# Patient Record
Sex: MALE | Race: BLACK OR AFRICAN AMERICAN | NOT HISPANIC OR LATINO | Employment: OTHER | URBAN - METROPOLITAN AREA
[De-identification: names, ages, dates, MRNs, and addresses within clinical notes are randomized per-mention and may not be internally consistent; named-entity substitution may affect disease eponyms.]

---

## 2017-01-01 ENCOUNTER — TELEPHONE (OUTPATIENT)
Dept: TRANSPLANT | Facility: CLINIC | Age: 67
End: 2017-01-01

## 2017-01-01 ENCOUNTER — TELEPHONE (OUTPATIENT)
Dept: ELECTROPHYSIOLOGY | Facility: CLINIC | Age: 67
End: 2017-01-01

## 2017-01-01 ENCOUNTER — ANTI-COAG VISIT (OUTPATIENT)
Dept: CARDIOLOGY | Facility: CLINIC | Age: 67
End: 2017-01-01

## 2017-01-01 ENCOUNTER — TELEPHONE (OUTPATIENT)
Dept: GASTROENTEROLOGY | Facility: HOSPITAL | Age: 67
End: 2017-01-01

## 2017-01-01 ENCOUNTER — HOSPITAL ENCOUNTER (INPATIENT)
Facility: HOSPITAL | Age: 67
LOS: 13 days | Discharge: HOME HEALTH | DRG: 394 | End: 2017-12-19
Attending: INTERNAL MEDICINE | Admitting: INTERNAL MEDICINE
Payer: MEDICARE

## 2017-01-01 ENCOUNTER — ANESTHESIA (OUTPATIENT)
Dept: ENDOSCOPY | Facility: HOSPITAL | Age: 67
DRG: 394 | End: 2017-01-01
Payer: MEDICARE

## 2017-01-01 ENCOUNTER — PATIENT OUTREACH (OUTPATIENT)
Dept: ADMINISTRATIVE | Facility: CLINIC | Age: 67
End: 2017-01-01

## 2017-01-01 ENCOUNTER — ANESTHESIA EVENT (OUTPATIENT)
Dept: ENDOSCOPY | Facility: HOSPITAL | Age: 67
DRG: 394 | End: 2017-01-01
Payer: MEDICARE

## 2017-01-01 ENCOUNTER — CLINICAL SUPPORT (OUTPATIENT)
Dept: TRANSPLANT | Facility: CLINIC | Age: 67
DRG: 394 | End: 2017-01-01
Payer: MEDICARE

## 2017-01-01 ENCOUNTER — HOSPITAL ENCOUNTER (INPATIENT)
Facility: HOSPITAL | Age: 67
LOS: 2 days | Discharge: HOME | DRG: 948 | End: 2017-12-29
Attending: INTERNAL MEDICINE | Admitting: INTERNAL MEDICINE
Payer: MEDICARE

## 2017-01-01 ENCOUNTER — OFFICE VISIT (OUTPATIENT)
Dept: TRANSPLANT | Facility: CLINIC | Age: 67
DRG: 394 | End: 2017-01-01
Payer: MEDICARE

## 2017-01-01 VITALS
BODY MASS INDEX: 20.9 KG/M2 | HEIGHT: 68 IN | OXYGEN SATURATION: 98 % | DIASTOLIC BLOOD PRESSURE: 61 MMHG | WEIGHT: 131.63 LBS | RESPIRATION RATE: 18 BRPM | HEART RATE: 84 BPM | WEIGHT: 141.13 LBS | SYSTOLIC BLOOD PRESSURE: 82 MMHG | SYSTOLIC BLOOD PRESSURE: 78 MMHG | HEART RATE: 80 BPM | RESPIRATION RATE: 18 BRPM | HEIGHT: 69 IN | BODY MASS INDEX: 19.95 KG/M2 | TEMPERATURE: 96 F | TEMPERATURE: 97 F | OXYGEN SATURATION: 96 %

## 2017-01-01 VITALS — SYSTOLIC BLOOD PRESSURE: 76 MMHG | WEIGHT: 142 LBS | TEMPERATURE: 98 F | HEIGHT: 68 IN | BODY MASS INDEX: 21.52 KG/M2

## 2017-01-01 VITALS — DIASTOLIC BLOOD PRESSURE: 68 MMHG | SYSTOLIC BLOOD PRESSURE: 78 MMHG

## 2017-01-01 DIAGNOSIS — I42.8 NONISCHEMIC CARDIOMYOPATHY: ICD-10-CM

## 2017-01-01 DIAGNOSIS — D64.9 ANEMIA: ICD-10-CM

## 2017-01-01 DIAGNOSIS — R79.1 SUBTHERAPEUTIC INTERNATIONAL NORMALIZED RATIO (INR): ICD-10-CM

## 2017-01-01 DIAGNOSIS — Z79.01 ANTICOAGULATION MONITORING, INR RANGE 2-3: ICD-10-CM

## 2017-01-01 DIAGNOSIS — Z95.811 LVAD (LEFT VENTRICULAR ASSIST DEVICE) PRESENT: ICD-10-CM

## 2017-01-01 DIAGNOSIS — Z95.811 HEART REPLACED BY HEART ASSIST DEVICE: ICD-10-CM

## 2017-01-01 DIAGNOSIS — I48.91 ATRIAL FIBRILLATION: ICD-10-CM

## 2017-01-01 DIAGNOSIS — D64.9 NORMOCYTIC ANEMIA: Primary | ICD-10-CM

## 2017-01-01 DIAGNOSIS — Z95.811 LVAD (LEFT VENTRICULAR ASSIST DEVICE) PRESENT: Primary | ICD-10-CM

## 2017-01-01 DIAGNOSIS — I10 ESSENTIAL HYPERTENSION: ICD-10-CM

## 2017-01-01 DIAGNOSIS — Z95.810 AICD (AUTOMATIC CARDIOVERTER/DEFIBRILLATOR) PRESENT: ICD-10-CM

## 2017-01-01 DIAGNOSIS — I49.9 ABNORMAL HEART RHYTHM: ICD-10-CM

## 2017-01-01 LAB
ABO + RH BLD: NORMAL
ABO + RH BLD: NORMAL
ALBUMIN SERPL BCP-MCNC: 2.6 G/DL
ALBUMIN SERPL BCP-MCNC: 2.7 G/DL
ALBUMIN SERPL BCP-MCNC: 2.8 G/DL
ALP SERPL-CCNC: 44 U/L
ALP SERPL-CCNC: 45 U/L
ALP SERPL-CCNC: 45 U/L
ALP SERPL-CCNC: 46 U/L
ALP SERPL-CCNC: 48 U/L
ALT SERPL W/O P-5'-P-CCNC: 10 U/L
ALT SERPL W/O P-5'-P-CCNC: 6 U/L
ALT SERPL W/O P-5'-P-CCNC: 7 U/L
ALT SERPL W/O P-5'-P-CCNC: 7 U/L
ALT SERPL W/O P-5'-P-CCNC: 9 U/L
ANION GAP SERPL CALC-SCNC: 12 MMOL/L
ANION GAP SERPL CALC-SCNC: 14 MMOL/L
ANION GAP SERPL CALC-SCNC: 5 MMOL/L
ANION GAP SERPL CALC-SCNC: 6 MMOL/L
ANION GAP SERPL CALC-SCNC: 7 MMOL/L
ANION GAP SERPL CALC-SCNC: 8 MMOL/L
ANION GAP SERPL CALC-SCNC: 9 MMOL/L
APTT BLDCRRT: 24.6 SEC
APTT BLDCRRT: 25.4 SEC
APTT BLDCRRT: 25.9 SEC
APTT BLDCRRT: 26.6 SEC
APTT BLDCRRT: 26.9 SEC
APTT BLDCRRT: 28.2 SEC
APTT BLDCRRT: 39 SEC
APTT BLDCRRT: 41.4 SEC
APTT BLDCRRT: 57.1 SEC
APTT BLDCRRT: 63.4 SEC
APTT BLDCRRT: 66.1 SEC
APTT BLDCRRT: 67.7 SEC
APTT BLDCRRT: 68.4 SEC
AST SERPL-CCNC: 11 U/L
AST SERPL-CCNC: 14 U/L
AST SERPL-CCNC: 15 U/L
AST SERPL-CCNC: 16 U/L
AST SERPL-CCNC: 18 U/L
BASOPHILS # BLD AUTO: 0 K/UL
BASOPHILS # BLD AUTO: 0.01 K/UL
BASOPHILS # BLD AUTO: 0.01 K/UL
BASOPHILS # BLD AUTO: 0.02 K/UL
BASOPHILS # BLD AUTO: 0.03 K/UL
BASOPHILS # BLD AUTO: 0.04 K/UL
BASOPHILS # BLD AUTO: 0.05 K/UL
BASOPHILS # BLD AUTO: 0.08 K/UL
BASOPHILS NFR BLD: 0 %
BASOPHILS NFR BLD: 0.1 %
BASOPHILS NFR BLD: 0.1 %
BASOPHILS NFR BLD: 0.2 %
BASOPHILS NFR BLD: 0.3 %
BASOPHILS NFR BLD: 0.3 %
BASOPHILS NFR BLD: 0.4 %
BASOPHILS NFR BLD: 0.4 %
BASOPHILS NFR BLD: 0.5 %
BASOPHILS NFR BLD: 0.5 %
BASOPHILS NFR BLD: 0.6 %
BASOPHILS NFR BLD: 0.6 %
BASOPHILS NFR BLD: 0.7 %
BASOPHILS NFR BLD: 0.9 %
BASOPHILS NFR BLD: 1 %
BASOPHILS NFR BLD: 1 %
BILIRUB DIRECT SERPL-MCNC: 0.3 MG/DL
BILIRUB DIRECT SERPL-MCNC: 0.4 MG/DL
BILIRUB SERPL-MCNC: 0.5 MG/DL
BILIRUB SERPL-MCNC: 0.7 MG/DL
BILIRUB SERPL-MCNC: 0.7 MG/DL
BILIRUB SERPL-MCNC: 0.8 MG/DL
BILIRUB SERPL-MCNC: 0.8 MG/DL
BLD GP AB SCN CELLS X3 SERPL QL: NORMAL
BLD GP AB SCN CELLS X3 SERPL QL: NORMAL
BLD PROD TYP BPU: NORMAL
BLOOD UNIT EXPIRATION DATE: NORMAL
BLOOD UNIT TYPE CODE: 5100
BLOOD UNIT TYPE: NORMAL
BNP (B-TYPE NATRIURETIC PEP): 589
BNP SERPL-MCNC: 337 PG/ML
BNP SERPL-MCNC: 386 PG/ML
BNP SERPL-MCNC: 386 PG/ML
BNP SERPL-MCNC: 518 PG/ML
BNP SERPL-MCNC: 552 PG/ML
BNP SERPL-MCNC: 840 PG/ML
BUN BLD-MCNC: 1 MG/DL (ref 4–21)
BUN SERPL-MCNC: 12 MG/DL
BUN SERPL-MCNC: 13 MG/DL
BUN SERPL-MCNC: 13 MG/DL
BUN SERPL-MCNC: 14 MG/DL
BUN SERPL-MCNC: 14 MG/DL
BUN SERPL-MCNC: 15 MG/DL
BUN SERPL-MCNC: 15 MG/DL
BUN SERPL-MCNC: 16 MG/DL
BUN SERPL-MCNC: 20 MG/DL
BUN SERPL-MCNC: 27 MG/DL
BUN SERPL-MCNC: 28 MG/DL
BUN SERPL-MCNC: 43 MG/DL
BUN SERPL-MCNC: 45 MG/DL
BUN SERPL-MCNC: 70 MG/DL
CALCIUM SERPL-MCNC: 8.4 MG/DL
CALCIUM SERPL-MCNC: 8.5 MG/DL
CALCIUM SERPL-MCNC: 8.7 MG/DL
CALCIUM SERPL-MCNC: 8.8 MG/DL
CALCIUM SERPL-MCNC: 8.9 MG/DL
CALCIUM SERPL-MCNC: 9 MG/DL
CHLORIDE SERPL-SCNC: 100 MMOL/L
CHLORIDE SERPL-SCNC: 100 MMOL/L
CHLORIDE SERPL-SCNC: 101 MMOL/L
CHLORIDE SERPL-SCNC: 102 MMOL/L
CHLORIDE SERPL-SCNC: 103 MMOL/L
CHLORIDE SERPL-SCNC: 105 MMOL/L
CHLORIDE SERPL-SCNC: 106 MMOL/L
CHLORIDE SERPL-SCNC: 107 MMOL/L
CHLORIDE SERPL-SCNC: 108 MMOL/L
CHLORIDE SERPL-SCNC: 109 MMOL/L
CO2 SERPL-SCNC: 20 MMOL/L
CO2 SERPL-SCNC: 23 MMOL/L
CO2 SERPL-SCNC: 24 MMOL/L
CO2 SERPL-SCNC: 25 MMOL/L
CO2 SERPL-SCNC: 26 MMOL/L
CO2 SERPL-SCNC: 26 MMOL/L
CO2 SERPL-SCNC: 27 MMOL/L
CO2 SERPL-SCNC: 28 MMOL/L
CO2 SERPL-SCNC: 29 MMOL/L
CO2 SERPL-SCNC: 30 MMOL/L
CODING SYSTEM: NORMAL
CREAT SERPL-MCNC: 0.8 MG/DL
CREAT SERPL-MCNC: 0.8 MG/DL
CREAT SERPL-MCNC: 0.9 MG/DL
CREAT SERPL-MCNC: 1 MG/DL
CREAT SERPL-MCNC: 1 MG/DL
CREAT SERPL-MCNC: 1.1 MG/DL
CREAT SERPL-MCNC: 1.2 MG/DL
CREAT SERPL-MCNC: 1.2 MG/DL
CREAT SERPL-MCNC: 1.3 MG/DL
CREAT SERPL-MCNC: 1.6 MG/DL
CRP SERPL-MCNC: 0.3 MG/L
CRP SERPL-MCNC: 0.6 MG/L
CRP SERPL-MCNC: 0.8 MG/L
CRP SERPL-MCNC: 1.1 MG/L
CRP SERPL-MCNC: 1.6 MG/L
CRP SERPL-MCNC: 4.9 MG/L
DIFFERENTIAL METHOD: ABNORMAL
DISPENSE STATUS: NORMAL
EOSINOPHIL # BLD AUTO: 0 K/UL
EOSINOPHIL # BLD AUTO: 0.1 K/UL
EOSINOPHIL # BLD AUTO: 0.2 K/UL
EOSINOPHIL # BLD AUTO: 0.3 K/UL
EOSINOPHIL # BLD AUTO: 0.4 K/UL
EOSINOPHIL # BLD AUTO: 0.5 K/UL
EOSINOPHIL NFR BLD: 0 %
EOSINOPHIL NFR BLD: 0.5 %
EOSINOPHIL NFR BLD: 3.1 %
EOSINOPHIL NFR BLD: 3.1 %
EOSINOPHIL NFR BLD: 3.3 %
EOSINOPHIL NFR BLD: 3.7 %
EOSINOPHIL NFR BLD: 4.5 %
EOSINOPHIL NFR BLD: 5 %
EOSINOPHIL NFR BLD: 5.1 %
EOSINOPHIL NFR BLD: 5.3 %
EOSINOPHIL NFR BLD: 5.4 %
EOSINOPHIL NFR BLD: 5.7 %
EOSINOPHIL NFR BLD: 5.9 %
EOSINOPHIL NFR BLD: 6 %
EOSINOPHIL NFR BLD: 6.4 %
EOSINOPHIL NFR BLD: 6.5 %
EOSINOPHIL NFR BLD: 6.7 %
ERYTHROCYTE [DISTWIDTH] IN BLOOD BY AUTOMATED COUNT: 16.7 %
ERYTHROCYTE [DISTWIDTH] IN BLOOD BY AUTOMATED COUNT: 16.8 %
ERYTHROCYTE [DISTWIDTH] IN BLOOD BY AUTOMATED COUNT: 17.1 %
ERYTHROCYTE [DISTWIDTH] IN BLOOD BY AUTOMATED COUNT: 17.2 %
ERYTHROCYTE [DISTWIDTH] IN BLOOD BY AUTOMATED COUNT: 17.3 %
ERYTHROCYTE [DISTWIDTH] IN BLOOD BY AUTOMATED COUNT: 17.3 %
ERYTHROCYTE [DISTWIDTH] IN BLOOD BY AUTOMATED COUNT: 17.6 %
ERYTHROCYTE [DISTWIDTH] IN BLOOD BY AUTOMATED COUNT: 18.5 %
ERYTHROCYTE [DISTWIDTH] IN BLOOD BY AUTOMATED COUNT: 18.5 %
ERYTHROCYTE [DISTWIDTH] IN BLOOD BY AUTOMATED COUNT: 18.6 %
ERYTHROCYTE [DISTWIDTH] IN BLOOD BY AUTOMATED COUNT: 18.6 %
ERYTHROCYTE [DISTWIDTH] IN BLOOD BY AUTOMATED COUNT: 19.1 %
ERYTHROCYTE [DISTWIDTH] IN BLOOD BY AUTOMATED COUNT: 19.1 %
ERYTHROCYTE [DISTWIDTH] IN BLOOD BY AUTOMATED COUNT: 19.3 %
ERYTHROCYTE [DISTWIDTH] IN BLOOD BY AUTOMATED COUNT: 19.7 %
ERYTHROCYTE [DISTWIDTH] IN BLOOD BY AUTOMATED COUNT: 20 %
ERYTHROCYTE [DISTWIDTH] IN BLOOD BY AUTOMATED COUNT: 20.3 %
ERYTHROCYTE [DISTWIDTH] IN BLOOD BY AUTOMATED COUNT: 20.5 %
ERYTHROCYTE [DISTWIDTH] IN BLOOD BY AUTOMATED COUNT: 20.5 %
ERYTHROCYTE [DISTWIDTH] IN BLOOD BY AUTOMATED COUNT: 20.7 %
ERYTHROCYTE [DISTWIDTH] IN BLOOD BY AUTOMATED COUNT: 21.2 %
EST. GFR  (AFRICAN AMERICAN): 50.8 ML/MIN/1.73 M^2
EST. GFR  (AFRICAN AMERICAN): >60 ML/MIN/1.73 M^2
EST. GFR  (NON AFRICAN AMERICAN): 43.9 ML/MIN/1.73 M^2
EST. GFR  (NON AFRICAN AMERICAN): 56.5 ML/MIN/1.73 M^2
EST. GFR  (NON AFRICAN AMERICAN): >60 ML/MIN/1.73 M^2
FACT X PPP CHRO-ACNC: 0.1 IU/ML
FACT X PPP CHRO-ACNC: 0.11 IU/ML
FACT X PPP CHRO-ACNC: 0.15 IU/ML
FACT X PPP CHRO-ACNC: 0.16 IU/ML
FACT X PPP CHRO-ACNC: 0.18 IU/ML
FACT X PPP CHRO-ACNC: 0.23 IU/ML
FACT X PPP CHRO-ACNC: 0.25 IU/ML
FACT X PPP CHRO-ACNC: 0.26 IU/ML
FACT X PPP CHRO-ACNC: 0.3 IU/ML
FACT X PPP CHRO-ACNC: 0.3 IU/ML
FACT X PPP CHRO-ACNC: 0.31 IU/ML
FACT X PPP CHRO-ACNC: 0.34 IU/ML
FACT X PPP CHRO-ACNC: 0.35 IU/ML
FACT X PPP CHRO-ACNC: 0.35 IU/ML
FACT X PPP CHRO-ACNC: 0.39 IU/ML
FACT X PPP CHRO-ACNC: 0.39 IU/ML
FACT X PPP CHRO-ACNC: 0.41 IU/ML
FACT X PPP CHRO-ACNC: 0.45 IU/ML
FACT X PPP CHRO-ACNC: 0.48 IU/ML
FACT X PPP CHRO-ACNC: 0.69 IU/ML
FACT X PPP CHRO-ACNC: <0.1 IU/ML
FERRITIN SERPL-MCNC: 254 NG/ML
GLUCOSE SERPL-MCNC: 107 MG/DL
GLUCOSE SERPL-MCNC: 177 MG/DL
GLUCOSE SERPL-MCNC: 68 MG/DL
GLUCOSE SERPL-MCNC: 72 MG/DL
GLUCOSE SERPL-MCNC: 72 MG/DL
GLUCOSE SERPL-MCNC: 73 MG/DL
GLUCOSE SERPL-MCNC: 73 MG/DL
GLUCOSE SERPL-MCNC: 74 MG/DL
GLUCOSE SERPL-MCNC: 76 MG/DL
GLUCOSE SERPL-MCNC: 77 MG/DL
GLUCOSE SERPL-MCNC: 80 MG/DL
GLUCOSE SERPL-MCNC: 80 MG/DL
GLUCOSE SERPL-MCNC: 82 MG/DL
GLUCOSE SERPL-MCNC: 83 MG/DL
GLUCOSE SERPL-MCNC: 86 MG/DL
GLUCOSE SERPL-MCNC: 98 MG/DL
GLUCOSE: 82
HCT VFR BLD AUTO: 16.9 %
HCT VFR BLD AUTO: 18 %
HCT VFR BLD AUTO: 20.2 %
HCT VFR BLD AUTO: 21 %
HCT VFR BLD AUTO: 21.6 %
HCT VFR BLD AUTO: 22 %
HCT VFR BLD AUTO: 22.2 %
HCT VFR BLD AUTO: 22.7 %
HCT VFR BLD AUTO: 22.9 %
HCT VFR BLD AUTO: 23.2 %
HCT VFR BLD AUTO: 23.3 %
HCT VFR BLD AUTO: 24 %
HCT VFR BLD AUTO: 24 %
HCT VFR BLD AUTO: 24.4 %
HCT VFR BLD AUTO: 25.1 %
HCT VFR BLD AUTO: 25.4 %
HCT VFR BLD AUTO: 25.9 %
HCT VFR BLD AUTO: 26 %
HCT VFR BLD AUTO: 27.3 %
HCT VFR BLD AUTO: 27.4 %
HCT VFR BLD AUTO: 28 % (ref 41–53)
HCT VFR BLD AUTO: 30.1 %
HGB BLD-MCNC: 5.4 G/DL
HGB BLD-MCNC: 5.6 G/DL
HGB BLD-MCNC: 6.7 G/DL
HGB BLD-MCNC: 6.7 G/DL
HGB BLD-MCNC: 6.9 G/DL
HGB BLD-MCNC: 7 G/DL
HGB BLD-MCNC: 7 G/DL
HGB BLD-MCNC: 7.1 G/DL
HGB BLD-MCNC: 7.2 G/DL
HGB BLD-MCNC: 7.5 G/DL
HGB BLD-MCNC: 7.6 G/DL
HGB BLD-MCNC: 7.6 G/DL
HGB BLD-MCNC: 7.7 G/DL
HGB BLD-MCNC: 7.9 G/DL
HGB BLD-MCNC: 7.9 G/DL
HGB BLD-MCNC: 8.2 G/DL
HGB BLD-MCNC: 8.5 G/DL
HGB BLD-MCNC: 8.5 G/DL
HGB BLD-MCNC: 8.8 G/DL (ref 13.5–17.5)
HGB BLD-MCNC: 9.4 G/DL
IMM GRANULOCYTES # BLD AUTO: 0.01 K/UL
IMM GRANULOCYTES # BLD AUTO: 0.02 K/UL
IMM GRANULOCYTES # BLD AUTO: 0.03 K/UL
IMM GRANULOCYTES # BLD AUTO: 0.04 K/UL
IMM GRANULOCYTES # BLD AUTO: 0.05 K/UL
IMM GRANULOCYTES # BLD AUTO: 0.07 K/UL
IMM GRANULOCYTES # BLD AUTO: 0.08 K/UL
IMM GRANULOCYTES # BLD AUTO: 0.12 K/UL
IMM GRANULOCYTES # BLD AUTO: 0.13 K/UL
IMM GRANULOCYTES # BLD AUTO: 0.14 K/UL
IMM GRANULOCYTES NFR BLD AUTO: 0.2 %
IMM GRANULOCYTES NFR BLD AUTO: 0.3 %
IMM GRANULOCYTES NFR BLD AUTO: 0.4 %
IMM GRANULOCYTES NFR BLD AUTO: 0.5 %
IMM GRANULOCYTES NFR BLD AUTO: 0.5 %
IMM GRANULOCYTES NFR BLD AUTO: 0.6 %
IMM GRANULOCYTES NFR BLD AUTO: 0.7 %
IMM GRANULOCYTES NFR BLD AUTO: 0.8 %
IMM GRANULOCYTES NFR BLD AUTO: 0.8 %
IMM GRANULOCYTES NFR BLD AUTO: 0.9 %
IMM GRANULOCYTES NFR BLD AUTO: 1 %
INR PPP: 1.1
INR PPP: 1.1
INR PPP: 1.2
INR PPP: 1.3
INR PPP: 1.3
INR PPP: 1.4
INR PPP: 1.4
INR PPP: 1.5
INR PPP: 1.6
INR PPP: 1.7
INR PPP: 1.8
INR PPP: 1.9
INR PPP: 2
INR PPP: 2
INR PPP: 2.1
INR PPP: 2.2
INR PPP: 2.2
INR PPP: 2.8
INR PPP: 3
IRON SERPL-MCNC: 28 UG/DL
LDH SERPL L TO P-CCNC: 147 U/L
LDH SERPL L TO P-CCNC: 163 U/L
LDH SERPL L TO P-CCNC: 166 U/L
LDH SERPL L TO P-CCNC: 168 U/L
LDH SERPL L TO P-CCNC: 169 U/L
LDH SERPL L TO P-CCNC: 171 U/L
LDH SERPL L TO P-CCNC: 173 U/L
LDH SERPL L TO P-CCNC: 173 U/L
LDH SERPL L TO P-CCNC: 175 U/L
LDH SERPL L TO P-CCNC: 177 U/L
LDH SERPL L TO P-CCNC: 179 U/L
LDH SERPL L TO P-CCNC: 188 U/L
LDH SERPL L TO P-CCNC: 196 U/L
LDH SERPL L TO P-CCNC: 200 U/L
LDH SERPL L TO P-CCNC: 201 U/L
LDH SERPL L TO P-CCNC: 209 U/L
LYMPHOCYTES # BLD AUTO: 1 K/UL
LYMPHOCYTES # BLD AUTO: 1.2 K/UL
LYMPHOCYTES # BLD AUTO: 1.3 K/UL
LYMPHOCYTES # BLD AUTO: 1.5 K/UL
LYMPHOCYTES # BLD AUTO: 1.6 K/UL
LYMPHOCYTES # BLD AUTO: 1.6 K/UL
LYMPHOCYTES # BLD AUTO: 1.7 K/UL
LYMPHOCYTES # BLD AUTO: 1.8 K/UL
LYMPHOCYTES # BLD AUTO: 1.9 K/UL
LYMPHOCYTES # BLD AUTO: 2.3 K/UL
LYMPHOCYTES # BLD AUTO: 2.5 K/UL
LYMPHOCYTES NFR BLD: 12.7 %
LYMPHOCYTES NFR BLD: 14.9 %
LYMPHOCYTES NFR BLD: 15.2 %
LYMPHOCYTES NFR BLD: 15.4 %
LYMPHOCYTES NFR BLD: 15.5 %
LYMPHOCYTES NFR BLD: 15.9 %
LYMPHOCYTES NFR BLD: 16.3 %
LYMPHOCYTES NFR BLD: 16.8 %
LYMPHOCYTES NFR BLD: 19.4 %
LYMPHOCYTES NFR BLD: 20 %
LYMPHOCYTES NFR BLD: 20.3 %
LYMPHOCYTES NFR BLD: 21.2 %
LYMPHOCYTES NFR BLD: 21.5 %
LYMPHOCYTES NFR BLD: 24.6 %
LYMPHOCYTES NFR BLD: 25.2 %
LYMPHOCYTES NFR BLD: 25.3 %
LYMPHOCYTES NFR BLD: 25.4 %
LYMPHOCYTES NFR BLD: 26.4 %
LYMPHOCYTES NFR BLD: 28.5 %
LYMPHOCYTES NFR BLD: 34.2 %
MAGNESIUM SERPL-MCNC: 1.7 MG/DL
MAGNESIUM SERPL-MCNC: 1.9 MG/DL
MAGNESIUM SERPL-MCNC: 1.9 MG/DL
MAGNESIUM SERPL-MCNC: 2.2 MG/DL
MAGNESIUM SERPL-MCNC: 2.3 MG/DL
MAGNESIUM SERPL-MCNC: 2.4 MG/DL
MAGNESIUM SERPL-MCNC: 2.4 MG/DL
MAGNESIUM SERPL-MCNC: 2.5 MG/DL
MAGNESIUM SERPL-MCNC: 2.6 MG/DL
MCH RBC QN AUTO: 26.2 PG
MCH RBC QN AUTO: 26.3 PG
MCH RBC QN AUTO: 26.4 PG
MCH RBC QN AUTO: 26.5 PG
MCH RBC QN AUTO: 26.6 PG
MCH RBC QN AUTO: 26.7 PG
MCH RBC QN AUTO: 26.8 PG
MCH RBC QN AUTO: 27 PG
MCH RBC QN AUTO: 27.2 PG
MCH RBC QN AUTO: 27.2 PG
MCH RBC QN AUTO: 27.3 PG
MCH RBC QN AUTO: 27.3 PG
MCH RBC QN AUTO: 27.4 PG
MCH RBC QN AUTO: 27.7 PG
MCH RBC QN AUTO: 27.9 PG
MCH RBC QN AUTO: 27.9 PG
MCH RBC QN AUTO: 28 PG
MCH RBC QN AUTO: 28.1 PG
MCH RBC QN AUTO: 28.5 PG
MCH RBC QN AUTO: 28.6 PG
MCH RBC QN AUTO: 28.7 PG
MCHC RBC AUTO-ENTMCNC: 30.4 G/DL
MCHC RBC AUTO-ENTMCNC: 30.7 G/DL
MCHC RBC AUTO-ENTMCNC: 30.8 G/DL
MCHC RBC AUTO-ENTMCNC: 31 G/DL
MCHC RBC AUTO-ENTMCNC: 31.1 G/DL
MCHC RBC AUTO-ENTMCNC: 31.2 G/DL
MCHC RBC AUTO-ENTMCNC: 31.3 G/DL
MCHC RBC AUTO-ENTMCNC: 31.4 G/DL
MCHC RBC AUTO-ENTMCNC: 31.7 G/DL
MCHC RBC AUTO-ENTMCNC: 31.7 G/DL
MCHC RBC AUTO-ENTMCNC: 31.8 G/DL
MCHC RBC AUTO-ENTMCNC: 31.9 G/DL
MCHC RBC AUTO-ENTMCNC: 32 G/DL
MCHC RBC AUTO-ENTMCNC: 32.2 G/DL
MCHC RBC AUTO-ENTMCNC: 32.3 G/DL
MCHC RBC AUTO-ENTMCNC: 32.9 G/DL
MCHC RBC AUTO-ENTMCNC: 33.2 G/DL
MCV RBC AUTO: 82 FL
MCV RBC AUTO: 83 FL
MCV RBC AUTO: 84 FL
MCV RBC AUTO: 85 FL
MCV RBC AUTO: 86 FL
MCV RBC AUTO: 86 FL
MCV RBC AUTO: 87 FL
MCV RBC AUTO: 87 FL
MCV RBC AUTO: 88 FL
MCV RBC AUTO: 89 FL
MCV RBC AUTO: 90 FL
MCV RBC AUTO: 90 FL
MCV RBC AUTO: 91 FL
MONOCYTES # BLD AUTO: 0.1 K/UL
MONOCYTES # BLD AUTO: 0.1 K/UL
MONOCYTES # BLD AUTO: 0.5 K/UL
MONOCYTES # BLD AUTO: 0.5 K/UL
MONOCYTES # BLD AUTO: 0.6 K/UL
MONOCYTES # BLD AUTO: 0.7 K/UL
MONOCYTES # BLD AUTO: 0.8 K/UL
MONOCYTES # BLD AUTO: 0.9 K/UL
MONOCYTES # BLD AUTO: 0.9 K/UL
MONOCYTES # BLD AUTO: 1.1 K/UL
MONOCYTES # BLD AUTO: 1.1 K/UL
MONOCYTES # BLD AUTO: 1.8 K/UL
MONOCYTES NFR BLD: 0.9 %
MONOCYTES NFR BLD: 1.3 %
MONOCYTES NFR BLD: 10.3 %
MONOCYTES NFR BLD: 11 %
MONOCYTES NFR BLD: 11.4 %
MONOCYTES NFR BLD: 11.8 %
MONOCYTES NFR BLD: 12.5 %
MONOCYTES NFR BLD: 12.7 %
MONOCYTES NFR BLD: 12.8 %
MONOCYTES NFR BLD: 7.7 %
MONOCYTES NFR BLD: 7.8 %
MONOCYTES NFR BLD: 7.9 %
MONOCYTES NFR BLD: 8.6 %
MONOCYTES NFR BLD: 8.8 %
MONOCYTES NFR BLD: 9 %
MONOCYTES NFR BLD: 9.2 %
MONOCYTES NFR BLD: 9.3 %
MONOCYTES NFR BLD: 9.4 %
MONOCYTES NFR BLD: 9.8 %
MONOCYTES NFR BLD: 9.9 %
NEUTROPHILS # BLD AUTO: 10.5 K/UL
NEUTROPHILS # BLD AUTO: 11.7 K/UL
NEUTROPHILS # BLD AUTO: 2.4 K/UL
NEUTROPHILS # BLD AUTO: 2.8 K/UL
NEUTROPHILS # BLD AUTO: 3 K/UL
NEUTROPHILS # BLD AUTO: 3.2 K/UL
NEUTROPHILS # BLD AUTO: 3.4 K/UL
NEUTROPHILS # BLD AUTO: 3.6 K/UL
NEUTROPHILS # BLD AUTO: 3.6 K/UL
NEUTROPHILS # BLD AUTO: 3.8 K/UL
NEUTROPHILS # BLD AUTO: 4 K/UL
NEUTROPHILS # BLD AUTO: 4.2 K/UL
NEUTROPHILS # BLD AUTO: 4.3 K/UL
NEUTROPHILS # BLD AUTO: 4.5 K/UL
NEUTROPHILS # BLD AUTO: 5.6 K/UL
NEUTROPHILS # BLD AUTO: 6.7 K/UL
NEUTROPHILS # BLD AUTO: 6.8 K/UL
NEUTROPHILS # BLD AUTO: 7.8 K/UL
NEUTROPHILS # BLD AUTO: 8.3 K/UL
NEUTROPHILS # BLD AUTO: 9.5 K/UL
NEUTROPHILS NFR BLD: 46.4 %
NEUTROPHILS NFR BLD: 51.9 %
NEUTROPHILS NFR BLD: 55 %
NEUTROPHILS NFR BLD: 56.7 %
NEUTROPHILS NFR BLD: 57.5 %
NEUTROPHILS NFR BLD: 58.2 %
NEUTROPHILS NFR BLD: 58.6 %
NEUTROPHILS NFR BLD: 61.7 %
NEUTROPHILS NFR BLD: 62 %
NEUTROPHILS NFR BLD: 65.7 %
NEUTROPHILS NFR BLD: 65.9 %
NEUTROPHILS NFR BLD: 66.2 %
NEUTROPHILS NFR BLD: 69.4 %
NEUTROPHILS NFR BLD: 70.3 %
NEUTROPHILS NFR BLD: 72.5 %
NEUTROPHILS NFR BLD: 72.6 %
NEUTROPHILS NFR BLD: 74.6 %
NEUTROPHILS NFR BLD: 74.7 %
NEUTROPHILS NFR BLD: 83 %
NEUTROPHILS NFR BLD: 85.2 %
NRBC BLD-RTO: 0 /100 WBC
NRBC BLD-RTO: 1 /100 WBC
NUM UNITS TRANS PACKED RBC: NORMAL
PHOSPHATE SERPL-MCNC: 2.4 MG/DL
PHOSPHATE SERPL-MCNC: 2.6 MG/DL
PHOSPHATE SERPL-MCNC: 2.7 MG/DL
PHOSPHATE SERPL-MCNC: 3 MG/DL
PHOSPHATE SERPL-MCNC: 3 MG/DL
PHOSPHATE SERPL-MCNC: 3.1 MG/DL
PHOSPHATE SERPL-MCNC: 3.1 MG/DL
PHOSPHATE SERPL-MCNC: 3.2 MG/DL
PHOSPHATE SERPL-MCNC: 3.3 MG/DL
PHOSPHATE SERPL-MCNC: 3.3 MG/DL
PHOSPHATE SERPL-MCNC: 3.6 MG/DL
PHOSPHATE SERPL-MCNC: 3.9 MG/DL
PHOSPHATE SERPL-MCNC: 4.2 MG/DL
PLATELET # BLD AUTO: 156 K/UL
PLATELET # BLD AUTO: 159 K/UL
PLATELET # BLD AUTO: 164 K/UL
PLATELET # BLD AUTO: 175 K/UL
PLATELET # BLD AUTO: 187 K/UL
PLATELET # BLD AUTO: 194 K/UL
PLATELET # BLD AUTO: 200 K/UL
PLATELET # BLD AUTO: 202 K/UL
PLATELET # BLD AUTO: 202 K/UL
PLATELET # BLD AUTO: 206 K/UL
PLATELET # BLD AUTO: 208 K/UL
PLATELET # BLD AUTO: 215 K/UL
PLATELET # BLD AUTO: 217 K/UL
PLATELET # BLD AUTO: 218 K/UL
PLATELET # BLD AUTO: 226 K/UL
PLATELET # BLD AUTO: 231 K/UL
PLATELET # BLD AUTO: 231 K/UL
PLATELET # BLD AUTO: 234 K/UL
PLATELET # BLD AUTO: 240 K/UL
PLATELET # BLD AUTO: 245 K/UL
PLATELET # BLD AUTO: 263 K/UL
PLATELETS: 216
PMV BLD AUTO: 10.1 FL
PMV BLD AUTO: 10.3 FL
PMV BLD AUTO: 10.5 FL
PMV BLD AUTO: 10.5 FL
PMV BLD AUTO: 10.6 FL
PMV BLD AUTO: 10.7 FL
PMV BLD AUTO: 10.7 FL
PMV BLD AUTO: 10.8 FL
PMV BLD AUTO: 10.8 FL
PMV BLD AUTO: 10.9 FL
PMV BLD AUTO: 10.9 FL
PMV BLD AUTO: 11 FL
PMV BLD AUTO: 11.3 FL
PMV BLD AUTO: 11.4 FL
PMV BLD AUTO: 11.4 FL
PMV BLD AUTO: 11.5 FL
PMV BLD AUTO: 12 FL
PMV BLD AUTO: 12 FL
PMV BLD AUTO: 12.1 FL
POCT GLUCOSE: 128 MG/DL (ref 70–110)
POTASSIUM SERPL-SCNC: 3.3 MMOL/L
POTASSIUM SERPL-SCNC: 3.4 MMOL/L
POTASSIUM SERPL-SCNC: 3.5 MMOL/L
POTASSIUM SERPL-SCNC: 3.5 MMOL/L
POTASSIUM SERPL-SCNC: 3.6 MMOL/L
POTASSIUM SERPL-SCNC: 3.6 MMOL/L
POTASSIUM SERPL-SCNC: 3.8 MMOL/L
POTASSIUM SERPL-SCNC: 3.9 MMOL/L
POTASSIUM SERPL-SCNC: 4 MMOL/L
POTASSIUM SERPL-SCNC: 4 MMOL/L
POTASSIUM SERPL-SCNC: 4.2 MMOL/L
POTASSIUM: 3.7
PREALB SERPL-MCNC: 18 MG/DL
PREALB SERPL-MCNC: 21 MG/DL
PREALB SERPL-MCNC: 21 MG/DL
PREALB SERPL-MCNC: 22 MG/DL
PREALB SERPL-MCNC: 24 MG/DL
PREALB SERPL-MCNC: 26 MG/DL
PROT SERPL-MCNC: 5.5 G/DL
PROT SERPL-MCNC: 5.5 G/DL
PROT SERPL-MCNC: 5.7 G/DL
PROT SERPL-MCNC: 5.9 G/DL
PROT SERPL-MCNC: 6 G/DL
PROTHROMBIN TIME: 11.7 SEC
PROTHROMBIN TIME: 11.9 SEC
PROTHROMBIN TIME: 12.2 SEC
PROTHROMBIN TIME: 13.1 SEC
PROTHROMBIN TIME: 13.4 SEC
PROTHROMBIN TIME: 14.6 SEC
PROTHROMBIN TIME: 15.7 SEC
PROTHROMBIN TIME: 15.8 SEC
PROTHROMBIN TIME: 16.8 SEC
PROTHROMBIN TIME: 17.7 SEC
PROTHROMBIN TIME: 19.1 SEC
PROTHROMBIN TIME: 20 SEC
PROTHROMBIN TIME: 20.9 SEC
PROTHROMBIN TIME: 21.6 SEC
PROTHROMBIN TIME: 21.8 SEC
PROTHROMBIN TIME: 28 SEC
RBC # BLD AUTO: 2.02 M/UL
RBC # BLD AUTO: 2.13 M/UL
RBC # BLD AUTO: 2.4 M/UL
RBC # BLD AUTO: 2.42 M/UL
RBC # BLD AUTO: 2.49 M/UL
RBC # BLD AUTO: 2.57 M/UL
RBC # BLD AUTO: 2.63 M/UL
RBC # BLD AUTO: 2.64 M/UL
RBC # BLD AUTO: 2.65 M/UL
RBC # BLD AUTO: 2.65 M/UL
RBC # BLD AUTO: 2.74 M/UL
RBC # BLD AUTO: 2.76 M/UL
RBC # BLD AUTO: 2.82 M/UL
RBC # BLD AUTO: 2.83 M/UL
RBC # BLD AUTO: 2.84 M/UL
RBC # BLD AUTO: 2.87 M/UL
RBC # BLD AUTO: 2.89 M/UL
RBC # BLD AUTO: 2.93 M/UL
RBC # BLD AUTO: 3.03 M/UL
RBC # BLD AUTO: 3.11 M/UL
RBC # BLD AUTO: 3.14 10*6/UL
RBC # BLD AUTO: 3.37 M/UL
SATURATED IRON: 8 %
SODIUM SERPL-SCNC: 135 MMOL/L
SODIUM SERPL-SCNC: 136 MMOL/L
SODIUM SERPL-SCNC: 136 MMOL/L
SODIUM SERPL-SCNC: 137 MMOL/L
SODIUM SERPL-SCNC: 138 MMOL/L
SODIUM SERPL-SCNC: 140 MMOL/L
SODIUM SERPL-SCNC: 140 MMOL/L
SODIUM SERPL-SCNC: 141 MMOL/L
SODIUM SERPL-SCNC: 141 MMOL/L
SODIUM SERPL-SCNC: 142 MMOL/L
SODIUM SERPL-SCNC: 143 MMOL/L
SODIUM SERPL-SCNC: 144 MMOL/L
SODIUM: 136
TOTAL IRON BINDING CAPACITY: 334 UG/DL
TRANS ERYTHROCYTES VOL PATIENT: NORMAL ML
TRANSFERRIN SERPL-MCNC: 226 MG/DL
WBC # BLD AUTO: 11.44 K/UL
WBC # BLD AUTO: 12.73 K/UL
WBC # BLD AUTO: 14 K/UL
WBC # BLD AUTO: 16.18 K/UL
WBC # BLD AUTO: 16.66 K/UL
WBC # BLD AUTO: 4.98 K/UL
WBC # BLD AUTO: 5.17 K/UL
WBC # BLD AUTO: 5.72 K/UL
WBC # BLD AUTO: 5.76 K/UL
WBC # BLD AUTO: 5.79 K/UL
WBC # BLD AUTO: 5.83 K/UL
WBC # BLD AUTO: 6.1 K/UL
WBC # BLD AUTO: 6.18 K/UL
WBC # BLD AUTO: 6.37 K/UL
WBC # BLD AUTO: 6.46 K/UL
WBC # BLD AUTO: 6.49 K/UL
WBC # BLD AUTO: 6.9 K/UL
WBC # BLD AUTO: 8.07 K/UL
WBC # BLD AUTO: 8.49 K/UL
WBC # BLD AUTO: 9.19 K/UL
WBC # BLD AUTO: 9.64 K/UL
WBC: 5.7

## 2017-01-01 PROCEDURE — 83735 ASSAY OF MAGNESIUM: CPT

## 2017-01-01 PROCEDURE — 25000003 PHARM REV CODE 250: Performed by: PHYSICIAN ASSISTANT

## 2017-01-01 PROCEDURE — 37000009 HC ANESTHESIA EA ADD 15 MINS: Performed by: INTERNAL MEDICINE

## 2017-01-01 PROCEDURE — 84134 ASSAY OF PREALBUMIN: CPT

## 2017-01-01 PROCEDURE — 83880 ASSAY OF NATRIURETIC PEPTIDE: CPT

## 2017-01-01 PROCEDURE — 85610 PROTHROMBIN TIME: CPT

## 2017-01-01 PROCEDURE — 85025 COMPLETE CBC W/AUTO DIFF WBC: CPT

## 2017-01-01 PROCEDURE — 88341 IMHCHEM/IMCYTCHM EA ADD ANTB: CPT | Mod: 26,,, | Performed by: PATHOLOGY

## 2017-01-01 PROCEDURE — 99232 SBSQ HOSP IP/OBS MODERATE 35: CPT | Mod: ,,, | Performed by: INTERNAL MEDICINE

## 2017-01-01 PROCEDURE — 20600001 HC STEP DOWN PRIVATE ROOM

## 2017-01-01 PROCEDURE — 83615 LACTATE (LD) (LDH) ENZYME: CPT

## 2017-01-01 PROCEDURE — 63600175 PHARM REV CODE 636 W HCPCS: Performed by: INTERNAL MEDICINE

## 2017-01-01 PROCEDURE — 43235 EGD DIAGNOSTIC BRUSH WASH: CPT | Mod: ,,, | Performed by: INTERNAL MEDICINE

## 2017-01-01 PROCEDURE — 85520 HEPARIN ASSAY: CPT | Mod: 91

## 2017-01-01 PROCEDURE — 36415 COLL VENOUS BLD VENIPUNCTURE: CPT

## 2017-01-01 PROCEDURE — 80048 BASIC METABOLIC PNL TOTAL CA: CPT

## 2017-01-01 PROCEDURE — 0DJ07ZZ INSPECTION OF UPPER INTESTINAL TRACT, VIA NATURAL OR ARTIFICIAL OPENING: ICD-10-PCS | Performed by: INTERNAL MEDICINE

## 2017-01-01 PROCEDURE — P9016 RBC LEUKOCYTES REDUCED: HCPCS

## 2017-01-01 PROCEDURE — 25500020 PHARM REV CODE 255: Performed by: INTERNAL MEDICINE

## 2017-01-01 PROCEDURE — 63600175 PHARM REV CODE 636 W HCPCS: Performed by: PHYSICIAN ASSISTANT

## 2017-01-01 PROCEDURE — 27000248 HC VAD-ADDITIONAL DAY

## 2017-01-01 PROCEDURE — 85730 THROMBOPLASTIN TIME PARTIAL: CPT

## 2017-01-01 PROCEDURE — 93750 INTERROGATION VAD IN PERSON: CPT | Mod: ,,, | Performed by: INTERNAL MEDICINE

## 2017-01-01 PROCEDURE — 85520 HEPARIN ASSAY: CPT

## 2017-01-01 PROCEDURE — 25000003 PHARM REV CODE 250: Performed by: INTERNAL MEDICINE

## 2017-01-01 PROCEDURE — 86920 COMPATIBILITY TEST SPIN: CPT

## 2017-01-01 PROCEDURE — 36620 INSERTION CATHETER ARTERY: CPT | Mod: 59,,, | Performed by: ANESTHESIOLOGY

## 2017-01-01 PROCEDURE — 86140 C-REACTIVE PROTEIN: CPT

## 2017-01-01 PROCEDURE — 97161 PT EVAL LOW COMPLEX 20 MIN: CPT

## 2017-01-01 PROCEDURE — 84100 ASSAY OF PHOSPHORUS: CPT

## 2017-01-01 PROCEDURE — G8989 SELF CARE D/C STATUS: HCPCS | Mod: CK

## 2017-01-01 PROCEDURE — 99233 SBSQ HOSP IP/OBS HIGH 50: CPT | Mod: ,,, | Performed by: INTERNAL MEDICINE

## 2017-01-01 PROCEDURE — C9113 INJ PANTOPRAZOLE SODIUM, VIA: HCPCS | Performed by: INTERNAL MEDICINE

## 2017-01-01 PROCEDURE — 27201037 HC PRESSURE MONITORING SET UP

## 2017-01-01 PROCEDURE — 43235 EGD DIAGNOSTIC BRUSH WASH: CPT | Performed by: INTERNAL MEDICINE

## 2017-01-01 PROCEDURE — 36569 INSJ PICC 5 YR+ W/O IMAGING: CPT

## 2017-01-01 PROCEDURE — 99213 OFFICE O/P EST LOW 20 MIN: CPT | Mod: PBBFAC,25 | Performed by: INTERNAL MEDICINE

## 2017-01-01 PROCEDURE — 36430 TRANSFUSION BLD/BLD COMPNT: CPT

## 2017-01-01 PROCEDURE — 37000008 HC ANESTHESIA 1ST 15 MINUTES: Performed by: INTERNAL MEDICINE

## 2017-01-01 PROCEDURE — 93750 INTERROGATION VAD IN PERSON: CPT | Performed by: PHYSICIAN ASSISTANT

## 2017-01-01 PROCEDURE — 94761 N-INVAS EAR/PLS OXIMETRY MLT: CPT

## 2017-01-01 PROCEDURE — 97530 THERAPEUTIC ACTIVITIES: CPT

## 2017-01-01 PROCEDURE — 93750 INTERROGATION VAD IN PERSON: CPT | Performed by: INTERNAL MEDICINE

## 2017-01-01 PROCEDURE — C9113 INJ PANTOPRAZOLE SODIUM, VIA: HCPCS | Performed by: PHYSICIAN ASSISTANT

## 2017-01-01 PROCEDURE — 44377 SMALL BOWEL ENDOSCOPY/BIOPSY: CPT | Performed by: INTERNAL MEDICINE

## 2017-01-01 PROCEDURE — 97802 MEDICAL NUTRITION INDIV IN: CPT | Performed by: DIETITIAN, REGISTERED

## 2017-01-01 PROCEDURE — 80076 HEPATIC FUNCTION PANEL: CPT

## 2017-01-01 PROCEDURE — D9220A PRA ANESTHESIA: Mod: CRNA,,, | Performed by: NURSE ANESTHETIST, CERTIFIED REGISTERED

## 2017-01-01 PROCEDURE — 99223 1ST HOSP IP/OBS HIGH 75: CPT | Mod: GC,,, | Performed by: INTERNAL MEDICINE

## 2017-01-01 PROCEDURE — P9021 RED BLOOD CELLS UNIT: HCPCS

## 2017-01-01 PROCEDURE — 99999 PR PBB SHADOW E&M-EST. PATIENT-LVL I: CPT | Mod: PBBFAC,,,

## 2017-01-01 PROCEDURE — 99233 SBSQ HOSP IP/OBS HIGH 50: CPT | Mod: 25,,, | Performed by: INTERNAL MEDICINE

## 2017-01-01 PROCEDURE — 91110 GI TRC IMG INTRAL ESOPH-ILE: CPT | Mod: 26,GC,, | Performed by: INTERNAL MEDICINE

## 2017-01-01 PROCEDURE — 86900 BLOOD TYPING SEROLOGIC ABO: CPT

## 2017-01-01 PROCEDURE — 99999 PR PBB SHADOW E&M-EST. PATIENT-LVL III: CPT | Mod: PBBFAC,,, | Performed by: INTERNAL MEDICINE

## 2017-01-01 PROCEDURE — 76937 US GUIDE VASCULAR ACCESS: CPT

## 2017-01-01 PROCEDURE — 83540 ASSAY OF IRON: CPT

## 2017-01-01 PROCEDURE — 86901 BLOOD TYPING SEROLOGIC RH(D): CPT

## 2017-01-01 PROCEDURE — 88305 TISSUE EXAM BY PATHOLOGIST: CPT | Performed by: PATHOLOGY

## 2017-01-01 PROCEDURE — 82728 ASSAY OF FERRITIN: CPT

## 2017-01-01 PROCEDURE — 0DCB8ZZ EXTIRPATION OF MATTER FROM ILEUM, VIA NATURAL OR ARTIFICIAL OPENING ENDOSCOPIC: ICD-10-PCS | Performed by: INTERNAL MEDICINE

## 2017-01-01 PROCEDURE — 86644 CMV ANTIBODY: CPT

## 2017-01-01 PROCEDURE — G8988 SELF CARE GOAL STATUS: HCPCS | Mod: CK

## 2017-01-01 PROCEDURE — 25000003 PHARM REV CODE 250: Performed by: STUDENT IN AN ORGANIZED HEALTH CARE EDUCATION/TRAINING PROGRAM

## 2017-01-01 PROCEDURE — D9220A PRA ANESTHESIA: Mod: ANES,,, | Performed by: ANESTHESIOLOGY

## 2017-01-01 PROCEDURE — 93010 ELECTROCARDIOGRAM REPORT: CPT | Mod: ,,, | Performed by: INTERNAL MEDICINE

## 2017-01-01 PROCEDURE — G8987 SELF CARE CURRENT STATUS: HCPCS | Mod: CK

## 2017-01-01 PROCEDURE — P9022 WASHED RED BLOOD CELLS UNIT: HCPCS

## 2017-01-01 PROCEDURE — 85610 PROTHROMBIN TIME: CPT | Mod: 91

## 2017-01-01 PROCEDURE — 44377 SMALL BOWEL ENDOSCOPY/BIOPSY: CPT | Mod: ,,, | Performed by: INTERNAL MEDICINE

## 2017-01-01 PROCEDURE — 86850 RBC ANTIBODY SCREEN: CPT

## 2017-01-01 PROCEDURE — 88305 TISSUE EXAM BY PATHOLOGIST: CPT | Mod: 26,,, | Performed by: PATHOLOGY

## 2017-01-01 PROCEDURE — 63600175 PHARM REV CODE 636 W HCPCS: Performed by: NURSE ANESTHETIST, CERTIFIED REGISTERED

## 2017-01-01 PROCEDURE — 25000003 PHARM REV CODE 250: Performed by: NURSE ANESTHETIST, CERTIFIED REGISTERED

## 2017-01-01 PROCEDURE — 99238 HOSP IP/OBS DSCHRG MGMT 30/<: CPT | Mod: ,,, | Performed by: INTERNAL MEDICINE

## 2017-01-01 PROCEDURE — 99233 SBSQ HOSP IP/OBS HIGH 50: CPT | Mod: 24,,, | Performed by: THORACIC SURGERY (CARDIOTHORACIC VASCULAR SURGERY)

## 2017-01-01 PROCEDURE — 85025 COMPLETE CBC W/AUTO DIFF WBC: CPT | Mod: 91

## 2017-01-01 PROCEDURE — 93750 INTERROGATION VAD IN PERSON: CPT | Mod: 77,ICN,, | Performed by: THORACIC SURGERY (CARDIOTHORACIC VASCULAR SURGERY)

## 2017-01-01 PROCEDURE — 44363 SMALL BOWEL ENDOSCOPY: CPT | Mod: 59,,, | Performed by: INTERNAL MEDICINE

## 2017-01-01 PROCEDURE — 88342 IMHCHEM/IMCYTCHM 1ST ANTB: CPT | Mod: 26,,, | Performed by: PATHOLOGY

## 2017-01-01 PROCEDURE — 44799 UNLISTED PX SMALL INTESTINE: CPT | Mod: ,,, | Performed by: INTERNAL MEDICINE

## 2017-01-01 PROCEDURE — 44363 SMALL BOWEL ENDOSCOPY: CPT | Performed by: INTERNAL MEDICINE

## 2017-01-01 PROCEDURE — C1751 CATH, INF, PER/CENT/MIDLINE: HCPCS

## 2017-01-01 PROCEDURE — 0DBB8ZX EXCISION OF ILEUM, VIA NATURAL OR ARTIFICIAL OPENING ENDOSCOPIC, DIAGNOSTIC: ICD-10-PCS | Performed by: INTERNAL MEDICINE

## 2017-01-01 PROCEDURE — 99211 OFF/OP EST MAY X REQ PHY/QHP: CPT | Mod: PBBFAC,27,25

## 2017-01-01 PROCEDURE — 27201040 HC RC 50 FILTER

## 2017-01-01 PROCEDURE — 85027 COMPLETE CBC AUTOMATED: CPT

## 2017-01-01 PROCEDURE — 99214 OFFICE O/P EST MOD 30 MIN: CPT | Mod: S$PBB,,, | Performed by: INTERNAL MEDICINE

## 2017-01-01 PROCEDURE — 44799 UNLISTED PX SMALL INTESTINE: CPT | Performed by: INTERNAL MEDICINE

## 2017-01-01 PROCEDURE — 0DJ08ZZ INSPECTION OF UPPER INTESTINAL TRACT, VIA NATURAL OR ARTIFICIAL OPENING ENDOSCOPIC: ICD-10-PCS | Performed by: INTERNAL MEDICINE

## 2017-01-01 PROCEDURE — 97165 OT EVAL LOW COMPLEX 30 MIN: CPT

## 2017-01-01 RX ORDER — ONDANSETRON 2 MG/ML
INJECTION INTRAMUSCULAR; INTRAVENOUS
Status: DISCONTINUED | OUTPATIENT
Start: 2017-01-01 | End: 2017-01-01

## 2017-01-01 RX ORDER — PANTOPRAZOLE SODIUM 40 MG/10ML
40 INJECTION, POWDER, LYOPHILIZED, FOR SOLUTION INTRAVENOUS 2 TIMES DAILY
Status: DISCONTINUED | OUTPATIENT
Start: 2017-01-01 | End: 2017-01-01

## 2017-01-01 RX ORDER — WARFARIN 3 MG/1
3 TABLET ORAL DAILY
Status: DISCONTINUED | OUTPATIENT
Start: 2017-01-01 | End: 2017-01-01

## 2017-01-01 RX ORDER — WARFARIN 1 MG/1
1 TABLET ORAL ONCE
Status: COMPLETED | OUTPATIENT
Start: 2017-01-01 | End: 2017-01-01

## 2017-01-01 RX ORDER — FUROSEMIDE 40 MG/1
40 TABLET ORAL 2 TIMES DAILY
Qty: 60 TABLET | Refills: 11 | Status: ON HOLD | OUTPATIENT
Start: 2017-01-01 | End: 2018-01-01 | Stop reason: HOSPADM

## 2017-01-01 RX ORDER — PHENYLEPHRINE HYDROCHLORIDE 10 MG/ML
INJECTION INTRAVENOUS
Status: DISCONTINUED | OUTPATIENT
Start: 2017-01-01 | End: 2017-01-01

## 2017-01-01 RX ORDER — SODIUM CHLORIDE 9 MG/ML
INJECTION, SOLUTION INTRAVENOUS CONTINUOUS PRN
Status: DISCONTINUED | OUTPATIENT
Start: 2017-01-01 | End: 2017-01-01

## 2017-01-01 RX ORDER — ASPIRIN 81 MG/1
81 TABLET ORAL DAILY
Status: DISCONTINUED | OUTPATIENT
Start: 2017-01-01 | End: 2017-01-01

## 2017-01-01 RX ORDER — PANTOPRAZOLE SODIUM 40 MG/10ML
40 INJECTION, POWDER, LYOPHILIZED, FOR SOLUTION INTRAVENOUS DAILY
Status: DISCONTINUED | OUTPATIENT
Start: 2017-01-01 | End: 2017-01-01

## 2017-01-01 RX ORDER — POLYETHYLENE GLYCOL 3350 17 G/17G
17 POWDER, FOR SOLUTION ORAL DAILY
Status: DISCONTINUED | OUTPATIENT
Start: 2017-01-01 | End: 2017-01-01 | Stop reason: HOSPADM

## 2017-01-01 RX ORDER — LANOLIN ALCOHOL/MO/W.PET/CERES
400 CREAM (GRAM) TOPICAL 2 TIMES DAILY
Status: DISCONTINUED | OUTPATIENT
Start: 2017-01-01 | End: 2017-01-01 | Stop reason: HOSPADM

## 2017-01-01 RX ORDER — WARFARIN 4 MG/1
4 TABLET ORAL DAILY
Status: DISCONTINUED | OUTPATIENT
Start: 2017-01-01 | End: 2017-01-01 | Stop reason: HOSPADM

## 2017-01-01 RX ORDER — ETOMIDATE 2 MG/ML
INJECTION INTRAVENOUS
Status: DISCONTINUED | OUTPATIENT
Start: 2017-01-01 | End: 2017-01-01

## 2017-01-01 RX ORDER — PANTOPRAZOLE SODIUM 40 MG/1
40 TABLET, DELAYED RELEASE ORAL DAILY
Status: DISCONTINUED | OUTPATIENT
Start: 2017-01-01 | End: 2017-01-01

## 2017-01-01 RX ORDER — AMLODIPINE BESYLATE 10 MG/1
10 TABLET ORAL DAILY
Qty: 30 TABLET | Refills: 11 | Status: ON HOLD | OUTPATIENT
Start: 2017-01-01 | End: 2018-01-01

## 2017-01-01 RX ORDER — WARFARIN SODIUM 5 MG/1
5 TABLET ORAL ONCE
Status: COMPLETED | OUTPATIENT
Start: 2017-01-01 | End: 2017-01-01

## 2017-01-01 RX ORDER — FUROSEMIDE 40 MG/1
40 TABLET ORAL 2 TIMES DAILY
Status: DISCONTINUED | OUTPATIENT
Start: 2017-01-01 | End: 2017-01-01 | Stop reason: HOSPADM

## 2017-01-01 RX ORDER — POTASSIUM CHLORIDE 750 MG/1
10 CAPSULE, EXTENDED RELEASE ORAL ONCE
Status: COMPLETED | OUTPATIENT
Start: 2017-01-01 | End: 2017-01-01

## 2017-01-01 RX ORDER — WARFARIN 2 MG/1
TABLET ORAL
Qty: 45 TABLET | Refills: 11 | Status: ON HOLD
Start: 2017-01-01 | End: 2018-01-01

## 2017-01-01 RX ORDER — PANTOPRAZOLE SODIUM 40 MG/1
40 TABLET, DELAYED RELEASE ORAL
Status: DISCONTINUED | OUTPATIENT
Start: 2017-01-01 | End: 2017-01-01 | Stop reason: HOSPADM

## 2017-01-01 RX ORDER — AMLODIPINE BESYLATE 10 MG/1
10 TABLET ORAL DAILY
Status: DISCONTINUED | OUTPATIENT
Start: 2017-01-01 | End: 2017-01-01 | Stop reason: HOSPADM

## 2017-01-01 RX ORDER — WARFARIN 3 MG/1
3 TABLET ORAL
Status: DISCONTINUED | OUTPATIENT
Start: 2017-01-01 | End: 2017-01-01

## 2017-01-01 RX ORDER — HEPARIN SODIUM,PORCINE/D5W 25000/250
17 INTRAVENOUS SOLUTION INTRAVENOUS CONTINUOUS
Status: DISCONTINUED | OUTPATIENT
Start: 2017-01-01 | End: 2017-01-01

## 2017-01-01 RX ORDER — POTASSIUM CHLORIDE 20 MEQ/15ML
60 SOLUTION ORAL ONCE
Status: COMPLETED | OUTPATIENT
Start: 2017-01-01 | End: 2017-01-01

## 2017-01-01 RX ORDER — WARFARIN 3 MG/1
3 TABLET ORAL ONCE
Status: COMPLETED | OUTPATIENT
Start: 2017-01-01 | End: 2017-01-01

## 2017-01-01 RX ORDER — HYDRALAZINE HYDROCHLORIDE 50 MG/1
50 TABLET, FILM COATED ORAL EVERY 8 HOURS
Status: DISCONTINUED | OUTPATIENT
Start: 2017-01-01 | End: 2017-01-01 | Stop reason: HOSPADM

## 2017-01-01 RX ORDER — POTASSIUM CHLORIDE 20 MEQ/1
40 TABLET, EXTENDED RELEASE ORAL ONCE
Status: COMPLETED | OUTPATIENT
Start: 2017-01-01 | End: 2017-01-01

## 2017-01-01 RX ORDER — PROPOFOL 10 MG/ML
VIAL (ML) INTRAVENOUS
Status: DISCONTINUED | OUTPATIENT
Start: 2017-01-01 | End: 2017-01-01

## 2017-01-01 RX ORDER — PANTOPRAZOLE SODIUM 40 MG/1
40 TABLET, DELAYED RELEASE ORAL
Qty: 60 TABLET | Refills: 11 | Status: SHIPPED | OUTPATIENT
Start: 2017-01-01 | End: 2018-01-01

## 2017-01-01 RX ORDER — WARFARIN 2 MG/1
2 TABLET ORAL
Status: DISCONTINUED | OUTPATIENT
Start: 2017-01-01 | End: 2017-01-01

## 2017-01-01 RX ORDER — HEPARIN SODIUM,PORCINE/D5W 25000/250
17 INTRAVENOUS SOLUTION INTRAVENOUS CONTINUOUS
Status: DISCONTINUED | OUTPATIENT
Start: 2017-01-01 | End: 2017-01-01 | Stop reason: HOSPADM

## 2017-01-01 RX ORDER — DRONABINOL 2.5 MG/1
5 CAPSULE ORAL
Status: DISCONTINUED | OUTPATIENT
Start: 2017-01-01 | End: 2017-01-01 | Stop reason: HOSPADM

## 2017-01-01 RX ORDER — ROCURONIUM BROMIDE 10 MG/ML
INJECTION, SOLUTION INTRAVENOUS
Status: DISCONTINUED | OUTPATIENT
Start: 2017-01-01 | End: 2017-01-01

## 2017-01-01 RX ORDER — LACTULOSE 10 G/15ML
30 SOLUTION ORAL ONCE
Status: COMPLETED | OUTPATIENT
Start: 2017-01-01 | End: 2017-01-01

## 2017-01-01 RX ORDER — PRAVASTATIN SODIUM 20 MG/1
20 TABLET ORAL NIGHTLY
Status: DISCONTINUED | OUTPATIENT
Start: 2017-01-01 | End: 2017-01-01 | Stop reason: HOSPADM

## 2017-01-01 RX ORDER — SUCCINYLCHOLINE CHLORIDE 20 MG/ML
INJECTION INTRAMUSCULAR; INTRAVENOUS
Status: DISCONTINUED | OUTPATIENT
Start: 2017-01-01 | End: 2017-01-01

## 2017-01-01 RX ORDER — ALPRAZOLAM 0.25 MG/1
0.25 TABLET ORAL 2 TIMES DAILY PRN
Status: DISCONTINUED | OUTPATIENT
Start: 2017-01-01 | End: 2017-01-01 | Stop reason: HOSPADM

## 2017-01-01 RX ORDER — PANTOPRAZOLE SODIUM 40 MG/1
40 TABLET, DELAYED RELEASE ORAL
Status: DISCONTINUED | OUTPATIENT
Start: 2017-01-01 | End: 2017-01-01

## 2017-01-01 RX ORDER — POTASSIUM CHLORIDE 750 MG/1
20 CAPSULE, EXTENDED RELEASE ORAL 2 TIMES DAILY
Status: DISCONTINUED | OUTPATIENT
Start: 2017-01-01 | End: 2017-01-01 | Stop reason: HOSPADM

## 2017-01-01 RX ORDER — MUPIROCIN 20 MG/G
1 OINTMENT TOPICAL 2 TIMES DAILY
Status: DISCONTINUED | OUTPATIENT
Start: 2017-01-01 | End: 2017-01-01

## 2017-01-01 RX ORDER — MUPIROCIN 20 MG/G
1 OINTMENT TOPICAL 2 TIMES DAILY
Status: CANCELLED | OUTPATIENT
Start: 2017-01-01 | End: 2018-01-01

## 2017-01-01 RX ORDER — POLYETHYLENE GLYCOL 3350 17 G/17G
17 POWDER, FOR SOLUTION ORAL DAILY PRN
Qty: 1700 G | Refills: 3 | Status: ON HOLD
Start: 2017-01-01 | End: 2018-01-01 | Stop reason: HOSPADM

## 2017-01-01 RX ORDER — SODIUM CHLORIDE 0.9 % (FLUSH) 0.9 %
5 SYRINGE (ML) INJECTION
Status: DISCONTINUED | OUTPATIENT
Start: 2017-01-01 | End: 2017-01-01 | Stop reason: HOSPADM

## 2017-01-01 RX ORDER — POTASSIUM CHLORIDE 20 MEQ/1
40 TABLET, EXTENDED RELEASE ORAL DAILY
Qty: 60 TABLET | Refills: 11 | Status: ON HOLD
Start: 2017-01-01 | End: 2018-01-01 | Stop reason: ALTCHOICE

## 2017-01-01 RX ORDER — HYDROCODONE BITARTRATE AND ACETAMINOPHEN 500; 5 MG/1; MG/1
TABLET ORAL
Status: DISCONTINUED | OUTPATIENT
Start: 2017-01-01 | End: 2017-01-01 | Stop reason: HOSPADM

## 2017-01-01 RX ORDER — WARFARIN 2 MG/1
2 TABLET ORAL ONCE
Status: COMPLETED | OUTPATIENT
Start: 2017-01-01 | End: 2017-01-01

## 2017-01-01 RX ORDER — POTASSIUM CHLORIDE 20 MEQ/1
40 TABLET, EXTENDED RELEASE ORAL 2 TIMES DAILY
Qty: 60 TABLET | Refills: 11 | Status: ON HOLD | OUTPATIENT
Start: 2017-01-01 | End: 2017-01-01

## 2017-01-01 RX ORDER — ONDANSETRON 2 MG/ML
8 INJECTION INTRAMUSCULAR; INTRAVENOUS EVERY 8 HOURS PRN
Status: DISCONTINUED | OUTPATIENT
Start: 2017-01-01 | End: 2017-01-01 | Stop reason: HOSPADM

## 2017-01-01 RX ORDER — POTASSIUM CHLORIDE 750 MG/1
20 CAPSULE, EXTENDED RELEASE ORAL ONCE
Status: COMPLETED | OUTPATIENT
Start: 2017-01-01 | End: 2017-01-01

## 2017-01-01 RX ORDER — DRONABINOL 5 MG/1
5 CAPSULE ORAL
Qty: 90 CAPSULE | Refills: 5 | Status: ON HOLD
Start: 2017-01-01 | End: 2018-01-01

## 2017-01-01 RX ORDER — WARFARIN 2 MG/1
4 TABLET ORAL ONCE
Status: COMPLETED | OUTPATIENT
Start: 2017-01-01 | End: 2017-01-01

## 2017-01-01 RX ORDER — POLYETHYLENE GLYCOL 3350 17 G/17G
17 POWDER, FOR SOLUTION ORAL DAILY PRN
Status: DISCONTINUED | OUTPATIENT
Start: 2017-01-01 | End: 2017-01-01 | Stop reason: HOSPADM

## 2017-01-01 RX ORDER — LIDOCAINE HCL/PF 100 MG/5ML
SYRINGE (ML) INTRAVENOUS
Status: DISCONTINUED | OUTPATIENT
Start: 2017-01-01 | End: 2017-01-01

## 2017-01-01 RX ORDER — POTASSIUM CHLORIDE 750 MG/1
40 CAPSULE, EXTENDED RELEASE ORAL ONCE
Status: COMPLETED | OUTPATIENT
Start: 2017-01-01 | End: 2017-01-01

## 2017-01-01 RX ORDER — SODIUM CHLORIDE 0.9 % (FLUSH) 0.9 %
3 SYRINGE (ML) INJECTION
Status: DISCONTINUED | OUTPATIENT
Start: 2017-01-01 | End: 2017-01-01 | Stop reason: HOSPADM

## 2017-01-01 RX ORDER — METHYLPREDNISOLONE SOD SUCC 125 MG
125 VIAL (EA) INJECTION ONCE
Status: COMPLETED | OUTPATIENT
Start: 2017-01-01 | End: 2017-01-01

## 2017-01-01 RX ORDER — DIPHENHYDRAMINE HYDROCHLORIDE 50 MG/ML
12.5 INJECTION INTRAMUSCULAR; INTRAVENOUS ONCE
Status: COMPLETED | OUTPATIENT
Start: 2017-01-01 | End: 2017-01-01

## 2017-01-01 RX ORDER — POTASSIUM CHLORIDE 20 MEQ/1
20 TABLET, EXTENDED RELEASE ORAL ONCE
Status: COMPLETED | OUTPATIENT
Start: 2017-01-01 | End: 2017-01-01

## 2017-01-01 RX ORDER — POTASSIUM CHLORIDE 20 MEQ/1
40 TABLET, EXTENDED RELEASE ORAL DAILY
Status: DISCONTINUED | OUTPATIENT
Start: 2017-01-01 | End: 2017-01-01

## 2017-01-01 RX ORDER — PANTOPRAZOLE SODIUM 40 MG/1
40 TABLET, DELAYED RELEASE ORAL 2 TIMES DAILY
Status: DISCONTINUED | OUTPATIENT
Start: 2017-01-01 | End: 2017-01-01 | Stop reason: HOSPADM

## 2017-01-01 RX ORDER — POTASSIUM CHLORIDE 20 MEQ/1
20 TABLET, EXTENDED RELEASE ORAL DAILY
Status: DISCONTINUED | OUTPATIENT
Start: 2017-01-01 | End: 2017-01-01

## 2017-01-01 RX ADMIN — PANTOPRAZOLE SODIUM 40 MG: 40 TABLET, DELAYED RELEASE ORAL at 06:12

## 2017-01-01 RX ADMIN — Medication 400 MG: at 10:12

## 2017-01-01 RX ADMIN — AMLODIPINE BESYLATE 10 MG: 10 TABLET ORAL at 09:12

## 2017-01-01 RX ADMIN — PRAVASTATIN SODIUM 20 MG: 20 TABLET ORAL at 09:12

## 2017-01-01 RX ADMIN — Medication 400 MG: at 09:12

## 2017-01-01 RX ADMIN — HYDRALAZINE HYDROCHLORIDE 50 MG: 50 TABLET ORAL at 05:12

## 2017-01-01 RX ADMIN — HYDRALAZINE HYDROCHLORIDE 50 MG: 50 TABLET ORAL at 02:12

## 2017-01-01 RX ADMIN — FUROSEMIDE 40 MG: 40 TABLET ORAL at 09:12

## 2017-01-01 RX ADMIN — MAGNESIUM OXIDE TAB 400 MG (241.3 MG ELEMENTAL MG) 400 MG: 400 (241.3 MG) TAB at 08:12

## 2017-01-01 RX ADMIN — PANTOPRAZOLE SODIUM 40 MG: 40 INJECTION, POWDER, FOR SOLUTION INTRAVENOUS at 10:12

## 2017-01-01 RX ADMIN — HYDRALAZINE HYDROCHLORIDE 50 MG: 50 TABLET ORAL at 10:12

## 2017-01-01 RX ADMIN — POTASSIUM CHLORIDE 20 MEQ: 750 CAPSULE, EXTENDED RELEASE ORAL at 08:12

## 2017-01-01 RX ADMIN — PANTOPRAZOLE SODIUM 40 MG: 40 TABLET, DELAYED RELEASE ORAL at 05:12

## 2017-01-01 RX ADMIN — FUROSEMIDE 40 MG: 40 TABLET ORAL at 06:12

## 2017-01-01 RX ADMIN — PANTOPRAZOLE SODIUM 40 MG: 40 TABLET, DELAYED RELEASE ORAL at 04:12

## 2017-01-01 RX ADMIN — HEPARIN SODIUM 10 UNITS/KG/HR: 5000 INJECTION, SOLUTION INTRAVENOUS; SUBCUTANEOUS at 11:12

## 2017-01-01 RX ADMIN — FUROSEMIDE 40 MG: 40 TABLET ORAL at 05:12

## 2017-01-01 RX ADMIN — AMLODIPINE BESYLATE 10 MG: 10 TABLET ORAL at 08:12

## 2017-01-01 RX ADMIN — PANTOPRAZOLE SODIUM 40 MG: 40 INJECTION, POWDER, FOR SOLUTION INTRAVENOUS at 09:12

## 2017-01-01 RX ADMIN — POTASSIUM CHLORIDE 20 MEQ: 750 CAPSULE, EXTENDED RELEASE ORAL at 10:12

## 2017-01-01 RX ADMIN — PRAVASTATIN SODIUM 20 MG: 20 TABLET ORAL at 08:12

## 2017-01-01 RX ADMIN — HEPARIN SODIUM AND DEXTROSE 21 UNITS/KG/HR: 10000; 5 INJECTION INTRAVENOUS at 08:12

## 2017-01-01 RX ADMIN — HEPARIN SODIUM 16 UNITS/KG/HR: 5000 INJECTION, SOLUTION INTRAVENOUS; SUBCUTANEOUS at 09:12

## 2017-01-01 RX ADMIN — POTASSIUM CHLORIDE 20 MEQ: 750 CAPSULE, EXTENDED RELEASE ORAL at 09:12

## 2017-01-01 RX ADMIN — POLYETHYLENE GLYCOL 3350 17 G: 17 POWDER, FOR SOLUTION ORAL at 08:12

## 2017-01-01 RX ADMIN — FUROSEMIDE 40 MG: 40 TABLET ORAL at 08:12

## 2017-01-01 RX ADMIN — POTASSIUM CHLORIDE 40 MEQ: 1500 TABLET, EXTENDED RELEASE ORAL at 10:12

## 2017-01-01 RX ADMIN — HYDRALAZINE HYDROCHLORIDE 50 MG: 50 TABLET ORAL at 09:12

## 2017-01-01 RX ADMIN — POTASSIUM CHLORIDE 10 MEQ: 750 CAPSULE, EXTENDED RELEASE ORAL at 10:12

## 2017-01-01 RX ADMIN — HEPARIN SODIUM 16 UNITS/KG/HR: 5000 INJECTION, SOLUTION INTRAVENOUS; SUBCUTANEOUS at 05:12

## 2017-01-01 RX ADMIN — METHYLPREDNISOLONE SODIUM SUCCINATE 125 MG: 125 INJECTION, POWDER, FOR SOLUTION INTRAMUSCULAR; INTRAVENOUS at 10:12

## 2017-01-01 RX ADMIN — ETOMIDATE 6 MG: 2 INJECTION, SOLUTION INTRAVENOUS at 02:12

## 2017-01-01 RX ADMIN — PANTOPRAZOLE SODIUM 40 MG: 40 TABLET, DELAYED RELEASE ORAL at 08:12

## 2017-01-01 RX ADMIN — WARFARIN SODIUM 4 MG: 2 TABLET ORAL at 04:12

## 2017-01-01 RX ADMIN — HYDRALAZINE HYDROCHLORIDE 50 MG: 50 TABLET ORAL at 08:12

## 2017-01-01 RX ADMIN — POLYETHYLENE GLYCOL 3350 17 G: 17 POWDER, FOR SOLUTION ORAL at 11:12

## 2017-01-01 RX ADMIN — ASPIRIN 81 MG: 81 TABLET, COATED ORAL at 01:12

## 2017-01-01 RX ADMIN — POTASSIUM CHLORIDE 60 MEQ: 20 SOLUTION ORAL at 11:12

## 2017-01-01 RX ADMIN — HEPARIN SODIUM 16 UNITS/KG/HR: 5000 INJECTION, SOLUTION INTRAVENOUS; SUBCUTANEOUS at 10:12

## 2017-01-01 RX ADMIN — PANTOPRAZOLE SODIUM 40 MG: 40 INJECTION, POWDER, FOR SOLUTION INTRAVENOUS at 08:12

## 2017-01-01 RX ADMIN — SUCCINYLCHOLINE CHLORIDE 100 MG: 20 INJECTION, SOLUTION INTRAMUSCULAR; INTRAVENOUS at 01:12

## 2017-01-01 RX ADMIN — FUROSEMIDE 40 MG: 40 TABLET ORAL at 10:12

## 2017-01-01 RX ADMIN — WARFARIN SODIUM 1 MG: 1 TABLET ORAL at 05:12

## 2017-01-01 RX ADMIN — ASPIRIN 81 MG: 81 TABLET, COATED ORAL at 10:12

## 2017-01-01 RX ADMIN — WARFARIN SODIUM 3 MG: 2 TABLET ORAL at 05:12

## 2017-01-01 RX ADMIN — SODIUM CHLORIDE: 0.9 INJECTION, SOLUTION INTRAVENOUS at 01:12

## 2017-01-01 RX ADMIN — WARFARIN SODIUM 3 MG: 3 TABLET ORAL at 05:12

## 2017-01-01 RX ADMIN — DRONABINOL 5 MG: 2.5 CAPSULE ORAL at 05:12

## 2017-01-01 RX ADMIN — PHENYLEPHRINE HYDROCHLORIDE 100 MCG: 10 INJECTION INTRAVENOUS at 01:12

## 2017-01-01 RX ADMIN — WARFARIN SODIUM 1 MG: 2 TABLET ORAL at 05:12

## 2017-01-01 RX ADMIN — Medication 400 MG: at 08:12

## 2017-01-01 RX ADMIN — DIPHENHYDRAMINE HYDROCHLORIDE 12.5 MG: 50 INJECTION, SOLUTION INTRAMUSCULAR; INTRAVENOUS at 10:12

## 2017-01-01 RX ADMIN — ONDANSETRON 8 MG: 2 INJECTION INTRAMUSCULAR; INTRAVENOUS at 09:12

## 2017-01-01 RX ADMIN — ALPRAZOLAM 0.25 MG: 0.25 TABLET ORAL at 12:12

## 2017-01-01 RX ADMIN — HEPARIN SODIUM AND DEXTROSE 17 UNITS/KG/HR: 10000; 5 INJECTION INTRAVENOUS at 11:12

## 2017-01-01 RX ADMIN — DRONABINOL 5 MG: 2.5 CAPSULE ORAL at 12:12

## 2017-01-01 RX ADMIN — LIDOCAINE HYDROCHLORIDE 80 MG: 20 INJECTION, SOLUTION INTRAVENOUS at 02:12

## 2017-01-01 RX ADMIN — DRONABINOL 5 MG: 2.5 CAPSULE ORAL at 06:12

## 2017-01-01 RX ADMIN — POTASSIUM CHLORIDE 40 MEQ: 750 CAPSULE, EXTENDED RELEASE ORAL at 08:12

## 2017-01-01 RX ADMIN — POLYETHYLENE GLYCOL 3350 17 G: 17 POWDER, FOR SOLUTION ORAL at 02:12

## 2017-01-01 RX ADMIN — HYDRALAZINE HYDROCHLORIDE 50 MG: 50 TABLET ORAL at 01:12

## 2017-01-01 RX ADMIN — SODIUM CHLORIDE: 0.9 INJECTION, SOLUTION INTRAVENOUS at 12:12

## 2017-01-01 RX ADMIN — DRONABINOL 5 MG: 2.5 CAPSULE ORAL at 10:12

## 2017-01-01 RX ADMIN — FUROSEMIDE 40 MG: 40 TABLET ORAL at 11:12

## 2017-01-01 RX ADMIN — LIDOCAINE HYDROCHLORIDE 50 MG: 20 INJECTION, SOLUTION INTRAVENOUS at 01:12

## 2017-01-01 RX ADMIN — ETOMIDATE 10 MG: 2 INJECTION, SOLUTION INTRAVENOUS at 01:12

## 2017-01-01 RX ADMIN — PANTOPRAZOLE SODIUM 40 MG: 40 INJECTION, POWDER, FOR SOLUTION INTRAVENOUS at 06:12

## 2017-01-01 RX ADMIN — HEPARIN SODIUM 16 UNITS/KG/HR: 5000 INJECTION, SOLUTION INTRAVENOUS; SUBCUTANEOUS at 11:12

## 2017-01-01 RX ADMIN — POTASSIUM CHLORIDE 10 MEQ: 750 CAPSULE, EXTENDED RELEASE ORAL at 08:12

## 2017-01-01 RX ADMIN — ONDANSETRON 4 MG: 2 INJECTION INTRAMUSCULAR; INTRAVENOUS at 01:12

## 2017-01-01 RX ADMIN — SODIUM FERRIC GLUCONATE COMPLEX 250 MG: 12.5 INJECTION INTRAVENOUS at 01:12

## 2017-01-01 RX ADMIN — AMLODIPINE BESYLATE 10 MG: 10 TABLET ORAL at 11:12

## 2017-01-01 RX ADMIN — WARFARIN SODIUM 5 MG: 5 TABLET ORAL at 05:12

## 2017-01-01 RX ADMIN — PANTOPRAZOLE SODIUM 40 MG: 40 TABLET, DELAYED RELEASE ORAL at 10:12

## 2017-01-01 RX ADMIN — PROPOFOL 30 MG: 10 INJECTION, EMULSION INTRAVENOUS at 01:12

## 2017-01-01 RX ADMIN — HYDRALAZINE HYDROCHLORIDE 50 MG: 50 TABLET ORAL at 06:12

## 2017-01-01 RX ADMIN — LACTULOSE 30 G: 20 SOLUTION ORAL at 02:12

## 2017-01-01 RX ADMIN — HEPARIN SODIUM AND DEXTROSE 21 UNITS/KG/HR: 10000; 5 INJECTION INTRAVENOUS at 05:12

## 2017-01-01 RX ADMIN — HEPARIN SODIUM AND DEXTROSE 21 UNITS/KG/HR: 10000; 5 INJECTION INTRAVENOUS at 06:12

## 2017-01-01 RX ADMIN — IOHEXOL 100 ML: 350 INJECTION, SOLUTION INTRAVENOUS at 09:12

## 2017-01-01 RX ADMIN — WARFARIN SODIUM 2 MG: 2 TABLET ORAL at 05:12

## 2017-01-01 RX ADMIN — AMLODIPINE BESYLATE 10 MG: 10 TABLET ORAL at 10:12

## 2017-01-01 RX ADMIN — HYDRALAZINE HYDROCHLORIDE 50 MG: 50 TABLET ORAL at 03:12

## 2017-01-01 RX ADMIN — WARFARIN SODIUM 2 MG: 2 TABLET ORAL at 10:12

## 2017-01-01 RX ADMIN — WARFARIN SODIUM 3 MG: 3 TABLET ORAL at 04:12

## 2017-01-01 RX ADMIN — POTASSIUM CHLORIDE 20 MEQ: 1500 TABLET, EXTENDED RELEASE ORAL at 01:12

## 2017-01-01 RX ADMIN — ONDANSETRON 8 MG: 2 INJECTION INTRAMUSCULAR; INTRAVENOUS at 07:12

## 2017-01-01 RX ADMIN — PRAVASTATIN SODIUM 20 MG: 20 TABLET ORAL at 10:12

## 2017-01-01 RX ADMIN — WARFARIN SODIUM 4 MG: 4 TABLET ORAL at 05:12

## 2017-01-01 RX ADMIN — ROCURONIUM BROMIDE 10 MG: 10 INJECTION, SOLUTION INTRAVENOUS at 01:12

## 2017-01-01 RX ADMIN — SODIUM FERRIC GLUCONATE COMPLEX 250 MG: 12.5 INJECTION INTRAVENOUS at 08:12

## 2017-01-01 RX ADMIN — POLYETHYLENE GLYCOL 3350 17 G: 17 POWDER, FOR SOLUTION ORAL at 09:12

## 2017-05-31 ENCOUNTER — TELEPHONE (OUTPATIENT)
Dept: TRANSPLANT | Facility: CLINIC | Age: 67
End: 2017-05-31

## 2017-05-31 DIAGNOSIS — I50.9 CONGESTIVE HEART FAILURE, UNSPECIFIED CONGESTIVE HEART FAILURE CHRONICITY, UNSPECIFIED CONGESTIVE HEART FAILURE TYPE: Primary | ICD-10-CM

## 2017-05-31 NOTE — TELEPHONE ENCOUNTER
Voice message left for patient regarding referral consult by his local cardiologist to call back to schedule appt, contact number provided. Will continue to fu.     Medical records from referring MD downloaded in [employer] and sent to medical records to be scanned into media.

## 2017-06-01 NOTE — TELEPHONE ENCOUNTER
"Patient wife called back.    REFERRAL NOTE:    Suman Hayden has been referred to the pre-heart transplant office for consideration for orthotopic heart transplantation by Joe Ernst .  Patient's appointments have been scheduled for 6/27/17, per patient's wife request.  Information was provided and questions were answered.  Copies of "What to Expect" and were mailed to the patient.  "

## 2017-06-25 DIAGNOSIS — I42.8 NONISCHEMIC CARDIOMYOPATHY: Primary | ICD-10-CM

## 2017-06-27 ENCOUNTER — INITIAL CONSULT (OUTPATIENT)
Dept: TRANSPLANT | Facility: CLINIC | Age: 67
End: 2017-06-27
Payer: MEDICARE

## 2017-06-27 ENCOUNTER — HOSPITAL ENCOUNTER (OUTPATIENT)
Dept: PULMONOLOGY | Facility: CLINIC | Age: 67
Discharge: HOME | End: 2017-06-27
Payer: MEDICARE

## 2017-06-27 VITALS
SYSTOLIC BLOOD PRESSURE: 103 MMHG | HEART RATE: 60 BPM | HEIGHT: 71 IN | DIASTOLIC BLOOD PRESSURE: 69 MMHG | BODY MASS INDEX: 29.69 KG/M2 | WEIGHT: 212.06 LBS

## 2017-06-27 VITALS — WEIGHT: 211.44 LBS | HEIGHT: 68 IN | BODY MASS INDEX: 32.05 KG/M2

## 2017-06-27 DIAGNOSIS — Z51.81 ENCOUNTER FOR MONITORING AMIODARONE THERAPY: ICD-10-CM

## 2017-06-27 DIAGNOSIS — I11.0 HYPERTENSIVE HEART DISEASE WITH HEART FAILURE: ICD-10-CM

## 2017-06-27 DIAGNOSIS — I42.8 NONISCHEMIC CARDIOMYOPATHY: Primary | ICD-10-CM

## 2017-06-27 DIAGNOSIS — Z79.899 ENCOUNTER FOR MONITORING AMIODARONE THERAPY: ICD-10-CM

## 2017-06-27 DIAGNOSIS — I42.8 NONISCHEMIC CARDIOMYOPATHY: ICD-10-CM

## 2017-06-27 DIAGNOSIS — I50.84 CHF (NYHA CLASS IV, ACC/AHA STAGE D): ICD-10-CM

## 2017-06-27 PROCEDURE — 94620 PR PULMONARY STRESS TESTING,SIMPLE: CPT | Mod: 26,S$PBB,TXP, | Performed by: INTERNAL MEDICINE

## 2017-06-27 PROCEDURE — 1126F AMNT PAIN NOTED NONE PRSNT: CPT | Mod: TXP,,, | Performed by: INTERNAL MEDICINE

## 2017-06-27 PROCEDURE — 99999 PR PBB SHADOW E&M-EST. PATIENT-LVL III: CPT | Mod: PBBFAC,TXP,, | Performed by: INTERNAL MEDICINE

## 2017-06-27 PROCEDURE — 94620 PR PULMONARY STRESS TESTING,SIMPLE: CPT | Mod: PBBFAC,TXP | Performed by: INTERNAL MEDICINE

## 2017-06-27 PROCEDURE — 1159F MED LIST DOCD IN RCRD: CPT | Mod: TXP,,, | Performed by: INTERNAL MEDICINE

## 2017-06-27 PROCEDURE — 99205 OFFICE O/P NEW HI 60 MIN: CPT | Mod: S$PBB,,, | Performed by: INTERNAL MEDICINE

## 2017-06-27 RX ORDER — CARVEDILOL 12.5 MG/1
12.5 TABLET ORAL 2 TIMES DAILY
Status: ON HOLD | COMMUNITY
End: 2017-07-09 | Stop reason: HOSPADM

## 2017-06-27 RX ORDER — FUROSEMIDE 40 MG/1
40 TABLET ORAL 2 TIMES DAILY
Status: ON HOLD | COMMUNITY
End: 2017-07-09 | Stop reason: HOSPADM

## 2017-06-27 RX ORDER — NAPROXEN SODIUM 220 MG/1
81 TABLET, FILM COATED ORAL DAILY
Status: ON HOLD | COMMUNITY
End: 2017-01-01 | Stop reason: SINTOL

## 2017-06-27 RX ORDER — POTASSIUM CHLORIDE 750 MG/1
10 CAPSULE, EXTENDED RELEASE ORAL DAILY
Status: ON HOLD | COMMUNITY
End: 2017-07-03

## 2017-06-27 RX ORDER — AMIODARONE HYDROCHLORIDE 200 MG/1
TABLET ORAL DAILY
Status: ON HOLD | COMMUNITY
End: 2017-09-20 | Stop reason: HOSPADM

## 2017-06-27 RX ORDER — POTASSIUM CHLORIDE 20 MEQ/1
20 TABLET, EXTENDED RELEASE ORAL DAILY
Status: ON HOLD | COMMUNITY
End: 2017-09-20 | Stop reason: HOSPADM

## 2017-06-27 RX ORDER — PRAVASTATIN SODIUM 20 MG/1
20 TABLET ORAL NIGHTLY
COMMUNITY
End: 2017-10-26 | Stop reason: SDUPTHER

## 2017-06-27 RX ORDER — FUROSEMIDE 20 MG/1
20 TABLET ORAL 2 TIMES DAILY
Status: ON HOLD | COMMUNITY
End: 2017-07-09 | Stop reason: HOSPADM

## 2017-06-27 RX ORDER — POLYETHYLENE GLYCOL 3350 17 G/17G
POWDER, FOR SOLUTION ORAL
Status: ON HOLD | COMMUNITY
End: 2017-07-03

## 2017-06-27 RX ORDER — DULOXETIN HYDROCHLORIDE 60 MG/1
60 CAPSULE, DELAYED RELEASE ORAL DAILY
COMMUNITY
End: 2017-07-18

## 2017-06-27 NOTE — LETTER
June 27, 2017        Joe Ernst  [address]  [address]  [place] [address]  Phone: [phone]  Fax: [phone]             [facility] [address]  [facility] [address]  Phone: [phone]   Patient: Suman Hayden   MR Number: [id number]   YOB: 1950   Date of Visit: 6/27/2017       Dear Dr. Joe Ernst    Thank you for referring Suman Hayden to me for evaluation. Attached you will find relevant portions of my assessment and plan of care.    If you have questions, please do not hesitate to call me. I look forward to following Suman Hayden along with you.    Sincerely,    Ortega Leos MD    Enclosure    If you would like to receive this communication electronically, please contact [email or web address] or [phone] to request doo Link access.    doo Link is a tool which provides read-only access to select patient information with whom you have a relationship. Its easy to use and provides real time access to review your patients record including encounter summaries, notes, results, and demographic information.    If you feel you have received this communication in error or would no longer like to receive these types of communications, please e-mail [email or web address]

## 2017-06-27 NOTE — PROGRESS NOTES
"Subjective:   Initial evaluation of heart transplant candidacy.    HPI:  Mr. Hayden is a 67 y.o. year old Unknown male who has presents to be considered for advanced surgical options (LVAD/OHT).  CHF since 2006 who has had progressive decline since Jan 2017 as he has required monthly admission.  Feels marginally better since starting 24/256 entresto in early June 2017.  Still has PEREIRA after one block, early satiety, moderate edema, and three pillow orthopnea.   Six minute walk limited at 244 m (required multiple stops)    Past Medical History:   Diagnosis Date    Cardiomyopathy     Hyperlipidemia     Hypertension     Obesity     S/P implantation of automatic cardioverter/defibrillator (AICD)     Ventricular tachycardia      Past Surgical History:   Procedure Laterality Date    CARDIAC CATHETERIZATION      CARDIAC DEFIBRILLATOR PLACEMENT         No family history on file.    Review of Systems   Constitution: Negative for decreased appetite, weight gain and weight loss.   Cardiovascular: Positive for dyspnea on exertion and orthopnea. Negative for chest pain, leg swelling, near-syncope and palpitations.   Respiratory: Negative for cough and shortness of breath.    Musculoskeletal: Negative for myalgias.   Gastrointestinal: Negative for jaundice.       Objective:   Blood pressure 103/69, pulse 60, height 5' 11" (1.803 m), weight 96.2 kg (212 lb 1.3 oz).body mass index is 29.58 kg/m².    Physical Exam   Constitutional: He is oriented to person, place, and time. He appears well-developed and well-nourished. He is active. He is not intubated.   /69   Pulse 60   Ht 5' 11" (1.803 m)   Wt 96.2 kg (212 lb 1.3 oz)   BMI 29.58 kg/m²      HENT:   Head: Normocephalic and atraumatic. Hair is normal.   Right Ear: External ear normal.   Left Ear: External ear normal.   Nose: Nose normal. No nasal deformity. No epistaxis.  No foreign bodies.   Mouth/Throat: Mucous membranes are normal. Mucous membranes are not " cyanotic. No oropharyngeal exudate.   Eyes: Conjunctivae and EOM are normal. Pupils are equal, round, and reactive to light.   Neck: Neck supple. Hepatojugular reflux and JVD (JVP 10 ) present.   Cardiovascular: Normal rate, regular rhythm and normal pulses.  Exam reveals gallop.    Pulmonary/Chest: Effort normal and breath sounds normal. No apnea and no tachypnea. He is not intubated. No respiratory distress. He exhibits no tenderness.   Abdominal: Soft. Normal appearance and bowel sounds are normal. There is no tenderness. No hernia.   Musculoskeletal: Normal range of motion. He exhibits edema (ankle edema).   Neurological: He is alert and oriented to person, place, and time. He displays no seizure activity.   Skin: Skin is warm, dry and intact. No rash noted. No pallor.   Psychiatric: He has a normal mood and affect. His speech is normal and behavior is normal. Thought content normal. Cognition and memory are normal.       Labs:  Lab Results   Component Value Date    BNP 3,344 (H) 06/27/2017     06/27/2017    K 4.1 06/27/2017     06/27/2017    CO2 25 06/27/2017    BUN 22 06/27/2017    CREATININE 1.6 (H) 06/27/2017    GLU 84 06/27/2017    AST 20 06/27/2017    ALT 15 06/27/2017    ALBUMIN 3.4 (L) 06/27/2017    PROT 6.8 06/27/2017    BILITOT 2.6 (H) 06/27/2017       No results found for: MG    No results found for: WBC, HGB, HCT, MCV, PLT    No results found for: INR, PROTIME    BNP   Date Value Ref Range Status   06/27/2017 3,344 (H) 0 - 99 pg/mL Final     Comment:     Values of less than 100 pg/ml are consistent with non-CHF populations.         Assessment:      1. Nonischemic cardiomyopathy    2. CHF (NYHA class IV, ACC/AHA stage D)    3. Hypertensive heart disease with heart failure        Plan:   Nonischemic cardiomyopathy  Continue low dose entresto / coreg 12.5 BID    CHF (NYHA class IV, ACC/AHA stage D)  Monthly admission since Jan 2017 with elevated crt / t ani   Suspect he may need inotropes  eventually will get RHC as he is currently not on anticoagulation  Consider advanced CHF pathway if admitted after RHC    Encounter for monitoring amiodarone therapy  Need to find out if he needs / tolerates anticoagulation as he has a questionable history of a fib      Patient is now NYHA IV ACC stage D  Recommend 2 gram sodium restriction and 1500cc fluid restriction.  Encourage physical activity with graded exercise program.  Requested patient to weigh themselves daily, and to notify us if their weight increases by more than 3 lbs in 1 day or 5 lbs in 1 week.     Transplant Candidacy: Patient is a 67 y.o. year old male with heart failure is being seen for possible LVAD and OHT. In my opinion, he is  a suitable LVAD and OHT candidate. Patient did meet with  pre-transplant coordinator at the end of this visit for workup. he is scheduled for risk stratification [facility] with [facility]     DUANE Patient Status  Functional Status: 50% - Requires considerable assistance and frequent medical care  Physical Capacity: Limited Mobility  Working for Income: Unknown    Ortega Leos MD

## 2017-06-27 NOTE — PROGRESS NOTES
PRE-EDUCATION BOOKLET NOTE:    Met with Suman Hayden and had a brief discussion on the heart transplant evaluation process.    Heart Transplant Educational Booklet given to patient, which included the following handouts:  · Cardiomyopathy and Heart Transplantation  · Pre-transplant Evaluation Process  · Ventricular Assist Devices  · Wellness Contract  · Heart Transplant Information Outline  · Recipient Informed Consent  · Discharge Instructions for Patients with Heart Failure  · Advanced Directives  · Suggested web sites  · Multiple listing protocol and [facility] toll free numbers    Contact information provided.  All questions answered to patient's satisfaction.   Patient instructed to bring heart transplant educational booklet to the next appt when he returns for RHC as part of risk stratification.  Pt encouraged to pack a bag and be prepared to be admitted after RHC.  Pt and wife voiced understanding.

## 2017-06-28 ENCOUNTER — DOCUMENTATION ONLY (OUTPATIENT)
Dept: TRANSPLANT | Facility: CLINIC | Age: 67
End: 2017-06-28

## 2017-06-28 DIAGNOSIS — I42.8 OTHER CARDIOMYOPATHIES: ICD-10-CM

## 2017-06-28 DIAGNOSIS — I50.84 CHF (NYHA CLASS IV, ACC/AHA STAGE D): Primary | ICD-10-CM

## 2017-06-28 PROBLEM — Z51.81 ENCOUNTER FOR MONITORING AMIODARONE THERAPY: Status: ACTIVE | Noted: 2017-06-28

## 2017-06-28 PROBLEM — Z79.899 ENCOUNTER FOR MONITORING AMIODARONE THERAPY: Status: ACTIVE | Noted: 2017-06-28

## 2017-06-28 NOTE — ASSESSMENT & PLAN NOTE
Need to find out if he needs / tolerates anticoagulation as he has a questionable history of a fib

## 2017-06-28 NOTE — ASSESSMENT & PLAN NOTE
Monthly admission since Jan 2017 with elevated crt / t ani   Suspect he may need inotropes eventually will get RHC as he is currently not on anticoagulation  Consider advanced CHF pathway if admitted after RHC

## 2017-06-28 NOTE — PROGRESS NOTES
"Per Dr Leos's request, confirmed with pt's wife that pt does NOT take any anticoagulation medications (specifically wife denies pt taking coumadin, warfarin, eliquis).  Per wife pt took "those shots in his stomach but that was only once when he was in the hospital but they didn't send him home on any".     Dr Leos aware.   "

## 2017-06-28 NOTE — PROCEDURES
Suman Hayden is a 67 y.o.  male patient, who presents for a 6 minute walk test ordered by MD. Deric.  The diagnosis is Congestive Heart Failure.  The patient's BMI is 32.2 kg/m2.  Predicted distance (lower limit of normal) is 341.38 meters.      Test Results:    The test was completed with stops.  The patient stopped 3 times for a total of 50 seconds.  The total time walked was 310 seconds.  During walking, the patient reported:  Dyspnea, Leg pain. The patient used no assistive devices  during testing.     06/27/2017---------Distance: 243.84 meters (800 feet)     O2 Sat % Supplemental Oxygen Heart Rate Blood Pressure Sayda Scale   Pre-exercise  (Resting) 100 % Room Air 60 bpm 104/71 mmHg 5-6   During Exercise 91 % Room Air 69 bpm 109/78 mmHg 4   Post-exercise  (Recovery) 99 % Room Air  63 bpm   mmHg       Recovery Time: 180 seconds    Performing nurse/tech: MILLIE Rice      PREVIOUS STUDY:   The patient has not had a previous study.      CLINICAL INTERPRETATION:  Six minute walk distance is 243.84 meters (800 feet) with somewhat heavy dyspnea.  During exercise, there was significant desaturation while breathing room air.  Both blood pressure and heart rate remained stable with walking.  The patient reported non-pulmonary symptoms during exercise.  Significant exercise impairment is likely due to desaturation and subjective symptoms.  The patient did complete the study, walking 310 seconds of the 360 second test.  No previous study performed.  Based upon age and body mass index, exercise capacity is less than predicted.

## 2017-07-03 ENCOUNTER — SURGERY (OUTPATIENT)
Age: 67
End: 2017-07-03

## 2017-07-03 ENCOUNTER — HOSPITAL ENCOUNTER (INPATIENT)
Facility: HOSPITAL | Age: 67
LOS: 6 days | Discharge: HOME HEALTH | DRG: 286 | End: 2017-07-09
Attending: INTERNAL MEDICINE | Admitting: INTERNAL MEDICINE
Payer: MEDICARE

## 2017-07-03 DIAGNOSIS — I11.0 HYPERTENSIVE HEART DISEASE WITH HEART FAILURE: ICD-10-CM

## 2017-07-03 DIAGNOSIS — I50.23 ACUTE ON CHRONIC SYSTOLIC HEART FAILURE, NYHA CLASS 4: ICD-10-CM

## 2017-07-03 DIAGNOSIS — R76.8 HEPATITIS B CORE ANTIBODY POSITIVE: ICD-10-CM

## 2017-07-03 DIAGNOSIS — I50.23 ACUTE ON CHRONIC SYSTOLIC HEART FAILURE: ICD-10-CM

## 2017-07-03 DIAGNOSIS — I10 HTN (HYPERTENSION): ICD-10-CM

## 2017-07-03 DIAGNOSIS — R97.20 ELEVATED PSA: ICD-10-CM

## 2017-07-03 DIAGNOSIS — I42.8 NONISCHEMIC CARDIOMYOPATHY: Primary | ICD-10-CM

## 2017-07-03 DIAGNOSIS — Z76.82 HEART TRANSPLANT CANDIDATE: ICD-10-CM

## 2017-07-03 DIAGNOSIS — I50.9 HEART FAILURE: ICD-10-CM

## 2017-07-03 DIAGNOSIS — Z95.810 PRESENCE OF AUTOMATIC IMPLANTABLE CARDIOVERTER-DEFIBRILLATOR: ICD-10-CM

## 2017-07-03 DIAGNOSIS — I47.20 V-TACH: ICD-10-CM

## 2017-07-03 DIAGNOSIS — E78.5 HYPERLIPIDEMIA: ICD-10-CM

## 2017-07-03 DIAGNOSIS — I50.43 ACUTE ON CHRONIC COMBINED SYSTOLIC AND DIASTOLIC HEART FAILURE: ICD-10-CM

## 2017-07-03 DIAGNOSIS — I50.9 ACUTE ON CHRONIC HEART FAILURE: ICD-10-CM

## 2017-07-03 LAB
ABO + RH BLD: NORMAL
ALBUMIN SERPL BCP-MCNC: 3.4 G/DL
ALP SERPL-CCNC: 52 U/L
ALT SERPL W/O P-5'-P-CCNC: 13 U/L
ANION GAP SERPL CALC-SCNC: 13 MMOL/L
AST SERPL-CCNC: 20 U/L
BASOPHILS # BLD AUTO: 0.02 K/UL
BASOPHILS NFR BLD: 0.4 %
BILIRUB SERPL-MCNC: 3.1 MG/DL
BLD GP AB SCN CELLS X3 SERPL QL: NORMAL
BUN SERPL-MCNC: 22 MG/DL
CALCIUM SERPL-MCNC: 9.5 MG/DL
CHLORIDE SERPL-SCNC: 101 MMOL/L
CO2 SERPL-SCNC: 22 MMOL/L
CREAT SERPL-MCNC: 1.6 MG/DL
DIASTOLIC DYSFUNCTION: YES
DIFFERENTIAL METHOD: ABNORMAL
EOSINOPHIL # BLD AUTO: 0.1 K/UL
EOSINOPHIL NFR BLD: 2.3 %
ERYTHROCYTE [DISTWIDTH] IN BLOOD BY AUTOMATED COUNT: 18 %
EST. GFR  (AFRICAN AMERICAN): 50.8 ML/MIN/1.73 M^2
EST. GFR  (NON AFRICAN AMERICAN): 43.9 ML/MIN/1.73 M^2
ESTIMATED PA SYSTOLIC PRESSURE: 62.06
GLUCOSE SERPL-MCNC: 89 MG/DL
HCT VFR BLD AUTO: 43.1 %
HGB BLD-MCNC: 14.4 G/DL
LYMPHOCYTES # BLD AUTO: 1.6 K/UL
LYMPHOCYTES NFR BLD: 34.2 %
MAGNESIUM SERPL-MCNC: 2 MG/DL
MCH RBC QN AUTO: 28.5 PG
MCHC RBC AUTO-ENTMCNC: 33.4 %
MCV RBC AUTO: 85 FL
MITRAL VALVE REGURGITATION: ABNORMAL
MONOCYTES # BLD AUTO: 0.7 K/UL
MONOCYTES NFR BLD: 15.5 %
NEUTROPHILS # BLD AUTO: 2.3 K/UL
NEUTROPHILS NFR BLD: 47.6 %
PHOSPHATE SERPL-MCNC: 3.3 MG/DL
PLATELET # BLD AUTO: 130 K/UL
PMV BLD AUTO: ABNORMAL FL
POTASSIUM SERPL-SCNC: 4.9 MMOL/L
PROT SERPL-MCNC: 6.8 G/DL
RBC # BLD AUTO: 5.05 M/UL
RETIRED EF AND QEF - SEE NOTES: 10 (ref 55–65)
SODIUM SERPL-SCNC: 136 MMOL/L
TRICUSPID VALVE REGURGITATION: ABNORMAL
WBC # BLD AUTO: 4.77 K/UL

## 2017-07-03 PROCEDURE — 86850 RBC ANTIBODY SCREEN: CPT | Mod: NTX

## 2017-07-03 PROCEDURE — 86900 BLOOD TYPING SEROLOGIC ABO: CPT | Mod: NTX

## 2017-07-03 PROCEDURE — 25000003 PHARM REV CODE 250: Mod: NTX | Performed by: PHYSICIAN ASSISTANT

## 2017-07-03 PROCEDURE — B2011ZZ PLAIN RADIOGRAPHY OF MULTIPLE CORONARY ARTERIES USING LOW OSMOLAR CONTRAST: ICD-10-PCS | Performed by: INTERNAL MEDICINE

## 2017-07-03 PROCEDURE — 20600001 HC STEP DOWN PRIVATE ROOM: Mod: NTX

## 2017-07-03 PROCEDURE — 63600175 PHARM REV CODE 636 W HCPCS: Mod: NTX | Performed by: PHYSICIAN ASSISTANT

## 2017-07-03 PROCEDURE — 93005 ELECTROCARDIOGRAM TRACING: CPT | Mod: NTX

## 2017-07-03 PROCEDURE — 93284 PRGRMG EVAL IMPLANTABLE DFB: CPT

## 2017-07-03 PROCEDURE — 80053 COMPREHEN METABOLIC PANEL: CPT | Mod: NTX

## 2017-07-03 PROCEDURE — 85025 COMPLETE CBC W/AUTO DIFF WBC: CPT | Mod: NTX

## 2017-07-03 PROCEDURE — 84100 ASSAY OF PHOSPHORUS: CPT | Mod: NTX

## 2017-07-03 PROCEDURE — 93306 TTE W/DOPPLER COMPLETE: CPT | Mod: PBBFAC,NTX | Performed by: INTERNAL MEDICINE

## 2017-07-03 PROCEDURE — 93010 ELECTROCARDIOGRAM REPORT: CPT | Mod: NTX,,, | Performed by: INTERNAL MEDICINE

## 2017-07-03 PROCEDURE — 83735 ASSAY OF MAGNESIUM: CPT | Mod: NTX

## 2017-07-03 PROCEDURE — 4A023N6 MEASUREMENT OF CARDIAC SAMPLING AND PRESSURE, RIGHT HEART, PERCUTANEOUS APPROACH: ICD-10-PCS | Performed by: INTERNAL MEDICINE

## 2017-07-03 PROCEDURE — C8929 TTE W OR WO FOL WCON,DOPPLER: HCPCS | Mod: NTX

## 2017-07-03 RX ORDER — SODIUM CHLORIDE 0.9 % (FLUSH) 0.9 %
3 SYRINGE (ML) INJECTION EVERY 8 HOURS
Status: DISCONTINUED | OUTPATIENT
Start: 2017-07-03 | End: 2017-07-09 | Stop reason: HOSPADM

## 2017-07-03 RX ORDER — POTASSIUM CHLORIDE 20 MEQ/1
20 TABLET, EXTENDED RELEASE ORAL 2 TIMES DAILY
Status: DISCONTINUED | OUTPATIENT
Start: 2017-07-03 | End: 2017-07-03

## 2017-07-03 RX ORDER — NAPROXEN SODIUM 220 MG/1
81 TABLET, FILM COATED ORAL DAILY
Status: DISCONTINUED | OUTPATIENT
Start: 2017-07-03 | End: 2017-07-09 | Stop reason: HOSPADM

## 2017-07-03 RX ORDER — AMIODARONE HYDROCHLORIDE 200 MG/1
200 TABLET ORAL DAILY
Status: DISCONTINUED | OUTPATIENT
Start: 2017-07-03 | End: 2017-07-04

## 2017-07-03 RX ORDER — HEPARIN SODIUM 5000 [USP'U]/ML
5000 INJECTION, SOLUTION INTRAVENOUS; SUBCUTANEOUS EVERY 8 HOURS
Status: DISCONTINUED | OUTPATIENT
Start: 2017-07-03 | End: 2017-07-09 | Stop reason: HOSPADM

## 2017-07-03 RX ORDER — FUROSEMIDE 20 MG/1
20 TABLET ORAL 2 TIMES DAILY
Status: DISCONTINUED | OUTPATIENT
Start: 2017-07-03 | End: 2017-07-03

## 2017-07-03 RX ORDER — DOBUTAMINE HYDROCHLORIDE 400 MG/100ML
5 INJECTION INTRAVENOUS CONTINUOUS
Status: DISCONTINUED | OUTPATIENT
Start: 2017-07-03 | End: 2017-07-09 | Stop reason: HOSPADM

## 2017-07-03 RX ORDER — FUROSEMIDE 10 MG/ML
60 INJECTION INTRAMUSCULAR; INTRAVENOUS ONCE
Status: COMPLETED | OUTPATIENT
Start: 2017-07-03 | End: 2017-07-03

## 2017-07-03 RX ORDER — PRAVASTATIN SODIUM 20 MG/1
20 TABLET ORAL NIGHTLY
Status: DISCONTINUED | OUTPATIENT
Start: 2017-07-03 | End: 2017-07-09 | Stop reason: HOSPADM

## 2017-07-03 RX ADMIN — FUROSEMIDE 10 MG/HR: 10 INJECTION, SOLUTION INTRAMUSCULAR; INTRAVENOUS at 05:07

## 2017-07-03 RX ADMIN — PRAVASTATIN SODIUM 20 MG: 20 TABLET ORAL at 08:07

## 2017-07-03 RX ADMIN — DOBUTAMINE HYDROCHLORIDE 2.5 MCG/KG/MIN: 400 INJECTION INTRAVENOUS at 04:07

## 2017-07-03 RX ADMIN — Medication 3 ML: at 08:07

## 2017-07-03 RX ADMIN — FUROSEMIDE 60 MG: 10 INJECTION, SOLUTION INTRAMUSCULAR; INTRAVENOUS at 04:07

## 2017-07-03 RX ADMIN — SACUBITRIL AND VALSARTAN 1 TABLET: 24; 26 TABLET, FILM COATED ORAL at 08:07

## 2017-07-03 RX ADMIN — HEPARIN SODIUM 5000 UNITS: 5000 INJECTION, SOLUTION INTRAVENOUS; SUBCUTANEOUS at 01:07

## 2017-07-03 RX ADMIN — Medication 3 ML: at 01:07

## 2017-07-03 RX ADMIN — HEPARIN SODIUM 5000 UNITS: 5000 INJECTION, SOLUTION INTRAVENOUS; SUBCUTANEOUS at 08:07

## 2017-07-03 NOTE — PROGRESS NOTES
Pt arrived to echo lab without iv access. 20 g sl started in left forearm.  Taped and secured and left in place as pt has had multiple attempts at iv starts by other nurses.  definity given ivp via sl for imaging. Denies transfusion rxn. Tolerated well. Sl flushed after.  Report given to camilo  Charge nurse on floor.

## 2017-07-03 NOTE — PLAN OF CARE
Problem: Patient Care Overview  Goal: Plan of Care Review  Outcome: Ongoing (interventions implemented as appropriate)  Pt remains free from falls and injury. Plan of care reviewed with pt. Pt understands plan. Pt denies pain, VSS. Pt here from LVAD workup in order to have LVAD placement this week. Started on dobutamine drip. Will continue to monitor.

## 2017-07-03 NOTE — INTERVAL H&P NOTE
The patient has been examined and the H&P has been reviewed:    I concur with the findings and no changes have occurred since H&P was written.    Acute on Chronic Heart Failure, NYHA class IV  - Patient had RHC today with elevated Wedge and RA pressure, low CO/CI.  - He will be admitted for diuresis and initiation of inotropes   -  2.5   - Lasix 60 IV push, 10 hr ggt  - Step 1 of the pathway was started today.  - EKG, Echo pending    Active Hospital Problems    Diagnosis  POA    *Acute on chronic systolic heart failure, NYHA class 4 [I50.23]  Yes     Priority: 1 - High    Hypertensive heart disease with heart failure [I11.0]  Yes    Nonischemic cardiomyopathy [I42.8]  Yes     Per Dr Pearl records    LVEDD 6.2 / LVEF 25 May 2017        Resolved Hospital Problems    Diagnosis Date Resolved POA   No resolved problems to display.

## 2017-07-03 NOTE — NURSING
Pt iv access not started due to poor venous access. Pt stuck four times unsuccessfully. Renato at the bedside. PICC team ordered for placement due to LVAD workup. Okayed to delay start of Lasix and Dobutamine due to no IV per Renato

## 2017-07-04 PROBLEM — I11.0 HYPERTENSIVE HEART DISEASE WITH HEART FAILURE: Status: RESOLVED | Noted: 2017-06-27 | Resolved: 2017-07-04

## 2017-07-04 PROBLEM — I50.23 ACUTE ON CHRONIC SYSTOLIC HEART FAILURE: Status: RESOLVED | Noted: 2017-07-03 | Resolved: 2017-07-04

## 2017-07-04 PROBLEM — I50.43 ACUTE ON CHRONIC COMBINED SYSTOLIC AND DIASTOLIC HEART FAILURE: Status: ACTIVE | Noted: 2017-07-04

## 2017-07-04 LAB
ANION GAP SERPL CALC-SCNC: 12 MMOL/L
BUN SERPL-MCNC: 20 MG/DL
CALCIUM SERPL-MCNC: 9 MG/DL
CHLORIDE SERPL-SCNC: 102 MMOL/L
CO2 SERPL-SCNC: 22 MMOL/L
CREAT SERPL-MCNC: 1.4 MG/DL
EST. GFR  (AFRICAN AMERICAN): 59.7 ML/MIN/1.73 M^2
EST. GFR  (NON AFRICAN AMERICAN): 51.6 ML/MIN/1.73 M^2
GLUCOSE SERPL-MCNC: 86 MG/DL
POTASSIUM SERPL-SCNC: 3.8 MMOL/L
SODIUM SERPL-SCNC: 136 MMOL/L

## 2017-07-04 PROCEDURE — 97165 OT EVAL LOW COMPLEX 30 MIN: CPT | Mod: NTX

## 2017-07-04 PROCEDURE — 25000003 PHARM REV CODE 250: Mod: NTX | Performed by: NURSE PRACTITIONER

## 2017-07-04 PROCEDURE — 63600175 PHARM REV CODE 636 W HCPCS: Mod: NTX | Performed by: PHYSICIAN ASSISTANT

## 2017-07-04 PROCEDURE — 80048 BASIC METABOLIC PNL TOTAL CA: CPT | Mod: NTX

## 2017-07-04 PROCEDURE — 36415 COLL VENOUS BLD VENIPUNCTURE: CPT | Mod: NTX

## 2017-07-04 PROCEDURE — 25000003 PHARM REV CODE 250: Mod: NTX | Performed by: PHYSICIAN ASSISTANT

## 2017-07-04 PROCEDURE — 36569 INSJ PICC 5 YR+ W/O IMAGING: CPT | Mod: NTX

## 2017-07-04 PROCEDURE — C1751 CATH, INF, PER/CENT/MIDLINE: HCPCS | Mod: NTX

## 2017-07-04 PROCEDURE — 76937 US GUIDE VASCULAR ACCESS: CPT | Mod: NTX

## 2017-07-04 PROCEDURE — 20600001 HC STEP DOWN PRIVATE ROOM: Mod: NTX

## 2017-07-04 PROCEDURE — 99232 SBSQ HOSP IP/OBS MODERATE 35: CPT | Mod: TXP,,, | Performed by: INTERNAL MEDICINE

## 2017-07-04 RX ORDER — SPIRONOLACTONE 25 MG/1
25 TABLET ORAL DAILY
Status: DISCONTINUED | OUTPATIENT
Start: 2017-07-04 | End: 2017-07-09 | Stop reason: HOSPADM

## 2017-07-04 RX ADMIN — ASPIRIN 81 MG CHEWABLE TABLET 81 MG: 81 TABLET CHEWABLE at 09:07

## 2017-07-04 RX ADMIN — Medication 3 ML: at 09:07

## 2017-07-04 RX ADMIN — HEPARIN SODIUM 5000 UNITS: 5000 INJECTION, SOLUTION INTRAVENOUS; SUBCUTANEOUS at 09:07

## 2017-07-04 RX ADMIN — HEPARIN SODIUM 5000 UNITS: 5000 INJECTION, SOLUTION INTRAVENOUS; SUBCUTANEOUS at 01:07

## 2017-07-04 RX ADMIN — AMIODARONE HYDROCHLORIDE 200 MG: 200 TABLET ORAL at 09:07

## 2017-07-04 RX ADMIN — SPIRONOLACTONE 25 MG: 25 TABLET, FILM COATED ORAL at 09:07

## 2017-07-04 RX ADMIN — Medication 3 ML: at 04:07

## 2017-07-04 RX ADMIN — SACUBITRIL AND VALSARTAN 1 TABLET: 24; 26 TABLET, FILM COATED ORAL at 09:07

## 2017-07-04 RX ADMIN — Medication 3 ML: at 03:07

## 2017-07-04 RX ADMIN — HEPARIN SODIUM 5000 UNITS: 5000 INJECTION, SOLUTION INTRAVENOUS; SUBCUTANEOUS at 04:07

## 2017-07-04 RX ADMIN — PRAVASTATIN SODIUM 20 MG: 20 TABLET ORAL at 09:07

## 2017-07-04 RX ADMIN — FUROSEMIDE 10 MG/HR: 10 INJECTION, SOLUTION INTRAMUSCULAR; INTRAVENOUS at 05:07

## 2017-07-04 NOTE — PT/OT/SLP EVAL
Occupational Therapy  Evaluation    Suman Hayden   MRN: [id number]   Admitting Diagnosis: Acute on chronic combined systolic and diastolic heart failure    OT Date of Treatment: 07/04/17   OT Start Time: 1010  OT Stop Time: 1020  OT Total Time (min): 10 min    Billable Minutes:  Evaluation 10    Diagnosis: Acute on chronic combined systolic and diastolic heart failure       Past Medical History:   Diagnosis Date    Cardiomyopathy     Hyperlipidemia     Hypertension     Obesity     S/P implantation of automatic cardioverter/defibrillator (AICD)     Ventricular tachycardia       Past Surgical History:   Procedure Laterality Date    CARDIAC CATHETERIZATION      CARDIAC DEFIBRILLATOR PLACEMENT         Referring physician: Dr. Leos  Date referred to OT: 7/3/2017    General Precautions: Standard,       Do you have any cultural, spiritual, Church conflicts, given your current situation?: none     Patient History:  Living Environment  Lives With: spouse (lives with wife of 45 years who is also retired an can provide assist 24 hrs if needed. They live in a H with no BYRON, tub shower with no bench or grab bars. PTA pt is fully independent with self care, driving and IADLs. 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  Pt agreeable to therapy.  Chief Complaint: No complaints.  Patient/Family stated goals: Pt is interested in preparing for surgery and being as strong as possible  going in.     Pain/Comfort  Pain Rating 1: 0/10    Objective:  Patient found with: peripheral IV    Cognitive Exam:  Oriented to: Person and Place  Follows Commands/attention: Follows one-step commands  Communication: clear/fluent  Memory:  No Deficits noted  Safety awareness/insight to disability: intact  Coping skills/emotional control: Appropriate to situation    Visual/perceptual:  Intact    Physical Exam:  Postural examination/scapula alignment: No postural abnormalities identified  Skin integrity: Visible skin intact  Edema: None noted     Sensation:   Intact B UE    Upper Extremity Range of Motion:  Right Upper Extremity: WFL  Left Upper Extremity: WFL    Upper Extremity Strength:  Right Upper Extremity: WFL  Left Upper Extremity: WFL   Strength: WFL    Fine motor coordination:   Intact    Gross motor coordination: WFL    Functional Mobility:  Bed Mobility:  Rolling/Turning to Left: Independent  Rolling/Turning Right: Independent  Scooting/Bridging: Independent  Supine to Sit: Independent  Sit to Supine: Independent    Transfers:  Sit <> Stand Assistance: Independent  Sit <> Stand Assistive Device: No Assistive Device  Bed <> Chair Transfer Assistance: Independent  Bed <> Chair Assistive Device: No Assistive Device  Toilet Transfer Technique: Other (see comments) (ambulates)  Toilet Transfer Assistance: Independent    Functional Ambulation: independent in room, goes for walks with wife in hallway per RN. Pt not able to make full lap before needing to sit down. Ambulation deferred secondary to pt just returned to room from walk in [place] and was fatigued.    Activities of Daily Living:  Feeding Level of Assistance: Independent  UE Dressing Level of Assistance: Independent  LE Dressing Level of Assistance: Independent  Grooming Position: Standing  Grooming Level of Assistance: Independent  Toileting Where Assessed: Toilet  Toileting Level of Assistance: Independent    Balance:   Static Sit: NORMAL: No  "deviations seen in posture held statically  Dynamic Sit: NORMAL: No deviations seen in posture held dynamically  Static Stand: NORMAL: No deviations seen in posture held statically  Dynamic stand: NORMAL: No deviations seen in posture held dynamically      AM-PAC 6 CLICK ADL  How much help from another person does this patient currently need?  1 = Unable, Total/Dependent Assistance  2 = A lot, Maximum/Moderate Assistance  3 = A little, Minimum/Contact Guard/Supervision  4 = None, Modified [place]/Independent    Putting on and taking off regular lower body clothing? : 4  Bathing (including washing, rinsing, drying)?: 4  Toileting, which includes using toilet, bedpan, or urinal? : 4  Putting on and taking off regular upper body clothing?: 4  Taking care of personal grooming such as brushing teeth?: 4  Eating meals?: 4  Total Score: 24    AM-PAC Raw Score CMS "G-Code Modifier Level of Impairment Assistance   6 % Total / Unable   7 - 9 CM 80 - 100% Maximal Assist   10-14 CL 60 - 80% Moderate Assist   15 - 19 CK 40 - 60% Moderate Assist   20 - 22 CJ 20 - 40% Minimal Assist   23 CI 1-20% SBA / CGA   24 CH 0% Independent/ Mod I       Patient left supine with all lines intact, call button in reach and wife present    Assessment:  Suman Hayden is a 67 y.o. male with a medical diagnosis of Acute on chronic combined systolic and diastolic heart failure and presents with good level of independence with basic self care tasks. Pt has decreased standing tolerance and gets SOB with activity. Pt demonstrates good fine motor coordination, sensation and ability to learn new information in regard to VAD management. Mr. Hayden has good family support.  He would benefit from continued OT services in order to improve functional activity tolerance and standing tolerance for ADLs.     Rehab identified problem list/impairments: Rehab identified problem list/impairments: weakness, impaired sensation, impaired self care skills, " impaired balance, decreased upper extremity function, decreased lower extremity function, impaired functional mobilty    Rehab potential is good.    Activity tolerance: Good    Discharge recommendations: Discharge Facility/Level Of Care Needs: other (see comments) (TBD after surgery)     Barriers to discharge: Barriers to Discharge: None    Equipment recommendations: other (see comments) (TBD at a later date)     GOALS:    Occupational Therapy Goals        Problem: Occupational Therapy Goal    Goal Priority Disciplines Outcome Interventions   Occupational Therapy Goal     OT, PT/OT     Description:  Goals to be met by: 7/18/2017    Patient will increase functional independence with ADLs by performing:    Pt will tolerate static standing during activity of choice for 15 minutes.   Pt will tolerate dynamic standing activity for 20 minutes with vital signs stable.   Pt will shower with mod I prior to surgery if permitted by nursing staff.                       PLAN:  Patient to be seen 2 x/week (until after surgery) to address the above listed problems via self-care/home management, therapeutic activities, therapeutic exercises, neuromuscular re-education  Plan of Care expires:  8/4/2017  Plan of Care reviewed with: patient, spouse      DELMY Bradford  07/04/2017

## 2017-07-04 NOTE — CONSULTS
Midline placed in left brachial vein, [id number] catheter length. Lot #[id number]. Used real time ultrasound. Image recorded and saved.

## 2017-07-04 NOTE — PROGRESS NOTES
Dobutamine increased to 5 mcg/kg/min .    For detailed progress note, please see the note of Kishore Paz.

## 2017-07-04 NOTE — PLAN OF CARE
Problem: Patient Care Overview  Goal: Plan of Care Review  Outcome: Ongoing (interventions implemented as appropriate)      Present on Admission:   Acute on chronic systolic heart failure, NYHA class 4   Nonischemic cardiomyopathy   Hypertensive heart disease with heart failure    Pt being w/u for advanced options for HF, pathway initiated. CT of abd. and pelvis completed, echo and RHC done as well.  gtt infusing cont. Monitoring CV status closely. Furosemide gtt infusing cont. Strict I and O's reinforced with pt and staff. No acute events throughout the night. Reviewed plan of care with pt and family; all questions/concerns addressed. VS and assessment per flow sheet, patient progressing towards goals as tolerated. Will continue to monitor.          MELVIN Flynn, RN  Cardiology Stepdown

## 2017-07-04 NOTE — PLAN OF CARE
Problem: Patient Care Overview  Goal: Plan of Care Review  Outcome: Ongoing (interventions implemented as appropriate)  Pt remains free from falls and injury. Plan of care reviewed with pt. Pt understands plan. Pt denies pain, VSS. Pt seen walking down [place]. Dobutamine drip increased to 5mcg. Lasix drip stopped due to infiltrated IV. PICC team called and PICC ordered. Will restart lasix when IV access regained. Will continue to monitor.

## 2017-07-04 NOTE — SUBJECTIVE & OBJECTIVE
Interval History: No complaints overnight.     Continuous Infusions:   DOBUTamine 2.5 mcg/kg/min (07/03/17 1604)    furosemide (LASIX) 1 mg/mL infusion (non-titrating) 10 mg/hr (07/03/17 1731)     Scheduled Meds:   amiodarone  200 mg Oral Daily    aspirin  81 mg Oral Daily    heparin (porcine)  5,000 Units Subcutaneous Q8H    pravastatin  20 mg Oral QHS    sacubitril-valsartan  1 tablet Oral BID    sodium chloride 0.9%  3 mL Intravenous Q8H    spironolactone  25 mg Oral Daily     PRN Meds:    Review of patient's allergies indicates:  No Known Allergies  Objective:     Vital Signs (Most Recent):  Temp: 97.5 °F (36.4 °C) (07/04/17 0554)  Pulse: 60 (07/04/17 0700)  Resp: 16 (07/04/17 0554)  BP: 116/83 (07/04/17 0554)  SpO2: 99 % (07/04/17 0554) Vital Signs (24h Range):  Temp:  [96.7 °F (35.9 °C)-98.1 °F (36.7 °C)] 97.5 °F (36.4 °C)  Pulse:  [59-76] 60  Resp:  [16-18] 16  SpO2:  [93 %-99 %] 99 %  BP: (100-116)/(69-83) 116/83     Weight: 90.2 kg (198 lb 13.7 oz)  Body mass index is 30.24 kg/m².      Intake/Output Summary (Last 24 hours) at 07/04/17 0816  Last data filed at 07/04/17 0600   Gross per 24 hour   Intake           700.03 ml   Output             2425 ml   Net         -1724.97 ml       Hemodynamic Parameters:         Physical Exam   Constitutional: He is oriented to person, place, and time. He appears well-developed and well-nourished.   HENT:   Head: Normocephalic.   Neck: JVD present.   Cardiovascular: Normal rate and regular rhythm.    Murmur heard.  Pulmonary/Chest: Effort normal and breath sounds normal.   Abdominal: Soft. Bowel sounds are normal. He exhibits no mass.   Musculoskeletal: Normal range of motion. He exhibits edema.   Neurological: He is alert and oriented to person, place, and time. He exhibits normal muscle tone.   Psychiatric: He has a normal mood and affect.   Nursing note and vitals reviewed.      Significant Labs:  CBC:    Recent Labs  Lab 07/03/17  1343   WBC 4.77   RBC 5.05    HGB 14.4   HCT 43.1   *   MCV 85   MCH 28.5   MCHC 33.4     BNP:  No results for input(s): BNP in the last 72 hours.    Invalid input(s): BNPCHRISTOIACHRISTIANA  CMP:    Recent Labs  Lab 07/03/17  1343 07/04/17  0547   GLU 89 86   CALCIUM 9.5 9.0   ALBUMIN 3.4*  --    PROT 6.8  --     136   K 4.9 3.8   CO2 22* 22*    102   BUN 22 20   CREATININE 1.6* 1.4   ALKPHOS 52*  --    ALT 13  --    AST 20  --    BILITOT 3.1*  --       Coagulation:     Recent Labs  Lab 07/03/17  0750   INR 1.5*     LDH:  No results for input(s): LDH in the last 72 hours.  Microbiology:  Microbiology Results (last 7 days)     ** No results found for the last 168 hours. **          I have reviewed all pertinent labs within the past 24 hours.    Estimated Creatinine Clearance: 55.8 mL/min (based on Cr of 1.4).

## 2017-07-04 NOTE — PLAN OF CARE
Problem: Occupational Therapy Goal  Goal: Occupational Therapy Goal  Goals to be met by: 7/18/2017    Patient will increase functional independence with ADLs by performing:    Pt will tolerate static standing during activity of choice for 15 minutes.   Pt will tolerate dynamic standing activity for 20 minutes with vital signs stable.   Pt will shower with mod I prior to surgery if permitted by nursing staff.     Evaluation completed.  OT plan of care developed and reviewed with patient.

## 2017-07-04 NOTE — PROGRESS NOTES
[facility]  Heart Transplant  Progress Note    Patient Name: Suman Hayden  MRN: [id number]  Admission Date: 7/3/2017  Hospital Length of Stay: 1 days  Attending Physician: Ortega Leos MD  Primary Care Provider: Joe Ernst MD  Principal Problem:Acute on chronic combined systolic and diastolic heart failure    Subjective:     Interval History: No complaints overnight.     Continuous Infusions:   DOBUTamine 2.5 mcg/kg/min (07/03/17 1604)    furosemide (LASIX) 1 mg/mL infusion (non-titrating) 10 mg/hr (07/03/17 1731)     Scheduled Meds:   amiodarone  200 mg Oral Daily    aspirin  81 mg Oral Daily    heparin (porcine)  5,000 Units Subcutaneous Q8H    pravastatin  20 mg Oral QHS    sacubitril-valsartan  1 tablet Oral BID    sodium chloride 0.9%  3 mL Intravenous Q8H    spironolactone  25 mg Oral Daily     PRN Meds:    Review of patient's allergies indicates:  No Known Allergies  Objective:     Vital Signs (Most Recent):  Temp: 97.5 °F (36.4 °C) (07/04/17 0554)  Pulse: 60 (07/04/17 0700)  Resp: 16 (07/04/17 0554)  BP: 116/83 (07/04/17 0554)  SpO2: 99 % (07/04/17 0554) Vital Signs (24h Range):  Temp:  [96.7 °F (35.9 °C)-98.1 °F (36.7 °C)] 97.5 °F (36.4 °C)  Pulse:  [59-76] 60  Resp:  [16-18] 16  SpO2:  [93 %-99 %] 99 %  BP: (100-116)/(69-83) 116/83     Weight: 90.2 kg (198 lb 13.7 oz)  Body mass index is 30.24 kg/m².      Intake/Output Summary (Last 24 hours) at 07/04/17 0816  Last data filed at 07/04/17 0600   Gross per 24 hour   Intake           700.03 ml   Output             2425 ml   Net         -1724.97 ml       Hemodynamic Parameters:         Physical Exam   Constitutional: He is oriented to person, place, and time. He appears well-developed and well-nourished.   HENT:   Head: Normocephalic.   Neck: JVD present.   Cardiovascular: Normal rate and regular rhythm.    Murmur heard.  Pulmonary/Chest: Effort normal and breath sounds normal.   Abdominal: Soft. Bowel sounds are normal. He  exhibits no mass.   Musculoskeletal: Normal range of motion. He exhibits edema.   Neurological: He is alert and oriented to person, place, and time. He exhibits normal muscle tone.   Psychiatric: He has a normal mood and affect.   Nursing note and vitals reviewed.      Significant Labs:  CBC:    Recent Labs  Lab 07/03/17  1343   WBC 4.77   RBC 5.05   HGB 14.4   HCT 43.1   *   MCV 85   MCH 28.5   MCHC 33.4     BNP:  No results for input(s): BNP in the last 72 hours.    Invalid input(s): BNPTRIAGELBLO  CMP:    Recent Labs  Lab 07/03/17  1343 07/04/17  0547   GLU 89 86   CALCIUM 9.5 9.0   ALBUMIN 3.4*  --    PROT 6.8  --     136   K 4.9 3.8   CO2 22* 22*    102   BUN 22 20   CREATININE 1.6* 1.4   ALKPHOS 52*  --    ALT 13  --    AST 20  --    BILITOT 3.1*  --       Coagulation:     Recent Labs  Lab 07/03/17  0750   INR 1.5*     LDH:  No results for input(s): LDH in the last 72 hours.  Microbiology:  Microbiology Results (last 7 days)     ** No results found for the last 168 hours. **          I have reviewed all pertinent labs within the past 24 hours.    Estimated Creatinine Clearance: 55.8 mL/min (based on Cr of 1.4).      Assessment and Plan:       * Acute on chronic combined systolic and diastolic heart failure    --Continue Lasix gtt. Diuresing well.  --RHC per chart.  --Continue  2.5mcg/kg/mn.  --Continue entresto. Start spironolactone.  --Continue Pathway. Currently step 1.             Kishore Paz NP  Heart Transplant  [facility]

## 2017-07-04 NOTE — ASSESSMENT & PLAN NOTE
--Continue Lasix gtt. Diuresing well.  --RHC per chart.  --Continue  2.5mcg/kg/mn.  --Continue entresto. Start spironolactone.  --Continue Pathway. Currently step 1.

## 2017-07-05 ENCOUNTER — EDUCATION (OUTPATIENT)
Dept: TRANSPLANT | Facility: CLINIC | Age: 67
End: 2017-07-05

## 2017-07-05 ENCOUNTER — SOCIAL WORK (OUTPATIENT)
Dept: TRANSPLANT | Facility: CLINIC | Age: 67
End: 2017-07-05

## 2017-07-05 ENCOUNTER — TELEPHONE (OUTPATIENT)
Dept: TRANSPLANT | Facility: CLINIC | Age: 67
End: 2017-07-05

## 2017-07-05 PROBLEM — I10 HTN (HYPERTENSION): Status: ACTIVE | Noted: 2017-07-05

## 2017-07-05 PROBLEM — I47.20 V-TACH: Status: ACTIVE | Noted: 2017-07-05

## 2017-07-05 PROBLEM — E78.5 HYPERLIPIDEMIA: Status: ACTIVE | Noted: 2017-07-05

## 2017-07-05 LAB
ALBUMIN SERPL BCP-MCNC: 3.3 G/DL
ALP SERPL-CCNC: 48 U/L
ALT SERPL W/O P-5'-P-CCNC: 15 U/L
ANION GAP SERPL CALC-SCNC: 11 MMOL/L
AST SERPL-CCNC: 19 U/L
BILIRUB DIRECT SERPL-MCNC: 1.6 MG/DL
BILIRUB SERPL-MCNC: 3 MG/DL
BUN SERPL-MCNC: 15 MG/DL
CALCIUM SERPL-MCNC: 9.3 MG/DL
CHLORIDE SERPL-SCNC: 98 MMOL/L
CO2 SERPL-SCNC: 29 MMOL/L
CREAT SERPL-MCNC: 1.4 MG/DL
EST. GFR  (AFRICAN AMERICAN): 59.7 ML/MIN/1.73 M^2
EST. GFR  (NON AFRICAN AMERICAN): 51.6 ML/MIN/1.73 M^2
GLUCOSE SERPL-MCNC: 70 MG/DL
INR PPP: 1.3
POTASSIUM SERPL-SCNC: 3.4 MMOL/L
PROT SERPL-MCNC: 6.7 G/DL
PROTHROMBIN TIME: 13 SEC
SODIUM SERPL-SCNC: 138 MMOL/L

## 2017-07-05 PROCEDURE — 25000003 PHARM REV CODE 250: Mod: NTX | Performed by: PHYSICIAN ASSISTANT

## 2017-07-05 PROCEDURE — 36415 COLL VENOUS BLD VENIPUNCTURE: CPT | Mod: NTX

## 2017-07-05 PROCEDURE — 63600175 PHARM REV CODE 636 W HCPCS: Mod: NTX | Performed by: PHYSICIAN ASSISTANT

## 2017-07-05 PROCEDURE — 97530 THERAPEUTIC ACTIVITIES: CPT | Mod: NTX

## 2017-07-05 PROCEDURE — 99223 1ST HOSP IP/OBS HIGH 75: CPT | Mod: NTX,,, | Performed by: NURSE PRACTITIONER

## 2017-07-05 PROCEDURE — 97161 PT EVAL LOW COMPLEX 20 MIN: CPT | Mod: NTX

## 2017-07-05 PROCEDURE — 20600001 HC STEP DOWN PRIVATE ROOM: Mod: NTX

## 2017-07-05 PROCEDURE — 25000003 PHARM REV CODE 250: Mod: NTX | Performed by: NURSE PRACTITIONER

## 2017-07-05 PROCEDURE — 80076 HEPATIC FUNCTION PANEL: CPT | Mod: NTX

## 2017-07-05 PROCEDURE — 99232 SBSQ HOSP IP/OBS MODERATE 35: CPT | Mod: TXP,,, | Performed by: INTERNAL MEDICINE

## 2017-07-05 PROCEDURE — 85610 PROTHROMBIN TIME: CPT | Mod: NTX

## 2017-07-05 PROCEDURE — 63600175 PHARM REV CODE 636 W HCPCS: Mod: NTX | Performed by: INTERNAL MEDICINE

## 2017-07-05 PROCEDURE — 80048 BASIC METABOLIC PNL TOTAL CA: CPT | Mod: NTX

## 2017-07-05 RX ORDER — POTASSIUM CHLORIDE 20 MEQ/1
40 TABLET, EXTENDED RELEASE ORAL ONCE
Status: COMPLETED | OUTPATIENT
Start: 2017-07-05 | End: 2017-07-05

## 2017-07-05 RX ADMIN — POTASSIUM CHLORIDE 40 MEQ: 1500 TABLET, EXTENDED RELEASE ORAL at 08:07

## 2017-07-05 RX ADMIN — PRAVASTATIN SODIUM 20 MG: 20 TABLET ORAL at 08:07

## 2017-07-05 RX ADMIN — HEPARIN SODIUM 5000 UNITS: 5000 INJECTION, SOLUTION INTRAVENOUS; SUBCUTANEOUS at 02:07

## 2017-07-05 RX ADMIN — Medication 3 ML: at 05:07

## 2017-07-05 RX ADMIN — HEPARIN SODIUM 5000 UNITS: 5000 INJECTION, SOLUTION INTRAVENOUS; SUBCUTANEOUS at 05:07

## 2017-07-05 RX ADMIN — DOBUTAMINE HYDROCHLORIDE 5 MCG/KG/MIN: 400 INJECTION INTRAVENOUS at 08:07

## 2017-07-05 RX ADMIN — SACUBITRIL AND VALSARTAN 1 TABLET: 24; 26 TABLET, FILM COATED ORAL at 08:07

## 2017-07-05 RX ADMIN — HEPARIN SODIUM 5000 UNITS: 5000 INJECTION, SOLUTION INTRAVENOUS; SUBCUTANEOUS at 08:07

## 2017-07-05 RX ADMIN — FUROSEMIDE 5 MG/HR: 10 INJECTION, SOLUTION INTRAMUSCULAR; INTRAVENOUS at 09:07

## 2017-07-05 RX ADMIN — Medication 3 ML: at 10:07

## 2017-07-05 RX ADMIN — ASPIRIN 81 MG CHEWABLE TABLET 81 MG: 81 TABLET CHEWABLE at 08:07

## 2017-07-05 RX ADMIN — SPIRONOLACTONE 25 MG: 25 TABLET, FILM COATED ORAL at 08:07

## 2017-07-05 RX ADMIN — Medication 3 ML: at 03:07

## 2017-07-05 NOTE — LETTER
2017    RE:  Suman Hayden           50          MRN   [id number]             To Whom It May Concern:    Mr. Suman Hayden is a 67 y.o. year-old male patient at [facility] and was seen for consideration for cardiac transplantation.  He has a diagnosis of Non-Ischemic Cardiomyopathy.  He is impaired in his exercise tolerance with a NYHA Functional Class III-- 60% - Requires occasional assistance but is able to care for needs, and an ejection fraction of 10%. He is currently hospitalized with a continuous dobutamine infusion.     We feel that his functional impairment and lack of advanced options make his quality of life poor.  We feel that his only option at this time, after thorough review of all of his findings, is cardiac transplantation.  Therefore, we request you review of this case and approval for an inpatient cardiac transplant evaluation.    If we can be of any further assistance, please do not hesitate to contact us at the above address.    Sincerely,        Ortega Leos MD, [facility]  Medical Director, Pre-Transplant Program

## 2017-07-05 NOTE — PT/OT/SLP EVAL
Physical Therapy  Evaluation    Suman Hayden   MRN: [id number]   Admitting Diagnosis: Acute on chronic combined systolic and diastolic heart failure    PT Received On: 07/05/17  PT Start Time: 1243     PT Stop Time: 1312    PT Total Time (min): 29 min       Billable Minutes:  Evaluation 20 and Therapeutic Activity 9    Diagnosis: Acute on chronic combined systolic and diastolic heart failure  Potential OHT/LVAD    Past Medical History:   Diagnosis Date    Cardiomyopathy     Hyperlipidemia     Hypertension     Obesity     S/P implantation of automatic cardioverter/defibrillator (AICD)     Ventricular tachycardia       Past Surgical History:   Procedure Laterality Date    CARDIAC CATHETERIZATION      CARDIAC DEFIBRILLATOR PLACEMENT         Referring physician: MELISSA Chen  Date referred to PT: 7/3/17    General Precautions: Standard, fall  Orthopedic Precautions: N/A   Braces: N/A       Do you have any cultural, spiritual, Mandaen conflicts, given your current situation?: none stated    Patient History:  Lives With: spouse  Living Arrangements: house  Home Layout: Able to live on 1st floor  Living Environment Comment: Pt lives with wife in a 1 story house, no steps. Prior to admission, pt Independent with mobility, drives. Wife able to assist upon D/C.  Equipment Currently Used at Home: none  DME owned (not currently used): none    Previous Level of Function:  Ambulation Skills: independent  Transfer Skills: independent  ADL Skills: independent  Work/Leisure Activity: independent    Subjective:  Communicated with RN prior to session.  Pt willing to participate with PT. Wife present.   Chief Complaint: decreased endurance  Patient goals: get LVAD    Pain/Comfort  Pain Rating 1: 0/10      Objective:   Patient found with: peripheral IV, telemetry     Cognitive Exam:  Oriented to: Person, Place, Time and Situation    Follows Commands/attention: Follows multistep  commands  Communication: clear/fluent  Safety  awareness/insight to disability: intact    Physical Exam:  Postural examination/scapula alignment: Rounded shoulder    Skin integrity: Visible skin intact      Sensation:   Intact    Lower Extremity Range of Motion:  Right Lower Extremity: WFL  Left Lower Extremity: WFL    Lower Extremity Strength:  Right Lower Extremity: WFL  Left Lower Extremity: WFL       Gross motor coordination: WFL    Functional Mobility:  Bed Mobility:  Supine to Sit: Supervision  Sit to Supine:  (Not tested -pt up in chair)    Transfers:  Sit <> Stand Assistance: Contact Guard Assistance  Sit <> Stand Assistive Device: No Assistive Device    Gait:   Gait Distance: ~400 ft  Assistance 1: Stand by Assistance  Gait Assistive Device: No device (One UE support on IV pole)  Gait Pattern: reciprocal  Gait Deviation(s): decreased hal, decreased velocity of limb motion, decreased step length (No rest breaks required)      Balance:   Static Sit: GOOD: Takes MODERATE challenges from all directions  Dynamic Sit: GOOD: Maintains balance through MODERATE excursions of active trunk movement  Static Stand: FAIR+: Takes MINIMAL challenges from all directions  Dynamic stand: FAIR+: Needs CLOSE SUPERVISION during gait and is able to right self with minor LOB    Therapeutic Activities and Exercises:  Pt educated on potential sternal precautions post-operatively.   Performed sit <> stand x 4 trials with fwd/bkwd seated scooting without UEs in preparation for sternal precautions.  Standing marching x 10 reps.     AM-PAC 6 CLICK MOBILITY  How much help from another person does this patient currently need?   1 = Unable, Total/Dependent Assistance  2 = A lot, Maximum/Moderate Assistance  3 = A little, Minimum/Contact Guard/Supervision  4 = None, Modified [place]/Independent    Turning over in bed (including adjusting bedclothes, sheets and blankets)?: 4  Sitting down on and standing up from a chair with arms (e.g., wheelchair, bedside commode, etc.):  3  Moving from lying on back to sitting on the side of the bed?: 3  Moving to and from a bed to a chair (including a wheelchair)?: 3  Need to walk in hospital room?: 3  Climbing 3-5 steps with a railing?: 3  Total Score: 19     AM-PAC Raw Score CMS G-Code Modifier Level of Impairment Assistance   6 % Total / Unable   7 - 9 CM 80 - 100% Maximal Assist   10 - 14 CL 60 - 80% Moderate Assist   15 - 19 CK 40 - 60% Moderate Assist   20 - 22 CJ 20 - 40% Minimal Assist   23 CI 1-20% SBA / CGA   24 CH 0% Independent/ Mod I     Patient left up in chair with all lines intact, call button in reach, RN notified and wife present.    Assessment:   Suman Hayden is a 67 y.o. male with a medical diagnosis of Acute on chronic combined systolic and diastolic heart failure and presents for OHT/LVAD work-up. Pt motivated for participation with PT. Pt would benefit from PT services to improve strength, endurance, balance and gait with reinforcement of pending sternal precautions with mobility. PT POC 3x/wk. Pt encouraged to continue ambulating in hallways with family/nursing.     Rehab identified problem list/impairments: Rehab identified problem list/impairments: weakness, impaired endurance, impaired functional mobilty, gait instability, impaired balance, impaired cardiopulmonary response to activity    Rehab potential is good.    Activity tolerance: Fair+    Discharge recommendations: Discharge Facility/Level Of Care Needs:  (TBD, pending hospital course)     Barriers to discharge: Barriers to Discharge: None    Equipment recommendations: Equipment Needed After Discharge:  (TBD)     GOALS:    Physical Therapy Goals        Problem: Physical Therapy Goal    Goal Priority Disciplines Outcome Goal Variances Interventions   Physical Therapy Goal     PT/OT, PT Ongoing (interventions implemented as appropriate)     Description:  Goals to be met by: 7/15/17     Patient will increase functional independence with mobility by  performin. Supine to sit with [place]  2. Sit to supine with [place]  3. Sit to stand transfer with [place] with no UE utilization  4. Gait  x >400 feet with [place] using no UE support.   5. Lower extremity exercise program x15 reps, with independence                      PLAN:    Patient to be seen 3 x/week to address the above listed problems via gait training, therapeutic activities, therapeutic exercises  Plan of Care expires: 17  Plan of Care reviewed with: patient, spouse          Regine Maldonado, PT  2017

## 2017-07-05 NOTE — CONSULTS
[facility]  Cardiothoracic Surgery  Consult Note    Patient Name: Suman Hayden  MRN: [id number]  Admission Date: 7/3/2017  Attending Physician: Ortega Leos MD  Referring Provider: Ortega Leos MD    Inpatient consult to Cardiothoracic Surgery  Consult performed by: JOSHUA MAC  Consult ordered by: ORTEGA LEOS  Reason for consult: OHT/LVAD        Subjective:     History of Present Illness: Mr. Hayden is a 67 y.o. year old male who has presents to be considered for advanced surgical options (LVAD/OHT).  CHF since 2006 who has had progressive decline since Jan 2017 as he has required monthly admission.  Feels marginally better since starting 24/256 entresto in early June 2017.  Still has PEREIRA after one block, early satiety, moderate edema, and three pillow orthopnea.    No current facility-administered medications on file prior to encounter.      Current Outpatient Prescriptions on File Prior to Encounter   Medication Sig    amiodarone (PACERONE) 200 MG Tab Take by mouth once daily.    aspirin 81 MG Chew Take 81 mg by mouth once daily.    carvedilol (COREG) 12.5 MG tablet Take 12.5 mg by mouth 2 (two) times daily.    furosemide (LASIX) 40 MG tablet Take 40 mg by mouth 2 (two) times daily.    potassium chloride SA (K-DUR,KLOR-CON) 20 MEQ tablet Take 20 mEq by mouth 2 (two) times daily.    pravastatin (PRAVACHOL) 20 MG tablet Take 20 mg by mouth every evening.    sacubitril-valsartan (ENTRESTO) 24-26 mg per tablet Take 1 tablet by mouth 2 (two) times daily.    duloxetine (CYMBALTA) 60 MG capsule Take 60 mg by mouth once daily.    furosemide (LASIX) 20 MG tablet Take 20 mg by mouth 2 (two) times daily.       Review of patient's allergies indicates:  No Known Allergies    Past Medical History:   Diagnosis Date    Cardiomyopathy     Hyperlipidemia     Hypertension     Obesity     S/P implantation of automatic cardioverter/defibrillator (AICD)     Ventricular tachycardia       Past Surgical History:   Procedure Laterality Date    CARDIAC CATHETERIZATION      CARDIAC DEFIBRILLATOR PLACEMENT       Family History     None        Social History Main Topics    Smoking status: Never Smoker    Smokeless tobacco: Never Used    Alcohol use No    Drug use: Unknown    Sexual activity: Not on file     Review of Systems   Constitutional: Positive for fatigue. Negative for activity change, appetite change and fever.   HENT: Negative for congestion, sneezing and sore throat.    Eyes: Negative for pain, discharge and visual disturbance.   Respiratory: Positive for shortness of breath. Negative for cough and wheezing.    Cardiovascular: Positive for leg swelling. Negative for chest pain and palpitations.   Gastrointestinal: Negative for abdominal distention, abdominal pain, constipation, diarrhea, nausea and vomiting.   Endocrine: Negative for polydipsia, polyphagia and polyuria.   Genitourinary: Negative for dysuria, frequency and urgency.   Musculoskeletal: Negative for back pain and gait problem.   Skin: Negative for rash and wound.   Neurological: Negative for dizziness, seizures, syncope and headaches.   Hematological: Does not bruise/bleed easily.   Psychiatric/Behavioral: Negative for agitation and confusion. The patient is not nervous/anxious.      Objective:     Vital Signs (Most Recent):  Temp: 98.7 °F (37.1 °C) (07/05/17 1200)  Pulse: 61 (07/05/17 1200)  Resp: 16 (07/05/17 1200)  BP: 92/60 (07/05/17 1200)  SpO2: 95 % (07/05/17 1200) Vital Signs (24h Range):  Temp:  [98.2 °F (36.8 °C)-98.7 °F (37.1 °C)] 98.7 °F (37.1 °C)  Pulse:  [58-61] 61  Resp:  [16-18] 16  SpO2:  [95 %-98 %] 95 %  BP: ()/(58-71) 92/60     Weight: 85.6 kg (188 lb 11.4 oz)  Body mass index is 28.69 kg/m².    SpO2: 95 %  O2 Device (Oxygen Therapy): room air     Intake/Output - Last 3 Shifts       07/03 0700 - 07/04 0659 07/04 0700 - 07/05 0659 07/05 0700 - 07/06 0659    P.O. 540 810     I.V. (mL/kg) 160 (1.8)  400.1 (4.7)     Total Intake(mL/kg) 700 (7.8) 1210.1 (14.1)     Urine (mL/kg/hr) 2425 5150 (2.5) 2800 (5.4)    Stool 0 0 (0) 0 (0)    Total Output 2425 5150 2800    Net -1725 -3939.9 -2800           Stool Occurrence 0 x 0 x 1 x           Lines/Drains/Airways     Peripheral Intravenous Line                 Peripheral IV - Single Lumen 07/03/17 1747 Left Forearm 1 day         Midline Catheter Insertion/Assessment  - Single Lumen 07/04/17 1410 Left brachial vein 18g x 8cm less than 1 day         Peripheral IV - Single Lumen 07/04/17 1345 Left Forearm less than 1 day                Physical Exam   Constitutional: He is oriented to person, place, and time. He appears well-developed.   HENT:   Head: Normocephalic and atraumatic.   Eyes: Pupils are equal, round, and reactive to light.   Neck: Normal range of motion. Neck supple. JVD present.   Cardiovascular: Normal rate, regular rhythm and normal heart sounds.    Pulmonary/Chest: Effort normal. He has rales.   Abdominal: Soft. Bowel sounds are normal.   Musculoskeletal: Normal range of motion.   Neurological: He is alert and oriented to person, place, and time.   Skin: Skin is warm and dry.   Psychiatric: He has a normal mood and affect. His behavior is normal.       Significant Labs:  BMP:   Recent Labs  Lab 07/03/17  1343  07/05/17  0520   GLU 89  < > 70     < > 138   K 4.9  < > 3.4*     < > 98   CO2 22*  < > 29   BUN 22  < > 15   CREATININE 1.6*  < > 1.4   CALCIUM 9.5  < > 9.3   MG 2.0  --   --    < > = values in this interval not displayed.  CBC:   Recent Labs  Lab 07/03/17  1343   WBC 4.77   RBC 5.05   HGB 14.4   HCT 43.1   *   MCV 85   MCH 28.5   MCHC 33.4       Significant Diagnostics:  2D ECHO 7/3/17  CONCLUSIONS     1 - Severely depressed left ventricular systolic function (EF 10-15%).     2 - Biatrial enlargement.     3 - Restrictive LV filling pattern, indicating markedly elevated LAP (grade 3 diastolic dysfunction).     4 - Right ventricular  enlargement with moderately to severely depressed systolic function.     5 - Pulmonary hypertension. The estimated PA systolic pressure is 62 mmHg.     6 - Mild mitral regurgitation.     7 - Mild tricuspid regurgitation.     8 - Increased central venous pressure.   LVEDD 6.7    RHC 7/3/17  --pending results       Assessment/Plan:     Active Diagnoses:    Diagnosis Date Noted POA    PRINCIPAL PROBLEM:  Acute on chronic combined systolic and diastolic heart failure [I50.43] 07/04/2017 Yes    HTN (hypertension) [I10] 07/05/2017 Unknown    Hyperlipidemia [E78.5] 07/05/2017 Unknown    V-tach [I47.2] 07/05/2017 Unknown    Nonischemic cardiomyopathy [I42.8] 06/25/2017 Yes      Problems Resolved During this Admission:    Diagnosis Date Noted Date Resolved POA    Acute on chronic systolic heart failure [I50.23] 07/03/2017 07/04/2017 Yes    Hypertensive heart disease with heart failure [I11.0] 06/27/2017 07/04/2017 Yes     Mr. Hayden is a 67 y.o. year old male who has a PMHx of acute on chronic systolic and diastolic heart failure, reserved EF, vtach, and multiple heart failure related hospital admissions who presents as a consult for advanced surgical options evaluation.     PLAN:  Thank you for this consult. Dr. Downing will staff this patient. The CT does not preclude this patient from surgical advanced options. Will present pt in selection. Please proceed with LVAD/OHT work up.        Sharlene Francois NP  Cardiothoracic Surgery  [facility]

## 2017-07-05 NOTE — PROGRESS NOTES
EDUCATION NOTE:    Suman Hayden was seen today for pre-heart transplant education.  Patient signed wellness contract and informed consent to undergo heart transplant evaluation work-up.  Thorough pre-transplant education conducted.      Information presented included:  · Evaluation process  · Members of the transplant team  · Selection committee members and role of the committee  · Listing process for transplant  · Different listing designations, including status 7  · 1-year graft survival statistics  · LVAD as bridge to transplant or DT  · Need to reach patient within 15 minutes of donor offer  · CDC high risk donors  · Blood transfusions  · Process for matching donor with recipient  · Need for weight loss and how it relates to the wait time  · Post-transplant immunosuppression for life with need to be able to afford post-transplant medications  · Need for a caregiver to be with them at all times beginning with discharge from ICU, through at least the first 6 weeks post-transplant  · Need to find local housing for the first 6 weeks post-transplant  · How to reach team members at any time  · [employer] website with written instructions regarding how to look up information specific to [facility]    Patient was accompanied by wife.  Patient asked pertinent questions, which were answered to their satisfaction.  Patient was also given a copy of the wellness contract and informed consent to undergo evaluation work-up.    Pt reports he had a colonoscopy in the last year with Dr Justus Serrato. Records requested.

## 2017-07-05 NOTE — PLAN OF CARE
Problem: Physical Therapy Goal  Goal: Physical Therapy Goal  Goals to be met by: 7/15/17     Patient will increase functional independence with mobility by performin. Supine to sit with [place]  2. Sit to supine with [place]  3. Sit to stand transfer with [place] with no UE utilization  4. Gait  x >400 feet with [place] using no UE support.   5. Lower extremity exercise program x15 reps, with independence    Outcome: Ongoing (interventions implemented as appropriate)  PT eval completed. Goals initiated. Will continue to progress as tolerated.

## 2017-07-05 NOTE — SUBJECTIVE & OBJECTIVE
Interval History: Patient reports he feels better today.  Felt like he had more energy yesterday afternoon.  Feels his fluid status is close to baseline      Continuous Infusions:   DOBUTamine 5 mcg/kg/min (07/05/17 0841)    furosemide (LASIX) 1 mg/mL infusion (non-titrating) 5 mg/hr (07/05/17 0850)     Scheduled Meds:   aspirin  81 mg Oral Daily    heparin (porcine)  5,000 Units Subcutaneous Q8H    pravastatin  20 mg Oral QHS    sacubitril-valsartan  1 tablet Oral BID    sodium chloride 0.9%  3 mL Intravenous Q8H    spironolactone  25 mg Oral Daily     PRN Meds:    Review of patient's allergies indicates:  No Known Allergies  Objective:     Vital Signs (Most Recent):  Temp: 98.6 °F (37 °C) (07/05/17 0800)  Pulse: 60 (07/05/17 0800)  Resp: 16 (07/05/17 0800)  BP: (!) 96/58 (07/05/17 0800)  SpO2: 98 % (07/05/17 0800) Vital Signs (24h Range):  Temp:  [97.2 °F (36.2 °C)-98.7 °F (37.1 °C)] 98.6 °F (37 °C)  Pulse:  [59-61] 60  Resp:  [16-18] 16  SpO2:  [93 %-98 %] 98 %  BP: ()/(58-71) 96/58     Weight: 85.6 kg (188 lb 11.4 oz)  Body mass index is 28.69 kg/m².      Intake/Output Summary (Last 24 hours) at 07/05/17 1055  Last data filed at 07/05/17 0700   Gross per 24 hour   Intake          1030.09 ml   Output             6850 ml   Net         -5819.91 ml       Telemetry: No ectopy     Physical Exam   Constitutional: He is oriented to person, place, and time. He appears well-developed and well-nourished.   Neck: Normal range of motion. Neck supple. JVD present.   Just above the clavicle    Cardiovascular: Normal rate and regular rhythm.  Exam reveals no gallop and no friction rub.    No murmur heard.  Intermittent S3   Pulmonary/Chest: Effort normal and breath sounds normal. He has no wheezes. He has no rales.   Abdominal: Soft. Bowel sounds are normal. There is no tenderness.   Musculoskeletal: He exhibits edema.   Trace non pitting lower extremity edema    Neurological: He is alert and oriented to person,  place, and time.   Skin: Skin is warm and dry.       Significant Labs:  CBC:    Recent Labs  Lab 07/03/17  1343   WBC 4.77   RBC 5.05   HGB 14.4   HCT 43.1   *   MCV 85   MCH 28.5   MCHC 33.4     BNP:  No results for input(s): BNP in the last 72 hours.    Invalid input(s): BNPTRIAGELBLO  CMP:    Recent Labs  Lab 07/05/17  0520   GLU 70   CALCIUM 9.3   ALBUMIN 3.3*   PROT 6.7      K 3.4*   CO2 29   CL 98   BUN 15   CREATININE 1.4   ALKPHOS 48*   ALT 15   AST 19   BILITOT 3.0*      Coagulation:     Recent Labs  Lab 07/05/17  0520   INR 1.3*     LDH:  No results for input(s): LDH in the last 72 hours.  Microbiology:  Microbiology Results (last 7 days)     ** No results found for the last 168 hours. **          I have reviewed all pertinent labs within the past 24 hours.    Estimated Creatinine Clearance: 54.5 mL/min (based on Cr of 1.4).    Diagnostic Results: reports reviewed  CT head, abdomen and pelvis: 7/3/17  2D echo with CFD: 7/3/17  RHC: 7/3/17

## 2017-07-05 NOTE — PROGRESS NOTES
Left Ventricular Assist Device (LVAD) and Transplant Recipient Adult Psychosocial Assessment    Suman Hayden  [address]  [place] [address]    Telephone Information:   Mobile [phone]   Home  [phone] (home)  Work  There is no work phone number on file.  E-mail  [email or web address]    Sex: male  YOB: 1950  Age: 67 y.o.    Encounter Date: 7/5/2017  [place] Citizen: yes  Primary Language: English   Needed: no    Emergency Contact:  Name: Kia Hayden  Relationship: wife  Address: same as pt  Phone Numbers:  [phone] (mobile)    Family/Social Support:   Number of dependents/: Pt denies.  Marital history: Pt and wife report being  for 45 years. Pt and wife report no previous marriages.   Other family dynamics: Pt reports having a large supportive family. Pt reports wife is primary caregiver. Pt reports 2 children: son Boni Hayden age 35 who lives with pt and wife and dtr Rayna age 44 who lives in [place]. Pt reports both children are involved in pt's care and are supportive of pt. Pt reports his sister Yumiko is a retired nurse, and is also very supportive, and Yumiko's 2 adult sons are willing to help pt as well. Pt reports also very involved in his [employer], and has a great support system through the [employer].     Household Composition:  Name: Kia Hayden  Age: 63  Relationship: wife  Does person drive? yes    Name: Boni Hayden  Age: 35  Relationship: son  Does person drive? yes    Do you and your caregivers have access to reliable transportation? yes  PRIMARY CAREGIVER: Kia Hayden will be primary caregiver, phone number [phone].      provided in-depth information to Patient and Caregiver regarding  regarding pre- and post-LVAD and pre- and post-transplant caregiver role.   strongly encourages Patient and Caregiver to have concrete plan regarding post-transplant care giving, including back-up caregiver(s) to ensure  care giving needs are met as needed.    Caregiver states understanding all aspects of caregiver role/commitment and is able/willing/committed to being caregiver to the fullest extent necessary. .      Patient and Caregiver verbalizes understanding of the education provided today and caregiver responsibilities.       remains available. Patient and Caregiver agree to contact  in a timely manner if concerns arise.      Able to take time off work without financial concerns: yes. Pt's wife was working as a PCA until January 2017, when she quit her job in order to care for pt.      Additional Significant Others who will Assist with LVAD/Transplant:  Name: Yumiko Will  Age: 71  City: [place] State: [place]  Relationship: sister  Does person drive? no   Pt's sister stopped driving approximately 6 months ago, and her sons assist with transportation.    Living Will: no  Healthcare Power of : no  Advance Directives on file: <<no information> per medical record.  Verbally reviewed LW/HCPA information.   provided patient with copy of LW/HCPA documents and provided education on completion of forms    Living Donors: N/A    Highest Education Level: High School (9-12) or GED  Reading Ability: 12th grade. Pt reports some difficulty with reading. Pt's wife assists pt with reading. Pt and wife report no difficulty with memory or comprehension.   Reports difficulty with: reading and writing  Learns Best By:  Visual/ hands on     Status: no  VA Benefits: no     Working for Income: No  If no, reason not working: Patient Choice - Retired   Pt retired after working as a  for the [employer] for 35 years.     Spouse/Significant Other Employment: Pt's wife is not currently working.     Disabled: no    Monthly Income:  residential: $1650  Able to afford all costs now and if transplanted or receives LVAD, including medications: yes  Pt reports secure power  source? yes  Pt reports ability to afford monthly electric bill? yes  Pt reports ability to afford LVAD dressing supplies? yes  Patient and Caregiver verbalizes understanding of personal responsibilities related to LVAD and transplant costs and the importance of having a financial plan to ensure that patients LVAD and transplant costs are fully covered.       provided fundraising information/education.  Patient and Caregiver verbalizes understanding.   remains available.    Insurance:   Payor/Plan Subscr  Sex Relation Sub. Ins. ID Effective Group Num   1. MEDICARE - ME* MIRTA LOPEZ 1950 Male  [id number] 6/1/15                                    [address]   2. BLUE CROSS BL* MIRTA LOPEZ 1950 Male  [id number] 1/1/10 [id number]                                   [address] [address]     Primary Insurance (for UNOS reporting): Public Insurance - Medicare FFS (Fee For Service)  Secondary Insurance (for UNOS reporting): Private Insurance  Patient and Caregiver verbalizes clear understanding that patient may experience difficulty obtaining and/or be denied insurance coverage post-surgery. This includes and is not limited to disability insurance, life insurance, health insurance, burial insurance, long term care insurance, and other insurances.      Patient and Caregiver also reports understanding that future health concerns related to or unrelated to LVAD or transplantation may not be covered by patient's insurance.  Resources and information provided and reviewed.    Patient and Caregiver provides verbal permission to release any necessary information to outside resources for patient care and discharge planning.  Resources and information provided are reviewed.       Infusion Service: patient utilizing? no. At this time the plan is for pt to be D/C'ed on IV . Pt reports no preference of infusion companies and is agreeable to [facility] ([phone]). Verbal referral made to [facility]  [facility].   Home Health: patient utilizing? no. Pt requesting [facility] ([phone] ph, [phone] fax[facility]. SW spoke to [facility] with [facility] who confirmed receipt of the referral.  DME: no  Pulmonary/Cardiac Rehab: no  ADLS:  Pt reports independent in ADLs and still driving.     Adherence: Pt reports a high level of adherence with health care regimen.  Adherence education and counseling provided.     Per History Section:Past Medical History:   Diagnosis Date    Cardiomyopathy     Hyperlipidemia     Hypertension     Obesity     S/P implantation of automatic cardioverter/defibrillator (AICD)     Ventricular tachycardia      Social History   Substance Use Topics    Smoking status: Never Smoker    Smokeless tobacco: Never Used    Alcohol use No     History   Drug use: Unknown     History   Sexual Activity    Sexual activity: Not on file       Per Today's Psychosocial:  Tobacco: none, patient denies any use.  Alcohol: none, patient denies any use.  Illicit Drugs/Non-prescribed Medications: none, patient denies any use.    Patient and Caregiver state clear understanding of the potential impact of substance use as it relates to LVAD and transplant candidacy and is aware of possible random substance screening.  Substance abstinence/cessation counseling, education and resources provided and reviewed.     Arrests/DWI/Treatment/Rehab: patient denies    Psychiatric History:    Mental Health: Pt denies any mental health concerns  Psychiatrist/Counselor: Pt denies.  Medications:  Pt denies.  Suicide/Homicide Issues: Pt denies.  Safety at home: Pt reports safe at home.     Knowledge: Patient and Caregiver state having clear understanding and realistic expectations regarding the potential risks and potential benefits LVAD implantation and organ transplantation and organ donation and agrees to discuss with health care team members and  support system members, as well as to utilize available resources and express questions and/or concerns in order to further facilitate the pt informed decision-making.  Resources and information provided and reviewed.    Patient and Caregiver are aware of [facility]'s affiliation and/or partnership with agencies in home health care, LTAC, SNF, [facility], and other hospitals and clinics.    Understanding: Patient and Caregiver reports having a clear understanding of the many lifetime commitments involved with being an LVAD and transplant recipient, including costs, compliance, medications, lab work, procedures, appointments, concrete and financial planning, preparedness, timely and appropriate communication of concerns, abstinence (ETOH, tobacco, illicit non-prescribed drugs), adherence to all health care team recommendations, support system and caregiver involvement, appropriate and timely resource utilization and follow-through, mental health counseling as needed/recommended, and patient and caregiver responsibilities.  Social Service Handbook, resources and detailed educational information provided and reviewed.  Educational information provided.    Patient and Caregiver also reports current and expected compliance with health care regime and states having a clear understanding of the importance of compliance.       Patient and Caregiver reports a clear understanding that risks and benefits may be involved with LVAD heart failure treatment and organ transplantation and with organ donation.     Patient and Caregiver also reports clear understanding that psychosocial risk factors may affect patient, and include but are not limited to feelings of depression, generalized anxiety, anxiety regarding dependence on others, post traumatic stress disorder, feelings of guilt and other emotional and/or mental concerns, and/or exacerbation of existing mental health concerns.  Detailed resources provided and discussed.      Patient and  Caregiver agrees to access appropriate resources in a timely manner as needed and/or as recommended, and to communicate concerns appropriately.     Patient and Caregiver also reports a clear understanding of treatment options available.      Feelings or Concerns: Pt reports no concerns at this time. Pt reports feeling comfortable with moving forward with surgery and reports trusting MDs to care for him.    Coping: Pt and wife report coping well at this time. Pt reports primary coping mechanisms of prayer and family support.     Goals: Pt reports goals of watching grandchildren grow up and being more involved in his [employer].     Interview Behavior: Patient and Caregiver presents as alert and oriented x 4, pleasant, good eye contact, calm, communicative, cooperative and asking and answering questions appropriately.          Transplant Social Work - Candidacy  Assessment/Plan:     Psychosocial Suitability: Patient presents as a suitable candidate for LVAD or heart transplant at this time. Based on psychosocial risk factors, patient presents as low risk, due to good family support, adequate insurance coverage, and effective coping skills.    Rosa Whitehead, NABEEL

## 2017-07-05 NOTE — PLAN OF CARE
Problem: Patient Care Overview  Goal: Plan of Care Review  Outcome: Ongoing (interventions implemented as appropriate)        Present on Admission:   Acute on chronic systolic heart failure, NYHA class 4   Nonischemic cardiomyopathy   Hypertensive heart disease with heart failure     Pt being w/u for advanced options for HF, pathway initiated. EF 10%.  gtt infusing cont. @ 5 mcg/min. Monitoring CV status closely. Furosemide gtt infusing cont. Strict I and O's reinforced with pt and staff. No acute events throughout the night. Reviewed plan of care with pt and family; all questions/concerns addressed. VS and assessment per flow sheet, patient progressing towards goals as tolerated. Will continue to monitor.           Chauncey Colvin, JAELN, RN  Cardiology Stepdown

## 2017-07-05 NOTE — ASSESSMENT & PLAN NOTE
-NICM; Last 2D Echo 07/3/17: LVEF 10-15%, LVEDD 6.7 cm  -Hypervolemic on examination today  -Current diuretic regimen: Furosemide gtt. Net negative 3.9L over the last 24 hours. Will transtion to gtt at lower dose of 5mg/hr and monitor response.   -GDMT with Spironolactone, Sacubitril-Valsartan  -Patient is currently being evaluated for OHTx/VAD  -Heart failure pathway Step 1; anticipate CTS will see patient today and we can move to step 2.   -2g Na dietary restriction, 1500 mL fluid restriction, strict I/Os

## 2017-07-05 NOTE — PROGRESS NOTES
[facility]  Heart Transplant  Progress Note    Patient Name: Suman Hayden  MRN: [id number]  Admission Date: 7/3/2017  Hospital Length of Stay: 2 days  Attending Physician: Ortega Leos MD  Primary Care Provider: Joe Ernst MD  Principal Problem:Acute on chronic combined systolic and diastolic heart failure    Subjective:     Interval History: Patient reports he feels better today.  Felt like he had more energy yesterday afternoon.  Feels his fluid status is close to baseline      Continuous Infusions:   DOBUTamine 5 mcg/kg/min (07/05/17 0841)    furosemide (LASIX) 1 mg/mL infusion (non-titrating) 5 mg/hr (07/05/17 0850)     Scheduled Meds:   aspirin  81 mg Oral Daily    heparin (porcine)  5,000 Units Subcutaneous Q8H    pravastatin  20 mg Oral QHS    sacubitril-valsartan  1 tablet Oral BID    sodium chloride 0.9%  3 mL Intravenous Q8H    spironolactone  25 mg Oral Daily     PRN Meds:    Review of patient's allergies indicates:  No Known Allergies  Objective:     Vital Signs (Most Recent):  Temp: 98.6 °F (37 °C) (07/05/17 0800)  Pulse: 60 (07/05/17 0800)  Resp: 16 (07/05/17 0800)  BP: (!) 96/58 (07/05/17 0800)  SpO2: 98 % (07/05/17 0800) Vital Signs (24h Range):  Temp:  [97.2 °F (36.2 °C)-98.7 °F (37.1 °C)] 98.6 °F (37 °C)  Pulse:  [59-61] 60  Resp:  [16-18] 16  SpO2:  [93 %-98 %] 98 %  BP: ()/(58-71) 96/58     Weight: 85.6 kg (188 lb 11.4 oz)  Body mass index is 28.69 kg/m².      Intake/Output Summary (Last 24 hours) at 07/05/17 1055  Last data filed at 07/05/17 0700   Gross per 24 hour   Intake          1030.09 ml   Output             6850 ml   Net         -5819.91 ml       Telemetry: No ectopy     Physical Exam   Constitutional: He is oriented to person, place, and time. He appears well-developed and well-nourished.   Neck: Normal range of motion. Neck supple. JVD present.   Just above the clavicle    Cardiovascular: Normal rate and regular rhythm.  Exam reveals no gallop  and no friction rub.    No murmur heard.  Intermittent S3   Pulmonary/Chest: Effort normal and breath sounds normal. He has no wheezes. He has no rales.   Abdominal: Soft. Bowel sounds are normal. There is no tenderness.   Musculoskeletal: He exhibits edema.   Trace non pitting lower extremity edema    Neurological: He is alert and oriented to person, place, and time.   Skin: Skin is warm and dry.       Significant Labs:  CBC:    Recent Labs  Lab 07/03/17  1343   WBC 4.77   RBC 5.05   HGB 14.4   HCT 43.1   *   MCV 85   MCH 28.5   MCHC 33.4     BNP:  No results for input(s): BNP in the last 72 hours.    Invalid input(s): BNPTRIAGELBLO  CMP:    Recent Labs  Lab 07/05/17  0520   GLU 70   CALCIUM 9.3   ALBUMIN 3.3*   PROT 6.7      K 3.4*   CO2 29   CL 98   BUN 15   CREATININE 1.4   ALKPHOS 48*   ALT 15   AST 19   BILITOT 3.0*      Coagulation:     Recent Labs  Lab 07/05/17  0520   INR 1.3*     LDH:  No results for input(s): LDH in the last 72 hours.  Microbiology:  Microbiology Results (last 7 days)     ** No results found for the last 168 hours. **          I have reviewed all pertinent labs within the past 24 hours.    Estimated Creatinine Clearance: 54.5 mL/min (based on Cr of 1.4).    Diagnostic Results: reports reviewed  CT head, abdomen and pelvis: 7/3/17  2D echo with CFD: 7/3/17  RHC: 7/3/17      Assessment and Plan:     68 yo male with reported hx NICM: Ef 10-15%, HTN, HLP, ICD admitted from procedure room for ADHF, inotrope's and advance options workup    * Acute on chronic combined systolic and diastolic heart failure    -NICM; Last 2D Echo 07/3/17: LVEF 10-15%, LVEDD 6.7 cm  -Hypervolemic on examination today  -Current diuretic regimen: Furosemide gtt. Net negative 3.9L over the last 24 hours. Will transtion to gtt at lower dose of 5mg/hr and monitor response.   -GDMT with Spironolactone, Sacubitril-Valsartan  -Patient is currently being evaluated for OHTx/VAD  -Heart failure pathway Step 1;  anticipate CTS will see patient today and we can move to step 2.   -2g Na dietary restriction, 1500 mL fluid restriction, strict I/Os          HTN (hypertension)    -Blood pressure controlled over the last 24 hours  -BP medications include Spironolactone and Entresto  -Will continue current regimen.          V-tach    -reported hx of V-tach.  -ICD in place  -Amiodarone stopped this admission; device interrogation pending        Hyperlipidemia    -will continue home dose of Pravastatin             MELISSA Jon  Heart Transplant  [facility]

## 2017-07-05 NOTE — ASSESSMENT & PLAN NOTE
-Blood pressure controlled over the last 24 hours  -BP medications include Spironolactone and Entresto  -Will continue current regimen.

## 2017-07-05 NOTE — ASSESSMENT & PLAN NOTE
-reported hx of V-tach.  -ICD in place  -Amiodarone stopped this admission; device interrogation pending

## 2017-07-05 NOTE — PLAN OF CARE
[facility]   Heart Transplant Clinic  [address] [place] [address]   [phone] after hours        HOME  HEALTH ORDERS      Admit to Home Health    Diagnosis:   Patient Active Problem List   Diagnosis    Nonischemic cardiomyopathy    CHF (NYHA class IV, ACC/AHA stage D)    Encounter for monitoring amiodarone therapy    Acute on chronic combined systolic and diastolic heart failure    HTN (hypertension)    Hyperlipidemia    V-tach       Patient is homebound due to:  Advance heart Failure and inotropic suppory    Diet: cardiac     Acitivities: as tolerated    Nursing:   SN to complete comprehensive assessment including routine vital signs. Instruct on disease process and s/s of complications to report to MD. Review/verify medication list sent home with the patient at time of discharge  and instruct patient/caregiver as needed. Frequency may be adjusted depending on start of care date.    Notify MD if SBP > 160 or < 90; DBP > 90 or < 50; HR > 120 or < 50; Temp > 101; Weight gain >3lbs in 1 day or 5lbs in 1 week.   Other:       LABS:  SN to perform labs: BMP, BNP, MG weekly    HOME INFUSION THERAPY:   SN to perform Infusion Therapy/Central Line Care.  Review Central Line Care & Central Line Flush with patient.    Administer (drug and dose): Dobutamine 5mcg/kg/min x 94.9 kg, intravenous, continuous     **For questions or concerns, please call [phone] and ask for Pre-Heart transplant clinic, M-F 8-5. After hours, weekends, call [phone] and ask for the Heart Transplant Cardiologist on call.**    Central line care:  Scrub the Hub: Prior to accessing the line, always perform a 30 second alcohol scrub  Each lumen of the central line is to be flushed at least daily with 10 mL Normal Saline and 3 mL Heparin flush (100 units/mL)  Skilled Nurse (SN) may draw blood from IV access  Blood Draw Procedure:   - Aspirate at least 5 mL of blood   - Discard   -  Obtain specimen   - Change posiflow cap   - Flush with 20 mL Normal Saline followed by a                 3-5 mL Heparin flush (100 units/mL)  Central :   - Sterile dressing changes are done weekly and as needed.   - Use chlor-hexadine scrub to cleanse site, apply Biopatch to insertion site,       apply securement device dressing   - Posi-flow caps are changed weekly and after EVERY lab draw.   - If sterile gauze is under dressing to control oozing,                 dressing change must be performed every 24 hours until gauze is not needed.      CONSULTS:     Aide to provide assistance with personal care, ADLs, and vital signs      Send initial Home Health orders to HTS attending physician on call.  Send follow up questions to [phone] or fax:              Pre Transplant:   [phone]

## 2017-07-05 NOTE — PROGRESS NOTES
Admit Note     Met with patient and spouse to assess needs. Patient is a 67 y.o.  male, admitted  for heart failure.  Pt has a PMH of CHF, pt has had multiple hospital admissions this year.      Patient admitted from home on 7/3/2017 .  At this time, patient presents as alert and oriented x 4, pleasant and good eye contact.  At this time, patients caregiver presents as alert and oriented x 4, pleasant, good eye contact and calm.    Household/Family Systems     Patient resides with patient's with spouse and adult son at     [address] [place] [address].      Support system includes spouse, adult children and extended family.     Patient does not have dependents that are need of being cared for.     Patients primary caregiver is Kia Hayden, patients spouse, phone number [phone].   Pt's home phone:  [phone]  Pt's cell phone:  [phone]  Emergency contacts:  Boni Hayden ( 35 yr old son, lives with parents and works at [employer]) [phone]  Rayna ( 44 yr old daughter,lives in [place] and works for the state) [phone]  Artie Hayden (nephew, lives in [place]) [phone]    During admission, patient's caregiver plans to stay in patient's room.  Confirmed patient and patients caregivers do have access to reliable transportation.    Cognitive Status/Learning     Patient reports reading ability as 12th grade and states patient does not have difficulty with seeing, hearing, comprehension, learning and memory. The pt's spouse reports the pt has issues with reading and writing.  Pt's spouse reports she assists pt with reading as necessary.  Patient reports patient learns best by one on one.   Needed: No.   Highest education level: High School (9-12) or GED    Vocation/Disability   .  Working for Income: No  If no, reason not working: Patient Choice - Retired    Patient is retired from [employer] in 2000.  The pt was a .    The pt's spouse  was working as a PCA until 2017.  Pt's spouse is not longer able to work due to needing to care for spouse.     Adherence     Patient reports a high level of adherence to patients health care regimen. Pt reports he is on a regular diet at home.   Adherence counseling and education provided. Patient verbalizes understanding.    Substance Use    Patient reports the following substance usage.    Tobacco: none, patient denies any use.  Alcohol: none, patient denies any use.  Illicit Drugs/Non-prescribed Medications: none, patient denies any use.  Patient states clear understanding of the potential impact of substance use.  Substance abstinence/cessation counseling, education and resources provided and reviewed.     Services Utilizing/ADLS    Infusion Service: Prior to admission, patient utilizing? no. Pt/family has no preference to IV company.  Pt's spouse reports she is open to going to IV company for dressing changes.    Home Health: Prior to admission, patient utilizing? no  DME: Prior to admission, no  Pulmonary/Cardiac Rehab: Prior to admission, no  Dialysis:  Prior to admission, no  Transplant Specialty Pharmacy:  Prior to admission, no.    Prior to admission, patient reports patient was independent with ADLS and was driving. Pt's spouse also drives.  No reported issues with driving to [facility].    Patient reports patient is not able to care for self at this time due to compromised medical condition (as documented in medical record) and physical weakness..  Patient indicates a willingness to care for self once medically cleared to do so.    Insurance/Medications    Insured by   Payor/Plan Subscr  Sex Relation Sub. Ins. ID Effective Group Num   1. MEDICARE - ME* MIRTA LOPEZ 1950 Male  [id number] 6/1/15                                    [address]   2. [employer] CROSS * MIRTA LOPEZ 1950 Male  [id number] 1/[birth year]/10 [id number]                                   [address] [address]      Primary Insurance  (for UNOS reporting): Public Insurance - Medicare FFS (Fee For Service)  Secondary Insurance (for UNOS reporting): Private Insurance    Patient reports patient is able to obtain and afford most medications at this time and at time of discharge. The pt's spouse reports there is one heart medication, Entresto that has a 100.00 co pay.  Pt's spouse reports the pt's doctor in [place], Dr. Pearl assists with samples.     Living Will/Healthcare Power of     Patient states patient does not have a LW and/or HCPA.   provided education regarding LW and HCPA and the completion of forms.    Coping/Mental Health    Patient is coping adequately with the aid of  family members, friends and kayli. Pt and his spouse are members of the [facility].  Patient denies mental health difficulties.     Discharge Planning    At time of discharge, patient plans to return to patient's home under the care of spouse.  Patients spouse will transport patient.  Per rounds today, expected discharge date has not been medically determined at this time. Patient and patients caregiver  verbalize understanding and are involved in treatment planning and discharge process.    Additional Concerns    Patient is being followed for needs, education, resources, information, emotional support, supportive counseling, and for supportive and skilled discharge plan of care.  providing ongoing psychosocial support, education, resources and d/c planning as needed.  SW remains available. Patient's caregiver verbalizes understanding and agreement with information reviewed,  availability and how to access available resources as needed. Patient verbalizes understanding and agreement with information reviewed, social work availability, and how to access available resources as needed.

## 2017-07-05 NOTE — NURSING
Patient and spouse ambulated 100 feet in [facility].  No complaints of pain or SOB reported.

## 2017-07-05 NOTE — PROGRESS NOTES
At the request of Dr. Leos, I have been asked to meet patient and provide VAD education. Introduced self and reason for visit. Pt and wife AAAO.  Provided written VAD education (red folder). Included in folder is the following: VAD education, wellness contract, acknowledgement of being evaluated for VAD, support group flier, ICU visitation hours, VAD newsletter, Intermacs information, picture of VAD  In body, Suda pamphlet, Living with HM II DVD, There is hope pamphlet, Tomorrow depends on the decision we make today patient education pack (HM II), [employer] information, and business cards for all VAD coordinators. Explained that we use 3 different types of pumps here and information on both pumps is in the red folder. I explained the work up process as well.     Explained to look over the entire contents and read the wellness contract, caregiver agreement and acknowledgement form.  Also explained they should bring this folder with them to all clinic visits and if they are admitted to the hospital so we can continue education as needed. Should there be any questions, please write them down and bring with you or feel free to call and we can talk on the phone. All questions answered to their  satisfaction as evidence by verbal acknowledgement.

## 2017-07-05 NOTE — PLAN OF CARE
Problem: Patient Care Overview  Goal: Plan of Care Review  Reviewed plan of care with patient.  Patient will be continuously monitored by telemetry.  Labs will be drawn and results will be monitored.  DOBUTamine and Furosemide infusing at ordered rate.  Today's procedures/assessments/consults:  LVAD workup and discharge planning will continue. Patient will report:  Bleeding, SOB, pain, dizziness, and swelling.  Patient will immediately report:  inflammation, leaking, bruising or dislodged PIV.  Patient will remain free of fall/trauma/injury by using appropriate lighting, nonskid socks, and by keeping area free of debris.  Patient will call for assistance when needed. Patient and family verbalized understanding of all instructions. Vital signs stable.  No complaints of pain or SOB.

## 2017-07-06 DIAGNOSIS — I50.9 HEART FAILURE, UNSPECIFIED HEART FAILURE CHRONICITY, UNSPECIFIED HEART FAILURE TYPE: Primary | ICD-10-CM

## 2017-07-06 PROBLEM — R97.20 ELEVATED PSA: Status: ACTIVE | Noted: 2017-07-06

## 2017-07-06 LAB
ABORH REPEAT: NORMAL
ANION GAP SERPL CALC-SCNC: 9 MMOL/L
BILIRUB DIRECT SERPL-MCNC: 1.7 MG/DL
BILIRUB UR QL STRIP: NEGATIVE
BNP SERPL-MCNC: 2045 PG/ML
BUN SERPL-MCNC: 14 MG/DL
CALCIUM SERPL-MCNC: 9.3 MG/DL
CHLORIDE SERPL-SCNC: 96 MMOL/L
CHOLEST/HDLC SERPL: 2.5 {RATIO}
CLARITY UR REFRACT.AUTO: CLEAR
CO2 SERPL-SCNC: 31 MMOL/L
COLOR UR AUTO: NORMAL
COMPLEXED PSA SERPL-MCNC: 6.9 NG/ML
CREAT SERPL-MCNC: 1.4 MG/DL
EST. GFR  (AFRICAN AMERICAN): 59.7 ML/MIN/1.73 M^2
EST. GFR  (NON AFRICAN AMERICAN): 51.6 ML/MIN/1.73 M^2
ESTIMATED AVG GLUCOSE: 108 MG/DL
FERRITIN SERPL-MCNC: 187 NG/ML
GLUCOSE SERPL-MCNC: 81 MG/DL
GLUCOSE UR QL STRIP: NEGATIVE
HBA1C MFR BLD HPLC: 5.4 %
HBV CORE AB SERPL QL IA: POSITIVE
HBV SURFACE AB SER-ACNC: POSITIVE M[IU]/ML
HBV SURFACE AG SERPL QL IA: NEGATIVE
HCV AB SERPL QL IA: NEGATIVE
HDL/CHOLESTEROL RATIO: 39.3 %
HDLC SERPL-MCNC: 122 MG/DL
HDLC SERPL-MCNC: 48 MG/DL
HEPATITIS A ANTIBODY, IGG: NEGATIVE
HGB UR QL STRIP: NEGATIVE
HIV 1+2 AB+HIV1 P24 AG SERPL QL IA: NEGATIVE
INTERNAL CAROTID STENOSIS: NORMAL
IRON SERPL-MCNC: 32 UG/DL
KETONES UR QL STRIP: NEGATIVE
LDH SERPL L TO P-CCNC: 185 U/L
LDLC SERPL CALC-MCNC: 63.4 MG/DL
LEUKOCYTE ESTERASE UR QL STRIP: NEGATIVE
NITRITE UR QL STRIP: NEGATIVE
NONHDLC SERPL-MCNC: 74 MG/DL
PH UR STRIP: 6 [PH] (ref 5–8)
PHOSPHATE SERPL-MCNC: 3.4 MG/DL
POTASSIUM SERPL-SCNC: 3.7 MMOL/L
PRE FEV1 FVC: 70
PRE FEV1: 1.93
PRE FVC: 2.74
PREALB SERPL-MCNC: 18 MG/DL
PREDICTED FEV1 FVC: 79
PREDICTED FEV1: 3.03
PREDICTED FVC: 3.79
PROT UR QL STRIP: NEGATIVE
SODIUM SERPL-SCNC: 136 MMOL/L
SP GR UR STRIP: 1.01 (ref 1–1.03)
TRANSFERRIN SERPL-MCNC: 246 MG/DL
TRIGL SERPL-MCNC: 53 MG/DL
URN SPEC COLLECT METH UR: NORMAL
UROBILINOGEN UR STRIP-ACNC: 4 EU/DL
VASCULAR ANKLE BRACHIAL INDEX (ABI) LEFT: 1.09 (ref 0.9–1.2)

## 2017-07-06 PROCEDURE — 86682 HELMINTH ANTIBODY: CPT | Mod: NTX

## 2017-07-06 PROCEDURE — 83615 LACTATE (LD) (LDH) ENZYME: CPT | Mod: NTX

## 2017-07-06 PROCEDURE — 86825 HLA X-MATH NON-CYTOTOXIC: CPT

## 2017-07-06 PROCEDURE — 80061 LIPID PANEL: CPT | Mod: NTX

## 2017-07-06 PROCEDURE — 86695 HERPES SIMPLEX TYPE 1 TEST: CPT | Mod: NTX

## 2017-07-06 PROCEDURE — 20600001 HC STEP DOWN PRIVATE ROOM: Mod: NTX

## 2017-07-06 PROCEDURE — 84134 ASSAY OF PREALBUMIN: CPT | Mod: NTX

## 2017-07-06 PROCEDURE — 86828 HLA CLASS I&II ANTIBODY QUAL: CPT | Mod: 91

## 2017-07-06 PROCEDURE — 86665 EPSTEIN-BARR CAPSID VCA: CPT | Mod: NTX

## 2017-07-06 PROCEDURE — 86704 HEP B CORE ANTIBODY TOTAL: CPT | Mod: NTX

## 2017-07-06 PROCEDURE — 97535 SELF CARE MNGMENT TRAINING: CPT | Mod: NTX

## 2017-07-06 PROCEDURE — 86977 RBC SERUM PRETX INCUBJ/INHIB: CPT

## 2017-07-06 PROCEDURE — 93880 EXTRACRANIAL BILAT STUDY: CPT | Mod: 26,NTX,, | Performed by: INTERNAL MEDICINE

## 2017-07-06 PROCEDURE — 25000003 PHARM REV CODE 250: Mod: NTX | Performed by: PHYSICIAN ASSISTANT

## 2017-07-06 PROCEDURE — 84153 ASSAY OF PSA TOTAL: CPT | Mod: NTX

## 2017-07-06 PROCEDURE — 97110 THERAPEUTIC EXERCISES: CPT | Mod: NTX

## 2017-07-06 PROCEDURE — 84100 ASSAY OF PHOSPHORUS: CPT | Mod: NTX

## 2017-07-06 PROCEDURE — 86901 BLOOD TYPING SEROLOGIC RH(D): CPT | Mod: NTX

## 2017-07-06 PROCEDURE — 84466 ASSAY OF TRANSFERRIN: CPT | Mod: NTX

## 2017-07-06 PROCEDURE — 83540 ASSAY OF IRON: CPT | Mod: NTX

## 2017-07-06 PROCEDURE — 86706 HEP B SURFACE ANTIBODY: CPT | Mod: NTX

## 2017-07-06 PROCEDURE — 81003 URINALYSIS AUTO W/O SCOPE: CPT | Mod: NTX

## 2017-07-06 PROCEDURE — 76937 US GUIDE VASCULAR ACCESS: CPT | Mod: NTX

## 2017-07-06 PROCEDURE — 82728 ASSAY OF FERRITIN: CPT | Mod: NTX

## 2017-07-06 PROCEDURE — 86900 BLOOD TYPING SEROLOGIC ABO: CPT | Mod: NTX

## 2017-07-06 PROCEDURE — 83036 HEMOGLOBIN GLYCOSYLATED A1C: CPT | Mod: NTX

## 2017-07-06 PROCEDURE — 02HV33Z INSERTION OF INFUSION DEVICE INTO SUPERIOR VENA CAVA, PERCUTANEOUS APPROACH: ICD-10-PCS | Performed by: INTERNAL MEDICINE

## 2017-07-06 PROCEDURE — 36569 INSJ PICC 5 YR+ W/O IMAGING: CPT | Mod: NTX

## 2017-07-06 PROCEDURE — 80323 ALKALOIDS NOS: CPT | Mod: NTX

## 2017-07-06 PROCEDURE — 63600175 PHARM REV CODE 636 W HCPCS: Mod: NTX | Performed by: PHYSICIAN ASSISTANT

## 2017-07-06 PROCEDURE — 86592 SYPHILIS TEST NON-TREP QUAL: CPT | Mod: NTX

## 2017-07-06 PROCEDURE — 82248 BILIRUBIN DIRECT: CPT | Mod: NTX

## 2017-07-06 PROCEDURE — 63600175 PHARM REV CODE 636 W HCPCS: Mod: NTX | Performed by: INTERNAL MEDICINE

## 2017-07-06 PROCEDURE — 81382 HLA II TYPING 1 LOC HR: CPT

## 2017-07-06 PROCEDURE — 93922 UPR/L XTREMITY ART 2 LEVELS: CPT | Mod: NTX

## 2017-07-06 PROCEDURE — 86703 HIV-1/HIV-2 1 RESULT ANTBDY: CPT | Mod: NTX

## 2017-07-06 PROCEDURE — 80048 BASIC METABOLIC PNL TOTAL CA: CPT | Mod: NTX

## 2017-07-06 PROCEDURE — 36415 COLL VENOUS BLD VENIPUNCTURE: CPT | Mod: NTX

## 2017-07-06 PROCEDURE — 86833 HLA CLASS II HIGH DEFIN QUAL: CPT

## 2017-07-06 PROCEDURE — 25000003 PHARM REV CODE 250: Mod: NTX | Performed by: NURSE PRACTITIONER

## 2017-07-06 PROCEDURE — 86777 TOXOPLASMA ANTIBODY: CPT | Mod: NTX

## 2017-07-06 PROCEDURE — 86790 VIRUS ANTIBODY NOS: CPT | Mod: NTX

## 2017-07-06 PROCEDURE — 80307 DRUG TEST PRSMV CHEM ANLYZR: CPT | Mod: NTX

## 2017-07-06 PROCEDURE — 86825 HLA X-MATH NON-CYTOTOXIC: CPT | Mod: 91

## 2017-07-06 PROCEDURE — 87340 HEPATITIS B SURFACE AG IA: CPT | Mod: NTX

## 2017-07-06 PROCEDURE — 86832 HLA CLASS I HIGH DEFIN QUAL: CPT

## 2017-07-06 PROCEDURE — 87517 HEPATITIS B DNA QUANT: CPT | Mod: NTX

## 2017-07-06 PROCEDURE — 93922 UPR/L XTREMITY ART 2 LEVELS: CPT | Mod: 26,NTX,, | Performed by: INTERNAL MEDICINE

## 2017-07-06 PROCEDURE — 81375 HLA II TYPING AG EQUIV LR: CPT

## 2017-07-06 PROCEDURE — 86829 HLA CLASS I/II ANTIBODY QUAL: CPT

## 2017-07-06 PROCEDURE — 83880 ASSAY OF NATRIURETIC PEPTIDE: CPT | Mod: NTX

## 2017-07-06 PROCEDURE — 81372 HLA I TYPING COMPLETE LR: CPT

## 2017-07-06 PROCEDURE — C1751 CATH, INF, PER/CENT/MIDLINE: HCPCS | Mod: NTX

## 2017-07-06 PROCEDURE — 99232 SBSQ HOSP IP/OBS MODERATE 35: CPT | Mod: NTX,,, | Performed by: INTERNAL MEDICINE

## 2017-07-06 PROCEDURE — 86787 VARICELLA-ZOSTER ANTIBODY: CPT | Mod: NTX

## 2017-07-06 PROCEDURE — 86803 HEPATITIS C AB TEST: CPT | Mod: NTX

## 2017-07-06 PROCEDURE — 93880 EXTRACRANIAL BILAT STUDY: CPT | Mod: NTX

## 2017-07-06 PROCEDURE — 97116 GAIT TRAINING THERAPY: CPT | Mod: NTX

## 2017-07-06 PROCEDURE — 86480 TB TEST CELL IMMUN MEASURE: CPT | Mod: NTX

## 2017-07-06 PROCEDURE — 86644 CMV ANTIBODY: CPT | Mod: NTX

## 2017-07-06 PROCEDURE — 97530 THERAPEUTIC ACTIVITIES: CPT | Mod: NTX

## 2017-07-06 RX ORDER — SODIUM CHLORIDE 0.9 % (FLUSH) 0.9 %
10 SYRINGE (ML) INJECTION EVERY 6 HOURS
Status: DISCONTINUED | OUTPATIENT
Start: 2017-07-06 | End: 2017-07-09 | Stop reason: HOSPADM

## 2017-07-06 RX ORDER — FUROSEMIDE 10 MG/ML
40 INJECTION INTRAMUSCULAR; INTRAVENOUS 2 TIMES DAILY
Status: DISCONTINUED | OUTPATIENT
Start: 2017-07-06 | End: 2017-07-07

## 2017-07-06 RX ORDER — SODIUM CHLORIDE 0.9 % (FLUSH) 0.9 %
10 SYRINGE (ML) INJECTION
Status: DISCONTINUED | OUTPATIENT
Start: 2017-07-06 | End: 2017-07-09 | Stop reason: HOSPADM

## 2017-07-06 RX ADMIN — Medication 3 ML: at 10:07

## 2017-07-06 RX ADMIN — HEPARIN SODIUM 5000 UNITS: 5000 INJECTION, SOLUTION INTRAVENOUS; SUBCUTANEOUS at 09:07

## 2017-07-06 RX ADMIN — HEPARIN SODIUM 5000 UNITS: 5000 INJECTION, SOLUTION INTRAVENOUS; SUBCUTANEOUS at 01:07

## 2017-07-06 RX ADMIN — FUROSEMIDE 40 MG: 10 INJECTION, SOLUTION INTRAVENOUS at 05:07

## 2017-07-06 RX ADMIN — SPIRONOLACTONE 25 MG: 25 TABLET, FILM COATED ORAL at 08:07

## 2017-07-06 RX ADMIN — PRAVASTATIN SODIUM 20 MG: 20 TABLET ORAL at 09:07

## 2017-07-06 RX ADMIN — DOBUTAMINE HYDROCHLORIDE 5 MCG/KG/MIN: 400 INJECTION INTRAVENOUS at 07:07

## 2017-07-06 RX ADMIN — ASPIRIN 81 MG CHEWABLE TABLET 81 MG: 81 TABLET CHEWABLE at 08:07

## 2017-07-06 RX ADMIN — FUROSEMIDE 40 MG: 10 INJECTION, SOLUTION INTRAVENOUS at 08:07

## 2017-07-06 RX ADMIN — Medication 3 ML: at 01:07

## 2017-07-06 RX ADMIN — HEPARIN SODIUM 5000 UNITS: 5000 INJECTION, SOLUTION INTRAVENOUS; SUBCUTANEOUS at 05:07

## 2017-07-06 NOTE — PLAN OF CARE
Problem: Patient Care Overview  Goal: Plan of Care Review  Pt remains free from any falls, injuries or traumas. Pt ambulates with his spouse independently in the halls. However, physical therapy has been ordered. Bed low and locked. Current regimen for heart failure is being diuresed with lasix ivp. Strict monitoring of I&o has been taken place. Encouraged pt to call for any assistance.

## 2017-07-06 NOTE — ASSESSMENT & PLAN NOTE
66yo M with history of obesity, HTN, HLD, CHF (EF 10%) who was found to have an elevated PSA during workup for LVAD/OHT    - PSA level of 6.9 can be normal in a patient of his age with an enlarged prostate. Physical exam not overly concerning for malignancy.   - Do not believe this patient should be started on 5-a reductase inhibitor medication, as this could worsen his heart failure.   - Would recommend having patient follow up with urology in 2-3 weeks with PSA check.

## 2017-07-06 NOTE — PLAN OF CARE
Problem: Physical Therapy Goal  Goal: Physical Therapy Goal  Goals to be met by: 7/15/17     Patient will increase functional independence with mobility by performin. Supine to sit with [place] - not met  2. Sit to supine with [place]- not met  3. Sit to stand transfer with [place] with no UE utilization - not met  4. Gait  x >400 feet with [place] using no UE support. - not met  5. Lower extremity exercise program x15 reps, with independence - met     Patient is progressing towards goals.    Kushal Farah, ASHLEYA

## 2017-07-06 NOTE — PT/OT/SLP PROGRESS
Physical Therapy  Treatment    Suman Hayden   MRN: [id number]   Admitting Diagnosis: Acute on chronic combined systolic and diastolic heart failure    PT Received On: 07/06/17  PT Start Time: 1344     PT Stop Time: 1407    PT Total Time (min): 23 min       Billable Minutes:  Gait [id number] and Therapeutic Exercise 11    Treatment Type: 6th Visit  PT/PTA: PTA     PTA Visit Number: 1       General Precautions: Standard, fall  Orthopedic Precautions: N/A   Braces: N/A    Do you have any cultural, spiritual, Anabaptism conflicts, given your current situation?: none stated    Subjective:  Communicated with RN prior to session.  Patient was agreeable to therapy.    Pain/Comfort  Pain Rating 1: 0/10    Objective:   Patient found with: peripheral IV, telemetry    Functional Mobility:  Bed Mobility:   Rolling/Turning Right: Supervision  Scooting/Bridging: Supervision  Supine to Sit: Supervision  Sit to Supine: Supervision    Transfers: Attempted sit to stand trials x 2; verbally reinforced sternal precautions to prepare patient for possible surgery  Sit <> Stand Assistance: Contact Guard Assistance  Sit <> Stand Assistive Device: No Assistive Device  Bed <> Chair Technique: Stand Pivot  Bed <> Chair Assistance: Supervision  Bed <> Chair Assistive Device: No Assistive Device    Gait:   Gait Distance: ~550 feet  Assistance 1: Supervision  Gait Assistive Device:  (IV pole for balance)  Gait Pattern: 3-point gait  Gait Deviation(s): decreased hal, decreased velocity of limb motion, decreased step length, decreased stride length    Therapeutic Activities and Exercises:  Reviewed sternal precautions and provided patient educational sternal precautions paper in preparation/education for possible LVAD surgery. Patient and wife verbally expressed understanding. Patient performed B LE exercises x15 each including seated LAQ's, heel/toe raises, and hip flexion, and supine heel slides, ankle pumps, hip abduction, bridges, and SLR's.  Provided patient with an HEP with these exercises to perform 15x each 3 times a day. Patient and wide verbally expressed understanding.    AM-PAC 6 CLICK MOBILITY  How much help from another person does this patient currently need?   1 = Unable, Total/Dependent Assistance  2 = A lot, Maximum/Moderate Assistance  3 = A little, Minimum/Contact Guard/Supervision  4 = None, Modified [place]/Independent    Turning over in bed (including adjusting bedclothes, sheets and blankets)?: 4  Sitting down on and standing up from a chair with arms (e.g., wheelchair, bedside commode, etc.): 3  Moving from lying on back to sitting on the side of the bed?: 4  Moving to and from a bed to a chair (including a wheelchair)?: 4  Need to walk in hospital room?: 3  Climbing 3-5 steps with a railing?: 3  Total Score: 21    AM-PAC Raw Score CMS G-Code Modifier Level of Impairment Assistance   6 % Total / Unable   7 - 9 CM 80 - 100% Maximal Assist   10 - 14 CL 60 - 80% Moderate Assist   15 - 19 CK 40 - 60% Moderate Assist   20 - 22 CJ 20 - 40% Minimal Assist   23 CI 1-20% SBA / CGA   24 CH 0% Independent/ Mod I     Patient left up in chair with all lines intact, call button in reach, nurse notified and spouse present.    Assessment:  Suman Hayden is a 67 y.o. male with a medical diagnosis of Acute on chronic combined systolic and diastolic heart failure and presents with decreased functional mobility and impairments as listed below. Pt was able to increase ambulation distance and activity tolerance this session. Patient will continue to benefit from skilled acute PT services to maximize physical level to prepare for surgery and to further educate on sternal precautions.      Rehab identified problem list/impairments: Rehab identified problem list/impairments: weakness, impaired endurance, impaired functional mobilty, gait instability, impaired balance, decreased coordination, decreased lower extremity function, impaired  coordination, impaired cardiopulmonary response to activity    Rehab potential is good.    Activity tolerance: Good    Discharge recommendations: Discharge Facility/Level Of Care Needs:  (TBD, pending hospital course)     Barriers to discharge: Barriers to Discharge: None    Equipment recommendations: Equipment Needed After Discharge:  (TBD)     GOALS:    Physical Therapy Goals        Problem: Physical Therapy Goal    Goal Priority Disciplines Outcome Goal Variances Interventions   Physical Therapy Goal     PT/OT, PT Ongoing (interventions implemented as appropriate)     Description:  Goals to be met by: 7/15/17     Patient will increase functional independence with mobility by performin. Supine to sit with [place] - not met  2. Sit to supine with [place]- not met  3. Sit to stand transfer with [place] with no UE utilization - not met  4. Gait  x >400 feet with [place] using no UE support. - not met  5. Lower extremity exercise program x15 reps, with independence - met                       PLAN:    Patient to be seen 3 x/week  to address the above listed problems via gait training, therapeutic activities, therapeutic exercises  Plan of Care expires: 17  Plan of Care reviewed with: patient, spouse    TANGELA Cordova  2017

## 2017-07-06 NOTE — NURSING
Notified Dr Leos of elevated PSA and Hep B core +.  Order received for urology consult and Hep B viral load.   Orders entered.

## 2017-07-06 NOTE — PROGRESS NOTES
[facility]  Heart Transplant  Progress Note    Patient Name: Suman Hayden  MRN: [id number]  Admission Date: 7/3/2017  Hospital Length of Stay: 3 days  Attending Physician: Ortega Leos MD  Primary Care Provider: Joe Ernst MD  Principal Problem:Acute on chronic combined systolic and diastolic heart failure    Subjective:     Interval History: Patient reports he feels better today.  He has more energy than usual and reports fluid level is at baseline.     Continuous Infusions:   DOBUTamine 5 mcg/kg/min (07/05/17 0841)    furosemide (LASIX) 1 mg/mL infusion (non-titrating) 5 mg/hr (07/05/17 2113)     Scheduled Meds:   aspirin  81 mg Oral Daily    heparin (porcine)  5,000 Units Subcutaneous Q8H    pravastatin  20 mg Oral QHS    sodium chloride 0.9%  3 mL Intravenous Q8H    spironolactone  25 mg Oral Daily     PRN Meds:    Review of patient's allergies indicates:  No Known Allergies  Objective:     Vital Signs (Most Recent):  Temp: 98.3 °F (36.8 °C) (07/06/17 0543)  Pulse: 60 (07/06/17 0543)  Resp: 18 (07/06/17 0543)  BP: (!) 92/57 (07/06/17 0543)  SpO2: 98 % (07/06/17 0543) Vital Signs (24h Range):  Temp:  [98.2 °F (36.8 °C)-98.7 °F (37.1 °C)] 98.3 °F (36.8 °C)  Pulse:  [58-62] 60  Resp:  [16-18] 18  SpO2:  [95 %-98 %] 98 %  BP: ()/(55-67) 92/57     Weight: 84.1 kg (185 lb 6.5 oz)  Body mass index is 28.19 kg/m².      Intake/Output Summary (Last 24 hours) at 07/06/17 0654  Last data filed at 07/06/17 0600   Gross per 24 hour   Intake              900 ml   Output             5500 ml   Net            -4600 ml       Telemetry: No ectopy     Physical Exam   Constitutional: He is oriented to person, place, and time. He appears well-developed and well-nourished.   Neck: Normal range of motion. Neck supple. JVD present.   Just above the clavicle    Cardiovascular: Normal rate and regular rhythm.  Exam reveals no gallop and no friction rub.    No murmur heard.  Intermittent S3    Pulmonary/Chest: Effort normal and breath sounds normal. He has no wheezes. He has no rales.   Abdominal: Soft. Bowel sounds are normal. There is no tenderness.   Musculoskeletal: He exhibits edema.   Trace non pitting lower extremity edema    Neurological: He is alert and oriented to person, place, and time.   Skin: Skin is warm and dry.       Significant Labs:  CBC:    Recent Labs  Lab 07/03/17  1343   WBC 4.77   RBC 5.05   HGB 14.4   HCT 43.1   *   MCV 85   MCH 28.5   MCHC 33.4     BNP:    Recent Labs  Lab 07/06/17  0428   BNP 2,045*     CMP:    Recent Labs  Lab 07/05/17  0520 07/06/17  0428   GLU 70 81   CALCIUM 9.3 9.3   ALBUMIN 3.3*  --    PROT 6.7  --     136   K 3.4* 3.7   CO2 29 31*   CL 98 96   BUN 15 14   CREATININE 1.4 1.4   ALKPHOS 48*  --    ALT 15  --    AST 19  --    BILITOT 3.0*  --       Coagulation:     Recent Labs  Lab 07/05/17  0520   INR 1.3*     LDH:  No results for input(s): LDH in the last 72 hours.  Microbiology:  Microbiology Results (last 7 days)     ** No results found for the last 168 hours. **          I have reviewed all pertinent labs within the past 24 hours.    Estimated Creatinine Clearance: 54.1 mL/min (based on Cr of 1.4).    Diagnostic Results: reports reviewed  CT head, abdomen and pelvis: 7/3/17  2D echo with CFD: 7/3/17  RHC: 7/3/17      Assessment and Plan:     * Acute on chronic combined systolic and diastolic heart failure    -NICM; Last 2D Echo 07/3/17: LVEF 10-15%, LVEDD 6.7 cm  -Current diuretic regimen: Furosemide gtt at 5mg/hr. Net negative 1.8L over the last 24 hours. Will transtion to 40mg IV BID today and oral tomorrow   -GDMT with Spironolactone, Sacubitril-Valsartan stopped 7/5  -Patient is currently being evaluated for OHTx/VAD  -Heart failure pathway Step 3; anticipate presentation at selection next Wednesday  -2g Na dietary restriction, 1500 mL fluid restriction, strict I/Os          HTN (hypertension)    -Blood pressure controlled over the  last 24 hours  -BP medications include Spironolactone  -Will continue current regimen.          V-tach    -reported hx of V-tach.  -ICD in place  -Amiodarone stopped this admission; device interrogation pending        Hyperlipidemia    -will continue home dose of Pravastatin             MELISSA Jon  Heart Transplant  [facility]

## 2017-07-06 NOTE — PLAN OF CARE
Problem: Occupational Therapy Goal  Goal: Occupational Therapy Goal  Goals to be met by: 7/18/2017    Patient will increase functional independence with ADLs by performing:    Pt will tolerate static standing during activity of choice for 15 minutes.   Pt will tolerate dynamic standing activity for 20 minutes with vital signs stable.   Pt will shower with mod I prior to surgery if permitted by nursing staff.   Goals achieved.      Outcome: Outcome(s) achieved Date Met: 07/06/17  Goals achieved.

## 2017-07-06 NOTE — PT/OT/SLP PROGRESS
"Occupational Therapy  Treatment    Suman Hayden   MRN: [id number]   Admitting Diagnosis: Acute on chronic combined systolic and diastolic heart failure    OT Date of Treatment: 07/06/17   OT Start Time: 1300  OT Stop Time: 1325  OT Total Time (min): 25 min    Billable Minutes:  Self Care/Home Management 15 and Therapeutic Activity 15    General Precautions: Standard, fall  Orthopedic Precautions: N/A    Do you have any cultural, spiritual, Presybeterian conflicts, given your current situation?: none    Subjective:  Communicated with nsg prior to session.  "I'm ready to get this going" pt reports re: LVAD.   Pain/Comfort  Pain Rating 1: 0/10    Objective:   Pt found supine in bed with spouse present in room.      Functional Mobility:  Pt completed functional mobility in room with independence including yasmin't of IV pole and t/f to various surfaces. . Pt also able to t/f bed, b/s chair and commode without UE support; however, he had greater difficulty with standard toilet due to decreased surface height. Education provided re: modified technique and verb/demo understanding.     Activities of Daily Living:  Pt reports and demo independence with ADL skills. Pt able to stand x approx 10 min with independence during basic g/h skills and conversation with OT.  Pt verb and demo understanding of possible sternal precautions pending possible LVAD placement.   Pt also demo good B hand strength and coordination which pt would need with possible LVAD placement.     AM-PAC 6 CLICK ADL   How much help from another person does this patient currently need?   1 = Unable, Total/Dependent Assistance  2 = A lot, Maximum/Moderate Assistance  3 = A little, Minimum/Contact Guard/Supervision  4 = None, Modified [place]/Independent    Putting on and taking off regular lower body clothing? : 4  Bathing (including washing, rinsing, drying)?: 4  Toileting, which includes using toilet, bedpan, or urinal? : 4  Putting on and taking off regular " "upper body clothing?: 4  Taking care of personal grooming such as brushing teeth?: 4  Eating meals?: 4  Total Score: 24     AM-PAC Raw Score CMS "G-Code Modifier Level of Impairment Assistance   6 % Total / Unable   7 - 8 CM 80 - 100% Maximal Assist   9-13 CL 60 - 80% Moderate Assist   14 - 19 CK 40 - 60% Moderate Assist   20 - 22 CJ 20 - 40% Minimal Assist   23 CI 1-20% SBA / CGA   24 CH 0% Independent/ Mod I       Patient left up in chair with all lines intact, call button in reach and spouse present    ASSESSMENT:  Suman Hayden is a 67 y.o. male with a medical diagnosis of Acute on chronic combined systolic and diastolic heart failure and tolerated session well with good effort and performance. Pt no longer demo need for skilled OT at this time. Pt/spouse receptive to d/c of OT services. .      GOALS:    Occupational Therapy Goals     Not on file          Multidisciplinary Problems (Resolved)        Problem: Occupational Therapy Goal    Goal Priority Disciplines Outcome Interventions   Occupational Therapy Goal   (Resolved)     OT, PT/OT Outcome(s) achieved    Description:  Goals to be met by: 7/18/2017    Patient will increase functional independence with ADLs by performing:    Pt will tolerate static standing during activity of choice for 15 minutes.   Pt will tolerate dynamic standing activity for 20 minutes with vital signs stable.   Pt will shower with mod I prior to surgery if permitted by nursing staff.   Goals achieved.                        Plan:  OT to d/c services at this time.        DELMY Navarro  07/06/2017  "

## 2017-07-06 NOTE — HPI
Mr. Hayden is a 66yo male with a past medical history of HLD, HTN, obesity and CHF (EF 10% in 07/2017) who has being considered for LVAD/OHT. His CHF has progressively gotten worse over the past 6 months and he has required multiple admissions to the hospital due to symptoms related to his CHF.     He was found to have an elevated PSA (6.9) level during his transplant workup. There are no records on file of any previous PSA levels but the patient says he has seen a urologist in [place] 15 years ago for a circumcision. He was never told of an elevated PSA or abnormal CAMDEN at that time. He has no personal or family history of renal, bladder or prostate cancer. He does not endorse dysuria, hematuria, frequency, significant urgency, hesitancy, nocturia, or weak FOS. The only time he gets up at night to void is when his lasix is increased. No bone pain. He has no history of kidney stones.     Patient is currently on 81mg ASA and subQ heparin.

## 2017-07-06 NOTE — ASSESSMENT & PLAN NOTE
-NICM; Last 2D Echo 07/3/17: LVEF 10-15%, LVEDD 6.7 cm  -Current diuretic regimen: Furosemide gtt at 5mg/hr. Net negative 1.8L over the last 24 hours. Will transtion to 40mg IV BID today and oral tomorrow   -GDMT with Spironolactone, Sacubitril-Valsartan stopped 7/5  -Patient is currently being evaluated for OHTx/VAD  -Heart failure pathway Step 3; anticipate presentation at selection next Wednesday  -2g Na dietary restriction, 1500 mL fluid restriction, strict I/Os

## 2017-07-06 NOTE — PROGRESS NOTES
Pt not in room. Wife AAAO.  She and her  have been reading the  materials.  Reviewed education forms with her and will return tomorrow for VAD education

## 2017-07-06 NOTE — CONSULTS
Double lumen PICC placed in right brachial vein , 39cm in length with 0cm exposed. Arm circumference 32cm. Lot #[id number].

## 2017-07-06 NOTE — ASSESSMENT & PLAN NOTE
-Blood pressure controlled over the last 24 hours  -BP medications include Spironolactone  -Will continue current regimen.

## 2017-07-06 NOTE — SUBJECTIVE & OBJECTIVE
Past Medical History:   Diagnosis Date    Cardiomyopathy     Hyperlipidemia     Hypertension     Obesity     S/P implantation of automatic cardioverter/defibrillator (AICD)     Ventricular tachycardia        Past Surgical History:   Procedure Laterality Date    CARDIAC CATHETERIZATION      CARDIAC DEFIBRILLATOR PLACEMENT         Review of patient's allergies indicates:  No Known Allergies    Family History     None          Social History Main Topics    Smoking status: Never Smoker    Smokeless tobacco: Never Used    Alcohol use No    Drug use: Unknown    Sexual activity: Not on file       Review of Systems   Constitutional: Negative for appetite change, fever and unexpected weight change.   HENT: Negative.    Eyes: Negative.    Respiratory: Positive for shortness of breath.    Cardiovascular: Positive for leg swelling. Negative for chest pain.   Gastrointestinal: Negative for abdominal pain, blood in stool, diarrhea and rectal pain.   Genitourinary: Negative for difficulty urinating, enuresis, flank pain, frequency, hematuria, nocturia and urgency.   Skin: Negative.    Neurological: Negative.        Objective:     Temp:  [97.7 °F (36.5 °C)-98.5 °F (36.9 °C)] 98.4 °F (36.9 °C)  Pulse:  [59-66] 60  Resp:  [18] 18  SpO2:  [96 %-99 %] 96 %  BP: (88-92)/(53-58) 88/55     Body mass index is 28.19 kg/m².      Date 07/06/17 0700 - 07/07/17 0659   Shift 7317-8164 2252-6699 3365-1794 24 Hour Total   I  N  T  A  K  E   P.O. 840   840    Shift Total  (mL/kg) 840  (10)   840  (10)   O  U  T  P  U  T   Urine  (mL/kg/hr) 675  (1) 150  825    Shift Total  (mL/kg) 675  (8) 150  (1.8)  825  (9.8)   Weight (kg) 84.1 84.1 84.1 84.1          Drains          No matching active lines, drains, or airways          Physical Exam   Constitutional: He appears well-developed and well-nourished. No distress.   HENT:   Head: Normocephalic and atraumatic.   Eyes: EOM are normal. No scleral icterus.   Neck: Normal range of motion.  Neck supple.   Cardiovascular: Normal rate and regular rhythm.    Pulmonary/Chest: Effort normal and breath sounds normal. No respiratory distress.   Abdominal: Soft. He exhibits no distension. There is no tenderness.   Genitourinary:   Genitourinary Comments: Circumcised penis  Scrotal exam benign, no testicular masses  CAMDEN: 50g prostate, smooth, no nodules, median furrow and lateral landmarks palpable       Significant Labs:    BMP:    Recent Labs  Lab 07/04/17  0547 07/05/17  0520 07/06/17  0428    138 136   K 3.8 3.4* 3.7    98 96   CO2 22* 29 31*   BUN 20 15 14   CREATININE 1.4 1.4 1.4   CALCIUM 9.0 9.3 9.3       CBC:    Recent Labs  Lab 07/03/17  0750 07/03/17  1343   WBC 4.77 4.77   HGB 14.2 14.4   HCT 43.5 43.1   * 130*       All pertinent labs results from the past 24 hours have been reviewed.    Significant Imaging:  All pertinent imaging results/findings from the past 24 hours have been reviewed.

## 2017-07-06 NOTE — PLAN OF CARE
Problem: Patient Care Overview  Goal: Plan of Care Review  Outcome: Ongoing (interventions implemented as appropriate)  Pt free of falls, injury this shift. POC reviewed with pt at bedside, verbalized understanding. Lasix, Dobutamine infusing at ordered rate, see MAR. Consult to CTS and VAD coordinator for pre-VAD education completed. VSS, no events overnight. Pt denies SOB, CP at this time. Pt has no questions. Will continue to monitor.

## 2017-07-06 NOTE — SUBJECTIVE & OBJECTIVE
Interval History: Patient reports he feels better today.  He has more energy than usual and reports fluid level is at baseline.     Continuous Infusions:   DOBUTamine 5 mcg/kg/min (07/05/17 0841)    furosemide (LASIX) 1 mg/mL infusion (non-titrating) 5 mg/hr (07/05/17 2113)     Scheduled Meds:   aspirin  81 mg Oral Daily    heparin (porcine)  5,000 Units Subcutaneous Q8H    pravastatin  20 mg Oral QHS    sodium chloride 0.9%  3 mL Intravenous Q8H    spironolactone  25 mg Oral Daily     PRN Meds:    Review of patient's allergies indicates:  No Known Allergies  Objective:     Vital Signs (Most Recent):  Temp: 98.3 °F (36.8 °C) (07/06/17 0543)  Pulse: 60 (07/06/17 0543)  Resp: 18 (07/06/17 0543)  BP: (!) 92/57 (07/06/17 0543)  SpO2: 98 % (07/06/17 0543) Vital Signs (24h Range):  Temp:  [98.2 °F (36.8 °C)-98.7 °F (37.1 °C)] 98.3 °F (36.8 °C)  Pulse:  [58-62] 60  Resp:  [16-18] 18  SpO2:  [95 %-98 %] 98 %  BP: ()/(55-67) 92/57     Weight: 84.1 kg (185 lb 6.5 oz)  Body mass index is 28.19 kg/m².      Intake/Output Summary (Last 24 hours) at 07/06/17 0654  Last data filed at 07/06/17 0600   Gross per 24 hour   Intake              900 ml   Output             5500 ml   Net            -4600 ml       Telemetry: No ectopy     Physical Exam   Constitutional: He is oriented to person, place, and time. He appears well-developed and well-nourished.   Neck: Normal range of motion. Neck supple. JVD present.   Just above the clavicle    Cardiovascular: Normal rate and regular rhythm.  Exam reveals no gallop and no friction rub.    No murmur heard.  Intermittent S3   Pulmonary/Chest: Effort normal and breath sounds normal. He has no wheezes. He has no rales.   Abdominal: Soft. Bowel sounds are normal. There is no tenderness.   Musculoskeletal: He exhibits edema.   Trace non pitting lower extremity edema    Neurological: He is alert and oriented to person, place, and time.   Skin: Skin is warm and dry.       Significant  Labs:  CBC:    Recent Labs  Lab 07/03/17  1343   WBC 4.77   RBC 5.05   HGB 14.4   HCT 43.1   *   MCV 85   MCH 28.5   MCHC 33.4     BNP:    Recent Labs  Lab 07/06/17  0428   BNP 2,045*     CMP:    Recent Labs  Lab 07/05/17  0520 07/06/17  0428   GLU 70 81   CALCIUM 9.3 9.3   ALBUMIN 3.3*  --    PROT 6.7  --     136   K 3.4* 3.7   CO2 29 31*   CL 98 96   BUN 15 14   CREATININE 1.4 1.4   ALKPHOS 48*  --    ALT 15  --    AST 19  --    BILITOT 3.0*  --       Coagulation:     Recent Labs  Lab 07/05/17 0520   INR 1.3*     LDH:  No results for input(s): LDH in the last 72 hours.  Microbiology:  Microbiology Results (last 7 days)     ** No results found for the last 168 hours. **          I have reviewed all pertinent labs within the past 24 hours.    Estimated Creatinine Clearance: 54.1 mL/min (based on Cr of 1.4).    Diagnostic Results: reports reviewed  CT head, abdomen and pelvis: 7/3/17  2D echo with CFD: 7/3/17  RHC: 7/3/17

## 2017-07-06 NOTE — CONSULTS
[facility]  Urology  Consult Note    Patient Name: Suman Hayden  MRN: [id number]  Admission Date: 7/3/2017  Hospital Length of Stay: 3   Code Status: Full Code   Attending Provider: Dr. Dick Condon  Consulting Provider: Darien Nesbitt MD  Primary Care Physician: Joe Ernst MD  Principal Problem:Acute on chronic combined systolic and diastolic heart failure    Consults    Subjective:     HPI:  Mr. Hayden is a 68yo male with a past medical history of HLD, HTN, obesity and CHF (EF 10% in 07/2017) who is being considered for LVAD/OHT.  His CHF has progressively gotten worse over the past 6 months and he has required multiple admissions to the hospital due to symptoms related to his CHF.     He was found to have an elevated PSA (6.9) level during his transplant workup. There are no records on file of any previous PSA levels but the patient says he has seen a urologist in [place] 15 years ago for a circumcision. He was never told of an elevated PSA or abnormal CAMDEN at that time. He has no personal or family history of renal, bladder or prostate cancer. He does not endorse dysuria, hematuria, frequency, significant urgency, hesitancy, nocturia, or weak FOS. The only time he gets up at night to void is when his lasix is increased. No bone pain. He has no history of kidney stones.     Patient is currently on 81mg ASA and subQ heparin.       Past Medical History:   Diagnosis Date    Cardiomyopathy     Hyperlipidemia     Hypertension     Obesity     S/P implantation of automatic cardioverter/defibrillator (AICD)     Ventricular tachycardia        Past Surgical History:   Procedure Laterality Date    CARDIAC CATHETERIZATION      CARDIAC DEFIBRILLATOR PLACEMENT         Review of patient's allergies indicates:  No Known Allergies    Family History     None          Social History Main Topics    Smoking status: Never Smoker    Smokeless tobacco: Never Used    Alcohol use No    Drug use: Unknown     Sexual activity: Not on file       Review of Systems   Constitutional: Negative for appetite change, fever and unexpected weight change.   HENT: Negative.    Eyes: Negative.    Respiratory: Positive for shortness of breath.    Cardiovascular: Positive for leg swelling. Negative for chest pain.   Gastrointestinal: Negative for abdominal pain, blood in stool, diarrhea and rectal pain.   Genitourinary: Negative for difficulty urinating, enuresis, flank pain, frequency, hematuria, nocturia and urgency.   Skin: Negative.    Neurological: Negative.        Objective:     Temp:  [97.7 °F (36.5 °C)-98.5 °F (36.9 °C)] 98.4 °F (36.9 °C)  Pulse:  [59-66] 60  Resp:  [18] 18  SpO2:  [96 %-99 %] 96 %  BP: (88-92)/(53-58) 88/55     Body mass index is 28.19 kg/m².      Date 07/06/17 0700 - 07/07/17 0659   Shift 2966-3313 4733-7229 7032-8276 24 Hour Total   I  N  T  A  K  E   P.O. 840   840    Shift Total  (mL/kg) 840  (10)   840  (10)   O  U  T  P  U  T   Urine  (mL/kg/hr) 675  (1) 150  825    Shift Total  (mL/kg) 675  (8) 150  (1.8)  825  (9.8)   Weight (kg) 84.1 84.1 84.1 84.1          Drains          No matching active lines, drains, or airways          Physical Exam   Constitutional: He appears well-developed and well-nourished. No distress.   HENT:   Head: Normocephalic and atraumatic.   Eyes: EOM are normal. No scleral icterus.   Neck: Normal range of motion. Neck supple.   Cardiovascular: Normal rate and regular rhythm.    Pulmonary/Chest: Effort normal and breath sounds normal. No respiratory distress.   Abdominal: Soft. He exhibits no distension. There is no tenderness.   Genitourinary:   Genitourinary Comments: Circumcised penis  Scrotal exam benign, no testicular masses  CAMDEN: 50g prostate, smooth, no nodules, median furrow and lateral landmarks palpable       Significant Labs:    BMP:    Recent Labs  Lab 07/04/17  0547 07/05/17  0520 07/06/17  0428    138 136   K 3.8 3.4* 3.7    98 96   CO2 22* 29 31*    BUN 20 15 14   CREATININE 1.4 1.4 1.4   CALCIUM 9.0 9.3 9.3       CBC:    Recent Labs  Lab 07/03/17  0750 07/03/17  1343   WBC 4.77 4.77   HGB 14.2 14.4   HCT 43.5 43.1   * 130*       All pertinent labs results from the past 24 hours have been reviewed.    Significant Imaging:  All pertinent imaging results/findings from the past 24 hours have been reviewed.                    Assessment and Plan:     Elevated PSA    66yo M with history of obesity, HTN, HLD, CHF (EF 10%) who was found to have an elevated PSA during workup for LVAD/OHT    - PSA level of 6.9 can be normal in a patient of his age with an enlarged prostate. Physical exam not overly concerning for malignancy.   - Do not believe this patient should be started on 5-a reductase inhibitor medication, as this could worsen his heart failure.   - Would recommend having patient follow up with urology in 2-3 weeks with PSA check.               * Acute on chronic combined systolic and diastolic heart failure    See plan for Elevated PSA            VTE Risk Mitigation         Ordered     heparin (porcine) injection 5,000 Units  Every 8 hours     Route:  Subcutaneous        07/03/17 1213     Medium Risk of VTE  Once      07/03/17 1213          Thank you for your consult. I will sign off. Please contact us if you have any additional questions.    Darien Nesbitt MD  Urology  [facility]

## 2017-07-07 ENCOUNTER — TELEPHONE (OUTPATIENT)
Dept: ADMINISTRATIVE | Facility: HOSPITAL | Age: 67
End: 2017-07-07

## 2017-07-07 PROBLEM — R97.20 ELEVATED PSA: Status: ACTIVE | Noted: 2017-07-07

## 2017-07-07 PROBLEM — R76.8 HEPATITIS B CORE ANTIBODY POSITIVE: Status: ACTIVE | Noted: 2017-07-07

## 2017-07-07 PROBLEM — I50.9 ACUTE ON CHRONIC HEART FAILURE: Status: ACTIVE | Noted: 2017-07-07

## 2017-07-07 PROBLEM — I50.23 ACUTE ON CHRONIC SYSTOLIC HEART FAILURE: Status: ACTIVE | Noted: 2017-07-07

## 2017-07-07 LAB
ALBUMIN SERPL BCP-MCNC: 3 G/DL
ALP SERPL-CCNC: 53 U/L
ALT SERPL W/O P-5'-P-CCNC: 13 U/L
ANION GAP SERPL CALC-SCNC: 10 MMOL/L
ANION GAP SERPL CALC-SCNC: 10 MMOL/L
APTT BLDCRRT: 28.3 SEC
AST SERPL-CCNC: 17 U/L
BASOPHILS # BLD AUTO: 0.01 K/UL
BASOPHILS NFR BLD: 0.2 %
BILIRUB DIRECT SERPL-MCNC: 1.6 MG/DL
BILIRUB SERPL-MCNC: 3 MG/DL
BUN SERPL-MCNC: 15 MG/DL
BUN SERPL-MCNC: 15 MG/DL
CALCIUM SERPL-MCNC: 9 MG/DL
CALCIUM SERPL-MCNC: 9 MG/DL
CARDIOLIPIN IGG SER IA-ACNC: <9.4 GPL
CARDIOLIPIN IGM SER IA-ACNC: <9.4 MPL
CHLORIDE SERPL-SCNC: 96 MMOL/L
CHLORIDE SERPL-SCNC: 96 MMOL/L
CMV IGG SERPL QL IA: REACTIVE
CO2 SERPL-SCNC: 29 MMOL/L
CO2 SERPL-SCNC: 29 MMOL/L
CREAT SERPL-MCNC: 1.2 MG/DL
CREAT SERPL-MCNC: 1.2 MG/DL
CRP SERPL-MCNC: 31 MG/L
DIFFERENTIAL METHOD: ABNORMAL
EBV VCA IGG SER QL IA: POSITIVE
EOSINOPHIL # BLD AUTO: 0.2 K/UL
EOSINOPHIL NFR BLD: 3.1 %
ERYTHROCYTE [DISTWIDTH] IN BLOOD BY AUTOMATED COUNT: 17.2 %
EST. GFR  (AFRICAN AMERICAN): >60 ML/MIN/1.73 M^2
EST. GFR  (AFRICAN AMERICAN): >60 ML/MIN/1.73 M^2
EST. GFR  (NON AFRICAN AMERICAN): >60 ML/MIN/1.73 M^2
EST. GFR  (NON AFRICAN AMERICAN): >60 ML/MIN/1.73 M^2
FACT VIII ACT/NOR PPP: 209 %
FIBRINOGEN PPP-MCNC: 373 MG/DL
GLUCOSE SERPL-MCNC: 66 MG/DL
GLUCOSE SERPL-MCNC: 66 MG/DL
HCT VFR BLD AUTO: 42.4 %
HCYS SERPL-SCNC: 16.3 UMOL/L
HGB BLD-MCNC: 14.4 G/DL
HSV1 IGG SERPL QL IA: POSITIVE
HSV2 IGG SERPL QL IA: NEGATIVE
INR PPP: 1.1
LA PPP-IMP: NEGATIVE
LYMPHOCYTES # BLD AUTO: 1.5 K/UL
LYMPHOCYTES NFR BLD: 25.2 %
MCH RBC QN AUTO: 28.9 PG
MCHC RBC AUTO-ENTMCNC: 34 %
MCV RBC AUTO: 85 FL
MITOGEN NIL: 7.81 IU/ML
MONOCYTES # BLD AUTO: 0.8 K/UL
MONOCYTES NFR BLD: 12.8 %
NEUTROPHILS # BLD AUTO: 3.6 K/UL
NEUTROPHILS NFR BLD: 58.7 %
NIL: 0.07 IU/ML
PLATELET # BLD AUTO: 129 K/UL
PLATELET BLD QL SMEAR: ABNORMAL
PLATELET FUNCTION ASSAY - EPINEPHRINE: 99 SECS
PMV BLD AUTO: 11.1 FL
POTASSIUM SERPL-SCNC: 3.7 MMOL/L
POTASSIUM SERPL-SCNC: 3.7 MMOL/L
PROT SERPL-MCNC: 6.4 G/DL
PROTHROMBIN TIME: 11.9 SEC
RBC # BLD AUTO: 4.99 M/UL
RPR SER QL: NORMAL
SODIUM SERPL-SCNC: 135 MMOL/L
SODIUM SERPL-SCNC: 135 MMOL/L
STRONGYLOIDES ANTIBODY IGG: NEGATIVE
TB ANTIGEN NIL: -0.01 IU/ML
TB ANTIGEN: 0.06 IU/ML
TB GOLD: NEGATIVE
TSH SERPL DL<=0.005 MIU/L-ACNC: 2.48 UIU/ML
WBC # BLD AUTO: 6.11 K/UL

## 2017-07-07 PROCEDURE — 99232 SBSQ HOSP IP/OBS MODERATE 35: CPT | Mod: TXP,,, | Performed by: INTERNAL MEDICINE

## 2017-07-07 PROCEDURE — 85613 RUSSELL VIPER VENOM DILUTED: CPT | Mod: NTX

## 2017-07-07 PROCEDURE — 81241 F5 GENE: CPT | Mod: NTX

## 2017-07-07 PROCEDURE — 25000003 PHARM REV CODE 250: Mod: NTX

## 2017-07-07 PROCEDURE — 94727 GAS DIL/WSHOT DETER LNG VOL: CPT | Mod: NTX | Performed by: INTERNAL MEDICINE

## 2017-07-07 PROCEDURE — 85301 ANTITHROMBIN III ANTIGEN: CPT | Mod: NTX

## 2017-07-07 PROCEDURE — C1894 INTRO/SHEATH, NON-LASER: HCPCS | Mod: NTX

## 2017-07-07 PROCEDURE — 25000003 PHARM REV CODE 250: Mod: NTX | Performed by: PHYSICIAN ASSISTANT

## 2017-07-07 PROCEDURE — 20600001 HC STEP DOWN PRIVATE ROOM: Mod: NTX

## 2017-07-07 PROCEDURE — 84443 ASSAY THYROID STIM HORMONE: CPT | Mod: NTX

## 2017-07-07 PROCEDURE — 25000003 PHARM REV CODE 250: Mod: NTX | Performed by: NURSE PRACTITIONER

## 2017-07-07 PROCEDURE — B2011ZZ PLAIN RADIOGRAPHY OF MULTIPLE CORONARY ARTERIES USING LOW OSMOLAR CONTRAST: ICD-10-PCS | Performed by: INTERNAL MEDICINE

## 2017-07-07 PROCEDURE — 83090 ASSAY OF HOMOCYSTEINE: CPT | Mod: NTX

## 2017-07-07 PROCEDURE — 93451 RIGHT HEART CATH: CPT | Mod: 26,NTX,, | Performed by: INTERNAL MEDICINE

## 2017-07-07 PROCEDURE — 94729 DIFFUSING CAPACITY: CPT | Mod: NTX | Performed by: INTERNAL MEDICINE

## 2017-07-07 PROCEDURE — 85247 CLOT FACTOR VIII MULTIMETRIC: CPT | Mod: NTX

## 2017-07-07 PROCEDURE — 86147 CARDIOLIPIN ANTIBODY EA IG: CPT | Mod: NTX

## 2017-07-07 PROCEDURE — 85384 FIBRINOGEN ACTIVITY: CPT | Mod: NTX

## 2017-07-07 PROCEDURE — 85397 CLOTTING FUNCT ACTIVITY: CPT | Mod: NTX

## 2017-07-07 PROCEDURE — 85305 CLOT INHIBIT PROT S TOTAL: CPT | Mod: NTX

## 2017-07-07 PROCEDURE — 94010 BREATHING CAPACITY TEST: CPT | Mod: NTX | Performed by: INTERNAL MEDICINE

## 2017-07-07 PROCEDURE — 80048 BASIC METABOLIC PNL TOTAL CA: CPT | Mod: NTX

## 2017-07-07 PROCEDURE — 81240 F2 GENE: CPT | Mod: NTX

## 2017-07-07 PROCEDURE — 85025 COMPLETE CBC W/AUTO DIFF WBC: CPT | Mod: NTX

## 2017-07-07 PROCEDURE — 85730 THROMBOPLASTIN TIME PARTIAL: CPT | Mod: NTX

## 2017-07-07 PROCEDURE — 82955 ASSAY OF G6PD ENZYME: CPT | Mod: NTX

## 2017-07-07 PROCEDURE — 85576 BLOOD PLATELET AGGREGATION: CPT | Mod: NTX

## 2017-07-07 PROCEDURE — 85240 CLOT FACTOR VIII AHG 1 STAGE: CPT | Mod: NTX

## 2017-07-07 PROCEDURE — 85610 PROTHROMBIN TIME: CPT | Mod: NTX

## 2017-07-07 PROCEDURE — 80076 HEPATIC FUNCTION PANEL: CPT | Mod: NTX

## 2017-07-07 PROCEDURE — 4A023N6 MEASUREMENT OF CARDIAC SAMPLING AND PRESSURE, RIGHT HEART, PERCUTANEOUS APPROACH: ICD-10-PCS | Performed by: INTERNAL MEDICINE

## 2017-07-07 PROCEDURE — 85246 CLOT FACTOR VIII VW ANTIGEN: CPT | Mod: NTX

## 2017-07-07 PROCEDURE — 86140 C-REACTIVE PROTEIN: CPT | Mod: NTX

## 2017-07-07 PROCEDURE — 25000003 PHARM REV CODE 250: Mod: NTX | Performed by: INTERNAL MEDICINE

## 2017-07-07 PROCEDURE — 85306 CLOT INHIBIT PROT S FREE: CPT | Mod: NTX

## 2017-07-07 PROCEDURE — 85302 CLOT INHIBIT PROT C ANTIGEN: CPT | Mod: NTX

## 2017-07-07 PROCEDURE — C1751 CATH, INF, PER/CENT/MIDLINE: HCPCS | Mod: NTX

## 2017-07-07 RX ORDER — FUROSEMIDE 40 MG/1
40 TABLET ORAL 2 TIMES DAILY
Status: DISCONTINUED | OUTPATIENT
Start: 2017-07-07 | End: 2017-07-09 | Stop reason: HOSPADM

## 2017-07-07 RX ORDER — POTASSIUM CHLORIDE 750 MG/1
30 CAPSULE, EXTENDED RELEASE ORAL ONCE
Status: COMPLETED | OUTPATIENT
Start: 2017-07-07 | End: 2017-07-07

## 2017-07-07 RX ADMIN — PRAVASTATIN SODIUM 20 MG: 20 TABLET ORAL at 09:07

## 2017-07-07 RX ADMIN — Medication 3 ML: at 02:07

## 2017-07-07 RX ADMIN — SPIRONOLACTONE 25 MG: 25 TABLET, FILM COATED ORAL at 08:07

## 2017-07-07 RX ADMIN — FUROSEMIDE 40 MG: 40 TABLET ORAL at 05:07

## 2017-07-07 RX ADMIN — FUROSEMIDE 40 MG: 40 TABLET ORAL at 08:07

## 2017-07-07 RX ADMIN — HEPARIN SODIUM 5000 UNITS: 5000 INJECTION, SOLUTION INTRAVENOUS; SUBCUTANEOUS at 05:07

## 2017-07-07 RX ADMIN — ASPIRIN 81 MG CHEWABLE TABLET 81 MG: 81 TABLET CHEWABLE at 08:07

## 2017-07-07 RX ADMIN — HEPARIN SODIUM 5000 UNITS: 5000 INJECTION, SOLUTION INTRAVENOUS; SUBCUTANEOUS at 09:07

## 2017-07-07 RX ADMIN — Medication 10 ML: at 05:07

## 2017-07-07 RX ADMIN — HEPARIN SODIUM 5000 UNITS: 5000 INJECTION, SOLUTION INTRAVENOUS; SUBCUTANEOUS at 01:07

## 2017-07-07 RX ADMIN — POTASSIUM CHLORIDE 30 MEQ: 750 CAPSULE, EXTENDED RELEASE ORAL at 11:07

## 2017-07-07 RX ADMIN — Medication 10 ML: at 12:07

## 2017-07-07 NOTE — ASSESSMENT & PLAN NOTE
Nutrition Problem:   Increased nutrient needs    Etiology/Related to:   physiological need    As Evidence By:   HF, LVAD/OH, Transplant x w/u.    Nutrition Diagnosis Status:   Continues      Plan:  1. Educated pt wife on LVAD wx material, will follow-up with pt this PM

## 2017-07-07 NOTE — PROGRESS NOTES
[facility]  Heart Transplant  Progress Note    Patient Name: Suman Hayden  MRN: [id number]  Admission Date: 7/3/2017  Hospital Length of Stay: 4 days  Attending Physician: Ortega Leos MD  Primary Care Provider: Joe Ernst MD  Principal Problem:Acute on chronic combined systolic and diastolic heart failure    Subjective:     Interval History: Patient has no complaints this morning.  He reports he is feeling better and he is ready to go home.  Urology consulted for elevated PSA and recommending outpatient follow up     Continuous Infusions:   DOBUTamine 5 mcg/kg/min (07/06/17 1936)     Scheduled Meds:   aspirin  81 mg Oral Daily    furosemide  40 mg Intravenous BID    heparin (porcine)  5,000 Units Subcutaneous Q8H    pravastatin  20 mg Oral QHS    sodium chloride 0.9%  10 mL Intravenous Q6H    sodium chloride 0.9%  3 mL Intravenous Q8H    spironolactone  25 mg Oral Daily     PRN Meds:    Review of patient's allergies indicates:  No Known Allergies  Objective:     Vital Signs (Most Recent):  Temp: 98.4 °F (36.9 °C) (07/07/17 0430)  Pulse: 84 (07/07/17 0430)  Resp: 20 (07/07/17 0430)  BP: (!) 97/52 (07/07/17 0430)  SpO2: 95 % (07/07/17 0430) Vital Signs (24h Range):  Temp:  [97.5 °F (36.4 °C)-98.6 °F (37 °C)] 98.4 °F (36.9 °C)  Pulse:  [58-84] 84  Resp:  [18-20] 20  SpO2:  [95 %-99 %] 95 %  BP: ()/(52-65) 97/52     Weight: 83.8 kg (184 lb 11.9 oz)  Body mass index is 28.09 kg/m².      Intake/Output Summary (Last 24 hours) at 07/07/17 0711  Last data filed at 07/07/17 0600   Gross per 24 hour   Intake           1183.4 ml   Output             1475 ml   Net           -291.6 ml       Telemetry: No ectopy     Physical Exam   Constitutional: He is oriented to person, place, and time. He appears well-developed and well-nourished.   Neck: Normal range of motion. Neck supple. JVD present.   Just above the clavicle    Cardiovascular: Normal rate and regular rhythm.  Exam reveals no gallop  and no friction rub.    No murmur heard.  Intermittent S3   Pulmonary/Chest: Effort normal and breath sounds normal. He has no wheezes. He has no rales.   Abdominal: Soft. Bowel sounds are normal. There is no tenderness.   Musculoskeletal: He exhibits no edema.   Neurological: He is alert and oriented to person, place, and time.   Skin: Skin is warm and dry.       Significant Labs:  CBC:  No results for input(s): WBC, RBC, HGB, HCT, PLT, MCV, MCH, MCHC in the last 72 hours.  BNP:    Recent Labs  Lab 07/06/17  0428   BNP 2,045*     CMP:    Recent Labs  Lab 07/07/17  0457   GLU 66*  66*   CALCIUM 9.0  9.0   ALBUMIN 3.0*   PROT 6.4   *  135*   K 3.7  3.7   CO2 29  29   CL 96  96   BUN 15  15   CREATININE 1.2  1.2   ALKPHOS 53*   ALT 13   AST 17   BILITOT 3.0*      Coagulation:     Recent Labs  Lab 07/07/17  0458   INR 1.1   APTT 28.3     LDH:    Recent Labs  Lab 07/06/17  0932        Microbiology:  Microbiology Results (last 7 days)     ** No results found for the last 168 hours. **          I have reviewed all pertinent labs within the past 24 hours.    Estimated Creatinine Clearance: 63 mL/min (based on Cr of 1.2).    Diagnostic Results: reports reviewed  CT head, abdomen and pelvis: 7/3/17  2D echo with CFD: 7/3/17  RHC: 7/3/17      Assessment and Plan:     68 yo male with reported hx NICM: Ef 10-15%, HTN, HLP, ICD admitted from procedure room for ADHF, inotrope's and advance options workup    * Acute on chronic combined systolic and diastolic heart failure    -NICM; Last 2D Echo 07/3/17: LVEF 10-15%, LVEDD 6.7 cm  -Diuresed with Lasix infusion at 10mg/hr; decreased to 5 and transitioned to IVP.  Net negative 291cc in the last 24 hours.  Will change to oral Lasix today and await resulted of RHC   -GDMT with Spironolactone, Sacubitril-Valsartan stopped 7/5  -Patient is currently being evaluated for OHTx/VAD  -Anticipate presentation at selection next Wednesday  -Pending results of RHC; anticipate  discharge over the weekend on Dobutamine. (home health orders are done)  -2g Na dietary restriction, 1500 mL fluid restriction, strict I/Os          HTN (hypertension)    -Blood pressure controlled over the last 24 hours  -BP medications include Spironolactone  -Will continue current regimen.          V-tach    -reported hx of V-tach.  -ICD in place  -Amiodarone stopped this admission; device interrogation pending        Hyperlipidemia    -will continue home dose of Pravastatin         Elevated PSA    -Appreciate Urology consultation: not overly concerning for malignancy. Recommending outpatient follow up        Hepatitis B core antibody positive    -Hepatitis B viral load ordered and pending            MELISSA Jon  Heart Transplant  [facility]

## 2017-07-07 NOTE — PROGRESS NOTES
Pt and wife VERÓNICA.  Reviewed verbal and written VAD education. Pt and wife verbalized that she does all of the reading seeing that he has trouble comprehending.  They report this has been since he had rheumatic fever.  Explained the education process after VAD and all that will be expected prior to discharge.  Discussed follow up care, lifestyle modifications and much more.       Why MCSD is Needed  Your heart failure is defined as a condition in which your heart is unable to pump enough blood to support the basic needs of your body. This can make you feel tired, have abnormal rhythms and shortness of breath, in addition to causing your other organs to fail (e.g. liver or kidneys). You are being offered this treatment option because you have a marked increase risk of irreversible end-organ damage or death. For this reason, you are being considered for placement of a Mechanical Circulatory Support Device (MCSD) at [facility]. The heart pump is designed to take over the pumping action of your heart but before you undergo this procedure, it is important that you and your family understand the options, benefits, risks, and expectations associated with having a MCSD. It is required that you and your proposed caregiver(s) understand and agree with the treatment plan and are willing to participate in the guidelines outlined in the following pages.      ______At this time, you are being considered for a MCSD or more commonly called a Ventricular Assist Device (VAD) for Bridge to Transplantation. Bridge to transplant (BTT) is when a VAD is used to help extend the life of someone waiting for a heart transplant. This is subject to change pending the results from your evaluation and your Physicians decision. This consent pertains only to VAD therapy; you will receive information regarding heart transplantation allocation, procedures, and risks from the transplant program at a different time. Although you are  being considered for MCSD implantation for Bridge to Transplantation, it is possible that you will not be a transplant candidate after you receive the MCSD if your medical condition worsens.     OR    _____At this time, you are being considered for a Ventricular Assist Device (VAD) for Destination Therapy. Destination therapy is when a VAD is used as a long-term treatment for patients who are not candidates for transplant, such as those with end-stage congestive heart failure. In these patients, the pumps are placed permanently to help the heart work better. This is subject to change pending the results from your evaluation and your Physicians decision.      Types of Mechanical Circulatory Support Devices:  A MCSD is a pump that assists or replaces a weakened heart and carries blood to the rest of your body. There are several different types of mechanical circulatory support devices:    -Left ventricular assist devices (LVAD) help the left side of the heart pump blood to the largest artery of the body, the aorta.   -Right ventricular assist devices (RVAD) help the right side of the heart pump blood to the lungs. -Bi-ventricular assist devices (BVAD) help both sides of the heart pump.  -Heartmate II and Heartware are two types of pumps we may consider.  -Total Artificial Heart (MATT) that replaces the heart and pumps blood to the body.    MCSDs can also be categorized into short and long-term therapies. Short-term devices are considered when patients are in cardiogenic shock and need help to pump blood for a few short hours to a few days/ weeks. Long-term devices are used for patients for months to years. Some patients may receive a short-term device prior to implantation of a long-term device.            When Is A VAD Used?  A VAD is used to assist the pumping action of a severely weakened heart. It works with your heart to improve and  to increase blood flow; it does not replace your own heart. When medications can  no longer help, and other surgical options have been exhausted, a physician may recommend a VAD. VADs are most often used for patients experiencing New York Heart Association (NYHA) Class III-IV heart failure symptoms.    Alternative Options:  If you are not found to be a candidate for VAD implantation or if you decide that a VAD is not the best option for you, you will continue to receive customary standard of care. You may also continue optimal medical management alone including the use of inotrope therapy. An inotrope is an IV medication that helps the strength of the hearts contraction. However, the reason that you are being considered for VAD implantation is because optimal medical management has not been adequate and without a VAD, your condition is likely to deteriorate over time. While going straight to transplant may be a possibilition, death is a possibility as well.    You May Not Be Eligible To Receive A VAD If You Are Found To Have Any Of The Following:   Any irreversible non-cardiac disease state with less than 2 year survival rate   Inadequate social support to be successful at home after surgery   Irreversible pulmonary disease or fixed pulmonary hypertension   Irreversible renal disease   Irreversible liver disease   Unresolved stroke or uncorrected cerebral vascular disease   A history of chronic noncompliance   Using illicit drugs or alcohol   Uncorrected thyroid disease   Significant right ventricular failure   Obstructive or restrictive cardiomyopathy   Amyloidosis   Active pericardial disease   Untreated aortic aneurysm   Irreversible cognitive dysfunction   History of psychiatric disease, uncontrolled affective disorder, or any cognitive dysfunction that may prevent you     from managing self-care   Diabetes with severe retinopathy or peripheral neuropathy   Obesity with BMI (body mass index) greater than 35   Severe chronic malnutrition   Uncorrected blood disorders    Active uncontrolled infection   Pregnancy (positive pregnancy test)   HIV positive or immunosuppression that could result in device infection   Muscular dystrophy, MS or similar disease states    Active systemic infection   Cancer   Insufficient funding      Possible Benefits:  The overall goal is improved health and quality of life. In most cases, because circulation has been restored as a result of the VAD, you can expect to have more energy and also experience less heart failure symptoms. Since VADs help deliver more oxygen rich blood, you may feel well enough to resume many of the usual activities and hobbies that you enjoy. In fact, many patients return to work and are no longer disabled, depending on the type of work they do. Be aware that you may lose your disability post VAD based on review of your records and disability benefits. It is important that you speak with a  so that you may plan for this should it occur.   The improved circulation may prolong life and may improve some organ damage caused by your heart failure. This is supported by some studies that have shown that VAD patients have a longer survival than patients treated with medications alone. Although the VAD can improve your chances for survival, the type and severity of your heart disease may outweigh any benefits from the device and you may still die.    Possible Risks:  As with any surgery or procedure, there are risks and the possibility of complications. There are also risks related to the operation itself and undergoing anesthesia, and risks related to the device itself. You will discuss the risks in detail with the Cardiac Surgeon who intends to perform your surgery. Below is a list of risks related to the surgery and the VAD.                                           GONSALOSNER                                  INTERMACS   Bleeding                              13.2%             13.5%   Device Malfunction                4.3 3.7%   Infection                               11.1% 15.2%   Neurological Dysfunction     2.3% 3.7%   Rehospitalization                  49.3% 32.2%   Renal Dysfunction                2.9% 2.4%   Right Heart Failure                3.1% 3.0%       Anesthesia Risks:   During the surgery you will be put under general anesthesia, which means you will be given medications to put you to sleep, block pain, and paralyze parts of your body. You will also be placed on a machine to help you breathe. The anesthesiologist will talk with you in more detail about the risks of anesthesia. At the time of surgery you will be required to sign a consent form for anesthesia.    Operative Procedure Risks:  There are many risks with this operation including but not limited to: death, heart attack, stroke, nerve injury, blood clots, damage to arteries needing limb amputation, bleeding and hemorrhage, hemolysis, infection, development of new antibodies in your blood, mediastinitis, arrhythmia, right heart failure, heart block, or the need for pacemaker or ICD implantation. Pump exchange due to complications with the pump is a possibility as well. In addition, the need for re-operation for any cause, renal, hepatic or pulmonary failure resulting in death or long-term need of ventilation or dialysis, and blood transfusion with its risk of HIV, and hepatitis. Studies have also shown that patients may have problems with memory, attention, and speed of processing thoughts after a cardiac surgical procedure. Any of these complications will be explained to you in more detail if you desire. In addition to these potential complications, there may be other risks that are currently unknown. The longer you are on the device, the greater the chances that complications will develop. It is also possible that you may reach a point where your quality of life is so impaired, that the decision to terminate your VAD-support will need to be  addressed.     VAD Therapy Risks (After The Surgery):  Include but not limited to: death, need for re-operation, device malfunction or device infection, renal failure requiring dialysis, blood clots, stroke, pain, or bleeding. Pump exchange due to complications is a possibility. Patients may also experience a potential decrease in their quality of life including limitations of their normal activities. In addition, 24-hour caregivers may experience increased stress in their day-to-day life as a result of caring for a loved one with a VAD.            Evaluation Process:  There will be many people involved in the evaluation process to assure that this is the best choice for you. You will receive a number of tests and consultations. Some of the people that may help evaluate you include Heart Failure Physicians, Cardiac Surgeons, Social Workers and VAD Coordinators. During this process, you will be given education about the VAD and the care that you would require. After the evaluation, the group will decide if you meet the criteria to have a VAD implanted. You may require or have already been implanted with a short-term VAD prior to surgery for a long-term VAD. Additional people you well meet include a financial counselor to discuss insurance and dressing supplies, research coordinators, anesthesiologist, psychiatrist/psychologist, physical therapist or occupational therapist to discuss rehabilitation after VAD, and dietician to improve nutrition. Some patients may be referred to other services for consultation. These may include, but are not limited to: infectious disease doctor, gastroenterologist, nephrologist (kidney doctor), pulmonologist (lung doctor), hepatologist (liver doctor), or an ophthalmologist (eye doctor). It is recommended that you see your dentist to ensure no infection is present and all needed dental work is completed before surgery. Cavities and rotten teeth can cause an infection which can lead to  death.    Testing is required to determine VAD candidacy. These include but are not limited to the following:   Laboratory studies of blood and urine, chest x-ray, abdominal ultrasound, CT scan, echocardiogram, cardiopulmonary treadmill, right heart catheterization, electrocardiogram, pulmonary function tests, colonoscopy or endoscopy, vascular studies, and pulmonary studies. Additionally, women will need to have an up-to-date mammogram and PAP test.     Device Choice:  VADs are currently approved by the Food and Drug Administration (FDA) to be used as Bridge to Transplantation (BTT) or Destination Therapy (DT). A full list will be provided for you and your family to review if desired. [facility] also participates in clinical trials with devices that are considered investigational, and are not yet FDA approved for BTT/DT. Your Surgeon and Cardiologist will discuss with you which device is the best option for you. VADs have four main parts: the implantable heart pump, a tube that passes through the skin of your abdomen (driveline), a controller (small computer) that controls the pump operation, and an external power source (batteries or power device). In addition, there are other VADs that are used temporarily when patients are in cardiogenic shock.    You have the right to refuse surgery at any time. This consent will help you to make an informed decision. If you choose that this is not the best option for you at this time, you may choose to be re-evaluated at a later time and you may choose to receive the implant at a later time if you are still a candidate.    Pre-operative Care expectations:   Informed surgical and anesthesia consents will be completed.   You will be admitted to the hospital a few days before the day of surgery for optimization before surgery (unless      already hospitalized).   Intra-aortic balloon pump or impella will be placed before surgery,    Status change if waiting for a  transplant and what this means.   Your ICD will be turned off prior to surgery, and then turned back on after surgery. It will NOT be removed.   ICU (intensive care unit) visiting hours FAMILY CAN NOT STAY AT NIGHT (pamphlet given to them)    Names, addresses and phone numbers of local MD, EMS with [employer] schilling, PD, Electrical company and pharmacy           will be required to be provided prior to the LVAD surgery.     Need for Coumadin and frequent blood work    Importance of medication compliance    Go over booklet/websites available    Learning the material for the device        Surgical Procedure:  The surgical procedure to implant the VAD will require open-heart surgery and can take on average between 6-12 hours. The surgeon will need to make an incision down the front of your chest to reach your heart. You will have a breathing tube (endotracheal or ET tube) and be under general anesthesia. The VAD is placed below the heart and the surgeon will connect the pump to your heart and secure it in place with sutures. Once the pump is in place, the VAD along with your natural heart will resume pumping blood through your body. After the surgery is completed, you will return to the ICU.    Post-Operative Care Expectations:  Upon arrival to the ICU, you will receive close monitoring and support from the following medical mechanisms:   Heart monitor (telemetry) to monitor heart rate and rhythm.   A breathing tube (endotracheal tube) to assist with breathing and maintain and open airway.   An oral-gastric tube will be utilized to keep the stomach empty when connected to suction, as well as to give the    nursing staff the capability to administer oral medications directly into the stomach.   A Singer catheter to measure urinary output.   A [place]en Catheter to measure pressures within the heart and lungs.   An arterial line catheter in order to measure arterial blood pressure.   Chest tubes to collect and  measure drainage from surgery.   A VAD driveline that exits the skin in the abdominal area and is connected to the VAD power source.   Temporary pacemaker wires which may aid in the event of an arrhythmia associated with heart surgery.    Your chest may be left open for 24 to 72 hours after implantation, after which you will be taken back to the operating room to close your chest.    You will receive medications for sedation and to control your pain in order to achieve a tolerable level of comfort. You will also be on IV medications until your blood pressure and fluid status are stable. Your home medications will be resumed as soon as possible if still medically relevant. In addition to your previous taken medications, patients with VADs are commonly prescribed medications for anticoagulation/anti-platelet, antibiotics, blood pressure, and vitamin/mineral supplements. Your length of stay in the ICU will depend on how fast you recover. Once you are more stable, breathing on your own with your lines and tubes out, you will be transferred to a general care unit where you can expect to stay for another 1-3 weeks. On average, your total length of stay will be about three or four weeks after your surgery. During this time, it is expected that you and your family will begin to learn to manage the device and learn how to manage your care at home. Most patients are able to return home after VAD implantation, but this cannot be guaranteed. Complications may require a prolonged period of hospitalization, long term acute care facility, or inpatient rehabilitation stay locally.  If you are unattended and the device fails, you may not be able to perform the emergency procedures yourself, which could result in death and/or blood clots in the device.    Education:  Verbal, written, and visual educational materials are provided throughout your hospitalization and are available to anyone involved in your care at home. You and your  caregiver(s) will be trained by a VAD Coordinator on how to manage your care and device. Other staff such as your bedside Nurse, the Occupational and Physical Therapists will also provide training to you. You and your caregiver(s) must show ability to manage the device, understand how it operates, troubleshoot problems, and care for your driveline exit site. It is expected that a caregiver(s) will be present and available while you are in the hospital to learn how to manage the device and how to care for you when you are at home. The education will be an ongoing process while you are here at the hospital. Near the time of your expected discharge, your family and/or caregivers will be required to show competency in the care and management of you and your VAD. Once it has been determined that you and your caregiver(s) are competent in your VAD care, you will participate in an excursion around the hospital with your caregiver for 6 hours. In addition, a VAD Coordinator will ensure that your local fire department, emergency personnel, and any other community members will be given education materials and training as necessary. Your home must have consistent electricity and phone services; the outlets must be three pronged and grounded. Any additional safety needs are arranged during this time. You will need to have your glasses and hearing aids at the hospital in order to be educated, as soon as possible.   Caregiver Requirements:  Both before and after VAD implantation, a caregiver/support person MUST be able to attend comprehensive education sessions conducted by the VAD coordinators. You MUST come at a specified time that is agreed upon by the caregiver/support person and the VAD coordinator. We respectfully request this to be between 9am and 4pm on Monday to Friday. These are not scheduled sessions, but rather constant education to the caregiver and patient. If there are multiple caregiver/support people that need to  learn the device, they need to all be present at the SAME TIME for education sessions. There will be only ONE designated caregiver to learn the dressing changes. This caregiver MUST perform at least FIVE dressing changes before they are considered competent to change a dressing by themselves. If patient unable to be checked off before discharge, they may need 24 hour care.    Discharge Process:  Your daily progress will be followed by a team of people involved in your care including your Surgeon, Cardiologist, VAD Coordinators, Staff Nurses, Nurse Practitioners, Physician Assistants, Physical/Occupational Therapy, Social Workers, and a Discharge Planner. They will monitor your recovery and help you to adjust to life with a VAD. You must have a thermometer, weight scale, flashlight, and a blood pressure cuff at your home. Soon after your surgery, it will be very important to begin preparing for your discharge. You will have to be both physically recovered and show competence in the management of your VAD to be discharged. Most patients return home after successful outings; however, some patients choose to live with a caregiver or need a rehabilitation facility for a short period before returning home. If insurance allows, a Home Health nurse will be recommended to come to your home for two weeks and assist you in your care when you return home. The length of time that the Visiting Nurse will come to your home will depend on your overall recovery. It is recommended after you return home that you enroll in a Cardiac Rehab program to continue to improve your physical health.    Follow up care:  After you are discharged, you will follow up with your Surgeon, your Heart Failure Cardiologist and your VAD coordinator. They will collaboratively care for you and make decisions about your treatment. Typically, your first visit will be 1 week after discharge. We will see you every week for 3-4 weeks, followed by every 2 weeks  for 1 month, then monthly thereafter while you have the VAD. You may be required to stay close to our medical center depending on how things are going. Once you are considered a stable established patient, your Physicians may decide that you can follow-up every 2-3 months. Along with seeing your Cardiologist and Surgeon, you will have laboratory testing, and other physiological testing done on a regular basis in order to monitor and maintain your progress and health. The types of testing that you may need and the frequency will be decided by your Physicians but can include blood tests, EKG, Echocardiogram, Right Heart Catheterization, V02 Treadmill Stress Test, and Implantable Cardioverter Defibrillator (ICD) device check. If you have received an investigational VAD, you will have other testing that will be required for the research study. Many VADs require patients to take anticoagulation medications, also known as blood thinning medications (coumadin, warfarin). You will be required to have frequent blood draws, up to 2-3 times a week, in order to monitor your blood count and blood thinning agents. You will also be in frequent contact with a VAD Coordinator who will make phone calls to assess how you are doing at home and assist you with any problems that may arise. A VAD Coordinator and a Heart Failure Cardiologist are also available 24 hours a day in the event that you have an emergency. On average, you can expect that within 12 weeks after surgery, you will be able to return to most activities, with the permission of your VAD Team.    Lifestyle Changes:  You will have few limitations and can resume most usual activities. Certain activities are hazardous or fatal after implant. Persons with implantable VADs must not allow their controller/computer and electrical equipment to submerge in water. Showering is possible with proper protective equipment. You may only resume showering once your driveline has healed  and your VAD coordinator gives their permission. Swimming and baths are prohibited. You cannot sleep on your stomach or the side the driveline is exiting your abdomen. Contact sports, repetitive jumping, or impact with an airbag are examples of activities that may cause trauma to the pump attachments and must be avoided.         You may be sexually active but must care for your driveline. Female patients cannot get pregnant. Medical care after implant includes lifetime follow up to monitor device function and health status. You may not have a magnetic resonance imaging (MRI) test because of the magnetic fields. You may not vacuum, touch television or computer screens, or take hot clothes out of the dryer due to the static electricity. VAD therapy requires significant self-care responsibility and a willingness to participate with you VAD team. Driveline exit site dressings must be performed daily using sterile technique or as directed by the VAD team. Maintenance care of the device components, batteries, and driveline is necessary to prevent pump failure, infections, or other serious complications. You will continue to take medications for your heart failure although the dose and or medication name may change. You may continue to take your diuretic such as lasix or demedex. You may also continue to be on a fluid restriction. You may drive once approved by your VAD team. You may be able to travel with your VAD, depending on the situation.      VAD Equipment:  Along with the device that is implanted inside your body, you will have a number of other external pieces of equipment that will require care and maintenance. You will have a driveline that exits your body through your abdomen that will power a controller, which is the computer component that tells the heart pump how to perform. The controller will also tell you about alarms, sounds, and words, on how your pump is operating and if there are any problems. In  order to power the device and the controller, you will have batteries and a battery charger and/or power device. The batteries allow you to be mobile and move freely without being attached to outlet power. The  or power device allows you to be connected to power for long periods of time such as when you are sleeping. Different MCSDs have similar pieces of equipment, but will vary depending on the device you receive. You will receive education and teaching before you leave the hospital to make sure you understand clearly how to operate the equipment and troubleshoot problems that may occur.    Confidentiality:  Hospital personnel who are involved in the course of your care may review your medical record. Without your authorization communication between you and [facility] remains confidential. If you do become a candidate for transplant, data about your case, which will include your identity, will be sent to [employer] ([employer]) and may be sent to other places involved in the transplant process as permitted by law. Data about your VAD, which will include your identity, will be shared with the  of the device. Your information may also be entered into a registry for pump implants, known as INTERMACS. Your participation in this is voluntary, and will be discussed at greater length prior to implant.    End of Life:  If you are approaching the end of life, the VAD can be turned off. You may be in a hospital, home, or hospice setting. If you become very sick and do not have a chance to survive, we may talk about stopping the pump. The doctor would talk to you or your family about what is right for you. It is helpful to talk to your family about your goals before surgery so they know what your wishes are. A member of our Goals of Care team will visit you before surgery to help you learn your goals. You have the right to have the device turned off or to decline pump exchange if your pump  fails or malfunctions.     Device Return:  At the time of either transplant or death, the surgeon may need to remove the device to return to the . You or a family member may need to sign a separate consent for removal of the device.

## 2017-07-07 NOTE — CONSULTS
"  [facility]  Adult Nutrition  Consult Note    SUMMARY     Recommendations    Recommendation/Intervention: 1.Continue with current diet. 2 Encourage Good PO intake of HVP 3. PO intake >75% EEN/EPN 4. RD to follow  Goals: po intake >/= 75% EEN/EPN  Nutrition Goal Status: new  Communication of RD Recs: discussed on rounds    Reason for Assessment    Reason for Assessment: physician consult  Diagnosis:  ( Acute on chronic combined systolic and diastolic heart fail)  Relevant Medical History: AICD,elevated Trig.   Interdisciplinary Rounds: attended  General Information Comments: pt out of Rm for Procedure, spoke to wife. Educated wife on Heart healthy diet and sodium restrictions. wife asked several questions and  took good notes.       Nutrition Prescription Ordered    Current Diet Order: cardiac       Evaluation of Received Nutrients/Fluid Intake    Intake/Output Summary (Last 24 hours) at 07/07/17 1511  Last data filed at 07/07/17 1400   Gross per 24 hour   Intake            703.4 ml   Output             1200 ml   Net           -496.6 ml      % Intake of Estimated Energy Needs: 75 - 100 %  % Meal Intake: 100%    Nutrition Risk Screen     Nutrition Risk Screen: no indicators present    Nutrition/Diet History    Patient Reported Diet/Restrictions/Preferences: general, low salt (per wife)  Typical Food/Fluid Intake: Excessive PTA  Food Preferences: No  Advent or  cultural preferences       Labs/Tests/Procedures/Meds    Pertinent Labs Reviewed: reviewed  Pertinent Labs Comments: Na- 135, Glu-66, Alb-3.0, PreAlb-18, CRP-31  Pertinent Medications Reviewed: reviewed  Pertinent Medications Comments: dobutamine, Lasix, heparin, KCl,     Physical Findings    Overall Physical Appearance: obese, nourished  Tubes:  (Midline cath. PICC)  Oral/Mouth Cavity: all teeth present (age appropriate)  Skin: intact, edema    Anthropometrics    Temp: 97.9 °F (36.6 °C)  Height: 5' 8" (172.7 cm)  Weight Method: Standard " Scale  Weight: 83.8 kg (184 lb 11.9 oz)  Ideal Body Weight (IBW), Male: 154 lb  % Ideal Body Weight, Male (lb): 135.86 lb  BMI (Calculated): 31.9  BMI Grade: 30 - 34.9- obesity - grade I           Estimated/Assessed Needs    Weight Used For Calorie Calculations: 83.8 kg (184 lb 11.9 oz)   Height (cm): 172 cm  Energy Calorie Requirements (kcal): 1984  Energy Need Method: [place]-St Jeor (x 1.25 (PAL) 1984kcal)  RMR ([place]-St. Jeor Equation): 1587.5  Weight Used For Protein Calculations: 83.8 kg (184 lb 11.9 oz)  Protein Requirements: 84-101g/d  (1-1.2g/kg)  Fluid Need Method: RDA Method (1ml/ kcal or per MD)   RDA Method (mL): 1984               Assessment and Plan    * Acute on chronic combined systolic and diastolic heart failure    Nutrition Problem:   Increased nutrient needs    Etiology/Related to:   physiological need    As Evidence By:   HF, LVAD/OH, Transplant x w/u.    Nutrition Diagnosis Status:   Continues      Plan:  1. Educated pt wife on LVAD wx material, will follow-up with pt this PM              Monitor and Evaluation    Food and Nutrient Intake: energy intake, food and beverage intake  Food and Nutrient Administration: diet order     Physical Activity and Function: nutrition-related ADLs and IADLs  Anthropometric Measurements: weight, weight change  Biochemical Data, Medical Tests and Procedures:  (all labs)  Nutrition-Focused Physical Findings: overall appearance    Nutrition Risk    Level of Risk:  (f/u 1x week)    Nutrition Follow-Up    RD Follow-up?: Yes

## 2017-07-07 NOTE — PHYSICIAN QUERY
PT Name: Suman Hayden  MR #: [id number]  Physician Query Form - CKD Clarification     CDS/: Sergio Josue Jr, RN             Contact information:[phone]  This form is a permanent document in the medical record.     Query Date: July 7, 2017    By submitting this query, we are merely seeking further clarification of documentation. Please utilize your independent clinical judgment when addressing the question(s) below.    The Medical record contains the following:     Indicators   Supporting Clinical Findings   Location in Medical Record   x CKD or Chronic Kidney (Renal) Failure / Disease Mr. Hayden is a 67 y.o. year old male who has a PMHx of HTN, HLD, CKD    Renal/:   Chronic Renal Disease 7/6 Anesthesia Pre Procedure Note    7/6 Anesthesia Pre Procedure Note   x BUN/Creatinine                          GFR BUN=23  Creatinine=1.7  GFR=47.2 7/3 Labs  7/3 Labs  7/3 Labs    Dehydration      Nausea / Vomiting      Dialysis / CRRT      Medication      Treatment     x Other Chronic Conditions  Hypertensive heart disease with heart failure   7/3 H&P    Other       Provider, please further specify the stage of CKD.      [  ] Chronic Kidney Disease (CKD) (please specify stage* below)       [employer] Definitions  Stage Description  eGFR (mL/min)   [  ]     II Mildly reduced kidney function 60-89   [ X ]     III Moderately reduced kidney function 30-59     [  ] Other (please specify): ________________________  [  ] Clinically Undetermined    Please document in your progress notes daily for the duration of treatment until resolved and include in your discharge summary.

## 2017-07-07 NOTE — PROGRESS NOTES
Patient tolerated the procedure well. Please see complete RHC procedure note for full details and results. Stable to return from cath lab to their hospital room.  Procedure: Right heart catheterization   Procedure date: 07/07/17    Discharge date: 07/07/17  Estimated blood loss: less than 10 cc  Complications: none  Condition at discharge: stable  Discharge instructions: no heavy lifting greater than 5 lbs and no bearing down/coughing without holding pressure over incision site.

## 2017-07-07 NOTE — PROGRESS NOTES
D/C note:    SW to pt's room for D/C. Pt out of room for testing. Pt's wife aaox4, pleasant, and communicative. Pt's wife reports she and pt are coping well at this time. Pt's wife reports she and pt are agreeable to D/C this weekend with IV  and HH. Pt's wife reports still agreeable to [facility] ([phone]) for IV . Shira with [facility] notified of possibility of weekend D/C. Pt's wife reports she and pt would now prefer [facility] ([phone]) over [facility]. SW canceled referral with [facility] and arranged [facility] through Dorie with [facility] .  Pt's wife with questions about pt receiving coverage with Medicaid. SW explained pt will likely not qualify for Medicaid due to pt's income, however encouraged pt's wife to apply for Medicaid online if she interested in determining pt's eligibility. Pt's wife reports plan to do the same. Pt's wife to provide transport home at d/c. Pt's wife reports no other questions or concerns at this time. SW providing psychosocial counseling and support, education, assistance, resources, and D/C planning as indicated. SW remains available.

## 2017-07-07 NOTE — INTERVAL H&P NOTE
The patient has been examined and the H&P has been reviewed:    I concur with the findings and changes have been noted since the H&P was written:      Patient had a RHC earlier this week which showed elevated filling pressures with reduced CO/CI. He was admitted for diuresis and IV Dobutamine infusion started. PICC line inserted. CTS consulted and advanced HF Rx options work up ongoing. Over the past 4 days he has had a good diuresis with a [place] negative I/[place] 38561 cc. He is able to sleep flat on bed and ambulating well without any discomfort. No JVD, pedal edema. Lower extremities warm. Lungs clear.  RHC scheduled to assess CO and filling pressures on .    Attending Attestation to follow.    Reyes Valdez MD  [phone].    Active Hospital Problems    Diagnosis  POA    *Acute on chronic combined systolic and diastolic heart failure [I50.43]  Yes    Elevated PSA [R97.20]  Unknown    HTN (hypertension) [I10]  Unknown    Hyperlipidemia [E78.5]  Unknown    V-tach [I47.2]  Unknown    Nonischemic cardiomyopathy [I42.8]  Yes     Per Dr Pearl records    LVEDD 6.2 / LVEF 25 May 2017        Resolved Hospital Problems    Diagnosis Date Resolved POA    Acute on chronic systolic heart failure [I50.23] 07/04/2017 Yes    Hypertensive heart disease with heart failure [I11.0] 07/04/2017 Yes

## 2017-07-07 NOTE — ASSESSMENT & PLAN NOTE
-NICM; Last 2D Echo 07/3/17: LVEF 10-15%, LVEDD 6.7 cm  -Diuresed with Lasix infusion at 10mg/hr; decreased to 5 and transitioned to IVP.  Net negative 291cc in the last 24 hours.  Will change to oral Lasix today and await resulted of RHC   -GDMT with Spironolactone, Sacubitril-Valsartan stopped 7/5  -Patient is currently being evaluated for OHTx/VAD  -Anticipate presentation at selection next Wednesday  -Pending results of RHC; anticipate discharge over the weekend on Dobutamine. (home health orders are done)  -2g Na dietary restriction, 1500 mL fluid restriction, strict I/Os

## 2017-07-07 NOTE — PLAN OF CARE
Problem: Patient Care Overview  Goal: Plan of Care Review  Plan of care discussed with patient. Patient is free of fall/trauma/injury. Denies CP, SOB, or pain/discomfort. Transitioned to PO lasix today. Right heart cath scheduled for today. Dobutamine drip infusing. All questions addressed. Will continue to monitor

## 2017-07-07 NOTE — PLAN OF CARE
Problem: Patient Care Overview  Goal: Plan of Care Review  Outcome: Outcome(s) achieved Date Met: 07/07/17  Recommendations     Recommendation/Intervention: 1.Continue with current diet. 2 Encourage Good PO intake of HVP 3. PO intake >75% EEN/EPN 4. RD to follow  Goals: po intake >/= 75% EEN/EPN  Nutrition Goal Status: new  Communication of RD Recs: discussed on rounds     Reason for Assessment     Reason for Assessment: physician consult  Diagnosis:  ( Acute on chronic combined systolic and diastolic heart fail)  Relevant Medical History: AICD,elevated Trig.   Interdisciplinary Rounds: attended  General Information Comments: pt out of Rm for Procedure, spoke to wife. Educated wife on Heart healthy diet and sodium restrictions. wife asked several questions and  took good notes.

## 2017-07-07 NOTE — ASSESSMENT & PLAN NOTE
-Appreciate Urology consultation: not overly concerning for malignancy. Recommending outpatient follow up

## 2017-07-08 LAB
ALBUMIN SERPL BCP-MCNC: 3 G/DL
ALP SERPL-CCNC: 53 U/L
ALT SERPL W/O P-5'-P-CCNC: 11 U/L
ANION GAP SERPL CALC-SCNC: 12 MMOL/L
ANISOCYTOSIS BLD QL SMEAR: SLIGHT
AST SERPL-CCNC: 16 U/L
AT III AG ACT/NOR PPP IA: 56 %
BASOPHILS # BLD AUTO: 0.02 K/UL
BASOPHILS NFR BLD: 0.3 %
BILIRUB DIRECT SERPL-MCNC: 1.6 MG/DL
BILIRUB SERPL-MCNC: 2.8 MG/DL
BNP SERPL-MCNC: 1314 PG/ML
BUN SERPL-MCNC: 15 MG/DL
BURR CELLS BLD QL SMEAR: ABNORMAL
CALCIUM SERPL-MCNC: 9.1 MG/DL
CHLORIDE SERPL-SCNC: 96 MMOL/L
CO2 SERPL-SCNC: 28 MMOL/L
COTININE SERPL-MCNC: <3 NG/ML
CREAT SERPL-MCNC: 1.1 MG/DL
DIFFERENTIAL METHOD: ABNORMAL
EOSINOPHIL # BLD AUTO: 0.3 K/UL
EOSINOPHIL NFR BLD: 4.4 %
ERYTHROCYTE [DISTWIDTH] IN BLOOD BY AUTOMATED COUNT: 17 %
EST. GFR  (AFRICAN AMERICAN): >60 ML/MIN/1.73 M^2
EST. GFR  (NON AFRICAN AMERICAN): >60 ML/MIN/1.73 M^2
GLUCOSE SERPL-MCNC: 69 MG/DL
HCT VFR BLD AUTO: 40.3 %
HGB BLD-MCNC: 13.4 G/DL
LYMPHOCYTES # BLD AUTO: 1.6 K/UL
LYMPHOCYTES NFR BLD: 25.4 %
MCH RBC QN AUTO: 28.5 PG
MCHC RBC AUTO-ENTMCNC: 33.3 %
MCV RBC AUTO: 86 FL
MONOCYTES # BLD AUTO: 1 K/UL
MONOCYTES NFR BLD: 15.6 %
NEUTROPHILS # BLD AUTO: 3.4 K/UL
NEUTROPHILS NFR BLD: 54.3 %
NICOTINE SERPL-MCNC: <3 NG/ML
PLATELET # BLD AUTO: 115 K/UL
PLATELET BLD QL SMEAR: ABNORMAL
PMV BLD AUTO: 12.5 FL
POIKILOCYTOSIS BLD QL SMEAR: SLIGHT
POTASSIUM SERPL-SCNC: 3.8 MMOL/L
PROT SERPL-MCNC: 6.3 G/DL
RBC # BLD AUTO: 4.7 M/UL
SODIUM SERPL-SCNC: 136 MMOL/L
VWF AG ACT/NOR PPP IA: 207 %
VWF:AC ACT/NOR PPP IA: 197 %
WBC # BLD AUTO: 6.34 K/UL

## 2017-07-08 PROCEDURE — 85025 COMPLETE CBC W/AUTO DIFF WBC: CPT | Mod: NTX

## 2017-07-08 PROCEDURE — 80048 BASIC METABOLIC PNL TOTAL CA: CPT | Mod: NTX

## 2017-07-08 PROCEDURE — 63600175 PHARM REV CODE 636 W HCPCS: Mod: NTX | Performed by: INTERNAL MEDICINE

## 2017-07-08 PROCEDURE — 99232 SBSQ HOSP IP/OBS MODERATE 35: CPT | Mod: NTX,,, | Performed by: INTERNAL MEDICINE

## 2017-07-08 PROCEDURE — 20600001 HC STEP DOWN PRIVATE ROOM: Mod: NTX

## 2017-07-08 PROCEDURE — 25000003 PHARM REV CODE 250: Mod: NTX | Performed by: INTERNAL MEDICINE

## 2017-07-08 PROCEDURE — 25000003 PHARM REV CODE 250: Mod: NTX | Performed by: NURSE PRACTITIONER

## 2017-07-08 PROCEDURE — 25000003 PHARM REV CODE 250: Mod: NTX | Performed by: PHYSICIAN ASSISTANT

## 2017-07-08 PROCEDURE — 83880 ASSAY OF NATRIURETIC PEPTIDE: CPT | Mod: NTX

## 2017-07-08 PROCEDURE — 80076 HEPATIC FUNCTION PANEL: CPT | Mod: NTX

## 2017-07-08 RX ADMIN — HEPARIN SODIUM 5000 UNITS: 5000 INJECTION, SOLUTION INTRAVENOUS; SUBCUTANEOUS at 01:07

## 2017-07-08 RX ADMIN — HEPARIN SODIUM 5000 UNITS: 5000 INJECTION, SOLUTION INTRAVENOUS; SUBCUTANEOUS at 09:07

## 2017-07-08 RX ADMIN — FUROSEMIDE 40 MG: 40 TABLET ORAL at 05:07

## 2017-07-08 RX ADMIN — Medication 10 ML: at 01:07

## 2017-07-08 RX ADMIN — Medication 10 ML: at 06:07

## 2017-07-08 RX ADMIN — Medication 3 ML: at 10:07

## 2017-07-08 RX ADMIN — PRAVASTATIN SODIUM 20 MG: 20 TABLET ORAL at 09:07

## 2017-07-08 RX ADMIN — FUROSEMIDE 40 MG: 40 TABLET ORAL at 08:07

## 2017-07-08 RX ADMIN — ASPIRIN 81 MG CHEWABLE TABLET 81 MG: 81 TABLET CHEWABLE at 08:07

## 2017-07-08 RX ADMIN — DOBUTAMINE HYDROCHLORIDE 5 MCG/KG/MIN: 400 INJECTION INTRAVENOUS at 05:07

## 2017-07-08 RX ADMIN — HEPARIN SODIUM 5000 UNITS: 5000 INJECTION, SOLUTION INTRAVENOUS; SUBCUTANEOUS at 05:07

## 2017-07-08 RX ADMIN — SPIRONOLACTONE 25 MG: 25 TABLET, FILM COATED ORAL at 08:07

## 2017-07-08 NOTE — PLAN OF CARE
Problem: Patient Care Overview  Goal: Plan of Care Review  Outcome: Ongoing (interventions implemented as appropriate)  Patient remains free of falls and injury. Patient denies pain. Continued on doubutamine drip. PICC line places 7/7/2017. Continue LVAD work up. Plan to D/C home with home health and dobutamine this weekend. Plan of care reviewed with patient and spouse.

## 2017-07-08 NOTE — SUBJECTIVE & OBJECTIVE
Interval History:   Continued on doubutamine drip. PICC line places 7/7/2017. Continue LVAD work up. Plan to D/C home with home health and dobutamine this weekend.     Continuous Infusions:   DOBUTamine 5 mcg/kg/min (07/08/17 0507)     Scheduled Meds:   aspirin  81 mg Oral Daily    furosemide  40 mg Oral BID    heparin (porcine)  5,000 Units Subcutaneous Q8H    pravastatin  20 mg Oral QHS    sodium chloride 0.9%  10 mL Intravenous Q6H    sodium chloride 0.9%  3 mL Intravenous Q8H    spironolactone  25 mg Oral Daily     PRN Meds:Flushing PICC Protocol **AND** sodium chloride 0.9% **AND** sodium chloride 0.9%    Review of patient's allergies indicates:  No Known Allergies  Objective:     Vital Signs (Most Recent):  Temp: 98 °F (36.7 °C) (07/08/17 0600)  Pulse: (!) 58 (07/08/17 0600)  Resp: 18 (07/08/17 0600)  BP: (!) 127/56 (07/08/17 0600)  SpO2: 98 % (07/08/17 0600) Vital Signs (24h Range):  Temp:  [97.9 °F (36.6 °C)-98.7 °F (37.1 °C)] 98 °F (36.7 °C)  Pulse:  [58-63] 58  Resp:  [16-18] 18  SpO2:  [94 %-98 %] 98 %  BP: ()/(51-60) 127/56     Weight: 83.6 kg (184 lb 4.9 oz)  Body mass index is 28.02 kg/m².      Intake/Output Summary (Last 24 hours) at 07/08/17 0659  Last data filed at 07/08/17 0600   Gross per 24 hour   Intake              600 ml   Output             1100 ml   Net             -500 ml       Hemodynamic Parameters:       Physical Exam   Constitutional: He is oriented to person, place, and time. He appears well-developed and well-nourished.   Neck: Normal range of motion. Neck supple. JVD present.   Just above the clavicle    Cardiovascular: Normal rate and regular rhythm.  Exam reveals no gallop and no friction rub.    No murmur heard.  Intermittent S3   Pulmonary/Chest: Effort normal and breath sounds normal. He has no wheezes. He has no rales.   Abdominal: Soft. Bowel sounds are normal. There is no tenderness.   Musculoskeletal: He exhibits no edema.   Neurological: He is alert and  oriented to person, place, and time.   Skin: Skin is warm and dry.       Significant Labs:  CBC:    Recent Labs  Lab 07/07/17  0908 07/08/17  0518   WBC 6.11 6.34   RBC 4.99 4.70   HGB 14.4 13.4*   HCT 42.4 40.3   *  --    MCV 85 86   MCH 28.9 28.5   MCHC 34.0 33.3     BNP:    Recent Labs  Lab 07/06/17  0428   BNP 2,045*     CMP:    Recent Labs  Lab 07/08/17  0516   GLU 69*   CALCIUM 9.1   ALBUMIN 3.0*   PROT 6.3      K 3.8   CO2 28   CL 96   BUN 15   CREATININE 1.1   ALKPHOS 53*   ALT 11   AST 16   BILITOT 2.8*      Coagulation:     Recent Labs  Lab 07/07/17  0458   INR 1.1   APTT 28.3     LDH:    Recent Labs  Lab 07/06/17  0932        Microbiology:  Microbiology Results (last 7 days)     ** No results found for the last 168 hours. **

## 2017-07-08 NOTE — CONSULTS
[facility]  Infectious Disease  Consult Note    Patient Name: Suman Hayden  MRN: [id number]  Admission Date: 7/3/2017  Hospital Length of Stay: 5 days  Attending Physician: Ledy Leos MD  Primary Care Provider: Joe Ernst MD     Isolation Status: No active isolations      Inpatient consult to Infectious Diseases  Consult performed by: ROWENA BETH  Consult ordered by: LEDY LEOS Consult for pre-heart transplant/LVAD evaluation acknowledged.   Full consult and recommendations to follow.   In the interim, please call for any immediate concerns.     Thank you.   YURI Butler, ANP-C  [phone] pager,  [id number] [phone]

## 2017-07-08 NOTE — PROGRESS NOTES
[facility]  Heart Transplant  Progress Note    Patient Name: Suman Hayden  MRN: [id number]  Admission Date: 7/3/2017  Hospital Length of Stay: 5 days  Attending Physician: Ortega Leos MD  Primary Care Provider: Joe Ernst MD  Principal Problem:Acute on chronic combined systolic and diastolic heart failure    Subjective:     Interval History:   Continued on doubutamine drip. PICC line places 7/7/2017. Continue LVAD work up. Plan to D/C home with home health and dobutamine this weekend.     Continuous Infusions:   DOBUTamine 5 mcg/kg/min (07/08/17 0507)     Scheduled Meds:   aspirin  81 mg Oral Daily    furosemide  40 mg Oral BID    heparin (porcine)  5,000 Units Subcutaneous Q8H    pravastatin  20 mg Oral QHS    sodium chloride 0.9%  10 mL Intravenous Q6H    sodium chloride 0.9%  3 mL Intravenous Q8H    spironolactone  25 mg Oral Daily     PRN Meds:Flushing PICC Protocol **AND** sodium chloride 0.9% **AND** sodium chloride 0.9%    Review of patient's allergies indicates:  No Known Allergies  Objective:     Vital Signs (Most Recent):  Temp: 98 °F (36.7 °C) (07/08/17 0600)  Pulse: (!) 58 (07/08/17 0600)  Resp: 18 (07/08/17 0600)  BP: (!) 127/56 (07/08/17 0600)  SpO2: 98 % (07/08/17 0600) Vital Signs (24h Range):  Temp:  [97.9 °F (36.6 °C)-98.7 °F (37.1 °C)] 98 °F (36.7 °C)  Pulse:  [58-63] 58  Resp:  [16-18] 18  SpO2:  [94 %-98 %] 98 %  BP: ()/(51-60) 127/56     Weight: 83.6 kg (184 lb 4.9 oz)  Body mass index is 28.02 kg/m².      Intake/Output Summary (Last 24 hours) at 07/08/17 0659  Last data filed at 07/08/17 0600   Gross per 24 hour   Intake              600 ml   Output             1100 ml   Net             -500 ml       Hemodynamic Parameters:       Physical Exam   Constitutional: He is oriented to person, place, and time. He appears well-developed and well-nourished.   Neck: Normal range of motion. Neck supple. JVD present.   Just above the clavicle    Cardiovascular:  Normal rate and regular rhythm.  Exam reveals no gallop and no friction rub.    No murmur heard.  Intermittent S3   Pulmonary/Chest: Effort normal and breath sounds normal. He has no wheezes. He has no rales.   Abdominal: Soft. Bowel sounds are normal. There is no tenderness.   Musculoskeletal: He exhibits no edema.   Neurological: He is alert and oriented to person, place, and time.   Skin: Skin is warm and dry.       Significant Labs:  CBC:    Recent Labs  Lab 07/07/17  0908 07/08/17  0518   WBC 6.11 6.34   RBC 4.99 4.70   HGB 14.4 13.4*   HCT 42.4 40.3   *  --    MCV 85 86   MCH 28.9 28.5   MCHC 34.0 33.3     BNP:    Recent Labs  Lab 07/06/17  0428   BNP 2,045*     CMP:    Recent Labs  Lab 07/08/17  0516   GLU 69*   CALCIUM 9.1   ALBUMIN 3.0*   PROT 6.3      K 3.8   CO2 28   CL 96   BUN 15   CREATININE 1.1   ALKPHOS 53*   ALT 11   AST 16   BILITOT 2.8*      Coagulation:     Recent Labs  Lab 07/07/17  0458   INR 1.1   APTT 28.3     LDH:    Recent Labs  Lab 07/06/17  0932        Microbiology:  Microbiology Results (last 7 days)     ** No results found for the last 168 hours. **            Assessment and Plan:     Hepatitis B core antibody positive    -Hepatitis B viral load ordered and pending        Elevated PSA    -Appreciate Urology consultation: not overly concerning for malignancy. Recommending outpatient follow up        V-tach    -reported hx of V-tach.  -ICD in place  -Amiodarone stopped this admission; device interrogation pending        Hyperlipidemia    -will continue home dose of Pravastatin         HTN (hypertension)    -Blood pressure controlled over the last 24 hours  -BP medications include Spironolactone  -Will continue current regimen.          * Acute on chronic combined systolic and diastolic heart failure    -NICM; Last 2D Echo 07/3/17: LVEF 10-15%, LVEDD 6.7 cm  -Diuresed with Lasix infusion at 10mg/hr; decreased to 5 and transitioned to IVP.  Net negative 1L in the last 24  hours.    -Continue with oral Lasix today   - RHC results showed PCWP 24; CVP 5 and CI 1.9; PA 64/17 (34)  -GDMT with Spironolactone, Sacubitril-Valsartan stopped 7/5  -Patient is currently being evaluated for OHTx/VAD  -Anticipate presentation at selection next Wednesday  -Anticipate discharge over the weekend on Dobutamine. (home health orders are done)  -2g Na dietary restriction, 1500 mL fluid restriction, strict I/Os              Karri Ibanez MD  Heart Transplant  [facility]

## 2017-07-08 NOTE — PLAN OF CARE
Problem: Patient Care Overview  Goal: Plan of Care Review  Plan of care discussed with patient. Patient is free of fall/trauma/injury. Denies CP, SOB, or pain/discomfort. Dobutamine drip infusing. lvad workup in progress. All questions addressed. Will continue to monitor

## 2017-07-09 ENCOUNTER — TELEPHONE (OUTPATIENT)
Dept: TRANSPLANT | Facility: HOSPITAL | Age: 67
End: 2017-07-09

## 2017-07-09 VITALS
TEMPERATURE: 98 F | OXYGEN SATURATION: 95 % | WEIGHT: 182.56 LBS | SYSTOLIC BLOOD PRESSURE: 113 MMHG | RESPIRATION RATE: 16 BRPM | BODY MASS INDEX: 27.67 KG/M2 | HEIGHT: 68 IN | HEART RATE: 60 BPM | DIASTOLIC BLOOD PRESSURE: 57 MMHG

## 2017-07-09 LAB
ALBUMIN SERPL BCP-MCNC: 3.1 G/DL
ALP SERPL-CCNC: 56 U/L
ALT SERPL W/O P-5'-P-CCNC: 14 U/L
ANION GAP SERPL CALC-SCNC: 9 MMOL/L
AST SERPL-CCNC: 19 U/L
BASOPHILS # BLD AUTO: 0.02 K/UL
BASOPHILS NFR BLD: 0.4 %
BILIRUB DIRECT SERPL-MCNC: 1.4 MG/DL
BILIRUB SERPL-MCNC: 2.5 MG/DL
BUN SERPL-MCNC: 15 MG/DL
CALCIUM SERPL-MCNC: 9.2 MG/DL
CHLORIDE SERPL-SCNC: 97 MMOL/L
CO2 SERPL-SCNC: 29 MMOL/L
CREAT SERPL-MCNC: 1.1 MG/DL
DIFFERENTIAL METHOD: ABNORMAL
EOSINOPHIL # BLD AUTO: 0.4 K/UL
EOSINOPHIL NFR BLD: 6.7 %
ERYTHROCYTE [DISTWIDTH] IN BLOOD BY AUTOMATED COUNT: 16.9 %
EST. GFR  (AFRICAN AMERICAN): >60 ML/MIN/1.73 M^2
EST. GFR  (NON AFRICAN AMERICAN): >60 ML/MIN/1.73 M^2
GLUCOSE SERPL-MCNC: 72 MG/DL
HCT VFR BLD AUTO: 38.5 %
HGB BLD-MCNC: 13 G/DL
LYMPHOCYTES # BLD AUTO: 1.6 K/UL
LYMPHOCYTES NFR BLD: 30.9 %
MCH RBC QN AUTO: 28.7 PG
MCHC RBC AUTO-ENTMCNC: 33.8 %
MCV RBC AUTO: 85 FL
MONOCYTES # BLD AUTO: 0.8 K/UL
MONOCYTES NFR BLD: 15.5 %
NEUTROPHILS # BLD AUTO: 2.4 K/UL
NEUTROPHILS NFR BLD: 46.5 %
PLATELET # BLD AUTO: 116 K/UL
PMV BLD AUTO: 11.3 FL
POTASSIUM SERPL-SCNC: 3.8 MMOL/L
PROT SERPL-MCNC: 6.6 G/DL
RBC # BLD AUTO: 4.53 M/UL
SODIUM SERPL-SCNC: 135 MMOL/L
WBC # BLD AUTO: 5.24 K/UL

## 2017-07-09 PROCEDURE — 80076 HEPATIC FUNCTION PANEL: CPT | Mod: NTX

## 2017-07-09 PROCEDURE — 99238 HOSP IP/OBS DSCHRG MGMT 30/<: CPT | Mod: NTX,,, | Performed by: INTERNAL MEDICINE

## 2017-07-09 PROCEDURE — 80048 BASIC METABOLIC PNL TOTAL CA: CPT | Mod: NTX

## 2017-07-09 PROCEDURE — 25000003 PHARM REV CODE 250: Mod: NTX | Performed by: PHYSICIAN ASSISTANT

## 2017-07-09 PROCEDURE — 85025 COMPLETE CBC W/AUTO DIFF WBC: CPT | Mod: NTX

## 2017-07-09 PROCEDURE — 25000003 PHARM REV CODE 250: Mod: NTX | Performed by: NURSE PRACTITIONER

## 2017-07-09 PROCEDURE — 25000003 PHARM REV CODE 250: Mod: NTX | Performed by: INTERNAL MEDICINE

## 2017-07-09 RX ORDER — FUROSEMIDE 40 MG/1
40 TABLET ORAL 2 TIMES DAILY
Qty: 180 TABLET | Refills: 3 | Status: ON HOLD | OUTPATIENT
Start: 2017-07-09 | End: 2017-09-20

## 2017-07-09 RX ORDER — SPIRONOLACTONE 25 MG/1
25 TABLET ORAL DAILY
Qty: 90 TABLET | Refills: 3 | Status: ON HOLD | OUTPATIENT
Start: 2017-07-09 | End: 2017-09-20 | Stop reason: HOSPADM

## 2017-07-09 RX ORDER — DOBUTAMINE HYDROCHLORIDE 400 MG/100ML
5 INJECTION INTRAVENOUS CONTINUOUS
Qty: 500 ML | Refills: 11 | Status: ON HOLD | OUTPATIENT
Start: 2017-07-09 | End: 2017-09-20 | Stop reason: HOSPADM

## 2017-07-09 RX ADMIN — ASPIRIN 81 MG CHEWABLE TABLET 81 MG: 81 TABLET CHEWABLE at 09:07

## 2017-07-09 RX ADMIN — Medication 3 ML: at 06:07

## 2017-07-09 RX ADMIN — FUROSEMIDE 40 MG: 40 TABLET ORAL at 09:07

## 2017-07-09 RX ADMIN — HEPARIN SODIUM 5000 UNITS: 5000 INJECTION, SOLUTION INTRAVENOUS; SUBCUTANEOUS at 06:07

## 2017-07-09 RX ADMIN — Medication 10 ML: at 12:07

## 2017-07-09 RX ADMIN — SPIRONOLACTONE 25 MG: 25 TABLET, FILM COATED ORAL at 09:07

## 2017-07-09 NOTE — PROGRESS NOTES
[facility]  Heart Transplant  Progress Note    Patient Name: Suman Hayden  MRN: [id number]  Admission Date: 7/3/2017  Hospital Length of Stay: 6 days  Attending Physician: Ortega Leos MD  Primary Care Provider: Joe Ernst MD  Principal Problem:Acute on chronic combined systolic and diastolic heart failure    Subjective:     Interval History:   Patient tolerated well ; diuresed well on lasix po. Currently euvolemic. Denies any SOB or chest pain.     Continuous Infusions:   DOBUTamine 5 mcg/kg/min (07/08/17 0507)     Scheduled Meds:   aspirin  81 mg Oral Daily    furosemide  40 mg Oral BID    heparin (porcine)  5,000 Units Subcutaneous Q8H    pravastatin  20 mg Oral QHS    sodium chloride 0.9%  10 mL Intravenous Q6H    sodium chloride 0.9%  3 mL Intravenous Q8H    spironolactone  25 mg Oral Daily     PRN Meds:Flushing PICC Protocol **AND** sodium chloride 0.9% **AND** sodium chloride 0.9%    Review of patient's allergies indicates:  No Known Allergies  Objective:     Vital Signs (Most Recent):  Temp: 98.1 °F (36.7 °C) (07/09/17 0500)  Pulse: 60 (07/09/17 0500)  Resp: 16 (07/09/17 0500)  BP: (!) 101/58 (07/09/17 0500)  SpO2: 98 % (07/09/17 0500) Vital Signs (24h Range):  Temp:  [97.7 °F (36.5 °C)-98.4 °F (36.9 °C)] 98.1 °F (36.7 °C)  Pulse:  [52-60] 60  Resp:  [16-18] 16  SpO2:  [96 %-99 %] 98 %  BP: (101-138)/(57-82) 101/58     Weight: 82.8 kg (182 lb 8.7 oz)  Body mass index is 27.76 kg/m².      Intake/Output Summary (Last 24 hours) at 07/09/17 0700  Last data filed at 07/09/17 0600   Gross per 24 hour   Intake              956 ml   Output             2350 ml   Net            -1394 ml       Hemodynamic Parameters:       Physical Exam   Constitutional: He is oriented to person, place, and time. He appears well-developed and well-nourished.   Neck: Normal range of motion. Neck supple. JVD present.   Just above the clavicle    Cardiovascular: Normal rate and regular rhythm.  Exam  reveals no gallop and no friction rub.    No murmur heard.  Intermittent S3   Pulmonary/Chest: Effort normal and breath sounds normal. He has no wheezes. He has no rales.   Abdominal: Soft. Bowel sounds are normal. There is no tenderness.   Musculoskeletal: He exhibits no edema.   Neurological: He is alert and oriented to person, place, and time.   Skin: Skin is warm and dry.       Significant Labs:  CBC:    Recent Labs  Lab 07/08/17  0518   WBC 6.34   RBC 4.70   HGB 13.4*   HCT 40.3   *   MCV 86   MCH 28.5   MCHC 33.3     BNP:    Recent Labs  Lab 07/08/17  0516   BNP 1,314*     CMP:    Recent Labs  Lab 07/08/17  0516   GLU 69*   CALCIUM 9.1   ALBUMIN 3.0*   PROT 6.3      K 3.8   CO2 28   CL 96   BUN 15   CREATININE 1.1   ALKPHOS 53*   ALT 11   AST 16   BILITOT 2.8*      Coagulation:     Recent Labs  Lab 07/07/17  0458   INR 1.1   APTT 28.3     LDH:    Recent Labs  Lab 07/06/17  0932        Microbiology:  Microbiology Results (last 7 days)     ** No results found for the last 168 hours. **              Lab Results   Component Value Date    BNP 1,314 (H) 07/08/2017     (L) 07/09/2017    K 3.8 07/09/2017    MG 2.0 07/03/2017    CL 97 07/09/2017    CO2 29 07/09/2017    BUN 15 07/09/2017    CREATININE 1.1 07/09/2017    GLU 72 07/09/2017    HGBA1C 5.4 07/06/2017    AST 19 07/09/2017    ALT 14 07/09/2017    ALBUMIN 3.1 (L) 07/09/2017    PROT 6.6 07/09/2017    BILITOT 2.5 (H) 07/09/2017    CHOL 122 07/06/2017    HDL 48 07/06/2017    LDLCALC 63.4 07/06/2017    TRIG 53 07/06/2017       Magnesium   Date Value Ref Range Status   07/03/2017 2.0 1.6 - 2.6 mg/dL Final       Lab Results   Component Value Date    WBC 5.24 07/09/2017    HGB 13.0 (L) 07/09/2017    HCT 38.5 (L) 07/09/2017    MCV 85 07/09/2017     (L) 07/09/2017       Lab Results   Component Value Date    INR 1.1 07/07/2017    INR 1.3 (H) 07/05/2017    INR 1.5 (H) 07/03/2017       BNP   Date Value Ref Range Status   07/08/2017 1,314 (H)  0 - 99 pg/mL Final     Comment:     Values of less than 100 pg/ml are consistent with non-CHF populations.   07/06/2017 2,045 (H) 0 - 99 pg/mL Final     Comment:     Values of less than 100 pg/ml are consistent with non-CHF populations.   06/27/2017 3,344 (H) 0 - 99 pg/mL Final     Comment:     Values of less than 100 pg/ml are consistent with non-CHF populations.             Assessment and Plan:     Hepatitis B core antibody positive    -Hepatitis B viral load ordered and pending        Elevated PSA    -Appreciate Urology consultation: not overly concerning for malignancy. Recommending outpatient follow up        V-tach    -reported hx of V-tach  -ICD in place  -Amiodarone stopped this admission; device interrogation pending        Hyperlipidemia    -will continue home dose of Pravastatin         HTN (hypertension)    -Blood pressure well controlled   -BP medications include Spironolactone  -Will continue current regimen.        * Acute on chronic combined systolic and diastolic heart failure    -NICM; Last 2D Echo 07/3/17: LVEF 10-15%, LVEDD 6.7 cm  -Will be discharged home today on  5 mcg/km/min  -Follow up in one week  -Continue with lasix po  Net negative 291cc in the last 24 hours.   -GDMT with Spironolactone, Sacubitril-Valsartan stopped 7/5  -Patient is currently being evaluated for OHTx/VAD  -Anticipate presentation at selection next Wednesday  -2g Na dietary restriction, 1500 mL fluid restriction, strict I/Os              Karri Ibanez MD  Heart Transplant  [facility]

## 2017-07-09 NOTE — PLAN OF CARE
[facility]   Heart Transplant Clinic  [address] [place] [address]   [phone] after hours        HOME  HEALTH ORDERS      Admit to Home Health    Diagnosis:   Patient Active Problem List   Diagnosis    Nonischemic cardiomyopathy    CHF (NYHA class IV, ACC/AHA stage D)    Encounter for monitoring amiodarone therapy    Acute on chronic combined systolic and diastolic heart failure    HTN (hypertension)    Hyperlipidemia    V-tach    Elevated PSA    Hepatitis B core antibody positive    Acute on chronic systolic heart failure    Acute on chronic heart failure    Heart transplant candidate       Patient is homebound due to:  CHF    Diet: normal    Acitivities: as tolerated     Nursing:   SN to complete comprehensive assessment including routine vital signs. Instruct on disease process and s/s of complications to report to MD. Review/verify medication list sent home with the patient at time of discharge  and instruct patient/caregiver as needed. Frequency may be adjusted depending on start of care date.    Notify MD if SBP > 160 or < 90; DBP > 90 or < 50; HR > 120 or < 50; Temp > 101; Weight gain >3lbs in 1 day or 5lbs in 1 week.   Other:       LABS:  SN to perform labs: CMP 7/12/2017      HOME INFUSION THERAPY:    SN to perform Infusion Therapy/Central Line Care.  Review Central Line Care & Central Line Flush with patient.    Administer (drug and dose): dobutamine 5 mcg/kg/min IV dosing weight 82 kg      **For questions or concerns, please call [phone] and ask for Pre-Heart transplant clinic, M-F 8-5. After hours, weekends, call [phone] and ask for the Heart Transplant Cardiologist on call.**    Central line care:  Scrub the Hub: Prior to accessing the line, always perform a 30 second alcohol scrub  Each lumen of the central line is to be flushed at least daily with 10 mL Normal Saline and 3 mL Heparin flush (100 units/mL)  Skilled Nurse  (SN) may draw blood from IV access  Blood Draw Procedure:   - Aspirate at least 5 mL of blood   - Discard   - Obtain specimen   - Change posiflow cap   - Flush with 20 mL Normal Saline followed by a                 3-5 mL Heparin flush (100 units/mL)  Central :   - Sterile dressing changes are done weekly and as needed.   - Use chlor-hexadine scrub to cleanse site, apply Biopatch to insertion site,       apply securement device dressing   - Posi-flow caps are changed weekly and after EVERY lab draw.   - If sterile gauze is under dressing to control oozing,                 dressing change must be performed every 24 hours until gauze is not needed.            Send initial Home Health orders to HTS attending physician on call.  Send follow up questions to [phone] or fax:                      Pre Transplant:   [phone]        Post Transplant: [phone]        Heart Failure:      [phone]

## 2017-07-09 NOTE — SUBJECTIVE & OBJECTIVE
Interval History:   Patient tolerated well ; diuresed well on lasix po. Currently euvolemic. Denies any SOB or chest pain.     Continuous Infusions:   DOBUTamine 5 mcg/kg/min (07/08/17 0507)     Scheduled Meds:   aspirin  81 mg Oral Daily    furosemide  40 mg Oral BID    heparin (porcine)  5,000 Units Subcutaneous Q8H    pravastatin  20 mg Oral QHS    sodium chloride 0.9%  10 mL Intravenous Q6H    sodium chloride 0.9%  3 mL Intravenous Q8H    spironolactone  25 mg Oral Daily     PRN Meds:Flushing PICC Protocol **AND** sodium chloride 0.9% **AND** sodium chloride 0.9%    Review of patient's allergies indicates:  No Known Allergies  Objective:     Vital Signs (Most Recent):  Temp: 98.1 °F (36.7 °C) (07/09/17 0500)  Pulse: 60 (07/09/17 0500)  Resp: 16 (07/09/17 0500)  BP: (!) 101/58 (07/09/17 0500)  SpO2: 98 % (07/09/17 0500) Vital Signs (24h Range):  Temp:  [97.7 °F (36.5 °C)-98.4 °F (36.9 °C)] 98.1 °F (36.7 °C)  Pulse:  [52-60] 60  Resp:  [16-18] 16  SpO2:  [96 %-99 %] 98 %  BP: (101-138)/(57-82) 101/58     Weight: 82.8 kg (182 lb 8.7 oz)  Body mass index is 27.76 kg/m².      Intake/Output Summary (Last 24 hours) at 07/09/17 0700  Last data filed at 07/09/17 0600   Gross per 24 hour   Intake              956 ml   Output             2350 ml   Net            -1394 ml       Hemodynamic Parameters:       Physical Exam   Constitutional: He is oriented to person, place, and time. He appears well-developed and well-nourished.   Neck: Normal range of motion. Neck supple. JVD present.   Just above the clavicle    Cardiovascular: Normal rate and regular rhythm.  Exam reveals no gallop and no friction rub.    No murmur heard.  Intermittent S3   Pulmonary/Chest: Effort normal and breath sounds normal. He has no wheezes. He has no rales.   Abdominal: Soft. Bowel sounds are normal. There is no tenderness.   Musculoskeletal: He exhibits no edema.   Neurological: He is alert and oriented to person, place, and time.    Skin: Skin is warm and dry.       Significant Labs:  CBC:    Recent Labs  Lab 07/08/17  0518   WBC 6.34   RBC 4.70   HGB 13.4*   HCT 40.3   *   MCV 86   MCH 28.5   MCHC 33.3     BNP:    Recent Labs  Lab 07/08/17  0516   BNP 1,314*     CMP:    Recent Labs  Lab 07/08/17  0516   GLU 69*   CALCIUM 9.1   ALBUMIN 3.0*   PROT 6.3      K 3.8   CO2 28   CL 96   BUN 15   CREATININE 1.1   ALKPHOS 53*   ALT 11   AST 16   BILITOT 2.8*      Coagulation:     Recent Labs  Lab 07/07/17  0458   INR 1.1   APTT 28.3     LDH:    Recent Labs  Lab 07/06/17  0932        Microbiology:  Microbiology Results (last 7 days)     ** No results found for the last 168 hours. **

## 2017-07-09 NOTE — PROGRESS NOTES
Pt discharged per MD orders.  Tele and IV discontinued.  Catheter tip intact x1.  Medication list and prescriptions reviewed; prescriptions sent to pt preferred pharmacy and printed prescriptions provided.  Pt verbalizes understanding of all written and verbal discharge instructions.  MIS performed and documented in chart. Pt awaiting [place] arrival for.  Will continue to monitor.

## 2017-07-09 NOTE — PLAN OF CARE
Problem: Patient Care Overview  Goal: Plan of Care Review  Plan of care discussed with patient.  Patient ambulating independently, fall precautions in place. Patient has no complaints of pain.  gtt continued. Discussed medications and care. Patient has no questions at this time.White board updated. Bed locked in lowest position. Side rails up x2. Will continue to monitor.

## 2017-07-09 NOTE — PLAN OF CARE
Problem: Patient Care Overview  Goal: Plan of Care Review  Outcome: Outcome(s) achieved Date Met: 07/09/17  Pt remain free of falls, injury, and complaints throughout the shift. Generalized skin remains CDI with mild generalized edema noted; VSS. Pt to be discharged home after [facility] comes to hook up pt to home IV pump for  admin. Pt tolerating plan of care.

## 2017-07-09 NOTE — CONSULTS
ID Note:    Patient was discharged home today before being seen.]  Discussed with Dr. Leos  Patient returning 7/18.  Will attempt to schedule outpatient evaluation in ID clinic on that day.     Thank you.   Please call for any questions or concerns.  YURI Butler, ANP-C  [phone]

## 2017-07-09 NOTE — DISCHARGE SUMMARY
[facility]  Heart Transplant  Discharge Summary      Patient Name: Suman Hayden  MRN: [id number]  Admission Date: 7/3/2017  Hospital Length of Stay: 6 days  Discharge Date and Time: 07/09/2017 8:46 AM  Attending Physician: Ledy Leos MD   Discharging Provider: Karri Ibanez MD  Primary Care Provider: Joe Ernst MD     HPI:   Mr. Hayden is a 67 y.o. year old Unknown male who has presents to be considered for advanced surgical options (LVAD/OHT).  CHF since 2006 who has had progressive decline since Jan 2017 as he has required monthly admission.  Feels marginally better since starting 24/256 entresto in early June 2017.  Still has PEREIRA after one block, early satiety, moderate edema, and three pillow orthopnea.         Hospital Course:   Admitted for diuresis given ADHF. Entresto and coreg were stopped. He aggressively diuresed; RHC showed elevated PCWP (24), PA 42/24. He was started on  5 mcg/kg/hour. Remained clinically stable. He will be discharged home with  infusion and will be presented at the committee this Wednesday for advanced options.     Consults         Status Ordering Provider     Inpatient consult to Cardiothoracic Surgery  Once     Provider:  (Not yet assigned)    Completed LEDY LEOS     Inpatient consult to Infectious Diseases  Once     Provider:  (Not yet assigned)    Completed LEDY LEOS     Inpatient consult to Palliative Care  Once     Provider:  Kelly Solorio, CNS    Acknowledged LEDY LEOS     Inpatient consult to PICC team ([facility])  Once     Provider:  (Not yet assigned)    Completed LEDY LEOS     Inpatient consult to [facility]ICC team ([facility])  Once     Provider:  (Not yet assigned)    Completed HARSHAL SUTTON     Inpatient consult to PICC team ([facility])  Once     Provider:  (Not yet assigned)    Completed HARSHAL SUTTON     Inpatient consult to PICC team ([facility])  Once     Provider:  (Not yet assigned)    Completed ABI MORRIS      Inpatient Consult to Pre-VAD Coordinator  Once     Provider:  (Not yet assigned)    Completed LEDY THACKER     Inpatient Consult to Transplant Coordinator  Once     Provider:  (Not yet assigned)    Completed LEDY THACKER     Inpatient consult to Urology  Once     Provider:  (Not yet assigned)    Completed LEDY THACKER     IP consult to dietary  Once     Provider:  (Not yet assigned)    Completed ABI MORRIS          Significant Diagnostic Studies: Labs:   BMP:   Recent Labs  Lab 07/08/17  0516 07/09/17  0643   GLU 69* 72    135*   K 3.8 3.8   CL 96 97   CO2 28 29   BUN 15 15   CREATININE 1.1 1.1   CALCIUM 9.1 9.2    and All labs within the past 24 hours have been reviewed    Pending Diagnostic Studies:     Procedure Component Value Units Date/Time    CT Chest Without Contrast [[id number]] Resulted:  07/03/17 1842    Order Status:  Sent Lab Status:  In process Updated:  07/03/17 1858    Factor 5 leiden [[id number]] Collected:  07/07/17 0500    Order Status:  Sent Lab Status:  In process Updated:  07/07/17 0557    Specimen:  Blood from Blood     G6PD,Quantitative [[id number]] Collected:  07/07/17 0500    Order Status:  Sent Lab Status:  In process Updated:  07/07/17 0557    Specimen:  Blood from Blood     HLA Solid Organ Recipient Workup [[id number]] Collected:  07/06/17 0929    Order Status:  Sent Lab Status:  In process Updated:  07/06/17 1120    Specimen:  Blood from Blood     Hepatitis B Viral DNA, Quantitative [[id number]] Collected:  07/06/17 1701    Order Status:  Sent Lab Status:  In process Updated:  07/06/17 1746    Specimen:  Blood from Blood     Protein C antigen, total [[id number]] Collected:  07/07/17 0503    Order Status:  Sent Lab Status:  In process Updated:  07/07/17 0557    Specimen:  Blood from Blood     Protein S antigen, free [[id number]] Collected:  07/07/17 0459    Order Status:  Sent Lab Status:  In process Updated:  07/07/17 0557    Specimen:  Blood from Blood     Protein  S antigen, total [[id number]] Collected:  07/07/17 0458    Order Status:  Sent Lab Status:  In process Updated:  07/07/17 0557    Specimen:  Blood from Blood     Prothrombin gene mutation [[id number]] Collected:  07/07/17 0500    Order Status:  Sent Lab Status:  In process Updated:  07/07/17 0557    Specimen:  Blood from Blood     Toxoplasma Gondii IgG [[id number]] Collected:  07/06/17 0931    Order Status:  Sent Lab Status:  In process Updated:  07/06/17 0950    Specimen:  Blood     Varicella zoster antibody, IgG [[id number]] Collected:  07/06/17 0932    Order Status:  Sent Lab Status:  In process Updated:  07/06/17 0950    Specimen:  Blood from Blood     Von Willebrand multimeric [[id number]] Collected:  07/07/17 0458    Order Status:  Sent Lab Status:  In process Updated:  07/07/17 0557    Specimen:  Blood from Blood     X-Ray Chest 1 View for PICC_Central line [[id number]]     Order Status:  Sent Lab Status:  No result         Final Active Diagnoses:    Diagnosis Date Noted POA    PRINCIPAL PROBLEM:  Acute on chronic systolic heart failure [I50.23] 07/07/2017 Yes    Elevated PSA [R97.20] 07/07/2017 No    Hepatitis B core antibody positive [R76.8] 07/07/2017 Yes    Acute on chronic heart failure [I50.9] 07/07/2017 Yes    Heart transplant candidate [Z76.82]  Not Applicable    HTN (hypertension) [I10] 07/05/2017 Unknown    Hyperlipidemia [E78.5] 07/05/2017 Unknown    V-tach [I47.2] 07/05/2017 Unknown    Acute on chronic combined systolic and diastolic heart failure [I50.43] 07/04/2017 Yes    Nonischemic cardiomyopathy [I42.8] 06/25/2017 Yes      Problems Resolved During this Admission:    Diagnosis Date Noted Date Resolved POA    Acute on chronic systolic heart failure [I50.23] 07/03/2017 07/04/2017 Yes    Hypertensive heart disease with heart failure [I11.0] 06/27/2017 07/04/2017 Yes      Discharged Condition: good    Disposition: Home or Self Care    Follow Up:  Follow-up Information     Ortega Leos MD  In 1 week.    Specialties:  Cardiology, Transplant  Contact information:  [address]  [facility] [address]  [phone]                 Patient Instructions:   No discharge procedures on file.  Medications:  Reconciled Home Medications:   Current Discharge Medication List      START taking these medications    Details   DOBUTamine (DOBUTREX) 1,000 mg/250 mL (4,000 mcg/mL) infusion Inject 414 mcg/min into the vein continuous.  Qty: 500 mL, Refills: 11      spironolactone (ALDACTONE) 25 MG tablet Take 1 tablet (25 mg total) by mouth once daily.  Qty: 90 tablet, Refills: 3         CONTINUE these medications which have CHANGED    Details   furosemide (LASIX) 40 MG tablet Take 1 tablet (40 mg total) by mouth 2 (two) times daily.  Qty: 180 tablet, Refills: 3         CONTINUE these medications which have NOT CHANGED    Details   amiodarone (PACERONE) 200 MG Tab Take by mouth once daily.      aspirin 81 MG Chew Take 81 mg by mouth once daily.      potassium chloride SA (K-DUR,KLOR-CON) 20 MEQ tablet Take 20 mEq by mouth 2 (two) times daily.      pravastatin (PRAVACHOL) 20 MG tablet Take 20 mg by mouth every evening.      duloxetine (CYMBALTA) 60 MG capsule Take 60 mg by mouth once daily.         STOP taking these medications       carvedilol (COREG) 12.5 MG tablet Comments:   Reason for Stopping:         IRON/FOLIC ACID/B12/C/DOCUSATE (FERRALET 90 ORAL) Comments:   Reason for Stopping:         sacubitril-valsartan (ENTRESTO) 24-26 mg per tablet Comments:   Reason for Stopping:         polyethylene glycol (GLYCOLAX) 17 gram PwPk Comments:   Reason for Stopping:         potassium chloride (MICRO-K) 10 MEQ CpSR Comments:   Reason for Stopping:               Karri Ibanez MD  Heart Transplant  [facility]

## 2017-07-10 DIAGNOSIS — Z76.82 HEART TRANSPLANT CANDIDATE: Primary | ICD-10-CM

## 2017-07-10 LAB
CLASS I ANTIBODIES - LUMINEX: NEGATIVE
CLASS II ANTIBODIES - LUMINEX: NEGATIVE
CPRA %: 0
F2 GENE MUT ANL BLD/T: NORMAL
F5 P.R506Q BLD/T QL: NORMAL
G6PD RBC-CCNT: 12.2 U/G HGB (ref 7–20.5)
PROT C AG ACT/NOR PPP IA: 69 % (ref 70–150)
PROT S AG ACT/NOR PPP IA: 88 %
SERUM COLLECTION DT - LUMINEX CLASS I: NORMAL
SERUM COLLECTION DT - LUMINEX CLASS II: NORMAL
SPCL1 TESTING DATE: NORMAL
SPCL2 TESTING DATE: NORMAL
VARICELLA INTERPRETATION: POSITIVE
VARICELLA ZOSTER IGG: 3.82 ISR

## 2017-07-10 NOTE — PROGRESS NOTES
Received call from HLA lab stating HLA sample processed last week is invalid on B cell.  They recommend redraw with order LAB [id number].  Order entered and linked to next blood draw.

## 2017-07-11 ENCOUNTER — DOCUMENTATION ONLY (OUTPATIENT)
Dept: TRANSFUSION MEDICINE | Facility: HOSPITAL | Age: 67
End: 2017-07-11

## 2017-07-11 DIAGNOSIS — I50.84 CHF (NYHA CLASS IV, ACC/AHA STAGE D): Primary | ICD-10-CM

## 2017-07-11 LAB
HEPATITIS B VIRAL DNA - QUANTITATIVE: 16 IU/ML
HEPATITIS B VIRUS DNA: DETECTED
LOG HBV IU/ML: 1.2 LOG (10) IU/ML
PROT S FREE AG ACT/NOR PPP IA: 92 %

## 2017-07-12 ENCOUNTER — COMMITTEE REVIEW (OUTPATIENT)
Dept: TRANSPLANT | Facility: CLINIC | Age: 67
End: 2017-07-12

## 2017-07-12 ENCOUNTER — DOCUMENTATION ONLY (OUTPATIENT)
Dept: TRANSPLANT | Facility: CLINIC | Age: 67
End: 2017-07-12

## 2017-07-12 ENCOUNTER — TELEPHONE (OUTPATIENT)
Dept: TRANSPLANT | Facility: CLINIC | Age: 67
End: 2017-07-12

## 2017-07-12 DIAGNOSIS — B19.10 HEPATITIS B INFECTION WITHOUT DELTA AGENT WITHOUT HEPATIC COMA, UNSPECIFIED CHRONICITY: Primary | ICD-10-CM

## 2017-07-12 LAB
DNA MARKER ASSESSED PNL: 23
DNA MARKER ASSESSED PNL: 35
DNA MARKER ASSESSED PNL: 4
DNA MARKER ASSESSED PNL: 4
DNA MARKER ASSESSED PNL: 58
DNA MARKER ASSESSED PNL: 6
DNA MARKER ASSESSED PNL: 66
DNA MARKER ASSESSED PNL: 7
DNA MARKER ASSESSED PNL: NORMAL
HLA DQA1 1: NORMAL
HLA DQA1 2: NORMAL
HLA DRB4 1: NORMAL
HLA-DP 1 SERO. EQUIV: NORMAL
HLA-DP 2 SERO. EQUIV: NORMAL
HLA-DPA1 1: NORMAL
HLA-DPA1 2: NORMAL
HLA-DPB1 1: NORMAL
HLA-DPB1 2: NORMAL
HLA-DQ 1 SERO. EQUIV: 6
HLA-DQ 2 SERO. EQUIV: NORMAL
HLA-DQB1 1: NORMAL
HLA-DQB1 2: NORMAL
HLA-DRB1 1 SERO. EQUIV: 15
HLA-DRB1 1: NORMAL
HLA-DRB1 2 SERO. EQUIV: NORMAL
HLA-DRB1 2: NORMAL
HLA-DRB3 1 HI RES: NORMAL
HLA-DRB3 1: NORMAL
HLA-DRB3 2 HI RES: NORMAL
HLA-DRB3 2: NORMAL
HLA-DRB345 1 SERO. EQUIV: 51
HLA-DRB345 2 SERO. EQUIV: NORMAL
HLA-DRB4 1 HI RES: NORMAL
HLA-DRB4 2 HI RES: NORMAL
HLA-DRB4 2: NORMAL
HLA-DRB5 1 HI RES: NORMAL
HLA-DRB5 1: NORMAL
HLA-DRB5 2 HI RES: NORMAL
HLA-DRB5 2: NORMAL
HRDRW TESTING DATE: NORMAL
LDNA2 TESTING DATE: NORMAL
T GONDII IGG SER QL IA: REACTIVE
T GONDII IGG SERPL IA-ACNC: >250 IU/ML

## 2017-07-12 NOTE — NURSING
PHARM.D. PRE-TRANSPLANT NOTE:    This patient's medication therapy was evaluated and any issues identified were discussed with the Selection Committee at the time the patient was presented as a candidate for heart transplantation.  Patient is currently at moderate risk for rejection, as he is an  male with a 0% cPRA.  Reassessment of rejection risk will need to occur at the time of transplant.  I am available for consultation and can be contacted, as needed by the other members of the heart Transplant team.

## 2017-07-12 NOTE — COMMITTEE REVIEW
Native Organ Dx: NICM    Denied     Pt's case was presented to Selection Committee 7/12/17. It was the decision of the committee to decline the pt for transplantation due to comorbidities that despite transplant would limit long term survivial (elevated pulmonary pressures and active Hepatitis B ).       He is approved for LVAD as DT pending liver biopsy and clearance by hepatology    Note was written by Corinne Kapadia.    ==========================================================    I was present at the meeting and attest to the decision of the committee  Referring notfiied by phone

## 2017-07-12 NOTE — TELEPHONE ENCOUNTER
Home number busy after multiple attempts.  Left voicemail on wife's cell requesting call back from pt to discuss committee decision

## 2017-07-12 NOTE — TELEPHONE ENCOUNTER
"Spoke with pt and wife regarding committee decision to decline OHT due to elevated PA pressures and Hep B as well as need for hepatology clearance with liver bx prior to approval for LVAD surgery.     Pt/wife report they were told "he was positive for hepatis B core many years ago but that it wasn't an issue we needed to worry about". Advised them that pt has low level circulating virus and that he needs a liver biopsy to see if there is permanent liver damage that would make LVAD surgery too risky.  Advised that consult to hepatology has been placed and a message sent to Dr Macias in [facility] to see if she can see pt soon.  If unable to be seen in  then he will be scheduled at [facility].   Pt/wife voiced understanding.   "

## 2017-07-13 ENCOUNTER — DOCUMENTATION ONLY (OUTPATIENT)
Dept: TRANSPLANT | Facility: CLINIC | Age: 67
End: 2017-07-13

## 2017-07-13 LAB
B CELL RESULTS - XM AUTO: NORMAL
FXMAU TESTING DATE: NORMAL
HLA AB QL: NEGATIVE
HLA AB SERPL: NEGATIVE
HLATY INTERPRETATION: NORMAL
SCRFL TESTING DATE: NORMAL
SERUM COLLECTION DT - XM AUTO: NORMAL
SERUM COLLECTION DT: NORMAL
T CELL RESULTS - XM AUTO: NEGATIVE
T MCS AVERAGE - XM AUTO: -3.75

## 2017-07-13 NOTE — NURSING
Pt records reviewed.   Pt will be referred to Hepatology.    Initial referral received  from the workque.   Referring Provider/diagnosis  LEDY THACKER Provider:   Diagnosis: Hepatitis B infection without delta agent without hepatic coma, unspecified chronicity       Referral letter sent to provider and patient.

## 2017-07-13 NOTE — LETTER
July 13, 2017    Suman Hayden  [address] [facility] [address]      Dear Suman Hayden:    Your doctor has referred you to the [facility]. You will be contacted by our office and an initial appointment will then be scheduled for you.    We look forward to seeing you soon. If you have any further questions, please contact us at [phone].       Sincerely,        [facility]   [address] [place] [id number]  [phone]

## 2017-07-14 LAB
AMPHETAMINES SERPL QL: NEGATIVE
BARBITURATES SERPL QL SCN: NEGATIVE
BENZODIAZ SERPL QL: NEGATIVE
CANNABINOIDS SERPL QL: NEGATIVE
COCAINE, BLOOD: NEGATIVE
ETHANOL SERPL-MCNC: NEGATIVE MG/DL
METHADONE SERPL QL SCN: NEGATIVE
OPIATES SERPL QL SCN: NEGATIVE
PCP SERPL QL SCN: NEGATIVE
PROPOXYPH SERPL QL: NEGATIVE
VWF MULTIMERS PPP QL: NORMAL

## 2017-07-17 ENCOUNTER — DOCUMENTATION ONLY (OUTPATIENT)
Dept: TRANSPLANT | Facility: CLINIC | Age: 67
End: 2017-07-17

## 2017-07-17 NOTE — PT/OT/SLP DISCHARGE
Occupational Therapy Discharge Summary    Suman Hayden  MRN: [id number]   Acute on chronic systolic heart failure   Patient Discharged from acute Occupational Therapy on 7/17/2017  .  Please refer to prior OT note dated on 7/6/17 for functional status.     Assessment:   Patient appropriate for care in another setting.  GOALS:    Occupational Therapy Goals     Not on file          Multidisciplinary Problems (Resolved)        Problem: Occupational Therapy Goal    Goal Priority Disciplines Outcome Interventions   Occupational Therapy Goal   (Resolved)     OT, PT/OT Outcome(s) achieved    Description:  Goals to be met by: 7/18/2017    Patient will increase functional independence with ADLs by performing:    Pt will tolerate static standing during activity of choice for 15 minutes.   Pt will tolerate dynamic standing activity for 20 minutes with vital signs stable.   Pt will shower with mod I prior to surgery if permitted by nursing staff.   Goals achieved.                    Goals not yet achieved upon hospital d/c.    Reasons for Discontinuation of Therapy Services  Transfer to alternate level of care.      Plan:  Patient Discharged to: Home with Home Health Service   DELMY Navarro  7/17/2017  .

## 2017-07-17 NOTE — PROGRESS NOTES
Received faxed labs from  dated 7/17/174 .   Results are consistent with recent trends.  Creat up slightly to 1.4.  Pt being seen in clinic in AM. Will address with Dr Leos at that time.

## 2017-07-18 ENCOUNTER — OFFICE VISIT (OUTPATIENT)
Dept: TRANSPLANT | Facility: CLINIC | Age: 67
DRG: 001 | End: 2017-07-18
Payer: MEDICARE

## 2017-07-18 ENCOUNTER — HOSPITAL ENCOUNTER (OUTPATIENT)
Dept: PULMONOLOGY | Facility: CLINIC | Age: 67
Discharge: HOME | DRG: 001 | End: 2017-07-18
Payer: MEDICARE

## 2017-07-18 ENCOUNTER — CLINICAL SUPPORT (OUTPATIENT)
Dept: TRANSPLANT | Facility: CLINIC | Age: 67
DRG: 001 | End: 2017-07-18
Payer: MEDICARE

## 2017-07-18 ENCOUNTER — OFFICE VISIT (OUTPATIENT)
Dept: HEPATOLOGY | Facility: CLINIC | Age: 67
DRG: 001 | End: 2017-07-18
Attending: INTERNAL MEDICINE
Payer: MEDICARE

## 2017-07-18 ENCOUNTER — DOCUMENTATION ONLY (OUTPATIENT)
Dept: TRANSPLANT | Facility: CLINIC | Age: 67
End: 2017-07-18

## 2017-07-18 ENCOUNTER — HOSPITAL ENCOUNTER (INPATIENT)
Facility: HOSPITAL | Age: 67
LOS: 66 days | Discharge: HOME HEALTH | DRG: 001 | End: 2017-09-22
Attending: EMERGENCY MEDICINE | Admitting: INTERNAL MEDICINE
Payer: MEDICARE

## 2017-07-18 VITALS
RESPIRATION RATE: 18 BRPM | OXYGEN SATURATION: 97 % | HEIGHT: 68 IN | BODY MASS INDEX: 26.9 KG/M2 | HEART RATE: 82 BPM | WEIGHT: 177.5 LBS | TEMPERATURE: 97 F | SYSTOLIC BLOOD PRESSURE: 117 MMHG | DIASTOLIC BLOOD PRESSURE: 73 MMHG

## 2017-07-18 VITALS — HEIGHT: 68 IN | WEIGHT: 176 LBS | BODY MASS INDEX: 26.67 KG/M2

## 2017-07-18 VITALS
HEIGHT: 68 IN | BODY MASS INDEX: 27 KG/M2 | WEIGHT: 178.13 LBS | HEART RATE: 59 BPM | SYSTOLIC BLOOD PRESSURE: 106 MMHG | DIASTOLIC BLOOD PRESSURE: 68 MMHG

## 2017-07-18 DIAGNOSIS — I42.8 NONISCHEMIC CARDIOMYOPATHY: ICD-10-CM

## 2017-07-18 DIAGNOSIS — Z45.02 AICD DISCHARGE: ICD-10-CM

## 2017-07-18 DIAGNOSIS — I50.84 CHF (NYHA CLASS IV, ACC/AHA STAGE D): ICD-10-CM

## 2017-07-18 DIAGNOSIS — I42.8 OTHER CARDIOMYOPATHIES: ICD-10-CM

## 2017-07-18 DIAGNOSIS — I51.7 CARDIOMEGALY: ICD-10-CM

## 2017-07-18 DIAGNOSIS — E87.0 HYPERNATREMIA: ICD-10-CM

## 2017-07-18 DIAGNOSIS — I50.9 CONGESTIVE HEART FAILURE, UNSPECIFIED CONGESTIVE HEART FAILURE CHRONICITY, UNSPECIFIED CONGESTIVE HEART FAILURE TYPE: ICD-10-CM

## 2017-07-18 DIAGNOSIS — E80.6 HYPERBILIRUBINEMIA: Chronic | ICD-10-CM

## 2017-07-18 DIAGNOSIS — N17.9 ACUTE KIDNEY INJURY SUPERIMPOSED ON CKD: ICD-10-CM

## 2017-07-18 DIAGNOSIS — R76.8 HEPATITIS B CORE ANTIBODY POSITIVE: ICD-10-CM

## 2017-07-18 DIAGNOSIS — I50.9 HEART FAILURE: ICD-10-CM

## 2017-07-18 DIAGNOSIS — I50.43 ACUTE ON CHRONIC COMBINED SYSTOLIC AND DIASTOLIC HEART FAILURE: ICD-10-CM

## 2017-07-18 DIAGNOSIS — I47.20 V-TACH: ICD-10-CM

## 2017-07-18 DIAGNOSIS — N18.9 ACUTE KIDNEY INJURY SUPERIMPOSED ON CKD: ICD-10-CM

## 2017-07-18 DIAGNOSIS — I42.9 CARDIOMYOPATHY: ICD-10-CM

## 2017-07-18 DIAGNOSIS — Z95.810 PRESENCE OF AUTOMATIC IMPLANTABLE CARDIOVERTER-DEFIBRILLATOR: ICD-10-CM

## 2017-07-18 DIAGNOSIS — I50.22 CHRONIC SYSTOLIC CONGESTIVE HEART FAILURE: ICD-10-CM

## 2017-07-18 DIAGNOSIS — E78.2 MIXED HYPERLIPIDEMIA: ICD-10-CM

## 2017-07-18 DIAGNOSIS — N17.0 ACUTE RENAL FAILURE WITH ACUTE TUBULAR NECROSIS SUPERIMPOSED ON STAGE 3 CHRONIC KIDNEY DISEASE: ICD-10-CM

## 2017-07-18 DIAGNOSIS — Z95.810 AICD (AUTOMATIC CARDIOVERTER/DEFIBRILLATOR) PRESENT: ICD-10-CM

## 2017-07-18 DIAGNOSIS — E78.5 HYPERLIPIDEMIA, UNSPECIFIED HYPERLIPIDEMIA TYPE: ICD-10-CM

## 2017-07-18 DIAGNOSIS — I10 ESSENTIAL HYPERTENSION: ICD-10-CM

## 2017-07-18 DIAGNOSIS — I50.9 CHF (CONGESTIVE HEART FAILURE): ICD-10-CM

## 2017-07-18 DIAGNOSIS — Z00.6 EXAMINATION OF PARTICIPANT IN CLINICAL TRIAL: ICD-10-CM

## 2017-07-18 DIAGNOSIS — I47.19 ATRIAL TACHYCARDIA: ICD-10-CM

## 2017-07-18 DIAGNOSIS — N17.9 AKI (ACUTE KIDNEY INJURY): ICD-10-CM

## 2017-07-18 DIAGNOSIS — R78.81 BACTEREMIA: ICD-10-CM

## 2017-07-18 DIAGNOSIS — I50.23 ACUTE ON CHRONIC SYSTOLIC HEART FAILURE: ICD-10-CM

## 2017-07-18 DIAGNOSIS — Z98.890 POST-OPERATIVE STATE: ICD-10-CM

## 2017-07-18 DIAGNOSIS — Z95.811 LVAD (LEFT VENTRICULAR ASSIST DEVICE) PRESENT: ICD-10-CM

## 2017-07-18 DIAGNOSIS — R55 SYNCOPE AND COLLAPSE: ICD-10-CM

## 2017-07-18 DIAGNOSIS — R76.8 HEPATITIS B CORE ANTIBODY POSITIVE: Primary | ICD-10-CM

## 2017-07-18 DIAGNOSIS — N18.30 ACUTE RENAL FAILURE WITH ACUTE TUBULAR NECROSIS SUPERIMPOSED ON STAGE 3 CHRONIC KIDNEY DISEASE: ICD-10-CM

## 2017-07-18 DIAGNOSIS — Z95.811 HEART REPLACED BY HEART ASSIST DEVICE: Primary | ICD-10-CM

## 2017-07-18 DIAGNOSIS — R73.9 HYPERGLYCEMIA: ICD-10-CM

## 2017-07-18 DIAGNOSIS — Z01.818 PREOPERATIVE TESTING: ICD-10-CM

## 2017-07-18 LAB
ABO + RH BLD: NORMAL
ALBUMIN SERPL BCP-MCNC: 3.3 G/DL
ALLENS TEST: ABNORMAL
ALP SERPL-CCNC: 54 U/L
ALT SERPL W/O P-5'-P-CCNC: 13 U/L
ANION GAP SERPL CALC-SCNC: 12 MMOL/L
APTT BLDCRRT: 21.7 SEC
APTT BLDCRRT: 22.6 SEC
AST SERPL-CCNC: 17 U/L
BASOPHILS # BLD AUTO: 0.02 K/UL
BASOPHILS NFR BLD: 0.4 %
BILIRUB SERPL-MCNC: 2.2 MG/DL
BILIRUB UR QL STRIP: NEGATIVE
BLD GP AB SCN CELLS X3 SERPL QL: NORMAL
BNP SERPL-MCNC: 2787 PG/ML
BUN SERPL-MCNC: 20 MG/DL
BUN SERPL-MCNC: 31 MG/DL (ref 6–30)
CALCIUM SERPL-MCNC: 13.7 MG/DL
CHLORIDE SERPL-SCNC: 94 MMOL/L
CHLORIDE SERPL-SCNC: 98 MMOL/L (ref 95–110)
CLARITY UR REFRACT.AUTO: CLEAR
CO2 SERPL-SCNC: 24 MMOL/L
COLOR UR AUTO: YELLOW
CREAT SERPL-MCNC: 1.7 MG/DL (ref 0.5–1.4)
CREAT SERPL-MCNC: 1.8 MG/DL
DELSYS: ABNORMAL
DIFFERENTIAL METHOD: ABNORMAL
EOSINOPHIL # BLD AUTO: 0.1 K/UL
EOSINOPHIL NFR BLD: 1.3 %
ERYTHROCYTE [DISTWIDTH] IN BLOOD BY AUTOMATED COUNT: 15.8 %
EST. GFR  (AFRICAN AMERICAN): 44 ML/MIN/1.73 M^2
EST. GFR  (NON AFRICAN AMERICAN): 38.1 ML/MIN/1.73 M^2
GLUCOSE SERPL-MCNC: 123 MG/DL (ref 70–110)
GLUCOSE SERPL-MCNC: 265 MG/DL
GLUCOSE UR QL STRIP: ABNORMAL
HCO3 UR-SCNC: 27.5 MMOL/L (ref 24–28)
HCT VFR BLD AUTO: 41.7 %
HCT VFR BLD CALC: 48 %PCV (ref 36–54)
HGB BLD-MCNC: 14.3 G/DL
HGB UR QL STRIP: NEGATIVE
INR PPP: 1.2
KETONES UR QL STRIP: NEGATIVE
LACTATE SERPL-SCNC: 3.5 MMOL/L
LDH SERPL L TO P-CCNC: 2.83 MMOL/L (ref 0.5–2.2)
LEUKOCYTE ESTERASE UR QL STRIP: NEGATIVE
LIPASE SERPL-CCNC: 10 U/L
LYMPHOCYTES # BLD AUTO: 1.4 K/UL
LYMPHOCYTES NFR BLD: 24.6 %
MAGNESIUM SERPL-MCNC: 1.8 MG/DL
MAGNESIUM SERPL-MCNC: 1.9 MG/DL
MCH RBC QN AUTO: 29.1 PG
MCHC RBC AUTO-ENTMCNC: 34.3 %
MCV RBC AUTO: 85 FL
MODE: ABNORMAL
MONOCYTES # BLD AUTO: 0.8 K/UL
MONOCYTES NFR BLD: 14.3 %
NEUTROPHILS # BLD AUTO: 3.3 K/UL
NEUTROPHILS NFR BLD: 59.2 %
NITRITE UR QL STRIP: NEGATIVE
PCO2 BLDA: 41.5 MMHG (ref 35–45)
PH SMN: 7.43 [PH] (ref 7.35–7.45)
PH UR STRIP: 7 [PH] (ref 5–8)
PLATELET # BLD AUTO: 213 K/UL
PMV BLD AUTO: 11 FL
PO2 BLDA: 22 MMHG (ref 40–60)
POC BE: 3 MMOL/L
POC IONIZED CALCIUM: 1.07 MMOL/L (ref 1.06–1.42)
POC SATURATED O2: 39 % (ref 95–100)
POC TCO2 (MEASURED): 26 MMOL/L (ref 23–29)
POC TCO2: 29 MMOL/L (ref 24–29)
POTASSIUM BLD-SCNC: 6.6 MMOL/L (ref 3.5–5.1)
POTASSIUM SERPL-SCNC: 3.9 MMOL/L
PROT SERPL-MCNC: 7.3 G/DL
PROT UR QL STRIP: NEGATIVE
PROTHROMBIN TIME: 12.3 SEC
RBC # BLD AUTO: 4.91 M/UL
SAMPLE: ABNORMAL
SAMPLE: ABNORMAL
SITE: ABNORMAL
SODIUM BLD-SCNC: 132 MMOL/L (ref 136–145)
SODIUM SERPL-SCNC: 130 MMOL/L
SP GR UR STRIP: 1 (ref 1–1.03)
SP02: 97
TROPONIN I SERPL DL<=0.01 NG/ML-MCNC: 0.09 NG/ML
URN SPEC COLLECT METH UR: ABNORMAL
UROBILINOGEN UR STRIP-ACNC: 4 EU/DL
WBC # BLD AUTO: 5.53 K/UL

## 2017-07-18 PROCEDURE — 85730 THROMBOPLASTIN TIME PARTIAL: CPT | Mod: 91

## 2017-07-18 PROCEDURE — 93010 ELECTROCARDIOGRAM REPORT: CPT | Mod: 77,,, | Performed by: INTERNAL MEDICINE

## 2017-07-18 PROCEDURE — 25000003 PHARM REV CODE 250: Performed by: EMERGENCY MEDICINE

## 2017-07-18 PROCEDURE — 96375 TX/PRO/DX INJ NEW DRUG ADDON: CPT

## 2017-07-18 PROCEDURE — 93010 ELECTROCARDIOGRAM REPORT: CPT | Mod: 59,,, | Performed by: INTERNAL MEDICINE

## 2017-07-18 PROCEDURE — 96368 THER/DIAG CONCURRENT INF: CPT

## 2017-07-18 PROCEDURE — 86901 BLOOD TYPING SEROLOGIC RH(D): CPT

## 2017-07-18 PROCEDURE — 99152 MOD SED SAME PHYS/QHP 5/>YRS: CPT | Mod: ,,, | Performed by: INTERNAL MEDICINE

## 2017-07-18 PROCEDURE — 85610 PROTHROMBIN TIME: CPT

## 2017-07-18 PROCEDURE — 5A02210 ASSISTANCE WITH CARDIAC OUTPUT USING BALLOON PUMP, CONTINUOUS: ICD-10-PCS | Performed by: INTERNAL MEDICINE

## 2017-07-18 PROCEDURE — 99204 OFFICE O/P NEW MOD 45 MIN: CPT | Mod: S$PBB,,, | Performed by: INTERNAL MEDICINE

## 2017-07-18 PROCEDURE — 63600175 PHARM REV CODE 636 W HCPCS: Performed by: INTERNAL MEDICINE

## 2017-07-18 PROCEDURE — 93010 ELECTROCARDIOGRAM REPORT: CPT | Mod: ,,, | Performed by: INTERNAL MEDICINE

## 2017-07-18 PROCEDURE — 96365 THER/PROPH/DIAG IV INF INIT: CPT | Mod: 59

## 2017-07-18 PROCEDURE — 93005 ELECTROCARDIOGRAM TRACING: CPT

## 2017-07-18 PROCEDURE — 94620 PR PULMONARY STRESS TESTING,SIMPLE: CPT | Mod: PBBFAC | Performed by: INTERNAL MEDICINE

## 2017-07-18 PROCEDURE — 83605 ASSAY OF LACTIC ACID: CPT

## 2017-07-18 PROCEDURE — 86900 BLOOD TYPING SEROLOGIC ABO: CPT

## 2017-07-18 PROCEDURE — 83735 ASSAY OF MAGNESIUM: CPT

## 2017-07-18 PROCEDURE — 80299 QUANTITATIVE ASSAY DRUG: CPT

## 2017-07-18 PROCEDURE — 25000003 PHARM REV CODE 250: Performed by: STUDENT IN AN ORGANIZED HEALTH CARE EDUCATION/TRAINING PROGRAM

## 2017-07-18 PROCEDURE — 25000003 PHARM REV CODE 250: Performed by: INTERNAL MEDICINE

## 2017-07-18 PROCEDURE — 1159F MED LIST DOCD IN RCRD: CPT | Mod: ,,, | Performed by: INTERNAL MEDICINE

## 2017-07-18 PROCEDURE — 99285 EMERGENCY DEPT VISIT HI MDM: CPT | Mod: 25,27

## 2017-07-18 PROCEDURE — 25000242 PHARM REV CODE 250 ALT 637 W/ HCPCS: Performed by: EMERGENCY MEDICINE

## 2017-07-18 PROCEDURE — 99215 OFFICE O/P EST HI 40 MIN: CPT | Mod: S$PBB,,, | Performed by: INTERNAL MEDICINE

## 2017-07-18 PROCEDURE — 85730 THROMBOPLASTIN TIME PARTIAL: CPT

## 2017-07-18 PROCEDURE — 82803 BLOOD GASES ANY COMBINATION: CPT

## 2017-07-18 PROCEDURE — 27100094 HC IABP, SET-UP

## 2017-07-18 PROCEDURE — 63600175 PHARM REV CODE 636 W HCPCS: Performed by: STUDENT IN AN ORGANIZED HEALTH CARE EDUCATION/TRAINING PROGRAM

## 2017-07-18 PROCEDURE — 83690 ASSAY OF LIPASE: CPT

## 2017-07-18 PROCEDURE — 20000000 HC ICU ROOM

## 2017-07-18 PROCEDURE — 94640 AIRWAY INHALATION TREATMENT: CPT

## 2017-07-18 PROCEDURE — 99223 1ST HOSP IP/OBS HIGH 75: CPT | Mod: ,,, | Performed by: INTERNAL MEDICINE

## 2017-07-18 PROCEDURE — 93284 PRGRMG EVAL IMPLANTABLE DFB: CPT | Mod: 26,,, | Performed by: INTERNAL MEDICINE

## 2017-07-18 PROCEDURE — 99900035 HC TECH TIME PER 15 MIN (STAT)

## 2017-07-18 PROCEDURE — 99999 PR PBB SHADOW E&M-EST. PATIENT-LVL III: CPT | Mod: PBBFAC,,, | Performed by: INTERNAL MEDICINE

## 2017-07-18 PROCEDURE — 36556 INSERT NON-TUNNEL CV CATH: CPT | Mod: ,,, | Performed by: INTERNAL MEDICINE

## 2017-07-18 PROCEDURE — 25000003 PHARM REV CODE 250

## 2017-07-18 PROCEDURE — 63600175 PHARM REV CODE 636 W HCPCS

## 2017-07-18 PROCEDURE — 84484 ASSAY OF TROPONIN QUANT: CPT

## 2017-07-18 PROCEDURE — 1126F AMNT PAIN NOTED NONE PRSNT: CPT | Mod: ,,, | Performed by: INTERNAL MEDICINE

## 2017-07-18 PROCEDURE — 83880 ASSAY OF NATRIURETIC PEPTIDE: CPT

## 2017-07-18 PROCEDURE — 99291 CRITICAL CARE FIRST HOUR: CPT | Mod: ,,, | Performed by: EMERGENCY MEDICINE

## 2017-07-18 PROCEDURE — 94620 PR PULMONARY STRESS TESTING,SIMPLE: CPT | Mod: 26,S$PBB,, | Performed by: INTERNAL MEDICINE

## 2017-07-18 PROCEDURE — 63600175 PHARM REV CODE 636 W HCPCS: Performed by: EMERGENCY MEDICINE

## 2017-07-18 PROCEDURE — 96367 TX/PROPH/DG ADDL SEQ IV INF: CPT

## 2017-07-18 PROCEDURE — 85025 COMPLETE CBC W/AUTO DIFF WBC: CPT

## 2017-07-18 PROCEDURE — 93284 PRGRMG EVAL IMPLANTABLE DFB: CPT

## 2017-07-18 PROCEDURE — 33967 INSERT I-AORT PERCUT DEVICE: CPT | Mod: ,,, | Performed by: INTERNAL MEDICINE

## 2017-07-18 PROCEDURE — 83735 ASSAY OF MAGNESIUM: CPT | Mod: 91

## 2017-07-18 PROCEDURE — 02HV33Z INSERTION OF INFUSION DEVICE INTO SUPERIOR VENA CAVA, PERCUTANEOUS APPROACH: ICD-10-PCS | Performed by: INTERNAL MEDICINE

## 2017-07-18 PROCEDURE — 51702 INSERT TEMP BLADDER CATH: CPT

## 2017-07-18 PROCEDURE — C1751 CATH, INF, PER/CENT/MIDLINE: HCPCS

## 2017-07-18 PROCEDURE — 96366 THER/PROPH/DIAG IV INF ADDON: CPT

## 2017-07-18 PROCEDURE — 93010 ELECTROCARDIOGRAM REPORT: CPT | Mod: 76,,, | Performed by: INTERNAL MEDICINE

## 2017-07-18 PROCEDURE — 80053 COMPREHEN METABOLIC PANEL: CPT

## 2017-07-18 PROCEDURE — 81003 URINALYSIS AUTO W/O SCOPE: CPT

## 2017-07-18 PROCEDURE — 99999 PR PBB SHADOW E&M-EST. PATIENT-LVL IV: CPT | Mod: PBBFAC,,, | Performed by: INTERNAL MEDICINE

## 2017-07-18 RX ORDER — SUCCINYLCHOLINE CHLORIDE 20 MG/ML
INJECTION INTRAMUSCULAR; INTRAVENOUS
Status: DISCONTINUED
Start: 2017-07-18 | End: 2017-07-18 | Stop reason: WASHOUT

## 2017-07-18 RX ORDER — PRAVASTATIN SODIUM 20 MG/1
20 TABLET ORAL NIGHTLY
Status: DISCONTINUED | OUTPATIENT
Start: 2017-07-18 | End: 2017-08-09

## 2017-07-18 RX ORDER — ETOMIDATE 2 MG/ML
INJECTION INTRAVENOUS
Status: DISCONTINUED
Start: 2017-07-18 | End: 2017-07-18 | Stop reason: WASHOUT

## 2017-07-18 RX ORDER — POLYETHYLENE GLYCOL 3350 17 G/17G
17 POWDER, FOR SOLUTION ORAL DAILY
Status: DISCONTINUED | OUTPATIENT
Start: 2017-07-18 | End: 2017-07-27

## 2017-07-18 RX ORDER — DEXTROSE 50 % IN WATER (D50W) INTRAVENOUS SYRINGE
25
Status: COMPLETED | OUTPATIENT
Start: 2017-07-18 | End: 2017-07-18

## 2017-07-18 RX ORDER — DOBUTAMINE HYDROCHLORIDE 400 MG/100ML
5 INJECTION INTRAVENOUS CONTINUOUS
Status: DISCONTINUED | OUTPATIENT
Start: 2017-07-18 | End: 2017-07-27

## 2017-07-18 RX ORDER — SODIUM BICARBONATE 1 MEQ/ML
50 SYRINGE (ML) INTRAVENOUS
Status: COMPLETED | OUTPATIENT
Start: 2017-07-18 | End: 2017-07-18

## 2017-07-18 RX ORDER — HEPARIN SODIUM,PORCINE/D5W 25000/250
17 INTRAVENOUS SOLUTION INTRAVENOUS CONTINUOUS
Status: DISCONTINUED | OUTPATIENT
Start: 2017-07-18 | End: 2017-07-27

## 2017-07-18 RX ORDER — FUROSEMIDE 10 MG/ML
80 INJECTION INTRAMUSCULAR; INTRAVENOUS ONCE
Status: COMPLETED | OUTPATIENT
Start: 2017-07-18 | End: 2017-07-18

## 2017-07-18 RX ORDER — PROPOFOL 10 MG/ML
INJECTION, EMULSION INTRAVENOUS
Status: DISCONTINUED
Start: 2017-07-18 | End: 2017-07-18 | Stop reason: WASHOUT

## 2017-07-18 RX ORDER — ROCURONIUM BROMIDE 10 MG/ML
INJECTION, SOLUTION INTRAVENOUS
Status: DISCONTINUED
Start: 2017-07-18 | End: 2017-07-18 | Stop reason: WASHOUT

## 2017-07-18 RX ORDER — ALBUTEROL SULFATE 0.83 MG/ML
2.5 SOLUTION RESPIRATORY (INHALATION)
Status: DISCONTINUED | OUTPATIENT
Start: 2017-07-18 | End: 2017-07-18

## 2017-07-18 RX ORDER — AMIODARONE HYDROCHLORIDE 150 MG/3ML
150 INJECTION, SOLUTION INTRAVENOUS
Status: ACTIVE | OUTPATIENT
Start: 2017-07-18 | End: 2017-07-18

## 2017-07-18 RX ORDER — SODIUM CHLORIDE 0.9 % (FLUSH) 0.9 %
3 SYRINGE (ML) INJECTION EVERY 8 HOURS
Status: DISCONTINUED | OUTPATIENT
Start: 2017-07-18 | End: 2017-07-27

## 2017-07-18 RX ORDER — FUROSEMIDE 10 MG/ML
60 INJECTION INTRAMUSCULAR; INTRAVENOUS
Status: COMPLETED | OUTPATIENT
Start: 2017-07-18 | End: 2017-07-18

## 2017-07-18 RX ORDER — ONDANSETRON 2 MG/ML
8 INJECTION INTRAMUSCULAR; INTRAVENOUS EVERY 8 HOURS PRN
Status: DISCONTINUED | OUTPATIENT
Start: 2017-07-18 | End: 2017-07-27

## 2017-07-18 RX ORDER — HEPARIN SODIUM 5000 [USP'U]/ML
5000 INJECTION, SOLUTION INTRAVENOUS; SUBCUTANEOUS EVERY 8 HOURS
Status: DISCONTINUED | OUTPATIENT
Start: 2017-07-18 | End: 2017-07-18

## 2017-07-18 RX ORDER — SENNOSIDES 8.6 MG/1
8.6 TABLET ORAL DAILY
Status: DISCONTINUED | OUTPATIENT
Start: 2017-07-18 | End: 2017-07-27

## 2017-07-18 RX ORDER — ALBUTEROL SULFATE 0.83 MG/ML
10 SOLUTION RESPIRATORY (INHALATION)
Status: COMPLETED | OUTPATIENT
Start: 2017-07-18 | End: 2017-07-18

## 2017-07-18 RX ADMIN — SODIUM BICARBONATE 50 MEQ: 84 INJECTION, SOLUTION INTRAVENOUS at 12:07

## 2017-07-18 RX ADMIN — DEXTROSE MONOHYDRATE 25 G: 25 INJECTION, SOLUTION INTRAVENOUS at 12:07

## 2017-07-18 RX ADMIN — ALBUTEROL SULFATE 10 MG: 2.5 SOLUTION RESPIRATORY (INHALATION) at 12:07

## 2017-07-18 RX ADMIN — INSULIN HUMAN 5 UNITS: 100 INJECTION, SOLUTION PARENTERAL at 12:07

## 2017-07-18 RX ADMIN — CALCIUM GLUCONATE 1 G: 94 INJECTION, SOLUTION INTRAVENOUS at 01:07

## 2017-07-18 RX ADMIN — AMIODARONE HYDROCHLORIDE 150 MG: 1.5 INJECTION, SOLUTION INTRAVENOUS at 11:07

## 2017-07-18 RX ADMIN — FUROSEMIDE 80 MG: 10 INJECTION, SOLUTION INTRAVENOUS at 11:07

## 2017-07-18 RX ADMIN — AMIODARONE HYDROCHLORIDE 1 MG/MIN: 1.8 INJECTION, SOLUTION INTRAVENOUS at 11:07

## 2017-07-18 RX ADMIN — FUROSEMIDE 10 MG/HR: 10 INJECTION, SOLUTION INTRAVENOUS at 02:07

## 2017-07-18 RX ADMIN — ONDANSETRON 8 MG: 2 INJECTION INTRAMUSCULAR; INTRAVENOUS at 09:07

## 2017-07-18 RX ADMIN — POLYETHYLENE GLYCOL 3350 17 G: 17 POWDER, FOR SOLUTION ORAL at 08:07

## 2017-07-18 RX ADMIN — DOBUTAMINE HYDROCHLORIDE 5 MCG/KG/MIN: 400 INJECTION INTRAVENOUS at 02:07

## 2017-07-18 RX ADMIN — PRAVASTATIN SODIUM 20 MG: 20 TABLET ORAL at 09:07

## 2017-07-18 RX ADMIN — FUROSEMIDE 60 MG: 10 INJECTION, SOLUTION INTRAVENOUS at 12:07

## 2017-07-18 RX ADMIN — SENNOSIDES 8.6 MG: 8.6 TABLET, FILM COATED ORAL at 08:07

## 2017-07-18 RX ADMIN — HEPARIN SODIUM AND DEXTROSE 17 UNITS/KG/HR: 10000; 5 INJECTION INTRAVENOUS at 07:07

## 2017-07-18 NOTE — SUBJECTIVE & OBJECTIVE
Past Medical History:   Diagnosis Date    Cardiomyopathy     Hyperlipidemia     Hypertension     Obesity     S/P implantation of automatic cardioverter/defibrillator (AICD)     Ventricular tachycardia        Past Surgical History:   Procedure Laterality Date    CARDIAC CATHETERIZATION      CARDIAC DEFIBRILLATOR PLACEMENT         Review of patient's allergies indicates:  No Known Allergies    No current facility-administered medications on file prior to encounter.      Current Outpatient Prescriptions on File Prior to Encounter   Medication Sig    amiodarone (PACERONE) 200 MG Tab Take by mouth once daily.    aspirin 81 MG Chew Take 81 mg by mouth once daily.    DOBUTamine (DOBUTREX) 1,000 mg/250 mL (4,000 mcg/mL) infusion Inject 414 mcg/min into the vein continuous.    furosemide (LASIX) 40 MG tablet Take 1 tablet (40 mg total) by mouth 2 (two) times daily.    potassium chloride SA (K-DUR,KLOR-CON) 20 MEQ tablet Take 20 mEq by mouth once daily.     pravastatin (PRAVACHOL) 20 MG tablet Take 20 mg by mouth every evening.    spironolactone (ALDACTONE) 25 MG tablet Take 1 tablet (25 mg total) by mouth once daily.    [DISCONTINUED] duloxetine (CYMBALTA) 60 MG capsule Take 60 mg by mouth once daily.     Family History     None        Social History Main Topics    Smoking status: Never Smoker    Smokeless tobacco: Never Used    Alcohol use No    Drug use: Unknown    Sexual activity: Not on file     ROS   Gen: denies fever chills + fatigue  HEENT: denies HA, blurry vision  Resp: denies SOB, cough  CV: Denies CP, palps, +orthopnea, +PND, +edema  GI: denies abd pain, nausea/vomiting, diarrhea    Objective:     Vital Signs (Most Recent):  Temp: 97.5 °F (36.4 °C) (07/18/17 1117)  Pulse: 60 (07/18/17 1432)  Resp: 16 (07/18/17 1432)  BP: 116/69 (07/18/17 1432)  SpO2: 96 % (07/18/17 1432) Vital Signs (24h Range):  Temp:  [97.2 °F (36.2 °C)-97.5 °F (36.4 °C)] 97.5 °F (36.4 °C)  Pulse:  [] 60  Resp:   [16-40] 16  SpO2:  [96 %-100 %] 96 %  BP: (106-133)/(64-81) 116/69     Weight: 79.8 kg (176 lb)  Body mass index is 26.76 kg/m².    SpO2: 96 %  O2 Device (Oxygen Therapy): room air    Physical Exam   Gen: no acute distress, alert & oriented x 3  Neck: +14cm JVD, no carotid bruits  CV: regular rate and rhythm, no murmurs, rubs, gallops  Resp: clear to auscultation bilaterally, normal effort  GI: soft, nontender nondistended, normal bowel sounds  Ext: cool extremities, +1 edema, no clubbing or cyanosis      Significant Labs:   CMP:   Recent Labs  Lab 07/18/17  0725 07/18/17  1350   * 130*   K 4.4 3.9   CL 97 94*   CO2 22* 24   * 265*   BUN 19 20   CREATININE 1.7* 1.8*   CALCIUM 10.5 13.7*   PROT 8.3 7.3   ALBUMIN 3.7 3.3*   BILITOT 2.5* 2.2*   ALKPHOS 66 54*   AST 20 17   ALT 13 13   ANIONGAP 16 12   ESTGFRAFRICA 47.2* 44.0*   EGFRNONAA 40.8* 38.1*   , CBC:   Recent Labs  Lab 07/18/17  1138 07/18/17  1149   WBC 5.53  --    HGB 14.3  --    HCT 41.7 48     --    , INR:   Recent Labs  Lab 07/18/17  1138   INR 1.2   , Troponin   Recent Labs  Lab 07/18/17  1250   TROPONINI 0.092*   , All pertinent lab results from the last 24 hours have been reviewed. and lactate 3.5    Significant Imaging: Echocardiogram:   2D echo with color flow doppler:   Results for orders placed or performed during the hospital encounter of 07/03/17   2D echo with color flow doppler   Result Value Ref Range    EF 10 (A) 55 - 65    Mitral Valve Regurgitation MILD     Diastolic Dysfunction Yes (A)     Est. PA Systolic Pressure 62.06 (A)     Tricuspid Valve Regurgitation MILD     and   Interrogation: Multiple runs of atrial tachycardia (appear organized resembling atrial flutter) with ventricular tachycardia requiring several appropriate shocks for VT

## 2017-07-18 NOTE — LETTER
July 18, 2017      Ortega Leos MD  [address] [facility] [address]           [facility] - Hepatology  [address]  [place] [address]  Phone: [phone]  Fax: [phone]          Patient: Suman Hayden   MR Number: [id number]   YOB: 1950   Date of Visit: 7/18/2017       Dear Dr. Ortega Leos:    Thank you for referring Suman Hayden to me for evaluation. Attached you will find relevant portions of my assessment and plan of care.    If you have questions, please do not hesitate to call me. I look forward to following Suman Hayden along with you.    Sincerely,    Darrell Del Rosario MD    Enclosure  CC:  No Recipients    If you would like to receive this communication electronically, please contact [email or web address] or [phone] to request more information on [employer] [facility] access.    For providers and/or their staff who would like to refer a patient to [facility], please contact us through our one-stop-shop provider referral line, [facility], at [phone].    If you feel you have received this communication in error or would no longer like to receive these types of communications, please e-mail [email or web address]

## 2017-07-18 NOTE — ED NOTES
Lab called about critical value- Calcium 13.7, HTS made aware and informed that patient now in cath lab holding.

## 2017-07-18 NOTE — LETTER
July 18, 2017        Joe Ernst  [address] [facility] CARDIOLOGY [facility] [address]  Phone: [phone]  Fax: [phone]             [facility] [address]  [facility] [address]  Phone: [phone]   Patient: Suman Hayden   MR Number: [id number]   YOB: 1950   Date of Visit: 7/18/2017       Dear Dr. Joe Ernst    Thank you for referring Suman Hayden to me for evaluation. Attached you will find relevant portions of my assessment and plan of care.    If you have questions, please do not hesitate to call me. I look forward to following Suman Hayden along with you.    Sincerely,    Ortega Leos MD    Enclosure    If you would like to receive this communication electronically, please contact [email or web address] or [phone] to request [employer] [facility] access.    [employer] is a tool which provides read-only access to select patient information with whom you have a relationship. Its easy to use and provides real time access to review your patients record including encounter summaries, notes, results, and demographic information.    If you feel you have received this communication in error or would no longer like to receive these types of communications, please e-mail [email or web address]

## 2017-07-18 NOTE — ASSESSMENT & PLAN NOTE
See hepatology note May not be able to get liver biopsy in a timely fashion as recommended by hepatology

## 2017-07-18 NOTE — ED NOTES
Patient identifiers have been checked and are correct.  Patient assisted to ED stretcher and placed in a hospital gown.     LOC: The patient is awake, alert, and aware of environment. The patient is oriented x 3 and speaking appropriately.   APPEARANCE: No acute distress noted.   PSYCHOSOCIAL: Patient is calm and cooperative.   SKIN: The skin is warm, dry.  RESPIRATORY: Airway is open and patent. Bilateral chest rise and fall. Respirations are spontaneous, even and unlabored. Normal effort and rate noted. No accessory muscle use noted. Denies any SOB currently.   CARDIAC: Paced rhythm noted on continuous cardiac monitor. Denies any current CP. AICD noted.   ABDOMEN: Soft and non tender to palpation. No distention noted.   NEUROLOGIC: Eyes open spontaneously. Speech clear. Tolerating saliva secretions well. Able to follow commands. Symmetrical facial muscles. Moving all extremities. Movement is purposeful.   MUSCULOSKELETAL: No obvious deformities noted.   Dobutamine infusing to PICC @ 5mcg/kg/min, dosing weight 94 kg    Side rails up x2. Call light within reach. Family at bedside. Will continue to monitor. Fall risk band placed to right arm.

## 2017-07-18 NOTE — ASSESSMENT & PLAN NOTE
Mr. Hayden is a 67 year old man with advanced NICM (LVEF 10%) awaiting LVAD, HTN HLD who presented with syncope and ICD discharge for VT. Underlying his VT, he also has an organized atrial tachycardia which appears to be new from his EGM since May 2017. He is acutely ill and would benefit from aggressive AAD therapy and treatment of his decompensated heart failure. Furthermore, with newly discovered atrial tachyarrhythmia, he would benefit from full anticoagulation in conjunction with AAD therapy. He has started amiodarone IV load this afternoon.    --continue full IV amiodarone load; plan on transition to PO amiodarone 400mg BID tomorrow x 2 weeks then likely 400mg daily thereafter  --initiate heparin gtt to prevent RUTH thrombus and CVA given atrial tachyarrhythmia and high CVA risk  --will discuss more definitive arrhythmia strategies once he is stabilized.

## 2017-07-18 NOTE — ED TRIAGE NOTES
"Sent from clinic after patient's defib went off while doing a 6 mile walk. Wife states he passed out and she saw defib shock patient. Wife reports pt passed out for "less than seconds." Pt denies any CP but did report SOB after event. Arrives on home infusion of dobutamine infusing to right upper arm PICC.   "

## 2017-07-18 NOTE — NURSING
Pt arrived to unit 1800 with Amiodarone, Lasix, and Dobutamine infusing. Pt with balloon pump functioning without incident. Site with no s/s of complications. Pt with PIV x2, PICC and TLC present. TLC placement pending. Not being used currrently. Pt with Dobutamine infusing with a dosing weight at 94kg with current weight noted at 79.8kg. Dr. Newman notified and instructed to continue as ordered. Amiodarone new dosage noted and changed as ordered. Pt connected to ICU monitoring. Pt wife at bedside. Will con't to monitor.

## 2017-07-18 NOTE — ED PROVIDER NOTES
Encounter Date: 7/18/2017    SCRIBE #1 NOTE: I, Hamzah Cobian, am scribing for, and in the presence of,  Dr. Cruz. I have scribed the entire note.       History     Chief Complaint   Patient presents with    AICD Problem     on dobutamine drip was on 6 min walk , got lightheaded and dizzy, sat down ? seizure jerks, bubbling of lips, chest jerked where AICD is located,       HPI  Time patient was seen by the provider: 11:29 AM      The patient is a 67 y.o. male with hx of: HLD, cardiomyopathy, HTN, s/p implantation of AICD that presents to the ED after he was taking a walk earlier today when he began feeling lightheaded and had to sit down. He then reportedly had a syncopal episode and his chest jerked where his AICD is located. He denies feeling an shocks from the AICD. No lightheadedness currently.    Review of patient's allergies indicates:    The history is provided by the patient and the spouse.  No Known Allergies  Past Medical History:   Diagnosis Date    Cardiomyopathy     Hyperlipidemia     Hypertension     Obesity     S/P implantation of automatic cardioverter/defibrillator (AICD)     Ventricular tachycardia      Past Surgical History:   Procedure Laterality Date    CARDIAC CATHETERIZATION      CARDIAC DEFIBRILLATOR PLACEMENT       History reviewed. No pertinent family history.  Social History   Substance Use Topics    Smoking status: Never Smoker    Smokeless tobacco: Never Used    Alcohol use No     Review of Systems   Cardiovascular: Positive for palpitations.   Neurological: Positive for light-headedness.   All other systems reviewed and are negative.      Physical Exam     Initial Vitals [07/18/17 1117]   BP Pulse Resp Temp SpO2   133/81 100 18 97.5 °F (36.4 °C) 99 %      MAP       98.33         Physical Exam  Gen/Constitutional: Interactive. No acute distress  Head: Normocephalic, Atraumatic  Neck: supple, no masses or LAD  Eyes: PERRLA, conjunctiva clear  Ears, Nose and Throat: No  rhinorrhea or stridor.  Cardiac: Irregularly irregular Rhythm, No murmur, AICD palpable on chest wall   Pulmonary: CTA Bilat, no wheezes, rhonchi, rales.  GI: Abdomen soft, non-tender, non-distended; no rebound or guarding  : No CVA tenderness.  Musculoskeletal: Extremities warm, well perfused, no erythema, no edema  Skin: No rashes  Neuro: Alert and Oriented x 3; No focal motor or sensory deficits.    Psych: Normal affect    ED Course   Procedures  Labs Reviewed   CBC W/ AUTO DIFFERENTIAL - Abnormal; Notable for the following:        Result Value    RDW 15.8 (*)     All other components within normal limits   B-TYPE NATRIURETIC PEPTIDE - Abnormal; Notable for the following:     BNP 2,787 (*)     All other components within normal limits   LACTIC ACID, PLASMA - Abnormal; Notable for the following:     Lactate (Lactic Acid) 3.5 (*)     All other components within normal limits   URINALYSIS, REFLEX TO URINE CULTURE - Abnormal; Notable for the following:     Glucose, UA 1+ (*)     All other components within normal limits    Narrative:     Preferred Collection Type->Urine, Clean Catch   TROPONIN I - Abnormal; Notable for the following:     Troponin I 0.092 (*)     All other components within normal limits   COMPREHENSIVE METABOLIC PANEL - Abnormal; Notable for the following:     Sodium 130 (*)     Chloride 94 (*)     Glucose 265 (*)     Creatinine 1.8 (*)     Calcium 13.7 (*)     Albumin 3.3 (*)     Total Bilirubin 2.2 (*)     Alkaline Phosphatase 54 (*)     eGFR if  44.0 (*)     eGFR if non  38.1 (*)     All other components within normal limits   ISTAT PROCEDURE - Abnormal; Notable for the following:     POC Glucose 123 (*)     POC BUN 31 (*)     POC Creatinine 1.7 (*)     POC Sodium 132 (*)     POC Potassium 6.6 (*)     All other components within normal limits   ISTAT PROCEDURE - Abnormal; Notable for the following:     POC PO2 22 (*)     POC SATURATED O2 39 (*)     POC Lactate  2.83 (*)     All other components within normal limits   APTT   PROTIME-INR   MAGNESIUM   MAGNESIUM   LIPASE   AMIODARONE & METABOLITE   TYPE & SCREEN   ISTAT CHEM8     X-Ray Chest 1 View   Final Result    No detrimental change since 7/6/17.         Electronically signed by: AYUSH MCKEON MD   Date:     07/18/17   Time:    13:00           EKG Readings: (Independently Interpreted)   EKG: atrial paced rhythm, RBBB, twave inversion in II, III, and AVF, no BYRON's, ST depressions in V1-V4, , no STEMI   Other EKG Interpretations: Repeat EKG: atrial paced rhythm, twave inversion in II, III, and AVF, ST depression in V1-V3, , no BYRON's, no STEMI        MDM  Clinical Tests:  Labs Test(s) were ordered and reviewed by me.  Radiological study(s) were ordered and reviewed by me.  Medical test(s) were ordered and reviewed by me.      Pt presents after syncopal episode where his ICD fired. Cardiology is at the bedside and will interrogate his ICD. They recommend amiodarone load and drip. I considered cardiac dysthymia, PE, MI/ACS, sepsis, electrolyte abnormality among other diagnoses. Cardiology accepted for admission to cardiac ICU for monitoring.     Pt also noted to be hyperkalemic with prolonged QTC on EKG.  Given insulin, Glucose, Ca, HCO3 and alb neb for K shift.  Cards aware.  Repeat K after treatment 3.9.      Critical Care Procedure Note:  Direct patient critical care time: 10 minutes  Additional history critical care time:10 minutes  Ordering / reviewing labs and studies: critical care time:10 minutes  Documentation critical care time: 10 minutes  Consulting other physicians critical care time: 10 minutes  Total critical care time (exclusive of procedural time) : 50 minutes   Reason for Critical Care: cardiac dysrhythmia                       ED Course     Clinical Impression:   The primary encounter diagnosis was AICD discharge. Diagnoses of Syncope and collapse, CHF (congestive heart failure), and CHF  (NYHA class IV, ACC/AHA stage D) were also pertinent to this visit.                           Dc Cruz MD  07/18/17 1107

## 2017-07-18 NOTE — ASSESSMENT & PLAN NOTE
Would like to admit due to ICD shock in setting of cardiogenic shock  Suspect he may need more support to see if end organ damage improves

## 2017-07-18 NOTE — PROGRESS NOTES
"    Post Cath Note  Referring Physician: Dc Cruz MD  Procedure: INSERTION-INTRAAORTIC BALLOON PUMP (IABP) (N/A)       Access: Left CFA, Left IJV    Successful LEFT CFA IABP and LEFT IJV TLC placement     See full report for further details    Intervention:   NONE    Post Cath Exam:   /69   Pulse 60   Temp 97.5 °F (36.4 °C) (Oral)   Resp 16   Ht 5' 8" (1.727 m)   Wt 79.8 kg (176 lb)   SpO2 96%   BMI 26.76 kg/m²   No unusual pain, hematoma, thrill or bruit at vascular access site.  Distal pulse present without signs of ischemia.    Recommendations:   - S/p LEFT CFA IABP and LEFT IJV TLC  - Management per HTS    Signed:  Maurice Ramos DO  Cardiology Fellow  Pager [phone]          "

## 2017-07-18 NOTE — CONSULTS
The random liver biopsy can be performed by Ultrasound department tomorrow; the order needs to be put in by primary team  once the patient has inpatient status. Please keep pt NPO after midnight.     Yuan Sarmiento M.D. 2:35 PM 7/18/2017

## 2017-07-18 NOTE — PROGRESS NOTES
Subjective:    Patient ID:  Suman Hayden is a 67 y.o. male who presents for follow-up of Heart Transplant Pre-evaluation    ADMIT NOTE  HPI  CHF since 2006 who has had progressive decline since Jan 2017 who was admitted in early July 2017 for OHT / LVAD evaluation .  He was started on dobutamine 5 mcg/kg/min during that admit.  Approved for DT LVAD pending liver biopsy and clearance by hepatology as he was declined for transplant due to comorbidities that despite transplant would limit long term survivial (elevated pulmonary pressures and active Hepatitis B ).     Today, feels better than prior to starting dobutamine.  Christopher to walk in from parking lot without problems.  Sleeps on two pillows rather than three as he did on his initial visit.  No edema with a 15 kg weight loss.  T ani remains elevated today at 2.5 with an increase in creatinine to 1.7.    Seen by hepatology today who suspect he has no significant liver disease. Suspect HBV DNA false positive (see there note)  They did agree that he we could pursue liver biopsy due to elevated bilirubin and perisplenic fluid.  After my visit, he was had VT during his six minute walk.  Device interogation showed 5 shocks in the last few days and 11 total episodes.   After visit his wife describes a similar episode of near syncope yesterday while he was at [facility]. Both episodes were associated with diaphoresis and prodrome.      Review of Systems   Constitution: Negative for decreased appetite, weight gain and weight loss.   Cardiovascular: Negative for chest pain, dyspnea on exertion, leg swelling, near-syncope, orthopnea and palpitations.   Respiratory: Negative for cough and shortness of breath.    Musculoskeletal: Negative for myalgias.   Gastrointestinal: Negative for jaundice.        Objective:    Physical Exam   Constitutional: He is oriented to person, place, and time. He appears well-developed and well-nourished. He is active. He is not intubated.   /68    "Pulse (!) 59   Ht 5' 8" (1.727 m)   Wt 80.8 kg (178 lb 2.1 oz)   BMI 27.08 kg/m²      HENT:   Head: Normocephalic and atraumatic. Hair is normal.   Right Ear: External ear normal.   Left Ear: External ear normal.   Nose: Nose normal. No nasal deformity. No epistaxis.  No foreign bodies.   Mouth/Throat: Mucous membranes are normal. Mucous membranes are not cyanotic. No oropharyngeal exudate.   Eyes: Conjunctivae and EOM are normal. Pupils are equal, round, and reactive to light.   Neck: Neck supple. No hepatojugular reflux and no JVD present.   Cardiovascular: Normal rate, regular rhythm, normal heart sounds and normal pulses.  Exam reveals no gallop.    Pulmonary/Chest: Effort normal and breath sounds normal. No apnea and no tachypnea. He is not intubated. No respiratory distress. He exhibits no tenderness.   Abdominal: Soft. Normal appearance and bowel sounds are normal. There is no tenderness. No hernia.   Musculoskeletal: Normal range of motion.   Neurological: He is alert and oriented to person, place, and time. He displays no seizure activity.   Skin: Skin is warm, dry and intact. No rash noted. No pallor.   Psychiatric: He has a normal mood and affect. His speech is normal and behavior is normal. Thought content normal. Cognition and memory are normal.         Assessment:       1. Hepatitis B core antibody positive since 2012    2. CHF (NYHA class IV, ACC/AHA stage D)    3. Hyperbilirubinemia         Plan:       Hepatitis B core antibody positive since 2012  Pt / wife understands that significant liver pathology will preclude cardiac surgery    CHF (NYHA class IV, ACC/AHA stage D)  Would like to admit due to ICD shock in setting of cardiogenic shock  Suspect he may need more support to see if end organ damage improves    Hyperbilirubinemia  See hepatology note May not be able to get liver biopsy in a timely fashion as recommended by hepatology                  "

## 2017-07-18 NOTE — H&P
Heart Failure and Transplant Service Admission Note  Attending Physician: Dc Cruz MD  Chief Complaint: ICD shocks, ADHF     HPI:   67 y.o. gentleman with PMH of NICM (EF 10%) on home  at 5 mcg/kg/min, esophagitis, HTN, HLD, s/p Medtronic CRT-D who presented to the hospital after undergoing a 6MWT and developed syncope followed by ICD shocks x 2. He reports not feeling any of them.. He has already been worked up for LVAD/OHTx and was in the process of getting a VAD however, he ended up recently being diagnosed with Hep B. He was evaluated by Hepatology who wants to repeat viral studies/US and also wants him to undergo liver biopsy.     He was seen by Dr. Leos earlier during the month and was also admitted for ADHF. He was sent home on  5 mcg/kg/min and lasix 40 mg BID. He noted increased SOB (NYHA Class III), 3 pillow orthopnea, decreased appetite and generalized weakness. He reports compliance with low salt diet and medication regimen. He reports being hospitalized 6 times in the last year in [facility] for ADHF. He reports having a recent LHC which showed clean coronaries (as per patient).     Device interrogation revealed ICD shock x 5.  7/14 x1, 7/17 x2, 7/18 x2    ROS:    Constitution: Negative for fever, chills, weight loss or gain.   HENT: Negative for sore throat, rhinorrhea, or headache.  Eyes: Negative for blurred or double vision.   Cardiovascular: See above  Pulmonary: Positive for SOB   Gastrointestinal: Negative for abdominal pain, nausea, vomiting, or diarrhea.   : Negative for dysuria.   Neurological: Negative for focal weakness or sensory changes.  PMH:     Past Medical History:   Diagnosis Date    Cardiomyopathy     Hyperlipidemia     Hypertension     Obesity     S/P implantation of automatic cardioverter/defibrillator (AICD)     Ventricular tachycardia      Past Surgical History:   Procedure Laterality Date    CARDIAC CATHETERIZATION      CARDIAC  "DEFIBRILLATOR PLACEMENT       Allergies:   Review of patient's allergies indicates:  No Known Allergies  Medications:     No current facility-administered medications on file prior to encounter.      Current Outpatient Prescriptions on File Prior to Encounter   Medication Sig Dispense Refill    amiodarone (PACERONE) 200 MG Tab Take by mouth once daily.      aspirin 81 MG Chew Take 81 mg by mouth once daily.      DOBUTamine (DOBUTREX) 1,000 mg/250 mL (4,000 mcg/mL) infusion Inject 414 mcg/min into the vein continuous. 500 mL 11    furosemide (LASIX) 40 MG tablet Take 1 tablet (40 mg total) by mouth 2 (two) times daily. 180 tablet 3    potassium chloride SA (K-DUR,KLOR-CON) 20 MEQ tablet Take 20 mEq by mouth once daily.       pravastatin (PRAVACHOL) 20 MG tablet Take 20 mg by mouth every evening.      spironolactone (ALDACTONE) 25 MG tablet Take 1 tablet (25 mg total) by mouth once daily. 90 tablet 3    [DISCONTINUED] duloxetine (CYMBALTA) 60 MG capsule Take 60 mg by mouth once daily.         Inpatient Medications   Continuous Infusions:   DOBUTamine      furosemide (LASIX) 5 mg/mL infusion (non-titrating)       Scheduled Meds:   amiodarone  150 mg Intravenous ED 1 Time    calcium gluconate 1g in dextrose 5% 100mL (ready to mix system)  1 g Intravenous ED 1 Time    heparin (porcine)  5,000 Units Subcutaneous Q8H    pravastatin  20 mg Oral QHS    sodium chloride 0.9%  3 mL Intravenous Q8H     PRN Meds:     Social History:     Social History   Substance Use Topics    Smoking status: Never Smoker    Smokeless tobacco: Never Used    Alcohol use No     Family History:   History reviewed. No pertinent family history.  Physical Exam:     Vitals:  Temp:  [97.2 °F (36.2 °C)-97.5 °F (36.4 °C)]   Pulse:  []   Resp:  [18-40]   BP: (106-133)/(64-81)   SpO2:  [96 %-100 %]     /64   Pulse 60   Temp 97.5 °F (36.4 °C) (Oral)   Resp 20   Ht 5' 8" (1.727 m)   Wt 79.8 kg (176 lb)   SpO2 100%   BMI " "26.76 kg/m²   I/O's:  No intake or output data in the 24 hours ending 07/18/17 1349    Wt Readings from Last 10 Encounters:   07/18/17 79.8 kg (176 lb)   07/18/17 79.8 kg (176 lb)   07/18/17 80.5 kg (177 lb 7.5 oz)   07/18/17 80.8 kg (178 lb 2.1 oz)   07/09/17 82.8 kg (182 lb 8.7 oz)   06/27/17 95.9 kg (211 lb 6.7 oz)   06/27/17 96.2 kg (212 lb 1.3 oz)        Constitutional: NAD, conversant  HEENT: Sclera anicteric, PERRLA, EOMI  Neck: Positive JVD up to angle of jaw, no carotid bruits  CV: RRR, no murmur, normal S1/S2, +S3, +S4, No Pericardial rub  Pulm: CTAB with no wheezes, rales, or rhonchi  GI: Abdomen soft, NTND, +BS  Extremities: No LE edema, cool, No cyanosis, No clubbing  Skin: No ecchymosis, erythema, or ulcers  Psych: AOx3, appropriate affect  Neuro: CNII-XII intact, no focal deficits      Labs:       Recent Labs  Lab 07/18/17  0725   *   K 4.4   CL 97   CO2 22*   BUN 19   CREATININE 1.7*   ANIONGAP 16       Recent Labs  Lab 07/18/17  0725   AST 20   ALT 13   ALKPHOS 66   BILITOT 2.5*   ALBUMIN 3.7       Recent Labs  Lab 07/18/17  0725 07/18/17  1138 07/18/17  1250   TROPONINI  --   --  0.092*   BNP 3,299* 2,787*  --      No results for input(s): PH, PCO2, PO2, HCO3, POCSATURATED in the last 168 hours.   Recent Labs  Lab 07/18/17  1138 07/18/17  1149   WBC 5.53  --    HGB 14.3  --    HCT 41.7 48     --    GRAN 59.2  3.3  --        Recent Labs  Lab 07/18/17  1138   INR 1.2     Lab Results   Component Value Date    CHOL 122 07/06/2017    HDL 48 07/06/2017    LDLCALC 63.4 07/06/2017    TRIG 53 07/06/2017     Lab Results   Component Value Date    HGBA1C 5.4 07/06/2017    TSH 2.481 07/07/2017        Micro:  Blood Cultures  No results found for: LABBLOO  Urine Cultures  No results found for: LABURIN    Imaging:   CXR: Syncope and collapse".  Pacemaker/AICD remains. The cardiac silhouette is  enlarged.  Pulmonary vascularity is mildly increased.  The lungs are free of lobar consolidation and " alveolar edema and similar to 7/6/17.  There is no large pleural effusion or pneumothorax.  Visualized osseous structures are stable.    TTE (7/3/17):  1 - Severely depressed left ventricular systolic function (EF 10-15%).     2 - Biatrial enlargement.     3 - Restrictive LV filling pattern, indicating markedly elevated LAP (grade 3 diastolic dysfunction).     4 - Right ventricular enlargement with moderately to severely depressed systolic function.     5 - Pulmonary hypertension. The estimated PA systolic pressure is 62 mmHg.     6 - Mild mitral regurgitation.     7 - Mild tricuspid regurgitation.     8 - Increased central venous pressure.     RHC (7/7/17):  Significantly reduced CO/CI on dobutamine 5 mcg/kg/min.    Normal right and moderately elevated left sided filling pressure.    Moderate pulmonary hypertension (64/17, 34).    TPG 14  PVR  3.84.    With nitroglycerin 0.8mg SL, PA pressures dropped to 54 systolic, with PCWP dropping to 13. Repeat SVO2 from distal PA 63%, bringing CI 1.9.    EF   Date Value Ref Range Status   07/03/2017 10 (A) 55 - 65        Prior Ischemic Evaluation: NOT available in our records. Need to request records from OSH.    EKG: A paced, V paced at 60      Assessment:   67 y.o. gentleman with PMH of NICM (EF 10%) on home  at 5 mcg/kg/min, esophagitis, HTN, HLD, s/p Medtronic CRT-D who presented to the hospital after undergoing a 6MWT and developed syncope followed by ICD shocks x 2. He reports not feeling any of them.. He has already been worked up for LVAD/OHTx and was in the process of getting a VAD however, he ended up recently being diagnosed with Hep B. He was evaluated by Hepatology who wants to repeat viral studies/US and also wants him to undergo liver biopsy.       Plan:   VT s/p ICD shock x5  - Admit to HTS  - s/p amiodarone 150 mg IVP and now on drip at 1 mg/min. Once 24 hour protocol complete, will transition to amiodarone 400 mg BID x2 weeks then QD  - ICD interrogated;  report with EP team. Will follow up on the patient  - EP consult appreciated    Acute decompensated heart failure / Cardiogenic shock / INDIRA  - c/w  at 5 mcg/kg/min  - lasix 60 mg IVP given by ED, will start drip at 10 mg/hr  - Monitor lytes; keep K>4.0, Mg>2.0  - RHC (7/7) - depressed CI on  at 5  - cool and wet on exam  - hold Aldactone/entresto  - hold K supplements for now  - DW/strict I/Os/ Singer/ cardiac diet/ 1500mL fluid restriction  - Interventional cardiology consult placed - IABP placement, LIJ central line placement  - LA 3.5, repeat 2.83 (istat)    Hyperkalemia / INDIRA  - s/p D50/insulin, sodium bicarb, albuterol, calcium gluconate  - recheck BMP at 6pm    Hepatitis B infection  - r/o liver pathology - IR consulted for transjugular liver biopsy (AVOID percutaneous)  - f/u labs  - log HBV 1.20, HbsAg negative, HBV DNA 16, HBcAb +, HBsAb +  - Hep C Ab negative    HLD  - c/w pravastatin    Atrial tachyarrhythmia possible slow Atrial flutter (new onset) - [id number] -3  - c/w amiodarone gtt  - heparin gtt (w/o bolus)  - keep XA b/w 0.3-0.6    Case discussed with Dr. Torres.      Signed:  Lorena Payton MD  Cardiology Fellow  Pager: [phone]  7/18/2017 1:49 PM

## 2017-07-18 NOTE — ED NOTES
Pt transported to cath lab holding on cardiac monitor. Wife remains at bedside with all personal belongings.

## 2017-07-18 NOTE — PROCEDURES
Suman Hayden is a 67 y.o.  male patient, who presents for a 6 minute walk test ordered by MD Deric.  The diagnosis is Congestive Heart Failure.  The patient's BMI is 26.8 kg/m2.  Predicted distance (lower limit of normal) is 371.67 meters.      Test Results:    The test was not completed.  The patient stopped 1 (see progress note for more details) times for a total of 237 seconds.  The total time walked was 123 seconds.  During walking, the patient reported:  Lightheadedness and had two syncopal episode with AICD shock.  Patient was transferred to ER. The patient used no assistive devices  during testing.     07/18/2017---------Distance: 121.92 meters (400 feet)     O2 Sat % Supplemental Oxygen Heart Rate Blood Pressure Sayda Scale   Pre-exercise  (Resting) 100 % Room Air 60 bpm 119/76 mmHg 0   During Exercise 99 % Room Air 104 bpm 136/67 mmHg     Post-exercise  (Recovery)          mmHg       Recovery Time:      Performing nurse/tech:        PREVIOUS STUDY:   The patient had a previous study.  06/27/2017---------Distance: 243.84 meters (800 feet)       O2 Sat % Supplemental Oxygen Heart Rate Blood Pressure Sayda Scale   Pre-exercise  (Resting) 100 % Room Air 60 bpm 104/71 mmHg 5-6   During Exercise 91 % Room Air 69 bpm 109/78 mmHg 4   Post-exercise  (Recovery) 99 % Room Air  63 bpm   mmHg           CLINICAL INTERPRETATION:  Six minute walk distance is 121.92 meters (400 feet) with no dyspnea.  During exercise, there was no significant desaturation while breathing room air.  Blood pressure remained stable and Heart rate increased significantly with walking.  This may represent a tachycardic response to exercise.  The patient did not report non-pulmonary symptoms during exercise.  Severe exercise impairment is likely due to cardiovascular causes.  The patient did not complete the study, walking 123 seconds of the 360 second test.  Since the previous study in June 2017, exercise capacity is significantly  worse.  Based upon age and body mass index, exercise capacity is less than predicted.

## 2017-07-18 NOTE — PROGRESS NOTES
"Transported pt to ER via wheelchair along with Dr Gama after pt had near-syncopal episode during 6 minute walk test.  Wife and pulm tech report pt sat down, got very diaphoretic. Wife reports "he jerked a little bit then I saw his chest jerk really high like he was shocked".     Stayed with pt until he was brought to a room with ER staff. Dr Gama and Dr Stein at bedside.   "

## 2017-07-18 NOTE — CONSULTS
[facility]  Cardiac Electrophysiology  Consult Note    Admission Date: 7/18/2017  Code Status: Full Code   Attending Provider: Dc Cruz MD  Consulting Provider: Hayde Fairchild MD  Principal Problem:AICD discharge    Inpatient consult to Electrophysiology  Consult performed by: HAYDE FAIRCHILD  Consult ordered by: ALEXANDRA DUEÑAS        Subjective:     Chief Complaint:  AICD discharge     HPI:   Mr. Hayden is a 67 year old man with advanced NICM (LVEF 10%) on home , HTN, HLD with Medtronic CRT-D who presented this morning with a syncopal episode during a 6MWT followed by an ICD shock x 2. On interrogation, he had been shocked 5 times since 7/14, and 4 times in last two days. He is hemodynamically stable, awaiting IABP insertion and admission to the unit for cardiogenic shock. Other than fatigue, no symptoms at present.    Past Medical History:   Diagnosis Date    Cardiomyopathy     Hyperlipidemia     Hypertension     Obesity     S/P implantation of automatic cardioverter/defibrillator (AICD)     Ventricular tachycardia        Past Surgical History:   Procedure Laterality Date    CARDIAC CATHETERIZATION      CARDIAC DEFIBRILLATOR PLACEMENT         Review of patient's allergies indicates:  No Known Allergies    No current facility-administered medications on file prior to encounter.      Current Outpatient Prescriptions on File Prior to Encounter   Medication Sig    amiodarone (PACERONE) 200 MG Tab Take by mouth once daily.    aspirin 81 MG Chew Take 81 mg by mouth once daily.    DOBUTamine (DOBUTREX) 1,000 mg/250 mL (4,000 mcg/mL) infusion Inject 414 mcg/min into the vein continuous.    furosemide (LASIX) 40 MG tablet Take 1 tablet (40 mg total) by mouth 2 (two) times daily.    potassium chloride SA (K-DUR,KLOR-CON) 20 MEQ tablet Take 20 mEq by mouth once daily.     pravastatin (PRAVACHOL) 20 MG tablet Take 20 mg by mouth every evening.    spironolactone  (ALDACTONE) 25 MG tablet Take 1 tablet (25 mg total) by mouth once daily.    [DISCONTINUED] duloxetine (CYMBALTA) 60 MG capsule Take 60 mg by mouth once daily.     Family History     None        Social History Main Topics    Smoking status: Never Smoker    Smokeless tobacco: Never Used    Alcohol use No    Drug use: Unknown    Sexual activity: Not on file     ROS   Gen: denies fever chills + fatigue  HEENT: denies HA, blurry vision  Resp: denies SOB, cough  CV: Denies CP, palps, +orthopnea, +PND, +edema  GI: denies abd pain, nausea/vomiting, diarrhea    Objective:     Vital Signs (Most Recent):  Temp: 97.5 °F (36.4 °C) (07/18/17 1117)  Pulse: 60 (07/18/17 1432)  Resp: 16 (07/18/17 1432)  BP: 116/69 (07/18/17 1432)  SpO2: 96 % (07/18/17 1432) Vital Signs (24h Range):  Temp:  [97.2 °F (36.2 °C)-97.5 °F (36.4 °C)] 97.5 °F (36.4 °C)  Pulse:  [] 60  Resp:  [16-40] 16  SpO2:  [96 %-100 %] 96 %  BP: (106-133)/(64-81) 116/69     Weight: 79.8 kg (176 lb)  Body mass index is 26.76 kg/m².    SpO2: 96 %  O2 Device (Oxygen Therapy): room air    Physical Exam   Gen: no acute distress, alert & oriented x 3  Neck: +14cm JVD, no carotid bruits  CV: regular rate and rhythm, no murmurs, rubs, gallops  Resp: clear to auscultation bilaterally, normal effort  GI: soft, nontender nondistended, normal bowel sounds  Ext: cool extremities, +1 edema, no clubbing or cyanosis      Significant Labs:   CMP:   Recent Labs  Lab 07/18/17  0725 07/18/17  1350   * 130*   K 4.4 3.9   CL 97 94*   CO2 22* 24   * 265*   BUN 19 20   CREATININE 1.7* 1.8*   CALCIUM 10.5 13.7*   PROT 8.3 7.3   ALBUMIN 3.7 3.3*   BILITOT 2.5* 2.2*   ALKPHOS 66 54*   AST 20 17   ALT 13 13   ANIONGAP 16 12   ESTGFRAFRICA 47.2* 44.0*   EGFRNONAA 40.8* 38.1*   , CBC:   Recent Labs  Lab 07/18/17  1138 07/18/17  1149   WBC 5.53  --    HGB 14.3  --    HCT 41.7 48     --    , INR:   Recent Labs  Lab 07/18/17  1138   INR 1.2   , Troponin   Recent  Labs  Lab 07/18/17  1250   TROPONINI 0.092*   , All pertinent lab results from the last 24 hours have been reviewed. and lactate 3.5    Significant Imaging: Echocardiogram:   2D echo with color flow doppler:   Results for orders placed or performed during the hospital encounter of 07/03/17   2D echo with color flow doppler   Result Value Ref Range    EF 10 (A) 55 - 65    Mitral Valve Regurgitation MILD     Diastolic Dysfunction Yes (A)     Est. PA Systolic Pressure 62.06 (A)     Tricuspid Valve Regurgitation MILD     and   Interrogation: Multiple runs of atrial tachycardia (appear organized resembling atrial flutter) with ventricular tachycardia requiring several appropriate shocks for VT    Assessment and Plan:     * AICD discharge    Mr. Hayden is a 67 year old man with advanced NICM (LVEF 10%) awaiting LVAD, HTN HLD who presented with syncope and ICD discharge for VT. Underlying his VT, he also has an organized atrial tachycardia which appears to be new from his EGM since May 2017. He is acutely ill and would benefit from aggressive AAD therapy and treatment of his decompensated heart failure. Furthermore, with newly discovered atrial tachyarrhythmia, he would benefit from full anticoagulation in conjunction with AAD therapy. He has started amiodarone IV load this afternoon.    --continue full IV amiodarone load; plan on transition to PO amiodarone 400mg BID tomorrow x 2 weeks then likely 400mg daily thereafter  --initiate heparin gtt to prevent RUTH thrombus and CVA given atrial tachyarrhythmia and high CVA risk  --will discuss more definitive arrhythmia strategies once he is stabilized.            Thank you for your consult. I will follow-up with patient. Please contact us if you have any additional questions.    Shayne Fairchild MD  Cardiac Electrophysiology  [facility]

## 2017-07-18 NOTE — HPI
Mr. Hayden is a 67 year old man with advanced NICM (LVEF 10%) on home , HTN, HLD with Medtronic CRT-D who presented 7/18 morning with a syncopal episode during a 6MWT followed by an ICD shock x 2. On interrogation, he had been shocked 5 times since 7/14, and 4 times in last two days prior to admit. He is now s/p HMIII LVAD and RV lead on ICD is not pacing.  ICD is currently off.

## 2017-07-18 NOTE — CONSULTS
INTERVENTIONAL CARDIOLOGY CONSULT      Referring Physician:  Dc Cruz MD  Indication: IABP and Central line     HPI:  66 yo M with NICM s/p ICD, HLD, and HTN who is scheduled for LVAD placement later this month as DT. He was undergoing 6 min walk prior to clinic appointment today and had an episode of syncope associated with ICD discharge. He was evaluated by HTS and found to be decompensated heart failure. Interventional cardiology was consulted for IABP and Central line placement. Patient is pending VAD so will plan for left sided IABP.    ROS:  Constitution: Negative for fever, chills, weight loss or gain.   HENT: Negative for sore throat, rhinorrhea, or headache.  Eyes: Negative for blurred or double vision.   Cardiovascular: See above  Pulmonary: Positive for SOB   Gastrointestinal: Negative for abdominal pain, nausea, vomiting, or diarrhea.   : Negative for dysuria.   Neurological: Negative for focal weakness or sensory changes.    Past Medical History:   Diagnosis Date    Cardiomyopathy     Hyperlipidemia     Hypertension     Obesity     S/P implantation of automatic cardioverter/defibrillator (AICD)     Ventricular tachycardia      Past Surgical History:   Procedure Laterality Date    CARDIAC CATHETERIZATION      CARDIAC DEFIBRILLATOR PLACEMENT       History reviewed. No pertinent family history.  Social History   Substance Use Topics    Smoking status: Never Smoker    Smokeless tobacco: Never Used    Alcohol use No     Review of patient's allergies indicates:  No Known Allergies   amiodarone  150 mg Intravenous ED 1 Time    calcium gluconate 1g in dextrose 5% 100mL (ready to mix system)  1 g Intravenous ED 1 Time    heparin (porcine)  5,000 Units Subcutaneous Q8H    pravastatin  20 mg Oral QHS    sodium chloride 0.9%  3 mL Intravenous Q8H     No current facility-administered medications on file prior to encounter.      Current Outpatient Prescriptions on File Prior to Encounter    Medication Sig Dispense Refill    amiodarone (PACERONE) 200 MG Tab Take by mouth once daily.      aspirin 81 MG Chew Take 81 mg by mouth once daily.      DOBUTamine (DOBUTREX) 1,000 mg/250 mL (4,000 mcg/mL) infusion Inject 414 mcg/min into the vein continuous. 500 mL 11    furosemide (LASIX) 40 MG tablet Take 1 tablet (40 mg total) by mouth 2 (two) times daily. 180 tablet 3    potassium chloride SA (K-DUR,KLOR-CON) 20 MEQ tablet Take 20 mEq by mouth once daily.       pravastatin (PRAVACHOL) 20 MG tablet Take 20 mg by mouth every evening.      spironolactone (ALDACTONE) 25 MG tablet Take 1 tablet (25 mg total) by mouth once daily. 90 tablet 3    [DISCONTINUED] duloxetine (CYMBALTA) 60 MG capsule Take 60 mg by mouth once daily.         Continuous Infusions:   DOBUTamine      furosemide (LASIX) 5 mg/mL infusion (non-titrating)         Scheduled Meds:   amiodarone  150 mg Intravenous ED 1 Time    calcium gluconate 1g in dextrose 5% 100mL (ready to mix system)  1 g Intravenous ED 1 Time    heparin (porcine)  5,000 Units Subcutaneous Q8H    pravastatin  20 mg Oral QHS    sodium chloride 0.9%  3 mL Intravenous Q8H     PRN Meds:  OBJECTIVE:     Vitals:    07/18/17 1147 07/18/17 1202 07/18/17 1220 07/18/17 1232   BP: 112/67 115/70  118/64   Pulse: 60 60 60 60   Resp:   20    Temp:       TempSrc:       SpO2: 97% 96% 97% 100%   Weight:       Height:         Gen: Lying in bed, no acute distress  HEENT: Supple, some JVD, able to lay flat  Cvs: regular rate  Resp: Normal work of breathing, clear bilaterally  Abd: Soft, non-tender and not distended  Ext: Cool, no edema  Vascular:   LEFT RIGHT   RADIAL 2+ 2+   BRACHIAL 2+ 2+   FEMORAL 2+ 2+   DP 2+ 2+   TP 2+ 2+   North's Test Normal Normal     Labs:       Recent Labs  Lab 07/18/17  0725   *   K 4.4   CL 97   CO2 22*   BUN 19   CREATININE 1.7*   *   ANIONGAP 16     No results for input(s): MG, PHOS in the last 168 hours.    Invalid input(s):  CA    Recent Labs  Lab 07/18/17  0725   AST 20   ALT 13   ALKPHOS 66   BILITOT 2.5*   ALBUMIN 3.7        Recent Labs  Lab 07/18/17  1138 07/18/17  1149   WBC 5.53  --    HGB 14.3  --    HCT 41.7 48     --    GRAN 59.2  3.3  --        Recent Labs  Lab 07/18/17  1138   INR 1.2       Recent Labs  Lab 07/18/17  0725 07/18/17  1138 07/18/17  1250   TROPONINI  --   --  0.092*   BNP 3,299* 2,787*  --       Micro:   Blood Cultures  No results found for: LABBLOO  Urine Cultures  No results found for: LABURIN  IMAGING:   EKGs: AV paced    TTE:  EF   Date Value Ref Range Status   07/03/2017 10 (A) 55 - 65      TTE 7/3/17  CONCLUSIONS     1 - Severely depressed left ventricular systolic function (EF 10-15%).     2 - Biatrial enlargement.     3 - Restrictive LV filling pattern, indicating markedly elevated LAP (grade 3 diastolic dysfunction).     4 - Right ventricular enlargement with moderately to severely depressed systolic function.     5 - Pulmonary hypertension. The estimated PA systolic pressure is 62 mmHg.     6 - Mild mitral regurgitation.     7 - Mild tricuspid regurgitation.     8 - Increased central venous pressure.     IMPRESSION:   L IABP with left IJ placement.    PLAN:   - Left Femoral IABP  - Left IJ TLC    -The risks, benefits & alternatives of the procedure were explained to the patient.    -The risks of the procedure include but are not limited to:  Bleeding, infection, heart rhythm abnormalities, allergic reactions, kidney injury, stroke and death.    -The risks of moderate sedation include hypotension, respiratory depression, arrhythmias, bronchospasm, & death.    -Informed consent was obtained & the patient is agreeable to proceed with the procedure.  -This patient was discussed with the attending interventional cardiologist who agrees with the above assessment & plan.      Maurice Ramos DO  Cardiology Fellow  Pager [phone]

## 2017-07-18 NOTE — PROGRESS NOTES
Pt and wife AAAO. Continued with VAD education.  Both verbalize they have no questions at this time.  Mrs. Hayden verbalized she understands the VAD is tabled until the liver is cleared, but they have no questions right now.

## 2017-07-18 NOTE — PROGRESS NOTES
HEPATOLOGY CONSULTATION    Referring Physician: Dr. CONNOR Leos  Current Corresponding Physician: Dr. CONNOR Leos    Reason for Consultation: Consultation for evaluation of Hepatitis B infection without delta agent without hepatic co    History of Present Illness: Suman Hayden is a 67 y.o. malewho presents for evaluation of   Chief Complaint   Patient presents with    Hepatitis B infection without delta agent without hepatic co   67 y.o AA male with cardiomyopathy (AICD) being considered for VAD and possible Elke. Discovered in 2012 to have natural immunity to HBV. sAg -, cAb+, sAb+. Recent HBV DNA positive at 16 IU/mL. No symptoms of liver disease. U/S normal liver, spleen, some snehal-splenic fluid. AST, ALT normal. However bilirubin persistently elevated.    Past Medical History:   Diagnosis Date    Cardiomyopathy     Hyperlipidemia     Hypertension     Obesity     S/P implantation of automatic cardioverter/defibrillator (AICD)     Ventricular tachycardia      Outpatient Encounter Prescriptions as of 7/18/2017   Medication Sig Dispense Refill    amiodarone (PACERONE) 200 MG Tab Take by mouth once daily.      aspirin 81 MG Chew Take 81 mg by mouth once daily.      DOBUTamine (DOBUTREX) 1,000 mg/250 mL (4,000 mcg/mL) infusion Inject 414 mcg/min into the vein continuous. 500 mL 11    furosemide (LASIX) 40 MG tablet Take 1 tablet (40 mg total) by mouth 2 (two) times daily. 180 tablet 3    potassium chloride SA (K-DUR,KLOR-CON) 20 MEQ tablet Take 20 mEq by mouth once daily.       pravastatin (PRAVACHOL) 20 MG tablet Take 20 mg by mouth every evening.      spironolactone (ALDACTONE) 25 MG tablet Take 1 tablet (25 mg total) by mouth once daily. 90 tablet 3    [DISCONTINUED] duloxetine (CYMBALTA) 60 MG capsule Take 60 mg by mouth once daily.       No facility-administered encounter medications on file as of 7/18/2017.      Review of patient's allergies indicates:  No Known Allergies  History reviewed. No pertinent  family history.    Social History     Social History    Marital status:      Spouse name: N/A    Number of children: N/A    Years of education: N/A     Occupational History    Not on file.     Social History Main Topics    Smoking status: Never Smoker    Smokeless tobacco: Never Used    Alcohol use No    Drug use: Unknown    Sexual activity: Not on file     Other Topics Concern    Not on file     Social History Narrative    No narrative on file     Review of Systems   Constitutional: Positive for fatigue. Negative for activity change, appetite change, chills and unexpected weight change.   HENT: Negative for congestion, facial swelling and tinnitus.    Eyes: Negative for visual disturbance.   Respiratory: Negative for cough, shortness of breath and wheezing.    Cardiovascular: Negative for chest pain and palpitations.   Gastrointestinal: Negative for abdominal distention.   Genitourinary: Negative for dysuria.   Musculoskeletal: Negative for arthralgias, joint swelling and myalgias.   Neurological: Negative for syncope and headaches.   Hematological: Does not bruise/bleed easily.   Psychiatric/Behavioral: Negative for confusion.     Vitals:    07/18/17 0957   BP: 117/73   Pulse: 82   Resp: 18   Temp: 97.2 °F (36.2 °C)       Physical Exam   Constitutional: He is oriented to person, place, and time. He appears well-developed and well-nourished.   Eyes: No scleral icterus.   Cardiovascular: Normal rate, regular rhythm and normal heart sounds.    Pulmonary/Chest: Effort normal and breath sounds normal. No respiratory distress. He has no wheezes.       Abdominal: Soft. Bowel sounds are normal. He exhibits no distension and no mass. There is no tenderness. There is no rebound.   Musculoskeletal: Normal range of motion.   Lymphadenopathy:     He has no cervical adenopathy.   Neurological: He is alert and oriented to person, place, and time.   Skin: Skin is warm and dry.       MELD-Na score: 13 at 7/8/2017   5:16 AM  MELD score: 12 at 7/8/2017  5:16 AM  Calculated from:  Serum Creatinine: 1.1 mg/dL at 7/8/2017  5:16 AM  Serum Sodium: 136 mmol/L at 7/8/2017  5:16 AM  Total Bilirubin: 2.8 mg/dL at 7/8/2017  5:16 AM  INR(ratio): 1.1 at 7/7/2017  4:58 AM  Age: 67 years    Lab Results   Component Value Date     (H) 07/18/2017    BUN 19 07/18/2017    CREATININE 1.7 (H) 07/18/2017    CALCIUM 10.5 07/18/2017     (L) 07/18/2017    K 4.4 07/18/2017    CL 97 07/18/2017    PROT 8.3 07/18/2017    CO2 22 (L) 07/18/2017    ANIONGAP 16 07/18/2017    WBC 5.24 07/09/2017    RBC 4.53 (L) 07/09/2017    HGB 13.0 (L) 07/09/2017    HCT 38.5 (L) 07/09/2017    MCV 85 07/09/2017    MCH 28.7 07/09/2017    MCHC 33.8 07/09/2017     Lab Results   Component Value Date    RDW 16.9 (H) 07/09/2017     (L) 07/09/2017    MPV 11.3 07/09/2017    GRAN 2.4 07/09/2017    GRAN 46.5 07/09/2017    LYMPH 1.6 07/09/2017    LYMPH 30.9 07/09/2017    MONO 0.8 07/09/2017    MONO 15.5 (H) 07/09/2017    EOSINOPHIL 6.7 07/09/2017    BASOPHIL 0.4 07/09/2017    EOS 0.4 07/09/2017    BASO 0.02 07/09/2017    APTT 28.3 07/07/2017    GROUPTRH O POS 07/03/2017    BNP 3,299 (H) 07/18/2017    CHOL 122 07/06/2017    TRIG 53 07/06/2017    HDL 48 07/06/2017    CHOLHDL 39.3 07/06/2017    TOTALCHOLEST 2.5 07/06/2017    ALBUMIN 3.7 07/18/2017    BILIDIR 1.4 (H) 07/09/2017    AST 20 07/18/2017    ALT 13 07/18/2017    ALKPHOS 66 07/18/2017    MG 2.0 07/03/2017    LABPROT 11.9 07/07/2017    INR 1.1 07/07/2017    PSA 6.9 (H) 07/06/2017       Assessment and Plan:  Patient Active Problem List   Diagnosis    Nonischemic cardiomyopathy    CHF (NYHA class IV, ACC/AHA stage D)    Encounter for monitoring amiodarone therapy    Acute on chronic combined systolic and diastolic heart failure    HTN (hypertension)    Hyperlipidemia    V-tach    Elevated PSA    Hepatitis B core antibody positive since 2012    Acute on chronic systolic heart failure    Acute on chronic  heart failure    Heart transplant candidate    Hyperbilirubinemia     Suman Hayden is a 67 y.o. male withHepatitis B infection without delta agent without hepatic co  Suspect no significant liver disease. Suspect HBV DNA false positive.  Given elevated bilirubin and perisplenic fluid will proceed with liver biopsy. Will repeat viral studies and HBV DNA.    Outside Records Request:

## 2017-07-18 NOTE — PROGRESS NOTES
After walking for 123 seconds, pt became light headed, and had to sit down.  He then blackedout and started shaking (seizure like) and blowing bubbles out his mouth.  Pt not responding tried sternal rub.  Called for help, Dr Paula came to patient side and by that time he was responding again.  Shortly after Dr. Paula arrived he blacked out again with the same symtoms as before.  Code called, paitents internal defibrillator shocked him.  Patient responding again and Dr. Leos now at patient side.  Patient put into a wheelchair and sent to the ER.

## 2017-07-19 ENCOUNTER — ANESTHESIA EVENT (OUTPATIENT)
Dept: SURGERY | Facility: HOSPITAL | Age: 67
DRG: 001 | End: 2017-07-19
Payer: MEDICARE

## 2017-07-19 PROBLEM — N17.9 AKI (ACUTE KIDNEY INJURY): Status: ACTIVE | Noted: 2017-07-19

## 2017-07-19 LAB
ALBUMIN SERPL BCP-MCNC: 3.2 G/DL
ALBUMIN SERPL BCP-MCNC: 3.3 G/DL
ALLENS TEST: ABNORMAL
ALP SERPL-CCNC: 53 U/L
ALP SERPL-CCNC: 55 U/L
ALT SERPL W/O P-5'-P-CCNC: 12 U/L
ALT SERPL W/O P-5'-P-CCNC: 13 U/L
ANION GAP SERPL CALC-SCNC: 10 MMOL/L
ANION GAP SERPL CALC-SCNC: 13 MMOL/L
AST SERPL-CCNC: 16 U/L
AST SERPL-CCNC: 17 U/L
BASOPHILS # BLD AUTO: 0.01 K/UL
BASOPHILS NFR BLD: 0.2 %
BILIRUB SERPL-MCNC: 1.8 MG/DL
BILIRUB SERPL-MCNC: 2 MG/DL
BUN SERPL-MCNC: 20 MG/DL
BUN SERPL-MCNC: 20 MG/DL
CALCIUM SERPL-MCNC: 9.6 MG/DL
CALCIUM SERPL-MCNC: 9.8 MG/DL
CHLORIDE SERPL-SCNC: 96 MMOL/L
CHLORIDE SERPL-SCNC: 96 MMOL/L
CO2 SERPL-SCNC: 25 MMOL/L
CO2 SERPL-SCNC: 26 MMOL/L
CREAT SERPL-MCNC: 1.6 MG/DL
CREAT SERPL-MCNC: 1.6 MG/DL
DELSYS: ABNORMAL
DIFFERENTIAL METHOD: ABNORMAL
EOSINOPHIL # BLD AUTO: 0.1 K/UL
EOSINOPHIL NFR BLD: 2.1 %
ERYTHROCYTE [DISTWIDTH] IN BLOOD BY AUTOMATED COUNT: 15.5 %
EST. GFR  (AFRICAN AMERICAN): 50.8 ML/MIN/1.73 M^2
EST. GFR  (AFRICAN AMERICAN): 50.8 ML/MIN/1.73 M^2
EST. GFR  (NON AFRICAN AMERICAN): 43.9 ML/MIN/1.73 M^2
EST. GFR  (NON AFRICAN AMERICAN): 43.9 ML/MIN/1.73 M^2
FACT X PPP CHRO-ACNC: 0.39 IU/ML
FIO2: 21
GLUCOSE SERPL-MCNC: 111 MG/DL
GLUCOSE SERPL-MCNC: 122 MG/DL
HCO3 UR-SCNC: 28.1 MMOL/L (ref 24–28)
HCO3 UR-SCNC: 29.3 MMOL/L (ref 24–28)
HCO3 UR-SCNC: 31.6 MMOL/L (ref 24–28)
HCT VFR BLD AUTO: 37.2 %
HGB BLD-MCNC: 12.8 G/DL
INR PPP: 1.3
LYMPHOCYTES # BLD AUTO: 1.3 K/UL
LYMPHOCYTES NFR BLD: 26.4 %
MAGNESIUM SERPL-MCNC: 2.1 MG/DL
MCH RBC QN AUTO: 29 PG
MCHC RBC AUTO-ENTMCNC: 34.4 %
MCV RBC AUTO: 84 FL
MODE: ABNORMAL
MONOCYTES # BLD AUTO: 0.6 K/UL
MONOCYTES NFR BLD: 13 %
NEUTROPHILS # BLD AUTO: 2.8 K/UL
NEUTROPHILS NFR BLD: 58.1 %
PCO2 BLDA: 43.9 MMHG (ref 35–45)
PCO2 BLDA: 44.4 MMHG (ref 35–45)
PCO2 BLDA: 44.5 MMHG (ref 35–45)
PH SMN: 7.41 [PH] (ref 7.35–7.45)
PH SMN: 7.43 [PH] (ref 7.35–7.45)
PH SMN: 7.46 [PH] (ref 7.35–7.45)
PHOSPHATE SERPL-MCNC: 4.7 MG/DL
PLATELET # BLD AUTO: 199 K/UL
PMV BLD AUTO: 10.6 FL
PO2 BLDA: 33 MMHG (ref 40–60)
PO2 BLDA: 34 MMHG (ref 40–60)
PO2 BLDA: 35 MMHG (ref 40–60)
POC BE: 3 MMOL/L
POC BE: 5 MMOL/L
POC BE: 8 MMOL/L
POC SATURATED O2: 64 % (ref 95–100)
POC SATURATED O2: 67 % (ref 95–100)
POC SATURATED O2: 68 % (ref 95–100)
POC TCO2: 29 MMOL/L (ref 24–29)
POC TCO2: 31 MMOL/L (ref 24–29)
POC TCO2: 33 MMOL/L (ref 24–29)
POTASSIUM SERPL-SCNC: 3.9 MMOL/L
POTASSIUM SERPL-SCNC: 3.9 MMOL/L
PROT SERPL-MCNC: 7 G/DL
PROT SERPL-MCNC: 7.2 G/DL
PROTHROMBIN TIME: 13.3 SEC
RBC # BLD AUTO: 4.41 M/UL
SAMPLE: ABNORMAL
SITE: ABNORMAL
SODIUM SERPL-SCNC: 132 MMOL/L
SODIUM SERPL-SCNC: 134 MMOL/L
SP02: 96
SP02: 96
SP02: 98
WBC # BLD AUTO: 4.77 K/UL

## 2017-07-19 PROCEDURE — 80053 COMPREHEN METABOLIC PANEL: CPT

## 2017-07-19 PROCEDURE — 99233 SBSQ HOSP IP/OBS HIGH 50: CPT | Mod: ,,, | Performed by: INTERNAL MEDICINE

## 2017-07-19 PROCEDURE — 99223 1ST HOSP IP/OBS HIGH 75: CPT | Mod: ,,, | Performed by: PHYSICIAN ASSISTANT

## 2017-07-19 PROCEDURE — 25000003 PHARM REV CODE 250: Performed by: INTERNAL MEDICINE

## 2017-07-19 PROCEDURE — 63600175 PHARM REV CODE 636 W HCPCS: Performed by: PHYSICIAN ASSISTANT

## 2017-07-19 PROCEDURE — 25000003 PHARM REV CODE 250: Performed by: STUDENT IN AN ORGANIZED HEALTH CARE EDUCATION/TRAINING PROGRAM

## 2017-07-19 PROCEDURE — 20000000 HC ICU ROOM

## 2017-07-19 PROCEDURE — 83735 ASSAY OF MAGNESIUM: CPT

## 2017-07-19 PROCEDURE — 63600175 PHARM REV CODE 636 W HCPCS: Performed by: STUDENT IN AN ORGANIZED HEALTH CARE EDUCATION/TRAINING PROGRAM

## 2017-07-19 PROCEDURE — 99499 UNLISTED E&M SERVICE: CPT | Mod: ,,, | Performed by: PHYSICIAN ASSISTANT

## 2017-07-19 PROCEDURE — 90715 TDAP VACCINE 7 YRS/> IM: CPT | Performed by: PHYSICIAN ASSISTANT

## 2017-07-19 PROCEDURE — 94761 N-INVAS EAR/PLS OXIMETRY MLT: CPT

## 2017-07-19 PROCEDURE — 85025 COMPLETE CBC W/AUTO DIFF WBC: CPT

## 2017-07-19 PROCEDURE — 84100 ASSAY OF PHOSPHORUS: CPT

## 2017-07-19 PROCEDURE — 63600175 PHARM REV CODE 636 W HCPCS: Performed by: INTERNAL MEDICINE

## 2017-07-19 PROCEDURE — 3E0234Z INTRODUCTION OF SERUM, TOXOID AND VACCINE INTO MUSCLE, PERCUTANEOUS APPROACH: ICD-10-PCS | Performed by: INTERNAL MEDICINE

## 2017-07-19 PROCEDURE — 85520 HEPARIN ASSAY: CPT

## 2017-07-19 PROCEDURE — 82803 BLOOD GASES ANY COMBINATION: CPT

## 2017-07-19 PROCEDURE — 99223 1ST HOSP IP/OBS HIGH 75: CPT | Mod: GC,,, | Performed by: INTERNAL MEDICINE

## 2017-07-19 PROCEDURE — 80053 COMPREHEN METABOLIC PANEL: CPT | Mod: 91

## 2017-07-19 PROCEDURE — 85610 PROTHROMBIN TIME: CPT

## 2017-07-19 PROCEDURE — 99900035 HC TECH TIME PER 15 MIN (STAT)

## 2017-07-19 PROCEDURE — 90471 IMMUNIZATION ADMIN: CPT | Performed by: PHYSICIAN ASSISTANT

## 2017-07-19 PROCEDURE — 27000202 HC IABP, ADD'L DAY

## 2017-07-19 RX ORDER — POTASSIUM CHLORIDE 14.9 MG/ML
20 INJECTION INTRAVENOUS ONCE
Status: COMPLETED | OUTPATIENT
Start: 2017-07-19 | End: 2017-07-19

## 2017-07-19 RX ORDER — BISACODYL 10 MG
10 SUPPOSITORY, RECTAL RECTAL DAILY PRN
Status: DISCONTINUED | OUTPATIENT
Start: 2017-07-19 | End: 2017-07-27

## 2017-07-19 RX ORDER — LORAZEPAM 2 MG/ML
INJECTION INTRAMUSCULAR
Status: DISCONTINUED
Start: 2017-07-19 | End: 2017-07-19 | Stop reason: WASHOUT

## 2017-07-19 RX ORDER — FAMOTIDINE 20 MG/1
20 TABLET, FILM COATED ORAL DAILY
Status: DISCONTINUED | OUTPATIENT
Start: 2017-07-19 | End: 2017-07-27

## 2017-07-19 RX ORDER — AMIODARONE HYDROCHLORIDE 200 MG/1
400 TABLET ORAL 2 TIMES DAILY
Status: DISCONTINUED | OUTPATIENT
Start: 2017-07-19 | End: 2017-07-27

## 2017-07-19 RX ADMIN — AMIODARONE HYDROCHLORIDE 0.5 MG/MIN: 1.8 INJECTION, SOLUTION INTRAVENOUS at 01:07

## 2017-07-19 RX ADMIN — CLOSTRIDIUM TETANI TOXOID ANTIGEN (FORMALDEHYDE INACTIVATED), CORYNEBACTERIUM DIPHTHERIAE TOXOID ANTIGEN (FORMALDEHYDE INACTIVATED), BORDETELLA PERTUSSIS TOXOID ANTIGEN (GLUTARALDEHYDE INACTIVATED), BORDETELLA PERTUSSIS FILAMENTOUS HEMAGGLUTININ ANTIGEN (FORMALDEHYDE INACTIVATED), BORDETELLA PERTUSSIS PERTACTIN ANTIGEN, AND BORDETELLA PERTUSSIS FIMBRIAE 2/3 ANTIGEN 0.5 ML: 5; 2; 2.5; 5; 3; 5 INJECTION, SUSPENSION INTRAMUSCULAR at 03:07

## 2017-07-19 RX ADMIN — POLYETHYLENE GLYCOL 3350 17 G: 17 POWDER, FOR SOLUTION ORAL at 08:07

## 2017-07-19 RX ADMIN — SODIUM CHLORIDE, PRESERVATIVE FREE 3 ML: 5 INJECTION INTRAVENOUS at 06:07

## 2017-07-19 RX ADMIN — FAMOTIDINE 20 MG: 20 TABLET, FILM COATED ORAL at 10:07

## 2017-07-19 RX ADMIN — POTASSIUM CHLORIDE 20 MEQ: 200 INJECTION, SOLUTION INTRAVENOUS at 08:07

## 2017-07-19 RX ADMIN — SENNOSIDES 8.6 MG: 8.6 TABLET, FILM COATED ORAL at 08:07

## 2017-07-19 RX ADMIN — PRAVASTATIN SODIUM 20 MG: 20 TABLET ORAL at 08:07

## 2017-07-19 RX ADMIN — AMIODARONE HYDROCHLORIDE 0.5 MG/MIN: 1.8 INJECTION, SOLUTION INTRAVENOUS at 08:07

## 2017-07-19 RX ADMIN — SODIUM CHLORIDE, PRESERVATIVE FREE 3 ML: 5 INJECTION INTRAVENOUS at 10:07

## 2017-07-19 RX ADMIN — FUROSEMIDE 20 MG/HR: 10 INJECTION, SOLUTION INTRAVENOUS at 04:07

## 2017-07-19 RX ADMIN — DOBUTAMINE HYDROCHLORIDE 5 MCG/KG/MIN: 400 INJECTION INTRAVENOUS at 10:07

## 2017-07-19 RX ADMIN — AMIODARONE HYDROCHLORIDE 400 MG: 200 TABLET ORAL at 08:07

## 2017-07-19 RX ADMIN — HEPARIN SODIUM AND DEXTROSE 17 UNITS/KG/HR: 10000; 5 INJECTION INTRAVENOUS at 08:07

## 2017-07-19 RX ADMIN — BISACODYL 10 MG: 10 SUPPOSITORY RECTAL at 10:07

## 2017-07-19 RX ADMIN — SODIUM CHLORIDE, PRESERVATIVE FREE 3 ML: 5 INJECTION INTRAVENOUS at 04:07

## 2017-07-19 NOTE — PLAN OF CARE
Problem: Patient Care Overview  Goal: Plan of Care Review  Outcome: Ongoing (interventions implemented as appropriate)  VSS within the shift. No pain concerns reported. Still on paced rhythm. IABP settings maintained with the following settings:  1:1, EKG trigger, Auto. Established CVP line, decreasing mean trend noted. Room air tolerated with O2 saturations >94%. Amiodarone and dobutamine drip maintained at received rate. Heparin drip continued but needs to be put on hold 4 hours prior the transjugular liver biopsy. Maintained on NPO for the procedure. FC in place, urine output noted >80/hr considering that patient is on Lasix infusion. POC discussed with patient and spouse. Will continue to monitor.

## 2017-07-19 NOTE — HPI
This is a 67 year old male with NICM on home dobutamine, HTN, HLD, s/p medtronic who presented after ICD shocks x 2. He is now being worked up for advanced options. He currently has IABP. He is being evaluated for LVAD.  We saw patient last weekend for LVAD clearance and he was cleared at that time with vaccines updated.  We are now re-consulted for positive urine culture with enterococcus. His UA has greater than 100 WBCs. Patient denies dysuria, urinary frequency, abd pain, or flank pain. He does not have a delgado catheter in place. He is afebrile and without leukocytosis. He is scheduled for LVAD placement tomorrow and we are re-consulted for clearance.  ID was initially consulted for clearance of LVAD and again for asymptomatic bacteruria w/ enterococcus prior to LVAD placement. ID is reconsulted s/p LVAD placement for antibiotic recommendations regarding positive blood cx and resp cx for ESBL E Coli.

## 2017-07-19 NOTE — PROGRESS NOTES
HTS service made aware that PICC line was noted to be partially pulled out. Verified if IJ catheter line may now be accessed for IV medications. MD gave a verbal order that IJ line may now be utilized.

## 2017-07-19 NOTE — PROGRESS NOTES
[facility]  Heart Transplant  Progress Note    Patient Name: Suman Hayden  MRN: [id number]  Admission Date: 7/18/2017  Hospital Length of Stay: 1 days  Attending Physician: Angie Torres MD  Primary Care Provider: Joe Ernst MD  Principal Problem:Acute on chronic combined systolic and diastolic heart failure    Subjective:     Interval History: NAEON. Feels significantly better today with the balloon pump.     Continuous Infusions:   amiodarone 0.5 mg/min (07/19/17 0900)    DOBUTamine 5 mcg/kg/min (07/19/17 0900)    furosemide (LASIX) 5 mg/mL infusion (non-titrating) 20 mg/hr (07/19/17 0900)    heparin (porcine) in D5W 17 Units/kg/hr (07/19/17 0821)     Scheduled Meds:   amiodarone  400 mg Oral BID    famotidine  20 mg Oral Daily    polyethylene glycol  17 g Oral Daily    pravastatin  20 mg Oral QHS    senna  8.6 mg Oral Daily    sodium chloride 0.9%  3 mL Intravenous Q8H     PRN Meds:bisacodyl, ondansetron    Review of patient's allergies indicates:  No Known Allergies  Objective:     Vital Signs (Most Recent):  Temp: 98.6 °F (37 °C) (07/19/17 0700)  Pulse: 60 (07/19/17 0900)  Resp: (!) 9 (07/19/17 0900)  BP: 134/63 (07/19/17 0900)  SpO2: 96 % (07/19/17 0900) Vital Signs (24h Range):  Temp:  [97.6 °F (36.4 °C)-98.6 °F (37 °C)] 98.6 °F (37 °C)  Pulse:  [59-60] 60  Resp:  [0-23] 9  SpO2:  [93 %-100 %] 96 %  BP: (110-158)/() 134/63     Weight: 79.8 kg (175 lb 14.8 oz)  Body mass index is 26.75 kg/m².      Intake/Output Summary (Last 24 hours) at 07/19/17 1206  Last data filed at 07/19/17 1100   Gross per 24 hour   Intake           912.95 ml   Output             2560 ml   Net         -1647.05 ml       Hemodynamic Parameters:       Telemetry: V paced, no events    Physical Exam  Constitutional: NAD, conversant  HEENT: Sclera anicteric, PERRLA, EOMI  Neck: Positive JVD up to angle of jaw, no carotid bruits  CV: RRR, no murmur, normal S1/S2, +S3, No Pericardial rub  Pulm: CTAB with  "no wheezes, rales, or rhonchi  GI: Abdomen soft, NTND, +BS  Extremities: No LE edema, warm and well perfused, No cyanosis, No clubbing, left groin IABP in place w/o evidence of hematoma  Skin: No ecchymosis, erythema, or ulcers  Psych: AOx3, appropriate affect  Neuro: CNII-XII intact, no focal deficits        Significant Labs:  CBC:    Recent Labs  Lab 07/19/17  0401   WBC 4.77   RBC 4.41*   HGB 12.8*   HCT 37.2*      MCV 84   MCH 29.0   MCHC 34.4     BNP:    Recent Labs  Lab 07/18/17  1138   BNP 2,787*     CMP:    Recent Labs  Lab 07/19/17  0401   *   CALCIUM 9.6   ALBUMIN 3.2*   PROT 7.0   *   K 3.9   CO2 25   CL 96   BUN 20   CREATININE 1.6*   ALKPHOS 53*   ALT 12   AST 17   BILITOT 1.8*      Coagulation:     Recent Labs  Lab 07/18/17  1848 07/19/17  0401   INR  --  1.3*   APTT 21.7  --      LDH:  No results for input(s): LDH in the last 72 hours.  Microbiology:  Microbiology Results (last 7 days)     ** No results found for the last 168 hours. **          I have reviewed all pertinent labs within the past 24 hours.    Estimated Creatinine Clearance: 43.3 mL/min (based on Cr of 1.6).    Diagnostic Results:  I have reviewed and interpreted all pertinent imaging results/findings within the past 24 hours.    CXR: Syncope and collapse".  Pacemaker/AICD remains. The cardiac silhouette is  enlarged.  Pulmonary vascularity is mildly increased.  The lungs are free of lobar consolidation and alveolar edema and similar to 7/6/17.  There is no large pleural effusion or pneumothorax.  Visualized osseous structures are stable.     TTE (7/3/17):  1 - Severely depressed left ventricular systolic function (EF 10-15%).     2 - Biatrial enlargement.     3 - Restrictive LV filling pattern, indicating markedly elevated LAP (grade 3 diastolic dysfunction).     4 - Right ventricular enlargement with moderately to severely depressed systolic function.     5 - Pulmonary hypertension. The estimated PA systolic " pressure is 62 mmHg.     6 - Mild mitral regurgitation.     7 - Mild tricuspid regurgitation.     8 - Increased central venous pressure.      RHC (7/7/17):  Significantly reduced CO/CI on dobutamine 5 mcg/kg/min.    Normal right and moderately elevated left sided filling pressure.    Moderate pulmonary hypertension (64/17, 34).    TPG 14  PVR  3.84.    With nitroglycerin 0.8mg SL, PA pressures dropped to 54 systolic, with PCWP dropping to 13. Repeat SVO2 from distal PA 63%, bringing CI 1.9.    Assessment and Plan:   67 y.o. gentleman with PMH of NICM (EF 10%) on home  at 5 mcg/kg/min, esophagitis, HTN, HLD, s/p Medtronic CRT-D who presented to the hospital after undergoing a 6MWT and developed syncope followed by ICD shocks x 2. Admitted for ADHF/VT s/p ICD shocks. Final work-up prior to setting up LVAD date.       * Acute on chronic combined systolic and diastolic heart failure    - c/w  at 5 mcg/kg/min  - c/w lasix 20 mg/hr  - IABP 1:1 support with diastolic augmentation 140s  - CXR reviewed which reveals stable positioning  - D/c delgado catheter  - CVP 9, SVO2 64%  - Net negative 1.1L in the last 24 hours  - adv options work-up complete except need ID consultation for clearance prior to LVAD sx        ICD (implantable cardioverter-defibrillator) discharge    - appropriate in the setting of VT aggravated by underlying AT/AFL        V-tach    - c/w amiodarone gtt to complete 24 hour protocol  - amiodarone 400 mg PO BID post infusion  - monitor lytes; keep K>4.0, Mg>2.0  - appreciate EP recs        Atrial tachycardia    - [id number] - 3  - c/w amiodarone  - c/w heparin gtt        INDIRA (acute kidney injury)    - pre-renal due to hypervolemia  - c/w lasix gtt  - improving  - avoid nephrotoxic agents        Hepatitis B core antibody positive since 2012    - will defer liver biopsy as CTS not requiring it  - ID consult in place for clearance for VAD sx  - no evidence of liver failure  - TBILI trending down  -  AST/ALT WNL  - case discussed with hepatology, low suspicion for liver involvement        Hyperlipidemia    - c/w pravastatin          Case discussed with attending.    Lorena Payton MD  Heart Transplant  [facility]

## 2017-07-19 NOTE — CONSULTS
[facility]  Infectious Disease  Consult Note      Inpatient consult to Infectious Diseases  Consult performed by: AUNDREA LANDIN  Consult ordered by: SNEHAL RG        Assessment/Plan:     * Acute on chronic combined systolic and diastolic heart failure    Consult received for LVAD clearance.  Full consult to follow

## 2017-07-19 NOTE — CONSULTS
[facility]  Infectious Disease  Consult Note    Patient Name: uSman Hayden  MRN: [id number]  Admission Date: 7/18/2017  Hospital Length of Stay: 1 days  Attending Physician: Angie Torres MD  Primary Care Provider: Joe Ernst MD     Isolation Status: No active isolations    Patient information was obtained from patient and past medical records.      Consults  Assessment/Plan:     * Acute on chronic combined systolic and diastolic heart failure    68 y/o male with PMH of NICM (EF 10%) on home  at 5 mcg/kg/min, esophagitis, HTN, HLD, s/p Medtronic CRT-D who presented to the hospital after syncope followed by ICD shocks x 2. Being evaluated for advanced heart options, such as LVAD; ID consulted for clearance:  - no active signs or symptoms of infection  - afebrile, no leukocytosis and clinically stable  - no skin rashes, lesions, or open sores  - prevnar UTD; wife thinks he had pneumovax (will contact physician and call to let me know)  - will order TDAP  - no obvious contraindications to LVAD placement from ID standpoint at this time  - ID will sign off            Thank you for your consult. I will sign off. Please contact us if you have any additional questions.  MELISSA Baron, pager: [phone]  Infectious Disease  [facility]    Subjective:     Principal Problem: Acute on chronic combined systolic and diastolic heart failure    HPI: This is a 67 year old male with NICM on home dobutamine, HTN, HLD, s/p medtronic who presented after ICD shocks x 2. He is now being worked up for advanced options. He currently has IABP. He is being evaluated for LVAD. Patient denies fevers, chills, or sweats. He denies recent infections. He denies skin issues, rashes, or lesions. He denies pain. No urinary symptoms. Denies diarrhea.    Past Medical History:   Diagnosis Date    Cardiomyopathy     Hyperlipidemia     Hypertension     Obesity     S/P implantation of automatic  cardioverter/defibrillator (AICD)     Ventricular tachycardia        Past Surgical History:   Procedure Laterality Date    CARDIAC CATHETERIZATION      CARDIAC DEFIBRILLATOR PLACEMENT         Review of patient's allergies indicates:  No Known Allergies    Medications:  Prescriptions Prior to Admission   Medication Sig    amiodarone (PACERONE) 200 MG Tab Take by mouth once daily.    aspirin 81 MG Chew Take 81 mg by mouth once daily.    DOBUTamine (DOBUTREX) 1,000 mg/250 mL (4,000 mcg/mL) infusion Inject 414 mcg/min into the vein continuous.    furosemide (LASIX) 40 MG tablet Take 1 tablet (40 mg total) by mouth 2 (two) times daily.    potassium chloride SA (K-DUR,KLOR-CON) 20 MEQ tablet Take 20 mEq by mouth once daily.     pravastatin (PRAVACHOL) 20 MG tablet Take 20 mg by mouth every evening.    spironolactone (ALDACTONE) 25 MG tablet Take 1 tablet (25 mg total) by mouth once daily.     Antibiotics     None        Antifungals     None        Antivirals     None           There is no immunization history for the selected administration types on file for this patient.    Family History     None        Social History     Social History    Marital status:      Spouse name: N/A    Number of children: N/A    Years of education: N/A     Social History Main Topics    Smoking status: Never Smoker    Smokeless tobacco: Never Used    Alcohol use No    Drug use: Unknown    Sexual activity: Not Asked     Other Topics Concern    None     Social History Narrative    None     Review of Systems   Constitutional: Negative for chills and fever.   Respiratory: Negative for cough and shortness of breath.    Cardiovascular: Negative for chest pain and leg swelling.   Gastrointestinal: Negative for abdominal pain, constipation, diarrhea, nausea and vomiting.   Genitourinary: Negative for dysuria, frequency and hematuria.   Musculoskeletal: Negative for back pain and myalgias.   Skin: Negative for rash and wound.    Neurological: Negative for dizziness, light-headedness and headaches.   Psychiatric/Behavioral: Negative for agitation and behavioral problems. The patient is not nervous/anxious.      Objective:     Vital Signs (Most Recent):  Temp: 97.8 °F (36.6 °C) (07/19/17 1100)  Pulse: (!) 59 (07/19/17 1400)  Resp: (!) 21 (07/19/17 1400)  BP: 128/88 (07/19/17 1300)  SpO2: 98 % (07/19/17 1200) Vital Signs (24h Range):  Temp:  [97.6 °F (36.4 °C)-98.6 °F (37 °C)] 97.8 °F (36.6 °C)  Pulse:  [59-64] 59  Resp:  [0-23] 21  SpO2:  [91 %-100 %] 98 %  BP: (115-162)/() 128/88     Weight: 79.8 kg (175 lb 14.8 oz)  Body mass index is 26.75 kg/m².    Estimated Creatinine Clearance: 43.3 mL/min (based on Cr of 1.6).    Physical Exam   Constitutional: He is oriented to person, place, and time. He appears well-developed and well-nourished. No distress.   Lying flat   HENT:   Head: Normocephalic and atraumatic.   Cardiovascular: Normal rate, regular rhythm and normal heart sounds.  Exam reveals no gallop and no friction rub.    No murmur heard.  IABP sounds   Pulmonary/Chest: Effort normal and breath sounds normal. No respiratory distress. He has no wheezes. He has no rales.   Abdominal: Soft. Bowel sounds are normal. He exhibits no distension and no mass. There is no hepatosplenomegaly. There is no tenderness. There is no rebound and no guarding.   Musculoskeletal: He exhibits no edema.   Neurological: He is alert and oriented to person, place, and time.   Skin: Skin is warm, dry and intact. No rash noted.        Psychiatric: He has a normal mood and affect. His behavior is normal.       Significant Labs:   Blood Culture: No results for input(s): LABBLOO in the last 4320 hours.  CBC:   Recent Labs  Lab 07/18/17  1138 07/18/17  1149 07/19/17  0401   WBC 5.53  --  4.77   HGB 14.3  --  12.8*   HCT 41.7 48 37.2*     --  199     CMP:   Recent Labs  Lab 07/18/17  1350 07/19/17  0004 07/19/17  0401   * 132* 134*   K 3.9 3.9 3.9    CL 94* 96 96   CO2 24 26 25   * 122* 111*   BUN 20 20 20   CREATININE 1.8* 1.6* 1.6*   CALCIUM 13.7* 9.8 9.6   PROT 7.3 7.2 7.0   ALBUMIN 3.3* 3.3* 3.2*   BILITOT 2.2* 2.0* 1.8*   ALKPHOS 54* 55 53*   AST 17 16 17   ALT 13 13 12   ANIONGAP 12 10 13   EGFRNONAA 38.1* 43.9* 43.9*       Significant Imaging: I have reviewed all pertinent imaging results/findings within the past 24 hours.

## 2017-07-19 NOTE — SUBJECTIVE & OBJECTIVE
Past Medical History:   Diagnosis Date    Cardiomyopathy     Hyperlipidemia     Hypertension     Obesity     S/P implantation of automatic cardioverter/defibrillator (AICD)     Ventricular tachycardia        Past Surgical History:   Procedure Laterality Date    CARDIAC CATHETERIZATION      CARDIAC DEFIBRILLATOR PLACEMENT         Review of patient's allergies indicates:  No Known Allergies    Medications:  Prescriptions Prior to Admission   Medication Sig    amiodarone (PACERONE) 200 MG Tab Take by mouth once daily.    aspirin 81 MG Chew Take 81 mg by mouth once daily.    DOBUTamine (DOBUTREX) 1,000 mg/250 mL (4,000 mcg/mL) infusion Inject 414 mcg/min into the vein continuous.    furosemide (LASIX) 40 MG tablet Take 1 tablet (40 mg total) by mouth 2 (two) times daily.    potassium chloride SA (K-DUR,KLOR-CON) 20 MEQ tablet Take 20 mEq by mouth once daily.     pravastatin (PRAVACHOL) 20 MG tablet Take 20 mg by mouth every evening.    spironolactone (ALDACTONE) 25 MG tablet Take 1 tablet (25 mg total) by mouth once daily.     Antibiotics     None        Antifungals     None        Antivirals     None           There is no immunization history for the selected administration types on file for this patient.    Family History     None        Social History     Social History    Marital status:      Spouse name: N/A    Number of children: N/A    Years of education: N/A     Social History Main Topics    Smoking status: Never Smoker    Smokeless tobacco: Never Used    Alcohol use No    Drug use: Unknown    Sexual activity: Not Asked     Other Topics Concern    None     Social History Narrative    None     Review of Systems   Constitutional: Negative for chills and fever.   Respiratory: Negative for cough and shortness of breath.    Cardiovascular: Negative for chest pain and leg swelling.   Gastrointestinal: Negative for abdominal pain, constipation, diarrhea, nausea and vomiting.    Genitourinary: Negative for dysuria, frequency and hematuria.   Musculoskeletal: Negative for back pain and myalgias.   Skin: Negative for rash and wound.   Neurological: Negative for dizziness, light-headedness and headaches.   Psychiatric/Behavioral: Negative for agitation and behavioral problems. The patient is not nervous/anxious.      Objective:     Vital Signs (Most Recent):  Temp: 97.8 °F (36.6 °C) (07/19/17 1100)  Pulse: (!) 59 (07/19/17 1400)  Resp: (!) 21 (07/19/17 1400)  BP: 128/88 (07/19/17 1300)  SpO2: 98 % (07/19/17 1200) Vital Signs (24h Range):  Temp:  [97.6 °F (36.4 °C)-98.6 °F (37 °C)] 97.8 °F (36.6 °C)  Pulse:  [59-64] 59  Resp:  [0-23] 21  SpO2:  [91 %-100 %] 98 %  BP: (115-162)/() 128/88     Weight: 79.8 kg (175 lb 14.8 oz)  Body mass index is 26.75 kg/m².    Estimated Creatinine Clearance: 43.3 mL/min (based on Cr of 1.6).    Physical Exam   Constitutional: He is oriented to person, place, and time. He appears well-developed and well-nourished. No distress.   Lying flat   HENT:   Head: Normocephalic and atraumatic.   Cardiovascular: Normal rate, regular rhythm and normal heart sounds.  Exam reveals no gallop and no friction rub.    No murmur heard.  IABP sounds   Pulmonary/Chest: Effort normal and breath sounds normal. No respiratory distress. He has no wheezes. He has no rales.   Abdominal: Soft. Bowel sounds are normal. He exhibits no distension and no mass. There is no hepatosplenomegaly. There is no tenderness. There is no rebound and no guarding.   Musculoskeletal: He exhibits no edema.   Neurological: He is alert and oriented to person, place, and time.   Skin: Skin is warm, dry and intact. No rash noted.        Psychiatric: He has a normal mood and affect. His behavior is normal.       Significant Labs:   Blood Culture: No results for input(s): LABBLOO in the last 4320 hours.  CBC:   Recent Labs  Lab 07/18/17  1138 07/18/17  1149 07/19/17  0401   WBC 5.53  --  4.77   HGB 14.3  --   12.8*   HCT 41.7 48 37.2*     --  199     CMP:   Recent Labs  Lab 07/18/17  1350 07/19/17  0004 07/19/17  0401   * 132* 134*   K 3.9 3.9 3.9   CL 94* 96 96   CO2 24 26 25   * 122* 111*   BUN 20 20 20   CREATININE 1.8* 1.6* 1.6*   CALCIUM 13.7* 9.8 9.6   PROT 7.3 7.2 7.0   ALBUMIN 3.3* 3.3* 3.2*   BILITOT 2.2* 2.0* 1.8*   ALKPHOS 54* 55 53*   AST 17 16 17   ALT 13 13 12   ANIONGAP 12 10 13   EGFRNONAA 38.1* 43.9* 43.9*       Significant Imaging: I have reviewed all pertinent imaging results/findings within the past 24 hours.

## 2017-07-19 NOTE — ASSESSMENT & PLAN NOTE
- c/w  at 5 mcg/kg/min  - c/w lasix 20 mg/hr  - IABP 1:1 support with diastolic augmentation 140s  - CXR reviewed which reveals stable positioning  - D/c delgado catheter  - CVP 9, SVO2 64%  - Net negative 1.1L in the last 24 hours  - adv options work-up complete except need ID consultation for clearance prior to LVAD sx

## 2017-07-19 NOTE — HOSPITAL COURSE
67 y.o. gentleman with PMH of NICM (EF 10%) on home  at 5 mcg/kg/min, esophagitis, HTN, HLD, s/p Medtronic CRT-D who presented to the hospital after undergoing a 6MWT and developed syncope followed by ICD shocks x 2. He reports not feeling any of them.. He has already been worked up for LVAD/OHTx and was in the process of getting a VAD however, he ended up recently being diagnosed with Hep B. He was evaluated by Hepatology who wants to repeat viral studies/US and also wants him to undergo liver biopsy.      He was seen by Dr. Leos earlier during the month and was also admitted for ADHF. He was sent home on  5 mcg/kg/min and lasix 40 mg BID. He noted increased SOB (NYHA Class III), 3 pillow orthopnea, decreased appetite and generalized weakness. He reports compliance with low salt diet and medication regimen. He reports being hospitalized 6 times in the last year in [facility] for ADHF. He reports having a recent LHC which showed clean coronaries (as per patient).      Device interrogation revealed ICD shock x 5.  7/14 x1, 7/17 x2, 7/18 x2  He was amditted to CMICU and underwent LVAD placement HeartMate 3 Implanted 7/27/2017 as DT. He then had very complcated post op course including delayed chest closure (7/28/17) and extubated (7/29/17), reintubated 8/9/17 and extubated 8/13/17. He was transferred to the floor on  gtt and that evening was moved back to SICU after he suffered a cardiac arrest. He was reintubated and placed on multiple pressors. He suffered a GI bleed at that time and GI was consulted and he was transfused. His pressor requirements decreased and he was extubated; however, he then developed an ileus requiring NG tube placement for decompression and NPO. He was severly debilitated and PT was consulted. Once on  gtt alone he was transferred back to the floor and aggressive PT done. He was resumed on low dose aspirin and coumadin for a goal INR 2-3 and had no further GI bleeding.  Once stable for home discharge, he was sent home for a follow up in one week as well as home health PT. He will continue on  2.5 mcg/kg/min IV for RV failure and this can be weaned as an outpatient

## 2017-07-19 NOTE — PROGRESS NOTES
Pt and wife AAAO.  Continued VAD education with them. All questions answered to their satisfaction as evidence by verbal acknowledgement. Edwige, CRC screening pt for HM 3.  Will bring a patient handbook once decision is made as to which device he will receive.

## 2017-07-19 NOTE — ASSESSMENT & PLAN NOTE
- c/w amiodarone gtt to complete 24 hour protocol  - amiodarone 400 mg PO BID post infusion  - monitor lytes; keep K>4.0, Mg>2.0  - appreciate EP recs

## 2017-07-19 NOTE — ASSESSMENT & PLAN NOTE
68 y/o male with PMH of NICM (EF 10%) on home  at 5 mcg/kg/min, esophagitis, HTN, HLD, s/p Medtronic CRT-D who presented to the hospital after syncope followed by ICD shocks x 2. Being evaluated for advanced heart options, such as LVAD; ID consulted for clearance:  - no active signs or symptoms of infection  - afebrile, no leukocytosis and clinically stable  - no skin rashes, lesions, or open sores  - prevnar UTD; wife thinks he had pneumovax (will contact physician and call to let me know)  - will order TDAP  - no obvious contraindications to LVAD placement from ID standpoint at this time  - ID will sign off

## 2017-07-19 NOTE — HPI
67 y.o. gentleman with PMH of NICM (EF 10%) on home  at 5 mcg/kg/min, esophagitis, HTN, HLD, s/p Medtronic CRT-D who presented to the hospital after undergoing a 6MWT and developed syncope followed by ICD shocks x 2. He reports not feeling any of them.. He has already been worked up for LVAD/OHTx and was in the process of getting a VAD however, he ended up recently being diagnosed with Hep B. He was evaluated by Hepatology who wants to repeat viral studies/US and also wants him to undergo liver biopsy.      He was seen by Dr. Leos earlier during the month and was also admitted for ADHF. He was sent home on  5 mcg/kg/min and lasix 40 mg BID. He noted increased SOB (NYHA Class III), 3 pillow orthopnea, decreased appetite and generalized weakness. He reports compliance with low salt diet and medication regimen. He reports being hospitalized 6 times in the last year in [facility] for ADHF. He reports having a recent LHC which showed clean coronaries (as per patient).      Device interrogation revealed ICD shock x 5.  7/14 x1, 7/17 x2, 7/18 x2

## 2017-07-19 NOTE — PLAN OF CARE
Problem: Patient Care Overview  Goal: Plan of Care Review  POC updated and reviewed at bedside with PT and PT's wife.    PT has had a pleasant shift free from acute events and seems to be progressing overall.    IABP remains 1:1 EKG trigger, with no alarms and site free from complications. Augmentation consistently at least 20 mmHg above systolic (Aug 120 s to 140 s mmHg).     CVP most recently 8 mmHg (end exp lying flat) and although delgado removed PT voiding about 100 mL/hr to urinal.    All other VSS.

## 2017-07-19 NOTE — PROGRESS NOTES
Patient with UO 150cc/hr overnight  CVP progressively decreased from 14-15 down to 8 this morning  SVO2 stable between 64-68  Creatinine 1.6, Tbili downtrending  Patient overall stable overnight after IABP placement  Communicated with staff throughout night

## 2017-07-19 NOTE — PROGRESS NOTES
Cardiology Progress Note    SUBJECTIVE:   S/p successful IABP yesterday. Started on heparin and amiodarone IV gtt. Rate controlled overnight, AV paced at 60 bpm, no events. Doing well this am, without complaint.    OBJECTIVE:     Vital Signs (Most Recent)  Temp: 98.6 °F (37 °C) (07/19/17 0700)  Pulse: 60 (07/19/17 0900)  Resp: (!) 9 (07/19/17 0900)  BP: 134/63 (07/19/17 0900)  SpO2: 96 % (07/19/17 0900)    Vital Signs Range (Last 24H):  Temp:  [97.6 °F (36.4 °C)-98.6 °F (37 °C)]   Pulse:  [59-80]   Resp:  [0-40]   BP: (110-158)/()   SpO2:  [93 %-100 %]     Physical Exam:  GEN: NAD, alert and oriented  HEENT: +JVD  Cv: RRR, no murmur, no rubs, gallups  PULM: CTAB, EWOB  ABD: soft, NT/ND  EXT: 1+ ANTHONY  Neuro: no focal deficits    Laboratory:  Chemistry:  Lab Results   Component Value Date     (L) 07/19/2017    K 3.9 07/19/2017    CL 96 07/19/2017    CO2 25 07/19/2017    BUN 20 07/19/2017    CREATININE 1.6 (H) 07/19/2017    CALCIUM 9.6 07/19/2017    BNP 2,787 (H) 07/18/2017     CBC:   Lab Results   Component Value Date    WBC 4.77 07/19/2017    HGB 12.8 (L) 07/19/2017    HCT 37.2 (L) 07/19/2017    HCT 48 07/18/2017    MCV 84 07/19/2017     07/19/2017       Diagnostic Results:  Tele reviewed: AV paced, occ PVC      Current Facility-Administered Medications:     amiodarone 360 mg/200 mL (1.8 mg/mL) infusion, 0.5 mg/min, Intravenous, Continuous, Casey Newman MD, Last Rate: 16.7 mL/hr at 07/19/17 0900, 0.5 mg/min at 07/19/17 0900    amiodarone tablet 400 mg, 400 mg, Oral, BID, Lorena Payton MD    bisacodyl suppository 10 mg, 10 mg, Rectal, Daily PRN, Lorena Payton MD    DOBUtamine 1000 mg in D5W 250 mL infusion (premix non-titrating), 5 mcg/kg/min (Order-Specific), Intravenous, Continuous, Lorena Payton MD, Last Rate: 7.1 mL/hr at 07/19/17 0900, 5 mcg/kg/min at 07/19/17 0900    famotidine tablet 20 mg, 20 mg, Oral, Daily, Angie Torres MD, 20 mg at 07/19/17 1051     furosemide (LASIX) 500 mg in sodium chloride 0.9% 100 mL infusion, 20 mg/hr, Intravenous, Continuous, Angie Torres MD, Last Rate: 4 mL/hr at 07/19/17 0900, 20 mg/hr at 07/19/17 0900    heparin 25,000 units in dextrose 5% 250 mL (100 units/mL) infusion, 17 Units/kg/hr, Intravenous, Continuous, Lorena Payton MD, Last Rate: 13.6 mL/hr at 07/19/17 0821, 17 Units/kg/hr at 07/19/17 0821    ondansetron injection 8 mg, 8 mg, Intravenous, Q8H PRN, Angie Torres MD, 8 mg at 07/18/17 2130    polyethylene glycol packet 17 g, 17 g, Oral, Daily, Casey Newman MD, 17 g at 07/19/17 0823    pravastatin tablet 20 mg, 20 mg, Oral, QHS, Lorena Payton MD, 20 mg at 07/18/17 2129    senna tablet 8.6 mg, 8.6 mg, Oral, Daily, Casey Newman MD, 8.6 mg at 07/19/17 0823    sodium chloride 0.9% flush 3 mL, 3 mL, Intravenous, Q8H, Lorena Payton MD, 3 mL at 07/19/17 0623    ASSESSMENT/PLAN:   Mr. Hayden is a 67 year old man with advanced NICM (LVEF 10%) awaiting LVAD, HTN HLD who presented with syncope and ICD discharge for VT. Underlying his VT, he also has an organized atrial tachycardia which appears to be new from his EGM since May 2017. He is acutely ill and would benefit from aggressive AAD therapy and treatment of his decompensated heart failure. Furthermore, with newly discovered atrial tachyarrhythmia, he would benefit from full anticoagulation in conjunction with AAD therapy.    -Started on amiodarone IV load yesterday, at ~18 hrs  -Transition to 400 BID once IV load complete x2 weeks then 400 mg daily thereafter  -On heparin gtt to prevent RUTH thrombus and CVA given atrial tachyarrhythmia and high CVA risk  -will discuss more definitive arrhythmia strategies once he is stabilized    Plan discussed with attending Dr. Kimble, further recommendations as per attending addendum. Please call with any questions or concerns.    Charbel Chavira MD  Cardiology PGY4  [phone]

## 2017-07-19 NOTE — ASSESSMENT & PLAN NOTE
- will defer liver biopsy as CTS not requiring it  - ID consult in place for clearance for VAD sx  - no evidence of liver failure  - TBILI trending down  - AST/ALT WNL  - case discussed with hepatology, low suspicion for liver involvement

## 2017-07-19 NOTE — SUBJECTIVE & OBJECTIVE
Interval History: NAEON. Feels significantly better today with the balloon pump.     Continuous Infusions:   amiodarone 0.5 mg/min (07/19/17 0900)    DOBUTamine 5 mcg/kg/min (07/19/17 0900)    furosemide (LASIX) 5 mg/mL infusion (non-titrating) 20 mg/hr (07/19/17 0900)    heparin (porcine) in D5W 17 Units/kg/hr (07/19/17 0821)     Scheduled Meds:   amiodarone  400 mg Oral BID    famotidine  20 mg Oral Daily    polyethylene glycol  17 g Oral Daily    pravastatin  20 mg Oral QHS    senna  8.6 mg Oral Daily    sodium chloride 0.9%  3 mL Intravenous Q8H     PRN Meds:bisacodyl, ondansetron    Review of patient's allergies indicates:  No Known Allergies  Objective:     Vital Signs (Most Recent):  Temp: 98.6 °F (37 °C) (07/19/17 0700)  Pulse: 60 (07/19/17 0900)  Resp: (!) 9 (07/19/17 0900)  BP: 134/63 (07/19/17 0900)  SpO2: 96 % (07/19/17 0900) Vital Signs (24h Range):  Temp:  [97.6 °F (36.4 °C)-98.6 °F (37 °C)] 98.6 °F (37 °C)  Pulse:  [59-60] 60  Resp:  [0-23] 9  SpO2:  [93 %-100 %] 96 %  BP: (110-158)/() 134/63     Weight: 79.8 kg (175 lb 14.8 oz)  Body mass index is 26.75 kg/m².      Intake/Output Summary (Last 24 hours) at 07/19/17 1206  Last data filed at 07/19/17 1100   Gross per 24 hour   Intake           912.95 ml   Output             2560 ml   Net         -1647.05 ml       Hemodynamic Parameters:       Telemetry: V paced, no events    Physical Exam  Constitutional: NAD, conversant  HEENT: Sclera anicteric, PERRLA, EOMI  Neck: Positive JVD up to angle of jaw, no carotid bruits  CV: RRR, no murmur, normal S1/S2, +S3, No Pericardial rub  Pulm: CTAB with no wheezes, rales, or rhonchi  GI: Abdomen soft, NTND, +BS  Extremities: No LE edema, warm and well perfused, No cyanosis, No clubbing, left groin IABP in place w/o evidence of hematoma  Skin: No ecchymosis, erythema, or ulcers  Psych: AOx3, appropriate affect  Neuro: CNII-XII intact, no focal deficits        Significant Labs:  CBC:    Recent  Labs  Lab 07/19/17  0401   WBC 4.77   RBC 4.41*   HGB 12.8*   HCT 37.2*      MCV 84   MCH 29.0   MCHC 34.4     BNP:    Recent Labs  Lab 07/18/17  1138   BNP 2,787*     CMP:    Recent Labs  Lab 07/19/17  0401   *   CALCIUM 9.6   ALBUMIN 3.2*   PROT 7.0   *   K 3.9   CO2 25   CL 96   BUN 20   CREATININE 1.6*   ALKPHOS 53*   ALT 12   AST 17   BILITOT 1.8*      Coagulation:     Recent Labs  Lab 07/18/17  1848 07/19/17  0401   INR  --  1.3*   APTT 21.7  --      LDH:  No results for input(s): LDH in the last 72 hours.  Microbiology:  Microbiology Results (last 7 days)     ** No results found for the last 168 hours. **          I have reviewed all pertinent labs within the past 24 hours.    Estimated Creatinine Clearance: 43.3 mL/min (based on Cr of 1.6).    Diagnostic Results:  I have reviewed and interpreted all pertinent imaging results/findings within the past 24 hours.

## 2017-07-20 ENCOUNTER — CONFERENCE (OUTPATIENT)
Dept: TRANSPLANT | Facility: CLINIC | Age: 67
End: 2017-07-20

## 2017-07-20 ENCOUNTER — RESEARCH ENCOUNTER (OUTPATIENT)
Dept: RESEARCH | Facility: HOSPITAL | Age: 67
End: 2017-07-20

## 2017-07-20 LAB
ALBUMIN SERPL BCP-MCNC: 3.2 G/DL
ALLENS TEST: ABNORMAL
ALP SERPL-CCNC: 61 U/L
ALT SERPL W/O P-5'-P-CCNC: 10 U/L
ANION GAP SERPL CALC-SCNC: 10 MMOL/L
ANION GAP SERPL CALC-SCNC: 8 MMOL/L
ANION GAP SERPL CALC-SCNC: 9 MMOL/L
AST SERPL-CCNC: 15 U/L
BASOPHILS # BLD AUTO: 0.03 K/UL
BASOPHILS NFR BLD: 0.5 %
BILIRUB SERPL-MCNC: 1.6 MG/DL
BUN SERPL-MCNC: 19 MG/DL
BUN SERPL-MCNC: 19 MG/DL
BUN SERPL-MCNC: 20 MG/DL
CALCIUM SERPL-MCNC: 9.2 MG/DL
CALCIUM SERPL-MCNC: 9.3 MG/DL
CALCIUM SERPL-MCNC: 9.3 MG/DL
CHLORIDE SERPL-SCNC: 91 MMOL/L
CHLORIDE SERPL-SCNC: 92 MMOL/L
CHLORIDE SERPL-SCNC: 93 MMOL/L
CO2 SERPL-SCNC: 29 MMOL/L
CO2 SERPL-SCNC: 29 MMOL/L
CO2 SERPL-SCNC: 30 MMOL/L
CREAT SERPL-MCNC: 1.6 MG/DL
CREAT SERPL-MCNC: 1.6 MG/DL
CREAT SERPL-MCNC: 1.7 MG/DL
DELSYS: ABNORMAL
DIFFERENTIAL METHOD: ABNORMAL
EOSINOPHIL # BLD AUTO: 0.1 K/UL
EOSINOPHIL NFR BLD: 2.3 %
ERYTHROCYTE [DISTWIDTH] IN BLOOD BY AUTOMATED COUNT: 15.4 %
EST. GFR  (AFRICAN AMERICAN): 47.2 ML/MIN/1.73 M^2
EST. GFR  (AFRICAN AMERICAN): 50.8 ML/MIN/1.73 M^2
EST. GFR  (AFRICAN AMERICAN): 50.8 ML/MIN/1.73 M^2
EST. GFR  (NON AFRICAN AMERICAN): 40.8 ML/MIN/1.73 M^2
EST. GFR  (NON AFRICAN AMERICAN): 43.9 ML/MIN/1.73 M^2
EST. GFR  (NON AFRICAN AMERICAN): 43.9 ML/MIN/1.73 M^2
FACT X PPP CHRO-ACNC: 0.6 IU/ML
FACT X PPP CHRO-ACNC: 0.8 IU/ML
FIO2: 21
GLUCOSE SERPL-MCNC: 100 MG/DL
GLUCOSE SERPL-MCNC: 103 MG/DL
GLUCOSE SERPL-MCNC: 96 MG/DL
HCO3 UR-SCNC: 31.8 MMOL/L (ref 24–28)
HCT VFR BLD AUTO: 38.4 %
HGB BLD-MCNC: 12.8 G/DL
INR PPP: 1.2
LYMPHOCYTES # BLD AUTO: 1.2 K/UL
LYMPHOCYTES NFR BLD: 21.4 %
MAGNESIUM SERPL-MCNC: 1.9 MG/DL
MAGNESIUM SERPL-MCNC: 1.9 MG/DL
MAGNESIUM SERPL-MCNC: 2.1 MG/DL
MCH RBC QN AUTO: 28.3 PG
MCHC RBC AUTO-ENTMCNC: 33.3 G/DL
MCV RBC AUTO: 85 FL
MODE: ABNORMAL
MONOCYTES # BLD AUTO: 0.8 K/UL
MONOCYTES NFR BLD: 13.5 %
NEUTROPHILS # BLD AUTO: 3.5 K/UL
NEUTROPHILS NFR BLD: 62.1 %
PCO2 BLDA: 47.7 MMHG (ref 35–45)
PH SMN: 7.43 [PH] (ref 7.35–7.45)
PHOSPHATE SERPL-MCNC: 4.6 MG/DL
PLATELET # BLD AUTO: 203 K/UL
PMV BLD AUTO: 11.6 FL
PO2 BLDA: 33 MMHG (ref 40–60)
POC BE: 8 MMOL/L
POC SATURATED O2: 64 % (ref 95–100)
POC TCO2: 33 MMOL/L (ref 24–29)
POTASSIUM SERPL-SCNC: 3.7 MMOL/L
POTASSIUM SERPL-SCNC: 3.7 MMOL/L
POTASSIUM SERPL-SCNC: 4 MMOL/L
POTASSIUM SERPL-SCNC: 4.2 MMOL/L
PROT SERPL-MCNC: 7.2 G/DL
PROTHROMBIN TIME: 12.9 SEC
RBC # BLD AUTO: 4.53 M/UL
SAMPLE: ABNORMAL
SITE: ABNORMAL
SODIUM SERPL-SCNC: 129 MMOL/L
SODIUM SERPL-SCNC: 130 MMOL/L
SODIUM SERPL-SCNC: 132 MMOL/L
SP02: 99
WBC # BLD AUTO: 5.62 K/UL

## 2017-07-20 PROCEDURE — 83735 ASSAY OF MAGNESIUM: CPT | Mod: 91

## 2017-07-20 PROCEDURE — 84100 ASSAY OF PHOSPHORUS: CPT

## 2017-07-20 PROCEDURE — 80053 COMPREHEN METABOLIC PANEL: CPT

## 2017-07-20 PROCEDURE — 25000003 PHARM REV CODE 250: Performed by: STUDENT IN AN ORGANIZED HEALTH CARE EDUCATION/TRAINING PROGRAM

## 2017-07-20 PROCEDURE — 25000003 PHARM REV CODE 250: Performed by: INTERNAL MEDICINE

## 2017-07-20 PROCEDURE — 99233 SBSQ HOSP IP/OBS HIGH 50: CPT | Mod: ,,, | Performed by: INTERNAL MEDICINE

## 2017-07-20 PROCEDURE — 84132 ASSAY OF SERUM POTASSIUM: CPT

## 2017-07-20 PROCEDURE — 82803 BLOOD GASES ANY COMBINATION: CPT

## 2017-07-20 PROCEDURE — 85520 HEPARIN ASSAY: CPT | Mod: 91

## 2017-07-20 PROCEDURE — 63600175 PHARM REV CODE 636 W HCPCS: Performed by: INTERNAL MEDICINE

## 2017-07-20 PROCEDURE — 83735 ASSAY OF MAGNESIUM: CPT

## 2017-07-20 PROCEDURE — 80048 BASIC METABOLIC PNL TOTAL CA: CPT

## 2017-07-20 PROCEDURE — 85610 PROTHROMBIN TIME: CPT

## 2017-07-20 PROCEDURE — 94799 UNLISTED PULMONARY SVC/PX: CPT

## 2017-07-20 PROCEDURE — 27000202 HC IABP, ADD'L DAY

## 2017-07-20 PROCEDURE — 20000000 HC ICU ROOM

## 2017-07-20 PROCEDURE — 85025 COMPLETE CBC W/AUTO DIFF WBC: CPT

## 2017-07-20 PROCEDURE — 80048 BASIC METABOLIC PNL TOTAL CA: CPT | Mod: 91

## 2017-07-20 RX ORDER — LANOLIN ALCOHOL/MO/W.PET/CERES
400 CREAM (GRAM) TOPICAL ONCE
Status: COMPLETED | OUTPATIENT
Start: 2017-07-20 | End: 2017-07-20

## 2017-07-20 RX ORDER — POTASSIUM CHLORIDE 20 MEQ/15ML
40 SOLUTION ORAL ONCE
Status: COMPLETED | OUTPATIENT
Start: 2017-07-20 | End: 2017-07-20

## 2017-07-20 RX ORDER — LANOLIN ALCOHOL/MO/W.PET/CERES
400 CREAM (GRAM) TOPICAL 2 TIMES DAILY
Status: DISCONTINUED | OUTPATIENT
Start: 2017-07-20 | End: 2017-07-27

## 2017-07-20 RX ADMIN — SODIUM CHLORIDE, PRESERVATIVE FREE 3 ML: 5 INJECTION INTRAVENOUS at 01:07

## 2017-07-20 RX ADMIN — MAGNESIUM OXIDE TAB 400 MG (241.3 MG ELEMENTAL MG) 400 MG: 400 (241.3 MG) TAB at 08:07

## 2017-07-20 RX ADMIN — HEPARIN SODIUM AND DEXTROSE 16 UNITS/KG/HR: 10000; 5 INJECTION INTRAVENOUS at 06:07

## 2017-07-20 RX ADMIN — FAMOTIDINE 20 MG: 20 TABLET, FILM COATED ORAL at 08:07

## 2017-07-20 RX ADMIN — POLYETHYLENE GLYCOL 3350 17 G: 17 POWDER, FOR SOLUTION ORAL at 08:07

## 2017-07-20 RX ADMIN — FUROSEMIDE 20 MG/HR: 10 INJECTION, SOLUTION INTRAVENOUS at 02:07

## 2017-07-20 RX ADMIN — POTASSIUM CHLORIDE 40 MEQ: 20 SOLUTION ORAL at 03:07

## 2017-07-20 RX ADMIN — AMIODARONE HYDROCHLORIDE 400 MG: 200 TABLET ORAL at 08:07

## 2017-07-20 RX ADMIN — SODIUM CHLORIDE, PRESERVATIVE FREE 3 ML: 5 INJECTION INTRAVENOUS at 10:07

## 2017-07-20 RX ADMIN — POTASSIUM CHLORIDE 40 MEQ: 20 SOLUTION ORAL at 04:07

## 2017-07-20 RX ADMIN — PRAVASTATIN SODIUM 20 MG: 20 TABLET ORAL at 08:07

## 2017-07-20 RX ADMIN — HEPARIN SODIUM AND DEXTROSE 17 UNITS/KG/HR: 10000; 5 INJECTION INTRAVENOUS at 12:07

## 2017-07-20 RX ADMIN — SODIUM CHLORIDE, PRESERVATIVE FREE 3 ML: 5 INJECTION INTRAVENOUS at 05:07

## 2017-07-20 RX ADMIN — SENNOSIDES 8.6 MG: 8.6 TABLET, FILM COATED ORAL at 08:07

## 2017-07-20 NOTE — TELEPHONE ENCOUNTER
Suman Hayden was discussed at selection committee on 07/19/17. Patient presented for OHT and VAD. Declined for OHT due to:  Hep B and elevated PA pressures     Patient has an anticipated survival benefit. Patient has NYHA IV symptoms which have failed to respond to optimal medical management.     Decision made to proceed with DT VAD implantation. This has been discussed with the patient and family, including the reasons for why he is not a transplant candidate at this time.     Patient has a continued need for IV inotropic therapy, and is on dobutamine at 5 mcg/kg/min. Patient has not had a CPX due to being on inotropes. Additionally patient is in the CCU with IABP for additional support.

## 2017-07-20 NOTE — PROGRESS NOTES
Pt and wife VERÓNICA.  Mrs. Hayden is working on the community contact numbers.  Will pick them up when complete and will give them a  3 patient handbook. No questions at this time right now.

## 2017-07-20 NOTE — SUBJECTIVE & OBJECTIVE
Interval History: NAEON. Feels well and denies any current complaints. ID cleared the patient for LVAD. LVAD placement scheduled for next Tuesday.    Continuous Infusions:   DOBUTamine 5 mcg/kg/min (07/20/17 1200)    furosemide (LASIX) 5 mg/mL infusion (non-titrating) 20 mg/hr (07/20/17 1200)    heparin (porcine) in D5W 16.04 Units/kg/hr (07/20/17 1200)     Scheduled Meds:   amiodarone  400 mg Oral BID    famotidine  20 mg Oral Daily    polyethylene glycol  17 g Oral Daily    pravastatin  20 mg Oral QHS    senna  8.6 mg Oral Daily    sodium chloride 0.9%  3 mL Intravenous Q8H     PRN Meds:bisacodyl, ondansetron, pneumococcal vaccine    Review of patient's allergies indicates:  No Known Allergies  Objective:     Vital Signs (Most Recent):  Temp: 98.1 °F (36.7 °C) (07/20/17 1100)  Pulse: 60 (07/20/17 1300)  Resp: (!) 21 (07/20/17 1300)  BP: (!) 100/57 (07/20/17 1300)  SpO2: 98 % (07/20/17 1300) Vital Signs (24h Range):  Temp:  [97.8 °F (36.6 °C)-98.7 °F (37.1 °C)] 98.1 °F (36.7 °C)  Pulse:  [59-60] 60  Resp:  [8-27] 21  SpO2:  [94 %-100 %] 98 %  BP: ()/(50-85) 100/57     Weight: 79.8 kg (175 lb 14.8 oz)  Body mass index is 26.75 kg/m².      Intake/Output Summary (Last 24 hours) at 07/20/17 1327  Last data filed at 07/20/17 1300   Gross per 24 hour   Intake          1490.83 ml   Output             3865 ml   Net         -2374.17 ml       Hemodynamic Parameters:   CVP 6, SVO2 64%    Telemetry: no events    Physical Exam   Constitutional: NAD, conversant  HEENT: Sclera anicteric, PERRLA, EOMI  Neck: Positive JVD up to angle of jaw, no carotid bruits  CV: RRR, no murmur, normal S1/S2, +S3, No Pericardial rub  Pulm: CTAB with no wheezes, rales, or rhonchi  GI: Abdomen soft, NTND, +BS  Extremities: No LE edema, warm and well perfused, No cyanosis, No clubbing, left groin IABP in place w/o evidence of hematoma  Skin: No ecchymosis, erythema, or ulcers  Psych: AOx3, appropriate affect  Neuro: CNII-XII intact, no  focal deficits    Significant Labs:  CBC:    Recent Labs  Lab 07/20/17  0259   WBC 5.62   RBC 4.53*   HGB 12.8*   HCT 38.4*      MCV 85   MCH 28.3   MCHC 33.3     BNP:    Recent Labs  Lab 07/18/17  1138   BNP 2,787*     CMP:    Recent Labs  Lab 07/20/17  0259      CALCIUM 9.3   ALBUMIN 3.2*   PROT 7.2   *   K 3.7   CO2 29   CL 93*   BUN 20   CREATININE 1.7*   ALKPHOS 61   ALT 10   AST 15   BILITOT 1.6*      Coagulation:     Recent Labs  Lab 07/18/17  1848  07/20/17  0259   INR  --   < > 1.2   APTT 21.7  --   --    < > = values in this interval not displayed.  LDH:  No results for input(s): LDH in the last 72 hours.  Microbiology:  Microbiology Results (last 7 days)     ** No results found for the last 168 hours. **          I have reviewed all pertinent labs within the past 24 hours.    Estimated Creatinine Clearance: 40.8 mL/min (based on Cr of 1.7).    Diagnostic Results:  I have reviewed and interpreted all pertinent imaging results/findings within the past 24 hours.

## 2017-07-20 NOTE — PROGRESS NOTES
Dr. Payton made aware about pts recent labs Mg 1.9, Na 129, and K 3.7.  Administering K 40 meq PO x 1 now per VO. Mg 400 mg ordered to be given BID from now on starting at 2100 on 7/20 per VO. No new orders regarding pts Na, Dr. Payton okay with monitoring.

## 2017-07-20 NOTE — PROGRESS NOTES
Admit Note     Met with patient to assess needs. Patient is a 67 y.o.  male, admitted Syncope and collapse [R55],   CHF (NYHA class IV, ACC/AHA stage D) [I50.9], AICD discharge [Z45.02], and Other cardiomyopathies [I42.8], per medical record. Pt worked up for LVAD/transplant on last admit, and has scheduled LVAD placement date next week (7/27/17).    Patient admitted from home on 7/18/2017.  At this time, patient presents as alert and oriented x 4, pleasant, good eye contact, calm, communicative, cooperative and asking and answering questions appropriately.  At this time, patients caregiver is not present.     Household/Family Systems     Patient resides with patient's with spouse and adult son at:     [address] [place] [id number]      Support system includes spouse, adult children, extended family, and many friends.   Patient does not have dependents that are need of being cared for.     Patients primary caregiver is Kia Catracho, patients spouse, phone number [phone].   Pt's home phone:  [phone]  Pt's cell phone:  [phone]  Emergency contacts:  Boni Hayden ( 35 yr old son, lives with parents and works at [employer]) [phone]  Rayna ( 44 yr old daughter,lives in [place] and works for the state) [phone]  Artie Hayden (nephew, lives in [place]) [phone]    During admission, patient's caregiver plans to stay in patient's room.  Confirmed patient and patients caregivers do have access to reliable transportation.    Cognitive Status/Learning     Patient reports reading ability as 9th grade and states patient does not have difficulty with seeing, hearing, comprehension, learning and memory. Pt states pt has issues with reading and writing. Pt reports wife assists with reading and writing. Patient reports patient learns best by one on one.   Needed: No.   Highest education level: High School (9-12) or GED    Vocation/Disability     Working for Income: No  If no,  reason not working: Patient Choice - Retired    Patient is retired from [employer] in .  The pt was a .    The pt's spouse was working as a PCA until 2017.  Pt's spouse is not longer able to work due to needing to care for pt.     Adherence     Patient reports a high level of adherence to patients health care regimen. Adherence counseling and education provided. Patient verbalizes understanding.    Substance Use    Patient reports the following substance usage.    Tobacco: none, patient denies any use.  Alcohol: none, patient denies any use.  Illicit Drugs/Non-prescribed Medications: none, patient denies any use.  Patient states clear understanding of the potential impact of substance use.  Substance abstinence/cessation counseling, education and resources provided and reviewed.     Services Utilizing/ADLS    Infusion Service: Prior to admission, patient utilizing? yes. Pt current with [facility].     Home Health: Prior to admission, patient utilizing? yes. Pt current with [facility]. ([phone]). SW spoke to ,MELISSA, at [facility] and notified of pt's admit and plan for LVAD surgery prior to D/C.  DME: Prior to admission, no  Pulmonary/Cardiac Rehab: Prior to admission, no  Dialysis:  Prior to admission, no  Transplant Specialty Pharmacy:  Prior to admission, no.    Prior to admission, patient reports patient was independent with ADLS and was driving. Pt's spouse also drives.  No reported issues with driving to [facility].  Patient reports patient is not able to care for self at this time due to compromised medical condition (as documented in medical record) and physical weakness.  Patient indicates a willingness to care for self once medically cleared to do so.    Insurance/Medications    Insured by   Payor/Plan Subscr  Sex Relation Sub. Ins. ID Effective Group Num   1. MEDICARE - [place]* MIRTA LOPEZ 1950 Male  [id number] 6/1/15                                     [address]   2. [facility]* MIRTA LOPEZ 1950 Male  [id number] 1/[birth year] [id number]                                   [address] [address]      Primary Insurance (for UNOS reporting): Public Insurance - Medicare FFS (Fee For Service)  Secondary Insurance (for UNOS reporting): Private Insurance    Patient reports patient is able to obtain and afford medications at this time and at time of discharge.     Living Will/Healthcare Power of     Patient states patient does not have a LW and/or HCPA.  Pt reports trusting his wife and children to make medical decisions.  provided education regarding LW and HCPA and the completion of forms.    Coping/Mental Health    Patient is coping well with the aid of  family members, friends and kayli. Pt and his spouse are members of the [facility].  Patient denies mental health difficulties.     Discharge Planning    At time of discharge, patient plans to return to patient's home under the care of spouse.  Patients spouse will transport patient.  Per rounds today, expected discharge date has not been medically determined at this time. Patient and patients caregiver  verbalize understanding and are involved in treatment planning and discharge process.    Additional Concerns    Patient is being followed for needs, education, resources, information, emotional support, supportive counseling, and for supportive and skilled discharge plan of care.  providing ongoing psychosocial support, education, resources and d/c planning as needed.  SW remains available. Patient's caregiver verbalizes understanding and agreement with information reviewed,  availability and how to access available resources as needed. Patient verbalizes understanding and agreement with information reviewed, social work availability, and how to access available resources as needed.

## 2017-07-20 NOTE — PLAN OF CARE
Problem: Patient Care Overview  Goal: Plan of Care Review  Outcome: Ongoing (interventions implemented as appropriate)  POC reviewed with pt and spouse.  No acute events throughout shift. IABP still 1:1 and pt tolerating well. Pulses 2+ upper/1+ lower. Dobu/Lasix/Heparin gtt continued.  UO > 175 every hour.  Mg and K replacement throughout shift as per VO from Dr. Payton. PICC line discontinued. Central line dressing changed. Pt scheduled to receive Heart Mate 3 LVAD 7/25. All questions/concerns addressed. VSS. WCTM.

## 2017-07-20 NOTE — PLAN OF CARE
Problem: Patient Care Overview  Goal: Plan of Care Review  Outcome: Ongoing (interventions implemented as appropriate)  VSS within the shift. No untoward signs and symptoms reported. Conscious and coherent. Tolerates room air. IABP in place with the following settings: EKG trigger, 1:1, Auto. Pulses are palpable on both upper and lower extremities. CVP was noted to be 11. Heparin infusion continued. Anti X10 monitoring facilitated. Lasix and dobutamine drip maintained at received regulation. POC discussed with patient and wife. Will continue to monitor.

## 2017-07-20 NOTE — PROGRESS NOTES
Date Consent signed: [id number]    Sponsor: [facility]    Study Title/IRB Number: [facility] [id number]    Principle Investigator: Dr. Demetrio Renee    Present for Discussion: Edwige Will, Suman Catracho, Mrs. Haydne    Prior to the Informed Consent (IC) being signed, or any study protocol required testing, procedure, or intervention being performed, the following was done or discussed:   Patient was given a copy of the IC for review    Purpose of the study    Follow up schedule and tests or procedures done at each follow up    Confidentiality and HIPAA Authorization for Release of Medical Records for the research trial/ subject's rights/research related injury   Risk, Benefits, Costs and Alternatives to the study   Participation in the research trial is voluntary and patient may withdraw at anytime   Contact information for study related questions    Patient verbalizes understanding of the above: Yes  Patients cardiologist Dr. Torres notified patient signed consent form for study: Yes  Contact information for CRC and PI given to patient: Yes  Patient able to adequately summarize: the purpose of the study, the risks associated with the study, and all procedures, testing, and follow-ups associated with the study    Mr. Hayden signed the informed consent form for the [facility] research study with an IRB approval date of 5/16/2017.  Each page of the consent form was reviewed with the patient (and pts family) and all questions answered satisfactorily. Mr. Hayden signed the consent form and received a copy of same. The original consent was scanned into electronic medical records (EPIC) and filed into the subject's research study binder.

## 2017-07-20 NOTE — PROGRESS NOTES
Brief EP Note:    Did well overnight. Tele reviewed, no events, remains in paced rhythm, no VT.    1. VT s/p ICD shock  2. Atrial tachycardia  -Can transition to amiodarone 400 bid then 400 daily thereafter  -On heparin gtt for anticoagulation given atrial tachyarrhytmia, high CVA risk. Would continue anticoagulation. As stated previously, can consider more definitive arrhythmia strategies at a later date if recurrent/as he is stabilized from a CHF/VAD standpoint    Charbel Chavira MD  EP Service PGY4  [phone]

## 2017-07-20 NOTE — PROGRESS NOTES
[facility]  Heart Transplant  Progress Note    Patient Name: Suman Hayden  MRN: [id number]  Admission Date: 7/18/2017  Hospital Length of Stay: 2 days  Attending Physician: Angie Torres MD  Primary Care Provider: Joe Ernst MD  Principal Problem:Acute on chronic combined systolic and diastolic heart failure    Subjective:     Interval History: NAEON. Feels well and denies any current complaints. ID cleared the patient for LVAD. LVAD placement scheduled for next Tuesday.    Continuous Infusions:   DOBUTamine 5 mcg/kg/min (07/20/17 1200)    furosemide (LASIX) 5 mg/mL infusion (non-titrating) 20 mg/hr (07/20/17 1200)    heparin (porcine) in D5W 16.04 Units/kg/hr (07/20/17 1200)     Scheduled Meds:   amiodarone  400 mg Oral BID    famotidine  20 mg Oral Daily    polyethylene glycol  17 g Oral Daily    pravastatin  20 mg Oral QHS    senna  8.6 mg Oral Daily    sodium chloride 0.9%  3 mL Intravenous Q8H     PRN Meds:bisacodyl, ondansetron, pneumococcal vaccine    Review of patient's allergies indicates:  No Known Allergies  Objective:     Vital Signs (Most Recent):  Temp: 98.1 °F (36.7 °C) (07/20/17 1100)  Pulse: 60 (07/20/17 1300)  Resp: (!) 21 (07/20/17 1300)  BP: (!) 100/57 (07/20/17 1300)  SpO2: 98 % (07/20/17 1300) Vital Signs (24h Range):  Temp:  [97.8 °F (36.6 °C)-98.7 °F (37.1 °C)] 98.1 °F (36.7 °C)  Pulse:  [59-60] 60  Resp:  [8-27] 21  SpO2:  [94 %-100 %] 98 %  BP: ()/(50-85) 100/57     Weight: 79.8 kg (175 lb 14.8 oz)  Body mass index is 26.75 kg/m².      Intake/Output Summary (Last 24 hours) at 07/20/17 1327  Last data filed at 07/20/17 1300   Gross per 24 hour   Intake          1490.83 ml   Output             3865 ml   Net         -2374.17 ml       Hemodynamic Parameters:  CVP 6, SVO2 64%  CO 3.71  CI 1.9  SVR 1488    Telemetry: no events    Physical Exam   Constitutional: NAD, conversant  HEENT: Sclera anicteric, PERRLA, EOMI  Neck: Positive JVD up to angle of jaw, no  carotid bruits  CV: RRR, no murmur, normal S1/S2, +S3, No Pericardial rub  Pulm: CTAB with no wheezes, rales, or rhonchi  GI: Abdomen soft, NTND, +BS  Extremities: No LE edema, warm and well perfused, No cyanosis, No clubbing, left groin IABP in place w/o evidence of hematoma  Skin: No ecchymosis, erythema, or ulcers  Psych: AOx3, appropriate affect  Neuro: CNII-XII intact, no focal deficits    Significant Labs:  CBC:    Recent Labs  Lab 07/20/17  0259   WBC 5.62   RBC 4.53*   HGB 12.8*   HCT 38.4*      MCV 85   MCH 28.3   MCHC 33.3     BNP:    Recent Labs  Lab 07/18/17  1138   BNP 2,787*     CMP:    Recent Labs  Lab 07/20/17  0259      CALCIUM 9.3   ALBUMIN 3.2*   PROT 7.2   *   K 3.7   CO2 29   CL 93*   BUN 20   CREATININE 1.7*   ALKPHOS 61   ALT 10   AST 15   BILITOT 1.6*      Coagulation:     Recent Labs  Lab 07/18/17  1848  07/20/17  0259   INR  --   < > 1.2   APTT 21.7  --   --    < > = values in this interval not displayed.  LDH:  No results for input(s): LDH in the last 72 hours.  Microbiology:  Microbiology Results (last 7 days)     ** No results found for the last 168 hours. **          I have reviewed all pertinent labs within the past 24 hours.    Estimated Creatinine Clearance: 40.8 mL/min (based on Cr of 1.7).    Diagnostic Results:  I have reviewed and interpreted all pertinent imaging results/findings within the past 24 hours.    Assessment and Plan:   67 y.o. gentleman with PMH of NICM (EF 10%) on home  at 5 mcg/kg/min, esophagitis, HTN, HLD, s/p Medtr[employer] CRT-D who presented to the hospital after undergoing a 6MWT and developed syncope followed by ICD shocks x 2. Admitted for ADHF/VT s/p ICD shocks. Scheduled for LVAD on Tuesday.       * Acute on chronic combined systolic and diastolic heart failure    - c/w  at 5 mcg/kg/min  - c/w lasix 20 mg/hr  - IABP 1:1 support with diastolic augmentation 100s  - CXR reviewed which reveals stable positioning  - CVP 6, SVO2 64%  - Net  negative 1.9L in the last 24 hours  - ID cleared the patient for LVAD sx  - LVAD scheduled for Tuesday 7/25  - PT/OT  - TTE tomorrow (RV dedicated)        ICD (implantable cardioverter-defibrillator) discharge    - appropriate in the setting of VT aggravated by underlying AT/AFL        V-tach    - amiodarone 400 mg PO BID until 8/2/17 then 400 mg QD after.  - monitor lytes; keep K>4.0, Mg>2.0  - appreciate EP recs        Atrial tachycardia    - [id number] - 3  - c/w amiodarone  - c/w heparin gtt        INDIRA (acute kidney injury)    - pre-renal due to hypervolemia  - c/w lasix gtt  - improving  - avoid nephrotoxic agents        Hepatitis B core antibody positive since 2012    - will defer liver biopsy as CTS not requiring it  - no evidence of liver failure  - TBILI trending down  - AST/ALT WNL  - case discussed with hepatology, low suspicion for liver involvement        Hyperlipidemia    - c/w pravastatin          Case discussed with attending. LVAD date 7/25, Tuesday.    Lorena Payton MD  Heart Transplant  [facility]

## 2017-07-20 NOTE — PROGRESS NOTES
Date Consent signed: [id number]    Sponsor: [employer]    Study Title/IRB Number: [employer] CAP [id number]    Principle Investigator: Dr. Sunny Downing    Present for Discussion: Edwige Will, Suman Catracho, Mrs. Hayden    Prior to the Informed Consent (IC) being signed, or any study protocol required testing, procedure, or intervention being performed, the following was done or discussed:   Patient was given a copy of the IC for review    Purpose of the study    Follow up schedule and tests or procedures done at each follow up    Confidentiality and HIPAA Authorization for Release of Medical Records for the research trial/ subject's rights/research related injury   Risk, Benefits, Costs and Alternatives to the study   Participation in the research trial is voluntary and patient may withdraw at anytime   Contact information for study related questions    Patient verbalizes understanding of the above: Yes  Patients cardiologist Dr. Torres notified patient signed consent form for study: Yes  Contact information for CRC and PI given to patient: Yes  Patient able to adequately summarize: the purpose of the study, the risks associated with the study, and all procedures, testing, and follow-ups associated with the study    Mr. Hayden signed the informed consent form for the [facility] research study with an IRB approval date of 5/8/2017.  Each page of the consent form was reviewed with the patient (and pts family) and all questions answered satisfactorily. Mr. Hayden signed the consent form and received a copy of same. The original consent was scanned into electronic medical records (EPIC) and filed into the subject's research study binder. Patient was enrolled into the AVWS and Biobank Substudies for [facility].

## 2017-07-20 NOTE — PROGRESS NOTES
PICC line was placed on previous admit (7/6) for purposes of home dobutamine for the pt.  Upon current admit, PICC was found to be displaced by approx 1/2-1 cm.  Night RN said that PICC has not been used due to displacement. Dr. Torres rounded today and ordered for PICC to be pulled by VO. PICC d/c'ed at 1115 on 7/20.

## 2017-07-21 LAB
ALBUMIN SERPL BCP-MCNC: 3.2 G/DL
ALLENS TEST: ABNORMAL
ALP SERPL-CCNC: 60 U/L
ALT SERPL W/O P-5'-P-CCNC: 9 U/L
ANION GAP SERPL CALC-SCNC: 11 MMOL/L
ANION GAP SERPL CALC-SCNC: 11 MMOL/L
ANION GAP SERPL CALC-SCNC: 12 MMOL/L
AST SERPL-CCNC: 15 U/L
BASOPHILS # BLD AUTO: 0.05 K/UL
BASOPHILS NFR BLD: 0.8 %
BILIRUB SERPL-MCNC: 1.8 MG/DL
BNP SERPL-MCNC: 912 PG/ML
BUN SERPL-MCNC: 18 MG/DL
BUN SERPL-MCNC: 18 MG/DL
BUN SERPL-MCNC: 21 MG/DL
CALCIUM SERPL-MCNC: 9.4 MG/DL
CALCIUM SERPL-MCNC: 9.5 MG/DL
CALCIUM SERPL-MCNC: 9.8 MG/DL
CHLORIDE SERPL-SCNC: 90 MMOL/L
CHLORIDE SERPL-SCNC: 91 MMOL/L
CHLORIDE SERPL-SCNC: 91 MMOL/L
CO2 SERPL-SCNC: 28 MMOL/L
CO2 SERPL-SCNC: 31 MMOL/L
CO2 SERPL-SCNC: 31 MMOL/L
CREAT SERPL-MCNC: 1.5 MG/DL
CREAT SERPL-MCNC: 1.6 MG/DL
CREAT SERPL-MCNC: 1.6 MG/DL
DIASTOLIC DYSFUNCTION: YES
DIFFERENTIAL METHOD: ABNORMAL
EOSINOPHIL # BLD AUTO: 0.3 K/UL
EOSINOPHIL NFR BLD: 4.3 %
ERYTHROCYTE [DISTWIDTH] IN BLOOD BY AUTOMATED COUNT: 15.5 %
EST. GFR  (AFRICAN AMERICAN): 50.8 ML/MIN/1.73 M^2
EST. GFR  (AFRICAN AMERICAN): 50.8 ML/MIN/1.73 M^2
EST. GFR  (AFRICAN AMERICAN): 54.9 ML/MIN/1.73 M^2
EST. GFR  (NON AFRICAN AMERICAN): 43.9 ML/MIN/1.73 M^2
EST. GFR  (NON AFRICAN AMERICAN): 43.9 ML/MIN/1.73 M^2
EST. GFR  (NON AFRICAN AMERICAN): 47.5 ML/MIN/1.73 M^2
ESTIMATED PA SYSTOLIC PRESSURE: 44.47
FACT X PPP CHRO-ACNC: 0.61 IU/ML
GLUCOSE SERPL-MCNC: 73 MG/DL
GLUCOSE SERPL-MCNC: 74 MG/DL
GLUCOSE SERPL-MCNC: 90 MG/DL
HCO3 UR-SCNC: 32.2 MMOL/L (ref 24–28)
HCT VFR BLD AUTO: 39.3 %
HGB BLD-MCNC: 13.3 G/DL
INR PPP: 1.3
LYMPHOCYTES # BLD AUTO: 1.7 K/UL
LYMPHOCYTES NFR BLD: 28.2 %
MAGNESIUM SERPL-MCNC: 1.9 MG/DL
MAGNESIUM SERPL-MCNC: 2.3 MG/DL
MCH RBC QN AUTO: 28.2 PG
MCHC RBC AUTO-ENTMCNC: 33.8 G/DL
MCV RBC AUTO: 83 FL
MITRAL VALVE REGURGITATION: ABNORMAL
MONOCYTES # BLD AUTO: 0.8 K/UL
MONOCYTES NFR BLD: 13.1 %
NEUTROPHILS # BLD AUTO: 3.2 K/UL
NEUTROPHILS NFR BLD: 53.3 %
PCO2 BLDA: 46.4 MMHG (ref 35–45)
PH SMN: 7.45 [PH] (ref 7.35–7.45)
PHOSPHATE SERPL-MCNC: 3.7 MG/DL
PLATELET # BLD AUTO: 187 K/UL
PMV BLD AUTO: 10.7 FL
PO2 BLDA: 33 MMHG (ref 40–60)
POC BE: 8 MMOL/L
POC SATURATED O2: 65 % (ref 95–100)
POC TCO2: 34 MMOL/L (ref 24–29)
POTASSIUM SERPL-SCNC: 3.8 MMOL/L
POTASSIUM SERPL-SCNC: 3.8 MMOL/L
POTASSIUM SERPL-SCNC: 4.1 MMOL/L
PROT SERPL-MCNC: 7.3 G/DL
PROTHROMBIN TIME: 13.6 SEC
RBC # BLD AUTO: 4.71 M/UL
RETIRED EF AND QEF - SEE NOTES: 15 (ref 55–65)
SAMPLE: ABNORMAL
SITE: ABNORMAL
SODIUM SERPL-SCNC: 130 MMOL/L
SODIUM SERPL-SCNC: 133 MMOL/L
SODIUM SERPL-SCNC: 133 MMOL/L
WBC # BLD AUTO: 6.03 K/UL

## 2017-07-21 PROCEDURE — 63600175 PHARM REV CODE 636 W HCPCS: Performed by: STUDENT IN AN ORGANIZED HEALTH CARE EDUCATION/TRAINING PROGRAM

## 2017-07-21 PROCEDURE — 25000003 PHARM REV CODE 250: Performed by: INTERNAL MEDICINE

## 2017-07-21 PROCEDURE — 80053 COMPREHEN METABOLIC PANEL: CPT

## 2017-07-21 PROCEDURE — 84100 ASSAY OF PHOSPHORUS: CPT

## 2017-07-21 PROCEDURE — 85520 HEPARIN ASSAY: CPT

## 2017-07-21 PROCEDURE — 63600175 PHARM REV CODE 636 W HCPCS: Performed by: INTERNAL MEDICINE

## 2017-07-21 PROCEDURE — 97110 THERAPEUTIC EXERCISES: CPT

## 2017-07-21 PROCEDURE — 85610 PROTHROMBIN TIME: CPT

## 2017-07-21 PROCEDURE — 99900035 HC TECH TIME PER 15 MIN (STAT)

## 2017-07-21 PROCEDURE — 83880 ASSAY OF NATRIURETIC PEPTIDE: CPT

## 2017-07-21 PROCEDURE — 20000000 HC ICU ROOM

## 2017-07-21 PROCEDURE — 93306 TTE W/DOPPLER COMPLETE: CPT

## 2017-07-21 PROCEDURE — 83735 ASSAY OF MAGNESIUM: CPT | Mod: 91

## 2017-07-21 PROCEDURE — 80048 BASIC METABOLIC PNL TOTAL CA: CPT

## 2017-07-21 PROCEDURE — 93306 TTE W/DOPPLER COMPLETE: CPT | Mod: 26,,, | Performed by: INTERNAL MEDICINE

## 2017-07-21 PROCEDURE — 85025 COMPLETE CBC W/AUTO DIFF WBC: CPT

## 2017-07-21 PROCEDURE — 25000003 PHARM REV CODE 250: Performed by: STUDENT IN AN ORGANIZED HEALTH CARE EDUCATION/TRAINING PROGRAM

## 2017-07-21 PROCEDURE — 82803 BLOOD GASES ANY COMBINATION: CPT

## 2017-07-21 PROCEDURE — 27000202 HC IABP, ADD'L DAY

## 2017-07-21 PROCEDURE — 99233 SBSQ HOSP IP/OBS HIGH 50: CPT | Mod: ,,, | Performed by: INTERNAL MEDICINE

## 2017-07-21 PROCEDURE — 80048 BASIC METABOLIC PNL TOTAL CA: CPT | Mod: 91

## 2017-07-21 PROCEDURE — 97167 OT EVAL HIGH COMPLEX 60 MIN: CPT

## 2017-07-21 PROCEDURE — 83735 ASSAY OF MAGNESIUM: CPT

## 2017-07-21 RX ORDER — LANOLIN ALCOHOL/MO/W.PET/CERES
400 CREAM (GRAM) TOPICAL ONCE
Status: COMPLETED | OUTPATIENT
Start: 2017-07-21 | End: 2017-07-21

## 2017-07-21 RX ORDER — POTASSIUM CHLORIDE 20 MEQ/15ML
40 SOLUTION ORAL ONCE
Status: COMPLETED | OUTPATIENT
Start: 2017-07-21 | End: 2017-07-21

## 2017-07-21 RX ORDER — POTASSIUM CHLORIDE 20 MEQ/15ML
40 SOLUTION ORAL ONCE
Status: DISCONTINUED | OUTPATIENT
Start: 2017-07-21 | End: 2017-07-21

## 2017-07-21 RX ADMIN — AMIODARONE HYDROCHLORIDE 400 MG: 200 TABLET ORAL at 08:07

## 2017-07-21 RX ADMIN — FAMOTIDINE 20 MG: 20 TABLET, FILM COATED ORAL at 09:07

## 2017-07-21 RX ADMIN — SENNOSIDES 8.6 MG: 8.6 TABLET, FILM COATED ORAL at 09:07

## 2017-07-21 RX ADMIN — MAGNESIUM OXIDE TAB 400 MG (241.3 MG ELEMENTAL MG) 400 MG: 400 (241.3 MG) TAB at 06:07

## 2017-07-21 RX ADMIN — HEPARIN SODIUM AND DEXTROSE 16 UNITS/KG/HR: 10000; 5 INJECTION INTRAVENOUS at 04:07

## 2017-07-21 RX ADMIN — AMIODARONE HYDROCHLORIDE 400 MG: 200 TABLET ORAL at 09:07

## 2017-07-21 RX ADMIN — MAGNESIUM OXIDE TAB 400 MG (241.3 MG ELEMENTAL MG) 400 MG: 400 (241.3 MG) TAB at 08:07

## 2017-07-21 RX ADMIN — DOBUTAMINE HYDROCHLORIDE 5 MCG/KG/MIN: 400 INJECTION INTRAVENOUS at 02:07

## 2017-07-21 RX ADMIN — SODIUM CHLORIDE, PRESERVATIVE FREE 3 ML: 5 INJECTION INTRAVENOUS at 02:07

## 2017-07-21 RX ADMIN — POTASSIUM CHLORIDE 40 MEQ: 20 SOLUTION ORAL at 06:07

## 2017-07-21 RX ADMIN — PRAVASTATIN SODIUM 20 MG: 20 TABLET ORAL at 08:07

## 2017-07-21 RX ADMIN — MAGNESIUM OXIDE TAB 400 MG (241.3 MG ELEMENTAL MG) 400 MG: 400 (241.3 MG) TAB at 09:07

## 2017-07-21 RX ADMIN — SODIUM CHLORIDE, PRESERVATIVE FREE 3 ML: 5 INJECTION INTRAVENOUS at 10:07

## 2017-07-21 RX ADMIN — FUROSEMIDE 20 MG/HR: 10 INJECTION, SOLUTION INTRAVENOUS at 03:07

## 2017-07-21 RX ADMIN — SODIUM CHLORIDE, PRESERVATIVE FREE 3 ML: 5 INJECTION INTRAVENOUS at 05:07

## 2017-07-21 NOTE — PLAN OF CARE
Problem: Patient Care Overview  Goal: Plan of Care Review  Outcome: Ongoing (interventions implemented as appropriate)  Pt remains afebrile and VSS. See flow sheet for full assessment. Pt has L femoral IABP and remains with HOB below 15 degrees and in immobilization device on leg. Pt remains on continuous tele and pulse ox monitor. No falls. No complaints of pain or nausea. Pt remains on dobutamine, furosemide, & heparin gtts.  POC reviewed w/ pt & spouse who verbalized understanding.

## 2017-07-21 NOTE — PROGRESS NOTES
[facility]  Heart Transplant  Progress Note    Patient Name: Suman Hayden  MRN: [id number]  Admission Date: 7/18/2017  Hospital Length of Stay: 3 days  Attending Physician: Angie Torres MD  Primary Care Provider: Joe Ernst MD  Principal Problem:Acute on chronic combined systolic and diastolic heart failure    Subjective:     Interval History: NAOEN. Feels well and denies any major complaints. Underwent TTE today.    Continuous Infusions:   DOBUTamine 5 mcg/kg/min (07/21/17 1200)    furosemide (LASIX) 5 mg/mL infusion (non-titrating) 20 mg/hr (07/21/17 1200)    heparin (porcine) in D5W 16.04 Units/kg/hr (07/21/17 1200)     Scheduled Meds:   amiodarone  400 mg Oral BID    famotidine  20 mg Oral Daily    magnesium oxide  400 mg Oral BID    polyethylene glycol  17 g Oral Daily    pravastatin  20 mg Oral QHS    senna  8.6 mg Oral Daily    sodium chloride 0.9%  3 mL Intravenous Q8H     PRN Meds:bisacodyl, ondansetron, pneumococcal vaccine    Review of patient's allergies indicates:  No Known Allergies  Objective:     Vital Signs (Most Recent):  Temp: 97.8 °F (36.6 °C) (07/21/17 1100)  Pulse: (!) 59 (07/21/17 1200)  Resp: (!) 21 (07/21/17 1200)  BP: 131/77 (07/21/17 1200)  SpO2: 97 % (07/21/17 1200) Vital Signs (24h Range):  Temp:  [97.8 °F (36.6 °C)-98.7 °F (37.1 °C)] 97.8 °F (36.6 °C)  Pulse:  [59-60] 59  Resp:  [9-25] 21  SpO2:  [94 %-99 %] 97 %  BP: (100-146)/(55-85) 131/77     Weight: 79.8 kg (175 lb 14.8 oz)  Body mass index is 26.75 kg/m².      Intake/Output Summary (Last 24 hours) at 07/21/17 1239  Last data filed at 07/21/17 1200   Gross per 24 hour   Intake           1540.9 ml   Output             3825 ml   Net          -2284.1 ml       Hemodynamic Parameters:       Telemetry: no events    Physical Exam   Constitutional: NAD, conversant  HEENT: Sclera anicteric, PERRLA, EOMI  Neck: Positive JVD up to angle of jaw, no carotid bruits  CV: RRR, no murmur, normal S1/S2, +S3, No  Pericardial rub  Pulm: CTAB with no wheezes, rales, or rhonchi  GI: Abdomen soft, NTND, +BS  Extremities: No LE edema, warm and well perfused, No cyanosis, No clubbing, left groin IABP in place w/o evidence of hematoma  Skin: No ecchymosis, erythema, or ulcers  Psych: AOx3, appropriate affect  Neuro: CNII-XII intact, no focal deficits    Significant Labs:  CBC:    Recent Labs  Lab 07/21/17  0232   WBC 6.03   RBC 4.71   HGB 13.3*   HCT 39.3*      MCV 83   MCH 28.2   MCHC 33.8     BNP:    Recent Labs  Lab 07/21/17  0619   *     CMP:    Recent Labs  Lab 07/21/17  0233 07/21/17  0454   GLU 74 73   CALCIUM 9.4 9.5   ALBUMIN 3.2*  --    PROT 7.3  --    * 133*   K 3.8 3.8   CO2 31* 31*   CL 91* 91*   BUN 18 18   CREATININE 1.6* 1.5*   ALKPHOS 60  --    ALT 9*  --    AST 15  --    BILITOT 1.8*  --       Coagulation:     Recent Labs  Lab 07/18/17  1848  07/21/17  0232   INR  --   < > 1.3*   APTT 21.7  --   --    < > = values in this interval not displayed.  LDH:  No results for input(s): LDH in the last 72 hours.  Microbiology:  Microbiology Results (last 7 days)     ** No results found for the last 168 hours. **          I have reviewed all pertinent labs within the past 24 hours.    Estimated Creatinine Clearance: 46.2 mL/min (based on Cr of 1.5).    Diagnostic Results:  I have reviewed and interpreted all pertinent imaging results/findings within the past 24 hours.     TTE (7/21/17):  1 - Severe left ventricular enlargement.     2 - Severely depressed left ventricular systolic function (EF 15-20%).     3 - Right ventricular enlargement with moderately depressed systolic function.     4 - Restrictive LV filling pattern, indicating markedly elevated LAP (grade 3 diastolic dysfunction).     5 - Biatrial enlargement.     6 - Pulmonary hypertension. The estimated PA systolic pressure is 44 mmHg.     Assessment and Plan:     * Acute on chronic combined systolic and diastolic heart failure    - c/w  at 5  mcg/kg/min  - c/w lasix 20 mg/hr  - IABP 1:1 support with diastolic augmentation 140s  - CXR reviewed which reveals stable positioning  - D/c delgado catheter  - CVP 6, SVO2 65%  - Net negative 2.7L in the last 24 hours  - LVAD sx planned for Tuesday, 7/25/17          ICD (implantable cardioverter-defibrillator) discharge    - appropriate in the setting of VT aggravated by underlying AT/AFL        V-tach    - amiodarone 400 mg PO BID until 8/2/17 the 400 mg QD  - monitor lytes; keep K>4.0, Mg>2.0  - appreciate EP recs        Atrial tachycardia    - [id number] - 3  - c/w amiodarone  - c/w heparin gtt        INDIRA (acute kidney injury)    - pre-renal due to hypervolemia  - c/w lasix gtt  - improving  - avoid nephrotoxic agents        Hepatitis B core antibody positive since 2012    - will defer liver biopsy as CTS not requiring it  - ID consult in place for clearance for VAD sx  - no evidence of liver failure  - TBILI trending down  - AST/ALT WNL  - case discussed with hepatology, low suspicion for liver involvement        Hyperlipidemia    - c/w pravastatin          Case discussed with attending.     Lorena Payton MD  Heart Transplant  [facility]

## 2017-07-21 NOTE — ASSESSMENT & PLAN NOTE
- amiodarone 400 mg PO BID until 8/2/17 the 400 mg QD  - monitor lytes; keep K>4.0, Mg>2.0  - appreciate EP recs

## 2017-07-21 NOTE — PLAN OF CARE
Problem: Patient Care Overview  Goal: Plan of Care Review  Outcome: Ongoing (interventions implemented as appropriate)  VSS within the shift. Conscious and coherent. On paced rhythm. With IABP support. Settings maintained at 1:1, EKG trigger, Auto. CVP noted at 5. No signs of bleeding observed on left femoral puncture site. For VAD surgery next week. IDS clearance obtained. Awaiting for work up orders. Tolerates room air. Voiding freely. Heparin drip continued  and titrated accordingly. Anti Xa monitoring facilitated. Lasix and dobutamine infusions maintained at received regulation. POC discussed with patient and spouse. Will continue to monitor.

## 2017-07-21 NOTE — PLAN OF CARE
Problem: Occupational Therapy Goal  Goal: Occupational Therapy Goal  Goals to be met by: 8/04/2017    Patient will increase functional independence with ADLs by performing:    Increased functional strength to 5/5 for improved ADL performance.  Upper extremity exercise program and R LE ankle pumps  3x 10 reps per handout, with independence.    Outcome: Ongoing (interventions implemented as appropriate)  OT evaluation and Plan of care established 7/21/17

## 2017-07-21 NOTE — ASSESSMENT & PLAN NOTE
- c/w  at 5 mcg/kg/min  - c/w lasix 20 mg/hr  - IABP 1:1 support with diastolic augmentation 140s  - CXR reviewed which reveals stable positioning  - D/c delgado catheter  - CVP 6, SVO2 65%  - Net negative 2.7L in the last 24 hours  - LVAD sx planned for Tuesday, 7/25/17

## 2017-07-21 NOTE — PROGRESS NOTES
Met with patient today. Collected EMS form and gave HM3 handbook. VAD date Tuesday. Patient ready for VAD no questions at this time.

## 2017-07-21 NOTE — PROGRESS NOTES
SVO2 resulted. MD Payton contacted and ersults relayed. AM labs reviewed. MD updated to AM electrolyte replacement. Orders received.

## 2017-07-21 NOTE — SUBJECTIVE & OBJECTIVE
Interval History: NAOEN. Feels well and denies any major complaints. Underwent TTE today.    Continuous Infusions:   DOBUTamine 5 mcg/kg/min (07/21/17 1200)    furosemide (LASIX) 5 mg/mL infusion (non-titrating) 20 mg/hr (07/21/17 1200)    heparin (porcine) in D5W 16.04 Units/kg/hr (07/21/17 1200)     Scheduled Meds:   amiodarone  400 mg Oral BID    famotidine  20 mg Oral Daily    magnesium oxide  400 mg Oral BID    polyethylene glycol  17 g Oral Daily    pravastatin  20 mg Oral QHS    senna  8.6 mg Oral Daily    sodium chloride 0.9%  3 mL Intravenous Q8H     PRN Meds:bisacodyl, ondansetron, pneumococcal vaccine    Review of patient's allergies indicates:  No Known Allergies  Objective:     Vital Signs (Most Recent):  Temp: 97.8 °F (36.6 °C) (07/21/17 1100)  Pulse: (!) 59 (07/21/17 1200)  Resp: (!) 21 (07/21/17 1200)  BP: 131/77 (07/21/17 1200)  SpO2: 97 % (07/21/17 1200) Vital Signs (24h Range):  Temp:  [97.8 °F (36.6 °C)-98.7 °F (37.1 °C)] 97.8 °F (36.6 °C)  Pulse:  [59-60] 59  Resp:  [9-25] 21  SpO2:  [94 %-99 %] 97 %  BP: (100-146)/(55-85) 131/77     Weight: 79.8 kg (175 lb 14.8 oz)  Body mass index is 26.75 kg/m².      Intake/Output Summary (Last 24 hours) at 07/21/17 1239  Last data filed at 07/21/17 1200   Gross per 24 hour   Intake           1540.9 ml   Output             3825 ml   Net          -2284.1 ml       Hemodynamic Parameters:       Telemetry: no events    Physical Exam   Constitutional: NAD, conversant  HEENT: Sclera anicteric, PERRLA, EOMI  Neck: Positive JVD up to angle of jaw, no carotid bruits  CV: RRR, no murmur, normal S1/S2, +S3, No Pericardial rub  Pulm: CTAB with no wheezes, rales, or rhonchi  GI: Abdomen soft, NTND, +BS  Extremities: No LE edema, warm and well perfused, No cyanosis, No clubbing, left groin IABP in place w/o evidence of hematoma  Skin: No ecchymosis, erythema, or ulcers  Psych: AOx3, appropriate affect  Neuro: CNII-XII intact, no focal deficits    Significant  Labs:  CBC:    Recent Labs  Lab 07/21/17  0232   WBC 6.03   RBC 4.71   HGB 13.3*   HCT 39.3*      MCV 83   MCH 28.2   MCHC 33.8     BNP:    Recent Labs  Lab 07/21/17  0619   *     CMP:    Recent Labs  Lab 07/21/17  0233 07/21/17  0454   GLU 74 73   CALCIUM 9.4 9.5   ALBUMIN 3.2*  --    PROT 7.3  --    * 133*   K 3.8 3.8   CO2 31* 31*   CL 91* 91*   BUN 18 18   CREATININE 1.6* 1.5*   ALKPHOS 60  --    ALT 9*  --    AST 15  --    BILITOT 1.8*  --       Coagulation:     Recent Labs  Lab 07/18/17  1848  07/21/17 0232   INR  --   < > 1.3*   APTT 21.7  --   --    < > = values in this interval not displayed.  LDH:  No results for input(s): LDH in the last 72 hours.  Microbiology:  Microbiology Results (last 7 days)     ** No results found for the last 168 hours. **          I have reviewed all pertinent labs within the past 24 hours.    Estimated Creatinine Clearance: 46.2 mL/min (based on Cr of 1.5).    Diagnostic Results:  I have reviewed and interpreted all pertinent imaging results/findings within the past 24 hours.     TTE (7/21/17):  1 - Severe left ventricular enlargement.     2 - Severely depressed left ventricular systolic function (EF 15-20%).     3 - Right ventricular enlargement with moderately depressed systolic function.     4 - Restrictive LV filling pattern, indicating markedly elevated LAP (grade 3 diastolic dysfunction).     5 - Biatrial enlargement.     6 - Pulmonary hypertension. The estimated PA systolic pressure is 44 mmHg.

## 2017-07-21 NOTE — PT/OT/SLP EVAL
"Occupational Therapy  Evaluation    Suman Hayden   MRN: [id number]   Admitting Diagnosis: Acute on chronic combined systolic and diastolic heart failure    OT Date of Treatment: 07/21/17   OT Start Time: 1110  OT Stop Time: 1148  OT Total Time (min): 38 min    Billable Minutes:  Evaluation 10  Therapeutic Exercise 28    Diagnosis: Acute on chronic combined systolic and diastolic heart failure     Past Medical History:   Diagnosis Date    Cardiomyopathy     Hyperlipidemia     Hypertension     Obesity     S/P implantation of automatic cardioverter/defibrillator (AICD)     Ventricular tachycardia       Past Surgical History:   Procedure Laterality Date    CARDIAC CATHETERIZATION      CARDIAC DEFIBRILLATOR PLACEMENT         Referring physician: Brian LINDSEY)   Date referred to OT: 7/20/17    General Precautions: Standard, IABP, fall     Patient History:  Living Environment  Lives With: spouse  Living Arrangements: house  Transportation Available: family or friend will provide  Living Environment Comment: Pt lives with spouse in a SS home, No steps to enter, No DME, Tub/ shower combo. independent with ADL and mobility prior to admission   Equipment Currently Used at Home: none    Prior level of function:   Bed Mobility/Transfers: independent  Grooming: independent  Bathing: independent  Upper Body Dressing: independent  Lower Body Dressing: independent  Toileting: independent  Home Management Skills: independent  Driving License: Yes  Mode of Transportation: Car  Occupation: Retired     Dominant hand: left    Subjective:  Communicated with RN prior to session, post session handoff discussed progress and plan of care   Chief Complaint: "weakness"- Pt agreeable to   Patient/Family stated goals: To gt stronger     Pain/Comfort  Pain Rating 1: 0/10    Objective:  Patient found reclined in bed with: arterial line, central line, blood pressure cuff, pressure relief boots, telemetry, peripheral IV, pulse ox (continuous) (left " femoral  IABP), L knee immobilizer  spouse present at bedside     Cognitive Exam:  Oriented to: Person, Place, Time and Situation  Follows Commands/attention: Follows two-step commands  Communication: clear/fluent  Memory:  No Deficits noted  Safety awareness/insight to disability: intact  Coping skills/emotional control: Appropriate to situation    Visual/perceptual:  Intact    Physical Exam:  Postural examination/scapula alignment: No postural abnormalities identified  Skin integrity: Visible skin intact  Edema: None noted     Sensation:   Intact  light/touch with localization    Upper Extremity Range of Motion:  Right Upper Extremity: WFL  Left Upper Extremity: WFL    Upper Extremity Strength:  Right Upper Extremity: 3-/5 throughout   Left Upper Extremity: 3-/5 throughout   Strength: Bilateral  3/5 throughout     Fine motor coordination:   Intact    Gross motor coordination: WFL    Functional Mobility:  Bed Mobility:  Not assessed this visit     Transfers:  Not assessed this visit     Activities of Daily Living:    UE Dressing Level of Assistance: Supervision (donfilippo gowgerard )    Balance:   Not assessed     Therapeutic Activities and Exercises:  Provision of BUE exercise program to be performed while in bed 3x10 3x a day for each exercises. Right ankle pump exercises 3x10 3 x/ day.  Pt educated on role of OT, plan of care, safety awareness, DME, importance of out of bed activity, home exercise program       AM-PAC 6 CLICK ADL  How much help from another person does this patient currently need?  1 = Unable, Total/Dependent Assistance  2 = A lot, Maximum/Moderate Assistance  3 = A little, Minimum/Contact Guard/Supervision  4 = None, Modified [place]/Independent    Putting on and taking off regular lower body clothing? : 2  Bathing (including washing, rinsing, drying)?: 2  Toileting, which includes using toilet, bedpan, or urinal? : 2  Putting on and taking off regular upper body clothing?: 3  Taking care  "of personal grooming such as brushing teeth?: 4  Eating meals?: 4  Total Score: 17    AM-PAC Raw Score CMS "G-Code Modifier Level of Impairment Assistance   6 % Total / Unable   7 - 9 CM 80 - 100% Maximal Assist   10-14 CL 60 - 80% Moderate Assist   15 - 19 CK 40 - 60% Moderate Assist   20 - 22 CJ 20 - 40% Minimal Assist   23 CI 1-20% SBA / CGA   24 CH 0% Independent/ Mod I       Patient left supine with all lines intact, call button in reach, RN notified and spouse present    Assessment:  Suman Hayden is a 67 y.o. male with a medical diagnosis of Acute on chronic combined systolic and diastolic heart failure and presents with decreased activity tolerance and generalized weakness impacting indep and safety with ADL/ Self care and functional mobility. Pt has a Left femoral IABP, LVAD HM 3 scheduled for 7/25. Pt provided with an Upper body exercise program and Right ankle pumps exercises. Pt will benefit from skilled OT services to maximize independence and safety with ADL/Self care and functional mobility         Rehab identified problem list/impairments: Rehab identified problem list/impairments: weakness, impaired self care skills, impaired functional mobilty, impaired balance, impaired cardiopulmonary response to activity    Rehab potential is good.    Activity tolerance: Fair     Discharge recommendations: Discharge Facility/Level Of Care Needs:  (TBD closer to discharge )     Barriers to discharge: none identified at this time will continue to assess     Equipment recommendations:  (TBD closer to discharge )     GOALS:    Occupational Therapy Goals        Problem: Occupational Therapy Goal    Goal Priority Disciplines Outcome Interventions   Occupational Therapy Goal     OT, PT/OT Ongoing (interventions implemented as appropriate)    Description:  Goals to be met by: 8/04/2017    Patient will increase functional independence with ADLs by performing:    Increased functional strength to 5/5 for improved ADL " performance.  Upper extremity exercise program and R LE ankle pumps  3x 10 reps per handout, with independence.                      PLAN:  Patient to be seen 2 x/week to address the above listed problems via self-care/home management, therapeutic activities, therapeutic exercises  Plan of Care expires: 08/04/17  Plan of Care reviewed with: patient, spouse         Beatriz Dubose, OT  07/21/2017

## 2017-07-21 NOTE — PT/OT/SLP PROGRESS
Physical Therapy      Suman Hayden  MRN: [id number]    Patient not seen today secondary to  (OT following for ROM). Will follow-up as appropriate.    Sada Vu, PT   7/21/2017

## 2017-07-22 LAB
ALBUMIN SERPL BCP-MCNC: 3.4 G/DL
ALLENS TEST: ABNORMAL
ALP SERPL-CCNC: 63 U/L
ALT SERPL W/O P-5'-P-CCNC: 10 U/L
AMIODARONE FLD-MCNC: 0.8 UG/ML (ref 1–3)
ANION GAP SERPL CALC-SCNC: 13 MMOL/L
ANION GAP SERPL CALC-SCNC: 13 MMOL/L
AST SERPL-CCNC: 17 U/L
BASOPHILS # BLD AUTO: 0.06 K/UL
BASOPHILS NFR BLD: 1.1 %
BILIRUB SERPL-MCNC: 1.9 MG/DL
BUN SERPL-MCNC: 21 MG/DL
BUN SERPL-MCNC: 21 MG/DL
CALCIUM SERPL-MCNC: 9.8 MG/DL
CALCIUM SERPL-MCNC: 9.8 MG/DL
CHLORIDE SERPL-SCNC: 90 MMOL/L
CHLORIDE SERPL-SCNC: 90 MMOL/L
CO2 SERPL-SCNC: 29 MMOL/L
CO2 SERPL-SCNC: 29 MMOL/L
CREAT SERPL-MCNC: 1.6 MG/DL
CREAT SERPL-MCNC: 1.6 MG/DL
DELSYS: ABNORMAL
DIFFERENTIAL METHOD: ABNORMAL
EOSINOPHIL # BLD AUTO: 0.3 K/UL
EOSINOPHIL NFR BLD: 4.5 %
ERYTHROCYTE [DISTWIDTH] IN BLOOD BY AUTOMATED COUNT: 15.4 %
ERYTHROCYTE [SEDIMENTATION RATE] IN BLOOD BY WESTERGREN METHOD: 14 MM/H
EST. GFR  (AFRICAN AMERICAN): 50.8 ML/MIN/1.73 M^2
EST. GFR  (AFRICAN AMERICAN): 50.8 ML/MIN/1.73 M^2
EST. GFR  (NON AFRICAN AMERICAN): 43.9 ML/MIN/1.73 M^2
EST. GFR  (NON AFRICAN AMERICAN): 43.9 ML/MIN/1.73 M^2
FACT X PPP CHRO-ACNC: 0.7 IU/ML
FIO2: 21
GLUCOSE SERPL-MCNC: 79 MG/DL
GLUCOSE SERPL-MCNC: 79 MG/DL
HCO3 UR-SCNC: 32.2 MMOL/L (ref 24–28)
HCT VFR BLD AUTO: 40.9 %
HGB BLD-MCNC: 14.1 G/DL
INR PPP: 1.2
LYMPHOCYTES # BLD AUTO: 1.6 K/UL
LYMPHOCYTES NFR BLD: 28.1 %
MAGNESIUM SERPL-MCNC: 2.4 MG/DL
MCH RBC QN AUTO: 28.8 PG
MCHC RBC AUTO-ENTMCNC: 34.5 G/DL
MCV RBC AUTO: 84 FL
MODE: ABNORMAL
MONOCYTES # BLD AUTO: 0.8 K/UL
MONOCYTES NFR BLD: 13.8 %
N-DESETHYL-AMIODARONE: 1.3 UG/ML (ref 1–3)
NEUTROPHILS # BLD AUTO: 2.9 K/UL
NEUTROPHILS NFR BLD: 52.3 %
PCO2 BLDA: 48.9 MMHG (ref 35–45)
PH SMN: 7.43 [PH] (ref 7.35–7.45)
PHOSPHATE SERPL-MCNC: 4 MG/DL
PLATELET # BLD AUTO: 191 K/UL
PMV BLD AUTO: 11.1 FL
PO2 BLDA: 36 MMHG (ref 40–60)
POC BE: 8 MMOL/L
POC SATURATED O2: 70 % (ref 95–100)
POC TCO2: 34 MMOL/L (ref 24–29)
POTASSIUM SERPL-SCNC: 3.8 MMOL/L
POTASSIUM SERPL-SCNC: 3.8 MMOL/L
PROT SERPL-MCNC: 7.7 G/DL
PROTHROMBIN TIME: 12.9 SEC
RBC # BLD AUTO: 4.9 M/UL
SAMPLE: ABNORMAL
SITE: ABNORMAL
SODIUM SERPL-SCNC: 132 MMOL/L
SODIUM SERPL-SCNC: 132 MMOL/L
SP02: 98
WBC # BLD AUTO: 5.52 K/UL

## 2017-07-22 PROCEDURE — 85610 PROTHROMBIN TIME: CPT

## 2017-07-22 PROCEDURE — 99900035 HC TECH TIME PER 15 MIN (STAT)

## 2017-07-22 PROCEDURE — 63600175 PHARM REV CODE 636 W HCPCS: Performed by: STUDENT IN AN ORGANIZED HEALTH CARE EDUCATION/TRAINING PROGRAM

## 2017-07-22 PROCEDURE — 63600175 PHARM REV CODE 636 W HCPCS: Performed by: INTERNAL MEDICINE

## 2017-07-22 PROCEDURE — 80053 COMPREHEN METABOLIC PANEL: CPT

## 2017-07-22 PROCEDURE — 25000003 PHARM REV CODE 250: Performed by: INTERNAL MEDICINE

## 2017-07-22 PROCEDURE — 25000003 PHARM REV CODE 250: Performed by: STUDENT IN AN ORGANIZED HEALTH CARE EDUCATION/TRAINING PROGRAM

## 2017-07-22 PROCEDURE — 99233 SBSQ HOSP IP/OBS HIGH 50: CPT | Mod: ,,, | Performed by: INTERNAL MEDICINE

## 2017-07-22 PROCEDURE — 84100 ASSAY OF PHOSPHORUS: CPT

## 2017-07-22 PROCEDURE — 85520 HEPARIN ASSAY: CPT

## 2017-07-22 PROCEDURE — 20000000 HC ICU ROOM

## 2017-07-22 PROCEDURE — 82803 BLOOD GASES ANY COMBINATION: CPT

## 2017-07-22 PROCEDURE — 27000202 HC IABP, ADD'L DAY

## 2017-07-22 PROCEDURE — 83735 ASSAY OF MAGNESIUM: CPT

## 2017-07-22 PROCEDURE — 85025 COMPLETE CBC W/AUTO DIFF WBC: CPT

## 2017-07-22 RX ORDER — POTASSIUM CHLORIDE 20 MEQ/15ML
40 SOLUTION ORAL ONCE
Status: COMPLETED | OUTPATIENT
Start: 2017-07-22 | End: 2017-07-22

## 2017-07-22 RX ADMIN — AMIODARONE HYDROCHLORIDE 400 MG: 200 TABLET ORAL at 09:07

## 2017-07-22 RX ADMIN — SODIUM CHLORIDE, PRESERVATIVE FREE 3 ML: 5 INJECTION INTRAVENOUS at 10:07

## 2017-07-22 RX ADMIN — POTASSIUM CHLORIDE 40 MEQ: 20 SOLUTION ORAL at 06:07

## 2017-07-22 RX ADMIN — POLYETHYLENE GLYCOL 3350 17 G: 17 POWDER, FOR SOLUTION ORAL at 09:07

## 2017-07-22 RX ADMIN — FUROSEMIDE 20 MG/HR: 10 INJECTION, SOLUTION INTRAVENOUS at 03:07

## 2017-07-22 RX ADMIN — MAGNESIUM OXIDE TAB 400 MG (241.3 MG ELEMENTAL MG) 400 MG: 400 (241.3 MG) TAB at 09:07

## 2017-07-22 RX ADMIN — DOBUTAMINE HYDROCHLORIDE 5 MCG/KG/MIN: 400 INJECTION INTRAVENOUS at 05:07

## 2017-07-22 RX ADMIN — SENNOSIDES 8.6 MG: 8.6 TABLET, FILM COATED ORAL at 09:07

## 2017-07-22 RX ADMIN — SODIUM CHLORIDE, PRESERVATIVE FREE 3 ML: 5 INJECTION INTRAVENOUS at 05:07

## 2017-07-22 RX ADMIN — PRAVASTATIN SODIUM 20 MG: 20 TABLET ORAL at 09:07

## 2017-07-22 RX ADMIN — FAMOTIDINE 20 MG: 20 TABLET, FILM COATED ORAL at 09:07

## 2017-07-22 RX ADMIN — HEPARIN SODIUM AND DEXTROSE 16 UNITS/KG/HR: 10000; 5 INJECTION INTRAVENOUS at 05:07

## 2017-07-22 NOTE — SUBJECTIVE & OBJECTIVE
Interval History: NAEON. Feels well and denies any complaints.    Continuous Infusions:   DOBUTamine 5 mcg/kg/min (07/22/17 1000)    furosemide (LASIX) 5 mg/mL infusion (non-titrating) 20 mg/hr (07/22/17 1000)    heparin (porcine) in D5W 16 Units/kg/hr (07/22/17 1000)     Scheduled Meds:   amiodarone  400 mg Oral BID    famotidine  20 mg Oral Daily    magnesium oxide  400 mg Oral BID    polyethylene glycol  17 g Oral Daily    pravastatin  20 mg Oral QHS    senna  8.6 mg Oral Daily    sodium chloride 0.9%  3 mL Intravenous Q8H     PRN Meds:bisacodyl, ondansetron, pneumococcal vaccine    Review of patient's allergies indicates:  No Known Allergies  Objective:     Vital Signs (Most Recent):  Temp: 98.4 °F (36.9 °C) (07/22/17 0700)  Pulse: (!) 59 (07/22/17 1015)  Resp: (!) 23 (07/22/17 1015)  BP: 132/72 (07/22/17 1000)  SpO2: 99 % (07/22/17 1015) Vital Signs (24h Range):  Temp:  [97.4 °F (36.3 °C)-98.4 °F (36.9 °C)] 98.4 °F (36.9 °C)  Pulse:  [59-70] 59  Resp:  [8-39] 23  SpO2:  [95 %-100 %] 99 %  BP: ()/(52-86) 132/72     Weight: 79.8 kg (175 lb 14.8 oz)  Body mass index is 26.75 kg/m².      Intake/Output Summary (Last 24 hours) at 07/22/17 1102  Last data filed at 07/22/17 1000   Gross per 24 hour   Intake           1289.7 ml   Output             2615 ml   Net          -1325.3 ml       Hemodynamic Parameters:       Telemetry: no events    Physical Exam  Constitutional: NAD, conversant  HEENT: Sclera anicteric, PERRLA, EOMI  Neck: Positive JVD up to angle of jaw, no carotid bruits  CV: RRR, no murmur, normal S1/S2, +S3, No Pericardial rub  Pulm: CTAB with no wheezes, rales, or rhonchi  GI: Abdomen soft, NTND, +BS  Extremities: No LE edema, warm and well perfused, No cyanosis, No clubbing, left groin IABP in place w/o evidence of hematoma  Skin: No ecchymosis, erythema, or ulcers  Psych: AOx3, appropriate affect  Neuro: CNII-XII intact, no focal deficits       Significant Labs:  CBC:    Recent Labs  Lab  07/22/17 0352   WBC 5.52   RBC 4.90   HGB 14.1   HCT 40.9      MCV 84   MCH 28.8   MCHC 34.5     BNP:    Recent Labs  Lab 07/21/17  0619   *     CMP:    Recent Labs  Lab 07/22/17 0352   GLU 79  79   CALCIUM 9.8  9.8   ALBUMIN 3.4*   PROT 7.7   *  132*   K 3.8  3.8   CO2 29  29   CL 90*  90*   BUN 21  21   CREATININE 1.6*  1.6*   ALKPHOS 63   ALT 10   AST 17   BILITOT 1.9*      Coagulation:     Recent Labs  Lab 07/22/17 0352   INR 1.2     LDH:  No results for input(s): LDH in the last 72 hours.  Microbiology:  Microbiology Results (last 7 days)     ** No results found for the last 168 hours. **          I have reviewed all pertinent labs within the past 24 hours.    Estimated Creatinine Clearance: 43.3 mL/min (based on Cr of 1.6).    Diagnostic Results:  I have reviewed all pertinent imaging results/findings within the past 24 hours.

## 2017-07-22 NOTE — PLAN OF CARE
Problem: Patient Care Overview  Goal: Plan of Care Review  Outcome: Ongoing (interventions implemented as appropriate)  VSS within the shift. Conscious and coherent. On paced rhythm. With IABP on following settings: Auto, EKG trigger, 1:1. No bleeding or tenderness on left femoral puncture site. CVP was noted to be 2. Dr. Newman was made aware and ordered just to continue present management. Patient tolerates room air. O2 saturations were noted >97%. Awaiting for VAD surgery work up orders. POC discussed with patient and wife. Will continue to monitor.

## 2017-07-22 NOTE — PROGRESS NOTES
[facility]  Heart Transplant  Progress Note    Patient Name: Suman Hayden  MRN: [id number]  Admission Date: 7/18/2017  Hospital Length of Stay: 4 days  Attending Physician: Angie Torres MD  Primary Care Provider: Joe Ernst MD  Principal Problem:Acute on chronic combined systolic and diastolic heart failure    Subjective:     Interval History: NAEON. Feels well and denies any complaints.    Continuous Infusions:   DOBUTamine 5 mcg/kg/min (07/22/17 1000)    furosemide (LASIX) 5 mg/mL infusion (non-titrating) 20 mg/hr (07/22/17 1000)    heparin (porcine) in D5W 16 Units/kg/hr (07/22/17 1000)     Scheduled Meds:   amiodarone  400 mg Oral BID    famotidine  20 mg Oral Daily    magnesium oxide  400 mg Oral BID    polyethylene glycol  17 g Oral Daily    pravastatin  20 mg Oral QHS    senna  8.6 mg Oral Daily    sodium chloride 0.9%  3 mL Intravenous Q8H     PRN Meds:bisacodyl, ondansetron, pneumococcal vaccine    Review of patient's allergies indicates:  No Known Allergies  Objective:     Vital Signs (Most Recent):  Temp: 98.4 °F (36.9 °C) (07/22/17 0700)  Pulse: (!) 59 (07/22/17 1015)  Resp: (!) 23 (07/22/17 1015)  BP: 132/72 (07/22/17 1000)  SpO2: 99 % (07/22/17 1015) Vital Signs (24h Range):  Temp:  [97.4 °F (36.3 °C)-98.4 °F (36.9 °C)] 98.4 °F (36.9 °C)  Pulse:  [59-70] 59  Resp:  [8-39] 23  SpO2:  [95 %-100 %] 99 %  BP: ()/(52-86) 132/72     Weight: 79.8 kg (175 lb 14.8 oz)  Body mass index is 26.75 kg/m².      Intake/Output Summary (Last 24 hours) at 07/22/17 1102  Last data filed at 07/22/17 1000   Gross per 24 hour   Intake           1289.7 ml   Output             2615 ml   Net          -1325.3 ml       Hemodynamic Parameters:       Telemetry: no events    Physical Exam  Constitutional: NAD, conversant  HEENT: Sclera anicteric, PERRLA, EOMI  Neck: Positive JVD up to angle of jaw, no carotid bruits  CV: RRR, no murmur, normal S1/S2, +S3, No Pericardial rub  Pulm: CTAB with  no wheezes, rales, or rhonchi  GI: Abdomen soft, NTND, +BS  Extremities: No LE edema, warm and well perfused, No cyanosis, No clubbing, left groin IABP in place w/o evidence of hematoma  Skin: No ecchymosis, erythema, or ulcers  Psych: AOx3, appropriate affect  Neuro: CNII-XII intact, no focal deficits       Significant Labs:  CBC:    Recent Labs  Lab 07/22/17  0352   WBC 5.52   RBC 4.90   HGB 14.1   HCT 40.9      MCV 84   MCH 28.8   MCHC 34.5     BNP:    Recent Labs  Lab 07/21/17  0619   *     CMP:    Recent Labs  Lab 07/22/17  0352   GLU 79  79   CALCIUM 9.8  9.8   ALBUMIN 3.4*   PROT 7.7   *  132*   K 3.8  3.8   CO2 29  29   CL 90*  90*   BUN 21  21   CREATININE 1.6*  1.6*   ALKPHOS 63   ALT 10   AST 17   BILITOT 1.9*      Coagulation:     Recent Labs  Lab 07/22/17  0352   INR 1.2     LDH:  No results for input(s): LDH in the last 72 hours.  Microbiology:  Microbiology Results (last 7 days)     ** No results found for the last 168 hours. **          I have reviewed all pertinent labs within the past 24 hours.    Estimated Creatinine Clearance: 43.3 mL/min (based on Cr of 1.6).    Diagnostic Results:  I have reviewed all pertinent imaging results/findings within the past 24 hours.    Assessment and Plan:     * Acute on chronic combined systolic and diastolic heart failure    - c/w  at 5 mcg/kg/min  - c/w lasix 20 mg/hr  - IABP 1:1 support with diastolic augmentation 120s  - D/c delgado catheter  - CVP 4, SVO2 70%  - Net negative 1.4L in the last 24 hours  - LVAD sx planned for Tuesday, 7/25/17  - Repeat TTE Monday 7/24/17 to assess RV function after diuresis        AICD discharge    - appropriate in the setting of VT aggravated by underlying AT/AFL        V-tach    - amiodarone 400 mg PO BID until 8/2/17 the 400 mg QD  - monitor lytes; keep K>4.0, Mg>2.0  - appreciate EP recs        Atrial tachycardia    - [id number] - 3  - c/w amiodarone  - c/w heparin gtt        INDIRA (acute kidney  injury)    - pre-renal due to hypervolemia  - c/w lasix gtt  - improving  - avoid nephrotoxic agents        Hepatitis B core antibody positive since 2012    - will defer liver biopsy as CTS not requiring it  - ID consult in place for clearance for VAD sx  - no evidence of liver failure  - TBILI stable at 1.8-1.9  - AST/ALT WNL  - case discussed with hepatology, low suspicion for liver involvement  - check RUQ ultrasound to assess for liver pathology         Hyperlipidemia    - c/w pravastatin          Case discussed with attending.    Lorena Payton MD  Heart Transplant  [facility]

## 2017-07-23 LAB
ALBUMIN SERPL BCP-MCNC: 3.3 G/DL
ALLENS TEST: ABNORMAL
ALP SERPL-CCNC: 66 U/L
ALT SERPL W/O P-5'-P-CCNC: 8 U/L
ANION GAP SERPL CALC-SCNC: 14 MMOL/L
AST SERPL-CCNC: 16 U/L
BASOPHILS # BLD AUTO: 0.04 K/UL
BASOPHILS NFR BLD: 0.7 %
BILIRUB SERPL-MCNC: 2 MG/DL
BUN SERPL-MCNC: 23 MG/DL
CALCIUM SERPL-MCNC: 9.7 MG/DL
CHLORIDE SERPL-SCNC: 89 MMOL/L
CO2 SERPL-SCNC: 28 MMOL/L
CREAT SERPL-MCNC: 1.6 MG/DL
DIFFERENTIAL METHOD: ABNORMAL
EOSINOPHIL # BLD AUTO: 0.2 K/UL
EOSINOPHIL NFR BLD: 3.7 %
ERYTHROCYTE [DISTWIDTH] IN BLOOD BY AUTOMATED COUNT: 15.2 %
EST. GFR  (AFRICAN AMERICAN): 50.8 ML/MIN/1.73 M^2
EST. GFR  (NON AFRICAN AMERICAN): 43.9 ML/MIN/1.73 M^2
FACT X PPP CHRO-ACNC: 0.68 IU/ML
GLUCOSE SERPL-MCNC: 90 MG/DL
HCO3 UR-SCNC: 34.5 MMOL/L (ref 24–28)
HCT VFR BLD AUTO: 42.8 %
HGB BLD-MCNC: 14.6 G/DL
INR PPP: 1.3
LDH SERPL L TO P-CCNC: 519 U/L
LYMPHOCYTES # BLD AUTO: 1.5 K/UL
LYMPHOCYTES NFR BLD: 25.9 %
MAGNESIUM SERPL-MCNC: 2.4 MG/DL
MCH RBC QN AUTO: 28.2 PG
MCHC RBC AUTO-ENTMCNC: 34.1 G/DL
MCV RBC AUTO: 83 FL
MONOCYTES # BLD AUTO: 0.8 K/UL
MONOCYTES NFR BLD: 14.2 %
NEUTROPHILS # BLD AUTO: 3.3 K/UL
NEUTROPHILS NFR BLD: 55.2 %
PCO2 BLDA: 50 MMHG (ref 35–45)
PH SMN: 7.45 [PH] (ref 7.35–7.45)
PHOSPHATE SERPL-MCNC: 4.1 MG/DL
PLATELET # BLD AUTO: 191 K/UL
PMV BLD AUTO: 11.1 FL
PO2 BLDA: 33 MMHG (ref 40–60)
POC BE: 11 MMOL/L
POC SATURATED O2: 65 % (ref 95–100)
POC TCO2: 36 MMOL/L (ref 24–29)
POTASSIUM SERPL-SCNC: 3.8 MMOL/L
PROT SERPL-MCNC: 8 G/DL
PROTHROMBIN TIME: 13.9 SEC
RBC # BLD AUTO: 5.17 M/UL
SAMPLE: ABNORMAL
SITE: ABNORMAL
SODIUM SERPL-SCNC: 131 MMOL/L
WBC # BLD AUTO: 5.91 K/UL

## 2017-07-23 PROCEDURE — 85610 PROTHROMBIN TIME: CPT

## 2017-07-23 PROCEDURE — 80053 COMPREHEN METABOLIC PANEL: CPT

## 2017-07-23 PROCEDURE — 20000000 HC ICU ROOM

## 2017-07-23 PROCEDURE — 99233 SBSQ HOSP IP/OBS HIGH 50: CPT | Mod: ,,, | Performed by: INTERNAL MEDICINE

## 2017-07-23 PROCEDURE — 84100 ASSAY OF PHOSPHORUS: CPT

## 2017-07-23 PROCEDURE — 63600175 PHARM REV CODE 636 W HCPCS: Performed by: STUDENT IN AN ORGANIZED HEALTH CARE EDUCATION/TRAINING PROGRAM

## 2017-07-23 PROCEDURE — 85025 COMPLETE CBC W/AUTO DIFF WBC: CPT

## 2017-07-23 PROCEDURE — 94761 N-INVAS EAR/PLS OXIMETRY MLT: CPT

## 2017-07-23 PROCEDURE — 27000202 HC IABP, ADD'L DAY

## 2017-07-23 PROCEDURE — 25000003 PHARM REV CODE 250: Performed by: STUDENT IN AN ORGANIZED HEALTH CARE EDUCATION/TRAINING PROGRAM

## 2017-07-23 PROCEDURE — 27000248 HC VAD-ADDITIONAL DAY

## 2017-07-23 PROCEDURE — 25000003 PHARM REV CODE 250: Performed by: INTERNAL MEDICINE

## 2017-07-23 PROCEDURE — 63600175 PHARM REV CODE 636 W HCPCS: Performed by: INTERNAL MEDICINE

## 2017-07-23 PROCEDURE — 83615 LACTATE (LD) (LDH) ENZYME: CPT

## 2017-07-23 PROCEDURE — 85520 HEPARIN ASSAY: CPT

## 2017-07-23 PROCEDURE — 83735 ASSAY OF MAGNESIUM: CPT

## 2017-07-23 PROCEDURE — 82803 BLOOD GASES ANY COMBINATION: CPT

## 2017-07-23 RX ORDER — POTASSIUM CHLORIDE 20 MEQ/1
40 TABLET, EXTENDED RELEASE ORAL ONCE
Status: COMPLETED | OUTPATIENT
Start: 2017-07-23 | End: 2017-07-23

## 2017-07-23 RX ORDER — CHOLESTYRAMINE 4 G/9G
1 POWDER, FOR SUSPENSION ORAL 2 TIMES DAILY
Status: DISCONTINUED | OUTPATIENT
Start: 2017-07-23 | End: 2017-07-27

## 2017-07-23 RX ADMIN — DOBUTAMINE HYDROCHLORIDE 5 MCG/KG/MIN: 400 INJECTION INTRAVENOUS at 06:07

## 2017-07-23 RX ADMIN — AMIODARONE HYDROCHLORIDE 400 MG: 200 TABLET ORAL at 08:07

## 2017-07-23 RX ADMIN — HEPARIN SODIUM AND DEXTROSE 16 UNITS/KG/HR: 10000; 5 INJECTION INTRAVENOUS at 09:07

## 2017-07-23 RX ADMIN — FUROSEMIDE 15 MG/HR: 10 INJECTION, SOLUTION INTRAVENOUS at 12:07

## 2017-07-23 RX ADMIN — SODIUM CHLORIDE, PRESERVATIVE FREE 3 ML: 5 INJECTION INTRAVENOUS at 10:07

## 2017-07-23 RX ADMIN — CHOLESTYRAMINE 4 G: 4 POWDER, FOR SUSPENSION ORAL at 09:07

## 2017-07-23 RX ADMIN — HEPARIN SODIUM AND DEXTROSE 16 UNITS/KG/HR: 10000; 5 INJECTION INTRAVENOUS at 01:07

## 2017-07-23 RX ADMIN — POLYETHYLENE GLYCOL 3350 17 G: 17 POWDER, FOR SOLUTION ORAL at 08:07

## 2017-07-23 RX ADMIN — FAMOTIDINE 20 MG: 20 TABLET, FILM COATED ORAL at 08:07

## 2017-07-23 RX ADMIN — PRAVASTATIN SODIUM 20 MG: 20 TABLET ORAL at 09:07

## 2017-07-23 RX ADMIN — CHOLESTYRAMINE 4 G: 4 POWDER, FOR SUSPENSION ORAL at 12:07

## 2017-07-23 RX ADMIN — SENNOSIDES 8.6 MG: 8.6 TABLET, FILM COATED ORAL at 08:07

## 2017-07-23 RX ADMIN — MAGNESIUM OXIDE TAB 400 MG (241.3 MG ELEMENTAL MG) 400 MG: 400 (241.3 MG) TAB at 08:07

## 2017-07-23 RX ADMIN — MAGNESIUM OXIDE TAB 400 MG (241.3 MG ELEMENTAL MG) 400 MG: 400 (241.3 MG) TAB at 09:07

## 2017-07-23 RX ADMIN — AMIODARONE HYDROCHLORIDE 400 MG: 200 TABLET ORAL at 09:07

## 2017-07-23 RX ADMIN — SODIUM CHLORIDE, PRESERVATIVE FREE 3 ML: 5 INJECTION INTRAVENOUS at 01:07

## 2017-07-23 RX ADMIN — POTASSIUM CHLORIDE 40 MEQ: 1500 TABLET, EXTENDED RELEASE ORAL at 07:07

## 2017-07-23 RX ADMIN — ONDANSETRON 8 MG: 2 INJECTION INTRAMUSCULAR; INTRAVENOUS at 08:07

## 2017-07-23 NOTE — PLAN OF CARE
Problem: Patient Care Overview  Goal: Plan of Care Review  Outcome: Ongoing (interventions implemented as appropriate)  No acute events throughout day. IABP remains 1:1 ECG trigger and augmenting in the 120's and 130's. LVAD implantation planned for Tuesday ,7/25. CVP 3. Remains on Lasix, , and Heparin.  2 BM's throughout the day.  VS and assessment per flow sheet, patient progressing towards goals as tolerated, plan of care reviewed with Suman Hayden and family, all concerns addressed, will continue to monitor.

## 2017-07-23 NOTE — PLAN OF CARE
Problem: Patient Care Overview  Goal: Plan of Care Review  Outcome: Ongoing (interventions implemented as appropriate)  Pt. AAOx4. Wife at bedside. Balloon pump on Augmenting 100-120, on current setting of Auto EKG 1:1 Trigger. Dobutamine, Lasix, and Heprain infusing per orders. No complaints of pain. SVO2 70 this am. Paln for LVAD next week. Pt and wife updated on POC. Questions answered. Will continue to monitor.   Tonja Bhatt RN

## 2017-07-24 LAB
ABO + RH BLD: NORMAL
ALBUMIN SERPL BCP-MCNC: 3.2 G/DL
ALLENS TEST: ABNORMAL
ALP SERPL-CCNC: 65 U/L
ALT SERPL W/O P-5'-P-CCNC: 9 U/L
ANION GAP SERPL CALC-SCNC: 13 MMOL/L
AST SERPL-CCNC: 16 U/L
BACTERIA #/AREA URNS AUTO: ABNORMAL /HPF
BASOPHILS # BLD AUTO: 0.02 K/UL
BASOPHILS # BLD AUTO: 0.03 K/UL
BASOPHILS NFR BLD: 0.3 %
BASOPHILS NFR BLD: 0.5 %
BILIRUB DIRECT SERPL-MCNC: 1.2 MG/DL
BILIRUB SERPL-MCNC: 2 MG/DL
BILIRUB UR QL STRIP: NEGATIVE
BLD GP AB SCN CELLS X3 SERPL QL: NORMAL
BNP SERPL-MCNC: 809 PG/ML
BUN SERPL-MCNC: 27 MG/DL
CALCIUM SERPL-MCNC: 9.6 MG/DL
CHLORIDE SERPL-SCNC: 90 MMOL/L
CLARITY UR REFRACT.AUTO: ABNORMAL
CO2 SERPL-SCNC: 28 MMOL/L
COLOR UR AUTO: YELLOW
CREAT SERPL-MCNC: 1.7 MG/DL
DIFFERENTIAL METHOD: ABNORMAL
DIFFERENTIAL METHOD: ABNORMAL
EOSINOPHIL # BLD AUTO: 0.2 K/UL
EOSINOPHIL # BLD AUTO: 0.2 K/UL
EOSINOPHIL NFR BLD: 3 %
EOSINOPHIL NFR BLD: 3.3 %
ERYTHROCYTE [DISTWIDTH] IN BLOOD BY AUTOMATED COUNT: 15.1 %
ERYTHROCYTE [DISTWIDTH] IN BLOOD BY AUTOMATED COUNT: 15.2 %
EST. GFR  (AFRICAN AMERICAN): 47.2 ML/MIN/1.73 M^2
EST. GFR  (NON AFRICAN AMERICAN): 40.8 ML/MIN/1.73 M^2
ESTIMATED PA SYSTOLIC PRESSURE: 24.6
FACT X PPP CHRO-ACNC: 0.65 IU/ML
GLUCOSE SERPL-MCNC: 96 MG/DL
GLUCOSE UR QL STRIP: NEGATIVE
HCO3 UR-SCNC: 34.5 MMOL/L (ref 24–28)
HCT VFR BLD AUTO: 42.7 %
HCT VFR BLD AUTO: 43.3 %
HGB BLD-MCNC: 14.9 G/DL
HGB BLD-MCNC: 14.9 G/DL
HGB UR QL STRIP: ABNORMAL
INR PPP: 1.3
KETONES UR QL STRIP: NEGATIVE
LEUKOCYTE ESTERASE UR QL STRIP: ABNORMAL
LYMPHOCYTES # BLD AUTO: 1.3 K/UL
LYMPHOCYTES # BLD AUTO: 1.3 K/UL
LYMPHOCYTES NFR BLD: 21.1 %
LYMPHOCYTES NFR BLD: 21.7 %
MAGNESIUM SERPL-MCNC: 2.3 MG/DL
MCH RBC QN AUTO: 28.9 PG
MCH RBC QN AUTO: 29.1 PG
MCHC RBC AUTO-ENTMCNC: 34.4 G/DL
MCHC RBC AUTO-ENTMCNC: 34.9 G/DL
MCV RBC AUTO: 83 FL
MCV RBC AUTO: 84 FL
MICROSCOPIC COMMENT: ABNORMAL
MITRAL VALVE REGURGITATION: ABNORMAL
MONOCYTES # BLD AUTO: 1.1 K/UL
MONOCYTES # BLD AUTO: 1.2 K/UL
MONOCYTES NFR BLD: 19 %
MONOCYTES NFR BLD: 19.7 %
NEUTROPHILS # BLD AUTO: 3.3 K/UL
NEUTROPHILS # BLD AUTO: 3.4 K/UL
NEUTROPHILS NFR BLD: 55.3 %
NEUTROPHILS NFR BLD: 55.6 %
NITRITE UR QL STRIP: NEGATIVE
PCO2 BLDA: 49.6 MMHG (ref 35–45)
PH SMN: 7.45 [PH] (ref 7.35–7.45)
PH UR STRIP: 6 [PH] (ref 5–8)
PHOSPHATE SERPL-MCNC: 4 MG/DL
PLATELET # BLD AUTO: 180 K/UL
PLATELET # BLD AUTO: 184 K/UL
PLATELET FUNCTION ASSAY - EPINEPHRINE: 119 SECS
PMV BLD AUTO: 11.1 FL
PMV BLD AUTO: 11.1 FL
PO2 BLDA: 31 MMHG (ref 40–60)
POC BE: 10 MMOL/L
POC SATURATED O2: 61 % (ref 95–100)
POC TCO2: 36 MMOL/L (ref 24–29)
POTASSIUM SERPL-SCNC: 4 MMOL/L
PROT SERPL-MCNC: 7.9 G/DL
PROT UR QL STRIP: NEGATIVE
PROTHROMBIN TIME: 13.4 SEC
RBC # BLD AUTO: 5.12 M/UL
RBC # BLD AUTO: 5.16 M/UL
RBC #/AREA URNS AUTO: 32 /HPF (ref 0–4)
RETIRED EF AND QEF - SEE NOTES: 23 (ref 55–65)
SAMPLE: ABNORMAL
SITE: ABNORMAL
SODIUM SERPL-SCNC: 131 MMOL/L
SP GR UR STRIP: 1.01 (ref 1–1.03)
TRICUSPID VALVE REGURGITATION: ABNORMAL
URATE SERPL-MCNC: 7.6 MG/DL
URN SPEC COLLECT METH UR: ABNORMAL
UROBILINOGEN UR STRIP-ACNC: 2 EU/DL
WBC # BLD AUTO: 5.94 K/UL
WBC # BLD AUTO: 6.1 K/UL
WBC #/AREA URNS AUTO: >100 /HPF (ref 0–5)

## 2017-07-24 PROCEDURE — 20000000 HC ICU ROOM

## 2017-07-24 PROCEDURE — 87040 BLOOD CULTURE FOR BACTERIA: CPT | Mod: 59

## 2017-07-24 PROCEDURE — 82248 BILIRUBIN DIRECT: CPT

## 2017-07-24 PROCEDURE — 83735 ASSAY OF MAGNESIUM: CPT

## 2017-07-24 PROCEDURE — 82803 BLOOD GASES ANY COMBINATION: CPT

## 2017-07-24 PROCEDURE — 25000003 PHARM REV CODE 250: Performed by: STUDENT IN AN ORGANIZED HEALTH CARE EDUCATION/TRAINING PROGRAM

## 2017-07-24 PROCEDURE — 25000003 PHARM REV CODE 250: Performed by: INTERNAL MEDICINE

## 2017-07-24 PROCEDURE — 87088 URINE BACTERIA CULTURE: CPT

## 2017-07-24 PROCEDURE — 83880 ASSAY OF NATRIURETIC PEPTIDE: CPT

## 2017-07-24 PROCEDURE — 27000202 HC IABP, ADD'L DAY

## 2017-07-24 PROCEDURE — 86900 BLOOD TYPING SEROLOGIC ABO: CPT

## 2017-07-24 PROCEDURE — 93306 TTE W/DOPPLER COMPLETE: CPT | Mod: 26,,, | Performed by: INTERNAL MEDICINE

## 2017-07-24 PROCEDURE — 63600175 PHARM REV CODE 636 W HCPCS: Performed by: NURSE PRACTITIONER

## 2017-07-24 PROCEDURE — 25000003 PHARM REV CODE 250: Performed by: NURSE PRACTITIONER

## 2017-07-24 PROCEDURE — 85576 BLOOD PLATELET AGGREGATION: CPT

## 2017-07-24 PROCEDURE — 94761 N-INVAS EAR/PLS OXIMETRY MLT: CPT

## 2017-07-24 PROCEDURE — 93287 PERI-PX DEVICE EVAL & PRGR: CPT | Mod: 26,,, | Performed by: INTERNAL MEDICINE

## 2017-07-24 PROCEDURE — 85520 HEPARIN ASSAY: CPT

## 2017-07-24 PROCEDURE — 93287 PERI-PX DEVICE EVAL & PRGR: CPT | Mod: 26,59,, | Performed by: INTERNAL MEDICINE

## 2017-07-24 PROCEDURE — 86920 COMPATIBILITY TEST SPIN: CPT

## 2017-07-24 PROCEDURE — 87077 CULTURE AEROBIC IDENTIFY: CPT

## 2017-07-24 PROCEDURE — A4216 STERILE WATER/SALINE, 10 ML: HCPCS | Performed by: STUDENT IN AN ORGANIZED HEALTH CARE EDUCATION/TRAINING PROGRAM

## 2017-07-24 PROCEDURE — 84100 ASSAY OF PHOSPHORUS: CPT

## 2017-07-24 PROCEDURE — 86850 RBC ANTIBODY SCREEN: CPT

## 2017-07-24 PROCEDURE — 80053 COMPREHEN METABOLIC PANEL: CPT

## 2017-07-24 PROCEDURE — 85025 COMPLETE CBC W/AUTO DIFF WBC: CPT

## 2017-07-24 PROCEDURE — 99233 SBSQ HOSP IP/OBS HIGH 50: CPT | Mod: ,,, | Performed by: INTERNAL MEDICINE

## 2017-07-24 PROCEDURE — 81001 URINALYSIS AUTO W/SCOPE: CPT

## 2017-07-24 PROCEDURE — 84550 ASSAY OF BLOOD/URIC ACID: CPT

## 2017-07-24 PROCEDURE — 83051 HEMOGLOBIN PLASMA: CPT

## 2017-07-24 PROCEDURE — 63600175 PHARM REV CODE 636 W HCPCS: Performed by: STUDENT IN AN ORGANIZED HEALTH CARE EDUCATION/TRAINING PROGRAM

## 2017-07-24 PROCEDURE — 85610 PROTHROMBIN TIME: CPT

## 2017-07-24 PROCEDURE — 87186 SC STD MICRODIL/AGAR DIL: CPT

## 2017-07-24 PROCEDURE — 36415 COLL VENOUS BLD VENIPUNCTURE: CPT

## 2017-07-24 PROCEDURE — 93287 PERI-PX DEVICE EVAL & PRGR: CPT

## 2017-07-24 PROCEDURE — 87086 URINE CULTURE/COLONY COUNT: CPT

## 2017-07-24 PROCEDURE — 27000248 HC VAD-ADDITIONAL DAY

## 2017-07-24 PROCEDURE — C8929 TTE W OR WO FOL WCON,DOPPLER: HCPCS

## 2017-07-24 RX ORDER — CEFEPIME HYDROCHLORIDE 1 G/50ML
1 INJECTION, SOLUTION INTRAVENOUS
Status: DISCONTINUED | OUTPATIENT
Start: 2017-07-25 | End: 2017-07-26

## 2017-07-24 RX ORDER — MUPIROCIN 20 MG/G
1 OINTMENT TOPICAL
Status: DISCONTINUED | OUTPATIENT
Start: 2017-07-24 | End: 2017-07-27 | Stop reason: HOSPADM

## 2017-07-24 RX ORDER — FLUCONAZOLE 2 MG/ML
400 INJECTION, SOLUTION INTRAVENOUS
Status: DISCONTINUED | OUTPATIENT
Start: 2017-07-24 | End: 2017-07-26

## 2017-07-24 RX ADMIN — PRAVASTATIN SODIUM 20 MG: 20 TABLET ORAL at 08:07

## 2017-07-24 RX ADMIN — MAGNESIUM OXIDE TAB 400 MG (241.3 MG ELEMENTAL MG) 400 MG: 400 (241.3 MG) TAB at 08:07

## 2017-07-24 RX ADMIN — PHYTONADIONE 10 MG: 10 INJECTION, EMULSION INTRAMUSCULAR; INTRAVENOUS; SUBCUTANEOUS at 05:07

## 2017-07-24 RX ADMIN — PHYTONADIONE 10 MG: 10 INJECTION, EMULSION INTRAMUSCULAR; INTRAVENOUS; SUBCUTANEOUS at 11:07

## 2017-07-24 RX ADMIN — CHOLESTYRAMINE 4 G: 4 POWDER, FOR SUSPENSION ORAL at 08:07

## 2017-07-24 RX ADMIN — POLYETHYLENE GLYCOL 3350 17 G: 17 POWDER, FOR SOLUTION ORAL at 08:07

## 2017-07-24 RX ADMIN — FAMOTIDINE 20 MG: 20 TABLET, FILM COATED ORAL at 08:07

## 2017-07-24 RX ADMIN — SODIUM CHLORIDE, PRESERVATIVE FREE 3 ML: 5 INJECTION INTRAVENOUS at 09:07

## 2017-07-24 RX ADMIN — SODIUM CHLORIDE, PRESERVATIVE FREE 3 ML: 5 INJECTION INTRAVENOUS at 01:07

## 2017-07-24 RX ADMIN — CHOLESTYRAMINE 4 G: 4 POWDER, FOR SUSPENSION ORAL at 09:07

## 2017-07-24 RX ADMIN — AMIODARONE HYDROCHLORIDE 400 MG: 200 TABLET ORAL at 08:07

## 2017-07-24 RX ADMIN — SODIUM CHLORIDE, PRESERVATIVE FREE 3 ML: 5 INJECTION INTRAVENOUS at 06:07

## 2017-07-24 RX ADMIN — DOBUTAMINE HYDROCHLORIDE 5 MCG/KG/MIN: 400 INJECTION INTRAVENOUS at 06:07

## 2017-07-24 RX ADMIN — SENNOSIDES 8.6 MG: 8.6 TABLET, FILM COATED ORAL at 08:07

## 2017-07-24 RX ADMIN — HEPARIN SODIUM AND DEXTROSE 16 UNITS/KG/HR: 10000; 5 INJECTION INTRAVENOUS at 08:07

## 2017-07-24 NOTE — PROGRESS NOTES
Pt AAAO, wife not at bedside.  Pt reports he is ready for VAD implant tomorrow and has no questions.  He will pass the message to his wife I came by and if she has questions, she will call.

## 2017-07-24 NOTE — PROGRESS NOTES
Update:    SW to pt's room for update. Pt and wife present, aaox4, calm, and pleasant. Pt's wife  Very communicative, and pt quiet. Pt and wife report coping well at this time. Pt's wife reports pt's children visited over weekend, and plan to come to hospital tomorrow prior to LVAD surgery. Pt's wife reports interested in BH room for the night of surgery. SW arranged BH room for tomorrow night, and explained pt's wife can request cardiology rate if she would like to stay longer than 1 night. Pt and wife report no other questions, concerns, or needs at this time. SW providing ongoing psychosocial counseling and support, education, assistance, resources, and discharge planning as indicated. SW continuing to follow and remains available.

## 2017-07-24 NOTE — PROGRESS NOTES
[facility]  Heart Transplant  Progress Note    Patient Name: Suman Hayden  MRN: [id number]  Admission Date: 7/18/2017  Hospital Length of Stay: 6 days  Attending Physician: Angie Torres MD  Primary Care Provider: Joe Ernst MD  Principal Problem:Acute on chronic combined systolic and diastolic heart failure    Subjective:     Interval History: NAEON. Feels well and denies any complaints. Tentatively scheduled for LVAD tomorrow pending ECHO results. Blood cx, UA, Ucx sent. No delgado currently. Left IJ and IABP 4 days old. Afebrile and no leukocytosis noted. T BILI still at 2.0. RUQ sonogram revealed GB sludge so the patient was started on cholestyramine.     Continuous Infusions:   DOBUTamine 5 mcg/kg/min (07/24/17 1200)    furosemide (LASIX) 5 mg/mL infusion (non-titrating) 15 mg/hr (07/24/17 1200)    heparin (porcine) in D5W 16 Units/kg/hr (07/24/17 1200)     Scheduled Meds:   amiodarone  400 mg Oral BID    cholestyramine  1 packet Oral BID    famotidine  20 mg Oral Daily    magnesium oxide  400 mg Oral BID    polyethylene glycol  17 g Oral Daily    pravastatin  20 mg Oral QHS    senna  8.6 mg Oral Daily    sodium chloride 0.9%  3 mL Intravenous Q8H     PRN Meds:bisacodyl, ondansetron, pneumococcal vaccine    Review of patient's allergies indicates:  No Known Allergies  Objective:     Vital Signs (Most Recent):  Temp: 98 °F (36.7 °C) (07/24/17 1100)  Pulse: 60 (07/24/17 1210)  Resp: (!) 26 (07/24/17 1210)  BP: 130/75 (07/24/17 0600)  SpO2: 98 % (07/24/17 1210) Vital Signs (24h Range):  Temp:  [97.7 °F (36.5 °C)-98.1 °F (36.7 °C)] 98 °F (36.7 °C)  Pulse:  [59-60] 60  Resp:  [14-32] 26  SpO2:  [95 %-99 %] 98 %  BP: (130)/(75) 130/75     Weight: 79.8 kg (175 lb 14.8 oz)  Body mass index is 26.75 kg/m².      Intake/Output Summary (Last 24 hours) at 07/24/17 1225  Last data filed at 07/24/17 1200   Gross per 24 hour   Intake           1519.8 ml   Output              875 ml   Net             644.8 ml       Hemodynamic Parameters:       Telemetry: no events    Physical Exam  Constitutional: NAD, conversant  HEENT: Sclera anicteric, PERRLA, EOMI  Neck: Positive JVD up to angle of jaw, no carotid bruits  CV: RRR, no murmur, normal S1/S2, +S3, No Pericardial rub  Pulm: CTAB with no wheezes, rales, or rhonchi  GI: Abdomen soft, NTND, +BS  Extremities: No LE edema, warm and well perfused, No cyanosis, No clubbing, left groin IABP in place w/o evidence of hematoma  Skin: No ecchymosis, erythema, or ulcers  Psych: AOx3, appropriate affect  Neuro: CNII-XII intact, no focal deficits       Significant Labs:  CBC:    Recent Labs  Lab 07/24/17  0550   WBC 6.10   RBC 5.12   HGB 14.9   HCT 42.7      MCV 83   MCH 29.1   MCHC 34.9     BNP:    Recent Labs  Lab 07/24/17  0420   *     CMP:    Recent Labs  Lab 07/24/17  0420   GLU 96   CALCIUM 9.6   ALBUMIN 3.2*   PROT 7.9   *   K 4.0   CO2 28   CL 90*   BUN 27*   CREATININE 1.7*   ALKPHOS 65   ALT 9*   AST 16   BILITOT 2.0*      Coagulation:     Recent Labs  Lab 07/24/17  0420   INR 1.3*     LDH:    Recent Labs  Lab 07/23/17  1128   *     Microbiology:  Microbiology Results (last 7 days)     Procedure Component Value Units Date/Time    Urine culture [[id number]] Collected:  07/24/17 1139    Order Status:  Sent Specimen:  Urine from Urine, Clean Catch Updated:  07/24/17 1140    Blood culture [[id number]] Collected:  07/24/17 1100    Order Status:  Sent Specimen:  Blood from Peripheral, Antecubital, Left Updated:  07/24/17 1110    Blood culture [[id number]]     Order Status:  No result Specimen:  Blood           I have reviewed all pertinent labs within the past 24 hours.    Estimated Creatinine Clearance: 40.8 mL/min (based on Cr of 1.7).    Diagnostic Results:  I have reviewed and interpreted all pertinent imaging results/findings within the past 24 hours.    TTE with contrast (07/24/17):  1 - Upper limit of normal left ventricular enlargement.      2 - Severely depressed left ventricular systolic function (EF 20-25%).     3 - Eccentric hypertrophy.     4 - Right ventricular enlargement with moderately depressed systolic function.     5 - Biatrial enlargement.     6 - Mild mitral regurgitation.     7 - Trivial tricuspid regurgitation.     8 - Mild pulmonic regurgitation.     9 - The estimated PA systolic pressure is greater than 25 mmHg.     Assessment and Plan:     * Acute on chronic combined systolic and diastolic heart failure    - c/w  at 5 mcg/kg/min  - c/w lasix 20 mg/hr  - IABP 1:1 support with diastolic augmentation 140s  - CXR reviewed which reveals stable positioning  - D/c delgado catheter  - CVP 4, SVO2 61%  - Net negative 2.7L in the last 24 hours  - LVAD sx planned for Tuesday, 7/25/17  - TTE today as above.        AICD discharge    - appropriate in the setting of VT aggravated by underlying AT/AFL        V-tach    - amiodarone 400 mg PO BID until 8/2/17 the 400 mg QD  - monitor lytes; keep K>4.0, Mg>2.0  - appreciate EP recs        Atrial tachycardia    - [id number] - 3  - c/w amiodarone  - c/w heparin gtt        INDIRA (acute kidney injury)    - pre-renal due to hypervolemia  - c/w lasix gtt  - improving  - avoid nephrotoxic agents        Hepatitis B core antibody positive since 2012    - will defer liver biopsy as CTS not requiring it  - ID consult in place for clearance for VAD sx  - no evidence of liver failure  - TBILI stable  - AST/ALT WNL  - case discussed with hepatology, low suspicion for liver involvement  - direct bili 1.2, TBILI 2.0  -         Hyperlipidemia    - c/w pravastatin          NPO after MN for possible LVAD tomorrow.    Lorena Payton MD  Heart Transplant  [facility]

## 2017-07-24 NOTE — PROGRESS NOTES
Consent obtained from wife ( per pt request. Pt lying flat and is on balloon pump).  optison given ivp via left ij tlc lumen. Denies transfusion rxn. Tolerated well .  Lumen flushed before and after with 5 cc ns. Tolerated well.  Aseptic technique maintained.

## 2017-07-24 NOTE — PROGRESS NOTES
[facility]  Heart Transplant  Progress Note    Patient Name: Suman Hayden  MRN: [id number]  Admission Date: 7/18/2017  Hospital Length of Stay: 5 days  Attending Physician: Angie Torres MD  Primary Care Provider: Joe Ernst MD  Principal Problem:Acute on chronic combined systolic and diastolic heart failure    Subjective:     Interval History: NAEON. Feels well.    Continuous Infusions:   DOBUTamine 5 mcg/kg/min (07/23/17 1800)    furosemide (LASIX) 5 mg/mL infusion (non-titrating) 15 mg/hr (07/23/17 1800)    heparin (porcine) in D5W 16 Units/kg/hr (07/23/17 1800)     Scheduled Meds:   amiodarone  400 mg Oral BID    cholestyramine  1 packet Oral BID    famotidine  20 mg Oral Daily    magnesium oxide  400 mg Oral BID    polyethylene glycol  17 g Oral Daily    pravastatin  20 mg Oral QHS    senna  8.6 mg Oral Daily    sodium chloride 0.9%  3 mL Intravenous Q8H     PRN Meds:bisacodyl, ondansetron, pneumococcal vaccine    Review of patient's allergies indicates:  No Known Allergies  Objective:     Vital Signs (Most Recent):  Temp: 98 °F (36.7 °C) (07/23/17 1500)  Pulse: (!) 59 (07/23/17 1852)  Resp: (!) 22 (07/23/17 1852)  BP: 139/78 (07/23/17 0600)  SpO2: 97 % (07/23/17 1852) Vital Signs (24h Range):  Temp:  [97.4 °F (36.3 °C)-98.3 °F (36.8 °C)] 98 °F (36.7 °C)  Pulse:  [59-60] 59  Resp:  [12-30] 22  SpO2:  [95 %-100 %] 97 %  BP: (102-139)/() 139/78     Weight: 79.8 kg (175 lb 14.8 oz)  Body mass index is 26.75 kg/m².      Intake/Output Summary (Last 24 hours) at 07/23/17 2038  Last data filed at 07/23/17 1800   Gross per 24 hour   Intake            506.3 ml   Output             1015 ml   Net           -508.7 ml       Hemodynamic Parameters:       Telemetry: no events    Physical Exam  Constitutional: NAD, conversant  HEENT: Sclera anicteric, PERRLA, EOMI  Neck: Positive JVD up to angle of jaw, no carotid bruits  CV: RRR, no murmur, normal S1/S2, +S3, No Pericardial rub  Pulm:  CTAB with no wheezes, rales, or rhonchi  GI: Abdomen soft, NTND, +BS  Extremities: No LE edema, warm and well perfused, No cyanosis, No clubbing, left groin IABP in place w/o evidence of hematoma  Skin: No ecchymosis, erythema, or ulcers  Psych: AOx3, appropriate affect  Neuro: CNII-XII intact, no focal deficits    Significant Labs:  CBC:    Recent Labs  Lab 07/23/17  0435   WBC 5.91   RBC 5.17   HGB 14.6   HCT 42.8      MCV 83   MCH 28.2   MCHC 34.1     BNP:    Recent Labs  Lab 07/21/17  0619   *     CMP:    Recent Labs  Lab 07/23/17  0435   GLU 90   CALCIUM 9.7   ALBUMIN 3.3*   PROT 8.0   *   K 3.8   CO2 28   CL 89*   BUN 23   CREATININE 1.6*   ALKPHOS 66   ALT 8*   AST 16   BILITOT 2.0*      Coagulation:     Recent Labs  Lab 07/23/17  0435   INR 1.3*     LDH:    Recent Labs  Lab 07/23/17  1128   *     Microbiology:  Microbiology Results (last 7 days)     ** No results found for the last 168 hours. **          I have reviewed all pertinent labs within the past 24 hours.    Estimated Creatinine Clearance: 43.3 mL/min (based on Cr of 1.6).    Diagnostic Results:  I have reviewed and interpreted all pertinent imaging results/findings within the past 24 hours.    Assessment and Plan:     * Acute on chronic combined systolic and diastolic heart failure    - c/w  at 5 mcg/kg/min  - c/w lasix 20 mg/hr  - IABP 1:1 support with diastolic augmentation 140s  - CXR reviewed which reveals stable positioning  - D/c delgado catheter  - CVP 3, SVO2 60%  - Net negative 1.1L in the last 24 hours  - LVAD sx planned for Tuesday, 7/25/17  - TTE Monday to assess RV function now that CVP low        AICD discharge    - appropriate in the setting of VT aggravated by underlying AT/AFL        V-tach    - amiodarone 400 mg PO BID until 8/2/17 the 400 mg QD  - monitor lytes; keep K>4.0, Mg>2.0  - appreciate EP recs        Atrial tachycardia    - [id number] - 3  - c/w amiodarone  - c/w heparin gtt        INDIRA (acute  kidney injury)    - pre-renal due to hypervolemia  - c/w lasix gtt  - improving  - avoid nephrotoxic agents        Hepatitis B core antibody positive since 2012    - will defer liver biopsy as CTS not requiring it  - ID consult in place for clearance for VAD sx  - no evidence of liver failure  - TBILI 2.0 (uptrending), check LDH, start cholestyramine (as per Dr. Downing)  - AST/ALT WNL  - case discussed with hepatology, low suspicion for liver involvement        Hyperlipidemia    - c/w pravastatin            Lorena Payton MD  Heart Transplant  [facility]

## 2017-07-24 NOTE — SUBJECTIVE & OBJECTIVE
Interval History: NAEON. Feels well.    Continuous Infusions:   DOBUTamine 5 mcg/kg/min (07/23/17 1800)    furosemide (LASIX) 5 mg/mL infusion (non-titrating) 15 mg/hr (07/23/17 1800)    heparin (porcine) in D5W 16 Units/kg/hr (07/23/17 1800)     Scheduled Meds:   amiodarone  400 mg Oral BID    cholestyramine  1 packet Oral BID    famotidine  20 mg Oral Daily    magnesium oxide  400 mg Oral BID    polyethylene glycol  17 g Oral Daily    pravastatin  20 mg Oral QHS    senna  8.6 mg Oral Daily    sodium chloride 0.9%  3 mL Intravenous Q8H     PRN Meds:bisacodyl, ondansetron, pneumococcal vaccine    Review of patient's allergies indicates:  No Known Allergies  Objective:     Vital Signs (Most Recent):  Temp: 98 °F (36.7 °C) (07/23/17 1500)  Pulse: (!) 59 (07/23/17 1852)  Resp: (!) 22 (07/23/17 1852)  BP: 139/78 (07/23/17 0600)  SpO2: 97 % (07/23/17 1852) Vital Signs (24h Range):  Temp:  [97.4 °F (36.3 °C)-98.3 °F (36.8 °C)] 98 °F (36.7 °C)  Pulse:  [59-60] 59  Resp:  [12-30] 22  SpO2:  [95 %-100 %] 97 %  BP: (102-139)/() 139/78     Weight: 79.8 kg (175 lb 14.8 oz)  Body mass index is 26.75 kg/m².      Intake/Output Summary (Last 24 hours) at 07/23/17 2038  Last data filed at 07/23/17 1800   Gross per 24 hour   Intake            506.3 ml   Output             1015 ml   Net           -508.7 ml       Hemodynamic Parameters:       Telemetry: no events    Physical Exam  Constitutional: NAD, conversant  HEENT: Sclera anicteric, PERRLA, EOMI  Neck: Positive JVD up to angle of jaw, no carotid bruits  CV: RRR, no murmur, normal S1/S2, +S3, No Pericardial rub  Pulm: CTAB with no wheezes, rales, or rhonchi  GI: Abdomen soft, NTND, +BS  Extremities: No LE edema, warm and well perfused, No cyanosis, No clubbing, left groin IABP in place w/o evidence of hematoma  Skin: No ecchymosis, erythema, or ulcers  Psych: AOx3, appropriate affect  Neuro: CNII-XII intact, no focal deficits    Significant Labs:  CBC:    Recent  Labs  Lab 07/23/17  0435   WBC 5.91   RBC 5.17   HGB 14.6   HCT 42.8      MCV 83   MCH 28.2   MCHC 34.1     BNP:    Recent Labs  Lab 07/21/17  0619   *     CMP:    Recent Labs  Lab 07/23/17  0435   GLU 90   CALCIUM 9.7   ALBUMIN 3.3*   PROT 8.0   *   K 3.8   CO2 28   CL 89*   BUN 23   CREATININE 1.6*   ALKPHOS 66   ALT 8*   AST 16   BILITOT 2.0*      Coagulation:     Recent Labs  Lab 07/23/17  0435   INR 1.3*     LDH:    Recent Labs  Lab 07/23/17  1128   *     Microbiology:  Microbiology Results (last 7 days)     ** No results found for the last 168 hours. **          I have reviewed all pertinent labs within the past 24 hours.    Estimated Creatinine Clearance: 43.3 mL/min (based on Cr of 1.6).    Diagnostic Results:  I have reviewed and interpreted all pertinent imaging results/findings within the past 24 hours.

## 2017-07-24 NOTE — PLAN OF CARE
Problem: Patient Care Overview  Goal: Plan of Care Review  Outcome: Ongoing (interventions implemented as appropriate)  No acute events throughout day. IABP no longer oozing.  Planning for LVAD tomorrow. 2 peripheral IVs are placed. Will d/c central line at 0300 when heparin is turned off.  Patient shaved and chlorhexidine bath complete. VS and assessment per flow sheet, patient progressing towards goals as tolerated, plan of care reviewed with Suman Hayden and family, all concerns addressed, will continue to monitor.

## 2017-07-25 ENCOUNTER — ANESTHESIA EVENT (OUTPATIENT)
Dept: INTENSIVE CARE | Facility: HOSPITAL | Age: 67
DRG: 001 | End: 2017-07-25
Payer: MEDICARE

## 2017-07-25 ENCOUNTER — ANESTHESIA (OUTPATIENT)
Dept: INTENSIVE CARE | Facility: HOSPITAL | Age: 67
DRG: 001 | End: 2017-07-25
Payer: MEDICARE

## 2017-07-25 DIAGNOSIS — I50.9 HEART FAILURE, UNSPECIFIED HEART FAILURE CHRONICITY, UNSPECIFIED HEART FAILURE TYPE: Primary | ICD-10-CM

## 2017-07-25 PROBLEM — Z51.5 PALLIATIVE CARE ENCOUNTER: Status: ACTIVE | Noted: 2017-07-25

## 2017-07-25 LAB
ALBUMIN SERPL BCP-MCNC: 3.2 G/DL
ALP SERPL-CCNC: 63 U/L
ALT SERPL W/O P-5'-P-CCNC: 11 U/L
ANION GAP SERPL CALC-SCNC: 14 MMOL/L
AST SERPL-CCNC: 17 U/L
BASOPHILS # BLD AUTO: 0.04 K/UL
BASOPHILS NFR BLD: 0.6 %
BILIRUB SERPL-MCNC: 2 MG/DL
BUN SERPL-MCNC: 29 MG/DL
CALCIUM SERPL-MCNC: 9.6 MG/DL
CHLORIDE SERPL-SCNC: 90 MMOL/L
CO2 SERPL-SCNC: 26 MMOL/L
CREAT SERPL-MCNC: 1.7 MG/DL
DIASTOLIC DYSFUNCTION: YES
DIFFERENTIAL METHOD: ABNORMAL
EOSINOPHIL # BLD AUTO: 0.2 K/UL
EOSINOPHIL NFR BLD: 3.6 %
ERYTHROCYTE [DISTWIDTH] IN BLOOD BY AUTOMATED COUNT: 14.9 %
EST. GFR  (AFRICAN AMERICAN): 47.2 ML/MIN/1.73 M^2
EST. GFR  (NON AFRICAN AMERICAN): 40.8 ML/MIN/1.73 M^2
FACT X PPP CHRO-ACNC: 0.52 IU/ML
FACT X PPP CHRO-ACNC: 0.82 IU/ML
GLUCOSE SERPL-MCNC: 104 MG/DL
HCT VFR BLD AUTO: 41.5 %
HGB BLD-MCNC: 14.6 G/DL
INR PPP: 1.2
LYMPHOCYTES # BLD AUTO: 1.6 K/UL
LYMPHOCYTES NFR BLD: 23.2 %
MAGNESIUM SERPL-MCNC: 2.6 MG/DL
MCH RBC QN AUTO: 29.3 PG
MCHC RBC AUTO-ENTMCNC: 35.2 G/DL
MCV RBC AUTO: 83 FL
METHEMOGLOBIN: 0.8 % (ref 0–3)
MITRAL VALVE REGURGITATION: ABNORMAL
MONOCYTES # BLD AUTO: 1.2 K/UL
MONOCYTES NFR BLD: 17.2 %
NEUTROPHILS # BLD AUTO: 3.7 K/UL
NEUTROPHILS NFR BLD: 55.3 %
PHOSPHATE SERPL-MCNC: 3.7 MG/DL
PLATELET # BLD AUTO: 172 K/UL
PMV BLD AUTO: 11.5 FL
POTASSIUM SERPL-SCNC: 4.1 MMOL/L
PROT SERPL-MCNC: 7.9 G/DL
PROTHROMBIN TIME: 12.5 SEC
RBC # BLD AUTO: 4.99 M/UL
RETIRED EF AND QEF - SEE NOTES: 15 (ref 55–65)
SODIUM SERPL-SCNC: 130 MMOL/L
WBC # BLD AUTO: 6.69 K/UL

## 2017-07-25 PROCEDURE — 99233 SBSQ HOSP IP/OBS HIGH 50: CPT | Mod: ,,, | Performed by: INTERNAL MEDICINE

## 2017-07-25 PROCEDURE — 85520 HEPARIN ASSAY: CPT | Mod: 91

## 2017-07-25 PROCEDURE — 63600175 PHARM REV CODE 636 W HCPCS: Performed by: STUDENT IN AN ORGANIZED HEALTH CARE EDUCATION/TRAINING PROGRAM

## 2017-07-25 PROCEDURE — 27201423 OPTIME MED/SURG SUP & DEVICES STERILE SUPPLY: Performed by: THORACIC SURGERY (CARDIOTHORACIC VASCULAR SURGERY)

## 2017-07-25 PROCEDURE — 93306 TTE W/DOPPLER COMPLETE: CPT | Mod: 26,,, | Performed by: INTERNAL MEDICINE

## 2017-07-25 PROCEDURE — 99499 UNLISTED E&M SERVICE: CPT | Mod: ,,, | Performed by: THORACIC SURGERY (CARDIOTHORACIC VASCULAR SURGERY)

## 2017-07-25 PROCEDURE — 25000003 PHARM REV CODE 250: Performed by: STUDENT IN AN ORGANIZED HEALTH CARE EDUCATION/TRAINING PROGRAM

## 2017-07-25 PROCEDURE — 85025 COMPLETE CBC W/AUTO DIFF WBC: CPT

## 2017-07-25 PROCEDURE — 93312 ECHO TRANSESOPHAGEAL: CPT | Performed by: ANESTHESIOLOGY

## 2017-07-25 PROCEDURE — 25000003 PHARM REV CODE 250: Performed by: INTERNAL MEDICINE

## 2017-07-25 PROCEDURE — 83735 ASSAY OF MAGNESIUM: CPT

## 2017-07-25 PROCEDURE — 85610 PROTHROMBIN TIME: CPT

## 2017-07-25 PROCEDURE — 20000000 HC ICU ROOM

## 2017-07-25 PROCEDURE — 99223 1ST HOSP IP/OBS HIGH 75: CPT | Mod: ,,, | Performed by: PHYSICIAN ASSISTANT

## 2017-07-25 PROCEDURE — C1729 CATH, DRAINAGE: HCPCS | Performed by: THORACIC SURGERY (CARDIOTHORACIC VASCULAR SURGERY)

## 2017-07-25 PROCEDURE — 94799 UNLISTED PULMONARY SVC/PX: CPT

## 2017-07-25 PROCEDURE — 99900035 HC TECH TIME PER 15 MIN (STAT)

## 2017-07-25 PROCEDURE — 80053 COMPREHEN METABOLIC PANEL: CPT

## 2017-07-25 PROCEDURE — 84100 ASSAY OF PHOSPHORUS: CPT

## 2017-07-25 PROCEDURE — 27000202 HC IABP, ADD'L DAY

## 2017-07-25 PROCEDURE — 63600367 HC NITRIC OXIDE PER HOUR

## 2017-07-25 PROCEDURE — 83050 HGB METHEMOGLOBIN QUAN: CPT

## 2017-07-25 PROCEDURE — 85520 HEPARIN ASSAY: CPT

## 2017-07-25 PROCEDURE — A4216 STERILE WATER/SALINE, 10 ML: HCPCS | Performed by: STUDENT IN AN ORGANIZED HEALTH CARE EDUCATION/TRAINING PROGRAM

## 2017-07-25 PROCEDURE — 93306 TTE W/DOPPLER COMPLETE: CPT

## 2017-07-25 RX ADMIN — AMIODARONE HYDROCHLORIDE 400 MG: 200 TABLET ORAL at 08:07

## 2017-07-25 RX ADMIN — AMIODARONE HYDROCHLORIDE 400 MG: 200 TABLET ORAL at 09:07

## 2017-07-25 RX ADMIN — PRAVASTATIN SODIUM 20 MG: 20 TABLET ORAL at 09:07

## 2017-07-25 RX ADMIN — CHOLESTYRAMINE 4 G: 4 POWDER, FOR SUSPENSION ORAL at 11:07

## 2017-07-25 RX ADMIN — MAGNESIUM OXIDE TAB 400 MG (241.3 MG ELEMENTAL MG) 400 MG: 400 (241.3 MG) TAB at 09:07

## 2017-07-25 RX ADMIN — SODIUM CHLORIDE, PRESERVATIVE FREE 3 ML: 5 INJECTION INTRAVENOUS at 10:07

## 2017-07-25 RX ADMIN — SENNOSIDES 8.6 MG: 8.6 TABLET, FILM COATED ORAL at 08:07

## 2017-07-25 RX ADMIN — POLYETHYLENE GLYCOL 3350 17 G: 17 POWDER, FOR SOLUTION ORAL at 08:07

## 2017-07-25 RX ADMIN — DOBUTAMINE HYDROCHLORIDE 5 MCG/KG/MIN: 400 INJECTION INTRAVENOUS at 08:07

## 2017-07-25 RX ADMIN — HEPARIN SODIUM AND DEXTROSE 14 UNITS/KG/HR: 10000; 5 INJECTION INTRAVENOUS at 08:07

## 2017-07-25 RX ADMIN — FAMOTIDINE 20 MG: 20 TABLET, FILM COATED ORAL at 08:07

## 2017-07-25 RX ADMIN — MAGNESIUM OXIDE TAB 400 MG (241.3 MG ELEMENTAL MG) 400 MG: 400 (241.3 MG) TAB at 08:07

## 2017-07-25 RX ADMIN — CHOLESTYRAMINE 4 G: 4 POWDER, FOR SUSPENSION ORAL at 09:07

## 2017-07-25 RX ADMIN — SODIUM CHLORIDE, PRESERVATIVE FREE 3 ML: 5 INJECTION INTRAVENOUS at 06:07

## 2017-07-25 RX ADMIN — SODIUM CHLORIDE, PRESERVATIVE FREE 3 ML: 5 INJECTION INTRAVENOUS at 02:07

## 2017-07-25 RX ADMIN — FUROSEMIDE 15 MG/HR: 10 INJECTION, SOLUTION INTRAVENOUS at 02:07

## 2017-07-25 NOTE — PROGRESS NOTES
Update Note:      SW spoke to pt's wife, sister in law, and niece in waiting room. Pt's family reports coping adequately at this time. Pt's family reports leaving room to allow pt and brother to speak in private. Pt's wife reports pt is also coping adequately at this time. Pt's wife reports she and pt were initially disappointed that LVAD surgery did not happen today, and now they have gotten over their disapointment. Pt's wife reports belief that surgery will happen at the right time. SW canceled BH room for tonight and explained will re book room when surgery is rescheduled. Pt's wife reports understanding. Pt's family reports no other questions, concerns, or needs at this time. SW providing ongoing psychosocial counseling and support, education, assistance, resources, and discharge planning as indicated. SW continuing to follow and remains available.

## 2017-07-25 NOTE — ANESTHESIA PROCEDURE NOTES
GLENN    Diagnosis: heart failure  Patient location during procedure: ICU  Procedure start time: 7/25/2017 7:21 AM  Timeout: 7/25/2017 7:20 AM  Procedure end time: 7/25/2017 7:30 AM  Surgery related to: heart failure  Staffing  Anesthesiologist: CLOVIS SHANKAR  Other anesthesia staff: JET PAPPAS  Performed: anesthesiologist and other anesthesia staff   Preanesthetic Checklist  Completed: patient identified, surgical consent, pre-op evaluation, timeout performed, risks and benefits discussed, monitors and equipment checked, anesthesia consent given, oxygen available, suction available, hand hygiene performed and patient being monitored  Setup & Induction  Probe Insertion: easy  Exam: complete  Exam         LVAD:no  Estimated Ejection Fraction: < 25% severe            Right Heart  Right Ventricle Function: severely decreased    Intra Atrial Septum  PFO: yes by color flow doppler  no IAS aneurysm  no lipomatous hypertrophy      Right Ventricle    Aortic Valve:  Stenosis: none  Morphology: trileaflet  Regurgitation: no aortic valve regurgitation     Mitral Valve:  Morphology:normal  Jet Description: mild    Tricuspid Valve:  Morphology: normal  Regurgitation: none    Pulmonic Valve:  Morphology:normal  Regurgitation(color flow): mild    Great Vessels  IABP: yes  Aorta    Descending aorta IABP: yes    Effusions  Effusions: none    Summary  Findings discussed with surgeon.    Other Findings   Dr. Downing requested bedside GLENN in ICU, he was present for exam  Severely decreased bilateral function with stasis of flow throughout  Mild MR and PI  All valves structurally normal  Small PFO with left to right blood flow  IABP in appropriate position  Patient given 16mg etomidate during procedure

## 2017-07-25 NOTE — ANESTHESIA PREPROCEDURE EVALUATION
Pre-operative evaluation for Procedure(s) (LRB):  CLOSURE-STERNAL WOUND (N/A)  INSERTION-RIGHT VENTRICULAR ASSIST DEVICE (N/A)    Suman Hayden is a 67 y.o. male with PMH of NICM (EF 10%) on home  at 5 mcg/kg/min, esophagitis, HTN, HLD, Hep B, and s/p [employer] CRT-D who presented to the hospital on 07/18/17 after undergoing a 6MWT and developed syncope followed by ICD shocks x 2 (Device interrogation revealed ICD shock x 5 7/14 x1, 7/17 x2, 7/18 x2). Pt underwent RVAD placement 7/27/17 and is scheduled for sternal wound closure tomorrow (7/28/17).  He was consented for both procedures 7/25/17.       IV Access:  IABP 07/18/17   IABP Properties Placement Date: 07/18/17 Insertion Site: Left femoral Assess Remove Now        Peripheral IV - Single Lumen 07/24/17 1630 Left Antecubital   IV Properties Placement Date/Time: 07/24/17 1630 Present Prior to Hospital Arrival?: No IV Change Due: 07/28/17 Size/Length: 20 G Orientation: Left Location: Antecubital Inserted by: RN Insertion attempts (enter comment if more than 2 attempts): 2 Patient ... Assess Remove Now        Peripheral IV - Single Lumen 07/25/17 0930 Left Forearm   IV Properties Placement Date/Time: 07/25/17 0930 Present Prior to Hospital Arrival?: No IV Change Due: 07/29/17 Size/Length: 22 G Orientation: Left Location: Forearm Site Prep: Alcohol Inserted by: RN Insertion attempts (enter comment if more than 2 attemp... Assess Remove Now        Peripheral IV - Single Lumen 07/25/17 2000 Left Wrist   IV Properties Placement Date/Time: 07/25/17 2000 Present Prior to Hospital Arrival?: No Size/Length: 20 G Orientation: Left Location: Wrist Site Prep: Chlorhexidine Inserted by: RN Insertion attempts (enter comment if more than 2 attempts): 2 Patient Tole... Assess Remove Now        Urethral Catheter 07/27/17 0757 Non-latex;Straight-tip;Temperature probe 16 Fr.   Urethral Catheter Properties Placement Date/Time: 07/27/17 0757 Present Prior to Hospital Arrival?: No  Hand Hygiene: Performed Inserted by: RN  Name: Minnie Patel RN Insertion attempts (enter comment if more than 2 attempts): 1 Catheter Type: Non-latex;Straight-ti... Assess Remove Now        Airway - Non-Surgical 07/27/17 0720 Endotracheal Tube   Airway Placement Date/Time: 07/27/17 0720 Present Prior to Hospital Arrival?: No Method of Intubation: Direct laryngoscopy Inserted by: Anesthesia Resident Airway Device: Endotracheal Tube Mask Ventilation: Easy Intubated: Postinduction Blade: Macint... Assess Remove Now        Arterial Line 07/27/17 0716 Left Other (Comment)   Art Line Properties Placement Date/Time: 07/27/17 (c) 0716 Orientation: Left Location: Other (Comment) Site Prep: Chlorhexidine Insertion attempts (enter comment if more than 2 attempts): 1 Patient Tolerance: Tolerated well Assess Remove Now        Percutaneous Central Line Insertion/Assessment - quad lumen  07/27/17 0731 right internal jugular;other (see comments)   Central Line Insertion/Assessment - Properties Group Placement Date/Time: 07/27/17 (c) 0731 Hand Hygiene: Performed Barrier Precautions: Performed Skin Antisepsis: ChloraPrep Location: right internal jugular;other (see comments) Catheter Secured At (cm): 16 Insertion attempts (enter comment if mo... Assess Remove Now        Pulmonary Artery Catheter Assessment  07/27/17 0731   Central Line Insertion/Assessment - Properties Group Placement Date/Time: 07/27/17 (c) 0731 Hand Hygiene: Performed Barrier Precautions: Performed Skin Antisepsis: chlorhexidine (without alcohol) Catheter Secured At (cm): 9 Insertion attempts (enter comment if more than 2 attempts): 2 Patient Amie... Assess Remove Now       Oxygen Requirements:    O2/Vent Data       07/24 0400  Most Recent       SpO2 (%) 96  95     O2 Device (Oxygen Therapy) room air  room air       Infusions:   none    Last Airway: Present Prior to Hospital Arrival?: No; Placement Date: 07/27/17; Placement Time: 0720; Method of Intubation:  Direct laryngoscopy; Inserted by: Anesthesia Resident; Airway Device: Endotracheal Tube; Mask Ventilation: Easy; Intubated: Postinduction; Blade: Shahzad #3; Airway Device Size: 8.0; Style: Cuffed; Cuff Inflation: Minimal occlusive pressure; Placement Verified By: Auscultation, Capnometry, ETT Condensation; Grade: Grade I; Complicating Factors: None; Intubation Findings: Positive EtCO2, Bilateral breath sounds, Atraumatic/Condition of teeth unchanged;  Depth of Insertion: 25; Securment: Lips; Complications: None; Breath Sounds: Equal Bilateral; Insertion Attempts: 1    Patient Active Problem List   Diagnosis    Nonischemic cardiomyopathy    CHF (NYHA class IV, ACC/AHA stage D)    Encounter for monitoring amiodarone therapy    Acute on chronic combined systolic and diastolic heart failure    HTN (hypertension)    Hyperlipidemia    V-tach    Elevated PSA    Hepatitis B core antibody positive since 2012    Acute on chronic systolic heart failure    Acute on chronic heart failure    Heart transplant candidate    Hyperbilirubinemia    AICD discharge    Syncope and collapse    Atrial tachycardia    INDIRA (acute kidney injury)    CHF (congestive heart failure)    Palliative care encounter    Urine culture positive       Review of patient's allergies indicates:  No Known Allergies    Current Facility-Administered Medications on File Prior to Encounter   Medication Dose Route Frequency Provider Last Rate Last Dose    amiodarone tablet 400 mg  400 mg Oral BID Lorena Payton MD   400 mg at 07/26/17 2200    bisacodyl suppository 10 mg  10 mg Rectal Daily PRN Lorena Payton MD   10 mg at 07/19/17 2236    cefepime in dextrose 5 % 1 gram/50 mL IVPB 1 g  1 g Intravenous On Call Procedure Nadia Lucia NP        cholestyramine 4 gram packet 4 g  1 packet Oral BID Angie Torres MD   4 g at 07/26/17 2200    DOBUtamine 1000 mg in D5W 250 mL infusion (premix non-titrating)  5 mcg/kg/min  (Order-Specific) Intravenous Continuous Lroena Payton MD 7.1 mL/hr at 07/27/17 1320 5 mcg/kg/min at 07/27/17 1320    famotidine tablet 20 mg  20 mg Oral Daily Angie Torres MD   20 mg at 07/26/17 0826    fluconazole (DIFLUCAN) IVPB 400 mg 200 mL  400 mg Intravenous On Call Procedure Nadia Lucia NP        furosemide (LASIX) 500 mg in sodium chloride 0.9% 100 mL infusion  15 mg/hr Intravenous Continuous Rai Krishna MD 3 mL/hr at 07/27/17 0600 15 mg/hr at 07/27/17 0600    heparin 25,000 units in dextrose 5% 250 mL (100 units/mL) infusion  17 Units/kg/hr Intravenous Continuous Lorena Payton MD   Stopped at 07/27/17 0302    magnesium oxide tablet 400 mg  400 mg Oral BID Lorena Payton MD   400 mg at 07/26/17 2200    nitric oxide gas Gas 10 ppm  10 ppm Inhalation Continuous Lorena Payton MD        ondansetron injection 8 mg  8 mg Intravenous Q8H PRN Angie Torres MD   8 mg at 07/23/17 0829    pneumococcal vaccine injection 0.5 mL  0.5 mL Intramuscular vaccine x 1 dose MELISSA Heredia        polyethylene glycol packet 17 g  17 g Oral Daily Casey Newman MD   17 g at 07/26/17 0826    pravastatin tablet 20 mg  20 mg Oral QHS Lorena Payton MD   20 mg at 07/26/17 2200    senna tablet 8.6 mg  8.6 mg Oral Daily Casey Newman MD   8.6 mg at 07/26/17 0830    sodium chloride 0.9% flush 3 mL  3 mL Intravenous Q8H Lorena Payton MD   3 mL at 07/27/17 0618    vancomycin (VANCOCIN) 750 mg in dextrose 5 % 250 mL IVPB  750 mg Intravenous On Call Procedure Nadia Lucia, DARLING         Current Outpatient Prescriptions on File Prior to Encounter   Medication Sig Dispense Refill    amiodarone (PACERONE) 200 MG Tab Take by mouth once daily.      aspirin 81 MG Chew Take 81 mg by mouth once daily.      DOBUTamine (DOBUTREX) 1,000 mg/250 mL (4,000 mcg/mL) infusion Inject 414 mcg/min into the vein continuous. 500 mL 11    furosemide (LASIX) 40 MG tablet Take 1 tablet  (40 mg total) by mouth 2 (two) times daily. 180 tablet 3    potassium chloride SA (K-DUR,KLOR-CON) 20 MEQ tablet Take 20 mEq by mouth once daily.       pravastatin (PRAVACHOL) 20 MG tablet Take 20 mg by mouth every evening.      spironolactone (ALDACTONE) 25 MG tablet Take 1 tablet (25 mg total) by mouth once daily. 90 tablet 3       Past Surgical History:   Procedure Laterality Date    CARDIAC CATHETERIZATION      CARDIAC DEFIBRILLATOR PLACEMENT         Social History     Social History    Marital status:      Spouse name: N/A    Number of children: N/A    Years of education: N/A     Occupational History    Not on file.     Social History Main Topics    Smoking status: Never Smoker    Smokeless tobacco: Never Used    Alcohol use No    Drug use: Unknown    Sexual activity: Not on file     Other Topics Concern    Not on file     Social History Narrative    No narrative on file         Vital Signs Range (Last 24H):  Temp:  [36.6 °C (97.9 °F)-36.7 °C (98 °F)]   Pulse:  [59-69]   Resp:  [11-32]   BP: (110-165)/(57-87)   SpO2:  [94 %-100 %]   Arterial Line BP: (101-102)/(74-79)       CBC:   Recent Labs      07/26/17   0354  07/27/17   0640   07/27/17   1048  07/27/17   1117   WBC  6.76  6.10   --    --    --    RBC  4.96  4.63   --    --    --    HGB  14.1  13.1*   --    --    --    HCT  41.8  38.6*   < >  33*  30*   PLT  151  126*   --    --    --    MCV  84  83   --    --    --    MCH  28.4  28.3   --    --    --    MCHC  33.7  33.9   --    --    --     < > = values in this interval not displayed.       CMP:   Recent Labs      07/26/17   0354  07/27/17   0558   NA  129*  129*  130*  130*   K  4.4  4.4  3.8  3.8   CL  93*  93*  91*  91*   CO2  26  26  25  25   BUN  29*  29*  28*  28*   CREATININE  1.4  1.4  1.4  1.4   GLU  101  101  103  103   MG  2.6  2.5   PHOS  3.7  3.4   CALCIUM  8.8  8.8  8.7  8.7   ALBUMIN  2.9*  3.0*  3.0*   PROT  7.4  7.2  7.2   ALKPHOS  58  59  59   ALT   11  11  11   AST  24  19  19   BILITOT  1.9*  2.0*  2.0*       INR  Recent Labs      17   0312  17   0354  17   0316   INR  1.2  1.2  1.2           Diagnostic Studies:      EK/18/17    Test Reason : Z45.02  Vent. Rate : 117 BPM     Atrial Rate : 117 BPM     P-R Int : 000 ms          QRS Dur : 176 ms      QT Int : 246 ms       P-R-T Axes : 000 122 -70 degrees     QTc Int : 343 ms    AV sequential or dual chamber electronic pacemaker  Nonspecific intraventricular block  Abnormal ECG  When compared with ECG of 2017 12:57,  No significant change was found    2D Echo:     Ref Range & Units 1d ago 4d ago 3wk ago     EF 55 - 65 23  15  10     Mitral Valve Regurgitation  MILD TRIVIAL MILD    Est. PA Systolic Pressure  24.6 44.47  62.06     Tricuspid Valve Regurgitation  TRIVIAL  MILD   Resulting Agency  CVIS CVIS CVIS   Narrative            Anesthesia Evaluation    I have reviewed the Patient Summary Reports.     I have reviewed the Medications.     Review of Systems  Anesthesia Hx:  No problems with previous Anesthesia  History of prior surgery of interest to airway management or planning: heart surgery. Previous anesthesia: General Airway issues documented on chart review include mask, easy, easy direct laryngoscopy , view on direct laryngoscopy Grade I  Denies Family Hx of Anesthesia complications.   Denies Personal Hx of Anesthesia complications.   Social:  Non-Smoker    Hematology/Oncology:  Hematology Normal   Oncology Normal     EENT/Dental:EENT/Dental Normal   Cardiovascular:   Pacemaker (placed 2017) Hypertension CHF hyperlipidemia        Pulmonary:  Pulmonary Normal Intubated   Renal/:   Chronic Renal Disease, CRI    Hepatic/GI:  Hepatic/GI Normal    Musculoskeletal:  Musculoskeletal Normal    Neurological:  Neurology Normal    Endocrine:  Endocrine Normal    Psych:  Psychiatric Normal           Physical Exam  General:  Well nourished    Airway/Jaw/Neck:  Airway  Findings: Mouth Opening: Normal Tongue: Large  General Airway Assessment: Adult  Mallampati: III  Improves to II with phonation.  TM Distance: 4 - 6 cm        Eyes/Ears/Nose:  EYES/EARS/NOSE FINDINGS: Normal   Dental:  Dental Findings: Edentulous   Chest/Lungs:  Chest/Lungs Findings: Clear to auscultation, Normal Respiratory Rate     Heart/Vascular:  Heart Findings:    Abdomen:  Abdomen Findings: Normal    Musculoskeletal:  Musculoskeletal Findings: Normal   Skin:  Skin Findings:  Open Wound         Mental Status:  Mental Status Findings: (intubated and sedated)         Anesthesia Plan  Type of Anesthesia, risks & benefits discussed:  Anesthesia Type:  general  Patient's Preference:   Intra-op Monitoring Plan: standard ASA monitors  Intra-op Monitoring Plan Comments:   Post Op Pain Control Plan: multimodal analgesia and IV/PO Opioids PRN  Post Op Pain Control Plan Comments:   Induction:   IV  Beta Blocker:  Patient is not currently on a Beta-Blocker (No further documentation required).       Informed Consent: Patient understands risks and agrees with Anesthesia plan.  Questions answered. Anesthesia consent signed with patient.  ASA Score: 4     Day of Surgery Review of History & Physical: I have interviewed and examined the patient. I have reviewed the patient's H&P dated:  There are no significant changes.  H&P update referred to the surgeon.         Ready For Surgery From Anesthesia Perspective.

## 2017-07-25 NOTE — PLAN OF CARE
Problem: Patient Care Overview  Goal: Plan of Care Review  Outcome: Ongoing (interventions implemented as appropriate)  No acute events throughout day.  LVAD surgery cancelled this AM due to moderately depressed RV on GLENN.  Patient placed on nitric oxide and will repeat another GLENN in the AM and will possibly go to surgery on Thursday.  Heparin restarted and now therapeutic. Otherwise hemodynamically stable. VS and assessment per flow sheet, patient progressing towards goals as tolerated, plan of care reviewed with Suman Hayden and family, all concerns addressed, will continue to monitor.

## 2017-07-25 NOTE — PROGRESS NOTES
Arrhythmia clinic  Pt not going to OR.  ICD changed  from DOO to DDD and tachy therapies restored at bedside.  Pt followed by Dr. Lynch.

## 2017-07-25 NOTE — CONSULTS
[facility]  Palliative Medicine  Consult Note    Patient Name: Suman Hayden  MRN: [id number]  Admission Date: 7/18/2017  Hospital Length of Stay: 7 days  Code Status: Full Code   Attending Provider: Mohit Ambrosio MD  Consulting Provider: Sherman Aguirre PA-C  Primary Care Physician: Joe Ernst MD  Principal Problem:Acute on chronic combined systolic and diastolic heart failure    Patient information was obtained from patient and spouse/SO.      Inpatient consult to Palliative Care  Consult performed by: SHERMAN AGUIRRE  Consult ordered by: MOHIT AMBROSIO  Reason for consult: pre LVAD consult        Assessment/Plan:   Suman Hayden is a 67 y.o. male with NICM (EF 15%) on home , HTN, HLD, esophagitis, s/p ICD who presented to [facility] on 7/18/17 due to syncope with ICD shocks. He is being worked up for LVAD placement.     Palliative care encounter    Pre LVAD Goals of Care Consult Note  Palliative Care    Discussion conducted by Shreman Aguirre PA-C  with the pt and his wife who reported their goals for health care for getting an LVAD are to be more active, more energy, and better quality of life.  The chief concern/fear reported pertaining to receiving an LVAD and the impact on his/her life was it would not improve his quality of life or device failure.     The need for preparedness planning was discussed with special attention and in reference to:  a) device failure b) suboptimal QOL post LVAD procedure, c) impact on co- morbid conditions, and d) catastrophic complications such as stroke, renal failure/hemodialysis or other chronic critical illness. In particular, the following points should be noted in the event of:  1) Device failure: the patient would like all medical interventions possible  a) Post LVAD QOL goals are: more active lifestyle, would like to be able to mow lawn, wash car, visit the sick, and be more active with family   b) Chronic poor QOL is defined as: inability to be active         3)   Catastrophic Complications: device failure, stroke, infection, kidney failure        4)   Co-morbid conditions: HLD, INDIRA      Impression:   Clinical Impression:     During the discussion the patient demonstrated moderate insight/understanding concerning the risk vs benefits of obtaining an LVAD. His wife displays good understanding concerning the risk vs benefits of obtaining an LVAD.    His wife is devoted to providing care post procedure. She will continue to remain by his side during this process. Mr. Hayden has good family support.    Plan/ Recommendations:   The Pre LVAD consultation is complete. Palliative medicine will sign off. Please re consult if assistance is needed in the future. Thank you for the consult.                  Subjective:     HPI:   Suman Hayden is a 67 y.o. male with NICM (EF 15%) on home , HTN, HLD, esophagitis, s/p ICD who presented to [facility] on 7/18/17 due to syncope with ICD shocks. He is being worked up for LVAD placement.     Hospital Course:  No notes on file    Interval History: There were no acute events overnight.     Past Medical History:   Diagnosis Date    Cardiomyopathy     Hyperlipidemia     Hypertension     Obesity     S/P implantation of automatic cardioverter/defibrillator (AICD)     Ventricular tachycardia        Past Surgical History:   Procedure Laterality Date    CARDIAC CATHETERIZATION      CARDIAC DEFIBRILLATOR PLACEMENT         Review of patient's allergies indicates:  No Known Allergies    Medications:  Continuous Infusions:   DOBUTamine 5 mcg/kg/min (07/25/17 1400)    furosemide (LASIX) 5 mg/mL infusion (non-titrating) 15 mg/hr (07/25/17 1400)    heparin (porcine) in D5W 14 Units/kg/hr (07/25/17 1400)    nitric oxide gas       Scheduled Meds:   amiodarone  400 mg Oral BID    cholestyramine  1 packet Oral BID    famotidine  20 mg Oral Daily    magnesium oxide  400 mg Oral BID    polyethylene glycol  17 g Oral Daily    pravastatin  20 mg Oral  QHS    senna  8.6 mg Oral Daily    sodium chloride 0.9%  3 mL Intravenous Q8H     PRN Meds:bisacodyl, ceFEPime (MAXIPIME) IVPB, fluconazole (DIFLUCAN) IVPB, mupirocin, ondansetron, pneumococcal vaccine, vancomycin (VANCOCIN) IVPB    Family History     None        Social History Main Topics    Smoking status: Never Smoker    Smokeless tobacco: Never Used    Alcohol use No    Drug use: Unknown    Sexual activity: Not on file       Review of Systems   Constitutional: Positive for fatigue. Negative for fever.   HENT: Negative for congestion and sneezing.    Eyes: Negative for photophobia and redness.   Respiratory: Positive for shortness of breath. Negative for cough.    Cardiovascular: Positive for leg swelling. Negative for palpitations.   Gastrointestinal: Negative for abdominal pain and constipation.   Musculoskeletal: Negative for gait problem and joint swelling.   Skin: Negative for color change and rash.   Neurological: Negative for seizures and speech difficulty.   Psychiatric/Behavioral: Negative for agitation. The patient is not nervous/anxious.      Objective:     Vital Signs (Most Recent):  Temp: 98 °F (36.7 °C) (07/25/17 1100)  Pulse: (!) 59 (07/25/17 1400)  Resp: (!) 9 (07/25/17 1400)  BP: (!) 146/76 (07/25/17 0701)  SpO2: 100 % (07/25/17 1400) Vital Signs (24h Range):  Temp:  [97.5 °F (36.4 °C)-98.7 °F (37.1 °C)] 98 °F (36.7 °C)  Pulse:  [59-60] 59  Resp:  [9-29] 9  SpO2:  [95 %-100 %] 100 %  BP: (117-149)/(59-91) 146/76     Weight: 77.3 kg (170 lb 6.7 oz)  Body mass index is 25.91 kg/m².    Review of Symptoms  Symptom Assessment (ESAS 0-10 scale)   ESAS 0 1 2 3 4 5 6 7 8 9 10   Pain x             Dyspnea     x         Anxiety x             Nausea x             Depression  x             Anorexia x             Fatigue    x          Insomnia x             Restlessness  x             Agitation x             CAM / Delirium -  Constipation     -   Diarrhea           -  Bowel Management Plan (BMP):  Yes    Comments: The patient denies complaints of pain or discomfort    Pain Assessment: N/A    OME in 24 hours: 0    Performance Status: 50    ECOG Performance Status Grade: 2 - Ambulates, capable of self care only    Physical Exam   Constitutional: He appears well-developed and well-nourished.   HENT:   Head: Normocephalic and atraumatic.   Eyes: Conjunctivae are normal. No scleral icterus.   Cardiovascular: Normal rate and regular rhythm.    Pulmonary/Chest: No respiratory distress.   NC oxygen   Abdominal: Soft. He exhibits no distension.   Neurological: He is alert.   Oriented to person, place and year   Skin: Skin is warm and dry.   Psychiatric: He has a normal mood and affect. His behavior is normal.   Nursing note and vitals reviewed.      Significant Labs: All pertinent labs within the past 24 hours have been reviewed.  CBC:     Recent Labs  Lab 07/25/17 0311   WBC 6.69   HGB 14.6   HCT 41.5   MCV 83        BMP:    Recent Labs  Lab 07/25/17 0311     104  104  104   *  130*  130*  130*   K 4.1  4.1  4.1  4.1   CL 90*  90*  90*  90*   CO2 26  26  26  26   BUN 29*  29*  29*  29*   CREATININE 1.7*  1.7*  1.7*  1.7*   CALCIUM 9.6  9.6  9.6  9.6   MG 2.6     LFT:  Lab Results   Component Value Date    AST 17 07/25/2017    ALKPHOS 63 07/25/2017    BILITOT 2.0 (H) 07/25/2017     Albumin:   Albumin   Date Value Ref Range Status   07/25/2017 3.2 (L) 3.5 - 5.2 g/dL Final     Protein:   Total Protein   Date Value Ref Range Status   07/25/2017 7.9 6.0 - 8.4 g/dL Final     Lactic acid:   Lab Results   Component Value Date    LACTATE 3.5 (HH) 07/18/2017       Significant Imaging: I have reviewed all pertinent imaging results/findings within the past 24 hours.    Advanced Directives::  Living Will: No  LaPOST: No  Do Not Resuscitate Status: No  Medical Power of : No. Wife is next of kin    Decision-Making Capacity: Patient answered questions, Family answered  questions    Living Arrangements: Lives with spouse    Psychosocial/Cultural:  Mr. Hayden has been  to his wife for 45 years. They have 2 daughters, 1 son, 7 grandchildren, and 4 great grandchildren. He was born and raised in [place], [place]. He worked as a  for the city before retiring. Hobbies include spending time with family.     Spiritual:     F- Laura and Belief: Bahai    I - Importance: attends regular service  .  C - Community: no mention of community involvement    A - Address in Care: will assess if spiritual care can be of assistance      > 50% of 75 min visit spent in chart review, face to face discussion of goals of care,  symptom assessment, coordination of care and emotional support.    ELIJAH PalmerC  Palliative Medicine  [facility]

## 2017-07-25 NOTE — HPI
Suman Hayden is a 67 y.o. male with NICM (EF 15%) on home , HTN, HLD, esophagitis, s/p ICD who presented to [facility] on 7/18/17 due to syncope with ICD shocks. He is being worked up for LVAD placement.

## 2017-07-25 NOTE — ASSESSMENT & PLAN NOTE
- c/w  at 5 mcg/kg/min  - c/w lasix 15 mg/hr  - IABP 1:1 support with diastolic augmentation 140s  - CXR reviewed which reveals stable positioning  - D/c delgado catheter, [facility] TLC removed 7/25/17  - NO 10 PPM started today  - TTE tomorrow (RV dedicated)  - LVAD sx possible Thursday

## 2017-07-25 NOTE — SUBJECTIVE & OBJECTIVE
Interval History: There were no acute events overnight.     Past Medical History:   Diagnosis Date    Cardiomyopathy     Hyperlipidemia     Hypertension     Obesity     S/P implantation of automatic cardioverter/defibrillator (AICD)     Ventricular tachycardia        Past Surgical History:   Procedure Laterality Date    CARDIAC CATHETERIZATION      CARDIAC DEFIBRILLATOR PLACEMENT         Review of patient's allergies indicates:  No Known Allergies    Medications:  Continuous Infusions:   DOBUTamine 5 mcg/kg/min (07/25/17 1400)    furosemide (LASIX) 5 mg/mL infusion (non-titrating) 15 mg/hr (07/25/17 1400)    heparin (porcine) in D5W 14 Units/kg/hr (07/25/17 1400)    nitric oxide gas       Scheduled Meds:   amiodarone  400 mg Oral BID    cholestyramine  1 packet Oral BID    famotidine  20 mg Oral Daily    magnesium oxide  400 mg Oral BID    polyethylene glycol  17 g Oral Daily    pravastatin  20 mg Oral QHS    senna  8.6 mg Oral Daily    sodium chloride 0.9%  3 mL Intravenous Q8H     PRN Meds:bisacodyl, ceFEPime (MAXIPIME) IVPB, fluconazole (DIFLUCAN) IVPB, mupirocin, ondansetron, pneumococcal vaccine, vancomycin (VANCOCIN) IVPB    Family History     None        Social History Main Topics    Smoking status: Never Smoker    Smokeless tobacco: Never Used    Alcohol use No    Drug use: Unknown    Sexual activity: Not on file       Review of Systems   Constitutional: Positive for fatigue. Negative for fever.   HENT: Negative for congestion and sneezing.    Eyes: Negative for photophobia and redness.   Respiratory: Positive for shortness of breath. Negative for cough.    Cardiovascular: Positive for leg swelling. Negative for palpitations.   Gastrointestinal: Negative for abdominal pain and constipation.   Musculoskeletal: Negative for gait problem and joint swelling.   Skin: Negative for color change and rash.   Neurological: Negative for seizures and speech difficulty.   Psychiatric/Behavioral:  Negative for agitation. The patient is not nervous/anxious.      Objective:     Vital Signs (Most Recent):  Temp: 98 °F (36.7 °C) (07/25/17 1100)  Pulse: (!) 59 (07/25/17 1400)  Resp: (!) 9 (07/25/17 1400)  BP: (!) 146/76 (07/25/17 0701)  SpO2: 100 % (07/25/17 1400) Vital Signs (24h Range):  Temp:  [97.5 °F (36.4 °C)-98.7 °F (37.1 °C)] 98 °F (36.7 °C)  Pulse:  [59-60] 59  Resp:  [9-29] 9  SpO2:  [95 %-100 %] 100 %  BP: (117-149)/(59-91) 146/76     Weight: 77.3 kg (170 lb 6.7 oz)  Body mass index is 25.91 kg/m².    Review of Symptoms  Symptom Assessment (ESAS 0-10 scale)   ESAS 0 1 2 3 4 5 6 7 8 9 10   Pain x             Dyspnea     x         Anxiety x             Nausea x             Depression  x             Anorexia x             Fatigue    x          Insomnia x             Restlessness  x             Agitation x             CAM / Delirium -  Constipation     -   Diarrhea           -  Bowel Management Plan (BMP): Yes    Comments: The patient denies complaints of pain or discomfort    Pain Assessment: N/A    OME in 24 hours: 0    Performance Status: 50    ECOG Performance Status Grade: 2 - Ambulates, capable of self care only    Physical Exam   Constitutional: He appears well-developed and well-nourished.   HENT:   Head: Normocephalic and atraumatic.   Eyes: Conjunctivae are normal. No scleral icterus.   Cardiovascular: Normal rate and regular rhythm.    Pulmonary/Chest: No respiratory distress.   NC oxygen   Abdominal: Soft. He exhibits no distension.   Neurological: He is alert.   Oriented to person, place and year   Skin: Skin is warm and dry.   Psychiatric: He has a normal mood and affect. His behavior is normal.   Nursing note and vitals reviewed.      Significant Labs: All pertinent labs within the past 24 hours have been reviewed.  CBC:     Recent Labs  Lab 07/25/17  0311   WBC 6.69   HGB 14.6   HCT 41.5   MCV 83        BMP:    Recent Labs  Lab 07/25/17  0311     104  104  104   *   130*  130*  130*   K 4.1  4.1  4.1  4.1   CL 90*  90*  90*  90*   CO2 26  26  26  26   BUN 29*  29*  29*  29*   CREATININE 1.7*  1.7*  1.7*  1.7*   CALCIUM 9.6  9.6  9.6  9.6   MG 2.6     LFT:  Lab Results   Component Value Date    AST 17 07/25/2017    ALKPHOS 63 07/25/2017    BILITOT 2.0 (H) 07/25/2017     Albumin:   Albumin   Date Value Ref Range Status   07/25/2017 3.2 (L) 3.5 - 5.2 g/dL Final     Protein:   Total Protein   Date Value Ref Range Status   07/25/2017 7.9 6.0 - 8.4 g/dL Final     Lactic acid:   Lab Results   Component Value Date    LACTATE 3.5 (HH) 07/18/2017       Significant Imaging: I have reviewed all pertinent imaging results/findings within the past 24 hours.    Advanced Directives::  Living Will: No  LaPOST: No  Do Not Resuscitate Status: No  Medical Power of : No. Wife is next of kin    Decision-Making Capacity: Patient answered questions, Family answered questions    Living Arrangements: Lives with spouse    Psychosocial/Cultural:  Mr. Hayden has been  to his wife for 45 years. They have 2 daughters, 1 son, 7 grandchildren, and 4 great grandchildren. He was born and raised in [place], [place]. He worked as a  for the city before retiring. Hobbies include spending time with family.     Spiritual:     F- Laura and Belief: Jew    I - Importance: attends regular service  .  C - Community: no mention of community involvement    A - Address in Care: will assess if spiritual care can be of assistance

## 2017-07-25 NOTE — SUBJECTIVE & OBJECTIVE
Interval History: NAEON. Feels well. LVAD sx cancelled due to moderate RV dysfunction. Case discussed with Dr. Downing who recommended to start NO and reassess TTE in AM. If RV function improves, may consider LVAD sx on Thursday.    Continuous Infusions:   DOBUTamine 5 mcg/kg/min (07/25/17 1400)    furosemide (LASIX) 5 mg/mL infusion (non-titrating) 15 mg/hr (07/25/17 1400)    heparin (porcine) in D5W 14 Units/kg/hr (07/25/17 1400)    nitric oxide gas       Scheduled Meds:   amiodarone  400 mg Oral BID    cholestyramine  1 packet Oral BID    famotidine  20 mg Oral Daily    magnesium oxide  400 mg Oral BID    polyethylene glycol  17 g Oral Daily    pravastatin  20 mg Oral QHS    senna  8.6 mg Oral Daily    sodium chloride 0.9%  3 mL Intravenous Q8H     PRN Meds:bisacodyl, ceFEPime (MAXIPIME) IVPB, fluconazole (DIFLUCAN) IVPB, mupirocin, ondansetron, pneumococcal vaccine, vancomycin (VANCOCIN) IVPB    Review of patient's allergies indicates:  No Known Allergies  Objective:     Vital Signs (Most Recent):  Temp: 98 °F (36.7 °C) (07/25/17 1100)  Pulse: (!) 59 (07/25/17 1400)  Resp: (!) 9 (07/25/17 1400)  BP: (!) 146/76 (07/25/17 0701)  SpO2: 100 % (07/25/17 1400) Vital Signs (24h Range):  Temp:  [97.5 °F (36.4 °C)-98.7 °F (37.1 °C)] 98 °F (36.7 °C)  Pulse:  [59-60] 59  Resp:  [9-29] 9  SpO2:  [95 %-100 %] 100 %  BP: (117-149)/(59-91) 146/76     Weight: 77.3 kg (170 lb 6.7 oz)  Body mass index is 25.91 kg/m².      Intake/Output Summary (Last 24 hours) at 07/25/17 1441  Last data filed at 07/25/17 1400   Gross per 24 hour   Intake            793.9 ml   Output             1140 ml   Net           -346.1 ml       Hemodynamic Parameters:       Telemetry: no events    Physical Exam   Constitutional: NAD, conversant  HEENT: Sclera anicteric, PERRLA, EOMI  Neck: Positive JVD up to angle of jaw, no carotid bruits  CV: RRR, no murmur, normal S1/S2, +S3, No Pericardial rub  Pulm: CTAB with no wheezes, rales, or  rhonchi  GI: Abdomen soft, NTND, +BS  Extremities: No LE edema, warm and well perfused, No cyanosis, No clubbing, left groin IABP in place w/o evidence of hematoma  Skin: No ecchymosis, erythema, or ulcers  Psych: AOx3, appropriate affect  Neuro: CNII-XII intact, no focal deficits       Significant Labs:  CBC:    Recent Labs  Lab 07/25/17  0311   WBC 6.69   RBC 4.99   HGB 14.6   HCT 41.5      MCV 83   MCH 29.3   MCHC 35.2     BNP:    Recent Labs  Lab 07/24/17  0420   *     CMP:    Recent Labs  Lab 07/25/17  0311     104  104  104   CALCIUM 9.6  9.6  9.6  9.6   ALBUMIN 3.2*   PROT 7.9   *  130*  130*  130*   K 4.1  4.1  4.1  4.1   CO2 26  26  26  26   CL 90*  90*  90*  90*   BUN 29*  29*  29*  29*   CREATININE 1.7*  1.7*  1.7*  1.7*   ALKPHOS 63   ALT 11   AST 17   BILITOT 2.0*      Coagulation:     Recent Labs  Lab 07/25/17  0312   INR 1.2     LDH:    Recent Labs  Lab 07/23/17  1128   *     Microbiology:  Microbiology Results (last 7 days)     Procedure Component Value Units Date/Time    Blood culture [[id number]] Collected:  07/24/17 1100    Order Status:  Completed Specimen:  Blood from Peripheral, Antecubital, Left Updated:  07/25/17 1412     Blood Culture, Routine No Growth to date     Blood Culture, Routine No Growth to date    Blood culture [[id number]] Collected:  07/24/17 1300    Order Status:  Completed Specimen:  Blood from Peripheral, Hand, Right Updated:  07/25/17 0115     Blood Culture, Routine No Growth to date    Urine culture [[id number]] Collected:  07/24/17 1139    Order Status:  Sent Specimen:  Urine from Urine, Clean Catch Updated:  07/24/17 1308          I have reviewed all pertinent labs within the past 24 hours.    Estimated Creatinine Clearance: 40.8 mL/min (based on Cr of 1.7).    Diagnostic Results:  I have reviewed and interpreted all pertinent imaging results/findings within the past 24 hours.

## 2017-07-25 NOTE — PLAN OF CARE
Problem: Patient Care Overview  Goal: Plan of Care Review  Outcome: Ongoing (interventions implemented as appropriate)  VSS stable within the shift. Conscious and coherent. On Paced Rhythm. AICD is subject to be turned off prior to end of shift. For VAD surgery this day. IABP settings maintained at following settings: 1:1, Auto and EKG trigger. Heparin drip on hold since 3am. Central line removed. Lasix infusion maintained at 15mg/hr. Dobutamine infusion maintained at 5mcg/kg/hr. No SOB noted. Tolerates room air. O2 saturations were 95% on intermittent monitoring. Patient was maintained on NPO since midnight. No BM within the shift. Noted to be voiding freely. Pre-op preparations facilitated. POC discussed with patient and wife. Will continue to monitor.

## 2017-07-25 NOTE — ASSESSMENT & PLAN NOTE
Pre LVAD Goals of Care Consult Note  Palliative Care    Discussion conducted by Abby Aguirre Pa-C  with the pt and his wife who reported their goals for health care for getting an LVAD are to be more active, more energy, and better quality of life.  The chief concern/fear reported pertaining to receiving an LVAD and the impact on his/her life was it would not improve his quality of life or device failure.     The need for preparedness planning was discussed with special attention and in reference to:  a) device failure b) suboptimal QOL post LVAD procedure, c) impact on co- morbid conditions, and d) catastrophic complications such as stroke, renal failure/hemodialysis or other chronic critical illness. In particular, the following points should be noted in the event of:  1) Device failure: the patient would like all medical interventions possible  a) Post LVAD QOL goals are: more active lifestyle, would like to be able to mow lawn, wash car, visit the sick, and be more active with family   b) Chronic poor QOL is defined as: inability to be active        3)   Catastrophic Complications: device failure, stroke, infection, kidney failure        4)   Co-morbid conditions: HLD, INDIRA      Impression:   Clinical Impression:     During the discussion the patient demonstrated moderate insight/understanding concerning the risk vs benefits of obtaining an LVAD. His wife displays good understanding concerning the risk vs benefits of obtaining an LVAD.    His wife is devoted to providing care post procedure. She will continue to remain by his side during this process. Mr. Hayden has good family support.    Plan/ Recommendations:   The Pre LVAD consultation is complete. Palliative medicine will sign off. Please re consult if assistance is needed in the future.

## 2017-07-25 NOTE — PROGRESS NOTES
Pt and family AAAO, wife not at bedside. Pt reports he is a little disappointed about not going today, but he hopes to go tomorrow.  No questions for me at this time.

## 2017-07-25 NOTE — PROGRESS NOTES
[facility]  Heart Transplant  Progress Note    Patient Name: Suman Hayden  MRN: [id number]  Admission Date: 7/18/2017  Hospital Length of Stay: 7 days  Attending Physician: Mohit Ambrosio MD  Primary Care Provider: Joe Ernst MD  Principal Problem:Acute on chronic combined systolic and diastolic heart failure    Subjective:     Interval History: NAEON. Feels well. LVAD sx cancelled due to moderate RV dysfunction. Case discussed with Dr. Downing who recommended to start NO and reassess TTE in AM. If RV function improves, may consider LVAD sx on Thursday.    Continuous Infusions:   DOBUTamine 5 mcg/kg/min (07/25/17 1400)    furosemide (LASIX) 5 mg/mL infusion (non-titrating) 15 mg/hr (07/25/17 1400)    heparin (porcine) in D5W 14 Units/kg/hr (07/25/17 1400)    nitric oxide gas       Scheduled Meds:   amiodarone  400 mg Oral BID    cholestyramine  1 packet Oral BID    famotidine  20 mg Oral Daily    magnesium oxide  400 mg Oral BID    polyethylene glycol  17 g Oral Daily    pravastatin  20 mg Oral QHS    senna  8.6 mg Oral Daily    sodium chloride 0.9%  3 mL Intravenous Q8H     PRN Meds:bisacodyl, ceFEPime (MAXIPIME) IVPB, fluconazole (DIFLUCAN) IVPB, mupirocin, ondansetron, pneumococcal vaccine, vancomycin (VANCOCIN) IVPB    Review of patient's allergies indicates:  No Known Allergies  Objective:     Vital Signs (Most Recent):  Temp: 98 °F (36.7 °C) (07/25/17 1100)  Pulse: (!) 59 (07/25/17 1400)  Resp: (!) 9 (07/25/17 1400)  BP: (!) 146/76 (07/25/17 0701)  SpO2: 100 % (07/25/17 1400) Vital Signs (24h Range):  Temp:  [97.5 °F (36.4 °C)-98.7 °F (37.1 °C)] 98 °F (36.7 °C)  Pulse:  [59-60] 59  Resp:  [9-29] 9  SpO2:  [95 %-100 %] 100 %  BP: (117-149)/(59-91) 146/76     Weight: 77.3 kg (170 lb 6.7 oz)  Body mass index is 25.91 kg/m².      Intake/Output Summary (Last 24 hours) at 07/25/17 1441  Last data filed at 07/25/17 1400   Gross per 24 hour   Intake            793.9 ml   Output              1140 ml   Net           -346.1 ml       Hemodynamic Parameters:       Telemetry: no events    Physical Exam   Constitutional: NAD, conversant  HEENT: Sclera anicteric, PERRLA, EOMI  Neck: Positive JVD up to angle of jaw, no carotid bruits  CV: RRR, no murmur, normal S1/S2, +S3, No Pericardial rub  Pulm: CTAB with no wheezes, rales, or rhonchi  GI: Abdomen soft, NTND, +BS  Extremities: No LE edema, warm and well perfused, No cyanosis, No clubbing, left groin IABP in place w/o evidence of hematoma  Skin: No ecchymosis, erythema, or ulcers  Psych: AOx3, appropriate affect  Neuro: CNII-XII intact, no focal deficits       Significant Labs:  CBC:    Recent Labs  Lab 07/25/17 0311   WBC 6.69   RBC 4.99   HGB 14.6   HCT 41.5      MCV 83   MCH 29.3   MCHC 35.2     BNP:    Recent Labs  Lab 07/24/17  0420   *     CMP:    Recent Labs  Lab 07/25/17  0311     104  104  104   CALCIUM 9.6  9.6  9.6  9.6   ALBUMIN 3.2*   PROT 7.9   *  130*  130*  130*   K 4.1  4.1  4.1  4.1   CO2 26  26  26  26   CL 90*  90*  90*  90*   BUN 29*  29*  29*  29*   CREATININE 1.7*  1.7*  1.7*  1.7*   ALKPHOS 63   ALT 11   AST 17   BILITOT 2.0*      Coagulation:     Recent Labs  Lab 07/25/17  0312   INR 1.2     LDH:    Recent Labs  Lab 07/23/17  1128   *     Microbiology:  Microbiology Results (last 7 days)     Procedure Component Value Units Date/Time    Blood culture [[id number]] Collected:  07/24/17 1100    Order Status:  Completed Specimen:  Blood from Peripheral, Antecubital, Left Updated:  07/25/17 1412     Blood Culture, Routine No Growth to date     Blood Culture, Routine No Growth to date    Blood culture [[id number]] Collected:  07/24/17 1300    Order Status:  Completed Specimen:  Blood from Peripheral, Hand, Right Updated:  07/25/17 0115     Blood Culture, Routine No Growth to date    Urine culture [[id number]] Collected:  07/24/17 1139    Order Status:  Sent Specimen:  Urine from  Urine, Clean Catch Updated:  07/24/17 1308          I have reviewed all pertinent labs within the past 24 hours.    Estimated Creatinine Clearance: 40.8 mL/min (based on Cr of 1.7).    Diagnostic Results:  I have reviewed and interpreted all pertinent imaging results/findings within the past 24 hours.    Assessment and Plan:     * Acute on chronic combined systolic and diastolic heart failure    - c/w  at 5 mcg/kg/min  - c/w lasix 15 mg/hr  - IABP 1:1 support with diastolic augmentation 140s  - CXR reviewed which reveals stable positioning  - D/c delgado catheter, [facility] TLC removed 7/25/17  - NO 10 PPM started today  - TTE tomorrow (RV dedicated)  - LVAD sx possible Thursday        AICD discharge    - appropriate in the setting of VT aggravated by underlying AT/AFL        V-tach    - amiodarone 400 mg PO BID until 8/2/17 the 400 mg QD  - monitor lytes; keep K>4.0, Mg>2.0  - appreciate EP recs        Atrial tachycardia    - [id number] - 3  - c/w amiodarone  - c/w heparin gtt        INDIRA (acute kidney injury)    - pre-renal due to hypervolemia  - c/w lasix gtt  - improving  - avoid nephrotoxic agents        Hepatitis B core antibody positive since 2012    - will defer liver biopsy as CTS not requiring it  - ID consult in place for clearance for VAD sx  - no evidence of liver failure  - TBILI trending down  - AST/ALT WNL  - case discussed with hepatology, low suspicion for liver involvement        Hyperlipidemia    - c/w pravastatin            Lorena Payton MD  Heart Transplant  [facility]

## 2017-07-26 ENCOUNTER — CONFERENCE (OUTPATIENT)
Dept: TRANSPLANT | Facility: CLINIC | Age: 67
End: 2017-07-26

## 2017-07-26 ENCOUNTER — ANESTHESIA (OUTPATIENT)
Dept: SURGERY | Facility: HOSPITAL | Age: 67
DRG: 001 | End: 2017-07-26
Payer: MEDICARE

## 2017-07-26 ENCOUNTER — ANESTHESIA EVENT (OUTPATIENT)
Dept: SURGERY | Facility: HOSPITAL | Age: 67
DRG: 001 | End: 2017-07-26
Payer: MEDICARE

## 2017-07-26 PROBLEM — R82.79 URINE CULTURE POSITIVE: Status: ACTIVE | Noted: 2017-07-26

## 2017-07-26 LAB
ALBUMIN SERPL BCP-MCNC: 2.9 G/DL
ALP SERPL-CCNC: 58 U/L
ALT SERPL W/O P-5'-P-CCNC: 11 U/L
ANION GAP SERPL CALC-SCNC: 10 MMOL/L
ANION GAP SERPL CALC-SCNC: 10 MMOL/L
AST SERPL-CCNC: 24 U/L
BASOPHILS # BLD AUTO: 0.04 K/UL
BASOPHILS NFR BLD: 0.6 %
BILIRUB SERPL-MCNC: 1.9 MG/DL
BLD PROD TYP BPU: NORMAL
BLOOD UNIT EXPIRATION DATE: NORMAL
BLOOD UNIT TYPE CODE: 5100
BLOOD UNIT TYPE: NORMAL
BUN SERPL-MCNC: 29 MG/DL
BUN SERPL-MCNC: 29 MG/DL
CALCIUM SERPL-MCNC: 8.8 MG/DL
CALCIUM SERPL-MCNC: 8.8 MG/DL
CHLORIDE SERPL-SCNC: 93 MMOL/L
CHLORIDE SERPL-SCNC: 93 MMOL/L
CO2 SERPL-SCNC: 26 MMOL/L
CO2 SERPL-SCNC: 26 MMOL/L
CODING SYSTEM: NORMAL
CREAT SERPL-MCNC: 1.4 MG/DL
CREAT SERPL-MCNC: 1.4 MG/DL
DIFFERENTIAL METHOD: ABNORMAL
DISPENSE STATUS: NORMAL
EOSINOPHIL # BLD AUTO: 0.3 K/UL
EOSINOPHIL NFR BLD: 3.7 %
ERYTHROCYTE [DISTWIDTH] IN BLOOD BY AUTOMATED COUNT: 15 %
EST. GFR  (AFRICAN AMERICAN): 59.7 ML/MIN/1.73 M^2
EST. GFR  (AFRICAN AMERICAN): 59.7 ML/MIN/1.73 M^2
EST. GFR  (NON AFRICAN AMERICAN): 51.6 ML/MIN/1.73 M^2
EST. GFR  (NON AFRICAN AMERICAN): 51.6 ML/MIN/1.73 M^2
ESTIMATED PA SYSTOLIC PRESSURE: 20.43
FACT X PPP CHRO-ACNC: 0.55 IU/ML
GLUCOSE SERPL-MCNC: 101 MG/DL
GLUCOSE SERPL-MCNC: 101 MG/DL
HCT VFR BLD AUTO: 41.8 %
HGB BLD-MCNC: 14.1 G/DL
INR PPP: 1.2
LYMPHOCYTES # BLD AUTO: 1.3 K/UL
LYMPHOCYTES NFR BLD: 19.7 %
MAGNESIUM SERPL-MCNC: 2.6 MG/DL
MCH RBC QN AUTO: 28.4 PG
MCHC RBC AUTO-ENTMCNC: 33.7 G/DL
MCV RBC AUTO: 84 FL
METHEMOGLOBIN: 0.5 % (ref 0–3)
METHEMOGLOBIN: 0.6 % (ref 0–3)
MONOCYTES # BLD AUTO: 1.2 K/UL
MONOCYTES NFR BLD: 17.5 %
NEUTROPHILS # BLD AUTO: 3.9 K/UL
NEUTROPHILS NFR BLD: 58.2 %
NUM UNITS TRANS FFP: NORMAL
PHOSPHATE SERPL-MCNC: 3.7 MG/DL
PLATELET # BLD AUTO: 151 K/UL
PMV BLD AUTO: 11.7 FL
POTASSIUM SERPL-SCNC: 4.4 MMOL/L
POTASSIUM SERPL-SCNC: 4.4 MMOL/L
PROT SERPL-MCNC: 7.4 G/DL
PROTHROMBIN TIME: 12.3 SEC
RBC # BLD AUTO: 4.96 M/UL
SODIUM SERPL-SCNC: 129 MMOL/L
SODIUM SERPL-SCNC: 129 MMOL/L
WBC # BLD AUTO: 6.76 K/UL

## 2017-07-26 PROCEDURE — 25000003 PHARM REV CODE 250: Performed by: INTERNAL MEDICINE

## 2017-07-26 PROCEDURE — 84100 ASSAY OF PHOSPHORUS: CPT

## 2017-07-26 PROCEDURE — 99233 SBSQ HOSP IP/OBS HIGH 50: CPT | Mod: ,,, | Performed by: INTERNAL MEDICINE

## 2017-07-26 PROCEDURE — 27000221 HC OXYGEN, UP TO 24 HOURS

## 2017-07-26 PROCEDURE — 94799 UNLISTED PULMONARY SVC/PX: CPT

## 2017-07-26 PROCEDURE — 27000202 HC IABP, ADD'L DAY

## 2017-07-26 PROCEDURE — 20000000 HC ICU ROOM

## 2017-07-26 PROCEDURE — 99223 1ST HOSP IP/OBS HIGH 75: CPT | Mod: ,,, | Performed by: INTERNAL MEDICINE

## 2017-07-26 PROCEDURE — 83050 HGB METHEMOGLOBIN QUAN: CPT

## 2017-07-26 PROCEDURE — 85025 COMPLETE CBC W/AUTO DIFF WBC: CPT

## 2017-07-26 PROCEDURE — 93306 TTE W/DOPPLER COMPLETE: CPT | Mod: 26,,, | Performed by: INTERNAL MEDICINE

## 2017-07-26 PROCEDURE — 63600367 HC NITRIC OXIDE PER HOUR

## 2017-07-26 PROCEDURE — 63600175 PHARM REV CODE 636 W HCPCS: Performed by: NURSE PRACTITIONER

## 2017-07-26 PROCEDURE — 25000003 PHARM REV CODE 250: Performed by: STUDENT IN AN ORGANIZED HEALTH CARE EDUCATION/TRAINING PROGRAM

## 2017-07-26 PROCEDURE — 63600175 PHARM REV CODE 636 W HCPCS: Performed by: STUDENT IN AN ORGANIZED HEALTH CARE EDUCATION/TRAINING PROGRAM

## 2017-07-26 PROCEDURE — 85520 HEPARIN ASSAY: CPT

## 2017-07-26 PROCEDURE — 83735 ASSAY OF MAGNESIUM: CPT

## 2017-07-26 PROCEDURE — 99900035 HC TECH TIME PER 15 MIN (STAT)

## 2017-07-26 PROCEDURE — 94761 N-INVAS EAR/PLS OXIMETRY MLT: CPT

## 2017-07-26 PROCEDURE — A4216 STERILE WATER/SALINE, 10 ML: HCPCS | Performed by: STUDENT IN AN ORGANIZED HEALTH CARE EDUCATION/TRAINING PROGRAM

## 2017-07-26 PROCEDURE — 85610 PROTHROMBIN TIME: CPT

## 2017-07-26 PROCEDURE — 80053 COMPREHEN METABOLIC PANEL: CPT

## 2017-07-26 PROCEDURE — 25000003 PHARM REV CODE 250: Performed by: NURSE PRACTITIONER

## 2017-07-26 PROCEDURE — 93308 TTE F-UP OR LMTD: CPT

## 2017-07-26 RX ORDER — FLUCONAZOLE 2 MG/ML
400 INJECTION, SOLUTION INTRAVENOUS
Status: DISCONTINUED | OUTPATIENT
Start: 2017-07-27 | End: 2017-07-27

## 2017-07-26 RX ORDER — MUPIROCIN 20 MG/G
1 OINTMENT TOPICAL
Status: DISCONTINUED | OUTPATIENT
Start: 2017-07-26 | End: 2017-07-27 | Stop reason: HOSPADM

## 2017-07-26 RX ORDER — CEFEPIME HYDROCHLORIDE 1 G/50ML
1 INJECTION, SOLUTION INTRAVENOUS
Status: DISCONTINUED | OUTPATIENT
Start: 2017-07-27 | End: 2017-07-27

## 2017-07-26 RX ORDER — ACETAMINOPHEN 325 MG/1
650 TABLET ORAL ONCE
Status: COMPLETED | OUTPATIENT
Start: 2017-07-26 | End: 2017-07-26

## 2017-07-26 RX ADMIN — SODIUM CHLORIDE, PRESERVATIVE FREE 3 ML: 5 INJECTION INTRAVENOUS at 10:07

## 2017-07-26 RX ADMIN — PRAVASTATIN SODIUM 20 MG: 20 TABLET ORAL at 10:07

## 2017-07-26 RX ADMIN — AMIODARONE HYDROCHLORIDE 400 MG: 200 TABLET ORAL at 08:07

## 2017-07-26 RX ADMIN — MAGNESIUM OXIDE TAB 400 MG (241.3 MG ELEMENTAL MG) 400 MG: 400 (241.3 MG) TAB at 08:07

## 2017-07-26 RX ADMIN — HEPARIN SODIUM AND DEXTROSE 14 UNITS/KG/HR: 10000; 5 INJECTION INTRAVENOUS at 11:07

## 2017-07-26 RX ADMIN — MAGNESIUM OXIDE TAB 400 MG (241.3 MG ELEMENTAL MG) 400 MG: 400 (241.3 MG) TAB at 10:07

## 2017-07-26 RX ADMIN — POLYETHYLENE GLYCOL 3350 17 G: 17 POWDER, FOR SOLUTION ORAL at 08:07

## 2017-07-26 RX ADMIN — AMIODARONE HYDROCHLORIDE 400 MG: 200 TABLET ORAL at 10:07

## 2017-07-26 RX ADMIN — SODIUM CHLORIDE, PRESERVATIVE FREE 3 ML: 5 INJECTION INTRAVENOUS at 01:07

## 2017-07-26 RX ADMIN — PHYTONADIONE 10 MG: 10 INJECTION, EMULSION INTRAMUSCULAR; INTRAVENOUS; SUBCUTANEOUS at 05:07

## 2017-07-26 RX ADMIN — ACETAMINOPHEN 650 MG: 325 TABLET ORAL at 01:07

## 2017-07-26 RX ADMIN — SENNOSIDES 8.6 MG: 8.6 TABLET, FILM COATED ORAL at 08:07

## 2017-07-26 RX ADMIN — PHYTONADIONE 10 MG: 10 INJECTION, EMULSION INTRAMUSCULAR; INTRAVENOUS; SUBCUTANEOUS at 11:07

## 2017-07-26 RX ADMIN — FUROSEMIDE 15 MG/HR: 10 INJECTION, SOLUTION INTRAVENOUS at 05:07

## 2017-07-26 RX ADMIN — CHOLESTYRAMINE 4 G: 4 POWDER, FOR SUSPENSION ORAL at 10:07

## 2017-07-26 RX ADMIN — CHOLESTYRAMINE 4 G: 4 POWDER, FOR SUSPENSION ORAL at 08:07

## 2017-07-26 RX ADMIN — FUROSEMIDE 15 MG/HR: 10 INJECTION, SOLUTION INTRAVENOUS at 03:07

## 2017-07-26 RX ADMIN — FAMOTIDINE 20 MG: 20 TABLET, FILM COATED ORAL at 08:07

## 2017-07-26 RX ADMIN — SODIUM CHLORIDE, PRESERVATIVE FREE 3 ML: 5 INJECTION INTRAVENOUS at 06:07

## 2017-07-26 RX ADMIN — HEPARIN SODIUM AND DEXTROSE 14 UNITS/KG/HR: 10000; 5 INJECTION INTRAVENOUS at 03:07

## 2017-07-26 NOTE — ASSESSMENT & PLAN NOTE
67 year old scheduled for LVAD placement tomorrow; patient now has positive urine culture; ID consulted for LVAD clearance:  - urine culture positive for enterococcus species  - patient is completely asymptomatic- denies dysuria, hematuria, urinary frequency; he is afebrile and without leukocytosis; no delgado catheter in place  - would not recommend treatment for asymptomatic bacteruria at this time  - patient is cleared for LVAD placement from ID standpoint  - would initiate treatment if patient becomes symptomatic  - discussed with CTS  - seen with ID staff  - will sign off

## 2017-07-26 NOTE — TELEPHONE ENCOUNTER
Pt's case was discussed at Selection Committee 7/26/17 in regards to LVAD surgery in light of recent finding of RV dysfunction on GLENN.      Per committee decision, pt is still not a candidate for transplant due to elevated PA pressures and questionable Hep B.      Pt will have a repeat Echo to assess RV function now that he has been on Nitric.

## 2017-07-26 NOTE — ANESTHESIA PREPROCEDURE EVALUATION
07/26/2017  Pre-operative evaluation for Procedure(s) (LRB):  INSERTION-LEFT VENTRICULAR ASSIST DEVICE (N/A)    Suman Hayden is a 67 y.o. male with PMH of NICM (EF 10%) on home  at 5 mcg/kg/min, esophagitis, HTN, HLD, CKD, Hep B, and s/p [employer] CRT-D who presented to the hospital on 07/18/17 after undergoing a 6MWT and developed syncope followed by ICD shocks x 2 (Device interrogation revealed ICD shock x 5 7/14 x1, 7/17 x2, 7/18 x2). Pt is scheduled for above procedure.     Patient has been consented for both this procedure and sternal wound closure.     LDA:   22 G IV left forearm   20 G IV left antecubital  20 G IV left wrist    Prev airway:   None on file     Drips:    DOBUTamine 5 mcg/kg/min (07/26/17 1000)    furosemide (LASIX) 5 mg/mL infusion (non-titrating) 15 mg/hr (07/26/17 1000)    heparin (porcine) in D5W 14.035 Units/kg/hr (07/26/17 1000)    nitric oxide gas           Patient Active Problem List   Diagnosis    Nonischemic cardiomyopathy    CHF (NYHA class IV, ACC/AHA stage D)    Encounter for monitoring amiodarone therapy    Acute on chronic combined systolic and diastolic heart failure    HTN (hypertension)    Hyperlipidemia    V-tach    Elevated PSA    Hepatitis B core antibody positive since 2012    Acute on chronic systolic heart failure    Acute on chronic heart failure    Heart transplant candidate    Hyperbilirubinemia    AICD discharge    Syncope and collapse    Atrial tachycardia    INDIRA (acute kidney injury)    CHF (congestive heart failure)    Palliative care encounter       Review of patient's allergies indicates:  No Known Allergies     Current Facility-Administered Medications on File Prior to Visit   Medication Dose Route Frequency Provider Last Rate Last Dose    amiodarone tablet 400 mg  400 mg Oral BID Lorena Payton MD   400 mg at 07/26/17 5647     bisacodyl suppository 10 mg  10 mg Rectal Daily PRN Lorena Payton MD   10 mg at 07/19/17 2236    cefepime in dextrose 5 % 1 gram/50 mL IVPB 1 g  1 g Intravenous On Call Procedure Nadia Lucia NP        cholestyramine 4 gram packet 4 g  1 packet Oral BID Angie Torres MD   4 g at 07/26/17 0826    DOBUtamine 1000 mg in D5W 250 mL infusion (premix non-titrating)  5 mcg/kg/min (Order-Specific) Intravenous Continuous Lorena Payton MD 7.1 mL/hr at 07/26/17 1000 5 mcg/kg/min at 07/26/17 1000    famotidine tablet 20 mg  20 mg Oral Daily Angie Torres MD   20 mg at 07/26/17 0826    fluconazole (DIFLUCAN) IVPB 400 mg 200 mL  400 mg Intravenous On Call Procedure Nadia Lucia NP        furosemide (LASIX) 500 mg in sodium chloride 0.9% 100 mL infusion  15 mg/hr Intravenous Continuous Rai Krishna MD 3 mL/hr at 07/26/17 1000 15 mg/hr at 07/26/17 1000    heparin 25,000 units in dextrose 5% 250 mL (100 units/mL) infusion  17 Units/kg/hr Intravenous Continuous Lorena Payton MD 11.2 mL/hr at 07/26/17 1000 14.035 Units/kg/hr at 07/26/17 1000    magnesium oxide tablet 400 mg  400 mg Oral BID Lorena Payton MD   400 mg at 07/26/17 0826    mupirocin 2 % ointment 1 g  1 g Nasal On Call Procedure Nadia Lucia NP        nitric oxide gas Gas 10 ppm  10 ppm Inhalation Continuous Lorena Payton MD        ondansetron injection 8 mg  8 mg Intravenous Q8H PRN Angie Torres MD   8 mg at 07/23/17 0829    pneumococcal vaccine injection 0.5 mL  0.5 mL Intramuscular vaccine x 1 dose MELISSA Heredia        polyethylene glycol packet 17 g  17 g Oral Daily aCsey Newman MD   17 g at 07/26/17 0826    pravastatin tablet 20 mg  20 mg Oral QHS Lorena Payton MD   20 mg at 07/25/17 2121    senna tablet 8.6 mg  8.6 mg Oral Daily Casey Newman MD   8.6 mg at 07/26/17 0830    sodium chloride 0.9% flush 3 mL  3 mL Intravenous Q8H Lorena Payton MD   3 mL at  07/26/17 0610    vancomycin (VANCOCIN) 750 mg in dextrose 5 % 250 mL IVPB  750 mg Intravenous On Call Procedure Nadia Lucia NP         Current Outpatient Prescriptions on File Prior to Visit   Medication Sig Dispense Refill    amiodarone (PACERONE) 200 MG Tab Take by mouth once daily.      aspirin 81 MG Chew Take 81 mg by mouth once daily.      DOBUTamine (DOBUTREX) 1,000 mg/250 mL (4,000 mcg/mL) infusion Inject 414 mcg/min into the vein continuous. 500 mL 11    furosemide (LASIX) 40 MG tablet Take 1 tablet (40 mg total) by mouth 2 (two) times daily. 180 tablet 3    potassium chloride SA (K-DUR,KLOR-CON) 20 MEQ tablet Take 20 mEq by mouth once daily.       pravastatin (PRAVACHOL) 20 MG tablet Take 20 mg by mouth every evening.      spironolactone (ALDACTONE) 25 MG tablet Take 1 tablet (25 mg total) by mouth once daily. 90 tablet 3       Past Surgical History:   Procedure Laterality Date    CARDIAC CATHETERIZATION      CARDIAC DEFIBRILLATOR PLACEMENT         Social History     Social History    Marital status:      Spouse name: N/A    Number of children: N/A    Years of education: N/A     Occupational History    Not on file.     Social History Main Topics    Smoking status: Never Smoker    Smokeless tobacco: Never Used    Alcohol use No    Drug use: Unknown    Sexual activity: Not on file     Other Topics Concern    Not on file     Social History Narrative    No narrative on file         Vital Signs Range (Last 24H):  Temp:  [36.6 °C (97.9 °F)-36.8 °C (98.2 °F)]   Pulse:  [59-60]   Resp:  [8-26]   BP: (111-139)/(64-89)   SpO2:  [95 %-100 %]       CBC:   Recent Labs      07/25/17   0311  07/26/17   0354   WBC  6.69  6.76   RBC  4.99  4.96   HGB  14.6  14.1   HCT  41.5  41.8   PLT  172  151   MCV  83  84   MCH  29.3  28.4   MCHC  35.2  33.7       CMP:   Recent Labs      07/25/17   0311  07/26/17   0354   NA  130*  130*  130*  130*  129*  129*   K  4.1  4.1  4.1  4.1  4.4  4.4    CL  90*  90*  90*  90*  93*  93*   CO2  26  26  26  26  26  26   BUN  29*  29*  29*  29*  29*  29*   CREATININE  1.7*  1.7*  1.7*  1.7*  1.4  1.4   GLU  104  104  104  104  101  101   MG  2.6  2.6   PHOS  3.7  3.7   CALCIUM  9.6  9.6  9.6  9.6  8.8  8.8   ALBUMIN  3.2*  2.9*   PROT  7.9  7.4   ALKPHOS  63  58   ALT  11  11   AST  17  24   BILITOT  2.0*  1.9*       INR  Recent Labs      17   0420  17   0312  17   0354   INR  1.3*  1.2  1.2           Diagnostic Studies:      EK17   Vent. Rate : 117 BPM     Atrial Rate : 117 BPM     P-R Int : 000 ms          QRS Dur : 176 ms      QT Int : 246 ms       P-R-T Axes : 000 122 -70 degrees     QTc Int : 343 ms    AV sequential or dual chamber electronic pacemaker  Nonspecific intraventricular block  Abnormal ECG  When compared with ECG of 2017 12:57,  No significant change was found  Confirmed by Rhett Hutchins MD ([id number]) on 2017 8:51:57 PM    2D Echo:  17   CONCLUSIONS     1 - Mild left ventricular enlargement.     2 - Severely depressed left ventricular systolic function (EF 15-20%).     3 - Right ventricular enlargement with mildly to moderately depressed systolic function.     4 - Restrictive LV filling pattern, indicating markedly elevated LAP (grade 3 diastolic dysfunction).     5 - Biatrial enlargement.     6 - Mild mitral regurgitation.       Pre-op Assessment    I have reviewed the Patient Summary Reports.      I have reviewed the Medications.     Review of Systems  Anesthesia Hx:  No problems with previous Anesthesia  History of prior surgery of interest to airway management or planning: Denies Family Hx of Anesthesia complications.   Denies Personal Hx of Anesthesia complications.   Social:  Non-Smoker    Hematology/Oncology:  Hematology Normal   Oncology Normal     EENT/Dental:EENT/Dental Normal   Cardiovascular:   Pacemaker (placed 2017) Hypertension CHF hyperlipidemia         Pulmonary:  Pulmonary Normal    Renal/:   Chronic Renal Disease, CRI    Hepatic/GI:  Hepatic/GI Normal    Musculoskeletal:  Musculoskeletal Normal    Neurological:  Neurology Normal    Endocrine:  Endocrine Normal    Psych:  Psychiatric Normal           Physical Exam  General:  Well nourished    Airway/Jaw/Neck:  Airway Findings: Mouth Opening: Normal Tongue: Large  General Airway Assessment: Adult  Mallampati: III  Improves to II with phonation.  TM Distance: 4 - 6 cm        Eyes/Ears/Nose:  EYES/EARS/NOSE FINDINGS: Normal   Dental:  Dental Findings: Edentulous   Chest/Lungs:  Chest/Lungs Clear    Heart/Vascular:  Heart Findings: Normal       Mental Status:  Mental Status Findings:  Cooperative, Alert and Oriented         Anesthesia Plan  Type of Anesthesia, risks & benefits discussed:  Anesthesia Type:  general  Patient's Preference:   Intra-op Monitoring Plan: standard ASA monitors and arterial line  Intra-op Monitoring Plan Comments:   Post Op Pain Control Plan: multimodal analgesia and per primary service following discharge from PACU  Post Op Pain Control Plan Comments:   Induction:   IV  Beta Blocker:  Patient is not currently on a Beta-Blocker (No further documentation required).       Informed Consent: Patient understands risks and agrees with Anesthesia plan.  Questions answered. Anesthesia consent signed with patient.  ASA Score: 4     Day of Surgery Review of History & Physical: I have interviewed and examined the patient. I have reviewed the patient's H&P dated:    H&P update referred to the surgeon.         Ready For Surgery From Anesthesia Perspective.

## 2017-07-26 NOTE — ASSESSMENT & PLAN NOTE
- will defer liver biopsy as CTS not requiring it  - ID consult cleared the patient for sx  - no evidence of liver failure  - TBILI trending down  - AST/ALT WNL  - case discussed with hepatology, low suspicion for liver involvement

## 2017-07-26 NOTE — PLAN OF CARE
Problem: Patient Care Overview  Goal: Plan of Care Review  Outcome: Ongoing (interventions implemented as appropriate)  No acute events overnight. Patient on nitric oxide and will repeat GLENN in the AM. Will possibly go to surgery for LVAD on Thursday.  Heparin  therapeutic. Hemodynamically stable. VS and assessment documented in flow sheet, patient progressing towards goals as tolerated, plan of care reviewed with Suman Hayden and spouse, all concerns addressed, will continue to monitor.

## 2017-07-26 NOTE — PLAN OF CARE
Problem: Patient Care Overview  Goal: Plan of Care Review  Outcome: Ongoing (interventions implemented as appropriate)  Pt remains on dobutamine, lasix and heparin gtts per MD orders. Pt on Nitric Oxide at 10 ppm. Pt has Left femoral IABP 1:1 EKG trigger, Auto, -110s, Augmentation 130-140s. [facility] of some minimal oozing from groin site, came in and assessed patient, ordered to continue to monitor groin site. VS and assessment per flow sheet. Patient progressing towards goals as tolerated. Plan of care reviewed with patient and family, all questions and concerns addressed, will continue to monitor. Plan for VAD placement in the AM.

## 2017-07-26 NOTE — PROGRESS NOTES
[facility]  Heart Transplant  Progress Note    Patient Name: Suman Hayden  MRN: [id number]  Admission Date: 7/18/2017  Hospital Length of Stay: 8 days  Attending Physician: Mohit Ambrosio MD  Primary Care Provider: Joe Ernst MD  Principal Problem:Acute on chronic combined systolic and diastolic heart failure    Subjective:     Interval History: NAEON. Started on NO yesterday to evaluate for improvement in RVSF. Plan to repeat TTE today. Possible LVAD tomorrow by Dr. Downing.    Continuous Infusions:   DOBUTamine 5 mcg/kg/min (07/26/17 1300)    furosemide (LASIX) 5 mg/mL infusion (non-titrating) 15 mg/hr (07/26/17 1300)    heparin (porcine) in D5W 14.035 Units/kg/hr (07/26/17 1300)    nitric oxide gas       Scheduled Meds:   amiodarone  400 mg Oral BID    cholestyramine  1 packet Oral BID    famotidine  20 mg Oral Daily    magnesium oxide  400 mg Oral BID    polyethylene glycol  17 g Oral Daily    pravastatin  20 mg Oral QHS    senna  8.6 mg Oral Daily    sodium chloride 0.9%  3 mL Intravenous Q8H     PRN Meds:bisacodyl, ceFEPime (MAXIPIME) IVPB, fluconazole (DIFLUCAN) IVPB, mupirocin, ondansetron, pneumococcal vaccine, vancomycin (VANCOCIN) IVPB    Review of patient's allergies indicates:  No Known Allergies  Objective:     Vital Signs (Most Recent):  Temp: 98.2 °F (36.8 °C) (07/26/17 1101)  Pulse: (!) 59 (07/26/17 1300)  Resp: (!) 22 (07/26/17 1300)  BP: (!) 146/102 (07/26/17 1300)  SpO2: 100 % (07/26/17 1300) Vital Signs (24h Range):  Temp:  [97.9 °F (36.6 °C)-98.2 °F (36.8 °C)] 98.2 °F (36.8 °C)  Pulse:  [59-60] 59  Resp:  [8-26] 22  SpO2:  [99 %-100 %] 100 %  BP: (111-146)/() 146/102     Weight: 64.1 kg (141 lb 5 oz)  Body mass index is 21.49 kg/m².      Intake/Output Summary (Last 24 hours) at 07/26/17 1348  Last data filed at 07/26/17 1300   Gross per 24 hour   Intake           1391.2 ml   Output             1400 ml   Net             -8.8 ml       Hemodynamic  Parameters:       Telemetry: no events    Physical Exam   Constitutional: NAD, conversant  HEENT: Sclera anicteric, PERRLA, EOMI  Neck: Positive JVD up to angle of jaw, no carotid bruits  CV: RRR, no murmur, normal S1/S2, +S3, No Pericardial rub  Pulm: CTAB with no wheezes, rales, or rhonchi  GI: Abdomen soft, NTND, +BS  Extremities: No LE edema, warm and well perfused, No cyanosis, No clubbing, left groin IABP in place w/o evidence of hematoma  Skin: No ecchymosis, erythema, or ulcers  Psych: AOx3, appropriate affect  Neuro: CNII-XII intact, no focal deficits       Significant Labs:  CBC:    Recent Labs  Lab 07/26/17  0354   WBC 6.76   RBC 4.96   HGB 14.1   HCT 41.8      MCV 84   MCH 28.4   MCHC 33.7     BNP:    Recent Labs  Lab 07/24/17  0420   *     CMP:    Recent Labs  Lab 07/26/17  0354     101   CALCIUM 8.8  8.8   ALBUMIN 2.9*   PROT 7.4   *  129*   K 4.4  4.4   CO2 26  26   CL 93*  93*   BUN 29*  29*   CREATININE 1.4  1.4   ALKPHOS 58   ALT 11   AST 24   BILITOT 1.9*      Coagulation:     Recent Labs  Lab 07/26/17  0354   INR 1.2     LDH:  No results for input(s): LDH in the last 72 hours.  Microbiology:  Microbiology Results (last 7 days)     Procedure Component Value Units Date/Time    Urine culture [[id number]] Collected:  07/24/17 1139    Order Status:  Completed Specimen:  Urine from Urine, Clean Catch Updated:  07/26/17 0245     Urine Culture, Routine --     ENTEROCOCCUS SPECIES  >100,000 cfu/ml  Identification and susceptibility pending      Blood culture [[id number]] Collected:  07/24/17 1300    Order Status:  Completed Specimen:  Blood from Peripheral, Hand, Right Updated:  07/25/17 1812     Blood Culture, Routine No Growth to date     Blood Culture, Routine No Growth to date    Blood culture [[id number]] Collected:  07/24/17 1100    Order Status:  Completed Specimen:  Blood from Peripheral, Antecubital, Left Updated:  07/25/17 1412     Blood Culture, Routine No Growth  to date     Blood Culture, Routine No Growth to date          I have reviewed all pertinent labs within the past 24 hours.    Estimated Creatinine Clearance: 46.4 mL/min (based on Cr of 1.4).    Diagnostic Results:  CXR: X-ray Chest 1 View    Result Date: 7/26/2017  Stable radiographic appearance of the chest. Electronically signed by: Tosha De MD Date:     07/26/17 Time:    08:27   I have reviewed and interpreted all pertinent imaging results/findings within the past 24 hours.    Assessment and Plan:     * Acute on chronic combined systolic and diastolic heart failure    - c/w  at 5 mcg/kg/min  - c/w lasix 15 mg/hr  - IABP 1:1 support with diastolic augmentation 140s  - CXR reviewed which reveals stable positioning  - Singer catheter d/c'd, LIJ TLC removed 7/25/17  - NO 10 PPM   - TTE (RV dedicated)  - LVAD sx possible Thursday          AICD discharge    - appropriate in the setting of VT aggravated by underlying AT/AFL        V-tach    - amiodarone 400 mg PO BID until 8/2/17 the 400 mg QD  - monitor lytes; keep K>4.0, Mg>2.0  - appreciate EP recs        Atrial tachycardia    - [id number] - 3  - c/w amiodarone  - c/w heparin gtt        INDIRA (acute kidney injury)    - pre-renal due to hypervolemia  - c/w lasix gtt  - improving  - avoid nephrotoxic agents        Hepatitis B core antibody positive since 2012    - will defer liver biopsy as CTS not requiring it  - ID consult cleared the patient for sx  - no evidence of liver failure  - TBILI trending down  - AST/ALT WNL  - case discussed with hepatology, low suspicion for liver involvement        Urine culture positive    - patient asymptomatic  - UA - pyuria, bacteruria  - Ucx + enterococci. Sensitivities pending  - recalled ID        Hyperlipidemia    - c/w pravastatin          Discussed with Dr. Ambrosio. Possible LVAD sx in AM. NPO after MN.     Lorena Payton MD  Heart Transplant  [facility]

## 2017-07-26 NOTE — ASSESSMENT & PLAN NOTE
- c/w  at 5 mcg/kg/min  - c/w lasix 15 mg/hr  - IABP 1:1 support with diastolic augmentation 140s  - CXR reviewed which reveals stable positioning  - Singer catheter d/c'd, JAXON TLC removed 7/25/17  - NO 10 PPM   - TTE (RV dedicated)  - LVAD sx possible Thursday

## 2017-07-26 NOTE — SUBJECTIVE & OBJECTIVE
Interval History: NAEON. Started on NO yesterday to evaluate for improvement in RVSF. Plan to repeat TTE today. Possible LVAD tomorrow by Dr. Downing.    Continuous Infusions:   DOBUTamine 5 mcg/kg/min (07/26/17 1300)    furosemide (LASIX) 5 mg/mL infusion (non-titrating) 15 mg/hr (07/26/17 1300)    heparin (porcine) in D5W 14.035 Units/kg/hr (07/26/17 1300)    nitric oxide gas       Scheduled Meds:   amiodarone  400 mg Oral BID    cholestyramine  1 packet Oral BID    famotidine  20 mg Oral Daily    magnesium oxide  400 mg Oral BID    polyethylene glycol  17 g Oral Daily    pravastatin  20 mg Oral QHS    senna  8.6 mg Oral Daily    sodium chloride 0.9%  3 mL Intravenous Q8H     PRN Meds:bisacodyl, ceFEPime (MAXIPIME) IVPB, fluconazole (DIFLUCAN) IVPB, mupirocin, ondansetron, pneumococcal vaccine, vancomycin (VANCOCIN) IVPB    Review of patient's allergies indicates:  No Known Allergies  Objective:     Vital Signs (Most Recent):  Temp: 98.2 °F (36.8 °C) (07/26/17 1101)  Pulse: (!) 59 (07/26/17 1300)  Resp: (!) 22 (07/26/17 1300)  BP: (!) 146/102 (07/26/17 1300)  SpO2: 100 % (07/26/17 1300) Vital Signs (24h Range):  Temp:  [97.9 °F (36.6 °C)-98.2 °F (36.8 °C)] 98.2 °F (36.8 °C)  Pulse:  [59-60] 59  Resp:  [8-26] 22  SpO2:  [99 %-100 %] 100 %  BP: (111-146)/() 146/102     Weight: 64.1 kg (141 lb 5 oz)  Body mass index is 21.49 kg/m².      Intake/Output Summary (Last 24 hours) at 07/26/17 1348  Last data filed at 07/26/17 1300   Gross per 24 hour   Intake           1391.2 ml   Output             1400 ml   Net             -8.8 ml       Hemodynamic Parameters:       Telemetry: no events    Physical Exam   Constitutional: NAD, conversant  HEENT: Sclera anicteric, PERRLA, EOMI  Neck: Positive JVD up to angle of jaw, no carotid bruits  CV: RRR, no murmur, normal S1/S2, +S3, No Pericardial rub  Pulm: CTAB with no wheezes, rales, or rhonchi  GI: Abdomen soft, NTND, +BS  Extremities: No LE edema, warm and well  perfused, No cyanosis, No clubbing, left groin IABP in place w/o evidence of hematoma  Skin: No ecchymosis, erythema, or ulcers  Psych: AOx3, appropriate affect  Neuro: CNII-XII intact, no focal deficits       Significant Labs:  CBC:    Recent Labs  Lab 07/26/17  0354   WBC 6.76   RBC 4.96   HGB 14.1   HCT 41.8      MCV 84   MCH 28.4   MCHC 33.7     BNP:    Recent Labs  Lab 07/24/17  0420   *     CMP:    Recent Labs  Lab 07/26/17  0354     101   CALCIUM 8.8  8.8   ALBUMIN 2.9*   PROT 7.4   *  129*   K 4.4  4.4   CO2 26  26   CL 93*  93*   BUN 29*  29*   CREATININE 1.4  1.4   ALKPHOS 58   ALT 11   AST 24   BILITOT 1.9*      Coagulation:     Recent Labs  Lab 07/26/17 0354   INR 1.2     LDH:  No results for input(s): LDH in the last 72 hours.  Microbiology:  Microbiology Results (last 7 days)     Procedure Component Value Units Date/Time    Urine culture [[id number]] Collected:  07/24/17 1139    Order Status:  Completed Specimen:  Urine from Urine, Clean Catch Updated:  07/26/17 0245     Urine Culture, Routine --     ENTEROCOCCUS SPECIES  >100,000 cfu/ml  Identification and susceptibility pending      Blood culture [[id number]] Collected:  07/24/17 1300    Order Status:  Completed Specimen:  Blood from Peripheral, Hand, Right Updated:  07/25/17 1812     Blood Culture, Routine No Growth to date     Blood Culture, Routine No Growth to date    Blood culture [[id number]] Collected:  07/24/17 1100    Order Status:  Completed Specimen:  Blood from Peripheral, Antecubital, Left Updated:  07/25/17 1412     Blood Culture, Routine No Growth to date     Blood Culture, Routine No Growth to date          I have reviewed all pertinent labs within the past 24 hours.    Estimated Creatinine Clearance: 46.4 mL/min (based on Cr of 1.4).    Diagnostic Results:  CXR: X-ray Chest 1 View    Result Date: 7/26/2017  Stable radiographic appearance of the chest. Electronically signed by: Tosha De MD Date:      07/26/17 Time:    08:27   I have reviewed and interpreted all pertinent imaging results/findings within the past 24 hours.

## 2017-07-26 NOTE — SUBJECTIVE & OBJECTIVE
Past Medical History:   Diagnosis Date    Cardiomyopathy     Hyperlipidemia     Hypertension     Obesity     S/P implantation of automatic cardioverter/defibrillator (AICD)     Ventricular tachycardia        Past Surgical History:   Procedure Laterality Date    CARDIAC CATHETERIZATION      CARDIAC DEFIBRILLATOR PLACEMENT         Review of patient's allergies indicates:  No Known Allergies    Medications:  Prescriptions Prior to Admission   Medication Sig    amiodarone (PACERONE) 200 MG Tab Take by mouth once daily.    aspirin 81 MG Chew Take 81 mg by mouth once daily.    DOBUTamine (DOBUTREX) 1,000 mg/250 mL (4,000 mcg/mL) infusion Inject 414 mcg/min into the vein continuous.    furosemide (LASIX) 40 MG tablet Take 1 tablet (40 mg total) by mouth 2 (two) times daily.    potassium chloride SA (K-DUR,KLOR-CON) 20 MEQ tablet Take 20 mEq by mouth once daily.     pravastatin (PRAVACHOL) 20 MG tablet Take 20 mg by mouth every evening.    spironolactone (ALDACTONE) 25 MG tablet Take 1 tablet (25 mg total) by mouth once daily.     Antibiotics     Start     Stop Route Frequency Ordered    07/27/17 0000  cefepime in dextrose 5 % 1 gram/50 mL IVPB 1 g      -- IV On Call Procedure 07/26/17 1548    07/27/17 0000  vancomycin (VANCOCIN) 750 mg in dextrose 5 % 250 mL IVPB  (Vancomycin IVPB with levels panel)      -- IV On Call Procedure 07/26/17 1548    07/24/17 1835  mupirocin 2 % ointment 1 g      -- Nasl On Call Procedure 07/24/17 1835        Antifungals     Start     Stop Route Frequency Ordered    07/27/17 0000  fluconazole (DIFLUCAN) IVPB 400 mg 200 mL      -- IV On Call Procedure 07/26/17 1548        Antivirals     None           Immunization History   Administered Date(s) Administered    Tdap 07/19/2017       Family History     None        Social History     Social History    Marital status:      Spouse name: N/A    Number of children: N/A    Years of education: N/A     Social History Main Topics     Smoking status: Never Smoker    Smokeless tobacco: Never Used    Alcohol use No    Drug use: Unknown    Sexual activity: Not Asked     Other Topics Concern    None     Social History Narrative    None     Review of Systems   Constitutional: Negative for chills and fever.   Respiratory: Negative for cough and shortness of breath.    Cardiovascular: Negative for chest pain and leg swelling.   Gastrointestinal: Negative for abdominal pain, constipation, diarrhea, nausea and vomiting.   Genitourinary: Negative for dysuria, frequency and hematuria.   Musculoskeletal: Negative for back pain and myalgias.   Skin: Negative for rash and wound.   Neurological: Negative for dizziness, light-headedness and headaches.   Psychiatric/Behavioral: Negative for agitation and behavioral problems. The patient is not nervous/anxious.      Objective:     Vital Signs (Most Recent):  Temp: 98.2 °F (36.8 °C) (07/26/17 1101)  Pulse: 60 (07/26/17 1600)  Resp: 12 (07/26/17 1600)  BP: (!) 141/83 (07/26/17 1600)  SpO2: 97 % (07/26/17 1600) Vital Signs (24h Range):  Temp:  [98.1 °F (36.7 °C)-98.2 °F (36.8 °C)] 98.2 °F (36.8 °C)  Pulse:  [59-60] 60  Resp:  [8-26] 12  SpO2:  [97 %-100 %] 97 %  BP: (111-146)/() 141/83     Weight: 64.1 kg (141 lb 5 oz)  Body mass index is 21.49 kg/m².    Estimated Creatinine Clearance: 46.4 mL/min (based on Cr of 1.4).    Physical Exam   Constitutional: He is oriented to person, place, and time. He appears well-developed and well-nourished. No distress.   Cardiovascular: Normal rate and regular rhythm.    No murmur heard.  IABP sounds   Pulmonary/Chest: Effort normal and breath sounds normal. No respiratory distress.   Abdominal: Soft. Bowel sounds are normal. He exhibits no distension.   No suprapubic tenderness   Musculoskeletal: Normal range of motion. He exhibits no edema.   Neurological: He is alert and oriented to person, place, and time.   Skin: Skin is warm and dry.   Psychiatric: He has a  normal mood and affect. His behavior is normal.       Significant Labs:   Blood Culture:   Recent Labs  Lab 07/24/17  1100 07/24/17  1300   LABBLOO No Growth to date  No Growth to date  No Growth to date No Growth to date  No Growth to date     CBC:   Recent Labs  Lab 07/25/17  0311 07/26/17  0354   WBC 6.69 6.76   HGB 14.6 14.1   HCT 41.5 41.8    151     CMP:   Recent Labs  Lab 07/25/17  0311 07/26/17  0354   *  130*  130*  130* 129*  129*   K 4.1  4.1  4.1  4.1 4.4  4.4   CL 90*  90*  90*  90* 93*  93*   CO2 26  26  26  26 26  26     104  104  104 101  101   BUN 29*  29*  29*  29* 29*  29*   CREATININE 1.7*  1.7*  1.7*  1.7* 1.4  1.4   CALCIUM 9.6  9.6  9.6  9.6 8.8  8.8   PROT 7.9 7.4   ALBUMIN 3.2* 2.9*   BILITOT 2.0* 1.9*   ALKPHOS 63 58   AST 17 24   ALT 11 11   ANIONGAP 14  14  14  14 10  10   EGFRNONAA 40.8*  40.8*  40.8*  40.8* 51.6*  51.6*     Urine Culture:   Recent Labs  Lab 07/24/17  1139   LABURIN ENTEROCOCCUS SPECIES>100,000 cfu/mlIdentification and susceptibility pending     Urine Studies:   Recent Labs  Lab 07/24/17  1139   COLORU Yellow   APPEARANCEUA Cloudy*   PHUR 6.0   SPECGRAV 1.010   PROTEINUA Negative   GLUCUA Negative   KETONESU Negative   BILIRUBINUA Negative   OCCULTUA 3+*   NITRITE Negative   UROBILINOGEN 2.0   LEUKOCYTESUR 3+*   RBCUA 32*   WBCUA >100*   BACTERIA Few*       Significant Imaging: I have reviewed all pertinent imaging results/findings within the past 24 hours.

## 2017-07-26 NOTE — ASSESSMENT & PLAN NOTE
- patient asymptomatic  - UA - pyuria, bacteruria  - Ucx + enterococci. Sensitivities pending  - recalled ID

## 2017-07-27 ENCOUNTER — ANESTHESIA (OUTPATIENT)
Dept: SURGERY | Facility: HOSPITAL | Age: 67
DRG: 001 | End: 2017-07-27
Payer: MEDICARE

## 2017-07-27 VITALS — RESPIRATION RATE: 32 BRPM

## 2017-07-27 PROBLEM — I50.9 ACUTE ON CHRONIC HEART FAILURE: Status: RESOLVED | Noted: 2017-07-07 | Resolved: 2017-07-27

## 2017-07-27 PROBLEM — Z45.02 AICD DISCHARGE: Chronic | Status: ACTIVE | Noted: 2017-07-18

## 2017-07-27 PROBLEM — R73.9 HYPERGLYCEMIA: Status: ACTIVE | Noted: 2017-07-27

## 2017-07-27 LAB
ABO + RH BLD: NORMAL
ALBUMIN SERPL BCP-MCNC: 3 G/DL
ALBUMIN SERPL BCP-MCNC: 3 G/DL
ALLENS TEST: ABNORMAL
ALP SERPL-CCNC: 59 U/L
ALP SERPL-CCNC: 59 U/L
ALT SERPL W/O P-5'-P-CCNC: 11 U/L
ALT SERPL W/O P-5'-P-CCNC: 11 U/L
ANION GAP SERPL CALC-SCNC: 12 MMOL/L
ANION GAP SERPL CALC-SCNC: 14 MMOL/L
ANION GAP SERPL CALC-SCNC: 15 MMOL/L
ANISOCYTOSIS BLD QL SMEAR: SLIGHT
APTT BLDCRRT: 31 SEC
APTT BLDCRRT: 32.3 SEC
APTT BLDCRRT: 36.6 SEC
AST SERPL-CCNC: 19 U/L
AST SERPL-CCNC: 19 U/L
BASOPHILS # BLD AUTO: 0.01 K/UL
BASOPHILS # BLD AUTO: 0.02 K/UL
BASOPHILS # BLD AUTO: 0.02 K/UL
BASOPHILS # BLD AUTO: 0.03 K/UL
BASOPHILS NFR BLD: 0.1 %
BASOPHILS NFR BLD: 0.2 %
BASOPHILS NFR BLD: 0.2 %
BASOPHILS NFR BLD: 0.5 %
BILIRUB SERPL-MCNC: 2 MG/DL
BILIRUB SERPL-MCNC: 2 MG/DL
BLD GP AB SCN CELLS X3 SERPL QL: NORMAL
BUN SERPL-MCNC: 27 MG/DL
BUN SERPL-MCNC: 27 MG/DL
BUN SERPL-MCNC: 28 MG/DL
CALCIUM SERPL-MCNC: 8 MG/DL
CALCIUM SERPL-MCNC: 8.5 MG/DL
CALCIUM SERPL-MCNC: 8.6 MG/DL
CALCIUM SERPL-MCNC: 8.7 MG/DL
CALCIUM SERPL-MCNC: 8.7 MG/DL
CHLORIDE SERPL-SCNC: 100 MMOL/L
CHLORIDE SERPL-SCNC: 91 MMOL/L
CHLORIDE SERPL-SCNC: 91 MMOL/L
CHLORIDE SERPL-SCNC: 97 MMOL/L
CHLORIDE SERPL-SCNC: 98 MMOL/L
CO2 SERPL-SCNC: 20 MMOL/L
CO2 SERPL-SCNC: 20 MMOL/L
CO2 SERPL-SCNC: 22 MMOL/L
CO2 SERPL-SCNC: 25 MMOL/L
CO2 SERPL-SCNC: 25 MMOL/L
CREAT SERPL-MCNC: 1.4 MG/DL
CREAT SERPL-MCNC: 1.5 MG/DL
DELSYS: ABNORMAL
DIFFERENTIAL METHOD: ABNORMAL
EOSINOPHIL # BLD AUTO: 0 K/UL
EOSINOPHIL # BLD AUTO: 0 K/UL
EOSINOPHIL # BLD AUTO: 0.1 K/UL
EOSINOPHIL # BLD AUTO: 0.2 K/UL
EOSINOPHIL NFR BLD: 0.2 %
EOSINOPHIL NFR BLD: 0.3 %
EOSINOPHIL NFR BLD: 0.6 %
EOSINOPHIL NFR BLD: 3.6 %
ERYTHROCYTE [DISTWIDTH] IN BLOOD BY AUTOMATED COUNT: 14.9 %
ERYTHROCYTE [DISTWIDTH] IN BLOOD BY AUTOMATED COUNT: 15 %
ERYTHROCYTE [DISTWIDTH] IN BLOOD BY AUTOMATED COUNT: 15 %
ERYTHROCYTE [DISTWIDTH] IN BLOOD BY AUTOMATED COUNT: 15.1 %
ERYTHROCYTE [SEDIMENTATION RATE] IN BLOOD BY WESTERGREN METHOD: 14 MM/H
ERYTHROCYTE [SEDIMENTATION RATE] IN BLOOD BY WESTERGREN METHOD: 14 MM/H
ERYTHROCYTE [SEDIMENTATION RATE] IN BLOOD BY WESTERGREN METHOD: 16 MM/H
EST. GFR  (AFRICAN AMERICAN): 54.9 ML/MIN/1.73 M^2
EST. GFR  (AFRICAN AMERICAN): 59.7 ML/MIN/1.73 M^2
EST. GFR  (NON AFRICAN AMERICAN): 47.5 ML/MIN/1.73 M^2
EST. GFR  (NON AFRICAN AMERICAN): 51.6 ML/MIN/1.73 M^2
FIBRINOGEN PPP-MCNC: 307 MG/DL
FIO2: 100
FIO2: 50
FIO2: 60
FLOW: 60
GLUCOSE SERPL-MCNC: 103 MG/DL
GLUCOSE SERPL-MCNC: 103 MG/DL
GLUCOSE SERPL-MCNC: 149 MG/DL
GLUCOSE SERPL-MCNC: 163 MG/DL (ref 70–110)
GLUCOSE SERPL-MCNC: 172 MG/DL (ref 70–110)
GLUCOSE SERPL-MCNC: 180 MG/DL (ref 70–110)
GLUCOSE SERPL-MCNC: 181 MG/DL
GLUCOSE SERPL-MCNC: 183 MG/DL
GLUCOSE SERPL-MCNC: 185 MG/DL (ref 70–110)
GLUCOSE SERPL-MCNC: 203 MG/DL (ref 70–110)
HCO3 UR-SCNC: 21.6 MMOL/L (ref 24–28)
HCO3 UR-SCNC: 22.7 MMOL/L (ref 24–28)
HCO3 UR-SCNC: 22.9 MMOL/L (ref 24–28)
HCO3 UR-SCNC: 23.4 MMOL/L (ref 24–28)
HCO3 UR-SCNC: 23.8 MMOL/L (ref 24–28)
HCO3 UR-SCNC: 24 MMOL/L (ref 24–28)
HCO3 UR-SCNC: 24.1 MMOL/L (ref 24–28)
HCO3 UR-SCNC: 24.2 MMOL/L (ref 24–28)
HCO3 UR-SCNC: 24.9 MMOL/L (ref 24–28)
HCO3 UR-SCNC: 25.2 MMOL/L (ref 24–28)
HCO3 UR-SCNC: 27.7 MMOL/L (ref 24–28)
HCO3 UR-SCNC: 27.7 MMOL/L (ref 24–28)
HCO3 UR-SCNC: 28 MMOL/L (ref 24–28)
HCO3 UR-SCNC: 28.4 MMOL/L (ref 24–28)
HCO3 UR-SCNC: 28.8 MMOL/L (ref 24–28)
HCO3 UR-SCNC: 34.3 MMOL/L (ref 24–28)
HCT VFR BLD AUTO: 21.8 %
HCT VFR BLD AUTO: 23.4 %
HCT VFR BLD AUTO: 24.5 %
HCT VFR BLD AUTO: 38.6 %
HCT VFR BLD CALC: 24 %PCV (ref 36–54)
HCT VFR BLD CALC: 27 %PCV (ref 36–54)
HCT VFR BLD CALC: 30 %PCV (ref 36–54)
HCT VFR BLD CALC: 32 %PCV (ref 36–54)
HCT VFR BLD CALC: 33 %PCV (ref 36–54)
HCT VFR BLD CALC: 42 %PCV (ref 36–54)
HGB BLD-MCNC: 13.1 G/DL
HGB BLD-MCNC: 7.3 G/DL
HGB BLD-MCNC: 7.7 G/DL
HGB BLD-MCNC: 8.3 G/DL
HGB FREE PLAS-MCNC: 21.4 MG/DL (ref 0–9.7)
HYPOCHROMIA BLD QL SMEAR: ABNORMAL
INR PPP: 1.2
INR PPP: 1.3
INR PPP: 1.3
INR PPP: 1.4
LDH SERPL L TO P-CCNC: 2.37 MMOL/L (ref 0.36–1.25)
LDH SERPL L TO P-CCNC: 2.96 MMOL/L (ref 0.36–1.25)
LDH SERPL L TO P-CCNC: 2.98 MMOL/L (ref 0.36–1.25)
LDH SERPL L TO P-CCNC: 3.3 MMOL/L (ref 0.36–1.25)
LDH SERPL L TO P-CCNC: 3.4 MMOL/L (ref 0.36–1.25)
LDH SERPL L TO P-CCNC: 3.62 MMOL/L (ref 0.36–1.25)
LDH SERPL L TO P-CCNC: 3.81 MMOL/L (ref 0.36–1.25)
LDH SERPL L TO P-CCNC: 3.89 MMOL/L (ref 0.36–1.25)
LDH SERPL L TO P-CCNC: 4.02 MMOL/L (ref 0.36–1.25)
LDH SERPL L TO P-CCNC: 4.26 MMOL/L (ref 0.36–1.25)
LYMPHOCYTES # BLD AUTO: 0.8 K/UL
LYMPHOCYTES # BLD AUTO: 0.8 K/UL
LYMPHOCYTES # BLD AUTO: 1 K/UL
LYMPHOCYTES # BLD AUTO: 1 K/UL
LYMPHOCYTES NFR BLD: 16.6 %
LYMPHOCYTES NFR BLD: 7.6 %
LYMPHOCYTES NFR BLD: 8.1 %
LYMPHOCYTES NFR BLD: 9.6 %
MAGNESIUM SERPL-MCNC: 2.3 MG/DL
MAGNESIUM SERPL-MCNC: 2.5 MG/DL
MAGNESIUM SERPL-MCNC: 2.5 MG/DL
MAGNESIUM SERPL-MCNC: 2.7 MG/DL
MCH RBC QN AUTO: 28 PG
MCH RBC QN AUTO: 28.2 PG
MCH RBC QN AUTO: 28.3 PG
MCH RBC QN AUTO: 28.5 PG
MCHC RBC AUTO-ENTMCNC: 32.9 G/DL
MCHC RBC AUTO-ENTMCNC: 33.5 G/DL
MCHC RBC AUTO-ENTMCNC: 33.9 G/DL
MCHC RBC AUTO-ENTMCNC: 33.9 G/DL
MCV RBC AUTO: 83 FL
MCV RBC AUTO: 84 FL
MCV RBC AUTO: 84 FL
MCV RBC AUTO: 85 FL
METHEMOGLOBIN: 1.1 % (ref 0–3)
MODE: ABNORMAL
MONOCYTES # BLD AUTO: 0.8 K/UL
MONOCYTES # BLD AUTO: 0.9 K/UL
MONOCYTES # BLD AUTO: 0.9 K/UL
MONOCYTES # BLD AUTO: 1.2 K/UL
MONOCYTES NFR BLD: 10.4 %
MONOCYTES NFR BLD: 15.1 %
MONOCYTES NFR BLD: 7.9 %
MONOCYTES NFR BLD: 9.1 %
NEUTROPHILS # BLD AUTO: 3.9 K/UL
NEUTROPHILS # BLD AUTO: 7 K/UL
NEUTROPHILS # BLD AUTO: 9 K/UL
NEUTROPHILS # BLD AUTO: 9.8 K/UL
NEUTROPHILS NFR BLD: 63.9 %
NEUTROPHILS NFR BLD: 80.3 %
NEUTROPHILS NFR BLD: 81.6 %
NEUTROPHILS NFR BLD: 82.7 %
PCO2 BLDA: 31.4 MMHG (ref 35–45)
PCO2 BLDA: 33.2 MMHG (ref 35–45)
PCO2 BLDA: 33.4 MMHG (ref 35–45)
PCO2 BLDA: 34.2 MMHG (ref 35–45)
PCO2 BLDA: 35 MMHG (ref 35–45)
PCO2 BLDA: 35.4 MMHG (ref 35–45)
PCO2 BLDA: 37.3 MMHG (ref 35–45)
PCO2 BLDA: 38.1 MMHG (ref 35–45)
PCO2 BLDA: 38.5 MMHG (ref 35–45)
PCO2 BLDA: 38.7 MMHG (ref 35–45)
PCO2 BLDA: 39.8 MMHG (ref 35–45)
PCO2 BLDA: 40.5 MMHG (ref 35–45)
PCO2 BLDA: 40.6 MMHG (ref 35–45)
PCO2 BLDA: 41 MMHG (ref 35–45)
PCO2 BLDA: 42.2 MMHG (ref 35–45)
PCO2 BLDA: 42.2 MMHG (ref 35–45)
PEEP: 5
PH SMN: 7.36 [PH] (ref 7.35–7.45)
PH SMN: 7.39 [PH] (ref 7.35–7.45)
PH SMN: 7.4 [PH] (ref 7.35–7.45)
PH SMN: 7.4 [PH] (ref 7.35–7.45)
PH SMN: 7.42 [PH] (ref 7.35–7.45)
PH SMN: 7.42 [PH] (ref 7.35–7.45)
PH SMN: 7.43 [PH] (ref 7.35–7.45)
PH SMN: 7.43 [PH] (ref 7.35–7.45)
PH SMN: 7.44 [PH] (ref 7.35–7.45)
PH SMN: 7.45 [PH] (ref 7.35–7.45)
PH SMN: 7.45 [PH] (ref 7.35–7.45)
PH SMN: 7.46 [PH] (ref 7.35–7.45)
PH SMN: 7.46 [PH] (ref 7.35–7.45)
PH SMN: 7.48 [PH] (ref 7.35–7.45)
PH SMN: 7.53 [PH] (ref 7.35–7.45)
PH SMN: 7.56 [PH] (ref 7.35–7.45)
PHOSPHATE SERPL-MCNC: 2.4 MG/DL
PHOSPHATE SERPL-MCNC: 2.8 MG/DL
PHOSPHATE SERPL-MCNC: 3.2 MG/DL
PHOSPHATE SERPL-MCNC: 3.4 MG/DL
PIP: 24
PIP: 26
PIP: 26
PIP: 27
PIP: 28
PIP: 30
PIP: 31
PLATELET # BLD AUTO: 126 K/UL
PLATELET # BLD AUTO: 72 K/UL
PLATELET # BLD AUTO: 79 K/UL
PLATELET # BLD AUTO: 91 K/UL
PLATELET BLD QL SMEAR: ABNORMAL
PMV BLD AUTO: 10.8 FL
PMV BLD AUTO: 11 FL
PMV BLD AUTO: 11.6 FL
PMV BLD AUTO: 12.4 FL
PO2 BLDA: 220 MMHG (ref 80–100)
PO2 BLDA: 237 MMHG (ref 80–100)
PO2 BLDA: 242 MMHG (ref 80–100)
PO2 BLDA: 250 MMHG (ref 80–100)
PO2 BLDA: 251 MMHG (ref 80–100)
PO2 BLDA: 253 MMHG (ref 80–100)
PO2 BLDA: 261 MMHG (ref 80–100)
PO2 BLDA: 262 MMHG (ref 80–100)
PO2 BLDA: 268 MMHG (ref 80–100)
PO2 BLDA: 298 MMHG (ref 80–100)
PO2 BLDA: 304 MMHG (ref 80–100)
PO2 BLDA: 34 MMHG (ref 40–60)
PO2 BLDA: 368 MMHG (ref 80–100)
PO2 BLDA: 526 MMHG (ref 80–100)
PO2 BLDA: 539 MMHG (ref 80–100)
PO2 BLDA: 607 MMHG (ref 80–100)
POC ACTIVATED CLOTTING TIME K: 125 SEC (ref 74–137)
POC BE: -1 MMOL/L
POC BE: -1 MMOL/L
POC BE: -2 MMOL/L
POC BE: 0 MMOL/L
POC BE: 12 MMOL/L
POC BE: 4 MMOL/L
POC BE: 4 MMOL/L
POC BE: 5 MMOL/L
POC IONIZED CALCIUM: 0.98 MMOL/L (ref 1.06–1.42)
POC IONIZED CALCIUM: 1.02 MMOL/L (ref 1.06–1.42)
POC IONIZED CALCIUM: 1.05 MMOL/L (ref 1.06–1.42)
POC IONIZED CALCIUM: 1.07 MMOL/L (ref 1.06–1.42)
POC IONIZED CALCIUM: 1.11 MMOL/L (ref 1.06–1.42)
POC IONIZED CALCIUM: 1.92 MMOL/L (ref 1.06–1.42)
POC SATURATED O2: 100 % (ref 95–100)
POC SATURATED O2: 63 % (ref 95–100)
POC TCO2: 23 MMOL/L (ref 23–27)
POC TCO2: 24 MMOL/L (ref 23–27)
POC TCO2: 25 MMOL/L (ref 23–27)
POC TCO2: 25 MMOL/L (ref 24–29)
POC TCO2: 26 MMOL/L (ref 23–27)
POC TCO2: 26 MMOL/L (ref 23–27)
POC TCO2: 29 MMOL/L (ref 23–27)
POC TCO2: 30 MMOL/L (ref 23–27)
POC TCO2: 30 MMOL/L (ref 23–27)
POC TCO2: 35 MMOL/L (ref 23–27)
POCT GLUCOSE: 165 MG/DL (ref 70–110)
POCT GLUCOSE: 171 MG/DL (ref 70–110)
POCT GLUCOSE: 182 MG/DL (ref 70–110)
POCT GLUCOSE: 189 MG/DL (ref 70–110)
POCT GLUCOSE: 192 MG/DL (ref 70–110)
POTASSIUM BLD-SCNC: 3.6 MMOL/L (ref 3.5–5.1)
POTASSIUM BLD-SCNC: 3.8 MMOL/L (ref 3.5–5.1)
POTASSIUM BLD-SCNC: 3.8 MMOL/L (ref 3.5–5.1)
POTASSIUM SERPL-SCNC: 3.8 MMOL/L
POTASSIUM SERPL-SCNC: 3.8 MMOL/L
POTASSIUM SERPL-SCNC: 4 MMOL/L
POTASSIUM SERPL-SCNC: 4 MMOL/L
POTASSIUM SERPL-SCNC: 4.1 MMOL/L
PROT SERPL-MCNC: 7.2 G/DL
PROT SERPL-MCNC: 7.2 G/DL
PROTHROMBIN TIME: 12.5 SEC
PROTHROMBIN TIME: 13.4 SEC
PROTHROMBIN TIME: 13.7 SEC
PROTHROMBIN TIME: 14.1 SEC
PS: 10
RBC # BLD AUTO: 2.59 M/UL
RBC # BLD AUTO: 2.75 M/UL
RBC # BLD AUTO: 2.91 M/UL
RBC # BLD AUTO: 4.63 M/UL
SAMPLE: ABNORMAL
SAMPLE: NORMAL
SITE: ABNORMAL
SODIUM BLD-SCNC: 126 MMOL/L (ref 136–145)
SODIUM BLD-SCNC: 126 MMOL/L (ref 136–145)
SODIUM BLD-SCNC: 128 MMOL/L (ref 136–145)
SODIUM BLD-SCNC: 129 MMOL/L (ref 136–145)
SODIUM BLD-SCNC: 131 MMOL/L (ref 136–145)
SODIUM BLD-SCNC: 132 MMOL/L (ref 136–145)
SODIUM SERPL-SCNC: 130 MMOL/L
SODIUM SERPL-SCNC: 130 MMOL/L
SODIUM SERPL-SCNC: 132 MMOL/L
SODIUM SERPL-SCNC: 132 MMOL/L
SODIUM SERPL-SCNC: 134 MMOL/L
SP02: 100
SP02: 100
SP02: 92
SP02: 92
SP02: 93
SP02: 94
SP02: 97
VT: 500
WBC # BLD AUTO: 11.08 K/UL
WBC # BLD AUTO: 11.84 K/UL
WBC # BLD AUTO: 6.1 K/UL
WBC # BLD AUTO: 8.66 K/UL

## 2017-07-27 PROCEDURE — A4216 STERILE WATER/SALINE, 10 ML: HCPCS | Performed by: STUDENT IN AN ORGANIZED HEALTH CARE EDUCATION/TRAINING PROGRAM

## 2017-07-27 PROCEDURE — 99900026 HC AIRWAY MAINTENANCE (STAT)

## 2017-07-27 PROCEDURE — 88307 TISSUE EXAM BY PATHOLOGIST: CPT | Performed by: PATHOLOGY

## 2017-07-27 PROCEDURE — 82803 BLOOD GASES ANY COMBINATION: CPT

## 2017-07-27 PROCEDURE — 85025 COMPLETE CBC W/AUTO DIFF WBC: CPT

## 2017-07-27 PROCEDURE — P9045 ALBUMIN (HUMAN), 5%, 250 ML: HCPCS | Performed by: STUDENT IN AN ORGANIZED HEALTH CARE EDUCATION/TRAINING PROGRAM

## 2017-07-27 PROCEDURE — 88307 TISSUE EXAM BY PATHOLOGIST: CPT | Mod: 26,,, | Performed by: PATHOLOGY

## 2017-07-27 PROCEDURE — 84100 ASSAY OF PHOSPHORUS: CPT

## 2017-07-27 PROCEDURE — 27801475 HC HEARTMATE III IMPLANT KIT

## 2017-07-27 PROCEDURE — 37799 UNLISTED PX VASCULAR SURGERY: CPT

## 2017-07-27 PROCEDURE — 63600367 HC NITRIC OXIDE PER HOUR

## 2017-07-27 PROCEDURE — 27000191 HC C-V MONITORING

## 2017-07-27 PROCEDURE — 36000713 HC OR TIME LEV V EA ADD 15 MIN: Performed by: THORACIC SURGERY (CARDIOTHORACIC VASCULAR SURGERY)

## 2017-07-27 PROCEDURE — P9045 ALBUMIN (HUMAN), 5%, 250 ML: HCPCS

## 2017-07-27 PROCEDURE — 93463 DRUG ADMIN & HEMODYNMIC MEAS: CPT

## 2017-07-27 PROCEDURE — 02BL0ZX EXCISION OF LEFT VENTRICLE, OPEN APPROACH, DIAGNOSTIC: ICD-10-PCS | Performed by: THORACIC SURGERY (CARDIOTHORACIC VASCULAR SURGERY)

## 2017-07-27 PROCEDURE — 37000008 HC ANESTHESIA 1ST 15 MINUTES: Performed by: THORACIC SURGERY (CARDIOTHORACIC VASCULAR SURGERY)

## 2017-07-27 PROCEDURE — C1729 CATH, DRAINAGE: HCPCS | Performed by: THORACIC SURGERY (CARDIOTHORACIC VASCULAR SURGERY)

## 2017-07-27 PROCEDURE — 84100 ASSAY OF PHOSPHORUS: CPT | Mod: 91

## 2017-07-27 PROCEDURE — 25000003 PHARM REV CODE 250: Performed by: STUDENT IN AN ORGANIZED HEALTH CARE EDUCATION/TRAINING PROGRAM

## 2017-07-27 PROCEDURE — 93284 PRGRMG EVAL IMPLANTABLE DFB: CPT | Mod: 26,,, | Performed by: INTERNAL MEDICINE

## 2017-07-27 PROCEDURE — 80048 BASIC METABOLIC PNL TOTAL CA: CPT | Mod: 91

## 2017-07-27 PROCEDURE — 25000003 PHARM REV CODE 250: Performed by: THORACIC SURGERY (CARDIOTHORACIC VASCULAR SURGERY)

## 2017-07-27 PROCEDURE — 85730 THROMBOPLASTIN TIME PARTIAL: CPT | Mod: 91

## 2017-07-27 PROCEDURE — 83735 ASSAY OF MAGNESIUM: CPT

## 2017-07-27 PROCEDURE — 27100088 HC CELL SAVER

## 2017-07-27 PROCEDURE — C9113 INJ PANTOPRAZOLE SODIUM, VIA: HCPCS | Performed by: STUDENT IN AN ORGANIZED HEALTH CARE EDUCATION/TRAINING PROGRAM

## 2017-07-27 PROCEDURE — P9021 RED BLOOD CELLS UNIT: HCPCS

## 2017-07-27 PROCEDURE — 63600175 PHARM REV CODE 636 W HCPCS: Performed by: THORACIC SURGERY (CARDIOTHORACIC VASCULAR SURGERY)

## 2017-07-27 PROCEDURE — 83050 HGB METHEMOGLOBIN QUAN: CPT

## 2017-07-27 PROCEDURE — 63600175 PHARM REV CODE 636 W HCPCS: Performed by: STUDENT IN AN ORGANIZED HEALTH CARE EDUCATION/TRAINING PROGRAM

## 2017-07-27 PROCEDURE — 36000712 HC OR TIME LEV V 1ST 15 MIN: Performed by: THORACIC SURGERY (CARDIOTHORACIC VASCULAR SURGERY)

## 2017-07-27 PROCEDURE — 33968 REMOVE AORTIC ASSIST DEVICE: CPT | Mod: ,,, | Performed by: THORACIC SURGERY (CARDIOTHORACIC VASCULAR SURGERY)

## 2017-07-27 PROCEDURE — 27000175 HC ADULT BYPASS PUMP

## 2017-07-27 PROCEDURE — 85384 FIBRINOGEN ACTIVITY: CPT

## 2017-07-27 PROCEDURE — 86920 COMPATIBILITY TEST SPIN: CPT

## 2017-07-27 PROCEDURE — C1768 GRAFT, VASCULAR: HCPCS | Performed by: THORACIC SURGERY (CARDIOTHORACIC VASCULAR SURGERY)

## 2017-07-27 PROCEDURE — 20000000 HC ICU ROOM

## 2017-07-27 PROCEDURE — 02HA0QZ INSERTION OF IMPLANTABLE HEART ASSIST SYSTEM INTO HEART, OPEN APPROACH: ICD-10-PCS | Performed by: THORACIC SURGERY (CARDIOTHORACIC VASCULAR SURGERY)

## 2017-07-27 PROCEDURE — 33979 INSERT INTRACORPOREAL DEVICE: CPT | Mod: ,,, | Performed by: THORACIC SURGERY (CARDIOTHORACIC VASCULAR SURGERY)

## 2017-07-27 PROCEDURE — 83735 ASSAY OF MAGNESIUM: CPT | Mod: 91

## 2017-07-27 PROCEDURE — 63600175 PHARM REV CODE 636 W HCPCS

## 2017-07-27 PROCEDURE — 93503 INSERT/PLACE HEART CATHETER: CPT | Mod: 59,,, | Performed by: ANESTHESIOLOGY

## 2017-07-27 PROCEDURE — 27000202 HC IABP, ADD'L DAY

## 2017-07-27 PROCEDURE — 5A02216 ASSISTANCE WITH CARDIAC OUTPUT USING OTHER PUMP, CONTINUOUS: ICD-10-PCS | Performed by: THORACIC SURGERY (CARDIOTHORACIC VASCULAR SURGERY)

## 2017-07-27 PROCEDURE — 85610 PROTHROMBIN TIME: CPT | Mod: 91

## 2017-07-27 PROCEDURE — 94002 VENT MGMT INPAT INIT DAY: CPT

## 2017-07-27 PROCEDURE — 36620 INSERTION CATHETER ARTERY: CPT | Mod: 59,,, | Performed by: ANESTHESIOLOGY

## 2017-07-27 PROCEDURE — D9220A PRA ANESTHESIA: Mod: ,,, | Performed by: ANESTHESIOLOGY

## 2017-07-27 PROCEDURE — 99233 SBSQ HOSP IP/OBS HIGH 50: CPT | Mod: ,,, | Performed by: INTERNAL MEDICINE

## 2017-07-27 PROCEDURE — 99900035 HC TECH TIME PER 15 MIN (STAT)

## 2017-07-27 PROCEDURE — 85610 PROTHROMBIN TIME: CPT

## 2017-07-27 PROCEDURE — 37000009 HC ANESTHESIA EA ADD 15 MINS: Performed by: THORACIC SURGERY (CARDIOTHORACIC VASCULAR SURGERY)

## 2017-07-27 PROCEDURE — 93287 PERI-PX DEVICE EVAL & PRGR: CPT | Mod: 26,,, | Performed by: INTERNAL MEDICINE

## 2017-07-27 PROCEDURE — 27201673 HC ANCILLARY CANNULA

## 2017-07-27 PROCEDURE — 27201423 OPTIME MED/SURG SUP & DEVICES STERILE SUPPLY: Performed by: THORACIC SURGERY (CARDIOTHORACIC VASCULAR SURGERY)

## 2017-07-27 PROCEDURE — 5A1221Z PERFORMANCE OF CARDIAC OUTPUT, CONTINUOUS: ICD-10-PCS | Performed by: THORACIC SURGERY (CARDIOTHORACIC VASCULAR SURGERY)

## 2017-07-27 PROCEDURE — 93287 PERI-PX DEVICE EVAL & PRGR: CPT

## 2017-07-27 PROCEDURE — 99223 1ST HOSP IP/OBS HIGH 75: CPT | Mod: ,,, | Performed by: NURSE PRACTITIONER

## 2017-07-27 PROCEDURE — 83605 ASSAY OF LACTIC ACID: CPT

## 2017-07-27 PROCEDURE — 80053 COMPREHEN METABOLIC PANEL: CPT

## 2017-07-27 PROCEDURE — 86900 BLOOD TYPING SEROLOGIC ABO: CPT

## 2017-07-27 PROCEDURE — 85520 HEPARIN ASSAY: CPT

## 2017-07-27 PROCEDURE — 86850 RBC ANTIBODY SCREEN: CPT

## 2017-07-27 DEVICE — FELT TEFLON 1INX6IN: Type: IMPLANTABLE DEVICE | Site: HEART | Status: FUNCTIONAL

## 2017-07-27 DEVICE — IMPLANTABLE DEVICE: Type: IMPLANTABLE DEVICE | Site: HEART | Status: FUNCTIONAL

## 2017-07-27 RX ORDER — FENTANYL CITRATE 50 UG/ML
25 INJECTION, SOLUTION INTRAMUSCULAR; INTRAVENOUS EVERY 4 HOURS PRN
Status: DISCONTINUED | OUTPATIENT
Start: 2017-07-27 | End: 2017-07-28

## 2017-07-27 RX ORDER — BACITRACIN 50000 [IU]/1
INJECTION, POWDER, FOR SOLUTION INTRAMUSCULAR
Status: DISCONTINUED | OUTPATIENT
Start: 1840-12-31 | End: 2017-07-27 | Stop reason: HOSPADM

## 2017-07-27 RX ORDER — ASPIRIN 325 MG
325 TABLET ORAL DAILY
Status: DISCONTINUED | OUTPATIENT
Start: 2017-07-27 | End: 2017-07-31

## 2017-07-27 RX ORDER — POTASSIUM CHLORIDE 14.9 MG/ML
40 INJECTION INTRAVENOUS
Status: DISCONTINUED | OUTPATIENT
Start: 2017-07-27 | End: 2017-08-09

## 2017-07-27 RX ORDER — DOXYCYCLINE 100 MG/10ML
INJECTION, POWDER, LYOPHILIZED, FOR SOLUTION INTRAVENOUS
Status: DISCONTINUED | OUTPATIENT
Start: 2017-07-27 | End: 2017-07-27 | Stop reason: HOSPADM

## 2017-07-27 RX ORDER — FLUCONAZOLE 2 MG/ML
400 INJECTION, SOLUTION INTRAVENOUS
Status: DISCONTINUED | OUTPATIENT
Start: 2017-07-28 | End: 2017-07-28

## 2017-07-27 RX ORDER — ASPIRIN 325 MG
325 TABLET ORAL ONCE
Status: COMPLETED | OUTPATIENT
Start: 2017-07-27 | End: 2017-07-27

## 2017-07-27 RX ORDER — FLUCONAZOLE 2 MG/ML
400 INJECTION, SOLUTION INTRAVENOUS ONCE
Status: DISCONTINUED | OUTPATIENT
Start: 2017-07-27 | End: 2017-07-27 | Stop reason: HOSPADM

## 2017-07-27 RX ORDER — CEFEPIME HYDROCHLORIDE 1 G/50ML
1 INJECTION, SOLUTION INTRAVENOUS
Status: DISCONTINUED | OUTPATIENT
Start: 2017-07-27 | End: 2017-07-31

## 2017-07-27 RX ORDER — MAGNESIUM SULFATE HEPTAHYDRATE 40 MG/ML
2 INJECTION, SOLUTION INTRAVENOUS
Status: DISCONTINUED | OUTPATIENT
Start: 2017-07-27 | End: 2017-08-09

## 2017-07-27 RX ORDER — ADHESIVE BANDAGE
30 BANDAGE TOPICAL DAILY PRN
Status: DISCONTINUED | OUTPATIENT
Start: 2017-07-27 | End: 2017-08-09

## 2017-07-27 RX ORDER — DOBUTAMINE HYDROCHLORIDE 200 MG/100ML
INJECTION INTRAVENOUS CONTINUOUS PRN
Status: DISCONTINUED | OUTPATIENT
Start: 2017-07-27 | End: 2017-07-27

## 2017-07-27 RX ORDER — ALBUTEROL SULFATE 0.83 MG/ML
2.5 SOLUTION RESPIRATORY (INHALATION) EVERY 4 HOURS PRN
Status: DISCONTINUED | OUTPATIENT
Start: 2017-07-27 | End: 2017-07-28

## 2017-07-27 RX ORDER — ALBUMIN HUMAN 50 G/1000ML
SOLUTION INTRAVENOUS
Status: COMPLETED
Start: 2017-07-27 | End: 2017-07-27

## 2017-07-27 RX ORDER — HYDROCODONE BITARTRATE AND ACETAMINOPHEN 500; 5 MG/1; MG/1
TABLET ORAL
Status: DISCONTINUED | OUTPATIENT
Start: 2017-07-27 | End: 2017-07-31

## 2017-07-27 RX ORDER — FLUCONAZOLE 2 MG/ML
INJECTION, SOLUTION INTRAVENOUS
Status: DISCONTINUED | OUTPATIENT
Start: 2017-07-27 | End: 2017-07-27

## 2017-07-27 RX ORDER — NICARDIPINE HYDROCHLORIDE 0.2 MG/ML
INJECTION INTRAVENOUS CONTINUOUS PRN
Status: DISCONTINUED | OUTPATIENT
Start: 2017-07-27 | End: 2017-07-27

## 2017-07-27 RX ORDER — DOBUTAMINE HYDROCHLORIDE 400 MG/100ML
5 INJECTION INTRAVENOUS CONTINUOUS
Status: DISCONTINUED | OUTPATIENT
Start: 2017-07-27 | End: 2017-08-11

## 2017-07-27 RX ORDER — ALBUMIN HUMAN 50 G/1000ML
SOLUTION INTRAVENOUS CONTINUOUS PRN
Status: DISCONTINUED | OUTPATIENT
Start: 2017-07-27 | End: 2017-07-27

## 2017-07-27 RX ORDER — FUROSEMIDE 10 MG/ML
INJECTION INTRAMUSCULAR; INTRAVENOUS
Status: DISCONTINUED | OUTPATIENT
Start: 2017-07-27 | End: 2017-07-27

## 2017-07-27 RX ORDER — DOCUSATE SODIUM 100 MG/1
200 CAPSULE, LIQUID FILLED ORAL NIGHTLY
Status: DISCONTINUED | OUTPATIENT
Start: 2017-07-27 | End: 2017-08-04

## 2017-07-27 RX ORDER — MIDAZOLAM HYDROCHLORIDE 1 MG/ML
INJECTION, SOLUTION INTRAMUSCULAR; INTRAVENOUS
Status: DISCONTINUED | OUTPATIENT
Start: 2017-07-27 | End: 2017-07-27

## 2017-07-27 RX ORDER — MUPIROCIN 20 MG/G
OINTMENT TOPICAL 2 TIMES DAILY
Status: DISPENSED | OUTPATIENT
Start: 2017-07-27 | End: 2017-08-01

## 2017-07-27 RX ORDER — DEXTROSE MONOHYDRATE, SODIUM CHLORIDE, AND POTASSIUM CHLORIDE 50; 2.98; 4.5 G/1000ML; G/1000ML; G/1000ML
INJECTION, SOLUTION INTRAVENOUS CONTINUOUS
Status: DISCONTINUED | OUTPATIENT
Start: 2017-07-27 | End: 2017-07-28

## 2017-07-27 RX ORDER — PROTAMINE SULFATE 10 MG/ML
INJECTION, SOLUTION INTRAVENOUS
Status: DISCONTINUED | OUTPATIENT
Start: 2017-07-27 | End: 2017-07-27

## 2017-07-27 RX ORDER — SODIUM CHLORIDE 9 MG/ML
INJECTION, SOLUTION INTRAVENOUS CONTINUOUS
Status: DISCONTINUED | OUTPATIENT
Start: 2017-07-27 | End: 2017-08-02

## 2017-07-27 RX ORDER — ALBUTEROL SULFATE 0.83 MG/ML
2.5 SOLUTION RESPIRATORY (INHALATION) EVERY 4 HOURS PRN
Status: DISCONTINUED | OUTPATIENT
Start: 2017-07-27 | End: 2017-09-22 | Stop reason: HOSPADM

## 2017-07-27 RX ORDER — CEFAZOLIN SODIUM 1 G/3ML
INJECTION, POWDER, FOR SOLUTION INTRAMUSCULAR; INTRAVENOUS
Status: DISCONTINUED | OUTPATIENT
Start: 2017-07-27 | End: 2017-07-27

## 2017-07-27 RX ORDER — FLUCONAZOLE 2 MG/ML
INJECTION, SOLUTION INTRAVENOUS CONTINUOUS PRN
Status: DISCONTINUED | OUTPATIENT
Start: 2017-07-27 | End: 2017-07-27 | Stop reason: HOSPADM

## 2017-07-27 RX ORDER — FENTANYL CITRATE 50 UG/ML
INJECTION, SOLUTION INTRAMUSCULAR; INTRAVENOUS
Status: DISCONTINUED | OUTPATIENT
Start: 2017-07-27 | End: 2017-07-27

## 2017-07-27 RX ORDER — ALBUMIN HUMAN 50 G/1000ML
500 SOLUTION INTRAVENOUS
Status: DISCONTINUED | OUTPATIENT
Start: 2017-07-27 | End: 2017-09-07

## 2017-07-27 RX ORDER — BACITRACIN 50000 [IU]/1
INJECTION, POWDER, FOR SOLUTION INTRAMUSCULAR
Status: DISCONTINUED | OUTPATIENT
Start: 2017-07-27 | End: 2017-07-27 | Stop reason: HOSPADM

## 2017-07-27 RX ORDER — PROPOFOL 10 MG/ML
5 INJECTION, EMULSION INTRAVENOUS CONTINUOUS
Status: DISCONTINUED | OUTPATIENT
Start: 2017-07-27 | End: 2017-07-31

## 2017-07-27 RX ORDER — SODIUM CHLORIDE 0.9 % (FLUSH) 0.9 %
3 SYRINGE (ML) INJECTION EVERY 8 HOURS
Status: DISCONTINUED | OUTPATIENT
Start: 2017-07-27 | End: 2017-08-10

## 2017-07-27 RX ORDER — HEPARIN SODIUM 1000 [USP'U]/ML
INJECTION, SOLUTION INTRAVENOUS; SUBCUTANEOUS
Status: DISCONTINUED | OUTPATIENT
Start: 2017-07-27 | End: 2017-07-27

## 2017-07-27 RX ORDER — NICARDIPINE HYDROCHLORIDE 0.2 MG/ML
1 INJECTION INTRAVENOUS CONTINUOUS
Status: DISCONTINUED | OUTPATIENT
Start: 2017-07-27 | End: 2017-08-08 | Stop reason: ALTCHOICE

## 2017-07-27 RX ORDER — ALBUMIN HUMAN 50 G/1000ML
25 SOLUTION INTRAVENOUS ONCE
Status: COMPLETED | OUTPATIENT
Start: 2017-07-27 | End: 2017-07-27

## 2017-07-27 RX ORDER — OXYCODONE HYDROCHLORIDE 5 MG/1
5 TABLET ORAL EVERY 4 HOURS PRN
Status: DISCONTINUED | OUTPATIENT
Start: 2017-07-27 | End: 2017-08-07

## 2017-07-27 RX ORDER — ASPIRIN 325 MG
325 TABLET, DELAYED RELEASE (ENTERIC COATED) ORAL DAILY
Status: DISCONTINUED | OUTPATIENT
Start: 2017-07-27 | End: 2017-08-04

## 2017-07-27 RX ORDER — FERROUS GLUCONATE 324(38)MG
324 TABLET ORAL
Status: DISCONTINUED | OUTPATIENT
Start: 2017-07-28 | End: 2017-08-05

## 2017-07-27 RX ORDER — ROCURONIUM BROMIDE 10 MG/ML
INJECTION, SOLUTION INTRAVENOUS
Status: DISCONTINUED | OUTPATIENT
Start: 2017-07-27 | End: 2017-07-27

## 2017-07-27 RX ORDER — OXYCODONE HYDROCHLORIDE 5 MG/1
10 TABLET ORAL EVERY 4 HOURS PRN
Status: DISCONTINUED | OUTPATIENT
Start: 2017-07-27 | End: 2017-08-07

## 2017-07-27 RX ORDER — PANTOPRAZOLE SODIUM 40 MG/10ML
40 INJECTION, POWDER, LYOPHILIZED, FOR SOLUTION INTRAVENOUS DAILY
Status: DISCONTINUED | OUTPATIENT
Start: 2017-07-27 | End: 2017-07-31

## 2017-07-27 RX ORDER — LIDOCAINE HCL/PF 100 MG/5ML
SYRINGE (ML) INTRAVENOUS
Status: DISCONTINUED | OUTPATIENT
Start: 2017-07-27 | End: 2017-07-27

## 2017-07-27 RX ORDER — PANTOPRAZOLE SODIUM 40 MG/1
40 TABLET, DELAYED RELEASE ORAL DAILY
Status: DISCONTINUED | OUTPATIENT
Start: 2017-07-27 | End: 2017-08-04

## 2017-07-27 RX ORDER — BISACODYL 10 MG
10 SUPPOSITORY, RECTAL RECTAL DAILY PRN
Status: DISCONTINUED | OUTPATIENT
Start: 2017-07-27 | End: 2017-09-22 | Stop reason: HOSPADM

## 2017-07-27 RX ORDER — ONDANSETRON 2 MG/ML
INJECTION INTRAMUSCULAR; INTRAVENOUS
Status: DISCONTINUED | OUTPATIENT
Start: 2017-07-27 | End: 2017-07-27

## 2017-07-27 RX ADMIN — MUPIROCIN: 20 OINTMENT TOPICAL at 09:07

## 2017-07-27 RX ADMIN — CEFAZOLIN 2 G: 1 INJECTION, POWDER, FOR SOLUTION INTRAVENOUS at 11:07

## 2017-07-27 RX ADMIN — SODIUM CHLORIDE, SODIUM GLUCONATE, SODIUM ACETATE, POTASSIUM CHLORIDE, MAGNESIUM CHLORIDE, SODIUM PHOSPHATE, DIBASIC, AND POTASSIUM PHOSPHATE: .53; .5; .37; .037; .03; .012; .00082 INJECTION, SOLUTION INTRAVENOUS at 08:07

## 2017-07-27 RX ADMIN — FLUCONAZOLE IN SODIUM CHLORIDE 400 MG: 2 INJECTION, SOLUTION INTRAVENOUS at 08:07

## 2017-07-27 RX ADMIN — SODIUM CHLORIDE 2 UNITS/HR: 9 INJECTION, SOLUTION INTRAVENOUS at 02:07

## 2017-07-27 RX ADMIN — DOBUTAMINE HYDROCHLORIDE 5 MCG/KG/MIN: 200 INJECTION INTRAVENOUS at 06:07

## 2017-07-27 RX ADMIN — ALBUMIN (HUMAN) 500 ML: 12.5 SOLUTION INTRAVENOUS at 05:07

## 2017-07-27 RX ADMIN — MIDAZOLAM HYDROCHLORIDE 2 MG: 1 INJECTION, SOLUTION INTRAMUSCULAR; INTRAVENOUS at 01:07

## 2017-07-27 RX ADMIN — LIDOCAINE HYDROCHLORIDE 40 MG: 20 INJECTION, SOLUTION INTRAVENOUS at 07:07

## 2017-07-27 RX ADMIN — PROPOFOL 25 MCG/KG/MIN: 10 INJECTION, EMULSION INTRAVENOUS at 10:07

## 2017-07-27 RX ADMIN — CALCIUM CHLORIDE 1 G: 100 INJECTION, SOLUTION INTRAVENOUS at 11:07

## 2017-07-27 RX ADMIN — ROCURONIUM BROMIDE 100 MG: 10 INJECTION, SOLUTION INTRAVENOUS at 07:07

## 2017-07-27 RX ADMIN — Medication 3 ML: at 09:07

## 2017-07-27 RX ADMIN — DEXTROSE MONOHYDRATE, SODIUM CHLORIDE, AND POTASSIUM CHLORIDE: 50; 4.5; 2.98 INJECTION, SOLUTION INTRAVENOUS at 02:07

## 2017-07-27 RX ADMIN — PROPOFOL 5 MCG/KG/MIN: 10 INJECTION, EMULSION INTRAVENOUS at 03:07

## 2017-07-27 RX ADMIN — POTASSIUM CHLORIDE 20 MEQ: 200 INJECTION, SOLUTION INTRAVENOUS at 03:07

## 2017-07-27 RX ADMIN — MIDAZOLAM HYDROCHLORIDE 4 MG: 1 INJECTION, SOLUTION INTRAMUSCULAR; INTRAVENOUS at 12:07

## 2017-07-27 RX ADMIN — FUROSEMIDE 10 MG: 10 INJECTION, SOLUTION INTRAMUSCULAR; INTRAVENOUS at 11:07

## 2017-07-27 RX ADMIN — EPINEPHRINE 0.1 MCG/KG/MIN: 1 INJECTION INTRAMUSCULAR; INTRAVENOUS; SUBCUTANEOUS at 07:07

## 2017-07-27 RX ADMIN — CEFAZOLIN 2 G: 1 INJECTION, POWDER, FOR SOLUTION INTRAVENOUS at 08:07

## 2017-07-27 RX ADMIN — SODIUM CHLORIDE: 0.9 INJECTION, SOLUTION INTRAVENOUS at 02:07

## 2017-07-27 RX ADMIN — PRAVASTATIN SODIUM 20 MG: 20 TABLET ORAL at 09:07

## 2017-07-27 RX ADMIN — VASOPRESSIN 0.02 UNITS/MIN: 20 INJECTION, SOLUTION INTRAMUSCULAR; SUBCUTANEOUS at 02:07

## 2017-07-27 RX ADMIN — MIDAZOLAM HYDROCHLORIDE 6 MG: 1 INJECTION, SOLUTION INTRAMUSCULAR; INTRAVENOUS at 07:07

## 2017-07-27 RX ADMIN — ALBUMIN HUMAN 25 G: 50 SOLUTION INTRAVENOUS at 06:07

## 2017-07-27 RX ADMIN — NICARDIPINE HYDROCHLORIDE 2.5 MG/HR: 0.2 INJECTION, SOLUTION INTRAVENOUS at 02:07

## 2017-07-27 RX ADMIN — NICARDIPINE HYDROCHLORIDE 2.5 MG/HR: 0.2 INJECTION, SOLUTION INTRAVENOUS at 11:07

## 2017-07-27 RX ADMIN — FENTANYL CITRATE 250 MCG: 50 INJECTION, SOLUTION INTRAMUSCULAR; INTRAVENOUS at 09:07

## 2017-07-27 RX ADMIN — Medication 3 ML: at 02:07

## 2017-07-27 RX ADMIN — ALBUMIN (HUMAN) 25 G: 12.5 SOLUTION INTRAVENOUS at 06:07

## 2017-07-27 RX ADMIN — CALCIUM CHLORIDE 0.5 G: 100 INJECTION, SOLUTION INTRAVENOUS at 09:07

## 2017-07-27 RX ADMIN — CEFEPIME HYDROCHLORIDE 1 G: 1 INJECTION, SOLUTION INTRAVENOUS at 08:07

## 2017-07-27 RX ADMIN — FENTANYL CITRATE 25 MCG: 50 INJECTION INTRAMUSCULAR; INTRAVENOUS at 09:07

## 2017-07-27 RX ADMIN — FENTANYL CITRATE 500 MCG: 50 INJECTION, SOLUTION INTRAMUSCULAR; INTRAVENOUS at 08:07

## 2017-07-27 RX ADMIN — PROTAMINE SULFATE 5 MG: 10 INJECTION, SOLUTION INTRAVENOUS at 11:07

## 2017-07-27 RX ADMIN — VANCOMYCIN HYDROCHLORIDE 750 MG: 1 INJECTION, POWDER, LYOPHILIZED, FOR SOLUTION INTRAVENOUS at 08:07

## 2017-07-27 RX ADMIN — SODIUM CHLORIDE, SODIUM GLUCONATE, SODIUM ACETATE, POTASSIUM CHLORIDE, MAGNESIUM CHLORIDE, SODIUM PHOSPHATE, DIBASIC, AND POTASSIUM PHOSPHATE: .53; .5; .37; .037; .03; .012; .00082 INJECTION, SOLUTION INTRAVENOUS at 11:07

## 2017-07-27 RX ADMIN — ROCURONIUM BROMIDE 50 MG: 10 INJECTION, SOLUTION INTRAVENOUS at 09:07

## 2017-07-27 RX ADMIN — SODIUM CHLORIDE, PRESERVATIVE FREE 3 ML: 5 INJECTION INTRAVENOUS at 06:07

## 2017-07-27 RX ADMIN — PANTOPRAZOLE SODIUM 40 MG: 40 INJECTION, POWDER, FOR SOLUTION INTRAVENOUS at 03:07

## 2017-07-27 RX ADMIN — EPINEPHRINE 0.09 MCG/KG/MIN: 1 INJECTION PARENTERAL at 02:07

## 2017-07-27 RX ADMIN — HEPARIN SODIUM 25000 UNITS: 1000 INJECTION, SOLUTION INTRAVENOUS; SUBCUTANEOUS at 09:07

## 2017-07-27 RX ADMIN — FENTANYL CITRATE 250 MCG: 50 INJECTION, SOLUTION INTRAMUSCULAR; INTRAVENOUS at 07:07

## 2017-07-27 RX ADMIN — ASPIRIN 325 MG ORAL TABLET 325 MG: 325 PILL ORAL at 05:07

## 2017-07-27 RX ADMIN — ALBUMIN (HUMAN): 12.5 SOLUTION INTRAVENOUS at 12:07

## 2017-07-27 RX ADMIN — ALBUMIN (HUMAN) 500 ML: 12.5 SOLUTION INTRAVENOUS at 02:07

## 2017-07-27 RX ADMIN — VASOPRESSIN 0.04 UNITS/MIN: 20 INJECTION, SOLUTION INTRAMUSCULAR; SUBCUTANEOUS at 10:07

## 2017-07-27 NOTE — PROGRESS NOTES
Arrhythmia clinic  Pt out of OR.  Tachy detection and therapies of ICD restored at bedside.  Order received to change base rate to 80 bpm per Dr. Downing.   Changed from DOO 60 bpm to DDD 80 bpm.  Pt is followed elsewhere.

## 2017-07-27 NOTE — PROGRESS NOTES
[facility]  Heart Transplant  Progress Note    Patient Name: Suman Hayden  MRN: [id number]  Admission Date: 7/18/2017  Hospital Length of Stay: 9 days  Attending Physician: Sunny Downing MD  Primary Care Provider: Joe Ernst MD  Principal Problem:Acute on chronic combined systolic and diastolic heart failure    Subjective:     Interval History: NAEON. LVAD sx planned for today.    Continuous Infusions:   sodium chloride 0.9%      dextrose 5 % and 0.45 % NaCl with KCl 40 mEq      DOBUTamine 5 mcg/kg/min (07/27/17 1400)    epinephrine infusion (NON-TITRATING) 0.09 mcg/kg/min (07/27/17 1400)    insulin (HUMAN R) infusion (adults)      nicardipine      propofol      vasopressin (PITRESSIN) infusion 0.02 Units/min (07/27/17 1400)     Scheduled Meds:   aspirin  325 mg Oral Daily    aspirin  325 mg Per NG tube Daily    ceFEPime (MAXIPIME) IVPB  1 g Intravenous Q8H    docusate sodium  200 mg Oral QHS    [START ON 7/28/2017] ferrous gluconate  324 mg Oral Daily with breakfast    [START ON 7/28/2017] fluconazole (DIFLUCAN) IVPB  400 mg Intravenous Q24H    mupirocin   Nasal BID    pantoprazole  40 mg Oral Daily    pantoprazole  40 mg Intravenous Daily    pravastatin  20 mg Oral QHS    sodium chloride 0.9%  3 mL Intravenous Q8H    [START ON 7/28/2017] vancomycin (VANCOCIN) IVPB  750 mg Intravenous Q24H     PRN Meds:albumin human 5%, albuterol sulfate, albuterol sulfate, bisacodyl, dextrose 50%, dextrose 50%, magnesium hydroxide 400 mg/5 ml, magnesium sulfate IVPB, oxycodone, oxycodone, potassium chloride, potassium chloride    Review of patient's allergies indicates:  No Known Allergies  Objective:     Vital Signs (Most Recent):  Temp: 97.5 °F (36.4 °C) (07/27/17 1400)  Pulse: 60 (07/27/17 1400)  Resp: 14 (07/27/17 1400)  BP: 136/77 (07/27/17 0600)  SpO2: 100 % (07/27/17 1400) Vital Signs (24h Range):  Temp:  [97.5 °F (36.4 °C)-98.2 °F (36.8 °C)] 97.5 °F (36.4 °C)  Pulse:  [59-72]  60  Resp:  [11-32] 14  SpO2:  [94 %-100 %] 100 %  BP: (110-165)/(57-87) 136/77  Arterial Line BP: ()/(56-79) 77/56     Weight: 64.2 kg (141 lb 8.6 oz)  Body mass index is 21.52 kg/m².      Intake/Output Summary (Last 24 hours) at 07/27/17 1415  Last data filed at 07/27/17 1400   Gross per 24 hour   Intake           2853.1 ml   Output             1916 ml   Net            937.1 ml       Hemodynamic Parameters:  PAP: (40-41)/(17-19) 40/19  PAP (Mean):  [25 mmHg-26 mmHg] 25 mmHg  CO:  [4 L/min] 4 L/min  CI:  [2.3 L/min/m2] 2.3 L/min/m2    Telemetry: no events    Physical Exam   Not seen as the patient went for LVAD sx.    Significant Labs:  CBC:    Recent Labs  Lab 07/27/17  0640  07/27/17  1328   WBC 6.10  --   --    RBC 4.63  --   --    HGB 13.1*  --   --    HCT 38.6*  < > 24*   *  --   --    MCV 83  --   --    MCH 28.3  --   --    MCHC 33.9  --   --    < > = values in this interval not displayed.  BNP:  No results for input(s): BNP in the last 72 hours.    Invalid input(s): BNPTRIAGELBLO  CMP:    Recent Labs  Lab 07/27/17  0558 07/27/17  1324     103 183*   CALCIUM 8.7  8.7 8.0*   ALBUMIN 3.0*  3.0*  --    PROT 7.2  7.2  --    *  130* 132*   K 3.8  3.8 4.0   CO2 25  25 20*   CL 91*  91* 97   BUN 28*  28* 28*   CREATININE 1.4  1.4 1.4   ALKPHOS 59  59  --    ALT 11  11  --    AST 19  19  --    BILITOT 2.0*  2.0*  --       Coagulation:     Recent Labs  Lab 07/27/17  1324   INR 1.4*   APTT 31.0     LDH:  No results for input(s): LDH in the last 72 hours.  Microbiology:  Microbiology Results (last 7 days)     Procedure Component Value Units Date/Time    Blood culture [[id number]] Collected:  07/24/17 1100    Order Status:  Completed Specimen:  Blood from Peripheral, Antecubital, Left Updated:  07/27/17 1412     Blood Culture, Routine No Growth to date     Blood Culture, Routine No Growth to date     Blood Culture, Routine No Growth to date     Blood Culture, Routine No Growth to  date    Blood culture [[id number]] Collected:  07/24/17 1300    Order Status:  Completed Specimen:  Blood from Peripheral, Hand, Right Updated:  07/26/17 1812     Blood Culture, Routine No Growth to date     Blood Culture, Routine No Growth to date     Blood Culture, Routine No Growth to date    Urine culture [[id number]] Collected:  07/24/17 1139    Order Status:  Completed Specimen:  Urine from Urine, Clean Catch Updated:  07/26/17 0245     Urine Culture, Routine --     ENTEROCOCCUS SPECIES  >100,000 cfu/ml  Identification and susceptibility pending            I have reviewed all pertinent labs within the past 24 hours.    Estimated Creatinine Clearance: 46.5 mL/min (based on Cr of 1.4).    Diagnostic Results:  I have reviewed and interpreted all pertinent imaging results/findings within the past 24 hours.    Assessment and Plan:     * Acute on chronic combined systolic and diastolic heart failure    - LVAD placement 7/27/17  - CTS Primary post-surgery           AICD discharge    - appropriate in the setting of VT aggravated by underlying AT/AFL        V-tach    - amiodarone 400 mg PO BID until 8/2/17 the 400 mg QD  - monitor lytes; keep K>4.0, Mg>2.0  - appreciate EP recs        Atrial tachycardia    - [id number] - 3  - c/w amiodarone  - c/w heparin gtt        INDIRA (acute kidney injury)    - pre-renal due to hypervolemia  - c/w lasix gtt  - improving  - avoid nephrotoxic agents        Hepatitis B core antibody positive since 2012    - will defer liver biopsy as CTS not requiring it  - ID consult cleared the patient for sx  - no evidence of liver failure  - TBILI trending down  - AST/ALT WNL  - case discussed with hepatology, low suspicion for liver involvement        Urine culture positive    - patient asymptomatic  - UA - pyuria, bacteruria  - Ucx + enterococci. Sensitivities pending  - recalled ID        Hyperlipidemia    - c/w pravastatin            Lorena Payton MD  Heart Transplant  [facility]  [place]

## 2017-07-27 NOTE — H&P
History & Physical  Surgical Intensive Care    History was obtained via chart review    SUBJECTIVE:     Chief Complaint/Reason for Admission: LVAD    History of Present Illness:  Patient is a 67 y.o. male with NICM (EF 10%) on home dobutamine at 5 mcg/kg/hr and acute on chronic systolic and diastolic heart failure (NYHA class IV), HTN, hyperlipidemia s/p Medtronic CRT-D. He was originally in the process of getting a VAD but was diagnosed with Hep B, for which hepatology recommended repeat viral studies/US and liver biopsy. [place] [facility] [facility] [place]. He went to the OR today for LVAD insertion.    He presents to the SICU intubated and on the vent, on dobutamine gtt 5, Epi 0.9, cardene 2.5mg/hr, vaso 0.02, propofol 10.    PTA Medications   Medication Sig    amiodarone (PACERONE) 200 MG Tab Take by mouth once daily.    aspirin 81 MG Chew Take 81 mg by mouth once daily.    DOBUTamine (DOBUTREX) 1,000 mg/250 mL (4,000 mcg/mL) infusion Inject 414 mcg/min into the vein continuous.    furosemide (LASIX) 40 MG tablet Take 1 tablet (40 mg total) by mouth 2 (two) times daily.    potassium chloride SA (K-DUR,KLOR-CON) 20 MEQ tablet Take 20 mEq by mouth once daily.     pravastatin (PRAVACHOL) 20 MG tablet Take 20 mg by mouth every evening.    spironolactone (ALDACTONE) 25 MG tablet Take 1 tablet (25 mg total) by mouth once daily.     Review of patient's allergies indicates:  No Known Allergies    Past Medical History:   Diagnosis Date    Cardiomyopathy     Hyperlipidemia     Hypertension     Obesity     S/P implantation of automatic cardioverter/defibrillator (AICD)     Ventricular tachycardia      Past Surgical History:   Procedure Laterality Date    CARDIAC CATHETERIZATION      CARDIAC DEFIBRILLATOR PLACEMENT       History reviewed. No pertinent family history.  Social History   Substance Use Topics    Smoking status: Never Smoker    Smokeless tobacco: Never Used    Alcohol use No        Review  of Systems:  Review of systems not obtained due to patient factors sedated and intubated..    OBJECTIVE:     Vital Signs (Most Recent)  Temp: 97.9 °F (36.6 °C) (07/27/17 1700)  Pulse: 80 (07/27/17 1745)  Resp: 16 (07/27/17 1745)  BP: 136/77 (07/27/17 0600)  SpO2: (!) 93 % (07/27/17 1745)  Ventilator Data (Last 24H):     Vent Mode: SIMV  Oxygen Concentration (%):  [] 50  Resp Rate Total:  [14 br/min-16 br/min] 16 br/min  Vt Set:  [500 mL] 500 mL  PEEP/CPAP:  [5 cmH20] 5 cmH20  Pressure Support:  [10 cmH20] 10 cmH20  Mean Airway Pressure:  [9 cmH20-9.5 cmH20] 9.5 cmH20    Hemodynamic Parameters (Last 24H):  PAP: (39-43)/(17-31) 43/22  PAP (Mean):  [25 mmHg-35 mmHg] 29 mmHg  CO:  [4 L/min-4.6 L/min] 4.2 L/min  CI:  [2.3 L/min/m2-2.6 L/min/m2] 2.4 L/min/m2    Physical Exam   Constitutional: He appears well-developed and well-nourished. No distress.   HENT:   Head: Normocephalic and atraumatic.   Eyes: Right eye exhibits no discharge. Left eye exhibits no discharge. No scleral icterus.   Neck: Normal range of motion. Neck supple.   Cardiovascular: Intact distal pulses.    LVAD hum present   Pulmonary/Chest: Effort normal. No respiratory distress. He has wheezes.   R and L meds chest tubes in place.  Bandage over sternotomy incision   Abdominal: Soft. He exhibits no distension.   Skin: Skin is warm and dry.         Lines/Drains:       Introducer 07/27/17 0731 (Active)   Specific Qualities [place] in place 7/27/2017  1:30 PM   Dressing Status Biopatch in place;Clean;Dry;Intact 7/27/2017  1:30 PM   Dressing Change Due 08/03/17 7/27/2017  1:30 PM   Daily Line Review Performed 7/27/2017  1:30 PM   Number of days: 0            Pulmonary Artery Catheter Assessment  07/27/17 0731 (Active)   Dressing biopatch in place;dressing dry and intact 7/27/2017  1:30 PM   Securement secured w/ sutures 7/27/2017  1:30 PM   Current Insertion Depth (cm) 59 cm 7/27/2017  1:30 PM   Phlebitis 0-->no symptoms 7/27/2017  1:30 PM   Infiltration  0-->no symptoms 7/27/2017  1:30 PM   Waveform normal 7/27/2017  1:30 PM   Pressure Catheter Interventions line leveled/zeroed 7/27/2017  1:30 PM   Prox Injectate Status Infusing 7/27/2017  1:30 PM   Prox Infusate Status Normal saline locked 7/27/2017  1:30 PM   Distal Status Blood return noted;Flushed;Infusing 7/27/2017  1:30 PM   Daily Line Review Performed 7/27/2017  1:30 PM   Number of days: 0            Percutaneous Central Line Insertion/Assessment - quad lumen  07/27/17 0731 right internal jugular;other (see comments) (Active)   Dressing biopatch in place;dressing dry and intact 7/27/2017  1:30 PM   Securement secured w/ sutures 7/27/2017  1:30 PM   Additional Site Signs no erythema;no warmth;no edema;no pain;no palpable cord;no streak formation;no drainage 7/27/2017  1:30 PM   Distal Patency/Care blood return present;flushed w/o difficulty;normal saline locked 7/27/2017  1:30 PM   Medial 1 Patency/Care flushed w/o difficulty;blood return present;normal saline lock 7/27/2017  1:30 PM   Medial 2 Patency/Care blood return present;flushed w/o difficulty;infusing 7/27/2017  1:30 PM   Proximal Patency/Care blood return present;flushed w/o difficulty;infusing 7/27/2017  1:30 PM   Waveform other (see comments) 7/27/2017  1:30 PM   Dressing Change Due 08/03/17 7/27/2017  1:30 PM   Daily Line Review Performed 7/27/2017  1:30 PM   Number of days: 0            Peripheral IV - Single Lumen 07/24/17 1630 Left Antecubital (Active)   Site Assessment Clean;Dry;Intact;No redness;No swelling 7/27/2017  1:30 PM   Line Status Flushed;Infusing 7/27/2017  1:30 PM   Dressing Status Old drainage;Dry;Intact 7/27/2017  1:30 PM   Dressing Intervention New dressing 7/24/2017  4:30 PM   Site Change Due 07/28/17 7/26/2017  7:01 PM   Reason Not Rotated Not due 7/27/2017  1:30 PM   Number of days: 3            Peripheral IV - Single Lumen 07/25/17 2000 Left Wrist (Active)   Site Assessment Clean;Dry;Intact 7/27/2017  1:30 PM   Line Status  Flushed;Infusing 7/27/2017  1:30 PM   Dressing Status Clean;Dry;Intact 7/27/2017  1:30 PM   Dressing Intervention New dressing 7/25/2017 11:00 PM   Site Change Due 07/29/17 7/26/2017  7:01 PM   Reason Not Rotated Not due 7/27/2017  1:30 PM   Number of days: 1            Arterial Line 07/27/17 0716 Left Other (Comment) (Active)   Site Assessment Clean;Dry;Intact;No redness;No swelling 7/27/2017  1:30 PM   Line Status Pulsatile blood flow 7/27/2017  1:30 PM   Art Line Waveform Appropriate 7/27/2017  1:30 PM   Arterial Line Interventions Zeroed and calibrated;Leveled;Connections checked and tightened;Flushed per protocol 7/27/2017  1:30 PM   Color/Movement/Sensation Capillary refill less than 3 sec 7/27/2017  1:30 PM   Dressing Type Transparent 7/27/2017  1:30 PM   Dressing Status Clean;Dry;Intact 7/27/2017  1:30 PM   Dressing Intervention Dressing changed 7/27/2017  1:30 PM   Dressing Change Due 07/31/17 7/27/2017  1:30 PM   Number of days: 0            IABP 07/18/17 (Active)   Site Assessment Clean;Dry;Intact;No redness;No swelling 7/27/2017  1:30 PM   Helium Tubing Clear 7/27/2017  6:00 AM   Arterial Line Status Arterial fluids per protocol;Intact and in place 7/27/2017  1:30 PM   Arterial Line Interventions Leveled;Connections checked and tightened;Zeroed and calibrated 7/27/2017  6:00 AM   Positioning Head of bed flat 7/27/2017  1:30 PM   Dressing Occlusive 7/27/2017  1:30 PM   Dressing Status Clean;Dry;Intact 7/27/2017  1:30 PM   Dressing Intervention New dressing 7/25/2017 12:00 PM   Dressing Change Due 08/01/17 7/27/2017  6:00 AM   Color/Movement/Sensation Capillary refill less than 3 sec 7/27/2017  6:00 AM   Number of days: 9            VAD 07/27/17 1312 Left ventricular assist device HeartMate 3 (Active)   Site Location Abdomen right 7/27/2017  1:30 PM   Site Assessment Other (Comment) 7/27/2017  1:30 PM   Dressing Status Clean;Dry;Intact 7/27/2017  1:30 PM   Dressing Intervention New dressing 7/27/2017  1:30  PM   Dressing Change Due 07/29/17 7/27/2017  1:30 PM   [place] in use Singer pepe 7/27/2017  1:30 PM   Power Source External DC 7/27/2017  1:30 PM   Equipment inventory at  Admission 7/27/2017  1:00 PM   Pump type HeartMate3 7/27/2017  1:30 PM   Battery 1 [id number] 7/27/2017  1:00 PM   Battery 2 [id number] 7/27/2017  1:00 PM   Battery 3 [id number] 7/27/2017  1:00 PM   Battery 4 [id number] 7/27/2017  1:00 PM   Battery 5 [id number] 7/27/2017  1:00 PM   Battery 6 [id number] 7/27/2017  1:00 PM   Battery 7 [id number] 7/27/2017  1:00 PM   Battery 8 [id number] 7/27/2017  1:00 PM   AC Adapter 1 MPU POWER CABLE [id number] 7/27/2017  1:00 PM   Battery [facility] [id number] 7/27/2017  1:00 PM   Primary Controller [id number] 7/27/2017  1:00 PM   Extra Controller [id number] 7/27/2017  1:00 PM   PM [id number] 7/27/2017  1:00 PM   Battery Clips [id number] X4 7/27/2017  1:00 PM   Number of days: 0            Chest Tube 07/27/17 1215 1 Right Mediastinal 32 Fr. (Active)   Chest Tube WDL ex 7/27/2017  1:30 PM   Function -20 cm H2O 7/27/2017  1:30 PM   Air Leak/Fluctuation air leak not present;fluctuation not present;dependent drainage cleared 7/27/2017  1:30 PM   Safety all tubing connections taped;all connections secured;suction checked 7/27/2017  1:30 PM   Securement tubing anchored to body distal to insertion site w/ tape 7/27/2017  1:30 PM   Patency Intervention Milked;Stripped;Tip/tilt 7/27/2017  1:30 PM   Drainage Description Sanguineous 7/27/2017  1:30 PM   Dressing Appearance occlusive gauze dressing intact;clean and dry 7/27/2017  1:30 PM   Left Subcutaneous Emphysema none present 7/27/2017  1:30 PM   Right Subcutaneous Emphysema none present 7/27/2017  1:30 PM   Site Assessment Not assessed 7/27/2017  1:30 PM   Output (mL) 0 mL 7/27/2017  5:00 PM   Number of days: 0            Chest Tube 07/27/17 1216 2 Left Mediastinal 32 Fr. (Active)   Chest Tube WDL ex 7/27/2017  1:30 PM   Function -20 cm H2O 7/27/2017  1:30 PM   Air Leak/Fluctuation air  leak not present;fluctuation not present;dependent drainage cleared 7/27/2017  1:30 PM   Safety all tubing connections taped;all connections secured;suction checked 7/27/2017  1:30 PM   Securement tubing anchored to body distal to insertion site w/ tape 7/27/2017  1:30 PM   Patency Intervention Milked;Stripped;Tip/tilt 7/27/2017  1:30 PM   Drainage Description Sanguineous 7/27/2017  1:30 PM   Dressing Appearance occlusive gauze dressing intact;clean and dry 7/27/2017  1:30 PM   Left Subcutaneous Emphysema none present 7/27/2017  1:30 PM   Right Subcutaneous Emphysema none present 7/27/2017  1:30 PM   Site Assessment Clean;Not assessed 7/27/2017  1:30 PM   Output (mL) 24 mL 7/27/2017  5:00 PM   Number of days: 0            NG/OG Tube 07/27/17 1400 Center mouth (Active)   Placement Check placement verified by distal tube length measurement;placement verified by x-ray 7/27/2017  2:00 PM   Distal Tube Length (cm) 75 7/27/2017  2:00 PM   Tolerance no signs/symptoms of discomfort 7/27/2017  2:00 PM   Securement taped to commercial device 7/27/2017  2:00 PM   Clamp Status/Tolerance unclamped;no abdominal discomfort;no abdominal distention;no emesis;no nausea;no residual;no restlessness 7/27/2017  2:00 PM   Suction Setting/Drainage Method suction initiated at;low;intermittent setting 7/27/2017  2:00 PM   Insertion Site Appearance no redness, warmth, tenderness, skin breakdown, drainage 7/27/2017  2:00 PM   Drainage Bloody 7/27/2017  2:00 PM   Flush/Irrigation flushed w/;water;no resistance met 7/27/2017  2:00 PM   Intake (mL) 30 mL 7/27/2017  5:00 PM   Number of days: 0            Urethral Catheter 07/27/17 0757 Non-latex;Straight-tip;Temperature probe 16 Fr. (Active)   Site Assessment Clean;Intact 7/27/2017  1:30 PM   Collection Container Urimeter 7/27/2017  1:30 PM   Securement Method secured to top of thigh w/ adhesive device 7/27/2017  1:30 PM   Catheter Care Performed yes 7/27/2017  1:30 PM   Reason for Continuing  "Urinary Catheterization Post operative;Critically ill in ICU requiring intensive monitoring 7/27/2017  1:30 PM   CAUTI Prevention Bundle StatLock in place w 1" slack;Intact seal between catheter & drainage tubing;Drainage bag off the floor;Green sheeting clip in use;No dependent loops or kinks;Drainage bag not overfilled (<2/3 full);Drainage bag below bladder 7/27/2017  1:30 PM   Output (mL) 80 mL 7/27/2017  5:00 PM   Number of days: 0       Laboratory  CBC:   Recent Labs  Lab 07/27/17 1955   WBC 8.66   RBC 2.59*   HGB 7.3*   HCT 21.8*   PLT 72*   MCV 84   MCH 28.2   MCHC 33.5     CMP:   Recent Labs  Lab 07/27/17 0558 07/27/17 1955     103  < > 149*   CALCIUM 8.7  8.7  < > 8.6*   ALBUMIN 3.0*  3.0*  --   --    PROT 7.2  7.2  --   --    *  130*  < > 134*   K 3.8  3.8  < > 4.0   CO2 25  25  < > 20*   CL 91*  91*  < > 100   BUN 28*  28*  < > 27*   CREATININE 1.4  1.4  < > 1.4   ALKPHOS 59  59  --   --    ALT 11  11  --   --    AST 19  19  --   --    BILITOT 2.0*  2.0*  --   --    < > = values in this interval not displayed.  LFTs:   Recent Labs  Lab 07/27/17  0558   ALT 11  11   AST 19  19   ALKPHOS 59  59   BILITOT 2.0*  2.0*   PROT 7.2  7.2   ALBUMIN 3.0*  3.0*     ABGs:   Recent Labs  Lab 07/27/17 2209   PH 7.455*   PCO2 33.4*   PO2 237*   HCO3 23.4*   POCSATURATED 100   BE 0         Chest X-Ray: I personally reviewed the films and findings are:stable, pacer and LVAD in place    ASSESSMENT/PLAN:     Plan:    Neuro:   -Pain control fentanyl and oxycodone  -propofol for sedation    Pulmonary:   -intubated   Vent Mode: SIMV  Oxygen Concentration (%):  [] 50  Resp Rate Total:  [14 br/min-16 br/min] 16 br/min  Vt Set:  [500 mL] 500 mL  PEEP/CPAP:  [5 cmH20] 5 cmH20  Pressure Support:  [10 cmH20] 10 cmH20  Mean Airway Pressure:  [9 cmH20-9.5 cmH20] 9.5 cmH20    Recent Labs  Lab 07/27/17  1708   PH 7.397   PCO2 41.0   PO2 262*   HCO3 25.2   POCSATURATED 100   BE 0     Chest tubes " - R meds 250 since arrival to the floor, L meds 81.   May wean to extubate after OR tomorrow    Cardiac:  -MAP range goal >65  -Dobutamine gtt of 5,   -Epi of 0.9  -cardene 2.5mg/hr  -vaso 0.02  -propofol 10  -wean as tolerated    Renal:   -Singer in place  -Bun/Cr 37/1.4  -UOP 0.7 cc/kg/hr    Fluids/Electrolytes/Nutrition/GI:   -Nutritional status: NPO  -NGT for meds    Hematology/Oncology:  -H/H 7.3/21.8  -INR/Plts 1.3/72  -DVT ppx: hold post op  -Aspirin 325mg    Infectious Disease:   -Afebrile   -WBC 8.66  -Ancef, cefepime, doxycycline, fluconazole, vanc  -Cultures NGTD    Endocrine:  -Glucose 180s, goal of 140-180  -insulin gtt, accuchecks q1    Dispo:  -Continue care in the ICU setting  -To OR tomorrow for closure of sternotomy    Vince Murguia PGY-1  [phone]  07/27/2017

## 2017-07-27 NOTE — PLAN OF CARE
Problem: Patient Care Overview  Goal: Plan of Care Review  Outcome: Ongoing (interventions implemented as appropriate)  VSS stable within the shift. On Paced Rhythm. Prepped for VAD this AM, clipped and bathed, type and screen sent off and consents in chart. Patient was maintained on NPO since midnight. IABP settings maintained at following settings: 1:1, Auto and EKG trigger. Slight oozing from IABP site, changed dressing.Heparin stopped since 3am. Lasix infusion maintained at 15mg/hr. Dobutamine infusion maintained at 5mcg/kg/hr.1 BM within the shift.  Will continue to monitor.

## 2017-07-27 NOTE — SUBJECTIVE & OBJECTIVE
Interval HPI:       PMH, PSH, FH, SH updated and reviewed       Review of Systems   Unable to perform ROS: Intubated       Current Medications and/or Treatments Impacting Glycemic Control  Immunotherapy:    Immunosuppressants     None        Steroids:   Hormones     None        Pressors:    Autonomic Drugs     None        Hyperglycemia/Diabetes Medications:   Antihyperglycemics     Start     Stop Route Frequency Ordered    07/27/17 1445  insulin regular (Humulin R) 100 Units in sodium chloride 0.9% 100 mL infusion      -- IV Continuous 07/27/17 1346               PHYSICAL EXAMINATION:Vitals:    07/27/17 1345   BP:    Pulse: 72   Resp: 14   Temp:      Body mass index is 21.52 kg/m².    Physical Exam   Constitutional: He appears well-developed and well-nourished. He is sedated and intubated.   HENT:   Head: Normocephalic and atraumatic.   Eyes: EOM are normal. Pupils are equal, round, and reactive to light.   Neck: Neck supple. No tracheal deviation present.   Cardiovascular:   VAD hum   Pulmonary/Chest: Effort normal. He is intubated.   VAD hum   Abdominal: Soft. Bowel sounds are decreased.   Neurological:   MARIE intubated and sedated   Skin: Skin is warm and dry.   Midsternum incision open, dressing occlusive and intact    Psychiatric:   MARIE intubated and sedated

## 2017-07-27 NOTE — ANESTHESIA PROCEDURE NOTES
Arterial    Diagnosis: CHF    Patient location during procedure: done in OR  Procedure start time: 7/27/2017 7:16 AM  Timeout: 7/27/2017 7:15 AM  Procedure end time: 7/27/2017 7:20 AM  Staffing  Anesthesiologist: JOHN EMMANUEL  Resident/CRNA: ESTRELLITA BETH  Performed: resident/CRNA   Anesthesiologist was present at the time of the procedure.  Preanesthetic Checklist  Completed: patient identified, site marked, surgical consent, pre-op evaluation, timeout performed, IV checked, risks and benefits discussed, monitors and equipment checked and anesthesia consent givenArterial  Skin Prep: chlorhexidine gluconate  Local Infiltration: none  Orientation: left  Location: brachial  Catheter Size: 20 G  Catheter placement by Ultrasound guidance. Heme positive aspiration all ports.  Vessel Caliber: medium, patent, compressibility normal  Needle advanced into vessel with real time Ultrasound guidance.  Sterile sheath used.Insertion Attempts: 1  Assessment  Dressing: secured with tape and tegaderm  Patient: Tolerated well

## 2017-07-27 NOTE — ANESTHESIA PROCEDURE NOTES
[place]    Diagnosis: CHF  Patient location during procedure: done in OR  Procedure start time: 7/27/2017 7:31 AM  Timeout: 7/27/2017 7:30 AM  Procedure end time: 7/27/2017 8:05 AM  Staffing  Anesthesiologist: JOHN EMMANUEL  Resident/CRNA: ESTRELLITA BETH  Performed: resident/CRNA   Anesthesiologist was present at the time of the procedure.  Preanesthetic Checklist  Completed: patient identified, site marked, surgical consent, pre-op evaluation, timeout performed, IV checked, risks and benefits discussed, monitors and equipment checked and anesthesia consent given  [place]  Skin Prep: chlorhexidine gluconate  Local Infiltration: none  Location: right,  internal jugular vein  Vessel Caliber: medium, patent, compressibility normal  Introducer: 9 Fr single lumen, manometry used.  Device: CCO/Oximetric Catheter  Catheter Size: 8 Fr  Catheter placement by yes. Heme positive aspiration all ports. PAC floated with balloon up not wedgedSterile sheath used  Locked at: 9 cm.Insertion Attempts: 2  Indication: intravenous therapy, hemodynamic monitoring  Ultrasound Guidance  Needle advanced into vessel with real time Ultrasound guidance.  Guidewire confirmed in vessel.  Sterile sheath used.  Assessment  Central Line Bundle Protocol followed. Hand hygiene before procedure, surgical cap worn, surgical mask worn, sterile surgical gloves worn, large sterile drape used.  Verification: blood return  Dressing: sutured in place and taped  Patient: Tolerated Well

## 2017-07-27 NOTE — CONSULTS
[facility]  Endocrinology  Diabetes Consult Note    Consult Requested by: Sunny Downing MD   Reason for admit: Acute on chronic combined systolic and diastolic heart failure    HISTORY OF PRESENT ILLNESS:  Reason for Consult: Management of  Hyperglycemia     Surgical Procedure and Date: 7/27/17 s/p LAVD     HPI: 67 y.o. gentleman with PNICM (EF 10%) on home  at 5 mcg/kg/min, esophagitis, HTN, HLD, s/p Medtronic CRT-D,SOB (NYHA Class III), 3 pillow orthopnea. He is now s/p LVAD placement and endocrine in consulted for bg management.     Interval HPI: intubated and sedated from OR, vasopressin gtt infusing, no hypoglycemia, NPO  bg 181    PMH, PSH, FH, SH updated and reviewed       Review of Systems   Unable to perform ROS: Intubated       Current Medications and/or Treatments Impacting Glycemic Control  Immunotherapy:    Immunosuppressants     None        Steroids:   Hormones     None        Pressors:    Autonomic Drugs     None        Hyperglycemia/Diabetes Medications:   Antihyperglycemics     Start     Stop Route Frequency Ordered    07/27/17 1445  insulin regular (Humulin R) 100 Units in sodium chloride 0.9% 100 mL infusion      -- IV Continuous 07/27/17 1346               PHYSICAL EXAMINATION:Vitals:    07/27/17 1345   BP:    Pulse: 72   Resp: 14   Temp:      Body mass index is 21.52 kg/m².    Physical Exam   Constitutional: He appears well-developed and well-nourished. He is sedated and intubated.   HENT:   Head: Normocephalic and atraumatic.   Eyes: EOM are normal. Pupils are equal, round, and reactive to light.   Neck: Neck supple. No tracheal deviation present.   Cardiovascular:   VAD hum   Pulmonary/Chest: Effort normal. He is intubated.   VAD hum   Abdominal: Soft. Bowel sounds are decreased.   Neurological:   MARIE intubated and sedated   Skin: Skin is warm and dry.   Midsternum incision open, dressing occlusive and intact    Psychiatric:   MARIE intubated and sedated           Labs  Reviewed and Include     Recent Labs  Lab 07/27/17  0558 07/27/17  1324     103 183*   CALCIUM 8.7  8.7 8.0*   ALBUMIN 3.0*  3.0*  --    PROT 7.2  7.2  --    *  130* 132*   K 3.8  3.8 4.0   CO2 25  25 20*   CL 91*  91* 97   BUN 28*  28* 28*   CREATININE 1.4  1.4 1.4   ALKPHOS 59  59  --    ALT 11  11  --    AST 19  19  --    BILITOT 2.0*  2.0*  --      Lab Results   Component Value Date    WBC 11.84 07/27/2017    HGB 7.7 (L) 07/27/2017    HCT 24 (L) 07/27/2017    MCV 85 07/27/2017    PLT 79 (L) 07/27/2017     No results for input(s): TSH, FREET4 in the last 168 hours.  Lab Results   Component Value Date    HGBA1C 5.4 07/06/2017       Nutritional status:   Body mass index is 21.52 kg/m².  Lab Results   Component Value Date    ALBUMIN 3.0 (L) 07/27/2017    ALBUMIN 3.0 (L) 07/27/2017    ALBUMIN 2.9 (L) 07/26/2017     Lab Results   Component Value Date    PREALBUMIN 18 (L) 07/06/2017       Estimated Creatinine Clearance: 46.5 mL/min (based on Cr of 1.4).    Accu-Checks  No results for input(s): POCTGLUCOSE in the last 72 hours.     ASSESSMENT and PLAN    Hyperglycemia    bg goal 140-180,  on arrival   Start intensive insulin gtt at 1 u/h and monitor bg q1h          INDIRA (acute kidney injury)    Body mass index is 21.52 kg/m².  Avoid hypoglycemia           CHF (NYHA class IV, ACC/AHA stage D)    S/p lvad          Nonischemic cardiomyopathy    S/p lvad            * Acute on chronic combined systolic and diastolic heart failure    S/p lvad              Plan discussed with RN at bedside.     Jasmyn Dick, DNP, NP  Endocrinology  [facility]

## 2017-07-27 NOTE — TRANSFER OF CARE
"Anesthesia Transfer of Care Note    Patient: Suman Hayden    Procedure(s) Performed: Procedure(s) (LRB):  INSERTION-LEFT VENTRICULAR ASSIST DEVICE (N/A)    Patient location: ICU    Anesthesia Type: general    Transport from OR: Continuous ECG monitoring in transport. Continuous SpO2 monitoring in transport. Continuos invasive BP monitoring in transport. Transported from OR intubated on 100% O2 by AMBU with adequate controlled ventilation    Post pain: adequate analgesia    Post assessment: no apparent anesthetic complications and tolerated procedure well    Post vital signs: stable    Level of consciousness: awake and alert    Nausea/Vomiting: no nausea/vomiting    Complications: none    Transfer of care protocol was followed      Last vitals:   Visit Vitals  /77 (BP Location: Right arm, Patient Position: Lying, BP Method: Automatic)   Pulse 69   Temp 36.7 °C (98 °F) (Oral)   Resp 19   Ht 5' 8" (1.727 m)   Wt 64.2 kg (141 lb 8.6 oz)   SpO2 100%   BMI 21.52 kg/m²     "

## 2017-07-27 NOTE — PROGRESS NOTES
Arrhythmia clinic  Pt in OR.  MDT BiV/ICD set at DDD 60bpm.   Underlying rhythm SB 40s-50s.  Changed from DDD 60bpm to DOO 60.  ICD tachydetection and therapies disabled.   Anesthesia aware.  Please call [phone] post op to restore ICD.

## 2017-07-27 NOTE — HPI
Reason for Consult: Management of  Hyperglycemia     Surgical Procedure and Date: 7/27/17 s/p LAVD     HPI: 67 y.o. gentleman with PNICM (EF 10%) on home  at 5 mcg/kg/min, esophagitis, HTN, HLD, s/p [employer] CRT-D,SOB (NYHA Class III), 3 pillow orthopnea. He is now s/p LVAD placement and endocrine in consulted for bg management.

## 2017-07-27 NOTE — ANESTHESIA PROCEDURE NOTES
Central Line    Diagnosis: CHF  Patient location during procedure: done in OR  Procedure start time: 7/27/2017 7:31 AM  Timeout: 7/27/2017 7:30 AM  Procedure end time: 7/27/2017 8:05 AM  Staffing  Anesthesiologist: JOHN EMMANUEL  Resident/CRNA: ESTRELLITA BETH  Performed: resident/CRNA   Anesthesiologist was present at the time of the procedure.  Preanesthetic Checklist  Completed: patient identified, site marked, surgical consent, pre-op evaluation, timeout performed, IV checked, risks and benefits discussed, monitors and equipment checked and anesthesia consent given  Indication  Indication: hemodynamic monitoring, vascular access, med administration     Anesthesia   general anesthesia    Central Line  Skin Prep: skin prepped with ChloraPrep, skin prep agent completely dried prior to procedure  maximum sterile barriers used during central venous catheter insertion  hand hygiene performed prior to central venous catheter insertion  Location: right internal jugular,   Catheter type: quad lumen  Catheter Size: 8.5 Fr  Inserted Catheter Length: 16 cm  Ultrasound: vascular probe with ultrasound  Vessel Caliber: medium, patent  Vascular Doppler:  not done, compressibility normal  Needle advanced into vessel with real time Ultrasound guidance.  Guidewire confirmed in vessel.  Sterile sheath used.   Manometry: Venous cannualation confirmed by visual estimation of blood vessel pressure using manometry.  Insertion Attempts: 2   Securement:line sutured, chlorhexidine patch, sterile dressing applied and blood return through all ports     Post-Procedure  X-Ray: successful placement  Adverse Events:none

## 2017-07-27 NOTE — PT/OT/SLP PROGRESS
Occupational Therapy      Suman Hayden  MRN: [id number]    Patient not seen today secondary to LVAD Sx this day. Pt will need new OT and PT orders.  Will follow-up next day     Beatriz Dubose OT  7/27/2017

## 2017-07-27 NOTE — NURSING
Pt transferred to OR with IABP and monitor, accompanied by anesthesia service. Vitals stable. Wife updated about plan of care.

## 2017-07-28 ENCOUNTER — DOCUMENTATION ONLY (OUTPATIENT)
Dept: ELECTROPHYSIOLOGY | Facility: CLINIC | Age: 67
End: 2017-07-28

## 2017-07-28 PROBLEM — I50.9 HEART FAILURE: Status: ACTIVE | Noted: 2017-07-28

## 2017-07-28 PROBLEM — I50.9 HEART FAILURE: Status: ACTIVE | Noted: 2017-07-18

## 2017-07-28 LAB
ALBUMIN SERPL BCP-MCNC: 4.1 G/DL
ALLENS TEST: ABNORMAL
ALP SERPL-CCNC: 32 U/L
ALT SERPL W/O P-5'-P-CCNC: 18 U/L
ANION GAP SERPL CALC-SCNC: 11 MMOL/L
ANION GAP SERPL CALC-SCNC: 11 MMOL/L
ANION GAP SERPL CALC-SCNC: 12 MMOL/L
ANION GAP SERPL CALC-SCNC: 13 MMOL/L
ANION GAP SERPL CALC-SCNC: 13 MMOL/L
ANISOCYTOSIS BLD QL SMEAR: SLIGHT
APTT BLDCRRT: 27.7 SEC
APTT BLDCRRT: 28.6 SEC
APTT BLDCRRT: 28.7 SEC
APTT BLDCRRT: 29 SEC
APTT BLDCRRT: 32.8 SEC
AST SERPL-CCNC: 99 U/L
BASOPHILS # BLD AUTO: 0.01 K/UL
BASOPHILS # BLD AUTO: 0.02 K/UL
BASOPHILS # BLD AUTO: 0.03 K/UL
BASOPHILS NFR BLD: 0.1 %
BASOPHILS NFR BLD: 0.2 %
BASOPHILS NFR BLD: 0.2 %
BASOPHILS NFR BLD: 0.3 %
BASOPHILS NFR BLD: 0.3 %
BILIRUB DIRECT SERPL-MCNC: 1.7 MG/DL
BILIRUB SERPL-MCNC: 2.6 MG/DL
BLD PROD TYP BPU: NORMAL
BLOOD UNIT EXPIRATION DATE: NORMAL
BLOOD UNIT TYPE CODE: 5100
BLOOD UNIT TYPE: NORMAL
BNP SERPL-MCNC: 1184 PG/ML
BUN SERPL-MCNC: 24 MG/DL
BUN SERPL-MCNC: 25 MG/DL
BUN SERPL-MCNC: 25 MG/DL
BUN SERPL-MCNC: 26 MG/DL
BUN SERPL-MCNC: 26 MG/DL
BURR CELLS BLD QL SMEAR: ABNORMAL
CALCIUM SERPL-MCNC: 8.4 MG/DL
CALCIUM SERPL-MCNC: 8.4 MG/DL
CALCIUM SERPL-MCNC: 8.6 MG/DL
CALCIUM SERPL-MCNC: 8.9 MG/DL
CALCIUM SERPL-MCNC: 9 MG/DL
CHLORIDE SERPL-SCNC: 101 MMOL/L
CHLORIDE SERPL-SCNC: 102 MMOL/L
CHLORIDE SERPL-SCNC: 102 MMOL/L
CHLORIDE SERPL-SCNC: 103 MMOL/L
CHLORIDE SERPL-SCNC: 104 MMOL/L
CO2 SERPL-SCNC: 18 MMOL/L
CO2 SERPL-SCNC: 19 MMOL/L
CO2 SERPL-SCNC: 19 MMOL/L
CO2 SERPL-SCNC: 20 MMOL/L
CO2 SERPL-SCNC: 21 MMOL/L
CODING SYSTEM: NORMAL
CREAT SERPL-MCNC: 1.2 MG/DL
CREAT SERPL-MCNC: 1.3 MG/DL
CREAT SERPL-MCNC: 1.3 MG/DL
CRP SERPL-MCNC: 55.5 MG/L
DELSYS: ABNORMAL
DIFFERENTIAL METHOD: ABNORMAL
DISPENSE STATUS: NORMAL
EOSINOPHIL # BLD AUTO: 0.1 K/UL
EOSINOPHIL NFR BLD: 0.4 %
EOSINOPHIL NFR BLD: 0.7 %
EOSINOPHIL NFR BLD: 0.8 %
EOSINOPHIL NFR BLD: 1.2 %
EOSINOPHIL NFR BLD: 1.3 %
ERYTHROCYTE [DISTWIDTH] IN BLOOD BY AUTOMATED COUNT: 14.9 %
ERYTHROCYTE [DISTWIDTH] IN BLOOD BY AUTOMATED COUNT: 15 %
ERYTHROCYTE [DISTWIDTH] IN BLOOD BY AUTOMATED COUNT: 15.2 %
ERYTHROCYTE [DISTWIDTH] IN BLOOD BY AUTOMATED COUNT: 15.3 %
ERYTHROCYTE [DISTWIDTH] IN BLOOD BY AUTOMATED COUNT: 15.3 %
ERYTHROCYTE [SEDIMENTATION RATE] IN BLOOD BY WESTERGREN METHOD: 16 MM/H
EST. GFR  (AFRICAN AMERICAN): >60 ML/MIN/1.73 M^2
EST. GFR  (NON AFRICAN AMERICAN): 56.5 ML/MIN/1.73 M^2
EST. GFR  (NON AFRICAN AMERICAN): 56.5 ML/MIN/1.73 M^2
EST. GFR  (NON AFRICAN AMERICAN): >60 ML/MIN/1.73 M^2
FIO2: 50
FLOW: 60
GLUCOSE SERPL-MCNC: 128 MG/DL
GLUCOSE SERPL-MCNC: 135 MG/DL
GLUCOSE SERPL-MCNC: 143 MG/DL
GLUCOSE SERPL-MCNC: 176 MG/DL
GLUCOSE SERPL-MCNC: 196 MG/DL
HCO3 UR-SCNC: 20.1 MMOL/L (ref 24–28)
HCO3 UR-SCNC: 20.6 MMOL/L (ref 24–28)
HCO3 UR-SCNC: 20.7 MMOL/L (ref 24–28)
HCO3 UR-SCNC: 21.1 MMOL/L (ref 24–28)
HCO3 UR-SCNC: 21.4 MMOL/L (ref 24–28)
HCO3 UR-SCNC: 21.6 MMOL/L (ref 24–28)
HCO3 UR-SCNC: 21.8 MMOL/L (ref 24–28)
HCO3 UR-SCNC: 22.4 MMOL/L (ref 24–28)
HCO3 UR-SCNC: 22.5 MMOL/L (ref 24–28)
HCO3 UR-SCNC: 22.6 MMOL/L (ref 24–28)
HCO3 UR-SCNC: 23.2 MMOL/L (ref 24–28)
HCO3 UR-SCNC: 23.8 MMOL/L (ref 24–28)
HCO3 UR-SCNC: 24.2 MMOL/L (ref 24–28)
HCT VFR BLD AUTO: 24.3 %
HCT VFR BLD AUTO: 24.4 %
HCT VFR BLD AUTO: 24.6 %
HCT VFR BLD AUTO: 26.6 %
HCT VFR BLD AUTO: 26.9 %
HCT VFR BLD CALC: 32 %PCV (ref 36–54)
HGB BLD-MCNC: 8.1 G/DL
HGB BLD-MCNC: 8.2 G/DL
HGB BLD-MCNC: 8.4 G/DL
HGB BLD-MCNC: 9.1 G/DL
HGB BLD-MCNC: 9.1 G/DL
INR PPP: 1.2
INR PPP: 1.2
INR PPP: 1.3
LDH SERPL L TO P-CCNC: 1.27 MMOL/L (ref 0.36–1.25)
LDH SERPL L TO P-CCNC: 1.32 MMOL/L (ref 0.36–1.25)
LDH SERPL L TO P-CCNC: 1.33 MMOL/L (ref 0.36–1.25)
LDH SERPL L TO P-CCNC: 1.42 MMOL/L (ref 0.36–1.25)
LDH SERPL L TO P-CCNC: 1.44 MMOL/L (ref 0.36–1.25)
LDH SERPL L TO P-CCNC: 1.58 MMOL/L (ref 0.36–1.25)
LDH SERPL L TO P-CCNC: 1.64 MMOL/L (ref 0.36–1.25)
LDH SERPL L TO P-CCNC: 1.76 MMOL/L (ref 0.36–1.25)
LDH SERPL L TO P-CCNC: 1.8 MMOL/L (ref 0.36–1.25)
LDH SERPL L TO P-CCNC: 2.24 MMOL/L (ref 0.36–1.25)
LDH SERPL L TO P-CCNC: 2.36 MMOL/L (ref 0.36–1.25)
LDH SERPL L TO P-CCNC: 2.86 MMOL/L (ref 0.36–1.25)
LDH SERPL L TO P-CCNC: 2.98 MMOL/L (ref 0.36–1.25)
LDH SERPL L TO P-CCNC: 388 U/L
LYMPHOCYTES # BLD AUTO: 0.9 K/UL
LYMPHOCYTES # BLD AUTO: 1.1 K/UL
LYMPHOCYTES # BLD AUTO: 1.1 K/UL
LYMPHOCYTES # BLD AUTO: 1.2 K/UL
LYMPHOCYTES # BLD AUTO: 1.4 K/UL
LYMPHOCYTES NFR BLD: 10 %
LYMPHOCYTES NFR BLD: 12 %
LYMPHOCYTES NFR BLD: 15.8 %
LYMPHOCYTES NFR BLD: 6.4 %
LYMPHOCYTES NFR BLD: 7.5 %
MAGNESIUM SERPL-MCNC: 2.3 MG/DL
MAGNESIUM SERPL-MCNC: 2.4 MG/DL
MAGNESIUM SERPL-MCNC: 2.5 MG/DL
MCH RBC QN AUTO: 27.8 PG
MCH RBC QN AUTO: 28.1 PG
MCH RBC QN AUTO: 28.4 PG
MCH RBC QN AUTO: 28.7 PG
MCH RBC QN AUTO: 29 PG
MCHC RBC AUTO-ENTMCNC: 33.3 G/DL
MCHC RBC AUTO-ENTMCNC: 33.6 G/DL
MCHC RBC AUTO-ENTMCNC: 33.8 G/DL
MCHC RBC AUTO-ENTMCNC: 34.1 G/DL
MCHC RBC AUTO-ENTMCNC: 34.2 G/DL
MCV RBC AUTO: 84 FL
MCV RBC AUTO: 85 FL
MODE: ABNORMAL
MONOCYTES # BLD AUTO: 0.8 K/UL
MONOCYTES # BLD AUTO: 0.9 K/UL
MONOCYTES # BLD AUTO: 1.1 K/UL
MONOCYTES # BLD AUTO: 1.3 K/UL
MONOCYTES # BLD AUTO: 1.4 K/UL
MONOCYTES NFR BLD: 10.2 %
MONOCYTES NFR BLD: 11.4 %
MONOCYTES NFR BLD: 8.3 %
MONOCYTES NFR BLD: 8.9 %
MONOCYTES NFR BLD: 9.1 %
NEUTROPHILS # BLD AUTO: 11.3 K/UL
NEUTROPHILS # BLD AUTO: 11.5 K/UL
NEUTROPHILS # BLD AUTO: 6.6 K/UL
NEUTROPHILS # BLD AUTO: 7.3 K/UL
NEUTROPHILS # BLD AUTO: 8.6 K/UL
NEUTROPHILS NFR BLD: 73.9 %
NEUTROPHILS NFR BLD: 74.9 %
NEUTROPHILS NFR BLD: 79.6 %
NEUTROPHILS NFR BLD: 81.4 %
NEUTROPHILS NFR BLD: 84 %
NUM UNITS TRANS FFP: NORMAL
PCO2 BLDA: 31.8 MMHG (ref 35–45)
PCO2 BLDA: 31.9 MMHG (ref 35–45)
PCO2 BLDA: 32.4 MMHG (ref 35–45)
PCO2 BLDA: 32.5 MMHG (ref 35–45)
PCO2 BLDA: 33.1 MMHG (ref 35–45)
PCO2 BLDA: 33.2 MMHG (ref 35–45)
PCO2 BLDA: 33.3 MMHG (ref 35–45)
PCO2 BLDA: 33.4 MMHG (ref 35–45)
PCO2 BLDA: 33.6 MMHG (ref 35–45)
PCO2 BLDA: 33.9 MMHG (ref 35–45)
PCO2 BLDA: 34.4 MMHG (ref 35–45)
PCO2 BLDA: 35.1 MMHG (ref 35–45)
PCO2 BLDA: 35.6 MMHG (ref 35–45)
PCO2 BLDA: 35.6 MMHG (ref 35–45)
PCO2 BLDA: 41.6 MMHG (ref 35–45)
PEEP: 5
PH SMN: 7.32 [PH] (ref 7.35–7.45)
PH SMN: 7.38 [PH] (ref 7.35–7.45)
PH SMN: 7.4 [PH] (ref 7.35–7.45)
PH SMN: 7.4 [PH] (ref 7.35–7.45)
PH SMN: 7.41 [PH] (ref 7.35–7.45)
PH SMN: 7.41 [PH] (ref 7.35–7.45)
PH SMN: 7.43 [PH] (ref 7.35–7.45)
PH SMN: 7.44 [PH] (ref 7.35–7.45)
PH SMN: 7.45 [PH] (ref 7.35–7.45)
PH SMN: 7.45 [PH] (ref 7.35–7.45)
PH SMN: 7.46 [PH] (ref 7.35–7.45)
PH SMN: 7.46 [PH] (ref 7.35–7.45)
PH SMN: 7.47 [PH] (ref 7.35–7.45)
PHOSPHATE SERPL-MCNC: 2.5 MG/DL
PHOSPHATE SERPL-MCNC: 2.5 MG/DL
PHOSPHATE SERPL-MCNC: 2.6 MG/DL
PHOSPHATE SERPL-MCNC: 2.9 MG/DL
PHOSPHATE SERPL-MCNC: 2.9 MG/DL
PIP: 24
PIP: 26
PIP: 27
PIP: 28
PIP: 28
PLATELET # BLD AUTO: 102 K/UL
PLATELET # BLD AUTO: 73 K/UL
PLATELET # BLD AUTO: 79 K/UL
PLATELET # BLD AUTO: 83 K/UL
PLATELET # BLD AUTO: 86 K/UL
PLATELET BLD QL SMEAR: ABNORMAL
PMV BLD AUTO: 10.9 FL
PMV BLD AUTO: 11.1 FL
PMV BLD AUTO: 11.6 FL
PMV BLD AUTO: 11.7 FL
PMV BLD AUTO: 11.8 FL
PO2 BLDA: 210 MMHG (ref 80–100)
PO2 BLDA: 226 MMHG (ref 80–100)
PO2 BLDA: 228 MMHG (ref 80–100)
PO2 BLDA: 233 MMHG (ref 80–100)
PO2 BLDA: 240 MMHG (ref 80–100)
PO2 BLDA: 241 MMHG (ref 80–100)
PO2 BLDA: 242 MMHG (ref 80–100)
PO2 BLDA: 244 MMHG (ref 80–100)
PO2 BLDA: 246 MMHG (ref 80–100)
PO2 BLDA: 248 MMHG (ref 80–100)
PO2 BLDA: 248 MMHG (ref 80–100)
PO2 BLDA: 253 MMHG (ref 80–100)
PO2 BLDA: 255 MMHG (ref 80–100)
PO2 BLDA: 34 MMHG (ref 40–60)
PO2 BLDA: 34 MMHG (ref 40–60)
POC BE: -1 MMOL/L
POC BE: -2 MMOL/L
POC BE: -3 MMOL/L
POC BE: -4 MMOL/L
POC BE: -4 MMOL/L
POC BE: -5 MMOL/L
POC BE: 0 MMOL/L
POC IONIZED CALCIUM: 1.15 MMOL/L (ref 1.06–1.42)
POC SATURATED O2: 100 % (ref 95–100)
POC SATURATED O2: 60 % (ref 95–100)
POC SATURATED O2: 68 % (ref 95–100)
POC TCO2: 21 MMOL/L (ref 23–27)
POC TCO2: 22 MMOL/L (ref 23–27)
POC TCO2: 23 MMOL/L (ref 23–27)
POC TCO2: 23 MMOL/L (ref 24–29)
POC TCO2: 24 MMOL/L (ref 23–27)
POC TCO2: 24 MMOL/L (ref 23–27)
POC TCO2: 25 MMOL/L (ref 23–27)
POC TCO2: 25 MMOL/L (ref 24–29)
POCT GLUCOSE: 120 MG/DL (ref 70–110)
POCT GLUCOSE: 125 MG/DL (ref 70–110)
POCT GLUCOSE: 133 MG/DL (ref 70–110)
POCT GLUCOSE: 137 MG/DL (ref 70–110)
POCT GLUCOSE: 139 MG/DL (ref 70–110)
POCT GLUCOSE: 142 MG/DL (ref 70–110)
POCT GLUCOSE: 151 MG/DL (ref 70–110)
POCT GLUCOSE: 156 MG/DL (ref 70–110)
POCT GLUCOSE: 158 MG/DL (ref 70–110)
POCT GLUCOSE: 159 MG/DL (ref 70–110)
POCT GLUCOSE: 162 MG/DL (ref 70–110)
POCT GLUCOSE: 165 MG/DL (ref 70–110)
POCT GLUCOSE: 171 MG/DL (ref 70–110)
POCT GLUCOSE: 175 MG/DL (ref 70–110)
POCT GLUCOSE: 200 MG/DL (ref 70–110)
POCT GLUCOSE: 209 MG/DL (ref 70–110)
POIKILOCYTOSIS BLD QL SMEAR: SLIGHT
POTASSIUM BLD-SCNC: 3.9 MMOL/L (ref 3.5–5.1)
POTASSIUM SERPL-SCNC: 3.8 MMOL/L
POTASSIUM SERPL-SCNC: 4 MMOL/L
POTASSIUM SERPL-SCNC: 4 MMOL/L
POTASSIUM SERPL-SCNC: 4.3 MMOL/L
POTASSIUM SERPL-SCNC: 4.5 MMOL/L
PREALB SERPL-MCNC: 13 MG/DL
PROT SERPL-MCNC: 6.3 G/DL
PROTHROMBIN TIME: 12.3 SEC
PROTHROMBIN TIME: 12.8 SEC
PROTHROMBIN TIME: 13 SEC
PROTHROMBIN TIME: 13 SEC
PROTHROMBIN TIME: 13.1 SEC
PS: 10
RBC # BLD AUTO: 2.9 M/UL
RBC # BLD AUTO: 2.91 M/UL
RBC # BLD AUTO: 2.92 M/UL
RBC # BLD AUTO: 3.17 M/UL
RBC # BLD AUTO: 3.2 M/UL
SAMPLE: ABNORMAL
SITE: ABNORMAL
SODIUM BLD-SCNC: 134 MMOL/L (ref 136–145)
SODIUM SERPL-SCNC: 133 MMOL/L
SODIUM SERPL-SCNC: 134 MMOL/L
SODIUM SERPL-SCNC: 136 MMOL/L
SP02: 100
SP02: 88
SP02: 90
SP02: 91
SP02: 92
SP02: 95
SP02: 95
TRANS ERYTHROCYTES VOL PATIENT: NORMAL ML
VT: 500
WBC # BLD AUTO: 10.78 K/UL
WBC # BLD AUTO: 13.73 K/UL
WBC # BLD AUTO: 13.92 K/UL
WBC # BLD AUTO: 8.88 K/UL
WBC # BLD AUTO: 9.68 K/UL

## 2017-07-28 PROCEDURE — 93284 PRGRMG EVAL IMPLANTABLE DFB: CPT

## 2017-07-28 PROCEDURE — 80076 HEPATIC FUNCTION PANEL: CPT

## 2017-07-28 PROCEDURE — 85025 COMPLETE CBC W/AUTO DIFF WBC: CPT | Mod: 91

## 2017-07-28 PROCEDURE — 25000003 PHARM REV CODE 250: Performed by: STUDENT IN AN ORGANIZED HEALTH CARE EDUCATION/TRAINING PROGRAM

## 2017-07-28 PROCEDURE — P9045 ALBUMIN (HUMAN), 5%, 250 ML: HCPCS | Performed by: STUDENT IN AN ORGANIZED HEALTH CARE EDUCATION/TRAINING PROGRAM

## 2017-07-28 PROCEDURE — 93312 ECHO TRANSESOPHAGEAL: CPT | Mod: 59,,, | Performed by: ANESTHESIOLOGY

## 2017-07-28 PROCEDURE — 21750 REPAIR OF STERNUM SEPARATION: CPT | Mod: ,,, | Performed by: THORACIC SURGERY (CARDIOTHORACIC VASCULAR SURGERY)

## 2017-07-28 PROCEDURE — 85610 PROTHROMBIN TIME: CPT | Mod: 91

## 2017-07-28 PROCEDURE — 85730 THROMBOPLASTIN TIME PARTIAL: CPT | Mod: 91

## 2017-07-28 PROCEDURE — 80048 BASIC METABOLIC PNL TOTAL CA: CPT | Mod: 91

## 2017-07-28 PROCEDURE — 93287 PERI-PX DEVICE EVAL & PRGR: CPT

## 2017-07-28 PROCEDURE — 02UN0JZ SUPPLEMENT PERICARDIUM WITH SYNTHETIC SUBSTITUTE, OPEN APPROACH: ICD-10-PCS | Performed by: THORACIC SURGERY (CARDIOTHORACIC VASCULAR SURGERY)

## 2017-07-28 PROCEDURE — 99900026 HC AIRWAY MAINTENANCE (STAT)

## 2017-07-28 PROCEDURE — 94003 VENT MGMT INPAT SUBQ DAY: CPT

## 2017-07-28 PROCEDURE — 63600175 PHARM REV CODE 636 W HCPCS: Performed by: THORACIC SURGERY (CARDIOTHORACIC VASCULAR SURGERY)

## 2017-07-28 PROCEDURE — 97802 MEDICAL NUTRITION INDIV IN: CPT

## 2017-07-28 PROCEDURE — 83735 ASSAY OF MAGNESIUM: CPT | Mod: 91

## 2017-07-28 PROCEDURE — 93503 INSERT/PLACE HEART CATHETER: CPT | Mod: 59,,, | Performed by: ANESTHESIOLOGY

## 2017-07-28 PROCEDURE — 80048 BASIC METABOLIC PNL TOTAL CA: CPT

## 2017-07-28 PROCEDURE — 94760 N-INVAS EAR/PLS OXIMETRY 1: CPT

## 2017-07-28 PROCEDURE — 27201423 OPTIME MED/SURG SUP & DEVICES STERILE SUPPLY: Performed by: THORACIC SURGERY (CARDIOTHORACIC VASCULAR SURGERY)

## 2017-07-28 PROCEDURE — 27800903 OPTIME MED/SURG SUP & DEVICES OTHER IMPLANTS: Performed by: THORACIC SURGERY (CARDIOTHORACIC VASCULAR SURGERY)

## 2017-07-28 PROCEDURE — 99233 SBSQ HOSP IP/OBS HIGH 50: CPT | Mod: ,,, | Performed by: INTERNAL MEDICINE

## 2017-07-28 PROCEDURE — P9021 RED BLOOD CELLS UNIT: HCPCS

## 2017-07-28 PROCEDURE — A4216 STERILE WATER/SALINE, 10 ML: HCPCS | Performed by: STUDENT IN AN ORGANIZED HEALTH CARE EDUCATION/TRAINING PROGRAM

## 2017-07-28 PROCEDURE — 99223 1ST HOSP IP/OBS HIGH 75: CPT | Mod: ,,, | Performed by: ANESTHESIOLOGY

## 2017-07-28 PROCEDURE — 36000712 HC OR TIME LEV V 1ST 15 MIN: Performed by: THORACIC SURGERY (CARDIOTHORACIC VASCULAR SURGERY)

## 2017-07-28 PROCEDURE — 3E1Y38Z IRRIGATION OF PERICARDIAL CAVITY USING IRRIGATING SUBSTANCE, PERCUTANEOUS APPROACH: ICD-10-PCS | Performed by: THORACIC SURGERY (CARDIOTHORACIC VASCULAR SURGERY)

## 2017-07-28 PROCEDURE — 27000239 HC STAND-BY BYPASS PUMP

## 2017-07-28 PROCEDURE — 37000008 HC ANESTHESIA 1ST 15 MINUTES: Performed by: THORACIC SURGERY (CARDIOTHORACIC VASCULAR SURGERY)

## 2017-07-28 PROCEDURE — 36415 COLL VENOUS BLD VENIPUNCTURE: CPT

## 2017-07-28 PROCEDURE — 85730 THROMBOPLASTIN TIME PARTIAL: CPT

## 2017-07-28 PROCEDURE — 0PQ00ZZ REPAIR STERNUM, OPEN APPROACH: ICD-10-PCS | Performed by: THORACIC SURGERY (CARDIOTHORACIC VASCULAR SURGERY)

## 2017-07-28 PROCEDURE — 99233 SBSQ HOSP IP/OBS HIGH 50: CPT | Mod: 24,,, | Performed by: THORACIC SURGERY (CARDIOTHORACIC VASCULAR SURGERY)

## 2017-07-28 PROCEDURE — 63600175 PHARM REV CODE 636 W HCPCS: Performed by: STUDENT IN AN ORGANIZED HEALTH CARE EDUCATION/TRAINING PROGRAM

## 2017-07-28 PROCEDURE — 25000003 PHARM REV CODE 250: Performed by: THORACIC SURGERY (CARDIOTHORACIC VASCULAR SURGERY)

## 2017-07-28 PROCEDURE — 99233 SBSQ HOSP IP/OBS HIGH 50: CPT | Mod: ,,, | Performed by: NURSE PRACTITIONER

## 2017-07-28 PROCEDURE — 27000248 HC VAD-ADDITIONAL DAY

## 2017-07-28 PROCEDURE — 36000713 HC OR TIME LEV V EA ADD 15 MIN: Performed by: THORACIC SURGERY (CARDIOTHORACIC VASCULAR SURGERY)

## 2017-07-28 PROCEDURE — 84100 ASSAY OF PHOSPHORUS: CPT | Mod: 91

## 2017-07-28 PROCEDURE — C1729 CATH, DRAINAGE: HCPCS | Performed by: THORACIC SURGERY (CARDIOTHORACIC VASCULAR SURGERY)

## 2017-07-28 PROCEDURE — 37000009 HC ANESTHESIA EA ADD 15 MINS: Performed by: THORACIC SURGERY (CARDIOTHORACIC VASCULAR SURGERY)

## 2017-07-28 PROCEDURE — 84295 ASSAY OF SERUM SODIUM: CPT

## 2017-07-28 PROCEDURE — 82803 BLOOD GASES ANY COMBINATION: CPT

## 2017-07-28 PROCEDURE — 20000000 HC ICU ROOM

## 2017-07-28 PROCEDURE — D9220A PRA ANESTHESIA: Mod: ,,, | Performed by: ANESTHESIOLOGY

## 2017-07-28 PROCEDURE — 83615 LACTATE (LD) (LDH) ENZYME: CPT

## 2017-07-28 PROCEDURE — 63600367 HC NITRIC OXIDE PER HOUR

## 2017-07-28 PROCEDURE — 83605 ASSAY OF LACTIC ACID: CPT

## 2017-07-28 PROCEDURE — 82330 ASSAY OF CALCIUM: CPT

## 2017-07-28 PROCEDURE — 25000003 PHARM REV CODE 250: Performed by: ANESTHESIOLOGY

## 2017-07-28 PROCEDURE — 83880 ASSAY OF NATRIURETIC PEPTIDE: CPT

## 2017-07-28 PROCEDURE — 84134 ASSAY OF PREALBUMIN: CPT

## 2017-07-28 PROCEDURE — 84132 ASSAY OF SERUM POTASSIUM: CPT

## 2017-07-28 PROCEDURE — 93287 PERI-PX DEVICE EVAL & PRGR: CPT | Mod: 26,,, | Performed by: INTERNAL MEDICINE

## 2017-07-28 PROCEDURE — 85014 HEMATOCRIT: CPT

## 2017-07-28 PROCEDURE — 37799 UNLISTED PX VASCULAR SURGERY: CPT

## 2017-07-28 PROCEDURE — 93750 INTERROGATION VAD IN PERSON: CPT | Mod: ,,, | Performed by: THORACIC SURGERY (CARDIOTHORACIC VASCULAR SURGERY)

## 2017-07-28 PROCEDURE — 86140 C-REACTIVE PROTEIN: CPT

## 2017-07-28 PROCEDURE — 63600175 PHARM REV CODE 636 W HCPCS: Performed by: ANESTHESIOLOGY

## 2017-07-28 DEVICE — MEMBRANE PERICARDIAL 15X20CM: Type: IMPLANTABLE DEVICE | Site: HEART | Status: FUNCTIONAL

## 2017-07-28 RX ORDER — HYDROCODONE BITARTRATE AND ACETAMINOPHEN 500; 5 MG/1; MG/1
TABLET ORAL
Status: DISCONTINUED | OUTPATIENT
Start: 2017-07-28 | End: 2017-07-31

## 2017-07-28 RX ORDER — HEPARIN SODIUM 10000 [USP'U]/100ML
1100 INJECTION, SOLUTION INTRAVENOUS CONTINUOUS
Status: DISCONTINUED | OUTPATIENT
Start: 2017-07-29 | End: 2017-08-02

## 2017-07-28 RX ORDER — ROCURONIUM BROMIDE 10 MG/ML
INJECTION, SOLUTION INTRAVENOUS
Status: DISCONTINUED | OUTPATIENT
Start: 2017-07-28 | End: 2017-07-28

## 2017-07-28 RX ORDER — FUROSEMIDE 10 MG/ML
10 INJECTION INTRAMUSCULAR; INTRAVENOUS ONCE
Status: COMPLETED | OUTPATIENT
Start: 2017-07-28 | End: 2017-07-28

## 2017-07-28 RX ORDER — FENTANYL CITRATE 50 UG/ML
50 INJECTION, SOLUTION INTRAMUSCULAR; INTRAVENOUS EVERY 4 HOURS PRN
Status: DISCONTINUED | OUTPATIENT
Start: 2017-07-28 | End: 2017-08-18

## 2017-07-28 RX ORDER — DEXTROSE MONOHYDRATE AND SODIUM CHLORIDE 5; .9 G/100ML; G/100ML
INJECTION, SOLUTION INTRAVENOUS CONTINUOUS
Status: DISCONTINUED | OUTPATIENT
Start: 2017-07-28 | End: 2017-08-04

## 2017-07-28 RX ORDER — BACITRACIN 50000 [IU]/1
INJECTION, POWDER, FOR SOLUTION INTRAMUSCULAR
Status: DISCONTINUED | OUTPATIENT
Start: 2017-07-28 | End: 2017-07-28 | Stop reason: HOSPADM

## 2017-07-28 RX ORDER — MIDAZOLAM HYDROCHLORIDE 1 MG/ML
INJECTION, SOLUTION INTRAMUSCULAR; INTRAVENOUS
Status: DISCONTINUED | OUTPATIENT
Start: 2017-07-28 | End: 2017-07-28

## 2017-07-28 RX ORDER — ALBUMIN HUMAN 50 G/1000ML
25 SOLUTION INTRAVENOUS ONCE
Status: COMPLETED | OUTPATIENT
Start: 2017-07-28 | End: 2017-07-28

## 2017-07-28 RX ADMIN — CEFEPIME HYDROCHLORIDE 1 G: 1 INJECTION, SOLUTION INTRAVENOUS at 05:07

## 2017-07-28 RX ADMIN — FUROSEMIDE 20 MG/HR: 10 INJECTION, SOLUTION INTRAMUSCULAR; INTRAVENOUS at 07:07

## 2017-07-28 RX ADMIN — ALBUMIN (HUMAN) 500 ML: 12.5 SOLUTION INTRAVENOUS at 05:07

## 2017-07-28 RX ADMIN — ASPIRIN 325 MG ORAL TABLET 325 MG: 325 PILL ORAL at 11:07

## 2017-07-28 RX ADMIN — Medication 3 ML: at 05:07

## 2017-07-28 RX ADMIN — MUPIROCIN: 20 OINTMENT TOPICAL at 11:07

## 2017-07-28 RX ADMIN — AMIODARONE HYDROCHLORIDE 1 MG/MIN: 1.8 INJECTION, SOLUTION INTRAVENOUS at 05:07

## 2017-07-28 RX ADMIN — ALBUMIN (HUMAN) 25 G: 12.5 SOLUTION INTRAVENOUS at 05:07

## 2017-07-28 RX ADMIN — AMIODARONE HYDROCHLORIDE 150 MG: 1.5 INJECTION, SOLUTION INTRAVENOUS at 05:07

## 2017-07-28 RX ADMIN — NICARDIPINE HYDROCHLORIDE 5 MG/HR: 0.2 INJECTION, SOLUTION INTRAVENOUS at 11:07

## 2017-07-28 RX ADMIN — PROPOFOL 40 MCG/KG/MIN: 10 INJECTION, EMULSION INTRAVENOUS at 06:07

## 2017-07-28 RX ADMIN — Medication 3 ML: at 10:07

## 2017-07-28 RX ADMIN — MUPIROCIN: 20 OINTMENT TOPICAL at 09:07

## 2017-07-28 RX ADMIN — ROCURONIUM BROMIDE 50 MG: 10 INJECTION, SOLUTION INTRAVENOUS at 07:07

## 2017-07-28 RX ADMIN — EPINEPHRINE 0.07 MCG/KG/MIN: 1 INJECTION PARENTERAL at 09:07

## 2017-07-28 RX ADMIN — MIDAZOLAM HYDROCHLORIDE 4 MG: 1 INJECTION, SOLUTION INTRAMUSCULAR; INTRAVENOUS at 07:07

## 2017-07-28 RX ADMIN — PRAVASTATIN SODIUM 20 MG: 20 TABLET ORAL at 09:07

## 2017-07-28 RX ADMIN — PROPOFOL 30 MCG/KG/MIN: 10 INJECTION, EMULSION INTRAVENOUS at 03:07

## 2017-07-28 RX ADMIN — FUROSEMIDE 2.5 MG/HR: 10 INJECTION, SOLUTION INTRAMUSCULAR; INTRAVENOUS at 11:07

## 2017-07-28 RX ADMIN — FENTANYL CITRATE 25 MCG: 50 INJECTION INTRAMUSCULAR; INTRAVENOUS at 01:07

## 2017-07-28 RX ADMIN — EPINEPHRINE 0.08 MCG/KG/MIN: 1 INJECTION PARENTERAL at 02:07

## 2017-07-28 RX ADMIN — FENTANYL CITRATE 25 MCG: 50 INJECTION INTRAMUSCULAR; INTRAVENOUS at 02:07

## 2017-07-28 RX ADMIN — CEFEPIME HYDROCHLORIDE 1 G: 1 INJECTION, SOLUTION INTRAVENOUS at 03:07

## 2017-07-28 RX ADMIN — VANCOMYCIN HYDROCHLORIDE 750 MG: 1 INJECTION, POWDER, LYOPHILIZED, FOR SOLUTION INTRAVENOUS at 05:07

## 2017-07-28 RX ADMIN — POTASSIUM CHLORIDE 40 MEQ: 200 INJECTION, SOLUTION INTRAVENOUS at 05:07

## 2017-07-28 RX ADMIN — SODIUM PHOSPHATE, MONOBASIC, MONOHYDRATE 15 MMOL: 276; 142 INJECTION, SOLUTION INTRAVENOUS at 03:07

## 2017-07-28 RX ADMIN — DEXTROSE AND SODIUM CHLORIDE: 5; .9 INJECTION, SOLUTION INTRAVENOUS at 07:07

## 2017-07-28 RX ADMIN — PROPOFOL 30 MCG/KG/MIN: 10 INJECTION, EMULSION INTRAVENOUS at 04:07

## 2017-07-28 RX ADMIN — CEFEPIME HYDROCHLORIDE 1 G: 1 INJECTION, SOLUTION INTRAVENOUS at 08:07

## 2017-07-28 RX ADMIN — FLUCONAZOLE IN SODIUM CHLORIDE 400 MG: 2 INJECTION, SOLUTION INTRAVENOUS at 08:07

## 2017-07-28 RX ADMIN — FENTANYL CITRATE 100 MCG: 50 INJECTION INTRAMUSCULAR; INTRAVENOUS at 09:07

## 2017-07-28 RX ADMIN — VANCOMYCIN HYDROCHLORIDE 750 MG: 1 INJECTION, POWDER, LYOPHILIZED, FOR SOLUTION INTRAVENOUS at 08:07

## 2017-07-28 RX ADMIN — FENTANYL CITRATE 150 MCG: 50 INJECTION INTRAMUSCULAR; INTRAVENOUS at 08:07

## 2017-07-28 RX ADMIN — AMIODARONE HYDROCHLORIDE 1 MG/MIN: 1.8 INJECTION, SOLUTION INTRAVENOUS at 07:07

## 2017-07-28 RX ADMIN — PROPOFOL 30 MCG/KG/MIN: 10 INJECTION, EMULSION INTRAVENOUS at 02:07

## 2017-07-28 RX ADMIN — EPINEPHRINE 0.07 MCG/KG/MIN: 1 INJECTION PARENTERAL at 11:07

## 2017-07-28 RX ADMIN — FUROSEMIDE 10 MG: 10 INJECTION, SOLUTION INTRAMUSCULAR; INTRAVENOUS at 11:07

## 2017-07-28 RX ADMIN — AMIODARONE HYDROCHLORIDE 0.5 MG/MIN: 1.8 INJECTION, SOLUTION INTRAVENOUS at 11:07

## 2017-07-28 RX ADMIN — FENTANYL CITRATE 25 MCG: 50 INJECTION INTRAMUSCULAR; INTRAVENOUS at 07:07

## 2017-07-28 RX ADMIN — DOCUSATE SODIUM 200 MG: 100 CAPSULE, LIQUID FILLED ORAL at 09:07

## 2017-07-28 RX ADMIN — PROPOFOL 20 MCG/KG/MIN: 10 INJECTION, EMULSION INTRAVENOUS at 07:07

## 2017-07-28 NOTE — PROGRESS NOTES
Update Note:    SW to pt's room for update. Pt intubated and sedated with no family present. SW to return as appropriate. SW providing ongoing psychosocial counseling and support, education, assistance, resources, and discharge planning as indicated. SW continuing to follow and remains available.

## 2017-07-28 NOTE — CONSULTS
"  [facility] [address]  Adult Nutrition  Consult Note    SUMMARY     Recommendations    1. Once extubated, advance PO diet order to Cardiac as tolerated.   2. Add 2 packets Beneprotein to each tray to meet increased protein needs.   3. If PO intake poor >48 hrs, add Boost Plus TID to supplement intake.   4. If pt remains intubated >72 hrs, begin enteral feeds with Impact Peptide 1.5.    Goals: Meet % EEN, EPN  Nutrition Goal Status: new  Communication of RD Recs: reviewed with RN    Reason for Assessment    Reason for Assessment: physician consult  Diagnosis: other (see comments) (HF)      Interdisciplinary Rounds: attended     General Information Comments: Pt intubated, sedated. S/p LVAD 7/27, plan for LVAD closure this AM.  Nutrition Discharge Planning: Adequate nutrition    Nutrition Prescription Ordered    Current Diet Order: NPO    Evaluation of Received Nutrients/Fluid Intake    Other Calories (kcal):  (317 calories (Propofol @ 12 mL/hr)     IV Fluid (mL): 240    Nutrition Risk Screen     Nutrition Risk Screen: no indicators present    Nutrition/Diet History    Patient Reported Diet/Restrictions/Preferences: general, low salt     Factors Affecting Nutritional Intake: NPO, on mechanical ventilation    Labs/Tests/Procedures/Meds    Pertinent Labs Reviewed: reviewed, pertinent  Pertinent Labs Comments: Na 133, BUN 26, Gluc 176, Bili 2.6  Pertinent Medications Reviewed: reviewed, pertinent  Pertinent Medications Comments: D5 @ 10 mL/hr, Adrenalin, Insulin, Dobutamine, Nicardipine, Propofol, Vasopressin    Physical Findings    Overall Physical Appearance: nourished, on ventilator support  Tubes: orogastric tube  Skin: incision, other (see comments) (in chest)    Anthropometrics    Height: 5' 8" (172.7 cm)  Weight Method: Bed Scale  Weight: 79.7 kg (175 lb 11.3 oz)    Ideal Body Weight (IBW), Male: 154 lb  % Ideal Body Weight, Male (lb): 114.1 lb     BMI (Calculated): 26.8  BMI Grade: 25 - 29.9 - " overweight    Estimated/Assessed Needs    Weight Used For Calorie Calculations: 79.7 kg (175 lb 11.3 oz)      Energy Need Method: Wilson-St Jeor, other (see comments) (1933 kcal/d)     Weight Used For Protein Calculations: 79.7 kg (175 lb 11.3 oz)  Protein Requirements: 104-128 g/d     Fluid Need Method: RDA Method, other (see comments) (Per MD or 1 mL/kcal)    Assessment and Plan    Increased nutrient needs related to physiological causes as evidenced by s/p LVAD placement.  Status: New    Monitor and Evaluation    Food and Nutrient Intake: energy intake, food and beverage intake, enteral nutrition intake  Food and Nutrient Adminstration: diet order, enteral and parenteral nutrition administration     Physical Activity and Function: nutrition-related ADLs and IADLs  Anthropometric Measurements: weight, weight change, body mass index  Biochemical Data, Medical Tests and Procedures: gastrointestinal profile, electrolyte and renal panel, glucose/endocrine profile, lipid profile, inflammatory profile  Nutrition-Focused Physical Findings: overall appearance    Nutrition Risk    Level of Risk: other (see comments) (2x/week)    Nutrition Follow-Up    RD Follow-up?: Yes

## 2017-07-28 NOTE — PROGRESS NOTES
Called to OR #9 to turn off tachy therapy on pt having chest closure.  Tachy therapy disabled and reprogrammed from DDD to DOO mode. Printouts with changes made placed in pt chart.  Call device clinic at [phone] after surgery for device reprogramming.

## 2017-07-28 NOTE — PT/OT/SLP PROGRESS
Occupational Therapy      Suman Hayden  MRN: [id number]    Patient not seen today secondary to off floor for chest closure. Will follow-up next day     Beatriz Dubose OT  7/28/2017

## 2017-07-28 NOTE — PROGRESS NOTES
Pt came up from the OR from getting his chest closed s/p LVAD placement. Pt is intubated with a 7.5 ETT secured 26cm at the lip. Pt was placed back on the vent with Nitric running inline at the documented settings. Will continue to monitor.

## 2017-07-28 NOTE — ASSESSMENT & PLAN NOTE
bg goal 140-180,   Continue intensive insulin iv protocol, change bg monitoring to q2h    Discharge planning: [place]

## 2017-07-28 NOTE — ANESTHESIA PROCEDURE NOTES
Final GLENN    Diagnosis: heart failure   Patient location during procedure: OR  Procedure start time: 7/28/2017 9:13 AM  Timeout: 7/28/2017 9:12 AM  Procedure end time: 7/28/2017 9:29 AM  Exam type: Final  Staffing  Anesthesiologist: RAEGAN HUSSEIN  Performed: anesthesiologist   Preanesthetic Checklist  Completed: patient identified, surgical consent, pre-op evaluation, timeout performed, risks and benefits discussed, monitors and equipment checked, anesthesia consent given, oxygen available, suction available, hand hygiene performed and patient being monitored  Setup & Induction  Exam: incomplete    Exam  Estimated Ejection Fraction: < 25% severe        Right Heart  Right Ventricle dilated: yes  Right Ventricle Function: mild      Aortic Valve:  Prosthesis: none  Perivalvular leak: no  Regurgitation(color flow): 0-tr     Mitral Valve:  Prosthesis: none  Perivalvlular Leak: no  Regurgitation(color flow): 1+     Tricuspid Valve:  Prosthesis: none  Pervalvular Leak: no  Regurgitation: 2+    Pulmonic Valve:  Prosthesis: none  Regurgitation(color flow): 0-tr  Perivalvular Leak: no      Aorta  Descending Aorta dissection: no  Descending aorta IABP: no  Aortic Arch Dissection: no  Ascending Aorta Dissection: no    LVAD:yes  Inflow Cannula and Direction: midline  Outflow Cannula:not seen        Effusions    Summary    Other Findings  No change in findings after chest closure.  RV mildly depressed.  No change in valvular abnormalities

## 2017-07-28 NOTE — PROGRESS NOTES
Arrhythmia clinic  Order received to assess ICD for arrhythmias.  Interrogation done at bedside.   Although pt appears to be AP/BiV paced, pt is in AF with undersensing. Currently at DDD at 80 bpm. Dr. Willett called to assess findings.  Unable to sense P waves and R waves are now 0.8mV. Prior to LVAD   P waves were 0.8mV and R waves were 2.8-3.10mV  Impedance wnl on all leads.  Able to pace RV lead in higher output.  RV sensing inappropriate.  ICD tachy detection and therapies turned to OFF.  Set to VVI 80bpm.  Adjusted RV output and pulse width.  Informed Paul GASTELUM that defibrillator is OFF and to apply external pads.  RV lead and possibly RA lead will need to be revised.

## 2017-07-28 NOTE — SUBJECTIVE & OBJECTIVE
"Interval HPI:   Pt is currently off pressors.  Remains intubated.  On intensive insulin iv protocol.  No hypoglycemia.  BG at or near goal.    BP (!) 88/0   Pulse 103   Temp 99.3 °F (37.4 °C) (Core ([place]))   Resp 18   Ht 5' 8" (1.727 m)   Wt 79.7 kg (175 lb 11.3 oz)   SpO2 100%   BMI 26.72 kg/m²     Labs Reviewed and Include      Recent Labs  Lab 07/28/17  0357  07/28/17  1513   *  < > 128*   CALCIUM 9.0  < > 8.6*   ALBUMIN 4.1  --   --    PROT 6.3  --   --    *  < > 133*   K 3.8  < > 4.5   CO2 21*  < > 19*     < > 103   BUN 26*  < > 25*   CREATININE 1.3  < > 1.2   ALKPHOS 32*  --   --    ALT 18  --   --    AST 99*  --   --    BILITOT 2.6*  --   --    < > = values in this interval not displayed.  Lab Results   Component Value Date    WBC 10.78 07/28/2017    HGB 9.1 (L) 07/28/2017    HCT 26.9 (L) 07/28/2017    MCV 84 07/28/2017     (L) 07/28/2017     No results for input(s): TSH, FREET4 in the last 168 hours.  Lab Results   Component Value Date    HGBA1C 5.4 07/06/2017       Nutritional status:   Body mass index is 26.72 kg/m².  Lab Results   Component Value Date    ALBUMIN 4.1 07/28/2017    ALBUMIN 3.0 (L) 07/27/2017    ALBUMIN 3.0 (L) 07/27/2017     Lab Results   Component Value Date    PREALBUMIN 13 (L) 07/28/2017    PREALBUMIN 18 (L) 07/06/2017       Estimated Creatinine Clearance: 57.8 mL/min (based on Cr of 1.2).    Accu-Checks  Recent Labs      07/28/17   0005  07/28/17   0102  07/28/17   0201  07/28/17   0300  07/28/17   0355  07/28/17   0456  07/28/17   0624  07/28/17   1047  07/28/17   1156  07/28/17   1404   POCTGLUCOSE  133*  139*  151*  137*  175*  162*  209*  200*  165*  120*       Current Medications and/or Treatments Impacting Glycemic Control  Immunotherapy:  Immunosuppressants     None        Steroids:   Hormones     Start     Stop Route Frequency Ordered    07/27/17 1400  vasopressin (PITRESSIN) 100 Units in dextrose 5 % 100 mL infusion      -- IV Continuous " 07/27/17 1403        Pressors:    Autonomic Drugs     Start     Stop Route Frequency Ordered    07/27/17 1353  EPINEPHrine (ADRENALIN) 4 mg in sodium chloride 0.9% 250 mL infusion      -- IV Continuous 07/27/17 1355        Hyperglycemia/Diabetes Medications: Antihyperglycemics     Start     Stop Route Frequency Ordered    07/27/17 1445  insulin regular (Humulin R) 100 Units in sodium chloride 0.9% 100 mL infusion      -- IV Continuous 07/27/17 1344

## 2017-07-28 NOTE — NURSING
OR team at bedside to prepare to take patient to OR for LVAD closure. MD on phone with family for consent.

## 2017-07-28 NOTE — TRANSFER OF CARE
"Anesthesia Transfer of Care Note    Patient: Suman Hayden    Procedure(s) Performed: Procedure(s) (LRB):  CLOSURE-STERNAL WOUND (N/A)  PLACEMENT-NEOPERICARDIUM (N/A)    Patient location: ICU    Anesthesia Type: general    Transport from OR: Upon arrival to PACU/ICU, patient attached to 100% O2 by T-piece with adequate spontaneous ventilation. Continuous ECG monitoring in transport. Continuous SpO2 monitoring in transport. Continuos invasive BP monitoring in transport. Continuous CVP monitoring in transport. Continuous PA pressure monitoring in transport    Post pain: adequate analgesia    Post assessment: no apparent anesthetic complications    Post vital signs: stable    Level of consciousness: sedated    Nausea/Vomiting: no nausea/vomiting    Complications: none    Transfer of care protocol was followed      Last vitals:   Visit Vitals  BP (!) 87/70 (BP Location: Right arm, Patient Position: Lying, BP Method: Automatic)   Pulse 79   Temp 37.3 °C (99.2 °F) (Core ([place]))   Resp 16   Ht 5' 8" (1.727 m)   Wt 79.7 kg (175 lb 11.3 oz)   SpO2 95%   BMI 26.72 kg/m²     "

## 2017-07-28 NOTE — PT/OT/SLP PROGRESS
Physical Therapy      Suman Hayden  MRN: [id number]    Patient not seen today secondary to  (pt off the floor for chest closure during attempt). Will follow-up as appropriate.    Sada Vu, PT   7/28/2017

## 2017-07-28 NOTE — PROGRESS NOTES
Arrhythmia clinic  Order received to restore ICD post OR.  Reprogrammed from DOO 80bpm -->DDD 80 bpm and ICD tachy detection and therapies restored at bedside.  Pt is followed elsewhere.

## 2017-07-28 NOTE — PROGRESS NOTES
Pt returned to ICU86 from OR per anesthesia, connected to bedside tele monitor and ventilator. All VSS. Dr. Espinoza @ bedside. New orders received and carried out. See flowsheet for full assessment. Will continue to monitor.

## 2017-07-28 NOTE — PROGRESS NOTES
Daily E and M and VAD Interrogation Note    Reason for Visit:  Patient is seen in follow up for management of:  [] HeartMate II  [] Heartware [] Total artificial heart       [] ECMO           [x] Other - HM III     Interval History:  [x] Interval history unobtainable due to intubation.  The [x] implant/[] explant date was 7/27/17    Events overnight - Patient stable o/n. Required transfusion of 1 u PRBCs. Otherwise, ventilator and pressor requirements stable; Cr stable.      Review of Systems:   Unable to obtain     Medications:  Current Facility-Administered Medications   Medication Dose Route Frequency Provider Last Rate Last Dose    0.9%  NaCl infusion (for blood administration)   Intravenous Q24H PRN Edwige Clay MD        0.9%  NaCl infusion   Intravenous Continuous Shayne Espinoza MD 10 mL/hr at 07/27/17 1451      albumin human 5% bottle 500 mL  500 mL Intravenous PRN Shayne Espinoza MD   500 mL at 07/27/17 1701    albuterol nebulizer solution 2.5 mg  2.5 mg Nebulization Q4H PRN Shayne Espinoza MD        albuterol nebulizer solution 2.5 mg  2.5 mg Nebulization Q4H PRN Shayne Espinoza MD        aspirin EC tablet 325 mg  325 mg Oral Daily Shayne Espinoza MD   Stopped at 07/27/17 1453    aspirin tablet 325 mg  325 mg Per NG tube Daily Shayne Espinoza MD   Stopped at 07/27/17 2000    bacitracin injection    PRN Sunny Downing MD   50,000 Units at 07/28/17 0839    bisacodyl suppository 10 mg  10 mg Rectal Daily PRN Shayne Espinoza MD        cefepime in dextrose 5 % 1 gram/50 mL IVPB 1 g  1 g Intravenous Q8H Shayne Espinoza  mL/hr at 07/28/17 0325 1 g at 07/28/17 0821    dextrose 5 % and 0.45 % NaCl with KCl 40 mEq infusion   Intravenous Continuous Shayne Espinoza MD 10 mL/hr at 07/27/17 1800      dextrose 50% injection 12.5 g  12.5 g Intravenous PRN Shayne Espinoza MD        dextrose 50% injection 25 g  25 g Intravenous PRN Shayne Espinoza MD         DOBUtamine 1000 mg in D5W 250 mL infusion (premix non-titrating)  4 mcg/kg/min (Dosing Weight) Intravenous Continuous Sunny Downing MD 4.8 mL/hr at 07/28/17 0707 4 mcg/kg/min at 07/28/17 0707    docusate sodium capsule 200 mg  200 mg Oral QHS Shayne Espinoza MD        EPINEPHrine (ADRENALIN) 4 mg in sodium chloride 0.9% 250 mL infusion  0.1 mcg/kg/min (Dosing Weight) Intravenous Continuous Shayne Espinoza MD 24 mL/hr at 07/28/17 0707 0.08 mcg/kg/min at 07/28/17 0707    fentaNYL injection 25 mcg  25 mcg Intravenous Q4H PRN Sunny Downing MD   150 mcg at 07/28/17 0814    ferrous gluconate tablet 324 mg  324 mg Oral Daily with breakfast Shayne Espinoza MD        fluconazole (DIFLUCAN) IVPB 400 mg 200 mL  400 mg Intravenous Q24H Shayne Espinoza MD   400 mg at 07/28/17 0821    insulin regular (Humulin R) 100 Units in sodium chloride 0.9% 100 mL infusion  1 Units/hr Intravenous Continuous Jasmyn Dick DNP, NP 0.8 mL/hr at 07/28/17 0600 0.8 Units/hr at 07/28/17 0600    magnesium hydroxide 400 mg/5 ml suspension 2,400 mg  30 mL Oral Daily PRN Shayne Espinoza MD        magnesium sulfate 2g in water 50mL IVPB (premix)  2 g Intravenous PRN Shayne Espinoza MD        mupirocin 2 % ointment   Nasal BID Shayne Espinoza MD        niCARdipine 40 mg/200 mL infusion  1 mg/hr Intravenous Continuous Shayne Espinoza MD 10 mL/hr at 07/28/17 0707 2 mg/hr at 07/28/17 0707    oxycodone immediate release tablet 10 mg  10 mg Oral Q4H PRN Shayne Espinoza MD        oxycodone immediate release tablet 5 mg  5 mg Oral Q4H PRN Shayne Espinoza MD        pantoprazole EC tablet 40 mg  40 mg Oral Daily Shayne Espinoza MD        pantoprazole injection 40 mg  40 mg Intravenous Daily Shayne Espinoza MD   40 mg at 07/27/17 1512    potassium chloride 20 mEq in 100 mL IVPB (FOR CENTRAL LINE ADMINISTRATION ONLY)  40 mEq Intravenous PRN Shayne Espinoza MD 50 mL/hr at 07/28/17 0519 40  mEq at 07/28/17 0519    potassium chloride 20 mEq in 100 mL IVPB (FOR CENTRAL LINE ADMINISTRATION ONLY)  40 mEq Intravenous PRN Shayne Espinoza MD        pravastatin tablet 20 mg  20 mg Oral QHS Azfar Niurka Payton MD   20 mg at 07/27/17 2115    propofol (DIPRIVAN) 10 mg/mL infusion  5 mcg/kg/min (Dosing Weight) Intravenous Continuous Shayne Espinoza MD 19.2 mL/hr at 07/28/17 0650 40 mcg/kg/min at 07/28/17 0650    sodium chloride 0.9% flush 3 mL  3 mL Intravenous Q8H Shayne Espinoza MD   3 mL at 07/28/17 0522    sodium phosphate 15 mmol in dextrose 5 % 250 mL IVPB  15 mmol Intravenous PRN Edwige Clay MD 83.3 mL/hr at 07/28/17 0300 15 mmol at 07/28/17 0300    sodium phosphate 20.01 mmol in dextrose 5 % 250 mL IVPB  20.01 mmol Intravenous PRN Edwige Clay MD        sodium phosphate 30 mmol in dextrose 5 % 250 mL IVPB  30 mmol Intravenous PRN Edwige Clay MD        vancomycin (VANCOCIN) 750 mg in dextrose 5 % 250 mL IVPB  750 mg Intravenous Q24H Shayne Espinoza .7 mL/hr at 07/28/17 0500 750 mg at 07/28/17 0821    vasopressin (PITRESSIN) 100 Units in dextrose 5 % 100 mL infusion  0.02 Units/min Intravenous Continuous Sunny Downing MD 0.1 mL/hr at 07/28/17 0707 0.02 Units/hr at 07/28/17 0707     Facility-Administered Medications Ordered in Other Encounters   Medication Dose Route Frequency Provider Last Rate Last Dose    midazolam injection   Intravenous PRN Justus Tanner MD   4 mg at 07/28/17 0712    rocuronium injection    PRN Justus Tanner MD   50 mg at 07/28/17 0728       Physical Examination:  Vital Signs:   Vitals:    07/28/17 0630   BP:    Pulse: 79   Resp: 16   Temp:      Cardiovascular:  [x] Regular rate and rhythm [] Irregular []  None (MATT) []  Other  []  No edema [x]  Edema present  [x]  Clear to auscultation  []  Rales to []  Coarse  []  No rales but   [] Pedal Pulses absent  [x]  Pulses  + throughout  Skin:  Incision is [x]  Clean,  dry and intact.  []  Other   Sternum:  []  Stable [x]  Unstable  Driveline(s):   [x]  Clean, dry and intact. []  Other       Labs:  BMP  Lab Results   Component Value Date     (L) 07/28/2017    K 3.8 07/28/2017     07/28/2017    CO2 21 (L) 07/28/2017    BUN 26 (H) 07/28/2017    CREATININE 1.3 07/28/2017    CALCIUM 9.0 07/28/2017    ANIONGAP 11 07/28/2017    ESTGFRAFRICA >60.0 07/28/2017    EGFRNONAA 56.5 (A) 07/28/2017       Magnesium   Date Value Ref Range Status   07/28/2017 2.4 1.6 - 2.6 mg/dL Final       Lab Results   Component Value Date    WBC 9.68 07/28/2017    HGB 8.4 (L) 07/28/2017    HCT 24.6 (L) 07/28/2017    MCV 85 07/28/2017    PLT 79 (L) 07/28/2017       Lab Results   Component Value Date    INR 1.2 07/28/2017    INR 1.3 (H) 07/28/2017    INR 1.3 (H) 07/27/2017       BNP   Date Value Ref Range Status   07/28/2017 1,184 (H) 0 - 99 pg/mL Final     Comment:     Values of less than 100 pg/ml are consistent with non-CHF populations.   07/24/2017 809 (H) 0 - 99 pg/mL Final     Comment:     Values of less than 100 pg/ml are consistent with non-CHF populations.   07/21/2017 912 (H) 0 - 99 pg/mL Final     Comment:     Values of less than 100 pg/ml are consistent with non-CHF populations.       LD   Date Value Ref Range Status   07/23/2017 519 (H) 110 - 260 U/L Final     Comment:     Results are increased in hemolyzed samples.   07/06/2017 185 110 - 260 U/L Final     Comment:     Results are increased in hemolyzed samples.       X-Rays:  [x]  I reviewed today's Chest x-ray    Procedure:  Device Interrogation including analysis of device parameters.  Current Settings   [x]  Ventricular Assist Device  []  Total Artificial Heart interrogated  Review of device function is [x]  Stable []  Other   TXP LVAD INTERROGATIONS 7/28/2017 7/28/20172017 7/28/2017 7/28/2017 7/28/2017 7/28/2017 7/28/2017   Type HeartMate3 HeartMate3 HeartMate3 HeartMate3 HeartMate3 HeartMate3 HeartMate3   Flow 3.7 3.7 3.7 3.7 3.8 3.6 3.4    Speed 4900 4900 4900 4900 4900 4900 4900   PI 3.9 3.9 3.8 3.9 3.5 4.7 4.5   Power ([place]) 3.1 3.2 3.2 3.2 3.2 3.2 3.2   LSL - - - 4500 - - -   Pulsatility - - - Intermittent pulse - - -       Assessment:  [x]  Primary Cardiomyopathy []  Congestive Heart Failure   []  Atrial Fibrillation []  Ventricular Tachycardia   []  Aftercare cardiac device []  Long term (current) use of anticoagulants   []  Ventilator-associated pneumonia []  Pneumonia viral, unspecified   []  Pneumonia, bacterial, unspecified []  Pneumonia, organism unspecified   []  Hemorrhage of GI tract, unspecified    []  Nosebleed []  Driveline infection   []  Infection VAD device []  New onset of    []        Plan:  [x]  Interval history obtained from ICU attending team member during rounding today  []  VAD/MATT teaching performed with patient  []  Mobilization / Physical Therapy ongoing  []  Anticoagulation []  Ongoing []  Held  []  Studies ordered  [x]   To OR today for closure.       Total time spent was 30 minutes.  Of which more than 50 percent of the care dominated counseling and coordinating care with different team members. The VAD was interrogated and all parameters were WNL and no significant findings were found in the history. All these findings are documented in the note above.    Neuro:  - Sedated, intubated.   - Continue current pain control regimen    Resp:  - Intubated  Vent Mode: SIMV  Oxygen Concentration (%):  [] 50  Resp Rate Total:  [14 br/min-18 br/min] 16 br/min  Vt Set:  [500 mL] 500 mL  PEEP/CPAP:  [5 cmH20] 5 cmH20  Pressure Support:  [10 cmH20] 10 cmH20  Mean Airway Pressure:  [9 cmH20-10 cmH20] 10 cmH20  - Minimize supplemental O2 requirements  - Chest tubes to remain in place to wall suction    CV:  - HDS; LVAD numbers stable  - Wean pressors as tolerated  - Epi kept at 0.08 overnight, Dobutamine kept at 4 - continue at these rates  -   - Sternal closure today in OR    Heme:  - Hgb/Hct 8.4/24.6 - transfused 1 u  PRBCs o/n  - Continue to trend    ID:  - Cefepime, Diflucan, Vanc ordered postop for open sternum  - D/c Diflucan after closure.     Renal:  - Singer in place  - Strict I/Os    FEN/GI:  - NPO  - ADAT to cardiac diet (low sodium, 1500 mL restriction) once extubated  - Replace lytes PRN    Endo:  - Endocrine following, appreciate assistance  - Accuchecks  - Insulin gtt    Dispo:  - Continue ICU care  - To OR for sternal closure today      Shayne Espinoza MD  General Surgery PGY-2  Pager: [phone]          Date of Service: 07/28/2017     Date of Service: 07/28/2017   Cardiac Surgery Attending E and M (VAD) Note along with VAD Interrogation    I have seen and examined the patient and agree with the findings above    I also reviewed the patients clinical course and:  [x]  Hemodynamic & Respiratory paramters  [x]  Laboratory Data  [x]  Radiological studies     VAD Interrogated [x]      VAD Function is normal. Changes made [x]  None [x]  Increased speed to 5000rpm with lower limit changed to 4600rpm.    Interrogation of Ventricular assist device was performed with physician analysis of device parameters and review of device function. I have personally reviewed the interrogation findings and agree with findings as stated.

## 2017-07-28 NOTE — ANESTHESIA PROCEDURE NOTES
Sweeny Carmne Line    Diagnosis: CHF  Doctor requesting consult: [place]  Patient location during procedure: done in OR  Procedure start time: 7/28/2017 10:00 AM  Timeout: 7/28/2017 10:00 AM  Procedure end time: 7/28/2017 10:20 AM  Staffing  Anesthesiologist: RAEGAN HUSSEIN  Resident/CRNA: CHERIE WHITE  Performed: resident/CRNA   Anesthesiologist was present at the time of the procedure.  Preanesthetic Checklist  Completed: patient identified, site marked, surgical consent, pre-op evaluation, timeout performed, IV checked, risks and benefits discussed, monitors and equipment checked and anesthesia consent given  [place]y Carmen Line  Skin Prep: chlorhexidine gluconate  Device: CCO/Oximetric CatheterSterile sheath used  Locked at: 55 cm.  Indication: hemodynamic monitoring, intravenous therapy  Assessment  Central Line Bundle Protocol followed. Hand hygiene before procedure, surgical cap worn, surgical mask worn, sterile surgical gloves worn, large sterile drape used.  Additional Notes  Insertion through existing MAC line as previous SG catheter was malfunctioning

## 2017-07-28 NOTE — PLAN OF CARE
Problem: Patient Care Overview  Goal: Plan of Care Review  Outcome: Ongoing (interventions implemented as appropriate)  Patient remains intubated on SIMV fio2 50%, peep 5 and 18 ppm of nitric. Diprivan infusing for sedation, pain controlled with prn fentanyl. HR 70-80, T max 99.3. MAP being maintained < 80 with cardene gtt. Vasopressin and epinephrine infusing @ nont-titrating rates. Insulin gtt being titrated per protocol. CVP 9-14. 1 u PRBC given. Urine output 50-75 ml/hr. OG tube to LIS. Chest open, VAD dressing dry and intact, flows and speeds as charted no acute events.Left groin site/seath intact.  SVo2 this AM 68. Continue antibiotics. Continue to monitor labs and renal function, replace as necessary. Anticipate return to OR for chest closure. Plan of care reviewed further teaching necessary.

## 2017-07-28 NOTE — PLAN OF CARE
Problem: Patient Care Overview  Goal: Plan of Care Review  Outcome: Ongoing (interventions implemented as appropriate)  1. Once extubated, advance PO diet order to Cardiac as tolerated.   2. Add 2 packets Beneprotein to each tray to meet increased protein needs.   3. If PO intake poor >48 hrs, add Boost Plus TID to supplement intake.   4. If pt remains intubated >72 hrs, begin enteral feeds with Impact Peptide 1.5.

## 2017-07-28 NOTE — PROGRESS NOTES
"[facility]  Endocrinology  Progress Note    Admit Date: 7/18/2017     Reason for Consult: Management of  Hyperglycemia     Surgical Procedure and Date: 7/27/17 s/p LAVD     HPI: 67 y.o. gentleman with PNICM (EF 10%) on home  at 5 mcg/kg/min, esophagitis, HTN, HLD, s/p [employer] CRT-D,SOB (NYHA Class III), 3 pillow orthopnea. He is now s/p LVAD placement and endocrine in consulted for bg management.     Interval HPI:   Pt is currently off pressors.  Remains intubated.  On intensive insulin iv protocol.  No hypoglycemia.  BG at or near goal.    BP (!) 88/0   Pulse 103   Temp 99.3 °F (37.4 °C) (Core ([place]))   Resp 18   Ht 5' 8" (1.727 m)   Wt 79.7 kg (175 lb 11.3 oz)   SpO2 100%   BMI 26.72 kg/m²       Labs Reviewed and Include      Recent Labs  Lab 07/28/17  0357  07/28/17  1513   *  < > 128*   CALCIUM 9.0  < > 8.6*   ALBUMIN 4.1  --   --    PROT 6.3  --   --    *  < > 133*   K 3.8  < > 4.5   CO2 21*  < > 19*     < > 103   BUN 26*  < > 25*   CREATININE 1.3  < > 1.2   ALKPHOS 32*  --   --    ALT 18  --   --    AST 99*  --   --    BILITOT 2.6*  --   --    < > = values in this interval not displayed.  Lab Results   Component Value Date    WBC 10.78 07/28/2017    HGB 9.1 (L) 07/28/2017    HCT 26.9 (L) 07/28/2017    MCV 84 07/28/2017     (L) 07/28/2017     No results for input(s): TSH, FREET4 in the last 168 hours.  Lab Results   Component Value Date    HGBA1C 5.4 07/06/2017       Nutritional status:   Body mass index is 26.72 kg/m².  Lab Results   Component Value Date    ALBUMIN 4.1 07/28/2017    ALBUMIN 3.0 (L) 07/27/2017    ALBUMIN 3.0 (L) 07/27/2017     Lab Results   Component Value Date    PREALBUMIN 13 (L) 07/28/2017    PREALBUMIN 18 (L) 07/06/2017       Estimated Creatinine Clearance: 57.8 mL/min (based on Cr of 1.2).    Accu-Checks  Recent Labs      07/28/17   0005  07/28/17   0102  07/28/17   0201  07/28/17   0300  07/28/17   0355  07/28/17   0456  " 07/28/17   0624  07/28/17   1047  07/28/17   1156  07/28/17   1404   POCTGLUCOSE  133*  139*  151*  137*  175*  162*  209*  200*  165*  120*       Current Medications and/or Treatments Impacting Glycemic Control  Immunotherapy:  Immunosuppressants     None        Steroids:   Hormones     Start     Stop Route Frequency Ordered    07/27/17 1400  vasopressin (PITRESSIN) 100 Units in dextrose 5 % 100 mL infusion      -- IV Continuous 07/27/17 1403        Pressors:    Autonomic Drugs     Start     Stop Route Frequency Ordered    07/27/17 1353  EPINEPHrine (ADRENALIN) 4 mg in sodium chloride 0.9% 250 mL infusion      -- IV Continuous 07/27/17 1355        Hyperglycemia/Diabetes Medications: Antihyperglycemics     Start     Stop Route Frequency Ordered    07/27/17 1445  insulin regular (Humulin R) 100 Units in sodium chloride 0.9% 100 mL infusion      -- IV Continuous 07/27/17 1346          ASSESSMENT and PLAN    Hyperglycemia    bg goal 140-180,   Continue intensive insulin iv protocol, change bg monitoring to q2h    Discharge planning: tbd          INDIRA (acute kidney injury)    Body mass index is 26.72 kg/m².  Avoid hypoglycemia           Acute on chronic combined systolic and diastolic heart failure    S/p lvad          CHF (NYHA class IV, ACC/AHA stage D)    S/p lvad          Nonischemic cardiomyopathy    S/p lvad                YURI Mathis, INESSAP  Endocrinology  [facility]

## 2017-07-28 NOTE — ANESTHESIA PROCEDURE NOTES
Baseline GLENN    Diagnosis: heart failure   Patient location during procedure: OR  Procedure start time: 7/28/2017 8:27 AM  Timeout: 7/28/2017 8:25 AM  Procedure end time: 7/28/2017 8:47 AM  Exam type: Baseline  Staffing  Anesthesiologist: RAEGAN HUSSEIN  Performed: anesthesiologist   Setup & Induction  Patient preparation: bite block inserted  Probe Insertion: easy  Exam: complete  Exam     Left Heart    Left appendage velocity:80        LVAD:yes  Inflow Cannula and Direction: midline  Estimated Ejection Fraction: < 25% severe  Regional Wall Abnormalities: RWMA present            Right Heart  Right Ventricle: dilated  Right Ventricle Function: mildly decreased, RA Device    Intra Atrial Septum  PFO: no shunt by color flow doppler  no IAS aneurysm  no lipomatous hypertrophy  no Atrial Septal Defect (Asd)    Right Ventricle  Size: dilated, Free Wall Thickness: RVH >/= 0.5cm    Aortic Valve:  Stenosis: none  Morphology: trileaflet  Regurgitation: no aortic valve regurgitation     Mitral Valve:  Morphology:normal  Jet Description: mild and centrally-directed    Tricuspid Valve:  Morphology: normal  Regurgitation: moderate    Pulmonic Valve:  Morphology:normal  Regurgitation(color flow): none    Great Vessels  Ascending Aorta Atherosclerosis: 1=nl-min dz  Aortic Arch Atherosclerosis: 1=nl-min dz  IABP: no  Descending Aorta Atherosclerosis: 1=nl-min dz  Aorta    Descending aorta IABP: no    Effusions  Effusions: none    Summary  Findings discussed with surgeon.    Other Findings   S/p LVAD insertion.  Inflow cannula midline and central.  No obstruction  Outflow graft not well seen.  Mild MR,  Moderate TR.  Trace AI.  No other valvular abnormalities.

## 2017-07-28 NOTE — PROGRESS NOTES
Dr. Espinoza @ bedside. Informed CVP 12, UOP 25mL. Lasix gtt @ 5mL/hr. Per rhythm on monitor, pt appears to be going into Vtach and AICD shocking out of it, body tremors noted, pt follows commands, pupils 2mm and reactive. Dr. Downing updated per Dr. Espinoza. 1600 labs sent early, order received to bolus 10mg lasix and increase lasix gtt to 20mg/hr. Order to interrogate AICD. Will continue to monitor.

## 2017-07-28 NOTE — PROGRESS NOTES
Progress Note  Surgical Intensive Care    Admit Date: 7/18/2017  Post-operative Day: Day of Surgery  Hospital Day: 11    SUBJECTIVE:     Follow-up For:  Procedure(s) (LRB):  CLOSURE-STERNAL WOUND (N/A)  INSERTION-RIGHT VENTRICULAR ASSIST DEVICE (N/A)    HPI: Patient is a 67 y.o. male with NICM (EF 10%) on home dobutamine at 5 mcg/kg/hr and acute on chronic systolic and diastolic heart failure (NYHA class IV), HTN, hyperlipidemia s/p Medtronic CRT-D. He was originally in the process of getting a VAD but was diagnosed with Hep B, for which hepatology recommended repeat viral studies/US and liver biopsy. He was admitted at [facility] the [facility] on 7/18. He went to the OR today for LVAD insertion.     He presents to the SICU intubated and on the vent, on dobutamine gtt 5, Epi 0.09, cardene 2.5mg/hr, vaso 0.02, propofol 10.    Interval history: going to OR today for closure of sternotomy, remained intubated and sedated overnight. No acute events.    Continuous Infusions:   sodium chloride 0.9% 10 mL/hr at 07/27/17 1451    dextrose 5 % and 0.45 % NaCl with KCl 40 mEq 10 mL/hr at 07/27/17 1800    DOBUTamine 4 mcg/kg/min (07/28/17 0600)    epinephrine infusion (NON-TITRATING) 0.08 mcg/kg/min (07/28/17 0600)    insulin (HUMAN R) infusion (adults) 0.8 Units/hr (07/28/17 0600)    nicardipine 2 mg/hr (07/28/17 0600)    propofol 25 mcg/kg/min (07/28/17 0600)    vasopressin (PITRESSIN) infusion 0.02 Units/min (07/28/17 0600)     Scheduled Meds:   aspirin  325 mg Oral Daily    aspirin  325 mg Per NG tube Daily    ceFEPime (MAXIPIME) IVPB  1 g Intravenous Q8H    docusate sodium  200 mg Oral QHS    ferrous gluconate  324 mg Oral Daily with breakfast    fluconazole (DIFLUCAN) IVPB  400 mg Intravenous Q24H    mupirocin   Nasal BID    pantoprazole  40 mg Oral Daily    pantoprazole  40 mg Intravenous Daily    pravastatin  20 mg Oral QHS    sodium chloride 0.9%  3 mL Intravenous Q8H    vancomycin (VANCOCIN) IVPB  750 mg  Intravenous Q24H     PRN Meds:sodium chloride, albumin human 5%, albuterol sulfate, albuterol sulfate, bisacodyl, dextrose 50%, dextrose 50%, fentaNYL, magnesium hydroxide 400 mg/5 ml, magnesium sulfate IVPB, oxycodone, oxycodone, potassium chloride, potassium chloride, sodium phosphate IVPB, sodium phosphate IVPB, sodium phosphate IVPB    Review of patient's allergies indicates:  No Known Allergies    OBJECTIVE:     Vital Signs (Most Recent)  Temp: 99.2 °F (37.3 °C) (07/28/17 0600)  Pulse: 79 (07/28/17 0630)  Resp: 16 (07/28/17 0630)  BP: (!) 87/70 (07/28/17 0000)  SpO2: 95 % (07/28/17 0630)    Vital Signs Range (Last 24H):  Temp:  [97.5 °F (36.4 °C)-99.3 °F (37.4 °C)]   Pulse:  [60-80]   Resp:  [14-30]   BP: ()/(68-74)   SpO2:  [84 %-100 %]   Arterial Line BP: ()/(56-89)     I & O (Last 24H):  Intake/Output Summary (Last 24 hours) at 07/28/17 0757  Last data filed at 07/28/17 0600   Gross per 24 hour   Intake             5719 ml   Output             3366 ml   Net             2353 ml     Ventilator Data (Last 24H):     Vent Mode: SIMV  Oxygen Concentration (%):  [] 50  Resp Rate Total:  [14 br/min-18 br/min] 16 br/min  Vt Set:  [500 mL] 500 mL  PEEP/CPAP:  [5 cmH20] 5 cmH20  Pressure Support:  [10 cmH20] 10 cmH20  Mean Airway Pressure:  [9 cmH20-10 cmH20] 10 cmH20    Hemodynamic Parameters (Last 24H):  PAP: (36-44)/(17-31) 42/21  PAP (Mean):  [23 mmHg-35 mmHg] 28 mmHg  CO:  [4 L/min-5.4 L/min] 4.7 L/min  CI:  [2.3 L/min/m2-3.1 L/min/m2] 2.7 L/min/m2    Physical Exam   Constitutional: He appears well-developed and well-nourished. No distress.   HENT:   Head: Normocephalic and atraumatic.   Eyes: Right eye exhibits no discharge. Left eye exhibits no discharge. No scleral icterus.   Neck: Normal range of motion. Neck supple.   Cardiovascular: Intact distal pulses.    LVAD hum present   Pulmonary/Chest: Effort normal. No respiratory distress. He has wheezes.   R and L meds chest tubes in  place.  Bandage over sternotomy incision   Abdominal: Soft. He exhibits no distension.   Skin: Skin is warm and dry.     Laboratory (Last 24H):  CBC:    Recent Labs  Lab 07/28/17  0357   WBC 9.68   HGB 8.4*   HCT 24.6*   PLT 79*     CMP:    Recent Labs  Lab 07/28/17  0357   CALCIUM 9.0   ALBUMIN 4.1   PROT 6.3   *   K 3.8   CO2 21*      BUN 26*   CREATININE 1.3   ALKPHOS 32*   ALT 18   AST 99*   BILITOT 2.6*       Chest X-Ray: I personally reviewed the films and findings are:stable      ASSESSMENT/PLAN:       Plan:     Neuro:   -Pain control fentanyl and oxycodone  -propofol 25 for sedation     Pulmonary:   -intubated  Vent Mode: SIMV  Oxygen Concentration (%):  [] 50  Resp Rate Total:  [14 br/min-18 br/min] 16 br/min  Vt Set:  [500 mL] 500 mL  PEEP/CPAP:  [5 cmH20] 5 cmH20  Pressure Support:  [10 cmH20] 10 cmH20  Mean Airway Pressure:  [9 cmH20-10 cmH20] 10 cmH20  Chest tubes - R meds and L meds   May wean to extubate after OR     Cardiac:  -MAP range goal >65  -Dobutamine gtt of 4,   -Epi of 0.08  -cardene 2 mg/hr  -vaso 0.02  -BNP was 1184  -wean as tolerated     Renal:   -Singer in place  -Bun/Cr 26/1.3  -UOP 0.9 cc/kg/hr over the last shift     Fluids/Electrolytes/Nutrition/GI:   -Nutritional status: NPO  -NGT for meds  -replace lytes PRN     Hematology/Oncology:  -H/H 8.4/24.6  -INR/Plts 1.2/79  -DVT ppx: hold post op  -Aspirin 325mg     Infectious Disease:   -Afebrile   -WBC 9.68  -Ancef, cefepime, doxycycline, fluconazole, vanc  -Cultures NGTD     Endocrine:  -Glucose 180s, goal of 140-180  -insulin gtt, accuchecks q1     Dispo:  -Continue care in the ICU setting  -To OR today for closure of sternotomy      Vince Murguia PGY-1  [phone]  07/28/2017

## 2017-07-28 NOTE — ANESTHESIA POSTPROCEDURE EVALUATION
"Anesthesia Post Evaluation    Patient: Suman Hayden    Procedure(s) Performed: Procedure(s) (LRB):  CLOSURE-STERNAL WOUND (N/A)  PLACEMENT-NEOPERICARDIUM (N/A)    Final Anesthesia Type: general  Patient location during evaluation: ICU  Patient participation: No - Unable to Participate, Intubation  Level of consciousness: sedated  Post-procedure vital signs: reviewed and stable  Pain management: adequate  Airway patency: patent  PONV status at discharge: No PONV  Anesthetic complications: no      Cardiovascular status: hemodynamically stable  Respiratory status: intubated and ventilator  Hydration status: euvolemic  Follow-up not needed.        Visit Vitals  BP (!) 109/57   Pulse 80   Temp 35.8 °C (96.4 °F)   Resp (!) 3   Ht 5' 8" (1.727 m)   Wt 79.7 kg (175 lb 11.3 oz)   SpO2 95%   BMI 26.72 kg/m²       Pain/Gurpreet Score: Pain Assessment Performed: Yes (7/28/2017  3:00 AM)  Presence of Pain: non-verbal indicators absent (7/28/2017  3:00 AM)  Pain Rating Prior to Med Admin: 10 (7/28/2017  1:40 AM)  Pain Rating Post Med Admin: 0 (7/28/2017  2:10 AM)      "

## 2017-07-28 NOTE — NURSING
Spoke to Dr. Downing and updated on patient status, hemodynamics, and recent lab results. Orders received.

## 2017-07-29 PROBLEM — I50.9 HEART FAILURE: Status: ACTIVE | Noted: 2017-07-28

## 2017-07-29 PROBLEM — I48.91 ATRIAL FIBRILLATION: Status: ACTIVE | Noted: 2017-07-29

## 2017-07-29 PROBLEM — Z95.811 LVAD (LEFT VENTRICULAR ASSIST DEVICE) PRESENT: Status: ACTIVE | Noted: 2017-07-29

## 2017-07-29 PROBLEM — Z95.810 AICD (AUTOMATIC CARDIOVERTER/DEFIBRILLATOR) PRESENT: Status: ACTIVE | Noted: 2017-07-29

## 2017-07-29 LAB
ALLENS TEST: ABNORMAL
ANION GAP SERPL CALC-SCNC: 10 MMOL/L
ANION GAP SERPL CALC-SCNC: 11 MMOL/L
ANION GAP SERPL CALC-SCNC: 12 MMOL/L
ANION GAP SERPL CALC-SCNC: 12 MMOL/L
ANION GAP SERPL CALC-SCNC: 13 MMOL/L
ANISOCYTOSIS BLD QL SMEAR: SLIGHT
APTT BLDCRRT: 32.3 SEC
APTT BLDCRRT: 32.6 SEC
APTT BLDCRRT: 33 SEC
APTT BLDCRRT: 33.4 SEC
APTT BLDCRRT: 36.7 SEC
BACTERIA BLD CULT: NORMAL
BACTERIA BLD CULT: NORMAL
BACTERIA UR CULT: NORMAL
BASO STIPL BLD QL SMEAR: ABNORMAL
BASO STIPL BLD QL SMEAR: ABNORMAL
BASOPHILS # BLD AUTO: 0.01 K/UL
BASOPHILS # BLD AUTO: 0.02 K/UL
BASOPHILS # BLD AUTO: 0.03 K/UL
BASOPHILS NFR BLD: 0.1 %
BASOPHILS NFR BLD: 0.2 %
BUN SERPL-MCNC: 24 MG/DL
BUN SERPL-MCNC: 24 MG/DL
BUN SERPL-MCNC: 25 MG/DL
BUN SERPL-MCNC: 25 MG/DL
BUN SERPL-MCNC: 26 MG/DL
BURR CELLS BLD QL SMEAR: ABNORMAL
BURR CELLS BLD QL SMEAR: ABNORMAL
CALCIUM SERPL-MCNC: 8.4 MG/DL
CALCIUM SERPL-MCNC: 8.5 MG/DL
CALCIUM SERPL-MCNC: 8.5 MG/DL
CALCIUM SERPL-MCNC: 8.6 MG/DL
CALCIUM SERPL-MCNC: 8.8 MG/DL
CHLORIDE SERPL-SCNC: 103 MMOL/L
CHLORIDE SERPL-SCNC: 104 MMOL/L
CHLORIDE SERPL-SCNC: 105 MMOL/L
CO2 SERPL-SCNC: 19 MMOL/L
CO2 SERPL-SCNC: 20 MMOL/L
CO2 SERPL-SCNC: 20 MMOL/L
CREAT SERPL-MCNC: 1.2 MG/DL
DELSYS: ABNORMAL
DIFFERENTIAL METHOD: ABNORMAL
EOSINOPHIL # BLD AUTO: 0 K/UL
EOSINOPHIL # BLD AUTO: 0.1 K/UL
EOSINOPHIL NFR BLD: 0.2 %
EOSINOPHIL NFR BLD: 0.3 %
ERYTHROCYTE [DISTWIDTH] IN BLOOD BY AUTOMATED COUNT: 15.5 %
ERYTHROCYTE [DISTWIDTH] IN BLOOD BY AUTOMATED COUNT: 15.6 %
ERYTHROCYTE [DISTWIDTH] IN BLOOD BY AUTOMATED COUNT: 15.6 %
ERYTHROCYTE [DISTWIDTH] IN BLOOD BY AUTOMATED COUNT: 15.7 %
ERYTHROCYTE [DISTWIDTH] IN BLOOD BY AUTOMATED COUNT: 15.7 %
ERYTHROCYTE [SEDIMENTATION RATE] IN BLOOD BY WESTERGREN METHOD: 16 MM/H
EST. GFR  (AFRICAN AMERICAN): >60 ML/MIN/1.73 M^2
EST. GFR  (NON AFRICAN AMERICAN): >60 ML/MIN/1.73 M^2
FIO2: 50
GIANT PLATELETS BLD QL SMEAR: PRESENT
GLUCOSE SERPL-MCNC: 134 MG/DL
GLUCOSE SERPL-MCNC: 134 MG/DL
GLUCOSE SERPL-MCNC: 136 MG/DL
GLUCOSE SERPL-MCNC: 137 MG/DL
GLUCOSE SERPL-MCNC: 140 MG/DL
HCO3 UR-SCNC: 23.4 MMOL/L (ref 24–28)
HCO3 UR-SCNC: 23.5 MMOL/L (ref 24–28)
HCO3 UR-SCNC: 23.6 MMOL/L (ref 24–28)
HCO3 UR-SCNC: 24.1 MMOL/L (ref 24–28)
HCT VFR BLD AUTO: 25.7 %
HCT VFR BLD AUTO: 26.2 %
HCT VFR BLD AUTO: 26.4 %
HGB BLD-MCNC: 8.7 G/DL
HGB BLD-MCNC: 9.1 G/DL
HGB BLD-MCNC: 9.1 G/DL
INR PPP: 1.3
INR PPP: 1.3
INR PPP: 1.4
LDH SERPL L TO P-CCNC: 1.12 MMOL/L (ref 0.36–1.25)
LDH SERPL L TO P-CCNC: 1.2 MMOL/L (ref 0.36–1.25)
LDH SERPL L TO P-CCNC: 1.29 MMOL/L (ref 0.36–1.25)
LDH SERPL L TO P-CCNC: 429 U/L
LYMPHOCYTES # BLD AUTO: 1.1 K/UL
LYMPHOCYTES # BLD AUTO: 1.3 K/UL
LYMPHOCYTES # BLD AUTO: 1.3 K/UL
LYMPHOCYTES NFR BLD: 6.6 %
LYMPHOCYTES NFR BLD: 6.9 %
LYMPHOCYTES NFR BLD: 6.9 %
LYMPHOCYTES NFR BLD: 8 %
LYMPHOCYTES NFR BLD: 8.4 %
MAGNESIUM SERPL-MCNC: 2 MG/DL
MAGNESIUM SERPL-MCNC: 2.2 MG/DL
MCH RBC QN AUTO: 28.2 PG
MCH RBC QN AUTO: 28.2 PG
MCH RBC QN AUTO: 28.4 PG
MCH RBC QN AUTO: 28.7 PG
MCH RBC QN AUTO: 29.1 PG
MCHC RBC AUTO-ENTMCNC: 33.9 G/DL
MCHC RBC AUTO-ENTMCNC: 34.5 G/DL
MCHC RBC AUTO-ENTMCNC: 34.7 G/DL
MCV RBC AUTO: 83 FL
MCV RBC AUTO: 84 FL
MCV RBC AUTO: 84 FL
MODE: ABNORMAL
MONOCYTES # BLD AUTO: 1.4 K/UL
MONOCYTES # BLD AUTO: 1.5 K/UL
MONOCYTES # BLD AUTO: 1.8 K/UL
MONOCYTES # BLD AUTO: 1.9 K/UL
MONOCYTES # BLD AUTO: 2 K/UL
MONOCYTES NFR BLD: 10.9 %
MONOCYTES NFR BLD: 11.4 %
MONOCYTES NFR BLD: 11.6 %
MONOCYTES NFR BLD: 8.8 %
MONOCYTES NFR BLD: 8.9 %
NEUTROPHILS # BLD AUTO: 12.9 K/UL
NEUTROPHILS # BLD AUTO: 13.1 K/UL
NEUTROPHILS # BLD AUTO: 13.4 K/UL
NEUTROPHILS # BLD AUTO: 13.5 K/UL
NEUTROPHILS # BLD AUTO: 13.9 K/UL
NEUTROPHILS NFR BLD: 81.2 %
NEUTROPHILS NFR BLD: 81.6 %
NEUTROPHILS NFR BLD: 81.9 %
NEUTROPHILS NFR BLD: 82.4 %
NEUTROPHILS NFR BLD: 82.8 %
PCO2 BLDA: 34 MMHG (ref 35–45)
PCO2 BLDA: 35.9 MMHG (ref 35–45)
PCO2 BLDA: 36.2 MMHG (ref 35–45)
PCO2 BLDA: 39.6 MMHG (ref 35–45)
PEEP: 5
PH SMN: 7.38 [PH] (ref 7.35–7.45)
PH SMN: 7.42 [PH] (ref 7.35–7.45)
PH SMN: 7.42 [PH] (ref 7.35–7.45)
PH SMN: 7.46 [PH] (ref 7.35–7.45)
PHOSPHATE SERPL-MCNC: 2.4 MG/DL
PHOSPHATE SERPL-MCNC: 2.4 MG/DL
PHOSPHATE SERPL-MCNC: 2.5 MG/DL
PHOSPHATE SERPL-MCNC: 2.6 MG/DL
PHOSPHATE SERPL-MCNC: 2.7 MG/DL
PLATELET # BLD AUTO: 102 K/UL
PLATELET # BLD AUTO: 82 K/UL
PLATELET # BLD AUTO: 86 K/UL
PLATELET # BLD AUTO: 97 K/UL
PLATELET # BLD AUTO: 98 K/UL
PLATELET BLD QL SMEAR: ABNORMAL
PMV BLD AUTO: 10.8 FL
PMV BLD AUTO: 11 FL
PMV BLD AUTO: 11.5 FL
PMV BLD AUTO: 12 FL
PMV BLD AUTO: 12.7 FL
PO2 BLDA: 226 MMHG (ref 80–100)
PO2 BLDA: 227 MMHG (ref 80–100)
PO2 BLDA: 231 MMHG (ref 80–100)
PO2 BLDA: 30 MMHG (ref 40–60)
POC BE: -1 MMOL/L
POC BE: 0 MMOL/L
POC SATURATED O2: 100 % (ref 95–100)
POC SATURATED O2: 57 % (ref 95–100)
POC TCO2: 25 MMOL/L (ref 23–27)
POC TCO2: 25 MMOL/L (ref 24–29)
POCT GLUCOSE: 119 MG/DL (ref 70–110)
POCT GLUCOSE: 130 MG/DL (ref 70–110)
POCT GLUCOSE: 134 MG/DL (ref 70–110)
POCT GLUCOSE: 135 MG/DL (ref 70–110)
POCT GLUCOSE: 137 MG/DL (ref 70–110)
POCT GLUCOSE: 139 MG/DL (ref 70–110)
POCT GLUCOSE: 145 MG/DL (ref 70–110)
POCT GLUCOSE: 161 MG/DL (ref 70–110)
POIKILOCYTOSIS BLD QL SMEAR: SLIGHT
POIKILOCYTOSIS BLD QL SMEAR: SLIGHT
POLYCHROMASIA BLD QL SMEAR: ABNORMAL
POLYCHROMASIA BLD QL SMEAR: ABNORMAL
POTASSIUM SERPL-SCNC: 3.9 MMOL/L
POTASSIUM SERPL-SCNC: 3.9 MMOL/L
POTASSIUM SERPL-SCNC: 4.1 MMOL/L
POTASSIUM SERPL-SCNC: 4.1 MMOL/L
POTASSIUM SERPL-SCNC: 4.5 MMOL/L
PROTHROMBIN TIME: 13.8 SEC
PROTHROMBIN TIME: 13.8 SEC
PROTHROMBIN TIME: 13.9 SEC
PROTHROMBIN TIME: 13.9 SEC
PROTHROMBIN TIME: 14.1 SEC
PS: 10
RBC # BLD AUTO: 3.06 M/UL
RBC # BLD AUTO: 3.08 M/UL
RBC # BLD AUTO: 3.08 M/UL
RBC # BLD AUTO: 3.13 M/UL
RBC # BLD AUTO: 3.17 M/UL
SAMPLE: ABNORMAL
SITE: ABNORMAL
SODIUM SERPL-SCNC: 134 MMOL/L
SODIUM SERPL-SCNC: 135 MMOL/L
SODIUM SERPL-SCNC: 136 MMOL/L
VT: 500
WBC # BLD AUTO: 15.7 K/UL
WBC # BLD AUTO: 16.18 K/UL
WBC # BLD AUTO: 16.33 K/UL
WBC # BLD AUTO: 16.44 K/UL
WBC # BLD AUTO: 17.14 K/UL

## 2017-07-29 PROCEDURE — 97530 THERAPEUTIC ACTIVITIES: CPT

## 2017-07-29 PROCEDURE — 97162 PT EVAL MOD COMPLEX 30 MIN: CPT

## 2017-07-29 PROCEDURE — A4216 STERILE WATER/SALINE, 10 ML: HCPCS | Performed by: STUDENT IN AN ORGANIZED HEALTH CARE EDUCATION/TRAINING PROGRAM

## 2017-07-29 PROCEDURE — 80048 BASIC METABOLIC PNL TOTAL CA: CPT | Mod: 91

## 2017-07-29 PROCEDURE — 25000003 PHARM REV CODE 250: Performed by: THORACIC SURGERY (CARDIOTHORACIC VASCULAR SURGERY)

## 2017-07-29 PROCEDURE — 83605 ASSAY OF LACTIC ACID: CPT

## 2017-07-29 PROCEDURE — 83050 HGB METHEMOGLOBIN QUAN: CPT

## 2017-07-29 PROCEDURE — 99232 SBSQ HOSP IP/OBS MODERATE 35: CPT | Mod: GC,,, | Performed by: INTERNAL MEDICINE

## 2017-07-29 PROCEDURE — 83735 ASSAY OF MAGNESIUM: CPT | Mod: 91

## 2017-07-29 PROCEDURE — 83615 LACTATE (LD) (LDH) ENZYME: CPT

## 2017-07-29 PROCEDURE — 97168 OT RE-EVAL EST PLAN CARE: CPT

## 2017-07-29 PROCEDURE — 99223 1ST HOSP IP/OBS HIGH 75: CPT | Mod: ,,, | Performed by: INTERNAL MEDICINE

## 2017-07-29 PROCEDURE — G8996 SWALLOW CURRENT STATUS: HCPCS | Mod: CI

## 2017-07-29 PROCEDURE — 85025 COMPLETE CBC W/AUTO DIFF WBC: CPT | Mod: 91

## 2017-07-29 PROCEDURE — 27000248 HC VAD-ADDITIONAL DAY

## 2017-07-29 PROCEDURE — 94003 VENT MGMT INPAT SUBQ DAY: CPT

## 2017-07-29 PROCEDURE — 84100 ASSAY OF PHOSPHORUS: CPT | Mod: 91

## 2017-07-29 PROCEDURE — 27000221 HC OXYGEN, UP TO 24 HOURS

## 2017-07-29 PROCEDURE — 85730 THROMBOPLASTIN TIME PARTIAL: CPT | Mod: 91

## 2017-07-29 PROCEDURE — 85610 PROTHROMBIN TIME: CPT | Mod: 91

## 2017-07-29 PROCEDURE — 82803 BLOOD GASES ANY COMBINATION: CPT

## 2017-07-29 PROCEDURE — C9113 INJ PANTOPRAZOLE SODIUM, VIA: HCPCS | Performed by: STUDENT IN AN ORGANIZED HEALTH CARE EDUCATION/TRAINING PROGRAM

## 2017-07-29 PROCEDURE — G8997 SWALLOW GOAL STATUS: HCPCS | Mod: CI

## 2017-07-29 PROCEDURE — 63600175 PHARM REV CODE 636 W HCPCS: Performed by: STUDENT IN AN ORGANIZED HEALTH CARE EDUCATION/TRAINING PROGRAM

## 2017-07-29 PROCEDURE — 25000003 PHARM REV CODE 250: Performed by: STUDENT IN AN ORGANIZED HEALTH CARE EDUCATION/TRAINING PROGRAM

## 2017-07-29 PROCEDURE — 99900026 HC AIRWAY MAINTENANCE (STAT)

## 2017-07-29 PROCEDURE — 99233 SBSQ HOSP IP/OBS HIGH 50: CPT | Mod: ,,, | Performed by: INTERNAL MEDICINE

## 2017-07-29 PROCEDURE — 20000000 HC ICU ROOM

## 2017-07-29 PROCEDURE — 99233 SBSQ HOSP IP/OBS HIGH 50: CPT | Mod: GC,,, | Performed by: ANESTHESIOLOGY

## 2017-07-29 PROCEDURE — 37799 UNLISTED PX VASCULAR SURGERY: CPT

## 2017-07-29 PROCEDURE — 92610 EVALUATE SWALLOWING FUNCTION: CPT

## 2017-07-29 PROCEDURE — 63600367 HC NITRIC OXIDE PER HOUR

## 2017-07-29 RX ORDER — INSULIN ASPART 100 [IU]/ML
0-5 INJECTION, SOLUTION INTRAVENOUS; SUBCUTANEOUS EVERY 4 HOURS PRN
Status: DISCONTINUED | OUTPATIENT
Start: 2017-07-29 | End: 2017-07-31

## 2017-07-29 RX ORDER — WARFARIN 2 MG/1
2 TABLET ORAL ONCE
Status: COMPLETED | OUTPATIENT
Start: 2017-07-29 | End: 2017-07-29

## 2017-07-29 RX ORDER — GLUCAGON 1 MG
1 KIT INJECTION
Status: DISCONTINUED | OUTPATIENT
Start: 2017-07-29 | End: 2017-07-31

## 2017-07-29 RX ADMIN — CEFEPIME HYDROCHLORIDE 1 G: 1 INJECTION, SOLUTION INTRAVENOUS at 12:07

## 2017-07-29 RX ADMIN — HEPARIN SODIUM AND DEXTROSE 200 UNITS/HR: 10000; 5 INJECTION INTRAVENOUS at 04:07

## 2017-07-29 RX ADMIN — CEFEPIME HYDROCHLORIDE 1 G: 1 INJECTION, SOLUTION INTRAVENOUS at 08:07

## 2017-07-29 RX ADMIN — FENTANYL CITRATE 50 MCG: 50 INJECTION INTRAMUSCULAR; INTRAVENOUS at 03:07

## 2017-07-29 RX ADMIN — Medication 3 ML: at 10:07

## 2017-07-29 RX ADMIN — PRAVASTATIN SODIUM 20 MG: 20 TABLET ORAL at 08:07

## 2017-07-29 RX ADMIN — PANTOPRAZOLE SODIUM 40 MG: 40 INJECTION, POWDER, FOR SOLUTION INTRAVENOUS at 09:07

## 2017-07-29 RX ADMIN — POTASSIUM CHLORIDE 40 MEQ: 200 INJECTION, SOLUTION INTRAVENOUS at 05:07

## 2017-07-29 RX ADMIN — PROPOFOL 20 MCG/KG/MIN: 10 INJECTION, EMULSION INTRAVENOUS at 07:07

## 2017-07-29 RX ADMIN — ASPIRIN 325 MG ORAL TABLET 325 MG: 325 PILL ORAL at 08:07

## 2017-07-29 RX ADMIN — CEFEPIME HYDROCHLORIDE 1 G: 1 INJECTION, SOLUTION INTRAVENOUS at 05:07

## 2017-07-29 RX ADMIN — AMIODARONE HYDROCHLORIDE 0.5 MG/MIN: 1.8 INJECTION, SOLUTION INTRAVENOUS at 11:07

## 2017-07-29 RX ADMIN — FENTANYL CITRATE 50 MCG: 50 INJECTION INTRAMUSCULAR; INTRAVENOUS at 08:07

## 2017-07-29 RX ADMIN — DOCUSATE SODIUM 200 MG: 100 CAPSULE, LIQUID FILLED ORAL at 08:07

## 2017-07-29 RX ADMIN — FUROSEMIDE 20 MG/HR: 10 INJECTION, SOLUTION INTRAMUSCULAR; INTRAVENOUS at 11:07

## 2017-07-29 RX ADMIN — POTASSIUM CHLORIDE 40 MEQ: 200 INJECTION, SOLUTION INTRAVENOUS at 08:07

## 2017-07-29 RX ADMIN — MAGNESIUM SULFATE IN WATER 2 G: 40 INJECTION, SOLUTION INTRAVENOUS at 06:07

## 2017-07-29 RX ADMIN — Medication 3 ML: at 05:07

## 2017-07-29 RX ADMIN — MUPIROCIN: 20 OINTMENT TOPICAL at 08:07

## 2017-07-29 RX ADMIN — MUPIROCIN: 20 OINTMENT TOPICAL at 09:07

## 2017-07-29 RX ADMIN — WARFARIN SODIUM 2 MG: 2 TABLET ORAL at 05:07

## 2017-07-29 RX ADMIN — SODIUM PHOSPHATE, MONOBASIC, MONOHYDRATE 15 MMOL: 276; 142 INJECTION, SOLUTION INTRAVENOUS at 10:07

## 2017-07-29 RX ADMIN — Medication 3 ML: at 02:07

## 2017-07-29 NOTE — PROGRESS NOTES
Dr. Espinoza removed L fem sheath, direct pressure held for 30minutes, hemostasis achieved, pressure dsg applied. dopplerable PT pulse prior to removal and checked q15 minutes during and post removal.

## 2017-07-29 NOTE — PLAN OF CARE
Problem: Occupational Therapy Goal  Goal: Occupational Therapy Goal  Goals to be met by:  2 weeks 8/12/17     Patient will increase functional independence with ADLs by performing:  Feeding: Independent   UE Dressing with Supervision.  LE Dressing with Supervision.  Grooming while standing with Supervision.  Toileting from toilet with Supervision for hygiene and clothing management.   Stand pivot transfers with Supervision.  Toilet transfer to toilet with Supervision.  Pt will be independent with LVAD yasmin't.     Goals and POC established today.

## 2017-07-29 NOTE — CONSULTS
[facility]  Cardiology  Consult Note    Patient Name: Suman Hayden  MRN: [id number]  Admission Date: 7/18/2017  Hospital Length of Stay: 11 days  Code Status: Prior   Attending Provider: Sunny Downing MD   Consulting Provider: Shayne Fairchild MD  Primary Care Physician: Joe Ernst MD  Principal Problem:Heart failure    Patient information was obtained from patient and ER records.     Inpatient consult to Electrophysiology  Consult performed by: SHAYNE FAIRCHILD  Consult ordered by: SHAYNE FAIRCHILD        Subjective:     Chief Complaint:  Post LVAD     HPI:   Mr. Hayden is a 67 year old man with advanced cardiomyopathy s/p recent LVAD, VT with BiV ICD who is currently in ICU recovering from LVAD. Post operatively, found to have RV lead malfunction which likely occurred during the procedure. He is hemodynamically stable, in sinus rhythm and without acute complaint. EP consulted for possible lead replacement.    Past Medical History:   Diagnosis Date    Cardiomyopathy     Hyperlipidemia     Hypertension     Obesity     S/P implantation of automatic cardioverter/defibrillator (AICD)     Ventricular tachycardia        Past Surgical History:   Procedure Laterality Date    CARDIAC CATHETERIZATION      CARDIAC DEFIBRILLATOR PLACEMENT         Review of patient's allergies indicates:  No Known Allergies    No current facility-administered medications on file prior to encounter.      Current Outpatient Prescriptions on File Prior to Encounter   Medication Sig    amiodarone (PACERONE) 200 MG Tab Take by mouth once daily.    aspirin 81 MG Chew Take 81 mg by mouth once daily.    DOBUTamine (DOBUTREX) 1,000 mg/250 mL (4,000 mcg/mL) infusion Inject 414 mcg/min into the vein continuous.    furosemide (LASIX) 40 MG tablet Take 1 tablet (40 mg total) by mouth 2 (two) times daily.    potassium chloride SA (K-DUR,KLOR-CON) 20 MEQ tablet Take 20 mEq by mouth once daily.     pravastatin  (PRAVACHOL) 20 MG tablet Take 20 mg by mouth every evening.    spironolactone (ALDACTONE) 25 MG tablet Take 1 tablet (25 mg total) by mouth once daily.     Family History     None        Social History Main Topics    Smoking status: Never Smoker    Smokeless tobacco: Never Used    Alcohol use No    Drug use: Unknown    Sexual activity: Not on file     ROS   Not obtainable 2/2 intubation    Objective:     Vital Signs (Most Recent):  Temp: 99.2 °F (37.3 °C) (07/29/17 0700)  Pulse: 97 (07/29/17 0900)  Resp: 16 (07/29/17 0700)  BP: (!) 85/65 (07/29/17 0400)  SpO2: 100 % (07/29/17 0900) Vital Signs (24h Range):  Temp:  [96.4 °F (35.8 °C)-100.7 °F (38.2 °C)] 99.2 °F (37.3 °C)  Pulse:  [] 97  Resp:  [3-23] 16  SpO2:  [85 %-100 %] 100 %  BP: ()/(0-88) 85/65  Arterial Line BP: (74-97)/(59-75) 90/71     Weight: 84.2 kg (185 lb 10 oz)  Body mass index is 28.22 kg/m².    SpO2: 100 %  O2 Device (Oxygen Therapy): ventilator      Intake/Output Summary (Last 24 hours) at 07/29/17 0934  Last data filed at 07/29/17 0800   Gross per 24 hour   Intake           3795.7 ml   Output             3452 ml   Net            343.7 ml       Lines/Drains/Airways     Central Venous Catheter Line                 Introducer 07/27/17 0731 2 days         Percutaneous Central Line Insertion/Assessment - quad lumen  07/27/17 0731 right internal jugular;other (see comments) 2 days         Pulmonary Artery Catheter Assessment  07/27/17 0731 2 days          Drain                 Chest Tube 07/27/17 1215 1 Right Mediastinal 32 Fr. 1 day         Chest Tube 07/27/17 1216 2 Left Mediastinal 32 Fr. 1 day         NG/OG Tube 07/28/17 1745 orogastric Center mouth less than 1 day         Urethral Catheter 07/28/17 1716 Latex less than 1 day          Airway                 Airway - Non-Surgical 07/27/17 0720 Endotracheal Tube 2 days          Arterial Line                 Arterial Line 07/27/17 0716 Left Other (Comment) 2 days          Line                  VAD 07/27/17 1312 Left ventricular assist device HeartMate 3 1 day          Peripheral Intravenous Line                 Peripheral IV - Single Lumen 07/26/17 1630 Left Antecubital 2 days         Peripheral IV - Single Lumen 07/26/17 2000 Left Wrist 2 days                Physical Exam   Gen: intubated  But arousable  Neck: no JVD, no carotid bruits  CV: VAD hum  Resp: intubated, breathing comfortably  GI: soft, nontender nondistended, normal bowel sounds  Ext: warm well perfused, no edema, no clubbing or cyanosis      Significant Labs:   CMP   Recent Labs  Lab 07/28/17 0357 07/28/17 2349 07/29/17  0400 07/29/17  0802   *  < > 136 135* 135*   K 3.8  < > 4.1 3.9 4.5     < > 104 104 105   CO2 21*  < > 19* 19* 20*   *  < > 137* 134* 140*   BUN 26*  < > 25* 24* 24*   CREATININE 1.3  < > 1.2 1.2 1.2   CALCIUM 9.0  < > 8.5* 8.4* 8.5*   PROT 6.3  --   --   --   --    ALBUMIN 4.1  --   --   --   --    BILITOT 2.6*  --   --   --   --    ALKPHOS 32*  --   --   --   --    AST 99*  --   --   --   --    ALT 18  --   --   --   --    ANIONGAP 11  < > 13 12 10   ESTGFRAFRICA >60.0  < > >60.0 >60.0 >60.0   EGFRNONAA 56.5*  < > >60.0 >60.0 >60.0   < > = values in this interval not displayed., CBC   Recent Labs  Lab 07/28/17  1958 07/28/17 2349 07/29/17  0400   WBC 13.73* 15.70* 16.33*   HGB 9.1* 8.7* 8.7*   HCT 26.6* 25.7* 25.7*   PLT 83* 82* 86*   , INR   Recent Labs  Lab 07/28/17 2349 07/29/17  0400 07/29/17  0802   INR 1.3* 1.4* 1.3*    and All pertinent lab results from the last 24 hours have been reviewed.    Significant Imaging: Echocardiogram:   2D echo with color flow doppler:   Results for orders placed or performed during the hospital encounter of 07/18/17   2D echo with color flow doppler   Result Value Ref Range    Est. PA Systolic Pressure 20.43      Native QRS shorter than Bi-V paced QRS    Assessment and Plan:     AICD (automatic cardioverter/defibrillator) present    Mr. Hayden is a 67  year old man with recent LVAD complicated by likely RV lead displacement vs damage who is currently recovering in ICU in NSR. EP consulted for possible RV lead revision    --will interrogate Medtronic ICD non-urgently  --please transition to coumadin as cannot perform lead revision on heparin products  --once INR therapeutic, will tentatively plan on RV lead revision vs replacement    Discussed with Dr. Willett            VTE Risk Mitigation         Ordered     warfarin (COUMADIN) tablet 2 mg  Once     Route:  Oral        07/29/17 0907     Low Risk of VTE  Once      07/27/17 1346          Thank you for your consult. I will follow-up with patient. Please contact us if you have any additional questions.    Shayne Fairchild MD  Cardiology   [facility]

## 2017-07-29 NOTE — PROGRESS NOTES
Progress Note  Surgical Intensive Care    Admit Date: 7/18/2017  Post-operative Day: 1 Day Post-Op  Hospital Day: 12    SUBJECTIVE:     Follow-up For:  Procedure(s) (LRB):  CLOSURE-STERNAL WOUND (N/A)  PLACEMENT-NEOPERICARDIUM (N/A)     Interval history: Patient had sternotomy closed yesterday in OR. No complications. Patient remains intubated and sedated. On nitric oxide. Currently on heparin, epi, lasix , propofol, and amiodarone gtt. Off the cardene gtt. R mediastinal tube drain 50cc and L mediastinal tubes 215cc in last 12 hours. Good UOP 1cc/kg/hr.     Continuous Infusions:   sodium chloride 0.9% 10 mL/hr at 07/27/17 1451    amiodarone 0.5 mg/min (07/29/17 0602)    dextrose 5 % and 0.9 % NaCl 10 mL/hr at 07/29/17 0602    DOBUTamine 4 mcg/kg/min (07/29/17 0602)    epinephrine infusion (NON-TITRATING) 0.07 mcg/kg/min (07/29/17 0602)    furosemide (LASIX) 1 mg/mL infusion (non-titrating) 20 mg/hr (07/29/17 0602)    heparin (porcine) in 5 % dex 200 Units/hr (07/29/17 0602)    insulin (HUMAN R) infusion (adults) Stopped (07/28/17 1900)    nicardipine Stopped (07/29/17 0400)    nitric oxide gas      propofol 20 mcg/kg/min (07/29/17 0722)    vasopressin (PITRESSIN) infusion Stopped (07/28/17 1100)     Scheduled Meds:   aspirin  325 mg Oral Daily    aspirin  325 mg Per NG tube Daily    ceFEPime (MAXIPIME) IVPB  1 g Intravenous Q8H    chlorothiazide (DIURIL) IVPB  500 mg Intravenous Once    docusate sodium  200 mg Oral QHS    ferrous gluconate  324 mg Oral Daily with breakfast    mupirocin   Nasal BID    pantoprazole  40 mg Oral Daily    pantoprazole  40 mg Intravenous Daily    pravastatin  20 mg Oral QHS    sodium chloride 0.9%  3 mL Intravenous Q8H     PRN Meds:sodium chloride, sodium chloride, albumin human 5%, albuterol sulfate, bisacodyl, dextrose 50%, dextrose 50%, fentaNYL, magnesium hydroxide 400 mg/5 ml, magnesium sulfate IVPB, oxycodone, oxycodone, potassium chloride, potassium chloride,  sodium phosphate IVPB, sodium phosphate IVPB, sodium phosphate IVPB    Review of patient's allergies indicates:  No Known Allergies    OBJECTIVE:     Vital Signs (Most Recent)  Temp: 99.2 °F (37.3 °C) (07/29/17 0700)  Pulse: 98 (07/29/17 0750)  Resp: 16 (07/29/17 0700)  BP: (!) 85/65 (07/29/17 0400)  SpO2: 100 % (07/29/17 0700)    Vital Signs Range (Last 24H):  Temp:  [96.4 °F (35.8 °C)-100.7 °F (38.2 °C)]   Pulse:  []   Resp:  [3-23]   BP: ()/(0-88)   SpO2:  [85 %-100 %]   Arterial Line BP: (74-97)/(59-72)     I & O (Last 24H):    Intake/Output Summary (Last 24 hours) at 07/29/17 0800  Last data filed at 07/29/17 0700   Gross per 24 hour   Intake           3795.7 ml   Output             3377 ml   Net            418.7 ml     Ventilator Data (Last 24H):     Vent Mode: SIMV  Oxygen Concentration (%):  [50] 50  Resp Rate Total:  [16 br/min] 16 br/min  Vt Set:  [500 mL] 500 mL  PEEP/CPAP:  [5 cmH20] 5 cmH20  Pressure Support:  [10 cmH20] 10 cmH20  Mean Airway Pressure:  [9.7 cmH20-10 cmH20] 9.9 cmH20    Hemodynamic Parameters (Last 24H):  PAP: (7-46)/(3-25) 39/21  PAP (Mean):  [5 mmHg-32 mmHg] 28 mmHg  CO:  [2.4 L/min-5.4 L/min] 4.3 L/min  CI:  [2.2 L/min/m2-2.8 L/min/m2] 2.2 L/min/m2    Physical Exam   Constitutional: He appears well-developed and well-nourished. No distress.   HENT:   Head: Normocephalic and atraumatic.   Eyes: Right eye exhibits no discharge. Left eye exhibits no discharge. No scleral icterus.   Neck: Normal range of motion. Neck supple.   Cardiovascular: Intact distal pulses.    LVAD hum present   Pulmonary/Chest: Effort normal. No respiratory distress. Intubated on SIMV mode.  R and L meds chest tubes in place- minimal serosanguinous drainage.  Bandage over sternotomy incision   Abdominal: Soft. He exhibits no distension.   Skin: Skin is warm and dry.     Laboratory (Last 24H):  CBC:    Recent Labs  Lab 07/29/17  0400   WBC 16.33*   HGB 8.7*   HCT 25.7*   PLT 86*     CMP:    Recent  Labs  Lab 07/29/17  0400   CALCIUM 8.4*   *   K 3.9   CO2 19*      BUN 24*   CREATININE 1.2       ASSESSMENT/PLAN:     Neuro:   -patient intubaeted  -propofol for sedation     Pulmonary:   -intubated  Vent Mode: SIMV  Oxygen Concentration (%):  [50] 50  Resp Rate Total:  [16 br/min] 16 br/min  Vt Set:  [500 mL] 500 mL  PEEP/CPAP:  [5 cmH20] 5 cmH20  Pressure Support:  [10 cmH20] 10 cmH20  Mean Airway Pressure:  [9.7 cmH20-10 cmH20] 9.9 cmH20  -Chest tubes - R med and 2 L meds      Cardiac:  -MAP range goal >65  -Dobutamine gtt of   -Epi of 0.07  -currently off cardene drip     Renal:   -Singer in place  -Bun/Cr stable 24/1.2  -UOP 1 cc/kg/hr in past 24 hrs      Fluids/Electrolytes/Nutrition/GI:   -Nutritional status: NPO  -NGT for meds  -replace lytes PRN     Hematology/Oncology:  -H/H stable at 8.7/25.7  -INR/Plts 1.2/102  -on heparin gtt  -Aspirin 325mg     Infectious Disease:   -Afebrile   -WBC trending up, now 16.33  -cefepime  -Cultures NGTD     Endocrine:  -endocrine following: plan to start low dose novolog correction  -bg goal 140-180  -off insulin gtt     Dispo:  -Continue care in the ICU setting    DARIELA Kumar, PGY1   General Surgery- SICU

## 2017-07-29 NOTE — SUBJECTIVE & OBJECTIVE
"Interval HPI:   Overnight events:  Off insulin gtt for 12hr and BS at goal    BP (!) 85/65   Pulse 99   Temp 99.2 °F (37.3 °C) (Core ([place]))   Resp 16   Ht 5' 8" (1.727 m)   Wt 84.2 kg (185 lb 10 oz)   SpO2 100%   BMI 28.22 kg/m²     Labs Reviewed and Include      Recent Labs  Lab 07/29/17  0400   *   CALCIUM 8.4*   *   K 3.9   CO2 19*      BUN 24*   CREATININE 1.2     Lab Results   Component Value Date    WBC 16.33 (H) 07/29/2017    HGB 8.7 (L) 07/29/2017    HCT 25.7 (L) 07/29/2017    MCV 83 07/29/2017    PLT 86 (L) 07/29/2017     No results for input(s): TSH, FREET4 in the last 168 hours.  Lab Results   Component Value Date    HGBA1C 5.4 07/06/2017       Nutritional status:   Body mass index is 28.22 kg/m².  Lab Results   Component Value Date    ALBUMIN 4.1 07/28/2017    ALBUMIN 3.0 (L) 07/27/2017    ALBUMIN 3.0 (L) 07/27/2017     Lab Results   Component Value Date    PREALBUMIN 13 (L) 07/28/2017    PREALBUMIN 18 (L) 07/06/2017       Estimated Creatinine Clearance: 63.1 mL/min (based on Cr of 1.2).    Accu-Checks  Recent Labs      07/28/17   1156  07/28/17   1404  07/28/17   1620  07/28/17   1757  07/28/17   2000  07/28/17   2209  07/29/17   0003  07/29/17   0303  07/29/17   0401  07/29/17   0612   POCTGLUCOSE  165*  120*  119*  137*  145*  135*  134*  130*  161*  139*       Current Medications and/or Treatments Impacting Glycemic Control  Immunotherapy:  Immunosuppressants     None        Steroids:   Hormones     Start     Stop Route Frequency Ordered    07/27/17 1400  vasopressin (PITRESSIN) 100 Units in dextrose 5 % 100 mL infusion      -- IV Continuous 07/27/17 1403        Pressors:    Autonomic Drugs     Start     Stop Route Frequency Ordered    07/27/17 1353  EPINEPHrine (ADRENALIN) 4 mg in sodium chloride 0.9% 250 mL infusion      -- IV Continuous 07/27/17 9024        Hyperglycemia/Diabetes Medications: Antihyperglycemics     Start     Stop Route Frequency Ordered    07/29/17 " 0910  insulin aspart pen 0-5 Units      -- SubQ Every 4 hours PRN 07/29/17 0810

## 2017-07-29 NOTE — HPI
Mr. Hayden is a 67 year old man with advanced cardiomyopathy s/p recent LVAD, VT with BiV ICD who is currently in ICU recovering from LVAD. Post operatively, found to have RV lead malfunction which likely occurred during the procedure. He is hemodynamically stable, in sinus rhythm and without acute complaint. EP consulted for possible lead replacement.

## 2017-07-29 NOTE — PT/OT/SLP EVAL
"Speech Language Pathology  Clinical Swallow Evaluation    Suman Hayden   MRN: [id number]   Admitting Diagnosis: Acute on chronic combined systolic and diastolic heart failure    Diet recommendations: Solid Diet Level: Dental Soft  Liquid Diet Level: Thin HOB to 90 degrees, Small bites/sips, Alternating bites/sips, 1 bite/sip at a time, Meds whole 1 at a time and Assistance with meals    SLP Treatment Date: 07/29/17  Speech Start Time: 1405     Speech Stop Time: 1424     Speech Total (min): 19 min       TREATMENT BILLABLE MINUTES:  Eval Swallow and Oral Function 19    Diagnosis: Acute on chronic combined systolic and diastolic heart failure  S/p LVAD 7/27  S/p extubation 7/29    Past Medical History:   Diagnosis Date    Cardiomyopathy     Hyperlipidemia     Hypertension     Obesity     S/P implantation of automatic cardioverter/defibrillator (AICD)     Ventricular tachycardia      Past Surgical History:   Procedure Laterality Date    CARDIAC CATHETERIZATION      CARDIAC DEFIBRILLATOR PLACEMENT         Has the patient been evaluated by SLP for swallowing? : Yes  Keep patient NPO?: No   General Precautions: Standard, aspiration, fall, sternal, [place] catheter (PA cordis), LVAD    Current Respiratory Status: nasal cannula     Prior diet: regular/soft solids and thin liquids    Subjective:  "It depends on what I'm eating." pt's response when asked if he wears his dentures when he's eating.    Pain/Comfort  Pain Rating 1: 0/10    Objective:   Patient found with: telemetry, delgado catheter, blood pressure cuff, central line, SCD, pulse ox (continuous), arterial line, chest tube, pressure relief boots, oxygen, peripheral IV    Oral Musculature Evaluation  Oral Musculature: WFL  Dentition: edentulous (dentures not present)  Mucosal Quality: adequate  Mandibular Strength and Mobility: WFL  Oral Labial Strength and Mobility: WFL  Lingual Strength and Mobility: WFL  Velar Elevation: WFL  Buccal Strength and Mobility: " WFL  Volitional Cough: strong  Volitional Swallow: elicited  Voice Prior to PO Intake: dry, clear     Bedside Swallow Eval:  Consistencies Assessed: Thin liquids ice chip, tsp , cup and straw sips (approx 4oz), Puree 1/2 tsp x 1, full tsp x 1 and Solids 1/4 cracker  Oral Phase: prolonged mastication  and slow oral transit time with need for sips of water to clear solid  Pharyngeal Phase: no overt clinical  signs/symptoms of aspiration    Additional Treatment:  Education provided to pt regarding swallow evaluation, concerns for aspiration, overt s/s of aspiration, diet recommendations, aspiration precautions, and ST POC. Pt verbalized understanding.  White board updated. Nurse notified of recs.       Assessment:  Suman Hayden is a 67 y.o. male with a medical diagnosis of Acute on chronic combined systolic and diastolic heart failure and presents with mild oral dysphagia associated with lack of dentition.        Discharge recommendations: Discharge Facility/Level Of Care Needs:  (TBD pending progress)     Goals:    SLP Goals        Problem: SLP Goal    Goal Priority Disciplines Outcome   SLP Goal     SLP    Description:  Speech Language Pathology Goals  Goals expected to be met by 8/4:  1. Pt will tolerate a dental soft diet and thin liquids without s/s of aspiration.                          Plan:   Patient to be seen Therapy Frequency: 5 x/week   Plan of Care expires: 08/27/17  Plan of Care reviewed with: patient  SLP Follow-up?: Yes         SLP G-Codes  Functional Assessment Tool Used: noms  Score: 5  Functional Limitations: Swallowing  Swallow Current Status (): CI  Swallow Goal Status (): CI    SHERRI Manrique CCC-SLP  07/29/2017    SHERRI Manrique CCC-SLP  Speech Language Pathologist  [phone]  7/29/2017

## 2017-07-29 NOTE — PROGRESS NOTES
[facility]  Heart Transplant  Progress Note    Patient Name: Suman Hayden  MRN: [id number]  Admission Date: 7/18/2017  Hospital Length of Stay: 10 days  Attending Physician: Sunny Downing MD  Primary Care Provider: Joe Ernst MD  Principal Problem:Heart failure    Subjective:     Interval History: s/p LVAD sx yesterday and went to the OR for chest closure today.    Continuous Infusions:   sodium chloride 0.9% 10 mL/hr at 07/27/17 1451    amiodarone 1 mg/min (07/28/17 1900)    amiodarone      dextrose 5 % and 0.45 % NaCl with KCl 40 mEq 10 mL/hr at 07/27/17 1800    DOBUTamine 4 mcg/kg/min (07/28/17 1900)    epinephrine infusion (NON-TITRATING) 0.07 mcg/kg/min (07/28/17 1132)    furosemide (LASIX) 1 mg/mL infusion (non-titrating) 20 mg/hr (07/28/17 1900)    insulin (HUMAN R) infusion (adults) Stopped (07/28/17 1900)    nicardipine 3.5 mg/hr (07/28/17 1900)    nitric oxide gas      propofol 20 mcg/kg/min (07/28/17 1929)    vasopressin (PITRESSIN) infusion Stopped (07/28/17 1100)     Scheduled Meds:   aspirin  325 mg Oral Daily    aspirin  325 mg Per NG tube Daily    ceFEPime (MAXIPIME) IVPB  1 g Intravenous Q8H    chlorothiazide (DIURIL) IVPB  500 mg Intravenous Once    docusate sodium  200 mg Oral QHS    ferrous gluconate  324 mg Oral Daily with breakfast    mupirocin   Nasal BID    pantoprazole  40 mg Oral Daily    pantoprazole  40 mg Intravenous Daily    pravastatin  20 mg Oral QHS    sodium chloride 0.9%  3 mL Intravenous Q8H     PRN Meds:sodium chloride, sodium chloride, albumin human 5%, albuterol sulfate, bisacodyl, dextrose 50%, dextrose 50%, fentaNYL, magnesium hydroxide 400 mg/5 ml, magnesium sulfate IVPB, oxycodone, oxycodone, potassium chloride, potassium chloride, sodium phosphate IVPB, sodium phosphate IVPB, sodium phosphate IVPB    Review of patient's allergies indicates:  No Known Allergies  Objective:     Vital Signs (Most Recent):  Temp: (!) 100.6 °F (38.1  °C) (07/28/17 1900)  Pulse: 80 (07/28/17 1915)  Resp: 16 (07/28/17 1901)  BP: (!) 170/88 (07/28/17 1700)  SpO2: 100 % (07/28/17 1900) Vital Signs (24h Range):  Temp:  [96.4 °F (35.8 °C)-100.6 °F (38.1 °C)] 100.6 °F (38.1 °C)  Pulse:  [] 80  Resp:  [3-23] 16  SpO2:  [84 %-100 %] 100 %  BP: ()/(0-88) 170/88  Arterial Line BP: (74-97)/(59-74) 89/71     Weight: 79.7 kg (175 lb 11.3 oz)  Body mass index is 26.72 kg/m².      Intake/Output Summary (Last 24 hours) at 07/28/17 1938  Last data filed at 07/28/17 1900   Gross per 24 hour   Intake             4130 ml   Output             2692 ml   Net             1438 ml       Hemodynamic Parameters:  PAP: (7-46)/(3-25) 44/21  PAP (Mean):  [5 mmHg-32 mmHg] 29 mmHg  CO:  [4.1 L/min-5.4 L/min] 4.4 L/min  CI:  [2.2 L/min/m2-3.1 L/min/m2] 2.2 L/min/m2    Telemetry: no events    Physical Exam    Significant Labs:  CBC:    Recent Labs  Lab 07/28/17  1513   WBC 13.92*   RBC 3.20*   HGB 9.1*   HCT 26.9*   *   MCV 84   MCH 28.4   MCHC 33.8     BNP:    Recent Labs  Lab 07/28/17  0357   BNP 1,184*     CMP:    Recent Labs  Lab 07/28/17 0357 07/28/17  1513   *  < > 128*   CALCIUM 9.0  < > 8.6*   ALBUMIN 4.1  --   --    PROT 6.3  --   --    *  < > 133*   K 3.8  < > 4.5   CO2 21*  < > 19*     < > 103   BUN 26*  < > 25*   CREATININE 1.3  < > 1.2   ALKPHOS 32*  --   --    ALT 18  --   --    AST 99*  --   --    BILITOT 2.6*  --   --    < > = values in this interval not displayed.   Coagulation:     Recent Labs  Lab 07/28/17  1513   INR 1.2   APTT 29.0     LDH:    Recent Labs  Lab 07/28/17  1155   *     Microbiology:  Microbiology Results (last 7 days)     Procedure Component Value Units Date/Time    Blood culture [[id number]] Collected:  07/24/17 1300    Order Status:  Completed Specimen:  Blood from Peripheral, Hand, Right Updated:  07/28/17 1812     Blood Culture, Routine No Growth to date     Blood Culture, Routine No Growth to date     Blood  Culture, Routine No Growth to date     Blood Culture, Routine No Growth to date     Blood Culture, Routine No Growth to date    Blood culture [[id number]] Collected:  07/24/17 1100    Order Status:  Completed Specimen:  Blood from Peripheral, Antecubital, Left Updated:  07/28/17 1412     Blood Culture, Routine No Growth to date     Blood Culture, Routine No Growth to date     Blood Culture, Routine No Growth to date     Blood Culture, Routine No Growth to date     Blood Culture, Routine No Growth to date    Urine culture [[id number]] Collected:  07/24/17 1139    Order Status:  Completed Specimen:  Urine from Urine, Clean Catch Updated:  07/26/17 0245     Urine Culture, Routine --     ENTEROCOCCUS SPECIES  >100,000 cfu/ml  Identification and susceptibility pending            I have reviewed all pertinent labs within the past 24 hours.    Estimated Creatinine Clearance: 57.8 mL/min (based on Cr of 1.2).    Diagnostic Results:  I have reviewed and interpreted all pertinent imaging results/findings within the past 24 hours.    Assessment and Plan:     Acute on chronic combined systolic and diastolic heart failure    - LVAD placement 7/27/17  - CTS Primary post-surgery   - will follow up tomorrow - patient in the OR          AICD discharge    - appropriate in the setting of VT aggravated by underlying AT/AFL        V-tach    - amiodarone 400 mg PO BID until 8/2/17 the 400 mg QD  - monitor lytes; keep K>4.0, Mg>2.0  - appreciate EP recs        Atrial tachycardia    - [id number] - 3  - c/w amiodarone  - c/w heparin gtt        INDIRA (acute kidney injury)    - pre-renal due to hypervolemia  - c/w lasix gtt  - improving  - avoid nephrotoxic agents        Hepatitis B core antibody positive since 2012    - will defer liver biopsy as CTS not requiring it  - ID consult cleared the patient for sx  - no evidence of liver failure  - TBILI trending down  - AST/ALT WNL  - case discussed with hepatology, low suspicion for liver  involvement        Urine culture positive    - patient asymptomatic  - UA - pyuria, bacteruria  - Ucx + enterococci. Sensitivities pending  - recalled ID        Hyperlipidemia    - c/w pravastatin            Lorena Payton MD  Heart Transplant  [facility]

## 2017-07-29 NOTE — SUBJECTIVE & OBJECTIVE
Interval History: S/p chest closure yesterday. Yesterday had an arrhythmia event read as VT s/p ICD shock. Device interrogated by EP which delivered an inappropriate shock in the setting of AF undersensing. It was recommended that RA/RV leads to be revised as RA lead no longer sensing, RV lead undersensing (2.8-3.1 mV down to 0.8 mV). Decision made to turn off tachy therapy and change device to VVI mode at 80 BPM from DDD at 80 BPM. Pads placed on the pateint in case of recurrent hemodynamically significant event.     The patient successfully extubated in the morning. Still has two mediastinal chest tubes in place. Being weaned off drips. CVP 11, SVO2 57%    The patient reports significant generalized body pain.    Continuous Infusions:   sodium chloride 0.9% 10 mL/hr at 07/27/17 1451    amiodarone 0.5 mg/min (07/29/17 1113)    dextrose 5 % and 0.9 % NaCl 10 mL/hr at 07/29/17 0602    DOBUTamine 4 mcg/kg/min (07/29/17 0900)    epinephrine infusion (NON-TITRATING) 0.06 mcg/kg/min (07/29/17 0900)    furosemide (LASIX) 1 mg/mL infusion (non-titrating) 20 mg/hr (07/29/17 1113)    heparin (porcine) in 5 % dex 200 Units/hr (07/29/17 0602)    nicardipine 2.5 mg/hr (07/29/17 0900)    nitric oxide gas      propofol Stopped (07/29/17 0800)    vasopressin (PITRESSIN) infusion Stopped (07/28/17 1100)     Scheduled Meds:   aspirin  325 mg Oral Daily    aspirin  325 mg Per NG tube Daily    ceFEPime (MAXIPIME) IVPB  1 g Intravenous Q8H    chlorothiazide (DIURIL) IVPB  500 mg Intravenous Once    docusate sodium  200 mg Oral QHS    ferrous gluconate  324 mg Oral Daily with breakfast    mupirocin   Nasal BID    pantoprazole  40 mg Oral Daily    pantoprazole  40 mg Intravenous Daily    pravastatin  20 mg Oral QHS    sodium chloride 0.9%  3 mL Intravenous Q8H    warfarin  2 mg Oral Once     PRN Meds:sodium chloride, sodium chloride, albumin human 5%, albuterol sulfate, bisacodyl, dextrose 50%, fentaNYL, glucagon  (human recombinant), insulin aspart, magnesium hydroxide 400 mg/5 ml, magnesium sulfate IVPB, oxycodone, oxycodone, potassium chloride, potassium chloride, sodium phosphate IVPB, sodium phosphate IVPB, sodium phosphate IVPB    Review of patient's allergies indicates:  No Known Allergies  Objective:     Vital Signs (Most Recent):  Temp: 100.1 °F (37.8 °C) (07/29/17 1100)  Pulse: 103 (07/29/17 1145)  Resp: (!) 27 (07/29/17 1145)  BP: (!) 85/65 (07/29/17 0400)  SpO2: 97 % (07/29/17 1130) Vital Signs (24h Range):  Temp:  [99.2 °F (37.3 °C)-100.7 °F (38.2 °C)] 100.1 °F (37.8 °C)  Pulse:  [] 103  Resp:  [16-30] 27  SpO2:  [73 %-100 %] 97 %  BP: ()/(0-88) 85/65  Arterial Line BP: (74-93)/(59-76) 84/68     Weight: 84.2 kg (185 lb 10 oz)  Body mass index is 28.22 kg/m².      Intake/Output Summary (Last 24 hours) at 07/29/17 1324  Last data filed at 07/29/17 1100   Gross per 24 hour   Intake           3795.7 ml   Output             2992 ml   Net            803.7 ml       Hemodynamic Parameters:  PAP: (7-49)/(3-25) 46/19  PAP (Mean):  [5 mmHg-32 mmHg] 29 mmHg  CO:  [2.4 L/min-5.7 L/min] 5.7 L/min  CI:  [2.2 L/min/m2-2.9 L/min/m2] 2.9 L/min/m2      Physical Exam   Constitutional: NAD, conversant  HEENT: Sclera anicteric, PERRLA, EOMI  Neck: Positive JVD, no carotid bruits  CV: RRR, no murmur, normal S1/S2, +S3, No Pericardial rub  Pulm: CTAB with no wheezes, rales, or rhonchi, two chest tubes in place  GI: Abdomen soft, NTND, +BS  Extremities: No LE edema, warm and well perfused, No cyanosis, No clubbing  Skin: No ecchymosis, erythema, or ulcers  Psych: AOx3, appropriate affect  Neuro: CNII-XII intact, no focal de    Significant Labs:  CBC:    Recent Labs  Lab 07/29/17  1159   WBC 16.44*   RBC 3.13*   HGB 9.1*   HCT 26.2*   PLT 97*   MCV 84   MCH 29.1   MCHC 34.7     BNP:    Recent Labs  Lab 07/28/17  0357   BNP 1,184*     CMP:    Recent Labs  Lab 07/28/17  0357  07/29/17  1159   *  < > 134*   CALCIUM 9.0  <  > 8.8   ALBUMIN 4.1  --   --    PROT 6.3  --   --    *  < > 135*   K 3.8  < > 4.1   CO2 21*  < > 19*     < > 104   BUN 26*  < > 25*   CREATININE 1.3  < > 1.2   ALKPHOS 32*  --   --    ALT 18  --   --    AST 99*  --   --    BILITOT 2.6*  --   --    < > = values in this interval not displayed.   Coagulation:     Recent Labs  Lab 07/29/17  1159   INR 1.4*   APTT 36.7*     LDH:    Recent Labs  Lab 07/28/17  1155 07/29/17  0400   * 429*     Microbiology:  Microbiology Results (last 7 days)     Procedure Component Value Units Date/Time    Blood culture [[id number]] Collected:  07/24/17 1300    Order Status:  Completed Specimen:  Blood from Peripheral, Hand, Right Updated:  07/28/17 1812     Blood Culture, Routine No Growth to date     Blood Culture, Routine No Growth to date     Blood Culture, Routine No Growth to date     Blood Culture, Routine No Growth to date     Blood Culture, Routine No Growth to date    Blood culture [[id number]] Collected:  07/24/17 1100    Order Status:  Completed Specimen:  Blood from Peripheral, Antecubital, Left Updated:  07/28/17 1412     Blood Culture, Routine No Growth to date     Blood Culture, Routine No Growth to date     Blood Culture, Routine No Growth to date     Blood Culture, Routine No Growth to date     Blood Culture, Routine No Growth to date    Urine culture [[id number]] Collected:  07/24/17 1139    Order Status:  Completed Specimen:  Urine from Urine, Clean Catch Updated:  07/26/17 0245     Urine Culture, Routine --     ENTEROCOCCUS SPECIES  >100,000 cfu/ml  Identification and susceptibility pending            I have reviewed all pertinent labs within the past 24 hours.    Estimated Creatinine Clearance: 63.1 mL/min (based on Cr of 1.2).    Diagnostic Results:  I have reviewed all pertinent imaging results/findings within the past 24 hours.

## 2017-07-29 NOTE — PT/OT/SLP RE-EVAL
"Occupational Therapy  Re-evaluation    Suman Hayden   MRN: [id number]   Admitting Diagnosis: Acute on chronic combined systolic and diastolic heart failure    OT Date of Treatment: 07/29/17   OT Start Time: 1300  OT Stop Time: 1340  OT Total Time (min): 40 min    Billable Minutes:  Re-eval 15  Therapeutic Activity 15    Diagnosis: Acute on chronic combined systolic and diastolic heart failure   Pt is s/p HM II LVAD placed 7/27/17 with chest closed 7/28/17. Pt was extubated this morning    Past Medical History:   Diagnosis Date    Cardiomyopathy     Hyperlipidemia     Hypertension     Obesity     S/P implantation of automatic cardioverter/defibrillator (AICD)     Ventricular tachycardia       Past Surgical History:   Procedure Laterality Date    CARDIAC CATHETERIZATION      CARDIAC DEFIBRILLATOR PLACEMENT       General Precautions: Standard, LVAD, fall, sternal  Orthopedic Precautions: N/A      Patient History:  Living Environment  Lives With: spouse  Living Arrangements: house  Transportation Available: family or friend will provide  Living Environment Comment: Pt lives with spouse in a SS home, No steps to enter, No DME, Tub/ shower combo. independent with ADL and mobility prior to admission   Equipment Currently Used at Home: none    Prior level of function:   Bed Mobility/Transfers: independent  Grooming: independent  Bathing: independent  Upper Body Dressing: independent  Lower Body Dressing: independent  Toileting: independent  Home Management Skills: independent  Driving License: Yes  Mode of Transportation: Car  Occupation: Retired       Subjective:  Communicated with nsg prior to session.  "I would like that" pt states in response to sitting EOB.     Pain/Comfort  Pain Rating 1: 10/10  Location - Orientation 1: midline  Location 1:  (incisional chest pain)  Pain Addressed 1: Reposition, Distraction, Nurse notified    Objective:   Pt found supine in bed. Pt with Nitric oxide in place and [place] Carmen in " "place. LVAD to [employer] power.     Cognitive Exam:  Pt awake, alert and oriented. Pt following commands and demo pleasant mood/affect.     Physical Exam:  Pt demo WFL UE strength/ROM within sternal precautions, intact light touch sensation and intact coordiantion.     Functional Mobility:  Bed Mobility:  Supine to Sit: Maximum Assistance  Sit to Supine: Maximum Assistance    Transfers:  Sit <> Stand Assistance: Total Assistance (MIN  A  X 2)  Sit <> Stand Assistive Device: No Assistive Device      Activities of Daily Living:       UE Dressing Level of Assistance: Minimum assistance  LE Dressing Level of Assistance: Total assistance  Grooming Position: Seated   Grooming Level of Assistance: Supervision        OT provided education to pt re: LVAD parts yasmin't including drive line and controller. OT provided education to pt re: sternal precautions and implications of functional activity. Pt verb and demo good understanding.   Pt tolerated sitting EOB x 10 min with Fair to Fair+ sitting balance. Pt completed 2 standing trials with MIN A x 2 and took 2 side steps with max cues for technique and safety. Pt with difficulty with weight shifting and clearing LE's.  OT monitored vital signs throughout all activity which remained stable.       AM-PAC 6 CLICK ADL  How much help from another person does this patient currently need?  1 = Unable, Total/Dependent Assistance  2 = A lot, Maximum/Moderate Assistance  3 = A little, Minimum/Contact Guard/Supervision  4 = None, Modified [place]/Independent    Putting on and taking off regular lower body clothing? : 1  Bathing (including washing, rinsing, drying)?: 1  Toileting, which includes using toilet, bedpan, or urinal? : 1  Putting on and taking off regular upper body clothing?: 3  Taking care of personal grooming such as brushing teeth?: 3  Eating meals?: 1  Total Score: 10    AM-PAC Raw Score CMS "G-Code Modifier Level of Impairment Assistance   6 % Total / Unable   7 - 9 " CM 80 - 100% Maximal Assist   10 - 14 CL 60 - 80% Moderate Assist   15 - 19 CK 40 - 60% Moderate Assist   20 - 22 CJ 20 - 40% Minimal Assist   23 CI 1-20% SBA / CGA   24 CH 0% Independent/ Mod I       Patient left supine with all lines intact, call button in reach and nsg present    Assessment:  Suman Hayden is a 67 y.o. male with a medical diagnosis of Acute on chronic combined systolic and diastolic heart failure and s/p LVAD placement. Pt tolerated session well with good effort and performance. Pt to benefit from cont OT to address stated goals. OT anticipates pt will be safe for d/c home with fly to assist as needed. .    Rehab identified problem list/impairments: Rehab identified problem list/impairments: weakness, impaired functional mobilty, gait instability, impaired endurance, impaired balance, impaired self care skills, pain    Rehab potential is good.    Activity tolerance: Good    Discharge recommendations: Discharge Facility/Level Of Care Needs: home with home health       GOALS:    Occupational Therapy Goals        Problem: Occupational Therapy Goal    Goal Priority Disciplines Outcome Interventions   Occupational Therapy Goal     OT, PT/OT     Description:  Goals to be met by:  2 weeks 8/12/17     Patient will increase functional independence with ADLs by performing:  Feeding: Independent   UE Dressing with Supervision.  LE Dressing with Supervision.  Grooming while standing with Supervision.  Toileting from toilet with Supervision for hygiene and clothing management.   Stand pivot transfers with Supervision.  Toilet transfer to toilet with Supervision.  Pt will be independent with LVAD yasmin't.                 Multidisciplinary Problems (Resolved)        Problem: Occupational Therapy Goal    Goal Priority Disciplines Outcome Interventions   Occupational Therapy Goal   (Resolved)     OT, PT/OT  Error    Description:  Goals to be met by: 8/04/2017    Patient will increase functional  independence with ADLs by performing:    Increased functional strength to 5/5 for improved ADL performance.  Upper extremity exercise program and R LE ankle pumps  3x 10 reps per handout, with independence.                      PLAN:  Patient to be seen 6 x/week to address the above listed problems via self-care/home management, therapeutic activities, therapeutic exercises  Plan of Care expires: 08/04/17  Plan of Care reviewed with: patient         DELMY Navarro  07/29/2017

## 2017-07-29 NOTE — PT/OT/SLP EVAL
Physical Therapy  Evaluation    Suman Hayden   MRN: [id number]   Admitting Diagnosis: Acute on chronic combined systolic and diastolic heart failure    PT Received On: 07/29/17  PT Start Time: 1304     PT Stop Time: 1341    PT Total Time (min): 37 min       Billable Minutes:  Evaluation 21 and Therapeutic Activity 8 (co-tx with OT)    Diagnosis: Acute on chronic combined systolic and diastolic heart failure   LVAD inserted 7/27, chest closed 7/28, extubated 7/29    Past Medical History:   Diagnosis Date    Cardiomyopathy     Hyperlipidemia     Hypertension     Obesity     S/P implantation of automatic cardioverter/defibrillator (AICD)     Ventricular tachycardia       Past Surgical History:   Procedure Laterality Date    CARDIAC CATHETERIZATION      CARDIAC DEFIBRILLATOR PLACEMENT         Referring physician: Shayne Espinoza MD  Date referred to PT: 7/27/17    General Precautions: Standard, LVAD, fall, sternal, Macon Carmen catheter (PA cordis)  Orthopedic Precautions: N/A   Braces: N/A       Do you have any cultural, spiritual, Gnosticist conflicts, given your current situation?: none noted     Patient History:  Lives With: spouse  Living Arrangements: house  Transportation Available: family or friend will provide  Living Environment Comment: Pt lives with his wife in a 1SH with 0 BYRON. PTA pt was (I) with ambulation and ADLs without use of DME.   Equipment Currently Used at Home: none  DME owned (not currently used): none    Previous Level of Function:  Ambulation Skills: independent  Transfer Skills: independent  ADL Skills: independent    Subjective:  Communicated with RN prior to session.  Pt agreeable to therapy. Pt with polite and positive demeanor throughout session.   Chief Complaint: pain at sternal incision site   Patient goals: get better and return home     Pain/Comfort  Pain Rating 1: 10/10  Location - Orientation 1: midline  Location 1: sternal (at incision )  Pain Addressed 1: Reposition,  Distraction, Nurse notified      Objective:   Patient found with: telemetry, delgado catheter, blood pressure cuff, central line, SCD, pulse ox (continuous), arterial line, chest tube, pressure relief boots, oxygen, peripheral IV (LVAD to wall power; nitric oxide, swan gerda)     Cognitive Exam:  Oriented to: Person, Place, Time and Situation    Follows Commands/attention: Follows two-step commands  Communication: clear/fluent  Safety awareness/insight to disability: intact    Physical Exam:  Postural examination/scapula alignment: Rounded shoulder    Skin integrity: Visible skin intact  Edema: None noted     Sensation:   Intact    Lower Extremity Range of Motion:  Right Lower Extremity: WFL  Left Lower Extremity: WFL    Lower Extremity Strength:  Right Lower Extremity: WFL  Left Lower Extremity: WFL    Functional Mobility:  Bed Mobility:  Supine to Sit: Maximum Assistance  Sit to Supine: Maximum Assistance    Transfers:  Sit <> Stand Assistance: Minimum Assistance (with assist of 2; x2 reps)  Sit <> Stand Assistive Device: No Assistive Device    Gait:   Gait Distance: 4 R lateral steps at EOB  Assistance 1: Maximum assistance, With assist of two  Gait Assistive Device: Hand held assist  Gait Pattern: 2-point gait  Gait Deviation(s): decreased hal, decreased toe-to-floor clearance, increased time in double stance, decreased velocity of limb motion, decreased step length, decreased weight-shifting ability    Increased difficulty weight-shifting and clearing BLE during swing; Max v/c and t/c for sequencing     Balance:   Static Sit: CGA-Pio  Dynamic Sit: min-modA  Static Stand: minAx2  Dynamic stand: maxAx2    Therapeutic Activities and Exercises:  Pt educated on role of PT and PT POC. Pt verbalized understanding.   Pt educated on sternal precautions. Pt able to maintain throughout mobility with mod cueing.   Sitting EOB x~10 minutes with CGA-Pio. Sit<>stand x2 reps with minAx2 and lateral steps at EOB with maxAx2.    Vitals signs monitored and stable throughout     AM-PAC 6 CLICK MOBILITY  How much help from another person does this patient currently need?   1 = Unable, Total/Dependent Assistance  2 = A lot, Maximum/Moderate Assistance  3 = A little, Minimum/Contact Guard/Supervision  4 = None, Modified Dane/Independent    Turning over in bed (including adjusting bedclothes, sheets and blankets)?: 2  Sitting down on and standing up from a chair with arms (e.g., wheelchair, bedside commode, etc.): 2  Moving from lying on back to sitting on the side of the bed?: 2  Moving to and from a bed to a chair (including a wheelchair)?: 2  Need to walk in hospital room?: 2  Climbing 3-5 steps with a railing?: 1  Total Score: 11     AM-PAC Raw Score CMS G-Code Modifier Level of Impairment Assistance   6 % Total / Unable   7 - 9 CM 80 - 100% Maximal Assist   10 - 14 CL 60 - 80% Moderate Assist   15 - 19 CK 40 - 60% Moderate Assist   20 - 22 CJ 20 - 40% Minimal Assist   23 CI 1-20% SBA / CGA   24 CH 0% Independent/ Mod I     Patient left supine with all lines intact, call button in reach and RN present.    Assessment:   Suman Hayden is a 67 y.o. male with a medical diagnosis of Acute on chronic combined systolic and diastolic heart failure and presents s/p LVAD insertion 7/27. Pt tolerated therapy session with good effort and positive affect throughout. Pt required assist of 2 to complete functional mobility 2* generalized weakness and deconditioning from hospital admission. Pt would continue to benefit from skilled acute PT in order to address current deficits and progress functional mobility. Anticipate that pt will be able to return home with HH and family assist when medically appropriate.     Rehab identified problem list/impairments: Rehab identified problem list/impairments: weakness, impaired functional mobilty, gait instability, impaired endurance, decreased safety awareness, pain, impaired cardiopulmonary response to  activity, impaired balance, impaired self care skills    Rehab potential is good.    Activity tolerance: Good    Discharge recommendations: Discharge Facility/Level Of Care Needs: home with home health     Barriers to discharge: Barriers to Discharge: None    Equipment recommendations: Equipment Needed After Discharge:  (TBD)     GOALS:    Physical Therapy Goals        Problem: Physical Therapy Goal    Goal Priority Disciplines Outcome Goal Variances Interventions   Physical Therapy Goal     PT/OT, PT Ongoing (interventions implemented as appropriate)     Description:  Goals to be met by: 17     Patient will increase functional independence with mobility by performin. Supine to sit with MInimal Assistance  2. Sit to supine with MInimal Assistance  3. Sit to stand transfer with Minimal Assistance  4. Bed to chair transfer with Minimal Assistance  5. Gait  x 50 feet with Minimal Assistance.   6. Lower extremity exercise program x15 reps, with supervision, in order to increase LE strength and (I) with functional mobility.                       PLAN:    Patient to be seen 6 x/week to address the above listed problems via gait training, therapeutic activities, therapeutic exercises  Plan of Care expires: 17  Plan of Care reviewed with: patient        Kely Brennon, PT, DPT   2017  [phone]

## 2017-07-29 NOTE — ASSESSMENT & PLAN NOTE
- will defer liver biopsy as CTS not requiring it  - ID consult cleared the patient for sx  - case discussed with hepatology, low suspicion for liver involvement

## 2017-07-29 NOTE — OP NOTE
DATE OF PROCEDURE:  07/27/2017    PREOPERATIVE DIAGNOSES:  1.  Nonischemic cardiomyopathy, NYHA class IV, INTERMACS 2.  2.  Acute-on-chronic combined systolic and diastolic heart failure.  3.  Ventricular tachyarrhythmia.  4.  Status post intraaortic balloon pump.  5.  Hyperlipidemia.  6.  Atrial tachycardia.  7.  Acute kidney injury.  8.  Severe biventricular failure.    POSTOPERATIVE DIAGNOSES:  1.  Nonischemic cardiomyopathy, NYHA class IV, INTERMACS 2.  2.  Acute-on-chronic combined systolic and diastolic heart failure.  3.  Ventricular tachyarrhythmia.  4.  Status post intraaortic balloon pump.  5.  Hyperlipidemia.  6.  Atrial tachycardia.  7.  Acute kidney injury.  8.  Severe biventricular failure.    OPERATIONS:  1.  Implantation of intracorporeal left ventricular assist device - HeartMate   III, under the CAP extension protocol.  2.  Temporary closure of the chest.  3.  Removal of the intraaortic balloon pump from the left groin secondary to   malfunction.    STAFF SURGEON:  Sunny Downing M.D.     FIRST ASSISTANT:  Travon Cain.    ANESTHESIA:  GETA.    ESTIMATED BLOOD LOSS:  200 mL.    KEY FINDINGS OF THE OPERATION:  1.  Severe biventricular failure.  2.  At 4900 RPMs, the septum appeared to be midline with 2 inotropic support and   nitric oxide and the chest left open.  3.  Due to diffuse coagulopathy and poor RV function, chest left open.    INDICATION OF OPERATION:  This is a 67-year-old gentleman who was evaluated for   heart failure and was found to be a high-risk case for mechanical circulatory   support.  The patient was worked up and presented in the multidisciplinary   selection meeting and was found to be not a candidate for heart transplantation   at this stage.  The patient had elevated bilirubins and multiple other   comorbidities and a decision was made to take the patient to the operating room   for implantation of left ventricular assist device understanding he was an   extremely  high-risk for biventricular failure.    Risks and benefits were discussed.  The patient understood the risks and   benefits and agreed and consented to the procedure.    PROCEDURE IN OPERATION:  The patient was brought to the operating room and   placed in a supine position.  After induction of anesthesia, the area was   prepped and draped in the usual sterile fashion.  An upper midline incision was   made, which was carried all the way down to the sternum and a median sternotomy   was then performed.  A chest retractor was placed.  Sternal edges were   cauterized.  The pericardium was opened up.  Left hemidiaphragm was taken down.    A small preperitoneal space was created for the pump pocket.  Once that was   done, systemic heparinization was used to obtain ACT greater than 450.  Once   that was obtained, cannulation stitches were placed.  Arterial cannula was   placed in the ascending aorta.  Bicaval venous cannulation was carried out.    This was because that there was a large clotted mass in the right atrium, which   precluded us from having good drainage, so a 20-Malawian SVC cannula was placed in   the SVC.  Bicaval cannulation was performed.  The patient was placed on full   cardiopulmonary bypass.  CO2 was used to flood the operative field to decrease   the chances of air embolism.  Once that was obtained, heart was brought to the   field by placing multiple lap sponges behind the heart.  LV apex was cored out   and submitted for pathology and no clot was noted.  No trabeculae were noted   that needed to be trimmed.    After that, multiple 2-0 Ethibond pledgeted stitches were placed   circumferentially.  Once those were done, the needles were passed off the sewing   ring and cut and passed off the field.  Sutures were tied down and cut.  LVAD   was then brought to the field and using a tunneling device, the LVAD driveline   was brought out through the driveline exit site at the level of the umbilicus in   the  right midclavicular line.  The LVAD was then attached to the sewing ring   with the locking mechanism.  Once that was done, the lap sponges behind the   heart were removed and passed off the field.  Outflow graft was brought to the   field and was measured to appropriate length and was beveled at 45 degrees angle   and cut.  De-airing of the LVAD was then started actively connecting it to the   heart-lung machine.  Once that was done, side-biting clamp was placed on the   ascending aorta.  An aortotomy was made and using a 5-0 Prolene stitch, a   continuous running anastomosis of the outflow graft to the ascending aorta was   performed.  Once that was done, the clamp on the outflow graft was removed.    De-airing was done.  Good hemostasis was ensured.  Wet-to-wet connection of the   outflow graft to the outflow portion of the LVAD was carried out.  Root vent was   placed in the ascending aorta for de-airing purposes.  Clamps on the outflow   graft were placed.  LVAD was then connected to the console and started at 3000   RPMs and the de-airing hole in the outflow graft was carried out.  Once we were   satisfied with de-airing by visualizing and GLENN, full ventilation was resumed.    The de-airing hole on the outflow graft was then sutured with a 5-0 Prolene   pledgeted stitch.  Once that was achieved, the patient was gradually weaned off   from cardiopulmonary bypass.  The patient was  from cardiopulmonary   bypass to an high root vent pass.  Once that was achieved and no intracardiac   air noted on GLENN examination, the clamp on the outflow graft was then removed.    The patient was transitioned from the cardiopulmonary bypass to the LVAD.  With   the volume loading, the speed of the LVAD was gradually increased to reach 4900   RPMs at which the septum appeared to be midline with excellent hemodynamics at   that time.  Epinephrine was being used at 0.1.  Dobutamine was being used at 5   and vasopressin at  0.04.  Due to diffuse coagulopathy and RV dysfunction, we   decided to leave the chest open.  Two drainage tubes were placed in the   mediastinum and brought through separate skin incision and connected to   Pleur-evac.  Once that was achieved, test dose of protamine was given followed   by full dose of protamine to reverse the effects of systemic heparin.  Midway   dose, all the various catheters and cannulas were removed.  Temporary closure of   the sternum was obtained by using an Esmarch and Ioban to obtain an airtight   seal.  The terminal count of needles, sponges, and instrument was found to be   correct.  The driveline exit site was then sutured circumferentially.  Once that   was done, the patient was taken back to the Intensive Care Unit in stable   condition.    MEDICARE ATTESTATION:  Due to unavailability of an adequately trained   cardiothoracic resident, I performed all parts of the operation myself.      DAVIDSON  dd: 07/28/2017 13:29:58 (CDT)  td: 07/28/2017 14:45:13 (CDT)  Doc ID   #[id number]  Job ID #[id number]    CC:

## 2017-07-29 NOTE — PROGRESS NOTES
[facility]  Heart Transplant  Progress Note    Patient Name: Suman Hayden  MRN: [id number]  Admission Date: 7/18/2017  Hospital Length of Stay: 11 days  Attending Physician: Sunny Downing MD  Primary Care Provider: Jeo Ernst MD  Principal Problem:Acute on chronic combined systolic and diastolic heart failure    Subjective:     Interval History: S/p chest closure yesterday. Yesterday had an arrhythmia event read as VT s/p ICD shock. Device interrogated by EP which delivered an inappropriate shock in the setting of AF undersensing. It was recommended that RA/RV leads to be revised as RA lead no longer sensing, RV lead undersensing (2.8-3.1 mV down to 0.8 mV). Decision made to turn off tachy therapy and change device to VVI mode at 80 BPM from DDD at 80 BPM. Pads placed on the pateint in case of recurrent hemodynamically significant event.     The patient successfully extubated in the morning. Still has two mediastinal chest tubes in place. Being weaned off drips. CVP 11, SVO2 57%    The patient reports significant generalized body pain.    Continuous Infusions:   sodium chloride 0.9% 10 mL/hr at 07/27/17 1451    amiodarone 0.5 mg/min (07/29/17 1113)    dextrose 5 % and 0.9 % NaCl 10 mL/hr at 07/29/17 0602    DOBUTamine 4 mcg/kg/min (07/29/17 0900)    epinephrine infusion (NON-TITRATING) 0.06 mcg/kg/min (07/29/17 0900)    furosemide (LASIX) 1 mg/mL infusion (non-titrating) 20 mg/hr (07/29/17 1113)    heparin (porcine) in 5 % dex 200 Units/hr (07/29/17 0602)    nicardipine 2.5 mg/hr (07/29/17 0900)    nitric oxide gas      propofol Stopped (07/29/17 0800)    vasopressin (PITRESSIN) infusion Stopped (07/28/17 1100)     Scheduled Meds:   aspirin  325 mg Oral Daily    aspirin  325 mg Per NG tube Daily    ceFEPime (MAXIPIME) IVPB  1 g Intravenous Q8H    chlorothiazide (DIURIL) IVPB  500 mg Intravenous Once    docusate sodium  200 mg Oral QHS    ferrous gluconate  324 mg Oral Daily  with breakfast    mupirocin   Nasal BID    pantoprazole  40 mg Oral Daily    pantoprazole  40 mg Intravenous Daily    pravastatin  20 mg Oral QHS    sodium chloride 0.9%  3 mL Intravenous Q8H    warfarin  2 mg Oral Once     PRN Meds:sodium chloride, sodium chloride, albumin human 5%, albuterol sulfate, bisacodyl, dextrose 50%, fentaNYL, glucagon (human recombinant), insulin aspart, magnesium hydroxide 400 mg/5 ml, magnesium sulfate IVPB, oxycodone, oxycodone, potassium chloride, potassium chloride, sodium phosphate IVPB, sodium phosphate IVPB, sodium phosphate IVPB    Review of patient's allergies indicates:  No Known Allergies  Objective:     Vital Signs (Most Recent):  Temp: 100.1 °F (37.8 °C) (07/29/17 1100)  Pulse: 103 (07/29/17 1145)  Resp: (!) 27 (07/29/17 1145)  BP: (!) 85/65 (07/29/17 0400)  SpO2: 97 % (07/29/17 1130) Vital Signs (24h Range):  Temp:  [99.2 °F (37.3 °C)-100.7 °F (38.2 °C)] 100.1 °F (37.8 °C)  Pulse:  [] 103  Resp:  [16-30] 27  SpO2:  [73 %-100 %] 97 %  BP: ()/(0-88) 85/65  Arterial Line BP: (74-93)/(59-76) 84/68     Weight: 84.2 kg (185 lb 10 oz)  Body mass index is 28.22 kg/m².      Intake/Output Summary (Last 24 hours) at 07/29/17 1324  Last data filed at 07/29/17 1100   Gross per 24 hour   Intake           3795.7 ml   Output             2992 ml   Net            803.7 ml       Hemodynamic Parameters:  PAP: (7-49)/(3-25) 46/19  PAP (Mean):  [5 mmHg-32 mmHg] 29 mmHg  CO:  [2.4 L/min-5.7 L/min] 5.7 L/min  CI:  [2.2 L/min/m2-2.9 L/min/m2] 2.9 L/min/m2      Physical Exam   Constitutional: NAD, conversant  HEENT: Sclera anicteric, PERRLA, EOMI  Neck: Positive JVD, no carotid bruits  CV: RRR, no murmur, normal S1/S2, +S3, No Pericardial rub  Pulm: CTAB with no wheezes, rales, or rhonchi, two chest tubes in place  GI: Abdomen soft, NTND, +BS  Extremities: No LE edema, warm and well perfused, No cyanosis, No clubbing  Skin: No ecchymosis, erythema, or ulcers  Psych: AOx3,  appropriate affect  Neuro: CNII-XII intact, no focal de    Significant Labs:  CBC:    Recent Labs  Lab 07/29/17  1159   WBC 16.44*   RBC 3.13*   HGB 9.1*   HCT 26.2*   PLT 97*   MCV 84   MCH 29.1   MCHC 34.7     BNP:    Recent Labs  Lab 07/28/17  0357   BNP 1,184*     CMP:    Recent Labs  Lab 07/28/17  0357  07/29/17  1159   *  < > 134*   CALCIUM 9.0  < > 8.8   ALBUMIN 4.1  --   --    PROT 6.3  --   --    *  < > 135*   K 3.8  < > 4.1   CO2 21*  < > 19*     < > 104   BUN 26*  < > 25*   CREATININE 1.3  < > 1.2   ALKPHOS 32*  --   --    ALT 18  --   --    AST 99*  --   --    BILITOT 2.6*  --   --    < > = values in this interval not displayed.   Coagulation:     Recent Labs  Lab 07/29/17  1159   INR 1.4*   APTT 36.7*     LDH:    Recent Labs  Lab 07/28/17  1155 07/29/17  0400   * 429*     Microbiology:  Microbiology Results (last 7 days)     Procedure Component Value Units Date/Time    Blood culture [[id number]] Collected:  07/24/17 1300    Order Status:  Completed Specimen:  Blood from Peripheral, Hand, Right Updated:  07/28/17 1812     Blood Culture, Routine No Growth to date     Blood Culture, Routine No Growth to date     Blood Culture, Routine No Growth to date     Blood Culture, Routine No Growth to date     Blood Culture, Routine No Growth to date    Blood culture [[id number]] Collected:  07/24/17 1100    Order Status:  Completed Specimen:  Blood from Peripheral, Antecubital, Left Updated:  07/28/17 1412     Blood Culture, Routine No Growth to date     Blood Culture, Routine No Growth to date     Blood Culture, Routine No Growth to date     Blood Culture, Routine No Growth to date     Blood Culture, Routine No Growth to date    Urine culture [[id number]] Collected:  07/24/17 1139    Order Status:  Completed Specimen:  Urine from Urine, Clean Catch Updated:  07/26/17 0245     Urine Culture, Routine --     ENTEROCOCCUS SPECIES  >100,000 cfu/ml  Identification and susceptibility pending             I have reviewed all pertinent labs within the past 24 hours.    Estimated Creatinine Clearance: 63.1 mL/min (based on Cr of 1.2).    Diagnostic Results:  I have reviewed all pertinent imaging results/findings within the past 24 hours.    Assessment and Plan:     LVAD (left ventricular assist device) present    - LVAD HM III  - Speed 5100  - no events  - LDH stable  - INR 1.4  - on heparin gtt        * Acute on chronic combined systolic and diastolic heart failure    - CTS Primary  - s/p LVAD HM III (7/27/17)  - chest closure (7/28/17)  - extubated (7/29/17)  - CVP 11, SVO2 57%  - on  4, epi 0.06, lasix 20, cardene 1 gtts  - NO 10 PPM  - off vasopressin gtt            AICD discharge    - appropriate in the setting of VT aggravated by underlying AT/AFL (earlier during the admission)  - 7/28 - setting of AF undersense - device reprogrammed to VVI 80  - tachy therapy turned off  - on amio gtt        V-tach    - s/p amiodarone reload - now on amio gtt        Atrial tachycardia    - [id number] - 3  - c/w amiodarone  - c/w heparin gtt        INDIRA (acute kidney injury)    - pre-renal due to hypervolemia  - c/w lasix gtt  - improving  - avoid nephrotoxic agents        Hepatitis B core antibody positive since 2012    - will defer liver biopsy as CTS not requiring it  - ID consult cleared the patient for sx  - case discussed with hepatology, low suspicion for liver involvement        Atrial fibrillation    - c/w heparin gtt  - c/w amio gtt        Urine culture positive    - patient asymptomatic  - UA - pyuria, bacteruria  - Ucx + enterococci. Sensitivities pending  - recalled ID        Hyperlipidemia    - c/w pravastatin            Lorena Payton MD  Heart Transplant  [facility]

## 2017-07-29 NOTE — ASSESSMENT & PLAN NOTE
- appropriate in the setting of VT aggravated by underlying AT/AFL (earlier during the admission)  - 7/28 - setting of AF undersense - device reprogrammed to VVI 80  - tachy therapy turned off  - on amio gtt

## 2017-07-29 NOTE — PROGRESS NOTES
Dr. Downing aware UOP 10mL despite lasix gtt at 20mg/hr and bolus of 10mg. CVP 11. Order received for 500ml of albumin, states not to give the diuril, anesthesia on the way with GLENN. Will continue to monitor.

## 2017-07-29 NOTE — ASSESSMENT & PLAN NOTE
- CTS Primary  - s/p LVAD HM III (7/27/17)  - chest closure (7/28/17)  - extubated (7/29/17)  - CVP 11, SVO2 57%  - on  4, epi 0.06, lasix 20, cardene 1 gtts  - NO 10 PPM  - off vasopressin gtt

## 2017-07-29 NOTE — ASSESSMENT & PLAN NOTE
- LVAD placement 7/27/17  - CTS Primary post-surgery   - will follow up tomorrow - patient in the OR

## 2017-07-29 NOTE — PROGRESS NOTES
Dr. Downing @ bedside for GLENN. Albumin finished infusing, UOP appears to be increasing. Will continue to monitor.

## 2017-07-29 NOTE — SUBJECTIVE & OBJECTIVE
Past Medical History:   Diagnosis Date    Cardiomyopathy     Hyperlipidemia     Hypertension     Obesity     S/P implantation of automatic cardioverter/defibrillator (AICD)     Ventricular tachycardia        Past Surgical History:   Procedure Laterality Date    CARDIAC CATHETERIZATION      CARDIAC DEFIBRILLATOR PLACEMENT         Review of patient's allergies indicates:  No Known Allergies    No current facility-administered medications on file prior to encounter.      Current Outpatient Prescriptions on File Prior to Encounter   Medication Sig    amiodarone (PACERONE) 200 MG Tab Take by mouth once daily.    aspirin 81 MG Chew Take 81 mg by mouth once daily.    DOBUTamine (DOBUTREX) 1,000 mg/250 mL (4,000 mcg/mL) infusion Inject 414 mcg/min into the vein continuous.    furosemide (LASIX) 40 MG tablet Take 1 tablet (40 mg total) by mouth 2 (two) times daily.    potassium chloride SA (K-DUR,KLOR-CON) 20 MEQ tablet Take 20 mEq by mouth once daily.     pravastatin (PRAVACHOL) 20 MG tablet Take 20 mg by mouth every evening.    spironolactone (ALDACTONE) 25 MG tablet Take 1 tablet (25 mg total) by mouth once daily.     Family History     None        Social History Main Topics    Smoking status: Never Smoker    Smokeless tobacco: Never Used    Alcohol use No    Drug use: Unknown    Sexual activity: Not on file     ROS   Not obtainable 2/2 intubation    Objective:     Vital Signs (Most Recent):  Temp: 99.2 °F (37.3 °C) (07/29/17 0700)  Pulse: 97 (07/29/17 0900)  Resp: 16 (07/29/17 0700)  BP: (!) 85/65 (07/29/17 0400)  SpO2: 100 % (07/29/17 0900) Vital Signs (24h Range):  Temp:  [96.4 °F (35.8 °C)-100.7 °F (38.2 °C)] 99.2 °F (37.3 °C)  Pulse:  [] 97  Resp:  [3-23] 16  SpO2:  [85 %-100 %] 100 %  BP: ()/(0-88) 85/65  Arterial Line BP: (74-97)/(59-75) 90/71     Weight: 84.2 kg (185 lb 10 oz)  Body mass index is 28.22 kg/m².    SpO2: 100 %  O2 Device (Oxygen Therapy): ventilator      Intake/Output  Summary (Last 24 hours) at 07/29/17 0934  Last data filed at 07/29/17 0800   Gross per 24 hour   Intake           3795.7 ml   Output             3452 ml   Net            343.7 ml       Lines/Drains/Airways     Central Venous Catheter Line                 Introducer 07/27/17 0731 2 days         Percutaneous Central Line Insertion/Assessment - quad lumen  07/27/17 0731 right internal jugular;other (see comments) 2 days         Pulmonary Artery Catheter Assessment  07/27/17 0731 2 days          Drain                 Chest Tube 07/27/17 1215 1 Right Mediastinal 32 Fr. 1 day         Chest Tube 07/27/17 1216 2 Left Mediastinal 32 Fr. 1 day         NG/OG Tube 07/28/17 1745 orogastric Center mouth less than 1 day         Urethral Catheter 07/28/17 1716 Latex less than 1 day          Airway                 Airway - Non-Surgical 07/27/17 0720 Endotracheal Tube 2 days          Arterial Line                 Arterial Line 07/27/17 0716 Left Other (Comment) 2 days          Line                 VAD 07/27/17 1312 Left ventricular assist device HeartMate 3 1 day          Peripheral Intravenous Line                 Peripheral IV - Single Lumen 07/26/17 1630 Left Antecubital 2 days         Peripheral IV - Single Lumen 07/26/17 2000 Left Wrist 2 days                Physical Exam   Gen: intubated  But arousable  Neck: no JVD, no carotid bruits  CV: VAD hum  Resp: intubated, breathing comfortably  GI: soft, nontender nondistended, normal bowel sounds  Ext: warm well perfused, no edema, no clubbing or cyanosis      Significant Labs:   CMP   Recent Labs  Lab 07/28/17  0357  07/28/17  2349 07/29/17  0400 07/29/17  0802   *  < > 136 135* 135*   K 3.8  < > 4.1 3.9 4.5     < > 104 104 105   CO2 21*  < > 19* 19* 20*   *  < > 137* 134* 140*   BUN 26*  < > 25* 24* 24*   CREATININE 1.3  < > 1.2 1.2 1.2   CALCIUM 9.0  < > 8.5* 8.4* 8.5*   PROT 6.3  --   --   --   --    ALBUMIN 4.1  --   --   --   --    BILITOT 2.6*  --   --   --    --    ALKPHOS 32*  --   --   --   --    AST 99*  --   --   --   --    ALT 18  --   --   --   --    ANIONGAP 11  < > 13 12 10   ESTGFRAFRICA >60.0  < > >60.0 >60.0 >60.0   EGFRNONAA 56.5*  < > >60.0 >60.0 >60.0   < > = values in this interval not displayed., CBC   Recent Labs  Lab 07/28/17 1958 07/28/17 2349 07/29/17  0400   WBC 13.73* 15.70* 16.33*   HGB 9.1* 8.7* 8.7*   HCT 26.6* 25.7* 25.7*   PLT 83* 82* 86*   , INR   Recent Labs  Lab 07/28/17 2349 07/29/17 0400 07/29/17  0802   INR 1.3* 1.4* 1.3*    and All pertinent lab results from the last 24 hours have been reviewed.    Significant Imaging: Echocardiogram:   2D echo with color flow doppler:   Results for orders placed or performed during the hospital encounter of 07/18/17   2D echo with color flow doppler   Result Value Ref Range    Est. PA Systolic Pressure 20.43      Native QRS shorter than Bi-V paced QRS

## 2017-07-29 NOTE — ASSESSMENT & PLAN NOTE
Mr. Hayden is a 67 year old man with recent LVAD complicated by likely RV lead displacement vs damage who is currently recovering in ICU in NSR. EP consulted for possible RV lead revision    --will interrogate Med[employer] ICD non-urgently  --please transition to coumadin as cannot perform lead revision on heparin products  --once INR therapeutic, will tentatively plan on RV lead revision vs replacement    Discussed with Dr. Willett

## 2017-07-29 NOTE — SUBJECTIVE & OBJECTIVE
Interval History: s/p LVAD sx yesterday and went to the OR for chest closure today.    Continuous Infusions:   sodium chloride 0.9% 10 mL/hr at 07/27/17 1451    amiodarone 1 mg/min (07/28/17 1900)    amiodarone      dextrose 5 % and 0.45 % NaCl with KCl 40 mEq 10 mL/hr at 07/27/17 1800    DOBUTamine 4 mcg/kg/min (07/28/17 1900)    epinephrine infusion (NON-TITRATING) 0.07 mcg/kg/min (07/28/17 1132)    furosemide (LASIX) 1 mg/mL infusion (non-titrating) 20 mg/hr (07/28/17 1900)    insulin (HUMAN R) infusion (adults) Stopped (07/28/17 1900)    nicardipine 3.5 mg/hr (07/28/17 1900)    nitric oxide gas      propofol 20 mcg/kg/min (07/28/17 1929)    vasopressin (PITRESSIN) infusion Stopped (07/28/17 1100)     Scheduled Meds:   aspirin  325 mg Oral Daily    aspirin  325 mg Per NG tube Daily    ceFEPime (MAXIPIME) IVPB  1 g Intravenous Q8H    chlorothiazide (DIURIL) IVPB  500 mg Intravenous Once    docusate sodium  200 mg Oral QHS    ferrous gluconate  324 mg Oral Daily with breakfast    mupirocin   Nasal BID    pantoprazole  40 mg Oral Daily    pantoprazole  40 mg Intravenous Daily    pravastatin  20 mg Oral QHS    sodium chloride 0.9%  3 mL Intravenous Q8H     PRN Meds:sodium chloride, sodium chloride, albumin human 5%, albuterol sulfate, bisacodyl, dextrose 50%, dextrose 50%, fentaNYL, magnesium hydroxide 400 mg/5 ml, magnesium sulfate IVPB, oxycodone, oxycodone, potassium chloride, potassium chloride, sodium phosphate IVPB, sodium phosphate IVPB, sodium phosphate IVPB    Review of patient's allergies indicates:  No Known Allergies  Objective:     Vital Signs (Most Recent):  Temp: (!) 100.6 °F (38.1 °C) (07/28/17 1900)  Pulse: 80 (07/28/17 1915)  Resp: 16 (07/28/17 1901)  BP: (!) 170/88 (07/28/17 1700)  SpO2: 100 % (07/28/17 1900) Vital Signs (24h Range):  Temp:  [96.4 °F (35.8 °C)-100.6 °F (38.1 °C)] 100.6 °F (38.1 °C)  Pulse:  [] 80  Resp:  [3-23] 16  SpO2:  [84 %-100 %] 100 %  BP:  ()/(0-88) 170/88  Arterial Line BP: (74-97)/(59-74) 89/71     Weight: 79.7 kg (175 lb 11.3 oz)  Body mass index is 26.72 kg/m².      Intake/Output Summary (Last 24 hours) at 07/28/17 1938  Last data filed at 07/28/17 1900   Gross per 24 hour   Intake             4130 ml   Output             2692 ml   Net             1438 ml       Hemodynamic Parameters:  PAP: (7-46)/(3-25) 44/21  PAP (Mean):  [5 mmHg-32 mmHg] 29 mmHg  CO:  [4.1 L/min-5.4 L/min] 4.4 L/min  CI:  [2.2 L/min/m2-3.1 L/min/m2] 2.2 L/min/m2    Telemetry: no events    Physical Exam    Significant Labs:  CBC:    Recent Labs  Lab 07/28/17  1513   WBC 13.92*   RBC 3.20*   HGB 9.1*   HCT 26.9*   *   MCV 84   MCH 28.4   MCHC 33.8     BNP:    Recent Labs  Lab 07/28/17  0357   BNP 1,184*     CMP:    Recent Labs  Lab 07/28/17  0357  07/28/17  1513   *  < > 128*   CALCIUM 9.0  < > 8.6*   ALBUMIN 4.1  --   --    PROT 6.3  --   --    *  < > 133*   K 3.8  < > 4.5   CO2 21*  < > 19*     < > 103   BUN 26*  < > 25*   CREATININE 1.3  < > 1.2   ALKPHOS 32*  --   --    ALT 18  --   --    AST 99*  --   --    BILITOT 2.6*  --   --    < > = values in this interval not displayed.   Coagulation:     Recent Labs  Lab 07/28/17  1513   INR 1.2   APTT 29.0     LDH:    Recent Labs  Lab 07/28/17  1155   *     Microbiology:  Microbiology Results (last 7 days)     Procedure Component Value Units Date/Time    Blood culture [[id number]] Collected:  07/24/17 1300    Order Status:  Completed Specimen:  Blood from Peripheral, Hand, Right Updated:  07/28/17 1812     Blood Culture, Routine No Growth to date     Blood Culture, Routine No Growth to date     Blood Culture, Routine No Growth to date     Blood Culture, Routine No Growth to date     Blood Culture, Routine No Growth to date    Blood culture [[id number]] Collected:  07/24/17 1100    Order Status:  Completed Specimen:  Blood from Peripheral, Antecubital, Left Updated:  07/28/17 1412     Blood  Culture, Routine No Growth to date     Blood Culture, Routine No Growth to date     Blood Culture, Routine No Growth to date     Blood Culture, Routine No Growth to date     Blood Culture, Routine No Growth to date    Urine culture [[id number]] Collected:  07/24/17 1139    Order Status:  Completed Specimen:  Urine from Urine, Clean Catch Updated:  07/26/17 0245     Urine Culture, Routine --     ENTEROCOCCUS SPECIES  >100,000 cfu/ml  Identification and susceptibility pending            I have reviewed all pertinent labs within the past 24 hours.    Estimated Creatinine Clearance: 57.8 mL/min (based on Cr of 1.2).    Diagnostic Results:  I have reviewed and interpreted all pertinent imaging results/findings within the past 24 hours.

## 2017-07-29 NOTE — PLAN OF CARE
Problem: Physical Therapy Goal  Goal: Physical Therapy Goal  Goals to be met by: 17     Patient will increase functional independence with mobility by performin. Supine to sit with MInimal Assistance  2. Sit to supine with MInimal Assistance  3. Sit to stand transfer with Minimal Assistance  4. Bed to chair transfer with Minimal Assistance  5. Gait  x 50 feet with Minimal Assistance.   6. Lower extremity exercise program x15 reps, with supervision, in order to increase LE strength and (I) with functional mobility.     Outcome: Ongoing (interventions implemented as appropriate)  PT evaluation complete and appropriate goals established.    Kely Willett, PT, DPT   2017  [phone]

## 2017-07-29 NOTE — PROGRESS NOTES
"[facility]  Endocrinology  Progress Note    Admit Date: 7/18/2017     Reason for Consult: Management of  Hyperglycemia     Surgical Procedure and Date: 7/27/17 s/p LAVD     HPI: 67 y.o. gentleman with PNICM (EF 10%) on home  at 5 mcg/kg/min, esophagitis, HTN, HLD, s/p Med[employer] CRT-D,SOB (NYHA Class III), 3 pillow orthopnea. He is now s/p LVAD placement and endocrine in consulted for bg management.     Interval HPI:   Overnight events:  Off insulin gtt for 12hr and BS at goal    BP (!) 85/65   Pulse 99   Temp 99.2 °F (37.3 °C) (Core ([place]))   Resp 16   Ht 5' 8" (1.727 m)   Wt 84.2 kg (185 lb 10 oz)   SpO2 100%   BMI 28.22 kg/m²       Labs Reviewed and Include      Recent Labs  Lab 07/29/17  0400   *   CALCIUM 8.4*   *   K 3.9   CO2 19*      BUN 24*   CREATININE 1.2     Lab Results   Component Value Date    WBC 16.33 (H) 07/29/2017    HGB 8.7 (L) 07/29/2017    HCT 25.7 (L) 07/29/2017    MCV 83 07/29/2017    PLT 86 (L) 07/29/2017     No results for input(s): TSH, FREET4 in the last 168 hours.  Lab Results   Component Value Date    HGBA1C 5.4 07/06/2017       Nutritional status:   Body mass index is 28.22 kg/m².  Lab Results   Component Value Date    ALBUMIN 4.1 07/28/2017    ALBUMIN 3.0 (L) 07/27/2017    ALBUMIN 3.0 (L) 07/27/2017     Lab Results   Component Value Date    PREALBUMIN 13 (L) 07/28/2017    PREALBUMIN 18 (L) 07/06/2017       Estimated Creatinine Clearance: 63.1 mL/min (based on Cr of 1.2).    Accu-Checks  Recent Labs      07/28/17   1156  07/28/17   1404  07/28/17   1620  07/28/17   1757  07/28/17   2000  07/28/17   2209  07/29/17   0003  07/29/17   0303  07/29/17   0401  07/29/17   0612   POCTGLUCOSE  165*  120*  119*  137*  145*  135*  134*  130*  161*  139*       Current Medications and/or Treatments Impacting Glycemic Control  Immunotherapy:  Immunosuppressants     None        Steroids:   Hormones     Start     Stop Route Frequency Ordered    " 07/27/17 1400  vasopressin (PITRESSIN) 100 Units in dextrose 5 % 100 mL infusion      -- IV Continuous 07/27/17 1403        Pressors:    Autonomic Drugs     Start     Stop Route Frequency Ordered    07/27/17 1353  EPINEPHrine (ADRENALIN) 4 mg in sodium chloride 0.9% 250 mL infusion      -- IV Continuous 07/27/17 1355        Hyperglycemia/Diabetes Medications: Antihyperglycemics     Start     Stop Route Frequency Ordered    07/29/17 0910  insulin aspart pen 0-5 Units      -- SubQ Every 4 hours PRN 07/29/17 0810          ASSESSMENT and PLAN    Hyperglycemia    bg goal 140-180,   Weaned off insulin gtt, start novolog low dose correction prn, q4h checks     Discharge planning: anticipating resolution            INDIRA (acute kidney injury)     Body mass index is 26.72 kg/m².  Avoid hypoglycemia           Acute on chronic combined systolic and diastolic heart failure     S/p lvad          CHF (NYHA class IV, ACC/AHA stage D)     S/p lvad          Nonischemic cardiomyopathy     S/p lvad       Angela Valdez MD  Endocrinology  [facility]

## 2017-07-30 LAB
ALLENS TEST: ABNORMAL
ALLENS TEST: ABNORMAL
ANION GAP SERPL CALC-SCNC: 10 MMOL/L
ANION GAP SERPL CALC-SCNC: 12 MMOL/L
ANION GAP SERPL CALC-SCNC: 13 MMOL/L
ANION GAP SERPL CALC-SCNC: 9 MMOL/L
ANISOCYTOSIS BLD QL SMEAR: ABNORMAL
APTT BLDCRRT: 32.6 SEC
APTT BLDCRRT: 33.6 SEC
APTT BLDCRRT: 35.8 SEC
APTT BLDCRRT: 36.2 SEC
BASOPHILS # BLD AUTO: ABNORMAL K/UL
BASOPHILS NFR BLD: 0 %
BLD PROD TYP BPU: NORMAL
BLOOD UNIT EXPIRATION DATE: NORMAL
BLOOD UNIT TYPE CODE: 5100
BLOOD UNIT TYPE: NORMAL
BUN SERPL-MCNC: 27 MG/DL
BUN SERPL-MCNC: 28 MG/DL
BUN SERPL-MCNC: 28 MG/DL
BUN SERPL-MCNC: 30 MG/DL
BURR CELLS BLD QL SMEAR: ABNORMAL
CALCIUM SERPL-MCNC: 8.5 MG/DL
CALCIUM SERPL-MCNC: 8.5 MG/DL
CALCIUM SERPL-MCNC: 8.6 MG/DL
CALCIUM SERPL-MCNC: 8.6 MG/DL
CHLORIDE SERPL-SCNC: 103 MMOL/L
CHLORIDE SERPL-SCNC: 104 MMOL/L
CO2 SERPL-SCNC: 21 MMOL/L
CO2 SERPL-SCNC: 22 MMOL/L
CODING SYSTEM: NORMAL
CREAT SERPL-MCNC: 1.1 MG/DL
CREAT SERPL-MCNC: 1.2 MG/DL
DELSYS: ABNORMAL
DELSYS: ABNORMAL
DIFFERENTIAL METHOD: ABNORMAL
DISPENSE STATUS: NORMAL
EOSINOPHIL # BLD AUTO: ABNORMAL K/UL
EOSINOPHIL NFR BLD: 0 %
ERYTHROCYTE [DISTWIDTH] IN BLOOD BY AUTOMATED COUNT: 15.7 %
ERYTHROCYTE [SEDIMENTATION RATE] IN BLOOD BY WESTERGREN METHOD: 26 MM/H
ERYTHROCYTE [SEDIMENTATION RATE] IN BLOOD BY WESTERGREN METHOD: 30 MM/H
EST. GFR  (AFRICAN AMERICAN): >60 ML/MIN/1.73 M^2
EST. GFR  (NON AFRICAN AMERICAN): >60 ML/MIN/1.73 M^2
FLOW: 4
FLOW: 5
GLUCOSE SERPL-MCNC: 123 MG/DL
GLUCOSE SERPL-MCNC: 123 MG/DL
GLUCOSE SERPL-MCNC: 135 MG/DL
GLUCOSE SERPL-MCNC: 149 MG/DL
HCO3 UR-SCNC: 23.7 MMOL/L (ref 24–28)
HCO3 UR-SCNC: 24.2 MMOL/L (ref 24–28)
HCT VFR BLD AUTO: 25.3 %
HGB BLD-MCNC: 8.7 G/DL
HYPOCHROMIA BLD QL SMEAR: ABNORMAL
INR PPP: 1.2
LDH SERPL L TO P-CCNC: 507 U/L
LYMPHOCYTES # BLD AUTO: ABNORMAL K/UL
LYMPHOCYTES NFR BLD: 11 %
MAGNESIUM SERPL-MCNC: 1.8 MG/DL
MAGNESIUM SERPL-MCNC: 2.1 MG/DL
MAGNESIUM SERPL-MCNC: 2.3 MG/DL
MAGNESIUM SERPL-MCNC: 2.5 MG/DL
MCH RBC QN AUTO: 28.6 PG
MCHC RBC AUTO-ENTMCNC: 34.4 G/DL
MCV RBC AUTO: 83 FL
MODE: ABNORMAL
MODE: ABNORMAL
MONOCYTES # BLD AUTO: ABNORMAL K/UL
MONOCYTES NFR BLD: 5 %
NEUTROPHILS NFR BLD: 84 %
OVALOCYTES BLD QL SMEAR: ABNORMAL
PCO2 BLDA: 38.7 MMHG (ref 35–45)
PCO2 BLDA: 40.6 MMHG (ref 35–45)
PH SMN: 7.37 [PH] (ref 7.35–7.45)
PH SMN: 7.4 [PH] (ref 7.35–7.45)
PHOSPHATE SERPL-MCNC: 3.1 MG/DL
PLATELET # BLD AUTO: 99 K/UL
PMV BLD AUTO: 10.3 FL
PO2 BLDA: 158 MMHG (ref 80–100)
PO2 BLDA: 29 MMHG (ref 40–60)
POC BE: -1 MMOL/L
POC BE: -2 MMOL/L
POC SATURATED O2: 53 % (ref 95–100)
POC SATURATED O2: 99 % (ref 95–100)
POC TCO2: 25 MMOL/L (ref 23–27)
POC TCO2: 25 MMOL/L (ref 24–29)
POCT GLUCOSE: 122 MG/DL (ref 70–110)
POCT GLUCOSE: 132 MG/DL (ref 70–110)
POCT GLUCOSE: 134 MG/DL (ref 70–110)
POCT GLUCOSE: 153 MG/DL (ref 70–110)
POIKILOCYTOSIS BLD QL SMEAR: SLIGHT
POLYCHROMASIA BLD QL SMEAR: ABNORMAL
POTASSIUM SERPL-SCNC: 3.8 MMOL/L
POTASSIUM SERPL-SCNC: 3.8 MMOL/L
POTASSIUM SERPL-SCNC: 4.1 MMOL/L
POTASSIUM SERPL-SCNC: 4.4 MMOL/L
PROTHROMBIN TIME: 12.7 SEC
RBC # BLD AUTO: 3.04 M/UL
SAMPLE: ABNORMAL
SAMPLE: ABNORMAL
SITE: ABNORMAL
SITE: ABNORMAL
SODIUM SERPL-SCNC: 134 MMOL/L
SODIUM SERPL-SCNC: 135 MMOL/L
SODIUM SERPL-SCNC: 136 MMOL/L
SODIUM SERPL-SCNC: 137 MMOL/L
SP02: 99
TRANS ERYTHROCYTES VOL PATIENT: NORMAL ML
WBC # BLD AUTO: 15.28 K/UL

## 2017-07-30 PROCEDURE — 83735 ASSAY OF MAGNESIUM: CPT

## 2017-07-30 PROCEDURE — 80048 BASIC METABOLIC PNL TOTAL CA: CPT

## 2017-07-30 PROCEDURE — A4216 STERILE WATER/SALINE, 10 ML: HCPCS | Performed by: STUDENT IN AN ORGANIZED HEALTH CARE EDUCATION/TRAINING PROGRAM

## 2017-07-30 PROCEDURE — 97530 THERAPEUTIC ACTIVITIES: CPT

## 2017-07-30 PROCEDURE — P9021 RED BLOOD CELLS UNIT: HCPCS

## 2017-07-30 PROCEDURE — 86901 BLOOD TYPING SEROLOGIC RH(D): CPT

## 2017-07-30 PROCEDURE — 85027 COMPLETE CBC AUTOMATED: CPT

## 2017-07-30 PROCEDURE — 99233 SBSQ HOSP IP/OBS HIGH 50: CPT | Mod: ,,, | Performed by: INTERNAL MEDICINE

## 2017-07-30 PROCEDURE — 97116 GAIT TRAINING THERAPY: CPT

## 2017-07-30 PROCEDURE — 86900 BLOOD TYPING SEROLOGIC ABO: CPT

## 2017-07-30 PROCEDURE — 63600367 HC NITRIC OXIDE PER HOUR

## 2017-07-30 PROCEDURE — 99900035 HC TECH TIME PER 15 MIN (STAT)

## 2017-07-30 PROCEDURE — 63600175 PHARM REV CODE 636 W HCPCS

## 2017-07-30 PROCEDURE — 27000248 HC VAD-ADDITIONAL DAY

## 2017-07-30 PROCEDURE — 85610 PROTHROMBIN TIME: CPT

## 2017-07-30 PROCEDURE — 99232 SBSQ HOSP IP/OBS MODERATE 35: CPT | Mod: GC,,, | Performed by: ANESTHESIOLOGY

## 2017-07-30 PROCEDURE — 94760 N-INVAS EAR/PLS OXIMETRY 1: CPT

## 2017-07-30 PROCEDURE — 85730 THROMBOPLASTIN TIME PARTIAL: CPT | Mod: 91

## 2017-07-30 PROCEDURE — 94761 N-INVAS EAR/PLS OXIMETRY MLT: CPT

## 2017-07-30 PROCEDURE — 80048 BASIC METABOLIC PNL TOTAL CA: CPT | Mod: 91

## 2017-07-30 PROCEDURE — 20000000 HC ICU ROOM

## 2017-07-30 PROCEDURE — 63600175 PHARM REV CODE 636 W HCPCS: Performed by: STUDENT IN AN ORGANIZED HEALTH CARE EDUCATION/TRAINING PROGRAM

## 2017-07-30 PROCEDURE — 85730 THROMBOPLASTIN TIME PARTIAL: CPT

## 2017-07-30 PROCEDURE — 83880 ASSAY OF NATRIURETIC PEPTIDE: CPT

## 2017-07-30 PROCEDURE — 85007 BL SMEAR W/DIFF WBC COUNT: CPT

## 2017-07-30 PROCEDURE — 99232 SBSQ HOSP IP/OBS MODERATE 35: CPT | Mod: ,,, | Performed by: INTERNAL MEDICINE

## 2017-07-30 PROCEDURE — 27000221 HC OXYGEN, UP TO 24 HOURS

## 2017-07-30 PROCEDURE — 36600 WITHDRAWAL OF ARTERIAL BLOOD: CPT

## 2017-07-30 PROCEDURE — 84100 ASSAY OF PHOSPHORUS: CPT

## 2017-07-30 PROCEDURE — 82803 BLOOD GASES ANY COMBINATION: CPT

## 2017-07-30 PROCEDURE — 83615 LACTATE (LD) (LDH) ENZYME: CPT | Mod: 91

## 2017-07-30 PROCEDURE — 25000003 PHARM REV CODE 250: Performed by: STUDENT IN AN ORGANIZED HEALTH CARE EDUCATION/TRAINING PROGRAM

## 2017-07-30 PROCEDURE — 83735 ASSAY OF MAGNESIUM: CPT | Mod: 91

## 2017-07-30 RX ORDER — LANOLIN ALCOHOL/MO/W.PET/CERES
400 CREAM (GRAM) TOPICAL 3 TIMES DAILY
Status: DISCONTINUED | OUTPATIENT
Start: 2017-07-30 | End: 2017-08-05

## 2017-07-30 RX ORDER — WARFARIN 2 MG/1
2 TABLET ORAL ONCE
Status: COMPLETED | OUTPATIENT
Start: 2017-07-30 | End: 2017-07-30

## 2017-07-30 RX ORDER — ONDANSETRON 2 MG/ML
INJECTION INTRAMUSCULAR; INTRAVENOUS
Status: COMPLETED
Start: 2017-07-30 | End: 2017-07-30

## 2017-07-30 RX ORDER — POTASSIUM CHLORIDE 750 MG/1
20 CAPSULE, EXTENDED RELEASE ORAL 2 TIMES DAILY
Status: DISCONTINUED | OUTPATIENT
Start: 2017-07-30 | End: 2017-08-05

## 2017-07-30 RX ORDER — ONDANSETRON 4 MG/1
4 TABLET, FILM COATED ORAL EVERY 8 HOURS PRN
Status: DISCONTINUED | OUTPATIENT
Start: 2017-07-30 | End: 2017-07-30

## 2017-07-30 RX ORDER — ONDANSETRON 2 MG/ML
4 INJECTION INTRAMUSCULAR; INTRAVENOUS EVERY 8 HOURS PRN
Status: DISCONTINUED | OUTPATIENT
Start: 2017-07-30 | End: 2017-08-02

## 2017-07-30 RX ADMIN — OXYCODONE HYDROCHLORIDE 5 MG: 5 TABLET ORAL at 08:07

## 2017-07-30 RX ADMIN — MAGNESIUM OXIDE TAB 400 MG (241.3 MG ELEMENTAL MG) 400 MG: 400 (241.3 MG) TAB at 09:07

## 2017-07-30 RX ADMIN — WARFARIN SODIUM 2 MG: 2 TABLET ORAL at 05:07

## 2017-07-30 RX ADMIN — ONDANSETRON 4 MG: 2 INJECTION INTRAMUSCULAR; INTRAVENOUS at 06:07

## 2017-07-30 RX ADMIN — POTASSIUM CHLORIDE 40 MEQ: 200 INJECTION, SOLUTION INTRAVENOUS at 07:07

## 2017-07-30 RX ADMIN — CEFEPIME HYDROCHLORIDE 1 G: 1 INJECTION, SOLUTION INTRAVENOUS at 05:07

## 2017-07-30 RX ADMIN — POTASSIUM CHLORIDE 20 MEQ: 750 CAPSULE, EXTENDED RELEASE ORAL at 09:07

## 2017-07-30 RX ADMIN — CEFEPIME HYDROCHLORIDE 1 G: 1 INJECTION, SOLUTION INTRAVENOUS at 12:07

## 2017-07-30 RX ADMIN — Medication 3 ML: at 11:07

## 2017-07-30 RX ADMIN — DOCUSATE SODIUM 200 MG: 100 CAPSULE, LIQUID FILLED ORAL at 09:07

## 2017-07-30 RX ADMIN — MAGNESIUM SULFATE IN WATER 2 G: 40 INJECTION, SOLUTION INTRAVENOUS at 07:07

## 2017-07-30 RX ADMIN — MUPIROCIN: 20 OINTMENT TOPICAL at 09:07

## 2017-07-30 RX ADMIN — POTASSIUM CHLORIDE 40 MEQ: 200 INJECTION, SOLUTION INTRAVENOUS at 01:07

## 2017-07-30 RX ADMIN — NICARDIPINE HYDROCHLORIDE 5 MG/HR: 0.2 INJECTION, SOLUTION INTRAVENOUS at 08:07

## 2017-07-30 RX ADMIN — PRAVASTATIN SODIUM 20 MG: 20 TABLET ORAL at 09:07

## 2017-07-30 RX ADMIN — PANTOPRAZOLE SODIUM 40 MG: 40 TABLET, DELAYED RELEASE ORAL at 08:07

## 2017-07-30 RX ADMIN — ASPIRIN 325 MG: 325 TABLET, DELAYED RELEASE ORAL at 08:07

## 2017-07-30 RX ADMIN — CEFEPIME HYDROCHLORIDE 1 G: 1 INJECTION, SOLUTION INTRAVENOUS at 08:07

## 2017-07-30 RX ADMIN — FERROUS GLUCONATE TAB 324 MG (37.5 MG ELEMENTAL IRON) 324 MG: 324 (37.5 FE) TAB at 07:07

## 2017-07-30 RX ADMIN — MAGNESIUM OXIDE TAB 400 MG (241.3 MG ELEMENTAL MG) 400 MG: 400 (241.3 MG) TAB at 01:07

## 2017-07-30 RX ADMIN — POTASSIUM CHLORIDE 20 MEQ: 750 CAPSULE, EXTENDED RELEASE ORAL at 08:07

## 2017-07-30 NOTE — ASSESSMENT & PLAN NOTE
- LVAD HM III  - Speed 5100  - no events  - LDH increased to 507. Monitor daily  - INR 1.2  - on heparin gtt

## 2017-07-30 NOTE — SUBJECTIVE & OBJECTIVE
Interval History: NAEON. Feels well. Pain has significantly improved. Weaning on drips. Still has 2 chest tubes in. Noted to be febrile yesterday, currently on cefepime.     Continuous Infusions:   sodium chloride 0.9% 10 mL/hr at 07/27/17 1451    amiodarone 0.5 mg/min (07/30/17 1200)    dextrose 5 % and 0.9 % NaCl 10 mL/hr at 07/30/17 0026    DOBUTamine 4 mcg/kg/min (07/30/17 1200)    epinephrine infusion (NON-TITRATING) 0.04 mcg/kg/min (07/30/17 1200)    furosemide (LASIX) 1 mg/mL infusion (non-titrating) 20 mg/hr (07/30/17 1200)    heparin (porcine) in 5 % dex 700 Units/hr (07/30/17 1200)    nicardipine Stopped (07/30/17 0900)    nitric oxide gas      propofol Stopped (07/29/17 0800)    vasopressin (PITRESSIN) infusion Stopped (07/28/17 1100)     Scheduled Meds:   aspirin  325 mg Oral Daily    aspirin  325 mg Per NG tube Daily    ceFEPime (MAXIPIME) IVPB  1 g Intravenous Q8H    chlorothiazide (DIURIL) IVPB  500 mg Intravenous Once    docusate sodium  200 mg Oral QHS    ferrous gluconate  324 mg Oral Daily with breakfast    magnesium oxide  400 mg Oral TID    mupirocin   Nasal BID    pantoprazole  40 mg Oral Daily    pantoprazole  40 mg Intravenous Daily    potassium chloride  20 mEq Oral BID    pravastatin  20 mg Oral QHS    sodium chloride 0.9%  3 mL Intravenous Q8H    warfarin  2 mg Oral Once     PRN Meds:sodium chloride, sodium chloride, albumin human 5%, albuterol sulfate, bisacodyl, dextrose 50%, fentaNYL, glucagon (human recombinant), insulin aspart, magnesium hydroxide 400 mg/5 ml, magnesium sulfate IVPB, oxycodone, oxycodone, potassium chloride, potassium chloride, sodium phosphate IVPB, sodium phosphate IVPB, sodium phosphate IVPB    Review of patient's allergies indicates:  No Known Allergies  Objective:     Vital Signs (Most Recent):  Temp: 99 °F (37.2 °C) (07/30/17 1100)  Pulse: 97 (07/30/17 1200)  Resp: (!) 30 (07/30/17 1200)  BP: (!) 82/0 (07/29/17 2300)  SpO2: 99 %  (07/30/17 0200) Vital Signs (24h Range):  Temp:  [99 °F (37.2 °C)-99.8 °F (37.7 °C)] 99 °F (37.2 °C)  Pulse:  [] 97  Resp:  [22-35] 30  SpO2:  [96 %-99 %] 99 %  BP: (82)/(0) 82/0  Arterial Line BP: (73-93)/(54-74) 84/66     Weight: 84.2 kg (185 lb 10 oz)  Body mass index is 28.22 kg/m².      Intake/Output Summary (Last 24 hours) at 07/30/17 1216  Last data filed at 07/30/17 1200   Gross per 24 hour   Intake             1636 ml   Output             2779 ml   Net            -1143 ml       Hemodynamic Parameters:  PAP: (33-61)/(9-27) 41/15  PAP (Mean):  [19 mmHg-38 mmHg] 25 mmHg  CO:  [4.6 L/min-6.4 L/min] 4.8 L/min  CI:  [2.3 L/min/m2-2.9 L/min/m2] 2.4 L/min/m2    Telemetry: no events    Physical Exam  Constitutional: NAD, conversant  HEENT: Sclera anicteric, PERRLA, EOMI  Neck: Positive JVD, no carotid bruits  CV: RRR, no murmur, normal S1/S2, +S3, No Pericardial rub  Pulm: CTAB with no wheezes, rales, or rhonchi, two chest tubes in place  GI: Abdomen soft, NTND, +BS  Extremities: No LE edema, warm and well perfused, No cyanosis, No clubbing  Skin: No ecchymosis, erythema, or ulcers  Psych: AOx3, appropriate affect  Neuro: CNII-XII intact, no focal deficits       Significant Labs:  CBC:    Recent Labs  Lab 07/30/17  0400   WBC 15.28*   RBC 3.04*   HGB 8.7*   HCT 25.3*   PLT 99*   MCV 83   MCH 28.6   MCHC 34.4     BNP:    Recent Labs  Lab 07/28/17  0357   BNP 1,184*     CMP:    Recent Labs  Lab 07/28/17  0357  07/30/17  0600   *  < > 123*   CALCIUM 9.0  < > 8.6*   ALBUMIN 4.1  --   --    PROT 6.3  --   --    *  < > 136   K 3.8  < > 3.8   CO2 21*  < > 21*     < > 103   BUN 26*  < > 28*   CREATININE 1.3  < > 1.1   ALKPHOS 32*  --   --    ALT 18  --   --    AST 99*  --   --    BILITOT 2.6*  --   --    < > = values in this interval not displayed.   Coagulation:     Recent Labs  Lab 07/30/17  0600   INR 1.2   APTT 32.6*     LDH:    Recent Labs  Lab 07/28/17  1155 07/29/17  0400 07/30/17  0400   LDH  388* 429* 507*     Microbiology:  Microbiology Results (last 7 days)     Procedure Component Value Units Date/Time    Blood culture [[id number]] Collected:  07/24/17 1300    Order Status:  Completed Specimen:  Blood from Peripheral, Hand, Right Updated:  07/29/17 1812     Blood Culture, Routine No growth after 5 days.    Urine culture [[id number]]  (Susceptibility) Collected:  07/24/17 1139    Order Status:  Completed Specimen:  Urine from Urine, Clean Catch Updated:  07/29/17 1506     Urine Culture, Routine --     ENTEROCOCCUS FAECALIS  >100,000 cfu/ml  No other significant isolate      Blood culture [[id number]] Collected:  07/24/17 1100    Order Status:  Completed Specimen:  Blood from Peripheral, Antecubital, Left Updated:  07/29/17 1412     Blood Culture, Routine No growth after 5 days.          I have reviewed all pertinent labs within the past 24 hours.    Estimated Creatinine Clearance: 68.9 mL/min (based on Cr of 1.1).    Diagnostic Results:  I have reviewed and interpreted all pertinent imaging results/findings within the past 24 hours.

## 2017-07-30 NOTE — PROGRESS NOTES
LVAD dsg changed using sterile technique. Insertion site clean, dry, and intact. No redness noted.

## 2017-07-30 NOTE — PROGRESS NOTES
Progress Note  Surgical Intensive Care    Admit Date: 7/18/2017  Post-operative Day: 2 Days Post-Op  Hospital Day: 13    SUBJECTIVE:     Follow-up For:  Procedure(s) (LRB):  CLOSURE-STERNAL WOUND (N/A)  PLACEMENT-NEOPERICARDIUM (N/A)   S/p sternotomy closure 7/18/17    Interval history: Patient was extubated yesterday. Sating well on 5L NC. On nitric oxide. Currently on heparin, epi, lasix , propofol, and amiodarone gtt. Cardene gtt has been on and off overnight, currently on at 5. R mediastinal tube drain minmal and L mediastinal tubes 200cc in last 12 hours. Good UOP >100cc/hr.    Continuous Infusions:   sodium chloride 0.9% 10 mL/hr at 07/27/17 1451    amiodarone 0.5 mg/min (07/30/17 0000)    dextrose 5 % and 0.9 % NaCl 10 mL/hr at 07/30/17 0026    DOBUTamine 4 mcg/kg/min (07/30/17 0000)    epinephrine infusion (NON-TITRATING) 0.05 mcg/kg/min (07/30/17 0000)    furosemide (LASIX) 1 mg/mL infusion (non-titrating) 20 mg/hr (07/30/17 0000)    heparin (porcine) in 5 % dex 500 Units/hr (07/30/17 0026)    nicardipine 1 mg/hr (07/30/17 0000)    nitric oxide gas      propofol Stopped (07/29/17 0800)    vasopressin (PITRESSIN) infusion Stopped (07/28/17 1100)     Scheduled Meds:   aspirin  325 mg Oral Daily    aspirin  325 mg Per NG tube Daily    ceFEPime (MAXIPIME) IVPB  1 g Intravenous Q8H    chlorothiazide (DIURIL) IVPB  500 mg Intravenous Once    docusate sodium  200 mg Oral QHS    ferrous gluconate  324 mg Oral Daily with breakfast    mupirocin   Nasal BID    pantoprazole  40 mg Oral Daily    pantoprazole  40 mg Intravenous Daily    pravastatin  20 mg Oral QHS    sodium chloride 0.9%  3 mL Intravenous Q8H     PRN Meds:sodium chloride, sodium chloride, albumin human 5%, albuterol sulfate, bisacodyl, dextrose 50%, fentaNYL, glucagon (human recombinant), insulin aspart, magnesium hydroxide 400 mg/5 ml, magnesium sulfate IVPB, oxycodone, oxycodone, potassium chloride, potassium chloride, sodium  phosphate IVPB, sodium phosphate IVPB, sodium phosphate IVPB    Review of patient's allergies indicates:  No Known Allergies    OBJECTIVE:     Vital Signs (Most Recent)  Temp: 99.2 °F (37.3 °C) (07/30/17 0700)  Pulse: 97 (07/30/17 0700)  Resp: (!) 29 (07/30/17 0700)  BP: (!) 82/0 (07/29/17 2300)  SpO2: 98 % (07/29/17 2300)    Vital Signs Range (Last 24H):  Temp:  [99.2 °F (37.3 °C)-100.1 °F (37.8 °C)]   Pulse:  []   Resp:  [22-35]   BP: (82)/(0)   SpO2:  [73 %-100 %]   Arterial Line BP: (80-93)/(64-76)     I & O (Last 24H):    Intake/Output Summary (Last 24 hours) at 07/30/17 0718  Last data filed at 07/30/17 0700   Gross per 24 hour   Intake             1518 ml   Output             2554 ml   Net            -1036 ml     Ventilator Data (Last 24H):     Vent Mode: Spont  Oxygen Concentration (%):  [50] 50  Resp Rate Total:  [16 br/min-20 br/min] 20 br/min  Vt Set:  [500 mL] 500 mL  PEEP/CPAP:  [5 cmH20] 5 cmH20  Pressure Support:  [5 cmH20-10 cmH20] 5 cmH20  Mean Airway Pressure:  [8 cmH20-9.9 cmH20] 8 cmH20    Hemodynamic Parameters (Last 24H):  PAP: (36-61)/(14-27) 47/20  PAP (Mean):  [25 mmHg-38 mmHg] 30 mmHg  CO:  [4.9 L/min-6.4 L/min] 6.4 L/min  CI:  [2.5 L/min/m2-2.9 L/min/m2] 2.9 L/min/m2    Physical Exam   Constitutional: He appears well-developed and well-nourished. No distress. Resting comfortably   HENT:   Head: Normocephalic and atraumatic.   Eyes: Right eye exhibits no discharge. Left eye exhibits no discharge. No scleral icterus.   Neck: Normal range of motion. Neck supple.   Cardiovascular: Intact distal pulses.    LVAD hum present.   Pulmonary/Chest: Effort normal. No respiratory distress. NC 5L  R and L meds chest tubes in place- minimal serosanguinous drainage.  Bandage over sternotomy incision   Abdominal: Soft. He exhibits no distension.   Skin: Skin is warm and dry.     Laboratory (Last 24H):  CBC:    Recent Labs  Lab 07/30/17  0400   WBC 15.28*   HGB 8.7*   HCT 25.3*   PLT 99*      CMP:    Recent Labs  Lab 07/30/17  0600   CALCIUM 8.6*      K 3.8   CO2 21*      BUN 28*   CREATININE 1.1       ASSESSMENT/PLAN:     Neuro:   - AAOx3  -off sedation     Pulmonary:   -on 5L NC- sating well  -extubated yesterday  -Chest tubes - R med and 2 L meds. Minimal output     Cardiac:  -MAP range goal >65  -Dobutamine gtt of  4  -Epi of 0.07  -currently on cardene drip    Renal:   -Singer in place  -Bun/Cr stable 28/1.1  -UOP >100cc/hr  -receiving lasix gtt 20     Fluids/Electrolytes/Nutrition/GI:   -Nutritional status: NPO  -NGT for meds  -replace lytes PRN     Hematology/Oncology:  -H/H not drawn today  -INR 1.2  -on heparin gtt  -Aspirin 325mg     Infectious Disease:   -Afebrile   -WBC not drawn today  -cefepime  -Cultures NGTD     Endocrine:  -endocrine following: plan to start low dose novolog correction  -bg goal 140-180  -off insulin gtt     Dispo:  -Continue care in the ICU setting. Out of bed and in chair today.    DARIELA Kumar, PGY1   General Surgery- [facility]

## 2017-07-30 NOTE — ASSESSMENT & PLAN NOTE
- appropriate in the setting of VT aggravated by underlying AT/AFL (earlier during the admission)  - 7/28 - setting of AF undersense - device reprogrammed to VVI 80  - tachy therapy turned off  - on amio gtt  - EP to reassess device in AM - if issues remain, will schedule for lead revision

## 2017-07-30 NOTE — ASSESSMENT & PLAN NOTE
- CTS Primary  - s/p LVAD HM III (7/27/17)  - chest closure (7/28/17)  - extubated (7/29/17)  - CVP 10, SVO2 53%  - on  4, epi 0.05, lasix 20  - off cardene, vasopressin gtt  - NO decreased to 5PPM

## 2017-07-30 NOTE — PT/OT/SLP PROGRESS
Physical Therapy  Treatment    Suman Hayden   MRN: [id number]   Admitting Diagnosis: Acute on chronic combined systolic and diastolic heart failure    PT Received On: 07/30/17  PT Start Time: 0834     PT Stop Time: 0906    PT Total Time (min): 32 min       Billable Minutes:  Gait [id number] and Therapeutic Activity 8 (co-tx with OT)    Treatment Type: Treatment  PT/PTA: PT     PTA Visit Number: 0       General Precautions: Standard, fall, LVAD, sternal, [place] Carmen catheter (PA cordis)  Orthopedic Precautions: N/A   Braces: N/A    Do you have any cultural, spiritual, Sabianist conflicts, given your current situation?: none noted     Subjective:  Communicated with RN prior to session.  Pt agreeable to therapy.     Pain/Comfort  Pain Rating 1: 0/10    Objective:   Patient found with: telemetry, central line, blood pressure cuff, arterial line, delgado catheter, oxygen, chest tube, pulse ox (continuous), peripheral IV, SCD (nitric oxide, [place] Carmen, LVAD to wall power)    Functional Mobility:  Bed Mobility:   Scooting/Bridging: Moderate Assistance (seated scooting to EOB)  Supine to Sit: Moderate Assistance    Transfers:  Sit <> Stand Assistance: Moderate Assistance  Sit <> Stand Assistive Device: No Assistive Device  Bed <> Chair Technique: Stand Pivot  Bed <> Chair Assistance: Moderate Assistance  Bed <> Chair Assistive Device: No Assistive Device (HHA)    Gait:   Gait Distance: ~1 ft stand pivot bed>chair   Assistance 1: Moderate assistance (with 2nd person managing lines)  Gait Assistive Device: Hand held assist  Gait Pattern: 2-point gait  Gait Deviation(s): decreased hal, increased time in double stance, decreased velocity of limb motion, decreased toe-to-floor clearance, decreased step length, decreased weight-shifting ability, decreased stride length   Cues for upright standing when initially coming to stand prior to ambulation. Decreased floor clearance BLE; Increased difficulty weight-shifting appropriately      Balance:   Static Sit: CGA-SBA  Dynamic Sit: min-modA  Static Stand: min-modA  Dynamic stand: modA     Therapeutic Activities and Exercises:  Pt found on wall power and remained on wall power throughout session. Reviewed LVAD components: controller, driveline, power cables.  Reviewed sternal precautions. Pt able to maintain throughout session with min cueing.    Therapeutic activities/gait training performed as described above in order to increase safety and independence with functional mobility.   Vitals signs monitored and stable throughout.     AM-PAC 6 CLICK MOBILITY  How much help from another person does this patient currently need?   1 = Unable, Total/Dependent Assistance  2 = A lot, Maximum/Moderate Assistance  3 = A little, Minimum/Contact Guard/Supervision  4 = None, Modified [place]/Independent    Turning over in bed (including adjusting bedclothes, sheets and blankets)?: 2  Sitting down on and standing up from a chair with arms (e.g., wheelchair, bedside commode, etc.): 2  Moving from lying on back to sitting on the side of the bed?: 2  Moving to and from a bed to a chair (including a wheelchair)?: 2  Need to walk in hospital room?: 2  Climbing 3-5 steps with a railing?: 1  Total Score: 11    AM-PAC Raw Score CMS G-Code Modifier Level of Impairment Assistance   6 % Total / Unable   7 - 9 CM 80 - 100% Maximal Assist   10 - 14 CL 60 - 80% Moderate Assist   15 - 19 CK 40 - 60% Moderate Assist   20 - 22 CJ 20 - 40% Minimal Assist   23 CI 1-20% SBA / CGA   24 CH 0% Independent/ Mod I     Patient left up in chair with all lines intact, call button in reach and RN and pt's wife present.    Assessment:  Suman Hayden is a 67 y.o. male with a medical diagnosis of Acute on chronic combined systolic and diastolic heart failure and presents s/p LVAD insertion 7/27. Pt progressing functional mobility as he was able to transfer to bedside chair this date. Pt tolerated session well with good effort and  no complaints of pain or SOB. Further (I) with mobility remains limited 2* impaired endurance, weakness, and decreased balance. Pt would continue to benefit from skilled acute PT in order to address current deficits and progress functional mobility.     Rehab identified problem list/impairments: Rehab identified problem list/impairments: weakness, impaired functional mobilty, gait instability, impaired endurance, impaired balance, impaired self care skills, decreased coordination, decreased safety awareness, impaired cardiopulmonary response to activity    Rehab potential is good.    Activity tolerance: Good    Discharge recommendations: Discharge Facility/Level Of Care Needs: home with home health     Barriers to discharge: Barriers to Discharge: None    Equipment recommendations: Equipment Needed After Discharge:  (TBD)     GOALS:    Physical Therapy Goals        Problem: Physical Therapy Goal    Goal Priority Disciplines Outcome Goal Variances Interventions   Physical Therapy Goal     PT/OT, PT Ongoing (interventions implemented as appropriate)     Description:  Goals to be met by: 17     Patient will increase functional independence with mobility by performin. Supine to sit with MInimal Assistance  2. Sit to supine with MInimal Assistance  3. Sit to stand transfer with Minimal Assistance  4. Bed to chair transfer with Minimal Assistance  5. Gait  x 50 feet with Minimal Assistance.   6. Lower extremity exercise program x15 reps, with supervision, in order to increase LE strength and (I) with functional mobility.                       PLAN:    Patient to be seen 6 x/week  to address the above listed problems via gait training, therapeutic activities, therapeutic exercises  Plan of Care expires: 17  Plan of Care reviewed with: patient, spouse        Kely Brennon, PT, DPT   2017  [phone]

## 2017-07-30 NOTE — PLAN OF CARE
Problem: Occupational Therapy Goal  Goal: Occupational Therapy Goal  Goals to be met by:  2 weeks 8/12/17     Patient will increase functional independence with ADLs by performing:  Feeding: Independent   UE Dressing with Supervision.  LE Dressing with Supervision.  Grooming while standing with Supervision.  Toileting from toilet with Supervision for hygiene and clothing management.   Stand pivot transfers with Supervision.  Toilet transfer to toilet with Supervision.  Pt will be independent with LVAD yasmin't.      Goals remain appropriate.

## 2017-07-30 NOTE — PLAN OF CARE
Problem: Physical Therapy Goal  Goal: Physical Therapy Goal  Goals to be met by: 17     Patient will increase functional independence with mobility by performin. Supine to sit with MInimal Assistance  2. Sit to supine with MInimal Assistance  3. Sit to stand transfer with Minimal Assistance  4. Bed to chair transfer with Minimal Assistance  5. Gait  x 50 feet with Minimal Assistance.   6. Lower extremity exercise program x15 reps, with supervision, in order to increase LE strength and (I) with functional mobility.      Outcome: Ongoing (interventions implemented as appropriate)  Goals reviewed and remain appropriate. Pt progressing towards goals.    Kely Willett, PT, DPT   2017  [phone]

## 2017-07-30 NOTE — PT/OT/SLP PROGRESS
"Occupational Therapy  Treatment    Suman Hayden   MRN: [id number]   Admitting Diagnosis: Acute on chronic combined systolic and diastolic heart failure    OT Date of Treatment: 07/30/17   OT Start Time: 0830  OT Stop Time: 0900  OT Total Time (min): 30 min    Billable Minutes:  Therapeutic Activity 24    General Precautions: Standard, LVAD, fall, sternal, Men[place] Carmen catheter (PA cordis)  Orthopedic Precautions: N/A    Subjective:  Communicated with nsg prior to session.  "I'm ready" pt states upon OT arrival.     Pain/Comfort  Pain Rating 1: 0/10    Objective:   Pt found supine in bed. Menan carmen remains in place. LVAD to wall power and spouse present in room.      Functional Mobility:  Bed Mobility:  Supine to Sit: Moderate Assistance    Transfers:   Sit <> Stand Assistance: Moderate Assistance  Sit <> Stand Assistive Device: No Assistive Device  Bed <> Chair Technique: Stand Pivot  Bed <> Chair Transfer Assistance: Moderate Assistance    Activities of Daily Living:  Feeding Level of Assistance: Set-up Assistance  LE Dressing Level of Assistance: Total assistance    Pt demo Fair to Fair+ sitting balance. Vital signs monitored throughout session which remained stable. Pt able to verbalize sternal precautions for which he was educated yesterday and required MIN vc to follow precautions during functional activity.   Pt with poor recall of LVAD education from last date. OT introduced additional education re: batteries, battery clips and transition to/from battery power. Pt paying attention with good effort, but limited understanding noted. OT provided continued repetition of all education. Spouse present and demo good understanding of education provided.     AM-PAC 6 CLICK ADL   How much help from another person does this patient currently need?   1 = Unable, Total/Dependent Assistance  2 = A lot, Maximum/Moderate Assistance  3 = A little, Minimum/Contact Guard/Supervision  4 = None, Modified " "[place]/Independent    Putting on and taking off regular lower body clothing? : 1  Bathing (including washing, rinsing, drying)?: 1  Toileting, which includes using toilet, bedpan, or urinal? : 1  Putting on and taking off regular upper body clothing?: 3  Taking care of personal grooming such as brushing teeth?: 3  Eating meals?: 3  Total Score: 12     AM-PAC Raw Score CMS "G-Code Modifier Level of Impairment Assistance   6 % Total / Unable   7 - 8 CM 80 - 100% Maximal Assist   9-13 CL 60 - 80% Moderate Assist   14 - 19 CK 40 - 60% Moderate Assist   20 - 22 CJ 20 - 40% Minimal Assist   23 CI 1-20% SBA / CGA   24 CH 0% Independent/ Mod I       Patient left up in chair with all lines intact and nsg and spouse present in room.     ASSESSMENT:  Suman Hayden is a 67 y.o. male with a medical diagnosis of Acute on chronic combined systolic and diastolic heart failure and s/p LVAD placement. Pt tolerated session well with good effort, stable vital signs and good endurance noted. Pt with limited understanding and memory noted with LVAD education . Pt to benefit from cont OT to address stated goals.     Rehab identified problem list/impairments: Rehab identified problem list/impairments: weakness, impaired functional mobilty, gait instability, impaired endurance, impaired balance, impaired self care skills    Rehab potential is good.    Activity tolerance: Good    Discharge recommendations: Discharge Facility/Level Of Care Needs: home with home health       GOALS:    Occupational Therapy Goals        Problem: Occupational Therapy Goal    Goal Priority Disciplines Outcome Interventions   Occupational Therapy Goal     OT, PT/OT     Description:  Goals to be met by:  2 weeks 8/12/17     Patient will increase functional independence with ADLs by performing:  Feeding: Independent   UE Dressing with Supervision.  LE Dressing with Supervision.  Grooming while standing with Supervision.  Toileting from toilet with " Supervision for hygiene and clothing management.   Stand pivot transfers with Supervision.  Toilet transfer to toilet with Supervision.  Pt will be independent with LVAD yasmin't.                 Multidisciplinary Problems (Resolved)        Problem: Occupational Therapy Goal    Goal Priority Disciplines Outcome Interventions   Occupational Therapy Goal   (Resolved)     OT, PT/OT  Error    Description:  Goals to be met by: 8/04/2017    Patient will increase functional independence with ADLs by performing:    Increased functional strength to 5/5 for improved ADL performance.  Upper extremity exercise program and R LE ankle pumps  3x 10 reps per handout, with independence.                      Plan:  Patient to be seen 6 x/week to address the above listed problems via self-care/home management, therapeutic activities, therapeutic exercises  Plan of Care expires: 08/04/17  Plan of Care reviewed with: patient         DELMY Navarro  07/30/2017

## 2017-07-30 NOTE — PROGRESS NOTES
Cardiology Progress Note    SUBJECTIVE:   NAEON. Doing well this morning, up in chair. No events on telemetry. Extubated, breathing well.    OBJECTIVE:     Vital Signs (Most Recent)  Temp: 99.2 °F (37.3 °C) (07/30/17 0700)  Pulse: 100 (07/30/17 1000)  Resp: (!) 29 (07/30/17 1000)  BP: (!) 82/0 (07/29/17 2300)  SpO2: 99 % (07/30/17 0200)    Vital Signs Range (Last 24H):  Temp:  [99.2 °F (37.3 °C)-100.1 °F (37.8 °C)]   Pulse:  []   Resp:  [22-35]   BP: (82)/(0)   SpO2:  [96 %-99 %]   Arterial Line BP: (73-93)/(56-74)     Physical Exam:  GEN: NAD, alert and oriented  HEENT:no JVD  Cv: VAD hum  PULM: CTAB, EWOB  ABD: soft, NT/ND  EXT: no ANTHONY  Neuro: no focal deficits    Laboratory:  Chemistry:  Lab Results   Component Value Date     07/30/2017    K 3.8 07/30/2017     07/30/2017    CO2 21 (L) 07/30/2017    BUN 28 (H) 07/30/2017    CREATININE 1.1 07/30/2017    CALCIUM 8.6 (L) 07/30/2017    BNP 1,184 (H) 07/28/2017     CBC:   Lab Results   Component Value Date    WBC 15.28 (H) 07/30/2017    HGB 8.7 (L) 07/30/2017    HCT 25.3 (L) 07/30/2017    HCT 32 (L) 07/28/2017    MCV 83 07/30/2017    PLT 99 (L) 07/30/2017           ASSESSMENT/PLAN:   67 M with ICM, recent LVAD POD4, Medtronic CRT-D, AF/AT Chadsvasc 3, hx of VT consulted for device dysfunction with non-functioning RV lead.    Plan:  -formal interrogation Medtronic ICD tomorrow  -bridging to coumadin, INR 1.2. Cannot perform while still on heparin due to bleeding risk  -If remains dysfunctional as edema/inflammation secondary to surgery improve, will tentatively plan for RV lead revision vs replacement    Plan discussed with attending Dr. Willett, further recommendations as per attending addendum.    Charbel Chavira MD  Cardiology PGY4  [phone]

## 2017-07-30 NOTE — PROGRESS NOTES
[facility]  Heart Transplant  Progress Note    Patient Name: Suman Hayden  MRN: [id number]  Admission Date: 7/18/2017  Hospital Length of Stay: 12 days  Attending Physician: Sunny Downing MD  Primary Care Provider: Joe Ernst MD  Principal Problem:Acute on chronic combined systolic and diastolic heart failure    Subjective:     Interval History: NAEON. Feels well. Pain has significantly improved. Weaning on drips. Still has 2 chest tubes in. Noted to be febrile yesterday, currently on cefepime.     Continuous Infusions:   sodium chloride 0.9% 10 mL/hr at 07/27/17 1451    amiodarone 0.5 mg/min (07/30/17 1200)    dextrose 5 % and 0.9 % NaCl 10 mL/hr at 07/30/17 0026    DOBUTamine 4 mcg/kg/min (07/30/17 1200)    epinephrine infusion (NON-TITRATING) 0.04 mcg/kg/min (07/30/17 1200)    furosemide (LASIX) 1 mg/mL infusion (non-titrating) 20 mg/hr (07/30/17 1200)    heparin (porcine) in 5 % dex 700 Units/hr (07/30/17 1200)    nicardipine Stopped (07/30/17 0900)    nitric oxide gas      propofol Stopped (07/29/17 0800)    vasopressin (PITRESSIN) infusion Stopped (07/28/17 1100)     Scheduled Meds:   aspirin  325 mg Oral Daily    aspirin  325 mg Per NG tube Daily    ceFEPime (MAXIPIME) IVPB  1 g Intravenous Q8H    chlorothiazide (DIURIL) IVPB  500 mg Intravenous Once    docusate sodium  200 mg Oral QHS    ferrous gluconate  324 mg Oral Daily with breakfast    magnesium oxide  400 mg Oral TID    mupirocin   Nasal BID    pantoprazole  40 mg Oral Daily    pantoprazole  40 mg Intravenous Daily    potassium chloride  20 mEq Oral BID    pravastatin  20 mg Oral QHS    sodium chloride 0.9%  3 mL Intravenous Q8H    warfarin  2 mg Oral Once     PRN Meds:sodium chloride, sodium chloride, albumin human 5%, albuterol sulfate, bisacodyl, dextrose 50%, fentaNYL, glucagon (human recombinant), insulin aspart, magnesium hydroxide 400 mg/5 ml, magnesium sulfate IVPB, oxycodone, oxycodone,  potassium chloride, potassium chloride, sodium phosphate IVPB, sodium phosphate IVPB, sodium phosphate IVPB    Review of patient's allergies indicates:  No Known Allergies  Objective:     Vital Signs (Most Recent):  Temp: 99 °F (37.2 °C) (07/30/17 1100)  Pulse: 97 (07/30/17 1200)  Resp: (!) 30 (07/30/17 1200)  BP: (!) 82/0 (07/29/17 2300)  SpO2: 99 % (07/30/17 0200) Vital Signs (24h Range):  Temp:  [99 °F (37.2 °C)-99.8 °F (37.7 °C)] 99 °F (37.2 °C)  Pulse:  [] 97  Resp:  [22-35] 30  SpO2:  [96 %-99 %] 99 %  BP: (82)/(0) 82/0  Arterial Line BP: (73-93)/(54-74) 84/66     Weight: 84.2 kg (185 lb 10 oz)  Body mass index is 28.22 kg/m².      Intake/Output Summary (Last 24 hours) at 07/30/17 1216  Last data filed at 07/30/17 1200   Gross per 24 hour   Intake             1636 ml   Output             2779 ml   Net            -1143 ml       Hemodynamic Parameters:  PAP: (33-61)/(9-27) 41/15  PAP (Mean):  [19 mmHg-38 mmHg] 25 mmHg  CO:  [4.6 L/min-6.4 L/min] 4.8 L/min  CI:  [2.3 L/min/m2-2.9 L/min/m2] 2.4 L/min/m2    Telemetry: no events    Physical Exam  Constitutional: NAD, conversant  HEENT: Sclera anicteric, PERRLA, EOMI  Neck: Positive JVD, no carotid bruits  CV: RRR, no murmur, normal S1/S2, +S3, No Pericardial rub  Pulm: CTAB with no wheezes, rales, or rhonchi, two chest tubes in place  GI: Abdomen soft, NTND, +BS  Extremities: No LE edema, warm and well perfused, No cyanosis, No clubbing  Skin: No ecchymosis, erythema, or ulcers  Psych: AOx3, appropriate affect  Neuro: CNII-XII intact, no focal deficits       Significant Labs:  CBC:    Recent Labs  Lab 07/30/17  0400   WBC 15.28*   RBC 3.04*   HGB 8.7*   HCT 25.3*   PLT 99*   MCV 83   MCH 28.6   MCHC 34.4     BNP:    Recent Labs  Lab 07/28/17  0357   BNP 1,184*     CMP:    Recent Labs  Lab 07/28/17  0357  07/30/17  0600   *  < > 123*   CALCIUM 9.0  < > 8.6*   ALBUMIN 4.1  --   --    PROT 6.3  --   --    *  < > 136   K 3.8  < > 3.8   CO2 21*  < >  21*     < > 103   BUN 26*  < > 28*   CREATININE 1.3  < > 1.1   ALKPHOS 32*  --   --    ALT 18  --   --    AST 99*  --   --    BILITOT 2.6*  --   --    < > = values in this interval not displayed.   Coagulation:     Recent Labs  Lab 07/30/17  0600   INR 1.2   APTT 32.6*     LDH:    Recent Labs  Lab 07/28/17  1155 07/29/17  0400 07/30/17  0400   * 429* 507*     Microbiology:  Microbiology Results (last 7 days)     Procedure Component Value Units Date/Time    Blood culture [[id number]] Collected:  07/24/17 1300    Order Status:  Completed Specimen:  Blood from Peripheral, Hand, Right Updated:  07/29/17 1812     Blood Culture, Routine No growth after 5 days.    Urine culture [[id number]]  (Susceptibility) Collected:  07/24/17 1139    Order Status:  Completed Specimen:  Urine from Urine, Clean Catch Updated:  07/29/17 1506     Urine Culture, Routine --     ENTEROCOCCUS FAECALIS  >100,000 cfu/ml  No other significant isolate      Blood culture [[id number]] Collected:  07/24/17 1100    Order Status:  Completed Specimen:  Blood from Peripheral, Antecubital, Left Updated:  07/29/17 1412     Blood Culture, Routine No growth after 5 days.          I have reviewed all pertinent labs within the past 24 hours.    Estimated Creatinine Clearance: 68.9 mL/min (based on Cr of 1.1).    Diagnostic Results:  I have reviewed and interpreted all pertinent imaging results/findings within the past 24 hours.    Assessment and Plan:     LVAD (left ventricular assist device) present    - LVAD HM III  - Speed 5100  - no events  - LDH increased to 507. Monitor daily  - INR 1.2  - on heparin gtt        * Acute on chronic combined systolic and diastolic heart failure    - CTS Primary  - s/p LVAD HM III (7/27/17)  - chest closure (7/28/17)  - extubated (7/29/17)  - CVP 10, SVO2 53%  - on  4, epi 0.05, lasix 20  - off cardene, vasopressin gtt  - NO decreased to 5PPM            AICD discharge    - appropriate in the setting of VT  aggravated by underlying AT/AFL (earlier during the admission)  - 7/28 - setting of AF undersense - device reprogrammed to VVI 80  - tachy therapy turned off  - on amio gtt  - EP to reassess device in AM - if issues remain, will schedule for lead revision        V-tach    - s/p amiodarone reload - now on amio gtt        Atrial tachycardia    - [id number] - 3  - c/w amiodarone  - c/w heparin gtt        INDIRA (acute kidney injury)    - pre-renal due to hypervolemia  - c/w lasix gtt  - improving  - avoid nephrotoxic agents        Hepatitis B core antibody positive since 2012    - will defer liver biopsy as CTS not requiring it  - ID consult cleared the patient for sx  - case discussed with hepatology, low suspicion for liver involvement        Atrial fibrillation    - c/w heparin gtt  - c/w amio gtt        Urine culture positive    - patient experiencing fevers, leukocytosis  - UA - pyuria, bacteruria  - Ucx + enterococci  - IV cefepime        Hyperlipidemia    - c/w pravastatin            Lorena Payton MD  Heart Transplant  [facility]

## 2017-07-30 NOTE — PROGRESS NOTES
Dr. Espinoza and Dr. Downing updated on pt status. CVP 7, LVAD flows 3.4-3.6 from 3.8-4.2, PI's in the 6's from the 3's, SvO2 40-42%. MAP in the mid 70s, pt resting comfortably with no complaints. Orders received to decrease lasix gtt to 15mg/hr, give 1unit PRBCs over 4hrs, increase epi to 0.05mcg/kg/min and do not titrate down anymore. Will continue to monitor.

## 2017-07-30 NOTE — PROGRESS NOTES
Daily E and M and VAD Interrogation Note    Reason for Visit:  Patient is seen in follow up for management of:  [] HeartMate II  [] Heartware [] Total artificial heart       [] ECMO           [x] Other - HM III     Interval History:  [x] Interval history unobtainable due to intubation.  The [x] implant/[] explant date was 7/27/17    Events overnight - Patient stable o/n. Closed yesterday. Ventilator and pressor requirements minimal; Cr stable.      Review of Systems:   Unable to obtain     Medications:  Current Facility-Administered Medications   Medication Dose Route Frequency Provider Last Rate Last Dose    0.9%  NaCl infusion (for blood administration)   Intravenous Q24H PRN Edwige Clay MD        0.9%  NaCl infusion (for blood administration)   Intravenous Q24H PRN Sunny Downing MD        0.9%  NaCl infusion   Intravenous Continuous Shayne Espinoza MD 10 mL/hr at 07/27/17 1451      albumin human 5% bottle 500 mL  500 mL Intravenous PRN Shayne Espinoza MD   500 mL at 07/28/17 1755    albuterol nebulizer solution 2.5 mg  2.5 mg Nebulization Q4H PRN Shayne Espinoza MD        amiodarone 360 mg/200 mL (1.8 mg/mL) infusion  0.5 mg/min Intravenous Continuous Shayne Espinoza MD 16.7 mL/hr at 07/30/17 0700 0.5 mg/min at 07/30/17 0700    aspirin EC tablet 325 mg  325 mg Oral Daily Shayne Espinoza MD   325 mg at 07/30/17 0814    aspirin tablet 325 mg  325 mg Per NG tube Daily Shayne Espinoza MD   325 mg at 07/29/17 0827    bisacodyl suppository 10 mg  10 mg Rectal Daily PRN Shayne Espinoza MD        cefepime in dextrose 5 % 1 gram/50 mL IVPB 1 g  1 g Intravenous Q8H Shayne Espinoza  mL/hr at 07/30/17 0815 1 g at 07/30/17 0815    chlorothiazide (DIURIL) 500 mg in dextrose 5 % 50 mL IVPB  500 mg Intravenous Once Shayne Espinoza MD        dextrose 5 % and 0.9 % NaCl infusion   Intravenous Continuous Shayne Espinoza MD 10 mL/hr at 07/30/17 0026      dextrose  50% injection 12.5 g  12.5 g Intravenous PRN Angela Valdez MD        DOBUtamine 1000 mg in D5W 250 mL infusion (premix non-titrating)  4 mcg/kg/min (Dosing Weight) Intravenous Continuous Sunny Downing MD 4.8 mL/hr at 07/30/17 0700 4 mcg/kg/min at 07/30/17 0700    docusate sodium capsule 200 mg  200 mg Oral QHS Shayne Espinoza MD   200 mg at 07/29/17 2035    EPINEPHrine (ADRENALIN) 4 mg in sodium chloride 0.9% 250 mL infusion  0.1 mcg/kg/min (Dosing Weight) Intravenous Continuous Shayne Espinoza MD 15 mL/hr at 07/30/17 0700 0.05 mcg/kg/min at 07/30/17 0700    fentaNYL injection 50 mcg  50 mcg Intravenous Q4H PRN Shayne Espinoza MD   50 mcg at 07/29/17 0827    ferrous gluconate tablet 324 mg  324 mg Oral Daily with breakfast Shayne Espinoza MD   324 mg at 07/30/17 0731    furosemide (LASIX) 250 mg in sodium chloride 0.9 % 250 mL infusion (non-titrating)  20 mg/hr Intravenous Continuous Shayne Espinoza MD 20 mL/hr at 07/30/17 0700 20 mg/hr at 07/30/17 0700    glucagon (human recombinant) injection 1 mg  1 mg Intramuscular PRN Angela Valdez MD        heparin 25,000 units in dextrose 5% 250 mL (100 units/mL) infusion (heparin infusion)  600 Units/hr Intravenous Continuous Shayne Espinoza MD 6 mL/hr at 07/30/17 0737 600 Units/hr at 07/30/17 0737    insulin aspart pen 0-5 Units  0-5 Units Subcutaneous Q4H PRN Angela Valdez MD        magnesium hydroxide 400 mg/5 ml suspension 2,400 mg  30 mL Oral Daily PRN Shayne Espinoza MD        magnesium oxide tablet 400 mg  400 mg Oral TID Shayne Espinoza MD        magnesium sulfate 2g in water 50mL IVPB (premix)  2 g Intravenous PRN Shayne Espinoza MD   2 g at 07/30/17 0731    mupirocin 2 % ointment   Nasal BID Shayne Espinoza MD        niCARdipine 40 mg/200 mL infusion  1 mg/hr Intravenous Continuous Shayne Espinoza MD 12.5 mL/hr at 07/30/17 0700 2.5 mg/hr at 07/30/17 0700    nitric oxide gas Gas 10 ppm  10 ppm Inhalation  Continuous Sunny Downing MD   10 ppm at 07/29/17 1246    oxycodone immediate release tablet 10 mg  10 mg Oral Q4H PRN Shayne Espinoza MD        oxycodone immediate release tablet 5 mg  5 mg Oral Q4H PRN Shayne Espinoza MD   5 mg at 07/30/17 0817    pantoprazole EC tablet 40 mg  40 mg Oral Daily Shayne Espinoza MD   40 mg at 07/30/17 0814    pantoprazole injection 40 mg  40 mg Intravenous Daily Shayne Espinoza MD   40 mg at 07/29/17 0904    potassium chloride 20 mEq in 100 mL IVPB (FOR CENTRAL LINE ADMINISTRATION ONLY)  40 mEq Intravenous PRN Shayne Espinoza MD 50 mL/hr at 07/30/17 0732 40 mEq at 07/30/17 0732    potassium chloride 20 mEq in 100 mL IVPB (FOR CENTRAL LINE ADMINISTRATION ONLY)  40 mEq Intravenous PRN Shayne Espinoza MD        potassium chloride CR capsule 20 mEq  20 mEq Oral BID Shayne Espinoza MD   20 mEq at 07/30/17 0825    pravastatin tablet 20 mg  20 mg Oral QHS Azfar Niurka Payton MD   20 mg at 07/29/17 2035    propofol (DIPRIVAN) 10 mg/mL infusion  5 mcg/kg/min (Dosing Weight) Intravenous Continuous Shayne Espinoza MD   Stopped at 07/29/17 0800    sodium chloride 0.9% flush 3 mL  3 mL Intravenous Q8H Shayne Espinoza MD   3 mL at 07/29/17 2211    sodium phosphate 15 mmol in dextrose 5 % 250 mL IVPB  15 mmol Intravenous PRN Edwige Clay MD 83.3 mL/hr at 07/29/17 2240 15 mmol at 07/29/17 2240    sodium phosphate 20.01 mmol in dextrose 5 % 250 mL IVPB  20.01 mmol Intravenous PRN Edwige Clay MD        sodium phosphate 30 mmol in dextrose 5 % 250 mL IVPB  30 mmol Intravenous PRN Edwige Clay MD        vasopressin (PITRESSIN) 100 Units in dextrose 5 % 100 mL infusion  0.02 Units/min Intravenous Continuous Sunny Downing MD   Stopped at 07/28/17 1100    warfarin (COUMADIN) tablet 2 mg  2 mg Oral Once Shayne Espinoza MD           Physical Examination:  Vital Signs:   Vitals:    07/30/17 0858   BP:    Pulse: 103   Resp: (!) 29    Temp:      Cardiovascular:  [x] Regular rate and rhythm [] Irregular []  None (MATT) []  Other  []  No edema [x]  Edema present  [x]  Clear to auscultation  []  Rales to []  Coarse  []  No rales but   [] Pedal Pulses absent  [x]  Pulses  + throughout  Skin:  Incision is [x]  Clean, dry and intact.  []  Other   Sternum:  []  Stable [x]  Unstable  Driveline(s):   [x]  Clean, dry and intact. []  Other       Labs:  BMP  Lab Results   Component Value Date     07/30/2017    K 3.8 07/30/2017     07/30/2017    CO2 21 (L) 07/30/2017    BUN 28 (H) 07/30/2017    CREATININE 1.1 07/30/2017    CALCIUM 8.6 (L) 07/30/2017    ANIONGAP 12 07/30/2017    ESTGFRAFRICA >60.0 07/30/2017    EGFRNONAA >60.0 07/30/2017       Magnesium   Date Value Ref Range Status   07/30/2017 1.8 1.6 - 2.6 mg/dL Final       Lab Results   Component Value Date    WBC 15.28 (H) 07/30/2017    HGB 8.7 (L) 07/30/2017    HCT 25.3 (L) 07/30/2017    MCV 83 07/30/2017    PLT 99 (L) 07/30/2017       Lab Results   Component Value Date    INR 1.2 07/30/2017    INR 1.4 (H) 07/29/2017    INR 1.4 (H) 07/29/2017       BNP   Date Value Ref Range Status   07/28/2017 1,184 (H) 0 - 99 pg/mL Final     Comment:     Values of less than 100 pg/ml are consistent with non-CHF populations.   07/24/2017 809 (H) 0 - 99 pg/mL Final     Comment:     Values of less than 100 pg/ml are consistent with non-CHF populations.   07/21/2017 912 (H) 0 - 99 pg/mL Final     Comment:     Values of less than 100 pg/ml are consistent with non-CHF populations.       LD   Date Value Ref Range Status   07/30/2017 507 (H) 110 - 260 U/L Final     Comment:     Results are increased in hemolyzed samples.   07/29/2017 429 (H) 110 - 260 U/L Final     Comment:     Results are increased in hemolyzed samples.   07/28/2017 388 (H) 110 - 260 U/L Final     Comment:     Results are increased in hemolyzed samples.       X-Rays:  [x]  I reviewed today's Chest x-ray    Procedure:  Device Interrogation  including analysis of device parameters.  Current Settings   [x]  Ventricular Assist Device  []  Total Artificial Heart interrogated  Review of device function is [x]  Stable []  Other   TXP LVAD INTERROGATIONS 7/30/2017 7/30/2017 7/30/2017 7/29/2017 7/29/2017 7/29/2017 7/29/2017   Type HeartMate3 HeartMate3 HeartMate3 HeartMate3 HeartMate3 HeartMate3 HeartMate3   Flow 4 4.1 4.2 3.9 3.9 4.0 4.1   Speed 5100 5100 5100 5100 5100 5100 5100   PI 3.2 3.1 3.4 4.1 3.6 3.7 3.1   Power ([place]) 3.4 3.3 3.4 3.4 3.4 3.1 3.5   LSL - 4900 - - - - -   Pulsatility - Intermittent pulse - - - - -       Assessment:  [x]  Primary Cardiomyopathy []  Congestive Heart Failure   []  Atrial Fibrillation []  Ventricular Tachycardia   []  Aftercare cardiac device []  Long term (current) use of anticoagulants   []  Ventilator-associated pneumonia []  Pneumonia viral, unspecified   []  Pneumonia, bacterial, unspecified []  Pneumonia, organism unspecified   []  Hemorrhage of GI tract, unspecified    []  Nosebleed []  Driveline infection   []  Infection VAD device []  New onset of    []        Plan:  [x]  Interval history obtained from ICU attending team member during rounding today  []  VAD/MATT teaching performed with patient  []  Mobilization / Physical Therapy ongoing  []  Anticoagulation []  Ongoing []  Held  []  Studies ordered  [x]   To OR today for closure.       Total time spent was 30 minutes.  Of which more than 50 percent of the care dominated counseling and coordinating care with different team members. The VAD was interrogated and all parameters were WNL and no significant findings were found in the history. All these findings are documented in the note above.    Neuro:  - Sedated, intubated.   - Wean sedation   - Continue current pain control regimen    Resp:  - Intubated  Vent Mode: Spont  Oxygen Concentration (%):  [50] 50  Resp Rate Total:  [20 br/min] 20 br/min  PEEP/CPAP:  [5 cmH20] 5 cmH20  Pressure Support:  [5 cmH20] 5  cmH20  Mean Airway Pressure:  [8 cmH20] 8 cmH20  - Minimize supplemental O2 requirements  - Chest tubes to remain in place to wall suction    CV:  - HDS; LVAD numbers stable  - Wean pressors as tolerated  - Epi kept at 0.08 overnight, Dobutamine kept at 4   -     Heme:  - Hgb/Hct stable  - Continue to trend  - Coumadin tonight  - Heparin gtt    ID:  - Cefepime postop  - D/C vanc, fluconazole.       Renal:  - Singer in place  - Strict I/Os    FEN/GI:  - NPO  - ADAT to cardiac diet (low sodium, 1500 mL restriction) once extubated  - Replace lytes PRN    Endo:  - Endocrine following, appreciate assistance  - Accuchecks  - Insulin gtt    Dispo:  - Continue ICU care        Shayne Espinoza MD  General Surgery PGY-2  Pager: [phone]

## 2017-07-31 DIAGNOSIS — E78.5 OTHER AND UNSPECIFIED HYPERLIPIDEMIA: ICD-10-CM

## 2017-07-31 DIAGNOSIS — E03.9 UNSPECIFIED HYPOTHYROIDISM: ICD-10-CM

## 2017-07-31 DIAGNOSIS — Z95.811 HEART REPLACED BY HEART ASSIST DEVICE: Primary | ICD-10-CM

## 2017-07-31 PROBLEM — N17.9 AKI (ACUTE KIDNEY INJURY): Status: RESOLVED | Noted: 2017-07-19 | Resolved: 2017-07-31

## 2017-07-31 LAB
ABO + RH BLD: NORMAL
ALBUMIN SERPL BCP-MCNC: 3.1 G/DL
ALLENS TEST: ABNORMAL
ALP SERPL-CCNC: 67 U/L
ALT SERPL W/O P-5'-P-CCNC: 32 U/L
ANION GAP SERPL CALC-SCNC: 10 MMOL/L
ANION GAP SERPL CALC-SCNC: 11 MMOL/L
ANION GAP SERPL CALC-SCNC: 11 MMOL/L
ANION GAP SERPL CALC-SCNC: 12 MMOL/L
APTT BLDCRRT: 40.8 SEC
APTT BLDCRRT: 44.4 SEC
APTT BLDCRRT: 44.7 SEC
APTT BLDCRRT: 46 SEC
AST SERPL-CCNC: 43 U/L
BASOPHILS # BLD AUTO: 0.01 K/UL
BASOPHILS NFR BLD: 0.1 %
BILIRUB DIRECT SERPL-MCNC: 1.6 MG/DL
BILIRUB SERPL-MCNC: 2.2 MG/DL
BLD GP AB SCN CELLS X3 SERPL QL: NORMAL
BLD PROD TYP BPU: NORMAL
BLOOD UNIT EXPIRATION DATE: NORMAL
BLOOD UNIT TYPE CODE: 5100
BLOOD UNIT TYPE: NORMAL
BNP SERPL-MCNC: 1389 PG/ML
BUN SERPL-MCNC: 30 MG/DL
BUN SERPL-MCNC: 31 MG/DL
BUN SERPL-MCNC: 34 MG/DL
BUN SERPL-MCNC: 35 MG/DL
CALCIUM SERPL-MCNC: 8.4 MG/DL
CALCIUM SERPL-MCNC: 8.4 MG/DL
CALCIUM SERPL-MCNC: 8.5 MG/DL
CALCIUM SERPL-MCNC: 8.5 MG/DL
CHLORIDE SERPL-SCNC: 101 MMOL/L
CHLORIDE SERPL-SCNC: 102 MMOL/L
CHLORIDE SERPL-SCNC: 102 MMOL/L
CHLORIDE SERPL-SCNC: 103 MMOL/L
CO2 SERPL-SCNC: 21 MMOL/L
CO2 SERPL-SCNC: 21 MMOL/L
CO2 SERPL-SCNC: 22 MMOL/L
CO2 SERPL-SCNC: 23 MMOL/L
CODING SYSTEM: NORMAL
CREAT SERPL-MCNC: 1.1 MG/DL
CREAT SERPL-MCNC: 1.1 MG/DL
CREAT SERPL-MCNC: 1.3 MG/DL
CREAT SERPL-MCNC: 1.3 MG/DL
CRP SERPL-MCNC: 237.8 MG/L
DELSYS: ABNORMAL
DIFFERENTIAL METHOD: ABNORMAL
DISPENSE STATUS: NORMAL
EOSINOPHIL # BLD AUTO: 0.2 K/UL
EOSINOPHIL NFR BLD: 1.3 %
ERYTHROCYTE [DISTWIDTH] IN BLOOD BY AUTOMATED COUNT: 15.5 %
ERYTHROCYTE [SEDIMENTATION RATE] IN BLOOD BY WESTERGREN METHOD: 21 MM/H
ERYTHROCYTE [SEDIMENTATION RATE] IN BLOOD BY WESTERGREN METHOD: 25 MM/H
ERYTHROCYTE [SEDIMENTATION RATE] IN BLOOD BY WESTERGREN METHOD: 25 MM/H
EST. GFR  (AFRICAN AMERICAN): >60 ML/MIN/1.73 M^2
EST. GFR  (NON AFRICAN AMERICAN): 56.5 ML/MIN/1.73 M^2
EST. GFR  (NON AFRICAN AMERICAN): 56.5 ML/MIN/1.73 M^2
EST. GFR  (NON AFRICAN AMERICAN): >60 ML/MIN/1.73 M^2
EST. GFR  (NON AFRICAN AMERICAN): >60 ML/MIN/1.73 M^2
FIO2: 32
FLOW: 3
GLUCOSE SERPL-MCNC: 126 MG/DL
GLUCOSE SERPL-MCNC: 129 MG/DL
GLUCOSE SERPL-MCNC: 134 MG/DL
GLUCOSE SERPL-MCNC: 139 MG/DL
GLUCOSE SERPL-MCNC: 204 MG/DL (ref 70–110)
HCO3 UR-SCNC: 22.6 MMOL/L (ref 24–28)
HCO3 UR-SCNC: 22.8 MMOL/L (ref 24–28)
HCO3 UR-SCNC: 23.5 MMOL/L (ref 24–28)
HCO3 UR-SCNC: 24.9 MMOL/L (ref 24–28)
HCT VFR BLD AUTO: 28 %
HCT VFR BLD CALC: 25 %PCV (ref 36–54)
HGB BLD-MCNC: 9.8 G/DL
INR PPP: 1.1
LDH SERPL L TO P-CCNC: 0.79 MMOL/L (ref 0.36–1.25)
LDH SERPL L TO P-CCNC: 380 U/L
LYMPHOCYTES # BLD AUTO: 1.3 K/UL
LYMPHOCYTES NFR BLD: 9.4 %
MAGNESIUM SERPL-MCNC: 2.2 MG/DL
MAGNESIUM SERPL-MCNC: 2.2 MG/DL
MAGNESIUM SERPL-MCNC: 2.3 MG/DL
MAGNESIUM SERPL-MCNC: 2.3 MG/DL
MCH RBC QN AUTO: 28.5 PG
MCHC RBC AUTO-ENTMCNC: 35 G/DL
MCV RBC AUTO: 81 FL
MODE: ABNORMAL
MONOCYTES # BLD AUTO: 1.3 K/UL
MONOCYTES NFR BLD: 9.6 %
NEUTROPHILS # BLD AUTO: 11 K/UL
NEUTROPHILS NFR BLD: 79.6 %
PCO2 BLDA: 34.6 MMHG (ref 35–45)
PCO2 BLDA: 34.9 MMHG (ref 35–45)
PCO2 BLDA: 36.8 MMHG (ref 35–45)
PCO2 BLDA: 40.3 MMHG (ref 35–45)
PH SMN: 7.4 [PH] (ref 7.35–7.45)
PH SMN: 7.41 [PH] (ref 7.35–7.45)
PH SMN: 7.42 [PH] (ref 7.35–7.45)
PH SMN: 7.42 [PH] (ref 7.35–7.45)
PHOSPHATE SERPL-MCNC: 2.6 MG/DL
PLATELET # BLD AUTO: 138 K/UL
PLATELET BLD QL SMEAR: ABNORMAL
PMV BLD AUTO: 11 FL
PO2 BLDA: 32 MMHG (ref 40–60)
PO2 BLDA: 380 MMHG (ref 80–100)
PO2 BLDA: 90 MMHG (ref 80–100)
PO2 BLDA: 96 MMHG (ref 80–100)
POC BE: -1 MMOL/L
POC BE: -2 MMOL/L
POC BE: -2 MMOL/L
POC BE: 0 MMOL/L
POC IONIZED CALCIUM: 1.1 MMOL/L (ref 1.06–1.42)
POC SATURATED O2: 100 % (ref 95–100)
POC SATURATED O2: 62 % (ref 95–100)
POC SATURATED O2: 97 % (ref 95–100)
POC SATURATED O2: 98 % (ref 95–100)
POC TCO2: 24 MMOL/L (ref 23–27)
POC TCO2: 24 MMOL/L (ref 23–27)
POC TCO2: 25 MMOL/L (ref 23–27)
POC TCO2: 26 MMOL/L (ref 24–29)
POCT GLUCOSE: 117 MG/DL (ref 70–110)
POCT GLUCOSE: 153 MG/DL (ref 70–110)
POCT GLUCOSE: 158 MG/DL (ref 70–110)
POLYCHROMASIA BLD QL SMEAR: ABNORMAL
POTASSIUM BLD-SCNC: 3.8 MMOL/L (ref 3.5–5.1)
POTASSIUM SERPL-SCNC: 4 MMOL/L
POTASSIUM SERPL-SCNC: 4.1 MMOL/L
PROT SERPL-MCNC: 6.4 G/DL
PROTHROMBIN TIME: 11.4 SEC
RBC # BLD AUTO: 3.44 M/UL
SAMPLE: ABNORMAL
SITE: ABNORMAL
SODIUM BLD-SCNC: 135 MMOL/L (ref 136–145)
SODIUM SERPL-SCNC: 133 MMOL/L
SODIUM SERPL-SCNC: 135 MMOL/L
SODIUM SERPL-SCNC: 135 MMOL/L
SODIUM SERPL-SCNC: 136 MMOL/L
SP02: 97
SP02: 97
TRANS ERYTHROCYTES VOL PATIENT: NORMAL ML
WBC # BLD AUTO: 13.9 K/UL

## 2017-07-31 PROCEDURE — 93750 INTERROGATION VAD IN PERSON: CPT | Performed by: THORACIC SURGERY (CARDIOTHORACIC VASCULAR SURGERY)

## 2017-07-31 PROCEDURE — 25000003 PHARM REV CODE 250: Performed by: STUDENT IN AN ORGANIZED HEALTH CARE EDUCATION/TRAINING PROGRAM

## 2017-07-31 PROCEDURE — 80048 BASIC METABOLIC PNL TOTAL CA: CPT | Mod: 91

## 2017-07-31 PROCEDURE — 99233 SBSQ HOSP IP/OBS HIGH 50: CPT | Mod: ,,, | Performed by: ANESTHESIOLOGY

## 2017-07-31 PROCEDURE — 37799 UNLISTED PX VASCULAR SURGERY: CPT

## 2017-07-31 PROCEDURE — 83605 ASSAY OF LACTIC ACID: CPT

## 2017-07-31 PROCEDURE — 85730 THROMBOPLASTIN TIME PARTIAL: CPT | Mod: 91

## 2017-07-31 PROCEDURE — 82803 BLOOD GASES ANY COMBINATION: CPT

## 2017-07-31 PROCEDURE — 85610 PROTHROMBIN TIME: CPT

## 2017-07-31 PROCEDURE — 97530 THERAPEUTIC ACTIVITIES: CPT

## 2017-07-31 PROCEDURE — 27000248 HC VAD-ADDITIONAL DAY

## 2017-07-31 PROCEDURE — 93284 PRGRMG EVAL IMPLANTABLE DFB: CPT

## 2017-07-31 PROCEDURE — 86140 C-REACTIVE PROTEIN: CPT

## 2017-07-31 PROCEDURE — 80076 HEPATIC FUNCTION PANEL: CPT

## 2017-07-31 PROCEDURE — 63600175 PHARM REV CODE 636 W HCPCS: Performed by: STUDENT IN AN ORGANIZED HEALTH CARE EDUCATION/TRAINING PROGRAM

## 2017-07-31 PROCEDURE — 27000221 HC OXYGEN, UP TO 24 HOURS

## 2017-07-31 PROCEDURE — 99233 SBSQ HOSP IP/OBS HIGH 50: CPT | Mod: 24,,, | Performed by: THORACIC SURGERY (CARDIOTHORACIC VASCULAR SURGERY)

## 2017-07-31 PROCEDURE — 63600367 HC NITRIC OXIDE PER HOUR

## 2017-07-31 PROCEDURE — 20000000 HC ICU ROOM

## 2017-07-31 PROCEDURE — 80048 BASIC METABOLIC PNL TOTAL CA: CPT

## 2017-07-31 PROCEDURE — 92526 ORAL FUNCTION THERAPY: CPT

## 2017-07-31 PROCEDURE — 93750 INTERROGATION VAD IN PERSON: CPT | Mod: ,,, | Performed by: THORACIC SURGERY (CARDIOTHORACIC VASCULAR SURGERY)

## 2017-07-31 PROCEDURE — 83735 ASSAY OF MAGNESIUM: CPT | Mod: 91

## 2017-07-31 PROCEDURE — 63600175 PHARM REV CODE 636 W HCPCS: Performed by: THORACIC SURGERY (CARDIOTHORACIC VASCULAR SURGERY)

## 2017-07-31 PROCEDURE — 99231 SBSQ HOSP IP/OBS SF/LOW 25: CPT | Mod: ,,, | Performed by: NURSE PRACTITIONER

## 2017-07-31 PROCEDURE — 97110 THERAPEUTIC EXERCISES: CPT

## 2017-07-31 PROCEDURE — 25000003 PHARM REV CODE 250: Performed by: THORACIC SURGERY (CARDIOTHORACIC VASCULAR SURGERY)

## 2017-07-31 PROCEDURE — 83050 HGB METHEMOGLOBIN QUAN: CPT

## 2017-07-31 PROCEDURE — A4216 STERILE WATER/SALINE, 10 ML: HCPCS | Performed by: STUDENT IN AN ORGANIZED HEALTH CARE EDUCATION/TRAINING PROGRAM

## 2017-07-31 PROCEDURE — 94799 UNLISTED PULMONARY SVC/PX: CPT

## 2017-07-31 PROCEDURE — 93750 INTERROGATION VAD IN PERSON: CPT | Mod: ,,, | Performed by: INTERNAL MEDICINE

## 2017-07-31 PROCEDURE — 83735 ASSAY OF MAGNESIUM: CPT

## 2017-07-31 PROCEDURE — 85025 COMPLETE CBC W/AUTO DIFF WBC: CPT

## 2017-07-31 PROCEDURE — 84100 ASSAY OF PHOSPHORUS: CPT

## 2017-07-31 PROCEDURE — 99233 SBSQ HOSP IP/OBS HIGH 50: CPT | Mod: ,,, | Performed by: INTERNAL MEDICINE

## 2017-07-31 PROCEDURE — 93284 PRGRMG EVAL IMPLANTABLE DFB: CPT | Mod: 26,,, | Performed by: INTERNAL MEDICINE

## 2017-07-31 RX ORDER — AMIODARONE HYDROCHLORIDE 200 MG/1
400 TABLET ORAL 2 TIMES DAILY
Status: DISCONTINUED | OUTPATIENT
Start: 2017-07-31 | End: 2017-08-04

## 2017-07-31 RX ORDER — FUROSEMIDE 10 MG/ML
10 INJECTION INTRAMUSCULAR; INTRAVENOUS ONCE
Status: COMPLETED | OUTPATIENT
Start: 2017-07-31 | End: 2017-07-31

## 2017-07-31 RX ORDER — WARFARIN 2 MG/1
4 TABLET ORAL ONCE
Status: COMPLETED | OUTPATIENT
Start: 2017-07-31 | End: 2017-07-31

## 2017-07-31 RX ORDER — POLYETHYLENE GLYCOL 3350 17 G/17G
17 POWDER, FOR SOLUTION ORAL 2 TIMES DAILY
Status: DISCONTINUED | OUTPATIENT
Start: 2017-07-31 | End: 2017-08-04

## 2017-07-31 RX ADMIN — MAGNESIUM OXIDE TAB 400 MG (241.3 MG ELEMENTAL MG) 400 MG: 400 (241.3 MG) TAB at 09:07

## 2017-07-31 RX ADMIN — ONDANSETRON 4 MG: 2 INJECTION INTRAMUSCULAR; INTRAVENOUS at 02:07

## 2017-07-31 RX ADMIN — POTASSIUM CHLORIDE 20 MEQ: 750 CAPSULE, EXTENDED RELEASE ORAL at 09:07

## 2017-07-31 RX ADMIN — HEPARIN SODIUM AND DEXTROSE 1100 UNITS/HR: 10000; 5 INJECTION INTRAVENOUS at 11:07

## 2017-07-31 RX ADMIN — Medication 3 ML: at 10:07

## 2017-07-31 RX ADMIN — POLYETHYLENE GLYCOL 3350 17 G: 17 POWDER, FOR SOLUTION ORAL at 10:07

## 2017-07-31 RX ADMIN — POLYETHYLENE GLYCOL 3350 17 G: 17 POWDER, FOR SOLUTION ORAL at 08:07

## 2017-07-31 RX ADMIN — CEFEPIME HYDROCHLORIDE 1 G: 1 INJECTION, SOLUTION INTRAVENOUS at 08:07

## 2017-07-31 RX ADMIN — PRAVASTATIN SODIUM 20 MG: 20 TABLET ORAL at 08:07

## 2017-07-31 RX ADMIN — WARFARIN SODIUM 4 MG: 2 TABLET ORAL at 05:07

## 2017-07-31 RX ADMIN — EPINEPHRINE 0.04 MCG/KG/MIN: 1 INJECTION PARENTERAL at 08:07

## 2017-07-31 RX ADMIN — DOBUTAMINE HYDROCHLORIDE 4 MCG/KG/MIN: 400 INJECTION INTRAVENOUS at 09:07

## 2017-07-31 RX ADMIN — AMIODARONE HYDROCHLORIDE 0.5 MG/MIN: 1.8 INJECTION, SOLUTION INTRAVENOUS at 02:07

## 2017-07-31 RX ADMIN — AMIODARONE HYDROCHLORIDE 0.5 MG/MIN: 1.8 INJECTION, SOLUTION INTRAVENOUS at 11:07

## 2017-07-31 RX ADMIN — NICARDIPINE HYDROCHLORIDE 5 MG/HR: 0.2 INJECTION, SOLUTION INTRAVENOUS at 07:07

## 2017-07-31 RX ADMIN — FUROSEMIDE 15 MG/HR: 10 INJECTION, SOLUTION INTRAMUSCULAR; INTRAVENOUS at 08:07

## 2017-07-31 RX ADMIN — NICARDIPINE HYDROCHLORIDE 5 MG/HR: 0.2 INJECTION, SOLUTION INTRAVENOUS at 02:07

## 2017-07-31 RX ADMIN — FUROSEMIDE 10 MG: 10 INJECTION, SOLUTION INTRAMUSCULAR; INTRAVENOUS at 09:07

## 2017-07-31 RX ADMIN — POTASSIUM CHLORIDE 20 MEQ: 750 CAPSULE, EXTENDED RELEASE ORAL at 08:07

## 2017-07-31 RX ADMIN — EPINEPHRINE 0.03 MCG/KG/MIN: 1 INJECTION PARENTERAL at 11:07

## 2017-07-31 RX ADMIN — CHLOROTHIAZIDE SODIUM 500 MG: 500 INJECTION, POWDER, LYOPHILIZED, FOR SOLUTION INTRAVENOUS at 11:07

## 2017-07-31 RX ADMIN — DOCUSATE SODIUM 200 MG: 100 CAPSULE, LIQUID FILLED ORAL at 08:07

## 2017-07-31 RX ADMIN — CEFEPIME HYDROCHLORIDE 1 G: 1 INJECTION, SOLUTION INTRAVENOUS at 12:07

## 2017-07-31 RX ADMIN — MUPIROCIN: 20 OINTMENT TOPICAL at 08:07

## 2017-07-31 RX ADMIN — FUROSEMIDE 25 MG/HR: 10 INJECTION, SOLUTION INTRAMUSCULAR; INTRAVENOUS at 09:07

## 2017-07-31 RX ADMIN — NICARDIPINE HYDROCHLORIDE 5 MG/HR: 0.2 INJECTION, SOLUTION INTRAVENOUS at 11:07

## 2017-07-31 RX ADMIN — MUPIROCIN: 20 OINTMENT TOPICAL at 09:07

## 2017-07-31 RX ADMIN — AMIODARONE HYDROCHLORIDE 400 MG: 200 TABLET ORAL at 08:07

## 2017-07-31 RX ADMIN — BISACODYL 10 MG: 10 SUPPOSITORY RECTAL at 05:07

## 2017-07-31 RX ADMIN — PANTOPRAZOLE SODIUM 40 MG: 40 TABLET, DELAYED RELEASE ORAL at 08:07

## 2017-07-31 RX ADMIN — ONDANSETRON 4 MG: 2 INJECTION INTRAMUSCULAR; INTRAVENOUS at 10:07

## 2017-07-31 RX ADMIN — ASPIRIN 325 MG: 325 TABLET, DELAYED RELEASE ORAL at 08:07

## 2017-07-31 RX ADMIN — Medication 3 ML: at 02:07

## 2017-07-31 RX ADMIN — AMIODARONE HYDROCHLORIDE 400 MG: 200 TABLET ORAL at 10:07

## 2017-07-31 NOTE — PLAN OF CARE
Problem: SLP Goal  Goal: SLP Goal  Speech Language Pathology Goals  Goals expected to be met by 8/4:  1. Pt will tolerate a dental soft diet and thin liquids without s/s of aspiration.        Pt with limited po bolus acceptance this am due to nausea.  No overt s/s of aspiration observed.  Continue current  plan of care and aspiration precautions .     SHERRI Diane, CCC-SLP  Speech Language Pathologist  [phone]  7/31/2017

## 2017-07-31 NOTE — PLAN OF CARE
Problem: Patient Care Overview  Goal: Plan of Care Review  Outcome: Ongoing (interventions implemented as appropriate)  LVAD functioning without difficulty today (5100 RPM). Pt denies c/o pain or distress today; however, pt w/ intermittent nausea and one episode of emesis this afternoon.  Nausea effectively relieved by Zofran IV administration. Nitric Oxide at 4 PPM. Epinephrine decreased to 0.03 mcg/kg/min IV, and pt tolerates well.  Dobutamine at 4 mcg/kg/min IV.  Amiodarone oral dosing administered today, and Amiodarone IV infusion discontinued six hours after oral dosing per MD orders.  Lasix at 15 mg/hr IV, with adequate amounts of clear yellow urine produced.  Cardene IV titrated to maintain ordered MAP parameters, currently infusing at 5 mg/hr IV.  Heparin IV infusing at 1100 units/hr IV (paused this AM for planned discontinuation of R chest tube, then restarted one hour after chest tube removal per MD orders).  Labs sent per orders. PT and OT present to bedside, and two person assist provided to get pt OOB to chair this morning.  Pt returned to bed w/ assistance from OT prior to noontime as per Dr. Downing's instructions.  Plan of care reviewed with pt and pt's wife, but reinforcement of education required.  Emotional support offered.

## 2017-07-31 NOTE — PROGRESS NOTES
"[facility]  Endocrinology  Progress Note    Admit Date: 2017     Reason for Consult: Management of  Hyperglycemia     Surgical Procedure and Date: 17 s/p LAVD     HPI: 67 y.o. gentleman with PNICM (EF 10%) on home  at 5 mcg/kg/min, esophagitis, HTN, HLD, s/p [employer] CRT-D,SOB (NYHA Class III), 3 pillow orthopnea. He is now s/p LVAD placement and endocrine in consulted for bg management.     Interval HPI:   Overnight events: BG at goal with no insulin needs.   Eatin% on dental soft diet   Nausea: No  Hypoglycemia and intervention: No  Fever: No  TPN and/or TF: No  BP (!) 80/0 (BP Location: Right arm, Patient Position: Lying, BP Method: Doppler)   Pulse 97   Temp 98 °F (36.7 °C) (Core ([place]))   Resp (!) 32   Ht 5' 8" (1.727 m)   Wt 84.2 kg (185 lb 10 oz)   SpO2 98%   BMI 28.22 kg/m²       Labs Reviewed and Include      Recent Labs  Lab 17  0554   *   CALCIUM 8.4*   ALBUMIN 3.1*   PROT 6.4      K 4.0   CO2 21*      BUN 31*   CREATININE 1.1   ALKPHOS 67   ALT 32   AST 43*   BILITOT 2.2*     Lab Results   Component Value Date    WBC 13.90 (H) 2017    HGB 9.8 (L) 2017    HCT 28.0 (L) 2017    MCV 81 (L) 2017     (L) 2017     No results for input(s): TSH, FREET4 in the last 168 hours.  Lab Results   Component Value Date    HGBA1C 5.4 2017       Nutritional status:   Body mass index is 28.22 kg/m².  Lab Results   Component Value Date    ALBUMIN 3.1 (L) 2017    ALBUMIN 4.1 2017    ALBUMIN 3.0 (L) 2017    ALBUMIN 3.0 (L) 2017     Lab Results   Component Value Date    PREALBUMIN 13 (L) 2017    PREALBUMIN 18 (L) 2017       Estimated Creatinine Clearance: 68.9 mL/min (based on Cr of 1.1).    Accu-Checks  Recent Labs      17   0003  17   0303  17   0401  17   0612  17   0754  17   1204  17   2007  17   0014  17   0417  " 07/30/17   0839   POCTGLUCOSE  134*  130*  161*  139*  134*  134*  122*  153*  134*  132*       Current Medications and/or Treatments Impacting Glycemic Control  Immunotherapy:  Immunosuppressants     None        Steroids:   Hormones     None        Pressors:    Autonomic Drugs     Start     Stop Route Frequency Ordered    07/27/17 1353  EPINEPHrine (ADRENALIN) 4 mg in sodium chloride 0.9% 250 mL infusion      -- IV Continuous 07/27/17 1355        Hyperglycemia/Diabetes Medications: Antihyperglycemics     None          ASSESSMENT and PLAN    Hyperglycemia    bg goal 140-180, started diet. BG at goal with no insulin needs. No previous history of diabetes.  D/c BG monitoring.   Endocrine will sign off. Thank you for this consult. Please re-consult if needed.       Discharge planning: anticipating resolution          Nonischemic cardiomyopathy    S/p lvad            * Acute on chronic combined systolic and diastolic heart failure    S/p lvad          CHF (NYHA class IV, ACC/AHA stage D)    S/p lvad          LVAD (left ventricular assist device) present    Per HTS              Mary Light NP  Endocrinology  [facility]

## 2017-07-31 NOTE — SUBJECTIVE & OBJECTIVE
Interval History: patient doing well today, in chair     Continuous Infusions:   sodium chloride 0.9% 10 mL/hr at 07/27/17 1451    amiodarone 0.5 mg/min (07/31/17 1500)    dextrose 5 % and 0.9 % NaCl 10 mL/hr at 07/30/17 0026    DOBUTamine 4 mcg/kg/min (07/31/17 1500)    epinephrine infusion (NON-TITRATING) 0.03 mcg/kg/min (07/31/17 1500)    furosemide (LASIX) 1 mg/mL infusion (non-titrating) 15 mg/hr (07/31/17 1500)    heparin (porcine) in 5 % dex 1,100 Units/hr (07/31/17 1500)    nicardipine 5 mg/hr (07/31/17 1500)    nitric oxide gas       Scheduled Meds:   amiodarone  400 mg Oral BID    aspirin  325 mg Oral Daily    docusate sodium  200 mg Oral QHS    ferrous gluconate  324 mg Oral Daily with breakfast    magnesium oxide  400 mg Oral TID    mupirocin   Nasal BID    pantoprazole  40 mg Oral Daily    polyethylene glycol  17 g Oral BID    potassium chloride  20 mEq Oral BID    pravastatin  20 mg Oral QHS    sodium chloride 0.9%  3 mL Intravenous Q8H    warfarin  4 mg Oral Once     PRN Meds:albumin human 5%, albuterol sulfate, bisacodyl, fentaNYL, magnesium hydroxide 400 mg/5 ml, magnesium sulfate IVPB, ondansetron, oxycodone, oxycodone, potassium chloride, potassium chloride, sodium phosphate IVPB, sodium phosphate IVPB, sodium phosphate IVPB    Review of patient's allergies indicates:  No Known Allergies  Objective:     Vital Signs (Most Recent):  Temp: 98 °F (36.7 °C) (07/31/17 1100)  Pulse: 97 (07/31/17 1519)  Resp: (!) 32 (07/31/17 1519)  BP: (!) 80/0 (07/31/17 0300)  SpO2: 98 % (07/31/17 0722) Vital Signs (24h Range):  Temp:  [98 °F (36.7 °C)-98.9 °F (37.2 °C)] 98 °F (36.7 °C)  Pulse:  [] 97  Resp:  [22-34] 32  SpO2:  [90 %-100 %] 98 %  BP: (80-82)/(0) 80/0  Arterial Line BP: (75-97)/(58-76) 81/62     Weight: 84.2 kg (185 lb 10 oz)  Body mass index is 28.22 kg/m².      Intake/Output Summary (Last 24 hours) at 07/31/17 1542  Last data filed at 07/31/17 1500   Gross per 24 hour    Intake           3770.9 ml   Output             2974 ml   Net            796.9 ml       Hemodynamic Parameters:  PAP: (41-55)/(15-30) 41/17  PAP (Mean):  [26 mmHg-37 mmHg] 26 mmHg  CO:  [4.7 L/min-6.4 L/min] 5.4 L/min  CI:  [2.4 L/min/m2-3.3 L/min/m2] 2.8 L/min/m2      Physical Exam   Constitutional: He appears well-developed and well-nourished. He is sedated.   HENT:   Head: Normocephalic and atraumatic.   Eyes: EOM are normal. Pupils are equal, round, and reactive to light.   Neck: Neck supple. No tracheal deviation present.   Cardiovascular:   VAD hum   Pulmonary/Chest: Effort normal.   VAD hum   Abdominal: Soft. Bowel sounds are decreased.   Neurological:   MARIE intubated and sedated   Skin: Skin is warm and dry.   Midsternum incision open, dressing occlusive and intact    Psychiatric:            Significant Labs:  CBC:    Recent Labs  Lab 07/31/17  0400   WBC 13.90*   RBC 3.44*   HGB 9.8*   HCT 28.0*   *   MCV 81*   MCH 28.5   MCHC 35.0     BNP:    Recent Labs  Lab 07/31/17  0400   BNP 1,389*     CMP:    Recent Labs  Lab 07/31/17  0554   *   CALCIUM 8.4*   ALBUMIN 3.1*   PROT 6.4      K 4.0   CO2 21*      BUN 31*   CREATININE 1.1   ALKPHOS 67   ALT 32   AST 43*   BILITOT 2.2*      Coagulation:     Recent Labs  Lab 07/31/17  0554   INR 1.1   APTT 46.0*     LDH:    Recent Labs  Lab 07/29/17  0400 07/30/17  0400 07/31/17  0400   * 507* 380*     Microbiology:  Microbiology Results (last 7 days)     Procedure Component Value Units Date/Time    Blood culture [[id number]] Collected:  07/24/17 1300    Order Status:  Completed Specimen:  Blood from Peripheral, Hand, Right Updated:  07/29/17 1812     Blood Culture, Routine No growth after 5 days.    Urine culture [[id number]]  (Susceptibility) Collected:  07/24/17 1139    Order Status:  Completed Specimen:  Urine from Urine, Clean Catch Updated:  07/29/17 1506     Urine Culture, Routine --     ENTEROCOCCUS FAECALIS  >100,000 cfu/ml  No  other significant isolate      Blood culture [[id number]] Collected:  07/24/17 1100    Order Status:  Completed Specimen:  Blood from Peripheral, Antecubital, Left Updated:  07/29/17 1412     Blood Culture, Routine No growth after 5 days.          I have reviewed all pertinent labs within the past 24 hours.    Estimated Creatinine Clearance: 68.9 mL/min (based on Cr of 1.1).    Diagnostic Results:  I have reviewed and interpreted all pertinent imaging results/findings within the past 24 hours.

## 2017-07-31 NOTE — ASSESSMENT & PLAN NOTE
bg goal 140-180, started diet. BG at goal with no insulin needs. No previous history of diabetes.  D/c BG monitoring.   Endocrine will sign off. Thank you for this consult. Please re-consult if needed.       Discharge planning: anticipating resolution

## 2017-07-31 NOTE — PROGRESS NOTES
[facility]  Heart Transplant  Progress Note    Patient Name: Suman Hayden  MRN: [id number]  Admission Date: 7/18/2017  Hospital Length of Stay: 13 days  Attending Physician: Sunny Downing MD  Primary Care Provider: Joe Ernst MD  Principal Problem:Acute on chronic combined systolic and diastolic heart failure    Subjective:     Interval History: patient doing well today, in chair     Continuous Infusions:   sodium chloride 0.9% 10 mL/hr at 07/27/17 1451    amiodarone 0.5 mg/min (07/31/17 1500)    dextrose 5 % and 0.9 % NaCl 10 mL/hr at 07/30/17 0026    DOBUTamine 4 mcg/kg/min (07/31/17 1500)    epinephrine infusion (NON-TITRATING) 0.03 mcg/kg/min (07/31/17 1500)    furosemide (LASIX) 1 mg/mL infusion (non-titrating) 15 mg/hr (07/31/17 1500)    heparin (porcine) in 5 % dex 1,100 Units/hr (07/31/17 1500)    nicardipine 5 mg/hr (07/31/17 1500)    nitric oxide gas       Scheduled Meds:   amiodarone  400 mg Oral BID    aspirin  325 mg Oral Daily    docusate sodium  200 mg Oral QHS    ferrous gluconate  324 mg Oral Daily with breakfast    magnesium oxide  400 mg Oral TID    mupirocin   Nasal BID    pantoprazole  40 mg Oral Daily    polyethylene glycol  17 g Oral BID    potassium chloride  20 mEq Oral BID    pravastatin  20 mg Oral QHS    sodium chloride 0.9%  3 mL Intravenous Q8H    warfarin  4 mg Oral Once     PRN Meds:albumin human 5%, albuterol sulfate, bisacodyl, fentaNYL, magnesium hydroxide 400 mg/5 ml, magnesium sulfate IVPB, ondansetron, oxycodone, oxycodone, potassium chloride, potassium chloride, sodium phosphate IVPB, sodium phosphate IVPB, sodium phosphate IVPB    Review of patient's allergies indicates:  No Known Allergies  Objective:     Vital Signs (Most Recent):  Temp: 98 °F (36.7 °C) (07/31/17 1100)  Pulse: 97 (07/31/17 1519)  Resp: (!) 32 (07/31/17 1519)  BP: (!) 80/0 (07/31/17 0300)  SpO2: 98 % (07/31/17 0722) Vital Signs (24h Range):  Temp:  [98 °F (36.7  °C)-98.9 °F (37.2 °C)] 98 °F (36.7 °C)  Pulse:  [] 97  Resp:  [22-34] 32  SpO2:  [90 %-100 %] 98 %  BP: (80-82)/(0) 80/0  Arterial Line BP: (75-97)/(58-76) 81/62     Weight: 84.2 kg (185 lb 10 oz)  Body mass index is 28.22 kg/m².      Intake/Output Summary (Last 24 hours) at 07/31/17 1542  Last data filed at 07/31/17 1500   Gross per 24 hour   Intake           3770.9 ml   Output             2974 ml   Net            796.9 ml       Hemodynamic Parameters:  PAP: (41-55)/(15-30) 41/17  PAP (Mean):  [26 mmHg-37 mmHg] 26 mmHg  CO:  [4.7 L/min-6.4 L/min] 5.4 L/min  CI:  [2.4 L/min/m2-3.3 L/min/m2] 2.8 L/min/m2      Physical Exam   Constitutional: He appears well-developed and well-nourished. He is sedated.   HENT:   Head: Normocephalic and atraumatic.   Eyes: EOM are normal. Pupils are equal, round, and reactive to light.   Neck: Neck supple. No tracheal deviation present.   Cardiovascular:   VAD hum   Pulmonary/Chest: Effort normal.   VAD hum   Abdominal: Soft. Bowel sounds are decreased.   Neurological:   MARIE intubated and sedated   Skin: Skin is warm and dry.   Midsternum incision open, dressing occlusive and intact    Psychiatric:            Significant Labs:  CBC:    Recent Labs  Lab 07/31/17  0400   WBC 13.90*   RBC 3.44*   HGB 9.8*   HCT 28.0*   *   MCV 81*   MCH 28.5   MCHC 35.0     BNP:    Recent Labs  Lab 07/31/17  0400   BNP 1,389*     CMP:    Recent Labs  Lab 07/31/17  0554   *   CALCIUM 8.4*   ALBUMIN 3.1*   PROT 6.4      K 4.0   CO2 21*      BUN 31*   CREATININE 1.1   ALKPHOS 67   ALT 32   AST 43*   BILITOT 2.2*      Coagulation:     Recent Labs  Lab 07/31/17  0554   INR 1.1   APTT 46.0*     LDH:    Recent Labs  Lab 07/29/17  0400 07/30/17  0400 07/31/17  0400   * 507* 380*     Microbiology:  Microbiology Results (last 7 days)     Procedure Component Value Units Date/Time    Blood culture [[id number]] Collected:  07/24/17 1300    Order Status:  Completed Specimen:  Blood  from Peripheral, Hand, Right Updated:  07/29/17 1812     Blood Culture, Routine No growth after 5 days.    Urine culture [[id number]]  (Susceptibility) Collected:  07/24/17 1139    Order Status:  Completed Specimen:  Urine from Urine, Clean Catch Updated:  07/29/17 1506     Urine Culture, Routine --     ENTEROCOCCUS FAECALIS  >100,000 cfu/ml  No other significant isolate      Blood culture [[id number]] Collected:  07/24/17 1100    Order Status:  Completed Specimen:  Blood from Peripheral, Antecubital, Left Updated:  07/29/17 1412     Blood Culture, Routine No growth after 5 days.          I have reviewed all pertinent labs within the past 24 hours.    Estimated Creatinine Clearance: 68.9 mL/min (based on Cr of 1.1).    Diagnostic Results:  I have reviewed and interpreted all pertinent imaging results/findings within the past 24 hours.    Assessment and Plan:     Atrial fibrillation    - c/w heparin gtt  - c/w amio gtt        LVAD (left ventricular assist device) present    - LVAD HM III  - Speed 5100  - no events  - LDH stable Monitor daily  - on heparin gtt        Urine culture positive    - patient experiencing fevers  - UA - pyuria, bacteruria  - Ucx + enterococci  - IV cefepime        Atrial tachycardia    - [id number] - 3  - c/w amiodarone  - c/w heparin gtt        AICD discharge    - appropriate in the setting of VT aggravated by underlying AT/AFL (earlier during the admission)  - 7/28 - setting of AF undersense - device reprogrammed to VVI 80  - tachy therapy turned off  - on amio gtt  - EP to revise RV lead         Hepatitis B core antibody positive since 2012    - will defer liver biopsy as CTS not requiring it  - ID consult cleared the patient for sx  - case discussed with hepatology, low suspicion for liver involvement        V-tach    - s/p amiodarone reload - now on amio gtt        Hyperlipidemia    - c/w pravastatin        * Acute on chronic combined systolic and diastolic heart failure    - CTS  Primary  - s/p LVAD HM III (7/27/17)  - chest closure (7/28/17)  - extubated (7/29/17)  - on  4, epi 0.05, lasix 15  - off cardene, vasopressin gtt  - NO decreased to 5PPM                MELISSA Long  Heart Transplant  [facility]

## 2017-07-31 NOTE — PROGRESS NOTES
Progress Note  Surgical Intensive Care    Admit Date: 7/18/2017  Post-operative Day: 3 Days Post-Op  Hospital Day: 14    SUBJECTIVE:     Follow-up For:  Procedure(s) (LRB):  CLOSURE-STERNAL WOUND (N/A)  PLACEMENT-NEOPERICARDIUM (N/A)   S/p sternotomy closure 7/18/17    Interval history: Patient had some nausea with episode of nonbilious nonbloody emesis overnight. Moderate sternal pain associated with emesis. Otherwise no acute events. On epi and dobutamine, Nehemias at 5ppm on 3L NC. Currently on heparin, lasix , propofol, and amiodarone gtt. Cardene gtt has been on and off overnight, currently on at 5. Good UOP >100cc/hr.    Continuous Infusions:   sodium chloride 0.9% 10 mL/hr at 07/27/17 1451    amiodarone 0.5 mg/min (07/31/17 0700)    dextrose 5 % and 0.9 % NaCl 10 mL/hr at 07/30/17 0026    DOBUTamine 4 mcg/kg/min (07/31/17 0700)    epinephrine infusion (NON-TITRATING) 0.05 mcg/kg/min (07/31/17 0700)    furosemide (LASIX) 1 mg/mL infusion (non-titrating) 15 mg/hr (07/31/17 0700)    heparin (porcine) in 5 % dex 1,100 Units/hr (07/31/17 0700)    nicardipine 2.5 mg/hr (07/31/17 0700)    nitric oxide gas      propofol Stopped (07/29/17 0800)    vasopressin (PITRESSIN) infusion Stopped (07/28/17 1100)     Scheduled Meds:   aspirin  325 mg Oral Daily    aspirin  325 mg Per NG tube Daily    ceFEPime (MAXIPIME) IVPB  1 g Intravenous Q8H    chlorothiazide (DIURIL) IVPB  500 mg Intravenous Once    docusate sodium  200 mg Oral QHS    ferrous gluconate  324 mg Oral Daily with breakfast    magnesium oxide  400 mg Oral TID    mupirocin   Nasal BID    pantoprazole  40 mg Oral Daily    pantoprazole  40 mg Intravenous Daily    potassium chloride  20 mEq Oral BID    pravastatin  20 mg Oral QHS    sodium chloride 0.9%  3 mL Intravenous Q8H     PRN Meds:sodium chloride, sodium chloride, albumin human 5%, albuterol sulfate, bisacodyl, dextrose 50%, fentaNYL, glucagon (human recombinant), insulin aspart, magnesium  hydroxide 400 mg/5 ml, magnesium sulfate IVPB, ondansetron, oxycodone, oxycodone, potassium chloride, potassium chloride, sodium phosphate IVPB, sodium phosphate IVPB, sodium phosphate IVPB    Review of patient's allergies indicates:  No Known Allergies    OBJECTIVE:     Vital Signs (Most Recent)  Temp: 98.3 °F (36.8 °C) (07/31/17 0300)  Pulse: 98 (07/31/17 0800)  Resp: (!) 25 (07/31/17 0800)  BP: (!) 80/0 (07/31/17 0300)  SpO2: 98 % (07/31/17 0722)    Vital Signs Range (Last 24H):  Temp:  [98.3 °F (36.8 °C)-99 °F (37.2 °C)]   Pulse:  []   Resp:  [22-34]   BP: (80-82)/(0)   SpO2:  [90 %-100 %]   Arterial Line BP: (73-97)/(54-76)     I & O (Last 24H):    Intake/Output Summary (Last 24 hours) at 07/31/17 0842  Last data filed at 07/31/17 0700   Gross per 24 hour   Intake           3770.9 ml   Output             3581 ml   Net            189.9 ml     Ventilator Data (Last 24H):     Oxygen Concentration (%):  [50] 50    Hemodynamic Parameters (Last 24H):  PAP: (33-55)/(9-30) 48/22  PAP (Mean):  [19 mmHg-37 mmHg] 32 mmHg  CO:  [4.7 L/min-6.4 L/min] 6.3 L/min  CI:  [2.4 L/min/m2-3.3 L/min/m2] 3.2 L/min/m2    Physical Exam   Constitutional: He appears well-developed and well-nourished. No distress. Resting comfortably   HENT:   Head: Normocephalic and atraumatic.   Eyes: Right eye exhibits no discharge. Left eye exhibits no discharge. No scleral icterus.   Neck: Normal range of motion. Neck supple.   Cardiovascular: Intact distal pulses.    LVAD hum present.   Pulmonary/Chest: Effort normal. No respiratory distress. NC 3L  R and L meds chest tubes in place- minimal serosanguinous drainage.  Bandage over sternotomy incision   Abdominal: Soft. He exhibits no distension.   Skin: Skin is warm and dry.     Laboratory (Last 24H):  CBC:    Recent Labs  Lab 07/31/17  0400   WBC 13.90*   HGB 9.8*   HCT 28.0*   *     CMP:    Recent Labs  Lab 07/31/17  0554   CALCIUM 8.4*   ALBUMIN 3.1*   PROT 6.4      K 4.0   CO2  21*      BUN 31*   CREATININE 1.1   ALKPHOS 67   ALT 32   AST 43*   BILITOT 2.2*       ASSESSMENT/PLAN:     Neuro:   - AAOx3  -off sedation  -continue current pain mgmt     Pulmonary:   -on 3L NC- sating well  -Nehemias at 5ppm  -extubated yesterday  -Chest tubes - R med and 2 L meds. Minimal output     Cardiac:  -MAP range goal >65  -Dobutamine gtt 4  -Epi 0.05  -currently on cardene drip at 5    Renal:   -Singer in place  -Bun/Cr stable 31/1.1  -UOP >100cc/hr  -lasix gtt 20     Fluids/Electrolytes/Nutrition/GI:   -Nutritional status: NPO  -NGT for meds  -replace lytes PRN     Hematology/Oncology:  -H/H not drawn today  -INR 1.2  -on heparin gtt  -Aspirin 325mg     Infectious Disease:   -Afebrile   -WBC 13.9  -cefepime  -Cultures NGTD    Endocrine:  -endocrine following: plan to start low dose novolog correction  -bg goal 140-180  -off insulin gtt     Dispo:  -Continue care in the ICU setting. Out of bed and in chair today.    Vince Murguia PGY-1  [phone]  07/31/2017

## 2017-07-31 NOTE — PT/OT/SLP PROGRESS
"Speech Language Pathology  Treatment    Suman Hayden   MRN: [id number]   Admitting Diagnosis: Acute on chronic combined systolic and diastolic heart failure    Diet recommendations: Solid Diet Level: Dental Soft  Liquid Diet Level: Thin   Aspiration Precautions: HOB to 90 degrees, Small bites/sips, Alternating bites/sips, 1 bite/sip at a time, Meds whole 1 at a time and Assistance with meals    SLP Treatment Date: 07/31/17  Speech Start Time: 0858     Speech Stop Time: 0915     Speech Total (min): 17 min       TREATMENT BILLABLE MINUTES:  Treatment Swallowing Dysfunction 17    Has the patient been evaluated by SLP for swallowing? : Yes  Keep patient NPO?: No   General Precautions: Standard, fall, LVAD, sternal, aspiration  Current Respiratory Status: nasal cannula       Subjective:  "I need to start wearing them again" (pt stated of dentures)    Pain/Comfort  Pain Rating 1: 0/10 (no pain but nausea reported)  Pain Rating Post-Intervention 1: 0/10    Objective:    Pt seen this am with no family present.  Breakfast tray was at bedside; however, pt reporting nausea and stating he was unable to eat.  Nursing confirmed vomiting last night.  Pt agreed to a few small bites of banana and sips of thin water via cup/straw.  Oral phase was functional for trials attempted.  Initiation of pharyngeal phase was minimally delayed and laryngeal elevation appeared functional.  No overt s/s of aspiration observed.  Will f/u x1-2 more times to ensure diet tolerance as pt willing to accept more bolus trials.     Ongoing education provided regarding continue ST role, swallow precautions and poc.  Results of session and report of nausea reviewed with nursing post session.    Assessment:  Suman Hayden is a 67 y.o. male with a medical diagnosis of Acute on chronic combined systolic and diastolic heart failure and presents with no overt s/s of aspiration with limited trials this date.      Discharge recommendations: Discharge Facility/Level Of " Care Needs: home with home health     Goals:    SLP Goals        Problem: SLP Goal    Goal Priority Disciplines Outcome   SLP Goal     SLP    Description:  Speech Language Pathology Goals  Goals expected to be met by 8/4:  1. Pt will tolerate a dental soft diet and thin liquids without s/s of aspiration.                          Plan:   Patient to be seen Therapy Frequency: 5 x/week   Plan of Care expires: 08/27/17  Plan of Care reviewed with: patient  SLP Follow-up?: Yes               SHERRI Diane, CCC-SLP  Speech Language Pathologist  [phone]  7/31/2017

## 2017-07-31 NOTE — ANESTHESIA POSTPROCEDURE EVALUATION
"Anesthesia Post Evaluation    Patient: Suman Hayden    Procedure(s) Performed: Procedure(s) (LRB):  INSERTION-LEFT VENTRICULAR ASSIST DEVICE (N/A)    Final Anesthesia Type: general  Patient location during evaluation: ICU  Patient participation: No - Unable to Participate, Intubation  Level of consciousness: sedated  Post-procedure vital signs: reviewed and stable  Pain management: adequate  Airway patency: patent  PONV status at discharge: No PONV  Anesthetic complications: no      Cardiovascular status: blood pressure returned to baseline  Respiratory status: ETT  Hydration status: euvolemic  Follow-up not needed.        Visit Vitals  BP (!) 82/0 (BP Location: Right arm, Patient Position: Lying, BP Method: Doppler)   Pulse 96   Temp 37.1 °C (98.8 °F) (Core Bladder)   Resp (!) 26   Ht 5' 8" (1.727 m)   Wt 84.2 kg (185 lb 10 oz)   SpO2 98%   BMI 28.22 kg/m²       Pain/Gurpreet Score: Pain Assessment Performed: Yes (7/30/2017  3:00 PM)  Presence of Pain: denies (7/30/2017  3:00 PM)  Pain Rating Prior to Med Admin: 5 (7/30/2017  8:17 AM)  Pain Rating Post Med Admin: 0 (7/29/2017  3:38 AM)      "

## 2017-07-31 NOTE — PT/OT/SLP PROGRESS
Physical Therapy  Co-Treatment with OT    Suman Hayden   MRN: [id number]   Admitting Diagnosis: Acute on chronic combined systolic and diastolic heart failure    PT Received On: 07/31/17  PT Start Time: 0818     PT Stop Time: 0833    PT Total Time (min): 15 min       Billable Minutes:  Therapeutic Activity 15 min    Treatment Type: Other (see comments) (co-treatment with OT)  PT/PTA: PT     PTA Visit Number: 0       General Precautions: Standard, fall, LVAD, sternal  Orthopedic Precautions: N/A   Braces:      Do you have any cultural, spiritual, Protestant conflicts, given your current situation?: none noted     Subjective:  Communicated with nurse prior to session.      Pain/Comfort  Pain Rating 1: 0/10  Pain Rating Post-Intervention 1: 0/10    Objective:   Patient found with: telemetry, arterial line, oxygen, chest tube, delgado catheter, central line (LVAD, nitric oxide, PA)    Functional Mobility:  Bed Mobility:   Rolling/Turning Right: Moderate assistance (pt needed verbal cues for functional mobility with sternal precautions.)  Supine to Sit: Moderate Assistance    Transfers:  Sit <> Stand Assistance: Moderate Assistance  Sit <> Stand Assistive Device: No Assistive Device  Bed <> Chair Technique: Stand Pivot  Bed <> Chair Assistance: Moderate Assistance (pt needed verbal cues to weight shift forward with coming to stand. )    Gait:   Gait Distance: not tested. pt had swan-gerda catheter in place.      AM-PAC 6 CLICK MOBILITY  How much help from another person does this patient currently need?   1 = Unable, Total/Dependent Assistance  2 = A lot, Maximum/Moderate Assistance  3 = A little, Minimum/Contact Guard/Supervision  4 = None, Modified [place]/Independent    Turning over in bed (including adjusting bedclothes, sheets and blankets)?: 2  Sitting down on and standing up from a chair with arms (e.g., wheelchair, bedside commode, etc.): 2  Moving from lying on back to sitting on the side of the bed?: 2  Moving to  and from a bed to a chair (including a wheelchair)?: 2  Need to walk in hospital room?: 1  Climbing 3-5 steps with a railing?: 1  Total Score: 10    AM-PAC Raw Score CMS G-Code Modifier Level of Impairment Assistance   6 % Total / Unable   7 - 9 CM 80 - 100% Maximal Assist   10 - 14 CL 60 - 80% Moderate Assist   15 - 19 CK 40 - 60% Moderate Assist   20 - 22 CJ 20 - 40% Minimal Assist   23 CI 1-20% SBA / CGA   24 CH 0% Independent/ Mod I     Patient left up in chair with all lines intact and call button in reach.    Assessment:  Suman Hayden is a 67 y.o. male with a medical diagnosis of Acute on chronic combined systolic and diastolic heart failure and presents with decreased mobility, transfers, balance and decreased distance ambulated. Pt would benefit from cont PT to address deficits and should be able to discharge home when medically stable. Pt may need HHPT. Pt will benefit from skilled PT 6x/wk to progress with mobility and return pt to Independent status. Pt is s/p LVAD placement  and chest closure 17..    Rehab identified problem list/impairments: Rehab identified problem list/impairments: impaired endurance, impaired functional mobilty, gait instability, impaired balance, decreased lower extremity function    Rehab potential is good.    Activity tolerance: Good    Discharge recommendations: Discharge Facility/Level Of Care Needs: home health PT     Barriers to discharge: Barriers to Discharge: None    Equipment recommendations: Equipment Needed After Discharge:  (will determine DME needs closer to discharge.)     GOALS:    Physical Therapy Goals        Problem: Physical Therapy Goal    Goal Priority Disciplines Outcome Goal Variances Interventions   Physical Therapy Goal     PT/OT, PT Ongoing (interventions implemented as appropriate)     Description:  Goals to be met by: 17     Patient will increase functional independence with mobility by performin. Supine to sit with MInimal  Assistance  2. Sit to supine with MInimal Assistance  3. Sit to stand transfer with Minimal Assistance  4. Bed to chair transfer with Minimal Assistance  5. Gait  x 50 feet with Minimal Assistance.   6. Lower extremity exercise program x15 reps, with supervision, in order to increase LE strength and (I) with functional mobility.                       PLAN:    Patient to be seen 6 x/week  to address the above listed problems via gait training, therapeutic activities, therapeutic exercises  Plan of Care expires: 08/27/17  Plan of Care reviewed with: patient         Astrid DUSTY Whaley, PT  07/31/2017

## 2017-07-31 NOTE — PROGRESS NOTES
Daily E and M and VAD Interrogation Note    Reason for Visit:  Patient is seen in follow up for management of:  [] HeartMate II  [] Heartware [] Total artificial heart       [] ECMO           [x] Other - HM III     Interval History:  [] Interval history unobtainable due to intubation.  The [x] implant/[] explant date was 7/27/17    Events overnight - Patient stable o/n. Epi at 0.05 and dobutamine at 4 over night. Nehemias kept at 5 ppm in 3LNC. Cardene currently at 5. LVAD flow decreased briefly yesterday PM, but returned to baseline after decreasing lasix gtt from 20 to 15 and giving 1 u PRBC over 4 hours.  from 507. CTs with appropriate serosanguineous output.      Review of Systems:   Negative except as above.      Medications:  Current Facility-Administered Medications   Medication Dose Route Frequency Provider Last Rate Last Dose    0.9%  NaCl infusion   Intravenous Continuous Shayne Espinoza MD 10 mL/hr at 07/27/17 1451      albumin human 5% bottle 500 mL  500 mL Intravenous PRN Shayne Espinoza MD   500 mL at 07/28/17 1755    albuterol nebulizer solution 2.5 mg  2.5 mg Nebulization Q4H PRN Shayne Espinoza MD        amiodarone 360 mg/200 mL (1.8 mg/mL) infusion  0.5 mg/min Intravenous Continuous Shayne Espinoza MD 16.7 mL/hr at 07/31/17 1000 0.5 mg/min at 07/31/17 1000    amiodarone tablet 400 mg  400 mg Oral BID Shayne Espinoza MD   400 mg at 07/31/17 1000    aspirin EC tablet 325 mg  325 mg Oral Daily Shayne Espinoza MD   325 mg at 07/31/17 0843    bisacodyl suppository 10 mg  10 mg Rectal Daily PRN Shayne Espinoza MD        dextrose 5 % and 0.9 % NaCl infusion   Intravenous Continuous Shayne Espinoza MD 10 mL/hr at 07/30/17 0026      DOBUtamine 1000 mg in D5W 250 mL infusion (premix non-titrating)  4 mcg/kg/min (Dosing Weight) Intravenous Continuous Sunny Downing MD 4.8 mL/hr at 07/31/17 1000 4 mcg/kg/min at 07/31/17 1000    docusate sodium capsule 200 mg  200 mg  Oral QHS Shayne Espinoza MD   200 mg at 07/30/17 2128    EPINEPHrine (ADRENALIN) 4 mg in sodium chloride 0.9% 250 mL infusion  0.1 mcg/kg/min (Dosing Weight) Intravenous Continuous Shayne Espinoza MD 9 mL/hr at 07/31/17 1000 0.03 mcg/kg/min at 07/31/17 1000    fentaNYL injection 50 mcg  50 mcg Intravenous Q4H PRN Shayne Espinoza MD   50 mcg at 07/29/17 0827    ferrous gluconate tablet 324 mg  324 mg Oral Daily with breakfast Shayne Espinoza MD   324 mg at 07/30/17 0731    furosemide (LASIX) 250 mg in sodium chloride 0.9 % 250 mL infusion (non-titrating)  15 mg/hr Intravenous Continuous Shayne Espinoza MD 15 mL/hr at 07/31/17 1000 15 mg/hr at 07/31/17 1000    heparin 25,000 units in dextrose 5% 250 mL (100 units/mL) infusion (heparin infusion)  1,100 Units/hr Intravenous Continuous Shayne Espinoza MD   Stopped at 07/31/17 0900    magnesium hydroxide 400 mg/5 ml suspension 2,400 mg  30 mL Oral Daily PRN Shayne Espinoza MD        magnesium oxide tablet 400 mg  400 mg Oral TID Shayne Espinoza MD   400 mg at 07/30/17 2129    magnesium sulfate 2g in water 50mL IVPB (premix)  2 g Intravenous PRN Shayne Espinoza MD   2 g at 07/30/17 0731    mupirocin 2 % ointment   Nasal BID Shayne Espinoza MD        niCARdipine 40 mg/200 mL infusion  1 mg/hr Intravenous Continuous Shayne Espinoza MD 25 mL/hr at 07/31/17 0900 5 mg/hr at 07/31/17 0900    nitric oxide gas Gas 10 ppm  10 ppm Inhalation Continuous Sunny Downing MD   10 ppm at 07/29/17 1246    ondansetron injection 4 mg  4 mg Intravenous Q8H PRN Shayne Espinoza MD        oxycodone immediate release tablet 10 mg  10 mg Oral Q4H PRN Shayne Espinoza MD        oxycodone immediate release tablet 5 mg  5 mg Oral Q4H PRN Shayne Espinoza MD   5 mg at 07/30/17 0817    pantoprazole EC tablet 40 mg  40 mg Oral Daily Shayne Espinoza MD   40 mg at 07/31/17 0985    polyethylene glycol packet 17 g  17 g Oral BID  Shayne Espinoza MD   17 g at 07/31/17 1000    potassium chloride 20 mEq in 100 mL IVPB (FOR CENTRAL LINE ADMINISTRATION ONLY)  40 mEq Intravenous PRN Shayne Espinoza MD 50 mL/hr at 07/30/17 1336 40 mEq at 07/30/17 1336    potassium chloride 20 mEq in 100 mL IVPB (FOR CENTRAL LINE ADMINISTRATION ONLY)  40 mEq Intravenous PRN Shayne Espinoza MD        potassium chloride CR capsule 20 mEq  20 mEq Oral BID Shayne Espinoza MD   20 mEq at 07/31/17 0938    pravastatin tablet 20 mg  20 mg Oral QHS Lorean Payton MD   20 mg at 07/30/17 2129    sodium chloride 0.9% flush 3 mL  3 mL Intravenous Q8H Shayne Espinoza MD   3 mL at 07/30/17 2300    sodium phosphate 15 mmol in dextrose 5 % 250 mL IVPB  15 mmol Intravenous PRN Edwige Clay MD 83.3 mL/hr at 07/29/17 2240 15 mmol at 07/29/17 2240    sodium phosphate 20.01 mmol in dextrose 5 % 250 mL IVPB  20.01 mmol Intravenous PRN Edwige Clay MD        sodium phosphate 30 mmol in dextrose 5 % 250 mL IVPB  30 mmol Intravenous PRN Edwige Clay MD        warfarin (COUMADIN) tablet 4 mg  4 mg Oral Once Shayne Espinoza MD           Physical Examination:  Vital Signs:   Vitals:    07/31/17 1045   BP:    Pulse: 96   Resp: (!) 29   Temp:      Cardiovascular:  [x] Regular rate and rhythm [] Irregular []  None (MATT) []  Other  []  No edema [x]  Edema present  [x]  Clear to auscultation  []  Rales to []  Coarse  []  No rales but   [] Pedal Pulses absent  [x]  Pulses  + throughout  Skin:  Incision is [x]  Clean, dry and intact.  []  Other   Sternum:  []  Stable [x]  Unstable  Driveline(s):   [x]  Clean, dry and intact. []  Other       Labs:  [facility]  Lab Results   Component Value Date     07/31/2017    K 4.0 07/31/2017     07/31/2017    CO2 21 (L) 07/31/2017    BUN 31 (H) 07/31/2017    CREATININE 1.1 07/31/2017    CALCIUM 8.4 (L) 07/31/2017    ANIONGAP 12 07/31/2017    ESTGFRAFRICA >60.0 07/31/2017    EGFRNONAA >60.0 07/31/2017        Magnesium   Date Value Ref Range Status   07/31/2017 2.3 1.6 - 2.6 mg/dL Final       Lab Results   Component Value Date    WBC 13.90 (H) 07/31/2017    HGB 9.8 (L) 07/31/2017    HCT 28.0 (L) 07/31/2017    MCV 81 (L) 07/31/2017     (L) 07/31/2017       Lab Results   Component Value Date    INR 1.1 07/31/2017    INR 1.2 07/30/2017    INR 1.4 (H) 07/29/2017       BNP   Date Value Ref Range Status   07/31/2017 1,389 (H) 0 - 99 pg/mL Final     Comment:     Values of less than 100 pg/ml are consistent with non-CHF populations.   07/28/2017 1,184 (H) 0 - 99 pg/mL Final     Comment:     Values of less than 100 pg/ml are consistent with non-CHF populations.   07/24/2017 809 (H) 0 - 99 pg/mL Final     Comment:     Values of less than 100 pg/ml are consistent with non-CHF populations.       LD   Date Value Ref Range Status   07/31/2017 380 (H) 110 - 260 U/L Final     Comment:     Results are increased in hemolyzed samples.   07/30/2017 507 (H) 110 - 260 U/L Final     Comment:     Results are increased in hemolyzed samples.   07/29/2017 429 (H) 110 - 260 U/L Final     Comment:     Results are increased in hemolyzed samples.       X-Rays:  [x]  I reviewed today's Chest x-ray    Procedure:  Device Interrogation including analysis of device parameters.  Current Settings   [x]  Ventricular Assist Device  []  Total Artificial Heart interrogated  Review of device function is [x]  Stable []  Other   TXP LVAD INTERROGATIONS 7/31/2017 7/31/2017 7/31/2017 7/31/2017 7/31/2017 7/31/2017 7/31/2017   Type HeartMate3 HeartMate3 HeartMate3 HeartMate3 HeartMate3 HeartMate3 HeartMate3   Flow 4.2 4.1 4.1 4 4.0 3.9 3.9   Speed 5100 5100 5100 5100 5100 5100 5100   PI 3.3 3.3 3.4 3.5 3.7 4.0 4.3   Power ([place]) 3.4 3.4 3.5 3.4 3.5 3.4 3.4   LSL - - - - - - -   Pulsatility - - - - - - -       Assessment:  [x]  Primary Cardiomyopathy []  Congestive Heart Failure   []  Atrial Fibrillation []  Ventricular Tachycardia   []  Aftercare cardiac  device []  Long term (current) use of anticoagulants   []  Ventilator-associated pneumonia []  Pneumonia viral, unspecified   []  Pneumonia, bacterial, unspecified []  Pneumonia, organism unspecified   []  Hemorrhage of GI tract, unspecified    []  Nosebleed []  Driveline infection   []  Infection VAD device []  New onset of    []        Plan:  [x]  Interval history obtained from ICU attending team member during rounding today  []  VAD/MATT teaching performed with patient  []  Mobilization / Physical Therapy ongoing  []  Anticoagulation []  Ongoing []  Held  []  Studies ordered  [x]   To OR today for closure.       Total time spent was 30 minutes.  Of which more than 50 percent of the care dominated counseling and coordinating care with different team members. The VAD was interrogated and all parameters were WNL and no significant findings were found in the history. All these findings are documented in the note above.    Neuro:  - Continue current pain control regimen    Resp:  - Intubated   Oxygen Concentration (%):  [50] 50  - Minimize supplemental O2 requirements  - Will remove right chest tube - hold heparin one hour before, restart one hour after.    CV:  - HDS; LVAD numbers stable o/n  -  from 507  - BNP 1389 from 1184  - Amio - transition to PO - 400 mg this AM, then stop drip 6 hours after and continue PO only at 400 mg BID  - Wean epi 0.01 q 12 hours as tolerated.   - Epi kept at 0.05 overnight, Dobutamine kept at 4   -     Heme:  - Hgb/Hct stable  - Continue to trend  - Coumadin tonight - 4 mg  - Heparin gtt - hold for one hour prior to chest tube removal, then restart one hour after.     ID:  - D/c cefepime  - Tmax 99  - WBC 13.9  - continue to monitor       Renal:  - Singer in place  - Strict I/Os - Net -3L over past 24h  - Lasix gtt at 15 mg/hr    FEN/GI:  - Dental soft diet  - Miralax daily.   - Replace lytes PRN    Endo:  - Endocrine following, appreciate assistance  - Accuchecks  -  Insulin gtt    Dispo:  - Continue ICU care      Shayne Espinoza MD  General Surgery PGY-2  Pager: [phone]    Cardiac Surgery Attending E and M (VAD) Note along with VAD Interrogation    I have seen and examined the patient and agree with the findings above    I also reviewed the patients clinical course and:  [x]  Hemodynamic & Respiratory parameters  [x]  Laboratory Data  [x]  Radiological studies     Increase epinephrine as RV still dysfunctional.    VAD Interrogated [x]      VAD Function is normal. Changes made []  none [x]      Interrogation of Ventricular assist device was performed with physician analysis of device parameters and review of device function. I have personally reviewed the interrogation findings and agree with findings as stated.

## 2017-07-31 NOTE — SUBJECTIVE & OBJECTIVE
"Interval HPI:   Overnight events: BG at goal with no insulin needs.   Eatin% on dental soft diet   Nausea: No  Hypoglycemia and intervention: No  Fever: No  TPN and/or TF: No  BP (!) 80/0 (BP Location: Right arm, Patient Position: Lying, BP Method: Doppler)   Pulse 97   Temp 98 °F (36.7 °C) (Core ([place]))   Resp (!) 32   Ht 5' 8" (1.727 m)   Wt 84.2 kg (185 lb 10 oz)   SpO2 98%   BMI 28.22 kg/m²     Labs Reviewed and Include      Recent Labs  Lab 17  0554   *   CALCIUM 8.4*   ALBUMIN 3.1*   PROT 6.4      K 4.0   CO2 21*      BUN 31*   CREATININE 1.1   ALKPHOS 67   ALT 32   AST 43*   BILITOT 2.2*     Lab Results   Component Value Date    WBC 13.90 (H) 2017    HGB 9.8 (L) 2017    HCT 28.0 (L) 2017    MCV 81 (L) 2017     (L) 2017     No results for input(s): TSH, FREET4 in the last 168 hours.  Lab Results   Component Value Date    HGBA1C 5.4 2017       Nutritional status:   Body mass index is 28.22 kg/m².  Lab Results   Component Value Date    ALBUMIN 3.1 (L) 2017    ALBUMIN 4.1 2017    ALBUMIN 3.0 (L) 2017    ALBUMIN 3.0 (L) 2017     Lab Results   Component Value Date    PREALBUMIN 13 (L) 2017    PREALBUMIN 18 (L) 2017       Estimated Creatinine Clearance: 68.9 mL/min (based on Cr of 1.1).    Accu-Checks  Recent Labs      17   0003  17   0303  17   0401  17   0612  17   0754  17   1204  17   0014  17   0417  17   0839   POCTGLUCOSE  134*  130*  161*  139*  134*  134*  122*  153*  134*  132*       Current Medications and/or Treatments Impacting Glycemic Control  Immunotherapy:  Immunosuppressants     None        Steroids:   Hormones     None        Pressors:    Autonomic Drugs     Start     Stop Route Frequency Ordered    17 0297  EPINEPHrine (ADRENALIN) 4 mg in sodium chloride 0.9% 250 mL infusion      -- IV Continuous " 07/27/17 7864        Hyperglycemia/Diabetes Medications: Antihyperglycemics     None

## 2017-07-31 NOTE — PROCEDURES
TXP LVAD INTERROGATIONS 7/31/2017 7/31/2017 7/31/2017 7/31/2017 7/31/2017 7/31/2017 7/31/2017   Type HeartMate3 HeartMate3 HeartMate3 HeartMate3 HeartMate3 HeartMate3 HeartMate3   Flow 4.1 4 4 4.1 4.1 4.2 4.1   Speed 5100 5100 5100 5100 5100 5100 5100   PI 3.1 3.3 3.3 3.2 3.3 3.3 3.3   Power ([place]) 3.5 3.6 3.5 3.5 3.4 3.4 3.4   LSL - - - - - - -   Pulsatility - - - - - - -     Pt AAAO, no family at bedside.   VAD sounds HM3  Pulsatile  HCT 28  No alarms noted in history

## 2017-07-31 NOTE — PROGRESS NOTES
Update Note:    SW to pt's room for update. MD at bedside, no family present. SW to return as appropriate. SW providing ongoing psychosocial counseling and support, education, assistance, resources, and discharge planning as indicated. SW continuing to follow and remains available.

## 2017-07-31 NOTE — PROGRESS NOTES
Daily E and M and VAD Interrogation Note    Reason for Visit:  Patient is seen in follow up for management of:  [] HeartMate II  [] Heartware [] Total artificial heart       [] ECMO           [x] Other - HM III     Interval History:  [x] Interval history unobtainable due to intubation.  The [x] implant/[] explant date was 7/27/17    Events overnight - Patient stable o/n. Closed yesterday. Ventilator and pressor requirements minimal; Cr stable.      Review of Systems:   Unable to obtain     Medications:  Current Facility-Administered Medications   Medication Dose Route Frequency Provider Last Rate Last Dose    0.9%  NaCl infusion (for blood administration)   Intravenous Q24H PRN Edwige Clay MD        0.9%  NaCl infusion (for blood administration)   Intravenous Q24H PRN Sunny Downing MD        0.9%  NaCl infusion   Intravenous Continuous Shayne Espinoza MD 10 mL/hr at 07/27/17 1451      albumin human 5% bottle 500 mL  500 mL Intravenous PRN Shayne Espinoza MD   500 mL at 07/28/17 1755    albuterol nebulizer solution 2.5 mg  2.5 mg Nebulization Q4H PRN Shayne Espinoza MD        amiodarone 360 mg/200 mL (1.8 mg/mL) infusion  0.5 mg/min Intravenous Continuous Shayne Espinoza MD 16.7 mL/hr at 07/30/17 1900 0.5 mg/min at 07/30/17 1900    aspirin EC tablet 325 mg  325 mg Oral Daily Shayne Espinoza MD   325 mg at 07/30/17 0814    aspirin tablet 325 mg  325 mg Per NG tube Daily Shayne Espinoza MD   325 mg at 07/29/17 0827    bisacodyl suppository 10 mg  10 mg Rectal Daily PRN Shayne Espinoza MD        cefepime in dextrose 5 % 1 gram/50 mL IVPB 1 g  1 g Intravenous Q8H Shayne Espinoza  mL/hr at 07/30/17 1722 1 g at 07/30/17 1722    chlorothiazide (DIURIL) 500 mg in dextrose 5 % 50 mL IVPB  500 mg Intravenous Once Shayne Espinoza MD        dextrose 5 % and 0.9 % NaCl infusion   Intravenous Continuous Shayne Espinoza MD 10 mL/hr at 07/30/17 0026      dextrose  50% injection 12.5 g  12.5 g Intravenous PRN Angela Valdez MD        DOBUtamine 1000 mg in D5W 250 mL infusion (premix non-titrating)  4 mcg/kg/min (Dosing Weight) Intravenous Continuous Sunny Downing MD 4.8 mL/hr at 07/30/17 1900 4 mcg/kg/min at 07/30/17 1900    docusate sodium capsule 200 mg  200 mg Oral QHS Shayne Espinoza MD   200 mg at 07/30/17 2128    EPINEPHrine (ADRENALIN) 4 mg in sodium chloride 0.9% 250 mL infusion  0.1 mcg/kg/min (Dosing Weight) Intravenous Continuous Shayne Espinoza MD 15 mL/hr at 07/30/17 1900 0.05 mcg/kg/min at 07/30/17 1900    fentaNYL injection 50 mcg  50 mcg Intravenous Q4H PRN Shayne Espinoza MD   50 mcg at 07/29/17 0827    ferrous gluconate tablet 324 mg  324 mg Oral Daily with breakfast Shayne Espinoza MD   324 mg at 07/30/17 0731    furosemide (LASIX) 250 mg in sodium chloride 0.9 % 250 mL infusion (non-titrating)  15 mg/hr Intravenous Continuous Shayne Espinoza MD 15 mL/hr at 07/30/17 1900 15 mg/hr at 07/30/17 1900    glucagon (human recombinant) injection 1 mg  1 mg Intramuscular PRN Angela Valdez MD        heparin 25,000 units in dextrose 5% 250 mL (100 units/mL) infusion (heparin infusion)  1,100 Units/hr Intravenous Continuous Shayne Espinoza MD 11 mL/hr at 07/30/17 2000 1,100 Units/hr at 07/30/17 2000    insulin aspart pen 0-5 Units  0-5 Units Subcutaneous Q4H PRN Angela Valdez MD        magnesium hydroxide 400 mg/5 ml suspension 2,400 mg  30 mL Oral Daily PRN Shayne Espinoza MD        magnesium oxide tablet 400 mg  400 mg Oral TID Shayne Espinoza MD   400 mg at 07/30/17 2129    magnesium sulfate 2g in water 50mL IVPB (premix)  2 g Intravenous PRN Shayne Espinoza MD   2 g at 07/30/17 0731    mupirocin 2 % ointment   Nasal BID Shayne Epsinoza MD        niCARdipine 40 mg/200 mL infusion  1 mg/hr Intravenous Continuous Shayne Espinoza MD 25 mL/hr at 07/30/17 2000 5 mg/hr at 07/30/17 2000    nitric oxide gas Gas 10  ppm  10 ppm Inhalation Continuous Sunny Downing MD   10 ppm at 07/29/17 1246    ondansetron injection 4 mg  4 mg Intravenous Q8H PRN Shayne Espinoza MD        oxycodone immediate release tablet 10 mg  10 mg Oral Q4H PRN Shayne Espinoza MD        oxycodone immediate release tablet 5 mg  5 mg Oral Q4H PRN Shayne Espinoza MD   5 mg at 07/30/17 0817    pantoprazole EC tablet 40 mg  40 mg Oral Daily Shayne Espinoza MD   40 mg at 07/30/17 0814    pantoprazole injection 40 mg  40 mg Intravenous Daily Shayne Espinoza MD   40 mg at 07/29/17 0904    potassium chloride 20 mEq in 100 mL IVPB (FOR CENTRAL LINE ADMINISTRATION ONLY)  40 mEq Intravenous PRN Shayne Espinoza MD 50 mL/hr at 07/30/17 1336 40 mEq at 07/30/17 1336    potassium chloride 20 mEq in 100 mL IVPB (FOR CENTRAL LINE ADMINISTRATION ONLY)  40 mEq Intravenous PRN Shayne Espinoza MD        potassium chloride CR capsule 20 mEq  20 mEq Oral BID Shayne Espinoza MD   20 mEq at 07/30/17 2128    pravastatin tablet 20 mg  20 mg Oral QHS Azfar Niurka Payton MD   20 mg at 07/30/17 2129    propofol (DIPRIVAN) 10 mg/mL infusion  5 mcg/kg/min (Dosing Weight) Intravenous Continuous Shayne Espinoza MD   Stopped at 07/29/17 0800    sodium chloride 0.9% flush 3 mL  3 mL Intravenous Q8H Shayne Espinoza MD   3 mL at 07/29/17 2211    sodium phosphate 15 mmol in dextrose 5 % 250 mL IVPB  15 mmol Intravenous PRN Edwige Clay MD 83.3 mL/hr at 07/29/17 2240 15 mmol at 07/29/17 2240    sodium phosphate 20.01 mmol in dextrose 5 % 250 mL IVPB  20.01 mmol Intravenous PRN Edwige Clay MD        sodium phosphate 30 mmol in dextrose 5 % 250 mL IVPB  30 mmol Intravenous PRN Edwige Clay MD        vasopressin (PITRESSIN) 100 Units in dextrose 5 % 100 mL infusion  0.02 Units/min Intravenous Continuous Sunny Downing MD   Stopped at 07/28/17 1100       Physical Examination:  Vital Signs:   Vitals:    07/30/17 2200   BP:     Pulse: 97   Resp: (!) 28   Temp:      Cardiovascular:  [x] Regular rate and rhythm [] Irregular []  None (MATT) []  Other  []  No edema [x]  Edema present  [x]  Clear to auscultation  []  Rales to []  Coarse  []  No rales but   [] Pedal Pulses absent  [x]  Pulses  + throughout  Skin:  Incision is [x]  Clean, dry and intact.  []  Other   Sternum:  []  Stable [x]  Unstable  Driveline(s):   [x]  Clean, dry and intact. []  Other       Labs:  BMP  Lab Results   Component Value Date     (L) 07/30/2017    K 4.4 07/30/2017     07/30/2017    CO2 22 (L) 07/30/2017    BUN 30 (H) 07/30/2017    CREATININE 1.1 07/30/2017    CALCIUM 8.6 (L) 07/30/2017    ANIONGAP 9 07/30/2017    ESTGFRAFRICA >60.0 07/30/2017    EGFRNONAA >60.0 07/30/2017       Magnesium   Date Value Ref Range Status   07/30/2017 2.3 1.6 - 2.6 mg/dL Final       Lab Results   Component Value Date    WBC 15.28 (H) 07/30/2017    HGB 8.7 (L) 07/30/2017    HCT 25.3 (L) 07/30/2017    MCV 83 07/30/2017    PLT 99 (L) 07/30/2017       Lab Results   Component Value Date    INR 1.2 07/30/2017    INR 1.4 (H) 07/29/2017    INR 1.4 (H) 07/29/2017       BNP   Date Value Ref Range Status   07/28/2017 1,184 (H) 0 - 99 pg/mL Final     Comment:     Values of less than 100 pg/ml are consistent with non-CHF populations.   07/24/2017 809 (H) 0 - 99 pg/mL Final     Comment:     Values of less than 100 pg/ml are consistent with non-CHF populations.   07/21/2017 912 (H) 0 - 99 pg/mL Final     Comment:     Values of less than 100 pg/ml are consistent with non-CHF populations.       LD   Date Value Ref Range Status   07/30/2017 507 (H) 110 - 260 U/L Final     Comment:     Results are increased in hemolyzed samples.   07/29/2017 429 (H) 110 - 260 U/L Final     Comment:     Results are increased in hemolyzed samples.   07/28/2017 388 (H) 110 - 260 U/L Final     Comment:     Results are increased in hemolyzed samples.       X-Rays:  [x]  I reviewed today's Chest x-ray    Procedure:   Device Interrogation including analysis of device parameters.  Current Settings   [x]  Ventricular Assist Device  []  Total Artificial Heart interrogated  Review of device function is [x]  Stable []  Other   TXP LVAD INTERROGATIONS 7/30/2017 7/30/2017 7/30/2017 7/30/2017 7/30/2017 7/30/2017 7/30/2017   Type HeartMate3 HeartMate3 HeartMate3 HeartMate3 HeartMate3 HeartMate3 HeartMate3   Flow 4.0 4.0 3.8 3.8 3.7 3.7 3.4   Speed 5100 5100 5100 5100 5100 5100 5100   PI 3.8 3.9 4.5 4.8 5.2 4.9 6.2   Power ([place]) 3.4 3.4 3.5 3.5 3.5 3.4 3.1   LSL - - - - - - -   Pulsatility - - - - - - -       Assessment:  [x]  Primary Cardiomyopathy []  Congestive Heart Failure   []  Atrial Fibrillation []  Ventricular Tachycardia   []  Aftercare cardiac device []  Long term (current) use of anticoagulants   []  Ventilator-associated pneumonia []  Pneumonia viral, unspecified   []  Pneumonia, bacterial, unspecified []  Pneumonia, organism unspecified   []  Hemorrhage of GI tract, unspecified    []  Nosebleed []  Driveline infection   []  Infection VAD device []  New onset of    []        Plan:  [x]  Interval history obtained from ICU attending team member during rounding today  []  VAD/MATT teaching performed with patient  []  Mobilization / Physical Therapy ongoing  []  Anticoagulation []  Ongoing []  Held  []  Studies ordered  [x]   To OR today for closure.       Total time spent was 30 minutes.  Of which more than 50 percent of the care dominated counseling and coordinating care with different team members. The VAD was interrogated and all parameters were WNL and no significant findings were found in the history. All these findings are documented in the note above.    Neuro:  - Sedated, intubated.   - Wean sedation   - Continue current pain control regimen    Resp:  - Intubated  Oxygen Concentration (%):  [50] 50  - Minimize supplemental O2 requirements  - Chest tubes to remain in place to wall suction    CV:  - HDS; LVAD numbers  stable  - Wean pressors as tolerated  - Epi kept at 0.08 overnight, Dobutamine kept at 4   -     Heme:  - Hgb/Hct stable  - Continue to trend  - Coumadin tonight  - Heparin gtt    ID:  - Cefepime postop  - D/C vanc, fluconazole.       Renal:  - Singer in place  - Strict I/Os    FEN/GI:  - ADAT to cardiac diet (low sodium, 1500 mL restriction)   - Replace lytes PRN    Endo:  - Endocrine following, appreciate assistance  - Accuchecks  - Insulin gtt    Dispo:  - Continue ICU care        Shayne Espinoza MD  General Surgery PGY-2  Pager: [phone]

## 2017-07-31 NOTE — NURSING
Opthalmology service presents to bedside to examine pt and dilates bilateral pupils for examination.

## 2017-07-31 NOTE — PROGRESS NOTES
[facility]  Cardiac Electrophysiology  Progress Note    Admission Date: 7/18/2017  Code Status: Prior   Attending Physician: Sunny Downing MD   Expected Discharge Date: 8/15/2017  Principal Problem:Acute on chronic combined systolic and diastolic heart failure    Subjective:     Interval History: device interrogated this AM at bedside showing worsening function of RV lead.    Review of Systems   Constitution: Negative.   HENT: Negative.    Eyes: Negative.    Cardiovascular: Negative.    Respiratory: Negative.    Endocrine: Negative.    Skin: Negative.    Musculoskeletal: Negative.    Gastrointestinal: Negative.    Genitourinary: Negative.    Neurological: Negative.      Objective:     Vital Signs (Most Recent):  Temp: 98 °F (36.7 °C) (07/31/17 1100)  Pulse: 97 (07/31/17 1519)  Resp: (!) 32 (07/31/17 1519)  BP: (!) 80/0 (07/31/17 0300)  SpO2: 98 % (07/31/17 0722) Vital Signs (24h Range):  Temp:  [98 °F (36.7 °C)-98.9 °F (37.2 °C)] 98 °F (36.7 °C)  Pulse:  [] 97  Resp:  [22-34] 32  SpO2:  [90 %-100 %] 98 %  BP: (80-82)/(0) 80/0  Arterial Line BP: (75-97)/(58-76) 81/62     Weight: 84.2 kg (185 lb 10 oz)  Body mass index is 28.22 kg/m².     SpO2: 98 %  O2 Device (Oxygen Therapy): nasal cannula w/ humidification    Physical Exam   Constitutional: He is oriented to person, place, and time. He appears well-developed and well-nourished.   HENT:   Head: Normocephalic and atraumatic.   Eyes: Conjunctivae and EOM are normal. Pupils are equal, round, and reactive to light.   Neck: Normal range of motion. Neck supple. No JVD present.   Cardiovascular:   Smooth vad sounds     Pulmonary/Chest: Effort normal and breath sounds normal. No respiratory distress. He has no wheezes. He has no rales. He exhibits no tenderness.   Abdominal: Soft. Bowel sounds are normal. He exhibits no distension. There is no tenderness.   Musculoskeletal: Normal range of motion. He exhibits no edema or tenderness.    Neurological: He is alert and oriented to person, place, and time.   Skin: Skin is warm and dry. No erythema. No pallor.       Significant Labs:   EP:   Recent Labs  Lab 07/29/17  1604  07/30/17  0400 07/30/17  0600  07/30/17  1801 07/31/17  0000 07/31/17  0400 07/31/17  0554   *  < >  --  136  < > 134* 133*  --  136   K 3.9  < >  --  3.8  < > 4.4 4.0  --  4.0     < >  --  103  < > 103 101  --  103   CO2 20*  < >  --  21*  < > 22* 21*  --  21*   *  < >  --  123*  < > 135* 139*  --  129*   BUN 26*  < >  --  28*  < > 30* 30*  --  31*   CREATININE 1.2  < >  --  1.1  < > 1.1 1.1  --  1.1   CALCIUM 8.6*  < >  --  8.6*  < > 8.6* 8.5*  --  8.4*   PROT  --   --   --   --   --   --   --   --  6.4   ALBUMIN  --   --   --   --   --   --   --   --  3.1*   BILITOT  --   --   --   --   --   --   --   --  2.2*   ALKPHOS  --   --   --   --   --   --   --   --  67   AST  --   --   --   --   --   --   --   --  43*   ALT  --   --   --   --   --   --   --   --  32   ANIONGAP 11  < >  --  12  < > 9 11  --  12   ESTGFRAFRICA >60.0  < >  --  >60.0  < > >60.0 >60.0  --  >60.0   EGFRNONAA >60.0  < >  --  >60.0  < > >60.0 >60.0  --  >60.0   WBC 16.18*  --  15.28*  --   --   --   --  13.90*  --    HGB 9.1*  --  8.7*  --   --   --   --  9.8*  --    HCT 26.4*  --  25.3*  --   --   --   --  28.0*  --    PLT 98*  --  99*  --   --   --   --  138*  --    INR 1.4*  --   --  1.2  --   --   --   --  1.1   < > = values in this interval not displayed., CMP:   Recent Labs  Lab 07/30/17  1801 07/31/17  0000 07/31/17  0554   * 133* 136   K 4.4 4.0 4.0    101 103   CO2 22* 21* 21*   * 139* 129*   BUN 30* 30* 31*   CREATININE 1.1 1.1 1.1   CALCIUM 8.6* 8.5* 8.4*   PROT  --   --  6.4   ALBUMIN  --   --  3.1*   BILITOT  --   --  2.2*   ALKPHOS  --   --  67   AST  --   --  43*   ALT  --   --  32   ANIONGAP 9 11 12   ESTGFRAFRICA >60.0 >60.0 >60.0   EGFRNONAA >60.0 >60.0 >60.0   , CBC:   Recent Labs  Lab 07/29/17  1604  07/30/17  0400 07/31/17  0400   WBC 16.18* 15.28* 13.90*   HGB 9.1* 8.7* 9.8*   HCT 26.4* 25.3* 28.0*   PLT 98* 99* 138*    and INR:   Recent Labs  Lab 07/29/17  1604 07/30/17  0600 07/31/17  0554   INR 1.4* 1.2 1.1       Significant Imaging: Echocardiogram:   2D echo with color flow doppler:   Results for orders placed or performed during the hospital encounter of 07/18/17   2D echo with color flow doppler   Result Value Ref Range    Est. PA Systolic Pressure 20.43      Assessment and Plan:   Mr. Hayden is a 67 year old man with advanced NICM (LVEF 10%) on home , HTN, HLD with [employer] CRT-D who presented 7/18 morning with a syncopal episode during a 6MWT followed by an ICD shock x 2. On interrogation, he had been shocked 5 times since 7/14, and 4 times in last two days prior to admit. He is now s/p HMIII LVAD and RV lead on ICD is not pacing.    AICD (automatic cardioverter/defibrillator) present    -bridging to coumadin, INR 1.1. Cannot perform while still on heparin due to bleeding risk so need to await therapeutic INR before any interventions  -Patient needs RV lead replacement vs revision given persistent dysfunction of RV lead  -ICD is off but patient has pads to chest wall in case it is needed  -Has LVAD (HMIII) in place at this time                      Casey Newman MD  Cardiac Electrophysiology  [facility]

## 2017-07-31 NOTE — ASSESSMENT & PLAN NOTE
-bridging to coumadin, INR 1.1. Cannot perform while still on heparin due to bleeding risk so need to await therapeutic INR before any interventions  -Patient needs RV lead replacement vs revision given persistent dysfunction of RV lead  -ICD is off but patient has pads to chest wall in case it is needed  -Has LVAD (HMIII) in place at this time

## 2017-07-31 NOTE — SUBJECTIVE & OBJECTIVE
Interval History: device interrogated this AM at bedside showing worsening function of RV lead.    Review of Systems   Constitution: Negative.   HENT: Negative.    Eyes: Negative.    Cardiovascular: Negative.    Respiratory: Negative.    Endocrine: Negative.    Skin: Negative.    Musculoskeletal: Negative.    Gastrointestinal: Negative.    Genitourinary: Negative.    Neurological: Negative.      Objective:     Vital Signs (Most Recent):  Temp: 98 °F (36.7 °C) (07/31/17 1100)  Pulse: 97 (07/31/17 1519)  Resp: (!) 32 (07/31/17 1519)  BP: (!) 80/0 (07/31/17 0300)  SpO2: 98 % (07/31/17 0722) Vital Signs (24h Range):  Temp:  [98 °F (36.7 °C)-98.9 °F (37.2 °C)] 98 °F (36.7 °C)  Pulse:  [] 97  Resp:  [22-34] 32  SpO2:  [90 %-100 %] 98 %  BP: (80-82)/(0) 80/0  Arterial Line BP: (75-97)/(58-76) 81/62     Weight: 84.2 kg (185 lb 10 oz)  Body mass index is 28.22 kg/m².     SpO2: 98 %  O2 Device (Oxygen Therapy): nasal cannula w/ humidification    Physical Exam   Constitutional: He is oriented to person, place, and time. He appears well-developed and well-nourished.   HENT:   Head: Normocephalic and atraumatic.   Eyes: Conjunctivae and EOM are normal. Pupils are equal, round, and reactive to light.   Neck: Normal range of motion. Neck supple. No JVD present.   Cardiovascular:   Smooth vad sounds     Pulmonary/Chest: Effort normal and breath sounds normal. No respiratory distress. He has no wheezes. He has no rales. He exhibits no tenderness.   Abdominal: Soft. Bowel sounds are normal. He exhibits no distension. There is no tenderness.   Musculoskeletal: Normal range of motion. He exhibits no edema or tenderness.   Neurological: He is alert and oriented to person, place, and time.   Skin: Skin is warm and dry. No erythema. No pallor.       Significant Labs:   EP:   Recent Labs  Lab 07/29/17  1604  07/30/17  0400 07/30/17  0600  07/30/17  1801 07/31/17  0000 07/31/17  0400 07/31/17  0554   *  < >  --  136  < > 134*  133*  --  136   K 3.9  < >  --  3.8  < > 4.4 4.0  --  4.0     < >  --  103  < > 103 101  --  103   CO2 20*  < >  --  21*  < > 22* 21*  --  21*   *  < >  --  123*  < > 135* 139*  --  129*   BUN 26*  < >  --  28*  < > 30* 30*  --  31*   CREATININE 1.2  < >  --  1.1  < > 1.1 1.1  --  1.1   CALCIUM 8.6*  < >  --  8.6*  < > 8.6* 8.5*  --  8.4*   PROT  --   --   --   --   --   --   --   --  6.4   ALBUMIN  --   --   --   --   --   --   --   --  3.1*   BILITOT  --   --   --   --   --   --   --   --  2.2*   ALKPHOS  --   --   --   --   --   --   --   --  67   AST  --   --   --   --   --   --   --   --  43*   ALT  --   --   --   --   --   --   --   --  32   ANIONGAP 11  < >  --  12  < > 9 11  --  12   ESTGFRAFRICA >60.0  < >  --  >60.0  < > >60.0 >60.0  --  >60.0   EGFRNONAA >60.0  < >  --  >60.0  < > >60.0 >60.0  --  >60.0   WBC 16.18*  --  15.28*  --   --   --   --  13.90*  --    HGB 9.1*  --  8.7*  --   --   --   --  9.8*  --    HCT 26.4*  --  25.3*  --   --   --   --  28.0*  --    PLT 98*  --  99*  --   --   --   --  138*  --    INR 1.4*  --   --  1.2  --   --   --   --  1.1   < > = values in this interval not displayed., CMP:   Recent Labs  Lab 07/30/17  1801 07/31/17  0000 07/31/17  0554   * 133* 136   K 4.4 4.0 4.0    101 103   CO2 22* 21* 21*   * 139* 129*   BUN 30* 30* 31*   CREATININE 1.1 1.1 1.1   CALCIUM 8.6* 8.5* 8.4*   PROT  --   --  6.4   ALBUMIN  --   --  3.1*   BILITOT  --   --  2.2*   ALKPHOS  --   --  67   AST  --   --  43*   ALT  --   --  32   ANIONGAP 9 11 12   ESTGFRAFRICA >60.0 >60.0 >60.0   EGFRNONAA >60.0 >60.0 >60.0   , CBC:   Recent Labs  Lab 07/29/17  1604 07/30/17  0400 07/31/17  0400   WBC 16.18* 15.28* 13.90*   HGB 9.1* 8.7* 9.8*   HCT 26.4* 25.3* 28.0*   PLT 98* 99* 138*    and INR:   Recent Labs  Lab 07/29/17  1604 07/30/17  0600 07/31/17  0554   INR 1.4* 1.2 1.1       Significant Imaging: Echocardiogram:   2D echo with color flow doppler:   Results for orders  placed or performed during the hospital encounter of 07/18/17   2D echo with color flow doppler   Result Value Ref Range    Est. PA Systolic Pressure 20.43

## 2017-07-31 NOTE — PT/OT/SLP PROGRESS
Occupational Therapy  Treatment    Suman Hayden   MRN: [id number]   Admitting Diagnosis: Acute on chronic combined systolic and diastolic heart failure    OT Date of Treatment: 07/31/17  2 Therapy encounters this day   OT Start Time: 0805  OT Stop Time: 0835   OT start Time: 0932  OT end Time: 1002    OT Total Time (min): 60min    Billable Minutes:  Self Care/Home Management 30 and Therapeutic Activity 30    General Precautions: Standard, fall, LVAD, Norman Carmen catheter (PA cordis), sternal    Subjective:  Communicated with RN prior to session, post session handoff discussed progress and plan of care   Pain/Comfort  Pain Rating 1: 0/10    Objective:  Patient found reclined in bed with: arterial line, blood pressure cuff, central line, delgado catheter, chest tube, peripheral IV, telemetry, pulse ox (continuous) (swanz- ganze, LVAD). Spouse at bedside on 2nd encounter     Functional Mobility:  Bed Mobility:  Rolling/Turning Right: Minimum assistance  Supine to Sit via log rolling technique: Moderate Assistance  Sit to Supine via log rolling technique: Maximum Assistance    Transfers:   Sit <> Stand Assistance: Moderate Assistance  Sit <> Stand Assistive Device: No Assistive Device  Bed <> Chair Technique: Stand Pivot  Bed <> Chair Transfer Assistance x2 : Moderate Assistance  Bed <> Chair Assistive Device: No Assistive Device    Activities of Daily Living:  UE Dressing Level of Assistance: Minimum assistance  LE Dressing Level of Assistance: Total assistance     Balance:   Static Sit: GOOD-: Takes MODERATE challenges from all directions but inconsistently  Dynamic Sit: GOOD-: Maintains balance through MODERATE excursions of active trunk movement,     Static Stand: FAIR : Takes MINIMAL challenges from all directions  Dynamic stand: FAIR- : Needs CONTACT GUARD during gait    Therapeutic Activities and Exercises:  Bed mobility, sit to stand, Bed <> chair, Chair <> Bed transfer training  Worked on switching LVAD from PBU power  "to battery, and putting battery in battery clips    AM-PAC 6 CLICK ADL   How much help from another person does this patient currently need?   1 = Unable, Total/Dependent Assistance  2 = A lot, Maximum/Moderate Assistance  3 = A little, Minimum/Contact Guard/Supervision  4 = None, Modified [place]/Independent    Putting on and taking off regular lower body clothing? : 1  Bathing (including washing, rinsing, drying)?: 1  Toileting, which includes using toilet, bedpan, or urinal? : 1  Putting on and taking off regular upper body clothing?: 3  Taking care of personal grooming such as brushing teeth?: 3  Eating meals?: 3  Total Score: 12     AM-PAC Raw Score CMS "G-Code Modifier Level of Impairment Assistance   6 % Total / Unable   7 - 8 CM 80 - 100% Maximal Assist   9-13 CL 60 - 80% Moderate Assist   14 - 19 CK 40 - 60% Moderate Assist   20 - 22 CJ 20 - 40% Minimal Assist   23 CI 1-20% SBA / CGA   24 CH 0% Independent/ Mod I       1st encounter patient left up in chair, RN notified  2nd attempt Patient left supine with all lines intact, call button in reach, RN  notified and spouse present    ASSESSMENT:  Suman Hayden is a 67 y.o. male with a medical diagnosis of Acute on chronic combined systolic and diastolic heart failure and presents with decreased activity tolerance and generalized weakness.  Pt with poor recall of LVAD education from last session. Pt practiced switching to/from batteries, inserting batteries in battery clips. Pt needed moderate cues and repetition from OT. Pt's Spouse present and demo good understanding of education provided.     Rehab identified problem list/impairments: Rehab identified problem list/impairments: weakness, impaired endurance, gait instability, impaired functional mobilty, impaired self care skills, impaired balance, impaired cardiopulmonary response to activity    Rehab potential is good.    Activity tolerance: Good    Discharge recommendations: Discharge " Facility/Level Of Care Needs: home with home health     Barriers to discharge: None at this time     Equipment recommendations:  (TBD closer to d/c)     GOALS:    Occupational Therapy Goals        Problem: Occupational Therapy Goal    Goal Priority Disciplines Outcome Interventions   Occupational Therapy Goal     OT, PT/OT     Description:  Goals to be met by:  2 weeks 8/12/17     Patient will increase functional independence with ADLs by performing:  Feeding: Independent   UE Dressing with Supervision.  LE Dressing with Supervision.  Grooming while standing with Supervision.  Toileting from toilet with Supervision for hygiene and clothing management.   Stand pivot transfers with Supervision.  Toilet transfer to toilet with Supervision.  Pt will be independent with LVAD yasmin't.                 Multidisciplinary Problems (Resolved)        Problem: Occupational Therapy Goal    Goal Priority Disciplines Outcome Interventions   Occupational Therapy Goal   (Resolved)     OT, PT/OT  Error    Description:  Goals to be met by: 8/04/2017    Patient will increase functional independence with ADLs by performing:    Increased functional strength to 5/5 for improved ADL performance.  Upper extremity exercise program and R LE ankle pumps  3x 10 reps per handout, with independence.                      Plan:  Patient to be seen 6 x/week to address the above listed problems via self-care/home management, therapeutic activities, therapeutic exercises  Plan of Care expires: 08/04/17  Plan of Care reviewed with: patient, spouse         Beatriz Dubose OT  07/31/2017

## 2017-08-01 LAB
ALLENS TEST: ABNORMAL
ALLENS TEST: ABNORMAL
ANION GAP SERPL CALC-SCNC: 11 MMOL/L
ANION GAP SERPL CALC-SCNC: 11 MMOL/L
ANION GAP SERPL CALC-SCNC: 12 MMOL/L
ANION GAP SERPL CALC-SCNC: 12 MMOL/L
ANION GAP SERPL CALC-SCNC: 13 MMOL/L
ANISOCYTOSIS BLD QL SMEAR: SLIGHT
APTT BLDCRRT: 43.1 SEC
APTT BLDCRRT: 44.1 SEC
APTT BLDCRRT: 44.1 SEC
APTT BLDCRRT: 46.3 SEC
APTT BLDCRRT: 47.2 SEC
BASOPHILS # BLD AUTO: 0.01 K/UL
BASOPHILS NFR BLD: 0.1 %
BUN SERPL-MCNC: 38 MG/DL
BUN SERPL-MCNC: 38 MG/DL
BUN SERPL-MCNC: 39 MG/DL
BUN SERPL-MCNC: 39 MG/DL
BUN SERPL-MCNC: 41 MG/DL
CALCIUM SERPL-MCNC: 8.5 MG/DL
CALCIUM SERPL-MCNC: 8.5 MG/DL
CALCIUM SERPL-MCNC: 8.6 MG/DL
CALCIUM SERPL-MCNC: 8.8 MG/DL
CALCIUM SERPL-MCNC: 8.9 MG/DL
CHLORIDE SERPL-SCNC: 100 MMOL/L
CHLORIDE SERPL-SCNC: 101 MMOL/L
CHLORIDE SERPL-SCNC: 102 MMOL/L
CHLORIDE SERPL-SCNC: 102 MMOL/L
CHLORIDE SERPL-SCNC: 99 MMOL/L
CO2 SERPL-SCNC: 21 MMOL/L
CO2 SERPL-SCNC: 23 MMOL/L
CO2 SERPL-SCNC: 24 MMOL/L
CREAT SERPL-MCNC: 1.5 MG/DL
CREAT SERPL-MCNC: 1.5 MG/DL
CREAT SERPL-MCNC: 1.6 MG/DL
CREAT SERPL-MCNC: 1.6 MG/DL
CREAT SERPL-MCNC: 1.7 MG/DL
DELSYS: ABNORMAL
DIFFERENTIAL METHOD: ABNORMAL
EOSINOPHIL # BLD AUTO: 0.1 K/UL
EOSINOPHIL NFR BLD: 0.6 %
ERYTHROCYTE [DISTWIDTH] IN BLOOD BY AUTOMATED COUNT: 15.4 %
EST. GFR  (AFRICAN AMERICAN): 47.2 ML/MIN/1.73 M^2
EST. GFR  (AFRICAN AMERICAN): 50.8 ML/MIN/1.73 M^2
EST. GFR  (AFRICAN AMERICAN): 50.8 ML/MIN/1.73 M^2
EST. GFR  (AFRICAN AMERICAN): 54.9 ML/MIN/1.73 M^2
EST. GFR  (AFRICAN AMERICAN): 54.9 ML/MIN/1.73 M^2
EST. GFR  (NON AFRICAN AMERICAN): 40.8 ML/MIN/1.73 M^2
EST. GFR  (NON AFRICAN AMERICAN): 43.9 ML/MIN/1.73 M^2
EST. GFR  (NON AFRICAN AMERICAN): 43.9 ML/MIN/1.73 M^2
EST. GFR  (NON AFRICAN AMERICAN): 47.5 ML/MIN/1.73 M^2
EST. GFR  (NON AFRICAN AMERICAN): 47.5 ML/MIN/1.73 M^2
ESTIMATED PA SYSTOLIC PRESSURE: 33.87
GLUCOSE SERPL-MCNC: 113 MG/DL
GLUCOSE SERPL-MCNC: 118 MG/DL
GLUCOSE SERPL-MCNC: 137 MG/DL
GLUCOSE SERPL-MCNC: 137 MG/DL
GLUCOSE SERPL-MCNC: 144 MG/DL
HCO3 UR-SCNC: 22.6 MMOL/L (ref 24–28)
HCO3 UR-SCNC: 24.1 MMOL/L (ref 24–28)
HCT VFR BLD AUTO: 26.5 %
HGB BLD-MCNC: 9.4 G/DL
HYPOCHROMIA BLD QL SMEAR: ABNORMAL
INR PPP: 1.1
LDH SERPL L TO P-CCNC: 345 U/L
LYMPHOCYTES # BLD AUTO: 1.5 K/UL
LYMPHOCYTES NFR BLD: 13.6 %
MAGNESIUM SERPL-MCNC: 2.1 MG/DL
MAGNESIUM SERPL-MCNC: 2.3 MG/DL
MAGNESIUM SERPL-MCNC: 2.5 MG/DL
MCH RBC QN AUTO: 28.6 PG
MCHC RBC AUTO-ENTMCNC: 35.5 G/DL
MCV RBC AUTO: 81 FL
METHEMOGLOBIN: 0.7 % (ref 0–3)
MODE: ABNORMAL
MONOCYTES # BLD AUTO: 1.5 K/UL
MONOCYTES NFR BLD: 12.8 %
NEUTROPHILS # BLD AUTO: 8.3 K/UL
NEUTROPHILS NFR BLD: 72.9 %
OVALOCYTES BLD QL SMEAR: ABNORMAL
PCO2 BLDA: 32.3 MMHG (ref 35–45)
PCO2 BLDA: 35.8 MMHG (ref 35–45)
PH SMN: 7.44 [PH] (ref 7.35–7.45)
PH SMN: 7.45 [PH] (ref 7.35–7.45)
PHOSPHATE SERPL-MCNC: 2.8 MG/DL
PLATELET # BLD AUTO: 182 K/UL
PMV BLD AUTO: 10.8 FL
PO2 BLDA: 33 MMHG (ref 40–60)
PO2 BLDA: 81 MMHG (ref 80–100)
POC BE: -1 MMOL/L
POC BE: 0 MMOL/L
POC SATURATED O2: 67 % (ref 95–100)
POC SATURATED O2: 97 % (ref 95–100)
POC TCO2: 24 MMOL/L (ref 23–27)
POC TCO2: 25 MMOL/L (ref 24–29)
POCT GLUCOSE: 165 MG/DL (ref 70–110)
POIKILOCYTOSIS BLD QL SMEAR: SLIGHT
POLYCHROMASIA BLD QL SMEAR: ABNORMAL
POTASSIUM SERPL-SCNC: 3.8 MMOL/L
POTASSIUM SERPL-SCNC: 3.9 MMOL/L
POTASSIUM SERPL-SCNC: 3.9 MMOL/L
POTASSIUM SERPL-SCNC: 4.1 MMOL/L
POTASSIUM SERPL-SCNC: 4.3 MMOL/L
PROTHROMBIN TIME: 11.6 SEC
RBC # BLD AUTO: 3.29 M/UL
SAMPLE: ABNORMAL
SAMPLE: ABNORMAL
SITE: ABNORMAL
SITE: ABNORMAL
SODIUM SERPL-SCNC: 134 MMOL/L
SODIUM SERPL-SCNC: 134 MMOL/L
SODIUM SERPL-SCNC: 135 MMOL/L
TRICUSPID VALVE REGURGITATION: NORMAL
WBC # BLD AUTO: 11.36 K/UL

## 2017-08-01 PROCEDURE — 97803 MED NUTRITION INDIV SUBSEQ: CPT

## 2017-08-01 PROCEDURE — 85610 PROTHROMBIN TIME: CPT

## 2017-08-01 PROCEDURE — 93463 DRUG ADMIN & HEMODYNMIC MEAS: CPT

## 2017-08-01 PROCEDURE — 27000248 HC VAD-ADDITIONAL DAY

## 2017-08-01 PROCEDURE — 83735 ASSAY OF MAGNESIUM: CPT

## 2017-08-01 PROCEDURE — 93750 INTERROGATION VAD IN PERSON: CPT | Mod: ,,, | Performed by: THORACIC SURGERY (CARDIOTHORACIC VASCULAR SURGERY)

## 2017-08-01 PROCEDURE — 85025 COMPLETE CBC W/AUTO DIFF WBC: CPT

## 2017-08-01 PROCEDURE — 84100 ASSAY OF PHOSPHORUS: CPT

## 2017-08-01 PROCEDURE — 83050 HGB METHEMOGLOBIN QUAN: CPT

## 2017-08-01 PROCEDURE — 94761 N-INVAS EAR/PLS OXIMETRY MLT: CPT

## 2017-08-01 PROCEDURE — 25000003 PHARM REV CODE 250: Performed by: STUDENT IN AN ORGANIZED HEALTH CARE EDUCATION/TRAINING PROGRAM

## 2017-08-01 PROCEDURE — 80048 BASIC METABOLIC PNL TOTAL CA: CPT | Mod: 91

## 2017-08-01 PROCEDURE — A4216 STERILE WATER/SALINE, 10 ML: HCPCS | Performed by: THORACIC SURGERY (CARDIOTHORACIC VASCULAR SURGERY)

## 2017-08-01 PROCEDURE — 97116 GAIT TRAINING THERAPY: CPT

## 2017-08-01 PROCEDURE — 94799 UNLISTED PULMONARY SVC/PX: CPT

## 2017-08-01 PROCEDURE — 83615 LACTATE (LD) (LDH) ENZYME: CPT

## 2017-08-01 PROCEDURE — C1751 CATH, INF, PER/CENT/MIDLINE: HCPCS

## 2017-08-01 PROCEDURE — 83735 ASSAY OF MAGNESIUM: CPT | Mod: 91

## 2017-08-01 PROCEDURE — 80048 BASIC METABOLIC PNL TOTAL CA: CPT

## 2017-08-01 PROCEDURE — 85730 THROMBOPLASTIN TIME PARTIAL: CPT

## 2017-08-01 PROCEDURE — 85730 THROMBOPLASTIN TIME PARTIAL: CPT | Mod: 91

## 2017-08-01 PROCEDURE — 97535 SELF CARE MNGMENT TRAINING: CPT

## 2017-08-01 PROCEDURE — 63600175 PHARM REV CODE 636 W HCPCS: Performed by: STUDENT IN AN ORGANIZED HEALTH CARE EDUCATION/TRAINING PROGRAM

## 2017-08-01 PROCEDURE — 92526 ORAL FUNCTION THERAPY: CPT

## 2017-08-01 PROCEDURE — 36569 INSJ PICC 5 YR+ W/O IMAGING: CPT

## 2017-08-01 PROCEDURE — 76937 US GUIDE VASCULAR ACCESS: CPT

## 2017-08-01 PROCEDURE — 99233 SBSQ HOSP IP/OBS HIGH 50: CPT | Mod: ,,, | Performed by: ANESTHESIOLOGY

## 2017-08-01 PROCEDURE — 99233 SBSQ HOSP IP/OBS HIGH 50: CPT | Mod: 24,,, | Performed by: THORACIC SURGERY (CARDIOTHORACIC VASCULAR SURGERY)

## 2017-08-01 PROCEDURE — 93306 TTE W/DOPPLER COMPLETE: CPT

## 2017-08-01 PROCEDURE — 27000221 HC OXYGEN, UP TO 24 HOURS

## 2017-08-01 PROCEDURE — 82803 BLOOD GASES ANY COMBINATION: CPT

## 2017-08-01 PROCEDURE — 25000003 PHARM REV CODE 250: Performed by: THORACIC SURGERY (CARDIOTHORACIC VASCULAR SURGERY)

## 2017-08-01 PROCEDURE — 20000000 HC ICU ROOM

## 2017-08-01 PROCEDURE — 37799 UNLISTED PX VASCULAR SURGERY: CPT

## 2017-08-01 PROCEDURE — 99233 SBSQ HOSP IP/OBS HIGH 50: CPT | Mod: ,,, | Performed by: INTERNAL MEDICINE

## 2017-08-01 PROCEDURE — 93306 TTE W/DOPPLER COMPLETE: CPT | Mod: 26,,, | Performed by: INTERNAL MEDICINE

## 2017-08-01 PROCEDURE — A4216 STERILE WATER/SALINE, 10 ML: HCPCS | Performed by: STUDENT IN AN ORGANIZED HEALTH CARE EDUCATION/TRAINING PROGRAM

## 2017-08-01 RX ORDER — POTASSIUM CHLORIDE 29.8 MG/ML
40 INJECTION INTRAVENOUS ONCE
Status: COMPLETED | OUTPATIENT
Start: 2017-08-01 | End: 2017-08-01

## 2017-08-01 RX ORDER — WARFARIN 2 MG/1
4 TABLET ORAL ONCE
Status: COMPLETED | OUTPATIENT
Start: 2017-08-01 | End: 2017-08-01

## 2017-08-01 RX ORDER — SODIUM CHLORIDE 0.9 % (FLUSH) 0.9 %
10 SYRINGE (ML) INJECTION EVERY 6 HOURS
Status: DISCONTINUED | OUTPATIENT
Start: 2017-08-01 | End: 2017-08-31

## 2017-08-01 RX ORDER — BISACODYL 10 MG
10 SUPPOSITORY, RECTAL RECTAL ONCE
Status: COMPLETED | OUTPATIENT
Start: 2017-08-01 | End: 2017-08-01

## 2017-08-01 RX ORDER — AMLODIPINE BESYLATE 10 MG/1
10 TABLET ORAL DAILY
Status: DISCONTINUED | OUTPATIENT
Start: 2017-08-02 | End: 2017-08-03

## 2017-08-01 RX ORDER — SODIUM CHLORIDE 0.9 % (FLUSH) 0.9 %
10 SYRINGE (ML) INJECTION
Status: DISCONTINUED | OUTPATIENT
Start: 2017-08-01 | End: 2017-08-31

## 2017-08-01 RX ORDER — HYDRALAZINE HYDROCHLORIDE 20 MG/ML
10 INJECTION INTRAMUSCULAR; INTRAVENOUS EVERY 6 HOURS PRN
Status: DISCONTINUED | OUTPATIENT
Start: 2017-08-01 | End: 2017-08-16

## 2017-08-01 RX ORDER — AMLODIPINE BESYLATE 5 MG/1
5 TABLET ORAL DAILY
Status: DISCONTINUED | OUTPATIENT
Start: 2017-08-01 | End: 2017-08-01

## 2017-08-01 RX ADMIN — Medication 3 ML: at 09:08

## 2017-08-01 RX ADMIN — MAGNESIUM OXIDE TAB 400 MG (241.3 MG ELEMENTAL MG) 400 MG: 400 (241.3 MG) TAB at 09:08

## 2017-08-01 RX ADMIN — Medication 10 ML: at 05:08

## 2017-08-01 RX ADMIN — HYDRALAZINE HYDROCHLORIDE 10 MG: 20 INJECTION INTRAMUSCULAR; INTRAVENOUS at 11:08

## 2017-08-01 RX ADMIN — POTASSIUM CHLORIDE 20 MEQ: 750 CAPSULE, EXTENDED RELEASE ORAL at 09:08

## 2017-08-01 RX ADMIN — BISACODYL 10 MG: 10 SUPPOSITORY RECTAL at 05:08

## 2017-08-01 RX ADMIN — MAGNESIUM OXIDE TAB 400 MG (241.3 MG ELEMENTAL MG) 400 MG: 400 (241.3 MG) TAB at 06:08

## 2017-08-01 RX ADMIN — FERROUS GLUCONATE TAB 324 MG (37.5 MG ELEMENTAL IRON) 324 MG: 324 (37.5 FE) TAB at 08:08

## 2017-08-01 RX ADMIN — WARFARIN SODIUM 4 MG: 2 TABLET ORAL at 05:08

## 2017-08-01 RX ADMIN — PRAVASTATIN SODIUM 20 MG: 20 TABLET ORAL at 09:08

## 2017-08-01 RX ADMIN — AMIODARONE HYDROCHLORIDE 400 MG: 200 TABLET ORAL at 09:08

## 2017-08-01 RX ADMIN — HYDRALAZINE HYDROCHLORIDE 10 MG: 20 INJECTION INTRAMUSCULAR; INTRAVENOUS at 05:08

## 2017-08-01 RX ADMIN — POLYETHYLENE GLYCOL 3350 17 G: 17 POWDER, FOR SOLUTION ORAL at 09:08

## 2017-08-01 RX ADMIN — DOCUSATE SODIUM 200 MG: 100 CAPSULE, LIQUID FILLED ORAL at 09:08

## 2017-08-01 RX ADMIN — CHLOROTHIAZIDE SODIUM 500 MG: 500 INJECTION, POWDER, LYOPHILIZED, FOR SOLUTION INTRAVENOUS at 09:08

## 2017-08-01 RX ADMIN — FUROSEMIDE 20 MG/HR: 10 INJECTION, SOLUTION INTRAMUSCULAR; INTRAVENOUS at 09:08

## 2017-08-01 RX ADMIN — MUPIROCIN: 20 OINTMENT TOPICAL at 09:08

## 2017-08-01 RX ADMIN — ONDANSETRON 4 MG: 2 INJECTION INTRAMUSCULAR; INTRAVENOUS at 05:08

## 2017-08-01 RX ADMIN — HEPARIN SODIUM AND DEXTROSE 1100 UNITS/HR: 10000; 5 INJECTION INTRAVENOUS at 09:08

## 2017-08-01 RX ADMIN — POTASSIUM CHLORIDE 40 MEQ: 400 INJECTION, SOLUTION INTRAVENOUS at 03:08

## 2017-08-01 RX ADMIN — ASPIRIN 325 MG: 325 TABLET, DELAYED RELEASE ORAL at 09:08

## 2017-08-01 RX ADMIN — AMLODIPINE BESYLATE 5 MG: 5 TABLET ORAL at 09:08

## 2017-08-01 RX ADMIN — Medication 3 ML: at 06:08

## 2017-08-01 RX ADMIN — NICARDIPINE HYDROCHLORIDE 5 MG/HR: 0.2 INJECTION, SOLUTION INTRAVENOUS at 04:08

## 2017-08-01 RX ADMIN — PANTOPRAZOLE SODIUM 40 MG: 40 TABLET, DELAYED RELEASE ORAL at 09:08

## 2017-08-01 NOTE — SUBJECTIVE & OBJECTIVE
Interval History: no acute changes overnight    Review of Systems   Constitution: Negative.   HENT: Negative.    Eyes: Negative.    Cardiovascular: Negative.    Respiratory: Negative.    Endocrine: Negative.    Skin: Negative.    Musculoskeletal: Negative.    Gastrointestinal: Negative.    Genitourinary: Negative.    Neurological: Negative.      Objective:     Vital Signs (Most Recent):  Temp: 97.9 °F (36.6 °C) (08/01/17 0700)  Pulse: 100 (08/01/17 0930)  Resp: (!) 26 (08/01/17 0930)  BP: (!) 105/53 (08/01/17 0730)  SpO2: (!) 81 % (08/01/17 0730) Vital Signs (24h Range):  Temp:  [97.9 °F (36.6 °C)-98.4 °F (36.9 °C)] 97.9 °F (36.6 °C)  Pulse:  [] 100  Resp:  [23-34] 26  SpO2:  [81 %] 81 %  BP: ()/(0-53) 105/53  Arterial Line BP: (81-98)/(47-76) 90/69     Weight: 84.9 kg (187 lb 2.7 oz)  Body mass index is 28.46 kg/m².     SpO2: (!) 81 %  O2 Device (Oxygen Therapy): nasal cannula w/ humidification    Physical Exam   Constitutional: He is oriented to person, place, and time. He appears well-developed and well-nourished.   HENT:   Head: Normocephalic and atraumatic.   Eyes: Conjunctivae and EOM are normal. Pupils are equal, round, and reactive to light.   Neck: Normal range of motion. Neck supple. No JVD present.   Cardiovascular:   Smooth vad sounds     Pulmonary/Chest: Effort normal and breath sounds normal. No respiratory distress. He has no wheezes. He has no rales. He exhibits no tenderness.   Abdominal: Soft. Bowel sounds are normal. He exhibits no distension. There is no tenderness.   Musculoskeletal: Normal range of motion. He exhibits no edema or tenderness.   Neurological: He is alert and oriented to person, place, and time.   Skin: Skin is warm and dry. No erythema. No pallor.       Significant Labs:   EP:   Recent Labs  Lab 07/31/17  0400 07/31/17  0554 07/31/17  1630 07/31/17  2221 08/01/17  0405   NA  --  136 135* 135* 135*  135*   K  --  4.0 4.0 4.1 3.9  3.9   CL  --  103 102 102 102  102    CO2  --  21* 23 22* 21*  21*   GLU  --  129* 126* 134* 137*  137*   BUN  --  31* 34* 35* 38*  38*   CREATININE  --  1.1 1.3 1.3 1.5*  1.5*   CALCIUM  --  8.4* 8.5* 8.4* 8.5*  8.5*   PROT  --  6.4  --   --   --    ALBUMIN  --  3.1*  --   --   --    BILITOT  --  2.2*  --   --   --    ALKPHOS  --  67  --   --   --    AST  --  43*  --   --   --    ALT  --  32  --   --   --    ANIONGAP  --  12 10 11 12  12   ESTGFRAFRICA  --  >60.0 >60.0 >60.0 54.9*  54.9*   EGFRNONAA  --  >60.0 56.5* 56.5* 47.5*  47.5*   WBC 13.90*  --   --   --  11.36   HGB 9.8*  --   --   --  9.4*   HCT 28.0*  --   --   --  26.5*   *  --   --   --  182   INR  --  1.1  --   --  1.1   , CMP:   Recent Labs  Lab 07/31/17  0554 07/31/17  1630 07/31/17  2221 08/01/17  0405    135* 135* 135*  135*   K 4.0 4.0 4.1 3.9  3.9    102 102 102  102   CO2 21* 23 22* 21*  21*   * 126* 134* 137*  137*   BUN 31* 34* 35* 38*  38*   CREATININE 1.1 1.3 1.3 1.5*  1.5*   CALCIUM 8.4* 8.5* 8.4* 8.5*  8.5*   PROT 6.4  --   --   --    ALBUMIN 3.1*  --   --   --    BILITOT 2.2*  --   --   --    ALKPHOS 67  --   --   --    AST 43*  --   --   --    ALT 32  --   --   --    ANIONGAP 12 10 11 12  12   ESTGFRAFRICA >60.0 >60.0 >60.0 54.9*  54.9*   EGFRNONAA >60.0 56.5* 56.5* 47.5*  47.5*   , CBC:   Recent Labs  Lab 07/31/17  0400 08/01/17  0405   WBC 13.90* 11.36   HGB 9.8* 9.4*   HCT 28.0* 26.5*   * 182    and INR:   Recent Labs  Lab 07/31/17  0554 08/01/17  0405   INR 1.1 1.1       Significant Imaging: Echocardiogram:   2D echo with color flow doppler:   Results for orders placed or performed during the hospital encounter of 07/18/17   2D echo with color flow doppler   Result Value Ref Range    Est. PA Systolic Pressure 20.43

## 2017-08-01 NOTE — PROCEDURES
"Suman Hayden is a 67 y.o. male patient.    Temp: 97.9 °F (36.6 °C) (08/01/17 0700)  Pulse: 101 (08/01/17 1600)  Resp: (!) 28 (08/01/17 1600)  BP: (!) 90/0 (08/01/17 1500)  SpO2: 98 % (07/31/17 0722)  Weight: 84.9 kg (187 lb 2.7 oz) (08/01/17 0500)  Height: 5' 8" (172.7 cm) (07/28/17 0700)    PICC  Date/Time: 8/1/2017 5:07 PM  Performed by: CHEYANNE SOARES  Consent Done: Yes  Time out: Immediately prior to procedure a time out was called to verify the correct patient, procedure, equipment, support staff and site/side marked as required  Indications: med administration and vascular access  Anesthesia: local infiltration  Local anesthetic: lidocaine 1% without epinephrine  Anesthetic Total (mL): 2  Preparation: skin prepped with ChloraPrep  Skin prep agent dried: skin prep agent completely dried prior to procedure  Sterile barriers: all five maximum sterile barriers used - cap, mask, sterile gown, sterile gloves, and large sterile sheet  Hand hygiene: hand hygiene performed prior to central venous catheter insertion  Location details: right brachial  Catheter type: double lumen  Catheter size: 5 Fr  Catheter Length: 36cm    Ultrasound guidance: yes  Vessel Caliber: medium and patent, compressibility normal  Needle advanced into vessel with real time Ultrasound guidance.  Guidewire confirmed in vessel.  Image recorded and saved.  Sterile sheath used.  Number of attempts: 1  Post-procedure: blood return through all ports, chlorhexidine patch and sterile dressing applied  Technical procedures used: 3CG  Specimens: No  Implants: No  Assessment: placement verified by x-ray  Complications: none  Comments: PICC exchanged over wire.        Cheyanne Soares  8/1/2017  "

## 2017-08-01 NOTE — CONSULTS
Double lumen PICC placed in right brachial vein of LARON, 36cm in length with 0cm exposed and 31cm arm circumference. Lot#[id number].

## 2017-08-01 NOTE — PROGRESS NOTES
[facility]  Heart Transplant  Progress Note    Patient Name: Suman Hayden  MRN: [id number]  Admission Date: 7/18/2017  Hospital Length of Stay: 14 days  Attending Physician: Sunny Downing MD  Primary Care Provider: Joe Ernst MD  Principal Problem:Acute on chronic combined systolic and diastolic heart failure    Subjective:     Interval History: patient less nauseated today, in bed     Continuous Infusions:   sodium chloride 0.9% 10 mL/hr at 07/27/17 1451    amiodarone Stopped (07/31/17 1600)    dextrose 5 % and 0.9 % NaCl 3 mL/hr at 08/01/17 0700    DOBUTamine 5 mcg/kg/min (08/01/17 1200)    epinephrine infusion (NON-TITRATING) 0.04 mcg/kg/min (08/01/17 1200)    furosemide (LASIX) 1 mg/mL infusion (non-titrating) 20 mg/hr (08/01/17 1200)    heparin (porcine) in 5 % dex 1,100 Units/hr (08/01/17 1200)    nicardipine 1 mg/hr (08/01/17 1200)    nitric oxide gas       Scheduled Meds:   amiodarone  400 mg Oral BID    amlodipine  5 mg Oral Daily    aspirin  325 mg Oral Daily    docusate sodium  200 mg Oral QHS    ferrous gluconate  324 mg Oral Daily with breakfast    magnesium oxide  400 mg Oral TID    mupirocin   Nasal BID    pantoprazole  40 mg Oral Daily    polyethylene glycol  17 g Oral BID    potassium chloride  20 mEq Oral BID    pravastatin  20 mg Oral QHS    sodium chloride 0.9%  10 mL Intravenous Q6H    sodium chloride 0.9%  3 mL Intravenous Q8H    warfarin  4 mg Oral Once     PRN Meds:albumin human 5%, albuterol sulfate, bisacodyl, fentaNYL, magnesium hydroxide 400 mg/5 ml, magnesium sulfate IVPB, ondansetron, oxycodone, oxycodone, potassium chloride, potassium chloride, Flushing PICC Protocol **AND** sodium chloride 0.9% **AND** sodium chloride 0.9%, sodium phosphate IVPB, sodium phosphate IVPB, sodium phosphate IVPB    Review of patient's allergies indicates:  No Known Allergies  Objective:     Vital Signs (Most Recent):  Temp: 97.9 °F (36.6 °C) (08/01/17  0700)  Pulse: 99 (08/01/17 1215)  Resp: (!) 27 (08/01/17 1215)  BP: (!) 105/53 (08/01/17 0730)  SpO2: (!) 81 % (08/01/17 0730) Vital Signs (24h Range):  Temp:  [97.9 °F (36.6 °C)-98.4 °F (36.9 °C)] 97.9 °F (36.6 °C)  Pulse:  [] 99  Resp:  [20-34] 27  SpO2:  [81 %] 81 %  BP: ()/(0-53) 105/53  Arterial Line BP: (73-98)/(47-76) 77/60     Weight: 84.9 kg (187 lb 2.7 oz)  Body mass index is 28.46 kg/m².      Intake/Output Summary (Last 24 hours) at 08/01/17 1302  Last data filed at 08/01/17 1200   Gross per 24 hour   Intake             2664 ml   Output             2190 ml   Net              474 ml       Hemodynamic Parameters:  PAP: (36-57)/(17-29) 50/23  PAP (Mean):  [26 mmHg-39 mmHg] 33 mmHg  CO:  [5.1 L/min-6.5 L/min] 6.5 L/min  CI:  [2.6 L/min/m2-3.4 L/min/m2] 3.3 L/min/m2        Physical Exam   Constitutional: He appears well-developed and well-nourished. He is sedated.   HENT:   Head: Normocephalic and atraumatic.   Eyes: EOM are normal. Pupils are equal, round, and reactive to light.   Neck: Neck supple. No tracheal deviation present.   Cardiovascular:   VAD hum   Pulmonary/Chest: Effort normal.   VAD hum   Abdominal: Soft. Bowel sounds are decreased.   Skin: Skin is warm and dry.   Midsternum incision open, dressing occlusive and intact    Psychiatric:            Significant Labs:  CBC:    Recent Labs  Lab 08/01/17  0405   WBC 11.36   RBC 3.29*   HGB 9.4*   HCT 26.5*      MCV 81*   MCH 28.6   MCHC 35.5     BNP:    Recent Labs  Lab 07/31/17  0400   BNP 1,389*     CMP:    Recent Labs  Lab 07/31/17  0554  08/01/17  1152   *  < > 144*   CALCIUM 8.4*  < > 8.6*   ALBUMIN 3.1*  --   --    PROT 6.4  --   --      < > 135*   K 4.0  < > 3.8   CO2 21*  < > 21*     < > 101   BUN 31*  < > 39*   CREATININE 1.1  < > 1.6*   ALKPHOS 67  --   --    ALT 32  --   --    AST 43*  --   --    BILITOT 2.2*  --   --    < > = values in this interval not displayed.   Coagulation:     Recent Labs  Lab  08/01/17  0405 08/01/17  1152   INR 1.1  --    APTT 44.1*  44.1* 43.1*     LDH:    Recent Labs  Lab 07/30/17  0400 07/31/17  0400 08/01/17  0405   * 380* 345*     Microbiology:  Microbiology Results (last 7 days)     Procedure Component Value Units Date/Time    Blood culture [[id number]] Collected:  07/24/17 1300    Order Status:  Completed Specimen:  Blood from Peripheral, Hand, Right Updated:  07/29/17 1812     Blood Culture, Routine No growth after 5 days.    Urine culture [[id number]]  (Susceptibility) Collected:  07/24/17 1139    Order Status:  Completed Specimen:  Urine from Urine, Clean Catch Updated:  07/29/17 1506     Urine Culture, Routine --     ENTEROCOCCUS FAECALIS  >100,000 cfu/ml  No other significant isolate      Blood culture [[id number]] Collected:  07/24/17 1100    Order Status:  Completed Specimen:  Blood from Peripheral, Antecubital, Left Updated:  07/29/17 1412     Blood Culture, Routine No growth after 5 days.          I have reviewed all pertinent labs within the past 24 hours.    Estimated Creatinine Clearance: 47.5 mL/min (based on Cr of 1.6).    Diagnostic Results:  I have reviewed and interpreted all pertinent imaging results/findings within the past 24 hours.    Assessment and Plan:     Atrial fibrillation    - c/w heparin gtt  - c/w amio gtt        LVAD (left ventricular assist device) present    - LVAD HM III  - Speed 5100  - no events          Urine culture positive    - patient now afebrile, monitor continue antibiotics per CTS         Atrial tachycardia    - [id number] - 3  - c/w amiodarone  - c/w heparin gtt        AICD discharge    - appropriate in the setting of VT aggravated by underlying AT/AFL (earlier during the admission)  - 7/28 - setting of AF undersense - device reprogrammed to VVI 80  - tachy therapy turned off  - on amio gtt  - EP to reassess device in AM - if issues remain, will schedule for lead revision        Hepatitis B core antibody positive since 2012    -  will defer liver biopsy as CTS not requiring it  - ID consult cleared the patient for sx  - case discussed with hepatology, low suspicion for liver involvement        V-tach    - s/p amiodarone reload - now on amio gtt        Hyperlipidemia    - c/w pravastatin        * Acute on chronic combined systolic and diastolic heart failure    - CTS Primary  - s/p LVAD HM III (7/27/17)  - chest closure (7/28/17)  - extubated (7/29/17)  - CVP 13  - on  5, epi 0.04, lasix 20  - off cardene, vasopressin gtt  - NO decreased to 2.5 PPM                MELISSA Long  Heart Transplant  [facility]

## 2017-08-01 NOTE — PT/OT/SLP PROGRESS
Occupational Therapy  Treatment    Suman Hayden   MRN: [id number]   Admitting Diagnosis: Acute on chronic combined systolic and diastolic heart failure    OT Date of Treatment: 08/01/17   OT Start Time: 1118  OT Stop Time: 1144  OT Total Time (min): 26 min    Billable Minutes:  Self Care/Home Management 26    General Precautions: Standard, fall, sternal, LVAD     Subjective:  Communicated with RN prior to session, post session handoff discussed progress and plan of care     Objective:   Patient found reclined in bed with: arterial line, blood pressure cuff, PICC line, central line, pulse ox (continuous), telemetry, peripheral IV, delgado catheter (LAVD, CVP, nirtic oxide)     Functional Mobility:  Bed Mobility:  Rolling/Turning Right: Moderate assistance (2 person assist)  Supine to Sit: Moderate Assistance, With assist of 2    Transfers:   Sit <> Stand Assistance: Minimal Assistance (2 person assist )  Sit <> Stand Assistive Device: No Assistive Device  Bed <> Chair Technique: Stand Pivot  Bed <> Chair Transfer Assistance: Moderate Assistance (2 person assist )    Functional Ambulation: Pt walked to and from bedside sink with Mod assist ( 2 person assist). Pt needed 1 seated rest break after walking ~ 7 feet to sink.     Activities of Daily Living:  UE Dressing Level of Assistance: Minimum assistance (donning gown)  Grooming Position: Standing, Standing at sink  Grooming Level of Assistance:  (supervision. washing mouth with mouthwash)      Balance:   Static Sit: GOOD-: Takes MODERATE challenges from all directions but inconsistently  Dynamic Sit: FAIR+: Maintains balance through MINIMAL excursions of active trunk motion  Static Stand: FAIR: Maintains without assist but unable to take challenges  Dynamic stand: Fair -     Therapeutic Activities and Exercises:  Bed mobility, sit to stand and bed to chair transfers with emphasis on maintaining sternal precautions, standing tolerance while completing functional tasks  Pt  "educated on role of OT, plan of care, safety awareness, DME, importance of out of bed activity, LVAD management        AM-PAC 6 CLICK ADL   How much help from another person does this patient currently need?   1 = Unable, Total/Dependent Assistance  2 = A lot, Maximum/Moderate Assistance  3 = A little, Minimum/Contact Guard/Supervision  4 = None, Modified [place]/Independent    Putting on and taking off regular lower body clothing? : 1  Bathing (including washing, rinsing, drying)?: 1  Toileting, which includes using toilet, bedpan, or urinal? : 1  Putting on and taking off regular upper body clothing?: 3  Taking care of personal grooming such as brushing teeth?: 3  Eating meals?: 3  Total Score: 12     AM-PAC Raw Score CMS "G-Code Modifier Level of Impairment Assistance   6 % Total / Unable   7 - 8 CM 80 - 100% Maximal Assist   9-13 CL 60 - 80% Moderate Assist   14 - 19 CK 40 - 60% Moderate Assist   20 - 22 CJ 20 - 40% Minimal Assist   23 CI 1-20% SBA / CGA   24 CH 0% Independent/ Mod I       Patient left up in chair with all lines intact, call button in reach, RN notified and spouse present    ASSESSMENT:  Suman Hayden is a 67 y.o. male with a medical diagnosis of Acute on chronic combined systolic and diastolic heart failure and presents with decreased activity tolerance and generalized weakness impacting indep and safety with ADL/ Self care and functional mobility. Pt able to progress with functional mobility this day as he was able to walk 6 feet to bedside sink and complete light ADL tasks. Pt tolerated OT treatment well. Pt will benefit from skilled OT services to maximize independence and safety with ADL/Self care and functional mobility         Rehab identified problem list/impairments: Rehab identified problem list/impairments: weakness, impaired endurance, impaired self care skills, impaired functional mobilty, impaired balance, impaired cardiopulmonary response to activity    Rehab potential " is good.    Activity tolerance: Good    Discharge recommendations: Discharge Facility/Level Of Care Needs: home health PT     Barriers to discharge: Barriers to Discharge: None    Equipment recommendations:  (TBD closer to discharge)     GOALS:    Occupational Therapy Goals        Problem: Occupational Therapy Goal    Goal Priority Disciplines Outcome Interventions   Occupational Therapy Goal     OT, PT/OT Ongoing (interventions implemented as appropriate)    Description:  Goals to be met by:  2 weeks 8/12/17     Patient will increase functional independence with ADLs by performing:  Feeding: Independent   UE Dressing with Supervision.  LE Dressing with Supervision.  Grooming while standing with Supervision.  Toileting from toilet with Supervision for hygiene and clothing management.   Stand pivot transfers with Supervision.  Toilet transfer to toilet with Supervision.  Pt will be independent with LVAD yasmin't.                 Multidisciplinary Problems (Resolved)        Problem: Occupational Therapy Goal    Goal Priority Disciplines Outcome Interventions   Occupational Therapy Goal   (Resolved)     OT, PT/OT  Error    Description:  Goals to be met by: 8/04/2017    Patient will increase functional independence with ADLs by performing:    Increased functional strength to 5/5 for improved ADL performance.  Upper extremity exercise program and R LE ankle pumps  3x 10 reps per handout, with independence.                      Plan:  Patient to be seen 6 x/week to address the above listed problems via self-care/home management, therapeutic activities, therapeutic exercises  Plan of Care expires: 08/04/17  Plan of Care reviewed with: patient, spouse         Beatriz Dubose OT  08/01/2017

## 2017-08-01 NOTE — PROGRESS NOTES
Spoke to Dr. Clay regarding patient CVP flat =16/ 17. Urinary output approx 40cc/ hr. Orders received to administer 10mg lasix IV push and increase gtt to 25mg/hr. Informed that Cardene infusing at 5mg/hr.  Clarified epi titration; do not titrate at this time, continue as infusing at 0.04mcg/kg/min.

## 2017-08-01 NOTE — PROGRESS NOTES
[facility]  Cardiac Electrophysiology  Progress Note    Admission Date: 7/18/2017  Code Status: Prior   Attending Physician: Sunny Downing MD   Expected Discharge Date: 8/15/2017  Principal Problem:Acute on chronic combined systolic and diastolic heart failure    Subjective:     Interval History: no acute changes overnight    Review of Systems   Constitution: Negative.   HENT: Negative.    Eyes: Negative.    Cardiovascular: Negative.    Respiratory: Negative.    Endocrine: Negative.    Skin: Negative.    Musculoskeletal: Negative.    Gastrointestinal: Negative.    Genitourinary: Negative.    Neurological: Negative.      Objective:     Vital Signs (Most Recent):  Temp: 97.9 °F (36.6 °C) (08/01/17 0700)  Pulse: 100 (08/01/17 0930)  Resp: (!) 26 (08/01/17 0930)  BP: (!) 105/53 (08/01/17 0730)  SpO2: (!) 81 % (08/01/17 0730) Vital Signs (24h Range):  Temp:  [97.9 °F (36.6 °C)-98.4 °F (36.9 °C)] 97.9 °F (36.6 °C)  Pulse:  [] 100  Resp:  [23-34] 26  SpO2:  [81 %] 81 %  BP: ()/(0-53) 105/53  Arterial Line BP: (81-98)/(47-76) 90/69     Weight: 84.9 kg (187 lb 2.7 oz)  Body mass index is 28.46 kg/m².     SpO2: (!) 81 %  O2 Device (Oxygen Therapy): nasal cannula w/ humidification    Physical Exam   Constitutional: He is oriented to person, place, and time. He appears well-developed and well-nourished.   HENT:   Head: Normocephalic and atraumatic.   Eyes: Conjunctivae and EOM are normal. Pupils are equal, round, and reactive to light.   Neck: Normal range of motion. Neck supple. No JVD present.   Cardiovascular:   Smooth vad sounds     Pulmonary/Chest: Effort normal and breath sounds normal. No respiratory distress. He has no wheezes. He has no rales. He exhibits no tenderness.   Abdominal: Soft. Bowel sounds are normal. He exhibits no distension. There is no tenderness.   Musculoskeletal: Normal range of motion. He exhibits no edema or tenderness.   Neurological: He is alert and oriented to  person, place, and time.   Skin: Skin is warm and dry. No erythema. No pallor.       Significant Labs:   EP:   Recent Labs  Lab 07/31/17  0400 07/31/17  0554 07/31/17  1630 07/31/17 2221 08/01/17  0405   NA  --  136 135* 135* 135*  135*   K  --  4.0 4.0 4.1 3.9  3.9   CL  --  103 102 102 102  102   CO2  --  21* 23 22* 21*  21*   GLU  --  129* 126* 134* 137*  137*   BUN  --  31* 34* 35* 38*  38*   CREATININE  --  1.1 1.3 1.3 1.5*  1.5*   CALCIUM  --  8.4* 8.5* 8.4* 8.5*  8.5*   PROT  --  6.4  --   --   --    ALBUMIN  --  3.1*  --   --   --    BILITOT  --  2.2*  --   --   --    ALKPHOS  --  67  --   --   --    AST  --  43*  --   --   --    ALT  --  32  --   --   --    ANIONGAP  --  12 10 11 12  12   ESTGFRAFRICA  --  >60.0 >60.0 >60.0 54.9*  54.9*   EGFRNONAA  --  >60.0 56.5* 56.5* 47.5*  47.5*   WBC 13.90*  --   --   --  11.36   HGB 9.8*  --   --   --  9.4*   HCT 28.0*  --   --   --  26.5*   *  --   --   --  182   INR  --  1.1  --   --  1.1   , CMP:   Recent Labs  Lab 07/31/17  0554 07/31/17  1630 07/31/17 2221 08/01/17  0405    135* 135* 135*  135*   K 4.0 4.0 4.1 3.9  3.9    102 102 102  102   CO2 21* 23 22* 21*  21*   * 126* 134* 137*  137*   BUN 31* 34* 35* 38*  38*   CREATININE 1.1 1.3 1.3 1.5*  1.5*   CALCIUM 8.4* 8.5* 8.4* 8.5*  8.5*   PROT 6.4  --   --   --    ALBUMIN 3.1*  --   --   --    BILITOT 2.2*  --   --   --    ALKPHOS 67  --   --   --    AST 43*  --   --   --    ALT 32  --   --   --    ANIONGAP 12 10 11 12  12   ESTGFRAFRICA >60.0 >60.0 >60.0 54.9*  54.9*   EGFRNONAA >60.0 56.5* 56.5* 47.5*  47.5*   , CBC:   Recent Labs  Lab 07/31/17  0400 08/01/17  0405   WBC 13.90* 11.36   HGB 9.8* 9.4*   HCT 28.0* 26.5*   * 182    and INR:   Recent Labs  Lab 07/31/17  0554 08/01/17  0405   INR 1.1 1.1       Significant Imaging: Echocardiogram:   2D echo with color flow doppler:   Results for orders placed or performed during the hospital encounter of  07/18/17   2D echo with color flow doppler   Result Value Ref Range    Est. PA Systolic Pressure 20.43      Assessment and Plan:   Mr. Hayden is a 67 year old man with advanced NICM (LVEF 10%) on home , HTN, HLD with [employer] CRT-D who presented 7/18 morning with a syncopal episode during a 6MWT followed by an ICD shock x 2. On interrogation, he had been shocked 5 times since 7/14, and 4 times in last two days prior to admit. He is now s/p HMIII LVAD and RV lead on ICD is not pacing.  ICD is currently off.    AICD (automatic cardioverter/defibrillator) present    -bridging to coumadin, INR 1.1. Cannot perform while still on heparin due to bleeding risk so need to await therapeutic INR before any interventions.  Would recommend increasing coumadin dosing to reach therapeutic range  -Patient needs RV lead replacement vs revision given persistent dysfunction of RV lead  -ICD is off but patient has pads to chest wall in case it is needed  -has frequent PVCs at his baseline and this continues  -Has LVAD (HMIII) in place at this time        AICD discharge    Mr. Hayden is a 67 year old man with advanced NICM (LVEF 10%) awaiting LVAD, HTN HLD who presented with syncope and ICD discharge for VT. Underlying his VT, he also has an organized atrial tachycardia which appears to be new from his EGM since May 2017. He is acutely ill and would benefit from aggressive AAD therapy and treatment of his decompensated heart failure. Furthermore, with newly discovered atrial tachyarrhythmia, he would benefit from full anticoagulation in conjunction with AAD therapy. He has started amiodarone IV load this afternoon.    --continue full IV amiodarone load; plan on transition to PO amiodarone 400mg BID tomorrow x 2 weeks then likely 400mg daily thereafter  --initiate heparin gtt to prevent RUTH thrombus and CVA given atrial tachyarrhythmia and high CVA risk  --will discuss more definitive arrhythmia strategies once he is stabilized.         V-tach    Currently on amiodarone 400mg po BID   Continue Mg and K repletion maintaining K>4, Mg>2            Casey Newman MD  Cardiac Electrophysiology  [facility]

## 2017-08-01 NOTE — PROGRESS NOTES
Progress Note  Surgical Intensive Care    Admit Date: 7/18/2017  Post-operative Day: 4 Days Post-Op  Hospital Day: 15    SUBJECTIVE:     Follow-up For:  Procedure(s) (LRB):  CLOSURE-STERNAL WOUND (N/A)  PLACEMENT-NEOPERICARDIUM (N/A)   S/p sternotomy closure 7/18/17    Interval history: Pt. Reports nausea without emesis overnight. Otherwise no acute events. On epi and dobutamine, Nehemias at 5ppm on 3L NC. Currently on heparin, lasix , propofol, and amiodarone gtt. Cardene gtt has been on and off overnight, currently on at 5. Good UOP >100cc/hr.    Continuous Infusions:   sodium chloride 0.9% 10 mL/hr at 07/27/17 1451    amiodarone Stopped (07/31/17 1600)    dextrose 5 % and 0.9 % NaCl 3 mL/hr at 08/01/17 0600    DOBUTamine 4 mcg/kg/min (08/01/17 0600)    epinephrine infusion (NON-TITRATING) 0.06 mcg/kg/min (08/01/17 0600)    furosemide (LASIX) 1 mg/mL infusion (non-titrating) 20 mg/hr (08/01/17 0600)    heparin (porcine) in 5 % dex 1,100 Units/hr (08/01/17 0600)    nicardipine 5 mg/hr (08/01/17 0600)    nitric oxide gas       Scheduled Meds:   amiodarone  400 mg Oral BID    aspirin  325 mg Oral Daily    docusate sodium  200 mg Oral QHS    ferrous gluconate  324 mg Oral Daily with breakfast    magnesium oxide  400 mg Oral TID    mupirocin   Nasal BID    pantoprazole  40 mg Oral Daily    polyethylene glycol  17 g Oral BID    potassium chloride  20 mEq Oral BID    pravastatin  20 mg Oral QHS    sodium chloride 0.9%  3 mL Intravenous Q8H    warfarin  4 mg Oral Once     PRN Meds:albumin human 5%, albuterol sulfate, bisacodyl, fentaNYL, magnesium hydroxide 400 mg/5 ml, magnesium sulfate IVPB, ondansetron, oxycodone, oxycodone, potassium chloride, potassium chloride, sodium phosphate IVPB, sodium phosphate IVPB, sodium phosphate IVPB    Review of patient's allergies indicates:  No Known Allergies    OBJECTIVE:     Vital Signs (Most Recent)  Temp: 97.9 °F (36.6 °C) (08/01/17 0300)  Pulse: 101 (08/01/17  0600)  Resp: (!) 25 (08/01/17 0600)  BP: (!) 84/0 (07/31/17 1900)  SpO2: 98 % (07/31/17 0722)    Vital Signs Range (Last 24H):  Temp:  [97.9 °F (36.6 °C)-98.4 °F (36.9 °C)]   Pulse:  []   Resp:  [22-34]   BP: (84)/(0)   SpO2:  [98 %]   Arterial Line BP: (75-98)/(47-76)     I & O (Last 24H):    Intake/Output Summary (Last 24 hours) at 08/01/17 0701  Last data filed at 08/01/17 0600   Gross per 24 hour   Intake             2664 ml   Output             2030 ml   Net              634 ml     Ventilator Data (Last 24H):          Hemodynamic Parameters (Last 24H):  PAP: (36-57)/(17-29) 51/23  PAP (Mean):  [26 mmHg-39 mmHg] 33 mmHg  CO:  [5.1 L/min-6.5 L/min] 5.5 L/min  CI:  [2.6 L/min/m2-3.4 L/min/m2] 2.9 L/min/m2    Physical Exam   Constitutional: He appears well-developed and well-nourished. No distress. Resting comfortably   HENT:   Head: Normocephalic and atraumatic.   Eyes: Right eye exhibits no discharge. Left eye exhibits no discharge. No scleral icterus.   Neck: Normal range of motion. Neck supple.   Cardiovascular: Intact distal pulses.    LVAD hum present.   Pulmonary/Chest: Effort normal. No respiratory distress. NC 3L  R and L meds chest tubes in place- minimal serosanguinous drainage.  Bandage over sternotomy incision   Abdominal: Soft. He exhibits no distension.   Skin: Skin is warm and dry.     Laboratory (Last 24H):  CBC:    Recent Labs  Lab 08/01/17  0405   WBC 11.36   HGB 9.4*   HCT 26.5*        CMP:    Recent Labs  Lab 08/01/17  0405   CALCIUM 8.5*  8.5*   *  135*   K 3.9  3.9   CO2 21*  21*     102   BUN 38*  38*   CREATININE 1.5*  1.5*       ASSESSMENT/PLAN:     Neuro:   - AAOx3  -off sedation  -continue current pain mgmt     Pulmonary:   -on 3L NC- sating well  -Nehemias at 5ppm  -Chest tubes - R med and 2 L meds. Minimal output     Cardiac:  -MAP range goal >65  -Dobutamine gtt 4  -Epi 0.04  -currently on cardene drip at 5    Renal:   -Singer in place  -Bun/Cr stable  31/1.1  -UOP >100cc/hr  -lasix gtt 20     Fluids/Electrolytes/Nutrition/GI:   -Nutritional status: NPO  -NGT for meds  -replace lytes PRN     Hematology/Oncology:  -H/H not drawn today  -INR 1.2  -on heparin gtt  -Aspirin 325mg     Infectious Disease:   -Afebrile   -WBC 13.9  -cefepime  -Cultures NGTD    Endocrine:  -endocrine following: plan to start low dose novolog correction  -bg goal 140-180  -off insulin gtt     Dispo:  -Continue care in the ICU setting. Out of bed and in chair today.    Avery Crystal  CA2  P: [phone]

## 2017-08-01 NOTE — PLAN OF CARE
Problem: Patient Care Overview  Goal: Plan of Care Review  Outcome: Ongoing (interventions implemented as appropriate)  POC reviewed with the pt and his wife, Dr. Downing at bedside to address questions and concerns. Epi, Laxis, Dobut, and Heparin gtts titrated and infusing per order. VSS throughout shift, Cardene gtt weaned off and prn Hydralazine given. N/V x1, Zofran given. Pt ambulated in room with PT and sat in chair for few hrs. PICC placed and exchanged. Xray obtained. [place] d/c'd and capped. LVAD speed increased to 5200 per Dr. Downing. Refer to flowsheets for vitals, gtts, and assessments. Will continue to monitor.

## 2017-08-01 NOTE — PT/OT/SLP PROGRESS
Physical Therapy  Co-Treatment with OT    Suman Hayden   MRN: [id number]   Admitting Diagnosis: Acute on chronic combined systolic and diastolic heart failure    PT Received On: 08/01/17  PT Start Time: 1123     PT Stop Time: 1144    PT Total Time (min): 21 min       Billable Minutes:  Gait [id number] min    Treatment Type: Other (see comments) (co-treatment with OT)  PT/PTA: PT     PTA Visit Number: 0       General Precautions: Standard, fall, LVAD, sternal  Orthopedic Precautions: N/A   Braces:      Do you have any cultural, spiritual, Uatsdin conflicts, given your current situation?: none noted     Subjective:  Communicated with nurse prior to session.      Pain/Comfort  Pain Rating 1: 0/10  Pain Rating Post-Intervention 1: 0/10    Objective:   Patient found with: telemetry, arterial line, delgado catheter, PICC line, central line (LVAD, CVP, nitric oxide)    Functional Mobility:  Bed Mobility:   Rolling/Turning Right: Moderate assistance (pt needed verbal cues for functional mobility with sternal precautions. )  Sit to Supine: Moderate Assistance (pt leans backwards with sitting on EOB and needed verbal and tactile cues to correct.     Transfers:  Sit <> Stand Assistance: Total Assistance (min assist x 2)  Sit <> Stand Assistive Device: No Assistive Device    Gait:   Gait Distance: 7 ft x 2 reps. pt had sitting rest period between distance ambulated. pt stood at sink and performed ADLS with OT. PT assisted with static standing balance.   Assistance 1: Total assistance (min x 2)  Gait Assistive Device: Hand held assist  Gait Pattern: 2-point gait      AM-PAC 6 CLICK MOBILITY  How much help from another person does this patient currently need?   1 = Unable, Total/Dependent Assistance  2 = A lot, Maximum/Moderate Assistance  3 = A little, Minimum/Contact Guard/Supervision  4 = None, Modified Clarence/Independent    Turning over in bed (including adjusting bedclothes, sheets and blankets)?: 2  Sitting down on and  standing up from a chair with arms (e.g., wheelchair, bedside commode, etc.): 2  Moving from lying on back to sitting on the side of the bed?: 2  Moving to and from a bed to a chair (including a wheelchair)?: 2  Need to walk in hospital room?: 2  Climbing 3-5 steps with a railing?: 1  Total Score: 11    AM-PAC Raw Score CMS G-Code Modifier Level of Impairment Assistance   6 % Total / Unable   7 - 9 CM 80 - 100% Maximal Assist   10 - 14 CL 60 - 80% Moderate Assist   15 - 19 CK 40 - 60% Moderate Assist   20 - 22 CJ 20 - 40% Minimal Assist   23 CI 1-20% SBA / CGA   24 CH 0% Independent/ Mod I     Patient left up in chair with all lines intact, call button in reach and wife. present.    Assessment:  Suman Hayden is a 67 y.o. male with a medical diagnosis of Acute on chronic combined systolic and diastolic heart failure and presents with decreased mobility, transfers and decreased distance ambulated. Pt would benefit from cont PT to address deficits and will be able to discharge home with HHPT when medically stable. Pt will benefit from skilled PT 6x/wk to progress to Independent status. Pt is s/p  LVAD placement 7/27/17 and chest closure 7/28/17.    Rehab identified problem list/impairments: Rehab identified problem list/impairments: impaired endurance, impaired functional mobilty, gait instability, impaired balance, decreased lower extremity function    Rehab potential is good.    Activity tolerance: Good    Discharge recommendations: Discharge Facility/Level Of Care Needs: home health PT     Barriers to discharge: Barriers to Discharge: None    Equipment recommendations: Equipment Needed After Discharge:  (will determine DME needs closer to discharge. )     GOALS:    Physical Therapy Goals        Problem: Physical Therapy Goal    Goal Priority Disciplines Outcome Goal Variances Interventions   Physical Therapy Goal     PT/OT, PT Ongoing (interventions implemented as appropriate)     Description:  Goals to be  met by: 17     Patient will increase functional independence with mobility by performin. Supine to sit with MInimal Assistance- not met  2. Sit to supine with MInimal Assistance - not met  3. Sit to stand transfer with Minimal Assistance- not met  4. Bed to chair transfer with Minimal Assistance- not met  5. Gait  x 50 feet with Minimal Assistance. - not met  6. Lower extremity exercise program x15 reps, with supervision, in order to increase LE strength and (I) with functional mobility. - not met                       PLAN:    Patient to be seen 6 x/week  to address the above listed problems via therapeutic activities, therapeutic exercises, gait training  Plan of Care expires: 17  Plan of Care reviewed with: patient, spouse         Astrid Whaley, PT  2017

## 2017-08-01 NOTE — PROGRESS NOTES
Dr. Downing informed of patient pertinent labs. Notified of current gtts, rates, I and O's, CVP last flat =14. No electrolyte replacement required at this time per Dr. Downing.

## 2017-08-01 NOTE — PROGRESS NOTES
Dr. Clay at bedside. CVP flat = 16. Orders received to increase epi to 0.06mcg/kg/min. Decrease lasix gtt to 20mg/hr. Increase nitric oxide to 5 ppm via nasal cannula. Irrigate delgado catheter. She will place orders to administer diuril.

## 2017-08-01 NOTE — SUBJECTIVE & OBJECTIVE
Interval History: patient less nauseated today, in bed     Continuous Infusions:   sodium chloride 0.9% 10 mL/hr at 07/27/17 1451    amiodarone Stopped (07/31/17 1600)    dextrose 5 % and 0.9 % NaCl 3 mL/hr at 08/01/17 0700    DOBUTamine 5 mcg/kg/min (08/01/17 1200)    epinephrine infusion (NON-TITRATING) 0.04 mcg/kg/min (08/01/17 1200)    furosemide (LASIX) 1 mg/mL infusion (non-titrating) 20 mg/hr (08/01/17 1200)    heparin (porcine) in 5 % dex 1,100 Units/hr (08/01/17 1200)    nicardipine 1 mg/hr (08/01/17 1200)    nitric oxide gas       Scheduled Meds:   amiodarone  400 mg Oral BID    amlodipine  5 mg Oral Daily    aspirin  325 mg Oral Daily    docusate sodium  200 mg Oral QHS    ferrous gluconate  324 mg Oral Daily with breakfast    magnesium oxide  400 mg Oral TID    mupirocin   Nasal BID    pantoprazole  40 mg Oral Daily    polyethylene glycol  17 g Oral BID    potassium chloride  20 mEq Oral BID    pravastatin  20 mg Oral QHS    sodium chloride 0.9%  10 mL Intravenous Q6H    sodium chloride 0.9%  3 mL Intravenous Q8H    warfarin  4 mg Oral Once     PRN Meds:albumin human 5%, albuterol sulfate, bisacodyl, fentaNYL, magnesium hydroxide 400 mg/5 ml, magnesium sulfate IVPB, ondansetron, oxycodone, oxycodone, potassium chloride, potassium chloride, Flushing PICC Protocol **AND** sodium chloride 0.9% **AND** sodium chloride 0.9%, sodium phosphate IVPB, sodium phosphate IVPB, sodium phosphate IVPB    Review of patient's allergies indicates:  No Known Allergies  Objective:     Vital Signs (Most Recent):  Temp: 97.9 °F (36.6 °C) (08/01/17 0700)  Pulse: 99 (08/01/17 1215)  Resp: (!) 27 (08/01/17 1215)  BP: (!) 105/53 (08/01/17 0730)  SpO2: (!) 81 % (08/01/17 0730) Vital Signs (24h Range):  Temp:  [97.9 °F (36.6 °C)-98.4 °F (36.9 °C)] 97.9 °F (36.6 °C)  Pulse:  [] 99  Resp:  [20-34] 27  SpO2:  [81 %] 81 %  BP: ()/(0-53) 105/53  Arterial Line BP: (73-98)/(47-76) 77/60     Weight: 84.9  kg (187 lb 2.7 oz)  Body mass index is 28.46 kg/m².      Intake/Output Summary (Last 24 hours) at 08/01/17 1302  Last data filed at 08/01/17 1200   Gross per 24 hour   Intake             2664 ml   Output             2190 ml   Net              474 ml       Hemodynamic Parameters:  PAP: (36-57)/(17-29) 50/23  PAP (Mean):  [26 mmHg-39 mmHg] 33 mmHg  CO:  [5.1 L/min-6.5 L/min] 6.5 L/min  CI:  [2.6 L/min/m2-3.4 L/min/m2] 3.3 L/min/m2        Physical Exam   Constitutional: He appears well-developed and well-nourished. He is sedated.   HENT:   Head: Normocephalic and atraumatic.   Eyes: EOM are normal. Pupils are equal, round, and reactive to light.   Neck: Neck supple. No tracheal deviation present.   Cardiovascular:   VAD hum   Pulmonary/Chest: Effort normal.   VAD hum   Abdominal: Soft. Bowel sounds are decreased.   Skin: Skin is warm and dry.   Midsternum incision open, dressing occlusive and intact    Psychiatric:            Significant Labs:  CBC:    Recent Labs  Lab 08/01/17  0405   WBC 11.36   RBC 3.29*   HGB 9.4*   HCT 26.5*      MCV 81*   MCH 28.6   MCHC 35.5     BNP:    Recent Labs  Lab 07/31/17  0400   BNP 1,389*     CMP:    Recent Labs  Lab 07/31/17  0554  08/01/17  1152   *  < > 144*   CALCIUM 8.4*  < > 8.6*   ALBUMIN 3.1*  --   --    PROT 6.4  --   --      < > 135*   K 4.0  < > 3.8   CO2 21*  < > 21*     < > 101   BUN 31*  < > 39*   CREATININE 1.1  < > 1.6*   ALKPHOS 67  --   --    ALT 32  --   --    AST 43*  --   --    BILITOT 2.2*  --   --    < > = values in this interval not displayed.   Coagulation:     Recent Labs  Lab 08/01/17  0405 08/01/17  1152   INR 1.1  --    APTT 44.1*  44.1* 43.1*     LDH:    Recent Labs  Lab 07/30/17  0400 07/31/17  0400 08/01/17  0405   * 380* 345*     Microbiology:  Microbiology Results (last 7 days)     Procedure Component Value Units Date/Time    Blood culture [[id number]] Collected:  07/24/17 1300    Order Status:  Completed Specimen:   Blood from Peripheral, Hand, Right Updated:  07/29/17 1812     Blood Culture, Routine No growth after 5 days.    Urine culture [[id number]]  (Susceptibility) Collected:  07/24/17 1139    Order Status:  Completed Specimen:  Urine from Urine, Clean Catch Updated:  07/29/17 1506     Urine Culture, Routine --     ENTEROCOCCUS FAECALIS  >100,000 cfu/ml  No other significant isolate      Blood culture [[id number]] Collected:  07/24/17 1100    Order Status:  Completed Specimen:  Blood from Peripheral, Antecubital, Left Updated:  07/29/17 1412     Blood Culture, Routine No growth after 5 days.          I have reviewed all pertinent labs within the past 24 hours.    Estimated Creatinine Clearance: 47.5 mL/min (based on Cr of 1.6).    Diagnostic Results:  I have reviewed and interpreted all pertinent imaging results/findings within the past 24 hours.

## 2017-08-01 NOTE — ASSESSMENT & PLAN NOTE
-bridging to coumadin, INR 1.1. Cannot perform while still on heparin due to bleeding risk so need to await therapeutic INR before any interventions.  Would recommend increasing coumadin dosing to reach therapeutic range  -Patient needs RV lead replacement vs revision given persistent dysfunction of RV lead  -ICD is off but patient has pads to chest wall in case it is needed  -has frequent PVCs at his baseline and this continues  -Has LVAD (HMIII) in place at this time

## 2017-08-01 NOTE — PLAN OF CARE
Problem: Physical Therapy Goal  Goal: Physical Therapy Goal  Goals to be met by: 17     Patient will increase functional independence with mobility by performin. Supine to sit with MInimal Assistance- not met  2. Sit to supine with MInimal Assistance - not met  3. Sit to stand transfer with Minimal Assistance- not met  4. Bed to chair transfer with Minimal Assistance- not met  5. Gait  x 50 feet with Minimal Assistance. - not met  6. Lower extremity exercise program x15 reps, with supervision, in order to increase LE strength and (I) with functional mobility. - not met     Goals remain appropriate. Astrid Whaley, PT 2017

## 2017-08-01 NOTE — PROCEDURES
"Suman Hayden is a 67 y.o. male patient.    Temp: 97.9 °F (36.6 °C) (08/01/17 0700)  Pulse: 100 (08/01/17 0930)  Resp: (!) 26 (08/01/17 0930)  BP: (!) 105/53 (08/01/17 0730)  SpO2: (!) 81 % (08/01/17 0730)  Weight: 84.9 kg (187 lb 2.7 oz) (08/01/17 0500)  Height: 5' 8" (172.7 cm) (07/28/17 0700)    PICC  Date/Time: 8/1/2017 10:53 AM  Performed by: CHEYANNE SOARES  Consent Done: Yes  Time out: Immediately prior to procedure a time out was called to verify the correct patient, procedure, equipment, support staff and site/side marked as required  Indications: med administration and vascular access  Anesthesia: local infiltration  Local anesthetic: lidocaine 1% without epinephrine  Anesthetic Total (mL): 3  Preparation: skin prepped with ChloraPrep  Skin prep agent dried: skin prep agent completely dried prior to procedure  Sterile barriers: all five maximum sterile barriers used - cap, mask, sterile gown, sterile gloves, and large sterile sheet  Hand hygiene: hand hygiene performed prior to central venous catheter insertion  Location details: right brachial  Catheter type: double lumen  Catheter size: 5 Fr  Catheter Length: 36cm    Ultrasound guidance: yes  Vessel Caliber: medium and patent, compressibility normal  Vascular Doppler: not done  Needle advanced into vessel with real time Ultrasound guidance.  Guidewire confirmed in vessel.  Image recorded and saved.  Sterile sheath used.  no esophageal manometryNumber of attempts: 1  Post-procedure: blood return through all ports, chlorhexidine patch and sterile dressing applied  Specimens: No  Implants: No  Assessment: placement verified by x-ray  Complications: none        Geovanna Curtis  8/1/2017  "

## 2017-08-01 NOTE — PROGRESS NOTES
Daily E and M and VAD Interrogation Note    Reason for Visit:  Patient is seen in follow up for management of:  [] HeartMate II  [] Heartware [] Total artificial heart       [] ECMO           [x] Other - HM III     Interval History:  [] Interval history unobtainable due to intubation.  The [x] implant/[] explant date was 7/27/17    Events - No acute events o/n. Urine output declined - lasix gtt increased to 20 and pt received dose of diuril. Epi increased to 0.06, dobutamine remains at 5. Clinically, pt looks good this AM. Tolerating diet; although c/o some nausea overnight.      Review of Systems:   Negative except as above.      Medications:  Current Facility-Administered Medications   Medication Dose Route Frequency Provider Last Rate Last Dose    0.9%  NaCl infusion   Intravenous Continuous Shayne Espinoza MD 10 mL/hr at 07/27/17 1451      albumin human 5% bottle 500 mL  500 mL Intravenous PRN Shayne Espinoza MD   500 mL at 07/28/17 1755    albuterol nebulizer solution 2.5 mg  2.5 mg Nebulization Q4H PRN Shayne Espinoza MD        amiodarone 360 mg/200 mL (1.8 mg/mL) infusion  0.5 mg/min Intravenous Continuous Shayne Espinoza MD   Stopped at 07/31/17 1600    amiodarone tablet 400 mg  400 mg Oral BID Shayne Espinoza MD   400 mg at 08/01/17 0903    amlodipine tablet 5 mg  5 mg Oral Daily Shayne Espinoza MD   5 mg at 08/01/17 0903    aspirin EC tablet 325 mg  325 mg Oral Daily Shayne Espinoza MD   325 mg at 08/01/17 0903    bisacodyl suppository 10 mg  10 mg Rectal Daily PRN Shayne Espinoza MD   10 mg at 07/31/17 1733    dextrose 5 % and 0.9 % NaCl infusion   Intravenous Continuous Shayne Espinoza MD 3 mL/hr at 08/01/17 0700      DOBUtamine 1000 mg in D5W 250 mL infusion (premix non-titrating)  5 mcg/kg/min (Dosing Weight) Intravenous Continuous Shayne Espinoza MD 6 mL/hr at 08/01/17 1500 5 mcg/kg/min at 08/01/17 1500    docusate sodium capsule 200 mg  200 mg Oral  HS Shayne Espinoza MD   200 mg at 07/31/17 2036    EPINEPHrine (ADRENALIN) 4 mg in sodium chloride 0.9% 250 mL infusion  0.04 mcg/kg/min (Dosing Weight) Intravenous Continuous Shayne Espinoza MD 12 mL/hr at 08/01/17 1500 0.04 mcg/kg/min at 08/01/17 1500    fentaNYL injection 50 mcg  50 mcg Intravenous Q4H PRN Shayne Espinoza MD   50 mcg at 07/29/17 0827    ferrous gluconate tablet 324 mg  324 mg Oral Daily with breakfast Shayne Espinoza MD   324 mg at 08/01/17 0830    furosemide (LASIX) 250 mg in sodium chloride 0.9 % 250 mL infusion (non-titrating)  20 mg/hr Intravenous Continuous Edwige Clay MD 20 mL/hr at 08/01/17 1500 20 mg/hr at 08/01/17 1500    heparin 25,000 units in dextrose 5% 250 mL (100 units/mL) infusion (heparin infusion)  1,100 Units/hr Intravenous Continuous Shayne Espinoza MD 11 mL/hr at 08/01/17 1500 1,100 Units/hr at 08/01/17 1500    magnesium hydroxide 400 mg/5 ml suspension 2,400 mg  30 mL Oral Daily PRN Shayne Espinoza MD        magnesium oxide tablet 400 mg  400 mg Oral TID Shayne Espinoza MD   400 mg at 08/01/17 0614    magnesium sulfate 2g in water 50mL IVPB (premix)  2 g Intravenous PRN Shayne Espinoza MD   2 g at 07/30/17 0731    mupirocin 2 % ointment   Nasal BID Shayne Espinoza MD        niCARdipine 40 mg/200 mL infusion  1 mg/hr Intravenous Continuous Shayne Espinoza MD   Stopped at 08/01/17 1300    nitric oxide gas Gas 5 ppm  5 ppm Inhalation Continuous Edwige Clay MD   5 ppm at 07/31/17 2330    ondansetron injection 4 mg  4 mg Intravenous Q8H PRN Shayne Espinoza MD   4 mg at 07/31/17 2211    oxycodone immediate release tablet 10 mg  10 mg Oral Q4H PRN Shayne Espinoza MD        oxycodone immediate release tablet 5 mg  5 mg Oral Q4H PRN Shayne Espinoza MD   5 mg at 07/30/17 0817    pantoprazole EC tablet 40 mg  40 mg Oral Daily Shayne Espinoza MD   40 mg at 08/01/17 0903    polyethylene glycol packet  17 g  17 g Oral BID Shayne Espinoza MD   17 g at 08/01/17 0903    potassium chloride 20 mEq in 100 mL IVPB (FOR CENTRAL LINE ADMINISTRATION ONLY)  40 mEq Intravenous PRN Shayne Espinoza MD 50 mL/hr at 07/30/17 1336 40 mEq at 07/30/17 1336    potassium chloride 20 mEq in 100 mL IVPB (FOR CENTRAL LINE ADMINISTRATION ONLY)  40 mEq Intravenous PRN Shayne Espinoza MD        potassium chloride CR capsule 20 mEq  20 mEq Oral BID Shayne Espinoza MD   20 mEq at 08/01/17 0903    pravastatin tablet 20 mg  20 mg Oral QHS Lorena Payton MD   20 mg at 07/31/17 2036    sodium chloride 0.9% flush 10 mL  10 mL Intravenous Q6H Sunny Downing MD        And    sodium chloride 0.9% flush 10 mL  10 mL Intravenous PRN Sunny Downing MD        sodium chloride 0.9% flush 3 mL  3 mL Intravenous Q8H Shayne Espinoza MD   3 mL at 08/01/17 0600    sodium phosphate 15 mmol in dextrose 5 % 250 mL IVPB  15 mmol Intravenous PRN Edwige Clay MD 83.3 mL/hr at 07/29/17 2240 15 mmol at 07/29/17 2240    sodium phosphate 20.01 mmol in dextrose 5 % 250 mL IVPB  20.01 mmol Intravenous PRN Edwige Clay MD        sodium phosphate 30 mmol in dextrose 5 % 250 mL IVPB  30 mmol Intravenous PRN Edwige Clay MD        warfarin (COUMADIN) tablet 4 mg  4 mg Oral Once Sharlene Tran MD           Physical Examination:  Vital Signs:   Vitals:    08/01/17 1500   BP: (!) 90/0   Pulse: 103   Resp: (!) 30   Temp:      Cardiovascular:  [x] Regular rate and rhythm [] Irregular []  None (MATT) []  Other  []  No edema [x]  Edema present  [x]  Clear to auscultation  []  Rales to []  Coarse  []  No rales but   [] Pedal Pulses absent  [x]  Pulses  + throughout  Skin:  Incision is [x]  Clean, dry and intact.  []  Other   Sternum:  [x]  Stable []  Unstable  Driveline(s):   [x]  Clean, dry and intact. []  Other       Labs:  BMP  Lab Results   Component Value Date     (L) 08/01/2017    K 3.8 08/01/2017      08/01/2017    CO2 21 (L) 08/01/2017    BUN 39 (H) 08/01/2017    CREATININE 1.6 (H) 08/01/2017    CALCIUM 8.6 (L) 08/01/2017    ANIONGAP 13 08/01/2017    ESTGFRAFRICA 50.8 (A) 08/01/2017    EGFRNONAA 43.9 (A) 08/01/2017       Magnesium   Date Value Ref Range Status   08/01/2017 2.5 1.6 - 2.6 mg/dL Final       Lab Results   Component Value Date    WBC 11.36 08/01/2017    HGB 9.4 (L) 08/01/2017    HCT 26.5 (L) 08/01/2017    MCV 81 (L) 08/01/2017     08/01/2017       Lab Results   Component Value Date    INR 1.1 08/01/2017    INR 1.1 07/31/2017    INR 1.2 07/30/2017       BNP   Date Value Ref Range Status   07/31/2017 1,389 (H) 0 - 99 pg/mL Final     Comment:     Values of less than 100 pg/ml are consistent with non-CHF populations.   07/28/2017 1,184 (H) 0 - 99 pg/mL Final     Comment:     Values of less than 100 pg/ml are consistent with non-CHF populations.   07/24/2017 809 (H) 0 - 99 pg/mL Final     Comment:     Values of less than 100 pg/ml are consistent with non-CHF populations.       LD   Date Value Ref Range Status   08/01/2017 345 (H) 110 - 260 U/L Final     Comment:     Results are increased in hemolyzed samples.   07/31/2017 380 (H) 110 - 260 U/L Final     Comment:     Results are increased in hemolyzed samples.   07/30/2017 507 (H) 110 - 260 U/L Final     Comment:     Results are increased in hemolyzed samples.       X-Rays:  [x]  I reviewed today's Chest x-ray    Procedure:  Device Interrogation including analysis of device parameters.  Current Settings   [x]  Ventricular Assist Device  []  Total Artificial Heart interrogated  Review of device function is [x]  Stable []  Other   TXP LVAD INTERROGATIONS 8/1/2017 8/1/2017 8/1/2017 8/1/2017 8/1/2017 8/1/2017 8/1/2017   Type HeartMate3 HeartMate3 HeartMate3 HeartMate3 HeartMate3 HeartMate3 HeartMate3   Flow 3.9 4 4.2 4.2 4.1 4.2 4.1   Speed 5100 5100 5100 5100 5100 5100 5100   PI 4.3 3.7 3.4 3.3 3.3 3.1 3.3   Power ([place]) 3.5 3.5 3.4 3.4 3.4 3.4 3.4    LSL - - - - - - -   Pulsatility - - - - - - -       Assessment:  [x]  Primary Cardiomyopathy []  Congestive Heart Failure   []  Atrial Fibrillation []  Ventricular Tachycardia   []  Aftercare cardiac device []  Long term (current) use of anticoagulants   []  Ventilator-associated pneumonia []  Pneumonia viral, unspecified   []  Pneumonia, bacterial, unspecified []  Pneumonia, organism unspecified   []  Hemorrhage of GI tract, unspecified    []  Nosebleed []  Driveline infection   []  Infection VAD device []  New onset of    []        Plan:  [x]  Interval history obtained from ICU attending team member during rounding today  []  VAD/MATT teaching performed with patient  []  Mobilization / Physical Therapy ongoing  []  Anticoagulation []  Ongoing []  Held  []  Studies ordered  [x]   To OR today for closure.       Total time spent was 30 minutes.  Of which more than 50 percent of the care dominated counseling and coordinating care with different team members. The VAD was interrogated and all parameters were WNL and no significant findings were found in the history. All these findings are documented in the note above.    Neuro:  - Continue current pain control regimen    Resp:  - Extubated   - Minimize supplemental O2 requirements  - Pulm toilet  - Nehemias 5 ppm in 2L NC.     CV:  - HDS; LVAD numbers stable o/n  -  from 380  - Amio 400 mg BID.   - Epi to 0.04  - Dobutamine to 5  -     Heme:  - Hgb/Hct stable  - Continue to trend  - Coumadin tonight - 4 mg  - Heparin gtt     ID:  - Tmax 99  - WBC down to 11.4  - continue to monitor       Renal:  - Singer in place  - Strict I/Os   - Adequate UOP since increasing lasix and dose of diuril - 100-165 cc/hr o/n.  - Lasix gtt at 20 mg/hr  - Diuril 500 mg once this AM    FEN/GI:  - Dental soft diet  - Miralax BID  - Replace lytes PRN    Endo:  - Endocrine following, appreciate assistance  - Accuchecks  - Insulin gtt    Dispo:  - Continue ICU care      Shayne Espinoza  MD  General Surgery PGY-2  Pager: [phone]      Cardiac Surgery Attending E and M (VAD) Note along with VAD Interrogation    I have seen and examined the patient and agree with the findings above    I also reviewed the patients clinical course and:  [x]  Hemodynamic & Respiratory parameters  [x]  Laboratory Data  [x]  Radiological studies     VAD Interrogated [x]      VAD Function is normal. Changes made []  none [x]      Interrogation of Ventricular assist device was performed with physician analysis of device parameters and review of device function. I have personally reviewed the interrogation findings and agree with findings as stated.

## 2017-08-01 NOTE — PLAN OF CARE
Problem: Patient Care Overview  Goal: Individualization & Mutuality  Dx: LVAD    PMHx: CHF (EF=10%), IABP, HLD, HTN, Obesity, S/P implantation of automatic cardioverter/def    PSHx:CARDIAC CATHETERIZATION, CARDIAC DEFIBRILLATOR PLACEMENT    7/27: LVAD; 1.5L albumin bolus, 1 u PRBC  7/28: to OR for chest closure, 1 u PRBC, 1L albumin, GLENN @ bedside  7/29: extubated       Nursing:  *MAP 60-80  *q 6h ptt and BMP, Mg  *ptt goal 40-50  Day bath with LVAD dressing change     *Keep de-fib pads at bedside at all times, AICD does not work*         Outcome: Ongoing (interventions implemented as appropriate)  Patient AAO x4. Remains on epi, cardene, dobutamine, heparin, and lasix gtts. CVP last flat =14. Administered diuril IV for marginal urinary output. Responded well. Urinary output now approx 100cc / hr. 2L nasal cannula with 5ppm of NO. SVO2 calibrated = 67%. LVAD speed 5100, flows =4. Chest tube output minimal. Intermittent nausea with one episode of emesis, clear, green, small amount. Patient passing flatus. Labs trended. Patient and wife at bedside updated on plan of care. See chart and doc flow sheets for more details.

## 2017-08-01 NOTE — PT/OT/SLP PROGRESS
"Speech Language Pathology  Treatment    Suman Hayden   MRN: [id number]   [id number]     Admitting Diagnosis: Acute on chronic combined systolic and diastolic heart failure    Diet recommendations:   Solid Diet Level: Dental Soft    Liquid Diet Level: Thin    HOB to 90 degrees and Meds whole 1 at a time    SLP Treatment Date: 08/01/17  Speech Start Time: 1304     Speech Stop Time: 1327     Speech Total (min): 23 min       TREATMENT BILLABLE MINUTES:  Treatment Swallowing Dysfunction 23    Has the patient been evaluated by SLP for swallowing? : Yes  Keep patient NPO?: No   General Precautions: Standard, LVAD, fall, sternal  Current Respiratory Status: nasal cannula       Subjective:  "It was so dry." Pt with multiple complaints regarding the food on his food tray  Pt's wife present and endorsed pt with particular tastes in food.     Pain/Comfort  Pain Rating 1: 0/10    Objective:   Patient found with: blood pressure cuff, telemetry, oxygen, pulse ox (continuous) (lvad)  Pt seen for ongoing assessment of diet tolerance. Pt and pt's wife report pt does not usually eat with dentures at home and does not have difficulty with solids. Pt reported difficulty recently with the dryness of his food and endorsed as sensation in his chest of food getting "stuck" in his chest.  He denied globus sensation in throat. He refused trials of solids with clinician this day. He was assessed with apple sauce and large cyclical straw sips of water. Pt denied globus sensation with all trials. No overt s/s of aspiration noted. Pt's wife reported she was planning to order softer, moist items for dinner.  Will continue to follow up to ensure diet tolerance if pt accepts trials. RN denied pt with difficulty swallowing po medication whole.     Assessment:  Suman Hayden is a 67 y.o. male with a medical diagnosis of Acute on chronic combined systolic and diastolic heart failure and presents with no overt s/s of aspiration  .    Discharge " recommendations: Discharge Facility/Level Of Care Needs: other (see comments) (no skilled SLP intervention warranted upon d/c)     Goals:    SLP Goals        Problem: SLP Goal    Goal Priority Disciplines Outcome   SLP Goal     SLP Ongoing (interventions implemented as appropriate)   Description:  Speech Language Pathology Goals  Goals expected to be met by 8/4:  1. Pt will tolerate a dental soft diet and thin liquids without s/s of aspiration.                          Plan:   Patient to be seen Therapy Frequency: 5 x/week   Plan of Care expires: 08/27/17  Plan of Care reviewed with: patient, spouse  SLP Follow-up?: Yes       SHERRI Hart, CCC-SLP, CLC  Speech Language Pathologist  Certified Lactation Counselor  [phone]  8/1/2017

## 2017-08-01 NOTE — PLAN OF CARE
Problem: Occupational Therapy Goal  Goal: Occupational Therapy Goal  Goals to be met by:  2 weeks 8/12/17     Patient will increase functional independence with ADLs by performing:  Feeding: Independent   UE Dressing with Supervision.  LE Dressing with Supervision.  Grooming while standing with Supervision.  Toileting from toilet with Supervision for hygiene and clothing management.   Stand pivot transfers with Supervision.  Toilet transfer to toilet with Supervision.  Pt will be independent with LVAD yasmin't.      Outcome: Ongoing (interventions implemented as appropriate)  Continue OT plan of care. Pt making good progress with OT goals and outcomes

## 2017-08-01 NOTE — PROGRESS NOTES
Update Note:    SW to pt's room for update. Pt aaox4, calm, and pleasant. Pt's wife aaox4, communicative, and pleasant. Pt's wife reading LVAD educational material as SW entered room. Pt and wife report coping adequately at this time. Pt and wife report no needs from SW and none identified. SW providing ongoing psychosocial counseling and support, education, assistance, resources, and discharge planning as indicated. SW continuing to follow and remains available.

## 2017-08-01 NOTE — PLAN OF CARE
Problem: SLP Goal  Goal: SLP Goal  Speech Language Pathology Goals  Goals expected to be met by 8/4:  1. Pt will tolerate a dental soft diet and thin liquids without s/s of aspiration.        Outcome: Ongoing (interventions implemented as appropriate)  Continue current plan of care. Goals remain appropriate. SHERRI Lofton, CCC-SLP, [facility] 8/1/2017

## 2017-08-01 NOTE — PROGRESS NOTES
"[facility]  Adult Nutrition  Progress Note    SUMMARY     Recommendations  Recommendation/Intervention:   1. Encourage increased PO intake.   2. Recommend adding Boost Plus OS to all meals to increased calorie and protein intake.   3. Recommend adding 2 packets Beneprotein to each tray to meet increased protein needs.  RD to monitor.    Goals: Meet % EEN, EPN  Nutrition Goal Status: progressing towards goal  Communication of RD Recs: reviewed with RN    Reason for Assessment  Reason for Assessment: RD follow-up  Diagnosis:  (s/p LVAD)  Relevent Medical History: HTN, HLD   Interdisciplinary Rounds: attended  General Information Comments: Patient extubated. On Dental Soft diet. Patient very drowsy at time of RD visit, RN reports patient eating somewhat.  Nutrition Discharge Planning: Adequate nutrition    Nutrition Prescription Ordered  Current Diet Order: Dental Soft  Nutrition Order Comments: 1500 mL FR    Evaluation of Received Nutrients/Fluid Intake  IV Fluid (mL): 240  % Intake of Estimated Energy Needs: 25 - 50 %  % Meal Intake: 50%     Nutrition Risk Screen   Nutrition Risk Screen: no indicators present    Nutrition/Diet History  Patient Reported Diet/Restrictions/Preferences: general, low salt  Factors Affecting Nutritional Intake: decreased appetite    Labs/Tests/Procedures/Meds   Pertinent Labs Reviewed: reviewed, pertinent  Pertinent Labs Comments: Na 135, BUN 38, Creat 1.5, Glu 137, Ca 8.5  Pertinent Medications Reviewed: reviewed, pertinent  Pertinent Medications Comments: ferrous sulfate, pantoprazole, coumadin, IVF, amiodarone, dobutamine drip, adrenalin, furosemide, cardene    Physical Findings  Overall Physical Appearance: nourished  Tubes:  (none)  Oral/Mouth Cavity: tooth/teeth missing  Skin: edema, incision    Anthropometrics  Height: 5' 8" (172.7 cm)  Weight Method: Bed Scale  Weight: 84.9 kg (187 lb 2.7 oz)  Ideal Body Weight (IBW), Male: 154 lb  % Ideal Body Weight, " Male (lb): 114.1 lb  BMI (Calculated): 26.8  BMI Grade: 25 - 29.9 - overweight    Estimated/Assessed Needs  Weight Used For Calorie Calculations: 79.7 kg (175 lb 11.3 oz)   Energy Need Method: Chase-St Jeor, other (see comments) (1933 kcal/d)  RMR (Chase-St. Jeor Equation): 1546.5  Weight Used For Protein Calculations: 79.7 kg (175 lb 11.3 oz)  Protein Requirements: 104-128 g/d  Fluid Need Method: RDA Method, other (see comments) (Per MD or 1 mL/kcal)    Assessment and Plan  Increased nutrient needs related to physiological causes as evidenced by s/p LVAD placement.  Status: Continues    Monitor and Evaluation    Food and Nutrient Intake: energy intake, food and beverage intake, enteral nutrition intake  Food and Nutrient Adminstration: diet order, enteral and parenteral nutrition administration     Physical Activity and Function: nutrition-related ADLs and IADLs  Anthropometric Measurements: weight, weight change, body mass index  Biochemical Data, Medical Tests and Procedures: gastrointestinal profile, electrolyte and renal panel, glucose/endocrine profile, lipid profile, inflammatory profile  Nutrition-Focused Physical Findings: overall appearance    Nutrition Risk    Level of Risk: other (see comments) (2x/week)    Nutrition Follow-Up    RD Follow-up?: Yes

## 2017-08-02 LAB
ALBUMIN SERPL BCP-MCNC: 3 G/DL
ALP SERPL-CCNC: 56 U/L
ALT SERPL W/O P-5'-P-CCNC: 22 U/L
ANION GAP SERPL CALC-SCNC: 12 MMOL/L
ANION GAP SERPL CALC-SCNC: 13 MMOL/L
ANISOCYTOSIS BLD QL SMEAR: SLIGHT
APTT BLDCRRT: 24.9 SEC
APTT BLDCRRT: 31.7 SEC
APTT BLDCRRT: 39 SEC
APTT BLDCRRT: 45.3 SEC
AST SERPL-CCNC: 24 U/L
BASOPHILS # BLD AUTO: ABNORMAL K/UL
BASOPHILS NFR BLD: 0 %
BILIRUB DIRECT SERPL-MCNC: 1.6 MG/DL
BILIRUB SERPL-MCNC: 2.3 MG/DL
BNP SERPL-MCNC: 1398 PG/ML
BUN SERPL-MCNC: 41 MG/DL
CALCIUM SERPL-MCNC: 8.7 MG/DL
CALCIUM SERPL-MCNC: 8.8 MG/DL
CALCIUM SERPL-MCNC: 8.9 MG/DL
CALCIUM SERPL-MCNC: 9.1 MG/DL
CHLORIDE SERPL-SCNC: 100 MMOL/L
CHLORIDE SERPL-SCNC: 99 MMOL/L
CO2 SERPL-SCNC: 21 MMOL/L
CO2 SERPL-SCNC: 22 MMOL/L
CO2 SERPL-SCNC: 22 MMOL/L
CO2 SERPL-SCNC: 24 MMOL/L
CREAT SERPL-MCNC: 1.8 MG/DL
CREAT SERPL-MCNC: 1.9 MG/DL
CRP SERPL-MCNC: 118.7 MG/L
DIFFERENTIAL METHOD: ABNORMAL
EOSINOPHIL # BLD AUTO: ABNORMAL K/UL
EOSINOPHIL NFR BLD: 1 %
ERYTHROCYTE [DISTWIDTH] IN BLOOD BY AUTOMATED COUNT: 15.3 %
EST. GFR  (AFRICAN AMERICAN): 41.3 ML/MIN/1.73 M^2
EST. GFR  (AFRICAN AMERICAN): 44 ML/MIN/1.73 M^2
EST. GFR  (NON AFRICAN AMERICAN): 35.7 ML/MIN/1.73 M^2
EST. GFR  (NON AFRICAN AMERICAN): 38.1 ML/MIN/1.73 M^2
GIANT PLATELETS BLD QL SMEAR: PRESENT
GLUCOSE SERPL-MCNC: 110 MG/DL
GLUCOSE SERPL-MCNC: 114 MG/DL
GLUCOSE SERPL-MCNC: 125 MG/DL
GLUCOSE SERPL-MCNC: 95 MG/DL
HCT VFR BLD AUTO: 27.4 %
HGB BLD-MCNC: 9.5 G/DL
HYPOCHROMIA BLD QL SMEAR: ABNORMAL
INR PPP: 1.1
LDH SERPL L TO P-CCNC: 315 U/L
LYMPHOCYTES # BLD AUTO: ABNORMAL K/UL
LYMPHOCYTES NFR BLD: 17 %
MAGNESIUM SERPL-MCNC: 2.3 MG/DL
MAGNESIUM SERPL-MCNC: 2.3 MG/DL
MAGNESIUM SERPL-MCNC: 2.4 MG/DL
MAGNESIUM SERPL-MCNC: 2.5 MG/DL
MCH RBC QN AUTO: 27.9 PG
MCHC RBC AUTO-ENTMCNC: 34.7 G/DL
MCV RBC AUTO: 81 FL
METHEMOGLOBIN: 0.8 % (ref 0–3)
MONOCYTES # BLD AUTO: ABNORMAL K/UL
MONOCYTES NFR BLD: 12 %
NEUTROPHILS NFR BLD: 69 %
NEUTS BAND NFR BLD MANUAL: 1 %
NRBC BLD-RTO: ABNORMAL /100 WBC
OVALOCYTES BLD QL SMEAR: ABNORMAL
PHOSPHATE SERPL-MCNC: 2.9 MG/DL
PLATELET # BLD AUTO: 206 K/UL
PLATELET BLD QL SMEAR: ABNORMAL
PMV BLD AUTO: 10.3 FL
POCT GLUCOSE: 126 MG/DL (ref 70–110)
POIKILOCYTOSIS BLD QL SMEAR: SLIGHT
POLYCHROMASIA BLD QL SMEAR: ABNORMAL
POTASSIUM SERPL-SCNC: 3.9 MMOL/L
POTASSIUM SERPL-SCNC: 4.1 MMOL/L
POTASSIUM SERPL-SCNC: 4.3 MMOL/L
POTASSIUM SERPL-SCNC: 4.3 MMOL/L
PROT SERPL-MCNC: 6.7 G/DL
PROTHROMBIN TIME: 12 SEC
RBC # BLD AUTO: 3.4 M/UL
SCHISTOCYTES BLD QL SMEAR: ABNORMAL
SODIUM SERPL-SCNC: 133 MMOL/L
SODIUM SERPL-SCNC: 133 MMOL/L
SODIUM SERPL-SCNC: 134 MMOL/L
SODIUM SERPL-SCNC: 135 MMOL/L
TARGETS BLD QL SMEAR: ABNORMAL
WBC # BLD AUTO: 9.84 K/UL

## 2017-08-02 PROCEDURE — 83615 LACTATE (LD) (LDH) ENZYME: CPT

## 2017-08-02 PROCEDURE — 83735 ASSAY OF MAGNESIUM: CPT | Mod: 91

## 2017-08-02 PROCEDURE — 20000000 HC ICU ROOM

## 2017-08-02 PROCEDURE — 25000003 PHARM REV CODE 250: Performed by: STUDENT IN AN ORGANIZED HEALTH CARE EDUCATION/TRAINING PROGRAM

## 2017-08-02 PROCEDURE — 99900035 HC TECH TIME PER 15 MIN (STAT)

## 2017-08-02 PROCEDURE — 83880 ASSAY OF NATRIURETIC PEPTIDE: CPT

## 2017-08-02 PROCEDURE — 94761 N-INVAS EAR/PLS OXIMETRY MLT: CPT

## 2017-08-02 PROCEDURE — 99232 SBSQ HOSP IP/OBS MODERATE 35: CPT | Mod: ,,, | Performed by: INTERNAL MEDICINE

## 2017-08-02 PROCEDURE — 84100 ASSAY OF PHOSPHORUS: CPT

## 2017-08-02 PROCEDURE — 99233 SBSQ HOSP IP/OBS HIGH 50: CPT | Mod: 24,,, | Performed by: THORACIC SURGERY (CARDIOTHORACIC VASCULAR SURGERY)

## 2017-08-02 PROCEDURE — 27000248 HC VAD-ADDITIONAL DAY

## 2017-08-02 PROCEDURE — 63600175 PHARM REV CODE 636 W HCPCS: Performed by: STUDENT IN AN ORGANIZED HEALTH CARE EDUCATION/TRAINING PROGRAM

## 2017-08-02 PROCEDURE — 85007 BL SMEAR W/DIFF WBC COUNT: CPT

## 2017-08-02 PROCEDURE — A4216 STERILE WATER/SALINE, 10 ML: HCPCS | Performed by: STUDENT IN AN ORGANIZED HEALTH CARE EDUCATION/TRAINING PROGRAM

## 2017-08-02 PROCEDURE — 99233 SBSQ HOSP IP/OBS HIGH 50: CPT | Mod: GC,,, | Performed by: ANESTHESIOLOGY

## 2017-08-02 PROCEDURE — 85027 COMPLETE CBC AUTOMATED: CPT

## 2017-08-02 PROCEDURE — 92526 ORAL FUNCTION THERAPY: CPT

## 2017-08-02 PROCEDURE — 83050 HGB METHEMOGLOBIN QUAN: CPT

## 2017-08-02 PROCEDURE — 86140 C-REACTIVE PROTEIN: CPT

## 2017-08-02 PROCEDURE — 97116 GAIT TRAINING THERAPY: CPT

## 2017-08-02 PROCEDURE — 99233 SBSQ HOSP IP/OBS HIGH 50: CPT | Mod: ,,, | Performed by: INTERNAL MEDICINE

## 2017-08-02 PROCEDURE — 37799 UNLISTED PX VASCULAR SURGERY: CPT

## 2017-08-02 PROCEDURE — 93750 INTERROGATION VAD IN PERSON: CPT | Performed by: THORACIC SURGERY (CARDIOTHORACIC VASCULAR SURGERY)

## 2017-08-02 PROCEDURE — 25000003 PHARM REV CODE 250: Performed by: THORACIC SURGERY (CARDIOTHORACIC VASCULAR SURGERY)

## 2017-08-02 PROCEDURE — A4216 STERILE WATER/SALINE, 10 ML: HCPCS | Performed by: THORACIC SURGERY (CARDIOTHORACIC VASCULAR SURGERY)

## 2017-08-02 PROCEDURE — 85730 THROMBOPLASTIN TIME PARTIAL: CPT | Mod: 91

## 2017-08-02 PROCEDURE — 80048 BASIC METABOLIC PNL TOTAL CA: CPT

## 2017-08-02 PROCEDURE — 93750 INTERROGATION VAD IN PERSON: CPT | Mod: ,,, | Performed by: INTERNAL MEDICINE

## 2017-08-02 PROCEDURE — 63600367 HC NITRIC OXIDE PER HOUR

## 2017-08-02 PROCEDURE — 93750 INTERROGATION VAD IN PERSON: CPT | Mod: ,,, | Performed by: THORACIC SURGERY (CARDIOTHORACIC VASCULAR SURGERY)

## 2017-08-02 PROCEDURE — 80076 HEPATIC FUNCTION PANEL: CPT

## 2017-08-02 PROCEDURE — 27000221 HC OXYGEN, UP TO 24 HOURS

## 2017-08-02 PROCEDURE — 85610 PROTHROMBIN TIME: CPT

## 2017-08-02 RX ORDER — HEPARIN SODIUM 10000 [USP'U]/100ML
600 INJECTION, SOLUTION INTRAVENOUS CONTINUOUS
Status: DISCONTINUED | OUTPATIENT
Start: 2017-08-02 | End: 2017-08-09

## 2017-08-02 RX ORDER — ONDANSETRON 2 MG/ML
4 INJECTION INTRAMUSCULAR; INTRAVENOUS EVERY 6 HOURS PRN
Status: DISCONTINUED | OUTPATIENT
Start: 2017-08-02 | End: 2017-08-02

## 2017-08-02 RX ORDER — ONDANSETRON 2 MG/ML
4 INJECTION INTRAMUSCULAR; INTRAVENOUS EVERY 6 HOURS PRN
Status: DISCONTINUED | OUTPATIENT
Start: 2017-08-02 | End: 2017-09-22 | Stop reason: HOSPADM

## 2017-08-02 RX ADMIN — FUROSEMIDE 20 MG/HR: 10 INJECTION, SOLUTION INTRAMUSCULAR; INTRAVENOUS at 01:08

## 2017-08-02 RX ADMIN — POLYETHYLENE GLYCOL 3350 17 G: 17 POWDER, FOR SOLUTION ORAL at 09:08

## 2017-08-02 RX ADMIN — POTASSIUM CHLORIDE 20 MEQ: 750 CAPSULE, EXTENDED RELEASE ORAL at 09:08

## 2017-08-02 RX ADMIN — WARFARIN SODIUM 6 MG: 5 TABLET ORAL at 05:08

## 2017-08-02 RX ADMIN — Medication 10 ML: at 05:08

## 2017-08-02 RX ADMIN — DOCUSATE SODIUM 200 MG: 100 CAPSULE, LIQUID FILLED ORAL at 08:08

## 2017-08-02 RX ADMIN — NICARDIPINE HYDROCHLORIDE 2 MG/HR: 0.2 INJECTION, SOLUTION INTRAVENOUS at 03:08

## 2017-08-02 RX ADMIN — BISACODYL 10 MG: 10 SUPPOSITORY RECTAL at 12:08

## 2017-08-02 RX ADMIN — ONDANSETRON 4 MG: 2 INJECTION INTRAMUSCULAR; INTRAVENOUS at 03:08

## 2017-08-02 RX ADMIN — CHLOROTHIAZIDE SODIUM 500 MG: 500 INJECTION, POWDER, LYOPHILIZED, FOR SOLUTION INTRAVENOUS at 05:08

## 2017-08-02 RX ADMIN — Medication 3 ML: at 02:08

## 2017-08-02 RX ADMIN — PANTOPRAZOLE SODIUM 40 MG: 40 TABLET, DELAYED RELEASE ORAL at 09:08

## 2017-08-02 RX ADMIN — FERROUS GLUCONATE TAB 324 MG (37.5 MG ELEMENTAL IRON) 324 MG: 324 (37.5 FE) TAB at 07:08

## 2017-08-02 RX ADMIN — AMIODARONE HYDROCHLORIDE 400 MG: 200 TABLET ORAL at 08:08

## 2017-08-02 RX ADMIN — POLYETHYLENE GLYCOL 3350 17 G: 17 POWDER, FOR SOLUTION ORAL at 08:08

## 2017-08-02 RX ADMIN — Medication 10 ML: at 12:08

## 2017-08-02 RX ADMIN — Medication 3 ML: at 09:08

## 2017-08-02 RX ADMIN — AMIODARONE HYDROCHLORIDE 400 MG: 200 TABLET ORAL at 09:08

## 2017-08-02 RX ADMIN — FUROSEMIDE 20 MG/HR: 10 INJECTION, SOLUTION INTRAMUSCULAR; INTRAVENOUS at 11:08

## 2017-08-02 RX ADMIN — POTASSIUM CHLORIDE 40 MEQ: 200 INJECTION, SOLUTION INTRAVENOUS at 04:08

## 2017-08-02 RX ADMIN — Medication 10 ML: at 11:08

## 2017-08-02 RX ADMIN — PRAVASTATIN SODIUM 20 MG: 20 TABLET ORAL at 08:08

## 2017-08-02 RX ADMIN — HYDRALAZINE HYDROCHLORIDE 10 MG: 20 INJECTION INTRAMUSCULAR; INTRAVENOUS at 10:08

## 2017-08-02 RX ADMIN — ONDANSETRON 4 MG: 2 INJECTION INTRAMUSCULAR; INTRAVENOUS at 08:08

## 2017-08-02 RX ADMIN — HEPARIN SODIUM AND DEXTROSE 1100 UNITS/HR: 10000; 5 INJECTION INTRAVENOUS at 06:08

## 2017-08-02 RX ADMIN — HYDRALAZINE HYDROCHLORIDE 10 MG: 20 INJECTION INTRAMUSCULAR; INTRAVENOUS at 03:08

## 2017-08-02 RX ADMIN — NICARDIPINE HYDROCHLORIDE 2.5 MG/HR: 0.2 INJECTION, SOLUTION INTRAVENOUS at 09:08

## 2017-08-02 RX ADMIN — ONDANSETRON 4 MG: 2 INJECTION INTRAMUSCULAR; INTRAVENOUS at 07:08

## 2017-08-02 RX ADMIN — ASPIRIN 325 MG: 325 TABLET, DELAYED RELEASE ORAL at 09:08

## 2017-08-02 RX ADMIN — Medication 10 ML: at 06:08

## 2017-08-02 RX ADMIN — Medication 3 ML: at 06:08

## 2017-08-02 RX ADMIN — AMLODIPINE BESYLATE 10 MG: 10 TABLET ORAL at 09:08

## 2017-08-02 RX ADMIN — EPINEPHRINE 0.03 MCG/KG/MIN: 1 INJECTION PARENTERAL at 03:08

## 2017-08-02 RX ADMIN — FUROSEMIDE 20 MG/HR: 10 INJECTION, SOLUTION INTRAMUSCULAR; INTRAVENOUS at 12:08

## 2017-08-02 NOTE — ASSESSMENT & PLAN NOTE
- LVAD HM III  - Speed 5200  - no events  - LDH increased to 507. Monitor daily  - INR 1.1  - on heparin gtt

## 2017-08-02 NOTE — PROGRESS NOTES
[facility]  Cardiac Electrophysiology  Progress Note    Admission Date: 7/18/2017  Code Status: Prior   Attending Physician: Sunny Downing MD   Expected Discharge Date: 8/10/2017  Principal Problem:Acute on chronic combined systolic and diastolic heart failure    Subjective:     Interval History: no events overnight    Review of Systems   Constitution: Negative.   HENT: Negative.    Eyes: Negative.    Cardiovascular: Negative.    Respiratory: Negative.    Endocrine: Negative.    Skin: Negative.    Musculoskeletal: Negative.    Gastrointestinal: Negative.    Genitourinary: Negative.    Neurological: Negative.      Objective:     Vital Signs (Most Recent):  Temp: 98.1 °F (36.7 °C) (08/02/17 1100)  Pulse: 102 (08/02/17 1100)  Resp: (!) 23 (08/02/17 1100)  BP: (!) 141/99 (08/02/17 0900)  SpO2: 100 % (08/02/17 0800) Vital Signs (24h Range):  Temp:  [97.8 °F (36.6 °C)-98.1 °F (36.7 °C)] 98.1 °F (36.7 °C)  Pulse:  [] 102  Resp:  [18-30] 23  SpO2:  [96 %-100 %] 100 %  BP: ()/(0-99) 141/99  Arterial Line BP: ()/(57-86) 87/70     Weight: 84.8 kg (186 lb 15.2 oz)  Body mass index is 28.43 kg/m².     SpO2: 100 %  O2 Device (Oxygen Therapy): nasal cannula    Physical Exam   Constitutional: He is oriented to person, place, and time. He appears well-developed and well-nourished.   HENT:   Head: Normocephalic and atraumatic.   Eyes: Conjunctivae and EOM are normal. Pupils are equal, round, and reactive to light.   Neck: Normal range of motion. Neck supple. No JVD present.   Cardiovascular:   Smooth vad sounds     Pulmonary/Chest: Effort normal and breath sounds normal. No respiratory distress. He has no wheezes. He has no rales. He exhibits no tenderness.   Abdominal: Soft. Bowel sounds are normal. He exhibits no distension. There is no tenderness.   Musculoskeletal: Normal range of motion. He exhibits no edema or tenderness.   Neurological: He is alert and oriented to person, place, and time.    Skin: Skin is warm and dry. No erythema. No pallor.       Significant Labs:   CMP:   Recent Labs  Lab 08/01/17  2152 08/02/17  0310 08/02/17  0933   * 135* 133*   K 4.1 3.9 4.3   CL 99 99 100   CO2 24 24 21*   * 110 125*   BUN 41* 41* 41*   CREATININE 1.7* 1.8* 1.8*   CALCIUM 8.8 8.8 9.1   PROT  --  6.7  --    ALBUMIN  --  3.0*  --    BILITOT  --  2.3*  --    ALKPHOS  --  56  --    AST  --  24  --    ALT  --  22  --    ANIONGAP 11 12 12   ESTGFRAFRICA 47.2* 44.0* 44.0*   EGFRNONAA 40.8* 38.1* 38.1*   , CBC:   Recent Labs  Lab 08/01/17  0405 08/02/17  0310   WBC 11.36 9.84   HGB 9.4* 9.5*   HCT 26.5* 27.4*    206    and INR:   Recent Labs  Lab 08/01/17  0405 08/02/17  0310   INR 1.1 1.1       Significant Imaging: Echocardiogram:   2D echo with color flow doppler:   Results for orders placed or performed during the hospital encounter of 07/18/17   2D echo with color flow doppler   Result Value Ref Range    Est. PA Systolic Pressure 33.87     Tricuspid Valve Regurgitation MILD      Assessment and Plan:   Mr. Hayden is a 67 year old man with advanced NICM (LVEF 10%) on home , HTN, HLD with [employer] CRT-D who presented 7/18 morning with a syncopal episode during a 6MWT followed by an ICD shock x 2. On interrogation, he had been shocked 5 times since 7/14, and 4 times in last two days prior to admit. He is now s/p HMIII LVAD and RV lead on ICD is not pacing.  ICD is currently off.    AICD (automatic cardioverter/defibrillator) present    -bridging to coumadin, INR still 1.1. Cannot perform while still on heparin due to bleeding risk so need to await therapeutic INR before any interventions.  -Patient needs RV lead replacement vs revision given persistent dysfunction of RV lead  -ICD is off but patient has pads to chest wall in case it is needed  -has frequent PVCs at his baseline and this continues  -Has LVAD (HMIII) in place at this time        AICD discharge    Mr. Hayden is a 67 year old man  with advanced NICM (LVEF 10%) awaiting LVAD, HTN HLD who presented with syncope and ICD discharge for VT. Underlying his VT, he also has an organized atrial tachycardia which appears to be new from his EGM since May 2017. He is acutely ill and would benefit from aggressive AAD therapy and treatment of his decompensated heart failure. Furthermore, with newly discovered atrial tachyarrhythmia, he would benefit from full anticoagulation in conjunction with AAD therapy. He has started amiodarone IV load this afternoon.    --continue full IV amiodarone load; plan on transition to PO amiodarone 400mg BID tomorrow x 2 weeks then likely 400mg daily thereafter  --initiate heparin gtt to prevent RUTH thrombus and CVA given atrial tachyarrhythmia and high CVA risk  --will discuss more definitive arrhythmia strategies once he is stabilized.        V-tach    Currently on amiodarone 400mg po BID   Continue Mg and K repletion maintaining K>4, Mg>2            Casey Newman MD  Cardiac Electrophysiology  [facility]

## 2017-08-02 NOTE — SUBJECTIVE & OBJECTIVE
Interval History: no events overnight    Review of Systems   Constitution: Negative.   HENT: Negative.    Eyes: Negative.    Cardiovascular: Negative.    Respiratory: Negative.    Endocrine: Negative.    Skin: Negative.    Musculoskeletal: Negative.    Gastrointestinal: Negative.    Genitourinary: Negative.    Neurological: Negative.      Objective:     Vital Signs (Most Recent):  Temp: 98.1 °F (36.7 °C) (08/02/17 1100)  Pulse: 102 (08/02/17 1100)  Resp: (!) 23 (08/02/17 1100)  BP: (!) 141/99 (08/02/17 0900)  SpO2: 100 % (08/02/17 0800) Vital Signs (24h Range):  Temp:  [97.8 °F (36.6 °C)-98.1 °F (36.7 °C)] 98.1 °F (36.7 °C)  Pulse:  [] 102  Resp:  [18-30] 23  SpO2:  [96 %-100 %] 100 %  BP: ()/(0-99) 141/99  Arterial Line BP: ()/(57-86) 87/70     Weight: 84.8 kg (186 lb 15.2 oz)  Body mass index is 28.43 kg/m².     SpO2: 100 %  O2 Device (Oxygen Therapy): nasal cannula    Physical Exam   Constitutional: He is oriented to person, place, and time. He appears well-developed and well-nourished.   HENT:   Head: Normocephalic and atraumatic.   Eyes: Conjunctivae and EOM are normal. Pupils are equal, round, and reactive to light.   Neck: Normal range of motion. Neck supple. No JVD present.   Cardiovascular:   Smooth vad sounds     Pulmonary/Chest: Effort normal and breath sounds normal. No respiratory distress. He has no wheezes. He has no rales. He exhibits no tenderness.   Abdominal: Soft. Bowel sounds are normal. He exhibits no distension. There is no tenderness.   Musculoskeletal: Normal range of motion. He exhibits no edema or tenderness.   Neurological: He is alert and oriented to person, place, and time.   Skin: Skin is warm and dry. No erythema. No pallor.       Significant Labs:   CMP:   Recent Labs  Lab 08/01/17  2152 08/02/17  0310 08/02/17  0933   * 135* 133*   K 4.1 3.9 4.3   CL 99 99 100   CO2 24 24 21*   * 110 125*   BUN 41* 41* 41*   CREATININE 1.7* 1.8* 1.8*   CALCIUM 8.8 8.8  9.1   PROT  --  6.7  --    ALBUMIN  --  3.0*  --    BILITOT  --  2.3*  --    ALKPHOS  --  56  --    AST  --  24  --    ALT  --  22  --    ANIONGAP 11 12 12   ESTGFRAFRICA 47.2* 44.0* 44.0*   EGFRNONAA 40.8* 38.1* 38.1*   , CBC:   Recent Labs  Lab 08/01/17  0405 08/02/17  0310   WBC 11.36 9.84   HGB 9.4* 9.5*   HCT 26.5* 27.4*    206    and INR:   Recent Labs  Lab 08/01/17  0405 08/02/17  0310   INR 1.1 1.1       Significant Imaging: Echocardiogram:   2D echo with color flow doppler:   Results for orders placed or performed during the hospital encounter of 07/18/17   2D echo with color flow doppler   Result Value Ref Range    Est. PA Systolic Pressure 33.87     Tricuspid Valve Regurgitation MILD

## 2017-08-02 NOTE — PROCEDURES
Patient AAO with  family at bedside. VAD interrogation completed this AM in the event changes needed to be made. Will continue to monitor for further issues.     Pulsatile: Yes   VAD Sounds: HM3  Smooth  Problems / Issues / Alarms with VAD if any: None noted  HCT: 27.4  Modular Cable Connection Intact(no yellow exposed): YES     VAD Interrogation:  TXP LVAD INTERROGATIONS 8/2/2017 8/2/2017 8/2/2017 8/2/2017 8/2/2017 8/2/2017 8/2/2017   Type HeartMate3 HeartMate3 HeartMate3 HeartMate3 HeartMate3 HeartMate3 HeartMate3   Flow 4.3 3.8 4.1 3.8 4.1 4 4.1   Speed 5200 5250 5200 5200 5200 5200 5200   PI 3.2 5.7 4.7 5.3 4 4.6 3.7   Power ([place]) 3.6 3.6 3.6 3.7 3.6 3.6 3.6   LSL - - - - 4800 - -   Pulsatility - - - - - - -   }

## 2017-08-02 NOTE — PT/OT/SLP PROGRESS
Physical Therapy  Co-Treatment with OT    Suman Hayden   MRN: [id number]   Admitting Diagnosis: Acute on chronic combined systolic and diastolic heart failure    PT Received On: 08/02/17  PT Start Time: 0814     PT Stop Time: 0837    PT Total Time (min): 23 min       Billable Minutes:  Gait [id number] min    Treatment Type: Other (see comments) (co-treatment with OT)  PT/PTA: PT     PTA Visit Number: 0       General Precautions: Standard, LVAD, fall, sternal  Orthopedic Precautions: N/A   Braces:      Do you have any cultural, spiritual, Rastafari conflicts, given your current situation?: none noted     Subjective:  Communicated with nurse prior to session.      Pain/Comfort  Pain Rating 1: 0/10  Pain Rating Post-Intervention 1: 0/10    Objective:   Patient found with: telemetry, arterial line, central line, oxygen, chest tube (LVAD, nitric oxide, CVP,)    Functional Mobility:  Bed Mobility:   Rolling/Turning to Left:  (not tested. pt was sitting in chair for treatment. )    Transfers:  Sit <> Stand Assistance: Moderate Assistance  Sit <> Stand Assistive Device: No Assistive Device    Gait:   Gait Distance: 16 ft, 20 ft. pt had sitting rest period between distance ambulated.  pt stood at sink and performed ADLS with OT and PT assisted with static standing balance.   Assistance 1: Total assistance (min assist x 2.  pt needed verbal cues to increase base of support with gait training. )  Gait Assistive Device: Hand held assist  Gait Pattern: 2-point gait      AM-PAC 6 CLICK MOBILITY  How much help from another person does this patient currently need?   1 = Unable, Total/Dependent Assistance  2 = A lot, Maximum/Moderate Assistance  3 = A little, Minimum/Contact Guard/Supervision  4 = None, Modified Jack/Independent    Turning over in bed (including adjusting bedclothes, sheets and blankets)?: 2  Sitting down on and standing up from a chair with arms (e.g., wheelchair, bedside commode, etc.): 2  Moving from lying  on back to sitting on the side of the bed?: 2  Moving to and from a bed to a chair (including a wheelchair)?: 3  Need to walk in hospital room?: 2  Climbing 3-5 steps with a railing?: 1  Total Score: 12    AM-PAC Raw Score CMS G-Code Modifier Level of Impairment Assistance   6 % Total / Unable   7 - 9 CM 80 - 100% Maximal Assist   10 - 14 CL 60 - 80% Moderate Assist   15 - 19 CK 40 - 60% Moderate Assist   20 - 22 CJ 20 - 40% Minimal Assist   23 CI 1-20% SBA / CGA   24 CH 0% Independent/ Mod I     Patient left up in chair with all lines intact, call button in reach and wife present.    Assessment:  Suman Hayden is a 67 y.o. male with a medical diagnosis of Acute on chronic combined systolic and diastolic heart failure and presents with decreased mobility, transfers and decreased distance ambulated. Pt would benefit from cont PT to address deficits and will need HHPT when discharged. Pt will benefit from skilled PT 6x/wk to progress to Independent status. Pt is s/p  LVAD HM3 placement , chest closure 17. Pt progressed by increasing distance ambulated.     Rehab identified problem list/impairments: Rehab identified problem list/impairments: weakness, impaired functional mobilty, gait instability    Rehab potential is good.    Activity tolerance: Good    Discharge recommendations: Discharge Facility/Level Of Care Needs: home health PT     Barriers to discharge: Barriers to Discharge: None    Equipment recommendations: Equipment Needed After Discharge: none     GOALS:    Physical Therapy Goals        Problem: Physical Therapy Goal    Goal Priority Disciplines Outcome Goal Variances Interventions   Physical Therapy Goal     PT/OT, PT Ongoing (interventions implemented as appropriate)     Description:  Goals to be met by: 17     Patient will increase functional independence with mobility by performin. Supine to sit with MInimal Assistance- not met  2. Sit to supine with MInimal Assistance - not  met  3. Sit to stand transfer with Minimal Assistance- not met  4. Bed to chair transfer with Minimal Assistance- not met  5. Gait  x 50 feet with Minimal Assistance. - not met  6. Lower extremity exercise program x15 reps, with supervision, in order to increase LE strength and (I) with functional mobility. - not met                       PLAN:    Patient to be seen 6 x/week  to address the above listed problems via gait training, therapeutic activities, therapeutic exercises  Plan of Care expires: 08/27/17  Plan of Care reviewed with: patient, spouse         Astridmichael Whaley, PT  08/02/2017

## 2017-08-02 NOTE — SUBJECTIVE & OBJECTIVE
Interval History: in chair this morning, some improvement in nausea     Continuous Infusions:   dextrose 5 % and 0.9 % NaCl 3 mL/hr at 08/01/17 0700    DOBUTamine 5 mcg/kg/min (08/02/17 1000)    epinephrine infusion (NON-TITRATING) 0.03 mcg/kg/min (08/02/17 1000)    furosemide (LASIX) 1 mg/mL infusion (non-titrating) 20 mg/hr (08/02/17 1000)    heparin (porcine) in 5 % dex 1,100 Units/hr (08/02/17 1000)    nicardipine Stopped (08/02/17 0700)    nitric oxide gas       Scheduled Meds:   amiodarone  400 mg Oral BID    amlodipine  10 mg Oral Daily    aspirin  325 mg Oral Daily    docusate sodium  200 mg Oral QHS    ferrous gluconate  324 mg Oral Daily with breakfast    magnesium oxide  400 mg Oral TID    pantoprazole  40 mg Oral Daily    polyethylene glycol  17 g Oral BID    potassium chloride  20 mEq Oral BID    pravastatin  20 mg Oral QHS    sodium chloride 0.9%  10 mL Intravenous Q6H    sodium chloride 0.9%  3 mL Intravenous Q8H    warfarin  6 mg Oral Daily     PRN Meds:albumin human 5%, albuterol sulfate, bisacodyl, fentaNYL, hydrALAZINE, magnesium hydroxide 400 mg/5 ml, magnesium sulfate IVPB, ondansetron, oxycodone, oxycodone, potassium chloride, potassium chloride, Flushing PICC Protocol **AND** sodium chloride 0.9% **AND** sodium chloride 0.9%, sodium phosphate IVPB, sodium phosphate IVPB, sodium phosphate IVPB    Review of patient's allergies indicates:  No Known Allergies  Objective:     Vital Signs (Most Recent):  Temp: 97.8 °F (36.6 °C) (08/02/17 0700)  Pulse: 95 (08/02/17 1000)  Resp: (!) 21 (08/02/17 1000)  BP: (!) 141/99 (08/02/17 0900)  SpO2: 100 % (08/02/17 0800) Vital Signs (24h Range):  Temp:  [97.8 °F (36.6 °C)-98 °F (36.7 °C)] 97.8 °F (36.6 °C)  Pulse:  [] 95  Resp:  [18-30] 21  SpO2:  [96 %-100 %] 100 %  BP: ()/(0-99) 141/99  Arterial Line BP: ()/(57-86) 93/70     Weight: 84.8 kg (186 lb 15.2 oz)  Body mass index is 28.43 kg/m².      Intake/Output Summary (Last  24 hours) at 08/02/17 1104  Last data filed at 08/02/17 1000   Gross per 24 hour   Intake             2905 ml   Output             2830 ml   Net               75 ml       Hemodynamic Parameters:           Physical Exam   Constitutional: He appears well-developed and well-nourished. No distress.   HENT:   Head: Normocephalic.   Eyes: Pupils are equal, round, and reactive to light.   Neck: Normal range of motion. No JVD present.   Cardiovascular: Normal rate.  Exam reveals no gallop and no friction rub.    No murmur heard.  Pulmonary/Chest: Effort normal. No respiratory distress. He has no wheezes. He has no rales.   Abdominal: Soft. He exhibits no distension. There is no tenderness. There is no guarding.   Musculoskeletal: Normal range of motion. He exhibits no edema.   Neurological: He is alert. No cranial nerve deficit. Coordination normal.   Skin: Skin is warm. He is not diaphoretic. No erythema.       Significant Labs:  CBC:    Recent Labs  Lab 08/02/17  0310   WBC 9.84   RBC 3.40*   HGB 9.5*   HCT 27.4*      MCV 81*   MCH 27.9   MCHC 34.7     BNP:    Recent Labs  Lab 08/02/17  0310   BNP 1,398*     CMP:    Recent Labs  Lab 08/02/17  0310 08/02/17  0933    125*   CALCIUM 8.8 9.1   ALBUMIN 3.0*  --    PROT 6.7  --    * 133*   K 3.9 4.3   CO2 24 21*   CL 99 100   BUN 41* 41*   CREATININE 1.8* 1.8*   ALKPHOS 56  --    ALT 22  --    AST 24  --    BILITOT 2.3*  --       Coagulation:     Recent Labs  Lab 08/02/17  0310 08/02/17  0933   INR 1.1  --    APTT 45.3* 39.0*     LDH:    Recent Labs  Lab 07/31/17  0400 08/01/17  0405 08/02/17  0310   * 345* 315*     Microbiology:  Microbiology Results (last 7 days)     Procedure Component Value Units Date/Time    Blood culture [[id number]] Collected:  07/24/17 1300    Order Status:  Completed Specimen:  Blood from Peripheral, Hand, Right Updated:  07/29/17 1812     Blood Culture, Routine No growth after 5 days.    Urine culture [[id number]]   (Susceptibility) Collected:  07/24/17 1139    Order Status:  Completed Specimen:  Urine from Urine, Clean Catch Updated:  07/29/17 1506     Urine Culture, Routine --     ENTEROCOCCUS FAECALIS  >100,000 cfu/ml  No other significant isolate      Blood culture [[id number]] Collected:  07/24/17 1100    Order Status:  Completed Specimen:  Blood from Peripheral, Antecubital, Left Updated:  07/29/17 1412     Blood Culture, Routine No growth after 5 days.          I have reviewed all pertinent labs within the past 24 hours.    Estimated Creatinine Clearance: 42.2 mL/min (based on Cr of 1.8).    Diagnostic Results:  I have reviewed and interpreted all pertinent imaging results/findings within the past 24 hours.

## 2017-08-02 NOTE — PROGRESS NOTES
Daily E and M and VAD Interrogation Note    Reason for Visit:  Patient is seen in follow up for management of:  [] HeartMate II  [] Heartware [] Total artificial heart       [] ECMO           [x] Other - HM III     Interval History:  [] Interval history unobtainable due to intubation.  The [x] implant/[] explant date was 7/27/17    Events - No acute events o/n. Pt with episode of vomiting o/n - 500 cc. Urine output between 100-200/hr - lasix gtt at 20 and pt received dose of diuril. Epi down to 0.03, dobutamine remains at 5. Clinically, up in chair this AM. Tolerating diet; although still no BM after miralax and Dulcolax as well as  Continued nausea.      Review of Systems:   Negative except as above.      Medications:  Current Facility-Administered Medications   Medication Dose Route Frequency Provider Last Rate Last Dose    albumin human 5% bottle 500 mL  500 mL Intravenous PRN Shayne Espinoza MD   500 mL at 07/28/17 1755    albuterol nebulizer solution 2.5 mg  2.5 mg Nebulization Q4H PRN Shayne Espinoza MD        amiodarone tablet 400 mg  400 mg Oral BID Shayne Espinoza MD   400 mg at 08/02/17 0900    amlodipine tablet 10 mg  10 mg Oral Daily Shayne Espinoza MD   10 mg at 08/02/17 0900    aspirin EC tablet 325 mg  325 mg Oral Daily Shayne Espinoza MD   325 mg at 08/02/17 0900    bisacodyl suppository 10 mg  10 mg Rectal Daily PRN Shayne Espinoza MD   10 mg at 07/31/17 1733    dextrose 5 % and 0.9 % NaCl infusion   Intravenous Continuous Shayne Espinoza MD 3 mL/hr at 08/01/17 0700      DOBUtamine 1000 mg in D5W 250 mL infusion (premix non-titrating)  5 mcg/kg/min (Dosing Weight) Intravenous Continuous Shayne Espinoza MD 6 mL/hr at 08/02/17 0900 5 mcg/kg/min at 08/02/17 0900    docusate sodium capsule 200 mg  200 mg Oral QHS Shayne Espinoza MD   200 mg at 08/01/17 2114    EPINEPHrine (ADRENALIN) 4 mg in sodium chloride 0.9% 250 mL infusion  0.03 mcg/kg/min (Dosing  Weight) Intravenous Continuous Shayne Espinoza MD 9 mL/hr at 08/02/17 0900 0.03 mcg/kg/min at 08/02/17 0900    fentaNYL injection 50 mcg  50 mcg Intravenous Q4H PRN Shayne Espinoza MD   50 mcg at 07/29/17 0827    ferrous gluconate tablet 324 mg  324 mg Oral Daily with breakfast Shayne Espinoza MD   324 mg at 08/02/17 0745    furosemide (LASIX) 250 mg in sodium chloride 0.9 % 250 mL infusion (non-titrating)  20 mg/hr Intravenous Continuous Edwige Clay MD 20 mL/hr at 08/02/17 0900 20 mg/hr at 08/02/17 0900    heparin 25,000 units in dextrose 5% 250 mL (100 units/mL) infusion (heparin infusion)  1,100 Units/hr Intravenous Continuous Shayne Espinoza MD 11 mL/hr at 08/02/17 0900 1,100 Units/hr at 08/02/17 0900    hydrALAZINE injection 10 mg  10 mg Intravenous Q6H PRN Shayne Espinoza MD   10 mg at 08/01/17 2313    magnesium hydroxide 400 mg/5 ml suspension 2,400 mg  30 mL Oral Daily PRN Shayne Espinoza MD        magnesium oxide tablet 400 mg  400 mg Oral TID Shayne Espinoza MD   Stopped at 08/02/17 0600    magnesium sulfate 2g in water 50mL IVPB (premix)  2 g Intravenous PRN Shayne Espinoza MD   2 g at 07/30/17 0731    niCARdipine 40 mg/200 mL infusion  1 mg/hr Intravenous Continuous Shayne Espinoza MD   Stopped at 08/02/17 0700    nitric oxide gas Gas 5 ppm  5 ppm Inhalation Continuous Edwige Clay MD   5 ppm at 07/31/17 2330    ondansetron injection 4 mg  4 mg Intravenous Q8H PRN Shayne Espinoza MD   4 mg at 08/02/17 0745    oxycodone immediate release tablet 10 mg  10 mg Oral Q4H PRN Shayne Espinoza MD        oxycodone immediate release tablet 5 mg  5 mg Oral Q4H PRN Shayne Espinoza MD   5 mg at 07/30/17 0817    pantoprazole EC tablet 40 mg  40 mg Oral Daily Shayne Espinoza MD   40 mg at 08/02/17 0900    polyethylene glycol packet 17 g  17 g Oral BID Shayne Espinoza MD   17 g at 08/02/17 0900    potassium chloride 20 mEq in 100 mL  IVPB (FOR CENTRAL LINE ADMINISTRATION ONLY)  40 mEq Intravenous PRN Shayne Espinoza MD 50 mL/hr at 08/02/17 0419 40 mEq at 08/02/17 0419    potassium chloride 20 mEq in 100 mL IVPB (FOR CENTRAL LINE ADMINISTRATION ONLY)  40 mEq Intravenous PRN Shayne Espinoza MD        potassium chloride CR capsule 20 mEq  20 mEq Oral BID Shayne Espinoza MD   20 mEq at 08/02/17 0900    pravastatin tablet 20 mg  20 mg Oral QHS Lorena Payton MD   20 mg at 08/01/17 2114    sodium chloride 0.9% flush 10 mL  10 mL Intravenous Q6H Sunny Downing MD   10 mL at 08/02/17 0646    And    sodium chloride 0.9% flush 10 mL  10 mL Intravenous PRN Sunny Downing MD        sodium chloride 0.9% flush 3 mL  3 mL Intravenous Q8H Shayne Espinoza MD   3 mL at 08/02/17 0646    sodium phosphate 15 mmol in dextrose 5 % 250 mL IVPB  15 mmol Intravenous PRN Edwige Clay MD 83.3 mL/hr at 07/29/17 2240 15 mmol at 07/29/17 2240    sodium phosphate 20.01 mmol in dextrose 5 % 250 mL IVPB  20.01 mmol Intravenous PRN Edwige Clay MD        sodium phosphate 30 mmol in dextrose 5 % 250 mL IVPB  30 mmol Intravenous PRN Edwige Clay MD        warfarin tablet 6 mg  6 mg Oral Daily Shayne Espinoza MD           Physical Examination:  Vital Signs:   Vitals:    08/02/17 0900   BP: (!) 141/99   Pulse: 98   Resp: (!) 24   Temp:      Cardiovascular:  [x] Regular rate and rhythm [] Irregular []  None (MATT) []  Other  []  No edema [x]  Edema present  [x]  Clear to auscultation  []  Rales to []  Coarse  []  No rales but   [] Pedal Pulses absent  [x]  Pulses  + throughout  Skin:  Incision is [x]  Clean, dry and intact.  []  Other   Sternum:  [x]  Stable []  Unstable  Driveline(s):   [x]  Clean, dry and intact. []  Other       Labs:  BMP  Lab Results   Component Value Date     (L) 08/02/2017    K 3.9 08/02/2017    CL 99 08/02/2017    CO2 24 08/02/2017    BUN 41 (H) 08/02/2017    CREATININE 1.8 (H) 08/02/2017    CALCIUM  8.8 08/02/2017    ANIONGAP 12 08/02/2017    ESTGFRAFRICA 44.0 (A) 08/02/2017    EGFRNONAA 38.1 (A) 08/02/2017       Magnesium   Date Value Ref Range Status   08/02/2017 2.4 1.6 - 2.6 mg/dL Final       Lab Results   Component Value Date    WBC 9.84 08/02/2017    HGB 9.5 (L) 08/02/2017    HCT 27.4 (L) 08/02/2017    MCV 81 (L) 08/02/2017     08/02/2017       Lab Results   Component Value Date    INR 1.1 08/02/2017    INR 1.1 08/01/2017    INR 1.1 07/31/2017       BNP   Date Value Ref Range Status   08/02/2017 1,398 (H) 0 - 99 pg/mL Final     Comment:     Values of less than 100 pg/ml are consistent with non-CHF populations.   07/31/2017 1,389 (H) 0 - 99 pg/mL Final     Comment:     Values of less than 100 pg/ml are consistent with non-CHF populations.   07/28/2017 1,184 (H) 0 - 99 pg/mL Final     Comment:     Values of less than 100 pg/ml are consistent with non-CHF populations.       LD   Date Value Ref Range Status   08/02/2017 315 (H) 110 - 260 U/L Final     Comment:     Results are increased in hemolyzed samples.   08/01/2017 345 (H) 110 - 260 U/L Final     Comment:     Results are increased in hemolyzed samples.   07/31/2017 380 (H) 110 - 260 U/L Final     Comment:     Results are increased in hemolyzed samples.       X-Rays:  [x]  I reviewed today's Chest x-ray    Procedure:  Device Interrogation including analysis of device parameters.  Current Settings   [x]  Ventricular Assist Device  []  Total Artificial Heart interrogated  Review of device function is [x]  Stable []  Other   TXP LVAD INTERROGATIONS 8/2/2017 8/2/2017 8/2/2017 8/2/2017 8/2/2017 8/2/2017 8/2/2017   Type HeartMate3 HeartMate3 HeartMate3 - - - -   Flow 4.1 4.2 4.3 4.3 4.2 4.1 4.2   Speed 5200 5200 5250 5200 5200 5200 5250   PI 3.7 3.4 3.3 3.3 3.3 4.4 3.5   Power ([place]) 3.6 3.5 3.6 3.7 3.6 3.6 3.6   LSL - - 4800 - - - -   Pulsatility - - - - - - -       Assessment:  [x]  Primary Cardiomyopathy []  Congestive Heart Failure   []  Atrial  Fibrillation []  Ventricular Tachycardia   []  Aftercare cardiac device []  Long term (current) use of anticoagulants   []  Ventilator-associated pneumonia []  Pneumonia viral, unspecified   []  Pneumonia, bacterial, unspecified []  Pneumonia, organism unspecified   []  Hemorrhage of GI tract, unspecified    []  Nosebleed []  Driveline infection   []  Infection VAD device []  New onset of    []        Plan:  [x]  Interval history obtained from ICU attending team member during rounding today  []  VAD/MATT teaching performed with patient  []  Mobilization / Physical Therapy ongoing  []  Anticoagulation []  Ongoing []  Held  []  Studies ordered  [x]   To OR today for closure.       Total time spent was 30 minutes.  Of which more than 50 percent of the care dominated counseling and coordinating care with different team members. The VAD was interrogated and all parameters were WNL and no significant findings were found in the history. All these findings are documented in the note above.    Neuro:  - Continue current pain control regimen    Resp:  - ExtubatedOxygen Concentration (%):  [100] 100  - Minimize supplemental O2 requirements  - Pulm toilet  - Neehmias 2.5 ppm in 1L NC - wean off today as tolerated by RV    CV:  - HDS; LVAD numbers stable o/n  -  from 345  - Amio 400 mg BID.   - Epi to 0.03  - Dobutamine to 5  - Off and on Cardene for htn   - Increase Norvasc to 10 mg   - PRN hydralazine   -     Heme:  - Hgb/Hct stable  - Continue to trend  - Coumadin tonight - 6 mg  - Heparin gtt     ID:  - Tmax 98  - WBC down to 9.8  - continue to monitor       Renal:  - Singer in place  - Strict I/Os   - Adequate UOP since increasing lasix and dose of diuril - 100-200 cc/hr o/n.  - Lasix gtt at 20 mg/hr  - Hold diuril     FEN/GI:  - Dental soft diet  - Miralax BID  - Replace lytes PRN    Endo:  - Endocrine following, appreciate assistance  - Accuchecks  - Insulin gtt    Dispo:  - Continue ICU care      Shayne Espinoza,  MD  General Surgery PGY-2  Pager: [phone]    Cardiac Surgery Attending E and M (VAD) Note along with VAD Interrogation    I have seen and examined the patient and agree with the findings above    I also reviewed the patients clinical course and:  [x]  Hemodynamic & Respiratory parameters  [x]  Laboratory Data  [x]  Radiological studies     VAD Interrogated [x]      VAD Function is normal. Changes made []  none [x]      Interrogation of Ventricular assist device was performed with physician analysis of device parameters and review of device function. I have personally reviewed the interrogation findings and agree with findings as stated.

## 2017-08-02 NOTE — PROGRESS NOTES
Progress Note  Surgical Intensive Care    Admit Date: 7/18/2017  Post-operative Day: 5 Days Post-Op  Hospital Day: 16    SUBJECTIVE:     Follow-up For:  Procedure(s) (LRB):  CLOSURE-STERNAL WOUND (N/A)  PLACEMENT-NEOPERICARDIUM (N/A)   S/p sternotomy closure 7/18/17    Interval history: Pt. Reports persistence of N/V overnight. Downtitration of epi, remians on dobutamine, cardene and Nehemias at 5ppm on 3L NC. Lasix and amio gtt stable. Pt. Out of bed to chair, working with PT.     Continuous Infusions:   dextrose 5 % and 0.9 % NaCl 3 mL/hr at 08/01/17 0700    DOBUTamine 5 mcg/kg/min (08/02/17 1200)    epinephrine infusion (NON-TITRATING) 0.03 mcg/kg/min (08/02/17 1200)    furosemide (LASIX) 1 mg/mL infusion (non-titrating) 20 mg/hr (08/02/17 1300)    heparin (porcine) in 5 % dex 1,100 Units/hr (08/02/17 1200)    nicardipine Stopped (08/02/17 0700)    nitric oxide gas       Scheduled Meds:   amiodarone  400 mg Oral BID    amlodipine  10 mg Oral Daily    aspirin  325 mg Oral Daily    docusate sodium  200 mg Oral QHS    ferrous gluconate  324 mg Oral Daily with breakfast    magnesium oxide  400 mg Oral TID    pantoprazole  40 mg Oral Daily    polyethylene glycol  17 g Oral BID    potassium chloride  20 mEq Oral BID    pravastatin  20 mg Oral QHS    sodium chloride 0.9%  10 mL Intravenous Q6H    sodium chloride 0.9%  3 mL Intravenous Q8H    warfarin  6 mg Oral Daily     PRN Meds:albumin human 5%, albuterol sulfate, bisacodyl, fentaNYL, hydrALAZINE, magnesium hydroxide 400 mg/5 ml, magnesium sulfate IVPB, ondansetron, oxycodone, oxycodone, potassium chloride, potassium chloride, Flushing PICC Protocol **AND** sodium chloride 0.9% **AND** sodium chloride 0.9%, sodium phosphate IVPB, sodium phosphate IVPB, sodium phosphate IVPB    Review of patient's allergies indicates:  No Known Allergies    OBJECTIVE:     Vital Signs (Most Recent)  Temp: 98.1 °F (36.7 °C) (08/02/17 1100)  Pulse: 103 (08/02/17  1200)  Resp: (!) 25 (08/02/17 1200)  BP: (!) 141/99 (08/02/17 0900)  SpO2: 98 % (08/02/17 1200)    Vital Signs Range (Last 24H):  Temp:  [97.8 °F (36.6 °C)-98.1 °F (36.7 °C)]   Pulse:  []   Resp:  [18-30]   BP: ()/(0-99)   SpO2:  [96 %-100 %]   Arterial Line BP: ()/(60-86)     I & O (Last 24H):    Intake/Output Summary (Last 24 hours) at 08/02/17 1219  Last data filed at 08/02/17 1100   Gross per 24 hour   Intake             2665 ml   Output             2785 ml   Net             -120 ml     Ventilator Data (Last 24H):     Oxygen Concentration (%):  [100] 100    Hemodynamic Parameters (Last 24H):       Physical Exam   Constitutional: He appears well-developed and well-nourished. No distress. Resting comfortably   HENT:   Head: Normocephalic and atraumatic.   Eyes: Right eye exhibits no discharge. Left eye exhibits no discharge. No scleral icterus.   Neck: Normal range of motion. Neck supple.   Cardiovascular: Intact distal pulses.    LVAD hum present.   Pulmonary/Chest: Effort normal. No respiratory distress. NC 3L  R and L meds chest tubes in place- minimal serosanguinous drainage.  Bandage over sternotomy incision   Abdominal: Soft. He exhibits no distension.   Skin: Skin is warm and dry.     Laboratory (Last 24H):  CBC:    Recent Labs  Lab 08/02/17  0310   WBC 9.84   HGB 9.5*   HCT 27.4*        CMP:    Recent Labs  Lab 08/02/17  0310 08/02/17  0933   CALCIUM 8.8 9.1   ALBUMIN 3.0*  --    PROT 6.7  --    * 133*   K 3.9 4.3   CO2 24 21*   CL 99 100   BUN 41* 41*   CREATININE 1.8* 1.8*   ALKPHOS 56  --    ALT 22  --    AST 24  --    BILITOT 2.3*  --        ASSESSMENT/PLAN:     Neuro:   - AAOx3  -off sedation  -continue current pain mgmt     Pulmonary:   -on 3L NC- sating well  -Nehemias at 5ppm  -Chest tubes - R med and 2 L meds. Minimal output     Cardiac:  -MAP range goal >65  -Dobutamine gtt 5  -Epi 0.03  -currently on cardene drip at 5    Renal:   -Singer in place  -Bun/Cr stable  31/1.1  -UOP >100cc/hr  -lasix gtt 20     Fluids/Electrolytes/Nutrition/GI:   -NGT for meds  -replace lytes PRN     Hematology/Oncology:  -on heparin gtt  -Aspirin 325mg     Infectious Disease:   -Afebrile   -WBC 13.9  -cefepime  -Cultures NGTD    Endocrine:  -endocrine following  -bg goal 140-180  -off insulin gtt     Dispo:  -Continue care in the ICU setting. Out of bed and in chair and PT    Avery Crystal  CA2  P: [phone]

## 2017-08-02 NOTE — PT/OT/SLP PROGRESS
"Speech Language Pathology  Treatment    Suman Hayden   MRN: [id number]   [id number]     Admitting Diagnosis: Acute on chronic combined systolic and diastolic heart failure    Diet recommendations:  Solid Diet Level: Dental Soft   Liquid Diet Level: Thin   ·  Feed only when awake/alert  · HOB to 90 degrees    SLP Treatment Date: 08/02/17  Speech Start Time: 1146     Speech Stop Time: 1154     Speech Total (min): 8 min       TREATMENT BILLABLE MINUTES:  Treatment Swallowing Dysfunction 8    Has the patient been evaluated by SLP for swallowing? : Yes  Keep patient NPO?: No   General Precautions: Standard, LVAD, fall, sternal  Current Respiratory Status: nasal cannula       Subjective:  "I just get full"   Pt's wife present at bedside    Pain/Comfort  Pain Rating 1: 0/10    Objective:   Patient found with: telemetry, pulse ox (continuous), blood pressure cuff, oxygen (LVAD)  Pt seen for ongoing assessment of diet tolerance. Pt continues to endorse reduced appetite. Pt assessed eating cookies and cream while sitting upright in bedside chair, and thin liquids via cyclical straw sips. Oral phase wfl with adequate mastication and formation of cohesive bolus. Pharyngeal phase c/b no overt s/s of aspiration.  Recommend pt remain on dental soft diet with thin liquids. No further skilled slp intervention warranted.     Assessment:  Suman Hayden is a 67 y.o. male with a medical diagnosis of Acute on chronic combined systolic and diastolic heart failure and presents with no overt s/s of aspiration.    Discharge recommendations: Discharge Facility/Level Of Care Needs: other (see comments) (no skilled slp intervention warranted upon d/c)     Goals:    SLP Goals     Not on file          Multidisciplinary Problems (Resolved)        Problem: SLP Goal    Goal Priority Disciplines Outcome   SLP Goal   (Resolved)     SLP Outcome(s) achieved   Description:  Speech Language Pathology Goals  Goals expected to be met by 8/4:  1. Pt will " tolerate a dental soft diet and thin liquids without s/s of aspiration.                          Plan:   Patient to be seen Therapy Frequency: 5 x/week   Plan of Care expires: 08/27/17  Plan of Care reviewed with: patient, spouse  SLP Follow-up?: No         SHERRI Hart, CCC-SLP, CLC  Speech Language Pathologist  Certified Lactation Counselor  [phone]  8/2/2017

## 2017-08-02 NOTE — PROGRESS NOTES
[facility]  Heart Transplant  Progress Note    Patient Name: Suman Hayden  MRN: [id number]  Admission Date: 7/18/2017  Hospital Length of Stay: 15 days  Attending Physician: Sunny Dowinng MD  Primary Care Provider: Joe Ernst MD  Principal Problem:Acute on chronic combined systolic and diastolic heart failure    Subjective:     Interval History: in chair this morning, some improvement in nausea     Continuous Infusions:   dextrose 5 % and 0.9 % NaCl 3 mL/hr at 08/01/17 0700    DOBUTamine 5 mcg/kg/min (08/02/17 1000)    epinephrine infusion (NON-TITRATING) 0.03 mcg/kg/min (08/02/17 1000)    furosemide (LASIX) 1 mg/mL infusion (non-titrating) 20 mg/hr (08/02/17 1000)    heparin (porcine) in 5 % dex 1,100 Units/hr (08/02/17 1000)    nicardipine Stopped (08/02/17 0700)    nitric oxide gas       Scheduled Meds:   amiodarone  400 mg Oral BID    amlodipine  10 mg Oral Daily    aspirin  325 mg Oral Daily    docusate sodium  200 mg Oral QHS    ferrous gluconate  324 mg Oral Daily with breakfast    magnesium oxide  400 mg Oral TID    pantoprazole  40 mg Oral Daily    polyethylene glycol  17 g Oral BID    potassium chloride  20 mEq Oral BID    pravastatin  20 mg Oral QHS    sodium chloride 0.9%  10 mL Intravenous Q6H    sodium chloride 0.9%  3 mL Intravenous Q8H    warfarin  6 mg Oral Daily     PRN Meds:albumin human 5%, albuterol sulfate, bisacodyl, fentaNYL, hydrALAZINE, magnesium hydroxide 400 mg/5 ml, magnesium sulfate IVPB, ondansetron, oxycodone, oxycodone, potassium chloride, potassium chloride, Flushing PICC Protocol **AND** sodium chloride 0.9% **AND** sodium chloride 0.9%, sodium phosphate IVPB, sodium phosphate IVPB, sodium phosphate IVPB    Review of patient's allergies indicates:  No Known Allergies  Objective:     Vital Signs (Most Recent):  Temp: 97.8 °F (36.6 °C) (08/02/17 0700)  Pulse: 95 (08/02/17 1000)  Resp: (!) 21 (08/02/17 1000)  BP: (!) 141/99 (08/02/17  0900)  SpO2: 100 % (08/02/17 0800) Vital Signs (24h Range):  Temp:  [97.8 °F (36.6 °C)-98 °F (36.7 °C)] 97.8 °F (36.6 °C)  Pulse:  [] 95  Resp:  [18-30] 21  SpO2:  [96 %-100 %] 100 %  BP: ()/(0-99) 141/99  Arterial Line BP: ()/(57-86) 93/70     Weight: 84.8 kg (186 lb 15.2 oz)  Body mass index is 28.43 kg/m².      Intake/Output Summary (Last 24 hours) at 08/02/17 1104  Last data filed at 08/02/17 1000   Gross per 24 hour   Intake             2905 ml   Output             2830 ml   Net               75 ml       Hemodynamic Parameters:           Physical Exam   Constitutional: He appears well-developed and well-nourished. No distress.   HENT:   Head: Normocephalic.   Eyes: Pupils are equal, round, and reactive to light.   Neck: Normal range of motion. No JVD present.   Cardiovascular: Normal rate.  Exam reveals no gallop and no friction rub.    No murmur heard.  Pulmonary/Chest: Effort normal. No respiratory distress. He has no wheezes. He has no rales.   Abdominal: Soft. He exhibits no distension. There is no tenderness. There is no guarding.   Musculoskeletal: Normal range of motion. He exhibits no edema.   Neurological: He is alert. No cranial nerve deficit. Coordination normal.   Skin: Skin is warm. He is not diaphoretic. No erythema.       Significant Labs:  CBC:    Recent Labs  Lab 08/02/17  0310   WBC 9.84   RBC 3.40*   HGB 9.5*   HCT 27.4*      MCV 81*   MCH 27.9   MCHC 34.7     BNP:    Recent Labs  Lab 08/02/17  0310   BNP 1,398*     CMP:    Recent Labs  Lab 08/02/17  0310 08/02/17  0933    125*   CALCIUM 8.8 9.1   ALBUMIN 3.0*  --    PROT 6.7  --    * 133*   K 3.9 4.3   CO2 24 21*   CL 99 100   BUN 41* 41*   CREATININE 1.8* 1.8*   ALKPHOS 56  --    ALT 22  --    AST 24  --    BILITOT 2.3*  --       Coagulation:     Recent Labs  Lab 08/02/17  0310 08/02/17  0933   INR 1.1  --    APTT 45.3* 39.0*     LDH:    Recent Labs  Lab 07/31/17  0400 08/01/17  0405 08/02/17 0310    * 345* 315*     Microbiology:  Microbiology Results (last 7 days)     Procedure Component Value Units Date/Time    Blood culture [[id number]] Collected:  07/24/17 1300    Order Status:  Completed Specimen:  Blood from Peripheral, Hand, Right Updated:  07/29/17 1812     Blood Culture, Routine No growth after 5 days.    Urine culture [[id number]]  (Susceptibility) Collected:  07/24/17 1139    Order Status:  Completed Specimen:  Urine from Urine, Clean Catch Updated:  07/29/17 1506     Urine Culture, Routine --     ENTEROCOCCUS FAECALIS  >100,000 cfu/ml  No other significant isolate      Blood culture [[id number]] Collected:  07/24/17 1100    Order Status:  Completed Specimen:  Blood from Peripheral, Antecubital, Left Updated:  07/29/17 1412     Blood Culture, Routine No growth after 5 days.          I have reviewed all pertinent labs within the past 24 hours.    Estimated Creatinine Clearance: 42.2 mL/min (based on Cr of 1.8).    Diagnostic Results:  I have reviewed and interpreted all pertinent imaging results/findings within the past 24 hours.    Assessment and Plan:     Atrial fibrillation    - c/w heparin gtt  - c/w amio gtt        LVAD (left ventricular assist device) present    - LVAD HM III  - Speed 5200  - no events  - LDH increased to 507. Monitor daily  - INR 1.1  - on heparin gtt        Urine culture positive    - afebrile, treated with IV cefipime         Atrial tachycardia    - [id number] - 3  - c/w amiodarone  - c/w heparin gtt        AICD discharge    - appropriate in the setting of VT aggravated by underlying AT/AFL (earlier during the admission)  - 7/28 - setting of AF undersense - device reprogrammed to VVI 80  - tachy therapy turned off  - on amio gtt  - EP twill do lead revision once INR theraputic         Hepatitis B core antibody positive since 2012    - will defer liver biopsy as CTS not requiring it  - ID consult cleared the patient for sx  - case discussed with hepatology, low suspicion  for liver involvement        V-tach    - s/p amiodarone reload - now on amio gtt        Hyperlipidemia    - c/w pravastatin        * Acute on chronic combined systolic and diastolic heart failure    - CTS Primary  - s/p LVAD HM III (7/27/17)  - chest closure (7/28/17)  - extubated (7/29/17)  - on , epi, lasix 20  - off cardene, vasopressin gtt  - NO decreased per CTS                MELISSA Long  Heart Transplant  [facility]

## 2017-08-02 NOTE — ASSESSMENT & PLAN NOTE
- CTS Primary  - s/p LVAD HM III (7/27/17)  - chest closure (7/28/17)  - extubated (7/29/17)  - on , epi, lasix 20  - off cardene, vasopressin gtt  - NO decreased per CTS

## 2017-08-02 NOTE — ASSESSMENT & PLAN NOTE
- appropriate in the setting of VT aggravated by underlying AT/AFL (earlier during the admission)  - 7/28 - setting of AF undersense - device reprogrammed to VVI 80  - tachy therapy turned off  - on amio gtt  - EP twill do lead revision once INR theraputic

## 2017-08-02 NOTE — PT/OT/SLP PROGRESS
Occupational Therapy  Treatment    Suman Hayden   MRN: [id number]   Admitting Diagnosis: Acute on chronic combined systolic and diastolic heart failure    OT Date of Treatment: 08/02/17   OT Start Time: 0800  OT Stop Time: 0847  OT Total Time (min): 47 min    Billable Minutes:  Self Care/Home Management 47    General Precautions: Standard, fall, sternal, LVAD    Subjective:  Communicated with RN prior to session, post session handoff discussed progress and plan of care     Objective:  Patient found with: blood pressure cuff, central line, arterial line, pulse ox (continuous), delgado catheter, chest tube, peripheral IV (LVAD, nitric oxide)     Functional Mobility:  Bed Mobility:   not assessed- pt up in chair pre and pose session     Transfers:   Sit <> Stand Assistance: Moderate Assistance  Bed <> Chair Technique: Stand Pivot  Bed <> Chair Transfer Assistance: Moderate Assistance    Functional Ambulation: Pt walked 16 feet, and 20 feet with Min Assist ( 2 person assist)    Activities of Daily Living:  Feeding Level of Assistance: Supervision, Set-up Assistance  Grooming Position: Standing at sink  Grooming Level of Assistance: Supervision    Balance:   Static Sit: GOOD-: Takes MODERATE challenges from all directions but inconsistently  Dynamic Sit: FAIR+: Maintains balance through MINIMAL excursions of active trunk motion  Static Stand: FAIR: Maintains without assist but unable to take challenges  Dynamic stand: Fair -    Therapeutic Activities and Exercises:  Pt educated on role of OT, plan of care, safety awareness, DME, importance of out of bed activity, LVAD management  Worked on switching LVAD from PBU power to battery, and putting battery in battery clips. Pt needs moderate cues to unscrew connectors     AM-PAC 6 CLICK ADL   How much help from another person does this patient currently need?   1 = Unable, Total/Dependent Assistance  2 = A lot, Maximum/Moderate Assistance  3 = A little, Minimum/Contact  "Guard/Supervision  4 = None, Modified [place]/Independent    Putting on and taking off regular lower body clothing? : 1  Bathing (including washing, rinsing, drying)?: 1  Toileting, which includes using toilet, bedpan, or urinal? : 1  Putting on and taking off regular upper body clothing?: 3  Taking care of personal grooming such as brushing teeth?: 3  Eating meals?: 3  Total Score: 12     AM-PAC Raw Score CMS "G-Code Modifier Level of Impairment Assistance   6 % Total / Unable   7 - 8 CM 80 - 100% Maximal Assist   9-13 CL 60 - 80% Moderate Assist   14 - 19 CK 40 - 60% Moderate Assist   20 - 22 CJ 20 - 40% Minimal Assist   23 CI 1-20% SBA / CGA   24 CH 0% Independent/ Mod I       Patient left up in chair with all lines intact, call button in reach, RN notified and spouse present    ASSESSMENT:  Suman Hayden is a 67 y.o. male with a medical diagnosis of Acute on chronic combined systolic and diastolic heart failure and presents with decreased activity tolerance and generalized weakness impacting indep and safety with ADL/ Self care and functional mobility. Pt able to progress with functional mobility this day as he was able to walk 6 feet to bedside sink and complete light ADL tasks. Pt tolerated OT treatment well. Pt will benefit from skilled OT services to maximize independence and safety with ADL/Self care and functional mobility     Rehab identified problem list/impairments: Rehab identified problem list/impairments: weakness, impaired endurance, impaired self care skills, impaired functional mobilty, impaired cardiopulmonary response to activity    Rehab potential is good.    Activity tolerance: Good    Discharge recommendations: Discharge Facility/Level Of Care Needs: home health PT     Barriers to discharge: Barriers to Discharge: None    Equipment recommendations:  (TBD closer to discharge)     GOALS:    Occupational Therapy Goals        Problem: Occupational Therapy Goal    Goal Priority " Disciplines Outcome Interventions   Occupational Therapy Goal     OT, PT/OT Ongoing (interventions implemented as appropriate)    Description:  Goals to be met by:  2 weeks 8/12/17     Patient will increase functional independence with ADLs by performing:  Feeding: Independent   UE Dressing with Supervision.  LE Dressing with Supervision.  Grooming while standing with Supervision.  Toileting from toilet with Supervision for hygiene and clothing management.   Stand pivot transfers with Supervision.  Toilet transfer to toilet with Supervision.  Pt will be independent with LVAD yasmin't.                 Multidisciplinary Problems (Resolved)        Problem: Occupational Therapy Goal    Goal Priority Disciplines Outcome Interventions   Occupational Therapy Goal   (Resolved)     OT, PT/OT  Error    Description:  Goals to be met by: 8/04/2017    Patient will increase functional independence with ADLs by performing:    Increased functional strength to 5/5 for improved ADL performance.  Upper extremity exercise program and R LE ankle pumps  3x 10 reps per handout, with independence.                      Plan:  Patient to be seen 6 x/week to address the above listed problems via self-care/home management, therapeutic activities, therapeutic exercises  Plan of Care expires: 08/04/17  Plan of Care reviewed with: patient, spouse         Beatriz Dubose, OT  08/02/2017

## 2017-08-02 NOTE — PLAN OF CARE
Problem: SLP Goal  Goal: SLP Goal  Speech Language Pathology Goals  Goals expected to be met by 8/4:  1. Pt will tolerate a dental soft diet and thin liquids without s/s of aspiration.        Outcome: Outcome(s) achieved Date Met: 08/02/17  SHERRI Lofton, CCC-SLP, [facility], 8/2/2017

## 2017-08-02 NOTE — ASSESSMENT & PLAN NOTE
-bridging to coumadin, INR still 1.1. Cannot perform while still on heparin due to bleeding risk so need to await therapeutic INR before any interventions.  -Patient needs RV lead replacement vs revision given persistent dysfunction of RV lead  -ICD is off but patient has pads to chest wall in case it is needed  -has frequent PVCs at his baseline and this continues  -Has LVAD (HMIII) in place at this time

## 2017-08-02 NOTE — PLAN OF CARE
Problem: Occupational Therapy Goal  Goal: Occupational Therapy Goal  Goals to be met by:  2 weeks 8/12/17     Patient will increase functional independence with ADLs by performing:  Feeding: Independent   UE Dressing with Supervision.  LE Dressing with Supervision.  Grooming while standing with Supervision.  Toileting from toilet with Supervision for hygiene and clothing management.   Stand pivot transfers with Supervision.  Toilet transfer to toilet with Supervision.  Pt will be independent with LVAD yasmin't.      Outcome: Ongoing (interventions implemented as appropriate)  Pt progressing with OT goals and outcomes. Continue OT plan of care

## 2017-08-03 LAB
ANION GAP SERPL CALC-SCNC: 11 MMOL/L
ANION GAP SERPL CALC-SCNC: 12 MMOL/L
ANION GAP SERPL CALC-SCNC: 12 MMOL/L
ANION GAP SERPL CALC-SCNC: 13 MMOL/L
ANION GAP SERPL CALC-SCNC: 13 MMOL/L
ANION GAP SERPL CALC-SCNC: 15 MMOL/L
APTT BLDCRRT: 26.1 SEC
APTT BLDCRRT: 31.7 SEC
APTT BLDCRRT: 51.7 SEC
APTT BLDCRRT: 55.6 SEC
BASOPHILS # BLD AUTO: 0.01 K/UL
BASOPHILS NFR BLD: 0.1 %
BUN SERPL-MCNC: 44 MG/DL
BUN SERPL-MCNC: 45 MG/DL
CALCIUM SERPL-MCNC: 8.7 MG/DL
CALCIUM SERPL-MCNC: 8.8 MG/DL
CALCIUM SERPL-MCNC: 9.3 MG/DL
CHLORIDE SERPL-SCNC: 100 MMOL/L
CHLORIDE SERPL-SCNC: 99 MMOL/L
CO2 SERPL-SCNC: 22 MMOL/L
CO2 SERPL-SCNC: 23 MMOL/L
CO2 SERPL-SCNC: 25 MMOL/L
CREAT SERPL-MCNC: 2 MG/DL
DIFFERENTIAL METHOD: ABNORMAL
EOSINOPHIL # BLD AUTO: 0.1 K/UL
EOSINOPHIL NFR BLD: 1.4 %
ERYTHROCYTE [DISTWIDTH] IN BLOOD BY AUTOMATED COUNT: 15.3 %
EST. GFR  (AFRICAN AMERICAN): 38.8 ML/MIN/1.73 M^2
EST. GFR  (NON AFRICAN AMERICAN): 33.5 ML/MIN/1.73 M^2
GLUCOSE SERPL-MCNC: 113 MG/DL
GLUCOSE SERPL-MCNC: 90 MG/DL
GLUCOSE SERPL-MCNC: 94 MG/DL
GLUCOSE SERPL-MCNC: 94 MG/DL
GLUCOSE SERPL-MCNC: 97 MG/DL
GLUCOSE SERPL-MCNC: 97 MG/DL
HCT VFR BLD AUTO: 27.3 %
HDLC SERPL-MCNC: 82 MG/DL
HGB BLD-MCNC: 9.3 G/DL
LDH SERPL L TO P-CCNC: 285 U/L
LYMPHOCYTES # BLD AUTO: 1.6 K/UL
LYMPHOCYTES NFR BLD: 15.8 %
MAGNESIUM SERPL-MCNC: 2.2 MG/DL
MAGNESIUM SERPL-MCNC: 2.3 MG/DL
MAGNESIUM SERPL-MCNC: 2.5 MG/DL
MCH RBC QN AUTO: 27.9 PG
MCHC RBC AUTO-ENTMCNC: 34.1 G/DL
MCV RBC AUTO: 82 FL
MONOCYTES # BLD AUTO: 0.9 K/UL
MONOCYTES NFR BLD: 8.9 %
NEUTROPHILS # BLD AUTO: 7.2 K/UL
NEUTROPHILS NFR BLD: 73.8 %
PHOSPHATE SERPL-MCNC: 3.1 MG/DL
PLATELET # BLD AUTO: 220 K/UL
PMV BLD AUTO: 10.9 FL
POTASSIUM SERPL-SCNC: 3.5 MMOL/L
POTASSIUM SERPL-SCNC: 3.5 MMOL/L
POTASSIUM SERPL-SCNC: 3.9 MMOL/L
POTASSIUM SERPL-SCNC: 3.9 MMOL/L
POTASSIUM SERPL-SCNC: 4 MMOL/L
POTASSIUM SERPL-SCNC: 4 MMOL/L
RBC # BLD AUTO: 3.33 M/UL
SODIUM SERPL-SCNC: 134 MMOL/L
SODIUM SERPL-SCNC: 134 MMOL/L
SODIUM SERPL-SCNC: 135 MMOL/L
SODIUM SERPL-SCNC: 135 MMOL/L
SODIUM SERPL-SCNC: 136 MMOL/L
SODIUM SERPL-SCNC: 136 MMOL/L
URATE SERPL-MCNC: 9.1 MG/DL
WBC # BLD AUTO: 9.84 K/UL

## 2017-08-03 PROCEDURE — 27000248 HC VAD-ADDITIONAL DAY

## 2017-08-03 PROCEDURE — 85025 COMPLETE CBC W/AUTO DIFF WBC: CPT

## 2017-08-03 PROCEDURE — 97535 SELF CARE MNGMENT TRAINING: CPT

## 2017-08-03 PROCEDURE — 94799 UNLISTED PULMONARY SVC/PX: CPT

## 2017-08-03 PROCEDURE — 25000003 PHARM REV CODE 250: Performed by: STUDENT IN AN ORGANIZED HEALTH CARE EDUCATION/TRAINING PROGRAM

## 2017-08-03 PROCEDURE — 84550 ASSAY OF BLOOD/URIC ACID: CPT

## 2017-08-03 PROCEDURE — 83615 LACTATE (LD) (LDH) ENZYME: CPT

## 2017-08-03 PROCEDURE — 99233 SBSQ HOSP IP/OBS HIGH 50: CPT | Mod: ,,, | Performed by: INTERNAL MEDICINE

## 2017-08-03 PROCEDURE — 80048 BASIC METABOLIC PNL TOTAL CA: CPT | Mod: 91

## 2017-08-03 PROCEDURE — 25000003 PHARM REV CODE 250: Performed by: THORACIC SURGERY (CARDIOTHORACIC VASCULAR SURGERY)

## 2017-08-03 PROCEDURE — 85730 THROMBOPLASTIN TIME PARTIAL: CPT

## 2017-08-03 PROCEDURE — 27000221 HC OXYGEN, UP TO 24 HOURS

## 2017-08-03 PROCEDURE — 94761 N-INVAS EAR/PLS OXIMETRY MLT: CPT

## 2017-08-03 PROCEDURE — 97530 THERAPEUTIC ACTIVITIES: CPT

## 2017-08-03 PROCEDURE — 99232 SBSQ HOSP IP/OBS MODERATE 35: CPT | Mod: ,,, | Performed by: INTERNAL MEDICINE

## 2017-08-03 PROCEDURE — A4216 STERILE WATER/SALINE, 10 ML: HCPCS | Performed by: STUDENT IN AN ORGANIZED HEALTH CARE EDUCATION/TRAINING PROGRAM

## 2017-08-03 PROCEDURE — 99233 SBSQ HOSP IP/OBS HIGH 50: CPT | Mod: 24,,, | Performed by: THORACIC SURGERY (CARDIOTHORACIC VASCULAR SURGERY)

## 2017-08-03 PROCEDURE — 93750 INTERROGATION VAD IN PERSON: CPT | Performed by: THORACIC SURGERY (CARDIOTHORACIC VASCULAR SURGERY)

## 2017-08-03 PROCEDURE — A4216 STERILE WATER/SALINE, 10 ML: HCPCS | Performed by: THORACIC SURGERY (CARDIOTHORACIC VASCULAR SURGERY)

## 2017-08-03 PROCEDURE — 20000000 HC ICU ROOM

## 2017-08-03 PROCEDURE — 63600175 PHARM REV CODE 636 W HCPCS: Performed by: STUDENT IN AN ORGANIZED HEALTH CARE EDUCATION/TRAINING PROGRAM

## 2017-08-03 PROCEDURE — 99233 SBSQ HOSP IP/OBS HIGH 50: CPT | Mod: GC,,, | Performed by: ANESTHESIOLOGY

## 2017-08-03 PROCEDURE — 84100 ASSAY OF PHOSPHORUS: CPT

## 2017-08-03 PROCEDURE — 83735 ASSAY OF MAGNESIUM: CPT | Mod: 91

## 2017-08-03 PROCEDURE — 93750 INTERROGATION VAD IN PERSON: CPT | Mod: ,,, | Performed by: THORACIC SURGERY (CARDIOTHORACIC VASCULAR SURGERY)

## 2017-08-03 PROCEDURE — 82465 ASSAY BLD/SERUM CHOLESTEROL: CPT

## 2017-08-03 PROCEDURE — 93750 INTERROGATION VAD IN PERSON: CPT | Mod: ,,, | Performed by: INTERNAL MEDICINE

## 2017-08-03 PROCEDURE — 83735 ASSAY OF MAGNESIUM: CPT

## 2017-08-03 PROCEDURE — 80048 BASIC METABOLIC PNL TOTAL CA: CPT

## 2017-08-03 PROCEDURE — 97116 GAIT TRAINING THERAPY: CPT

## 2017-08-03 PROCEDURE — 84134 ASSAY OF PREALBUMIN: CPT

## 2017-08-03 RX ORDER — POTASSIUM CHLORIDE 29.8 MG/ML
40 INJECTION INTRAVENOUS ONCE
Status: COMPLETED | OUTPATIENT
Start: 2017-08-03 | End: 2017-08-03

## 2017-08-03 RX ORDER — DEXTROSE MONOHYDRATE AND SODIUM CHLORIDE 5; .9 G/100ML; G/100ML
INJECTION, SOLUTION INTRAVENOUS CONTINUOUS
Status: DISCONTINUED | OUTPATIENT
Start: 2017-08-03 | End: 2017-08-09

## 2017-08-03 RX ORDER — AMLODIPINE BESYLATE 2.5 MG/1
2.5 TABLET ORAL DAILY
Status: DISCONTINUED | OUTPATIENT
Start: 2017-08-04 | End: 2017-08-03

## 2017-08-03 RX ORDER — AMLODIPINE BESYLATE 5 MG/1
5 TABLET ORAL DAILY
Status: DISCONTINUED | OUTPATIENT
Start: 2017-08-04 | End: 2017-08-09

## 2017-08-03 RX ORDER — WARFARIN 2 MG/1
4 TABLET ORAL DAILY
Status: COMPLETED | OUTPATIENT
Start: 2017-08-03 | End: 2017-08-03

## 2017-08-03 RX ADMIN — Medication 3 ML: at 09:08

## 2017-08-03 RX ADMIN — Medication 10 ML: at 11:08

## 2017-08-03 RX ADMIN — Medication 10 ML: at 12:08

## 2017-08-03 RX ADMIN — HEPARIN SODIUM AND DEXTROSE 1200 UNITS/HR: 10000; 5 INJECTION INTRAVENOUS at 07:08

## 2017-08-03 RX ADMIN — Medication 3 ML: at 05:08

## 2017-08-03 RX ADMIN — DOCUSATE SODIUM 200 MG: 100 CAPSULE, LIQUID FILLED ORAL at 09:08

## 2017-08-03 RX ADMIN — PRAVASTATIN SODIUM 20 MG: 20 TABLET ORAL at 09:08

## 2017-08-03 RX ADMIN — HYDRALAZINE HYDROCHLORIDE 10 MG: 20 INJECTION INTRAMUSCULAR; INTRAVENOUS at 10:08

## 2017-08-03 RX ADMIN — AMLODIPINE BESYLATE 10 MG: 10 TABLET ORAL at 09:08

## 2017-08-03 RX ADMIN — POTASSIUM CHLORIDE 40 MEQ: 400 INJECTION, SOLUTION INTRAVENOUS at 07:08

## 2017-08-03 RX ADMIN — POLYETHYLENE GLYCOL 3350 17 G: 17 POWDER, FOR SOLUTION ORAL at 09:08

## 2017-08-03 RX ADMIN — WARFARIN SODIUM 4 MG: 2 TABLET ORAL at 05:08

## 2017-08-03 RX ADMIN — AMIODARONE HYDROCHLORIDE 400 MG: 200 TABLET ORAL at 09:08

## 2017-08-03 RX ADMIN — EPINEPHRINE 0.03 MCG/KG/MIN: 1 INJECTION PARENTERAL at 09:08

## 2017-08-03 RX ADMIN — ASPIRIN 325 MG: 325 TABLET, DELAYED RELEASE ORAL at 09:08

## 2017-08-03 RX ADMIN — ONDANSETRON 4 MG: 2 INJECTION INTRAMUSCULAR; INTRAVENOUS at 11:08

## 2017-08-03 RX ADMIN — DEXTROSE AND SODIUM CHLORIDE: 5; .9 INJECTION, SOLUTION INTRAVENOUS at 11:08

## 2017-08-03 RX ADMIN — PANTOPRAZOLE SODIUM 40 MG: 40 TABLET, DELAYED RELEASE ORAL at 09:08

## 2017-08-03 RX ADMIN — DOBUTAMINE HYDROCHLORIDE 5 MCG/KG/MIN: 400 INJECTION INTRAVENOUS at 05:08

## 2017-08-03 RX ADMIN — POTASSIUM CHLORIDE 20 MEQ: 750 CAPSULE, EXTENDED RELEASE ORAL at 09:08

## 2017-08-03 RX ADMIN — Medication 10 ML: at 06:08

## 2017-08-03 NOTE — PROGRESS NOTES
Dr. NORA Rao notified of pts MAP >80, sustaining. Dr. YOVANY Downing notified per Dr. Rao. Cardene gtt to be restarted per MD order. Plan of care reviewed with pt and family. See flowsheet for details.

## 2017-08-03 NOTE — PROGRESS NOTES
Progress Note  Surgical Intensive Care    Admit Date: 7/18/2017  Post-operative Day: 6 Days Post-Op  Hospital Day: 17    SUBJECTIVE:     Follow-up For:  Procedure(s) (LRB):  CLOSURE-STERNAL WOUND (N/A)  PLACEMENT-NEOPERICARDIUM (N/A)   S/p sternotomy closure 7/18/17    Interval history: Pt. Reports persistence of N/V overnight. Downtitration of epi, remians on dobutamine, cardene. Nitric has been discontinued. Lasix and amio gtt stable. Pt. Out of bed to chair, working with PT.     Continuous Infusions:   dextrose 5 % and 0.9 % NaCl Stopped (08/03/17 0800)    dextrose 5 % and 0.9 % NaCl 50 mL/hr at 08/03/17 1110    DOBUTamine 5 mcg/kg/min (08/03/17 1110)    epinephrine infusion (NON-TITRATING) 0.02 mcg/kg/min (08/03/17 1110)    furosemide (LASIX) 1 mg/mL infusion (non-titrating) 20 mg/hr (08/03/17 1110)    heparin (porcine) in 5 % dex 1,200 Units/hr (08/03/17 1110)    nicardipine Stopped (08/02/17 2244)     Scheduled Meds:   amiodarone  400 mg Oral BID    [START ON 8/4/2017] amlodipine  2.5 mg Oral Daily    aspirin  325 mg Oral Daily    docusate sodium  200 mg Oral QHS    ferrous gluconate  324 mg Oral Daily with breakfast    magnesium oxide  400 mg Oral TID    pantoprazole  40 mg Oral Daily    polyethylene glycol  17 g Oral BID    potassium chloride  20 mEq Oral BID    pravastatin  20 mg Oral QHS    sodium chloride 0.9%  10 mL Intravenous Q6H    sodium chloride 0.9%  3 mL Intravenous Q8H    warfarin  4 mg Oral Daily     PRN Meds:albumin human 5%, albuterol sulfate, bisacodyl, fentaNYL, hydrALAZINE, magnesium hydroxide 400 mg/5 ml, magnesium sulfate IVPB, ondansetron, oxycodone, oxycodone, potassium chloride, potassium chloride, Flushing PICC Protocol **AND** sodium chloride 0.9% **AND** sodium chloride 0.9%, sodium phosphate IVPB, sodium phosphate IVPB, sodium phosphate IVPB    Review of patient's allergies indicates:  No Known Allergies    OBJECTIVE:     Vital Signs (Most Recent)  Temp: 98.1 °F  (36.7 °C) (08/03/17 1200)  Pulse: 101 (08/03/17 1145)  Resp: 18 (08/03/17 1145)  BP: (!) 80/0 (08/03/17 1200)  SpO2: 97 % (08/03/17 1100)    Vital Signs Range (Last 24H):  Temp:  [97.9 °F (36.6 °C)-98.9 °F (37.2 °C)]   Pulse:  []   Resp:  [17-41]   BP: (80-91)/(0)   SpO2:  [94 %-100 %]   Arterial Line BP: ()/(49-79)     I & O (Last 24H):    Intake/Output Summary (Last 24 hours) at 08/03/17 1211  Last data filed at 08/03/17 1110   Gross per 24 hour   Intake           1690.5 ml   Output             2460 ml   Net           -769.5 ml     Ventilator Data (Last 24H):     Oxygen Concentration (%):  [24] 24    Hemodynamic Parameters (Last 24H):       Physical Exam   Constitutional: He appears well-developed and well-nourished. No distress. Resting comfortably   HENT:   Head: Normocephalic and atraumatic.   Eyes: Right eye exhibits no discharge. Left eye exhibits no discharge. No scleral icterus.   Neck: Normal range of motion. Neck supple.   Cardiovascular: Intact distal pulses.    LVAD hum present.   Pulmonary/Chest: Effort normal. No respiratory distress. NC 3L  R and L meds chest tubes in place- minimal serosanguinous drainage.  Bandage over sternotomy incision   Abdominal: Soft. He exhibits no distension.   Skin: Skin is warm and dry.     Laboratory (Last 24H):  CBC:    Recent Labs  Lab 08/03/17  0400   WBC 9.84   HGB 9.3*   HCT 27.3*        CMP:    Recent Labs  Lab 08/03/17  0954   CALCIUM 8.8  8.8   *  135*   K 3.9  3.9   CO2 23  23   CL 99  99   BUN 45*  45*   CREATININE 2.0*  2.0*       ASSESSMENT/PLAN:     Neuro:   - AAOx3  -off sedation  -continue current pain mgmt     Pulmonary:   -on 3L NC- sating well  -Nehemias d/c 8/2/17  -Chest tubes - R med and 2 L meds. Minimal output     Cardiac:  -MAP range goal >65  -Dobutamine gtt 5  -Epi 0.03  -currently on cardene drip at 5    Renal:   -Singer in place  -Bun/Cr stable 31/1.1  -UOP >100cc/hr  -lasix gtt  20     Fluids/Electrolytes/Nutrition/GI:   -NGT for meds  -replace lytes PRN     Hematology/Oncology:  -on heparin gtt  -Aspirin 325mg     Infectious Disease:   -Afebrile   -WBC 13.9  -cefepime  -Cultures NGTD    Endocrine:  -endocrine following  -bg goal 140-180  -off insulin gtt     Dispo:  -Continue care in the ICU setting. Out of bed and in chair and PT    Avery Crystal  CA2  P: [phone]

## 2017-08-03 NOTE — PROGRESS NOTES
Daily E and M and VAD Interrogation Note    Reason for Visit:  Patient is seen in follow up for management of:  [] HeartMate II  [] Heartware [] Total artificial heart       [] ECMO           [x] Other - HM III     Interval History:  [] Interval history unobtainable due to intubation.  The [x] implant/[] explant date was 7/27/17    Events - No acute events o/n. Urine output between 100-150/hr - lasix gtt at 20 and pt received dose of diuril. Epi at 0.03, dobutamine remains at 5. Clinically, up in chair this AM. Sill no BM after miralax and Dulcolax. Continued nausea.      Review of Systems:   Negative except as above.      Medications:  Current Facility-Administered Medications   Medication Dose Route Frequency Provider Last Rate Last Dose    albumin human 5% bottle 500 mL  500 mL Intravenous PRN Shayne Espinoza MD   500 mL at 07/28/17 1755    albuterol nebulizer solution 2.5 mg  2.5 mg Nebulization Q4H PRN Shayne Espinoza MD        amiodarone tablet 400 mg  400 mg Oral BID Shayne Espinoza MD   400 mg at 08/03/17 0953    [START ON 8/4/2017] amlodipine tablet 2.5 mg  2.5 mg Oral Daily Shayne Espinoza MD        aspirin EC tablet 325 mg  325 mg Oral Daily Shayne Espinoza MD   325 mg at 08/03/17 0953    bisacodyl suppository 10 mg  10 mg Rectal Daily PRN Shayne Espinoza MD   10 mg at 08/02/17 1200    dextrose 5 % and 0.9 % NaCl infusion   Intravenous Continuous Shayne Espinoza MD   Stopped at 08/03/17 0800    dextrose 5 % and 0.9 % NaCl infusion   Intravenous Continuous Shayne Espinoza MD 50 mL/hr at 08/03/17 1110      DOBUtamine 1000 mg in D5W 250 mL infusion (premix non-titrating)  5 mcg/kg/min (Dosing Weight) Intravenous Continuous Shayne Espinoza MD 6 mL/hr at 08/03/17 1110 5 mcg/kg/min at 08/03/17 1110    docusate sodium capsule 200 mg  200 mg Oral QHS Shayne Espinoza MD   200 mg at 08/02/17 2010    EPINEPHrine (ADRENALIN) 4 mg in sodium chloride 0.9% 250 mL  infusion  0.02 mcg/kg/min (Dosing Weight) Intravenous Continuous Shayne Espinoza MD 6 mL/hr at 08/03/17 1110 0.02 mcg/kg/min at 08/03/17 1110    fentaNYL injection 50 mcg  50 mcg Intravenous Q4H PRN Shayne Espinoza MD   50 mcg at 07/29/17 0827    ferrous gluconate tablet 324 mg  324 mg Oral Daily with breakfast Shayne Espinoza MD   Stopped at 08/03/17 0745    furosemide (LASIX) 250 mg in sodium chloride 0.9 % 250 mL infusion (non-titrating)  20 mg/hr Intravenous Continuous Edwige Clay MD 20 mL/hr at 08/03/17 1110 20 mg/hr at 08/03/17 1110    heparin 25,000 units in dextrose 5% 250 mL (100 units/mL) infusion (heparin infusion)  1,200 Units/hr Intravenous Continuous Shayne Espinoza MD 12 mL/hr at 08/03/17 1110 1,200 Units/hr at 08/03/17 1110    hydrALAZINE injection 10 mg  10 mg Intravenous Q6H PRN Shayne Espinoza MD   10 mg at 08/02/17 2244    magnesium hydroxide 400 mg/5 ml suspension 2,400 mg  30 mL Oral Daily PRN Shayne Espinoza MD        magnesium oxide tablet 400 mg  400 mg Oral TID Shayne Espinoza MD   Stopped at 08/02/17 0600    magnesium sulfate 2g in water 50mL IVPB (premix)  2 g Intravenous PRN Shayne Espinoza MD   2 g at 07/30/17 0731    niCARdipine 40 mg/200 mL infusion  1 mg/hr Intravenous Continuous Shayne Espinoza MD   Stopped at 08/02/17 2244    ondansetron injection 4 mg  4 mg Intravenous Q6H PRN Shayne Espinoza MD   4 mg at 08/02/17 2010    oxycodone immediate release tablet 10 mg  10 mg Oral Q4H PRN Shayne Espinoza MD        oxycodone immediate release tablet 5 mg  5 mg Oral Q4H PRN Shayne Espinoza MD   5 mg at 07/30/17 0817    pantoprazole EC tablet 40 mg  40 mg Oral Daily Shayne Espinoza MD   40 mg at 08/03/17 0953    polyethylene glycol packet 17 g  17 g Oral BID Shayne Espinoza MD   17 g at 08/03/17 0953    potassium chloride 20 mEq in 100 mL IVPB (FOR CENTRAL LINE ADMINISTRATION ONLY)  40 mEq Intravenous PRN Shanye  Howard Espinoza MD 50 mL/hr at 08/02/17 0419 40 mEq at 08/02/17 0419    potassium chloride 20 mEq in 100 mL IVPB (FOR CENTRAL LINE ADMINISTRATION ONLY)  40 mEq Intravenous PRN Shayne Espinoza MD        potassium chloride CR capsule 20 mEq  20 mEq Oral BID Shayne Espinoza MD   20 mEq at 08/03/17 0953    pravastatin tablet 20 mg  20 mg Oral QHS Lorena Payton MD   20 mg at 08/02/17 2010    sodium chloride 0.9% flush 10 mL  10 mL Intravenous Q6H Sunny Downing MD   10 mL at 08/03/17 1200    And    sodium chloride 0.9% flush 10 mL  10 mL Intravenous PRN Sunny Downing MD        sodium chloride 0.9% flush 3 mL  3 mL Intravenous Q8H Shayne Espinoza MD   3 mL at 08/03/17 0550    sodium phosphate 15 mmol in dextrose 5 % 250 mL IVPB  15 mmol Intravenous PRN Edwige Clay MD 83.3 mL/hr at 07/29/17 2240 15 mmol at 07/29/17 2240    sodium phosphate 20.01 mmol in dextrose 5 % 250 mL IVPB  20.01 mmol Intravenous PRN Edwige Clay MD        sodium phosphate 30 mmol in dextrose 5 % 250 mL IVPB  30 mmol Intravenous PRN Edwige Clay MD        warfarin (COUMADIN) tablet 4 mg  4 mg Oral Daily Shayne Espinoza MD           Physical Examination:  Vital Signs:   Vitals:    08/03/17 1200   BP: (!) 80/0   Pulse:    Resp:    Temp: 98.1 °F (36.7 °C)     Cardiovascular:  [x] Regular rate and rhythm [] Irregular []  None (MATT) []  Other  []  No edema [x]  Edema present  [x]  Clear to auscultation  []  Rales to []  Coarse  []  No rales but   [] Pedal Pulses absent  [x]  Pulses  + throughout  Skin:  Incision is [x]  Clean, dry and intact.  []  Other   Sternum:  [x]  Stable []  Unstable  Driveline(s):   [x]  Clean, dry and intact. []  Other       Labs:  [facility]  Lab Results   Component Value Date     (L) 08/03/2017     (L) 08/03/2017    K 3.9 08/03/2017    K 3.9 08/03/2017    CL 99 08/03/2017    CL 99 08/03/2017    CO2 23 08/03/2017    CO2 23 08/03/2017    BUN 45 (H) 08/03/2017    BUN 45 (H)  08/03/2017    CREATININE 2.0 (H) 08/03/2017    CREATININE 2.0 (H) 08/03/2017    CALCIUM 8.8 08/03/2017    CALCIUM 8.8 08/03/2017    ANIONGAP 13 08/03/2017    ANIONGAP 13 08/03/2017    ESTGFRAFRICA 38.8 (A) 08/03/2017    ESTGFRAFRICA 38.8 (A) 08/03/2017    EGFRNONAA 33.5 (A) 08/03/2017    EGFRNONAA 33.5 (A) 08/03/2017       Magnesium   Date Value Ref Range Status   08/03/2017 2.3 1.6 - 2.6 mg/dL Final   08/03/2017 2.3 1.6 - 2.6 mg/dL Final       Lab Results   Component Value Date    WBC 9.84 08/03/2017    HGB 9.3 (L) 08/03/2017    HCT 27.3 (L) 08/03/2017    MCV 82 08/03/2017     08/03/2017       Lab Results   Component Value Date    INR 1.1 08/02/2017    INR 1.1 08/01/2017    INR 1.1 07/31/2017       BNP   Date Value Ref Range Status   08/02/2017 1,398 (H) 0 - 99 pg/mL Final     Comment:     Values of less than 100 pg/ml are consistent with non-CHF populations.   07/31/2017 1,389 (H) 0 - 99 pg/mL Final     Comment:     Values of less than 100 pg/ml are consistent with non-CHF populations.   07/28/2017 1,184 (H) 0 - 99 pg/mL Final     Comment:     Values of less than 100 pg/ml are consistent with non-CHF populations.       LD   Date Value Ref Range Status   08/03/2017 285 (H) 110 - 260 U/L Final     Comment:     Results are increased in hemolyzed samples.   08/02/2017 315 (H) 110 - 260 U/L Final     Comment:     Results are increased in hemolyzed samples.   08/01/2017 345 (H) 110 - 260 U/L Final     Comment:     Results are increased in hemolyzed samples.       X-Rays:  [x]  I reviewed today's Chest x-ray    Procedure:  Device Interrogation including analysis of device parameters.  Current Settings   [x]  Ventricular Assist Device  []  Total Artificial Heart interrogated  Review of device function is [x]  Stable []  Other   TXP LVAD INTERROGATIONS 8/3/2017 8/3/2017 8/3/2017 8/3/2017 8/3/2017 8/3/2017 8/3/2017   Type - - - - HeartMate3 HeartMate3 HeartMate3   Flow 3.9 3.7 3.8 3.9 4.1 4.1 4.3   Speed 5200 5206  5200 5200 5200 5250 5250   PI 4.9 5.5 5.4 4.7 3.5 3.3 3.3   Power ([place]) 3.5 3.6 3.5 3.7 3.6 3.6 3.6   LSL 4800 4800 4800 4800 4800 4800 4800   Pulsatility - - - - - Pulse Pulse       Assessment:  [x]  Primary Cardiomyopathy []  Congestive Heart Failure   []  Atrial Fibrillation []  Ventricular Tachycardia   []  Aftercare cardiac device []  Long term (current) use of anticoagulants   []  Ventilator-associated pneumonia []  Pneumonia viral, unspecified   []  Pneumonia, bacterial, unspecified []  Pneumonia, organism unspecified   []  Hemorrhage of GI tract, unspecified    []  Nosebleed []  Driveline infection   []  Infection VAD device []  New onset of    []        Plan:  [x]  Interval history obtained from ICU attending team member during rounding today  []  VAD/MATT teaching performed with patient  []  Mobilization / Physical Therapy ongoing  []  Anticoagulation []  Ongoing []  Held  []  Studies ordered  [x]   To OR today for closure.       Total time spent was 30 minutes.  Of which more than 50 percent of the care dominated counseling and coordinating care with different team members. The VAD was interrogated and all parameters were WNL and no significant findings were found in the history. All these findings are documented in the note above.    Neuro:  - Continue current pain control regimen    Resp:  - ExtubatedOxygen Concentration (%):  [24] 24  - Minimize supplemental O2 requirements  - Pulm toilet  - Nehemias off  - wean off O2    CV:  - HDS; LVAD numbers stable o/n  -  from 315  - Amio 400 mg BID.   - Epi to 0.03 - will wean slowly  - Dobutamine to 5  - Normotensive    - Decrease Norvasc to 2.5 mg   - PRN hydralazine   -     Heme:  - Hgb/Hct stable  - Continue to trend  - Coumadin tonight - 4 mg given PO  - Heparin gtt     ID:  - Tmax 98.9  - WBC down to 9.8  - continue to monitor       Renal:  - Singer in place  - Strict I/Os   - Adequate UOP since increasing lasix and dose of diuril - 100-150 cc/hr  o/n.  - Lasix gtt at 20 mg/hr  - Hold diuril   - Cr trending up - 2.0 from 1.8    FEN/GI:  - NPO - liquids for comfort  - IVF  - Miralax BID  - Replace lytes PRN    Endo:  - Endocrine following, appreciate assistance  - Accuchecks  - Insulin gtt    Dispo:  - Continue ICU care      Shayne Espinoza MD  General Surgery PGY-2  Pager: [phone]      Cardiac Surgery Attending E and M (VAD) Note along with VAD Interrogation    I have seen and examined the patient and agree with the findings above    I also reviewed the patients clinical course and:  [x]  Hemodynamic & Respiratory parameters  [x]  Laboratory Data  [x]  Radiological studies     VAD Interrogated [x]      VAD Function is normal. Changes made []  none [x]      Interrogation of Ventricular assist device was performed with physician analysis of device parameters and review of device function. I have personally reviewed the interrogation findings and agree with findings as stated.

## 2017-08-03 NOTE — PT/OT/SLP PROGRESS
Occupational Therapy  Treatment    Suman Hayden   MRN: [id number]   Admitting Diagnosis: Acute on chronic combined systolic and diastolic heart failure    OT Date of Treatment: 08/03/17   OT Start Time: 0850  OT Stop Time: 0936  OT Total Time (min): 46 min    Billable Minutes:  Self Care/Home Management 15 and Therapeutic Activity 23    General Precautions: Standard, sternal, LVAD, fall  Orthopedic Precautions:    Braces:           Subjective:  Communicated with RN prior to session.  No complaints    Pain/Comfort  Pain Rating 1: 0/10  Pain Rating Post-Intervention 1: 0/10    Objective:  Patient found with: blood pressure cuff, central line, chest tube, arterial line, telemetry, pulse ox (continuous) (LVAD)     Functional Mobility:  Bed Mobility:  Rolling/Turning to Left:  (Pt UIC upon OT arrival)    Transfers:   Sit <> Stand Assistance: Moderate Assistance (from b/s chair)  Sit <> Stand Assistive Device: No Assistive Device    Functional Ambulation: Minimal A using [place] IV pole    Activities of Daily Living:  Feeding Level of Assistance: Set-up Assistance  Feeding adaptive equipment:   UE Dressing Level of Assistance: Maximum assistance (donning gown and consolidation bag)  UE adaptive equipment:   LE Dressing Level of Assistance: Maximum assistance  LE adaptive equipment:   Grooming Position: Standing  Grooming Level of Assistance: Stand by assistance                  Therapeutic Activities and Exercises:  Pt and wife participated in VAD education including Self Test and reason for test; pairing and battery rotation; DLES safety and need for securement of controller prior to transitions; emergency bag contents; wall<>battery power and set-up of consolidation bag; pt switched to/from battery with mod cueing and minimal A; pt required max A for set-up of consolidation bag    AM-PAC 6 CLICK ADL   How much help from another person does this patient currently need?   1 = Unable, Total/Dependent Assistance  2 = A lot,  "Maximum/Moderate Assistance  3 = A little, Minimum/Contact Guard/Supervision  4 = None, Modified [place]/Independent    Putting on and taking off regular lower body clothing? : 2  Bathing (including washing, rinsing, drying)?: 1  Toileting, which includes using toilet, bedpan, or urinal? : 2  Putting on and taking off regular upper body clothing?: 2  Taking care of personal grooming such as brushing teeth?: 3  Eating meals?: 3  Total Score: 13     AM-PAC Raw Score CMS "G-Code Modifier Level of Impairment Assistance   6 % Total / Unable   7 - 8 CM 80 - 100% Maximal Assist   9-13 CL 60 - 80% Moderate Assist   14 - 19 CK 40 - 60% Moderate Assist   20 - 22 CJ 20 - 40% Minimal Assist   23 CI 1-20% SBA / CGA   24 CH 0% Independent/ Mod I       Patient left up in chair with all lines intact, call button in reach, RN notified and spouse present    ASSESSMENT:  Suman Hayden is a 67 y.o. male with a medical diagnosis of Acute on chronic combined systolic and diastolic heart failure and presents with weakness and decreased endurance affecting ADL (I) s/p LVAD.    Rehab identified problem list/impairments: Rehab identified problem list/impairments: weakness, impaired endurance, impaired self care skills, impaired functional mobilty, gait instability, impaired balance, decreased upper extremity function, impaired cardiopulmonary response to activity    Rehab potential is good.    Activity tolerance: Good    Discharge recommendations: Discharge Facility/Level Of Care Needs: home with home health     Barriers to discharge: Barriers to Discharge: None    Equipment recommendations: none     GOALS:    Occupational Therapy Goals        Problem: Occupational Therapy Goal    Goal Priority Disciplines Outcome Interventions   Occupational Therapy Goal     OT, PT/OT Ongoing (interventions implemented as appropriate)    Description:  Goals to be met by:  2 weeks 8/12/17     Patient will increase functional independence with ADLs " by performing:  Feeding: Independent   UE Dressing with Supervision.  LE Dressing with Supervision.  Grooming while standing with Supervision.  Toileting from toilet with Supervision for hygiene and clothing management.   Stand pivot transfers with Supervision.  Toilet transfer to toilet with Supervision.  Pt will be independent with LVAD yasmin't.                 Multidisciplinary Problems (Resolved)        Problem: Occupational Therapy Goal    Goal Priority Disciplines Outcome Interventions   Occupational Therapy Goal   (Resolved)     OT, PT/OT  Error    Description:  Goals to be met by: 8/04/2017    Patient will increase functional independence with ADLs by performing:    Increased functional strength to 5/5 for improved ADL performance.  Upper extremity exercise program and R LE ankle pumps  3x 10 reps per handout, with independence.                      Plan:  Patient to be seen 6 x/week to address the above listed problems via self-care/home management, therapeutic exercises, therapeutic activities  Plan of Care expires: 08/04/17  Plan of Care reviewed with: patient, spouse         Sammi CASTILLO DELMY Conklin  08/03/2017

## 2017-08-03 NOTE — PROCEDURES
TXP LVAD INTERROGATIONS 8/3/2017 8/3/2017 8/3/2017 8/3/2017 8/3/2017 8/3/2017 8/3/2017   Type - HeartMate3 HeartMate3 HeartMate3 HeartMate3 HeartMate3 HeartMate3   Flow 3.9 4.1 4.1 4.3 4.3 4.1 4.4   Speed 5200 5200 5250 5250 5200 5200 5200   PI 4.7 3.5 3.3 3.3 3.2 3.5 3.2   Power ([place]) 3.7 3.6 3.6 3.6 3.7 3.7 3.5   LSL 4800 4800 4800 4800 4800 4800 4800   Pulsatility - - Pulse Pulse Pulse Pulse Pulse     Pt and wife AAAO.  Spent approx 40 minutes at bedside providing written and verbal education.  Assisted wife to document VAD numbers in binder.  She is working on workbook.  Many questions answered to her satisfaction as evidence by verbal acknowledgment.  Unable to assess modular cable again today due to position in chair.      HCT=27.3  No alarms noted overnight  VAD sounds HM3  Pulsatile  Waveforms recorded [id number].  Monitor 2987 not recognizing card in slot.  Silverio called to change monitor out and will look into problem.     PT/OT at bedside to walk pt.  Also providing verbal and demonstrating VAD education with pt and wife.

## 2017-08-03 NOTE — PLAN OF CARE
Problem: Occupational Therapy Goal  Goal: Occupational Therapy Goal  Goals to be met by:  2 weeks 8/12/17     Patient will increase functional independence with ADLs by performing:  Feeding: Independent   UE Dressing with Supervision.  LE Dressing with Supervision.  Grooming while standing with Supervision.  Toileting from toilet with Supervision for hygiene and clothing management.   Stand pivot transfers with Supervision.  Toilet transfer to toilet with Supervision.  Pt will be independent with LVAD yasmin't.      Outcome: Ongoing (interventions implemented as appropriate)  The above goals remain appropriate. DELMY Lucero  8/3/2017

## 2017-08-03 NOTE — PROGRESS NOTES
[facility]  Cardiac Electrophysiology  Progress Note    Admission Date: 7/18/2017  Code Status: Prior   Attending Physician: Sunny Downing MD   Expected Discharge Date: 8/16/2017  Principal Problem:Acute on chronic combined systolic and diastolic heart failure    Subjective:     Interval History: awaiting therapeutic INR    Review of Systems   Constitution: Negative.   HENT: Negative.    Eyes: Negative.    Cardiovascular: Negative.    Respiratory: Negative.    Endocrine: Negative.    Skin: Negative.    Musculoskeletal: Negative.    Gastrointestinal: Negative.    Genitourinary: Negative.    Neurological: Negative.      Objective:     Vital Signs (Most Recent):  Temp: 98.9 °F (37.2 °C) (08/02/17 2300)  Pulse: 96 (08/03/17 0800)  Resp: 20 (08/03/17 0800)  BP: (!) 89/0 (08/03/17 0500)  SpO2: 95 % (08/03/17 0730) Vital Signs (24h Range):  Temp:  [97.9 °F (36.6 °C)-98.9 °F (37.2 °C)] 98.9 °F (37.2 °C)  Pulse:  [] 96  Resp:  [17-41] 20  SpO2:  [94 %-100 %] 95 %  BP: ()/(0-99) 89/0  Arterial Line BP: ()/(49-79) 81/62     Weight: 81.2 kg (179 lb 0.2 oz)  Body mass index is 27.22 kg/m².     SpO2: 95 %  O2 Device (Oxygen Therapy): nasal cannula    Physical Exam   Constitutional: He is oriented to person, place, and time. He appears well-developed and well-nourished.   HENT:   Head: Normocephalic and atraumatic.   Eyes: Conjunctivae and EOM are normal. Pupils are equal, round, and reactive to light.   Neck: Normal range of motion. Neck supple. No JVD present.   Cardiovascular:   Smooth vad sounds     Pulmonary/Chest: Effort normal and breath sounds normal. No respiratory distress. He has no wheezes. He has no rales. He exhibits no tenderness.   Abdominal: Soft. Bowel sounds are normal. He exhibits no distension. There is no tenderness.   Musculoskeletal: Normal range of motion. He exhibits no edema or tenderness.   Neurological: He is alert and oriented to person, place, and time.   Skin:  Skin is warm and dry. No erythema. No pallor.       Significant Labs:   EP:   Recent Labs  Lab 08/02/17 0310 08/02/17  1502 08/02/17 2106 08/03/17  0400   *  < > 133* 134* 136   K 3.9  < > 4.3 4.1 4.0   CL 99  < > 99 99 99   CO2 24  < > 22* 22* 22*     < > 114* 95 90   BUN 41*  < > 41* 41* 45*   CREATININE 1.8*  < > 1.8* 1.9* 2.0*   CALCIUM 8.8  < > 8.9 8.7 9.3   PROT 6.7  --   --   --   --    ALBUMIN 3.0*  --   --   --   --    BILITOT 2.3*  --   --   --   --    ALKPHOS 56  --   --   --   --    AST 24  --   --   --   --    ALT 22  --   --   --   --    ANIONGAP 12  < > 12 13 15   ESTGFRAFRICA 44.0*  < > 44.0* 41.3* 38.8*   EGFRNONAA 38.1*  < > 38.1* 35.7* 33.5*   WBC 9.84  --   --   --  9.84   HGB 9.5*  --   --   --  9.3*   HCT 27.4*  --   --   --  27.3*     --   --   --  220   INR 1.1  --   --   --   --    < > = values in this interval not displayed., CMP:   Recent Labs  Lab 08/02/17 0310 08/02/17  1502 08/02/17 2106 08/03/17  0400   *  < > 133* 134* 136   K 3.9  < > 4.3 4.1 4.0   CL 99  < > 99 99 99   CO2 24  < > 22* 22* 22*     < > 114* 95 90   BUN 41*  < > 41* 41* 45*   CREATININE 1.8*  < > 1.8* 1.9* 2.0*   CALCIUM 8.8  < > 8.9 8.7 9.3   PROT 6.7  --   --   --   --    ALBUMIN 3.0*  --   --   --   --    BILITOT 2.3*  --   --   --   --    ALKPHOS 56  --   --   --   --    AST 24  --   --   --   --    ALT 22  --   --   --   --    ANIONGAP 12  < > 12 13 15   ESTGFRAFRICA 44.0*  < > 44.0* 41.3* 38.8*   EGFRNONAA 38.1*  < > 38.1* 35.7* 33.5*   < > = values in this interval not displayed., CBC:   Recent Labs  Lab 08/02/17  0310 08/03/17  0400   WBC 9.84 9.84   HGB 9.5* 9.3*   HCT 27.4* 27.3*    220    and INR:   Recent Labs  Lab 08/02/17  0310   INR 1.1       Significant Imaging: Echocardiogram:   2D echo with color flow doppler:   Results for orders placed or performed during the hospital encounter of 07/18/17   2D echo with color flow doppler   Result Value Ref Range     Est. PA Systolic Pressure 33.87     Tricuspid Valve Regurgitation MILD      Assessment and Plan:     AICD (automatic cardioverter/defibrillator) present    -bridging to coumadin, INR still 1.1. Cannot perform while still on heparin due to bleeding risk so need to await therapeutic INR before any interventions.  -Patient needs RV lead replacement vs revision given persistent dysfunction of RV lead  -ICD is off but patient has pads to chest wall in case it is needed  -has frequent PVCs at his baseline and this continues  -Has LVAD (HMIII) in place at this time  -will follow along peripherally and assess when would be appropriate to intervene per coags        AICD discharge    Mr. Hayden is a 67 year old man with advanced NICM (LVEF 10%) awaiting LVAD, HTN HLD who presented with syncope and ICD discharge for VT. Underlying his VT, he also has an organized atrial tachycardia which appears to be new from his EGM since May 2017. He is acutely ill and would benefit from aggressive AAD therapy and treatment of his decompensated heart failure. Furthermore, with newly discovered atrial tachyarrhythmia, he would benefit from full anticoagulation in conjunction with AAD therapy.     --continue full IV amiodarone load; plan on transition to PO amiodarone 400mg BID tomorrow x 2 weeks then likely 400mg daily thereafter  --initiate heparin gtt to prevent RUTH thrombus and CVA given atrial tachyarrhythmia and high CVA risk  --will discuss more definitive arrhythmia strategies once he is stabilized.        V-tach    Currently on amiodarone 400mg po BID   Continue Mg and K repletion maintaining K>4, Mg>2        Atrial Fibrillation       Persistent       Continue heparin gtt and coumadin bridge    Casey Newman MD  Cardiac Electrophysiology  [facility]

## 2017-08-03 NOTE — ASSESSMENT & PLAN NOTE
- CTS Primary  - s/p LVAD HM III (7/27/17), speed @ 5200 rpm  - chest closure (7/28/17)  - extubated (7/29/17)  - on , epi, lasix 20. Creatinine rising, would consider backing off diuretics.   - off cardene, vasopressin gtt  -Anticoagulation per CTS, heparin gtt subtherapeutic x 3 readings

## 2017-08-03 NOTE — ASSESSMENT & PLAN NOTE
-bridging to coumadin, INR still 1.1. Cannot perform while still on heparin due to bleeding risk so need to await therapeutic INR before any interventions.  -Patient needs RV lead replacement vs revision given persistent dysfunction of RV lead  -ICD is off but patient has pads to chest wall in case it is needed  -has frequent PVCs at his baseline and this continues  -Has LVAD (HMIII) in place at this time  -will follow along peripherally and assess when would be appropriate to intervene per coags

## 2017-08-03 NOTE — PROGRESS NOTES
Ok to hold AM dose of Norvasc due to BP trends, dose to be adjusted for tomorrow. Will continue to monitor.

## 2017-08-03 NOTE — PT/OT/SLP PROGRESS
Physical Therapy  Treatment    uSman Hayden   MRN: [id number]   Admitting Diagnosis: Acute on chronic combined systolic and diastolic heart failure    PT Received On: 08/03/17  PT Start Time: 0859     PT Stop Time: 0934    PT Total Time (min): 35 min       Billable Minutes:  Gait [id number] min    Treatment Type: Other (see comments) (co-treatment with OT)  PT/PTA: PT     PTA Visit Number: 0       General Precautions: Standard, fall, LVAD, sternal  Orthopedic Precautions: N/A   Braces:      Do you have any cultural, spiritual, Adventism conflicts, given your current situation?: none noted     Subjective:  Communicated with nurse prior to session.      Pain/Comfort  Pain Rating 1: 0/10  Pain Rating Post-Intervention 1: 0/10    Objective:   Patient found with: telemetry, arterial line, pulse ox (continuous), chest tube, delgado catheter, central line, oxygen (LVAD)    Functional Mobility:  Bed Mobility:   Rolling/Turning to Left:  (not tested. pt was sitting in chair for treatment. )    Transfers:  Sit <> Stand Assistance: Moderate Assistance (pt needed verbal cues for functional mobility with sternal precautions. )    Gait:   Gait Distance: 48 ft x 2 reps with sitting rest period between distance ambulated. pt had O2 intact with gait training.   Assistance 1: Total assistance (min assist x 2. Nusing present with monitor with gait training. )  Gait Assistive Device:  (liven good walker)    AM-PAC 6 CLICK MOBILITY  How much help from another person does this patient currently need?   1 = Unable, Total/Dependent Assistance  2 = A lot, Maximum/Moderate Assistance  3 = A little, Minimum/Contact Guard/Supervision  4 = None, Modified [place]/Independent    Turning over in bed (including adjusting bedclothes, sheets and blankets)?: 2  Sitting down on and standing up from a chair with arms (e.g., wheelchair, bedside commode, etc.): 2  Moving from lying on back to sitting on the side of the bed?: 2  Moving to and from a bed to  a chair (including a wheelchair)?: 3  Need to walk in hospital room?: 2  Climbing 3-5 steps with a railing?: 1  Total Score: 12    AM-PAC Raw Score CMS G-Code Modifier Level of Impairment Assistance   6 % Total / Unable   7 - 9 CM 80 - 100% Maximal Assist   10 - 14 CL 60 - 80% Moderate Assist   15 - 19 CK 40 - 60% Moderate Assist   20 - 22 CJ 20 - 40% Minimal Assist   23 CI 1-20% SBA / CGA   24 CH 0% Independent/ Mod I     Patient left up in chair with all lines intact, call button in reach and wife present.    Assessment:  Suman Hayden is a 67 y.o. male with a medical diagnosis of Acute on chronic combined systolic and diastolic heart failure and presents with decreased mobility, transfers and decreased distance ambulated. Pt would benefit from cont PT to address deficits and should be able to discharge home with HHPT. Pt will benefit from skilled PT 6x/wk to progress physically and return to Independent status. Pt is s/p LVAD HM 3 placement, chest closed 17.    Rehab identified problem list/impairments: Rehab identified problem list/impairments: weakness, impaired endurance, impaired functional mobilty, gait instability, decreased lower extremity function    Rehab potential is good.    Activity tolerance: Good    Discharge recommendations: Discharge Facility/Level Of Care Needs: home health PT     Barriers to discharge: Barriers to Discharge: None    Equipment recommendations: Equipment Needed After Discharge: none     GOALS:    Physical Therapy Goals        Problem: Physical Therapy Goal    Goal Priority Disciplines Outcome Goal Variances Interventions   Physical Therapy Goal     PT/OT, PT Ongoing (interventions implemented as appropriate)     Description:  Goals to be met by: 17     Patient will increase functional independence with mobility by performin. Supine to sit with MInimal Assistance- not met  2. Sit to supine with MInimal Assistance - not met  3. Sit to stand transfer with  Minimal Assistance- not met  4. Bed to chair transfer with Minimal Assistance- not met  5. Gait  x 50 feet with Minimal Assistance. - not met  6. Lower extremity exercise program x15 reps, with supervision, in order to increase LE strength and (I) with functional mobility. - not met                       PLAN:    Patient to be seen 6 x/week  to address the above listed problems via gait training, therapeutic activities, therapeutic exercises  Plan of Care expires: 08/27/17  Plan of Care reviewed with: patient, spouse         Astrid Whaley, PT  08/03/2017

## 2017-08-03 NOTE — ASSESSMENT & PLAN NOTE
- appropriate in the setting of VT aggravated by underlying AT/AFL (earlier during the admission)  - 7/28 - setting of AF undersense - device reprogrammed to VVI 80  - tachy therapy turned off  - on amio gtt  - EP planning to do lead revision once INR theraputic

## 2017-08-03 NOTE — PLAN OF CARE
Problem: Physical Therapy Goal  Goal: Physical Therapy Goal  Goals to be met by: 17     Patient will increase functional independence with mobility by performin. Supine to sit with MInimal Assistance- not met  2. Sit to supine with MInimal Assistance - not met  3. Sit to stand transfer with Minimal Assistance- not met  4. Bed to chair transfer with Minimal Assistance- not met  5. Gait  x 50 feet with Minimal Assistance. - not met  6. Lower extremity exercise program x15 reps, with supervision, in order to increase LE strength and (I) with functional mobility. - not met      Goals remain appropriate. Astrid Whaley, PT 8/3/2017

## 2017-08-03 NOTE — PROGRESS NOTES
[facility]  Heart Transplant  Progress Note    Patient Name: Suman Hayden  MRN: [id number]  Admission Date: 7/18/2017  Hospital Length of Stay: 16 days  Attending Physician: Sunny Downing MD  Primary Care Provider: Joe Ernst MD  Principal Problem:Acute on chronic combined systolic and diastolic heart failure    Subjective:     Interval History: C/o nausea, emesis with pills. Denies flatus. No BM yet. Sitting up in chair, otherwise doing well. No questions at this time.     Continuous Infusions:   dextrose 5 % and 0.9 % NaCl Stopped (08/03/17 0800)    dextrose 5 % and 0.9 % NaCl      DOBUTamine 5 mcg/kg/min (08/03/17 0900)    epinephrine infusion (NON-TITRATING) 0.03 mcg/kg/min (08/03/17 0952)    furosemide (LASIX) 1 mg/mL infusion (non-titrating) 20 mg/hr (08/03/17 0900)    heparin (porcine) in 5 % dex 1,200 Units/hr (08/03/17 0900)    nicardipine Stopped (08/02/17 2244)     Scheduled Meds:   amiodarone  400 mg Oral BID    amlodipine  10 mg Oral Daily    aspirin  325 mg Oral Daily    docusate sodium  200 mg Oral QHS    ferrous gluconate  324 mg Oral Daily with breakfast    magnesium oxide  400 mg Oral TID    pantoprazole  40 mg Oral Daily    polyethylene glycol  17 g Oral BID    potassium chloride  20 mEq Oral BID    pravastatin  20 mg Oral QHS    sodium chloride 0.9%  10 mL Intravenous Q6H    sodium chloride 0.9%  3 mL Intravenous Q8H    warfarin  6 mg Oral Daily     PRN Meds:albumin human 5%, albuterol sulfate, bisacodyl, fentaNYL, hydrALAZINE, magnesium hydroxide 400 mg/5 ml, magnesium sulfate IVPB, ondansetron, oxycodone, oxycodone, potassium chloride, potassium chloride, Flushing PICC Protocol **AND** sodium chloride 0.9% **AND** sodium chloride 0.9%, sodium phosphate IVPB, sodium phosphate IVPB, sodium phosphate IVPB    Review of patient's allergies indicates:  No Known Allergies  Objective:     Vital Signs (Most Recent):  Temp: 98.9 °F (37.2 °C) (08/02/17  2300)  Pulse: 97 (08/03/17 0936)  Resp: 20 (08/03/17 0936)  BP: (!) 89/0 (08/03/17 0500)  SpO2: 95 % (08/03/17 0936) Vital Signs (24h Range):  Temp:  [97.9 °F (36.6 °C)-98.9 °F (37.2 °C)] 98.9 °F (37.2 °C)  Pulse:  [] 97  Resp:  [17-41] 20  SpO2:  [94 %-100 %] 95 %  BP: (88-91)/(0) 89/0  Arterial Line BP: ()/(49-79) 85/60     Weight: 81.2 kg (179 lb 0.2 oz)  Body mass index is 27.22 kg/m².      Intake/Output Summary (Last 24 hours) at 08/03/17 1031  Last data filed at 08/03/17 0900   Gross per 24 hour   Intake           1820.5 ml   Output             2445 ml   Net           -624.5 ml       Hemodynamic Parameters:           Physical Exam   Constitutional: He appears well-developed and well-nourished. No distress.   HENT:   Head: Normocephalic and atraumatic.   Eyes: Pupils are equal, round, and reactive to light.   Neck: Normal range of motion. Neck supple. No JVD present.   Cardiovascular: Normal rate and regular rhythm.  Exam reveals no gallop and no friction rub.    No murmur heard.  VAD hum smooth   Pulmonary/Chest: Effort normal. No respiratory distress. He has no wheezes. He has no rales.   Abdominal: Soft. He exhibits distension (mild). There is no tenderness. There is no guarding.   Musculoskeletal: Normal range of motion. He exhibits no edema.   Neurological: He is alert. No cranial nerve deficit. Coordination normal.   Skin: Skin is warm. He is not diaphoretic. No erythema.   Vitals reviewed.      Significant Labs:  CBC:    Recent Labs  Lab 08/03/17  0400   WBC 9.84   RBC 3.33*   HGB 9.3*   HCT 27.3*      MCV 82   MCH 27.9   MCHC 34.1     BNP:    Recent Labs  Lab 08/02/17  0310   BNP 1,398*     CMP:    Recent Labs  Lab 08/02/17  0310  08/03/17  0400     < > 90   CALCIUM 8.8  < > 9.3   ALBUMIN 3.0*  --   --    PROT 6.7  --   --    *  < > 136   K 3.9  < > 4.0   CO2 24  < > 22*   CL 99  < > 99   BUN 41*  < > 45*   CREATININE 1.8*  < > 2.0*   ALKPHOS 56  --   --    ALT 22  --    --    AST 24  --   --    BILITOT 2.3*  --   --    < > = values in this interval not displayed.   Coagulation:     Recent Labs  Lab 08/02/17  0310  08/03/17  0435   INR 1.1  --   --    APTT 45.3*  < > 26.1   < > = values in this interval not displayed.  LDH:    Recent Labs  Lab 08/01/17  0405 08/02/17  0310 08/03/17  0400   * 315* 285*     Microbiology:  Microbiology Results (last 7 days)     Procedure Component Value Units Date/Time    Blood culture [[id number]] Collected:  07/24/17 1300    Order Status:  Completed Specimen:  Blood from Peripheral, Hand, Right Updated:  07/29/17 1812     Blood Culture, Routine No growth after 5 days.    Urine culture [[id number]]  (Susceptibility) Collected:  07/24/17 1139    Order Status:  Completed Specimen:  Urine from Urine, Clean Catch Updated:  07/29/17 1506     Urine Culture, Routine --     ENTEROCOCCUS FAECALIS  >100,000 cfu/ml  No other significant isolate      Blood culture [[id number]] Collected:  07/24/17 1100    Order Status:  Completed Specimen:  Blood from Peripheral, Antecubital, Left Updated:  07/29/17 1412     Blood Culture, Routine No growth after 5 days.          I have reviewed all pertinent labs within the past 24 hours.    Estimated Creatinine Clearance: 34.7 mL/min (based on Cr of 2).    Diagnostic Results:  I have reviewed and interpreted all pertinent imaging results/findings within the past 24 hours.    Assessment and Plan:     Atrial fibrillation    - c/w heparin gtt  - c/w amio gtt        LVAD (left ventricular assist device) present    - LVAD HM III  - Speed 5200  - no events  - LDH increased to 507. Monitor daily  - INR 1.1  - on heparin gtt        Urine culture positive    - afebrile, treated with IV cefipime         Atrial tachycardia    - [id number] - 3  - c/w amiodarone  - c/w heparin gtt        AICD discharge    - appropriate in the setting of VT aggravated by underlying AT/AFL (earlier during the admission)  - 7/28 - setting of AF undersense  - device reprogrammed to VVI 80  - tachy therapy turned off  - on amio gtt  - EP planning to do lead revision once INR theraputic         Hepatitis B core antibody positive since 2012    - will defer liver biopsy as CTS not requiring it  - ID consult cleared the patient for sx  - case discussed with hepatology, low suspicion for liver involvement        V-tach    - s/p amiodarone reload - now on amio gtt        Hyperlipidemia    - c/w pravastatin        * Acute on chronic combined systolic and diastolic heart failure    - CTS Primary  - s/p LVAD HM III (7/27/17), speed @ 5200 rpm  - chest closure (7/28/17)  - extubated (7/29/17)  - on , epi, lasix 20. Creatinine rising, would consider backing off diuretics.   - off cardene, vasopressin gtt  -Anticoagulation per CTS, heparin gtt subtherapeutic x 3 readings                  Abigail Green PA-C  Heart Transplant  [facility]

## 2017-08-03 NOTE — SUBJECTIVE & OBJECTIVE
Interval History: awaiting therapeutic INR    Review of Systems   Constitution: Negative.   HENT: Negative.    Eyes: Negative.    Cardiovascular: Negative.    Respiratory: Negative.    Endocrine: Negative.    Skin: Negative.    Musculoskeletal: Negative.    Gastrointestinal: Negative.    Genitourinary: Negative.    Neurological: Negative.      Objective:     Vital Signs (Most Recent):  Temp: 98.9 °F (37.2 °C) (08/02/17 2300)  Pulse: 96 (08/03/17 0800)  Resp: 20 (08/03/17 0800)  BP: (!) 89/0 (08/03/17 0500)  SpO2: 95 % (08/03/17 0730) Vital Signs (24h Range):  Temp:  [97.9 °F (36.6 °C)-98.9 °F (37.2 °C)] 98.9 °F (37.2 °C)  Pulse:  [] 96  Resp:  [17-41] 20  SpO2:  [94 %-100 %] 95 %  BP: ()/(0-99) 89/0  Arterial Line BP: ()/(49-79) 81/62     Weight: 81.2 kg (179 lb 0.2 oz)  Body mass index is 27.22 kg/m².     SpO2: 95 %  O2 Device (Oxygen Therapy): nasal cannula    Physical Exam   Constitutional: He is oriented to person, place, and time. He appears well-developed and well-nourished.   HENT:   Head: Normocephalic and atraumatic.   Eyes: Conjunctivae and EOM are normal. Pupils are equal, round, and reactive to light.   Neck: Normal range of motion. Neck supple. No JVD present.   Cardiovascular:   Smooth vad sounds     Pulmonary/Chest: Effort normal and breath sounds normal. No respiratory distress. He has no wheezes. He has no rales. He exhibits no tenderness.   Abdominal: Soft. Bowel sounds are normal. He exhibits no distension. There is no tenderness.   Musculoskeletal: Normal range of motion. He exhibits no edema or tenderness.   Neurological: He is alert and oriented to person, place, and time.   Skin: Skin is warm and dry. No erythema. No pallor.       Significant Labs:   EP:   Recent Labs  Lab 08/02/17  0310  08/02/17  1502 08/02/17  2106 08/03/17  0400   *  < > 133* 134* 136   K 3.9  < > 4.3 4.1 4.0   CL 99  < > 99 99 99   CO2 24  < > 22* 22* 22*     < > 114* 95 90   BUN 41*  < >  41* 41* 45*   CREATININE 1.8*  < > 1.8* 1.9* 2.0*   CALCIUM 8.8  < > 8.9 8.7 9.3   PROT 6.7  --   --   --   --    ALBUMIN 3.0*  --   --   --   --    BILITOT 2.3*  --   --   --   --    ALKPHOS 56  --   --   --   --    AST 24  --   --   --   --    ALT 22  --   --   --   --    ANIONGAP 12  < > 12 13 15   ESTGFRAFRICA 44.0*  < > 44.0* 41.3* 38.8*   EGFRNONAA 38.1*  < > 38.1* 35.7* 33.5*   WBC 9.84  --   --   --  9.84   HGB 9.5*  --   --   --  9.3*   HCT 27.4*  --   --   --  27.3*     --   --   --  220   INR 1.1  --   --   --   --    < > = values in this interval not displayed., CMP:   Recent Labs  Lab 08/02/17  0310  08/02/17  1502 08/02/17  2106 08/03/17  0400   *  < > 133* 134* 136   K 3.9  < > 4.3 4.1 4.0   CL 99  < > 99 99 99   CO2 24  < > 22* 22* 22*     < > 114* 95 90   BUN 41*  < > 41* 41* 45*   CREATININE 1.8*  < > 1.8* 1.9* 2.0*   CALCIUM 8.8  < > 8.9 8.7 9.3   PROT 6.7  --   --   --   --    ALBUMIN 3.0*  --   --   --   --    BILITOT 2.3*  --   --   --   --    ALKPHOS 56  --   --   --   --    AST 24  --   --   --   --    ALT 22  --   --   --   --    ANIONGAP 12  < > 12 13 15   ESTGFRAFRICA 44.0*  < > 44.0* 41.3* 38.8*   EGFRNONAA 38.1*  < > 38.1* 35.7* 33.5*   < > = values in this interval not displayed., CBC:   Recent Labs  Lab 08/02/17  0310 08/03/17  0400   WBC 9.84 9.84   HGB 9.5* 9.3*   HCT 27.4* 27.3*    220    and INR:   Recent Labs  Lab 08/02/17  0310   INR 1.1       Significant Imaging: Echocardiogram:   2D echo with color flow doppler:   Results for orders placed or performed during the hospital encounter of 07/18/17   2D echo with color flow doppler   Result Value Ref Range    Est. PA Systolic Pressure 33.87     Tricuspid Valve Regurgitation MILD

## 2017-08-03 NOTE — PROGRESS NOTES
Pt AAOx4. On 1L NC, tolerating well. Pt opens eyes spontaneously. Following commands, nods/gestures appropriately. Moving all extremities freely/equally. Denies pain. Plan of care reviewed with pt and family. See flowsheet for details.

## 2017-08-03 NOTE — SUBJECTIVE & OBJECTIVE
Interval History: C/o nausea, emesis with pills. Denies flatus. No BM yet. Sitting up in chair, otherwise doing well. No questions at this time.     Continuous Infusions:   dextrose 5 % and 0.9 % NaCl Stopped (08/03/17 0800)    dextrose 5 % and 0.9 % NaCl      DOBUTamine 5 mcg/kg/min (08/03/17 0900)    epinephrine infusion (NON-TITRATING) 0.03 mcg/kg/min (08/03/17 0952)    furosemide (LASIX) 1 mg/mL infusion (non-titrating) 20 mg/hr (08/03/17 0900)    heparin (porcine) in 5 % dex 1,200 Units/hr (08/03/17 0900)    nicardipine Stopped (08/02/17 2244)     Scheduled Meds:   amiodarone  400 mg Oral BID    amlodipine  10 mg Oral Daily    aspirin  325 mg Oral Daily    docusate sodium  200 mg Oral QHS    ferrous gluconate  324 mg Oral Daily with breakfast    magnesium oxide  400 mg Oral TID    pantoprazole  40 mg Oral Daily    polyethylene glycol  17 g Oral BID    potassium chloride  20 mEq Oral BID    pravastatin  20 mg Oral QHS    sodium chloride 0.9%  10 mL Intravenous Q6H    sodium chloride 0.9%  3 mL Intravenous Q8H    warfarin  6 mg Oral Daily     PRN Meds:albumin human 5%, albuterol sulfate, bisacodyl, fentaNYL, hydrALAZINE, magnesium hydroxide 400 mg/5 ml, magnesium sulfate IVPB, ondansetron, oxycodone, oxycodone, potassium chloride, potassium chloride, Flushing PICC Protocol **AND** sodium chloride 0.9% **AND** sodium chloride 0.9%, sodium phosphate IVPB, sodium phosphate IVPB, sodium phosphate IVPB    Review of patient's allergies indicates:  No Known Allergies  Objective:     Vital Signs (Most Recent):  Temp: 98.9 °F (37.2 °C) (08/02/17 2300)  Pulse: 97 (08/03/17 0936)  Resp: 20 (08/03/17 0936)  BP: (!) 89/0 (08/03/17 0500)  SpO2: 95 % (08/03/17 0936) Vital Signs (24h Range):  Temp:  [97.9 °F (36.6 °C)-98.9 °F (37.2 °C)] 98.9 °F (37.2 °C)  Pulse:  [] 97  Resp:  [17-41] 20  SpO2:  [94 %-100 %] 95 %  BP: (88-91)/(0) 89/0  Arterial Line BP: ()/(49-79) 85/60     Weight: 81.2 kg (179 lb  0.2 oz)  Body mass index is 27.22 kg/m².      Intake/Output Summary (Last 24 hours) at 08/03/17 1031  Last data filed at 08/03/17 0900   Gross per 24 hour   Intake           1820.5 ml   Output             2445 ml   Net           -624.5 ml       Hemodynamic Parameters:           Physical Exam   Constitutional: He appears well-developed and well-nourished. No distress.   HENT:   Head: Normocephalic and atraumatic.   Eyes: Pupils are equal, round, and reactive to light.   Neck: Normal range of motion. Neck supple. No JVD present.   Cardiovascular: Normal rate and regular rhythm.  Exam reveals no gallop and no friction rub.    No murmur heard.  VAD hum smooth   Pulmonary/Chest: Effort normal. No respiratory distress. He has no wheezes. He has no rales.   Abdominal: Soft. He exhibits distension (mild). There is no tenderness. There is no guarding.   Musculoskeletal: Normal range of motion. He exhibits no edema.   Neurological: He is alert. No cranial nerve deficit. Coordination normal.   Skin: Skin is warm. He is not diaphoretic. No erythema.   Vitals reviewed.      Significant Labs:  CBC:    Recent Labs  Lab 08/03/17  0400   WBC 9.84   RBC 3.33*   HGB 9.3*   HCT 27.3*      MCV 82   MCH 27.9   MCHC 34.1     BNP:    Recent Labs  Lab 08/02/17  0310   BNP 1,398*     CMP:    Recent Labs  Lab 08/02/17  0310  08/03/17  0400     < > 90   CALCIUM 8.8  < > 9.3   ALBUMIN 3.0*  --   --    PROT 6.7  --   --    *  < > 136   K 3.9  < > 4.0   CO2 24  < > 22*   CL 99  < > 99   BUN 41*  < > 45*   CREATININE 1.8*  < > 2.0*   ALKPHOS 56  --   --    ALT 22  --   --    AST 24  --   --    BILITOT 2.3*  --   --    < > = values in this interval not displayed.   Coagulation:     Recent Labs  Lab 08/02/17  0310  08/03/17  0435   INR 1.1  --   --    APTT 45.3*  < > 26.1   < > = values in this interval not displayed.  LDH:    Recent Labs  Lab 08/01/17  0405 08/02/17  0310 08/03/17  0400   * 315* 285*      Microbiology:  Microbiology Results (last 7 days)     Procedure Component Value Units Date/Time    Blood culture [[id number]] Collected:  07/24/17 1300    Order Status:  Completed Specimen:  Blood from Peripheral, Hand, Right Updated:  07/29/17 1812     Blood Culture, Routine No growth after 5 days.    Urine culture [[id number]]  (Susceptibility) Collected:  07/24/17 1139    Order Status:  Completed Specimen:  Urine from Urine, Clean Catch Updated:  07/29/17 1506     Urine Culture, Routine --     ENTEROCOCCUS FAECALIS  >100,000 cfu/ml  No other significant isolate      Blood culture [[id number]] Collected:  07/24/17 1100    Order Status:  Completed Specimen:  Blood from Peripheral, Antecubital, Left Updated:  07/29/17 1412     Blood Culture, Routine No growth after 5 days.          I have reviewed all pertinent labs within the past 24 hours.    Estimated Creatinine Clearance: 34.7 mL/min (based on Cr of 2).    Diagnostic Results:  I have reviewed and interpreted all pertinent imaging results/findings within the past 24 hours.

## 2017-08-04 LAB
ALBUMIN SERPL BCP-MCNC: 2.9 G/DL
ALBUMIN SERPL BCP-MCNC: 2.9 G/DL
ALLENS TEST: ABNORMAL
ALP SERPL-CCNC: 57 U/L
ALP SERPL-CCNC: 61 U/L
ALT SERPL W/O P-5'-P-CCNC: 15 U/L
ALT SERPL W/O P-5'-P-CCNC: 16 U/L
ANION GAP SERPL CALC-SCNC: 12 MMOL/L
ANION GAP SERPL CALC-SCNC: 13 MMOL/L
ANION GAP SERPL CALC-SCNC: 13 MMOL/L
ANISOCYTOSIS BLD QL SMEAR: ABNORMAL
APTT BLDCRRT: 28.7 SEC
APTT BLDCRRT: 31 SEC
APTT BLDCRRT: 31 SEC
APTT BLDCRRT: 37.4 SEC
APTT BLDCRRT: 59.6 SEC
AST SERPL-CCNC: 19 U/L
AST SERPL-CCNC: 29 U/L
BACTERIA #/AREA URNS AUTO: ABNORMAL /HPF
BASO STIPL BLD QL SMEAR: ABNORMAL
BASOPHILS # BLD AUTO: 0.01 K/UL
BASOPHILS # BLD AUTO: 0.01 K/UL
BASOPHILS NFR BLD: 0.1 %
BASOPHILS NFR BLD: 0.1 %
BILIRUB DIRECT SERPL-MCNC: 1.3 MG/DL
BILIRUB SERPL-MCNC: 1.9 MG/DL
BILIRUB SERPL-MCNC: 2.1 MG/DL
BILIRUB UR QL STRIP: NEGATIVE
BNP SERPL-MCNC: 1381 PG/ML
BUN SERPL-MCNC: 45 MG/DL
BUN SERPL-MCNC: 48 MG/DL
BUN SERPL-MCNC: 48 MG/DL
BURR CELLS BLD QL SMEAR: ABNORMAL
CALCIUM SERPL-MCNC: 8.5 MG/DL
CALCIUM SERPL-MCNC: 8.7 MG/DL
CALCIUM SERPL-MCNC: 8.8 MG/DL
CALCIUM SERPL-MCNC: 8.8 MG/DL
CALCIUM SERPL-MCNC: 8.9 MG/DL
CHLORIDE SERPL-SCNC: 100 MMOL/L
CHLORIDE SERPL-SCNC: 101 MMOL/L
CHLORIDE SERPL-SCNC: 103 MMOL/L
CLARITY UR REFRACT.AUTO: ABNORMAL
CO2 SERPL-SCNC: 21 MMOL/L
CO2 SERPL-SCNC: 21 MMOL/L
CO2 SERPL-SCNC: 22 MMOL/L
CO2 SERPL-SCNC: 23 MMOL/L
CO2 SERPL-SCNC: 23 MMOL/L
COLOR UR AUTO: ABNORMAL
CREAT SERPL-MCNC: 2 MG/DL
CREAT SERPL-MCNC: 2 MG/DL
CREAT SERPL-MCNC: 2.1 MG/DL
CRP SERPL-MCNC: 68.3 MG/L
DELSYS: ABNORMAL
DELSYS: ABNORMAL
DIFFERENTIAL METHOD: ABNORMAL
DIFFERENTIAL METHOD: ABNORMAL
EOSINOPHIL # BLD AUTO: 0.1 K/UL
EOSINOPHIL # BLD AUTO: 0.2 K/UL
EOSINOPHIL NFR BLD: 0.4 %
EOSINOPHIL NFR BLD: 1.6 %
ERYTHROCYTE [DISTWIDTH] IN BLOOD BY AUTOMATED COUNT: 15.3 %
ERYTHROCYTE [DISTWIDTH] IN BLOOD BY AUTOMATED COUNT: 15.4 %
EST. GFR  (AFRICAN AMERICAN): 36.6 ML/MIN/1.73 M^2
EST. GFR  (AFRICAN AMERICAN): 38.8 ML/MIN/1.73 M^2
EST. GFR  (AFRICAN AMERICAN): 38.8 ML/MIN/1.73 M^2
EST. GFR  (NON AFRICAN AMERICAN): 31.6 ML/MIN/1.73 M^2
EST. GFR  (NON AFRICAN AMERICAN): 33.5 ML/MIN/1.73 M^2
EST. GFR  (NON AFRICAN AMERICAN): 33.5 ML/MIN/1.73 M^2
FLOW: 2
GIANT PLATELETS BLD QL SMEAR: PRESENT
GLUCOSE SERPL-MCNC: 108 MG/DL
GLUCOSE SERPL-MCNC: 124 MG/DL
GLUCOSE SERPL-MCNC: 91 MG/DL
GLUCOSE SERPL-MCNC: 94 MG/DL
GLUCOSE SERPL-MCNC: 94 MG/DL
GLUCOSE UR QL STRIP: NEGATIVE
HCO3 UR-SCNC: 24.5 MMOL/L (ref 24–28)
HCO3 UR-SCNC: 26.1 MMOL/L (ref 24–28)
HCO3 UR-SCNC: 26.9 MMOL/L (ref 24–28)
HCT VFR BLD AUTO: 28.4 %
HCT VFR BLD AUTO: 28.7 %
HGB BLD-MCNC: 10 G/DL
HGB BLD-MCNC: 9.8 G/DL
HGB UR QL STRIP: ABNORMAL
HYALINE CASTS UR QL AUTO: 12 /LPF
HYPOCHROMIA BLD QL SMEAR: ABNORMAL
INR PPP: 2.9
INR PPP: 3.4
KETONES UR QL STRIP: NEGATIVE
LDH SERPL L TO P-CCNC: 0.9 MMOL/L (ref 0.36–1.25)
LDH SERPL L TO P-CCNC: 289 U/L
LEUKOCYTE ESTERASE UR QL STRIP: ABNORMAL
LYMPHOCYTES # BLD AUTO: 0.9 K/UL
LYMPHOCYTES # BLD AUTO: 1.3 K/UL
LYMPHOCYTES NFR BLD: 11.4 %
LYMPHOCYTES NFR BLD: 4.7 %
MAGNESIUM SERPL-MCNC: 2.3 MG/DL
MAGNESIUM SERPL-MCNC: 2.3 MG/DL
MAGNESIUM SERPL-MCNC: 2.4 MG/DL
MCH RBC QN AUTO: 28.2 PG
MCH RBC QN AUTO: 28.2 PG
MCHC RBC AUTO-ENTMCNC: 34.5 G/DL
MCHC RBC AUTO-ENTMCNC: 34.8 G/DL
MCV RBC AUTO: 81 FL
MCV RBC AUTO: 82 FL
MICROSCOPIC COMMENT: ABNORMAL
MODE: ABNORMAL
MONOCYTES # BLD AUTO: 0.7 K/UL
MONOCYTES # BLD AUTO: 0.9 K/UL
MONOCYTES NFR BLD: 3.9 %
MONOCYTES NFR BLD: 8 %
NEUTROPHILS # BLD AUTO: 16.6 K/UL
NEUTROPHILS # BLD AUTO: 8.9 K/UL
NEUTROPHILS NFR BLD: 78.9 %
NEUTROPHILS NFR BLD: 90.9 %
NITRITE UR QL STRIP: NEGATIVE
OVALOCYTES BLD QL SMEAR: ABNORMAL
PCO2 BLDA: 28.2 MMHG (ref 35–45)
PCO2 BLDA: 33.4 MMHG (ref 35–45)
PCO2 BLDA: 36.3 MMHG (ref 35–45)
PH SMN: 7.48 [PH] (ref 7.35–7.45)
PH SMN: 7.5 [PH] (ref 7.35–7.45)
PH SMN: 7.55 [PH] (ref 7.35–7.45)
PH UR STRIP: 5 [PH] (ref 5–8)
PHOSPHATE SERPL-MCNC: 3.3 MG/DL
PHOSPHATE SERPL-MCNC: 3.8 MG/DL
PLATELET # BLD AUTO: 251 K/UL
PLATELET # BLD AUTO: 290 K/UL
PLATELET BLD QL SMEAR: ABNORMAL
PMV BLD AUTO: 10.7 FL
PMV BLD AUTO: 10.9 FL
PO2 BLDA: 102 MMHG (ref 80–100)
PO2 BLDA: 159 MMHG (ref 80–100)
PO2 BLDA: 93 MMHG (ref 80–100)
POC BE: 2 MMOL/L
POC BE: 3 MMOL/L
POC BE: 3 MMOL/L
POC SATURATED O2: 100 % (ref 95–100)
POC SATURATED O2: 98 % (ref 95–100)
POC SATURATED O2: 98 % (ref 95–100)
POC TCO2: 25 MMOL/L (ref 23–27)
POC TCO2: 27 MMOL/L (ref 23–27)
POC TCO2: 28 MMOL/L (ref 23–27)
POCT GLUCOSE: 92 MG/DL (ref 70–110)
POIKILOCYTOSIS BLD QL SMEAR: SLIGHT
POLYCHROMASIA BLD QL SMEAR: ABNORMAL
POTASSIUM SERPL-SCNC: 3.6 MMOL/L
POTASSIUM SERPL-SCNC: 3.7 MMOL/L
POTASSIUM SERPL-SCNC: 4.1 MMOL/L
POTASSIUM SERPL-SCNC: 4.2 MMOL/L
POTASSIUM SERPL-SCNC: 4.2 MMOL/L
PREALB SERPL-MCNC: 9 MG/DL
PROT SERPL-MCNC: 6.5 G/DL
PROT SERPL-MCNC: 6.6 G/DL
PROT UR QL STRIP: NEGATIVE
PROTHROMBIN TIME: 28.9 SEC
PROTHROMBIN TIME: 34.2 SEC
RBC # BLD AUTO: 3.48 M/UL
RBC # BLD AUTO: 3.54 M/UL
RBC #/AREA URNS AUTO: >100 /HPF (ref 0–4)
SAMPLE: ABNORMAL
SITE: ABNORMAL
SODIUM SERPL-SCNC: 135 MMOL/L
SODIUM SERPL-SCNC: 136 MMOL/L
SODIUM SERPL-SCNC: 137 MMOL/L
SP GR UR STRIP: 1.01 (ref 1–1.03)
SQUAMOUS #/AREA URNS AUTO: 2 /HPF
TOXIC GRANULES BLD QL SMEAR: PRESENT
URN SPEC COLLECT METH UR: ABNORMAL
UROBILINOGEN UR STRIP-ACNC: NEGATIVE EU/DL
WBC # BLD AUTO: 11.36 K/UL
WBC # BLD AUTO: 18.33 K/UL
WBC #/AREA URNS AUTO: >100 /HPF (ref 0–5)
WBC CLUMPS UR QL AUTO: ABNORMAL

## 2017-08-04 PROCEDURE — 99900035 HC TECH TIME PER 15 MIN (STAT)

## 2017-08-04 PROCEDURE — 97116 GAIT TRAINING THERAPY: CPT

## 2017-08-04 PROCEDURE — 27000221 HC OXYGEN, UP TO 24 HOURS

## 2017-08-04 PROCEDURE — A4216 STERILE WATER/SALINE, 10 ML: HCPCS | Performed by: THORACIC SURGERY (CARDIOTHORACIC VASCULAR SURGERY)

## 2017-08-04 PROCEDURE — 85025 COMPLETE CBC W/AUTO DIFF WBC: CPT

## 2017-08-04 PROCEDURE — 27200188 HC TRANSDUCER, ART ADULT/PEDS

## 2017-08-04 PROCEDURE — 63600175 PHARM REV CODE 636 W HCPCS: Performed by: STUDENT IN AN ORGANIZED HEALTH CARE EDUCATION/TRAINING PROGRAM

## 2017-08-04 PROCEDURE — 82803 BLOOD GASES ANY COMBINATION: CPT

## 2017-08-04 PROCEDURE — 20000000 HC ICU ROOM

## 2017-08-04 PROCEDURE — 81001 URINALYSIS AUTO W/SCOPE: CPT

## 2017-08-04 PROCEDURE — 93750 INTERROGATION VAD IN PERSON: CPT | Mod: ,,, | Performed by: THORACIC SURGERY (CARDIOTHORACIC VASCULAR SURGERY)

## 2017-08-04 PROCEDURE — 37799 UNLISTED PX VASCULAR SURGERY: CPT

## 2017-08-04 PROCEDURE — 87086 URINE CULTURE/COLONY COUNT: CPT

## 2017-08-04 PROCEDURE — 99233 SBSQ HOSP IP/OBS HIGH 50: CPT | Mod: ,,, | Performed by: INTERNAL MEDICINE

## 2017-08-04 PROCEDURE — P9045 ALBUMIN (HUMAN), 5%, 250 ML: HCPCS

## 2017-08-04 PROCEDURE — 85730 THROMBOPLASTIN TIME PARTIAL: CPT | Mod: 91

## 2017-08-04 PROCEDURE — 97803 MED NUTRITION INDIV SUBSEQ: CPT

## 2017-08-04 PROCEDURE — 63600175 PHARM REV CODE 636 W HCPCS

## 2017-08-04 PROCEDURE — 85610 PROTHROMBIN TIME: CPT | Mod: 91

## 2017-08-04 PROCEDURE — 87040 BLOOD CULTURE FOR BACTERIA: CPT

## 2017-08-04 PROCEDURE — 99233 SBSQ HOSP IP/OBS HIGH 50: CPT | Mod: GC,,, | Performed by: ANESTHESIOLOGY

## 2017-08-04 PROCEDURE — 25000003 PHARM REV CODE 250: Performed by: THORACIC SURGERY (CARDIOTHORACIC VASCULAR SURGERY)

## 2017-08-04 PROCEDURE — 84100 ASSAY OF PHOSPHORUS: CPT | Mod: 91

## 2017-08-04 PROCEDURE — 85610 PROTHROMBIN TIME: CPT

## 2017-08-04 PROCEDURE — 83615 LACTATE (LD) (LDH) ENZYME: CPT

## 2017-08-04 PROCEDURE — 83735 ASSAY OF MAGNESIUM: CPT | Mod: 91

## 2017-08-04 PROCEDURE — 25000003 PHARM REV CODE 250: Performed by: STUDENT IN AN ORGANIZED HEALTH CARE EDUCATION/TRAINING PROGRAM

## 2017-08-04 PROCEDURE — 80076 HEPATIC FUNCTION PANEL: CPT

## 2017-08-04 PROCEDURE — 93750 INTERROGATION VAD IN PERSON: CPT | Performed by: THORACIC SURGERY (CARDIOTHORACIC VASCULAR SURGERY)

## 2017-08-04 PROCEDURE — 94799 UNLISTED PULMONARY SVC/PX: CPT

## 2017-08-04 PROCEDURE — 84100 ASSAY OF PHOSPHORUS: CPT

## 2017-08-04 PROCEDURE — 97530 THERAPEUTIC ACTIVITIES: CPT

## 2017-08-04 PROCEDURE — 94761 N-INVAS EAR/PLS OXIMETRY MLT: CPT

## 2017-08-04 PROCEDURE — 99223 1ST HOSP IP/OBS HIGH 75: CPT | Mod: GC,,, | Performed by: INTERNAL MEDICINE

## 2017-08-04 PROCEDURE — 93750 INTERROGATION VAD IN PERSON: CPT | Mod: ,,, | Performed by: INTERNAL MEDICINE

## 2017-08-04 PROCEDURE — A4216 STERILE WATER/SALINE, 10 ML: HCPCS | Performed by: STUDENT IN AN ORGANIZED HEALTH CARE EDUCATION/TRAINING PROGRAM

## 2017-08-04 PROCEDURE — 80053 COMPREHEN METABOLIC PANEL: CPT

## 2017-08-04 PROCEDURE — 83880 ASSAY OF NATRIURETIC PEPTIDE: CPT

## 2017-08-04 PROCEDURE — 99233 SBSQ HOSP IP/OBS HIGH 50: CPT | Mod: 24,,, | Performed by: THORACIC SURGERY (CARDIOTHORACIC VASCULAR SURGERY)

## 2017-08-04 PROCEDURE — 27000248 HC VAD-ADDITIONAL DAY

## 2017-08-04 PROCEDURE — 86140 C-REACTIVE PROTEIN: CPT

## 2017-08-04 PROCEDURE — 80048 BASIC METABOLIC PNL TOTAL CA: CPT

## 2017-08-04 RX ORDER — ALBUMIN HUMAN 50 G/1000ML
SOLUTION INTRAVENOUS
Status: COMPLETED
Start: 2017-08-04 | End: 2017-08-04

## 2017-08-04 RX ORDER — ASPIRIN 325 MG
325 TABLET ORAL DAILY
Status: DISCONTINUED | OUTPATIENT
Start: 2017-08-04 | End: 2017-08-04

## 2017-08-04 RX ORDER — ALBUMIN HUMAN 50 G/1000ML
12.5 SOLUTION INTRAVENOUS ONCE
Status: COMPLETED | OUTPATIENT
Start: 2017-08-04 | End: 2017-08-04

## 2017-08-04 RX ORDER — PANTOPRAZOLE SODIUM 40 MG/10ML
40 INJECTION, POWDER, LYOPHILIZED, FOR SOLUTION INTRAVENOUS DAILY
Status: DISCONTINUED | OUTPATIENT
Start: 2017-08-04 | End: 2017-08-09

## 2017-08-04 RX ORDER — ASPIRIN 300 MG/1
300 SUPPOSITORY RECTAL DAILY
Status: DISCONTINUED | OUTPATIENT
Start: 2017-08-04 | End: 2017-08-09

## 2017-08-04 RX ADMIN — ALBUMIN HUMAN 12.5 G: 50 SOLUTION INTRAVENOUS at 06:08

## 2017-08-04 RX ADMIN — ASPIRIN 300 MG: 300 SUPPOSITORY RECTAL at 10:08

## 2017-08-04 RX ADMIN — AMIODARONE HYDROCHLORIDE 0.5 MG/MIN: 1.8 INJECTION, SOLUTION INTRAVENOUS at 04:08

## 2017-08-04 RX ADMIN — EPINEPHRINE 0.04 MCG/KG/MIN: 1 INJECTION PARENTERAL at 04:08

## 2017-08-04 RX ADMIN — Medication 3 ML: at 02:08

## 2017-08-04 RX ADMIN — FUROSEMIDE 20 MG/HR: 10 INJECTION, SOLUTION INTRAMUSCULAR; INTRAVENOUS at 10:08

## 2017-08-04 RX ADMIN — ONDANSETRON 4 MG: 2 INJECTION INTRAMUSCULAR; INTRAVENOUS at 05:08

## 2017-08-04 RX ADMIN — Medication 3 ML: at 05:08

## 2017-08-04 RX ADMIN — Medication 10 ML: at 06:08

## 2017-08-04 RX ADMIN — Medication 10 ML: at 05:08

## 2017-08-04 RX ADMIN — POTASSIUM CHLORIDE 40 MEQ: 200 INJECTION, SOLUTION INTRAVENOUS at 04:08

## 2017-08-04 RX ADMIN — Medication 3 ML: at 09:08

## 2017-08-04 RX ADMIN — ALBUMIN (HUMAN) 12.5 G: 12.5 SOLUTION INTRAVENOUS at 06:08

## 2017-08-04 RX ADMIN — POTASSIUM CHLORIDE 20 MEQ: 200 INJECTION, SOLUTION INTRAVENOUS at 10:08

## 2017-08-04 RX ADMIN — FUROSEMIDE 20 MG/HR: 10 INJECTION, SOLUTION INTRAMUSCULAR; INTRAVENOUS at 01:08

## 2017-08-04 NOTE — SUBJECTIVE & OBJECTIVE
Interval History: no acute events overnight    Review of Systems   Constitution: Negative.   HENT: Negative.    Eyes: Negative.    Cardiovascular: Negative.    Respiratory: Negative.    Endocrine: Negative.    Skin: Negative.    Musculoskeletal: Negative.    Gastrointestinal: Negative.    Genitourinary: Negative.    Neurological: Negative.      Objective:     Vital Signs (Most Recent):  Temp: 97.4 °F (36.3 °C) (08/04/17 0700)  Pulse: 110 (08/04/17 1315)  Resp: (!) 22 (08/04/17 1315)  BP: (!) 85/0 (08/04/17 1100)  SpO2: 98 % (08/04/17 1100) Vital Signs (24h Range):  Temp:  [97.4 °F (36.3 °C)-98.5 °F (36.9 °C)] 97.4 °F (36.3 °C)  Pulse:  [] 110  Resp:  [16-34] 22  SpO2:  [96 %-100 %] 98 %  BP: (78-88)/(0) 85/0  Arterial Line BP: ()/(48-84) 80/64     Weight: 81.2 kg (179 lb 0.2 oz)  Body mass index is 27.22 kg/m².     SpO2: 98 %  O2 Device (Oxygen Therapy): nasal cannula    Physical Exam   Constitutional: He is oriented to person, place, and time. He appears well-developed and well-nourished.   HENT:   Head: Normocephalic and atraumatic.   Eyes: Conjunctivae and EOM are normal. Pupils are equal, round, and reactive to light.   Neck: Normal range of motion. Neck supple. No JVD present.   Cardiovascular:   Smooth vad sounds     Pulmonary/Chest: Effort normal and breath sounds normal. No respiratory distress. He has no wheezes. He has no rales. He exhibits no tenderness.   Abdominal: Soft. Bowel sounds are normal. He exhibits no distension. There is no tenderness.   Musculoskeletal: Normal range of motion. He exhibits no edema or tenderness.   Neurological: He is alert and oriented to person, place, and time.   Skin: Skin is warm and dry. No erythema. No pallor.       Significant Labs: EP:   Recent Labs  Lab 08/03/17  0400  08/03/17  2104 08/04/17  0303 08/04/17  1049     < > 136 135* 135*  135*   K 4.0  < > 4.0 3.7 4.2  4.2   CL 99  < > 100 100 101  101   CO2 22*  < > 25 23 21*  21*   GLU 90  < >  113* 124* 94  94   BUN 45*  < > 44* 45* 48*  48*   CREATININE 2.0*  < > 2.0* 2.0* 2.1*  2.1*   CALCIUM 9.3  < > 8.7 8.7 8.8  8.8   PROT  --   --   --  6.5  --    ALBUMIN  --   --   --  2.9*  --    BILITOT  --   --   --  1.9*  --    ALKPHOS  --   --   --  57  --    AST  --   --   --  19  --    ALT  --   --   --  16  --    ANIONGAP 15  < > 11 12 13  13   ESTGFRAFRICA 38.8*  < > 38.8* 38.8* 36.6*  36.6*   EGFRNONAA 33.5*  < > 33.5* 33.5* 31.6*  31.6*   WBC 9.84  --   --  11.36  --    HGB 9.3*  --   --  9.8*  --    HCT 27.3*  --   --  28.4*  --      --   --  251  --    INR  --   --   --  2.9*  --    < > = values in this interval not displayed. and Blood Culture: No results for input(s): LABBLOO in the last 48 hours.    Significant Imaging: Echocardiogram:   2D echo with color flow doppler:   Results for orders placed or performed during the hospital encounter of 07/18/17   2D echo with color flow doppler   Result Value Ref Range    Est. PA Systolic Pressure 33.87     Tricuspid Valve Regurgitation MILD

## 2017-08-04 NOTE — PROGRESS NOTES
[facility]  Cardiac Electrophysiology  Progress Note    Admission Date: 7/18/2017  Code Status: Prior   Attending Physician: Sunny Downing MD   Expected Discharge Date: 8/16/2017  Principal Problem:Acute on chronic combined systolic and diastolic heart failure    Subjective:     Interval History: no acute events overnight    Review of Systems   Constitution: Negative.   HENT: Negative.    Eyes: Negative.    Cardiovascular: Negative.    Respiratory: Negative.    Endocrine: Negative.    Skin: Negative.    Musculoskeletal: Negative.    Gastrointestinal: Negative.    Genitourinary: Negative.    Neurological: Negative.      Objective:     Vital Signs (Most Recent):  Temp: 97.4 °F (36.3 °C) (08/04/17 0700)  Pulse: 110 (08/04/17 1315)  Resp: (!) 22 (08/04/17 1315)  BP: (!) 85/0 (08/04/17 1100)  SpO2: 98 % (08/04/17 1100) Vital Signs (24h Range):  Temp:  [97.4 °F (36.3 °C)-98.5 °F (36.9 °C)] 97.4 °F (36.3 °C)  Pulse:  [] 110  Resp:  [16-34] 22  SpO2:  [96 %-100 %] 98 %  BP: (78-88)/(0) 85/0  Arterial Line BP: ()/(48-84) 80/64     Weight: 81.2 kg (179 lb 0.2 oz)  Body mass index is 27.22 kg/m².     SpO2: 98 %  O2 Device (Oxygen Therapy): nasal cannula    Physical Exam   Constitutional: He is oriented to person, place, and time. He appears well-developed and well-nourished.   HENT:   Head: Normocephalic and atraumatic.   Eyes: Conjunctivae and EOM are normal. Pupils are equal, round, and reactive to light.   Neck: Normal range of motion. Neck supple. No JVD present.   Cardiovascular:   Smooth vad sounds     Pulmonary/Chest: Effort normal and breath sounds normal. No respiratory distress. He has no wheezes. He has no rales. He exhibits no tenderness.   Abdominal: Soft. Bowel sounds are normal. He exhibits no distension. There is no tenderness.   Musculoskeletal: Normal range of motion. He exhibits no edema or tenderness.   Neurological: He is alert and oriented to person, place, and time.    Skin: Skin is warm and dry. No erythema. No pallor.       Significant Labs: EP:   Recent Labs  Lab 08/03/17  0400  08/03/17  2104 08/04/17  0303 08/04/17  1049     < > 136 135* 135*  135*   K 4.0  < > 4.0 3.7 4.2  4.2   CL 99  < > 100 100 101  101   CO2 22*  < > 25 23 21*  21*   GLU 90  < > 113* 124* 94  94   BUN 45*  < > 44* 45* 48*  48*   CREATININE 2.0*  < > 2.0* 2.0* 2.1*  2.1*   CALCIUM 9.3  < > 8.7 8.7 8.8  8.8   PROT  --   --   --  6.5  --    ALBUMIN  --   --   --  2.9*  --    BILITOT  --   --   --  1.9*  --    ALKPHOS  --   --   --  57  --    AST  --   --   --  19  --    ALT  --   --   --  16  --    ANIONGAP 15  < > 11 12 13  13   ESTGFRAFRICA 38.8*  < > 38.8* 38.8* 36.6*  36.6*   EGFRNONAA 33.5*  < > 33.5* 33.5* 31.6*  31.6*   WBC 9.84  --   --  11.36  --    HGB 9.3*  --   --  9.8*  --    HCT 27.3*  --   --  28.4*  --      --   --  251  --    INR  --   --   --  2.9*  --    < > = values in this interval not displayed. and Blood Culture: No results for input(s): LABBLOO in the last 48 hours.    Significant Imaging: Echocardiogram:   2D echo with color flow doppler:   Results for orders placed or performed during the hospital encounter of 07/18/17   2D echo with color flow doppler   Result Value Ref Range    Est. PA Systolic Pressure 33.87     Tricuspid Valve Regurgitation MILD      Assessment and Plan:   Mr. Hayden is a 67 year old man with advanced NICM (LVEF 10%) on home , HTN, HLD with [employer] CRT-D who presented 7/18 morning with a syncopal episode during a 6MWT followed by an ICD shock x 2. On interrogation, he had been shocked 5 times since 7/14, and 4 times in last two days prior to admit. He is now s/p HMIII LVAD and RV lead on ICD is not pacing.  ICD is currently off.    AICD (automatic cardioverter/defibrillator) present    -bridging to coumadin, INR still 2.9. Would stop heparin at this time  -Patient needs RV lead replacement vs revision given persistent dysfunction  of RV lead  -ICD is off but patient has pads to chest wall in case it is needed  -has frequent afib with PVCs at his baseline and this continues  -Has LVAD (HMIII) in place at this time  -await staff recs for timing of RV lead replacement        AICD discharge    -400mg BID x 2 weeks then likely 400mg daily thereafter  -continue heparin        V-tach    Currently on amiodarone 400mg po BID   Continue Mg and K repletion maintaining K>4, Mg>2            Casey Newman MD  Cardiac Electrophysiology  [facility]

## 2017-08-04 NOTE — ASSESSMENT & PLAN NOTE
-bridging to coumadin, INR still 2.9. Would stop heparin at this time  -Patient needs RV lead replacement vs revision given persistent dysfunction of RV lead  -ICD is off but patient has pads to chest wall in case it is needed  -has frequent afib with PVCs at his baseline and this continues  -Has LVAD (HMIII) in place at this time  -await staff recs for timing of RV lead replacement

## 2017-08-04 NOTE — PROCEDURES
Patient awake with wife  at bedside. VAD interrogation completed this AM in the event changes needed to be made. Will continue to monitor for further issues.     Pulsatile: Yes, intermittent   VAD Sounds: HM3  Smooth  Problems / Issues / Alarms with VAD if any: None noted  HCT:   Modular Cable Connection Intact(no yellow exposed): NO     VAD Interrogation:  TXP LVAD INTERROGATIONS 8/4/2017 8/4/2017 8/4/2017 8/4/2017 8/4/2017 8/4/2017 8/4/2017   Type - - - - - HeartMate3 HeartMate3   Flow 4.3 4.3 4.3 4.0 4.5 3.9 4.0   Speed 5200 5200. 5200 5200 5200 5200 5200   PI 2.9 3.4 3.1 3 2.8 4.6 4.7   Power ([place]) 3.6 3.5 3.6 3.7 3.6 3.6 3.6   LSL - - - - - - 4800   Pulsatility - - - - - - Pulse   }

## 2017-08-04 NOTE — PLAN OF CARE
Problem: Occupational Therapy Goal  Goal: Occupational Therapy Goal  Goals to be met by:  2 weeks 8/12/17     Patient will increase functional independence with ADLs by performing:  Feeding: Independent   UE Dressing with Supervision.  LE Dressing with Supervision.  Grooming while standing with Supervision.  Toileting from toilet with Supervision for hygiene and clothing management.   Stand pivot transfers with Supervision.  Toilet transfer to toilet with Supervision.  Pt will be independent with LVAD yasmin't.      Outcome: Ongoing (interventions implemented as appropriate)  The above goals remain appropriate. DELMY Lucero  8/4/2017

## 2017-08-04 NOTE — PROGRESS NOTES
Daily E and M and VAD Interrogation Note    Reason for Visit:  Patient is seen in follow up for management of:  [] HeartMate II  [] Heartware [] Total artificial heart       [] ECMO           [x] Other - HM III     Interval History:  [] Interval history unobtainable due to intubation.  The [x] implant/[] explant date was 7/27/17    Events - No acute events o/n. Urine output between 100-150/hr - lasix gtt at 20. Cr stable at 2.0. Epi at 0.01, dobutamine remains at 5. Clinically, up in chair this AM. Sill no BM after miralax and Dulcolax. Continued nausea and emesis x 1 this AM - NG tube placed for decompression.      Review of Systems:   Negative except as above.      Medications:  Current Facility-Administered Medications   Medication Dose Route Frequency Provider Last Rate Last Dose    albumin human 5% bottle 500 mL  500 mL Intravenous PRN Shayne Espinoza MD   500 mL at 07/28/17 1755    albuterol nebulizer solution 2.5 mg  2.5 mg Nebulization Q4H PRN Shayne Espinoza MD        amiodarone 360 mg/200 mL (1.8 mg/mL) infusion  0.5 mg/min Intravenous Continuous Shayne Espinoza MD        amlodipine tablet 5 mg  5 mg Oral Daily Shayne Espinoza MD        aspirin suppository 300 mg  300 mg Rectal Daily Shayne Espinoza MD        bisacodyl suppository 10 mg  10 mg Rectal Daily PRN Shayne Espinoza MD   10 mg at 08/02/17 1200    dextrose 5 % and 0.9 % NaCl infusion   Intravenous Continuous Shayne Espinoza MD   Stopped at 08/03/17 0800    dextrose 5 % and 0.9 % NaCl infusion   Intravenous Continuous Shayne Espinoza MD 50 mL/hr at 08/04/17 0600      DOBUtamine 1000 mg in D5W 250 mL infusion (premix non-titrating)  5 mcg/kg/min (Dosing Weight) Intravenous Continuous Shayne Espinoza MD 6 mL/hr at 08/04/17 0700 5 mcg/kg/min at 08/04/17 0700    docusate sodium capsule 200 mg  200 mg Oral QHS Shayne Espinoza MD   200 mg at 08/03/17 2102    EPINEPHrine (ADRENALIN) 4 mg in sodium  chloride 0.9% 250 mL infusion  0.01 mcg/kg/min (Dosing Weight) Intravenous Continuous Shayne Espinzoa MD   Stopped at 08/04/17 0900    fentaNYL injection 50 mcg  50 mcg Intravenous Q4H PRN Shayne Espinoza MD   50 mcg at 07/29/17 0827    ferrous gluconate tablet 324 mg  324 mg Oral Daily with breakfast Shayne Espinoza MD   Stopped at 08/03/17 0745    furosemide (LASIX) 250 mg in sodium chloride 0.9 % 250 mL infusion (non-titrating)  20 mg/hr Intravenous Continuous Edwige Clay MD 20 mL/hr at 08/04/17 0700 20 mg/hr at 08/04/17 0700    heparin 25,000 units in dextrose 5% 250 mL (100 units/mL) infusion (heparin infusion)  400 Units/hr Intravenous Continuous Shayne Espinoza MD   Stopped at 08/04/17 0700    hydrALAZINE injection 10 mg  10 mg Intravenous Q6H PRN Shayne Espinoza MD   10 mg at 08/03/17 2211    magnesium hydroxide 400 mg/5 ml suspension 2,400 mg  30 mL Oral Daily PRN Shayne Espinoza MD        magnesium oxide tablet 400 mg  400 mg Oral TID Shayne Espinoza MD   Stopped at 08/02/17 0600    magnesium sulfate 2g in water 50mL IVPB (premix)  2 g Intravenous PRN Shayne Espinoza MD   2 g at 07/30/17 0731    niCARdipine 40 mg/200 mL infusion  1 mg/hr Intravenous Continuous Shayne Espinoza MD   Stopped at 08/02/17 2244    ondansetron injection 4 mg  4 mg Intravenous Q6H PRN Shayne Espinoza MD   4 mg at 08/04/17 0516    oxycodone immediate release tablet 10 mg  10 mg Oral Q4H PRN Shayne Espinoza MD        oxycodone immediate release tablet 5 mg  5 mg Oral Q4H PRN Shayne Espinoza MD   5 mg at 07/30/17 0817    pantoprazole injection 40 mg  40 mg Intravenous Daily Shayne Espinoza MD        polyethylene glycol packet 17 g  17 g Oral BID Shayne Espinoza MD   Stopped at 08/04/17 0900    potassium chloride 20 mEq in 100 mL IVPB (FOR CENTRAL LINE ADMINISTRATION ONLY)  40 mEq Intravenous PRN Shayne Espinoza MD 50 mL/hr at 08/02/17 0419 40 mEq at  08/02/17 0419    potassium chloride 20 mEq in 100 mL IVPB (FOR CENTRAL LINE ADMINISTRATION ONLY)  40 mEq Intravenous PRN Shayne Espinoza MD 50 mL/hr at 08/04/17 0420 40 mEq at 08/04/17 0420    potassium chloride CR capsule 20 mEq  20 mEq Oral BID Shayne Espinoza MD   Stopped at 08/04/17 0900    pravastatin tablet 20 mg  20 mg Oral QHS Lorena Payton MD   20 mg at 08/03/17 2102    sodium chloride 0.9% flush 10 mL  10 mL Intravenous Q6H Sunny Downing MD   10 mL at 08/04/17 0516    And    sodium chloride 0.9% flush 10 mL  10 mL Intravenous PRN Sunny Downing MD        sodium chloride 0.9% flush 3 mL  3 mL Intravenous Q8H Shayne Espinoza MD   3 mL at 08/04/17 0516    sodium phosphate 15 mmol in dextrose 5 % 250 mL IVPB  15 mmol Intravenous PRN Ewdige Clay MD 83.3 mL/hr at 07/29/17 2240 15 mmol at 07/29/17 2240    sodium phosphate 20.01 mmol in dextrose 5 % 250 mL IVPB  20.01 mmol Intravenous PRN Edwige Clay MD        sodium phosphate 30 mmol in dextrose 5 % 250 mL IVPB  30 mmol Intravenous PRN Edwige Clay MD           Physical Examination:  Vital Signs:   Vitals:    08/04/17 0855   BP:    Pulse: 102   Resp: (!) 25   Temp:      Cardiovascular:  [x] Regular rate and rhythm [] Irregular []  None (MATT) []  Other  []  No edema [x]  Edema present  [x]  Clear to auscultation  []  Rales to []  Coarse  []  No rales but   [] Pedal Pulses absent  [x]  Pulses  + throughout  Skin:  Incision is [x]  Clean, dry and intact.  []  Other   Sternum:  [x]  Stable []  Unstable  Driveline(s):   [x]  Clean, dry and intact. []  Other       Labs:  BMP  Lab Results   Component Value Date     (L) 08/04/2017    K 3.7 08/04/2017     08/04/2017    CO2 23 08/04/2017    BUN 45 (H) 08/04/2017    CREATININE 2.0 (H) 08/04/2017    CALCIUM 8.7 08/04/2017    ANIONGAP 12 08/04/2017    ESTGFRAFRICA 38.8 (A) 08/04/2017    EGFRNONAA 33.5 (A) 08/04/2017       Magnesium   Date Value Ref Range Status    08/04/2017 2.4 1.6 - 2.6 mg/dL Final       Lab Results   Component Value Date    WBC 11.36 08/04/2017    HGB 9.8 (L) 08/04/2017    HCT 28.4 (L) 08/04/2017    MCV 82 08/04/2017     08/04/2017       Lab Results   Component Value Date    INR 2.9 (H) 08/04/2017    INR 1.1 08/02/2017    INR 1.1 08/01/2017       BNP   Date Value Ref Range Status   08/04/2017 1,381 (H) 0 - 99 pg/mL Final     Comment:     Values of less than 100 pg/ml are consistent with non-CHF populations.   08/02/2017 1,398 (H) 0 - 99 pg/mL Final     Comment:     Values of less than 100 pg/ml are consistent with non-CHF populations.   07/31/2017 1,389 (H) 0 - 99 pg/mL Final     Comment:     Values of less than 100 pg/ml are consistent with non-CHF populations.       LD   Date Value Ref Range Status   08/04/2017 289 (H) 110 - 260 U/L Final     Comment:     Results are increased in hemolyzed samples.   08/03/2017 285 (H) 110 - 260 U/L Final     Comment:     Results are increased in hemolyzed samples.   08/02/2017 315 (H) 110 - 260 U/L Final     Comment:     Results are increased in hemolyzed samples.       X-Rays:  [x]  I reviewed today's Chest x-ray    Procedure:  Device Interrogation including analysis of device parameters.  Current Settings   [x]  Ventricular Assist Device  []  Total Artificial Heart interrogated  Review of device function is [x]  Stable []  Other   TXP LVAD INTERROGATIONS 8/4/2017 8/4/2017 8/4/2017 8/4/2017 8/4/2017 8/4/2017 8/4/2017   Type - HeartMate3 HeartMate3 HeartMate3 HeartMate3 HeartMate3 HeartMate3   Flow 4.5 3.9 4.0 3.8 4.3 4.3 4.3   Speed 5200 5200 5200 5200 5200 5200 5200   PI 2.8 4.6 4.7 5.2 3.5 3.5 3.5   Power ([place]) 3.6 3.6 3.6 3.5 3.6 3.5 3.6   LSL - - 4800 4800 4800 4800 4800   Pulsatility - - Pulse Pulse Pulse Pulse Pulse       Assessment:  [x]  Primary Cardiomyopathy []  Congestive Heart Failure   []  Atrial Fibrillation []  Ventricular Tachycardia   []  Aftercare cardiac device []  Long term (current) use  of anticoagulants   []  Ventilator-associated pneumonia []  Pneumonia viral, unspecified   []  Pneumonia, bacterial, unspecified []  Pneumonia, organism unspecified   []  Hemorrhage of GI tract, unspecified    []  Nosebleed []  Driveline infection   []  Infection VAD device []  New onset of    []        Plan:  [x]  Interval history obtained from ICU attending team member during rounding today  []  VAD/MATT teaching performed with patient  []  Mobilization / Physical Therapy ongoing  []  Anticoagulation []  Ongoing []  Held  []  Studies ordered  [x]   To OR today for closure.       Total time spent was 30 minutes.  Of which more than 50 percent of the care dominated counseling and coordinating care with different team members. The VAD was interrogated and all parameters were WNL and no significant findings were found in the history. All these findings are documented in the note above.    Neuro:  - Continue current pain control regimen    Resp:  - ExtubatedOxygen Concentration (%):  [24] 24  - Minimize supplemental O2 requirements  - Pulm toilet  - Nehemias off  - wean off O2    CV:  - HDS; LVAD numbers stable o/n  -  from 285  - Amio 400 mg BID    - Epi to 0.01 - will wean off  - Dobutamine at 5  - HTN    - Norvasc to 5 mg   - PRN hydralazine   -     Heme:  - Hgb/Hct stable  - Continue to trend  - INR therapeutic at 2.9 - hold Coumadin 2/2 inability to absorb given ileus  - Restart heparin at 400, non-titrating    ID:  - Tmax 98.5  - WBC 11.8  - continue to monitor       Renal:  - Singer in place  - Strict I/Os   - Adequate UOP - 100-150 cc/hr o/n.  - Lasix gtt at 20 mg/hr  - Hold diuril   - Cr trending leveled off at 2.0    FEN/GI:  - NPO - strict  - IVF  - Miralax BID  - Replace lytes PRN    Endo:  - Endocrine following, appreciate assistance  - Accuchecks  - Insulin gtt    Dispo:  - Continue ICU care  - Wean off epi  - Awaiting bowel function       Shayne Espinoza MD  General Surgery PGY-2  Pager:  [phone]    Cardiac Surgery Attending E and M (VAD) Note along with VAD Interrogation    I have seen and examined the patient and agree with the findings above    I also reviewed the patients clinical course and:  [x]  Hemodynamic & Respiratory parameters  [x]  Laboratory Data  [x]  Radiological studies     VAD Interrogated [x]      VAD Function is normal. Changes made []  none [x]      Made pt NPO and placed NGT with significant decompression of abdomen. Hold coumadin and start heparin drip at 400 units/hr. Decrease lasix to 10 mg/hr    Interrogation of Ventricular assist device was performed with physician analysis of device parameters and review of device function. I have personally reviewed the interrogation findings and agree with findings as stated.

## 2017-08-04 NOTE — PROGRESS NOTES
"  [facility]  Adult Nutrition  Consult Note    SUMMARY     Recommendations    1. If/when medically appropriate, advance diet as tolerated to cardiac (fluid restriction per MD).   2. If unable to advance diet & parenteral nutrition warranted, recommend Clinimix 5/15 @ 80 mL/hr + 250 mL 20% lipids to meet 100% pt's estimated needs.   = 1863 kcals, 96 g of protein, 1920 mL fluid.  3. RD to monitor & follow-up.    Goals: Meet % EEN, EPN  Nutrition Goal Status: goal not met  Communication of RD Recs: reviewed with RN    Reason for Assessment    Reason for Assessment: RD follow-up  Diagnosis:  (s/p LVAD)  Relevent Medical History: HTN, HLD   Interdisciplinary Rounds: attended     General Information Comments: S/p LVAD. Per RN, pt NPO 2/2 ileus. NGT present to suction. +nausea/vomiting.  Nutrition Discharge Planning: Adequate nutrition    Nutrition Prescription Ordered    Current Diet Order: NPO     Evaluation of Received Nutrients/Fluid Intake    IV Fluid (mL): 1200    Nutrition/Diet History    Patient Reported Diet/Restrictions/Preferences: general, low salt     Factors Affecting Nutritional Intake: NPO, altered gastrointestinal function    Labs/Tests/Procedures/Meds    Pertinent Labs Reviewed: reviewed, pertinent  Pertinent Labs Comments: Na 135, BUN 45, GFR 38.8, Creat 2.0, Gluc 124  Pertinent Medications Reviewed: reviewed, pertinent  Pertinent Medications Comments: Statin, D5 @ 50 mL/hr, Lasix, Heparin, Nicardipine    Physical Findings    Overall Physical Appearance: nourished  Tubes: nasogastric tube  Oral/Mouth Cavity: tooth/teeth missing  Skin: edema, incision    Anthropometrics    Height: 5' 8" (172.7 cm)  Weight Method: Bed Scale   Weight: 81.2 kg (179 lb 0.2 oz)    Ideal Body Weight (IBW), Male: 154 lb  % Ideal Body Weight, Male (lb): 116.25 lb     BMI (Calculated): 27.3  BMI Grade: 25 - 29.9 - overweight    Estimated/Assessed Needs    Weight Used For Calorie Calculations: 81.2 kg (179 " lb 0.2 oz)      Energy Need Method: [place], other (see comments) (7605-4234 kcal/d)     Weight Used For Protein Calculations: 81.2 kg (179 lb 0.2 oz)  Protein Requirements:  g/d     Fluid Need Method: RDA Method, other (see comments) (Per MD or 1 mL/kcal)     Assessment and Plan    Inadequate energy intake r/t inability to consume sufficient energy AEB NPO with no alternate means of nutrition.  Status: New    Monitor and Evaluation    Food and Nutrient Intake: energy intake, food and beverage intake, parenteral nutrition intake  Food and Nutrient Adminstration: diet order, enteral and parenteral nutrition administration     Physical Activity and Function: nutrition-related ADLs and IADLs  Anthropometric Measurements: weight, weight change, body mass index  Biochemical Data, Medical Tests and Procedures: gastrointestinal profile, electrolyte and renal panel, glucose/endocrine profile, lipid profile, inflammatory profile  Nutrition-Focused Physical Findings: overall appearance    Nutrition Risk    Level of Risk: other (see comments) (2x/week)    Nutrition Follow-Up    RD Follow-up?: Yes

## 2017-08-04 NOTE — PROGRESS NOTES
Upon AM assessment Pt appears distressed, belching, with continuous hiccups. Pt reporting increased abdominal discomfort, continuous nausea and vomited 500cc per PM nurse. Pt appears more distended from yesterday's assessment. Notified Dr. Espinoza. Heparin gtt d/c. Orders to place NGT. Spouse and Pt updated on plan of care. Will implement and continue to monitor.

## 2017-08-04 NOTE — PT/OT/SLP PROGRESS
Occupational Therapy  Treatment    Suman Hayden   MRN: [id number]   Admitting Diagnosis: Acute on chronic combined systolic and diastolic heart failure    OT Date of Treatment: 08/04/17   OT Start Time: 0920  OT Stop Time: 0947  OT Total Time (min): 27 min    Billable Minutes:  Therapeutic Activity 27    General Precautions: Standard, sternal, LVAD, fall  Orthopedic Precautions:    Braces:           Subjective:  Communicated with RN prior to session.  No complaints    Pain/Comfort  Pain Rating 1: 0/10  Pain Rating Post-Intervention 1: 0/10    Objective:  Patient found with: arterial line, chest tube, delgado catheter, PICC line, pulse ox (continuous), telemetry, NG tube (LVAD)     Functional Mobility:  Bed Mobility:  Rolling/Turning Right: Moderate assistance  Scooting/Bridging: Moderate Assistance (to EOB)  Supine to Sit: Moderate Assistance    Transfers:        Functional Ambulation: see PT note    Activities of Daily Living:     Feeding adaptive equipment:   UE Dressing Level of Assistance: Minimum assistance  UE adaptive equipment:   LE Dressing Level of Assistance: Total assistance  LE adaptive equipment:   Grooming Position: Seated  Grooming Level of Assistance: Stand by assistance      Therapeutic Activities and Exercises:  Pt seen b/s for ADL and VAD management/education. Pt able to perform Self Test without A and recalled sequence of transferring to battery. Pt/wife cued on recording numbers and checking battery power as well as contents of emergency bag and need to have emergency bag with him at all times. Pt required min A to disconnect power cords and connect to batteries; mod A to organize consolidation bag    AM-PAC 6 CLICK ADL   How much help from another person does this patient currently need?   1 = Unable, Total/Dependent Assistance  2 = A lot, Maximum/Moderate Assistance  3 = A little, Minimum/Contact Guard/Supervision  4 = None, Modified [place]/Independent    Putting on and taking off regular  "lower body clothing? : 1  Bathing (including washing, rinsing, drying)?: 1  Toileting, which includes using toilet, bedpan, or urinal? : 2  Putting on and taking off regular upper body clothing?: 2  Taking care of personal grooming such as brushing teeth?: 3  Eating meals?: 3  Total Score: 12     AM-PAC Raw Score CMS "G-Code Modifier Level of Impairment Assistance   6 % Total / Unable   7 - 8 CM 80 - 100% Maximal Assist   9-13 CL 60 - 80% Moderate Assist   14 - 19 CK 40 - 60% Moderate Assist   20 - 22 CJ 20 - 40% Minimal Assist   23 CI 1-20% SBA / CGA   24 CH 0% Independent/ Mod I       Patient left EOB with RN for CT dressing care with all lines intact, call button in reach and rn present    ASSESSMENT:  Suman Hayden is a 67 y.o. male with a medical diagnosis of Acute on chronic combined systolic and diastolic heart failure and presents with weakness and decreased endurance affecting ADL (I).    Rehab identified problem list/impairments: Rehab identified problem list/impairments: weakness, impaired endurance, impaired self care skills, impaired balance, gait instability, impaired functional mobilty, impaired cardiopulmonary response to activity, impaired coordination, decreased upper extremity function    Rehab potential is good.    Activity tolerance: Good    Discharge recommendations: Discharge Facility/Level Of Care Needs: home with home health     Barriers to discharge: Barriers to Discharge: None    Equipment recommendations: none     GOALS:    Occupational Therapy Goals        Problem: Occupational Therapy Goal    Goal Priority Disciplines Outcome Interventions   Occupational Therapy Goal     OT, PT/OT Ongoing (interventions implemented as appropriate)    Description:  Goals to be met by:  2 weeks 8/12/17     Patient will increase functional independence with ADLs by performing:  Feeding: Independent   UE Dressing with Supervision.  LE Dressing with Supervision.  Grooming while standing with " Supervision.  Toileting from toilet with Supervision for hygiene and clothing management.   Stand pivot transfers with Supervision.  Toilet transfer to toilet with Supervision.  Pt will be independent with LVAD yasmin't.                 Multidisciplinary Problems (Resolved)        Problem: Occupational Therapy Goal    Goal Priority Disciplines Outcome Interventions   Occupational Therapy Goal   (Resolved)     OT, PT/OT  Error    Description:  Goals to be met by: 8/04/2017    Patient will increase functional independence with ADLs by performing:    Increased functional strength to 5/5 for improved ADL performance.  Upper extremity exercise program and R LE ankle pumps  3x 10 reps per handout, with independence.                      Plan:  Patient to be seen 6 x/week to address the above listed problems via self-care/home management, therapeutic activities, therapeutic exercises  Plan of Care expires: 08/04/17  Plan of Care reviewed with: patient, spouse         Sammi DELMY Malloy  08/04/2017

## 2017-08-04 NOTE — PROGRESS NOTES
Update Note:    SW to pt's room for update. Pt asleep in chair. Pt's wife and niece present, aaox4, calm, and pleasant. Pt's wife reports she and pt are coping well at this time. Pt's wife reports last night was difficulty, and she became tearful at times, and is feeling better this morning with prayer and family support. SW providing emotional support and counseling. SW encouraging self care. Pt's wife reports no questions or concerns for SW at this time. SW providing ongoing psychosocial counseling and support, education, assistance, resources, and discharge planning as indicated. SW continuing to follow and remains available.

## 2017-08-04 NOTE — PROGRESS NOTES
Pt on 1L NC. Tolerating well. AAOx4. Following commands. Nods/gestures appropriately. Moving all extremities freely/equally. Denies pain. Plan of care reviewed with pt. Questions answered per ICU RN. See flowsheet for details.

## 2017-08-04 NOTE — PROGRESS NOTES
Progress Note  Surgical Intensive Care    Admit Date: 7/18/2017  Post-operative Day: 7 Days Post-Op  Hospital Day: 18    SUBJECTIVE:     Follow-up For:  Procedure(s) (LRB):  CLOSURE-STERNAL WOUND (N/A)  PLACEMENT-NEOPERICARDIUM (N/A)   S/p sternotomy closure 7/18/17    Interval history: Pt. Reports persistence of N/V overnight. Downtitration of epi, remians on dobutamine. Nitric has been discontinued. Lasix and amio gtt stable. Pt. Out of bed to chair, working with PT.     Continuous Infusions:   dextrose 5 % and 0.9 % NaCl Stopped (08/03/17 0800)    dextrose 5 % and 0.9 % NaCl 50 mL/hr at 08/04/17 0500    DOBUTamine 5 mcg/kg/min (08/04/17 0500)    epinephrine infusion (NON-TITRATING) 0.01 mcg/kg/min (08/04/17 0500)    furosemide (LASIX) 1 mg/mL infusion (non-titrating) 20 mg/hr (08/04/17 0500)    heparin (porcine) in 5 % dex 1,200 Units/hr (08/04/17 0500)    nicardipine Stopped (08/02/17 2244)     Scheduled Meds:   amiodarone  400 mg Oral BID    amlodipine  5 mg Oral Daily    aspirin  325 mg Oral Daily    docusate sodium  200 mg Oral QHS    ferrous gluconate  324 mg Oral Daily with breakfast    magnesium oxide  400 mg Oral TID    pantoprazole  40 mg Oral Daily    polyethylene glycol  17 g Oral BID    potassium chloride  20 mEq Oral BID    pravastatin  20 mg Oral QHS    sodium chloride 0.9%  10 mL Intravenous Q6H    sodium chloride 0.9%  3 mL Intravenous Q8H     PRN Meds:albumin human 5%, albuterol sulfate, bisacodyl, fentaNYL, hydrALAZINE, magnesium hydroxide 400 mg/5 ml, magnesium sulfate IVPB, ondansetron, oxycodone, oxycodone, potassium chloride, potassium chloride, Flushing PICC Protocol **AND** sodium chloride 0.9% **AND** sodium chloride 0.9%, sodium phosphate IVPB, sodium phosphate IVPB, sodium phosphate IVPB    Review of patient's allergies indicates:  No Known Allergies    OBJECTIVE:     Vital Signs (Most Recent)  Temp: 98.5 °F (36.9 °C) (08/04/17 0300)  Pulse: 100 (08/04/17 0500)  Resp:  (!) 25 (08/04/17 0500)  BP: (!) 78/0 (08/03/17 2300)  SpO2: 97 % (08/04/17 0500)    Vital Signs Range (Last 24H):  Temp:  [97.9 °F (36.6 °C)-98.5 °F (36.9 °C)]   Pulse:  []   Resp:  [16-41]   BP: (78-88)/(0)   SpO2:  [95 %-100 %]   Arterial Line BP: ()/(48-84)     I & O (Last 24H):    Intake/Output Summary (Last 24 hours) at 08/04/17 0539  Last data filed at 08/04/17 0500   Gross per 24 hour   Intake             2048 ml   Output             2641 ml   Net             -593 ml     Ventilator Data (Last 24H):     Oxygen Concentration (%):  [24] 24    Hemodynamic Parameters (Last 24H):       Physical Exam   Constitutional: He appears well-developed and well-nourished. No distress. Resting comfortably   HENT:   Head: Normocephalic and atraumatic.   Eyes: Right eye exhibits no discharge. Left eye exhibits no discharge. No scleral icterus.   Neck: Normal range of motion. Neck supple.   Cardiovascular: Intact distal pulses.    LVAD hum present.   Pulmonary/Chest: Effort normal. No respiratory distress. NC 3L  R and L meds chest tubes in place- minimal serosanguinous drainage.  Bandage over sternotomy incision   Abdominal: Soft. He exhibits no distension.   Skin: Skin is warm and dry.     Laboratory (Last 24H):  CBC:    Recent Labs  Lab 08/04/17  0303   WBC 11.36   HGB 9.8*   HCT 28.4*        CMP:    Recent Labs  Lab 08/04/17  0303   CALCIUM 8.7   ALBUMIN 2.9*   PROT 6.5   *   K 3.7   CO2 23      BUN 45*   CREATININE 2.0*   ALKPHOS 57   ALT 16   AST 19   BILITOT 1.9*       ASSESSMENT/PLAN:     Neuro:   - AAOx3  -off sedation  -continue current pain mgmt     Pulmonary:   -on 3L NC- sating well  -Nehemias d/c 8/2/17  -Chest tubes - R med and 2 L meds. Minimal output     Cardiac:  -MAP range goal >65  -Dobutamine gtt 5  -Epi 0.03  -currently on cardene drip at 5    Renal:   -Singer in place  -Bun/Cr stable 31/1.1  -UOP >100cc/hr  -lasix gtt 20     Fluids/Electrolytes/Nutrition/GI:   -NGT for  meds  -replace lytes PRN     Hematology/Oncology:  -on heparin gtt  -Aspirin 325mg     Infectious Disease:   -Afebrile   -WBC 13.9  -cefepime  -Cultures NGTD    Endocrine:  -endocrine following  -bg goal 140-180  -off insulin gtt     Dispo:  -Continue care in the ICU setting. Out of bed and in chair and PT    Avery Crystal  CA2  P: [phone]

## 2017-08-04 NOTE — PROGRESS NOTES
Updated Dr. Espinoza on Pt VS, assessment, labs, UO, and NGT output. Heparin restarted at 400 units. Lasix decreased to 15mg/hr. NGT 800cc thick liquid output, patency ensured. UO trending down ~40cc/hr. BP stable, Norvasc held this morning with nausea and NGT insertion. No issues with VAD flows at this time. Large amounts of SS fluid leaking from CT site, dressing changed. Pt ambulated in hallways 98 ft, tolerated fairly well. Pt remains OOBTC, VSS. Will continue to monitor.

## 2017-08-04 NOTE — PROGRESS NOTES
Reviewed plan of care with Pt and spouse. Pt remains on Dobutamine, epi, heparin, lasix, and IVF infusing per MD order. KUB obtained for belching, abdominal distension. Pt now NPO except meds, clears for ileus, Pt and spouse verbalized understanding of diet changes. No issues with VAD today, see flow sheet for further documentation. UO excellent. LVAD dressing changed with spouse observing, site CDI. Pt tolerated walking in hallways, being OOBTC all day. VSS at this time, will continue to monitor.

## 2017-08-04 NOTE — PT/OT/SLP PROGRESS
Physical Therapy  Treatment    Suman Hayden   MRN: [id number]   Admitting Diagnosis: Acute on chronic combined systolic and diastolic heart failure    PT Received On: 08/04/17  PT Start Time: 0951     PT Stop Time: 1037    PT Total Time (min): 46 min       Billable Minutes:  Gait [id number] min and Therapeutic Activity 26 min    Treatment Type: Treatment  PT/PTA: PT     PTA Visit Number: 0       General Precautions: Standard, fall, LVAD, sternal  Orthopedic Precautions: N/A   Braces:      Do you have any cultural, spiritual, Gnosticist conflicts, given your current situation?: none noted     Subjective:  Communicated with nurse prior to session.      Pain/Comfort  Pain Rating 1: 0/10  Pain Rating Post-Intervention 1: 0/10    Objective:   Patient found with: arterial line, telemetry, peripheral IV, NG tube, delgado catheter, oxygen, SCD (LVAD, B Rich Koby boots)    Functional Mobility:  Bed Mobility:   Rolling/Turning to Left:  (not tested. pt was sitting EOB with OT.)    Transfers:  Sit <> Stand Assistance: Moderate Assistance (pt needed verbal cues for functional mobility with sternal precautions. pt stood x 4 reps.)    Gait:   Gait Distance: 64 ft, 32 ft. pt had sitting rest period between distance ambulated.  pt had emergency bag present and nursing with portable monitor. pt on O2 with gait training.   Assistance 1: total assist ( min assist of 1 and additional assist of 2 for equipment)  Gait Assistive Device:  (liven good RW)  Gait Pattern: 3-point gait    Therapeutic Activities and Exercises:  Pt was min assist loosening Black LVAD cable to switch from battery to power base. Pt was SBA for sequencing and technique.      AM-PAC 6 CLICK MOBILITY  How much help from another person does this patient currently need?   1 = Unable, Total/Dependent Assistance  2 = A lot, Maximum/Moderate Assistance  3 = A little, Minimum/Contact Guard/Supervision  4 = None, Modified [place]/Independent    Turning over in bed (including  adjusting bedclothes, sheets and blankets)?: 2  Sitting down on and standing up from a chair with arms (e.g., wheelchair, bedside commode, etc.): 2  Moving from lying on back to sitting on the side of the bed?: 2  Moving to and from a bed to a chair (including a wheelchair)?: 3  Need to walk in hospital room?: 2  Climbing 3-5 steps with a railing?: 1  Total Score: 12    AM-PAC Raw Score CMS G-Code Modifier Level of Impairment Assistance   6 % Total / Unable   7 - 9 CM 80 - 100% Maximal Assist   10 - 14 CL 60 - 80% Moderate Assist   15 - 19 CK 40 - 60% Moderate Assist   20 - 22 CJ 20 - 40% Minimal Assist   23 CI 1-20% SBA / CGA   24 CH 0% Independent/ Mod I     Patient left up in chair with all lines intact, call button in reach and wife.  present.    Assessment:  Suman Hayden is a 67 y.o. 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 Pt would benefit from cont PT to address deficits and will need HHPT. Pt will benefit from skilled PT 6x/wk to return pt to Independent status. Pt is s/p LVAD HM3 7/27, chest closed 7/28/17.     Rehab identified problem list/impairments: Rehab identified problem list/impairments: weakness, impaired functional mobilty, gait instability, impaired balance, decreased lower extremity function    Rehab potential is good.    Activity tolerance: Good    Discharge recommendations: Discharge Facility/Level Of Care Needs: home health PT     Barriers to discharge: Barriers to Discharge: None    Equipment recommendations: Equipment Needed After Discharge: none     GOALS:    Physical Therapy Goals        Problem: Physical Therapy Goal    Goal Priority Disciplines Outcome Goal Variances Interventions   Physical Therapy Goal     PT/OT, PT Ongoing (interventions implemented as appropriate)     Description:  Goals to be met by: 8/12/17     Patient will  increase functional independence with mobility by performin. Supine to sit with MInimal Assistance- not met  2. Sit to supine with MInimal Assistance - not met  3. Sit to stand transfer with Minimal Assistance- not met  4. Bed to chair transfer with Minimal Assistance- not met  5. Gait  x 50 feet with Minimal Assistance. - not met  6. Lower extremity exercise program x15 reps, with supervision, in order to increase LE strength and (I) with functional mobility. - not met                       PLAN:    Patient to be seen 6 x/week  to address the above listed problems via gait training, therapeutic exercises, therapeutic activities  Plan of Care expires: 17  Plan of Care reviewed with: patient, spouse         Astridmichael Whaley, PT  2017

## 2017-08-04 NOTE — SUBJECTIVE & OBJECTIVE
Interval History: patient with NG tube now for ileus, still with nausea     Continuous Infusions:   amiodarone      dextrose 5 % and 0.9 % NaCl 75 mL/hr at 08/04/17 1300    DOBUTamine 5 mcg/kg/min (08/04/17 1300)    epinephrine infusion (NON-TITRATING) Stopped (08/04/17 0900)    furosemide (LASIX) 1 mg/mL infusion (non-titrating) 15 mg/hr (08/04/17 1300)    heparin (porcine) in 5 % dex 400 Units/hr (08/04/17 1300)    nicardipine Stopped (08/02/17 2244)     Scheduled Meds:   amlodipine  5 mg Oral Daily    aspirin  300 mg Rectal Daily    docusate sodium  200 mg Oral QHS    ferrous gluconate  324 mg Oral Daily with breakfast    magnesium oxide  400 mg Oral TID    pantoprazole  40 mg Intravenous Daily    polyethylene glycol  17 g Oral BID    potassium chloride  20 mEq Oral BID    pravastatin  20 mg Oral QHS    sodium chloride 0.9%  10 mL Intravenous Q6H    sodium chloride 0.9%  3 mL Intravenous Q8H     PRN Meds:albumin human 5%, albuterol sulfate, bisacodyl, fentaNYL, hydrALAZINE, magnesium hydroxide 400 mg/5 ml, magnesium sulfate IVPB, ondansetron, oxycodone, oxycodone, potassium chloride, potassium chloride, Flushing PICC Protocol **AND** sodium chloride 0.9% **AND** sodium chloride 0.9%, sodium phosphate IVPB, sodium phosphate IVPB, sodium phosphate IVPB    Review of patient's allergies indicates:  No Known Allergies  Objective:     Vital Signs (Most Recent):  Temp: 97.4 °F (36.3 °C) (08/04/17 0700)  Pulse: 110 (08/04/17 1315)  Resp: (!) 22 (08/04/17 1315)  BP: (!) 85/0 (08/04/17 1100)  SpO2: 98 % (08/04/17 1100) Vital Signs (24h Range):  Temp:  [97.4 °F (36.3 °C)-98.5 °F (36.9 °C)] 97.4 °F (36.3 °C)  Pulse:  [] 110  Resp:  [16-34] 22  SpO2:  [96 %-100 %] 98 %  BP: (78-88)/(0) 85/0  Arterial Line BP: ()/(48-84) 80/64     Weight: 81.2 kg (179 lb 0.2 oz)  Body mass index is 27.22 kg/m².      Intake/Output Summary (Last 24 hours) at 08/04/17 1331  Last data filed at 08/04/17 1300   Gross  per 24 hour   Intake             1998 ml   Output             2307 ml   Net             -309 ml       Hemodynamic Parameters:           Physical Exam   Constitutional: He appears well-developed and well-nourished. No distress.   HENT:   Head: Normocephalic and atraumatic.   Eyes: Pupils are equal, round, and reactive to light.   Neck: Normal range of motion. Neck supple. No JVD present.   Cardiovascular: Normal rate and regular rhythm.  Exam reveals no gallop and no friction rub.    No murmur heard.  VAD hum smooth   Pulmonary/Chest: Effort normal. No respiratory distress. He has no wheezes. He has no rales.   Abdominal: Soft. He exhibits distension (mild). There is no tenderness. There is no guarding.   Musculoskeletal: Normal range of motion. He exhibits no edema.   Neurological: He is alert. No cranial nerve deficit. Coordination normal.   Skin: Skin is warm. He is not diaphoretic. No erythema.   Vitals reviewed.      Significant Labs:  CBC:    Recent Labs  Lab 08/04/17  0303   WBC 11.36   RBC 3.48*   HGB 9.8*   HCT 28.4*      MCV 82   MCH 28.2   MCHC 34.5     BNP:    Recent Labs  Lab 08/04/17  0303   BNP 1,381*     CMP:    Recent Labs  Lab 08/04/17  0303 08/04/17  1049   * 94  94   CALCIUM 8.7 8.8  8.8   ALBUMIN 2.9*  --    PROT 6.5  --    * 135*  135*   K 3.7 4.2  4.2   CO2 23 21*  21*    101  101   BUN 45* 48*  48*   CREATININE 2.0* 2.1*  2.1*   ALKPHOS 57  --    ALT 16  --    AST 19  --    BILITOT 1.9*  --       Coagulation:     Recent Labs  Lab 08/04/17  0303 08/04/17  1049   INR 2.9*  --    APTT 59.6* 28.7     LDH:    Recent Labs  Lab 08/02/17  0310 08/03/17  0400 08/04/17  0303   * 285* 289*     Microbiology:  Microbiology Results (last 7 days)     Procedure Component Value Units Date/Time    Blood culture [[id number]] Collected:  07/24/17 1300    Order Status:  Completed Specimen:  Blood from Peripheral, Hand, Right Updated:  07/29/17 1812     Blood Culture, Routine  No growth after 5 days.    Urine culture [[id number]]  (Susceptibility) Collected:  07/24/17 1139    Order Status:  Completed Specimen:  Urine from Urine, Clean Catch Updated:  07/29/17 1506     Urine Culture, Routine --     ENTEROCOCCUS FAECALIS  >100,000 cfu/ml  No other significant isolate      Blood culture [[id number]] Collected:  07/24/17 1100    Order Status:  Completed Specimen:  Blood from Peripheral, Antecubital, Left Updated:  07/29/17 1412     Blood Culture, Routine No growth after 5 days.          I have reviewed all pertinent labs within the past 24 hours.    Estimated Creatinine Clearance: 33 mL/min (based on Cr of 2.1).    Diagnostic Results:  I have reviewed and interpreted all pertinent imaging results/findings within the past 24 hours.

## 2017-08-05 LAB
ALLENS TEST: ABNORMAL
ANION GAP SERPL CALC-SCNC: 10 MMOL/L
ANION GAP SERPL CALC-SCNC: 10 MMOL/L
ANION GAP SERPL CALC-SCNC: 12 MMOL/L
ANION GAP SERPL CALC-SCNC: 12 MMOL/L
ANION GAP SERPL CALC-SCNC: 13 MMOL/L
APTT BLDCRRT: 35.9 SEC
APTT BLDCRRT: 36.8 SEC
APTT BLDCRRT: 38.9 SEC
BACTERIA UR CULT: NO GROWTH
BASOPHILS # BLD AUTO: 0.01 K/UL
BASOPHILS # BLD AUTO: 0.01 K/UL
BASOPHILS NFR BLD: 0.1 %
BASOPHILS NFR BLD: 0.1 %
BUN SERPL-MCNC: 42 MG/DL
BUN SERPL-MCNC: 43 MG/DL
BUN SERPL-MCNC: 44 MG/DL
BUN SERPL-MCNC: 44 MG/DL
BUN SERPL-MCNC: 45 MG/DL
CALCIUM SERPL-MCNC: 8.4 MG/DL
CALCIUM SERPL-MCNC: 8.6 MG/DL
CALCIUM SERPL-MCNC: 8.6 MG/DL
CHLORIDE SERPL-SCNC: 103 MMOL/L
CHLORIDE SERPL-SCNC: 103 MMOL/L
CHLORIDE SERPL-SCNC: 105 MMOL/L
CO2 SERPL-SCNC: 21 MMOL/L
CO2 SERPL-SCNC: 23 MMOL/L
CREAT SERPL-MCNC: 1.8 MG/DL
CREAT SERPL-MCNC: 1.8 MG/DL
CREAT SERPL-MCNC: 1.9 MG/DL
DELSYS: ABNORMAL
DIFFERENTIAL METHOD: ABNORMAL
DIFFERENTIAL METHOD: ABNORMAL
EOSINOPHIL # BLD AUTO: 0.1 K/UL
EOSINOPHIL # BLD AUTO: 0.1 K/UL
EOSINOPHIL NFR BLD: 1 %
EOSINOPHIL NFR BLD: 1.2 %
ERYTHROCYTE [DISTWIDTH] IN BLOOD BY AUTOMATED COUNT: 15.8 %
ERYTHROCYTE [DISTWIDTH] IN BLOOD BY AUTOMATED COUNT: 15.8 %
EST. GFR  (AFRICAN AMERICAN): 41.3 ML/MIN/1.73 M^2
EST. GFR  (AFRICAN AMERICAN): 44 ML/MIN/1.73 M^2
EST. GFR  (AFRICAN AMERICAN): 44 ML/MIN/1.73 M^2
EST. GFR  (NON AFRICAN AMERICAN): 35.7 ML/MIN/1.73 M^2
EST. GFR  (NON AFRICAN AMERICAN): 38.1 ML/MIN/1.73 M^2
EST. GFR  (NON AFRICAN AMERICAN): 38.1 ML/MIN/1.73 M^2
GLUCOSE SERPL-MCNC: 112 MG/DL
GLUCOSE SERPL-MCNC: 114 MG/DL
GLUCOSE SERPL-MCNC: 115 MG/DL
GLUCOSE SERPL-MCNC: 115 MG/DL
GLUCOSE SERPL-MCNC: 205 MG/DL
HCO3 UR-SCNC: 24.6 MMOL/L (ref 24–28)
HCT VFR BLD AUTO: 26.4 %
HCT VFR BLD AUTO: 26.4 %
HGB BLD-MCNC: 9 G/DL
HGB BLD-MCNC: 9.1 G/DL
INR PPP: 3.7
LDH SERPL L TO P-CCNC: 278 U/L
LYMPHOCYTES # BLD AUTO: 1.2 K/UL
LYMPHOCYTES # BLD AUTO: 1.3 K/UL
LYMPHOCYTES NFR BLD: 11.1 %
LYMPHOCYTES NFR BLD: 9.7 %
MAGNESIUM SERPL-MCNC: 2.4 MG/DL
MAGNESIUM SERPL-MCNC: 2.5 MG/DL
MAGNESIUM SERPL-MCNC: 2.5 MG/DL
MCH RBC QN AUTO: 27.8 PG
MCH RBC QN AUTO: 27.9 PG
MCHC RBC AUTO-ENTMCNC: 34.1 G/DL
MCHC RBC AUTO-ENTMCNC: 34.5 G/DL
MCV RBC AUTO: 81 FL
MCV RBC AUTO: 82 FL
MONOCYTES # BLD AUTO: 0.8 K/UL
MONOCYTES # BLD AUTO: 0.9 K/UL
MONOCYTES NFR BLD: 6.1 %
MONOCYTES NFR BLD: 8.2 %
NEUTROPHILS # BLD AUTO: 11.2 K/UL
NEUTROPHILS # BLD AUTO: 8.5 K/UL
NEUTROPHILS NFR BLD: 79 %
NEUTROPHILS NFR BLD: 82.7 %
PCO2 BLDA: 29.6 MMHG (ref 35–45)
PH SMN: 7.53 [PH] (ref 7.35–7.45)
PHOSPHATE SERPL-MCNC: 3.6 MG/DL
PLATELET # BLD AUTO: 269 K/UL
PLATELET # BLD AUTO: 317 K/UL
PMV BLD AUTO: 10.7 FL
PMV BLD AUTO: 10.9 FL
PO2 BLDA: 121 MMHG (ref 80–100)
POC BE: 2 MMOL/L
POC SATURATED O2: 99 % (ref 95–100)
POC TCO2: 25 MMOL/L (ref 23–27)
POTASSIUM SERPL-SCNC: 3.6 MMOL/L
POTASSIUM SERPL-SCNC: 3.6 MMOL/L
POTASSIUM SERPL-SCNC: 3.7 MMOL/L
POTASSIUM SERPL-SCNC: 3.8 MMOL/L
POTASSIUM SERPL-SCNC: 4 MMOL/L
PROTHROMBIN TIME: 37.4 SEC
RBC # BLD AUTO: 3.23 M/UL
RBC # BLD AUTO: 3.27 M/UL
SAMPLE: ABNORMAL
SITE: ABNORMAL
SODIUM SERPL-SCNC: 138 MMOL/L
SODIUM SERPL-SCNC: 139 MMOL/L
WBC # BLD AUTO: 10.81 K/UL
WBC # BLD AUTO: 13.53 K/UL

## 2017-08-05 PROCEDURE — 82803 BLOOD GASES ANY COMBINATION: CPT

## 2017-08-05 PROCEDURE — 99233 SBSQ HOSP IP/OBS HIGH 50: CPT | Mod: 24,,, | Performed by: THORACIC SURGERY (CARDIOTHORACIC VASCULAR SURGERY)

## 2017-08-05 PROCEDURE — A4216 STERILE WATER/SALINE, 10 ML: HCPCS | Performed by: STUDENT IN AN ORGANIZED HEALTH CARE EDUCATION/TRAINING PROGRAM

## 2017-08-05 PROCEDURE — 80048 BASIC METABOLIC PNL TOTAL CA: CPT | Mod: 91

## 2017-08-05 PROCEDURE — 99233 SBSQ HOSP IP/OBS HIGH 50: CPT | Mod: GC,,, | Performed by: ANESTHESIOLOGY

## 2017-08-05 PROCEDURE — 83735 ASSAY OF MAGNESIUM: CPT | Mod: 91

## 2017-08-05 PROCEDURE — 63600175 PHARM REV CODE 636 W HCPCS: Performed by: STUDENT IN AN ORGANIZED HEALTH CARE EDUCATION/TRAINING PROGRAM

## 2017-08-05 PROCEDURE — 25000003 PHARM REV CODE 250: Performed by: STUDENT IN AN ORGANIZED HEALTH CARE EDUCATION/TRAINING PROGRAM

## 2017-08-05 PROCEDURE — 20000000 HC ICU ROOM

## 2017-08-05 PROCEDURE — 94761 N-INVAS EAR/PLS OXIMETRY MLT: CPT

## 2017-08-05 PROCEDURE — 25000003 PHARM REV CODE 250: Performed by: THORACIC SURGERY (CARDIOTHORACIC VASCULAR SURGERY)

## 2017-08-05 PROCEDURE — 93750 INTERROGATION VAD IN PERSON: CPT | Mod: ,,, | Performed by: THORACIC SURGERY (CARDIOTHORACIC VASCULAR SURGERY)

## 2017-08-05 PROCEDURE — 63600175 PHARM REV CODE 636 W HCPCS: Performed by: THORACIC SURGERY (CARDIOTHORACIC VASCULAR SURGERY)

## 2017-08-05 PROCEDURE — 37799 UNLISTED PX VASCULAR SURGERY: CPT

## 2017-08-05 PROCEDURE — 27000248 HC VAD-ADDITIONAL DAY

## 2017-08-05 PROCEDURE — 99233 SBSQ HOSP IP/OBS HIGH 50: CPT | Mod: GC,,, | Performed by: INTERNAL MEDICINE

## 2017-08-05 PROCEDURE — 85610 PROTHROMBIN TIME: CPT

## 2017-08-05 PROCEDURE — 84100 ASSAY OF PHOSPHORUS: CPT

## 2017-08-05 PROCEDURE — 85730 THROMBOPLASTIN TIME PARTIAL: CPT | Mod: 91

## 2017-08-05 PROCEDURE — A4216 STERILE WATER/SALINE, 10 ML: HCPCS | Performed by: THORACIC SURGERY (CARDIOTHORACIC VASCULAR SURGERY)

## 2017-08-05 PROCEDURE — C9113 INJ PANTOPRAZOLE SODIUM, VIA: HCPCS | Performed by: STUDENT IN AN ORGANIZED HEALTH CARE EDUCATION/TRAINING PROGRAM

## 2017-08-05 PROCEDURE — 27000221 HC OXYGEN, UP TO 24 HOURS

## 2017-08-05 PROCEDURE — 83615 LACTATE (LD) (LDH) ENZYME: CPT

## 2017-08-05 PROCEDURE — 85025 COMPLETE CBC W/AUTO DIFF WBC: CPT

## 2017-08-05 RX ADMIN — POTASSIUM CHLORIDE 40 MEQ: 200 INJECTION, SOLUTION INTRAVENOUS at 12:08

## 2017-08-05 RX ADMIN — DOBUTAMINE HYDROCHLORIDE 5 MCG/KG/MIN: 400 INJECTION INTRAVENOUS at 12:08

## 2017-08-05 RX ADMIN — HYDRALAZINE HYDROCHLORIDE 10 MG: 20 INJECTION INTRAMUSCULAR; INTRAVENOUS at 12:08

## 2017-08-05 RX ADMIN — PANTOPRAZOLE SODIUM 40 MG: 40 INJECTION, POWDER, FOR SOLUTION INTRAVENOUS at 10:08

## 2017-08-05 RX ADMIN — Medication 3 ML: at 02:08

## 2017-08-05 RX ADMIN — AMIODARONE HYDROCHLORIDE 0.5 MG/MIN: 1.8 INJECTION, SOLUTION INTRAVENOUS at 02:08

## 2017-08-05 RX ADMIN — Medication 10 ML: at 11:08

## 2017-08-05 RX ADMIN — POTASSIUM CHLORIDE 20 MEQ: 200 INJECTION, SOLUTION INTRAVENOUS at 06:08

## 2017-08-05 RX ADMIN — Medication 10 ML: at 12:08

## 2017-08-05 RX ADMIN — Medication 3 ML: at 06:08

## 2017-08-05 RX ADMIN — AMIODARONE HYDROCHLORIDE 0.5 MG/MIN: 1.8 INJECTION, SOLUTION INTRAVENOUS at 01:08

## 2017-08-05 RX ADMIN — HEPARIN SODIUM AND DEXTROSE 400 UNITS/HR: 10000; 5 INJECTION INTRAVENOUS at 01:08

## 2017-08-05 RX ADMIN — Medication 10 ML: at 06:08

## 2017-08-05 RX ADMIN — ASPIRIN 300 MG: 300 SUPPOSITORY RECTAL at 10:08

## 2017-08-05 RX ADMIN — Medication 3 ML: at 09:08

## 2017-08-05 RX ADMIN — POTASSIUM CHLORIDE 40 MEQ: 200 INJECTION, SOLUTION INTRAVENOUS at 11:08

## 2017-08-05 RX ADMIN — FUROSEMIDE 10 MG/HR: 10 INJECTION, SOLUTION INTRAMUSCULAR; INTRAVENOUS at 03:08

## 2017-08-05 NOTE — PLAN OF CARE
Problem: Patient Care Overview  Goal: Plan of Care Review  Outcome: Ongoing (interventions implemented as appropriate)  Dx: Pt is POD 9 s/p LVAD implant (HM 3)    Shift Events: Ambulated in hallway x2, educated on switching from battery to PM, delgado d/c    Neuro: AAO x4, follows commands, moves all extremities    Vital Signs: VSS throughout shift. MAP goal 60-80, doppler pressures correlate w/ SBP. 2L per NC, sats 100%    Intake: Gtts: Amio, Lasix, Dobutamine, MIVF, Heparin    Output:  cc/hr - Pt voids w/o difficulty. Chest tube to -20 mmHg suction, total output 100 cc/shift. NG to LIWS, total output 200 cc/shift.    VAD: Speed 5200 (lsl 4800), flows 3.8-4.2, PI 3-6, Joyner 5-6    Pain Management: Pt denies pain throughout shift     Labs: BMP, Mag, q6hr - lytes replaced as ordered.    Skin: CDI - heels, elbows, and sacrum w/o redness or breakdown. Midline sternotomy incision open to air - CDI, edges approximated, steri-strips intact. Chest tube insertion site covered with gauze - dsg CDI. VAD dsg changed - Driveline site CDI.

## 2017-08-05 NOTE — PROGRESS NOTES
Notified Dr. Serrato of persistent hypotension and AMS. Epi restarted to keep MAP > 60. MD to bedside to assess. New orders received and implemented. Labs reported, WBC elevated, INR elevated from AM labs. Pan cultures completed. MD notified of copious amounts of drainage from around CT site, increased NGT output.  RN remains at bedside. Will continue to monitor.

## 2017-08-05 NOTE — PROGRESS NOTES
Progress Note  Surgical Intensive Care    Admit Date: 7/18/2017  Post-operative Day: 8 Days Post-Op  Hospital Day: 19    SUBJECTIVE:     Follow-up For:  Procedure(s) (LRB):  CLOSURE-STERNAL WOUND (N/A)  PLACEMENT-NEOPERICARDIUM (N/A)   S/p sternotomy closure 7/18/17    Interval history: No acute events overnight, Off Epi and Cardene gtt this morning. Continues to be on Amio, Heparin, Furosemide, Dobutamine gtt. Chest tube with minimal serosanguinous output overnight. UOP adequate. Tolerating NC this morning and seen out of bed in chair.     Continuous Infusions:   amiodarone 0.5 mg/min (08/05/17 1000)    dextrose 5 % and 0.9 % NaCl 80 mL/hr at 08/05/17 1000    DOBUTamine 5 mcg/kg/min (08/05/17 1000)    epinephrine infusion (NON-TITRATING) Stopped (08/04/17 1800)    furosemide (LASIX) 1 mg/mL infusion (non-titrating) 10 mg/hr (08/05/17 1000)    heparin (porcine) in 5 % dex 400 Units/hr (08/05/17 1000)    nicardipine Stopped (08/05/17 0600)     Scheduled Meds:   amlodipine  5 mg Oral Daily    aspirin  300 mg Rectal Daily    ferrous gluconate  324 mg Oral Daily with breakfast    magnesium oxide  400 mg Oral TID    pantoprazole  40 mg Intravenous Daily    potassium chloride  20 mEq Oral BID    pravastatin  20 mg Oral QHS    sodium chloride 0.9%  10 mL Intravenous Q6H    sodium chloride 0.9%  3 mL Intravenous Q8H     PRN Meds:albumin human 5%, albuterol sulfate, bisacodyl, fentaNYL, hydrALAZINE, magnesium hydroxide 400 mg/5 ml, magnesium sulfate IVPB, ondansetron, oxycodone, oxycodone, potassium chloride, potassium chloride, Flushing PICC Protocol **AND** sodium chloride 0.9% **AND** sodium chloride 0.9%, sodium phosphate IVPB, sodium phosphate IVPB, sodium phosphate IVPB    Review of patient's allergies indicates:  No Known Allergies    OBJECTIVE:     Vital Signs (Most Recent)  Temp: 97.5 °F (36.4 °C) (08/05/17 0700)  Pulse: 86 (08/05/17 1000)  Resp: 15 (08/05/17 1000)  BP: (!) 94/0 (08/05/17  0700)  SpO2: 100 % (08/05/17 0700)    Vital Signs Range (Last 24H):  Temp:  [97.5 °F (36.4 °C)-97.9 °F (36.6 °C)]   Pulse:  []   Resp:  [10-25]   BP: (76-94)/(0)   SpO2:  [98 %-100 %]   Arterial Line BP: (60-99)/(40-77)     I & O (Last 24H):    Intake/Output Summary (Last 24 hours) at 08/05/17 1118  Last data filed at 08/05/17 1000   Gross per 24 hour   Intake             2892 ml   Output             3065 ml   Net             -173 ml     Ventilator Data (Last 24H):     Oxygen Concentration (%):  [2] 2    Physical Exam   Constitutional: He appears well-developed and well-nourished. No distress. Resting comfortably   HENT:   Head: Normocephalic and atraumatic.   Eyes: Right eye exhibits no discharge. Left eye exhibits no discharge. No scleral icterus.   Neck: Normal range of motion. Neck supple.   Cardiovascular: Intact distal pulses.    LVAD hum present.   Pulmonary/Chest: Effort normal. No respiratory distress. NC 3L  R and L meds chest tubes in place- minimal serosanguinous drainage.  Bandage over sternotomy incision   Abdominal: Soft. He exhibits no distension.   Skin: Skin is warm and dry.     Laboratory (Last 24H):  CBC:    Recent Labs  Lab 08/05/17  0400   WBC 13.53*   HGB 9.1*   HCT 26.4*        CMP:    Recent Labs  Lab 08/04/17  1438  08/05/17  1010   CALCIUM 8.9  < > 8.4*   ALBUMIN 2.9*  --   --    PROT 6.6  --   --      < > 138   K 4.1  < > 3.7   CO2 23  < > 23     < > 105   BUN 45*  < > 42*   CREATININE 2.1*  < > 1.8*   ALKPHOS 61  --   --    ALT 15  --   --    AST 29  --   --    BILITOT 2.1*  --   --    < > = values in this interval not displayed.    ASSESSMENT/PLAN:     Neuro:   - AAOx3  -off sedation  -continue current pain mgmt     Pulmonary:   -on 3L NC- sating well  -Nehemias d/c 8/2/17  -Chest tubes - R med and 2 L meds. Minimal output     Cardiac:  -MAP range goal >65  -Dobutamine gtt 5  -Off Epi and Cardene gtt    Renal:   -Singer in place  -Bun/Cr stable 44/1.9  -UOP  >100cc/hr  -lasix gtt     Fluids/Electrolytes/Nutrition/GI:   -NGT for meds  -replace lytes PRN     Hematology/Oncology:  -on heparin gtt  -Aspirin 325mg     Infectious Disease:   -Afebrile   -WBC 13.5k  -cefepime D/C  -Cultures NGTD    Endocrine:  -endocrine following  -bg goal 140-180  -off insulin gtt     Dispo:  -Continue care in the ICU setting. Out of bed and in chair and PT    Charbel Ritter MD  SICU

## 2017-08-05 NOTE — ASSESSMENT & PLAN NOTE
- LVAD HM III  - Speed 5200  - no events  - LDH increased to 279. Monitor daily  - INR 3.7  - on heparin gtt

## 2017-08-05 NOTE — PLAN OF CARE
Problem: Patient Care Overview  Goal: Plan of Care Review  Outcome: Ongoing (interventions implemented as appropriate)  Pt on 2L NC; sats %. VSS. A&Ox4 and following commands. Free from fall/injury overnight. Gtts include: Amio, Cardene, Heparin, Lasix, and Dobutamine. Cardene titrated to maintain MAP 60-80; currently infusing at 1mg/hr. LVAD speed 5200; flows 3.9-4.2; PI and Joyner 3.5-4.5. Chest tube with minimal serosanguinous output overnight. NG tube in place to LIS; Pt remains NPO. Singer in place; UOP adequate. Labs monitored and electrolytes replaced per orders. Wife remains at bedside. Plan is to wean gtt and supplemental O2 support as appropriate. Plan of care reviewed with pt and wife. All questions and concerns addressed. See flowsheet for full assessment details.

## 2017-08-05 NOTE — SUBJECTIVE & OBJECTIVE
Interval History: no acute events overnight.  Persistent atrial fibrillation on tele    Review of Systems   Constitution: Negative.   HENT: Negative.    Eyes: Negative.    Cardiovascular: Negative.    Respiratory: Negative.    Endocrine: Negative.    Skin: Negative.    Musculoskeletal: Negative.    Gastrointestinal: Negative.    Genitourinary: Negative.    Neurological: Negative.      Objective:     Vital Signs (Most Recent):  Temp: 97.5 °F (36.4 °C) (08/05/17 0700)  Pulse: 94 (08/05/17 1230)  Resp: 17 (08/05/17 1230)  BP: (!) 94/0 (08/05/17 0700)  SpO2: 100 % (08/05/17 0700) Vital Signs (24h Range):  Temp:  [97.5 °F (36.4 °C)-97.9 °F (36.6 °C)] 97.5 °F (36.4 °C)  Pulse:  [] 94  Resp:  [10-25] 17  SpO2:  [98 %-100 %] 100 %  BP: (76-94)/(0) 94/0  Arterial Line BP: ()/(40-81) 77/65     Weight: 83.5 kg (184 lb 1.4 oz)  Body mass index is 27.99 kg/m².     SpO2: 100 %  O2 Device (Oxygen Therapy): nasal cannula    Physical Exam   Constitutional: He is oriented to person, place, and time. He appears well-developed and well-nourished.   HENT:   Head: Normocephalic and atraumatic.   Eyes: Conjunctivae and EOM are normal. Pupils are equal, round, and reactive to light.   Neck: Normal range of motion. Neck supple. No JVD present.   Cardiovascular:   Smooth vad sounds     Pulmonary/Chest: Effort normal and breath sounds normal. No respiratory distress. He has no wheezes. He has no rales. He exhibits no tenderness.   Abdominal: Soft. Bowel sounds are normal. He exhibits no distension. There is no tenderness.   Musculoskeletal: Normal range of motion. He exhibits no edema or tenderness.   Neurological: He is alert and oriented to person, place, and time.   Skin: Skin is warm and dry. No erythema. No pallor.       Significant Labs: EP:   Recent Labs  Lab 08/04/17  0303  08/04/17  1438 08/04/17  2130 08/05/17  0400 08/05/17  1010   *  < > 136 137 138  138 138   K 3.7  < > 4.1 3.6 3.6  3.6 3.7     < > 101  103 103  103 105   CO2 23  < > 23 22* 23  23 23   *  < > 91 108 115*  115* 114*   BUN 45*  < > 45* 45* 44*  44* 42*   CREATININE 2.0*  < > 2.1* 2.0* 1.9*  1.9* 1.8*   CALCIUM 8.7  < > 8.9 8.5* 8.6*  8.6* 8.4*   PROT 6.5  --  6.6  --   --   --    ALBUMIN 2.9*  --  2.9*  --   --   --    BILITOT 1.9*  --  2.1*  --   --   --    ALKPHOS 57  --  61  --   --   --    AST 19  --  29  --   --   --    ALT 16  --  15  --   --   --    ANIONGAP 12  < > 12 12 12  12 10   ESTGFRAFRICA 38.8*  < > 36.6* 38.8* 41.3*  41.3* 44.0*   EGFRNONAA 33.5*  < > 31.6* 33.5* 35.7*  35.7* 38.1*   WBC 11.36  --  18.33*  --  13.53*  --    HGB 9.8*  --  10.0*  --  9.1*  --    HCT 28.4*  --  28.7*  --  26.4*  --      --  290  --  269  --    INR 2.9*  --  3.4*  --  3.7*  --    < > = values in this interval not displayed.    Significant Imaging: Echocardiogram:   2D echo with color flow doppler:   Results for orders placed or performed during the hospital encounter of 07/18/17   2D echo with color flow doppler   Result Value Ref Range    Est. PA Systolic Pressure 33.87     Tricuspid Valve Regurgitation MILD

## 2017-08-05 NOTE — SUBJECTIVE & OBJECTIVE
Interval History:   Passed flatus this am   Still with some nausea   OOB yesterday, chair now     Continuous Infusions:   amiodarone 0.5 mg/min (08/05/17 1300)    dextrose 5 % and 0.9 % NaCl 80 mL/hr at 08/05/17 1300    DOBUTamine 5 mcg/kg/min (08/05/17 1300)    furosemide (LASIX) 1 mg/mL infusion (non-titrating) 10 mg/hr (08/05/17 1300)    heparin (porcine) in 5 % dex 400 Units/hr (08/05/17 1300)    nicardipine Stopped (08/05/17 0600)     Scheduled Meds:   amlodipine  5 mg Oral Daily    aspirin  300 mg Rectal Daily    pantoprazole  40 mg Intravenous Daily    pravastatin  20 mg Oral QHS    sodium chloride 0.9%  10 mL Intravenous Q6H    sodium chloride 0.9%  3 mL Intravenous Q8H     PRN Meds:albumin human 5%, albuterol sulfate, bisacodyl, fentaNYL, hydrALAZINE, magnesium hydroxide 400 mg/5 ml, magnesium sulfate IVPB, ondansetron, oxycodone, oxycodone, potassium chloride, potassium chloride, Flushing PICC Protocol **AND** sodium chloride 0.9% **AND** sodium chloride 0.9%, sodium phosphate IVPB, sodium phosphate IVPB, sodium phosphate IVPB    Review of patient's allergies indicates:  No Known Allergies  Objective:     Vital Signs (Most Recent):  Temp: 98 °F (36.7 °C) (08/05/17 1100)  Pulse: 94 (08/05/17 1300)  Resp: 16 (08/05/17 1300)  BP: (!) 80/0 (08/05/17 1100)  SpO2: 100 % (08/05/17 1100) Vital Signs (24h Range):  Temp:  [97.5 °F (36.4 °C)-98 °F (36.7 °C)] 98 °F (36.7 °C)  Pulse:  [] 94  Resp:  [10-25] 16  SpO2:  [98 %-100 %] 100 %  BP: (76-94)/(0) 80/0  Arterial Line BP: ()/(40-81) 68/58     Weight: 83.5 kg (184 lb 1.4 oz)  Body mass index is 27.99 kg/m².      Intake/Output Summary (Last 24 hours) at 08/05/17 1408  Last data filed at 08/05/17 1300   Gross per 24 hour   Intake             2862 ml   Output             2090 ml   Net              772 ml       Hemodynamic Parameters:           Physical Exam    Significant Labs:  CBC:    Recent Labs  Lab 08/05/17  0400   WBC 13.53*   RBC 3.27*    HGB 9.1*   HCT 26.4*      MCV 81*   MCH 27.8   MCHC 34.5     BNP:    Recent Labs  Lab 08/04/17  0303   BNP 1,381*     CMP:    Recent Labs  Lab 08/04/17  1438  08/05/17  1010   GLU 91  < > 114*   CALCIUM 8.9  < > 8.4*   ALBUMIN 2.9*  --   --    PROT 6.6  --   --      < > 138   K 4.1  < > 3.7   CO2 23  < > 23     < > 105   BUN 45*  < > 42*   CREATININE 2.1*  < > 1.8*   ALKPHOS 61  --   --    ALT 15  --   --    AST 29  --   --    BILITOT 2.1*  --   --    < > = values in this interval not displayed.   Coagulation:     Recent Labs  Lab 08/05/17  0400 08/05/17  1010   INR 3.7*  --    APTT 36.8*  36.8* 36.8*     LDH:    Recent Labs  Lab 08/03/17  0400 08/04/17  0303 08/05/17  0400   * 289* 278*     Microbiology:  Microbiology Results (last 7 days)     Procedure Component Value Units Date/Time    Blood culture [[id number]] Collected:  08/04/17 1653    Order Status:  Completed Specimen:  Blood from Peripheral, Wrist, Right Updated:  08/05/17 0315     Blood Culture, Routine No Growth to date    Blood culture [[id number]] Collected:  08/04/17 1654    Order Status:  Completed Specimen:  Blood from Peripheral, Wrist, Left Updated:  08/05/17 0315     Blood Culture, Routine No Growth to date    Urine culture [[id number]] Collected:  08/04/17 1655    Order Status:  Sent Specimen:  Urine from Urine, Catheterized Updated:  08/04/17 1808    Blood culture [[id number]] Collected:  07/24/17 1300    Order Status:  Completed Specimen:  Blood from Peripheral, Hand, Right Updated:  07/29/17 1812     Blood Culture, Routine No growth after 5 days.    Urine culture [[id number]]  (Susceptibility) Collected:  07/24/17 1139    Order Status:  Completed Specimen:  Urine from Urine, Clean Catch Updated:  07/29/17 1506     Urine Culture, Routine --     ENTEROCOCCUS FAECALIS  >100,000 cfu/ml  No other significant isolate      Blood culture [[id number]] Collected:  07/24/17 1100    Order Status:  Completed Specimen:  Blood from  Peripheral, Antecubital, Left Updated:  07/29/17 1412     Blood Culture, Routine No growth after 5 days.          I have reviewed all pertinent labs within the past 24 hours.    Estimated Creatinine Clearance: 41.9 mL/min (based on Cr of 1.8).    Diagnostic Results:  I have reviewed all pertinent imaging results/findings within the past 24 hours.

## 2017-08-05 NOTE — ASSESSMENT & PLAN NOTE
-bridging to coumadin, INR 2.5.  -Per staff, will likely switch the RV and LV ports as he does not need to BiV pace but simply sense for VT/VF protection   -ICD is off but patient has pads to chest wall in case it is needed  -has frequent afib with PVCs at his baseline and this continues  -Has LVAD (HMIII) in place at this time  -procedure will likely be done today, patient is NPO except meds

## 2017-08-05 NOTE — ASSESSMENT & PLAN NOTE
- CTS Primary  - s/p LVAD HM III (7/27/17), speed @ 5200 rpm  - chest closure (7/28/17)  - extubated (7/29/17)  - on , now off epi , lasix 20.     - off cardene, vasopressin gtt, epi   -Anticoagulation per CTS, heparin gtt subtherapeutic x 3 readings

## 2017-08-05 NOTE — PROGRESS NOTES
Daily E and M and VAD Interrogation Note    Reason for Visit:  Patient is seen in follow up for management of:  [] HeartMate II  [] Heartware [] Total artificial heart       [] ECMO           [x] Other - HM III     Interval History:  [] Interval history unobtainable due to intubation.  The [x] implant/[] explant date was 7/27/17    Events - NAEON. Afebrile. UOP 1300. NGT 1525. Passing flatus, but no BM. Ambulating. On dobutamine 5.      Review of Systems:   Negative except as above.      Medications:  Current Facility-Administered Medications   Medication Dose Route Frequency Provider Last Rate Last Dose    albumin human 5% bottle 500 mL  500 mL Intravenous PRN Shayne Espinoza MD   500 mL at 07/28/17 1755    albuterol nebulizer solution 2.5 mg  2.5 mg Nebulization Q4H PRN Shayne Espinoza MD        amiodarone 360 mg/200 mL (1.8 mg/mL) infusion  0.5 mg/min Intravenous Continuous Shayne Espinoza MD 16.7 mL/hr at 08/05/17 1200 0.5 mg/min at 08/05/17 1200    amlodipine tablet 5 mg  5 mg Oral Daily Shayne Espinoza MD        aspirin suppository 300 mg  300 mg Rectal Daily Shayne Espinoza MD   300 mg at 08/05/17 1000    bisacodyl suppository 10 mg  10 mg Rectal Daily PRN Shayne Espinoza MD   10 mg at 08/02/17 1200    dextrose 5 % and 0.9 % NaCl infusion   Intravenous Continuous Sunny Downing MD 80 mL/hr at 08/05/17 1200      DOBUtamine 1000 mg in D5W 250 mL infusion (premix non-titrating)  5 mcg/kg/min (Dosing Weight) Intravenous Continuous Shayne Espinoza MD 6 mL/hr at 08/05/17 1204 5 mcg/kg/min at 08/05/17 1204    EPINEPHrine (ADRENALIN) 4 mg in sodium chloride 0.9% 250 mL infusion  0.01 mcg/kg/min (Dosing Weight) Intravenous Continuous Shayne Espinoza MD   Stopped at 08/04/17 1800    fentaNYL injection 50 mcg  50 mcg Intravenous Q4H PRN Shayne Espinoza MD   50 mcg at 07/29/17 0827    ferrous gluconate tablet 324 mg  324 mg Oral Daily with breakfast Shayne Espinoza MD    Stopped at 08/03/17 0745    furosemide (LASIX) 250 mg in sodium chloride 0.9 % 250 mL infusion (non-titrating)  10 mg/hr Intravenous Continuous Sunny Downing MD 10 mL/hr at 08/05/17 1200 10 mg/hr at 08/05/17 1200    heparin 25,000 units in dextrose 5% 250 mL (100 units/mL) infusion (heparin infusion)  400 Units/hr Intravenous Continuous Shayne Espinoza MD 4 mL/hr at 08/05/17 1200 400 Units/hr at 08/05/17 1200    hydrALAZINE injection 10 mg  10 mg Intravenous Q6H PRN Shayne Espinoza MD   10 mg at 08/05/17 1212    magnesium hydroxide 400 mg/5 ml suspension 2,400 mg  30 mL Oral Daily PRN Shayne Espinoza MD        magnesium oxide tablet 400 mg  400 mg Oral TID Shayne Espinoza MD   Stopped at 08/02/17 0600    magnesium sulfate 2g in water 50mL IVPB (premix)  2 g Intravenous PRN Shayne Espinoza MD   2 g at 07/30/17 0731    niCARdipine 40 mg/200 mL infusion  1 mg/hr Intravenous Continuous Shayne Espinoza MD   Stopped at 08/05/17 0600    ondansetron injection 4 mg  4 mg Intravenous Q6H PRN Shayne Espinoza MD   4 mg at 08/04/17 0516    oxycodone immediate release tablet 10 mg  10 mg Oral Q4H PRN Shayne Espinoza MD        oxycodone immediate release tablet 5 mg  5 mg Oral Q4H PRN Shayne Espinoza MD   5 mg at 07/30/17 0817    pantoprazole injection 40 mg  40 mg Intravenous Daily Shayne Espinoza MD   40 mg at 08/05/17 1000    potassium chloride 20 mEq in 100 mL IVPB (FOR CENTRAL LINE ADMINISTRATION ONLY)  40 mEq Intravenous PRN Shayne Espinoza MD 50 mL/hr at 08/05/17 1208 40 mEq at 08/05/17 1208    potassium chloride 20 mEq in 100 mL IVPB (FOR CENTRAL LINE ADMINISTRATION ONLY)  40 mEq Intravenous PRN Shayne Espinoza MD 50 mL/hr at 08/04/17 2200 20 mEq at 08/04/17 2200    potassium chloride CR capsule 20 mEq  20 mEq Oral BID Shayne Espinoza MD   Stopped at 08/04/17 0900    pravastatin tablet 20 mg  20 mg Oral QHS Lorena Payton MD   Stopped at 08/04/17 2100     sodium chloride 0.9% flush 10 mL  10 mL Intravenous Q6H Sunny Downing MD   10 mL at 08/05/17 1200    And    sodium chloride 0.9% flush 10 mL  10 mL Intravenous PRN Sunny Downing MD        sodium chloride 0.9% flush 3 mL  3 mL Intravenous Q8H Shayne Espinoza MD   3 mL at 08/05/17 0600    sodium phosphate 15 mmol in dextrose 5 % 250 mL IVPB  15 mmol Intravenous PRN Edwige Clay MD 83.3 mL/hr at 07/29/17 2240 15 mmol at 07/29/17 2240    sodium phosphate 20.01 mmol in dextrose 5 % 250 mL IVPB  20.01 mmol Intravenous PRN Edwige Clay MD        sodium phosphate 30 mmol in dextrose 5 % 250 mL IVPB  30 mmol Intravenous PRN Edwige Clay MD         Physical Examination:  Vital Signs:   Vitals:    08/05/17 1230   BP:    Pulse: 94   Resp: 17   Temp:      Cardiovascular:  [x] Regular rate and rhythm [] Irregular []  None (MATT) []  Other  []  No edema [x]  Edema present  [x]  Clear to auscultation  []  Rales to []  Coarse  []  No rales but   [] Pedal Pulses absent  [x]  Pulses  + throughout  Skin:  Incision is [x]  Clean, dry and intact.  []  Other   Sternum:  [x]  Stable []  Unstable  Driveline(s):   [x]  Clean, dry and intact. []  Other     Labs:  BMP  Lab Results   Component Value Date     08/05/2017    K 3.7 08/05/2017     08/05/2017    CO2 23 08/05/2017    BUN 42 (H) 08/05/2017    CREATININE 1.8 (H) 08/05/2017    CALCIUM 8.4 (L) 08/05/2017    ANIONGAP 10 08/05/2017    ESTGFRAFRICA 44.0 (A) 08/05/2017    EGFRNONAA 38.1 (A) 08/05/2017     Magnesium   Date Value Ref Range Status   08/05/2017 2.4 1.6 - 2.6 mg/dL Final     Lab Results   Component Value Date    WBC 13.53 (H) 08/05/2017    HGB 9.1 (L) 08/05/2017    HCT 26.4 (L) 08/05/2017    MCV 81 (L) 08/05/2017     08/05/2017     Lab Results   Component Value Date    INR 3.7 (H) 08/05/2017    INR 3.4 (H) 08/04/2017    INR 2.9 (H) 08/04/2017     BNP   Date Value Ref Range Status   08/04/2017 1,381 (H) 0 - 99 pg/mL Final      Comment:     Values of less than 100 pg/ml are consistent with non-CHF populations.   08/02/2017 1,398 (H) 0 - 99 pg/mL Final     Comment:     Values of less than 100 pg/ml are consistent with non-CHF populations.   07/31/2017 1,389 (H) 0 - 99 pg/mL Final     Comment:     Values of less than 100 pg/ml are consistent with non-CHF populations.     LD   Date Value Ref Range Status   08/05/2017 278 (H) 110 - 260 U/L Final     Comment:     Results are increased in hemolyzed samples.   08/04/2017 289 (H) 110 - 260 U/L Final     Comment:     Results are increased in hemolyzed samples.   08/03/2017 285 (H) 110 - 260 U/L Final     Comment:     Results are increased in hemolyzed samples.     X-Rays:  [x]  I reviewed today's Chest x-ray    Procedure:  Device Interrogation including analysis of device parameters.  Current Settings   [x]  Ventricular Assist Device  []  Total Artificial Heart interrogated  Review of device function is [x]  Stable []  Other   TXP LVAD INTERROGATIONS 8/5/2017 8/5/2017 8/5/2017 8/5/2017 8/5/2017 8/5/2017 8/5/2017   Type HeartMate3 HeartMate3 HeartMate3 HeartMate3 HeartMate3 HeartMate3 HeartMate3   Flow 3.3 3.4 3.9 3.7 4.1 4.0 4.2   Speed 5200 5200 5200 5200 5200 5200 5200   PI 7.2 6.9 4.6 5.3 4.2 4.5 3.4   Power ([place]) 3.5 3.6 3.6 3.5 3.5 3.8 3.6   LSL - 4800 - - - 4800 4800   Pulsatility - Intermittent pulse - - - Intermittent pulse -       Assessment:  [x]  Primary Cardiomyopathy []  Congestive Heart Failure   []  Atrial Fibrillation []  Ventricular Tachycardia   []  Aftercare cardiac device []  Long term (current) use of anticoagulants   []  Ventilator-associated pneumonia []  Pneumonia viral, unspecified   []  Pneumonia, bacterial, unspecified []  Pneumonia, organism unspecified   []  Hemorrhage of GI tract, unspecified    []  Nosebleed []  Driveline infection   []  Infection VAD device []  New onset of    []        Plan:  [x]  Interval history obtained from ICU attending team member during  rounding today  []  VAD/MATT teaching performed with patient  []  Mobilization / Physical Therapy ongoing  []  Anticoagulation []  Ongoing []  Held  []  Studies ordered  [x]   To OR today for closure.       Total time spent was 30 minutes.  Of which more than 50 percent of the care dominated counseling and coordinating care with different team members. The VAD was interrogated and all parameters were WNL and no significant findings were found in the history. All these findings are documented in the note above.    Neuro:  - Continue current pain control regimen    Resp:  - ExtubatedOxygen Concentration (%):  [2] 2  - Minimize supplemental O2 requirements  - Pulm toilet  - Nehemias off  - wean off O2    CV:  - HDS; LVAD numbers stable o/n  -   - Amio 400 mg BID    - Dobutamine at 5  - HTN    - Norvasc to 5 mg   - PRN hydralazine   -     Heme:  - Hgb/Hct stable  - Continue to trend  - INR supertherapeutic at 3.7 - hold Coumadin 2/2 inability to absorb given ileus  - Restart heparin at 400, non-titrating    ID:  - Tmax 98.5  - .5  - continue to monitor     Renal:  - Singer in place  - Strict I/Os   - Adequate UOP - 1300  - Lasix gtt at 10 mg/hr  - Hold diuril   - Cr 1.9    FEN/GI:  - NPO - strict  - IVF  - Miralax BID  - Replace lytes PRN    Endo:  - Endocrine following, appreciate assistance  - Accuchecks  - Insulin gtt    Dispo:  - Continue ICU care  - Awaiting bowel function     Pradip Vanegas MD  08/05/2017 1:03 PM    Cardiac Surgery Attending E and M (VAD) Note along with VAD Interrogation    I have seen and examined the patient and agree with the findings above    I also reviewed the patients clinical course and:  [x]  Hemodynamic & Respiratory paramters  [x]  Laboratory Data  [x]  Radiological studies     VAD Interrogated [x]      VAD Function is normal. Changes made []  None [x]        Interrogation of Ventricular assist device was performed with physician analysis of device parameters and review of  device function. I have personally reviewed the interrogation findings and agree with findings as stated

## 2017-08-05 NOTE — PROGRESS NOTES
[facility]  Heart Transplant  Progress Note    Patient Name: Suman Hayden  MRN: [id number]  Admission Date: 7/18/2017  Hospital Length of Stay: 18 days  Attending Physician: Sunny Downing MD  Primary Care Provider: Joe Ernst MD  Principal Problem:Acute on chronic combined systolic and diastolic heart failure    Subjective:     Interval History:   Passed flatus this am   Still with some nausea   OOB yesterday, chair now     Continuous Infusions:   amiodarone 0.5 mg/min (08/05/17 1300)    dextrose 5 % and 0.9 % NaCl 80 mL/hr at 08/05/17 1300    DOBUTamine 5 mcg/kg/min (08/05/17 1300)    furosemide (LASIX) 1 mg/mL infusion (non-titrating) 10 mg/hr (08/05/17 1300)    heparin (porcine) in 5 % dex 400 Units/hr (08/05/17 1300)    nicardipine Stopped (08/05/17 0600)     Scheduled Meds:   amlodipine  5 mg Oral Daily    aspirin  300 mg Rectal Daily    pantoprazole  40 mg Intravenous Daily    pravastatin  20 mg Oral QHS    sodium chloride 0.9%  10 mL Intravenous Q6H    sodium chloride 0.9%  3 mL Intravenous Q8H     PRN Meds:albumin human 5%, albuterol sulfate, bisacodyl, fentaNYL, hydrALAZINE, magnesium hydroxide 400 mg/5 ml, magnesium sulfate IVPB, ondansetron, oxycodone, oxycodone, potassium chloride, potassium chloride, Flushing PICC Protocol **AND** sodium chloride 0.9% **AND** sodium chloride 0.9%, sodium phosphate IVPB, sodium phosphate IVPB, sodium phosphate IVPB    Review of patient's allergies indicates:  No Known Allergies  Objective:     Vital Signs (Most Recent):  Temp: 98 °F (36.7 °C) (08/05/17 1100)  Pulse: 94 (08/05/17 1300)  Resp: 16 (08/05/17 1300)  BP: (!) 80/0 (08/05/17 1100)  SpO2: 100 % (08/05/17 1100) Vital Signs (24h Range):  Temp:  [97.5 °F (36.4 °C)-98 °F (36.7 °C)] 98 °F (36.7 °C)  Pulse:  [] 94  Resp:  [10-25] 16  SpO2:  [98 %-100 %] 100 %  BP: (76-94)/(0) 80/0  Arterial Line BP: ()/(40-81) 68/58     Weight: 83.5 kg (184 lb 1.4 oz)  Body mass index is  27.99 kg/m².      Intake/Output Summary (Last 24 hours) at 08/05/17 1408  Last data filed at 08/05/17 1300   Gross per 24 hour   Intake             2862 ml   Output             2090 ml   Net              772 ml       Hemodynamic Parameters:           Physical Exam    Significant Labs:  CBC:    Recent Labs  Lab 08/05/17  0400   WBC 13.53*   RBC 3.27*   HGB 9.1*   HCT 26.4*      MCV 81*   MCH 27.8   MCHC 34.5     BNP:    Recent Labs  Lab 08/04/17  0303   BNP 1,381*     CMP:    Recent Labs  Lab 08/04/17  1438  08/05/17  1010   GLU 91  < > 114*   CALCIUM 8.9  < > 8.4*   ALBUMIN 2.9*  --   --    PROT 6.6  --   --      < > 138   K 4.1  < > 3.7   CO2 23  < > 23     < > 105   BUN 45*  < > 42*   CREATININE 2.1*  < > 1.8*   ALKPHOS 61  --   --    ALT 15  --   --    AST 29  --   --    BILITOT 2.1*  --   --    < > = values in this interval not displayed.   Coagulation:     Recent Labs  Lab 08/05/17  0400 08/05/17  1010   INR 3.7*  --    APTT 36.8*  36.8* 36.8*     LDH:    Recent Labs  Lab 08/03/17  0400 08/04/17  0303 08/05/17  0400   * 289* 278*     Microbiology:  Microbiology Results (last 7 days)     Procedure Component Value Units Date/Time    Blood culture [[id number]] Collected:  08/04/17 1653    Order Status:  Completed Specimen:  Blood from Peripheral, Wrist, Right Updated:  08/05/17 0315     Blood Culture, Routine No Growth to date    Blood culture [[id number]] Collected:  08/04/17 1654    Order Status:  Completed Specimen:  Blood from Peripheral, Wrist, Left Updated:  08/05/17 0315     Blood Culture, Routine No Growth to date    Urine culture [[id number]] Collected:  08/04/17 1655    Order Status:  Sent Specimen:  Urine from Urine, Catheterized Updated:  08/04/17 1808    Blood culture [[id number]] Collected:  07/24/17 1300    Order Status:  Completed Specimen:  Blood from Peripheral, Hand, Right Updated:  07/29/17 1812     Blood Culture, Routine No growth after 5 days.    Urine culture  [[id number]]  (Susceptibility) Collected:  07/24/17 1139    Order Status:  Completed Specimen:  Urine from Urine, Clean Catch Updated:  07/29/17 1506     Urine Culture, Routine --     ENTEROCOCCUS FAECALIS  >100,000 cfu/ml  No other significant isolate      Blood culture [[id number]] Collected:  07/24/17 1100    Order Status:  Completed Specimen:  Blood from Peripheral, Antecubital, Left Updated:  07/29/17 1412     Blood Culture, Routine No growth after 5 days.          I have reviewed all pertinent labs within the past 24 hours.    Estimated Creatinine Clearance: 41.9 mL/min (based on Cr of 1.8).    Diagnostic Results:  I have reviewed all pertinent imaging results/findings within the past 24 hours.    Assessment and Plan:     Atrial fibrillation    - c/w heparin gtt  - c/w amio gtt        LVAD (left ventricular assist device) present    - LVAD HM III  - Speed 5200  - no events  - LDH increased to 279. Monitor daily  - INR 3.7  - on heparin gtt        Urine culture positive    - afebrile, treated with IV cefipime         Atrial tachycardia    - [id number] - 3  - c/w amiodarone  - c/w heparin gtt        AICD discharge    - appropriate in the setting of VT aggravated by underlying AT/AFL (earlier during the admission)  - 7/28 - setting of AF undersense - device reprogrammed to VVI 80  - tachy therapy turned off  - on amio gtt  - EP planning to do lead revision once INR theraputic         Hepatitis B core antibody positive since 2012    - will defer liver biopsy as CTS not requiring it  - ID consult cleared the patient for sx  - case discussed with hepatology, low suspicion for liver involvement        V-tach    - s/p amiodarone reload - now on amio gtt        Hyperlipidemia    - c/w pravastatin        * Acute on chronic combined systolic and diastolic heart failure    - CTS Primary  - s/p LVAD HM III (7/27/17), speed @ 5200 rpm  - chest closure (7/28/17)  - extubated (7/29/17)  - on , now off epi , lasix 20.     -  off cardene, vasopressin gtt, epi   -Anticoagulation per CTS, heparin gtt subtherapeutic x 3 readings                  Óscar Gama MD  Heart Transplant  [facility]

## 2017-08-05 NOTE — PROGRESS NOTES
Pt in chair, acting bizarre, unable to answer orientation questions. Pt then became hypotensive, MAPs dropping to 40-50's. Epi restarted to keep MAP > 60. Notified YOVANY Francois NP. NP and Dr. Downing to bedside. STAT ABG, full set of labs ordered. IVF increased to 80cc/hr. Lasix decreased to 10mg/hr. Pt returned to bed with x3 nurses with little difficulty. Epi weaned off. RN remains at bedside.

## 2017-08-06 PROBLEM — Z95.810 AICD (AUTOMATIC CARDIOVERTER/DEFIBRILLATOR) PRESENT: Status: ACTIVE | Noted: 2017-08-06

## 2017-08-06 LAB
ANION GAP SERPL CALC-SCNC: 12 MMOL/L
ANION GAP SERPL CALC-SCNC: 12 MMOL/L
ANION GAP SERPL CALC-SCNC: 13 MMOL/L
APTT BLDCRRT: 32.6 SEC
APTT BLDCRRT: 34.2 SEC
APTT BLDCRRT: 38.5 SEC
APTT BLDCRRT: 41.9 SEC
BASOPHILS # BLD AUTO: 0 K/UL
BASOPHILS NFR BLD: 0 %
BUN SERPL-MCNC: 43 MG/DL
BUN SERPL-MCNC: 47 MG/DL
BUN SERPL-MCNC: 48 MG/DL
CALCIUM SERPL-MCNC: 8.4 MG/DL
CALCIUM SERPL-MCNC: 8.6 MG/DL
CALCIUM SERPL-MCNC: 8.7 MG/DL
CHLORIDE SERPL-SCNC: 104 MMOL/L
CHLORIDE SERPL-SCNC: 104 MMOL/L
CHLORIDE SERPL-SCNC: 107 MMOL/L
CO2 SERPL-SCNC: 20 MMOL/L
CO2 SERPL-SCNC: 21 MMOL/L
CO2 SERPL-SCNC: 22 MMOL/L
CREAT SERPL-MCNC: 1.8 MG/DL
CREAT SERPL-MCNC: 2 MG/DL
CREAT SERPL-MCNC: 2 MG/DL
DIFFERENTIAL METHOD: ABNORMAL
EOSINOPHIL # BLD AUTO: 0.1 K/UL
EOSINOPHIL NFR BLD: 0.9 %
ERYTHROCYTE [DISTWIDTH] IN BLOOD BY AUTOMATED COUNT: 16 %
EST. GFR  (AFRICAN AMERICAN): 38.8 ML/MIN/1.73 M^2
EST. GFR  (AFRICAN AMERICAN): 38.8 ML/MIN/1.73 M^2
EST. GFR  (AFRICAN AMERICAN): 44 ML/MIN/1.73 M^2
EST. GFR  (NON AFRICAN AMERICAN): 33.5 ML/MIN/1.73 M^2
EST. GFR  (NON AFRICAN AMERICAN): 33.5 ML/MIN/1.73 M^2
EST. GFR  (NON AFRICAN AMERICAN): 38.1 ML/MIN/1.73 M^2
GLUCOSE SERPL-MCNC: 101 MG/DL
GLUCOSE SERPL-MCNC: 119 MG/DL
GLUCOSE SERPL-MCNC: 120 MG/DL
HCT VFR BLD AUTO: 27 %
HGB BLD-MCNC: 8.9 G/DL
INR PPP: 2.7
LDH SERPL L TO P-CCNC: 307 U/L
LYMPHOCYTES # BLD AUTO: 1.4 K/UL
LYMPHOCYTES NFR BLD: 12.3 %
MAGNESIUM SERPL-MCNC: 2.5 MG/DL
MAGNESIUM SERPL-MCNC: 2.5 MG/DL
MAGNESIUM SERPL-MCNC: 2.6 MG/DL
MCH RBC QN AUTO: 27.3 PG
MCHC RBC AUTO-ENTMCNC: 33 G/DL
MCV RBC AUTO: 83 FL
MONOCYTES # BLD AUTO: 0.9 K/UL
MONOCYTES NFR BLD: 7.9 %
NEUTROPHILS # BLD AUTO: 9.2 K/UL
NEUTROPHILS NFR BLD: 78.6 %
PHOSPHATE SERPL-MCNC: 3.3 MG/DL
PLATELET # BLD AUTO: 312 K/UL
PMV BLD AUTO: 11.1 FL
POTASSIUM SERPL-SCNC: 4.2 MMOL/L
POTASSIUM SERPL-SCNC: 4.4 MMOL/L
POTASSIUM SERPL-SCNC: 4.5 MMOL/L
PROTHROMBIN TIME: 27.4 SEC
RBC # BLD AUTO: 3.26 M/UL
SODIUM SERPL-SCNC: 138 MMOL/L
SODIUM SERPL-SCNC: 138 MMOL/L
SODIUM SERPL-SCNC: 139 MMOL/L
WBC # BLD AUTO: 11.7 K/UL

## 2017-08-06 PROCEDURE — 25000003 PHARM REV CODE 250: Performed by: STUDENT IN AN ORGANIZED HEALTH CARE EDUCATION/TRAINING PROGRAM

## 2017-08-06 PROCEDURE — 63600175 PHARM REV CODE 636 W HCPCS: Performed by: STUDENT IN AN ORGANIZED HEALTH CARE EDUCATION/TRAINING PROGRAM

## 2017-08-06 PROCEDURE — 27000248 HC VAD-ADDITIONAL DAY

## 2017-08-06 PROCEDURE — C9113 INJ PANTOPRAZOLE SODIUM, VIA: HCPCS | Performed by: STUDENT IN AN ORGANIZED HEALTH CARE EDUCATION/TRAINING PROGRAM

## 2017-08-06 PROCEDURE — 84100 ASSAY OF PHOSPHORUS: CPT

## 2017-08-06 PROCEDURE — 93750 INTERROGATION VAD IN PERSON: CPT | Mod: ,,, | Performed by: THORACIC SURGERY (CARDIOTHORACIC VASCULAR SURGERY)

## 2017-08-06 PROCEDURE — 85610 PROTHROMBIN TIME: CPT

## 2017-08-06 PROCEDURE — A4216 STERILE WATER/SALINE, 10 ML: HCPCS | Performed by: STUDENT IN AN ORGANIZED HEALTH CARE EDUCATION/TRAINING PROGRAM

## 2017-08-06 PROCEDURE — 85025 COMPLETE CBC W/AUTO DIFF WBC: CPT

## 2017-08-06 PROCEDURE — 83615 LACTATE (LD) (LDH) ENZYME: CPT

## 2017-08-06 PROCEDURE — 99233 SBSQ HOSP IP/OBS HIGH 50: CPT | Mod: GC,,, | Performed by: ANESTHESIOLOGY

## 2017-08-06 PROCEDURE — 93750 INTERROGATION VAD IN PERSON: CPT | Performed by: THORACIC SURGERY (CARDIOTHORACIC VASCULAR SURGERY)

## 2017-08-06 PROCEDURE — 25000003 PHARM REV CODE 250: Performed by: THORACIC SURGERY (CARDIOTHORACIC VASCULAR SURGERY)

## 2017-08-06 PROCEDURE — 63600175 PHARM REV CODE 636 W HCPCS: Performed by: THORACIC SURGERY (CARDIOTHORACIC VASCULAR SURGERY)

## 2017-08-06 PROCEDURE — 99233 SBSQ HOSP IP/OBS HIGH 50: CPT | Mod: 24,,, | Performed by: THORACIC SURGERY (CARDIOTHORACIC VASCULAR SURGERY)

## 2017-08-06 PROCEDURE — 99233 SBSQ HOSP IP/OBS HIGH 50: CPT | Mod: GC,,, | Performed by: INTERNAL MEDICINE

## 2017-08-06 PROCEDURE — 85730 THROMBOPLASTIN TIME PARTIAL: CPT | Mod: 91

## 2017-08-06 PROCEDURE — 97116 GAIT TRAINING THERAPY: CPT

## 2017-08-06 PROCEDURE — 80048 BASIC METABOLIC PNL TOTAL CA: CPT | Mod: 91

## 2017-08-06 PROCEDURE — 97530 THERAPEUTIC ACTIVITIES: CPT

## 2017-08-06 PROCEDURE — 83735 ASSAY OF MAGNESIUM: CPT

## 2017-08-06 PROCEDURE — 27000221 HC OXYGEN, UP TO 24 HOURS

## 2017-08-06 PROCEDURE — 97535 SELF CARE MNGMENT TRAINING: CPT

## 2017-08-06 PROCEDURE — 20000000 HC ICU ROOM

## 2017-08-06 PROCEDURE — A4216 STERILE WATER/SALINE, 10 ML: HCPCS | Performed by: THORACIC SURGERY (CARDIOTHORACIC VASCULAR SURGERY)

## 2017-08-06 PROCEDURE — 99231 SBSQ HOSP IP/OBS SF/LOW 25: CPT | Mod: GC,,, | Performed by: INTERNAL MEDICINE

## 2017-08-06 RX ORDER — CEFAZOLIN SODIUM 2 G/50ML
2 SOLUTION INTRAVENOUS
Status: CANCELLED | OUTPATIENT
Start: 2017-08-06

## 2017-08-06 RX ADMIN — Medication 3 ML: at 10:08

## 2017-08-06 RX ADMIN — ASPIRIN 300 MG: 300 SUPPOSITORY RECTAL at 09:08

## 2017-08-06 RX ADMIN — Medication 10 ML: at 12:08

## 2017-08-06 RX ADMIN — AMIODARONE HYDROCHLORIDE 0.5 MG/MIN: 1.8 INJECTION, SOLUTION INTRAVENOUS at 12:08

## 2017-08-06 RX ADMIN — Medication 10 ML: at 06:08

## 2017-08-06 RX ADMIN — Medication 10 ML: at 05:08

## 2017-08-06 RX ADMIN — BISACODYL 10 MG: 10 SUPPOSITORY RECTAL at 08:08

## 2017-08-06 RX ADMIN — HYDRALAZINE HYDROCHLORIDE 10 MG: 20 INJECTION INTRAMUSCULAR; INTRAVENOUS at 01:08

## 2017-08-06 RX ADMIN — Medication 3 ML: at 05:08

## 2017-08-06 RX ADMIN — Medication 3 ML: at 02:08

## 2017-08-06 RX ADMIN — PANTOPRAZOLE SODIUM 40 MG: 40 INJECTION, POWDER, FOR SOLUTION INTRAVENOUS at 09:08

## 2017-08-06 RX ADMIN — FUROSEMIDE 10 MG/HR: 10 INJECTION, SOLUTION INTRAMUSCULAR; INTRAVENOUS at 10:08

## 2017-08-06 RX ADMIN — BISACODYL 10 MG: 10 SUPPOSITORY RECTAL at 10:08

## 2017-08-06 RX ADMIN — HYDRALAZINE HYDROCHLORIDE 10 MG: 20 INJECTION INTRAMUSCULAR; INTRAVENOUS at 06:08

## 2017-08-06 NOTE — ASSESSMENT & PLAN NOTE
- CTS Primary  - s/p LVAD HM III (7/27/17), speed @ 5200 rpm  - chest closure (7/28/17)  - extubated (7/29/17)  - on , now off epi , lasix 10.   -I/O/N + 1.2 L     - off cardene, vasopressin gtt, epi   -Anticoagulation per CTS, heparin gtt subtherapeutic ,

## 2017-08-06 NOTE — PROGRESS NOTES
[facility]  Heart Transplant  Progress Note    Patient Name: Suman Hayden  MRN: [id number]  Admission Date: 7/18/2017  Hospital Length of Stay: 19 days  Attending Physician: Sunny Downing MD  Primary Care Provider: Joe Ernst MD  Principal Problem:Acute on chronic combined systolic and diastolic heart failure    Subjective:     Interval History: no flatus since yesterday  OOB to chair   Had been walking floors x 2 yesterday  Nausea and hiccups improved     Continuous Infusions:   amiodarone 0.5 mg/min (08/06/17 0800)    dextrose 5 % and 0.9 % NaCl 80 mL/hr at 08/06/17 0800    DOBUTamine 5 mcg/kg/min (08/06/17 0800)    furosemide (LASIX) 1 mg/mL infusion (non-titrating) 10 mg/hr (08/06/17 0600)    heparin (porcine) in 5 % dex 400 Units/hr (08/06/17 0800)    nicardipine Stopped (08/05/17 0600)     Scheduled Meds:   amlodipine  5 mg Oral Daily    aspirin  300 mg Rectal Daily    pantoprazole  40 mg Intravenous Daily    pravastatin  20 mg Oral QHS    sodium chloride 0.9%  10 mL Intravenous Q6H    sodium chloride 0.9%  3 mL Intravenous Q8H     PRN Meds:albumin human 5%, albuterol sulfate, bisacodyl, fentaNYL, hydrALAZINE, magnesium hydroxide 400 mg/5 ml, magnesium sulfate IVPB, ondansetron, oxycodone, oxycodone, potassium chloride, potassium chloride, Flushing PICC Protocol **AND** sodium chloride 0.9% **AND** sodium chloride 0.9%, sodium phosphate IVPB, sodium phosphate IVPB, sodium phosphate IVPB    Review of patient's allergies indicates:  No Known Allergies  Objective:     Vital Signs (Most Recent):  Temp: 98 °F (36.7 °C) (08/06/17 0700)  Pulse: 80 (08/06/17 0800)  Resp: 19 (08/06/17 0800)  BP: (!) 76/0 (08/06/17 0700)  SpO2: 100 % (08/06/17 0545) Vital Signs (24h Range):  Temp:  [98 °F (36.7 °C)-98.6 °F (37 °C)] 98 °F (36.7 °C)  Pulse:  [] 80  Resp:  [11-28] 19  SpO2:  [95 %-100 %] 100 %  BP: (76-86)/(0) 76/0  Arterial Line BP: ()/(55-81) 82/60     Weight: 85.7 kg (188  lb 15 oz)  Body mass index is 28.73 kg/m².      Intake/Output Summary (Last 24 hours) at 08/06/17 0820  Last data filed at 08/06/17 0555   Gross per 24 hour   Intake          2528.86 ml   Output             1220 ml   Net          1308.86 ml       Hemodynamic Parameters:       Telemetry:     Physical Exam   Constitutional: He is oriented to person, place, and time. He appears well-developed and well-nourished. No distress.   HENT:   Head: Normocephalic.   Eyes: EOM are normal. Pupils are equal, round, and reactive to light.   Neck: No JVD present.   Cardiovascular:   + LVAD hum    Pulmonary/Chest: Effort normal and breath sounds normal. No respiratory distress. He has no wheezes. He has no rales.   Abdominal: Soft.   Musculoskeletal: He exhibits no edema.   Neurological: He is alert and oriented to person, place, and time.   Skin: Skin is warm and dry.   Nursing note and vitals reviewed.      Significant Labs:  CBC:    Recent Labs  Lab 08/06/17 0326   WBC 11.70   RBC 3.26*   HGB 8.9*   HCT 27.0*      MCV 83   MCH 27.3   MCHC 33.0     BNP:    Recent Labs  Lab 08/04/17  0303   BNP 1,381*     CMP:    Recent Labs  Lab 08/04/17  1438  08/06/17  0326   GLU 91  < > 120*   CALCIUM 8.9  < > 8.4*   ALBUMIN 2.9*  --   --    PROT 6.6  --   --      < > 139   K 4.1  < > 4.5   CO2 23  < > 20*     < > 107   BUN 45*  < > 43*   CREATININE 2.1*  < > 1.8*   ALKPHOS 61  --   --    ALT 15  --   --    AST 29  --   --    BILITOT 2.1*  --   --    < > = values in this interval not displayed.   Coagulation:     Recent Labs  Lab 08/06/17  0326   INR 2.7*   APTT 38.5*     LDH:    Recent Labs  Lab 08/04/17  0303 08/05/17  0400 08/06/17  0326   * 278* 307*     Microbiology:  Microbiology Results (last 7 days)     Procedure Component Value Units Date/Time    Urine culture [[id number]] Collected:  08/04/17 1655    Order Status:  Completed Specimen:  Urine from Urine, Catheterized Updated:  08/05/17 2116     Urine Culture,  Routine No growth    Blood culture [[id number]] Collected:  08/04/17 1653    Order Status:  Completed Specimen:  Blood from Peripheral, Wrist, Right Updated:  08/05/17 2012     Blood Culture, Routine No Growth to date     Blood Culture, Routine No Growth to date    Blood culture [[id number]] Collected:  08/04/17 1654    Order Status:  Completed Specimen:  Blood from Peripheral, Wrist, Left Updated:  08/05/17 2012     Blood Culture, Routine No Growth to date     Blood Culture, Routine No Growth to date          I have reviewed all pertinent labs within the past 24 hours.    Estimated Creatinine Clearance: 42.4 mL/min (based on Cr of 1.8).    Diagnostic Results:  I have reviewed and interpreted all pertinent imaging results/findings within the past 24 hours.    Assessment and Plan:     Atrial fibrillation    - c/w ac  - c/w amio gtt        LVAD (left ventricular assist device) present    - LVAD HM III  - Speed 5200  - no events  - LDH increased to 307 Monitor daily  - INR 2.7 off coumadin   - on heparin gtt        Urine culture positive    - afebrile, treated with IV cefipime         Atrial tachycardia    - [id number] - 3  - c/w amiodarone  - c/w heparin gtt        AICD discharge    - appropriate in the setting of VT aggravated by underlying AT/AFL (earlier during the admission)  - 7/28 - setting of AF undersense - device reprogrammed to VVI 80  - tachy therapy turned off  - on amio gtt  - EP planning to do lead revision once INR theraputic         Hepatitis B core antibody positive since 2012    - will defer liver biopsy as CTS not requiring it  - ID consult cleared the patient for sx  - case discussed with hepatology, low suspicion for liver involvement        V-tach    - s/p amiodarone reload - now on amio gtt        Hyperlipidemia    - c/w pravastatin        * Acute on chronic combined systolic and diastolic heart failure    - CTS Primary  - s/p LVAD HM III (7/27/17), speed @ 5200 rpm  - chest closure (7/28/17)  -  extubated (7/29/17)  - on , now off epi , lasix 10.   -I/O/N + 1.2 L     - off cardene, vasopressin gtt, epi   -Anticoagulation per CTS, heparin gtt subtherapeutic ,                    Óscar Gama MD  Heart Transplant  [facility]

## 2017-08-06 NOTE — PT/OT/SLP PROGRESS
Physical Therapy  Treatment    Suman Hayden   MRN: [id number]   Admitting Diagnosis: Acute on chronic combined systolic and diastolic heart failure    PT Received On: 08/06/17  PT Start Time: 0924     PT Stop Time: 1002    PT Total Time (min): 38 min       Billable Minutes:  Gait [id number] min and Therapeutic Activity 15 min    Treatment Type: Treatment  PT/PTA: PT     PTA Visit Number: 0       General Precautions: Standard, LVAD, fall, sternal  Orthopedic Precautions: N/A   Braces:      Do you have any cultural, spiritual, Uatsdin conflicts, given your current situation?: none noted     Subjective:  Communicated with nurse prior to session.      Pain/Comfort  Pain Rating 1: 0/10  Pain Rating Post-Intervention 1: 0/10    Objective:   Patient found with: arterial line, telemetry, pulse ox (continuous), NG tube, chest tube (LVAD)    Functional Mobility:  Bed Mobility:   Rolling/Turning to Left:  (not tested. pt was sitting in chair for treatment. )    Transfers:  Sit <> Stand Assistance: Moderate Assistance (pt needed verbal cues for functional mobility with sternal precautions. )    Gait:   Gait Distance: 66 ft 100 ft, 48 ft with sitting rest periods between distance ambulated. pt had emergency bag present and nursing with portable monitor.   Assistance 1: Total assistance (CGA for gait training and additional assist of 2 for equipment. )  Gait Assistive Device:  (Liven good RW)      Therapeutic Activities and Exercises:  Pt was SBA and verbal cues for switching from LVAD batter to power base after gait training.  OT assisted pt switching from LVAD power base to battery and use of consolidation bag.    AM-PAC 6 CLICK MOBILITY  How much help from another person does this patient currently need?   1 = Unable, Total/Dependent Assistance  2 = A lot, Maximum/Moderate Assistance  3 = A little, Minimum/Contact Guard/Supervision  4 = None, Modified [place]/Independent    Turning over in bed (including adjusting  bedclothes, sheets and blankets)?: 2  Sitting down on and standing up from a chair with arms (e.g., wheelchair, bedside commode, etc.): 2  Moving from lying on back to sitting on the side of the bed?: 2  Moving to and from a bed to a chair (including a wheelchair)?: 2  Need to walk in hospital room?: 2  Climbing 3-5 steps with a railing?: 1  Total Score: 11    AM-PAC Raw Score CMS G-Code Modifier Level of Impairment Assistance   6 % Total / Unable   7 - 9 CM 80 - 100% Maximal Assist   10 - 14 CL 60 - 80% Moderate Assist   15 - 19 CK 40 - 60% Moderate Assist   20 - 22 CJ 20 - 40% Minimal Assist   23 CI 1-20% SBA / CGA   24 CH 0% Independent/ Mod I     Patient left up in chair with all lines intact, call button in reach and wife present.    Assessment:  Suman Hayden is a 67 y.o. 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 Pt would benefit from cont PT to address deficits and will need HHPT when medically stable. Pt will benefit from skilled PT 6x/wk to progress physically and return pt to Independent status. Pt is s/p LVAD HM3 placement 7/27, chest closure 7/28/17. Pt has improved functional mobility with increased distance ambulated.     Rehab identified problem list/impairments: Rehab identified problem list/impairments: weakness, impaired endurance, impaired functional mobilty, gait instability, decreased lower extremity function    Rehab potential is good.    Activity tolerance: Good    Discharge recommendations: Discharge Facility/Level Of Care Needs: home health PT     Barriers to discharge: Barriers to Discharge: None    Equipment recommendations: Equipment Needed After Discharge:  (will determine DME needs closer to discharge. )     GOALS:    Physical Therapy Goals        Problem: Physical Therapy Goal    Goal Priority Disciplines Outcome Goal Variances  Interventions   Physical Therapy Goal     PT/OT, PT Ongoing (interventions implemented as appropriate)     Description:  Goals to be met by: 17     Patient will increase functional independence with mobility by performin. Supine to sit with MInimal Assistance- not met  2. Sit to supine with MInimal Assistance - not met  3. Sit to stand transfer with Minimal Assistance- not met  4. Bed to chair transfer with Minimal Assistance- not met  5. Gait  x 50 feet with Minimal Assistance. - not met  6. Lower extremity exercise program x15 reps, with supervision, in order to increase LE strength and (I) with functional mobility. - not met                       PLAN:    Patient to be seen 6 x/week  to address the above listed problems via gait training, therapeutic activities, therapeutic exercises  Plan of Care expires: 17  Plan of Care reviewed with: patient, spouse         Astridmichael Whaley, PT  2017

## 2017-08-06 NOTE — PROGRESS NOTES
Progress Note  Surgical Intensive Care    Admit Date: 7/18/2017  Post-operative Day: 9 Days Post-Op  Hospital Day: 20    SUBJECTIVE:     Follow-up For:  Procedure(s) (LRB):  CLOSURE-STERNAL WOUND (N/A)  PLACEMENT-NEOPERICARDIUM (N/A)   S/p sternotomy closure 7/18/17    Interval history: VSS. Serosanginous output from NG was noted overnight. H/H stable. Continues to be on Amio, Heparin, Furosemide, Dobutamine gtt. Chest tube with minimal serosanguinous output overnight. UOP adequate.     Continuous Infusions:   amiodarone 0.5 mg/min (08/06/17 0600)    dextrose 5 % and 0.9 % NaCl 80 mL/hr at 08/06/17 0600    DOBUTamine 5 mcg/kg/min (08/06/17 0600)    furosemide (LASIX) 1 mg/mL infusion (non-titrating) 10 mg/hr (08/06/17 0600)    heparin (porcine) in 5 % dex 400 Units/hr (08/06/17 0600)    nicardipine Stopped (08/05/17 0600)     Scheduled Meds:   amlodipine  5 mg Oral Daily    aspirin  300 mg Rectal Daily    pantoprazole  40 mg Intravenous Daily    pravastatin  20 mg Oral QHS    sodium chloride 0.9%  10 mL Intravenous Q6H    sodium chloride 0.9%  3 mL Intravenous Q8H     PRN Meds:albumin human 5%, albuterol sulfate, bisacodyl, fentaNYL, hydrALAZINE, magnesium hydroxide 400 mg/5 ml, magnesium sulfate IVPB, ondansetron, oxycodone, oxycodone, potassium chloride, potassium chloride, Flushing PICC Protocol **AND** sodium chloride 0.9% **AND** sodium chloride 0.9%, sodium phosphate IVPB, sodium phosphate IVPB, sodium phosphate IVPB    Review of patient's allergies indicates:  No Known Allergies    OBJECTIVE:     Vital Signs (Most Recent)  Temp: 98.5 °F (36.9 °C) (08/06/17 0300)  Pulse: 80 (08/06/17 0700)  Resp: (!) 22 (08/06/17 0700)  BP: (!) 76/0 (08/06/17 0700)  SpO2: 100 % (08/06/17 0545)    Vital Signs Range (Last 24H):  Temp:  [98 °F (36.7 °C)-98.6 °F (37 °C)]   Pulse:  []   Resp:  [11-28]   BP: (76-86)/(0)   SpO2:  [95 %-100 %]   Arterial Line BP: ()/(55-81)     I & O (Last  24H):    Intake/Output Summary (Last 24 hours) at 08/06/17 0810  Last data filed at 08/06/17 0555   Gross per 24 hour   Intake          2528.86 ml   Output             1220 ml   Net          1308.86 ml        Physical Exam   Constitutional: He appears well-developed and well-nourished. No distress. Resting comfortably   HENT:   Head: Normocephalic and atraumatic.   Eyes: Right eye exhibits no discharge. Left eye exhibits no discharge. No scleral icterus.   Neck: Normal range of motion. Neck supple.   Cardiovascular: Intact distal pulses.    LVAD hum present.   Pulmonary/Chest: Effort normal. No respiratory distress.   R and L meds chest tubes in place- minimal serosanguinous drainage.  Abdominal: Soft. He exhibits no distension.   Skin: Skin is warm and dry.     Laboratory (Last 24H):  CBC:    Recent Labs  Lab 08/06/17  0326   WBC 11.70   HGB 8.9*   HCT 27.0*        CMP:    Recent Labs  Lab 08/06/17  0326   CALCIUM 8.4*      K 4.5   CO2 20*      BUN 43*   CREATININE 1.8*       ASSESSMENT/PLAN:     Neuro:   - AAOx3  -off sedation  -continue current pain mgmt     Pulmonary:   -Sating well RA  -Chest tubes - R med and 2 L meds. Minimal output     Cardiac:  -MAP range goal >65  -Dobutamine gtt 5  -Amiodarone 6.25  -Off Epi and Cardene gtt    Renal:   -Singer in place  -Bun/Cr stable 43/1.8  -UOP >100cc/hr  -lasix gtt     Fluids/Electrolytes/Nutrition/GI:   -NGT for meds  -replace lytes PRN     Hematology/Oncology:  -on heparin gtt  -Aspirin 325mg     Infectious Disease:   -Afebrile   -WBC trending down 8.9  -cefepime D/C  -Cultures 8/4 NGTD    Endocrine:  -endocrine following  -bg goal 140-180  -off insulin gtt     Dispo:  -Continue care in the ICU setting. Out of bed and in chair and PT    DARIELA Kumar, PGY1   General Surgery- SICU

## 2017-08-06 NOTE — PROGRESS NOTES
[facility]  Heart Transplant  Progress Note    Patient Name: Suman Hayden  MRN: [id number]  Admission Date: 7/18/2017  Hospital Length of Stay: 19 days  Attending Physician: Sunny Downing MD  Primary Care Provider: Joe Ernst MD  Principal Problem:Acute on chronic combined systolic and diastolic heart failure    Subjective:   No new subjective & objective note has been filed under this hospital service since the last note was generated.    Assessment and Plan:     Atrial fibrillation    - c/w ac  - c/w amio gtt        LVAD (left ventricular assist device) present    - LVAD HM III  - Speed 5200  - no events  - LDH increased to 307 Monitor daily  - INR 2.7 off coumadin   - on heparin gtt  -NO off         Urine culture positive    - afebrile, treated with IV cefipime         Atrial tachycardia    - [id number] - 3  - c/w amiodarone  - c/w heparin gtt        AICD discharge    - appropriate in the setting of VT aggravated by underlying AT/AFL (earlier during the admission)  - 7/28 - setting of AF undersense - device reprogrammed to VVI 80  - tachy therapy turned off  - on amio gtt  - EP planning to do lead revision once INR theraputic         Hepatitis B core antibody positive since 2012    - will defer liver biopsy as CTS not requiring it  - ID consult cleared the patient for sx  - case discussed with hepatology, low suspicion for liver involvement        V-tach    - s/p amiodarone reload - now on amio gtt        Hyperlipidemia    - c/w pravastatin        * Acute on chronic combined systolic and diastolic heart failure    - CTS Primary  - s/p LVAD HM III (7/27/17), speed @ 5200 rpm  - chest closure (7/28/17)  - extubated (7/29/17)  - on , now off epi , lasix 10.   -I/O/N + 1.2 L     - off cardene, vasopressin gtt, epi   -Anticoagulation per CTS, heparin gtt subtherapeutic ,                    Óscar Gama MD  Heart Transplant  [facility]

## 2017-08-06 NOTE — ASSESSMENT & PLAN NOTE
- LVAD HM III  - Speed 5200  - no events  - LDH increased to 307 Monitor daily  - INR 2.7 off coumadin   - on heparin gtt

## 2017-08-06 NOTE — PROGRESS NOTES
Daily E and M and VAD Interrogation Note    Reason for Visit:  Patient is seen in follow up for management of:  [] HeartMate II  [] Heartware [] Total artificial heart       [] ECMO           [x] Other - HM III     Interval History:  [] Interval history unobtainable due to intubation.  The [x] implant/[] explant date was 7/27/17    Events - NAEON. Afebrile. . . No BM. Ambulating. On dobutamine 5. Lasix 10/hr.     Review of Systems:   Negative except as above.      Medications:  Current Facility-Administered Medications   Medication Dose Route Frequency Provider Last Rate Last Dose    albumin human 5% bottle 500 mL  500 mL Intravenous PRN Shayne Espinoza MD   500 mL at 07/28/17 1755    albuterol nebulizer solution 2.5 mg  2.5 mg Nebulization Q4H PRN Shayne Espinoza MD        amiodarone 360 mg/200 mL (1.8 mg/mL) infusion  0.5 mg/min Intravenous Continuous Shayne Espinoza MD 16.7 mL/hr at 08/06/17 1600 0.5 mg/min at 08/06/17 1600    amlodipine tablet 5 mg  5 mg Oral Daily Shayne Espinoza MD        aspirin suppository 300 mg  300 mg Rectal Daily Shayne Espinoza MD   300 mg at 08/06/17 0956    bisacodyl suppository 10 mg  10 mg Rectal Daily PRN Shayne Espinoza MD   10 mg at 08/06/17 1020    dextrose 5 % and 0.9 % NaCl infusion   Intravenous Continuous Sunny Downing MD 80 mL/hr at 08/06/17 1600      DOBUtamine 1000 mg in D5W 250 mL infusion (premix non-titrating)  5 mcg/kg/min (Dosing Weight) Intravenous Continuous Shayne Espinoza MD 6 mL/hr at 08/06/17 1600 5 mcg/kg/min at 08/06/17 1600    fentaNYL injection 50 mcg  50 mcg Intravenous Q4H PRN Shayne Espinoza MD   50 mcg at 07/29/17 0827    furosemide (LASIX) 250 mg in sodium chloride 0.9 % 250 mL infusion (non-titrating)  10 mg/hr Intravenous Continuous Sunny Downing MD 10 mL/hr at 08/06/17 1600 10 mg/hr at 08/06/17 1600    heparin 25,000 units in dextrose 5% 250 mL (100 units/mL) infusion (heparin infusion)  400  Units/hr Intravenous Continuous Shayne Espinoza MD 4 mL/hr at 08/06/17 1200 400 Units/hr at 08/06/17 1200    hydrALAZINE injection 10 mg  10 mg Intravenous Q6H PRN Shayne Espinoza MD   10 mg at 08/06/17 0100    magnesium hydroxide 400 mg/5 ml suspension 2,400 mg  30 mL Oral Daily PRN Shayne Espinoza MD        magnesium sulfate 2g in water 50mL IVPB (premix)  2 g Intravenous PRN Shayne Espinoza MD   2 g at 07/30/17 0731    niCARdipine 40 mg/200 mL infusion  1 mg/hr Intravenous Continuous Shayne Espinoza MD   Stopped at 08/05/17 0600    ondansetron injection 4 mg  4 mg Intravenous Q6H PRN Shayne Espinoza MD   4 mg at 08/04/17 0516    oxycodone immediate release tablet 10 mg  10 mg Oral Q4H PRN Shayne Espinoza MD        oxycodone immediate release tablet 5 mg  5 mg Oral Q4H PRN Shayne Espinoza MD   5 mg at 07/30/17 0817    pantoprazole injection 40 mg  40 mg Intravenous Daily Shayne Espinoza MD   40 mg at 08/06/17 0957    potassium chloride 20 mEq in 100 mL IVPB (FOR CENTRAL LINE ADMINISTRATION ONLY)  40 mEq Intravenous PRN Shayne Espinoza MD 50 mL/hr at 08/05/17 2318 40 mEq at 08/05/17 2318    potassium chloride 20 mEq in 100 mL IVPB (FOR CENTRAL LINE ADMINISTRATION ONLY)  40 mEq Intravenous PRN Shayne Espinoza MD 50 mL/hr at 08/04/17 2200 20 mEq at 08/04/17 2200    pravastatin tablet 20 mg  20 mg Oral QHS Lorena Payton MD   Stopped at 08/04/17 2100    sodium chloride 0.9% flush 10 mL  10 mL Intravenous Q6H Sunny Downing MD   10 mL at 08/06/17 0559    And    sodium chloride 0.9% flush 10 mL  10 mL Intravenous PRN Sunny Downing MD        sodium chloride 0.9% flush 3 mL  3 mL Intravenous Q8H Shayne Espinoza MD   3 mL at 08/06/17 0559    sodium phosphate 15 mmol in dextrose 5 % 250 mL IVPB  15 mmol Intravenous PRN Edwige Clay MD 83.3 mL/hr at 07/29/17 2240 15 mmol at 07/29/17 2240    sodium phosphate 20.01 mmol in dextrose 5 % 250 mL IVPB   20.01 mmol Intravenous PRN Edwige Clay MD        sodium phosphate 30 mmol in dextrose 5 % 250 mL IVPB  30 mmol Intravenous PRN Edwige Clay MD         Physical Examination:  Vital Signs:   Vitals:    08/06/17 1600   BP: (!) 76/0   Pulse: 80   Resp: 14   Temp:      Cardiovascular:  [x] Regular rate and rhythm [] Irregular []  None (MATT) []  Other  []  No edema [x]  Edema present  [x]  Clear to auscultation  []  Rales to []  Coarse  []  No rales but   [] Pedal Pulses absent  [x]  Pulses  + throughout  Skin:  Incision is [x]  Clean, dry and intact.  []  Other   Sternum:  [x]  Stable []  Unstable  Driveline(s):   [x]  Clean, dry and intact. []  Other     Labs:  BMP  Lab Results   Component Value Date     08/06/2017    K 4.4 08/06/2017     08/06/2017    CO2 22 (L) 08/06/2017    BUN 47 (H) 08/06/2017    CREATININE 2.0 (H) 08/06/2017    CALCIUM 8.7 08/06/2017    ANIONGAP 12 08/06/2017    ESTGFRAFRICA 38.8 (A) 08/06/2017    EGFRNONAA 33.5 (A) 08/06/2017     Magnesium   Date Value Ref Range Status   08/06/2017 2.5 1.6 - 2.6 mg/dL Final     Lab Results   Component Value Date    WBC 11.70 08/06/2017    HGB 8.9 (L) 08/06/2017    HCT 27.0 (L) 08/06/2017    MCV 83 08/06/2017     08/06/2017     Lab Results   Component Value Date    INR 2.7 (H) 08/06/2017    INR 3.7 (H) 08/05/2017    INR 3.4 (H) 08/04/2017     BNP   Date Value Ref Range Status   08/04/2017 1,381 (H) 0 - 99 pg/mL Final     Comment:     Values of less than 100 pg/ml are consistent with non-CHF populations.   08/02/2017 1,398 (H) 0 - 99 pg/mL Final     Comment:     Values of less than 100 pg/ml are consistent with non-CHF populations.   07/31/2017 1,389 (H) 0 - 99 pg/mL Final     Comment:     Values of less than 100 pg/ml are consistent with non-CHF populations.     LD   Date Value Ref Range Status   08/06/2017 307 (H) 110 - 260 U/L Final     Comment:     Results are increased in hemolyzed samples.   08/05/2017 278 (H) 110 - 260 U/L  Final     Comment:     Results are increased in hemolyzed samples.   08/04/2017 289 (H) 110 - 260 U/L Final     Comment:     Results are increased in hemolyzed samples.     X-Rays:  [x]  I reviewed today's Chest x-ray    Procedure:  Device Interrogation including analysis of device parameters.  Current Settings   [x]  Ventricular Assist Device  []  Total Artificial Heart interrogated  Review of device function is [x]  Stable []  Other   TXP LVAD INTERROGATIONS 8/6/2017 8/6/2017 8/6/2017 8/6/2017 8/6/2017 8/6/2017 8/6/2017   Type HeartMate3 HeartMate3 HeartMate3 HeartMate3 HeartMate3 HeartMate3 HeartMate3   Flow 4.1 3.6 3.9 3.5 3.8 3.8 3.8   Speed 5200 5200 52 5200 5200 5200 5200   PI 4.4 5.1 4.5 5.3 5.0 4.9 4.8   Power ([place]) 3.5 3.5 3.5 3.6 3.8 3.8 3.6   LSL - 4800 - - - 4800 -   Pulsatility - Intermittent pulse - - - Intermittent pulse -       Assessment:  [x]  Primary Cardiomyopathy []  Congestive Heart Failure   []  Atrial Fibrillation []  Ventricular Tachycardia   []  Aftercare cardiac device []  Long term (current) use of anticoagulants   []  Ventilator-associated pneumonia []  Pneumonia viral, unspecified   []  Pneumonia, bacterial, unspecified []  Pneumonia, organism unspecified   []  Hemorrhage of GI tract, unspecified    []  Nosebleed []  Driveline infection   []  Infection VAD device []  New onset of    []        Plan:  [x]  Interval history obtained from ICU attending team member during rounding today  []  VAD/MATT teaching performed with patient  []  Mobilization / Physical Therapy ongoing  []  Anticoagulation []  Ongoing []  Held  []  Studies ordered  [x]   To OR today for closure.       Total time spent was 30 minutes.  Of which more than 50 percent of the care dominated counseling and coordinating care with different team members. The VAD was interrogated and all parameters were WNL and no significant findings were found in the history. All these findings are documented in the note  above.    Neuro:  - Continue current pain control regimen    Resp:  - Extubated   - Minimize supplemental O2 requirements  - Pulm toilet  - Nehemias off  - wean off O2    CV:  - HDS; LVAD numbers stable o/n  -   - Amio gtt  - Dobutamine at 5  - HTN    - cardene gtt  -     Heme:  - Hgb/Hct 8.9/27  - Continue to trend  - INR supertherapeutic at 2.7 - hold Coumadin 2/2 inability to absorb given ileus  - Restart heparin at 400, non-titrating    ID:  - afebrile  - WBC 11.7  - continue to monitor     Renal:  - Singer in place  - Strict I/Os   - Adequate UOP - 890  - Lasix gtt at 10 mg/hr  - Hold diuril   - Cr 1.8    FEN/GI:  - NPO - strict  - NGT to suction  - IVF  - Miralax BID  - Replace lytes PRN    Endo:  - Endocrine following, appreciate assistance  - Accuchecks  - Insulin gtt    Dispo:  - Continue ICU care  - Awaiting bowel function     Pradip Vanegas MD  08/06/2017 4:29 PM    Cardiac Surgery Attending E and M (VAD) Note along with VAD Interrogation    I have seen and examined the patient and agree with the findings above    I also reviewed the patients clinical course and:  [x]  Hemodynamic & Respiratory paramters  [x]  Laboratory Data  [x]  Radiological studies     VAD Interrogated [x]      VAD Function is normal. Changes made []  None [x]        Interrogation of Ventricular assist device was performed with physician analysis of device parameters and review of device function. I have personally reviewed the interrogation findings and agree with findings as stated

## 2017-08-06 NOTE — SUBJECTIVE & OBJECTIVE
Interval History: did have a BM last night     Continuous Infusions:   amiodarone 0.5 mg/min (08/06/17 0800)    dextrose 5 % and 0.9 % NaCl 80 mL/hr at 08/06/17 0800    DOBUTamine 5 mcg/kg/min (08/06/17 0800)    furosemide (LASIX) 1 mg/mL infusion (non-titrating) 10 mg/hr (08/06/17 0600)    heparin (porcine) in 5 % dex 400 Units/hr (08/06/17 0800)    nicardipine Stopped (08/05/17 0600)     Scheduled Meds:   amlodipine  5 mg Oral Daily    aspirin  300 mg Rectal Daily    pantoprazole  40 mg Intravenous Daily    pravastatin  20 mg Oral QHS    sodium chloride 0.9%  10 mL Intravenous Q6H    sodium chloride 0.9%  3 mL Intravenous Q8H     PRN Meds:albumin human 5%, albuterol sulfate, bisacodyl, fentaNYL, hydrALAZINE, magnesium hydroxide 400 mg/5 ml, magnesium sulfate IVPB, ondansetron, oxycodone, oxycodone, potassium chloride, potassium chloride, Flushing PICC Protocol **AND** sodium chloride 0.9% **AND** sodium chloride 0.9%, sodium phosphate IVPB, sodium phosphate IVPB, sodium phosphate IVPB    Review of patient's allergies indicates:  No Known Allergies  Objective:     Vital Signs (Most Recent):  Temp: 98 °F (36.7 °C) (08/06/17 0700)  Pulse: 80 (08/06/17 0800)  Resp: 19 (08/06/17 0800)  BP: (!) 76/0 (08/06/17 0700)  SpO2: 100 % (08/06/17 0545) Vital Signs (24h Range):  Temp:  [98 °F (36.7 °C)-98.6 °F (37 °C)] 98 °F (36.7 °C)  Pulse:  [] 80  Resp:  [11-28] 19  SpO2:  [95 %-100 %] 100 %  BP: (76-86)/(0) 76/0  Arterial Line BP: ()/(55-81) 82/60     Weight: 85.7 kg (188 lb 15 oz)  Body mass index is 28.73 kg/m².      Intake/Output Summary (Last 24 hours) at 08/06/17 0820  Last data filed at 08/06/17 0555   Gross per 24 hour   Intake          2528.86 ml   Output             1220 ml   Net          1308.86 ml       Hemodynamic Parameters:       Telemetry:     Physical Exam   Constitutional: He is oriented to person, place, and time. He appears well-developed and well-nourished. No distress.   HENT:    Head: Normocephalic and atraumatic.   Eyes: EOM are normal. Pupils are equal, round, and reactive to light.   Neck: Normal range of motion. Neck supple. No JVD present.   Cardiovascular: Normal rate and regular rhythm.  Exam reveals no gallop and no friction rub.    No murmur heard.  + LVAD hum    Pulmonary/Chest: Effort normal and breath sounds normal. No respiratory distress. He has no wheezes. He has no rales.   Abdominal: Soft. He exhibits distension (mild). There is no tenderness. There is no guarding.   Musculoskeletal: Normal range of motion. He exhibits no edema.   Neurological: He is alert and oriented to person, place, and time. No cranial nerve deficit. Coordination normal.   Skin: Skin is warm and dry. He is not diaphoretic. No erythema.   Nursing note and vitals reviewed.      Significant Labs:  CBC:    Recent Labs  Lab 08/06/17  0326   WBC 11.70   RBC 3.26*   HGB 8.9*   HCT 27.0*      MCV 83   MCH 27.3   MCHC 33.0     BNP:    Recent Labs  Lab 08/04/17  0303   BNP 1,381*     CMP:    Recent Labs  Lab 08/04/17  1438  08/06/17  0326   GLU 91  < > 120*   CALCIUM 8.9  < > 8.4*   ALBUMIN 2.9*  --   --    PROT 6.6  --   --      < > 139   K 4.1  < > 4.5   CO2 23  < > 20*     < > 107   BUN 45*  < > 43*   CREATININE 2.1*  < > 1.8*   ALKPHOS 61  --   --    ALT 15  --   --    AST 29  --   --    BILITOT 2.1*  --   --    < > = values in this interval not displayed.   Coagulation:     Recent Labs  Lab 08/06/17  0326   INR 2.7*   APTT 38.5*     LDH:    Recent Labs  Lab 08/04/17  0303 08/05/17  0400 08/06/17  0326   * 278* 307*     Microbiology:  Microbiology Results (last 7 days)     Procedure Component Value Units Date/Time    Urine culture [[id number]] Collected:  08/04/17 1655    Order Status:  Completed Specimen:  Urine from Urine, Catheterized Updated:  08/05/17 2116     Urine Culture, Routine No growth    Blood culture [[id number]] Collected:  08/04/17 1653    Order Status:  Completed  Specimen:  Blood from Peripheral, Wrist, Right Updated:  08/05/17 2012     Blood Culture, Routine No Growth to date     Blood Culture, Routine No Growth to date    Blood culture [[id number]] Collected:  08/04/17 1654    Order Status:  Completed Specimen:  Blood from Peripheral, Wrist, Left Updated:  08/05/17 2012     Blood Culture, Routine No Growth to date     Blood Culture, Routine No Growth to date          I have reviewed all pertinent labs within the past 24 hours.    Estimated Creatinine Clearance: 42.4 mL/min (based on Cr of 1.8).    Diagnostic Results:  I have reviewed and interpreted all pertinent imaging results/findings within the past 24 hours.

## 2017-08-06 NOTE — PT/OT/SLP PROGRESS
"Occupational Therapy  Treatment    Suman Hayden   MRN: [id number]   Admitting Diagnosis: Acute on chronic combined systolic and diastolic heart failure    OT Date of Treatment: 08/06/17   OT Start Time: 0859  OT Stop Time: 0924  OT Total Time (min): 25 min    Billable Minutes:  Self Care/Home Management 17 and Therapeutic Activity 8    General Precautions: Standard, LVAD, fall, sternal (cardiac)  Orthopedic Precautions: N/A  Braces: N/A    Subjective:  Communicated with RN/PT prior to session.  "Thank you from the bottom of my heart."  "I get nervous sometimes when I dont know the answer quickly."  Pain/Comfort  Pain Rating 1: 0/10  Pain Rating Post-Intervention 1: 0/10    Objective:  Patient found with: NG tube, peripheral IV, pulse ox (continuous) (LVAD to PM; NG)     Functional Mobility:  Bed Mobility:  (n/a; found OOB and UIC)    Transfers: (n/a; completed with PT)    Functional Ambulation: PT present immediately post-OT treatment to ambulate with PT; please see PT note for gait details.     Activities of Daily Living:  Feeding Level of Assistance: Activity did not occur  UE Dressing Level of Assistance: Moderate assistance (to don consoldiation bag; to don second gown as robe)  LE Dressing Level of Assistance: Total assistance (B socks)  Grooming Position: Seated  Grooming Level of Assistance:  (face washing; VC's to avoid tugging at NG tube)  Toileting Level of Assistance: Activity did not occur (voiced no need)  Bathing Level of Assistance: Activity did not occur    Balance:   Static Sit: FAIR+: Able to take MINIMAL challenges from all directions  Dynamic Sit: FAIR+: Maintains balance through MINIMAL excursions of active trunk motion  Static Stand: n/a  Dynamic stand: n/a    Therapeutic Activities and Exercises:  Pt educated on OT role and POC; encouraged to remain UIC and to call RN for all needs.     LVAD education included:  -correct names of all devices: (controller, clip, battery, consolidation bag, " emergency bag, driveline, power cables); able to recall underlined words without cues; unable to recall any unknown words without Mod/Max cues even after repetitive teaching.   -role of above listed equipment; limited driveline knowledge (thought this was a power cable)  -setting up rotation of batteries (only wife educated on this); no rotation of batteries in terms of placing in emergency bag vs , etc but wife making sure pt rotating to different sets of batteries each day; educated on formal rotation of >emergency bag>wear>. Good understanding of this with all questions answered  -contents of emergency bag; only spare controller present, discussed need to have 2 batteries in this bag  -correct positioning and how to don consolidation bag with straps x2; Total A 2* multiple medical lines at this time, VU that clear window goes on outside  -importance of self-test; how to complete and record self-test data; no initiation from pt without cues and unable to report name of test; wife correctly recording in binder without assist; located self-test button without assist, Min VC's to press and hold button (pt just pressing once); pt appears unable to read (??) and could not read words from screen but did correctly state numbers; wife reports pt has 'trouble with words'   -how to change from PBU to batteries; no physical assist required, VU and DU to check battery level prior to rotating to these, placed into clip without assist.  Min VC's to avoid twisting when tugging to separate power cables and to push cable into clip fully before twisting; did well  -placement of devices into bag/organization of lines in bag; increased time but no assist required, Min VC's to organize lines while avoiding driveline.  Confused on red tab on bag thus educated on this.     AM-PAC 6 CLICK ADL   How much help from another person does this patient currently need?   1 = Unable, Total/Dependent Assistance  2 = A lot,  "Maximum/Moderate Assistance  3 = A little, Minimum/Contact Guard/Supervision  4 = None, Modified [place]eman/Independent    Putting on and taking off regular lower body clothing? : 1  Bathing (including washing, rinsing, drying)?: 1  Toileting, which includes using toilet, bedpan, or urinal? : 2  Putting on and taking off regular upper body clothing?: 2  Taking care of personal grooming such as brushing teeth?: 3  Eating meals?: 3  Total Score: 12     AM-PAC Raw Score CMS "G-Code Modifier Level of Impairment Assistance   6 % Total / Unable   7 - 8 CM 80 - 100% Maximal Assist   9-13 CL 60 - 80% Moderate Assist   14 - 19 CK 40 - 60% Moderate Assist   20 - 22 CJ 20 - 40% Minimal Assist   23 CI 1-20% SBA / CGA   24 CH 0% Independent/ Mod I       Patient left up in chair with all lines intact, call button in reach and PT/wife present    ASSESSMENT:  Suman Hayden is a 67 y.o. male with a medical diagnosis of Acute on chronic combined systolic and diastolic heart failure and presents with good motivation and participation with therapy.  Pt with somewhat flat affect but was open to all education; slightly frustrated when unable to recall names of devices or answers to questions.  Encouraged to remain collected when frustrated with good reception from pt.  Good family support from wife (asking great questions, receptive to feedback, etc).  Pt limited some with ADLs 2* number of medical lines present but expected to improve upon this.  Min VC's required today for adherence of sternal prec's.  Pt progressing with LVAD knowledge and overall management.  OT wondering if SLP consult might assist with improving recall problems (word finding troubles? or just unable to read?).  Skilled OT services remain warranted to maximize functional performance, to decrease caregiver burden, and to improve pt's quality of life via increased (I) as caregiver to self promoting engagement in meaningful tasks.  Good potential remains to " achieve set goals.     Rehab identified problem list/impairments: Rehab identified problem list/impairments: weakness, impaired endurance, impaired self care skills, impaired functional mobilty, gait instability, impaired balance, decreased coordination, decreased upper extremity function, decreased lower extremity function, decreased safety awareness, decreased ROM, impaired coordination, impaired fine motor    Rehab potential is excellent.    Activity tolerance: Good    Discharge recommendations: Discharge Facility/Level Of Care Needs: home health PT, home health OT     Barriers to discharge: Barriers to Discharge: None    Equipment recommendations:       GOALS:    Occupational Therapy Goals        Problem: Occupational Therapy Goal    Goal Priority Disciplines Outcome Interventions   Occupational Therapy Goal     OT, PT/OT Ongoing (interventions implemented as appropriate)    Description:  Goals to be met by:  2 weeks 8/12/17     Patient will increase functional independence with ADLs by performing:  Feeding: Independent   UE Dressing with Supervision.  LE Dressing with Supervision.  Grooming while standing with Supervision.  Toileting from toilet with Supervision for hygiene and clothing management.   Stand pivot transfers with Supervision.  Toilet transfer to toilet with Supervision.  Pt will be independent with LVAD yasmin't.                 Multidisciplinary Problems (Resolved)        Problem: Occupational Therapy Goal    Goal Priority Disciplines Outcome Interventions   Occupational Therapy Goal   (Resolved)     OT, PT/OT  Error    Description:  Goals to be met by: 8/04/2017    Patient will increase functional independence with ADLs by performing:    Increased functional strength to 5/5 for improved ADL performance.  Upper extremity exercise program and R LE ankle pumps  3x 10 reps per handout, with independence.                      Plan:  Patient to be seen 6 x/week to address the above listed  problems via self-care/home management, therapeutic activities, therapeutic exercises, cognitive retraining  Plan of Care expires: 08/29/17  Plan of Care reviewed with: patient, spouse         Geovanna MUSA Mcknight LOTR, SHO  08/06/2017

## 2017-08-06 NOTE — PROGRESS NOTES
Progress Note  EP    Admit Date: 7/18/2017   LOS: 19 days     SUBJECTIVE:     Patient seen and examined. Denies chest pain or shortness of breath. Pt denies any other complaints.     Scheduled Meds:   amlodipine  5 mg Oral Daily    aspirin  300 mg Rectal Daily    pantoprazole  40 mg Intravenous Daily    pravastatin  20 mg Oral QHS    sodium chloride 0.9%  10 mL Intravenous Q6H    sodium chloride 0.9%  3 mL Intravenous Q8H     Continuous Infusions:   amiodarone 0.5 mg/min (08/06/17 0900)    dextrose 5 % and 0.9 % NaCl 80 mL/hr at 08/06/17 0900    DOBUTamine 5 mcg/kg/min (08/06/17 0900)    furosemide (LASIX) 1 mg/mL infusion (non-titrating) 10 mg/hr (08/06/17 0900)    heparin (porcine) in 5 % dex 400 Units/hr (08/06/17 0900)    nicardipine Stopped (08/05/17 0600)     PRN Meds:albumin human 5%, albuterol sulfate, bisacodyl, fentaNYL, hydrALAZINE, magnesium hydroxide 400 mg/5 ml, magnesium sulfate IVPB, ondansetron, oxycodone, oxycodone, potassium chloride, potassium chloride, Flushing PICC Protocol **AND** sodium chloride 0.9% **AND** sodium chloride 0.9%, sodium phosphate IVPB, sodium phosphate IVPB, sodium phosphate IVPB    Review of patient's allergies indicates:  No Known Allergies    OBJECTIVE:     Vital Signs (Most Recent)  Temp: 98 °F (36.7 °C) (08/06/17 0700)  Pulse: 80 (08/06/17 0900)  Resp: (!) 22 (08/06/17 0900)  BP: (!) 76/0 (08/06/17 0700)  SpO2: 98 % (08/06/17 0915)    Vital Signs Range (Last 24H):  Temp:  [98 °F (36.7 °C)-98.6 °F (37 °C)]   Pulse:  []   Resp:  [11-28]   BP: (76-86)/(0)   SpO2:  [95 %-100 %]   Arterial Line BP: ()/(55-81)     I & O (Last 24H):  Intake/Output Summary (Last 24 hours) at 08/06/17 0924  Last data filed at 08/06/17 0700   Gross per 24 hour   Intake          2528.86 ml   Output             1220 ml   Net          1308.86 ml       Tele: sinus    Physical Exam:   General: Patient in no acute distress or discomfort  HEENT: No JVD, moist mucous  membranes  Cardiac: VAD hum  Chest: CTABL, no wheezing or rales  Abd:Soft NTND  Ext: No Edema No swelling  Neuro: A and O X 3, non focal    LABS  CBC with Diff:     Recent Labs  Lab 08/05/17  0400 08/05/17 2212 08/06/17  0326   WBC 13.53* 10.81 11.70   HGB 9.1* 9.0* 8.9*   HCT 26.4* 26.4* 27.0*    317 312   LYMPH 9.7*  1.3 11.1*  1.2 12.3*  1.4   MONO 6.1  0.8 8.2  0.9 7.9  0.9   EOSINOPHIL 1.0 1.2 0.9       COAG:    Recent Labs  Lab 08/04/17 1438 08/05/17 0400 08/05/17 1700 08/05/17 2212 08/06/17  0326   APTT 31.0  31.0  < > 36.8*  36.8*  < > 38.9* 35.9* 38.5*   INR 3.4*  --  3.7*  --   --   --  2.7*   < > = values in this interval not displayed.    CMP:   Recent Labs  Lab 08/02/17  0310  08/04/17  0303  08/04/17 1438 08/05/17 0400 08/05/17 1700 08/05/17 2212 08/06/17  0326     < > 124*  < > 91  < > 115*  115*  < > 205* 112* 120*   CALCIUM 8.8  < > 8.7  < > 8.9  < > 8.6*  8.6*  < > 8.4* 8.4* 8.4*   ALBUMIN 3.0*  --  2.9*  --  2.9*  --   --   --   --   --   --    PROT 6.7  --  6.5  --  6.6  --   --   --   --   --   --    *  < > 135*  < > 136  < > 138  138  < > 139 138 139   K 3.9  < > 3.7  < > 4.1  < > 3.6  3.6  < > 4.0 3.8 4.5   CO2 24  < > 23  < > 23  < > 23  23  < > 21* 23 20*   CL 99  < > 100  < > 101  < > 103  103  < > 105 105 107   BUN 41*  < > 45*  < > 45*  < > 44*  44*  < > 45* 43* 43*   CREATININE 1.8*  < > 2.0*  < > 2.1*  < > 1.9*  1.9*  < > 1.9* 1.8* 1.8*   ALKPHOS 56  --  57  --  61  --   --   --   --   --   --    ALT 22  --  16  --  15  --   --   --   --   --   --    AST 24  --  19  --  29  --   --   --   --   --   --    BILITOT 2.3*  --  1.9*  --  2.1*  --   --   --   --   --   --    MG 2.4  < > 2.4  < > 2.3  < > 2.5  2.5  < > 2.4 2.4 2.5   PHOS 2.9  < > 3.8  --  3.3  --  3.6  --   --   --  3.3   < > = values in this interval not displayed.  Estimated Creatinine Clearance: 42.4 mL/min (based on Cr of 1.8).    .  Recent Labs  Lab 08/04/17  1961   BNP  1,381*       Present on Admission:   Acute on chronic combined systolic and diastolic heart failure   Hyperlipidemia   V-tach   Hepatitis B core antibody positive since 2012   Heart failure      ASSESSMENT / PLAN:   67 year old man with advanced NICM (LVEF 10%) on home , HTN, HLD with [employer] CRT-D who presented 7/18 morning with a syncopal episode during a 6MWT followed by an ICD shock x 2. On interrogation, he had been shocked 5 times since 7/14, and 4 times in last two days prior to admit. He is now s/p HMIII LVAD and RV lead on ICD is not pacing.  ICD is currently off.    Plan  -Tentative plan on Monday for re-interrogating the ICD -- if RV lead still dysfunctional from a P/S point of view, would switch the RV and LV for VT/VF protection.   -Patient is on coumadin and heparin, INR 2.7 today, will need to stop heparin prior to procedure.   -Please keep NPO.    Don Flores MD  Cardiology Fellow  8/6/2017 9:24 AM

## 2017-08-06 NOTE — PLAN OF CARE
Problem: Occupational Therapy Goal  Goal: Occupational Therapy Goal  Goals to be met by:  2 weeks 8/12/17     Patient will increase functional independence with ADLs by performing:  Feeding: Independent   UE Dressing with Supervision.  LE Dressing with Supervision.  Grooming while standing with Supervision.  Toileting from toilet with Supervision for hygiene and clothing management.   Stand pivot transfers with Supervision.  Toilet transfer to toilet with Supervision.  Pt will be independent with LVAD yasmin't.      Outcome: Ongoing (interventions implemented as appropriate)  No goals met this date.  Pt progressing well, goals remain appropriately set.   Geovanna Cruz, MUSA, DELMY, CKTP  8/6/2017

## 2017-08-06 NOTE — ASSESSMENT & PLAN NOTE
- LVAD HM III  - Speed 5200  - no events  - LDH increased to 307 Monitor daily  - INR 2.7 off coumadin   - on heparin gtt  -NO off

## 2017-08-06 NOTE — PROGRESS NOTES
Dr. Soni notified and aware of thin serosanguinous drainage from NG tube. Ordered to collect CBC. Will continue to monitor closely.

## 2017-08-07 LAB
ALBUMIN SERPL BCP-MCNC: 2.8 G/DL
ALP SERPL-CCNC: 63 U/L
ALT SERPL W/O P-5'-P-CCNC: 14 U/L
ANION GAP SERPL CALC-SCNC: 11 MMOL/L
ANION GAP SERPL CALC-SCNC: 11 MMOL/L
ANION GAP SERPL CALC-SCNC: 12 MMOL/L
ANION GAP SERPL CALC-SCNC: 9 MMOL/L
ANION GAP SERPL CALC-SCNC: 9 MMOL/L
APTT BLDCRRT: 35.6 SEC
APTT BLDCRRT: 40.1 SEC
APTT BLDCRRT: 42.6 SEC
AST SERPL-CCNC: 21 U/L
BASOPHILS # BLD AUTO: 0.01 K/UL
BASOPHILS NFR BLD: 0.1 %
BILIRUB DIRECT SERPL-MCNC: 1 MG/DL
BILIRUB SERPL-MCNC: 1.5 MG/DL
BNP SERPL-MCNC: 1361 PG/ML
BUN SERPL-MCNC: 46 MG/DL
BUN SERPL-MCNC: 47 MG/DL
BUN SERPL-MCNC: 48 MG/DL
CALCIUM SERPL-MCNC: 8 MG/DL
CALCIUM SERPL-MCNC: 8.3 MG/DL
CALCIUM SERPL-MCNC: 8.3 MG/DL
CALCIUM SERPL-MCNC: 8.4 MG/DL
CALCIUM SERPL-MCNC: 8.6 MG/DL
CHLORIDE SERPL-SCNC: 105 MMOL/L
CHLORIDE SERPL-SCNC: 105 MMOL/L
CHLORIDE SERPL-SCNC: 106 MMOL/L
CHLORIDE SERPL-SCNC: 107 MMOL/L
CHLORIDE SERPL-SCNC: 107 MMOL/L
CO2 SERPL-SCNC: 20 MMOL/L
CO2 SERPL-SCNC: 21 MMOL/L
CO2 SERPL-SCNC: 22 MMOL/L
CREAT SERPL-MCNC: 1.7 MG/DL
CREAT SERPL-MCNC: 1.7 MG/DL
CREAT SERPL-MCNC: 1.8 MG/DL
CREAT SERPL-MCNC: 1.8 MG/DL
CREAT SERPL-MCNC: 1.9 MG/DL
CRP SERPL-MCNC: 65.4 MG/L
DIASTOLIC DYSFUNCTION: YES
DIFFERENTIAL METHOD: ABNORMAL
EOSINOPHIL # BLD AUTO: 0.1 K/UL
EOSINOPHIL NFR BLD: 0.9 %
ERYTHROCYTE [DISTWIDTH] IN BLOOD BY AUTOMATED COUNT: 16.1 %
EST. GFR  (AFRICAN AMERICAN): 41.3 ML/MIN/1.73 M^2
EST. GFR  (AFRICAN AMERICAN): 44 ML/MIN/1.73 M^2
EST. GFR  (AFRICAN AMERICAN): 44 ML/MIN/1.73 M^2
EST. GFR  (AFRICAN AMERICAN): 47.2 ML/MIN/1.73 M^2
EST. GFR  (AFRICAN AMERICAN): 47.2 ML/MIN/1.73 M^2
EST. GFR  (NON AFRICAN AMERICAN): 35.7 ML/MIN/1.73 M^2
EST. GFR  (NON AFRICAN AMERICAN): 38.1 ML/MIN/1.73 M^2
EST. GFR  (NON AFRICAN AMERICAN): 38.1 ML/MIN/1.73 M^2
EST. GFR  (NON AFRICAN AMERICAN): 40.8 ML/MIN/1.73 M^2
EST. GFR  (NON AFRICAN AMERICAN): 40.8 ML/MIN/1.73 M^2
ESTIMATED PA SYSTOLIC PRESSURE: 25.81
GLUCOSE SERPL-MCNC: 107 MG/DL
GLUCOSE SERPL-MCNC: 115 MG/DL
GLUCOSE SERPL-MCNC: 94 MG/DL
GLUCOSE SERPL-MCNC: 95 MG/DL
GLUCOSE SERPL-MCNC: 99 MG/DL
HCT VFR BLD AUTO: 25.3 %
HGB BLD-MCNC: 8.6 G/DL
INR PPP: 2.5
LDH SERPL L TO P-CCNC: 231 U/L
LYMPHOCYTES # BLD AUTO: 1.3 K/UL
LYMPHOCYTES NFR BLD: 9.2 %
MAGNESIUM SERPL-MCNC: 2.3 MG/DL
MAGNESIUM SERPL-MCNC: 2.5 MG/DL
MCH RBC QN AUTO: 28 PG
MCHC RBC AUTO-ENTMCNC: 34 G/DL
MCV RBC AUTO: 82 FL
MITRAL VALVE MOBILITY: NORMAL
MITRAL VALVE REGURGITATION: ABNORMAL
MONOCYTES # BLD AUTO: 1 K/UL
MONOCYTES NFR BLD: 6.9 %
NEUTROPHILS # BLD AUTO: 11.4 K/UL
NEUTROPHILS NFR BLD: 82.4 %
PHOSPHATE SERPL-MCNC: 4 MG/DL
PLATELET # BLD AUTO: 339 K/UL
PMV BLD AUTO: 10.6 FL
POTASSIUM SERPL-SCNC: 3.6 MMOL/L
POTASSIUM SERPL-SCNC: 3.9 MMOL/L
POTASSIUM SERPL-SCNC: 4 MMOL/L
POTASSIUM SERPL-SCNC: 4 MMOL/L
POTASSIUM SERPL-SCNC: 4.4 MMOL/L
PROT SERPL-MCNC: 6.2 G/DL
PROTHROMBIN TIME: 24.6 SEC
RBC # BLD AUTO: 3.07 M/UL
RETIRED EF AND QEF - SEE NOTES: 10 (ref 55–65)
SODIUM SERPL-SCNC: 137 MMOL/L
SODIUM SERPL-SCNC: 138 MMOL/L
WBC # BLD AUTO: 13.87 K/UL

## 2017-08-07 PROCEDURE — 80048 BASIC METABOLIC PNL TOTAL CA: CPT | Mod: 91

## 2017-08-07 PROCEDURE — 20000000 HC ICU ROOM

## 2017-08-07 PROCEDURE — 63600175 PHARM REV CODE 636 W HCPCS: Performed by: STUDENT IN AN ORGANIZED HEALTH CARE EDUCATION/TRAINING PROGRAM

## 2017-08-07 PROCEDURE — 85730 THROMBOPLASTIN TIME PARTIAL: CPT | Mod: 91

## 2017-08-07 PROCEDURE — 27000248 HC VAD-ADDITIONAL DAY

## 2017-08-07 PROCEDURE — 83615 LACTATE (LD) (LDH) ENZYME: CPT

## 2017-08-07 PROCEDURE — 63600175 PHARM REV CODE 636 W HCPCS: Performed by: THORACIC SURGERY (CARDIOTHORACIC VASCULAR SURGERY)

## 2017-08-07 PROCEDURE — 97530 THERAPEUTIC ACTIVITIES: CPT

## 2017-08-07 PROCEDURE — 86140 C-REACTIVE PROTEIN: CPT

## 2017-08-07 PROCEDURE — 99233 SBSQ HOSP IP/OBS HIGH 50: CPT | Mod: 24,,, | Performed by: THORACIC SURGERY (CARDIOTHORACIC VASCULAR SURGERY)

## 2017-08-07 PROCEDURE — 25000003 PHARM REV CODE 250: Performed by: THORACIC SURGERY (CARDIOTHORACIC VASCULAR SURGERY)

## 2017-08-07 PROCEDURE — 99233 SBSQ HOSP IP/OBS HIGH 50: CPT | Mod: ,,, | Performed by: INTERNAL MEDICINE

## 2017-08-07 PROCEDURE — 93750 INTERROGATION VAD IN PERSON: CPT | Mod: ,,, | Performed by: THORACIC SURGERY (CARDIOTHORACIC VASCULAR SURGERY)

## 2017-08-07 PROCEDURE — 25000003 PHARM REV CODE 250: Performed by: STUDENT IN AN ORGANIZED HEALTH CARE EDUCATION/TRAINING PROGRAM

## 2017-08-07 PROCEDURE — 83735 ASSAY OF MAGNESIUM: CPT | Mod: 91

## 2017-08-07 PROCEDURE — A4216 STERILE WATER/SALINE, 10 ML: HCPCS | Performed by: STUDENT IN AN ORGANIZED HEALTH CARE EDUCATION/TRAINING PROGRAM

## 2017-08-07 PROCEDURE — 97116 GAIT TRAINING THERAPY: CPT

## 2017-08-07 PROCEDURE — 94761 N-INVAS EAR/PLS OXIMETRY MLT: CPT

## 2017-08-07 PROCEDURE — A4216 STERILE WATER/SALINE, 10 ML: HCPCS | Performed by: THORACIC SURGERY (CARDIOTHORACIC VASCULAR SURGERY)

## 2017-08-07 PROCEDURE — 84100 ASSAY OF PHOSPHORUS: CPT

## 2017-08-07 PROCEDURE — 99223 1ST HOSP IP/OBS HIGH 75: CPT | Mod: GC,,, | Performed by: SURGERY

## 2017-08-07 PROCEDURE — 80076 HEPATIC FUNCTION PANEL: CPT

## 2017-08-07 PROCEDURE — 85025 COMPLETE CBC W/AUTO DIFF WBC: CPT

## 2017-08-07 PROCEDURE — 99232 SBSQ HOSP IP/OBS MODERATE 35: CPT | Mod: GC,,, | Performed by: INTERNAL MEDICINE

## 2017-08-07 PROCEDURE — 83880 ASSAY OF NATRIURETIC PEPTIDE: CPT

## 2017-08-07 PROCEDURE — 85610 PROTHROMBIN TIME: CPT

## 2017-08-07 PROCEDURE — 93306 TTE W/DOPPLER COMPLETE: CPT | Mod: 26,,, | Performed by: INTERNAL MEDICINE

## 2017-08-07 PROCEDURE — 93750 INTERROGATION VAD IN PERSON: CPT | Mod: ,,, | Performed by: INTERNAL MEDICINE

## 2017-08-07 PROCEDURE — C9113 INJ PANTOPRAZOLE SODIUM, VIA: HCPCS | Performed by: STUDENT IN AN ORGANIZED HEALTH CARE EDUCATION/TRAINING PROGRAM

## 2017-08-07 PROCEDURE — 27200188 HC TRANSDUCER, ART ADULT/PEDS

## 2017-08-07 PROCEDURE — 93306 TTE W/DOPPLER COMPLETE: CPT

## 2017-08-07 RX ORDER — ACETAMINOPHEN 325 MG/1
650 TABLET ORAL EVERY 6 HOURS PRN
Status: DISCONTINUED | OUTPATIENT
Start: 2017-08-07 | End: 2017-08-16

## 2017-08-07 RX ORDER — WARFARIN 1 MG/1
1 TABLET ORAL ONCE
Status: COMPLETED | OUTPATIENT
Start: 2017-08-07 | End: 2017-08-07

## 2017-08-07 RX ORDER — CEFAZOLIN SODIUM 2 G/50ML
2 SOLUTION INTRAVENOUS
Status: DISCONTINUED | OUTPATIENT
Start: 2017-08-07 | End: 2017-08-08

## 2017-08-07 RX ADMIN — Medication 10 ML: at 05:08

## 2017-08-07 RX ADMIN — Medication 3 ML: at 06:08

## 2017-08-07 RX ADMIN — AMIODARONE HYDROCHLORIDE 0.5 MG/MIN: 1.8 INJECTION, SOLUTION INTRAVENOUS at 12:08

## 2017-08-07 RX ADMIN — AMIODARONE HYDROCHLORIDE 0.5 MG/MIN: 1.8 INJECTION, SOLUTION INTRAVENOUS at 11:08

## 2017-08-07 RX ADMIN — DOBUTAMINE HYDROCHLORIDE 5 MCG/KG/MIN: 400 INJECTION INTRAVENOUS at 06:08

## 2017-08-07 RX ADMIN — Medication 3 ML: at 01:08

## 2017-08-07 RX ADMIN — Medication 10 ML: at 06:08

## 2017-08-07 RX ADMIN — FUROSEMIDE 10 MG/HR: 10 INJECTION, SOLUTION INTRAMUSCULAR; INTRAVENOUS at 07:08

## 2017-08-07 RX ADMIN — HEPARIN SODIUM AND DEXTROSE 600 UNITS/HR: 10000; 5 INJECTION INTRAVENOUS at 06:08

## 2017-08-07 RX ADMIN — WARFARIN SODIUM 1 MG: 1 TABLET ORAL at 05:08

## 2017-08-07 RX ADMIN — PANTOPRAZOLE SODIUM 40 MG: 40 INJECTION, POWDER, FOR SOLUTION INTRAVENOUS at 08:08

## 2017-08-07 RX ADMIN — Medication 10 ML: at 12:08

## 2017-08-07 RX ADMIN — PRAVASTATIN SODIUM 20 MG: 20 TABLET ORAL at 09:08

## 2017-08-07 RX ADMIN — POTASSIUM CHLORIDE 40 MEQ: 200 INJECTION, SOLUTION INTRAVENOUS at 06:08

## 2017-08-07 RX ADMIN — HEPARIN SODIUM AND DEXTROSE 600 UNITS/HR: 10000; 5 INJECTION INTRAVENOUS at 12:08

## 2017-08-07 RX ADMIN — ASPIRIN 300 MG: 300 SUPPOSITORY RECTAL at 08:08

## 2017-08-07 RX ADMIN — FENTANYL CITRATE 50 MCG: 50 INJECTION INTRAMUSCULAR; INTRAVENOUS at 10:08

## 2017-08-07 RX ADMIN — Medication 3 ML: at 10:08

## 2017-08-07 NOTE — PROGRESS NOTES
Progress Note  Surgical Intensive Care    Admit Date: 7/18/2017  Post-operative Day: 10 Days Post-Op  Hospital Day: 21    SUBJECTIVE:     Follow-up For:  Procedure(s) (LRB):  CLOSURE-STERNAL WOUND (N/A)  PLACEMENT-NEOPERICARDIUM (N/A)   S/p sternotomy closure 7/18/17    Interval history: VSS. Serosanginous output from NG was noted overnight. H/H stable. Continues to be on Amio, Heparin, Furosemide, Dobutamine gtt. Chest tube with minimal serosanguinous output overnight. UOP adequate.     Continuous Infusions:   amiodarone 0.5 mg/min (08/07/17 1600)    dextrose 5 % and 0.9 % NaCl 80 mL/hr at 08/07/17 1600    DOBUTamine 5 mcg/kg/min (08/07/17 1600)    furosemide (LASIX) 1 mg/mL infusion (non-titrating) 10 mg/hr (08/07/17 1600)    heparin (porcine) in 5 % dex 600 Units/hr (08/07/17 1600)    nicardipine Stopped (08/05/17 0600)     Scheduled Meds:   amlodipine  5 mg Oral Daily    aspirin  300 mg Rectal Daily    ceFAZolin (ANCEF) IVPB  2 g Intravenous Q8H    ceFAZolin 2 g/50mL Dextrose IVPB  2 g Intravenous 30 Min Pre-Op    pantoprazole  40 mg Intravenous Daily    pravastatin  20 mg Oral QHS    sodium chloride 0.9%  10 mL Intravenous Q6H    sodium chloride 0.9%  3 mL Intravenous Q8H    warfarin  1 mg Oral Once     PRN Meds:acetaminophen, albumin human 5%, albuterol sulfate, bisacodyl, fentaNYL, hydrALAZINE, magnesium hydroxide 400 mg/5 ml, magnesium sulfate IVPB, ondansetron, potassium chloride, potassium chloride, Flushing PICC Protocol **AND** sodium chloride 0.9% **AND** sodium chloride 0.9%, sodium phosphate IVPB, sodium phosphate IVPB, sodium phosphate IVPB    Review of patient's allergies indicates:  No Known Allergies    OBJECTIVE:     Vital Signs (Most Recent)  Temp: 98.6 °F (37 °C) (08/07/17 1500)  Pulse: 80 (08/07/17 1600)  Resp: 19 (08/07/17 1600)  BP: (!) 66/0 (08/07/17 1200)  SpO2: 98 % (08/07/17 1600)    Vital Signs Range (Last 24H):  Temp:  [98.3 °F (36.8 °C)-98.7 °F (37.1 °C)]   Pulse:   [79-86]   Resp:  [11-22]   BP: (66-79)/(0)   SpO2:  [96 %-100 %]   Arterial Line BP: (67-99)/(51-76)     I & O (Last 24H):    Intake/Output Summary (Last 24 hours) at 08/07/17 1648  Last data filed at 08/07/17 1600   Gross per 24 hour   Intake             1861 ml   Output             2070 ml   Net             -209 ml        Physical Exam   Constitutional: He appears well-developed and well-nourished. No distress. Resting comfortably   HENT:   Head: Normocephalic and atraumatic.   Eyes: Right eye exhibits no discharge. Left eye exhibits no discharge. No scleral icterus.   Neck: Normal range of motion. Neck supple.   Cardiovascular: Intact distal pulses.    LVAD hum present.   Pulmonary/Chest: Effort normal. No respiratory distress.   R and L meds chest tubes in place- minimal serosanguinous drainage.  Abdominal: Soft. He exhibits no distension.   Skin: Skin is warm and dry.     Laboratory (Last 24H):  CBC:    Recent Labs  Lab 08/07/17  0418   WBC 13.87*   HGB 8.6*   HCT 25.3*        CMP:    Recent Labs  Lab 08/07/17  0418 08/07/17  1010   CALCIUM 8.3* 8.4*   ALBUMIN 2.8*  --    PROT 6.2  --     138   K 3.9 4.4   CO2 20* 22*    107   BUN 47* 47*   CREATININE 1.9* 1.8*   ALKPHOS 63  --    ALT 14  --    AST 21  --    BILITOT 1.5*  --        ASSESSMENT/PLAN:     Neuro:   - AAOx3  -off sedation  -continue current pain mgmt     Pulmonary:   -Sating well RA  -Chest tubes - R med and 2 L meds. Minimal output     Cardiac:  -MAP range goal >65  -Dobutamine gtt 5  -Amiodarone 6.25  -Off Epi and Cardene gtt    Renal:   -Singer in place  -Bun/Cr stable 43/1.8  -UOP >100cc/hr  -lasix gtt     Fluids/Electrolytes/Nutrition/GI:   -NGT pulled.   - Advance diet as tolerates     Hematology/Oncology:  -on heparin gtt  -Aspirin 325mg     Infectious Disease:   -Afebrile   -WBC trending down 8.9  -cefepime D/C  -Cultures 8/4 NGTD    Endocrine:  -endocrine following  -bg goal 140-180  -off insulin gtt     Dispo:  -Continue  care in the ICU setting. Out of bed and in chair and PT. Likely stepdown today or tomorrow.     Avery Crystal  CA2  P: [phone]

## 2017-08-07 NOTE — PLAN OF CARE
Problem: Patient Care Overview  Goal: Plan of Care Review  Outcome: Ongoing (interventions implemented as appropriate)  Pt vitals stable throughout shift. Pt OOB to chair for majority of day. Lasix, heparin, dobutamine, amio gtts infusing per order. VAD values consistent during day. Pt walked with PT. Voids spontaneously and without difficulty to urinal. NGT removed. Lead revision procedure rescheduled. Pt and spouse updated on POC. RN at bs to monitor and answer all questions. See flowsheets for full assessment details

## 2017-08-07 NOTE — PROCEDURES
Patient AAO with no family at bedside. VAD interrogation completed this AM in the event changes needed to be made. Will continue to monitor for further issues.     Pulsatile: Yes   VAD Sounds: HM3  Smooth  Problems / Issues / Alarms with VAD if any: None noted  HCT: 25   Modular Cable Connection Intact(no yellow exposed): YES     VAD Interrogation:  TXP LVAD INTERROGATIONS 8/7/2017 8/7/2017 8/7/2017 8/7/2017 8/7/2017 8/7/2017 8/7/2017   Type HeartMate3 HeartMate3 HeartMate3 HeartMate3 HeartMate3 HeartMate3 HeartMate3   Flow 4 3.9 4.3 4 4 4 4   Speed 5200 5200 5200 5200 5200 5200 5200   PI 3.2 3.4 3.1 3.8 3.9 4.1 3.6   Power ([place]) 3.5 3.6 3.6 3.5 3.5 3.6 3.5   LSL - 4800 - - - 4800 -   Pulsatility - Intermittent pulse - - - Intermittent pulse -   }

## 2017-08-07 NOTE — PROGRESS NOTES
[facility]  Cardiac Electrophysiology  Progress Note    Admission Date: 7/18/2017  Code Status: Prior   Attending Physician: Sunny Downing MD   Expected Discharge Date: 8/16/2017  Principal Problem:Acute on chronic combined systolic and diastolic heart failure    Subjective:     Interval History: no acute events overnight.  Persistent atrial fibrillation on tele    Review of Systems   Constitution: Negative.   HENT: Negative.    Eyes: Negative.    Cardiovascular: Negative.    Respiratory: Negative.    Endocrine: Negative.    Skin: Negative.    Musculoskeletal: Negative.    Gastrointestinal: Negative.    Genitourinary: Negative.    Neurological: Negative.      Objective:     Vital Signs (Most Recent):  Temp: 97.5 °F (36.4 °C) (08/05/17 0700)  Pulse: 94 (08/05/17 1230)  Resp: 17 (08/05/17 1230)  BP: (!) 94/0 (08/05/17 0700)  SpO2: 100 % (08/05/17 0700) Vital Signs (24h Range):  Temp:  [97.5 °F (36.4 °C)-97.9 °F (36.6 °C)] 97.5 °F (36.4 °C)  Pulse:  [] 94  Resp:  [10-25] 17  SpO2:  [98 %-100 %] 100 %  BP: (76-94)/(0) 94/0  Arterial Line BP: ()/(40-81) 77/65     Weight: 83.5 kg (184 lb 1.4 oz)  Body mass index is 27.99 kg/m².     SpO2: 100 %  O2 Device (Oxygen Therapy): nasal cannula    Physical Exam   Constitutional: He is oriented to person, place, and time. He appears well-developed and well-nourished.   HENT:   Head: Normocephalic and atraumatic.   Eyes: Conjunctivae and EOM are normal. Pupils are equal, round, and reactive to light.   Neck: Normal range of motion. Neck supple. No JVD present.   Cardiovascular:   Smooth vad sounds     Pulmonary/Chest: Effort normal and breath sounds normal. No respiratory distress. He has no wheezes. He has no rales. He exhibits no tenderness.   Abdominal: Soft. Bowel sounds are normal. He exhibits no distension. There is no tenderness.   Musculoskeletal: Normal range of motion. He exhibits no edema or tenderness.   Neurological: He is alert and  oriented to person, place, and time.   Skin: Skin is warm and dry. No erythema. No pallor.       Significant Labs: EP:   Recent Labs  Lab 08/04/17  0303  08/04/17  1438 08/04/17  2130 08/05/17  0400 08/05/17  1010   *  < > 136 137 138  138 138   K 3.7  < > 4.1 3.6 3.6  3.6 3.7     < > 101 103 103  103 105   CO2 23  < > 23 22* 23  23 23   *  < > 91 108 115*  115* 114*   BUN 45*  < > 45* 45* 44*  44* 42*   CREATININE 2.0*  < > 2.1* 2.0* 1.9*  1.9* 1.8*   CALCIUM 8.7  < > 8.9 8.5* 8.6*  8.6* 8.4*   PROT 6.5  --  6.6  --   --   --    ALBUMIN 2.9*  --  2.9*  --   --   --    BILITOT 1.9*  --  2.1*  --   --   --    ALKPHOS 57  --  61  --   --   --    AST 19  --  29  --   --   --    ALT 16  --  15  --   --   --    ANIONGAP 12  < > 12 12 12  12 10   ESTGFRAFRICA 38.8*  < > 36.6* 38.8* 41.3*  41.3* 44.0*   EGFRNONAA 33.5*  < > 31.6* 33.5* 35.7*  35.7* 38.1*   WBC 11.36  --  18.33*  --  13.53*  --    HGB 9.8*  --  10.0*  --  9.1*  --    HCT 28.4*  --  28.7*  --  26.4*  --      --  290  --  269  --    INR 2.9*  --  3.4*  --  3.7*  --    < > = values in this interval not displayed.    Significant Imaging: Echocardiogram:   2D echo with color flow doppler:   Results for orders placed or performed during the hospital encounter of 07/18/17   2D echo with color flow doppler   Result Value Ref Range    Est. PA Systolic Pressure 33.87     Tricuspid Valve Regurgitation MILD      Assessment and Plan:   Mr. Hayden is a 67 year old man with advanced NICM (LVEF 10%) on home , HTN, HLD with [employer] CRT-D who presented 7/18 morning with a syncopal episode during a 6MWT followed by an ICD shock x 2. On interrogation, he had been shocked 5 times since 7/14, and 4 times in last two days prior to admit. He is now s/p HMIII LVAD and RV lead on ICD is not pacing.  ICD is currently off.    AICD (automatic cardioverter/defibrillator) present    -bridging to coumadin, INR 2.5.  -Per staff, will likely switch  the RV and LV ports as he does not need to BiV pace but simply sense for VT/VF protection   -ICD is off but patient has pads to chest wall in case it is needed  -has frequent afib with PVCs at his baseline and this continues  -Has LVAD (HMIII) in place at this time  -procedure will likely be done today, patient is NPO except meds        AICD discharge    --PO amiodarone 400mg BIDx 2 weeks then likely 400mg daily thereafter  -- heparin gtt         V-tach    Currently on amiodarone 400mg po BID   Continue Mg and K repletion maintaining K>4, Mg>2            Casey Newman MD  Cardiac Electrophysiology  [facility]

## 2017-08-07 NOTE — PROGRESS NOTES
Daily E and M and VAD Interrogation Note    Reason for Visit:  Patient is seen in follow up for management of:  [] HeartMate II  [] Heartware [] Total artificial heart       [] ECMO           [x] Other - HM III     Interval History:  [] Interval history unobtainable due to intubation.  The [x] implant/[] explant date was 7/27/17    Events - NAEON. Afebrile. . . Multiple BMs o/n. Ambulating. On dobutamine 5. Lasix 10/hr.     Review of Systems:   Negative except as above.      Medications:  Current Facility-Administered Medications   Medication Dose Route Frequency Provider Last Rate Last Dose    albumin human 5% bottle 500 mL  500 mL Intravenous PRN Shayne Espinoza MD   500 mL at 07/28/17 1755    albuterol nebulizer solution 2.5 mg  2.5 mg Nebulization Q4H PRN Shayne Espinoza MD        amiodarone 360 mg/200 mL (1.8 mg/mL) infusion  0.5 mg/min Intravenous Continuous Shayne Espinoza MD 16.7 mL/hr at 08/07/17 1000 0.5 mg/min at 08/07/17 1000    amlodipine tablet 5 mg  5 mg Oral Daily Shayne Espinoza MD        aspirin suppository 300 mg  300 mg Rectal Daily Shayne Espinoza MD   300 mg at 08/07/17 0830    bisacodyl suppository 10 mg  10 mg Rectal Daily PRN Shayne Espinoza MD   10 mg at 08/06/17 2036    cefazolin (ANCEF) 2 gram in dextrose 5% 50 mL IVPB (premix)  2 g Intravenous Q8H Casey Newman MD        cefazolin (ANCEF) 2 gram in dextrose 5% 50 mL IVPB (premix)  2 g Intravenous 30 Min Pre-Op Casey Newman MD        dextrose 5 % and 0.9 % NaCl infusion   Intravenous Continuous Sunny Downing MD 80 mL/hr at 08/07/17 1000      DOBUtamine 1000 mg in D5W 250 mL infusion (premix non-titrating)  5 mcg/kg/min (Dosing Weight) Intravenous Continuous Shayne Espinoza MD 6 mL/hr at 08/07/17 1000 5 mcg/kg/min at 08/07/17 1000    fentaNYL injection 50 mcg  50 mcg Intravenous Q4H PRN Shayne Espinoza MD   50 mcg at 07/29/17 0827    furosemide (LASIX) 250 mg in sodium  chloride 0.9 % 250 mL infusion (non-titrating)  10 mg/hr Intravenous Continuous Sunny Downing MD 10 mL/hr at 08/07/17 1000 10 mg/hr at 08/07/17 1000    heparin 25,000 units in dextrose 5% 250 mL (100 units/mL) infusion (heparin infusion)  600 Units/hr Intravenous Continuous Bon Vanegas MD   Stopped at 08/07/17 0930    hydrALAZINE injection 10 mg  10 mg Intravenous Q6H PRN Shayne Espinoza MD   10 mg at 08/06/17 1812    magnesium hydroxide 400 mg/5 ml suspension 2,400 mg  30 mL Oral Daily PRN Shayne Espinoza MD        magnesium sulfate 2g in water 50mL IVPB (premix)  2 g Intravenous PRN Shayne Espinoza MD   2 g at 07/30/17 0731    niCARdipine 40 mg/200 mL infusion  1 mg/hr Intravenous Continuous Shayne Espinoza MD   Stopped at 08/05/17 0600    ondansetron injection 4 mg  4 mg Intravenous Q6H PRN Shayne Espinoza MD   4 mg at 08/04/17 0516    oxycodone immediate release tablet 10 mg  10 mg Oral Q4H PRN Shayne Espinoza MD        oxycodone immediate release tablet 5 mg  5 mg Oral Q4H PRN Shayne Espinoza MD   5 mg at 07/30/17 0817    pantoprazole injection 40 mg  40 mg Intravenous Daily Shayne Espinoza MD   40 mg at 08/07/17 0800    potassium chloride 20 mEq in 100 mL IVPB (FOR CENTRAL LINE ADMINISTRATION ONLY)  40 mEq Intravenous PRN Shayne Espinoza MD 50 mL/hr at 08/07/17 0610 40 mEq at 08/07/17 0610    potassium chloride 20 mEq in 100 mL IVPB (FOR CENTRAL LINE ADMINISTRATION ONLY)  40 mEq Intravenous PRN Shayne Espinoza MD 50 mL/hr at 08/04/17 2200 20 mEq at 08/04/17 2200    pravastatin tablet 20 mg  20 mg Oral QHS Lorena Payton MD   Stopped at 08/04/17 2100    sodium chloride 0.9% flush 10 mL  10 mL Intravenous Q6H Sunny Downing MD   10 mL at 08/07/17 0611    And    sodium chloride 0.9% flush 10 mL  10 mL Intravenous PRN Sunny Downing MD        sodium chloride 0.9% flush 3 mL  3 mL Intravenous Q8H Shayne Espinoza MD   3 mL at 08/07/17 0611     sodium phosphate 15 mmol in dextrose 5 % 250 mL IVPB  15 mmol Intravenous PRN Edwige Clay MD 83.3 mL/hr at 07/29/17 2240 15 mmol at 07/29/17 2240    sodium phosphate 20.01 mmol in dextrose 5 % 250 mL IVPB  20.01 mmol Intravenous PRN Edwige Clay MD        sodium phosphate 30 mmol in dextrose 5 % 250 mL IVPB  30 mmol Intravenous PRN Edwige Clay MD         Physical Examination:  Vital Signs:   Vitals:    08/07/17 1000   BP:    Pulse: 81   Resp: 18   Temp:      Cardiovascular:  [x] Regular rate and rhythm [] Irregular []  None (MATT) []  Other  []  No edema [x]  Edema present  [x]  Clear to auscultation  []  Rales to []  Coarse  []  No rales but   [] Pedal Pulses absent  [x]  Pulses  + throughout  Skin:  Incision is [x]  Clean, dry and intact.  []  Other   Sternum:  [x]  Stable []  Unstable  Driveline(s):   [x]  Clean, dry and intact. []  Other     Labs:  BMP  Lab Results   Component Value Date     08/07/2017    K 3.9 08/07/2017     08/07/2017    CO2 20 (L) 08/07/2017    BUN 47 (H) 08/07/2017    CREATININE 1.9 (H) 08/07/2017    CALCIUM 8.3 (L) 08/07/2017    ANIONGAP 12 08/07/2017    ESTGFRAFRICA 41.3 (A) 08/07/2017    EGFRNONAA 35.7 (A) 08/07/2017     Magnesium   Date Value Ref Range Status   08/07/2017 2.5 1.6 - 2.6 mg/dL Final     Lab Results   Component Value Date    WBC 13.87 (H) 08/07/2017    HGB 8.6 (L) 08/07/2017    HCT 25.3 (L) 08/07/2017    MCV 82 08/07/2017     08/07/2017     Lab Results   Component Value Date    INR 2.5 (H) 08/07/2017    INR 2.7 (H) 08/06/2017    INR 3.7 (H) 08/05/2017     BNP   Date Value Ref Range Status   08/07/2017 1,361 (H) 0 - 99 pg/mL Final     Comment:     Values of less than 100 pg/ml are consistent with non-CHF populations.   08/04/2017 1,381 (H) 0 - 99 pg/mL Final     Comment:     Values of less than 100 pg/ml are consistent with non-CHF populations.   08/02/2017 1,398 (H) 0 - 99 pg/mL Final     Comment:     Values of less than 100 pg/ml  are consistent with non-CHF populations.     LD   Date Value Ref Range Status   08/07/2017 231 110 - 260 U/L Final     Comment:     Results are increased in hemolyzed samples.   08/06/2017 307 (H) 110 - 260 U/L Final     Comment:     Results are increased in hemolyzed samples.   08/05/2017 278 (H) 110 - 260 U/L Final     Comment:     Results are increased in hemolyzed samples.     X-Rays:  [x]  I reviewed today's Chest x-ray    Procedure:  Device Interrogation including analysis of device parameters.  Current Settings   [x]  Ventricular Assist Device  []  Total Artificial Heart interrogated  Review of device function is [x]  Stable []  Other   TXP LVAD INTERROGATIONS 8/7/2017 8/7/2017 8/7/2017 8/7/2017 8/7/2017 8/7/2017 8/7/2017   Type HeartMate3 HeartMate3 HeartMate3 HeartMate3 HeartMate3 HeartMate3 HeartMate3   Flow 4 4 3.9 3.9 3.9 3.8 4.0   Speed 5200 5200 5200 5200 5200 5200 5200   PI 3.6 4.5 4.2 4.2 4.4 3.9 4.0   Power ([place]) 3.5 3.5 3.6 3.6 3.6 3.6 3.6   LSL - - - 4800 - - -   Pulsatility - - - Intermittent pulse - - -       Assessment:  [x]  Primary Cardiomyopathy []  Congestive Heart Failure   []  Atrial Fibrillation []  Ventricular Tachycardia   []  Aftercare cardiac device []  Long term (current) use of anticoagulants   []  Ventilator-associated pneumonia []  Pneumonia viral, unspecified   []  Pneumonia, bacterial, unspecified []  Pneumonia, organism unspecified   []  Hemorrhage of GI tract, unspecified    []  Nosebleed []  Driveline infection   []  Infection VAD device []  New onset of    []        Plan:  [x]  Interval history obtained from ICU attending team member during rounding today  []  VAD/MATT teaching performed with patient  []  Mobilization / Physical Therapy ongoing  []  Anticoagulation []  Ongoing []  Held  []  Studies ordered  [x]   To OR today for closure.       Total time spent was 30 minutes.  Of which more than 50 percent of the care dominated counseling and coordinating care with  different team members. The VAD was interrogated and all parameters were WNL and no significant findings were found in the history. All these findings are documented in the note above.    Neuro:  - Continue current pain control regimen    Resp:  - Extubated   - Minimize supplemental O2 requirements  - Pulm toilet  - wean off O2    CV:  - HDS; LVAD numbers stable o/n  -   - Amio gtt  - Dobutamine at 5  - HTN   -     Heme:  - Hgb/Hct 8.6/25  - Continue to trend  - INR therapeutic at 2.5 - Coumadin 1 mg tonight  - Heparin at 600, non-titrating    ID:  - afebrile  - WBC 13.9  - continue to monitor     Renal:  - Singer in place  - Strict I/Os   - Adequate UOP  - Lasix gtt at 10 mg/hr  - Hold diuril   - Cr 1.9    FEN/GI:  - NPO - CLD tonight if tolerated clamp trial   - NGT clamp trial   - IVF  - Miralax BID  - Replace lytes PRN    Endo:  - Endocrine following, appreciate assistance  - Accuchecks  - Insulin gtt    Dispo:  - Continue ICU care  - NGT clamp trial   - Possible stepdown tomorrow       Shayne Espinoza MD  General Surgery PGY-2  Pager: [phone]      Cardiac Surgery Attending E and M (VAD) Note along with VAD Interrogation    I have seen and examined the patient and agree with the findings above    I also reviewed the patients clinical course and:  [x]  Hemodynamic & Respiratory paramters  [x]  Laboratory Data  [x]  Radiological studies     VAD Interrogated [x]      VAD Function is normal. Changes made []  None [x]        Interrogation of Ventricular assist device was performed with physician analysis of device parameters and review of device function. I have personally reviewed the interrogation findings and agree with findings as stated

## 2017-08-07 NOTE — SUBJECTIVE & OBJECTIVE
Interval History: patient has developed ileus, postponing EP procedure    Review of Systems   Constitution: Negative.   HENT: Negative.    Eyes: Negative.    Cardiovascular: Negative.    Respiratory: Negative.    Endocrine: Negative.    Skin: Negative.    Musculoskeletal: Negative.    Gastrointestinal: Positive for bloating.   Genitourinary: Negative.    Neurological: Negative.      Objective:     Vital Signs (Most Recent):  Temp: 98.4 °F (36.9 °C) (08/07/17 0700)  Pulse: 81 (08/07/17 1000)  Resp: 18 (08/07/17 1000)  BP: (!) 70/0 (08/07/17 0800)  SpO2: 99 % (08/07/17 1000) Vital Signs (24h Range):  Temp:  [98.2 °F (36.8 °C)-98.7 °F (37.1 °C)] 98.4 °F (36.9 °C)  Pulse:  [79-86] 81  Resp:  [11-23] 18  SpO2:  [95 %-100 %] 99 %  BP: (68-79)/(0) 70/0  Arterial Line BP: (67-99)/(51-76) 77/57     Weight: 86.7 kg (191 lb 2.2 oz)  Body mass index is 29.06 kg/m².     SpO2: 99 %  O2 Device (Oxygen Therapy): room air    Physical Exam   Constitutional: He is oriented to person, place, and time. He appears well-developed and well-nourished.   HENT:   Head: Normocephalic and atraumatic.   Eyes: Conjunctivae and EOM are normal. Pupils are equal, round, and reactive to light.   Neck: Normal range of motion. Neck supple. No JVD present.   Cardiovascular:   Smooth vad sounds     Pulmonary/Chest: Effort normal and breath sounds normal. No respiratory distress. He has no wheezes. He has no rales. He exhibits no tenderness.   Abdominal: Soft. Bowel sounds are normal. He exhibits distension. There is no tenderness.   Musculoskeletal: Normal range of motion. He exhibits no edema or tenderness.   Neurological: He is alert and oriented to person, place, and time.   Skin: Skin is warm and dry. No erythema. No pallor.       Significant Labs: EP:   Recent Labs  Lab 08/05/17  2212 08/06/17  0326  08/06/17  1653 08/06/17  2150 08/07/17  0418    139  < > 138 138 137   K 3.8 4.5  < > 4.2 4.0 3.9    107  < > 104 105 105   CO2 23 20*  < >  21* 22* 20*   * 120*  < > 101 115* 107   BUN 43* 43*  < > 48* 48* 47*   CREATININE 1.8* 1.8*  < > 2.0* 1.8* 1.9*   CALCIUM 8.4* 8.4*  < > 8.6* 8.6* 8.3*   PROT  --   --   --   --   --  6.2   ALBUMIN  --   --   --   --   --  2.8*   BILITOT  --   --   --   --   --  1.5*   ALKPHOS  --   --   --   --   --  63   AST  --   --   --   --   --  21   ALT  --   --   --   --   --  14   ANIONGAP 10 12  < > 13 11 12   ESTGFRAFRICA 44.0* 44.0*  < > 38.8* 44.0* 41.3*   EGFRNONAA 38.1* 38.1*  < > 33.5* 38.1* 35.7*   WBC 10.81 11.70  --   --   --  13.87*   HGB 9.0* 8.9*  --   --   --  8.6*   HCT 26.4* 27.0*  --   --   --  25.3*    312  --   --   --  339   INR  --  2.7*  --   --   --  2.5*   < > = values in this interval not displayed.    Significant Imaging: Echocardiogram:   2D echo with color flow doppler:   Results for orders placed or performed during the hospital encounter of 07/18/17   2D echo with color flow doppler   Result Value Ref Range    Est. PA Systolic Pressure 33.87     Tricuspid Valve Regurgitation MILD

## 2017-08-07 NOTE — ASSESSMENT & PLAN NOTE
-bridging to coumadin, INR 2.5.  -Per staff, will likely switch the RV and LV ports as he does not need to BiV pace but simply sense for VT/VF protection   -ICD is off but patient has pads to chest wall in case it is needed  -has frequent afib with PVCs at his baseline and this continues  -Has LVAD (HMIII) in place at this time  -procedure will likely be done once ileus resolves so dependant upon patient's gi function

## 2017-08-07 NOTE — PT/OT/SLP PROGRESS
"Physical Therapy  Treatment    Suman Hayden   MRN: [id number]   Admitting Diagnosis: Acute on chronic combined systolic and diastolic heart failure    PT Received On: 08/07/17                     Billable Minutes:  Gait [id number]    Treatment Type: Treatment  PT/PTA: PT     PTA Visit Number: 0       General Precautions: Standard, LVAD, fall, sternal  Orthopedic Precautions: N/A   Braces:       Do you have any cultural, spiritual, Zoroastrianism conflicts, given your current situation?: none noted      Subjective:  Communicated with nurse prior to session.   "I'm going to do what I need to do."  Pain/Comfort  Pain Rating 1: 0/10  Pain Rating Post-Intervention 1: 0/10     Objective:   Patient found with: arterial line, telemetry, pulse ox (continuous), NG tube, chest tube (LVAD)     Functional Mobility:  Bed Mobility:   Rolling/Turning to Left:  (not tested. pt found seated in bedside chair)     Transfers:  Sit <> Stand Assistance: min-mod assist x 4 trials     Gait:   Gait Distance: 70 ft, 130 ft, 60 ft with seated rest periods between distance ambulated.  Portable monitor and chair in tow.  Assistance 1: Total assistance (CGA for gait training and additional assist of 2 for equipment)  Gait Assistive Device:  (Liven good RW)        Therapeutic Activities and Exercises:  Educated on transferring LVAD from PBU>battery.  Transferred with SBA.  Required cueing for proper identification of LVAD parts.     AM-PAC 6 CLICK MOBILITY  How much help from another person does this patient currently need?   1 = Unable, Total/Dependent Assistance  2 = A lot, Maximum/Moderate Assistance  3 = A little, Minimum/Contact Guard/Supervision  4 = None, Modified [place]/Independent     Turning over in bed (including adjusting bedclothes, sheets and blankets)?: 2  Sitting down on and standing up from a chair with arms (e.g., wheelchair, bedside commode, etc.): 2  Moving from lying on back to sitting on the side of the bed?: 2  Moving to and " from a bed to a chair (including a wheelchair)?: 2  Need to walk in hospital room?: 2  Climbing 3-5 steps with a railing?: 1  Total Score: 11     AM-PAC Raw Score CMS G-Code Modifier Level of Impairment Assistance   6 % Total / Unable   7 - 9 CM 80 - 100% Maximal Assist   10 - 14 CL 60 - 80% Moderate Assist   15 - 19 CK 40 - 60% Moderate Assist   20 - 22 CJ 20 - 40% Minimal Assist   23 CI 1-20% SBA / CGA   24 CH 0% Independent/ Mod I      Patient left up in chair with all lines intact, call button in reach and wife present.     Assessment:  Suman Hayden is a 67 y.o. 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 Pt will continue to benefit from PT.  Increased ambulation distance.     Rehab identified problem list/impairments: Rehab identified problem list/impairments: weakness, impaired endurance, impaired functional mobilty, gait instability, decreased lower extremity function     Rehab potential is good.     Activity tolerance: Good     Discharge recommendations: Discharge Facility/Level Of Care Needs: home health PT      Barriers to discharge: Barriers to Discharge: None     Equipment recommendations: Equipment Needed After Discharge:  (will determine DME needs closer to discharge. )      GOALS:          Physical Therapy Goals                 Problem: Physical Therapy Goal      Goal Priority Disciplines Outcome Goal Variances Interventions   Physical Therapy Goal       PT/OT, PT Ongoing (interventions implemented as appropriate)       Description:  Goals to be met by: 17      Patient will increase functional independence with mobility by performin. Supine to sit with MInimal Assistance- not met  2. Sit to supine with MInimal Assistance - not met  3. Sit to stand transfer with Minimal Assistance- not met  4. Bed to chair transfer with Minimal Assistance-  not met  5. Gait  x 50 feet with Minimal Assistance. - not met  6. Lower extremity exercise program x15 reps, with supervision, in order to increase LE strength and (I) with functional mobility. - not met                              PLAN:    Patient to be seen 6 x/week  to address the above listed problems via gait training, therapeutic activities, therapeutic exercises  Plan of Care expires: 08/27/17  Plan of Care reviewed with: patient, spouse             Leslee Whitehead, PT  08/07/2017

## 2017-08-07 NOTE — PROGRESS NOTES
[facility]  Cardiac Electrophysiology  Progress Note    Admission Date: 7/18/2017  Code Status: Prior   Attending Physician: Sunny Downing MD   Expected Discharge Date: 8/16/2017  Principal Problem:Acute on chronic combined systolic and diastolic heart failure    Subjective:     Interval History: patient has developed ileus, postponing EP procedure    Review of Systems   Constitution: Negative.   HENT: Negative.    Eyes: Negative.    Cardiovascular: Negative.    Respiratory: Negative.    Endocrine: Negative.    Skin: Negative.    Musculoskeletal: Negative.    Gastrointestinal: Positive for bloating.   Genitourinary: Negative.    Neurological: Negative.      Objective:     Vital Signs (Most Recent):  Temp: 98.4 °F (36.9 °C) (08/07/17 0700)  Pulse: 81 (08/07/17 1000)  Resp: 18 (08/07/17 1000)  BP: (!) 70/0 (08/07/17 0800)  SpO2: 99 % (08/07/17 1000) Vital Signs (24h Range):  Temp:  [98.2 °F (36.8 °C)-98.7 °F (37.1 °C)] 98.4 °F (36.9 °C)  Pulse:  [79-86] 81  Resp:  [11-23] 18  SpO2:  [95 %-100 %] 99 %  BP: (68-79)/(0) 70/0  Arterial Line BP: (67-99)/(51-76) 77/57     Weight: 86.7 kg (191 lb 2.2 oz)  Body mass index is 29.06 kg/m².     SpO2: 99 %  O2 Device (Oxygen Therapy): room air    Physical Exam   Constitutional: He is oriented to person, place, and time. He appears well-developed and well-nourished.   HENT:   Head: Normocephalic and atraumatic.   Eyes: Conjunctivae and EOM are normal. Pupils are equal, round, and reactive to light.   Neck: Normal range of motion. Neck supple. No JVD present.   Cardiovascular:   Smooth vad sounds     Pulmonary/Chest: Effort normal and breath sounds normal. No respiratory distress. He has no wheezes. He has no rales. He exhibits no tenderness.   Abdominal: Soft. Bowel sounds are normal. He exhibits distension. There is no tenderness.   Musculoskeletal: Normal range of motion. He exhibits no edema or tenderness.   Neurological: He is alert and oriented to person,  place, and time.   Skin: Skin is warm and dry. No erythema. No pallor.       Significant Labs: EP:   Recent Labs  Lab 08/05/17  2212 08/06/17  0326  08/06/17  1653 08/06/17  2150 08/07/17  0418    139  < > 138 138 137   K 3.8 4.5  < > 4.2 4.0 3.9    107  < > 104 105 105   CO2 23 20*  < > 21* 22* 20*   * 120*  < > 101 115* 107   BUN 43* 43*  < > 48* 48* 47*   CREATININE 1.8* 1.8*  < > 2.0* 1.8* 1.9*   CALCIUM 8.4* 8.4*  < > 8.6* 8.6* 8.3*   PROT  --   --   --   --   --  6.2   ALBUMIN  --   --   --   --   --  2.8*   BILITOT  --   --   --   --   --  1.5*   ALKPHOS  --   --   --   --   --  63   AST  --   --   --   --   --  21   ALT  --   --   --   --   --  14   ANIONGAP 10 12  < > 13 11 12   ESTGFRAFRICA 44.0* 44.0*  < > 38.8* 44.0* 41.3*   EGFRNONAA 38.1* 38.1*  < > 33.5* 38.1* 35.7*   WBC 10.81 11.70  --   --   --  13.87*   HGB 9.0* 8.9*  --   --   --  8.6*   HCT 26.4* 27.0*  --   --   --  25.3*    312  --   --   --  339   INR  --  2.7*  --   --   --  2.5*   < > = values in this interval not displayed.    Significant Imaging: Echocardiogram:   2D echo with color flow doppler:   Results for orders placed or performed during the hospital encounter of 07/18/17   2D echo with color flow doppler   Result Value Ref Range    Est. PA Systolic Pressure 33.87     Tricuspid Valve Regurgitation MILD      Assessment and Plan:   Mr. Hyaden is a 67 year old man with advanced NICM (LVEF 10%) on home , HTN, HLD with [employer] CRT-D who presented 7/18 morning with a syncopal episode during a 6MWT followed by an ICD shock x 2. On interrogation, he had been shocked 5 times since 7/14, and 4 times in last two days prior to admit. He is now s/p HMIII LVAD and RV lead on ICD is not pacing.  ICD is currently off.    AICD (automatic cardioverter/defibrillator) present    -bridging to coumadin, INR 2.5.  -Per staff, will likely switch the RV and LV ports as he does not need to BiV pace but simply sense for VT/VF  protection   -ICD is off but patient has pads to chest wall in case it is needed  -has frequent afib with PVCs at his baseline and this continues  -Has LVAD (HMIII) in place at this time  -procedure will likely be done once ileus resolves so dependant upon patient's gi function        AICD discharge    Mr. Hayden is a 67 year old man with advanced NICM (LVEF 10%) awaiting LVAD, HTN HLD who presented with syncope and ICD discharge for VT. Underlying his VT, he also has an organized atrial tachycardia which appears to be new from his EGM since May 2017. He is acutely ill and would benefit from aggressive AAD therapy and treatment of his decompensated heart failure. Furthermore, with newly discovered atrial tachyarrhythmia, he would benefit from full anticoagulation in conjunction with AAD therapy. He has started amiodarone IV load this afternoon.    --continue full IV amiodarone load; plan on transition to PO amiodarone 400mg BID tomorrow x 2 weeks then likely 400mg daily thereafter  --initiate heparin gtt to prevent RUTH thrombus and CVA given atrial tachyarrhythmia and high CVA risk  --will discuss more definitive arrhythmia strategies once he is stabilized.        V-tach    Currently on amiodarone 400mg po BID   Continue Mg and K repletion maintaining K>4, Mg>2            Casey Newman MD  Cardiac Electrophysiology  [facility]

## 2017-08-07 NOTE — PROGRESS NOTES
NGT reconnected to suction. No output. NGT removed with OK from MD Espinoza. Will continue to monitor closely.

## 2017-08-07 NOTE — PLAN OF CARE
Problem: Physical Therapy Goal  Goal: Physical Therapy Goal  Goals to be met by: 17     Patient will increase functional independence with mobility by performin. Supine to sit with MInimal Assistance- not met  2. Sit to supine with MInimal Assistance - not met  3. Sit to stand transfer with Minimal Assistance- not met  4. Bed to chair transfer with Minimal Assistance- not met  5. Gait  x 50 feet with Minimal Assistance. - not met  6. Lower extremity exercise program x15 reps, with supervision, in order to increase LE strength and (I) with functional mobility. - not met      Outcome: Ongoing (interventions implemented as appropriate)  Goals remain appropriate.  Leslee Whitehead, PT  2017

## 2017-08-07 NOTE — PLAN OF CARE
Problem: Patient Care Overview  Goal: Plan of Care Review  Outcome: Ongoing (interventions implemented as appropriate)  PT VSS and afebrile throughout shift. Pt maintained on Dobutamine, Amiodarone, Lasix, and Heparin gtts. Pt had one medium size soft bowel movement during the night.  Pt and spouse have been updated on plan of care at bedside. See flowsheet for details.

## 2017-08-07 NOTE — PT/OT/SLP PROGRESS
"Occupational Therapy  Treatment    Suman Hayden   MRN: [id number]   Admitting Diagnosis: Acute on chronic combined systolic and diastolic heart failure    OT Date of Treatment: 08/07/17   OT Start Time: 0900  OT Stop Time: 0930  OT Total Time (min): 30 min    Billable Minutes:  Therapeutic Activity 30    General Precautions: Standard, LVAD, fall, sternal  Orthopedic Precautions: N/A    Subjective:  Communicated with nsg prior to session.  "I just can't remember, but I want to be perfect." pt reports.     Pain/Comfort  Pain Rating 1:  (Pt unable to rate pain. )  Location 1: throat  Pain Addressed 1: Reposition, Distraction    Objective:   Pt found seated in b/s chair with spouse present. Pt with LVAD to wall power.      Functional Mobility:    Transfers:   Sit <> Stand Assistance: Moderate Assistance  Sit <> Stand Assistive Device: No Assistive Device      Activities of Daily Living:     UE Dressing Level of Assistance: Moderate assistance  LE Dressing Level of Assistance: Total assistance  Grooming Position: Seated  Grooming Level of Assistance: Stand by assistance    LVAD education continued this date. Pt able to perform self test correctly, but spouse recorded numbers. Spouse completed battery rotation and provided pt with equipment needed to transition self to battery power. Pt needing minimal cues to complete battery transition and MIN A to place equipment in shoulder consolidation bag in prep for mobility in hallway with PT.  Pt unable to identify any LVAD parts this date even after education with repetition and increased time. Spouse present and able to recall items appropriately.     OT also provided yellow t-putty for hand strengthening and coordination. OT provided education to pt and spouse and pt completed hand exs appropriately with specific exs to promote manipulation of LVAD. Pt demo good understanding and spouse verb understanding.     AM-PAC 6 CLICK ADL   How much help from another person does this " "patient currently need?   1 = Unable, Total/Dependent Assistance  2 = A lot, Maximum/Moderate Assistance  3 = A little, Minimum/Contact Guard/Supervision  4 = None, Modified [place]/Independent    Putting on and taking off regular lower body clothing? : 1  Bathing (including washing, rinsing, drying)?: 1  Toileting, which includes using toilet, bedpan, or urinal? : 1  Putting on and taking off regular upper body clothing?: 2  Taking care of personal grooming such as brushing teeth?: 3  Eating meals?: 1  Total Score: 9     AM-PAC Raw Score CMS "G-Code Modifier Level of Impairment Assistance   6 % Total / Unable   7 - 8 CM 80 - 100% Maximal Assist   9-13 CL 60 - 80% Moderate Assist   14 - 19 CK 40 - 60% Moderate Assist   20 - 22 CJ 20 - 40% Minimal Assist   23 CI 1-20% SBA / CGA   24 CH 0% Independent/ Mod I       Patient left up in chair with all lines intact and PT, spouse and nsg present    ASSESSMENT:  Suman Hayden is a 67 y.o. male with a medical diagnosis of Acute on chronic combined systolic and diastolic heart failure and now s/p LVAD present. Pt tolerated session well with good effort and vital signs stable. Pt does have poor understanding of LVAD parts identification, but is able to transition to battery fairly well. Pt to benefit from cont OT to address stated goals.     Rehab identified problem list/impairments: Rehab identified problem list/impairments: impaired functional mobilty, gait instability, impaired endurance, weakness, impaired balance, impaired self care skills    Rehab potential is good     Activity tolerance: Good    Discharge recommendations: Discharge Facility/Level Of Care Needs: home with home health       GOALS:    Occupational Therapy Goals        Problem: Occupational Therapy Goal    Goal Priority Disciplines Outcome Interventions   Occupational Therapy Goal     OT, PT/OT Ongoing (interventions implemented as appropriate)    Description:  Goals to be met by:  2 weeks 8/12/17 "     Patient will increase functional independence with ADLs by performing:  Feeding: Independent   UE Dressing with Supervision.  LE Dressing with Supervision.  Grooming while standing with Supervision.  Toileting from toilet with Supervision for hygiene and clothing management.   Stand pivot transfers with Supervision.  Toilet transfer to toilet with Supervision.  Pt will be independent with LVAD yasmin't.                 Multidisciplinary Problems (Resolved)        Problem: Occupational Therapy Goal    Goal Priority Disciplines Outcome Interventions   Occupational Therapy Goal   (Resolved)     OT, PT/OT  Error    Description:  Goals to be met by: 8/04/2017    Patient will increase functional independence with ADLs by performing:    Increased functional strength to 5/5 for improved ADL performance.  Upper extremity exercise program and R LE ankle pumps  3x 10 reps per handout, with independence.                      Plan:  Patient to be seen 6 x/week to address the above listed problems via self-care/home management, therapeutic activities, therapeutic exercises  Plan of Care expires: 08/29/17  Plan of Care reviewed with: patient, spouse         DELMY Navarro  08/07/2017

## 2017-08-07 NOTE — PROGRESS NOTES
[facility]  Heart Transplant  Progress Note    Patient Name: Suman Hayden  MRN: [id number]  Admission Date: 7/18/2017  Hospital Length of Stay: 20 days  Attending Physician: Sunny Downing MD  Primary Care Provider: Joe Ernst MD  Principal Problem:Acute on chronic combined systolic and diastolic heart failure    Subjective:     Interval History: did have a BM last night     Continuous Infusions:   amiodarone 0.5 mg/min (08/06/17 0800)    dextrose 5 % and 0.9 % NaCl 80 mL/hr at 08/06/17 0800    DOBUTamine 5 mcg/kg/min (08/06/17 0800)    furosemide (LASIX) 1 mg/mL infusion (non-titrating) 10 mg/hr (08/06/17 0600)    heparin (porcine) in 5 % dex 400 Units/hr (08/06/17 0800)    nicardipine Stopped (08/05/17 0600)     Scheduled Meds:   amlodipine  5 mg Oral Daily    aspirin  300 mg Rectal Daily    pantoprazole  40 mg Intravenous Daily    pravastatin  20 mg Oral QHS    sodium chloride 0.9%  10 mL Intravenous Q6H    sodium chloride 0.9%  3 mL Intravenous Q8H     PRN Meds:albumin human 5%, albuterol sulfate, bisacodyl, fentaNYL, hydrALAZINE, magnesium hydroxide 400 mg/5 ml, magnesium sulfate IVPB, ondansetron, oxycodone, oxycodone, potassium chloride, potassium chloride, Flushing PICC Protocol **AND** sodium chloride 0.9% **AND** sodium chloride 0.9%, sodium phosphate IVPB, sodium phosphate IVPB, sodium phosphate IVPB    Review of patient's allergies indicates:  No Known Allergies  Objective:     Vital Signs (Most Recent):  Temp: 98 °F (36.7 °C) (08/06/17 0700)  Pulse: 80 (08/06/17 0800)  Resp: 19 (08/06/17 0800)  BP: (!) 76/0 (08/06/17 0700)  SpO2: 100 % (08/06/17 0545) Vital Signs (24h Range):  Temp:  [98 °F (36.7 °C)-98.6 °F (37 °C)] 98 °F (36.7 °C)  Pulse:  [] 80  Resp:  [11-28] 19  SpO2:  [95 %-100 %] 100 %  BP: (76-86)/(0) 76/0  Arterial Line BP: ()/(55-81) 82/60     Weight: 85.7 kg (188 lb 15 oz)  Body mass index is 28.73 kg/m².      Intake/Output Summary (Last 24 hours)  at 08/06/17 0820  Last data filed at 08/06/17 0555   Gross per 24 hour   Intake          2528.86 ml   Output             1220 ml   Net          1308.86 ml       Hemodynamic Parameters:       Telemetry:     Physical Exam   Constitutional: He is oriented to person, place, and time. He appears well-developed and well-nourished. No distress.   HENT:   Head: Normocephalic and atraumatic.   Eyes: EOM are normal. Pupils are equal, round, and reactive to light.   Neck: Normal range of motion. Neck supple. No JVD present.   Cardiovascular: Normal rate and regular rhythm.  Exam reveals no gallop and no friction rub.    No murmur heard.  + LVAD hum    Pulmonary/Chest: Effort normal and breath sounds normal. No respiratory distress. He has no wheezes. He has no rales.   Abdominal: Soft. He exhibits distension (mild). There is no tenderness. There is no guarding.   Musculoskeletal: Normal range of motion. He exhibits no edema.   Neurological: He is alert and oriented to person, place, and time. No cranial nerve deficit. Coordination normal.   Skin: Skin is warm and dry. He is not diaphoretic. No erythema.   Nursing note and vitals reviewed.      Significant Labs:  CBC:    Recent Labs  Lab 08/06/17  0326   WBC 11.70   RBC 3.26*   HGB 8.9*   HCT 27.0*      MCV 83   MCH 27.3   MCHC 33.0     BNP:    Recent Labs  Lab 08/04/17  0303   BNP 1,381*     CMP:    Recent Labs  Lab 08/04/17  1438  08/06/17  0326   GLU 91  < > 120*   CALCIUM 8.9  < > 8.4*   ALBUMIN 2.9*  --   --    PROT 6.6  --   --      < > 139   K 4.1  < > 4.5   CO2 23  < > 20*     < > 107   BUN 45*  < > 43*   CREATININE 2.1*  < > 1.8*   ALKPHOS 61  --   --    ALT 15  --   --    AST 29  --   --    BILITOT 2.1*  --   --    < > = values in this interval not displayed.   Coagulation:     Recent Labs  Lab 08/06/17  0326   INR 2.7*   APTT 38.5*     LDH:    Recent Labs  Lab 08/04/17  0303 08/05/17  0400 08/06/17  0326   * 278* 307*      Microbiology:  Microbiology Results (last 7 days)     Procedure Component Value Units Date/Time    Urine culture [[id number]] Collected:  08/04/17 1655    Order Status:  Completed Specimen:  Urine from Urine, Catheterized Updated:  08/05/17 2116     Urine Culture, Routine No growth    Blood culture [[id number]] Collected:  08/04/17 1653    Order Status:  Completed Specimen:  Blood from Peripheral, Wrist, Right Updated:  08/05/17 2012     Blood Culture, Routine No Growth to date     Blood Culture, Routine No Growth to date    Blood culture [[id number]] Collected:  08/04/17 1654    Order Status:  Completed Specimen:  Blood from Peripheral, Wrist, Left Updated:  08/05/17 2012     Blood Culture, Routine No Growth to date     Blood Culture, Routine No Growth to date          I have reviewed all pertinent labs within the past 24 hours.    Estimated Creatinine Clearance: 42.4 mL/min (based on Cr of 1.8).    Diagnostic Results:  I have reviewed and interpreted all pertinent imaging results/findings within the past 24 hours.    Assessment and Plan:     Atrial fibrillation    - c/w ac  - c/w amio gtt        LVAD (left ventricular assist device) present    - LVAD HM III  - Speed 5200  - no events  -NO off         Urine culture positive    - afebrile, treated with IV cefipime         Atrial tachycardia    - [id number] - 3  - c/w amiodarone  - c/w heparin gtt        AICD discharge    - appropriate in the setting of VT aggravated by underlying AT/AFL (earlier during the admission)  - 7/28 - setting of AF undersense - device reprogrammed to VVI 80  - tachy therapy turned off  - on amio gtt  - EP planning to do lead revision once INR theraputic         Hepatitis B core antibody positive since 2012    - will defer liver biopsy as CTS not requiring it  - ID consult cleared the patient for sx  - case discussed with hepatology, low suspicion for liver involvement        V-tach    - s/p amiodarone reload - now on amio gtt         Hyperlipidemia    - c/w pravastatin        * Acute on chronic combined systolic and diastolic heart failure    - CTS Primary  - s/p LVAD HM III (7/27/17), speed @ 5200 rpm  - chest closure (7/28/17)  - extubated (7/29/17)  - on , now off epi , lasix 10.    -Anticoagulation per CTS                  MELISSA Long  Heart Transplant  [facility]

## 2017-08-08 LAB
ANION GAP SERPL CALC-SCNC: 12 MMOL/L
ANION GAP SERPL CALC-SCNC: 12 MMOL/L
ANION GAP SERPL CALC-SCNC: 13 MMOL/L
ANION GAP SERPL CALC-SCNC: 9 MMOL/L
APTT BLDCRRT: 35.4 SEC
APTT BLDCRRT: 38.2 SEC
APTT BLDCRRT: 43.1 SEC
APTT BLDCRRT: 44.3 SEC
APTT BLDCRRT: 45.9 SEC
BASOPHILS # BLD AUTO: 0.01 K/UL
BASOPHILS NFR BLD: 0.1 %
BUN SERPL-MCNC: 46 MG/DL
BUN SERPL-MCNC: 46 MG/DL
BUN SERPL-MCNC: 47 MG/DL
BUN SERPL-MCNC: 50 MG/DL
CALCIUM SERPL-MCNC: 8.2 MG/DL
CALCIUM SERPL-MCNC: 8.5 MG/DL
CALCIUM SERPL-MCNC: 8.5 MG/DL
CALCIUM SERPL-MCNC: 8.7 MG/DL
CHLORIDE SERPL-SCNC: 103 MMOL/L
CHLORIDE SERPL-SCNC: 103 MMOL/L
CHLORIDE SERPL-SCNC: 104 MMOL/L
CHLORIDE SERPL-SCNC: 105 MMOL/L
CO2 SERPL-SCNC: 20 MMOL/L
CO2 SERPL-SCNC: 20 MMOL/L
CO2 SERPL-SCNC: 22 MMOL/L
CO2 SERPL-SCNC: 24 MMOL/L
CREAT SERPL-MCNC: 1.7 MG/DL
CREAT SERPL-MCNC: 1.8 MG/DL
CREAT SERPL-MCNC: 1.9 MG/DL
CREAT SERPL-MCNC: 2.1 MG/DL
DIFFERENTIAL METHOD: ABNORMAL
EOSINOPHIL # BLD AUTO: 0.2 K/UL
EOSINOPHIL NFR BLD: 1.2 %
ERYTHROCYTE [DISTWIDTH] IN BLOOD BY AUTOMATED COUNT: 16.1 %
EST. GFR  (AFRICAN AMERICAN): 36.6 ML/MIN/1.73 M^2
EST. GFR  (AFRICAN AMERICAN): 41.3 ML/MIN/1.73 M^2
EST. GFR  (AFRICAN AMERICAN): 44 ML/MIN/1.73 M^2
EST. GFR  (AFRICAN AMERICAN): 47.2 ML/MIN/1.73 M^2
EST. GFR  (NON AFRICAN AMERICAN): 31.6 ML/MIN/1.73 M^2
EST. GFR  (NON AFRICAN AMERICAN): 35.7 ML/MIN/1.73 M^2
EST. GFR  (NON AFRICAN AMERICAN): 38.1 ML/MIN/1.73 M^2
EST. GFR  (NON AFRICAN AMERICAN): 40.8 ML/MIN/1.73 M^2
GLUCOSE SERPL-MCNC: 102 MG/DL
GLUCOSE SERPL-MCNC: 110 MG/DL
GLUCOSE SERPL-MCNC: 130 MG/DL
GLUCOSE SERPL-MCNC: 93 MG/DL
HCT VFR BLD AUTO: 25.3 %
HGB BLD-MCNC: 8.5 G/DL
INR PPP: 2.5
LDH SERPL L TO P-CCNC: 210 U/L
LYMPHOCYTES # BLD AUTO: 1.6 K/UL
LYMPHOCYTES NFR BLD: 12.5 %
MAGNESIUM SERPL-MCNC: 2.2 MG/DL
MAGNESIUM SERPL-MCNC: 2.2 MG/DL
MAGNESIUM SERPL-MCNC: 2.3 MG/DL
MAGNESIUM SERPL-MCNC: 2.3 MG/DL
MCH RBC QN AUTO: 27.5 PG
MCHC RBC AUTO-ENTMCNC: 33.6 G/DL
MCV RBC AUTO: 82 FL
MONOCYTES # BLD AUTO: 0.9 K/UL
MONOCYTES NFR BLD: 7.1 %
NEUTROPHILS # BLD AUTO: 10.3 K/UL
NEUTROPHILS NFR BLD: 78.9 %
PHOSPHATE SERPL-MCNC: 4.1 MG/DL
PLATELET # BLD AUTO: 334 K/UL
PMV BLD AUTO: 10.8 FL
POTASSIUM SERPL-SCNC: 3.6 MMOL/L
POTASSIUM SERPL-SCNC: 3.9 MMOL/L
POTASSIUM SERPL-SCNC: 4 MMOL/L
POTASSIUM SERPL-SCNC: 4.3 MMOL/L
PROTHROMBIN TIME: 25.3 SEC
RBC # BLD AUTO: 3.09 M/UL
SODIUM SERPL-SCNC: 136 MMOL/L
SODIUM SERPL-SCNC: 136 MMOL/L
SODIUM SERPL-SCNC: 137 MMOL/L
SODIUM SERPL-SCNC: 138 MMOL/L
WBC # BLD AUTO: 13.01 K/UL

## 2017-08-08 PROCEDURE — A4216 STERILE WATER/SALINE, 10 ML: HCPCS | Performed by: STUDENT IN AN ORGANIZED HEALTH CARE EDUCATION/TRAINING PROGRAM

## 2017-08-08 PROCEDURE — 99231 SBSQ HOSP IP/OBS SF/LOW 25: CPT | Mod: GC,,, | Performed by: INTERNAL MEDICINE

## 2017-08-08 PROCEDURE — 25000003 PHARM REV CODE 250: Performed by: NURSE PRACTITIONER

## 2017-08-08 PROCEDURE — 85730 THROMBOPLASTIN TIME PARTIAL: CPT | Mod: 91

## 2017-08-08 PROCEDURE — 99233 SBSQ HOSP IP/OBS HIGH 50: CPT | Mod: 24,,, | Performed by: THORACIC SURGERY (CARDIOTHORACIC VASCULAR SURGERY)

## 2017-08-08 PROCEDURE — 97803 MED NUTRITION INDIV SUBSEQ: CPT

## 2017-08-08 PROCEDURE — 25000003 PHARM REV CODE 250: Performed by: STUDENT IN AN ORGANIZED HEALTH CARE EDUCATION/TRAINING PROGRAM

## 2017-08-08 PROCEDURE — 63600175 PHARM REV CODE 636 W HCPCS: Performed by: STUDENT IN AN ORGANIZED HEALTH CARE EDUCATION/TRAINING PROGRAM

## 2017-08-08 PROCEDURE — 27000248 HC VAD-ADDITIONAL DAY

## 2017-08-08 PROCEDURE — 93750 INTERROGATION VAD IN PERSON: CPT | Mod: ,,, | Performed by: INTERNAL MEDICINE

## 2017-08-08 PROCEDURE — 99233 SBSQ HOSP IP/OBS HIGH 50: CPT | Mod: GC,,, | Performed by: SURGERY

## 2017-08-08 PROCEDURE — A4216 STERILE WATER/SALINE, 10 ML: HCPCS | Performed by: THORACIC SURGERY (CARDIOTHORACIC VASCULAR SURGERY)

## 2017-08-08 PROCEDURE — 85730 THROMBOPLASTIN TIME PARTIAL: CPT

## 2017-08-08 PROCEDURE — 84100 ASSAY OF PHOSPHORUS: CPT

## 2017-08-08 PROCEDURE — 97116 GAIT TRAINING THERAPY: CPT

## 2017-08-08 PROCEDURE — 99233 SBSQ HOSP IP/OBS HIGH 50: CPT | Mod: ,,, | Performed by: INTERNAL MEDICINE

## 2017-08-08 PROCEDURE — 83735 ASSAY OF MAGNESIUM: CPT | Mod: 91

## 2017-08-08 PROCEDURE — 85025 COMPLETE CBC W/AUTO DIFF WBC: CPT

## 2017-08-08 PROCEDURE — 93750 INTERROGATION VAD IN PERSON: CPT | Mod: 77,,, | Performed by: THORACIC SURGERY (CARDIOTHORACIC VASCULAR SURGERY)

## 2017-08-08 PROCEDURE — 80048 BASIC METABOLIC PNL TOTAL CA: CPT | Mod: 91

## 2017-08-08 PROCEDURE — 85610 PROTHROMBIN TIME: CPT

## 2017-08-08 PROCEDURE — 83615 LACTATE (LD) (LDH) ENZYME: CPT

## 2017-08-08 PROCEDURE — 20600001 HC STEP DOWN PRIVATE ROOM

## 2017-08-08 PROCEDURE — C9113 INJ PANTOPRAZOLE SODIUM, VIA: HCPCS | Performed by: STUDENT IN AN ORGANIZED HEALTH CARE EDUCATION/TRAINING PROGRAM

## 2017-08-08 PROCEDURE — 25000003 PHARM REV CODE 250: Performed by: THORACIC SURGERY (CARDIOTHORACIC VASCULAR SURGERY)

## 2017-08-08 PROCEDURE — 97530 THERAPEUTIC ACTIVITIES: CPT

## 2017-08-08 PROCEDURE — 63600175 PHARM REV CODE 636 W HCPCS: Performed by: THORACIC SURGERY (CARDIOTHORACIC VASCULAR SURGERY)

## 2017-08-08 PROCEDURE — 93750 INTERROGATION VAD IN PERSON: CPT | Performed by: THORACIC SURGERY (CARDIOTHORACIC VASCULAR SURGERY)

## 2017-08-08 RX ORDER — WARFARIN 2 MG/1
2 TABLET ORAL ONCE
Status: COMPLETED | OUTPATIENT
Start: 2017-08-08 | End: 2017-08-08

## 2017-08-08 RX ORDER — DOCUSATE SODIUM 100 MG/1
100 CAPSULE, LIQUID FILLED ORAL 2 TIMES DAILY
Status: DISCONTINUED | OUTPATIENT
Start: 2017-08-08 | End: 2017-08-09

## 2017-08-08 RX ORDER — AMIODARONE HYDROCHLORIDE 200 MG/1
400 TABLET ORAL 2 TIMES DAILY
Status: DISCONTINUED | OUTPATIENT
Start: 2017-08-08 | End: 2017-08-09

## 2017-08-08 RX ORDER — POLYETHYLENE GLYCOL 3350 17 G/17G
17 POWDER, FOR SOLUTION ORAL DAILY
Status: DISCONTINUED | OUTPATIENT
Start: 2017-08-08 | End: 2017-08-09

## 2017-08-08 RX ADMIN — PRAVASTATIN SODIUM 20 MG: 20 TABLET ORAL at 09:08

## 2017-08-08 RX ADMIN — Medication 3 ML: at 06:08

## 2017-08-08 RX ADMIN — POLYETHYLENE GLYCOL 3350 17 G: 17 POWDER, FOR SOLUTION ORAL at 03:08

## 2017-08-08 RX ADMIN — AMLODIPINE BESYLATE 5 MG: 5 TABLET ORAL at 09:08

## 2017-08-08 RX ADMIN — HYDRALAZINE HYDROCHLORIDE 10 MG: 20 INJECTION INTRAMUSCULAR; INTRAVENOUS at 03:08

## 2017-08-08 RX ADMIN — POTASSIUM CHLORIDE 20 MEQ: 200 INJECTION, SOLUTION INTRAVENOUS at 07:08

## 2017-08-08 RX ADMIN — Medication 10 ML: at 06:08

## 2017-08-08 RX ADMIN — PANTOPRAZOLE SODIUM 40 MG: 40 INJECTION, POWDER, FOR SOLUTION INTRAVENOUS at 09:08

## 2017-08-08 RX ADMIN — AMIODARONE HYDROCHLORIDE 0.5 MG/MIN: 1.8 INJECTION, SOLUTION INTRAVENOUS at 09:08

## 2017-08-08 RX ADMIN — AMIODARONE HYDROCHLORIDE 0.5 MG/MIN: 1.8 INJECTION, SOLUTION INTRAVENOUS at 12:08

## 2017-08-08 RX ADMIN — FUROSEMIDE 10 MG/HR: 10 INJECTION, SOLUTION INTRAMUSCULAR; INTRAVENOUS at 09:08

## 2017-08-08 RX ADMIN — Medication 10 ML: at 12:08

## 2017-08-08 RX ADMIN — HYDRALAZINE HYDROCHLORIDE 10 MG: 20 INJECTION INTRAMUSCULAR; INTRAVENOUS at 01:08

## 2017-08-08 RX ADMIN — ASPIRIN 300 MG: 300 SUPPOSITORY RECTAL at 09:08

## 2017-08-08 RX ADMIN — ACETAMINOPHEN 650 MG: 325 TABLET ORAL at 09:08

## 2017-08-08 RX ADMIN — AMIODARONE HYDROCHLORIDE 400 MG: 200 TABLET ORAL at 09:08

## 2017-08-08 RX ADMIN — POTASSIUM CHLORIDE 20 MEQ: 200 INJECTION, SOLUTION INTRAVENOUS at 12:08

## 2017-08-08 RX ADMIN — POTASSIUM CHLORIDE 20 MEQ: 200 INJECTION, SOLUTION INTRAVENOUS at 06:08

## 2017-08-08 RX ADMIN — WARFARIN SODIUM 2 MG: 2 TABLET ORAL at 05:08

## 2017-08-08 RX ADMIN — DOCUSATE SODIUM 100 MG: 100 CAPSULE, LIQUID FILLED ORAL at 09:08

## 2017-08-08 RX ADMIN — ONDANSETRON 4 MG: 2 INJECTION INTRAMUSCULAR; INTRAVENOUS at 09:08

## 2017-08-08 NOTE — PROGRESS NOTES
[facility]  Cardiac Electrophysiology  Progress Note    Admission Date: 7/18/2017  Code Status: Prior   Attending Physician: Sunny Downing MD   Expected Discharge Date: 8/16/2017  Principal Problem:Acute on chronic combined systolic and diastolic heart failure    Subjective:     Interval History: no acute events overnight.  Procedure on hold as patient has ileus    Review of Systems   Constitution: Negative.   HENT: Negative.    Eyes: Negative.    Cardiovascular: Negative.    Respiratory: Negative.    Endocrine: Negative.    Skin: Negative.    Musculoskeletal: Negative.    Gastrointestinal: Positive for bloating.   Genitourinary: Negative.    Neurological: Negative.      Objective:     Vital Signs (Most Recent):  Temp: 98.2 °F (36.8 °C) (08/08/17 0700)  Pulse: (!) 118 (08/08/17 1300)  Resp: 20 (08/08/17 1300)  BP: (!) 68/0 (08/08/17 0700)  SpO2: 97 % (08/08/17 0700) Vital Signs (24h Range):  Temp:  [98.2 °F (36.8 °C)-98.9 °F (37.2 °C)] 98.2 °F (36.8 °C)  Pulse:  [] 118  Resp:  [13-25] 20  SpO2:  [90 %-100 %] 97 %  BP: (68)/(0) 68/0  Arterial Line BP: (70-96)/(52-74) 76/61     Weight: 86.7 kg (191 lb 2.2 oz)  Body mass index is 29.06 kg/m².     SpO2: 97 %  O2 Device (Oxygen Therapy): room air    Physical Exam    Significant Labs: EP:   Recent Labs  Lab 08/07/17  0418  08/07/17  2208 08/08/17  0512 08/08/17  1137     < > 138 138 137   K 3.9  < > 3.6 3.6 4.3     < > 106 104 105   CO2 20*  < > 21* 22* 20*     < > 94 93 102   BUN 47*  < > 47* 47* 46*   CREATININE 1.9*  < > 1.7* 1.7* 1.9*   CALCIUM 8.3*  < > 8.0* 8.2* 8.5*   PROT 6.2  --   --   --   --    ALBUMIN 2.8*  --   --   --   --    BILITOT 1.5*  --   --   --   --    ALKPHOS 63  --   --   --   --    AST 21  --   --   --   --    ALT 14  --   --   --   --    ANIONGAP 12  < > 11 12 12   ESTGFRAFRICA 41.3*  < > 47.2* 47.2* 41.3*   EGFRNONAA 35.7*  < > 40.8* 40.8* 35.7*   WBC 13.87*  --   --  13.01*  --    HGB 8.6*  --   --   8.5*  --    HCT 25.3*  --   --  25.3*  --      --   --  334  --    INR 2.5*  --   --  2.5*  --    < > = values in this interval not displayed.    Significant Imaging: Echocardiogram:   2D echo with color flow doppler:   Results for orders placed or performed during the hospital encounter of 07/18/17   2D echo with color flow doppler   Result Value Ref Range    EF 10 (A) 55 - 65    Mitral Valve Regurgitation MILD     Diastolic Dysfunction Yes (A)     Est. PA Systolic Pressure 25.81     Mitral Valve Mobility NORMAL      Assessment and Plan:   Mr. Hayden is a 67 year old man with advanced NICM (LVEF 10%) on home , HTN, HLD with [employer] CRT-D who presented 7/18 morning with a syncopal episode during a 6MWT followed by an ICD shock x 2. On interrogation, he had been shocked 5 times since 7/14, and 4 times in last two days prior to admit. He is now s/p HMIII LVAD and RV lead on ICD is not pacing.  ICD is currently off.  AICD (automatic cardioverter/defibrillator) present    -INR 2.5.  -Per staff, will likely switch the RV and LV ports as he does not need to BiV pace but simply sense for VT/VF protection   -ICD is off but patient has pads to chest wall in case it is needed  -has frequent afib with PVCs at his baseline and this continues  -Has LVAD (HMIII) in place at this time  -procedure will likely be done once ileus resolves so dependant upon patient's gi function        AICD discharge    Amio 400mg daily        V-tach    Currently on amiodarone 400mg po BID   Continue Mg and K repletion maintaining K>4, Mg>2            Casey Newman MD  Cardiac Electrophysiology  [facility]

## 2017-08-08 NOTE — SIGNIFICANT EVENT
Patient transported to the [facility]TS[facility] 312 by myself and Elena, antony Davalos both PCTs,  via hospital bed on telemetry and LVAD on battery.  Patient is stable and interacting in conversation.  Spouse is at the bedside and will travel along to the new destination.  Report called ahead.

## 2017-08-08 NOTE — PROCEDURES
Patient aao x3 with wife at bedside. VAD interrogation completed this AM in the event changes needed to be made. Will continue to monitor for further issues.     EDUCATION:  Spent about 35 minutes with pt and pt caregiver reviewing completed portion of workbook. Also had long discussion with pt wife about powering the system and batteries.  Pt wife also excited to do dressing for the first time today.      Pulsatile: Yes, intermittent   VAD Sounds: HM3  Smooth  Problems / Issues / Alarms with VAD if any: None noted  HCT: 25.3   Modular Cable Connection Intact(no yellow exposed): YES      VAD Interrogation:  TXP LVAD INTERROGATIONS 8/8/2017 8/8/2017 8/8/2017 8/8/2017 8/8/2017 8/8/2017 8/8/2017   Type HeartMate3 HeartMate3 HeartMate3 HeartMate3 HeartMate3 HeartMate3 HeartMate3   Flow 3.7 3.6 3.7 3.6 3.7 - 4.3   Speed 5200 5250 5200 5250 5200 - 5250   PI 5.7 5.5 5.6 5.6 5.8 - 3.0   Power ([place]) 3.6 3.6 3.5 3.6 3.6 - 3.6   LSL 4800 4800 4800 4800 4800 - 4800   Pulsatility - - - - - - -   Some recent data might be hidden   }

## 2017-08-08 NOTE — PLAN OF CARE
Plan of care reviewed with patient and his wife. No events overnight. BM x1, +flatus. Good UOP per urinal. Informed that he may or may not be going to his procedure today to address his AICD leads, if not, will suggest possible diet advance. Hydralazine IV PRN given as ordered for MAP control. Gtts as ordered: Amiodarone @ 0.5 mcg/min, Dobutamine @ 5 mcg/kg/min, Heparin @ 600 units/hr, Lasix @ 10 mg/hr, and MIVF @ 80 ml/hr. LVAD Heartmate 3 speed 5200, flows 4.3-4.5, power 3.5-3.6. Became slightly tachycardia towards end of shift, denies pain, denies discomfort. CVP per PICC 5-9. Tolerated complete bed bath, no skin breakdown noted to sacrum, buttocks, heels.

## 2017-08-08 NOTE — PROGRESS NOTES
"  [facility]  Adult Nutrition  Consult Note    SUMMARY     Recommendations    1. When medically feasible, advance diet as tolerated to cardiac.   2. Add Boost Plus ONS (chocolate or strawberry) ONS upon diet advancement.   3. RD to monitor & follow-up.    Goals: Meet % EEN, EPN  Nutrition Goal Status: progressing towards goal  Communication of RD Recs: reviewed with RN    Reason for Assessment    Reason for Assessment: RD follow-up  Diagnosis:  (s/p LVAD)  Relevent Medical History: HTN, HLD   Interdisciplinary Rounds: attended     General Information Comments: S/p LVAD. Pt on clear liquid diet, tolerating. Per pt's wife, MD states diet to be advanced this afternoon. Pt with good appetite PTA, drinks Boost at home. Pt complaining of sore throat 2/2 intubation.    Nutrition Discharge Planning: Adequate PO intake    Nutrition Prescription Ordered    Current Diet Order: Clear liquid diet    Evaluation of Received Nutrients/Fluid Intake    IV Fluid (mL): 1920    Nutrition/Diet History    Patient Reported Diet/Restrictions/Preferences: general, low salt     Factors Affecting Nutritional Intake: other (see comments) (Clear liquid diet)    Labs/Tests/Procedures/Meds    Pertinent Labs Reviewed: reviewed, pertinent  Pertinent Labs Comments: BUN 47, Creat 1.7, GFR 47.2  Pertinent Medications Reviewed: reviewed, pertinent  Pertinent Medications Comments: Statin, Warfarin, Amdiodarone, IVF, Heparin, Nicardipine    Physical Findings    Overall Physical Appearance: nourished  Tubes: other (see comments) (chest tube)  Oral/Mouth Cavity: tooth/teeth missing  Skin: edema, incision    Anthropometrics    Temp: 98.2 °F (36.8 °C)     Height: 5' 8" (172.7 cm)  Weight Method: Bed Scale  Weight: 86.7 kg (191 lb 2.2 oz)  Ideal Body Weight (IBW), Male: 154 lb     % Ideal Body Weight, Male (lb): 124.12 lb     BMI (Calculated): 29.1  BMI Grade: 25 - 29.9 - overweight    Estimated/Assessed Needs    Weight Used For " Calorie Calculations: 86.7 kg (191 lb 2.2 oz)      Energy Calorie Requirements (kcal): 2020 kcal/d  Energy Need Method: [place]-St Jeor     Weight Used For Protein Calculations: 86.7 kg (191 lb 2.2 oz)  Protein Requirements:  g/d     Fluid Need Method: RDA Method, other (see comments) (Per MD or 1 mL/kcal)    Assessment and Plan    Inadequate energy intake r/t inability to consume sufficient energy AEB NPO with no alternate means of nutrition.  Status: Improving    Monitor and Evaluation    Food and Nutrient Intake: energy intake, food and beverage intake  Food and Nutrient Adminstration: diet order     Physical Activity and Function: nutrition-related ADLs and IADLs  Anthropometric Measurements: weight, weight change, body mass index  Biochemical Data, Medical Tests and Procedures: gastrointestinal profile, electrolyte and renal panel, glucose/endocrine profile, lipid profile, inflammatory profile  Nutrition-Focused Physical Findings: overall appearance    Nutrition Risk    Level of Risk: other (see comments) (2x/week)    Nutrition Follow-Up    RD Follow-up?: Yes

## 2017-08-08 NOTE — SUBJECTIVE & OBJECTIVE
Interval History: BM last night and flatus now     Continuous Infusions:   amiodarone 0.5 mg/min (08/08/17 0905)    dextrose 5 % and 0.9 % NaCl 80 mL/hr at 08/08/17 0600    DOBUTamine 5 mcg/kg/min (08/08/17 0600)    furosemide (LASIX) 1 mg/mL infusion (non-titrating) 10 mg/hr (08/08/17 0600)    heparin (porcine) in 5 % dex 600 Units/hr (08/08/17 0600)    nicardipine Stopped (08/05/17 0600)     Scheduled Meds:   amiodarone  400 mg Oral BID    amlodipine  5 mg Oral Daily    aspirin  300 mg Rectal Daily    pantoprazole  40 mg Intravenous Daily    pravastatin  20 mg Oral QHS    sodium chloride 0.9%  10 mL Intravenous Q6H    sodium chloride 0.9%  3 mL Intravenous Q8H    warfarin  2 mg Oral Once     PRN Meds:acetaminophen, albumin human 5%, albuterol sulfate, bisacodyl, fentaNYL, hydrALAZINE, magnesium hydroxide 400 mg/5 ml, magnesium sulfate IVPB, ondansetron, potassium chloride, potassium chloride, Flushing PICC Protocol **AND** sodium chloride 0.9% **AND** sodium chloride 0.9%, sodium phosphate IVPB, sodium phosphate IVPB, sodium phosphate IVPB    Review of patient's allergies indicates:  No Known Allergies  Objective:     Vital Signs (Most Recent):  Temp: 98.2 °F (36.8 °C) (08/08/17 0700)  Pulse: 102 (08/08/17 1100)  Resp: (!) 25 (08/08/17 1100)  BP: (!) 68/0 (08/08/17 0700)  SpO2: 97 % (08/08/17 0700) Vital Signs (24h Range):  Temp:  [98.2 °F (36.8 °C)-98.9 °F (37.2 °C)] 98.2 °F (36.8 °C)  Pulse:  [] 102  Resp:  [13-25] 25  SpO2:  [88 %-100 %] 97 %  BP: (68)/(0) 68/0  Arterial Line BP: (70-96)/(52-74) 75/52     Weight: 86.7 kg (191 lb 2.2 oz)  Body mass index is 29.06 kg/m².      Intake/Output Summary (Last 24 hours) at 08/08/17 1243  Last data filed at 08/08/17 1100   Gross per 24 hour   Intake             3018 ml   Output             2500 ml   Net              518 ml       Hemodynamic Parameters:         Physical Exam   Constitutional: He is oriented to person, place, and time. He appears  well-developed and well-nourished. No distress.   HENT:   Head: Normocephalic and atraumatic.   Eyes: EOM are normal. Pupils are equal, round, and reactive to light.   Neck: Normal range of motion. Neck supple. No JVD present.   Cardiovascular: Normal rate and regular rhythm.  Exam reveals no gallop and no friction rub.    No murmur heard.  + LVAD hum    Pulmonary/Chest: Effort normal and breath sounds normal. No respiratory distress. He has no wheezes. He has no rales.   Abdominal: Soft. He exhibits distension (mild). There is no tenderness. There is no guarding.   Musculoskeletal: Normal range of motion. He exhibits no edema.   Neurological: He is alert and oriented to person, place, and time. No cranial nerve deficit. Coordination normal.   Skin: Skin is warm and dry. He is not diaphoretic. No erythema.   Nursing note and vitals reviewed.      Significant Labs:  CBC:    Recent Labs  Lab 08/08/17 0512   WBC 13.01*   RBC 3.09*   HGB 8.5*   HCT 25.3*      MCV 82   MCH 27.5   MCHC 33.6     BNP:    Recent Labs  Lab 08/07/17 0418   BNP 1,361*     CMP:    Recent Labs  Lab 08/07/17  0418  08/08/17  1137     < > 102   CALCIUM 8.3*  < > 8.5*   ALBUMIN 2.8*  --   --    PROT 6.2  --   --      < > 137   K 3.9  < > 4.3   CO2 20*  < > 20*     < > 105   BUN 47*  < > 46*   CREATININE 1.9*  < > 1.9*   ALKPHOS 63  --   --    ALT 14  --   --    AST 21  --   --    BILITOT 1.5*  --   --    < > = values in this interval not displayed.   Coagulation:     Recent Labs  Lab 08/08/17  0512 08/08/17  1137   INR 2.5*  --    APTT 43.1* 44.3*     LDH:    Recent Labs  Lab 08/06/17  0326 08/07/17 0418 08/08/17  0512   * 231 210     Microbiology:  Microbiology Results (last 7 days)     Procedure Component Value Units Date/Time    Blood culture [[id number]] Collected:  08/04/17 1653    Order Status:  Completed Specimen:  Blood from Peripheral, Wrist, Right Updated:  08/07/17 2012     Blood Culture, Routine No  Growth to date     Blood Culture, Routine No Growth to date     Blood Culture, Routine No Growth to date     Blood Culture, Routine No Growth to date    Blood culture [[id number]] Collected:  08/04/17 1654    Order Status:  Completed Specimen:  Blood from Peripheral, Wrist, Left Updated:  08/07/17 2012     Blood Culture, Routine No Growth to date     Blood Culture, Routine No Growth to date     Blood Culture, Routine No Growth to date     Blood Culture, Routine No Growth to date    Urine culture [[id number]] Collected:  08/04/17 1655    Order Status:  Completed Specimen:  Urine from Urine, Catheterized Updated:  08/05/17 2116     Urine Culture, Routine No growth          I have reviewed all pertinent labs within the past 24 hours.    Estimated Creatinine Clearance: 40.4 mL/min (based on Cr of 1.9).    Diagnostic Results:  I have reviewed and interpreted all pertinent imaging results/findings within the past 24 hours.

## 2017-08-08 NOTE — PROGRESS NOTES
[facility]  Heart Transplant  Progress Note    Patient Name: Suman Hayden  MRN: [id number]  Admission Date: 7/18/2017  Hospital Length of Stay: 21 days  Attending Physician: Sunny Downing MD  Primary Care Provider: Joe Ernst MD  Principal Problem:Acute on chronic combined systolic and diastolic heart failure    Subjective:     Interval History: BM last night and flatus now     Continuous Infusions:   amiodarone 0.5 mg/min (08/08/17 0905)    dextrose 5 % and 0.9 % NaCl 80 mL/hr at 08/08/17 0600    DOBUTamine 5 mcg/kg/min (08/08/17 0600)    furosemide (LASIX) 1 mg/mL infusion (non-titrating) 10 mg/hr (08/08/17 0600)    heparin (porcine) in 5 % dex 600 Units/hr (08/08/17 0600)    nicardipine Stopped (08/05/17 0600)     Scheduled Meds:   amiodarone  400 mg Oral BID    amlodipine  5 mg Oral Daily    aspirin  300 mg Rectal Daily    pantoprazole  40 mg Intravenous Daily    pravastatin  20 mg Oral QHS    sodium chloride 0.9%  10 mL Intravenous Q6H    sodium chloride 0.9%  3 mL Intravenous Q8H    warfarin  2 mg Oral Once     PRN Meds:acetaminophen, albumin human 5%, albuterol sulfate, bisacodyl, fentaNYL, hydrALAZINE, magnesium hydroxide 400 mg/5 ml, magnesium sulfate IVPB, ondansetron, potassium chloride, potassium chloride, Flushing PICC Protocol **AND** sodium chloride 0.9% **AND** sodium chloride 0.9%, sodium phosphate IVPB, sodium phosphate IVPB, sodium phosphate IVPB    Review of patient's allergies indicates:  No Known Allergies  Objective:     Vital Signs (Most Recent):  Temp: 98.2 °F (36.8 °C) (08/08/17 0700)  Pulse: 102 (08/08/17 1100)  Resp: (!) 25 (08/08/17 1100)  BP: (!) 68/0 (08/08/17 0700)  SpO2: 97 % (08/08/17 0700) Vital Signs (24h Range):  Temp:  [98.2 °F (36.8 °C)-98.9 °F (37.2 °C)] 98.2 °F (36.8 °C)  Pulse:  [] 102  Resp:  [13-25] 25  SpO2:  [88 %-100 %] 97 %  BP: (68)/(0) 68/0  Arterial Line BP: (70-96)/(52-74) 75/52     Weight: 86.7 kg (191 lb 2.2 oz)  Body  mass index is 29.06 kg/m².      Intake/Output Summary (Last 24 hours) at 08/08/17 1243  Last data filed at 08/08/17 1100   Gross per 24 hour   Intake             3018 ml   Output             2500 ml   Net              518 ml       Hemodynamic Parameters:         Physical Exam   Constitutional: He is oriented to person, place, and time. He appears well-developed and well-nourished. No distress.   HENT:   Head: Normocephalic and atraumatic.   Eyes: EOM are normal. Pupils are equal, round, and reactive to light.   Neck: Normal range of motion. Neck supple. No JVD present.   Cardiovascular: Normal rate and regular rhythm.  Exam reveals no gallop and no friction rub.    No murmur heard.  + LVAD hum    Pulmonary/Chest: Effort normal and breath sounds normal. No respiratory distress. He has no wheezes. He has no rales.   Abdominal: Soft. He exhibits distension (mild). There is no tenderness. There is no guarding.   Musculoskeletal: Normal range of motion. He exhibits no edema.   Neurological: He is alert and oriented to person, place, and time. No cranial nerve deficit. Coordination normal.   Skin: Skin is warm and dry. He is not diaphoretic. No erythema.   Nursing note and vitals reviewed.      Significant Labs:  CBC:    Recent Labs  Lab 08/08/17  0512   WBC 13.01*   RBC 3.09*   HGB 8.5*   HCT 25.3*      MCV 82   MCH 27.5   MCHC 33.6     BNP:    Recent Labs  Lab 08/07/17  0418   BNP 1,361*     CMP:    Recent Labs  Lab 08/07/17 0418  08/08/17  1137     < > 102   CALCIUM 8.3*  < > 8.5*   ALBUMIN 2.8*  --   --    PROT 6.2  --   --      < > 137   K 3.9  < > 4.3   CO2 20*  < > 20*     < > 105   BUN 47*  < > 46*   CREATININE 1.9*  < > 1.9*   ALKPHOS 63  --   --    ALT 14  --   --    AST 21  --   --    BILITOT 1.5*  --   --    < > = values in this interval not displayed.   Coagulation:     Recent Labs  Lab 08/08/17  0512 08/08/17  1137   INR 2.5*  --    APTT 43.1* 44.3*     LDH:    Recent Labs  Lab  08/06/17  0326 08/07/17  0418 08/08/17  0512   * 231 210     Microbiology:  Microbiology Results (last 7 days)     Procedure Component Value Units Date/Time    Blood culture [[id number]] Collected:  08/04/17 1653    Order Status:  Completed Specimen:  Blood from Peripheral, Wrist, Right Updated:  08/07/17 2012     Blood Culture, Routine No Growth to date     Blood Culture, Routine No Growth to date     Blood Culture, Routine No Growth to date     Blood Culture, Routine No Growth to date    Blood culture [[id number]] Collected:  08/04/17 1654    Order Status:  Completed Specimen:  Blood from Peripheral, Wrist, Left Updated:  08/07/17 2012     Blood Culture, Routine No Growth to date     Blood Culture, Routine No Growth to date     Blood Culture, Routine No Growth to date     Blood Culture, Routine No Growth to date    Urine culture [[id number]] Collected:  08/04/17 1655    Order Status:  Completed Specimen:  Urine from Urine, Catheterized Updated:  08/05/17 2116     Urine Culture, Routine No growth          I have reviewed all pertinent labs within the past 24 hours.    Estimated Creatinine Clearance: 40.4 mL/min (based on Cr of 1.9).    Diagnostic Results:  I have reviewed and interpreted all pertinent imaging results/findings within the past 24 hours.    Assessment and Plan:     Atrial fibrillation    - c/w ac  - c/w amio gtt        LVAD (left ventricular assist device) present    - LVAD HM III  - Speed 5200  - no events  - LDH stable  - INR theraputic   -NO off to floor today        Urine culture positive    - afebrile, treated with IV cefipime         Atrial tachycardia    - [id number] - 3  - c/w amiodarone        AICD discharge    - appropriate in the setting of VT aggravated by underlying AT/AFL (earlier during the admission)  - 7/28 - setting of AF undersense - device reprogrammed to VVI 80  - tachy therapy turned off  - on amio gtt  - EP planning to do lead revision once INR theraputic         Hepatitis B  core antibody positive since 2012    - will defer liver biopsy as CTS not requiring it  - ID consult cleared the patient for sx  - case discussed with hepatology, low suspicion for liver involvement        V-tach    - s/p amiodarone reload - now on amio gtt        Hyperlipidemia    - c/w pravastatin        * Acute on chronic combined systolic and diastolic heart failure    - CTS Primary  - s/p LVAD HM III (7/27/17), speed @ 5200 rpm  - chest closure (7/28/17)  - extubated (7/29/17)  - on , now off epi , lasix 10.   -I/O/N + 1.2 L                     MELISSA Long  Heart Transplant  [facility]

## 2017-08-08 NOTE — PT/OT/SLP PROGRESS
"Physical Therapy  Treatment    Suman Hayden   MRN: [id number]   Admitting Diagnosis: Acute on chronic combined systolic and diastolic heart failure    PT Received On: 08/08/17  PT Start Time: 1029     PT Stop Time: 1054    PT Total Time (min): 25 min       Billable Minutes:  Gait [id number] min    Treatment Type: Treatment  PT/PTA: PT     PTA Visit Number: 0       General Precautions: Standard, LVAD, fall, sternal  Orthopedic Precautions: N/A   Braces:      Do you have any cultural, spiritual, Jainism conflicts, given your current situation?: none noted     Subjective:  Communicated with nurse prior to session.      Pain/Comfort  Pain Rating 1: 0/10  Pain Rating Post-Intervention 1: 0/10    Objective:   Patient found with: telemetry, blood pressure cuff, pulse ox (continuous), chest tube, arterial line (LVAD)    Functional Mobility:  Bed Mobility:   Rolling/Turning to Left:  (not tested. pt was sitting in chair for treatment.)    Transfers:  Sit <> Stand Assistance: Moderate Assistance (pt needed verbal cues for functional mobility with sternal precautions.)    Gait:   Gait Distance: 90 ft, 140 ft, 52 ft with sitting rest periods between distance ambulated. pt had emergency bag present and nursing with portable monitor.   Assistance 1: Total assistance (pt was CGA with gait but additional assist of 1 for equipment. )  Gait Assistive Device:  (liven good IV pole)      Therapeutic Activities and Exercises:   pt was supervision switching from LVAD battery to power base. Pt was still not able to name "emergency bag" nor state contents of bag.      AM-PAC 6 CLICK MOBILITY  How much help from another person does this patient currently need?   1 = Unable, Total/Dependent Assistance  2 = A lot, Maximum/Moderate Assistance  3 = A little, Minimum/Contact Guard/Supervision  4 = None, Modified [place]/Independent    Turning over in bed (including adjusting bedclothes, sheets and blankets)?: 2  Sitting down on and standing " up from a chair with arms (e.g., wheelchair, bedside commode, etc.): 2  Moving from lying on back to sitting on the side of the bed?: 2  Moving to and from a bed to a chair (including a wheelchair)?: 3  Need to walk in hospital room?: 3  Climbing 3-5 steps with a railing?: 1  Total Score: 13    AM-PAC Raw Score CMS G-Code Modifier Level of Impairment Assistance   6 % Total / Unable   7 - 9 CM 80 - 100% Maximal Assist   10 - 14 CL 60 - 80% Moderate Assist   15 - 19 CK 40 - 60% Moderate Assist   20 - 22 CJ 20 - 40% Minimal Assist   23 CI 1-20% SBA / CGA   24 CH 0% Independent/ Mod I     Patient left up in chair with all lines intact, call button in reach and wife. present.    Assessment:  Suman Hayden is a 67 y.o. 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 Pt would benefit from cont PT to address deficits and return pt to Independent status. Pt will benefit from skilled PT 6x/wk to progress physically. Pt is s/p LVAD HM3 placement. 7/27, chest closure 7/28/17.     Rehab identified problem list/impairments: Rehab identified problem list/impairments: weakness, impaired endurance, impaired functional mobilty, gait instability, impaired balance    Rehab potential is good.    Activity tolerance: Good    Discharge recommendations: Discharge Facility/Level Of Care Needs: home health PT     Barriers to discharge: Barriers to Discharge: None    Equipment recommendations: Equipment Needed After Discharge:  (will determine DME needs closer to discharge.)     GOALS:    Physical Therapy Goals        Problem: Physical Therapy Goal    Goal Priority Disciplines Outcome Goal Variances Interventions   Physical Therapy Goal     PT/OT, PT Ongoing (interventions implemented as appropriate)     Description:  Goals to be met by: 8/12/17     Patient will increase functional independence with  mobility by performin. Supine to sit with MInimal Assistance- not met  2. Sit to supine with MInimal Assistance - not met  3. Sit to stand transfer with Minimal Assistance- not met  4. Bed to chair transfer with Minimal Assistance- not met  5. Gait  x 50 feet with Minimal Assistance. - not met  6. Lower extremity exercise program x15 reps, with supervision, in order to increase LE strength and (I) with functional mobility. - not met                       PLAN:    Patient to be seen 6 x/week  to address the above listed problems via gait training, therapeutic activities, therapeutic exercises  Plan of Care expires: 17  Plan of Care reviewed with: patient, spouse         Astrid DUSTY Whaley, PT  2017

## 2017-08-08 NOTE — PROGRESS NOTES
Progress Note  Surgical Intensive Care    Admit Date: 7/18/2017  Post-operative Day: 11 Days Post-Op  Hospital Day: 22    SUBJECTIVE:     Follow-up For:  Procedure(s) (LRB):  CLOSURE-STERNAL WOUND (N/A)  PLACEMENT-NEOPERICARDIUM (N/A)   S/p sternotomy closure 7/18/17    Interval history: VSS. NGT removed 8/7/17. Denies N/V, abdominal distension. Continues to be on Amio, Heparin, Furosemide, Dobutamine gtt. Chest tube with minimal serosanguinous output overnight. UOP adequate.     Continuous Infusions:   amiodarone 0.5 mg/min (08/08/17 0600)    dextrose 5 % and 0.9 % NaCl 80 mL/hr at 08/08/17 0600    DOBUTamine 5 mcg/kg/min (08/08/17 0600)    furosemide (LASIX) 1 mg/mL infusion (non-titrating) 10 mg/hr (08/08/17 0600)    heparin (porcine) in 5 % dex 600 Units/hr (08/08/17 0600)    nicardipine Stopped (08/05/17 0600)     Scheduled Meds:   amlodipine  5 mg Oral Daily    aspirin  300 mg Rectal Daily    ceFAZolin (ANCEF) IVPB  2 g Intravenous Q8H    ceFAZolin 2 g/50mL Dextrose IVPB  2 g Intravenous 30 Min Pre-Op    pantoprazole  40 mg Intravenous Daily    pravastatin  20 mg Oral QHS    sodium chloride 0.9%  10 mL Intravenous Q6H    sodium chloride 0.9%  3 mL Intravenous Q8H     PRN Meds:acetaminophen, albumin human 5%, albuterol sulfate, bisacodyl, fentaNYL, hydrALAZINE, magnesium hydroxide 400 mg/5 ml, magnesium sulfate IVPB, ondansetron, potassium chloride, potassium chloride, Flushing PICC Protocol **AND** sodium chloride 0.9% **AND** sodium chloride 0.9%, sodium phosphate IVPB, sodium phosphate IVPB, sodium phosphate IVPB    Review of patient's allergies indicates:  No Known Allergies    OBJECTIVE:     Vital Signs (Most Recent)  Temp: 98.5 °F (36.9 °C) (08/08/17 0300)  Pulse: 82 (08/08/17 0600)  Resp: 14 (08/08/17 0600)  BP: (!) 66/0 (08/07/17 1200)  SpO2: 99 % (08/08/17 0600)    Vital Signs Range (Last 24H):  Temp:  [98.4 °F (36.9 °C)-98.9 °F (37.2 °C)]   Pulse:  []   Resp:  [14-28]   BP:  (66-70)/(0)   SpO2:  [81 %-100 %]   Arterial Line BP: (67-96)/(51-74)     I & O (Last 24H):    Intake/Output Summary (Last 24 hours) at 08/08/17 0619  Last data filed at 08/08/17 0600   Gross per 24 hour   Intake             3018 ml   Output             2760 ml   Net              258 ml        Physical Exam   Constitutional: He appears well-developed and well-nourished. No distress. Resting comfortably   HENT:   Head: Normocephalic and atraumatic.   Eyes: Right eye exhibits no discharge. Left eye exhibits no discharge. No scleral icterus.   Neck: Normal range of motion. Neck supple.   Cardiovascular: Intact distal pulses.    LVAD hum present.   Pulmonary/Chest: Effort normal. No respiratory distress.   R and L meds chest tubes in place- minimal serosanguinous drainage.  Abdominal: Soft. He exhibits no distension.   Skin: Skin is warm and dry.     Laboratory (Last 24H):  CBC:    Recent Labs  Lab 08/08/17  0512   WBC 13.01*   HGB 8.5*   HCT 25.3*        CMP:    Recent Labs  Lab 08/08/17  0512   CALCIUM 8.2*      K 3.6   CO2 22*      BUN 47*   CREATININE 1.7*       ASSESSMENT/PLAN:     Neuro:   - AAOx3  -off sedation  -continue current pain mgmt     Pulmonary:   -Sating well RA  -Chest tubes - R med and 2 L meds. Minimal output     Cardiac:  -MAP range goal >65  -Dobutamine gtt 5  -Amiodarone 6.25  -Off Epi and Cardene gtt    Renal:   -Singer in place  -Bun/Cr stable 43/1.8  -UOP >100cc/hr  -lasix gtt     Fluids/Electrolytes/Nutrition/GI:   -NGT pulled.   - Advance diet as tolerates     Hematology/Oncology:  -on heparin gtt  -Aspirin 325mg     Infectious Disease:   -Afebrile   -WBC trending down 8.9  -cefepime D/C  -Cultures 8/4 NGTD    Endocrine:  -endocrine following  -bg goal 140-180  -off insulin gtt     Dispo:  -Continue care in the ICU setting. Out of bed and in chair and PT. Likely stepdown today or tomorrow.     Avery Crystal  CA2  P: [phone]

## 2017-08-08 NOTE — PLAN OF CARE
Problem: Occupational Therapy Goal  Goal: Occupational Therapy Goal  Goals to be met by:  2 weeks 8/12/17     Patient will increase functional independence with ADLs by performing:  Feeding: Independent   UE Dressing with Supervision.  LE Dressing with Supervision.  Grooming while standing with Supervision.  Toileting from toilet with Supervision for hygiene and clothing management.   Stand pivot transfers with Supervision.  Toilet transfer to toilet with Supervision.  Pt will be independent with LVAD yasmin't.      Outcome: Ongoing (interventions implemented as appropriate)  The above goals remain appropriate. DELMY Lucero  8/8/2017

## 2017-08-08 NOTE — PT/OT/SLP PROGRESS
"Occupational Therapy  Treatment    Suman Hayden   MRN: [id number]   Admitting Diagnosis: Acute on chronic combined systolic and diastolic heart failure    OT Date of Treatment: 08/08/17   OT Start Time: 0925  OT Stop Time: 0958  OT Total Time (min): 33 min    Billable Minutes:  Therapeutic Activity 28    General Precautions: Standard, LVAD, fall, sternal  Orthopedic Precautions:    Braces:           Subjective:  Communicated with RN prior to session.  "I'm sorry."  Wife asks, "Can we call this the pouch?" refererring to consolidation bag    Pain/Comfort  Pain Rating 1: 0/10  Pain Rating Post-Intervention 1: 0/10    Objective:  Patient found with: blood pressure cuff, pulse ox (continuous), telemetry, arterial line     Functional Mobility:  Bed Mobility:  Pt UIC upon OT arrival       Transfers:   Sit <> Stand Assistance: Minimum Assistance (from b/s chair)  Sit <> Stand Assistive Device: No Assistive Device    Functional Ambulation: Minimal A x 3 steps during toileting    Activities of Daily Living:  Feeding Level of Assistance: Set-up Assistance  Feeding adaptive equipment:   UE Dressing Level of Assistance: Moderate assistance  UE adaptive equipment:   LE Dressing Level of Assistance: Total assistance  LE adaptive equipment:   Grooming Position: Standing  Grooming Level of Assistance: Contact guard assistance  Toileting Where Assessed: Other (Comment) (urinal in standing and bed pan in standing)  Toileting Level of Assistance: Maximum assistance     Therapeutic Activities and Exercises:  Pt seen for continued VAD education along with self-care. Pt performed Self Test and wife recorded numbers and verbalized batteries to be used in rotational order. Pt unable to identify Controller when asked (pointed to drive line) or name power cords. Pt re-ed on all external parts of LVAD including batteries and clips. Pt transitioned to battery power with moderate cueing and minimal A. Pt required cueing on sequencing as he " "attempted to undo power cord prior to checking battery gauge or placing battery in clips. Cueing needed to check battery power, then pt able to place in clips, disconnect power cords one at a time and connect to batteries. Pt required max A to organize and don consolidation bag    AM-PAC 6 CLICK ADL   How much help from another person does this patient currently need?   1 = Unable, Total/Dependent Assistance  2 = A lot, Maximum/Moderate Assistance  3 = A little, Minimum/Contact Guard/Supervision  4 = None, Modified [place]/Independent    Putting on and taking off regular lower body clothing? : 1  Bathing (including washing, rinsing, drying)?: 2  Toileting, which includes using toilet, bedpan, or urinal? : 2  Putting on and taking off regular upper body clothing?: 2  Taking care of personal grooming such as brushing teeth?: 3  Eating meals?: 3  Total Score: 13     AM-PAC Raw Score CMS "G-Code Modifier Level of Impairment Assistance   6 % Total / Unable   7 - 8 CM 80 - 100% Maximal Assist   9-13 CL 60 - 80% Moderate Assist   14 - 19 CK 40 - 60% Moderate Assist   20 - 22 CJ 20 - 40% Minimal Assist   23 CI 1-20% SBA / CGA   24 CH 0% Independent/ Mod I       Patient left up in chair with all lines intact, call button in reach, RN notified and spouse present    ASSESSMENT:  Suman Hayden is a 67 y.o. male with a medical diagnosis of Acute on chronic combined systolic and diastolic heart failure and presents with weakness, decreased endurance and decreased (I) with VAD management s/p LVAD placement.    Rehab identified problem list/impairments: Rehab identified problem list/impairments: weakness, impaired endurance, impaired self care skills, impaired functional mobilty, decreased coordination, impaired balance, gait instability, decreased upper extremity function, impaired coordination, impaired cardiopulmonary response to activity    Rehab potential is good.    Activity tolerance: Good    Discharge " recommendations: Discharge Facility/Level Of Care Needs: home with home health     Barriers to discharge: Barriers to Discharge: None    Equipment recommendations:  (TBD closer to d/c)     GOALS:    Occupational Therapy Goals        Problem: Occupational Therapy Goal    Goal Priority Disciplines Outcome Interventions   Occupational Therapy Goal     OT, PT/OT Ongoing (interventions implemented as appropriate)    Description:  Goals to be met by:  2 weeks 8/12/17     Patient will increase functional independence with ADLs by performing:  Feeding: Independent   UE Dressing with Supervision.  LE Dressing with Supervision.  Grooming while standing with Supervision.  Toileting from toilet with Supervision for hygiene and clothing management.   Stand pivot transfers with Supervision.  Toilet transfer to toilet with Supervision.  Pt will be independent with LVAD yasmin't.                 Multidisciplinary Problems (Resolved)        Problem: Occupational Therapy Goal    Goal Priority Disciplines Outcome Interventions   Occupational Therapy Goal   (Resolved)     OT, PT/OT  Error    Description:  Goals to be met by: 8/04/2017    Patient will increase functional independence with ADLs by performing:    Increased functional strength to 5/5 for improved ADL performance.  Upper extremity exercise program and R LE ankle pumps  3x 10 reps per handout, with independence.                      Plan:  Patient to be seen 6 x/week to address the above listed problems via self-care/home management, therapeutic activities, therapeutic exercises  Plan of Care expires: 08/29/17  Plan of Care reviewed with: patient, spouse         DELMY Lucero  08/08/2017

## 2017-08-08 NOTE — ASSESSMENT & PLAN NOTE
-INR 2.5.  -Per staff, will likely switch the RV and LV ports as he does not need to BiV pace but simply sense for VT/VF protection   -ICD is off but patient has pads to chest wall in case it is needed  -has frequent afib with PVCs at his baseline and this continues  -Has LVAD (HMIII) in place at this time  -procedure will likely be done once ileus resolves so dependant upon patient's gi function

## 2017-08-08 NOTE — SUBJECTIVE & OBJECTIVE
Interval History: no acute events overnight.  Procedure on hold as patient has ileus    Review of Systems   Constitution: Negative.   HENT: Negative.    Eyes: Negative.    Cardiovascular: Negative.    Respiratory: Negative.    Endocrine: Negative.    Skin: Negative.    Musculoskeletal: Negative.    Gastrointestinal: Positive for bloating.   Genitourinary: Negative.    Neurological: Negative.      Objective:     Vital Signs (Most Recent):  Temp: 98.2 °F (36.8 °C) (08/08/17 0700)  Pulse: (!) 118 (08/08/17 1300)  Resp: 20 (08/08/17 1300)  BP: (!) 68/0 (08/08/17 0700)  SpO2: 97 % (08/08/17 0700) Vital Signs (24h Range):  Temp:  [98.2 °F (36.8 °C)-98.9 °F (37.2 °C)] 98.2 °F (36.8 °C)  Pulse:  [] 118  Resp:  [13-25] 20  SpO2:  [90 %-100 %] 97 %  BP: (68)/(0) 68/0  Arterial Line BP: (70-96)/(52-74) 76/61     Weight: 86.7 kg (191 lb 2.2 oz)  Body mass index is 29.06 kg/m².     SpO2: 97 %  O2 Device (Oxygen Therapy): room air    Physical Exam    Significant Labs: EP:   Recent Labs  Lab 08/07/17  0418  08/07/17  2208 08/08/17  0512 08/08/17  1137     < > 138 138 137   K 3.9  < > 3.6 3.6 4.3     < > 106 104 105   CO2 20*  < > 21* 22* 20*     < > 94 93 102   BUN 47*  < > 47* 47* 46*   CREATININE 1.9*  < > 1.7* 1.7* 1.9*   CALCIUM 8.3*  < > 8.0* 8.2* 8.5*   PROT 6.2  --   --   --   --    ALBUMIN 2.8*  --   --   --   --    BILITOT 1.5*  --   --   --   --    ALKPHOS 63  --   --   --   --    AST 21  --   --   --   --    ALT 14  --   --   --   --    ANIONGAP 12  < > 11 12 12   ESTGFRAFRICA 41.3*  < > 47.2* 47.2* 41.3*   EGFRNONAA 35.7*  < > 40.8* 40.8* 35.7*   WBC 13.87*  --   --  13.01*  --    HGB 8.6*  --   --  8.5*  --    HCT 25.3*  --   --  25.3*  --      --   --  334  --    INR 2.5*  --   --  2.5*  --    < > = values in this interval not displayed.    Significant Imaging: Echocardiogram:   2D echo with color flow doppler:   Results for orders placed or performed during the hospital encounter of  07/18/17   2D echo with color flow doppler   Result Value Ref Range    EF 10 (A) 55 - 65    Mitral Valve Regurgitation MILD     Diastolic Dysfunction Yes (A)     Est. PA Systolic Pressure 25.81     Mitral Valve Mobility NORMAL

## 2017-08-09 ENCOUNTER — ANESTHESIA (OUTPATIENT)
Dept: INTENSIVE CARE | Facility: HOSPITAL | Age: 67
DRG: 001 | End: 2017-08-09
Payer: MEDICARE

## 2017-08-09 ENCOUNTER — ANESTHESIA EVENT (OUTPATIENT)
Dept: INTENSIVE CARE | Facility: HOSPITAL | Age: 67
DRG: 001 | End: 2017-08-09
Payer: MEDICARE

## 2017-08-09 PROBLEM — I50.43 ACUTE ON CHRONIC COMBINED SYSTOLIC AND DIASTOLIC HEART FAILURE: Status: RESOLVED | Noted: 2017-07-04 | Resolved: 2017-08-09

## 2017-08-09 LAB
ABO + RH BLD: NORMAL
ALBUMIN SERPL BCP-MCNC: 1.9 G/DL
ALBUMIN SERPL BCP-MCNC: 2.2 G/DL
ALBUMIN SERPL BCP-MCNC: 2.9 G/DL
ALBUMIN SERPL BCP-MCNC: 3 G/DL
ALBUMIN SERPL BCP-MCNC: 3.2 G/DL
ALBUMIN SERPL BCP-MCNC: 3.3 G/DL
ALLENS TEST: ABNORMAL
ALP SERPL-CCNC: 50 U/L
ALP SERPL-CCNC: 51 U/L
ALP SERPL-CCNC: 56 U/L
ALP SERPL-CCNC: 57 U/L
ALP SERPL-CCNC: 68 U/L
ALP SERPL-CCNC: 77 U/L
ALT SERPL W/O P-5'-P-CCNC: 34 U/L
ALT SERPL W/O P-5'-P-CCNC: 35 U/L
ALT SERPL W/O P-5'-P-CCNC: 87 U/L
ALT SERPL W/O P-5'-P-CCNC: 89 U/L
ALT SERPL W/O P-5'-P-CCNC: 92 U/L
ALT SERPL W/O P-5'-P-CCNC: 96 U/L
ANION GAP SERPL CALC-SCNC: 16 MMOL/L
ANION GAP SERPL CALC-SCNC: 16 MMOL/L
ANION GAP SERPL CALC-SCNC: 17 MMOL/L
ANION GAP SERPL CALC-SCNC: 17 MMOL/L
ANION GAP SERPL CALC-SCNC: 19 MMOL/L
ANISOCYTOSIS BLD QL SMEAR: SLIGHT
APTT BLDCRRT: 38.2 SEC
APTT BLDCRRT: 38.2 SEC
AST SERPL-CCNC: 153 U/L
AST SERPL-CCNC: 198 U/L
AST SERPL-CCNC: 240 U/L
AST SERPL-CCNC: 271 U/L
AST SERPL-CCNC: 93 U/L
AST SERPL-CCNC: 93 U/L
BACTERIA BLD CULT: NORMAL
BACTERIA BLD CULT: NORMAL
BASOPHILS # BLD AUTO: 0 K/UL
BASOPHILS # BLD AUTO: 0.01 K/UL
BASOPHILS NFR BLD: 0 %
BASOPHILS NFR BLD: 0.1 %
BILIRUB DIRECT SERPL-MCNC: 1 MG/DL
BILIRUB SERPL-MCNC: 1.5 MG/DL
BILIRUB SERPL-MCNC: 1.6 MG/DL
BILIRUB SERPL-MCNC: 2.2 MG/DL
BILIRUB SERPL-MCNC: 2.4 MG/DL
BILIRUB SERPL-MCNC: 2.6 MG/DL
BILIRUB SERPL-MCNC: 3 MG/DL
BLD GP AB SCN CELLS X3 SERPL QL: NORMAL
BLD PROD TYP BPU: NORMAL
BLOOD UNIT EXPIRATION DATE: NORMAL
BLOOD UNIT TYPE CODE: 5100
BLOOD UNIT TYPE: NORMAL
BNP SERPL-MCNC: 1232 PG/ML
BNP SERPL-MCNC: 1232 PG/ML
BUN SERPL-MCNC: 51 MG/DL
BUN SERPL-MCNC: 51 MG/DL
BUN SERPL-MCNC: 52 MG/DL
BUN SERPL-MCNC: 52 MG/DL
BUN SERPL-MCNC: 53 MG/DL
BUN SERPL-MCNC: 53 MG/DL
BUN SERPL-MCNC: 57 MG/DL
BURR CELLS BLD QL SMEAR: ABNORMAL
CALCIUM SERPL-MCNC: 7.3 MG/DL
CALCIUM SERPL-MCNC: 7.3 MG/DL
CALCIUM SERPL-MCNC: 7.6 MG/DL
CALCIUM SERPL-MCNC: 7.7 MG/DL
CALCIUM SERPL-MCNC: 7.8 MG/DL
CHLORIDE SERPL-SCNC: 103 MMOL/L
CHLORIDE SERPL-SCNC: 103 MMOL/L
CHLORIDE SERPL-SCNC: 104 MMOL/L
CHLORIDE SERPL-SCNC: 105 MMOL/L
CHLORIDE SERPL-SCNC: 106 MMOL/L
CO2 SERPL-SCNC: 13 MMOL/L
CO2 SERPL-SCNC: 20 MMOL/L
CO2 SERPL-SCNC: 21 MMOL/L
CO2 SERPL-SCNC: 22 MMOL/L
CO2 SERPL-SCNC: 22 MMOL/L
CODING SYSTEM: NORMAL
CREAT SERPL-MCNC: 2.2 MG/DL
CREAT SERPL-MCNC: 2.3 MG/DL
CREAT SERPL-MCNC: 2.4 MG/DL
CRP SERPL-MCNC: 60.8 MG/L
CRP SERPL-MCNC: 60.8 MG/L
DELSYS: ABNORMAL
DIFFERENTIAL METHOD: ABNORMAL
DISPENSE STATUS: NORMAL
EOSINOPHIL # BLD AUTO: 0 K/UL
EOSINOPHIL NFR BLD: 0 %
EOSINOPHIL NFR BLD: 0 %
EOSINOPHIL NFR BLD: 0.2 %
ERYTHROCYTE [DISTWIDTH] IN BLOOD BY AUTOMATED COUNT: 15.7 %
ERYTHROCYTE [DISTWIDTH] IN BLOOD BY AUTOMATED COUNT: 15.7 %
ERYTHROCYTE [DISTWIDTH] IN BLOOD BY AUTOMATED COUNT: 15.8 %
ERYTHROCYTE [SEDIMENTATION RATE] IN BLOOD BY WESTERGREN METHOD: 12 MM/H
ERYTHROCYTE [SEDIMENTATION RATE] IN BLOOD BY WESTERGREN METHOD: 14 MM/H
ERYTHROCYTE [SEDIMENTATION RATE] IN BLOOD BY WESTERGREN METHOD: 16 MM/H
EST. GFR  (AFRICAN AMERICAN): 31.1 ML/MIN/1.73 M^2
EST. GFR  (AFRICAN AMERICAN): 32.7 ML/MIN/1.73 M^2
EST. GFR  (AFRICAN AMERICAN): 34.6 ML/MIN/1.73 M^2
EST. GFR  (NON AFRICAN AMERICAN): 26.9 ML/MIN/1.73 M^2
EST. GFR  (NON AFRICAN AMERICAN): 28.3 ML/MIN/1.73 M^2
EST. GFR  (NON AFRICAN AMERICAN): 29.9 ML/MIN/1.73 M^2
FIO2: 100
FIO2: 50
FIO2: 57
FIO2: 60
FIO2: 60
FIO2: 96
FLOW: 4
GIANT PLATELETS BLD QL SMEAR: PRESENT
GIANT PLATELETS BLD QL SMEAR: PRESENT
GLUCOSE SERPL-MCNC: 100 MG/DL
GLUCOSE SERPL-MCNC: 125 MG/DL
GLUCOSE SERPL-MCNC: 137 MG/DL
GLUCOSE SERPL-MCNC: 141 MG/DL
GLUCOSE SERPL-MCNC: 83 MG/DL
GLUCOSE SERPL-MCNC: 83 MG/DL
GLUCOSE SERPL-MCNC: 89 MG/DL
HCO3 UR-SCNC: 13.5 MMOL/L (ref 24–28)
HCO3 UR-SCNC: 15.1 MMOL/L (ref 24–28)
HCO3 UR-SCNC: 17.9 MMOL/L (ref 24–28)
HCO3 UR-SCNC: 19.2 MMOL/L (ref 24–28)
HCO3 UR-SCNC: 20 MMOL/L (ref 24–28)
HCO3 UR-SCNC: 21.6 MMOL/L (ref 24–28)
HCO3 UR-SCNC: 21.7 MMOL/L (ref 24–28)
HCO3 UR-SCNC: 21.9 MMOL/L (ref 24–28)
HCO3 UR-SCNC: 22.9 MMOL/L (ref 24–28)
HCO3 UR-SCNC: 23.6 MMOL/L (ref 24–28)
HCO3 UR-SCNC: 24 MMOL/L (ref 24–28)
HCO3 UR-SCNC: 25.7 MMOL/L (ref 24–28)
HCT VFR BLD AUTO: 24.5 %
HCT VFR BLD AUTO: 24.5 %
HCT VFR BLD AUTO: 26.6 %
HCT VFR BLD AUTO: 26.9 %
HCT VFR BLD AUTO: 28.8 %
HCT VFR BLD CALC: 29 %PCV (ref 36–54)
HGB BLD-MCNC: 8.4 G/DL
HGB BLD-MCNC: 8.4 G/DL
HGB BLD-MCNC: 9.2 G/DL
HGB BLD-MCNC: 9.3 G/DL
HGB BLD-MCNC: 9.7 G/DL
HYPOCHROMIA BLD QL SMEAR: ABNORMAL
INR PPP: 3.3
INR PPP: 3.3
LDH SERPL L TO P-CCNC: 3.2 MMOL/L (ref 0.36–1.25)
LDH SERPL L TO P-CCNC: 3.56 MMOL/L (ref 0.36–1.25)
LDH SERPL L TO P-CCNC: 335 U/L
LDH SERPL L TO P-CCNC: 5.32 MMOL/L (ref 0.36–1.25)
LDH SERPL L TO P-CCNC: 6.65 MMOL/L (ref 0.36–1.25)
LDH SERPL L TO P-CCNC: 8.15 MMOL/L (ref 0.36–1.25)
LDH SERPL L TO P-CCNC: 8.6 MMOL/L (ref 0.36–1.25)
LDH SERPL L TO P-CCNC: 8.8 MMOL/L (ref 0.36–1.25)
LDH SERPL L TO P-CCNC: 8.87 MMOL/L (ref 0.36–1.25)
LDH SERPL L TO P-CCNC: 8.97 MMOL/L (ref 0.36–1.25)
LYMPHOCYTES # BLD AUTO: 0.8 K/UL
LYMPHOCYTES # BLD AUTO: 0.8 K/UL
LYMPHOCYTES # BLD AUTO: 0.9 K/UL
LYMPHOCYTES NFR BLD: 12.9 %
LYMPHOCYTES NFR BLD: 16.9 %
LYMPHOCYTES NFR BLD: 18.2 %
LYMPHOCYTES NFR BLD: 6.5 %
LYMPHOCYTES NFR BLD: 6.5 %
MAGNESIUM SERPL-MCNC: 1.9 MG/DL
MAGNESIUM SERPL-MCNC: 1.9 MG/DL
MAGNESIUM SERPL-MCNC: 2.1 MG/DL
MCH RBC QN AUTO: 26.9 PG
MCH RBC QN AUTO: 27.5 PG
MCHC RBC AUTO-ENTMCNC: 33.7 G/DL
MCHC RBC AUTO-ENTMCNC: 34.3 G/DL
MCHC RBC AUTO-ENTMCNC: 34.3 G/DL
MCHC RBC AUTO-ENTMCNC: 34.6 G/DL
MCHC RBC AUTO-ENTMCNC: 34.6 G/DL
MCV RBC AUTO: 80 FL
METHEMOGLOBIN: 1.1 % (ref 0–3)
MIN VOL: 15.3
MIN VOL: 6.1
MIN VOL: 7
MIN VOL: 7.56
MIN VOL: 7.94
MIN VOL: 8
MIN VOL: 8
MIN VOL: 9
MIN VOL: 9
MIN VOL: 9.5
MITRAL VALVE MOBILITY: NORMAL
MODE: ABNORMAL
MONOCYTES # BLD AUTO: 0.2 K/UL
MONOCYTES # BLD AUTO: 0.3 K/UL
MONOCYTES # BLD AUTO: 0.4 K/UL
MONOCYTES # BLD AUTO: 0.9 K/UL
MONOCYTES # BLD AUTO: 0.9 K/UL
MONOCYTES NFR BLD: 4.7 %
MONOCYTES NFR BLD: 4.7 %
MONOCYTES NFR BLD: 5.7 %
MONOCYTES NFR BLD: 7.3 %
MONOCYTES NFR BLD: 7.3 %
NEUTROPHILS # BLD AUTO: 10.6 K/UL
NEUTROPHILS # BLD AUTO: 10.6 K/UL
NEUTROPHILS # BLD AUTO: 3.8 K/UL
NEUTROPHILS # BLD AUTO: 4.2 K/UL
NEUTROPHILS # BLD AUTO: 5.6 K/UL
NEUTROPHILS NFR BLD: 76.9 %
NEUTROPHILS NFR BLD: 78.4 %
NEUTROPHILS NFR BLD: 81.3 %
NEUTROPHILS NFR BLD: 86 %
NEUTROPHILS NFR BLD: 86 %
OVALOCYTES BLD QL SMEAR: ABNORMAL
PCO2 BLDA: 23.7 MMHG (ref 35–45)
PCO2 BLDA: 32.4 MMHG (ref 35–45)
PCO2 BLDA: 32.4 MMHG (ref 35–45)
PCO2 BLDA: 32.8 MMHG (ref 35–45)
PCO2 BLDA: 33.3 MMHG (ref 35–45)
PCO2 BLDA: 33.9 MMHG (ref 35–45)
PCO2 BLDA: 34.2 MMHG (ref 35–45)
PCO2 BLDA: 34.7 MMHG (ref 35–45)
PCO2 BLDA: 34.9 MMHG (ref 35–45)
PCO2 BLDA: 35 MMHG (ref 35–45)
PCO2 BLDA: 38.3 MMHG (ref 35–45)
PCO2 BLDA: 45.5 MMHG (ref 35–45)
PEEP: 5
PH SMN: 7.13 [PH] (ref 7.35–7.45)
PH SMN: 7.33 [PH] (ref 7.35–7.45)
PH SMN: 7.35 [PH] (ref 7.35–7.45)
PH SMN: 7.36 [PH] (ref 7.35–7.45)
PH SMN: 7.37 [PH] (ref 7.35–7.45)
PH SMN: 7.39 [PH] (ref 7.35–7.45)
PH SMN: 7.4 [PH] (ref 7.35–7.45)
PH SMN: 7.43 [PH] (ref 7.35–7.45)
PH SMN: 7.45 [PH] (ref 7.35–7.45)
PH SMN: 7.46 [PH] (ref 7.35–7.45)
PH SMN: 7.47 [PH] (ref 7.35–7.45)
PH SMN: 7.48 [PH] (ref 7.35–7.45)
PHOSPHATE SERPL-MCNC: 4.3 MG/DL
PHOSPHATE SERPL-MCNC: 5.1 MG/DL
PHOSPHATE SERPL-MCNC: 5.1 MG/DL
PIP: 20
PIP: 21
PIP: 21
PIP: 22
PIP: 24
PIP: 25
PIP: 26
PIP: 26
PLATELET # BLD AUTO: 275 K/UL
PLATELET # BLD AUTO: 289 K/UL
PLATELET # BLD AUTO: 298 K/UL
PLATELET BLD QL SMEAR: ABNORMAL
PMV BLD AUTO: 10.4 FL
PMV BLD AUTO: 10.4 FL
PMV BLD AUTO: 10.8 FL
PMV BLD AUTO: 11 FL
PMV BLD AUTO: 11.3 FL
PO2 BLDA: 103 MMHG (ref 80–100)
PO2 BLDA: 104 MMHG (ref 80–100)
PO2 BLDA: 121 MMHG (ref 80–100)
PO2 BLDA: 150 MMHG (ref 80–100)
PO2 BLDA: 151 MMHG (ref 80–100)
PO2 BLDA: 170 MMHG (ref 80–100)
PO2 BLDA: 200 MMHG (ref 80–100)
PO2 BLDA: 206 MMHG (ref 80–100)
PO2 BLDA: 323 MMHG (ref 80–100)
PO2 BLDA: 41 MMHG (ref 40–60)
PO2 BLDA: 82 MMHG (ref 80–100)
PO2 BLDA: 96 MMHG (ref 80–100)
POC BE: -1 MMOL/L
POC BE: -12 MMOL/L
POC BE: -14 MMOL/L
POC BE: -2 MMOL/L
POC BE: -3 MMOL/L
POC BE: -4 MMOL/L
POC BE: -5 MMOL/L
POC BE: -6 MMOL/L
POC BE: -8 MMOL/L
POC BE: 0 MMOL/L
POC BE: 0 MMOL/L
POC BE: 2 MMOL/L
POC IONIZED CALCIUM: 1.1 MMOL/L (ref 1.06–1.42)
POC SATURATED O2: 100 % (ref 95–100)
POC SATURATED O2: 74 % (ref 95–100)
POC SATURATED O2: 96 % (ref 95–100)
POC SATURATED O2: 98 % (ref 95–100)
POC SATURATED O2: 99 % (ref 95–100)
POC SATURATED O2: 99 % (ref 95–100)
POC TCO2: 14 MMOL/L (ref 23–27)
POC TCO2: 16 MMOL/L (ref 23–27)
POC TCO2: 19 MMOL/L (ref 23–27)
POC TCO2: 20 MMOL/L (ref 23–27)
POC TCO2: 21 MMOL/L (ref 23–27)
POC TCO2: 23 MMOL/L (ref 23–27)
POC TCO2: 23 MMOL/L (ref 23–27)
POC TCO2: 23 MMOL/L (ref 24–29)
POC TCO2: 24 MMOL/L (ref 23–27)
POC TCO2: 25 MMOL/L (ref 23–27)
POC TCO2: 25 MMOL/L (ref 23–27)
POC TCO2: 27 MMOL/L (ref 23–27)
POCT GLUCOSE: 120 MG/DL (ref 70–110)
POCT GLUCOSE: 132 MG/DL (ref 70–110)
POCT GLUCOSE: 136 MG/DL (ref 70–110)
POCT GLUCOSE: 81 MG/DL (ref 70–110)
POCT GLUCOSE: 93 MG/DL (ref 70–110)
POCT GLUCOSE: 97 MG/DL (ref 70–110)
POIKILOCYTOSIS BLD QL SMEAR: SLIGHT
POLYCHROMASIA BLD QL SMEAR: ABNORMAL
POTASSIUM BLD-SCNC: 4.1 MMOL/L (ref 3.5–5.1)
POTASSIUM SERPL-SCNC: 3.9 MMOL/L
POTASSIUM SERPL-SCNC: 4 MMOL/L
POTASSIUM SERPL-SCNC: 4 MMOL/L
POTASSIUM SERPL-SCNC: 4.1 MMOL/L
POTASSIUM SERPL-SCNC: 4.8 MMOL/L
PREALB SERPL-MCNC: 5 MG/DL
PROT SERPL-MCNC: 4.2 G/DL
PROT SERPL-MCNC: 5 G/DL
PROT SERPL-MCNC: 5.3 G/DL
PROT SERPL-MCNC: 5.4 G/DL
PROT SERPL-MCNC: 5.4 G/DL
PROT SERPL-MCNC: 5.5 G/DL
PROTHROMBIN TIME: 32.7 SEC
PROTHROMBIN TIME: 32.7 SEC
RBC # BLD AUTO: 3.06 M/UL
RBC # BLD AUTO: 3.06 M/UL
RBC # BLD AUTO: 3.34 M/UL
RBC # BLD AUTO: 3.38 M/UL
RBC # BLD AUTO: 3.6 M/UL
RETIRED EF AND QEF - SEE NOTES: 10 (ref 55–65)
SAMPLE: ABNORMAL
SITE: ABNORMAL
SODIUM BLD-SCNC: 133 MMOL/L (ref 136–145)
SODIUM SERPL-SCNC: 135 MMOL/L
SODIUM SERPL-SCNC: 142 MMOL/L
SODIUM SERPL-SCNC: 143 MMOL/L
SODIUM SERPL-SCNC: 144 MMOL/L
SODIUM SERPL-SCNC: 144 MMOL/L
SP02: 100
TARGETS BLD QL SMEAR: ABNORMAL
TRANS ERYTHROCYTES VOL PATIENT: NORMAL ML
TRICUSPID VALVE REGURGITATION: ABNORMAL
VT: 410
VT: 496
VT: 500
VT: 535
WBC # BLD AUTO: 12.39 K/UL
WBC # BLD AUTO: 12.39 K/UL
WBC # BLD AUTO: 4.89 K/UL
WBC # BLD AUTO: 5.32 K/UL
WBC # BLD AUTO: 6.98 K/UL

## 2017-08-09 PROCEDURE — 63600175 PHARM REV CODE 636 W HCPCS: Performed by: ANESTHESIOLOGY

## 2017-08-09 PROCEDURE — 25000003 PHARM REV CODE 250: Performed by: THORACIC SURGERY (CARDIOTHORACIC VASCULAR SURGERY)

## 2017-08-09 PROCEDURE — 82803 BLOOD GASES ANY COMBINATION: CPT

## 2017-08-09 PROCEDURE — A4216 STERILE WATER/SALINE, 10 ML: HCPCS | Performed by: THORACIC SURGERY (CARDIOTHORACIC VASCULAR SURGERY)

## 2017-08-09 PROCEDURE — 5A1945Z RESPIRATORY VENTILATION, 24-96 CONSECUTIVE HOURS: ICD-10-PCS | Performed by: INTERNAL MEDICINE

## 2017-08-09 PROCEDURE — 31500 INSERT EMERGENCY AIRWAY: CPT

## 2017-08-09 PROCEDURE — 80053 COMPREHEN METABOLIC PANEL: CPT | Mod: 91

## 2017-08-09 PROCEDURE — 25000003 PHARM REV CODE 250: Performed by: STUDENT IN AN ORGANIZED HEALTH CARE EDUCATION/TRAINING PROGRAM

## 2017-08-09 PROCEDURE — C9113 INJ PANTOPRAZOLE SODIUM, VIA: HCPCS | Performed by: STUDENT IN AN ORGANIZED HEALTH CARE EDUCATION/TRAINING PROGRAM

## 2017-08-09 PROCEDURE — 0BH17EZ INSERTION OF ENDOTRACHEAL AIRWAY INTO TRACHEA, VIA NATURAL OR ARTIFICIAL OPENING: ICD-10-PCS | Performed by: ANESTHESIOLOGY

## 2017-08-09 PROCEDURE — 83050 HGB METHEMOGLOBIN QUAN: CPT

## 2017-08-09 PROCEDURE — 80053 COMPREHEN METABOLIC PANEL: CPT

## 2017-08-09 PROCEDURE — 87186 SC STD MICRODIL/AGAR DIL: CPT

## 2017-08-09 PROCEDURE — 93750 INTERROGATION VAD IN PERSON: CPT | Performed by: THORACIC SURGERY (CARDIOTHORACIC VASCULAR SURGERY)

## 2017-08-09 PROCEDURE — 85014 HEMATOCRIT: CPT

## 2017-08-09 PROCEDURE — 80076 HEPATIC FUNCTION PANEL: CPT

## 2017-08-09 PROCEDURE — 31500 INSERT EMERGENCY AIRWAY: CPT | Performed by: ANESTHESIOLOGY

## 2017-08-09 PROCEDURE — P9045 ALBUMIN (HUMAN), 5%, 250 ML: HCPCS | Performed by: STUDENT IN AN ORGANIZED HEALTH CARE EDUCATION/TRAINING PROGRAM

## 2017-08-09 PROCEDURE — 83615 LACTATE (LD) (LDH) ENZYME: CPT

## 2017-08-09 PROCEDURE — P9021 RED BLOOD CELLS UNIT: HCPCS

## 2017-08-09 PROCEDURE — 36430 TRANSFUSION BLD/BLD COMPNT: CPT

## 2017-08-09 PROCEDURE — 93306 TTE W/DOPPLER COMPLETE: CPT

## 2017-08-09 PROCEDURE — 87070 CULTURE OTHR SPECIMN AEROBIC: CPT

## 2017-08-09 PROCEDURE — 27000221 HC OXYGEN, UP TO 24 HOURS

## 2017-08-09 PROCEDURE — 63600175 PHARM REV CODE 636 W HCPCS

## 2017-08-09 PROCEDURE — 99233 SBSQ HOSP IP/OBS HIGH 50: CPT | Mod: ,,, | Performed by: INTERNAL MEDICINE

## 2017-08-09 PROCEDURE — 27200188 HC TRANSDUCER, ART ADULT/PEDS

## 2017-08-09 PROCEDURE — C1751 CATH, INF, PER/CENT/MIDLINE: HCPCS

## 2017-08-09 PROCEDURE — 85610 PROTHROMBIN TIME: CPT

## 2017-08-09 PROCEDURE — 83735 ASSAY OF MAGNESIUM: CPT | Mod: 91

## 2017-08-09 PROCEDURE — 93306 TTE W/DOPPLER COMPLETE: CPT | Mod: 26,,, | Performed by: INTERNAL MEDICINE

## 2017-08-09 PROCEDURE — 20000000 HC ICU ROOM

## 2017-08-09 PROCEDURE — 87040 BLOOD CULTURE FOR BACTERIA: CPT

## 2017-08-09 PROCEDURE — 80048 BASIC METABOLIC PNL TOTAL CA: CPT

## 2017-08-09 PROCEDURE — 85025 COMPLETE CBC W/AUTO DIFF WBC: CPT

## 2017-08-09 PROCEDURE — 99900026 HC AIRWAY MAINTENANCE (STAT)

## 2017-08-09 PROCEDURE — 87086 URINE CULTURE/COLONY COUNT: CPT

## 2017-08-09 PROCEDURE — 93750 INTERROGATION VAD IN PERSON: CPT | Mod: ,,, | Performed by: THORACIC SURGERY (CARDIOTHORACIC VASCULAR SURGERY)

## 2017-08-09 PROCEDURE — 84134 ASSAY OF PREALBUMIN: CPT

## 2017-08-09 PROCEDURE — 27000248 HC VAD-ADDITIONAL DAY

## 2017-08-09 PROCEDURE — 84100 ASSAY OF PHOSPHORUS: CPT

## 2017-08-09 PROCEDURE — 63600175 PHARM REV CODE 636 W HCPCS: Performed by: THORACIC SURGERY (CARDIOTHORACIC VASCULAR SURGERY)

## 2017-08-09 PROCEDURE — 87077 CULTURE AEROBIC IDENTIFY: CPT

## 2017-08-09 PROCEDURE — 86901 BLOOD TYPING SEROLOGIC RH(D): CPT

## 2017-08-09 PROCEDURE — 83880 ASSAY OF NATRIURETIC PEPTIDE: CPT

## 2017-08-09 PROCEDURE — 86140 C-REACTIVE PROTEIN: CPT

## 2017-08-09 PROCEDURE — 83605 ASSAY OF LACTIC ACID: CPT

## 2017-08-09 PROCEDURE — 36556 INSERT NON-TUNNEL CV CATH: CPT

## 2017-08-09 PROCEDURE — 37799 UNLISTED PX VASCULAR SURGERY: CPT

## 2017-08-09 PROCEDURE — 63600175 PHARM REV CODE 636 W HCPCS: Performed by: STUDENT IN AN ORGANIZED HEALTH CARE EDUCATION/TRAINING PROGRAM

## 2017-08-09 PROCEDURE — 86920 COMPATIBILITY TEST SPIN: CPT

## 2017-08-09 PROCEDURE — 25000003 PHARM REV CODE 250

## 2017-08-09 PROCEDURE — 84132 ASSAY OF SERUM POTASSIUM: CPT

## 2017-08-09 PROCEDURE — 36620 INSERTION CATHETER ARTERY: CPT

## 2017-08-09 PROCEDURE — 94761 N-INVAS EAR/PLS OXIMETRY MLT: CPT

## 2017-08-09 PROCEDURE — 97803 MED NUTRITION INDIV SUBSEQ: CPT

## 2017-08-09 PROCEDURE — 85730 THROMBOPLASTIN TIME PARTIAL: CPT

## 2017-08-09 PROCEDURE — 84295 ASSAY OF SERUM SODIUM: CPT

## 2017-08-09 PROCEDURE — 63600367 HC NITRIC OXIDE PER HOUR

## 2017-08-09 PROCEDURE — 94002 VENT MGMT INPAT INIT DAY: CPT

## 2017-08-09 PROCEDURE — 82330 ASSAY OF CALCIUM: CPT

## 2017-08-09 PROCEDURE — 94003 VENT MGMT INPAT SUBQ DAY: CPT

## 2017-08-09 PROCEDURE — 99233 SBSQ HOSP IP/OBS HIGH 50: CPT | Mod: 24,,, | Performed by: THORACIC SURGERY (CARDIOTHORACIC VASCULAR SURGERY)

## 2017-08-09 PROCEDURE — 87205 SMEAR GRAM STAIN: CPT

## 2017-08-09 PROCEDURE — 86900 BLOOD TYPING SEROLOGIC ABO: CPT

## 2017-08-09 PROCEDURE — A4216 STERILE WATER/SALINE, 10 ML: HCPCS | Performed by: STUDENT IN AN ORGANIZED HEALTH CARE EDUCATION/TRAINING PROGRAM

## 2017-08-09 PROCEDURE — 25000003 PHARM REV CODE 250: Performed by: ANESTHESIOLOGY

## 2017-08-09 PROCEDURE — 83735 ASSAY OF MAGNESIUM: CPT

## 2017-08-09 PROCEDURE — P9045 ALBUMIN (HUMAN), 5%, 250 ML: HCPCS | Performed by: THORACIC SURGERY (CARDIOTHORACIC VASCULAR SURGERY)

## 2017-08-09 PROCEDURE — 36600 WITHDRAWAL OF ARTERIAL BLOOD: CPT

## 2017-08-09 PROCEDURE — 84100 ASSAY OF PHOSPHORUS: CPT | Mod: 91

## 2017-08-09 RX ORDER — FENTANYL CITRATE 50 UG/ML
INJECTION, SOLUTION INTRAMUSCULAR; INTRAVENOUS
Status: COMPLETED
Start: 2017-08-09 | End: 2017-08-09

## 2017-08-09 RX ORDER — METHYLPREDNISOLONE SOD SUCC 125 MG
100 VIAL (EA) INJECTION ONCE
Status: COMPLETED | OUTPATIENT
Start: 2017-08-09 | End: 2017-08-09

## 2017-08-09 RX ORDER — SODIUM BICARBONATE 1 MEQ/ML
50 SYRINGE (ML) INTRAVENOUS ONCE
Status: COMPLETED | OUTPATIENT
Start: 2017-08-09 | End: 2017-08-09

## 2017-08-09 RX ORDER — HYDROCODONE BITARTRATE AND ACETAMINOPHEN 500; 5 MG/1; MG/1
TABLET ORAL
Status: DISCONTINUED | OUTPATIENT
Start: 2017-08-09 | End: 2017-08-14

## 2017-08-09 RX ORDER — FLUCONAZOLE 2 MG/ML
400 INJECTION, SOLUTION INTRAVENOUS
Status: DISCONTINUED | OUTPATIENT
Start: 2017-08-09 | End: 2017-08-11

## 2017-08-09 RX ORDER — PROPOFOL 10 MG/ML
10 INJECTION, EMULSION INTRAVENOUS CONTINUOUS
Status: DISCONTINUED | OUTPATIENT
Start: 2017-08-09 | End: 2017-08-14

## 2017-08-09 RX ORDER — VASOPRESSIN 20 [USP'U]/ML
INJECTION, SOLUTION INTRAMUSCULAR; SUBCUTANEOUS
Status: DISPENSED
Start: 2017-08-09 | End: 2017-08-09

## 2017-08-09 RX ORDER — INSULIN ASPART 100 [IU]/ML
0-5 INJECTION, SOLUTION INTRAVENOUS; SUBCUTANEOUS EVERY 4 HOURS PRN
Status: DISCONTINUED | OUTPATIENT
Start: 2017-08-09 | End: 2017-09-22 | Stop reason: HOSPADM

## 2017-08-09 RX ORDER — NOREPINEPHRINE BITARTRATE/D5W 4MG/250ML
0.02 PLASTIC BAG, INJECTION (ML) INTRAVENOUS CONTINUOUS
Status: DISCONTINUED | OUTPATIENT
Start: 2017-08-09 | End: 2017-08-11

## 2017-08-09 RX ORDER — SIMETHICONE 80 MG
1 TABLET,CHEWABLE ORAL 3 TIMES DAILY PRN
Status: DISCONTINUED | OUTPATIENT
Start: 2017-08-09 | End: 2017-08-09

## 2017-08-09 RX ORDER — DOPAMINE HYDROCHLORIDE 160 MG/100ML
INJECTION, SOLUTION INTRAVENOUS
Status: COMPLETED
Start: 2017-08-09 | End: 2017-08-09

## 2017-08-09 RX ORDER — FENTANYL CITRATE 50 UG/ML
50 INJECTION, SOLUTION INTRAMUSCULAR; INTRAVENOUS ONCE
Status: COMPLETED | OUTPATIENT
Start: 2017-08-09 | End: 2017-08-09

## 2017-08-09 RX ORDER — EPINEPHRINE 1 MG/ML
INJECTION, SOLUTION INTRACARDIAC; INTRAMUSCULAR; INTRAVENOUS; SUBCUTANEOUS
Status: DISPENSED
Start: 2017-08-09 | End: 2017-08-09

## 2017-08-09 RX ORDER — CEFEPIME HYDROCHLORIDE 1 G/50ML
1 INJECTION, SOLUTION INTRAVENOUS
Status: DISCONTINUED | OUTPATIENT
Start: 2017-08-09 | End: 2017-08-12

## 2017-08-09 RX ORDER — DOPAMINE HCL IN DEXTROSE 5 % 400MG/.25L
3 INFUSION BOTTLE (ML) INTRAVENOUS CONTINUOUS
Status: DISCONTINUED | OUTPATIENT
Start: 2017-08-09 | End: 2017-08-21

## 2017-08-09 RX ORDER — SODIUM BICARBONATE 1 MEQ/ML
100 SYRINGE (ML) INTRAVENOUS ONCE
Status: COMPLETED | OUTPATIENT
Start: 2017-08-09 | End: 2017-08-09

## 2017-08-09 RX ORDER — SODIUM BICARBONATE 1 MEQ/ML
SYRINGE (ML) INTRAVENOUS
Status: COMPLETED
Start: 2017-08-09 | End: 2017-08-09

## 2017-08-09 RX ORDER — PROPOFOL 10 MG/ML
INJECTION, EMULSION INTRAVENOUS
Status: COMPLETED
Start: 2017-08-09 | End: 2017-08-09

## 2017-08-09 RX ORDER — LIDOCAINE HYDROCHLORIDE 10 MG/ML
INJECTION INFILTRATION; PERINEURAL
Status: COMPLETED
Start: 2017-08-09 | End: 2017-08-09

## 2017-08-09 RX ORDER — SUCCINYLCHOLINE CHLORIDE 20 MG/ML
INJECTION INTRAMUSCULAR; INTRAVENOUS
Status: COMPLETED
Start: 2017-08-09 | End: 2017-08-09

## 2017-08-09 RX ORDER — LIDOCAINE HYDROCHLORIDE 10 MG/ML
1 INJECTION INFILTRATION; PERINEURAL ONCE
Status: COMPLETED | OUTPATIENT
Start: 2017-08-09 | End: 2017-08-09

## 2017-08-09 RX ORDER — ROCURONIUM BROMIDE 10 MG/ML
INJECTION, SOLUTION INTRAVENOUS
Status: DISPENSED
Start: 2017-08-09 | End: 2017-08-09

## 2017-08-09 RX ORDER — ALBUMIN HUMAN 50 G/1000ML
25 SOLUTION INTRAVENOUS ONCE
Status: COMPLETED | OUTPATIENT
Start: 2017-08-09 | End: 2017-08-09

## 2017-08-09 RX ORDER — ETOMIDATE 2 MG/ML
INJECTION INTRAVENOUS
Status: COMPLETED
Start: 2017-08-09 | End: 2017-08-09

## 2017-08-09 RX ORDER — GLUCAGON 1 MG
1 KIT INJECTION
Status: DISCONTINUED | OUTPATIENT
Start: 2017-08-09 | End: 2017-09-22 | Stop reason: HOSPADM

## 2017-08-09 RX ADMIN — FENTANYL CITRATE 50 MCG: 50 INJECTION, SOLUTION INTRAMUSCULAR; INTRAVENOUS at 05:08

## 2017-08-09 RX ADMIN — CALCIUM GLUCONATE: 94 INJECTION, SOLUTION INTRAVENOUS at 09:08

## 2017-08-09 RX ADMIN — SODIUM BICARBONATE 50 MEQ: 84 INJECTION, SOLUTION INTRAVENOUS at 07:08

## 2017-08-09 RX ADMIN — Medication 10 ML: at 05:08

## 2017-08-09 RX ADMIN — METHYLPREDNISOLONE SODIUM SUCCINATE 100 MG: 125 INJECTION, POWDER, FOR SOLUTION INTRAMUSCULAR; INTRAVENOUS at 04:08

## 2017-08-09 RX ADMIN — PROPOFOL 5 MCG/KG/MIN: 10 INJECTION, EMULSION INTRAVENOUS at 04:08

## 2017-08-09 RX ADMIN — SIMETHICONE CHEW TAB 80 MG 80 MG: 80 TABLET ORAL at 01:08

## 2017-08-09 RX ADMIN — DEXTROSE AND SODIUM CHLORIDE: 5; .9 INJECTION, SOLUTION INTRAVENOUS at 01:08

## 2017-08-09 RX ADMIN — Medication 100 MEQ: at 03:08

## 2017-08-09 RX ADMIN — ALBUMIN (HUMAN) 500 ML: 12.5 SOLUTION INTRAVENOUS at 07:08

## 2017-08-09 RX ADMIN — PROPOFOL 10 MCG/KG/MIN: 10 INJECTION, EMULSION INTRAVENOUS at 05:08

## 2017-08-09 RX ADMIN — Medication 10 ML: at 01:08

## 2017-08-09 RX ADMIN — FLUCONAZOLE 400 MG: 2 INJECTION INTRAVENOUS at 09:08

## 2017-08-09 RX ADMIN — DEXTROSE 8 MG/HR: 50 INJECTION, SOLUTION INTRAVENOUS at 05:08

## 2017-08-09 RX ADMIN — VASOPRESSIN 0.08 UNITS/MIN: 20 INJECTION INTRAVENOUS at 04:08

## 2017-08-09 RX ADMIN — CEFEPIME HYDROCHLORIDE 1 G: 1 INJECTION, SOLUTION INTRAVENOUS at 08:08

## 2017-08-09 RX ADMIN — SODIUM BICARBONATE 100 MEQ: 84 INJECTION, SOLUTION INTRAVENOUS at 05:08

## 2017-08-09 RX ADMIN — CEFEPIME HYDROCHLORIDE 1 G: 1 INJECTION, SOLUTION INTRAVENOUS at 07:08

## 2017-08-09 RX ADMIN — Medication 50 MEQ: at 08:08

## 2017-08-09 RX ADMIN — EPINEPHRINE 0.06 MCG/KG/MIN: 1 INJECTION PARENTERAL at 10:08

## 2017-08-09 RX ADMIN — LIDOCAINE HYDROCHLORIDE 1 ML: 10 INJECTION INFILTRATION; PERINEURAL at 04:08

## 2017-08-09 RX ADMIN — Medication 3 ML: at 06:08

## 2017-08-09 RX ADMIN — SODIUM BICARBONATE 100 MEQ: 84 INJECTION, SOLUTION INTRAVENOUS at 03:08

## 2017-08-09 RX ADMIN — FENTANYL CITRATE 50 MCG: 50 INJECTION INTRAMUSCULAR; INTRAVENOUS at 05:08

## 2017-08-09 RX ADMIN — Medication 50 MEQ: at 07:08

## 2017-08-09 RX ADMIN — Medication 10 ML: at 11:08

## 2017-08-09 RX ADMIN — DEXTROSE 8 MG/HR: 50 INJECTION, SOLUTION INTRAVENOUS at 10:08

## 2017-08-09 RX ADMIN — ALBUMIN (HUMAN) 500 ML: 12.5 SOLUTION INTRAVENOUS at 04:08

## 2017-08-09 RX ADMIN — DOBUTAMINE HYDROCHLORIDE 5 MCG/KG/MIN: 400 INJECTION INTRAVENOUS at 01:08

## 2017-08-09 RX ADMIN — DEXTROSE 8 MG/HR: 50 INJECTION, SOLUTION INTRAVENOUS at 09:08

## 2017-08-09 RX ADMIN — PROPOFOL 40 MCG/KG/MIN: 10 INJECTION, EMULSION INTRAVENOUS at 01:08

## 2017-08-09 RX ADMIN — Medication 3 ML: at 10:08

## 2017-08-09 RX ADMIN — DOPAMINE HYDROCHLORIDE IN DEXTROSE 3 MCG/KG/MIN: 1.6 INJECTION, SOLUTION INTRAVENOUS at 04:08

## 2017-08-09 RX ADMIN — Medication 0.2 MCG/KG/MIN: at 05:08

## 2017-08-09 RX ADMIN — Medication 100 MEQ: at 05:08

## 2017-08-09 RX ADMIN — ALBUMIN (HUMAN) 25 G: 12.5 SOLUTION INTRAVENOUS at 08:08

## 2017-08-09 RX ADMIN — SODIUM BICARBONATE 50 MEQ: 84 INJECTION, SOLUTION INTRAVENOUS at 08:08

## 2017-08-09 RX ADMIN — Medication 0.04 MCG/KG/MIN: at 10:08

## 2017-08-09 RX ADMIN — DOPAMINE HYDROCHLORIDE IN DEXTROSE 5 MCG/KG/MIN: 1.6 INJECTION, SOLUTION INTRAVENOUS at 05:08

## 2017-08-09 RX ADMIN — SUCCINYLCHOLINE CHLORIDE 10 MG: 20 INJECTION, SOLUTION INTRAMUSCULAR; INTRAVENOUS at 03:08

## 2017-08-09 RX ADMIN — Medication 10 ML: at 06:08

## 2017-08-09 RX ADMIN — ALBUMIN (HUMAN) 25 G: 12.5 SOLUTION INTRAVENOUS at 11:08

## 2017-08-09 RX ADMIN — ALBUMIN (HUMAN) 25 G: 12.5 SOLUTION INTRAVENOUS at 07:08

## 2017-08-09 RX ADMIN — PROPOFOL 35 MCG/KG/MIN: 10 INJECTION, EMULSION INTRAVENOUS at 05:08

## 2017-08-09 RX ADMIN — ETOMIDATE 20 MG: 2 INJECTION, SOLUTION INTRAVENOUS at 03:08

## 2017-08-09 RX ADMIN — LIDOCAINE HYDROCHLORIDE 1 ML: 10 INJECTION, SOLUTION INFILTRATION; PERINEURAL at 04:08

## 2017-08-09 RX ADMIN — EPINEPHRINE 0.02 MCG/KG/MIN: 1 INJECTION PARENTERAL at 04:08

## 2017-08-09 RX ADMIN — PROPOFOL 40 MCG/KG/MIN: 10 INJECTION, EMULSION INTRAVENOUS at 08:08

## 2017-08-09 RX ADMIN — Medication 3 ML: at 02:08

## 2017-08-09 NOTE — PROGRESS NOTES
[facility]  Heart Transplant  Progress Note    Patient Name: Suman Hayden  MRN: [id number]  Admission Date: 7/18/2017  Hospital Length of Stay: 22 days  Attending Physician: Sunny Downing MD  Primary Care Provider: Joe Ernst MD  Principal Problem:Acute on chronic combined systolic and diastolic heart failure    Subjective:     Interval History: Events from overnight noted- pt transferred to unit for SOB and hypotension. Was intubated, started on multiple pressors, NGT placed with ~2L output     Continuous Infusions:   amiodarone Stopped (08/08/17 1500)    dextrose 5 % and 0.9 % NaCl 80 mL/hr at 08/09/17 0136    DOBUTamine 5 mcg/kg/min (08/09/17 0126)    DOPamine 5 mcg/kg/min (08/09/17 1500)    epinephrine infusion 0.04 mcg/kg/min (08/09/17 1500)    furosemide (LASIX) 1 mg/mL infusion (non-titrating) Stopped (08/09/17 0000)    nitric oxide gas      norepinephrine bitartrate-D5W Stopped (08/09/17 1200)    pantoprazole 40 mg in dextrose 5 % 100 mL infusion (ready to mix system) 8 mg/hr (08/09/17 1500)    propofol 35 mcg/kg/min (08/09/17 1500)    TPN ADULT CENTRAL LINE CUSTOM      vasopressin (PITRESSIN) infusion 0.04 Units/min (08/09/17 1500)     Scheduled Meds:   amiodarone  400 mg Oral BID    amlodipine  5 mg Oral Daily    ceFEPime (MAXIPIME) IVPB  1 g Intravenous Q12H    EPINEPHrine        fluconazole (DIFLUCAN) IVPB  400 mg Intravenous Q24H    rocuronium        sodium chloride 0.9%  10 mL Intravenous Q6H    sodium chloride 0.9%  3 mL Intravenous Q8H    vasopressin         PRN Meds:sodium chloride, acetaminophen, albumin human 5%, albuterol sulfate, bisacodyl, dextrose 50%, fentaNYL, glucagon (human recombinant), hydrALAZINE, insulin aspart, magnesium hydroxide 400 mg/5 ml, magnesium sulfate IVPB, ondansetron, potassium chloride, potassium chloride, simethicone, Flushing PICC Protocol **AND** sodium chloride 0.9% **AND** sodium chloride 0.9%, sodium phosphate IVPB, sodium  phosphate IVPB, sodium phosphate IVPB    Review of patient's allergies indicates:  No Known Allergies  Objective:     Vital Signs (Most Recent):  Temp: 98.2 °F (36.8 °C) (08/09/17 1100)  Pulse: 80 (08/09/17 1500)  Resp: 17 (08/09/17 1500)  BP: (!) 64/0 (08/09/17 1500)  SpO2: 100 % (08/09/17 1500) Vital Signs (24h Range):  Temp:  [97.4 °F (36.3 °C)-98.3 °F (36.8 °C)] 98.2 °F (36.8 °C)  Pulse:  [] 80  Resp:  [12-41] 17  SpO2:  [91 %-100 %] 100 %  BP: ()/(0-59) 64/0  Arterial Line BP: (36-77)/(29-62) 67/56     Weight: 86.7 kg (191 lb 2.2 oz)  Body mass index is 29.06 kg/m².      Intake/Output Summary (Last 24 hours) at 08/09/17 1526  Last data filed at 08/09/17 1500   Gross per 24 hour   Intake             6413 ml   Output             2770 ml   Net             3643 ml       Hemodynamic Parameters:         Physical Exam   Constitutional: He is oriented to person, place, and time. He appears well-developed and well-nourished. No distress. He is sedated and intubated.   HENT:   Head: Normocephalic and atraumatic.   Eyes: EOM are normal. Pupils are equal, round, and reactive to light.   Neck: Normal range of motion. Neck supple. No JVD present.   Cardiovascular: Normal rate and regular rhythm.  Exam reveals no gallop and no friction rub.    No murmur heard.  + LVAD hum    Pulmonary/Chest: Effort normal and breath sounds normal. He is intubated. No respiratory distress. He has no wheezes. He has no rales.   Abdominal: Soft. He exhibits no distension. There is no tenderness. There is no guarding.   Musculoskeletal: Normal range of motion. He exhibits no edema.   Neurological: He is oriented to person, place, and time. No cranial nerve deficit. Coordination normal.   Skin: Skin is warm and dry. He is not diaphoretic. No erythema.   Nursing note and vitals reviewed.      Significant Labs:  CBC:    Recent Labs  Lab 08/09/17  1159   WBC 4.89   RBC 3.34*   HGB 9.2*   HCT 26.6*      MCV 80*   MCH 27.5   MCHC  34.6     BNP:    Recent Labs  Lab 08/09/17  0349   BNP 1,232*  1,232*     CMP:    Recent Labs  Lab 08/09/17  1159   *   CALCIUM 7.6*   ALBUMIN 3.2*   PROT 5.4*      K 3.9   CO2 22*      BUN 53*   CREATININE 2.2*   ALKPHOS 50*   ALT 89*   *   BILITOT 2.4*      Coagulation:     Recent Labs  Lab 08/09/17  0349   INR 3.3*  3.3*   APTT 38.2*  38.2*     LDH:    Recent Labs  Lab 08/07/17  0418 08/08/17  0512 08/09/17  0349    210 335*     Microbiology:  Microbiology Results (last 7 days)     Procedure Component Value Units Date/Time    Culture, Respiratory with Gram Stain [[id number]] Collected:  08/09/17 0723    Order Status:  Completed Specimen:  Respiratory from Endotracheal Aspirate Updated:  08/09/17 1252     Gram Stain (Respiratory) <10 epithelial cells per low power field.     Gram Stain (Respiratory) Few WBC's     Gram Stain (Respiratory) Many Gram negative rods     Gram Stain (Respiratory) Few Gram positive rods     Gram Stain (Respiratory) Few Gram positive cocci    Urine culture [[id number]] Collected:  08/09/17 0725    Order Status:  Sent Specimen:  Urine from Urine, Catheterized Updated:  08/09/17 0926    Blood culture [[id number]] Collected:  08/09/17 0733    Order Status:  Sent Specimen:  Blood from Peripheral, Antecubital, Left Updated:  08/09/17 0859    Blood culture [[id number]] Collected:  08/09/17 0813    Order Status:  Sent Specimen:  Blood from Line, Arterial, Right Updated:  08/09/17 0858    Blood culture [[id number]] Collected:  08/04/17 1653    Order Status:  Completed Specimen:  Blood from Peripheral, Wrist, Right Updated:  08/08/17 2012     Blood Culture, Routine No Growth to date     Blood Culture, Routine No Growth to date     Blood Culture, Routine No Growth to date     Blood Culture, Routine No Growth to date     Blood Culture, Routine No Growth to date    Blood culture [[id number]] Collected:  08/04/17 1654    Order Status:  Completed Specimen:  Blood from  Peripheral, Wrist, Left Updated:  08/08/17 2012     Blood Culture, Routine No Growth to date     Blood Culture, Routine No Growth to date     Blood Culture, Routine No Growth to date     Blood Culture, Routine No Growth to date     Blood Culture, Routine No Growth to date    Urine culture [[id number]] Collected:  08/04/17 1655    Order Status:  Completed Specimen:  Urine from Urine, Catheterized Updated:  08/05/17 2116     Urine Culture, Routine No growth          I have reviewed all pertinent labs within the past 24 hours.    Estimated Creatinine Clearance: 34.9 mL/min (based on Cr of 2.2).    Diagnostic Results:  I have reviewed and interpreted all pertinent imaging results/findings within the past 24 hours.    Assessment and Plan:     LVAD (left ventricular assist device) present    - LVAD HM III. Placed this admit 7/27/17  - CTS Primary  - chest closure (7/28/17) and extubated (7/29/17)  - Speed 5200  - . Monitor daily  - INR 3.3 off coumadin   -Pt with acute SOB and hypotension overnight- now intubated. On , Dopamine, Epi, Levo, Vaso, NO. SVO2 74. Calculated SVR 800s on above pressors. Recommned starting to wean inotropes and continuing Epi, Levo. Also recommend CT ABd/pelvis to r/o bleed as cause of Hypotension. Cxs also done this am-- cont Vanc, Cefepime. Rec adding Vanc  -CVP 9-13 overnight. Now off Lasix and getting IVFs, albumin  -Echo this morning did not show pericardial effusion                  Atrial fibrillation    - c/w ac  - c/w amio gtt        Urine culture positive    - afebrile, treated with IV cefipime         Atrial tachycardia    - [id number] - 3  - c/w amiodarone  - c/w heparin gtt        AICD discharge    - appropriate in the setting of VT aggravated by underlying AT/AFL (earlier during the admission)  - 7/28 - setting of AF undersense - device reprogrammed to VVI 80  - tachy therapy turned off  - Was on amio gtt. Now on PO Amio  - EP planning to do lead revision once INR  theraputic         Hepatitis B core antibody positive since 2012    - will defer liver biopsy as CTS not requiring it  - ID consult cleared the patient for sx  - case discussed with hepatology, low suspicion for liver involvement        V-tach    - s/p amiodarone reload - now on amio gtt        Hyperlipidemia    - c/w pravastatin            Susannah Elizabeth PA-C  Heart Transplant  [facility]

## 2017-08-09 NOTE — SUBJECTIVE & OBJECTIVE
Interval History: patient intubated overnight. upon transfer to floor he complained of significant abdominal pain, was transferred back to SICU, became non-responsive, was intubated, and they proceeded to drain approx 2L gastric content which, this morning, looks blood tinged     Review of Systems   Unable to perform ROS: intubated   Constitution: Positive for decreased appetite.     Objective:     Vital Signs (Most Recent):  Temp: 98.3 °F (36.8 °C) (08/09/17 0715)  Pulse: 80 (08/09/17 1015)  Resp: (!) 21 (08/09/17 1015)  BP: (!) 60/0 (08/09/17 0900)  SpO2: 100 % (08/09/17 1015) Vital Signs (24h Range):  Temp:  [97.4 °F (36.3 °C)-98.3 °F (36.8 °C)] 98.3 °F (36.8 °C)  Pulse:  [] 80  Resp:  [12-41] 21  SpO2:  [91 %-100 %] 100 %  BP: ()/(0-59) 60/0  Arterial Line BP: ()/(29-86) 72/59     Weight: 86.7 kg (191 lb 2.2 oz)  Body mass index is 29.06 kg/m².     SpO2: 100 %  O2 Device (Oxygen Therapy): ventilator    Physical Exam   Constitutional: He is oriented to person, place, and time. He appears well-developed and well-nourished.   HENT:   Head: Normocephalic and atraumatic.   Eyes: Conjunctivae and EOM are normal. Pupils are equal, round, and reactive to light.   Neck: Normal range of motion. Neck supple. No JVD present.   Cardiovascular:   Smooth vad sounds     Pulmonary/Chest: Effort normal and breath sounds normal. No respiratory distress. He has no wheezes. He has no rales. He exhibits no tenderness.   Abdominal: Soft. Bowel sounds are normal. He exhibits distension. There is tenderness.   diffuse   Musculoskeletal: Normal range of motion. He exhibits no edema or tenderness.   Neurological: He is alert and oriented to person, place, and time.   Skin: Skin is warm and dry. No erythema. No pallor.       Significant Labs: EP:   Recent Labs  Lab 08/08/17  0512  08/08/17  2134  08/09/17  0349 08/09/17  0807     < > 136  --  135*  143  143 144   K 3.6  < > 4.0  --  4.8  4.0  4.0 3.9     <  > 103  --  106  103  103 105   CO2 22*  < > 20*  --  13*  21*  21* 20*   GLU 93  < > 110  --  89  83  83 125*   BUN 47*  < > 50*  --  51*  52*  52* 51*   CREATININE 1.7*  < > 2.1*  --  2.4*  2.3*  2.3* 2.3*   CALCIUM 8.2*  < > 8.7  --  7.7*  7.3*  7.3* 7.8*   PROT  --   --   --   --  5.0*  4.2* 5.4*   ALBUMIN  --   --   --   --  2.2*  1.9* 3.0*   BILITOT  --   --   --   --  1.6*  1.5* 2.2*   ALKPHOS  --   --   --   --  68  56 57   AST  --   --   --   --  93*  93* 271*   ALT  --   --   --   --  35  34 92*   ANIONGAP 12  < > 13  --  16  19*  19* 19*   ESTGFRAFRICA 47.2*  < > 36.6*  --  31.1*  32.7*  32.7* 32.7*   EGFRNONAA 40.8*  < > 31.6*  --  26.9*  28.3*  28.3* 28.3*   WBC 13.01*  --   --   --  12.39  12.39 5.32   HGB 8.5*  --   --   --  8.4*  8.4* 9.7*   HCT 25.3*  --   --   < > 24.5*  24.5* 28.8*     --   --   --  298  298 298   INR 2.5*  --   --   --  3.3*  3.3*  --    < > = values in this interval not displayed.    Significant Imaging: Echocardiogram:   2D echo with color flow doppler:   Results for orders placed or performed during the hospital encounter of 07/18/17   2D echo with color flow doppler   Result Value Ref Range    EF 10 (A) 55 - 65    Mitral Valve Regurgitation MILD     Diastolic Dysfunction Yes (A)     Est. PA Systolic Pressure 25.81     Mitral Valve Mobility NORMAL

## 2017-08-09 NOTE — PROGRESS NOTES
08/08/17 2315   Vital Signs   BP (!) 58/0   BP Location Left arm   BP Method Doppler   Patient Position Lying   Dr. Espinoza notified of above doppler and CVP 9 at present.  Also PI 1.8 and has been trending down.  LVAD coordinator on call, Liz Rust,  also notified of above.  Also notified of INR 2.5 and heparin infusion ordered to be discontinued per Dr. Espinoza.  Will continue to monitor.

## 2017-08-09 NOTE — ASSESSMENT & PLAN NOTE
-pt reintubated overnight for airway protection.  -Per staff, when patient is stable, will likely switch the RV and LV ports as he does not need to BiV pace but simply sense for VT/VF protection   -ICD is off but patient has pads to chest wall in case it is needed  -has frequent afib with PVCs at his baseline and this continues  -Has LVAD (HMIII) in place at this time  -procedure will likely be done once gi issues resolve so dependant upon patient's gi function  -please reconsult when gi issues resolve and patient is more clinically stable (extubated and HD stable)  -feel free to call us with any questions.  We thank you for allowing us to be a part of Mr Hayden's care.

## 2017-08-09 NOTE — ANESTHESIA PROCEDURE NOTES
Intubation    Diagnosis: Hypoxic respiratory failure  Procedure start time: 8/9/2017 4:02 AM  Timeout: 8/9/2017 4:00 AM  Procedure end time: 8/9/2017 4:04 AM  Staffing  Anesthesiologist: MIKAEL HOGUE  Resident/CRNA: CED CAIN  Performed: resident/CRNA   Anesthesiologist was present at the time of the procedure.  Preanesthetic Checklist  Completed: patient identified, site marked, surgical consent, pre-op evaluation, timeout performed, IV checked, risks and benefits discussed, monitors and equipment checked and anesthesia consent given  Intubation  Indication: respiratory failure, hypoxemia, airway protection  Pre-oxygenation. Induction: intravenous, mask ventilation: easy mask.  Intubation: postinduction, laryngoscopy glidescope, Shahzad 3.  Endotracheal Tube: oral, 8.0 mm ID, cuffed (inflated to minimal occlusive pressure)  Attempts: 2, Grade I - full view of cords  Complicating Factors: none  Tube secured at 25 cm at the lips.  Findings post-intubation: bilateral breath sounds, positive ETCO2, atraumatic / condition of teeth unchanged  Position Confirmation: auscultation

## 2017-08-09 NOTE — PROGRESS NOTES
Dr. Downing updated on most recent ABG, all gtt rates, all VS, and output. Received order for another 50 mEq Sodium Bicarbonate, additional 500cc 5% albumin over 2-3hours. Also received order for CBC and CMP q6hr. See flow sheet for all orders received and implemented. Will continue to monitor.

## 2017-08-09 NOTE — PROGRESS NOTES
Dr. Downing at bedside. Updated on patient current condition, all gtt rates, VS, output, and recent labs. Also notified Dr. Downing that SvO2 drawn and SvO2 74%.  Received order to wean Epi from 0.08 mcg/kg/min to 0.06 mcg/kg/min and leave Epi at 0.06 mcg/kg/min. Plan is to wean Levo and Vaso infusions slowly to Vaso 0.04 Units/min and Levo 0.04 mcg/kg/min by end of shift. Also received order to hold all PO medications as well aspirin suppository. See flowsheet for all orders received and implemented.

## 2017-08-09 NOTE — PROGRESS NOTES
Pt.was electively intubated placed on vent settings as ordered abg was done  is aware of abg results,fio2 was weaned as tolerated with adequate sats.

## 2017-08-09 NOTE — PLAN OF CARE
End of shift note:  At 03:15 patient was transferred to SICU from CSU, pulled onto ICU bed, continuous telemetry and oximetry ongoing, Dobutamine @ 5 mcg/kg/min. Wife at bedside, Dr. Espinoza at bedside. Neuro intact, AAO x3, c/o abdominal pain with obvious labored breathing on 4L NC. Left arm doppler pressure 40, patient is not pulsatile. Repeat doppler pressure 40. Set-up for left radial arterial line started, while nursing attempted right nare NGT placement during which patient vomited x2, thin, reddish,brown, foul smelling emesis. Dr. Vazquez as now also arrived at the bedside, and the patient's wife was brought to the conference room with the  for support. Patient then immediately became obtunded after vomiting, requiring 100% NRB. Anesthesia called to bedside for intubation. Levophed started, NS bolus in progress. 1u PRBC ordered emergently as type and screen  (blood was not delivered until 0440). ETT obtained at 0345.   Patient then required addition of Epi gtt and Vaso gtt along with multiple doses of Sodium Bicarbonate totaling 6 amps since transfer. Also received Albumin 5% 500ml, and a total of 2L NS bolus.   While Blade Coy and Adam attempted a second right radial luca, patient required Propofol gtt for sedation. Bedside echo was then performed by Blade Coy & Adam during which they also pushed additional doses of etomidate for sedation.  At 0440 Dr. Espinoza was able to obtain a RIJ Trialysis. Dr. Downing on phone and aware of patient's condition and that patient remains anuric. Nitric started at 0450.   Patient LVAD speed as 5200 upon admit, then changed to 4000 @ 0423, and then titrated back up by Dr. Downing at the bedside, speed is now at 5000.   Patient with a total of 2300 ml OGT output, reddish brown color. Abdomen is noticeably softer.   Orders received to continue to wean pressors as tolerated for a MAP >55.   Patient's wife is at the bedside, and speaking with Dr. Downing re:  prognosis, and plan of care.

## 2017-08-09 NOTE — PROGRESS NOTES
Dr. Downing at bedside, updated on patient current condition, all gtt rates, VS, and output. Notified Dr. Downing that Vaso weaned to 0.04 Units/min. Received order to decrease Levo from 0.05 mcg/kg/min to 0.04 mcg/kg/min. Plan is to continue to wean levo to off throughout shift. Also received order to given another 500cc 5% albumin after the albumin infusing now is completed but give over 5 hours. Nitric also weaned from 10 ppm to 5 ppm per Dr. Downing, received order for ABG to be monitored q2, next one due at 1100. See flowsheet for all orders received and implemented.

## 2017-08-09 NOTE — PT/OT/SLP PROGRESS
Occupational Therapy      Suman Hayden  MRN: [id number]    Patient not seen today secondary to (pt on hold 2nd to being on multiple pressors and being medically unstable. ). Will follow-up at a later date. .       Beatriz Dubose OT  8/9/2017

## 2017-08-09 NOTE — PROGRESS NOTES
"Called to patients room with c/o "trouble breathing".  Patient tachypneic with O2 saturations 97% on room air.  Doppler pressure 40. Rapid response initiated and Dr. Espinoza notified and to bedside.  See rapid response notes.  LVAD coordinator paged x2 with no response. Patient transferred to SICU by Dr. Espinoza, Maksim SICU RN and this RN.  Report given at bedside to ORIANA Dempsey in SICU.      "

## 2017-08-09 NOTE — PROGRESS NOTES
Pt was transferred from floor rapid response on [facility] 4l/m , is aware of abg results.

## 2017-08-09 NOTE — PROCEDURES
Patient sedated and intubated with  family at bedside. VAD interrogation completed this AM in the event changes needed to be made. Will continue to monitor for further issues.     Spoke with RN regarding patient current status.    Pulsatile: Yes   VAD Sounds: HM3  Smooth  Problems / Issues / Alarms with VAD if any: None noted  HCT: 25   Modular Cable Connection Intact(no yellow exposed): YES     VAD Interrogation:  TXP LVAD INTERROGATIONS 8/9/2017 8/9/2017 8/9/2017 8/9/2017 8/9/2017 8/9/2017 8/9/2017   Type HeartMate3 HeartMate3 HeartMate3 HeartMate3 HeartMate3 HeartMate3 HeartMate3   Flow 4.3 4.2 4.2 4.2 4.3 4.2 4.1   Speed 5000 5000 5000 5000 5000 5000 5000   PI 3.5 3.5 3.4 3.6 3.5 3.4 3.2   Power ([place]) 3.4 3.3 3.3 3.3 3.3 3.4 3.3   LSL 4600 4600 4600 4600 4600 4600 4600   Pulsatility - - - - - - -   Some recent data might be hidden   }

## 2017-08-09 NOTE — ASSESSMENT & PLAN NOTE
- appropriate in the setting of VT aggravated by underlying AT/AFL (earlier during the admission)  - 7/28 - setting of AF undersense - device reprogrammed to VVI 80  - tachy therapy turned off  - Was on amio gtt. Now on PO Amio  - EP planning to do lead revision once INR theraputic

## 2017-08-09 NOTE — PT/OT/SLP DISCHARGE
Physical Therapy Discharge Summary    Suman Hayden  MRN: [id number]   Acute on chronic combined systolic and diastolic heart failure   Patient Discharged from acute Physical Therapy on 17 2nd to transfer to ICU and re-intubation. .  Please refer to prior PT noted date on 17 for functional status.     Assessment:   Patient has not met goals.  GOALS:    Physical Therapy Goals        Problem: Physical Therapy Goal    Goal Priority Disciplines Outcome Goal Variances Interventions   Physical Therapy Goal     PT/OT, PT Ongoing (interventions implemented as appropriate)     Description:  Goals to be met by: 17     Patient will increase functional independence with mobility by performin. Supine to sit with MInimal Assistance- not met  2. Sit to supine with MInimal Assistance - not met  3. Sit to stand transfer with Minimal Assistance- not met  4. Bed to chair transfer with Minimal Assistance- not met  5. Gait  x 50 feet with Minimal Assistance. - not met  6. Lower extremity exercise program x15 reps, with supervision, in order to increase LE strength and (I) with functional mobility. - not met                     Reasons for Discontinuation of Therapy Services  Transfer to alternate level of care.      Plan:  Patient Discharged to: to ICU and intubated..

## 2017-08-09 NOTE — PROGRESS NOTES
Dr. Downing at bedside, updated on patient current condition, all gtt rates, VS, recent labs, and output. Notified that Levophed weaned off, Vasopressin weaned off, and epi infusing at 0.06 mcg/kg/min. Received order to restart vaso at 0.04 units/min and decrease epi to 0.04 mcg/kg/min. Also received order to wean propofol as tolerated. Dr. Downing also notified that CVP flat 13, U/O trending down,  35cc & 25cc last 2 hours. Received order for ABG to be drawn now and please notify MD of results. See flowsheet for all orders implemented. Will continue to monitor.

## 2017-08-09 NOTE — PROGRESS NOTES
Daily E and M and VAD Interrogation Note    Reason for Visit:  Patient is seen in follow up for management of:  [] HeartMate II  [] Heartware [] Total artificial heart       [] ECMO           [x] Other - HM III     Interval History:  [] Interval history unobtainable due to intubation.  The [x] implant/[] explant date was 7/27/17    Events - Rapid response called on pt around 2AM 2/2 increased work of breathing and hypotension. Returned to the ICU from floor and found to be in respiratory distress and have a severely distended abdomen. NGT placed with return of ~2L malodorous, angélica-colored output - questionable aspiration. Intubated shortly after without issue - no gastric contents able to be suctioned from tube. Pt remained hypotensive despite fluid boluses and increasing vasopressor support. LVAD flows remained fairly stable throughout. GLENN at bedside showed normal RV and low volume in LV - LVAD speed reduced with some improvement in pressures. Pt also severely acidotic which improved with bicarb amps and fluid resuscitation. Cultures sent and pt started on broad spectrum abx. Currently with decreasing pressor requirements and minimal vent settings.        Review of Systems:   Negative except as above.      Medications:  Current Facility-Administered Medications   Medication Dose Route Frequency Provider Last Rate Last Dose    0.9%  NaCl infusion (for blood administration)   Intravenous Q24H PRN Shayne Espinoza MD        acetaminophen tablet 650 mg  650 mg Oral Q6H PRN Shayne Espinoza MD   650 mg at 08/08/17 2122    albumin human 5% bottle 25 g  25 g Intravenous Once Sunny Downing MD        albumin human 5% bottle 500 mL  500 mL Intravenous PRN Shayne Espinoza MD   500 mL at 08/09/17 0700    albuterol nebulizer solution 2.5 mg  2.5 mg Nebulization Q4H PRN Shayne Espinoza MD        amiodarone 360 mg/200 mL (1.8 mg/mL) infusion  0.5 mg/min Intravenous Continuous Shayne Espinoza MD    Stopped at 08/08/17 1500    amiodarone tablet 400 mg  400 mg Oral BID Nadia Lucia NP   Stopped at 08/09/17 0900    amlodipine tablet 5 mg  5 mg Oral Daily Shayne Espinoza MD   Stopped at 08/09/17 0900    bisacodyl suppository 10 mg  10 mg Rectal Daily PRN Shayne Espinoza MD   10 mg at 08/06/17 2036    cefepime in dextrose 5 % 1 gram/50 mL IVPB 1 g  1 g Intravenous Q12H Shayne Espinoza  mL/hr at 08/09/17 0819 1 g at 08/09/17 0819    dextrose 5 % and 0.9 % NaCl infusion   Intravenous Continuous Sunny Downing MD 80 mL/hr at 08/09/17 0136      DOBUtamine 1000 mg in D5W 250 mL infusion (premix non-titrating)  5 mcg/kg/min (Dosing Weight) Intravenous Continuous Shayne Espinoza MD 6 mL/hr at 08/09/17 0126 5 mcg/kg/min at 08/09/17 0126    DOPamine 400 mg in dextrose 5 % 250 mL infusion (premix) (NON-TITRATING)  5 mcg/kg/min (Dosing Weight) Intravenous Continuous Sunny Downing MD 15 mL/hr at 08/09/17 1000 5 mcg/kg/min at 08/09/17 1000    EPINEPHrine (ADRENALIN) 1 mg/mL (1 mL) injection             EPINEPHrine (ADRENALIN) 4 mg in sodium chloride 0.9% 250 mL infusion  0.02 mcg/kg/min (Dosing Weight) Intravenous Continuous Shayne Espinoza MD 18 mL/hr at 08/09/17 1000 0.06 mcg/kg/min at 08/09/17 1000    fentaNYL injection 50 mcg  50 mcg Intravenous Q4H PRN Shayne Espinoza MD   50 mcg at 08/07/17 1047    fluconazole (DIFLUCAN) IVPB 400 mg 200 mL  400 mg Intravenous Q24H Shayne Espinoza  mL/hr at 08/09/17 0922 400 mg at 08/09/17 0922    furosemide (LASIX) 250 mg in sodium chloride 0.9 % 250 mL infusion (non-titrating)  10 mg/hr Intravenous Continuous Sunny Downing MD   Stopped at 08/09/17 0000    hydrALAZINE injection 10 mg  10 mg Intravenous Q6H PRN Shayne Espinoza MD   10 mg at 08/08/17 1509    magnesium hydroxide 400 mg/5 ml suspension 2,400 mg  30 mL Oral Daily PRN Shayne Espinoza MD        magnesium sulfate 2g in water 50mL IVPB (premix)  2 g  Intravenous PRN Shayne Espinoza MD   2 g at 07/30/17 0731    nitric oxide gas Gas 10 ppm  10 ppm Inhalation Continuous Sunny Downing MD   10 ppm at 08/09/17 0700    norepinephrine 4 mg in dextrose 5% 250 mL infusion (premix) (titrating)  0.02 mcg/kg/min (Dosing Weight) Intravenous Continuous Shayne Espinoza MD 12 mL/hr at 08/09/17 1008 0.04 mcg/kg/min at 08/09/17 1008    ondansetron injection 4 mg  4 mg Intravenous Q6H PRN Shayne Espinoza MD   4 mg at 08/08/17 2112    pantoprazole 40 mg in dextrose 5 % 100 mL infusion (ready to mix system)  8 mg/hr Intravenous Continuous Nicole Vazquez MD 20 mL/hr at 08/09/17 1009 8 mg/hr at 08/09/17 1009    potassium chloride 20 mEq in 100 mL IVPB (FOR CENTRAL LINE ADMINISTRATION ONLY)  40 mEq Intravenous PRN Shayne Espinoza MD 50 mL/hr at 08/08/17 0722 20 mEq at 08/08/17 0722    potassium chloride 20 mEq in 100 mL IVPB (FOR CENTRAL LINE ADMINISTRATION ONLY)  40 mEq Intravenous PRN Shayne Espinoza MD 50 mL/hr at 08/04/17 2200 20 mEq at 08/04/17 2200    propofol (DIPRIVAN) 10 mg/mL infusion  5 mcg/kg/min (Dosing Weight) Intravenous Continuous Shayne Espinoza MD 21.6 mL/hr at 08/09/17 1000 45 mcg/kg/min at 08/09/17 1000    rocuronium (ZEMURON) 10 mg/mL injection             simethicone chewable tablet 80 mg  1 tablet Oral TID PRN Shayne Espinoza MD   80 mg at 08/09/17 0126    sodium chloride 0.9% flush 10 mL  10 mL Intravenous Q6H Sunny Downing MD   10 mL at 08/09/17 0628    And    sodium chloride 0.9% flush 10 mL  10 mL Intravenous PRN Sunny Downing MD        sodium chloride 0.9% flush 3 mL  3 mL Intravenous Q8H Shayne Espinoza MD   3 mL at 08/09/17 0628    sodium phosphate 15 mmol in dextrose 5 % 250 mL IVPB  15 mmol Intravenous PRN Edwige Clay MD 83.3 mL/hr at 07/29/17 2240 15 mmol at 07/29/17 2240    sodium phosphate 20.01 mmol in dextrose 5 % 250 mL IVPB  20.01 mmol Intravenous PRN Edwige Clay MD         sodium phosphate 30 mmol in dextrose 5 % 250 mL IVPB  30 mmol Intravenous PRN Edwige Clay MD        vasopressin (PITRESSIN) 100 Units in dextrose 5 % 100 mL infusion  0.08 Units/min Intravenous Continuous Shayne Espinoza MD 3.6 mL/hr at 08/09/17 1000 0.06 Units/min at 08/09/17 1000    vasopressin (PITRESSIN) 20 unit/mL injection              Physical Examination:  Vital Signs:   Vitals:    08/09/17 1015   BP:    Pulse: 80   Resp: (!) 21   Temp:      Cardiovascular:  [x] Regular rate and rhythm [] Irregular []  None (MATT) []  Other  []  No edema [x]  Edema present  [x]  Clear to auscultation  []  Rales to []  Coarse  []  No rales but   [] Pedal Pulses absent  [x]  Pulses  + throughout  Skin:  Incision is [x]  Clean, dry and intact.  []  Other   Sternum:  [x]  Stable []  Unstable  Driveline(s):   [x]  Clean, dry and intact. []  Other     Labs:  BMP  Lab Results   Component Value Date     08/09/2017    K 3.9 08/09/2017     08/09/2017    CO2 20 (L) 08/09/2017    BUN 51 (H) 08/09/2017    CREATININE 2.3 (H) 08/09/2017    CALCIUM 7.8 (L) 08/09/2017    ANIONGAP 19 (H) 08/09/2017    ESTGFRAFRICA 32.7 (A) 08/09/2017    EGFRNONAA 28.3 (A) 08/09/2017     Magnesium   Date Value Ref Range Status   08/09/2017 1.9 1.6 - 2.6 mg/dL Final   08/09/2017 1.9 1.6 - 2.6 mg/dL Final     Lab Results   Component Value Date    WBC 5.32 08/09/2017    HGB 9.7 (L) 08/09/2017    HCT 28.8 (L) 08/09/2017    MCV 80 (L) 08/09/2017     08/09/2017     Lab Results   Component Value Date    INR 3.3 (H) 08/09/2017    INR 3.3 (H) 08/09/2017    INR 2.5 (H) 08/08/2017     BNP   Date Value Ref Range Status   08/09/2017 1,232 (H) 0 - 99 pg/mL Final     Comment:     Values of less than 100 pg/ml are consistent with non-CHF populations.   08/09/2017 1,232 (H) 0 - 99 pg/mL Final     Comment:     Values of less than 100 pg/ml are consistent with non-CHF populations.   08/07/2017 1,361 (H) 0 - 99 pg/mL Final     Comment:     Values  of less than 100 pg/ml are consistent with non-CHF populations.     LD   Date Value Ref Range Status   08/09/2017 335 (H) 110 - 260 U/L Final     Comment:     Results are increased in hemolyzed samples.   08/08/2017 210 110 - 260 U/L Final     Comment:     Results are increased in hemolyzed samples.   08/07/2017 231 110 - 260 U/L Final     Comment:     Results are increased in hemolyzed samples.     X-Rays:  [x]  I reviewed today's Chest x-ray    Procedure:  Device Interrogation including analysis of device parameters.  Current Settings   [x]  Ventricular Assist Device  []  Total Artificial Heart interrogated  Review of device function is [x]  Stable []  Other   TXP LVAD INTERROGATIONS 8/9/2017 8/9/2017 8/9/2017 8/9/2017 8/9/2017 8/9/2017 8/9/2017   Type HeartMate3 HeartMate3 HeartMate3 HeartMate3 HeartMate3 HeartMate3 HeartMate3   Flow 4.2 4.3 4.2 4.1 4.2 4.2 4.2   Speed 5000 5000 5000 5000 5000 5000 4800   PI 3.6 3.5 3.4 3.2 3.8 3.8 3.5   Power ([place]) 3.3 3.3 3.4 3.3 3.2 3.3 3.3   LSL 4600 4600 4600 4600 - - -   Pulsatility - - - - No Pulse - No Pulse   Some recent data might be hidden       Assessment:  [x]  Primary Cardiomyopathy []  Congestive Heart Failure   []  Atrial Fibrillation []  Ventricular Tachycardia   []  Aftercare cardiac device []  Long term (current) use of anticoagulants   []  Ventilator-associated pneumonia []  Pneumonia viral, unspecified   []  Pneumonia, bacterial, unspecified []  Pneumonia, organism unspecified   []  Hemorrhage of GI tract, unspecified    []  Nosebleed []  Driveline infection   []  Infection VAD device []  New onset of    []        Plan:  [x]  Interval history obtained from ICU attending team member during rounding today  []  VAD/MATT teaching performed with patient  []  Mobilization / Physical Therapy ongoing  []  Anticoagulation []  Ongoing []  Held  []  Studies ordered  []   To OR today for closure.       Total time spent was 30 minutes.  Of which more than 50 percent of  the care dominated counseling and coordinating care with different team members. The VAD was interrogated and all parameters were WNL and no significant findings were found in the history. All these findings are documented in the note above.    Neuro:  - Intubated, sedated    Resp:  - Intubated: Vent Mode: A/C  Oxygen Concentration (%):  [4-100] 50  Resp Rate Total:  [16 br/min-27 br/min] 16 br/min  Vt Set:  [500 mL] 500 mL  PEEP/CPAP:  [5 cmH20] 5 cmH20  Mean Airway Pressure:  [10 fhO85-99 cmH20] 10 cmH20  - Minimize vent settings  - Possible aspiration event causing sepsis - resp cultures sent and empiric abx started.     CV:  - HDS; LVAD numbers fairly stable throughout event  -   - Amio gtt  - Dobutamine off  - Dopamine, epi, levo, vaso drips on - goal to wean vaso and levo to 0.04 each by end of shift  - Hold  given GI bleed    Heme:  - Hgb/Hct 8.4/24.5 - transfused 1 u PRBC over ngiht.   - Continue to trend  - INR 3.3- Hold Coumadin  tonight  - Heparin off  ID:  - afebrile  - WBC 12.4  - Likely aspiration pneumonia - empiric cefepime and diflucan   - continue to monitor     Renal:  - Singer in place  - Strict I/Os   - Adequate UOP  - Lasix gtt at 10 mg/hr  - Cr 2.3, increased over night    FEN/GI:  - Strict NPO  - Stop bowel regimen and all other PO meds given GI bleed  - Protonix gtt started   - Replace lytes PRN    Endo:  - Endocrine following, appreciate assistance  - Accuchecks  - Insulin gtt    Dispo:  - Continue ICU care.       Shayne Espinoza MD  General Surgery PGY-2  Pager: [phone]    Cardiac Surgery Attending E and M (VAD) Note along with VAD Interrogation    I have seen and examined the patient and agree with the findings above    I also reviewed the patients clinical course and:  [x]  Hemodynamic & Respiratory paramters  [x]  Laboratory Data  [x]  Radiological studies     VAD Interrogated [x]      VAD Function is normal. Changes made []  None [x]        Interrogation of Ventricular  assist device was performed with physician analysis of device parameters and review of device function. I have personally reviewed the interrogation findings and agree with findings as stated

## 2017-08-09 NOTE — CONSULTS
Consult received re: nutritional assessment. RD is following pt, last assessed 8/8. Since RD assessment, pt was re-intubated. Will post previous recs below (for diet advancement if pt able to be extubated) and will make new TF recs. Will continue to follow-up as previously scheduled.     Recommendations     1. When medically feasible, advance diet as tolerated to cardiac.   2. Add Boost Plus ONS (chocolate or strawberry) ONS upon diet advancement.   3. If unable to extubate and start oral diet, recommend nutrition support. If warranted, recommend Impact Peptide 1.5, goal rate of 35mL/hr with 4pks Beneprotein/day.    -Will provide 1867kcal (with current propofol), 103g protein, and 647mL fluid - used Taylor state 1746kcal.    -Initiate at 10mL/hr and advance as tolerated by 10mL every 2-4hrs until goal rate.    -Hold for residuals >500mL.    -Additional water flushes per MD.   4. If unable to start TF and TPN warranted, recommend custom TPN AA 8%, D20% at 50mL/hr, no lipids (propofol providing 507kcal).    -Will provide 1707kcal (with propofol), 96g protein, and 1200mL fluid.   5. RD to monitor & follow-up. Will make changes based on propofol rate and vent settings.      Goals: Meet % EEN, EPN

## 2017-08-09 NOTE — SUBJECTIVE & OBJECTIVE
Interval History: Events from overnight noted- pt transferred to unit for SOB and hypotension. Was intubated, started on multiple pressors, NGT placed with ~2L output     Continuous Infusions:   amiodarone Stopped (08/08/17 1500)    dextrose 5 % and 0.9 % NaCl 80 mL/hr at 08/09/17 0136    DOBUTamine 5 mcg/kg/min (08/09/17 0126)    DOPamine 5 mcg/kg/min (08/09/17 1500)    epinephrine infusion 0.04 mcg/kg/min (08/09/17 1500)    furosemide (LASIX) 1 mg/mL infusion (non-titrating) Stopped (08/09/17 0000)    nitric oxide gas      norepinephrine bitartrate-D5W Stopped (08/09/17 1200)    pantoprazole 40 mg in dextrose 5 % 100 mL infusion (ready to mix system) 8 mg/hr (08/09/17 1500)    propofol 35 mcg/kg/min (08/09/17 1500)    TPN ADULT CENTRAL LINE CUSTOM      vasopressin (PITRESSIN) infusion 0.04 Units/min (08/09/17 1500)     Scheduled Meds:   amiodarone  400 mg Oral BID    amlodipine  5 mg Oral Daily    ceFEPime (MAXIPIME) IVPB  1 g Intravenous Q12H    EPINEPHrine        fluconazole (DIFLUCAN) IVPB  400 mg Intravenous Q24H    rocuronium        sodium chloride 0.9%  10 mL Intravenous Q6H    sodium chloride 0.9%  3 mL Intravenous Q8H    vasopressin         PRN Meds:sodium chloride, acetaminophen, albumin human 5%, albuterol sulfate, bisacodyl, dextrose 50%, fentaNYL, glucagon (human recombinant), hydrALAZINE, insulin aspart, magnesium hydroxide 400 mg/5 ml, magnesium sulfate IVPB, ondansetron, potassium chloride, potassium chloride, simethicone, Flushing PICC Protocol **AND** sodium chloride 0.9% **AND** sodium chloride 0.9%, sodium phosphate IVPB, sodium phosphate IVPB, sodium phosphate IVPB    Review of patient's allergies indicates:  No Known Allergies  Objective:     Vital Signs (Most Recent):  Temp: 98.2 °F (36.8 °C) (08/09/17 1100)  Pulse: 80 (08/09/17 1500)  Resp: 17 (08/09/17 1500)  BP: (!) 64/0 (08/09/17 1500)  SpO2: 100 % (08/09/17 1500) Vital Signs (24h Range):  Temp:  [97.4 °F (36.3  °C)-98.3 °F (36.8 °C)] 98.2 °F (36.8 °C)  Pulse:  [] 80  Resp:  [12-41] 17  SpO2:  [91 %-100 %] 100 %  BP: ()/(0-59) 64/0  Arterial Line BP: (36-77)/(29-62) 67/56     Weight: 86.7 kg (191 lb 2.2 oz)  Body mass index is 29.06 kg/m².      Intake/Output Summary (Last 24 hours) at 08/09/17 1526  Last data filed at 08/09/17 1500   Gross per 24 hour   Intake             6413 ml   Output             2770 ml   Net             3643 ml       Hemodynamic Parameters:         Physical Exam   Constitutional: He is oriented to person, place, and time. He appears well-developed and well-nourished. No distress. He is sedated and intubated.   HENT:   Head: Normocephalic and atraumatic.   Eyes: EOM are normal. Pupils are equal, round, and reactive to light.   Neck: Normal range of motion. Neck supple. No JVD present.   Cardiovascular: Normal rate and regular rhythm.  Exam reveals no gallop and no friction rub.    No murmur heard.  + LVAD hum    Pulmonary/Chest: Effort normal and breath sounds normal. He is intubated. No respiratory distress. He has no wheezes. He has no rales.   Abdominal: Soft. He exhibits no distension. There is no tenderness. There is no guarding.   Musculoskeletal: Normal range of motion. He exhibits no edema.   Neurological: He is oriented to person, place, and time. No cranial nerve deficit. Coordination normal.   Skin: Skin is warm and dry. He is not diaphoretic. No erythema.   Nursing note and vitals reviewed.      Significant Labs:  CBC:    Recent Labs  Lab 08/09/17  1159   WBC 4.89   RBC 3.34*   HGB 9.2*   HCT 26.6*      MCV 80*   MCH 27.5   MCHC 34.6     BNP:    Recent Labs  Lab 08/09/17  0349   BNP 1,232*  1,232*     CMP:    Recent Labs  Lab 08/09/17  1159   *   CALCIUM 7.6*   ALBUMIN 3.2*   PROT 5.4*      K 3.9   CO2 22*      BUN 53*   CREATININE 2.2*   ALKPHOS 50*   ALT 89*   *   BILITOT 2.4*      Coagulation:     Recent Labs  Lab 08/09/17  0349   INR 3.3*   3.3*   APTT 38.2*  38.2*     LDH:    Recent Labs  Lab 08/07/17  0418 08/08/17  0512 08/09/17  0349    210 335*     Microbiology:  Microbiology Results (last 7 days)     Procedure Component Value Units Date/Time    Culture, Respiratory with Gram Stain [[id number]] Collected:  08/09/17 0723    Order Status:  Completed Specimen:  Respiratory from Endotracheal Aspirate Updated:  08/09/17 1252     Gram Stain (Respiratory) <10 epithelial cells per low power field.     Gram Stain (Respiratory) Few WBC's     Gram Stain (Respiratory) Many Gram negative rods     Gram Stain (Respiratory) Few Gram positive rods     Gram Stain (Respiratory) Few Gram positive cocci    Urine culture [[id number]] Collected:  08/09/17 0725    Order Status:  Sent Specimen:  Urine from Urine, Catheterized Updated:  08/09/17 0926    Blood culture [[id number]] Collected:  08/09/17 0733    Order Status:  Sent Specimen:  Blood from Peripheral, Antecubital, Left Updated:  08/09/17 0859    Blood culture [[id number]] Collected:  08/09/17 0813    Order Status:  Sent Specimen:  Blood from Line, Arterial, Right Updated:  08/09/17 0858    Blood culture [[id number]] Collected:  08/04/17 1653    Order Status:  Completed Specimen:  Blood from Peripheral, Wrist, Right Updated:  08/08/17 2012     Blood Culture, Routine No Growth to date     Blood Culture, Routine No Growth to date     Blood Culture, Routine No Growth to date     Blood Culture, Routine No Growth to date     Blood Culture, Routine No Growth to date    Blood culture [[id number]] Collected:  08/04/17 1654    Order Status:  Completed Specimen:  Blood from Peripheral, Wrist, Left Updated:  08/08/17 2012     Blood Culture, Routine No Growth to date     Blood Culture, Routine No Growth to date     Blood Culture, Routine No Growth to date     Blood Culture, Routine No Growth to date     Blood Culture, Routine No Growth to date    Urine culture [[id number]] Collected:  08/04/17 1655    Order Status:   Completed Specimen:  Urine from Urine, Catheterized Updated:  08/05/17 2116     Urine Culture, Routine No growth          I have reviewed all pertinent labs within the past 24 hours.    Estimated Creatinine Clearance: 34.9 mL/min (based on Cr of 2.2).    Diagnostic Results:  I have reviewed and interpreted all pertinent imaging results/findings within the past 24 hours.

## 2017-08-09 NOTE — PROGRESS NOTES
[facility]  Cardiac Electrophysiology  Progress Note    Admission Date: 7/18/2017  Code Status: Prior   Attending Physician: Sunny Downing MD   Expected Discharge Date: 8/16/2017  Principal Problem:Acute on chronic combined systolic and diastolic heart failure    Subjective:     Interval History: patient intubated overnight. upon transfer to floor he complained of significant abdominal pain, was transferred back to SICU, became non-responsive, was intubated, and they proceeded to drain approx 2L gastric content which, this morning, looks blood tinged     Review of Systems   Unable to perform ROS: intubated   Constitution: Positive for decreased appetite.     Objective:     Vital Signs (Most Recent):  Temp: 98.3 °F (36.8 °C) (08/09/17 0715)  Pulse: 80 (08/09/17 1015)  Resp: (!) 21 (08/09/17 1015)  BP: (!) 60/0 (08/09/17 0900)  SpO2: 100 % (08/09/17 1015) Vital Signs (24h Range):  Temp:  [97.4 °F (36.3 °C)-98.3 °F (36.8 °C)] 98.3 °F (36.8 °C)  Pulse:  [] 80  Resp:  [12-41] 21  SpO2:  [91 %-100 %] 100 %  BP: ()/(0-59) 60/0  Arterial Line BP: ()/(29-86) 72/59     Weight: 86.7 kg (191 lb 2.2 oz)  Body mass index is 29.06 kg/m².     SpO2: 100 %  O2 Device (Oxygen Therapy): ventilator    Physical Exam   Constitutional: He is oriented to person, place, and time. He appears well-developed and well-nourished.   HENT:   Head: Normocephalic and atraumatic.   Eyes: Conjunctivae and EOM are normal. Pupils are equal, round, and reactive to light.   Neck: Normal range of motion. Neck supple. No JVD present.   Cardiovascular:   Smooth vad sounds     Pulmonary/Chest: Effort normal and breath sounds normal. No respiratory distress. He has no wheezes. He has no rales. He exhibits no tenderness.   Abdominal: Soft. Bowel sounds are normal. He exhibits distension. There is tenderness.   diffuse   Musculoskeletal: Normal range of motion. He exhibits no edema or tenderness.   Neurological: He is alert  and oriented to person, place, and time.   Skin: Skin is warm and dry. No erythema. No pallor.       Significant Labs: EP:   Recent Labs  Lab 08/08/17  0512  08/08/17  2134  08/09/17  0349 08/09/17  0807     < > 136  --  135*  143  143 144   K 3.6  < > 4.0  --  4.8  4.0  4.0 3.9     < > 103  --  106  103  103 105   CO2 22*  < > 20*  --  13*  21*  21* 20*   GLU 93  < > 110  --  89  83  83 125*   BUN 47*  < > 50*  --  51*  52*  52* 51*   CREATININE 1.7*  < > 2.1*  --  2.4*  2.3*  2.3* 2.3*   CALCIUM 8.2*  < > 8.7  --  7.7*  7.3*  7.3* 7.8*   PROT  --   --   --   --  5.0*  4.2* 5.4*   ALBUMIN  --   --   --   --  2.2*  1.9* 3.0*   BILITOT  --   --   --   --  1.6*  1.5* 2.2*   ALKPHOS  --   --   --   --  68  56 57   AST  --   --   --   --  93*  93* 271*   ALT  --   --   --   --  35  34 92*   ANIONGAP 12  < > 13  --  16  19*  19* 19*   ESTGFRAFRICA 47.2*  < > 36.6*  --  31.1*  32.7*  32.7* 32.7*   EGFRNONAA 40.8*  < > 31.6*  --  26.9*  28.3*  28.3* 28.3*   WBC 13.01*  --   --   --  12.39  12.39 5.32   HGB 8.5*  --   --   --  8.4*  8.4* 9.7*   HCT 25.3*  --   --   < > 24.5*  24.5* 28.8*     --   --   --  298  298 298   INR 2.5*  --   --   --  3.3*  3.3*  --    < > = values in this interval not displayed.    Significant Imaging: Echocardiogram:   2D echo with color flow doppler:   Results for orders placed or performed during the hospital encounter of 07/18/17   2D echo with color flow doppler   Result Value Ref Range    EF 10 (A) 55 - 65    Mitral Valve Regurgitation MILD     Diastolic Dysfunction Yes (A)     Est. PA Systolic Pressure 25.81     Mitral Valve Mobility NORMAL      Assessment and Plan:     AICD (automatic cardioverter/defibrillator) present    -pt reintubated overnight for airway protection.  -Per staff, when patient is stable, will likely switch the RV and LV ports as he does not need to BiV pace but simply sense for VT/VF protection   -ICD is off but  patient has pads to chest wall in case it is needed  -has frequent afib with PVCs at his baseline and this continues  -Has LVAD (HMIII) in place at this time  -procedure will likely be done once gi issues resolve so dependant upon patient's gi function  -please reconsult when gi issues resolve and patient is more clinically stable (extubated and HD stable)  -feel free to call us with any questions.  We thank you for allowing us to be a part of Mr Hayden's care.                  V-tach    Currently on amiodarone 400mg po BID   Continue Mg and K repletion maintaining K>4, Mg>2            Casey Newman MD  Cardiac Electrophysiology  [facility] [address]

## 2017-08-09 NOTE — PT/OT/SLP DISCHARGE
Occupational Therapy Discharge Summary    Suman Hayden  MRN: [id number]   Acute on chronic combined systolic and diastolic heart failure   Patient Discharged from acute Occupational Therapy on 8/8/2017. Pt back to ICU and is currently re- intubated  Please refer to prior OT note dated on 8/8 for functional status.     Assessment:   Patient was discharge unexpectedly.  Information required to complete and accurate discharge summary is unknown.  Refer to therapy initial evaluation and last progress note for initial and most recent functional status and goal achievement.  Recommendations made may be found in medical record. Patient appropriate for care in another setting. Patient has not met goals.  GOALS:    Occupational Therapy Goals        Problem: Occupational Therapy Goal    Goal Priority Disciplines Outcome Interventions   Occupational Therapy Goal     OT, PT/OT Ongoing (interventions implemented as appropriate)    Description:  Goals to be met by:  2 weeks 8/12/17     Patient will increase functional independence with ADLs by performing:  Feeding: Independent   UE Dressing with Supervision.  LE Dressing with Supervision.  Grooming while standing with Supervision.  Toileting from toilet with Supervision for hygiene and clothing management.   Stand pivot transfers with Supervision.  Toilet transfer to toilet with Supervision.  Pt will be independent with LVAD yasmin't.                 Multidisciplinary Problems (Resolved)        Problem: Occupational Therapy Goal    Goal Priority Disciplines Outcome Interventions   Occupational Therapy Goal   (Resolved)     OT, PT/OT  Error    Description:  Goals to be met by: 8/04/2017    Patient will increase functional independence with ADLs by performing:    Increased functional strength to 5/5 for improved ADL performance.  Upper extremity exercise program and R LE ankle pumps  3x 10 reps per handout, with independence.                    Reasons for Discontinuation of  Therapy Services  back to ICU      Plan:  Patient Discharged to: to ICU.

## 2017-08-09 NOTE — PROGRESS NOTES
Daily E and M and VAD Interrogation Note    Reason for Visit:  Patient is seen in follow up for management of:  [] HeartMate II  [] Heartware [] Total artificial heart       [] ECMO           [x] Other - HM III     Interval History:  [] Interval history unobtainable due to intubation.  The [x] implant/[] explant date was 7/27/17    Events - NAEON. Afebrile. UOP 2.5L. NGT removed yesterday, tolerating sips. Continues to have bowel funstion. Ambulating. On dobutamine 5. Lasix 10/hr.     Review of Systems:   Negative except as above.      Medications:  Current Facility-Administered Medications   Medication Dose Route Frequency Provider Last Rate Last Dose    acetaminophen tablet 650 mg  650 mg Oral Q6H PRN Shayne Espinoza MD        albumin human 5% bottle 500 mL  500 mL Intravenous PRN Shayne Espinoza MD   500 mL at 07/28/17 1755    albuterol nebulizer solution 2.5 mg  2.5 mg Nebulization Q4H PRN Shayne Espinoza MD        amiodarone 360 mg/200 mL (1.8 mg/mL) infusion  0.5 mg/min Intravenous Continuous Shayne Espinoza MD   Stopped at 08/08/17 1500    amiodarone tablet 400 mg  400 mg Oral BID Nadia Lucia NP   400 mg at 08/08/17 0905    amlodipine tablet 5 mg  5 mg Oral Daily Shayne Espinoza MD   5 mg at 08/08/17 0905    aspirin suppository 300 mg  300 mg Rectal Daily Shayne Espinoza MD   300 mg at 08/08/17 0905    bisacodyl suppository 10 mg  10 mg Rectal Daily PRN Shayne Espinoza MD   10 mg at 08/06/17 2036    dextrose 5 % and 0.9 % NaCl infusion   Intravenous Continuous Sunny Downing MD 80 mL/hr at 08/08/17 1700      DOBUtamine 1000 mg in D5W 250 mL infusion (premix non-titrating)  5 mcg/kg/min (Dosing Weight) Intravenous Continuous Shayne Espinoza MD 6 mL/hr at 08/08/17 1700 5 mcg/kg/min at 08/08/17 1700    docusate sodium capsule 100 mg  100 mg Oral BID Nadia Lucia NP        fentaNYL injection 50 mcg  50 mcg Intravenous Q4H PRN Shayne Espinoza MD   50  mcg at 08/07/17 1047    furosemide (LASIX) 250 mg in sodium chloride 0.9 % 250 mL infusion (non-titrating)  10 mg/hr Intravenous Continuous Sunny Downing MD 10 mL/hr at 08/08/17 1700 10 mg/hr at 08/08/17 1700    heparin 25,000 units in dextrose 5% 250 mL (100 units/mL) infusion (heparin infusion)  600 Units/hr Intravenous Continuous Bon Vanegas MD 6 mL/hr at 08/08/17 1700 600 Units/hr at 08/08/17 1700    hydrALAZINE injection 10 mg  10 mg Intravenous Q6H PRN Shayne Espinoza MD   10 mg at 08/08/17 1509    magnesium hydroxide 400 mg/5 ml suspension 2,400 mg  30 mL Oral Daily PRN Shayne Espinoza MD        magnesium sulfate 2g in water 50mL IVPB (premix)  2 g Intravenous PRN Shayne Espinoza MD   2 g at 07/30/17 0731    ondansetron injection 4 mg  4 mg Intravenous Q6H PRN Shayne Espinoza MD   4 mg at 08/04/17 0516    pantoprazole injection 40 mg  40 mg Intravenous Daily Shayne Espinoza MD   40 mg at 08/08/17 0905    polyethylene glycol packet 17 g  17 g Oral Daily Nadia Lucia NP   17 g at 08/08/17 1509    potassium chloride 20 mEq in 100 mL IVPB (FOR CENTRAL LINE ADMINISTRATION ONLY)  40 mEq Intravenous PRN Shayne Espinoza MD 50 mL/hr at 08/08/17 0722 20 mEq at 08/08/17 0722    potassium chloride 20 mEq in 100 mL IVPB (FOR CENTRAL LINE ADMINISTRATION ONLY)  40 mEq Intravenous PRN Shayne Espinoza MD 50 mL/hr at 08/04/17 2200 20 mEq at 08/04/17 2200    pravastatin tablet 20 mg  20 mg Oral QHS Lorena Payton MD   20 mg at 08/07/17 2109    sodium chloride 0.9% flush 10 mL  10 mL Intravenous Q6H Sunny Downing MD   10 mL at 08/08/17 0600    And    sodium chloride 0.9% flush 10 mL  10 mL Intravenous PRN Sunny Downing MD        sodium chloride 0.9% flush 3 mL  3 mL Intravenous Q8H Shayne Espinoza MD   3 mL at 08/08/17 0600    sodium phosphate 15 mmol in dextrose 5 % 250 mL IVPB  15 mmol Intravenous PRN Edwige Clay MD 83.3 mL/hr at 07/29/17 2240 15 mmol  at 07/29/17 2240    sodium phosphate 20.01 mmol in dextrose 5 % 250 mL IVPB  20.01 mmol Intravenous PRN Edwige Clay MD        sodium phosphate 30 mmol in dextrose 5 % 250 mL IVPB  30 mmol Intravenous PRN Edwige Clay MD         Physical Examination:  Vital Signs:   Vitals:    08/08/17 1817   BP: (!) 72/0   Pulse:    Resp:    Temp:      Cardiovascular:  [x] Regular rate and rhythm [] Irregular []  None (MATT) []  Other  []  No edema [x]  Edema present  [x]  Clear to auscultation  []  Rales to []  Coarse  []  No rales but   [] Pedal Pulses absent  [x]  Pulses  + throughout  Skin:  Incision is [x]  Clean, dry and intact.  []  Other   Sternum:  [x]  Stable []  Unstable  Driveline(s):   [x]  Clean, dry and intact. []  Other     Labs:  BMP  Lab Results   Component Value Date     08/08/2017    K 3.9 08/08/2017     08/08/2017    CO2 24 08/08/2017    BUN 46 (H) 08/08/2017    CREATININE 1.8 (H) 08/08/2017    CALCIUM 8.5 (L) 08/08/2017    ANIONGAP 9 08/08/2017    ESTGFRAFRICA 44.0 (A) 08/08/2017    EGFRNONAA 38.1 (A) 08/08/2017     Magnesium   Date Value Ref Range Status   08/08/2017 2.3 1.6 - 2.6 mg/dL Final     Lab Results   Component Value Date    WBC 13.01 (H) 08/08/2017    HGB 8.5 (L) 08/08/2017    HCT 25.3 (L) 08/08/2017    MCV 82 08/08/2017     08/08/2017     Lab Results   Component Value Date    INR 2.5 (H) 08/08/2017    INR 2.5 (H) 08/07/2017    INR 2.7 (H) 08/06/2017     BNP   Date Value Ref Range Status   08/07/2017 1,361 (H) 0 - 99 pg/mL Final     Comment:     Values of less than 100 pg/ml are consistent with non-CHF populations.   08/04/2017 1,381 (H) 0 - 99 pg/mL Final     Comment:     Values of less than 100 pg/ml are consistent with non-CHF populations.   08/02/2017 1,398 (H) 0 - 99 pg/mL Final     Comment:     Values of less than 100 pg/ml are consistent with non-CHF populations.     LD   Date Value Ref Range Status   08/08/2017 210 110 - 260 U/L Final     Comment:      Results are increased in hemolyzed samples.   08/07/2017 231 110 - 260 U/L Final     Comment:     Results are increased in hemolyzed samples.   08/06/2017 307 (H) 110 - 260 U/L Final     Comment:     Results are increased in hemolyzed samples.     X-Rays:  [x]  I reviewed today's Chest x-ray    Procedure:  Device Interrogation including analysis of device parameters.  Current Settings   [x]  Ventricular Assist Device  []  Total Artificial Heart interrogated  Review of device function is [x]  Stable []  Other   TXP LVAD INTERROGATIONS 8/8/2017 8/8/2017 8/8/2017 8/8/2017 8/8/2017 8/8/2017 8/8/2017   Type HeartMate3 HeartMate3 HeartMate3 HeartMate3 HeartMate3 HeartMate3 HeartMate3   Flow 4.6 4.4 4.4 3.7 3.6 3.7 3.6   Speed 5200 5250 5250 5200 5250 5200 5250   PI 2.3 2.9 3.0 5.7 5.5 5.6 5.6   Power ([place]) 3.6 3.6 3.6 3.6 3.6 3.5 3.6   LSL - 4800 4800 4800 4800 4800 4800   Pulsatility - - - - - - -   Some recent data might be hidden       Assessment:  [x]  Primary Cardiomyopathy []  Congestive Heart Failure   []  Atrial Fibrillation []  Ventricular Tachycardia   []  Aftercare cardiac device []  Long term (current) use of anticoagulants   []  Ventilator-associated pneumonia []  Pneumonia viral, unspecified   []  Pneumonia, bacterial, unspecified []  Pneumonia, organism unspecified   []  Hemorrhage of GI tract, unspecified    []  Nosebleed []  Driveline infection   []  Infection VAD device []  New onset of    []        Plan:  [x]  Interval history obtained from ICU attending team member during rounding today  []  VAD/MATT teaching performed with patient  []  Mobilization / Physical Therapy ongoing  []  Anticoagulation []  Ongoing []  Held  []  Studies ordered  [x]   To OR today for closure.       Total time spent was 30 minutes.  Of which more than 50 percent of the care dominated counseling and coordinating care with different team members. The VAD was interrogated and all parameters were WNL and no significant findings  were found in the history. All these findings are documented in the note above.    Neuro:  - Continue current pain control regimen    Resp:  - Extubated   - Minimize supplemental O2 requirements  - Pulm toilet    CV:  - HDS; LVAD numbers stable o/n  -   - Amio gtt  - Dobutamine at 5  - HTN   -     Heme:  - Hgb/Hct 8.5/25  - Continue to trend  - INR therapeutic at 2.5 - Coumadin 2 mg tonight  - Heparin at 600, non-titrating    ID:  - afebrile  - WBC 13  - continue to monitor     Renal:  - Singer in place  - Strict I/Os   - Adequate UOP  - Lasix gtt at 10 mg/hr  - Cr 1.7, trending down    FEN/GI:  - CLD, ADAT  - Miralax BID  - Replace lytes PRN    Endo:  - Endocrine following, appreciate assistance  - Accuchecks  - Insulin gtt    Dispo:  - Stepdown to floor.       Shayne Espinoza MD  General Surgery PGY-2  Pager: [phone]    Cardiac Surgery Attending E and M (VAD) Note along with VAD Interrogation    I have seen and examined the patient and agree with the findings above    I also reviewed the patients clinical course and:  [x]  Hemodynamic & Respiratory paramters  [x]  Laboratory Data  [x]  Radiological studies     VAD Interrogated [x]      VAD Function is normal. Changes made []  None [x]        Interrogation of Ventricular assist device was performed with physician analysis of device parameters and review of device function. I have personally reviewed the interrogation findings and agree with findings as stated

## 2017-08-09 NOTE — PROGRESS NOTES
Report received from SMILEY Mccauley SICU RN. Aware of patient current condition, all gtt rates, VS, output, recent labs, vent settings, VAD numbers, and all events overnight. Care assumed and Dr. Downing updated on patient's current condition, all gtt rates, VS, output, recent labs, vent settings, VAD numbers, and most recent ABG. Received order for 500cc 5% albumin and 50 mEq Sodium Bicarbonate. Received order for blood culture x2, urine culture, and respiratory culture. Also received order to hold abx until cultures drawn. See flowsheet for all orders received and implemented. Will continue to monitor.

## 2017-08-09 NOTE — PT/OT/SLP PROGRESS
Physical Therapy      Suman Hayden  MRN: [id number]    Patient not seen today secondary to  (pt on hold 2nd to being on multiple pressors and beind medically unstable. ). Will follow-up at a later date. .    Astrid Whaley, PT

## 2017-08-09 NOTE — PROGRESS NOTES
Dr. Downing notified of most recent ABG results, I ncluding lactic trending down to 5.3 from 6.6. Also notified that after restarting vasopressin at 0.04 Units/min and decreasing Epi to 0.04 mcg/kg/min, MAP now 58-59. Dr. Downing stated increase Epi to 0.06 mcg/kg/min and leave Vaso at 0.04 units/min. Will continue to monitor.

## 2017-08-09 NOTE — PLAN OF CARE
Problem: Patient Care Overview  Goal: Plan of Care Review  Outcome: Ongoing (interventions implemented as appropriate)  Pt remains intubated, FiO2 50% and Peep 5. Nitric weaned to 2.5 ppm. Epi infusing at 0.06 mcg/kg/min and Vasopressin at 0.04 Units/min, Dopamine at 5 mcg/kg/,min, and Levophed weaned off. MAPs remain 60-65. Propofol weaned to 35 mcg/kg/min. Protonix infusing at 8 mg/hr. Pt has received 1 L 5% albumin and 2 amps sodium bicarbonate during day shift. Pt pan cultured. 2D echo done at bedside, EF 10%. OG tube remains to suction, total of 500cc sanguineous brown drainage. All PO meds held today. TPN order for nutrition tonight. Accuchecks now q4hr. ABGs and lactic monitored q4hr, lactic trending down to 5.3. CBC and CMP monitored q6hr. LVAD speed remains at 5000, all VAD numbers stable today. LVAD dressing change done today per SICU RN. Patient wife at bedside. Plan of care reviewed with patient wife, all questions answered per SICU RN and support provided.

## 2017-08-10 ENCOUNTER — RESEARCH ENCOUNTER (OUTPATIENT)
Dept: RESEARCH | Facility: HOSPITAL | Age: 67
End: 2017-08-10

## 2017-08-10 PROBLEM — N17.9 ACUTE KIDNEY INJURY SUPERIMPOSED ON CKD: Status: ACTIVE | Noted: 2017-08-10

## 2017-08-10 PROBLEM — N18.9 ACUTE KIDNEY INJURY SUPERIMPOSED ON CKD: Status: ACTIVE | Noted: 2017-08-10

## 2017-08-10 LAB
ALBUMIN SERPL BCP-MCNC: 2.6 G/DL
ALBUMIN SERPL BCP-MCNC: 2.7 G/DL
ALBUMIN SERPL BCP-MCNC: 2.9 G/DL
ALLENS TEST: ABNORMAL
ALP SERPL-CCNC: 67 U/L
ALP SERPL-CCNC: 67 U/L
ALP SERPL-CCNC: 70 U/L
ALT SERPL W/O P-5'-P-CCNC: 64 U/L
ALT SERPL W/O P-5'-P-CCNC: 79 U/L
ALT SERPL W/O P-5'-P-CCNC: 86 U/L
ANION GAP SERPL CALC-SCNC: 11 MMOL/L
ANION GAP SERPL CALC-SCNC: 14 MMOL/L
ANION GAP SERPL CALC-SCNC: 15 MMOL/L
ANISOCYTOSIS BLD QL SMEAR: SLIGHT
APTT BLDCRRT: 58.8 SEC
AST SERPL-CCNC: 117 U/L
AST SERPL-CCNC: 57 U/L
AST SERPL-CCNC: 91 U/L
BACTERIA #/AREA URNS AUTO: ABNORMAL /HPF
BACTERIA UR CULT: NO GROWTH
BASOPHILS # BLD AUTO: 0.01 K/UL
BASOPHILS NFR BLD: 0.1 %
BILIRUB SERPL-MCNC: 2.4 MG/DL
BILIRUB SERPL-MCNC: 2.5 MG/DL
BILIRUB SERPL-MCNC: 2.6 MG/DL
BILIRUB UR QL STRIP: NEGATIVE
BUN SERPL-MCNC: 61 MG/DL
BUN SERPL-MCNC: 67 MG/DL
BUN SERPL-MCNC: 71 MG/DL
BURR CELLS BLD QL SMEAR: ABNORMAL
BURR CELLS BLD QL SMEAR: ABNORMAL
CALCIUM SERPL-MCNC: 7.4 MG/DL
CALCIUM SERPL-MCNC: 7.4 MG/DL
CALCIUM SERPL-MCNC: 7.7 MG/DL
CHLORIDE SERPL-SCNC: 103 MMOL/L
CLARITY UR REFRACT.AUTO: ABNORMAL
CO2 SERPL-SCNC: 23 MMOL/L
CO2 SERPL-SCNC: 25 MMOL/L
CO2 SERPL-SCNC: 25 MMOL/L
COLOR UR AUTO: ABNORMAL
CREAT SERPL-MCNC: 2.3 MG/DL
CREAT UR-MCNC: 51 MG/DL
DELSYS: ABNORMAL
DIFFERENTIAL METHOD: ABNORMAL
EOSINOPHIL # BLD AUTO: 0 K/UL
EOSINOPHIL # BLD AUTO: 0.1 K/UL
EOSINOPHIL NFR BLD: 0.1 %
EOSINOPHIL NFR BLD: 0.3 %
EOSINOPHIL NFR BLD: 0.4 %
EOSINOPHIL NFR BLD: 0.6 %
ERYTHROCYTE [DISTWIDTH] IN BLOOD BY AUTOMATED COUNT: 16 %
ERYTHROCYTE [DISTWIDTH] IN BLOOD BY AUTOMATED COUNT: 16.1 %
ERYTHROCYTE [SEDIMENTATION RATE] IN BLOOD BY WESTERGREN METHOD: 14 MM/H
ERYTHROCYTE [SEDIMENTATION RATE] IN BLOOD BY WESTERGREN METHOD: 14 MM/H
ERYTHROCYTE [SEDIMENTATION RATE] IN BLOOD BY WESTERGREN METHOD: 21 MM/H
EST. GFR  (AFRICAN AMERICAN): 32.7 ML/MIN/1.73 M^2
EST. GFR  (NON AFRICAN AMERICAN): 28.3 ML/MIN/1.73 M^2
FIO2: 50
FLOW: 60
GIANT PLATELETS BLD QL SMEAR: PRESENT
GLUCOSE SERPL-MCNC: 153 MG/DL
GLUCOSE SERPL-MCNC: 172 MG/DL
GLUCOSE SERPL-MCNC: 202 MG/DL
GLUCOSE UR QL STRIP: NEGATIVE
HCO3 UR-SCNC: 24.4 MMOL/L (ref 24–28)
HCO3 UR-SCNC: 27.1 MMOL/L (ref 24–28)
HCO3 UR-SCNC: 27.3 MMOL/L (ref 24–28)
HCO3 UR-SCNC: 28.9 MMOL/L (ref 24–28)
HCO3 UR-SCNC: 30.7 MMOL/L (ref 24–28)
HCO3 UR-SCNC: 31.1 MMOL/L (ref 24–28)
HCT VFR BLD AUTO: 24.1 %
HCT VFR BLD AUTO: 25.3 %
HCT VFR BLD AUTO: 26.8 %
HCT VFR BLD AUTO: 27.1 %
HGB BLD-MCNC: 8.3 G/DL
HGB BLD-MCNC: 8.8 G/DL
HGB BLD-MCNC: 9.1 G/DL
HGB BLD-MCNC: 9.3 G/DL
HGB UR QL STRIP: ABNORMAL
HYALINE CASTS UR QL AUTO: 0 /LPF
HYPOCHROMIA BLD QL SMEAR: ABNORMAL
HYPOCHROMIA BLD QL SMEAR: ABNORMAL
INR PPP: 6.4
INR PPP: 7.3
KETONES UR QL STRIP: NEGATIVE
LDH SERPL L TO P-CCNC: 2.12 MMOL/L (ref 0.36–1.25)
LDH SERPL L TO P-CCNC: 2.25 MMOL/L (ref 0.36–1.25)
LDH SERPL L TO P-CCNC: 2.35 MMOL/L (ref 0.36–1.25)
LDH SERPL L TO P-CCNC: 2.36 MMOL/L (ref 0.36–1.25)
LDH SERPL L TO P-CCNC: 2.8 MMOL/L (ref 0.36–1.25)
LDH SERPL L TO P-CCNC: 3.68 MMOL/L (ref 0.36–1.25)
LDH SERPL L TO P-CCNC: 319 U/L
LEUKOCYTE ESTERASE UR QL STRIP: ABNORMAL
LYMPHOCYTES # BLD AUTO: 0.7 K/UL
LYMPHOCYTES # BLD AUTO: 0.8 K/UL
LYMPHOCYTES # BLD AUTO: 0.9 K/UL
LYMPHOCYTES # BLD AUTO: 0.9 K/UL
LYMPHOCYTES NFR BLD: 7.2 %
LYMPHOCYTES NFR BLD: 8.7 %
LYMPHOCYTES NFR BLD: 9.1 %
LYMPHOCYTES NFR BLD: 9.2 %
MAGNESIUM SERPL-MCNC: 2 MG/DL
MCH RBC QN AUTO: 26.9 PG
MCH RBC QN AUTO: 27.2 PG
MCH RBC QN AUTO: 27.2 PG
MCH RBC QN AUTO: 27.6 PG
MCHC RBC AUTO-ENTMCNC: 34 G/DL
MCHC RBC AUTO-ENTMCNC: 34.3 G/DL
MCHC RBC AUTO-ENTMCNC: 34.4 G/DL
MCHC RBC AUTO-ENTMCNC: 34.8 G/DL
MCV RBC AUTO: 78 FL
MCV RBC AUTO: 78 FL
MCV RBC AUTO: 80 FL
MCV RBC AUTO: 80 FL
MICROSCOPIC COMMENT: ABNORMAL
MIN VOL: 11.2
MIN VOL: 9.96
MODE: ABNORMAL
MONOCYTES # BLD AUTO: 0.3 K/UL
MONOCYTES # BLD AUTO: 0.6 K/UL
MONOCYTES # BLD AUTO: 0.7 K/UL
MONOCYTES # BLD AUTO: 0.8 K/UL
MONOCYTES NFR BLD: 3 %
MONOCYTES NFR BLD: 6.3 %
MONOCYTES NFR BLD: 6.9 %
MONOCYTES NFR BLD: 7.9 %
NEUTROPHILS # BLD AUTO: 7.9 K/UL
NEUTROPHILS # BLD AUTO: 8.2 K/UL
NEUTROPHILS # BLD AUTO: 8.3 K/UL
NEUTROPHILS # BLD AUTO: 8.5 K/UL
NEUTROPHILS NFR BLD: 82.9 %
NEUTROPHILS NFR BLD: 84.2 %
NEUTROPHILS NFR BLD: 85.2 %
NEUTROPHILS NFR BLD: 87.6 %
NITRITE UR QL STRIP: NEGATIVE
OVALOCYTES BLD QL SMEAR: ABNORMAL
PCO2 BLDA: 33.6 MMHG (ref 35–45)
PCO2 BLDA: 35.8 MMHG (ref 35–45)
PCO2 BLDA: 36.7 MMHG (ref 35–45)
PCO2 BLDA: 38.2 MMHG (ref 35–45)
PCO2 BLDA: 39 MMHG (ref 35–45)
PCO2 BLDA: 39.5 MMHG (ref 35–45)
PEEP: 5
PH SMN: 7.46 [PH] (ref 7.35–7.45)
PH SMN: 7.47 [PH] (ref 7.35–7.45)
PH SMN: 7.48 [PH] (ref 7.35–7.45)
PH SMN: 7.49 [PH] (ref 7.35–7.45)
PH SMN: 7.5 [PH] (ref 7.35–7.45)
PH SMN: 7.53 [PH] (ref 7.35–7.45)
PH UR STRIP: 5 [PH] (ref 5–8)
PHOSPHATE SERPL-MCNC: 3.9 MG/DL
PIP: 16
PIP: 18
PIP: 23
PLATELET # BLD AUTO: 185 K/UL
PLATELET # BLD AUTO: 237 K/UL
PLATELET # BLD AUTO: 247 K/UL
PLATELET # BLD AUTO: 277 K/UL
PLATELET BLD QL SMEAR: ABNORMAL
PMV BLD AUTO: 10.9 FL
PMV BLD AUTO: 11.1 FL
PMV BLD AUTO: 11.5 FL
PMV BLD AUTO: 11.6 FL
PO2 BLDA: 106 MMHG (ref 80–100)
PO2 BLDA: 150 MMHG (ref 80–100)
PO2 BLDA: 178 MMHG (ref 80–100)
PO2 BLDA: 188 MMHG (ref 80–100)
PO2 BLDA: 201 MMHG (ref 80–100)
PO2 BLDA: 211 MMHG (ref 80–100)
POC BE: 1 MMOL/L
POC BE: 3 MMOL/L
POC BE: 4 MMOL/L
POC BE: 5 MMOL/L
POC BE: 8 MMOL/L
POC BE: 8 MMOL/L
POC SATURATED O2: 100 % (ref 95–100)
POC SATURATED O2: 98 % (ref 95–100)
POC TCO2: 25 MMOL/L (ref 23–27)
POC TCO2: 28 MMOL/L (ref 23–27)
POC TCO2: 28 MMOL/L (ref 23–27)
POC TCO2: 30 MMOL/L (ref 23–27)
POC TCO2: 32 MMOL/L (ref 23–27)
POC TCO2: 32 MMOL/L (ref 23–27)
POCT GLUCOSE: 146 MG/DL (ref 70–110)
POCT GLUCOSE: 161 MG/DL (ref 70–110)
POCT GLUCOSE: 164 MG/DL (ref 70–110)
POCT GLUCOSE: 184 MG/DL (ref 70–110)
POCT GLUCOSE: 202 MG/DL (ref 70–110)
POIKILOCYTOSIS BLD QL SMEAR: SLIGHT
POIKILOCYTOSIS BLD QL SMEAR: SLIGHT
POLYCHROMASIA BLD QL SMEAR: ABNORMAL
POTASSIUM SERPL-SCNC: 3.2 MMOL/L
POTASSIUM SERPL-SCNC: 3.6 MMOL/L
POTASSIUM SERPL-SCNC: 3.8 MMOL/L
PROT SERPL-MCNC: 5.1 G/DL
PROT SERPL-MCNC: 5.2 G/DL
PROT SERPL-MCNC: 5.2 G/DL
PROT UR QL STRIP: ABNORMAL
PROT UR-MCNC: 50 MG/DL
PROT/CREAT RATIO, UR: 0.98
PROTHROMBIN TIME: 66.7 SEC
PROTHROMBIN TIME: 76.8 SEC
PS: 10
RBC # BLD AUTO: 3.09 M/UL
RBC # BLD AUTO: 3.23 M/UL
RBC # BLD AUTO: 3.34 M/UL
RBC # BLD AUTO: 3.37 M/UL
RBC #/AREA URNS AUTO: 20 /HPF (ref 0–4)
SAMPLE: ABNORMAL
SITE: ABNORMAL
SODIUM SERPL-SCNC: 139 MMOL/L
SODIUM SERPL-SCNC: 141 MMOL/L
SODIUM SERPL-SCNC: 142 MMOL/L
SODIUM UR-SCNC: 21 MMOL/L
SP GR UR STRIP: 1.01 (ref 1–1.03)
SP02: 100
SQUAMOUS #/AREA URNS AUTO: 1 /HPF
TARGETS BLD QL SMEAR: ABNORMAL
URN SPEC COLLECT METH UR: ABNORMAL
UROBILINOGEN UR STRIP-ACNC: NEGATIVE EU/DL
UUN UR-MCNC: 422 MG/DL
VT: 500
WBC # BLD AUTO: 10.23 K/UL
WBC # BLD AUTO: 9.47 K/UL
WBC # BLD AUTO: 9.59 K/UL
WBC # BLD AUTO: 9.94 K/UL
WBC #/AREA URNS AUTO: 8 /HPF (ref 0–5)
WBC CLUMPS UR QL AUTO: ABNORMAL

## 2017-08-10 PROCEDURE — 97164 PT RE-EVAL EST PLAN CARE: CPT

## 2017-08-10 PROCEDURE — 94003 VENT MGMT INPAT SUBQ DAY: CPT

## 2017-08-10 PROCEDURE — 63600175 PHARM REV CODE 636 W HCPCS: Performed by: STUDENT IN AN ORGANIZED HEALTH CARE EDUCATION/TRAINING PROGRAM

## 2017-08-10 PROCEDURE — 85610 PROTHROMBIN TIME: CPT | Mod: 91

## 2017-08-10 PROCEDURE — 63600175 PHARM REV CODE 636 W HCPCS: Performed by: THORACIC SURGERY (CARDIOTHORACIC VASCULAR SURGERY)

## 2017-08-10 PROCEDURE — 93750 INTERROGATION VAD IN PERSON: CPT | Mod: 77,,, | Performed by: THORACIC SURGERY (CARDIOTHORACIC VASCULAR SURGERY)

## 2017-08-10 PROCEDURE — 82570 ASSAY OF URINE CREATININE: CPT

## 2017-08-10 PROCEDURE — 81001 URINALYSIS AUTO W/SCOPE: CPT

## 2017-08-10 PROCEDURE — 83605 ASSAY OF LACTIC ACID: CPT

## 2017-08-10 PROCEDURE — 99233 SBSQ HOSP IP/OBS HIGH 50: CPT | Mod: 24,,, | Performed by: THORACIC SURGERY (CARDIOTHORACIC VASCULAR SURGERY)

## 2017-08-10 PROCEDURE — 63600175 PHARM REV CODE 636 W HCPCS: Performed by: ANESTHESIOLOGY

## 2017-08-10 PROCEDURE — C9113 INJ PANTOPRAZOLE SODIUM, VIA: HCPCS | Performed by: STUDENT IN AN ORGANIZED HEALTH CARE EDUCATION/TRAINING PROGRAM

## 2017-08-10 PROCEDURE — 80053 COMPREHEN METABOLIC PANEL: CPT | Mod: 91

## 2017-08-10 PROCEDURE — 25000003 PHARM REV CODE 250: Performed by: THORACIC SURGERY (CARDIOTHORACIC VASCULAR SURGERY)

## 2017-08-10 PROCEDURE — 25000003 PHARM REV CODE 250: Performed by: STUDENT IN AN ORGANIZED HEALTH CARE EDUCATION/TRAINING PROGRAM

## 2017-08-10 PROCEDURE — 63600175 PHARM REV CODE 636 W HCPCS: Performed by: NURSE PRACTITIONER

## 2017-08-10 PROCEDURE — 27000248 HC VAD-ADDITIONAL DAY

## 2017-08-10 PROCEDURE — 99900035 HC TECH TIME PER 15 MIN (STAT)

## 2017-08-10 PROCEDURE — 99900026 HC AIRWAY MAINTENANCE (STAT)

## 2017-08-10 PROCEDURE — 85610 PROTHROMBIN TIME: CPT

## 2017-08-10 PROCEDURE — 85025 COMPLETE CBC W/AUTO DIFF WBC: CPT | Mod: 91

## 2017-08-10 PROCEDURE — 84300 ASSAY OF URINE SODIUM: CPT

## 2017-08-10 PROCEDURE — 84540 ASSAY OF URINE/UREA-N: CPT

## 2017-08-10 PROCEDURE — 97110 THERAPEUTIC EXERCISES: CPT

## 2017-08-10 PROCEDURE — 93750 INTERROGATION VAD IN PERSON: CPT | Mod: ,,, | Performed by: INTERNAL MEDICINE

## 2017-08-10 PROCEDURE — 99233 SBSQ HOSP IP/OBS HIGH 50: CPT | Mod: GC,,, | Performed by: SURGERY

## 2017-08-10 PROCEDURE — 82803 BLOOD GASES ANY COMBINATION: CPT

## 2017-08-10 PROCEDURE — P9045 ALBUMIN (HUMAN), 5%, 250 ML: HCPCS | Performed by: STUDENT IN AN ORGANIZED HEALTH CARE EDUCATION/TRAINING PROGRAM

## 2017-08-10 PROCEDURE — A4216 STERILE WATER/SALINE, 10 ML: HCPCS | Performed by: STUDENT IN AN ORGANIZED HEALTH CARE EDUCATION/TRAINING PROGRAM

## 2017-08-10 PROCEDURE — 85730 THROMBOPLASTIN TIME PARTIAL: CPT

## 2017-08-10 PROCEDURE — 83735 ASSAY OF MAGNESIUM: CPT

## 2017-08-10 PROCEDURE — 37799 UNLISTED PX VASCULAR SURGERY: CPT

## 2017-08-10 PROCEDURE — A4216 STERILE WATER/SALINE, 10 ML: HCPCS | Performed by: THORACIC SURGERY (CARDIOTHORACIC VASCULAR SURGERY)

## 2017-08-10 PROCEDURE — 83615 LACTATE (LD) (LDH) ENZYME: CPT

## 2017-08-10 PROCEDURE — 99233 SBSQ HOSP IP/OBS HIGH 50: CPT | Mod: ,,, | Performed by: NURSE PRACTITIONER

## 2017-08-10 PROCEDURE — 99233 SBSQ HOSP IP/OBS HIGH 50: CPT | Mod: ,,, | Performed by: INTERNAL MEDICINE

## 2017-08-10 PROCEDURE — 20000000 HC ICU ROOM

## 2017-08-10 PROCEDURE — P9045 ALBUMIN (HUMAN), 5%, 250 ML: HCPCS | Performed by: NURSE PRACTITIONER

## 2017-08-10 PROCEDURE — 27000221 HC OXYGEN, UP TO 24 HOURS

## 2017-08-10 PROCEDURE — 63600367 HC NITRIC OXIDE PER HOUR

## 2017-08-10 PROCEDURE — 84100 ASSAY OF PHOSPHORUS: CPT

## 2017-08-10 RX ORDER — ALBUMIN HUMAN 50 G/1000ML
25 SOLUTION INTRAVENOUS ONCE
Status: COMPLETED | OUTPATIENT
Start: 2017-08-10 | End: 2017-08-10

## 2017-08-10 RX ORDER — FUROSEMIDE 10 MG/ML
10 INJECTION INTRAMUSCULAR; INTRAVENOUS ONCE
Status: COMPLETED | OUTPATIENT
Start: 2017-08-10 | End: 2017-08-10

## 2017-08-10 RX ORDER — POTASSIUM CHLORIDE 14.9 MG/ML
20 INJECTION INTRAVENOUS ONCE
Status: COMPLETED | OUTPATIENT
Start: 2017-08-10 | End: 2017-08-10

## 2017-08-10 RX ADMIN — INSULIN ASPART 2 UNITS: 100 INJECTION, SOLUTION INTRAVENOUS; SUBCUTANEOUS at 07:08

## 2017-08-10 RX ADMIN — CEFEPIME HYDROCHLORIDE 1 G: 1 INJECTION, SOLUTION INTRAVENOUS at 09:08

## 2017-08-10 RX ADMIN — POTASSIUM CHLORIDE 20 MEQ: 200 INJECTION, SOLUTION INTRAVENOUS at 10:08

## 2017-08-10 RX ADMIN — Medication 3 ML: at 06:08

## 2017-08-10 RX ADMIN — FLUCONAZOLE 400 MG: 2 INJECTION INTRAVENOUS at 09:08

## 2017-08-10 RX ADMIN — SODIUM CHLORIDE, PRESERVATIVE FREE 10 ML: 5 INJECTION INTRAVENOUS at 11:08

## 2017-08-10 RX ADMIN — DEXTROSE 8 MG/HR: 50 INJECTION, SOLUTION INTRAVENOUS at 04:08

## 2017-08-10 RX ADMIN — FENTANYL CITRATE 50 MCG: 50 INJECTION INTRAMUSCULAR; INTRAVENOUS at 10:08

## 2017-08-10 RX ADMIN — FENTANYL CITRATE 50 MCG: 50 INJECTION INTRAMUSCULAR; INTRAVENOUS at 02:08

## 2017-08-10 RX ADMIN — FENTANYL CITRATE 50 MCG: 50 INJECTION INTRAMUSCULAR; INTRAVENOUS at 03:08

## 2017-08-10 RX ADMIN — ALBUMIN (HUMAN) 25 G: 12.5 SOLUTION INTRAVENOUS at 06:08

## 2017-08-10 RX ADMIN — DEXTROSE 8 MG/HR: 50 INJECTION, SOLUTION INTRAVENOUS at 09:08

## 2017-08-10 RX ADMIN — Medication 10 ML: at 12:08

## 2017-08-10 RX ADMIN — PROPOFOL 35 MCG/KG/MIN: 10 INJECTION, EMULSION INTRAVENOUS at 12:08

## 2017-08-10 RX ADMIN — DOPAMINE HYDROCHLORIDE IN DEXTROSE 4 MCG/KG/MIN: 1.6 INJECTION, SOLUTION INTRAVENOUS at 08:08

## 2017-08-10 RX ADMIN — Medication 3 ML: at 01:08

## 2017-08-10 RX ADMIN — FUROSEMIDE 10 MG/HR: 10 INJECTION, SOLUTION INTRAMUSCULAR; INTRAVENOUS at 10:08

## 2017-08-10 RX ADMIN — Medication 10 ML: at 06:08

## 2017-08-10 RX ADMIN — FENTANYL CITRATE 50 MCG: 50 INJECTION INTRAMUSCULAR; INTRAVENOUS at 09:08

## 2017-08-10 RX ADMIN — PROPOFOL 25 MCG/KG/MIN: 10 INJECTION, EMULSION INTRAVENOUS at 05:08

## 2017-08-10 RX ADMIN — FUROSEMIDE 10 MG: 10 INJECTION, SOLUTION INTRAMUSCULAR; INTRAVENOUS at 09:08

## 2017-08-10 RX ADMIN — CEFEPIME HYDROCHLORIDE 1 G: 1 INJECTION, SOLUTION INTRAVENOUS at 07:08

## 2017-08-10 RX ADMIN — EPINEPHRINE 0.04 MCG/KG/MIN: 1 INJECTION PARENTERAL at 05:08

## 2017-08-10 RX ADMIN — Medication 10 ML: at 11:08

## 2017-08-10 RX ADMIN — CALCIUM GLUCONATE: 94 INJECTION, SOLUTION INTRAVENOUS at 10:08

## 2017-08-10 RX ADMIN — FUROSEMIDE 10 MG/HR: 10 INJECTION, SOLUTION INTRAMUSCULAR; INTRAVENOUS at 09:08

## 2017-08-10 RX ADMIN — Medication 10 ML: at 05:08

## 2017-08-10 RX ADMIN — DEXTROSE 8 MG/HR: 50 INJECTION, SOLUTION INTRAVENOUS at 02:08

## 2017-08-10 RX ADMIN — ALBUMIN (HUMAN) 25 G: 12.5 SOLUTION INTRAVENOUS at 02:08

## 2017-08-10 RX ADMIN — VASOPRESSIN 0.04 UNITS/MIN: 20 INJECTION INTRAVENOUS at 04:08

## 2017-08-10 NOTE — HPI
Mr. Will is a 68 yo AAM with PMHx of NICM, HTN, HLD, and CKD III who presented to the hospital on 7/18 after syncopal episode at home.  He underwent LVAD placement on 7/27.  Labs that day showed a spike in his SCr to 1.5, but he quickly returned to baseline (1.2-1.3) and stayed stable until 08/01 when he increased to 1.5 and has steadily remained above baseline since, up to 2.3 on consult.  He has remained on lasix infusion with intermittent dosages of diuril to aid in diuresis.  On 08/3, there was a reported decrease in LVAD flows which resolved with decrease in lasix infusion and administration of 1 unit of PRBCs, likely causing decrease renal perfusion leading to an ischemic event.  He continues with adequate UOP on lasix gtt. He was transferred to the floor from ICU on 08/08 and was noted to become hypotensive with a doppler pressure of 58, since been requiring pressors for BP support.  Nephrology was consulted for INDIRA and decreased UOP.

## 2017-08-10 NOTE — PROGRESS NOTES
[facility]  Heart Transplant  Progress Note    Patient Name: Suman Hayden  MRN: [id number]  Admission Date: 7/18/2017  Hospital Length of Stay: 23 days  Attending Physician: Sunny Downing MD  Primary Care Provider: Joe Ernst MD  Principal Problem:Acute on chronic combined systolic and diastolic heart failure    Subjective:     Interval History: patient intubated and sedated this morning, wife at bedside     Continuous Infusions:   amiodarone Stopped (08/08/17 1500)    DOBUTamine 5 mcg/kg/min (08/09/17 0126)    DOPamine 4 mcg/kg/min (08/10/17 1100)    epinephrine infusion 0.04 mcg/kg/min (08/10/17 1100)    furosemide (LASIX) 1 mg/mL infusion (titrating) 10 mg/hr (08/10/17 1100)    nitric oxide gas      norepinephrine bitartrate-D5W Stopped (08/09/17 1200)    pantoprazole 40 mg in dextrose 5 % 100 mL infusion (ready to mix system) 8 mg/hr (08/10/17 1100)    propofol 10 mcg/kg/min (08/10/17 1100)    TPN ADULT CENTRAL LINE CUSTOM 50 mL/hr at 08/10/17 0900    TPN ADULT CENTRAL LINE CUSTOM      vasopressin (PITRESSIN) infusion 0.03 Units/min (08/10/17 1100)     Scheduled Meds:   ceFEPime (MAXIPIME) IVPB  1 g Intravenous Q12H    fluconazole (DIFLUCAN) IVPB  400 mg Intravenous Q24H    sodium chloride 0.9%  10 mL Intravenous Q6H    sodium chloride 0.9%  3 mL Intravenous Q8H     PRN Meds:sodium chloride, acetaminophen, albumin human 5%, albuterol sulfate, bisacodyl, dextrose 50%, fentaNYL, glucagon (human recombinant), hydrALAZINE, insulin aspart, ondansetron, Flushing PICC Protocol **AND** sodium chloride 0.9% **AND** sodium chloride 0.9%    Review of patient's allergies indicates:  No Known Allergies  Objective:     Vital Signs (Most Recent):  Temp: 99.5 °F (37.5 °C) (08/10/17 1100)  Pulse: 80 (08/10/17 1100)  Resp: (!) 21 (08/10/17 1100)  BP: (!) 68/0 (08/10/17 1100)  SpO2: 100 % (08/10/17 1100) Vital Signs (24h Range):  Temp:  [98.6 °F (37 °C)-100 °F (37.8 °C)] 99.5 °F (37.5  °C)  Pulse:  [] 80  Resp:  [13-25] 21  SpO2:  [99 %-100 %] 100 %  BP: (64-78)/(0) 68/0  Arterial Line BP: (67-88)/(55-70) 71/55     Weight: 86.5 kg (190 lb 11.2 oz)  Body mass index is 29 kg/m².      Intake/Output Summary (Last 24 hours) at 08/10/17 1142  Last data filed at 08/10/17 1100   Gross per 24 hour   Intake             3362 ml   Output             2280 ml   Net             1082 ml       Hemodynamic Parameters:           Physical Exam   Constitutional: He is oriented to person, place, and time. He appears well-developed and well-nourished. No distress. He is sedated and intubated.   HENT:   Head: Normocephalic and atraumatic.   Eyes: EOM are normal. Pupils are equal, round, and reactive to light.   Neck: Normal range of motion. Neck supple. No JVD present.   Cardiovascular: Normal rate and regular rhythm.  Exam reveals no gallop and no friction rub.    No murmur heard.  + LVAD hum    Pulmonary/Chest: Effort normal and breath sounds normal. He is intubated. No respiratory distress. He has no wheezes. He has no rales.   Abdominal: Soft. He exhibits no distension. There is no tenderness. There is no guarding.   Musculoskeletal: Normal range of motion. He exhibits no edema.   Neurological: He is oriented to person, place, and time. No cranial nerve deficit. Coordination normal.   Skin: Skin is warm and dry. He is not diaphoretic. No erythema.   Nursing note and vitals reviewed.      Significant Labs:  CBC:    Recent Labs  Lab 08/10/17  0501   WBC 9.94   RBC 3.34*   HGB 9.1*   HCT 26.8*      MCV 80*   MCH 27.2   MCHC 34.0     BNP:    Recent Labs  Lab 08/09/17  0349   BNP 1,232*  1,232*     CMP:    Recent Labs  Lab 08/10/17  0501   *   CALCIUM 7.4*   ALBUMIN 2.7*   PROT 5.2*      K 3.6   CO2 23      BUN 61*   CREATININE 2.3*   ALKPHOS 67   ALT 86*   *   BILITOT 2.6*      Coagulation:     Recent Labs  Lab 08/10/17  0501 08/10/17  0638   INR 6.4* 7.3*   APTT 58.8*  --       LDH:    Recent Labs  Lab 08/08/17  0512 08/09/17  0349 08/10/17  0501    335* 319*     Microbiology:  Microbiology Results (last 7 days)     Procedure Component Value Units Date/Time    Urine culture [[id number]] Collected:  08/09/17 0725    Order Status:  Completed Specimen:  Urine from Urine, Catheterized Updated:  08/10/17 1128     Urine Culture, Routine No growth    Blood culture [[id number]] Collected:  08/09/17 0813    Order Status:  Completed Specimen:  Blood from Line, Arterial, Right Updated:  08/10/17 1113     Blood Culture, Routine Gram stain clement bottle: Gram negative rods      Blood Culture, Routine Results called to and read back by: Emma York RN 08/10/2017  11:13    Blood culture [[id number]] Collected:  08/09/17 0733    Order Status:  Completed Specimen:  Blood from Peripheral, Antecubital, Left Updated:  08/10/17 1022     Blood Culture, Routine No Growth to date     Blood Culture, Routine No Growth to date    Blood culture [[id number]] Collected:  08/04/17 1653    Order Status:  Completed Specimen:  Blood from Peripheral, Wrist, Right Updated:  08/09/17 2012     Blood Culture, Routine No growth after 5 days.    Blood culture [[id number]] Collected:  08/04/17 1654    Order Status:  Completed Specimen:  Blood from Peripheral, Wrist, Left Updated:  08/09/17 2012     Blood Culture, Routine No growth after 5 days.    Culture, Respiratory with Gram Stain [[id number]] Collected:  08/09/17 0723    Order Status:  Completed Specimen:  Respiratory from Endotracheal Aspirate Updated:  08/09/17 1252     Gram Stain (Respiratory) <10 epithelial cells per low power field.     Gram Stain (Respiratory) Few WBC's     Gram Stain (Respiratory) Many Gram negative rods     Gram Stain (Respiratory) Few Gram positive rods     Gram Stain (Respiratory) Few Gram positive cocci    Urine culture [[id number]] Collected:  08/04/17 1655    Order Status:  Completed Specimen:  Urine from Urine, Catheterized Updated:   08/05/17 2116     Urine Culture, Routine No growth          I have reviewed all pertinent labs within the past 24 hours.    Estimated Creatinine Clearance: 33.3 mL/min (based on Cr of 2.3).    Diagnostic Results:  I have reviewed and interpreted all pertinent imaging results/findings within the past 24 hours.    Assessment and Plan:     Atrial fibrillation    - c/w ac  - c/w amio gtt        LVAD (left ventricular assist device) present    - LVAD HM III. Placed this admit 7/27/17  - CTS Primary  - chest closure (7/28/17) and extubated (7/29/17)  - Speed 5200  - LDH stable  - INR supratherputic now, hold coumadin   -weaning pressors now. Will extubate once INR lower   -Echo did not show pericardial effusion                  Urine culture positive    - now on broad spectrum antibiotics          Atrial tachycardia    - [id number] - 3  - c/w amiodarone  - c/w heparin gtt        AICD discharge    - appropriate in the setting of VT aggravated by underlying AT/AFL (earlier during the admission)  - 7/28 - setting of AF undersense - device reprogrammed to VVI 80  - tachy therapy turned off  - Was on amio gtt. Now on PO Amio  - EP planning to do lead revision         Hepatitis B core antibody positive since 2012    - will defer liver biopsy as CTS not requiring it  - ID consult cleared the patient for sx  - case discussed with hepatology, low suspicion for liver involvement        V-tach    - s/p amiodarone reload - now on amio gtt        Hyperlipidemia    - c/w pravastatin            MELISSA Long  Heart Transplant  [facility]

## 2017-08-10 NOTE — NURSING TRANSFER
Nursing Transfer Note      8/09/2017 0300    Transfer To: SICU [id number]    Transfer via bed    Transfer with  to O2, cardiac monitoring    Transported by Maksim RN SICU charge nurse, Dr. Espinoza, Leigh De La Fuente, RN    Medicines sent: Dobutamine gtt infusing @ 5mcg/kg/min    Chart send with patient: Yes    Notified: spouse at bedside    Patient reassessed at: upon arrival to 60 08- 0305    Upon arrival to floor: cardiac monitor applied

## 2017-08-10 NOTE — PROGRESS NOTES
Daily E and M and VAD Interrogation Note    Reason for Visit:  Patient is seen in follow up for management of:  [] HeartMate II  [] Heartware [] Total artificial heart       [] ECMO           [x] Other - HM III     Interval History:  [] Interval history unobtainable due to intubation.  The [x] implant/[] explant date was 7/27/17    Events - No acute events o/n. Decreasing vasopressor requirements this AM. Intubated on minimal vent settings. Adequate UOP off lasix. Worsening coagulopathy.       Review of Systems:   Negative except as above.      Medications:  Current Facility-Administered Medications   Medication Dose Route Frequency Provider Last Rate Last Dose    0.9%  NaCl infusion (for blood administration)   Intravenous Q24H PRN Shayne Espinoza MD        acetaminophen tablet 650 mg  650 mg Oral Q6H PRN Shayne Espinoza MD   650 mg at 08/08/17 2122    albumin human 5% bottle 500 mL  500 mL Intravenous PRN Shayne Espinoza MD   500 mL at 08/09/17 0700    albuterol nebulizer solution 2.5 mg  2.5 mg Nebulization Q4H PRN Shayne Espinoza MD        amiodarone 360 mg/200 mL (1.8 mg/mL) infusion  0.5 mg/min Intravenous Continuous Shayne Espinoza MD   Stopped at 08/08/17 1500    bisacodyl suppository 10 mg  10 mg Rectal Daily PRN Shayne Espinoza MD   10 mg at 08/06/17 2036    cefepime in dextrose 5 % 1 gram/50 mL IVPB 1 g  1 g Intravenous Q12H Shayne Espinoza  mL/hr at 08/10/17 0757 1 g at 08/10/17 0757    dextrose 50% injection 12.5 g  12.5 g Intravenous PRN Charbel Ritter MD        DOBUtamine 1000 mg in D5W 250 mL infusion (premix non-titrating)  5 mcg/kg/min (Dosing Weight) Intravenous Continuous Shayne Espinoza MD 6 mL/hr at 08/09/17 0126 5 mcg/kg/min at 08/09/17 0126    DOPamine 400 mg in dextrose 5 % 250 mL infusion (premix) (NON-TITRATING)  4 mcg/kg/min (Dosing Weight) Intravenous Continuous Shayne Espinoza MD 12 mL/hr at 08/10/17 0900 4 mcg/kg/min at 08/10/17  0900    EPINEPHrine (ADRENALIN) 4 mg in sodium chloride 0.9% 250 mL infusion  0.02 mcg/kg/min (Dosing Weight) Intravenous Continuous Shayne Espinoza MD 12 mL/hr at 08/10/17 0900 0.04 mcg/kg/min at 08/10/17 0900    fentaNYL injection 50 mcg  50 mcg Intravenous Q4H PRN Shayne Espinoza MD   50 mcg at 08/10/17 0214    fluconazole (DIFLUCAN) IVPB 400 mg 200 mL  400 mg Intravenous Q24H Shayne Espinoza  mL/hr at 08/10/17 0924 400 mg at 08/10/17 0924    furosemide (LASIX) 250 mg in sodium chloride 0.9 % 250 mL infusion (titrating)  10 mg/hr Intravenous Continuous Shayne Espinoza MD 10 mL/hr at 08/10/17 0919 10 mg/hr at 08/10/17 0919    glucagon (human recombinant) injection 1 mg  1 mg Intramuscular PRN Charbel Ritter MD        hydrALAZINE injection 10 mg  10 mg Intravenous Q6H PRN Shayne Espinoza MD   10 mg at 08/08/17 1509    insulin aspart pen 0-5 Units  0-5 Units Subcutaneous Q4H PRN Charbel Ritter MD   2 Units at 08/10/17 0751    nitric oxide gas Gas 5 ppm  5 ppm Inhalation Continuous Sunny Downing MD   5 ppm at 08/09/17 1200    norepinephrine 4 mg in dextrose 5% 250 mL infusion (premix) (titrating)  0.02 mcg/kg/min (Dosing Weight) Intravenous Continuous Shayne Espinoza MD   Stopped at 08/09/17 1200    ondansetron injection 4 mg  4 mg Intravenous Q6H PRN Shayne Espinoza MD   4 mg at 08/08/17 2112    pantoprazole 40 mg in dextrose 5 % 100 mL infusion (ready to mix system)  8 mg/hr Intravenous Continuous Nicole Vazquez MD 20 mL/hr at 08/10/17 0900 8 mg/hr at 08/10/17 0900    propofol (DIPRIVAN) 10 mg/mL infusion  5 mcg/kg/min (Dosing Weight) Intravenous Continuous Shayne Espinoza MD 4.8 mL/hr at 08/10/17 0900 10 mcg/kg/min at 08/10/17 0900    sodium chloride 0.9% flush 10 mL  10 mL Intravenous Q6H Sunny Downing MD   10 mL at 08/10/17 0600    And    sodium chloride 0.9% flush 10 mL  10 mL Intravenous PRN Sunny Downing MD        sodium chloride 0.9% flush 3  mL  3 mL Intravenous Q8H Shayne Espinoza MD   3 mL at 08/10/17 0600    TPN ADULT CENTRAL LINE CUSTOM   Intravenous Continuous Charbel Ritter MD 50 mL/hr at 08/10/17 0900      TPN ADULT CENTRAL LINE CUSTOM   Intravenous Continuous Shayne Espinoza MD        vasopressin (PITRESSIN) 100 Units in dextrose 5 % 100 mL infusion  0.03 Units/min Intravenous Continuous Shayne Espinoza MD 1.8 mL/hr at 08/10/17 0900 0.03 Units/min at 08/10/17 0900     Physical Examination:  Vital Signs:   Vitals:    08/10/17 0930   BP:    Pulse: 79   Resp: (!) 21   Temp:      Cardiovascular:  [x] Regular rate and rhythm [] Irregular []  None (MATT) []  Other  []  No edema [x]  Edema present  [x]  Clear to auscultation  []  Rales to []  Coarse  []  No rales but   [] Pedal Pulses absent  [x]  Pulses  + throughout  Skin:  Incision is [x]  Clean, dry and intact.  []  Other   Sternum:  [x]  Stable []  Unstable  Driveline(s):   [x]  Clean, dry and intact. []  Other     Labs:  BMP  Lab Results   Component Value Date     08/10/2017    K 3.6 08/10/2017     08/10/2017    CO2 23 08/10/2017    BUN 61 (H) 08/10/2017    CREATININE 2.3 (H) 08/10/2017    CALCIUM 7.4 (L) 08/10/2017    ANIONGAP 15 08/10/2017    ESTGFRAFRICA 32.7 (A) 08/10/2017    EGFRNONAA 28.3 (A) 08/10/2017     Magnesium   Date Value Ref Range Status   08/10/2017 2.0 1.6 - 2.6 mg/dL Final     Lab Results   Component Value Date    WBC 9.94 08/10/2017    HGB 9.1 (L) 08/10/2017    HCT 26.8 (L) 08/10/2017    MCV 80 (L) 08/10/2017     08/10/2017     Lab Results   Component Value Date    INR 7.3 (HH) 08/10/2017    INR 6.4 (HH) 08/10/2017    INR 3.3 (H) 08/09/2017    INR 3.3 (H) 08/09/2017     BNP   Date Value Ref Range Status   08/09/2017 1,232 (H) 0 - 99 pg/mL Final     Comment:     Values of less than 100 pg/ml are consistent with non-CHF populations.   08/09/2017 1,232 (H) 0 - 99 pg/mL Final     Comment:     Values of less than 100 pg/ml are consistent with  non-CHF populations.   08/07/2017 1,361 (H) 0 - 99 pg/mL Final     Comment:     Values of less than 100 pg/ml are consistent with non-CHF populations.     LD   Date Value Ref Range Status   08/10/2017 319 (H) 110 - 260 U/L Final     Comment:     Results are increased in hemolyzed samples.   08/09/2017 335 (H) 110 - 260 U/L Final     Comment:     Results are increased in hemolyzed samples.   08/08/2017 210 110 - 260 U/L Final     Comment:     Results are increased in hemolyzed samples.     X-Rays:  [x]  I reviewed today's Chest x-ray    Procedure:  Device Interrogation including analysis of device parameters.  Current Settings   [x]  Ventricular Assist Device  []  Total Artificial Heart interrogated  Review of device function is [x]  Stable []  Other   TXP LVAD INTERROGATIONS 8/10/2017 8/10/2017 8/10/2017 8/10/2017 8/10/2017 8/10/2017 8/10/2017   Type HeartMate3 HeartMate3 HeartMate3 HeartMate3 HeartMate3 HeartMate3 HeartMate3   Flow 4.2 4.1 4.3 4.1 4 4.1 4.1   Speed 5000 5000 5000 5000 5000 5000 5000   PI 3.5 3.5 3.5 3.4 3.4 3.3 3.4   Power ([place]) 3.3 3.3 3.3 3.3 3.3 3.3 3.3   LSL 4600 4600 4600 - - - 4600   Pulsatility No Pulse No Pulse No Pulse - - - No Pulse   Some recent data might be hidden       Assessment:  [x]  Primary Cardiomyopathy []  Congestive Heart Failure   []  Atrial Fibrillation []  Ventricular Tachycardia   []  Aftercare cardiac device []  Long term (current) use of anticoagulants   []  Ventilator-associated pneumonia []  Pneumonia viral, unspecified   []  Pneumonia, bacterial, unspecified []  Pneumonia, organism unspecified   []  Hemorrhage of GI tract, unspecified    []  Nosebleed []  Driveline infection   []  Infection VAD device []  New onset of    []        Plan:  [x]  Interval history obtained from ICU attending team member during rounding today  []  VAD/MATT teaching performed with patient  []  Mobilization / Physical Therapy ongoing  []  Anticoagulation []  Ongoing []  Held  []  Studies  ordered  []   To OR today for closure.       Total time spent was 30 minutes.  Of which more than 50 percent of the care dominated counseling and coordinating care with different team members. The VAD was interrogated and all parameters were WNL and no significant findings were found in the history. All these findings are documented in the note above.    Neuro:  - Intubated, sedated  - Alert and responsive when aroused   - Wean sedation as tolerated     Resp:  - Intubated: Vent Mode: Spont/T  Oxygen Concentration (%):  [50] 50  Resp Rate Total:  [15 br/min-23 br/min] 16 br/min  Vt Set:  [500 mL] 500 mL  PEEP/CPAP:  [5 cmH20] 5 cmH20  Pressure Support:  [10 cmH20] 10 cmH20  Mean Airway Pressure:  [9.3 cmH20-10 cmH20] 9.3 cmH20  - Minimize vent settings  - Possible aspiration event causing sepsis - resp cultures sent and empiric abx started.     CV:  - HDS; LVAD numbers stable  -   - Amio gtt stopped  - Dobutamine off  - Dopamine, epi, levo, vaso drips on   - Hold  given GI bleed    Heme:  - Hgb/Hct 9/27  - Continue to trend  - INR7.3 on repeat this AM - Hold Coumadin  - Heparin off  ID:  - afebrile  - WBC 9.9  - Likely aspiration pneumonia - empiric cefepime and diflucan   - continue to monitor     Renal:  - Singer in place  - Strict I/Os   - Adequate UOP  - Lasix gtt at 10 mg/hr  - Cr 2.3    FEN/GI:  - Strict NPO  - Stop bowel regimen and all other PO meds given GI bleed  - Protonix gtt  - Replace lytes PRN    Endo:  - Endocrine following, appreciate assistance  - Accuchecks  - Insulin ssi    Dispo:  - Continue ICU care.       Shayne Espinoza MD  General Surgery PGY-2  Pager: [phone]    Cardiac Surgery Attending E and M (VAD) Note along with VAD Interrogation    I have seen and examined the patient and agree with the findings above    I also reviewed the patients clinical course and:  [x]  Hemodynamic & Respiratory paramters  [x]  Laboratory Data  [x]  Radiological studies     VAD Interrogated [x]       VAD Function is normal. Changes made []  None [x]        Interrogation of Ventricular assist device was performed with physician analysis of device parameters and review of device function. I have personally reviewed the interrogation findings and agree with findings as stated

## 2017-08-10 NOTE — PROCEDURES
"Suman Hayden is a 67 y.o. male patient.    Temp: 99.5 °F (37.5 °C) (08/10/17 1100)  Pulse: 80 (08/10/17 1100)  Resp: (!) 21 (08/10/17 1100)  BP: (!) 68/0 (08/10/17 1100)  SpO2: 100 % (08/10/17 1100)  Weight: 86.5 kg (190 lb 11.2 oz) (08/10/17 0438)  Height: 5' 8" (172.7 cm) (08/08/17 1100)  Mallampati Scale: Class III  ASA Classification: Class 3    Central Line  Date/Time: 8/9/2017 4:00 AM  Location procedure was performed: [facility] CARDIOTHORACIC SURGERY  Performed by: HAYDE VARGAS  Pre-operative Diagnosis: hypotension  Post-operative diagnosis: hypotension  Consent Done: Emergent Situation  Time out: Immediately prior to procedure a "time out" was called to verify the correct patient, procedure, equipment, support staff and site/side marked as required.  Indications: med administration and vascular access  Anesthesia: local infiltration    Anesthesia:  Local Anesthetic: lidocaine 1% without epinephrine  Anesthetic total: 10 mL  Description of findings: right-sided 12 fr. triple-lumen catheter placed without issue   Preparation: skin prepped with ChloraPrep  Sterile barriers: all five maximum sterile barriers used - cap, mask, sterile gown, sterile gloves, and large sterile sheet  Hand hygiene: hand hygiene performed prior to central venous catheter insertion  Location details: right internal jugular  Catheter size: 12 Fr  Catheter Length: 16cm    Ultrasound guidance: yes  Vessel Caliber: large, patent, compressibility normal  Needle advanced into vessel with real time Ultrasound guidance.  Guidewire confirmed in vessel.  Sterile sheath used.  Number of attempts: 1  Assessment: placement verified by x-ray  Complications: none  Estimated blood loss (mL): 5  Specimens: No  Implants: No  Post-procedure: line sutured,  sterile dressing applied,  chlorhexidine patch and blood return through all ports  Complications: No          Hayde Vargas  8/10/2017  "

## 2017-08-10 NOTE — PLAN OF CARE
Problem: Physical Therapy Goal  Goal: Physical Therapy Goal  Goals to be met by: 17     Patient will increase functional independence with mobility by performin. Supine to sit with MInimal Assistance- not met  2. Sit to supine with MInimal Assistance - not met  3. Sit to stand transfer with Minimal Assistance- not met  4. Bed to chair transfer with Minimal Assistance- not met  5. Gait  x 50 feet with Minimal Assistance. - not met  6. Lower extremity exercise program x15 reps, with supervision, in order to increase LE strength and (I) with functional mobility. - not met      Re-eval performed and goals appropriate. Astrid Whaley, PT 8/10/2017

## 2017-08-10 NOTE — CONSULTS
[facility]  Nephrology  Consult Note    Patient Name: Suman Hayden  MRN: [id number]  Admission Date: 7/18/2017  Hospital Length of Stay: 23 days  Attending Provider: Sunny Downing MD   Primary Care Physician: Joe Ernst MD  Principal Problem:Acute on chronic combined systolic and diastolic heart failure    Inpatient consult to Nephrology  Consult performed by: NIRANJAN LOYA  Consult ordered by: HAYDE VARGAS  Reason for consult: INDIRA on CKD III        Subjective:     HPI: Mr. Will is a 68 yo AAM with PMHx of NICM, HTN, HLD, and CKD III who presented to the hospital on 7/18 after syncopal episode at home.  He underwent LVAD placement on 7/27.  Labs that day showed a spike in his SCr to 1.5, but he quickly returned to baseline (1.2-1.3) and stayed stable until 08/01 when he increased to 1.5 and has steadily remained above baseline since, up to 2.3 on consult.  He has remained on lasix infusion with intermittent dosages of diuril to aid in diuresis.  On 08/31, there was a reported decrease in LVAD flows which resolved with decrease in lasix infusion and administration of 1 unit of PRBCs, likely causing decrease renal perfusion leading to an ischemic event.  He continues with adequate UOP on lasix gtt. He was transferred to the floor from ICU on 08/08 and was noted to become hypotensive with a doppler pressure of 58, since been requiring pressors for BP support.  Since ICU readmission he has been having increase OG tube drainage averaging around 1.5L/24h for last 2 days.  CVP recorded at 9.  Nephrology was consulted for INDIRA and decreased UOP.    No new subjective & objective note has been filed under this hospital service since the last note was generated.    Assessment/Plan:     Acute kidney injury superimposed on CKD    INDIRA on CKD III  -INDIRA appears to be multifactorial, Ischemic ATN from renal hypoperfusion vs. Septic ATN.  -Baseline SCr appears to be around 1.2-1.3.  He has had  multiple INDIRA's in the past, likely 2/2 to poor renal perfusion from cardiomyopathy.  -had initial insult on the day of LVAD surgery on the 27th with a spike in his SCr, but it quickly resolved and returned to baseline.  His SCr stayed stable until the 1st when it increased to 1.5 and has continued to trend up until 2.3 today.   -Signs of decreased perfusion occurred on July 31st with reported drops in LVAD Flows which resolved with decrease in lasix dose and 1 unit of PRBC infusion.   -will repeat UA, UPCR, urine lytes.    -recommend discontinuing lasix gtt.  His CVP is 9 and no signs of overload on exam, and patient is receiving albumin for volume expansion.  He has been having large volumes of OG tube suction.   -currently no acute indication for RRT.  Will continue following patient.                    Alfonzo Medina NP  Nephrology  [facility]  Pager:  [phone]

## 2017-08-10 NOTE — PROCEDURES
TXP LVAD INTERROGATIONS 8/10/2017 8/10/2017 8/10/2017 8/10/2017 8/10/2017 8/10/2017 8/10/2017   Type HeartMate3 HeartMate3 HeartMate3 HeartMate3 HeartMate3 HeartMate3 HeartMate3   Flow 4.2 4.2 4.1 4.3 4.1 4 4.1   Speed 5000 5000 5000 5000 5000 5000 5000   PI 3.5 3.5 3.5 3.5 3.4 3.4 3.3   Power ([place]) 3.3 3.3 3.3 3.3 3.3 3.3 3.3   LSL 4600 4600 4600 4600 - - -   Pulsatility No Pulse No Pulse No Pulse No Pulse - - -   Some recent data might be hidden     VAD sounds HM 3  Non pulsatile  HCT=26.8  Modular cable CDI, no yellow visible  No alarms noted in history since yesterday am    Pt sedated, intubated, but awakes when spoken to.  Wife AAAO. Emotional support provided.  Questions answered to her satisfaction as evidence by verbal acknowledgement.

## 2017-08-10 NOTE — PROGRESS NOTES
Dr. Downing aware that UOP remain 45cc/hr despite lasix gtt at 10mg/hr. MD aware of all VS, Gtts, and CVP of 9. New orders received. Will carryout orders and monitor pt closely

## 2017-08-10 NOTE — PROGRESS NOTES
Dr. Downing at bedside aware MAPs low 60s. MD aware of all VS, UOP, CVP of 12, and gtts. Dopamine increased to 5. 500cc albumin given. Will continue to monitor pt closely

## 2017-08-10 NOTE — PROGRESS NOTES
Dr. Downing and Dr. Espinoza at bedside to round on pt. Aware of all labs including INR of 7.3, VS, CVP, UOP, NG tube output, and gtts currently infusing. Plan of care discussed. Plan to hold off on extubation s/t to elevated INR and inability to place NG tube. Plan to diuresis pt, orders received and carried out. Plan of care discussed with pt and wife at bedside. Will continue to monitor pt closely

## 2017-08-10 NOTE — ASSESSMENT & PLAN NOTE
- LVAD HM III. Placed this admit 7/27/17  - CTS Primary  - chest closure (7/28/17) and extubated (7/29/17)  - Speed 5200  - LDH stable  - INR supratherputic now, hold coumadin   -weaning pressors now. Will extubate once INR lower   -Echo did not show pericardial effusion

## 2017-08-10 NOTE — PROGRESS NOTES
Progress Note  Surgical Intensive Care    Admit Date: 7/18/2017  Post-operative Day: 13 Days Post-Op  Hospital Day: 24    SUBJECTIVE:     Follow-up For:  Procedure(s) (LRB):  CLOSURE-STERNAL WOUND (N/A)  PLACEMENT-NEOPERICARDIUM (N/A)   S/p sternotomy closure 7/28/17    Interval history: No acute events o/n. On dobamine 4, epi 0.04, and levo 0.04. Intubated on minimal vent settings. UOP decreasing with increasing creatinine. Worsening coagulopathy. INR up to 7 this morning on repeat draw.  .    Continuous Infusions:   amiodarone Stopped (08/08/17 1500)    DOBUTamine 5 mcg/kg/min (08/09/17 0126)    DOPamine 4 mcg/kg/min (08/10/17 1000)    epinephrine infusion 0.04 mcg/kg/min (08/10/17 1000)    furosemide (LASIX) 1 mg/mL infusion (titrating) 10 mg/hr (08/10/17 1009)    nitric oxide gas      norepinephrine bitartrate-D5W Stopped (08/09/17 1200)    pantoprazole 40 mg in dextrose 5 % 100 mL infusion (ready to mix system) 8 mg/hr (08/10/17 1000)    propofol 10 mcg/kg/min (08/10/17 1000)    TPN ADULT CENTRAL LINE CUSTOM 50 mL/hr at 08/10/17 0900    TPN ADULT CENTRAL LINE CUSTOM      vasopressin (PITRESSIN) infusion 0.03 Units/min (08/10/17 1000)     Scheduled Meds:   ceFEPime (MAXIPIME) IVPB  1 g Intravenous Q12H    fluconazole (DIFLUCAN) IVPB  400 mg Intravenous Q24H    sodium chloride 0.9%  10 mL Intravenous Q6H    sodium chloride 0.9%  3 mL Intravenous Q8H     PRN Meds:sodium chloride, acetaminophen, albumin human 5%, albuterol sulfate, bisacodyl, dextrose 50%, fentaNYL, glucagon (human recombinant), hydrALAZINE, insulin aspart, ondansetron, Flushing PICC Protocol **AND** sodium chloride 0.9% **AND** sodium chloride 0.9%    Review of patient's allergies indicates:  No Known Allergies    OBJECTIVE:     Vital Signs (Most Recent)  Temp: 98.6 °F (37 °C) (08/10/17 0700)  Pulse: 79 (08/10/17 1030)  Resp: (!) 22 (08/10/17 1030)  BP: (!) 66/0 (08/10/17 0700)  SpO2: 100 % (08/10/17 1030)    Vital Signs Range  (Last 24H):  Temp:  [98.2 °F (36.8 °C)-100 °F (37.8 °C)]   Pulse:  []   Resp:  [13-25]   BP: (64-78)/(0)   SpO2:  [99 %-100 %]   Arterial Line BP: (67-88)/(55-70)     I & O (Last 24H):    Intake/Output Summary (Last 24 hours) at 08/10/17 1046  Last data filed at 08/10/17 1000   Gross per 24 hour   Intake             3862 ml   Output             2275 ml   Net             1587 ml        Physical Exam   Constitutional: He appears well-developed and well-nourished. No distress. He is intubated. He is alert and responding to commands.  HENT:   Head: Normocephalic and atraumatic.   Eyes: Right eye exhibits no discharge. Left eye exhibits no discharge. No scleral icterus.   Neck: Normal range of motion. Neck supple.   Cardiovascular: Intact distal pulses.    LVAD hum present.   Pulmonary/Chest: Effort normal. No respiratory distress.   R and L meds chest tubes in place- minimal serosanguinous drainage.  Abdominal: Soft. He exhibits no distension.   Skin: Skin is warm and dry.     Laboratory (Last 24H):  CBC:    Recent Labs  Lab 08/10/17  0501   WBC 9.94   HGB 9.1*   HCT 26.8*        CMP:    Recent Labs  Lab 08/10/17  0501   CALCIUM 7.4*   ALBUMIN 2.7*   PROT 5.2*      K 3.6   CO2 23      BUN 61*   CREATININE 2.3*   ALKPHOS 67   ALT 86*   *   BILITOT 2.6*       ASSESSMENT/PLAN:     Neuro:  - Intubated  - alert and responding to commands  - sedation for today then wean sedation tomorrow     Resp:  - Intubated:   Vent Mode: Spont/T  Oxygen Concentration (%):  [50] 50  Resp Rate Total:  [15 br/min-23 br/min] 16 br/min  Vt Set:  [500 mL] 500 mL  PEEP/CPAP:  [5 cmH20] 5 cmH20  Pressure Support:  [10 cmH20] 10 cmH20  Mean Airway Pressure:  [9.3 cmH20-10 cmH20] 9.3 cmH20  - Minimize vent settings  - Possible aspiration event causing sepsis - resp cultures sent and empiric abx started.      CV:  - HDS; LVAD numbers stable  - Dopamine, epi, levo, vaso drips on, wean as tolerated   - Hold  given  GI bleed     Heme:  - Hgb/Hct 9/26   - Continue to trend  - INR7.3 on repeat this AM - Hold Coumadin, no plan for Vitamin K now  - Heparin off    ID:  - afebrile  - WBC 9.9  - Likely aspiration pneumonia - empiric cefepime and diflucan   - continue to monitor      Renal:  - Singer in place  - Strict I/Os   - UOP decreasing, continue to monitor closely  - Lasix gtt at 10 mg/hr  - Cr 2.3     FEN/GI:  - Strict NPO  - Stop bowel regimen and all other PO meds given GI bleed  - Protonix gtt  - Replace lytes PRN     Endo:  - Endocrine following  - Accuchecks  - SSI     Dispo:  - Continue ICU care    Vince Murguia PGY-1  [phone]  08/10/2017

## 2017-08-10 NOTE — PROGRESS NOTES
UPDATE    SW to pt's room for update. Pt intubated and sedated. Pt's wife and son at bedside. Pt's family reports coping adequately with no needs at this time. SW providing psychosocial and counseling support, education, resources, and d/c planning as needed. SW continuing to follow and remains available.

## 2017-08-10 NOTE — ASSESSMENT & PLAN NOTE
- appropriate in the setting of VT aggravated by underlying AT/AFL (earlier during the admission)  - 7/28 - setting of AF undersense - device reprogrammed to VVI 80  - tachy therapy turned off  - Was on amio gtt. Now on PO Amio  - EP planning to do lead revision

## 2017-08-10 NOTE — SUBJECTIVE & OBJECTIVE
Past Medical History:   Diagnosis Date    Cardiomyopathy     Hyperlipidemia     Hypertension     Obesity     S/P implantation of automatic cardioverter/defibrillator (AICD)     Ventricular tachycardia        Past Surgical History:   Procedure Laterality Date    CARDIAC CATHETERIZATION      CARDIAC DEFIBRILLATOR PLACEMENT         Review of patient's allergies indicates:  No Known Allergies  Current Facility-Administered Medications   Medication Frequency    0.9%  NaCl infusion (for blood administration) Q24H PRN    acetaminophen tablet 650 mg Q6H PRN    albumin human 5% bottle 500 mL PRN    albuterol nebulizer solution 2.5 mg Q4H PRN    amiodarone 360 mg/200 mL (1.8 mg/mL) infusion Continuous    bisacodyl suppository 10 mg Daily PRN    cefepime in dextrose 5 % 1 gram/50 mL IVPB 1 g Q12H    dextrose 50% injection 12.5 g PRN    DOBUtamine 1000 mg in D5W 250 mL infusion (premix non-titrating) Continuous    DOPamine 400 mg in dextrose 5 % 250 mL infusion (premix) (NON-TITRATING) Continuous    EPINEPHrine (ADRENALIN) 4 mg in sodium chloride 0.9% 250 mL infusion Continuous    fentaNYL injection 50 mcg Q4H PRN    fluconazole (DIFLUCAN) IVPB 400 mg 200 mL Q24H    furosemide (LASIX) 250 mg in sodium chloride 0.9 % 250 mL infusion (titrating) Continuous    glucagon (human recombinant) injection 1 mg PRN    hydrALAZINE injection 10 mg Q6H PRN    insulin aspart pen 0-5 Units Q4H PRN    nitric oxide gas Gas 5 ppm Continuous    norepinephrine 4 mg in dextrose 5% 250 mL infusion (premix) (titrating) Continuous    ondansetron injection 4 mg Q6H PRN    pantoprazole 40 mg in dextrose 5 % 100 mL infusion (ready to mix system) Continuous    propofol (DIPRIVAN) 10 mg/mL infusion Continuous    sodium chloride 0.9% flush 10 mL Q6H    And    sodium chloride 0.9% flush 10 mL PRN    sodium chloride 0.9% flush 3 mL Q8H    TPN ADULT CENTRAL LINE CUSTOM Continuous    TPN ADULT CENTRAL LINE CUSTOM Continuous     vasopressin (PITRESSIN) 100 Units in dextrose 5 % 100 mL infusion Continuous     Family History     None        Social History Main Topics    Smoking status: Never Smoker    Smokeless tobacco: Never Used    Alcohol use No    Drug use: Unknown    Sexual activity: Not on file     Review of Systems   Unable to perform ROS: Intubated     Objective:     Vital Signs (Most Recent):  Temp: 99.1 °F (37.3 °C) (08/10/17 1500)  Pulse: 79 (08/10/17 1445)  Resp: (!) 22 (08/10/17 1445)  BP: (!) 62/0 (08/10/17 1500)  SpO2: 100 % (08/10/17 1445)  O2 Device (Oxygen Therapy): ventilator (08/10/17 1500) Vital Signs (24h Range):  Temp:  [98.6 °F (37 °C)-100 °F (37.8 °C)] 99.1 °F (37.3 °C)  Pulse:  [79-82] 79  Resp:  [13-25] 22  SpO2:  [99 %-100 %] 100 %  BP: (62-78)/(0) 62/0  Arterial Line BP: (68-88)/(54-70) 72/56     Weight: 86.5 kg (190 lb 11.2 oz) (08/10/17 0438)  Body mass index is 29 kg/m².  Body surface area is 2.04 meters squared.    I/O last 3 completed shifts:  In: 8402 [P.O.:180; I.V.:6700; Blood:850; IV Piggyback:250]  Out: 4770 [Urine:1120; Drains:3650]    Physical Exam   Constitutional: He appears well-developed and well-nourished. No distress. He is intubated.   HENT:   Head: Normocephalic and atraumatic.   Eyes: Conjunctivae and EOM are normal. Right eye exhibits no discharge. Left eye exhibits no discharge.   Neck: Neck supple.   Cardiovascular: Normal rate and regular rhythm.    LVAD hum   Pulmonary/Chest: Effort normal and breath sounds normal. He is intubated. No respiratory distress. He has no rales.   Abdominal: Soft. He exhibits no distension.   Musculoskeletal: He exhibits no edema.   Neurological: He is alert.   Skin: Skin is warm and dry. He is not diaphoretic.       Significant Labs:  ABGs:   Recent Labs  Lab 08/10/17  1533   PH 7.530*   PCO2 36.7   HCO3 30.7*   POCSATURATED 100   BE 8     CBC:   Recent Labs  Lab 08/10/17  1140   WBC 10.23   RBC 3.23*   HGB 8.8*   HCT 25.3*      MCV 78*   MCH 27.2    MCHC 34.8     CMP:   Recent Labs  Lab 08/10/17  1140   *   CALCIUM 7.4*   ALBUMIN 2.6*   PROT 5.2*      K 3.8   CO2 25      BUN 67*   CREATININE 2.3*   ALKPHOS 67   ALT 79*   AST 91*   BILITOT 2.5*       Recent Labs  Lab 08/04/17  1655   COLORU [place]   SPECGRAV 1.010   PHUR 5.0   PROTEINUA Negative   BACTERIA Many*   NITRITE Negative   LEUKOCYTESUR 3+*   UROBILINOGEN Negative   HYALINECASTS 12*

## 2017-08-10 NOTE — PROGRESS NOTES
Progress Note  Surgical Intensive Care    Admit Date: 7/18/2017  Post-operative Day: 13 Days Post-Op  Hospital Day: 24    SUBJECTIVE:     Follow-up For:  Procedure(s) (LRB):  CLOSURE-STERNAL WOUND (N/A)  PLACEMENT-NEOPERICARDIUM (N/A)   S/p sternotomy closure 7/18/17    Interval history: brought to ICU due to respiratory distress and hypotension, intubated and sedated. NGT placed with 2L malodorous, angélica colored output and questionable aspiration. LVAD flows remained fairly stable throughout. GLENN at bedside showed normal RV and low volume in LV - LVAD speed reduced with some improvement in pressures. Pt also severely acidotic which improved with bicarb amps and fluid resuscitation. Cultures sent and pt started on broad spectrum abx. Currently with decreasing pressor requirements and minimal vent settings.    Continuous Infusions:   amiodarone Stopped (08/08/17 1500)    DOBUTamine 5 mcg/kg/min (08/09/17 0126)    DOPamine 4 mcg/kg/min (08/10/17 1000)    epinephrine infusion 0.04 mcg/kg/min (08/10/17 1000)    furosemide (LASIX) 1 mg/mL infusion (titrating) 10 mg/hr (08/10/17 1009)    nitric oxide gas      norepinephrine bitartrate-D5W Stopped (08/09/17 1200)    pantoprazole 40 mg in dextrose 5 % 100 mL infusion (ready to mix system) 8 mg/hr (08/10/17 1000)    propofol 10 mcg/kg/min (08/10/17 1000)    TPN ADULT CENTRAL LINE CUSTOM 50 mL/hr at 08/10/17 0900    TPN ADULT CENTRAL LINE CUSTOM      vasopressin (PITRESSIN) infusion 0.03 Units/min (08/10/17 1000)     Scheduled Meds:   ceFEPime (MAXIPIME) IVPB  1 g Intravenous Q12H    fluconazole (DIFLUCAN) IVPB  400 mg Intravenous Q24H    sodium chloride 0.9%  10 mL Intravenous Q6H    sodium chloride 0.9%  3 mL Intravenous Q8H     PRN Meds:sodium chloride, acetaminophen, albumin human 5%, albuterol sulfate, bisacodyl, dextrose 50%, fentaNYL, glucagon (human recombinant), hydrALAZINE, insulin aspart, ondansetron, Flushing PICC Protocol **AND** sodium  chloride 0.9% **AND** sodium chloride 0.9%    Review of patient's allergies indicates:  No Known Allergies    OBJECTIVE:     Vital Signs (Most Recent)  Temp: 98.6 °F (37 °C) (08/10/17 0700)  Pulse: 79 (08/10/17 1000)  Resp: (!) 23 (08/10/17 1000)  BP: (!) 66/0 (08/10/17 0700)  SpO2: 100 % (08/10/17 1000)    Vital Signs Range (Last 24H):  Temp:  [98.2 °F (36.8 °C)-100 °F (37.8 °C)]   Pulse:  []   Resp:  [13-25]   BP: (64-78)/(0)   SpO2:  [99 %-100 %]   Arterial Line BP: (67-88)/(55-70)     I & O (Last 24H):    Intake/Output Summary (Last 24 hours) at 08/10/17 1031  Last data filed at 08/10/17 1000   Gross per 24 hour   Intake             3862 ml   Output             2275 ml   Net             1587 ml        Physical Exam   Constitutional: He appears well-developed and well-nourished. No distress. He is intubated.  HENT:   Head: Normocephalic and atraumatic.   Eyes: Right eye exhibits no discharge. Left eye exhibits no discharge. No scleral icterus.   Neck: Normal range of motion. Neck supple.   Cardiovascular: Intact distal pulses.    LVAD hum present.   Pulmonary/Chest: Effort normal. No respiratory distress.   R and L meds chest tubes in place- minimal serosanguinous drainage.  Abdominal: Soft. He exhibits no distension.   Skin: Skin is warm and dry.     Laboratory (Last 24H):  CBC:    Recent Labs  Lab 08/10/17  0501   WBC 9.94   HGB 9.1*   HCT 26.8*        CMP:    Recent Labs  Lab 08/10/17  0501   CALCIUM 7.4*   ALBUMIN 2.7*   PROT 5.2*      K 3.6   CO2 23      BUN 61*   CREATININE 2.3*   ALKPHOS 67   ALT 86*   *   BILITOT 2.6*       ASSESSMENT/PLAN:     Neuro:  - Intubated, sedated     Resp:  - Intubated: Vent Mode: A/C  Oxygen Concentration (%):  [4-100] 50  Resp Rate Total:  [16 br/min-27 br/min] 16 br/min  Vt Set:  [500 mL] 500 mL  PEEP/CPAP:  [5 cmH20] 5 cmH20  Mean Airway Pressure:  [10 sbH45-21 cmH20] 10 cmH20  - Minimize vent settings  - Possible aspiration event causing  sepsis - resp cultures sent and empiric abx started.      CV:  - HDS; LVAD numbers fairly stable throughout event  -   - Amio gtt  - Dobutamine off  - Dopamine, epi, levo, vaso drips on - goal to wean vaso and levo to 0.04 each by end of shift  - Hold  given GI bleed     Heme:  - Hgb/Hct 8.4/24.5 - transfused 1 u PRBC over ngiht.   - Continue to trend  - INR 3.3- Hold Coumadin  tonight  - Heparin off  ID:  - afebrile  - WBC 12.4  - Likely aspiration pneumonia - empiric cefepime and diflucan   - continue to monitor      Renal:  - Singer in place  - Strict I/Os   - Adequate UOP  - Lasix gtt at 10 mg/hr  - Cr 2.3, increased over night     FEN/GI:  - Strict NPO  - Stop bowel regimen and all other PO meds given GI bleed  - Protonix gtt started   - Replace lytes PRN     Endo:  - Endocrine following, appreciate assistance  - Accuchecks  - Insulin gtt     Dispo:  - Continue ICU care.     Vince Murguia PGY-1  [phone]  08/10/2017

## 2017-08-10 NOTE — PROGRESS NOTES
[facility]  Nephrology  Progress Note    Patient Name: Suman Hyaden  MRN: [id number]  Admission Date: 7/18/2017  Hospital Length of Stay: 23 days  Attending Provider: Sunny Downing MD   Primary Care Physician: Joe Ernst MD  Principal Problem:Acute on chronic combined systolic and diastolic heart failure    Subjective:     HPI: Mr. Will is a 68 yo AAM with PMHx of NICM, HTN, HLD, and CKD III who presented to the hospital on 7/18 after syncopal episode at home.  He underwent LVAD placement on 7/27.  Labs that day showed a spike in his SCr to 1.5, but he quickly returned to baseline (1.2-1.3) and stayed stable until 08/01 when he increased to 1.5 and has steadily remained above baseline since, up to 2.3 on consult.  He has remained on lasix infusion with intermittent dosages of diuril to aid in diuresis.  On 08/31, there was a reported decrease in LVAD flows which resolved with decrease in lasix infusion and administration of 1 unit of PRBCs, likely causing decrease renal perfusion leading to an ischemic event.  He continues with adequate UOP on lasix gtt. He was transferred to the floor from ICU on 08/08 and was noted to become hypotensive with a doppler pressure of 58, since been requiring pressors for BP support.  Since ICU readmission he has been having increase OG tube drainage averaging around 1.5L/24h for last 2 days.  CVP recorded at 9.  Nephrology was consulted for INDIRA and decreased UOP.    Past Medical History:   Diagnosis Date    Cardiomyopathy     Hyperlipidemia     Hypertension     Obesity     S/P implantation of automatic cardioverter/defibrillator (AICD)     Ventricular tachycardia        Past Surgical History:   Procedure Laterality Date    CARDIAC CATHETERIZATION      CARDIAC DEFIBRILLATOR PLACEMENT         Review of patient's allergies indicates:  No Known Allergies  Current Facility-Administered Medications   Medication Frequency    0.9%  NaCl infusion (for blood  administration) Q24H PRN    acetaminophen tablet 650 mg Q6H PRN    albumin human 5% bottle 500 mL PRN    albuterol nebulizer solution 2.5 mg Q4H PRN    amiodarone 360 mg/200 mL (1.8 mg/mL) infusion Continuous    bisacodyl suppository 10 mg Daily PRN    cefepime in dextrose 5 % 1 gram/50 mL IVPB 1 g Q12H    dextrose 50% injection 12.5 g PRN    DOBUtamine 1000 mg in D5W 250 mL infusion (premix non-titrating) Continuous    DOPamine 400 mg in dextrose 5 % 250 mL infusion (premix) (NON-TITRATING) Continuous    EPINEPHrine (ADRENALIN) 4 mg in sodium chloride 0.9% 250 mL infusion Continuous    fentaNYL injection 50 mcg Q4H PRN    fluconazole (DIFLUCAN) IVPB 400 mg 200 mL Q24H    furosemide (LASIX) 250 mg in sodium chloride 0.9 % 250 mL infusion (titrating) Continuous    glucagon (human recombinant) injection 1 mg PRN    hydrALAZINE injection 10 mg Q6H PRN    insulin aspart pen 0-5 Units Q4H PRN    nitric oxide gas Gas 5 ppm Continuous    norepinephrine 4 mg in dextrose 5% 250 mL infusion (premix) (titrating) Continuous    ondansetron injection 4 mg Q6H PRN    pantoprazole 40 mg in dextrose 5 % 100 mL infusion (ready to mix system) Continuous    propofol (DIPRIVAN) 10 mg/mL infusion Continuous    sodium chloride 0.9% flush 10 mL Q6H    And    sodium chloride 0.9% flush 10 mL PRN    sodium chloride 0.9% flush 3 mL Q8H    TPN ADULT CENTRAL LINE CUSTOM Continuous    TPN ADULT CENTRAL LINE CUSTOM Continuous    vasopressin (PITRESSIN) 100 Units in dextrose 5 % 100 mL infusion Continuous     Family History     None        Social History Main Topics    Smoking status: Never Smoker    Smokeless tobacco: Never Used    Alcohol use No    Drug use: Unknown    Sexual activity: Not on file     Review of Systems   Unable to perform ROS: Intubated     Objective:     Vital Signs (Most Recent):  Temp: 99.1 °F (37.3 °C) (08/10/17 1500)  Pulse: 79 (08/10/17 1445)  Resp: (!) 22 (08/10/17 1445)  BP: (!) 62/0  (08/10/17 1500)  SpO2: 100 % (08/10/17 1445)  O2 Device (Oxygen Therapy): ventilator (08/10/17 1500) Vital Signs (24h Range):  Temp:  [98.6 °F (37 °C)-100 °F (37.8 °C)] 99.1 °F (37.3 °C)  Pulse:  [79-82] 79  Resp:  [13-25] 22  SpO2:  [99 %-100 %] 100 %  BP: (62-78)/(0) 62/0  Arterial Line BP: (68-88)/(54-70) 72/56     Weight: 86.5 kg (190 lb 11.2 oz) (08/10/17 0438)  Body mass index is 29 kg/m².  Body surface area is 2.04 meters squared.    I/O last 3 completed shifts:  In: 8402 [P.O.:180; I.V.:6700; Blood:850; IV Piggyback:250]  Out: 4770 [Urine:1120; Drains:3650]    Physical Exam   Constitutional: He appears well-developed and well-nourished. No distress. He is intubated.   HENT:   Head: Normocephalic and atraumatic.   Eyes: Conjunctivae and EOM are normal. Right eye exhibits no discharge. Left eye exhibits no discharge.   Neck: Neck supple.   Cardiovascular: Normal rate and regular rhythm.    LVAD hum   Pulmonary/Chest: Effort normal and breath sounds normal. He is intubated. No respiratory distress. He has no rales.   Abdominal: Soft. He exhibits no distension.   Musculoskeletal: He exhibits no edema.   Neurological: He is alert.   Skin: Skin is warm and dry. He is not diaphoretic.       Significant Labs:  ABGs:   Recent Labs  Lab 08/10/17  1533   PH 7.530*   PCO2 36.7   HCO3 30.7*   POCSATURATED 100   BE 8     CBC:   Recent Labs  Lab 08/10/17  1140   WBC 10.23   RBC 3.23*   HGB 8.8*   HCT 25.3*      MCV 78*   MCH 27.2   MCHC 34.8     CMP:   Recent Labs  Lab 08/10/17  1140   *   CALCIUM 7.4*   ALBUMIN 2.6*   PROT 5.2*      K 3.8   CO2 25      BUN 67*   CREATININE 2.3*   ALKPHOS 67   ALT 79*   AST 91*   BILITOT 2.5*       Recent Labs  Lab 08/04/17  1655   COLORU [place]   SPECGRAV 1.010   PHUR 5.0   PROTEINUA Negative   BACTERIA Many*   NITRITE Negative   LEUKOCYTESUR 3+*   UROBILINOGEN Negative   HYALINECASTS 12*         Assessment/Plan:     Acute kidney injury superimposed on CKD    INDIRA  on CKD III  -INDIRA appears to be multifactorial, Ischemic ATN from renal hypoperfusion vs. Septic ATN.  -Baseline SCr appears to be around 1.2-1.3.  He has had multiple INDIRA's in the past, likely 2/2 to poor renal perfusion from cardiomyopathy.  -had initial insult on the day of LVAD surgery on the 27th with a spike in his SCr, but it quickly resolved and returned to baseline.  His SCr stayed stable until the 1st when it increased to 1.5 and has continued to trend up until 2.3 today.   -Signs of decreased perfusion occurred on July 31st with reported drops in LVAD Flows which resolved with decrease in lasix dose and 1 unit of PRBC infusion.   -will repeat UA, UPCR, urine lytes.    -recommend discontinuing lasix gtt.  His CVP is 9 and no signs of overload on exam, and patient is receiving albumin for volume expansion.  He has been having large volumes of OG tube suction.   -currently no acute indication for RRT.  Will continue following patient.                    Alfonzo Medina NP  Nephrology  [facility]  Pager:  [phone]

## 2017-08-10 NOTE — PLAN OF CARE
Problem: Patient Care Overview  Goal: Plan of Care Review  Outcome: Ongoing (interventions implemented as appropriate)  No acute events throughout shift  MAP > 60   q 4 hr accu checks  Epi @ 0.04, vaso @ 0.04, TPN @ 50, propofol turned off, dopamine @ 5, lasix @ 10, protonix @ 8  Vent set on spontaneous; 50% & 5 PEEP  Nitric 1 ppm  Dr. Downing changed VAD speed to 5100; low speed limit set to 4700; VAD coordinator aware  Plan is to wean to extubate as pt tolerates

## 2017-08-10 NOTE — PT/OT/SLP EVAL
Speech Language Pathology      Suman Hayden  MRN: [id number]   [id number] A     Orders received and acknowledged. Active orders discontinued due to pt remains orally intubated. Will follow up s/p extubation.  Discussed with pt's wife plan for swallow evaluation upon extubation. All questions answered.     SHERRI Hart, CCC-SLP, [facility], 8/10/2017

## 2017-08-10 NOTE — PT/OT/SLP PROGRESS
Occupational Therapy      Suman Hayden  MRN: [id number]    Patient not seen today secondary to  (RN approved bed activity only, thus appropriate for one discipline today; PT to follow).    DELMY Lucero  8/10/2017

## 2017-08-10 NOTE — SUBJECTIVE & OBJECTIVE
Interval History: patient intubated and sedated this morning, wife at bedside     Continuous Infusions:   amiodarone Stopped (08/08/17 1500)    DOBUTamine 5 mcg/kg/min (08/09/17 0126)    DOPamine 4 mcg/kg/min (08/10/17 1100)    epinephrine infusion 0.04 mcg/kg/min (08/10/17 1100)    furosemide (LASIX) 1 mg/mL infusion (titrating) 10 mg/hr (08/10/17 1100)    nitric oxide gas      norepinephrine bitartrate-D5W Stopped (08/09/17 1200)    pantoprazole 40 mg in dextrose 5 % 100 mL infusion (ready to mix system) 8 mg/hr (08/10/17 1100)    propofol 10 mcg/kg/min (08/10/17 1100)    TPN ADULT CENTRAL LINE CUSTOM 50 mL/hr at 08/10/17 0900    TPN ADULT CENTRAL LINE CUSTOM      vasopressin (PITRESSIN) infusion 0.03 Units/min (08/10/17 1100)     Scheduled Meds:   ceFEPime (MAXIPIME) IVPB  1 g Intravenous Q12H    fluconazole (DIFLUCAN) IVPB  400 mg Intravenous Q24H    sodium chloride 0.9%  10 mL Intravenous Q6H    sodium chloride 0.9%  3 mL Intravenous Q8H     PRN Meds:sodium chloride, acetaminophen, albumin human 5%, albuterol sulfate, bisacodyl, dextrose 50%, fentaNYL, glucagon (human recombinant), hydrALAZINE, insulin aspart, ondansetron, Flushing PICC Protocol **AND** sodium chloride 0.9% **AND** sodium chloride 0.9%    Review of patient's allergies indicates:  No Known Allergies  Objective:     Vital Signs (Most Recent):  Temp: 99.5 °F (37.5 °C) (08/10/17 1100)  Pulse: 80 (08/10/17 1100)  Resp: (!) 21 (08/10/17 1100)  BP: (!) 68/0 (08/10/17 1100)  SpO2: 100 % (08/10/17 1100) Vital Signs (24h Range):  Temp:  [98.6 °F (37 °C)-100 °F (37.8 °C)] 99.5 °F (37.5 °C)  Pulse:  [] 80  Resp:  [13-25] 21  SpO2:  [99 %-100 %] 100 %  BP: (64-78)/(0) 68/0  Arterial Line BP: (67-88)/(55-70) 71/55     Weight: 86.5 kg (190 lb 11.2 oz)  Body mass index is 29 kg/m².      Intake/Output Summary (Last 24 hours) at 08/10/17 1142  Last data filed at 08/10/17 1100   Gross per 24 hour   Intake             3362 ml   Output              2280 ml   Net             1082 ml       Hemodynamic Parameters:           Physical Exam   Constitutional: He is oriented to person, place, and time. He appears well-developed and well-nourished. No distress. He is sedated and intubated.   HENT:   Head: Normocephalic and atraumatic.   Eyes: EOM are normal. Pupils are equal, round, and reactive to light.   Neck: Normal range of motion. Neck supple. No JVD present.   Cardiovascular: Normal rate and regular rhythm.  Exam reveals no gallop and no friction rub.    No murmur heard.  + LVAD hum    Pulmonary/Chest: Effort normal and breath sounds normal. He is intubated. No respiratory distress. He has no wheezes. He has no rales.   Abdominal: Soft. He exhibits no distension. There is no tenderness. There is no guarding.   Musculoskeletal: Normal range of motion. He exhibits no edema.   Neurological: He is oriented to person, place, and time. No cranial nerve deficit. Coordination normal.   Skin: Skin is warm and dry. He is not diaphoretic. No erythema.   Nursing note and vitals reviewed.      Significant Labs:  CBC:    Recent Labs  Lab 08/10/17  0501   WBC 9.94   RBC 3.34*   HGB 9.1*   HCT 26.8*      MCV 80*   MCH 27.2   MCHC 34.0     BNP:    Recent Labs  Lab 08/09/17  0349   BNP 1,232*  1,232*     CMP:    Recent Labs  Lab 08/10/17  0501   *   CALCIUM 7.4*   ALBUMIN 2.7*   PROT 5.2*      K 3.6   CO2 23      BUN 61*   CREATININE 2.3*   ALKPHOS 67   ALT 86*   *   BILITOT 2.6*      Coagulation:     Recent Labs  Lab 08/10/17  0501 08/10/17  0638   INR 6.4* 7.3*   APTT 58.8*  --      LDH:    Recent Labs  Lab 08/08/17  0512 08/09/17  0349 08/10/17  0501    335* 319*     Microbiology:  Microbiology Results (last 7 days)     Procedure Component Value Units Date/Time    Urine culture [[id number]] Collected:  08/09/17 0704    Order Status:  Completed Specimen:  Urine from Urine, Catheterized Updated:  08/10/17 1128     Urine Culture,  Routine No growth    Blood culture [[id number]] Collected:  08/09/17 0813    Order Status:  Completed Specimen:  Blood from Line, Arterial, Right Updated:  08/10/17 1113     Blood Culture, Routine Gram stain clement bottle: Gram negative rods      Blood Culture, Routine Results called to and read back by: Emma York RN 08/10/2017  11:13    Blood culture [[id number]] Collected:  08/09/17 0733    Order Status:  Completed Specimen:  Blood from Peripheral, Antecubital, Left Updated:  08/10/17 1022     Blood Culture, Routine No Growth to date     Blood Culture, Routine No Growth to date    Blood culture [[id number]] Collected:  08/04/17 1653    Order Status:  Completed Specimen:  Blood from Peripheral, Wrist, Right Updated:  08/09/17 2012     Blood Culture, Routine No growth after 5 days.    Blood culture [[id number]] Collected:  08/04/17 1654    Order Status:  Completed Specimen:  Blood from Peripheral, Wrist, Left Updated:  08/09/17 2012     Blood Culture, Routine No growth after 5 days.    Culture, Respiratory with Gram Stain [[id number]] Collected:  08/09/17 0723    Order Status:  Completed Specimen:  Respiratory from Endotracheal Aspirate Updated:  08/09/17 1252     Gram Stain (Respiratory) <10 epithelial cells per low power field.     Gram Stain (Respiratory) Few WBC's     Gram Stain (Respiratory) Many Gram negative rods     Gram Stain (Respiratory) Few Gram positive rods     Gram Stain (Respiratory) Few Gram positive cocci    Urine culture [[id number]] Collected:  08/04/17 1655    Order Status:  Completed Specimen:  Urine from Urine, Catheterized Updated:  08/05/17 2116     Urine Culture, Routine No growth          I have reviewed all pertinent labs within the past 24 hours.    Estimated Creatinine Clearance: 33.3 mL/min (based on Cr of 2.3).    Diagnostic Results:  I have reviewed and interpreted all pertinent imaging results/findings within the past 24 hours.

## 2017-08-10 NOTE — ASSESSMENT & PLAN NOTE
INDIRA on CKD III  -INDIRA appears to be multifactorial, Ischemic ATN from renal hypoperfusion vs. Septic ATN.  -Baseline SCr appears to be around 1.2-1.3.  He has had multiple INDIRA's in the past, likely 2/2 to poor renal perfusion from cardiomyopathy.  -had initial insult on the day of LVAD surgery on the 27th with a spike in his SCr, but it quickly resolved and returned to baseline.  His SCr stayed stable until the 1st when it increased to 1.5 and has continued to trend up until 2.3 today.   -Signs of decreased perfusion occurred on July 31st with reported drops in LVAD Flows which resolved with decrease in lasix dose and 1 unit of PRBC infusion.   -will repeat UA, UPCR, urine lytes.    -recommend discontinuing lasix gtt.  His CVP is 9 and no signs of overload on exam, and patient is receiving albumin for volume expansion.  He has been having large volumes of OG tube suction.   -currently no acute indication for RRT.  Will continue following patient.

## 2017-08-10 NOTE — PROGRESS NOTES
Dr. Downing on phone, update given. Orders received to wean sedation and attempt CPAP in AM as tolerated. Must replace OGT with an NGT if extubated and INR is stable. Patient is to remain on strict NPO, no PO meds to be given. Orders reviewed and noted.

## 2017-08-10 NOTE — PROGRESS NOTES
Family requested to do VAD dressing change. RN assisted and directed family member on proper sterile technique throughout procedure. Wife setup sterile. Removed drsg with clean gloves, site pink, edges approximated, no redness, drainage, sutures remain intact. Minimal serous drainage. Sterile gloves placed using sterile technique. Site cleanesed with sterile normal saline, clorhexadene, and dried with sterile gauze.

## 2017-08-10 NOTE — PT/OT/SLP RE-EVAL
Physical Therapy  Re-evaluation    Suman Hayden   MRN: [id number]   Admitting Diagnosis: Acute on chronic combined systolic and diastolic heart failure    PT Received On: 08/10/17  PT Start Time: 1051     PT Stop Time: 1111    PT Total Time (min): 20 min       Billable Minutes:  Re-eval 10 min and Therapeutic Exercise 10 min    Diagnosis: Acute on chronic combined systolic and diastolic heart failure  Pt was evaled 7/29 after LVAD HM3 placement 7/27 and chest closure 7/28. Pt was transferred to ICU and re-intubated 8/9/17.    Past Medical History:   Diagnosis Date    Cardiomyopathy     Hyperlipidemia     Hypertension     Obesity     S/P implantation of automatic cardioverter/defibrillator (AICD)     Ventricular tachycardia       Past Surgical History:   Procedure Laterality Date    CARDIAC CATHETERIZATION      CARDIAC DEFIBRILLATOR PLACEMENT         Referring physician: Sunny Downing  Date referred to PT: 8/10/17    General Precautions: Standard, LVAD, fall, sternal  Orthopedic Precautions: N/A   Braces:         Do you have any cultural, spiritual, Judaism conflicts, given your current situation?: none noted     Patient History:  Lives With: spouse  Living Arrangements: house  Transportation Available: family or friend will provide  Living Environment Comment: Pt lives with his wife in a 1SH with 0 BYRON. PTA pt was (I) with ambulation and ADLs without use of DME.   Equipment Currently Used at Home: none  DME owned (not currently used): none    Previous Level of Function:  Ambulation Skills: independent  Transfer Skills: independent  ADL Skills: independent    Subjective:  Communicated with nurse prior to session.    Chief Complaint: pt had no complaints during treatment.   Patient goals: family goals: pt to recover fully     Pain/Comfort  Pain Rating 1: 0/10  Pain Rating Post-Intervention 1: 0/10    Objective:   Patient found with: telemetry, arterial line, pulse ox (continuous), ventilator, restraints, delgado  catheter, SCD (LVAD, vent to ET tube, OG tube, BUE restraints, B Rich Renita boots)     Cognitive Exam:  Oriented to: pt intubated.    Follows Commands/attention: Follows two-step commands  Communication: pt intubated.  Safety awareness/insight to disability: impaired    Physical Exam:  Upper Extremity Range of Motion:  Right Upper Extremity: WFL  Left Upper Extremity: WFL    Upper Extremity Strength:  Right Upper Extremity: 3-/5 for major muscle groups. tested in supine  Left Upper Extremity: see above    Lower Extremity Range of Motion:  Right Lower Extremity: WFL  Left Lower Extremity: WFL    Lower Extremity Strength:  Right Lower Extremity: 3-/5 for major muscle groups tested in supine.   Left Lower Extremity: see above       Functional Mobility:  Bed Mobility:  Rolling/Turning to Left:  (not tested. pt was intubated with light sedation. RN approved ther exer in bed.)    Transfers:  Sit <> Stand Assistance:  (not tested. see above.)    Gait:   Gait Distance: not tested, see above      Therapeutic Activities and Exercises:  Pt performed AAROM there exer x 4 extremities x 15 reps in supine. Pt was returned to BUE restraints after treatment.     AM-PAC 6 CLICK MOBILITY  How much help from another person does this patient currently need?   1 = Unable, Total/Dependent Assistance  2 = A lot, Maximum/Moderate Assistance  3 = A little, Minimum/Contact Guard/Supervision  4 = None, Modified [place]/Independent    Turning over in bed (including adjusting bedclothes, sheets and blankets)?: 2  Sitting down on and standing up from a chair with arms (e.g., wheelchair, bedside commode, etc.): 2  Moving from lying on back to sitting on the side of the bed?: 2  Moving to and from a bed to a chair (including a wheelchair)?: 2  Need to walk in hospital room?: 1  Climbing 3-5 steps with a railing?: 1  Total Score: 10     AM-PAC Raw Score CMS G-Code Modifier Level of Impairment Assistance   6 % Total / Unable   7 - 9 CM 80 -  100% Maximal Assist   10 - 14 CL 60 - 80% Moderate Assist   15 - 19 CK 40 - 60% Moderate Assist   20 - 22 CJ 20 - 40% Minimal Assist   23 CI 1-20% SBA / CGA   24 CH 0% Independent/ Mod I     Patient left supine with all lines intact, call button in reach, restraints reapplied at end of session and wife present.    Assessment:   Suman Hayden is a 67 y.o. male with a medical diagnosis of Acute on chronic combined systolic and diastolic heart failure and presents with decreased strength, mobility, transfers, balance and decreased distance ambulated. Pt would benefit from cont PT to address deficits and will need HHPT when medically stable. Pt will benefit from skilled PT 6x/wk to progress physically and return pt to Independent status. Pt is s/p LVAD HM3 placement  and chest closure 17.    Rehab identified problem list/impairments: Rehab identified problem list/impairments: weakness, impaired endurance, gait instability, impaired balance, impaired functional mobilty, decreased lower extremity function    Rehab potential is good.    Activity tolerance: Good    Discharge recommendations: Discharge Facility/Level Of Care Needs: home health PT     Barriers to discharge: Barriers to Discharge: None    Equipment recommendations: Equipment Needed After Discharge: none     GOALS:    Physical Therapy Goals        Problem: Physical Therapy Goal    Goal Priority Disciplines Outcome Goal Variances Interventions   Physical Therapy Goal     PT/OT, PT Ongoing (interventions implemented as appropriate)     Description:  Goals to be met by: 17     Patient will increase functional independence with mobility by performin. Supine to sit with MInimal Assistance- not met  2. Sit to supine with MInimal Assistance - not met  3. Sit to stand transfer with Minimal Assistance- not met  4. Bed to chair transfer with Minimal Assistance- not met  5. Gait  x 50 feet with Minimal Assistance. - not met  6. Lower extremity exercise  program x15 reps, with supervision, in order to increase LE strength and (I) with functional mobility. - not met                        PLAN:    Patient to be seen 6 x/week to address the above listed problems via gait training, therapeutic activities, therapeutic exercises  Plan of Care expires: 09/09/17  Plan of Care reviewed with: patient, spouse          Astrid MONTANO Jovana, PT  08/10/2017

## 2017-08-11 PROBLEM — R78.81 BACTEREMIA: Status: ACTIVE | Noted: 2017-08-11

## 2017-08-11 PROBLEM — R82.79 URINE CULTURE POSITIVE: Status: RESOLVED | Noted: 2017-07-26 | Resolved: 2017-08-11

## 2017-08-11 PROBLEM — N18.9 ACUTE KIDNEY INJURY SUPERIMPOSED ON CKD: Status: ACTIVE | Noted: 2017-08-11

## 2017-08-11 PROBLEM — N17.9 ACUTE KIDNEY INJURY SUPERIMPOSED ON CKD: Status: ACTIVE | Noted: 2017-08-11

## 2017-08-11 LAB
ALBUMIN SERPL BCP-MCNC: 2.4 G/DL
ALBUMIN SERPL BCP-MCNC: 2.5 G/DL
ALBUMIN SERPL BCP-MCNC: 2.7 G/DL
ALBUMIN SERPL BCP-MCNC: 2.9 G/DL
ALLENS TEST: ABNORMAL
ALP SERPL-CCNC: 104 U/L
ALP SERPL-CCNC: 73 U/L
ALP SERPL-CCNC: 90 U/L
ALP SERPL-CCNC: 95 U/L
ALT SERPL W/O P-5'-P-CCNC: 43 U/L
ALT SERPL W/O P-5'-P-CCNC: 46 U/L
ALT SERPL W/O P-5'-P-CCNC: 53 U/L
ALT SERPL W/O P-5'-P-CCNC: 59 U/L
ANION GAP SERPL CALC-SCNC: 11 MMOL/L
ANION GAP SERPL CALC-SCNC: 12 MMOL/L
ANION GAP SERPL CALC-SCNC: 13 MMOL/L
ANION GAP SERPL CALC-SCNC: 14 MMOL/L
ANISOCYTOSIS BLD QL SMEAR: SLIGHT
APTT BLDCRRT: 61.9 SEC
AST SERPL-CCNC: 34 U/L
AST SERPL-CCNC: 48 U/L
AST SERPL-CCNC: 50 U/L
AST SERPL-CCNC: 58 U/L
BACTERIA SPEC AEROBE CULT: NORMAL
BASO STIPL BLD QL SMEAR: ABNORMAL
BASOPHILS # BLD AUTO: 0.01 K/UL
BASOPHILS # BLD AUTO: 0.02 K/UL
BASOPHILS NFR BLD: 0.1 %
BASOPHILS NFR BLD: 0.2 %
BILIRUB SERPL-MCNC: 2.1 MG/DL
BILIRUB SERPL-MCNC: 2.2 MG/DL
BILIRUB SERPL-MCNC: 2.4 MG/DL
BILIRUB SERPL-MCNC: 2.5 MG/DL
BNP SERPL-MCNC: 2609 PG/ML
BUN SERPL-MCNC: 74 MG/DL
BUN SERPL-MCNC: 79 MG/DL
BUN SERPL-MCNC: 81 MG/DL
BUN SERPL-MCNC: 83 MG/DL
BURR CELLS BLD QL SMEAR: ABNORMAL
BURR CELLS BLD QL SMEAR: ABNORMAL
CALCIUM SERPL-MCNC: 7.6 MG/DL
CALCIUM SERPL-MCNC: 7.8 MG/DL
CALCIUM SERPL-MCNC: 7.9 MG/DL
CALCIUM SERPL-MCNC: 7.9 MG/DL
CHLORIDE SERPL-SCNC: 103 MMOL/L
CHLORIDE SERPL-SCNC: 104 MMOL/L
CHLORIDE SERPL-SCNC: 104 MMOL/L
CHLORIDE SERPL-SCNC: 106 MMOL/L
CO2 SERPL-SCNC: 23 MMOL/L
CO2 SERPL-SCNC: 24 MMOL/L
CO2 SERPL-SCNC: 25 MMOL/L
CO2 SERPL-SCNC: 26 MMOL/L
CREAT SERPL-MCNC: 2 MG/DL
CREAT SERPL-MCNC: 2.1 MG/DL
CREAT SERPL-MCNC: 2.2 MG/DL
CREAT SERPL-MCNC: 2.3 MG/DL
CRP SERPL-MCNC: 204 MG/L
DELSYS: ABNORMAL
DIFFERENTIAL METHOD: ABNORMAL
EOSINOPHIL # BLD AUTO: 0 K/UL
EOSINOPHIL # BLD AUTO: 0.1 K/UL
EOSINOPHIL NFR BLD: 0.2 %
EOSINOPHIL NFR BLD: 0.4 %
EOSINOPHIL NFR BLD: 0.6 %
EOSINOPHIL NFR BLD: 0.9 %
ERYTHROCYTE [DISTWIDTH] IN BLOOD BY AUTOMATED COUNT: 15.7 %
ERYTHROCYTE [DISTWIDTH] IN BLOOD BY AUTOMATED COUNT: 15.8 %
ERYTHROCYTE [DISTWIDTH] IN BLOOD BY AUTOMATED COUNT: 15.9 %
ERYTHROCYTE [DISTWIDTH] IN BLOOD BY AUTOMATED COUNT: 15.9 %
ERYTHROCYTE [SEDIMENTATION RATE] IN BLOOD BY WESTERGREN METHOD: 14 MM/H
ERYTHROCYTE [SEDIMENTATION RATE] IN BLOOD BY WESTERGREN METHOD: 14 MM/H
EST. GFR  (AFRICAN AMERICAN): 32.7 ML/MIN/1.73 M^2
EST. GFR  (AFRICAN AMERICAN): 34.6 ML/MIN/1.73 M^2
EST. GFR  (AFRICAN AMERICAN): 36.6 ML/MIN/1.73 M^2
EST. GFR  (AFRICAN AMERICAN): 38.8 ML/MIN/1.73 M^2
EST. GFR  (NON AFRICAN AMERICAN): 28.3 ML/MIN/1.73 M^2
EST. GFR  (NON AFRICAN AMERICAN): 29.9 ML/MIN/1.73 M^2
EST. GFR  (NON AFRICAN AMERICAN): 31.6 ML/MIN/1.73 M^2
EST. GFR  (NON AFRICAN AMERICAN): 33.5 ML/MIN/1.73 M^2
FIO2: 50
FLOW: 60
GIANT PLATELETS BLD QL SMEAR: PRESENT
GLUCOSE SERPL-MCNC: 130 MG/DL
GLUCOSE SERPL-MCNC: 147 MG/DL
GLUCOSE SERPL-MCNC: 152 MG/DL
GLUCOSE SERPL-MCNC: 154 MG/DL
GRAM STN SPEC: NORMAL
HCO3 UR-SCNC: 22.6 MMOL/L (ref 24–28)
HCO3 UR-SCNC: 29.6 MMOL/L (ref 24–28)
HCO3 UR-SCNC: 29.8 MMOL/L (ref 24–28)
HCO3 UR-SCNC: 30.8 MMOL/L (ref 24–28)
HCT VFR BLD AUTO: 23 %
HCT VFR BLD AUTO: 23.5 %
HCT VFR BLD AUTO: 23.5 %
HCT VFR BLD AUTO: 23.6 %
HGB BLD-MCNC: 8 G/DL
HGB BLD-MCNC: 8 G/DL
HGB BLD-MCNC: 8.2 G/DL
HGB BLD-MCNC: 8.3 G/DL
HYPOCHROMIA BLD QL SMEAR: ABNORMAL
HYPOCHROMIA BLD QL SMEAR: ABNORMAL
INR PPP: 5.8
LDH SERPL L TO P-CCNC: 1.82 MMOL/L (ref 0.36–1.25)
LDH SERPL L TO P-CCNC: 1.86 MMOL/L (ref 0.36–1.25)
LDH SERPL L TO P-CCNC: 207 U/L
LYMPHOCYTES # BLD AUTO: 0.7 K/UL
LYMPHOCYTES # BLD AUTO: 0.8 K/UL
LYMPHOCYTES # BLD AUTO: 0.9 K/UL
LYMPHOCYTES # BLD AUTO: 0.9 K/UL
LYMPHOCYTES NFR BLD: 6.7 %
LYMPHOCYTES NFR BLD: 7.2 %
LYMPHOCYTES NFR BLD: 7.2 %
LYMPHOCYTES NFR BLD: 8.9 %
MAGNESIUM SERPL-MCNC: 2.1 MG/DL
MCH RBC QN AUTO: 26.8 PG
MCH RBC QN AUTO: 26.9 PG
MCH RBC QN AUTO: 27.1 PG
MCH RBC QN AUTO: 27.3 PG
MCHC RBC AUTO-ENTMCNC: 34 G/DL
MCHC RBC AUTO-ENTMCNC: 34.8 G/DL
MCHC RBC AUTO-ENTMCNC: 34.9 G/DL
MCHC RBC AUTO-ENTMCNC: 35.2 G/DL
MCV RBC AUTO: 77 FL
MCV RBC AUTO: 78 FL
MCV RBC AUTO: 78 FL
MCV RBC AUTO: 79 FL
MIN VOL: 9.37
MODE: ABNORMAL
MONOCYTES # BLD AUTO: 0.1 K/UL
MONOCYTES # BLD AUTO: 0.3 K/UL
MONOCYTES # BLD AUTO: 0.4 K/UL
MONOCYTES # BLD AUTO: 0.9 K/UL
MONOCYTES NFR BLD: 1.1 %
MONOCYTES NFR BLD: 2.6 %
MONOCYTES NFR BLD: 3.3 %
MONOCYTES NFR BLD: 8.3 %
NEUTROPHILS # BLD AUTO: 10.4 K/UL
NEUTROPHILS # BLD AUTO: 10.7 K/UL
NEUTROPHILS # BLD AUTO: 8.8 K/UL
NEUTROPHILS # BLD AUTO: 9 K/UL
NEUTROPHILS NFR BLD: 84 %
NEUTROPHILS NFR BLD: 88.8 %
NEUTROPHILS NFR BLD: 89.6 %
NEUTROPHILS NFR BLD: 89.7 %
OVALOCYTES BLD QL SMEAR: ABNORMAL
OVALOCYTES BLD QL SMEAR: ABNORMAL
PCO2 BLDA: 34.2 MMHG (ref 35–45)
PCO2 BLDA: 37.3 MMHG (ref 35–45)
PCO2 BLDA: 38.9 MMHG (ref 35–45)
PCO2 BLDA: 41.8 MMHG (ref 35–45)
PEEP: 5
PH SMN: 7.43 [PH] (ref 7.35–7.45)
PH SMN: 7.47 [PH] (ref 7.35–7.45)
PH SMN: 7.49 [PH] (ref 7.35–7.45)
PH SMN: 7.51 [PH] (ref 7.35–7.45)
PHOSPHATE SERPL-MCNC: 2.6 MG/DL
PIP: 19
PLATELET # BLD AUTO: 153 K/UL
PLATELET # BLD AUTO: 156 K/UL
PLATELET # BLD AUTO: 167 K/UL
PLATELET # BLD AUTO: 184 K/UL
PLATELET BLD QL SMEAR: ABNORMAL
PMV BLD AUTO: 11.4 FL
PMV BLD AUTO: 11.4 FL
PMV BLD AUTO: 11.6 FL
PMV BLD AUTO: 12.1 FL
PO2 BLDA: 145 MMHG (ref 80–100)
PO2 BLDA: 148 MMHG (ref 80–100)
PO2 BLDA: 179 MMHG (ref 80–100)
PO2 BLDA: 184 MMHG (ref 80–100)
POC BE: -2 MMOL/L
POC BE: 6 MMOL/L
POC BE: 7 MMOL/L
POC BE: 7 MMOL/L
POC SATURATED O2: 100 % (ref 95–100)
POC SATURATED O2: 100 % (ref 95–100)
POC SATURATED O2: 99 % (ref 95–100)
POC SATURATED O2: 99 % (ref 95–100)
POC TCO2: 24 MMOL/L (ref 23–27)
POC TCO2: 31 MMOL/L (ref 23–27)
POC TCO2: 31 MMOL/L (ref 23–27)
POC TCO2: 32 MMOL/L (ref 23–27)
POCT GLUCOSE: 129 MG/DL (ref 70–110)
POCT GLUCOSE: 147 MG/DL (ref 70–110)
POCT GLUCOSE: 150 MG/DL (ref 70–110)
POIKILOCYTOSIS BLD QL SMEAR: SLIGHT
POIKILOCYTOSIS BLD QL SMEAR: SLIGHT
POLYCHROMASIA BLD QL SMEAR: ABNORMAL
POLYCHROMASIA BLD QL SMEAR: ABNORMAL
POTASSIUM SERPL-SCNC: 3.2 MMOL/L
POTASSIUM SERPL-SCNC: 3.5 MMOL/L
POTASSIUM SERPL-SCNC: 3.6 MMOL/L
POTASSIUM SERPL-SCNC: 3.7 MMOL/L
PREALB SERPL-MCNC: 5 MG/DL
PROT SERPL-MCNC: 5.2 G/DL
PROT SERPL-MCNC: 5.2 G/DL
PROT SERPL-MCNC: 5.3 G/DL
PROT SERPL-MCNC: 5.4 G/DL
PROTHROMBIN TIME: 60.2 SEC
PS: 10
RBC # BLD AUTO: 2.97 M/UL
RBC # BLD AUTO: 2.98 M/UL
RBC # BLD AUTO: 3.03 M/UL
RBC # BLD AUTO: 3.04 M/UL
SAMPLE: ABNORMAL
SITE: ABNORMAL
SODIUM SERPL-SCNC: 140 MMOL/L
SODIUM SERPL-SCNC: 140 MMOL/L
SODIUM SERPL-SCNC: 142 MMOL/L
SODIUM SERPL-SCNC: 143 MMOL/L
SP02: 100
SPONT RATE: 1
SPONT RATE: 16
TARGETS BLD QL SMEAR: ABNORMAL
TARGETS BLD QL SMEAR: ABNORMAL
VT: 500
VT: 500
WBC # BLD AUTO: 10.1 K/UL
WBC # BLD AUTO: 10.56 K/UL
WBC # BLD AUTO: 11.74 K/UL
WBC # BLD AUTO: 12.14 K/UL

## 2017-08-11 PROCEDURE — 27200966 HC CLOSED SUCTION SYSTEM

## 2017-08-11 PROCEDURE — 87040 BLOOD CULTURE FOR BACTERIA: CPT

## 2017-08-11 PROCEDURE — A4216 STERILE WATER/SALINE, 10 ML: HCPCS | Performed by: THORACIC SURGERY (CARDIOTHORACIC VASCULAR SURGERY)

## 2017-08-11 PROCEDURE — 85730 THROMBOPLASTIN TIME PARTIAL: CPT

## 2017-08-11 PROCEDURE — 80053 COMPREHEN METABOLIC PANEL: CPT | Mod: 91

## 2017-08-11 PROCEDURE — 84100 ASSAY OF PHOSPHORUS: CPT

## 2017-08-11 PROCEDURE — 63600175 PHARM REV CODE 636 W HCPCS: Performed by: STUDENT IN AN ORGANIZED HEALTH CARE EDUCATION/TRAINING PROGRAM

## 2017-08-11 PROCEDURE — 85025 COMPLETE CBC W/AUTO DIFF WBC: CPT | Mod: 91

## 2017-08-11 PROCEDURE — 99233 SBSQ HOSP IP/OBS HIGH 50: CPT | Mod: ,,, | Performed by: INTERNAL MEDICINE

## 2017-08-11 PROCEDURE — 97803 MED NUTRITION INDIV SUBSEQ: CPT

## 2017-08-11 PROCEDURE — 99233 SBSQ HOSP IP/OBS HIGH 50: CPT | Mod: GC,,, | Performed by: SURGERY

## 2017-08-11 PROCEDURE — 37799 UNLISTED PX VASCULAR SURGERY: CPT

## 2017-08-11 PROCEDURE — 99233 SBSQ HOSP IP/OBS HIGH 50: CPT | Mod: 24,,, | Performed by: THORACIC SURGERY (CARDIOTHORACIC VASCULAR SURGERY)

## 2017-08-11 PROCEDURE — 99232 SBSQ HOSP IP/OBS MODERATE 35: CPT | Mod: ,,, | Performed by: INTERNAL MEDICINE

## 2017-08-11 PROCEDURE — 94761 N-INVAS EAR/PLS OXIMETRY MLT: CPT

## 2017-08-11 PROCEDURE — 93750 INTERROGATION VAD IN PERSON: CPT | Performed by: THORACIC SURGERY (CARDIOTHORACIC VASCULAR SURGERY)

## 2017-08-11 PROCEDURE — 85007 BL SMEAR W/DIFF WBC COUNT: CPT

## 2017-08-11 PROCEDURE — 99900026 HC AIRWAY MAINTENANCE (STAT)

## 2017-08-11 PROCEDURE — 85027 COMPLETE CBC AUTOMATED: CPT

## 2017-08-11 PROCEDURE — C9113 INJ PANTOPRAZOLE SODIUM, VIA: HCPCS | Performed by: STUDENT IN AN ORGANIZED HEALTH CARE EDUCATION/TRAINING PROGRAM

## 2017-08-11 PROCEDURE — 84134 ASSAY OF PREALBUMIN: CPT

## 2017-08-11 PROCEDURE — 93750 INTERROGATION VAD IN PERSON: CPT | Mod: ,,, | Performed by: INTERNAL MEDICINE

## 2017-08-11 PROCEDURE — 85610 PROTHROMBIN TIME: CPT

## 2017-08-11 PROCEDURE — 86140 C-REACTIVE PROTEIN: CPT

## 2017-08-11 PROCEDURE — 25000003 PHARM REV CODE 250: Performed by: STUDENT IN AN ORGANIZED HEALTH CARE EDUCATION/TRAINING PROGRAM

## 2017-08-11 PROCEDURE — 25000003 PHARM REV CODE 250: Performed by: THORACIC SURGERY (CARDIOTHORACIC VASCULAR SURGERY)

## 2017-08-11 PROCEDURE — 20000000 HC ICU ROOM

## 2017-08-11 PROCEDURE — 93750 INTERROGATION VAD IN PERSON: CPT | Mod: 77,,, | Performed by: THORACIC SURGERY (CARDIOTHORACIC VASCULAR SURGERY)

## 2017-08-11 PROCEDURE — 82803 BLOOD GASES ANY COMBINATION: CPT

## 2017-08-11 PROCEDURE — 97110 THERAPEUTIC EXERCISES: CPT

## 2017-08-11 PROCEDURE — 94003 VENT MGMT INPAT SUBQ DAY: CPT

## 2017-08-11 PROCEDURE — 99900035 HC TECH TIME PER 15 MIN (STAT)

## 2017-08-11 PROCEDURE — 27000221 HC OXYGEN, UP TO 24 HOURS

## 2017-08-11 PROCEDURE — 83880 ASSAY OF NATRIURETIC PEPTIDE: CPT

## 2017-08-11 PROCEDURE — 83615 LACTATE (LD) (LDH) ENZYME: CPT

## 2017-08-11 PROCEDURE — 63600367 HC NITRIC OXIDE PER HOUR

## 2017-08-11 PROCEDURE — 27000248 HC VAD-ADDITIONAL DAY

## 2017-08-11 PROCEDURE — 83735 ASSAY OF MAGNESIUM: CPT

## 2017-08-11 PROCEDURE — 83605 ASSAY OF LACTIC ACID: CPT

## 2017-08-11 RX ORDER — POTASSIUM CHLORIDE 29.8 MG/ML
40 INJECTION INTRAVENOUS ONCE
Status: COMPLETED | OUTPATIENT
Start: 2017-08-11 | End: 2017-08-11

## 2017-08-11 RX ORDER — PANTOPRAZOLE SODIUM 40 MG/10ML
40 INJECTION, POWDER, LYOPHILIZED, FOR SOLUTION INTRAVENOUS 2 TIMES DAILY
Status: DISCONTINUED | OUTPATIENT
Start: 2017-08-11 | End: 2017-08-17

## 2017-08-11 RX ADMIN — PANTOPRAZOLE SODIUM 40 MG: 40 INJECTION, POWDER, FOR SOLUTION INTRAVENOUS at 09:08

## 2017-08-11 RX ADMIN — Medication 10 ML: at 06:08

## 2017-08-11 RX ADMIN — VASOPRESSIN 0.03 UNITS/MIN: 20 INJECTION INTRAVENOUS at 05:08

## 2017-08-11 RX ADMIN — PANTOPRAZOLE SODIUM 40 MG: 40 INJECTION, POWDER, FOR SOLUTION INTRAVENOUS at 11:08

## 2017-08-11 RX ADMIN — CALCIUM GLUCONATE: 94 INJECTION, SOLUTION INTRAVENOUS at 10:08

## 2017-08-11 RX ADMIN — CEFEPIME HYDROCHLORIDE 1 G: 1 INJECTION, SOLUTION INTRAVENOUS at 07:08

## 2017-08-11 RX ADMIN — DEXTROSE 8 MG/HR: 50 INJECTION, SOLUTION INTRAVENOUS at 07:08

## 2017-08-11 RX ADMIN — FUROSEMIDE 10 MG/HR: 10 INJECTION, SOLUTION INTRAMUSCULAR; INTRAVENOUS at 09:08

## 2017-08-11 RX ADMIN — FLUCONAZOLE 400 MG: 2 INJECTION INTRAVENOUS at 09:08

## 2017-08-11 RX ADMIN — CEFEPIME HYDROCHLORIDE 1 G: 1 INJECTION, SOLUTION INTRAVENOUS at 08:08

## 2017-08-11 RX ADMIN — Medication 10 ML: at 11:08

## 2017-08-11 RX ADMIN — POTASSIUM CHLORIDE 40 MEQ: 400 INJECTION, SOLUTION INTRAVENOUS at 06:08

## 2017-08-11 RX ADMIN — EPINEPHRINE 0.04 MCG/KG/MIN: 1 INJECTION PARENTERAL at 08:08

## 2017-08-11 RX ADMIN — FENTANYL CITRATE 50 MCG: 50 INJECTION INTRAMUSCULAR; INTRAVENOUS at 11:08

## 2017-08-11 RX ADMIN — EPINEPHRINE 0.04 MCG/KG/MIN: 1 INJECTION PARENTERAL at 02:08

## 2017-08-11 RX ADMIN — DOPAMINE HYDROCHLORIDE IN DEXTROSE 5 MCG/KG/MIN: 1.6 INJECTION, SOLUTION INTRAVENOUS at 10:08

## 2017-08-11 RX ADMIN — Medication 10 ML: at 05:08

## 2017-08-11 RX ADMIN — FENTANYL CITRATE 50 MCG: 50 INJECTION INTRAMUSCULAR; INTRAVENOUS at 07:08

## 2017-08-11 RX ADMIN — DEXTROSE 8 MG/HR: 50 INJECTION, SOLUTION INTRAVENOUS at 01:08

## 2017-08-11 RX ADMIN — FENTANYL CITRATE 50 MCG: 50 INJECTION INTRAMUSCULAR; INTRAVENOUS at 01:08

## 2017-08-11 RX ADMIN — Medication 10 ML: at 12:08

## 2017-08-11 RX ADMIN — DOPAMINE HYDROCHLORIDE IN DEXTROSE 5 MCG/KG/MIN: 1.6 INJECTION, SOLUTION INTRAVENOUS at 02:08

## 2017-08-11 NOTE — PROGRESS NOTES
[facility]  Nephrology  Progress Note    Patient Name: Suman Hayden  MRN: [id number]  Admission Date: 7/18/2017  Hospital Length of Stay: 24 days  Attending Provider: Sunny Downing MD   Primary Care Physician: Joe Ernst MD  Principal Problem:Acute on chronic combined systolic and diastolic heart failure    Subjective:     HPI: Mr. Will is a 66 yo AAM with PMHx of NICM, HTN, HLD, and CKD III who presented to the hospital on 7/18 after syncopal episode at home.  He underwent LVAD placement on 7/27.  Labs that day showed a spike in his SCr to 1.5, but he quickly returned to baseline (1.2-1.3) and stayed stable until 08/01 when he increased to 1.5 and has steadily remained above baseline since, up to 2.3 on consult.  He has remained on lasix infusion with intermittent dosages of diuril to aid in diuresis.  On 08/31, there was a reported decrease in LVAD flows which resolved with decrease in lasix infusion and administration of 1 unit of PRBCs, likely causing decrease renal perfusion leading to an ischemic event.  He continues with adequate UOP on lasix gtt. He was transferred to the floor from ICU on 08/08 and was noted to become hypotensive with a doppler pressure of 58, since been requiring pressors for BP support.  Since ICU readmission he has been having increase OG tube drainage averaging around 1.5L/24h for last 2 days.  CVP recorded at 9.  Nephrology was consulted for INDIRA and decreased UOP.    Interval History:   CVP of 9 this morning, vent settings stable.  Administered albumin yesterday afternoon with improvement in his UOP.  He remains on lasix gtt this morning.  sCR improved, down to 2.2 from 2.3.      Review of patient's allergies indicates:  No Known Allergies  Current Facility-Administered Medications   Medication Frequency    0.9%  NaCl infusion (for blood administration) Q24H PRN    acetaminophen tablet 650 mg Q6H PRN    albumin human 5% bottle 500 mL PRN    albuterol  nebulizer solution 2.5 mg Q4H PRN    amiodarone 360 mg/200 mL (1.8 mg/mL) infusion Continuous    bisacodyl suppository 10 mg Daily PRN    cefepime in dextrose 5 % 1 gram/50 mL IVPB 1 g Q12H    dextrose 50% injection 12.5 g PRN    DOBUtamine 1000 mg in D5W 250 mL infusion (premix non-titrating) Continuous    DOPamine 400 mg in dextrose 5 % 250 mL infusion (premix) (NON-TITRATING) Continuous    EPINEPHrine (ADRENALIN) 4 mg in sodium chloride 0.9% 250 mL infusion Continuous    fentaNYL injection 50 mcg Q4H PRN    glucagon (human recombinant) injection 1 mg PRN    hydrALAZINE injection 10 mg Q6H PRN    insulin aspart pen 0-5 Units Q4H PRN    nitric oxide gas Gas 5 ppm Continuous    norepinephrine 4 mg in dextrose 5% 250 mL infusion (premix) (titrating) Continuous    ondansetron injection 4 mg Q6H PRN    pantoprazole injection 40 mg BID    propofol (DIPRIVAN) 10 mg/mL infusion Continuous    sodium chloride 0.9% flush 10 mL Q6H    And    sodium chloride 0.9% flush 10 mL PRN    TPN ADULT CENTRAL LINE CUSTOM Continuous    TPN ADULT CENTRAL LINE CUSTOM Continuous    vasopressin (PITRESSIN) 100 Units in dextrose 5 % 100 mL infusion Continuous       Objective:     Vital Signs (Most Recent):  Temp: 98.9 °F (37.2 °C) (08/11/17 1100)  Pulse: 80 (08/11/17 1310)  Resp: 13 (08/11/17 1310)  BP: (!) 78/0 (08/11/17 0900)  SpO2: 100 % (08/11/17 1310)  O2 Device (Oxygen Therapy): ventilator (08/11/17 1300) Vital Signs (24h Range):  Temp:  [98.6 °F (37 °C)-100.3 °F (37.9 °C)] 98.9 °F (37.2 °C)  Pulse:  [79-80] 80  Resp:  [13-26] 13  SpO2:  [96 %-100 %] 100 %  BP: (62-92)/(0-66) 78/0  Arterial Line BP: (62-95)/(50-89) 80/62     Weight: 88 kg (194 lb 0.1 oz) (08/11/17 1300)  Body mass index is 29.5 kg/m².  Body surface area is 2.05 meters squared.    I/O last 3 completed shifts:  In: 5520.4 [I.V.:3893]  Out: 3745 [Urine:1895; Drains:1850]    Physical Exam   Constitutional: He appears well-developed and well-nourished.  No distress. He is intubated.   Able to follow commands     HENT:   Head: Normocephalic and atraumatic.   Right Ear: External ear normal.   Left Ear: External ear normal.   Eyes: Conjunctivae and EOM are normal. Right eye exhibits no discharge. Left eye exhibits no discharge.   Neck: Normal range of motion. Neck supple.   Cardiovascular: Normal rate and regular rhythm.    LVAD hum   Pulmonary/Chest: Breath sounds normal. He is intubated. No respiratory distress. He has no wheezes. He has no rales.   Abdominal: Soft. Bowel sounds are normal. He exhibits no distension.   Musculoskeletal: He exhibits edema (trace).   Neurological: He is alert.   Skin: Skin is warm and dry. He is not diaphoretic.       Significant Labs:  ABGs:   Recent Labs  Lab 08/11/17  0803   PH 7.510*   PCO2 37.3   HCO3 29.8*   POCSATURATED 99   BE 7     CBC:   Recent Labs  Lab 08/11/17  1057   WBC 12.14   RBC 2.98*   HGB 8.0*   HCT 23.5*      MCV 79*   MCH 26.8*   MCHC 34.0     CMP:   Recent Labs  Lab 08/11/17  1057   *   CALCIUM 7.9*   ALBUMIN 2.5*   PROT 5.2*      K 3.5   CO2 25      BUN 81*   CREATININE 2.1*   ALKPHOS 90   ALT 46*   AST 48*   BILITOT 2.4*            Assessment/Plan:     Acute kidney injury superimposed on CKD    INDIRA on CKD III  -INDIRA appears to be multifactorial, Ischemic ATN from renal hypoperfusion vs. Septic ATN.  -Baseline SCr appears to be around 1.2-1.3.  He has had multiple INDIRA's in the past, likely 2/2 to poor renal perfusion from cardiomyopathy.  -had initial insult on the day of LVAD surgery on the 27th with a spike in his SCr, but it quickly resolved and returned to baseline.  His SCr stayed stable until the 1st when it increased to 1.5 and has continued to trend up until 2.3 today.   -Signs of decreased perfusion occurred on July 31st with reported drops in LVAD Flows which resolved with decrease in lasix dose and 1 unit of PRBC infusion.   -Urine Na 21.  Would expect a higher urinary sodium  given that patient is on lasix infusion.  Appears that patiens is intravascularly volume depleted.  Recommend holding lasix at this time.  -currently no acute indication for RRT.  Will continue following patient.            Alfonzo Medina NP  Nephrology  [facility]  Pager:  [phone]

## 2017-08-11 NOTE — ASSESSMENT & PLAN NOTE
- LVAD HM III. Placed this admit 7/27/17  - CTS Primary  - chest closure (7/28/17) and extubated (7/29/17)  - Speed 5200  - LDH stable  - INR supratherputic now, hold coumadin   -Reintubated 8/9/17. Pressors and extubation per CTS   -Echo did not show pericardial effusion

## 2017-08-11 NOTE — PROGRESS NOTES
Dr. Espinoza and [place] notified that only one blood culture able to be obtained. Okayed with MD. MD also aware that pts left arm swollen where Acampo is, Acampo waveform positional. Doppler pressures being obtained. Dr. Downing no longer wants doppler pressure frequently s/t swelling. Plan for Dr. Espinoza to place new luca and do not pull left arterial line until new one is obtained. Plan of care discussed. Will continue to monitor pt closely

## 2017-08-11 NOTE — PROGRESS NOTES
Dr. Tran made aware, left arterial line with little to no waveform, extremely sluggish pullback, flushes easily, area above site with some localized edema.   MD states will come to bedside to assess need for line replacement.

## 2017-08-11 NOTE — PROGRESS NOTES
Daily E and M and VAD Interrogation Note    Reason for Visit:  Patient is seen in follow up for management of:  [] HeartMate II  [] Heartware [] Total artificial heart       [] ECMO           [x] Other - HM III     Interval History:  [] Interval history unobtainable due to intubation.  The [x] implant/[] explant date was 7/27/17    Events - No acute events o/n. Decreasing vasopressor requirements this AM. Intubated on minimal vent settings. Adequate UOP off lasix. Improving coagulopathy.        Review of Systems:   Negative except as above.      Medications:  Current Facility-Administered Medications   Medication Dose Route Frequency Provider Last Rate Last Dose    0.9%  NaCl infusion (for blood administration)   Intravenous Q24H PRN Shayne Espinoza MD        acetaminophen tablet 650 mg  650 mg Oral Q6H PRN Shayne Espinoza MD   650 mg at 08/08/17 2122    albumin human 5% bottle 500 mL  500 mL Intravenous PRN Shayne Espinoza MD   500 mL at 08/09/17 0700    albuterol nebulizer solution 2.5 mg  2.5 mg Nebulization Q4H PRN Shayne Espinoza MD        amiodarone 360 mg/200 mL (1.8 mg/mL) infusion  0.5 mg/min Intravenous Continuous Shayne Espinoza MD   Stopped at 08/08/17 1500    bisacodyl suppository 10 mg  10 mg Rectal Daily PRN Shayne Espinoza MD   10 mg at 08/06/17 2036    cefepime in dextrose 5 % 1 gram/50 mL IVPB 1 g  1 g Intravenous Q12H Shayne Espinoza  mL/hr at 08/11/17 0748 1 g at 08/11/17 0748    dextrose 50% injection 12.5 g  12.5 g Intravenous PRN Charbel Ritter MD        DOBUtamine 1000 mg in D5W 250 mL infusion (premix non-titrating)  5 mcg/kg/min (Dosing Weight) Intravenous Continuous Shayne Espinoza MD 6 mL/hr at 08/09/17 0126 5 mcg/kg/min at 08/09/17 0126    DOPamine 400 mg in dextrose 5 % 250 mL infusion (premix) (NON-TITRATING)  4 mcg/kg/min (Dosing Weight) Intravenous Continuous Shayne Espinoza MD 15 mL/hr at 08/11/17 1600 5 mcg/kg/min at 08/11/17  1600    EPINEPHrine (ADRENALIN) 4 mg in sodium chloride 0.9% 250 mL infusion  0.02 mcg/kg/min (Dosing Weight) Intravenous Continuous Shayne Espinoza MD 12 mL/hr at 08/11/17 1600 0.04 mcg/kg/min at 08/11/17 1600    fentaNYL injection 50 mcg  50 mcg Intravenous Q4H PRN Shayne Espinoza MD   50 mcg at 08/11/17 1131    glucagon (human recombinant) injection 1 mg  1 mg Intramuscular PRN Charbel Ritter MD        hydrALAZINE injection 10 mg  10 mg Intravenous Q6H PRN Shayne Espinoza MD   10 mg at 08/08/17 1509    insulin aspart pen 0-5 Units  0-5 Units Subcutaneous Q4H PRN Charbel Ritter MD   2 Units at 08/10/17 0751    nitric oxide gas Gas 5 ppm  5 ppm Inhalation Continuous Sunny Downing MD   5 ppm at 08/09/17 1200    norepinephrine 4 mg in dextrose 5% 250 mL infusion (premix) (titrating)  0.02 mcg/kg/min (Dosing Weight) Intravenous Continuous Shayne Espinoza MD   Stopped at 08/09/17 1200    ondansetron injection 4 mg  4 mg Intravenous Q6H PRN Shayne Espinoza MD   4 mg at 08/08/17 2112    pantoprazole injection 40 mg  40 mg Intravenous BID Shayne Espinoza MD   40 mg at 08/11/17 1115    propofol (DIPRIVAN) 10 mg/mL infusion  5 mcg/kg/min (Dosing Weight) Intravenous Continuous Shayne Espinoza MD   Stopped at 08/11/17 0545    sodium chloride 0.9% flush 10 mL  10 mL Intravenous Q6H Sunny Downing MD   10 mL at 08/11/17 1123    And    sodium chloride 0.9% flush 10 mL  10 mL Intravenous PRN Sunny Downing MD   10 mL at 08/10/17 1151    TPN ADULT CENTRAL LINE CUSTOM   Intravenous Continuous Avery Crystal MD 50 mL/hr at 08/11/17 1600      TPN ADULT CENTRAL LINE CUSTOM   Intravenous Continuous Vince Murguia MD        vasopressin (PITRESSIN) 100 Units in dextrose 5 % 100 mL infusion  0.04 Units/min Intravenous Continuous Shayne Espinoza MD 1.2 mL/hr at 08/11/17 1600 0.02 Units/min at 08/11/17 1600     Physical Examination:  Vital Signs:   Vitals:    08/11/17 1638   BP:     Pulse: 79   Resp: 18   Temp:      Cardiovascular:  [x] Regular rate and rhythm [] Irregular []  None (MATT) []  Other  []  No edema [x]  Edema present  [x]  Clear to auscultation  []  Rales to []  Coarse  []  No rales but   [] Pedal Pulses absent  [x]  Pulses  + throughout  Skin:  Incision is [x]  Clean, dry and intact.  []  Other   Sternum:  [x]  Stable []  Unstable  Driveline(s):   [x]  Clean, dry and intact. []  Other     Labs:  BMP  Lab Results   Component Value Date     08/11/2017    K 3.5 08/11/2017     08/11/2017    CO2 25 08/11/2017    BUN 81 (H) 08/11/2017    CREATININE 2.1 (H) 08/11/2017    CALCIUM 7.9 (L) 08/11/2017    ANIONGAP 12 08/11/2017    ESTGFRAFRICA 36.6 (A) 08/11/2017    EGFRNONAA 31.6 (A) 08/11/2017     Magnesium   Date Value Ref Range Status   08/11/2017 2.1 1.6 - 2.6 mg/dL Final     Lab Results   Component Value Date    WBC 12.14 08/11/2017    HGB 8.0 (L) 08/11/2017    HCT 23.5 (L) 08/11/2017    MCV 79 (L) 08/11/2017     08/11/2017     Lab Results   Component Value Date    INR 5.8 (HH) 08/11/2017    INR 7.3 (HH) 08/10/2017    INR 6.4 (HH) 08/10/2017     BNP   Date Value Ref Range Status   08/11/2017 2,609 (H) 0 - 99 pg/mL Final     Comment:     Values of less than 100 pg/ml are consistent with non-CHF populations.   08/09/2017 1,232 (H) 0 - 99 pg/mL Final     Comment:     Values of less than 100 pg/ml are consistent with non-CHF populations.   08/09/2017 1,232 (H) 0 - 99 pg/mL Final     Comment:     Values of less than 100 pg/ml are consistent with non-CHF populations.     LD   Date Value Ref Range Status   08/11/2017 207 110 - 260 U/L Final     Comment:     Results are increased in hemolyzed samples.   08/10/2017 319 (H) 110 - 260 U/L Final     Comment:     Results are increased in hemolyzed samples.   08/09/2017 335 (H) 110 - 260 U/L Final     Comment:     Results are increased in hemolyzed samples.     X-Rays:  [x]  I reviewed today's Chest x-ray    Procedure:  Device  Interrogation including analysis of device parameters.  Current Settings   [x]  Ventricular Assist Device  []  Total Artificial Heart interrogated  Review of device function is [x]  Stable []  Other   TXP LVAD INTERROGATIONS 8/11/2017 8/11/2017 8/11/2017 8/11/2017 8/11/2017 8/11/2017 8/11/2017   Type HeartMate3 HeartMate3 HeartMate3 HeartMate3 HeartMate3 HeartMate3 HeartMate3   Flow 4.2 4.3 4.2 4.1 4.1 4.2 4.2   Speed 5100 5100 5100 5100 5100 5100 5100   PI 3.3 3.5 3.5 3.5 3.4 3.5 3.5   Power ([place]) 3.5 3.5 3.4 3.4 3.4 3.4 3.4   LSL 4700 4700 4700 4700 4700 4700 4700   Pulsatility Pulse Pulse Pulse Pulse Pulse Pulse Pulse   Some recent data might be hidden       Assessment:  [x]  Primary Cardiomyopathy []  Congestive Heart Failure   []  Atrial Fibrillation []  Ventricular Tachycardia   []  Aftercare cardiac device []  Long term (current) use of anticoagulants   []  Ventilator-associated pneumonia []  Pneumonia viral, unspecified   []  Pneumonia, bacterial, unspecified []  Pneumonia, organism unspecified   []  Hemorrhage of GI tract, unspecified    []  Nosebleed []  Driveline infection   []  Infection VAD device []  New onset of    []        Plan:  [x]  Interval history obtained from ICU attending team member during rounding today  []  VAD/MATT teaching performed with patient  []  Mobilization / Physical Therapy ongoing  []  Anticoagulation []  Ongoing []  Held  []  Studies ordered  []   To OR today for closure.       Total time spent was 30 minutes.  Of which more than 50 percent of the care dominated counseling and coordinating care with different team members. The VAD was interrogated and all parameters were WNL and no significant findings were found in the history. All these findings are documented in the note above.    Neuro:  - Intubated, sedated  - Alert and responsive when aroused   - Wean sedation as tolerated     Resp:  - Intubated: Vent Mode: Spont  Oxygen Concentration (%):  [50] 50  Resp Rate Total:  [16  br/min-22 br/min] 17 br/min  Vt Set:  [500 mL] 500 mL  PEEP/CPAP:  [5 cmH20] 5 cmH20  Pressure Support:  [10 cmH20] 10 cmH20  Mean Airway Pressure:  [8.1 ivH97-01 cmH20] 8.3 cmH20  - Minimize vent settings  - Possible aspiration event causing sepsis - resp cultures sent and empiric abx started.     CV:  - HDS; LVAD numbers stable  -   - Amio gtt stopped  - Dobutamine off  - Dopamine, epi, levo, vaso drips on   - Hold  given GI bleed    Heme:  - Hgb/Hct 9/27  - Continue to trend  - INR7.3 on repeat this AM - Hold Coumadin  - Heparin off  ID:  - afebrile  - WBC 9.9  - Likely aspiration pneumonia - empiric cefepime and diflucan   - continue to monitor     Renal:  - Singer in place  - Strict I/Os   - Adequate UOP  - Lasix gtt off  - Cr 2.2    FEN/GI:  - Strict NPO  - Stop bowel regimen and all other PO meds given GI bleed  - Protonix 40 BID  - Replace lytes PRN    Endo:  - Endocrine following, appreciate assistance  - Accuchecks  - Insulin ssi    Dispo:  - Continue ICU care.       Shayne Espinoza MD  General Surgery PGY-2  Pager: [phone]    Cardiac Surgery Attending E and M (VAD) Note along with VAD Interrogation    I have seen and examined the patient and agree with the findings above    I also reviewed the patients clinical course and:  [x]  Hemodynamic & Respiratory paramters  [x]  Laboratory Data  [x]  Radiological studies     VAD Interrogated [x]      VAD Function is normal. Changes made []  None [x]        Interrogation of Ventricular assist device was performed with physician analysis of device parameters and review of device function. I have personally reviewed the interrogation findings and agree with findings as stated

## 2017-08-11 NOTE — PROCEDURES
TXP LVAD INTERROGATIONS 8/11/2017 8/11/2017 8/11/2017 8/11/2017 8/11/2017 8/11/2017 8/11/2017   Type HeartMate3 HeartMate3 HeartMate3 HeartMate3 HeartMate3 HeartMate3 HeartMate3   Flow 4.2 4.1 4.1 4.2 4.2 4.1 4.2   Speed 5100 5100 5100 5100 5100 5100 5100   PI 3.5 3.5 3.4 3.5 3.5 3.5 3.5   Power ([place]) 3.4 3.4 3.4 3.4 3.4 3.4 3.4   LSL 4700 4700 4700 4700 4700 4700 4700   Pulsatility Pulse Pulse Pulse Pulse Pulse Pulse Pulse   Some recent data might be hidden     Pt sedated, intubated.  Wife AAAO.  No questions for us at this time.  Will continue with VAD education Monday.   No alarms noted in history

## 2017-08-11 NOTE — PROGRESS NOTES
Progress Note  Surgical Intensive Care    Admit Date: 7/18/2017  Post-operative Day: 14 Days Post-Op  Hospital Day: 25    SUBJECTIVE:     Follow-up For:  Procedure(s) (LRB):  CLOSURE-STERNAL WOUND (N/A)  PLACEMENT-NEOPERICARDIUM (N/A)   S/p sternotomy closure 7/28/17    Interval history: No acute events o/n. On dobamine 4, epi 0.04, and vaso at 0.02. Intubated on minimal vent settings. UOP increased over last shift. INR down to 5.8.      Continuous Infusions:   amiodarone Stopped (08/08/17 1500)    DOBUTamine 5 mcg/kg/min (08/09/17 0126)    DOPamine 5 mcg/kg/min (08/11/17 0900)    epinephrine infusion 0.04 mcg/kg/min (08/11/17 1000)    nitric oxide gas      norepinephrine bitartrate-D5W Stopped (08/09/17 1200)    propofol Stopped (08/11/17 0545)    TPN ADULT CENTRAL LINE CUSTOM 50 mL/hr at 08/11/17 1000    TPN ADULT CENTRAL LINE CUSTOM      vasopressin (PITRESSIN) infusion 0.02 Units/min (08/11/17 1000)     Scheduled Meds:   ceFEPime (MAXIPIME) IVPB  1 g Intravenous Q12H    pantoprazole  40 mg Intravenous BID    sodium chloride 0.9%  10 mL Intravenous Q6H     PRN Meds:sodium chloride, acetaminophen, albumin human 5%, albuterol sulfate, bisacodyl, dextrose 50%, fentaNYL, glucagon (human recombinant), hydrALAZINE, insulin aspart, ondansetron, Flushing PICC Protocol **AND** sodium chloride 0.9% **AND** sodium chloride 0.9%    Review of patient's allergies indicates:  No Known Allergies    OBJECTIVE:     Vital Signs (Most Recent)  Temp: 98.8 °F (37.1 °C) (08/11/17 0700)  Pulse: 79 (08/11/17 1015)  Resp: 17 (08/11/17 1015)  BP: (!) 78/0 (08/11/17 0900)  SpO2: 100 % (08/11/17 1015)    Vital Signs Range (Last 24H):  Temp:  [98.6 °F (37 °C)-100.3 °F (37.9 °C)]   Pulse:  [79-80]   Resp:  [14-26]   BP: (62-92)/(0-66)   SpO2:  [96 %-100 %]   Arterial Line BP: (62-95)/(50-89)     I & O (Last 24H):    Intake/Output Summary (Last 24 hours) at 08/11/17 1029  Last data filed at 08/11/17 1000   Gross per 24 hour    Intake          4135.37 ml   Output             2760 ml   Net          1375.37 ml        Physical Exam   Constitutional: He appears well-developed and well-nourished. No distress. He is intubated. He is alert and responding to commands.  HENT:   Head: Normocephalic and atraumatic.   Eyes: Right eye exhibits no discharge. Left eye exhibits no discharge. No scleral icterus.   Neck: Normal range of motion. Neck supple.   Cardiovascular: Intact distal pulses.    LVAD hum present.   Pulmonary/Chest: Effort normal. No respiratory distress.   R and L meds chest tubes in place- minimal serosanguinous drainage.  Abdominal: Soft. He exhibits no distension.   Skin: Skin is warm and dry.     Laboratory (Last 24H):  CBC:    Recent Labs  Lab 08/10/17  2334 08/11/17  0507   WBC 10.10  --    HGB 8.2* 8.0*   HCT 23.5* 23.0*     --      CMP:    Recent Labs  Lab 08/11/17  0507   CALCIUM 7.6*   ALBUMIN 2.7*   PROT 5.2*      K 3.2*   CO2 24      BUN 79*   CREATININE 2.2*   ALKPHOS 95   ALT 53*   AST 58*   BILITOT 2.1*       ASSESSMENT/PLAN:     Neuro:  - Intubated  - alert and responding to commands  - wean sedation as tolerated when dennis to extubate     Resp:  - Intubated:   Vent Mode: Spont  Oxygen Concentration (%):  [50] 50  Resp Rate Total:  [16 br/min-26 br/min] 18 br/min  Vt Set:  [500 mL] 500 mL  PEEP/CPAP:  [5 cmH20] 5 cmH20  Pressure Support:  [10 cmH20] 10 cmH20  Mean Airway Pressure:  [8.1 ooY67-75 cmH20] 8.1 cmH20  - on minimal vent settings  - extubate once INR has normalized  - Possible aspiration event causing sepsis - resp cultures sent and empiric abx started.      CV:  - HDS; LVAD numbers stable  - Dopamine, epi, vaso drips on, wean as tolerated   - Hold  given GI bleed     Heme:  - Hgb/Hct 9/26   - Continue to trend  - INR down to 5.8 - Hold Coumadin, no plan for Vitamin K now  - Heparin off    ID:  - afebrile  - WBC 9.9  - Likely aspiration pneumonia - empiric cefepime and diflucan   -  continue to monitor  - GNR in blood cx, f/u on speciation  - new blood cx drawn this AM     Renal:  - Singer in place  - Strict I/Os   - UOP increased over last shift, continue to monitor closely  - Lasix gtt at 10 mg/hr  - Cr 2.2     FEN/GI:  - Strict NPO  - Stop bowel regimen and all other PO meds given GI bleed  - Protonix gtt  - Replace lytes PRN     Endo:  - Endocrine following  - Accuchecks  - SSI     Dispo:  - Continue ICU care    Vince Murguia PGY-1  [phone]  08/11/2017

## 2017-08-11 NOTE — ASSESSMENT & PLAN NOTE
INDIRA on CKD III  -INDIRA appears to be multifactorial, Ischemic ATN from renal hypoperfusion vs. Septic ATN.  -Baseline SCr appears to be around 1.2-1.3.  He has had multiple INDIRA's in the past, likely 2/2 to poor renal perfusion from cardiomyopathy.  -had initial insult on the day of LVAD surgery on the 27th with a spike in his SCr, but it quickly resolved and returned to baseline.  His SCr stayed stable until the 1st when it increased to 1.5 and has continued to trend up until 2.3 today.   -Signs of decreased perfusion occurred on July 31st with reported drops in LVAD Flows which resolved with decrease in lasix dose and 1 unit of PRBC infusion.   -Urine Na 21.  Would expect a higher urinary sodium given that patient is on lasix infusion.  Appears that patiens is intravascularly volume depleted.  Recommend holding lasix at this time.  -currently no acute indication for RRT.  Will continue following patient.

## 2017-08-11 NOTE — PROGRESS NOTES
PROTOCOL: 2016.272.C SAFE-ICU (Screening Anti-Fungal Exposure in ICU)  : Yayo Florence MD  Consent From: Kia Hayden (Wife)  DATE: 8/10/2017     * Met with patient and family to discuss possible participation in a research study.   * Patient confirmed to meet the criteria for participation in this study.   * LAR was given a copy of the informed consent form for review.   * LAR read the informed consent form in full.   * Informed consent form was read in full to the LAR.   * All risks, benefits, alternative therapies, confidentiality, and study requirements were discussed.   * Ample opportunity was provided for questions and answers.   * LAR verbalized that all questions were satisfactorily answered .  * LAR verbalized understanding of the protocol and its requirements.   * LAR was provided with MD contact information for future questions or concerns.   * LAR denied involvement of patient in any other research study.   * LAR voluntarily agreed to have patient participate in the research study.   * Informed consent was signed by LAR and LAR was provided with a signed consent form for patient records.   * Consent was obtained prior to conducting any study-related procedures.      COMMENTS: Patient withdrawn from study due inadequate vascular access for sampling.    Signed,     SONJA Martinez  [id number]

## 2017-08-11 NOTE — SUBJECTIVE & OBJECTIVE
Interval History: Intubated. Sedated but arousable and following commands    Continuous Infusions:   amiodarone Stopped (08/08/17 1500)    DOBUTamine 5 mcg/kg/min (08/09/17 0126)    DOPamine 5 mcg/kg/min (08/11/17 1500)    epinephrine infusion 0.04 mcg/kg/min (08/11/17 1500)    nitric oxide gas      norepinephrine bitartrate-D5W Stopped (08/09/17 1200)    propofol Stopped (08/11/17 0545)    TPN ADULT CENTRAL LINE CUSTOM 50 mL/hr at 08/11/17 1500    TPN ADULT CENTRAL LINE CUSTOM      vasopressin (PITRESSIN) infusion 0.02 Units/min (08/11/17 1500)     Scheduled Meds:   ceFEPime (MAXIPIME) IVPB  1 g Intravenous Q12H    pantoprazole  40 mg Intravenous BID    sodium chloride 0.9%  10 mL Intravenous Q6H     PRN Meds:sodium chloride, acetaminophen, albumin human 5%, albuterol sulfate, bisacodyl, dextrose 50%, fentaNYL, glucagon (human recombinant), hydrALAZINE, insulin aspart, ondansetron, Flushing PICC Protocol **AND** sodium chloride 0.9% **AND** sodium chloride 0.9%    Review of patient's allergies indicates:  No Known Allergies  Objective:     Vital Signs (Most Recent):  Temp: 99.1 °F (37.3 °C) (08/11/17 1500)  Pulse: 79 (08/11/17 1500)  Resp: 17 (08/11/17 1500)  BP: (!) 80/0 (08/11/17 1500)  SpO2: 100 % (08/11/17 1500) Vital Signs (24h Range):  Temp:  [98.6 °F (37 °C)-100.3 °F (37.9 °C)] 99.1 °F (37.3 °C)  Pulse:  [79-80] 79  Resp:  [13-26] 17  SpO2:  [96 %-100 %] 100 %  BP: (70-92)/(0-66) 80/0  Arterial Line BP: (62-95)/(50-89) 69/56     Weight: 88 kg (194 lb 0.1 oz)  Body mass index is 29.5 kg/m².      Intake/Output Summary (Last 24 hours) at 08/11/17 1511  Last data filed at 08/11/17 1500   Gross per 24 hour   Intake          4135.37 ml   Output             3140 ml   Net           995.37 ml       Hemodynamic Parameters:           Physical Exam   Constitutional: He is oriented to person, place, and time. He appears well-developed and well-nourished. No distress. He is sedated and intubated.   HENT:    Head: Normocephalic and atraumatic.   Eyes: EOM are normal. Pupils are equal, round, and reactive to light.   Neck: Normal range of motion. Neck supple. No JVD present.   Cardiovascular: Normal rate and regular rhythm.  Exam reveals no gallop and no friction rub.    No murmur heard.  + LVAD hum    Pulmonary/Chest: Effort normal and breath sounds normal. He is intubated. No respiratory distress. He has no wheezes. He has no rales.   Abdominal: Soft. He exhibits no distension. There is no tenderness. There is no guarding.   Musculoskeletal: Normal range of motion. He exhibits no edema.   Neurological: He is oriented to person, place, and time. No cranial nerve deficit. Coordination normal.   Skin: Skin is warm and dry. He is not diaphoretic. No erythema.   Nursing note and vitals reviewed.      Significant Labs:  CBC:    Recent Labs  Lab 08/11/17  1057   WBC 12.14   RBC 2.98*   HGB 8.0*   HCT 23.5*      MCV 79*   MCH 26.8*   MCHC 34.0     BNP:    Recent Labs  Lab 08/11/17  0507   BNP 2,609*     CMP:    Recent Labs  Lab 08/11/17  1057   *   CALCIUM 7.9*   ALBUMIN 2.5*   PROT 5.2*      K 3.5   CO2 25      BUN 81*   CREATININE 2.1*   ALKPHOS 90   ALT 46*   AST 48*   BILITOT 2.4*      Coagulation:     Recent Labs  Lab 08/11/17  0507   INR 5.8*   APTT 61.9*     LDH:    Recent Labs  Lab 08/09/17  0349 08/10/17  0501 08/11/17  0507   * 319* 207     Microbiology:  Microbiology Results (last 7 days)     Procedure Component Value Units Date/Time    Blood culture [[id number]] Collected:  08/11/17 1326    Order Status:  Sent Specimen:  Blood from Peripheral, Forearm, Left Updated:  08/11/17 1327    Blood culture [[id number]] Collected:  08/11/17 1326    Order Status:  Sent Specimen:  Blood from Peripheral, Forearm, Left Updated:  08/11/17 1327    Blood culture [[id number]] Collected:  08/09/17 0813    Order Status:  Completed Specimen:  Blood from Line, Arterial, Right Updated:  08/11/17 1238      Blood Culture, Routine Gram stain clement bottle: Gram negative rods      Blood Culture, Routine Results called to and read back by: Emma York RN 08/10/2017  11:13     Blood Culture, Routine --     ESCHERICHIA COLI  Susceptibility pending      Culture, Respiratory with Gram Stain [[id number]]  (Susceptibility) Collected:  08/09/17 0723    Order Status:  Completed Specimen:  Respiratory from Endotracheal Aspirate Updated:  08/11/17 1153     Respiratory Culture --     ESCHERICHIA COLI ESBL  Many       Gram Stain (Respiratory) <10 epithelial cells per low power field.     Gram Stain (Respiratory) Few WBC's     Gram Stain (Respiratory) Many Gram negative rods     Gram Stain (Respiratory) Few Gram positive rods     Gram Stain (Respiratory) Few Gram positive cocci    Blood culture [[id number]] Collected:  08/09/17 0733    Order Status:  Completed Specimen:  Blood from Peripheral, Antecubital, Left Updated:  08/11/17 1022     Blood Culture, Routine No Growth to date     Blood Culture, Routine No Growth to date     Blood Culture, Routine No Growth to date    Urine culture [[id number]] Collected:  08/09/17 0725    Order Status:  Completed Specimen:  Urine from Urine, Catheterized Updated:  08/10/17 1128     Urine Culture, Routine No growth    Blood culture [[id number]] Collected:  08/04/17 1653    Order Status:  Completed Specimen:  Blood from Peripheral, Wrist, Right Updated:  08/09/17 2012     Blood Culture, Routine No growth after 5 days.    Blood culture [[id number]] Collected:  08/04/17 1654    Order Status:  Completed Specimen:  Blood from Peripheral, Wrist, Left Updated:  08/09/17 2012     Blood Culture, Routine No growth after 5 days.    Urine culture [[id number]] Collected:  08/04/17 1655    Order Status:  Completed Specimen:  Urine from Urine, Catheterized Updated:  08/05/17 2116     Urine Culture, Routine No growth          I have reviewed all pertinent labs within the past 24 hours.    Estimated Creatinine Clearance:  36.8 mL/min (based on Cr of 2.1).    Diagnostic Results:  I have reviewed and interpreted all pertinent imaging results/findings within the past 24 hours.

## 2017-08-11 NOTE — PROGRESS NOTES
Wife requested to do VAD dressing change. RN assisted and directed wife on proper sterile technique throughout procedure. Wife set up sterile field, removed dressing with clean gloves. Site pink, edges approximated, no redness, sutures remain intact. Minimal serous drainage. Site cleansed with sterile normal saline, chlorhexidine, and dried with sterile gauze. Wife instructed to initial, date, and time dressing.

## 2017-08-11 NOTE — PROGRESS NOTES
Dr. Downing on phone, update given. Aware of need of arterial line replacement, Vaso weaned to 0.03 mcg/min based on hourly doppler pressures. Orders to change ABG with lactate frequency to q8. Patient will be placed on CPAP at 0600.

## 2017-08-11 NOTE — PT/OT/SLP PROGRESS
Physical Therapy  Treatment    Suman Hayden   MRN: [id number]   Admitting Diagnosis: Acute on chronic combined systolic and diastolic heart failure    PT Received On: 08/11/17  PT Start Time: 1344     PT Stop Time: 1357    PT Total Time (min): 13 min       Billable Minutes:  Therapeutic Exercise 13 min    Treatment Type: Treatment  PT/PTA: PT     PTA Visit Number: 0       General Precautions: Standard, LVAD, fall, sternal  Orthopedic Precautions: N/A   Braces:      Do you have any cultural, spiritual, Adventist conflicts, given your current situation?: none noted     Subjective:  Communicated with nurse prior to session.      Pain/Comfort  Pain Rating 1: 0/10  Pain Rating Post-Intervention 1: 0/10    Objective:   Patient found with: telemetry, arterial line, pulse ox (continuous), delgado catheter, ventilator, restraints, PICC line, central line (LVAD, CVP, vent to ET tube, BUE restraints)    Functional Mobility:  Bed Mobility:   Rolling/Turning to Left:  (not tested. RN stated that pt was on 3 pressors and approved ther exer in bed.)    Transfers:  Sit <> Stand Assistance:  (not tested. see above)    Gait:   Gait Distance: not tested. see above      Therapeutic Activities and Exercises:  Pt performed AAROM there exer x 4 extremities x 15 reps in supine. Pt was returned to BUE restraints after treatment.      AM-PAC 6 CLICK MOBILITY  How much help from another person does this patient currently need?   1 = Unable, Total/Dependent Assistance  2 = A lot, Maximum/Moderate Assistance  3 = A little, Minimum/Contact Guard/Supervision  4 = None, Modified [place]/Independent    Turning over in bed (including adjusting bedclothes, sheets and blankets)?: 2  Sitting down on and standing up from a chair with arms (e.g., wheelchair, bedside commode, etc.): 1  Moving from lying on back to sitting on the side of the bed?: 2  Moving to and from a bed to a chair (including a wheelchair)?: 1  Need to walk in hospital room?:  1  Climbing 3-5 steps with a railing?: 1  Total Score: 8    AM-PAC Raw Score CMS G-Code Modifier Level of Impairment Assistance   6 % Total / Unable   7 - 9 CM 80 - 100% Maximal Assist   10 - 14 CL 60 - 80% Moderate Assist   15 - 19 CK 40 - 60% Moderate Assist   20 - 22 CJ 20 - 40% Minimal Assist   23 CI 1-20% SBA / CGA   24 CH 0% Independent/ Mod I     Patient left supine with all lines intact, call button in reach and wife present.    Assessment:  Suman Hayden is a 67 y.o. male with a medical diagnosis of Acute on chronic combined systolic and diastolic heart failure and presents with decreased strength, mobility, transfers, balance and decreased distance ambulated. Pt would benefit from cont PT to address deficits and will need HHPT. Pt will benefit from skilled PT 6x/wk to return to Independent status. Pt is s/p LVAD HM3 placement , chest closure , re-intubated 17.    Rehab identified problem list/impairments: Rehab identified problem list/impairments: weakness, impaired endurance, impaired functional mobilty, gait instability, impaired balance, decreased lower extremity function    Rehab potential is good.    Activity tolerance: Good    Discharge recommendations: Discharge Facility/Level Of Care Needs: home health PT     Barriers to discharge: Barriers to Discharge: None    Equipment recommendations: Equipment Needed After Discharge: none     GOALS:    Physical Therapy Goals        Problem: Physical Therapy Goal    Goal Priority Disciplines Outcome Goal Variances Interventions   Physical Therapy Goal     PT/OT, PT Ongoing (interventions implemented as appropriate)     Description:  Goals to be met by: 17     Patient will increase functional independence with mobility by performin. Supine to sit with MInimal Assistance- not met  2. Sit to supine with MInimal Assistance - not met  3. Sit to stand transfer with Minimal Assistance- not met  4. Bed to chair transfer with Minimal  Assistance- not met  5. Gait  x 50 feet with Minimal Assistance. - not met  6. Lower extremity exercise program x15 reps, with supervision, in order to increase LE strength and (I) with functional mobility. - not met                        PLAN:    Patient to be seen 6 x/week  to address the above listed problems via gait training, therapeutic activities, therapeutic exercises  Plan of Care expires: 09/09/17  Plan of Care reviewed with: patient, spouse         Astrid Whaley, PT  08/11/2017

## 2017-08-11 NOTE — PLAN OF CARE
Problem: Patient Care Overview  Goal: Plan of Care Review  Outcome: Ongoing (interventions implemented as appropriate)  No acute events throughout shift  Vaso at 0.02, epi at 0.04, dopamine at 5, TPN at 50  Plan is to wean to extubate based on pt's tolerance  Vent set on spontaneous 50% & 5 PEEP  CT planned for tomorrow; head, chest, & abdomen  Nitric weaned off  Urine output /hour   Pt remained hemodynamically stable throughout shift  Will continue to monitor closely

## 2017-08-11 NOTE — PROGRESS NOTES
Dr. Downing on phone, update given. Patient was placed back on vent AC for the night, and will be weaned back to CPAP as tolerated again in the AM.

## 2017-08-11 NOTE — PROGRESS NOTES
[facility]  Heart Transplant  Progress Note    Patient Name: Suman Hayden  MRN: [id number]  Admission Date: 7/18/2017  Hospital Length of Stay: 24 days  Attending Physician: Sunny Downing MD  Primary Care Provider: Joe Ernst MD  Principal Problem:Acute on chronic combined systolic and diastolic heart failure    Subjective:     Interval History: Intubated. Sedated but arousable and following commands    Continuous Infusions:   amiodarone Stopped (08/08/17 1500)    DOBUTamine 5 mcg/kg/min (08/09/17 0126)    DOPamine 5 mcg/kg/min (08/11/17 1500)    epinephrine infusion 0.04 mcg/kg/min (08/11/17 1500)    nitric oxide gas      norepinephrine bitartrate-D5W Stopped (08/09/17 1200)    propofol Stopped (08/11/17 0545)    TPN ADULT CENTRAL LINE CUSTOM 50 mL/hr at 08/11/17 1500    TPN ADULT CENTRAL LINE CUSTOM      vasopressin (PITRESSIN) infusion 0.02 Units/min (08/11/17 1500)     Scheduled Meds:   ceFEPime (MAXIPIME) IVPB  1 g Intravenous Q12H    pantoprazole  40 mg Intravenous BID    sodium chloride 0.9%  10 mL Intravenous Q6H     PRN Meds:sodium chloride, acetaminophen, albumin human 5%, albuterol sulfate, bisacodyl, dextrose 50%, fentaNYL, glucagon (human recombinant), hydrALAZINE, insulin aspart, ondansetron, Flushing PICC Protocol **AND** sodium chloride 0.9% **AND** sodium chloride 0.9%    Review of patient's allergies indicates:  No Known Allergies  Objective:     Vital Signs (Most Recent):  Temp: 99.1 °F (37.3 °C) (08/11/17 1500)  Pulse: 79 (08/11/17 1500)  Resp: 17 (08/11/17 1500)  BP: (!) 80/0 (08/11/17 1500)  SpO2: 100 % (08/11/17 1500) Vital Signs (24h Range):  Temp:  [98.6 °F (37 °C)-100.3 °F (37.9 °C)] 99.1 °F (37.3 °C)  Pulse:  [79-80] 79  Resp:  [13-26] 17  SpO2:  [96 %-100 %] 100 %  BP: (70-92)/(0-66) 80/0  Arterial Line BP: (62-95)/(50-89) 69/56     Weight: 88 kg (194 lb 0.1 oz)  Body mass index is 29.5 kg/m².      Intake/Output Summary (Last 24 hours) at 08/11/17  1511  Last data filed at 08/11/17 1500   Gross per 24 hour   Intake          4135.37 ml   Output             3140 ml   Net           995.37 ml       Hemodynamic Parameters:           Physical Exam   Constitutional: He is oriented to person, place, and time. He appears well-developed and well-nourished. No distress. He is sedated and intubated.   HENT:   Head: Normocephalic and atraumatic.   Eyes: EOM are normal. Pupils are equal, round, and reactive to light.   Neck: Normal range of motion. Neck supple. No JVD present.   Cardiovascular: Normal rate and regular rhythm.  Exam reveals no gallop and no friction rub.    No murmur heard.  + LVAD hum    Pulmonary/Chest: Effort normal and breath sounds normal. He is intubated. No respiratory distress. He has no wheezes. He has no rales.   Abdominal: Soft. He exhibits no distension. There is no tenderness. There is no guarding.   Musculoskeletal: Normal range of motion. He exhibits no edema.   Neurological: He is oriented to person, place, and time. No cranial nerve deficit. Coordination normal.   Skin: Skin is warm and dry. He is not diaphoretic. No erythema.   Nursing note and vitals reviewed.      Significant Labs:  CBC:    Recent Labs  Lab 08/11/17  1057   WBC 12.14   RBC 2.98*   HGB 8.0*   HCT 23.5*      MCV 79*   MCH 26.8*   MCHC 34.0     BNP:    Recent Labs  Lab 08/11/17  0507   BNP 2,609*     CMP:    Recent Labs  Lab 08/11/17  1057   *   CALCIUM 7.9*   ALBUMIN 2.5*   PROT 5.2*      K 3.5   CO2 25      BUN 81*   CREATININE 2.1*   ALKPHOS 90   ALT 46*   AST 48*   BILITOT 2.4*      Coagulation:     Recent Labs  Lab 08/11/17  0507   INR 5.8*   APTT 61.9*     LDH:    Recent Labs  Lab 08/09/17  0349 08/10/17  0501 08/11/17  0507   * 319* 207     Microbiology:  Microbiology Results (last 7 days)     Procedure Component Value Units Date/Time    Blood culture [[id number]] Collected:  08/11/17 1326    Order Status:  Sent Specimen:  Blood from  Peripheral, Forearm, Left Updated:  08/11/17 1327    Blood culture [[id number]] Collected:  08/11/17 1326    Order Status:  Sent Specimen:  Blood from Peripheral, Forearm, Left Updated:  08/11/17 1327    Blood culture [[id number]] Collected:  08/09/17 0813    Order Status:  Completed Specimen:  Blood from Line, Arterial, Right Updated:  08/11/17 1238     Blood Culture, Routine Gram stain clement bottle: Gram negative rods      Blood Culture, Routine Results called to and read back by: Emma York RN 08/10/2017  11:13     Blood Culture, Routine --     ESCHERICHIA COLI  Susceptibility pending      Culture, Respiratory with Gram Stain [[id number]]  (Susceptibility) Collected:  08/09/17 0723    Order Status:  Completed Specimen:  Respiratory from Endotracheal Aspirate Updated:  08/11/17 1153     Respiratory Culture --     ESCHERICHIA COLI ESBL  Many       Gram Stain (Respiratory) <10 epithelial cells per low power field.     Gram Stain (Respiratory) Few WBC's     Gram Stain (Respiratory) Many Gram negative rods     Gram Stain (Respiratory) Few Gram positive rods     Gram Stain (Respiratory) Few Gram positive cocci    Blood culture [[id number]] Collected:  08/09/17 0733    Order Status:  Completed Specimen:  Blood from Peripheral, Antecubital, Left Updated:  08/11/17 1022     Blood Culture, Routine No Growth to date     Blood Culture, Routine No Growth to date     Blood Culture, Routine No Growth to date    Urine culture [[id number]] Collected:  08/09/17 0725    Order Status:  Completed Specimen:  Urine from Urine, Catheterized Updated:  08/10/17 1128     Urine Culture, Routine No growth    Blood culture [[id number]] Collected:  08/04/17 1653    Order Status:  Completed Specimen:  Blood from Peripheral, Wrist, Right Updated:  08/09/17 2012     Blood Culture, Routine No growth after 5 days.    Blood culture [[id number]] Collected:  08/04/17 1654    Order Status:  Completed Specimen:  Blood from Peripheral, Wrist, Left Updated:   08/09/17 2012     Blood Culture, Routine No growth after 5 days.    Urine culture [[id number]] Collected:  08/04/17 1655    Order Status:  Completed Specimen:  Urine from Urine, Catheterized Updated:  08/05/17 2116     Urine Culture, Routine No growth          I have reviewed all pertinent labs within the past 24 hours.    Estimated Creatinine Clearance: 36.8 mL/min (based on Cr of 2.1).    Diagnostic Results:  I have reviewed and interpreted all pertinent imaging results/findings within the past 24 hours.    Assessment and Plan:     LVAD (left ventricular assist device) present    - LVAD  III. Placed this admit 7/27/17  - CTS Primary  - chest closure (7/28/17) and extubated (7/29/17)  - Speed 5200  - LDH stable  - INR supratherputic now, hold coumadin   -Reintubated 8/9/17. Pressors and extubation per CTS   -Echo did not show pericardial effusion                  Bacteremia    -Gram neg rods. Cont Cefepime, D/C Diflucan        Atrial fibrillation    -AC, Amio per C TS          Atrial tachycardia    - [id number] - 3  - c/w amiodarone  - c/w heparin gtt        AICD discharge    - appropriate in the setting of VT aggravated by underlying AT/AFL (earlier during the admission)  - 7/28 - setting of AF undersense - device reprogrammed to VVI 80  - tachy therapy turned off  - Was on amio gtt. Now on PO Amio  - EP planning to do lead revision         Hepatitis B core antibody positive since 2012    - will defer liver biopsy as CTS not requiring it  - ID consult cleared the patient for sx  - case discussed with hepatology, low suspicion for liver involvement        V-tach    - Amio        Hyperlipidemia    - Rec resuming pravastatin            Susannah Elizabeth PA-C  Heart Transplant  [facility]

## 2017-08-11 NOTE — PT/OT/SLP PROGRESS
Occupational Therapy      Suman Hayden  MRN: [id number]    Patient not seen today secondary to  (Pt approved for bed activity only this session, thus appropriate for one discipline; PT following). Will follow-up over the weekend.    DELMY Lucero  8/11/2017

## 2017-08-11 NOTE — SUBJECTIVE & OBJECTIVE
Interval History:   CVP of 9 this morning, vent settings stable.  Administered albumin yesterday afternoon with improvement in his UOP.  He remains on lasix gtt this morning.  sCR improved, down to 2.2 from 2.3.      Review of patient's allergies indicates:  No Known Allergies  Current Facility-Administered Medications   Medication Frequency    0.9%  NaCl infusion (for blood administration) Q24H PRN    acetaminophen tablet 650 mg Q6H PRN    albumin human 5% bottle 500 mL PRN    albuterol nebulizer solution 2.5 mg Q4H PRN    amiodarone 360 mg/200 mL (1.8 mg/mL) infusion Continuous    bisacodyl suppository 10 mg Daily PRN    cefepime in dextrose 5 % 1 gram/50 mL IVPB 1 g Q12H    dextrose 50% injection 12.5 g PRN    DOBUtamine 1000 mg in D5W 250 mL infusion (premix non-titrating) Continuous    DOPamine 400 mg in dextrose 5 % 250 mL infusion (premix) (NON-TITRATING) Continuous    EPINEPHrine (ADRENALIN) 4 mg in sodium chloride 0.9% 250 mL infusion Continuous    fentaNYL injection 50 mcg Q4H PRN    glucagon (human recombinant) injection 1 mg PRN    hydrALAZINE injection 10 mg Q6H PRN    insulin aspart pen 0-5 Units Q4H PRN    nitric oxide gas Gas 5 ppm Continuous    norepinephrine 4 mg in dextrose 5% 250 mL infusion (premix) (titrating) Continuous    ondansetron injection 4 mg Q6H PRN    pantoprazole injection 40 mg BID    propofol (DIPRIVAN) 10 mg/mL infusion Continuous    sodium chloride 0.9% flush 10 mL Q6H    And    sodium chloride 0.9% flush 10 mL PRN    TPN ADULT CENTRAL LINE CUSTOM Continuous    TPN ADULT CENTRAL LINE CUSTOM Continuous    vasopressin (PITRESSIN) 100 Units in dextrose 5 % 100 mL infusion Continuous       Objective:     Vital Signs (Most Recent):  Temp: 98.9 °F (37.2 °C) (08/11/17 1100)  Pulse: 80 (08/11/17 1310)  Resp: 13 (08/11/17 1310)  BP: (!) 78/0 (08/11/17 0900)  SpO2: 100 % (08/11/17 1310)  O2 Device (Oxygen Therapy): ventilator (08/11/17 1300) Vital Signs (24h  Range):  Temp:  [98.6 °F (37 °C)-100.3 °F (37.9 °C)] 98.9 °F (37.2 °C)  Pulse:  [79-80] 80  Resp:  [13-26] 13  SpO2:  [96 %-100 %] 100 %  BP: (62-92)/(0-66) 78/0  Arterial Line BP: (62-95)/(50-89) 80/62     Weight: 88 kg (194 lb 0.1 oz) (08/11/17 1300)  Body mass index is 29.5 kg/m².  Body surface area is 2.05 meters squared.    I/O last 3 completed shifts:  In: 5520.4 [I.V.:3893]  Out: 3745 [Urine:1895; Drains:1850]    Physical Exam   Constitutional: He appears well-developed and well-nourished. No distress. He is intubated.   Able to follow commands     HENT:   Head: Normocephalic and atraumatic.   Right Ear: External ear normal.   Left Ear: External ear normal.   Eyes: Conjunctivae and EOM are normal. Right eye exhibits no discharge. Left eye exhibits no discharge.   Neck: Normal range of motion. Neck supple.   Cardiovascular: Normal rate and regular rhythm.    LVAD hum   Pulmonary/Chest: Breath sounds normal. He is intubated. No respiratory distress. He has no wheezes. He has no rales.   Abdominal: Soft. Bowel sounds are normal. He exhibits no distension.   Musculoskeletal: He exhibits edema (trace).   Neurological: He is alert.   Skin: Skin is warm and dry. He is not diaphoretic.       Significant Labs:  ABGs:   Recent Labs  Lab 08/11/17  0803   PH 7.510*   PCO2 37.3   HCO3 29.8*   POCSATURATED 99   BE 7     CBC:   Recent Labs  Lab 08/11/17  1057   WBC 12.14   RBC 2.98*   HGB 8.0*   HCT 23.5*      MCV 79*   MCH 26.8*   MCHC 34.0     CMP:   Recent Labs  Lab 08/11/17  1057   *   CALCIUM 7.9*   ALBUMIN 2.5*   PROT 5.2*      K 3.5   CO2 25      BUN 81*   CREATININE 2.1*   ALKPHOS 90   ALT 46*   AST 48*   BILITOT 2.4*

## 2017-08-11 NOTE — PROGRESS NOTES
[facility]  Adult Nutrition  Consult Note    SUMMARY     Recommendations    1. Continue current custom TPN regimen of 240g D/102g AA @ 50 mL/hr. Add 250 mL 20% lipids to meet 100% pt's EEN.    - Check TRIG weekly while on Lipids; Electrolytes  to be customed by MD & pharmacy.   2. If able to extubate & advance diet, recommend cardiac diet, texture per SLP.   3. RD to monitor & follow-up.    Goals: Meet % EEN, EPN  Nutrition Goal Status: progressing towards goal  Communication of RD Recs: reviewed with RN     Reason for Assessment    Reason for Assessment: RD follow-up  Diagnosis:  (s/p LVAD)  Relevent Medical History: HTN, HLD   Interdisciplinary Rounds: attended     General Information Comments: S/p LVAD. Pt intubated, receiving custom TPN, no lipids ordered. Propofol discontinued.   Nutrition Discharge Planning: Unable to determine    Nutrition Prescription Ordered    Current Diet Order: NPO     Current Nutrition Support Formula Ordered: Other (Comment) (Custom TPN: 240g D/102g AA)  Current Nutrition Support Rate Ordered: 50 (ml)  Current Nutrition Support Frequency Ordered: mL/hr     No lipids ordered     Evaluation of Received Nutrients/Fluid Intake    Parenteral Calories (kcal): 1224  Parenteral Protein (gm): 102  Parenteral Fluid (mL): 1200     Energy Calories Required: not meeting needs  % Kcal Needs:  (66%)     Protein Required: meeting needs  % Protein Needs:  (100%)     IV Fluid (mL): 120     GIR (Glucose Infusion Rate) (mg/kg/min): 1.89 mg/kg/min     Fluid Required: meeting needs    Nutrition/Diet History    Patient Reported Diet/Restrictions/Preferences: general, low salt     Factors Affecting Nutritional Intake: NPO, on mechanical ventilation    Labs/Tests/Procedures/Meds    Pertinent Labs Reviewed: reviewed, pertinent  Pertinent Labs Comments: BUN 79, Creat 2.2, GFR 34.6, Gluc 154  Pertinent Medications Reviewed: reviewed, pertinent  Pertinent Medications Comments: Custom  "TPN, Lasix, Protonix, IVF    Physical Findings    Overall Physical Appearance: nourished  Tubes: orogastric tube  Oral/Mouth Cavity: tooth/teeth missing  Skin: edema, incision    Anthropometrics    Height: 5' 8" (172.7 cm)  Weight Method: Bed Scale  Weight: 88 kg (194 lb 0.1 oz)    Ideal Body Weight (IBW), Male: 154 lb  % Ideal Body Weight, Male (lb): 125.98 lb     BMI (Calculated): 29.6  BMI Grade: 25 - 29.9 - overweight    Estimated/Assessed Needs    Weight Used For Calorie Calculations: 88 kg (194 lb 0.1 oz)      Energy Calorie Requirements (kcal): 1865 kcal/d  Energy Need Method: [facility]     Weight Used For Protein Calculations: 88 kg (194 lb 0.1 oz)  Protein Requirements: 106-142 g/d     Fluid Need Method: RDA Method, other (see comments) (Per MD or 1 mL/kcal)    Assessment and Plan    Increased nutrient needs related to physiological causes as evidenced by s/p LVAD placement.  Status: Continues     Monitor and Evaluation    Food and Nutrient Intake: energy intake, food and beverage intake, parenteral nutrition intake  Food and Nutrient Adminstration: diet order, enteral and parenteral nutrition administration     Physical Activity and Function: nutrition-related ADLs and IADLs  Anthropometric Measurements: weight, weight change, body mass index  Biochemical Data, Medical Tests and Procedures: gastrointestinal profile, electrolyte and renal panel, glucose/endocrine profile, lipid profile, inflammatory profile  Nutrition-Focused Physical Findings: overall appearance    Nutrition Risk    Level of Risk: other (see comments) (2x/week)    Nutrition Follow-Up    RD Follow-up?: Yes      "

## 2017-08-12 PROBLEM — K56.7 ILEUS: Status: ACTIVE | Noted: 2017-08-12

## 2017-08-12 LAB
ALBUMIN SERPL BCP-MCNC: 2 G/DL
ALBUMIN SERPL BCP-MCNC: 2.1 G/DL
ALBUMIN SERPL BCP-MCNC: 2.4 G/DL
ALLENS TEST: ABNORMAL
ALLENS TEST: ABNORMAL
ALP SERPL-CCNC: 112 U/L
ALP SERPL-CCNC: 123 U/L
ALP SERPL-CCNC: 128 U/L
ALT SERPL W/O P-5'-P-CCNC: 37 U/L
ALT SERPL W/O P-5'-P-CCNC: 38 U/L
ALT SERPL W/O P-5'-P-CCNC: 40 U/L
ANION GAP SERPL CALC-SCNC: 11 MMOL/L
ANION GAP SERPL CALC-SCNC: 12 MMOL/L
ANION GAP SERPL CALC-SCNC: 13 MMOL/L
ANION GAP SERPL CALC-SCNC: 13 MMOL/L
ANISOCYTOSIS BLD QL SMEAR: ABNORMAL
ANISOCYTOSIS BLD QL SMEAR: ABNORMAL
ANISOCYTOSIS BLD QL SMEAR: SLIGHT
APTT BLDCRRT: 58.1 SEC
AST SERPL-CCNC: 34 U/L
AST SERPL-CCNC: 34 U/L
AST SERPL-CCNC: 35 U/L
BACTERIA BLD CULT: NORMAL
BASO STIPL BLD QL SMEAR: ABNORMAL
BASO STIPL BLD QL SMEAR: ABNORMAL
BASOPHILS # BLD AUTO: 0.01 K/UL
BASOPHILS # BLD AUTO: ABNORMAL K/UL
BASOPHILS NFR BLD: 0 %
BASOPHILS NFR BLD: 0.1 %
BILIRUB SERPL-MCNC: 2.5 MG/DL
BILIRUB SERPL-MCNC: 2.5 MG/DL
BILIRUB SERPL-MCNC: 4.2 MG/DL
BLD PROD TYP BPU: NORMAL
BLOOD UNIT EXPIRATION DATE: NORMAL
BLOOD UNIT TYPE CODE: 5100
BLOOD UNIT TYPE: NORMAL
BUN SERPL-MCNC: 103 MG/DL
BUN SERPL-MCNC: 91 MG/DL
BUN SERPL-MCNC: 94 MG/DL
BUN SERPL-MCNC: 99 MG/DL
BURR CELLS BLD QL SMEAR: ABNORMAL
BURR CELLS BLD QL SMEAR: ABNORMAL
CALCIUM SERPL-MCNC: 7.9 MG/DL
CALCIUM SERPL-MCNC: 8.1 MG/DL
CALCIUM SERPL-MCNC: 8.1 MG/DL
CALCIUM SERPL-MCNC: 8.3 MG/DL
CHLORIDE SERPL-SCNC: 104 MMOL/L
CHLORIDE SERPL-SCNC: 105 MMOL/L
CHLORIDE SERPL-SCNC: 106 MMOL/L
CHLORIDE SERPL-SCNC: 106 MMOL/L
CO2 SERPL-SCNC: 23 MMOL/L
CO2 SERPL-SCNC: 25 MMOL/L
CODING SYSTEM: NORMAL
CREAT SERPL-MCNC: 2 MG/DL
CREAT SERPL-MCNC: 2.1 MG/DL
DELSYS: ABNORMAL
DELSYS: ABNORMAL
DIFFERENTIAL METHOD: ABNORMAL
DISPENSE STATUS: NORMAL
DOHLE BOD BLD QL SMEAR: PRESENT
DOHLE BOD BLD QL SMEAR: PRESENT
EOSINOPHIL # BLD AUTO: 0.1 K/UL
EOSINOPHIL # BLD AUTO: 0.2 K/UL
EOSINOPHIL # BLD AUTO: 0.3 K/UL
EOSINOPHIL # BLD AUTO: ABNORMAL K/UL
EOSINOPHIL NFR BLD: 1.4 %
EOSINOPHIL NFR BLD: 2.3 %
EOSINOPHIL NFR BLD: 3 %
EOSINOPHIL NFR BLD: 4 %
ERYTHROCYTE [DISTWIDTH] IN BLOOD BY AUTOMATED COUNT: 15.5 %
ERYTHROCYTE [DISTWIDTH] IN BLOOD BY AUTOMATED COUNT: 15.6 %
ERYTHROCYTE [DISTWIDTH] IN BLOOD BY AUTOMATED COUNT: 15.8 %
ERYTHROCYTE [DISTWIDTH] IN BLOOD BY AUTOMATED COUNT: 15.9 %
ERYTHROCYTE [SEDIMENTATION RATE] IN BLOOD BY WESTERGREN METHOD: 14 MM/H
EST. GFR  (AFRICAN AMERICAN): 36.6 ML/MIN/1.73 M^2
EST. GFR  (AFRICAN AMERICAN): 38.8 ML/MIN/1.73 M^2
EST. GFR  (NON AFRICAN AMERICAN): 31.6 ML/MIN/1.73 M^2
EST. GFR  (NON AFRICAN AMERICAN): 33.5 ML/MIN/1.73 M^2
FIO2: 50
FIO2: 50
GIANT PLATELETS BLD QL SMEAR: PRESENT
GIANT PLATELETS BLD QL SMEAR: PRESENT
GLUCOSE SERPL-MCNC: 152 MG/DL
GLUCOSE SERPL-MCNC: 153 MG/DL
GLUCOSE SERPL-MCNC: 160 MG/DL
GLUCOSE SERPL-MCNC: 161 MG/DL
HCO3 UR-SCNC: 22.4 MMOL/L (ref 24–28)
HCO3 UR-SCNC: 24.3 MMOL/L (ref 24–28)
HCT VFR BLD AUTO: 21.3 %
HCT VFR BLD AUTO: 21.4 %
HCT VFR BLD AUTO: 22.5 %
HCT VFR BLD AUTO: 23.1 %
HGB BLD-MCNC: 7.4 G/DL
HGB BLD-MCNC: 7.5 G/DL
HGB BLD-MCNC: 7.9 G/DL
HGB BLD-MCNC: 8.1 G/DL
HYPOCHROMIA BLD QL SMEAR: ABNORMAL
INR PPP: 5.4
LDH SERPL L TO P-CCNC: 409 U/L
LYMPHOCYTES # BLD AUTO: 0.8 K/UL
LYMPHOCYTES # BLD AUTO: 0.9 K/UL
LYMPHOCYTES # BLD AUTO: 1 K/UL
LYMPHOCYTES # BLD AUTO: ABNORMAL K/UL
LYMPHOCYTES NFR BLD: 10.1 %
LYMPHOCYTES NFR BLD: 14 %
LYMPHOCYTES NFR BLD: 9.1 %
LYMPHOCYTES NFR BLD: 9.9 %
MAGNESIUM SERPL-MCNC: 2.2 MG/DL
MCH RBC QN AUTO: 26.9 PG
MCH RBC QN AUTO: 27.1 PG
MCH RBC QN AUTO: 27.1 PG
MCH RBC QN AUTO: 27.2 PG
MCHC RBC AUTO-ENTMCNC: 34.7 G/DL
MCHC RBC AUTO-ENTMCNC: 35 G/DL
MCHC RBC AUTO-ENTMCNC: 35.1 G/DL
MCHC RBC AUTO-ENTMCNC: 35.1 G/DL
MCV RBC AUTO: 77 FL
MCV RBC AUTO: 78 FL
MIN VOL: 10
MODE: ABNORMAL
MODE: ABNORMAL
MONOCYTES # BLD AUTO: 0.6 K/UL
MONOCYTES # BLD AUTO: 0.9 K/UL
MONOCYTES # BLD AUTO: 0.9 K/UL
MONOCYTES # BLD AUTO: ABNORMAL K/UL
MONOCYTES NFR BLD: 10.8 %
MONOCYTES NFR BLD: 4 %
MONOCYTES NFR BLD: 6.4 %
MONOCYTES NFR BLD: 8.3 %
NEUTROPHILS # BLD AUTO: 6.6 K/UL
NEUTROPHILS # BLD AUTO: 7.5 K/UL
NEUTROPHILS # BLD AUTO: 8 K/UL
NEUTROPHILS NFR BLD: 76 %
NEUTROPHILS NFR BLD: 76.7 %
NEUTROPHILS NFR BLD: 78.7 %
NEUTROPHILS NFR BLD: 83 %
NEUTS BAND NFR BLD MANUAL: 2 %
OVALOCYTES BLD QL SMEAR: ABNORMAL
PCO2 BLDA: 35.8 MMHG (ref 35–45)
PCO2 BLDA: 43 MMHG (ref 35–45)
PEEP: 5
PEEP: 5
PH SMN: 7.36 [PH] (ref 7.35–7.45)
PH SMN: 7.41 [PH] (ref 7.35–7.45)
PHOSPHATE SERPL-MCNC: 2.6 MG/DL
PLATELET # BLD AUTO: 123 K/UL
PLATELET # BLD AUTO: 127 K/UL
PLATELET # BLD AUTO: 140 K/UL
PLATELET # BLD AUTO: 154 K/UL
PLATELET BLD QL SMEAR: ABNORMAL
PMV BLD AUTO: 11.1 FL
PMV BLD AUTO: 11.8 FL
PMV BLD AUTO: 12 FL
PMV BLD AUTO: 12.1 FL
PO2 BLDA: 156 MMHG (ref 80–100)
PO2 BLDA: 207 MMHG (ref 80–100)
POC BE: -1 MMOL/L
POC BE: -2 MMOL/L
POC SATURATED O2: 100 % (ref 95–100)
POC SATURATED O2: 99 % (ref 95–100)
POC TCO2: 24 MMOL/L (ref 23–27)
POC TCO2: 26 MMOL/L (ref 23–27)
POIKILOCYTOSIS BLD QL SMEAR: SLIGHT
POLYCHROMASIA BLD QL SMEAR: ABNORMAL
POTASSIUM SERPL-SCNC: 3.7 MMOL/L
POTASSIUM SERPL-SCNC: 3.8 MMOL/L
PROT SERPL-MCNC: 5 G/DL
PROT SERPL-MCNC: 5 G/DL
PROT SERPL-MCNC: 5.3 G/DL
PROTHROMBIN TIME: 56.2 SEC
PS: 10
PS: 10
RBC # BLD AUTO: 2.72 M/UL
RBC # BLD AUTO: 2.77 M/UL
RBC # BLD AUTO: 2.91 M/UL
RBC # BLD AUTO: 3.01 M/UL
SAMPLE: ABNORMAL
SAMPLE: ABNORMAL
SCHISTOCYTES BLD QL SMEAR: PRESENT
SCHISTOCYTES BLD QL SMEAR: PRESENT
SITE: ABNORMAL
SITE: ABNORMAL
SODIUM SERPL-SCNC: 140 MMOL/L
SODIUM SERPL-SCNC: 141 MMOL/L
SODIUM SERPL-SCNC: 141 MMOL/L
SODIUM SERPL-SCNC: 142 MMOL/L
SP02: 100
SP02: 100
SPONT RATE: 18
TARGETS BLD QL SMEAR: ABNORMAL
TOXIC GRANULES BLD QL SMEAR: PRESENT
TOXIC GRANULES BLD QL SMEAR: PRESENT
TRANS ERYTHROCYTES VOL PATIENT: NORMAL ML
VT: 500
WBC # BLD AUTO: 10.35 K/UL
WBC # BLD AUTO: 8.67 K/UL
WBC # BLD AUTO: 8.67 K/UL
WBC # BLD AUTO: 9.26 K/UL

## 2017-08-12 PROCEDURE — 99233 SBSQ HOSP IP/OBS HIGH 50: CPT | Mod: GC,,, | Performed by: SURGERY

## 2017-08-12 PROCEDURE — 25500020 PHARM REV CODE 255: Performed by: STUDENT IN AN ORGANIZED HEALTH CARE EDUCATION/TRAINING PROGRAM

## 2017-08-12 PROCEDURE — 27200966 HC CLOSED SUCTION SYSTEM

## 2017-08-12 PROCEDURE — 99232 SBSQ HOSP IP/OBS MODERATE 35: CPT | Mod: ,,, | Performed by: INTERNAL MEDICINE

## 2017-08-12 PROCEDURE — 85007 BL SMEAR W/DIFF WBC COUNT: CPT

## 2017-08-12 PROCEDURE — 94761 N-INVAS EAR/PLS OXIMETRY MLT: CPT

## 2017-08-12 PROCEDURE — 84100 ASSAY OF PHOSPHORUS: CPT

## 2017-08-12 PROCEDURE — 25000003 PHARM REV CODE 250: Performed by: STUDENT IN AN ORGANIZED HEALTH CARE EDUCATION/TRAINING PROGRAM

## 2017-08-12 PROCEDURE — 94003 VENT MGMT INPAT SUBQ DAY: CPT

## 2017-08-12 PROCEDURE — 82803 BLOOD GASES ANY COMBINATION: CPT

## 2017-08-12 PROCEDURE — P9021 RED BLOOD CELLS UNIT: HCPCS

## 2017-08-12 PROCEDURE — 63600175 PHARM REV CODE 636 W HCPCS: Performed by: STUDENT IN AN ORGANIZED HEALTH CARE EDUCATION/TRAINING PROGRAM

## 2017-08-12 PROCEDURE — 80053 COMPREHEN METABOLIC PANEL: CPT

## 2017-08-12 PROCEDURE — 83735 ASSAY OF MAGNESIUM: CPT

## 2017-08-12 PROCEDURE — 83615 LACTATE (LD) (LDH) ENZYME: CPT

## 2017-08-12 PROCEDURE — 99222 1ST HOSP IP/OBS MODERATE 55: CPT | Mod: GC,,, | Performed by: INTERNAL MEDICINE

## 2017-08-12 PROCEDURE — 99900026 HC AIRWAY MAINTENANCE (STAT)

## 2017-08-12 PROCEDURE — 27000248 HC VAD-ADDITIONAL DAY

## 2017-08-12 PROCEDURE — 85610 PROTHROMBIN TIME: CPT

## 2017-08-12 PROCEDURE — 85730 THROMBOPLASTIN TIME PARTIAL: CPT

## 2017-08-12 PROCEDURE — 80053 COMPREHEN METABOLIC PANEL: CPT | Mod: 91

## 2017-08-12 PROCEDURE — 99900035 HC TECH TIME PER 15 MIN (STAT)

## 2017-08-12 PROCEDURE — 27000221 HC OXYGEN, UP TO 24 HOURS

## 2017-08-12 PROCEDURE — 37799 UNLISTED PX VASCULAR SURGERY: CPT

## 2017-08-12 PROCEDURE — 99233 SBSQ HOSP IP/OBS HIGH 50: CPT | Mod: ,,, | Performed by: INTERNAL MEDICINE

## 2017-08-12 PROCEDURE — 97110 THERAPEUTIC EXERCISES: CPT

## 2017-08-12 PROCEDURE — 85027 COMPLETE CBC AUTOMATED: CPT

## 2017-08-12 PROCEDURE — 85025 COMPLETE CBC W/AUTO DIFF WBC: CPT

## 2017-08-12 PROCEDURE — A4216 STERILE WATER/SALINE, 10 ML: HCPCS | Performed by: THORACIC SURGERY (CARDIOTHORACIC VASCULAR SURGERY)

## 2017-08-12 PROCEDURE — C9113 INJ PANTOPRAZOLE SODIUM, VIA: HCPCS | Performed by: STUDENT IN AN ORGANIZED HEALTH CARE EDUCATION/TRAINING PROGRAM

## 2017-08-12 PROCEDURE — 80048 BASIC METABOLIC PNL TOTAL CA: CPT

## 2017-08-12 PROCEDURE — 25000003 PHARM REV CODE 250: Performed by: THORACIC SURGERY (CARDIOTHORACIC VASCULAR SURGERY)

## 2017-08-12 PROCEDURE — 20000000 HC ICU ROOM

## 2017-08-12 RX ORDER — HYDROCODONE BITARTRATE AND ACETAMINOPHEN 500; 5 MG/1; MG/1
TABLET ORAL
Status: DISCONTINUED | OUTPATIENT
Start: 2017-08-12 | End: 2017-08-14

## 2017-08-12 RX ADMIN — Medication 10 ML: at 06:08

## 2017-08-12 RX ADMIN — PANTOPRAZOLE SODIUM 40 MG: 40 INJECTION, POWDER, FOR SOLUTION INTRAVENOUS at 09:08

## 2017-08-12 RX ADMIN — DOPAMINE HYDROCHLORIDE IN DEXTROSE 5 MCG/KG/MIN: 1.6 INJECTION, SOLUTION INTRAVENOUS at 05:08

## 2017-08-12 RX ADMIN — EPINEPHRINE 0.04 MCG/KG/MIN: 1 INJECTION PARENTERAL at 05:08

## 2017-08-12 RX ADMIN — CEFEPIME HYDROCHLORIDE 1 G: 1 INJECTION, SOLUTION INTRAVENOUS at 09:08

## 2017-08-12 RX ADMIN — PROPOFOL 10 MCG/KG/MIN: 10 INJECTION, EMULSION INTRAVENOUS at 11:08

## 2017-08-12 RX ADMIN — IOHEXOL 15 ML: 350 INJECTION, SOLUTION INTRAVENOUS at 05:08

## 2017-08-12 RX ADMIN — FENTANYL CITRATE 50 MCG: 50 INJECTION INTRAMUSCULAR; INTRAVENOUS at 01:08

## 2017-08-12 RX ADMIN — FENTANYL CITRATE 50 MCG: 50 INJECTION INTRAMUSCULAR; INTRAVENOUS at 03:08

## 2017-08-12 RX ADMIN — Medication 10 ML: at 11:08

## 2017-08-12 RX ADMIN — PROPOFOL 10 MCG/KG/MIN: 10 INJECTION, EMULSION INTRAVENOUS at 03:08

## 2017-08-12 RX ADMIN — CALCIUM GLUCONATE: 94 INJECTION, SOLUTION INTRAVENOUS at 10:08

## 2017-08-12 RX ADMIN — ERTAPENEM SODIUM 1 G: 1 INJECTION, POWDER, LYOPHILIZED, FOR SOLUTION INTRAMUSCULAR; INTRAVENOUS at 07:08

## 2017-08-12 NOTE — ASSESSMENT & PLAN NOTE
68 yo M PMhx non-ischemic CM on home dobutamine, HTN, HLD, s/p AICD who initially presented after syncopal episode and ICD shocks. ID was consulted a few times during patient's complicated clinical course. Patient found to have ESBL E Coli bacteremia as well as ESBL E Coli from resp cx. Unsure if positive blood culture truly drawn from A-line, however, would recommend removing whichever lines are possible in addition to d/c cefepime and starting ertapenem. As patient has ESBL E Coli would place on contact precautions.    - d/c cefepime  - start ertapenem  - contact precautions

## 2017-08-12 NOTE — SUBJECTIVE & OBJECTIVE
Interval History: good urine output, 30-50ccs per hour per report this morning, 1990 past 24hrs, more or less matching Is & Os    Review of patient's allergies indicates:  No Known Allergies  Current Facility-Administered Medications   Medication Frequency    0.9%  NaCl infusion (for blood administration) Q24H PRN    0.9%  NaCl infusion (for blood administration) Q24H PRN    acetaminophen tablet 650 mg Q6H PRN    albumin human 5% bottle 500 mL PRN    albuterol nebulizer solution 2.5 mg Q4H PRN    bisacodyl suppository 10 mg Daily PRN    cefepime in dextrose 5 % 1 gram/50 mL IVPB 1 g Q12H    dextrose 50% injection 12.5 g PRN    DOPamine 400 mg in dextrose 5 % 250 mL infusion (premix) (NON-TITRATING) Continuous    EPINEPHrine (ADRENALIN) 4 mg in sodium chloride 0.9% 250 mL infusion Continuous    fentaNYL injection 50 mcg Q4H PRN    glucagon (human recombinant) injection 1 mg PRN    hydrALAZINE injection 10 mg Q6H PRN    insulin aspart pen 0-5 Units Q4H PRN    ondansetron injection 4 mg Q6H PRN    pantoprazole injection 40 mg BID    propofol (DIPRIVAN) 10 mg/mL infusion Continuous    sodium chloride 0.9% flush 10 mL Q6H    And    sodium chloride 0.9% flush 10 mL PRN    TPN ADULT CENTRAL LINE CUSTOM Continuous    vasopressin (PITRESSIN) 100 Units in dextrose 5 % 100 mL infusion Continuous       Objective:     Vital Signs (Most Recent):  Temp: 99 °F (37.2 °C) (08/12/17 1255)  Pulse: 79 (08/12/17 1300)  Resp: 17 (08/12/17 1300)  BP: (!) 70/0 (08/12/17 1200)  SpO2: 100 % (08/12/17 1300)  O2 Device (Oxygen Therapy): ventilator (08/12/17 1300) Vital Signs (24h Range):  Temp:  [97.8 °F (36.6 °C)-99.1 °F (37.3 °C)] 99 °F (37.2 °C)  Pulse:  [] 79  Resp:  [11-20] 17  SpO2:  [97 %-100 %] 100 %  BP: (64-80)/(0) 70/0  Arterial Line BP: (55-83)/(49-68) 72/60     Weight: 85.5 kg (188 lb 7.9 oz) (08/12/17 0302)  Body mass index is 28.66 kg/m².  Body surface area is 2.03 meters squared.    I/O last 3  completed shifts:  In: 3983.6 [I.V.:2159.1]  Out: 3260 [Urine:2910; Drains:350]    Physical Exam   Constitutional: He is oriented to person, place, and time. He appears well-developed.   HENT:   Head: Normocephalic and atraumatic.   Eyes: EOM are normal.   Neck: No JVD present.   Cardiovascular: Normal rate and regular rhythm.  Exam reveals no friction rub.    Pulmonary/Chest: He has rales.   Abdominal: Soft. He exhibits no distension.   Musculoskeletal: He exhibits edema.   Neurological: He is alert and oriented to person, place, and time.   Skin: Skin is warm.   Psychiatric: He has a normal mood and affect.       Significant Labs:  CBC:   Recent Labs  Lab 08/12/17  0300 08/12/17  1119   WBC 8.67  --    RBC 2.77* 2.72*   HGB 7.5* 7.4*   HCT 21.4* 21.3*   * 127*   MCV 77* 78*   MCH 27.1 27.2   MCHC 35.0 34.7     CMP:   Recent Labs  Lab 08/12/17  1119   *   CALCIUM 8.1*   ALBUMIN 2.1*   PROT 5.0*      K 3.7   CO2 23      BUN 99*   CREATININE 2.1*   ALKPHOS 123   ALT 38   AST 34   BILITOT 2.5*     All labs within the past 24 hours have been reviewed.

## 2017-08-12 NOTE — CONSULTS
[facility]  Infectious Disease  Consult Note    Patient Name: Suman Hayden  MRN: [id number]  Admission Date: 7/18/2017  Hospital Length of Stay: 25 days  Attending Physician: Sunny Downing MD  Primary Care Provider: Joe Ernst MD     Isolation Status: Contact    Patient information was obtained from patient, past medical records and ER records.      Inpatient consult to Infectious Diseases  Consult performed by: CLIFFORD COTTER  Consult ordered by: HAYDE VARGAS        Assessment/Plan:     Bacteremia    68 yo M PMhx non-ischemic CM on home dobutamine, HTN, HLD, s/p AICD who initially presented after syncopal episode and ICD shocks. ID was consulted a few times during patient's complicated clinical course. Patient found to have ESBL E Coli bacteremia as well as ESBL E Coli from resp cx. Unsure if positive blood culture truly drawn from A-line, however, would recommend removing whichever lines are possible in addition to d/c cefepime and starting ertapenem. As patient has ESBL E Coli would place on contact precautions.    - d/c cefepime  - start ertapenem  - contact precautions            Thank you for your consult. I will follow-up with patient. Please contact us if you have any additional questions.    Clifford Cotter MD  Infectious Disease  [facility]    Subjective:     Principal Problem: Acute on chronic combined systolic and diastolic heart failure    HPI: This is a 67 year old male with NICM on home dobutamine, HTN, HLD, s/p medtronic who presented after ICD shocks x 2. He is now being worked up for advanced options. He currently has IABP. He is being evaluated for LVAD.  We saw patient last weekend for LVAD clearance and he was cleared at that time with vaccines updated.  We are now re-consulted for positive urine culture with enterococcus. His UA has greater than 100 WBCs. Patient denies dysuria, urinary frequency, abd pain, or flank pain. He does not  have a delgado catheter in place. He is afebrile and without leukocytosis. He is scheduled for LVAD placement tomorrow and we are re-consulted for clearance.  ID was initially consulted for clearance of LVAD and again for asymptomatic bacteruria w/ enterococcus prior to LVAD placement. ID is reconsulted s/p LVAD placement for antibiotic recommendations regarding positive blood cx and resp cx for ESBL E Coli.    Past Medical History:   Diagnosis Date    Cardiomyopathy     Hyperlipidemia     Hypertension     Obesity     S/P implantation of automatic cardioverter/defibrillator (AICD)     Ventricular tachycardia        Past Surgical History:   Procedure Laterality Date    CARDIAC CATHETERIZATION      CARDIAC DEFIBRILLATOR PLACEMENT         Review of patient's allergies indicates:  No Known Allergies    Medications:  Prescriptions Prior to Admission   Medication Sig    amiodarone (PACERONE) 200 MG Tab Take by mouth once daily.    aspirin 81 MG Chew Take 81 mg by mouth once daily.    DOBUTamine (DOBUTREX) 1,000 mg/250 mL (4,000 mcg/mL) infusion Inject 414 mcg/min into the vein continuous.    furosemide (LASIX) 40 MG tablet Take 1 tablet (40 mg total) by mouth 2 (two) times daily.    potassium chloride SA (K-DUR,KLOR-CON) 20 MEQ tablet Take 20 mEq by mouth once daily.     pravastatin (PRAVACHOL) 20 MG tablet Take 20 mg by mouth every evening.    spironolactone (ALDACTONE) 25 MG tablet Take 1 tablet (25 mg total) by mouth once daily.     Antibiotics     Start     Stop Route Frequency Ordered    08/09/17 0800  cefepime in dextrose 5 % 1 gram/50 mL IVPB 1 g      -- IV Every 12 hours (non-standard times) 08/09/17 0645    07/27/17 2100  mupirocin 2 % ointment      08/01 2059 Nasl 2 times daily 07/27/17 1346        Antifungals     None        Antivirals     None           Immunization History   Administered Date(s) Administered    Tdap 07/19/2017       Family History     None        Social History     Social  History    Marital status:      Spouse name: N/A    Number of children: N/A    Years of education: N/A     Social History Main Topics    Smoking status: Never Smoker    Smokeless tobacco: Never Used    Alcohol use No    Drug use: Unknown    Sexual activity: Not Asked     Other Topics Concern    None     Social History Narrative    None     Review of Systems   Unable to perform ROS: Intubated     Objective:     Vital Signs (Most Recent):  Temp: 99 °F (37.2 °C) (08/12/17 1255)  Pulse: 79 (08/12/17 1400)  Resp: 15 (08/12/17 1400)  BP: (!) 70/0 (08/12/17 1200)  SpO2: 100 % (08/12/17 1400) Vital Signs (24h Range):  Temp:  [97.8 °F (36.6 °C)-99.1 °F (37.3 °C)] 99 °F (37.2 °C)  Pulse:  [] 79  Resp:  [11-20] 15  SpO2:  [97 %-100 %] 100 %  BP: (64-80)/(0) 70/0  Arterial Line BP: (55-83)/(49-68) 78/63     Weight: 85.5 kg (188 lb 7.9 oz)  Body mass index is 28.66 kg/m².    Estimated Creatinine Clearance: 36.3 mL/min (based on Cr of 2.1).    Physical Exam   Constitutional: He appears well-developed and well-nourished.   HENT:   Head: Normocephalic and atraumatic.   Eyes: Conjunctivae are normal. No scleral icterus.   Cardiovascular: Intact distal pulses.    No murmur heard.  Pulmonary/Chest: He has no wheezes. He has rales.   ET tube in place   Abdominal: He exhibits distension. There is no tenderness. There is no rebound and no guarding.   Musculoskeletal: He exhibits edema.   L upper ext edema > R   Lymphadenopathy:     He has no cervical adenopathy.   Neurological: He is alert.   Skin: Skin is warm.       Significant Labs:   Blood Culture:   Recent Labs  Lab 08/04/17  1653 08/04/17  1654 08/09/17  0733 08/09/17  0813 08/11/17  1326   LABBLOO No growth after 5 days. No growth after 5 days. No Growth to date  No Growth to date  No Growth to date  No Growth to date Gram stain clement bottle: Gram negative rods   Results called to and read back by: Emma York RN 08/10/2017  11:13  ESCHERICHIA COLI  ESBL No Growth to date     BMP:   Recent Labs  Lab 08/12/17  0300 08/12/17  1119   * 161*    141   K 3.7 3.7    105   CO2 23 23   BUN 94* 99*   CREATININE 2.0* 2.1*   CALCIUM 8.3* 8.1*   MG 2.2  --      CBC:   Recent Labs  Lab 08/11/17  1718 08/11/17  2346 08/12/17  0300 08/12/17  1119   WBC 10.56 9.26 8.67  --    HGB 8.3* 7.9* 7.5* 7.4*   HCT 23.6* 22.5* 21.4* 21.3*    154 140* 127*     Respiratory Culture:   Recent Labs  Lab 08/09/17  0723   GSRESP <10 epithelial cells per low power field.  Few WBC's  Many Gram negative rods  Few Gram positive rods  Few Gram positive cocci   RESPIRATORYC ESCHERICHIA COLI ESBLMany     Urine Culture:   Recent Labs  Lab 07/24/17  1139 08/04/17  1655 08/09/17  0725   LABURIN ENTEROCOCCUS FAECALIS>100,000 cfu/mlNo other significant isolate No growth No growth     Urine Studies:   Recent Labs  Lab 08/10/17  1602   COLORU [place]   APPEARANCEUA Cloudy*   PHUR 5.0   SPECGRAV 1.015   PROTEINUA 1+*   GLUCUA Negative   KETONESU Negative   BILIRUBINUA Negative   OCCULTUA 2+*   NITRITE Negative   UROBILINOGEN Negative   LEUKOCYTESUR 2+*   RBCUA 20*   WBCUA 8*   BACTERIA None   SQUAMEPITHEL 1   HYALINECASTS 0       Significant Imaging: CXR: I have reviewed all pertinent results/findings within the past 24 hours:  no consolidation or pneumothorax Abd x-ray- No obstruction. Unchanged mild diffuse gaseous distention of small and large bowel. No new abnormality.

## 2017-08-12 NOTE — SUBJECTIVE & OBJECTIVE
Interval History: Intubated. Sedated but arousable and following commands    Continuous Infusions:   DOPamine 5 mcg/kg/min (08/12/17 1100)    epinephrine infusion 0.04 mcg/kg/min (08/12/17 1100)    propofol 10 mcg/kg/min (08/12/17 1100)    TPN ADULT CENTRAL LINE CUSTOM 50 mL/hr at 08/12/17 1100    vasopressin (PITRESSIN) infusion 0.04 Units/min (08/12/17 1100)     Scheduled Meds:   ceFEPime (MAXIPIME) IVPB  1 g Intravenous Q12H    pantoprazole  40 mg Intravenous BID    sodium chloride 0.9%  10 mL Intravenous Q6H     PRN Meds:sodium chloride, acetaminophen, albumin human 5%, albuterol sulfate, bisacodyl, dextrose 50%, fentaNYL, glucagon (human recombinant), hydrALAZINE, insulin aspart, ondansetron, Flushing PICC Protocol **AND** sodium chloride 0.9% **AND** sodium chloride 0.9%    Review of patient's allergies indicates:  No Known Allergies  Objective:     Vital Signs (Most Recent):  Temp: 99.1 °F (37.3 °C) (08/12/17 1100)  Pulse: 79 (08/12/17 1137)  Resp: 17 (08/12/17 1137)  BP: (!) 78/0 (08/11/17 2300)  SpO2: 100 % (08/12/17 1137) Vital Signs (24h Range):  Temp:  [97.8 °F (36.6 °C)-99.1 °F (37.3 °C)] 99.1 °F (37.3 °C)  Pulse:  [] 79  Resp:  [11-20] 17  SpO2:  [97 %-100 %] 100 %  BP: (64-80)/(0) 78/0  Arterial Line BP: (55-83)/(49-68) 76/61     Weight: 85.5 kg (188 lb 7.9 oz)  Body mass index is 28.66 kg/m².      Intake/Output Summary (Last 24 hours) at 08/12/17 1156  Last data filed at 08/12/17 1100   Gross per 24 hour   Intake          2386.63 ml   Output             1735 ml   Net           651.63 ml       Hemodynamic Parameters:           Physical Exam   Constitutional: He is oriented to person, place, and time. He appears well-developed and well-nourished. No distress. He is sedated and intubated.   HENT:   Head: Normocephalic and atraumatic.   Eyes: EOM are normal. Pupils are equal, round, and reactive to light.   Neck: Normal range of motion. Neck supple. No JVD present.   Cardiovascular: Normal  rate and regular rhythm.  Exam reveals no gallop and no friction rub.    No murmur heard.  + LVAD hum    Pulmonary/Chest: Effort normal and breath sounds normal. He is intubated. No respiratory distress. He has no wheezes. He has no rales.   Abdominal: Soft. He exhibits no distension. There is no tenderness. There is no guarding.   Musculoskeletal: Normal range of motion. He exhibits no edema.   Neurological: He is oriented to person, place, and time. No cranial nerve deficit. Coordination normal.   Skin: Skin is warm and dry. He is not diaphoretic. No erythema.   Nursing note and vitals reviewed.      Significant Labs:  CBC:    Recent Labs  Lab 08/12/17  0300   WBC 8.67   RBC 2.77*   HGB 7.5*   HCT 21.4*   *   MCV 77*   MCH 27.1   MCHC 35.0     BNP:    Recent Labs  Lab 08/11/17  0507   BNP 2,609*     CMP:    Recent Labs  Lab 08/11/17  2346 08/12/17  0300   * 152*   CALCIUM 7.9* 8.3*   ALBUMIN 2.4*  --    PROT 5.3*  --     142   K 3.7 3.7   CO2 25 23    106   BUN 91* 94*   CREATININE 2.0* 2.0*   ALKPHOS 112  --    ALT 40  --    AST 35  --    BILITOT 2.5*  --       Coagulation:     Recent Labs  Lab 08/12/17  0300   INR 5.4*   APTT 58.1*     LDH:    Recent Labs  Lab 08/10/17  0501 08/11/17  0507 08/12/17  0300   * 207 409*     Microbiology:  Microbiology Results (last 7 days)     Procedure Component Value Units Date/Time    Blood culture [[id number]]  (Susceptibility) Collected:  08/09/17 0813    Order Status:  Completed Specimen:  Blood from Line, Arterial, Right Updated:  08/12/17 1113     Blood Culture, Routine Gram stain clement bottle: Gram negative rods      Blood Culture, Routine Results called to and read back by: Emma York RN 08/10/2017  11:13     Blood Culture, Routine ESCHERICHIA COLI ESBL    Blood culture [[id number]] Collected:  08/09/17 0733    Order Status:  Completed Specimen:  Blood from Peripheral, Antecubital, Left Updated:  08/12/17 1022     Blood Culture, Routine  No Growth to date     Blood Culture, Routine No Growth to date     Blood Culture, Routine No Growth to date     Blood Culture, Routine No Growth to date    Blood culture [[id number]] Collected:  08/11/17 1326    Order Status:  Completed Specimen:  Blood from Peripheral, Forearm, Left Updated:  08/12/17 0115     Blood Culture, Routine No Growth to date    Blood culture [[id number]] Collected:  08/11/17 1326    Order Status:  Sent Specimen:  Blood from Peripheral, Forearm, Left Updated:  08/11/17 1327    Culture, Respiratory with Gram Stain [[id number]]  (Susceptibility) Collected:  08/09/17 0723    Order Status:  Completed Specimen:  Respiratory from Endotracheal Aspirate Updated:  08/11/17 1153     Respiratory Culture --     ESCHERICHIA COLI ESBL  Many       Gram Stain (Respiratory) <10 epithelial cells per low power field.     Gram Stain (Respiratory) Few WBC's     Gram Stain (Respiratory) Many Gram negative rods     Gram Stain (Respiratory) Few Gram positive rods     Gram Stain (Respiratory) Few Gram positive cocci    Urine culture [[id number]] Collected:  08/09/17 0725    Order Status:  Completed Specimen:  Urine from Urine, Catheterized Updated:  08/10/17 1128     Urine Culture, Routine No growth    Blood culture [[id number]] Collected:  08/04/17 1653    Order Status:  Completed Specimen:  Blood from Peripheral, Wrist, Right Updated:  08/09/17 2012     Blood Culture, Routine No growth after 5 days.    Blood culture [[id number]] Collected:  08/04/17 1654    Order Status:  Completed Specimen:  Blood from Peripheral, Wrist, Left Updated:  08/09/17 2012     Blood Culture, Routine No growth after 5 days.    Urine culture [[id number]] Collected:  08/04/17 1655    Order Status:  Completed Specimen:  Urine from Urine, Catheterized Updated:  08/05/17 2116     Urine Culture, Routine No growth          I have reviewed all pertinent labs within the past 24 hours.    Estimated Creatinine Clearance: 38.1 mL/min (based on Cr of  2).    Diagnostic Results:  I have reviewed and interpreted all pertinent imaging results/findings within the past 24 hours.

## 2017-08-12 NOTE — PT/OT/SLP PROGRESS
Physical Therapy  Treatment    Suman Hayden   MRN: [id number]   Admitting Diagnosis: Acute on chronic combined systolic and diastolic heart failure    PT Received On: 08/12/17  PT Start Time: 1115     PT Stop Time: 1132    PT Total Time (min): 17 min       Billable Minutes:  Therapeutic Exercise 17 min    Treatment Type: Treatment  PT/PTA: PT     PTA Visit Number: 0       General Precautions: Standard, fall, LVAD, sternal  Orthopedic Precautions: N/A   Braces:      Do you have any cultural, spiritual, Methodist conflicts, given your current situation?: none noted     Subjective:  Communicated with nurse prior to session.      Pain/Comfort  Pain Rating 1: 0/10  Pain Rating Post-Intervention 1: 0/10    Objective:   Patient found with: telemetry, arterial line, pulse ox (continuous), delgado catheter, ventilator, SCD (LVAD, CVP, B volodymyr Koby boots, vent to ET tube, OG tube, LVAD)    Functional Mobility:  Bed Mobility:   Rolling/Turning to Left:  (not tested. pt intubated and slightly sedated. RN approved ther exer in bed.)    Transfers:  Sit <> Stand Assistance:  (not tested, see above)    Gait:   Gait Distance: not tested. see above      Therapeutic Activities and Exercises:  Pt performed AAROM there exer x 4 extremities x 15 reps in supine.      AM-PAC 6 CLICK MOBILITY  How much help from another person does this patient currently need?   1 = Unable, Total/Dependent Assistance  2 = A lot, Maximum/Moderate Assistance  3 = A little, Minimum/Contact Guard/Supervision  4 = None, Modified [place]/Independent    Turning over in bed (including adjusting bedclothes, sheets and blankets)?: 2  Sitting down on and standing up from a chair with arms (e.g., wheelchair, bedside commode, etc.): 1  Moving from lying on back to sitting on the side of the bed?: 1  Moving to and from a bed to a chair (including a wheelchair)?: 1  Need to walk in hospital room?: 1  Climbing 3-5 steps with a railing?: 1  Total Score: 7    AM-PAC Raw Score  CMS G-Code Modifier Level of Impairment Assistance   6 % Total / Unable   7 - 9 CM 80 - 100% Maximal Assist   10 - 14 CL 60 - 80% Moderate Assist   15 - 19 CK 40 - 60% Moderate Assist   20 - 22 CJ 20 - 40% Minimal Assist   23 CI 1-20% SBA / CGA   24 CH 0% Independent/ Mod I     Patient left supine with all lines intact and call button in reach.    Assessment:  Suman Hayden is a 67 y.o. 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 Pt will benefit from skilled PT 6x/wk to progress with mobility and decrease complications from immobility. Pt is s/p LVAD HM3 placement , chest closure , re-intubation 17.     Rehab identified problem list/impairments: Rehab identified problem list/impairments: impaired endurance, weakness, impaired functional mobilty, gait instability, impaired balance, decreased lower extremity function    Rehab potential is good.    Activity tolerance: Good    Discharge recommendations: Discharge Facility/Level Of Care Needs: home health PT     Barriers to discharge: Barriers to Discharge: None    Equipment recommendations: Equipment Needed After Discharge:  (will determine DME needs closer to discharge)     GOALS:    Physical Therapy Goals        Problem: Physical Therapy Goal    Goal Priority Disciplines Outcome Goal Variances Interventions   Physical Therapy Goal     PT/OT, PT Ongoing (interventions implemented as appropriate)     Description:  Goals to be met by: 17     Patient will increase functional independence with mobility by performin. Supine to sit with MInimal Assistance- not met  2. Sit to supine with MInimal Assistance - not met  3. Sit to stand transfer with Minimal Assistance- not met  4. Bed to chair transfer with Minimal Assistance- not met  5. Gait  x 50 feet with Minimal Assistance. - not met  6. Lower extremity  exercise program x15 reps, with supervision, in order to increase LE strength and (I) with functional mobility. - not met                        PLAN:    Patient to be seen 6 x/week  to address the above listed problems via gait training, therapeutic activities, therapeutic exercises  Plan of Care expires: 09/09/17  Plan of Care reviewed with: patient, spouse         Astrid DUSTY Whaley, PT  08/12/2017

## 2017-08-12 NOTE — PROGRESS NOTES
[facility]  Heart Transplant  Progress Note    Patient Name: Suman Hayden  MRN: [id number]  Admission Date: 7/18/2017  Hospital Length of Stay: 25 days  Attending Physician: Sunny Downing MD  Primary Care Provider: Joe Ernst MD  Principal Problem:Acute on chronic combined systolic and diastolic heart failure    Subjective:     Interval History: Intubated. Sedated but arousable and following commands    Continuous Infusions:   DOPamine 5 mcg/kg/min (08/12/17 1100)    epinephrine infusion 0.04 mcg/kg/min (08/12/17 1100)    propofol 10 mcg/kg/min (08/12/17 1100)    TPN ADULT CENTRAL LINE CUSTOM 50 mL/hr at 08/12/17 1100    vasopressin (PITRESSIN) infusion 0.04 Units/min (08/12/17 1100)     Scheduled Meds:   ceFEPime (MAXIPIME) IVPB  1 g Intravenous Q12H    pantoprazole  40 mg Intravenous BID    sodium chloride 0.9%  10 mL Intravenous Q6H     PRN Meds:sodium chloride, acetaminophen, albumin human 5%, albuterol sulfate, bisacodyl, dextrose 50%, fentaNYL, glucagon (human recombinant), hydrALAZINE, insulin aspart, ondansetron, Flushing PICC Protocol **AND** sodium chloride 0.9% **AND** sodium chloride 0.9%    Review of patient's allergies indicates:  No Known Allergies  Objective:     Vital Signs (Most Recent):  Temp: 99.1 °F (37.3 °C) (08/12/17 1100)  Pulse: 79 (08/12/17 1137)  Resp: 17 (08/12/17 1137)  BP: (!) 78/0 (08/11/17 2300)  SpO2: 100 % (08/12/17 1137) Vital Signs (24h Range):  Temp:  [97.8 °F (36.6 °C)-99.1 °F (37.3 °C)] 99.1 °F (37.3 °C)  Pulse:  [] 79  Resp:  [11-20] 17  SpO2:  [97 %-100 %] 100 %  BP: (64-80)/(0) 78/0  Arterial Line BP: (55-83)/(49-68) 76/61     Weight: 85.5 kg (188 lb 7.9 oz)  Body mass index is 28.66 kg/m².      Intake/Output Summary (Last 24 hours) at 08/12/17 1156  Last data filed at 08/12/17 1100   Gross per 24 hour   Intake          2386.63 ml   Output             1735 ml   Net           651.63 ml       Hemodynamic Parameters:           Physical Exam    Constitutional: He is oriented to person, place, and time. He appears well-developed and well-nourished. No distress. He is sedated and intubated.   HENT:   Head: Normocephalic and atraumatic.   Eyes: EOM are normal. Pupils are equal, round, and reactive to light.   Neck: Normal range of motion. Neck supple. No JVD present.   Cardiovascular: Normal rate and regular rhythm.  Exam reveals no gallop and no friction rub.    No murmur heard.  + LVAD hum    Pulmonary/Chest: Effort normal and breath sounds normal. He is intubated. No respiratory distress. He has no wheezes. He has no rales.   Abdominal: Soft. He exhibits no distension. There is no tenderness. There is no guarding.   Musculoskeletal: Normal range of motion. He exhibits no edema.   Neurological: He is oriented to person, place, and time. No cranial nerve deficit. Coordination normal.   Skin: Skin is warm and dry. He is not diaphoretic. No erythema.   Nursing note and vitals reviewed.      Significant Labs:  CBC:    Recent Labs  Lab 08/12/17  0300   WBC 8.67   RBC 2.77*   HGB 7.5*   HCT 21.4*   *   MCV 77*   MCH 27.1   MCHC 35.0     BNP:    Recent Labs  Lab 08/11/17  0507   BNP 2,609*     CMP:    Recent Labs  Lab 08/11/17  2346 08/12/17  0300   * 152*   CALCIUM 7.9* 8.3*   ALBUMIN 2.4*  --    PROT 5.3*  --     142   K 3.7 3.7   CO2 25 23    106   BUN 91* 94*   CREATININE 2.0* 2.0*   ALKPHOS 112  --    ALT 40  --    AST 35  --    BILITOT 2.5*  --       Coagulation:     Recent Labs  Lab 08/12/17  0300   INR 5.4*   APTT 58.1*     LDH:    Recent Labs  Lab 08/10/17  0501 08/11/17  0507 08/12/17  0300   * 207 409*     Microbiology:  Microbiology Results (last 7 days)     Procedure Component Value Units Date/Time    Blood culture [[id number]]  (Susceptibility) Collected:  08/09/17 0813    Order Status:  Completed Specimen:  Blood from Line, Arterial, Right Updated:  08/12/17 1113     Blood Culture, Routine Gram stain clement bottle:  Gram negative rods      Blood Culture, Routine Results called to and read back by: Emma York RN 08/10/2017  11:13     Blood Culture, Routine ESCHERICHIA COLI ESBL    Blood culture [[id number]] Collected:  08/09/17 0733    Order Status:  Completed Specimen:  Blood from Peripheral, Antecubital, Left Updated:  08/12/17 1022     Blood Culture, Routine No Growth to date     Blood Culture, Routine No Growth to date     Blood Culture, Routine No Growth to date     Blood Culture, Routine No Growth to date    Blood culture [[id number]] Collected:  08/11/17 1326    Order Status:  Completed Specimen:  Blood from Peripheral, Forearm, Left Updated:  08/12/17 0115     Blood Culture, Routine No Growth to date    Blood culture [[id number]] Collected:  08/11/17 1326    Order Status:  Sent Specimen:  Blood from Peripheral, Forearm, Left Updated:  08/11/17 1327    Culture, Respiratory with Gram Stain [[id number]]  (Susceptibility) Collected:  08/09/17 0723    Order Status:  Completed Specimen:  Respiratory from Endotracheal Aspirate Updated:  08/11/17 1153     Respiratory Culture --     ESCHERICHIA COLI ESBL  Many       Gram Stain (Respiratory) <10 epithelial cells per low power field.     Gram Stain (Respiratory) Few WBC's     Gram Stain (Respiratory) Many Gram negative rods     Gram Stain (Respiratory) Few Gram positive rods     Gram Stain (Respiratory) Few Gram positive cocci    Urine culture [[id number]] Collected:  08/09/17 0725    Order Status:  Completed Specimen:  Urine from Urine, Catheterized Updated:  08/10/17 1128     Urine Culture, Routine No growth    Blood culture [[id number]] Collected:  08/04/17 1653    Order Status:  Completed Specimen:  Blood from Peripheral, Wrist, Right Updated:  08/09/17 2012     Blood Culture, Routine No growth after 5 days.    Blood culture [[id number]] Collected:  08/04/17 1654    Order Status:  Completed Specimen:  Blood from Peripheral, Wrist, Left Updated:  08/09/17 2012     Blood Culture,  Routine No growth after 5 days.    Urine culture [[id number]] Collected:  08/04/17 1655    Order Status:  Completed Specimen:  Urine from Urine, Catheterized Updated:  08/05/17 2116     Urine Culture, Routine No growth          I have reviewed all pertinent labs within the past 24 hours.    Estimated Creatinine Clearance: 38.1 mL/min (based on Cr of 2).    Diagnostic Results:  I have reviewed and interpreted all pertinent imaging results/findings within the past 24 hours.    Assessment and Plan:     LVAD (left ventricular assist device) present    - LVAD  III. Placed this admit 7/27/17  - CTS Primary  - chest closure (7/28/17) and extubated (7/29/17)  - Speed 5200  - LDH stable  - INR supratherputic now, holding coumadin   -Reintubated 8/9/17. Pressors and extubation per CTS   -Echo did not show pericardial effusion                  Bacteremia    -E coli bactermia. Pt on cefepime but it it resistant. Will discuss with CTS        Atrial fibrillation    -AC, Amio per C TS          Atrial tachycardia    - [id number] - 3  - c/w amiodarone  - c/w heparin gtt        AICD discharge    - appropriate in the setting of VT aggravated by underlying AT/AFL (earlier during the admission)  - 7/28 - setting of AF undersense - device reprogrammed to VVI 80  - tachy therapy turned off  - Was on amio gtt. Now on PO Amio  - EP planning to do lead revision         Hepatitis B core antibody positive since 2012    - will defer liver biopsy as CTS not requiring it  - ID consult cleared the patient for sx  - case discussed with hepatology, low suspicion for liver involvement        V-tach    - Amio        Hyperlipidemia    - Rec resuming pravastatin            Susannah Elizabeth PA-C  Heart Transplant  [facility]

## 2017-08-12 NOTE — PROGRESS NOTES
Progress Note  Surgical Intensive Care    Admit Date: 7/18/2017  Post-operative Day: 15 Days Post-Op  Hospital Day: 26    SUBJECTIVE:     Follow-up For:  Procedure(s) (LRB):  CLOSURE-STERNAL WOUND (N/A)  PLACEMENT-NEOPERICARDIUM (N/A)   S/p sternotomy closure 7/28/17    Interval history: No acute events o/n. On dopamine 5, epi 0.04, and vaso at 0.04. Intubated on minimal vent settings. UOP 50-75 cc/hr. INR down to 5.4 from 5.8 yesterday. Blood cultures resistant to cefepime.      Continuous Infusions:   DOPamine 5 mcg/kg/min (08/12/17 1100)    epinephrine infusion 0.04 mcg/kg/min (08/12/17 1100)    propofol 10 mcg/kg/min (08/12/17 1100)    TPN ADULT CENTRAL LINE CUSTOM 50 mL/hr at 08/12/17 1100    vasopressin (PITRESSIN) infusion 0.04 Units/min (08/12/17 1100)     Scheduled Meds:   ceFEPime (MAXIPIME) IVPB  1 g Intravenous Q12H    pantoprazole  40 mg Intravenous BID    sodium chloride 0.9%  10 mL Intravenous Q6H     PRN Meds:sodium chloride, sodium chloride, acetaminophen, albumin human 5%, albuterol sulfate, bisacodyl, dextrose 50%, fentaNYL, glucagon (human recombinant), hydrALAZINE, insulin aspart, ondansetron, Flushing PICC Protocol **AND** sodium chloride 0.9% **AND** sodium chloride 0.9%    Review of patient's allergies indicates:  No Known Allergies    OBJECTIVE:     Vital Signs (Most Recent)  Temp: 99.1 °F (37.3 °C) (08/12/17 1100)  Pulse: 79 (08/12/17 1230)  Resp: 20 (08/12/17 1230)  BP: (!) 70/0 (08/12/17 1200)  SpO2: 99 % (08/12/17 1230)    Vital Signs Range (Last 24H):  Temp:  [97.8 °F (36.6 °C)-99.1 °F (37.3 °C)]   Pulse:  []   Resp:  [11-20]   BP: (64-80)/(0)   SpO2:  [97 %-100 %]   Arterial Line BP: (55-83)/(49-68)     I & O (Last 24H):    Intake/Output Summary (Last 24 hours) at 08/12/17 1236  Last data filed at 08/12/17 1100   Gross per 24 hour   Intake          2386.63 ml   Output             1615 ml   Net           771.63 ml        Physical Exam   Constitutional: He appears  well-developed and well-nourished. No distress. He is intubated. He is arousable and responding to commands.  HENT:   Head: Normocephalic and atraumatic.   Eyes: Right eye exhibits no discharge. Left eye exhibits no discharge. No scleral icterus.   Neck: Normal range of motion. Neck supple.   Cardiovascular: Intact distal pulses.    LVAD hum present.   Pulmonary/Chest: Effort normal. No respiratory distress. Mechanically ventilated.   R and L meds chest tubes in place- minimal serosanguinous drainage.  Abdominal: Soft. He exhibits no distension.   Skin: Skin is warm and dry.     Laboratory (Last 24H):  CBC:    Recent Labs  Lab 08/12/17  0300   WBC 8.67   HGB 7.5*   HCT 21.4*   *     CMP:    Recent Labs  Lab 08/11/17  2346 08/12/17  0300   CALCIUM 7.9* 8.3*   ALBUMIN 2.4*  --    PROT 5.3*  --     142   K 3.7 3.7   CO2 25 23    106   BUN 91* 94*   CREATININE 2.0* 2.0*   ALKPHOS 112  --    ALT 40  --    AST 35  --    BILITOT 2.5*  --        ASSESSMENT/PLAN:     Neuro:  - Intubated  - alert and responding to commands  - wean sedation as tolerated when dennis to extubate     Resp:  - Intubated:   Vent Mode: SIMV  Oxygen Concentration (%):  [50] 50  Resp Rate Total:  [16 br/min-21 br/min] 17 br/min  Vt Set:  [500 mL] 500 mL  PEEP/CPAP:  [5 cmH20] 5 cmH20  Pressure Support:  [10 cmH20] 10 cmH20  Mean Airway Pressure:  [8 cmH20-9.9 cmH20] 8 cmH20  - on minimal vent settings  - extubation mgmt per CTS  - Possible aspiration event causing sepsis - resp cultures sent and empiric abx started.      CV:  - HDS; LVAD numbers stable  - Dopamine, epi, vaso drips on, wean as tolerated   - Hold  given GI bleed     Heme:  - Hgb/Hct 9/26   - Continue to trend  - INR down to 5.8 - Hold Coumadin, no plan for Vitamin K now  - Heparin off    ID:  - afebrile  - WBC 8.6  - Likely aspiration pneumonia - empiric cefepime and diflucan   - continue to monitor  - ESBL, will d/c cefepime, sensitive to zosyn, ertepenem and  meropenem  - ID consulted  - f/u recent blood cx     Renal:  - Singer in place  - Strict I/Os   - UOP adequate  - Lasix gtt at 10 mg/hr  - Cr 2.2 -> 2.0     FEN/GI:  - Strict NPO  - Stop bowel regimen and all other PO meds given GI bleed  - Protonix gtt  - OGT only put out 50 yesterday  - Replace lytes PRN     Endo:  - Endocrine following  - Accuchecks  - SSI     Dispo:  - Continue ICU care    Vince Murguia PGY-1  [phone]  08/12/2017

## 2017-08-12 NOTE — ASSESSMENT & PLAN NOTE
INDIRA on CKD III  -INDIRA appears to be multifactorial, Ischemic ATN from renal hypoperfusion vs. Septic ATN.  -Baseline SCr appears to be around 1.2-1.3.  He has had multiple INDIRA's in the past, likely 2/2 to poor renal perfusion from cardiomyopathy.  -had initial insult on the day of LVAD surgery on the 27th with a spike in his SCr, but it quickly resolved and returned to baseline.  His SCr stayed stable until the 1st when it increased to 1.5 and has continued to trend up until 2.3 today.   -Signs of decreased perfusion occurred on July 31st with reported drops in LVAD Flows which resolved with decrease in lasix dose and 1 unit of PRBC infusion.   -Urine Na 21.  Would expect a higher urinary sodium given that patient is on lasix infusion.  Appears that patiens is intravascularly volume depleted.  Recommend holding lasix at this time; can be given PRN for volume  -currently no acute indication for RRT.  Will continue following patient  -would recommend patient gets some more volume and appears he will be getting blood today  -will continue to monitor serial renal panels and urine output

## 2017-08-12 NOTE — PROGRESS NOTES
[facility]  Nephrology  Progress Note    Patient Name: Suman Hayden  MRN: [id number]  Admission Date: 7/18/2017  Hospital Length of Stay: 25 days  Attending Provider: Sunny Downing MD   Primary Care Physician: Joe Ernst MD  Principal Problem:Acute on chronic combined systolic and diastolic heart failure    Subjective:     HPI: Mr. Will is a 66 yo AAM with PMHx of NICM, HTN, HLD, and CKD III who presented to the hospital on 7/18 after syncopal episode at home.  He underwent LVAD placement on 7/27.  Labs that day showed a spike in his SCr to 1.5, but he quickly returned to baseline (1.2-1.3) and stayed stable until 08/01 when he increased to 1.5 and has steadily remained above baseline since, up to 2.3 on consult.  He has remained on lasix infusion with intermittent dosages of diuril to aid in diuresis.  On 08/31, there was a reported decrease in LVAD flows which resolved with decrease in lasix infusion and administration of 1 unit of PRBCs, likely causing decrease renal perfusion leading to an ischemic event.  He continues with adequate UOP on lasix gtt. He was transferred to the floor from ICU on 08/08 and was noted to become hypotensive with a doppler pressure of 58, since been requiring pressors for BP support.  Since ICU readmission he has been having increase OG tube drainage averaging around 1.5L/24h for last 2 days.  CVP recorded at 9.  Nephrology was consulted for INDIRA and decreased UOP.    Interval History: good urine output, 30-50ccs per hour per report this morning, 1990 past 24hrs, more or less matching Is & Os    Review of patient's allergies indicates:  No Known Allergies  Current Facility-Administered Medications   Medication Frequency    0.9%  NaCl infusion (for blood administration) Q24H PRN    0.9%  NaCl infusion (for blood administration) Q24H PRN    acetaminophen tablet 650 mg Q6H PRN    albumin human 5% bottle 500 mL PRN    albuterol nebulizer solution 2.5 mg Q4H PRN     bisacodyl suppository 10 mg Daily PRN    cefepime in dextrose 5 % 1 gram/50 mL IVPB 1 g Q12H    dextrose 50% injection 12.5 g PRN    DOPamine 400 mg in dextrose 5 % 250 mL infusion (premix) (NON-TITRATING) Continuous    EPINEPHrine (ADRENALIN) 4 mg in sodium chloride 0.9% 250 mL infusion Continuous    fentaNYL injection 50 mcg Q4H PRN    glucagon (human recombinant) injection 1 mg PRN    hydrALAZINE injection 10 mg Q6H PRN    insulin aspart pen 0-5 Units Q4H PRN    ondansetron injection 4 mg Q6H PRN    pantoprazole injection 40 mg BID    propofol (DIPRIVAN) 10 mg/mL infusion Continuous    sodium chloride 0.9% flush 10 mL Q6H    And    sodium chloride 0.9% flush 10 mL PRN    TPN ADULT CENTRAL LINE CUSTOM Continuous    vasopressin (PITRESSIN) 100 Units in dextrose 5 % 100 mL infusion Continuous       Objective:     Vital Signs (Most Recent):  Temp: 99 °F (37.2 °C) (08/12/17 1255)  Pulse: 79 (08/12/17 1300)  Resp: 17 (08/12/17 1300)  BP: (!) 70/0 (08/12/17 1200)  SpO2: 100 % (08/12/17 1300)  O2 Device (Oxygen Therapy): ventilator (08/12/17 1300) Vital Signs (24h Range):  Temp:  [97.8 °F (36.6 °C)-99.1 °F (37.3 °C)] 99 °F (37.2 °C)  Pulse:  [] 79  Resp:  [11-20] 17  SpO2:  [97 %-100 %] 100 %  BP: (64-80)/(0) 70/0  Arterial Line BP: (55-83)/(49-68) 72/60     Weight: 85.5 kg (188 lb 7.9 oz) (08/12/17 0302)  Body mass index is 28.66 kg/m².  Body surface area is 2.03 meters squared.    I/O last 3 completed shifts:  In: 3983.6 [I.V.:2159.1]  Out: 3260 [Urine:2910; Drains:350]    Physical Exam   Constitutional: He is oriented to person, place, and time. He appears well-developed.   HENT:   Head: Normocephalic and atraumatic.   Eyes: EOM are normal.   Neck: No JVD present.   Cardiovascular: Normal rate and regular rhythm.  Exam reveals no friction rub.    Pulmonary/Chest: He has rales.   Abdominal: Soft. He exhibits no distension.   Musculoskeletal: He exhibits edema.   Neurological: He is alert and  oriented to person, place, and time.   Skin: Skin is warm.   Psychiatric: He has a normal mood and affect.       Significant Labs:  CBC:   Recent Labs  Lab 08/12/17  0300 08/12/17  1119   WBC 8.67  --    RBC 2.77* 2.72*   HGB 7.5* 7.4*   HCT 21.4* 21.3*   * 127*   MCV 77* 78*   MCH 27.1 27.2   MCHC 35.0 34.7     CMP:   Recent Labs  Lab 08/12/17  1119   *   CALCIUM 8.1*   ALBUMIN 2.1*   PROT 5.0*      K 3.7   CO2 23      BUN 99*   CREATININE 2.1*   ALKPHOS 123   ALT 38   AST 34   BILITOT 2.5*     All labs within the past 24 hours have been reviewed.       Assessment/Plan:     Acute kidney injury superimposed on CKD    INDIRA on CKD III  -INDIRA appears to be multifactorial, Ischemic ATN from renal hypoperfusion vs. Septic ATN.  -Baseline SCr appears to be around 1.2-1.3.  He has had multiple INDIRA's in the past, likely 2/2 to poor renal perfusion from cardiomyopathy.  -had initial insult on the day of LVAD surgery on the 27th with a spike in his SCr, but it quickly resolved and returned to baseline.  His SCr stayed stable until the 1st when it increased to 1.5 and has continued to trend up until 2.3 today.   -Signs of decreased perfusion occurred on July 31st with reported drops in LVAD Flows which resolved with decrease in lasix dose and 1 unit of PRBC infusion.   -Urine Na 21.  Would expect a higher urinary sodium given that patient is on lasix infusion.  Appears that patiens is intravascularly volume depleted.  Recommend holding lasix at this time; can be given PRN for volume  -currently no acute indication for RRT.  Will continue following patient  -would recommend patient gets some more volume and appears he will be getting blood today  -will continue to monitor serial renal panels and urine output              Thank you for your consult. I will follow-up with patient. Please contact us if you have any additional questions.    Jin Bocanegra MD  Nephrology  [facility]

## 2017-08-12 NOTE — ASSESSMENT & PLAN NOTE
- LVAD HM III. Placed this admit 7/27/17  - CTS Primary  - chest closure (7/28/17) and extubated (7/29/17)  - Speed 5200  - LDH stable  - INR supratherputic now, holding coumadin   -Reintubated 8/9/17. Pressors and extubation per CTS   -Echo did not show pericardial effusion

## 2017-08-12 NOTE — PLAN OF CARE
Problem: Patient Care Overview  Goal: Plan of Care Review  Outcome: Ongoing (interventions implemented as appropriate)  Dr. Downing aware of patient's labs, vital signs, assessments, and drips.  Orders received and implemented.  Questions and concerns addressed with patient's wife.  Support offered to patient's wife.

## 2017-08-12 NOTE — PROGRESS NOTES
Daily E and M and VAD Interrogation Note    Reason for Visit:  Patient is seen in follow up for management of:  [] HeartMate II  [] Heartware [] Total artificial heart       [] ECMO           [x] Other - HM III     Interval History:  [] Interval history unobtainable due to intubation.  The [x] implant/[] explant date was 7/27/17    Events - No acute events o/n. Decreasing vasopressor requirements this AM. Intubated on minimal vent settings. Adequate UOP off lasix. Improving coagulopathy.        Review of Systems:   Negative except as above.      Medications:  Current Facility-Administered Medications   Medication Dose Route Frequency Provider Last Rate Last Dose    0.9%  NaCl infusion (for blood administration)   Intravenous Q24H PRN Shayne Espinoza MD        0.9%  NaCl infusion (for blood administration)   Intravenous Q24H PRN Edwige Clay MD        acetaminophen tablet 650 mg  650 mg Oral Q6H PRN Shayne Espinoza MD   650 mg at 08/08/17 2122    albumin human 5% bottle 500 mL  500 mL Intravenous PRN Shayne Espinoza MD   500 mL at 08/09/17 0700    albuterol nebulizer solution 2.5 mg  2.5 mg Nebulization Q4H PRN Shayne Espinoza MD        bisacodyl suppository 10 mg  10 mg Rectal Daily PRN Shayne Espinoza MD   10 mg at 08/06/17 2036    cefepime in dextrose 5 % 1 gram/50 mL IVPB 1 g  1 g Intravenous Q12H Shayne Espinoza  mL/hr at 08/12/17 0900 1 g at 08/12/17 0900    dextrose 50% injection 12.5 g  12.5 g Intravenous PRN Charbel Ritter MD        DOPamine 400 mg in dextrose 5 % 250 mL infusion (premix) (NON-TITRATING)  5 mcg/kg/min (Dosing Weight) Intravenous Continuous Shayne Espinoza MD 15 mL/hr at 08/12/17 1100 5 mcg/kg/min at 08/12/17 1100    EPINEPHrine (ADRENALIN) 4 mg in sodium chloride 0.9% 250 mL infusion  0.04 mcg/kg/min (Dosing Weight) Intravenous Continuous Shayne Espinoza MD 12 mL/hr at 08/12/17 1100 0.04 mcg/kg/min at 08/12/17 1100    fentaNYL  injection 50 mcg  50 mcg Intravenous Q4H PRN Shayne Espinoza MD   50 mcg at 08/12/17 1326    glucagon (human recombinant) injection 1 mg  1 mg Intramuscular PRN Charbel Ritter MD        hydrALAZINE injection 10 mg  10 mg Intravenous Q6H PRN Shayne Espinoza MD   10 mg at 08/08/17 1509    insulin aspart pen 0-5 Units  0-5 Units Subcutaneous Q4H PRN Charbel Ritter MD   2 Units at 08/10/17 0751    ondansetron injection 4 mg  4 mg Intravenous Q6H PRN Shayne Espinoza MD   4 mg at 08/08/17 2112    pantoprazole injection 40 mg  40 mg Intravenous BID Shayne Espinoza MD   40 mg at 08/12/17 0918    propofol (DIPRIVAN) 10 mg/mL infusion  10 mcg/kg/min (Dosing Weight) Intravenous Continuous Shayne Espinoza MD 4.8 mL/hr at 08/12/17 1100 10 mcg/kg/min at 08/12/17 1100    sodium chloride 0.9% flush 10 mL  10 mL Intravenous Q6H Sunny Downing MD   10 mL at 08/12/17 0600    And    sodium chloride 0.9% flush 10 mL  10 mL Intravenous PRN Sunny Downing MD   10 mL at 08/10/17 1151    TPN ADULT CENTRAL LINE CUSTOM   Intravenous Continuous Vince Murguia MD 50 mL/hr at 08/12/17 1100      TPN ADULT CENTRAL LINE CUSTOM   Intravenous Continuous Edwige Clay MD        vasopressin (PITRESSIN) 100 Units in dextrose 5 % 100 mL infusion  0.02 Units/min Intravenous Continuous Shayne Espinoza MD 2.4 mL/hr at 08/12/17 1100 0.04 Units/min at 08/12/17 1100     Physical Examination:  Vital Signs:   Vitals:    08/12/17 1400   BP:    Pulse: 79   Resp: 15   Temp:      Cardiovascular:  [x] Regular rate and rhythm [] Irregular []  None (MATT) []  Other  []  No edema [x]  Edema present  [x]  Clear to auscultation  []  Rales to []  Coarse  []  No rales but   [] Pedal Pulses absent  [x]  Pulses  + throughout  Skin:  Incision is [x]  Clean, dry and intact.  []  Other   Sternum:  [x]  Stable []  Unstable  Driveline(s):   [x]  Clean, dry and intact. []  Other     Labs:  BMP  Lab Results   Component Value Date    NA  141 08/12/2017    K 3.7 08/12/2017     08/12/2017    CO2 23 08/12/2017    BUN 99 (H) 08/12/2017    CREATININE 2.1 (H) 08/12/2017    CALCIUM 8.1 (L) 08/12/2017    ANIONGAP 13 08/12/2017    ESTGFRAFRICA 36.6 (A) 08/12/2017    EGFRNONAA 31.6 (A) 08/12/2017     Magnesium   Date Value Ref Range Status   08/12/2017 2.2 1.6 - 2.6 mg/dL Final     Lab Results   Component Value Date    WBC 8.67 08/12/2017    HGB 7.4 (L) 08/12/2017    HCT 21.3 (L) 08/12/2017    MCV 78 (L) 08/12/2017     (L) 08/12/2017     Lab Results   Component Value Date    INR 5.4 (HH) 08/12/2017    INR 5.8 (HH) 08/11/2017    INR 7.3 (HH) 08/10/2017     BNP   Date Value Ref Range Status   08/11/2017 2,609 (H) 0 - 99 pg/mL Final     Comment:     Values of less than 100 pg/ml are consistent with non-CHF populations.   08/09/2017 1,232 (H) 0 - 99 pg/mL Final     Comment:     Values of less than 100 pg/ml are consistent with non-CHF populations.   08/09/2017 1,232 (H) 0 - 99 pg/mL Final     Comment:     Values of less than 100 pg/ml are consistent with non-CHF populations.     LD   Date Value Ref Range Status   08/12/2017 409 (H) 110 - 260 U/L Final     Comment:     Results are increased in hemolyzed samples.   08/11/2017 207 110 - 260 U/L Final     Comment:     Results are increased in hemolyzed samples.   08/10/2017 319 (H) 110 - 260 U/L Final     Comment:     Results are increased in hemolyzed samples.     X-Rays:  [x]  I reviewed today's Chest x-ray    Procedure:  Device Interrogation including analysis of device parameters.  Current Settings   [x]  Ventricular Assist Device  []  Total Artificial Heart interrogated  Review of device function is [x]  Stable []  Other   TXP LVAD INTERROGATIONS 8/12/2017 8/12/2017 8/12/2017 8/12/2017 8/12/2017 8/12/2017 8/12/2017   Type HeartMate3 HeartMate3 HeartMate3 HeartMate3 HeartMate3 HeartMate3 HeartMate3   Flow 4.3 4.1 4.3 4.3 4.2 4.1 4.3   Speed 5100 5100 5100 5100 5100 5100 5100   PI 2.8 3.2 2.5 2.7 2.8  2.9 2.5   Power ([place]) 3.4 3.4 3.5 3.4 3.4 3.5 3.4   LSL - - - - - 4700 4700   Pulsatility - - Pulse - - Pulse Pulse   Some recent data might be hidden       Assessment:  [x]  Primary Cardiomyopathy []  Congestive Heart Failure   []  Atrial Fibrillation []  Ventricular Tachycardia   []  Aftercare cardiac device []  Long term (current) use of anticoagulants   []  Ventilator-associated pneumonia []  Pneumonia viral, unspecified   []  Pneumonia, bacterial, unspecified []  Pneumonia, organism unspecified   []  Hemorrhage of GI tract, unspecified    []  Nosebleed []  Driveline infection   []  Infection VAD device []  New onset of    []        Plan:  [x]  Interval history obtained from ICU attending team member during rounding today  []  VAD/MATT teaching performed with patient  []  Mobilization / Physical Therapy ongoing  []  Anticoagulation []  Ongoing []  Held  []  Studies ordered  []   To OR today for closure.       Total time spent was 30 minutes.  Of which more than 50 percent of the care dominated counseling and coordinating care with different team members. The VAD was interrogated and all parameters were WNL and no significant findings were found in the history. All these findings are documented in the note above.    Neuro:  - Intubated, sedated  - Alert and responsive when aroused   - Wean sedation as tolerated     Resp:  - Intubated: Vent Mode: A/C  Oxygen Concentration (%):  [50] 50  Resp Rate Total:  [16 br/min-21 br/min] 18 br/min  Vt Set:  [500 mL] 500 mL  PEEP/CPAP:  [5 cmH20] 5 cmH20  Pressure Support:  [10 cmH20] 10 cmH20  Mean Airway Pressure:  [8 cmH20-9.9 cmH20] 9 cmH20  - Minimize vent settings  - Possible aspiration event causing sepsis - resp cultures sent and empiric abx started.     CV:  - HDS; LVAD numbers stable  -   - Amio gtt stopped  - Dobutamine off  - Dopamine, epi, levo, vaso drips on   - Hold  given GI bleed    Heme:  - Hgb/Hct 9/27  - Continue to trend  - INR7.3 on repeat  this AM - Hold Coumadin  - Heparin off  ID:  - afebrile  - WBC 9.9  - Likely aspiration pneumonia - empiric cefepime and diflucan   - continue to monitor     Renal:  - Singer in place  - Strict I/Os   - Adequate UOP  - Lasix gtt off  - Cr 2.2    FEN/GI:  - Strict NPO  - Stop bowel regimen and all other PO meds given GI bleed  - Protonix 40 BID  - Replace lytes PRN  - CT scan today  - Gen surg consult    Endo:  - Endocrine following, appreciate assistance  - Accuchecks  - Insulin ssi    Dispo:  - Continue ICU care.       Shayne Espinoza MD  General Surgery PGY-2  Pager: [phone]

## 2017-08-13 LAB
ABO + RH BLD: NORMAL
ALBUMIN SERPL BCP-MCNC: 1.9 G/DL
ALBUMIN SERPL BCP-MCNC: 1.9 G/DL
ALBUMIN SERPL BCP-MCNC: 2 G/DL
ALLENS TEST: ABNORMAL
ALP SERPL-CCNC: 127 U/L
ALP SERPL-CCNC: 149 U/L
ALP SERPL-CCNC: 152 U/L
ALT SERPL W/O P-5'-P-CCNC: 37 U/L
ALT SERPL W/O P-5'-P-CCNC: 48 U/L
ALT SERPL W/O P-5'-P-CCNC: 56 U/L
ANION GAP SERPL CALC-SCNC: 11 MMOL/L
ANION GAP SERPL CALC-SCNC: 12 MMOL/L
ANION GAP SERPL CALC-SCNC: 12 MMOL/L
ANION GAP SERPL CALC-SCNC: 13 MMOL/L
ANISOCYTOSIS BLD QL SMEAR: SLIGHT
APTT BLDCRRT: 56.8 SEC
AST SERPL-CCNC: 36 U/L
AST SERPL-CCNC: 65 U/L
AST SERPL-CCNC: 77 U/L
BASOPHILS # BLD AUTO: 0.01 K/UL
BASOPHILS # BLD AUTO: 0.01 K/UL
BASOPHILS # BLD AUTO: ABNORMAL K/UL
BASOPHILS NFR BLD: 0 %
BASOPHILS NFR BLD: 0 %
BASOPHILS NFR BLD: 0.1 %
BASOPHILS NFR BLD: 0.1 %
BILIRUB SERPL-MCNC: 3.1 MG/DL
BILIRUB SERPL-MCNC: 3.2 MG/DL
BILIRUB SERPL-MCNC: 4.4 MG/DL
BLD GP AB SCN CELLS X3 SERPL QL: NORMAL
BUN SERPL-MCNC: 107 MG/DL
BUN SERPL-MCNC: 110 MG/DL
BUN SERPL-MCNC: 115 MG/DL
BUN SERPL-MCNC: 117 MG/DL
CALCIUM SERPL-MCNC: 8 MG/DL
CALCIUM SERPL-MCNC: 8.1 MG/DL
CALCIUM SERPL-MCNC: 8.1 MG/DL
CALCIUM SERPL-MCNC: 8.3 MG/DL
CHLORIDE SERPL-SCNC: 105 MMOL/L
CHLORIDE SERPL-SCNC: 106 MMOL/L
CO2 SERPL-SCNC: 23 MMOL/L
CO2 SERPL-SCNC: 23 MMOL/L
CO2 SERPL-SCNC: 24 MMOL/L
CO2 SERPL-SCNC: 26 MMOL/L
CREAT SERPL-MCNC: 2 MG/DL
CREAT SERPL-MCNC: 2.1 MG/DL
CREAT SERPL-MCNC: 2.1 MG/DL
CREAT SERPL-MCNC: 2.2 MG/DL
DELSYS: ABNORMAL
DIFFERENTIAL METHOD: ABNORMAL
DOHLE BOD BLD QL SMEAR: PRESENT
DOHLE BOD BLD QL SMEAR: PRESENT
EOSINOPHIL # BLD AUTO: 0.2 K/UL
EOSINOPHIL # BLD AUTO: 0.3 K/UL
EOSINOPHIL # BLD AUTO: ABNORMAL K/UL
EOSINOPHIL NFR BLD: 0 %
EOSINOPHIL NFR BLD: 2.1 %
EOSINOPHIL NFR BLD: 3.2 %
EOSINOPHIL NFR BLD: 6 %
ERYTHROCYTE [DISTWIDTH] IN BLOOD BY AUTOMATED COUNT: 15.7 %
ERYTHROCYTE [DISTWIDTH] IN BLOOD BY AUTOMATED COUNT: 15.9 %
ERYTHROCYTE [DISTWIDTH] IN BLOOD BY AUTOMATED COUNT: 16 %
ERYTHROCYTE [DISTWIDTH] IN BLOOD BY AUTOMATED COUNT: 16.1 %
ERYTHROCYTE [SEDIMENTATION RATE] IN BLOOD BY WESTERGREN METHOD: 14 MM/H
EST. GFR  (AFRICAN AMERICAN): 34.6 ML/MIN/1.73 M^2
EST. GFR  (AFRICAN AMERICAN): 36.6 ML/MIN/1.73 M^2
EST. GFR  (AFRICAN AMERICAN): 36.6 ML/MIN/1.73 M^2
EST. GFR  (AFRICAN AMERICAN): 38.8 ML/MIN/1.73 M^2
EST. GFR  (NON AFRICAN AMERICAN): 29.9 ML/MIN/1.73 M^2
EST. GFR  (NON AFRICAN AMERICAN): 31.6 ML/MIN/1.73 M^2
EST. GFR  (NON AFRICAN AMERICAN): 31.6 ML/MIN/1.73 M^2
EST. GFR  (NON AFRICAN AMERICAN): 33.5 ML/MIN/1.73 M^2
FIO2: 50
GIANT PLATELETS BLD QL SMEAR: PRESENT
GLUCOSE SERPL-MCNC: 140 MG/DL
GLUCOSE SERPL-MCNC: 143 MG/DL
GLUCOSE SERPL-MCNC: 146 MG/DL
GLUCOSE SERPL-MCNC: 157 MG/DL
HCO3 UR-SCNC: 26.2 MMOL/L (ref 24–28)
HCT VFR BLD AUTO: 21.5 %
HCT VFR BLD AUTO: 21.9 %
HCT VFR BLD AUTO: 22.2 %
HCT VFR BLD AUTO: 22.6 %
HGB BLD-MCNC: 7.5 G/DL
HGB BLD-MCNC: 7.6 G/DL
HGB BLD-MCNC: 7.6 G/DL
HGB BLD-MCNC: 7.9 G/DL
HYPOCHROMIA BLD QL SMEAR: ABNORMAL
HYPOCHROMIA BLD QL SMEAR: ABNORMAL
INR PPP: 5.7
LDH SERPL L TO P-CCNC: 0.79 MMOL/L (ref 0.36–1.25)
LDH SERPL L TO P-CCNC: 278 U/L
LYMPHOCYTES # BLD AUTO: 1.1 K/UL
LYMPHOCYTES # BLD AUTO: 1.2 K/UL
LYMPHOCYTES # BLD AUTO: ABNORMAL K/UL
LYMPHOCYTES NFR BLD: 12 %
LYMPHOCYTES NFR BLD: 12 %
LYMPHOCYTES NFR BLD: 15 %
LYMPHOCYTES NFR BLD: 9.5 %
MAGNESIUM SERPL-MCNC: 2.2 MG/DL
MCH RBC QN AUTO: 26.7 PG
MCH RBC QN AUTO: 26.9 PG
MCH RBC QN AUTO: 27.2 PG
MCH RBC QN AUTO: 27.4 PG
MCHC RBC AUTO-ENTMCNC: 34.2 G/DL
MCHC RBC AUTO-ENTMCNC: 34.7 G/DL
MCHC RBC AUTO-ENTMCNC: 34.9 G/DL
MCHC RBC AUTO-ENTMCNC: 35 G/DL
MCV RBC AUTO: 77 FL
MCV RBC AUTO: 78 FL
MCV RBC AUTO: 78 FL
MCV RBC AUTO: 79 FL
METAMYELOCYTES NFR BLD MANUAL: 1 %
MODE: ABNORMAL
MONOCYTES # BLD AUTO: 1 K/UL
MONOCYTES # BLD AUTO: 1.6 K/UL
MONOCYTES # BLD AUTO: ABNORMAL K/UL
MONOCYTES NFR BLD: 14 %
MONOCYTES NFR BLD: 2 %
MONOCYTES NFR BLD: 7 %
MONOCYTES NFR BLD: 9.6 %
MYELOCYTES NFR BLD MANUAL: 1 %
NEUTROPHILS # BLD AUTO: 7.4 K/UL
NEUTROPHILS # BLD AUTO: 8.4 K/UL
NEUTROPHILS NFR BLD: 64 %
NEUTROPHILS NFR BLD: 74.3 %
NEUTROPHILS NFR BLD: 75.1 %
NEUTROPHILS NFR BLD: 83 %
NEUTS BAND NFR BLD MANUAL: 2 %
NEUTS BAND NFR BLD MANUAL: 7 %
NRBC BLD-RTO: ABNORMAL /100 WBC
NRBC BLD-RTO: ABNORMAL /100 WBC
OVALOCYTES BLD QL SMEAR: ABNORMAL
PCO2 BLDA: 41.4 MMHG (ref 35–45)
PEEP: 5
PH SMN: 7.41 [PH] (ref 7.35–7.45)
PHOSPHATE SERPL-MCNC: 2.4 MG/DL
PLATELET # BLD AUTO: 117 K/UL
PLATELET # BLD AUTO: 123 K/UL
PLATELET # BLD AUTO: 135 K/UL
PLATELET # BLD AUTO: 140 K/UL
PLATELET BLD QL SMEAR: ABNORMAL
PLATELET BLD QL SMEAR: ABNORMAL
PMV BLD AUTO: 11.5 FL
PMV BLD AUTO: 11.6 FL
PMV BLD AUTO: 12.2 FL
PMV BLD AUTO: 12.5 FL
PO2 BLDA: 155 MMHG (ref 80–100)
POC BE: 2 MMOL/L
POC SATURATED O2: 99 % (ref 95–100)
POC TCO2: 27 MMOL/L (ref 23–27)
POIKILOCYTOSIS BLD QL SMEAR: SLIGHT
POLYCHROMASIA BLD QL SMEAR: ABNORMAL
POTASSIUM SERPL-SCNC: 3.6 MMOL/L
POTASSIUM SERPL-SCNC: 3.7 MMOL/L
PROT SERPL-MCNC: 5 G/DL
PROT SERPL-MCNC: 5.1 G/DL
PROT SERPL-MCNC: 5.3 G/DL
PROTHROMBIN TIME: 59.5 SEC
PS: 10
RBC # BLD AUTO: 2.74 M/UL
RBC # BLD AUTO: 2.83 M/UL
RBC # BLD AUTO: 2.85 M/UL
RBC # BLD AUTO: 2.9 M/UL
SAMPLE: ABNORMAL
SCHISTOCYTES BLD QL SMEAR: ABNORMAL
SITE: ABNORMAL
SODIUM SERPL-SCNC: 141 MMOL/L
SODIUM SERPL-SCNC: 141 MMOL/L
SODIUM SERPL-SCNC: 142 MMOL/L
SODIUM SERPL-SCNC: 143 MMOL/L
SP02: 100
SPHEROCYTES BLD QL SMEAR: ABNORMAL
TARGETS BLD QL SMEAR: ABNORMAL
VT: 500
WBC # BLD AUTO: 10.05 K/UL
WBC # BLD AUTO: 11.34 K/UL
WBC # BLD AUTO: 11.84 K/UL
WBC # BLD AUTO: 9.02 K/UL

## 2017-08-13 PROCEDURE — 94761 N-INVAS EAR/PLS OXIMETRY MLT: CPT

## 2017-08-13 PROCEDURE — 63600175 PHARM REV CODE 636 W HCPCS: Performed by: STUDENT IN AN ORGANIZED HEALTH CARE EDUCATION/TRAINING PROGRAM

## 2017-08-13 PROCEDURE — 25000003 PHARM REV CODE 250: Performed by: STUDENT IN AN ORGANIZED HEALTH CARE EDUCATION/TRAINING PROGRAM

## 2017-08-13 PROCEDURE — 80053 COMPREHEN METABOLIC PANEL: CPT

## 2017-08-13 PROCEDURE — 86900 BLOOD TYPING SEROLOGIC ABO: CPT

## 2017-08-13 PROCEDURE — 37799 UNLISTED PX VASCULAR SURGERY: CPT

## 2017-08-13 PROCEDURE — 99232 SBSQ HOSP IP/OBS MODERATE 35: CPT | Mod: ,,, | Performed by: INTERNAL MEDICINE

## 2017-08-13 PROCEDURE — 94003 VENT MGMT INPAT SUBQ DAY: CPT

## 2017-08-13 PROCEDURE — 25000003 PHARM REV CODE 250: Performed by: THORACIC SURGERY (CARDIOTHORACIC VASCULAR SURGERY)

## 2017-08-13 PROCEDURE — 27000221 HC OXYGEN, UP TO 24 HOURS

## 2017-08-13 PROCEDURE — 83735 ASSAY OF MAGNESIUM: CPT

## 2017-08-13 PROCEDURE — A4216 STERILE WATER/SALINE, 10 ML: HCPCS | Performed by: THORACIC SURGERY (CARDIOTHORACIC VASCULAR SURGERY)

## 2017-08-13 PROCEDURE — 85610 PROTHROMBIN TIME: CPT

## 2017-08-13 PROCEDURE — 86920 COMPATIBILITY TEST SPIN: CPT

## 2017-08-13 PROCEDURE — 85025 COMPLETE CBC W/AUTO DIFF WBC: CPT | Mod: 91

## 2017-08-13 PROCEDURE — 86850 RBC ANTIBODY SCREEN: CPT

## 2017-08-13 PROCEDURE — 83615 LACTATE (LD) (LDH) ENZYME: CPT

## 2017-08-13 PROCEDURE — 85027 COMPLETE CBC AUTOMATED: CPT

## 2017-08-13 PROCEDURE — 80053 COMPREHEN METABOLIC PANEL: CPT | Mod: 91

## 2017-08-13 PROCEDURE — 99900026 HC AIRWAY MAINTENANCE (STAT)

## 2017-08-13 PROCEDURE — 99233 SBSQ HOSP IP/OBS HIGH 50: CPT | Mod: ,,, | Performed by: INTERNAL MEDICINE

## 2017-08-13 PROCEDURE — 99233 SBSQ HOSP IP/OBS HIGH 50: CPT | Mod: GC,,, | Performed by: SURGERY

## 2017-08-13 PROCEDURE — 27000248 HC VAD-ADDITIONAL DAY

## 2017-08-13 PROCEDURE — 83605 ASSAY OF LACTIC ACID: CPT

## 2017-08-13 PROCEDURE — 80048 BASIC METABOLIC PNL TOTAL CA: CPT

## 2017-08-13 PROCEDURE — 94150 VITAL CAPACITY TEST: CPT

## 2017-08-13 PROCEDURE — 85007 BL SMEAR W/DIFF WBC COUNT: CPT

## 2017-08-13 PROCEDURE — 99900017 HC EXTUBATION W/PARAMETERS (STAT)

## 2017-08-13 PROCEDURE — 84100 ASSAY OF PHOSPHORUS: CPT

## 2017-08-13 PROCEDURE — C9113 INJ PANTOPRAZOLE SODIUM, VIA: HCPCS | Performed by: STUDENT IN AN ORGANIZED HEALTH CARE EDUCATION/TRAINING PROGRAM

## 2017-08-13 PROCEDURE — 20000000 HC ICU ROOM

## 2017-08-13 PROCEDURE — 85730 THROMBOPLASTIN TIME PARTIAL: CPT

## 2017-08-13 PROCEDURE — 82803 BLOOD GASES ANY COMBINATION: CPT

## 2017-08-13 RX ADMIN — Medication 10 ML: at 06:08

## 2017-08-13 RX ADMIN — PANTOPRAZOLE SODIUM 40 MG: 40 INJECTION, POWDER, FOR SOLUTION INTRAVENOUS at 08:08

## 2017-08-13 RX ADMIN — ERTAPENEM SODIUM 1 G: 1 INJECTION, POWDER, LYOPHILIZED, FOR SOLUTION INTRAMUSCULAR; INTRAVENOUS at 07:08

## 2017-08-13 RX ADMIN — CALCIUM GLUCONATE: 94 INJECTION, SOLUTION INTRAVENOUS at 11:08

## 2017-08-13 RX ADMIN — Medication 10 ML: at 12:08

## 2017-08-13 RX ADMIN — DOPAMINE HYDROCHLORIDE IN DEXTROSE 5 MCG/KG/MIN: 1.6 INJECTION, SOLUTION INTRAVENOUS at 12:08

## 2017-08-13 RX ADMIN — PANTOPRAZOLE SODIUM 40 MG: 40 INJECTION, POWDER, FOR SOLUTION INTRAVENOUS at 09:08

## 2017-08-13 RX ADMIN — FENTANYL CITRATE 50 MCG: 50 INJECTION INTRAMUSCULAR; INTRAVENOUS at 01:08

## 2017-08-13 NOTE — SUBJECTIVE & OBJECTIVE
Interval History:   Seen and examined in bed  No events overnight     Continuous Infusions:   DOPamine 5 mcg/kg/min (08/13/17 0800)    epinephrine infusion 0.04 mcg/kg/min (08/13/17 0800)    propofol 10 mcg/kg/min (08/13/17 0800)    TPN ADULT CENTRAL LINE CUSTOM 50 mL/hr at 08/13/17 0800    vasopressin (PITRESSIN) infusion 0.04 Units/min (08/13/17 0800)     Scheduled Meds:   ertapenem (INVANZ) IVPB  1 g Intravenous Q24H    pantoprazole  40 mg Intravenous BID    sodium chloride 0.9%  10 mL Intravenous Q6H     PRN Meds:sodium chloride, sodium chloride, acetaminophen, albumin human 5%, albuterol sulfate, bisacodyl, dextrose 50%, fentaNYL, glucagon (human recombinant), hydrALAZINE, insulin aspart, omnipaque, ondansetron, Flushing PICC Protocol **AND** sodium chloride 0.9% **AND** sodium chloride 0.9%    Review of patient's allergies indicates:  No Known Allergies  Objective:     Vital Signs (Most Recent):  Temp: 99 °F (37.2 °C) (08/13/17 0700)  Pulse: 79 (08/13/17 0830)  Resp: 17 (08/13/17 0830)  BP: (!) 80/0 (08/13/17 0700)  SpO2: 100 % (08/13/17 0830) Vital Signs (24h Range):  Temp:  [98.9 °F (37.2 °C)-99.9 °F (37.7 °C)] 99 °F (37.2 °C)  Pulse:  [] 79  Resp:  [14-23] 17  SpO2:  [99 %-100 %] 100 %  BP: (70-80)/(0) 80/0  Arterial Line BP: (62-82)/(51-66) 75/63     Weight: 84.7 kg (186 lb 11.7 oz)  Body mass index is 28.39 kg/m².      Intake/Output Summary (Last 24 hours) at 08/13/17 0917  Last data filed at 08/13/17 0800   Gross per 24 hour   Intake             2539 ml   Output             1760 ml   Net              779 ml       Hemodynamic Parameters:       Telemetry:     Physical Exam   Constitutional: He appears well-developed and well-nourished.   HENT:   Head: Normocephalic and atraumatic.   Et tube / OG tube in place    Eyes: EOM are normal. Pupils are equal, round, and reactive to light.   Cardiovascular: Normal rate, regular rhythm and normal heart sounds.  Exam reveals no gallop and no friction  rub.    No murmur heard.  + LVAD hum    Pulmonary/Chest: Effort normal.   Mechanical breath sounds    Abdominal: Soft. Bowel sounds are normal.   Musculoskeletal:   LUE Edema    Neurological: He is alert.   Follow commands appropriately    Nursing note and vitals reviewed.      Significant Labs:  CBC:    Recent Labs  Lab 08/13/17  0400   WBC 10.05   RBC 2.83*   HGB 7.6*   HCT 21.9*   *   MCV 77*   MCH 26.9*   MCHC 34.7     BNP:    Recent Labs  Lab 08/11/17  0507   BNP 2,609*     CMP:    Recent Labs  Lab 08/12/17  2352 08/13/17  0400   * 146*   CALCIUM 8.0* 8.1*   ALBUMIN 2.0*  --    PROT 5.0*  --     141   K 3.6 3.7   CO2 23 23    105   * 110*   CREATININE 2.0* 2.1*   ALKPHOS 127  --    ALT 37  --    AST 36  --    BILITOT 4.4*  --       Coagulation:     Recent Labs  Lab 08/13/17  0400   INR 5.7*   APTT 56.8*     LDH:    Recent Labs  Lab 08/11/17  0507 08/12/17  0300 08/13/17  0400    409* 278*     Microbiology:  Microbiology Results (last 7 days)     Procedure Component Value Units Date/Time    Blood culture [[id number]] Collected:  08/11/17 1326    Order Status:  Completed Specimen:  Blood from Peripheral, Forearm, Left Updated:  08/12/17 1812     Blood Culture, Routine No Growth to date     Blood Culture, Routine No Growth to date    Blood culture [[id number]]  (Susceptibility) Collected:  08/09/17 0813    Order Status:  Completed Specimen:  Blood from Line, Arterial, Right Updated:  08/12/17 1113     Blood Culture, Routine Gram stain clement bottle: Gram negative rods      Blood Culture, Routine Results called to and read back by: Emma York RN 08/10/2017  11:13     Blood Culture, Routine ESCHERICHIA COLI ESBL    Blood culture [[id number]] Collected:  08/09/17 0733    Order Status:  Completed Specimen:  Blood from Peripheral, Antecubital, Left Updated:  08/12/17 1022     Blood Culture, Routine No Growth to date     Blood Culture, Routine No Growth to date     Blood Culture,  Routine No Growth to date     Blood Culture, Routine No Growth to date    Blood culture [[id number]] Collected:  08/11/17 1326    Order Status:  Sent Specimen:  Blood from Peripheral, Forearm, Left Updated:  08/11/17 1327    Culture, Respiratory with Gram Stain [[id number]]  (Susceptibility) Collected:  08/09/17 0723    Order Status:  Completed Specimen:  Respiratory from Endotracheal Aspirate Updated:  08/11/17 1153     Respiratory Culture --     ESCHERICHIA COLI ESBL  Many       Gram Stain (Respiratory) <10 epithelial cells per low power field.     Gram Stain (Respiratory) Few WBC's     Gram Stain (Respiratory) Many Gram negative rods     Gram Stain (Respiratory) Few Gram positive rods     Gram Stain (Respiratory) Few Gram positive cocci    Urine culture [[id number]] Collected:  08/09/17 0725    Order Status:  Completed Specimen:  Urine from Urine, Catheterized Updated:  08/10/17 1128     Urine Culture, Routine No growth    Blood culture [[id number]] Collected:  08/04/17 1653    Order Status:  Completed Specimen:  Blood from Peripheral, Wrist, Right Updated:  08/09/17 2012     Blood Culture, Routine No growth after 5 days.    Blood culture [[id number]] Collected:  08/04/17 1654    Order Status:  Completed Specimen:  Blood from Peripheral, Wrist, Left Updated:  08/09/17 2012     Blood Culture, Routine No growth after 5 days.          I have reviewed all pertinent labs within the past 24 hours.    Estimated Creatinine Clearance: 36.2 mL/min (based on Cr of 2.1).    Diagnostic Results:  I have reviewed and interpreted all pertinent imaging results/findings within the past 24 hours.

## 2017-08-13 NOTE — PROGRESS NOTES
[facility]  Nephrology  Progress Note    Patient Name: Suman Hayden  MRN: [id number]  Admission Date: 7/18/2017  Hospital Length of Stay: 26 days  Attending Provider: Sunny Downing MD   Primary Care Physician: Joe Ernst MD  Principal Problem:Acute on chronic combined systolic and diastolic heart failure    Subjective:     HPI: Mr. Will is a 66 yo AAM with PMHx of NICM, HTN, HLD, and CKD III who presented to the hospital on 7/18 after syncopal episode at home.  He underwent LVAD placement on 7/27.  Labs that day showed a spike in his SCr to 1.5, but he quickly returned to baseline (1.2-1.3) and stayed stable until 08/01 when he increased to 1.5 and has steadily remained above baseline since, up to 2.3 on consult.  He has remained on lasix infusion with intermittent dosages of diuril to aid in diuresis.  On 08/31, there was a reported decrease in LVAD flows which resolved with decrease in lasix infusion and administration of 1 unit of PRBCs, likely causing decrease renal perfusion leading to an ischemic event.  He continues with adequate UOP on lasix gtt. He was transferred to the floor from ICU on 08/08 and was noted to become hypotensive with a doppler pressure of 58, since been requiring pressors for BP support.  Since ICU readmission he has been having increase OG tube drainage averaging around 1.5L/24h for last 2 days.  CVP recorded at 9.  Nephrology was consulted for INDIRA and decreased UOP.    Interval History: continues with good urine output, tentatively for extubation later today    Review of patient's allergies indicates:  No Known Allergies  Current Facility-Administered Medications   Medication Frequency    0.9%  NaCl infusion (for blood administration) Q24H PRN    0.9%  NaCl infusion (for blood administration) Q24H PRN    acetaminophen tablet 650 mg Q6H PRN    albumin human 5% bottle 500 mL PRN    albuterol nebulizer solution 2.5 mg Q4H PRN    bisacodyl suppository 10 mg  Daily PRN    dextrose 50% injection 12.5 g PRN    DOPamine 400 mg in dextrose 5 % 250 mL infusion (premix) (NON-TITRATING) Continuous    EPINEPHrine (ADRENALIN) 4 mg in sodium chloride 0.9% 250 mL infusion Continuous    ertapenem (INVANZ) 1 g in sodium chloride 0.9 % 100 mL IVPB (ready to mix system) Q24H    fentaNYL injection 50 mcg Q4H PRN    glucagon (human recombinant) injection 1 mg PRN    hydrALAZINE injection 10 mg Q6H PRN    insulin aspart pen 0-5 Units Q4H PRN    omnipaque oral solution 15 mL PRN    ondansetron injection 4 mg Q6H PRN    pantoprazole injection 40 mg BID    propofol (DIPRIVAN) 10 mg/mL infusion Continuous    sodium chloride 0.9% flush 10 mL Q6H    And    sodium chloride 0.9% flush 10 mL PRN    TPN ADULT CENTRAL LINE CUSTOM Continuous    vasopressin (PITRESSIN) 100 Units in dextrose 5 % 100 mL infusion Continuous       Objective:     Vital Signs (Most Recent):  Temp: 99.6 °F (37.6 °C) (08/13/17 1100)  Pulse: 79 (08/13/17 1115)  Resp: (!) 23 (08/13/17 1115)  BP: (!) 80/0 (08/13/17 1100)  SpO2: 100 % (08/13/17 1115)  O2 Device (Oxygen Therapy): ventilator (08/13/17 0710) Vital Signs (24h Range):  Temp:  [98.9 °F (37.2 °C)-99.9 °F (37.7 °C)] 99.6 °F (37.6 °C)  Pulse:  [] 79  Resp:  [14-23] 23  SpO2:  [99 %-100 %] 100 %  BP: (70-80)/(0) 80/0  Arterial Line BP: (62-88)/(51-67) 85/63     Weight: 84.7 kg (186 lb 11.7 oz) (08/13/17 0500)  Body mass index is 28.39 kg/m².  Body surface area is 2.02 meters squared.    I/O last 3 completed shifts:  In: 3807 [I.V.:1763; Blood:250; NG/GT:20]  Out: 2610 [Urine:1860; Drains:750]    Physical Exam   Constitutional: He is oriented to person, place, and time. He appears well-developed.   HENT:   Head: Normocephalic and atraumatic.   Eyes: EOM are normal.   Neck: No JVD present.   Cardiovascular: Normal rate and regular rhythm.  Exam reveals no friction rub.    Pulmonary/Chest: He has rales.   On vent   Abdominal: Soft. He exhibits no  distension.   Musculoskeletal: He exhibits edema.   Neurological: He is alert and oriented to person, place, and time.   Skin: Skin is warm.   Psychiatric: He has a normal mood and affect.       Significant Labs:  CBC:   Recent Labs  Lab 08/13/17  0400   WBC 10.05   RBC 2.83*   HGB 7.6*   HCT 21.9*   *   MCV 77*   MCH 26.9*   MCHC 34.7     CMP:   Recent Labs  Lab 08/12/17  2352 08/13/17  0400   * 146*   CALCIUM 8.0* 8.1*   ALBUMIN 2.0*  --    PROT 5.0*  --     141   K 3.6 3.7   CO2 23 23    105   * 110*   CREATININE 2.0* 2.1*   ALKPHOS 127  --    ALT 37  --    AST 36  --    BILITOT 4.4*  --      All labs within the past 24 hours have been reviewed.       Assessment/Plan:     Acute kidney injury superimposed on CKD    INDIRA on CKD III  -INDIRA appears to be multifactorial, Ischemic ATN from renal hypoperfusion vs. Septic ATN.  -Baseline SCr appears to be around 1.2-1.3.  He has had multiple INDIRA's in the past, likely 2/2 to poor renal perfusion from cardiomyopathy.  -had initial insult on the day of LVAD surgery on the 27th with a spike in his SCr, but it quickly resolved and returned to baseline.  His SCr stayed stable until the 1st when it increased to 1.5 and has continued to trend up until 2.3 today.   -Signs of decreased perfusion occurred on July 31st with reported drops in LVAD Flows which resolved with decrease in lasix dose and 1 unit of PRBC infusion.   -Urine Na 21.  Would expect a higher urinary sodium given that patient is on lasix infusion. Probably initially with intravascular volume dpletionn, this morning with positive fluid balance, received blood yesterday, CVP is 11, he is putting out good urine  -currently no acute indication for RRT.  Will continue following patient  -will continue to follow serial renal panels and input/output              Thank you for your consult. I will follow-up with patient. Please contact us if you have any additional questions.    Jin CHI  MD Daljit  Nephrology  [facility]

## 2017-08-13 NOTE — PROGRESS NOTES
Pt was extubated per MD order. Pt was placed on a 5L NC with bubble humidifier and is tolerating well, will continue to monitor.

## 2017-08-13 NOTE — HPI
Mr Hayden is a 68 yo M with PMH of NICM (EF10%) on home dobutamine 5 mcg/kg/hr and acute on chronic systolic and diastolic heart failure , HTN, HLD now s/p LVAD on 7/28 post op complicated by Ileus requiring NGT requiring re intubation due to aspiration and readmission to the ICU, was on a protonix gtt for UGI when his INR was supra therapeutic. General surgery consulted for management of ileus.     CT Abdomen showing ileus with no signs of SBO and ascites.

## 2017-08-13 NOTE — PROGRESS NOTES
Daily E and M and VAD Interrogation Note    Reason for Visit:  Patient is seen in follow up for management of:  [] HeartMate II  [] Heartware [] Total artificial heart       [] ECMO           [x] Other - HM III     Interval History:  [] Interval history unobtainable due to intubation.  The [x] implant/[] explant date was 7/27/17    Events - No acute events o/n. Remains intubated. Ileus resolved per CT. Ertapenem started for ESBL in resp cultures/gram neg rods in blood.        Review of Systems:   Negative except as above.      Medications:  Current Facility-Administered Medications   Medication Dose Route Frequency Provider Last Rate Last Dose    0.9%  NaCl infusion (for blood administration)   Intravenous Q24H PRN Shayne Espinoza MD        0.9%  NaCl infusion (for blood administration)   Intravenous Q24H PRN Edwige Clay MD        acetaminophen tablet 650 mg  650 mg Oral Q6H PRN Shayne Espinoza MD   650 mg at 08/08/17 2122    albumin human 5% bottle 500 mL  500 mL Intravenous PRN Shayne Espinoza MD   500 mL at 08/09/17 0700    albuterol nebulizer solution 2.5 mg  2.5 mg Nebulization Q4H PRN Shayne Espinoza MD        bisacodyl suppository 10 mg  10 mg Rectal Daily PRN Shayne Espinoza MD   10 mg at 08/06/17 2036    dextrose 50% injection 12.5 g  12.5 g Intravenous PRN Charbel Ritter MD        DOPamine 400 mg in dextrose 5 % 250 mL infusion (premix) (NON-TITRATING)  5 mcg/kg/min (Dosing Weight) Intravenous Continuous Shayne Espinoza MD 15 mL/hr at 08/13/17 1500 5 mcg/kg/min at 08/13/17 1500    EPINEPHrine (ADRENALIN) 4 mg in sodium chloride 0.9% 250 mL infusion  0.04 mcg/kg/min (Dosing Weight) Intravenous Continuous Shayne Espinoza MD 12 mL/hr at 08/13/17 1500 0.04 mcg/kg/min at 08/13/17 1500    ertapenem (INVANZ) 1 g in sodium chloride 0.9 % 100 mL IVPB (ready to mix system)  1 g Intravenous Q24H Edwige Clay  mL/hr at 08/12/17 1900 1 g at 08/12/17 1900     fentaNYL injection 50 mcg  50 mcg Intravenous Q4H PRN Shayne Espinoza MD   50 mcg at 08/13/17 1310    glucagon (human recombinant) injection 1 mg  1 mg Intramuscular PRN Charbel Ritter MD        hydrALAZINE injection 10 mg  10 mg Intravenous Q6H PRN Shayne Espinoza MD   10 mg at 08/08/17 1509    insulin aspart pen 0-5 Units  0-5 Units Subcutaneous Q4H PRN Charbel Ritter MD   2 Units at 08/10/17 0751    omnipaque oral solution 15 mL  15 mL Oral PRN Julio Cesar Rees MD   15 mL at 08/12/17 1758    ondansetron injection 4 mg  4 mg Intravenous Q6H PRN Shayne Espinoza MD   4 mg at 08/08/17 2112    pantoprazole injection 40 mg  40 mg Intravenous BID Shayne Espinoza MD   40 mg at 08/13/17 0950    propofol (DIPRIVAN) 10 mg/mL infusion  10 mcg/kg/min (Dosing Weight) Intravenous Continuous Shayne Espinoza MD   Stopped at 08/13/17 1100    sodium chloride 0.9% flush 10 mL  10 mL Intravenous Q6H Sunny Downing MD   10 mL at 08/13/17 1200    And    sodium chloride 0.9% flush 10 mL  10 mL Intravenous PRN Sunny Downing MD   10 mL at 08/10/17 1151    TPN ADULT CENTRAL LINE CUSTOM   Intravenous Continuous Edwige Clay MD 50 mL/hr at 08/13/17 1500      TPN ADULT CENTRAL LINE CUSTOM   Intravenous Continuous Shayne Espinoza MD        vasopressin (PITRESSIN) 100 Units in dextrose 5 % 100 mL infusion  0.04 Units/min Intravenous Continuous Sunny Downing MD 1.2 mL/hr at 08/13/17 1500 0.02 Units/min at 08/13/17 1500     Physical Examination:  Vital Signs:   Vitals:    08/13/17 1530   BP:    Pulse: 79   Resp: (!) 25   Temp:      Cardiovascular:  [x] Regular rate and rhythm [] Irregular []  None (MATT) []  Other  []  No edema [x]  Edema present  [x]  Clear to auscultation  []  Rales to []  Coarse  []  No rales but   [] Pedal Pulses absent  [x]  Pulses  + throughout  Skin:  Incision is [x]  Clean, dry and intact.  []  Other   Sternum:  [x]  Stable []  Unstable  Driveline(s):   [x]  Clean, dry  and intact. []  Other     Labs:  BMP  Lab Results   Component Value Date     08/13/2017    K 3.7 08/13/2017     08/13/2017    CO2 24 08/13/2017     (H) 08/13/2017    CREATININE 2.2 (H) 08/13/2017    CALCIUM 8.3 (L) 08/13/2017    ANIONGAP 12 08/13/2017    ESTGFRAFRICA 34.6 (A) 08/13/2017    EGFRNONAA 29.9 (A) 08/13/2017     Magnesium   Date Value Ref Range Status   08/13/2017 2.2 1.6 - 2.6 mg/dL Final     Lab Results   Component Value Date    WBC 11.34 08/13/2017    HGB 7.6 (L) 08/13/2017    HCT 22.2 (L) 08/13/2017    MCV 78 (L) 08/13/2017     (L) 08/13/2017     Lab Results   Component Value Date    INR 5.7 (HH) 08/13/2017    INR 5.4 (HH) 08/12/2017    INR 5.8 (HH) 08/11/2017     BNP   Date Value Ref Range Status   08/11/2017 2,609 (H) 0 - 99 pg/mL Final     Comment:     Values of less than 100 pg/ml are consistent with non-CHF populations.   08/09/2017 1,232 (H) 0 - 99 pg/mL Final     Comment:     Values of less than 100 pg/ml are consistent with non-CHF populations.   08/09/2017 1,232 (H) 0 - 99 pg/mL Final     Comment:     Values of less than 100 pg/ml are consistent with non-CHF populations.     LD   Date Value Ref Range Status   08/13/2017 278 (H) 110 - 260 U/L Final     Comment:     Results are increased in hemolyzed samples.   08/12/2017 409 (H) 110 - 260 U/L Final     Comment:     Results are increased in hemolyzed samples.   08/11/2017 207 110 - 260 U/L Final     Comment:     Results are increased in hemolyzed samples.     X-Rays:  [x]  I reviewed today's Chest x-ray    Procedure:  Device Interrogation including analysis of device parameters.  Current Settings   [x]  Ventricular Assist Device  []  Total Artificial Heart interrogated  Review of device function is [x]  Stable []  Other   TXP LVAD INTERROGATIONS 8/13/2017 8/13/2017 8/13/2017 8/13/2017 8/13/2017 8/13/2017 8/13/2017   Type HeartMate3 HeartMate3 HeartMate3 HeartMate3 HeartMate3 HeartMate3 HeartMate3   Flow 4.1 4.2 4.1 4.1  4.0 4.2 4.2   Speed 5100 5100 5100 5100 5100 5100 5100   PI 3.5 3.4 3.4 3.4 3.5 3.4 3.4   Power ([place]) 3.5 3.4 3.4 3.4 3.5 3.4 3.4   LSL 4700 - - - 4700 - -   Pulsatility - - - - - - -   Some recent data might be hidden       Assessment:  [x]  Primary Cardiomyopathy []  Congestive Heart Failure   []  Atrial Fibrillation []  Ventricular Tachycardia   []  Aftercare cardiac device []  Long term (current) use of anticoagulants   []  Ventilator-associated pneumonia []  Pneumonia viral, unspecified   []  Pneumonia, bacterial, unspecified []  Pneumonia, organism unspecified   []  Hemorrhage of GI tract, unspecified    []  Nosebleed []  Driveline infection   []  Infection VAD device []  New onset of    []        Plan:  [x]  Interval history obtained from ICU attending team member during rounding today  []  VAD/MATT teaching performed with patient  []  Mobilization / Physical Therapy ongoing  []  Anticoagulation []  Ongoing []  Held  []  Studies ordered  []   To OR today for closure.       Total time spent was 30 minutes.  Of which more than 50 percent of the care dominated counseling and coordinating care with different team members. The VAD was interrogated and all parameters were WNL and no significant findings were found in the history. All these findings are documented in the note above.    Neuro:  - Intubated, sedated  - Alert and responsive when aroused   - Wean sedation as tolerated     Resp:  - Intubated: Vent Mode: Spont  Oxygen Concentration (%):  [50] 50  Resp Rate Total:  [16 br/min-25 br/min] 25 br/min  Vt Set:  [500 mL-550 mL] 500 mL  PEEP/CPAP:  [5 cmH20] 5 cmH20  Pressure Support:  [5 cmH20-10 cmH20] 5 cmH20  Mean Airway Pressure:  [7.7 cmH20-10 cmH20] 7.7 cmH20  - Wean to extubate this AM  - Resp cultures with ESBL - Ertapenem started per ID     CV:  - HDS; LVAD numbers stable  -   - Amio gtt stopped  - Dobutamine off  - Dopamine, epi, levo, vaso drips on   - Hold  given GI bleed    Heme:  -  Hgb/Hct 9/27  - Continue to trend  - INR7.3 on repeat this AM - Hold Coumadin  - Heparin off  ID:  - afebrile  - Ertapenem started for ESBL pos resp culture  - continue to monitor     Renal:  - Singer in place  - Strict I/Os   - Adequate UOP  - Lasix gtt off    FEN/GI:  - Strict NPO  - Stop bowel regimen and all other PO meds given GI bleed  - Protonix 40 BID  - Replace lytes PRN  - CT scan today  - Ileus resolved     Endo:  - Endocrine following, appreciate assistance  - Accuchecks  - Insulin ssi    Dispo:  - Continue ICU care.       Shayne Espinoza MD  General Surgery PGY-2  Pager: [phone]

## 2017-08-13 NOTE — ASSESSMENT & PLAN NOTE
66 yo M PMhx non-ischemic CM on home dobutamine, HTN, HLD, s/p AICD who initially presented after syncopal episode and ICD shocks. ID was consulted a few times during patient's complicated clinical course. Patient found to have ESBL E Coli bacteremia as well as ESBL E Coli from resp cx.    - continue ertapenem  - await follow up blood cxs for clearance

## 2017-08-13 NOTE — SUBJECTIVE & OBJECTIVE
Interval History: continues with good urine output, tentatively for extubation later today    Review of patient's allergies indicates:  No Known Allergies  Current Facility-Administered Medications   Medication Frequency    0.9%  NaCl infusion (for blood administration) Q24H PRN    0.9%  NaCl infusion (for blood administration) Q24H PRN    acetaminophen tablet 650 mg Q6H PRN    albumin human 5% bottle 500 mL PRN    albuterol nebulizer solution 2.5 mg Q4H PRN    bisacodyl suppository 10 mg Daily PRN    dextrose 50% injection 12.5 g PRN    DOPamine 400 mg in dextrose 5 % 250 mL infusion (premix) (NON-TITRATING) Continuous    EPINEPHrine (ADRENALIN) 4 mg in sodium chloride 0.9% 250 mL infusion Continuous    ertapenem (INVANZ) 1 g in sodium chloride 0.9 % 100 mL IVPB (ready to mix system) Q24H    fentaNYL injection 50 mcg Q4H PRN    glucagon (human recombinant) injection 1 mg PRN    hydrALAZINE injection 10 mg Q6H PRN    insulin aspart pen 0-5 Units Q4H PRN    omnipaque oral solution 15 mL PRN    ondansetron injection 4 mg Q6H PRN    pantoprazole injection 40 mg BID    propofol (DIPRIVAN) 10 mg/mL infusion Continuous    sodium chloride 0.9% flush 10 mL Q6H    And    sodium chloride 0.9% flush 10 mL PRN    TPN ADULT CENTRAL LINE CUSTOM Continuous    vasopressin (PITRESSIN) 100 Units in dextrose 5 % 100 mL infusion Continuous       Objective:     Vital Signs (Most Recent):  Temp: 99.6 °F (37.6 °C) (08/13/17 1100)  Pulse: 79 (08/13/17 1115)  Resp: (!) 23 (08/13/17 1115)  BP: (!) 80/0 (08/13/17 1100)  SpO2: 100 % (08/13/17 1115)  O2 Device (Oxygen Therapy): ventilator (08/13/17 0710) Vital Signs (24h Range):  Temp:  [98.9 °F (37.2 °C)-99.9 °F (37.7 °C)] 99.6 °F (37.6 °C)  Pulse:  [] 79  Resp:  [14-23] 23  SpO2:  [99 %-100 %] 100 %  BP: (70-80)/(0) 80/0  Arterial Line BP: (62-88)/(51-67) 85/63     Weight: 84.7 kg (186 lb 11.7 oz) (08/13/17 0500)  Body mass index is 28.39 kg/m².  Body surface area  is 2.02 meters squared.    I/O last 3 completed shifts:  In: 3807 [I.V.:1763; Blood:250; NG/GT:20]  Out: 2610 [Urine:1860; Drains:750]    Physical Exam   Constitutional: He is oriented to person, place, and time. He appears well-developed.   HENT:   Head: Normocephalic and atraumatic.   Eyes: EOM are normal.   Neck: No JVD present.   Cardiovascular: Normal rate and regular rhythm.  Exam reveals no friction rub.    Pulmonary/Chest: He has rales.   On vent   Abdominal: Soft. He exhibits no distension.   Musculoskeletal: He exhibits edema.   Neurological: He is alert and oriented to person, place, and time.   Skin: Skin is warm.   Psychiatric: He has a normal mood and affect.       Significant Labs:  CBC:   Recent Labs  Lab 08/13/17  0400   WBC 10.05   RBC 2.83*   HGB 7.6*   HCT 21.9*   *   MCV 77*   MCH 26.9*   MCHC 34.7     CMP:   Recent Labs  Lab 08/12/17  2352 08/13/17  0400   * 146*   CALCIUM 8.0* 8.1*   ALBUMIN 2.0*  --    PROT 5.0*  --     141   K 3.6 3.7   CO2 23 23    105   * 110*   CREATININE 2.0* 2.1*   ALKPHOS 127  --    ALT 37  --    AST 36  --    BILITOT 4.4*  --      All labs within the past 24 hours have been reviewed.

## 2017-08-13 NOTE — ASSESSMENT & PLAN NOTE
NGT continues to drain bilious material   CT c/a/b with circumferential jejunal thickening possible related to volume overload   T/c gentle diuresis

## 2017-08-13 NOTE — ASSESSMENT & PLAN NOTE
INDIRA on CKD III  -INDIRA appears to be multifactorial, Ischemic ATN from renal hypoperfusion vs. Septic ATN.  -Baseline SCr appears to be around 1.2-1.3.  He has had multiple INDIRA's in the past, likely 2/2 to poor renal perfusion from cardiomyopathy.  -had initial insult on the day of LVAD surgery on the 27th with a spike in his SCr, but it quickly resolved and returned to baseline.  His SCr stayed stable until the 1st when it increased to 1.5 and has continued to trend up until 2.3 today.   -Signs of decreased perfusion occurred on July 31st with reported drops in LVAD Flows which resolved with decrease in lasix dose and 1 unit of PRBC infusion.   -Urine Na 21.  Would expect a higher urinary sodium given that patient is on lasix infusion. Probably initially with intravascular volume dpletionn, this morning with positive fluid balance, received blood yesterday, CVP is 11, he is putting out good urine  -currently no acute indication for RRT.  Will continue following patient  -will continue to follow serial renal panels and input/output

## 2017-08-13 NOTE — SUBJECTIVE & OBJECTIVE
Interval History: No events    Review of Systems   Unable to perform ROS: Intubated     Objective:     Vital Signs (Most Recent):  Temp: 99.6 °F (37.6 °C) (08/13/17 1100)  Pulse: 79 (08/13/17 1315)  Resp: (!) 23 (08/13/17 1315)  BP: (!) 80/0 (08/13/17 1100)  SpO2: 100 % (08/13/17 1315) Vital Signs (24h Range):  Temp:  [98.9 °F (37.2 °C)-99.9 °F (37.7 °C)] 99.6 °F (37.6 °C)  Pulse:  [] 79  Resp:  [14-29] 23  SpO2:  [99 %-100 %] 100 %  BP: (76-80)/(0) 80/0  Arterial Line BP: (56-88)/(25-67) 85/64     Weight: 84.7 kg (186 lb 11.7 oz)  Body mass index is 28.39 kg/m².    Estimated Creatinine Clearance: 34.5 mL/min (based on Cr of 2.2).    Physical Exam   Constitutional: He appears well-developed and well-nourished.   HENT:   Head: Normocephalic and atraumatic.   Eyes: Conjunctivae are normal. No scleral icterus.   Cardiovascular: Intact distal pulses.    No murmur heard.  Pulmonary/Chest: He has no wheezes. He has rales.   ET tube in place   Abdominal: He exhibits distension. There is no tenderness. There is no rebound and no guarding.   Musculoskeletal: He exhibits edema.   L upper ext edema > R   Lymphadenopathy:     He has no cervical adenopathy.   Neurological: He is alert.   Skin: Skin is warm.       Significant Labs: All pertinent labs within the past 24 hours have been reviewed.    Significant Imaging: I have reviewed all pertinent imaging results/findings within the past 24 hours.

## 2017-08-13 NOTE — PROGRESS NOTES
Progress Note  Surgical Intensive Care    Admit Date: 7/18/2017  Post-operative Day: 16 Days Post-Op  Hospital Day: 27    SUBJECTIVE:     Follow-up For:  Procedure(s) (LRB):  CLOSURE-STERNAL WOUND (N/A)  PLACEMENT-NEOPERICARDIUM (N/A)   S/p sternotomy closure 7/28/17    Interval history: No acute events o/n. On dopamine 5, epi 0.04, and vaso at 0.04. Intubated on minimal vent settings. UOP 50-75 cc/hr. INR down to 5.4 from 5.8 yesterday. Blood cultures resistant to cefepime.      Continuous Infusions:   DOPamine 5 mcg/kg/min (08/13/17 1500)    epinephrine infusion 0.04 mcg/kg/min (08/13/17 1500)    propofol Stopped (08/13/17 1100)    TPN ADULT CENTRAL LINE CUSTOM 50 mL/hr at 08/13/17 1500    TPN ADULT CENTRAL LINE CUSTOM      vasopressin (PITRESSIN) infusion 0.02 Units/min (08/13/17 1500)     Scheduled Meds:   ertapenem (INVANZ) IVPB  1 g Intravenous Q24H    pantoprazole  40 mg Intravenous BID    sodium chloride 0.9%  10 mL Intravenous Q6H     PRN Meds:sodium chloride, sodium chloride, acetaminophen, albumin human 5%, albuterol sulfate, bisacodyl, dextrose 50%, fentaNYL, glucagon (human recombinant), hydrALAZINE, insulin aspart, omnipaque, ondansetron, Flushing PICC Protocol **AND** sodium chloride 0.9% **AND** sodium chloride 0.9%    Review of patient's allergies indicates:  No Known Allergies    OBJECTIVE:     Vital Signs (Most Recent)  Temp: 99.4 °F (37.4 °C) (08/13/17 1500)  Pulse: 79 (08/13/17 1515)  Resp: (!) 24 (08/13/17 1515)  BP: (!) 80/0 (08/13/17 1500)  SpO2: 100 % (08/13/17 1515)    Vital Signs Range (Last 24H):  Temp:  [99 °F (37.2 °C)-99.9 °F (37.7 °C)]   Pulse:  []   Resp:  [14-29]   BP: (76-80)/(0)   SpO2:  [100 %]   Arterial Line BP: (56-88)/(25-67)     I & O (Last 24H):    Intake/Output Summary (Last 24 hours) at 08/13/17 1540  Last data filed at 08/13/17 1500   Gross per 24 hour   Intake             2409 ml   Output             2300 ml   Net              109 ml        Physical  Exam   Constitutional: He appears well-developed and well-nourished. No distress. He is intubated. He is arousable and responding to commands.  HENT:   Head: Normocephalic and atraumatic.   Eyes: Right eye exhibits no discharge. Left eye exhibits no discharge. No scleral icterus.   Neck: Normal range of motion. Neck supple.   Cardiovascular: Intact distal pulses.    LVAD hum present.   Pulmonary/Chest: Effort normal. No respiratory distress. Mechanically ventilated.   R and L meds chest tubes in place- minimal serosanguinous drainage.  Abdominal: Soft. He exhibits no distension.   Skin: Skin is warm and dry.     Laboratory (Last 24H):  CBC:    Recent Labs  Lab 08/13/17  1157   WBC 11.34   HGB 7.6*   HCT 22.2*   *     CMP:    Recent Labs  Lab 08/13/17  1157   CALCIUM 8.3*   ALBUMIN 1.9*   PROT 5.1*      K 3.7   CO2 24      *   CREATININE 2.2*   ALKPHOS 149*   ALT 48*   AST 65*   BILITOT 3.2*       ASSESSMENT/PLAN:     Neuro:  - Intubated  - alert and responding to commands  - wean sedation as tolerated when dennis to extubate     Resp:  - Intubated:   Vent Mode: Spont  Oxygen Concentration (%):  [50] 50  Resp Rate Total:  [16 br/min-25 br/min] 25 br/min  Vt Set:  [500 mL-550 mL] 500 mL  PEEP/CPAP:  [5 cmH20] 5 cmH20  Pressure Support:  [5 cmH20-10 cmH20] 5 cmH20  Mean Airway Pressure:  [7.7 cmH20-10 cmH20] 7.7 cmH20  - on minimal vent settings  - extubation mgmt per CTS  - Possible aspiration event causing sepsis - resp cultures sent and empiric abx started.      CV:  - HDS; LVAD numbers stable  - Dopamine, epi, vaso drips on, wean as tolerated   - Hold  given GI bleed     Heme:  - Hgb/Hct 9/26   - Continue to trend  - INR down to 5.8 - Hold Coumadin, no plan for Vitamin K now  - Heparin off    ID:  - afebrile  - WBC 8.6  - Likely aspiration pneumonia - empiric cefepime and diflucan   - continue to monitor  - ESBL, will d/c cefepime, sensitive to zosyn, ertepenem and meropenem  - ID  consulted  - f/u recent blood cx     Renal:  - Singer in place  - Strict I/Os   - UOP adequate  - Lasix gtt at 10 mg/hr  - Cr 2.2 -> 2.0     FEN/GI:  - Strict NPO  - Stop bowel regimen and all other PO meds given GI bleed  - Protonix gtt  - OGT only put out 50 yesterday  - Replace lytes PRN     Endo:  - Endocrine following  - Accuchecks  - SSI     Dispo:  - Continue ICU care    Avery Crystal  [facility]  P: [phone]

## 2017-08-13 NOTE — PROGRESS NOTES
[facility]  Heart Transplant  Progress Note    Patient Name: Suman Hayden  MRN: [id number]  Admission Date: 7/18/2017  Hospital Length of Stay: 26 days  Attending Physician: Sunny Downing MD  Primary Care Provider: Joe Ernst MD  Principal Problem:Acute on chronic combined systolic and diastolic heart failure    Subjective:     Interval History:   Seen and examined in bed  No events overnight     Continuous Infusions:   DOPamine 5 mcg/kg/min (08/13/17 0800)    epinephrine infusion 0.04 mcg/kg/min (08/13/17 0800)    propofol 10 mcg/kg/min (08/13/17 0800)    TPN ADULT CENTRAL LINE CUSTOM 50 mL/hr at 08/13/17 0800    vasopressin (PITRESSIN) infusion 0.04 Units/min (08/13/17 0800)     Scheduled Meds:   ertapenem (INVANZ) IVPB  1 g Intravenous Q24H    pantoprazole  40 mg Intravenous BID    sodium chloride 0.9%  10 mL Intravenous Q6H     PRN Meds:sodium chloride, sodium chloride, acetaminophen, albumin human 5%, albuterol sulfate, bisacodyl, dextrose 50%, fentaNYL, glucagon (human recombinant), hydrALAZINE, insulin aspart, omnipaque, ondansetron, Flushing PICC Protocol **AND** sodium chloride 0.9% **AND** sodium chloride 0.9%    Review of patient's allergies indicates:  No Known Allergies  Objective:     Vital Signs (Most Recent):  Temp: 99 °F (37.2 °C) (08/13/17 0700)  Pulse: 79 (08/13/17 0830)  Resp: 17 (08/13/17 0830)  BP: (!) 80/0 (08/13/17 0700)  SpO2: 100 % (08/13/17 0830) Vital Signs (24h Range):  Temp:  [98.9 °F (37.2 °C)-99.9 °F (37.7 °C)] 99 °F (37.2 °C)  Pulse:  [] 79  Resp:  [14-23] 17  SpO2:  [99 %-100 %] 100 %  BP: (70-80)/(0) 80/0  Arterial Line BP: (62-82)/(51-66) 75/63     Weight: 84.7 kg (186 lb 11.7 oz)  Body mass index is 28.39 kg/m².      Intake/Output Summary (Last 24 hours) at 08/13/17 0917  Last data filed at 08/13/17 0800   Gross per 24 hour   Intake             2539 ml   Output             1760 ml   Net              779 ml       Hemodynamic Parameters:        Telemetry:     Physical Exam   Constitutional: He appears well-developed and well-nourished.   HENT:   Head: Normocephalic and atraumatic.   Et tube / OG tube in place    Eyes: EOM are normal. Pupils are equal, round, and reactive to light.   Cardiovascular: Normal rate, regular rhythm and normal heart sounds.  Exam reveals no gallop and no friction rub.    No murmur heard.  + LVAD hum    Pulmonary/Chest: Effort normal.   Mechanical breath sounds    Abdominal: Soft. Bowel sounds are normal.   Musculoskeletal:   LUE Edema    Neurological: He is alert.   Follow commands appropriately    Nursing note and vitals reviewed.      Significant Labs:  CBC:    Recent Labs  Lab 08/13/17  0400   WBC 10.05   RBC 2.83*   HGB 7.6*   HCT 21.9*   *   MCV 77*   MCH 26.9*   MCHC 34.7     BNP:    Recent Labs  Lab 08/11/17  0507   BNP 2,609*     CMP:    Recent Labs  Lab 08/12/17  2352 08/13/17  0400   * 146*   CALCIUM 8.0* 8.1*   ALBUMIN 2.0*  --    PROT 5.0*  --     141   K 3.6 3.7   CO2 23 23    105   * 110*   CREATININE 2.0* 2.1*   ALKPHOS 127  --    ALT 37  --    AST 36  --    BILITOT 4.4*  --       Coagulation:     Recent Labs  Lab 08/13/17  0400   INR 5.7*   APTT 56.8*     LDH:    Recent Labs  Lab 08/11/17  0507 08/12/17  0300 08/13/17  0400    409* 278*     Microbiology:  Microbiology Results (last 7 days)     Procedure Component Value Units Date/Time    Blood culture [[id number]] Collected:  08/11/17 1326    Order Status:  Completed Specimen:  Blood from Peripheral, Forearm, Left Updated:  08/12/17 1812     Blood Culture, Routine No Growth to date     Blood Culture, Routine No Growth to date    Blood culture [[id number]]  (Susceptibility) Collected:  08/09/17 0813    Order Status:  Completed Specimen:  Blood from Line, Arterial, Right Updated:  08/12/17 1113     Blood Culture, Routine Gram stain clement bottle: Gram negative rods      Blood Culture, Routine Results called to and read back by:  Emma York RN 08/10/2017  11:13     Blood Culture, Routine ESCHERICHIA COLI ESBL    Blood culture [[id number]] Collected:  08/09/17 0733    Order Status:  Completed Specimen:  Blood from Peripheral, Antecubital, Left Updated:  08/12/17 1022     Blood Culture, Routine No Growth to date     Blood Culture, Routine No Growth to date     Blood Culture, Routine No Growth to date     Blood Culture, Routine No Growth to date    Blood culture [[id number]] Collected:  08/11/17 1326    Order Status:  Sent Specimen:  Blood from Peripheral, Forearm, Left Updated:  08/11/17 1327    Culture, Respiratory with Gram Stain [[id number]]  (Susceptibility) Collected:  08/09/17 0723    Order Status:  Completed Specimen:  Respiratory from Endotracheal Aspirate Updated:  08/11/17 1153     Respiratory Culture --     ESCHERICHIA COLI ESBL  Many       Gram Stain (Respiratory) <10 epithelial cells per low power field.     Gram Stain (Respiratory) Few WBC's     Gram Stain (Respiratory) Many Gram negative rods     Gram Stain (Respiratory) Few Gram positive rods     Gram Stain (Respiratory) Few Gram positive cocci    Urine culture [[id number]] Collected:  08/09/17 0725    Order Status:  Completed Specimen:  Urine from Urine, Catheterized Updated:  08/10/17 1128     Urine Culture, Routine No growth    Blood culture [[id number]] Collected:  08/04/17 1653    Order Status:  Completed Specimen:  Blood from Peripheral, Wrist, Right Updated:  08/09/17 2012     Blood Culture, Routine No growth after 5 days.    Blood culture [[id number]] Collected:  08/04/17 1654    Order Status:  Completed Specimen:  Blood from Peripheral, Wrist, Left Updated:  08/09/17 2012     Blood Culture, Routine No growth after 5 days.          I have reviewed all pertinent labs within the past 24 hours.    Estimated Creatinine Clearance: 36.2 mL/min (based on Cr of 2.1).    Diagnostic Results:  I have reviewed and interpreted all pertinent imaging results/findings within the past 24  hours.    Assessment and Plan:     Ileus    NGT continues to drain bilious material   CT c/a/b with circumferential jejunal thickening possible related to volume overload   T/c gentle diuresis         Bacteremia    -ESBL from blood culture 8/9   -Subsequent cultures NGTD   -Ertapenem ( MERRITT < 0.5)   -t/c ID consult         Atrial fibrillation    -AC, Amio per C TS          LVAD (left ventricular assist device) present    - LVAD HM III. Placed this admit 7/27/17  - CTS Primary  - chest closure (7/28/17) and extubated (7/29/17)  - Speed 5200  - LDH stable  - INR supratherputic now, holding coumadin   -Reintubated 8/9/17. Pressors and extubation per CTS   -Echo did not show pericardial effusion                  Atrial tachycardia    - [id number] - 3  - c/w amiodarone  - c/w heparin gtt        AICD discharge    - appropriate in the setting of VT aggravated by underlying AT/AFL (earlier during the admission)  - 7/28 - setting of AF undersense - device reprogrammed to VVI 80  - tachy therapy turned off  - Was on amio gtt. Now on PO Amio  - EP planning to do lead revision         Hepatitis B core antibody positive since 2012    - will defer liver biopsy as CTS not requiring it  - ID consult cleared the patient for sx  - case discussed with hepatology, low suspicion for liver involvement        V-tach    - Amio        Hyperlipidemia    - Rec resuming pravastatin            Óscar Gama MD  Heart Transplant  [facility]

## 2017-08-13 NOTE — PROGRESS NOTES
[facility]  Infectious Disease  Progress Note    Patient Name: Suman Hayden  MRN: [id number]  Admission Date: 7/18/2017  Length of Stay: 26 days  Attending Physician: Sunny Downing MD  Primary Care Provider: Joe Ernst MD    Isolation Status: Contact  Assessment/Plan:      Bacteremia    68 yo M PMhx non-ischemic CM on home dobutamine, HTN, HLD, s/p AICD who initially presented after syncopal episode and ICD shocks. ID was consulted a few times during patient's complicated clinical course. Patient found to have ESBL E Coli bacteremia as well as ESBL E Coli from resp cx.    - continue ertapenem  - await follow up blood cxs for clearance                  Thank you for your consult. I will follow-up with patient. Please contact us if you have any additional questions.    Jin Jenkins MD  Infectious Disease  [facility]    Subjective:     Principal Problem:Acute on chronic combined systolic and diastolic heart failure    HPI: This is a 67 year old male with NICM on home dobutamine, HTN, HLD, s/p medtronic who presented after ICD shocks x 2. He is now being worked up for advanced options. He currently has IABP. He is being evaluated for LVAD.  We saw patient last weekend for LVAD clearance and he was cleared at that time with vaccines updated.  We are now re-consulted for positive urine culture with enterococcus. His UA has greater than 100 WBCs. Patient denies dysuria, urinary frequency, abd pain, or flank pain. He does not have a delgado catheter in place. He is afebrile and without leukocytosis. He is scheduled for LVAD placement tomorrow and we are re-consulted for clearance.  ID was initially consulted for clearance of LVAD and again for asymptomatic bacteruria w/ enterococcus prior to LVAD placement. ID is reconsulted s/p LVAD placement for antibiotic recommendations regarding positive blood cx and resp cx for ESBL E Coli.  Interval History: No events    Review of Systems    Unable to perform ROS: Intubated     Objective:     Vital Signs (Most Recent):  Temp: 99.6 °F (37.6 °C) (08/13/17 1100)  Pulse: 79 (08/13/17 1315)  Resp: (!) 23 (08/13/17 1315)  BP: (!) 80/0 (08/13/17 1100)  SpO2: 100 % (08/13/17 1315) Vital Signs (24h Range):  Temp:  [98.9 °F (37.2 °C)-99.9 °F (37.7 °C)] 99.6 °F (37.6 °C)  Pulse:  [] 79  Resp:  [14-29] 23  SpO2:  [99 %-100 %] 100 %  BP: (76-80)/(0) 80/0  Arterial Line BP: (56-88)/(25-67) 85/64     Weight: 84.7 kg (186 lb 11.7 oz)  Body mass index is 28.39 kg/m².    Estimated Creatinine Clearance: 34.5 mL/min (based on Cr of 2.2).    Physical Exam   Constitutional: He appears well-developed and well-nourished.   HENT:   Head: Normocephalic and atraumatic.   Eyes: Conjunctivae are normal. No scleral icterus.   Cardiovascular: Intact distal pulses.    No murmur heard.  Pulmonary/Chest: He has no wheezes. He has rales.   ET tube in place   Abdominal: He exhibits distension. There is no tenderness. There is no rebound and no guarding.   Musculoskeletal: He exhibits edema.   L upper ext edema > R   Lymphadenopathy:     He has no cervical adenopathy.   Neurological: He is alert.   Skin: Skin is warm.       Significant Labs: All pertinent labs within the past 24 hours have been reviewed.    Significant Imaging: I have reviewed all pertinent imaging results/findings within the past 24 hours.

## 2017-08-13 NOTE — ASSESSMENT & PLAN NOTE
-ESBL from blood culture 8/9   -Subsequent cultures NGTD   -Ertapenem ( MERRITT < 0.5)   -t/c ID consult

## 2017-08-13 NOTE — CONSULTS
[facility]  General Surgery  Consult Note    Patient Name: Suman Hayden  MRN: [id number]  Code Status: Prior  Admission Date: 7/18/2017  Hospital Length of Stay: 25 days  Attending Physician: Sunny Downing MD  Primary Care Provider: Joe Ernst MD    Patient information was obtained from past medical records and ER records.     Inpatient consult to General Surgery  Consult performed by: SAIGE GALVEZ  Consult ordered by: HAYDE VARGAS        Subjective:     Principal Problem: Acute on chronic combined systolic and diastolic heart failure    History of Present Illness: Mr Hayden is a 66 yo M with PMH of NICM (EF10%) on home dobutamine 5 mcg/kg/hr and acute on chronic systolic and diastolic heart failure , HTN, HLD now s/p LVAD on 7/28 post op complicated by Ileus requiring NGT requiring re intubation due to aspiration and readmission to the ICU, was on a protonix gtt for UGI when his INR was supra therapeutic. General surgery consulted for management of ileus.     CT Abdomen showing ileus with no signs of SBO and ascites.     No current facility-administered medications on file prior to encounter.      Current Outpatient Prescriptions on File Prior to Encounter   Medication Sig    amiodarone (PACERONE) 200 MG Tab Take by mouth once daily.    aspirin 81 MG Chew Take 81 mg by mouth once daily.    DOBUTamine (DOBUTREX) 1,000 mg/250 mL (4,000 mcg/mL) infusion Inject 414 mcg/min into the vein continuous.    furosemide (LASIX) 40 MG tablet Take 1 tablet (40 mg total) by mouth 2 (two) times daily.    potassium chloride SA (K-DUR,KLOR-CON) 20 MEQ tablet Take 20 mEq by mouth once daily.     pravastatin (PRAVACHOL) 20 MG tablet Take 20 mg by mouth every evening.    spironolactone (ALDACTONE) 25 MG tablet Take 1 tablet (25 mg total) by mouth once daily.       Review of patient's allergies indicates:  No Known Allergies    Past Medical History:   Diagnosis Date    Cardiomyopathy      Hyperlipidemia     Hypertension     Obesity     S/P implantation of automatic cardioverter/defibrillator (AICD)     Ventricular tachycardia      Past Surgical History:   Procedure Laterality Date    CARDIAC CATHETERIZATION      CARDIAC DEFIBRILLATOR PLACEMENT       Family History     None        Social History Main Topics    Smoking status: Never Smoker    Smokeless tobacco: Never Used    Alcohol use No    Drug use: Unknown    Sexual activity: Not on file     Review of Systems   Unable to perform ROS: Intubated     Objective:     Vital Signs (Most Recent):  Temp: 98.9 °F (37.2 °C) (08/12/17 1500)  Pulse: (!) 121 (08/12/17 2138)  Resp: 14 (08/12/17 2138)  BP: (!) 70/0 (08/12/17 1200)  SpO2: 100 % (08/12/17 1830) Vital Signs (24h Range):  Temp:  [97.8 °F (36.6 °C)-99.1 °F (37.3 °C)] 98.9 °F (37.2 °C)  Pulse:  [] 121  Resp:  [12-21] 14  SpO2:  [99 %-100 %] 100 %  BP: (70-78)/(0) 70/0  Arterial Line BP: (57-83)/(49-68) 74/60     Weight: 85.5 kg (188 lb 7.9 oz)  Body mass index is 28.66 kg/m².    Physical Exam     General: Intubated  CV: RRR, S1, S2 no murmur or gallops   Pulm: mechanical respiratory sounds  Abd: soft, distended, non tender.  Ext: wwp      Significant Labs:  CBC:   Recent Labs  Lab 08/12/17  1840   WBC 10.35   RBC 3.01*   HGB 8.1*   HCT 23.1*   *   MCV 77*   MCH 26.9*   MCHC 35.1     CMP:   Recent Labs  Lab 08/12/17  1840   *   CALCIUM 8.1*   ALBUMIN 2.0*   PROT 5.0*      K 3.8   CO2 23      *   CREATININE 2.0*   ALKPHOS 128   ALT 37   AST 34   BILITOT 4.2*       Significant Diagnostics:    CT Abd/Pelvis    Apparent circumferential wall thickening involving the jejunum, favored to represent sequela of volume overload versus fluid imbalance noting new small dependent pleural effusions, worsening ascites and worsening generalized body wall edema. While an early infectious versus inflammatory enteritis is possible, this is thought to be less likely. No  imaging findings to suggest bowel obstruction or ileus.    Additional findings include:  - Cardiomegaly, AICD, LVAD.  - ET tube, right IJ CVC, right-sided PICC line and enteric catheter as above.  - Colonic diverticulosis without associated inflammatory changes.  - Basilar atelectasis versus consolidation.  - Gallbladder sludge versus layering stones.  - Prominent pulmonary arteries, a finding that can be seen in the setting of pulmonary arterial hypertension.    Assessment/Plan:     Ileus    Mr Hayden is a 66 yo M with PMH of NICM (EF10%) on home dobutamine 5 mcg/kg/hr and acute on chronic systolic and diastolic heart failure , HTN, HLD now s/p LVAD on 7/28 complicated by ileus.     - CT Abd/Pelvis showing ileus with no signs of obstruction, large stool burden in the colon as well as ascites.   - No acute surgical intervention needed a this time.   - Would recommend to continue with NGT to LIWS and enemas.   - Thank you for the consult, please call if any questions.     Criselda Kahn MD  General Surgery PGY IV  Beeper: [phone]

## 2017-08-13 NOTE — SUBJECTIVE & OBJECTIVE
No current facility-administered medications on file prior to encounter.      Current Outpatient Prescriptions on File Prior to Encounter   Medication Sig    amiodarone (PACERONE) 200 MG Tab Take by mouth once daily.    aspirin 81 MG Chew Take 81 mg by mouth once daily.    DOBUTamine (DOBUTREX) 1,000 mg/250 mL (4,000 mcg/mL) infusion Inject 414 mcg/min into the vein continuous.    furosemide (LASIX) 40 MG tablet Take 1 tablet (40 mg total) by mouth 2 (two) times daily.    potassium chloride SA (K-DUR,KLOR-CON) 20 MEQ tablet Take 20 mEq by mouth once daily.     pravastatin (PRAVACHOL) 20 MG tablet Take 20 mg by mouth every evening.    spironolactone (ALDACTONE) 25 MG tablet Take 1 tablet (25 mg total) by mouth once daily.       Review of patient's allergies indicates:  No Known Allergies    Past Medical History:   Diagnosis Date    Cardiomyopathy     Hyperlipidemia     Hypertension     Obesity     S/P implantation of automatic cardioverter/defibrillator (AICD)     Ventricular tachycardia      Past Surgical History:   Procedure Laterality Date    CARDIAC CATHETERIZATION      CARDIAC DEFIBRILLATOR PLACEMENT       Family History     None        Social History Main Topics    Smoking status: Never Smoker    Smokeless tobacco: Never Used    Alcohol use No    Drug use: Unknown    Sexual activity: Not on file     Review of Systems   Unable to perform ROS: Intubated     Objective:     Vital Signs (Most Recent):  Temp: 98.9 °F (37.2 °C) (08/12/17 1500)  Pulse: (!) 121 (08/12/17 2138)  Resp: 14 (08/12/17 2138)  BP: (!) 70/0 (08/12/17 1200)  SpO2: 100 % (08/12/17 1830) Vital Signs (24h Range):  Temp:  [97.8 °F (36.6 °C)-99.1 °F (37.3 °C)] 98.9 °F (37.2 °C)  Pulse:  [] 121  Resp:  [12-21] 14  SpO2:  [99 %-100 %] 100 %  BP: (70-78)/(0) 70/0  Arterial Line BP: (57-83)/(49-68) 74/60     Weight: 85.5 kg (188 lb 7.9 oz)  Body mass index is 28.66 kg/m².    Physical Exam     General: Intubated  CV: RRR, S1, S2  no murmur or gallops   Pulm: mechanical respiratory sounds  Abd: soft, distended, non tender.  Ext: wwp      Significant Labs:  CBC:   Recent Labs  Lab 08/12/17  1840   WBC 10.35   RBC 3.01*   HGB 8.1*   HCT 23.1*   *   MCV 77*   MCH 26.9*   MCHC 35.1     CMP:   Recent Labs  Lab 08/12/17  1840   *   CALCIUM 8.1*   ALBUMIN 2.0*   PROT 5.0*      K 3.8   CO2 23      *   CREATININE 2.0*   ALKPHOS 128   ALT 37   AST 34   BILITOT 4.2*       Significant Diagnostics:    CT Abd/Pelvis    Apparent circumferential wall thickening involving the jejunum, favored to represent sequela of volume overload versus fluid imbalance noting new small dependent pleural effusions, worsening ascites and worsening generalized body wall edema. While an early infectious versus inflammatory enteritis is possible, this is thought to be less likely. No imaging findings to suggest bowel obstruction or ileus.    Additional findings include:  - Cardiomegaly, AICD, LVAD.  - ET tube, right IJ CVC, right-sided PICC line and enteric catheter as above.  - Colonic diverticulosis without associated inflammatory changes.  - Basilar atelectasis versus consolidation.  - Gallbladder sludge versus layering stones.  - Prominent pulmonary arteries, a finding that can be seen in the setting of pulmonary arterial hypertension.

## 2017-08-14 PROBLEM — K56.7 ILEUS: Status: RESOLVED | Noted: 2017-08-12 | Resolved: 2017-08-14

## 2017-08-14 PROBLEM — N18.30 ACUTE RENAL FAILURE WITH ACUTE TUBULAR NECROSIS SUPERIMPOSED ON STAGE 3 CHRONIC KIDNEY DISEASE: Status: ACTIVE | Noted: 2017-08-14

## 2017-08-14 PROBLEM — N17.0 ACUTE RENAL FAILURE WITH ACUTE TUBULAR NECROSIS SUPERIMPOSED ON STAGE 3 CHRONIC KIDNEY DISEASE: Status: ACTIVE | Noted: 2017-08-14

## 2017-08-14 LAB
ALBUMIN SERPL BCP-MCNC: 1.7 G/DL
ALBUMIN SERPL BCP-MCNC: 1.8 G/DL
ALP SERPL-CCNC: 151 U/L
ALP SERPL-CCNC: 151 U/L
ALP SERPL-CCNC: 152 U/L
ALP SERPL-CCNC: 157 U/L
ALP SERPL-CCNC: 162 U/L
ALT SERPL W/O P-5'-P-CCNC: 66 U/L
ALT SERPL W/O P-5'-P-CCNC: 70 U/L
ALT SERPL W/O P-5'-P-CCNC: 70 U/L
ALT SERPL W/O P-5'-P-CCNC: 77 U/L
ALT SERPL W/O P-5'-P-CCNC: 89 U/L
ANION GAP SERPL CALC-SCNC: 11 MMOL/L
ANION GAP SERPL CALC-SCNC: 13 MMOL/L
ANION GAP SERPL CALC-SCNC: 9 MMOL/L
ANISOCYTOSIS BLD QL SMEAR: SLIGHT
APTT BLDCRRT: 43.7 SEC
AST SERPL-CCNC: 101 U/L
AST SERPL-CCNC: 101 U/L
AST SERPL-CCNC: 113 U/L
AST SERPL-CCNC: 144 U/L
AST SERPL-CCNC: 97 U/L
BACTERIA BLD CULT: NORMAL
BASO STIPL BLD QL SMEAR: ABNORMAL
BASO STIPL BLD QL SMEAR: ABNORMAL
BASOPHILS # BLD AUTO: 0.01 K/UL
BASOPHILS # BLD AUTO: 0.02 K/UL
BASOPHILS # BLD AUTO: ABNORMAL K/UL
BASOPHILS NFR BLD: 0 %
BASOPHILS NFR BLD: 0.1 %
BASOPHILS NFR BLD: 0.2 %
BILIRUB DIRECT SERPL-MCNC: 2.9 MG/DL
BILIRUB SERPL-MCNC: 3.3 MG/DL
BILIRUB SERPL-MCNC: 3.5 MG/DL
BILIRUB SERPL-MCNC: 3.5 MG/DL
BILIRUB SERPL-MCNC: 3.7 MG/DL
BILIRUB SERPL-MCNC: 4.2 MG/DL
BLD PROD TYP BPU: NORMAL
BLOOD UNIT EXPIRATION DATE: NORMAL
BLOOD UNIT TYPE CODE: 5100
BLOOD UNIT TYPE: NORMAL
BNP SERPL-MCNC: 2873 PG/ML
BUN SERPL-MCNC: 112 MG/DL
BUN SERPL-MCNC: 112 MG/DL
BUN SERPL-MCNC: 119 MG/DL
BUN SERPL-MCNC: 120 MG/DL
BUN SERPL-MCNC: 122 MG/DL
CALCIUM SERPL-MCNC: 7.9 MG/DL
CALCIUM SERPL-MCNC: 8.1 MG/DL
CALCIUM SERPL-MCNC: 8.2 MG/DL
CHLORIDE SERPL-SCNC: 107 MMOL/L
CHLORIDE SERPL-SCNC: 108 MMOL/L
CHLORIDE SERPL-SCNC: 108 MMOL/L
CHLORIDE SERPL-SCNC: 109 MMOL/L
CHLORIDE SERPL-SCNC: 109 MMOL/L
CO2 SERPL-SCNC: 22 MMOL/L
CO2 SERPL-SCNC: 24 MMOL/L
CO2 SERPL-SCNC: 24 MMOL/L
CO2 SERPL-SCNC: 25 MMOL/L
CO2 SERPL-SCNC: 27 MMOL/L
CODING SYSTEM: NORMAL
CREAT SERPL-MCNC: 2.1 MG/DL
CREAT SERPL-MCNC: 2.2 MG/DL
CREAT SERPL-MCNC: 2.2 MG/DL
CRP SERPL-MCNC: 138.3 MG/L
DIFFERENTIAL METHOD: ABNORMAL
DISPENSE STATUS: NORMAL
DOHLE BOD BLD QL SMEAR: PRESENT
EOSINOPHIL # BLD AUTO: 0.1 K/UL
EOSINOPHIL # BLD AUTO: ABNORMAL K/UL
EOSINOPHIL NFR BLD: 0.7 %
EOSINOPHIL NFR BLD: 0.8 %
EOSINOPHIL NFR BLD: 1 %
EOSINOPHIL NFR BLD: 1 %
EOSINOPHIL NFR BLD: 1.1 %
EOSINOPHIL NFR BLD: 1.1 %
ERYTHROCYTE [DISTWIDTH] IN BLOOD BY AUTOMATED COUNT: 16 %
ERYTHROCYTE [DISTWIDTH] IN BLOOD BY AUTOMATED COUNT: 16.2 %
ERYTHROCYTE [DISTWIDTH] IN BLOOD BY AUTOMATED COUNT: 16.2 %
ERYTHROCYTE [DISTWIDTH] IN BLOOD BY AUTOMATED COUNT: 16.3 %
EST. GFR  (AFRICAN AMERICAN): 34.6 ML/MIN/1.73 M^2
EST. GFR  (AFRICAN AMERICAN): 34.6 ML/MIN/1.73 M^2
EST. GFR  (AFRICAN AMERICAN): 36.6 ML/MIN/1.73 M^2
EST. GFR  (NON AFRICAN AMERICAN): 29.9 ML/MIN/1.73 M^2
EST. GFR  (NON AFRICAN AMERICAN): 29.9 ML/MIN/1.73 M^2
EST. GFR  (NON AFRICAN AMERICAN): 31.6 ML/MIN/1.73 M^2
GIANT PLATELETS BLD QL SMEAR: PRESENT
GLUCOSE SERPL-MCNC: 123 MG/DL
GLUCOSE SERPL-MCNC: 123 MG/DL
GLUCOSE SERPL-MCNC: 146 MG/DL
GLUCOSE SERPL-MCNC: 154 MG/DL
GLUCOSE SERPL-MCNC: 154 MG/DL
HCT VFR BLD AUTO: 18.7 %
HCT VFR BLD AUTO: 19.9 %
HCT VFR BLD AUTO: 19.9 %
HCT VFR BLD AUTO: 20.2 %
HCT VFR BLD AUTO: 21.7 %
HCT VFR BLD AUTO: 21.8 %
HGB BLD-MCNC: 6.5 G/DL
HGB BLD-MCNC: 6.8 G/DL
HGB BLD-MCNC: 6.8 G/DL
HGB BLD-MCNC: 7 G/DL
HGB BLD-MCNC: 7.5 G/DL
HGB BLD-MCNC: 7.6 G/DL
HYPOCHROMIA BLD QL SMEAR: ABNORMAL
INR PPP: 4.1
LDH SERPL L TO P-CCNC: 279 U/L
LYMPHOCYTES # BLD AUTO: 1.1 K/UL
LYMPHOCYTES # BLD AUTO: 1.3 K/UL
LYMPHOCYTES # BLD AUTO: 1.3 K/UL
LYMPHOCYTES # BLD AUTO: 1.4 K/UL
LYMPHOCYTES # BLD AUTO: 1.4 K/UL
LYMPHOCYTES # BLD AUTO: ABNORMAL K/UL
LYMPHOCYTES NFR BLD: 10 %
LYMPHOCYTES NFR BLD: 10.1 %
LYMPHOCYTES NFR BLD: 10.1 %
LYMPHOCYTES NFR BLD: 11.4 %
LYMPHOCYTES NFR BLD: 12.5 %
LYMPHOCYTES NFR BLD: 8.3 %
MAGNESIUM SERPL-MCNC: 2.4 MG/DL
MCH RBC QN AUTO: 26.7 PG
MCH RBC QN AUTO: 26.7 PG
MCH RBC QN AUTO: 27.1 PG
MCH RBC QN AUTO: 27.5 PG
MCH RBC QN AUTO: 27.5 PG
MCH RBC QN AUTO: 27.9 PG
MCHC RBC AUTO-ENTMCNC: 34.2 G/DL
MCHC RBC AUTO-ENTMCNC: 34.2 G/DL
MCHC RBC AUTO-ENTMCNC: 34.6 G/DL
MCHC RBC AUTO-ENTMCNC: 34.7 G/DL
MCHC RBC AUTO-ENTMCNC: 34.8 G/DL
MCHC RBC AUTO-ENTMCNC: 34.9 G/DL
MCV RBC AUTO: 78 FL
MCV RBC AUTO: 79 FL
MCV RBC AUTO: 80 FL
MCV RBC AUTO: 80 FL
MONOCYTES # BLD AUTO: 1.2 K/UL
MONOCYTES # BLD AUTO: 1.3 K/UL
MONOCYTES # BLD AUTO: 1.4 K/UL
MONOCYTES # BLD AUTO: 1.6 K/UL
MONOCYTES # BLD AUTO: 1.6 K/UL
MONOCYTES # BLD AUTO: ABNORMAL K/UL
MONOCYTES NFR BLD: 11 %
MONOCYTES NFR BLD: 12.2 %
MONOCYTES NFR BLD: 12.2 %
MONOCYTES NFR BLD: 6 %
MONOCYTES NFR BLD: 9.1 %
MONOCYTES NFR BLD: 9.5 %
MYELOCYTES NFR BLD MANUAL: 0.5 %
NEUTROPHILS # BLD AUTO: 10.6 K/UL
NEUTROPHILS # BLD AUTO: 11.3 K/UL
NEUTROPHILS # BLD AUTO: 9.5 K/UL
NEUTROPHILS # BLD AUTO: 9.8 K/UL
NEUTROPHILS # BLD AUTO: 9.8 K/UL
NEUTROPHILS NFR BLD: 76.4 %
NEUTROPHILS NFR BLD: 76.5 %
NEUTROPHILS NFR BLD: 76.5 %
NEUTROPHILS NFR BLD: 79 %
NEUTROPHILS NFR BLD: 80.1 %
NEUTROPHILS NFR BLD: 81.3 %
NEUTS BAND NFR BLD MANUAL: 1 %
NRBC BLD-RTO: ABNORMAL /100 WBC
OVALOCYTES BLD QL SMEAR: ABNORMAL
OVALOCYTES BLD QL SMEAR: ABNORMAL
PHOSPHATE SERPL-MCNC: 2.3 MG/DL
PHOSPHATE SERPL-MCNC: 2.5 MG/DL
PLATELET # BLD AUTO: 115 K/UL
PLATELET # BLD AUTO: 116 K/UL
PLATELET # BLD AUTO: 120 K/UL
PLATELET # BLD AUTO: 120 K/UL
PLATELET # BLD AUTO: 129 K/UL
PLATELET # BLD AUTO: 129 K/UL
PLATELET BLD QL SMEAR: ABNORMAL
PMV BLD AUTO: 11.3 FL
PMV BLD AUTO: 11.8 FL
PMV BLD AUTO: 11.8 FL
PMV BLD AUTO: 12 FL
PMV BLD AUTO: 12.4 FL
PMV BLD AUTO: 13.3 FL
POCT GLUCOSE: 134 MG/DL (ref 70–110)
POCT GLUCOSE: 136 MG/DL (ref 70–110)
POCT GLUCOSE: 138 MG/DL (ref 70–110)
POCT GLUCOSE: 144 MG/DL (ref 70–110)
POCT GLUCOSE: 146 MG/DL (ref 70–110)
POCT GLUCOSE: 147 MG/DL (ref 70–110)
POCT GLUCOSE: 150 MG/DL (ref 70–110)
POCT GLUCOSE: 151 MG/DL (ref 70–110)
POCT GLUCOSE: 152 MG/DL (ref 70–110)
POCT GLUCOSE: 155 MG/DL (ref 70–110)
POCT GLUCOSE: 156 MG/DL (ref 70–110)
POCT GLUCOSE: 156 MG/DL (ref 70–110)
POCT GLUCOSE: 157 MG/DL (ref 70–110)
POCT GLUCOSE: 158 MG/DL (ref 70–110)
POCT GLUCOSE: 164 MG/DL (ref 70–110)
POCT GLUCOSE: 164 MG/DL (ref 70–110)
POCT GLUCOSE: 168 MG/DL (ref 70–110)
POCT GLUCOSE: 176 MG/DL (ref 70–110)
POCT GLUCOSE: 183 MG/DL (ref 70–110)
POIKILOCYTOSIS BLD QL SMEAR: SLIGHT
POIKILOCYTOSIS BLD QL SMEAR: SLIGHT
POLYCHROMASIA BLD QL SMEAR: ABNORMAL
POLYCHROMASIA BLD QL SMEAR: ABNORMAL
POTASSIUM SERPL-SCNC: 3.7 MMOL/L
POTASSIUM SERPL-SCNC: 3.8 MMOL/L
POTASSIUM SERPL-SCNC: 3.8 MMOL/L
POTASSIUM SERPL-SCNC: 3.9 MMOL/L
POTASSIUM SERPL-SCNC: 4 MMOL/L
PREALB SERPL-MCNC: 6 MG/DL
PROT SERPL-MCNC: 5.1 G/DL
PROT SERPL-MCNC: 5.2 G/DL
PROTHROMBIN TIME: 41.6 SEC
RBC # BLD AUTO: 2.36 M/UL
RBC # BLD AUTO: 2.55 M/UL
RBC # BLD AUTO: 2.55 M/UL
RBC # BLD AUTO: 2.58 M/UL
RBC # BLD AUTO: 2.72 M/UL
RBC # BLD AUTO: 2.73 M/UL
SODIUM SERPL-SCNC: 142 MMOL/L
SODIUM SERPL-SCNC: 143 MMOL/L
SODIUM SERPL-SCNC: 143 MMOL/L
SODIUM SERPL-SCNC: 145 MMOL/L
SODIUM SERPL-SCNC: 145 MMOL/L
TARGETS BLD QL SMEAR: ABNORMAL
TOXIC GRANULES BLD QL SMEAR: PRESENT
TRANS ERYTHROCYTES VOL PATIENT: NORMAL ML
WBC # BLD AUTO: 12.35 K/UL
WBC # BLD AUTO: 12.53 K/UL
WBC # BLD AUTO: 12.91 K/UL
WBC # BLD AUTO: 12.91 K/UL
WBC # BLD AUTO: 13.06 K/UL
WBC # BLD AUTO: 14.2 K/UL

## 2017-08-14 PROCEDURE — 93750 INTERROGATION VAD IN PERSON: CPT | Performed by: THORACIC SURGERY (CARDIOTHORACIC VASCULAR SURGERY)

## 2017-08-14 PROCEDURE — 80076 HEPATIC FUNCTION PANEL: CPT

## 2017-08-14 PROCEDURE — 93750 INTERROGATION VAD IN PERSON: CPT | Mod: ,,, | Performed by: THORACIC SURGERY (CARDIOTHORACIC VASCULAR SURGERY)

## 2017-08-14 PROCEDURE — 80048 BASIC METABOLIC PNL TOTAL CA: CPT

## 2017-08-14 PROCEDURE — 25000003 PHARM REV CODE 250: Performed by: STUDENT IN AN ORGANIZED HEALTH CARE EDUCATION/TRAINING PROGRAM

## 2017-08-14 PROCEDURE — C9113 INJ PANTOPRAZOLE SODIUM, VIA: HCPCS | Performed by: STUDENT IN AN ORGANIZED HEALTH CARE EDUCATION/TRAINING PROGRAM

## 2017-08-14 PROCEDURE — 83880 ASSAY OF NATRIURETIC PEPTIDE: CPT

## 2017-08-14 PROCEDURE — 85007 BL SMEAR W/DIFF WBC COUNT: CPT

## 2017-08-14 PROCEDURE — 99232 SBSQ HOSP IP/OBS MODERATE 35: CPT | Mod: ,,, | Performed by: INTERNAL MEDICINE

## 2017-08-14 PROCEDURE — 99233 SBSQ HOSP IP/OBS HIGH 50: CPT | Mod: 24,,, | Performed by: THORACIC SURGERY (CARDIOTHORACIC VASCULAR SURGERY)

## 2017-08-14 PROCEDURE — 80053 COMPREHEN METABOLIC PANEL: CPT | Mod: 91

## 2017-08-14 PROCEDURE — 63600175 PHARM REV CODE 636 W HCPCS: Performed by: STUDENT IN AN ORGANIZED HEALTH CARE EDUCATION/TRAINING PROGRAM

## 2017-08-14 PROCEDURE — 85027 COMPLETE CBC AUTOMATED: CPT

## 2017-08-14 PROCEDURE — 27000221 HC OXYGEN, UP TO 24 HOURS

## 2017-08-14 PROCEDURE — B4185 PARENTERAL SOL 10 GM LIPIDS: HCPCS | Performed by: STUDENT IN AN ORGANIZED HEALTH CARE EDUCATION/TRAINING PROGRAM

## 2017-08-14 PROCEDURE — 27000248 HC VAD-ADDITIONAL DAY

## 2017-08-14 PROCEDURE — P9021 RED BLOOD CELLS UNIT: HCPCS

## 2017-08-14 PROCEDURE — 85730 THROMBOPLASTIN TIME PARTIAL: CPT

## 2017-08-14 PROCEDURE — 86140 C-REACTIVE PROTEIN: CPT

## 2017-08-14 PROCEDURE — 25000003 PHARM REV CODE 250: Performed by: THORACIC SURGERY (CARDIOTHORACIC VASCULAR SURGERY)

## 2017-08-14 PROCEDURE — 83735 ASSAY OF MAGNESIUM: CPT

## 2017-08-14 PROCEDURE — 85025 COMPLETE CBC W/AUTO DIFF WBC: CPT | Mod: 91

## 2017-08-14 PROCEDURE — A4216 STERILE WATER/SALINE, 10 ML: HCPCS | Performed by: THORACIC SURGERY (CARDIOTHORACIC VASCULAR SURGERY)

## 2017-08-14 PROCEDURE — 83615 LACTATE (LD) (LDH) ENZYME: CPT

## 2017-08-14 PROCEDURE — 99233 SBSQ HOSP IP/OBS HIGH 50: CPT | Mod: ,,, | Performed by: INTERNAL MEDICINE

## 2017-08-14 PROCEDURE — 99232 SBSQ HOSP IP/OBS MODERATE 35: CPT | Mod: GC,,, | Performed by: INTERNAL MEDICINE

## 2017-08-14 PROCEDURE — 84134 ASSAY OF PREALBUMIN: CPT

## 2017-08-14 PROCEDURE — 20000000 HC ICU ROOM

## 2017-08-14 PROCEDURE — 97110 THERAPEUTIC EXERCISES: CPT

## 2017-08-14 PROCEDURE — 97530 THERAPEUTIC ACTIVITIES: CPT

## 2017-08-14 PROCEDURE — 80053 COMPREHEN METABOLIC PANEL: CPT

## 2017-08-14 PROCEDURE — 97168 OT RE-EVAL EST PLAN CARE: CPT

## 2017-08-14 PROCEDURE — 84100 ASSAY OF PHOSPHORUS: CPT | Mod: 91

## 2017-08-14 PROCEDURE — 84100 ASSAY OF PHOSPHORUS: CPT

## 2017-08-14 PROCEDURE — 92610 EVALUATE SWALLOWING FUNCTION: CPT

## 2017-08-14 PROCEDURE — 85610 PROTHROMBIN TIME: CPT

## 2017-08-14 PROCEDURE — 99233 SBSQ HOSP IP/OBS HIGH 50: CPT | Mod: ,,, | Performed by: SURGERY

## 2017-08-14 RX ORDER — POLYETHYLENE GLYCOL 3350 17 G/17G
17 POWDER, FOR SOLUTION ORAL DAILY
Status: DISCONTINUED | OUTPATIENT
Start: 2017-08-14 | End: 2017-08-17

## 2017-08-14 RX ORDER — POTASSIUM CHLORIDE 7.45 MG/ML
10 INJECTION INTRAVENOUS ONCE
Status: COMPLETED | OUTPATIENT
Start: 2017-08-14 | End: 2017-08-14

## 2017-08-14 RX ORDER — HYDROCODONE BITARTRATE AND ACETAMINOPHEN 500; 5 MG/1; MG/1
TABLET ORAL
Status: DISCONTINUED | OUTPATIENT
Start: 2017-08-14 | End: 2017-08-16

## 2017-08-14 RX ORDER — WARFARIN 1 MG/1
1 TABLET ORAL ONCE
Status: COMPLETED | OUTPATIENT
Start: 2017-08-14 | End: 2017-08-14

## 2017-08-14 RX ADMIN — Medication 10 ML: at 05:08

## 2017-08-14 RX ADMIN — PANTOPRAZOLE SODIUM 40 MG: 40 INJECTION, POWDER, FOR SOLUTION INTRAVENOUS at 08:08

## 2017-08-14 RX ADMIN — Medication 10 ML: at 12:08

## 2017-08-14 RX ADMIN — Medication 10 ML: at 06:08

## 2017-08-14 RX ADMIN — POTASSIUM CHLORIDE 10 MEQ: 10 INJECTION, SOLUTION INTRAVENOUS at 05:08

## 2017-08-14 RX ADMIN — SODIUM PHOSPHATE, MONOBASIC, MONOHYDRATE 20.01 MMOL: 276; 142 INJECTION, SOLUTION INTRAVENOUS at 07:08

## 2017-08-14 RX ADMIN — DOPAMINE HYDROCHLORIDE IN DEXTROSE 5 MCG/KG/MIN: 1.6 INJECTION, SOLUTION INTRAVENOUS at 04:08

## 2017-08-14 RX ADMIN — DOPAMINE HYDROCHLORIDE IN DEXTROSE 5 MCG/KG/MIN: 1.6 INJECTION, SOLUTION INTRAVENOUS at 10:08

## 2017-08-14 RX ADMIN — Medication 10 ML: at 11:08

## 2017-08-14 RX ADMIN — ERTAPENEM SODIUM 1 G: 1 INJECTION, POWDER, LYOPHILIZED, FOR SOLUTION INTRAMUSCULAR; INTRAVENOUS at 07:08

## 2017-08-14 RX ADMIN — VASOPRESSIN 0.02 UNITS/MIN: 20 INJECTION INTRAVENOUS at 04:08

## 2017-08-14 RX ADMIN — EPINEPHRINE 0.04 MCG/KG/MIN: 1 INJECTION PARENTERAL at 04:08

## 2017-08-14 RX ADMIN — SOYBEAN OIL 250 ML: 20 INJECTION, SOLUTION INTRAVENOUS at 10:08

## 2017-08-14 RX ADMIN — CALCIUM GLUCONATE: 94 INJECTION, SOLUTION INTRAVENOUS at 11:08

## 2017-08-14 RX ADMIN — WARFARIN SODIUM 1 MG: 1 TABLET ORAL at 04:08

## 2017-08-14 NOTE — PROGRESS NOTES
Daily E and M and VAD Interrogation Note    Reason for Visit:  Patient is seen in follow up for management of:  [] HeartMate II  [] Heartware [] Total artificial heart       [] ECMO           [x] Other - HM III     Interval History:  [] Interval history unobtainable due to intubation.  The [x] implant/[] explant date was 7/27/17    Events - No acute events o/n. Extubated yesterday. Passing gas this AM. Working with speech this AM, voice is strong.         Review of Systems:   Negative except as above.      Medications:  Current Facility-Administered Medications   Medication Dose Route Frequency Provider Last Rate Last Dose    0.9%  NaCl infusion (for blood administration)   Intravenous Q24H PRN Shayne Espinoza MD        acetaminophen tablet 650 mg  650 mg Oral Q6H PRN Shayne Espinoza MD   650 mg at 08/08/17 2122    albumin human 5% bottle 500 mL  500 mL Intravenous PRN Shayne Espinoza MD   500 mL at 08/09/17 0700    albuterol nebulizer solution 2.5 mg  2.5 mg Nebulization Q4H PRN Shayne Espinoza MD        bisacodyl suppository 10 mg  10 mg Rectal Daily PRN Shayne Espinoza MD   10 mg at 08/06/17 2036    dextrose 50% injection 12.5 g  12.5 g Intravenous PRN Charbel Ritter MD        DOPamine 400 mg in dextrose 5 % 250 mL infusion (premix) (NON-TITRATING)  5 mcg/kg/min (Dosing Weight) Intravenous Continuous Shayne Espinoza MD 15 mL/hr at 08/14/17 1100 5 mcg/kg/min at 08/14/17 1100    EPINEPHrine (ADRENALIN) 4 mg in sodium chloride 0.9% 250 mL infusion  0.04 mcg/kg/min (Dosing Weight) Intravenous Continuous Shayne Espinoza MD 12 mL/hr at 08/14/17 1100 0.04 mcg/kg/min at 08/14/17 1100    ertapenem (INVANZ) 1 g in sodium chloride 0.9 % 100 mL IVPB (ready to mix system)  1 g Intravenous Q24H Edwige Clay  mL/hr at 08/1950 1 g at 08/1950    fat emulsion 20% infusion 250 mL  250 mL Intravenous Daily Mary Kumar MD        fentaNYL injection 50 mcg  50 mcg  Intravenous Q4H PRN Shayne Espinoza MD   50 mcg at 08/13/17 1310    glucagon (human recombinant) injection 1 mg  1 mg Intramuscular PRN Charbel Ritter MD        hydrALAZINE injection 10 mg  10 mg Intravenous Q6H PRN Shayne Espinoza MD   10 mg at 08/08/17 1509    insulin aspart pen 0-5 Units  0-5 Units Subcutaneous Q4H PRN Charbel Ritter MD   2 Units at 08/10/17 0751    omnipaque oral solution 15 mL  15 mL Oral PRN Julio Cesar Rees MD   15 mL at 08/12/17 1758    ondansetron injection 4 mg  4 mg Intravenous Q6H PRN Shayne Espinoza MD   4 mg at 08/08/17 2112    pantoprazole injection 40 mg  40 mg Intravenous BID Shayne Espinoza MD   40 mg at 08/14/17 0834    sodium chloride 0.9% flush 10 mL  10 mL Intravenous Q6H Sunny Downing MD   10 mL at 08/14/17 0612    And    sodium chloride 0.9% flush 10 mL  10 mL Intravenous PRN Sunny Downing MD   10 mL at 08/10/17 1151    TPN ADULT CENTRAL LINE CUSTOM   Intravenous Continuous Shayne Espinoza MD 50 mL/hr at 08/14/17 1100      TPN ADULT CENTRAL LINE CUSTOM   Intravenous Continuous Mary Kumar MD        vasopressin (PITRESSIN) 100 Units in dextrose 5 % 100 mL infusion  0.04 Units/min Intravenous Continuous Sunny Downing MD   Stopped at 08/14/17 0800     Physical Examination:  Vital Signs:   Vitals:    08/14/17 1115   BP:    Pulse: 79   Resp: (!) 27   Temp:      Cardiovascular:  [x] Regular rate and rhythm [] Irregular []  None (MATT) []  Other  []  No edema [x]  Edema present  [x]  Clear to auscultation  []  Rales to []  Coarse  []  No rales but   [] Pedal Pulses absent  [x]  Pulses  + throughout  Skin:  Incision is [x]  Clean, dry and intact.  []  Other   Sternum:  [x]  Stable []  Unstable  Driveline(s):   [x]  Clean, dry and intact. []  Other     Labs:  BMP  Lab Results   Component Value Date     08/14/2017     08/14/2017    K 3.8 08/14/2017    K 3.8 08/14/2017     08/14/2017     08/14/2017    CO2 24 08/14/2017     CO2 24 08/14/2017     (H) 08/14/2017     (H) 08/14/2017    CREATININE 2.1 (H) 08/14/2017    CREATININE 2.1 (H) 08/14/2017    CALCIUM 8.1 (L) 08/14/2017    CALCIUM 8.1 (L) 08/14/2017    ANIONGAP 11 08/14/2017    ANIONGAP 11 08/14/2017    ESTGFRAFRICA 36.6 (A) 08/14/2017    ESTGFRAFRICA 36.6 (A) 08/14/2017    EGFRNONAA 31.6 (A) 08/14/2017    EGFRNONAA 31.6 (A) 08/14/2017     Magnesium   Date Value Ref Range Status   08/14/2017 2.4 1.6 - 2.6 mg/dL Final     Lab Results   Component Value Date    WBC 12.53 08/14/2017    HGB 6.5 (L) 08/14/2017    HCT 18.7 (LL) 08/14/2017    MCV 79 (L) 08/14/2017     (L) 08/14/2017     Lab Results   Component Value Date    INR 4.1 (H) 08/14/2017    INR 5.7 (HH) 08/13/2017    INR 5.4 (HH) 08/12/2017     BNP   Date Value Ref Range Status   08/14/2017 2,873 (H) 0 - 99 pg/mL Final     Comment:     Values of less than 100 pg/ml are consistent with non-CHF populations.   08/11/2017 2,609 (H) 0 - 99 pg/mL Final     Comment:     Values of less than 100 pg/ml are consistent with non-CHF populations.   08/09/2017 1,232 (H) 0 - 99 pg/mL Final     Comment:     Values of less than 100 pg/ml are consistent with non-CHF populations.   08/09/2017 1,232 (H) 0 - 99 pg/mL Final     Comment:     Values of less than 100 pg/ml are consistent with non-CHF populations.     LD   Date Value Ref Range Status   08/14/2017 279 (H) 110 - 260 U/L Final     Comment:     Results are increased in hemolyzed samples.   08/13/2017 278 (H) 110 - 260 U/L Final     Comment:     Results are increased in hemolyzed samples.   08/12/2017 409 (H) 110 - 260 U/L Final     Comment:     Results are increased in hemolyzed samples.     X-Rays:  [x]  I reviewed today's Chest x-ray    Procedure:  Device Interrogation including analysis of device parameters.  Current Settings   [x]  Ventricular Assist Device  []  Total Artificial Heart interrogated  Review of device function is [x]  Stable []  Other   TXP LVAD  INTERROGATIONS 8/14/2017 8/14/2017 8/14/2017 8/14/2017 8/14/2017 8/14/2017 8/14/2017   Type HeartMate3 HeartMate3 HeartMate3 HeartMate3 HeartMate3 HeartMate3 HeartMate3   Flow 4 3.8 4 3.9 3.8 3.9 4.1   Speed 5100 5100 5100 5100 5100 5100 5100   PI 3.7 4.1 3.7 3.5 3.9 3.5 3.5   Power ([place]) 3.5 3.4 3.4 3.5 3.4 3.4 3.5   LSL 4700 4700 4700 - - - 4700   Pulsatility - - - - - - -   Some recent data might be hidden       Assessment:  [x]  Primary Cardiomyopathy []  Congestive Heart Failure   []  Atrial Fibrillation []  Ventricular Tachycardia   []  Aftercare cardiac device []  Long term (current) use of anticoagulants   []  Ventilator-associated pneumonia []  Pneumonia viral, unspecified   []  Pneumonia, bacterial, unspecified []  Pneumonia, organism unspecified   []  Hemorrhage of GI tract, unspecified    []  Nosebleed []  Driveline infection   []  Infection VAD device []  New onset of    []        Plan:  [x]  Interval history obtained from ICU attending team member during rounding today  []  VAD/MATT teaching performed with patient  []  Mobilization / Physical Therapy ongoing  []  Anticoagulation []  Ongoing []  Held  []  Studies ordered  []   To OR today for closure.       Total time spent was 30 minutes.  Of which more than 50 percent of the care dominated counseling and coordinating care with different team members. The VAD was interrogated and all parameters were WNL and no significant findings were found in the history. All these findings are documented in the note above.    Neuro:  - Alert and oriented  - Wean sedation as tolerated     Resp:  - Extubated  - Resp cultures with ESBL - Ertapenem started per ID   - Minimize supplemental O2  - Pulmonary toilet    CV:  - HDS; LVAD numbers stable  -   - Dobutamine off  - Dopamine, epi - wean epi as tolerated   - Vaso weaned off  - Hold  given GI bleed    Heme:  - Hgb/Hct 6.5/18.7 - transfuse 1 u PRBC over 4 hours   - Continue to trend  - INR 4.1 this AM    - Will restart coumadin at 1 mg tonight  - Heparin off    ID:  - afebrile  - Ertapenem started for ESBL pos resp culture  - continue to monitor     Renal:  - Singer in place  - Strict I/Os   - Adequate UOP  - Lasix gtt off  -  this AM - will likely need dialysis    FEN/GI:  - Strict NPO  - Restart bowel reg  - Protonix 40 BID  - Replace lytes PRN  - Ileus resolved   - Continue TPN    Endo:  - Endocrine following, appreciate assistance  - Accuchecks  - Insulin ssi    Dispo:  - Continue ICU care.       Shayne Espinoza MD  General Surgery PGY-2  Pager: [phone]    Cardiac Surgery Attending E and M (VAD) Note along with VAD Interrogation    I have seen and examined the patient and agree with the findings above    I also reviewed the patients clinical course and:  [x]  Hemodynamic & Respiratory paramters  [x]  Laboratory Data  [x]  Radiological studies     VAD Interrogated [x]      VAD Function is normal. Changes made []  None [x]        Interrogation of Ventricular assist device was performed with physician analysis of device parameters and review of device function. I have personally reviewed the interrogation findings and agree with findings as stated

## 2017-08-14 NOTE — ASSESSMENT & PLAN NOTE
- LVAD HM III. Placed this admit 7/27/17  - CTS Primary  - chest closure (7/28/17) and extubated (7/29/17)  -Reintubated 8/9/17 and extubated 8/13/17  -Now on Dopamine, Epi, and off Vaso this am  - Speed 5100  - LDH stable  - INR supratherpeutic. AC per CTS

## 2017-08-14 NOTE — PROCEDURES
"Patient asleep with wife at bedside. VAD interrogation completed this AM in the event changes needed to be made. Will continue to monitor for further issues.     EDUCATION: Pt wife reports she has started studying again.  She reports she "only has a few pages left in the workbook".  She also demonstrated she has been recording all of pt VAD numbers daily.  PT wife had multiple great questions about battery use in which we discussed for about 30 minutes.  Pt wife reports she will continue to work to complete in the next few days.  Pt did not participate in education and slept the duration of providing education to pt wife.     Pulsatile: Yes, intermittent   VAD Sounds: HM3  Smooth  Problems / Issues / Alarms with VAD if any: None noted, LSO noted 8/9/17.  Pt wife reports this "is when all the stuff happened last week when he was so sick".    HCT: 20   Modular Cable Connection Intact(no yellow exposed): NO     VAD Interrogation:  TXP LVAD INTERROGATIONS 8/14/2017 8/14/2017 8/14/2017 8/14/2017 8/14/2017 8/14/2017 8/14/2017   Type HeartMate3 HeartMate3 HeartMate3 HeartMate3 HeartMate3 HeartMate3 HeartMate3   Flow 4 4.1 4.1 4 4.1 4 3.8   Speed 5100 5100 5100 5100 5100 5100 5100   PI 3.7 3.6 3.7 3.7 3.4 3.7 4.1   Power ([place]) 3.5 3.4 3.4 3.5 3.4 3.5 3.4   LSL 4700 4700 4700 4700 4700 4700 4700   Pulsatility - - - - - - -   Some recent data might be hidden   }          "

## 2017-08-14 NOTE — PLAN OF CARE
Problem: SLP Goal  Goal: SLP Goal  Speech Language Pathology Goals  Goals expected to be met by 8/21  1. Pt will tolerate thin liquids with no overt s/s of aspiration.   2. Pt will tolerate trials of soft solids with adequate a-p transit and no overt s/s of aspiration      Outcome: Ongoing (interventions implemented as appropriate)  SLP Clinical Swallow Evaluation completed. Please see note for recommendations. SHERRI Lofton, CCC-SLP, [facility] 8/14/2017

## 2017-08-14 NOTE — ASSESSMENT & PLAN NOTE
66 yo M PMhx non-ischemic CM on home dobutamine, HTN, HLD, s/p AICD initially presented with syncopal episodes and AICD shock, s/p LVAD placement in 7/27,now found with E Coli bacteremia and ESBL E Coli from resp cx.    - continue ertapenem  - follow up with blood cx

## 2017-08-14 NOTE — PROGRESS NOTES
[facility]  Heart Transplant  Progress Note    Patient Name: Suman Hayden  MRN: [id number]  Admission Date: 7/18/2017  Hospital Length of Stay: 27 days  Attending Physician: Sunny Downing MD  Primary Care Provider: Joe Ernst MD  Principal Problem:Acute on chronic combined systolic and diastolic heart failure    Subjective:     Interval History:   Pt extubated yesterday. On Oz via NC this am. No complaints    Continuous Infusions:   DOPamine 5 mcg/kg/min (08/14/17 1100)    epinephrine infusion 0.03 mcg/kg/min (08/14/17 1125)    TPN ADULT CENTRAL LINE CUSTOM 50 mL/hr at 08/14/17 1100    TPN ADULT CENTRAL LINE CUSTOM      vasopressin (PITRESSIN) infusion Stopped (08/14/17 0800)     Scheduled Meds:   docusate sodium  50 mg Oral Daily    ertapenem (INVANZ) IVPB  1 g Intravenous Q24H    fat emulsion 20%  250 mL Intravenous Daily    pantoprazole  40 mg Intravenous BID    polyethylene glycol  17 g Oral Daily    sodium chloride 0.9%  10 mL Intravenous Q6H    warfarin  1 mg Oral Once     PRN Meds:sodium chloride, acetaminophen, albumin human 5%, albuterol sulfate, bisacodyl, dextrose 50%, fentaNYL, glucagon (human recombinant), hydrALAZINE, insulin aspart, omnipaque, ondansetron, Flushing PICC Protocol **AND** sodium chloride 0.9% **AND** sodium chloride 0.9%    Review of patient's allergies indicates:  No Known Allergies  Objective:     Vital Signs (Most Recent):  Temp: 98.6 °F (37 °C) (08/14/17 1100)  Pulse: 79 (08/14/17 1115)  Resp: (!) 27 (08/14/17 1115)  BP: (!) 74/0 (08/14/17 0700)  SpO2: 100 % (08/14/17 1115) Vital Signs (24h Range):  Temp:  [98.6 °F (37 °C)-99.4 °F (37.4 °C)] 98.6 °F (37 °C)  Pulse:  [79-80] 79  Resp:  [14-30] 27  SpO2:  [99 %-100 %] 100 %  BP: (70-80)/(0) 74/0  Arterial Line BP: ()/() 84/64     Weight: 85 kg (187 lb 6.3 oz)  Body mass index is 28.49 kg/m².      Intake/Output Summary (Last 24 hours) at 08/14/17 1249  Last data filed at 08/14/17 1200    Gross per 24 hour   Intake             2088 ml   Output             1715 ml   Net              373 ml       Hemodynamic Parameters:       Telemetry:     Physical Exam   Constitutional: He appears well-developed and well-nourished.   HENT:   Head: Normocephalic and atraumatic.   Eyes: EOM are normal. Pupils are equal, round, and reactive to light.   Cardiovascular: Normal rate, regular rhythm and normal heart sounds.  Exam reveals no gallop and no friction rub.    No murmur heard.  + LVAD hum    Pulmonary/Chest: Effort normal. No respiratory distress. He has no wheezes. He has no rales.   Abdominal: Soft. Bowel sounds are normal.   Musculoskeletal:   LUE Edema    Neurological: He is alert.   Nursing note and vitals reviewed.      Significant Labs:  CBC:    Recent Labs  Lab 08/14/17  0520   WBC 12.53   RBC 2.36*   HGB 6.5*   HCT 18.7*   *   MCV 79*   MCH 27.5   MCHC 34.8     BNP:    Recent Labs  Lab 08/14/17  0420   BNP 2,873*     CMP:    Recent Labs  Lab 08/14/17  0420   *  154*   CALCIUM 8.1*  8.1*   ALBUMIN 1.8*  1.8*   PROT 5.1*  5.1*     143   K 3.8  3.8   CO2 24  24     108   *  112*   CREATININE 2.1*  2.1*   ALKPHOS 151*  151*   ALT 70*  70*   *  101*   BILITOT 3.5*  3.5*      Coagulation:     Recent Labs  Lab 08/14/17  0420   INR 4.1*   APTT 43.7*     LDH:    Recent Labs  Lab 08/12/17  0300 08/13/17  0400 08/14/17  0420   * 278* 279*     Microbiology:  Microbiology Results (last 7 days)     Procedure Component Value Units Date/Time    Blood culture [[id number]] Collected:  08/09/17 0733    Order Status:  Completed Specimen:  Blood from Peripheral, Antecubital, Left Updated:  08/14/17 1022     Blood Culture, Routine No growth after 5 days.    Blood culture [[id number]] Collected:  08/11/17 1326    Order Status:  Completed Specimen:  Blood from Peripheral, Forearm, Left Updated:  08/13/17 1812     Blood Culture, Routine No Growth to date     Blood  Culture, Routine No Growth to date     Blood Culture, Routine No Growth to date    Blood culture [[id number]]  (Susceptibility) Collected:  08/09/17 0813    Order Status:  Completed Specimen:  Blood from Line, Arterial, Right Updated:  08/12/17 1113     Blood Culture, Routine Gram stain clement bottle: Gram negative rods      Blood Culture, Routine Results called to and read back by: Emma York RN 08/10/2017  11:13     Blood Culture, Routine ESCHERICHIA COLI ESBL    Blood culture [[id number]] Collected:  08/11/17 1326    Order Status:  Sent Specimen:  Blood from Peripheral, Forearm, Left Updated:  08/11/17 1327    Culture, Respiratory with Gram Stain [[id number]]  (Susceptibility) Collected:  08/09/17 0723    Order Status:  Completed Specimen:  Respiratory from Endotracheal Aspirate Updated:  08/11/17 1153     Respiratory Culture --     ESCHERICHIA COLI ESBL  Many       Gram Stain (Respiratory) <10 epithelial cells per low power field.     Gram Stain (Respiratory) Few WBC's     Gram Stain (Respiratory) Many Gram negative rods     Gram Stain (Respiratory) Few Gram positive rods     Gram Stain (Respiratory) Few Gram positive cocci    Urine culture [[id number]] Collected:  08/09/17 0725    Order Status:  Completed Specimen:  Urine from Urine, Catheterized Updated:  08/10/17 1128     Urine Culture, Routine No growth    Blood culture [[id number]] Collected:  08/04/17 1653    Order Status:  Completed Specimen:  Blood from Peripheral, Wrist, Right Updated:  08/09/17 2012     Blood Culture, Routine No growth after 5 days.    Blood culture [[id number]] Collected:  08/04/17 1654    Order Status:  Completed Specimen:  Blood from Peripheral, Wrist, Left Updated:  08/09/17 2012     Blood Culture, Routine No growth after 5 days.          I have reviewed all pertinent labs within the past 24 hours.    Estimated Creatinine Clearance: 36.2 mL/min (based on Cr of 2.1).    Diagnostic Results:  I have reviewed and interpreted all pertinent  imaging results/findings within the past 24 hours.    Assessment and Plan:     LVAD (left ventricular assist device) present    - LVAD HM III. Placed this admit 7/27/17  - CTS Primary  - chest closure (7/28/17) and extubated (7/29/17)  -Reintubated 8/9/17 and extubated 8/13/17  -Now on Dopamine, Epi, and off Vaso this am  - Speed 5100  - LDH stable  - INR supratherpeutic. AC per CTS                    Bacteremia    -ESBL from blood culture 8/9   -Subsequent cultures NGTD   -Ertapenem ( MERRITT < 0.5). ID following        Atrial fibrillation    -AC, Amio per C TS          Atrial tachycardia    - [id number] - 3  -Rec restarting Amio. AC per CTS        AICD discharge    - appropriate in the setting of VT aggravated by underlying AT/AFL (earlier during the admission)  - 7/28 - setting of AF undersense - device reprogrammed to VVI 80  - tachy therapy turned off  - Rec restarting Amio  - EP planning to do lead revision         Hepatitis B core antibody positive since 2012    - will defer liver biopsy as CTS not requiring it  - ID consult cleared the patient for sx  - case discussed with hepatology, low suspicion for liver involvement        V-tach    - Rec restarting Amio        Hyperlipidemia    - Rec resuming pravastatin            Susannah Elizabeth PA-C  Heart Transplant  [facility]

## 2017-08-14 NOTE — SUBJECTIVE & OBJECTIVE
Interval History: Patient moderately responsive to questioning this am, extubated yesterday, denies any respiratory distress on nasal cannula this am.     Review of patient's allergies indicates:  No Known Allergies  Current Facility-Administered Medications   Medication Frequency    0.9%  NaCl infusion (for blood administration) Q24H PRN    acetaminophen tablet 650 mg Q6H PRN    albumin human 5% bottle 500 mL PRN    albuterol nebulizer solution 2.5 mg Q4H PRN    bisacodyl suppository 10 mg Daily PRN    dextrose 50% injection 12.5 g PRN    docusate sodium capsule 50 mg Daily    DOPamine 400 mg in dextrose 5 % 250 mL infusion (premix) (NON-TITRATING) Continuous    EPINEPHrine (ADRENALIN) 4 mg in sodium chloride 0.9% 250 mL infusion Continuous    ertapenem (INVANZ) 1 g in sodium chloride 0.9 % 100 mL IVPB (ready to mix system) Q24H    fat emulsion 20% infusion 250 mL Daily    fentaNYL injection 50 mcg Q4H PRN    glucagon (human recombinant) injection 1 mg PRN    hydrALAZINE injection 10 mg Q6H PRN    insulin aspart pen 0-5 Units Q4H PRN    omnipaque oral solution 15 mL PRN    ondansetron injection 4 mg Q6H PRN    pantoprazole injection 40 mg BID    polyethylene glycol packet 17 g Daily    sodium chloride 0.9% flush 10 mL Q6H    And    sodium chloride 0.9% flush 10 mL PRN    TPN ADULT CENTRAL LINE CUSTOM Continuous    TPN ADULT CENTRAL LINE CUSTOM Continuous    vasopressin (PITRESSIN) 100 Units in dextrose 5 % 100 mL infusion Continuous    warfarin (COUMADIN) tablet 1 mg Once       Objective:     Vital Signs (Most Recent):  Temp: 98.6 °F (37 °C) (08/14/17 1100)  Pulse: 80 (08/14/17 1400)  Resp: (!) 23 (08/14/17 1400)  BP: (!) 74/0 (08/14/17 0700)  SpO2: 100 % (08/14/17 1400)  O2 Device (Oxygen Therapy): nasal cannula (08/14/17 1100) Vital Signs (24h Range):  Temp:  [98.6 °F (37 °C)-99.4 °F (37.4 °C)] 98.6 °F (37 °C)  Pulse:  [79-80] 80  Resp:  [14-33] 23  SpO2:  [99 %-100 %] 100 %  BP:  (70-80)/(0) 74/0  Arterial Line BP: ()/() 83/64     Weight: 85 kg (187 lb 6.3 oz) (08/14/17 0500)  Body mass index is 28.49 kg/m².  Body surface area is 2.02 meters squared.    I/O last 3 completed shifts:  In: 3414 [I.V.:1495; NG/GT:120]  Out: 2905 [Urine:2455; Drains:450]    Physical Exam   Constitutional: He appears well-developed.   Cardiovascular: Exam reveals no gallop and no friction rub.    No murmur heard.  Mechanical device sounds   Pulmonary/Chest: Effort normal and breath sounds normal. No respiratory distress. He has no wheezes. He has no rales.   Abdominal: Soft. There is no tenderness.   Musculoskeletal: He exhibits no edema.   Neurological: He is alert.   Skin: Skin is warm and dry. No rash noted.   Vitals reviewed.      Significant Labs:   Reviewed    Significant Imaging:  Reviewed

## 2017-08-14 NOTE — PT/OT/SLP RE-EVAL
"Occupational Therapy  Re-evaluation    Suman Hayden   MRN: [id number]   Admitting Diagnosis: Acute on chronic combined systolic and diastolic heart failure    OT Date of Treatment: 08/14/17   OT Start Time: 1330  OT Stop Time: 1355  OT Total Time (min): 25 min    Billable Minutes:  Re-eval 15  THer act 10    Diagnosis: Acute on chronic combined systolic and diastolic heart failure   Pt is s/p LVAD HM III 7/27/17, chest closed 7/28/17 and returned to ICU and re-intubated. New orders in place for continued OT.        Past Medical History:   Diagnosis Date    Cardiomyopathy     Hyperlipidemia     Hypertension     Obesity     S/P implantation of automatic cardioverter/defibrillator (AICD)     Ventricular tachycardia       Past Surgical History:   Procedure Laterality Date    CARDIAC CATHETERIZATION      CARDIAC DEFIBRILLATOR PLACEMENT           General Precautions: Standard, aspiration, fall, LVAD, NPO, sternal  Orthopedic Precautions: N/A      Patient History:  Living Environment  Lives With: spouse  Living Arrangements: house  Transportation Available: family or friend will provide  Living Environment Comment: Pt lives with spouse in a SS home, No steps to enter, No DME, Tub/ shower combo. 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    Subjective:  Communicated with nsg prior to session.  "I'm tired" pt states.       Pain/Comfort  Pain Rating 1: 0/10    Objective:    Nsg deferred session initially this AM due to low H&H and receiving blood. Nsg asked for therapy to return in " "afternoon and was cleared for activity as tolerated upon OT return.     Cognitive Exam:  Pt lethargic but able to waken for brief periods. Pt following basic simple commands.      Physical Exam:  Pt is right hand dominant and demo 2+/5 UE strength within sternal precautions. Pt with intact sensation and limited coordination noted related to decreased strength. Pt with moderate edema B UE with left greater than right    Functional Mobility:  Bed Mobility:  Supine to Sit: Maximum Assistance  Sit to Supine: Maximum Assistance      Activities of Daily Living:       UE Dressing Level of Assistance: Total assistance  LE Dressing Level of Assistance: Total assistance  Grooming Position: Seated  Grooming Level of Assistance: Minimum assistance     Pt tolerated sitting EOB approx 8 min. OT monitored vital signs which remained stable. Pt with declining postural control and increased lethargy as sitting EOB continued; thus, pt returned supine.   OT provided education to pt/spouse re: OT POC. Education also provided re: AA/PROM and positioning of UE's. Spouse verb understanding and reports she has being completing ROM and positioning as stated.     AM-PAC 6 CLICK ADL  How much help from another person does this patient currently need?  1 = Unable, Total/Dependent Assistance  2 = A lot, Maximum/Moderate Assistance  3 = A little, Minimum/Contact Guard/Supervision  4 = None, Modified Rooks/Independent    Putting on and taking off regular lower body clothing? : 1  Bathing (including washing, rinsing, drying)?: 1  Toileting, which includes using toilet, bedpan, or urinal? : 1  Putting on and taking off regular upper body clothing?: 1  Taking care of personal grooming such as brushing teeth?: 3  Eating meals?: 1  Total Score: 8    AM-PAC Raw Score CMS "G-Code Modifier Level of Impairment Assistance   6 % Total / Unable   7 - 9 CM 80 - 100% Maximal Assist   10 - 14 CL 60 - 80% Moderate Assist   15 - 19 CK 40 - 60% " Moderate Assist   20 - 22 CJ 20 - 40% Minimal Assist   23 CI 1-20% SBA / CGA   24 CH 0% Independent/ Mod I       Patient left supine with all lines intact, call button in reach and spouse present    Assessment:  Suman Hayden is a 67 y.o. male with a medical diagnosis of Acute on chronic combined systolic and diastolic heart failure and s/p LVAD placement. Pt tolerated session fair limited by fatigue and unable to remain engaged with therapy this afternoon. Pt to benefit from cont OT to address stated goals. Initial goals remain appropriate upon re-eval this date. Pt to benefit from cont OT to address stated goals.     Rehab identified problem list/impairments: Rehab identified problem list/impairments: impaired functional mobilty, gait instability, impaired endurance, weakness, impaired balance, impaired self care skills    Rehab potential is good.    Activity tolerance: Fair    Discharge recommendations: Discharge Facility/Level Of Care Needs: home with home health     Barriers to discharge:      Equipment recommendations:       GOALS:    Occupational Therapy Goals        Problem: Occupational Therapy Goal    Goal Priority Disciplines Outcome Interventions   Occupational Therapy Goal     OT, PT/OT Ongoing (interventions implemented as appropriate)    Description:  Goals to be met by:  2 weeks 8/28/17    Patient will increase functional independence with ADLs by performing:  Feeding: Independent   UE Dressing with Supervision.  LE Dressing with Supervision.  Grooming while standing with Supervision.  Toileting from toilet with Supervision for hygiene and clothing management.   Stand pivot transfers with Supervision.  Toilet transfer to toilet with Supervision.  Pt will be supervision  with LVAD yasmin't.                  Multidisciplinary Problems (Resolved)        Problem: Occupational Therapy Goal    Goal Priority Disciplines Outcome Interventions   Occupational Therapy Goal   (Resolved)     OT, PT/OT   Error    Description:  Goals to be met by: 8/04/2017    Patient will increase functional independence with ADLs by performing:    Increased functional strength to 5/5 for improved ADL performance.  Upper extremity exercise program and R LE ankle pumps  3x 10 reps per handout, with independence.                      PLAN:  Patient to be seen 6 x/week to address the above listed problems via self-care/home management, therapeutic activities, therapeutic exercises  Plan of Care expires: 08/29/17  Plan of Care reviewed with: patient, spouse         DELMY Navarro  08/14/2017

## 2017-08-14 NOTE — PROGRESS NOTES
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 Will is a 66 yo AAM with PMHx of NICM, HTN, HLD, and CKD III who presented to the hospital on 7/18 after syncopal episode at home.  He underwent LVAD placement on 7/27.  Labs that day showed a spike in his SCr to 1.5, but he quickly returned to baseline (1.2-1.3) and stayed stable until 08/01 when he increased to 1.5 and has steadily remained above baseline since, up to 2.3 on consult.  He has remained on lasix infusion with intermittent dosages of diuril to aid in diuresis.  On 08/31, there was a reported decrease in LVAD flows which resolved with decrease in lasix infusion and administration of 1 unit of PRBCs, likely causing decrease renal perfusion leading to an ischemic event.  He continues with adequate UOP on lasix gtt. He was transferred to the floor from ICU on 08/08 and was noted to become hypotensive with a doppler pressure of 58, since been requiring pressors for BP support.  Since ICU readmission he has been having increase OG tube drainage averaging around 1.5L/24h for last 2 days.  CVP recorded at 9.  Nephrology was consulted for INDIRA and decreased UOP.    Interval History: Patient moderately responsive to questioning this am, denies any respiratory distress.     Review of patient's allergies indicates:  No Known Allergies  Current Facility-Administered Medications   Medication Frequency    0.9%  NaCl infusion (for blood administration) Q24H PRN    acetaminophen tablet 650 mg Q6H PRN    albumin human 5% bottle 500 mL PRN    albuterol nebulizer solution 2.5 mg Q4H PRN    bisacodyl suppository 10 mg Daily PRN    dextrose 50% injection 12.5 g PRN     docusate sodium capsule 50 mg Daily    DOPamine 400 mg in dextrose 5 % 250 mL infusion (premix) (NON-TITRATING) Continuous    EPINEPHrine (ADRENALIN) 4 mg in sodium chloride 0.9% 250 mL infusion Continuous    ertapenem (INVANZ) 1 g in sodium chloride 0.9 % 100 mL IVPB (ready to mix system) Q24H    fat emulsion 20% infusion 250 mL Daily    fentaNYL injection 50 mcg Q4H PRN    glucagon (human recombinant) injection 1 mg PRN    hydrALAZINE injection 10 mg Q6H PRN    insulin aspart pen 0-5 Units Q4H PRN    omnipaque oral solution 15 mL PRN    ondansetron injection 4 mg Q6H PRN    pantoprazole injection 40 mg BID    polyethylene glycol packet 17 g Daily    sodium chloride 0.9% flush 10 mL Q6H    And    sodium chloride 0.9% flush 10 mL PRN    TPN ADULT CENTRAL LINE CUSTOM Continuous    TPN ADULT CENTRAL LINE CUSTOM Continuous    vasopressin (PITRESSIN) 100 Units in dextrose 5 % 100 mL infusion Continuous    warfarin (COUMADIN) tablet 1 mg Once       Objective:     Vital Signs (Most Recent):  Temp: 98.6 °F (37 °C) (08/14/17 1100)  Pulse: 80 (08/14/17 1400)  Resp: (!) 23 (08/14/17 1400)  BP: (!) 74/0 (08/14/17 0700)  SpO2: 100 % (08/14/17 1400)  O2 Device (Oxygen Therapy): nasal cannula (08/14/17 1100) Vital Signs (24h Range):  Temp:  [98.6 °F (37 °C)-99.4 °F (37.4 °C)] 98.6 °F (37 °C)  Pulse:  [79-80] 80  Resp:  [14-33] 23  SpO2:  [99 %-100 %] 100 %  BP: (70-80)/(0) 74/0  Arterial Line BP: ()/() 83/64     Weight: 85 kg (187 lb 6.3 oz) (08/14/17 0500)  Body mass index is 28.49 kg/m².  Body surface area is 2.02 meters squared.    I/O last 3 completed shifts:  In: 3414 [I.V.:1495; NG/GT:120]  Out: 2905 [Urine:2455; Drains:450]    Physical Exam   Constitutional: He appears well-developed.   Cardiovascular: Exam reveals no gallop and no friction rub.    No murmur heard.  Mechanical device sounds   Pulmonary/Chest: Effort normal and breath sounds normal. No respiratory distress. He has no  wheezes. He has no rales.   Abdominal: Soft. There is no tenderness.   Musculoskeletal: He exhibits no edema.   Neurological: He is alert.   Skin: Skin is warm and dry. No rash noted.   Vitals reviewed.      Significant Labs:   Reviewed    Significant Imaging:  Reviewed    Assessment/Plan:     Acute renal failure with acute tubular necrosis superimposed on stage 3 chronic kidney disease    Patient with nonoliguric INDIRA, likely ischemic (v. septic ATN) from renal hypoperfusion given cardiomyopathy.  Patient creatinine has remained stable, has maintained relative balance in I/O's, due to not suspect elevated BUN indicative of uremia (elevation likely secondary to TPN administration and possible blood loss, agree with PRBC).  No urgent indication for renal replacement therapy at this time, will continue to monitor closely.                  David Sal MD  Nephrology  [facility]

## 2017-08-14 NOTE — PT/OT/SLP EVAL
Speech Language Pathology  Evaluation    Suman Hayden   MRN: [id number]   6086/[id number] A     Admitting Diagnosis: Acute on chronic combined systolic and diastolic heart failure    Diet recommendations:    Liquid Diet Level: Thin, Clears   ·  Feed only when awake/alert,   · No straws,   · HOB to 90 degrees,   · 1 bite/sip at a time   · Meds crushed in puree  · Ongoing assessment with SLP       SLP Treatment Date: 08/14/17  Speech Start Time: 1000     Speech Stop Time: 1023     Speech Total (min): 23 min       TREATMENT BILLABLE MINUTES:  Eval Swallow and Oral Function 23    Diagnosis: Acute on chronic combined systolic and diastolic heart failure  Intubation history:  07/27 - 07/29 08/09 - 08/13    Past Medical History:   Diagnosis Date    Cardiomyopathy     Hyperlipidemia     Hypertension     Obesity     S/P implantation of automatic cardioverter/defibrillator (AICD)     Ventricular tachycardia      Past Surgical History:   Procedure Laterality Date    CARDIAC CATHETERIZATION      CARDIAC DEFIBRILLATOR PLACEMENT         Has the patient been evaluated by SLP for swallowing? : Yes  Keep patient NPO?: No   General Precautions: Standard, aspiration, fall, LVAD, NPO, sternal    Current Respiratory Status: nasal cannula     Subjective:  Pt cooperative although lethargic     Pain/Comfort  Pain Rating 1: 0/10    Objective:   Patient found with: blood pressure cuff, pulse ox (continuous), peripheral IV, telemetry, oxygen (lvad)    Oral Musculature Evaluation  Oral Musculature: general weakness  Dentition: edentulous  Mucosal Quality: dry  Mandibular Strength and Mobility: WFL  Oral Labial Strength and Mobility: WFL  Lingual Strength and Mobility: WFL  Velar Elevation: WFL  Buccal Strength and Mobility: WFL  Volitional Cough: adequate in strength  Volitional Swallow: present  Voice Prior to PO Intake: clear, dry     Bedside Swallow Eval:  Consistencies Assessed:   · Thin liquids - via ice chip, tsp x1, clinician assisted  open cup sips in isolation and as liquid wash  · Puree - applesauce via tsp x3  Oral Phase: WFL  Pharyngeal Phase: Multiple swallow per bolus with puree     Additional Treatment:    Pt refused solid trials. Due to multiple swallows per puree bolus with concern for pharyngeal residue and recent history of prolonged intubation, recommend clear liquid diet. Will follow up tomorrow. White board updated to refect SLP recommendations.     Assessment:  Suman Hayden is a 67 y.o. male with a medical diagnosis of Acute on chronic combined systolic and diastolic heart failure and presents with no overt s/s of aspiration.    Do you have any cultural, spiritual, Roman Catholic conflicts, given your current situation?: none known     Discharge recommendations: Discharge Facility/Level Of Care Needs: other (see comments) (pending progress)     Goals:    SLP Goals        Problem: SLP Goal    Goal Priority Disciplines Outcome   SLP Goal     SLP Ongoing (interventions implemented as appropriate)   Description:  Speech Language Pathology Goals  Goals expected to be met by 8/21  1. Pt will tolerate thin liquids with no overt s/s of aspiration.   2. Pt will tolerate trials of soft solids with adequate a-p transit and no overt s/s of aspiration                  Multidisciplinary Problems (Resolved)        Problem: SLP Goal    Goal Priority Disciplines Outcome   SLP Goal   (Resolved)     SLP Outcome(s) achieved   Description:  Speech Language Pathology Goals  Goals expected to be met by 8/4:  1. Pt will tolerate a dental soft diet and thin liquids without s/s of aspiration.                          Plan:   Patient to be seen Therapy Frequency: 5 x/week   Plan of Care expires: 09/12/17  Plan of Care reviewed with: spouse, patient  SLP Follow-up?: Yes       SHERRI Hart, CCC-SLP, CLC  Speech Language Pathologist  Certified Lactation Counselor  [phone]  8/14/2017

## 2017-08-14 NOTE — SUBJECTIVE & OBJECTIVE
Interval History:   Patient extubated over night. Patient was tachycardic this morning, however, afebrile. A drop in H & H 6.5 and 18.7 from previous lab. Patient is responsive but tired this morning. Otherwise no acute events over night.     Review of Systems   Unable to perform ROS: Other     Objective:     Vital Signs (Most Recent):  Temp: 98.6 °F (37 °C) (08/14/17 1100)  Pulse: 80 (08/14/17 1400)  Resp: (!) 23 (08/14/17 1400)  BP: (!) 74/0 (08/14/17 0700)  SpO2: 100 % (08/14/17 1400) Vital Signs (24h Range):  Temp:  [98.6 °F (37 °C)-99.4 °F (37.4 °C)] 98.6 °F (37 °C)  Pulse:  [79-80] 80  Resp:  [14-33] 23  SpO2:  [99 %-100 %] 100 %  BP: (70-80)/(0) 74/0  Arterial Line BP: ()/() 83/64     Weight: 85 kg (187 lb 6.3 oz)  Body mass index is 28.49 kg/m².    Estimated Creatinine Clearance: 36.2 mL/min (based on Cr of 2.1).    Physical Exam   Constitutional: He appears well-developed and well-nourished.   HENT:   Head: Normocephalic and atraumatic.   Eyes: Conjunctivae are normal. No scleral icterus.   Cardiovascular: Intact distal pulses.    No murmur heard.  Pulmonary/Chest: He has no wheezes. He has rales.   ET tube in place   Abdominal: He exhibits distension. There is no tenderness. There is no rebound and no guarding.   Musculoskeletal: He exhibits edema.   L upper ext edema > R   Lymphadenopathy:     He has no cervical adenopathy.   Neurological: He is alert.   Skin: Skin is warm.       Significant Labs:   Blood Culture:   Recent Labs  Lab 08/04/17  1653 08/04/17  1654 08/09/17  0733 08/09/17  0813 08/11/17  1326   LABBLOO No growth after 5 days. No growth after 5 days. No growth after 5 days. Gram stain clement bottle: Gram negative rods   Results called to and read back by: Emma York RN 08/10/2017  11:13  ESCHERICHIA COLI ESBL No Growth to date  No Growth to date  No Growth to date     CBC:   Recent Labs  Lab 08/14/17  0420 08/14/17  0520 08/14/17  1157   WBC 12.91*  12.91* 12.53 13.06*    HGB 6.8*  6.8* 6.5* 7.5*   HCT 19.9*  19.9* 18.7* 21.7*   *  120* 115* 129*     CMP:   Recent Labs  Lab 08/14/17  0050 08/14/17  0420 08/14/17  1157    143  143 145   K 3.7 3.8  3.8 3.9    108  108 109   CO2 22* 24  24 27   * 154*  154* 123*   * 112*  112* 119*   CREATININE 2.2* 2.1*  2.1* 2.1*   CALCIUM 7.9* 8.1*  8.1* 8.2*   PROT 5.1* 5.1*  5.1* 5.1*   ALBUMIN 1.8* 1.8*  1.8* 1.8*   BILITOT 3.3* 3.5*  3.5* 3.7*   ALKPHOS 152* 151*  151* 157*   AST 97* 101*  101* 113*   ALT 66* 70*  70* 77*   ANIONGAP 13 11  11 9   EGFRNONAA 29.9* 31.6*  31.6* 31.6*     Coagulation:   Recent Labs  Lab 08/14/17  0420   INR 4.1*   APTT 43.7*     Lactic Acid: No results for input(s): LACTATE in the last 48 hours.  Lipase: No results for input(s): LIPASE in the last 48 hours.  Prealbumin:   Recent Labs  Lab 08/14/17  0420   PREALBUMIN 6*     Procalcitonin: No results for input(s): PROCAL in the last 48 hours.  Respiratory Culture:   Recent Labs  Lab 08/09/17  0723   GSRESP <10 epithelial cells per low power field.  Few WBC's  Many Gram negative rods  Few Gram positive rods  Few Gram positive cocci   RESPIRATORYC ESCHERICHIA COLI ESBLMany     Urine Culture:   Recent Labs  Lab 07/24/17  1139 08/04/17  1655 08/09/17  0725   LABURIN ENTEROCOCCUS FAECALIS>100,000 cfu/mlNo other significant isolate No growth No growth       Significant Imaging:

## 2017-08-14 NOTE — ASSESSMENT & PLAN NOTE
Patient with nonoliguric INDIAR, likely ischemic (v. septic ATN) from renal hypoperfusion given cardiomyopathy.  Patient creatinine has remained stable relatively balance in I/O's, due to not suspect elevated BUN indicative of uremia (elevation likely secondary to TPN administration and possible blood loss, agree with PRBC).  No urgent indication for renal replacement therapy at this time, will continue to monitor closely.

## 2017-08-14 NOTE — PLAN OF CARE
Problem: Occupational Therapy Goal  Goal: Occupational Therapy Goal  Goals to be met by:  2 weeks 8/28/17    Patient will increase functional independence with ADLs by performing:  Feeding: Independent   UE Dressing with Supervision.  LE Dressing with Supervision.  Grooming while standing with Supervision.  Toileting from toilet with Supervision for hygiene and clothing management.   Stand pivot transfers with Supervision.  Toilet transfer to toilet with Supervision.  Pt will be supervision  with LVAD yasmin't.      Goals and POC established and remain appropriate.

## 2017-08-14 NOTE — SUBJECTIVE & OBJECTIVE
Interval History:   Pt extubated yesterday. On Oz via NC this am. No complaints    Continuous Infusions:   DOPamine 5 mcg/kg/min (08/14/17 1100)    epinephrine infusion 0.03 mcg/kg/min (08/14/17 1125)    TPN ADULT CENTRAL LINE CUSTOM 50 mL/hr at 08/14/17 1100    TPN ADULT CENTRAL LINE CUSTOM      vasopressin (PITRESSIN) infusion Stopped (08/14/17 0800)     Scheduled Meds:   docusate sodium  50 mg Oral Daily    ertapenem (INVANZ) IVPB  1 g Intravenous Q24H    fat emulsion 20%  250 mL Intravenous Daily    pantoprazole  40 mg Intravenous BID    polyethylene glycol  17 g Oral Daily    sodium chloride 0.9%  10 mL Intravenous Q6H    warfarin  1 mg Oral Once     PRN Meds:sodium chloride, acetaminophen, albumin human 5%, albuterol sulfate, bisacodyl, dextrose 50%, fentaNYL, glucagon (human recombinant), hydrALAZINE, insulin aspart, omnipaque, ondansetron, Flushing PICC Protocol **AND** sodium chloride 0.9% **AND** sodium chloride 0.9%    Review of patient's allergies indicates:  No Known Allergies  Objective:     Vital Signs (Most Recent):  Temp: 98.6 °F (37 °C) (08/14/17 1100)  Pulse: 79 (08/14/17 1115)  Resp: (!) 27 (08/14/17 1115)  BP: (!) 74/0 (08/14/17 0700)  SpO2: 100 % (08/14/17 1115) Vital Signs (24h Range):  Temp:  [98.6 °F (37 °C)-99.4 °F (37.4 °C)] 98.6 °F (37 °C)  Pulse:  [79-80] 79  Resp:  [14-30] 27  SpO2:  [99 %-100 %] 100 %  BP: (70-80)/(0) 74/0  Arterial Line BP: ()/() 84/64     Weight: 85 kg (187 lb 6.3 oz)  Body mass index is 28.49 kg/m².      Intake/Output Summary (Last 24 hours) at 08/14/17 1249  Last data filed at 08/14/17 1200   Gross per 24 hour   Intake             2088 ml   Output             1715 ml   Net              373 ml       Hemodynamic Parameters:       Telemetry:     Physical Exam   Constitutional: He appears well-developed and well-nourished.   HENT:   Head: Normocephalic and atraumatic.   Eyes: EOM are normal. Pupils are equal, round, and reactive to light.    Cardiovascular: Normal rate, regular rhythm and normal heart sounds.  Exam reveals no gallop and no friction rub.    No murmur heard.  + LVAD hum    Pulmonary/Chest: Effort normal. No respiratory distress. He has no wheezes. He has no rales.   Abdominal: Soft. Bowel sounds are normal.   Musculoskeletal:   LUE Edema    Neurological: He is alert.   Nursing note and vitals reviewed.      Significant Labs:  CBC:    Recent Labs  Lab 08/14/17  0520   WBC 12.53   RBC 2.36*   HGB 6.5*   HCT 18.7*   *   MCV 79*   MCH 27.5   MCHC 34.8     BNP:    Recent Labs  Lab 08/14/17  0420   BNP 2,873*     CMP:    Recent Labs  Lab 08/14/17  0420   *  154*   CALCIUM 8.1*  8.1*   ALBUMIN 1.8*  1.8*   PROT 5.1*  5.1*     143   K 3.8  3.8   CO2 24  24     108   *  112*   CREATININE 2.1*  2.1*   ALKPHOS 151*  151*   ALT 70*  70*   *  101*   BILITOT 3.5*  3.5*      Coagulation:     Recent Labs  Lab 08/14/17  0420   INR 4.1*   APTT 43.7*     LDH:    Recent Labs  Lab 08/12/17  0300 08/13/17  0400 08/14/17  0420   * 278* 279*     Microbiology:  Microbiology Results (last 7 days)     Procedure Component Value Units Date/Time    Blood culture [[id number]] Collected:  08/09/17 0733    Order Status:  Completed Specimen:  Blood from Peripheral, Antecubital, Left Updated:  08/14/17 1022     Blood Culture, Routine No growth after 5 days.    Blood culture [[id number]] Collected:  08/11/17 1326    Order Status:  Completed Specimen:  Blood from Peripheral, Forearm, Left Updated:  08/13/17 1812     Blood Culture, Routine No Growth to date     Blood Culture, Routine No Growth to date     Blood Culture, Routine No Growth to date    Blood culture [[id number]]  (Susceptibility) Collected:  08/09/17 0813    Order Status:  Completed Specimen:  Blood from Line, Arterial, Right Updated:  08/12/17 1113     Blood Culture, Routine Gram stain clement bottle: Gram negative rods      Blood Culture, Routine Results  called to and read back by: Emma York RN 08/10/2017  11:13     Blood Culture, Routine ESCHERICHIA COLI ESBL    Blood culture [[id number]] Collected:  08/11/17 1326    Order Status:  Sent Specimen:  Blood from Peripheral, Forearm, Left Updated:  08/11/17 1327    Culture, Respiratory with Gram Stain [[id number]]  (Susceptibility) Collected:  08/09/17 0723    Order Status:  Completed Specimen:  Respiratory from Endotracheal Aspirate Updated:  08/11/17 1153     Respiratory Culture --     ESCHERICHIA COLI ESBL  Many       Gram Stain (Respiratory) <10 epithelial cells per low power field.     Gram Stain (Respiratory) Few WBC's     Gram Stain (Respiratory) Many Gram negative rods     Gram Stain (Respiratory) Few Gram positive rods     Gram Stain (Respiratory) Few Gram positive cocci    Urine culture [[id number]] Collected:  08/09/17 0725    Order Status:  Completed Specimen:  Urine from Urine, Catheterized Updated:  08/10/17 1128     Urine Culture, Routine No growth    Blood culture [[id number]] Collected:  08/04/17 1653    Order Status:  Completed Specimen:  Blood from Peripheral, Wrist, Right Updated:  08/09/17 2012     Blood Culture, Routine No growth after 5 days.    Blood culture [[id number]] Collected:  08/04/17 1654    Order Status:  Completed Specimen:  Blood from Peripheral, Wrist, Left Updated:  08/09/17 2012     Blood Culture, Routine No growth after 5 days.          I have reviewed all pertinent labs within the past 24 hours.    Estimated Creatinine Clearance: 36.2 mL/min (based on Cr of 2.1).    Diagnostic Results:  I have reviewed and interpreted all pertinent imaging results/findings within the past 24 hours.

## 2017-08-14 NOTE — PT/OT/SLP PROGRESS
Physical Therapy  Treatment    Suman Hayden   MRN: [id number]   Admitting Diagnosis: Acute on chronic combined systolic and diastolic heart failure    PT Received On: 08/14/17  PT Start Time: 1330     PT Stop Time: 1350    PT Total Time (min): 20 min       Billable Minutes:  Therapeutic Activity 15    Treatment Type: Treatment  PT/PTA: PT     PTA Visit Number: 0       General Precautions: Standard, fall, LVAD, sternal    Do you have any cultural, spiritual, Orthodox conflicts, given your current situation?: none noted     Subjective:  Pt unavailable in am 2* low H/H and receiving transfusion.  RN agreeable to therapy in pm.    Objective:  Patient found with: telemetry, arterial line, pulse ox (continuous), delgado catheter, ventilator, SCD (LVAD, oxygen, LVAD)     Functional Mobility:  Bed Mobility:   Rolling/Turning to Right: min assist  Supine<>sit with max assist     Transfers:  Sit <> Stand Assistance:  (not tested due to poor static sitting balance     Gait:   Gait Distance: not tested. see above        Therapeutic Activities and Exercises:  Pt sat EOB x 8 min with assistance ranging from CGA-mod assist.  Cues required for upright posture and for R lateral lean.  Increased lethargy as sitting EOB continued.    AM-PAC 6 CLICK MOBILITY  How much help from another person does this patient currently need?   1 = Unable, Total/Dependent Assistance  2 = A lot, Maximum/Moderate Assistance  3 = A little, Minimum/Contact Guard/Supervision  4 = None, Modified [place]/Independent     Turning over in bed (including adjusting bedclothes, sheets and blankets)?: 2  Sitting down on and standing up from a chair with arms (e.g., wheelchair, bedside commode, etc.): 1  Moving from lying on back to sitting on the side of the bed?: 1  Moving to and from a bed to a chair (including a wheelchair)?: 1  Need to walk in hospital room?: 1  Climbing 3-5 steps with a railing?: 1  Total Score: 7     AM-PAC Raw Score CMS G-Code Modifier  Level of Impairment Assistance   6 % Total / Unable   7 - 9 CM 80 - 100% Maximal Assist   10 - 14 CL 60 - 80% Moderate Assist   15 - 19 CK 40 - 60% Moderate Assist   20 - 22 CJ 20 - 40% Minimal Assist   23 CI 1-20% SBA / CGA   24 CH 0% Independent/ Mod I      Patient left supine with all lines intact and call button in reach.     Assessment:  Suman Hayden is a 67 y.o. male with a medical diagnosis of Acute on chronic combined systolic and diastolic heart failure and presents with decreased strength, mobility, transfers and endurance. Pt will continue to benefit from skilled PT 6x/wk.     Rehab identified problem list/impairments: Rehab identified problem list/impairments: impaired endurance, weakness, impaired functional mobilty, gait instability, impaired balance, decreased lower extremity function     Rehab potential is good.     Activity tolerance: Good     Discharge recommendations: Discharge Facility/Level Of Care Needs: rehab   Barriers to discharge: Barriers to Discharge: None     Equipment recommendations: Equipment Needed After Discharge:  (will determine DME needs closer to discharge)      GOALS:          Physical Therapy Goals                 Problem: Physical Therapy Goal      Goal Priority Disciplines Outcome Goal Variances Interventions   Physical Therapy Goal       PT/OT, PT Ongoing (interventions implemented as appropriate)       Description:  Goals to be met by: 17      Patient will increase functional independence with mobility by performin. Supine to sit with MInimal Assistance- not met  2. Sit to supine with MInimal Assistance - not met  3. Sit to stand transfer with Minimal Assistance- not met  4. Bed to chair transfer with Minimal Assistance- not met  5. Gait  x 50 feet with Minimal Assistance. - not met  6. Lower extremity exercise program x15 reps, with supervision, in order to increase LE strength and (I) with functional mobility. - not met                                PLAN:    Patient to be seen 6 x/week  to address the above listed problems via gait training, therapeutic activities, therapeutic exercises  Plan of Care expires: 09/09/17  Plan of Care reviewed with: patient, spouse                  Leslee Whitehead, PT  08/14/2017

## 2017-08-14 NOTE — ASSESSMENT & PLAN NOTE
- appropriate in the setting of VT aggravated by underlying AT/AFL (earlier during the admission)  - 7/28 - setting of AF undersense - device reprogrammed to VVI 80  - tachy therapy turned off  - Rec restarting Amio  - EP planning to do lead revision

## 2017-08-14 NOTE — ASSESSMENT & PLAN NOTE
-ESBL from blood culture 8/9   -Subsequent cultures NGTD   -Ertapenem ( MERRITT < 0.5). ID following

## 2017-08-14 NOTE — PROGRESS NOTES
[facility]  Infectious Disease  Progress Note    Patient Name: Suman Hyaden  MRN: [id number]  Admission Date: 7/18/2017  Length of Stay: 27 days  Attending Physician: Sunny Downing MD  Primary Care Provider: Joe Ernst MD    Isolation Status: Contact  Assessment/Plan:      Bacteremia    66 yo M PMhx non-ischemic CM on home dobutamine, HTN, HLD, s/p AICD initially presented with syncopal episodes and AICD shock, s/p LVAD placement in 7/27,now with E Coli bacteremia and ESBL E Coli from resp cx.    - continue ertapenem  - follow up with blood cx               Neto Cronin MD  Infectious Disease, PGY1  [facility]    Subjective:     Principal Problem:Acute on chronic combined systolic and diastolic heart failure    HPI: This is a 67 year old male with NICM on home dobutamine, HTN, HLD, s/p medtronic who presented after ICD shocks x 2. He is now being worked up for advanced options. He currently has IABP. He is being evaluated for LVAD.  We saw patient last weekend for LVAD clearance and he was cleared at that time with vaccines updated.  We are now re-consulted for positive urine culture with enterococcus. His UA has greater than 100 WBCs. Patient denies dysuria, urinary frequency, abd pain, or flank pain. He does not have a delgado catheter in place. He is afebrile and without leukocytosis. He is scheduled for LVAD placement tomorrow and we are re-consulted for clearance.  ID was initially consulted for clearance of LVAD and again for asymptomatic bacteruria w/ enterococcus prior to LVAD placement. ID is reconsulted s/p LVAD placement for antibiotic recommendations regarding positive blood cx and resp cx for ESBL E Coli.      Interval History:   Patient extubated over night. Patient was tachypneic this morning, however, afebrile, no tachycardia. A drop in H & H compared to previous lab, 6.5 & 18.7 . Patient is responsive but tired this morning. Otherwise no acute events  over night.     Review of Systems   Unable to perform ROS: Other, patient too tired to respond.     Objective:     Vital Signs (Most Recent):  Temp: 98.6 °F (37 °C) (08/14/17 1100)  Pulse: 80 (08/14/17 1400)  Resp: (!) 23 (08/14/17 1400)  BP: (!) 74/0 (08/14/17 0700)  SpO2: 100 % (08/14/17 1400) Vital Signs (24h Range):  Temp:  [98.6 °F (37 °C)-99.4 °F (37.4 °C)] 98.6 °F (37 °C)  Pulse:  [79-80] 80  Resp:  [14-33] 23  SpO2:  [99 %-100 %] 100 %  BP: (70-80)/(0) 74/0  Arterial Line BP: ()/() 83/64     Weight: 85 kg (187 lb 6.3 oz)  Body mass index is 28.49 kg/m².    Estimated Creatinine Clearance: 36.2 mL/min (based on Cr of 2.1).    Physical Exam   Constitutional: He appears well-developed and well-nourished.   HENT:   Head: Normocephalic and atraumatic.   Eyes: Conjunctivae are normal. No scleral icterus.   Cardiovascular: Intact distal pulses.    No murmur heard.  Pulmonary/Chest: He has no wheezes. He has rales.   Abdominal: He exhibits distension. There is no tenderness. There is no rebound and no guarding.   Musculoskeletal: He exhibits edema.   L upper ext edema > R   Lymphadenopathy:     He has no cervical adenopathy.   Neurological: He is alert.   Skin: Skin is warm.       Significant Labs:   Blood Culture:   Recent Labs  Lab 08/04/17  1653 08/04/17  1654 08/09/17  0733 08/09/17  0813 08/11/17  1326   LABBLOO No growth after 5 days. No growth after 5 days. No growth after 5 days. Gram stain clement bottle: Gram negative rods   Results called to and read back by: Emma York RN 08/10/2017  11:13  ESCHERICHIA COLI ESBL No Growth to date  No Growth to date  No Growth to date     CBC:   Recent Labs  Lab 08/14/17  0420 08/14/17  0520 08/14/17  1157   WBC 12.91*  12.91* 12.53 13.06*   HGB 6.8*  6.8* 6.5* 7.5*   HCT 19.9*  19.9* 18.7* 21.7*   *  120* 115* 129*     CMP:   Recent Labs  Lab 08/14/17  0050 08/14/17  0420 08/14/17  1157    143  143 145   K 3.7 3.8  3.8 3.9    108   108 109   CO2 22* 24  24 27   * 154*  154* 123*   * 112*  112* 119*   CREATININE 2.2* 2.1*  2.1* 2.1*   CALCIUM 7.9* 8.1*  8.1* 8.2*   PROT 5.1* 5.1*  5.1* 5.1*   ALBUMIN 1.8* 1.8*  1.8* 1.8*   BILITOT 3.3* 3.5*  3.5* 3.7*   ALKPHOS 152* 151*  151* 157*   AST 97* 101*  101* 113*   ALT 66* 70*  70* 77*   ANIONGAP 13 11  11 9   EGFRNONAA 29.9* 31.6*  31.6* 31.6*     Coagulation:   Recent Labs  Lab 08/14/17  0420   INR 4.1*   APTT 43.7*     Lactic Acid: No results for input(s): LACTATE in the last 48 hours.  Lipase: No results for input(s): LIPASE in the last 48 hours.  Prealbumin:   Recent Labs  Lab 08/14/17  0420   PREALBUMIN 6*     Procalcitonin: No results for input(s): PROCAL in the last 48 hours.  Respiratory Culture:   Recent Labs  Lab 08/09/17  0723   GSRESP <10 epithelial cells per low power field.  Few WBC's  Many Gram negative rods  Few Gram positive rods  Few Gram positive cocci   RESPIRATORYC ESCHERICHIA COLI ESBLMany     Urine Culture:   Recent Labs  Lab 07/24/17  1139 08/04/17  1655 08/09/17  0725   LABURIN ENTEROCOCCUS FAECALIS>100,000 cfu/mlNo other significant isolate No growth No growth

## 2017-08-14 NOTE — NURSING
Sterile field maintained during LVAD dressing. Scant amount of green drainage to DLES. DL secured to abdomen. No kinks noted.

## 2017-08-14 NOTE — ASSESSMENT & PLAN NOTE
Patient with nonoliguric INDIRA, likely ischemic (v. septic ATN) from renal hypoperfusion given cardiomyopathy.  Patient creatinine has remained stable, has maintained relative balance in I/O's, do not suspect elevated BUN indicative of uremia (elevation likely secondary to TPN administration and possible blood loss, agree with PRBC).  Discussed with primary team (Dr. Downing), requesting renal replacement therapy for clearance only, will plan for SLED, 6hours, no net UF to prevent any hemodynamic instability that may lead to further renal injury, will continue to monitor closely.  Patient/wife voiced understanding of above.

## 2017-08-14 NOTE — PLAN OF CARE
Problem: Patient Care Overview  Goal: Plan of Care Review  Outcome: Ongoing (interventions implemented as appropriate)  Plan of care reviewed with pt and spouse. Extubated @ 1530. Tolerating well on 4L [place]. VSS. Epi 0.04, Dopamine 5 mcg, Vasso 0.02, TPN 50cc/hr. MAP 60-80. CVP 9-11. CBC/CMP q6. US @ bedside to Left extremity. Defib pads @ bedside. 100% paced @ 80. LVAD speed 5100. Afebrile. Will continue to monitor.

## 2017-08-14 NOTE — PLAN OF CARE
Problem: Patient Care Overview  Goal: Plan of Care Review  Outcome: Ongoing (interventions implemented as appropriate)  No acute events this shift. Vaso turned off. Epi titrated to .03. 1 unit of blood given. Patient worked with PT today and did exercises on the side of the bed. Potassium replaced. VSS. Patient and spouse updated on POC. Will continue to monitor.

## 2017-08-15 LAB
ALBUMIN SERPL BCP-MCNC: 1.6 G/DL
ALBUMIN SERPL BCP-MCNC: 1.6 G/DL
ALBUMIN SERPL BCP-MCNC: 1.7 G/DL
ALBUMIN SERPL BCP-MCNC: 1.7 G/DL
ALP SERPL-CCNC: 151 U/L
ALP SERPL-CCNC: 159 U/L
ALP SERPL-CCNC: 163 U/L
ALP SERPL-CCNC: 184 U/L
ALT SERPL W/O P-5'-P-CCNC: 102 U/L
ALT SERPL W/O P-5'-P-CCNC: 124 U/L
ALT SERPL W/O P-5'-P-CCNC: 146 U/L
ALT SERPL W/O P-5'-P-CCNC: 189 U/L
ANION GAP SERPL CALC-SCNC: 10 MMOL/L
ANION GAP SERPL CALC-SCNC: 12 MMOL/L
ANION GAP SERPL CALC-SCNC: 8 MMOL/L
ANISOCYTOSIS BLD QL SMEAR: SLIGHT
APTT BLDCRRT: 35.3 SEC
AST SERPL-CCNC: 162 U/L
AST SERPL-CCNC: 210 U/L
AST SERPL-CCNC: 270 U/L
AST SERPL-CCNC: 365 U/L
BASOPHILS # BLD AUTO: 0.03 K/UL
BASOPHILS # BLD AUTO: 0.03 K/UL
BASOPHILS # BLD AUTO: ABNORMAL K/UL
BASOPHILS NFR BLD: 0 %
BASOPHILS NFR BLD: 0 %
BASOPHILS NFR BLD: 0.2 %
BASOPHILS NFR BLD: 0.2 %
BILIRUB SERPL-MCNC: 2.7 MG/DL
BILIRUB SERPL-MCNC: 2.8 MG/DL
BILIRUB SERPL-MCNC: 3.4 MG/DL
BILIRUB SERPL-MCNC: 3.7 MG/DL
BUN SERPL-MCNC: 123 MG/DL
BUN SERPL-MCNC: 126 MG/DL
BUN SERPL-MCNC: 126 MG/DL
BUN SERPL-MCNC: 127 MG/DL
BUN SERPL-MCNC: 135 MG/DL
BURR CELLS BLD QL SMEAR: ABNORMAL
CALCIUM SERPL-MCNC: 7.5 MG/DL
CALCIUM SERPL-MCNC: 7.9 MG/DL
CALCIUM SERPL-MCNC: 8.1 MG/DL
CHLORIDE SERPL-SCNC: 109 MMOL/L
CHLORIDE SERPL-SCNC: 111 MMOL/L
CHLORIDE SERPL-SCNC: 114 MMOL/L
CO2 SERPL-SCNC: 23 MMOL/L
CO2 SERPL-SCNC: 25 MMOL/L
CO2 SERPL-SCNC: 25 MMOL/L
CO2 SERPL-SCNC: 26 MMOL/L
CO2 SERPL-SCNC: 26 MMOL/L
CREAT SERPL-MCNC: 2.2 MG/DL
CREAT SERPL-MCNC: 2.4 MG/DL
DIFFERENTIAL METHOD: ABNORMAL
EOSINOPHIL # BLD AUTO: 0.2 K/UL
EOSINOPHIL # BLD AUTO: 0.2 K/UL
EOSINOPHIL # BLD AUTO: ABNORMAL K/UL
EOSINOPHIL NFR BLD: 1 %
EOSINOPHIL NFR BLD: 1 %
EOSINOPHIL NFR BLD: 1.1 %
EOSINOPHIL NFR BLD: 3 %
ERYTHROCYTE [DISTWIDTH] IN BLOOD BY AUTOMATED COUNT: 16.4 %
ERYTHROCYTE [DISTWIDTH] IN BLOOD BY AUTOMATED COUNT: 16.4 %
ERYTHROCYTE [DISTWIDTH] IN BLOOD BY AUTOMATED COUNT: 16.6 %
ERYTHROCYTE [DISTWIDTH] IN BLOOD BY AUTOMATED COUNT: 16.9 %
EST. GFR  (AFRICAN AMERICAN): 31.1 ML/MIN/1.73 M^2
EST. GFR  (AFRICAN AMERICAN): 34.6 ML/MIN/1.73 M^2
EST. GFR  (NON AFRICAN AMERICAN): 26.9 ML/MIN/1.73 M^2
EST. GFR  (NON AFRICAN AMERICAN): 29.9 ML/MIN/1.73 M^2
GIANT PLATELETS BLD QL SMEAR: PRESENT
GLUCOSE SERPL-MCNC: 121 MG/DL
GLUCOSE SERPL-MCNC: 135 MG/DL
GLUCOSE SERPL-MCNC: 140 MG/DL
GLUCOSE SERPL-MCNC: 140 MG/DL
GLUCOSE SERPL-MCNC: 143 MG/DL
HCT VFR BLD AUTO: 19.6 %
HCT VFR BLD AUTO: 19.7 %
HCT VFR BLD AUTO: 20.4 %
HCT VFR BLD AUTO: 20.5 %
HGB BLD-MCNC: 6.7 G/DL
HGB BLD-MCNC: 6.8 G/DL
HGB BLD-MCNC: 7.1 G/DL
HGB BLD-MCNC: 7.2 G/DL
HYPOCHROMIA BLD QL SMEAR: ABNORMAL
INR PPP: 2.8
LDH SERPL L TO P-CCNC: 375 U/L
LYMPHOCYTES # BLD AUTO: 1.6 K/UL
LYMPHOCYTES # BLD AUTO: 1.8 K/UL
LYMPHOCYTES # BLD AUTO: ABNORMAL K/UL
LYMPHOCYTES NFR BLD: 10 %
LYMPHOCYTES NFR BLD: 12 %
LYMPHOCYTES NFR BLD: 12 %
LYMPHOCYTES NFR BLD: 9.5 %
MAGNESIUM SERPL-MCNC: 2.2 MG/DL
MAGNESIUM SERPL-MCNC: 2.5 MG/DL
MCH RBC QN AUTO: 26.9 PG
MCH RBC QN AUTO: 27.3 PG
MCH RBC QN AUTO: 27.3 PG
MCH RBC QN AUTO: 28 PG
MCHC RBC AUTO-ENTMCNC: 34.2 G/DL
MCHC RBC AUTO-ENTMCNC: 34.5 G/DL
MCHC RBC AUTO-ENTMCNC: 34.6 G/DL
MCHC RBC AUTO-ENTMCNC: 35.3 G/DL
MCV RBC AUTO: 79 FL
METAMYELOCYTES NFR BLD MANUAL: 1 %
MONOCYTES # BLD AUTO: 1.3 K/UL
MONOCYTES # BLD AUTO: 1.4 K/UL
MONOCYTES # BLD AUTO: ABNORMAL K/UL
MONOCYTES NFR BLD: 2 %
MONOCYTES NFR BLD: 3 %
MONOCYTES NFR BLD: 7.1 %
MONOCYTES NFR BLD: 8.5 %
MYELOCYTES NFR BLD MANUAL: 1 %
NEUTROPHILS # BLD AUTO: 13.4 K/UL
NEUTROPHILS # BLD AUTO: 14.6 K/UL
NEUTROPHILS NFR BLD: 78 %
NEUTROPHILS NFR BLD: 80.7 %
NEUTROPHILS NFR BLD: 81.7 %
NEUTROPHILS NFR BLD: 85 %
NEUTS BAND NFR BLD MANUAL: 2 %
NRBC BLD-RTO: ABNORMAL /100 WBC
OVALOCYTES BLD QL SMEAR: ABNORMAL
OVALOCYTES BLD QL SMEAR: ABNORMAL
PHOSPHATE SERPL-MCNC: 3.1 MG/DL
PHOSPHATE SERPL-MCNC: 3.5 MG/DL
PLATELET # BLD AUTO: 121 K/UL
PLATELET # BLD AUTO: 138 K/UL
PLATELET # BLD AUTO: 146 K/UL
PLATELET # BLD AUTO: 154 K/UL
PLATELET BLD QL SMEAR: ABNORMAL
PMV BLD AUTO: 11.9 FL
PMV BLD AUTO: 12.2 FL
PMV BLD AUTO: 12.4 FL
PMV BLD AUTO: 13.4 FL
POCT GLUCOSE: 127 MG/DL (ref 70–110)
POCT GLUCOSE: 133 MG/DL (ref 70–110)
POCT GLUCOSE: 137 MG/DL (ref 70–110)
POCT GLUCOSE: 147 MG/DL (ref 70–110)
POIKILOCYTOSIS BLD QL SMEAR: SLIGHT
POIKILOCYTOSIS BLD QL SMEAR: SLIGHT
POLYCHROMASIA BLD QL SMEAR: ABNORMAL
POTASSIUM SERPL-SCNC: 3.8 MMOL/L
POTASSIUM SERPL-SCNC: 3.9 MMOL/L
POTASSIUM SERPL-SCNC: 4 MMOL/L
PROT SERPL-MCNC: 5 G/DL
PROT SERPL-MCNC: 5 G/DL
PROT SERPL-MCNC: 5.1 G/DL
PROT SERPL-MCNC: 5.3 G/DL
PROTHROMBIN TIME: 28.3 SEC
RBC # BLD AUTO: 2.49 M/UL
RBC # BLD AUTO: 2.49 M/UL
RBC # BLD AUTO: 2.57 M/UL
RBC # BLD AUTO: 2.6 M/UL
SCHISTOCYTES BLD QL SMEAR: ABNORMAL
SODIUM SERPL-SCNC: 144 MMOL/L
SODIUM SERPL-SCNC: 145 MMOL/L
SODIUM SERPL-SCNC: 145 MMOL/L
SODIUM SERPL-SCNC: 146 MMOL/L
SODIUM SERPL-SCNC: 147 MMOL/L
TARGETS BLD QL SMEAR: ABNORMAL
TARGETS BLD QL SMEAR: ABNORMAL
WBC # BLD AUTO: 14.2 K/UL
WBC # BLD AUTO: 16.76 K/UL
WBC # BLD AUTO: 16.91 K/UL
WBC # BLD AUTO: 18.15 K/UL

## 2017-08-15 PROCEDURE — 97535 SELF CARE MNGMENT TRAINING: CPT

## 2017-08-15 PROCEDURE — 94761 N-INVAS EAR/PLS OXIMETRY MLT: CPT

## 2017-08-15 PROCEDURE — 83735 ASSAY OF MAGNESIUM: CPT

## 2017-08-15 PROCEDURE — 84100 ASSAY OF PHOSPHORUS: CPT | Mod: 91

## 2017-08-15 PROCEDURE — 63600175 PHARM REV CODE 636 W HCPCS: Performed by: STUDENT IN AN ORGANIZED HEALTH CARE EDUCATION/TRAINING PROGRAM

## 2017-08-15 PROCEDURE — 97110 THERAPEUTIC EXERCISES: CPT

## 2017-08-15 PROCEDURE — B4185 PARENTERAL SOL 10 GM LIPIDS: HCPCS | Performed by: STUDENT IN AN ORGANIZED HEALTH CARE EDUCATION/TRAINING PROGRAM

## 2017-08-15 PROCEDURE — 80100008 HC CRRT DAILY MAINTENANCE

## 2017-08-15 PROCEDURE — A4216 STERILE WATER/SALINE, 10 ML: HCPCS | Performed by: THORACIC SURGERY (CARDIOTHORACIC VASCULAR SURGERY)

## 2017-08-15 PROCEDURE — C9113 INJ PANTOPRAZOLE SODIUM, VIA: HCPCS | Performed by: STUDENT IN AN ORGANIZED HEALTH CARE EDUCATION/TRAINING PROGRAM

## 2017-08-15 PROCEDURE — 85027 COMPLETE CBC AUTOMATED: CPT | Mod: 91

## 2017-08-15 PROCEDURE — 80053 COMPREHEN METABOLIC PANEL: CPT | Mod: 91

## 2017-08-15 PROCEDURE — 25000003 PHARM REV CODE 250: Performed by: STUDENT IN AN ORGANIZED HEALTH CARE EDUCATION/TRAINING PROGRAM

## 2017-08-15 PROCEDURE — 20000000 HC ICU ROOM

## 2017-08-15 PROCEDURE — 85007 BL SMEAR W/DIFF WBC COUNT: CPT | Mod: 91

## 2017-08-15 PROCEDURE — 83615 LACTATE (LD) (LDH) ENZYME: CPT

## 2017-08-15 PROCEDURE — 99233 SBSQ HOSP IP/OBS HIGH 50: CPT | Mod: ,,, | Performed by: SURGERY

## 2017-08-15 PROCEDURE — 93750 INTERROGATION VAD IN PERSON: CPT | Performed by: THORACIC SURGERY (CARDIOTHORACIC VASCULAR SURGERY)

## 2017-08-15 PROCEDURE — 84100 ASSAY OF PHOSPHORUS: CPT

## 2017-08-15 PROCEDURE — 83735 ASSAY OF MAGNESIUM: CPT | Mod: 91

## 2017-08-15 PROCEDURE — 85610 PROTHROMBIN TIME: CPT

## 2017-08-15 PROCEDURE — 93750 INTERROGATION VAD IN PERSON: CPT | Mod: ,,, | Performed by: THORACIC SURGERY (CARDIOTHORACIC VASCULAR SURGERY)

## 2017-08-15 PROCEDURE — 97803 MED NUTRITION INDIV SUBSEQ: CPT

## 2017-08-15 PROCEDURE — 27000221 HC OXYGEN, UP TO 24 HOURS

## 2017-08-15 PROCEDURE — 27000248 HC VAD-ADDITIONAL DAY

## 2017-08-15 PROCEDURE — 99232 SBSQ HOSP IP/OBS MODERATE 35: CPT | Mod: GC,,, | Performed by: INTERNAL MEDICINE

## 2017-08-15 PROCEDURE — 90945 DIALYSIS ONE EVALUATION: CPT

## 2017-08-15 PROCEDURE — 99233 SBSQ HOSP IP/OBS HIGH 50: CPT | Mod: 24,,, | Performed by: THORACIC SURGERY (CARDIOTHORACIC VASCULAR SURGERY)

## 2017-08-15 PROCEDURE — 85730 THROMBOPLASTIN TIME PARTIAL: CPT

## 2017-08-15 PROCEDURE — 25000003 PHARM REV CODE 250: Performed by: THORACIC SURGERY (CARDIOTHORACIC VASCULAR SURGERY)

## 2017-08-15 PROCEDURE — 80053 COMPREHEN METABOLIC PANEL: CPT

## 2017-08-15 PROCEDURE — 99233 SBSQ HOSP IP/OBS HIGH 50: CPT | Mod: ,,, | Performed by: INTERNAL MEDICINE

## 2017-08-15 PROCEDURE — 85025 COMPLETE CBC W/AUTO DIFF WBC: CPT | Mod: 91

## 2017-08-15 RX ORDER — HYDROCODONE BITARTRATE AND ACETAMINOPHEN 500; 5 MG/1; MG/1
TABLET ORAL CONTINUOUS
Status: ACTIVE | OUTPATIENT
Start: 2017-08-15 | End: 2017-08-16

## 2017-08-15 RX ORDER — WARFARIN 1 MG/1
1 TABLET ORAL ONCE
Status: COMPLETED | OUTPATIENT
Start: 2017-08-15 | End: 2017-08-15

## 2017-08-15 RX ORDER — MAGNESIUM SULFATE HEPTAHYDRATE 40 MG/ML
2 INJECTION, SOLUTION INTRAVENOUS
Status: ACTIVE | OUTPATIENT
Start: 2017-08-15 | End: 2017-08-16

## 2017-08-15 RX ORDER — HYDROCODONE BITARTRATE AND ACETAMINOPHEN 500; 5 MG/1; MG/1
TABLET ORAL
Status: DISCONTINUED | OUTPATIENT
Start: 2017-08-15 | End: 2017-08-16

## 2017-08-15 RX ADMIN — POLYETHYLENE GLYCOL 3350 17 G: 17 POWDER, FOR SOLUTION ORAL at 08:08

## 2017-08-15 RX ADMIN — DOCUSATE SODIUM 50 MG: 50 CAPSULE, LIQUID FILLED ORAL at 08:08

## 2017-08-15 RX ADMIN — WARFARIN SODIUM 1 MG: 1 TABLET ORAL at 05:08

## 2017-08-15 RX ADMIN — Medication 10 ML: at 12:08

## 2017-08-15 RX ADMIN — SOYBEAN OIL 250 ML: 20 INJECTION, SOLUTION INTRAVENOUS at 09:08

## 2017-08-15 RX ADMIN — PANTOPRAZOLE SODIUM 40 MG: 40 INJECTION, POWDER, FOR SOLUTION INTRAVENOUS at 08:08

## 2017-08-15 RX ADMIN — CALCIUM GLUCONATE: 94 INJECTION, SOLUTION INTRAVENOUS at 09:08

## 2017-08-15 RX ADMIN — DOPAMINE HYDROCHLORIDE IN DEXTROSE 5 MCG/KG/MIN: 1.6 INJECTION, SOLUTION INTRAVENOUS at 03:08

## 2017-08-15 RX ADMIN — PANTOPRAZOLE SODIUM 40 MG: 40 INJECTION, POWDER, FOR SOLUTION INTRAVENOUS at 09:08

## 2017-08-15 RX ADMIN — Medication 10 ML: at 06:08

## 2017-08-15 RX ADMIN — ERTAPENEM SODIUM 1 G: 1 INJECTION, POWDER, LYOPHILIZED, FOR SOLUTION INTRAMUSCULAR; INTRAVENOUS at 09:08

## 2017-08-15 NOTE — SUBJECTIVE & OBJECTIVE
Interval History: Patient moderately responsive to questioning this am, denies any respiratory distress on nasal cannula this am.     Review of patient's allergies indicates:  No Known Allergies  Current Facility-Administered Medications   Medication Frequency    0.9%  NaCl infusion (CRRT USE ONLY) Continuous    0.9%  NaCl infusion (for blood administration) Q24H PRN    0.9%  NaCl infusion (for blood administration) Q24H PRN    acetaminophen tablet 650 mg Q6H PRN    albumin human 5% bottle 500 mL PRN    albuterol nebulizer solution 2.5 mg Q4H PRN    bisacodyl suppository 10 mg Daily PRN    dextrose 50% injection 12.5 g PRN    docusate sodium capsule 50 mg Daily    DOPamine 400 mg in dextrose 5 % 250 mL infusion (premix) (NON-TITRATING) Continuous    EPINEPHrine (ADRENALIN) 4 mg in sodium chloride 0.9% 250 mL infusion Continuous    ertapenem (INVANZ) 1 g in sodium chloride 0.9 % 100 mL IVPB (ready to mix system) Q24H    fat emulsion 20% infusion 250 mL Daily    fentaNYL injection 50 mcg Q4H PRN    glucagon (human recombinant) injection 1 mg PRN    hydrALAZINE injection 10 mg Q6H PRN    insulin aspart pen 0-5 Units Q4H PRN    magnesium sulfate 2g in water 50mL IVPB (premix) PRN    omnipaque oral solution 15 mL PRN    ondansetron injection 4 mg Q6H PRN    pantoprazole injection 40 mg BID    polyethylene glycol packet 17 g Daily    sodium chloride 0.9% flush 10 mL Q6H    And    sodium chloride 0.9% flush 10 mL PRN    sodium phosphate 20.01 mmol in dextrose 5 % 250 mL IVPB PRN    sodium phosphate 30 mmol in dextrose 5 % 250 mL IVPB PRN    sodium phosphate 39.99 mmol in dextrose 5 % 250 mL IVPB PRN    TPN ADULT CENTRAL LINE CUSTOM Continuous    TPN ADULT CENTRAL LINE CUSTOM Continuous    vasopressin (PITRESSIN) 100 Units in dextrose 5 % 100 mL infusion Continuous       Objective:     Vital Signs (Most Recent):  Temp: 98 °F (36.7 °C) (08/15/17 1500)  Pulse: 100 (08/15/17 1800)  Resp: (!) 24  (08/15/17 1800)  BP: (!) 84/0 (08/15/17 1500)  SpO2: 100 % (08/15/17 1800)  O2 Device (Oxygen Therapy): nasal cannula (08/15/17 0829) Vital Signs (24h Range):  Temp:  [98 °F (36.7 °C)-98.9 °F (37.2 °C)] 98 °F (36.7 °C)  Pulse:  [] 100  Resp:  [20-34] 24  SpO2:  [85 %-100 %] 100 %  BP: (78-90)/(0) 84/0  Arterial Line BP: ()/(61-80) 95/64     Weight: 92.2 kg (203 lb 4.2 oz) (08/15/17 1000)  Body mass index is 30.91 kg/m².  Body surface area is 2.1 meters squared.    I/O last 3 completed shifts:  In: 3778.3 [I.V.:1492; Blood:350]  Out: 2530 [Urine:2530]    Physical Exam   Constitutional: He appears well-developed and well-nourished.   Cardiovascular: Exam reveals no gallop and no friction rub.    No murmur heard.  Mechanical device sounds   Pulmonary/Chest: Effort normal and breath sounds normal. No respiratory distress. He has no wheezes. He has no rales.   Abdominal: Soft. There is no tenderness.   Musculoskeletal: He exhibits edema.   Neurological: He is alert.   Skin: Skin is warm and dry. No rash noted.   Vitals reviewed.      Significant Labs:   Reviewed    Significant Imaging:  Reviewed

## 2017-08-15 NOTE — PROGRESS NOTES
Daily E and M and VAD Interrogation Note    Reason for Visit:  Patient is seen in follow up for management of:  [] HeartMate II  [] Heartware [] Total artificial heart       [] ECMO           [x] Other - HM III     Interval History:  [] Interval history unobtainable due to intubation.  The [x] implant/[] explant date was 7/27/17    Events - No acute events o/n. Still no BM, continues to belch.          Review of Systems:   Negative except as above.      Medications:  Current Facility-Administered Medications   Medication Dose Route Frequency Provider Last Rate Last Dose    0.9%  NaCl infusion (for blood administration)   Intravenous Q24H PRN Shayne Espinoza MD        acetaminophen tablet 650 mg  650 mg Oral Q6H PRN Shayne Espinoza MD   650 mg at 08/08/17 2122    albumin human 5% bottle 500 mL  500 mL Intravenous PRN Shayne Espinoza MD   500 mL at 08/09/17 0700    albuterol nebulizer solution 2.5 mg  2.5 mg Nebulization Q4H PRN Shayne Espinoza MD        bisacodyl suppository 10 mg  10 mg Rectal Daily PRN Shayne Espinoza MD   10 mg at 08/06/17 2036    dextrose 50% injection 12.5 g  12.5 g Intravenous PRN Charbel Ritter MD        docusate sodium capsule 50 mg  50 mg Oral Daily Shayne Espinoza MD   50 mg at 08/15/17 0835    DOPamine 400 mg in dextrose 5 % 250 mL infusion (premix) (NON-TITRATING)  5 mcg/kg/min (Dosing Weight) Intravenous Continuous Shayne Espinoza MD 15 mL/hr at 08/15/17 1000 5 mcg/kg/min at 08/15/17 1000    EPINEPHrine (ADRENALIN) 4 mg in sodium chloride 0.9% 250 mL infusion  0.01 mcg/kg/min (Dosing Weight) Intravenous Continuous Shayne Espinoza MD 3 mL/hr at 08/15/17 1000 0.01 mcg/kg/min at 08/15/17 1000    ertapenem (INVANZ) 1 g in sodium chloride 0.9 % 100 mL IVPB (ready to mix system)  1 g Intravenous Q24H Edwige Clay  mL/hr at 08/14/17 1953 1 g at 08/14/17 1953    fentaNYL injection 50 mcg  50 mcg Intravenous Q4H PRN Shayne Espinoza MD    50 mcg at 08/13/17 1310    glucagon (human recombinant) injection 1 mg  1 mg Intramuscular PRN Charbel Ritter MD        hydrALAZINE injection 10 mg  10 mg Intravenous Q6H PRN Shayne Espinoza MD   10 mg at 08/08/17 1509    insulin aspart pen 0-5 Units  0-5 Units Subcutaneous Q4H PRN Charbel Ritter MD   2 Units at 08/10/17 0751    omnipaque oral solution 15 mL  15 mL Oral PRN Julio Cesar Rees MD   15 mL at 08/12/17 1758    ondansetron injection 4 mg  4 mg Intravenous Q6H PRN Shayne Espinoza MD   4 mg at 08/08/17 2112    pantoprazole injection 40 mg  40 mg Intravenous BID Shayne Espinoza MD   40 mg at 08/15/17 0835    polyethylene glycol packet 17 g  17 g Oral Daily Shayne Espinoza MD   17 g at 08/15/17 0836    sodium chloride 0.9% flush 10 mL  10 mL Intravenous Q6H Sunny Downing MD   10 mL at 08/15/17 0600    And    sodium chloride 0.9% flush 10 mL  10 mL Intravenous PRN Sunny Downing MD   10 mL at 08/10/17 1151    TPN ADULT CENTRAL LINE CUSTOM   Intravenous Continuous Shayne Espinoza MD 50 mL/hr at 08/15/17 1000      vasopressin (PITRESSIN) 100 Units in dextrose 5 % 100 mL infusion  0.04 Units/min Intravenous Continuous Sunny Downing MD   Stopped at 08/14/17 0800     Physical Examination:  Vital Signs:   Vitals:    08/15/17 1100   BP:    Pulse: 79   Resp: 20   Temp: 98.2 °F (36.8 °C)     Cardiovascular:  [x] Regular rate and rhythm [] Irregular []  None (MATT) []  Other  []  No edema [x]  Edema present  [x]  Clear to auscultation  []  Rales to []  Coarse  []  No rales but   [] Pedal Pulses absent  [x]  Pulses  + throughout  Skin:  Incision is [x]  Clean, dry and intact.  []  Other   Sternum:  [x]  Stable []  Unstable  Driveline(s):   [x]  Clean, dry and intact. []  Other     Labs:  BMP  Lab Results   Component Value Date     08/15/2017     08/15/2017    K 3.8 08/15/2017    K 3.8 08/15/2017     08/15/2017     08/15/2017    CO2 26 08/15/2017    CO2 26  08/15/2017     (H) 08/15/2017     (H) 08/15/2017    CREATININE 2.2 (H) 08/15/2017    CREATININE 2.2 (H) 08/15/2017    CALCIUM 8.1 (L) 08/15/2017    CALCIUM 8.1 (L) 08/15/2017    ANIONGAP 10 08/15/2017    ANIONGAP 10 08/15/2017    ESTGFRAFRICA 34.6 (A) 08/15/2017    ESTGFRAFRICA 34.6 (A) 08/15/2017    EGFRNONAA 29.9 (A) 08/15/2017    EGFRNONAA 29.9 (A) 08/15/2017     Magnesium   Date Value Ref Range Status   08/15/2017 2.5 1.6 - 2.6 mg/dL Final     Lab Results   Component Value Date    WBC 16.91 (H) 08/15/2017    HGB 7.2 (L) 08/15/2017    HCT 20.4 (L) 08/15/2017    MCV 79 (L) 08/15/2017     (L) 08/15/2017     Lab Results   Component Value Date    INR 2.8 (H) 08/15/2017    INR 4.1 (H) 08/14/2017    INR 5.7 (HH) 08/13/2017     BNP   Date Value Ref Range Status   08/14/2017 2,873 (H) 0 - 99 pg/mL Final     Comment:     Values of less than 100 pg/ml are consistent with non-CHF populations.   08/11/2017 2,609 (H) 0 - 99 pg/mL Final     Comment:     Values of less than 100 pg/ml are consistent with non-CHF populations.   08/09/2017 1,232 (H) 0 - 99 pg/mL Final     Comment:     Values of less than 100 pg/ml are consistent with non-CHF populations.   08/09/2017 1,232 (H) 0 - 99 pg/mL Final     Comment:     Values of less than 100 pg/ml are consistent with non-CHF populations.     LD   Date Value Ref Range Status   08/15/2017 375 (H) 110 - 260 U/L Final     Comment:     Results are increased in hemolyzed samples.   08/14/2017 279 (H) 110 - 260 U/L Final     Comment:     Results are increased in hemolyzed samples.   08/13/2017 278 (H) 110 - 260 U/L Final     Comment:     Results are increased in hemolyzed samples.     X-Rays:  [x]  I reviewed today's Chest x-ray    Procedure:  Device Interrogation including analysis of device parameters.  Current Settings   [x]  Ventricular Assist Device  []  Total Artificial Heart interrogated  Review of device function is [x]  Stable []  Other   TXP LVAD INTERROGATIONS  8/15/2017 8/15/2017 8/15/2017 8/15/2017 8/15/2017 8/15/2017 8/15/2017   Type HeartMate3 HeartMate3 HeartMate3 HeartMate3 HeartMate3 HeartMate3 HeartMate3   Flow 3.9 3.9 3.9 3.9 3.8 3.9 3.9   Speed 5100 5100 5100 5100 5100 5100 5150   PI 4.1 4.2 4.2 3.8 4.1 3.6 3.8   Power ([place]) 3.4 3.5 3.4 3.4 3.4 3.5 3.4   LSL - - - - 4700 - -   Pulsatility - - - - Pulse - -   Some recent data might be hidden       Assessment:  [x]  Primary Cardiomyopathy []  Congestive Heart Failure   []  Atrial Fibrillation []  Ventricular Tachycardia   []  Aftercare cardiac device []  Long term (current) use of anticoagulants   []  Ventilator-associated pneumonia []  Pneumonia viral, unspecified   []  Pneumonia, bacterial, unspecified []  Pneumonia, organism unspecified   []  Hemorrhage of GI tract, unspecified    []  Nosebleed []  Driveline infection   []  Infection VAD device []  New onset of    []        Plan:  [x]  Interval history obtained from ICU attending team member during rounding today  []  VAD/MATT teaching performed with patient  []  Mobilization / Physical Therapy ongoing  []  Anticoagulation []  Ongoing []  Held  []  Studies ordered  []   To OR today for closure.       Total time spent was 30 minutes.  Of which more than 50 percent of the care dominated counseling and coordinating care with different team members. The VAD was interrogated and all parameters were WNL and no significant findings were found in the history. All these findings are documented in the note above.    Neuro:  - Alert and oriented  - Wean sedation as tolerated     Resp:  - Extubated  - Resp cultures with ESBL - Ertapenem started per ID   - Minimize supplemental O2  - Pulmonary toilet    CV:  - HDS; LVAD numbers stable  -   - Dobutamine off  - Dopamine, epi - wean epi off  - Vaso weaned off  - Hold  given GI bleed    Heme:  - Hgb/Hct stable  - Continue to trend  - INR 2.8 this AM   - Will restart coumadin at 1 mg tonight  - Heparin  off    ID:  - afebrile  - Ertapenem started for ESBL pos resp culture  - continue to monitor     Renal:  - Singer in place  - Strict I/Os   - Adequate UOP  - Lasix gtt off  -  this AM - will likely need dialysis    FEN/GI:  - Strict NPO  - Bowel reg  - Protonix 40 BID  - Replace lytes PRN  - Ileus resolved   - Continue TPN    Endo:  - Endocrine following, appreciate assistance  - Accuchecks  - Insulin ssi    Dispo:  - Continue ICU care.       Shayne Espinoza MD  General Surgery PGY-2  Pager: [phone]    Cardiac Surgery Attending E and M (VAD) Note along with VAD Interrogation    I have seen and examined the patient and agree with the findings above    I also reviewed the patients clinical course and:  [x]  Hemodynamic & Respiratory paramters  [x]  Laboratory Data  [x]  Radiological studies     VAD Interrogated [x]      VAD Function is normal. Changes made []  None [x]        Interrogation of Ventricular assist device was performed with physician analysis of device parameters and review of device function. I have personally reviewed the interrogation findings and agree with findings as stated

## 2017-08-15 NOTE — PROGRESS NOTES
[facility]  Infectious Disease  Progress Note    Patient Name: Suman Hayden  MRN: [id number]  Admission Date: 7/18/2017  Length of Stay: 28 days  Attending Physician: Sunny Downing MD  Primary Care Provider: Joe Ernst MD    Isolation Status: Contact  Assessment/Plan:      Bacteremia    66 yo M PMhx non-ischemic CM on home dobutamine, HTN, HLD, s/p AICD initially presented with syncopal episodes and AICD shock, s/p LVAD placement in 7/27,now found with E Coli bacteremia and ESBL E Coli from resp cx.    - continue ertapenem  - Blood culture NGTD, still waiting for the 2nd bottle blood cx result  -Continue ertapenem for 14 days from the 08/11/17, ie: D/C Abx on the 26th unless patient continue to develop fever, elevated WBC or future growth on blood culture               Thank you for your consult. ID will sign off. Please contact us if you have any additional questions.    Neto Cronin MD  Infectious Disease  [facility]    Subjective:     Principal Problem:Acute on chronic combined systolic and diastolic heart failure    HPI: This is a 67 year old male with NICM on home dobutamine, HTN, HLD, s/p medtronic who presented after ICD shocks x 2. He is now being worked up for advanced options. He currently has IABP. He is being evaluated for LVAD.  We saw patient last weekend for LVAD clearance and he was cleared at that time with vaccines updated.  We are now re-consulted for positive urine culture with enterococcus. His UA has greater than 100 WBCs. Patient denies dysuria, urinary frequency, abd pain, or flank pain. He does not have a delgado catheter in place. He is afebrile and without leukocytosis. He is scheduled for LVAD placement tomorrow and we are re-consulted for clearance.  ID was initially consulted for clearance of LVAD and again for asymptomatic bacteruria w/ enterococcus prior to LVAD placement. ID is reconsulted s/p LVAD placement for antibiotic recommendations  regarding positive blood cx and resp cx for ESBL E Coli.    Interval History: Patient looks much better this morning. He is able to communicate. Alert and oriented to person, place but not to time. He has been afebrile, however, has elevated WBC of 16.91 from 14.20. Blood culture drawn from the 11th has NGTD, one bottle still in process however.     Review of Systems   Unable to perform ROS: Other   Constitutional: Negative for chills and fever.   Respiratory: Negative for cough and shortness of breath.      Objective:     Vital Signs (Most Recent):  Temp: 98.2 °F (36.8 °C) (08/15/17 1100)  Pulse: 79 (08/15/17 1400)  Resp: (!) 25 (08/15/17 1400)  BP: (!) 86/0 (08/15/17 0700)  SpO2: 100 % (08/15/17 1400) Vital Signs (24h Range):  Temp:  [97.6 °F (36.4 °C)-98.9 °F (37.2 °C)] 98.2 °F (36.8 °C)  Pulse:  [79-81] 79  Resp:  [20-34] 25  SpO2:  [85 %-100 %] 100 %  BP: (72-90)/(0) 86/0  Arterial Line BP: ()/(61-80) 83/62     Weight: 92.2 kg (203 lb 4.2 oz)  Body mass index is 30.91 kg/m².    Estimated Creatinine Clearance: 35.9 mL/min (based on Cr of 2.2).    Physical Exam   Constitutional: He appears well-developed and well-nourished.   HENT:   Head: Normocephalic and atraumatic.   Eyes: Conjunctivae are normal. No scleral icterus.   Cardiovascular: Intact distal pulses.    No murmur heard.  Pulmonary/Chest: He has no wheezes. He has rales.   Abdominal: He exhibits distension. There is no tenderness. There is no rebound and no guarding.   Musculoskeletal: He exhibits edema.   L upper ext edema > R   Lymphadenopathy:     He has no cervical adenopathy.   Neurological: He is alert.   Skin: Skin is warm.       Significant Labs:   Blood Culture:   Recent Labs  Lab 08/04/17  1653 08/04/17  1654 08/09/17  0733 08/09/17  0813 08/11/17  1326   LABBLOO No growth after 5 days. No growth after 5 days. No growth after 5 days. Gram stain clement bottle: Gram negative rods   Results called to and read back by: Emma York RN  08/10/2017  11:13  ESCHERICHIA COLI ESBL No Growth to date  No Growth to date  No Growth to date  No Growth to date     CBC:   Recent Labs  Lab 08/15/17  0000 08/15/17  0506 08/15/17  1150   WBC 14.20* 16.91* 18.15*   HGB 7.1* 7.2* 6.7*   HCT 20.5* 20.4* 19.6*   * 138* 154     CMP:   Recent Labs  Lab 08/15/17  0000 08/15/17  0506 08/15/17  1150    145  145 146*   K 3.8 3.8  3.8 4.0    109  109 111*   CO2 25 26  26 23   * 140*  140* 135*   * 126*  126* 127*   CREATININE 2.2* 2.2*  2.2* 2.2*   CALCIUM 7.5* 8.1*  8.1* 7.9*   PROT 5.0* 5.3* 5.1*   ALBUMIN 1.7* 1.7* 1.6*   BILITOT 3.7* 3.4* 2.7*   ALKPHOS 151* 159* 163*   * 210* 270*   * 124* 146*   ANIONGAP 10 10  10 12   EGFRNONAA 29.9* 29.9*  29.9* 29.9*

## 2017-08-15 NOTE — PROGRESS NOTES
to see pt for follow up.  Worker spoke to the pt and explained role.  The pt's spouse was at lunch.  The pt reports family has visited and today is a better day than yesterday.  Worker provided general support.  Transplant  will follow.

## 2017-08-15 NOTE — PLAN OF CARE
Problem: Physical Therapy Goal  Goal: Physical Therapy Goal  Goals to be met by: 17     Patient will increase functional independence with mobility by performin. Supine to sit with MInimal Assistance- not met  2. Sit to supine with MInimal Assistance - not met  3. Sit to stand transfer with Minimal Assistance- not met  4. Bed to chair transfer with Minimal Assistance- not met  5. Gait  x 50 feet with Minimal Assistance. - not met  6. Lower extremity exercise program x15 reps, with supervision, in order to increase LE strength and (I) with functional mobility. - not met       Goals remain appropriate. Astrid Whaley, PT 8/15/2017

## 2017-08-15 NOTE — PT/OT/SLP PROGRESS
Speech Language Pathology Note  Attempted    Attempted to see pt today at 1321; however, per RN, pt NPO 2/2 concerns for ileus. RN denied pt difficulty with medication and sips of thin liquid this morning.SLP to fu at next scheduled visit when able to participate in PO trials. Thank you.       Mercedes Langley M.A. CCC-SLP  Speech Language Pathologist  [phone]  8/15/2017

## 2017-08-15 NOTE — PROGRESS NOTES
[facility]  Adult Nutrition  Progress Note    SUMMARY     Recommendations    Recommendation/Intervention: 1. Cont w/ current TPN provision as it is adequate to meet pt's EEN/EPN. Low prealb noted but CRP remains elevated. Cont to trend.    2. Once able to adv diet & pt's intake is maintained @ >50%, wean TPN    3. RD to cont to monitor all nutr parameters    Goals: Meet >/=85% of EEN/EPN  Nutrition Goal Status: goal met  Communication of RD Recs: reviewed with RN      Reason for Assessment    Reason for Assessment: RD follow-up  Diagnosis:  (s/p LVAD)  Relevent Medical History: NICM, HTN, HLD   Interdisciplinary Rounds: attended     General Information Comments: S/p LVAD 7/27; developed INDIRA post-operatively; reintubated 8/9 then extubated 8/13; on NC. Passed bedside swallow eval but remains NPO 2/2 ileus (per RN). TPN infusing.     Nutrition Discharge Planning: Unable to determine    Nutrition Prescription Ordered    Current Diet Order: NPO  Nutrition Order Comments: -  Current Nutrition Support Formula Ordered: Other (Comment) (Custom TPN: 8.5% amino acid/20% dextrose + lipid)  Current Nutrition Support Rate Ordered: 50 (ml)  Current Nutrition Support Frequency Ordered: mL/hr  Oral Nutrition Supplement: -     Evaluation of Received Nutrients/Fluid Intake     Parenteral Calories (kcal): 1224  Parenteral Protein (gm): 102  Parenteral Fluid (mL): 1200           Other Calories (kcal): 0  Total Calories (kcal): 1724     Energy Calories Required: meeting needs  % Kcal Needs: 89              Protein Required: meeting needs  % Protein Needs: 100     IV Fluid (mL):  (IVPB meds)     GIR (Glucose Infusion Rate) (mg/kg/min): 2.09 mg/kg/min  Lipid Calories (kcals): 500 kcals (via 250 mls 20% IL)  I/O: +1.16L x 24 hrs; good uop; LBM 8/12         Fluid Required: meeting needs  Comments: Awaiting return of gut function/flatus per RN's report  Tolerance: tolerating  % Intake of Estimated Energy Needs: 75 - 100  "%  % Meal Intake: NPO     Nutrition Risk Screen     Nutrition Risk Screen: no indicators present    Nutrition/Diet History    Patient Reported Diet/Restrictions/Preferences: general, low salt  Typical Food/Fluid Intake: TPN/NPO currently  Food Preferences: MARIE        Factors Affecting Nutritional Intake: NPO, altered gastrointestinal function                Labs/Tests/Procedures/Meds       Pertinent Labs Reviewed: reviewed  Pertinent Labs Comments: , Crt 2.2, Glu 140, POCT 133-147, Alb 1.7, .3, prealb 6  Pertinent Medications Reviewed: reviewed  Pertinent Medications Comments: dopamine, epi, docusate, pantoprazole, KCl, warfarin    Physical Findings    Overall Physical Appearance: on oxygen therapy, overweight  Tubes: other (see comments) (OG removed when extubated)  Oral/Mouth Cavity: tooth/teeth missing  Skin: edema, incision    Anthropometrics    Temp: 98.1 °F (36.7 °C)     Height: 5' 8" (172.7 cm)  Weight Method: Bed Scale  Weight: 92.2 kg (203 lb 4.2 oz)  Ideal Body Weight (IBW), Male: 154 lb     % Ideal Body Weight, Male (lb): 131.99 lb     BMI (Calculated): 31  BMI Grade: 30 - 34.9- obesity - grade I     Usual Body Weight (UBW), k.8 kg (admit wt)     % Usual Body Weight: 115.78  % Weight Change From Usual Weight: -15.78 %             Estimated/Assessed Needs    Weight Used For Calorie Calculations: 79.9 kg (176 lb 2.4 oz) (dosing wt)      Energy Calorie Requirements (kcal):   Energy Need Method: Berkeley-St Jeor (x 1.25 (PAL))        RMR (Berkeley-St. Jeor Equation): 1548.5        Weight Used For Protein Calculations: 79.9 kg (176 lb 2.4 oz) (dosing wt)  Protein Requirements:  gms (1.2-1.4 gms/kg dosing wt)       Fluid Need Method: RDA Method, other (see comments) (Per MD or 1 mL/kcal)        RDA Method (mL):                Assessment and Plan    Increased nutrient needs related to physiological causes s/p LVAD as evidenced by EEN/EPN.  Status: Continues     Monitor and " Evaluation    Food and Nutrient Intake: parenteral nutrition intake, energy intake  Food and Nutrient Adminstration: diet order, enteral and parenteral nutrition administration     Physical Activity and Function: nutrition-related ADLs and IADLs  Anthropometric Measurements: weight, weight change, body mass index  Biochemical Data, Medical Tests and Procedures: gastrointestinal profile, electrolyte and renal panel, glucose/endocrine profile, lipid profile, inflammatory profile  Nutrition-Focused Physical Findings: overall appearance    Nutrition Risk    Level of Risk: other (see comments) (2x/week)    Nutrition Follow-Up    RD Follow-up?: Yes

## 2017-08-15 NOTE — SUBJECTIVE & OBJECTIVE
Interval History: Patient looks much better this morning. He is able to communicate. Alert and oriented to person, place but not to time. He has been afebrile, however, has elevated WBC of 16.91 from 14.20. Blood culture drawn from the 11th has NGTD, one bottle still in process however.     Review of Systems   Unable to perform ROS: Other   Constitutional: Negative for chills and fever.   Respiratory: Negative for cough and shortness of breath.      Objective:     Vital Signs (Most Recent):  Temp: 98.2 °F (36.8 °C) (08/15/17 1100)  Pulse: 79 (08/15/17 1400)  Resp: (!) 25 (08/15/17 1400)  BP: (!) 86/0 (08/15/17 0700)  SpO2: 100 % (08/15/17 1400) Vital Signs (24h Range):  Temp:  [97.6 °F (36.4 °C)-98.9 °F (37.2 °C)] 98.2 °F (36.8 °C)  Pulse:  [79-81] 79  Resp:  [20-34] 25  SpO2:  [85 %-100 %] 100 %  BP: (72-90)/(0) 86/0  Arterial Line BP: ()/(61-80) 83/62     Weight: 92.2 kg (203 lb 4.2 oz)  Body mass index is 30.91 kg/m².    Estimated Creatinine Clearance: 35.9 mL/min (based on Cr of 2.2).    Physical Exam   Constitutional: He appears well-developed and well-nourished.   HENT:   Head: Normocephalic and atraumatic.   Eyes: Conjunctivae are normal. No scleral icterus.   Cardiovascular: Intact distal pulses.    No murmur heard.  Pulmonary/Chest: He has no wheezes. He has rales.   Abdominal: He exhibits distension. There is no tenderness. There is no rebound and no guarding.   Musculoskeletal: He exhibits edema.   L upper ext edema > R   Lymphadenopathy:     He has no cervical adenopathy.   Neurological: He is alert.   Skin: Skin is warm.       Significant Labs:   Blood Culture:   Recent Labs  Lab 08/04/17  1653 08/04/17  1654 08/09/17  0733 08/09/17  0813 08/11/17  1326   LABBLOO No growth after 5 days. No growth after 5 days. No growth after 5 days. Gram stain clement bottle: Gram negative rods   Results called to and read back by: Emma York RN 08/10/2017  11:13  ESCHERICHIA COLI ESBL No Growth to date  No  Growth to date  No Growth to date  No Growth to date     CBC:   Recent Labs  Lab 08/15/17  0000 08/15/17  0506 08/15/17  1150   WBC 14.20* 16.91* 18.15*   HGB 7.1* 7.2* 6.7*   HCT 20.5* 20.4* 19.6*   * 138* 154     CMP:   Recent Labs  Lab 08/15/17  0000 08/15/17  0506 08/15/17  1150    145  145 146*   K 3.8 3.8  3.8 4.0    109  109 111*   CO2 25 26  26 23   * 140*  140* 135*   * 126*  126* 127*   CREATININE 2.2* 2.2*  2.2* 2.2*   CALCIUM 7.5* 8.1*  8.1* 7.9*   PROT 5.0* 5.3* 5.1*   ALBUMIN 1.7* 1.7* 1.6*   BILITOT 3.7* 3.4* 2.7*   ALKPHOS 151* 159* 163*   * 210* 270*   * 124* 146*   ANIONGAP 10 10  10 12   EGFRNONAA 29.9* 29.9*  29.9* 29.9*

## 2017-08-15 NOTE — NURSING
Pt stable overnight. On 4L NC, no distress noted. No complaints of pain. No acute events with LVAD, speed set at 5100. Epi weaned at 0.02mcg/kg/min, dopamine at 5mcg/kg/min and TPN at 50.  MAP maintained 60-80. CVP 7-9, urine output 45-135cc/hr. Plan to continue to wean epi and progress pt activity. Wife at bedside, wife and pt thoroughly udpated on plan of care. Will monitor.

## 2017-08-15 NOTE — ASSESSMENT & PLAN NOTE
66 yo M PMhx non-ischemic CM on home dobutamine, HTN, HLD, s/p AICD initially presented with syncopal episodes and AICD shock, s/p LVAD placement in 7/27,now found with E Coli bacteremia and ESBL E Coli from resp cx.    - continue ertapenem  - Blood culture NGTD, still waiting for the 2nd bottle blood cx result  -Continue ertapenem for 14 days from the 08/11/17, ie: D/C Abx on the 26th unless patient continue to develop fever, elevated WBC or future growth on blood culture

## 2017-08-15 NOTE — PROGRESS NOTES
Progress Note  Surgical Intensive Care    Admit Date: 7/18/2017  Post-operative Day: 18 Days Post-Op  Hospital Day: 29    SUBJECTIVE:     Follow-up For:  Procedure(s) (LRB):  CLOSURE-STERNAL WOUND (N/A)  PLACEMENT-NEOPERICARDIUM (N/A)   S/p sternotomy closure 7/28/17    Interval history: No acute issues, No N/V, pain well controlled. No BM yesterday/overnight. On dopamine 5, epi down to 0.02. UOP adequate.       Continuous Infusions:   DOPamine 5 mcg/kg/min (08/15/17 1000)    epinephrine infusion 0.01 mcg/kg/min (08/15/17 1000)    TPN ADULT CENTRAL LINE CUSTOM 50 mL/hr at 08/15/17 1000    vasopressin (PITRESSIN) infusion Stopped (08/14/17 0800)     Scheduled Meds:   docusate sodium  50 mg Oral Daily    ertapenem (INVANZ) IVPB  1 g Intravenous Q24H    pantoprazole  40 mg Intravenous BID    polyethylene glycol  17 g Oral Daily    sodium chloride 0.9%  10 mL Intravenous Q6H     PRN Meds:sodium chloride, acetaminophen, albumin human 5%, albuterol sulfate, bisacodyl, dextrose 50%, fentaNYL, glucagon (human recombinant), hydrALAZINE, insulin aspart, omnipaque, ondansetron, Flushing PICC Protocol **AND** sodium chloride 0.9% **AND** sodium chloride 0.9%    Review of patient's allergies indicates:  No Known Allergies    OBJECTIVE:     Vital Signs (Most Recent)  Temp: 98.1 °F (36.7 °C) (08/15/17 0700)  Pulse: 79 (08/15/17 0900)  Resp: 20 (08/15/17 0900)  BP: (!) 86/0 (08/15/17 0700)  SpO2: 100 % (08/15/17 0900)    Vital Signs Range (Last 24H):  Temp:  [97.6 °F (36.4 °C)-98.9 °F (37.2 °C)]   Pulse:  [79-81]   Resp:  [20-34]   BP: (72-90)/(0)   SpO2:  [85 %-100 %]   Arterial Line BP: ()/(61-80)     I & O (Last 24H):    Intake/Output Summary (Last 24 hours) at 08/15/17 1050  Last data filed at 08/15/17 1000   Gross per 24 hour   Intake          2864.51 ml   Output             1625 ml   Net          1239.51 ml        Physical Exam   Constitutional: He appears well-developed and well-nourished. No distress. He  is alert  HENT:   Head: Normocephalic and atraumatic.    Neck: Normal range of motion. Neck supple.   Cardiovascular: Intact distal pulses.    LVAD hum present.   Pulmonary/Chest: Effort normal. No respiratory distress..   Abdominal: Soft. He exhibits no distension.   Skin: Skin is warm and dry.     Laboratory (Last 24H):  CBC:    Recent Labs  Lab 08/15/17  0506   WBC 16.91*   HGB 7.2*   HCT 20.4*   *     CMP:    Recent Labs  Lab 08/15/17  0506   CALCIUM 8.1*  8.1*   ALBUMIN 1.7*   PROT 5.3*     145   K 3.8  3.8   CO2 26  26     109   *  126*   CREATININE 2.2*  2.2*   ALKPHOS 159*   *   *   BILITOT 3.4*       ASSESSMENT/PLAN:     Neuro:  - alert and responding to commands     Resp:  - stable on NC  - Possible aspiration event causing sepsis - resp cultures growing ESBL      CV:  - HDS; LVAD numbers stable  - Dopamine, epi drips on, wean as tolerated   - Hold  given GI bleed    Heme:  - Hgb/Hct 7.2/20.4  - Continue to trend  - INR down to 2.8  - Heparin off    ID:  - afebrile  - WBC 16.91  - Likely aspiration pneumonia   - bacteremic and resp cultures ESBL+, on ertapenem  - ID consulted  - blood cx 8/11 NGTD      Renal:  - Sniger in place  - Strict I/Os   - UOP adequate, 1.6 L yesterday  - Lasix gtt at 10 mg/hr  - Cr 2.2, monitor closely     FEN/GI:  - Bed side swallow study   - Protonix BID for possible GIB  - Replace lytes PRN     Endo:  - Endocrine following  - Accuchecks  - SSI     Dispo:  - Continue ICU care    Vince Murguia PGY-1  [phone]  08/15/2017

## 2017-08-15 NOTE — PLAN OF CARE
Problem: Patient Care Overview  Goal: Plan of Care Review  Outcome: Ongoing (interventions implemented as appropriate)  Plan of care reviewed with patient and patient's spouse by Dr. Downing and KEVIN Awad RN; all questions and concerns answered appropriately. Pt remains on Dopamine IV drip and TPN IV drip. Sats 98% on 3L NC. VSS. SOBIA. LVAD speed 5100; no events. No complaints of pain. Turned Q2H per protocol; barrier applied to sacrum with turns. 1U PRBC to be given when dialysis is started; spoke with nephrology fellow and states he will place orders. Adequate UO. No BM. Pt remains NPO except sips with meds due to very faint bowel sounds and frequent belching/hiccups. See flowsheet for full assessment. Will continue to monitor patient closely.

## 2017-08-15 NOTE — PT/OT/SLP PROGRESS
Physical Therapy   partial co-Treatment with OT    Suman Hayden   MRN: [id number]   Admitting Diagnosis: Acute on chronic combined systolic and diastolic heart failure    PT Received On: 08/15/17  PT Start Time: 0916     PT Stop Time: 0946    PT Total Time (min): 30 min       Billable Minutes:  Therapeutic Activity 15 min and Therapeutic Exercise 15 min    Treatment Type: Other (see comments) (partial co-treatment with OT)  PT/PTA: PT     PTA Visit Number: 0       General Precautions: Standard, fall, LVAD, sternal  Orthopedic Precautions: N/A   Braces:      Do you have any cultural, spiritual, Episcopal conflicts, given your current situation?: none noted     Subjective:  Communicated with nurse prior to session.      Pain/Comfort  Pain Rating 1: 0/10  Pain Rating Post-Intervention 1: 0/10    Objective:   Patient found with: telemetry, arterial line, pulse ox (continuous), SCD, PICC line (CVP, LVAD)    Functional Mobility:  Bed Mobility:   Rolling/Turning Right: Total assistance (pt needed verbal cues for functional mobility with sternal precautions.)  Supine to Sit: Total Assistance (pt sat on EOB x 10 min with CGA for static sitting balance and max assist for dynamic sitting balance.)  Sit to Supine: Total Assistance    Transfers:  Sit <> Stand Assistance: Total Assistance (x 2. pt was not able to come to upright posture with standing. )  Sit <> Stand Assistive Device: No Assistive Device    Gait:   Gait Distance: not tested. pt was not able to come to full stand position.         Therapeutic Activities and Exercises:  Pt performed AAROM there exer BLE in supine x 10 reps.      AM-PAC 6 CLICK MOBILITY  How much help from another person does this patient currently need?   1 = Unable, Total/Dependent Assistance  2 = A lot, Maximum/Moderate Assistance  3 = A little, Minimum/Contact Guard/Supervision  4 = None, Modified [place]/Independent    Turning over in bed (including adjusting bedclothes, sheets and  blankets)?: 2  Sitting down on and standing up from a chair with arms (e.g., wheelchair, bedside commode, etc.): 2  Moving from lying on back to sitting on the side of the bed?: 2  Moving to and from a bed to a chair (including a wheelchair)?: 1  Need to walk in hospital room?: 1  Climbing 3-5 steps with a railing?: 1  Total Score: 9    AM-PAC Raw Score CMS G-Code Modifier Level of Impairment Assistance   6 % Total / Unable   7 - 9 CM 80 - 100% Maximal Assist   10 - 14 CL 60 - 80% Moderate Assist   15 - 19 CK 40 - 60% Moderate Assist   20 - 22 CJ 20 - 40% Minimal Assist   23 CI 1-20% SBA / CGA   24 CH 0% Independent/ Mod I     Patient left supine with all lines intact, call button in reach and wife present.    Assessment:  Suman Hayden is a 67 y.o. male with a medical diagnosis of Acute on chronic combined systolic and diastolic heart failure and presents with decreased strength, mobility, transfer, balance and decreased distance ambulated. Pt would benefit from skilled PT 6x/wk to progress physically and decrease complications from immobility. Pt is s/p LVAD HM 3 7/27, chest closure 7/28/17. Pt was re-intubated 8/9/ and extubated 8/13/17.    Rehab identified problem list/impairments: Rehab identified problem list/impairments: weakness, impaired endurance, impaired functional mobilty, gait instability, impaired balance, decreased lower extremity function    Rehab potential is good.    Activity tolerance: Good    Discharge recommendations: Discharge Facility/Level Of Care Needs: rehabilitation facility     Barriers to discharge: Barriers to Discharge: Decreased caregiver support    Equipment recommendations: Equipment Needed After Discharge:  (will determine DME needs closer to discharge. )     GOALS:    Physical Therapy Goals        Problem: Physical Therapy Goal    Goal Priority Disciplines Outcome Goal Variances Interventions   Physical Therapy Goal     PT/OT, PT Ongoing (interventions implemented as  appropriate)     Description:  Goals to be met by: 17     Patient will increase functional independence with mobility by performin. Supine to sit with MInimal Assistance- not met  2. Sit to supine with MInimal Assistance - not met  3. Sit to stand transfer with Minimal Assistance- not met  4. Bed to chair transfer with Minimal Assistance- not met  5. Gait  x 50 feet with Minimal Assistance. - not met  6. Lower extremity exercise program x15 reps, with supervision, in order to increase LE strength and (I) with functional mobility. - not met                        PLAN:    Patient to be seen 6 x/week  to address the above listed problems via gait training, therapeutic activities, therapeutic exercises  Plan of Care expires: 17  Plan of Care reviewed with: patient         Astrid DUSTY Whaley, PT  08/15/2017

## 2017-08-15 NOTE — PROGRESS NOTES
Dr. Downing and YOVANY Marques, NP at . MD updated on current IV drips, labs and pt condition. Dr. Downing stated to turn off Epinephrine IV drip. MD aware H/H 6.7/ 19.6. MD ordered 1U PRBC to be given during dialysis. MD also notified AST/ALT continue to elevate; MD ordered 2D Echo STAT and U/S abdomen. VSS. Will carry out all orders and continue to monitor patient closely.

## 2017-08-15 NOTE — NURSING
Pt stable overnight. Pt remains in paced rhythm, MAP maintained 60-80. No acute events with LVAD.1 unit PRBCs infusing for H/H 6/18 this AM. On 4L NC, denying SOB. Urine output adequate. Pt denies pain. Dr. Espinoza aware of low K and Phos this AM, awaiting replacement orders. Pt and spouse updated on POC. Refer to flowsheetsfor specific details.

## 2017-08-15 NOTE — PT/OT/SLP PROGRESS
Occupational Therapy  Treatment    Suman Hayden   MRN: [id number]   Admitting Diagnosis: Acute on chronic combined systolic and diastolic heart failure    OT Date of Treatment: 08/15/17   OT Start Time: 0915  OT Stop Time: 0941  OT Total Time (min): 26 min    Billable Minutes:  Self Care/Home Management 15 and Therapeutic Exercise 8    General Precautions: Standard, LVAD, fall, sternal, contact  Orthopedic Precautions:    Braces:           Subjective:  Communicated with RN prior to session.  Pt c/o weakness    Pain/Comfort  Pain Rating 1: 0/10  Pain Rating Post-Intervention 1: 0/10    Objective:  Patient found with: blood pressure cuff, central line, pulse ox (continuous), telemetry, PICC line, delgado catheter, oxygen     Functional Mobility:  Bed Mobility:  Rolling/Turning to Left: Total assistance  Rolling/Turning Right: Total assistance  Scooting/Bridging: Total Assistance  Supine to Sit: Total Assistance  Sit to Supine: Total Assistance    Transfers:   Sit <> Stand Assistance: Total Assistance (total A x 2 from EOB; pt unable to come fully upright)  Sit <> Stand Assistive Device: No Assistive Device  Bed <> Chair Technique:  (EOB only per RN)    Functional Ambulation: NT    Activities of Daily Living:  Feeding Level of Assistance: Activity did not occur  Feeding adaptive equipment:   UE Dressing Level of Assistance: Total assistance  UE adaptive equipment:   LE Dressing Level of Assistance: Total assistance  LE adaptive equipment:   Grooming Position: Seated, EOB  Grooming Level of Assistance: Minimum assistance  Toileting Where Assessed: Bed level  Toileting Level of Assistance: Total assistance    Therapeutic Activities and Exercises:  Pt approved for EOB only per RN; pt sat EOB x 10 min with CGA for static sitting and max A for dynamic sitting; pt gradually began falling to R requiring max A for static sitting balance; cueing provided throughout for neutral cervical alignment  Pt performed B UE AAROM Ex's in all  "planes and completed self-care as above  Pt required cueing to secure controller to self prior to transitions; sit-to-stand from EOB performed with total A x 2 and pt unable to come fully upright    AM-PAC 6 CLICK ADL   How much help from another person does this patient currently need?   1 = Unable, Total/Dependent Assistance  2 = A lot, Maximum/Moderate Assistance  3 = A little, Minimum/Contact Guard/Supervision  4 = None, Modified [place]/Independent    Putting on and taking off regular lower body clothing? : 1  Bathing (including washing, rinsing, drying)?: 1  Toileting, which includes using toilet, bedpan, or urinal? : 1  Putting on and taking off regular upper body clothing?: 1  Taking care of personal grooming such as brushing teeth?: 3  Eating meals?: 1  Total Score: 8     AM-PAC Raw Score CMS "G-Code Modifier Level of Impairment Assistance   6 % Total / Unable   7 - 8 CM 80 - 100% Maximal Assist   9-13 CL 60 - 80% Moderate Assist   14 - 19 CK 40 - 60% Moderate Assist   20 - 22 CJ 20 - 40% Minimal Assist   23 CI 1-20% SBA / CGA   24 CH 0% Independent/ Mod I       Patient left supine with all lines intact, call button in reach, RN notified and spouse present    ASSESSMENT:  Suman Hayden is a 67 y.o. male with a medical diagnosis of Acute on chronic combined systolic and diastolic heart failure and presents with profound weakness and decreased endurance s/p LVAD.    Rehab identified problem list/impairments: Rehab identified problem list/impairments: weakness, impaired endurance, impaired self care skills, impaired balance, gait instability, impaired functional mobilty, impaired cardiopulmonary response to activity, decreased upper extremity function, edema, decreased ROM, impaired fine motor    Rehab potential is good.    Activity tolerance: Fair    Discharge recommendations: Discharge Facility/Level Of Care Needs: rehabilitation facility     Barriers to discharge: Barriers to Discharge: Decreased " caregiver support (at current functional level)    Equipment recommendations:  (TBD closer to d/c)     GOALS:    Occupational Therapy Goals        Problem: Occupational Therapy Goal    Goal Priority Disciplines Outcome Interventions   Occupational Therapy Goal     OT, PT/OT Ongoing (interventions implemented as appropriate)    Description:  Goals to be met by:  2 weeks 8/28/17    Patient will increase functional independence with ADLs by performing:  Feeding: Independent   UE Dressing with Supervision.  LE Dressing with Supervision.  Grooming while standing with Supervision.  Toileting from toilet with Supervision for hygiene and clothing management.   Stand pivot transfers with Supervision.  Toilet transfer to toilet with Supervision.  Pt will be supervision  with LVAD yasmin't.                  Multidisciplinary Problems (Resolved)        Problem: Occupational Therapy Goal    Goal Priority Disciplines Outcome Interventions   Occupational Therapy Goal   (Resolved)     OT, PT/OT  Error    Description:  Goals to be met by: 8/04/2017    Patient will increase functional independence with ADLs by performing:    Increased functional strength to 5/5 for improved ADL performance.  Upper extremity exercise program and R LE ankle pumps  3x 10 reps per handout, with independence.                      Plan:  Patient to be seen 6 x/week to address the above listed problems via self-care/home management, therapeutic activities, therapeutic exercises  Plan of Care expires: 08/29/17  Plan of Care reviewed with: patient         DELMY Lucero  08/15/2017

## 2017-08-15 NOTE — PROGRESS NOTES
[facility]  Nephrology  Progress Note    Patient Name: Suman Hayden  MRN: [id number]  Admission Date: 7/18/2017  Hospital Length of Stay: 28 days  Attending Provider: Sunny Downing MD   Primary Care Physician: Joe Ernst MD  Principal Problem:Acute on chronic combined systolic and diastolic heart failure    Subjective:     HPI: Mr. Will is a 66 yo AAM with PMHx of NICM, HTN, HLD, and CKD III who presented to the hospital on 7/18 after syncopal episode at home.  He underwent LVAD placement on 7/27.  Labs that day showed a spike in his SCr to 1.5, but he quickly returned to baseline (1.2-1.3) and stayed stable until 08/01 when he increased to 1.5 and has steadily remained above baseline since, up to 2.3 on consult.  He has remained on lasix infusion with intermittent dosages of diuril to aid in diuresis.  On 08/3, there was a reported decrease in LVAD flows which resolved with decrease in lasix infusion and administration of 1 unit of PRBCs, likely causing decrease renal perfusion leading to an ischemic event.  He continues with adequate UOP on lasix gtt. He was transferred to the floor from ICU on 08/08 and was noted to become hypotensive with a doppler pressure of 58, since been requiring pressors for BP support.  Since ICU readmission he has been having increase OG tube drainage averaging around 1.5L/24h for last 2 days.  Nephrology was consulted for INDIRA and decreased UOP.    Interval History: Patient moderately responsive to questioning this am, denies any respiratory distress on nasal cannula this am.     Review of patient's allergies indicates:  No Known Allergies  Current Facility-Administered Medications   Medication Frequency    0.9%  NaCl infusion (CRRT USE ONLY) Continuous    0.9%  NaCl infusion (for blood administration) Q24H PRN    0.9%  NaCl infusion (for blood administration) Q24H PRN    acetaminophen tablet 650 mg Q6H PRN    albumin human 5% bottle 500 mL PRN    albuterol  nebulizer solution 2.5 mg Q4H PRN    bisacodyl suppository 10 mg Daily PRN    dextrose 50% injection 12.5 g PRN    docusate sodium capsule 50 mg Daily    DOPamine 400 mg in dextrose 5 % 250 mL infusion (premix) (NON-TITRATING) Continuous    EPINEPHrine (ADRENALIN) 4 mg in sodium chloride 0.9% 250 mL infusion Continuous    ertapenem (INVANZ) 1 g in sodium chloride 0.9 % 100 mL IVPB (ready to mix system) Q24H    fat emulsion 20% infusion 250 mL Daily    fentaNYL injection 50 mcg Q4H PRN    glucagon (human recombinant) injection 1 mg PRN    hydrALAZINE injection 10 mg Q6H PRN    insulin aspart pen 0-5 Units Q4H PRN    magnesium sulfate 2g in water 50mL IVPB (premix) PRN    omnipaque oral solution 15 mL PRN    ondansetron injection 4 mg Q6H PRN    pantoprazole injection 40 mg BID    polyethylene glycol packet 17 g Daily    sodium chloride 0.9% flush 10 mL Q6H    And    sodium chloride 0.9% flush 10 mL PRN    sodium phosphate 20.01 mmol in dextrose 5 % 250 mL IVPB PRN    sodium phosphate 30 mmol in dextrose 5 % 250 mL IVPB PRN    sodium phosphate 39.99 mmol in dextrose 5 % 250 mL IVPB PRN    TPN ADULT CENTRAL LINE CUSTOM Continuous    TPN ADULT CENTRAL LINE CUSTOM Continuous    vasopressin (PITRESSIN) 100 Units in dextrose 5 % 100 mL infusion Continuous       Objective:     Vital Signs (Most Recent):  Temp: 98 °F (36.7 °C) (08/15/17 1500)  Pulse: 100 (08/15/17 1800)  Resp: (!) 24 (08/15/17 1800)  BP: (!) 84/0 (08/15/17 1500)  SpO2: 100 % (08/15/17 1800)  O2 Device (Oxygen Therapy): nasal cannula (08/15/17 0829) Vital Signs (24h Range):  Temp:  [98 °F (36.7 °C)-98.9 °F (37.2 °C)] 98 °F (36.7 °C)  Pulse:  [] 100  Resp:  [20-34] 24  SpO2:  [85 %-100 %] 100 %  BP: (78-90)/(0) 84/0  Arterial Line BP: ()/(61-80) 95/64     Weight: 92.2 kg (203 lb 4.2 oz) (08/15/17 1000)  Body mass index is 30.91 kg/m².  Body surface area is 2.1 meters squared.    I/O last 3 completed shifts:  In: 3778.3  [I.V.:1492; Blood:350]  Out: 2530 [Urine:2530]    Physical Exam   Constitutional: He appears well-developed and well-nourished.   Cardiovascular: Exam reveals no gallop and no friction rub.    No murmur heard.  Mechanical device sounds   Pulmonary/Chest: Effort normal and breath sounds normal. No respiratory distress. He has no wheezes. He has no rales.   Abdominal: Soft. There is no tenderness.   Musculoskeletal: He exhibits edema.   Neurological: He is alert.   Skin: Skin is warm and dry. No rash noted.   Vitals reviewed.      Significant Labs:   Reviewed    Significant Imaging:  Reviewed    Assessment/Plan:     Acute renal failure with acute tubular necrosis superimposed on stage 3 chronic kidney disease    Patient with nonoliguric INDIRA, likely ischemic (v. septic ATN) from renal hypoperfusion given cardiomyopathy.  Patient creatinine has remained stable, has maintained relative balance in I/O's, do not suspect elevated BUN indicative of uremia (elevation likely secondary to TPN administration and possible blood loss, agree with PRBC).  Discussed with primary team (Dr. Downing), requesting renal replacement therapy for clearance only, will plan for SLED, 6hours, no net UF to prevent any hemodynamic instability that may lead to further renal injury, will continue to monitor closely.  Patient/wife voiced understanding of above.                David Sal MD  Nephrology  [facility]

## 2017-08-15 NOTE — PROCEDURES
TXP LVAD INTERROGATIONS 8/15/2017 8/15/2017 8/15/2017 8/15/2017 8/15/2017 8/15/2017 8/15/2017   Type HeartMate3 HeartMate3 HeartMate3 HeartMate3 HeartMate3 HeartMate3 HeartMate3   Flow 3.9 3.9 3.9 3.8 3.9 3.9 4.0   Speed 5100 5100 5100 5100 5100 5150 5100   PI 4.2 4.2 3.8 4.1 3.6 3.8 3.6   Power ([place]) 3.5 3.4 3.4 3.4 3.5 3.4 3.5   LSL - - - 4700 - - -   Pulsatility - - - Pulse - - -   Some recent data might be hidden     LSL 4700  No alarms noted in history  HCT 20.4  intermittant pulsatile    Pt AAAO, wife asleep on sofa.  Will continue with VAD education this week.  Pt verbalized he has no questions at this time.

## 2017-08-16 LAB
ALBUMIN SERPL BCP-MCNC: 1.7 G/DL
ALBUMIN SERPL BCP-MCNC: 1.8 G/DL
ALBUMIN SERPL BCP-MCNC: 2 G/DL
ALLENS TEST: ABNORMAL
ALLENS TEST: NORMAL
ALP SERPL-CCNC: 210 U/L
ALP SERPL-CCNC: 233 U/L
ALP SERPL-CCNC: 233 U/L
ALP SERPL-CCNC: 247 U/L
ALP SERPL-CCNC: 265 U/L
ALT SERPL W/O P-5'-P-CCNC: 227 U/L
ALT SERPL W/O P-5'-P-CCNC: 241 U/L
ALT SERPL W/O P-5'-P-CCNC: 259 U/L
ALT SERPL W/O P-5'-P-CCNC: 259 U/L
ALT SERPL W/O P-5'-P-CCNC: 273 U/L
ANION GAP SERPL CALC-SCNC: 10 MMOL/L
ANION GAP SERPL CALC-SCNC: 12 MMOL/L
ANION GAP SERPL CALC-SCNC: 12 MMOL/L
ANION GAP SERPL CALC-SCNC: 9 MMOL/L
ANION GAP SERPL CALC-SCNC: 9 MMOL/L
ANISOCYTOSIS BLD QL SMEAR: SLIGHT
APTT BLDCRRT: 34.9 SEC
AST SERPL-CCNC: 313 U/L
AST SERPL-CCNC: 437 U/L
AST SERPL-CCNC: 463 U/L
AST SERPL-CCNC: 520 U/L
AST SERPL-CCNC: 520 U/L
BACTERIA BLD CULT: NORMAL
BASOPHILS # BLD AUTO: 0.03 K/UL
BASOPHILS # BLD AUTO: 0.04 K/UL
BASOPHILS # BLD AUTO: ABNORMAL K/UL
BASOPHILS NFR BLD: 0 %
BASOPHILS NFR BLD: 0.2 %
BILIRUB DIRECT SERPL-MCNC: 2.1 MG/DL
BILIRUB DIRECT SERPL-MCNC: 2.5 MG/DL
BILIRUB SERPL-MCNC: 2.5 MG/DL
BILIRUB SERPL-MCNC: 2.7 MG/DL
BILIRUB SERPL-MCNC: 3 MG/DL
BILIRUB SERPL-MCNC: 3.1 MG/DL
BILIRUB SERPL-MCNC: 3.1 MG/DL
BLD PROD TYP BPU: NORMAL
BLOOD UNIT EXPIRATION DATE: NORMAL
BLOOD UNIT TYPE CODE: 5100
BLOOD UNIT TYPE: NORMAL
BNP SERPL-MCNC: 2542 PG/ML
BUN SERPL-MCNC: 121 MG/DL
BUN SERPL-MCNC: 121 MG/DL
BUN SERPL-MCNC: 122 MG/DL
BUN SERPL-MCNC: 64 MG/DL
BUN SERPL-MCNC: 94 MG/DL
BURR CELLS BLD QL SMEAR: ABNORMAL
CALCIUM SERPL-MCNC: 7.9 MG/DL
CALCIUM SERPL-MCNC: 8.2 MG/DL
CALCIUM SERPL-MCNC: 8.2 MG/DL
CALCIUM SERPL-MCNC: 8.3 MG/DL
CALCIUM SERPL-MCNC: 8.4 MG/DL
CHLORIDE SERPL-SCNC: 109 MMOL/L
CHLORIDE SERPL-SCNC: 110 MMOL/L
CHLORIDE SERPL-SCNC: 111 MMOL/L
CO2 SERPL-SCNC: 21 MMOL/L
CO2 SERPL-SCNC: 24 MMOL/L
CO2 SERPL-SCNC: 24 MMOL/L
CO2 SERPL-SCNC: 27 MMOL/L
CO2 SERPL-SCNC: 27 MMOL/L
CODING SYSTEM: NORMAL
CREAT SERPL-MCNC: 1.1 MG/DL
CREAT SERPL-MCNC: 1.6 MG/DL
CREAT SERPL-MCNC: 2.1 MG/DL
CRP SERPL-MCNC: 98.1 MG/L
DELSYS: ABNORMAL
DELSYS: NORMAL
DIFFERENTIAL METHOD: ABNORMAL
DISPENSE STATUS: NORMAL
EOSINOPHIL # BLD AUTO: 0.2 K/UL
EOSINOPHIL # BLD AUTO: ABNORMAL K/UL
EOSINOPHIL NFR BLD: 0.9 %
EOSINOPHIL NFR BLD: 1.2 %
EOSINOPHIL NFR BLD: 4 %
ERYTHROCYTE [DISTWIDTH] IN BLOOD BY AUTOMATED COUNT: 16.1 %
ERYTHROCYTE [DISTWIDTH] IN BLOOD BY AUTOMATED COUNT: 16.1 %
ERYTHROCYTE [DISTWIDTH] IN BLOOD BY AUTOMATED COUNT: 16.6 %
ERYTHROCYTE [DISTWIDTH] IN BLOOD BY AUTOMATED COUNT: 16.6 %
ERYTHROCYTE [DISTWIDTH] IN BLOOD BY AUTOMATED COUNT: 16.7 %
ERYTHROCYTE [SEDIMENTATION RATE] IN BLOOD BY WESTERGREN METHOD: 28 MM/H
EST. GFR  (AFRICAN AMERICAN): 36.6 ML/MIN/1.73 M^2
EST. GFR  (AFRICAN AMERICAN): 50.8 ML/MIN/1.73 M^2
EST. GFR  (AFRICAN AMERICAN): >60 ML/MIN/1.73 M^2
EST. GFR  (NON AFRICAN AMERICAN): 31.6 ML/MIN/1.73 M^2
EST. GFR  (NON AFRICAN AMERICAN): 43.9 ML/MIN/1.73 M^2
EST. GFR  (NON AFRICAN AMERICAN): >60 ML/MIN/1.73 M^2
ESTIMATED PA SYSTOLIC PRESSURE: 32.16
FIO2: 100
FIO2: 36
FLOW: 14
FLOW: 4
FLOW: 8
GIANT PLATELETS BLD QL SMEAR: PRESENT
GLUCOSE SERPL-MCNC: 112 MG/DL
GLUCOSE SERPL-MCNC: 122 MG/DL
GLUCOSE SERPL-MCNC: 134 MG/DL
GLUCOSE SERPL-MCNC: 140 MG/DL
GLUCOSE SERPL-MCNC: 140 MG/DL
HCO3 UR-SCNC: 21.2 MMOL/L (ref 24–28)
HCO3 UR-SCNC: 23.6 MMOL/L (ref 24–28)
HCO3 UR-SCNC: 24.3 MMOL/L (ref 24–28)
HCO3 UR-SCNC: 26.5 MMOL/L (ref 24–28)
HCT VFR BLD AUTO: 19.4 %
HCT VFR BLD AUTO: 21.7 %
HCT VFR BLD AUTO: 22.7 %
HGB BLD-MCNC: 6.6 G/DL
HGB BLD-MCNC: 7.5 G/DL
HGB BLD-MCNC: 7.8 G/DL
HGB BLD-MCNC: 8 G/DL
HGB BLD-MCNC: 8 G/DL
HYPOCHROMIA BLD QL SMEAR: ABNORMAL
INR PPP: 2.4
LDH SERPL L TO P-CCNC: 0.63 MMOL/L (ref 0.36–1.25)
LDH SERPL L TO P-CCNC: 547 U/L
LYMPHOCYTES # BLD AUTO: 1.7 K/UL
LYMPHOCYTES # BLD AUTO: 1.8 K/UL
LYMPHOCYTES # BLD AUTO: 1.8 K/UL
LYMPHOCYTES # BLD AUTO: 1.9 K/UL
LYMPHOCYTES # BLD AUTO: ABNORMAL K/UL
LYMPHOCYTES NFR BLD: 11 %
LYMPHOCYTES NFR BLD: 7.6 %
LYMPHOCYTES NFR BLD: 9 %
LYMPHOCYTES NFR BLD: 9 %
LYMPHOCYTES NFR BLD: 9.6 %
MAGNESIUM SERPL-MCNC: 2.4 MG/DL
MCH RBC QN AUTO: 27.5 PG
MCH RBC QN AUTO: 27.9 PG
MCH RBC QN AUTO: 27.9 PG
MCH RBC QN AUTO: 28 PG
MCH RBC QN AUTO: 28.5 PG
MCHC RBC AUTO-ENTMCNC: 34 G/DL
MCHC RBC AUTO-ENTMCNC: 34.4 G/DL
MCHC RBC AUTO-ENTMCNC: 34.6 G/DL
MCHC RBC AUTO-ENTMCNC: 35.2 G/DL
MCHC RBC AUTO-ENTMCNC: 35.2 G/DL
MCV RBC AUTO: 79 FL
MCV RBC AUTO: 79 FL
MCV RBC AUTO: 81 FL
MCV RBC AUTO: 81 FL
MCV RBC AUTO: 83 FL
MITRAL VALVE MOBILITY: NORMAL
MODE: ABNORMAL
MODE: NORMAL
MONOCYTES # BLD AUTO: 1.1 K/UL
MONOCYTES # BLD AUTO: 1.3 K/UL
MONOCYTES # BLD AUTO: ABNORMAL K/UL
MONOCYTES NFR BLD: 4 %
MONOCYTES NFR BLD: 5.5 %
MONOCYTES NFR BLD: 5.9 %
MYELOCYTES NFR BLD MANUAL: 1 %
NEUTROPHILS # BLD AUTO: 16.4 K/UL
NEUTROPHILS # BLD AUTO: 16.8 K/UL
NEUTROPHILS # BLD AUTO: 16.8 K/UL
NEUTROPHILS # BLD AUTO: 18.9 K/UL
NEUTROPHILS # BLD AUTO: ABNORMAL K/UL
NEUTROPHILS NFR BLD: 75 %
NEUTROPHILS NFR BLD: 83.5 %
NEUTROPHILS NFR BLD: 84.4 %
NEUTROPHILS NFR BLD: 84.4 %
NEUTROPHILS NFR BLD: 85.4 %
NEUTS BAND NFR BLD MANUAL: 5 %
NRBC BLD-RTO: ABNORMAL /100 WBC
OVALOCYTES BLD QL SMEAR: ABNORMAL
PCO2 BLDA: 31.4 MMHG (ref 35–45)
PCO2 BLDA: 32.7 MMHG (ref 35–45)
PCO2 BLDA: 34.4 MMHG (ref 35–45)
PCO2 BLDA: 34.9 MMHG (ref 35–45)
PH SMN: 7.44 [PH] (ref 7.35–7.45)
PH SMN: 7.45 [PH] (ref 7.35–7.45)
PH SMN: 7.47 [PH] (ref 7.35–7.45)
PH SMN: 7.5 [PH] (ref 7.35–7.45)
PHOSPHATE SERPL-MCNC: 2.9 MG/DL
PLATELET # BLD AUTO: 149 K/UL
PLATELET # BLD AUTO: 151 K/UL
PLATELET # BLD AUTO: 155 K/UL
PLATELET BLD QL SMEAR: ABNORMAL
PMV BLD AUTO: 12.4 FL
PMV BLD AUTO: 12.8 FL
PMV BLD AUTO: 13 FL
PO2 BLDA: 118 MMHG (ref 80–100)
PO2 BLDA: 138 MMHG (ref 80–100)
PO2 BLDA: 162 MMHG (ref 80–100)
PO2 BLDA: 173 MMHG (ref 80–100)
POC BE: -3 MMOL/L
POC BE: 0 MMOL/L
POC BE: 0 MMOL/L
POC BE: 3 MMOL/L
POC SATURATED O2: 100 % (ref 95–100)
POC SATURATED O2: 100 % (ref 95–100)
POC SATURATED O2: 99 % (ref 95–100)
POC SATURATED O2: 99 % (ref 95–100)
POC TCO2: 22 MMOL/L (ref 23–27)
POC TCO2: 25 MMOL/L (ref 23–27)
POC TCO2: 25 MMOL/L (ref 23–27)
POC TCO2: 28 MMOL/L (ref 23–27)
POCT GLUCOSE: 101 MG/DL (ref 70–110)
POCT GLUCOSE: 120 MG/DL (ref 70–110)
POCT GLUCOSE: 125 MG/DL (ref 70–110)
POCT GLUCOSE: 126 MG/DL (ref 70–110)
POCT GLUCOSE: 141 MG/DL (ref 70–110)
POCT GLUCOSE: 142 MG/DL (ref 70–110)
POCT GLUCOSE: 153 MG/DL (ref 70–110)
POCT GLUCOSE: 157 MG/DL (ref 70–110)
POIKILOCYTOSIS BLD QL SMEAR: SLIGHT
POLYCHROMASIA BLD QL SMEAR: ABNORMAL
POTASSIUM SERPL-SCNC: 3.9 MMOL/L
POTASSIUM SERPL-SCNC: 3.9 MMOL/L
POTASSIUM SERPL-SCNC: 4 MMOL/L
POTASSIUM SERPL-SCNC: 4 MMOL/L
POTASSIUM SERPL-SCNC: 4.1 MMOL/L
PREALB SERPL-MCNC: 8 MG/DL
PROT SERPL-MCNC: 5.3 G/DL
PROT SERPL-MCNC: 5.4 G/DL
PROTHROMBIN TIME: 24 SEC
PROVIDER CREDENTIALS: ABNORMAL
PROVIDER CREDENTIALS: NORMAL
PROVIDER NOTIFIED: ABNORMAL
PROVIDER NOTIFIED: NORMAL
RBC # BLD AUTO: 2.4 M/UL
RBC # BLD AUTO: 2.63 M/UL
RBC # BLD AUTO: 2.79 M/UL
RBC # BLD AUTO: 2.87 M/UL
RBC # BLD AUTO: 2.87 M/UL
RETIRED EF AND QEF - SEE NOTES: 10 (ref 55–65)
SAMPLE: ABNORMAL
SAMPLE: NORMAL
SCHISTOCYTES BLD QL SMEAR: ABNORMAL
SITE: ABNORMAL
SITE: NORMAL
SODIUM SERPL-SCNC: 142 MMOL/L
SODIUM SERPL-SCNC: 145 MMOL/L
SODIUM SERPL-SCNC: 146 MMOL/L
SP02: 55
SPHEROCYTES BLD QL SMEAR: ABNORMAL
SPHEROCYTES BLD QL SMEAR: ABNORMAL
TARGETS BLD QL SMEAR: ABNORMAL
TIME NOTIFIED: 1558
TIME NOTIFIED: 1604
TRANS ERYTHROCYTES VOL PATIENT: NORMAL ML
TRICUSPID VALVE REGURGITATION: ABNORMAL
TRIGL SERPL-MCNC: 105 MG/DL
VERBAL RESULT READBACK PERFORMED: YES
VERBAL RESULT READBACK PERFORMED: YES
WBC # BLD AUTO: 14.9 K/UL
WBC # BLD AUTO: 19.91 K/UL
WBC # BLD AUTO: 20.11 K/UL
WBC # BLD AUTO: 20.11 K/UL
WBC # BLD AUTO: 22.52 K/UL

## 2017-08-16 PROCEDURE — 82248 BILIRUBIN DIRECT: CPT

## 2017-08-16 PROCEDURE — P9045 ALBUMIN (HUMAN), 5%, 250 ML: HCPCS | Performed by: STUDENT IN AN ORGANIZED HEALTH CARE EDUCATION/TRAINING PROGRAM

## 2017-08-16 PROCEDURE — 83615 LACTATE (LD) (LDH) ENZYME: CPT

## 2017-08-16 PROCEDURE — 27000248 HC VAD-ADDITIONAL DAY

## 2017-08-16 PROCEDURE — 86920 COMPATIBILITY TEST SPIN: CPT

## 2017-08-16 PROCEDURE — 82803 BLOOD GASES ANY COMBINATION: CPT

## 2017-08-16 PROCEDURE — P9021 RED BLOOD CELLS UNIT: HCPCS

## 2017-08-16 PROCEDURE — 93306 TTE W/DOPPLER COMPLETE: CPT

## 2017-08-16 PROCEDURE — 85025 COMPLETE CBC W/AUTO DIFF WBC: CPT | Mod: 91

## 2017-08-16 PROCEDURE — 85610 PROTHROMBIN TIME: CPT

## 2017-08-16 PROCEDURE — 94799 UNLISTED PULMONARY SVC/PX: CPT

## 2017-08-16 PROCEDURE — 27000221 HC OXYGEN, UP TO 24 HOURS

## 2017-08-16 PROCEDURE — 63600175 PHARM REV CODE 636 W HCPCS: Performed by: STUDENT IN AN ORGANIZED HEALTH CARE EDUCATION/TRAINING PROGRAM

## 2017-08-16 PROCEDURE — 37799 UNLISTED PX VASCULAR SURGERY: CPT

## 2017-08-16 PROCEDURE — 25000003 PHARM REV CODE 250: Performed by: STUDENT IN AN ORGANIZED HEALTH CARE EDUCATION/TRAINING PROGRAM

## 2017-08-16 PROCEDURE — 80053 COMPREHEN METABOLIC PANEL: CPT

## 2017-08-16 PROCEDURE — 84134 ASSAY OF PREALBUMIN: CPT

## 2017-08-16 PROCEDURE — 87070 CULTURE OTHR SPECIMN AEROBIC: CPT

## 2017-08-16 PROCEDURE — 36415 COLL VENOUS BLD VENIPUNCTURE: CPT

## 2017-08-16 PROCEDURE — 25000003 PHARM REV CODE 250: Performed by: THORACIC SURGERY (CARDIOTHORACIC VASCULAR SURGERY)

## 2017-08-16 PROCEDURE — 86850 RBC ANTIBODY SCREEN: CPT

## 2017-08-16 PROCEDURE — 93750 INTERROGATION VAD IN PERSON: CPT | Performed by: THORACIC SURGERY (CARDIOTHORACIC VASCULAR SURGERY)

## 2017-08-16 PROCEDURE — 99233 SBSQ HOSP IP/OBS HIGH 50: CPT | Mod: 24,,, | Performed by: THORACIC SURGERY (CARDIOTHORACIC VASCULAR SURGERY)

## 2017-08-16 PROCEDURE — 85347 COAGULATION TIME ACTIVATED: CPT

## 2017-08-16 PROCEDURE — 93750 INTERROGATION VAD IN PERSON: CPT | Mod: ,,, | Performed by: THORACIC SURGERY (CARDIOTHORACIC VASCULAR SURGERY)

## 2017-08-16 PROCEDURE — 84100 ASSAY OF PHOSPHORUS: CPT

## 2017-08-16 PROCEDURE — C9113 INJ PANTOPRAZOLE SODIUM, VIA: HCPCS | Performed by: STUDENT IN AN ORGANIZED HEALTH CARE EDUCATION/TRAINING PROGRAM

## 2017-08-16 PROCEDURE — A4216 STERILE WATER/SALINE, 10 ML: HCPCS | Performed by: THORACIC SURGERY (CARDIOTHORACIC VASCULAR SURGERY)

## 2017-08-16 PROCEDURE — 85007 BL SMEAR W/DIFF WBC COUNT: CPT

## 2017-08-16 PROCEDURE — 83880 ASSAY OF NATRIURETIC PEPTIDE: CPT

## 2017-08-16 PROCEDURE — 85730 THROMBOPLASTIN TIME PARTIAL: CPT

## 2017-08-16 PROCEDURE — 84478 ASSAY OF TRIGLYCERIDES: CPT

## 2017-08-16 PROCEDURE — 20000000 HC ICU ROOM

## 2017-08-16 PROCEDURE — 83605 ASSAY OF LACTIC ACID: CPT

## 2017-08-16 PROCEDURE — 80053 COMPREHEN METABOLIC PANEL: CPT | Mod: 91

## 2017-08-16 PROCEDURE — 99233 SBSQ HOSP IP/OBS HIGH 50: CPT | Mod: ,,, | Performed by: INTERNAL MEDICINE

## 2017-08-16 PROCEDURE — 99900035 HC TECH TIME PER 15 MIN (STAT)

## 2017-08-16 PROCEDURE — 80076 HEPATIC FUNCTION PANEL: CPT

## 2017-08-16 PROCEDURE — 97110 THERAPEUTIC EXERCISES: CPT

## 2017-08-16 PROCEDURE — 83735 ASSAY OF MAGNESIUM: CPT

## 2017-08-16 PROCEDURE — 99233 SBSQ HOSP IP/OBS HIGH 50: CPT | Mod: ,,, | Performed by: SURGERY

## 2017-08-16 PROCEDURE — 85027 COMPLETE CBC AUTOMATED: CPT | Mod: 91

## 2017-08-16 PROCEDURE — 90945 DIALYSIS ONE EVALUATION: CPT

## 2017-08-16 PROCEDURE — 86900 BLOOD TYPING SEROLOGIC ABO: CPT

## 2017-08-16 PROCEDURE — 86140 C-REACTIVE PROTEIN: CPT

## 2017-08-16 PROCEDURE — 93306 TTE W/DOPPLER COMPLETE: CPT | Mod: 26,,, | Performed by: INTERNAL MEDICINE

## 2017-08-16 PROCEDURE — 90945 DIALYSIS ONE EVALUATION: CPT | Mod: ,,, | Performed by: INTERNAL MEDICINE

## 2017-08-16 RX ORDER — ALBUMIN HUMAN 50 G/1000ML
12.5 SOLUTION INTRAVENOUS ONCE
Status: COMPLETED | OUTPATIENT
Start: 2017-08-16 | End: 2017-08-16

## 2017-08-16 RX ORDER — ALBUMIN HUMAN 50 G/1000ML
25 SOLUTION INTRAVENOUS ONCE
Status: COMPLETED | OUTPATIENT
Start: 2017-08-16 | End: 2017-08-16

## 2017-08-16 RX ADMIN — CALCIUM GLUCONATE: 94 INJECTION, SOLUTION INTRAVENOUS at 10:08

## 2017-08-16 RX ADMIN — ALTEPLASE 2 MG: 2.2 INJECTION, POWDER, LYOPHILIZED, FOR SOLUTION INTRAVENOUS at 08:08

## 2017-08-16 RX ADMIN — Medication 10 ML: at 12:08

## 2017-08-16 RX ADMIN — PANTOPRAZOLE SODIUM 40 MG: 40 INJECTION, POWDER, FOR SOLUTION INTRAVENOUS at 09:08

## 2017-08-16 RX ADMIN — DOPAMINE HYDROCHLORIDE IN DEXTROSE 5 MCG/KG/MIN: 1.6 INJECTION, SOLUTION INTRAVENOUS at 10:08

## 2017-08-16 RX ADMIN — Medication 10 ML: at 05:08

## 2017-08-16 RX ADMIN — ERTAPENEM SODIUM 1 G: 1 INJECTION, POWDER, LYOPHILIZED, FOR SOLUTION INTRAMUSCULAR; INTRAVENOUS at 09:08

## 2017-08-16 RX ADMIN — ALBUMIN (HUMAN) 25 G: 12.5 SOLUTION INTRAVENOUS at 10:08

## 2017-08-16 RX ADMIN — ACETAMINOPHEN 650 MG: 325 TABLET ORAL at 12:08

## 2017-08-16 RX ADMIN — PANTOPRAZOLE SODIUM 40 MG: 40 INJECTION, POWDER, FOR SOLUTION INTRAVENOUS at 08:08

## 2017-08-16 RX ADMIN — ALBUMIN (HUMAN) 12.5 G: 12.5 SOLUTION INTRAVENOUS at 09:08

## 2017-08-16 NOTE — PROGRESS NOTES
"Called to bedside for hematemesis. Per RN, pt complained of "pain all over" before episode of emesis, now states he feels much better. Has not had a bowel movement in several days despite some suppositories. Pt on CRRT yet line is clotting off, was only on for 1 hour. Receiving 1 u pRBCs, Hg 6.6 this morning.  TPN infusing, dopamine gtt at 5 mcg/kg/min.    Vitals:    08/16/17 0106   BP:    Pulse: 105   Resp: (!) 33   Temp:      NAD  RRR with some PVCs, VAD flow4.0, speed 5100, PI 4.4   Slight increased work of breathing, now on Venti mask, ABG wnl  Abd: There is about 30 cc dark red fluid in canister from emesis.   Faint bowel sounds. His abdomen is distended but soft, not tender to palpation.  Pt is oriented to person and place.    A/P  Small episode hematemesis. Repeat CBC. Obtain CXR/KUB. Called nephrology for CRRT. Aspiration precautions. Per notes, pt had episode about a week ago when he was emergently reintubated and had a distended abdomen with large angélica colored NG tube output. Will continue to follow.  "

## 2017-08-16 NOTE — PROGRESS NOTES
Dr. Downing and Dr. Espinoza at . NGT placed; 100 ml thick dark red blood output noted. GI consulted. Dr. Espinoza also notified UO decreasing over past 3 hours. UO this hour 10 ml. No new orders given. VSS. Will continue to monitor patient closely.

## 2017-08-16 NOTE — TREATMENT PLAN
GI Note    Called regarding patient Catracho, 68yo male with recent LVAD placement and recurrent episodes of emesis who had 1 episode of small volume hematemesis last night.  NG today put out 100cc of dark fluid.    No melena/hematochezia    Hb 7.8 [Baseline 6.6-8 over previous 6 days]  INR 2.4 [peaked at 7.3 on 8/10]    Hemodynamically stable    Recommendations:  Continue PPI BID  Limit NG suction in the setting of elevated INR  GI will continue to follow along closely and reevaluate for potential EGD if overt bleeding recurs.       Natalio Lambert   Gastroenterology Fellow PGY VI  [phone]

## 2017-08-16 NOTE — PROGRESS NOTES
Daily E and M and VAD Interrogation Note    Reason for Visit:  Patient is seen in follow up for management of:  [] HeartMate II  [] Heartware [] Total artificial heart       [] ECMO           [x] Other - HM III     Interval History:  [] Interval history unobtainable due to intubation.  The [x] implant/[] explant date was 7/27/17    Events - One episode of hematemesis overnight ~100 cc. CRRT started around midnight - clotted off after 2 hours. Pt with no complains this AM; denies nausea.            Review of Systems:   Negative except as above.      Medications:  Current Facility-Administered Medications   Medication Dose Route Frequency Provider Last Rate Last Dose    0.9%  NaCl infusion (CRRT USE ONLY)   Intravenous Continuous David Sal MD        albumin human 5% bottle 500 mL  500 mL Intravenous PRN Shayne Espinoza MD   500 mL at 08/09/17 0700    albuterol nebulizer solution 2.5 mg  2.5 mg Nebulization Q4H PRN Shayne Espinoza MD        bisacodyl suppository 10 mg  10 mg Rectal Daily PRN Shayne Espinoza MD   10 mg at 08/06/17 2036    dextrose 50% injection 12.5 g  12.5 g Intravenous PRN Charbel Ritter MD        docusate sodium capsule 50 mg  50 mg Oral Daily Shayne Espinoza MD   50 mg at 08/15/17 0835    DOPamine 400 mg in dextrose 5 % 250 mL infusion (premix) (NON-TITRATING)  5 mcg/kg/min (Dosing Weight) Intravenous Continuous Shayne Espinoza MD 15 mL/hr at 08/16/17 1023 5 mcg/kg/min at 08/16/17 1023    EPINEPHrine (ADRENALIN) 4 mg in sodium chloride 0.9% 250 mL infusion  0.01 mcg/kg/min (Dosing Weight) Intravenous Continuous Shayne Espinoza MD   Stopped at 08/15/17 1500    ertapenem (INVANZ) 1 g in sodium chloride 0.9 % 100 mL IVPB (ready to mix system)  1 g Intravenous Q24H Edwige Clay  mL/hr at 08/15/17 2105 1 g at 08/15/17 2105    fentaNYL injection 50 mcg  50 mcg Intravenous Q4H PRN Shayne Espinoza MD   50 mcg at 08/13/17 1310    glucagon (human  recombinant) injection 1 mg  1 mg Intramuscular PRN Charbel Ritter MD        insulin aspart pen 0-5 Units  0-5 Units Subcutaneous Q4H PRN Charbel Ritter MD   2 Units at 08/10/17 0751    magnesium sulfate 2g in water 50mL IVPB (premix)  2 g Intravenous PRN David Sal MD        ondansetron injection 4 mg  4 mg Intravenous Q6H PRN Shayne Espinoza MD   4 mg at 08/08/17 2112    pantoprazole injection 40 mg  40 mg Intravenous BID Shayne Espinoza MD   40 mg at 08/16/17 0811    polyethylene glycol packet 17 g  17 g Oral Daily Shayne Espinoza MD   Stopped at 08/16/17 0900    sodium chloride 0.9% flush 10 mL  10 mL Intravenous Q6H Sunny Downing MD   10 mL at 08/16/17 0541    And    sodium chloride 0.9% flush 10 mL  10 mL Intravenous PRN Sunny Downing MD   10 mL at 08/10/17 1151    sodium phosphate 20.01 mmol in dextrose 5 % 250 mL IVPB  20.01 mmol Intravenous PRN David Sal MD        sodium phosphate 30 mmol in dextrose 5 % 250 mL IVPB  30 mmol Intravenous PRN David Sal MD        sodium phosphate 39.99 mmol in dextrose 5 % 250 mL IVPB  39.99 mmol Intravenous PRN David Sal MD        TPN ADULT CENTRAL LINE CUSTOM   Intravenous Continuous Shayne Espinoza MD 50 mL/hr at 08/16/17 0900      TPN ADULT CENTRAL LINE CUSTOM   Intravenous Continuous Vince Murguia MD         Physical Examination:  Vital Signs:   Vitals:    08/16/17 1100   BP:    Pulse: 99   Resp: (!) 22   Temp: 98.5 °F (36.9 °C)     Cardiovascular:  [x] Regular rate and rhythm [] Irregular []  None (MATT) []  Other  []  No edema [x]  Edema present  [x]  Clear to auscultation  []  Rales to []  Coarse  []  No rales but   [] Pedal Pulses absent  [x]  Pulses  + throughout  Skin:  Incision is [x]  Clean, dry and intact.  []  Other   Sternum:  [x]  Stable []  Unstable  Driveline(s):   [x]  Clean, dry and intact. []  Other     Labs:  BMP  Lab Results   Component Value Date     08/16/2017      08/16/2017    K 4.0 08/16/2017    K 4.0 08/16/2017     08/16/2017     08/16/2017    CO2 24 08/16/2017    CO2 24 08/16/2017     (H) 08/16/2017     (H) 08/16/2017    CREATININE 2.1 (H) 08/16/2017    CREATININE 2.1 (H) 08/16/2017    CALCIUM 8.2 (L) 08/16/2017    CALCIUM 8.2 (L) 08/16/2017    ANIONGAP 12 08/16/2017    ANIONGAP 12 08/16/2017    ESTGFRAFRICA 36.6 (A) 08/16/2017    ESTGFRAFRICA 36.6 (A) 08/16/2017    EGFRNONAA 31.6 (A) 08/16/2017    EGFRNONAA 31.6 (A) 08/16/2017     Magnesium   Date Value Ref Range Status   08/16/2017 2.4 1.6 - 2.6 mg/dL Final     Lab Results   Component Value Date    WBC 20.11 (H) 08/16/2017    WBC 20.11 (H) 08/16/2017    HGB 8.0 (L) 08/16/2017    HGB 8.0 (L) 08/16/2017    HCT 22.7 (L) 08/16/2017    HCT 22.7 (L) 08/16/2017    MCV 79 (L) 08/16/2017    MCV 79 (L) 08/16/2017     (L) 08/16/2017     (L) 08/16/2017     Lab Results   Component Value Date    INR 2.4 (H) 08/16/2017    INR 2.8 (H) 08/15/2017    INR 4.1 (H) 08/14/2017     BNP   Date Value Ref Range Status   08/16/2017 2,542 (H) 0 - 99 pg/mL Final     Comment:     Values of less than 100 pg/ml are consistent with non-CHF populations.   08/14/2017 2,873 (H) 0 - 99 pg/mL Final     Comment:     Values of less than 100 pg/ml are consistent with non-CHF populations.   08/11/2017 2,609 (H) 0 - 99 pg/mL Final     Comment:     Values of less than 100 pg/ml are consistent with non-CHF populations.     LD   Date Value Ref Range Status   08/16/2017 547 (H) 110 - 260 U/L Final     Comment:     Results are increased in hemolyzed samples.   08/15/2017 375 (H) 110 - 260 U/L Final     Comment:     Results are increased in hemolyzed samples.   08/14/2017 279 (H) 110 - 260 U/L Final     Comment:     Results are increased in hemolyzed samples.     X-Rays:  [x]  I reviewed today's Chest x-ray    Procedure:  Device Interrogation including analysis of device parameters.  Current Settings   [x]  Ventricular Assist  Device  []  Total Artificial Heart interrogated  Review of device function is [x]  Stable []  Other   TXP LVAD INTERROGATIONS 8/16/2017 8/16/2017 8/16/2017 8/16/2017 8/16/2017 8/16/2017 8/16/2017   Type HeartMate3 HeartMate3 HeartMate3 HeartMate3 HeartMate3 HeartMate3 HeartMate3   Flow 4.3 4.3 4.1 4.1 4.1 4.1 4.1   Speed 5100 5100 5100 5100 5100 5100 5100   PI 2.7 2.7 3.3 3.3 3.3 3.3 3.4   Power ([place]) 3.4 3.4 3.5 3.4 3.4 3.4 3.4   LSL - - - 4700 - - -   Pulsatility - - - Pulse - - -   Some recent data might be hidden       Assessment:  [x]  Primary Cardiomyopathy []  Congestive Heart Failure   []  Atrial Fibrillation []  Ventricular Tachycardia   []  Aftercare cardiac device []  Long term (current) use of anticoagulants   []  Ventilator-associated pneumonia []  Pneumonia viral, unspecified   []  Pneumonia, bacterial, unspecified []  Pneumonia, organism unspecified   []  Hemorrhage of GI tract, unspecified    []  Nosebleed []  Driveline infection   []  Infection VAD device []  New onset of    []        Plan:  [x]  Interval history obtained from ICU attending team member during rounding today  []  VAD/MATT teaching performed with patient  []  Mobilization / Physical Therapy ongoing  []  Anticoagulation []  Ongoing []  Held  []  Studies ordered  []   To OR today for closure.       Total time spent was 30 minutes.  Of which more than 50 percent of the care dominated counseling and coordinating care with different team members. The VAD was interrogated and all parameters were WNL and no significant findings were found in the history. All these findings are documented in the note above.    Neuro:  - Alert and oriented  - Wean sedation as tolerated     Resp:  - Extubated  - Resp cultures with ESBL - Ertapenem started per ID   - Minimize supplemental O2  - Pulmonary toilet    CV:  - HDS; LVAD numbers stable  -   - Dobutamine off  - Dopamine at 5  - Vaso and epi weaned off  - Hold  given GI bleed  - F/U echo -  possible LVAD speed increase      Heme:  - Hgb/Hct stable after 1 u PRBC last ngiht  - Continue to trend  - INR 2.4 this AM   - Ccoumadin at 2 mg tonight  - Heparin off    ID:  - afebrile  - Ertapenem started for ESBL pos resp culture  - WBC increased to 20  - Will remove PICC line and culture tip   - Appreciate ID recs  - continue to monitor     Renal:  - Singer in place  - Strict I/Os   - Adequate UOP  - Lasix gtt off  -  this AM - restart CRRT    FEN/GI:  - Strict NPO  - Bowel reg  - Protonix 40 BID  - Replace lytes PRN  - Ileus - gen surg consult  - Continue TPN    Endo:  - Endocrine following, appreciate assistance  - Accuchecks  - Insulin ssi    Dispo:  - Continue ICU care.       Shayne Espinoza MD  General Surgery PGY-2  Pager: [phone]    Cardiac Surgery Attending E and M (VAD) Note along with VAD Interrogation    I have seen and examined the patient and agree with the findings above    I also reviewed the patients clinical course and:  [x]  Hemodynamic & Respiratory paramters  [x]  Laboratory Data  [x]  Radiological studies     VAD Interrogated [x]      VAD Function is normal. Changes made []  None [x]        Interrogation of Ventricular assist device was performed with physician analysis of device parameters and review of device function. I have personally reviewed the interrogation findings and agree with findings as stated

## 2017-08-16 NOTE — PROGRESS NOTES
[facility]  Heart Transplant  Progress Note    Patient Name: Suman Hayden  MRN: [id number]  Admission Date: 7/18/2017  Hospital Length of Stay: 29 days  Attending Physician: Sunny Downing MD  Primary Care Provider: Joe Ernst MD  Principal Problem:Acute on chronic combined systolic and diastolic heart failure    Subjective:     Interval History:   Patient lethargic this morning.  Reports feeling ok.  Has no new complaints     Continuous Infusions:   sodium chloride 0.9%      DOPamine 5 mcg/kg/min (08/16/17 1300)    epinephrine infusion Stopped (08/15/17 1500)    TPN ADULT CENTRAL LINE CUSTOM 50 mL/hr at 08/16/17 1300    TPN ADULT CENTRAL LINE CUSTOM       Scheduled Meds:   docusate sodium  50 mg Oral Daily    ertapenem (INVANZ) IVPB  1 g Intravenous Q24H    pantoprazole  40 mg Intravenous BID    polyethylene glycol  17 g Oral Daily    sodium chloride 0.9%  10 mL Intravenous Q6H     PRN Meds:albumin human 5%, albuterol sulfate, bisacodyl, dextrose 50%, fentaNYL, glucagon (human recombinant), insulin aspart, magnesium sulfate IVPB, ondansetron, Flushing PICC Protocol **AND** sodium chloride 0.9% **AND** sodium chloride 0.9%, sodium phosphate IVPB, sodium phosphate IVPB, sodium phosphate IVPB    Review of patient's allergies indicates:  No Known Allergies  Objective:     Vital Signs (Most Recent):  Temp: 98.5 °F (36.9 °C) (08/16/17 1100)  Pulse: 106 (08/16/17 1300)  Resp: (!) 25 (08/16/17 1300)  BP: (!) 80/0 (08/16/17 0300)  SpO2: 100 % (08/16/17 1300) Vital Signs (24h Range):  Temp:  [98 °F (36.7 °C)-100 °F (37.8 °C)] 98.5 °F (36.9 °C)  Pulse:  [] 106  Resp:  [10-38] 25  SpO2:  [95 %-100 %] 100 %  BP: (72-84)/(0) 80/0  Arterial Line BP: ()/(46-80) 74/59     Weight: 93.3 kg (205 lb 11 oz)  Body mass index is 31.27 kg/m².      Intake/Output Summary (Last 24 hours) at 08/16/17 1448  Last data filed at 08/16/17 1300   Gross per 24 hour   Intake           2212.2 ml   Output              2347 ml   Net           -134.8 ml       Hemodynamic Parameters:       Telemetry:     Physical Exam   Constitutional: He appears well-developed and well-nourished.   HENT:   Head: Normocephalic and atraumatic.   Eyes: EOM are normal. Pupils are equal, round, and reactive to light.   Cardiovascular: Normal rate, regular rhythm and normal heart sounds.  Exam reveals no gallop and no friction rub.    No murmur heard.  + LVAD hum    Pulmonary/Chest: Effort normal. No respiratory distress. He has no wheezes. He has no rales.   Abdominal: Soft. Bowel sounds are normal.   Musculoskeletal:   LUE Edema    Neurological: He is alert.   Nursing note and vitals reviewed.      Significant Labs:  CBC:    Recent Labs  Lab 08/16/17  0405 08/16/17  1145   WBC 20.11*  20.11*  --    RBC 2.87*  2.87* 2.79*   HGB 8.0*  8.0* 7.8*   HCT 22.7*  22.7* 22.7*   *  149*  --    MCV 79*  79* 81*   MCH 27.9  27.9 28.0   MCHC 35.2  35.2 34.4     BNP:    Recent Labs  Lab 08/16/17  0405   BNP 2,542*     CMP:    Recent Labs  Lab 08/16/17  1145   *   CALCIUM 8.4*   ALBUMIN 1.8*   PROT 5.4*      K 3.9   CO2 27      BUN 94*   CREATININE 1.6*   ALKPHOS 265*   *   *   BILITOT 2.7*      Coagulation:     Recent Labs  Lab 08/16/17  0405   INR 2.4*   APTT 34.9*     LDH:    Recent Labs  Lab 08/14/17  0420 08/15/17  0506 08/16/17  0405   * 375* 547*     Microbiology:  Microbiology Results (last 7 days)     Procedure Component Value Units Date/Time    Blood culture [[id number]] Collected:  08/11/17 1326    Order Status:  Completed Specimen:  Blood from Peripheral, Forearm, Left Updated:  08/15/17 1812     Blood Culture, Routine No Growth to date     Blood Culture, Routine No Growth to date     Blood Culture, Routine No Growth to date     Blood Culture, Routine No Growth to date     Blood Culture, Routine No Growth to date    Blood culture [[id number]] Collected:  08/09/17 0733    Order Status:  Completed  Specimen:  Blood from Peripheral, Antecubital, Left Updated:  08/14/17 1022     Blood Culture, Routine No growth after 5 days.    Blood culture [[id number]]  (Susceptibility) Collected:  08/09/17 0813    Order Status:  Completed Specimen:  Blood from Line, Arterial, Right Updated:  08/12/17 1113     Blood Culture, Routine Gram stain clement bottle: Gram negative rods      Blood Culture, Routine Results called to and read back by: Emma York RN 08/10/2017  11:13     Blood Culture, Routine ESCHERICHIA COLI ESBL    Blood culture [[id number]] Collected:  08/11/17 1326    Order Status:  Sent Specimen:  Blood from Peripheral, Forearm, Left Updated:  08/11/17 1327    Culture, Respiratory with Gram Stain [[id number]]  (Susceptibility) Collected:  08/09/17 0723    Order Status:  Completed Specimen:  Respiratory from Endotracheal Aspirate Updated:  08/11/17 1153     Respiratory Culture --     ESCHERICHIA COLI ESBL  Many       Gram Stain (Respiratory) <10 epithelial cells per low power field.     Gram Stain (Respiratory) Few WBC's     Gram Stain (Respiratory) Many Gram negative rods     Gram Stain (Respiratory) Few Gram positive rods     Gram Stain (Respiratory) Few Gram positive cocci    Urine culture [[id number]] Collected:  08/09/17 0725    Order Status:  Completed Specimen:  Urine from Urine, Catheterized Updated:  08/10/17 1128     Urine Culture, Routine No growth    Blood culture [[id number]] Collected:  08/04/17 1653    Order Status:  Completed Specimen:  Blood from Peripheral, Wrist, Right Updated:  08/09/17 2012     Blood Culture, Routine No growth after 5 days.    Blood culture [[id number]] Collected:  08/04/17 1654    Order Status:  Completed Specimen:  Blood from Peripheral, Wrist, Left Updated:  08/09/17 2012     Blood Culture, Routine No growth after 5 days.          I have reviewed all pertinent labs within the past 24 hours.    Estimated Creatinine Clearance: 49.7 mL/min (based on Cr of 1.6).      Assessment and  Plan:     LVAD (left ventricular assist device) present    - LVAD HM III. Placed this admit 7/27/17  - CTS Primary  - chest closure (7/28/17) and extubated (7/29/17)  -Reintubated 8/9/17 and extubated 8/13/17  -Now on Dopamine, Epi, and off Vaso this am  - Speed 5100  - LDH stable  - INR supratherpeutic. AC per CTS                    Bacteremia    -ESBL from blood culture 8/9   -Would repeat blood cultures   -Ertapenem ( MERRITT < 0.5). ID following        V-tach    - Rec restarting Amio        Hyperlipidemia    - Rec resuming pravastatin once liver enzymes stabilize         Hepatitis B core antibody positive since 2012    - will defer liver biopsy as CTS not requiring it  - ID consult cleared the patient for sx  - case discussed with hepatology, low suspicion for liver involvement        Atrial fibrillation    -AC, Amio per C TS          Atrial tachycardia    - [id number] - 3  -Rec restarting Amio. AC per CTS        AICD discharge    - appropriate in the setting of VT aggravated by underlying AT/AFL (earlier during the admission)  - 7/28 - setting of AF undersense - device reprogrammed to VVI 80  - tachy therapy turned off  - Rec restarting Amio  - EP planning to do lead revision             MELISSA Jon  Heart Transplant spectralink: [id number]  [facility]

## 2017-08-16 NOTE — PLAN OF CARE
Problem: Patient Care Overview  Goal: Plan of Care Review  Outcome: Ongoing (interventions implemented as appropriate)  Plan of care reviewed with patient and patient's spouse by Dr. Downing, Dr. Espinoza and KEVIN Awad RN; all questions and concerns answered appropriately. Pt remains on Dopamine IV drip and TPN IV drip. Sats 100% on 4L NC. VSS. SOBIA. Pt remains NPO. NGT placed today to LIS; tolerating well. No BM and faint bowel sounds; MD aware. UO decreasing; Dr. Espinoza aware. CRRT x8 hours; tolerated well. GI consulted. Turned Q2H per protocol; barrier applied to sacrum. LVAD Speed increased to 5200; tolerating well. No issues with LVAD throughout shift. No reports of pain. See flowsheet for full assessment. Will continue to monitor patient closely.

## 2017-08-16 NOTE — PROGRESS NOTES
Progress Note  Surgical Intensive Care    Admit Date: 7/18/2017  Post-operative Day: 19 Days Post-Op  Hospital Day: 30    SUBJECTIVE:     Follow-up For:  Procedure(s) (LRB):  CLOSURE-STERNAL WOUND (N/A)  PLACEMENT-NEOPERICARDIUM (N/A)   S/p sternotomy closure 7/28/17    Interval history: Hematemesis x1 overnight, CRRT ran for 2 hours then clotted off. Given 1U pRBCs for H/H of 6.6/19.4. No BM yesterday/overnight. On dopamine 5, epi off. UOP adequate 50-75 cc/hr. Pain well controlled      Continuous Infusions:   sodium chloride 0.9%      DOPamine 5 mcg/kg/min (08/16/17 1300)    epinephrine infusion Stopped (08/15/17 1500)    TPN ADULT CENTRAL LINE CUSTOM 50 mL/hr at 08/16/17 1200    TPN ADULT CENTRAL LINE CUSTOM       Scheduled Meds:   docusate sodium  50 mg Oral Daily    ertapenem (INVANZ) IVPB  1 g Intravenous Q24H    pantoprazole  40 mg Intravenous BID    polyethylene glycol  17 g Oral Daily    sodium chloride 0.9%  10 mL Intravenous Q6H     PRN Meds:albumin human 5%, albuterol sulfate, bisacodyl, dextrose 50%, fentaNYL, glucagon (human recombinant), insulin aspart, magnesium sulfate IVPB, ondansetron, Flushing PICC Protocol **AND** sodium chloride 0.9% **AND** sodium chloride 0.9%, sodium phosphate IVPB, sodium phosphate IVPB, sodium phosphate IVPB    Review of patient's allergies indicates:  No Known Allergies    OBJECTIVE:     Vital Signs (Most Recent)  Temp: 98.5 °F (36.9 °C) (08/16/17 1100)  Pulse: 106 (08/16/17 1300)  Resp: (!) 25 (08/16/17 1300)  BP: (!) 80/0 (08/16/17 0300)  SpO2: 100 % (08/16/17 1300)    Vital Signs Range (Last 24H):  Temp:  [98 °F (36.7 °C)-100 °F (37.8 °C)]   Pulse:  []   Resp:  [10-38]   BP: (72-84)/(0)   SpO2:  [95 %-100 %]   Arterial Line BP: ()/(46-80)     I & O (Last 24H):    Intake/Output Summary (Last 24 hours) at 08/16/17 1321  Last data filed at 08/16/17 1100   Gross per 24 hour   Intake           2212.2 ml   Output             1824 ml   Net             388.2 ml        Physical Exam    Constitutional: He appears well-developed and well-nourished. No distress. He is alert  HENT:   Head: Normocephalic and atraumatic.    Neck: Normal range of motion. Neck supple.   Cardiovascular: Intact distal pulses.    LVAD hum present.   Pulmonary/Chest: Effort normal. No respiratory distress..   Abdominal: Soft. He exhibits no distension.   Skin: Skin is warm and dry.     Laboratory (Last 24H):  CBC:    Recent Labs  Lab 08/16/17  0405   WBC 20.11*  20.11*   HGB 8.0*  8.0*   HCT 22.7*  22.7*   *  149*     CMP:    Recent Labs  Lab 08/16/17  0405   CALCIUM 8.2*  8.2*   ALBUMIN 1.7*  1.7*   PROT 5.3*  5.3*     145   K 4.0  4.0   CO2 24  24     109   *  121*   CREATININE 2.1*  2.1*   ALKPHOS 233*  233*   *  259*   *  520*   BILITOT 3.1*  3.1*       ASSESSMENT/PLAN:     Neuro:  - alert and responding to commands  - sedation off    Resp:  - stable on NC  - Possible aspiration event causing sepsis - resp cultures growing ESBL   - wean supplemental O2 as tolerated     CV:  - HDS; LVAD numbers stable  - Dopamine 5  - epi and levo off  - Hold  given GI bleed  -f/u Echo    Heme:  - Hgb/Hct 7.2/20.4  - Continue to trend  - INR down to 2.4  - Heparin off    ID:  - afebrile  - WBC 20.1  - Likely aspiration pneumonia   - bacteremic and resp cultures ESBL+, on ertapenem  - ID consulted  - blood cx 8/11 NGTD      Renal:  - Singer in place  - Strict I/Os   - UOP adequate  - Cr 2.2, monitor closely  - CRRT     FEN/GI:  - Protonix BID for possible GIB  - Replace lytes PRN     Endo:  - Endocrine following  - Accuchecks  - SSI     Dispo:  - Continue ICU care  - wean drips as tolerated    Vince Murguia PGY-1  [phone]  08/16/2017

## 2017-08-16 NOTE — PT/OT/SLP PROGRESS
Occupational Therapy      Suman Hayden  MRN: [id number]    Patient not seen today secondary to  (pt with shallow breathind on CRRT which is clotting off frequently, thus RN approved bed activity only - PT to follow today). Will follow-up 8/17/17.    DELMY Lucero  8/16/2017

## 2017-08-16 NOTE — PROGRESS NOTES
While in pt's room, pt started vomiting bright red output ~100 mL. Immediately suctioned pt's mouth and increased NC flow. Dr. Soni notified. ABG ordered and obtained. Will continue to monitor pt closely.

## 2017-08-16 NOTE — SUBJECTIVE & OBJECTIVE
Interval History:   Patient lethargic this morning.  Reports feeling ok.  Has no new complaints     Continuous Infusions:   sodium chloride 0.9%      DOPamine 5 mcg/kg/min (08/16/17 1300)    epinephrine infusion Stopped (08/15/17 1500)    TPN ADULT CENTRAL LINE CUSTOM 50 mL/hr at 08/16/17 1300    TPN ADULT CENTRAL LINE CUSTOM       Scheduled Meds:   docusate sodium  50 mg Oral Daily    ertapenem (INVANZ) IVPB  1 g Intravenous Q24H    pantoprazole  40 mg Intravenous BID    polyethylene glycol  17 g Oral Daily    sodium chloride 0.9%  10 mL Intravenous Q6H     PRN Meds:albumin human 5%, albuterol sulfate, bisacodyl, dextrose 50%, fentaNYL, glucagon (human recombinant), insulin aspart, magnesium sulfate IVPB, ondansetron, Flushing PICC Protocol **AND** sodium chloride 0.9% **AND** sodium chloride 0.9%, sodium phosphate IVPB, sodium phosphate IVPB, sodium phosphate IVPB    Review of patient's allergies indicates:  No Known Allergies  Objective:     Vital Signs (Most Recent):  Temp: 98.5 °F (36.9 °C) (08/16/17 1100)  Pulse: 106 (08/16/17 1300)  Resp: (!) 25 (08/16/17 1300)  BP: (!) 80/0 (08/16/17 0300)  SpO2: 100 % (08/16/17 1300) Vital Signs (24h Range):  Temp:  [98 °F (36.7 °C)-100 °F (37.8 °C)] 98.5 °F (36.9 °C)  Pulse:  [] 106  Resp:  [10-38] 25  SpO2:  [95 %-100 %] 100 %  BP: (72-84)/(0) 80/0  Arterial Line BP: ()/(46-80) 74/59     Weight: 93.3 kg (205 lb 11 oz)  Body mass index is 31.27 kg/m².      Intake/Output Summary (Last 24 hours) at 08/16/17 1448  Last data filed at 08/16/17 1300   Gross per 24 hour   Intake           2212.2 ml   Output             2347 ml   Net           -134.8 ml       Hemodynamic Parameters:       Telemetry:     Physical Exam   Constitutional: He appears well-developed and well-nourished.   HENT:   Head: Normocephalic and atraumatic.   Eyes: EOM are normal. Pupils are equal, round, and reactive to light.   Cardiovascular: Normal rate, regular rhythm and normal heart  sounds.  Exam reveals no gallop and no friction rub.    No murmur heard.  + LVAD hum    Pulmonary/Chest: Effort normal. No respiratory distress. He has no wheezes. He has no rales.   Abdominal: Soft. Bowel sounds are normal.   Musculoskeletal:   LUE Edema    Neurological: He is alert.   Nursing note and vitals reviewed.      Significant Labs:  CBC:    Recent Labs  Lab 08/16/17  0405 08/16/17  1145   WBC 20.11*  20.11*  --    RBC 2.87*  2.87* 2.79*   HGB 8.0*  8.0* 7.8*   HCT 22.7*  22.7* 22.7*   *  149*  --    MCV 79*  79* 81*   MCH 27.9  27.9 28.0   MCHC 35.2  35.2 34.4     BNP:    Recent Labs  Lab 08/16/17  0405   BNP 2,542*     CMP:    Recent Labs  Lab 08/16/17  1145   *   CALCIUM 8.4*   ALBUMIN 1.8*   PROT 5.4*      K 3.9   CO2 27      BUN 94*   CREATININE 1.6*   ALKPHOS 265*   *   *   BILITOT 2.7*      Coagulation:     Recent Labs  Lab 08/16/17  0405   INR 2.4*   APTT 34.9*     LDH:    Recent Labs  Lab 08/14/17  0420 08/15/17  0506 08/16/17  0405   * 375* 547*     Microbiology:  Microbiology Results (last 7 days)     Procedure Component Value Units Date/Time    Blood culture [[id number]] Collected:  08/11/17 1326    Order Status:  Completed Specimen:  Blood from Peripheral, Forearm, Left Updated:  08/15/17 1812     Blood Culture, Routine No Growth to date     Blood Culture, Routine No Growth to date     Blood Culture, Routine No Growth to date     Blood Culture, Routine No Growth to date     Blood Culture, Routine No Growth to date    Blood culture [[id number]] Collected:  08/09/17 0733    Order Status:  Completed Specimen:  Blood from Peripheral, Antecubital, Left Updated:  08/14/17 1022     Blood Culture, Routine No growth after 5 days.    Blood culture [[id number]]  (Susceptibility) Collected:  08/09/17 0813    Order Status:  Completed Specimen:  Blood from Line, Arterial, Right Updated:  08/12/17 1113     Blood Culture, Routine Gram stain clement bottle: Gram  negative rods      Blood Culture, Routine Results called to and read back by: Emma York RN 08/10/2017  11:13     Blood Culture, Routine ESCHERICHIA COLI ESBL    Blood culture [[id number]] Collected:  08/11/17 1326    Order Status:  Sent Specimen:  Blood from Peripheral, Forearm, Left Updated:  08/11/17 1327    Culture, Respiratory with Gram Stain [[id number]]  (Susceptibility) Collected:  08/09/17 0723    Order Status:  Completed Specimen:  Respiratory from Endotracheal Aspirate Updated:  08/11/17 1153     Respiratory Culture --     ESCHERICHIA COLI ESBL  Many       Gram Stain (Respiratory) <10 epithelial cells per low power field.     Gram Stain (Respiratory) Few WBC's     Gram Stain (Respiratory) Many Gram negative rods     Gram Stain (Respiratory) Few Gram positive rods     Gram Stain (Respiratory) Few Gram positive cocci    Urine culture [[id number]] Collected:  08/09/17 0725    Order Status:  Completed Specimen:  Urine from Urine, Catheterized Updated:  08/10/17 1128     Urine Culture, Routine No growth    Blood culture [[id number]] Collected:  08/04/17 1653    Order Status:  Completed Specimen:  Blood from Peripheral, Wrist, Right Updated:  08/09/17 2012     Blood Culture, Routine No growth after 5 days.    Blood culture [[id number]] Collected:  08/04/17 1654    Order Status:  Completed Specimen:  Blood from Peripheral, Wrist, Left Updated:  08/09/17 2012     Blood Culture, Routine No growth after 5 days.          I have reviewed all pertinent labs within the past 24 hours.    Estimated Creatinine Clearance: 49.7 mL/min (based on Cr of 1.6).

## 2017-08-16 NOTE — PT/OT/SLP PROGRESS
Physical Therapy  Treatment    Suman Hayden   MRN: [id number]   Admitting Diagnosis: Acute on chronic combined systolic and diastolic heart failure    PT Received On: 08/16/17  PT Start Time: 1040     PT Stop Time: 1059    PT Total Time (min): 19 min       Billable Minutes:  Therapeutic Exercise 19 min    Treatment Type: Treatment  PT/PTA: PT     PTA Visit Number: 0       General Precautions: Standard, fall, sternal, LVAD  Orthopedic Precautions: N/A   Braces:      Do you have any cultural, spiritual, Methodist conflicts, given your current situation?: none noted     Subjective:  Communicated with nurse prior to session.      Pain/Comfort  Pain Rating 1: 0/10  Pain Rating Post-Intervention 1: 0/10    Objective:   Patient found with: telemetry, arterial line, pulse ox (continuous), SCD, oxygen (LVAD, CVP, B Rich Renita boots, R IJ dialysis catheter.)    Functional Mobility:  Bed Mobility:   Rolling/Turning to Left:  (not tested. pt on CRRT and it was positionally sensitive to clotting. RN approved ther exer in bed.)    Transfers:  Sit <> Stand Assistance:  (not tested. see above)    Gait:   Gait Distance: not tested. see above    Therapeutic Activities and Exercises:  Pt performed AAROM there exer BLE and LUE in supine x 15 reps. No there exer RUE 2nd to IJ dialysis catheter and possibility of it clotting off.      AM-PAC 6 CLICK MOBILITY  How much help from another person does this patient currently need?   1 = Unable, Total/Dependent Assistance  2 = A lot, Maximum/Moderate Assistance  3 = A little, Minimum/Contact Guard/Supervision  4 = None, Modified [place]/Independent    Turning over in bed (including adjusting bedclothes, sheets and blankets)?: 2  Sitting down on and standing up from a chair with arms (e.g., wheelchair, bedside commode, etc.): 2  Moving from lying on back to sitting on the side of the bed?: 2  Moving to and from a bed to a chair (including a wheelchair)?: 1  Need to walk in hospital room?:  1  Climbing 3-5 steps with a railing?: 1  Total Score: 9    AM-PAC Raw Score CMS G-Code Modifier Level of Impairment Assistance   6 % Total / Unable   7 - 9 CM 80 - 100% Maximal Assist   10 - 14 CL 60 - 80% Moderate Assist   15 - 19 CK 40 - 60% Moderate Assist   20 - 22 CJ 20 - 40% Minimal Assist   23 CI 1-20% SBA / CGA   24 CH 0% Independent/ Mod I     Patient left supine with all lines intact, call button in reach and wife. present.    Assessment:  Suman Hayden is a 67 y.o. male with a medical diagnosis of Acute on chronic combined systolic and diastolic heart failure and presents with decreased strength, mobility, balance, transfers and decreased distance ambulated. Pt would benefit from cont PT to address deficits and will need inpt rehab placement to maximize rehab potential. Pt will benefit from skilled PT 6x/wk to decrease complications from immobility and progress pt physically. Pt is s/p LVAD HM3 placement 7/27, chest closure 7/28, re-intubation 8/9, extubation 8/13/17..    Rehab identified problem list/impairments: Rehab identified problem list/impairments: weakness, impaired endurance, impaired functional mobilty, impaired balance, decreased lower extremity function    Rehab potential is fair.    Activity tolerance: Fair    Discharge recommendations: Discharge Facility/Level Of Care Needs: rehabilitation facility     Barriers to discharge: Barriers to Discharge: Decreased caregiver support (family will not be able to assist at current functional level. )    Equipment recommendations: Equipment Needed After Discharge:  (will determine DME needs closer to discharge. )     GOALS:    Physical Therapy Goals        Problem: Physical Therapy Goal    Goal Priority Disciplines Outcome Goal Variances Interventions   Physical Therapy Goal     PT/OT, PT Ongoing (interventions implemented as appropriate)     Description:  Goals to be met by: 9/12/17     Patient will increase functional independence with  mobility by performin. Supine to sit with MInimal Assistance- not met  2. Sit to supine with MInimal Assistance - not met  3. Sit to stand transfer with Minimal Assistance- not met  4. Bed to chair transfer with Minimal Assistance- not met  5. Gait  x 50 feet with Minimal Assistance. - not met  6. Lower extremity exercise program x15 reps, with supervision, in order to increase LE strength and (I) with functional mobility. - not met                        PLAN:    Patient to be seen 6 x/week  to address the above listed problems via gait training, therapeutic activities, therapeutic exercises  Plan of Care expires: 17  Plan of Care reviewed with: patient, spouse         Astrid MONTANO Jovana, PT  2017

## 2017-08-16 NOTE — PROGRESS NOTES
LVAD dressing changed per pt's wife, Kia. I educated pt's wife on sterile technique. She demonstrated understanding of sterile technique and did not break sterile field. LVAD site CDI with no redness or swelling. Driveline secured to abdomen with no kinks noted. Will continue to monitor.

## 2017-08-16 NOTE — PLAN OF CARE
Problem: Patient Care Overview  Goal: Plan of Care Review  Outcome: Ongoing (interventions implemented as appropriate)  VSS at this time. VAD speed remains @ 5100, no VAD alarms. 1 episode of hematemesis overnight, see progress note for details. CRRT ran for around 2 hours but kept clotting off, nephrology paged per Dr. Soni. 1 unit PRBCs given with increase in H/H with this AM labs. Pt follows commands, disoriented to time and place. Venti mask in place, 8 L 40%, SpO2 100%. UO 50-75 mL/hr. Dopamine remains. TPN and Lipids infusing. Accucheck q4h with no supplemental insulin required. Doppler pulses correlate Art line, all pulses Dopplerable. Turned q2h. No BMs. Wife remains at bedside, POC reviewed with pt and wife, all questions and concerns addressed. See flowsheets for details, will continue to monitor.

## 2017-08-16 NOTE — ASSESSMENT & PLAN NOTE
-ESBL from blood culture 8/9   -Would repeat blood cultures   -Ertapenem ( MERRITT < 0.5). ID following

## 2017-08-16 NOTE — PROGRESS NOTES
Dr. Espinoza called to bs due to MAP in 50's after being started on CRRT. CRRT paused and MD ordered Albumin 5% 500 ml IV over 4 hours. Will carry out order and continue to monitor patient closely.

## 2017-08-16 NOTE — PT/OT/SLP PROGRESS
Speech Language Pathology      Suman Hayden  MRN: [id number]    Attempted to see pt this am. MD team present.  Nursing reports pt not appropriate for ongoing swallow assessment /po trials at time of attempt.  Pt with episode of hematemesis overnight.  ST will continue to follow.      SHERRI Diane, CCC-SLP  Speech Language Pathologist  [phone]    8/16/2017

## 2017-08-17 ENCOUNTER — ANESTHESIA EVENT (OUTPATIENT)
Dept: ENDOSCOPY | Facility: HOSPITAL | Age: 67
DRG: 001 | End: 2017-08-17
Payer: MEDICARE

## 2017-08-17 ENCOUNTER — ANESTHESIA (OUTPATIENT)
Dept: ENDOSCOPY | Facility: HOSPITAL | Age: 67
DRG: 001 | End: 2017-08-17
Payer: MEDICARE

## 2017-08-17 VITALS — RESPIRATION RATE: 23 BRPM

## 2017-08-17 PROBLEM — K92.2 UPPER GI BLEED: Status: ACTIVE | Noted: 2017-08-17

## 2017-08-17 LAB
ABO + RH BLD: NORMAL
ALBUMIN SERPL BCP-MCNC: 1.8 G/DL
ALBUMIN SERPL BCP-MCNC: 1.9 G/DL
ALBUMIN SERPL BCP-MCNC: 2 G/DL
ALBUMIN SERPL BCP-MCNC: 2.1 G/DL
ALLENS TEST: ABNORMAL
ALP SERPL-CCNC: 239 U/L
ALP SERPL-CCNC: 240 U/L
ALP SERPL-CCNC: 250 U/L
ALP SERPL-CCNC: 251 U/L
ALT SERPL W/O P-5'-P-CCNC: 134 U/L
ALT SERPL W/O P-5'-P-CCNC: 145 U/L
ALT SERPL W/O P-5'-P-CCNC: 178 U/L
ALT SERPL W/O P-5'-P-CCNC: 192 U/L
ANION GAP SERPL CALC-SCNC: 10 MMOL/L
ANION GAP SERPL CALC-SCNC: 11 MMOL/L
ANION GAP SERPL CALC-SCNC: 13 MMOL/L
ANISOCYTOSIS BLD QL SMEAR: ABNORMAL
ANISOCYTOSIS BLD QL SMEAR: ABNORMAL
ANISOCYTOSIS BLD QL SMEAR: SLIGHT
APTT BLDCRRT: 31.7 SEC
AST SERPL-CCNC: 118 U/L
AST SERPL-CCNC: 140 U/L
AST SERPL-CCNC: 188 U/L
AST SERPL-CCNC: 225 U/L
BASO STIPL BLD QL SMEAR: ABNORMAL
BASOPHILS # BLD AUTO: 0.01 K/UL
BASOPHILS # BLD AUTO: 0.02 K/UL
BASOPHILS # BLD AUTO: 0.03 K/UL
BASOPHILS # BLD AUTO: ABNORMAL K/UL
BASOPHILS NFR BLD: 0.1 %
BASOPHILS NFR BLD: 0.5 %
BASOPHILS NFR BLD: 1 %
BASOPHILS NFR BLD: 1 %
BILIRUB SERPL-MCNC: 2.5 MG/DL
BILIRUB SERPL-MCNC: 2.5 MG/DL
BILIRUB SERPL-MCNC: 2.8 MG/DL
BILIRUB SERPL-MCNC: 3.4 MG/DL
BLD GP AB SCN CELLS X3 SERPL QL: NORMAL
BLD PROD TYP BPU: NORMAL
BLOOD UNIT EXPIRATION DATE: NORMAL
BLOOD UNIT TYPE CODE: 5100
BLOOD UNIT TYPE: NORMAL
BUN SERPL-MCNC: 103 MG/DL
BUN SERPL-MCNC: 79 MG/DL
BUN SERPL-MCNC: 83 MG/DL
BUN SERPL-MCNC: 83 MG/DL
BUN SERPL-MCNC: 92 MG/DL
CALCIUM SERPL-MCNC: 8.1 MG/DL
CALCIUM SERPL-MCNC: 8.2 MG/DL
CHLORIDE SERPL-SCNC: 109 MMOL/L
CHLORIDE SERPL-SCNC: 109 MMOL/L
CHLORIDE SERPL-SCNC: 110 MMOL/L
CHLORIDE SERPL-SCNC: 110 MMOL/L
CHLORIDE SERPL-SCNC: 111 MMOL/L
CO2 SERPL-SCNC: 22 MMOL/L
CO2 SERPL-SCNC: 22 MMOL/L
CO2 SERPL-SCNC: 23 MMOL/L
CO2 SERPL-SCNC: 24 MMOL/L
CO2 SERPL-SCNC: 24 MMOL/L
CODING SYSTEM: NORMAL
CREAT SERPL-MCNC: 1.5 MG/DL
CREAT SERPL-MCNC: 1.7 MG/DL
CREAT SERPL-MCNC: 1.7 MG/DL
CREAT SERPL-MCNC: 1.8 MG/DL
CREAT SERPL-MCNC: 1.9 MG/DL
DELSYS: ABNORMAL
DIFFERENTIAL METHOD: ABNORMAL
DISPENSE STATUS: NORMAL
DOHLE BOD BLD QL SMEAR: PRESENT
DOHLE BOD BLD QL SMEAR: PRESENT
EOSINOPHIL # BLD AUTO: 0.1 K/UL
EOSINOPHIL # BLD AUTO: 0.2 K/UL
EOSINOPHIL # BLD AUTO: 0.2 K/UL
EOSINOPHIL # BLD AUTO: ABNORMAL K/UL
EOSINOPHIL NFR BLD: 0.7 %
EOSINOPHIL NFR BLD: 0.8 %
EOSINOPHIL NFR BLD: 0.8 %
EOSINOPHIL NFR BLD: 1 %
ERYTHROCYTE [DISTWIDTH] IN BLOOD BY AUTOMATED COUNT: 16.6 %
ERYTHROCYTE [DISTWIDTH] IN BLOOD BY AUTOMATED COUNT: 16.7 %
ERYTHROCYTE [DISTWIDTH] IN BLOOD BY AUTOMATED COUNT: 16.7 %
ERYTHROCYTE [DISTWIDTH] IN BLOOD BY AUTOMATED COUNT: 17 %
EST. GFR  (AFRICAN AMERICAN): 41.3 ML/MIN/1.73 M^2
EST. GFR  (AFRICAN AMERICAN): 44 ML/MIN/1.73 M^2
EST. GFR  (AFRICAN AMERICAN): 47.2 ML/MIN/1.73 M^2
EST. GFR  (AFRICAN AMERICAN): 47.2 ML/MIN/1.73 M^2
EST. GFR  (AFRICAN AMERICAN): 54.9 ML/MIN/1.73 M^2
EST. GFR  (NON AFRICAN AMERICAN): 35.7 ML/MIN/1.73 M^2
EST. GFR  (NON AFRICAN AMERICAN): 38.1 ML/MIN/1.73 M^2
EST. GFR  (NON AFRICAN AMERICAN): 40.8 ML/MIN/1.73 M^2
EST. GFR  (NON AFRICAN AMERICAN): 40.8 ML/MIN/1.73 M^2
EST. GFR  (NON AFRICAN AMERICAN): 47.5 ML/MIN/1.73 M^2
FLOW: 3
GIANT PLATELETS BLD QL SMEAR: PRESENT
GIANT PLATELETS BLD QL SMEAR: PRESENT
GLUCOSE SERPL-MCNC: 114 MG/DL
GLUCOSE SERPL-MCNC: 115 MG/DL
GLUCOSE SERPL-MCNC: 115 MG/DL
GLUCOSE SERPL-MCNC: 127 MG/DL
GLUCOSE SERPL-MCNC: 138 MG/DL
HCO3 UR-SCNC: 25.4 MMOL/L (ref 24–28)
HCT VFR BLD AUTO: 20.1 %
HCT VFR BLD AUTO: 20.1 %
HCT VFR BLD AUTO: 20.3 %
HCT VFR BLD AUTO: 21.4 %
HCT VFR BLD AUTO: 21.9 %
HCT VFR BLD AUTO: 22.7 %
HGB BLD-MCNC: 6.8 G/DL
HGB BLD-MCNC: 6.8 G/DL
HGB BLD-MCNC: 7 G/DL
HGB BLD-MCNC: 7.2 G/DL
HGB BLD-MCNC: 7.5 G/DL
HGB BLD-MCNC: 7.5 G/DL
HYPOCHROMIA BLD QL SMEAR: ABNORMAL
INR PPP: 1.8
LDH SERPL L TO P-CCNC: 0.6 MMOL/L (ref 0.36–1.25)
LDH SERPL L TO P-CCNC: 283 U/L
LYMPHOCYTES # BLD AUTO: 1.6 K/UL
LYMPHOCYTES # BLD AUTO: 1.6 K/UL
LYMPHOCYTES # BLD AUTO: 1.9 K/UL
LYMPHOCYTES # BLD AUTO: ABNORMAL K/UL
LYMPHOCYTES NFR BLD: 15 %
LYMPHOCYTES NFR BLD: 15 %
LYMPHOCYTES NFR BLD: 6.2 %
LYMPHOCYTES NFR BLD: 7.5 %
LYMPHOCYTES NFR BLD: 9.3 %
LYMPHOCYTES NFR BLD: 9.6 %
MAGNESIUM SERPL-MCNC: 1.9 MG/DL
MCH RBC QN AUTO: 27.1 PG
MCH RBC QN AUTO: 27.1 PG
MCH RBC QN AUTO: 27.2 PG
MCH RBC QN AUTO: 27.3 PG
MCH RBC QN AUTO: 27.9 PG
MCH RBC QN AUTO: 28.5 PG
MCHC RBC AUTO-ENTMCNC: 33 G/DL
MCHC RBC AUTO-ENTMCNC: 33.6 G/DL
MCHC RBC AUTO-ENTMCNC: 33.8 G/DL
MCHC RBC AUTO-ENTMCNC: 33.8 G/DL
MCHC RBC AUTO-ENTMCNC: 34.2 G/DL
MCHC RBC AUTO-ENTMCNC: 34.5 G/DL
MCV RBC AUTO: 80 FL
MCV RBC AUTO: 80 FL
MCV RBC AUTO: 81 FL
MCV RBC AUTO: 81 FL
MCV RBC AUTO: 82 FL
MCV RBC AUTO: 83 FL
MODE: ABNORMAL
MONOCYTES # BLD AUTO: 0.9 K/UL
MONOCYTES # BLD AUTO: 1 K/UL
MONOCYTES # BLD AUTO: 1.1 K/UL
MONOCYTES # BLD AUTO: ABNORMAL K/UL
MONOCYTES NFR BLD: 3 %
MONOCYTES NFR BLD: 3 %
MONOCYTES NFR BLD: 3.5 %
MONOCYTES NFR BLD: 4.3 %
MONOCYTES NFR BLD: 4.9 %
MONOCYTES NFR BLD: 5.5 %
NEUTROPHILS # BLD AUTO: 14.3 K/UL
NEUTROPHILS # BLD AUTO: 16.3 K/UL
NEUTROPHILS # BLD AUTO: 23.4 K/UL
NEUTROPHILS NFR BLD: 78 %
NEUTROPHILS NFR BLD: 78 %
NEUTROPHILS NFR BLD: 84.3 %
NEUTROPHILS NFR BLD: 84.6 %
NEUTROPHILS NFR BLD: 87.5 %
NEUTROPHILS NFR BLD: 88.7 %
NEUTS BAND NFR BLD MANUAL: 2 %
NEUTS BAND NFR BLD MANUAL: 2 %
NRBC BLD-RTO: ABNORMAL /100 WBC
OVALOCYTES BLD QL SMEAR: ABNORMAL
OVALOCYTES BLD QL SMEAR: ABNORMAL
PCO2 BLDA: 39.6 MMHG (ref 35–45)
PH SMN: 7.42 [PH] (ref 7.35–7.45)
PHOSPHATE SERPL-MCNC: 2.6 MG/DL
PLATELET # BLD AUTO: 163 K/UL
PLATELET # BLD AUTO: 169 K/UL
PLATELET # BLD AUTO: 170 K/UL
PLATELET # BLD AUTO: 170 K/UL
PLATELET # BLD AUTO: 176 K/UL
PLATELET # BLD AUTO: 176 K/UL
PLATELET BLD QL SMEAR: ABNORMAL
PMV BLD AUTO: 12.5 FL
PMV BLD AUTO: 12.9 FL
PMV BLD AUTO: 13.2 FL
PO2 BLDA: 102 MMHG (ref 80–100)
POC BE: 1 MMOL/L
POC SATURATED O2: 98 % (ref 95–100)
POC TCO2: 27 MMOL/L (ref 23–27)
POCT GLUCOSE: 119 MG/DL (ref 70–110)
POCT GLUCOSE: 121 MG/DL (ref 70–110)
POCT GLUCOSE: 133 MG/DL (ref 70–110)
POCT GLUCOSE: 161 MG/DL (ref 70–110)
POIKILOCYTOSIS BLD QL SMEAR: SLIGHT
POLYCHROMASIA BLD QL SMEAR: ABNORMAL
POTASSIUM SERPL-SCNC: 4.1 MMOL/L
POTASSIUM SERPL-SCNC: 4.2 MMOL/L
POTASSIUM SERPL-SCNC: 4.2 MMOL/L
PROT SERPL-MCNC: 5.1 G/DL
PROT SERPL-MCNC: 5.1 G/DL
PROT SERPL-MCNC: 5.3 G/DL
PROT SERPL-MCNC: 5.4 G/DL
PROTHROMBIN TIME: 18.1 SEC
RBC # BLD AUTO: 2.51 M/UL
RBC # BLD AUTO: 2.63 M/UL
RBC # BLD AUTO: 2.64 M/UL
RBC # BLD AUTO: 2.76 M/UL
SAMPLE: ABNORMAL
SITE: ABNORMAL
SODIUM SERPL-SCNC: 142 MMOL/L
SODIUM SERPL-SCNC: 144 MMOL/L
SODIUM SERPL-SCNC: 144 MMOL/L
SODIUM SERPL-SCNC: 145 MMOL/L
SODIUM SERPL-SCNC: 145 MMOL/L
SP02: 94
TARGETS BLD QL SMEAR: ABNORMAL
TRANS ERYTHROCYTES VOL PATIENT: NORMAL ML
WBC # BLD AUTO: 17.18 K/UL
WBC # BLD AUTO: 17.78 K/UL
WBC # BLD AUTO: 17.78 K/UL
WBC # BLD AUTO: 19.43 K/UL
WBC # BLD AUTO: 19.51 K/UL
WBC # BLD AUTO: 26.65 K/UL

## 2017-08-17 PROCEDURE — 85027 COMPLETE CBC AUTOMATED: CPT | Mod: 91

## 2017-08-17 PROCEDURE — 76937 US GUIDE VASCULAR ACCESS: CPT

## 2017-08-17 PROCEDURE — 85025 COMPLETE CBC W/AUTO DIFF WBC: CPT | Mod: 91

## 2017-08-17 PROCEDURE — 25000003 PHARM REV CODE 250: Performed by: STUDENT IN AN ORGANIZED HEALTH CARE EDUCATION/TRAINING PROGRAM

## 2017-08-17 PROCEDURE — 37799 UNLISTED PX VASCULAR SURGERY: CPT

## 2017-08-17 PROCEDURE — P9021 RED BLOOD CELLS UNIT: HCPCS

## 2017-08-17 PROCEDURE — 99233 SBSQ HOSP IP/OBS HIGH 50: CPT | Mod: 24,,, | Performed by: THORACIC SURGERY (CARDIOTHORACIC VASCULAR SURGERY)

## 2017-08-17 PROCEDURE — 0DJ08ZZ INSPECTION OF UPPER INTESTINAL TRACT, VIA NATURAL OR ARTIFICIAL OPENING ENDOSCOPIC: ICD-10-PCS | Performed by: INTERNAL MEDICINE

## 2017-08-17 PROCEDURE — 97530 THERAPEUTIC ACTIVITIES: CPT

## 2017-08-17 PROCEDURE — 99900035 HC TECH TIME PER 15 MIN (STAT)

## 2017-08-17 PROCEDURE — 20000000 HC ICU ROOM

## 2017-08-17 PROCEDURE — 94799 UNLISTED PULMONARY SVC/PX: CPT

## 2017-08-17 PROCEDURE — 82803 BLOOD GASES ANY COMBINATION: CPT

## 2017-08-17 PROCEDURE — 99223 1ST HOSP IP/OBS HIGH 75: CPT | Mod: 25,GC,, | Performed by: INTERNAL MEDICINE

## 2017-08-17 PROCEDURE — C1751 CATH, INF, PER/CENT/MIDLINE: HCPCS

## 2017-08-17 PROCEDURE — 84100 ASSAY OF PHOSPHORUS: CPT

## 2017-08-17 PROCEDURE — 93750 INTERROGATION VAD IN PERSON: CPT | Mod: ,,, | Performed by: THORACIC SURGERY (CARDIOTHORACIC VASCULAR SURGERY)

## 2017-08-17 PROCEDURE — 97535 SELF CARE MNGMENT TRAINING: CPT

## 2017-08-17 PROCEDURE — 99233 SBSQ HOSP IP/OBS HIGH 50: CPT | Mod: ,,, | Performed by: SURGERY

## 2017-08-17 PROCEDURE — 37000009 HC ANESTHESIA EA ADD 15 MINS: Performed by: INTERNAL MEDICINE

## 2017-08-17 PROCEDURE — 36569 INSJ PICC 5 YR+ W/O IMAGING: CPT

## 2017-08-17 PROCEDURE — D9220A PRA ANESTHESIA: Mod: CRNA,,, | Performed by: NURSE ANESTHETIST, CERTIFIED REGISTERED

## 2017-08-17 PROCEDURE — 83735 ASSAY OF MAGNESIUM: CPT

## 2017-08-17 PROCEDURE — 63600175 PHARM REV CODE 636 W HCPCS: Performed by: STUDENT IN AN ORGANIZED HEALTH CARE EDUCATION/TRAINING PROGRAM

## 2017-08-17 PROCEDURE — 83605 ASSAY OF LACTIC ACID: CPT

## 2017-08-17 PROCEDURE — 63600175 PHARM REV CODE 636 W HCPCS: Performed by: NURSE ANESTHETIST, CERTIFIED REGISTERED

## 2017-08-17 PROCEDURE — 43235 EGD DIAGNOSTIC BRUSH WASH: CPT | Mod: ,,, | Performed by: INTERNAL MEDICINE

## 2017-08-17 PROCEDURE — 25000003 PHARM REV CODE 250: Performed by: THORACIC SURGERY (CARDIOTHORACIC VASCULAR SURGERY)

## 2017-08-17 PROCEDURE — 85610 PROTHROMBIN TIME: CPT

## 2017-08-17 PROCEDURE — D9220A PRA ANESTHESIA: Mod: ANES,,, | Performed by: ANESTHESIOLOGY

## 2017-08-17 PROCEDURE — 85730 THROMBOPLASTIN TIME PARTIAL: CPT

## 2017-08-17 PROCEDURE — 25000003 PHARM REV CODE 250: Performed by: NURSE ANESTHETIST, CERTIFIED REGISTERED

## 2017-08-17 PROCEDURE — 94761 N-INVAS EAR/PLS OXIMETRY MLT: CPT

## 2017-08-17 PROCEDURE — 43235 EGD DIAGNOSTIC BRUSH WASH: CPT | Performed by: INTERNAL MEDICINE

## 2017-08-17 PROCEDURE — 27000221 HC OXYGEN, UP TO 24 HOURS

## 2017-08-17 PROCEDURE — 80053 COMPREHEN METABOLIC PANEL: CPT | Mod: 91

## 2017-08-17 PROCEDURE — 85007 BL SMEAR W/DIFF WBC COUNT: CPT

## 2017-08-17 PROCEDURE — C9113 INJ PANTOPRAZOLE SODIUM, VIA: HCPCS | Performed by: STUDENT IN AN ORGANIZED HEALTH CARE EDUCATION/TRAINING PROGRAM

## 2017-08-17 PROCEDURE — 37000008 HC ANESTHESIA 1ST 15 MINUTES: Performed by: INTERNAL MEDICINE

## 2017-08-17 PROCEDURE — 83615 LACTATE (LD) (LDH) ENZYME: CPT

## 2017-08-17 PROCEDURE — A4216 STERILE WATER/SALINE, 10 ML: HCPCS | Performed by: THORACIC SURGERY (CARDIOTHORACIC VASCULAR SURGERY)

## 2017-08-17 PROCEDURE — 27000248 HC VAD-ADDITIONAL DAY

## 2017-08-17 RX ORDER — ETOMIDATE 2 MG/ML
INJECTION INTRAVENOUS
Status: DISCONTINUED | OUTPATIENT
Start: 2017-08-17 | End: 2017-08-17

## 2017-08-17 RX ORDER — DOBUTAMINE HYDROCHLORIDE 400 MG/100ML
5 INJECTION INTRAVENOUS CONTINUOUS
Status: DISCONTINUED | OUTPATIENT
Start: 2017-08-17 | End: 2017-09-11

## 2017-08-17 RX ORDER — SODIUM CHLORIDE 0.9 % (FLUSH) 0.9 %
10 SYRINGE (ML) INJECTION
Status: DISCONTINUED | OUTPATIENT
Start: 2017-08-17 | End: 2017-09-19

## 2017-08-17 RX ORDER — HEPARIN SODIUM 10000 [USP'U]/100ML
100 INJECTION, SOLUTION INTRAVENOUS CONTINUOUS
Status: DISCONTINUED | OUTPATIENT
Start: 2017-08-17 | End: 2017-08-17

## 2017-08-17 RX ORDER — HYDROCODONE BITARTRATE AND ACETAMINOPHEN 500; 5 MG/1; MG/1
TABLET ORAL
Status: DISCONTINUED | OUTPATIENT
Start: 2017-08-17 | End: 2017-08-18

## 2017-08-17 RX ORDER — DOBUTAMINE HYDROCHLORIDE 200 MG/100ML
INJECTION INTRAVENOUS CONTINUOUS PRN
Status: DISCONTINUED | OUTPATIENT
Start: 2017-08-17 | End: 2017-08-17

## 2017-08-17 RX ORDER — SODIUM CHLORIDE 0.9 % (FLUSH) 0.9 %
10 SYRINGE (ML) INJECTION EVERY 6 HOURS
Status: DISCONTINUED | OUTPATIENT
Start: 2017-08-17 | End: 2017-09-19

## 2017-08-17 RX ADMIN — ETOMIDATE 4 MG: 2 INJECTION, SOLUTION INTRAVENOUS at 05:08

## 2017-08-17 RX ADMIN — PANTOPRAZOLE SODIUM 40 MG: 40 INJECTION, POWDER, FOR SOLUTION INTRAVENOUS at 09:08

## 2017-08-17 RX ADMIN — Medication 10 ML: at 06:08

## 2017-08-17 RX ADMIN — PANTOPRAZOLE SODIUM 8 MG/HR: 40 INJECTION, POWDER, FOR SOLUTION INTRAVENOUS at 10:08

## 2017-08-17 RX ADMIN — Medication 10 ML: at 05:08

## 2017-08-17 RX ADMIN — PANTOPRAZOLE SODIUM 8 MG/HR: 40 INJECTION, POWDER, FOR SOLUTION INTRAVENOUS at 06:08

## 2017-08-17 RX ADMIN — DOPAMINE HYDROCHLORIDE IN DEXTROSE 5 MCG/KG/MIN: 1.6 INJECTION, SOLUTION INTRAVENOUS at 02:08

## 2017-08-17 RX ADMIN — DOPAMINE HYDROCHLORIDE IN DEXTROSE 5 MCG/KG/MIN: 1.6 INJECTION, SOLUTION INTRAVENOUS at 04:08

## 2017-08-17 RX ADMIN — CALCIUM GLUCONATE: 94 INJECTION, SOLUTION INTRAVENOUS at 10:08

## 2017-08-17 RX ADMIN — ERTAPENEM SODIUM 1 G: 1 INJECTION, POWDER, LYOPHILIZED, FOR SOLUTION INTRAMUSCULAR; INTRAVENOUS at 09:08

## 2017-08-17 RX ADMIN — DOBUTAMINE IN DEXTROSE 2.5 MCG/KG/MIN: 400 INJECTION, SOLUTION INTRAVENOUS at 10:08

## 2017-08-17 RX ADMIN — HEPARIN SODIUM AND DEXTROSE 100 UNITS/HR: 10000; 5 INJECTION INTRAVENOUS at 10:08

## 2017-08-17 RX ADMIN — DOBUTAMINE HYDROCHLORIDE 2.5 MCG/KG/MIN: 200 INJECTION INTRAVENOUS at 04:08

## 2017-08-17 NOTE — ASSESSMENT & PLAN NOTE
Patient with nonoliguric INDIRA, likely ischemic (v. septic ATN) from renal hypoperfusion given cardiomyopathy.  Patient creatinine has remained stable, has maintained relative balance in I/O's, do not suspect elevated BUN indicative of uremia (elevation likely secondary to TPN administration and possible blood loss, agree with PRBC).  Discussed with primary team (Dr. Downing), requesting renal replacement therapy for clearance only.  Patient seen and examined on CRRT, will plan for SLED, 6hours, no net UF to prevent any hemodynamic instability that may lead to further renal injury, will continue to monitor closely.  Patient/wife voiced understanding of above.

## 2017-08-17 NOTE — ASSESSMENT & PLAN NOTE
May be 2/2 AVM vs PUD vs erosive esophagitis in the setting of anticoagulation and supratherapeutic INRs over the past week.    EGD today    Keep NPO    Would start PPI gtt.

## 2017-08-17 NOTE — PATIENT INSTRUCTIONS
Discharge Summary/Instructions after an Endoscopic Procedure  Patient Name: Suman Hayden  Patient MRN: [id number]  Patient YOB: 1950 Thursday, August 17, 2017  Kishore Mills MD  RESTRICTIONS:  During your procedure today, you received medications for sedation.  These   medications may affect your judgment, balance and coordination.  Therefore,   for 24 hours, you have the following restrictions:   - DO NOT drive a car, operate machinery, make legal/financial decisions,   sign important papers or drink alcohol.    ACTIVITY:  The following day: return to full activity including work, except no heavy   lifting, straining or running for 3 days if polyps were removed.  DIET:  Eat and drink normally unless instructed otherwise.  TREATMENT FOR COMMON SIDE EFFECTS:  - Mild abdominal pain, belching, bloating or excessive gas: rest, eat   lightly and use a heating pad.  - Sore Throat: treat with throat lozenges and/or gargle with warm salt   water.  SYMPTOMS TO WATCH FOR AND REPORT TO YOUR PHYSICIAN:  1. Abdominal pain or bloating, other than gas cramps.  2. Chest pain.  3. Back pain.  4. Chills or fever occurring within 24 hours after the procedure.  5. Rectal bleeding, which would show as bright red, maroon, or black stools.   (A tablespoon of blood from the rectum is not serious, especially if   hemorrhoids are present.)  6. Vomiting.  7. Weakness or dizziness.  8. Because air was used during the procedure, expelling large amounts of air   from your rectum or belching is normal.  9. If a bowel prep was taken, you may not have a bowel movement for 1-3   days.  This is normal.  GO DIRECTLY TO THE EMERGENCY ROOM IF YOU HAVE ANY OF THE FOLLOWING:   Difficulty breathing  Chills and/or fever over 101 F   Persistent vomiting and/or vomiting blood   Severe abdominal pain   Severe chest pain   Black, tarry stools   Bleeding- more than one tablespoon  Your doctor recommends these additional instructions:  If any  biopsies were taken, your doctorâs clinic will call you in 1 to 2   weeks with any results.  The findings and recommendations were discussed with your referring   physician.   The findings and recommendations were discussed with your family.   Do not eat or drink anything.   Broad spectrum antibiotic medications will be prescribed.   Protonix (pantoprazole) will be given: initiate therapy with 80 mg IV bolus,   then 8 mg/hr intravenously by continuous infusion.   Consider avoiding all non-steroidal anti-inflammatory drugs (aspirin,   ibuprofen, naproxen, etc.), unless needed for cardiovascular protection.    Recommend you discuss with your prescribing doctor (of your aspirin) to see   if cardiovascular benefits of your aspirin outweigh the risks of GI   bleeding.  Your physician has recommended a repeat upper endoscopy in one week to check   healing.  For questions, problems or results please call your physician - Kishore Mills MD at Work:  [phone].  [facility], EMERGENCY ROOM PHONE NUMBER: [phone]  IF A COMPLICATION OR EMERGENCY SITUATION ARISES AND YOU ARE UNABLE TO REACH   YOUR PHYSICIAN - GO DIRECTLY TO THE EMERGENCY ROOM.  Kishore Mills MD  8/17/2017 5:50:57 PM  This report has been verified and signed electronically.

## 2017-08-17 NOTE — PT/OT/SLP PROGRESS
Physical Therapy  Co-Treatment with OT    Suman Hayden   MRN: [id number]   Admitting Diagnosis: Acute on chronic combined systolic and diastolic heart failure    PT Received On: 08/17/17  PT Start Time: 0810     PT Stop Time: 0836    PT Total Time (min): 26 min       Billable Minutes:  Therapeutic Activity 26 min    Treatment Type: Other (see comments) (co-treatment with OT)  PT/PTA: PT     PTA Visit Number: 0       General Precautions: Standard, fall, sternal, LVAD  Orthopedic Precautions: N/A   Braces:      Do you have any cultural, spiritual, Yazdanism conflicts, given your current situation?: none noted     Subjective:  Communicated with nurse prior to session.      Pain/Comfort  Pain Rating 1: 0/10  Pain Rating Post-Intervention 1: 0/10    Objective:   Patient found with: telemetry, arterial line, pulse ox (continuous), oxygen, NG tube, SCD, delgado catheter, central line (B Rich Koby boots, LVAD, dialysis catheter R IJ.)    Functional Mobility:  Bed Mobility:   Rolling/Turning Right: Total assistance (pt needed verbal cues for functional mobility with sternal precautions. )  Supine to Sit: Total Assistance (pt sat on EOB x 10 min with CGA to to max assist. pt needed verbal cues to hold head upright with sitting. PT facilitated static sitting balance while pt performed ADLS with OT. )  Sit to Supine: Total Assistance, With assist of  2    Transfers:  Sit <> Stand Assistance: Total Assistance (pt was max assist x 2. pt was not able to come to upright posture with standing. )    Gait:   Gait Distance: not tested. see above      AM-PAC 6 CLICK MOBILITY  How much help from another person does this patient currently need?   1 = Unable, Total/Dependent Assistance  2 = A lot, Maximum/Moderate Assistance  3 = A little, Minimum/Contact Guard/Supervision  4 = None, Modified [place]/Independent    Turning over in bed (including adjusting bedclothes, sheets and blankets)?: 2  Sitting down on and standing up from a chair with  arms (e.g., wheelchair, bedside commode, etc.): 2  Moving from lying on back to sitting on the side of the bed?: 2  Moving to and from a bed to a chair (including a wheelchair)?: 1  Need to walk in hospital room?: 1  Climbing 3-5 steps with a railing?: 1  Total Score: 9    AM-PAC Raw Score CMS G-Code Modifier Level of Impairment Assistance   6 % Total / Unable   7 - 9 CM 80 - 100% Maximal Assist   10 - 14 CL 60 - 80% Moderate Assist   15 - 19 CK 40 - 60% Moderate Assist   20 - 22 CJ 20 - 40% Minimal Assist   23 CI 1-20% SBA / CGA   24 CH 0% Independent/ Mod I     Patient left supine with all lines intact, call button in reach and wife present. Pt bed put in chair position.     Assessment:  Suman Hayden is a 67 y.o. male with a medical diagnosis of Acute on chronic combined systolic and diastolic heart failure and presents with decreased mobility, transfers, decreased balance, strength and decreased distance ambulated. Pt would benefit from cont PT to address deficits and will need inpt rehab . Pt is s/p LVAD MH 3 placement 7/27 chest closure 7/28, re-intubation 8/9 and extubation 8/13/17.     Rehab identified problem list/impairments: Rehab identified problem list/impairments: weakness, impaired endurance, impaired functional mobilty, gait instability, impaired balance, decreased lower extremity function    Rehab potential is good.    Activity tolerance: Good    Discharge recommendations: Discharge Facility/Level Of Care Needs: rehabilitation facility     Barriers to discharge: Barriers to Discharge: Decreased caregiver support (wife will not be able to assist pt at current functional level. )    Equipment recommendations: Equipment Needed After Discharge:  (will determine DME needs closer to discharge. )     GOALS:    Physical Therapy Goals        Problem: Physical Therapy Goal    Goal Priority Disciplines Outcome Goal Variances Interventions   Physical Therapy Goal     PT/OT, PT Ongoing (interventions  implemented as appropriate)     Description:  Goals to be met by: 17     Patient will increase functional independence with mobility by performin. Supine to sit with MInimal Assistance- not met  2. Sit to supine with MInimal Assistance - not met  3. Sit to stand transfer with Minimal Assistance- not met  4. Bed to chair transfer with Minimal Assistance- not met  5. Gait  x 50 feet with Minimal Assistance. - not met  6. Lower extremity exercise program x15 reps, with supervision, in order to increase LE strength and (I) with functional mobility. - not met                        PLAN:    Patient to be seen 6 x/week  to address the above listed problems via gait training, therapeutic activities, therapeutic exercises  Plan of Care expires: 17  Plan of Care reviewed with: patient, spouse         Astrid Whaley, PT  2017

## 2017-08-17 NOTE — PT/OT/SLP PROGRESS
Speech Language Pathology      Suman Hayden  MRN: [id number]  [id number] A     Patient not seen today secondary to Nursing hold (Comment) (Pt strict NPO due to Upper GI bleed.). Will follow-up per plan of care.    SHERRI Hart, CCC-SLP, [facility], 8/17/2017

## 2017-08-17 NOTE — PLAN OF CARE
Problem: Patient Care Overview  Goal: Plan of Care Review  Outcome: Ongoing (interventions implemented as appropriate)  Plan of care reviewed with patient and patient's spouse by Dr. Downing, Dr. Espinoza and KEVIN Awad RN; all questions and concerns answered appropriately. Pt remains on Dopamine IV drip, Dobutamine IV drip and TPN IV drip. Sats 100% on 4L NC. VSS. SOBIA. No events with LVAD; speed remains at 5200. Driveline secure with no kinks. 1 BM. Minimal UO; Dr. Downing aware. EGD performed today; tolerated well. Turned Q2H per protocol; barrier applied to sacrum with turns. Pt remains strict NPO. See flowsheet for full assessment. Will continue to monitor patient closely.

## 2017-08-17 NOTE — HPI
Mr. Will is a 66 yo AAM with PMHx of NICM, HTN, HLD, and CKD III who presented to the hospital on 7/18 after syncopal episode at home.  He underwent LVAD placement on 7/27.      GI has been consulted to evaluate bloody NG output.  Canister currently has 700cc of bright red contents - RN reports this has been since ~7pm last night.     No melena/hematochezia     Hb 7.8 [Baseline 6.6-8 over previous 6 days]  INR 2.4 [peaked at 7.3 on 8/10]

## 2017-08-17 NOTE — PROGRESS NOTES
Notified CTS resident of UO 10 mL this hour, CVP 7. Orders to flush Singer catheter, will carry out and continue to monitor.

## 2017-08-17 NOTE — SUBJECTIVE & OBJECTIVE
Interval History: Patient moderately responsive to questioning this am, denies any respiratory distress this am.     Review of patient's allergies indicates:  No Known Allergies  Current Facility-Administered Medications   Medication Frequency    albumin human 5% bottle 12.5 g Once    albumin human 5% bottle 500 mL PRN    albuterol nebulizer solution 2.5 mg Q4H PRN    bisacodyl suppository 10 mg Daily PRN    dextrose 50% injection 12.5 g PRN    docusate sodium capsule 50 mg Daily    DOPamine 400 mg in dextrose 5 % 250 mL infusion (premix) (NON-TITRATING) Continuous    EPINEPHrine (ADRENALIN) 4 mg in sodium chloride 0.9% 250 mL infusion Continuous    ertapenem (INVANZ) 1 g in sodium chloride 0.9 % 100 mL IVPB (ready to mix system) Q24H    fentaNYL injection 50 mcg Q4H PRN    glucagon (human recombinant) injection 1 mg PRN    insulin aspart pen 0-5 Units Q4H PRN    ondansetron injection 4 mg Q6H PRN    pantoprazole injection 40 mg BID    polyethylene glycol packet 17 g Daily    sodium chloride 0.9% flush 10 mL Q6H    And    sodium chloride 0.9% flush 10 mL PRN    TPN ADULT CENTRAL LINE CUSTOM Continuous    TPN ADULT CENTRAL LINE CUSTOM Continuous       Objective:     Vital Signs (Most Recent):  Temp: 97.7 °F (36.5 °C) (08/16/17 1901)  Pulse: 105 (08/16/17 2028)  Resp: (!) 29 (08/16/17 2028)  BP: (!) 82/0 (08/16/17 1901)  SpO2: 100 % (08/16/17 1800)  O2 Device (Oxygen Therapy): nasal cannula w/ humidification (08/16/17 2028) Vital Signs (24h Range):  Temp:  [97.7 °F (36.5 °C)-99.4 °F (37.4 °C)] 97.7 °F (36.5 °C)  Pulse:  [] 105  Resp:  [10-38] 29  SpO2:  [95 %-100 %] 100 %  BP: (70-85)/(0) 82/0  Arterial Line BP: ()/(46-80) 87/69     Weight: 93.3 kg (205 lb 11 oz) (08/16/17 0500)  Body mass index is 31.27 kg/m².  Body surface area is 2.12 meters squared.    I/O last 3 completed shifts:  In: 3075.4 [I.V.:471; Blood:525]  Out: 4054 [Urine:2005; Drains:200; Other:1849]    Physical Exam    Constitutional: He appears well-developed and well-nourished.   Cardiovascular: Exam reveals no gallop and no friction rub.    No murmur heard.  Mechanical device sounds   Pulmonary/Chest: Effort normal and breath sounds normal. No respiratory distress. He has no wheezes. He has no rales.   Abdominal: Soft. There is no tenderness.   Musculoskeletal: He exhibits edema.   Neurological: He is alert.   Skin: Skin is warm and dry. No rash noted.   Vitals reviewed.      Significant Labs:   Reviewed    Significant Imaging:  Reviewed

## 2017-08-17 NOTE — PROCEDURES
"Suman Hayden is a 67 y.o. male patient.    Temp: 98.2 °F (36.8 °C) (08/17/17 1100)  Pulse: 100 (08/17/17 1100)  Resp: (!) 22 (08/17/17 1100)  BP: (!) 85/0 (08/17/17 0723)  SpO2: 100 % (08/17/17 1100)  Weight: 94.5 kg (208 lb 5.4 oz) (08/17/17 0500)  Height: 5' 8" (172.7 cm) (08/15/17 1000)    PICC  Date/Time: 8/17/2017 11:10 AM  Performed by: CHEYANNE SOARES  Consent Done: Yes  Time out: Immediately prior to procedure a time out was called to verify the correct patient, procedure, equipment, support staff and site/side marked as required  Indications: med administration and vascular access  Anesthesia: local infiltration  Local anesthetic: lidocaine 1% without epinephrine  Anesthetic Total (mL): 3  Preparation: skin prepped with ChloraPrep  Skin prep agent dried: skin prep agent completely dried prior to procedure  Sterile barriers: all five maximum sterile barriers used - cap, mask, sterile gown, sterile gloves, and large sterile sheet  Hand hygiene: hand hygiene performed prior to central venous catheter insertion  Location details: right brachial  Catheter type: double lumen  Catheter size: 5 Fr  Catheter Length: 38cm    Ultrasound guidance: yes  Vessel Caliber: medium and patent, compressibility normal  Vascular Doppler: not done  Needle advanced into vessel with real time Ultrasound guidance.  Guidewire confirmed in vessel.  Image recorded and saved.  Sterile sheath used.  no esophageal manometryNumber of attempts: 1  Post-procedure: blood return through all ports, chlorhexidine patch and sterile dressing applied  Technical procedures used: 3cg  Specimens: No  Implants: No  Assessment: placement verified by x-ray  Complications: none        Geovanna Curtis  8/17/2017  "

## 2017-08-17 NOTE — ANESTHESIA POSTPROCEDURE EVALUATION
"Anesthesia Post Evaluation    Patient: Suman Hayden    Procedure(s) Performed: Procedure(s) (LRB):  ESOPHAGOGASTRODUODENOSCOPY (EGD) (N/A)    Final Anesthesia Type: general  Patient location during evaluation: ICU  Patient participation: No - Unable to Participate, Sedation  Level of consciousness: sedated  Post-procedure vital signs: reviewed and stable  Pain management: adequate  Airway patency: patent  PONV status at discharge: No PONV  Anesthetic complications: no      Cardiovascular status: blood pressure returned to baseline  Respiratory status: unassisted, spontaneous ventilation and nasal cannula  Hydration status: euvolemic  Follow-up not needed.        Visit Vitals  BP (!) 85/0   Pulse 101   Temp 36.8 °C (98.2 °F) (Oral)   Resp (!) 24   Ht 5' 8" (1.727 m)   Wt 94.5 kg (208 lb 5.4 oz)   SpO2 (!) 88%   BMI 31.68 kg/m²       Pain/Gurpreet Score: Pain Assessment Performed: Yes (8/17/2017  3:15 PM)  Presence of Pain: denies (8/17/2017  3:15 PM)  Pain Rating Prior to Med Admin: 7 (8/16/2017 12:53 AM)  Pain Rating Post Med Admin: 2 (8/16/2017  1:34 AM)      "

## 2017-08-17 NOTE — PROGRESS NOTES
Daily E and M and VAD Interrogation Note    Reason for Visit:  Patient is seen in follow up for management of:  [] HeartMate II  [] Heartware [] Total artificial heart       [] ECMO           [x] Other - HM III     Interval History:  [] Interval history unobtainable due to intubation.  The [x] implant/[] explant date was 7/27/17    Events - Tolerated 6 hours of CRRT yesterday - BUN 83 this AM. Remains somnolent. NGT with 400 bloody output - GI consulted, plan to scope today.             Review of Systems:   Negative except as above.      Medications:  Current Facility-Administered Medications   Medication Dose Route Frequency Provider Last Rate Last Dose    0.9%  NaCl infusion (for blood administration)   Intravenous Q24H PRN Edwige Clay MD        albumin human 5% bottle 500 mL  500 mL Intravenous PRN Shayne Espinoza MD   500 mL at 08/09/17 0700    albuterol nebulizer solution 2.5 mg  2.5 mg Nebulization Q4H PRN Shayne Espinoza MD        bisacodyl suppository 10 mg  10 mg Rectal Daily PRN Shayne Espinoza MD   10 mg at 08/06/17 2036    dextrose 50% injection 12.5 g  12.5 g Intravenous PRN Charbel Ritter MD        DOBUtamine 1000 mg in D5W 250 mL infusion (premix non-titrating)  2.5 mcg/kg/min (Dosing Weight) Intravenous Continuous Shayne Espinoza MD 3 mL/hr at 08/17/17 1100 2.5 mcg/kg/min at 08/17/17 1100    DOPamine 400 mg in dextrose 5 % 250 mL infusion (premix) (NON-TITRATING)  5 mcg/kg/min (Dosing Weight) Intravenous Continuous Shayne Espinoza MD 15 mL/hr at 08/17/17 1100 5 mcg/kg/min at 08/17/17 1100    EPINEPHrine (ADRENALIN) 4 mg in sodium chloride 0.9% 250 mL infusion  0.01 mcg/kg/min (Dosing Weight) Intravenous Continuous Shayne Espinoza MD   Stopped at 08/15/17 1500    ertapenem (INVANZ) 1 g in sodium chloride 0.9 % 100 mL IVPB (ready to mix system)  1 g Intravenous Q24H Edwige Clay  mL/hr at 08/16/17 2138 1 g at 08/16/17 2138    fentaNYL injection  50 mcg  50 mcg Intravenous Q4H PRN Shayne Espinoza MD   50 mcg at 08/13/17 1310    glucagon (human recombinant) injection 1 mg  1 mg Intramuscular PRN Charbel Ritter MD        heparin 25,000 units in dextrose 5% 250 mL (100 units/mL) infusion (heparin infusion)  100 Units/hr Intravenous Continuous Shayne Espinoza MD 1 mL/hr at 08/17/17 1100 100 Units/hr at 08/17/17 1100    insulin aspart pen 0-5 Units  0-5 Units Subcutaneous Q4H PRN Charbel Ritter MD   2 Units at 08/10/17 0751    ondansetron injection 4 mg  4 mg Intravenous Q6H PRN Shayne Espinoza MD   4 mg at 08/08/17 2112    pantoprazole injection 40 mg  40 mg Intravenous BID Shayne Espinoza MD   40 mg at 08/17/17 0906    sodium chloride 0.9% flush 10 mL  10 mL Intravenous Q6H Sunny Downing MD   10 mL at 08/17/17 0509    And    sodium chloride 0.9% flush 10 mL  10 mL Intravenous PRN Sunny Downing MD   10 mL at 08/10/17 1151    sodium chloride 0.9% flush 10 mL  10 mL Intravenous Q6H Sunny Downing MD        And    sodium chloride 0.9% flush 10 mL  10 mL Intravenous PRN Sunny Downing MD        TPN ADULT CENTRAL LINE CUSTOM   Intravenous Continuous Vince Murguia MD 50 mL/hr at 08/17/17 1100      TPN ADULT CENTRAL LINE CUSTOM   Intravenous Continuous Shayne Espinoza MD         Physical Examination:  Vital Signs:   Vitals:    08/17/17 1100   BP:    Pulse: 100   Resp: (!) 22   Temp: 98.2 °F (36.8 °C)     Cardiovascular:  [x] Regular rate and rhythm [] Irregular []  None (MATT) []  Other  []  No edema [x]  Edema present  [x]  Clear to auscultation  []  Rales to []  Coarse  []  No rales but   [] Pedal Pulses absent  [x]  Pulses  + throughout  Skin:  Incision is [x]  Clean, dry and intact.  []  Other   Sternum:  [x]  Stable []  Unstable  Driveline(s):   [x]  Clean, dry and intact. []  Other     Labs:  BMP  Lab Results   Component Value Date     08/17/2017     08/17/2017    K 4.1 08/17/2017    K 4.1 08/17/2017      08/17/2017     08/17/2017    CO2 24 08/17/2017    CO2 24 08/17/2017    BUN 83 (H) 08/17/2017    BUN 83 (H) 08/17/2017    CREATININE 1.7 (H) 08/17/2017    CREATININE 1.7 (H) 08/17/2017    CALCIUM 8.1 (L) 08/17/2017    CALCIUM 8.1 (L) 08/17/2017    ANIONGAP 11 08/17/2017    ANIONGAP 11 08/17/2017    ESTGFRAFRICA 47.2 (A) 08/17/2017    ESTGFRAFRICA 47.2 (A) 08/17/2017    EGFRNONAA 40.8 (A) 08/17/2017    EGFRNONAA 40.8 (A) 08/17/2017     Magnesium   Date Value Ref Range Status   08/17/2017 1.9 1.6 - 2.6 mg/dL Final     Lab Results   Component Value Date    WBC 26.65 (H) 08/17/2017    HGB 7.5 (L) 08/17/2017    HCT 22.7 (L) 08/17/2017    MCV 82 08/17/2017     08/17/2017     Lab Results   Component Value Date    INR 1.8 (H) 08/17/2017    INR 2.4 (H) 08/16/2017    INR 2.8 (H) 08/15/2017     BNP   Date Value Ref Range Status   08/16/2017 2,542 (H) 0 - 99 pg/mL Final     Comment:     Values of less than 100 pg/ml are consistent with non-CHF populations.   08/14/2017 2,873 (H) 0 - 99 pg/mL Final     Comment:     Values of less than 100 pg/ml are consistent with non-CHF populations.   08/11/2017 2,609 (H) 0 - 99 pg/mL Final     Comment:     Values of less than 100 pg/ml are consistent with non-CHF populations.     LD   Date Value Ref Range Status   08/17/2017 283 (H) 110 - 260 U/L Final     Comment:     Results are increased in hemolyzed samples.   08/16/2017 547 (H) 110 - 260 U/L Final     Comment:     Results are increased in hemolyzed samples.   08/15/2017 375 (H) 110 - 260 U/L Final     Comment:     Results are increased in hemolyzed samples.     X-Rays:  [x]  I reviewed today's Chest x-ray    Procedure:  Device Interrogation including analysis of device parameters.  Current Settings   [x]  Ventricular Assist Device  []  Total Artificial Heart interrogated  Review of device function is [x]  Stable []  Other   TXP LVAD INTERROGATIONS 8/17/2017 8/17/2017 8/17/2017 8/17/2017 8/17/2017 8/17/2017 8/17/2017   Type  HeartMate3 HeartMate3 HeartMate3 HeartMate3 HeartMate3 HeartMate3 HeartMate3   Flow 4.1 4 4.2 4.2 4.1 3.9 4   Speed 5200 5200 5200 5200 5200 5200 5200   PI 3.1 3.2 3.3 3.1 3.5 4.3 3.7   Power ([place]) 3.4 3.4 3.5 3.5 3.6 3.6 3.6   LSL - - - - 4800 - -   Pulsatility - - - - - - -   Some recent data might be hidden       Assessment:  [x]  Primary Cardiomyopathy []  Congestive Heart Failure   []  Atrial Fibrillation []  Ventricular Tachycardia   []  Aftercare cardiac device []  Long term (current) use of anticoagulants   []  Ventilator-associated pneumonia []  Pneumonia viral, unspecified   []  Pneumonia, bacterial, unspecified []  Pneumonia, organism unspecified   []  Hemorrhage of GI tract, unspecified    []  Nosebleed []  Driveline infection   []  Infection VAD device []  New onset of    []        Plan:  [x]  Interval history obtained from ICU attending team member during rounding today  []  VAD/MATT teaching performed with patient  []  Mobilization / Physical Therapy ongoing  []  Anticoagulation []  Ongoing []  Held  []  Studies ordered  []   To OR today for closure.       Total time spent was 30 minutes.  Of which more than 50 percent of the care dominated counseling and coordinating care with different team members. The VAD was interrogated and all parameters were WNL and no significant findings were found in the history. All these findings are documented in the note above.    Neuro:  - Alert and oriented  - Wean sedation as tolerated     Resp:  - Extubated  - Resp cultures with ESBL - Ertapenem per ID   - Minimize supplemental O2  - Pulmonary toilet    CV:  - HDS; LVAD numbers stable  -  from 547  - Dobutamine - start at 2.5  - Dopamine at 5 - wean as dobutamine is started.  - Vaso and epi weaned off  - Hold  given GI bleed  - LVAD speed increased after echo yesterday     Heme:  - Hgb/Hct stable  - Continue to trend  - INR 1.8 this AM   - PICC consult  - Coumadin - hold given gastric dysmotility.   -  Heparin - restart at 100 u/hr    ID:  - afebrile  - Ertapenem started for ESBL pos resp culture  - WBC 17.8  - PICC removed yesterday and cultures   - Appreciate ID recs  - continue to monitor     Renal:  - Singer in place  - Strict I/Os   - Adequate UOP  - Lasix gtt off  - BUN 83 this AM - CRRT per nephro     FEN/GI:  - Strict NPO  - Protonix 40 BID  - Replace lytes PRN  - GI bleed - GI consulted - plan for EGD today  - Continue TPN    Endo:  - Endocrine following, appreciate assistance  - Accuchecks  - Insulin ssi    Dispo:  - Continue ICU care.       Shayne Espinoza MD  General Surgery PGY-2  Pager: [phone]    Cardiac Surgery Attending E and M (VAD) Note along with VAD Interrogation    I have seen and examined the patient and agree with the findings above    I also reviewed the patients clinical course and:  [x]  Hemodynamic & Respiratory paramters  [x]  Laboratory Data  [x]  Radiological studies     VAD Interrogated [x]      VAD Function is normal. Changes made []  None [x]        Interrogation of Ventricular assist device was performed with physician analysis of device parameters and review of device function. I have personally reviewed the interrogation findings and agree with findings as stated

## 2017-08-17 NOTE — SUBJECTIVE & OBJECTIVE
Past Medical History:   Diagnosis Date    Cardiomyopathy     Hyperlipidemia     Hypertension     Obesity     S/P implantation of automatic cardioverter/defibrillator (AICD)     Ventricular tachycardia        Past Surgical History:   Procedure Laterality Date    CARDIAC CATHETERIZATION      CARDIAC DEFIBRILLATOR PLACEMENT         Review of patient's allergies indicates:  No Known Allergies  Family History     None        Social History Main Topics    Smoking status: Never Smoker    Smokeless tobacco: Never Used    Alcohol use No    Drug use: Unknown    Sexual activity: Not on file     Review of Systems   Unable to perform ROS: Mental status change     Objective:     Vital Signs (Most Recent):  Temp: 98.2 °F (36.8 °C) (08/17/17 1100)  Pulse: 100 (08/17/17 1100)  Resp: (!) 22 (08/17/17 1100)  BP: (!) 85/0 (08/17/17 0723)  SpO2: 100 % (08/17/17 1100) Vital Signs (24h Range):  Temp:  [97.7 °F (36.5 °C)-99.3 °F (37.4 °C)] 98.2 °F (36.8 °C)  Pulse:  [] 100  Resp:  [20-29] 22  SpO2:  [94 %-100 %] 100 %  BP: (78-86)/(0) 85/0  Arterial Line BP: (74-92)/(57-71) 89/65     Weight: 94.5 kg (208 lb 5.4 oz) (08/17/17 0500)  Body mass index is 31.68 kg/m².      Intake/Output Summary (Last 24 hours) at 08/17/17 1120  Last data filed at 08/17/17 1100   Gross per 24 hour   Intake             2558 ml   Output             2285 ml   Net              273 ml       Lines/Drains/Airways     Peripherally Inserted Central Catheter Line                 PICC Double Lumen 08/17/17 1109 right brachial less than 1 day          Central Venous Catheter Line                 Trialysis (Dialysis) Catheter 08/09/17 0445 right internal jugular 8 days          Drain                 Urethral Catheter 08/09/17 0400 Straight-tip 16 Fr. 8 days         NG/OG Tube 08/16/17 1600 nasogastric Left nostril less than 1 day          Arterial Line                 Arterial Line 08/12/17 0302 Right Radial 5 days          Line                 VAD  07/27/17 1312 Left ventricular assist device HeartMate 3 20 days          Pressure Ulcer                 Pressure Ulcer 08/10/17 0300 midline sacral spine Stage II 7 days                Physical Exam   Constitutional: No distress.   HENT:   Mouth/Throat: Oropharynx is clear and moist. No oropharyngeal exudate.   Eyes: Conjunctivae are normal. No scleral icterus.   Neck: Neck supple. No tracheal deviation present.   Cardiovascular: Normal rate and regular rhythm.    LVAD hum   Pulmonary/Chest: Effort normal. No respiratory distress. He has no wheezes.   Abdominal: Soft. Bowel sounds are normal. He exhibits no distension and no mass. There is tenderness. There is no rebound and no guarding. No hernia.   Skin: Skin is warm and dry. He is not diaphoretic.       Significant Labs:  CBC:   Recent Labs  Lab 08/16/17  2347 08/17/17  0351 08/17/17  0723   WBC 19.51* 17.78*  17.78* 26.65*   HGB 7.0* 6.8*  6.8* 7.5*   HCT 20.3* 20.1*  20.1* 22.7*    170  170 176     CMP:   Recent Labs  Lab 08/17/17  0351   *  115*   CALCIUM 8.1*  8.1*   ALBUMIN 2.0*   PROT 5.3*     145   K 4.1  4.1   CO2 24  24     110   BUN 83*  83*   CREATININE 1.7*  1.7*   ALKPHOS 250*   *   *   BILITOT 2.5*     Coagulation:   Recent Labs  Lab 08/17/17  0351   INR 1.8*   APTT 31.7       Significant Imaging:  Imaging results within the past 24 hours have been reviewed.

## 2017-08-17 NOTE — ANESTHESIA PREPROCEDURE EVALUATION
08/17/2017  Pre-operative evaluation for Procedure(s) (LRB):  ESOPHAGOGASTRODUODENOSCOPY (EGD) (N/A )    Suman Hayden is a 67 y.o. male with PMHx of NICM, HTN, HLD, and CKD III who presented to the hospital on 7/18 after syncopal episode at home.  He underwent LVAD placement on 7/27. Pt is scheduled to have an EGD due to bloody NG output.  Canister currently has 700cc of bright red contents - RN reports this has been since ~7pm last night.         LDA:   Arterial line right radial  trialysis RIJ  PICC right brachial  NG tube    Prev airway:   Diagnosis: Hypoxic respiratory failure  Procedure start time: 8/9/2017 4:02 AM  Timeout: 8/9/2017 4:00 AM  Procedure end time: 8/9/2017 4:04 AM  Staffing  Anesthesiologist: MIKAEL HOGUE  Resident/CRNA: CED CAIN  Performed: resident/CRNA   Anesthesiologist was present at the time of the procedure.  Preanesthetic Checklist  Completed: patient identified, site marked, surgical consent, pre-op evaluation, timeout performed, IV checked, risks and benefits discussed, monitors and equipment checked and anesthesia consent given  Intubation  Indication: respiratory failure, hypoxemia, airway protection  Pre-oxygenation. Induction: intravenous, mask ventilation: easy mask.  Intubation: postinduction, laryngoscopy glidescope, Shahzad 3.  Endotracheal Tube: oral, 8.0 mm ID, cuffed (inflated to minimal occlusive pressure)  Attempts: 2, Grade I - full view of cords  Complicating Factors: none  Tube secured at 25 cm at the lips.  Findings post-intubation: bilateral breath sounds, positive ETCO2, atraumatic / condition of teeth unchanged  Position Confirmation: auscultation       Drips:   Dobutamine  Dopamine  heparin      Patient Active Problem List   Diagnosis    Nonischemic cardiomyopathy    CHF (NYHA class IV, ACC/AHA stage D)    Encounter for monitoring  amiodarone therapy    HTN (hypertension)    Hyperlipidemia    V-tach    Elevated PSA    Hepatitis B core antibody positive since 2012    Acute on chronic systolic heart failure    Heart transplant candidate    Hyperbilirubinemia    AICD discharge    Syncope and collapse    Atrial tachycardia    Palliative care encounter    Hyperglycemia    Heart failure    AICD (automatic cardioverter/defibrillator) present    LVAD (left ventricular assist device) present    Atrial fibrillation    Heart replaced by heart assist device    Acute kidney injury superimposed on CKD    Bacteremia    Acute renal failure with acute tubular necrosis superimposed on stage 3 chronic kidney disease    Upper GI bleed       Review of patient's allergies indicates:  No Known Allergies     Current Facility-Administered Medications on File Prior to Visit   Medication Dose Route Frequency Provider Last Rate Last Dose    0.9%  NaCl infusion (for blood administration)   Intravenous Q24H PRN Edwige Clay MD        albumin human 5% bottle 500 mL  500 mL Intravenous PRN Shayne Espinoza MD   500 mL at 08/09/17 0700    albuterol nebulizer solution 2.5 mg  2.5 mg Nebulization Q4H PRN Shayne Espinoza MD        bisacodyl suppository 10 mg  10 mg Rectal Daily PRN Shayne Espinoza MD   10 mg at 08/06/17 2036    dextrose 50% injection 12.5 g  12.5 g Intravenous PRN Charbel Ritter MD        DOBUtamine 1000 mg in D5W 250 mL infusion (premix non-titrating)  2.5 mcg/kg/min (Dosing Weight) Intravenous Continuous Shayne Espinoza MD 3 mL/hr at 08/17/17 1100 2.5 mcg/kg/min at 08/17/17 1100    DOPamine 400 mg in dextrose 5 % 250 mL infusion (premix) (NON-TITRATING)  5 mcg/kg/min (Dosing Weight) Intravenous Continuous Shayne Espinoza MD 15 mL/hr at 08/17/17 1100 5 mcg/kg/min at 08/17/17 1100    EPINEPHrine (ADRENALIN) 4 mg in sodium chloride 0.9% 250 mL infusion  0.01 mcg/kg/min (Dosing Weight) Intravenous  Continuous Shayne Espinoza MD   Stopped at 08/15/17 1500    ertapenem (INVANZ) 1 g in sodium chloride 0.9 % 100 mL IVPB (ready to mix system)  1 g Intravenous Q24H Edwige Clay  mL/hr at 08/16/17 2138 1 g at 08/16/17 2138    fentaNYL injection 50 mcg  50 mcg Intravenous Q4H PRN Shayne Espinoza MD   50 mcg at 08/13/17 1310    glucagon (human recombinant) injection 1 mg  1 mg Intramuscular PRN Charbel Ritter MD        heparin 25,000 units in dextrose 5% 250 mL (100 units/mL) infusion (heparin infusion)  100 Units/hr Intravenous Continuous Shayne Espinoza MD 1 mL/hr at 08/17/17 1100 100 Units/hr at 08/17/17 1100    insulin aspart pen 0-5 Units  0-5 Units Subcutaneous Q4H PRN Charbel Ritter MD   2 Units at 08/10/17 0751    ondansetron injection 4 mg  4 mg Intravenous Q6H PRN Shayne Espinoza MD   4 mg at 08/08/17 2112    pantoprazole injection 40 mg  40 mg Intravenous BID Shayne Espinoza MD   40 mg at 08/17/17 0906    sodium chloride 0.9% flush 10 mL  10 mL Intravenous Q6H Sunny Downing MD   10 mL at 08/17/17 0509    And    sodium chloride 0.9% flush 10 mL  10 mL Intravenous PRN Sunny Downing MD   10 mL at 08/10/17 1151    sodium chloride 0.9% flush 10 mL  10 mL Intravenous Q6H Sunny Downing MD        And    sodium chloride 0.9% flush 10 mL  10 mL Intravenous PRN Sunny Downing MD        TPN ADULT CENTRAL LINE CUSTOM   Intravenous Continuous Vince Murguia MD 50 mL/hr at 08/17/17 1100      TPN ADULT CENTRAL LINE CUSTOM   Intravenous Continuous Shayne Espinoza MD         Current Outpatient Prescriptions on File Prior to Visit   Medication Sig Dispense Refill    amiodarone (PACERONE) 200 MG Tab Take by mouth once daily.      aspirin 81 MG Chew Take 81 mg by mouth once daily.      DOBUTamine (DOBUTREX) 1,000 mg/250 mL (4,000 mcg/mL) infusion Inject 414 mcg/min into the vein continuous. 500 mL 11    furosemide (LASIX) 40 MG tablet Take 1 tablet (40 mg total) by  mouth 2 (two) times daily. 180 tablet 3    potassium chloride SA (K-DUR,KLOR-CON) 20 MEQ tablet Take 20 mEq by mouth once daily.       pravastatin (PRAVACHOL) 20 MG tablet Take 20 mg by mouth every evening.      spironolactone (ALDACTONE) 25 MG tablet Take 1 tablet (25 mg total) by mouth once daily. 90 tablet 3       Past Surgical History:   Procedure Laterality Date    CARDIAC CATHETERIZATION      CARDIAC DEFIBRILLATOR PLACEMENT         Social History     Social History    Marital status:      Spouse name: N/A    Number of children: N/A    Years of education: N/A     Occupational History    Not on file.     Social History Main Topics    Smoking status: Never Smoker    Smokeless tobacco: Never Used    Alcohol use No    Drug use: Unknown    Sexual activity: Not on file     Other Topics Concern    Not on file     Social History Narrative    No narrative on file         Vital Signs Range (Last 24H):  Temp:  [36.5 °C (97.7 °F)-37.4 °C (99.3 °F)]   Pulse:  []   Resp:  [20-29]   BP: (78-86)/(0)   SpO2:  [94 %-100 %]   Arterial Line BP: (74-92)/(57-71)       CBC:   Recent Labs      08/17/17   0351  08/17/17   0723  08/17/17   1145   WBC  17.78*  17.78*  26.65*  19.43*   RBC  2.51*  2.51*  2.76*  2.63*   HGB  6.8*  6.8*  7.5*  7.5*   HCT  20.1*  20.1*  22.7*  21.9*   PLT  170  170  176   --    MCV  80*  80*  82  83   MCH  27.1  27.1  27.2  28.5   MCHC  33.8  33.8  33.0  34.2       CMP:   Recent Labs      08/16/17   0405   08/17/17   0351  08/17/17   1145   NA  145  145   < >  145  145  144   K  4.0  4.0   < >  4.1  4.1  4.1   CL  109  109   < >  110  110  109   CO2  24  24   < >  24  24  22*   BUN  121*  121*   < >  83*  83*  92*   CREATININE  2.1*  2.1*   < >  1.7*  1.7*  1.8*   GLU  140*  140*   < >  115*  115*  127*   MG  2.4   --   1.9   --    PHOS  2.9   --   2.6*   --    CALCIUM  8.2*  8.2*   < >  8.1*  8.1*  8.1*   ALBUMIN  1.7*  1.7*   < >  2.0*  1.9*   PROT   5.3*  5.3*   < >  5.3*  5.1*   ALKPHOS  233*  233*   < >  250*  239*   ALT  259*  259*   < >  178*  145*   AST  520*  520*   < >  188*  140*   BILITOT  3.1*  3.1*   < >  2.5*  3.4*    < > = values in this interval not displayed.       INR  Recent Labs      08/15/17   0506  17   0405  17   0351   INR  2.8*  2.4*  1.8*   APTT  35.3*  34.9*  31.7           Diagnostic Studies:      EK17   Vent. Rate : 117 BPM     Atrial Rate : 117 BPM     P-R Int : 000 ms          QRS Dur : 176 ms      QT Int : 246 ms       P-R-T Axes : 000 122 -70 degrees     QTc Int : 343 ms    AV sequential or dual chamber electronic pacemaker  Nonspecific intraventricular block  Abnormal ECG  When compared with ECG of 2017 12:57,  No significant change was found  Confirmed by Cuong PARIKH, Rhett ([id number]) on 2017 8:51:57 PM    2D Echo:  CONCLUSIONS     1 - Heartmate III LVAD; speed 5100, aortic valve opens intermittently, septum is midline.     2 - Severe left atrial enlargement.     3 - Mild left ventricular enlargement.     4 - Severely depressed left ventricular systolic function (EF 10-15%).     5 - Segmental wall motion abnormalities.     6 - Mildly depressed right ventricular systolic function .     7 - Mild tricuspid regurgitation.     This document has been electronically    SIGNED BY: Malathi Muhammad MD On: 2017 10:05      Pre-op Assessment    I have reviewed the Patient Summary Reports.      I have reviewed the Medications.     Review of Systems  Anesthesia Hx:  No problems with previous Anesthesia  History of prior surgery of interest to airway management or planning: heart surgery. Previous anesthesia: General Denies Family Hx of Anesthesia complications.   Denies Personal Hx of Anesthesia complications.   Social:  Non-Smoker    Hematology/Oncology:  Hematology Normal   Oncology Normal     EENT/Dental:EENT/Dental Normal   Cardiovascular:   Pacemaker (placed 2017) Hypertension CHF hyperlipidemia         Pulmonary:  Pulmonary Normal    Renal/:   Chronic Renal Disease, CRI    Hepatic/GI:  Hepatic/GI Normal    Musculoskeletal:  Musculoskeletal Normal    Neurological:  Neurology Normal    Endocrine:  Endocrine Normal    Psych:  Psychiatric Normal           Physical Exam  General:  Well nourished    Airway/Jaw/Neck:  Airway Findings: Mouth Opening: Normal Tongue: Large  General Airway Assessment: Adult  Mallampati: III  Improves to II with phonation.  TM Distance: 4 - 6 cm        Eyes/Ears/Nose:  EYES/EARS/NOSE FINDINGS: Normal   Dental:  Dental Findings: Edentulous   Chest/Lungs:  Chest/Lungs Clear    Heart/Vascular:  Heart Findings: Normal       Mental Status:  Mental Status Findings:  Confusion         Anesthesia Plan  Type of Anesthesia, risks & benefits discussed:  Anesthesia Type:  general  Patient's Preference:   Intra-op Monitoring Plan: standard ASA monitors and arterial line  Intra-op Monitoring Plan Comments:   Post Op Pain Control Plan: multimodal analgesia and per primary service following discharge from PACU  Post Op Pain Control Plan Comments:   Induction:   IV  Beta Blocker:  Patient is not currently on a Beta-Blocker (No further documentation required).       Informed Consent: Patient representative understands risks and agrees with Anesthesia plan.  Questions answered. Anesthesia consent signed with patient representative.  ASA Score: 4     Day of Surgery Review of History & Physical: I have interviewed and examined the patient. I have reviewed the patient's H&P dated:    H&P update referred to the surgeon.         Ready For Surgery From Anesthesia Perspective.

## 2017-08-17 NOTE — CONSULTS
[facility]  Gastroenterology  Consult Note    Patient Name: Suman Hayden  MRN: [id number]  Admission Date: 7/18/2017  Hospital Length of Stay: 30 days  Code Status: Prior   Attending Provider: Sunny Downing MD   Consulting Provider: Natalio Russo MD  Primary Care Physician: Joe Ernst MD  Principal Problem:Acute on chronic combined systolic and diastolic heart failure    Consults  Subjective:     HPI:  Mr. Will is a 68 yo AAM with PMHx of NICM, HTN, HLD, and CKD III who presented to the hospital on 7/18 after syncopal episode at home.  He underwent LVAD placement on 7/27.      GI has been consulted to evaluate bloody NG output.  Canister currently has 700cc of bright red contents - RN reports this has been since ~7pm last night.     No melena/hematochezia     Hb 7.8 [Baseline 6.6-8 over previous 6 days]  INR 2.4 [peaked at 7.3 on 8/10]          Past Medical History:   Diagnosis Date    Cardiomyopathy     Hyperlipidemia     Hypertension     Obesity     S/P implantation of automatic cardioverter/defibrillator (AICD)     Ventricular tachycardia        Past Surgical History:   Procedure Laterality Date    CARDIAC CATHETERIZATION      CARDIAC DEFIBRILLATOR PLACEMENT         Review of patient's allergies indicates:  No Known Allergies  Family History     None        Social History Main Topics    Smoking status: Never Smoker    Smokeless tobacco: Never Used    Alcohol use No    Drug use: Unknown    Sexual activity: Not on file     Review of Systems   Unable to perform ROS: Mental status change     Objective:     Vital Signs (Most Recent):  Temp: 98.2 °F (36.8 °C) (08/17/17 1100)  Pulse: 100 (08/17/17 1100)  Resp: (!) 22 (08/17/17 1100)  BP: (!) 85/0 (08/17/17 0723)  SpO2: 100 % (08/17/17 1100) Vital Signs (24h Range):  Temp:  [97.7 °F (36.5 °C)-99.3 °F (37.4 °C)] 98.2 °F (36.8 °C)  Pulse:  [] 100  Resp:  [20-29] 22  SpO2:  [94 %-100 %] 100 %  BP: (78-86)/(0) 85/0  Arterial Line  BP: (74-92)/(57-71) 89/65     Weight: 94.5 kg (208 lb 5.4 oz) (08/17/17 0500)  Body mass index is 31.68 kg/m².      Intake/Output Summary (Last 24 hours) at 08/17/17 1120  Last data filed at 08/17/17 1100   Gross per 24 hour   Intake             2558 ml   Output             2285 ml   Net              273 ml       Lines/Drains/Airways     Peripherally Inserted Central Catheter Line                 PICC Double Lumen 08/17/17 1109 right brachial less than 1 day          Central Venous Catheter Line                 Trialysis (Dialysis) Catheter 08/09/17 0445 right internal jugular 8 days          Drain                 Urethral Catheter 08/09/17 0400 Straight-tip 16 Fr. 8 days         NG/OG Tube 08/16/17 1600 nasogastric Left nostril less than 1 day          Arterial Line                 Arterial Line 08/12/17 0302 Right Radial 5 days          Line                 VAD 07/27/17 1312 Left ventricular assist device HeartMate 3 20 days          Pressure Ulcer                 Pressure Ulcer 08/10/17 0300 midline sacral spine Stage II 7 days                Physical Exam   Constitutional: No distress.   HENT:   Mouth/Throat: Oropharynx is clear and moist. No oropharyngeal exudate.   Eyes: Conjunctivae are normal. No scleral icterus.   Neck: Neck supple. No tracheal deviation present.   Cardiovascular: Normal rate and regular rhythm.    LVAD hum   Pulmonary/Chest: Effort normal. No respiratory distress. He has no wheezes.   Abdominal: Soft. Bowel sounds are normal. He exhibits no distension and no mass. There is tenderness. There is no rebound and no guarding. No hernia.   Skin: Skin is warm and dry. He is not diaphoretic.       Significant Labs:  CBC:   Recent Labs  Lab 08/16/17  2347 08/17/17  0351 08/17/17  0723   WBC 19.51* 17.78*  17.78* 26.65*   HGB 7.0* 6.8*  6.8* 7.5*   HCT 20.3* 20.1*  20.1* 22.7*    170  170 176     CMP:   Recent Labs  Lab 08/17/17  0351   *  115*   CALCIUM 8.1*  8.1*   ALBUMIN 2.0*    PROT 5.3*     145   K 4.1  4.1   CO2 24  24     110   BUN 83*  83*   CREATININE 1.7*  1.7*   ALKPHOS 250*   *   *   BILITOT 2.5*     Coagulation:   Recent Labs  Lab 08/17/17  0351   INR 1.8*   APTT 31.7       Significant Imaging:  Imaging results within the past 24 hours have been reviewed.    Assessment/Plan:     Upper GI bleed    May be 2/2 AVM vs PUD vs erosive esophagitis in the setting of anticoagulation and supratherapeutic INRs over the past week.    EGD today    Keep NPO    Would start PPI gtt.              Thank you for your consult. I will follow-up with patient. Please contact us if you have any additional questions.    Natalio Lambert   Gastroenterology Fellow PGY VI  [phone]  I was present with the fellow during the above evaluation, including history and exam.  I discussed the case with the fellow and agree with the findings and plan as documented in the fellow's note.

## 2017-08-17 NOTE — PLAN OF CARE
Problem: Patient Care Overview  Goal: Plan of Care Review  Outcome: Ongoing (interventions implemented as appropriate)  No acute events overnight, VSS. HM3 speed @ 5200, no VAD alarms. Pt follows commands but remains disoriented to time. NC @ 4L, SpO2 > 95%, ABG obtained. NGT output dark red, 200mL. 1 unit PRBCs given. Pulses dopplerable, pressures correlate. UO marginal throughout shift, Dr. Clay aware, 250 mL albumin given over 2 hours. Dopamine remains @ 5 mcg/kg/min and TPN @ 50 mL/hr. No BMs but passing flatus. Wife remains at bedside. All questions and concerns addressed, see flowsheets for assessment findings, gtts, and vitals. Will continue to monitor.

## 2017-08-17 NOTE — PROGRESS NOTES
[facility]  Nephrology  Progress Note    Patient Name: Suman Hayden  MRN: [id number]  Admission Date: 7/18/2017  Hospital Length of Stay: 29 days  Attending Provider: Sunny Downing MD   Primary Care Physician: Joe Ernst MD  Principal Problem:Acute on chronic combined systolic and diastolic heart failure    Subjective:     HPI: Mr. Will is a 66 yo AAM with PMHx of NICM, HTN, HLD, and CKD III who presented to the hospital on 7/18 after syncopal episode at home.  He underwent LVAD placement on 7/27.  Labs that day showed a spike in his SCr to 1.5, but he quickly returned to baseline (1.2-1.3) and stayed stable until 08/01 when he increased to 1.5 and has steadily remained above baseline since, up to 2.3 on consult.  He has remained on lasix infusion with intermittent dosages of diuril to aid in diuresis.  On 08/3, there was a reported decrease in LVAD flows which resolved with decrease in lasix infusion and administration of 1 unit of PRBCs, likely causing decrease renal perfusion leading to an ischemic event.  He continues with adequate UOP on lasix gtt. He was transferred to the floor from ICU on 08/08 and was noted to become hypotensive with a doppler pressure of 58, since been requiring pressors for BP support.  Since ICU readmission he has been having increase OG tube drainage averaging around 1.5L/24h for last 2 days.  Nephrology was consulted for INDIRA and decreased UOP.    Interval History: Patient moderately responsive to questioning this am, denies any respiratory distress this am.     Review of patient's allergies indicates:  No Known Allergies  Current Facility-Administered Medications   Medication Frequency    albumin human 5% bottle 12.5 g Once    albumin human 5% bottle 500 mL PRN    albuterol nebulizer solution 2.5 mg Q4H PRN    bisacodyl suppository 10 mg Daily PRN    dextrose 50% injection 12.5 g PRN    docusate sodium capsule 50 mg Daily    DOPamine 400 mg in dextrose 5  % 250 mL infusion (premix) (NON-TITRATING) Continuous    EPINEPHrine (ADRENALIN) 4 mg in sodium chloride 0.9% 250 mL infusion Continuous    ertapenem (INVANZ) 1 g in sodium chloride 0.9 % 100 mL IVPB (ready to mix system) Q24H    fentaNYL injection 50 mcg Q4H PRN    glucagon (human recombinant) injection 1 mg PRN    insulin aspart pen 0-5 Units Q4H PRN    ondansetron injection 4 mg Q6H PRN    pantoprazole injection 40 mg BID    polyethylene glycol packet 17 g Daily    sodium chloride 0.9% flush 10 mL Q6H    And    sodium chloride 0.9% flush 10 mL PRN    TPN ADULT CENTRAL LINE CUSTOM Continuous    TPN ADULT CENTRAL LINE CUSTOM Continuous       Objective:     Vital Signs (Most Recent):  Temp: 97.7 °F (36.5 °C) (08/16/17 1901)  Pulse: 105 (08/16/17 2028)  Resp: (!) 29 (08/16/17 2028)  BP: (!) 82/0 (08/16/17 1901)  SpO2: 100 % (08/16/17 1800)  O2 Device (Oxygen Therapy): nasal cannula w/ humidification (08/16/17 2028) Vital Signs (24h Range):  Temp:  [97.7 °F (36.5 °C)-99.4 °F (37.4 °C)] 97.7 °F (36.5 °C)  Pulse:  [] 105  Resp:  [10-38] 29  SpO2:  [95 %-100 %] 100 %  BP: (70-85)/(0) 82/0  Arterial Line BP: ()/(46-80) 87/69     Weight: 93.3 kg (205 lb 11 oz) (08/16/17 0500)  Body mass index is 31.27 kg/m².  Body surface area is 2.12 meters squared.    I/O last 3 completed shifts:  In: 3075.4 [I.V.:471; Blood:525]  Out: 4054 [Urine:2005; Drains:200; Other:1849]    Physical Exam   Constitutional: He appears well-developed and well-nourished.   Cardiovascular: Exam reveals no gallop and no friction rub.    No murmur heard.  Mechanical device sounds   Pulmonary/Chest: Effort normal and breath sounds normal. No respiratory distress. He has no wheezes. He has no rales.   Abdominal: Soft. There is no tenderness.   Musculoskeletal: He exhibits edema.   Neurological: He is alert.   Skin: Skin is warm and dry. No rash noted.   Vitals reviewed.      Significant Labs:   Reviewed    Significant  Imaging:  Reviewed    Assessment/Plan:     Acute renal failure with acute tubular necrosis superimposed on stage 3 chronic kidney disease    Patient with nonoliguric INDIRA, likely ischemic (v. septic ATN) from renal hypoperfusion given cardiomyopathy.  Patient creatinine has remained stable, has maintained relative balance in I/O's, do not suspect elevated BUN indicative of uremia (elevation likely secondary to TPN administration and possible blood loss, agree with PRBC).  Discussed with primary team (Dr. Downing), requesting renal replacement therapy for clearance only.  Patient seen and examined on CRRT, will plan for SLED, 6hours, no net UF to prevent any hemodynamic instability that may lead to further renal injury, will continue to monitor closely.  Patient/wife voiced understanding of above.                David Sal MD  Nephrology  [facility]

## 2017-08-17 NOTE — PROGRESS NOTES
Notified Dr. Clay of continued low UO, 20 mL this hour. Orders to administer 250 mL albumin over 2 hours. Will carry out and continue to monitor.

## 2017-08-17 NOTE — PROGRESS NOTES
Progress Note  Surgical Intensive Care    Admit Date: 7/18/2017  Post-operative Day: 20 Days Post-Op  Hospital Day: 31    SUBJECTIVE:     Follow-up For:  Procedure(s) (LRB):  CLOSURE-STERNAL WOUND (N/A)  PLACEMENT-NEOPERICARDIUM (N/A)   S/p sternotomy closure 7/28/17    Interval history: Dark bloody NGT output, about 200 cc overnight. Given 250 cc albumin for decreased UOP and given 1U pRBCs for H/H of 6.8/20.1. No BM yesterday/overnight. On dopamine 5, epi off. Pain well controlled.       Continuous Infusions:   DOBUTamine      DOPamine 5 mcg/kg/min (08/17/17 0700)    epinephrine infusion Stopped (08/15/17 1500)    heparin (porcine) in 5 % dex      TPN ADULT CENTRAL LINE CUSTOM 50 mL/hr at 08/17/17 0700    TPN ADULT CENTRAL LINE CUSTOM       Scheduled Meds:   ertapenem (INVANZ) IVPB  1 g Intravenous Q24H    pantoprazole  40 mg Intravenous BID    sodium chloride 0.9%  10 mL Intravenous Q6H     PRN Meds:sodium chloride, albumin human 5%, albuterol sulfate, bisacodyl, dextrose 50%, fentaNYL, glucagon (human recombinant), insulin aspart, ondansetron, Flushing PICC Protocol **AND** sodium chloride 0.9% **AND** sodium chloride 0.9%    Review of patient's allergies indicates:  No Known Allergies    OBJECTIVE:     Vital Signs (Most Recent)  Temp: 98.1 °F (36.7 °C) (08/17/17 0700)  Pulse: 100 (08/17/17 0715)  Resp: 20 (08/17/17 0700)  BP: (!) 85/0 (08/17/17 0723)  SpO2: 100 % (08/17/17 0909)    Vital Signs Range (Last 24H):  Temp:  [97.7 °F (36.5 °C)-99.3 °F (37.4 °C)]   Pulse:  []   Resp:  [20-29]   BP: (70-86)/(0)   SpO2:  [94 %-100 %]   Arterial Line BP: (68-92)/(46-70)     I & O (Last 24H):    Intake/Output Summary (Last 24 hours) at 08/17/17 1049  Last data filed at 08/17/17 0700   Gross per 24 hour   Intake             2558 ml   Output             2405 ml   Net              153 ml        Physical Exam    Constitutional: He appears well-developed and well-nourished. No distress. He is alert  HENT:    Head: Normocephalic and atraumatic.    Neck: Normal range of motion. Neck supple.   Cardiovascular: Intact distal pulses.    LVAD hum present.   Pulmonary/Chest: Effort normal. No respiratory distress..   Abdominal: Soft. He exhibits no distension.   Skin: Skin is warm and dry.     Laboratory (Last 24H):  CBC:    Recent Labs  Lab 08/17/17  0723   WBC 26.65*   HGB 7.5*   HCT 22.7*        CMP:    Recent Labs  Lab 08/17/17  0351   CALCIUM 8.1*  8.1*   ALBUMIN 2.0*   PROT 5.3*     145   K 4.1  4.1   CO2 24  24     110   BUN 83*  83*   CREATININE 1.7*  1.7*   ALKPHOS 250*   *   *   BILITOT 2.5*     Echo    CONCLUSIONS     1 - Heartmate III LVAD; speed 5100, aortic valve opens intermittently, septum is midline.     2 - Severe left atrial enlargement.     3 - Mild left ventricular enlargement.     4 - Severely depressed left ventricular systolic function (EF 10-15%).     5 - Segmental wall motion abnormalities.     6 - Mildly depressed right ventricular systolic function .     7 - Mild tricuspid regurgitation.     ASSESSMENT/PLAN:     Neuro:  - alert and responding to commands  - sedation off    Resp:  - stable on NC  - Possible aspiration event causing sepsis - resp cultures growing ESBL   - wean supplemental O2 as tolerated     CV:  - HDS; LVAD numbers stable  - Dopamine 5, may switch to dobutamine, mgmt per CTS  - epi and levo off  - Hold  given GI bleed  - f/u Echo    Heme:  - Hgb/Hct 7.5/22.7  - 1U pRBCs  - Continue to trend  - INR down to 1.8  - Heparin off    ID:  - afebrile  - WBC 26.6  - Likely aspiration pneumonia   - bacteremic and resp cultures ESBL+, on ertapenem  - ID consulted  - blood cx 8/11 NGTD      Renal:  - Isnger in place  - Strict I/Os   - UOP moderate at 30cc/hr  - Cr 1,7, monitor closely  - CRRT off     FEN/GI:  - Protonix BID for possible GIB  - Replace lytes PRN  - holding miralax     Endo:  - Endocrine following  - Accuchecks  - SSI     Dispo:  -  Continue ICU care  - wean drips as tolerated    Vince Murguia PGY-1  [phone]  08/17/2017

## 2017-08-17 NOTE — PT/OT/SLP PROGRESS
Occupational Therapy  Treatment    Suman Hayden   MRN: [id number]   Admitting Diagnosis: Acute on chronic combined systolic and diastolic heart failure    OT Date of Treatment: 08/17/17   OT Start Time: 0915  OT Stop Time: 0941  OT Total Time (min): 26 min    Billable Minutes:  Self Care/Home Management 23    General Precautions: Standard, LVAD, fall, sternal  Orthopedic Precautions:    Braces:           Subjective:  Communicated with RN prior to session.  No complaints    Pain/Comfort  Pain Rating 1: 0/10  Pain Rating Post-Intervention 1: 0/10    Objective:  Patient found with: arterial line, blood pressure cuff, NG tube, pulse ox (continuous), telemetry, PICC line, cervical collar, delgado catheter, oxygen     Functional Mobility:  Bed Mobility:  Rolling/Turning to Left: Total assistance  Rolling/Turning Right: Total assistance  Scooting/Bridging: Total Assistance  Supine to Sit: Total Assistance  Sit to Supine: Total Assistance    Transfers:   Sit <> Stand Assistance: Total Assistance (total A x 2 from EOB)  Sit <> Stand Assistive Device: No Assistive Device    Functional Ambulation: NT    Activities of Daily Living:  Feeding Level of Assistance: Activity did not occur  Feeding adaptive equipment:   UE Dressing Level of Assistance: Total assistance  UE adaptive equipment:   LE Dressing Level of Assistance: Total assistance  LE adaptive equipment:   Grooming Position: Seated  Grooming Level of Assistance: Moderate assistance  Toileting Where Assessed: Bed level  Toileting Level of Assistance: Total assistance  Bathing Where Assessed: Other (Comment) (seated EOB)  Bathing Level of Assistance: Total assistance (pt assisted, but less than 25%)  Bathing adaptive equipment:       Therapeutic Activities and Exercises:  Pt sat EOB x 15 minutes while engaged in self-care tasks as above with cueing needed for upright posture and to maintain neck in neutral alignment. Pt re-ed on VAD placement during transitions    AM-PAC 6  "CLICK ADL   How much help from another person does this patient currently need?   1 = Unable, Total/Dependent Assistance  2 = A lot, Maximum/Moderate Assistance  3 = A little, Minimum/Contact Guard/Supervision  4 = None, Modified [place]/Independent    Putting on and taking off regular lower body clothing? : 1  Bathing (including washing, rinsing, drying)?: 1  Toileting, which includes using toilet, bedpan, or urinal? : 1  Putting on and taking off regular upper body clothing?: 1  Taking care of personal grooming such as brushing teeth?: 2  Eating meals?: 1  Total Score: 7     AM-PAC Raw Score CMS "G-Code Modifier Level of Impairment Assistance   6 % Total / Unable   7 - 8 CM 80 - 100% Maximal Assist   9-13 CL 60 - 80% Moderate Assist   14 - 19 CK 40 - 60% Moderate Assist   20 - 22 CJ 20 - 40% Minimal Assist   23 CI 1-20% SBA / CGA   24 CH 0% Independent/ Mod I       Patient left with bed in chair position with all lines intact, call button in reach, RN notified and spouse present    ASSESSMENT:  Suman Hayden is a 67 y.o. male with a medical diagnosis of Acute on chronic combined systolic and diastolic heart failure and presents with increased weakness and decreased endurance s/p LVAD and return to ICU.    Rehab identified problem list/impairments: Rehab identified problem list/impairments: weakness, impaired endurance, impaired self care skills, impaired balance, gait instability, impaired functional mobilty, decreased coordination, decreased upper extremity function, decreased lower extremity function, impaired cardiopulmonary response to activity, impaired fine motor, impaired skin, edema    Rehab potential is good.    Activity tolerance: good    Discharge recommendations: Discharge Facility/Level Of Care Needs: rehabilitation facility     Barriers to discharge: Barriers to Discharge: Decreased caregiver support (at current functional level)    Equipment recommendations:  (TBD closer to d/c)     GOALS: "    Occupational Therapy Goals        Problem: Occupational Therapy Goal    Goal Priority Disciplines Outcome Interventions   Occupational Therapy Goal     OT, PT/OT Ongoing (interventions implemented as appropriate)    Description:  Goals to be met by:  2 weeks 8/28/17    Patient will increase functional independence with ADLs by performing:  Feeding: Independent   UE Dressing with Supervision.  LE Dressing with Supervision.  Grooming while standing with Supervision.  Toileting from toilet with Supervision for hygiene and clothing management.   Stand pivot transfers with Supervision.  Toilet transfer to toilet with Supervision.  Pt will be supervision  with LVAD yasmin't.                  Multidisciplinary Problems (Resolved)        Problem: Occupational Therapy Goal    Goal Priority Disciplines Outcome Interventions   Occupational Therapy Goal   (Resolved)     OT, PT/OT  Error    Description:  Goals to be met by: 8/04/2017    Patient will increase functional independence with ADLs by performing:    Increased functional strength to 5/5 for improved ADL performance.  Upper extremity exercise program and R LE ankle pumps  3x 10 reps per handout, with independence.                      Plan:  Patient to be seen 6 x/week to address the above listed problems via self-care/home management, therapeutic activities, therapeutic exercises  Plan of Care expires: 08/29/17  Plan of Care reviewed with: patient, spouse         Sammi DELMY Malloy  08/17/2017

## 2017-08-17 NOTE — CONSULTS
Double lumen PICC placed in right brachial vein of LARON, 38cm in length with 0cm exposed and 32cm arm circumference. Lot#[id number].

## 2017-08-18 PROBLEM — R78.81 BACTEREMIA: Status: ACTIVE | Noted: 2017-08-18

## 2017-08-18 PROBLEM — K92.2 UPPER GI BLEED: Status: ACTIVE | Noted: 2017-08-18

## 2017-08-18 LAB
ALBUMIN SERPL BCP-MCNC: 1.8 G/DL
ALBUMIN SERPL BCP-MCNC: 1.8 G/DL
ALBUMIN SERPL BCP-MCNC: 1.9 G/DL
ALBUMIN SERPL BCP-MCNC: 1.9 G/DL
ALBUMIN SERPL BCP-MCNC: 2 G/DL
ALLENS TEST: ABNORMAL
ALLENS TEST: ABNORMAL
ALP SERPL-CCNC: 228 U/L
ALP SERPL-CCNC: 230 U/L
ALP SERPL-CCNC: 231 U/L
ALP SERPL-CCNC: 233 U/L
ALP SERPL-CCNC: 242 U/L
ALT SERPL W/O P-5'-P-CCNC: 104 U/L
ALT SERPL W/O P-5'-P-CCNC: 110 U/L
ALT SERPL W/O P-5'-P-CCNC: 113 U/L
ALT SERPL W/O P-5'-P-CCNC: 118 U/L
ALT SERPL W/O P-5'-P-CCNC: 127 U/L
ANION GAP SERPL CALC-SCNC: 11 MMOL/L
ANION GAP SERPL CALC-SCNC: 12 MMOL/L
ANION GAP SERPL CALC-SCNC: 13 MMOL/L
ANION GAP SERPL CALC-SCNC: 14 MMOL/L
ANION GAP SERPL CALC-SCNC: 9 MMOL/L
ANISOCYTOSIS BLD QL SMEAR: SLIGHT
APTT BLDCRRT: 30.4 SEC
AST SERPL-CCNC: 100 U/L
AST SERPL-CCNC: 110 U/L
AST SERPL-CCNC: 82 U/L
AST SERPL-CCNC: 90 U/L
AST SERPL-CCNC: 91 U/L
BACTERIA CATH TIP CULT: NO GROWTH
BASO STIPL BLD QL SMEAR: ABNORMAL
BASO STIPL BLD QL SMEAR: ABNORMAL
BASOPHILS # BLD AUTO: 0.02 K/UL
BASOPHILS # BLD AUTO: 0.03 K/UL
BASOPHILS NFR BLD: 0 %
BASOPHILS NFR BLD: 0.1 %
BASOPHILS NFR BLD: 0.1 %
BASOPHILS NFR BLD: 0.2 %
BASOPHILS NFR BLD: 0.2 %
BILIRUB DIRECT SERPL-MCNC: 1.7 MG/DL
BILIRUB SERPL-MCNC: 2.1 MG/DL
BILIRUB SERPL-MCNC: 2.1 MG/DL
BILIRUB SERPL-MCNC: 2.2 MG/DL
BILIRUB SERPL-MCNC: 2.3 MG/DL
BILIRUB SERPL-MCNC: 2.4 MG/DL
BLD PROD TYP BPU: NORMAL
BLOOD UNIT EXPIRATION DATE: NORMAL
BLOOD UNIT TYPE CODE: 5100
BLOOD UNIT TYPE: NORMAL
BNP SERPL-MCNC: 2538 PG/ML
BUN SERPL-MCNC: 108 MG/DL
BUN SERPL-MCNC: 108 MG/DL
BUN SERPL-MCNC: 112 MG/DL
BUN SERPL-MCNC: 117 MG/DL
BUN SERPL-MCNC: 118 MG/DL
CALCIUM SERPL-MCNC: 8 MG/DL
CALCIUM SERPL-MCNC: 8.1 MG/DL
CALCIUM SERPL-MCNC: 8.2 MG/DL
CALCIUM SERPL-MCNC: 8.3 MG/DL
CALCIUM SERPL-MCNC: 8.5 MG/DL
CHLORIDE SERPL-SCNC: 107 MMOL/L
CHLORIDE SERPL-SCNC: 108 MMOL/L
CHLORIDE SERPL-SCNC: 109 MMOL/L
CHLORIDE SERPL-SCNC: 109 MMOL/L
CHLORIDE SERPL-SCNC: 110 MMOL/L
CO2 SERPL-SCNC: 20 MMOL/L
CO2 SERPL-SCNC: 22 MMOL/L
CO2 SERPL-SCNC: 22 MMOL/L
CO2 SERPL-SCNC: 23 MMOL/L
CO2 SERPL-SCNC: 25 MMOL/L
CODING SYSTEM: NORMAL
CREAT SERPL-MCNC: 2.1 MG/DL
CREAT SERPL-MCNC: 2.2 MG/DL
CREAT SERPL-MCNC: 2.3 MG/DL
CREAT SERPL-MCNC: 2.4 MG/DL
CREAT SERPL-MCNC: 2.4 MG/DL
CRP SERPL-MCNC: 82.6 MG/L
DELSYS: ABNORMAL
DELSYS: ABNORMAL
DIFFERENTIAL METHOD: ABNORMAL
DISPENSE STATUS: NORMAL
EOSINOPHIL # BLD AUTO: 0.2 K/UL
EOSINOPHIL NFR BLD: 0 %
EOSINOPHIL NFR BLD: 1 %
EOSINOPHIL NFR BLD: 1.1 %
EOSINOPHIL NFR BLD: 1.3 %
EOSINOPHIL NFR BLD: 1.4 %
ERYTHROCYTE [DISTWIDTH] IN BLOOD BY AUTOMATED COUNT: 16.3 %
ERYTHROCYTE [DISTWIDTH] IN BLOOD BY AUTOMATED COUNT: 16.3 %
ERYTHROCYTE [DISTWIDTH] IN BLOOD BY AUTOMATED COUNT: 16.6 %
ERYTHROCYTE [DISTWIDTH] IN BLOOD BY AUTOMATED COUNT: 17 %
ERYTHROCYTE [DISTWIDTH] IN BLOOD BY AUTOMATED COUNT: 17.1 %
EST. GFR  (AFRICAN AMERICAN): 31.1 ML/MIN/1.73 M^2
EST. GFR  (AFRICAN AMERICAN): 31.1 ML/MIN/1.73 M^2
EST. GFR  (AFRICAN AMERICAN): 32.7 ML/MIN/1.73 M^2
EST. GFR  (AFRICAN AMERICAN): 34.6 ML/MIN/1.73 M^2
EST. GFR  (AFRICAN AMERICAN): 36.6 ML/MIN/1.73 M^2
EST. GFR  (NON AFRICAN AMERICAN): 26.9 ML/MIN/1.73 M^2
EST. GFR  (NON AFRICAN AMERICAN): 26.9 ML/MIN/1.73 M^2
EST. GFR  (NON AFRICAN AMERICAN): 28.3 ML/MIN/1.73 M^2
EST. GFR  (NON AFRICAN AMERICAN): 29.9 ML/MIN/1.73 M^2
EST. GFR  (NON AFRICAN AMERICAN): 31.6 ML/MIN/1.73 M^2
FLOW: 4
GIANT PLATELETS BLD QL SMEAR: PRESENT
GIANT PLATELETS BLD QL SMEAR: PRESENT
GLUCOSE SERPL-MCNC: 132 MG/DL
GLUCOSE SERPL-MCNC: 132 MG/DL
GLUCOSE SERPL-MCNC: 134 MG/DL
GLUCOSE SERPL-MCNC: 138 MG/DL
GLUCOSE SERPL-MCNC: 146 MG/DL
HCO3 UR-SCNC: 23.4 MMOL/L (ref 24–28)
HCO3 UR-SCNC: 26.5 MMOL/L (ref 24–28)
HCT VFR BLD AUTO: 20.1 %
HCT VFR BLD AUTO: 20.5 %
HCT VFR BLD AUTO: 23.3 %
HCT VFR BLD AUTO: 24.2 %
HCT VFR BLD AUTO: 24.2 %
HGB BLD-MCNC: 6.8 G/DL
HGB BLD-MCNC: 6.9 G/DL
HGB BLD-MCNC: 8 G/DL
HGB BLD-MCNC: 8.1 G/DL
HGB BLD-MCNC: 8.2 G/DL
HYPOCHROMIA BLD QL SMEAR: ABNORMAL
INR PPP: 1.4
LDH SERPL L TO P-CCNC: 264 U/L
LYMPHOCYTES # BLD AUTO: 1.1 K/UL
LYMPHOCYTES # BLD AUTO: 1.1 K/UL
LYMPHOCYTES # BLD AUTO: 1.2 K/UL
LYMPHOCYTES # BLD AUTO: 1.7 K/UL
LYMPHOCYTES NFR BLD: 5.8 %
LYMPHOCYTES NFR BLD: 5.9 %
LYMPHOCYTES NFR BLD: 6 %
LYMPHOCYTES NFR BLD: 8.5 %
LYMPHOCYTES NFR BLD: 9.9 %
MAGNESIUM SERPL-MCNC: 2 MG/DL
MAGNESIUM SERPL-MCNC: 2 MG/DL
MCH RBC QN AUTO: 27.6 PG
MCH RBC QN AUTO: 27.9 PG
MCH RBC QN AUTO: 27.9 PG
MCH RBC QN AUTO: 28 PG
MCH RBC QN AUTO: 28.6 PG
MCHC RBC AUTO-ENTMCNC: 33.5 G/DL
MCHC RBC AUTO-ENTMCNC: 33.7 G/DL
MCHC RBC AUTO-ENTMCNC: 33.8 G/DL
MCHC RBC AUTO-ENTMCNC: 33.9 G/DL
MCHC RBC AUTO-ENTMCNC: 34.3 G/DL
MCV RBC AUTO: 82 FL
MCV RBC AUTO: 82 FL
MCV RBC AUTO: 83 FL
MCV RBC AUTO: 83 FL
MCV RBC AUTO: 84 FL
MODE: ABNORMAL
MODE: ABNORMAL
MONOCYTES # BLD AUTO: 0.8 K/UL
MONOCYTES # BLD AUTO: 0.9 K/UL
MONOCYTES NFR BLD: 2.5 %
MONOCYTES NFR BLD: 4.2 %
MONOCYTES NFR BLD: 4.4 %
MONOCYTES NFR BLD: 4.4 %
MONOCYTES NFR BLD: 4.6 %
NEUTROPHILS # BLD AUTO: 14.1 K/UL
NEUTROPHILS # BLD AUTO: 15.9 K/UL
NEUTROPHILS # BLD AUTO: 16 K/UL
NEUTROPHILS # BLD AUTO: 18.4 K/UL
NEUTROPHILS NFR BLD: 83.9 %
NEUTROPHILS NFR BLD: 88.3 %
NEUTROPHILS NFR BLD: 88.5 %
NEUTROPHILS NFR BLD: 88.5 %
NEUTROPHILS NFR BLD: 88.7 %
NEUTS BAND NFR BLD MANUAL: 0.5 %
NRBC BLD-RTO: ABNORMAL /100 WBC
PCO2 BLDA: 36.3 MMHG (ref 35–45)
PCO2 BLDA: 43.3 MMHG (ref 35–45)
PH SMN: 7.39 [PH] (ref 7.35–7.45)
PH SMN: 7.42 [PH] (ref 7.35–7.45)
PHOSPHATE SERPL-MCNC: 3.8 MG/DL
PHOSPHATE SERPL-MCNC: 4.7 MG/DL
PLATELET # BLD AUTO: 172 K/UL
PLATELET # BLD AUTO: 173 K/UL
PLATELET # BLD AUTO: 183 K/UL
PLATELET # BLD AUTO: 184 K/UL
PLATELET # BLD AUTO: 205 K/UL
PLATELET BLD QL SMEAR: ABNORMAL
PMV BLD AUTO: 11.9 FL
PMV BLD AUTO: 12.1 FL
PMV BLD AUTO: 12.2 FL
PMV BLD AUTO: 12.3 FL
PMV BLD AUTO: 13.1 FL
PO2 BLDA: 115 MMHG (ref 80–100)
PO2 BLDA: 29 MMHG (ref 40–60)
POC BE: -1 MMOL/L
POC BE: 2 MMOL/L
POC SATURATED O2: 53 % (ref 95–100)
POC SATURATED O2: 99 % (ref 95–100)
POC TCO2: 24 MMOL/L (ref 23–27)
POC TCO2: 28 MMOL/L (ref 24–29)
POCT GLUCOSE: 125 MG/DL (ref 70–110)
POCT GLUCOSE: 125 MG/DL (ref 70–110)
POCT GLUCOSE: 139 MG/DL (ref 70–110)
POCT GLUCOSE: 142 MG/DL (ref 70–110)
POCT GLUCOSE: 147 MG/DL (ref 70–110)
POCT GLUCOSE: 154 MG/DL (ref 70–110)
POCT GLUCOSE: 160 MG/DL (ref 70–110)
POLYCHROMASIA BLD QL SMEAR: ABNORMAL
POTASSIUM SERPL-SCNC: 4.1 MMOL/L
POTASSIUM SERPL-SCNC: 4.1 MMOL/L
POTASSIUM SERPL-SCNC: 4.2 MMOL/L
POTASSIUM SERPL-SCNC: 4.2 MMOL/L
POTASSIUM SERPL-SCNC: 4.3 MMOL/L
PREALB SERPL-MCNC: 10 MG/DL
PROT SERPL-MCNC: 5.2 G/DL
PROT SERPL-MCNC: 5.2 G/DL
PROT SERPL-MCNC: 5.4 G/DL
PROT SERPL-MCNC: 5.4 G/DL
PROT SERPL-MCNC: 5.6 G/DL
PROTHROMBIN TIME: 14.2 SEC
RBC # BLD AUTO: 2.46 M/UL
RBC # BLD AUTO: 2.47 M/UL
RBC # BLD AUTO: 2.8 M/UL
RBC # BLD AUTO: 2.89 M/UL
RBC # BLD AUTO: 2.94 M/UL
SAMPLE: ABNORMAL
SAMPLE: ABNORMAL
SITE: ABNORMAL
SITE: ABNORMAL
SODIUM SERPL-SCNC: 141 MMOL/L
SODIUM SERPL-SCNC: 143 MMOL/L
SODIUM SERPL-SCNC: 144 MMOL/L
TRANS ERYTHROCYTES VOL PATIENT: NORMAL ML
WBC # BLD AUTO: 16.99 K/UL
WBC # BLD AUTO: 17.33 K/UL
WBC # BLD AUTO: 18.17 K/UL
WBC # BLD AUTO: 18.29 K/UL
WBC # BLD AUTO: 20.97 K/UL

## 2017-08-18 PROCEDURE — 25000003 PHARM REV CODE 250: Performed by: STUDENT IN AN ORGANIZED HEALTH CARE EDUCATION/TRAINING PROGRAM

## 2017-08-18 PROCEDURE — 99233 SBSQ HOSP IP/OBS HIGH 50: CPT | Mod: ,,, | Performed by: INTERNAL MEDICINE

## 2017-08-18 PROCEDURE — 93750 INTERROGATION VAD IN PERSON: CPT | Mod: ,,, | Performed by: THORACIC SURGERY (CARDIOTHORACIC VASCULAR SURGERY)

## 2017-08-18 PROCEDURE — 85610 PROTHROMBIN TIME: CPT

## 2017-08-18 PROCEDURE — 84100 ASSAY OF PHOSPHORUS: CPT | Mod: 91

## 2017-08-18 PROCEDURE — 63600175 PHARM REV CODE 636 W HCPCS: Performed by: STUDENT IN AN ORGANIZED HEALTH CARE EDUCATION/TRAINING PROGRAM

## 2017-08-18 PROCEDURE — 83615 LACTATE (LD) (LDH) ENZYME: CPT

## 2017-08-18 PROCEDURE — 99900035 HC TECH TIME PER 15 MIN (STAT)

## 2017-08-18 PROCEDURE — 27201040 HC RC 50 FILTER

## 2017-08-18 PROCEDURE — 80076 HEPATIC FUNCTION PANEL: CPT

## 2017-08-18 PROCEDURE — 85730 THROMBOPLASTIN TIME PARTIAL: CPT

## 2017-08-18 PROCEDURE — 63600175 PHARM REV CODE 636 W HCPCS

## 2017-08-18 PROCEDURE — 84100 ASSAY OF PHOSPHORUS: CPT

## 2017-08-18 PROCEDURE — 94799 UNLISTED PULMONARY SVC/PX: CPT

## 2017-08-18 PROCEDURE — 83735 ASSAY OF MAGNESIUM: CPT | Mod: 91

## 2017-08-18 PROCEDURE — 85025 COMPLETE CBC W/AUTO DIFF WBC: CPT | Mod: 91

## 2017-08-18 PROCEDURE — 83880 ASSAY OF NATRIURETIC PEPTIDE: CPT

## 2017-08-18 PROCEDURE — 83735 ASSAY OF MAGNESIUM: CPT

## 2017-08-18 PROCEDURE — 82803 BLOOD GASES ANY COMBINATION: CPT

## 2017-08-18 PROCEDURE — 84134 ASSAY OF PREALBUMIN: CPT

## 2017-08-18 PROCEDURE — 25000003 PHARM REV CODE 250: Performed by: THORACIC SURGERY (CARDIOTHORACIC VASCULAR SURGERY)

## 2017-08-18 PROCEDURE — 99233 SBSQ HOSP IP/OBS HIGH 50: CPT | Mod: ,,, | Performed by: SURGERY

## 2017-08-18 PROCEDURE — P9021 RED BLOOD CELLS UNIT: HCPCS

## 2017-08-18 PROCEDURE — A4216 STERILE WATER/SALINE, 10 ML: HCPCS | Performed by: THORACIC SURGERY (CARDIOTHORACIC VASCULAR SURGERY)

## 2017-08-18 PROCEDURE — 99233 SBSQ HOSP IP/OBS HIGH 50: CPT | Mod: 24,,, | Performed by: THORACIC SURGERY (CARDIOTHORACIC VASCULAR SURGERY)

## 2017-08-18 PROCEDURE — 97530 THERAPEUTIC ACTIVITIES: CPT

## 2017-08-18 PROCEDURE — 86140 C-REACTIVE PROTEIN: CPT

## 2017-08-18 PROCEDURE — 80048 BASIC METABOLIC PNL TOTAL CA: CPT

## 2017-08-18 PROCEDURE — 37799 UNLISTED PX VASCULAR SURGERY: CPT

## 2017-08-18 PROCEDURE — 97803 MED NUTRITION INDIV SUBSEQ: CPT

## 2017-08-18 PROCEDURE — 27000248 HC VAD-ADDITIONAL DAY

## 2017-08-18 PROCEDURE — 80053 COMPREHEN METABOLIC PANEL: CPT | Mod: 91

## 2017-08-18 PROCEDURE — C9113 INJ PANTOPRAZOLE SODIUM, VIA: HCPCS | Performed by: STUDENT IN AN ORGANIZED HEALTH CARE EDUCATION/TRAINING PROGRAM

## 2017-08-18 PROCEDURE — 20000000 HC ICU ROOM

## 2017-08-18 PROCEDURE — 93750 INTERROGATION VAD IN PERSON: CPT | Performed by: STUDENT IN AN ORGANIZED HEALTH CARE EDUCATION/TRAINING PROGRAM

## 2017-08-18 RX ORDER — FUROSEMIDE 10 MG/ML
INJECTION INTRAMUSCULAR; INTRAVENOUS
Status: COMPLETED
Start: 2017-08-18 | End: 2017-08-18

## 2017-08-18 RX ORDER — FUROSEMIDE 10 MG/ML
40 INJECTION INTRAMUSCULAR; INTRAVENOUS ONCE
Status: DISCONTINUED | OUTPATIENT
Start: 2017-08-18 | End: 2017-08-18

## 2017-08-18 RX ORDER — HEPARIN SODIUM 10000 [USP'U]/100ML
500 INJECTION, SOLUTION INTRAVENOUS CONTINUOUS
Status: DISCONTINUED | OUTPATIENT
Start: 2017-08-18 | End: 2017-08-29

## 2017-08-18 RX ORDER — FUROSEMIDE 10 MG/ML
80 INJECTION INTRAMUSCULAR; INTRAVENOUS ONCE
Status: COMPLETED | OUTPATIENT
Start: 2017-08-18 | End: 2017-08-18

## 2017-08-18 RX ORDER — HYDROCODONE BITARTRATE AND ACETAMINOPHEN 500; 5 MG/1; MG/1
TABLET ORAL
Status: DISCONTINUED | OUTPATIENT
Start: 2017-08-18 | End: 2017-08-18

## 2017-08-18 RX ADMIN — Medication 10 ML: at 06:08

## 2017-08-18 RX ADMIN — Medication 10 ML: at 12:08

## 2017-08-18 RX ADMIN — FUROSEMIDE 80 MG: 10 INJECTION, SOLUTION INTRAVENOUS at 10:08

## 2017-08-18 RX ADMIN — Medication 10 ML: at 01:08

## 2017-08-18 RX ADMIN — HEPARIN SODIUM AND DEXTROSE 100 UNITS/HR: 10000; 5 INJECTION INTRAVENOUS at 10:08

## 2017-08-18 RX ADMIN — FUROSEMIDE 80 MG: 10 INJECTION INTRAMUSCULAR; INTRAVENOUS at 10:08

## 2017-08-18 RX ADMIN — DOPAMINE HYDROCHLORIDE IN DEXTROSE 5 MCG/KG/MIN: 1.6 INJECTION, SOLUTION INTRAVENOUS at 04:08

## 2017-08-18 RX ADMIN — FUROSEMIDE 80 MG: 10 INJECTION, SOLUTION INTRAVENOUS at 12:08

## 2017-08-18 RX ADMIN — PANTOPRAZOLE SODIUM 8 MG/HR: 40 INJECTION, POWDER, FOR SOLUTION INTRAVENOUS at 08:08

## 2017-08-18 RX ADMIN — ERTAPENEM SODIUM 1 G: 1 INJECTION, POWDER, LYOPHILIZED, FOR SOLUTION INTRAMUSCULAR; INTRAVENOUS at 08:08

## 2017-08-18 RX ADMIN — CALCIUM GLUCONATE: 94 INJECTION, SOLUTION INTRAVENOUS at 09:08

## 2017-08-18 RX ADMIN — Medication 10 ML: at 05:08

## 2017-08-18 RX ADMIN — PANTOPRAZOLE SODIUM 8 MG/HR: 40 INJECTION, POWDER, FOR SOLUTION INTRAVENOUS at 09:08

## 2017-08-18 NOTE — SUBJECTIVE & OBJECTIVE
Interval History:   No complaints this am. Has bloody output from NGT    Continuous Infusions:   DOBUTamine 2.5 mcg/kg/min (08/18/17 1400)    DOPamine 3 mcg/kg/min (08/18/17 1400)    epinephrine infusion Stopped (08/15/17 1500)    heparin (porcine) in 5 % dex 100 Units/hr (08/18/17 1400)    pantoprazole 40 mg in dextrose 5 % 100 mL infusion (ready to mix system) 8 mg/hr (08/18/17 1400)    TPN ADULT CENTRAL LINE CUSTOM 50 mL/hr at 08/18/17 1400    TPN ADULT CENTRAL LINE CUSTOM       Scheduled Meds:   ertapenem (INVANZ) IVPB  1 g Intravenous Q24H    sodium chloride 0.9%  10 mL Intravenous Q6H    sodium chloride 0.9%  10 mL Intravenous Q6H     PRN Meds:albumin human 5%, albuterol sulfate, bisacodyl, dextrose 50%, glucagon (human recombinant), insulin aspart, ondansetron, Flushing PICC Protocol **AND** sodium chloride 0.9% **AND** sodium chloride 0.9%, Flushing PICC Protocol **AND** sodium chloride 0.9% **AND** sodium chloride 0.9%    Review of patient's allergies indicates:  No Known Allergies  Objective:     Vital Signs (Most Recent):  Temp: 97.8 °F (36.6 °C) (08/18/17 1100)  Pulse: 102 (08/18/17 1400)  Resp: 19 (08/18/17 1400)  BP: (!) 90/0 (08/18/17 1100)  SpO2: 96 % (08/18/17 0500) Vital Signs (24h Range):  Temp:  [97.8 °F (36.6 °C)-98.9 °F (37.2 °C)] 97.8 °F (36.6 °C)  Pulse:  [] 102  Resp:  [14-35] 19  SpO2:  [88 %-100 %] 96 %  BP: (80-90)/(0) 90/0  Arterial Line BP: ()/() 94/69     Weight: 95.6 kg (210 lb 12.2 oz)  Body mass index is 32.05 kg/m².      Intake/Output Summary (Last 24 hours) at 08/18/17 1459  Last data filed at 08/18/17 1400   Gross per 24 hour   Intake          1911.23 ml   Output             1595 ml   Net           316.23 ml       Hemodynamic Parameters:       Telemetry:     Physical Exam   Constitutional: He appears well-developed and well-nourished.   HENT:   Head: Normocephalic and atraumatic.   Eyes: EOM are normal. Pupils are equal, round, and reactive to light.    Cardiovascular: Normal rate, regular rhythm and normal heart sounds.  Exam reveals no gallop and no friction rub.    No murmur heard.  + LVAD hum    Pulmonary/Chest: Effort normal. No respiratory distress. He has no wheezes. He has no rales.   Abdominal: Soft. Bowel sounds are normal.   Musculoskeletal:   LUE Edema    Neurological: He is alert.   Nursing note and vitals reviewed.      Significant Labs:  CBC:    Recent Labs  Lab 08/18/17  1139   WBC 18.29*   RBC 2.94*   HGB 8.2*   HCT 24.2*      MCV 82   MCH 27.9   MCHC 33.9     BNP:    Recent Labs  Lab 08/18/17  0230   BNP 2,538*     CMP:    Recent Labs  Lab 08/18/17  1139   *   CALCIUM 8.5*   ALBUMIN 2.0*   PROT 5.6*      K 4.2   CO2 23      *   CREATININE 2.4*   ALKPHOS 242*   *   AST 91*   BILITOT 2.3*      Coagulation:     Recent Labs  Lab 08/18/17  0230   INR 1.4*   APTT 30.4     LDH:    Recent Labs  Lab 08/16/17  0405 08/17/17  0351 08/18/17  0230   * 283* 264*     Microbiology:  Microbiology Results (last 7 days)     Procedure Component Value Units Date/Time    IV catheter culture [[id number]] Collected:  08/16/17 1730    Order Status:  Completed Specimen:  Catheter Tip from Catheter Tip, PICC Updated:  08/18/17 1055     Aerobic Culture - Cath tip No growth    Blood culture [[id number]] Collected:  08/11/17 1326    Order Status:  Completed Specimen:  Blood from Peripheral, Forearm, Left Updated:  08/16/17 1812     Blood Culture, Routine No growth after 5 days.    Blood culture [[id number]] Collected:  08/09/17 0733    Order Status:  Completed Specimen:  Blood from Peripheral, Antecubital, Left Updated:  08/14/17 1022     Blood Culture, Routine No growth after 5 days.    Blood culture [[id number]]  (Susceptibility) Collected:  08/09/17 0813    Order Status:  Completed Specimen:  Blood from Line, Arterial, Right Updated:  08/12/17 1113     Blood Culture, Routine Gram stain clement bottle: Gram negative rods      Blood  Culture, Routine Results called to and read back by: Emma York RN 08/10/2017  11:13     Blood Culture, Routine ESCHERICHIA COLI ESBL          I have reviewed all pertinent labs within the past 24 hours.    Estimated Creatinine Clearance: 33.5 mL/min (based on Cr of 2.4).

## 2017-08-18 NOTE — PLAN OF CARE
Problem: Patient Care Overview  Goal: Plan of Care Review  Recommendations     Recommendation/Intervention:   1. Rec to re-order 250mls 20% intralipid so that pt's EEN can be met; without lipid, only 63% of EEN is being met; most recent triglyceride level was 105 on 8/16    2. RD to cont to monitor for ability to wean TPN    Goals: Meet >/=85% of EEN/EPN  Nutrition Goal Status: progressing towards goal

## 2017-08-18 NOTE — PROGRESS NOTES
[facility]  Heart Transplant  Progress Note    Patient Name: Suman Hayden  MRN: [id number]  Admission Date: 7/18/2017  Hospital Length of Stay: 31 days  Attending Physician: Sunny Downing MD  Primary Care Provider: Joe Ernst MD  Principal Problem:Acute on chronic combined systolic and diastolic heart failure    Subjective:     Interval History:   No complaints this am. Has bloody output from NGT    Continuous Infusions:   DOBUTamine 2.5 mcg/kg/min (08/18/17 1400)    DOPamine 3 mcg/kg/min (08/18/17 1400)    epinephrine infusion Stopped (08/15/17 1500)    heparin (porcine) in 5 % dex 100 Units/hr (08/18/17 1400)    pantoprazole 40 mg in dextrose 5 % 100 mL infusion (ready to mix system) 8 mg/hr (08/18/17 1400)    TPN ADULT CENTRAL LINE CUSTOM 50 mL/hr at 08/18/17 1400    TPN ADULT CENTRAL LINE CUSTOM       Scheduled Meds:   ertapenem (INVANZ) IVPB  1 g Intravenous Q24H    sodium chloride 0.9%  10 mL Intravenous Q6H    sodium chloride 0.9%  10 mL Intravenous Q6H     PRN Meds:albumin human 5%, albuterol sulfate, bisacodyl, dextrose 50%, glucagon (human recombinant), insulin aspart, ondansetron, Flushing PICC Protocol **AND** sodium chloride 0.9% **AND** sodium chloride 0.9%, Flushing PICC Protocol **AND** sodium chloride 0.9% **AND** sodium chloride 0.9%    Review of patient's allergies indicates:  No Known Allergies  Objective:     Vital Signs (Most Recent):  Temp: 97.8 °F (36.6 °C) (08/18/17 1100)  Pulse: 102 (08/18/17 1400)  Resp: 19 (08/18/17 1400)  BP: (!) 90/0 (08/18/17 1100)  SpO2: 96 % (08/18/17 0500) Vital Signs (24h Range):  Temp:  [97.8 °F (36.6 °C)-98.9 °F (37.2 °C)] 97.8 °F (36.6 °C)  Pulse:  [] 102  Resp:  [14-35] 19  SpO2:  [88 %-100 %] 96 %  BP: (80-90)/(0) 90/0  Arterial Line BP: ()/() 94/69     Weight: 95.6 kg (210 lb 12.2 oz)  Body mass index is 32.05 kg/m².      Intake/Output Summary (Last 24 hours) at 08/18/17 1459  Last data filed at 08/18/17  1400   Gross per 24 hour   Intake          1911.23 ml   Output             1595 ml   Net           316.23 ml       Hemodynamic Parameters:       Telemetry:     Physical Exam   Constitutional: He appears well-developed and well-nourished.   HENT:   Head: Normocephalic and atraumatic.   Eyes: EOM are normal. Pupils are equal, round, and reactive to light.   Cardiovascular: Normal rate, regular rhythm and normal heart sounds.  Exam reveals no gallop and no friction rub.    No murmur heard.  + LVAD hum    Pulmonary/Chest: Effort normal. No respiratory distress. He has no wheezes. He has no rales.   Abdominal: Soft. Bowel sounds are normal.   Musculoskeletal:   LUE Edema    Neurological: He is alert.   Nursing note and vitals reviewed.      Significant Labs:  CBC:    Recent Labs  Lab 08/18/17  1139   WBC 18.29*   RBC 2.94*   HGB 8.2*   HCT 24.2*      MCV 82   MCH 27.9   MCHC 33.9     BNP:    Recent Labs  Lab 08/18/17  0230   BNP 2,538*     CMP:    Recent Labs  Lab 08/18/17  1139   *   CALCIUM 8.5*   ALBUMIN 2.0*   PROT 5.6*      K 4.2   CO2 23      *   CREATININE 2.4*   ALKPHOS 242*   *   AST 91*   BILITOT 2.3*      Coagulation:     Recent Labs  Lab 08/18/17  0230   INR 1.4*   APTT 30.4     LDH:    Recent Labs  Lab 08/16/17  0405 08/17/17  0351 08/18/17  0230   * 283* 264*     Microbiology:  Microbiology Results (last 7 days)     Procedure Component Value Units Date/Time    IV catheter culture [[id number]] Collected:  08/16/17 1730    Order Status:  Completed Specimen:  Catheter Tip from Catheter Tip, PICC Updated:  08/18/17 1055     Aerobic Culture - Cath tip No growth    Blood culture [[id number]] Collected:  08/11/17 1326    Order Status:  Completed Specimen:  Blood from Peripheral, Forearm, Left Updated:  08/16/17 1812     Blood Culture, Routine No growth after 5 days.    Blood culture [[id number]] Collected:  08/09/17 0733    Order Status:  Completed Specimen:  Blood from  Peripheral, Antecubital, Left Updated:  08/14/17 1022     Blood Culture, Routine No growth after 5 days.    Blood culture [[id number]]  (Susceptibility) Collected:  08/09/17 0813    Order Status:  Completed Specimen:  Blood from Line, Arterial, Right Updated:  08/12/17 1113     Blood Culture, Routine Gram stain clement bottle: Gram negative rods      Blood Culture, Routine Results called to and read back by: Emma York RN 08/10/2017  11:13     Blood Culture, Routine ESCHERICHIA COLI ESBL          I have reviewed all pertinent labs within the past 24 hours.    Estimated Creatinine Clearance: 33.5 mL/min (based on Cr of 2.4).      Assessment and Plan:     LVAD (left ventricular assist device) present    - LVAD HM III. Placed this admit 7/27/17  - CTS Primary  - chest closure (7/28/17) and extubated (7/29/17)  -Reintubated 8/9/17 and extubated 8/13/17  -Now on Dopamine,   - Speed 5200  - LDH stable  - INR subtherpeutic. AC per CTS                    Upper GI bleed    -GI following. EGD done yesterday        Bacteremia    -ESBL from blood culture 8/9   -Ertapenem ( MERRITT < 0.5). ID following        Atrial fibrillation    -AC, Amio per C TS          Atrial tachycardia    - [id number] - 3  -Rec restarting Amio. AC per CTS        AICD discharge    - appropriate in the setting of VT aggravated by underlying AT/AFL (earlier during the admission)  - 7/28 - setting of AF undersense - device reprogrammed to VVI 80  - tachy therapy turned off  - Rec restarting Amio  - EP planning to do lead revision         Hepatitis B core antibody positive since 2012    - will defer liver biopsy as CTS not requiring it  - ID consult cleared the patient for sx  - case discussed with hepatology, low suspicion for liver involvement        V-tach    - Rec restarting Amio        Hyperlipidemia    - Rec resuming pravastatin once liver enzymes stabilize             Susannah Elizabeth PA-C  Heart Transplant  [facility]

## 2017-08-18 NOTE — NURSING
MELISSA Davalos notified of SVO2 value of 53. No new orders placed at this time. Will continue to monitor.

## 2017-08-18 NOTE — ASSESSMENT & PLAN NOTE
- LVAD HM III. Placed this admit 7/27/17  - CTS Primary  - chest closure (7/28/17) and extubated (7/29/17)  -Reintubated 8/9/17 and extubated 8/13/17  -Now on Dopamine,   - Speed 5200  - LDH stable  - INR subtherpeutic. AC per CTS

## 2017-08-18 NOTE — NURSING
MD Espinoza notified of UOP of 15 for this hour. No new orders placed at this time. Will continue to monitor.

## 2017-08-18 NOTE — PROGRESS NOTES
Daily E and M and VAD Interrogation Note    Reason for Visit:  Patient is seen in follow up for management of:  [] HeartMate II  [] Heartware [] Total artificial heart       [] ECMO           [x] Other - HM III     Interval History:  [] Interval history unobtainable due to intubation.  The [x] implant/[] explant date was 7/27/17    Events - No acute events overnight. EGD yesterday revealed hemorrhagic mucosa diffusely throughout stomach as well as a large amount of clot; duodenal mucosa appeared largely healthy. Remains somnolent this AM, but up in chair.               Review of Systems:   Negative except as above.      Medications:  Current Facility-Administered Medications   Medication Dose Route Frequency Provider Last Rate Last Dose    albumin human 5% bottle 500 mL  500 mL Intravenous PRN Shayne Espinoza MD   500 mL at 08/09/17 0700    albuterol nebulizer solution 2.5 mg  2.5 mg Nebulization Q4H PRN Shayne Espinoza MD        bisacodyl suppository 10 mg  10 mg Rectal Daily PRN Shayne Espinoza MD   10 mg at 08/06/17 2036    dextrose 50% injection 12.5 g  12.5 g Intravenous PRN Charbel Ritter MD        DOBUtamine 1000 mg in D5W 250 mL infusion (premix non-titrating)  2.5 mcg/kg/min (Dosing Weight) Intravenous Continuous Shayne Espinoza MD 3 mL/hr at 08/18/17 1400 2.5 mcg/kg/min at 08/18/17 1400    DOPamine 400 mg in dextrose 5 % 250 mL infusion (premix) (NON-TITRATING)  3 mcg/kg/min (Dosing Weight) Intravenous Continuous Shayne Espinoza MD 9 mL/hr at 08/18/17 1400 3 mcg/kg/min at 08/18/17 1400    EPINEPHrine (ADRENALIN) 4 mg in sodium chloride 0.9% 250 mL infusion  0.01 mcg/kg/min (Dosing Weight) Intravenous Continuous Shayne Espinoza MD   Stopped at 08/15/17 1500    ertapenem (INVANZ) 1 g in sodium chloride 0.9 % 100 mL IVPB (ready to mix system)  1 g Intravenous Q24H Edwige Clay  mL/hr at 08/17/17 2130 1 g at 08/17/17 2130    fentaNYL injection 50 mcg  50 mcg  Intravenous Q4H PRN Shayne Espinoza MD   50 mcg at 08/13/17 1310    glucagon (human recombinant) injection 1 mg  1 mg Intramuscular PRN Charbel Ritter MD        heparin 25,000 units in dextrose 5% 250 mL (100 units/mL) infusion (heparin infusion)  100 Units/hr Intravenous Continuous Shayne Espinoza MD 1 mL/hr at 08/18/17 1400 100 Units/hr at 08/18/17 1400    insulin aspart pen 0-5 Units  0-5 Units Subcutaneous Q4H PRN Charbel Ritter MD   2 Units at 08/10/17 0751    ondansetron injection 4 mg  4 mg Intravenous Q6H PRN Shayne Espinoza MD   4 mg at 08/08/17 2112    pantoprazole 40 mg in dextrose 5 % 100 mL infusion (ready to mix system)  8 mg/hr Intravenous Continuous Shayne Espinoza MD 20 mL/hr at 08/18/17 1400 8 mg/hr at 08/18/17 1400    sodium chloride 0.9% flush 10 mL  10 mL Intravenous Q6H Sunny Downing MD   10 mL at 08/18/17 1200    And    sodium chloride 0.9% flush 10 mL  10 mL Intravenous PRN Sunny Downing MD   10 mL at 08/10/17 1151    sodium chloride 0.9% flush 10 mL  10 mL Intravenous Q6H Sunny Downing MD   10 mL at 08/18/17 1200    And    sodium chloride 0.9% flush 10 mL  10 mL Intravenous PRN Sunny Downing MD        TPN ADULT CENTRAL LINE CUSTOM   Intravenous Continuous Shayne Espinoza MD 50 mL/hr at 08/18/17 1400      TPN ADULT CENTRAL LINE CUSTOM   Intravenous Continuous Avery Crystal MD         Physical Examination:  Vital Signs:   Vitals:    08/18/17 1400   BP:    Pulse: 102   Resp: 19   Temp:      Cardiovascular:  [x] Regular rate and rhythm [] Irregular []  None (MATT) []  Other  []  No edema [x]  Edema present  [x]  Clear to auscultation  []  Rales to []  Coarse  []  No rales but   [] Pedal Pulses absent  [x]  Pulses  + throughout  Skin:  Incision is [x]  Clean, dry and intact.  []  Other   Sternum:  [x]  Stable []  Unstable  Driveline(s):   [x]  Clean, dry and intact. []  Other     Labs:  BMP  Lab Results   Component Value Date     08/18/2017    K  4.2 08/18/2017     08/18/2017    CO2 23 08/18/2017     (H) 08/18/2017    CREATININE 2.4 (H) 08/18/2017    CALCIUM 8.5 (L) 08/18/2017    ANIONGAP 13 08/18/2017    ESTGFRAFRICA 31.1 (A) 08/18/2017    EGFRNONAA 26.9 (A) 08/18/2017     Magnesium   Date Value Ref Range Status   08/18/2017 2.0 1.6 - 2.6 mg/dL Final     Lab Results   Component Value Date    WBC 18.29 (H) 08/18/2017    HGB 8.2 (L) 08/18/2017    HCT 24.2 (L) 08/18/2017    MCV 82 08/18/2017     08/18/2017     Lab Results   Component Value Date    INR 1.4 (H) 08/18/2017    INR 1.8 (H) 08/17/2017    INR 2.4 (H) 08/16/2017     BNP   Date Value Ref Range Status   08/18/2017 2,538 (H) 0 - 99 pg/mL Final     Comment:     Values of less than 100 pg/ml are consistent with non-CHF populations.   08/16/2017 2,542 (H) 0 - 99 pg/mL Final     Comment:     Values of less than 100 pg/ml are consistent with non-CHF populations.   08/14/2017 2,873 (H) 0 - 99 pg/mL Final     Comment:     Values of less than 100 pg/ml are consistent with non-CHF populations.     LD   Date Value Ref Range Status   08/18/2017 264 (H) 110 - 260 U/L Final     Comment:     Results are increased in hemolyzed samples.   08/17/2017 283 (H) 110 - 260 U/L Final     Comment:     Results are increased in hemolyzed samples.   08/16/2017 547 (H) 110 - 260 U/L Final     Comment:     Results are increased in hemolyzed samples.     X-Rays:  [x]  I reviewed today's Chest x-ray    Procedure:  Device Interrogation including analysis of device parameters.  Current Settings   [x]  Ventricular Assist Device  []  Total Artificial Heart interrogated  Review of device function is [x]  Stable []  Other   TXP LVAD INTERROGATIONS 8/18/2017 8/18/2017 8/18/2017 8/18/2017 8/18/2017 8/18/2017 8/18/2017   Type HeartMate3 HeartMate3 HeartMate3 HeartMate3 HeartMate3 HeartMate3 HeartMate3   Flow 4.1 4.1 4.0 4.2 4.2 4.1 4.1   Speed 5200 5200 5200 5200 5200 5200 5200   PI 3.5 3.4 3.4 3.4 3.6 3.5 3.5   Power  ([place]) 3.6 3.7 3.5 3.4 3.6 3.8 3.5   LSL 4800 4800 4800 4800 4800 4800 4800   Pulsatility - - - - - - -   Some recent data might be hidden       Assessment:  [x]  Primary Cardiomyopathy []  Congestive Heart Failure   []  Atrial Fibrillation []  Ventricular Tachycardia   []  Aftercare cardiac device []  Long term (current) use of anticoagulants   []  Ventilator-associated pneumonia []  Pneumonia viral, unspecified   []  Pneumonia, bacterial, unspecified []  Pneumonia, organism unspecified   []  Hemorrhage of GI tract, unspecified    []  Nosebleed []  Driveline infection   []  Infection VAD device []  New onset of    []        Plan:  [x]  Interval history obtained from ICU attending team member during rounding today  []  VAD/MATT teaching performed with patient  []  Mobilization / Physical Therapy ongoing  []  Anticoagulation []  Ongoing []  Held  []  Studies ordered  []   To OR today for closure.       Total time spent was 30 minutes.  Of which more than 50 percent of the care dominated counseling and coordinating care with different team members. The VAD was interrogated and all parameters were WNL and no significant findings were found in the history. All these findings are documented in the note above.    Neuro:  - Alert and oriented    Resp:  - Extubated  - Resp cultures with ESBL - Ertapenem per ID   - Minimize supplemental O2  - Pulmonary toilet    CV:  - HDS; LVAD numbers stable  -  from 283  - Dobutamine - start at 2.5  - Dopamine at 3  - Vaso and epi weaned off  - Hold  given GI bleed  - LVAD numbers stable      Heme:  - Hgb/Hct stable  - Continue to trend  - INR 1.4 this AM   - PICC in place  - Holding ASA 2/2 bleeding  - Coumadin - hold given gastric dysmotility.   - Heparin - restart at 100 u/hr    ID:  - afebrile  - Ertapenem started for ESBL pos resp culture  - WBC 16.99  - PICC replaced 8/17  - Appreciate ID recs  - continue to monitor     Renal:  - Singer in place  - Strict I/Os   -  Adequate UOP  - Lasix gtt off  -  this AM   - Lasix 80 mg once - per nephro recs.     FEN/GI:  - Strict NPO  - Protonix 40 gtt  - Replace lytes PRN  - Continue TPN    Endo:  - Endocrine following, appreciate assistance  - Accuchecks  - Insulin ssi    Dispo:  - Continue ICU care.       Shayne Espinoza MD  General Surgery PGY-2  Pager: [phone]    Cardiac Surgery Attending E and M (VAD) Note along with VAD Interrogation    I have seen and examined the patient and agree with the findings above    I also reviewed the patients clinical course and:  [x]  Hemodynamic & Respiratory paramters  [x]  Laboratory Data  [x]  Radiological studies     VAD Interrogated [x]      VAD Function is normal. Changes made []  None [x]        Interrogation of Ventricular assist device was performed with physician analysis of device parameters and review of device function. I have personally reviewed the interrogation findings and agree with findings as stated

## 2017-08-18 NOTE — PLAN OF CARE
Problem: Patient Care Overview  Goal: Plan of Care Review  Outcome: Ongoing (interventions implemented as appropriate)  Pt remains drowsy but is oriented to place, time, and situation. No c/o pain throughout the night. NGT to LIWS with dark red drainage noted. Dobutatmine, Dopamine, TPN, and protonix gtt infusing. Pt received 1 units of PRBCs overnight for drop in H&H. UO 30-50cc/hr. No events noted with LVAD overnight. Pt is resting in bed. Wife at bedside. Will continue to monitor.

## 2017-08-18 NOTE — PROGRESS NOTES
"UPDATE    SW to pt's room for update. Pt presents as aaox3 with flat affect, sitting in chair at bedside. Pt's wife at bedside and reports she is coping adequately, however frustrated that pt "isn't fighting hard enough". Pt's wife states "He's fine and I'm fine, he just needs to work harder." Pt does not comment at this time. SW providing psychosocial and counseling support, education, resources, and d/c planning as needed. SW continuing to follow and remains available.    "

## 2017-08-18 NOTE — PROGRESS NOTES
LVAD dressing changed per spouse with RN present at bedside.  Yellow drainage noted on t-split gauze. Insertion site clean, dry and intact with no redness or drainage noted. Spouse redressed with sterile technique.

## 2017-08-18 NOTE — PROGRESS NOTES
MD Espinoza notified of bright red blood noted coming from NGT. MD came to bedside. Will obtain CBC at 1600 and continue to closely monitor. Pt denies pain. Will continue to monitor.

## 2017-08-18 NOTE — PT/OT/SLP PROGRESS
Physical Therapy  Co-Treatment with OT    Suman Hayden   MRN: [id number]   Admitting Diagnosis: Acute on chronic combined systolic and diastolic heart failure    PT Received On: 08/18/17  PT Start Time: 0817     PT Stop Time: 0838    PT Total Time (min): 21 min       Billable Minutes:  Therapeutic Activity 21 min    Treatment Type: Other (see comments) (co-treatment with OT)  PT/PTA: PT     PTA Visit Number: 0       General Precautions: Standard, fall, LVAD, sternal  Orthopedic Precautions: N/A   Braces:      Do you have any cultural, spiritual, Uatsdin conflicts, given your current situation?: none noted     Subjective:  Communicated with nurse prior to session.      Pain/Comfort  Pain Rating 1: 0/10  Pain Rating Post-Intervention 1: 0/10    Objective:   Patient found with: telemetry, arterial line, pulse ox (continuous), NG tube, oxygen, SCD, delgado catheter (VAD)    Functional Mobility:  Bed Mobility:   Rolling/Turning Right: Total assistance, With assist of 2 (pt needed verbal cues for functional mobility with sternal precautions.)  Supine to Sit: Total Assistance, With assist of 2 (pt sat on EOB x 5 min with total assist. )  Sit to Supine: Total Assistance, With assist of  2    Transfers: pt sat in chair 2.5 hours.  Sit <> Stand Assistance: Total Assistance (x2)  Sit <> Stand Assistive Device: No Assistive Device  Bed <> Chair Technique: Squat Pivot  Bed <> Chair Assistance: Total Assistance ( x 2)  Bed <> Chair Assistive Device: No Assistive Device    Gait:   Gait Distance: not tested. see above      AM-PAC 6 CLICK MOBILITY  How much help from another person does this patient currently need?   1 = Unable, Total/Dependent Assistance  2 = A lot, Maximum/Moderate Assistance  3 = A little, Minimum/Contact Guard/Supervision  4 = None, Modified Crane/Independent    Turning over in bed (including adjusting bedclothes, sheets and blankets)?: 2  Sitting down on and standing up from a chair with arms (e.g.,  wheelchair, bedside commode, etc.): 2  Moving from lying on back to sitting on the side of the bed?: 2  Moving to and from a bed to a chair (including a wheelchair)?: 2  Need to walk in hospital room?: 1  Climbing 3-5 steps with a railing?: 1  Total Score: 10    AM-PAC Raw Score CMS G-Code Modifier Level of Impairment Assistance   6 % Total / Unable   7 - 9 CM 80 - 100% Maximal Assist   10 - 14 CL 60 - 80% Moderate Assist   15 - 19 CK 40 - 60% Moderate Assist   20 - 22 CJ 20 - 40% Minimal Assist   23 CI 1-20% SBA / CGA   24 CH 0% Independent/ Mod I     Patient left supine with all lines intact, call button in reach and wife present.    Assessment:  Suman Hayden is a 67 y.o. male with a medical diagnosis of Acute on chronic combined systolic and diastolic heart failure and presents with decreased mobility, transfers, balance, strength, and decreased distance ambulated. Pt will benefit from skilled PT 6x/wk to progress physically and decrease complications from immobility.  Pt is s/p LVAD MH 3 placement 7/27 chest closure 7/28, re-intubation 8/9 and extubation 8/13/17.     Rehab identified problem list/impairments: Rehab identified problem list/impairments: weakness, impaired endurance, impaired functional mobilty, gait instability, impaired balance, decreased lower extremity function    Rehab potential is fair.    Activity tolerance: Fair    Discharge recommendations: Discharge Facility/Level Of Care Needs: rehabilitation facility     Barriers to discharge: Barriers to Discharge: Decreased caregiver support (wife will not be able to assist pt at current functional level. )    Equipment recommendations: Equipment Needed After Discharge:  (will determine DME needs closer to discharge.)     GOALS:    Physical Therapy Goals        Problem: Physical Therapy Goal    Goal Priority Disciplines Outcome Goal Variances Interventions   Physical Therapy Goal     PT/OT, PT Ongoing (interventions implemented as appropriate)      Description:  Goals to be met by: 17     Patient will increase functional independence with mobility by performin. Supine to sit with MInimal Assistance- not met  2. Sit to supine with MInimal Assistance - not met  3. Sit to stand transfer with Minimal Assistance- not met  4. Bed to chair transfer with Minimal Assistance- not met  5. Gait  x 50 feet with Minimal Assistance. - not met  6. Lower extremity exercise program x15 reps, with supervision, in order to increase LE strength and (I) with functional mobility. - not met                        PLAN:    Patient to be seen 6 x/week  to address the above listed problems via gait training, therapeutic activities, therapeutic exercises  Plan of Care expires: 17  Plan of Care reviewed with: patient, spouse         Astrid Whaley, PT  2017

## 2017-08-18 NOTE — NURSING
MD Ashley at bedside to evaluate NG drainage. Advised to clamp at this time. NG tube clamped. Will continue to monitor.

## 2017-08-18 NOTE — PLAN OF CARE
Problem: Occupational Therapy Goal  Goal: Occupational Therapy Goal  Goals to be met by:  2 weeks 8/28/17    Patient will increase functional independence with ADLs by performing:  Feeding: Independent   UE Dressing with Supervision.  LE Dressing with Supervision.  Grooming while standing with Supervision.  Toileting from toilet with Supervision for hygiene and clothing management.   Stand pivot transfers with Supervision.  Toilet transfer to toilet with Supervision.  Pt will be supervision  with LVAD yasmin't.       Outcome: Ongoing (interventions implemented as appropriate)  The above goals remain appropriate. DELMY Lucero  8/18/2017

## 2017-08-18 NOTE — PROCEDURES
TXP LVAD INTERROGATIONS 8/18/2017 8/18/2017 8/18/2017 8/18/2017 8/18/2017 8/18/2017 8/18/2017   Type HeartMate3 HeartMate3 HeartMate3 HeartMate3 HeartMate3 HeartMate3 HeartMate3   Flow 4.2 4.1 4.1 4.0 4.2 4.2 4.1   Speed 5200 5200 5200 5200 5200 5200 5200   PI 3.4 3.5 3.4 3.4 3.4 3.6 3.5   Power ([place]) 3.5 3.6 3.7 3.5 3.4 3.6 3.8   LSL 4800 4800 4800 4800 4800 4800 4800   Pulsatility - - - - - - -   Some recent data might be hidden     VAD sounds HM 3 smooth  HCT=24.2  No alarm noted in history  Pt awake, wife AAAO.  Spent approx 30 minutes at bedside providing emotional support to wife and VAD education to pt.  Continue with education daily.

## 2017-08-18 NOTE — PROGRESS NOTES
BRIEF RENAL STAFF NOTE    I saw and evaluated the patient this am.  Patient with nonoliguric INDIRA, likely ischemic (v. septic ATN) from renal hypoperfusion given cardiomyopathy.  Patient creatinine had remained stable, has maintained relative balance in I/O's, do not suspect elevated BUN indicative of uremia (elevation likely secondary to TPN administration and possible blood loss, agree with PRBC).  Discussed with primary team (Dr. Downing), who requested renal replacement therapy for clearance only.  Patient underwent SLED yesterday, will hold CRRT today pending EGD per GI.  Patient/wife voiced understanding of above.

## 2017-08-18 NOTE — PLAN OF CARE
Problem: Patient Care Overview  Goal: Individualization & Mutuality  Dx: LVAD    PMHx: CHF (EF=10%), IABP, HLD, HTN, Obesity, S/P implantation of automatic cardioverter/def, Hep B    PSHx:CARDIAC CATHETERIZATION, CARDIAC DEFIBRILLATOR PLACEMENT    7/27: LVAD; 1.5L albumin bolus, 1 u PRBC  7/28: to OR for chest closure, 1 u PRBC, 1L albumin, GLENN @ bedside  7/29: extubated    8/7:  2D Echo  8/8:transferred to CTSU  8/9: Rapid Response- abdominal pain/distension, SOB, hypotensive, elevated lactic, transferred to SICU at 0315, vomited became obtunded, emergently intubated, 2300cc blood tinged/brown OG drainage removed, lines placed, vaso, levo, epi, dopamine started. Nitric started. 2d Echo done. Overnight: 6 amps bicarb, 1 L albumin, 2 L NS, 1 PRBC given. Day shift: pan cx, repeat 2D echo, 2 amps bicarb, 1 L albumin, levo weaned off, nitric weaned, vent weaned  8/13: extubated   8/14: vaso off  8/16: 250 mL albumin, 1 unit PRBCs    Nursing:  *MAP >60  *q8hr ABG and lactic  *q4hr accuchecks  *q 6hr CBC & CMP  Night bath    Day LVAD dressing change     *Keep de-fib pads at bedside at all times, AICD does not work*                 - Patient VSS, resting comfortably in bed on 2 L NC.  - Patient arousable to voice, follows commands, and answers questions appropriately.  - Patient up to chair with PT.  - Heparin drip initiated at 100 units/hr   - Pt remains on dopamine 3 mcg/kg/min, dobutamine 2.5 mcg/kg/min, protonix 8 mg/hr, and TPN at 50.  - Pt given 80 mg Lasix once.   - No new skin breakdown noted  - LVAD dressing changed per spouse.  - LVAD numbers stable. Good flows. 4.0 - 4.2. Pulser index and pump power. 3.4 - 3.5  - Will continue to monitor.

## 2017-08-18 NOTE — PROGRESS NOTES
[facility]  Adult Nutrition  Progress Note    SUMMARY     Recommendations    Recommendation/Intervention:   1. Rec to re-order 250mls 20% intralipid so that pt's EEN can be met; without lipid, only 63% of EEN is being met; most recent triglyceride level was 105 on 8/16    2. RD to cont to monitor for ability to wean TPN    Goals: Meet >/=85% of EEN/EPN  Nutrition Goal Status: progressing towards goal  Communication of RD Recs: reviewed with RN    Reason for Assessment    Reason for Assessment: RD follow-up  Diagnosis:  (s/p LVAD)  Relevent Medical History: NICM, HTN, HLD   Interdisciplinary Rounds: did not attend     General Information Comments: S/p LVAD 7/27; developed INDIRA post-operatively; reintubated 8/9 then extubated 8/13; remains on NC. Has not required CRRT since 8/16. ST following but unable to assess 2/2 need for gut rest w/ elevated NG output     Nutrition Discharge Planning: Unable to determine    Nutrition Prescription Ordered    Current Diet Order: NPO  Nutrition Order Comments: -  Current Nutrition Support Formula Ordered: Other (Comment) (Custom TPN: 8.5% amino acid/20% dextrose)  Current Nutrition Support Rate Ordered: 50 (ml)  Current Nutrition Support Frequency Ordered: mL/hr  Oral Nutrition Supplement: -     Evaluation of Received Nutrients/Fluid Intake                Parenteral Calories (kcal): 1224  Parenteral Protein (gm): 102  Parenteral Fluid (mL): 1200           Other Calories (kcal): 0  Total Calories (kcal): 1224     Energy Calories Required: not meeting needs  % Kcal Needs: 63              Protein Required: meeting needs  % Protein Needs: 100     IV Fluid (mL):  (IVPB meds)     GIR (Glucose Infusion Rate) (mg/kg/min): 2.09 mg/kg/min  Lipid Calories (kcals): 0 kcals (lipid has not been re-ordered)  I/O: +396 mls x 24 hrs; fair uop; LBM 8/12         Fluid Required: other (see comments) (per MD)  Comments: NG output 700mls yest  Tolerance: tolerating  % Intake of  "Estimated Energy Needs: 50 - 75 %  % Meal Intake: NPO     Nutrition Risk Screen     Nutrition Risk Screen: no indicators present    Nutrition/Diet History    Patient Reported Diet/Restrictions/Preferences: general, low salt  Typical Food/Fluid Intake: TPN/NPO currently  Food Preferences: MARIE        Factors Affecting Nutritional Intake: NPO, altered gastrointestinal function                Labs/Tests/Procedures/Meds       Pertinent Labs Reviewed: reviewed  Pertinent Labs Comments: prealb 10, CRP 85.6, , Crt 2.3, GLu 138, POCT 160, WBC 20.97, tbili 2.1, Alb 1.8, trigl 105  Pertinent Medications Reviewed: reviewed  Pertinent Medications Comments: dobutamine, dopamine, lasix, pantoprazole    Physical Findings    Overall Physical Appearance: on oxygen therapy, overweight  Tubes: other (see comments) (OG removed when extubated)  Oral/Mouth Cavity: tooth/teeth missing  Skin: edema, incision    Anthropometrics    Temp: 97.8 °F (36.6 °C)     Height: 5' 8" (172.7 cm)  Weight Method: Bed Scale  Weight: 95.6 kg (210 lb 12.2 oz)  Ideal Body Weight (IBW), Male: 154 lb     % Ideal Body Weight, Male (lb): 136.86 lb     BMI (Calculated): 32.1  BMI Grade: 30 - 34.9- obesity - grade I     Usual Body Weight (UBW), k.8 kg (admit wt)     % Usual Body Weight: 115.78  % Weight Change From Usual Weight: -15.78 %             Estimated/Assessed Needs    Weight Used For Calorie Calculations: 79.9 kg (176 lb 2.4 oz) (dosing wt)      Energy Calorie Requirements (kcal):   Energy Need Method: [place]-St Jeor (x 1.25 (PAL))        RMR ([place]-St. Jeor Equation): 1548.5        Weight Used For Protein Calculations: 79.9 kg (176 lb 2.4 oz) (dosing wt)  Protein Requirements:  gms (1.2-1.4 gms/kg dosing wt)       Fluid Need Method: RDA Method, other (see comments) (Per MD or 1 mL/kcal)        RDA Method (mL):                Assessment and Plan    Nutrition Problem  Inadequate energy intake    Related to (etiology): "   suboptimal PN provision w/ no lipids    Signs and Symptoms (as evidenced by):   <85% of EEN being met    Interventions/Recommendations (treatment strategy):  See recs    Nutrition Diagnosis Status:   New        Monitor and Evaluation    Food and Nutrient Intake: parenteral nutrition intake, energy intake  Food and Nutrient Adminstration: diet order, enteral and parenteral nutrition administration     Physical Activity and Function: nutrition-related ADLs and IADLs  Anthropometric Measurements: weight, weight change, body mass index  Biochemical Data, Medical Tests and Procedures: gastrointestinal profile, electrolyte and renal panel, glucose/endocrine profile, lipid profile, inflammatory profile  Nutrition-Focused Physical Findings: overall appearance    Nutrition Risk    Level of Risk: other (see comments) (2x/week)    Nutrition Follow-Up    RD Follow-up?: Yes

## 2017-08-18 NOTE — PROGRESS NOTES
Progress Note  Surgical Intensive Care    Admit Date: 7/18/2017  Post-operative Day: 1 Day Post-Op  Hospital Day: 32    SUBJECTIVE:     Follow-up For:  Procedure(s) (LRB):  ESOPHAGOGASTRODUODENOSCOPY (EGD) (N/A)   S/p sternotomy closure 7/28/17    Interval history: Pain well controlled. Still with significant NGT output.       Continuous Infusions:   DOBUTamine 2.5 mcg/kg/min (08/18/17 1300)    DOPamine 3 mcg/kg/min (08/18/17 1300)    epinephrine infusion Stopped (08/15/17 1500)    heparin (porcine) in 5 % dex 100 Units/hr (08/18/17 1300)    pantoprazole 40 mg in dextrose 5 % 100 mL infusion (ready to mix system) 8 mg/hr (08/18/17 1300)    TPN ADULT CENTRAL LINE CUSTOM 50 mL/hr at 08/18/17 1300     Scheduled Meds:   ertapenem (INVANZ) IVPB  1 g Intravenous Q24H    sodium chloride 0.9%  10 mL Intravenous Q6H    sodium chloride 0.9%  10 mL Intravenous Q6H     PRN Meds:sodium chloride, sodium chloride, albumin human 5%, albuterol sulfate, bisacodyl, dextrose 50%, fentaNYL, glucagon (human recombinant), insulin aspart, ondansetron, Flushing PICC Protocol **AND** sodium chloride 0.9% **AND** sodium chloride 0.9%, Flushing PICC Protocol **AND** sodium chloride 0.9% **AND** sodium chloride 0.9%    Review of patient's allergies indicates:  No Known Allergies    OBJECTIVE:     Vital Signs (Most Recent)  Temp: 97.8 °F (36.6 °C) (08/18/17 1100)  Pulse: 100 (08/18/17 1300)  Resp: (!) 23 (08/18/17 1300)  BP: (!) 90/0 (08/18/17 1100)  SpO2: 96 % (08/18/17 0500)    Vital Signs Range (Last 24H):  Temp:  [97.8 °F (36.6 °C)-98.9 °F (37.2 °C)]   Pulse:  []   Resp:  [14-35]   BP: (80-90)/(0)   SpO2:  [88 %-100 %]   Arterial Line BP: ()/()     I & O (Last 24H):    Intake/Output Summary (Last 24 hours) at 08/18/17 1305  Last data filed at 08/18/17 1300   Gross per 24 hour   Intake          1911.23 ml   Output             1430 ml   Net           481.23 ml        Physical Exam    Constitutional: He appears  well-developed and well-nourished. No distress. He is alert  HENT:   Head: Normocephalic and atraumatic.    Neck: Normal range of motion. Neck supple.   Cardiovascular: Intact distal pulses.    LVAD hum present.   Pulmonary/Chest: Effort normal. No respiratory distress..   Abdominal: Soft. He exhibits no distension.   Skin: Skin is warm and dry.     Laboratory (Last 24H):  CBC:    Recent Labs  Lab 08/18/17  1139   WBC 18.29*   HGB 8.2*   HCT 24.2*        CMP:    Recent Labs  Lab 08/18/17  0721   CALCIUM 8.0*   ALBUMIN 1.8*   PROT 5.2*      K 4.1   CO2 22*      *   CREATININE 2.3*   ALKPHOS 230*   *   AST 90*   BILITOT 2.1*     Echo    CONCLUSIONS     1 - Heartmate III LVAD; speed 5100, aortic valve opens intermittently, septum is midline.     2 - Severe left atrial enlargement.     3 - Mild left ventricular enlargement.     4 - Severely depressed left ventricular systolic function (EF 10-15%).     5 - Segmental wall motion abnormalities.     6 - Mildly depressed right ventricular systolic function .     7 - Mild tricuspid regurgitation.     ASSESSMENT/PLAN:     Neuro:  - alert and responding to commands  - sedation off    Resp:  - stable on NC  - Possible aspiration event causing sepsis - resp cultures growing ESBL   - wean supplemental O2 as tolerated     CV:  - HDS; LVAD numbers stable  - Dopamine 5, may switch to dobutamine, mgmt per CTS  - epi and levo off  - Hold  given GI bleed  - f/u Echo    Heme:  - Hgb/Hct 7.5/22.7  - 1U pRBCs  - Continue to trend  - INR down to 1.8  - Heparin off    ID:  - afebrile  - WBC 26.6  - Likely aspiration pneumonia   - bacteremic and resp cultures ESBL+, on ertapenem  - ID consulted  - blood cx 8/11 NGTD      Renal:  - Singer in place  - Strict I/Os   - UOP moderate at 30cc/hr  - Cr 1,7, monitor closely  - CRRT off     FEN/GI:  - Protonix BID for possible GIB  - Replace lytes PRN  - holding miralax     Endo:  - Endocrine following  -  Accuchecks  - SSI     Dispo:  - Continue ICU care  - wean drips as tolerated    Avery Crystal  CA2  P: [phone]

## 2017-08-18 NOTE — PT/OT/SLP PROGRESS
Occupational Therapy  Treatment    Suman Hayden   MRN: [id number]   Admitting Diagnosis: Acute on chronic combined systolic and diastolic heart failure    OT Date of Treatment: 08/18/17   OT Start Time: 0815 (1100 second visit)  OT Stop Time: 0837 (1117 second visit)  OT Total Time (min): 22 min    Billable Minutes:  Therapeutic Activity 23    General Precautions: Standard, LVAD, fall, sternal  Orthopedic Precautions:    Braces:           Subjective:  Communicated with RN prior to session.    Pain/Comfort  Pain Rating 1: 0/10  Pain Rating Post-Intervention 1: 0/10    Objective:  Patient found with: arterial line, blood pressure cuff, delgado catheter, telemetry, pulse ox (continuous), PICC line, NG tube     Functional Mobility:  Bed Mobility:  Rolling/Turning Right: Total assistance  Scooting/Bridging: Total Assistance  Supine to Sit: Total Assistance  Sit to Supine: Total Assistance    Transfers:   Sit <> Stand Assistance: Total Assistance (x 2 persons from EOB)  Sit <> Stand Assistive Device: No Assistive Device  Bed <> Chair Technique: Stand Pivot  Bed <> Chair Transfer Assistance: Total Assistance, Other (see comments) (x 2 persons)    Functional Ambulation: NT    Activities of Daily Living:  Feeding Level of Assistance: Activity did not occur  Feeding adaptive equipment:   UE Dressing Level of Assistance: Total assistance  UE adaptive equipment:   LE Dressing Level of Assistance: Total assistance  LE adaptive equipment:   Grooming Position: Seated  Grooming Level of Assistance: Total assistance  Toileting Where Assessed: Bed level  Toileting Level of Assistance: Total assistance     Therapeutic Activities and Exercises:  Pt sat EOB with total A for head and trunk control. Pt required max stimulation to keep eyes open (RN stated that pt did not have anything to warrant extreme lethargy) Pt required total A for all tasks seated EOB. SPT with total A x 2 performed to chair; pt tolerated OOBTC x 2 1/2 hours. Pt alert  "upon return to assist back to bed and SqPT with total A x 2 performed    AM-PAC 6 CLICK ADL   How much help from another person does this patient currently need?   1 = Unable, Total/Dependent Assistance  2 = A lot, Maximum/Moderate Assistance  3 = A little, Minimum/Contact Guard/Supervision  4 = None, Modified Casa/Independent    Putting on and taking off regular lower body clothing? : 1  Bathing (including washing, rinsing, drying)?: 1  Toileting, which includes using toilet, bedpan, or urinal? : 1  Putting on and taking off regular upper body clothing?: 1  Taking care of personal grooming such as brushing teeth?: 1  Eating meals?: 1  Total Score: 6     AM-PAC Raw Score CMS "G-Code Modifier Level of Impairment Assistance   6 % Total / Unable   7 - 8 CM 80 - 100% Maximal Assist   9-13 CL 60 - 80% Moderate Assist   14 - 19 CK 40 - 60% Moderate Assist   20 - 22 CJ 20 - 40% Minimal Assist   23 CI 1-20% SBA / CGA   24 CH 0% Independent/ Mod I       Patient left supine with all lines intact, call button in reach, RN notified and spouse present    ASSESSMENT:  Suman Hayden is a 67 y.o. male with a medical diagnosis of Acute on chronic combined systolic and diastolic heart failure and presents with profound weakness and lethargy. Pt requiring increased assistance with all self-care, balance and transfers this session.    Rehab identified problem list/impairments: Rehab identified problem list/impairments: weakness, impaired endurance, impaired self care skills, impaired balance, gait instability, impaired functional mobilty, impaired cardiopulmonary response to activity, decreased upper extremity function    Rehab potential is good.    Activity tolerance: Fair    Discharge recommendations: Discharge Facility/Level Of Care Needs: rehabilitation facility     Barriers to discharge: Barriers to Discharge: Decreased caregiver support    Equipment recommendations:  (TBD closer to d/c)     GOALS:    Occupational " Therapy Goals        Problem: Occupational Therapy Goal    Goal Priority Disciplines Outcome Interventions   Occupational Therapy Goal     OT, PT/OT Ongoing (interventions implemented as appropriate)    Description:  Goals to be met by:  2 weeks 8/28/17    Patient will increase functional independence with ADLs by performing:  Feeding: Independent   UE Dressing with Supervision.  LE Dressing with Supervision.  Grooming while standing with Supervision.  Toileting from toilet with Supervision for hygiene and clothing management.   Stand pivot transfers with Supervision.  Toilet transfer to toilet with Supervision.  Pt will be supervision  with LVAD yasmin't.                  Multidisciplinary Problems (Resolved)        Problem: Occupational Therapy Goal    Goal Priority Disciplines Outcome Interventions   Occupational Therapy Goal   (Resolved)     OT, PT/OT  Error    Description:  Goals to be met by: 8/04/2017    Patient will increase functional independence with ADLs by performing:    Increased functional strength to 5/5 for improved ADL performance.  Upper extremity exercise program and R LE ankle pumps  3x 10 reps per handout, with independence.                      Plan:  Patient to be seen 6 x/week to address the above listed problems via therapeutic activities, therapeutic exercises, self-care/home management  Plan of Care expires: 08/29/17  Plan of Care reviewed with: patient, spouse         Sammi DELMY Malloy  08/18/2017

## 2017-08-18 NOTE — PT/OT/SLP PROGRESS
Speech Language Pathology      Suman Hayden  MRN: [id number]   [id number] A       Patient not seen today secondary to Other (Comment) (NPO per primary team due to GI concerns). Will follow-up Monday, per plan of care.    SHERRI Hart, CCC-SLP, [facility], 8/18/2017

## 2017-08-19 LAB
ABO + RH BLD: NORMAL
ALBUMIN SERPL BCP-MCNC: 1.8 G/DL
ALBUMIN SERPL BCP-MCNC: 1.8 G/DL
ALBUMIN SERPL BCP-MCNC: 1.9 G/DL
ALBUMIN SERPL BCP-MCNC: 1.9 G/DL
ALP SERPL-CCNC: 249 U/L
ALP SERPL-CCNC: 256 U/L
ALP SERPL-CCNC: 256 U/L
ALP SERPL-CCNC: 262 U/L
ALT SERPL W/O P-5'-P-CCNC: 81 U/L
ALT SERPL W/O P-5'-P-CCNC: 87 U/L
ALT SERPL W/O P-5'-P-CCNC: 95 U/L
ALT SERPL W/O P-5'-P-CCNC: 96 U/L
ANION GAP SERPL CALC-SCNC: 14 MMOL/L
ANISOCYTOSIS BLD QL SMEAR: SLIGHT
APTT BLDCRRT: 28.1 SEC
APTT BLDCRRT: 28.3 SEC
AST SERPL-CCNC: 72 U/L
AST SERPL-CCNC: 79 U/L
AST SERPL-CCNC: 89 U/L
AST SERPL-CCNC: 89 U/L
BASOPHILS # BLD AUTO: 0.01 K/UL
BASOPHILS # BLD AUTO: 0.02 K/UL
BASOPHILS # BLD AUTO: 0.04 K/UL
BASOPHILS NFR BLD: 0.1 %
BASOPHILS NFR BLD: 0.1 %
BASOPHILS NFR BLD: 0.2 %
BILIRUB SERPL-MCNC: 1.9 MG/DL
BILIRUB SERPL-MCNC: 2 MG/DL
BLD GP AB SCN CELLS X3 SERPL QL: NORMAL
BUN SERPL-MCNC: 115 MG/DL
BUN SERPL-MCNC: 116 MG/DL
BUN SERPL-MCNC: 116 MG/DL
BUN SERPL-MCNC: 118 MG/DL
BUN SERPL-MCNC: 123 MG/DL
CALCIUM SERPL-MCNC: 8.1 MG/DL
CALCIUM SERPL-MCNC: 8.2 MG/DL
CALCIUM SERPL-MCNC: 8.2 MG/DL
CALCIUM SERPL-MCNC: 8.4 MG/DL
CALCIUM SERPL-MCNC: 8.5 MG/DL
CHLORIDE SERPL-SCNC: 106 MMOL/L
CHLORIDE SERPL-SCNC: 107 MMOL/L
CO2 SERPL-SCNC: 21 MMOL/L
CO2 SERPL-SCNC: 22 MMOL/L
CREAT SERPL-MCNC: 2.6 MG/DL
CREAT SERPL-MCNC: 2.7 MG/DL
CREAT SERPL-MCNC: 2.7 MG/DL
CREAT SERPL-MCNC: 2.8 MG/DL
CREAT SERPL-MCNC: 2.8 MG/DL
DIFFERENTIAL METHOD: ABNORMAL
EOSINOPHIL # BLD AUTO: 0.2 K/UL
EOSINOPHIL # BLD AUTO: 0.3 K/UL
EOSINOPHIL NFR BLD: 1.4 %
EOSINOPHIL NFR BLD: 1.5 %
EOSINOPHIL NFR BLD: 1.5 %
EOSINOPHIL NFR BLD: 1.7 %
EOSINOPHIL NFR BLD: 1.7 %
ERYTHROCYTE [DISTWIDTH] IN BLOOD BY AUTOMATED COUNT: 16.6 %
ERYTHROCYTE [DISTWIDTH] IN BLOOD BY AUTOMATED COUNT: 17 %
ERYTHROCYTE [DISTWIDTH] IN BLOOD BY AUTOMATED COUNT: 17 %
ERYTHROCYTE [DISTWIDTH] IN BLOOD BY AUTOMATED COUNT: 17.1 %
ERYTHROCYTE [DISTWIDTH] IN BLOOD BY AUTOMATED COUNT: 17.2 %
EST. GFR  (AFRICAN AMERICAN): 25.8 ML/MIN/1.73 M^2
EST. GFR  (AFRICAN AMERICAN): 25.8 ML/MIN/1.73 M^2
EST. GFR  (AFRICAN AMERICAN): 27 ML/MIN/1.73 M^2
EST. GFR  (AFRICAN AMERICAN): 27 ML/MIN/1.73 M^2
EST. GFR  (AFRICAN AMERICAN): 28.2 ML/MIN/1.73 M^2
EST. GFR  (NON AFRICAN AMERICAN): 22.3 ML/MIN/1.73 M^2
EST. GFR  (NON AFRICAN AMERICAN): 22.3 ML/MIN/1.73 M^2
EST. GFR  (NON AFRICAN AMERICAN): 23.3 ML/MIN/1.73 M^2
EST. GFR  (NON AFRICAN AMERICAN): 23.3 ML/MIN/1.73 M^2
EST. GFR  (NON AFRICAN AMERICAN): 24.4 ML/MIN/1.73 M^2
GIANT PLATELETS BLD QL SMEAR: PRESENT
GLUCOSE SERPL-MCNC: 118 MG/DL
GLUCOSE SERPL-MCNC: 121 MG/DL
GLUCOSE SERPL-MCNC: 123 MG/DL
GLUCOSE SERPL-MCNC: 123 MG/DL
GLUCOSE SERPL-MCNC: 132 MG/DL
HCT VFR BLD AUTO: 23 %
HCT VFR BLD AUTO: 23.1 %
HCT VFR BLD AUTO: 23.1 %
HCT VFR BLD AUTO: 23.2 %
HCT VFR BLD AUTO: 23.6 %
HGB BLD-MCNC: 7.6 G/DL
HGB BLD-MCNC: 7.8 G/DL
HGB BLD-MCNC: 7.8 G/DL
HGB BLD-MCNC: 7.9 G/DL
HGB BLD-MCNC: 7.9 G/DL
HYPOCHROMIA BLD QL SMEAR: ABNORMAL
INR PPP: 1.3
LDH SERPL L TO P-CCNC: 308 U/L
LYMPHOCYTES # BLD AUTO: 1.2 K/UL
LYMPHOCYTES # BLD AUTO: 1.3 K/UL
LYMPHOCYTES NFR BLD: 6.9 %
LYMPHOCYTES NFR BLD: 7.3 %
LYMPHOCYTES NFR BLD: 7.4 %
MAGNESIUM SERPL-MCNC: 2 MG/DL
MCH RBC QN AUTO: 27.6 PG
MCH RBC QN AUTO: 27.8 PG
MCH RBC QN AUTO: 28.4 PG
MCHC RBC AUTO-ENTMCNC: 33 G/DL
MCHC RBC AUTO-ENTMCNC: 33.5 G/DL
MCHC RBC AUTO-ENTMCNC: 33.8 G/DL
MCHC RBC AUTO-ENTMCNC: 33.8 G/DL
MCHC RBC AUTO-ENTMCNC: 34.1 G/DL
MCV RBC AUTO: 82 FL
MCV RBC AUTO: 82 FL
MCV RBC AUTO: 83 FL
MCV RBC AUTO: 84 FL
MCV RBC AUTO: 84 FL
MONOCYTES # BLD AUTO: 0.6 K/UL
MONOCYTES # BLD AUTO: 0.7 K/UL
MONOCYTES # BLD AUTO: 0.7 K/UL
MONOCYTES # BLD AUTO: 0.8 K/UL
MONOCYTES # BLD AUTO: 0.8 K/UL
MONOCYTES NFR BLD: 3.6 %
MONOCYTES NFR BLD: 4.1 %
MONOCYTES NFR BLD: 4.1 %
MONOCYTES NFR BLD: 4.7 %
MONOCYTES NFR BLD: 4.8 %
NEUTROPHILS # BLD AUTO: 14.5 K/UL
NEUTROPHILS # BLD AUTO: 14.9 K/UL
NEUTROPHILS # BLD AUTO: 14.9 K/UL
NEUTROPHILS # BLD AUTO: 15.5 K/UL
NEUTROPHILS # BLD AUTO: 15.5 K/UL
NEUTROPHILS NFR BLD: 86.3 %
NEUTROPHILS NFR BLD: 86.3 %
NEUTROPHILS NFR BLD: 87.1 %
NEUTROPHILS NFR BLD: 87.1 %
NEUTROPHILS NFR BLD: 87.9 %
PHOSPHATE SERPL-MCNC: 5 MG/DL
PLATELET # BLD AUTO: 199 K/UL
PLATELET # BLD AUTO: 202 K/UL
PLATELET # BLD AUTO: 202 K/UL
PLATELET # BLD AUTO: 219 K/UL
PLATELET # BLD AUTO: 220 K/UL
PLATELET BLD QL SMEAR: ABNORMAL
PMV BLD AUTO: 12.7 FL
PMV BLD AUTO: 12.8 FL
PMV BLD AUTO: 12.8 FL
PMV BLD AUTO: 13 FL
PMV BLD AUTO: 13.1 FL
POCT GLUCOSE: 113 MG/DL (ref 70–110)
POCT GLUCOSE: 116 MG/DL (ref 70–110)
POCT GLUCOSE: 120 MG/DL (ref 70–110)
POCT GLUCOSE: 130 MG/DL (ref 70–110)
POCT GLUCOSE: 132 MG/DL (ref 70–110)
POCT GLUCOSE: 135 MG/DL (ref 70–110)
POIKILOCYTOSIS BLD QL SMEAR: SLIGHT
POLYCHROMASIA BLD QL SMEAR: ABNORMAL
POTASSIUM SERPL-SCNC: 4 MMOL/L
POTASSIUM SERPL-SCNC: 4.1 MMOL/L
PROT SERPL-MCNC: 5.4 G/DL
PROT SERPL-MCNC: 5.4 G/DL
PROT SERPL-MCNC: 5.6 G/DL
PROT SERPL-MCNC: 5.6 G/DL
PROTHROMBIN TIME: 13.3 SEC
RBC # BLD AUTO: 2.75 M/UL
RBC # BLD AUTO: 2.78 M/UL
RBC # BLD AUTO: 2.81 M/UL
RBC # BLD AUTO: 2.81 M/UL
RBC # BLD AUTO: 2.84 M/UL
SCHISTOCYTES BLD QL SMEAR: ABNORMAL
SODIUM SERPL-SCNC: 141 MMOL/L
SODIUM SERPL-SCNC: 142 MMOL/L
SODIUM SERPL-SCNC: 143 MMOL/L
TARGETS BLD QL SMEAR: ABNORMAL
WBC # BLD AUTO: 16.9 K/UL
WBC # BLD AUTO: 17.02 K/UL
WBC # BLD AUTO: 17.37 K/UL
WBC # BLD AUTO: 17.88 K/UL
WBC # BLD AUTO: 17.88 K/UL

## 2017-08-19 PROCEDURE — 85730 THROMBOPLASTIN TIME PARTIAL: CPT | Mod: 91

## 2017-08-19 PROCEDURE — C9113 INJ PANTOPRAZOLE SODIUM, VIA: HCPCS | Performed by: STUDENT IN AN ORGANIZED HEALTH CARE EDUCATION/TRAINING PROGRAM

## 2017-08-19 PROCEDURE — 63600175 PHARM REV CODE 636 W HCPCS: Performed by: STUDENT IN AN ORGANIZED HEALTH CARE EDUCATION/TRAINING PROGRAM

## 2017-08-19 PROCEDURE — 99233 SBSQ HOSP IP/OBS HIGH 50: CPT | Mod: ,,, | Performed by: INTERNAL MEDICINE

## 2017-08-19 PROCEDURE — 85610 PROTHROMBIN TIME: CPT

## 2017-08-19 PROCEDURE — 86920 COMPATIBILITY TEST SPIN: CPT

## 2017-08-19 PROCEDURE — 99233 SBSQ HOSP IP/OBS HIGH 50: CPT | Mod: ,,, | Performed by: SURGERY

## 2017-08-19 PROCEDURE — 99232 SBSQ HOSP IP/OBS MODERATE 35: CPT | Mod: ,,, | Performed by: INTERNAL MEDICINE

## 2017-08-19 PROCEDURE — 85730 THROMBOPLASTIN TIME PARTIAL: CPT

## 2017-08-19 PROCEDURE — 63600175 PHARM REV CODE 636 W HCPCS: Performed by: THORACIC SURGERY (CARDIOTHORACIC VASCULAR SURGERY)

## 2017-08-19 PROCEDURE — 20000000 HC ICU ROOM

## 2017-08-19 PROCEDURE — 86901 BLOOD TYPING SEROLOGIC RH(D): CPT

## 2017-08-19 PROCEDURE — 25000003 PHARM REV CODE 250: Performed by: THORACIC SURGERY (CARDIOTHORACIC VASCULAR SURGERY)

## 2017-08-19 PROCEDURE — 25000003 PHARM REV CODE 250: Performed by: STUDENT IN AN ORGANIZED HEALTH CARE EDUCATION/TRAINING PROGRAM

## 2017-08-19 PROCEDURE — 80053 COMPREHEN METABOLIC PANEL: CPT | Mod: 91

## 2017-08-19 PROCEDURE — 83735 ASSAY OF MAGNESIUM: CPT

## 2017-08-19 PROCEDURE — 27000248 HC VAD-ADDITIONAL DAY

## 2017-08-19 PROCEDURE — 85025 COMPLETE CBC W/AUTO DIFF WBC: CPT

## 2017-08-19 PROCEDURE — A4216 STERILE WATER/SALINE, 10 ML: HCPCS | Performed by: THORACIC SURGERY (CARDIOTHORACIC VASCULAR SURGERY)

## 2017-08-19 PROCEDURE — 93750 INTERROGATION VAD IN PERSON: CPT | Mod: ,,, | Performed by: INTERNAL MEDICINE

## 2017-08-19 PROCEDURE — 83615 LACTATE (LD) (LDH) ENZYME: CPT

## 2017-08-19 PROCEDURE — 86900 BLOOD TYPING SEROLOGIC ABO: CPT

## 2017-08-19 PROCEDURE — 97530 THERAPEUTIC ACTIVITIES: CPT

## 2017-08-19 PROCEDURE — 84100 ASSAY OF PHOSPHORUS: CPT

## 2017-08-19 RX ORDER — FUROSEMIDE 10 MG/ML
80 INJECTION INTRAMUSCULAR; INTRAVENOUS ONCE
Status: COMPLETED | OUTPATIENT
Start: 2017-08-19 | End: 2017-08-19

## 2017-08-19 RX ADMIN — CALCIUM GLUCONATE: 94 INJECTION, SOLUTION INTRAVENOUS at 09:08

## 2017-08-19 RX ADMIN — ERTAPENEM SODIUM 1 G: 1 INJECTION, POWDER, LYOPHILIZED, FOR SOLUTION INTRAMUSCULAR; INTRAVENOUS at 09:08

## 2017-08-19 RX ADMIN — PANTOPRAZOLE SODIUM 8 MG/HR: 40 INJECTION, POWDER, FOR SOLUTION INTRAVENOUS at 10:08

## 2017-08-19 RX ADMIN — PANTOPRAZOLE SODIUM 8 MG/HR: 40 INJECTION, POWDER, FOR SOLUTION INTRAVENOUS at 05:08

## 2017-08-19 RX ADMIN — DOPAMINE HYDROCHLORIDE IN DEXTROSE 3 MCG/KG/MIN: 1.6 INJECTION, SOLUTION INTRAVENOUS at 05:08

## 2017-08-19 RX ADMIN — Medication 10 ML: at 11:08

## 2017-08-19 RX ADMIN — FUROSEMIDE 80 MG: 10 INJECTION, SOLUTION INTRAVENOUS at 10:08

## 2017-08-19 RX ADMIN — PANTOPRAZOLE SODIUM 8 MG/HR: 40 INJECTION, POWDER, FOR SOLUTION INTRAVENOUS at 02:08

## 2017-08-19 RX ADMIN — PANTOPRAZOLE SODIUM 8 MG/HR: 40 INJECTION, POWDER, FOR SOLUTION INTRAVENOUS at 01:08

## 2017-08-19 NOTE — PROGRESS NOTES
Daily E and M and VAD Interrogation Note    Reason for Visit:  Patient is seen in follow up for management of:  [] HeartMate II  [] Heartware [] Total artificial heart       [] ECMO           [x] Other - HM III     Interval History:  [] Interval history unobtainable due to intubation.  The [x] implant/[] explant date was 7/27/17    Events - Bleeding decreased in NGT.                Review of Systems:   Negative except as above.      Medications:  Current Facility-Administered Medications   Medication Dose Route Frequency Provider Last Rate Last Dose    albumin human 5% bottle 500 mL  500 mL Intravenous PRN Shayne Espinoza MD   500 mL at 08/09/17 0700    albuterol nebulizer solution 2.5 mg  2.5 mg Nebulization Q4H PRN Shayne Espinoza MD        bisacodyl suppository 10 mg  10 mg Rectal Daily PRN Shayne Espinoza MD   10 mg at 08/06/17 2036    dextrose 50% injection 12.5 g  12.5 g Intravenous PRN Charbel Ritter MD        DOBUtamine 1000 mg in D5W 250 mL infusion (premix non-titrating)  2.5 mcg/kg/min (Dosing Weight) Intravenous Continuous Shayne Espinoza MD 3 mL/hr at 08/19/17 1200 2.5 mcg/kg/min at 08/19/17 1200    DOPamine 400 mg in dextrose 5 % 250 mL infusion (premix) (NON-TITRATING)  3 mcg/kg/min (Dosing Weight) Intravenous Continuous Shayne Espinoza MD 9 mL/hr at 08/19/17 1200 3 mcg/kg/min at 08/19/17 1200    EPINEPHrine (ADRENALIN) 4 mg in sodium chloride 0.9% 250 mL infusion  0.01 mcg/kg/min (Dosing Weight) Intravenous Continuous Shayne Espinoza MD   Stopped at 08/15/17 1500    ertapenem (INVANZ) 1 g in sodium chloride 0.9 % 100 mL IVPB (ready to mix system)  1 g Intravenous Q24H Edwige Clay  mL/hr at 08/18/17 2009 1 g at 08/18/17 2009    glucagon (human recombinant) injection 1 mg  1 mg Intramuscular PRN Charbel Ritter MD        heparin 25,000 units in dextrose 5% 250 mL (100 units/mL) infusion (heparin infusion)  100 Units/hr Intravenous Continuous Shayne  Howard Espinoza MD 1 mL/hr at 08/19/17 1200 100 Units/hr at 08/19/17 1200    insulin aspart pen 0-5 Units  0-5 Units Subcutaneous Q4H PRN Charbel Ritter MD   2 Units at 08/10/17 0751    ondansetron injection 4 mg  4 mg Intravenous Q6H PRN Shayne Espinoza MD   4 mg at 08/08/17 2112    pantoprazole 40 mg in dextrose 5 % 100 mL infusion (ready to mix system)  8 mg/hr Intravenous Continuous Shayne Espinoza MD 20 mL/hr at 08/19/17 1200 8 mg/hr at 08/19/17 1200    sodium chloride 0.9% flush 10 mL  10 mL Intravenous Q6H Sunny Downing MD   10 mL at 08/19/17 1145    And    sodium chloride 0.9% flush 10 mL  10 mL Intravenous PRN Sunny Downing MD   10 mL at 08/10/17 1151    sodium chloride 0.9% flush 10 mL  10 mL Intravenous Q6H Sunny Downing MD   10 mL at 08/18/17 1200    And    sodium chloride 0.9% flush 10 mL  10 mL Intravenous PRN Sunny Downing MD        TPN ADULT CENTRAL LINE CUSTOM   Intravenous Continuous Avery Crystal MD 50 mL/hr at 08/19/17 1200      TPN ADULT CENTRAL LINE CUSTOM   Intravenous Continuous Mary Kumar MD         Physical Examination:  Vital Signs:   Vitals:    08/19/17 1215   BP:    Pulse: 104   Resp: (!) 22   Temp:      Cardiovascular:  [x] Regular rate and rhythm [] Irregular []  None (MATT) []  Other  []  No edema [x]  Edema present  [x]  Clear to auscultation  []  Rales to []  Coarse  []  No rales but   [] Pedal Pulses absent  [x]  Pulses  + throughout  Skin:  Incision is [x]  Clean, dry and intact.  []  Other   Sternum:  [x]  Stable []  Unstable  Driveline(s):   [x]  Clean, dry and intact. []  Other     Labs:  BMP  Lab Results   Component Value Date     08/19/2017     08/19/2017    K 4.0 08/19/2017    K 4.0 08/19/2017     08/19/2017     08/19/2017    CO2 22 (L) 08/19/2017    CO2 22 (L) 08/19/2017     (H) 08/19/2017     (H) 08/19/2017    CREATININE 2.7 (H) 08/19/2017    CREATININE 2.7 (H) 08/19/2017    CALCIUM 8.2 (L) 08/19/2017     CALCIUM 8.2 (L) 08/19/2017    ANIONGAP 14 08/19/2017    ANIONGAP 14 08/19/2017    ESTGFRAFRICA 27.0 (A) 08/19/2017    ESTGFRAFRICA 27.0 (A) 08/19/2017    EGFRNONAA 23.3 (A) 08/19/2017    EGFRNONAA 23.3 (A) 08/19/2017     Magnesium   Date Value Ref Range Status   08/19/2017 2.0 1.6 - 2.6 mg/dL Final     Lab Results   Component Value Date    WBC 17.88 (H) 08/19/2017    WBC 17.88 (H) 08/19/2017    HGB 7.9 (L) 08/19/2017    HCT 23.2 (L) 08/19/2017    MCV 84 08/19/2017     08/19/2017     Lab Results   Component Value Date    INR 1.3 (H) 08/19/2017    INR 1.4 (H) 08/18/2017    INR 1.8 (H) 08/17/2017     BNP   Date Value Ref Range Status   08/18/2017 2,538 (H) 0 - 99 pg/mL Final     Comment:     Values of less than 100 pg/ml are consistent with non-CHF populations.   08/16/2017 2,542 (H) 0 - 99 pg/mL Final     Comment:     Values of less than 100 pg/ml are consistent with non-CHF populations.   08/14/2017 2,873 (H) 0 - 99 pg/mL Final     Comment:     Values of less than 100 pg/ml are consistent with non-CHF populations.     LD   Date Value Ref Range Status   08/19/2017 308 (H) 110 - 260 U/L Final     Comment:     Results are increased in hemolyzed samples.   08/18/2017 264 (H) 110 - 260 U/L Final     Comment:     Results are increased in hemolyzed samples.   08/17/2017 283 (H) 110 - 260 U/L Final     Comment:     Results are increased in hemolyzed samples.     X-Rays:  [x]  I reviewed today's Chest x-ray    Procedure:  Device Interrogation including analysis of device parameters.  Current Settings   [x]  Ventricular Assist Device  []  Total Artificial Heart interrogated  Review of device function is [x]  Stable []  Other   TXP LVAD INTERROGATIONS 8/19/2017 8/19/2017 8/19/2017 8/19/2017 8/19/2017 8/19/2017 8/19/2017   Type HeartMate3 HeartMate3 HeartMate3 HeartMate3 HeartMate3 HeartMate3 HeartMate3   Flow 4.2 4.0 4.1 4.1 4.0 4.1 4.2   Speed 5200 5200 5200 5200 5200 5250 5200   PI 3.5 3.9 3.7 3.6 3.9 3.6 3.6   Power  ([place]) 3.6 3.6 3.6 3.6 3.6 3.7 3.5   LSL 4800 4800 4800 4800 4800 4800 4800   Pulsatility - - - - - - -   Some recent data might be hidden       Assessment:  [x]  Primary Cardiomyopathy [x]  Congestive Heart Failure   []  Atrial Fibrillation []  Ventricular Tachycardia   [x]  Aftercare cardiac device [x]  Long term (current) use of anticoagulants   []  Ventilator-associated pneumonia []  Pneumonia viral, unspecified   []  Pneumonia, bacterial, unspecified []  Pneumonia, organism unspecified   [x]  Hemorrhage of GI tract, unspecified    []  Nosebleed []  Driveline infection   []  Infection VAD device []  New onset of    []        Plan:  [x]  Interval history obtained from ICU attending team member during rounding today  [x]  VAD/MATT teaching performed with patient  [x]  Mobilization / Physical Therapy ongoing  [x]  Anticoagulation [x]  Ongoing []  Held  []  Studies ordered  []   To OR today for closure.       Total time spent was 30 minutes.  Of which more than 50 percent of the care dominated counseling and coordinating care with different team members. The VAD was interrogated and all parameters were WNL and no significant findings were found in the history. All these findings are documented in the note above.    Neuro:  - Alert and oriented    Resp:  - Extubated  - Resp cultures with ESBL - Ertapenem per ID   - Minimize supplemental O2  - Pulmonary toilet    CV:  - HDS; LVAD numbers stable  - LDH from 283 to 308  - Dobutamine - 2.5  - Dopamine at 3  - Vaso and epi weaned off  - Hold  given GI bleed  - LVAD numbers stable      Heme:  - Hgb/Hct stable  - Continue to trend  - INR 1.3 this AM   - PICC in place  - Holding ASA 2/2 bleeding  - Coumadin - hold given gastric dysmotility.   - Heparin - PTT 30-40 today    ID:  - afebrile  - Ertapenem started for ESBL pos resp culture  - WBC 16.99  - PICC replaced 8/17  - Appreciate ID recs  - continue to monitor     Renal:  - Singer in place  - Strict I/Os   -  Adequate UOP  - Lasix gtt off  -  this AM   - Lasix 80 mg BID     FEN/GI:  - Strict NPO  - Protonix 40 gtt  - Replace lytes PRN  - Continue TPN    Endo:  - Endocrine following, appreciate assistance  - Accuchecks  - Insulin ssi    Dispo:  - Continue ICU care.     Date of service:  08/19/2017     Avery Rice MD

## 2017-08-19 NOTE — PLAN OF CARE
Problem: Patient Care Overview  Goal: Plan of Care Review  No acute events throughout shift. All VSS, pt remains afebrile. Pt tolerating 2L NC well. LVAD speed remains 5200, see flowsheet for VAD trends. Heparin, dobutamine, dopamine, TPN, protonix infusing per order. Adequate UOP throughout shift. NG with ~300 cc dark red/brown drainage, MD aware. LVAD dressing changed today, site clean dry and intact. Pt OOBTC throughout shift, tolerated well. Pt and spouse updated on current plan of care. All questions answered. See flowsheet for full assessment details. Will continue to monitor

## 2017-08-19 NOTE — PLAN OF CARE
Problem: Patient Care Overview  Goal: Plan of Care Review  Outcome: Ongoing (interventions implemented as appropriate)  No acute events overnight. VSS. Pt remains on 2L nasal cannula SpO2 intermittently picks up and has been %. POCT glucoses montiored no insulin coverage needed. Gtts infusing: TPN at 50ml/hr, Dobutamine at 2.5mcg/kg/hr, Dopamine at 3mcg/kg/min, Protonix at 8mg/hr, and heparin at 100U/hr. 80mg Lasix IVP given with adequate response in UO. CVP 14. Labs trended. See VAD flowsheets for details. See flowsheets for intake and output and assessment details Plan of care reviewed with patient and patients spouse. Will continue to monitor

## 2017-08-19 NOTE — ASSESSMENT & PLAN NOTE
Patient with nonoliguric INDIRA, likely ischemic (v. septic ATN) from renal hypoperfusion given cardiomyopathy.  Patient creatinine has remained stable, has maintained relative balance in I/O's, do not suspect elevated BUN indicative of uremia (elevation likely secondary to TPN administration and possible blood loss, agree with PRBC).  Discussed with primary team (Dr. Downing), requested renal replacement therapy for clearance only.  Patient seen and examined on SLED 8.16, 6hours, no net UF to prevent any hemodynamic instability that may lead to further renal injury.  BUN/Cr slightly elevated from yesterday, however, would recommend furosemide 80mg IV bid given initial response to one time dose, will continue to monitor labs, I/O's closely.  Patient/wife voiced understanding of above.

## 2017-08-19 NOTE — PROGRESS NOTES
MD Tran notified of nephrology recs of repeating 80mg lasix IVP at 2200. MD to place orders. Will continue to monitor.

## 2017-08-19 NOTE — SUBJECTIVE & OBJECTIVE
Interval History: Patient more responsive to questioning (especially to his wife's questions), denies any respiratory distress this am.     Review of patient's allergies indicates:  No Known Allergies  Current Facility-Administered Medications   Medication Frequency    albumin human 5% bottle 500 mL PRN    albuterol nebulizer solution 2.5 mg Q4H PRN    bisacodyl suppository 10 mg Daily PRN    dextrose 50% injection 12.5 g PRN    DOBUtamine 1000 mg in D5W 250 mL infusion (premix non-titrating) Continuous    DOPamine 400 mg in dextrose 5 % 250 mL infusion (premix) (NON-TITRATING) Continuous    EPINEPHrine (ADRENALIN) 4 mg in sodium chloride 0.9% 250 mL infusion Continuous    ertapenem (INVANZ) 1 g in sodium chloride 0.9 % 100 mL IVPB (ready to mix system) Q24H    glucagon (human recombinant) injection 1 mg PRN    heparin 25,000 units in dextrose 5% 250 mL (100 units/mL) infusion (heparin infusion) Continuous    insulin aspart pen 0-5 Units Q4H PRN    ondansetron injection 4 mg Q6H PRN    pantoprazole 40 mg in dextrose 5 % 100 mL infusion (ready to mix system) Continuous    sodium chloride 0.9% flush 10 mL Q6H    And    sodium chloride 0.9% flush 10 mL PRN    sodium chloride 0.9% flush 10 mL Q6H    And    sodium chloride 0.9% flush 10 mL PRN    TPN ADULT CENTRAL LINE CUSTOM Continuous       Objective:     Vital Signs (Most Recent):  Temp: 99 °F (37.2 °C) (08/18/17 2300)  Pulse: 103 (08/18/17 2330)  Resp: 18 (08/18/17 2330)  BP: (!) 89/0 (08/18/17 2300)  SpO2: 98 % (08/18/17 2300)  O2 Device (Oxygen Therapy): nasal cannula (08/18/17 2300) Vital Signs (24h Range):  Temp:  [97.8 °F (36.6 °C)-99.1 °F (37.3 °C)] 99 °F (37.2 °C)  Pulse:  [] 103  Resp:  [14-28] 18  SpO2:  [90 %-98 %] 98 %  BP: (88-90)/(0) 89/0  Arterial Line BP: ()/() 94/68     Weight: 95.6 kg (210 lb 12.2 oz) (08/18/17 1400)  Body mass index is 32.05 kg/m².  Body surface area is 2.14 meters squared.    I/O last 3 completed  shifts:  In: 3081.4 [I.V.:1508.2; Blood:250]  Out: 2495 [Urine:1695; Drains:800]    Physical Exam   Constitutional: He appears well-developed and well-nourished.   Cardiovascular: Exam reveals no gallop and no friction rub.    No murmur heard.  Mechanical device sounds   Pulmonary/Chest: Effort normal and breath sounds normal. No respiratory distress. He has no wheezes. He has no rales.   Abdominal: Soft. There is no tenderness.   Musculoskeletal: He exhibits edema.   Neurological: He is alert.   Skin: Skin is warm and dry. No rash noted.   Vitals reviewed.      Significant Labs:   Reviewed    Significant Imaging:  Reviewed

## 2017-08-19 NOTE — PROGRESS NOTES
Progress Note  Surgical Intensive Care    Admit Date: 7/18/2017  Post-operative Day: 2 Days Post-Op  Hospital Day: 33    SUBJECTIVE:     Follow-up For:  Procedure(s) (LRB):  ESOPHAGOGASTRODUODENOSCOPY (EGD) (N/A)   S/p sternotomy closure 7/28/17    Interval history: Pain well controlled. Still with significant NGT output.       Continuous Infusions:   DOBUTamine 2.5 mcg/kg/min (08/19/17 0600)    DOPamine 3 mcg/kg/min (08/19/17 0600)    epinephrine infusion Stopped (08/15/17 1500)    heparin (porcine) in 5 % dex 100 Units/hr (08/19/17 0600)    pantoprazole 40 mg in dextrose 5 % 100 mL infusion (ready to mix system) 8 mg/hr (08/19/17 0600)    TPN ADULT CENTRAL LINE CUSTOM 50 mL/hr at 08/19/17 0600     Scheduled Meds:   ertapenem (INVANZ) IVPB  1 g Intravenous Q24H    sodium chloride 0.9%  10 mL Intravenous Q6H    sodium chloride 0.9%  10 mL Intravenous Q6H     PRN Meds:albumin human 5%, albuterol sulfate, bisacodyl, dextrose 50%, glucagon (human recombinant), insulin aspart, ondansetron, Flushing PICC Protocol **AND** sodium chloride 0.9% **AND** sodium chloride 0.9%, Flushing PICC Protocol **AND** sodium chloride 0.9% **AND** sodium chloride 0.9%    Review of patient's allergies indicates:  No Known Allergies    OBJECTIVE:     Vital Signs (Most Recent)  Temp: 98.9 °F (37.2 °C) (08/19/17 0300)  Pulse: 107 (08/19/17 0600)  Resp: (!) 24 (08/19/17 0600)  BP: (!) 88/0 (08/19/17 0300)  SpO2: 95 % (08/19/17 0300)    Vital Signs Range (Last 24H):  Temp:  [97.8 °F (36.6 °C)-99.1 °F (37.3 °C)]   Pulse:  []   Resp:  [14-28]   BP: (88-90)/(0)   SpO2:  [95 %-98 %]   Arterial Line BP: ()/(59-84)     I & O (Last 24H):    Intake/Output Summary (Last 24 hours) at 08/19/17 0719  Last data filed at 08/19/17 0600   Gross per 24 hour   Intake          2106.29 ml   Output             2500 ml   Net          -393.71 ml        Physical Exam    Constitutional: He appears well-developed and well-nourished. No distress.  He is alert  HENT:   Head: Normocephalic and atraumatic.    Neck: Normal range of motion. Neck supple.   Cardiovascular: Intact distal pulses.    LVAD hum present.   Pulmonary/Chest: Effort normal. No respiratory distress..   Abdominal: Soft. He exhibits no distension.   Skin: Skin is warm and dry.     Laboratory (Last 24H):  CBC:    Recent Labs  Lab 08/19/17  0355   WBC 17.88*  17.88*   HGB 7.8*  7.8*   HCT 23.1*  23.1*     202     CMP:    Recent Labs  Lab 08/19/17  0355   CALCIUM 8.2*  8.2*   ALBUMIN 1.8*   PROT 5.4*     142   K 4.0  4.0   CO2 22*  22*     106   *  116*   CREATININE 2.7*  2.7*   ALKPHOS 262*   ALT 96*   AST 89*   BILITOT 2.0*     Echo    CONCLUSIONS     1 - Heartmate III LVAD; speed 5100, aortic valve opens intermittently, septum is midline.     2 - Severe left atrial enlargement.     3 - Mild left ventricular enlargement.     4 - Severely depressed left ventricular systolic function (EF 10-15%).     5 - Segmental wall motion abnormalities.     6 - Mildly depressed right ventricular systolic function .     7 - Mild tricuspid regurgitation.     ASSESSMENT/PLAN:     Neuro:  - alert and responding to commands  - sedation off    Resp:  - stable on NC  - Possible aspiration event causing sepsis - resp cultures growing ESBL   - wean supplemental O2 as tolerated     CV:  - HDS; LVAD numbers stable  - Dopamine 5, may switch to dobutamine, mgmt per CTS  - epi and levo off  - Hold  given GI bleed  - f/u Echo    Heme:  - Hgb/Hct 7.5/22.7  - 1U pRBCs  - Continue to trend  - INR down to 1.8  - Heparin off    ID:  - afebrile  - WBC 26.6  - Likely aspiration pneumonia   - bacteremic and resp cultures ESBL+, on ertapenem  - ID consulted  - blood cx 8/11 NGTD      Renal:  - Singer in place  - Strict I/Os   - UOP moderate at 30cc/hr  - Cr 1,7, monitor closely  - CRRT off     FEN/GI:  - Protonix BID for possible GIB  - Replace lytes PRN  - holding miralax     Endo:  -  Endocrine following  - Accuchecks  - SSI     Dispo:  - Continue ICU care  - wean drips as tolerated    Avery Crystal  CA2  P: [phone]

## 2017-08-19 NOTE — NURSING
CTS at bedside. Updated on current gtts, VS and VAD trends. Pt resting comfortably on 2L NC. No new orders given at this time. Pt and spouse updated on current plan. All questions answered. Will continue to monitor

## 2017-08-19 NOTE — ASSESSMENT & PLAN NOTE
Patient with nonoliguric INDIRA, likely ischemic (v. septic ATN) from renal hypoperfusion given cardiomyopathy.  Patient creatinine has remained stable, has maintained relative balance in I/O's, do not suspect elevated BUN indicative of uremia (elevation likely secondary to TPN administration and possible blood loss, agree with PRBC).  Discussed with primary team (Dr. Downing), requested renal replacement therapy for clearance only.  Patient seen and examined on SLED 8.16, 6hours, no net UF to prevent any hemodynamic instability that may have lead to further renal injury.  BUN/Cr slightly elevated from yesterday, however, would recommend furosemide 80mg IV bid, will continue to monitor labs, I/O's closely.  Patient/wife voiced understanding of above.

## 2017-08-19 NOTE — PROGRESS NOTES
[facility]  Nephrology  Progress Note    Patient Name: Suman Hayden  MRN: [id number]  Admission Date: 7/18/2017  Hospital Length of Stay: 31 days  Attending Provider: Sunny Downing MD   Primary Care Physician: Joe Ernst MD  Principal Problem:Acute on chronic combined systolic and diastolic heart failure    Subjective:     HPI: Mr. Will is a 66 yo AAM with PMHx of NICM, HTN, HLD, and CKD III who presented to the hospital on 7/18 after syncopal episode at home.  He underwent LVAD placement on 7/27.  Labs that day showed a spike in his SCr to 1.5, but he quickly returned to baseline (1.2-1.3) and stayed stable until 08/01 when he increased to 1.5 and has steadily remained above baseline since, up to 2.3 on consult.  He has remained on lasix infusion with intermittent dosages of diuril to aid in diuresis.  On 08/3, there was a reported decrease in LVAD flows which resolved with decrease in lasix infusion and administration of 1 unit of PRBCs, likely causing decrease renal perfusion leading to an ischemic event.  He continues with adequate UOP on lasix gtt. He was transferred to the floor from ICU on 08/08 and was noted to become hypotensive with a doppler pressure of 58, since been requiring pressors for BP support.  Nephrology was consulted for INDIRA and decreased UOP.    Interval History: Patient more responsive to questioning (especially to his wife's questions), denies any respiratory distress this am.     Review of patient's allergies indicates:  No Known Allergies  Current Facility-Administered Medications   Medication Frequency    albumin human 5% bottle 500 mL PRN    albuterol nebulizer solution 2.5 mg Q4H PRN    bisacodyl suppository 10 mg Daily PRN    dextrose 50% injection 12.5 g PRN    DOBUtamine 1000 mg in D5W 250 mL infusion (premix non-titrating) Continuous    DOPamine 400 mg in dextrose 5 % 250 mL infusion (premix) (NON-TITRATING) Continuous    EPINEPHrine (ADRENALIN) 4 mg  in sodium chloride 0.9% 250 mL infusion Continuous    ertapenem (INVANZ) 1 g in sodium chloride 0.9 % 100 mL IVPB (ready to mix system) Q24H    glucagon (human recombinant) injection 1 mg PRN    heparin 25,000 units in dextrose 5% 250 mL (100 units/mL) infusion (heparin infusion) Continuous    insulin aspart pen 0-5 Units Q4H PRN    ondansetron injection 4 mg Q6H PRN    pantoprazole 40 mg in dextrose 5 % 100 mL infusion (ready to mix system) Continuous    sodium chloride 0.9% flush 10 mL Q6H    And    sodium chloride 0.9% flush 10 mL PRN    sodium chloride 0.9% flush 10 mL Q6H    And    sodium chloride 0.9% flush 10 mL PRN    TPN ADULT CENTRAL LINE CUSTOM Continuous       Objective:     Vital Signs (Most Recent):  Temp: 99 °F (37.2 °C) (08/18/17 2300)  Pulse: 103 (08/18/17 2330)  Resp: 18 (08/18/17 2330)  BP: (!) 89/0 (08/18/17 2300)  SpO2: 98 % (08/18/17 2300)  O2 Device (Oxygen Therapy): nasal cannula (08/18/17 2300) Vital Signs (24h Range):  Temp:  [97.8 °F (36.6 °C)-99.1 °F (37.3 °C)] 99 °F (37.2 °C)  Pulse:  [] 103  Resp:  [14-28] 18  SpO2:  [90 %-98 %] 98 %  BP: (88-90)/(0) 89/0  Arterial Line BP: ()/() 94/68     Weight: 95.6 kg (210 lb 12.2 oz) (08/18/17 1400)  Body mass index is 32.05 kg/m².  Body surface area is 2.14 meters squared.    I/O last 3 completed shifts:  In: 3081.4 [I.V.:1508.2; Blood:250]  Out: 2495 [Urine:1695; Drains:800]    Physical Exam   Constitutional: He appears well-developed and well-nourished.   Cardiovascular: Exam reveals no gallop and no friction rub.    No murmur heard.  Mechanical device sounds   Pulmonary/Chest: Effort normal and breath sounds normal. No respiratory distress. He has no wheezes. He has no rales.   Abdominal: Soft. There is no tenderness.   Musculoskeletal: He exhibits edema.   Neurological: He is alert.   Skin: Skin is warm and dry. No rash noted.   Vitals reviewed.      Significant Labs:   Reviewed    Significant  Imaging:  Reviewed    Assessment/Plan:     Acute renal failure with acute tubular necrosis superimposed on stage 3 chronic kidney disease    Patient with nonoliguric INDIRA, likely ischemic (v. septic ATN) from renal hypoperfusion given cardiomyopathy.  Patient creatinine has remained stable, has maintained relative balance in I/O's, do not suspect elevated BUN indicative of uremia (elevation likely secondary to TPN administration and possible blood loss, agree with PRBC).  Discussed with primary team (Dr. Downing), requested renal replacement therapy for clearance only.  Patient seen and examined on SLED 8.16, 6hours, no net UF to prevent any hemodynamic instability that may lead to further renal injury.  BUN/Cr slightly elevated from yesterday, however, would recommend furosemide 80mg IV bid given initial response to one time dose, will continue to monitor labs, I/O's closely.  Patient/wife voiced understanding of above.              David Sal MD  Nephrology  [facility]

## 2017-08-19 NOTE — PROGRESS NOTES
[facility]  Nephrology  Progress Note    Patient Name: Suman Hayden  MRN: [id number]  Admission Date: 7/18/2017  Hospital Length of Stay: 32 days  Attending Provider: Sunny Downing MD   Primary Care Physician: Joe Ernst MD  Principal Problem:Acute on chronic combined systolic and diastolic heart failure    Subjective:     HPI: Mr. Will is a 68 yo AAM with PMHx of NICM, HTN, HLD, and CKD III who presented to the hospital on 7/18 after syncopal episode at home.  He underwent LVAD placement on 7/27.  Labs that day showed a spike in his SCr to 1.5, but he quickly returned to baseline (1.2-1.3) and stayed stable until 08/01 when he increased to 1.5 and has steadily remained above baseline since, up to 2.3 on consult.  He has remained on lasix infusion with intermittent dosages of diuril to aid in diuresis.  On 08/3, there was a reported decrease in LVAD flows which resolved with decrease in lasix infusion and administration of 1 unit of PRBCs, likely causing decrease renal perfusion leading to an ischemic event.  He continues with adequate UOP on lasix gtt. He was transferred to the floor from ICU on 08/08 and was noted to become hypotensive with a doppler pressure of 58, since been requiring pressors for BP support.  Nephrology was consulted for INDIRA and decreased UOP.    Interval History: Patient responsive to questioning (especially to his wife's questions), denies any respiratory distress this am.     Review of patient's allergies indicates:  No Known Allergies  Current Facility-Administered Medications   Medication Frequency    albumin human 5% bottle 500 mL PRN    albuterol nebulizer solution 2.5 mg Q4H PRN    bisacodyl suppository 10 mg Daily PRN    dextrose 50% injection 12.5 g PRN    DOBUtamine 1000 mg in D5W 250 mL infusion (premix non-titrating) Continuous    DOPamine 400 mg in dextrose 5 % 250 mL infusion (premix) (NON-TITRATING) Continuous    EPINEPHrine (ADRENALIN) 4 mg in  sodium chloride 0.9% 250 mL infusion Continuous    ertapenem (INVANZ) 1 g in sodium chloride 0.9 % 100 mL IVPB (ready to mix system) Q24H    glucagon (human recombinant) injection 1 mg PRN    heparin 25,000 units in dextrose 5% 250 mL (100 units/mL) infusion (heparin infusion) Continuous    insulin aspart pen 0-5 Units Q4H PRN    ondansetron injection 4 mg Q6H PRN    pantoprazole 40 mg in dextrose 5 % 100 mL infusion (ready to mix system) Continuous    sodium chloride 0.9% flush 10 mL Q6H    And    sodium chloride 0.9% flush 10 mL PRN    sodium chloride 0.9% flush 10 mL Q6H    And    sodium chloride 0.9% flush 10 mL PRN    TPN ADULT CENTRAL LINE CUSTOM Continuous    TPN ADULT CENTRAL LINE CUSTOM Continuous       Objective:     Vital Signs (Most Recent):  Temp: 98.6 °F (37 °C) (08/19/17 1500)  Pulse: 102 (08/19/17 1715)  Resp: 18 (08/19/17 1715)  BP: (!) 88/0 (08/19/17 1500)  SpO2: 95 % (08/19/17 0300)  O2 Device (Oxygen Therapy): nasal cannula (08/19/17 1500) Vital Signs (24h Range):  Temp:  [97.9 °F (36.6 °C)-99.1 °F (37.3 °C)] 98.6 °F (37 °C)  Pulse:  [] 102  Resp:  [14-30] 18  SpO2:  [95 %-98 %] 95 %  BP: (72-90)/(0) 88/0  Arterial Line BP: ()/(59-84) 99/75     Weight: 95.6 kg (210 lb 12.2 oz) (08/18/17 1400)  Body mass index is 32.05 kg/m².  Body surface area is 2.14 meters squared.    I/O last 3 completed shifts:  In: 3292.3 [I.V.:1667.1; Blood:250; NG/GT:65]  Out: 3515 [Urine:2815; Drains:700]    Physical Exam   Constitutional: He appears well-developed and well-nourished.   Cardiovascular: Exam reveals no gallop and no friction rub.    No murmur heard.  Mechanical device sounds   Pulmonary/Chest: Effort normal and breath sounds normal. No respiratory distress. He has no wheezes. He has no rales.   Abdominal: Soft. There is no tenderness.   Musculoskeletal: He exhibits edema.   Neurological: He is alert.   Skin: Skin is warm and dry. No rash noted.   Vitals reviewed.      Significant  Labs:   Reviewed    Significant Imaging:  Reviewed    Assessment/Plan:     Acute renal failure with acute tubular necrosis superimposed on stage 3 chronic kidney disease    Patient with nonoliguric INDIRA, likely ischemic (v. septic ATN) from renal hypoperfusion given cardiomyopathy.  Patient creatinine has remained stable, has maintained relative balance in I/O's, do not suspect elevated BUN indicative of uremia (elevation likely secondary to TPN administration and possible blood loss, agree with PRBC).  Discussed with primary team (Dr. Downing), requested renal replacement therapy for clearance only.  Patient seen and examined on SLED 8.16, 6hours, no net UF to prevent any hemodynamic instability that may have lead to further renal injury.  BUN/Cr slightly elevated from yesterday, however, would recommend furosemide 80mg IV bid, will continue to monitor labs, I/O's closely.  Patient/wife voiced understanding of above.              David Sal MD  Nephrology  [facility]

## 2017-08-19 NOTE — PROGRESS NOTES
[facility]  Heart Transplant  Progress Note    Patient Name: Suman Hayden  MRN: [id number]  Admission Date: 7/18/2017  Hospital Length of Stay: 32 days  Attending Physician: Sunny Downing MD  Primary Care Provider: Joe Ernst MD  Principal Problem:Acute on chronic combined systolic and diastolic heart failure    Subjective:     Interval History: patient more drowsy per the wife, in bed still, worked with PT yesterday     Continuous Infusions:   DOBUTamine 2.5 mcg/kg/min (08/19/17 1000)    DOPamine 3 mcg/kg/min (08/19/17 1000)    epinephrine infusion Stopped (08/15/17 1500)    heparin (porcine) in 5 % dex 100 Units/hr (08/19/17 1000)    pantoprazole 40 mg in dextrose 5 % 100 mL infusion (ready to mix system) 8 mg/hr (08/19/17 1000)    TPN ADULT CENTRAL LINE CUSTOM 50 mL/hr at 08/19/17 1000    TPN ADULT CENTRAL LINE CUSTOM       Scheduled Meds:   ertapenem (INVANZ) IVPB  1 g Intravenous Q24H    sodium chloride 0.9%  10 mL Intravenous Q6H    sodium chloride 0.9%  10 mL Intravenous Q6H     PRN Meds:albumin human 5%, albuterol sulfate, bisacodyl, dextrose 50%, glucagon (human recombinant), insulin aspart, ondansetron, Flushing PICC Protocol **AND** sodium chloride 0.9% **AND** sodium chloride 0.9%, Flushing PICC Protocol **AND** sodium chloride 0.9% **AND** sodium chloride 0.9%    Review of patient's allergies indicates:  No Known Allergies  Objective:     Vital Signs (Most Recent):  Temp: 97.9 °F (36.6 °C) (08/19/17 0730)  Pulse: 96 (08/19/17 1000)  Resp: (!) 21 (08/19/17 1000)  BP: (!) 72/0 (08/19/17 0730)  SpO2: 95 % (08/19/17 0300) Vital Signs (24h Range):  Temp:  [97.8 °F (36.6 °C)-99.1 °F (37.3 °C)] 97.9 °F (36.6 °C)  Pulse:  [] 96  Resp:  [14-28] 21  SpO2:  [95 %-98 %] 95 %  BP: (72-90)/(0) 72/0  Arterial Line BP: ()/(59-84) 92/67     Weight: 95.6 kg (210 lb 12.2 oz)  Body mass index is 32.05 kg/m².      Intake/Output Summary (Last 24 hours) at 08/19/17 1035  Last data  filed at 08/19/17 1000   Gross per 24 hour   Intake          2126.29 ml   Output             2795 ml   Net          -668.71 ml       Hemodynamic Parameters:           Physical Exam   Constitutional: He appears well-developed and well-nourished.   HENT:   Head: Normocephalic and atraumatic.   Eyes: EOM are normal. Pupils are equal, round, and reactive to light.   Cardiovascular: Normal rate, regular rhythm and normal heart sounds.  Exam reveals no gallop and no friction rub.    No murmur heard.  + LVAD hum    Pulmonary/Chest: Effort normal. No respiratory distress. He has no wheezes. He has no rales.   Abdominal: Soft. Bowel sounds are normal.   Musculoskeletal:   LUE Edema    Neurological: He is alert.   Nursing note and vitals reviewed.      Significant Labs:  CBC:    Recent Labs  Lab 08/19/17  0355   WBC 17.88*  17.88*   RBC 2.81*  2.81*   HGB 7.8*  7.8*   HCT 23.1*  23.1*     202   MCV 82  82   MCH 27.8  27.8   MCHC 33.8  33.8     BNP:    Recent Labs  Lab 08/18/17  0230   BNP 2,538*     CMP:    Recent Labs  Lab 08/19/17  0355   *  123*   CALCIUM 8.2*  8.2*   ALBUMIN 1.8*   PROT 5.4*     142   K 4.0  4.0   CO2 22*  22*     106   *  116*   CREATININE 2.7*  2.7*   ALKPHOS 262*   ALT 96*   AST 89*   BILITOT 2.0*      Coagulation:     Recent Labs  Lab 08/19/17  0355   INR 1.3*   APTT 28.1     LDH:    Recent Labs  Lab 08/17/17  0351 08/18/17  0230 08/19/17  0355   * 264* 308*     Microbiology:  Microbiology Results (last 7 days)     Procedure Component Value Units Date/Time    IV catheter culture [[id number]] Collected:  08/16/17 1730    Order Status:  Completed Specimen:  Catheter Tip from Catheter Tip, PICC Updated:  08/18/17 1055     Aerobic Culture - Cath tip No growth    Blood culture [[id number]] Collected:  08/11/17 1326    Order Status:  Completed Specimen:  Blood from Peripheral, Forearm, Left Updated:  08/16/17 1812     Blood Culture, Routine No growth  after 5 days.    Blood culture [[id number]] Collected:  08/09/17 0733    Order Status:  Completed Specimen:  Blood from Peripheral, Antecubital, Left Updated:  08/14/17 1022     Blood Culture, Routine No growth after 5 days.    Blood culture [[id number]]  (Susceptibility) Collected:  08/09/17 0813    Order Status:  Completed Specimen:  Blood from Line, Arterial, Right Updated:  08/12/17 1113     Blood Culture, Routine Gram stain clement bottle: Gram negative rods      Blood Culture, Routine Results called to and read back by: Emma York RN 08/10/2017  11:13     Blood Culture, Routine ESCHERICHIA COLI ESBL          I have reviewed all pertinent labs within the past 24 hours.    Estimated Creatinine Clearance: 29.8 mL/min (based on Cr of 2.7).    Diagnostic Results:  I have reviewed and interpreted all pertinent imaging results/findings within the past 24 hours.    Assessment and Plan:     Upper GI bleed    -GI following. EGD done will follow up GI recs and possible vascular consult        Bacteremia    -ESBL from blood culture 8/9   -Ertapenem ( MERRITT < 0.5). ID following        Atrial fibrillation    -AC, Amio per C TS          LVAD (left ventricular assist device) present    - LVAD HM III. Placed this admit 7/27/17  - CTS Primary  - chest closure (7/28/17) and extubated (7/29/17)  -Reintubated 8/9/17 and extubated 8/13/17  -Now on Dopamine,   - Speed 5200  - LDH stable  - INR subtherpeutic. AC per CTS                    Atrial tachycardia    - [id number] - 3  -Rec restarting Amio. AC per CTS        AICD discharge    - appropriate in the setting of VT aggravated by underlying AT/AFL (earlier during the admission)  - 7/28 - setting of AF undersense - device reprogrammed to VVI 80  - tachy therapy turned off  - Rec restarting Amio  - EP planning to do lead revision         Hepatitis B core antibody positive since 2012    - will defer liver biopsy as CTS not requiring it  - ID consult cleared the patient for sx  - case  discussed with hepatology, low suspicion for liver involvement        V-tach    - Rec restarting Amio        Hyperlipidemia    - Rec resuming pravastatin once liver enzymes stabilize             MELISSA Long  Heart Transplant  [facility]

## 2017-08-19 NOTE — SUBJECTIVE & OBJECTIVE
Interval History: patient more drowsy per the wife, in bed still, worked with PT yesterday     Continuous Infusions:   DOBUTamine 2.5 mcg/kg/min (08/19/17 1000)    DOPamine 3 mcg/kg/min (08/19/17 1000)    epinephrine infusion Stopped (08/15/17 1500)    heparin (porcine) in 5 % dex 100 Units/hr (08/19/17 1000)    pantoprazole 40 mg in dextrose 5 % 100 mL infusion (ready to mix system) 8 mg/hr (08/19/17 1000)    TPN ADULT CENTRAL LINE CUSTOM 50 mL/hr at 08/19/17 1000    TPN ADULT CENTRAL LINE CUSTOM       Scheduled Meds:   ertapenem (INVANZ) IVPB  1 g Intravenous Q24H    sodium chloride 0.9%  10 mL Intravenous Q6H    sodium chloride 0.9%  10 mL Intravenous Q6H     PRN Meds:albumin human 5%, albuterol sulfate, bisacodyl, dextrose 50%, glucagon (human recombinant), insulin aspart, ondansetron, Flushing PICC Protocol **AND** sodium chloride 0.9% **AND** sodium chloride 0.9%, Flushing PICC Protocol **AND** sodium chloride 0.9% **AND** sodium chloride 0.9%    Review of patient's allergies indicates:  No Known Allergies  Objective:     Vital Signs (Most Recent):  Temp: 97.9 °F (36.6 °C) (08/19/17 0730)  Pulse: 96 (08/19/17 1000)  Resp: (!) 21 (08/19/17 1000)  BP: (!) 72/0 (08/19/17 0730)  SpO2: 95 % (08/19/17 0300) Vital Signs (24h Range):  Temp:  [97.8 °F (36.6 °C)-99.1 °F (37.3 °C)] 97.9 °F (36.6 °C)  Pulse:  [] 96  Resp:  [14-28] 21  SpO2:  [95 %-98 %] 95 %  BP: (72-90)/(0) 72/0  Arterial Line BP: ()/(59-84) 92/67     Weight: 95.6 kg (210 lb 12.2 oz)  Body mass index is 32.05 kg/m².      Intake/Output Summary (Last 24 hours) at 08/19/17 1035  Last data filed at 08/19/17 1000   Gross per 24 hour   Intake          2126.29 ml   Output             2795 ml   Net          -668.71 ml       Hemodynamic Parameters:           Physical Exam   Constitutional: He appears well-developed and well-nourished.   HENT:   Head: Normocephalic and atraumatic.   Eyes: EOM are normal. Pupils are equal, round, and reactive  to light.   Cardiovascular: Normal rate, regular rhythm and normal heart sounds.  Exam reveals no gallop and no friction rub.    No murmur heard.  + LVAD hum    Pulmonary/Chest: Effort normal. No respiratory distress. He has no wheezes. He has no rales.   Abdominal: Soft. Bowel sounds are normal.   Musculoskeletal:   LUE Edema    Neurological: He is alert.   Nursing note and vitals reviewed.      Significant Labs:  CBC:    Recent Labs  Lab 08/19/17  0355   WBC 17.88*  17.88*   RBC 2.81*  2.81*   HGB 7.8*  7.8*   HCT 23.1*  23.1*     202   MCV 82  82   MCH 27.8  27.8   MCHC 33.8  33.8     BNP:    Recent Labs  Lab 08/18/17  0230   BNP 2,538*     CMP:    Recent Labs  Lab 08/19/17  0355   *  123*   CALCIUM 8.2*  8.2*   ALBUMIN 1.8*   PROT 5.4*     142   K 4.0  4.0   CO2 22*  22*     106   *  116*   CREATININE 2.7*  2.7*   ALKPHOS 262*   ALT 96*   AST 89*   BILITOT 2.0*      Coagulation:     Recent Labs  Lab 08/19/17  0355   INR 1.3*   APTT 28.1     LDH:    Recent Labs  Lab 08/17/17  0351 08/18/17  0230 08/19/17  0355   * 264* 308*     Microbiology:  Microbiology Results (last 7 days)     Procedure Component Value Units Date/Time    IV catheter culture [[id number]] Collected:  08/16/17 1730    Order Status:  Completed Specimen:  Catheter Tip from Catheter Tip, PICC Updated:  08/18/17 1055     Aerobic Culture - Cath tip No growth    Blood culture [[id number]] Collected:  08/11/17 1326    Order Status:  Completed Specimen:  Blood from Peripheral, Forearm, Left Updated:  08/16/17 1812     Blood Culture, Routine No growth after 5 days.    Blood culture [[id number]] Collected:  08/09/17 0733    Order Status:  Completed Specimen:  Blood from Peripheral, Antecubital, Left Updated:  08/14/17 1022     Blood Culture, Routine No growth after 5 days.    Blood culture [[id number]]  (Susceptibility) Collected:  08/09/17 0813    Order Status:  Completed Specimen:  Blood from Line,  Arterial, Right Updated:  08/12/17 1113     Blood Culture, Routine Gram stain clement bottle: Gram negative rods      Blood Culture, Routine Results called to and read back by: Emma York RN 08/10/2017  11:13     Blood Culture, Routine ESCHERICHIA COLI ESBL          I have reviewed all pertinent labs within the past 24 hours.    Estimated Creatinine Clearance: 29.8 mL/min (based on Cr of 2.7).    Diagnostic Results:  I have reviewed and interpreted all pertinent imaging results/findings within the past 24 hours.

## 2017-08-19 NOTE — SUBJECTIVE & OBJECTIVE
Interval History: Patient responsive to questioning (especially to his wife's questions), denies any respiratory distress this am.     Review of patient's allergies indicates:  No Known Allergies  Current Facility-Administered Medications   Medication Frequency    albumin human 5% bottle 500 mL PRN    albuterol nebulizer solution 2.5 mg Q4H PRN    bisacodyl suppository 10 mg Daily PRN    dextrose 50% injection 12.5 g PRN    DOBUtamine 1000 mg in D5W 250 mL infusion (premix non-titrating) Continuous    DOPamine 400 mg in dextrose 5 % 250 mL infusion (premix) (NON-TITRATING) Continuous    EPINEPHrine (ADRENALIN) 4 mg in sodium chloride 0.9% 250 mL infusion Continuous    ertapenem (INVANZ) 1 g in sodium chloride 0.9 % 100 mL IVPB (ready to mix system) Q24H    glucagon (human recombinant) injection 1 mg PRN    heparin 25,000 units in dextrose 5% 250 mL (100 units/mL) infusion (heparin infusion) Continuous    insulin aspart pen 0-5 Units Q4H PRN    ondansetron injection 4 mg Q6H PRN    pantoprazole 40 mg in dextrose 5 % 100 mL infusion (ready to mix system) Continuous    sodium chloride 0.9% flush 10 mL Q6H    And    sodium chloride 0.9% flush 10 mL PRN    sodium chloride 0.9% flush 10 mL Q6H    And    sodium chloride 0.9% flush 10 mL PRN    TPN ADULT CENTRAL LINE CUSTOM Continuous    TPN ADULT CENTRAL LINE CUSTOM Continuous       Objective:     Vital Signs (Most Recent):  Temp: 98.6 °F (37 °C) (08/19/17 1500)  Pulse: 102 (08/19/17 1715)  Resp: 18 (08/19/17 1715)  BP: (!) 88/0 (08/19/17 1500)  SpO2: 95 % (08/19/17 0300)  O2 Device (Oxygen Therapy): nasal cannula (08/19/17 1500) Vital Signs (24h Range):  Temp:  [97.9 °F (36.6 °C)-99.1 °F (37.3 °C)] 98.6 °F (37 °C)  Pulse:  [] 102  Resp:  [14-30] 18  SpO2:  [95 %-98 %] 95 %  BP: (72-90)/(0) 88/0  Arterial Line BP: ()/(59-84) 99/75     Weight: 95.6 kg (210 lb 12.2 oz) (08/18/17 1400)  Body mass index is 32.05 kg/m².  Body surface area is 2.14  meters squared.    I/O last 3 completed shifts:  In: 3292.3 [I.V.:1667.1; Blood:250; NG/GT:65]  Out: 3515 [Urine:2815; Drains:700]    Physical Exam   Constitutional: He appears well-developed and well-nourished.   Cardiovascular: Exam reveals no gallop and no friction rub.    No murmur heard.  Mechanical device sounds   Pulmonary/Chest: Effort normal and breath sounds normal. No respiratory distress. He has no wheezes. He has no rales.   Abdominal: Soft. There is no tenderness.   Musculoskeletal: He exhibits edema.   Neurological: He is alert.   Skin: Skin is warm and dry. No rash noted.   Vitals reviewed.      Significant Labs:   Reviewed    Significant Imaging:  Reviewed

## 2017-08-19 NOTE — NURSING
Pt OOBTC per PT and RN assist. Pt tolerating well. All VSS, LVAD numbers consistent. Will continue to monitor

## 2017-08-20 LAB
ALBUMIN SERPL BCP-MCNC: 1.8 G/DL
ALBUMIN SERPL BCP-MCNC: 1.9 G/DL
ALP SERPL-CCNC: 258 U/L
ALP SERPL-CCNC: 259 U/L
ALP SERPL-CCNC: 260 U/L
ALP SERPL-CCNC: 260 U/L
ALP SERPL-CCNC: 264 U/L
ALT SERPL W/O P-5'-P-CCNC: 64 U/L
ALT SERPL W/O P-5'-P-CCNC: 66 U/L
ALT SERPL W/O P-5'-P-CCNC: 70 U/L
ALT SERPL W/O P-5'-P-CCNC: 74 U/L
ALT SERPL W/O P-5'-P-CCNC: 74 U/L
ANION GAP SERPL CALC-SCNC: 15 MMOL/L
ANISOCYTOSIS BLD QL SMEAR: SLIGHT
APTT BLDCRRT: 29.1 SEC
APTT BLDCRRT: 29.2 SEC
APTT BLDCRRT: 31.2 SEC
AST SERPL-CCNC: 61 U/L
AST SERPL-CCNC: 70 U/L
AST SERPL-CCNC: 76 U/L
AST SERPL-CCNC: 76 U/L
AST SERPL-CCNC: 85 U/L
BASOPHILS # BLD AUTO: 0.02 K/UL
BASOPHILS # BLD AUTO: 0.02 K/UL
BASOPHILS # BLD AUTO: 0.03 K/UL
BASOPHILS NFR BLD: 0.1 %
BASOPHILS NFR BLD: 0.1 %
BASOPHILS NFR BLD: 0.2 %
BILIRUB SERPL-MCNC: 1.7 MG/DL
BILIRUB SERPL-MCNC: 1.8 MG/DL
BUN SERPL-MCNC: 127 MG/DL
BUN SERPL-MCNC: 128 MG/DL
BUN SERPL-MCNC: 133 MG/DL
CALCIUM SERPL-MCNC: 8 MG/DL
CALCIUM SERPL-MCNC: 8.2 MG/DL
CALCIUM SERPL-MCNC: 8.2 MG/DL
CALCIUM SERPL-MCNC: 8.3 MG/DL
CHLORIDE SERPL-SCNC: 105 MMOL/L
CHLORIDE SERPL-SCNC: 106 MMOL/L
CHLORIDE SERPL-SCNC: 107 MMOL/L
CHLORIDE SERPL-SCNC: 107 MMOL/L
CO2 SERPL-SCNC: 21 MMOL/L
CO2 SERPL-SCNC: 23 MMOL/L
CREAT SERPL-MCNC: 3 MG/DL
CREAT SERPL-MCNC: 3.1 MG/DL
CREAT SERPL-MCNC: 3.1 MG/DL
DIFFERENTIAL METHOD: ABNORMAL
EOSINOPHIL # BLD AUTO: 0.3 K/UL
EOSINOPHIL NFR BLD: 1.7 %
EOSINOPHIL NFR BLD: 1.8 %
EOSINOPHIL NFR BLD: 2 %
ERYTHROCYTE [DISTWIDTH] IN BLOOD BY AUTOMATED COUNT: 17.4 %
ERYTHROCYTE [DISTWIDTH] IN BLOOD BY AUTOMATED COUNT: 17.5 %
ERYTHROCYTE [DISTWIDTH] IN BLOOD BY AUTOMATED COUNT: 17.6 %
EST. GFR  (AFRICAN AMERICAN): 22.8 ML/MIN/1.73 M^2
EST. GFR  (AFRICAN AMERICAN): 22.8 ML/MIN/1.73 M^2
EST. GFR  (AFRICAN AMERICAN): 23.7 ML/MIN/1.73 M^2
EST. GFR  (NON AFRICAN AMERICAN): 19.7 ML/MIN/1.73 M^2
EST. GFR  (NON AFRICAN AMERICAN): 19.7 ML/MIN/1.73 M^2
EST. GFR  (NON AFRICAN AMERICAN): 20.5 ML/MIN/1.73 M^2
GIANT PLATELETS BLD QL SMEAR: PRESENT
GLUCOSE SERPL-MCNC: 116 MG/DL
GLUCOSE SERPL-MCNC: 123 MG/DL
GLUCOSE SERPL-MCNC: 124 MG/DL
GLUCOSE SERPL-MCNC: 125 MG/DL
HCT VFR BLD AUTO: 21.6 %
HCT VFR BLD AUTO: 21.6 %
HCT VFR BLD AUTO: 21.8 %
HCT VFR BLD AUTO: 22 %
HGB BLD-MCNC: 7.2 G/DL
HGB BLD-MCNC: 7.3 G/DL
HGB BLD-MCNC: 7.3 G/DL
HGB BLD-MCNC: 7.6 G/DL
HYPOCHROMIA BLD QL SMEAR: ABNORMAL
INR PPP: 1.2
LDH SERPL L TO P-CCNC: 373 U/L
LYMPHOCYTES # BLD AUTO: 1.1 K/UL
LYMPHOCYTES # BLD AUTO: 1.2 K/UL
LYMPHOCYTES # BLD AUTO: 1.3 K/UL
LYMPHOCYTES # BLD AUTO: 1.3 K/UL
LYMPHOCYTES NFR BLD: 7 %
LYMPHOCYTES NFR BLD: 7.6 %
LYMPHOCYTES NFR BLD: 8 %
MAGNESIUM SERPL-MCNC: 2.1 MG/DL
MCH RBC QN AUTO: 27.9 PG
MCH RBC QN AUTO: 28.2 PG
MCH RBC QN AUTO: 28.2 PG
MCH RBC QN AUTO: 29.3 PG
MCHC RBC AUTO-ENTMCNC: 33.3 G/DL
MCHC RBC AUTO-ENTMCNC: 33.5 G/DL
MCHC RBC AUTO-ENTMCNC: 33.8 G/DL
MCHC RBC AUTO-ENTMCNC: 34.5 G/DL
MCV RBC AUTO: 83 FL
MCV RBC AUTO: 84 FL
MCV RBC AUTO: 84 FL
MCV RBC AUTO: 85 FL
MONOCYTES # BLD AUTO: 0.7 K/UL
MONOCYTES # BLD AUTO: 0.8 K/UL
MONOCYTES # BLD AUTO: 0.8 K/UL
MONOCYTES # BLD AUTO: 0.9 K/UL
MONOCYTES NFR BLD: 4 %
MONOCYTES NFR BLD: 5.1 %
MONOCYTES NFR BLD: 5.1 %
MONOCYTES NFR BLD: 5.6 %
NEUTROPHILS # BLD AUTO: 13.1 K/UL
NEUTROPHILS # BLD AUTO: 13.2 K/UL
NEUTROPHILS # BLD AUTO: 14.3 K/UL
NEUTROPHILS # BLD AUTO: 14.5 K/UL
NEUTROPHILS NFR BLD: 84.9 %
NEUTROPHILS NFR BLD: 85.4 %
NEUTROPHILS NFR BLD: 85.8 %
NEUTROPHILS NFR BLD: 86.6 %
PHOSPHATE SERPL-MCNC: 5.9 MG/DL
PLATELET # BLD AUTO: 235 K/UL
PLATELET # BLD AUTO: 247 K/UL
PLATELET # BLD AUTO: 256 K/UL
PLATELET # BLD AUTO: 269 K/UL
PLATELET BLD QL SMEAR: ABNORMAL
PMV BLD AUTO: 12 FL
PMV BLD AUTO: 12.1 FL
PMV BLD AUTO: 13.1 FL
PMV BLD AUTO: ABNORMAL FL
POCT GLUCOSE: 121 MG/DL (ref 70–110)
POCT GLUCOSE: 127 MG/DL (ref 70–110)
POCT GLUCOSE: 128 MG/DL (ref 70–110)
POCT GLUCOSE: 129 MG/DL (ref 70–110)
POCT GLUCOSE: 133 MG/DL (ref 70–110)
POLYCHROMASIA BLD QL SMEAR: ABNORMAL
POTASSIUM SERPL-SCNC: 3.8 MMOL/L
POTASSIUM SERPL-SCNC: 3.9 MMOL/L
POTASSIUM SERPL-SCNC: 4.2 MMOL/L
POTASSIUM SERPL-SCNC: 4.3 MMOL/L
PROT SERPL-MCNC: 5.6 G/DL
PROT SERPL-MCNC: 5.7 G/DL
PROT SERPL-MCNC: 5.7 G/DL
PROT SERPL-MCNC: 5.9 G/DL
PROT SERPL-MCNC: 6 G/DL
PROTHROMBIN TIME: 12.9 SEC
RBC # BLD AUTO: 2.58 M/UL
RBC # BLD AUTO: 2.59 M/UL
SODIUM SERPL-SCNC: 142 MMOL/L
SODIUM SERPL-SCNC: 143 MMOL/L
WBC # BLD AUTO: 15.48 K/UL
WBC # BLD AUTO: 15.57 K/UL
WBC # BLD AUTO: 16.78 K/UL
WBC # BLD AUTO: 16.88 K/UL

## 2017-08-20 PROCEDURE — 83615 LACTATE (LD) (LDH) ENZYME: CPT

## 2017-08-20 PROCEDURE — 25000003 PHARM REV CODE 250: Performed by: STUDENT IN AN ORGANIZED HEALTH CARE EDUCATION/TRAINING PROGRAM

## 2017-08-20 PROCEDURE — C9113 INJ PANTOPRAZOLE SODIUM, VIA: HCPCS | Performed by: STUDENT IN AN ORGANIZED HEALTH CARE EDUCATION/TRAINING PROGRAM

## 2017-08-20 PROCEDURE — 99233 SBSQ HOSP IP/OBS HIGH 50: CPT | Mod: ,,, | Performed by: INTERNAL MEDICINE

## 2017-08-20 PROCEDURE — 83735 ASSAY OF MAGNESIUM: CPT

## 2017-08-20 PROCEDURE — 63600175 PHARM REV CODE 636 W HCPCS: Performed by: STUDENT IN AN ORGANIZED HEALTH CARE EDUCATION/TRAINING PROGRAM

## 2017-08-20 PROCEDURE — 20000000 HC ICU ROOM

## 2017-08-20 PROCEDURE — 97530 THERAPEUTIC ACTIVITIES: CPT

## 2017-08-20 PROCEDURE — 27000248 HC VAD-ADDITIONAL DAY

## 2017-08-20 PROCEDURE — A4216 STERILE WATER/SALINE, 10 ML: HCPCS | Performed by: THORACIC SURGERY (CARDIOTHORACIC VASCULAR SURGERY)

## 2017-08-20 PROCEDURE — 80053 COMPREHEN METABOLIC PANEL: CPT | Mod: 91

## 2017-08-20 PROCEDURE — 85730 THROMBOPLASTIN TIME PARTIAL: CPT

## 2017-08-20 PROCEDURE — 25000003 PHARM REV CODE 250: Performed by: THORACIC SURGERY (CARDIOTHORACIC VASCULAR SURGERY)

## 2017-08-20 PROCEDURE — 99233 SBSQ HOSP IP/OBS HIGH 50: CPT | Mod: ,,, | Performed by: SURGERY

## 2017-08-20 PROCEDURE — 85730 THROMBOPLASTIN TIME PARTIAL: CPT | Mod: 91

## 2017-08-20 PROCEDURE — 85025 COMPLETE CBC W/AUTO DIFF WBC: CPT | Mod: 91

## 2017-08-20 PROCEDURE — 85610 PROTHROMBIN TIME: CPT

## 2017-08-20 PROCEDURE — 80053 COMPREHEN METABOLIC PANEL: CPT

## 2017-08-20 PROCEDURE — 84100 ASSAY OF PHOSPHORUS: CPT

## 2017-08-20 RX ORDER — FUROSEMIDE 10 MG/ML
80 INJECTION INTRAMUSCULAR; INTRAVENOUS 2 TIMES DAILY
Status: DISCONTINUED | OUTPATIENT
Start: 2017-08-20 | End: 2017-08-21

## 2017-08-20 RX ADMIN — ERTAPENEM SODIUM 1 G: 1 INJECTION, POWDER, LYOPHILIZED, FOR SOLUTION INTRAMUSCULAR; INTRAVENOUS at 08:08

## 2017-08-20 RX ADMIN — Medication 10 ML: at 01:08

## 2017-08-20 RX ADMIN — PANTOPRAZOLE SODIUM 8 MG/HR: 40 INJECTION, POWDER, FOR SOLUTION INTRAVENOUS at 10:08

## 2017-08-20 RX ADMIN — DOBUTAMINE IN DEXTROSE 2.5 MCG/KG/MIN: 400 INJECTION, SOLUTION INTRAVENOUS at 12:08

## 2017-08-20 RX ADMIN — FUROSEMIDE 80 MG: 10 INJECTION, SOLUTION INTRAVENOUS at 10:08

## 2017-08-20 RX ADMIN — PANTOPRAZOLE SODIUM 8 MG/HR: 40 INJECTION, POWDER, FOR SOLUTION INTRAVENOUS at 03:08

## 2017-08-20 RX ADMIN — CALCIUM GLUCONATE: 94 INJECTION, SOLUTION INTRAVENOUS at 09:08

## 2017-08-20 RX ADMIN — PANTOPRAZOLE SODIUM 8 MG/HR: 40 INJECTION, POWDER, FOR SOLUTION INTRAVENOUS at 12:08

## 2017-08-20 RX ADMIN — DOPAMINE HYDROCHLORIDE IN DEXTROSE 3 MCG/KG/MIN: 1.6 INJECTION, SOLUTION INTRAVENOUS at 01:08

## 2017-08-20 NOTE — SUBJECTIVE & OBJECTIVE
Interval History: in chair this morning, given dose of IV lasix, CVP 12. Heparin increased yesterday per CTS    Continuous Infusions:   DOBUTamine 2.5 mcg/kg/min (08/20/17 0900)    DOPamine 3 mcg/kg/min (08/20/17 0900)    epinephrine infusion Stopped (08/15/17 1500)    heparin (porcine) in 5 % dex 200 Units/hr (08/20/17 0900)    pantoprazole 40 mg in dextrose 5 % 100 mL infusion (ready to mix system) 8 mg/hr (08/20/17 0900)    TPN ADULT CENTRAL LINE CUSTOM 50 mL/hr at 08/20/17 0900     Scheduled Meds:   ertapenem (INVANZ) IVPB  1 g Intravenous Q24H    sodium chloride 0.9%  10 mL Intravenous Q6H    sodium chloride 0.9%  10 mL Intravenous Q6H     PRN Meds:albumin human 5%, albuterol sulfate, bisacodyl, dextrose 50%, glucagon (human recombinant), insulin aspart, ondansetron, Flushing PICC Protocol **AND** sodium chloride 0.9% **AND** sodium chloride 0.9%, Flushing PICC Protocol **AND** sodium chloride 0.9% **AND** sodium chloride 0.9%    Review of patient's allergies indicates:  No Known Allergies  Objective:     Vital Signs (Most Recent):  Temp: 98 °F (36.7 °C) (08/20/17 0730)  Pulse: 110 (08/20/17 0930)  Resp: (!) 28 (08/20/17 0930)  BP: (!) 84/0 (08/20/17 0730)  SpO2: 100 % (08/20/17 0300) Vital Signs (24h Range):  Temp:  [98 °F (36.7 °C)-98.8 °F (37.1 °C)] 98 °F (36.7 °C)  Pulse:  [] 110  Resp:  [15-30] 28  SpO2:  [100 %] 100 %  BP: (81-90)/(0) 84/0  Arterial Line BP: ()/(59-75) 87/68     Weight: 95.8 kg (211 lb 3.2 oz)  Body mass index is 32.11 kg/m².      Intake/Output Summary (Last 24 hours) at 08/20/17 0950  Last data filed at 08/20/17 0900   Gross per 24 hour   Intake          2127.96 ml   Output             3246 ml   Net         -1118.04 ml       Hemodynamic Parameters:           Physical Exam   Constitutional: He appears well-developed and well-nourished.   HENT:   Head: Normocephalic and atraumatic.   Eyes: EOM are normal. Pupils are equal, round, and reactive to light.    Cardiovascular: Normal rate, regular rhythm and normal heart sounds.  Exam reveals no gallop and no friction rub.    No murmur heard.  + LVAD hum    Pulmonary/Chest: Effort normal. No respiratory distress. He has no wheezes. He has no rales.   Abdominal: Soft. Bowel sounds are normal.   Musculoskeletal:   LUE Edema    Neurological: He is alert.   Nursing note and vitals reviewed.      Significant Labs:  CBC:    Recent Labs  Lab 08/20/17  0349   WBC 16.78*  16.78*  16.78*   RBC 2.59*  2.59*  2.59*   HGB 7.6*  7.6*  7.6*   HCT 22.0*  22.0*  22.0*     235  235   MCV 85  85  85   MCH 29.3  29.3  29.3   MCHC 34.5  34.5  34.5     BNP:    Recent Labs  Lab 08/18/17  0230   BNP 2,538*     CMP:    Recent Labs  Lab 08/20/17  0349   *  125*  125*   CALCIUM 8.3*  8.3*  8.3*   ALBUMIN 1.8*  1.8*   PROT 5.7*  5.7*     142  142   K 4.2  4.2  4.2   CO2 21*  21*  21*     106  106   *  128*  128*   CREATININE 3.0*  3.0*  3.0*   ALKPHOS 260*  260*   ALT 74*  74*   AST 76*  76*   BILITOT 1.8*  1.8*      Coagulation:     Recent Labs  Lab 08/20/17  0349   INR 1.2   APTT 31.2     LDH:    Recent Labs  Lab 08/18/17  0230 08/19/17  0355 08/20/17  0349   * 308* 373*     Microbiology:  Microbiology Results (last 7 days)     Procedure Component Value Units Date/Time    IV catheter culture [[id number]] Collected:  08/16/17 1730    Order Status:  Completed Specimen:  Catheter Tip from Catheter Tip, PICC Updated:  08/18/17 1055     Aerobic Culture - Cath tip No growth    Blood culture [[id number]] Collected:  08/11/17 1326    Order Status:  Completed Specimen:  Blood from Peripheral, Forearm, Left Updated:  08/16/17 1812     Blood Culture, Routine No growth after 5 days.    Blood culture [[id number]] Collected:  08/09/17 0733    Order Status:  Completed Specimen:  Blood from Peripheral, Antecubital, Left Updated:  08/14/17 1022     Blood Culture, Routine No growth after 5  days.          I have reviewed all pertinent labs within the past 24 hours.    Estimated Creatinine Clearance: 26.8 mL/min (based on Cr of 3).    Diagnostic Results:  I have reviewed and interpreted all pertinent imaging results/findings within the past 24 hours.

## 2017-08-20 NOTE — NURSING
CTS at bedside. Updated on current VS and gtts. Notified MD that pt accidentally pulled out NG tube. No new orders to replace tube at this time. New orders given to check APTT with a goal of 40-50 for today. New orders given to administer lasix 80mg BID. Pt and spouse updated on current plan of care, will continue to monitor

## 2017-08-20 NOTE — PLAN OF CARE
Problem: Occupational Therapy Goal  Goal: Occupational Therapy Goal  Goals to be met by:  2 weeks 8/28/17    Patient will increase functional independence with ADLs by performing:  Feeding: Independent   UE Dressing with Supervision.  LE Dressing with Supervision.  Grooming while standing with Supervision.  Toileting from toilet with Supervision for hygiene and clothing management.   Stand pivot transfers with Supervision.  Toilet transfer to toilet with Supervision.  Pt will be supervision  with LVAD yasmin't.       Outcome: Ongoing (interventions implemented as appropriate)  Continue OT plan of care.

## 2017-08-20 NOTE — SUBJECTIVE & OBJECTIVE
Interval History: No acute events overnight. Patient responsive to questioning, denies any respiratory distress this am.     Review of patient's allergies indicates:  No Known Allergies  Current Facility-Administered Medications   Medication Frequency    albumin human 5% bottle 500 mL PRN    albuterol nebulizer solution 2.5 mg Q4H PRN    bisacodyl suppository 10 mg Daily PRN    dextrose 50% injection 12.5 g PRN    DOBUtamine 1000 mg in D5W 250 mL infusion (premix non-titrating) Continuous    DOPamine 400 mg in dextrose 5 % 250 mL infusion (premix) (NON-TITRATING) Continuous    EPINEPHrine (ADRENALIN) 4 mg in sodium chloride 0.9% 250 mL infusion Continuous    ertapenem (INVANZ) 1 g in sodium chloride 0.9 % 100 mL IVPB (ready to mix system) Q24H    furosemide injection 80 mg BID    glucagon (human recombinant) injection 1 mg PRN    heparin 25,000 units in dextrose 5% 250 mL (100 units/mL) infusion (heparin infusion) Continuous    insulin aspart pen 0-5 Units Q4H PRN    ondansetron injection 4 mg Q6H PRN    pantoprazole 40 mg in dextrose 5 % 100 mL infusion (ready to mix system) Continuous    sodium chloride 0.9% flush 10 mL Q6H    And    sodium chloride 0.9% flush 10 mL PRN    sodium chloride 0.9% flush 10 mL Q6H    And    sodium chloride 0.9% flush 10 mL PRN    TPN ADULT CENTRAL LINE CUSTOM Continuous    TPN ADULT CENTRAL LINE CUSTOM Continuous    warfarin tablet 3 mg Once       Objective:     Vital Signs (Most Recent):  Temp: 98.6 °F (37 °C) (08/20/17 1500)  Pulse: 92 (08/20/17 1600)  Resp: 17 (08/20/17 1600)  BP: (!) 88/0 (08/20/17 1500)  SpO2: 100 % (08/20/17 0300)  O2 Device (Oxygen Therapy): nasal cannula (08/20/17 1500) Vital Signs (24h Range):  Temp:  [97.8 °F (36.6 °C)-98.8 °F (37.1 °C)] 98.6 °F (37 °C)  Pulse:  [] 92  Resp:  [15-32] 17  SpO2:  [100 %] 100 %  BP: (84-90)/(0) 88/0  Arterial Line BP: ()/(50-75) 92/65     Weight: 95.8 kg (211 lb 3.2 oz) (08/20/17 0615)  Body mass  index is 32.11 kg/m².  Body surface area is 2.14 meters squared.    I/O last 3 completed shifts:  In: 3084 [I.V.:1163.4; NG/GT:150]  Out: 4941 [Urine:4240; Drains:700; Stool:1]    Physical Exam   Cardiovascular: Exam reveals no gallop and no friction rub.    No murmur heard.  Mechanical device sounds   Pulmonary/Chest: Effort normal and breath sounds normal. No respiratory distress. He has no wheezes. He has no rales.   Abdominal: Soft. There is no tenderness.   Musculoskeletal: He exhibits edema.   Neurological: He is alert.   Skin: Skin is warm and dry. No rash noted.   Vitals reviewed.      Significant Labs:   Reviewed    Significant Imaging:  Reviewed

## 2017-08-20 NOTE — NURSING
Spoke to Dr. Downing via telephone, updated on current VS, gtts and labs. New orders given to increase heparin gtt to 400units/hr and hold the scheduled warfarin and lasix push for this afternoon. RN instructed not to replace NG that was removed by pt accidentally this am. Plans to speak with nephrology in the am regarding increased creatinine and BUN. Pt and family updated on current plan of care. Will continue to monitor

## 2017-08-20 NOTE — ASSESSMENT & PLAN NOTE
- LVAD HM III. Placed this admit 7/27/17  - CTS Primary  - chest closure (7/28/17) and extubated (7/29/17)  -Reintubated 8/9/17 and extubated 8/13/17  -Now on Dopamine,   - Speed 5200  - LDH stable  - AC per CTS

## 2017-08-20 NOTE — PROGRESS NOTES
[facility]  Heart Transplant  Progress Note    Patient Name: Suman Hayden  MRN: [id number]  Admission Date: 7/18/2017  Hospital Length of Stay: 33 days  Attending Physician: Sunny Downing MD  Primary Care Provider: Joe Ernst MD  Principal Problem:Acute on chronic combined systolic and diastolic heart failure    Subjective:     Interval History: in chair this morning, given dose of IV lasix, CVP 12. Heparin increased yesterday per CTS    Continuous Infusions:   DOBUTamine 2.5 mcg/kg/min (08/20/17 0900)    DOPamine 3 mcg/kg/min (08/20/17 0900)    epinephrine infusion Stopped (08/15/17 1500)    heparin (porcine) in 5 % dex 200 Units/hr (08/20/17 0900)    pantoprazole 40 mg in dextrose 5 % 100 mL infusion (ready to mix system) 8 mg/hr (08/20/17 0900)    TPN ADULT CENTRAL LINE CUSTOM 50 mL/hr at 08/20/17 0900     Scheduled Meds:   ertapenem (INVANZ) IVPB  1 g Intravenous Q24H    sodium chloride 0.9%  10 mL Intravenous Q6H    sodium chloride 0.9%  10 mL Intravenous Q6H     PRN Meds:albumin human 5%, albuterol sulfate, bisacodyl, dextrose 50%, glucagon (human recombinant), insulin aspart, ondansetron, Flushing PICC Protocol **AND** sodium chloride 0.9% **AND** sodium chloride 0.9%, Flushing PICC Protocol **AND** sodium chloride 0.9% **AND** sodium chloride 0.9%    Review of patient's allergies indicates:  No Known Allergies  Objective:     Vital Signs (Most Recent):  Temp: 98 °F (36.7 °C) (08/20/17 0730)  Pulse: 110 (08/20/17 0930)  Resp: (!) 28 (08/20/17 0930)  BP: (!) 84/0 (08/20/17 0730)  SpO2: 100 % (08/20/17 0300) Vital Signs (24h Range):  Temp:  [98 °F (36.7 °C)-98.8 °F (37.1 °C)] 98 °F (36.7 °C)  Pulse:  [] 110  Resp:  [15-30] 28  SpO2:  [100 %] 100 %  BP: (81-90)/(0) 84/0  Arterial Line BP: ()/(59-75) 87/68     Weight: 95.8 kg (211 lb 3.2 oz)  Body mass index is 32.11 kg/m².      Intake/Output Summary (Last 24 hours) at 08/20/17 0950  Last data filed at 08/20/17 0900    Gross per 24 hour   Intake          2127.96 ml   Output             3246 ml   Net         -1118.04 ml       Hemodynamic Parameters:           Physical Exam   Constitutional: He appears well-developed and well-nourished.   HENT:   Head: Normocephalic and atraumatic.   Eyes: EOM are normal. Pupils are equal, round, and reactive to light.   Cardiovascular: Normal rate, regular rhythm and normal heart sounds.  Exam reveals no gallop and no friction rub.    No murmur heard.  + LVAD hum    Pulmonary/Chest: Effort normal. No respiratory distress. He has no wheezes. He has no rales.   Abdominal: Soft. Bowel sounds are normal.   Musculoskeletal:   LUE Edema    Neurological: He is alert.   Nursing note and vitals reviewed.      Significant Labs:  CBC:    Recent Labs  Lab 08/20/17  0349   WBC 16.78*  16.78*  16.78*   RBC 2.59*  2.59*  2.59*   HGB 7.6*  7.6*  7.6*   HCT 22.0*  22.0*  22.0*     235  235   MCV 85  85  85   MCH 29.3  29.3  29.3   MCHC 34.5  34.5  34.5     BNP:    Recent Labs  Lab 08/18/17  0230   BNP 2,538*     CMP:    Recent Labs  Lab 08/20/17  0349   *  125*  125*   CALCIUM 8.3*  8.3*  8.3*   ALBUMIN 1.8*  1.8*   PROT 5.7*  5.7*     142  142   K 4.2  4.2  4.2   CO2 21*  21*  21*     106  106   *  128*  128*   CREATININE 3.0*  3.0*  3.0*   ALKPHOS 260*  260*   ALT 74*  74*   AST 76*  76*   BILITOT 1.8*  1.8*      Coagulation:     Recent Labs  Lab 08/20/17  0349   INR 1.2   APTT 31.2     LDH:    Recent Labs  Lab 08/18/17  0230 08/19/17  0355 08/20/17  0349   * 308* 373*     Microbiology:  Microbiology Results (last 7 days)     Procedure Component Value Units Date/Time    IV catheter culture [[id number]] Collected:  08/16/17 1730    Order Status:  Completed Specimen:  Catheter Tip from Catheter Tip, PICC Updated:  08/18/17 1055     Aerobic Culture - Cath tip No growth    Blood culture [[id number]] Collected:  08/11/17 1326    Order Status:   Completed Specimen:  Blood from Peripheral, Forearm, Left Updated:  08/16/17 1812     Blood Culture, Routine No growth after 5 days.    Blood culture [[id number]] Collected:  08/09/17 0733    Order Status:  Completed Specimen:  Blood from Peripheral, Antecubital, Left Updated:  08/14/17 1022     Blood Culture, Routine No growth after 5 days.          I have reviewed all pertinent labs within the past 24 hours.    Estimated Creatinine Clearance: 26.8 mL/min (based on Cr of 3).    Diagnostic Results:  I have reviewed and interpreted all pertinent imaging results/findings within the past 24 hours.    Assessment and Plan:     Upper GI bleed    -GI following. EGD done. NG currently out will discuss with CTS replacing        Bacteremia    -ESBL from blood culture 8/9   -Ertapenem ( MERRITT < 0.5). ID following        Atrial fibrillation    -AC, Amio per C TS          LVAD (left ventricular assist device) present    - LVAD HM III. Placed this admit 7/27/17  - CTS Primary  - chest closure (7/28/17) and extubated (7/29/17)  -Reintubated 8/9/17 and extubated 8/13/17  -Now on Dopamine,   - Speed 5200  - LDH stable  - AC per CTS                    Atrial tachycardia    - [id number] - 3  -Rec restarting Amio. AC per CTS        AICD discharge    - appropriate in the setting of VT aggravated by underlying AT/AFL (earlier during the admission)  - 7/28 - setting of AF undersense - device reprogrammed to VVI 80  - tachy therapy turned off  - Rec restarting Amio  - EP planning to do lead revision         Hepatitis B core antibody positive since 2012    - will defer liver biopsy as CTS not requiring it  - ID consult cleared the patient for sx  - case discussed with hepatology, low suspicion for liver involvement        V-tach    - Rec restarting Amio        Hyperlipidemia    - Rec resuming pravastatin once liver enzymes stabilize             MELISSA Long  Heart Transplant  [facility]

## 2017-08-20 NOTE — ASSESSMENT & PLAN NOTE
Patient with nonoliguric INDIRA, likely ischemic (v. septic ATN) from renal hypoperfusion given cardiomyopathy.  Patient creatinine had remained stable, has maintained relative balance in I/O's, do not suspect elevated BUN indicative of uremia (elevation likely secondary to TPN administration and possible blood loss, agree with PRBC).  Discussed with primary team (Dr. Downing), requested renal replacement therapy for clearance only.  Patient seen and examined on SLED 8.16, 6hours, no net UF to prevent any hemodynamic instability that may have lead to further renal injury.  BUN/Cr slightly elevated from yesterday, however, would recommend furosemide 80mg IV bid for now, will continue to monitor labs, I/O's closely (will consider RRT if no improvement).  Patient/wife voiced understanding of above.

## 2017-08-20 NOTE — PLAN OF CARE
Problem: Patient Care Overview  Goal: Plan of Care Review  No acute events overnight. VSS. Pt remains on 2L nasal cannula SpO2 intermittently picks up and has been %. POCT glucoses montiored no insulin coverage needed. Gtts infusing: TPN at 50ml/hr, Dobutamine at 2.5mcg/kg/hr, Dopamine at 3mcg/kg/min, Protonix at 8mg/hr, and heparin at 200U/hr. 80mg Lasix IVP given with adequate response in UO. CVP 12. Labs trended. See VAD flowsheets for details. See flowsheets for intake and output and assessment details Plan of care reviewed with patient and patients spouse. Will continue to monitor

## 2017-08-20 NOTE — PT/OT/SLP PROGRESS
Physical Therapy  Treatment    Suman Hayden   MRN: [id number]   Admitting Diagnosis: Acute on chronic combined systolic and diastolic heart failure    PT Received On: 08/19/17  PT Start Time: 1100     PT Stop Time: 1130    PT Total Time (min): 30 min       Billable Minutes:  Therapeutic Exercise 30    Treatment Type: Treatment  PT/PTA: PT     PTA Visit Number: 0       General Precautions: Standard,  (fall, sternal, LVAD)  Orthopedic Precautions: N/A   Braces:      Do you have any cultural, spiritual, Taoism conflicts, given your current situation?: none noted     Subjective:   Communicated with nurse prior to session.        Pain/Comfort  Pain Rating 1: 0/10  Pain Rating Post-Intervention 1: 0/10     Objective:   Patient found with: telemetry, arterial line, pulse ox (continuous), NG tube, oxygen, SCD, delgado catheter (VAD)     Functional Mobility:  Bed Mobility:   Rolling/Turning Right: Total assistance  Supine to Sit: Total Assistance, With assist of 2 (pt sat on EOB x 5 min with mod-max assist. )    Transfers: pt sat in chair 2.5 hours.  Sit <> Stand Assistance: max assist  Sit <> Stand Assistive Device: No Assistive Device  Bed <> Chair Technique: Squat Pivot  Bed <> Chair Assistance: max assist  Bed <> Chair Assistive Device: No Assistive Device     Gait:   Gait Distance: not tested. see above        AM-PAC 6 CLICK MOBILITY  How much help from another person does this patient currently need?   1 = Unable, Total/Dependent Assistance  2 = A lot, Maximum/Moderate Assistance  3 = A little, Minimum/Contact Guard/Supervision  4 = None, Modified [place]/Independent     Turning over in bed (including adjusting bedclothes, sheets and blankets)?: 2  Sitting down on and standing up from a chair with arms (e.g., wheelchair, bedside commode, etc.): 2  Moving from lying on back to sitting on the side of the bed?: 2  Moving to and from a bed to a chair (including a wheelchair)?: 2  Need to walk in hospital room?:  1  Climbing 3-5 steps with a railing?: 1  Total Score: 10     AM-PAC Raw Score CMS G-Code Modifier Level of Impairment Assistance   6 % Total / Unable   7 - 9 CM 80 - 100% Maximal Assist   10 - 14 CL 60 - 80% Moderate Assist   15 - 19 CK 40 - 60% Moderate Assist   20 - 22 CJ 20 - 40% Minimal Assist   23 CI 1-20% SBA / CGA   24 CH 0% Independent/ Mod I      Patient left supine with all lines intact, call button in reach and wife present.     Assessment:  Suman Hayden is a 67 y.o. male with a medical diagnosis of Acute on chronic combined systolic and diastolic heart failure and presents with decreased mobility, transfers, balance, strength, and decreased distance ambulated.  Pt will continue to benefit from PT.     Rehab identified problem list/impairments: Rehab identified problem list/impairments: weakness, impaired endurance, impaired functional mobilty, gait instability, impaired balance, decreased lower extremity function     Rehab potential is fair.     Activity tolerance: Fair     Discharge recommendations: Discharge Facility/Level Of Care Needs: rehabilitation facility      Barriers to discharge: Barriers to Discharge: Decreased caregiver support (wife will not be able to assist pt at current functional level. )     Equipment recommendations: Equipment Needed After Discharge:  (will determine DME needs closer to discharge.)      GOALS:          Physical Therapy Goals                 Problem: Physical Therapy Goal      Goal Priority Disciplines Outcome Goal Variances Interventions   Physical Therapy Goal       PT/OT, PT Ongoing (interventions implemented as appropriate)       Description:  Goals to be met by: 17      Patient will increase functional independence with mobility by performin. Supine to sit with MInimal Assistance- not met  2. Sit to supine with MInimal Assistance - not met  3. Sit to stand transfer with Minimal Assistance- not met  4. Bed to chair transfer with Minimal  Assistance- not met  5. Gait  x 50 feet with Minimal Assistance. - not met  6. Lower extremity exercise program x15 reps, with supervision, in order to increase LE strength and (I) with functional mobility. - not met                               PLAN:    Patient to be seen 6 x/week  to address the above listed problems via gait training, therapeutic activities, therapeutic exercises  Plan of Care expires: 09/09/17  Plan of Care reviewed with: patient, spouse        Leslee T Ventura, PT  08/20/2017

## 2017-08-20 NOTE — PROGRESS NOTES
Daily E and M and VAD Interrogation Note    Reason for Visit:  Patient is seen in follow up for management of:  [] HeartMate II  [] Heartware [] Total artificial heart       [] ECMO           [x] Other - HM III     Interval History:  [] Interval history unobtainable due to intubation.  The [x] implant/[] explant date was 7/27/17    Events - Bleeding decreased in NGT, ~500. Removed by patient this AM. H/H stable. Melanotic stools. PPI gtt. dobutamine at 2.5, dopamine at 3.                Review of Systems:   Negative except as above.      Medications:  Current Facility-Administered Medications   Medication Dose Route Frequency Provider Last Rate Last Dose    albumin human 5% bottle 500 mL  500 mL Intravenous PRN Shayne Espinoza MD   500 mL at 08/09/17 0700    albuterol nebulizer solution 2.5 mg  2.5 mg Nebulization Q4H PRN Shayne Espinoza MD        bisacodyl suppository 10 mg  10 mg Rectal Daily PRN Shayne Espinoza MD   10 mg at 08/06/17 2036    dextrose 50% injection 12.5 g  12.5 g Intravenous PRN Charbel Ritter MD        DOBUtamine 1000 mg in D5W 250 mL infusion (premix non-titrating)  2.5 mcg/kg/min (Dosing Weight) Intravenous Continuous Shayne Espinoza MD 3 mL/hr at 08/20/17 1300 2.5 mcg/kg/min at 08/20/17 1300    DOPamine 400 mg in dextrose 5 % 250 mL infusion (premix) (NON-TITRATING)  3 mcg/kg/min (Dosing Weight) Intravenous Continuous Shayne Espinoza MD 9 mL/hr at 08/20/17 1300 3 mcg/kg/min at 08/20/17 1300    EPINEPHrine (ADRENALIN) 4 mg in sodium chloride 0.9% 250 mL infusion  0.01 mcg/kg/min (Dosing Weight) Intravenous Continuous Shayne Espinoza MD   Stopped at 08/15/17 1500    ertapenem (INVANZ) 1 g in sodium chloride 0.9 % 100 mL IVPB (ready to mix system)  1 g Intravenous Q24H Edwige Clay  mL/hr at 08/19/17 2138 1 g at 08/19/17 2138    furosemide injection 80 mg  80 mg Intravenous BID Sharlene Tran MD   80 mg at 08/20/17 1016    glucagon  (human recombinant) injection 1 mg  1 mg Intramuscular PRN Charbel Ritter MD        heparin 25,000 units in dextrose 5% 250 mL (100 units/mL) infusion (heparin infusion)  300 Units/hr Intravenous Continuous Sarah Rao MD 2 mL/hr at 08/20/17 1300 200 Units/hr at 08/20/17 1300    insulin aspart pen 0-5 Units  0-5 Units Subcutaneous Q4H PRN Charbel Ritter MD   2 Units at 08/10/17 0751    ondansetron injection 4 mg  4 mg Intravenous Q6H PRN Shayne Espinoza MD   4 mg at 08/08/17 2112    pantoprazole 40 mg in dextrose 5 % 100 mL infusion (ready to mix system)  8 mg/hr Intravenous Continuous Shayne Espinoza MD 20 mL/hr at 08/20/17 1300 8 mg/hr at 08/20/17 1300    sodium chloride 0.9% flush 10 mL  10 mL Intravenous Q6H Sunny Downing MD   10 mL at 08/19/17 1145    And    sodium chloride 0.9% flush 10 mL  10 mL Intravenous PRN Sunny Downing MD   10 mL at 08/10/17 1151    sodium chloride 0.9% flush 10 mL  10 mL Intravenous Q6H Sunny Downing MD   10 mL at 08/18/17 1200    And    sodium chloride 0.9% flush 10 mL  10 mL Intravenous PRN Sunny Downing MD        TPN ADULT CENTRAL LINE CUSTOM   Intravenous Continuous Mary Kumar MD 50 mL/hr at 08/20/17 1300      TPN ADULT CENTRAL LINE CUSTOM   Intravenous Continuous Mary Kumar MD        warfarin tablet 3 mg  3 mg Oral Once Sharlene Tran MD         Physical Examination:  Vital Signs:   Vitals:    08/20/17 1300   BP:    Pulse: 107   Resp: (!) 32   Temp:      Cardiovascular:  [x] Regular rate and rhythm [] Irregular []  None (MATT) []  Other  []  No edema [x]  Edema present  [x]  Clear to auscultation  []  Rales to []  Coarse  []  No rales but   [] Pedal Pulses absent  [x]  Pulses  + throughout  Skin:  Incision is [x]  Clean, dry and intact.  []  Other   Sternum:  [x]  Stable []  Unstable  Driveline(s):   [x]  Clean, dry and intact. []  Other     Labs:  BMP  Lab Results   Component Value Date     08/20/2017    K 4.3  08/20/2017     08/20/2017    CO2 21 (L) 08/20/2017     (H) 08/20/2017    CREATININE 3.0 (H) 08/20/2017    CALCIUM 8.2 (L) 08/20/2017    ANIONGAP 15 08/20/2017    ESTGFRAFRICA 23.7 (A) 08/20/2017    EGFRNONAA 20.5 (A) 08/20/2017     Magnesium   Date Value Ref Range Status   08/20/2017 2.1 1.6 - 2.6 mg/dL Final     Lab Results   Component Value Date    WBC 16.88 (H) 08/20/2017    HGB 7.3 (L) 08/20/2017    HCT 21.8 (L) 08/20/2017    MCV 84 08/20/2017     08/20/2017     Lab Results   Component Value Date    INR 1.2 08/20/2017    INR 1.3 (H) 08/19/2017    INR 1.4 (H) 08/18/2017     BNP   Date Value Ref Range Status   08/18/2017 2,538 (H) 0 - 99 pg/mL Final     Comment:     Values of less than 100 pg/ml are consistent with non-CHF populations.   08/16/2017 2,542 (H) 0 - 99 pg/mL Final     Comment:     Values of less than 100 pg/ml are consistent with non-CHF populations.   08/14/2017 2,873 (H) 0 - 99 pg/mL Final     Comment:     Values of less than 100 pg/ml are consistent with non-CHF populations.     LD   Date Value Ref Range Status   08/20/2017 373 (H) 110 - 260 U/L Final     Comment:     Results are increased in hemolyzed samples.   08/19/2017 308 (H) 110 - 260 U/L Final     Comment:     Results are increased in hemolyzed samples.   08/18/2017 264 (H) 110 - 260 U/L Final     Comment:     Results are increased in hemolyzed samples.     X-Rays:  [x]  I reviewed today's Chest x-ray    Procedure:  Device Interrogation including analysis of device parameters.  Current Settings   [x]  Ventricular Assist Device  []  Total Artificial Heart interrogated  Review of device function is [x]  Stable []  Other   TXP LVAD INTERROGATIONS 8/20/2017 8/20/2017 8/20/2017 8/20/2017 8/20/2017 8/20/2017 8/20/2017   Type HeartMate3 HeartMate3 HeartMate3 HeartMate3 HeartMate3 HeartMate3 HeartMate3   Flow 3.9 4.1 4.1 4.0 4.2 4.2 4.1   Speed 5200 5200 5200 5200 5200 5200 5200   PI 4.1 3.6 3.6 3.6 3.5 3.6 3.7   Power ([place])  3.5 3.5 3.6 3.5 3.5 3.5 3.5   LSL 4800 4800 4800 4800 4800 4800 4800   Pulsatility - - - - - - -   Some recent data might be hidden       Assessment:  [x]  Primary Cardiomyopathy [x]  Congestive Heart Failure   []  Atrial Fibrillation []  Ventricular Tachycardia   [x]  Aftercare cardiac device [x]  Long term (current) use of anticoagulants   []  Ventilator-associated pneumonia []  Pneumonia viral, unspecified   []  Pneumonia, bacterial, unspecified []  Pneumonia, organism unspecified   [x]  Hemorrhage of GI tract, unspecified    []  Nosebleed []  Driveline infection   []  Infection VAD device []  New onset of    []        Plan:  [x]  Interval history obtained from ICU attending team member during rounding today  [x]  VAD/MATT teaching performed with patient  [x]  Mobilization / Physical Therapy ongoing  [x]  Anticoagulation [x]  Ongoing []  Held  []  Studies ordered  []   To OR today for closure.       Total time spent was 30 minutes.  Of which more than 50 percent of the care dominated counseling and coordinating care with different team members. The VAD was interrogated and all parameters were WNL and no significant findings were found in the history. All these findings are documented in the note above.    Neuro:  - Alert and oriented    Resp:  - Extubated  - Resp cultures with ESBL - Ertapenem per ID   - Minimize supplemental O2  - Pulmonary toilet    CV:  - HDS; LVAD numbers stable  - LDH from 308 to 378  - Dobutamine at 2.5  - Dopamine at 3  - Vaso and epi weaned off  - Hold  given GI bleed  - LVAD numbers stable      Heme:  - Hgb/Hct stable  - Continue to trend  - INR 1.2 this AM   - PICC in place  - Holding ASA 2/2 bleeding  - Coumadin - 3mg today  - Heparin - PTT 40-50 today    ID:  - afebrile  - Ertapenem started for ESBL pos resp culture  - WBC 16.8  - PICC replaced 8/17  - Appreciate ID recs  - continue to monitor     Renal:  - Singer in place  - Strict I/Os   - Adequate UOP  - Lasix gtt off  - BUN  128 this AM   - Lasix 80 mg BID     FEN/GI:  - Strict NPO  - Protonix gtt, continue until improvement in melena  - Replace lytes PRN  - Continue TPN    Endo:  - Endocrine following, appreciate assistance  - Accuchecks  - Insulin ssi    Dispo:  - Continue ICU care.     Date of service:  08/20/2017     Sharlene Mendoza MD

## 2017-08-20 NOTE — PROGRESS NOTES
[facility]  Nephrology  Progress Note    Patient Name: Suman Hayden  MRN: [id number]  Admission Date: 7/18/2017  Hospital Length of Stay: 33 days  Attending Provider: Sunny Downing MD   Primary Care Physician: Joe Ernst MD  Principal Problem:Acute on chronic combined systolic and diastolic heart failure    Subjective:     HPI: Mr. Will is a 66 yo AAM with PMHx of NICM, HTN, HLD, and CKD III who presented to the hospital on 7/18 after syncopal episode at home.  He underwent LVAD placement on 7/27.  Labs that day showed a spike in his SCr to 1.5, but he quickly returned to baseline (1.2-1.3) and stayed stable until 08/01 when he increased to 1.5 and has steadily remained above baseline since, up to 2.3 on consult.  He has remained on lasix infusion with intermittent dosages of diuril to aid in diuresis.  On 08/3, there was a reported decrease in LVAD flows which resolved with decrease in lasix infusion and administration of 1 unit of PRBCs, likely causing decrease renal perfusion leading to an ischemic event.  He continues with adequate UOP on lasix gtt. He was transferred to the floor from ICU on 08/08 and was noted to become hypotensive with a doppler pressure of 58, since been requiring pressors for BP support.  Nephrology was consulted for INDIRA and decreased UOP.    Interval History: No acute events overnight. Patient responsive to questioning, denies any respiratory distress this am.     Review of patient's allergies indicates:  No Known Allergies  Current Facility-Administered Medications   Medication Frequency    albumin human 5% bottle 500 mL PRN    albuterol nebulizer solution 2.5 mg Q4H PRN    bisacodyl suppository 10 mg Daily PRN    dextrose 50% injection 12.5 g PRN    DOBUtamine 1000 mg in D5W 250 mL infusion (premix non-titrating) Continuous    DOPamine 400 mg in dextrose 5 % 250 mL infusion (premix) (NON-TITRATING) Continuous    EPINEPHrine (ADRENALIN) 4 mg in sodium  chloride 0.9% 250 mL infusion Continuous    ertapenem (INVANZ) 1 g in sodium chloride 0.9 % 100 mL IVPB (ready to mix system) Q24H    furosemide injection 80 mg BID    glucagon (human recombinant) injection 1 mg PRN    heparin 25,000 units in dextrose 5% 250 mL (100 units/mL) infusion (heparin infusion) Continuous    insulin aspart pen 0-5 Units Q4H PRN    ondansetron injection 4 mg Q6H PRN    pantoprazole 40 mg in dextrose 5 % 100 mL infusion (ready to mix system) Continuous    sodium chloride 0.9% flush 10 mL Q6H    And    sodium chloride 0.9% flush 10 mL PRN    sodium chloride 0.9% flush 10 mL Q6H    And    sodium chloride 0.9% flush 10 mL PRN    TPN ADULT CENTRAL LINE CUSTOM Continuous    TPN ADULT CENTRAL LINE CUSTOM Continuous    warfarin tablet 3 mg Once       Objective:     Vital Signs (Most Recent):  Temp: 98.6 °F (37 °C) (08/20/17 1500)  Pulse: 92 (08/20/17 1600)  Resp: 17 (08/20/17 1600)  BP: (!) 88/0 (08/20/17 1500)  SpO2: 100 % (08/20/17 0300)  O2 Device (Oxygen Therapy): nasal cannula (08/20/17 1500) Vital Signs (24h Range):  Temp:  [97.8 °F (36.6 °C)-98.8 °F (37.1 °C)] 98.6 °F (37 °C)  Pulse:  [] 92  Resp:  [15-32] 17  SpO2:  [100 %] 100 %  BP: (84-90)/(0) 88/0  Arterial Line BP: ()/(50-75) 92/65     Weight: 95.8 kg (211 lb 3.2 oz) (08/20/17 0615)  Body mass index is 32.11 kg/m².  Body surface area is 2.14 meters squared.    I/O last 3 completed shifts:  In: 3084 [I.V.:1163.4; NG/GT:150]  Out: 4941 [Urine:4240; Drains:700; Stool:1]    Physical Exam   Cardiovascular: Exam reveals no gallop and no friction rub.    No murmur heard.  Mechanical device sounds   Pulmonary/Chest: Effort normal and breath sounds normal. No respiratory distress. He has no wheezes. He has no rales.   Abdominal: Soft. There is no tenderness.   Musculoskeletal: He exhibits edema.   Neurological: He is alert.   Skin: Skin is warm and dry. No rash noted.   Vitals reviewed.      Significant Labs:    Reviewed    Significant Imaging:  Reviewed    Assessment/Plan:     Acute renal failure with acute tubular necrosis superimposed on stage 3 chronic kidney disease    Patient with nonoliguric INDIRA, likely ischemic (v. septic ATN) from renal hypoperfusion given cardiomyopathy.  Patient creatinine had remained stable, has maintained relative balance in I/O's, do not suspect elevated BUN indicative of uremia (elevation likely secondary to TPN administration and possible blood loss, agree with PRBC).  Discussed with primary team (Dr. Downing), requested renal replacement therapy for clearance only.  Patient seen and examined on SLED 8.16, 6hours, no net UF to prevent any hemodynamic instability that may have lead to further renal injury.  BUN/Cr slightly elevated from yesterday, however, would recommend furosemide 80mg IV bid for now, will continue to monitor labs, I/O's closely (will consider RRT if no improvement).  Patient/wife voiced understanding of above.                David Sal MD  Nephrology  [facility]

## 2017-08-20 NOTE — PROGRESS NOTES
Dr. Donwing updated on pts current VS, Gtts, and labs. Orders received and carried out to increase heparin to 200U/h and give 80mg Lasix IVP. Will continue to monitor.

## 2017-08-20 NOTE — PT/OT/SLP PROGRESS
"Occupational Therapy  Treatment    Suman Hayden   MRN: [id number]   Admitting Diagnosis: Acute on chronic combined systolic and diastolic heart failure  S/p LVAD     OT Date of Treatment: 08/20/17   OT Start Time: 0919  OT Stop Time: 0945  OT Total Time (min): 26 min    Billable Minutes:  Therapeutic Activity 26 minutes    General Precautions: Standard, LVAD, fall, sternal  Orthopedic Precautions: N/A  Braces: N/A    Subjective:  Communicated with RN prior to session. Pt agreeable to OT.  "I'm going to do what I need to do."  Pain/Comfort  Pain Rating 1: 0/10  Pain Rating Post-Intervention 1: 0/10    Objective:  Patient found with: arterial line, blood pressure cuff, peripheral IV, pulse ox (continuous), telemetry, delgado     Functional Mobility:  Bed Mobility:  Scooting/Bridging: Maximum Assistance to EOB  Supine to Sit: Maximum Assistance    Transfers:  Sit <> Stand Assistance: Total Assistance (Max x 2) from EOB x 2 trials and from bedside chair  Sit <> Stand Assistive Device: No Assistive Device  Bed <> Chair Technique: Stand Pivot  Bed <> Chair Transfer Assistance: Total Assistance x 2 person assist  Bed <> Chair Assistive Device: No Assistive Device    Activities of Daily Living:  LE Dressing Level of Assistance: Total assistance to don socks EOB    Balance:   Static Sit: POOR+: Needs MINIMAL assist to maintain  Dynamic Sit: POOR: N/A  Static Stand: 0: Needs MAXIMAL assist to maintain   Dynamic stand: 0: N/A    Therapeutic Activities and Exercises:  Pt supine in bed upon OT entry; required Max A for bed mobility; able to sit EOB ~10 min ranging from CGA-Max A for postural control, R lateral lean and head forward; reviewed sternal precautions; Max A x 2 for 2 sit to stand trials from EOB and Total x 2 for pivot to bedside chair; once up in chair, stood once more with Max A x 2 for RN to change dressing on backside; pt able to maintain static stand for less than one min each trial; performed B UE therex to increase " "ROM and reduce edema; B UE elevated on pillows in bedside chair    AM-PAC 6 CLICK ADL   How much help from another person does this patient currently need?   1 = Unable, Total/Dependent Assistance  2 = A lot, Maximum/Moderate Assistance  3 = A little, Minimum/Contact Guard/Supervision  4 = None, Modified [place]/Independent    Putting on and taking off regular lower body clothing? : 1  Bathing (including washing, rinsing, drying)?: 1  Toileting, which includes using toilet, bedpan, or urinal? : 1  Putting on and taking off regular upper body clothing?: 1  Taking care of personal grooming such as brushing teeth?: 1  Eating meals?: 1  Total Score: 6     AM-PAC Raw Score CMS "G-Code Modifier Level of Impairment Assistance   6 % Total / Unable   7 - 8 CM 80 - 100% Maximal Assist   9-13 CL 60 - 80% Moderate Assist   14 - 19 CK 40 - 60% Moderate Assist   20 - 22 CJ 20 - 40% Minimal Assist   23 CI 1-20% SBA / CGA   24 CH 0% Independent/ Mod I       Patient left up in chair with all lines intact, call button in reach and RN and wife present    ASSESSMENT:  Suman Hayden is a 67 y.o. male with a medical diagnosis of Acute on chronic combined systolic and diastolic heart failure s/p LVAD and presents with deficits listed below. Pt requiring 2 person assist for T/Fs and unable to stand for greater than one minute at a time, also limited by edema. Pt would continue to benefit from OT to increase independence. Recommend rehab upon D/C.    Rehab identified problem list/impairments: Rehab identified problem list/impairments: weakness, impaired self care skills, impaired functional mobilty, impaired balance, decreased safety awareness, impaired cardiopulmonary response to activity, edema    Rehab potential is good.    Activity tolerance: Good    Discharge recommendations: Discharge Facility/Level Of Care Needs: rehabilitation facility     Barriers to discharge: Barriers to Discharge: Decreased caregiver support (at " current functional level)    Equipment recommendations:  (TBD)     GOALS:    Occupational Therapy Goals        Problem: Occupational Therapy Goal    Goal Priority Disciplines Outcome Interventions   Occupational Therapy Goal     OT, PT/OT Ongoing (interventions implemented as appropriate)    Description:  Goals to be met by:  2 weeks 8/28/17    Patient will increase functional independence with ADLs by performing:  Feeding: Independent   UE Dressing with Supervision.  LE Dressing with Supervision.  Grooming while standing with Supervision.  Toileting from toilet with Supervision for hygiene and clothing management.   Stand pivot transfers with Supervision.  Toilet transfer to toilet with Supervision.  Pt will be supervision  with LVAD yasmin't.                  Multidisciplinary Problems (Resolved)        Problem: Occupational Therapy Goal    Goal Priority Disciplines Outcome Interventions   Occupational Therapy Goal   (Resolved)     OT, PT/OT  Error    Description:  Goals to be met by: 8/04/2017    Patient will increase functional independence with ADLs by performing:    Increased functional strength to 5/5 for improved ADL performance.  Upper extremity exercise program and R LE ankle pumps  3x 10 reps per handout, with independence.                      Plan:  Patient to be seen 6 x/week to address the above listed problems via self-care/home management, therapeutic activities, therapeutic exercises  Plan of Care expires: 08/29/17  Plan of Care reviewed with: patient, spouse    DELMY Mohamud  08/20/2017

## 2017-08-20 NOTE — PROGRESS NOTES
Progress Note  Surgical Intensive Care    Admit Date: 7/18/2017  Post-operative Day: 3 Days Post-Op  Hospital Day: 34    SUBJECTIVE:     Follow-up For:  Procedure(s) (LRB):  ESOPHAGOGASTRODUODENOSCOPY (EGD) (N/A)   S/p sternotomy closure 7/28/17    Interval history: No acute events overnight. VSS. On 2L NC. Currently on Dobutatmine 2.5 and Dopamine 3. On TPN.      Continuous Infusions:   DOBUTamine 2.5 mcg/kg/min (08/20/17 1100)    DOPamine 3 mcg/kg/min (08/20/17 1100)    epinephrine infusion Stopped (08/15/17 1500)    heparin (porcine) in 5 % dex 200 Units/hr (08/20/17 1100)    pantoprazole 40 mg in dextrose 5 % 100 mL infusion (ready to mix system) 8 mg/hr (08/20/17 1100)    TPN ADULT CENTRAL LINE CUSTOM 50 mL/hr at 08/20/17 1100    TPN ADULT CENTRAL LINE CUSTOM       Scheduled Meds:   ertapenem (INVANZ) IVPB  1 g Intravenous Q24H    furosemide  80 mg Intravenous BID    sodium chloride 0.9%  10 mL Intravenous Q6H    sodium chloride 0.9%  10 mL Intravenous Q6H    warfarin  3 mg Oral Once     PRN Meds:albumin human 5%, albuterol sulfate, bisacodyl, dextrose 50%, glucagon (human recombinant), insulin aspart, ondansetron, Flushing PICC Protocol **AND** sodium chloride 0.9% **AND** sodium chloride 0.9%, Flushing PICC Protocol **AND** sodium chloride 0.9% **AND** sodium chloride 0.9%    Review of patient's allergies indicates:  No Known Allergies    OBJECTIVE:     Vital Signs (Most Recent)  Temp: 97.8 °F (36.6 °C) (08/20/17 1100)  Pulse: 96 (08/20/17 1100)  Resp: (!) 21 (08/20/17 1100)  BP: (!) 90/0 (08/20/17 1100)  SpO2: 100 % (08/20/17 0300)    Vital Signs Range (Last 24H):  Temp:  [97.8 °F (36.6 °C)-98.8 °F (37.1 °C)]   Pulse:  []   Resp:  [15-28]   BP: (84-90)/(0)   SpO2:  [100 %]   Arterial Line BP: ()/(50-75)     I & O (Last 24H):    Intake/Output Summary (Last 24 hours) at 08/20/17 1147  Last data filed at 08/20/17 1100   Gross per 24 hour   Intake          2107.96 ml   Output              3136 ml   Net         -1028.04 ml        Physical Exam    Constitutional: He appears well-developed and well-nourished. No distress. He is alert  HENT:   Head: Normocephalic and atraumatic.    Neck: Normal range of motion. Neck supple.   Cardiovascular: Intact distal pulses.    LVAD hum present.   Pulmonary/Chest: Effort normal. No respiratory distress..   Abdominal: Soft. He exhibits no distension.   Skin: Skin is warm and dry.     Laboratory (Last 24H):  CBC:    Recent Labs  Lab 08/20/17  0349   WBC 16.78*  16.78*  16.78*   HGB 7.6*  7.6*  7.6*   HCT 22.0*  22.0*  22.0*     235  235     CMP:    Recent Labs  Lab 08/20/17  0349   CALCIUM 8.3*  8.3*  8.3*   ALBUMIN 1.8*  1.8*   PROT 5.7*  5.7*     142  142   K 4.2  4.2  4.2   CO2 21*  21*  21*     106  106   *  128*  128*   CREATININE 3.0*  3.0*  3.0*   ALKPHOS 260*  260*   ALT 74*  74*   AST 76*  76*   BILITOT 1.8*  1.8*     Echo    CONCLUSIONS     1 - Heartmate III LVAD; speed 5100, aortic valve opens intermittently, septum is midline.     2 - Severe left atrial enlargement.     3 - Mild left ventricular enlargement.     4 - Severely depressed left ventricular systolic function (EF 10-15%).     5 - Segmental wall motion abnormalities.     6 - Mildly depressed right ventricular systolic function .     7 - Mild tricuspid regurgitation.     ASSESSMENT/PLAN:     Neuro:  - alert and responding to commands  - sedation off    Resp:  - stable on NC  - Possible aspiration event causing sepsis - resp cultures growing ESBL   - wean supplemental O2 as tolerated     CV:  - HDS; LVAD numbers stable  - Dobutatmine 2.5 and Dopamine 3., mgmt per CTS  - epi and levo off  - Hold  given GI bleed  - f/u Echo    Heme:  - Hgb/Hct 7.6/22  - s/p 1U pRBCs  - Continue to trend  - INR down to 1.8  - Heparin 200u/hr    ID:  - afebrile  - WBC 16.78  - Likely aspiration pneumonia   - bacteremic and resp cultures ESBL+, on  ertapenem  - ID consulted  - blood cx 8/11 NGTD      Renal:  - Singer in place  - Strict I/Os   - Follow UOP, s/p 80mg Lasix w/ adequate response  - Cr 3.0  - CRRT off     FEN/GI:  - Protonix BID for possible GIB  - Replace lytes PRN  - holding miralax     Endo:  - Endocrine following  - Accuchecks  - SSI     Dispo:  - Continue ICU care  - wean drips as tolerated per CTS    Christian Melissa MD, PGY-3  Surgery Intensive Care Unit

## 2017-08-21 ENCOUNTER — DOCUMENTATION ONLY (OUTPATIENT)
Dept: CARDIOLOGY | Facility: CLINIC | Age: 67
End: 2017-08-21

## 2017-08-21 LAB
ALBUMIN SERPL BCP-MCNC: 1.7 G/DL
ALBUMIN SERPL BCP-MCNC: 1.8 G/DL
ALBUMIN SERPL BCP-MCNC: 1.8 G/DL
ALP SERPL-CCNC: 245 U/L
ALP SERPL-CCNC: 256 U/L
ALP SERPL-CCNC: 257 U/L
ALP SERPL-CCNC: 257 U/L
ALP SERPL-CCNC: 272 U/L
ALT SERPL W/O P-5'-P-CCNC: 51 U/L
ALT SERPL W/O P-5'-P-CCNC: 53 U/L
ALT SERPL W/O P-5'-P-CCNC: 56 U/L
ALT SERPL W/O P-5'-P-CCNC: 60 U/L
ALT SERPL W/O P-5'-P-CCNC: 60 U/L
ANION GAP SERPL CALC-SCNC: 10 MMOL/L
ANION GAP SERPL CALC-SCNC: 13 MMOL/L
ANION GAP SERPL CALC-SCNC: 14 MMOL/L
ANION GAP SERPL CALC-SCNC: 15 MMOL/L
ANION GAP SERPL CALC-SCNC: 15 MMOL/L
ANISOCYTOSIS BLD QL SMEAR: SLIGHT
APTT BLDCRRT: 30.3 SEC
AST SERPL-CCNC: 57 U/L
AST SERPL-CCNC: 60 U/L
AST SERPL-CCNC: 62 U/L
AST SERPL-CCNC: 62 U/L
AST SERPL-CCNC: 72 U/L
BASO STIPL BLD QL SMEAR: ABNORMAL
BASO STIPL BLD QL SMEAR: ABNORMAL
BASOPHILS # BLD AUTO: 0.02 K/UL
BASOPHILS # BLD AUTO: 0.03 K/UL
BASOPHILS # BLD AUTO: 0.04 K/UL
BASOPHILS NFR BLD: 0.1 %
BASOPHILS NFR BLD: 0.2 %
BASOPHILS NFR BLD: 0.2 %
BILIRUB DIRECT SERPL-MCNC: 1.2 MG/DL
BILIRUB SERPL-MCNC: 1.6 MG/DL
BILIRUB SERPL-MCNC: 1.6 MG/DL
BILIRUB SERPL-MCNC: 1.7 MG/DL
BNP SERPL-MCNC: 1804 PG/ML
BUN SERPL-MCNC: 135 MG/DL
BUN SERPL-MCNC: 136 MG/DL
BUN SERPL-MCNC: 136 MG/DL
BUN SERPL-MCNC: 137 MG/DL
BUN SERPL-MCNC: 92 MG/DL
CALCIUM SERPL-MCNC: 7.9 MG/DL
CALCIUM SERPL-MCNC: 8 MG/DL
CALCIUM SERPL-MCNC: 8.2 MG/DL
CHLORIDE SERPL-SCNC: 106 MMOL/L
CHLORIDE SERPL-SCNC: 107 MMOL/L
CHLORIDE SERPL-SCNC: 109 MMOL/L
CO2 SERPL-SCNC: 23 MMOL/L
CO2 SERPL-SCNC: 24 MMOL/L
CREAT SERPL-MCNC: 2.2 MG/DL
CREAT SERPL-MCNC: 3.1 MG/DL
CREAT SERPL-MCNC: 3.1 MG/DL
CREAT SERPL-MCNC: 3.2 MG/DL
CREAT SERPL-MCNC: 3.2 MG/DL
CRP SERPL-MCNC: 75.6 MG/L
DIASTOLIC DYSFUNCTION: YES
DIFFERENTIAL METHOD: ABNORMAL
EOSINOPHIL # BLD AUTO: 0.2 K/UL
EOSINOPHIL # BLD AUTO: 0.3 K/UL
EOSINOPHIL # BLD AUTO: 0.3 K/UL
EOSINOPHIL NFR BLD: 1.1 %
EOSINOPHIL NFR BLD: 1.3 %
EOSINOPHIL NFR BLD: 1.6 %
EOSINOPHIL NFR BLD: 1.9 %
EOSINOPHIL NFR BLD: 1.9 %
ERYTHROCYTE [DISTWIDTH] IN BLOOD BY AUTOMATED COUNT: 17.3 %
ERYTHROCYTE [DISTWIDTH] IN BLOOD BY AUTOMATED COUNT: 17.3 %
ERYTHROCYTE [DISTWIDTH] IN BLOOD BY AUTOMATED COUNT: 17.7 %
ERYTHROCYTE [DISTWIDTH] IN BLOOD BY AUTOMATED COUNT: 17.7 %
ERYTHROCYTE [DISTWIDTH] IN BLOOD BY AUTOMATED COUNT: 17.8 %
EST. GFR  (AFRICAN AMERICAN): 22 ML/MIN/1.73 M^2
EST. GFR  (AFRICAN AMERICAN): 22 ML/MIN/1.73 M^2
EST. GFR  (AFRICAN AMERICAN): 22.8 ML/MIN/1.73 M^2
EST. GFR  (AFRICAN AMERICAN): 22.8 ML/MIN/1.73 M^2
EST. GFR  (AFRICAN AMERICAN): 34.6 ML/MIN/1.73 M^2
EST. GFR  (NON AFRICAN AMERICAN): 19 ML/MIN/1.73 M^2
EST. GFR  (NON AFRICAN AMERICAN): 19 ML/MIN/1.73 M^2
EST. GFR  (NON AFRICAN AMERICAN): 19.7 ML/MIN/1.73 M^2
EST. GFR  (NON AFRICAN AMERICAN): 19.7 ML/MIN/1.73 M^2
EST. GFR  (NON AFRICAN AMERICAN): 29.9 ML/MIN/1.73 M^2
GIANT PLATELETS BLD QL SMEAR: PRESENT
GLUCOSE SERPL-MCNC: 112 MG/DL
GLUCOSE SERPL-MCNC: 123 MG/DL
GLUCOSE SERPL-MCNC: 124 MG/DL
GLUCOSE SERPL-MCNC: 124 MG/DL
GLUCOSE SERPL-MCNC: 132 MG/DL
HCT VFR BLD AUTO: 19 %
HCT VFR BLD AUTO: 19.8 %
HCT VFR BLD AUTO: 20.1 %
HCT VFR BLD AUTO: 21.1 %
HCT VFR BLD AUTO: 21.1 %
HGB BLD-MCNC: 6.4 G/DL
HGB BLD-MCNC: 6.5 G/DL
HGB BLD-MCNC: 6.9 G/DL
HGB BLD-MCNC: 7 G/DL
HGB BLD-MCNC: 7 G/DL
HYPOCHROMIA BLD QL SMEAR: ABNORMAL
INR PPP: 1.2
LDH SERPL L TO P-CCNC: 258 U/L
LYMPHOCYTES # BLD AUTO: 1.1 K/UL
LYMPHOCYTES # BLD AUTO: 1.2 K/UL
LYMPHOCYTES # BLD AUTO: 1.5 K/UL
LYMPHOCYTES NFR BLD: 7.5 %
LYMPHOCYTES NFR BLD: 7.8 %
LYMPHOCYTES NFR BLD: 8.2 %
MAGNESIUM SERPL-MCNC: 1.7 MG/DL
MAGNESIUM SERPL-MCNC: 1.8 MG/DL
MCH RBC QN AUTO: 27.4 PG
MCH RBC QN AUTO: 27.5 PG
MCH RBC QN AUTO: 27.7 PG
MCH RBC QN AUTO: 27.7 PG
MCH RBC QN AUTO: 28.8 PG
MCHC RBC AUTO-ENTMCNC: 32.8 G/DL
MCHC RBC AUTO-ENTMCNC: 33.2 G/DL
MCHC RBC AUTO-ENTMCNC: 33.2 G/DL
MCHC RBC AUTO-ENTMCNC: 33.7 G/DL
MCHC RBC AUTO-ENTMCNC: 34.3 G/DL
MCV RBC AUTO: 81 FL
MCV RBC AUTO: 83 FL
MCV RBC AUTO: 83 FL
MCV RBC AUTO: 84 FL
MCV RBC AUTO: 84 FL
MONOCYTES # BLD AUTO: 0.8 K/UL
MONOCYTES # BLD AUTO: 1.1 K/UL
MONOCYTES NFR BLD: 5.2 %
MONOCYTES NFR BLD: 5.2 %
MONOCYTES NFR BLD: 5.4 %
MONOCYTES NFR BLD: 5.7 %
MONOCYTES NFR BLD: 6 %
NEUTROPHILS # BLD AUTO: 12.4 K/UL
NEUTROPHILS # BLD AUTO: 12.6 K/UL
NEUTROPHILS # BLD AUTO: 12.7 K/UL
NEUTROPHILS # BLD AUTO: 12.7 K/UL
NEUTROPHILS # BLD AUTO: 15.6 K/UL
NEUTROPHILS NFR BLD: 84.5 %
NEUTROPHILS NFR BLD: 85 %
NEUTROPHILS NFR BLD: 85 %
NEUTROPHILS NFR BLD: 85.1 %
NEUTROPHILS NFR BLD: 85.3 %
OVALOCYTES BLD QL SMEAR: ABNORMAL
PHOSPHATE SERPL-MCNC: 3.6 MG/DL
PHOSPHATE SERPL-MCNC: 5.8 MG/DL
PLATELET # BLD AUTO: 232 K/UL
PLATELET # BLD AUTO: 241 K/UL
PLATELET # BLD AUTO: 242 K/UL
PLATELET # BLD AUTO: 242 K/UL
PLATELET # BLD AUTO: 299 K/UL
PLATELET BLD QL SMEAR: ABNORMAL
PMV BLD AUTO: 11.3 FL
PMV BLD AUTO: 11.9 FL
PMV BLD AUTO: 12.6 FL
POCT GLUCOSE: 117 MG/DL (ref 70–110)
POCT GLUCOSE: 118 MG/DL (ref 70–110)
POCT GLUCOSE: 142 MG/DL (ref 70–110)
POIKILOCYTOSIS BLD QL SMEAR: SLIGHT
POLYCHROMASIA BLD QL SMEAR: ABNORMAL
POTASSIUM SERPL-SCNC: 3.5 MMOL/L
POTASSIUM SERPL-SCNC: 3.5 MMOL/L
POTASSIUM SERPL-SCNC: 3.6 MMOL/L
POTASSIUM SERPL-SCNC: 3.8 MMOL/L
POTASSIUM SERPL-SCNC: 4 MMOL/L
PREALB SERPL-MCNC: 13 MG/DL
PROT SERPL-MCNC: 5.6 G/DL
PROT SERPL-MCNC: 5.7 G/DL
PROT SERPL-MCNC: 5.7 G/DL
PROT SERPL-MCNC: 5.8 G/DL
PROT SERPL-MCNC: 5.9 G/DL
PROTHROMBIN TIME: 12.9 SEC
RBC # BLD AUTO: 2.34 M/UL
RBC # BLD AUTO: 2.36 M/UL
RBC # BLD AUTO: 2.4 M/UL
RBC # BLD AUTO: 2.53 M/UL
RBC # BLD AUTO: 2.53 M/UL
RETIRED EF AND QEF - SEE NOTES: 20 (ref 55–65)
SODIUM SERPL-SCNC: 142 MMOL/L
SODIUM SERPL-SCNC: 143 MMOL/L
SODIUM SERPL-SCNC: 144 MMOL/L
TARGETS BLD QL SMEAR: ABNORMAL
WBC # BLD AUTO: 14.69 K/UL
WBC # BLD AUTO: 14.84 K/UL
WBC # BLD AUTO: 15.09 K/UL
WBC # BLD AUTO: 15.09 K/UL
WBC # BLD AUTO: 18.68 K/UL

## 2017-08-21 PROCEDURE — 80053 COMPREHEN METABOLIC PANEL: CPT

## 2017-08-21 PROCEDURE — 63600175 PHARM REV CODE 636 W HCPCS: Performed by: STUDENT IN AN ORGANIZED HEALTH CARE EDUCATION/TRAINING PROGRAM

## 2017-08-21 PROCEDURE — 27000248 HC VAD-ADDITIONAL DAY

## 2017-08-21 PROCEDURE — 83880 ASSAY OF NATRIURETIC PEPTIDE: CPT

## 2017-08-21 PROCEDURE — 27000221 HC OXYGEN, UP TO 24 HOURS

## 2017-08-21 PROCEDURE — B4185 PARENTERAL SOL 10 GM LIPIDS: HCPCS | Performed by: STUDENT IN AN ORGANIZED HEALTH CARE EDUCATION/TRAINING PROGRAM

## 2017-08-21 PROCEDURE — 84134 ASSAY OF PREALBUMIN: CPT

## 2017-08-21 PROCEDURE — 83615 LACTATE (LD) (LDH) ENZYME: CPT

## 2017-08-21 PROCEDURE — 80048 BASIC METABOLIC PNL TOTAL CA: CPT

## 2017-08-21 PROCEDURE — 99233 SBSQ HOSP IP/OBS HIGH 50: CPT | Mod: GC,,, | Performed by: ANESTHESIOLOGY

## 2017-08-21 PROCEDURE — 99233 SBSQ HOSP IP/OBS HIGH 50: CPT | Mod: 24,,, | Performed by: THORACIC SURGERY (CARDIOTHORACIC VASCULAR SURGERY)

## 2017-08-21 PROCEDURE — 93750 INTERROGATION VAD IN PERSON: CPT | Mod: ,,, | Performed by: THORACIC SURGERY (CARDIOTHORACIC VASCULAR SURGERY)

## 2017-08-21 PROCEDURE — 80100020 *HC HEMODIALYSIS 1:1 - ARF

## 2017-08-21 PROCEDURE — 86140 C-REACTIVE PROTEIN: CPT

## 2017-08-21 PROCEDURE — 97530 THERAPEUTIC ACTIVITIES: CPT

## 2017-08-21 PROCEDURE — 85730 THROMBOPLASTIN TIME PARTIAL: CPT

## 2017-08-21 PROCEDURE — 97110 THERAPEUTIC EXERCISES: CPT

## 2017-08-21 PROCEDURE — 93306 TTE W/DOPPLER COMPLETE: CPT | Mod: 26,,, | Performed by: INTERNAL MEDICINE

## 2017-08-21 PROCEDURE — 20000000 HC ICU ROOM

## 2017-08-21 PROCEDURE — 80076 HEPATIC FUNCTION PANEL: CPT

## 2017-08-21 PROCEDURE — 99233 SBSQ HOSP IP/OBS HIGH 50: CPT | Mod: ,,, | Performed by: INTERNAL MEDICINE

## 2017-08-21 PROCEDURE — 92526 ORAL FUNCTION THERAPY: CPT

## 2017-08-21 PROCEDURE — C8929 TTE W OR WO FOL WCON,DOPPLER: HCPCS

## 2017-08-21 PROCEDURE — 97535 SELF CARE MNGMENT TRAINING: CPT

## 2017-08-21 PROCEDURE — 83735 ASSAY OF MAGNESIUM: CPT | Mod: 91

## 2017-08-21 PROCEDURE — 85610 PROTHROMBIN TIME: CPT

## 2017-08-21 PROCEDURE — 85025 COMPLETE CBC W/AUTO DIFF WBC: CPT | Mod: 91

## 2017-08-21 PROCEDURE — 25000003 PHARM REV CODE 250: Performed by: STUDENT IN AN ORGANIZED HEALTH CARE EDUCATION/TRAINING PROGRAM

## 2017-08-21 PROCEDURE — C9113 INJ PANTOPRAZOLE SODIUM, VIA: HCPCS | Performed by: STUDENT IN AN ORGANIZED HEALTH CARE EDUCATION/TRAINING PROGRAM

## 2017-08-21 PROCEDURE — 84100 ASSAY OF PHOSPHORUS: CPT | Mod: 91

## 2017-08-21 RX ORDER — MAGNESIUM SULFATE HEPTAHYDRATE 40 MG/ML
2 INJECTION, SOLUTION INTRAVENOUS ONCE
Status: COMPLETED | OUTPATIENT
Start: 2017-08-21 | End: 2017-08-21

## 2017-08-21 RX ORDER — POTASSIUM CHLORIDE 7.45 MG/ML
10 INJECTION INTRAVENOUS
Status: DISCONTINUED | OUTPATIENT
Start: 2017-08-21 | End: 2017-08-21

## 2017-08-21 RX ORDER — POTASSIUM CHLORIDE 14.9 MG/ML
20 INJECTION INTRAVENOUS ONCE
Status: COMPLETED | OUTPATIENT
Start: 2017-08-21 | End: 2017-08-21

## 2017-08-21 RX ADMIN — POTASSIUM CHLORIDE 20 MEQ: 200 INJECTION, SOLUTION INTRAVENOUS at 05:08

## 2017-08-21 RX ADMIN — MAGNESIUM SULFATE IN WATER 2 G: 40 INJECTION, SOLUTION INTRAVENOUS at 05:08

## 2017-08-21 RX ADMIN — ERTAPENEM SODIUM 1 G: 1 INJECTION, POWDER, LYOPHILIZED, FOR SOLUTION INTRAMUSCULAR; INTRAVENOUS at 09:08

## 2017-08-21 RX ADMIN — SOYBEAN OIL 250 ML: 20 INJECTION, SOLUTION INTRAVENOUS at 09:08

## 2017-08-21 RX ADMIN — CALCIUM GLUCONATE: 94 INJECTION, SOLUTION INTRAVENOUS at 09:08

## 2017-08-21 RX ADMIN — PANTOPRAZOLE SODIUM 8 MG/HR: 40 INJECTION, POWDER, FOR SOLUTION INTRAVENOUS at 09:08

## 2017-08-21 RX ADMIN — FUROSEMIDE 80 MG: 10 INJECTION, SOLUTION INTRAVENOUS at 08:08

## 2017-08-21 NOTE — PT/OT/SLP PROGRESS
Speech Language Pathology  Treatment    Suman Hayden   MRN: [id number]   [id number] A     Admitting Diagnosis: Acute on chronic combined systolic and diastolic heart failure    Diet recommendations:    Liquid Diet Level: Clears, Thin   ·  Feed only when awake/alert,   · No straws  · HOB to 90 degrees    SLP Treatment Date: 08/21/17  Speech Start Time: 1440     Speech Stop Time: 1451     Speech Total (min): 11 min       TREATMENT BILLABLE MINUTES:  Treatment Swallowing Dysfunction 11    Has the patient been evaluated by SLP for swallowing? : Yes  Keep patient NPO?: No   General Precautions: Standard, aspiration, NPO, LVAD, fall, sternal, contact  Current Respiratory Status: nasal cannula       Subjective:  Pt awake. Wife present at bedside    Pain/Comfort  Pain Rating 1: 0/10    Objective:   Patient found with: telemetry, pulse ox (continuous), blood pressure cuff, oxygen (LVAD)  Pt seen for ongoing assessment of swallow function. HOB elevated as high as possible. Pt assessed with thin liquids via ice chips x1, tsp x1, single clinician assisted cup sips x4, puree via tsp x2.  Oral phase wfl. Pharyngeal phase c/b no overt s/s of aspiration. Pt with gagging and expectoration of 50% of applesauce trials. Pt refused further trials.       :  Assessment:  Suman Hayden is a 67 y.o. male with a medical diagnosis of Acute on chronic combined systolic and diastolic heart failure and presents with esophageal dysphagia.    Discharge recommendations: Discharge Facility/Level Of Care Needs: rehabilitation facility     Goals:    SLP Goals        Problem: SLP Goal    Goal Priority Disciplines Outcome   SLP Goal     SLP Ongoing (interventions implemented as appropriate)   Description:  Speech Language Pathology Goals  Goals expected to be met by 8/28  1. Pt will tolerate thin liquids with no overt s/s of aspiration.   2. Pt will tolerate trials of soft solids with adequate a-p transit and no overt s/s of aspiration                    Multidisciplinary Problems (Resolved)        Problem: SLP Goal    Goal Priority Disciplines Outcome   SLP Goal   (Resolved)     SLP Outcome(s) achieved   Description:  Speech Language Pathology Goals  Goals expected to be met by 8/4:  1. Pt will tolerate a dental soft diet and thin liquids without s/s of aspiration.                          Plan:   Patient to be seen Therapy Frequency: 5 x/week   Plan of Care expires: 09/12/17  Plan of Care reviewed with: patient  SLP Follow-up?: Yes       SHERRI Hart, CCC-SLP, CLC  Speech Language Pathologist  Certified Lactation Counselor  [phone]  8/21/2017

## 2017-08-21 NOTE — PLAN OF CARE
Problem: SLP Goal  Goal: SLP Goal  Speech Language Pathology Goals  Goals expected to be met by 8/28  1. Pt will tolerate thin liquids with no overt s/s of aspiration.   2. Pt will tolerate trials of soft solids with adequate a-p transit and no overt s/s of aspiration       Outcome: Ongoing (interventions implemented as appropriate)  Continue current plan of care. Goals remain appropriate. SHERRI Lofton, CCC-SLP, [facility] 8/21/2017

## 2017-08-21 NOTE — PLAN OF CARE
Problem: Occupational Therapy Goal  Goal: Occupational Therapy Goal  Goals to be met by:  2 weeks 8/28/17    Patient will increase functional independence with ADLs by performing:  Feeding: Independent   UE Dressing with Supervision.  LE Dressing with Supervision.  Grooming while standing with Supervision.  Toileting from toilet with Supervision for hygiene and clothing management.   Stand pivot transfers with Supervision.  Toilet transfer to toilet with Supervision.  Pt will be supervision  with LVAD yasmin't.       Outcome: Ongoing (interventions implemented as appropriate)  The above goals remain appropriate. DELMY Lucero  8/21/2017'

## 2017-08-21 NOTE — PROGRESS NOTES
Daily E and M and VAD Interrogation Note    Reason for Visit:  Patient is seen in follow up for management of:  [] HeartMate II  [] Heartware [] Total artificial heart       [] ECMO           [x] Other - HM III     Interval History:  [] Interval history unobtainable due to intubation.  The [x] implant/[] explant date was 7/27/17    Events -  No emesis since NGT removal yesterday.  H/H stable.  Had large BM overnight, no blood noted.  On PPI, dobutamine, dopamine       Review of Systems:   Negative except as above.      Medications:  Current Facility-Administered Medications   Medication Dose Route Frequency Provider Last Rate Last Dose    albumin human 5% bottle 500 mL  500 mL Intravenous PRN Shayne Espinoza MD   500 mL at 08/09/17 0700    albuterol nebulizer solution 2.5 mg  2.5 mg Nebulization Q4H PRN Shayne Espinoza MD        bisacodyl suppository 10 mg  10 mg Rectal Daily PRN Shayne Espinoza MD   10 mg at 08/06/17 2036    dextrose 50% injection 12.5 g  12.5 g Intravenous PRN Charbel Ritter MD        DOBUtamine 1000 mg in D5W 250 mL infusion (premix non-titrating)  2.5 mcg/kg/min (Dosing Weight) Intravenous Continuous Shayne Espinoza MD 3 mL/hr at 08/21/17 0800 2.5 mcg/kg/min at 08/21/17 0800    DOPamine 400 mg in dextrose 5 % 250 mL infusion (premix) (NON-TITRATING)  3 mcg/kg/min (Dosing Weight) Intravenous Continuous Shayne Espinoza MD 9 mL/hr at 08/21/17 0800 3 mcg/kg/min at 08/21/17 0800    EPINEPHrine (ADRENALIN) 4 mg in sodium chloride 0.9% 250 mL infusion  0.01 mcg/kg/min (Dosing Weight) Intravenous Continuous Shayne Espinoza MD   Stopped at 08/15/17 1500    ertapenem (INVANZ) 1 g in sodium chloride 0.9 % 100 mL IVPB (ready to mix system)  1 g Intravenous Q24H Edwige Clay  mL/hr at 08/20/17 2032 1 g at 08/20/17 2032    furosemide injection 80 mg  80 mg Intravenous BID Sharlene Tran MD   80 mg at 08/21/17 0815    glucagon (human recombinant)  injection 1 mg  1 mg Intramuscular PRN Charbel Ritter MD        heparin 25,000 units in dextrose 5% 250 mL (100 units/mL) infusion (heparin infusion)  400 Units/hr Intravenous Continuous Sunny Downing MD 4 mL/hr at 08/21/17 0800 400 Units/hr at 08/21/17 0800    insulin aspart pen 0-5 Units  0-5 Units Subcutaneous Q4H PRN Charbel Ritter MD   2 Units at 08/10/17 0751    ondansetron injection 4 mg  4 mg Intravenous Q6H PRN Shayne Espinoza MD   4 mg at 08/08/17 2112    pantoprazole 40 mg in dextrose 5 % 100 mL infusion (ready to mix system)  8 mg/hr Intravenous Continuous Shayne Espinoza MD 20 mL/hr at 08/21/17 0800 8 mg/hr at 08/21/17 0800    sodium chloride 0.9% flush 10 mL  10 mL Intravenous Q6H Sunny Downing MD   10 mL at 08/20/17 1300    And    sodium chloride 0.9% flush 10 mL  10 mL Intravenous PRN Sunny Downing MD   10 mL at 08/10/17 1151    sodium chloride 0.9% flush 10 mL  10 mL Intravenous Q6H Sunny Downing MD   10 mL at 08/20/17 1300    And    sodium chloride 0.9% flush 10 mL  10 mL Intravenous PRN Sunny Downing MD        TPN ADULT CENTRAL LINE CUSTOM   Intravenous Continuous Mary Kumar MD 50 mL/hr at 08/21/17 0800      warfarin tablet 3 mg  3 mg Oral Once Sharlene Tran MD         Physical Examination:  Vital Signs:   Vitals:    08/21/17 0816   BP:    Pulse: 95   Resp: 16   Temp:      Cardiovascular:  [x] Regular rate and rhythm [] Irregular []  None (MATT) []  Other  []  No edema [x]  Edema present  [x]  Clear to auscultation  []  Rales to []  Coarse  []  No rales but   [] Pedal Pulses absent  [x]  Pulses  + throughout  Skin:  Incision is [x]  Clean, dry and intact.  []  Other   Sternum:  [x]  Stable []  Unstable  Driveline(s):   [x]  Clean, dry and intact. []  Other     Labs:  BMP  Lab Results   Component Value Date     08/21/2017     08/21/2017    K 3.5 08/21/2017    K 3.5 08/21/2017     08/21/2017     08/21/2017    CO2 23 08/21/2017    CO2  23 08/21/2017     (H) 08/21/2017     (H) 08/21/2017    CREATININE 3.1 (H) 08/21/2017    CREATININE 3.1 (H) 08/21/2017    CALCIUM 8.2 (L) 08/21/2017    CALCIUM 8.2 (L) 08/21/2017    ANIONGAP 15 08/21/2017    ANIONGAP 15 08/21/2017    ESTGFRAFRICA 22.8 (A) 08/21/2017    ESTGFRAFRICA 22.8 (A) 08/21/2017    EGFRNONAA 19.7 (A) 08/21/2017    EGFRNONAA 19.7 (A) 08/21/2017     Magnesium   Date Value Ref Range Status   08/21/2017 1.8 1.6 - 2.6 mg/dL Final     Lab Results   Component Value Date    WBC 15.09 (H) 08/21/2017    WBC 15.09 (H) 08/21/2017    HGB 7.0 (L) 08/21/2017    HGB 7.0 (L) 08/21/2017    HCT 21.1 (L) 08/21/2017    HCT 21.1 (L) 08/21/2017    MCV 83 08/21/2017    MCV 83 08/21/2017     08/21/2017     08/21/2017     Lab Results   Component Value Date    INR 1.2 08/21/2017    INR 1.2 08/20/2017    INR 1.3 (H) 08/19/2017     BNP   Date Value Ref Range Status   08/21/2017 1,804 (H) 0 - 99 pg/mL Final     Comment:     Values of less than 100 pg/ml are consistent with non-CHF populations.   08/18/2017 2,538 (H) 0 - 99 pg/mL Final     Comment:     Values of less than 100 pg/ml are consistent with non-CHF populations.   08/16/2017 2,542 (H) 0 - 99 pg/mL Final     Comment:     Values of less than 100 pg/ml are consistent with non-CHF populations.     LD   Date Value Ref Range Status   08/21/2017 258 110 - 260 U/L Final     Comment:     Results are increased in hemolyzed samples.   08/20/2017 373 (H) 110 - 260 U/L Final     Comment:     Results are increased in hemolyzed samples.   08/19/2017 308 (H) 110 - 260 U/L Final     Comment:     Results are increased in hemolyzed samples.     X-Rays:  [x]  I reviewed today's Chest x-ray    Procedure:  Device Interrogation including analysis of device parameters.  Current Settings   [x]  Ventricular Assist Device  []  Total Artificial Heart interrogated  Review of device function is [x]  Stable []  Other   TXP LVAD INTERROGATIONS 8/21/2017 8/21/2017  8/21/2017 8/21/2017 8/21/2017 8/21/2017 8/21/2017   Type HeartMate3 HeartMate3 HeartMate3 HeartMate3 HeartMate3 HeartMate3 HeartMate3   Flow 4 4 4.2 4.1 4.0 4.2 4.1   Speed 5200 5200 5200 5200 5200 5200 5200   PI 3.5 3.4 3.4 3.6 3.7 3.6 3.8   Power ([place]) 3.6 3.6 3.7 3.5 3.5 3.4 3.5   LSL - 4800 4800 4800 4800 4800 4800   Pulsatility - Intermittent pulse - - - - No Pulse   Some recent data might be hidden       Assessment:  [x]  Primary Cardiomyopathy [x]  Congestive Heart Failure   []  Atrial Fibrillation []  Ventricular Tachycardia   [x]  Aftercare cardiac device [x]  Long term (current) use of anticoagulants   []  Ventilator-associated pneumonia []  Pneumonia viral, unspecified   []  Pneumonia, bacterial, unspecified []  Pneumonia, organism unspecified   [x]  Hemorrhage of GI tract, unspecified    []  Nosebleed []  Driveline infection   []  Infection VAD device []  New onset of    []        Plan:  [x]  Interval history obtained from ICU attending team member during rounding today  [x]  VAD/MATT teaching performed with patient  [x]  Mobilization / Physical Therapy ongoing  [x]  Anticoagulation [x]  Ongoing []  Held  []  Studies ordered  []   To OR today for closure.       Total time spent was 30 minutes.  Of which more than 50 percent of the care dominated counseling and coordinating care with different team members. The VAD was interrogated and all parameters were WNL and no significant findings were found in the history. All these findings are documented in the note above.    Neuro:  - Alert and oriented    Resp:  - Extubated  - Resp cultures with ESBL - Ertapenem per ID   - Minimize supplemental O2  - Pulmonary toilet    CV:  - HDS; LVAD numbers stable  - LDH from 378 to 253  - Dobutamine at 2.5  - Dopamine at 3  - Vaso and epi weaned off  - Hold  given GI bleed  - LVAD numbers stable      Heme:  - Hgb/Hct stable  - Continue to trend  - INR 1.2 this AM   - PICC in place  - Holding ASA 2/2 bleeding  -  Coumadin - 3mg   - Heparin - PTT 40-50 today    ID:  - afebrile  - Ertapenem started for ESBL pos resp culture  - WBC 15.09  - PICC replaced 8/17  - Appreciate ID recs  - continue to monitor     Renal:  - Singer in place  - Strict I/Os   - Adequate UOP  - Lasix gtt off  -  this AM   - Lasix 80 mg BID   - Nephro is following.  Likely to need clearance today    FEN/GI:  - Strict NPO  - Protonix gtt, continue until improvement in melena  - Replace lytes PRN  - Continue TPN    Endo:  - Endocrine following, appreciate assistance  - Accuchecks  - Insulin ssi    Dispo:  - Continue ICU care.     Date of service:  08/21/2017     Andres Uriarte MD     Cardiac Surgery Attending E and M (VAD) Note along with VAD Interrogation    I have seen and examined the patient and agree with the findings above    I also reviewed the patients clinical course and:  [x]  Hemodynamic & Respiratory paramters  [x]  Laboratory Data  [x]  Radiological studies     VAD Interrogated [x]      VAD Function is normal. Changes made []  None [x]        Interrogation of Ventricular assist device was performed with physician analysis of device parameters and review of device function. I have personally reviewed the interrogation findings and agree with findings as stated

## 2017-08-21 NOTE — PROGRESS NOTES
Spoke with PA regarding rise in BUN levels. Explained current TPN regimen is meeting 100% EPN, EEN. Will attempt to meet 75% EPN to assess if this will decrease BUN.    Please see note from 8/18 for full RD assessment. RD to follow-up.    Recommendations/Interventions:  1) Custom TPN: 320g D/77g AA @ 50 mL/hr + 250 mL 20% lipids to provide 1896 calories, 77 grams of protein, 1200 mL fluid. This would meet 98% EEN, 75% EPN.  2) RD to monitor & follow-up.    Thanks! Montse, RADHA [id number]

## 2017-08-21 NOTE — PLAN OF CARE
Problem: Physical Therapy Goal  Goal: Physical Therapy Goal  Goals to be met by: 17     Patient will increase functional independence with mobility by performin. Supine to sit with MInimal Assistance- not met  2. Sit to supine with MInimal Assistance - not met  3. Sit to stand transfer with Minimal Assistance- not met  4. Bed to chair transfer with Minimal Assistance- not met  5. Gait  x 50 feet with Minimal Assistance. - not met  6. Lower extremity exercise program x15 reps, with supervision, in order to increase LE strength and (I) with functional mobility. - not met       Outcome: Ongoing (interventions implemented as appropriate)  Goals reviewed and remain appropriate. Pt progressing towards goals.    Kely Willett, PT, DPT   2017  [phone]

## 2017-08-21 NOTE — SUBJECTIVE & OBJECTIVE
Interval History: Feeling OK this morning. Currently no complaints    Continuous Infusions:   DOBUTamine 5 mcg/kg/min (08/21/17 1400)    epinephrine infusion Stopped (08/15/17 1500)    heparin (porcine) in 5 % dex 400 Units/hr (08/21/17 1400)    pantoprazole 40 mg in dextrose 5 % 100 mL infusion (ready to mix system) 8 mg/hr (08/21/17 1400)    TPN ADULT CENTRAL LINE CUSTOM 50 mL/hr at 08/21/17 1400    TPN ADULT CENTRAL LINE CUSTOM       Scheduled Meds:   ertapenem (INVANZ) IVPB  1 g Intravenous Q24H    fat emulsion 20%  250 mL Intravenous Daily    sodium chloride 0.9%  10 mL Intravenous Q6H    sodium chloride 0.9%  10 mL Intravenous Q6H    warfarin  3 mg Oral Once     PRN Meds:albumin human 5%, albuterol sulfate, bisacodyl, dextrose 50%, glucagon (human recombinant), insulin aspart, ondansetron, Flushing PICC Protocol **AND** sodium chloride 0.9% **AND** sodium chloride 0.9%, Flushing PICC Protocol **AND** sodium chloride 0.9% **AND** sodium chloride 0.9%    Review of patient's allergies indicates:  No Known Allergies  Objective:     Vital Signs (Most Recent):  Temp: 98.8 °F (37.1 °C) (08/21/17 1100)  Pulse: 102 (08/21/17 1400)  Resp: (!) 22 (08/21/17 1400)  BP: (!) 92/0 (08/21/17 0700)  SpO2: 100 % (08/21/17 0600) Vital Signs (24h Range):  Temp:  [98.2 °F (36.8 °C)-98.8 °F (37.1 °C)] 98.8 °F (37.1 °C)  Pulse:  [] 102  Resp:  [13-26] 22  SpO2:  [100 %] 100 %  BP: (84-92)/(0) 92/0  Arterial Line BP: ()/(55-73) 103/60     Weight: 95.6 kg (210 lb 12.2 oz)  Body mass index is 32.05 kg/m².      Intake/Output Summary (Last 24 hours) at 08/21/17 1529  Last data filed at 08/21/17 1400   Gross per 24 hour   Intake          2180.56 ml   Output             3840 ml   Net         -1659.44 ml       Hemodynamic Parameters:           Physical Exam   Constitutional: He appears well-developed and well-nourished.   HENT:   Head: Normocephalic and atraumatic.   Eyes: EOM are normal. Pupils are equal, round, and  reactive to light.   Cardiovascular: Normal rate, regular rhythm and normal heart sounds.  Exam reveals no gallop and no friction rub.    No murmur heard.  + LVAD hum    Pulmonary/Chest: Effort normal. No respiratory distress. He has no wheezes. He has no rales.   Abdominal: Soft. Bowel sounds are normal.   Musculoskeletal: He exhibits no edema.   Neurological: He is alert.   Nursing note and vitals reviewed.      Significant Labs:  CBC:    Recent Labs  Lab 08/21/17  1227   WBC 14.69*   RBC 2.40*   HGB 6.9*   HCT 20.1*      MCV 84   MCH 28.8   MCHC 34.3     BNP:    Recent Labs  Lab 08/21/17  0300   BNP 1,804*     CMP:    Recent Labs  Lab 08/21/17  1227   *   CALCIUM 8.0*   ALBUMIN 1.8*   PROT 5.9*      K 4.0   CO2 23      *   CREATININE 3.2*   ALKPHOS 245*   ALT 56*   AST 72*   BILITOT 1.6*      Coagulation:     Recent Labs  Lab 08/21/17  0300   INR 1.2   APTT 30.3     LDH:    Recent Labs  Lab 08/19/17  0355 08/20/17  0349 08/21/17  0300   * 373* 258     Microbiology:  Microbiology Results (last 7 days)     Procedure Component Value Units Date/Time    IV catheter culture [[id number]] Collected:  08/16/17 1730    Order Status:  Completed Specimen:  Catheter Tip from Catheter Tip, PICC Updated:  08/18/17 1055     Aerobic Culture - Cath tip No growth    Blood culture [[id number]] Collected:  08/11/17 1326    Order Status:  Completed Specimen:  Blood from Peripheral, Forearm, Left Updated:  08/16/17 1812     Blood Culture, Routine No growth after 5 days.          I have reviewed all pertinent labs within the past 24 hours.    Estimated Creatinine Clearance: 25.1 mL/min (based on Cr of 3.2).    Diagnostic Results:  I have reviewed and interpreted all pertinent imaging results/findings within the past 24 hours.

## 2017-08-21 NOTE — PT/OT/SLP PROGRESS
"Occupational Therapy  Treatment    Suman Hayden   MRN: [id number]   Admitting Diagnosis: Acute on chronic combined systolic and diastolic heart failure    OT Date of Treatment: 08/21/17   OT Start Time: 1037  OT Stop Time: 1118  OT Total Time (min): 41 min    Billable Minutes:  Self Care/Home Management 30 and Therapeutic Activity 10    General Precautions: Standard, LVAD, fall, sternal  Orthopedic Precautions:    Braces:           Subjective:  Communicated with RN prior to session.  "I need the bed pan."    Pain/Comfort  Pain Rating 1: 0/10  Pain Rating Post-Intervention 1: 0/10    Objective:  Patient found with: blood pressure cuff, pulse ox (continuous), telemetry, arterial line, central line, PICC line     Functional Mobility:  Bed Mobility:  Rolling/Turning Right: Total assistance  Scooting/Bridging: Total Assistance  Supine to Sit: Total Assistance    Transfers:   Sit <> Stand Assistance: Total Assistance (from b/s chair x 3 trials and EOB)  Sit <> Stand Assistive Device: No Assistive Device  Bed <> Chair Technique: Stand Pivot  Bed <> Chair Transfer Assistance: Total Assistance (pt did demonstrate ability to shift weight and advance LE during transfer this session)  Bed <> Chair Assistive Device: No Assistive Device    Functional Ambulation: NT    Activities of Daily Living:  Feeding Level of Assistance: Activity did not occur  Feeding adaptive equipment:   UE Dressing Level of Assistance: Total assistance  UE adaptive equipment:   LE Dressing Level of Assistance: Total assistance  LE adaptive equipment:   Grooming Position: Seated  Grooming Level of Assistance: Total assistance  Toileting Where Assessed: Other (Comment) (bed pan in chair)  Toileting Level of Assistance: Total assistance        Therapeutic Activities and Exercises:  Pt sat EOB with Mod <> Max A for static sitting d/t falling to L.   Pt re-ed on controller placement during ADL/transfers  Pt performed B UE AAROM Ex's x 10 reps in all planes and " "completed hand pumps over heart for edema management  Pt stood from b/s chair with total A x 1 1/2 min during pericare     AM-PAC 6 CLICK ADL   How much help from another person does this patient currently need?   1 = Unable, Total/Dependent Assistance  2 = A lot, Maximum/Moderate Assistance  3 = A little, Minimum/Contact Guard/Supervision  4 = None, Modified [place]/Independent    Putting on and taking off regular lower body clothing? : 1  Bathing (including washing, rinsing, drying)?: 1  Toileting, which includes using toilet, bedpan, or urinal? : 1  Putting on and taking off regular upper body clothing?: 1  Taking care of personal grooming such as brushing teeth?: 1  Eating meals?: 1  Total Score: 6     AM-PAC Raw Score CMS "G-Code Modifier Level of Impairment Assistance   6 % Total / Unable   7 - 8 CM 80 - 100% Maximal Assist   9-13 CL 60 - 80% Moderate Assist   14 - 19 CK 40 - 60% Moderate Assist   20 - 22 CJ 20 - 40% Minimal Assist   23 CI 1-20% SBA / CGA   24 CH 0% Independent/ Mod I       Patient left up in chair with all lines intact, call button in reach and rn notified    ASSESSMENT:  Suman Hayden is a 67 y.o. male with a medical diagnosis of Acute on chronic combined systolic and diastolic heart failure and presents with weakness and decreased endurance requiring total care for all ADL s/p LVAD.    Rehab identified problem list/impairments: Rehab identified problem list/impairments: weakness, impaired endurance, impaired self care skills, impaired balance, gait instability, impaired functional mobilty, decreased coordination, impaired fine motor, edema, decreased ROM, decreased upper extremity function, decreased lower extremity function, impaired cardiopulmonary response to activity    Rehab potential is good.    Activity tolerance: Fair    Discharge recommendations: Discharge Facility/Level Of Care Needs: rehabilitation facility     Barriers to discharge: Barriers to Discharge: Decreased " caregiver support (at current functional level)    Equipment recommendations:  (TBD closer to d/c)     GOALS:    Occupational Therapy Goals        Problem: Occupational Therapy Goal    Goal Priority Disciplines Outcome Interventions   Occupational Therapy Goal     OT, PT/OT Ongoing (interventions implemented as appropriate)    Description:  Goals to be met by:  2 weeks 8/28/17    Patient will increase functional independence with ADLs by performing:  Feeding: Independent   UE Dressing with Supervision.  LE Dressing with Supervision.  Grooming while standing with Supervision.  Toileting from toilet with Supervision for hygiene and clothing management.   Stand pivot transfers with Supervision.  Toilet transfer to toilet with Supervision.  Pt will be supervision  with LVAD yasmin't.                  Multidisciplinary Problems (Resolved)        Problem: Occupational Therapy Goal    Goal Priority Disciplines Outcome Interventions   Occupational Therapy Goal   (Resolved)     OT, PT/OT  Error    Description:  Goals to be met by: 8/04/2017    Patient will increase functional independence with ADLs by performing:    Increased functional strength to 5/5 for improved ADL performance.  Upper extremity exercise program and R LE ankle pumps  3x 10 reps per handout, with independence.                      Plan:  Patient to be seen 6 x/week to address the above listed problems via self-care/home management, therapeutic activities, therapeutic exercises  Plan of Care expires: 08/29/17  Plan of Care reviewed with: patient         DELMY Lucero  08/21/2017

## 2017-08-21 NOTE — SUBJECTIVE & OBJECTIVE
Interval History:   No acute events overnight.  SCr remains stable, but BUN trending up to 136 this morning.  PM dose of lasix held yesterday, but received AM dose this morning.  Maintaining excellent UOP (3.8L).  No overt signs of uremia exist, slight confusion related to year.  CVP of 6.    Review of patient's allergies indicates:  No Known Allergies  Current Facility-Administered Medications   Medication Frequency    albumin human 5% bottle 500 mL PRN    albuterol nebulizer solution 2.5 mg Q4H PRN    bisacodyl suppository 10 mg Daily PRN    dextrose 50% injection 12.5 g PRN    DOBUtamine 1000 mg in D5W 250 mL infusion (premix non-titrating) Continuous    EPINEPHrine (ADRENALIN) 4 mg in sodium chloride 0.9% 250 mL infusion Continuous    ertapenem (INVANZ) 1 g in sodium chloride 0.9 % 100 mL IVPB (ready to mix system) Q24H    furosemide injection 80 mg BID    glucagon (human recombinant) injection 1 mg PRN    heparin 25,000 units in dextrose 5% 250 mL (100 units/mL) infusion (heparin infusion) Continuous    insulin aspart pen 0-5 Units Q4H PRN    ondansetron injection 4 mg Q6H PRN    pantoprazole 40 mg in dextrose 5 % 100 mL infusion (ready to mix system) Continuous    sodium chloride 0.9% flush 10 mL Q6H    And    sodium chloride 0.9% flush 10 mL PRN    sodium chloride 0.9% flush 10 mL Q6H    And    sodium chloride 0.9% flush 10 mL PRN    TPN ADULT CENTRAL LINE CUSTOM Continuous    warfarin tablet 3 mg Once       Objective:     Vital Signs (Most Recent):  Temp: 98.8 °F (37.1 °C) (08/21/17 0700)  Pulse: 95 (08/21/17 0816)  Resp: 16 (08/21/17 0816)  BP: (!) 92/0 (08/21/17 0700)  SpO2: 100 % (08/21/17 0600)  O2 Device (Oxygen Therapy): nasal cannula (08/21/17 0816) Vital Signs (24h Range):  Temp:  [98.2 °F (36.8 °C)-98.8 °F (37.1 °C)] 98.8 °F (37.1 °C)  Pulse:  [] 95  Resp:  [13-32] 16  SpO2:  [100 %] 100 %  BP: (84-92)/(0) 92/0  Arterial Line BP: ()/(55-72) 102/69     Weight: 95.6 kg  (210 lb 12.2 oz) (08/21/17 0451)  Body mass index is 32.05 kg/m².  Body surface area is 2.14 meters squared.    I/O last 3 completed shifts:  In: 3151.5 [I.V.:1348.9; NG/GT:45]  Out: 5786 [Urine:5585; Drains:200; Stool:1]    Physical Exam   Constitutional: He appears well-developed and well-nourished. No distress.   HENT:   Head: Normocephalic and atraumatic.   Eyes: EOM are normal.   Cardiovascular: Exam reveals no gallop and no friction rub.    No murmur heard.  Smooth LVAD hum   Pulmonary/Chest: Effort normal and breath sounds normal. No respiratory distress. He has no wheezes. He has no rales.   Abdominal: Soft. There is no tenderness.   Musculoskeletal: He exhibits no edema.   Neurological: He is alert.   Oriented to Person/place.  Unable to tell year, but knows president.   Skin: Skin is warm and dry. No rash noted. He is not diaphoretic.   Vitals reviewed.      Significant Labs:  CBC:   Recent Labs  Lab 08/21/17  0300   WBC 15.09*  15.09*   RBC 2.53*  2.53*   HGB 7.0*  7.0*   HCT 21.1*  21.1*     242   MCV 83  83   MCH 27.7  27.7   MCHC 33.2  33.2     CMP:   Recent Labs  Lab 08/21/17  0300   *  124*   CALCIUM 8.2*  8.2*   ALBUMIN 1.7*  1.7*   PROT 5.7*  5.7*     144   K 3.5  3.5   CO2 23  23     106   *  136*   CREATININE 3.1*  3.1*   ALKPHOS 257*  257*   ALT 60*  60*   AST 62*  62*   BILITOT 1.7*  1.7*

## 2017-08-21 NOTE — PLAN OF CARE
Problem: Patient Care Overview  Goal: Plan of Care Review  Outcome: Ongoing (interventions implemented as appropriate)  POC reviewed with the pt and his wife, questions and concerns addressed. VSS throughout shift. Pt had 5 BMs. Up to chair with Max assist for over 2 hours. Nurse at bedside for pt to have HD. Dopamine gtt d/c'd. Wife performed VAD dsg change. Refer to flowsheets for vitals, gtts, and assessments. Will continue to monitor.

## 2017-08-21 NOTE — PT/OT/SLP PROGRESS
Physical Therapy  Treatment    Suman Hayden   MRN: [id number]   Admitting Diagnosis: Acute on chronic combined systolic and diastolic heart failure    PT Received On: 08/21/17  PT Start Time: 1038     PT Stop Time: 1102    PT Total Time (min): 24 min       Billable Minutes:  Therapeutic Activity 24 (co-tx with OT)    Treatment Type: Treatment  PT/PTA: PT     PTA Visit Number: 0       General Precautions: Standard, fall, LVAD, sternal, contact  Orthopedic Precautions: N/A   Braces: N/A    Do you have any cultural, spiritual, Taoist conflicts, given your current situation?: none noted     Subjective:  Communicated with RN prior to session.  Pt agreeable to therapy.     Pain/Comfort  Pain Rating 1: 10/10  Location - Side 1: Left  Location - Orientation 1: generalized  Location 1: arm  Pain Addressed 1: Reposition, Distraction    Objective:   Patient found with: telemetry, arterial line, pulse ox (continuous), blood pressure cuff, delgado catheter, oxygen, PICC line (LVAD to wall power)    Functional Mobility:  Bed Mobility:   Rolling/Turning Right: Total assistance  Scooting/Bridging: Total Assistance  Supine to Sit: Total Assistance    Transfers:  Sit <> Stand Assistance: Total Assistance  Sit <> Stand Assistive Device: No Assistive Device  Bed <> Chair Technique: Stand Pivot  Bed <> Chair Assistance: Total Assistance  Bed <> Chair Assistive Device: No Assistive Device    Cues for weight-shifting and advancing LE    Gait: not performed this session; see above for stand pivot transfer details      Balance:   Static Sit: min-maxA  Dynamic Sit: maxA  Static Stand: totalA  Dynamic stand: totalA     Therapeutic Activities and Exercises:  Pt found on wall power and remained on wall power throughout session. Controller secured to pt prior to mobility.    Sitting EOB x~15 minutes with min-maxA for balance. Increased L lateral lean noted. Required v/c and t/c throughout for improved postural control including midline position,  anterior pelvic tilt, thoracic extension, and scapular retraction.   Performed seated AP x10 reps with cues throughout for exercise technique. Decreased DF ROM noted. Pt instructed to perform AP x10 reps/hour. Pt verbalized understanding.   OT remained at bedside to complete further therapy.     AM-PAC 6 CLICK MOBILITY   How much help from another person does this patient currently need?   1 = Unable, Total/Dependent Assistance  2 = A lot, Maximum/Moderate Assistance  3 = A little, Minimum/Contact Guard/Supervision  4 = None, Modified [place]/Independent    Turning over in bed (including adjusting bedclothes, sheets and blankets)?: 2  Sitting down on and standing up from a chair with arms (e.g., wheelchair, bedside commode, etc.): 2  Moving from lying on back to sitting on the side of the bed?: 2  Moving to and from a bed to a chair (including a wheelchair)?: 2  Need to walk in hospital room?: 1  Climbing 3-5 steps with a railing?: 1  Total Score: 10    AM-PAC Raw Score CMS G-Code Modifier Level of Impairment Assistance   6 % Total / Unable   7 - 9 CM 80 - 100% Maximal Assist   10 - 14 CL 60 - 80% Moderate Assist   15 - 19 CK 40 - 60% Moderate Assist   20 - 22 CJ 20 - 40% Minimal Assist   23 CI 1-20% SBA / CGA   24 CH 0% Independent/ Mod I     Patient left up in chair with all lines intact, call button in reach and OT present.    Assessment:  Suman Hayden is a 67 y.o. male with a medical diagnosis of Acute on chronic combined systolic and diastolic heart failure and presents s/p LVAD insert 7/27/17. Pt continues to require totalA to complete functional mobility. Decreased postural control noted when sitting EOB with L lateral lean, posterior pelvic tilt, and thoracic flexion, requires v/c and t/c for correction. Further functional mobility limited by weakness, impaired endurance, and decreased balance. Pt would continue to benefit from skilled acute PT in order to address current deficits and progress  functional mobility. Pt will require IP rehab upon d/c in order to maximize rehab potential.     Rehab identified problem list/impairments: Rehab identified problem list/impairments: weakness, impaired functional mobilty, gait instability, impaired endurance, impaired balance, impaired self care skills, decreased lower extremity function, decreased upper extremity function, edema, impaired skin, pain, decreased coordination, decreased safety awareness, impaired coordination, impaired cardiopulmonary response to activity, impaired fine motor    Rehab potential is good.    Activity tolerance: Fair    Discharge recommendations: Discharge Facility/Level Of Care Needs: rehabilitation facility     Barriers to discharge: Barriers to Discharge: Decreased caregiver support (at current functional level)    Equipment recommendations: Equipment Needed After Discharge:  (TBD)     GOALS:    Physical Therapy Goals        Problem: Physical Therapy Goal    Goal Priority Disciplines Outcome Goal Variances Interventions   Physical Therapy Goal     PT/OT, PT Ongoing (interventions implemented as appropriate)     Description:  Goals to be met by: 17     Patient will increase functional independence with mobility by performin. Supine to sit with MInimal Assistance- not met  2. Sit to supine with MInimal Assistance - not met  3. Sit to stand transfer with Minimal Assistance- not met  4. Bed to chair transfer with Minimal Assistance- not met  5. Gait  x 50 feet with Minimal Assistance. - not met  6. Lower extremity exercise program x15 reps, with supervision, in order to increase LE strength and (I) with functional mobility. - not met                        PLAN:    Patient to be seen 6 x/week  to address the above listed problems via gait training, therapeutic exercises, therapeutic activities  Plan of Care expires: 17  Plan of Care reviewed with: patient        Kely Brennon, PT, DPT   2017  [phone]

## 2017-08-21 NOTE — PLAN OF CARE
Problem: Patient Care Overview  Goal: Plan of Care Review  No acute events overnight. VSS. Pt remains on 2L nasal cannula SpO2 intermittently picks up and has been %. POCT glucoses montiored no insulin coverage needed. Gtts infusing: TPN at 50ml/hr, Dobutamine at 2.5mcg/kg/hr, Dopamine at 3mcg/kg/min, Protonix at 8mg/hr, and heparin at 400U/hr. CVP 10. Labs trended. See VAD flowsheets for details. See flowsheets for intake and output and assessment details Plan of care reviewed with patient and patients spouse. Will continue to monitor

## 2017-08-21 NOTE — ASSESSMENT & PLAN NOTE
INDIRA on CKD III  -INDIRA appears to be multifactorial, Ischemic ATN from renal hypoperfusion vs. Septic ATN.  -Baseline SCr appears to be around 1.2-1.3.  He has had multiple INDIRA's in the past, likely 2/2 to poor renal perfusion from cardiomyopathy.  -had initial insult on the day of LVAD surgery on the 27th with a spike in his SCr, but it quickly resolved and returned to baseline.  His SCr stayed stable until the 1st when it increased to 1.5 and has continued to trend up until 2.3 today.   -Signs of decreased perfusion occurred on July 31st with reported drops in LVAD Flows which resolved with decrease in lasix dose and 1 unit of PRBC infusion.   -Scr remains stable at 3.1, but having increases in BUN, up to 136 this morning.  He remains on Lasix, received dose this morning.  Had 3.8L UOP recorded yesterday, maintaining net volume status for the day.    -No overt signs of uremic symptoms on exam, but with rise in BUN, will provide 2 hour HD treatment today for metabolic clearance.  No UF.  -rise in BUN more related to pre-renal azotemia 2/2 to TPN and diuretics (CVP of 6 this morning).  -would recommend holding PM lasix dose this afternoon as he is having excellent UOP and maintaining net negative volume status.

## 2017-08-21 NOTE — PROGRESS NOTES
Progress Note  Surgical Intensive Care    Admit Date: 7/18/2017  Post-operative Day: 4 Days Post-Op  Hospital Day: 35    SUBJECTIVE:     Follow-up For:  Procedure(s) (LRB):  ESOPHAGOGASTRODUODENOSCOPY (EGD) (N/A)   S/p sternotomy closure 7/28/17    Interval history: No acute events overnight. VSS. On 2L NC. TPN for nutrition. Currently on Dobutatmine 2.5 and Dopamine 3.   Continuous Infusions:   DOBUTamine 5 mcg/kg/min (08/21/17 1100)    epinephrine infusion Stopped (08/15/17 1500)    heparin (porcine) in 5 % dex 400 Units/hr (08/21/17 1100)    pantoprazole 40 mg in dextrose 5 % 100 mL infusion (ready to mix system) 8 mg/hr (08/21/17 1100)    TPN ADULT CENTRAL LINE CUSTOM 50 mL/hr at 08/21/17 1100     Scheduled Meds:   ertapenem (INVANZ) IVPB  1 g Intravenous Q24H    furosemide  80 mg Intravenous BID    sodium chloride 0.9%  10 mL Intravenous Q6H    sodium chloride 0.9%  10 mL Intravenous Q6H    warfarin  3 mg Oral Once     PRN Meds:albumin human 5%, albuterol sulfate, bisacodyl, dextrose 50%, glucagon (human recombinant), insulin aspart, ondansetron, Flushing PICC Protocol **AND** sodium chloride 0.9% **AND** sodium chloride 0.9%, Flushing PICC Protocol **AND** sodium chloride 0.9% **AND** sodium chloride 0.9%    Review of patient's allergies indicates:  No Known Allergies    OBJECTIVE:     Vital Signs (Most Recent)  Temp: 98.8 °F (37.1 °C) (08/21/17 0700)  Pulse: 95 (08/21/17 0816)  Resp: 16 (08/21/17 0816)  BP: (!) 92/0 (08/21/17 0700)  SpO2: 100 % (08/21/17 0600)    Vital Signs Range (Last 24H):  Temp:  [98.2 °F (36.8 °C)-98.8 °F (37.1 °C)]   Pulse:  []   Resp:  [13-32]   BP: (84-92)/(0)   SpO2:  [100 %]   Arterial Line BP: ()/(55-72)     I & O (Last 24H):    Intake/Output Summary (Last 24 hours) at 08/21/17 1151  Last data filed at 08/21/17 1100   Gross per 24 hour   Intake          2180.56 ml   Output             4115 ml   Net         -1934.44 ml        Physical Exam     Constitutional: He appears well-developed and well-nourished. No distress. He is alert  HENT:   Head: Normocephalic and atraumatic.    Neck: Normal range of motion. Neck supple.   Cardiovascular: Intact distal pulses.    LVAD hum present.   Pulmonary/Chest: Effort normal. No respiratory distress..   Abdominal: Soft. He exhibits no distension.   Skin: Skin is warm and dry.     Laboratory (Last 24H):  CBC:    Recent Labs  Lab 08/21/17  0300   WBC 15.09*  15.09*   HGB 7.0*  7.0*   HCT 21.1*  21.1*     242     CMP:    Recent Labs  Lab 08/21/17  0300   CALCIUM 8.2*  8.2*   ALBUMIN 1.7*  1.7*   PROT 5.7*  5.7*     144   K 3.5  3.5   CO2 23  23     106   *  136*   CREATININE 3.1*  3.1*   ALKPHOS 257*  257*   ALT 60*  60*   AST 62*  62*   BILITOT 1.7*  1.7*     Echo  CONCLUSIONS     1 - Heartmate III LVAD; speed 5100, aortic valve opens intermittently, septum is midline.     2 - Severe left atrial enlargement.     3 - Mild left ventricular enlargement.     4 - Severely depressed left ventricular systolic function (EF 10-15%).     5 - Segmental wall motion abnormalities.     6 - Mildly depressed right ventricular systolic function .     7 - Mild tricuspid regurgitation.     ASSESSMENT/PLAN:     Neuro:  - alert and responding to commands  - sedation off    Resp:  - stable on 2L NC  - Possible aspiration event causing sepsis - resp cultures growing ESBL   - wean supplemental O2 as tolerated     CV:  - HDS; LVAD numbers stable  - Dobutatmine 2.5 and Dopamine 3., mgmt per CTS  - epi and levo off  - Hold  given GI bleed    Heme:  - Hgb/Hct 7/21.1  - Continue to trend labs  - INR down to 1.8  - Heparin 400u/hr    ID:  - afebrile  - WBC 15.09 stable  - Likely aspiration pneumonia   - bacteremic and resp cultures ESBL+, on ertapenem  - ID consulted  - blood cx 8/11 NGTD      Renal:  - Singer in place  - -325 cc/hr  - Strict I/Os   - 80mg Lasix BID per nephrology.  - BUN/Cr  trending up 136/3.1    FEN/GI:  - Protonix BID for possible GIB  - Replace lytes PRN     Endo:  - Endocrine following  - Accuchecks  - SSI     Dispo:  - Continue ICU care  - wean drips as tolerated per CTS    KEVIN Kumar, PGY1  General Surgery- SICU  08/21/2017

## 2017-08-21 NOTE — PROGRESS NOTES
Patient identified by 2 identifiers. Denies previous reactions to blood transfusions and allergies reviewed.  Procedure explained to patient & wife, consent obtained from wife.  IV in place to Rt neck, flushed w/ 10cc NS pre & post contrast administration.  3cc Optison administered, echo images obtained.  Pt tolerated procedure well.

## 2017-08-21 NOTE — PROGRESS NOTES
[facility]  Nephrology  Progress Note    Patient Name: Suman Hayden  MRN: [id number]  Admission Date: 7/18/2017  Hospital Length of Stay: 34 days  Attending Provider: Sunny Downing MD   Primary Care Physician: Joe Ernst MD  Principal Problem:Acute on chronic combined systolic and diastolic heart failure    Subjective:     HPI: Mr. Will is a 66 yo AAM with PMHx of NICM, HTN, HLD, and CKD III who presented to the hospital on 7/18 after syncopal episode at home.  He underwent LVAD placement on 7/27.  Labs that day showed a spike in his SCr to 1.5, but he quickly returned to baseline (1.2-1.3) and stayed stable until 08/01 when he increased to 1.5 and has steadily remained above baseline since, up to 2.3 on consult.  He has remained on lasix infusion with intermittent dosages of diuril to aid in diuresis.  On 08/3, there was a reported decrease in LVAD flows which resolved with decrease in lasix infusion and administration of 1 unit of PRBCs, likely causing decrease renal perfusion leading to an ischemic event.  He continues with adequate UOP on lasix gtt. He was transferred to the floor from ICU on 08/08 and was noted to become hypotensive with a doppler pressure of 58, since been requiring pressors for BP support.  Nephrology was consulted for INDIRA and decreased UOP.    Interval History:   No acute events overnight.  SCr remains stable, but BUN trending up to 136 this morning.  PM dose of lasix held yesterday, but received AM dose this morning.  Maintaining excellent UOP (3.8L).  No overt signs of uremia exist, slight confusion related to year.  CVP of 6.    Review of patient's allergies indicates:  No Known Allergies  Current Facility-Administered Medications   Medication Frequency    albumin human 5% bottle 500 mL PRN    albuterol nebulizer solution 2.5 mg Q4H PRN    bisacodyl suppository 10 mg Daily PRN    dextrose 50% injection 12.5 g PRN    DOBUtamine 1000 mg in D5W 250 mL infusion  (premix non-titrating) Continuous    EPINEPHrine (ADRENALIN) 4 mg in sodium chloride 0.9% 250 mL infusion Continuous    ertapenem (INVANZ) 1 g in sodium chloride 0.9 % 100 mL IVPB (ready to mix system) Q24H    furosemide injection 80 mg BID    glucagon (human recombinant) injection 1 mg PRN    heparin 25,000 units in dextrose 5% 250 mL (100 units/mL) infusion (heparin infusion) Continuous    insulin aspart pen 0-5 Units Q4H PRN    ondansetron injection 4 mg Q6H PRN    pantoprazole 40 mg in dextrose 5 % 100 mL infusion (ready to mix system) Continuous    sodium chloride 0.9% flush 10 mL Q6H    And    sodium chloride 0.9% flush 10 mL PRN    sodium chloride 0.9% flush 10 mL Q6H    And    sodium chloride 0.9% flush 10 mL PRN    TPN ADULT CENTRAL LINE CUSTOM Continuous    warfarin tablet 3 mg Once       Objective:     Vital Signs (Most Recent):  Temp: 98.8 °F (37.1 °C) (08/21/17 0700)  Pulse: 95 (08/21/17 0816)  Resp: 16 (08/21/17 0816)  BP: (!) 92/0 (08/21/17 0700)  SpO2: 100 % (08/21/17 0600)  O2 Device (Oxygen Therapy): nasal cannula (08/21/17 0816) Vital Signs (24h Range):  Temp:  [98.2 °F (36.8 °C)-98.8 °F (37.1 °C)] 98.8 °F (37.1 °C)  Pulse:  [] 95  Resp:  [13-32] 16  SpO2:  [100 %] 100 %  BP: (84-92)/(0) 92/0  Arterial Line BP: ()/(55-72) 102/69     Weight: 95.6 kg (210 lb 12.2 oz) (08/21/17 0451)  Body mass index is 32.05 kg/m².  Body surface area is 2.14 meters squared.    I/O last 3 completed shifts:  In: 3151.5 [I.V.:1348.9; NG/GT:45]  Out: 5786 [Urine:5585; Drains:200; Stool:1]    Physical Exam   Constitutional: He appears well-developed and well-nourished. No distress.   HENT:   Head: Normocephalic and atraumatic.   Eyes: EOM are normal.   Cardiovascular: Exam reveals no gallop and no friction rub.    No murmur heard.  Smooth LVAD hum   Pulmonary/Chest: Effort normal and breath sounds normal. No respiratory distress. He has no wheezes. He has no rales.   Abdominal: Soft. There is no  tenderness.   Musculoskeletal: He exhibits no edema.   Neurological: He is alert.   Oriented to Person/place.  Unable to tell year, but knows president.   Skin: Skin is warm and dry. No rash noted. He is not diaphoretic.   Vitals reviewed.      Significant Labs:  CBC:   Recent Labs  Lab 08/21/17  0300   WBC 15.09*  15.09*   RBC 2.53*  2.53*   HGB 7.0*  7.0*   HCT 21.1*  21.1*     242   MCV 83  83   MCH 27.7  27.7   MCHC 33.2  33.2     CMP:   Recent Labs  Lab 08/21/17  0300   *  124*   CALCIUM 8.2*  8.2*   ALBUMIN 1.7*  1.7*   PROT 5.7*  5.7*     144   K 3.5  3.5   CO2 23  23     106   *  136*   CREATININE 3.1*  3.1*   ALKPHOS 257*  257*   ALT 60*  60*   AST 62*  62*   BILITOT 1.7*  1.7*            Assessment/Plan:     Acute kidney injury superimposed on CKD    INDIRA on CKD III  -INDIRA appears to be multifactorial, Ischemic ATN from renal hypoperfusion vs. Septic ATN.  -Baseline SCr appears to be around 1.2-1.3.  He has had multiple INDIRA's in the past, likely 2/2 to poor renal perfusion from cardiomyopathy.  -had initial insult on the day of LVAD surgery on the 27th with a spike in his SCr, but it quickly resolved and returned to baseline.  His SCr stayed stable until the 1st when it increased to 1.5 and has continued to trend up until 2.3 today.   -Signs of decreased perfusion occurred on July 31st with reported drops in LVAD Flows which resolved with decrease in lasix dose and 1 unit of PRBC infusion.   -Scr remains stable at 3.1, but having increases in BUN, up to 136 this morning.  He remains on Lasix, received dose this morning.  Had 3.8L UOP recorded yesterday, maintaining net volume status for the day.    -No overt signs of uremic symptoms on exam, but with rise in BUN, will provide 2 hour HD treatment today for metabolic clearance.  No UF.  -rise in BUN more related to pre-renal azotemia 2/2 to TPN and diuretics (CVP of 6 this morning).  -would recommend  holding PM lasix dose this afternoon as he is having excellent UOP and maintaining net negative volume status.              Alfonzo Medina NP  Nephrology  [facility]  Pager:  [phone]

## 2017-08-21 NOTE — PROGRESS NOTES
[facility]  Heart Transplant  Progress Note    Patient Name: Suman Hayden  MRN: [id number]  Admission Date: 7/18/2017  Hospital Length of Stay: 34 days  Attending Physician: Sunny Downing MD  Primary Care Provider: Joe Ernst MD  Principal Problem:Acute on chronic combined systolic and diastolic heart failure    Subjective:     Interval History: Feeling OK this morning. Currently no complaints    Continuous Infusions:   DOBUTamine 5 mcg/kg/min (08/21/17 1400)    epinephrine infusion Stopped (08/15/17 1500)    heparin (porcine) in 5 % dex 400 Units/hr (08/21/17 1400)    pantoprazole 40 mg in dextrose 5 % 100 mL infusion (ready to mix system) 8 mg/hr (08/21/17 1400)    TPN ADULT CENTRAL LINE CUSTOM 50 mL/hr at 08/21/17 1400    TPN ADULT CENTRAL LINE CUSTOM       Scheduled Meds:   ertapenem (INVANZ) IVPB  1 g Intravenous Q24H    fat emulsion 20%  250 mL Intravenous Daily    sodium chloride 0.9%  10 mL Intravenous Q6H    sodium chloride 0.9%  10 mL Intravenous Q6H    warfarin  3 mg Oral Once     PRN Meds:albumin human 5%, albuterol sulfate, bisacodyl, dextrose 50%, glucagon (human recombinant), insulin aspart, ondansetron, Flushing PICC Protocol **AND** sodium chloride 0.9% **AND** sodium chloride 0.9%, Flushing PICC Protocol **AND** sodium chloride 0.9% **AND** sodium chloride 0.9%    Review of patient's allergies indicates:  No Known Allergies  Objective:     Vital Signs (Most Recent):  Temp: 98.8 °F (37.1 °C) (08/21/17 1100)  Pulse: 102 (08/21/17 1400)  Resp: (!) 22 (08/21/17 1400)  BP: (!) 92/0 (08/21/17 0700)  SpO2: 100 % (08/21/17 0600) Vital Signs (24h Range):  Temp:  [98.2 °F (36.8 °C)-98.8 °F (37.1 °C)] 98.8 °F (37.1 °C)  Pulse:  [] 102  Resp:  [13-26] 22  SpO2:  [100 %] 100 %  BP: (84-92)/(0) 92/0  Arterial Line BP: ()/(55-73) 103/60     Weight: 95.6 kg (210 lb 12.2 oz)  Body mass index is 32.05 kg/m².      Intake/Output Summary (Last 24 hours) at 08/21/17  1529  Last data filed at 08/21/17 1400   Gross per 24 hour   Intake          2180.56 ml   Output             3840 ml   Net         -1659.44 ml       Hemodynamic Parameters:           Physical Exam   Constitutional: He appears well-developed and well-nourished.   HENT:   Head: Normocephalic and atraumatic.   Eyes: EOM are normal. Pupils are equal, round, and reactive to light.   Cardiovascular: Normal rate, regular rhythm and normal heart sounds.  Exam reveals no gallop and no friction rub.    No murmur heard.  + LVAD hum    Pulmonary/Chest: Effort normal. No respiratory distress. He has no wheezes. He has no rales.   Abdominal: Soft. Bowel sounds are normal.   Musculoskeletal: He exhibits no edema.   Neurological: He is alert.   Nursing note and vitals reviewed.      Significant Labs:  CBC:    Recent Labs  Lab 08/21/17  1227   WBC 14.69*   RBC 2.40*   HGB 6.9*   HCT 20.1*      MCV 84   MCH 28.8   MCHC 34.3     BNP:    Recent Labs  Lab 08/21/17  0300   BNP 1,804*     CMP:    Recent Labs  Lab 08/21/17  1227   *   CALCIUM 8.0*   ALBUMIN 1.8*   PROT 5.9*      K 4.0   CO2 23      *   CREATININE 3.2*   ALKPHOS 245*   ALT 56*   AST 72*   BILITOT 1.6*      Coagulation:     Recent Labs  Lab 08/21/17  0300   INR 1.2   APTT 30.3     LDH:    Recent Labs  Lab 08/19/17  0355 08/20/17  0349 08/21/17  0300   * 373* 258     Microbiology:  Microbiology Results (last 7 days)     Procedure Component Value Units Date/Time    IV catheter culture [[id number]] Collected:  08/16/17 1730    Order Status:  Completed Specimen:  Catheter Tip from Catheter Tip, PICC Updated:  08/18/17 1055     Aerobic Culture - Cath tip No growth    Blood culture [[id number]] Collected:  08/11/17 1326    Order Status:  Completed Specimen:  Blood from Peripheral, Forearm, Left Updated:  08/16/17 1812     Blood Culture, Routine No growth after 5 days.          I have reviewed all pertinent labs within the past 24  hours.    Estimated Creatinine Clearance: 25.1 mL/min (based on Cr of 3.2).    Diagnostic Results:  I have reviewed and interpreted all pertinent imaging results/findings within the past 24 hours.    Assessment and Plan:     LVAD (left ventricular assist device) present    - LVAD HM III. Placed this admit 7/27/17  - CTS Primary  - chest closure (7/28/17) and extubated (7/29/17)  -Reintubated 8/9/17 and extubated 8/13/17  -Now on Dopamine,   - Speed 5200  - LDH stable  - AC per CTS                    Upper GI bleed    -GI following. EGD done. NG currently out will discuss with CTS replacing        Bacteremia    -ESBL from blood culture 8/9   -Ertapenem ( MERRITT < 0.5). ID following        Atrial fibrillation    -AC, Amio per C TS          Atrial tachycardia    - [id number] - 3  -Rec restarting Amio. AC per CTS        AICD discharge    - appropriate in the setting of VT aggravated by underlying AT/AFL (earlier during the admission)  - 7/28 - setting of AF undersense - device reprogrammed to VVI 80  - tachy therapy turned off  - Rec restarting Amio  - EP planning to do lead revision         Hepatitis B core antibody positive since 2012    - will defer liver biopsy as CTS not requiring it  - ID consult cleared the patient for sx  - case discussed with hepatology, low suspicion for liver involvement        V-tach    - Rec restarting Amio        Hyperlipidemia    - Rec resuming pravastatin once liver enzymes stabilize             Susannah Elizabeth PA-C  Heart Transplant  [facility]

## 2017-08-22 LAB
ALBUMIN SERPL BCP-MCNC: 1.7 G/DL
ALBUMIN SERPL BCP-MCNC: 1.9 G/DL
ALBUMIN SERPL BCP-MCNC: 1.9 G/DL
ALP SERPL-CCNC: 254 U/L
ALP SERPL-CCNC: 259 U/L
ALP SERPL-CCNC: 269 U/L
ALT SERPL W/O P-5'-P-CCNC: 42 U/L
ALT SERPL W/O P-5'-P-CCNC: 44 U/L
ALT SERPL W/O P-5'-P-CCNC: 47 U/L
ANION GAP SERPL CALC-SCNC: 11 MMOL/L
ANION GAP SERPL CALC-SCNC: 12 MMOL/L
ANISOCYTOSIS BLD QL SMEAR: SLIGHT
APTT BLDCRRT: 30.9 SEC
AST SERPL-CCNC: 48 U/L
AST SERPL-CCNC: 55 U/L
AST SERPL-CCNC: 58 U/L
BASOPHILS # BLD AUTO: 0.03 K/UL
BASOPHILS # BLD AUTO: 0.04 K/UL
BASOPHILS NFR BLD: 0.2 %
BASOPHILS NFR BLD: 0.3 %
BILIRUB SERPL-MCNC: 1.4 MG/DL
BILIRUB SERPL-MCNC: 1.5 MG/DL
BILIRUB SERPL-MCNC: 1.6 MG/DL
BLD PROD TYP BPU: NORMAL
BLOOD UNIT EXPIRATION DATE: NORMAL
BLOOD UNIT TYPE CODE: 5100
BLOOD UNIT TYPE: NORMAL
BUN SERPL-MCNC: 102 MG/DL
BUN SERPL-MCNC: 103 MG/DL
CALCIUM SERPL-MCNC: 7.7 MG/DL
CALCIUM SERPL-MCNC: 7.9 MG/DL
CALCIUM SERPL-MCNC: 8 MG/DL
CALCIUM SERPL-MCNC: 8 MG/DL
CHLORIDE SERPL-SCNC: 107 MMOL/L
CHLORIDE SERPL-SCNC: 107 MMOL/L
CHLORIDE SERPL-SCNC: 109 MMOL/L
CHLORIDE SERPL-SCNC: 109 MMOL/L
CO2 SERPL-SCNC: 22 MMOL/L
CO2 SERPL-SCNC: 23 MMOL/L
CODING SYSTEM: NORMAL
CREAT SERPL-MCNC: 2.4 MG/DL
DIFFERENTIAL METHOD: ABNORMAL
DISPENSE STATUS: NORMAL
EOSINOPHIL # BLD AUTO: 0.2 K/UL
EOSINOPHIL # BLD AUTO: 0.2 K/UL
EOSINOPHIL # BLD AUTO: 0.3 K/UL
EOSINOPHIL # BLD AUTO: 0.3 K/UL
EOSINOPHIL NFR BLD: 1.5 %
EOSINOPHIL NFR BLD: 1.5 %
EOSINOPHIL NFR BLD: 1.7 %
EOSINOPHIL NFR BLD: 1.7 %
ERYTHROCYTE [DISTWIDTH] IN BLOOD BY AUTOMATED COUNT: 17.2 %
ERYTHROCYTE [DISTWIDTH] IN BLOOD BY AUTOMATED COUNT: 18.1 %
ERYTHROCYTE [DISTWIDTH] IN BLOOD BY AUTOMATED COUNT: 18.1 %
ERYTHROCYTE [DISTWIDTH] IN BLOOD BY AUTOMATED COUNT: 18.4 %
EST. GFR  (AFRICAN AMERICAN): 31.1 ML/MIN/1.73 M^2
EST. GFR  (NON AFRICAN AMERICAN): 26.9 ML/MIN/1.73 M^2
GLUCOSE SERPL-MCNC: 101 MG/DL
GLUCOSE SERPL-MCNC: 104 MG/DL
GLUCOSE SERPL-MCNC: 109 MG/DL
GLUCOSE SERPL-MCNC: 109 MG/DL
HCT VFR BLD AUTO: 18.5 %
HCT VFR BLD AUTO: 18.5 %
HCT VFR BLD AUTO: 18.7 %
HCT VFR BLD AUTO: 22.2 %
HGB BLD-MCNC: 6.1 G/DL
HGB BLD-MCNC: 7.3 G/DL
INR PPP: 1.2
LDH SERPL L TO P-CCNC: 316 U/L
LYMPHOCYTES # BLD AUTO: 1.4 K/UL
LYMPHOCYTES # BLD AUTO: 1.5 K/UL
LYMPHOCYTES NFR BLD: 10.3 %
LYMPHOCYTES NFR BLD: 9.1 %
LYMPHOCYTES NFR BLD: 9.8 %
LYMPHOCYTES NFR BLD: 9.8 %
MAGNESIUM SERPL-MCNC: 1.8 MG/DL
MCH RBC QN AUTO: 27.7 PG
MCH RBC QN AUTO: 27.9 PG
MCH RBC QN AUTO: 27.9 PG
MCH RBC QN AUTO: 28.2 PG
MCHC RBC AUTO-ENTMCNC: 32.6 G/DL
MCHC RBC AUTO-ENTMCNC: 32.9 G/DL
MCHC RBC AUTO-ENTMCNC: 33 G/DL
MCHC RBC AUTO-ENTMCNC: 33 G/DL
MCV RBC AUTO: 85 FL
MCV RBC AUTO: 86 FL
MONOCYTES # BLD AUTO: 1 K/UL
MONOCYTES # BLD AUTO: 1.1 K/UL
MONOCYTES NFR BLD: 6.1 %
MONOCYTES NFR BLD: 6.1 %
MONOCYTES NFR BLD: 6.4 %
MONOCYTES NFR BLD: 7.5 %
NEUTROPHILS # BLD AUTO: 11.9 K/UL
NEUTROPHILS # BLD AUTO: 12.3 K/UL
NEUTROPHILS # BLD AUTO: 12.7 K/UL
NEUTROPHILS # BLD AUTO: 12.7 K/UL
NEUTROPHILS NFR BLD: 80.4 %
NEUTROPHILS NFR BLD: 82.2 %
NEUTROPHILS NFR BLD: 82.2 %
NEUTROPHILS NFR BLD: 82.8 %
PHOSPHATE SERPL-MCNC: 3.9 MG/DL
PLATELET # BLD AUTO: 235 K/UL
PLATELET # BLD AUTO: 242 K/UL
PLATELET # BLD AUTO: 242 K/UL
PLATELET # BLD AUTO: 245 K/UL
PMV BLD AUTO: 12.3 FL
PMV BLD AUTO: 13.1 FL
POCT GLUCOSE: 116 MG/DL (ref 70–110)
POCT GLUCOSE: 123 MG/DL (ref 70–110)
POCT GLUCOSE: 133 MG/DL (ref 70–110)
POCT GLUCOSE: 145 MG/DL (ref 70–110)
POIKILOCYTOSIS BLD QL SMEAR: SLIGHT
POLYCHROMASIA BLD QL SMEAR: ABNORMAL
POLYCHROMASIA BLD QL SMEAR: ABNORMAL
POTASSIUM SERPL-SCNC: 3.3 MMOL/L
POTASSIUM SERPL-SCNC: 3.4 MMOL/L
POTASSIUM SERPL-SCNC: 3.4 MMOL/L
POTASSIUM SERPL-SCNC: 3.6 MMOL/L
PROT SERPL-MCNC: 5.8 G/DL
PROT SERPL-MCNC: 5.9 G/DL
PROT SERPL-MCNC: 6 G/DL
PROTHROMBIN TIME: 12.8 SEC
RBC # BLD AUTO: 2.19 M/UL
RBC # BLD AUTO: 2.19 M/UL
RBC # BLD AUTO: 2.2 M/UL
RBC # BLD AUTO: 2.59 M/UL
SODIUM SERPL-SCNC: 142 MMOL/L
SODIUM SERPL-SCNC: 142 MMOL/L
SODIUM SERPL-SCNC: 143 MMOL/L
SODIUM SERPL-SCNC: 143 MMOL/L
TARGETS BLD QL SMEAR: ABNORMAL
TARGETS BLD QL SMEAR: ABNORMAL
TRANS ERYTHROCYTES VOL PATIENT: NORMAL ML
WBC # BLD AUTO: 14.9 K/UL
WBC # BLD AUTO: 14.99 K/UL
WBC # BLD AUTO: 15.65 K/UL
WBC # BLD AUTO: 15.65 K/UL

## 2017-08-22 PROCEDURE — 25000003 PHARM REV CODE 250: Performed by: STUDENT IN AN ORGANIZED HEALTH CARE EDUCATION/TRAINING PROGRAM

## 2017-08-22 PROCEDURE — 85025 COMPLETE CBC W/AUTO DIFF WBC: CPT

## 2017-08-22 PROCEDURE — 84100 ASSAY OF PHOSPHORUS: CPT

## 2017-08-22 PROCEDURE — 85610 PROTHROMBIN TIME: CPT

## 2017-08-22 PROCEDURE — 99232 SBSQ HOSP IP/OBS MODERATE 35: CPT | Mod: GC,,, | Performed by: ANESTHESIOLOGY

## 2017-08-22 PROCEDURE — 94761 N-INVAS EAR/PLS OXIMETRY MLT: CPT

## 2017-08-22 PROCEDURE — 97110 THERAPEUTIC EXERCISES: CPT

## 2017-08-22 PROCEDURE — 94799 UNLISTED PULMONARY SVC/PX: CPT

## 2017-08-22 PROCEDURE — 25000003 PHARM REV CODE 250: Performed by: INTERNAL MEDICINE

## 2017-08-22 PROCEDURE — 92526 ORAL FUNCTION THERAPY: CPT

## 2017-08-22 PROCEDURE — 99233 SBSQ HOSP IP/OBS HIGH 50: CPT | Mod: ,,, | Performed by: INTERNAL MEDICINE

## 2017-08-22 PROCEDURE — 85730 THROMBOPLASTIN TIME PARTIAL: CPT

## 2017-08-22 PROCEDURE — 99233 SBSQ HOSP IP/OBS HIGH 50: CPT | Mod: 24,,, | Performed by: THORACIC SURGERY (CARDIOTHORACIC VASCULAR SURGERY)

## 2017-08-22 PROCEDURE — 63600175 PHARM REV CODE 636 W HCPCS: Performed by: STUDENT IN AN ORGANIZED HEALTH CARE EDUCATION/TRAINING PROGRAM

## 2017-08-22 PROCEDURE — A4216 STERILE WATER/SALINE, 10 ML: HCPCS | Performed by: THORACIC SURGERY (CARDIOTHORACIC VASCULAR SURGERY)

## 2017-08-22 PROCEDURE — 27000248 HC VAD-ADDITIONAL DAY

## 2017-08-22 PROCEDURE — 99233 SBSQ HOSP IP/OBS HIGH 50: CPT | Mod: 25,,, | Performed by: INTERNAL MEDICINE

## 2017-08-22 PROCEDURE — 97803 MED NUTRITION INDIV SUBSEQ: CPT

## 2017-08-22 PROCEDURE — 93750 INTERROGATION VAD IN PERSON: CPT | Mod: ,,, | Performed by: THORACIC SURGERY (CARDIOTHORACIC VASCULAR SURGERY)

## 2017-08-22 PROCEDURE — 80053 COMPREHEN METABOLIC PANEL: CPT | Mod: 91

## 2017-08-22 PROCEDURE — 25000003 PHARM REV CODE 250: Performed by: THORACIC SURGERY (CARDIOTHORACIC VASCULAR SURGERY)

## 2017-08-22 PROCEDURE — P9021 RED BLOOD CELLS UNIT: HCPCS

## 2017-08-22 PROCEDURE — 93750 INTERROGATION VAD IN PERSON: CPT | Performed by: STUDENT IN AN ORGANIZED HEALTH CARE EDUCATION/TRAINING PROGRAM

## 2017-08-22 PROCEDURE — 27000221 HC OXYGEN, UP TO 24 HOURS

## 2017-08-22 PROCEDURE — B4185 PARENTERAL SOL 10 GM LIPIDS: HCPCS | Performed by: STUDENT IN AN ORGANIZED HEALTH CARE EDUCATION/TRAINING PROGRAM

## 2017-08-22 PROCEDURE — 63600175 PHARM REV CODE 636 W HCPCS: Performed by: THORACIC SURGERY (CARDIOTHORACIC VASCULAR SURGERY)

## 2017-08-22 PROCEDURE — 20000000 HC ICU ROOM

## 2017-08-22 PROCEDURE — 97530 THERAPEUTIC ACTIVITIES: CPT

## 2017-08-22 PROCEDURE — 83735 ASSAY OF MAGNESIUM: CPT

## 2017-08-22 PROCEDURE — C9113 INJ PANTOPRAZOLE SODIUM, VIA: HCPCS | Performed by: STUDENT IN AN ORGANIZED HEALTH CARE EDUCATION/TRAINING PROGRAM

## 2017-08-22 PROCEDURE — 63600175 PHARM REV CODE 636 W HCPCS: Performed by: INTERNAL MEDICINE

## 2017-08-22 PROCEDURE — 83615 LACTATE (LD) (LDH) ENZYME: CPT

## 2017-08-22 RX ORDER — HYDROCODONE BITARTRATE AND ACETAMINOPHEN 500; 5 MG/1; MG/1
TABLET ORAL
Status: DISCONTINUED | OUTPATIENT
Start: 2017-08-22 | End: 2017-08-28

## 2017-08-22 RX ORDER — FUROSEMIDE 10 MG/ML
40 INJECTION INTRAMUSCULAR; INTRAVENOUS ONCE AS NEEDED
Status: COMPLETED | OUTPATIENT
Start: 2017-08-22 | End: 2017-08-22

## 2017-08-22 RX ORDER — ACETAMINOPHEN 10 MG/ML
1000 INJECTION, SOLUTION INTRAVENOUS ONCE
Status: COMPLETED | OUTPATIENT
Start: 2017-08-22 | End: 2017-08-22

## 2017-08-22 RX ORDER — WARFARIN 2 MG/1
4 TABLET ORAL ONCE
Status: COMPLETED | OUTPATIENT
Start: 2017-08-22 | End: 2017-08-22

## 2017-08-22 RX ADMIN — FUROSEMIDE 40 MG: 10 INJECTION, SOLUTION INTRAVENOUS at 03:08

## 2017-08-22 RX ADMIN — PANTOPRAZOLE SODIUM 8 MG/HR: 40 INJECTION, POWDER, FOR SOLUTION INTRAVENOUS at 01:08

## 2017-08-22 RX ADMIN — HEPARIN SODIUM AND DEXTROSE 400 UNITS/HR: 10000; 5 INJECTION INTRAVENOUS at 02:08

## 2017-08-22 RX ADMIN — SODIUM CHLORIDE 0.5 G: 9 INJECTION, SOLUTION INTRAVENOUS at 09:08

## 2017-08-22 RX ADMIN — CALCIUM GLUCONATE: 94 INJECTION, SOLUTION INTRAVENOUS at 09:08

## 2017-08-22 RX ADMIN — PANTOPRAZOLE SODIUM 8 MG/HR: 40 INJECTION, POWDER, FOR SOLUTION INTRAVENOUS at 05:08

## 2017-08-22 RX ADMIN — DOBUTAMINE IN DEXTROSE 5 MCG/KG/MIN: 400 INJECTION, SOLUTION INTRAVENOUS at 12:08

## 2017-08-22 RX ADMIN — Medication 10 ML: at 12:08

## 2017-08-22 RX ADMIN — ACETAMINOPHEN 1000 MG: 10 INJECTION, SOLUTION INTRAVENOUS at 11:08

## 2017-08-22 RX ADMIN — PANTOPRAZOLE SODIUM 8 MG/HR: 40 INJECTION, POWDER, FOR SOLUTION INTRAVENOUS at 08:08

## 2017-08-22 RX ADMIN — SOYBEAN OIL 250 ML: 20 INJECTION, SOLUTION INTRAVENOUS at 09:08

## 2017-08-22 RX ADMIN — WARFARIN SODIUM 4 MG: 2 TABLET ORAL at 05:08

## 2017-08-22 RX ADMIN — Medication 10 ML: at 06:08

## 2017-08-22 NOTE — PT/OT/SLP PROGRESS
Physical Therapy  Co-treatment with  OT    Suman Hayden   MRN: [id number]   Admitting Diagnosis: LVAD (left ventricular assist device) present    PT Received On: 08/22/17  PT Start Time: 0826     PT Stop Time: 0854    PT Total Time (min): 28 min       Billable Minutes:  Therapeutic Exercise 28 min    Treatment Type: Other (see comments) (co-treatment)  PT/PTA: PT     PTA Visit Number: 0       General Precautions: Standard, LVAD, fall, sternal, contact, aspiration  Orthopedic Precautions: N/A   Braces:      Do you have any cultural, spiritual, Pentec[employer]al conflicts, given your current situation?: none noted     Subjective:  Communicated with nurse prior to session.      Pain/Comfort  Pain Rating 1: 3/10  Location - Side 1: Left  Location 1:  (arm)  Pain Rating Post-Intervention 1: 3/10    Objective:   Patient found with: telemetry, arterial line, pulse ox (continuous), delgado catheter, SCD, oxygen, PICC line (LVAD)    Functional Mobility:  Bed Mobility:   Rolling/Turning Right: Maximum assistance (pt needed verbal cues for functional mobility with sternal precaution.)  Supine to Sit: Maximum Assistance    Transfers:  Sit <> Stand Assistance: Total Assistance (max assist x 1 and min assist for additional person.)  Sit <> Stand Assistive Device: No Assistive Device  Bed <> Chair Technique: Stand Pivot  Bed <> Chair Assistance: Total Assistance (max assist x1 and min assist for 2nd person.)    Gait:   Gait Distance: not tested. pt was max assist SPT to chair.        Therapeutic Activities and Exercises:  Pt performed AAROM there exer BLE in supine and sitting x 15 reps.     AM-PAC 6 CLICK MOBILITY  How much help from another person does this patient currently need?   1 = Unable, Total/Dependent Assistance  2 = A lot, Maximum/Moderate Assistance  3 = A little, Minimum/Contact Guard/Supervision  4 = None, Modified [place]/Independent    Turning over in bed (including adjusting bedclothes, sheets and blankets)?: 2  Sitting  down on and standing up from a chair with arms (e.g., wheelchair, bedside commode, etc.): 2  Moving from lying on back to sitting on the side of the bed?: 2  Moving to and from a bed to a chair (including a wheelchair)?: 2  Need to walk in hospital room?: 1  Climbing 3-5 steps with a railing?: 1  Total Score: 10    AM-PAC Raw Score CMS G-Code Modifier Level of Impairment Assistance   6 % Total / Unable   7 - 9 CM 80 - 100% Maximal Assist   10 - 14 CL 60 - 80% Moderate Assist   15 - 19 CK 40 - 60% Moderate Assist   20 - 22 CJ 20 - 40% Minimal Assist   23 CI 1-20% SBA / CGA   24 CH 0% Independent/ Mod I     Patient left up in chair with all lines intact and call button in reach.    Assessment:  Suman Hayden is a 67 y.o. male with a medical diagnosis of LVAD (left ventricular assist device) present and presents with decreased strength, mobility, transfers and decreased distance ambulated. Pt would benefit from cont  PT to address deficits and will need inpt rehab placement. Pt will benefit from skilled PT 6x/wk to progress physically and return pt to highest functional level possible. Pt is s/p  LVAD HM3 placement 7/27, chest closure 7/28, re-intubation 8/9 and extubation 8/13/17.    Rehab identified problem list/impairments: Rehab identified problem list/impairments: weakness, impaired endurance, impaired functional mobilty, gait instability, impaired balance, decreased lower extremity function    Rehab potential is fair.    Activity tolerance: Fair    Discharge recommendations: Discharge Facility/Level Of Care Needs: rehabilitation facility     Barriers to discharge: Barriers to Discharge: Decreased caregiver support (wife will not be able to assist pt at current functional level. )    Equipment recommendations: Equipment Needed After Discharge:  (will determine DME needs closer to discharge.)     GOALS:    Physical Therapy Goals        Problem: Physical Therapy Goal    Goal Priority Disciplines Outcome Goal  Variances Interventions   Physical Therapy Goal     PT/OT, PT Ongoing (interventions implemented as appropriate)     Description:  Goals to be met by: 17     Patient will increase functional independence with mobility by performin. Supine to sit with MInimal Assistance- not met  2. Sit to supine with MInimal Assistance - not met  3. Sit to stand transfer with Minimal Assistance- not met  4. Bed to chair transfer with Minimal Assistance- not met  5. Gait  x 50 feet with Minimal Assistance. - not met  6. Lower extremity exercise program x15 reps, with supervision, in order to increase LE strength and (I) with functional mobility. - not met                        PLAN:    Patient to be seen 6 x/week  to address the above listed problems via gait training, therapeutic activities, therapeutic exercises  Plan of Care expires: 17  Plan of Care reviewed with: patient, spouse         Astridmichael Whaley, PT  2017

## 2017-08-22 NOTE — PT/OT/SLP PROGRESS
"Occupational Therapy  Treatment    Suman Hayden   MRN: [id number]   Admitting Diagnosis: LVAD (left ventricular assist device) present    OT Date of Treatment: 08/22/17   OT Start Time: 0914  OT Stop Time: 0940  OT Total Time (min): 26 min    Billable Minutes:  Therapeutic Activity 15 and Therapeutic Exercise 10    General Precautions: Standard, LVAD, fall, sternal, contact, aspiration  Orthopedic Precautions:    Braces:           Subjective:  Communicated with RN prior to session.  "Did you just come up with that?" (pt referring to ROM ex's he's been participating in with OT daily)    Pain/Comfort  Pain Rating 1: 3/10  Location - Side 1: Left  Location - Orientation 1: generalized  Location 1: arm  Pain Addressed 1: Reposition, Distraction, Nurse notified  Pain Rating Post-Intervention 1: 3/10  Pain Rating 2:  (denies chest pain)    Objective:  Patient found with: arterial line, central line, telemetry, PICC line, pulse ox (continuous), blood pressure cuff     Functional Mobility:  Bed Mobility:  Rolling/Turning Right: Maximum assistance  Scooting/Bridging: Total Assistance (to EOB)  Supine to Sit: Maximum Assistance    Transfers:   Sit <> Stand Assistance: Maximum Assistance  Sit <> Stand Assistive Device: No Assistive Device  Bed <> Chair Technique: Stand Pivot  Bed <> Chair Transfer Assistance: Total Assistance (Max A x 1; min A x second person for safety)  Bed <> Chair Assistive Device: No Assistive Device    Functional Ambulation: NT    Activities of Daily Living:  Feeding Level of Assistance: Activity did not occur  Feeding adaptive equipment:   UE Dressing Level of Assistance: Total assistance  UE adaptive equipment:   LE Dressing Level of Assistance: Total assistance  LE adaptive equipment:   Grooming Position: Seated  Grooming Level of Assistance: Total assistance ([place] A for washing face)       Therapeutic Activities and Exercises:  Pt more alert and interactive this session. Pt agreeable to tx. Pt " "demonstrated increased active participation in bed mobility and sat EOB with CGA<>Minimal A while performing edema management ex's with L UE. Pt stood with max A and was able to advance LE during pivot to chair with max A (second person available for safety). Pt performed R UE AROM ex's and L UE AAROM ex's in all planes. L UE elevated on double pillows and pt ed on continued edema management ex's including hand pumps    AM-PAC 6 CLICK ADL   How much help from another person does this patient currently need?   1 = Unable, Total/Dependent Assistance  2 = A lot, Maximum/Moderate Assistance  3 = A little, Minimum/Contact Guard/Supervision  4 = None, Modified [place]/Independent    Putting on and taking off regular lower body clothing? : 1  Bathing (including washing, rinsing, drying)?: 1  Toileting, which includes using toilet, bedpan, or urinal? : 1  Putting on and taking off regular upper body clothing?: 1  Taking care of personal grooming such as brushing teeth?: 1  Eating meals?: 1  Total Score: 6     AM-PAC Raw Score CMS "G-Code Modifier Level of Impairment Assistance   6 % Total / Unable   7 - 8 CM 80 - 100% Maximal Assist   9-13 CL 60 - 80% Moderate Assist   14 - 19 CK 40 - 60% Moderate Assist   20 - 22 CJ 20 - 40% Minimal Assist   23 CI 1-20% SBA / CGA   24 CH 0% Independent/ Mod I       Patient left up in chair with all lines intact, call button in reach, rn notified and spouse present    ASSESSMENT:  Suman Hayden is a 67 y.o. male with a medical diagnosis of LVAD (left ventricular assist device) present and presents with weakness and decreased endurance affecting ADL (I).    Rehab identified problem list/impairments: Rehab identified problem list/impairments: impaired endurance, impaired self care skills, gait instability, impaired functional mobilty, weakness, impaired balance, decreased upper extremity function, impaired cardiopulmonary response to activity, edema    Rehab potential is " good.    Activity tolerance: Good    Discharge recommendations: Discharge Facility/Level Of Care Needs: rehabilitation facility     Barriers to discharge: Barriers to Discharge: Decreased caregiver support (at current functional level)    Equipment recommendations:  (TBD closer to d/c)     GOALS:    Occupational Therapy Goals        Problem: Occupational Therapy Goal    Goal Priority Disciplines Outcome Interventions   Occupational Therapy Goal     OT, PT/OT Ongoing (interventions implemented as appropriate)    Description:  Goals to be met by:  2 weeks 8/28/17    Patient will increase functional independence with ADLs by performing:  Feeding: Independent   UE Dressing with Supervision.  LE Dressing with Supervision.  Grooming while standing with Supervision.  Toileting from toilet with Supervision for hygiene and clothing management.   Stand pivot transfers with Supervision.  Toilet transfer to toilet with Supervision.  Pt will be supervision  with LVAD yasmin't.                  Multidisciplinary Problems (Resolved)        Problem: Occupational Therapy Goal    Goal Priority Disciplines Outcome Interventions   Occupational Therapy Goal   (Resolved)     OT, PT/OT  Error    Description:  Goals to be met by: 8/04/2017    Patient will increase functional independence with ADLs by performing:    Increased functional strength to 5/5 for improved ADL performance.  Upper extremity exercise program and R LE ankle pumps  3x 10 reps per handout, with independence.                      Plan:  Patient to be seen 6 x/week to address the above listed problems via self-care/home management, therapeutic activities, therapeutic exercises  Plan of Care expires: 08/29/17  Plan of Care reviewed with: patient, spouse         Sammi DELMY Malloy  08/22/2017

## 2017-08-22 NOTE — PT/OT/SLP PROGRESS
"Speech Language Pathology  Treatment    Suman Hayden   MRN: [id number]   [id number] A     Admitting Diagnosis: LVAD (left ventricular assist device) present    Diet recommendations:   Solid Diet Level: Other (see comments) (No solids assessed per pt refusal)    Liquid Diet Level: Full liquids   ·  Feed only when awake/alert  · HOB to 90 degrees    SLP Treatment Date: 08/22/17  Speech Start Time: 0951     Speech Stop Time: 1005     Speech Total (min): 14 min       TREATMENT BILLABLE MINUTES:  Treatment Swallowing Dysfunction 14    Has the patient been evaluated by SLP for swallowing? : Yes  Keep patient NPO?: No   General Precautions: Standard, aspiration, NPO, LVAD, fall, sternal, contact  Current Respiratory Status: nasal cannula       Subjective:  "I don't want any. I'll get to it at some time." Pt commented regarding solid food trials    Pain/Comfort  Pain Rating 1: 5/10  Location - Side 1: Left  Location 1: abdomen  Pain Addressed 1: Nurse notified, Distraction    Objective:   Patient found with: blood pressure cuff, oxygen, telemetry, pulse ox (continuous)  Pt seen sitting upright in bedside chair. Pt assessed with thin liquids via cyclical cup sips and cyclical straw sips; and sucking on a hard candy with soft center. Oral phase wfl. Pharyngeal phase c/b no overt s/s of aspiration. Pt refused all solid food trials. Unable to recommend solid food diet at this time.   Will continue to follow    Assessment:  Suman Hayden is a 67 y.o. male with a medical diagnosis of LVAD (left ventricular assist device) present and presents with limited acceptance of po trials with no overt s/s of aspiration with thin liquids.    Discharge recommendations: Discharge Facility/Level Of Care Needs: rehabilitation facility     Goals:    SLP Goals        Problem: SLP Goal    Goal Priority Disciplines Outcome   SLP Goal     SLP Ongoing (interventions implemented as appropriate)   Description:  Speech Language Pathology Goals  Goals expected " to be met by 8/28  1. Pt will tolerate thin liquids with no overt s/s of aspiration.   2. Pt will tolerate trials of soft solids with adequate a-p transit and no overt s/s of aspiration                   Multidisciplinary Problems (Resolved)        Problem: SLP Goal    Goal Priority Disciplines Outcome   SLP Goal   (Resolved)     SLP Outcome(s) achieved   Description:  Speech Language Pathology Goals  Goals expected to be met by 8/4:  1. Pt will tolerate a dental soft diet and thin liquids without s/s of aspiration.                          Plan:   Patient to be seen Therapy Frequency: 5 x/week   Plan of Care expires: 09/12/17  Plan of Care reviewed with: patient, spouse  SLP Follow-up?: Yes          ,SHERRI Hart, CCC-SLP, CLC  Speech Language Pathologist  Certified Lactation Counselor  [phone]  8/22/2017

## 2017-08-22 NOTE — PLAN OF CARE
Problem: Patient Care Overview  Goal: Plan of Care Review  No acute events overnight. VSS. Pt remains on room air. SpO2 intermittently picks up and has been %. POCT glucoses montiored no insulin coverage needed. Pt received HD for 2 hours tolerated fine. Gtts infusing: TPN at 50ml/hr, lipids 20.8ml/hr, Dobutamine at 5mcg/kg/hr, Protonix at 8mg/hr, and heparin at 400U/hr. CVP 6. Labs trended. See VAD flowsheets for details. See flowsheets for intake and output and assessment details Plan of care reviewed with patient and patients spouse. Will continue to monitor

## 2017-08-22 NOTE — PROGRESS NOTES
[facility]  Adult Nutrition  Progress Note    SUMMARY     Recommendations    Recommendation/Intervention:   1. Cont w/ current TPN provision until able to either adv diet or enteral access is gained    2. Pt now w/ functioning gut. If unable to adv diet, consider placing Beny for TF initiation. If TF initiation is desired, rec Novasource Renal w/ a goal rate of 45ml/hr = 2160 cals, 98 gms prot, & 774 mls free fl    Goals: Meet >/=85% of EEN/EPN  Nutrition Goal Status: goal met  Communication of RD Recs: reviewed with RN    Reason for Assessment    Reason for Assessment: RD follow-up  Diagnosis:  (s/p LVAD)  Relevent Medical History: NICM, HTN, HLD   Interdisciplinary Rounds: attended     General Information Comments: Pt on NC; ST following pt & rec'ing clear, thin liquids. Had HD yest. Remains on TPN. Finally having BM's    Nutrition Discharge Planning: Unable to determine    Nutrition Prescription Ordered    Current Diet Order: NPO  Nutrition Order Comments: -  Current Nutrition Support Formula Ordered: Other (Comment) (Custom TPN: 8.5% amino acid/20% dextrose)  Current Nutrition Support Rate Ordered: 50 (ml)  Current Nutrition Support Frequency Ordered: mL/hr  Oral Nutrition Supplement: -     Evaluation of Received Nutrients/Fluid Intake                Parenteral Calories (kcal): 1224  Parenteral Protein (gm): 102  Parenteral Fluid (mL): 1200           Other Calories (kcal): 0  Total Calories (kcal): 1724     Energy Calories Required: meeting needs  % Kcal Needs: 89              Protein Required: meeting needs  % Protein Needs: 100     IV Fluid (mL):  (IVPB meds)     GIR (Glucose Infusion Rate) (mg/kg/min): 2.09 mg/kg/min  Lipid Calories (kcals): 500 kcals (via 250 mls 20% intralipid)  I/O: -738mls x 24 hrs, LBM 8/22         Fluid Required: other (see comments) (per MD)  Comments: NG removed & pt jesse'ing well.   Tolerance: tolerating  % Intake of Estimated Energy Needs: 75 - 100 %  % Meal  "Intake: NPO     Nutrition Risk Screen     Nutrition Risk Screen: no indicators present    Nutrition/Diet History    Patient Reported Diet/Restrictions/Preferences: general, low salt  Typical Food/Fluid Intake: TPN/NPO currently  Food Preferences: MARIE        Factors Affecting Nutritional Intake: NPO, difficulty/impaired swallowing                Labs/Tests/Procedures/Meds       Pertinent Labs Reviewed: reviewed  Pertinent Labs Comments: K 3.4, , Crt 2.4, POCT 116, Alb 1.9, tbili 1.5  Pertinent Medications Reviewed: reviewed  Pertinent Medications Comments: dobutamine, pantoprazole, warfarin    Physical Findings    Overall Physical Appearance: on oxygen therapy, overweight  Tubes: other (see comments) (none)  Oral/Mouth Cavity: tooth/teeth missing  Skin: incision (chest incision)    Anthropometrics    Temp: 99.1 °F (37.3 °C)     Height: 5' 8" (172.7 cm)  Weight Method: Bed Scale  Weight: 95.6 kg (210 lb 12.2 oz)  Ideal Body Weight (IBW), Male: 154 lb     % Ideal Body Weight, Male (lb): 136.86 lb     BMI (Calculated): 32.1  BMI Grade: 30 - 34.9- obesity - grade I     Usual Body Weight (UBW), k.8 kg (admit wt)     % Usual Body Weight: 115.78  % Weight Change From Usual Weight: -15.78 %             Estimated/Assessed Needs    Weight Used For Calorie Calculations: 79.9 kg (176 lb 2.4 oz) (dosing wt)      Energy Calorie Requirements (kcal):   Energy Need Method: Mercer-St Jeor (x 1.25 (PAL))        RMR (Mercer-St. Jeor Equation): 1548.5        Weight Used For Protein Calculations: 79.9 kg (176 lb 2.4 oz) (dosing wt)  Protein Requirements:  gms (1.2-1.4 gms/kg dosing wt)       Fluid Need Method: RDA Method, other (see comments) (Per MD or 1 mL/kcal)        RDA Method (mL):                Assessment and Plan    Nutrition Problem  Inadequate energy intake     Related to (etiology):   suboptimal PN provision w/ no lipids     Signs and Symptoms (as evidenced by):   <85% of EEN being " met     Interventions/Recommendations (treatment strategy):  See recs     Nutrition Diagnosis Status:   Resolved    Monitor and Evaluation    Food and Nutrient Intake: parenteral nutrition intake, energy intake  Food and Nutrient Adminstration: diet order, enteral and parenteral nutrition administration     Physical Activity and Function: nutrition-related ADLs and IADLs  Anthropometric Measurements: weight, weight change, body mass index  Biochemical Data, Medical Tests and Procedures: gastrointestinal profile, electrolyte and renal panel, glucose/endocrine profile, lipid profile, inflammatory profile  Nutrition-Focused Physical Findings: overall appearance    Nutrition Risk    Level of Risk: other (see comments) (2x/week)    Nutrition Follow-Up    RD Follow-up?: Yes

## 2017-08-22 NOTE — PROGRESS NOTES
[facility]  Nephrology  Progress Note    Patient Name: Suman Hayden  MRN: [id number]  Admission Date: 7/18/2017  Hospital Length of Stay: 35 days  Attending Provider: Sunny Downing MD   Primary Care Physician: Joe Ernst MD  Principal Problem:LVAD (left ventricular assist device) present    Subjective:     HPI: Mr. Will is a 66 yo AAM with PMHx of NICM, HTN, HLD, and CKD III who presented to the hospital on 7/18 after syncopal episode at home.  He underwent LVAD placement on 7/27.  Labs that day showed a spike in his SCr to 1.5, but he quickly returned to baseline (1.2-1.3) and stayed stable until 08/01 when he increased to 1.5 and has steadily remained above baseline since, up to 2.3 on consult.  He has remained on lasix infusion with intermittent dosages of diuril to aid in diuresis.  On 08/3, there was a reported decrease in LVAD flows which resolved with decrease in lasix infusion and administration of 1 unit of PRBCs, likely causing decrease renal perfusion leading to an ischemic event.  He continues with adequate UOP on lasix gtt. He was transferred to the floor from ICU on 08/08 and was noted to become hypotensive with a doppler pressure of 58, since been requiring pressors for BP support.  Nephrology was consulted for INDIRA and decreased UOP.    Interval History:   Tolerated 2 hour HD treatment yesterday for elevated BUN.  He appears more alert this morning, sitting in bedside chair watching tv.  Maintains excellent UOP (2.8L) and net negative volume status.  H/H trending down, primary team planning on PRBC transfusion today.      Review of patient's allergies indicates:  No Known Allergies  Current Facility-Administered Medications   Medication Frequency    albumin human 5% bottle 500 mL PRN    albuterol nebulizer solution 2.5 mg Q4H PRN    bisacodyl suppository 10 mg Daily PRN    dextrose 50% injection 12.5 g PRN    DOBUtamine 1000 mg in D5W 250 mL infusion (premix non-titrating)  Continuous    EPINEPHrine (ADRENALIN) 4 mg in sodium chloride 0.9% 250 mL infusion Continuous    ertapenem (INVANZ) 0.5 g in sodium chloride 0.9% 50 mL IVPB Q24H    fat emulsion 20% infusion 250 mL Daily    glucagon (human recombinant) injection 1 mg PRN    heparin 25,000 units in dextrose 5% 250 mL (100 units/mL) infusion (heparin infusion) Continuous    insulin aspart pen 0-5 Units Q4H PRN    ondansetron injection 4 mg Q6H PRN    pantoprazole 40 mg in dextrose 5 % 100 mL infusion (ready to mix system) Continuous    sodium chloride 0.9% flush 10 mL Q6H    And    sodium chloride 0.9% flush 10 mL PRN    sodium chloride 0.9% flush 10 mL Q6H    And    sodium chloride 0.9% flush 10 mL PRN    TPN ADULT CENTRAL LINE CUSTOM Continuous    TPN ADULT CENTRAL LINE CUSTOM Continuous    warfarin (COUMADIN) tablet 4 mg Once    warfarin tablet 3 mg Once       Objective:     Vital Signs (Most Recent):  Temp: 99.1 °F (37.3 °C) (08/22/17 1100)  Pulse: 80 (08/22/17 1300)  Resp: 20 (08/22/17 1300)  BP: (!) 78/0 (08/22/17 1100)  SpO2: 100 % (08/22/17 1300)  O2 Device (Oxygen Therapy): nasal cannula (08/22/17 1300) Vital Signs (24h Range):  Temp:  [98.5 °F (36.9 °C)-99.1 °F (37.3 °C)] 99.1 °F (37.3 °C)  Pulse:  [] 80  Resp:  [19-31] 20  SpO2:  [95 %-100 %] 100 %  BP: (78-98)/(0) 78/0  Arterial Line BP: ()/(57-75) 91/62     Weight: 95.6 kg (210 lb 12.2 oz) (08/21/17 0451)  Body mass index is 32.05 kg/m².  Body surface area is 2.14 meters squared.    I/O last 3 completed shifts:  In: 4051.6 [I.V.:1554.6; Other:700]  Out: 5825 [Urine:5125; Other:700]    Physical Exam   Constitutional: He is oriented to person, place, and time. He appears well-developed and well-nourished. No distress.   HENT:   Head: Normocephalic and atraumatic.   Eyes: EOM are normal.   Cardiovascular: Exam reveals no gallop and no friction rub.    No murmur heard.  Smooth LVAD hum   Pulmonary/Chest: Effort normal and breath sounds normal. No  respiratory distress. He has no wheezes. He has no rales.   Abdominal: Soft. There is no tenderness.   Musculoskeletal: He exhibits no edema.   Neurological: He is alert and oriented to person, place, and time.   Skin: Skin is warm and dry. No rash noted. He is not diaphoretic.   Vitals reviewed.      Significant Labs:  CBC:   Recent Labs  Lab 08/22/17  0405   WBC 15.65*  15.65*   RBC 2.19*  2.19*   HGB 6.1*  6.1*   HCT 18.5*  18.5*     242   MCV 85  85   MCH 27.9  27.9   MCHC 33.0  33.0     CMP:   Recent Labs  Lab 08/22/17  0405     109   CALCIUM 8.0*  8.0*   ALBUMIN 1.9*   PROT 5.8*     142   K 3.4*  3.4*   CO2 23  23     107   *  102*   CREATININE 2.4*  2.4*   ALKPHOS 269*   ALT 47*   AST 58*   BILITOT 1.5*          Assessment/Plan:     Acute kidney injury superimposed on CKD    INDIRA on CKD III  -INDIRA appears to be multifactorial, Ischemic ATN from renal hypoperfusion vs. Septic ATN.  -Baseline SCr appears to be around 1.2-1.3.  He has had multiple INDIRA's in the past, likely 2/2 to poor renal perfusion from cardiomyopathy.  -had initial insult on the day of LVAD surgery on the 27th with a spike in his SCr, but it quickly resolved and returned to baseline.  His SCr stayed stable until the 1st when it increased to 1.5 and has continued to trend up until 2.3 today.   -Signs of decreased perfusion occurred on July 31st with reported drops in LVAD Flows which resolved with decrease in lasix dose and 1 unit of PRBC infusion.   -received one dose of lasix yesterday, excellent UOP, maintains net negative volume status.    -improved clearance with dialysis, but with increase in BUN this morning.  Nursing staff reported large dark tarry stool this morning.  -No overt signs of uremic symptoms on exam  -rise in BUN more related to pre-renal azotemia 2/2 to TPN (protein content decreased by dietician today) and likely GI bleed.    -Primary team planning on Blood transfusion today.   Would recommend Lasix dose between units of blood.  Would not place on standing lasix at this time.  -No need for HD today.             Alfonzo Medina NP  Nephrology  [facility]  Pager:  [phone]

## 2017-08-22 NOTE — SUBJECTIVE & OBJECTIVE
Interval History: Still in bed and appears tired this morning. Currently no complaints. Says he had a dark BM earlier this morning    Continuous Infusions:   DOBUTamine 5 mcg/kg/min (08/22/17 1500)    epinephrine infusion Stopped (08/15/17 1500)    heparin (porcine) in 5 % dex 400 Units/hr (08/22/17 1500)    pantoprazole 40 mg in dextrose 5 % 100 mL infusion (ready to mix system) 8 mg/hr (08/22/17 1500)    TPN ADULT CENTRAL LINE CUSTOM 50 mL/hr at 08/22/17 1500    TPN ADULT CENTRAL LINE CUSTOM       Scheduled Meds:   ertapenem (INVANZ) IVPB  0.5 g Intravenous Q24H    fat emulsion 20%  250 mL Intravenous Daily    sodium chloride 0.9%  10 mL Intravenous Q6H    sodium chloride 0.9%  10 mL Intravenous Q6H    warfarin  4 mg Oral Once    warfarin  3 mg Oral Once     PRN Meds:sodium chloride, albumin human 5%, albuterol sulfate, bisacodyl, dextrose 50%, glucagon (human recombinant), insulin aspart, ondansetron, Flushing PICC Protocol **AND** sodium chloride 0.9% **AND** sodium chloride 0.9%, Flushing PICC Protocol **AND** sodium chloride 0.9% **AND** sodium chloride 0.9%    Review of patient's allergies indicates:  No Known Allergies  Objective:     Vital Signs (Most Recent):  Temp: 98.7 °F (37.1 °C) (08/22/17 1515)  Pulse: 102 (08/22/17 1515)  Resp: (!) 25 (08/22/17 1515)  BP: (!) 80/0 (08/22/17 1400)  SpO2: 100 % (08/22/17 1515) Vital Signs (24h Range):  Temp:  [98.5 °F (36.9 °C)-99.1 °F (37.3 °C)] 98.7 °F (37.1 °C)  Pulse:  [] 102  Resp:  [19-33] 25  SpO2:  [95 %-100 %] 100 %  BP: (78-98)/(0) 80/0  Arterial Line BP: ()/(57-75) 116/73     Weight: 95.6 kg (210 lb 12.2 oz)  Body mass index is 32.05 kg/m².      Intake/Output Summary (Last 24 hours) at 08/22/17 1536  Last data filed at 08/22/17 1500   Gross per 24 hour   Intake             2882 ml   Output             2915 ml   Net              -33 ml       Hemodynamic Parameters:           Physical Exam   Constitutional: He appears well-developed and  well-nourished.   HENT:   Head: Normocephalic and atraumatic.   Eyes: EOM are normal. Pupils are equal, round, and reactive to light.   Cardiovascular: Normal rate, regular rhythm and normal heart sounds.  Exam reveals no gallop and no friction rub.    No murmur heard.  + LVAD hum    Pulmonary/Chest: Effort normal. No respiratory distress. He has no wheezes. He has no rales.   Abdominal: Soft. Bowel sounds are normal.   Musculoskeletal: He exhibits no edema.   Neurological: He is alert.   Nursing note and vitals reviewed.      Significant Labs:  CBC:    Recent Labs  Lab 08/22/17  0405 08/22/17  1237   WBC 15.65*  15.65*  --    RBC 2.19*  2.19* 2.20*   HGB 6.1*  6.1* 6.1*   HCT 18.5*  18.5* 18.7*     242  --    MCV 85  85 85   MCH 27.9  27.9 27.7   MCHC 33.0  33.0 32.6     BNP:    Recent Labs  Lab 08/21/17  0300   BNP 1,804*     CMP:    Recent Labs  Lab 08/22/17  1237      CALCIUM 7.9*   ALBUMIN 1.7*   PROT 5.9*      K 3.6   CO2 22*      *   CREATININE 2.4*   ALKPHOS 254*   ALT 44   AST 55*   BILITOT 1.4*      Coagulation:     Recent Labs  Lab 08/22/17  0405   INR 1.2   APTT 30.9     LDH:    Recent Labs  Lab 08/20/17  0349 08/21/17  0300 08/22/17  0405   * 258 316*     Microbiology:  Microbiology Results (last 7 days)     Procedure Component Value Units Date/Time    IV catheter culture [[id number]] Collected:  08/16/17 1730    Order Status:  Completed Specimen:  Catheter Tip from Catheter Tip, PICC Updated:  08/18/17 1055     Aerobic Culture - Cath tip No growth    Blood culture [[id number]] Collected:  08/11/17 1326    Order Status:  Completed Specimen:  Blood from Peripheral, Forearm, Left Updated:  08/16/17 1812     Blood Culture, Routine No growth after 5 days.          I have reviewed all pertinent labs within the past 24 hours.    Estimated Creatinine Clearance: 33.5 mL/min (based on Cr of 2.4).    Diagnostic Results:  I have reviewed and interpreted all pertinent  imaging results/findings within the past 24 hours.

## 2017-08-22 NOTE — ASSESSMENT & PLAN NOTE
- LVAD HM III. Placed this admit 7/27/17  - CTS Primary  - chest closure (7/28/17) and extubated (7/29/17)  -Reintubated 8/9/17 and extubated 8/13/17  -Now on    - Speed 5200  - LDH stable  - AC per CTS

## 2017-08-22 NOTE — PROCEDURES
Patient aao x3 with sitting up in chair with wife at bedside. VAD interrogation completed this AM in the event changes needed to be made. Will continue to monitor for further issues.     Pulsatile: Yes, intermittent   VAD Sounds: HM3  Smooth  Problems / Issues / Alarms with VAD if any: None noted  HCT: 19, 20 in monitor as this is lowest setting       VAD Interrogation:  TXP LVAD INTERROGATIONS 8/22/2017 8/22/2017 8/22/2017 8/22/2017 8/22/2017 8/22/2017 8/22/2017   Type HeartMate3 HeartMate3 HeartMate3 HeartMate3 HeartMate3 HeartMate3 HeartMate3   Flow 4.1 3.9 4.1 3.9 4.1 4.0 3.9   Speed 5200 5200 5200 5200 5200 5200 5200   PI 3.6 3.7 3.6 3.8 3.6 3.6 3.7   Power ([place]) 3.5 3.5 3.6 3.5 3.5 3.5 3.5   LSL 4800 4800 4800 4800 4800 4800 4800   Pulsatility - - - - - - -   Some recent data might be hidden   }

## 2017-08-22 NOTE — PLAN OF CARE
Problem: SLP Goal  Goal: SLP Goal  Speech Language Pathology Goals  Goals expected to be met by 8/28  1. Pt will tolerate thin liquids with no overt s/s of aspiration.   2. Pt will tolerate trials of soft solids with adequate a-p transit and no overt s/s of aspiration        Outcome: Ongoing (interventions implemented as appropriate)  Continue current plan of care. Goals remain appropriate. SHERRI Lofton, CCC-SLP, [facility] 8/22/2017

## 2017-08-22 NOTE — PLAN OF CARE
Problem: Occupational Therapy Goal  Goal: Occupational Therapy Goal  Goals to be met by:  2 weeks 8/28/17    Patient will increase functional independence with ADLs by performing:  Feeding: Independent   UE Dressing with Supervision.  LE Dressing with Supervision.  Grooming while standing with Supervision.  Toileting from toilet with Supervision for hygiene and clothing management.   Stand pivot transfers with Supervision.  Toilet transfer to toilet with Supervision.  Pt will be supervision  with LVAD yasmin't.       Outcome: Ongoing (interventions implemented as appropriate)  The above goals remain appropriate. DELMY Lucero  8/22/2017

## 2017-08-22 NOTE — ASSESSMENT & PLAN NOTE
INDIRA on CKD III  -INDIRA appears to be multifactorial, Ischemic ATN from renal hypoperfusion vs. Septic ATN.  -Baseline SCr appears to be around 1.2-1.3.  He has had multiple INDIRA's in the past, likely 2/2 to poor renal perfusion from cardiomyopathy.  -had initial insult on the day of LVAD surgery on the 27th with a spike in his SCr, but it quickly resolved and returned to baseline.  His SCr stayed stable until the 1st when it increased to 1.5 and has continued to trend up until 2.3 today.   -Signs of decreased perfusion occurred on July 31st with reported drops in LVAD Flows which resolved with decrease in lasix dose and 1 unit of PRBC infusion.   -received one dose of lasix yesterday, excellent UOP, maintains net negative volume status.    -improved clearance with dialysis, but with increase in BUN this morning.  Nursing staff reported large dark tarry stool this morning.  -No overt signs of uremic symptoms on exam  -rise in BUN more related to pre-renal azotemia 2/2 to TPN (protein content decreased by dietician today) and likely GI bleed.    -Primary team planning on Blood transfusion today.  Would recommend Lasix dose between units of blood.  Would not place on standing lasix at this time.  -No need for HD today.

## 2017-08-22 NOTE — PROGRESS NOTES
Daily E and M and VAD Interrogation Note    Reason for Visit:  Patient is seen in follow up for management of:  [] HeartMate II  [] Heartware [] Total artificial heart       [] ECMO           [x] Other - HM III     Interval History:  [] Interval history unobtainable due to intubation.  The [x] implant/[] explant date was 7/27/17    Events -  NAEON.  Had HD.  Multiple BM yesterday, none bloody.  Worked with PT in bed yesterday       Review of Systems:   Negative except as above.      Medications:  Current Facility-Administered Medications   Medication Dose Route Frequency Provider Last Rate Last Dose    albumin human 5% bottle 500 mL  500 mL Intravenous PRN Shayne Espinoza MD   500 mL at 08/09/17 0700    albuterol nebulizer solution 2.5 mg  2.5 mg Nebulization Q4H PRN Shayne Espinoza MD        bisacodyl suppository 10 mg  10 mg Rectal Daily PRN Shayne Espinoza MD   10 mg at 08/06/17 2036    dextrose 50% injection 12.5 g  12.5 g Intravenous PRN Charbel Ritter MD        DOBUtamine 1000 mg in D5W 250 mL infusion (premix non-titrating)  5 mcg/kg/min (Dosing Weight) Intravenous Continuous Sunny Dowinng MD 6 mL/hr at 08/22/17 0900 5 mcg/kg/min at 08/22/17 0900    EPINEPHrine (ADRENALIN) 4 mg in sodium chloride 0.9% 250 mL infusion  0.01 mcg/kg/min (Dosing Weight) Intravenous Continuous Shayne Espinoza MD   Stopped at 08/15/17 1500    ertapenem (INVANZ) 1 g in sodium chloride 0.9 % 100 mL IVPB (ready to mix system)  1 g Intravenous Q24H Edwige Clay  mL/hr at 08/21/17 2133 1 g at 08/21/17 2133    fat emulsion 20% infusion 250 mL  250 mL Intravenous Daily Andres Uriarte MD 20.8 mL/hr at 08/21/17 2142 250 mL at 08/21/17 2142    glucagon (human recombinant) injection 1 mg  1 mg Intramuscular PRN Charbel Ritter MD        heparin 25,000 units in dextrose 5% 250 mL (100 units/mL) infusion (heparin infusion)  400 Units/hr Intravenous Continuous Sunny Downing MD 4 mL/hr at  08/22/17 0900 400 Units/hr at 08/22/17 0900    insulin aspart pen 0-5 Units  0-5 Units Subcutaneous Q4H PRN Charbel Ritter MD   2 Units at 08/10/17 0751    ondansetron injection 4 mg  4 mg Intravenous Q6H PRN Shayne Espinoza MD   4 mg at 08/08/17 2112    pantoprazole 40 mg in dextrose 5 % 100 mL infusion (ready to mix system)  8 mg/hr Intravenous Continuous Shayne Espinoza MD 20 mL/hr at 08/22/17 0900 8 mg/hr at 08/22/17 0900    sodium chloride 0.9% flush 10 mL  10 mL Intravenous Q6H Sunny Downing MD   10 mL at 08/20/17 1300    And    sodium chloride 0.9% flush 10 mL  10 mL Intravenous PRN Sunny Downing MD   10 mL at 08/10/17 1151    sodium chloride 0.9% flush 10 mL  10 mL Intravenous Q6H Sunny Downing MD   10 mL at 08/20/17 1300    And    sodium chloride 0.9% flush 10 mL  10 mL Intravenous PRN Sunny Downing MD        TPN ADULT CENTRAL LINE CUSTOM   Intravenous Continuous Andres Uriarte MD 50 mL/hr at 08/22/17 0900      warfarin (COUMADIN) tablet 4 mg  4 mg Oral Once Andres Uriarte MD        warfarin tablet 3 mg  3 mg Oral Once Sharlene Tran MD         Physical Examination:  Vital Signs:   Vitals:    08/22/17 0845   BP:    Pulse: 80   Resp: (!) 23   Temp:      Cardiovascular:  [x] Regular rate and rhythm [] Irregular []  None (MATT) []  Other  []  No edema [x]  Edema present  [x]  Clear to auscultation  []  Rales to []  Coarse  []  No rales but   [] Pedal Pulses absent  [x]  Pulses  + throughout  Skin:  Incision is [x]  Clean, dry and intact.  []  Other   Sternum:  [x]  Stable []  Unstable  Driveline(s):   [x]  Clean, dry and intact. []  Other     Labs:  BMP  Lab Results   Component Value Date     08/22/2017     08/22/2017    K 3.4 (L) 08/22/2017    K 3.4 (L) 08/22/2017     08/22/2017     08/22/2017    CO2 23 08/22/2017    CO2 23 08/22/2017     (H) 08/22/2017     (H) 08/22/2017    CREATININE 2.4 (H) 08/22/2017    CREATININE 2.4 (H)  08/22/2017    CALCIUM 8.0 (L) 08/22/2017    CALCIUM 8.0 (L) 08/22/2017    ANIONGAP 12 08/22/2017    ANIONGAP 12 08/22/2017    ESTGFRAFRICA 31.1 (A) 08/22/2017    ESTGFRAFRICA 31.1 (A) 08/22/2017    EGFRNONAA 26.9 (A) 08/22/2017    EGFRNONAA 26.9 (A) 08/22/2017     Magnesium   Date Value Ref Range Status   08/22/2017 1.8 1.6 - 2.6 mg/dL Final     Lab Results   Component Value Date    WBC 15.65 (H) 08/22/2017    WBC 15.65 (H) 08/22/2017    HGB 6.1 (L) 08/22/2017    HGB 6.1 (L) 08/22/2017    HCT 18.5 (LL) 08/22/2017    HCT 18.5 (LL) 08/22/2017    MCV 85 08/22/2017    MCV 85 08/22/2017     08/22/2017     08/22/2017     Lab Results   Component Value Date    INR 1.2 08/22/2017    INR 1.2 08/21/2017    INR 1.2 08/20/2017     BNP   Date Value Ref Range Status   08/21/2017 1,804 (H) 0 - 99 pg/mL Final     Comment:     Values of less than 100 pg/ml are consistent with non-CHF populations.   08/18/2017 2,538 (H) 0 - 99 pg/mL Final     Comment:     Values of less than 100 pg/ml are consistent with non-CHF populations.   08/16/2017 2,542 (H) 0 - 99 pg/mL Final     Comment:     Values of less than 100 pg/ml are consistent with non-CHF populations.     LD   Date Value Ref Range Status   08/22/2017 316 (H) 110 - 260 U/L Final     Comment:     Results are increased in hemolyzed samples.   08/21/2017 258 110 - 260 U/L Final     Comment:     Results are increased in hemolyzed samples.   08/20/2017 373 (H) 110 - 260 U/L Final     Comment:     Results are increased in hemolyzed samples.     X-Rays:  [x]  I reviewed today's Chest x-ray    Procedure:  Device Interrogation including analysis of device parameters.  Current Settings   [x]  Ventricular Assist Device  []  Total Artificial Heart interrogated  Review of device function is [x]  Stable []  Other   TXP LVAD INTERROGATIONS 8/22/2017 8/22/2017 8/22/2017 8/22/2017 8/22/2017 8/22/2017 8/22/2017   Type HeartMate3 HeartMate3 HeartMate3 HeartMate3 HeartMate3 HeartMate3  HeartMate3   Flow 4.0 3.9 4.0 4.0 4.1 4.1 4.2   Speed 5200 5200 5200 5200 5200 5200 5200   PI 3.6 3.7 3.7 3.5 3.7 3.6 3.4   Power ([place]) 3.5 3.5 3.6 3.6 3.6 3.5 3.6   LSL 4800 4800 4800 4800 4800 4800 4800   Pulsatility - - - - - - No Pulse   Some recent data might be hidden       Assessment:  [x]  Primary Cardiomyopathy [x]  Congestive Heart Failure   []  Atrial Fibrillation []  Ventricular Tachycardia   [x]  Aftercare cardiac device [x]  Long term (current) use of anticoagulants   []  Ventilator-associated pneumonia []  Pneumonia viral, unspecified   []  Pneumonia, bacterial, unspecified []  Pneumonia, organism unspecified   [x]  Hemorrhage of GI tract, unspecified    []  Nosebleed []  Driveline infection   []  Infection VAD device []  New onset of    []        Plan:  [x]  Interval history obtained from ICU attending team member during rounding today  [x]  VAD/MATT teaching performed with patient  [x]  Mobilization / Physical Therapy ongoing  [x]  Anticoagulation [x]  Ongoing []  Held  []  Studies ordered  []   To OR today for closure.       Total time spent was 30 minutes.  Of which more than 50 percent of the care dominated counseling and coordinating care with different team members. The VAD was interrogated and all parameters were WNL and no significant findings were found in the history. All these findings are documented in the note above.    Neuro:  - Alert and oriented    Resp:  - Extubated  - Resp cultures with ESBL - Ertapenem per ID   - Minimize supplemental O2  - Pulmonary toilet    CV:  - HDS; LVAD numbers stable  - LDH from 316  - Dobutamine at 2.5  - Dopamine at 3  - Vaso and epi weaned off  - Hold  given GI bleed  - LVAD numbers stable      Heme:  - Hgb/Hct trending down, will give blood with HD today   - Continue to trend  - INR 1.2 this AM   - PICC in place  - Holding ASA 2/2 bleeding  - Coumadin - 4mg   - Heparin - PTT 40-50 today    ID:  - afebrile  - Ertapenem started for ESBL pos resp  culture  - WBC 15.09  - PICC replaced 8/17  - Appreciate ID recs  - continue to monitor     Renal:  - Singer in place  - Strict I/Os   - 2.8L UOP  - Lasix gtt off  -  this AM   - Getting HD for clearance   - Nephro is following.      FEN/GI:  - Can have very small amount of liquids  - Protonix gtt,  - Replace lytes PRN  - Continue TPN    Endo:  - Endocrine following, appreciate assistance  - Accuchecks  - Insulin ssi    Dispo:  - Continue ICU care.     Date of service:  08/22/2017     Andres Uriarte MD       Cardiac Surgery Attending E and M (VAD) Note along with VAD Interrogation    I have seen and examined the patient and agree with the findings above    I also reviewed the patients clinical course and:  [x]  Hemodynamic & Respiratory paramters  [x]  Laboratory Data  [x]  Radiological studies     VAD Interrogated [x]      VAD Function is normal. Changes made []  None [x]        Interrogation of Ventricular assist device was performed with physician analysis of device parameters and review of device function. I have personally reviewed the interrogation findings and agree with findings as stated

## 2017-08-22 NOTE — SUBJECTIVE & OBJECTIVE
Interval History:   Tolerated 2 hour HD treatment yesterday for elevated BUN.  He appears more alert this morning, sitting in bedside chair watching tv.  Maintains excellent UOP (2.8L) and net negative volume status.  H/H trending down, primary team planning on PRBC transfusion today.      Review of patient's allergies indicates:  No Known Allergies  Current Facility-Administered Medications   Medication Frequency    albumin human 5% bottle 500 mL PRN    albuterol nebulizer solution 2.5 mg Q4H PRN    bisacodyl suppository 10 mg Daily PRN    dextrose 50% injection 12.5 g PRN    DOBUtamine 1000 mg in D5W 250 mL infusion (premix non-titrating) Continuous    EPINEPHrine (ADRENALIN) 4 mg in sodium chloride 0.9% 250 mL infusion Continuous    ertapenem (INVANZ) 0.5 g in sodium chloride 0.9% 50 mL IVPB Q24H    fat emulsion 20% infusion 250 mL Daily    glucagon (human recombinant) injection 1 mg PRN    heparin 25,000 units in dextrose 5% 250 mL (100 units/mL) infusion (heparin infusion) Continuous    insulin aspart pen 0-5 Units Q4H PRN    ondansetron injection 4 mg Q6H PRN    pantoprazole 40 mg in dextrose 5 % 100 mL infusion (ready to mix system) Continuous    sodium chloride 0.9% flush 10 mL Q6H    And    sodium chloride 0.9% flush 10 mL PRN    sodium chloride 0.9% flush 10 mL Q6H    And    sodium chloride 0.9% flush 10 mL PRN    TPN ADULT CENTRAL LINE CUSTOM Continuous    TPN ADULT CENTRAL LINE CUSTOM Continuous    warfarin (COUMADIN) tablet 4 mg Once    warfarin tablet 3 mg Once       Objective:     Vital Signs (Most Recent):  Temp: 99.1 °F (37.3 °C) (08/22/17 1100)  Pulse: 80 (08/22/17 1300)  Resp: 20 (08/22/17 1300)  BP: (!) 78/0 (08/22/17 1100)  SpO2: 100 % (08/22/17 1300)  O2 Device (Oxygen Therapy): nasal cannula (08/22/17 1300) Vital Signs (24h Range):  Temp:  [98.5 °F (36.9 °C)-99.1 °F (37.3 °C)] 99.1 °F (37.3 °C)  Pulse:  [] 80  Resp:  [19-31] 20  SpO2:  [95 %-100 %] 100 %  BP:  (78-98)/(0) 78/0  Arterial Line BP: ()/(57-75) 91/62     Weight: 95.6 kg (210 lb 12.2 oz) (08/21/17 0451)  Body mass index is 32.05 kg/m².  Body surface area is 2.14 meters squared.    I/O last 3 completed shifts:  In: 4051.6 [I.V.:1554.6; Other:700]  Out: 5825 [Urine:5125; Other:700]    Physical Exam   Constitutional: He is oriented to person, place, and time. He appears well-developed and well-nourished. No distress.   HENT:   Head: Normocephalic and atraumatic.   Eyes: EOM are normal.   Cardiovascular: Exam reveals no gallop and no friction rub.    No murmur heard.  Smooth LVAD hum   Pulmonary/Chest: Effort normal and breath sounds normal. No respiratory distress. He has no wheezes. He has no rales.   Abdominal: Soft. There is no tenderness.   Musculoskeletal: He exhibits no edema.   Neurological: He is alert and oriented to person, place, and time.   Skin: Skin is warm and dry. No rash noted. He is not diaphoretic.   Vitals reviewed.      Significant Labs:  CBC:   Recent Labs  Lab 08/22/17  0405   WBC 15.65*  15.65*   RBC 2.19*  2.19*   HGB 6.1*  6.1*   HCT 18.5*  18.5*     242   MCV 85  85   MCH 27.9  27.9   MCHC 33.0  33.0     CMP:   Recent Labs  Lab 08/22/17  0405     109   CALCIUM 8.0*  8.0*   ALBUMIN 1.9*   PROT 5.8*     142   K 3.4*  3.4*   CO2 23  23     107   *  102*   CREATININE 2.4*  2.4*   ALKPHOS 269*   ALT 47*   AST 58*   BILITOT 1.5*

## 2017-08-22 NOTE — PROGRESS NOTES
[facility]  Heart Transplant  Progress Note    Patient Name: Suman Hayden  MRN: [id number]  Admission Date: 7/18/2017  Hospital Length of Stay: 35 days  Attending Physician: Sunny Downing MD  Primary Care Provider: Joe Ernst MD  Principal Problem:LVAD (left ventricular assist device) present    Subjective:     Interval History: Still in bed and appears tired this morning. Currently no complaints. Says he had a dark BM earlier this morning    Continuous Infusions:   DOBUTamine 5 mcg/kg/min (08/22/17 1500)    epinephrine infusion Stopped (08/15/17 1500)    heparin (porcine) in 5 % dex 400 Units/hr (08/22/17 1500)    pantoprazole 40 mg in dextrose 5 % 100 mL infusion (ready to mix system) 8 mg/hr (08/22/17 1500)    TPN ADULT CENTRAL LINE CUSTOM 50 mL/hr at 08/22/17 1500    TPN ADULT CENTRAL LINE CUSTOM       Scheduled Meds:   ertapenem (INVANZ) IVPB  0.5 g Intravenous Q24H    fat emulsion 20%  250 mL Intravenous Daily    sodium chloride 0.9%  10 mL Intravenous Q6H    sodium chloride 0.9%  10 mL Intravenous Q6H    warfarin  4 mg Oral Once    warfarin  3 mg Oral Once     PRN Meds:sodium chloride, albumin human 5%, albuterol sulfate, bisacodyl, dextrose 50%, glucagon (human recombinant), insulin aspart, ondansetron, Flushing PICC Protocol **AND** sodium chloride 0.9% **AND** sodium chloride 0.9%, Flushing PICC Protocol **AND** sodium chloride 0.9% **AND** sodium chloride 0.9%    Review of patient's allergies indicates:  No Known Allergies  Objective:     Vital Signs (Most Recent):  Temp: 98.7 °F (37.1 °C) (08/22/17 1515)  Pulse: 102 (08/22/17 1515)  Resp: (!) 25 (08/22/17 1515)  BP: (!) 80/0 (08/22/17 1400)  SpO2: 100 % (08/22/17 1515) Vital Signs (24h Range):  Temp:  [98.5 °F (36.9 °C)-99.1 °F (37.3 °C)] 98.7 °F (37.1 °C)  Pulse:  [] 102  Resp:  [19-33] 25  SpO2:  [95 %-100 %] 100 %  BP: (78-98)/(0) 80/0  Arterial Line BP: ()/(57-75) 116/73     Weight: 95.6 kg (210 lb 12.2  oz)  Body mass index is 32.05 kg/m².      Intake/Output Summary (Last 24 hours) at 08/22/17 1536  Last data filed at 08/22/17 1500   Gross per 24 hour   Intake             2882 ml   Output             2915 ml   Net              -33 ml       Hemodynamic Parameters:           Physical Exam   Constitutional: He appears well-developed and well-nourished.   HENT:   Head: Normocephalic and atraumatic.   Eyes: EOM are normal. Pupils are equal, round, and reactive to light.   Cardiovascular: Normal rate, regular rhythm and normal heart sounds.  Exam reveals no gallop and no friction rub.    No murmur heard.  + LVAD hum    Pulmonary/Chest: Effort normal. No respiratory distress. He has no wheezes. He has no rales.   Abdominal: Soft. Bowel sounds are normal.   Musculoskeletal: He exhibits no edema.   Neurological: He is alert.   Nursing note and vitals reviewed.      Significant Labs:  CBC:    Recent Labs  Lab 08/22/17  0405 08/22/17  1237   WBC 15.65*  15.65*  --    RBC 2.19*  2.19* 2.20*   HGB 6.1*  6.1* 6.1*   HCT 18.5*  18.5* 18.7*     242  --    MCV 85  85 85   MCH 27.9  27.9 27.7   MCHC 33.0  33.0 32.6     BNP:    Recent Labs  Lab 08/21/17  0300   BNP 1,804*     CMP:    Recent Labs  Lab 08/22/17  1237      CALCIUM 7.9*   ALBUMIN 1.7*   PROT 5.9*      K 3.6   CO2 22*      *   CREATININE 2.4*   ALKPHOS 254*   ALT 44   AST 55*   BILITOT 1.4*      Coagulation:     Recent Labs  Lab 08/22/17  0405   INR 1.2   APTT 30.9     LDH:    Recent Labs  Lab 08/20/17  0349 08/21/17  0300 08/22/17  0405   * 258 316*     Microbiology:  Microbiology Results (last 7 days)     Procedure Component Value Units Date/Time    IV catheter culture [[id number]] Collected:  08/16/17 1730    Order Status:  Completed Specimen:  Catheter Tip from Catheter Tip, PICC Updated:  08/18/17 1055     Aerobic Culture - Cath tip No growth    Blood culture [[id number]] Collected:  08/11/17 1326    Order Status:   Completed Specimen:  Blood from Peripheral, Forearm, Left Updated:  08/16/17 1812     Blood Culture, Routine No growth after 5 days.          I have reviewed all pertinent labs within the past 24 hours.    Estimated Creatinine Clearance: 33.5 mL/min (based on Cr of 2.4).    Diagnostic Results:  I have reviewed and interpreted all pertinent imaging results/findings within the past 24 hours.    Assessment and Plan:     * LVAD (left ventricular assist device) present    - LVAD HM III. Placed this admit 7/27/17  - CTS Primary  - chest closure (7/28/17) and extubated (7/29/17)  -Reintubated 8/9/17 and extubated 8/13/17  -Now on    - Speed 5200  - LDH stable  - AC per CTS                    Upper GI bleed    -GI following. EGD done.         Bacteremia    -ESBL from blood culture 8/9   -Ertapenem, ID following        Atrial fibrillation    -AC, Amio per C TS          Atrial tachycardia    - [id number] - 3  -Rec restarting Amio. AC per CTS        AICD discharge    - appropriate in the setting of VT aggravated by underlying AT/AFL (earlier during the admission)  - 7/28 - setting of AF undersense - device reprogrammed to VVI 80  - tachy therapy turned off  - Rec restarting Amio  - EP planning to do lead revision         Hepatitis B core antibody positive since 2012    - will defer liver biopsy as CTS not requiring it  - ID consult cleared the patient for sx  - case discussed with hepatology, low suspicion for liver involvement        V-tach    - Rec restarting Amio        Hyperlipidemia    - Rec resuming pravastatin once liver enzymes stabilize             Susannah Elizabeth PA-C  Heart Transplant  [facility]

## 2017-08-22 NOTE — PROGRESS NOTES
Progress Note  Surgical Intensive Care    Admit Date: 7/18/2017  Post-operative Day: 5 Days Post-Op  Hospital Day: 36    SUBJECTIVE:     Follow-up For:  Procedure(s) (LRB):  ESOPHAGOGASTRODUODENOSCOPY (EGD) (N/A)   S/p sternotomy closure 7/28/17    Interval history: No acute events overnight. VSS. On 2L NC. TPN for nutrition. Currently on Dobutatmine 5     Scheduled Meds:   acetaminophen  1,000 mg Intravenous Once    ertapenem (INVANZ) IVPB  1 g Intravenous Q24H    fat emulsion 20%  250 mL Intravenous Daily    sodium chloride 0.9%  10 mL Intravenous Q6H    sodium chloride 0.9%  10 mL Intravenous Q6H    warfarin  4 mg Oral Once    warfarin  3 mg Oral Once     Continuous Infusions:   DOBUTamine 5 mcg/kg/min (08/22/17 1000)    epinephrine infusion Stopped (08/15/17 1500)    heparin (porcine) in 5 % dex 400 Units/hr (08/22/17 1000)    pantoprazole 40 mg in dextrose 5 % 100 mL infusion (ready to mix system) 8 mg/hr (08/22/17 1000)    TPN ADULT CENTRAL LINE CUSTOM 50 mL/hr at 08/22/17 1000    TPN ADULT CENTRAL LINE CUSTOM       PRN Meds:.albumin human 5%, albuterol sulfate, bisacodyl, dextrose 50%, glucagon (human recombinant), insulin aspart, ondansetron, Flushing PICC Protocol **AND** sodium chloride 0.9% **AND** sodium chloride 0.9%, Flushing PICC Protocol **AND** sodium chloride 0.9% **AND** sodium chloride 0.9%  Review of patient's allergies indicates:  No Known Allergies    OBJECTIVE:     Vital Signs (Most Recent)  Temp: 98.5 °F (36.9 °C) (08/22/17 0700)  Pulse: 80 (08/22/17 1030)  Resp: (!) 22 (08/22/17 1030)  BP: (!) 92/0 (08/22/17 0700)  SpO2: 100 % (08/22/17 0745)    Vital Signs Range (Last 24H):  Temp:  [98.5 °F (36.9 °C)-98.8 °F (37.1 °C)]   Pulse:  []   Resp:  [19-31]   BP: (92-98)/(0)   SpO2:  [95 %-100 %]   Arterial Line BP: ()/(57-75)     I & O (Last 24H):    Intake/Output Summary (Last 24 hours) at 08/22/17 1051  Last data filed at 08/22/17 0900   Gross per 24 hour   Intake              2882 ml   Output             3280 ml   Net             -398 ml        Physical Exam    Constitutional: He appears well-developed and well-nourished. No distress. He is alert  HENT:   Head: Normocephalic and atraumatic.    Neck: Normal range of motion. Neck supple.   Cardiovascular: Intact distal pulses.    LVAD hum present.   Pulmonary/Chest: Effort normal. No respiratory distress..   Abdominal: Soft. He exhibits no distension.   Skin: Skin is warm and dry.     Laboratory (Last 24H):  CBC:    Recent Labs  Lab 08/22/17  0405   WBC 15.65*  15.65*   HGB 6.1*  6.1*   HCT 18.5*  18.5*     242     CMP:    Recent Labs  Lab 08/22/17  0405   CALCIUM 8.0*  8.0*   ALBUMIN 1.9*   PROT 5.8*     142   K 3.4*  3.4*   CO2 23  23     107   *  102*   CREATININE 2.4*  2.4*   ALKPHOS 269*   ALT 47*   AST 58*   BILITOT 1.5*     Echo  CONCLUSIONS     1 - Heartmate III LVAD; speed 5100, aortic valve opens intermittently, septum is midline.     2 - Severe left atrial enlargement.     3 - Mild left ventricular enlargement.     4 - Severely depressed left ventricular systolic function (EF 10-15%).     5 - Segmental wall motion abnormalities.     6 - Mildly depressed right ventricular systolic function .     7 - Mild tricuspid regurgitation.     ASSESSMENT/PLAN:     Neuro:  - alert and responding to commands  - sedation off    Resp:  - stable on 2L NC  - Possible aspiration event causing sepsis - resp cultures growing ESBL   - wean supplemental O2 as tolerated     CV:  - HDS; LVAD numbers stable  - Dobutatmine 5., mgmt per CTS  - epi and levo off  - Hold  given GI bleed    Heme:  - Hgb/Hct trended down to 6.1/10.5  - Continue to trend labs  - INR down to 1.8  - Heparin 400u/hr    ID:  - afebrile  - WBC 18.68  - Likely aspiration pneumonia   - bacteremic and resp cultures ESBL+, on ertapenem  - ID consulted  - blood cx 8/11 NGTD      Renal:  - Singer in place  - -325 cc/hr  - Strict  I/Os   - 80mg Lasix BID per nephrology.  - BUN/Cr trending down 102/2.4  - Tolerated HD well    FEN/GI:  - Currently on Protonix drip; Consider transitioning to BID  - Replace lytes PRN     Endo:  - Endocrine following  - Accuchecks  - SSI     Dispo:  - Continue ICU care  - wean drips as tolerated per CTS    Christian Melissa MD, PGY-3  Surgery Intensive Care Unit

## 2017-08-23 ENCOUNTER — RESEARCH ENCOUNTER (OUTPATIENT)
Dept: RESEARCH | Facility: HOSPITAL | Age: 67
End: 2017-08-23

## 2017-08-23 PROBLEM — E87.8 ELECTROLYTE ABNORMALITY: Status: ACTIVE | Noted: 2017-08-23

## 2017-08-23 LAB
ABO + RH BLD: NORMAL
ALBUMIN SERPL BCP-MCNC: 1.8 G/DL
ALBUMIN SERPL BCP-MCNC: 1.8 G/DL
ALBUMIN SERPL BCP-MCNC: 1.9 G/DL
ALBUMIN SERPL BCP-MCNC: 2 G/DL
ALP SERPL-CCNC: 233 U/L
ALP SERPL-CCNC: 239 U/L
ALP SERPL-CCNC: 239 U/L
ALP SERPL-CCNC: 240 U/L
ALT SERPL W/O P-5'-P-CCNC: 33 U/L
ALT SERPL W/O P-5'-P-CCNC: 36 U/L
ALT SERPL W/O P-5'-P-CCNC: 37 U/L
ALT SERPL W/O P-5'-P-CCNC: 39 U/L
ANION GAP SERPL CALC-SCNC: 11 MMOL/L
ANION GAP SERPL CALC-SCNC: 15 MMOL/L
ANION GAP SERPL CALC-SCNC: 15 MMOL/L
ANION GAP SERPL CALC-SCNC: 9 MMOL/L
ANISOCYTOSIS BLD QL SMEAR: SLIGHT
ANISOCYTOSIS BLD QL SMEAR: SLIGHT
APTT BLDCRRT: 29.2 SEC
AST SERPL-CCNC: 36 U/L
AST SERPL-CCNC: 40 U/L
AST SERPL-CCNC: 40 U/L
AST SERPL-CCNC: 43 U/L
BASO STIPL BLD QL SMEAR: ABNORMAL
BASOPHILS # BLD AUTO: 0.02 K/UL
BASOPHILS # BLD AUTO: 0.02 K/UL
BASOPHILS # BLD AUTO: 0.03 K/UL
BASOPHILS # BLD AUTO: 0.03 K/UL
BASOPHILS NFR BLD: 0.1 %
BASOPHILS NFR BLD: 0.1 %
BASOPHILS NFR BLD: 0.2 %
BASOPHILS NFR BLD: 0.2 %
BILIRUB DIRECT SERPL-MCNC: 1.1 MG/DL
BILIRUB SERPL-MCNC: 1.5 MG/DL
BILIRUB SERPL-MCNC: 1.6 MG/DL
BLD GP AB SCN CELLS X3 SERPL QL: NORMAL
BNP SERPL-MCNC: 3515 PG/ML
BUN SERPL-MCNC: 101 MG/DL
BUN SERPL-MCNC: 101 MG/DL
BUN SERPL-MCNC: 103 MG/DL
BUN SERPL-MCNC: 106 MG/DL
CALCIUM SERPL-MCNC: 8 MG/DL
CALCIUM SERPL-MCNC: 8 MG/DL
CALCIUM SERPL-MCNC: 8.4 MG/DL
CALCIUM SERPL-MCNC: 8.5 MG/DL
CHLORIDE SERPL-SCNC: 105 MMOL/L
CHLORIDE SERPL-SCNC: 106 MMOL/L
CHLORIDE SERPL-SCNC: 108 MMOL/L
CHLORIDE SERPL-SCNC: 108 MMOL/L
CO2 SERPL-SCNC: 19 MMOL/L
CO2 SERPL-SCNC: 20 MMOL/L
CO2 SERPL-SCNC: 25 MMOL/L
CO2 SERPL-SCNC: 26 MMOL/L
CREAT SERPL-MCNC: 2.4 MG/DL
CREAT SERPL-MCNC: 2.5 MG/DL
CRP SERPL-MCNC: 81.4 MG/L
DIASTOLIC DYSFUNCTION: YES
DIFFERENTIAL METHOD: ABNORMAL
EOSINOPHIL # BLD AUTO: 0.2 K/UL
EOSINOPHIL # BLD AUTO: 0.2 K/UL
EOSINOPHIL # BLD AUTO: 0.3 K/UL
EOSINOPHIL # BLD AUTO: 0.3 K/UL
EOSINOPHIL NFR BLD: 1.2 %
EOSINOPHIL NFR BLD: 1.3 %
EOSINOPHIL NFR BLD: 1.8 %
EOSINOPHIL NFR BLD: 1.8 %
ERYTHROCYTE [DISTWIDTH] IN BLOOD BY AUTOMATED COUNT: 17.2 %
ERYTHROCYTE [DISTWIDTH] IN BLOOD BY AUTOMATED COUNT: 17.3 %
EST. GFR  (AFRICAN AMERICAN): 29.6 ML/MIN/1.73 M^2
EST. GFR  (AFRICAN AMERICAN): 31.1 ML/MIN/1.73 M^2
EST. GFR  (NON AFRICAN AMERICAN): 25.6 ML/MIN/1.73 M^2
EST. GFR  (NON AFRICAN AMERICAN): 26.9 ML/MIN/1.73 M^2
ESTIMATED PA SYSTOLIC PRESSURE: 44.16
GIANT PLATELETS BLD QL SMEAR: PRESENT
GLUCOSE SERPL-MCNC: 103 MG/DL
GLUCOSE SERPL-MCNC: 123 MG/DL
GLUCOSE SERPL-MCNC: 95 MG/DL
GLUCOSE SERPL-MCNC: 98 MG/DL
HCT VFR BLD AUTO: 21.6 %
HCT VFR BLD AUTO: 22.3 %
HCT VFR BLD AUTO: 22.5 %
HCT VFR BLD AUTO: 22.9 %
HGB BLD-MCNC: 7.2 G/DL
HGB BLD-MCNC: 7.3 G/DL
HGB BLD-MCNC: 7.4 G/DL
HGB BLD-MCNC: 7.6 G/DL
HYPOCHROMIA BLD QL SMEAR: ABNORMAL
INR PPP: 1.2
LDH SERPL L TO P-CCNC: 287 U/L
LYMPHOCYTES # BLD AUTO: 1.3 K/UL
LYMPHOCYTES # BLD AUTO: 1.5 K/UL
LYMPHOCYTES # BLD AUTO: 1.5 K/UL
LYMPHOCYTES # BLD AUTO: 1.6 K/UL
LYMPHOCYTES NFR BLD: 10 %
LYMPHOCYTES NFR BLD: 10.9 %
LYMPHOCYTES NFR BLD: 9.1 %
LYMPHOCYTES NFR BLD: 9.7 %
MAGNESIUM SERPL-MCNC: 1.7 MG/DL
MCH RBC QN AUTO: 28.4 PG
MCH RBC QN AUTO: 28.4 PG
MCH RBC QN AUTO: 28.7 PG
MCH RBC QN AUTO: 28.7 PG
MCHC RBC AUTO-ENTMCNC: 32.7 G/DL
MCHC RBC AUTO-ENTMCNC: 32.9 G/DL
MCHC RBC AUTO-ENTMCNC: 33.2 G/DL
MCHC RBC AUTO-ENTMCNC: 33.3 G/DL
MCV RBC AUTO: 86 FL
MCV RBC AUTO: 87 FL
MITRAL VALVE REGURGITATION: ABNORMAL
MONOCYTES # BLD AUTO: 0.8 K/UL
MONOCYTES # BLD AUTO: 0.9 K/UL
MONOCYTES # BLD AUTO: 1 K/UL
MONOCYTES # BLD AUTO: 1.1 K/UL
MONOCYTES NFR BLD: 5.1 %
MONOCYTES NFR BLD: 5.9 %
MONOCYTES NFR BLD: 7 %
MONOCYTES NFR BLD: 7.3 %
NEUTROPHILS # BLD AUTO: 11.9 K/UL
NEUTROPHILS # BLD AUTO: 12 K/UL
NEUTROPHILS # BLD AUTO: 12.3 K/UL
NEUTROPHILS # BLD AUTO: 12.6 K/UL
NEUTROPHILS NFR BLD: 81.1 %
NEUTROPHILS NFR BLD: 81.2 %
NEUTROPHILS NFR BLD: 81.8 %
NEUTROPHILS NFR BLD: 83.4 %
OVALOCYTES BLD QL SMEAR: ABNORMAL
PHOSPHATE SERPL-MCNC: 3.9 MG/DL
PLATELET # BLD AUTO: 232 K/UL
PLATELET # BLD AUTO: 241 K/UL
PLATELET # BLD AUTO: 247 K/UL
PLATELET # BLD AUTO: 261 K/UL
PLATELET BLD QL SMEAR: ABNORMAL
PMV BLD AUTO: 12 FL
PMV BLD AUTO: 12.3 FL
PMV BLD AUTO: 12.4 FL
PMV BLD AUTO: 12.7 FL
POIKILOCYTOSIS BLD QL SMEAR: SLIGHT
POLYCHROMASIA BLD QL SMEAR: ABNORMAL
POTASSIUM SERPL-SCNC: 3.1 MMOL/L
POTASSIUM SERPL-SCNC: 3.2 MMOL/L
POTASSIUM SERPL-SCNC: 3.2 MMOL/L
POTASSIUM SERPL-SCNC: 3.3 MMOL/L
PREALB SERPL-MCNC: 13 MG/DL
PROT SERPL-MCNC: 5.9 G/DL
PROT SERPL-MCNC: 6 G/DL
PROT SERPL-MCNC: 6.1 G/DL
PROT SERPL-MCNC: 6.2 G/DL
PROTHROMBIN TIME: 12.7 SEC
RBC # BLD AUTO: 2.51 M/UL
RBC # BLD AUTO: 2.57 M/UL
RBC # BLD AUTO: 2.61 M/UL
RBC # BLD AUTO: 2.65 M/UL
RETIRED EF AND QEF - SEE NOTES: 20 (ref 55–65)
SODIUM SERPL-SCNC: 140 MMOL/L
SODIUM SERPL-SCNC: 142 MMOL/L
SODIUM SERPL-SCNC: 142 MMOL/L
SODIUM SERPL-SCNC: 143 MMOL/L
TRICUSPID VALVE REGURGITATION: ABNORMAL
WBC # BLD AUTO: 14.63 K/UL
WBC # BLD AUTO: 14.86 K/UL
WBC # BLD AUTO: 14.87 K/UL
WBC # BLD AUTO: 15.67 K/UL

## 2017-08-23 PROCEDURE — 86900 BLOOD TYPING SEROLOGIC ABO: CPT

## 2017-08-23 PROCEDURE — 99233 SBSQ HOSP IP/OBS HIGH 50: CPT | Mod: 24,,, | Performed by: THORACIC SURGERY (CARDIOTHORACIC VASCULAR SURGERY)

## 2017-08-23 PROCEDURE — 25000003 PHARM REV CODE 250: Performed by: INTERNAL MEDICINE

## 2017-08-23 PROCEDURE — 93750 INTERROGATION VAD IN PERSON: CPT | Mod: ,,, | Performed by: INTERNAL MEDICINE

## 2017-08-23 PROCEDURE — 80076 HEPATIC FUNCTION PANEL: CPT

## 2017-08-23 PROCEDURE — 85025 COMPLETE CBC W/AUTO DIFF WBC: CPT | Mod: 91

## 2017-08-23 PROCEDURE — 84100 ASSAY OF PHOSPHORUS: CPT

## 2017-08-23 PROCEDURE — 80053 COMPREHEN METABOLIC PANEL: CPT | Mod: 91

## 2017-08-23 PROCEDURE — 63600175 PHARM REV CODE 636 W HCPCS: Performed by: STUDENT IN AN ORGANIZED HEALTH CARE EDUCATION/TRAINING PROGRAM

## 2017-08-23 PROCEDURE — 85730 THROMBOPLASTIN TIME PARTIAL: CPT

## 2017-08-23 PROCEDURE — C9113 INJ PANTOPRAZOLE SODIUM, VIA: HCPCS | Performed by: STUDENT IN AN ORGANIZED HEALTH CARE EDUCATION/TRAINING PROGRAM

## 2017-08-23 PROCEDURE — 27200188 HC TRANSDUCER, ART ADULT/PEDS

## 2017-08-23 PROCEDURE — 94761 N-INVAS EAR/PLS OXIMETRY MLT: CPT

## 2017-08-23 PROCEDURE — 93306 TTE W/DOPPLER COMPLETE: CPT | Mod: 26,,, | Performed by: INTERNAL MEDICINE

## 2017-08-23 PROCEDURE — 84134 ASSAY OF PREALBUMIN: CPT

## 2017-08-23 PROCEDURE — 20000000 HC ICU ROOM

## 2017-08-23 PROCEDURE — 92526 ORAL FUNCTION THERAPY: CPT

## 2017-08-23 PROCEDURE — 93750 INTERROGATION VAD IN PERSON: CPT | Performed by: STUDENT IN AN ORGANIZED HEALTH CARE EDUCATION/TRAINING PROGRAM

## 2017-08-23 PROCEDURE — 97535 SELF CARE MNGMENT TRAINING: CPT

## 2017-08-23 PROCEDURE — 25000003 PHARM REV CODE 250: Performed by: STUDENT IN AN ORGANIZED HEALTH CARE EDUCATION/TRAINING PROGRAM

## 2017-08-23 PROCEDURE — 83880 ASSAY OF NATRIURETIC PEPTIDE: CPT

## 2017-08-23 PROCEDURE — 25000003 PHARM REV CODE 250: Performed by: THORACIC SURGERY (CARDIOTHORACIC VASCULAR SURGERY)

## 2017-08-23 PROCEDURE — 99232 SBSQ HOSP IP/OBS MODERATE 35: CPT | Mod: GC,,, | Performed by: ANESTHESIOLOGY

## 2017-08-23 PROCEDURE — 99233 SBSQ HOSP IP/OBS HIGH 50: CPT | Mod: 25,,, | Performed by: INTERNAL MEDICINE

## 2017-08-23 PROCEDURE — 93750 INTERROGATION VAD IN PERSON: CPT | Mod: ,,, | Performed by: THORACIC SURGERY (CARDIOTHORACIC VASCULAR SURGERY)

## 2017-08-23 PROCEDURE — 83051 HEMOGLOBIN PLASMA: CPT

## 2017-08-23 PROCEDURE — 80048 BASIC METABOLIC PNL TOTAL CA: CPT

## 2017-08-23 PROCEDURE — 27000221 HC OXYGEN, UP TO 24 HOURS

## 2017-08-23 PROCEDURE — 93306 TTE W/DOPPLER COMPLETE: CPT

## 2017-08-23 PROCEDURE — 83735 ASSAY OF MAGNESIUM: CPT

## 2017-08-23 PROCEDURE — B4185 PARENTERAL SOL 10 GM LIPIDS: HCPCS | Performed by: STUDENT IN AN ORGANIZED HEALTH CARE EDUCATION/TRAINING PROGRAM

## 2017-08-23 PROCEDURE — 86850 RBC ANTIBODY SCREEN: CPT

## 2017-08-23 PROCEDURE — 99232 SBSQ HOSP IP/OBS MODERATE 35: CPT | Mod: ,,, | Performed by: INTERNAL MEDICINE

## 2017-08-23 PROCEDURE — 63600175 PHARM REV CODE 636 W HCPCS: Performed by: INTERNAL MEDICINE

## 2017-08-23 PROCEDURE — 80053 COMPREHEN METABOLIC PANEL: CPT

## 2017-08-23 PROCEDURE — 97530 THERAPEUTIC ACTIVITIES: CPT

## 2017-08-23 PROCEDURE — 97110 THERAPEUTIC EXERCISES: CPT

## 2017-08-23 PROCEDURE — 27000248 HC VAD-ADDITIONAL DAY

## 2017-08-23 PROCEDURE — 94799 UNLISTED PULMONARY SVC/PX: CPT

## 2017-08-23 PROCEDURE — 85610 PROTHROMBIN TIME: CPT

## 2017-08-23 PROCEDURE — 86920 COMPATIBILITY TEST SPIN: CPT

## 2017-08-23 PROCEDURE — 86140 C-REACTIVE PROTEIN: CPT

## 2017-08-23 PROCEDURE — A4216 STERILE WATER/SALINE, 10 ML: HCPCS | Performed by: THORACIC SURGERY (CARDIOTHORACIC VASCULAR SURGERY)

## 2017-08-23 PROCEDURE — 83615 LACTATE (LD) (LDH) ENZYME: CPT

## 2017-08-23 RX ORDER — WARFARIN 2 MG/1
4 TABLET ORAL ONCE
Status: COMPLETED | OUTPATIENT
Start: 2017-08-23 | End: 2017-08-23

## 2017-08-23 RX ORDER — AMLODIPINE BESYLATE 2.5 MG/1
2.5 TABLET ORAL DAILY
Status: DISCONTINUED | OUTPATIENT
Start: 2017-08-23 | End: 2017-08-31

## 2017-08-23 RX ORDER — POTASSIUM CHLORIDE 750 MG/1
30 CAPSULE, EXTENDED RELEASE ORAL ONCE
Status: COMPLETED | OUTPATIENT
Start: 2017-08-23 | End: 2017-08-23

## 2017-08-23 RX ADMIN — SOYBEAN OIL 250 ML: 20 INJECTION, SOLUTION INTRAVENOUS at 09:08

## 2017-08-23 RX ADMIN — Medication 10 ML: at 05:08

## 2017-08-23 RX ADMIN — Medication 10 ML: at 11:08

## 2017-08-23 RX ADMIN — PANTOPRAZOLE SODIUM 8 MG/HR: 40 INJECTION, POWDER, FOR SOLUTION INTRAVENOUS at 08:08

## 2017-08-23 RX ADMIN — Medication 10 ML: at 01:08

## 2017-08-23 RX ADMIN — SODIUM CHLORIDE 0.5 G: 9 INJECTION, SOLUTION INTRAVENOUS at 09:08

## 2017-08-23 RX ADMIN — Medication 10 ML: at 12:08

## 2017-08-23 RX ADMIN — CALCIUM GLUCONATE: 94 INJECTION, SOLUTION INTRAVENOUS at 09:08

## 2017-08-23 RX ADMIN — POTASSIUM CHLORIDE 10 MEQ: 750 CAPSULE, EXTENDED RELEASE ORAL at 05:08

## 2017-08-23 RX ADMIN — WARFARIN SODIUM 4 MG: 2 TABLET ORAL at 05:08

## 2017-08-23 RX ADMIN — PANTOPRAZOLE SODIUM 8 MG/HR: 40 INJECTION, POWDER, FOR SOLUTION INTRAVENOUS at 02:08

## 2017-08-23 RX ADMIN — PANTOPRAZOLE SODIUM 8 MG/HR: 40 INJECTION, POWDER, FOR SOLUTION INTRAVENOUS at 06:08

## 2017-08-23 RX ADMIN — AMLODIPINE BESYLATE 2.5 MG: 2.5 TABLET ORAL at 11:08

## 2017-08-23 NOTE — PT/OT/SLP PROGRESS
"Occupational Therapy  Treatment    Suman Hayden   MRN: [id number]   Admitting Diagnosis: LVAD (left ventricular assist device) present    OT Date of Treatment: 08/23/17   OT Start Time: 0915  OT Stop Time: 0945  OT Total Time (min): 30 min    Billable Minutes:  Self Care/Home Management 15 and Therapeutic Activity 10    General Precautions: Standard, aspiration, fall, LVAD, sternal, contact  Orthopedic Precautions: N/A    Subjective:  Communicated with nsg prior to session.  "I'm OK" pt states.  Pain/Comfort  Pain Rating 1: 0/10    Objective:    Pt found supine in bed. LVAD to wall power.      Functional Mobility:  Bed Mobility:  Supine to Sit: Maximum Assistance    Transfers:   Sit <> Stand Assistance: Maximum Assistance  Sit <> Stand Assistive Device: No Assistive Device  Bed <> Chair Technique: Stand Pivot  Bed <> Chair Transfer Assistance: Maximum Assistance        Activities of Daily Living:       UE Dressing Level of Assistance: Maximum assistance  LE Dressing Level of Assistance: Total assistance  Grooming Position: Seated  Grooming Level of Assistance: Stand by assistance   Toileting: TOTAL A      Pt demo Poor+ sitting balance. Pt with posterior lean and cues needed for hand placement to adhere to sternal precautions.  2 trials of standing completed with MAX A. First standing trials additional person needed to complete perineal hygiene.    AM-PAC 6 CLICK ADL   How much help from another person does this patient currently need?   1 = Unable, Total/Dependent Assistance  2 = A lot, Maximum/Moderate Assistance  3 = A little, Minimum/Contact Guard/Supervision  4 = None, Modified [place]/Independent    Putting on and taking off regular lower body clothing? : 1  Bathing (including washing, rinsing, drying)?: 1  Toileting, which includes using toilet, bedpan, or urinal? : 1  Putting on and taking off regular upper body clothing?: 2  Taking care of personal grooming such as brushing teeth?: 3  Eating meals?: " "1  Total Score: 9     AM-PAC Raw Score CMS "G-Code Modifier Level of Impairment Assistance   6 % Total / Unable   7 - 8 CM 80 - 100% Maximal Assist   9-13 CL 60 - 80% Moderate Assist   14 - 19 CK 40 - 60% Moderate Assist   20 - 22 CJ 20 - 40% Minimal Assist   23 CI 1-20% SBA / CGA   24 CH 0% Independent/ Mod I       Patient left up in chair with all lines intact, call button in reach, nsg notified and spouse present    ASSESSMENT:  Suman Hayden is a 67 y.o. male with a medical diagnosis of LVAD (left ventricular assist device) present and tolerated session fairly well. Pt lethargic but cooperative. Pt demo overall decreased functional endurance and decreased mobility and ADL skills. Pt to benefit from cont OT to address stated goals. .    Rehab identified problem list/impairments: Rehab identified problem list/impairments: weakness, impaired functional mobilty, impaired endurance, gait instability, impaired balance, impaired self care skills    Rehab potential is good.    Activity tolerance: Fair    Discharge recommendations: Discharge Facility/Level Of Care Needs: rehabilitation facility     GOALS:    Occupational Therapy Goals        Problem: Occupational Therapy Goal    Goal Priority Disciplines Outcome Interventions   Occupational Therapy Goal     OT, PT/OT Ongoing (interventions implemented as appropriate)    Description:  Goals to be met by:  2 weeks 8/28/17    Patient will increase functional independence with ADLs by performing:  Feeding: Independent   UE Dressing with Supervision.  LE Dressing with Supervision.  Grooming while standing with Supervision.  Toileting from toilet with Supervision for hygiene and clothing management.   Stand pivot transfers with Supervision.  Toilet transfer to toilet with Supervision.  Pt will be supervision  with LVAD yasmin't.                  Multidisciplinary Problems (Resolved)        Problem: Occupational Therapy Goal    Goal Priority Disciplines Outcome Interventions "   Occupational Therapy Goal   (Resolved)     OT, PT/OT  Error    Description:  Goals to be met by: 8/04/2017    Patient will increase functional independence with ADLs by performing:    Increased functional strength to 5/5 for improved ADL performance.  Upper extremity exercise program and R LE ankle pumps  3x 10 reps per handout, with independence.                      Plan:  Patient to be seen 6 x/week to address the above listed problems via self-care/home management, therapeutic activities, therapeutic exercises  Plan of Care expires: 08/29/17  Plan of Care reviewed with: patient         DELMY Navarro  08/23/2017

## 2017-08-23 NOTE — SUBJECTIVE & OBJECTIVE
Interval History:   YONATHAN  Continues to make excellent UOP with 2.3L recorded yesterday.  Net even volume status with addition of insensible loses.  He is in good spirits this morning, sitting in bedside chair watching tv.  Kidney function remains stable from yesterday without significant increases in SCr or BUN.  H/H stable.  Serial BNP's trending up.    Review of patient's allergies indicates:  No Known Allergies  Current Facility-Administered Medications   Medication Frequency    0.9%  NaCl infusion (for blood administration) Q24H PRN    albumin human 5% bottle 500 mL PRN    albuterol nebulizer solution 2.5 mg Q4H PRN    amlodipine tablet 2.5 mg Daily    bisacodyl suppository 10 mg Daily PRN    dextrose 50% injection 12.5 g PRN    DOBUtamine 1000 mg in D5W 250 mL infusion (premix non-titrating) Continuous    EPINEPHrine (ADRENALIN) 4 mg in sodium chloride 0.9% 250 mL infusion Continuous    ertapenem (INVANZ) 0.5 g in sodium chloride 0.9% 50 mL IVPB Q24H    fat emulsion 20% infusion 250 mL Daily    glucagon (human recombinant) injection 1 mg PRN    heparin 25,000 units in dextrose 5% 250 mL (100 units/mL) infusion (heparin infusion) Continuous    insulin aspart pen 0-5 Units Q4H PRN    magnesium oxide split tablet 200 mg Once    ondansetron injection 4 mg Q6H PRN    pantoprazole 40 mg in dextrose 5 % 100 mL infusion (ready to mix system) Continuous    sodium chloride 0.9% flush 10 mL Q6H    And    sodium chloride 0.9% flush 10 mL PRN    sodium chloride 0.9% flush 10 mL Q6H    And    sodium chloride 0.9% flush 10 mL PRN    TPN ADULT CENTRAL LINE CUSTOM Continuous    TPN ADULT CENTRAL LINE CUSTOM Continuous    warfarin (COUMADIN) tablet 4 mg Once    warfarin tablet 3 mg Once       Objective:     Vital Signs (Most Recent):  Temp: 98.7 °F (37.1 °C) (08/23/17 1100)  Pulse: 79 (08/23/17 1100)  Resp: (!) 44 (08/23/17 1100)  BP: (!) 90/0 (08/23/17 1100)  SpO2: 100 % (08/23/17 1100)  O2 Device  (Oxygen Therapy): room air (08/23/17 0849) Vital Signs (24h Range):  Temp:  [98.5 °F (36.9 °C)-98.9 °F (37.2 °C)] 98.7 °F (37.1 °C)  Pulse:  [] 79  Resp:  [18-44] 44  SpO2:  [97 %-100 %] 100 %  BP: (80-90)/(0) 90/0  Arterial Line BP: ()/(60-82) 115/68     Weight: 92.6 kg (204 lb 2.3 oz) (08/23/17 0500)  Body mass index is 31.04 kg/m².  Body surface area is 2.11 meters squared.    I/O last 3 completed shifts:  In: 4392 [I.V.:1409; Blood:264; Other:700]  Out: 4225 [Urine:3525; Other:700]    Physical Exam   Constitutional: He is oriented to person, place, and time. He appears well-developed and well-nourished. No distress.   HENT:   Head: Normocephalic and atraumatic.   Eyes: EOM are normal.   Cardiovascular: Exam reveals no gallop and no friction rub.    No murmur heard.  Smooth LVAD hum   Pulmonary/Chest: Effort normal and breath sounds normal. No respiratory distress. He has no wheezes. He has no rales.   Abdominal: Soft. There is no tenderness.   Musculoskeletal: He exhibits edema (2+ lower ext.).   Neurological: He is alert and oriented to person, place, and time.   Skin: Skin is warm and dry. No rash noted. He is not diaphoretic.   Vitals reviewed.      Significant Labs:  CBC:   Recent Labs  Lab 08/23/17  0253 08/23/17  1120   WBC 15.67*  --    RBC 2.65* 2.57*   HGB 7.6* 7.3*   HCT 22.9* 22.3*    247   MCV 86 87   MCH 28.7 28.4   MCHC 33.2 32.7     CMP:   Recent Labs  Lab 08/23/17  0253      CALCIUM 8.0*   ALBUMIN 1.8*   PROT 6.0      K 3.1*   CO2 20*      *   CREATININE 2.5*   ALKPHOS 239*   ALT 37   AST 40   BILITOT 1.5*

## 2017-08-23 NOTE — PROGRESS NOTES
[facility]  Nephrology  Progress Note    Patient Name: Suman Hayden  MRN: [id number]  Admission Date: 7/18/2017  Hospital Length of Stay: 36 days  Attending Provider: Sunny Downing MD   Primary Care Physician: Joe Ernst MD  Principal Problem:LVAD (left ventricular assist device) present    Subjective:     HPI: Mr. Will is a 68 yo AAM with PMHx of NICM, HTN, HLD, and CKD III who presented to the hospital on 7/18 after syncopal episode at home.  He underwent LVAD placement on 7/27.  Labs that day showed a spike in his SCr to 1.5, but he quickly returned to baseline (1.2-1.3) and stayed stable until 08/01 when he increased to 1.5 and has steadily remained above baseline since, up to 2.3 on consult.  He has remained on lasix infusion with intermittent dosages of diuril to aid in diuresis.  On 08/3, there was a reported decrease in LVAD flows which resolved with decrease in lasix infusion and administration of 1 unit of PRBCs, likely causing decrease renal perfusion leading to an ischemic event.  He continues with adequate UOP on lasix gtt. He was transferred to the floor from ICU on 08/08 and was noted to become hypotensive with a doppler pressure of 58, since been requiring pressors for BP support.  Nephrology was consulted for INDIRA and decreased UOP.    Interval History:   NAEON.  Continues to make excellent UOP with 2.3L recorded yesterday.  Net even volume status with addition of insensible loses.  He is in good spirits this morning, sitting in bedside chair watching tv.  Kidney function remains stable from yesterday without significant increases in SCr or BUN.  H/H stable.  Serial BNP's trending up.    Review of patient's allergies indicates:  No Known Allergies  Current Facility-Administered Medications   Medication Frequency    0.9%  NaCl infusion (for blood administration) Q24H PRN    albumin human 5% bottle 500 mL PRN    albuterol nebulizer solution 2.5 mg Q4H PRN    amlodipine  tablet 2.5 mg Daily    bisacodyl suppository 10 mg Daily PRN    dextrose 50% injection 12.5 g PRN    DOBUtamine 1000 mg in D5W 250 mL infusion (premix non-titrating) Continuous    EPINEPHrine (ADRENALIN) 4 mg in sodium chloride 0.9% 250 mL infusion Continuous    ertapenem (INVANZ) 0.5 g in sodium chloride 0.9% 50 mL IVPB Q24H    fat emulsion 20% infusion 250 mL Daily    glucagon (human recombinant) injection 1 mg PRN    heparin 25,000 units in dextrose 5% 250 mL (100 units/mL) infusion (heparin infusion) Continuous    insulin aspart pen 0-5 Units Q4H PRN    magnesium oxide split tablet 200 mg Once    ondansetron injection 4 mg Q6H PRN    pantoprazole 40 mg in dextrose 5 % 100 mL infusion (ready to mix system) Continuous    sodium chloride 0.9% flush 10 mL Q6H    And    sodium chloride 0.9% flush 10 mL PRN    sodium chloride 0.9% flush 10 mL Q6H    And    sodium chloride 0.9% flush 10 mL PRN    TPN ADULT CENTRAL LINE CUSTOM Continuous    TPN ADULT CENTRAL LINE CUSTOM Continuous    warfarin (COUMADIN) tablet 4 mg Once    warfarin tablet 3 mg Once       Objective:     Vital Signs (Most Recent):  Temp: 98.7 °F (37.1 °C) (08/23/17 1100)  Pulse: 79 (08/23/17 1100)  Resp: (!) 44 (08/23/17 1100)  BP: (!) 90/0 (08/23/17 1100)  SpO2: 100 % (08/23/17 1100)  O2 Device (Oxygen Therapy): room air (08/23/17 0849) Vital Signs (24h Range):  Temp:  [98.5 °F (36.9 °C)-98.9 °F (37.2 °C)] 98.7 °F (37.1 °C)  Pulse:  [] 79  Resp:  [18-44] 44  SpO2:  [97 %-100 %] 100 %  BP: (80-90)/(0) 90/0  Arterial Line BP: ()/(60-82) 115/68     Weight: 92.6 kg (204 lb 2.3 oz) (08/23/17 0500)  Body mass index is 31.04 kg/m².  Body surface area is 2.11 meters squared.    I/O last 3 completed shifts:  In: 4392 [I.V.:1409; Blood:264; Other:700]  Out: 4225 [Urine:3525; Other:700]    Physical Exam   Constitutional: He is oriented to person, place, and time. He appears well-developed and well-nourished. No distress.   HENT:    Head: Normocephalic and atraumatic.   Eyes: EOM are normal.   Cardiovascular: Exam reveals no gallop and no friction rub.    No murmur heard.  Smooth LVAD hum   Pulmonary/Chest: Effort normal and breath sounds normal. No respiratory distress. He has no wheezes. He has no rales.   Abdominal: Soft. There is no tenderness.   Musculoskeletal: He exhibits edema (2+ lower ext.).   Neurological: He is alert and oriented to person, place, and time.   Skin: Skin is warm and dry. No rash noted. He is not diaphoretic.   Vitals reviewed.      Significant Labs:  CBC:   Recent Labs  Lab 08/23/17  0253 08/23/17  1120   WBC 15.67*  --    RBC 2.65* 2.57*   HGB 7.6* 7.3*   HCT 22.9* 22.3*    247   MCV 86 87   MCH 28.7 28.4   MCHC 33.2 32.7     CMP:   Recent Labs  Lab 08/23/17 0253      CALCIUM 8.0*   ALBUMIN 1.8*   PROT 6.0      K 3.1*   CO2 20*      *   CREATININE 2.5*   ALKPHOS 239*   ALT 37   AST 40   BILITOT 1.5*          Assessment/Plan:     Acute kidney injury superimposed on CKD    INDIRA on CKD III  -INDIRA appears to be multifactorial, Ischemic ATN from renal hypoperfusion vs. Septic ATN.  -Baseline SCr appears to be around 1.2-1.3.  He has had multiple INDIRA's in the past, likely 2/2 to poor renal perfusion from cardiomyopathy.  -had initial insult on the day of LVAD surgery on the 27th with a spike in his SCr, but it quickly resolved and returned to baseline.  His SCr stayed stable until the 1st when it increased to 1.5 and has continued to trend up until 2.3 today.   -Signs of decreased perfusion occurred on July 31st with reported drops in LVAD Flows which resolved with decrease in lasix dose and 1 unit of PRBC infusion.     -net even volume status in past 24 hours counting insensible loses.  Received 1 dose of lasix between PRBC transfusion.  -No overt signs of uremic symptoms on exam.  BUN/SCr stable from  Yesterday.  -rise in BUN more related to pre-renal azotemia 2/2 to TPN (protein  content decreased by dietician today) and likely GI bleed.    -No need for RRT.    -with BNP trending up and increased edema today to lower extremities, would recommend lasix 80 mg IV x 1 today and monitor response.            Alfonzo Medina, DARLING  Nephrology  [facility]  Pager:  [phone]        I have reviewed and concur with the NPs history, physical, assessment, and plan. I have personally interviewed and examined the patient at bedside.

## 2017-08-23 NOTE — PROGRESS NOTES
[facility]  Heart Transplant  Progress Note    Patient Name: Suman Hayden  MRN: [id number]  Admission Date: 7/18/2017  Hospital Length of Stay: 36 days  Attending Physician: Sunny Downing MD  Primary Care Provider: Joe Ernst MD  Principal Problem:LVAD (left ventricular assist device) present    Subjective:     Interval History: Patient sitting in bed watching tv.  Has no complaints this morning.  Continues to have good urine output.  Wife at bedside.     Continuous Infusions:   DOBUTamine 5 mcg/kg/min (08/23/17 1400)    epinephrine infusion Stopped (08/15/17 1500)    heparin (porcine) in 5 % dex 500 Units/hr (08/23/17 1400)    pantoprazole 40 mg in dextrose 5 % 100 mL infusion (ready to mix system) 8 mg/hr (08/23/17 1400)    TPN ADULT CENTRAL LINE CUSTOM 50 mL/hr at 08/23/17 1400    TPN ADULT CENTRAL LINE CUSTOM       Scheduled Meds:   amlodipine  2.5 mg Oral Daily    ertapenem (INVANZ) IVPB  0.5 g Intravenous Q24H    fat emulsion 20%  250 mL Intravenous Daily    magnesium oxide  200 mg Oral Once    sodium chloride 0.9%  10 mL Intravenous Q6H    sodium chloride 0.9%  10 mL Intravenous Q6H    warfarin  4 mg Oral Once    warfarin  3 mg Oral Once     PRN Meds:sodium chloride, albumin human 5%, albuterol sulfate, bisacodyl, dextrose 50%, glucagon (human recombinant), insulin aspart, ondansetron, Flushing PICC Protocol **AND** sodium chloride 0.9% **AND** sodium chloride 0.9%, Flushing PICC Protocol **AND** sodium chloride 0.9% **AND** sodium chloride 0.9%    Review of patient's allergies indicates:  No Known Allergies  Objective:     Vital Signs (Most Recent):  Temp: 98.7 °F (37.1 °C) (08/23/17 1100)  Pulse: 82 (08/23/17 1400)  Resp: (!) 24 (08/23/17 1400)  BP: (!) 90/0 (08/23/17 1100)  SpO2: 100 % (08/23/17 1400) Vital Signs (24h Range):  Temp:  [98.5 °F (36.9 °C)-98.9 °F (37.2 °C)] 98.7 °F (37.1 °C)  Pulse:  [] 82  Resp:  [18-44] 24  SpO2:  [97 %-100 %] 100 %  BP:  (80-90)/(0) 90/0  Arterial Line BP: ()/(60-82) 94/67     Weight: 92.6 kg (204 lb 2.3 oz)  Body mass index is 31.04 kg/m².      Intake/Output Summary (Last 24 hours) at 08/23/17 1411  Last data filed at 08/23/17 1400   Gross per 24 hour   Intake             2685 ml   Output             2245 ml   Net              440 ml     Physical Exam   Constitutional: He appears well-developed and well-nourished.   HENT:   Head: Normocephalic and atraumatic.   Eyes: EOM are normal. Pupils are equal, round, and reactive to light.   Cardiovascular: Normal rate, regular rhythm and normal heart sounds.  Exam reveals no gallop and no friction rub.    No murmur heard.  + LVAD hum    Pulmonary/Chest: Effort normal. No respiratory distress. He has no wheezes. He has no rales.   Abdominal: Soft. Bowel sounds are normal.   Musculoskeletal: He exhibits no edema.   Neurological: He is alert.   Nursing note and vitals reviewed      Significant Labs:  CBC:    Recent Labs  Lab 08/23/17  1120   WBC 14.87*   RBC 2.57*   HGB 7.3*   HCT 22.3*      MCV 87   MCH 28.4   MCHC 32.7     BNP:    Recent Labs  Lab 08/23/17  0253   BNP 3,515*     CMP:    Recent Labs  Lab 08/23/17  1120   *   CALCIUM 8.4*   ALBUMIN 2.0*   PROT 6.2      K 3.2*   CO2 26      *   CREATININE 2.5*   ALKPHOS 239*   ALT 36   AST 40   BILITOT 1.5*      Coagulation:     Recent Labs  Lab 08/23/17  0253   INR 1.2   APTT 29.2     LDH:    Recent Labs  Lab 08/21/17  0300 08/22/17  0405 08/23/17  0253    316* 287*     Microbiology:  Microbiology Results (last 7 days)     Procedure Component Value Units Date/Time    IV catheter culture [[id number]] Collected:  08/16/17 1730    Order Status:  Completed Specimen:  Catheter Tip from Catheter Tip, PICC Updated:  08/18/17 1055     Aerobic Culture - Cath tip No growth    Blood culture [[id number]] Collected:  08/11/17 1326    Order Status:  Completed Specimen:  Blood from Peripheral, Forearm, Left  Updated:  08/16/17 1812     Blood Culture, Routine No growth after 5 days.          Estimated Creatinine Clearance: 31.7 mL/min (based on Cr of 2.5).        Assessment and Plan:     * LVAD (left ventricular assist device) present    - LVAD HM III. Placed this admit 7/27/17  - CTS Primary  - chest closure (7/28/17) and extubated (7/29/17)  -Reintubated 8/9/17 and extubated 8/13/17  -Now on    - Speed 5200  - LDH stable  - AC per CTS          Bacteremia    -ESBL from blood culture 8/9   -Ertapenem, ID following        V-tach    - Rec restarting Amio        Hyperlipidemia    - Rec resuming pravastatin once liver enzymes stabilize         Hepatitis B core antibody positive since 2012    - will defer liver biopsy as CTS not requiring it  - ID consult cleared the patient for sx  - case discussed with hepatology, low suspicion for liver involvement        Electrolyte abnormality    -Recommending electrolyte replacement to keep K around 4 and Mg around 2        Upper GI bleed    -GI following. EGD done.         Atrial fibrillation    -AC, Amio per C TS          Atrial tachycardia    - [id number] - 3  -Rec restarting Amio. AC per CTS        AICD discharge    - appropriate in the setting of VT aggravated by underlying AT/AFL (earlier during the admission)  - 7/28 - setting of AF undersense - device reprogrammed to VVI 80  - tachy therapy turned off  - Rec restarting Amio  - EP planning to do lead revision             MELISSA Jon  Heart Transplant spectralink: [id number]  [facility]

## 2017-08-23 NOTE — PROGRESS NOTES
Daily E and M and VAD Interrogation Note    Reason for Visit:  Patient is seen in follow up for management of:  [] HeartMate II  [] Heartware [] Total artificial heart       [] ECMO           [x] Other - HM III     Interval History:  [] Interval history unobtainable due to intubation.  The [x] implant/[] explant date was 7/27/17    Events -  NAEON.  Having BM and making good urine.  Sat in chair o/n       Review of Systems:   Negative except as above.      Medications:  Current Facility-Administered Medications   Medication Dose Route Frequency Provider Last Rate Last Dose    0.9%  NaCl infusion (for blood administration)   Intravenous Q24H PRN Andres Uriarte MD        albumin human 5% bottle 500 mL  500 mL Intravenous PRN Shayne Espinoza MD   500 mL at 08/09/17 0700    albuterol nebulizer solution 2.5 mg  2.5 mg Nebulization Q4H PRN Shayne Espinoza MD        amlodipine tablet 2.5 mg  2.5 mg Oral Daily Andres Uriarte MD   2.5 mg at 08/23/17 1138    bisacodyl suppository 10 mg  10 mg Rectal Daily PRN Shayne Espinoza MD   10 mg at 08/06/17 2036    dextrose 50% injection 12.5 g  12.5 g Intravenous PRN Charbel Ritter MD        DOBUtamine 1000 mg in D5W 250 mL infusion (premix non-titrating)  5 mcg/kg/min (Dosing Weight) Intravenous Continuous Sunny Downing MD 6 mL/hr at 08/23/17 1300 5 mcg/kg/min at 08/23/17 1300    EPINEPHrine (ADRENALIN) 4 mg in sodium chloride 0.9% 250 mL infusion  0.01 mcg/kg/min (Dosing Weight) Intravenous Continuous Shayne Espinoza MD   Stopped at 08/15/17 1500    ertapenem (INVANZ) 0.5 g in sodium chloride 0.9% 50 mL IVPB  0.5 g Intravenous Q24H Angie Torres  mL/hr at 08/22/17 2139 0.5 g at 08/22/17 2139    fat emulsion 20% infusion 250 mL  250 mL Intravenous Daily Andres Uriarte MD        glucagon (human recombinant) injection 1 mg  1 mg Intramuscular PRN Charbel Ritter MD        heparin 25,000 units in dextrose 5% 250 mL (100  units/mL) infusion (heparin infusion)  500 Units/hr Intravenous Continuous Adnres Uriarte MD 5 mL/hr at 08/23/17 1300 500 Units/hr at 08/23/17 1300    insulin aspart pen 0-5 Units  0-5 Units Subcutaneous Q4H PRN Charbel Ritter MD   2 Units at 08/10/17 0751    magnesium oxide split tablet 200 mg  200 mg Oral Once Edwige Clay MD        ondansetron injection 4 mg  4 mg Intravenous Q6H PRN Shayne Espinoza MD   4 mg at 08/08/17 2112    pantoprazole 40 mg in dextrose 5 % 100 mL infusion (ready to mix system)  8 mg/hr Intravenous Continuous Shayne Espinoza MD 20 mL/hr at 08/23/17 1300 8 mg/hr at 08/23/17 1300    sodium chloride 0.9% flush 10 mL  10 mL Intravenous Q6H Sunny Downing MD   10 mL at 08/23/17 1135    And    sodium chloride 0.9% flush 10 mL  10 mL Intravenous PRN Sunny Downing MD   10 mL at 08/10/17 1151    sodium chloride 0.9% flush 10 mL  10 mL Intravenous Q6H Sunny Downing MD   10 mL at 08/23/17 1137    And    sodium chloride 0.9% flush 10 mL  10 mL Intravenous PRN Sunny Downing MD        TPN ADULT CENTRAL LINE CUSTOM   Intravenous Continuous Andres Uriarte MD 50 mL/hr at 08/23/17 1300      TPN ADULT CENTRAL LINE CUSTOM   Intravenous Continuous Andres Uriarte MD        warfarin (COUMADIN) tablet 4 mg  4 mg Oral Once Andres Uriarte MD        warfarin tablet 3 mg  3 mg Oral Once Sharlene Tran MD         Physical Examination:  Vital Signs:   Vitals:    08/23/17 1248   BP:    Pulse: 79   Resp: 20   Temp:      Cardiovascular:  [x] Regular rate and rhythm [] Irregular []  None (MATT) []  Other  []  No edema [x]  Edema present  [x]  Clear to auscultation  []  Rales to []  Coarse  []  No rales but   [] Pedal Pulses absent  [x]  Pulses  + throughout  Skin:  Incision is [x]  Clean, dry and intact.  []  Other   Sternum:  [x]  Stable []  Unstable  Driveline(s):   [x]  Clean, dry and intact. []  Other     Labs:  BMP  Lab Results   Component Value Date      08/23/2017    K 3.2 (L) 08/23/2017     08/23/2017    CO2 26 08/23/2017     (H) 08/23/2017    CREATININE 2.5 (H) 08/23/2017    CALCIUM 8.4 (L) 08/23/2017    ANIONGAP 9 08/23/2017    ESTGFRAFRICA 29.6 (A) 08/23/2017    EGFRNONAA 25.6 (A) 08/23/2017     Magnesium   Date Value Ref Range Status   08/23/2017 1.7 1.6 - 2.6 mg/dL Final     Lab Results   Component Value Date    WBC 14.87 (H) 08/23/2017    HGB 7.3 (L) 08/23/2017    HCT 22.3 (L) 08/23/2017    MCV 87 08/23/2017     08/23/2017     Lab Results   Component Value Date    INR 1.2 08/23/2017    INR 1.2 08/22/2017    INR 1.2 08/21/2017     BNP   Date Value Ref Range Status   08/23/2017 3,515 (H) 0 - 99 pg/mL Final     Comment:     Values of less than 100 pg/ml are consistent with non-CHF populations.   08/21/2017 1,804 (H) 0 - 99 pg/mL Final     Comment:     Values of less than 100 pg/ml are consistent with non-CHF populations.   08/18/2017 2,538 (H) 0 - 99 pg/mL Final     Comment:     Values of less than 100 pg/ml are consistent with non-CHF populations.     LD   Date Value Ref Range Status   08/23/2017 287 (H) 110 - 260 U/L Final     Comment:     Results are increased in hemolyzed samples.   08/22/2017 316 (H) 110 - 260 U/L Final     Comment:     Results are increased in hemolyzed samples.   08/21/2017 258 110 - 260 U/L Final     Comment:     Results are increased in hemolyzed samples.     X-Rays:  [x]  I reviewed today's Chest x-ray    Procedure:  Device Interrogation including analysis of device parameters.  Current Settings   [x]  Ventricular Assist Device  []  Total Artificial Heart interrogated  Review of device function is [x]  Stable []  Other   TXP LVAD INTERROGATIONS 8/23/2017 8/23/2017 8/23/2017 8/23/2017 8/23/2017 8/23/2017 8/23/2017   Type HeartMate3 HeartMate3 HeartMate3 HeartMate3 HeartMate3 HeartMate3 HeartMate3   Flow 3.9 3.9 3.7 4.1 3.8 3.9 4.2   Speed 5200 5200 5200 5200 5200 5200 5200   PI 4.3 4.3 5.8 4.0 4.5 4.8 4.0    Power ([place]) 3.5 3.5 3.7 3.5 3.6 3.6 3.6   LSL 4800 4800 4800 4800 4800 4800 4800   Pulsatility Intermittent pulse Intermittent pulse Intermittent pulse Intermittent pulse Intermittent pulse Intermittent pulse Intermittent pulse   Some recent data might be hidden       Assessment:  [x]  Primary Cardiomyopathy [x]  Congestive Heart Failure   []  Atrial Fibrillation []  Ventricular Tachycardia   [x]  Aftercare cardiac device [x]  Long term (current) use of anticoagulants   []  Ventilator-associated pneumonia []  Pneumonia viral, unspecified   []  Pneumonia, bacterial, unspecified []  Pneumonia, organism unspecified   [x]  Hemorrhage of GI tract, unspecified    []  Nosebleed []  Driveline infection   []  Infection VAD device []  New onset of    []        Plan:  [x]  Interval history obtained from ICU attending team member during rounding today  [x]  VAD/MATT teaching performed with patient  [x]  Mobilization / Physical Therapy ongoing  [x]  Anticoagulation [x]  Ongoing []  Held  []  Studies ordered  []   To OR today for closure.       Total time spent was 30 minutes.  Of which more than 50 percent of the care dominated counseling and coordinating care with different team members. The VAD was interrogated and all parameters were WNL and no significant findings were found in the history. All these findings are documented in the note above.    Neuro:  - Alert and oriented    Resp:  - Extubated  - Resp cultures with ESBL - Ertapenem per ID   - Minimize supplemental O2  - Pulmonary toilet    CV:  - HDS; LVAD numbers stable  -   - Dobutamine at 2.5  - Dopamine at 3  - Starting Norvasc   - Hold  given GI bleed  - LVAD numbers stable      Heme:  - Hgb/Hct stable after transfusion    - Continue to trend  - INR 1.2 this AM   - PICC in place  - Holding ASA 2/2 bleeding  - Coumadin - 4mg   - Heparin - PTT 40-50 today    ID:  - afebrile  - Ertapenem started for ESBL pos resp culture  - WBC 14.9  - PICC replaced  8/17, replace today   - Appreciate ID recs  - continue to monitor     Renal:  - Singer in place  - Strict I/Os   - 2.3L UOP  - Lasix gtt off  -  this AM   - Getting HD for clearance   - Nephro is following.      FEN/GI:  - Can have very small amount of liquids  - Protonix gtt,  - Replace lytes PRN  - Continue TPN    Endo:  - Endocrine following, appreciate assistance  - Accuchecks  - Insulin ssi    Dispo:  - Continue ICU care.     Date of service:  08/23/2017     Andres Uriarte MD       Cardiac Surgery Attending E and M (VAD) Note along with VAD Interrogation    I have seen and examined the patient and agree with the findings above    I also reviewed the patients clinical course and:  [x]  Hemodynamic & Respiratory paramters  [x]  Laboratory Data  [x]  Radiological studies     VAD Interrogated [x]      VAD Function is normal. Changes made []  None [x]        Interrogation of Ventricular assist device was performed with physician analysis of device parameters and review of device function. I have personally reviewed the interrogation findings and agree with findings as stated

## 2017-08-23 NOTE — PT/OT/SLP PROGRESS
Speech Language Pathology  Treatment    Suman Hayden   MRN: [id number]   [id number] A     Admitting Diagnosis: LVAD (left ventricular assist device) present    Diet recommendations:  Solid Diet Level: Dental Soft   Liquid Diet Level: Thin   · Feed only when awake/alert,   · HOB to 90 degrees  · Meds whole 1 at a time    SLP Treatment Date: 08/23/17  Speech Start Time: 0950     Speech Stop Time: 1004     Speech Total (min): 14 min       TREATMENT BILLABLE MINUTES:  Treatment Swallowing Dysfunction 14    Has the patient been evaluated by SLP for swallowing? : Yes  Keep patient NPO?: No   General Precautions: Standard, aspiration, fall, LVAD, NPO, sternal, contact  Current Respiratory Status: nasal cannula       Subjective:  Pt required max encouragement to participate and accept po trials    Pain/Comfort  Pain Rating 1: 0/10    Objective:   Patient found with: blood pressure cuff, telemetry, oxygen, pulse ox (continuous), delgado catheter (lvad)  Pt seen sitting upright in bedside chair with wife present. Pt required max encouragement to accept po trials. Pt's wife called pt's nephew to assist with encouragement. Pt assessed with chocolate pudding via tsp x5, 1/4 abe cracker presented in two pieces, and thin liquids via cyclical straw sips as liquid wash. Oral phase c/b mild generalized oral residue of solids with mildly prolonged mastication likely related to edentulous. Liquid wash assisted with oral clearance. Pharyngeal phase c/b no overt s/s of aspiration. Pt denied bolus sensation. Recommend advance diet to dental soft with thin liquids. Recommendations d/w pt and pt's wife    Assessment:  Suman Hayden is a 67 y.o. male with a medical diagnosis of LVAD (left ventricular assist device) present and presents with dysphagia.    Discharge recommendations: Discharge Facility/Level Of Care Needs: rehabilitation facility     Goals:    SLP Goals        Problem: SLP Goal    Goal Priority Disciplines Outcome   SLP Goal     SLP  Ongoing (interventions implemented as appropriate)   Description:  Speech Language Pathology Goals  Goals expected to be met by 8/28  1. Pt will tolerate thin liquids with no overt s/s of aspiration.   2. Pt will tolerate dental soft diet with adequate a-p transit and no overt s/s of aspiration                    Multidisciplinary Problems (Resolved)        Problem: SLP Goal    Goal Priority Disciplines Outcome   SLP Goal   (Resolved)     SLP Outcome(s) achieved   Description:  Speech Language Pathology Goals  Goals expected to be met by 8/4:  1. Pt will tolerate a dental soft diet and thin liquids without s/s of aspiration.                          Plan:   Patient to be seen Therapy Frequency: 5 x/week   Plan of Care expires: 09/12/17  Plan of Care reviewed with: patient, spouse  SLP Follow-up?: Yes       SHERRI Hart, CCC-SLP, CLC  Speech Language Pathologist  Certified Lactation Counselor  [phone]  8/23/2017

## 2017-08-23 NOTE — PT/OT/SLP PROGRESS
Physical Therapy  Co-Treatment with OT    Suman Hayden   MRN: [id number]   Admitting Diagnosis: LVAD (left ventricular assist device) present    PT Received On: 08/23/17  PT Start Time: 0802     PT Stop Time: 0835    PT Total Time (min): 33 min       Billable Minutes:  Therapeutic Exercise 33 min    Treatment Type: Other (see comments) (co-treatment with OT)  PT/PTA: PT     PTA Visit Number: 0       General Precautions: Standard, fall, LVAD, sternal, aspiration  Orthopedic Precautions: N/A   Braces:      Do you have any cultural, spiritual, Caodaism conflicts, given your current situation?: none noted     Subjective:  Communicated with nurse prior to session.      Pain/Comfort  Pain Rating 1: 0/10  Pain Rating Post-Intervention 1: 0/10    Objective:   Patient found with: telemetry, arterial line, pulse ox (continuous), PICC line, delgado catheter (LVAD, CVP, dialysis catheter.)    Functional Mobility:  Bed Mobility:   Rolling/Turning Right: Maximum assistance (pt needed verbal cues for functional mobility with sternal precautions.)  Supine to Sit: Maximum Assistance (pt sat EOB with max assist to achieve static sitting balance and progressed to CGA.)    Transfers:  Sit <> Stand Assistance: Maximum Assistance  Sit <> Stand Assistive Device: No Assistive Device  Bed <> Chair Technique: Stand Pivot  Bed <> Chair Assistance: Maximum Assistance    Gait:   Gait Distance: not tested. pt was max assist to stand.    Therapeutic Activities and Exercises:   pt performed AAROM there exer BLE in supine x 20 reps with rest periods as needed.     AM-PAC 6 CLICK MOBILITY  How much help from another person does this patient currently need?   1 = Unable, Total/Dependent Assistance  2 = A lot, Maximum/Moderate Assistance  3 = A little, Minimum/Contact Guard/Supervision  4 = None, Modified [place]/Independent    Turning over in bed (including adjusting bedclothes, sheets and blankets)?: 2  Sitting down on and standing up from a chair  with arms (e.g., wheelchair, bedside commode, etc.): 2  Moving from lying on back to sitting on the side of the bed?: 2  Moving to and from a bed to a chair (including a wheelchair)?: 2  Need to walk in hospital room?: 1  Climbing 3-5 steps with a railing?: 1  Total Score: 10    AM-PAC Raw Score CMS G-Code Modifier Level of Impairment Assistance   6 % Total / Unable   7 - 9 CM 80 - 100% Maximal Assist   10 - 14 CL 60 - 80% Moderate Assist   15 - 19 CK 40 - 60% Moderate Assist   20 - 22 CJ 20 - 40% Minimal Assist   23 CI 1-20% SBA / CGA   24 CH 0% Independent/ Mod I     Patient left up in chair with all lines intact, call button in reach and wife. present.    Assessment:  Suman Hayden is a 67 y.o. male with a medical diagnosis of LVAD (left ventricular assist device) present and presents with decreased strength, mobility, transfers, balance and decreased distance ambulated. Pt would benefit from cont PT to address deficits and will need inpt rehab placement when medically stable. Pt will benefit from skilled PT 6x/wk to progress to highest functional level possible. Pt is s/p LVAD HM 3 placement 7/27, chest closure 7/28, re-intubation 8/9, extubation 8/13/17.    Rehab identified problem list/impairments: Rehab identified problem list/impairments: weakness, impaired endurance, impaired functional mobilty, gait instability, impaired balance, decreased lower extremity function    Rehab potential is good.    Activity tolerance: Good    Discharge recommendations: Discharge Facility/Level Of Care Needs: rehabilitation facility     Barriers to discharge: Barriers to Discharge: Decreased caregiver support (wife will not be able to assist pt at current functional level. )    Equipment recommendations: Equipment Needed After Discharge:  (will determine DME needs closer to discharge.)     GOALS:    Physical Therapy Goals        Problem: Physical Therapy Goal    Goal Priority Disciplines Outcome Goal Variances  Interventions   Physical Therapy Goal     PT/OT, PT Ongoing (interventions implemented as appropriate)     Description:  Goals to be met by: 17     Patient will increase functional independence with mobility by performin. Supine to sit with MInimal Assistance- not met  2. Sit to supine with MInimal Assistance - not met  3. Sit to stand transfer with Minimal Assistance- not met  4. Bed to chair transfer with Minimal Assistance- not met  5. Gait  x 50 feet with Minimal Assistance. - not met  6. Lower extremity exercise program x15 reps, with supervision, in order to increase LE strength and (I) with functional mobility. - not met                        PLAN:    Patient to be seen 6 x/week  to address the above listed problems via gait training, therapeutic activities, therapeutic exercises  Plan of Care expires: 17  Plan of Care reviewed with: patient, spouse         Astridmichael Whaley, PT  2017

## 2017-08-23 NOTE — PROGRESS NOTES
Study: [employer] 3 CAP  Sponsor: [employer]  Follow-up Visit: 30 day post implant  Date of Visit: [id number]    Patient wishes to continue in study: Yes  All study protocol required CRFs completed: Yes    Mr. Hayden is here for the 1 month follow up visit. Current list of medications obtained and verified; he is still currently admitted to the hospital following LVAD. All questions answered to his satisfaction and he will contact research staff if he has any questions or concerns. Next follow up visit is in 2 months.     The following tests and procedures are related to research study:  Labs, chest x ray, Echo, physician exam

## 2017-08-23 NOTE — PLAN OF CARE
Problem: Occupational Therapy Goal  Goal: Occupational Therapy Goal  Goals to be met by:  2 weeks 8/28/17    Patient will increase functional independence with ADLs by performing:  Feeding: Independent   UE Dressing with Supervision.  LE Dressing with Supervision.  Grooming while standing with Supervision.  Toileting from toilet with Supervision for hygiene and clothing management.   Stand pivot transfers with Supervision.  Toilet transfer to toilet with Supervision.  Pt will be supervision  with LVAD yasmin't.       Goals remain appropriate.

## 2017-08-23 NOTE — PLAN OF CARE
Problem: Patient Care Overview  Goal: Plan of Care Review  Outcome: Ongoing (interventions implemented as appropriate)  No acute events overnight.  AAOx4 and VSS.  Gtts include: heparin at 400units/hr, dobutamine at 5mcg/kg/min, TPN at 50mL/hr, lipids at 20.8mL/hr, and protonix at 8mg/hr.  One large BM overnight.  Pt turned q2h.  Pt remains free from falls and new skin breakdown. Plan of care reviewed with pt and wife. Will continue to monitor.

## 2017-08-23 NOTE — PROGRESS NOTES
No acute events throughout shift. All VSS and pt AAOx4. Gtts: heparin at 500units/hr, dobutamine at 5mcg/kg/min, TPN @ 50ml/hr, and protonix at 8mg/hr. No Issues with VAD. Pt had one bowel movement. PT worked with patient and pt stayed up in chair for about 4-5 hours today. Central line and VAD dressings changed; both sites CDI. Pt remains free from falls and no new skin breakdown noted. Sacral dressing changed and site assessed. UOP adequate. Plan of care reviewed with pt and wife and all questions answered. Will continue to monitor.

## 2017-08-23 NOTE — SUBJECTIVE & OBJECTIVE
Interval History: Patient sitting in bed watching tv.  Has no complaints this morning.  Continues to have good urine output.  Wife at bedside.     Continuous Infusions:   DOBUTamine 5 mcg/kg/min (08/23/17 1400)    epinephrine infusion Stopped (08/15/17 1500)    heparin (porcine) in 5 % dex 500 Units/hr (08/23/17 1400)    pantoprazole 40 mg in dextrose 5 % 100 mL infusion (ready to mix system) 8 mg/hr (08/23/17 1400)    TPN ADULT CENTRAL LINE CUSTOM 50 mL/hr at 08/23/17 1400    TPN ADULT CENTRAL LINE CUSTOM       Scheduled Meds:   amlodipine  2.5 mg Oral Daily    ertapenem (INVANZ) IVPB  0.5 g Intravenous Q24H    fat emulsion 20%  250 mL Intravenous Daily    magnesium oxide  200 mg Oral Once    sodium chloride 0.9%  10 mL Intravenous Q6H    sodium chloride 0.9%  10 mL Intravenous Q6H    warfarin  4 mg Oral Once    warfarin  3 mg Oral Once     PRN Meds:sodium chloride, albumin human 5%, albuterol sulfate, bisacodyl, dextrose 50%, glucagon (human recombinant), insulin aspart, ondansetron, Flushing PICC Protocol **AND** sodium chloride 0.9% **AND** sodium chloride 0.9%, Flushing PICC Protocol **AND** sodium chloride 0.9% **AND** sodium chloride 0.9%    Review of patient's allergies indicates:  No Known Allergies  Objective:     Vital Signs (Most Recent):  Temp: 98.7 °F (37.1 °C) (08/23/17 1100)  Pulse: 82 (08/23/17 1400)  Resp: (!) 24 (08/23/17 1400)  BP: (!) 90/0 (08/23/17 1100)  SpO2: 100 % (08/23/17 1400) Vital Signs (24h Range):  Temp:  [98.5 °F (36.9 °C)-98.9 °F (37.2 °C)] 98.7 °F (37.1 °C)  Pulse:  [] 82  Resp:  [18-44] 24  SpO2:  [97 %-100 %] 100 %  BP: (80-90)/(0) 90/0  Arterial Line BP: ()/(60-82) 94/67     Weight: 92.6 kg (204 lb 2.3 oz)  Body mass index is 31.04 kg/m².      Intake/Output Summary (Last 24 hours) at 08/23/17 1411  Last data filed at 08/23/17 1400   Gross per 24 hour   Intake             2685 ml   Output             2245 ml   Net              440 ml     Physical Exam    Constitutional: He appears well-developed and well-nourished.   HENT:   Head: Normocephalic and atraumatic.   Eyes: EOM are normal. Pupils are equal, round, and reactive to light.   Cardiovascular: Normal rate, regular rhythm and normal heart sounds.  Exam reveals no gallop and no friction rub.    No murmur heard.  + LVAD hum    Pulmonary/Chest: Effort normal. No respiratory distress. He has no wheezes. He has no rales.   Abdominal: Soft. Bowel sounds are normal.   Musculoskeletal: He exhibits no edema.   Neurological: He is alert.   Nursing note and vitals reviewed      Significant Labs:  CBC:    Recent Labs  Lab 08/23/17  1120   WBC 14.87*   RBC 2.57*   HGB 7.3*   HCT 22.3*      MCV 87   MCH 28.4   MCHC 32.7     BNP:    Recent Labs  Lab 08/23/17  0253   BNP 3,515*     CMP:    Recent Labs  Lab 08/23/17  1120   *   CALCIUM 8.4*   ALBUMIN 2.0*   PROT 6.2      K 3.2*   CO2 26      *   CREATININE 2.5*   ALKPHOS 239*   ALT 36   AST 40   BILITOT 1.5*      Coagulation:     Recent Labs  Lab 08/23/17  0253   INR 1.2   APTT 29.2     LDH:    Recent Labs  Lab 08/21/17  0300 08/22/17  0405 08/23/17  0253    316* 287*     Microbiology:  Microbiology Results (last 7 days)     Procedure Component Value Units Date/Time    IV catheter culture [[id number]] Collected:  08/16/17 1730    Order Status:  Completed Specimen:  Catheter Tip from Catheter Tip, PICC Updated:  08/18/17 1055     Aerobic Culture - Cath tip No growth    Blood culture [[id number]] Collected:  08/11/17 1326    Order Status:  Completed Specimen:  Blood from Peripheral, Forearm, Left Updated:  08/16/17 1812     Blood Culture, Routine No growth after 5 days.          Estimated Creatinine Clearance: 31.7 mL/min (based on Cr of 2.5).

## 2017-08-23 NOTE — ASSESSMENT & PLAN NOTE
INDIRA on CKD III  -INDIRA appears to be multifactorial, Ischemic ATN from renal hypoperfusion vs. Septic ATN.  -Baseline SCr appears to be around 1.2-1.3.  He has had multiple INDIRA's in the past, likely 2/2 to poor renal perfusion from cardiomyopathy.  -had initial insult on the day of LVAD surgery on the 27th with a spike in his SCr, but it quickly resolved and returned to baseline.  His SCr stayed stable until the 1st when it increased to 1.5 and has continued to trend up until 2.3 today.   -Signs of decreased perfusion occurred on July 31st with reported drops in LVAD Flows which resolved with decrease in lasix dose and 1 unit of PRBC infusion.     -net even volume status in past 24 hours counting insensible loses.  Received 1 dose of lasix between PRBC transfusion.  -No overt signs of uremic symptoms on exam.  BUN/SCr stable from  Yesterday.  -rise in BUN more related to pre-renal azotemia 2/2 to TPN (protein content decreased by dietician today) and likely GI bleed.    -No need for RRT.    -with BNP trending up and increased edema today to lower extremities, would recommend lasix 80 mg IV x 1 today and monitor response.

## 2017-08-23 NOTE — PROGRESS NOTES
Progress Note  Surgical Intensive Care    Admit Date: 7/18/2017  Post-operative Day: 6 Days Post-Op  Hospital Day: 37    SUBJECTIVE:     Follow-up For:  Procedure(s) (LRB):  ESOPHAGOGASTRODUODENOSCOPY (EGD) (N/A)   S/p sternotomy closure 7/28/17    Interval history: No acute events overnight. VSS. On 2L NC. TPN for nutrition. Currently on Dobutatmine 5     Scheduled Meds:   ertapenem (INVANZ) IVPB  0.5 g Intravenous Q24H    fat emulsion 20%  250 mL Intravenous Daily    magnesium oxide  200 mg Oral Once    sodium chloride 0.9%  10 mL Intravenous Q6H    sodium chloride 0.9%  10 mL Intravenous Q6H    warfarin  3 mg Oral Once     Continuous Infusions:   DOBUTamine 5 mcg/kg/min (08/23/17 0600)    epinephrine infusion Stopped (08/15/17 1500)    heparin (porcine) in 5 % dex 400 Units/hr (08/23/17 0600)    pantoprazole 40 mg in dextrose 5 % 100 mL infusion (ready to mix system) 8 mg/hr (08/23/17 0600)    TPN ADULT CENTRAL LINE CUSTOM 50 mL/hr at 08/23/17 0600     PRN Meds:.sodium chloride, albumin human 5%, albuterol sulfate, bisacodyl, dextrose 50%, glucagon (human recombinant), insulin aspart, ondansetron, Flushing PICC Protocol **AND** sodium chloride 0.9% **AND** sodium chloride 0.9%, Flushing PICC Protocol **AND** sodium chloride 0.9% **AND** sodium chloride 0.9%  Review of patient's allergies indicates:  No Known Allergies    OBJECTIVE:     Vital Signs (Most Recent)  Temp: 98.7 °F (37.1 °C) (08/23/17 0300)  Pulse: 79 (08/23/17 0600)  Resp: (!) 24 (08/23/17 0600)  BP: (!) 86/0 (08/23/17 0300)  SpO2: 100 % (08/23/17 0600)    Vital Signs Range (Last 24H):  Temp:  [98.5 °F (36.9 °C)-99.1 °F (37.3 °C)]   Pulse:  []   Resp:  [18-35]   BP: (78-92)/(0)   SpO2:  [97 %-100 %]   Arterial Line BP: ()/(57-78)     I & O (Last 24H):    Intake/Output Summary (Last 24 hours) at 08/23/17 0657  Last data filed at 08/23/17 0600   Gross per 24 hour   Intake             2685 ml   Output             2300 ml   Net               385 ml        Physical Exam    Constitutional: He appears well-developed and well-nourished. No distress. He is alert  HENT:   Head: Normocephalic and atraumatic.    Neck: Normal range of motion. Neck supple.   Cardiovascular: Intact distal pulses.    LVAD hum present.   Pulmonary/Chest: Effort normal. No respiratory distress..   Abdominal: Soft. He exhibits no distension.   Skin: Skin is warm and dry.     Laboratory (Last 24H):  CBC:    Recent Labs  Lab 08/23/17  0253   WBC 15.67*   HGB 7.6*   HCT 22.9*        CMP:    Recent Labs  Lab 08/23/17  0253   CALCIUM 8.0*   ALBUMIN 1.8*   PROT 6.0      K 3.1*   CO2 20*      *   CREATININE 2.5*   ALKPHOS 239*   ALT 37   AST 40   BILITOT 1.5*     Echo  CONCLUSIONS     1 - Heartmate III LVAD; speed 5100, aortic valve opens intermittently, septum is midline.     2 - Severe left atrial enlargement.     3 - Mild left ventricular enlargement.     4 - Severely depressed left ventricular systolic function (EF 10-15%).     5 - Segmental wall motion abnormalities.     6 - Mildly depressed right ventricular systolic function .     7 - Mild tricuspid regurgitation.     ASSESSMENT/PLAN:     Neuro:  - alert and responding to commands  - sedation off    Resp:  - stable on 2L NC  - Possible aspiration event causing sepsis - resp cultures growing ESBL   - wean supplemental O2 as tolerated     CV:  - HDS; LVAD numbers stable  - Dobutatmine 5, mgmt per CTS  - epi and levo off  - Hold  given GI bleed    Heme:  - Hgb/Hct trended down to 6.1/10.5  - Continue to trend labs  - INR down to 1.8  - Heparin 400u/hr    ID:  - afebrile  - WBC 18.68  - Likely aspiration pneumonia   - bacteremic and resp cultures ESBL+, on ertapenem  - ID consulted  - blood cx 8/11 NGTD      Renal:  - Singer in place  - -325 cc/hr  - Strict I/Os   - 80mg Lasix BID per nephrology.  - BUN/Cr trending down 102/2.4  - Tolerated HD well    FEN/GI:  - Currently on Protonix  drip; Consider transitioning to BID  - Replace lytes PRN     Endo:  - Endocrine following  - Accuchecks  - SSI     Dispo:  - wean drips as tolerated per CTS    Avery Crystal  CA2  P: [phone]

## 2017-08-23 NOTE — PLAN OF CARE
Problem: Patient Care Overview  Goal: Plan of Care Review  Outcome: Ongoing (interventions implemented as appropriate)  Plan of care reviewed with patient and patient's spouse.  Remained on 2L nasal cannula throughout the day with 02 sats 100%.  OOB to chair x 6 hours today, then sat on edge of bed this evening.  1 unit PRBC given for decreased hgb/hct, noted increase on following labs.  40 mg lasix given with PRBC.  Dobutamine, heparin, TPN, and protonx gtts continued.  LVAD dressing change preformed by patient's spouse with RN supervision.  VAD functioning WNL, alarms checked per protocol.  2 BMs today.  Urine output remains adequate.  VSS throughout the day, see flowsheet for full assessment.

## 2017-08-23 NOTE — PLAN OF CARE
Problem: SLP Goal  Goal: SLP Goal  Speech Language Pathology Goals  Goals expected to be met by 8/28  1. Pt will tolerate thin liquids with no overt s/s of aspiration.   2. Pt will tolerate dental soft diet with adequate a-p transit and no overt s/s of aspiration        Outcome: Ongoing (interventions implemented as appropriate)  Continue current plan of care. Goals remain appropriate. SHERRI Lofton, CCC-SLP, [facility] 8/23/2017

## 2017-08-23 NOTE — PROGRESS NOTES
Study: Momentum 3 CAP  Sponsor: [employer]  Study Visit: 30 Day Post Surgery  Date of Visit: 23 Aug 2017    Patient was seen today for his 30-day post VAD lab collection visit for the Momentum 3 Biobank and AVWS Substudies. All blood and urine were collected and processed per protocol.

## 2017-08-23 NOTE — PROCEDURES
TXP LVAD INTERROGATIONS 8/23/2017 8/23/2017 8/23/2017 8/23/2017 8/23/2017 8/23/2017 8/23/2017   Type HeartMate3 HeartMate3 HeartMate3 HeartMate3 HeartMate3 HeartMate3 HeartMate3   Flow 3.7 4.1 3.8 3.9 4.2 3.9 3.9   Speed 5200 5200 5200 5200 5200 5200 5200   PI 5.8 4.0 4.5 4.8 4.0 4.8 4.8   Power ([place]) 3.7 3.5 3.6 3.6 3.6 3.6 3.6   LSL 4800 4800 4800 4800 4800 4800 4800   Pulsatility Intermittent pulse Intermittent pulse Intermittent pulse Intermittent pulse Intermittent pulse Intermittent pulse Intermittent pulse   Some recent data might be hidden     Pt awake, not very engaged.  Wife AAAO.  Some VAD education provided to both.  Spent 40 minutes at the bedside.   Will continue VAD education tomorrow morning.    HCT - 22.9  VAD sounds HM 3   No alarms noted  Slightly pulsatile

## 2017-08-24 PROBLEM — E87.8 ELECTROLYTE ABNORMALITY: Status: ACTIVE | Noted: 2017-08-24

## 2017-08-24 LAB
ALBUMIN SERPL BCP-MCNC: 1.8 G/DL
ALBUMIN SERPL BCP-MCNC: 1.9 G/DL
ALBUMIN SERPL BCP-MCNC: 2 G/DL
ALBUMIN SERPL BCP-MCNC: 2 G/DL
ALP SERPL-CCNC: 200 U/L
ALP SERPL-CCNC: 201 U/L
ALP SERPL-CCNC: 204 U/L
ALP SERPL-CCNC: 209 U/L
ALP SERPL-CCNC: 211 U/L
ALP SERPL-CCNC: 216 U/L
ALT SERPL W/O P-5'-P-CCNC: 28 U/L
ALT SERPL W/O P-5'-P-CCNC: 29 U/L
ALT SERPL W/O P-5'-P-CCNC: 33 U/L
ANION GAP SERPL CALC-SCNC: 10 MMOL/L
ANION GAP SERPL CALC-SCNC: 11 MMOL/L
ANION GAP SERPL CALC-SCNC: 13 MMOL/L
ANISOCYTOSIS BLD QL SMEAR: SLIGHT
APTT BLDCRRT: 29.6 SEC
AST SERPL-CCNC: 31 U/L
AST SERPL-CCNC: 32 U/L
AST SERPL-CCNC: 33 U/L
AST SERPL-CCNC: 33 U/L
AST SERPL-CCNC: 34 U/L
AST SERPL-CCNC: 34 U/L
BASO STIPL BLD QL SMEAR: ABNORMAL
BASOPHILS # BLD AUTO: 0.03 K/UL
BASOPHILS NFR BLD: 0.2 %
BILIRUB SERPL-MCNC: 1.5 MG/DL
BILIRUB SERPL-MCNC: 1.5 MG/DL
BILIRUB SERPL-MCNC: 1.6 MG/DL
BILIRUB SERPL-MCNC: 1.7 MG/DL
BUN SERPL-MCNC: 100 MG/DL
BUN SERPL-MCNC: 102 MG/DL
BUN SERPL-MCNC: 95 MG/DL
BUN SERPL-MCNC: 95 MG/DL
BUN SERPL-MCNC: 96 MG/DL
BUN SERPL-MCNC: 98 MG/DL
BUN SERPL-MCNC: 98 MG/DL
CALCIUM SERPL-MCNC: 7.9 MG/DL
CALCIUM SERPL-MCNC: 8 MG/DL
CALCIUM SERPL-MCNC: 8 MG/DL
CALCIUM SERPL-MCNC: 8.1 MG/DL
CALCIUM SERPL-MCNC: 8.1 MG/DL
CALCIUM SERPL-MCNC: 8.3 MG/DL
CALCIUM SERPL-MCNC: 8.4 MG/DL
CHLORIDE SERPL-SCNC: 107 MMOL/L
CHLORIDE SERPL-SCNC: 108 MMOL/L
CHLORIDE SERPL-SCNC: 108 MMOL/L
CHLORIDE SERPL-SCNC: 109 MMOL/L
CHLORIDE SERPL-SCNC: 110 MMOL/L
CO2 SERPL-SCNC: 21 MMOL/L
CO2 SERPL-SCNC: 23 MMOL/L
CO2 SERPL-SCNC: 24 MMOL/L
CO2 SERPL-SCNC: 24 MMOL/L
CO2 SERPL-SCNC: 25 MMOL/L
CREAT SERPL-MCNC: 2.2 MG/DL
CREAT SERPL-MCNC: 2.3 MG/DL
CREAT SERPL-MCNC: 2.3 MG/DL
DIFFERENTIAL METHOD: ABNORMAL
EOSINOPHIL # BLD AUTO: 0.2 K/UL
EOSINOPHIL # BLD AUTO: 0.3 K/UL
EOSINOPHIL NFR BLD: 1.6 %
EOSINOPHIL NFR BLD: 1.7 %
EOSINOPHIL NFR BLD: 1.8 %
EOSINOPHIL NFR BLD: 2.2 %
EOSINOPHIL NFR BLD: 2.2 %
ERYTHROCYTE [DISTWIDTH] IN BLOOD BY AUTOMATED COUNT: 17.2 %
ERYTHROCYTE [DISTWIDTH] IN BLOOD BY AUTOMATED COUNT: 17.4 %
ERYTHROCYTE [DISTWIDTH] IN BLOOD BY AUTOMATED COUNT: 17.4 %
ERYTHROCYTE [DISTWIDTH] IN BLOOD BY AUTOMATED COUNT: 17.5 %
ERYTHROCYTE [DISTWIDTH] IN BLOOD BY AUTOMATED COUNT: 17.5 %
EST. GFR  (AFRICAN AMERICAN): 32.7 ML/MIN/1.73 M^2
EST. GFR  (AFRICAN AMERICAN): 32.7 ML/MIN/1.73 M^2
EST. GFR  (AFRICAN AMERICAN): 34.6 ML/MIN/1.73 M^2
EST. GFR  (NON AFRICAN AMERICAN): 28.3 ML/MIN/1.73 M^2
EST. GFR  (NON AFRICAN AMERICAN): 28.3 ML/MIN/1.73 M^2
EST. GFR  (NON AFRICAN AMERICAN): 29.9 ML/MIN/1.73 M^2
GIANT PLATELETS BLD QL SMEAR: PRESENT
GLUCOSE SERPL-MCNC: 101 MG/DL
GLUCOSE SERPL-MCNC: 105 MG/DL
GLUCOSE SERPL-MCNC: 107 MG/DL
GLUCOSE SERPL-MCNC: 107 MG/DL
GLUCOSE SERPL-MCNC: 113 MG/DL
GLUCOSE SERPL-MCNC: 114 MG/DL
GLUCOSE SERPL-MCNC: 116 MG/DL
HCT VFR BLD AUTO: 21.5 %
HCT VFR BLD AUTO: 21.6 %
HCT VFR BLD AUTO: 21.6 %
HCT VFR BLD AUTO: 22.5 %
HCT VFR BLD AUTO: 23.2 %
HDLC SERPL-MCNC: 66 MG/DL
HGB BLD-MCNC: 7.1 G/DL
HGB BLD-MCNC: 7.1 G/DL
HGB BLD-MCNC: 7.2 G/DL
HGB BLD-MCNC: 7.2 G/DL
HGB BLD-MCNC: 7.6 G/DL
HYPOCHROMIA BLD QL SMEAR: ABNORMAL
HYPOCHROMIA BLD QL SMEAR: ABNORMAL
INR PPP: 1.3
LDH SERPL L TO P-CCNC: 270 U/L
LYMPHOCYTES # BLD AUTO: 1.2 K/UL
LYMPHOCYTES # BLD AUTO: 1.6 K/UL
LYMPHOCYTES NFR BLD: 10.3 %
LYMPHOCYTES NFR BLD: 10.3 %
LYMPHOCYTES NFR BLD: 10.4 %
LYMPHOCYTES NFR BLD: 10.6 %
LYMPHOCYTES NFR BLD: 9.4 %
MAGNESIUM SERPL-MCNC: 1.9 MG/DL
MCH RBC QN AUTO: 27.8 PG
MCH RBC QN AUTO: 28.1 PG
MCH RBC QN AUTO: 28.2 PG
MCHC RBC AUTO-ENTMCNC: 32 G/DL
MCHC RBC AUTO-ENTMCNC: 32.8 G/DL
MCHC RBC AUTO-ENTMCNC: 32.9 G/DL
MCHC RBC AUTO-ENTMCNC: 32.9 G/DL
MCHC RBC AUTO-ENTMCNC: 33.5 G/DL
MCV RBC AUTO: 84 FL
MCV RBC AUTO: 85 FL
MCV RBC AUTO: 85 FL
MCV RBC AUTO: 86 FL
MCV RBC AUTO: 87 FL
MONOCYTES # BLD AUTO: 0.8 K/UL
MONOCYTES # BLD AUTO: 0.9 K/UL
MONOCYTES # BLD AUTO: 0.9 K/UL
MONOCYTES # BLD AUTO: 1 K/UL
MONOCYTES # BLD AUTO: 1 K/UL
MONOCYTES NFR BLD: 5.2 %
MONOCYTES NFR BLD: 6.2 %
MONOCYTES NFR BLD: 6.8 %
MONOCYTES NFR BLD: 6.8 %
MONOCYTES NFR BLD: 7 %
NEUTROPHILS # BLD AUTO: 10.3 K/UL
NEUTROPHILS # BLD AUTO: 12.1 K/UL
NEUTROPHILS # BLD AUTO: 12.5 K/UL
NEUTROPHILS NFR BLD: 80.5 %
NEUTROPHILS NFR BLD: 80.5 %
NEUTROPHILS NFR BLD: 80.7 %
NEUTROPHILS NFR BLD: 81.3 %
NEUTROPHILS NFR BLD: 82.6 %
PHOSPHATE SERPL-MCNC: 4 MG/DL
PLATELET # BLD AUTO: 245 K/UL
PLATELET # BLD AUTO: 245 K/UL
PLATELET # BLD AUTO: 247 K/UL
PLATELET # BLD AUTO: 250 K/UL
PLATELET # BLD AUTO: 253 K/UL
PLATELET BLD QL SMEAR: ABNORMAL
PMV BLD AUTO: 12.2 FL
PMV BLD AUTO: 12.6 FL
PMV BLD AUTO: 12.7 FL
POCT GLUCOSE: 101 MG/DL (ref 70–110)
POCT GLUCOSE: 106 MG/DL (ref 70–110)
POCT GLUCOSE: 109 MG/DL (ref 70–110)
POCT GLUCOSE: 110 MG/DL (ref 70–110)
POCT GLUCOSE: 112 MG/DL (ref 70–110)
POCT GLUCOSE: 112 MG/DL (ref 70–110)
POCT GLUCOSE: 115 MG/DL (ref 70–110)
POCT GLUCOSE: 116 MG/DL (ref 70–110)
POCT GLUCOSE: 116 MG/DL (ref 70–110)
POCT GLUCOSE: 117 MG/DL (ref 70–110)
POCT GLUCOSE: 118 MG/DL (ref 70–110)
POCT GLUCOSE: 118 MG/DL (ref 70–110)
POCT GLUCOSE: 128 MG/DL (ref 70–110)
POCT GLUCOSE: 129 MG/DL (ref 70–110)
POCT GLUCOSE: 131 MG/DL (ref 70–110)
POCT GLUCOSE: 133 MG/DL (ref 70–110)
POCT GLUCOSE: 69 MG/DL (ref 70–110)
POCT GLUCOSE: 93 MG/DL (ref 70–110)
POLYCHROMASIA BLD QL SMEAR: ABNORMAL
POTASSIUM SERPL-SCNC: 2.9 MMOL/L
POTASSIUM SERPL-SCNC: 3.1 MMOL/L
POTASSIUM SERPL-SCNC: 3.5 MMOL/L
POTASSIUM SERPL-SCNC: 3.8 MMOL/L
POTASSIUM SERPL-SCNC: 3.9 MMOL/L
PROT SERPL-MCNC: 5.9 G/DL
PROT SERPL-MCNC: 6 G/DL
PROT SERPL-MCNC: 6.1 G/DL
PROT SERPL-MCNC: 6.4 G/DL
PROTHROMBIN TIME: 13.3 SEC
RBC # BLD AUTO: 2.55 M/UL
RBC # BLD AUTO: 2.59 M/UL
RBC # BLD AUTO: 2.7 M/UL
SODIUM SERPL-SCNC: 142 MMOL/L
SODIUM SERPL-SCNC: 143 MMOL/L
SODIUM SERPL-SCNC: 144 MMOL/L
URATE SERPL-MCNC: 5.9 MG/DL
WBC # BLD AUTO: 12.8 K/UL
WBC # BLD AUTO: 14.95 K/UL
WBC # BLD AUTO: 15.19 K/UL
WBC # BLD AUTO: 15.19 K/UL
WBC # BLD AUTO: 15.32 K/UL

## 2017-08-24 PROCEDURE — 93750 INTERROGATION VAD IN PERSON: CPT | Mod: ,,, | Performed by: THORACIC SURGERY (CARDIOTHORACIC VASCULAR SURGERY)

## 2017-08-24 PROCEDURE — 63600175 PHARM REV CODE 636 W HCPCS: Performed by: THORACIC SURGERY (CARDIOTHORACIC VASCULAR SURGERY)

## 2017-08-24 PROCEDURE — 25000003 PHARM REV CODE 250: Performed by: STUDENT IN AN ORGANIZED HEALTH CARE EDUCATION/TRAINING PROGRAM

## 2017-08-24 PROCEDURE — A4216 STERILE WATER/SALINE, 10 ML: HCPCS | Performed by: THORACIC SURGERY (CARDIOTHORACIC VASCULAR SURGERY)

## 2017-08-24 PROCEDURE — 85610 PROTHROMBIN TIME: CPT

## 2017-08-24 PROCEDURE — 99232 SBSQ HOSP IP/OBS MODERATE 35: CPT | Mod: ,,, | Performed by: INTERNAL MEDICINE

## 2017-08-24 PROCEDURE — C9113 INJ PANTOPRAZOLE SODIUM, VIA: HCPCS | Performed by: THORACIC SURGERY (CARDIOTHORACIC VASCULAR SURGERY)

## 2017-08-24 PROCEDURE — 99233 SBSQ HOSP IP/OBS HIGH 50: CPT | Mod: 24,,, | Performed by: THORACIC SURGERY (CARDIOTHORACIC VASCULAR SURGERY)

## 2017-08-24 PROCEDURE — 27000248 HC VAD-ADDITIONAL DAY

## 2017-08-24 PROCEDURE — 25000003 PHARM REV CODE 250: Performed by: INTERNAL MEDICINE

## 2017-08-24 PROCEDURE — 84100 ASSAY OF PHOSPHORUS: CPT

## 2017-08-24 PROCEDURE — 82465 ASSAY BLD/SERUM CHOLESTEROL: CPT

## 2017-08-24 PROCEDURE — 63600175 PHARM REV CODE 636 W HCPCS

## 2017-08-24 PROCEDURE — 94761 N-INVAS EAR/PLS OXIMETRY MLT: CPT

## 2017-08-24 PROCEDURE — 63600175 PHARM REV CODE 636 W HCPCS: Performed by: STUDENT IN AN ORGANIZED HEALTH CARE EDUCATION/TRAINING PROGRAM

## 2017-08-24 PROCEDURE — 25000003 PHARM REV CODE 250: Performed by: THORACIC SURGERY (CARDIOTHORACIC VASCULAR SURGERY)

## 2017-08-24 PROCEDURE — 83735 ASSAY OF MAGNESIUM: CPT

## 2017-08-24 PROCEDURE — 63600175 PHARM REV CODE 636 W HCPCS: Performed by: INTERNAL MEDICINE

## 2017-08-24 PROCEDURE — 97530 THERAPEUTIC ACTIVITIES: CPT

## 2017-08-24 PROCEDURE — 97535 SELF CARE MNGMENT TRAINING: CPT

## 2017-08-24 PROCEDURE — 80053 COMPREHEN METABOLIC PANEL: CPT | Mod: 91

## 2017-08-24 PROCEDURE — 85025 COMPLETE CBC W/AUTO DIFF WBC: CPT | Mod: 91

## 2017-08-24 PROCEDURE — 63600175 PHARM REV CODE 636 W HCPCS: Performed by: GENERAL PRACTICE

## 2017-08-24 PROCEDURE — 25000003 PHARM REV CODE 250: Performed by: GENERAL PRACTICE

## 2017-08-24 PROCEDURE — 93750 INTERROGATION VAD IN PERSON: CPT | Performed by: STUDENT IN AN ORGANIZED HEALTH CARE EDUCATION/TRAINING PROGRAM

## 2017-08-24 PROCEDURE — 99232 SBSQ HOSP IP/OBS MODERATE 35: CPT | Mod: GC,,, | Performed by: ANESTHESIOLOGY

## 2017-08-24 PROCEDURE — 85730 THROMBOPLASTIN TIME PARTIAL: CPT

## 2017-08-24 PROCEDURE — B4185 PARENTERAL SOL 10 GM LIPIDS: HCPCS | Performed by: STUDENT IN AN ORGANIZED HEALTH CARE EDUCATION/TRAINING PROGRAM

## 2017-08-24 PROCEDURE — 84550 ASSAY OF BLOOD/URIC ACID: CPT

## 2017-08-24 PROCEDURE — 20000000 HC ICU ROOM

## 2017-08-24 PROCEDURE — 83615 LACTATE (LD) (LDH) ENZYME: CPT

## 2017-08-24 PROCEDURE — 99233 SBSQ HOSP IP/OBS HIGH 50: CPT | Mod: 25,,, | Performed by: INTERNAL MEDICINE

## 2017-08-24 PROCEDURE — C9113 INJ PANTOPRAZOLE SODIUM, VIA: HCPCS | Performed by: STUDENT IN AN ORGANIZED HEALTH CARE EDUCATION/TRAINING PROGRAM

## 2017-08-24 RX ORDER — POTASSIUM CHLORIDE 29.8 MG/ML
40 INJECTION INTRAVENOUS ONCE
Status: COMPLETED | OUTPATIENT
Start: 2017-08-24 | End: 2017-08-24

## 2017-08-24 RX ORDER — HYDRALAZINE HYDROCHLORIDE 20 MG/ML
10 INJECTION INTRAMUSCULAR; INTRAVENOUS EVERY 6 HOURS PRN
Status: DISCONTINUED | OUTPATIENT
Start: 2017-08-24 | End: 2017-09-15

## 2017-08-24 RX ORDER — WARFARIN 2 MG/1
4 TABLET ORAL ONCE
Status: COMPLETED | OUTPATIENT
Start: 2017-08-24 | End: 2017-08-24

## 2017-08-24 RX ORDER — POTASSIUM CHLORIDE 20 MEQ/1
40 TABLET, EXTENDED RELEASE ORAL ONCE
Status: COMPLETED | OUTPATIENT
Start: 2017-08-24 | End: 2017-08-24

## 2017-08-24 RX ORDER — FUROSEMIDE 10 MG/ML
10 INJECTION INTRAMUSCULAR; INTRAVENOUS ONCE
Status: COMPLETED | OUTPATIENT
Start: 2017-08-24 | End: 2017-08-24

## 2017-08-24 RX ADMIN — WARFARIN SODIUM 4 MG: 2 TABLET ORAL at 05:08

## 2017-08-24 RX ADMIN — PANTOPRAZOLE SODIUM 8 MG/HR: 40 INJECTION, POWDER, FOR SOLUTION INTRAVENOUS at 12:08

## 2017-08-24 RX ADMIN — SOYBEAN OIL 250 ML: 20 INJECTION, SOLUTION INTRAVENOUS at 09:08

## 2017-08-24 RX ADMIN — HEPARIN SODIUM AND DEXTROSE 500 UNITS/HR: 10000; 5 INJECTION INTRAVENOUS at 09:08

## 2017-08-24 RX ADMIN — Medication 10 ML: at 12:08

## 2017-08-24 RX ADMIN — POTASSIUM CHLORIDE 40 MEQ: 400 INJECTION, SOLUTION INTRAVENOUS at 08:08

## 2017-08-24 RX ADMIN — POTASSIUM CHLORIDE 40 MEQ: 1500 TABLET, EXTENDED RELEASE ORAL at 03:08

## 2017-08-24 RX ADMIN — HYDRALAZINE HYDROCHLORIDE 10 MG: 20 INJECTION INTRAMUSCULAR; INTRAVENOUS at 06:08

## 2017-08-24 RX ADMIN — FUROSEMIDE 10 MG/HR: 10 INJECTION, SOLUTION INTRAMUSCULAR; INTRAVENOUS at 10:08

## 2017-08-24 RX ADMIN — SODIUM CHLORIDE 0.5 G: 9 INJECTION, SOLUTION INTRAVENOUS at 08:08

## 2017-08-24 RX ADMIN — DOBUTAMINE IN DEXTROSE 5 MCG/KG/MIN: 400 INJECTION, SOLUTION INTRAVENOUS at 09:08

## 2017-08-24 RX ADMIN — AMLODIPINE BESYLATE 2.5 MG: 2.5 TABLET ORAL at 09:08

## 2017-08-24 RX ADMIN — PANTOPRAZOLE SODIUM 4 MG/HR: 40 INJECTION, POWDER, FOR SOLUTION INTRAVENOUS at 12:08

## 2017-08-24 RX ADMIN — Medication 10 ML: at 05:08

## 2017-08-24 RX ADMIN — PANTOPRAZOLE SODIUM 4 MG/HR: 40 INJECTION, POWDER, FOR SOLUTION INTRAVENOUS at 11:08

## 2017-08-24 RX ADMIN — HYDRALAZINE HYDROCHLORIDE 10 MG: 20 INJECTION INTRAMUSCULAR; INTRAVENOUS at 12:08

## 2017-08-24 RX ADMIN — CALCIUM GLUCONATE: 94 INJECTION, SOLUTION INTRAVENOUS at 09:08

## 2017-08-24 RX ADMIN — POTASSIUM CHLORIDE 40 MEQ: 400 INJECTION, SOLUTION INTRAVENOUS at 09:08

## 2017-08-24 RX ADMIN — FUROSEMIDE 10 MG: 10 INJECTION, SOLUTION INTRAVENOUS at 10:08

## 2017-08-24 NOTE — SUBJECTIVE & OBJECTIVE
Interval History:   Kidney function improving with adequate UOP.  Net even 24/h period.  No complaints this morning.  Hypokalemic, being replaced.    Review of patient's allergies indicates:  No Known Allergies  Current Facility-Administered Medications   Medication Frequency    0.9%  NaCl infusion (for blood administration) Q24H PRN    albumin human 5% bottle 500 mL PRN    albuterol nebulizer solution 2.5 mg Q4H PRN    amlodipine tablet 2.5 mg Daily    bisacodyl suppository 10 mg Daily PRN    dextrose 50% injection 12.5 g PRN    DOBUtamine 1000 mg in D5W 250 mL infusion (premix non-titrating) Continuous    EPINEPHrine (ADRENALIN) 4 mg in sodium chloride 0.9% 250 mL infusion Continuous    ertapenem (INVANZ) 0.5 g in sodium chloride 0.9% 50 mL IVPB Q24H    fat emulsion 20% infusion 250 mL Daily    furosemide (LASIX) 500 mg in sodium chloride 0.9% 100 mL infusion Continuous    glucagon (human recombinant) injection 1 mg PRN    heparin 25,000 units in dextrose 5% 250 mL (100 units/mL) infusion (heparin infusion) Continuous    hydrALAZINE injection 10 mg Q6H PRN    insulin aspart pen 0-5 Units Q4H PRN    ondansetron injection 4 mg Q6H PRN    pantoprazole 40 mg in dextrose 5 % 100 mL infusion (ready to mix system) Continuous    sodium chloride 0.9% flush 10 mL Q6H    And    sodium chloride 0.9% flush 10 mL PRN    sodium chloride 0.9% flush 10 mL Q6H    And    sodium chloride 0.9% flush 10 mL PRN    TPN ADULT CENTRAL LINE CUSTOM Continuous    TPN ADULT CENTRAL LINE CUSTOM Continuous    warfarin (COUMADIN) tablet 4 mg Once       Objective:     Vital Signs (Most Recent):  Temp: 98.5 °F (36.9 °C) (08/24/17 1500)  Pulse: 80 (08/24/17 1515)  Resp: (!) 33 (08/24/17 1515)  BP: (!) 88/0 (08/24/17 1500)  SpO2: (!) 94 % (08/24/17 1500)  O2 Device (Oxygen Therapy): room air (08/24/17 1500) Vital Signs (24h Range):  Temp:  [97.8 °F (36.6 °C)-98.9 °F (37.2 °C)] 98.5 °F (36.9 °C)  Pulse:  [] 80  Resp:   [19-44] 33  SpO2:  [92 %-100 %] 94 %  BP: ()/(0) 88/0  Arterial Line BP: ()/(57-80) 92/64     Weight: 90.1 kg (198 lb 10.2 oz) (08/24/17 0500)  Body mass index is 30.2 kg/m².  Body surface area is 2.08 meters squared.    I/O last 3 completed shifts:  In: 3443 [I.V.:1324]  Out: 2855 [Urine:2855]    Physical Exam   Constitutional: He is oriented to person, place, and time. He appears well-developed and well-nourished. No distress.   HENT:   Head: Normocephalic and atraumatic.   Eyes: EOM are normal.   Cardiovascular: Exam reveals no gallop and no friction rub.    No murmur heard.  Smooth LVAD hum   Pulmonary/Chest: Effort normal and breath sounds normal. No respiratory distress. He has no wheezes. He has no rales.   Abdominal: Soft. There is no tenderness.   Musculoskeletal: He exhibits edema (1+ lower ext.).   Neurological: He is alert and oriented to person, place, and time.   Skin: Skin is warm and dry. No rash noted. He is not diaphoretic.   Vitals reviewed.      Significant Labs:  CBC:   Recent Labs  Lab 08/24/17  1155   WBC 14.95*   RBC 2.59*   HGB 7.2*   HCT 22.5*      MCV 87   MCH 27.8   MCHC 32.0     CMP:   Recent Labs  Lab 08/24/17  1155   *   CALCIUM 8.3*   ALBUMIN 1.9*   PROT 6.1      K 3.8   CO2 23      BUN 95*   CREATININE 2.3*   ALKPHOS 211*   ALT 28   AST 33   BILITOT 1.6*

## 2017-08-24 NOTE — PROGRESS NOTES
Dr. Downing and interdisciplinary team at . Plan of care, lines, and delgado removal discussed. Orders to keep delgado in place and to revaluate tomorrow.

## 2017-08-24 NOTE — PROGRESS NOTES
[facility]  Heart Transplant  Progress Note    Patient Name: Suman Hayden  MRN: [id number]  Admission Date: 7/18/2017  Hospital Length of Stay: 37 days  Attending Physician: Sunny Downing MD  Primary Care Provider: Joe Ernst MD  Principal Problem:LVAD (left ventricular assist device) present    Subjective:     Interval History:Pt in chair this am. Currently no complaints. Cleared to try clear liquids this am but has not tried yet    Continuous Infusions:   DOBUTamine 5 mcg/kg/min (08/24/17 1400)    epinephrine infusion Stopped (08/15/17 1500)    furosemide (LASIX) 5 mg/mL infusion (non-titrating) 10 mg/hr (08/24/17 1400)    heparin (porcine) in 5 % dex 500 Units/hr (08/24/17 1100)    pantoprazole 40 mg in dextrose 5 % 100 mL infusion (ready to mix system) 4 mg/hr (08/24/17 1400)    TPN ADULT CENTRAL LINE CUSTOM 50 mL/hr at 08/24/17 1400    TPN ADULT CENTRAL LINE CUSTOM       Scheduled Meds:   amlodipine  2.5 mg Oral Daily    ertapenem (INVANZ) IVPB  0.5 g Intravenous Q24H    fat emulsion 20%  250 mL Intravenous Daily    sodium chloride 0.9%  10 mL Intravenous Q6H    sodium chloride 0.9%  10 mL Intravenous Q6H    warfarin  4 mg Oral Once     PRN Meds:sodium chloride, albumin human 5%, albuterol sulfate, bisacodyl, dextrose 50%, glucagon (human recombinant), hydrALAZINE, insulin aspart, ondansetron, Flushing PICC Protocol **AND** sodium chloride 0.9% **AND** sodium chloride 0.9%, Flushing PICC Protocol **AND** sodium chloride 0.9% **AND** sodium chloride 0.9%    Review of patient's allergies indicates:  No Known Allergies  Objective:     Vital Signs (Most Recent):  Temp: 97.8 °F (36.6 °C) (08/24/17 1100)  Pulse: 80 (08/24/17 1400)  Resp: (!) 23 (08/24/17 1400)  BP: (!) 100/0 (08/24/17 1100)  SpO2: (!) 93 % (08/24/17 1400) Vital Signs (24h Range):  Temp:  [97.8 °F (36.6 °C)-99.2 °F (37.3 °C)] 97.8 °F (36.6 °C)  Pulse:  [] 80  Resp:  [19-44] 23  SpO2:  [92 %-100 %] 93 %  BP:  ()/(0) 100/0  Arterial Line BP: ()/(57-80) 91/63     Weight: 90.1 kg (198 lb 10.2 oz)  Body mass index is 30.2 kg/m².      Intake/Output Summary (Last 24 hours) at 08/24/17 1425  Last data filed at 08/24/17 1400   Gross per 24 hour   Intake             2328 ml   Output             2335 ml   Net               -7 ml     Physical Exam     Constitutional: He appears well-developed and well-nourished.   HENT:   Head: Normocephalic and atraumatic.   Eyes: EOM are normal. Pupils are equal, round, and reactive to light.   Cardiovascular: Normal rate, regular rhythm and normal heart sounds.  Exam reveals no gallop and no friction rub.    No murmur heard.  + LVAD hum    Pulmonary/Chest: Effort normal. No respiratory distress. He has no wheezes. He has no rales.   Abdominal: Soft. Bowel sounds are normal.   Musculoskeletal: He exhibits no edema.   Neurological: He is alert.   Nursing note and vitals reviewed      Significant Labs:  CBC:    Recent Labs  Lab 08/24/17  0445 08/24/17  1155   WBC 15.19*  15.19*  --    RBC 2.55*  2.55* 2.59*   HGB 7.1*  7.1* 7.2*   HCT 21.6*  21.6* 22.5*     245 250   MCV 85  85 87   MCH 27.8  27.8 27.8   MCHC 32.9  32.9 32.0     BNP:    Recent Labs  Lab 08/23/17  0253   BNP 3,515*     CMP:    Recent Labs  Lab 08/24/17  1155   *   CALCIUM 8.3*   ALBUMIN 1.9*   PROT 6.1      K 3.8   CO2 23      BUN 95*   CREATININE 2.3*   ALKPHOS 211*   ALT 28   AST 33   BILITOT 1.6*      Coagulation:     Recent Labs  Lab 08/24/17  0445   INR 1.3*   APTT 29.6     LDH:    Recent Labs  Lab 08/22/17  0405 08/23/17  0253 08/24/17  0445   * 287* 270*     Microbiology:  Microbiology Results (last 7 days)     Procedure Component Value Units Date/Time    IV catheter culture [[id number]] Collected:  08/16/17 1730    Order Status:  Completed Specimen:  Catheter Tip from Catheter Tip, PICC Updated:  08/18/17 1055     Aerobic Culture - Cath tip No growth          Estimated  Creatinine Clearance: 34 mL/min (based on Cr of 2.3).        Assessment and Plan:     * LVAD (left ventricular assist device) present    - LVAD HM III. Placed this admit 7/27/17  - CTS Primary  - chest closure (7/28/17) and extubated (7/29/17)  -Reintubated 8/9/17 and extubated 8/13/17  -Now on    - Speed 5200  - LDH stable  - AC per CTS          Electrolyte abnormality    -Recommending electrolyte replacement to keep K around 4 and Mg around 2        Upper GI bleed    -GI following. EGD done.         Bacteremia    -ESBL from blood culture 8/9   -Ertapenem, ID following        Atrial fibrillation    -AC, Amio per C TS          Atrial tachycardia    - [id number] - 3  -Rec restarting Amio. AC per CTS        AICD discharge    - appropriate in the setting of VT aggravated by underlying AT/AFL (earlier during the admission)  - 7/28 - setting of AF undersense - device reprogrammed to VVI 80  - tachy therapy turned off  - Rec restarting Amio  - EP planning to do lead revision         Hepatitis B core antibody positive since 2012    - will defer liver biopsy as CTS not requiring it  - ID consult cleared the patient for sx  - case discussed with hepatology, low suspicion for liver involvement        V-tach    - Rec restarting Amio        Hyperlipidemia    - Rec resuming pravastatin once liver enzymes stabilize             Susannah Elizabeth PA-C  Heart Transplant  [facility]

## 2017-08-24 NOTE — PT/OT/SLP PROGRESS
Speech Language Pathology      Suman Hayden  MRN: [id number]  [id number] A     Patient not seen today secondary to Patient unwilling to participate and refused all oral intake despite encouragement.  Will follow-up per plan of care.    SHERRI Hart, CCC-SLP, [facility], 8/24/2017

## 2017-08-24 NOTE — ASSESSMENT & PLAN NOTE
INDIRA on CKD III  -INDIRA appears to be multifactorial, Ischemic ATN from renal hypoperfusion vs. Septic ATN.  -Baseline SCr appears to be around 1.2-1.3.  He has had multiple INDIRA's in the past, likely 2/2 to poor renal perfusion from cardiomyopathy.  -had initial insult on the day of LVAD surgery on the 27th with a spike in his SCr, but it quickly resolved and returned to baseline.  His SCr stayed stable until the 1st when it increased to 1.5 and has continued to trend up until 2.3 today.   -Signs of decreased perfusion occurred on July 31st with reported drops in LVAD Flows which resolved with decrease in lasix dose and 1 unit of PRBC infusion.     -net even volume status in past 24 hours counting insensible loses.  No lasix given yesterday.  -Kidney function stable.  No signs of uremia on exam.  No need for RRT at this time.  -recommend PRN lasix for volume management.  -will continue following from afar, no further recommendations at this time.

## 2017-08-24 NOTE — PROGRESS NOTES
Daily E and M and VAD Interrogation Note    Reason for Visit:  Patient is seen in follow up for management of:  [] HeartMate II  [] Heartware [] Total artificial heart       [] ECMO           [x] Other - HM III     Interval History:  [] Interval history unobtainable due to intubation.  The [x] implant/[] explant date was 7/27/17    Events -  NAEON.  Making good urine.  Sat in chair o/n. Still not eating       Review of Systems:   Negative except as above.      Medications:  Current Facility-Administered Medications   Medication Dose Route Frequency Provider Last Rate Last Dose    0.9%  NaCl infusion (for blood administration)   Intravenous Q24H PRN Andres Uriarte MD        albumin human 5% bottle 500 mL  500 mL Intravenous PRN Shayne Espinoza MD   500 mL at 08/09/17 0700    albuterol nebulizer solution 2.5 mg  2.5 mg Nebulization Q4H PRN Shayne Espinoza MD        amlodipine tablet 2.5 mg  2.5 mg Oral Daily Andres Uriarte MD   2.5 mg at 08/24/17 0914    bisacodyl suppository 10 mg  10 mg Rectal Daily PRN Shayne Espinoza MD   10 mg at 08/06/17 2036    dextrose 50% injection 12.5 g  12.5 g Intravenous PRN Charbel Ritter MD        DOBUtamine 1000 mg in D5W 250 mL infusion (premix non-titrating)  5 mcg/kg/min (Dosing Weight) Intravenous Continuous Sunny Downing MD 6 mL/hr at 08/24/17 0916 5 mcg/kg/min at 08/24/17 0916    EPINEPHrine (ADRENALIN) 4 mg in sodium chloride 0.9% 250 mL infusion  0.01 mcg/kg/min (Dosing Weight) Intravenous Continuous Shayne Espinoza MD   Stopped at 08/15/17 1500    ertapenem (INVANZ) 0.5 g in sodium chloride 0.9% 50 mL IVPB  0.5 g Intravenous Q24H Angie Torres  mL/hr at 08/23/17 2143 0.5 g at 08/23/17 2143    fat emulsion 20% infusion 250 mL  250 mL Intravenous Daily Andres Uriarte MD 20.8 mL/hr at 08/24/17 0900 250 mL at 08/24/17 0900    fat emulsion 20% infusion 250 mL  250 mL Intravenous Daily Andres Uriarte MD         furosemide (LASIX) 500 mg in sodium chloride 0.9% 100 mL infusion  10 mg/hr Intravenous Continuous Andres Uriarte MD        furosemide injection 10 mg  10 mg Intravenous Once Andres Uriarte MD        glucagon (human recombinant) injection 1 mg  1 mg Intramuscular PRN Charbel Ritter MD        heparin 25,000 units in dextrose 5% 250 mL (100 units/mL) infusion (heparin infusion)  500 Units/hr Intravenous Continuous Andres Uriarte MD 5 mL/hr at 08/24/17 0917 500 Units/hr at 08/24/17 0917    hydrALAZINE injection 10 mg  10 mg Intravenous Q6H PRN Shlomo Serrato MD   10 mg at 08/24/17 0037    insulin aspart pen 0-5 Units  0-5 Units Subcutaneous Q4H PRN Charbel Ritter MD   2 Units at 08/10/17 0751    ondansetron injection 4 mg  4 mg Intravenous Q6H PRN Shayne Espinoza MD   4 mg at 08/08/17 2112    pantoprazole 40 mg in dextrose 5 % 100 mL infusion (ready to mix system)  8 mg/hr Intravenous Continuous Sunny Downing MD 10 mL/hr at 08/24/17 0900 4 mg/hr at 08/24/17 0900    potassium chloride 40 mEq in 100 mL IVPB (FOR CENTRAL LINE ADMINISTRATION ONLY)  40 mEq Intravenous Once Andres Uriarte MD        sodium chloride 0.9% flush 10 mL  10 mL Intravenous Q6H Sunny Downing MD   10 mL at 08/24/17 0538    And    sodium chloride 0.9% flush 10 mL  10 mL Intravenous PRN Sunny Downing MD   10 mL at 08/10/17 1151    sodium chloride 0.9% flush 10 mL  10 mL Intravenous Q6H Sunny Downing MD   10 mL at 08/24/17 0538    And    sodium chloride 0.9% flush 10 mL  10 mL Intravenous PRN Sunny Downing MD        TPN ADULT CENTRAL LINE CUSTOM   Intravenous Continuous Andres Uriarte MD 50 mL/hr at 08/24/17 0900      TPN ADULT CENTRAL LINE CUSTOM   Intravenous Continuous Andres Uriarte MD        warfarin (COUMADIN) tablet 4 mg  4 mg Oral Once Andres Urirate MD         Physical Examination:  Vital Signs:   Vitals:    08/24/17 0900   BP:    Pulse: 80   Resp: (!) 21   Temp:       Cardiovascular:  [x] Regular rate and rhythm [] Irregular []  None (MATT) []  Other  []  No edema [x]  Edema present  [x]  Clear to auscultation  []  Rales to []  Coarse  []  No rales but   [] Pedal Pulses absent  [x]  Pulses  + throughout  Skin:  Incision is [x]  Clean, dry and intact.  []  Other   Sternum:  [x]  Stable []  Unstable  Driveline(s):   [x]  Clean, dry and intact. []  Other     Labs:  BMP  Lab Results   Component Value Date     08/24/2017    K 3.1 (L) 08/24/2017     08/24/2017    CO2 21 (L) 08/24/2017    BUN 96 (H) 08/24/2017    CREATININE 2.2 (H) 08/24/2017    CALCIUM 7.9 (L) 08/24/2017    ANIONGAP 13 08/24/2017    ESTGFRAFRICA 34.6 (A) 08/24/2017    EGFRNONAA 29.9 (A) 08/24/2017     Magnesium   Date Value Ref Range Status   08/24/2017 1.9 1.6 - 2.6 mg/dL Final     Lab Results   Component Value Date    WBC 15.19 (H) 08/24/2017    WBC 15.19 (H) 08/24/2017    HGB 7.1 (L) 08/24/2017    HGB 7.1 (L) 08/24/2017    HCT 21.6 (L) 08/24/2017    HCT 21.6 (L) 08/24/2017    MCV 85 08/24/2017    MCV 85 08/24/2017     08/24/2017     08/24/2017     Lab Results   Component Value Date    INR 1.3 (H) 08/24/2017    INR 1.2 08/23/2017    INR 1.2 08/22/2017     BNP   Date Value Ref Range Status   08/23/2017 3,515 (H) 0 - 99 pg/mL Final     Comment:     Values of less than 100 pg/ml are consistent with non-CHF populations.   08/21/2017 1,804 (H) 0 - 99 pg/mL Final     Comment:     Values of less than 100 pg/ml are consistent with non-CHF populations.   08/18/2017 2,538 (H) 0 - 99 pg/mL Final     Comment:     Values of less than 100 pg/ml are consistent with non-CHF populations.     LD   Date Value Ref Range Status   08/24/2017 270 (H) 110 - 260 U/L Final     Comment:     Results are increased in hemolyzed samples.   08/23/2017 287 (H) 110 - 260 U/L Final     Comment:     Results are increased in hemolyzed samples.   08/22/2017 316 (H) 110 - 260 U/L Final     Comment:     Results are increased in  hemolyzed samples.     X-Rays:  [x]  I reviewed today's Chest x-ray    Procedure:  Device Interrogation including analysis of device parameters.  Current Settings   [x]  Ventricular Assist Device  []  Total Artificial Heart interrogated  Review of device function is [x]  Stable []  Other   TXP LVAD INTERROGATIONS 8/24/2017 8/24/2017 8/24/2017 8/24/2017 8/24/2017 8/24/2017 8/24/2017   Type HeartMate3 HeartMate3 HeartMate3 HeartMate3 HeartMate3 HeartMate3 HeartMate3   Flow 4.2 4.4 4.4 4.4 4.4 4.1 4.3   Speed 5200 5200 5200 5200 5200 5200 5200   PI 3.5 3.6 3.5 3.7 3.5 3.8 3.7   Power ([place]) 3.6 3.6 3.6 3.5 3.6 3.6 3.5   LSL - - 4800 4800 4800 4800 4800   Pulsatility Intermittent pulse Intermittent pulse Intermittent pulse Intermittent pulse Intermittent pulse Intermittent pulse Intermittent pulse   Some recent data might be hidden       Assessment:  [x]  Primary Cardiomyopathy [x]  Congestive Heart Failure   []  Atrial Fibrillation []  Ventricular Tachycardia   [x]  Aftercare cardiac device [x]  Long term (current) use of anticoagulants   []  Ventilator-associated pneumonia []  Pneumonia viral, unspecified   []  Pneumonia, bacterial, unspecified []  Pneumonia, organism unspecified   [x]  Hemorrhage of GI tract, unspecified    []  Nosebleed []  Driveline infection   []  Infection VAD device []  New onset of    []        Plan:  [x]  Interval history obtained from ICU attending team member during rounding today  [x]  VAD/MATT teaching performed with patient  [x]  Mobilization / Physical Therapy ongoing  [x]  Anticoagulation [x]  Ongoing []  Held  []  Studies ordered  []   To OR today for closure.       Total time spent was 30 minutes.  Of which more than 50 percent of the care dominated counseling and coordinating care with different team members. The VAD was interrogated and all parameters were WNL and no significant findings were found in the history. All these findings are documented in the note above.    Neuro:  -  Alert and oriented    Resp:  - Extubated  - Resp cultures with ESBL - Ertapenem per ID   - Minimize supplemental O2  - Pulmonary toilet    CV:  - HDS; LVAD numbers stable  -   - Dobutamine at 2.5  - Dopamine at 3  - Norvasc   - Hold  given GI bleed  - LVAD numbers stable      Heme:  - Hgb/Hct stable    - Continue to trend  - INR 1.2 this AM   - PICC in place  - Holding ASA 2/2 bleeding  - Coumadin - 4mg   - Heparin - PTT 40-50 today    ID:  - afebrile  - Ertapenem started for ESBL pos resp culture  - WBC 15  - PICC replaced 8/17   - Appreciate ID recs  - continue to monitor     Renal:  - Singer in place  - Strict I/Os   - Making good urine but net positive 1L yesterday  - Lasix gtt restarted at 10/hr  - BUN 98 this AM, improving    - Nephro is following.      FEN/GI:  - Clears   - Protonix gtt,  - Replace lytes PRN  - Continue TPN    Endo:  - Endocrine following, appreciate assistance  - Accuchecks  - Insulin ssi    Dispo:  - Continue ICU care.     Date of service:  08/24/2017     Andres Uriarte MD       Cardiac Surgery Attending E and M (VAD) Note along with VAD Interrogation    I have seen and examined the patient and agree with the findings above    I also reviewed the patients clinical course and:  [x]  Hemodynamic & Respiratory paramters  [x]  Laboratory Data  [x]  Radiological studies     VAD Interrogated [x]      VAD Function is normal. Changes made []  None [x]        Interrogation of Ventricular assist device was performed with physician analysis of device parameters and review of device function. I have personally reviewed the interrogation findings and agree with findings as stated

## 2017-08-24 NOTE — PROGRESS NOTES
UPDATE    SW to pt's room for update. Pt presents as aaox3 with calm affect, sitting in chair at bedside. Pt's wife not in room. Pt reports coping well at this time with no needs. Pt states he feels like he is doing really well and he is hopeful he will be able to d/c soon. SW providing psychosocial and counseling support, education, resources, and d/c planning as needed. SW continuing to follow and remains available.

## 2017-08-24 NOTE — PT/OT/SLP PROGRESS
Physical Therapy  Treatment    Suman Hayden   MRN: [id number]   Admitting Diagnosis: LVAD (left ventricular assist device) present    PT Received On: 08/24/17  PT Start Time: 0900     PT Stop Time: 0925    PT Total Time (min): 25 min       Billable Minutes:  Therapeutic Activity 25    Treatment Type: Treatment  PT/PTA: PT     PTA Visit Number: 0       General Precautions: Standard,  (fall, sternal, LVAD, aspiration)  Orthopedic Precautions: N/A   Braces:      Do you have any cultural, spiritual, Jewish conflicts, given your current situation?: none noted     Subjective:  Communicated with RN prior to session.  AMBER sharma.  RN informed.    Pain/Comfort  Pain Rating 1: 0/10    Objective:   Patient found with:  (tele, pulse ox, Bp cuff, IV, Singer, LVAD)    Functional Mobility:  Bed Mobility:   Rolling/Turning Right: Maximum assistance  Scooting/Bridging: Maximum Assistance  Supine to Sit: Maximum Assistance    Transfers:  Sit <> Stand Assistance: Maximum Assistance  Sit <> Stand Assistive Device: No Assistive Device  Bed <> Chair Technique: Stand Pivot  Bed <> Chair Assistance: Maximum Assistance  Bed <> Chair Assistive Device: No Assistive Device    Gait:   Gait Distance:  (NT due to poor static sitting balance)      Balance:   Static Sit: POOR+: Needs MINIMAL assist to maintain  Dynamic Sit: POOR: N/A  Static Stand: POOR+: Needs MINIMAL assist to maintain  Dynamic stand: POOR: N/A     AM-PAC 6 CLICK MOBILITY  How much help from another person does this patient currently need?   1 = Unable, Total/Dependent Assistance  2 = A lot, Maximum/Moderate Assistance  3 = A little, Minimum/Contact Guard/Supervision  4 = None, Modified [place]/Independent    Turning over in bed (including adjusting bedclothes, sheets and blankets)?: 2  Sitting down on and standing up from a chair with arms (e.g., wheelchair, bedside commode, etc.): 2  Moving from lying on back to sitting on the side of the bed?: 2  Moving to and from a bed  to a chair (including a wheelchair)?: 2  Need to walk in hospital room?: 1  Climbing 3-5 steps with a railing?: 1  Total Score: 10    AM-PAC Raw Score CMS G-Code Modifier Level of Impairment Assistance   6 % Total / Unable   7 - 9 CM 80 - 100% Maximal Assist   10 - 14 CL 60 - 80% Moderate Assist   15 - 19 CK 40 - 60% Moderate Assist   20 - 22 CJ 20 - 40% Minimal Assist   23 CI 1-20% SBA / CGA   24 CH 0% Independent/ Mod I     Patient left up in chair with all lines intact and call button in reach.    Assessment:  Suman Hayden is a 67 y.o. male with a medical diagnosis of LVAD (left ventricular assist device) present and presents with decreased functional independence secondary to decreased balance and endurance.    Rehab identified problem list/impairments: Rehab identified problem list/impairments: impaired functional mobilty, weakness, decreased safety awareness, impaired endurance, impaired balance    Rehab potential is good.    Activity tolerance: Good    Discharge recommendations: Discharge Facility/Level Of Care Needs: rehabilitation facility     Barriers to discharge: Barriers to Discharge: Decreased caregiver support    Equipment recommendations: Equipment Needed After Discharge:  (TBA)     GOALS:    Physical Therapy Goals        Problem: Physical Therapy Goal    Goal Priority Disciplines Outcome Goal Variances Interventions   Physical Therapy Goal     PT/OT, PT Ongoing (interventions implemented as appropriate)     Description:  Goals to be met by: 17     Patient will increase functional independence with mobility by performin. Supine to sit with MInimal Assistance- not met  2. Sit to supine with MInimal Assistance - not met  3. Sit to stand transfer with Minimal Assistance- not met  4. Bed to chair transfer with Minimal Assistance- not met  5. Gait  x 50 feet with Minimal Assistance. - not met  6. Lower extremity exercise program x15 reps, with supervision, in order to increase LE  strength and (I) with functional mobility. - not met                        PLAN:    Patient to be seen 6 x/week  to address the above listed problems via therapeutic exercises, therapeutic activities, gait training  Plan of Care expires: 09/09/17  Plan of Care reviewed with: patient         Leslee T Ventura, PT  08/24/2017

## 2017-08-24 NOTE — PROGRESS NOTES
[facility]  Nephrology  Progress Note    Patient Name: Suman Hayden  MRN: [id number]  Admission Date: 7/18/2017  Hospital Length of Stay: 37 days  Attending Provider: Sunny Downing MD   Primary Care Physician: Joe Ernst MD  Principal Problem:LVAD (left ventricular assist device) present    Subjective:     HPI: Mr. Will is a 66 yo AAM with PMHx of NICM, HTN, HLD, and CKD III who presented to the hospital on 7/18 after syncopal episode at home.  He underwent LVAD placement on 7/27.  Labs that day showed a spike in his SCr to 1.5, but he quickly returned to baseline (1.2-1.3) and stayed stable until 08/01 when he increased to 1.5 and has steadily remained above baseline since, up to 2.3 on consult.  He has remained on lasix infusion with intermittent dosages of diuril to aid in diuresis.  On 08/3, there was a reported decrease in LVAD flows which resolved with decrease in lasix infusion and administration of 1 unit of PRBCs, likely causing decrease renal perfusion leading to an ischemic event.  He continues with adequate UOP on lasix gtt. He was transferred to the floor from ICU on 08/08 and was noted to become hypotensive with a doppler pressure of 58, since been requiring pressors for BP support.  Nephrology was consulted for INDIRA and decreased UOP.    Interval History:   Kidney function improving with adequate UOP.  Net even 24/h period.  No complaints this morning.  Hypokalemic, being replaced.    Review of patient's allergies indicates:  No Known Allergies  Current Facility-Administered Medications   Medication Frequency    0.9%  NaCl infusion (for blood administration) Q24H PRN    albumin human 5% bottle 500 mL PRN    albuterol nebulizer solution 2.5 mg Q4H PRN    amlodipine tablet 2.5 mg Daily    bisacodyl suppository 10 mg Daily PRN    dextrose 50% injection 12.5 g PRN    DOBUtamine 1000 mg in D5W 250 mL infusion (premix non-titrating) Continuous    EPINEPHrine (ADRENALIN) 4 mg  in sodium chloride 0.9% 250 mL infusion Continuous    ertapenem (INVANZ) 0.5 g in sodium chloride 0.9% 50 mL IVPB Q24H    fat emulsion 20% infusion 250 mL Daily    furosemide (LASIX) 500 mg in sodium chloride 0.9% 100 mL infusion Continuous    glucagon (human recombinant) injection 1 mg PRN    heparin 25,000 units in dextrose 5% 250 mL (100 units/mL) infusion (heparin infusion) Continuous    hydrALAZINE injection 10 mg Q6H PRN    insulin aspart pen 0-5 Units Q4H PRN    ondansetron injection 4 mg Q6H PRN    pantoprazole 40 mg in dextrose 5 % 100 mL infusion (ready to mix system) Continuous    sodium chloride 0.9% flush 10 mL Q6H    And    sodium chloride 0.9% flush 10 mL PRN    sodium chloride 0.9% flush 10 mL Q6H    And    sodium chloride 0.9% flush 10 mL PRN    TPN ADULT CENTRAL LINE CUSTOM Continuous    TPN ADULT CENTRAL LINE CUSTOM Continuous    warfarin (COUMADIN) tablet 4 mg Once       Objective:     Vital Signs (Most Recent):  Temp: 98.5 °F (36.9 °C) (08/24/17 1500)  Pulse: 80 (08/24/17 1515)  Resp: (!) 33 (08/24/17 1515)  BP: (!) 88/0 (08/24/17 1500)  SpO2: (!) 94 % (08/24/17 1500)  O2 Device (Oxygen Therapy): room air (08/24/17 1500) Vital Signs (24h Range):  Temp:  [97.8 °F (36.6 °C)-98.9 °F (37.2 °C)] 98.5 °F (36.9 °C)  Pulse:  [] 80  Resp:  [19-44] 33  SpO2:  [92 %-100 %] 94 %  BP: ()/(0) 88/0  Arterial Line BP: ()/(57-80) 92/64     Weight: 90.1 kg (198 lb 10.2 oz) (08/24/17 0500)  Body mass index is 30.2 kg/m².  Body surface area is 2.08 meters squared.    I/O last 3 completed shifts:  In: 3443 [I.V.:1324]  Out: 2855 [Urine:2855]    Physical Exam   Constitutional: He is oriented to person, place, and time. He appears well-developed and well-nourished. No distress.   HENT:   Head: Normocephalic and atraumatic.   Eyes: EOM are normal.   Cardiovascular: Exam reveals no gallop and no friction rub.    No murmur heard.  Smooth LVAD hum   Pulmonary/Chest: Effort normal and  breath sounds normal. No respiratory distress. He has no wheezes. He has no rales.   Abdominal: Soft. There is no tenderness.   Musculoskeletal: He exhibits edema (1+ lower ext.).   Neurological: He is alert and oriented to person, place, and time.   Skin: Skin is warm and dry. No rash noted. He is not diaphoretic.   Vitals reviewed.      Significant Labs:  CBC:   Recent Labs  Lab 08/24/17  1155   WBC 14.95*   RBC 2.59*   HGB 7.2*   HCT 22.5*      MCV 87   MCH 27.8   MCHC 32.0     CMP:   Recent Labs  Lab 08/24/17  1155   *   CALCIUM 8.3*   ALBUMIN 1.9*   PROT 6.1      K 3.8   CO2 23      BUN 95*   CREATININE 2.3*   ALKPHOS 211*   ALT 28   AST 33   BILITOT 1.6*            Assessment/Plan:     Acute kidney injury superimposed on CKD    INDIRA on CKD III  -INDIRA appears to be multifactorial, Ischemic ATN from renal hypoperfusion vs. Septic ATN.  -Baseline SCr appears to be around 1.2-1.3.  He has had multiple INDIRA's in the past, likely 2/2 to poor renal perfusion from cardiomyopathy.  -had initial insult on the day of LVAD surgery on the 27th with a spike in his SCr, but it quickly resolved and returned to baseline.  His SCr stayed stable until the 1st when it increased to 1.5 and has continued to trend up until 2.3 today.   -Signs of decreased perfusion occurred on July 31st with reported drops in LVAD Flows which resolved with decrease in lasix dose and 1 unit of PRBC infusion.     -net even volume status in past 24 hours counting insensible loses.  No lasix given yesterday.  -Kidney function stable.  No signs of uremia on exam.  No need for RRT at this time.  -recommend PRN lasix for volume management.  -will continue following from afar, no further recommendations at this time.            Alfonzo Medina NP  Nephrology  [facility]  Pager:  [phone]

## 2017-08-24 NOTE — PROGRESS NOTES
Progress Note  Surgical Intensive Care    Admit Date: 7/18/2017  Post-operative Day: 7 Days Post-Op  Hospital Day: 38    SUBJECTIVE:     Follow-up For:  Procedure(s) (LRB):  ESOPHAGOGASTRODUODENOSCOPY (EGD) (N/A)   S/p sternotomy closure 7/28/17    Interval history: No acute events overnight. VSS. On 2L NC. Sat in chair yesterday. TPN for nutrition, having difficulty tolerating PO. Currently on Dobutatmine 5     Scheduled Meds:   amlodipine  2.5 mg Oral Daily    ertapenem (INVANZ) IVPB  0.5 g Intravenous Q24H    fat emulsion 20%  250 mL Intravenous Daily    sodium chloride 0.9%  10 mL Intravenous Q6H    sodium chloride 0.9%  10 mL Intravenous Q6H    warfarin  4 mg Oral Once     Continuous Infusions:   DOBUTamine 5 mcg/kg/min (08/24/17 1000)    epinephrine infusion Stopped (08/15/17 1500)    furosemide (LASIX) 5 mg/mL infusion (non-titrating) 10 mg/hr (08/24/17 1100)    heparin (porcine) in 5 % dex 500 Units/hr (08/24/17 1100)    pantoprazole 40 mg in dextrose 5 % 100 mL infusion (ready to mix system) 4 mg/hr (08/24/17 1100)    TPN ADULT CENTRAL LINE CUSTOM 50 mL/hr at 08/24/17 1100    TPN ADULT CENTRAL LINE CUSTOM       PRN Meds:.sodium chloride, albumin human 5%, albuterol sulfate, bisacodyl, dextrose 50%, glucagon (human recombinant), hydrALAZINE, insulin aspart, ondansetron, Flushing PICC Protocol **AND** sodium chloride 0.9% **AND** sodium chloride 0.9%, Flushing PICC Protocol **AND** sodium chloride 0.9% **AND** sodium chloride 0.9%  Review of patient's allergies indicates:  No Known Allergies    OBJECTIVE:     Vital Signs (Most Recent)  Temp: 97.8 °F (36.6 °C) (08/24/17 1100)  Pulse: 80 (08/24/17 1115)  Resp: (!) 27 (08/24/17 1115)  BP: (!) 100/0 (08/24/17 1100)  SpO2: 100 % (08/24/17 1115)    Vital Signs Range (Last 24H):  Temp:  [97.8 °F (36.6 °C)-99.2 °F (37.3 °C)]   Pulse:  []   Resp:  [19-44]   BP: ()/(0)   SpO2:  [99 %-100 %]   Arterial Line BP: ()/(59-80)     I & O  (Last 24H):    Intake/Output Summary (Last 24 hours) at 08/24/17 1157  Last data filed at 08/24/17 1100   Gross per 24 hour   Intake             2328 ml   Output             2075 ml   Net              253 ml        Physical Exam    Constitutional: He appears well-developed and well-nourished. No distress. He is alert  HENT:   Head: Normocephalic and atraumatic.    Neck: Normal range of motion. Neck supple.   Cardiovascular: Intact distal pulses.    LVAD hum present.   Pulmonary/Chest: Effort normal. No respiratory distress..   Abdominal: Soft. He exhibits no distension.   Skin: Skin is warm and dry.     Laboratory (Last 24H):  CBC:    Recent Labs  Lab 08/24/17  0445   WBC 15.19*  15.19*   HGB 7.1*  7.1*   HCT 21.6*  21.6*     245     CMP:    Recent Labs  Lab 08/24/17  0816   CALCIUM 7.9*   ALBUMIN 1.8*   PROT 5.9*      K 3.1*   CO2 21*      BUN 96*   CREATININE 2.2*   ALKPHOS 204*   ALT 28   AST 32   BILITOT 1.5*     Echo    CONCLUSIONS     1 - Heartmate III LVAD; speed 5200; aortic valve opens with every beat.     2 - Eccentric LVH severely depressed left ventricular systolic function (EF 20-25%).     3 - Severe left atrial enlargement.     4 - Restrictive LV filling pattern, indicating markedly elevated LAP (grade 3 diastolic dysfunction).     5 - Mild mitral regurgitation.     6 - Mild tricuspid regurgitation.     7 - Pulmonary hypertension. The estimated PA systolic pressure is 44 mmHg.     8 - Increased central venous pressure.     ASSESSMENT/PLAN:     Neuro:  - alert and responding to commands  - sedation off    Resp:  - stable on 2L NC  - Possible aspiration event causing sepsis - resp cultures growing ESBL   - wean supplemental O2 as tolerated     CV:  - HDS; LVAD numbers stable  - Dobutatmine 5, mgmt per CTS  - epi and levo off  - Hold  given GI bleed    Heme:  - Hgb/Hct 7.1/21.6  - Continue to trend labs  - INR down to 1.3  - Heparin 500u/hr    ID:  - afebrile  - WBC 15.19  -  Likely aspiration pneumonia   - bacteremic and resp cultures ESBL+, on ertapenem  - ID consulted  - blood cx 8/11 NGTD    Renal:  - Singer in place  - UOP 1745 yesterday  - Strict I/Os   - Lasix drip at 10  - BUN/Cr trending down 96/2.2    FEN/GI:  - Currently on Protonix drip; Consider transitioning to BID  - Replace lytes PRN     Endo:  - Endocrine following  - Accuchecks  - SSI     Dispo:  - wean drips as tolerated per CTS    Vince Murguia PGY-1  [phone]  08/24/2017

## 2017-08-24 NOTE — PT/OT/SLP PROGRESS
"Occupational Therapy  Treatment    Suman Hayden   MRN: [id number]   Admitting Diagnosis: LVAD (left ventricular assist device) present    OT Date of Treatment: 08/24/17   OT Start Time: 0945  OT Stop Time: 1015  OT Total Time (min): 30 min    Billable Minutes:  Self Care/Home Management 10 and Therapeutic Activity 15    General Precautions: Standard, LVAD, fall, sternal  Orthopedic Precautions: N/A    Subjective:  Communicated with nsg prior to session.  "i need to use the bathroom" pt reports.     Pain/Comfort  Pain Rating 1: 0/10    Objective:    Pt found supine in bed and agreeable to therapy.      Functional Mobility:  Bed Mobility:  Supine to Sit: Maximum Assistance    Transfers:   Sit <> Stand Assistance: Maximum Assistance  Sit <> Stand Assistive Device: No Assistive Device  Bed <> Chair Transfer Assistance: Maximum Assistance  Bed <> Chair Assistive Device: No Assistive Device    Activities of Daily Living:       UE Dressing Level of Assistance: Total assistance  LE Dressing Level of Assistance: Total assistance  Grooming Position: Seated  Grooming Level of Assistance: Supervision      Pt demo Fair sitting balance EOB and able to engage with seated g/h skills. OT monitored vital signs which remained stable throughout session.  Pt participated with LVAD education; however, pt demo poor recall of item identification and item purpose/usage from prior education. OT provided education pt re: item identification and use/purpose of items including batteries, clips, power cables ect.  Pt frustrated with himself for not knowing the information. Pt with good effort and attention.   L UE noted to be warm and swollen. Nsg notified and arrived to room to assess the arm. NSg notified MD. UE exs deferred this date until more information/testing provided to left UE as recommended by MD.     AM-PAC 6 CLICK ADL   How much help from another person does this patient currently need?   1 = Unable, Total/Dependent Assistance  2 = A " "lot, Maximum/Moderate Assistance  3 = A little, Minimum/Contact Guard/Supervision  4 = None, Modified [place]/Independent    Putting on and taking off regular lower body clothing? : 1  Bathing (including washing, rinsing, drying)?: 1  Toileting, which includes using toilet, bedpan, or urinal? : 1  Putting on and taking off regular upper body clothing?: 1  Taking care of personal grooming such as brushing teeth?: 3  Eating meals?: 1  Total Score: 8     AM-PAC Raw Score CMS "G-Code Modifier Level of Impairment Assistance   6 % Total / Unable   7 - 8 CM 80 - 100% Maximal Assist   9-13 CL 60 - 80% Moderate Assist   14 - 19 CK 40 - 60% Moderate Assist   20 - 22 CJ 20 - 40% Minimal Assist   23 CI 1-20% SBA / CGA   24 CH 0% Independent/ Mod I       Patient left up in chair with all lines intact, call button in reach, nsg notified and spouse present    ASSESSMENT:  Suman Hayden is a 67 y.o. male with a medical diagnosis of LVAD (left ventricular assist device) present and tolerated session fairly well. Pt remains lethargic with poor functional endurance. Pt with poor functional mobility and ADL skills. Pt to benefit from cont OT to address stated goals..    Rehab identified problem list/impairments: Rehab identified problem list/impairments: weakness, impaired endurance, gait instability, impaired balance, impaired self care skills, impaired functional mobilty, decreased safety awareness, impaired cognition, decreased upper extremity function    Rehab potential is good.    Activity tolerance: Good    Discharge recommendations: Discharge Facility/Level Of Care Needs: rehabilitation facility     GOALS:    Occupational Therapy Goals        Problem: Occupational Therapy Goal    Goal Priority Disciplines Outcome Interventions   Occupational Therapy Goal     OT, PT/OT Ongoing (interventions implemented as appropriate)    Description:  Goals to be met by:  2 weeks 8/28/17    Patient will increase functional independence " with ADLs by performing:  Feeding: Independent   UE Dressing with Supervision.  LE Dressing with Supervision.  Grooming while standing with Supervision.  Toileting from toilet with Supervision for hygiene and clothing management.   Stand pivot transfers with Supervision.  Toilet transfer to toilet with Supervision.  Pt will be supervision  with LVAD yasmin't.                  Multidisciplinary Problems (Resolved)        Problem: Occupational Therapy Goal    Goal Priority Disciplines Outcome Interventions   Occupational Therapy Goal   (Resolved)     OT, PT/OT  Error    Description:  Goals to be met by: 8/04/2017    Patient will increase functional independence with ADLs by performing:    Increased functional strength to 5/5 for improved ADL performance.  Upper extremity exercise program and R LE ankle pumps  3x 10 reps per handout, with independence.                      Plan:  Patient to be seen 6 x/week to address the above listed problems via self-care/home management, therapeutic activities, therapeutic exercises  Plan of Care expires: 08/29/17  Plan of Care reviewed with: patient, spouse         DELMY Navarro  08/24/2017

## 2017-08-24 NOTE — PROCEDURES
TXP LVAD INTERROGATIONS 8/24/2017 8/24/2017 8/24/2017 8/24/2017 8/24/2017 8/24/2017 8/24/2017   Type HeartMate3 HeartMate3 HeartMate3 HeartMate3 HeartMate3 HeartMate3 HeartMate3   Flow 4.2 4.2 4.3 4.2 4.4 4.4 4.4   Speed 5200 5200 5200 5200 5200 5200 5200   PI 3.7 3.6 3.6 3.5 3.6 3.5 3.7   Power ([place]) 3.6 3.5 3.6 3.6 3.6 3.6 3.5   LSL - 4800 - - - 4800 4800   Pulsatility Intermittent pulse Intermittent pulse Intermittent pulse Intermittent pulse Intermittent pulse Intermittent pulse Intermittent pulse   Some recent data might be hidden     Pt and wife asleep, will return later for education  HCT= 21.6  Non pulsatile      Returned to room in afternoon for VAD education.  Wife not in room, will try again tomorrow

## 2017-08-24 NOTE — SUBJECTIVE & OBJECTIVE
Interval History:Pt in chair this am. Currently no complaints. Cleared to try clear liquids this am but has not tried yet    Continuous Infusions:   DOBUTamine 5 mcg/kg/min (08/24/17 1400)    epinephrine infusion Stopped (08/15/17 1500)    furosemide (LASIX) 5 mg/mL infusion (non-titrating) 10 mg/hr (08/24/17 1400)    heparin (porcine) in 5 % dex 500 Units/hr (08/24/17 1100)    pantoprazole 40 mg in dextrose 5 % 100 mL infusion (ready to mix system) 4 mg/hr (08/24/17 1400)    TPN ADULT CENTRAL LINE CUSTOM 50 mL/hr at 08/24/17 1400    TPN ADULT CENTRAL LINE CUSTOM       Scheduled Meds:   amlodipine  2.5 mg Oral Daily    ertapenem (INVANZ) IVPB  0.5 g Intravenous Q24H    fat emulsion 20%  250 mL Intravenous Daily    sodium chloride 0.9%  10 mL Intravenous Q6H    sodium chloride 0.9%  10 mL Intravenous Q6H    warfarin  4 mg Oral Once     PRN Meds:sodium chloride, albumin human 5%, albuterol sulfate, bisacodyl, dextrose 50%, glucagon (human recombinant), hydrALAZINE, insulin aspart, ondansetron, Flushing PICC Protocol **AND** sodium chloride 0.9% **AND** sodium chloride 0.9%, Flushing PICC Protocol **AND** sodium chloride 0.9% **AND** sodium chloride 0.9%    Review of patient's allergies indicates:  No Known Allergies  Objective:     Vital Signs (Most Recent):  Temp: 97.8 °F (36.6 °C) (08/24/17 1100)  Pulse: 80 (08/24/17 1400)  Resp: (!) 23 (08/24/17 1400)  BP: (!) 100/0 (08/24/17 1100)  SpO2: (!) 93 % (08/24/17 1400) Vital Signs (24h Range):  Temp:  [97.8 °F (36.6 °C)-99.2 °F (37.3 °C)] 97.8 °F (36.6 °C)  Pulse:  [] 80  Resp:  [19-44] 23  SpO2:  [92 %-100 %] 93 %  BP: ()/(0) 100/0  Arterial Line BP: ()/(57-80) 91/63     Weight: 90.1 kg (198 lb 10.2 oz)  Body mass index is 30.2 kg/m².      Intake/Output Summary (Last 24 hours) at 08/24/17 1425  Last data filed at 08/24/17 1400   Gross per 24 hour   Intake             2328 ml   Output             2335 ml   Net               -7 ml      Physical Exam     Constitutional: He appears well-developed and well-nourished.   HENT:   Head: Normocephalic and atraumatic.   Eyes: EOM are normal. Pupils are equal, round, and reactive to light.   Cardiovascular: Normal rate, regular rhythm and normal heart sounds.  Exam reveals no gallop and no friction rub.    No murmur heard.  + LVAD hum    Pulmonary/Chest: Effort normal. No respiratory distress. He has no wheezes. He has no rales.   Abdominal: Soft. Bowel sounds are normal.   Musculoskeletal: He exhibits no edema.   Neurological: He is alert.   Nursing note and vitals reviewed      Significant Labs:  CBC:    Recent Labs  Lab 08/24/17  0445 08/24/17  1155   WBC 15.19*  15.19*  --    RBC 2.55*  2.55* 2.59*   HGB 7.1*  7.1* 7.2*   HCT 21.6*  21.6* 22.5*     245 250   MCV 85  85 87   MCH 27.8  27.8 27.8   MCHC 32.9  32.9 32.0     BNP:    Recent Labs  Lab 08/23/17  0253   BNP 3,515*     CMP:    Recent Labs  Lab 08/24/17  1155   *   CALCIUM 8.3*   ALBUMIN 1.9*   PROT 6.1      K 3.8   CO2 23      BUN 95*   CREATININE 2.3*   ALKPHOS 211*   ALT 28   AST 33   BILITOT 1.6*      Coagulation:     Recent Labs  Lab 08/24/17  0445   INR 1.3*   APTT 29.6     LDH:    Recent Labs  Lab 08/22/17  0405 08/23/17  0253 08/24/17  0445   * 287* 270*     Microbiology:  Microbiology Results (last 7 days)     Procedure Component Value Units Date/Time    IV catheter culture [[id number]] Collected:  08/16/17 1730    Order Status:  Completed Specimen:  Catheter Tip from Catheter Tip, PICC Updated:  08/18/17 1055     Aerobic Culture - Cath tip No growth          Estimated Creatinine Clearance: 34 mL/min (based on Cr of 2.3).

## 2017-08-25 LAB
ALBUMIN SERPL BCP-MCNC: 2 G/DL
ALBUMIN SERPL BCP-MCNC: 2 G/DL
ALBUMIN SERPL BCP-MCNC: 2.1 G/DL
ALBUMIN SERPL BCP-MCNC: 2.1 G/DL
ALP SERPL-CCNC: 197 U/L
ALP SERPL-CCNC: 199 U/L
ALP SERPL-CCNC: 202 U/L
ALP SERPL-CCNC: 202 U/L
ALT SERPL W/O P-5'-P-CCNC: 26 U/L
ANION GAP SERPL CALC-SCNC: 12 MMOL/L
ANION GAP SERPL CALC-SCNC: 13 MMOL/L
ANION GAP SERPL CALC-SCNC: 13 MMOL/L
ANISOCYTOSIS BLD QL SMEAR: SLIGHT
APTT BLDCRRT: 31.1 SEC
AST SERPL-CCNC: 31 U/L
AST SERPL-CCNC: 35 U/L
BASO STIPL BLD QL SMEAR: ABNORMAL
BASO STIPL BLD QL SMEAR: ABNORMAL
BASOPHILS # BLD AUTO: 0.04 K/UL
BASOPHILS NFR BLD: 0.3 %
BILIRUB DIRECT SERPL-MCNC: 1.2 MG/DL
BILIRUB SERPL-MCNC: 1.6 MG/DL
BILIRUB SERPL-MCNC: 1.6 MG/DL
BILIRUB SERPL-MCNC: 1.7 MG/DL
BILIRUB SERPL-MCNC: 1.8 MG/DL
BNP SERPL-MCNC: 3601 PG/ML
BUN SERPL-MCNC: 96 MG/DL
BUN SERPL-MCNC: 97 MG/DL
BUN SERPL-MCNC: 98 MG/DL
CALCIUM SERPL-MCNC: 8.5 MG/DL
CALCIUM SERPL-MCNC: 8.6 MG/DL
CALCIUM SERPL-MCNC: 8.8 MG/DL
CHLORIDE SERPL-SCNC: 106 MMOL/L
CHLORIDE SERPL-SCNC: 106 MMOL/L
CHLORIDE SERPL-SCNC: 108 MMOL/L
CO2 SERPL-SCNC: 24 MMOL/L
CO2 SERPL-SCNC: 25 MMOL/L
CO2 SERPL-SCNC: 25 MMOL/L
CREAT SERPL-MCNC: 2.2 MG/DL
CREAT SERPL-MCNC: 2.2 MG/DL
CREAT SERPL-MCNC: 2.3 MG/DL
CRP SERPL-MCNC: 89.5 MG/L
DIFFERENTIAL METHOD: ABNORMAL
EOSINOPHIL # BLD AUTO: 0.2 K/UL
EOSINOPHIL # BLD AUTO: 0.2 K/UL
EOSINOPHIL # BLD AUTO: 0.3 K/UL
EOSINOPHIL # BLD AUTO: 0.3 K/UL
EOSINOPHIL NFR BLD: 1.3 %
EOSINOPHIL NFR BLD: 1.5 %
EOSINOPHIL NFR BLD: 1.8 %
EOSINOPHIL NFR BLD: 2.1 %
ERYTHROCYTE [DISTWIDTH] IN BLOOD BY AUTOMATED COUNT: 17.6 %
ERYTHROCYTE [DISTWIDTH] IN BLOOD BY AUTOMATED COUNT: 17.6 %
ERYTHROCYTE [DISTWIDTH] IN BLOOD BY AUTOMATED COUNT: 17.7 %
ERYTHROCYTE [DISTWIDTH] IN BLOOD BY AUTOMATED COUNT: 17.7 %
EST. GFR  (AFRICAN AMERICAN): 32.7 ML/MIN/1.73 M^2
EST. GFR  (AFRICAN AMERICAN): 34.6 ML/MIN/1.73 M^2
EST. GFR  (AFRICAN AMERICAN): 34.6 ML/MIN/1.73 M^2
EST. GFR  (NON AFRICAN AMERICAN): 28.3 ML/MIN/1.73 M^2
EST. GFR  (NON AFRICAN AMERICAN): 29.9 ML/MIN/1.73 M^2
EST. GFR  (NON AFRICAN AMERICAN): 29.9 ML/MIN/1.73 M^2
GIANT PLATELETS BLD QL SMEAR: PRESENT
GIANT PLATELETS BLD QL SMEAR: PRESENT
GLUCOSE SERPL-MCNC: 102 MG/DL
GLUCOSE SERPL-MCNC: 113 MG/DL
GLUCOSE SERPL-MCNC: 97 MG/DL
HCT VFR BLD AUTO: 21.9 %
HCT VFR BLD AUTO: 22 %
HCT VFR BLD AUTO: 22.8 %
HCT VFR BLD AUTO: 23.1 %
HGB BLD-MCNC: 7.2 G/DL
HGB BLD-MCNC: 7.3 G/DL
HGB BLD-MCNC: 7.4 G/DL
HGB BLD-MCNC: 7.5 G/DL
HGB FREE PLAS-MCNC: 1.9 MG/DL (ref 0–9.7)
HYPOCHROMIA BLD QL SMEAR: ABNORMAL
HYPOCHROMIA BLD QL SMEAR: ABNORMAL
INR PPP: 1.4
LDH SERPL L TO P-CCNC: 327 U/L
LYMPHOCYTES # BLD AUTO: 1.3 K/UL
LYMPHOCYTES # BLD AUTO: 1.3 K/UL
LYMPHOCYTES # BLD AUTO: 1.5 K/UL
LYMPHOCYTES # BLD AUTO: 1.6 K/UL
LYMPHOCYTES NFR BLD: 10.2 %
LYMPHOCYTES NFR BLD: 10.3 %
LYMPHOCYTES NFR BLD: 11.3 %
LYMPHOCYTES NFR BLD: 9.9 %
MAGNESIUM SERPL-MCNC: 1.8 MG/DL
MCH RBC QN AUTO: 27.7 PG
MCH RBC QN AUTO: 27.9 PG
MCH RBC QN AUTO: 28 PG
MCH RBC QN AUTO: 28.1 PG
MCHC RBC AUTO-ENTMCNC: 32 G/DL
MCHC RBC AUTO-ENTMCNC: 32.9 G/DL
MCHC RBC AUTO-ENTMCNC: 32.9 G/DL
MCHC RBC AUTO-ENTMCNC: 33.2 G/DL
MCV RBC AUTO: 84 FL
MCV RBC AUTO: 85 FL
MCV RBC AUTO: 85 FL
MCV RBC AUTO: 87 FL
MONOCYTES # BLD AUTO: 0.8 K/UL
MONOCYTES # BLD AUTO: 0.9 K/UL
MONOCYTES # BLD AUTO: 0.9 K/UL
MONOCYTES # BLD AUTO: 1 K/UL
MONOCYTES NFR BLD: 6 %
MONOCYTES NFR BLD: 6.3 %
MONOCYTES NFR BLD: 6.9 %
MONOCYTES NFR BLD: 7.3 %
NEUTROPHILS # BLD AUTO: 10.2 K/UL
NEUTROPHILS # BLD AUTO: 10.6 K/UL
NEUTROPHILS # BLD AUTO: 11.3 K/UL
NEUTROPHILS # BLD AUTO: 11.4 K/UL
NEUTROPHILS NFR BLD: 80 %
NEUTROPHILS NFR BLD: 80.6 %
NEUTROPHILS NFR BLD: 81.6 %
NEUTROPHILS NFR BLD: 81.7 %
OVALOCYTES BLD QL SMEAR: ABNORMAL
PHOSPHATE SERPL-MCNC: 3.7 MG/DL
PLATELET # BLD AUTO: 267 K/UL
PLATELET # BLD AUTO: 270 K/UL
PLATELET # BLD AUTO: 273 K/UL
PLATELET # BLD AUTO: 280 K/UL
PLATELET BLD QL SMEAR: ABNORMAL
PMV BLD AUTO: 12 FL
PMV BLD AUTO: 12.1 FL
PMV BLD AUTO: 12.4 FL
PMV BLD AUTO: 12.9 FL
POCT GLUCOSE: 106 MG/DL (ref 70–110)
POCT GLUCOSE: 114 MG/DL (ref 70–110)
POCT GLUCOSE: 119 MG/DL (ref 70–110)
POCT GLUCOSE: 120 MG/DL (ref 70–110)
POCT GLUCOSE: 148 MG/DL (ref 70–110)
POCT GLUCOSE: 52 MG/DL (ref 70–110)
POCT GLUCOSE: 98 MG/DL (ref 70–110)
POIKILOCYTOSIS BLD QL SMEAR: SLIGHT
POLYCHROMASIA BLD QL SMEAR: ABNORMAL
POTASSIUM SERPL-SCNC: 3.4 MMOL/L
POTASSIUM SERPL-SCNC: 3.7 MMOL/L
POTASSIUM SERPL-SCNC: 3.9 MMOL/L
PREALB SERPL-MCNC: 13 MG/DL
PROT SERPL-MCNC: 6.4 G/DL
PROT SERPL-MCNC: 6.4 G/DL
PROT SERPL-MCNC: 6.5 G/DL
PROT SERPL-MCNC: 6.6 G/DL
PROTHROMBIN TIME: 14.1 SEC
RBC # BLD AUTO: 2.57 M/UL
RBC # BLD AUTO: 2.62 M/UL
RBC # BLD AUTO: 2.67 M/UL
RBC # BLD AUTO: 2.67 M/UL
SODIUM SERPL-SCNC: 143 MMOL/L
SODIUM SERPL-SCNC: 144 MMOL/L
SODIUM SERPL-SCNC: 145 MMOL/L
TARGETS BLD QL SMEAR: ABNORMAL
WBC # BLD AUTO: 12.6 K/UL
WBC # BLD AUTO: 12.99 K/UL
WBC # BLD AUTO: 14.18 K/UL
WBC # BLD AUTO: 14.25 K/UL

## 2017-08-25 PROCEDURE — 84134 ASSAY OF PREALBUMIN: CPT

## 2017-08-25 PROCEDURE — 84100 ASSAY OF PHOSPHORUS: CPT

## 2017-08-25 PROCEDURE — C9113 INJ PANTOPRAZOLE SODIUM, VIA: HCPCS | Performed by: THORACIC SURGERY (CARDIOTHORACIC VASCULAR SURGERY)

## 2017-08-25 PROCEDURE — 99232 SBSQ HOSP IP/OBS MODERATE 35: CPT | Mod: 25,,, | Performed by: INTERNAL MEDICINE

## 2017-08-25 PROCEDURE — 63600175 PHARM REV CODE 636 W HCPCS: Performed by: STUDENT IN AN ORGANIZED HEALTH CARE EDUCATION/TRAINING PROGRAM

## 2017-08-25 PROCEDURE — 25000003 PHARM REV CODE 250: Performed by: INTERNAL MEDICINE

## 2017-08-25 PROCEDURE — 85730 THROMBOPLASTIN TIME PARTIAL: CPT

## 2017-08-25 PROCEDURE — 83880 ASSAY OF NATRIURETIC PEPTIDE: CPT

## 2017-08-25 PROCEDURE — 25000003 PHARM REV CODE 250: Performed by: STUDENT IN AN ORGANIZED HEALTH CARE EDUCATION/TRAINING PROGRAM

## 2017-08-25 PROCEDURE — 20000000 HC ICU ROOM

## 2017-08-25 PROCEDURE — 92526 ORAL FUNCTION THERAPY: CPT

## 2017-08-25 PROCEDURE — 97530 THERAPEUTIC ACTIVITIES: CPT

## 2017-08-25 PROCEDURE — 97803 MED NUTRITION INDIV SUBSEQ: CPT

## 2017-08-25 PROCEDURE — 93750 INTERROGATION VAD IN PERSON: CPT | Performed by: STUDENT IN AN ORGANIZED HEALTH CARE EDUCATION/TRAINING PROGRAM

## 2017-08-25 PROCEDURE — 97116 GAIT TRAINING THERAPY: CPT

## 2017-08-25 PROCEDURE — 83615 LACTATE (LD) (LDH) ENZYME: CPT

## 2017-08-25 PROCEDURE — B4185 PARENTERAL SOL 10 GM LIPIDS: HCPCS | Performed by: STUDENT IN AN ORGANIZED HEALTH CARE EDUCATION/TRAINING PROGRAM

## 2017-08-25 PROCEDURE — 86140 C-REACTIVE PROTEIN: CPT

## 2017-08-25 PROCEDURE — 25000003 PHARM REV CODE 250: Performed by: THORACIC SURGERY (CARDIOTHORACIC VASCULAR SURGERY)

## 2017-08-25 PROCEDURE — 63600175 PHARM REV CODE 636 W HCPCS: Performed by: GENERAL PRACTICE

## 2017-08-25 PROCEDURE — 27000248 HC VAD-ADDITIONAL DAY

## 2017-08-25 PROCEDURE — 63600175 PHARM REV CODE 636 W HCPCS: Performed by: THORACIC SURGERY (CARDIOTHORACIC VASCULAR SURGERY)

## 2017-08-25 PROCEDURE — 63600175 PHARM REV CODE 636 W HCPCS: Performed by: INTERNAL MEDICINE

## 2017-08-25 PROCEDURE — 97535 SELF CARE MNGMENT TRAINING: CPT

## 2017-08-25 PROCEDURE — 80076 HEPATIC FUNCTION PANEL: CPT

## 2017-08-25 PROCEDURE — 25000003 PHARM REV CODE 250: Performed by: ANESTHESIOLOGY

## 2017-08-25 PROCEDURE — 85025 COMPLETE CBC W/AUTO DIFF WBC: CPT | Mod: 91

## 2017-08-25 PROCEDURE — 85610 PROTHROMBIN TIME: CPT

## 2017-08-25 PROCEDURE — A4216 STERILE WATER/SALINE, 10 ML: HCPCS | Performed by: THORACIC SURGERY (CARDIOTHORACIC VASCULAR SURGERY)

## 2017-08-25 PROCEDURE — 83735 ASSAY OF MAGNESIUM: CPT

## 2017-08-25 PROCEDURE — 80053 COMPREHEN METABOLIC PANEL: CPT | Mod: 91

## 2017-08-25 PROCEDURE — 99232 SBSQ HOSP IP/OBS MODERATE 35: CPT | Mod: GC,,, | Performed by: ANESTHESIOLOGY

## 2017-08-25 PROCEDURE — 99233 SBSQ HOSP IP/OBS HIGH 50: CPT | Mod: 24,,, | Performed by: THORACIC SURGERY (CARDIOTHORACIC VASCULAR SURGERY)

## 2017-08-25 PROCEDURE — 93750 INTERROGATION VAD IN PERSON: CPT | Mod: ,,, | Performed by: THORACIC SURGERY (CARDIOTHORACIC VASCULAR SURGERY)

## 2017-08-25 RX ORDER — POTASSIUM CHLORIDE 20 MEQ/1
40 TABLET, EXTENDED RELEASE ORAL ONCE
Status: COMPLETED | OUTPATIENT
Start: 2017-08-25 | End: 2017-08-25

## 2017-08-25 RX ORDER — WARFARIN SODIUM 5 MG/1
5 TABLET ORAL ONCE
Status: COMPLETED | OUTPATIENT
Start: 2017-08-25 | End: 2017-08-25

## 2017-08-25 RX ORDER — WARFARIN SODIUM 5 MG/1
5 TABLET ORAL ONCE
Status: DISCONTINUED | OUTPATIENT
Start: 2017-08-25 | End: 2017-08-25

## 2017-08-25 RX ORDER — LANOLIN ALCOHOL/MO/W.PET/CERES
400 CREAM (GRAM) TOPICAL ONCE
Status: COMPLETED | OUTPATIENT
Start: 2017-08-25 | End: 2017-08-25

## 2017-08-25 RX ORDER — POTASSIUM CHLORIDE 20 MEQ/1
40 TABLET, EXTENDED RELEASE ORAL DAILY
Status: DISCONTINUED | OUTPATIENT
Start: 2017-08-25 | End: 2017-08-29

## 2017-08-25 RX ADMIN — WARFARIN SODIUM 5 MG: 5 TABLET ORAL at 04:08

## 2017-08-25 RX ADMIN — Medication 10 ML: at 12:08

## 2017-08-25 RX ADMIN — POTASSIUM CHLORIDE 40 MEQ: 1500 TABLET, EXTENDED RELEASE ORAL at 08:08

## 2017-08-25 RX ADMIN — Medication 10 ML: at 05:08

## 2017-08-25 RX ADMIN — POTASSIUM CHLORIDE 40 MEQ: 1500 TABLET, EXTENDED RELEASE ORAL at 03:08

## 2017-08-25 RX ADMIN — DOBUTAMINE IN DEXTROSE 5 MCG/KG/MIN: 400 INJECTION, SOLUTION INTRAVENOUS at 09:08

## 2017-08-25 RX ADMIN — CALCIUM GLUCONATE: 94 INJECTION, SOLUTION INTRAVENOUS at 09:08

## 2017-08-25 RX ADMIN — FUROSEMIDE 10 MG/HR: 10 INJECTION, SOLUTION INTRAMUSCULAR; INTRAVENOUS at 12:08

## 2017-08-25 RX ADMIN — HEPARIN SODIUM AND DEXTROSE 500 UNITS/HR: 10000; 5 INJECTION INTRAVENOUS at 09:08

## 2017-08-25 RX ADMIN — ONDANSETRON 4 MG: 2 INJECTION INTRAMUSCULAR; INTRAVENOUS at 02:08

## 2017-08-25 RX ADMIN — SOYBEAN OIL 250 ML: 20 INJECTION, SOLUTION INTRAVENOUS at 09:08

## 2017-08-25 RX ADMIN — PANTOPRAZOLE SODIUM 2 MG/HR: 40 INJECTION, POWDER, FOR SOLUTION INTRAVENOUS at 09:08

## 2017-08-25 RX ADMIN — SODIUM CHLORIDE 0.5 G: 9 INJECTION, SOLUTION INTRAVENOUS at 09:08

## 2017-08-25 RX ADMIN — HYDRALAZINE HYDROCHLORIDE 10 MG: 20 INJECTION INTRAMUSCULAR; INTRAVENOUS at 04:08

## 2017-08-25 RX ADMIN — MAGNESIUM OXIDE TAB 400 MG (241.3 MG ELEMENTAL MG) 400 MG: 400 (241.3 MG) TAB at 08:08

## 2017-08-25 RX ADMIN — DEXTROSE MONOHYDRATE 12.5 G: 25 INJECTION, SOLUTION INTRAVENOUS at 11:08

## 2017-08-25 RX ADMIN — Medication 10 ML: at 06:08

## 2017-08-25 RX ADMIN — AMLODIPINE BESYLATE 2.5 MG: 2.5 TABLET ORAL at 09:08

## 2017-08-25 NOTE — SUBJECTIVE & OBJECTIVE
"Interval History:  Pt still in bed this am. No complaints. Did not eat or drink yesterday because he "felt sick to my stomach"    Continuous Infusions:   DOBUTamine 5 mcg/kg/min (08/25/17 1400)    epinephrine infusion Stopped (08/15/17 1500)    furosemide (LASIX) 1 mg/mL infusion (non-titrating) 10 mg/hr (08/25/17 1400)    heparin (porcine) in 5 % dex 500 Units/hr (08/25/17 1400)    pantoprazole 40 mg in dextrose 5 % 100 mL infusion (ready to mix system) 2 mg/hr (08/25/17 1400)    TPN ADULT CENTRAL LINE CUSTOM 40 mL/hr at 08/25/17 1400    TPN ADULT CENTRAL LINE CUSTOM       Scheduled Meds:   amlodipine  2.5 mg Oral Daily    ertapenem (INVANZ) IVPB  0.5 g Intravenous Q24H    fat emulsion 20%  250 mL Intravenous Daily    potassium chloride  40 mEq Oral Daily    potassium chloride  40 mEq Oral Once    sodium chloride 0.9%  10 mL Intravenous Q6H    sodium chloride 0.9%  10 mL Intravenous Q6H    warfarin  5 mg Oral Once     PRN Meds:sodium chloride, albumin human 5%, albuterol sulfate, bisacodyl, dextrose 50%, glucagon (human recombinant), hydrALAZINE, insulin aspart, ondansetron, Flushing PICC Protocol **AND** sodium chloride 0.9% **AND** sodium chloride 0.9%, Flushing PICC Protocol **AND** sodium chloride 0.9% **AND** sodium chloride 0.9%    Review of patient's allergies indicates:  No Known Allergies  Objective:     Vital Signs (Most Recent):  Temp: 98.1 °F (36.7 °C) (08/25/17 1100)  Pulse: 80 (08/25/17 1445)  Resp: (!) 26 (08/25/17 1445)  BP: (!) 90/0 (08/25/17 1100)  SpO2: 100 % (08/25/17 1430) Vital Signs (24h Range):  Temp:  [98.1 °F (36.7 °C)-99.1 °F (37.3 °C)] 98.1 °F (36.7 °C)  Pulse:  [] 80  Resp:  [23-44] 26  SpO2:  [95 %-100 %] 100 %  BP: (90-98)/(0) 90/0  Arterial Line BP: ()/(55-77) 101/67     Weight: 88.9 kg (195 lb 15.8 oz)  Body mass index is 29.8 kg/m².      Intake/Output Summary (Last 24 hours) at 08/25/17 1513  Last data filed at 08/25/17 1400   Gross per 24 hour   Intake   "        2405.47 ml   Output             4345 ml   Net         -1939.53 ml     Physical Exam     Constitutional: He appears well-developed and well-nourished.   HENT:   Head: Normocephalic and atraumatic.   Eyes: EOM are normal. Pupils are equal, round, and reactive to light.   Cardiovascular: Normal rate, regular rhythm and normal heart sounds.  Exam reveals no gallop and no friction rub.    No murmur heard.  + LVAD hum    Pulmonary/Chest: Effort normal. No respiratory distress. He has no wheezes. He has no rales.   Abdominal: Soft. Bowel sounds are normal.   Musculoskeletal: He exhibits no edema.   Neurological: He is alert.   Nursing note and vitals reviewed      Significant Labs:  CBC:    Recent Labs  Lab 08/25/17  1242   WBC 12.99*   RBC 2.67*   HGB 7.4*   HCT 23.1*      MCV 87   MCH 27.7   MCHC 32.0     BNP:    Recent Labs  Lab 08/25/17  0407   BNP 3,601*     CMP:    Recent Labs  Lab 08/25/17  1243   *   CALCIUM 8.5*   ALBUMIN 2.1*   PROT 6.6      K 3.7   CO2 25      BUN 97*   CREATININE 2.2*   ALKPHOS 199*   ALT 26   AST 31   BILITOT 1.7*      Coagulation:     Recent Labs  Lab 08/25/17  0407   INR 1.4*   APTT 31.1     LDH:    Recent Labs  Lab 08/23/17  0253 08/24/17  0445 08/25/17  0407   * 270* 327*     Microbiology:  Microbiology Results (last 7 days)     ** No results found for the last 168 hours. **          Estimated Creatinine Clearance: 35.3 mL/min (based on Cr of 2.2).

## 2017-08-25 NOTE — PROGRESS NOTES
Daily E and M and VAD Interrogation Note    Reason for Visit:  Patient is seen in follow up for management of:  [] HeartMate II  [] Heartware [] Total artificial heart       [] ECMO           [x] Other - HM III     Interval History:  [] Interval history unobtainable due to intubation.  The [x] implant/[] explant date was 7/27/17    Events -  NAEON.  Making excellent urine.  Sat in chair o/n       Review of Systems:   Negative except as above.      Medications:  Current Facility-Administered Medications   Medication Dose Route Frequency Provider Last Rate Last Dose    0.9%  NaCl infusion (for blood administration)   Intravenous Q24H PRN Andres Uriarte MD        albumin human 5% bottle 500 mL  500 mL Intravenous PRN Shayne Espinoza MD   500 mL at 08/09/17 0700    albuterol nebulizer solution 2.5 mg  2.5 mg Nebulization Q4H PRN Shayne Espinoza MD        amlodipine tablet 2.5 mg  2.5 mg Oral Daily Andres Uriarte MD   2.5 mg at 08/25/17 0908    bisacodyl suppository 10 mg  10 mg Rectal Daily PRN Shayne Espinoza MD   10 mg at 08/06/17 2036    dextrose 50% injection 12.5 g  12.5 g Intravenous PRN Charbel Ritter MD   12.5 g at 08/25/17 1154    DOBUtamine 1000 mg in D5W 250 mL infusion (premix non-titrating)  5 mcg/kg/min (Dosing Weight) Intravenous Continuous Sunny Downing MD 6 mL/hr at 08/25/17 1200 5 mcg/kg/min at 08/25/17 1200    EPINEPHrine (ADRENALIN) 4 mg in sodium chloride 0.9% 250 mL infusion  0.01 mcg/kg/min (Dosing Weight) Intravenous Continuous Shayne Espinoza MD   Stopped at 08/15/17 1500    ertapenem (INVANZ) 0.5 g in sodium chloride 0.9% 50 mL IVPB  0.5 g Intravenous Q24H Angie Torres  mL/hr at 08/24/17 2025 0.5 g at 08/24/17 2025    fat emulsion 20% infusion 250 mL  250 mL Intravenous Daily Andres Uriarte MD        furosemide (LASIX) 250 mg in sodium chloride 0.9 % 250 mL infusion (non-titrating)  10 mg/hr Intravenous Continuous Andres Uriarte MD  10 mL/hr at 08/25/17 1300 10 mg/hr at 08/25/17 1300    glucagon (human recombinant) injection 1 mg  1 mg Intramuscular PRN Charbel Ritter MD        heparin 25,000 units in dextrose 5% 250 mL (100 units/mL) infusion (heparin infusion)  500 Units/hr Intravenous Continuous Andres Uriarte MD 5 mL/hr at 08/25/17 1300 500 Units/hr at 08/25/17 1300    hydrALAZINE injection 10 mg  10 mg Intravenous Q6H PRN Shlomo Serrato MD   10 mg at 08/25/17 0448    insulin aspart pen 0-5 Units  0-5 Units Subcutaneous Q4H PRN Charbel Ritter MD   2 Units at 08/10/17 0751    ondansetron injection 4 mg  4 mg Intravenous Q6H PRN Shayne Espinoza MD   4 mg at 08/08/17 2112    pantoprazole 40 mg in dextrose 5 % 100 mL infusion (ready to mix system)  8 mg/hr Intravenous Continuous Sunny Downing MD 5 mL/hr at 08/25/17 1300 2 mg/hr at 08/25/17 1300    potassium chloride SA CR tablet 40 mEq  40 mEq Oral Daily Andres Uriarte MD   40 mEq at 08/25/17 0858    sodium chloride 0.9% flush 10 mL  10 mL Intravenous Q6H Sunny Downing MD   10 mL at 08/25/17 1200    And    sodium chloride 0.9% flush 10 mL  10 mL Intravenous PRN Sunny Downing MD   10 mL at 08/10/17 1151    sodium chloride 0.9% flush 10 mL  10 mL Intravenous Q6H Sunny Downing MD   10 mL at 08/25/17 1200    And    sodium chloride 0.9% flush 10 mL  10 mL Intravenous PRN Sunny Downing MD        TPN ADULT CENTRAL LINE CUSTOM   Intravenous Continuous Andres Uriarte MD 40 mL/hr at 08/25/17 1300      TPN ADULT CENTRAL LINE CUSTOM   Intravenous Continuous Andres Uriarte MD        warfarin (COUMADIN) tablet 5 mg  5 mg Oral Once Andres Uriarte MD         Physical Examination:  Vital Signs:   Vitals:    08/25/17 1300   BP:    Pulse: 80   Resp: (!) 34   Temp:      Cardiovascular:  [x] Regular rate and rhythm [] Irregular []  None (MATT) []  Other  []  No edema [x]  Edema present  [x]  Clear to auscultation  []  Rales to []  Coarse  []  No rales but    [] Pedal Pulses absent  [x]  Pulses  + throughout  Skin:  Incision is [x]  Clean, dry and intact.  []  Other   Sternum:  [x]  Stable []  Unstable  Driveline(s):   [x]  Clean, dry and intact. []  Other     Labs:  BMP  Lab Results   Component Value Date     08/25/2017    K 3.7 08/25/2017     08/25/2017    CO2 25 08/25/2017    BUN 97 (H) 08/25/2017    CREATININE 2.2 (H) 08/25/2017    CALCIUM 8.5 (L) 08/25/2017    ANIONGAP 12 08/25/2017    ESTGFRAFRICA 34.6 (A) 08/25/2017    EGFRNONAA 29.9 (A) 08/25/2017     Magnesium   Date Value Ref Range Status   08/25/2017 1.8 1.6 - 2.6 mg/dL Final     Lab Results   Component Value Date    WBC 12.99 (H) 08/25/2017    HGB 7.4 (L) 08/25/2017    HCT 23.1 (L) 08/25/2017    MCV 87 08/25/2017     08/25/2017     Lab Results   Component Value Date    INR 1.4 (H) 08/25/2017    INR 1.3 (H) 08/24/2017    INR 1.2 08/23/2017     BNP   Date Value Ref Range Status   08/25/2017 3,601 (H) 0 - 99 pg/mL Final     Comment:     Values of less than 100 pg/ml are consistent with non-CHF populations.   08/23/2017 3,515 (H) 0 - 99 pg/mL Final     Comment:     Values of less than 100 pg/ml are consistent with non-CHF populations.   08/21/2017 1,804 (H) 0 - 99 pg/mL Final     Comment:     Values of less than 100 pg/ml are consistent with non-CHF populations.     LD   Date Value Ref Range Status   08/25/2017 327 (H) 110 - 260 U/L Final     Comment:     Results are increased in hemolyzed samples.   08/24/2017 270 (H) 110 - 260 U/L Final     Comment:     Results are increased in hemolyzed samples.   08/23/2017 287 (H) 110 - 260 U/L Final     Comment:     Results are increased in hemolyzed samples.     X-Rays:  [x]  I reviewed today's Chest x-ray    Procedure:  Device Interrogation including analysis of device parameters.  Current Settings   [x]  Ventricular Assist Device  []  Total Artificial Heart interrogated  Review of device function is [x]  Stable []  Other   TXP LVAD INTERROGATIONS  8/25/2017 8/25/2017 8/25/2017 8/25/2017 8/25/2017 8/25/2017 8/25/2017   Type HeartMate3 HeartMate3 HeartMate3 HeartMate3 HeartMate3 HeartMate3 HeartMate3   Flow 4.4 4.4 4.3 4.3 4.3 4.3 4.4   Speed 5200 5200 5200 5200 5200 5200 5200   PI 3.4 3.5 3.5 3.4 3.5 3.6 3.5   Power ([place]) 3.5 3.6 3.6 3.6 3.5 3.5 3.6   LSL - - 4800 - - - 4800   Pulsatility Pulse Intermittent pulse Intermittent pulse Intermittent pulse Intermittent pulse Intermittent pulse Intermittent pulse   Some recent data might be hidden       Assessment:  [x]  Primary Cardiomyopathy [x]  Congestive Heart Failure   []  Atrial Fibrillation []  Ventricular Tachycardia   [x]  Aftercare cardiac device [x]  Long term (current) use of anticoagulants   []  Ventilator-associated pneumonia []  Pneumonia viral, unspecified   []  Pneumonia, bacterial, unspecified []  Pneumonia, organism unspecified   [x]  Hemorrhage of GI tract, unspecified    []  Nosebleed []  Driveline infection   []  Infection VAD device []  New onset of    []        Plan:  [x]  Interval history obtained from ICU attending team member during rounding today  [x]  VAD/MATT teaching performed with patient  [x]  Mobilization / Physical Therapy ongoing  [x]  Anticoagulation [x]  Ongoing []  Held  []  Studies ordered  []   To OR today for closure.       Total time spent was 30 minutes.  Of which more than 50 percent of the care dominated counseling and coordinating care with different team members. The VAD was interrogated and all parameters were WNL and no significant findings were found in the history. All these findings are documented in the note above.    Neuro:  - Alert and oriented    Resp:  - Extubated  - Resp cultures with ESBL - Ertapenem per ID   - Minimize supplemental O2  - Pulmonary toilet    CV:  - HDS; LVAD numbers stable  -   - Dobutamine at 2.5  - Dopamine at 3  - Norvasc   - Hold  given GI bleed  - LVAD numbers stable      Heme:  - Hgb/Hct stable    - Continue to trend  -  INR 1.4 this AM   - PICC in place  - Holding ASA 2/2 bleeding  - Coumadin - 5mg   - Heparin - PTT 40-50 today    ID:  - afebrile  - Ertapenem started for ESBL pos resp culture  - WBC 13  - PICC replaced 8/17   - Appreciate ID recs  - continue to monitor     Renal:  - Singer in place  - Strict I/Os   - Excellent UOP yesterday   - Lasix gtt restarted at 10/hr  - BUN/Cr stable  - Nephro is following.      FEN/GI:  - Clears   - Protonix gtt at 2  - Replace lytes PRN  - Continue TPN    Endo:  - Endocrine following, appreciate assistance  - Accuchecks  - Insulin ssi    Dispo:  - Continue ICU care.     Date of service:  08/25/2017     Andres Uriarte MD       Cardiac Surgery Attending E and M (VAD) Note along with VAD Interrogation    I have seen and examined the patient and agree with the findings above    I also reviewed the patients clinical course and:  [x]  Hemodynamic & Respiratory paramters  [x]  Laboratory Data  [x]  Radiological studies     VAD Interrogated [x]      VAD Function is normal. Changes made []  None [x]        Interrogation of Ventricular assist device was performed with physician analysis of device parameters and review of device function. I have personally reviewed the interrogation findings and agree with findings as stated

## 2017-08-25 NOTE — PT/OT/SLP PROGRESS
"Speech Language Pathology  Treatment    Suman Hayden   MRN: [id number]   [id number] A     Admitting Diagnosis: LVAD (left ventricular assist device) present    Diet recommendations:   Solid Diet Level: Puree    Liquid Diet Level: Thin    Feed only when awake/alert, HOB to 90 degrees and Meds crushed in puree    SLP Treatment Date: 08/25/17  Speech Start Time: 1422 ([id number])     Speech Stop Time: 1438     Speech Total (min): 25 min       TREATMENT BILLABLE MINUTES:  Treatment Swallowing Dysfunction 25    Has the patient been evaluated by SLP for swallowing? : Yes  Keep patient NPO?: No   General Precautions: Standard, aspiration, LVAD, fall, sternal  Current Respiratory Status: nasal cannula       Subjective:  "I can feed myself."    Pain/Comfort  Pain Rating 1: 0/10    Objective:   Patient found with: blood pressure cuff, oxygen, telemetry, pulse ox (continuous), delgado catheter  Pt seen for ongoing assessment of swallow function/safety. Pt seen in morning. Pt reported feeling hungry with desire to eat. Discussed options for diet and pt requested creamed potatoes. Pt seen this afternoon with creamed potatoes and thin liquids via straw sips. Oral phase c/b mildly prolonged mastication and formation of cohesive bolus. No oral residue noted. Pharyngeal phase c/b no overt s/s of aspiration. Recommend pt remain on puree diet with thin liquids. Will follow up to monitor and assess for diet upgrade on Monday.     Assessment:  Suman Hayden is a 67 y.o. male with a medical diagnosis of LVAD (left ventricular assist device) present and presents with dysphagia.    Discharge recommendations: Discharge Facility/Level Of Care Needs: rehabilitation facility     Goals:    SLP Goals        Problem: SLP Goal    Goal Priority Disciplines Outcome   SLP Goal     SLP Ongoing (interventions implemented as appropriate)   Description:  Speech Language Pathology Goals  Goals expected to be met by 8/28  1. Pt will tolerate thin liquids with no " overt s/s of aspiration.   2. Pt will tolerate dental soft diet with adequate a-p transit and no overt s/s of aspiration                    Multidisciplinary Problems (Resolved)        Problem: SLP Goal    Goal Priority Disciplines Outcome   SLP Goal   (Resolved)     SLP Outcome(s) achieved   Description:  Speech Language Pathology Goals  Goals expected to be met by 8/4:  1. Pt will tolerate a dental soft diet and thin liquids without s/s of aspiration.                          Plan:   Patient to be seen Therapy Frequency: 5 x/week   Plan of Care expires: 09/12/17  Plan of Care reviewed with: patient, spouse  SLP Follow-up?: Yes           SHERRI Hart, CCC-SLP, CLC  Speech Language Pathologist  Certified Lactation Counselor  [phone]  8/25/2017

## 2017-08-25 NOTE — PLAN OF CARE
Problem: Patient Care Overview  Goal: Plan of Care Review  Recommendations     Recommendation/Intervention:   TPN rate was decreased to 40ml/hr & is now only meeting 76% of EPN. Until pt is consistently eating >50% of meals, consider raising TPN rate back up to 50ml/hr.  If for volume reasons TPN needs to infuse at 40ml/hr, rec 9.5% amino acid & 15% amino acid so that at least protein needs are being met.     Goals: Meet >/=85% of EEN/EPN  Nutrition Goal Status: progressing towards goal

## 2017-08-25 NOTE — PT/OT/SLP PROGRESS
Physical Therapy  Treatment    Suman Hayden   MRN: [id number]   Admitting Diagnosis: LVAD (left ventricular assist device) present    PT Received On: 08/25/17  PT Start Time: 0819     PT Stop Time: 0848    PT Total Time (min): 29 min       Billable Minutes:  Gait [id number] min    Treatment Type: Other (see comments) (co-treatment with OT)  PT/PTA: PT     PTA Visit Number: 0       General Precautions: Standard, LVAD, sternal, fall  Orthopedic Precautions: N/A   Braces:      Do you have any cultural, spiritual, Oriental orthodox conflicts, given your current situation?: none noted     Subjective:  Communicated with nurse prior to session.      Pain/Comfort  Pain Rating 1: 0/10  Pain Rating Post-Intervention 1: 0/10    Objective:   Patient found with: telemetry, arterial line, delgado catheter, PICC line (LVAD, CVP, dialysis catheter R IJ, )    Functional Mobility:  Bed Mobility:   Rolling/Turning Right: Maximum assistance (Pt needed verbal cues for functional mobility with sternal precautions.)  Supine to Sit: Maximum Assistance    Transfers:  Sit <> Stand Assistance: Maximum Assistance (pt stood x 2 reps.)  Sit <> Stand Assistive Device: No Assistive Device    Gait:   Gait Distance: 3 ft with stooped posture and B flexed knees  Assistance 1: Total assistance (max x 2 )  Gait Assistive Device: Hand held assist        Therapeutic Activities and Exercises:  Pt performed AROM there exer BLE in sitting x 10 x 2 reps.      AM-PAC 6 CLICK MOBILITY  How much help from another person does this patient currently need?   1 = Unable, Total/Dependent Assistance  2 = A lot, Maximum/Moderate Assistance  3 = A little, Minimum/Contact Guard/Supervision  4 = None, Modified [place]/Independent    Turning over in bed (including adjusting bedclothes, sheets and blankets)?: 2  Sitting down on and standing up from a chair with arms (e.g., wheelchair, bedside commode, etc.): 2  Moving from lying on back to sitting on the side of the bed?:  2  Moving to and from a bed to a chair (including a wheelchair)?: 2  Need to walk in hospital room?: 2  Climbing 3-5 steps with a railing?: 1  Total Score: 11    AM-PAC Raw Score CMS G-Code Modifier Level of Impairment Assistance   6 % Total / Unable   7 - 9 CM 80 - 100% Maximal Assist   10 - 14 CL 60 - 80% Moderate Assist   15 - 19 CK 40 - 60% Moderate Assist   20 - 22 CJ 20 - 40% Minimal Assist   23 CI 1-20% SBA / CGA   24 CH 0% Independent/ Mod I     Patient left up in chair with all lines intact, call button in reach and wife. present.    Assessment:  Suman Hayden is a 67 y.o. male with a medical diagnosis of LVAD (left ventricular assist device) present and presents with decreased mobility, balance, transfers, strength and decreased distance ambulated. Pt would benefit from cont PT to address deficits and will need inpt rehab when medically stable. Pt will benefit from skilled PT 6x/wk to progress to highest functional level possible. Pt is s/p LVAD HM 3 placement 8/16, sternal washout 8/17, chest closure. 8/18. Pt was re-intubated 8/9 and extubated 8/13/17.    Rehab identified problem list/impairments: Rehab identified problem list/impairments: weakness, impaired endurance, impaired functional mobilty, gait instability, impaired balance    Rehab potential is good.    Activity tolerance: Good    Discharge recommendations: Discharge Facility/Level Of Care Needs: rehabilitation facility     Barriers to discharge: Barriers to Discharge: Decreased caregiver support (wife will not be able to assist at current functional level.)    Equipment recommendations: Equipment Needed After Discharge:  (will determine DME needs closer to discharge.)     GOALS:    Physical Therapy Goals        Problem: Physical Therapy Goal    Goal Priority Disciplines Outcome Goal Variances Interventions   Physical Therapy Goal     PT/OT, PT Ongoing (interventions implemented as appropriate)     Description:  Goals to be met by: 9/12/17      Patient will increase functional independence with mobility by performin. Supine to sit with MInimal Assistance- not met  2. Sit to supine with MInimal Assistance - not met  3. Sit to stand transfer with Minimal Assistance- not met  4. Bed to chair transfer with Minimal Assistance- not met  5. Gait  x 50 feet with Minimal Assistance. - not met  6. Lower extremity exercise program x15 reps, with supervision, in order to increase LE strength and (I) with functional mobility. - not met                        PLAN:    Patient to be seen 6 x/week  to address the above listed problems via gait training, therapeutic activities, therapeutic exercises  Plan of Care expires: 17  Plan of Care reviewed with: patient, spouse         Astridmichael Whaley, PT  2017

## 2017-08-25 NOTE — PLAN OF CARE
Problem: Patient Care Overview  Goal: Plan of Care Review  Outcome: Revised  Pt night uneventful.  Heparin remains at 500 units/hr, dobutamine at 5 mcg/kg/min, protonix at 4mg/hr, and lasix at 10mg/hr.  TPN and lipids infusing.  Denies c/o pain. Plan of care reviewed and discussed with patient and spouse, understanding verbalized.

## 2017-08-25 NOTE — PROGRESS NOTES
"[facility]  Heart Transplant  Progress Note    Patient Name: Suman Hayden  MRN: [id number]  Admission Date: 7/18/2017  Hospital Length of Stay: 38 days  Attending Physician: Sunny Downing MD  Primary Care Provider: Joe Ernst MD  Principal Problem:LVAD (left ventricular assist device) present    Subjective:     Interval History:  Pt still in bed this am. No complaints. Did not eat or drink yesterday because he "felt sick to my stomach"    Continuous Infusions:   DOBUTamine 5 mcg/kg/min (08/25/17 1400)    epinephrine infusion Stopped (08/15/17 1500)    furosemide (LASIX) 1 mg/mL infusion (non-titrating) 10 mg/hr (08/25/17 1400)    heparin (porcine) in 5 % dex 500 Units/hr (08/25/17 1400)    pantoprazole 40 mg in dextrose 5 % 100 mL infusion (ready to mix system) 2 mg/hr (08/25/17 1400)    TPN ADULT CENTRAL LINE CUSTOM 40 mL/hr at 08/25/17 1400    TPN ADULT CENTRAL LINE CUSTOM       Scheduled Meds:   amlodipine  2.5 mg Oral Daily    ertapenem (INVANZ) IVPB  0.5 g Intravenous Q24H    fat emulsion 20%  250 mL Intravenous Daily    potassium chloride  40 mEq Oral Daily    potassium chloride  40 mEq Oral Once    sodium chloride 0.9%  10 mL Intravenous Q6H    sodium chloride 0.9%  10 mL Intravenous Q6H    warfarin  5 mg Oral Once     PRN Meds:sodium chloride, albumin human 5%, albuterol sulfate, bisacodyl, dextrose 50%, glucagon (human recombinant), hydrALAZINE, insulin aspart, ondansetron, Flushing PICC Protocol **AND** sodium chloride 0.9% **AND** sodium chloride 0.9%, Flushing PICC Protocol **AND** sodium chloride 0.9% **AND** sodium chloride 0.9%    Review of patient's allergies indicates:  No Known Allergies  Objective:     Vital Signs (Most Recent):  Temp: 98.1 °F (36.7 °C) (08/25/17 1100)  Pulse: 80 (08/25/17 1445)  Resp: (!) 26 (08/25/17 1445)  BP: (!) 90/0 (08/25/17 1100)  SpO2: 100 % (08/25/17 1430) Vital Signs (24h Range):  Temp:  [98.1 °F (36.7 °C)-99.1 °F (37.3 °C)] 98.1 °F " (36.7 °C)  Pulse:  [] 80  Resp:  [23-44] 26  SpO2:  [95 %-100 %] 100 %  BP: (90-98)/(0) 90/0  Arterial Line BP: ()/(55-77) 101/67     Weight: 88.9 kg (195 lb 15.8 oz)  Body mass index is 29.8 kg/m².      Intake/Output Summary (Last 24 hours) at 08/25/17 1513  Last data filed at 08/25/17 1400   Gross per 24 hour   Intake          2405.47 ml   Output             4345 ml   Net         -1939.53 ml     Physical Exam     Constitutional: He appears well-developed and well-nourished.   HENT:   Head: Normocephalic and atraumatic.   Eyes: EOM are normal. Pupils are equal, round, and reactive to light.   Cardiovascular: Normal rate, regular rhythm and normal heart sounds.  Exam reveals no gallop and no friction rub.    No murmur heard.  + LVAD hum    Pulmonary/Chest: Effort normal. No respiratory distress. He has no wheezes. He has no rales.   Abdominal: Soft. Bowel sounds are normal.   Musculoskeletal: He exhibits no edema.   Neurological: He is alert.   Nursing note and vitals reviewed      Significant Labs:  CBC:    Recent Labs  Lab 08/25/17  1242   WBC 12.99*   RBC 2.67*   HGB 7.4*   HCT 23.1*      MCV 87   MCH 27.7   MCHC 32.0     BNP:    Recent Labs  Lab 08/25/17  0407   BNP 3,601*     CMP:    Recent Labs  Lab 08/25/17  1243   *   CALCIUM 8.5*   ALBUMIN 2.1*   PROT 6.6      K 3.7   CO2 25      BUN 97*   CREATININE 2.2*   ALKPHOS 199*   ALT 26   AST 31   BILITOT 1.7*      Coagulation:     Recent Labs  Lab 08/25/17  0407   INR 1.4*   APTT 31.1     LDH:    Recent Labs  Lab 08/23/17  0253 08/24/17  0445 08/25/17  0407   * 270* 327*     Microbiology:  Microbiology Results (last 7 days)     ** No results found for the last 168 hours. **          Estimated Creatinine Clearance: 35.3 mL/min (based on Cr of 2.2).        Assessment and Plan:     * LVAD (left ventricular assist device) present    - LVAD HM III. Placed this admit 7/27/17  - CTS Primary  - chest closure (7/28/17) and  extubated (7/29/17)  -Reintubated 8/9/17 and extubated 8/13/17  -Now on  5. Lasix drip started yesterday. CVP 13 this am  - Speed 5200  - LDH stable  - AC per CTS          Electrolyte abnormality    -Recommending electrolyte replacement to keep K around 4 and Mg around 2        Upper GI bleed    -GI following. EGD done 8/17/17 showed Mucosal changes in the duodenum, Clotted blood in the entire stomach. Hemorrhagic appearing mucosa in the stomach. Small hiatal hernia. LA Grade D erosive esophagitis.  -GI recommended Protonix drip, Recommend mesenteric artery evaluation and consider vascular surgery consult evaluation.        Bacteremia    -ESBL from blood culture 8/9   -Ertapenem, ID following        Atrial fibrillation    -AC, Amio per C TS          Atrial tachycardia    - [id number] - 3  -Rec restarting Amio. AC per CTS        AICD discharge    - appropriate in the setting of VT aggravated by underlying AT/AFL (earlier during the admission)  - 7/28 - setting of AF undersense - device reprogrammed to VVI 80  - tachy therapy turned off  - Rec restarting Amio  - EP planning to do lead revision         Hepatitis B core antibody positive since 2012    - will defer liver biopsy as CTS not requiring it  - ID consult cleared the patient for sx  - case discussed with hepatology, low suspicion for liver involvement        V-tach    - Rec restarting Amio        Hyperlipidemia    - Rec resuming pravastatin once liver enzymes stabilize             Susannah Elizabeth PA-C  Heart Transplant  [facility]

## 2017-08-25 NOTE — PT/OT/SLP PROGRESS
Occupational Therapy  Treatment    Suman Hayden   MRN: [id number]   Admitting Diagnosis: LVAD (left ventricular assist device) present    OT Date of Treatment: 08/25/17   OT Start Time: 0816  OT Stop Time: 0842  OT Total Time (min): 26 min    Billable Minutes:  Self Care/Home Management 10 and Therapeutic Activity 13    General Precautions: Standard, LVAD, fall, sternal  Orthopedic Precautions:    Braces:           Subjective:  Communicated with RN prior to session.  Pt emotional and tearing up during session  Pain/Comfort  Pain Rating 1: 0/10  Pain Rating Post-Intervention 1: 0/10    Objective:  Patient found with: PICC line, arterial line, delgado catheter, central line, telemetry     Functional Mobility:  Bed Mobility:  Rolling/Turning Right: Maximum assistance  Scooting/Bridging: Maximum Assistance (to EOB)  Supine to Sit: Maximum Assistance    Transfers:   Sit <> Stand Assistance: Maximum Assistance (from EOB and b/s chair)  Sit <> Stand Assistive Device: No Assistive Device  Bed <> Chair Technique: Stand Pivot  Bed <> Chair Transfer Assistance: Maximum Assistance  Bed <> Chair Assistive Device: No Assistive Device    Functional Ambulation: Max A x 2 with B HHA x three feet    Activities of Daily Living:     Feeding adaptive equipment:   UE Dressing Level of Assistance: Maximum assistance  UE adaptive equipment:   LE Dressing Level of Assistance: Total assistance (donning socks)  LE adaptive equipment:   Grooming Position: Seated  Grooming Level of Assistance: Stand by assistance  Toileting Where Assessed:  (standing EOB)  Toileting Level of Assistance: Total assistance     Therapeutic Activities and Exercises:  Pt sat EOB with CGA while engaged in grooming task and VAD management. Pt able to remove/replace controller and perform Self Test with minimal A d/t decreased functional use of hands from weakness and edema. Pt able to wash his face with SBA. Cueing needed for VAD management during transitions    AM-PAC 6  "CLICK ADL   How much help from another person does this patient currently need?   1 = Unable, Total/Dependent Assistance  2 = A lot, Maximum/Moderate Assistance  3 = A little, Minimum/Contact Guard/Supervision  4 = None, Modified [place]/Independent    Putting on and taking off regular lower body clothing? : 1  Bathing (including washing, rinsing, drying)?: 1  Toileting, which includes using toilet, bedpan, or urinal? : 1  Putting on and taking off regular upper body clothing?: 2  Taking care of personal grooming such as brushing teeth?: 3  Eating meals?: 2  Total Score: 10     AM-PAC Raw Score CMS "G-Code Modifier Level of Impairment Assistance   6 % Total / Unable   7 - 8 CM 80 - 100% Maximal Assist   9-13 CL 60 - 80% Moderate Assist   14 - 19 CK 40 - 60% Moderate Assist   20 - 22 CJ 20 - 40% Minimal Assist   23 CI 1-20% SBA / CGA   24 CH 0% Independent/ Mod I       Patient left up in chair with all lines intact, call button in reach, rn notified and spouse present    ASSESSMENT:  Suman Hayden is a 67 y.o. male with a medical diagnosis of LVAD (left ventricular assist device) present and presents with weakness, decreased coordination and decreased endurance affecting ADL (I). Pt frustrated and emotional this session, but remained motivated.    Rehab identified problem list/impairments: Rehab identified problem list/impairments: weakness, impaired endurance, impaired self care skills, impaired functional mobilty, gait instability, impaired balance, decreased upper extremity function, decreased lower extremity function, impaired cardiopulmonary response to activity, edema    Rehab potential is good.    Activity tolerance: Good    Discharge recommendations: Discharge Facility/Level Of Care Needs: rehabilitation facility     Barriers to discharge: Barriers to Discharge: Decreased caregiver support    Equipment recommendations:  (TBD closer to d/c)     GOALS:    Occupational Therapy Goals        Problem: " Occupational Therapy Goal    Goal Priority Disciplines Outcome Interventions   Occupational Therapy Goal     OT, PT/OT Ongoing (interventions implemented as appropriate)    Description:  Goals to be met by:  2 weeks 8/28/17    Patient will increase functional independence with ADLs by performing:  Feeding: Independent   UE Dressing with Supervision.  LE Dressing with Supervision.  Grooming while standing with Supervision.  Toileting from toilet with Supervision for hygiene and clothing management.   Stand pivot transfers with Supervision.  Toilet transfer to toilet with Supervision.  Pt will be supervision  with LVAD yasmin't.                  Multidisciplinary Problems (Resolved)        Problem: Occupational Therapy Goal    Goal Priority Disciplines Outcome Interventions   Occupational Therapy Goal   (Resolved)     OT, PT/OT  Error    Description:  Goals to be met by: 8/04/2017    Patient will increase functional independence with ADLs by performing:    Increased functional strength to 5/5 for improved ADL performance.  Upper extremity exercise program and R LE ankle pumps  3x 10 reps per handout, with independence.                      Plan:  Patient to be seen 6 x/week to address the above listed problems via self-care/home management, therapeutic activities, therapeutic exercises  Plan of Care expires: 08/29/17  Plan of Care reviewed with: patient, spouse         Sammi CASTILLO DELMY Conklin  08/25/2017

## 2017-08-25 NOTE — PROGRESS NOTES
[facility]  Adult Nutrition  Progress Note    SUMMARY     Recommendations    Recommendation/Intervention:   TPN rate was decreased to 40ml/hr & is now only meeting 76% of EPN. Until pt is consistently eating >50% of meals, consider raising TPN rate back up to 50ml/hr.  If for volume reasons TPN needs to infuse at 40ml/hr, rec 9.5% amino acid & 15% dextrose so that at least protein needs are being met.    Goals: Meet >/=85% of EEN/EPN  Nutrition Goal Status: progressing towards goal  Communication of RD Recs: other (comment) (discussed w/ pharmacist)      Reason for Assessment    Reason for Assessment: RD follow-up  Diagnosis:  (s/p LVAD)  Relevent Medical History: NICM, HTN, HLD   Interdisciplinary Rounds: attended     General Information Comments: Pt seen this a.m. Sitting up in chair at bedside but dozing off & on. RN reports pt has little to no interest in eating but did say he'd try mashed potatoes. ST working w/ pt. Diet upgraded to pureed. TPN cont's    Nutrition Discharge Planning: Unable to determine    Nutrition Prescription Ordered    Current Diet Order: NPO  Nutrition Order Comments: -  Current Nutrition Support Formula Ordered: Other (Comment) (Custom TPN: 8.5% amino acid/20% dextrose)  Current Nutrition Support Rate Ordered: 40 (ml)  Current Nutrition Support Frequency Ordered: mL/hr  Oral Nutrition Supplement: -     Evaluation of Received Nutrients/Fluid Intake            Parenteral Calories (kcal): 979  Parenteral Protein (gm): 82  Parenteral Fluid (mL): 1200           Other Calories (kcal): 0  Total Calories (kcal): 1479     Energy Calories Required: not meeting needs  % Kcal Needs: 76              Protein Required: meeting needs  % Protein Needs: 85     IV Fluid (mL):  (IVPB meds)     GIR (Glucose Infusion Rate) (mg/kg/min): 1.67 mg/kg/min  Lipid Calories (kcals): 500 kcals (via 250 mls 20% intralipid)  I/O: -1.728L x 24 hrs         Fluid Required: other (see comments) (per  "MD)  Comments: LBM ; good uop  Tolerance: tolerating  % Intake of Estimated Energy Needs: 75 - 100 %  % Meal Intake: NPO     Nutrition Risk Screen     Nutrition Risk Screen: no indicators present    Nutrition/Diet History    Patient Reported Diet/Restrictions/Preferences: general, low salt  Typical Food/Fluid Intake: TPN; diet upgraded to Purreed today  Food Preferences: MARIE        Factors Affecting Nutritional Intake: decreased appetite, difficulty/impaired swallowing                Labs/Tests/Procedures/Meds       Pertinent Labs Reviewed: reviewed  Pertinent Labs Comments: K 3.4, BUN 96, Crt 2.3, BNP 3601, tbili 1.6, CRT 89.5, Prealb 13, Alb 2.0  Pertinent Medications Reviewed: reviewed  Pertinent Medications Comments: lasix, pantoprazole, KCl, warfarin    Physical Findings    Overall Physical Appearance: lethargic  Tubes: other (see comments) (none)  Oral/Mouth Cavity: tooth/teeth missing  Skin: incision (chest incision)    Anthropometrics    Temp: 98.1 °F (36.7 °C)     Height: 5' 8" (172.7 cm)  Weight Method: Bed Scale  Weight: 88.9 kg (195 lb 15.8 oz)  Ideal Body Weight (IBW), Male: 154 lb     % Ideal Body Weight, Male (lb): 127.27 lb     BMI (Calculated): 29.9  BMI Grade: 25 - 29.9 - overweight     Usual Body Weight (UBW), k.8 kg (admit wt)     % Usual Body Weight: 115.78  % Weight Change From Usual Weight: -15.78 %             Estimated/Assessed Needs    Weight Used For Calorie Calculations: 79.9 kg (176 lb 2.4 oz) (dosing wt)      Energy Calorie Requirements (kcal):   Energy Need Method: [place]-St Jeor (x 1.25 (PAL))       RMR ([place]-St. Jeor Equation): 1548.5        Weight Used For Protein Calculations: 79.9 kg (176 lb 2.4 oz) (dosing wt)  Protein Requirements:  gms (1.2-1.4 gms/kg dosing wt)       Fluid Need Method: RDA Method, other (see comments) (Per MD or 1 mL/kcal)        RDA Method (mL):                Assessment and Plan    Nutrition Problem  Inadequate energy " intake     Related to (etiology):   suboptimal PN provision      Signs and Symptoms (as evidenced by):   <85% of EPN being met     Interventions/Recommendations (treatment strategy):  See recs     Nutrition Diagnosis Status:   New    Monitor and Evaluation    Food and Nutrient Intake: parenteral nutrition intake, energy intake  Food and Nutrient Adminstration: diet order, enteral and parenteral nutrition administration     Physical Activity and Function: nutrition-related ADLs and IADLs  Anthropometric Measurements: weight, weight change, body mass index  Biochemical Data, Medical Tests and Procedures: gastrointestinal profile, electrolyte and renal panel, glucose/endocrine profile, lipid profile, inflammatory profile  Nutrition-Focused Physical Findings: overall appearance    Nutrition Risk    Level of Risk: other (see comments) (2x/week)    Nutrition Follow-Up    RD Follow-up?: Yes

## 2017-08-25 NOTE — PROGRESS NOTES
VSS throughout shift.  Pt on 5mcg/kg/min Dobutamine, 500U/hr Heparin, 10mg/hr Lasix, 2mg/hr protonix, and 40/hr TPN. Pt OOBTC for 6 hrs.  VAD drsg changed per spouse.  Advanced to pureed diet.  Pt free from fall/injury throughout shift.      Skin note: Sacral pressure ulcer drsg changed.  No breakdown/redness noted on back of head, elbows, or heels.

## 2017-08-25 NOTE — ASSESSMENT & PLAN NOTE
- LVAD HM III. Placed this admit 7/27/17  - CTS Primary  - chest closure (7/28/17) and extubated (7/29/17)  -Reintubated 8/9/17 and extubated 8/13/17  -Now on  5. Lasix drip started yesterday. CVP 13 this am  - Speed 5200  - LDH stable  - AC per CTS

## 2017-08-25 NOTE — PROGRESS NOTES
Progress Note  Surgical Intensive Care    Admit Date: 7/18/2017  Post-operative Day: 8 Days Post-Op  Hospital Day: 39    SUBJECTIVE:     Follow-up For:  Procedure(s) (LRB):  ESOPHAGOGASTRODUODENOSCOPY (EGD) (N/A)   S/p sternotomy closure 7/28/17    Interval history: No acute events overnight. VSS. On 2L NC. TPN for nutrition, having difficulty tolerating PO. Currently on Dobutatmine 5. Sitting in chair this morning.      Scheduled Meds:   amlodipine  2.5 mg Oral Daily    ertapenem (INVANZ) IVPB  0.5 g Intravenous Q24H    potassium chloride  40 mEq Oral Daily    sodium chloride 0.9%  10 mL Intravenous Q6H    sodium chloride 0.9%  10 mL Intravenous Q6H     Continuous Infusions:   DOBUTamine 5 mcg/kg/min (08/25/17 1000)    epinephrine infusion Stopped (08/15/17 1500)    furosemide (LASIX) 5 mg/mL infusion (non-titrating) 10 mg/hr (08/25/17 0900)    heparin (porcine) in 5 % dex 500 Units/hr (08/25/17 1000)    pantoprazole 40 mg in dextrose 5 % 100 mL infusion (ready to mix system) 4 mg/hr (08/25/17 1000)    TPN ADULT CENTRAL LINE CUSTOM 40 mL/hr at 08/25/17 1000     PRN Meds:.sodium chloride, albumin human 5%, albuterol sulfate, bisacodyl, dextrose 50%, glucagon (human recombinant), hydrALAZINE, insulin aspart, ondansetron, Flushing PICC Protocol **AND** sodium chloride 0.9% **AND** sodium chloride 0.9%, Flushing PICC Protocol **AND** sodium chloride 0.9% **AND** sodium chloride 0.9%  Review of patient's allergies indicates:  No Known Allergies    OBJECTIVE:     Vital Signs (Most Recent)  Temp: 98.8 °F (37.1 °C) (08/25/17 0715)  Pulse: 80 (08/25/17 1000)  Resp: (!) 25 (08/25/17 1000)  BP: (!) 90/0 (08/25/17 0715)  SpO2: 99 % (08/25/17 1000)    Vital Signs Range (Last 24H):  Temp:  [97.8 °F (36.6 °C)-99.1 °F (37.3 °C)]   Pulse:  [79-95]   Resp:  [22-44]   BP: ()/(0)   SpO2:  [92 %-100 %]   Arterial Line BP: ()/(55-77)     I & O (Last 24H):    Intake/Output Summary (Last 24 hours) at 08/25/17  1057  Last data filed at 08/25/17 1000   Gross per 24 hour   Intake          2405.47 ml   Output             4555 ml   Net         -2149.53 ml        Physical Exam    Constitutional: He appears well-developed and well-nourished. No distress. He is alert  HENT:   Head: Normocephalic and atraumatic.    Neck: Normal range of motion. Neck supple.   Cardiovascular: Intact distal pulses.    LVAD hum present.   Pulmonary/Chest: Effort normal. No respiratory distress..   Abdominal: Soft. He exhibits no distension.   Skin: Skin is warm and dry.     Laboratory (Last 24H):  CBC:    Recent Labs  Lab 08/25/17  0407   WBC 14.18*   HGB 7.2*   HCT 21.9*        CMP:    Recent Labs  Lab 08/25/17  0407   CALCIUM 8.6*   ALBUMIN 2.0*  2.0*   PROT 6.4  6.4      K 3.4*   CO2 24      BUN 96*   CREATININE 2.3*   ALKPHOS 202*  202*   ALT 26  26   AST 31  31   BILITOT 1.6*  1.6*     Echo    CONCLUSIONS     1 - Heartmate III LVAD; speed 5200; aortic valve opens with every beat.     2 - Eccentric LVH severely depressed left ventricular systolic function (EF 20-25%).     3 - Severe left atrial enlargement.     4 - Restrictive LV filling pattern, indicating markedly elevated LAP (grade 3 diastolic dysfunction).     5 - Mild mitral regurgitation.     6 - Mild tricuspid regurgitation.     7 - Pulmonary hypertension. The estimated PA systolic pressure is 44 mmHg.     8 - Increased central venous pressure.     ASSESSMENT/PLAN:     Neuro:  - alert and responding to commands  - sedation off  -no c/o pain  -up in chair yesterday and today    Resp:  - stable on 2L NC  - Possible aspiration event causing sepsis - resp cultures growing ESBL   - wean supplemental O2 as tolerated     CV:  - HDS; LVAD numbers stable  - Dobutatmine 5, mgmt per CTS  - epi and levo off     Heme:  - Hgb/Hct 7.2/21.9  - Continue to trend labs  - INR down to 1.3  - Heparin 500u/hr    ID:  - afebrile  - WBC 15.19  - Likely aspiration pneumonia   - bacteremic  and resp cultures ESBL+, on ertapenem  - ID consulted  - blood cx 8/11 NGTD    Renal:  - Singer in place  - UOP 4.2 yesterday  - Strict I/Os   - Lasix drip at 10mg/hr  - BUN/Cr trending down 96/2.2    FEN/GI:  - Currently on Protonix drip; Consider transitioning to BID  - Replace lytes PRN     Endo:  - Endocrine following  - Accuchecks  - SSI     Dispo:  - wean drips as tolerated per CTS  -plan to stepdown Monday    Vince Murguia PGY-1  [phone]  08/25/2017

## 2017-08-25 NOTE — PROCEDURES
TXP LVAD INTERROGATIONS 8/25/2017 8/25/2017 8/25/2017 8/25/2017 8/25/2017 8/25/2017 8/25/2017   Type HeartMate3 HeartMate3 HeartMate3 HeartMate3 HeartMate3 HeartMate3 -   Flow 4.4 4.3 4.3 4.3 4.3 4.4 4.4   Speed 5200 5200 5200 5200 5200 5200 5200   PI 3.5 3.5 3.4 3.5 3.6 3.5 3.5   Power ([place]) 3.6 3.6 3.6 3.5 3.5 3.6 3.6   LSL - 4800 - - - 4800 -   Pulsatility Intermittent pulse Intermittent pulse Intermittent pulse Intermittent pulse Intermittent pulse Intermittent pulse -   Some recent data might be hidden     VAD sounds HM 3   NO alarms noted in history  HCT=21.9  Non pulsatile    Pt  And wife AAAO.  Talked with pt about his self test, why and how he does it.  Also educated on green arrows.  Silverio at bedside to educate on equipment.  Will check back on Monday

## 2017-08-25 NOTE — PROGRESS NOTES
VSS thoughout shift.  Pt Aox3-disoriented to time.  Heparin- 500U/hr, Dobutamine-5mcg/kg/hr, TPN- 50ml/hr, Protonix- 4mg/hr, Lasix-10mg/hr. UOP adequate; 3 bowel movements throughout shift.  Pt OOBTC for 6hrs; tolerated well.     Skin note:  Pressure ulcer noted on sacrum; dressing changed.  No breakdown noted on backs of heels, head, or back.

## 2017-08-25 NOTE — ASSESSMENT & PLAN NOTE
-GI following. EGD done 8/17/17 showed Mucosal changes in the duodenum, Clotted blood in the entire stomach. Hemorrhagic appearing mucosa in the stomach. Small hiatal hernia. LA Grade D erosive esophagitis.  -GI recommended Protonix drip, Recommend mesenteric artery evaluation and consider vascular surgery consult evaluation.

## 2017-08-25 NOTE — PROGRESS NOTES
Seen pt in hospital rm [id number]. Educated pt and caregiver on LVAD equipment and monthly checklist. Will follow up with pt next week.

## 2017-08-25 NOTE — PLAN OF CARE
Problem: SLP Goal  Goal: SLP Goal  Speech Language Pathology Goals  Goals expected to be met by 8/28  1. Pt will tolerate thin liquids with no overt s/s of aspiration.   2. Pt will tolerate dental soft diet with adequate a-p transit and no overt s/s of aspiration         Outcome: Ongoing (interventions implemented as appropriate)  Continue current plan of care. Goals remain appropriate. SHERRI Lofton, CCC-SLP, [facility] 8/25/2017

## 2017-08-26 LAB
ALBUMIN SERPL BCP-MCNC: 2 G/DL
ALBUMIN SERPL BCP-MCNC: 2.1 G/DL
ALBUMIN SERPL BCP-MCNC: 2.1 G/DL
ALBUMIN SERPL BCP-MCNC: 2.2 G/DL
ALP SERPL-CCNC: 178 U/L
ALP SERPL-CCNC: 183 U/L
ALP SERPL-CCNC: 187 U/L
ALP SERPL-CCNC: 189 U/L
ALT SERPL W/O P-5'-P-CCNC: 23 U/L
ALT SERPL W/O P-5'-P-CCNC: 25 U/L
ALT SERPL W/O P-5'-P-CCNC: 25 U/L
ALT SERPL W/O P-5'-P-CCNC: 26 U/L
ANION GAP SERPL CALC-SCNC: 11 MMOL/L
ANION GAP SERPL CALC-SCNC: 12 MMOL/L
ANION GAP SERPL CALC-SCNC: 12 MMOL/L
ANION GAP SERPL CALC-SCNC: 13 MMOL/L
ANISOCYTOSIS BLD QL SMEAR: SLIGHT
ANISOCYTOSIS BLD QL SMEAR: SLIGHT
APTT BLDCRRT: 29.7 SEC
AST SERPL-CCNC: 29 U/L
AST SERPL-CCNC: 29 U/L
AST SERPL-CCNC: 30 U/L
AST SERPL-CCNC: 31 U/L
BASO STIPL BLD QL SMEAR: ABNORMAL
BASO STIPL BLD QL SMEAR: ABNORMAL
BASOPHILS # BLD AUTO: 0.04 K/UL
BASOPHILS # BLD AUTO: 0.05 K/UL
BASOPHILS # BLD AUTO: 0.05 K/UL
BASOPHILS NFR BLD: 0.3 %
BASOPHILS NFR BLD: 0.4 %
BASOPHILS NFR BLD: 0.4 %
BILIRUB SERPL-MCNC: 1.6 MG/DL
BILIRUB SERPL-MCNC: 1.7 MG/DL
BILIRUB SERPL-MCNC: 1.8 MG/DL
BILIRUB SERPL-MCNC: 1.9 MG/DL
BLD PROD TYP BPU: NORMAL
BLOOD UNIT EXPIRATION DATE: NORMAL
BLOOD UNIT TYPE CODE: 5100
BLOOD UNIT TYPE: NORMAL
BUN SERPL-MCNC: 88 MG/DL
BUN SERPL-MCNC: 91 MG/DL
BUN SERPL-MCNC: 92 MG/DL
BUN SERPL-MCNC: 95 MG/DL
CALCIUM SERPL-MCNC: 8.5 MG/DL
CALCIUM SERPL-MCNC: 8.5 MG/DL
CALCIUM SERPL-MCNC: 8.7 MG/DL
CALCIUM SERPL-MCNC: 8.7 MG/DL
CHLORIDE SERPL-SCNC: 107 MMOL/L
CHLORIDE SERPL-SCNC: 108 MMOL/L
CHLORIDE SERPL-SCNC: 108 MMOL/L
CHLORIDE SERPL-SCNC: 109 MMOL/L
CO2 SERPL-SCNC: 26 MMOL/L
CO2 SERPL-SCNC: 26 MMOL/L
CO2 SERPL-SCNC: 28 MMOL/L
CO2 SERPL-SCNC: 28 MMOL/L
CODING SYSTEM: NORMAL
CREAT SERPL-MCNC: 2 MG/DL
CREAT SERPL-MCNC: 2.1 MG/DL
DIFFERENTIAL METHOD: ABNORMAL
DISPENSE STATUS: NORMAL
EOSINOPHIL # BLD AUTO: 0.2 K/UL
EOSINOPHIL # BLD AUTO: 0.2 K/UL
EOSINOPHIL # BLD AUTO: 0.3 K/UL
EOSINOPHIL NFR BLD: 1.8 %
EOSINOPHIL NFR BLD: 1.9 %
EOSINOPHIL NFR BLD: 2.2 %
EOSINOPHIL NFR BLD: 2.2 %
EOSINOPHIL NFR BLD: 2.7 %
ERYTHROCYTE [DISTWIDTH] IN BLOOD BY AUTOMATED COUNT: 16.8 %
ERYTHROCYTE [DISTWIDTH] IN BLOOD BY AUTOMATED COUNT: 16.9 %
ERYTHROCYTE [DISTWIDTH] IN BLOOD BY AUTOMATED COUNT: 16.9 %
ERYTHROCYTE [DISTWIDTH] IN BLOOD BY AUTOMATED COUNT: 17.1 %
ERYTHROCYTE [DISTWIDTH] IN BLOOD BY AUTOMATED COUNT: 17.6 %
EST. GFR  (AFRICAN AMERICAN): 36.6 ML/MIN/1.73 M^2
EST. GFR  (AFRICAN AMERICAN): 38.8 ML/MIN/1.73 M^2
EST. GFR  (NON AFRICAN AMERICAN): 31.6 ML/MIN/1.73 M^2
EST. GFR  (NON AFRICAN AMERICAN): 33.5 ML/MIN/1.73 M^2
GIANT PLATELETS BLD QL SMEAR: PRESENT
GLUCOSE SERPL-MCNC: 100 MG/DL
GLUCOSE SERPL-MCNC: 111 MG/DL
GLUCOSE SERPL-MCNC: 92 MG/DL
GLUCOSE SERPL-MCNC: 98 MG/DL
HCT VFR BLD AUTO: 21 %
HCT VFR BLD AUTO: 23.5 %
HCT VFR BLD AUTO: 23.7 %
HCT VFR BLD AUTO: 24.2 %
HCT VFR BLD AUTO: 24.9 %
HGB BLD-MCNC: 6.8 G/DL
HGB BLD-MCNC: 7.7 G/DL
HGB BLD-MCNC: 7.9 G/DL
HGB BLD-MCNC: 7.9 G/DL
HGB BLD-MCNC: 8 G/DL
HYPOCHROMIA BLD QL SMEAR: ABNORMAL
HYPOCHROMIA BLD QL SMEAR: ABNORMAL
INR PPP: 1.5
LDH SERPL L TO P-CCNC: 301 U/L
LYMPHOCYTES # BLD AUTO: 1.3 K/UL
LYMPHOCYTES # BLD AUTO: 1.4 K/UL
LYMPHOCYTES # BLD AUTO: 1.5 K/UL
LYMPHOCYTES # BLD AUTO: 1.6 K/UL
LYMPHOCYTES # BLD AUTO: 1.7 K/UL
LYMPHOCYTES NFR BLD: 11 %
LYMPHOCYTES NFR BLD: 11.4 %
LYMPHOCYTES NFR BLD: 12.4 %
LYMPHOCYTES NFR BLD: 12.5 %
LYMPHOCYTES NFR BLD: 13.9 %
MAGNESIUM SERPL-MCNC: 1.9 MG/DL
MCH RBC QN AUTO: 27.8 PG
MCH RBC QN AUTO: 28 PG
MCH RBC QN AUTO: 28.8 PG
MCHC RBC AUTO-ENTMCNC: 31.7 G/DL
MCHC RBC AUTO-ENTMCNC: 32.4 G/DL
MCHC RBC AUTO-ENTMCNC: 32.5 G/DL
MCHC RBC AUTO-ENTMCNC: 33.1 G/DL
MCHC RBC AUTO-ENTMCNC: 33.6 G/DL
MCV RBC AUTO: 86 FL
MCV RBC AUTO: 86 FL
MCV RBC AUTO: 87 FL
MCV RBC AUTO: 88 FL
MCV RBC AUTO: 89 FL
MONOCYTES # BLD AUTO: 0.8 K/UL
MONOCYTES # BLD AUTO: 0.8 K/UL
MONOCYTES # BLD AUTO: 0.9 K/UL
MONOCYTES NFR BLD: 6.6 %
MONOCYTES NFR BLD: 7 %
MONOCYTES NFR BLD: 7.2 %
MONOCYTES NFR BLD: 7.2 %
MONOCYTES NFR BLD: 7.5 %
NEUTROPHILS # BLD AUTO: 10.6 K/UL
NEUTROPHILS # BLD AUTO: 8.8 K/UL
NEUTROPHILS # BLD AUTO: 9.3 K/UL
NEUTROPHILS NFR BLD: 75.4 %
NEUTROPHILS NFR BLD: 77.9 %
NEUTROPHILS NFR BLD: 78.3 %
NEUTROPHILS NFR BLD: 78.7 %
NEUTROPHILS NFR BLD: 79.3 %
OVALOCYTES BLD QL SMEAR: ABNORMAL
OVALOCYTES BLD QL SMEAR: ABNORMAL
PHOSPHATE SERPL-MCNC: 4.1 MG/DL
PLATELET # BLD AUTO: 252 K/UL
PLATELET # BLD AUTO: 253 K/UL
PLATELET # BLD AUTO: 254 K/UL
PLATELET # BLD AUTO: 259 K/UL
PLATELET # BLD AUTO: 264 K/UL
PLATELET BLD QL SMEAR: ABNORMAL
PLATELET BLD QL SMEAR: ABNORMAL
PMV BLD AUTO: 11.5 FL
PMV BLD AUTO: 11.7 FL
PMV BLD AUTO: 11.8 FL
PMV BLD AUTO: 12 FL
PMV BLD AUTO: 12.5 FL
POIKILOCYTOSIS BLD QL SMEAR: SLIGHT
POIKILOCYTOSIS BLD QL SMEAR: SLIGHT
POLYCHROMASIA BLD QL SMEAR: ABNORMAL
POLYCHROMASIA BLD QL SMEAR: ABNORMAL
POTASSIUM SERPL-SCNC: 3.9 MMOL/L
POTASSIUM SERPL-SCNC: 3.9 MMOL/L
POTASSIUM SERPL-SCNC: 4 MMOL/L
POTASSIUM SERPL-SCNC: 4.1 MMOL/L
PROT SERPL-MCNC: 6.3 G/DL
PROT SERPL-MCNC: 6.6 G/DL
PROT SERPL-MCNC: 6.6 G/DL
PROT SERPL-MCNC: 6.8 G/DL
PROTHROMBIN TIME: 15.3 SEC
RBC # BLD AUTO: 2.43 M/UL
RBC # BLD AUTO: 2.67 M/UL
RBC # BLD AUTO: 2.74 M/UL
RBC # BLD AUTO: 2.78 M/UL
RBC # BLD AUTO: 2.84 M/UL
SCHISTOCYTES BLD QL SMEAR: ABNORMAL
SCHISTOCYTES BLD QL SMEAR: PRESENT
SODIUM SERPL-SCNC: 145 MMOL/L
SODIUM SERPL-SCNC: 146 MMOL/L
SODIUM SERPL-SCNC: 147 MMOL/L
SODIUM SERPL-SCNC: 150 MMOL/L
SPHEROCYTES BLD QL SMEAR: ABNORMAL
TARGETS BLD QL SMEAR: ABNORMAL
TARGETS BLD QL SMEAR: ABNORMAL
TOXIC GRANULES BLD QL SMEAR: PRESENT
TRANS ERYTHROCYTES VOL PATIENT: NORMAL ML
WBC # BLD AUTO: 11.48 K/UL
WBC # BLD AUTO: 11.86 K/UL
WBC # BLD AUTO: 12.01 K/UL
WBC # BLD AUTO: 12.35 K/UL
WBC # BLD AUTO: 13.54 K/UL

## 2017-08-26 PROCEDURE — 83735 ASSAY OF MAGNESIUM: CPT

## 2017-08-26 PROCEDURE — 85610 PROTHROMBIN TIME: CPT

## 2017-08-26 PROCEDURE — 63600175 PHARM REV CODE 636 W HCPCS: Performed by: GENERAL PRACTICE

## 2017-08-26 PROCEDURE — 97535 SELF CARE MNGMENT TRAINING: CPT

## 2017-08-26 PROCEDURE — 97110 THERAPEUTIC EXERCISES: CPT

## 2017-08-26 PROCEDURE — 63600175 PHARM REV CODE 636 W HCPCS: Performed by: STUDENT IN AN ORGANIZED HEALTH CARE EDUCATION/TRAINING PROGRAM

## 2017-08-26 PROCEDURE — 80053 COMPREHEN METABOLIC PANEL: CPT

## 2017-08-26 PROCEDURE — 97530 THERAPEUTIC ACTIVITIES: CPT

## 2017-08-26 PROCEDURE — 99232 SBSQ HOSP IP/OBS MODERATE 35: CPT | Mod: GC,,, | Performed by: ANESTHESIOLOGY

## 2017-08-26 PROCEDURE — 94760 N-INVAS EAR/PLS OXIMETRY 1: CPT

## 2017-08-26 PROCEDURE — 36430 TRANSFUSION BLD/BLD COMPNT: CPT

## 2017-08-26 PROCEDURE — 63600175 PHARM REV CODE 636 W HCPCS: Performed by: THORACIC SURGERY (CARDIOTHORACIC VASCULAR SURGERY)

## 2017-08-26 PROCEDURE — 99232 SBSQ HOSP IP/OBS MODERATE 35: CPT | Mod: ,,, | Performed by: INTERNAL MEDICINE

## 2017-08-26 PROCEDURE — A4216 STERILE WATER/SALINE, 10 ML: HCPCS | Performed by: THORACIC SURGERY (CARDIOTHORACIC VASCULAR SURGERY)

## 2017-08-26 PROCEDURE — 25000003 PHARM REV CODE 250: Performed by: THORACIC SURGERY (CARDIOTHORACIC VASCULAR SURGERY)

## 2017-08-26 PROCEDURE — 25000003 PHARM REV CODE 250: Performed by: INTERNAL MEDICINE

## 2017-08-26 PROCEDURE — B4185 PARENTERAL SOL 10 GM LIPIDS: HCPCS | Performed by: STUDENT IN AN ORGANIZED HEALTH CARE EDUCATION/TRAINING PROGRAM

## 2017-08-26 PROCEDURE — 25000003 PHARM REV CODE 250: Performed by: STUDENT IN AN ORGANIZED HEALTH CARE EDUCATION/TRAINING PROGRAM

## 2017-08-26 PROCEDURE — P9021 RED BLOOD CELLS UNIT: HCPCS

## 2017-08-26 PROCEDURE — 20000000 HC ICU ROOM

## 2017-08-26 PROCEDURE — C9113 INJ PANTOPRAZOLE SODIUM, VIA: HCPCS | Performed by: THORACIC SURGERY (CARDIOTHORACIC VASCULAR SURGERY)

## 2017-08-26 PROCEDURE — 85730 THROMBOPLASTIN TIME PARTIAL: CPT

## 2017-08-26 PROCEDURE — 83615 LACTATE (LD) (LDH) ENZYME: CPT

## 2017-08-26 PROCEDURE — 85025 COMPLETE CBC W/AUTO DIFF WBC: CPT | Mod: 91

## 2017-08-26 PROCEDURE — 63600175 PHARM REV CODE 636 W HCPCS: Performed by: INTERNAL MEDICINE

## 2017-08-26 PROCEDURE — 27000248 HC VAD-ADDITIONAL DAY

## 2017-08-26 PROCEDURE — 84100 ASSAY OF PHOSPHORUS: CPT

## 2017-08-26 RX ORDER — WARFARIN SODIUM 5 MG/1
5 TABLET ORAL ONCE
Status: COMPLETED | OUTPATIENT
Start: 2017-08-26 | End: 2017-08-26

## 2017-08-26 RX ORDER — HYDROCODONE BITARTRATE AND ACETAMINOPHEN 500; 5 MG/1; MG/1
TABLET ORAL
Status: DISCONTINUED | OUTPATIENT
Start: 2017-08-26 | End: 2017-08-28

## 2017-08-26 RX ADMIN — POTASSIUM CHLORIDE 40 MEQ: 1500 TABLET, EXTENDED RELEASE ORAL at 09:08

## 2017-08-26 RX ADMIN — AMLODIPINE BESYLATE 2.5 MG: 2.5 TABLET ORAL at 09:08

## 2017-08-26 RX ADMIN — FUROSEMIDE 10 MG/HR: 10 INJECTION, SOLUTION INTRAMUSCULAR; INTRAVENOUS at 10:08

## 2017-08-26 RX ADMIN — DOBUTAMINE IN DEXTROSE 5 MCG/KG/MIN: 400 INJECTION, SOLUTION INTRAVENOUS at 10:08

## 2017-08-26 RX ADMIN — Medication 10 ML: at 05:08

## 2017-08-26 RX ADMIN — PANTOPRAZOLE SODIUM 2 MG/HR: 40 INJECTION, POWDER, FOR SOLUTION INTRAVENOUS at 05:08

## 2017-08-26 RX ADMIN — SODIUM CHLORIDE 0.5 G: 9 INJECTION, SOLUTION INTRAVENOUS at 08:08

## 2017-08-26 RX ADMIN — PANTOPRAZOLE SODIUM 2 MG/HR: 40 INJECTION, POWDER, FOR SOLUTION INTRAVENOUS at 10:08

## 2017-08-26 RX ADMIN — SOYBEAN OIL 250 ML: 20 INJECTION, SOLUTION INTRAVENOUS at 09:08

## 2017-08-26 RX ADMIN — WARFARIN SODIUM 5 MG: 5 TABLET ORAL at 05:08

## 2017-08-26 RX ADMIN — CALCIUM GLUCONATE: 94 INJECTION, SOLUTION INTRAVENOUS at 09:08

## 2017-08-26 RX ADMIN — HYDRALAZINE HYDROCHLORIDE 10 MG: 20 INJECTION INTRAMUSCULAR; INTRAVENOUS at 04:08

## 2017-08-26 RX ADMIN — HEPARIN SODIUM AND DEXTROSE 500 UNITS/HR: 10000; 5 INJECTION INTRAVENOUS at 11:08

## 2017-08-26 NOTE — PLAN OF CARE
Problem: Patient Care Overview  Goal: Plan of Care Review  Outcome: Ongoing (interventions implemented as appropriate)  Plan of care reviewed with patient and patient's spouse.  Remained on room air throughout the day with 02 sats >95%.  OOB to chair x6 hours this afternoon, tolerated well.  Heparin, lasix, dobutamine, TPN and protonix gtts continued.  UO adequate.  2 BMs today.  VAD functioning properly with flows, index and prasad WNL and no alarms on this shift.  VSS throughout the day, see flowsheet for full assessment.

## 2017-08-26 NOTE — PROGRESS NOTES
Progress Note  Surgical Intensive Care    Admit Date: 7/18/2017  Post-operative Day: 9 Days Post-Op  Hospital Day: 40    SUBJECTIVE:     Follow-up For:  Procedure(s) (LRB):  ESOPHAGOGASTRODUODENOSCOPY (EGD) (N/A)   S/p sternotomy closure 7/28/17    Interval history: No acute events overnight. VSS. Dobutatmine 5. Lasix gtt at 10. Transfused 1 U pRBCs overnight.       Scheduled Meds:   amlodipine  2.5 mg Oral Daily    ertapenem (INVANZ) IVPB  0.5 g Intravenous Q24H    fat emulsion 20%  250 mL Intravenous Daily    potassium chloride  40 mEq Oral Daily    sodium chloride 0.9%  10 mL Intravenous Q6H    sodium chloride 0.9%  10 mL Intravenous Q6H     Continuous Infusions:   DOBUTamine 5 mcg/kg/min (08/26/17 0600)    epinephrine infusion Stopped (08/15/17 1500)    furosemide (LASIX) 1 mg/mL infusion (non-titrating) 10 mg/hr (08/26/17 0600)    heparin (porcine) in 5 % dex 500 Units/hr (08/26/17 0600)    pantoprazole 40 mg in dextrose 5 % 100 mL infusion (ready to mix system) 2 mg/hr (08/26/17 0600)    TPN ADULT CENTRAL LINE CUSTOM 40 mL/hr at 08/26/17 0600     PRN Meds:.sodium chloride, sodium chloride, albumin human 5%, albuterol sulfate, bisacodyl, dextrose 50%, glucagon (human recombinant), hydrALAZINE, insulin aspart, ondansetron, Flushing PICC Protocol **AND** sodium chloride 0.9% **AND** sodium chloride 0.9%, Flushing PICC Protocol **AND** sodium chloride 0.9% **AND** sodium chloride 0.9%  Review of patient's allergies indicates:  No Known Allergies    OBJECTIVE:     Vital Signs (Most Recent)  Temp: 98.8 °F (37.1 °C) (08/26/17 0400)  Pulse: 88 (08/26/17 0600)  Resp: (!) 26 (08/26/17 0600)  BP: (!) 92/0 (08/26/17 0357)  SpO2: 97 % (08/26/17 0600)    Vital Signs Range (Last 24H):  Temp:  [98.1 °F (36.7 °C)-99.3 °F (37.4 °C)]   Pulse:  []   Resp:  [22-48]   BP: (90-92)/(0)   SpO2:  [95 %-100 %]   Arterial Line BP: ()/(52-77)     I & O (Last 24H):    Intake/Output Summary (Last 24 hours) at  08/26/17 0702  Last data filed at 08/26/17 0600   Gross per 24 hour   Intake           2405.2 ml   Output             4595 ml   Net          -2189.8 ml        Physical Exam    Constitutional: He appears well-developed and well-nourished. No distress. He is alert  HENT:   Head: Normocephalic and atraumatic.    Neck: Normal range of motion. Neck supple.   Cardiovascular: Intact distal pulses.    LVAD hum present.   Pulmonary/Chest: Effort normal. No respiratory distress..   Abdominal: Soft. He exhibits no distension.   Skin: Skin is warm and dry.     Laboratory (Last 24H):  CBC:    Recent Labs  Lab 08/26/17  0408   WBC 13.54*   HGB 7.9*   HCT 23.5*        CMP:    Recent Labs  Lab 08/26/17  0408   CALCIUM 8.5*   ALBUMIN 2.1*   PROT 6.6   *   K 3.9   CO2 28      BUN 91*   CREATININE 2.0*   ALKPHOS 189*   ALT 26   AST 31   BILITOT 1.7*     Echo    CONCLUSIONS     1 - Heartmate III LVAD; speed 5200; aortic valve opens with every beat.     2 - Eccentric LVH severely depressed left ventricular systolic function (EF 20-25%).     3 - Severe left atrial enlargement.     4 - Restrictive LV filling pattern, indicating markedly elevated LAP (grade 3 diastolic dysfunction).     5 - Mild mitral regurgitation.     6 - Mild tricuspid regurgitation.     7 - Pulmonary hypertension. The estimated PA systolic pressure is 44 mmHg.     8 - Increased central venous pressure.     ASSESSMENT/PLAN:     Neuro:  - alert and responding to commands  - sedation off  - no c/o pain  - continue OOBTC every day     Resp:  - stable on 2L NC  - resp cultures were ESBL+   - wean supplemental O2 as tolerated     CV:  - HDS; LVAD numbers stable  - Dobutatmine 5, mgmt per CTS    Heme:  - Hgb/Hct 7.9/23.5  - transfused 1 unit overnight  - Continue to trend labs  - INR down to 1.3  - Heparin 500u/hr    ID:  - afebrile  - WBC 13.5  - bacteremic and resp cultures ESBL+, on ertapenem  - ID consulted  - blood cx 8/11 NGTD    Renal:  - Singer in  place  - UOP 4.5L yesterday  - Strict I/Os   - Lasix drip at 10mg/hr  - BUN/Cr trending down 91/2.0    FEN/GI:  - Currently on Protonix drip; Consider transitioning to BID  - Replace lytes PRN     Endo:  - Endocrine following  - Accuchecks  - SSI     Dispo:  - wean drips as tolerated per CTS  - plan to stepdown Monday    Vince Murguia PGY-1  [phone]  08/26/2017

## 2017-08-26 NOTE — PROGRESS NOTES
[facility]  Heart Transplant  Progress Note    Patient Name: Suman Hayden  MRN: [id number]  Admission Date: 7/18/2017  Hospital Length of Stay: 39 days  Attending Physician: Sunny Downing MD  Primary Care Provider: Joe Ernst MD  Principal Problem:LVAD (left ventricular assist device) present    Subjective:     Interval History:  Very minimal PO intake (cream of wheat x3 bites yesterday). Making good amount of urine. Not sleeping well. All questions answered.     Continuous Infusions:   DOBUTamine 5 mcg/kg/min (08/26/17 1400)    epinephrine infusion Stopped (08/15/17 1500)    furosemide (LASIX) 1 mg/mL infusion (non-titrating) 10 mg/hr (08/26/17 1400)    heparin (porcine) in 5 % dex 500 Units/hr (08/26/17 1400)    pantoprazole 40 mg in dextrose 5 % 100 mL infusion (ready to mix system) 2 mg/hr (08/26/17 1400)    TPN ADULT CENTRAL LINE CUSTOM 40 mL/hr at 08/26/17 1400    TPN ADULT CENTRAL LINE CUSTOM       Scheduled Meds:   amlodipine  2.5 mg Oral Daily    ertapenem (INVANZ) IVPB  0.5 g Intravenous Q24H    fat emulsion 20%  250 mL Intravenous Daily    potassium chloride  40 mEq Oral Daily    sodium chloride 0.9%  10 mL Intravenous Q6H    sodium chloride 0.9%  10 mL Intravenous Q6H    warfarin  5 mg Oral Once     PRN Meds:sodium chloride, sodium chloride, albumin human 5%, albuterol sulfate, bisacodyl, dextrose 50%, glucagon (human recombinant), hydrALAZINE, insulin aspart, ondansetron, Flushing PICC Protocol **AND** sodium chloride 0.9% **AND** sodium chloride 0.9%, Flushing PICC Protocol **AND** sodium chloride 0.9% **AND** sodium chloride 0.9%    Review of patient's allergies indicates:  No Known Allergies    Objective:     Vital Signs (Most Recent):  Temp: 98.8 °F (37.1 °C) (08/26/17 1100)  Pulse: 97 (08/26/17 1400)  Resp: (!) 28 (08/26/17 1400)  BP: (!) 86/0 (08/26/17 1100)  SpO2: 98 % (08/26/17 1100) Vital Signs (24h Range):  Temp:  [98.6 °F (37 °C)-99.3 °F (37.4 °C)] 98.8 °F  (37.1 °C)  Pulse:  [] 97  Resp:  [22-48] 28  SpO2:  [94 %-100 %] 98 %  BP: (86-96)/(0) 86/0  Arterial Line BP: ()/(53-77) 92/60     Weight: 86.9 kg (191 lb 9.3 oz)  Body mass index is 29.13 kg/m².      Intake/Output Summary (Last 24 hours) at 08/26/17 1542  Last data filed at 08/26/17 1300   Gross per 24 hour   Intake           2606.2 ml   Output             4665 ml   Net          -2058.8 ml     Physical Exam  Constitutional: He appears well-developed and well-nourished.   Head: Normocephalic and atraumatic.   Eyes: EOM are normal. Pupils are equal, round, and reactive to light.   Cardiovascular: Normal rate, regular rhythm and normal heart sounds. + LVAD hum    Pulmonary/Chest: Effort normal. No respiratory distress. He has no wheezes. He has no rales.   Abdominal: Soft. Bowel sounds are normal.   Musculoskeletal: He exhibits no edema.   Neurological: He is alert.   Nursing note and vitals reviewed    Significant Labs:  CBC:    Recent Labs  Lab 08/26/17  1125   WBC 11.86   RBC 2.78*   HGB 8.0*   HCT 24.2*      MCV 87   MCH 28.8   MCHC 33.1       Recent Labs  Lab 08/25/17  0407   BNP 3,601*       Recent Labs  Lab 08/26/17  1125   *   CALCIUM 8.5*   ALBUMIN 2.2*   PROT 6.6   *   K 3.9   CO2 26      BUN 92*   CREATININE 2.1*   ALKPHOS 183*   ALT 25   AST 29   BILITOT 1.8*        Recent Labs  Lab 08/26/17  0408   INR 1.5*   APTT 29.7       Recent Labs  Lab 08/24/17  0445 08/25/17  0407 08/26/17  0408   * 327* 301*     Estimated Creatinine Clearance: 36.6 mL/min (based on Cr of 2.1).        Assessment and Plan:     * LVAD (left ventricular assist device) present    - LVAD HM III. Placed this admit 7/27/17  - CTS Primary  - chest closure (7/28/17) and extubated (7/29/17), reintubated 8/9/17 and extubated 8/13/17  -  @5mcg/kg/min.  - Continue Lasix gtt (negative 2.7 L in past 24 hours)   - Speed 5200  - LDH remains stable  - AC per CTS        Electrolyte abnormality    -  Goal of K>4 and Mg>2        Upper GI bleed    - GI following peripherally.   - Continue Protonix drip for now  - Hgb stable         Bacteremia    - ESBL from blood culture 8/9   - ID following. Would defer to their judgement.   - Currently on Ertapenem        Atrial fibrillation    -AC, Amio per C TS          Atrial tachycardia    - [id number] - 3  -Rec restarting Amio. AC per CTS        AICD discharge    - Appropriate in the setting of VT aggravated by underlying AT/AFL (earlier during the admission)  - Device reprogrammed to VVI 80 this admission  - tachy therapy turned off  - EP following regarding bacteremia and leads.         Hepatitis B core antibody positive since 2012    - Liver biopsy defered as per CTS  - ID consult cleared the patient for sx          V-tach    - Continue IV amiodarone for now        Hyperlipidemia    - Please resume pravastatin once liver enzymes stabilize             Giovanni serrano, DO  Heart Transplant  [facility]

## 2017-08-26 NOTE — ASSESSMENT & PLAN NOTE
- LVAD HM III. Placed this admit 7/27/17  - CTS Primary  - chest closure (7/28/17) and extubated (7/29/17), reintubated 8/9/17 and extubated 8/13/17  -  @5mcg/kg/min.  - Continue Lasix gtt (negative 2.7 L in past 24 hours)   - Speed 5200  - LDH remains stable  - AC per CTS

## 2017-08-26 NOTE — SUBJECTIVE & OBJECTIVE
Interval History:  Very minimal PO intake (cream of wheat x3 bites yesterday). Making good amount of urine. Not sleeping well. All questions answered.     Continuous Infusions:   DOBUTamine 5 mcg/kg/min (08/26/17 1400)    epinephrine infusion Stopped (08/15/17 1500)    furosemide (LASIX) 1 mg/mL infusion (non-titrating) 10 mg/hr (08/26/17 1400)    heparin (porcine) in 5 % dex 500 Units/hr (08/26/17 1400)    pantoprazole 40 mg in dextrose 5 % 100 mL infusion (ready to mix system) 2 mg/hr (08/26/17 1400)    TPN ADULT CENTRAL LINE CUSTOM 40 mL/hr at 08/26/17 1400    TPN ADULT CENTRAL LINE CUSTOM       Scheduled Meds:   amlodipine  2.5 mg Oral Daily    ertapenem (INVANZ) IVPB  0.5 g Intravenous Q24H    fat emulsion 20%  250 mL Intravenous Daily    potassium chloride  40 mEq Oral Daily    sodium chloride 0.9%  10 mL Intravenous Q6H    sodium chloride 0.9%  10 mL Intravenous Q6H    warfarin  5 mg Oral Once     PRN Meds:sodium chloride, sodium chloride, albumin human 5%, albuterol sulfate, bisacodyl, dextrose 50%, glucagon (human recombinant), hydrALAZINE, insulin aspart, ondansetron, Flushing PICC Protocol **AND** sodium chloride 0.9% **AND** sodium chloride 0.9%, Flushing PICC Protocol **AND** sodium chloride 0.9% **AND** sodium chloride 0.9%    Review of patient's allergies indicates:  No Known Allergies    Objective:     Vital Signs (Most Recent):  Temp: 98.8 °F (37.1 °C) (08/26/17 1100)  Pulse: 97 (08/26/17 1400)  Resp: (!) 28 (08/26/17 1400)  BP: (!) 86/0 (08/26/17 1100)  SpO2: 98 % (08/26/17 1100) Vital Signs (24h Range):  Temp:  [98.6 °F (37 °C)-99.3 °F (37.4 °C)] 98.8 °F (37.1 °C)  Pulse:  [] 97  Resp:  [22-48] 28  SpO2:  [94 %-100 %] 98 %  BP: (86-96)/(0) 86/0  Arterial Line BP: ()/(53-77) 92/60     Weight: 86.9 kg (191 lb 9.3 oz)  Body mass index is 29.13 kg/m².      Intake/Output Summary (Last 24 hours) at 08/26/17 1542  Last data filed at 08/26/17 1300   Gross per 24 hour   Intake            2606.2 ml   Output             4665 ml   Net          -2058.8 ml     Physical Exam  Constitutional: He appears well-developed and well-nourished.   Head: Normocephalic and atraumatic.   Eyes: EOM are normal. Pupils are equal, round, and reactive to light.   Cardiovascular: Normal rate, regular rhythm and normal heart sounds. + LVAD hum    Pulmonary/Chest: Effort normal. No respiratory distress. He has no wheezes. He has no rales.   Abdominal: Soft. Bowel sounds are normal.   Musculoskeletal: He exhibits no edema.   Neurological: He is alert.   Nursing note and vitals reviewed    Significant Labs:  CBC:    Recent Labs  Lab 08/26/17  1125   WBC 11.86   RBC 2.78*   HGB 8.0*   HCT 24.2*      MCV 87   MCH 28.8   MCHC 33.1       Recent Labs  Lab 08/25/17  0407   BNP 3,601*       Recent Labs  Lab 08/26/17  1125   *   CALCIUM 8.5*   ALBUMIN 2.2*   PROT 6.6   *   K 3.9   CO2 26      BUN 92*   CREATININE 2.1*   ALKPHOS 183*   ALT 25   AST 29   BILITOT 1.8*        Recent Labs  Lab 08/26/17  0408   INR 1.5*   APTT 29.7       Recent Labs  Lab 08/24/17  0445 08/25/17  0407 08/26/17  0408   * 327* 301*     Estimated Creatinine Clearance: 36.6 mL/min (based on Cr of 2.1).

## 2017-08-26 NOTE — ASSESSMENT & PLAN NOTE
- Appropriate in the setting of VT aggravated by underlying AT/AFL (earlier during the admission)  - Device reprogrammed to VVI 80 this admission  - tachy therapy turned off  - EP following regarding bacteremia and leads.

## 2017-08-26 NOTE — PROGRESS NOTES
Chart reviewed.   Kidney function continues to improve.   Hypernatremia and polyuria concerning for post-ATN diuresis.   Allow patient to drink to thirst. Low threshold to start IVF (1/2NS) to match I/O to prevent worsening dehydration.   Will continue to follow.    Caprice Dangelo, PGY-5  Nephrology Fellow  [facility]  Pager: [phone]

## 2017-08-26 NOTE — PLAN OF CARE
Problem: Patient Care Overview  Goal: Plan of Care Review  Outcome: Revised  Pt continues to progress.  PRBC x1 unit administered during shift for decreased H/H, good response noted.  Excellent UOP on lasix gtt 10mg/hr.  Dobutamine gtt remains at 5 mcg/kg/min and heparin gtt remains at 500 units/hr.  LVAD HM3 speed remains 5200, no alarms appreciated.  Plan of care reviewed and discussed with spouse and patient, understanding verbalized.

## 2017-08-26 NOTE — PLAN OF CARE
Problem: Occupational Therapy Goal  Goal: Occupational Therapy Goal  Goals to be met by:  2 weeks 8/28/17    Patient will increase functional independence with ADLs by performing:  Feeding: Independent   UE Dressing with Supervision.  LE Dressing with Supervision.  Grooming while standing with Supervision.  Toileting from toilet with Supervision for hygiene and clothing management.   Stand pivot transfers with Supervision.  Toilet transfer to toilet with Supervision.  Pt will be supervision  with LVAD yasmin't.       Outcome: Ongoing (interventions implemented as appropriate)  The above goals remain appropriate. DELMY Lucero  8/26/2017

## 2017-08-26 NOTE — PROGRESS NOTES
Daily E and M and VAD Interrogation Note    Reason for Visit:  Patient is seen in follow up for management of:  [] HeartMate II  [] Heartware [] Total artificial heart       [] ECMO           [x] Other - HM III     Interval History:  [] Interval history unobtainable due to intubation.  The [x] implant/[] explant date was 7/27/17    Events -  NAEON.  Making good urine on lasix gtt.  Ate a little bit       Review of Systems:   Negative except as above.      Medications:  Current Facility-Administered Medications   Medication Dose Route Frequency Provider Last Rate Last Dose    0.9%  NaCl infusion (for blood administration)   Intravenous Q24H PRN Andres Uriarte MD        0.9%  NaCl infusion (for blood administration)   Intravenous Q24H PRN Danielle Conteh MD        albumin human 5% bottle 500 mL  500 mL Intravenous PRN Shayne Espinoza MD   500 mL at 08/09/17 0700    albuterol nebulizer solution 2.5 mg  2.5 mg Nebulization Q4H PRN Shayne Espinoza MD        amlodipine tablet 2.5 mg  2.5 mg Oral Daily Andres Uriarte MD   2.5 mg at 08/26/17 0906    bisacodyl suppository 10 mg  10 mg Rectal Daily PRN Shayne Espinoza MD   10 mg at 08/06/17 2036    dextrose 50% injection 12.5 g  12.5 g Intravenous PRN Charbel Ritter MD   12.5 g at 08/25/17 1154    DOBUtamine 1000 mg in D5W 250 mL infusion (premix non-titrating)  5 mcg/kg/min (Dosing Weight) Intravenous Continuous Sunny Downing MD 6 mL/hr at 08/26/17 1400 5 mcg/kg/min at 08/26/17 1400    EPINEPHrine (ADRENALIN) 4 mg in sodium chloride 0.9% 250 mL infusion  0.01 mcg/kg/min (Dosing Weight) Intravenous Continuous Shayne Espinoza MD   Stopped at 08/15/17 1500    ertapenem (INVANZ) 0.5 g in sodium chloride 0.9% 50 mL IVPB  0.5 g Intravenous Q24H Angie Torres  mL/hr at 08/25/17 2102 0.5 g at 08/25/17 2102    fat emulsion 20% infusion 250 mL  250 mL Intravenous Daily Andres Uriarte MD        furosemide (LASIX)  250 mg in sodium chloride 0.9 % 250 mL infusion (non-titrating)  10 mg/hr Intravenous Continuous Andres Uriarte MD 10 mL/hr at 08/26/17 1400 10 mg/hr at 08/26/17 1400    glucagon (human recombinant) injection 1 mg  1 mg Intramuscular PRN Charbel Ritter MD        heparin 25,000 units in dextrose 5% 250 mL (100 units/mL) infusion (heparin infusion)  500 Units/hr Intravenous Continuous Andres Uriarte MD 5 mL/hr at 08/26/17 1400 500 Units/hr at 08/26/17 1400    hydrALAZINE injection 10 mg  10 mg Intravenous Q6H PRN Shlomo Serrato MD   10 mg at 08/26/17 0408    insulin aspart pen 0-5 Units  0-5 Units Subcutaneous Q4H PRN Charbel Ritter MD   2 Units at 08/10/17 0751    ondansetron injection 4 mg  4 mg Intravenous Q6H PRN Shayne Espinoza MD   4 mg at 08/25/17 1449    pantoprazole 40 mg in dextrose 5 % 100 mL infusion (ready to mix system)  8 mg/hr Intravenous Continuous Sunny Downing MD 5 mL/hr at 08/26/17 1400 2 mg/hr at 08/26/17 1400    potassium chloride SA CR tablet 40 mEq  40 mEq Oral Daily Andres Uriarte MD   40 mEq at 08/26/17 0906    sodium chloride 0.9% flush 10 mL  10 mL Intravenous Q6H Sunny Downing MD   Stopped at 08/26/17 1200    And    sodium chloride 0.9% flush 10 mL  10 mL Intravenous PRN Sunny Downing MD   10 mL at 08/10/17 1151    sodium chloride 0.9% flush 10 mL  10 mL Intravenous Q6H Sunny Downing MD   Stopped at 08/26/17 1200    And    sodium chloride 0.9% flush 10 mL  10 mL Intravenous PRN Sunny Downing MD        TPN ADULT CENTRAL LINE CUSTOM   Intravenous Continuous Andres Uriarte MD 40 mL/hr at 08/26/17 1400      TPN ADULT CENTRAL LINE CUSTOM   Intravenous Continuous Andres Uriarte MD        warfarin (COUMADIN) tablet 5 mg  5 mg Oral Once Andres Uriarte MD         Physical Examination:  Vital Signs:   Vitals:    08/26/17 1400   BP:    Pulse: 97   Resp: (!) 28   Temp:      Cardiovascular:  [x] Regular rate and rhythm [] Irregular []   None (MATT) []  Other  []  No edema [x]  Edema present  [x]  Clear to auscultation  []  Rales to []  Coarse  []  No rales but   [] Pedal Pulses absent  [x]  Pulses  + throughout  Skin:  Incision is [x]  Clean, dry and intact.  []  Other   Sternum:  [x]  Stable []  Unstable  Driveline(s):   [x]  Clean, dry and intact. []  Other     Labs:  BMP  Lab Results   Component Value Date     (H) 08/26/2017    K 3.9 08/26/2017     08/26/2017    CO2 26 08/26/2017    BUN 92 (H) 08/26/2017    CREATININE 2.1 (H) 08/26/2017    CALCIUM 8.5 (L) 08/26/2017    ANIONGAP 12 08/26/2017    ESTGFRAFRICA 36.6 (A) 08/26/2017    EGFRNONAA 31.6 (A) 08/26/2017     Magnesium   Date Value Ref Range Status   08/26/2017 1.9 1.6 - 2.6 mg/dL Final     Lab Results   Component Value Date    WBC 11.86 08/26/2017    HGB 8.0 (L) 08/26/2017    HCT 24.2 (L) 08/26/2017    MCV 87 08/26/2017     08/26/2017     Lab Results   Component Value Date    INR 1.5 (H) 08/26/2017    INR 1.4 (H) 08/25/2017    INR 1.3 (H) 08/24/2017     BNP   Date Value Ref Range Status   08/25/2017 3,601 (H) 0 - 99 pg/mL Final     Comment:     Values of less than 100 pg/ml are consistent with non-CHF populations.   08/23/2017 3,515 (H) 0 - 99 pg/mL Final     Comment:     Values of less than 100 pg/ml are consistent with non-CHF populations.   08/21/2017 1,804 (H) 0 - 99 pg/mL Final     Comment:     Values of less than 100 pg/ml are consistent with non-CHF populations.     LD   Date Value Ref Range Status   08/26/2017 301 (H) 110 - 260 U/L Final     Comment:     Results are increased in hemolyzed samples.   08/25/2017 327 (H) 110 - 260 U/L Final     Comment:     Results are increased in hemolyzed samples.   08/24/2017 270 (H) 110 - 260 U/L Final     Comment:     Results are increased in hemolyzed samples.     X-Rays:  [x]  I reviewed today's Chest x-ray    Procedure:  Device Interrogation including analysis of device parameters.  Current Settings   [x]  Ventricular Assist  Device  []  Total Artificial Heart interrogated  Review of device function is [x]  Stable []  Other   TXP LVAD INTERROGATIONS 8/26/2017 8/26/2017 8/26/2017 8/26/2017 8/26/2017 8/26/2017 8/26/2017   Type HeartMate3 HeartMate3 HeartMate3 HeartMate3 HeartMate3 HeartMate3 HeartMate3   Flow 4.3 4.3 4.3 4.3 4.4 4.3 4.4   Speed 5200 5200 5200 5200 5200 5200 5200   PI 3.5 3.6 3.6 3.6 3.6 3.5 3.7   Power ([place]) 3.6 3.5 3.6 3.5 3.5 3.6 3.6   LSL 4800 4800 4800 4800 4800 4800 4800   Pulsatility - - - Intermittent pulse - - -   Some recent data might be hidden       Assessment:  [x]  Primary Cardiomyopathy [x]  Congestive Heart Failure   []  Atrial Fibrillation []  Ventricular Tachycardia   [x]  Aftercare cardiac device [x]  Long term (current) use of anticoagulants   []  Ventilator-associated pneumonia []  Pneumonia viral, unspecified   []  Pneumonia, bacterial, unspecified []  Pneumonia, organism unspecified   [x]  Hemorrhage of GI tract, unspecified    []  Nosebleed []  Driveline infection   []  Infection VAD device []  New onset of    []        Plan:  [x]  Interval history obtained from ICU attending team member during rounding today  [x]  VAD/MATT teaching performed with patient  [x]  Mobilization / Physical Therapy ongoing  [x]  Anticoagulation [x]  Ongoing []  Held  []  Studies ordered  []   To OR today for closure.       Total time spent was 30 minutes.  Of which more than 50 percent of the care dominated counseling and coordinating care with different team members. The VAD was interrogated and all parameters were WNL and no significant findings were found in the history. All these findings are documented in the note above.    Neuro:  - Alert and oriented    Resp:  - Extubated  - Resp cultures with ESBL - Ertapenem per ID   - Minimize supplemental O2  - Pulmonary toilet    CV:  - HDS; LVAD numbers stable  -   - Dobutamine at 2.5  - Dopamine at 3  - Norvasc   - Hold  given GI bleed  - LVAD numbers stable       Heme:  - Hgb/Hct stable  - 1 unit PRBC given overnight  - INR 1.5 this AM   - PICC in place  - Holding ASA 2/2 bleeding  - Coumadin - 5mg   - Heparin - PTT 40-50 today    ID:  - afebrile  - Ertapenem started for ESBL pos resp culture  - WBC 11  - PICC replaced 8/17   - Appreciate ID recs  - continue to monitor     Renal:  - Singer in place  - Strict I/Os   - Excellent UOP yesterday   - Lasix gtt restarted at 10/hr  - BUN/Cr stable  - Nephro is following.      FEN/GI:  - Clears   - Protonix gtt at 2  - Replace lytes PRN  - Continue TPN    Endo:  - Endocrine following, appreciate assistance  - Accuchecks  - Insulin ssi    Dispo:  - Continue ICU care.  - Likely stepdown Monday      Date of service:  08/26/2017     Andres Uriarte MD

## 2017-08-26 NOTE — PT/OT/SLP PROGRESS
"Occupational Therapy  Treatment    Suman Hayden   MRN: [id number]   Admitting Diagnosis: LVAD (left ventricular assist device) present    OT Date of Treatment: 08/25/17   OT Start Time: 0824  OT Stop Time: 0903  OT Total Time (min): 39 min    Billable Minutes:  Self Care/Home Management 8, Therapeutic Activity 20 and Therapeutic Exercise 10    General Precautions: Standard, aspiration, fall, LVAD, sternal, pureed diet (pureed, thin liquids)  Orthopedic Precautions:    Braces:           Subjective:  Communicated with RN prior to session.  "You scared me."    Pain/Comfort  Pain Rating 1:  (pt c/o pain with L UE ROM, but does not rate)    Objective:  Patient found with: blood pressure cuff, pulse ox (continuous), telemetry, delgado catheter, arterial line, central line, PICC line (LVAD)     Functional Mobility:  Bed Mobility:  Rolling/Turning Right: Stand by assistance  Scooting/Bridging: Moderate Assistance  Supine to Sit: Minimum Assistance    Transfers:   Sit <> Stand Assistance: Maximum Assistance  Sit <> Stand Assistive Device: No Assistive Device  Bed <> Chair Technique: Stand Pivot  Bed <> Chair Transfer Assistance: Maximum Assistance  Bed <> Chair Assistive Device: No Assistive Device    Functional Ambulation: Max A x 3 steps with UE support    Activities of Daily Living:  Feeding Level of Assistance: Minimum assistance  Feeding adaptive equipment:   UE Dressing Level of Assistance: Maximum assistance  UE adaptive equipment:   LE Dressing Level of Assistance: Total assistance  LE adaptive equipment:   Grooming Position: Seated  Grooming Level of Assistance: Stand by assistance  Toileting Where Assessed: Bed level  Toileting Level of Assistance: Total assistance     Therapeutic Activities and Exercises:  Pt participated in UE/LE AROM ex's x 15 reps in all planes. Pt sat EOB with SBA during self-care tasks and donning of controller pouch    AM-PAC 6 CLICK ADL   How much help from another person does this patient " "currently need?   1 = Unable, Total/Dependent Assistance  2 = A lot, Maximum/Moderate Assistance  3 = A little, Minimum/Contact Guard/Supervision  4 = None, Modified [place]/Independent    Putting on and taking off regular lower body clothing? : 1  Bathing (including washing, rinsing, drying)?: 2  Toileting, which includes using toilet, bedpan, or urinal? : 1  Putting on and taking off regular upper body clothing?: 2  Taking care of personal grooming such as brushing teeth?: 3  Eating meals?: 3  Total Score: 12     AM-PAC Raw Score CMS "G-Code Modifier Level of Impairment Assistance   6 % Total / Unable   7 - 8 CM 80 - 100% Maximal Assist   9-13 CL 60 - 80% Moderate Assist   14 - 19 CK 40 - 60% Moderate Assist   20 - 22 CJ 20 - 40% Minimal Assist   23 CI 1-20% SBA / CGA   24 CH 0% Independent/ Mod I       Patient left up in chair with all lines intact, call button in reach, RN notified and spouse present    ASSESSMENT:  Suman Hayden is a 67 y.o. male with a medical diagnosis of LVAD (left ventricular assist device) present and presents with weakness and decreased endurance affecting ADL (I). Pt demonstrated improvement in bed mobility this session    Rehab identified problem list/impairments: Rehab identified problem list/impairments: weakness, impaired endurance, impaired self care skills, impaired balance, gait instability, impaired functional mobilty, pain, edema, decreased upper extremity function, impaired cardiopulmonary response to activity, impaired coordination    Rehab potential is good.    Activity tolerance: Good    Discharge recommendations: Discharge Facility/Level Of Care Needs: rehabilitation facility     Barriers to discharge: Barriers to Discharge: Decreased caregiver support (at current functional level)    Equipment recommendations:  (TBD closer to d/c)     GOALS:    Occupational Therapy Goals        Problem: Occupational Therapy Goal    Goal Priority Disciplines Outcome Interventions "   Occupational Therapy Goal     OT, PT/OT Ongoing (interventions implemented as appropriate)    Description:  Goals to be met by:  2 weeks 8/28/17    Patient will increase functional independence with ADLs by performing:  Feeding: Independent   UE Dressing with Supervision.  LE Dressing with Supervision.  Grooming while standing with Supervision.  Toileting from toilet with Supervision for hygiene and clothing management.   Stand pivot transfers with Supervision.  Toilet transfer to toilet with Supervision.  Pt will be supervision  with LVAD yasmin't.                  Multidisciplinary Problems (Resolved)        Problem: Occupational Therapy Goal    Goal Priority Disciplines Outcome Interventions   Occupational Therapy Goal   (Resolved)     OT, PT/OT  Error    Description:  Goals to be met by: 8/04/2017    Patient will increase functional independence with ADLs by performing:    Increased functional strength to 5/5 for improved ADL performance.  Upper extremity exercise program and R LE ankle pumps  3x 10 reps per handout, with independence.                      Plan:  Patient to be seen 6 x/week to address the above listed problems via self-care/home management, therapeutic activities, therapeutic exercises  Plan of Care expires: 08/29/17  Plan of Care reviewed with: patient, spouse         Sammi DELMY Malloy  08/26/2017

## 2017-08-26 NOTE — ASSESSMENT & PLAN NOTE
- ESBL from blood culture 8/9   - ID following. Would defer to their judgement.   - Currently on Ertapenem

## 2017-08-27 PROBLEM — E87.0 HYPERNATREMIA: Status: ACTIVE | Noted: 2017-08-27

## 2017-08-27 LAB
ALBUMIN SERPL BCP-MCNC: 2 G/DL
ALBUMIN SERPL BCP-MCNC: 2 G/DL
ALBUMIN SERPL BCP-MCNC: 2.1 G/DL
ALP SERPL-CCNC: 159 U/L
ALP SERPL-CCNC: 168 U/L
ALP SERPL-CCNC: 172 U/L
ALP SERPL-CCNC: 172 U/L
ALP SERPL-CCNC: 173 U/L
ALT SERPL W/O P-5'-P-CCNC: 22 U/L
ALT SERPL W/O P-5'-P-CCNC: 23 U/L
ALT SERPL W/O P-5'-P-CCNC: 24 U/L
ANION GAP SERPL CALC-SCNC: 10 MMOL/L
ANION GAP SERPL CALC-SCNC: 11 MMOL/L
ANION GAP SERPL CALC-SCNC: 12 MMOL/L
ANION GAP SERPL CALC-SCNC: 9 MMOL/L
APTT BLDCRRT: 31.8 SEC
AST SERPL-CCNC: 27 U/L
AST SERPL-CCNC: 28 U/L
AST SERPL-CCNC: 32 U/L
BASOPHILS # BLD AUTO: 0.04 K/UL
BASOPHILS # BLD AUTO: 0.05 K/UL
BASOPHILS # BLD AUTO: 0.05 K/UL
BASOPHILS # BLD AUTO: 0.06 K/UL
BASOPHILS # BLD AUTO: 0.06 K/UL
BASOPHILS NFR BLD: 0.3 %
BASOPHILS NFR BLD: 0.4 %
BILIRUB SERPL-MCNC: 1.6 MG/DL
BILIRUB SERPL-MCNC: 1.6 MG/DL
BILIRUB SERPL-MCNC: 1.7 MG/DL
BILIRUB SERPL-MCNC: 1.7 MG/DL
BILIRUB SERPL-MCNC: 1.8 MG/DL
BUN SERPL-MCNC: 81 MG/DL
BUN SERPL-MCNC: 83 MG/DL
BUN SERPL-MCNC: 85 MG/DL
BUN SERPL-MCNC: 87 MG/DL
BUN SERPL-MCNC: 87 MG/DL
BUN SERPL-MCNC: 88 MG/DL
CALCIUM SERPL-MCNC: 8.4 MG/DL
CALCIUM SERPL-MCNC: 8.4 MG/DL
CALCIUM SERPL-MCNC: 8.5 MG/DL
CALCIUM SERPL-MCNC: 8.6 MG/DL
CHLORIDE SERPL-SCNC: 107 MMOL/L
CHLORIDE SERPL-SCNC: 107 MMOL/L
CHLORIDE SERPL-SCNC: 108 MMOL/L
CHLORIDE SERPL-SCNC: 109 MMOL/L
CO2 SERPL-SCNC: 28 MMOL/L
CO2 SERPL-SCNC: 29 MMOL/L
CO2 SERPL-SCNC: 29 MMOL/L
CO2 SERPL-SCNC: 30 MMOL/L
CO2 SERPL-SCNC: 31 MMOL/L
CO2 SERPL-SCNC: 32 MMOL/L
CREAT SERPL-MCNC: 1.9 MG/DL
CREAT SERPL-MCNC: 2 MG/DL
DIFFERENTIAL METHOD: ABNORMAL
EOSINOPHIL # BLD AUTO: 0.4 K/UL
EOSINOPHIL NFR BLD: 2.4 %
EOSINOPHIL NFR BLD: 2.7 %
EOSINOPHIL NFR BLD: 2.8 %
EOSINOPHIL NFR BLD: 2.8 %
EOSINOPHIL NFR BLD: 3.1 %
ERYTHROCYTE [DISTWIDTH] IN BLOOD BY AUTOMATED COUNT: 16.9 %
ERYTHROCYTE [DISTWIDTH] IN BLOOD BY AUTOMATED COUNT: 17.1 %
ERYTHROCYTE [DISTWIDTH] IN BLOOD BY AUTOMATED COUNT: 17.3 %
EST. GFR  (AFRICAN AMERICAN): 38.8 ML/MIN/1.73 M^2
EST. GFR  (AFRICAN AMERICAN): 41.3 ML/MIN/1.73 M^2
EST. GFR  (NON AFRICAN AMERICAN): 33.5 ML/MIN/1.73 M^2
EST. GFR  (NON AFRICAN AMERICAN): 35.7 ML/MIN/1.73 M^2
GLUCOSE SERPL-MCNC: 104 MG/DL
GLUCOSE SERPL-MCNC: 111 MG/DL
GLUCOSE SERPL-MCNC: 116 MG/DL
GLUCOSE SERPL-MCNC: 92 MG/DL
GLUCOSE SERPL-MCNC: 96 MG/DL
GLUCOSE SERPL-MCNC: 96 MG/DL
HCT VFR BLD AUTO: 24.2 %
HCT VFR BLD AUTO: 24.2 %
HCT VFR BLD AUTO: 24.9 %
HCT VFR BLD AUTO: 26.1 %
HCT VFR BLD AUTO: 26.4 %
HGB BLD-MCNC: 7.9 G/DL
HGB BLD-MCNC: 7.9 G/DL
HGB BLD-MCNC: 8.1 G/DL
HGB BLD-MCNC: 8.4 G/DL
HGB BLD-MCNC: 8.4 G/DL
INR PPP: 1.7
LDH SERPL L TO P-CCNC: 373 U/L
LYMPHOCYTES # BLD AUTO: 1.4 K/UL
LYMPHOCYTES # BLD AUTO: 1.7 K/UL
LYMPHOCYTES NFR BLD: 11.9 %
LYMPHOCYTES NFR BLD: 11.9 %
LYMPHOCYTES NFR BLD: 13.6 %
LYMPHOCYTES NFR BLD: 13.6 %
LYMPHOCYTES NFR BLD: 9.6 %
MAGNESIUM SERPL-MCNC: 2 MG/DL
MCH RBC QN AUTO: 28.4 PG
MCH RBC QN AUTO: 28.6 PG
MCH RBC QN AUTO: 28.7 PG
MCH RBC QN AUTO: 28.7 PG
MCH RBC QN AUTO: 28.8 PG
MCHC RBC AUTO-ENTMCNC: 31.8 G/DL
MCHC RBC AUTO-ENTMCNC: 32.2 G/DL
MCHC RBC AUTO-ENTMCNC: 32.5 G/DL
MCHC RBC AUTO-ENTMCNC: 32.6 G/DL
MCHC RBC AUTO-ENTMCNC: 32.6 G/DL
MCV RBC AUTO: 87 FL
MCV RBC AUTO: 88 FL
MCV RBC AUTO: 88 FL
MCV RBC AUTO: 89 FL
MCV RBC AUTO: 90 FL
MONOCYTES # BLD AUTO: 0.9 K/UL
MONOCYTES # BLD AUTO: 1 K/UL
MONOCYTES # BLD AUTO: 1 K/UL
MONOCYTES # BLD AUTO: 1.1 K/UL
MONOCYTES # BLD AUTO: 1.1 K/UL
MONOCYTES NFR BLD: 6 %
MONOCYTES NFR BLD: 7.4 %
MONOCYTES NFR BLD: 7.7 %
MONOCYTES NFR BLD: 8 %
MONOCYTES NFR BLD: 8 %
NEUTROPHILS # BLD AUTO: 10.9 K/UL
NEUTROPHILS # BLD AUTO: 11.4 K/UL
NEUTROPHILS # BLD AUTO: 11.7 K/UL
NEUTROPHILS # BLD AUTO: 9.5 K/UL
NEUTROPHILS # BLD AUTO: 9.5 K/UL
NEUTROPHILS NFR BLD: 74.7 %
NEUTROPHILS NFR BLD: 74.7 %
NEUTROPHILS NFR BLD: 76.7 %
NEUTROPHILS NFR BLD: 79.4 %
NEUTROPHILS NFR BLD: 79.6 %
PHOSPHATE SERPL-MCNC: 3.8 MG/DL
PLATELET # BLD AUTO: 271 K/UL
PLATELET # BLD AUTO: 273 K/UL
PLATELET # BLD AUTO: 276 K/UL
PLATELET # BLD AUTO: 276 K/UL
PLATELET # BLD AUTO: 277 K/UL
PMV BLD AUTO: 11.9 FL
PMV BLD AUTO: 12 FL
PMV BLD AUTO: 12.1 FL
POCT GLUCOSE: 105 MG/DL (ref 70–110)
POCT GLUCOSE: 106 MG/DL (ref 70–110)
POCT GLUCOSE: 106 MG/DL (ref 70–110)
POCT GLUCOSE: 107 MG/DL (ref 70–110)
POCT GLUCOSE: 109 MG/DL (ref 70–110)
POCT GLUCOSE: 111 MG/DL (ref 70–110)
POCT GLUCOSE: 114 MG/DL (ref 70–110)
POCT GLUCOSE: 99 MG/DL (ref 70–110)
POTASSIUM SERPL-SCNC: 3.8 MMOL/L
POTASSIUM SERPL-SCNC: 4 MMOL/L
POTASSIUM SERPL-SCNC: 4.2 MMOL/L
PROT SERPL-MCNC: 6.5 G/DL
PROT SERPL-MCNC: 6.5 G/DL
PROT SERPL-MCNC: 6.7 G/DL
PROT SERPL-MCNC: 6.8 G/DL
PROT SERPL-MCNC: 6.8 G/DL
PROTHROMBIN TIME: 17.6 SEC
RBC # BLD AUTO: 2.75 M/UL
RBC # BLD AUTO: 2.75 M/UL
RBC # BLD AUTO: 2.85 M/UL
RBC # BLD AUTO: 2.92 M/UL
RBC # BLD AUTO: 2.94 M/UL
SODIUM SERPL-SCNC: 147 MMOL/L
SODIUM SERPL-SCNC: 148 MMOL/L
SODIUM SERPL-SCNC: 149 MMOL/L
SODIUM SERPL-SCNC: 150 MMOL/L
WBC # BLD AUTO: 12.69 K/UL
WBC # BLD AUTO: 12.69 K/UL
WBC # BLD AUTO: 14.24 K/UL
WBC # BLD AUTO: 14.43 K/UL
WBC # BLD AUTO: 14.71 K/UL

## 2017-08-27 PROCEDURE — 85025 COMPLETE CBC W/AUTO DIFF WBC: CPT

## 2017-08-27 PROCEDURE — 85610 PROTHROMBIN TIME: CPT

## 2017-08-27 PROCEDURE — 97530 THERAPEUTIC ACTIVITIES: CPT

## 2017-08-27 PROCEDURE — 63600175 PHARM REV CODE 636 W HCPCS: Performed by: THORACIC SURGERY (CARDIOTHORACIC VASCULAR SURGERY)

## 2017-08-27 PROCEDURE — 25000003 PHARM REV CODE 250: Performed by: INTERNAL MEDICINE

## 2017-08-27 PROCEDURE — 27000248 HC VAD-ADDITIONAL DAY

## 2017-08-27 PROCEDURE — 99233 SBSQ HOSP IP/OBS HIGH 50: CPT | Mod: ,,, | Performed by: INTERNAL MEDICINE

## 2017-08-27 PROCEDURE — 83735 ASSAY OF MAGNESIUM: CPT

## 2017-08-27 PROCEDURE — 85730 THROMBOPLASTIN TIME PARTIAL: CPT

## 2017-08-27 PROCEDURE — A4216 STERILE WATER/SALINE, 10 ML: HCPCS | Performed by: THORACIC SURGERY (CARDIOTHORACIC VASCULAR SURGERY)

## 2017-08-27 PROCEDURE — 99232 SBSQ HOSP IP/OBS MODERATE 35: CPT | Mod: GC,,, | Performed by: ANESTHESIOLOGY

## 2017-08-27 PROCEDURE — 80053 COMPREHEN METABOLIC PANEL: CPT | Mod: 91

## 2017-08-27 PROCEDURE — 97110 THERAPEUTIC EXERCISES: CPT

## 2017-08-27 PROCEDURE — 25000003 PHARM REV CODE 250: Performed by: STUDENT IN AN ORGANIZED HEALTH CARE EDUCATION/TRAINING PROGRAM

## 2017-08-27 PROCEDURE — 84100 ASSAY OF PHOSPHORUS: CPT

## 2017-08-27 PROCEDURE — B4185 PARENTERAL SOL 10 GM LIPIDS: HCPCS | Performed by: STUDENT IN AN ORGANIZED HEALTH CARE EDUCATION/TRAINING PROGRAM

## 2017-08-27 PROCEDURE — 20000000 HC ICU ROOM

## 2017-08-27 PROCEDURE — C9113 INJ PANTOPRAZOLE SODIUM, VIA: HCPCS | Performed by: THORACIC SURGERY (CARDIOTHORACIC VASCULAR SURGERY)

## 2017-08-27 PROCEDURE — 63600175 PHARM REV CODE 636 W HCPCS: Performed by: INTERNAL MEDICINE

## 2017-08-27 PROCEDURE — 25000003 PHARM REV CODE 250: Performed by: THORACIC SURGERY (CARDIOTHORACIC VASCULAR SURGERY)

## 2017-08-27 PROCEDURE — 83615 LACTATE (LD) (LDH) ENZYME: CPT

## 2017-08-27 PROCEDURE — 63600175 PHARM REV CODE 636 W HCPCS: Performed by: STUDENT IN AN ORGANIZED HEALTH CARE EDUCATION/TRAINING PROGRAM

## 2017-08-27 PROCEDURE — 63600175 PHARM REV CODE 636 W HCPCS: Performed by: GENERAL PRACTICE

## 2017-08-27 RX ORDER — WARFARIN SODIUM 5 MG/1
5 TABLET ORAL ONCE
Status: COMPLETED | OUTPATIENT
Start: 2017-08-27 | End: 2017-08-27

## 2017-08-27 RX ORDER — POTASSIUM CHLORIDE 14.9 MG/ML
20 INJECTION INTRAVENOUS ONCE
Status: COMPLETED | OUTPATIENT
Start: 2017-08-27 | End: 2017-08-27

## 2017-08-27 RX ADMIN — Medication 10 ML: at 12:08

## 2017-08-27 RX ADMIN — Medication 10 ML: at 06:08

## 2017-08-27 RX ADMIN — CALCIUM GLUCONATE: 94 INJECTION, SOLUTION INTRAVENOUS at 09:08

## 2017-08-27 RX ADMIN — SOYBEAN OIL 250 ML: 20 INJECTION, SOLUTION INTRAVENOUS at 09:08

## 2017-08-27 RX ADMIN — PANTOPRAZOLE SODIUM 2 MG/HR: 40 INJECTION, POWDER, FOR SOLUTION INTRAVENOUS at 05:08

## 2017-08-27 RX ADMIN — HYDRALAZINE HYDROCHLORIDE 10 MG: 20 INJECTION INTRAMUSCULAR; INTRAVENOUS at 02:08

## 2017-08-27 RX ADMIN — POTASSIUM CHLORIDE 20 MEQ: 200 INJECTION, SOLUTION INTRAVENOUS at 12:08

## 2017-08-27 RX ADMIN — POTASSIUM CHLORIDE 20 MEQ: 200 INJECTION, SOLUTION INTRAVENOUS at 06:08

## 2017-08-27 RX ADMIN — WARFARIN SODIUM 5 MG: 5 TABLET ORAL at 05:08

## 2017-08-27 RX ADMIN — SODIUM CHLORIDE 0.5 G: 9 INJECTION, SOLUTION INTRAVENOUS at 08:08

## 2017-08-27 RX ADMIN — Medication 10 ML: at 11:08

## 2017-08-27 RX ADMIN — POTASSIUM CHLORIDE 40 MEQ: 1500 TABLET, EXTENDED RELEASE ORAL at 08:08

## 2017-08-27 RX ADMIN — FUROSEMIDE 10 MG/HR: 10 INJECTION, SOLUTION INTRAMUSCULAR; INTRAVENOUS at 05:08

## 2017-08-27 RX ADMIN — AMLODIPINE BESYLATE 2.5 MG: 2.5 TABLET ORAL at 08:08

## 2017-08-27 RX ADMIN — FUROSEMIDE 10 MG/HR: 10 INJECTION, SOLUTION INTRAMUSCULAR; INTRAVENOUS at 09:08

## 2017-08-27 NOTE — ASSESSMENT & PLAN NOTE
- Appropriate in the setting of VT aggravated by underlying AT/AFL (earlier during the admission). Device reprogrammed to VVI 80 this admission  - EP following.

## 2017-08-27 NOTE — ASSESSMENT & PLAN NOTE
- 2/2 dehydration  - iatrogenic from lasix-induced polyuria  - Na continues to rise; concerned that dehydration will soon result in a pre-renal INDIRA  - recommend decreasing lasix for UOP < 3L per day  - current free water deficit is 2.8L; not including ongoing losses from polyuria  - recommend starting D5W at 100cc/hr to correct deficit

## 2017-08-27 NOTE — PLAN OF CARE
Problem: Patient Care Overview  Goal: Plan of Care Review  Outcome: Ongoing (interventions implemented as appropriate)  Plan of care reviewed with patient and patient's spouse.  Remained on room air throughout the day with 02 sats >95%.  OOB to chair this morning x7 hours, tolerated well.  Lasix, heparin, protonix, TPN and dobutmine gtts continued.  UO remains adequate.  LVAD functioning properly with no alarms, dressing changed per order.  No acute events this shift, transfer orders anticipated for tomorrow.  VSS throughout the day, see flowsheet for full assessment.

## 2017-08-27 NOTE — SUBJECTIVE & OBJECTIVE
Interval History:  No acute issues overnight. Complicated course. Still requiring TPN. All questions answered.    Continuous Infusions:   DOBUTamine 5 mcg/kg/min (08/27/17 1300)    epinephrine infusion Stopped (08/15/17 1500)    furosemide (LASIX) 1 mg/mL infusion (non-titrating) 10 mg/hr (08/27/17 1300)    heparin (porcine) in 5 % dex 500 Units/hr (08/27/17 1300)    pantoprazole 40 mg in dextrose 5 % 100 mL infusion (ready to mix system) 2 mg/hr (08/27/17 1300)    TPN ADULT CENTRAL LINE CUSTOM 40 mL/hr at 08/27/17 1300    TPN ADULT CENTRAL LINE CUSTOM       Scheduled Meds:   amlodipine  2.5 mg Oral Daily    ertapenem (INVANZ) IVPB  0.5 g Intravenous Q24H    fat emulsion 20%  250 mL Intravenous Daily    potassium chloride  40 mEq Oral Daily    sodium chloride 0.9%  10 mL Intravenous Q6H    sodium chloride 0.9%  10 mL Intravenous Q6H    warfarin  5 mg Oral Once     PRN Meds:sodium chloride, sodium chloride, albumin human 5%, albuterol sulfate, bisacodyl, dextrose 50%, glucagon (human recombinant), hydrALAZINE, insulin aspart, ondansetron, Flushing PICC Protocol **AND** sodium chloride 0.9% **AND** sodium chloride 0.9%, Flushing PICC Protocol **AND** sodium chloride 0.9% **AND** sodium chloride 0.9%    Review of patient's allergies indicates:  No Known Allergies    Objective:     Vital Signs (Most Recent):  Temp: 98.7 °F (37.1 °C) (08/27/17 1100)  Pulse: 80 (08/27/17 1315)  Resp: (!) 34 (08/27/17 1315)  BP: (!) 88/0 (08/27/17 1100)  SpO2: 100 % (08/27/17 1115) Vital Signs (24h Range):  Temp:  [98.7 °F (37.1 °C)-99.3 °F (37.4 °C)] 98.7 °F (37.1 °C)  Pulse:  [] 80  Resp:  [21-45] 34  SpO2:  [96 %-100 %] 100 %  BP: (76-96)/(0) 88/0  Arterial Line BP: ()/(53-85) 101/62     Weight: 86.6 kg (190 lb 14.7 oz)  Body mass index is 29.03 kg/m².      Intake/Output Summary (Last 24 hours) at 08/27/17 1327  Last data filed at 08/27/17 1300   Gross per 24 hour   Intake             2583 ml   Output              5685 ml   Net            -3102 ml     Physical Exam  Constitutional: He appears well-developed and well-nourished.   Head: Normocephalic and atraumatic.   Eyes: EOM are normal. Pupils are equal, round, and reactive to light.   Cardiovascular: Normal rate, regular rhythm and normal heart sounds. + LVAD hum    Pulmonary/Chest: Effort normal. No respiratory distress. He has no wheezes. He has no rales.   Abdominal: Soft. Bowel sounds are normal.   Musculoskeletal: Trace edema.   Neurological: He is alert.   Nursing note and vitals reviewed    Significant Labs:  CBC:    Recent Labs  Lab 08/27/17  1117   WBC 14.43*   RBC 2.94*   HGB 8.4*   HCT 26.4*      MCV 90   MCH 28.6   MCHC 31.8*       Recent Labs  Lab 08/25/17  0407   BNP 3,601*       Recent Labs  Lab 08/27/17  1117   *   CALCIUM 8.6*   ALBUMIN 2.1*   PROT 6.8   *   K 4.0   CO2 31*      BUN 85*   CREATININE 2.0*   ALKPHOS 173*   ALT 23   AST 28   BILITOT 1.6*        Recent Labs  Lab 08/27/17  0404   INR 1.7*   APTT 31.8       Recent Labs  Lab 08/25/17  0407 08/26/17  0408 08/27/17  0404   * 301* 373*     Estimated Creatinine Clearance: 38.4 mL/min (based on Cr of 2).

## 2017-08-27 NOTE — ASSESSMENT & PLAN NOTE
- LVAD HM III. Placed this admit 7/27/17  - CTS Primary  - chest closure (7/28/17) and extubated (7/29/17), reintubated 8/9/17 and extubated 8/13/17  -  @5mcg/kg/min.  - Continue Lasix gtt at 10  - negative 2.8 L in past 24 hours and -8.3 since admission.

## 2017-08-27 NOTE — PROGRESS NOTES
Progress Note  Surgical Intensive Care    Admit Date: 7/18/2017  Post-operative Day: 10 Days Post-Op  Hospital Day: 41    SUBJECTIVE:     Follow-up For:  Procedure(s) (LRB):  ESOPHAGOGASTRODUODENOSCOPY (EGD) (N/A)   S/p sternotomy closure 7/28/17    Interval history: No acute events overnight. Hydralazine for HTN. Dobutatmine 5. Lasix gtt at 10.    Scheduled Meds:   amlodipine  2.5 mg Oral Daily    ertapenem (INVANZ) IVPB  0.5 g Intravenous Q24H    fat emulsion 20%  250 mL Intravenous Daily    potassium chloride  40 mEq Oral Daily    sodium chloride 0.9%  10 mL Intravenous Q6H    sodium chloride 0.9%  10 mL Intravenous Q6H     Continuous Infusions:   DOBUTamine 5 mcg/kg/min (08/27/17 0700)    epinephrine infusion Stopped (08/15/17 1500)    furosemide (LASIX) 1 mg/mL infusion (non-titrating) 10 mg/hr (08/27/17 0700)    heparin (porcine) in 5 % dex 500 Units/hr (08/27/17 0700)    pantoprazole 40 mg in dextrose 5 % 100 mL infusion (ready to mix system) 2 mg/hr (08/27/17 0700)    TPN ADULT CENTRAL LINE CUSTOM 40 mL/hr at 08/27/17 0700     PRN Meds:.sodium chloride, sodium chloride, albumin human 5%, albuterol sulfate, bisacodyl, dextrose 50%, glucagon (human recombinant), hydrALAZINE, insulin aspart, ondansetron, Flushing PICC Protocol **AND** sodium chloride 0.9% **AND** sodium chloride 0.9%, Flushing PICC Protocol **AND** sodium chloride 0.9% **AND** sodium chloride 0.9%  Review of patient's allergies indicates:  No Known Allergies    OBJECTIVE:     Vital Signs (Most Recent)  Temp: 99 °F (37.2 °C) (08/27/17 0700)  Pulse: 80 (08/27/17 0700)  Resp: (!) 31 (08/27/17 0700)  BP: (!) 90/0 (08/27/17 0700)  SpO2: 97 % (08/27/17 0700)    Vital Signs Range (Last 24H):  Temp:  [98.7 °F (37.1 °C)-99.3 °F (37.4 °C)]   Pulse:  []   Resp:  [21-43]   BP: (76-96)/(0)   SpO2:  [94 %-100 %]   Arterial Line BP: ()/(53-85)     I & O (Last 24H):    Intake/Output Summary (Last 24 hours) at 08/27/17 0724  Last  data filed at 08/27/17 0700   Gross per 24 hour   Intake             2664 ml   Output             5480 ml   Net            -2816 ml        Physical Exam    Constitutional: He appears well-developed and well-nourished. No distress. He is alert  HENT:   Head: Normocephalic and atraumatic.    Neck: Normal range of motion. Neck supple.   Cardiovascular: Intact distal pulses.    LVAD hum present.   Pulmonary/Chest: Effort normal. No respiratory distress..   Abdominal: Soft. He exhibits no distension.   Skin: Skin is warm and dry.     Laboratory (Last 24H):  CBC:    Recent Labs  Lab 08/27/17  0404   WBC 12.69  12.69   HGB 7.9*  7.9*   HCT 24.2*  24.2*     276     CMP:    Recent Labs  Lab 08/27/17  0404   CALCIUM 8.4*  8.4*   ALBUMIN 2.0*   PROT 6.5   *  149*   K 3.8  3.8   CO2 29  29     108   BUN 87*  87*   CREATININE 2.0*  2.0*   ALKPHOS 172*   ALT 24   AST 27   BILITOT 1.6*     Echo    CONCLUSIONS     1 - Heartmate III LVAD; speed 5200; aortic valve opens with every beat.     2 - Eccentric LVH severely depressed left ventricular systolic function (EF 20-25%).     3 - Severe left atrial enlargement.     4 - Restrictive LV filling pattern, indicating markedly elevated LAP (grade 3 diastolic dysfunction).     5 - Mild mitral regurgitation.     6 - Mild tricuspid regurgitation.     7 - Pulmonary hypertension. The estimated PA systolic pressure is 44 mmHg.     8 - Increased central venous pressure.     ASSESSMENT/PLAN:     Neuro:  - alert and responding to commands  - sedation off  - no c/o pain  - continue OOBTC every day     Resp:  - stable on 2L NC  - resp cultures were ESBL+   - wean supplemental O2 as tolerated     CV:  - HDS; LVAD numbers stable  - Dobutatmine 5, mgmt per CTS    Heme:  - Hgb/Hct 7.9/23.5  - transfused 1 unit overnight  - Continue to trend labs  - INR down to 1.3  - Heparin 500u/hr    ID:  - afebrile  - WBC wnl  - bacteremic and resp cultures ESBL+, on ertapenem  - ID  consulted  - blood cx 8/11 NGTD    Renal:  - Singer in place  - UOP 4.5L yesterday  - Strict I/Os   - Lasix drip at 10mg/hr  - BUN/Cr wnl    FEN/GI:  - Currently on Protonix drip; Consider transitioning to BID  - Replace lytes PRN     Endo:  - Endocrine following  - Accuchecks  - SSI     Dispo:  - wean drips as tolerated per CTS  - plan to stepdown Monday    Vince Murguia PGY-1  [phone]  08/27/2017

## 2017-08-27 NOTE — PROGRESS NOTES
[facility]  Heart Transplant  Progress Note    Patient Name: Suman Hayden  MRN: [id number]  Admission Date: 7/18/2017  Hospital Length of Stay: 40 days  Attending Physician: Sunny Downing MD  Primary Care Provider: Joe Ernst MD  Principal Problem:LVAD (left ventricular assist device) present    Subjective:     Interval History:  No acute issues overnight. Complicated course. Still requiring TPN. All questions answered.    Continuous Infusions:   DOBUTamine 5 mcg/kg/min (08/27/17 1300)    epinephrine infusion Stopped (08/15/17 1500)    furosemide (LASIX) 1 mg/mL infusion (non-titrating) 10 mg/hr (08/27/17 1300)    heparin (porcine) in 5 % dex 500 Units/hr (08/27/17 1300)    pantoprazole 40 mg in dextrose 5 % 100 mL infusion (ready to mix system) 2 mg/hr (08/27/17 1300)    TPN ADULT CENTRAL LINE CUSTOM 40 mL/hr at 08/27/17 1300    TPN ADULT CENTRAL LINE CUSTOM       Scheduled Meds:   amlodipine  2.5 mg Oral Daily    ertapenem (INVANZ) IVPB  0.5 g Intravenous Q24H    fat emulsion 20%  250 mL Intravenous Daily    potassium chloride  40 mEq Oral Daily    sodium chloride 0.9%  10 mL Intravenous Q6H    sodium chloride 0.9%  10 mL Intravenous Q6H    warfarin  5 mg Oral Once     PRN Meds:sodium chloride, sodium chloride, albumin human 5%, albuterol sulfate, bisacodyl, dextrose 50%, glucagon (human recombinant), hydrALAZINE, insulin aspart, ondansetron, Flushing PICC Protocol **AND** sodium chloride 0.9% **AND** sodium chloride 0.9%, Flushing PICC Protocol **AND** sodium chloride 0.9% **AND** sodium chloride 0.9%    Review of patient's allergies indicates:  No Known Allergies    Objective:     Vital Signs (Most Recent):  Temp: 98.7 °F (37.1 °C) (08/27/17 1100)  Pulse: 80 (08/27/17 1315)  Resp: (!) 34 (08/27/17 1315)  BP: (!) 88/0 (08/27/17 1100)  SpO2: 100 % (08/27/17 1115) Vital Signs (24h Range):  Temp:  [98.7 °F (37.1 °C)-99.3 °F (37.4 °C)] 98.7 °F (37.1 °C)  Pulse:  [] 80  Resp:   [21-45] 34  SpO2:  [96 %-100 %] 100 %  BP: (76-96)/(0) 88/0  Arterial Line BP: ()/(53-85) 101/62     Weight: 86.6 kg (190 lb 14.7 oz)  Body mass index is 29.03 kg/m².      Intake/Output Summary (Last 24 hours) at 08/27/17 1327  Last data filed at 08/27/17 1300   Gross per 24 hour   Intake             2583 ml   Output             5685 ml   Net            -3102 ml     Physical Exam  Constitutional: He appears well-developed and well-nourished.   Head: Normocephalic and atraumatic.   Eyes: EOM are normal. Pupils are equal, round, and reactive to light.   Cardiovascular: Normal rate, regular rhythm and normal heart sounds. + LVAD hum    Pulmonary/Chest: Effort normal. No respiratory distress. He has no wheezes. He has no rales.   Abdominal: Soft. Bowel sounds are normal.   Musculoskeletal: Trace edema.   Neurological: He is alert.   Nursing note and vitals reviewed    Significant Labs:  CBC:    Recent Labs  Lab 08/27/17  1117   WBC 14.43*   RBC 2.94*   HGB 8.4*   HCT 26.4*      MCV 90   MCH 28.6   MCHC 31.8*       Recent Labs  Lab 08/25/17  0407   BNP 3,601*       Recent Labs  Lab 08/27/17  1117   *   CALCIUM 8.6*   ALBUMIN 2.1*   PROT 6.8   *   K 4.0   CO2 31*      BUN 85*   CREATININE 2.0*   ALKPHOS 173*   ALT 23   AST 28   BILITOT 1.6*        Recent Labs  Lab 08/27/17  0404   INR 1.7*   APTT 31.8       Recent Labs  Lab 08/25/17  0407 08/26/17  0408 08/27/17  0404   * 301* 373*     Estimated Creatinine Clearance: 38.4 mL/min (based on Cr of 2).        Assessment and Plan:     * LVAD (left ventricular assist device) present    - LVAD HM III. Placed this admit 7/27/17  - CTS Primary  - chest closure (7/28/17) and extubated (7/29/17), reintubated 8/9/17 and extubated 8/13/17  -  @5mcg/kg/min.  - Continue Lasix gtt at 10  - negative 2.8 L in past 24 hours and -8.3 since admission.         Electrolyte abnormality    - Goal of K>4 and Mg>2        Upper GI bleed    - GI following  peripherally. Hgb stable.   - Continue Protonix gtt        Bacteremia    - ESBL bacteremia. Currently on ertapenem.   - ID following.         Atrial fibrillation    -AC with coumadin   - INR 1.7        AICD discharge    - Appropriate in the setting of VT aggravated by underlying AT/AFL (earlier during the admission). Device reprogrammed to VVI 80 this admission  - EP following.        Patient discussed with Dr. Brian serrano, DO  Heart Transplant  [facility]

## 2017-08-27 NOTE — PT/OT/SLP PROGRESS
Physical Therapy  Treatment    Suman Hayden   MRN: [id number]   Admitting Diagnosis: LVAD (left ventricular assist device) present    PT Received On: 08/27/17  PT Start Time: 0950     PT Stop Time: 1017    PT Total Time (min): 27 min       Billable Minutes:  Therapeutic Activity 14 min and Therapeutic Exercise 13 min    Treatment Type: Treatment  PT/PTA: PT     PTA Visit Number: 0       General Precautions: Standard, LVAD, fall, sternal, aspiration  Orthopedic Precautions: N/A   Braces:      Do you have any cultural, spiritual, U[place] conflicts, given your current situation?: none noted     Subjective:  Communicated with nurse prior to session.      Pain/Comfort  Pain Rating 1: 0/10  Pain Rating Post-Intervention 1: 0/10    Objective:   Patient found with: telemetry, arterial line, central line, delgado catheter, PICC line (CVP, dialysis catheter)    Functional Mobility:  Bed Mobility:   Rolling/Turning to Left:  (not tested. pt was sitting in chair for treatment. )    Transfers:  Sit <> Stand Assistance: Total Assistance (total x 2. pt was not able to come to upright posture with standing.  pt stood to be cleaned by nursing. )  Sit <> Stand Assistive Device: No Assistive Device    Gait:   Gait Distance: not tested. pt was not able come to upright posture with standing.     Therapeutic Activities and Exercises:  Pt performed AROM there exer BLE in sitting x 15 x 2 reps with rest periods as needed.     AM-PAC 6 CLICK MOBILITY  How much help from another person does this patient currently need?   1 = Unable, Total/Dependent Assistance  2 = A lot, Maximum/Moderate Assistance  3 = A little, Minimum/Contact Guard/Supervision  4 = None, Modified [place]/Independent    Turning over in bed (including adjusting bedclothes, sheets and blankets)?: 2  Sitting down on and standing up from a chair with arms (e.g., wheelchair, bedside commode, etc.): 2  Moving from lying on back to sitting on the side of the bed?: 2  Moving to  and from a bed to a chair (including a wheelchair)?: 2  Need to walk in hospital room?: 1  Climbing 3-5 steps with a railing?: 1  Total Score: 10    AM-PAC Raw Score CMS G-Code Modifier Level of Impairment Assistance   6 % Total / Unable   7 - 9 CM 80 - 100% Maximal Assist   10 - 14 CL 60 - 80% Moderate Assist   15 - 19 CK 40 - 60% Moderate Assist   20 - 22 CJ 20 - 40% Minimal Assist   23 CI 1-20% SBA / CGA   24 CH 0% Independent/ Mod I     Patient left up in chair with all lines intact, call button in reach and wife present.    Assessment:  Suman Hayden is a 67 y.o. male with a medical diagnosis of LVAD (left ventricular assist device) present and presents with decreased mobility, balance, transfers and decreased distance ambulated. Pt will benefit from cont skilled PT 6x/wk to progress physically and return pt to highest functional level possible. Pt will need inpt rehab when medically stable.  Pt is s/p  LVAD HM 3 placement 8/16, sternal washout 8/17, chest closure. 8/18. Pt was re-intubated 8/9 and extubated 8/13/17.    Rehab identified problem list/impairments: Rehab identified problem list/impairments: weakness, impaired endurance, impaired functional mobilty, gait instability, impaired balance, decreased lower extremity function    Rehab potential is fair.    Activity tolerance: Fair    Discharge recommendations: Discharge Facility/Level Of Care Needs: rehabilitation facility     Barriers to discharge: Barriers to Discharge: Decreased caregiver support (wife will not be able to assist pt at current functional level. )    Equipment recommendations: Equipment Needed After Discharge:  (will determine DME needs closer to discharge. )     GOALS:    Physical Therapy Goals        Problem: Physical Therapy Goal    Goal Priority Disciplines Outcome Goal Variances Interventions   Physical Therapy Goal     PT/OT, PT Ongoing (interventions implemented as appropriate)     Description:  Goals to be met by: 9/12/17      Patient will increase functional independence with mobility by performin. Supine to sit with MInimal Assistance- not met  2. Sit to supine with MInimal Assistance - not met  3. Sit to stand transfer with Minimal Assistance- not met  4. Bed to chair transfer with Minimal Assistance- not met  5. Gait  x 50 feet with Minimal Assistance. - not met  6. Lower extremity exercise program x15 reps, with supervision, in order to increase LE strength and (I) with functional mobility. - not met                        PLAN:    Patient to be seen 6 x/week  to address the above listed problems via gait training, therapeutic activities, therapeutic exercises  Plan of Care expires: 17  Plan of Care reviewed with: patient, spouse         Astridmichael Whaley, PT  2017

## 2017-08-27 NOTE — PROGRESS NOTES
Daily E and M and VAD Interrogation Note    Reason for Visit:  Patient is seen in follow up for management of:  [] HeartMate II  [] Heartware [] Total artificial heart       [] ECMO           [x] Other - HM III     Interval History:  [] Interval history unobtainable due to intubation.  The [x] implant/[] explant date was 7/27/17    Events -  NAEON.  Making good urine on lasix gtt.  Ate a little bit more yesterday       Review of Systems:   Negative except as above.      Medications:  Current Facility-Administered Medications   Medication Dose Route Frequency Provider Last Rate Last Dose    0.9%  NaCl infusion (for blood administration)   Intravenous Q24H PRN Andres Uriarte MD        0.9%  NaCl infusion (for blood administration)   Intravenous Q24H PRN Danielle Conteh MD        albumin human 5% bottle 500 mL  500 mL Intravenous PRN Shayne Espinoza MD   500 mL at 08/09/17 0700    albuterol nebulizer solution 2.5 mg  2.5 mg Nebulization Q4H PRN Shayne Espinoza MD        amlodipine tablet 2.5 mg  2.5 mg Oral Daily Andres Uriarte MD   2.5 mg at 08/27/17 0831    bisacodyl suppository 10 mg  10 mg Rectal Daily PRN Shayne Espinoza MD   10 mg at 08/06/17 2036    dextrose 50% injection 12.5 g  12.5 g Intravenous PRN Charbel Ritter MD   12.5 g at 08/25/17 1154    DOBUtamine 1000 mg in D5W 250 mL infusion (premix non-titrating)  5 mcg/kg/min (Dosing Weight) Intravenous Continuous Sunny Downing MD 6 mL/hr at 08/27/17 1704 5 mcg/kg/min at 08/27/17 1704    EPINEPHrine (ADRENALIN) 4 mg in sodium chloride 0.9% 250 mL infusion  0.01 mcg/kg/min (Dosing Weight) Intravenous Continuous Shayne Espinoza MD   Stopped at 08/15/17 1500    ertapenem (INVANZ) 0.5 g in sodium chloride 0.9% 50 mL IVPB  0.5 g Intravenous Q24H Angie Torres  mL/hr at 08/26/17 2007 0.5 g at 08/26/17 2007    fat emulsion 20% infusion 250 mL  250 mL Intravenous Daily Andres Uriarte MD         furosemide (LASIX) 250 mg in sodium chloride 0.9 % 250 mL infusion (non-titrating)  10 mg/hr Intravenous Continuous Andres Uriarte MD 10 mL/hr at 08/27/17 1704 10 mg/hr at 08/27/17 1704    glucagon (human recombinant) injection 1 mg  1 mg Intramuscular PRN Charbel Ritter MD        heparin 25,000 units in dextrose 5% 250 mL (100 units/mL) infusion (heparin infusion)  500 Units/hr Intravenous Continuous Andres Uriarte MD 5 mL/hr at 08/27/17 1704 500 Units/hr at 08/27/17 1704    hydrALAZINE injection 10 mg  10 mg Intravenous Q6H PRN Shlomo Serrato MD   10 mg at 08/27/17 0227    insulin aspart pen 0-5 Units  0-5 Units Subcutaneous Q4H PRN Charbel Ritter MD   2 Units at 08/10/17 0751    ondansetron injection 4 mg  4 mg Intravenous Q6H PRN Shayne Espinoza MD   4 mg at 08/25/17 1449    pantoprazole 40 mg in dextrose 5 % 100 mL infusion (ready to mix system)  8 mg/hr Intravenous Continuous Sunny Downing MD 5 mL/hr at 08/27/17 1704 2 mg/hr at 08/27/17 1704    potassium chloride SA CR tablet 40 mEq  40 mEq Oral Daily Andres Uriarte MD   40 mEq at 08/27/17 0831    sodium chloride 0.9% flush 10 mL  10 mL Intravenous Q6H Sunny Downing MD   10 mL at 08/27/17 1800    And    sodium chloride 0.9% flush 10 mL  10 mL Intravenous PRN Sunny Downing MD   10 mL at 08/10/17 1151    sodium chloride 0.9% flush 10 mL  10 mL Intravenous Q6H Sunny Downing MD   10 mL at 08/27/17 1800    And    sodium chloride 0.9% flush 10 mL  10 mL Intravenous PRN Sunny Downing MD        TPN ADULT CENTRAL LINE CUSTOM   Intravenous Continuous Andres Uriarte MD 40 mL/hr at 08/27/17 1704      TPN ADULT CENTRAL LINE CUSTOM   Intravenous Continuous Andres Uriarte MD         Physical Examination:  Vital Signs:   Vitals:    08/27/17 1700   BP:    Pulse: 82   Resp: (!) 31   Temp:      Cardiovascular:  [x] Regular rate and rhythm [] Irregular []  None (MATT) []  Other  []  No edema [x]  Edema present  [x]   Clear to auscultation  []  Rales to []  Coarse  []  No rales but   [] Pedal Pulses absent  [x]  Pulses  + throughout  Skin:  Incision is [x]  Clean, dry and intact.  []  Other   Sternum:  [x]  Stable []  Unstable  Driveline(s):   [x]  Clean, dry and intact. []  Other     Labs:  BMP  Lab Results   Component Value Date     (H) 08/27/2017    K 4.0 08/27/2017     08/27/2017    CO2 31 (H) 08/27/2017    BUN 85 (H) 08/27/2017    CREATININE 2.0 (H) 08/27/2017    CALCIUM 8.6 (L) 08/27/2017    ANIONGAP 9 08/27/2017    ESTGFRAFRICA 38.8 (A) 08/27/2017    EGFRNONAA 33.5 (A) 08/27/2017     Magnesium   Date Value Ref Range Status   08/27/2017 2.0 1.6 - 2.6 mg/dL Final     Lab Results   Component Value Date    WBC 14.43 (H) 08/27/2017    HGB 8.4 (L) 08/27/2017    HCT 26.4 (L) 08/27/2017    MCV 90 08/27/2017     08/27/2017     Lab Results   Component Value Date    INR 1.7 (H) 08/27/2017    INR 1.5 (H) 08/26/2017    INR 1.4 (H) 08/25/2017     BNP   Date Value Ref Range Status   08/25/2017 3,601 (H) 0 - 99 pg/mL Final     Comment:     Values of less than 100 pg/ml are consistent with non-CHF populations.   08/23/2017 3,515 (H) 0 - 99 pg/mL Final     Comment:     Values of less than 100 pg/ml are consistent with non-CHF populations.   08/21/2017 1,804 (H) 0 - 99 pg/mL Final     Comment:     Values of less than 100 pg/ml are consistent with non-CHF populations.     LD   Date Value Ref Range Status   08/27/2017 373 (H) 110 - 260 U/L Final     Comment:     Results are increased in hemolyzed samples.   08/26/2017 301 (H) 110 - 260 U/L Final     Comment:     Results are increased in hemolyzed samples.   08/25/2017 327 (H) 110 - 260 U/L Final     Comment:     Results are increased in hemolyzed samples.     X-Rays:  [x]  I reviewed today's Chest x-ray    Procedure:  Device Interrogation including analysis of device parameters.  Current Settings   [x]  Ventricular Assist Device  []  Total Artificial Heart  interrogated  Review of device function is [x]  Stable []  Other   TXP LVAD INTERROGATIONS 8/27/2017 8/27/2017 8/27/2017 8/27/2017 8/27/2017 8/27/2017 8/27/2017   Type HeartMate3 HeartMate3 HeartMate3 HeartMate3 HeartMate3 HeartMate3 HeartMate3   Flow 4.1 4.1 4.1 4.1 4.3 4.5 4.2   Speed 5200 5200 5200 5200 5200 5200 5200   PI 4.0 4.0 3.8 3.7 3.5 3.4 3.9   Power ([place]) 3.6 3.6 3.6 3.7 3.5 3.6 3.6   LSL 4800 4800 4800 4800 4800 4800 4800   Pulsatility - - Pulse - - - Pulse   Some recent data might be hidden       Assessment:  [x]  Primary Cardiomyopathy [x]  Congestive Heart Failure   []  Atrial Fibrillation []  Ventricular Tachycardia   [x]  Aftercare cardiac device [x]  Long term (current) use of anticoagulants   []  Ventilator-associated pneumonia []  Pneumonia viral, unspecified   []  Pneumonia, bacterial, unspecified []  Pneumonia, organism unspecified   [x]  Hemorrhage of GI tract, unspecified    []  Nosebleed []  Driveline infection   []  Infection VAD device []  New onset of    []        Plan:  [x]  Interval history obtained from ICU attending team member during rounding today  [x]  VAD/MATT teaching performed with patient  [x]  Mobilization / Physical Therapy ongoing  [x]  Anticoagulation [x]  Ongoing []  Held  []  Studies ordered  []   To OR today for closure.       Total time spent was 30 minutes.  Of which more than 50 percent of the care dominated counseling and coordinating care with different team members. The VAD was interrogated and all parameters were WNL and no significant findings were found in the history. All these findings are documented in the note above.    Neuro:  - Alert and oriented    Resp:  - Extubated  - Resp cultures with ESBL - Ertapenem per ID   - Minimize supplemental O2  - Pulmonary toilet    CV:  - HDS; LVAD numbers stable  -   - Dobutamine at 2.5  - Dopamine at 3  - Norvasc   - Hold  given GI bleed  - LVAD numbers stable      Heme:  - Hgb/Hct stable  - 1 unit PRBC  given overnight  - INR 1.5 this AM   - PICC in place  - Holding ASA 2/2 bleeding  - Coumadin - 5mg   - Heparin - PTT 40-50 today    ID:  - afebrile  - Ertapenem started for ESBL pos resp culture  - WBC 14  - PICC replaced 8/17   - Appreciate ID recs  - continue to monitor     Renal:  - Singer in place  - Strict I/Os   - Excellent UOP yesterday   - Lasix gtt restarted at 10/hr  - BUN/Cr stable  - Nephro is following.      FEN/GI:  - Clears   - Protonix gtt at 2  - Replace lytes PRN  - Continue TPN    Endo:  - Endocrine following, appreciate assistance  - Accuchecks  - Insulin ssi    Dispo:  - Continue ICU care.  - Likely stepdown tomorrow    Date of service:  08/27/2017     Andres Uriarte MD

## 2017-08-27 NOTE — PROGRESS NOTES
"[facility]  Nephrology  Progress Note    Patient Name: Suman Hayden  MRN: [id number]  Admission Date: 7/18/2017  Hospital Length of Stay: 40 days  Attending Provider: Sunny Downing MD   Primary Care Physician: Joe Ernst MD  Principal Problem:LVAD (left ventricular assist device) present    Subjective:     HPI: Mr. Will is a 66 yo AAM with PMHx of NICM, HTN, HLD, and CKD III who presented to the hospital on 7/18 after syncopal episode at home.  He underwent LVAD placement on 7/27.  Labs that day showed a spike in his SCr to 1.5, but he quickly returned to baseline (1.2-1.3) and stayed stable until 08/01 when he increased to 1.5 and has steadily remained above baseline since, up to 2.3 on consult.  He has remained on lasix infusion with intermittent dosages of diuril to aid in diuresis.  On 08/3, there was a reported decrease in LVAD flows which resolved with decrease in lasix infusion and administration of 1 unit of PRBCs, likely causing decrease renal perfusion leading to an ischemic event.  He continues with adequate UOP on lasix gtt. He was transferred to the floor from ICU on 08/08 and was noted to become hypotensive with a doppler pressure of 58, since been requiring pressors for BP support.  Nephrology was consulted for INDIRA and decreased UOP.    Interval History:   No events overnight. Patient sitting in bedside chair this AM. Reports SOB that "comes and goes". Tells me he is thirsty but is unable to drink 2/2 fluid restriction. After discussion with nurse, patient not taking much in at all. Only had 350cc so far this AM and consumed <1L yesterday.   UOP > 5L on lasix infusion.     Review of patient's allergies indicates:  No Known Allergies  Current Facility-Administered Medications   Medication Frequency    0.9%  NaCl infusion (for blood administration) Q24H PRN    0.9%  NaCl infusion (for blood administration) Q24H PRN    albumin human 5% bottle 500 mL PRN    albuterol nebulizer " solution 2.5 mg Q4H PRN    amlodipine tablet 2.5 mg Daily    bisacodyl suppository 10 mg Daily PRN    dextrose 50% injection 12.5 g PRN    DOBUtamine 1000 mg in D5W 250 mL infusion (premix non-titrating) Continuous    EPINEPHrine (ADRENALIN) 4 mg in sodium chloride 0.9% 250 mL infusion Continuous    ertapenem (INVANZ) 0.5 g in sodium chloride 0.9% 50 mL IVPB Q24H    furosemide (LASIX) 250 mg in sodium chloride 0.9 % 250 mL infusion (non-titrating) Continuous    glucagon (human recombinant) injection 1 mg PRN    heparin 25,000 units in dextrose 5% 250 mL (100 units/mL) infusion (heparin infusion) Continuous    hydrALAZINE injection 10 mg Q6H PRN    insulin aspart pen 0-5 Units Q4H PRN    ondansetron injection 4 mg Q6H PRN    pantoprazole 40 mg in dextrose 5 % 100 mL infusion (ready to mix system) Continuous    potassium chloride SA CR tablet 40 mEq Daily    sodium chloride 0.9% flush 10 mL Q6H    And    sodium chloride 0.9% flush 10 mL PRN    sodium chloride 0.9% flush 10 mL Q6H    And    sodium chloride 0.9% flush 10 mL PRN    TPN ADULT CENTRAL LINE CUSTOM Continuous       Objective:     Vital Signs (Most Recent):  Temp: 99 °F (37.2 °C) (08/27/17 0700)  Pulse: 82 (08/27/17 1000)  Resp: (!) 45 (08/27/17 1000)  BP: (!) 90/0 (08/27/17 0700)  SpO2: 97 % (08/27/17 0715)  O2 Device (Oxygen Therapy): room air (08/27/17 1000) Vital Signs (24h Range):  Temp:  [98.7 °F (37.1 °C)-99.3 °F (37.4 °C)] 99 °F (37.2 °C)  Pulse:  [] 82  Resp:  [21-45] 45  SpO2:  [96 %-100 %] 97 %  BP: (76-96)/(0) 90/0  Arterial Line BP: ()/(53-85) 100/59     Weight: 86.6 kg (190 lb 14.7 oz) (08/27/17 0412)  Body mass index is 29.03 kg/m².  Body surface area is 2.04 meters squared.    I/O last 3 completed shifts:  In: 3983.2 [P.O.:600; I.V.:1056.2; Blood:350; IV Piggyback:50]  Out: 8705 [Urine:8705]    Physical Exam   Constitutional: He is oriented to person, place, and time. He appears well-developed and well-nourished.  No distress.   HENT:   Head: Normocephalic and atraumatic.   Eyes: Conjunctivae and EOM are normal.   Cardiovascular:   LVAD hum   Pulmonary/Chest: Effort normal. He has no rales.   Musculoskeletal: He exhibits no edema or deformity.   Neurological: He is alert and oriented to person, place, and time.   Skin: Skin is warm and dry.       Significant Labs:  ABGs: No results for input(s): PH, PCO2, HCO3, POCSATURATED, BE in the last 168 hours.  CBC:   Recent Labs  Lab 08/27/17  0404   WBC 12.69  12.69   RBC 2.75*  2.75*   HGB 7.9*  7.9*   HCT 24.2*  24.2*     276   MCV 88  88   MCH 28.7  28.7   MCHC 32.6  32.6     CMP:   Recent Labs  Lab 08/27/17  0404   GLU 96  96   CALCIUM 8.4*  8.4*   ALBUMIN 2.0*   PROT 6.5   *  149*   K 3.8  3.8   CO2 29  29     108   BUN 87*  87*   CREATININE 2.0*  2.0*   ALKPHOS 172*   ALT 24   AST 27   BILITOT 1.6*     All labs within the past 24 hours have been reviewed.     Significant Imaging:  Labs: Reviewed    Assessment/Plan:     Acute kidney injury superimposed on CKD    - see prior note for additional details  - renal function stable today        Hypernatremia    - 2/2 dehydration  - iatrogenic from lasix-induced polyuria  - Na continues to rise; concerned that dehydration will soon result in a pre-renal INDIRA  - recommend decreasing lasix for UOP < 3L per day  - current free water deficit is 2.8L; not including ongoing losses from polyuria  - recommend starting D5W at 100cc/hr to correct deficit            Caprice Dangelo, PGY-5  Nephrology Fellow  [facility]  Pager: [phone]

## 2017-08-27 NOTE — PLAN OF CARE
Problem: Patient Care Overview  Goal: Plan of Care Review  Patient's vital signs stable at this time. Prn hydralazine given once per order for MAP >80. Patient continues on room air with SaO2 97-96%. Patient continues on heparin, lasix, dobutamine, protonix, and TPN drips per order. Urine output good 140-340cc/hr. Patient turned Q2H. Blood glucose monitored Q4H. Patient's wife at bedside throughout night. Plan of care reviewed with patient and wife. Will continue to monitor.

## 2017-08-27 NOTE — SUBJECTIVE & OBJECTIVE
"Interval History:   No events overnight. Patient sitting in bedside chair this AM. Reports SOB that "comes and goes". Tells me he is thirsty but is unable to drink 2/2 fluid restriction. After discussion with nurse, patient not taking much in at all. Only had 350cc so far this AM and consumed <1L yesterday.   UOP > 5L on lasix infusion.     Review of patient's allergies indicates:  No Known Allergies  Current Facility-Administered Medications   Medication Frequency    0.9%  NaCl infusion (for blood administration) Q24H PRN    0.9%  NaCl infusion (for blood administration) Q24H PRN    albumin human 5% bottle 500 mL PRN    albuterol nebulizer solution 2.5 mg Q4H PRN    amlodipine tablet 2.5 mg Daily    bisacodyl suppository 10 mg Daily PRN    dextrose 50% injection 12.5 g PRN    DOBUtamine 1000 mg in D5W 250 mL infusion (premix non-titrating) Continuous    EPINEPHrine (ADRENALIN) 4 mg in sodium chloride 0.9% 250 mL infusion Continuous    ertapenem (INVANZ) 0.5 g in sodium chloride 0.9% 50 mL IVPB Q24H    furosemide (LASIX) 250 mg in sodium chloride 0.9 % 250 mL infusion (non-titrating) Continuous    glucagon (human recombinant) injection 1 mg PRN    heparin 25,000 units in dextrose 5% 250 mL (100 units/mL) infusion (heparin infusion) Continuous    hydrALAZINE injection 10 mg Q6H PRN    insulin aspart pen 0-5 Units Q4H PRN    ondansetron injection 4 mg Q6H PRN    pantoprazole 40 mg in dextrose 5 % 100 mL infusion (ready to mix system) Continuous    potassium chloride SA CR tablet 40 mEq Daily    sodium chloride 0.9% flush 10 mL Q6H    And    sodium chloride 0.9% flush 10 mL PRN    sodium chloride 0.9% flush 10 mL Q6H    And    sodium chloride 0.9% flush 10 mL PRN    TPN ADULT CENTRAL LINE CUSTOM Continuous       Objective:     Vital Signs (Most Recent):  Temp: 99 °F (37.2 °C) (08/27/17 0700)  Pulse: 82 (08/27/17 1000)  Resp: (!) 45 (08/27/17 1000)  BP: (!) 90/0 (08/27/17 0700)  SpO2: 97 % " (08/27/17 0715)  O2 Device (Oxygen Therapy): room air (08/27/17 1000) Vital Signs (24h Range):  Temp:  [98.7 °F (37.1 °C)-99.3 °F (37.4 °C)] 99 °F (37.2 °C)  Pulse:  [] 82  Resp:  [21-45] 45  SpO2:  [96 %-100 %] 97 %  BP: (76-96)/(0) 90/0  Arterial Line BP: ()/(53-85) 100/59     Weight: 86.6 kg (190 lb 14.7 oz) (08/27/17 0412)  Body mass index is 29.03 kg/m².  Body surface area is 2.04 meters squared.    I/O last 3 completed shifts:  In: 3983.2 [P.O.:600; I.V.:1056.2; Blood:350; IV Piggyback:50]  Out: 8705 [Urine:8705]    Physical Exam   Constitutional: He is oriented to person, place, and time. He appears well-developed and well-nourished. No distress.   HENT:   Head: Normocephalic and atraumatic.   Eyes: Conjunctivae and EOM are normal.   Cardiovascular:   LVAD hum   Pulmonary/Chest: Effort normal. He has no rales.   Musculoskeletal: He exhibits no edema or deformity.   Neurological: He is alert and oriented to person, place, and time.   Skin: Skin is warm and dry.       Significant Labs:  ABGs: No results for input(s): PH, PCO2, HCO3, POCSATURATED, BE in the last 168 hours.  CBC:   Recent Labs  Lab 08/27/17  0404   WBC 12.69  12.69   RBC 2.75*  2.75*   HGB 7.9*  7.9*   HCT 24.2*  24.2*     276   MCV 88  88   MCH 28.7  28.7   MCHC 32.6  32.6     CMP:   Recent Labs  Lab 08/27/17  0404   GLU 96  96   CALCIUM 8.4*  8.4*   ALBUMIN 2.0*   PROT 6.5   *  149*   K 3.8  3.8   CO2 29  29     108   BUN 87*  87*   CREATININE 2.0*  2.0*   ALKPHOS 172*   ALT 24   AST 27   BILITOT 1.6*     All labs within the past 24 hours have been reviewed.     Significant Imaging:  Labs: Reviewed

## 2017-08-28 PROBLEM — E87.8 ELECTROLYTE ABNORMALITY: Status: RESOLVED | Noted: 2017-08-24 | Resolved: 2017-08-28

## 2017-08-28 LAB
ALBUMIN SERPL BCP-MCNC: 2 G/DL
ALBUMIN SERPL BCP-MCNC: 2 G/DL
ALP SERPL-CCNC: 163 U/L
ALP SERPL-CCNC: 163 U/L
ALT SERPL W/O P-5'-P-CCNC: 22 U/L
ALT SERPL W/O P-5'-P-CCNC: 22 U/L
ANION GAP SERPL CALC-SCNC: 11 MMOL/L
ANION GAP SERPL CALC-SCNC: 11 MMOL/L
APTT BLDCRRT: 33.5 SEC
AST SERPL-CCNC: 25 U/L
AST SERPL-CCNC: 25 U/L
BASOPHILS # BLD AUTO: 0.07 K/UL
BASOPHILS # BLD AUTO: 0.07 K/UL
BASOPHILS NFR BLD: 0.5 %
BASOPHILS NFR BLD: 0.5 %
BILIRUB DIRECT SERPL-MCNC: 1.1 MG/DL
BILIRUB SERPL-MCNC: 1.7 MG/DL
BILIRUB SERPL-MCNC: 1.7 MG/DL
BNP SERPL-MCNC: 3020 PG/ML
BUN SERPL-MCNC: 78 MG/DL
BUN SERPL-MCNC: 78 MG/DL
CALCIUM SERPL-MCNC: 8.6 MG/DL
CALCIUM SERPL-MCNC: 8.6 MG/DL
CHLORIDE SERPL-SCNC: 108 MMOL/L
CHLORIDE SERPL-SCNC: 108 MMOL/L
CO2 SERPL-SCNC: 31 MMOL/L
CO2 SERPL-SCNC: 31 MMOL/L
CREAT SERPL-MCNC: 1.8 MG/DL
CREAT SERPL-MCNC: 1.8 MG/DL
CRP SERPL-MCNC: 67.4 MG/L
DIFFERENTIAL METHOD: ABNORMAL
DIFFERENTIAL METHOD: ABNORMAL
EOSINOPHIL # BLD AUTO: 0.5 K/UL
EOSINOPHIL # BLD AUTO: 0.5 K/UL
EOSINOPHIL NFR BLD: 3.4 %
EOSINOPHIL NFR BLD: 3.4 %
ERYTHROCYTE [DISTWIDTH] IN BLOOD BY AUTOMATED COUNT: 17.2 %
ERYTHROCYTE [DISTWIDTH] IN BLOOD BY AUTOMATED COUNT: 17.2 %
EST. GFR  (AFRICAN AMERICAN): 44 ML/MIN/1.73 M^2
EST. GFR  (AFRICAN AMERICAN): 44 ML/MIN/1.73 M^2
EST. GFR  (NON AFRICAN AMERICAN): 38.1 ML/MIN/1.73 M^2
EST. GFR  (NON AFRICAN AMERICAN): 38.1 ML/MIN/1.73 M^2
GLUCOSE SERPL-MCNC: 102 MG/DL
GLUCOSE SERPL-MCNC: 102 MG/DL
HCT VFR BLD AUTO: 25.8 %
HCT VFR BLD AUTO: 25.8 %
HGB BLD-MCNC: 8.3 G/DL
HGB BLD-MCNC: 8.3 G/DL
INR PPP: 2
LDH SERPL L TO P-CCNC: 319 U/L
LYMPHOCYTES # BLD AUTO: 2 K/UL
LYMPHOCYTES # BLD AUTO: 2 K/UL
LYMPHOCYTES NFR BLD: 13.8 %
LYMPHOCYTES NFR BLD: 13.8 %
MAGNESIUM SERPL-MCNC: 1.8 MG/DL
MCH RBC QN AUTO: 28.7 PG
MCH RBC QN AUTO: 28.7 PG
MCHC RBC AUTO-ENTMCNC: 32.2 G/DL
MCHC RBC AUTO-ENTMCNC: 32.2 G/DL
MCV RBC AUTO: 89 FL
MCV RBC AUTO: 89 FL
MONOCYTES # BLD AUTO: 0.8 K/UL
MONOCYTES # BLD AUTO: 0.8 K/UL
MONOCYTES NFR BLD: 5.5 %
MONOCYTES NFR BLD: 5.5 %
NEUTROPHILS # BLD AUTO: 10.9 K/UL
NEUTROPHILS # BLD AUTO: 10.9 K/UL
NEUTROPHILS NFR BLD: 76.8 %
NEUTROPHILS NFR BLD: 76.8 %
PHOSPHATE SERPL-MCNC: 3.5 MG/DL
PHOSPHATE SERPL-MCNC: 3.7 MG/DL
PHOSPHATE SERPL-MCNC: 4 MG/DL
PLATELET # BLD AUTO: 281 K/UL
PLATELET # BLD AUTO: 281 K/UL
PMV BLD AUTO: 11.5 FL
PMV BLD AUTO: 11.5 FL
POCT GLUCOSE: 103 MG/DL (ref 70–110)
POCT GLUCOSE: 110 MG/DL (ref 70–110)
POCT GLUCOSE: 113 MG/DL (ref 70–110)
POCT GLUCOSE: 115 MG/DL (ref 70–110)
POCT GLUCOSE: 117 MG/DL (ref 70–110)
POCT GLUCOSE: 118 MG/DL (ref 70–110)
POCT GLUCOSE: 119 MG/DL (ref 70–110)
POCT GLUCOSE: 122 MG/DL (ref 70–110)
POCT GLUCOSE: 124 MG/DL (ref 70–110)
POCT GLUCOSE: 129 MG/DL (ref 70–110)
POTASSIUM SERPL-SCNC: 4 MMOL/L
POTASSIUM SERPL-SCNC: 4 MMOL/L
POTASSIUM SERPL-SCNC: 4.4 MMOL/L
POTASSIUM SERPL-SCNC: 4.5 MMOL/L
PREALB SERPL-MCNC: 13 MG/DL
PROT SERPL-MCNC: 6.7 G/DL
PROT SERPL-MCNC: 6.7 G/DL
PROTHROMBIN TIME: 20 SEC
RBC # BLD AUTO: 2.89 M/UL
RBC # BLD AUTO: 2.89 M/UL
SODIUM SERPL-SCNC: 150 MMOL/L
SODIUM SERPL-SCNC: 150 MMOL/L
WBC # BLD AUTO: 14.23 K/UL
WBC # BLD AUTO: 14.23 K/UL

## 2017-08-28 PROCEDURE — 80053 COMPREHEN METABOLIC PANEL: CPT

## 2017-08-28 PROCEDURE — 83880 ASSAY OF NATRIURETIC PEPTIDE: CPT

## 2017-08-28 PROCEDURE — 97110 THERAPEUTIC EXERCISES: CPT

## 2017-08-28 PROCEDURE — C9113 INJ PANTOPRAZOLE SODIUM, VIA: HCPCS | Performed by: NURSE PRACTITIONER

## 2017-08-28 PROCEDURE — 63600175 PHARM REV CODE 636 W HCPCS: Performed by: GENERAL PRACTICE

## 2017-08-28 PROCEDURE — 84100 ASSAY OF PHOSPHORUS: CPT

## 2017-08-28 PROCEDURE — 83615 LACTATE (LD) (LDH) ENZYME: CPT

## 2017-08-28 PROCEDURE — 80076 HEPATIC FUNCTION PANEL: CPT

## 2017-08-28 PROCEDURE — 63600175 PHARM REV CODE 636 W HCPCS: Performed by: NURSE PRACTITIONER

## 2017-08-28 PROCEDURE — B4185 PARENTERAL SOL 10 GM LIPIDS: HCPCS | Performed by: STUDENT IN AN ORGANIZED HEALTH CARE EDUCATION/TRAINING PROGRAM

## 2017-08-28 PROCEDURE — 25000003 PHARM REV CODE 250: Performed by: STUDENT IN AN ORGANIZED HEALTH CARE EDUCATION/TRAINING PROGRAM

## 2017-08-28 PROCEDURE — 97530 THERAPEUTIC ACTIVITIES: CPT

## 2017-08-28 PROCEDURE — 63600175 PHARM REV CODE 636 W HCPCS: Performed by: STUDENT IN AN ORGANIZED HEALTH CARE EDUCATION/TRAINING PROGRAM

## 2017-08-28 PROCEDURE — 93750 INTERROGATION VAD IN PERSON: CPT | Mod: ,,, | Performed by: THORACIC SURGERY (CARDIOTHORACIC VASCULAR SURGERY)

## 2017-08-28 PROCEDURE — 99232 SBSQ HOSP IP/OBS MODERATE 35: CPT | Mod: GC,,, | Performed by: SURGERY

## 2017-08-28 PROCEDURE — 25000003 PHARM REV CODE 250: Performed by: THORACIC SURGERY (CARDIOTHORACIC VASCULAR SURGERY)

## 2017-08-28 PROCEDURE — 99232 SBSQ HOSP IP/OBS MODERATE 35: CPT | Mod: ,,, | Performed by: INTERNAL MEDICINE

## 2017-08-28 PROCEDURE — 85025 COMPLETE CBC W/AUTO DIFF WBC: CPT

## 2017-08-28 PROCEDURE — 85730 THROMBOPLASTIN TIME PARTIAL: CPT

## 2017-08-28 PROCEDURE — 20600001 HC STEP DOWN PRIVATE ROOM

## 2017-08-28 PROCEDURE — 85610 PROTHROMBIN TIME: CPT

## 2017-08-28 PROCEDURE — 84134 ASSAY OF PREALBUMIN: CPT

## 2017-08-28 PROCEDURE — 86140 C-REACTIVE PROTEIN: CPT

## 2017-08-28 PROCEDURE — 25000003 PHARM REV CODE 250: Performed by: INTERNAL MEDICINE

## 2017-08-28 PROCEDURE — 99233 SBSQ HOSP IP/OBS HIGH 50: CPT | Mod: 24,,, | Performed by: THORACIC SURGERY (CARDIOTHORACIC VASCULAR SURGERY)

## 2017-08-28 PROCEDURE — 84132 ASSAY OF SERUM POTASSIUM: CPT

## 2017-08-28 PROCEDURE — 63600175 PHARM REV CODE 636 W HCPCS: Performed by: THORACIC SURGERY (CARDIOTHORACIC VASCULAR SURGERY)

## 2017-08-28 PROCEDURE — 63600175 PHARM REV CODE 636 W HCPCS: Performed by: INTERNAL MEDICINE

## 2017-08-28 PROCEDURE — 27000248 HC VAD-ADDITIONAL DAY

## 2017-08-28 PROCEDURE — 83735 ASSAY OF MAGNESIUM: CPT | Mod: 91

## 2017-08-28 PROCEDURE — A4216 STERILE WATER/SALINE, 10 ML: HCPCS | Performed by: THORACIC SURGERY (CARDIOTHORACIC VASCULAR SURGERY)

## 2017-08-28 PROCEDURE — 99233 SBSQ HOSP IP/OBS HIGH 50: CPT | Mod: ,,, | Performed by: INTERNAL MEDICINE

## 2017-08-28 RX ORDER — PANTOPRAZOLE SODIUM 40 MG/10ML
40 INJECTION, POWDER, LYOPHILIZED, FOR SOLUTION INTRAVENOUS 2 TIMES DAILY
Status: DISCONTINUED | OUTPATIENT
Start: 2017-08-28 | End: 2017-09-05

## 2017-08-28 RX ORDER — WARFARIN SODIUM 5 MG/1
5 TABLET ORAL ONCE
Status: COMPLETED | OUTPATIENT
Start: 2017-08-28 | End: 2017-08-28

## 2017-08-28 RX ORDER — ONDANSETRON 2 MG/ML
4 INJECTION INTRAMUSCULAR; INTRAVENOUS ONCE
Status: COMPLETED | OUTPATIENT
Start: 2017-08-28 | End: 2017-08-28

## 2017-08-28 RX ORDER — POTASSIUM CHLORIDE 14.9 MG/ML
20 INJECTION INTRAVENOUS ONCE
Status: COMPLETED | OUTPATIENT
Start: 2017-08-28 | End: 2017-08-28

## 2017-08-28 RX ORDER — OXYCODONE HYDROCHLORIDE 5 MG/1
5 TABLET ORAL ONCE
Status: COMPLETED | OUTPATIENT
Start: 2017-08-28 | End: 2017-08-28

## 2017-08-28 RX ORDER — LANOLIN ALCOHOL/MO/W.PET/CERES
400 CREAM (GRAM) TOPICAL ONCE
Status: COMPLETED | OUTPATIENT
Start: 2017-08-28 | End: 2017-08-28

## 2017-08-28 RX ORDER — ACETAMINOPHEN 325 MG/1
650 TABLET ORAL ONCE
Status: DISCONTINUED | OUTPATIENT
Start: 2017-08-28 | End: 2017-08-31

## 2017-08-28 RX ORDER — ACETAMINOPHEN 325 MG/1
650 TABLET ORAL ONCE
Status: COMPLETED | OUTPATIENT
Start: 2017-08-28 | End: 2017-08-28

## 2017-08-28 RX ORDER — LANOLIN ALCOHOL/MO/W.PET/CERES
800 CREAM (GRAM) TOPICAL ONCE
Status: COMPLETED | OUTPATIENT
Start: 2017-08-28 | End: 2017-08-28

## 2017-08-28 RX ADMIN — Medication 10 ML: at 11:08

## 2017-08-28 RX ADMIN — POTASSIUM CHLORIDE 20 MEQ: 200 INJECTION, SOLUTION INTRAVENOUS at 12:08

## 2017-08-28 RX ADMIN — PANTOPRAZOLE SODIUM 40 MG: 40 INJECTION, POWDER, FOR SOLUTION INTRAVENOUS at 08:08

## 2017-08-28 RX ADMIN — WARFARIN SODIUM 5 MG: 5 TABLET ORAL at 04:08

## 2017-08-28 RX ADMIN — POTASSIUM CHLORIDE 40 MEQ: 1500 TABLET, EXTENDED RELEASE ORAL at 08:08

## 2017-08-28 RX ADMIN — ONDANSETRON 4 MG: 2 INJECTION INTRAMUSCULAR; INTRAVENOUS at 07:08

## 2017-08-28 RX ADMIN — Medication 10 ML: at 06:08

## 2017-08-28 RX ADMIN — MAGNESIUM OXIDE TAB 400 MG (241.3 MG ELEMENTAL MG) 400 MG: 400 (241.3 MG) TAB at 02:08

## 2017-08-28 RX ADMIN — Medication 10 ML: at 12:08

## 2017-08-28 RX ADMIN — AMLODIPINE BESYLATE 2.5 MG: 2.5 TABLET ORAL at 08:08

## 2017-08-28 RX ADMIN — ONDANSETRON 4 MG: 2 INJECTION INTRAMUSCULAR; INTRAVENOUS at 09:08

## 2017-08-28 RX ADMIN — ASCORBIC ACID, VITAMIN A PALMITATE, CHOLECALCIFEROL, THIAMINE HYDROCHLORIDE, RIBOFLAVIN-5 PHOSPHATE SODIUM, PYRIDOXINE HYDROCHLORIDE, NIACINAMIDE, DEXPANTHENOL, ALPHA-TOCOPHEROL ACETATE, VITAMIN K1, FOLIC ACID, BIOTIN, CYANOCOBALAMIN: 200; 3300; 200; 6; 3.6; 6; 40; 15; 10; 150; 600; 60; 5 INJECTION, SOLUTION INTRAVENOUS at 09:08

## 2017-08-28 RX ADMIN — OXYCODONE HYDROCHLORIDE 5 MG: 5 TABLET ORAL at 02:08

## 2017-08-28 RX ADMIN — MAGNESIUM OXIDE TAB 400 MG (241.3 MG ELEMENTAL MG) 800 MG: 400 (241.3 MG) TAB at 07:08

## 2017-08-28 RX ADMIN — HYDRALAZINE HYDROCHLORIDE 10 MG: 20 INJECTION INTRAMUSCULAR; INTRAVENOUS at 12:08

## 2017-08-28 RX ADMIN — DOBUTAMINE IN DEXTROSE 5 MCG/KG/MIN: 400 INJECTION, SOLUTION INTRAVENOUS at 04:08

## 2017-08-28 RX ADMIN — SOYBEAN OIL 250 ML: 20 INJECTION, SOLUTION INTRAVENOUS at 09:08

## 2017-08-28 RX ADMIN — MAGNESIUM OXIDE TAB 400 MG (241.3 MG ELEMENTAL MG) 400 MG: 400 (241.3 MG) TAB at 08:08

## 2017-08-28 RX ADMIN — SODIUM CHLORIDE 0.5 G: 9 INJECTION, SOLUTION INTRAVENOUS at 08:08

## 2017-08-28 RX ADMIN — HEPARIN SODIUM AND DEXTROSE 500 UNITS/HR: 10000; 5 INJECTION INTRAVENOUS at 09:08

## 2017-08-28 RX ADMIN — ACETAMINOPHEN 650 MG: 325 TABLET ORAL at 08:08

## 2017-08-28 NOTE — SUBJECTIVE & OBJECTIVE
Interval History:   Kidney function continues to improve, remains on lasix gtt (decreased this morning per primary team).  Net negative 3L/24h.  Worsening hypernatremia, up to 150 today.    Review of patient's allergies indicates:  No Known Allergies  Current Facility-Administered Medications   Medication Frequency    albumin human 5% bottle 500 mL PRN    albuterol nebulizer solution 2.5 mg Q4H PRN    Amino acid 5% - dextrose 15% (CLINIMIX-E) solution with additives.  CENTRAL LINE ONLY (1395 mOsm/L). 1L provides 50 gm AA, 150 gm CHO (510 kcal/L dextrose), Na 35, K 30, Mg 5, Ca 4.5, Acetate 80, Cl 39, Phos 15. Continuous    amlodipine tablet 2.5 mg Daily    bisacodyl suppository 10 mg Daily PRN    dextrose 50% injection 12.5 g PRN    DOBUtamine 1000 mg in D5W 250 mL infusion (premix non-titrating) Continuous    EPINEPHrine (ADRENALIN) 4 mg in sodium chloride 0.9% 250 mL infusion Continuous    ertapenem (INVANZ) 0.5 g in sodium chloride 0.9% 50 mL IVPB Q24H    fat emulsion 20% infusion 250 mL Daily    furosemide (LASIX) 250 mg in sodium chloride 0.9 % 250 mL infusion (non-titrating) Continuous    glucagon (human recombinant) injection 1 mg PRN    heparin 25,000 units in dextrose 5% 250 mL (100 units/mL) infusion (heparin infusion) Continuous    hydrALAZINE injection 10 mg Q6H PRN    insulin aspart pen 0-5 Units Q4H PRN    ondansetron injection 4 mg Q6H PRN    pantoprazole injection 40 mg BID    potassium chloride SA CR tablet 40 mEq Daily    sodium chloride 0.9% flush 10 mL Q6H    And    sodium chloride 0.9% flush 10 mL PRN    sodium chloride 0.9% flush 10 mL Q6H    And    sodium chloride 0.9% flush 10 mL PRN    TPN ADULT CENTRAL LINE CUSTOM Continuous    warfarin (COUMADIN) tablet 5 mg Once       Objective:     Vital Signs (Most Recent):  Temp: 99.2 °F (37.3 °C) (08/28/17 1100)  Pulse: 80 (08/28/17 1200)  Resp: (!) 40 (08/28/17 1200)  BP: (!) 86/0 (08/28/17 0730)  SpO2: 98 % (08/28/17  1200)  O2 Device (Oxygen Therapy): room air (08/28/17 0700) Vital Signs (24h Range):  Temp:  [97.8 °F (36.6 °C)-99.2 °F (37.3 °C)] 99.2 °F (37.3 °C)  Pulse:  [] 80  Resp:  [16-50] 40  SpO2:  [97 %-100 %] 98 %  BP: ()/(0) 86/0  Arterial Line BP: ()/(54-73) 90/60     Weight: 84.6 kg (186 lb 8.2 oz) (08/28/17 0329)  Body mass index is 28.36 kg/m².  Body surface area is 2.01 meters squared.    I/O last 3 completed shifts:  In: 3865 [P.O.:600; I.V.:1383]  Out: 9575 [Urine:9575]    Physical Exam   Constitutional: He is oriented to person, place, and time. He appears well-developed and well-nourished. No distress.   HENT:   Head: Normocephalic and atraumatic.   Eyes: EOM are normal.   Cardiovascular: Exam reveals no gallop and no friction rub.    No murmur heard.  Smooth LVAD hum   Pulmonary/Chest: Effort normal and breath sounds normal. No respiratory distress. He has no wheezes. He has no rales.   Abdominal: Soft. There is no tenderness.   Musculoskeletal: He exhibits edema (1+ lower ext.).   Neurological: He is alert and oriented to person, place, and time.   Skin: Skin is warm and dry. No rash noted. He is not diaphoretic.   Vitals reviewed.      Significant Labs:  CBC:   Recent Labs  Lab 08/28/17  0402   WBC 14.23*  14.23*   RBC 2.89*  2.89*   HGB 8.3*  8.3*   HCT 25.8*  25.8*     281   MCV 89  89   MCH 28.7  28.7   MCHC 32.2  32.2     CMP:   Recent Labs  Lab 08/28/17  0402 08/28/17  1102     102  --    CALCIUM 8.6*  8.6*  --    ALBUMIN 2.0*  2.0*  --    PROT 6.7  6.7  --    *  150*  --    K 4.0  4.0 4.4   CO2 31*  31*  --      108  --    BUN 78*  78*  --    CREATININE 1.8*  1.8*  --    ALKPHOS 163*  163*  --    ALT 22  22  --    AST 25  25  --    BILITOT 1.7*  1.7*  --

## 2017-08-28 NOTE — PROGRESS NOTES
[facility]  Nephrology  Progress Note    Patient Name: Suman Hayden  MRN: [id number]  Admission Date: 7/18/2017  Hospital Length of Stay: 41 days  Attending Provider: Sunny Downing MD   Primary Care Physician: Joe Ernst MD  Principal Problem:LVAD (left ventricular assist device) present    Subjective:     HPI: Mr. Will is a 68 yo AAM with PMHx of NICM, HTN, HLD, and CKD III who presented to the hospital on 7/18 after syncopal episode at home.  He underwent LVAD placement on 7/27.  Labs that day showed a spike in his SCr to 1.5, but he quickly returned to baseline (1.2-1.3) and stayed stable until 08/01 when he increased to 1.5 and has steadily remained above baseline since, up to 2.3 on consult.  He has remained on lasix infusion with intermittent dosages of diuril to aid in diuresis.  On 08/3, there was a reported decrease in LVAD flows which resolved with decrease in lasix infusion and administration of 1 unit of PRBCs, likely causing decrease renal perfusion leading to an ischemic event.  He continues with adequate UOP on lasix gtt. He was transferred to the floor from ICU on 08/08 and was noted to become hypotensive with a doppler pressure of 58, since been requiring pressors for BP support.  Nephrology was consulted for INDIRA and decreased UOP.    Interval History:   Kidney function continues to improve, remains on lasix gtt (decreased this morning per primary team).  Net negative 3L/24h.  Worsening hypernatremia, up to 150 today.    Review of patient's allergies indicates:  No Known Allergies  Current Facility-Administered Medications   Medication Frequency    albumin human 5% bottle 500 mL PRN    albuterol nebulizer solution 2.5 mg Q4H PRN    Amino acid 5% - dextrose 15% (CLINIMIX-E) solution with additives.  CENTRAL LINE ONLY (1395 mOsm/L). 1L provides 50 gm AA, 150 gm CHO (510 kcal/L dextrose), Na 35, K 30, Mg 5, Ca 4.5, Acetate 80, Cl 39, Phos 15. Continuous    amlodipine tablet  2.5 mg Daily    bisacodyl suppository 10 mg Daily PRN    dextrose 50% injection 12.5 g PRN    DOBUtamine 1000 mg in D5W 250 mL infusion (premix non-titrating) Continuous    EPINEPHrine (ADRENALIN) 4 mg in sodium chloride 0.9% 250 mL infusion Continuous    ertapenem (INVANZ) 0.5 g in sodium chloride 0.9% 50 mL IVPB Q24H    fat emulsion 20% infusion 250 mL Daily    furosemide (LASIX) 250 mg in sodium chloride 0.9 % 250 mL infusion (non-titrating) Continuous    glucagon (human recombinant) injection 1 mg PRN    heparin 25,000 units in dextrose 5% 250 mL (100 units/mL) infusion (heparin infusion) Continuous    hydrALAZINE injection 10 mg Q6H PRN    insulin aspart pen 0-5 Units Q4H PRN    ondansetron injection 4 mg Q6H PRN    pantoprazole injection 40 mg BID    potassium chloride SA CR tablet 40 mEq Daily    sodium chloride 0.9% flush 10 mL Q6H    And    sodium chloride 0.9% flush 10 mL PRN    sodium chloride 0.9% flush 10 mL Q6H    And    sodium chloride 0.9% flush 10 mL PRN    TPN ADULT CENTRAL LINE CUSTOM Continuous    warfarin (COUMADIN) tablet 5 mg Once       Objective:     Vital Signs (Most Recent):  Temp: 99.2 °F (37.3 °C) (08/28/17 1100)  Pulse: 80 (08/28/17 1200)  Resp: (!) 40 (08/28/17 1200)  BP: (!) 86/0 (08/28/17 0730)  SpO2: 98 % (08/28/17 1200)  O2 Device (Oxygen Therapy): room air (08/28/17 0700) Vital Signs (24h Range):  Temp:  [97.8 °F (36.6 °C)-99.2 °F (37.3 °C)] 99.2 °F (37.3 °C)  Pulse:  [] 80  Resp:  [16-50] 40  SpO2:  [97 %-100 %] 98 %  BP: ()/(0) 86/0  Arterial Line BP: ()/(54-73) 90/60     Weight: 84.6 kg (186 lb 8.2 oz) (08/28/17 0329)  Body mass index is 28.36 kg/m².  Body surface area is 2.01 meters squared.    I/O last 3 completed shifts:  In: 3865 [P.O.:600; I.V.:1383]  Out: 9575 [Urine:9575]    Physical Exam   Constitutional: He is oriented to person, place, and time. He appears well-developed and well-nourished. No distress.   HENT:   Head:  Normocephalic and atraumatic.   Eyes: EOM are normal.   Cardiovascular: Exam reveals no gallop and no friction rub.    No murmur heard.  Smooth LVAD hum   Pulmonary/Chest: Effort normal and breath sounds normal. No respiratory distress. He has no wheezes. He has no rales.   Abdominal: Soft. There is no tenderness.   Musculoskeletal: He exhibits edema (1+ lower ext.).   Neurological: He is alert and oriented to person, place, and time.   Skin: Skin is warm and dry. No rash noted. He is not diaphoretic.   Vitals reviewed.      Significant Labs:  CBC:   Recent Labs  Lab 08/28/17  0402   WBC 14.23*  14.23*   RBC 2.89*  2.89*   HGB 8.3*  8.3*   HCT 25.8*  25.8*     281   MCV 89  89   MCH 28.7  28.7   MCHC 32.2  32.2     CMP:   Recent Labs  Lab 08/28/17  0402 08/28/17  1102     102  --    CALCIUM 8.6*  8.6*  --    ALBUMIN 2.0*  2.0*  --    PROT 6.7  6.7  --    *  150*  --    K 4.0  4.0 4.4   CO2 31*  31*  --      108  --    BUN 78*  78*  --    CREATININE 1.8*  1.8*  --    ALKPHOS 163*  163*  --    ALT 22  22  --    AST 25  25  --    BILITOT 1.7*  1.7*  --             Assessment/Plan:     Hypernatremia    - 2/2 dehydration  - iatrogenic from lasix-induced polyuria  - Na continues to rise; concerned that dehydration will soon result in a pre-renal INDIRA  -3L free water deficit.  Recommend starting D5W @ 75 cc/hr x 1.5L.  Would consider discontinuing continuous lasix infusion and place on Intermittent dosing.            Alfonzo Medina, DARLING  Nephrology  [facility]  Pager: [phone]        I have reviewed and concur with the NPs history, physical, assessment, and plan. I have personally interviewed and examined the patient at bedside. See below addendum for my evaluation and additional findings

## 2017-08-28 NOTE — PLAN OF CARE
Problem: Occupational Therapy Goal  Goal: Occupational Therapy Goal  Goals to be met by:  2 weeks 8/28/17    Patient will increase functional independence with ADLs by performing:  Feeding: Independent   UE Dressing with Supervision.  LE Dressing with Supervision.  Grooming while standing with Supervision.  Toileting from toilet with Supervision for hygiene and clothing management.   Stand pivot transfers with Supervision.  Toilet transfer to toilet with Supervision.  Pt will be supervision  with LVAD yasmin't.       Outcome: Ongoing (interventions implemented as appropriate)  The above goals remain appropriate. DELMY Lucero  8/28/2017

## 2017-08-28 NOTE — PT/OT/SLP PROGRESS
Physical Therapy  Treatment    Suman Hayden   MRN: [id number]   Admitting Diagnosis: LVAD (left ventricular assist device) present    PT Received On: 08/28/17  PT Start Time: 0808     PT Stop Time: 0845    PT Total Time (min): 37 min       Billable Minutes:  Therapeutic Activity 15 min and Therapeutic Exercise 22 min    Treatment Type: Other (see comments) (partial co-treatment with OT)  PT/PTA: PT     PTA Visit Number: 0       General Precautions: Standard, aspiration, sternal, fall (LVAD)  Orthopedic Precautions: N/A   Braces:      Do you have any cultural, spiritual, Protestant conflicts, given your current situation?: none noted     Subjective:  Communicated with nurse prior to session.      Pain/Comfort  Pain Rating 1: 0/10  Pain Rating Post-Intervention 1: 0/10    Objective:   Patient found with: telemetry, arterial line, SCD, delgado catheter (LVAD, CVP, R IJ dialysis catheter.)    Functional Mobility:  Bed Mobility:   Rolling/Turning Right: Stand by assistance (pt needed increased time to complete tasks.)  Supine to Sit: Stand by Assistance    Transfers:  Sit <> Stand Assistance: Maximum Assistance  Sit <> Stand Assistive Device: No Assistive Device    Gait:   Gait Distance: 3 ft  Assistance 1: Total assistance (pt max assist x 2.)  Gait Assistive Device: Hand held assist      Therapeutic Activities and Exercises:  Pt performed AAROM BLE x 15 x 2 reps in supine.      AM-PAC 6 CLICK MOBILITY  How much help from another person does this patient currently need?   1 = Unable, Total/Dependent Assistance  2 = A lot, Maximum/Moderate Assistance  3 = A little, Minimum/Contact Guard/Supervision  4 = None, Modified [place]/Independent    Turning over in bed (including adjusting bedclothes, sheets and blankets)?: 3  Sitting down on and standing up from a chair with arms (e.g., wheelchair, bedside commode, etc.): 2  Moving from lying on back to sitting on the side of the bed?: 3  Moving to and from a bed to a chair  (including a wheelchair)?: 2  Need to walk in hospital room?: 2  Climbing 3-5 steps with a railing?: 1  Total Score: 13    AM-PAC Raw Score CMS G-Code Modifier Level of Impairment Assistance   6 % Total / Unable   7 - 9 CM 80 - 100% Maximal Assist   10 - 14 CL 60 - 80% Moderate Assist   15 - 19 CK 40 - 60% Moderate Assist   20 - 22 CJ 20 - 40% Minimal Assist   23 CI 1-20% SBA / CGA   24 CH 0% Independent/ Mod I     Patient left up in chair with all lines intact, call button in reach and wife present.    Assessment:  Suman Hayden is a 67 y.o. male with a medical diagnosis of LVAD (left ventricular assist device) present and presents with decreased strength, mobility, balance, transfers and decreased distance ambulated. Pt would benefit from cont PT to address deficits and progress pt physically. Pt will benefit from inpt rehab placement to maximize rehab. Pt is s/p  LVAD HM 3 placement 8/16, sternal washout 8/17, chest closure. 8/18. Pt was re-intubated 8/9 and extubated 8/13/17.    Rehab identified problem list/impairments: Rehab identified problem list/impairments: weakness, impaired endurance, impaired functional mobilty, gait instability, impaired balance, decreased lower extremity function    Rehab potential is good.    Activity tolerance: Good    Discharge recommendations: Discharge Facility/Level Of Care Needs: rehabilitation facility     Barriers to discharge: Barriers to Discharge: Decreased caregiver support (wife will not be able to assist pt at current functional status.)    Equipment recommendations: Equipment Needed After Discharge:  (will determine DME Needs closer to discharge.)     GOALS:    Physical Therapy Goals        Problem: Physical Therapy Goal    Goal Priority Disciplines Outcome Goal Variances Interventions   Physical Therapy Goal     PT/OT, PT Ongoing (interventions implemented as appropriate)     Description:  Goals to be met by: 9/12/17     Patient will increase functional  independence with mobility by performin. Supine to sit with MInimal Assistance-  Met 2017  2. Sit to supine with MInimal Assistance - not met  3. Sit to stand transfer with Minimal Assistance- not met  4. Bed to chair transfer with Minimal Assistance- not met  5. Gait  x 50 feet with Minimal Assistance. - not met  6. Lower extremity exercise program x15 reps, with supervision, in order to increase LE strength and (I) with functional mobility. - not met  7.Pt mod I supine to sit- not met                         PLAN:    Patient to be seen 6 x/week  to address the above listed problems via gait training, therapeutic activities, therapeutic exercises  Plan of Care expires: 17  Plan of Care reviewed with: patient, spouse         Astrid DUSTY Whaley, PT  2017

## 2017-08-28 NOTE — ASSESSMENT & PLAN NOTE
- Appropriate in the setting of VT aggravated by underlying AT/AFL (earlier during the admission). Device reprogrammed to VVI 80 this admission

## 2017-08-28 NOTE — ASSESSMENT & PLAN NOTE
- 2/2 dehydration  - iatrogenic from lasix-induced polyuria  - Na continues to rise; concerned that dehydration will soon result in a pre-renal INDIRA  -3L free water deficit.  Recommend starting D5W @ 75 cc/hr x 1.5L.  Would consider discontinuing continuous lasix infusion and place on Intermittent dosing.

## 2017-08-28 NOTE — PROGRESS NOTES
[facility]  Heart Transplant  Progress Note    Patient Name: Suman Hayden  MRN: [id number]  Admission Date: 7/18/2017  Hospital Length of Stay: 41 days  Attending Physician: Sunny Downing MD  Primary Care Provider: Joe Ernst MD  Principal Problem:LVAD (left ventricular assist device) present    Subjective:     Interval History: Pt working with OT this am. Still having issues with intermittent nausea. Not able to eat or drink much    Continuous Infusions:   Amino acid 5% - dextrose 15% (CLINIMIX-E) solution with additives (1L  provides 510 kcal/L dextrose, with 50 gm AA, 150 gm CHO, Na 35, K 30, Mg 5, Ca 4.5, Acetate 80, Cl 39, Phos 15)      DOBUTamine 5 mcg/kg/min (08/28/17 1100)    epinephrine infusion Stopped (08/15/17 1500)    furosemide (LASIX) 1 mg/mL infusion (non-titrating) 5 mg/hr (08/28/17 1200)    heparin (porcine) in 5 % dex 500 Units/hr (08/28/17 1200)    TPN ADULT CENTRAL LINE CUSTOM 40 mL/hr at 08/28/17 1200     Scheduled Meds:   acetaminophen  650 mg Oral Once    amlodipine  2.5 mg Oral Daily    ertapenem (INVANZ) IVPB  0.5 g Intravenous Q24H    fat emulsion 20%  250 mL Intravenous Daily    pantoprazole  40 mg Intravenous BID    potassium chloride  40 mEq Oral Daily    sodium chloride 0.9%  10 mL Intravenous Q6H    sodium chloride 0.9%  10 mL Intravenous Q6H    warfarin  5 mg Oral Once     PRN Meds:albumin human 5%, albuterol sulfate, bisacodyl, dextrose 50%, glucagon (human recombinant), hydrALAZINE, insulin aspart, ondansetron, Flushing PICC Protocol **AND** sodium chloride 0.9% **AND** sodium chloride 0.9%, Flushing PICC Protocol **AND** sodium chloride 0.9% **AND** sodium chloride 0.9%    Review of patient's allergies indicates:  No Known Allergies  Objective:     Vital Signs (Most Recent):  Temp: 99.2 °F (37.3 °C) (08/28/17 1100)  Pulse: 80 (08/28/17 1200)  Resp: (!) 40 (08/28/17 1200)  BP: (!) 86/0 (08/28/17 0730)  SpO2: 98 % (08/28/17 1200) Vital Signs  (24h Range):  Temp:  [97.8 °F (36.6 °C)-99.2 °F (37.3 °C)] 99.2 °F (37.3 °C)  Pulse:  [] 80  Resp:  [16-50] 40  SpO2:  [97 %-100 %] 98 %  BP: ()/(0) 86/0  Arterial Line BP: ()/(54-73) 90/60     Weight: 84.6 kg (186 lb 8.2 oz)  Body mass index is 28.36 kg/m².      Intake/Output Summary (Last 24 hours) at 08/28/17 1344  Last data filed at 08/28/17 1200   Gross per 24 hour   Intake             2406 ml   Output             5280 ml   Net            -2874 ml     Physical Exam     Constitutional: He appears well-developed and well-nourished.   HENT:   Head: Normocephalic and atraumatic.   Eyes: EOM are normal. Pupils are equal, round, and reactive to light.   Cardiovascular: Normal rate, regular rhythm and normal heart sounds.  Exam reveals no gallop and no friction rub.    No murmur heard.  + LVAD hum    Pulmonary/Chest: Effort normal. No respiratory distress. He has no wheezes. He has no rales.   Abdominal: Soft. Bowel sounds are normal.   Musculoskeletal: He exhibits no edema.   Neurological: He is alert.   Nursing note and vitals reviewed      Significant Labs:  CBC:    Recent Labs  Lab 08/28/17  0402   WBC 14.23*  14.23*   RBC 2.89*  2.89*   HGB 8.3*  8.3*   HCT 25.8*  25.8*     281   MCV 89  89   MCH 28.7  28.7   MCHC 32.2  32.2     BNP:    Recent Labs  Lab 08/28/17  0402   BNP 3,020*     CMP:    Recent Labs  Lab 08/28/17  0402 08/28/17  1102     102  --    CALCIUM 8.6*  8.6*  --    ALBUMIN 2.0*  2.0*  --    PROT 6.7  6.7  --    *  150*  --    K 4.0  4.0 4.4   CO2 31*  31*  --      108  --    BUN 78*  78*  --    CREATININE 1.8*  1.8*  --    ALKPHOS 163*  163*  --    ALT 22  22  --    AST 25  25  --    BILITOT 1.7*  1.7*  --       Coagulation:     Recent Labs  Lab 08/28/17  0402   INR 2.0*   APTT 33.5*     LDH:    Recent Labs  Lab 08/26/17  0408 08/27/17  0404 08/28/17  0402   * 373* 319*     Microbiology:  Microbiology Results (last 7 days)      ** No results found for the last 168 hours. **          Estimated Creatinine Clearance: 42.2 mL/min (based on Cr of 1.8).        Assessment and Plan:     * LVAD (left ventricular assist device) present    - LVAD HM III. Placed this admit 7/27/17  - CTS Primary  - chest closure (7/28/17) and extubated (7/29/17), reintubated 8/9/17 and extubated 8/13/17  -  @5mcg/kg/min.  - On Lasix gtt at 10, per CTS           Bacteremia    - ESBL bacteremia. Currently on ertapenem.   - ID following.         Upper GI bleed    - GI following peripherally. Hgb stable.   - Continue Protonix gtt        Atrial fibrillation    -AC, Amio per C TS          Hypernatremia    -Nephrology recommending D5W drip.         Atrial tachycardia    - [id number] - 3  -Rec restarting Amio. AC per CTS        AICD discharge    - Appropriate in the setting of VT aggravated by underlying AT/AFL (earlier during the admission). Device reprogrammed to VVI 80 this admission          Hepatitis B core antibody positive since 2012              V-tach    - Recommend resuming Amio        Hyperlipidemia    - Please resume pravastatin once liver enzymes stabilize             Susannah Elizabeth PA-C  Heart Transplant  [facility]

## 2017-08-28 NOTE — PLAN OF CARE
Problem: Patient Care Overview  Goal: Plan of Care Review  Patient's vital signs stable overnight. Patient continues on room air with SaO2 97-98%. Prn hydralazine given once per order for MAP greater than 80. Urine output 205-350/hr. Patient continues on heparin, dobutamine, lasix, TPN, and protonix drips per order. No issues with LVAD overnight. Patient turned Q2H. Patient's spouse at bedside throughout the night. Plan of care reviewed with patient and spouse. Will continue to monitor closely.

## 2017-08-28 NOTE — PROGRESS NOTES
Opens eyes spontaneously.  Following all commands, oriented x3. Moves all extremities equally.  See flowsheet for complete assessment.

## 2017-08-28 NOTE — ASSESSMENT & PLAN NOTE
- LVAD HM III. Placed this admit 7/27/17  - CTS Primary  - chest closure (7/28/17) and extubated (7/29/17), reintubated 8/9/17 and extubated 8/13/17  -  @5mcg/kg/min.  - On Lasix gtt at 10, per CTS

## 2017-08-28 NOTE — PROGRESS NOTES
Progress Note  Surgical Intensive Care    Admit Date: 7/18/2017  Post-operative Day: 11 Days Post-Op  Hospital Day: 42    SUBJECTIVE:     Follow-up For:  Procedure(s) (LRB):  ESOPHAGOGASTRODUODENOSCOPY (EGD) (N/A)    HPI:  Patient is a 67 y.o. male with NICM (EF 10%) on home dobutamine at 5 mcg/kg/hr and acute on chronic systolic and diastolic heart failure (NYHA class IV), HTN, hyperlipidemia s/p Med[employer] CRT-D. He was originally in the process of getting a VAD but was diagnosed with Hep B, for which hepatology recommended repeat viral studies/US and liver biopsy. He was admitted at [facility] on 7/18. He went to the OR on 7/27/2017 for LVAD insertion. EGD on 8/17/2017    Interval history: No acute events overnight. Hydralazine PRN for MAP>80. Urine output adequate at 205-350/hr. On heparin, dobutamine, lasix, TPN, protonix drips. One episode of emesis overnight.     Continuous Infusions:   Amino acid 5% - dextrose 15% (CLINIMIX-E) solution with additives (1L  provides 510 kcal/L dextrose, with 50 gm AA, 150 gm CHO, Na 35, K 30, Mg 5, Ca 4.5, Acetate 80, Cl 39, Phos 15)      DOBUTamine 5 mcg/kg/min (08/28/17 1000)    epinephrine infusion Stopped (08/15/17 1500)    furosemide (LASIX) 1 mg/mL infusion (non-titrating) 5 mg/hr (08/28/17 1000)    heparin (porcine) in 5 % dex 500 Units/hr (08/28/17 1000)    TPN ADULT CENTRAL LINE CUSTOM 40 mL/hr at 08/28/17 1000     Scheduled Meds:   amlodipine  2.5 mg Oral Daily    ertapenem (INVANZ) IVPB  0.5 g Intravenous Q24H    fat emulsion 20%  250 mL Intravenous Daily    pantoprazole  40 mg Intravenous BID    potassium chloride  40 mEq Oral Daily    sodium chloride 0.9%  10 mL Intravenous Q6H    sodium chloride 0.9%  10 mL Intravenous Q6H    warfarin  5 mg Oral Once     PRN Meds:albumin human 5%, albuterol sulfate, bisacodyl, dextrose 50%, glucagon (human recombinant), hydrALAZINE, insulin aspart, ondansetron, Flushing PICC Protocol **AND** sodium chloride 0.9%  **AND** sodium chloride 0.9%, Flushing PICC Protocol **AND** sodium chloride 0.9% **AND** sodium chloride 0.9%    Review of patient's allergies indicates:  No Known Allergies    OBJECTIVE:     Vital Signs (Most Recent)  Temp: 97.8 °F (36.6 °C) (08/28/17 0700)  Pulse: 85 (08/28/17 0845)  Resp: (!) 42 (08/28/17 0845)  BP: (!) 86/0 (08/28/17 0730)  SpO2: 97 % (08/28/17 0300)    Vital Signs Range (Last 24H):  Temp:  [97.8 °F (36.6 °C)-98.9 °F (37.2 °C)]   Pulse:  []   Resp:  [16-50]   BP: ()/(0)   SpO2:  [97 %-100 %]   Arterial Line BP: ()/(54-73)     I & O (Last 24H):    Intake/Output Summary (Last 24 hours) at 08/28/17 1124  Last data filed at 08/28/17 0800   Gross per 24 hour   Intake             2432 ml   Output             5485 ml   Net            -3053 ml     Ventilator Data (Last 24H):          Hemodynamic Parameters (Last 24H):       Physical Exam:  General: well developed, well nourished  Lungs:  normal respiratory effort  Cardiovascular: Heart: regular rate and rhythm, S1, S2 normal, no murmur, click, rub or gallop. Chest Wall: no tenderness. Extremities: no cyanosis or edema, or clubbing. Pulses: 2+ and symmetric.  Abdomen/Rectal: Abdomen: soft, non-tender non-distented; bowel sounds normal; no masses,  no organomegaly. Rectal: normal tone, no masses or tenderness  Skin: Skin color, texture, turgor normal. No rashes or lesions  Neurologic: Normal strength and tone. No focal numbness or weakness    Wound/Incision:  clean, dry, intact    Laboratory (Last 24H):  CBC:    Recent Labs  Lab 08/28/17  0402   WBC 14.23*  14.23*   HGB 8.3*  8.3*   HCT 25.8*  25.8*     281     CMP:    Recent Labs  Lab 08/28/17  0402   CALCIUM 8.6*  8.6*   ALBUMIN 2.0*  2.0*   PROT 6.7  6.7   *  150*   K 4.0  4.0   CO2 31*  31*     108   BUN 78*  78*   CREATININE 1.8*  1.8*   ALKPHOS 163*  163*   ALT 22  22   AST 25  25   BILITOT 1.7*  1.7*       Chest X-Ray: X-ray Chest 1  View    Result Date: 8/28/2017  No detrimental change in cardiopulmonary status from 08/27/2017.   Electronically signed by resident: COURTNEY TUTTLE MD Date: 08/28/17    Diagnostic Results:  X-Ray: Stable from yesterday    ASSESSMENT/PLAN:       Neuro:  - alert and responding to commands  - sedation off  - no c/o pain  - continue OOBTC every day      Resp:  - stable on room air  - resp cultures were ESBL+ on 8/9/2017     CV:  - HDS; LVAD numbers stable  - Dobutatmine 5, mgmt per CTS     Heme:  - Hgb/Hct 8.3/25.8  - Continue to trend labs  - INR 2.0  - Heparin 500u/hr     ID:  - afebrile  - WBC stable (14.23 from 14.24)  - bacteremic and resp cultures ESBL+, on ertapenem  - ID following  - blood cx 8/11 NGTD     Renal:  - Singer in place  - UOP 5.84L yesterday  - Strict I/Os   - Lasix drip at 10mg/hr  - BUN/Cr at baseline (78/1.8)  - hypernatremic at 150     FEN/GI:  - Currently on Protonix drip; Consider transitioning to BID  - Replace lytes PRN     Endo:  - Endocrine following  - Accuchecks  - SSI     Dispo:  - wean drips as tolerated per CTS  - plan to stepdown today    Lissette Morgan MD PGY-1  [phone]  08/28/2017

## 2017-08-28 NOTE — PT/OT/SLP PROGRESS
"Occupational Therapy  Treatment    Suman Hayden   MRN: [id number]   Admitting Diagnosis: LVAD (left ventricular assist device) present    OT Date of Treatment: 08/28/17   OT Start Time: 0833 (1041 second visit)  OT Stop Time: 0848 (1051 second visit)  OT Total Time (min): 15 min    Billable Minutes:  Therapeutic Activity 13 and Therapeutic Exercise 10    General Precautions: Standard, aspiration, fall, sternal, LVAD  Orthopedic Precautions:    Braces:           Subjective:  Communicated with RN prior to session.  "They know about it." (re: stabbing pain in L chest in area of defibrillator)  Pain/Comfort  Pain Rating 1: 0/10  Pain Rating Post-Intervention 1: 0/10    Objective:  Patient found with: arterial line, blood pressure cuff, central line, PICC line, pulse ox (continuous), telemetry     Functional Mobility:  Bed Mobility:  Rolling/Turning Right: Stand by assistance  Scooting/Bridging: Stand by Assistance  Supine to Sit: Stand by Assistance    Transfers:   Sit <> Stand Assistance: Maximum Assistance (from EOB)  Sit <> Stand Assistive Device: No Assistive Device  Bed <> Chair Technique: Stand Pivot  Bed <> Chair Transfer Assistance: Maximum Assistance  Bed <> Chair Assistive Device: No Assistive Device    Functional Ambulation: Max A x 2 with B HHA    Activities of Daily Living:  Feeding Level of Assistance: Minimum assistance  Feeding adaptive equipment:   UE Dressing Level of Assistance: Maximum assistance  UE adaptive equipment:   LE Dressing Level of Assistance: Total assistance  LE adaptive equipment:   Grooming Position: Seated  Grooming Level of Assistance: Stand by assistance     Therapeutic Activities and Exercises:  Pt participated in transfer training and functional mobility in prep for increased ADL (I)  Pt performed B UE AROM ex's in all planes and participated in B UE  strengthening ex's    AM-PAC 6 CLICK ADL   How much help from another person does this patient currently need?   1 = Unable, " "Total/Dependent Assistance  2 = A lot, Maximum/Moderate Assistance  3 = A little, Minimum/Contact Guard/Supervision  4 = None, Modified [place]/Independent    Putting on and taking off regular lower body clothing? : 1  Bathing (including washing, rinsing, drying)?: 2  Toileting, which includes using toilet, bedpan, or urinal? : 2  Putting on and taking off regular upper body clothing?: 2  Taking care of personal grooming such as brushing teeth?: 3  Eating meals?: 3  Total Score: 13     AM-PAC Raw Score CMS "G-Code Modifier Level of Impairment Assistance   6 % Total / Unable   7 - 8 CM 80 - 100% Maximal Assist   9-13 CL 60 - 80% Moderate Assist   14 - 19 CK 40 - 60% Moderate Assist   20 - 22 CJ 20 - 40% Minimal Assist   23 CI 1-20% SBA / CGA   24 CH 0% Independent/ Mod I       Patient left up in chair with all lines intact, call button in reach, rn notified and spouse present    ASSESSMENT:  Suman Hayden is a 67 y.o. male with a medical diagnosis of LVAD (left ventricular assist device) present and presents with increasing overall strength, but continues to be limited by pain and decreased endurance. Pt with notably decreased UE edema.    Rehab identified problem list/impairments: Rehab identified problem list/impairments: weakness, impaired endurance, impaired self care skills, impaired balance, gait instability, impaired functional mobilty, impaired cardiopulmonary response to activity, impaired coordination, pain, decreased upper extremity function, impaired fine motor    Rehab potential is good.    Activity tolerance: Good    Discharge recommendations: Discharge Facility/Level Of Care Needs: rehabilitation facility     Barriers to discharge: Barriers to Discharge: Decreased caregiver support (at current functional level)    Equipment recommendations:  (TBD closer to d/c)     GOALS:    Occupational Therapy Goals        Problem: Occupational Therapy Goal    Goal Priority Disciplines Outcome Interventions "   Occupational Therapy Goal     OT, PT/OT Ongoing (interventions implemented as appropriate)    Description:  Goals to be met by:  2 weeks 8/28/17    Patient will increase functional independence with ADLs by performing:  Feeding: Independent   UE Dressing with Supervision.  LE Dressing with Supervision.  Grooming while standing with Supervision.  Toileting from toilet with Supervision for hygiene and clothing management.   Stand pivot transfers with Supervision.  Toilet transfer to toilet with Supervision.  Pt will be supervision  with LVAD yasmin't.                  Multidisciplinary Problems (Resolved)        Problem: Occupational Therapy Goal    Goal Priority Disciplines Outcome Interventions   Occupational Therapy Goal   (Resolved)     OT, PT/OT  Error    Description:  Goals to be met by: 8/04/2017    Patient will increase functional independence with ADLs by performing:    Increased functional strength to 5/5 for improved ADL performance.  Upper extremity exercise program and R LE ankle pumps  3x 10 reps per handout, with independence.                      Plan:  Patient to be seen 6 x/week to address the above listed problems via self-care/home management, therapeutic activities, therapeutic exercises  Plan of Care expires: 08/29/17  Plan of Care reviewed with: patient, spouse         Sammi CASTILLO DELMY Conklin  08/28/2017

## 2017-08-28 NOTE — SUBJECTIVE & OBJECTIVE
Interval History: Pt working with OT this am. Still having issues with intermittent nausea. Not able to eat or drink much    Continuous Infusions:   Amino acid 5% - dextrose 15% (CLINIMIX-E) solution with additives (1L  provides 510 kcal/L dextrose, with 50 gm AA, 150 gm CHO, Na 35, K 30, Mg 5, Ca 4.5, Acetate 80, Cl 39, Phos 15)      DOBUTamine 5 mcg/kg/min (08/28/17 1100)    epinephrine infusion Stopped (08/15/17 1500)    furosemide (LASIX) 1 mg/mL infusion (non-titrating) 5 mg/hr (08/28/17 1200)    heparin (porcine) in 5 % dex 500 Units/hr (08/28/17 1200)    TPN ADULT CENTRAL LINE CUSTOM 40 mL/hr at 08/28/17 1200     Scheduled Meds:   acetaminophen  650 mg Oral Once    amlodipine  2.5 mg Oral Daily    ertapenem (INVANZ) IVPB  0.5 g Intravenous Q24H    fat emulsion 20%  250 mL Intravenous Daily    pantoprazole  40 mg Intravenous BID    potassium chloride  40 mEq Oral Daily    sodium chloride 0.9%  10 mL Intravenous Q6H    sodium chloride 0.9%  10 mL Intravenous Q6H    warfarin  5 mg Oral Once     PRN Meds:albumin human 5%, albuterol sulfate, bisacodyl, dextrose 50%, glucagon (human recombinant), hydrALAZINE, insulin aspart, ondansetron, Flushing PICC Protocol **AND** sodium chloride 0.9% **AND** sodium chloride 0.9%, Flushing PICC Protocol **AND** sodium chloride 0.9% **AND** sodium chloride 0.9%    Review of patient's allergies indicates:  No Known Allergies  Objective:     Vital Signs (Most Recent):  Temp: 99.2 °F (37.3 °C) (08/28/17 1100)  Pulse: 80 (08/28/17 1200)  Resp: (!) 40 (08/28/17 1200)  BP: (!) 86/0 (08/28/17 0730)  SpO2: 98 % (08/28/17 1200) Vital Signs (24h Range):  Temp:  [97.8 °F (36.6 °C)-99.2 °F (37.3 °C)] 99.2 °F (37.3 °C)  Pulse:  [] 80  Resp:  [16-50] 40  SpO2:  [97 %-100 %] 98 %  BP: ()/(0) 86/0  Arterial Line BP: ()/(54-73) 90/60     Weight: 84.6 kg (186 lb 8.2 oz)  Body mass index is 28.36 kg/m².      Intake/Output Summary (Last 24 hours) at 08/28/17  1344  Last data filed at 08/28/17 1200   Gross per 24 hour   Intake             2406 ml   Output             5280 ml   Net            -2874 ml     Physical Exam     Constitutional: He appears well-developed and well-nourished.   HENT:   Head: Normocephalic and atraumatic.   Eyes: EOM are normal. Pupils are equal, round, and reactive to light.   Cardiovascular: Normal rate, regular rhythm and normal heart sounds.  Exam reveals no gallop and no friction rub.    No murmur heard.  + LVAD hum    Pulmonary/Chest: Effort normal. No respiratory distress. He has no wheezes. He has no rales.   Abdominal: Soft. Bowel sounds are normal.   Musculoskeletal: He exhibits no edema.   Neurological: He is alert.   Nursing note and vitals reviewed      Significant Labs:  CBC:    Recent Labs  Lab 08/28/17 0402   WBC 14.23*  14.23*   RBC 2.89*  2.89*   HGB 8.3*  8.3*   HCT 25.8*  25.8*     281   MCV 89  89   MCH 28.7  28.7   MCHC 32.2  32.2     BNP:    Recent Labs  Lab 08/28/17 0402   BNP 3,020*     CMP:    Recent Labs  Lab 08/28/17  0402 08/28/17  1102     102  --    CALCIUM 8.6*  8.6*  --    ALBUMIN 2.0*  2.0*  --    PROT 6.7  6.7  --    *  150*  --    K 4.0  4.0 4.4   CO2 31*  31*  --      108  --    BUN 78*  78*  --    CREATININE 1.8*  1.8*  --    ALKPHOS 163*  163*  --    ALT 22  22  --    AST 25  25  --    BILITOT 1.7*  1.7*  --       Coagulation:     Recent Labs  Lab 08/28/17 0402   INR 2.0*   APTT 33.5*     LDH:    Recent Labs  Lab 08/26/17 0408 08/27/17 0404 08/28/17 0402   * 373* 319*     Microbiology:  Microbiology Results (last 7 days)     ** No results found for the last 168 hours. **          Estimated Creatinine Clearance: 42.2 mL/min (based on Cr of 1.8).

## 2017-08-28 NOTE — PT/OT/SLP PROGRESS
Speech Language Pathology      Suman Hayden  MRN: [id number]  [id number] A     Patient not seen today secondary to Patient ill (Comment) (nausea and vomiting this morning; refusing po trials ). Will follow-up per plan of care.    SHERRI Hart, CCC-SLP, [facility], 8/28/2017

## 2017-08-28 NOTE — PLAN OF CARE
Problem: Physical Therapy Goal  Goal: Physical Therapy Goal  Goals to be met by: 17     Patient will increase functional independence with mobility by performin. Supine to sit with MInimal Assistance-  Met 2017  2. Sit to supine with MInimal Assistance - not met  3. Sit to stand transfer with Minimal Assistance- not met  4. Bed to chair transfer with Minimal Assistance- not met  5. Gait  x 50 feet with Minimal Assistance. - not met  6. Lower extremity exercise program x15 reps, with supervision, in order to increase LE strength and (I) with functional mobility. - not met  7.Pt mod I supine to sit- not met       Goals updated and are appropriate. Astrid Whaley, PT 2017    '

## 2017-08-28 NOTE — PROGRESS NOTES
Daily E and M and VAD Interrogation Note    Reason for Visit:  Patient is seen in follow up for management of:  [] HeartMate II  [] Heartware [] Total artificial heart       [] ECMO           [x] Other - HM III     Interval History:  [] Interval history unobtainable due to intubation.  The [x] implant/[] explant date was 7/27/17    Events -  NAEON.  Good UOP and BUN/Cr improving.  Appetite improving        Review of Systems:   Negative except as above.      Medications:  Current Facility-Administered Medications   Medication Dose Route Frequency Provider Last Rate Last Dose    albumin human 5% bottle 500 mL  500 mL Intravenous PRN Shayne Espinoza MD   500 mL at 08/09/17 0700    albuterol nebulizer solution 2.5 mg  2.5 mg Nebulization Q4H PRN Shayne Espinoza MD        Amino acid 5% - dextrose 15% (CLINIMIX-E) solution with additives.  CENTRAL LINE ONLY (1395 mOsm/L). 1L provides 50 gm AA, 150 gm CHO (510 kcal/L dextrose), Na 35, K 30, Mg 5, Ca 4.5, Acetate 80, Cl 39, Phos 15.   Intravenous Continuous Andres Uriarte MD        amlodipine tablet 2.5 mg  2.5 mg Oral Daily Andres Uriarte MD   2.5 mg at 08/28/17 0803    bisacodyl suppository 10 mg  10 mg Rectal Daily PRN Shayne Espinoza MD   10 mg at 08/06/17 2036    dextrose 50% injection 12.5 g  12.5 g Intravenous PRN Charbel Ritter MD   12.5 g at 08/25/17 1154    DOBUtamine 1000 mg in D5W 250 mL infusion (premix non-titrating)  5 mcg/kg/min (Dosing Weight) Intravenous Continuous Sunny Downing MD 6 mL/hr at 08/28/17 1000 5 mcg/kg/min at 08/28/17 1000    EPINEPHrine (ADRENALIN) 4 mg in sodium chloride 0.9% 250 mL infusion  0.01 mcg/kg/min (Dosing Weight) Intravenous Continuous Shayne Espinoza MD   Stopped at 08/15/17 1500    ertapenem (INVANZ) 0.5 g in sodium chloride 0.9% 50 mL IVPB  0.5 g Intravenous Q24H Angie Torres  mL/hr at 08/27/17 2003 0.5 g at 08/27/17 2003    fat emulsion 20% infusion 250 mL  250 mL Intravenous  Daily Andres Uriarte MD        furosemide (LASIX) 250 mg in sodium chloride 0.9 % 250 mL infusion (non-titrating)  5 mg/hr Intravenous Continuous Sunny Downing MD 5 mL/hr at 08/28/17 1000 5 mg/hr at 08/28/17 1000    glucagon (human recombinant) injection 1 mg  1 mg Intramuscular PRN Charbel Ritter MD        heparin 25,000 units in dextrose 5% 250 mL (100 units/mL) infusion (heparin infusion)  500 Units/hr Intravenous Continuous Andres Uriarte MD 5 mL/hr at 08/28/17 1000 500 Units/hr at 08/28/17 1000    hydrALAZINE injection 10 mg  10 mg Intravenous Q6H PRN Shlomo Serrato MD   10 mg at 08/28/17 0053    insulin aspart pen 0-5 Units  0-5 Units Subcutaneous Q4H PRN Charbel Ritter MD   2 Units at 08/10/17 0751    ondansetron injection 4 mg  4 mg Intravenous Q6H PRN Shayne Espinoza MD   4 mg at 08/28/17 0743    ondansetron injection 4 mg  4 mg Intravenous Once Andres Uriarte MD        pantoprazole injection 40 mg  40 mg Intravenous BID Nadia Lucia, NP        potassium chloride SA CR tablet 40 mEq  40 mEq Oral Daily Andres Uriarte MD   40 mEq at 08/28/17 0803    sodium chloride 0.9% flush 10 mL  10 mL Intravenous Q6H Sunny Downing MD   10 mL at 08/28/17 0600    And    sodium chloride 0.9% flush 10 mL  10 mL Intravenous PRN Sunny Downing MD   10 mL at 08/10/17 1151    sodium chloride 0.9% flush 10 mL  10 mL Intravenous Q6H Sunny Downing MD   10 mL at 08/28/17 0600    And    sodium chloride 0.9% flush 10 mL  10 mL Intravenous PRN Sunny Downing MD        TPN ADULT CENTRAL LINE CUSTOM   Intravenous Continuous Andres Uriarte MD 40 mL/hr at 08/28/17 1000      warfarin (COUMADIN) tablet 5 mg  5 mg Oral Once Andres Uriarte MD         Physical Examination:  Vital Signs:   Vitals:    08/28/17 0845   BP:    Pulse: 85   Resp: (!) 42   Temp:      Cardiovascular:  [x] Regular rate and rhythm [] Irregular []  None (MATT) []  Other  []  No edema [x]  Edema  present  [x]  Clear to auscultation  []  Rales to []  Coarse  []  No rales but   [] Pedal Pulses absent  [x]  Pulses  + throughout  Skin:  Incision is [x]  Clean, dry and intact.  []  Other   Sternum:  [x]  Stable []  Unstable  Driveline(s):   [x]  Clean, dry and intact. []  Other     Labs:  BMP  Lab Results   Component Value Date     (H) 08/28/2017     (H) 08/28/2017    K 4.0 08/28/2017    K 4.0 08/28/2017     08/28/2017     08/28/2017    CO2 31 (H) 08/28/2017    CO2 31 (H) 08/28/2017    BUN 78 (H) 08/28/2017    BUN 78 (H) 08/28/2017    CREATININE 1.8 (H) 08/28/2017    CREATININE 1.8 (H) 08/28/2017    CALCIUM 8.6 (L) 08/28/2017    CALCIUM 8.6 (L) 08/28/2017    ANIONGAP 11 08/28/2017    ANIONGAP 11 08/28/2017    ESTGFRAFRICA 44.0 (A) 08/28/2017    ESTGFRAFRICA 44.0 (A) 08/28/2017    EGFRNONAA 38.1 (A) 08/28/2017    EGFRNONAA 38.1 (A) 08/28/2017     Magnesium   Date Value Ref Range Status   08/28/2017 1.8 1.6 - 2.6 mg/dL Final     Lab Results   Component Value Date    WBC 14.23 (H) 08/28/2017    WBC 14.23 (H) 08/28/2017    HGB 8.3 (L) 08/28/2017    HGB 8.3 (L) 08/28/2017    HCT 25.8 (L) 08/28/2017    HCT 25.8 (L) 08/28/2017    MCV 89 08/28/2017    MCV 89 08/28/2017     08/28/2017     08/28/2017     Lab Results   Component Value Date    INR 2.0 (H) 08/28/2017    INR 1.7 (H) 08/27/2017    INR 1.5 (H) 08/26/2017     BNP   Date Value Ref Range Status   08/28/2017 3,020 (H) 0 - 99 pg/mL Final     Comment:     Values of less than 100 pg/ml are consistent with non-CHF populations.   08/25/2017 3,601 (H) 0 - 99 pg/mL Final     Comment:     Values of less than 100 pg/ml are consistent with non-CHF populations.   08/23/2017 3,515 (H) 0 - 99 pg/mL Final     Comment:     Values of less than 100 pg/ml are consistent with non-CHF populations.     LD   Date Value Ref Range Status   08/28/2017 319 (H) 110 - 260 U/L Final     Comment:     Results are increased in hemolyzed samples.   08/27/2017  373 (H) 110 - 260 U/L Final     Comment:     Results are increased in hemolyzed samples.   08/26/2017 301 (H) 110 - 260 U/L Final     Comment:     Results are increased in hemolyzed samples.     X-Rays:  [x]  I reviewed today's Chest x-ray    Procedure:  Device Interrogation including analysis of device parameters.  Current Settings   [x]  Ventricular Assist Device  []  Total Artificial Heart interrogated  Review of device function is [x]  Stable []  Other   TXP LVAD INTERROGATIONS 8/28/2017 8/28/2017 8/28/2017 8/28/2017 8/28/2017 8/28/2017 8/28/2017   Type HeartMate3 HeartMate3 HeartMate3 HeartMate3 HeartMate3 HeartMate3 HeartMate3   Flow 4.2 4.2 4.1 4.5 4.4 4.4 4.2   Speed 5200 5200 5200 5200 5200 5200 5200   PI 3.4 3.5 4.1 3.5 3.6 3.5 3.9   Power ([place]) 3.6 3.6 3.6 3.6 3.6 3.6 3.6   LSL 4800 - - - 4800 4800 4800   Pulsatility - - - - - - -   Some recent data might be hidden       Assessment:  [x]  Primary Cardiomyopathy [x]  Congestive Heart Failure   []  Atrial Fibrillation []  Ventricular Tachycardia   [x]  Aftercare cardiac device [x]  Long term (current) use of anticoagulants   []  Ventilator-associated pneumonia []  Pneumonia viral, unspecified   []  Pneumonia, bacterial, unspecified []  Pneumonia, organism unspecified   [x]  Hemorrhage of GI tract, unspecified    []  Nosebleed []  Driveline infection   []  Infection VAD device []  New onset of    []        Plan:  [x]  Interval history obtained from ICU attending team member during rounding today  [x]  VAD/MATT teaching performed with patient  [x]  Mobilization / Physical Therapy ongoing  [x]  Anticoagulation [x]  Ongoing []  Held  []  Studies ordered  []   To OR today for closure.       Total time spent was 30 minutes.  Of which more than 50 percent of the care dominated counseling and coordinating care with different team members. The VAD was interrogated and all parameters were WNL and no significant findings were found in the history. All these findings  are documented in the note above.    Neuro:  - Alert and oriented    Resp:  - Extubated  - Resp cultures with ESBL - Ertapenem per ID   - Minimize supplemental O2  - Pulmonary toilet    CV:  - HDS; LVAD numbers stable  -   - Dobutamine at 2.5  - Dopamine at 3  - Norvasc   - LVAD numbers stable      Heme:  - Hgb/Hct stable  - INR 2.0 this AM   - PICC in place  - Coumadin - 5mg   - Heparin 500/hr    ID:  - afebrile  - Ertapenem started for ESBL pos resp culture  - WBC 14  - PICC replaced 8/17   - Appreciate ID recs  - continue to monitor     Renal:  - D/C delgado today   - Strict I/Os   - Excellent UOP yesterday   - Lasix gtt decreased to 5  - BUN/Cr improving   - Nephro is following.      FEN/GI:  - Cardiac diet    - Protonix gtt off   - Half TPN    Endo:  - Endocrine following, appreciate assistance  - Accuchecks  - Insulin ssi    Dispo:  - Stepdown today     Date of service:  08/28/2017     Andres Uriarte MD     Cardiac Surgery Attending E and M (VAD) Note along with VAD Interrogation    I have seen and examined the patient and agree with the findings above    I also reviewed the patients clinical course and:  [x]  Hemodynamic & Respiratory paramters  [x]  Laboratory Data  [x]  Radiological studies     VAD Interrogated [x]      VAD Function is normal. Changes made []  None [x]        Interrogation of Ventricular assist device was performed with physician analysis of device parameters and review of device function. I have personally reviewed the interrogation findings and agree with findings as stated

## 2017-08-29 LAB
ANION GAP SERPL CALC-SCNC: 11 MMOL/L
APTT BLDCRRT: 32.4 SEC
BASOPHILS # BLD AUTO: 0.08 K/UL
BASOPHILS NFR BLD: 0.5 %
BUN SERPL-MCNC: 73 MG/DL
CALCIUM SERPL-MCNC: 8.5 MG/DL
CHLORIDE SERPL-SCNC: 107 MMOL/L
CO2 SERPL-SCNC: 33 MMOL/L
CREAT SERPL-MCNC: 1.8 MG/DL
DIFFERENTIAL METHOD: ABNORMAL
EOSINOPHIL # BLD AUTO: 0.6 K/UL
EOSINOPHIL NFR BLD: 3.5 %
ERYTHROCYTE [DISTWIDTH] IN BLOOD BY AUTOMATED COUNT: 17.6 %
EST. GFR  (AFRICAN AMERICAN): 44 ML/MIN/1.73 M^2
EST. GFR  (NON AFRICAN AMERICAN): 38.1 ML/MIN/1.73 M^2
GLUCOSE SERPL-MCNC: 71 MG/DL
HCT VFR BLD AUTO: 25.3 %
HGB BLD-MCNC: 7.9 G/DL
INR PPP: 2.4
LDH SERPL L TO P-CCNC: 311 U/L
LYMPHOCYTES # BLD AUTO: 2.4 K/UL
LYMPHOCYTES NFR BLD: 14.8 %
MAGNESIUM SERPL-MCNC: 1.7 MG/DL
MAGNESIUM SERPL-MCNC: 1.7 MG/DL
MAGNESIUM SERPL-MCNC: 1.9 MG/DL
MCH RBC QN AUTO: 27.9 PG
MCHC RBC AUTO-ENTMCNC: 31.2 G/DL
MCV RBC AUTO: 89 FL
MONOCYTES # BLD AUTO: 0.8 K/UL
MONOCYTES NFR BLD: 4.8 %
NEUTROPHILS # BLD AUTO: 12.4 K/UL
NEUTROPHILS NFR BLD: 76.1 %
PHOSPHATE SERPL-MCNC: 3.7 MG/DL
PHOSPHATE SERPL-MCNC: 3.7 MG/DL
PHOSPHATE SERPL-MCNC: 3.9 MG/DL
PLATELET # BLD AUTO: 274 K/UL
PMV BLD AUTO: 11.9 FL
POCT GLUCOSE: 106 MG/DL (ref 70–110)
POCT GLUCOSE: 56 MG/DL (ref 70–110)
POCT GLUCOSE: 75 MG/DL (ref 70–110)
POCT GLUCOSE: 79 MG/DL (ref 70–110)
POCT GLUCOSE: 83 MG/DL (ref 70–110)
POCT GLUCOSE: 91 MG/DL (ref 70–110)
POCT GLUCOSE: 92 MG/DL (ref 70–110)
POTASSIUM SERPL-SCNC: 4 MMOL/L
POTASSIUM SERPL-SCNC: 4 MMOL/L
POTASSIUM SERPL-SCNC: 4.2 MMOL/L
PROTHROMBIN TIME: 23.7 SEC
RBC # BLD AUTO: 2.83 M/UL
SODIUM SERPL-SCNC: 151 MMOL/L
WBC # BLD AUTO: 16.3 K/UL

## 2017-08-29 PROCEDURE — 27000248 HC VAD-ADDITIONAL DAY

## 2017-08-29 PROCEDURE — 93750 INTERROGATION VAD IN PERSON: CPT | Mod: ,,, | Performed by: THORACIC SURGERY (CARDIOTHORACIC VASCULAR SURGERY)

## 2017-08-29 PROCEDURE — 97803 MED NUTRITION INDIV SUBSEQ: CPT | Performed by: DIETITIAN, REGISTERED

## 2017-08-29 PROCEDURE — 84100 ASSAY OF PHOSPHORUS: CPT | Mod: 91

## 2017-08-29 PROCEDURE — 20600001 HC STEP DOWN PRIVATE ROOM

## 2017-08-29 PROCEDURE — 84132 ASSAY OF SERUM POTASSIUM: CPT

## 2017-08-29 PROCEDURE — 83735 ASSAY OF MAGNESIUM: CPT | Mod: 91

## 2017-08-29 PROCEDURE — 85025 COMPLETE CBC W/AUTO DIFF WBC: CPT

## 2017-08-29 PROCEDURE — A4216 STERILE WATER/SALINE, 10 ML: HCPCS | Performed by: THORACIC SURGERY (CARDIOTHORACIC VASCULAR SURGERY)

## 2017-08-29 PROCEDURE — 85610 PROTHROMBIN TIME: CPT

## 2017-08-29 PROCEDURE — 63600175 PHARM REV CODE 636 W HCPCS: Performed by: PHYSICIAN ASSISTANT

## 2017-08-29 PROCEDURE — 25000003 PHARM REV CODE 250: Performed by: NURSE PRACTITIONER

## 2017-08-29 PROCEDURE — 97535 SELF CARE MNGMENT TRAINING: CPT

## 2017-08-29 PROCEDURE — 93750 INTERROGATION VAD IN PERSON: CPT | Performed by: STUDENT IN AN ORGANIZED HEALTH CARE EDUCATION/TRAINING PROGRAM

## 2017-08-29 PROCEDURE — 87040 BLOOD CULTURE FOR BACTERIA: CPT

## 2017-08-29 PROCEDURE — 85730 THROMBOPLASTIN TIME PARTIAL: CPT

## 2017-08-29 PROCEDURE — 80048 BASIC METABOLIC PNL TOTAL CA: CPT

## 2017-08-29 PROCEDURE — 25000003 PHARM REV CODE 250: Performed by: THORACIC SURGERY (CARDIOTHORACIC VASCULAR SURGERY)

## 2017-08-29 PROCEDURE — C9113 INJ PANTOPRAZOLE SODIUM, VIA: HCPCS | Performed by: NURSE PRACTITIONER

## 2017-08-29 PROCEDURE — 99232 SBSQ HOSP IP/OBS MODERATE 35: CPT | Mod: ,,, | Performed by: INTERNAL MEDICINE

## 2017-08-29 PROCEDURE — 97116 GAIT TRAINING THERAPY: CPT

## 2017-08-29 PROCEDURE — 25000003 PHARM REV CODE 250: Performed by: STUDENT IN AN ORGANIZED HEALTH CARE EDUCATION/TRAINING PROGRAM

## 2017-08-29 PROCEDURE — 83615 LACTATE (LD) (LDH) ENZYME: CPT

## 2017-08-29 PROCEDURE — 63600175 PHARM REV CODE 636 W HCPCS: Performed by: NURSE PRACTITIONER

## 2017-08-29 PROCEDURE — 99233 SBSQ HOSP IP/OBS HIGH 50: CPT | Mod: 24,,, | Performed by: THORACIC SURGERY (CARDIOTHORACIC VASCULAR SURGERY)

## 2017-08-29 PROCEDURE — 93750 INTERROGATION VAD IN PERSON: CPT | Mod: ,,, | Performed by: INTERNAL MEDICINE

## 2017-08-29 PROCEDURE — 63600175 PHARM REV CODE 636 W HCPCS: Performed by: GENERAL PRACTICE

## 2017-08-29 PROCEDURE — 84100 ASSAY OF PHOSPHORUS: CPT

## 2017-08-29 PROCEDURE — 25000003 PHARM REV CODE 250: Performed by: ANESTHESIOLOGY

## 2017-08-29 PROCEDURE — 83735 ASSAY OF MAGNESIUM: CPT

## 2017-08-29 RX ORDER — WARFARIN SODIUM 5 MG/1
5 TABLET ORAL DAILY
Status: DISCONTINUED | OUTPATIENT
Start: 2017-08-29 | End: 2017-08-30

## 2017-08-29 RX ADMIN — HYDRALAZINE HYDROCHLORIDE 10 MG: 20 INJECTION INTRAMUSCULAR; INTRAVENOUS at 12:08

## 2017-08-29 RX ADMIN — WARFARIN SODIUM 5 MG: 5 TABLET ORAL at 06:08

## 2017-08-29 RX ADMIN — AMLODIPINE BESYLATE 2.5 MG: 2.5 TABLET ORAL at 10:08

## 2017-08-29 RX ADMIN — Medication 10 ML: at 12:08

## 2017-08-29 RX ADMIN — PANTOPRAZOLE SODIUM 40 MG: 40 INJECTION, POWDER, FOR SOLUTION INTRAVENOUS at 10:08

## 2017-08-29 RX ADMIN — Medication 10 ML: at 09:08

## 2017-08-29 RX ADMIN — Medication 10 ML: at 05:08

## 2017-08-29 RX ADMIN — PANTOPRAZOLE SODIUM 40 MG: 40 INJECTION, POWDER, FOR SOLUTION INTRAVENOUS at 09:08

## 2017-08-29 RX ADMIN — ERTAPENEM SODIUM 1 G: 1 INJECTION, POWDER, LYOPHILIZED, FOR SOLUTION INTRAMUSCULAR; INTRAVENOUS at 09:08

## 2017-08-29 NOTE — PROGRESS NOTES
[facility]  Heart Transplant  Progress Note    Patient Name: Suman Hayden  MRN: [id number]  Admission Date: 7/18/2017  Hospital Length of Stay: 42 days  Attending Physician: Sunny Downing MD  Primary Care Provider: Joe Ernst MD  Principal Problem:LVAD (left ventricular assist device) present    Subjective:     Interval History: Pt up in chair this am. No complaints    Continuous Infusions:   Amino acid 5% - dextrose 15% (CLINIMIX-E) solution with additives (1L  provides 510 kcal/L dextrose, with 50 gm AA, 150 gm CHO, Na 35, K 30, Mg 5, Ca 4.5, Acetate 80, Cl 39, Phos 15) 20 mL/hr at 08/28/17 2200    DOBUTamine 5 mcg/kg/min (08/28/17 2200)     Scheduled Meds:   acetaminophen  650 mg Oral Once    amlodipine  2.5 mg Oral Daily    ertapenem (INVANZ) IVPB  1 g Intravenous Q24H    pantoprazole  40 mg Intravenous BID    sodium chloride 0.9%  10 mL Intravenous Q6H    sodium chloride 0.9%  10 mL Intravenous Q6H    warfarin  5 mg Oral Daily     PRN Meds:albumin human 5%, albuterol sulfate, bisacodyl, dextrose 50%, glucagon (human recombinant), hydrALAZINE, insulin aspart, ondansetron, [CANCELED] Flushing PICC Protocol **AND** sodium chloride 0.9% **AND** sodium chloride 0.9%, Flushing PICC Protocol **AND** sodium chloride 0.9% **AND** sodium chloride 0.9%    Review of patient's allergies indicates:  No Known Allergies  Objective:     Vital Signs (Most Recent):  Temp: 98 °F (36.7 °C) (08/29/17 0815)  Pulse: 80 (08/29/17 1100)  Resp: 20 (08/29/17 0815)  BP: (!) 72/0 (08/29/17 1200)  SpO2: 97 % (08/29/17 0815) Vital Signs (24h Range):  Temp:  [97.4 °F (36.3 °C)-98.3 °F (36.8 °C)] 98 °F (36.7 °C)  Pulse:  [] 80  Resp:  [20-39] 20  SpO2:  [81 %-99 %] 97 %  BP: ()/(0-78) 72/0  Arterial Line BP: ()/(55-70) 99/69     Weight: 79.5 kg (175 lb 3.2 oz)  Body mass index is 26.64 kg/m².      Intake/Output Summary (Last 24 hours) at 08/29/17 1407  Last data filed at 08/29/17 0600   Gross  per 24 hour   Intake          1620.94 ml   Output             2395 ml   Net          -774.06 ml     Physical Exam     Constitutional: He appears well-developed and well-nourished.   HENT:   Head: Normocephalic and atraumatic.   Eyes: EOM are normal. Pupils are equal, round, and reactive to light.   Cardiovascular: Normal rate, regular rhythm and normal heart sounds.  Exam reveals no gallop and no friction rub.    No murmur heard.  + LVAD hum    Pulmonary/Chest: Effort normal. No respiratory distress. He has no wheezes. He has no rales.   Abdominal: Soft. Bowel sounds are normal.   Musculoskeletal: He exhibits no edema.   Neurological: He is alert.   Nursing note and vitals reviewed      Significant Labs:  CBC:    Recent Labs  Lab 08/29/17 0520   WBC 16.30*   RBC 2.83*   HGB 7.9*   HCT 25.3*      MCV 89   MCH 27.9   MCHC 31.2*     BNP:    Recent Labs  Lab 08/28/17 0402   BNP 3,020*     CMP:    Recent Labs  Lab 08/28/17 0402 08/29/17 0520     102  --  71   CALCIUM 8.6*  8.6*  --  8.5*   ALBUMIN 2.0*  2.0*  --   --    PROT 6.7  6.7  --   --    *  150*  --  151*   K 4.0  4.0  < > 4.0  4.0   CO2 31*  31*  --  33*     108  --  107   BUN 78*  78*  --  73*   CREATININE 1.8*  1.8*  --  1.8*   ALKPHOS 163*  163*  --   --    ALT 22  22  --   --    AST 25  25  --   --    BILITOT 1.7*  1.7*  --   --    < > = values in this interval not displayed.   Coagulation:     Recent Labs  Lab 08/29/17 0520   INR 2.4*   APTT 32.4*     LDH:    Recent Labs  Lab 08/27/17 0404 08/28/17 0402 08/29/17  0520   * 319* 311*     Microbiology:  Microbiology Results (last 7 days)     Procedure Component Value Units Date/Time    Blood culture [[id number]] Collected:  08/29/17 0836    Order Status:  Sent Specimen:  Blood from Line, Jugular, Internal Right Updated:  08/29/17 0904          Estimated Creatinine Clearance: 38.5 mL/min (based on Cr of 1.8).        Assessment and Plan:     * LVAD (left  ventricular assist device) present    - LVAD HM III. Placed this admit 7/27/17  - CTS Primary  - chest closure (7/28/17) and extubated (7/29/17), reintubated 8/9/17 and extubated 8/13/17  -  @5mcg/kg/min.  - On Lasix gtt at 5, per CTS           Bacteremia    - ESBL bacteremia. Currently on ertapenem- increase dose today due to kidney function improving.   - ID following.         Upper GI bleed    - GI following peripherally. Hgb stable.   - Continue Protonix gtt        Atrial fibrillation    -AC, Amio per C TS          Hypernatremia    -Nephrology recommending D5W drip.         Atrial tachycardia    - [id number] - 3  -Rec restarting Amio. AC per CTS        AICD discharge    - Appropriate in the setting of VT aggravated by underlying AT/AFL (earlier during the admission). Device reprogrammed to VVI 80 this admission          Hepatitis B core antibody positive since 2012              V-tach    - Recommend resuming Amio        Hyperlipidemia    - Please resume pravastatin once liver enzymes stabilize             Susannah Elizabeth PA-C  Heart Transplant  [facility]

## 2017-08-29 NOTE — ASSESSMENT & PLAN NOTE
- 2/2 dehydration  - iatrogenic from lasix-induced polyuria   - Na continues to rise; concerned that dehydration will soon result in a pre-renal INDIRA  -3L free water deficit.  Recommend starting D5W @ 75 cc/hr x 1.5L as patient unlikely to correct himself with oral fluids alone.    -kidney function has returned to baseline, will sign-off at this time.  Please re-consult as needed.

## 2017-08-29 NOTE — PROGRESS NOTES
Pt about to be transferred to CTSU, right wrist arterial line removed per order, pressure held for 15 mins, no bleeding noted, no hematoma, vaseline gauze, tegaderm and koband placed.

## 2017-08-29 NOTE — PROGRESS NOTES
Daily E and M and VAD Interrogation Note    Reason for Visit:  Patient is seen in follow up for management        [] ECMO           [x] Other - HM III                          Interval History:  [] Interval history unobtainable due to intubation.  The [x] implant/[] explant date was 7/27/17     Events -  NAEON.  Good UOP and BUN/Cr improving.  Appetite improving.  Drinking boost without issues.       Review of Systems:   Negative except as above.     Medications:  Current Facility-Administered Medications   Medication Dose Route Frequency Provider Last Rate Last Dose    acetaminophen tablet 650 mg  650 mg Oral Once Andres Uriarte MD        albumin human 5% bottle 500 mL  500 mL Intravenous PRN Shayne Espinoza MD   500 mL at 08/09/17 0700    albuterol nebulizer solution 2.5 mg  2.5 mg Nebulization Q4H PRN Shayne Espinoza MD        Amino acid 5% - dextrose 15% (CLINIMIX-E) solution with additives.  CENTRAL LINE ONLY (1395 mOsm/L). 1L provides 50 gm AA, 150 gm CHO (510 kcal/L dextrose), Na 35, K 30, Mg 5, Ca 4.5, Acetate 80, Cl 39, Phos 15.   Intravenous Continuous Andres Uriarte MD 20 mL/hr at 08/28/17 2200      amlodipine tablet 2.5 mg  2.5 mg Oral Daily Andres Uriarte MD   2.5 mg at 08/29/17 1028    bisacodyl suppository 10 mg  10 mg Rectal Daily PRN Shayne Espinoza MD   10 mg at 08/06/17 2036    dextrose 50% injection 12.5 g  12.5 g Intravenous PRN Charbel Ritter MD   12.5 g at 08/25/17 1154    DOBUtamine 1000 mg in D5W 250 mL infusion (premix non-titrating)  5 mcg/kg/min (Dosing Weight) Intravenous Continuous Sunny Downing MD 6 mL/hr at 08/28/17 2200 5 mcg/kg/min at 08/28/17 2200    ertapenem (INVANZ) 1 g in sodium chloride 0.9 % 100 mL IVPB (ready to mix system)  1 g Intravenous Q24H Susannah Elizabeth PA-C        glucagon (human recombinant) injection 1 mg  1 mg Intramuscular PRN Charbel Ritter MD        hydrALAZINE injection 10 mg  10 mg Intravenous Q6H PRN  Shlomo Serrato MD   10 mg at 08/29/17 0022    insulin aspart pen 0-5 Units  0-5 Units Subcutaneous Q4H PRN Charbel Ritter MD   2 Units at 08/10/17 0751    ondansetron injection 4 mg  4 mg Intravenous Q6H PRN Shayne Espinoza MD   4 mg at 08/28/17 0743    pantoprazole injection 40 mg  40 mg Intravenous BID Nadia Lucia NP   40 mg at 08/29/17 1028    sodium chloride 0.9% flush 10 mL  10 mL Intravenous Q6H Sunny Downing MD   10 mL at 08/28/17 1800    And    sodium chloride 0.9% flush 10 mL  10 mL Intravenous PRN Sunny Downing MD   10 mL at 08/10/17 1151    sodium chloride 0.9% flush 10 mL  10 mL Intravenous Q6H Sunny Downing MD   10 mL at 08/29/17 0516    And    sodium chloride 0.9% flush 10 mL  10 mL Intravenous PRN Sunny Downing MD        warfarin (COUMADIN) tablet 5 mg  5 mg Oral Daily Nadia Lucia NP           Physical Examination:  Vital Signs:   Vitals:    08/29/17 1645   BP: (!) 74/0   Pulse:    Resp:    Temp:      Cardiovascular:  [x] Regular rate and rhythm []  Other  [x]  No edema []  Edema present  [x]  Clear to auscultation  VAD sounds smooth  Skin:  Incision is [x]  Clean, dry and intact.    Sternum:  [x]  Stable []  Unstable  Driveline(s):   [x]  Clean, dry and intact.       Labs:  CBC, BMP, LDH, INR      Procedure:  Device Interrogation including analysis of device parameters.  Current Settings   [x]  Ventricular Assist Device  []  Total Artificial Heart interrogated  Review of device function is [x]  Stable     TXP LVAD INTERROGATIONS 8/29/2017 8/29/2017 8/29/2017 8/29/2017 8/28/2017 8/28/2017 8/28/2017   Type HeartMate II;HeartMate3 HeartMate3 HeartMate3 HeartMate3 HeartMate3 HeartMate3 HeartMate3   Flow 4.2 4.4 4.3 4.4 4.5 4.2 4.1   Speed 5200 5200 5200 5200 5200 5200 5200   PI 3.6 3.5 3.4 3.5 3.5 3.4 3.9   Power ([place]) 3.6 3.6 3.7 3.6 3.6 3.6 3.6   LSL - - - - 4800 4800 4800   Pulsatility - - - - Pulse Pulse Pulse   Some recent data might be hidden        Assessment:  [x]  Primary Cardiomyopathy [x]  Congestive Heart Failure   []  Atrial Fibrillation []  Ventricular Tachycardia   []  Aftercare cardiac device [x]  Long term (current) use of anticoagulants   []  Ventilator-associated pneumonia []  Pneumonia viral, unspecified   []  Pneumonia, bacterial, unspecified []  Pneumonia, organism unspecified   []  Hemorrhage of GI tract, unspecified    []  Nosebleed              Plan:  [x]  Interval history obtained from ICU attending team member during rounding today  [x]  VAD/MATT teaching performed with patient  [x]  Mobilization / Physical Therapy ongoing  [x]  Anticoagulation [x]  Ongoing []  Held        Total time spent was 31 minutes.  Of which more than 50 percent of the care dominated counseling and coordinating care with different team members. The VAD was interrogated and all parameters were WNL and no significant findings were found in the history. All these findings are documented in the note above.      Date of Service: 08/29/2017       Cardiac Surgery Attending E and M (VAD) Note along with VAD Interrogation    I have seen and examined the patient and agree with the findings above    I also reviewed the patients clinical course and:  [x]  Hemodynamic & Respiratory paramters  [x]  Laboratory Data  [x]  Radiological studies     VAD Interrogated [x]      VAD Function is normal. Changes made []  None [x]        Interrogation of Ventricular assist device was performed with physician analysis of device parameters and review of device function. I have personally reviewed the interrogation findings and agree with findings as stated

## 2017-08-29 NOTE — NURSING TRANSFER
Nursing Transfer Note      8/28/2017 2315    Transfer From: SICU to 312    Transfer via bed    Transfer with cardiac monitoring    Transported by Callie Avitia RN    Medicines sent: IV heparin; IV lasix; IV dobutamine; TPN and lipids infusing    Chart send with patient: Yes    Notified: spouse at bedside    Patient reassessed at: 08/28/2017 2330     Upon arrival to floor: cardiac monitor applied, patient oriented to room, call bell in reach and bed in lowest position; Initial assessment completed and care plan updated.  Instructed on fall precautions and unit routines.  Patient and spouse verbalized understanding.  Will continue to monitor.

## 2017-08-29 NOTE — ASSESSMENT & PLAN NOTE
Nutrition Diagnosis:  Inadequate energy intake    Related to (etiology):   GI bleed and poor appetite    Signs and Symptoms (as evidenced by):   Pt consuming < 25% of meals, and relying on TPN for supplemental nutrition.      Interventions/Recommendations (treatment strategy):  See RD recs above.     Nutrition Diagnosis Status:   New

## 2017-08-29 NOTE — PLAN OF CARE
Problem: Patient Care Overview  Goal: Plan of Care Review  Outcome: Ongoing (interventions implemented as appropriate)  Patient cooperative and pleasant with routine care and procedures.  Wife at bedside and participating in care and assistance of patient needs.  Instructed on fall precautions, sternal precautions, use of incentive spirometer every hour during the day with coughing and deep breathing exercises with understanding verbalized by patient and spouse.  Resting quietly at present.  Will continue to monitor.

## 2017-08-29 NOTE — PLAN OF CARE
Problem: Occupational Therapy Goal  Goal: Occupational Therapy Goal  Goals to be met by:  2 weeks 9/12/17    Patient will increase functional independence with ADLs by performing:  Feeding: Independent   UE Dressing with Supervision.  LE Dressing with Supervision.  Grooming while standing with Supervision.  Toileting from toilet with Supervision for hygiene and clothing management.   Stand pivot transfers with Supervision.  Toilet transfer to toilet with Supervision.  Pt will be supervision  with LVAD yasmin't.     Goals to be met by:  2 weeks 8/28/17    Patient will increase functional independence with ADLs by performing:  Feeding: Independent   UE Dressing with Supervision.  LE Dressing with Supervision.  Grooming while standing with Supervision.  Toileting from toilet with Supervision for hygiene and clothing management.   Stand pivot transfers with Supervision.  Toilet transfer to toilet with Supervision.  Pt will be supervision  with LVAD yasmin't.       Outcome: Ongoing (interventions implemented as appropriate)  Continue OT plan of care.

## 2017-08-29 NOTE — ASSESSMENT & PLAN NOTE
- LVAD HM III. Placed this admit 7/27/17  - CTS Primary  - chest closure (7/28/17) and extubated (7/29/17), reintubated 8/9/17 and extubated 8/13/17  -  @5mcg/kg/min.  - On Lasix gtt at 5, per CTS

## 2017-08-29 NOTE — SUBJECTIVE & OBJECTIVE
Interval History: Pt up in chair this am. No complaints    Continuous Infusions:   Amino acid 5% - dextrose 15% (CLINIMIX-E) solution with additives (1L  provides 510 kcal/L dextrose, with 50 gm AA, 150 gm CHO, Na 35, K 30, Mg 5, Ca 4.5, Acetate 80, Cl 39, Phos 15) 20 mL/hr at 08/28/17 2200    DOBUTamine 5 mcg/kg/min (08/28/17 2200)     Scheduled Meds:   acetaminophen  650 mg Oral Once    amlodipine  2.5 mg Oral Daily    ertapenem (INVANZ) IVPB  1 g Intravenous Q24H    pantoprazole  40 mg Intravenous BID    sodium chloride 0.9%  10 mL Intravenous Q6H    sodium chloride 0.9%  10 mL Intravenous Q6H    warfarin  5 mg Oral Daily     PRN Meds:albumin human 5%, albuterol sulfate, bisacodyl, dextrose 50%, glucagon (human recombinant), hydrALAZINE, insulin aspart, ondansetron, [CANCELED] Flushing PICC Protocol **AND** sodium chloride 0.9% **AND** sodium chloride 0.9%, Flushing PICC Protocol **AND** sodium chloride 0.9% **AND** sodium chloride 0.9%    Review of patient's allergies indicates:  No Known Allergies  Objective:     Vital Signs (Most Recent):  Temp: 98 °F (36.7 °C) (08/29/17 0815)  Pulse: 80 (08/29/17 1100)  Resp: 20 (08/29/17 0815)  BP: (!) 72/0 (08/29/17 1200)  SpO2: 97 % (08/29/17 0815) Vital Signs (24h Range):  Temp:  [97.4 °F (36.3 °C)-98.3 °F (36.8 °C)] 98 °F (36.7 °C)  Pulse:  [] 80  Resp:  [20-39] 20  SpO2:  [81 %-99 %] 97 %  BP: ()/(0-78) 72/0  Arterial Line BP: ()/(55-70) 99/69     Weight: 79.5 kg (175 lb 3.2 oz)  Body mass index is 26.64 kg/m².      Intake/Output Summary (Last 24 hours) at 08/29/17 1407  Last data filed at 08/29/17 0600   Gross per 24 hour   Intake          1620.94 ml   Output             2395 ml   Net          -774.06 ml     Physical Exam     Constitutional: He appears well-developed and well-nourished.   HENT:   Head: Normocephalic and atraumatic.   Eyes: EOM are normal. Pupils are equal, round, and reactive to light.   Cardiovascular: Normal rate, regular  rhythm and normal heart sounds.  Exam reveals no gallop and no friction rub.    No murmur heard.  + LVAD hum    Pulmonary/Chest: Effort normal. No respiratory distress. He has no wheezes. He has no rales.   Abdominal: Soft. Bowel sounds are normal.   Musculoskeletal: He exhibits no edema.   Neurological: He is alert.   Nursing note and vitals reviewed      Significant Labs:  CBC:    Recent Labs  Lab 08/29/17 0520   WBC 16.30*   RBC 2.83*   HGB 7.9*   HCT 25.3*      MCV 89   MCH 27.9   MCHC 31.2*     BNP:    Recent Labs  Lab 08/28/17 0402   BNP 3,020*     CMP:    Recent Labs  Lab 08/28/17 0402 08/29/17  0520     102  --  71   CALCIUM 8.6*  8.6*  --  8.5*   ALBUMIN 2.0*  2.0*  --   --    PROT 6.7  6.7  --   --    *  150*  --  151*   K 4.0  4.0  < > 4.0  4.0   CO2 31*  31*  --  33*     108  --  107   BUN 78*  78*  --  73*   CREATININE 1.8*  1.8*  --  1.8*   ALKPHOS 163*  163*  --   --    ALT 22  22  --   --    AST 25  25  --   --    BILITOT 1.7*  1.7*  --   --    < > = values in this interval not displayed.   Coagulation:     Recent Labs  Lab 08/29/17  0520   INR 2.4*   APTT 32.4*     LDH:    Recent Labs  Lab 08/27/17 0404 08/28/17 0402 08/29/17  0520   * 319* 311*     Microbiology:  Microbiology Results (last 7 days)     Procedure Component Value Units Date/Time    Blood culture [[id number]] Collected:  08/29/17 0836    Order Status:  Sent Specimen:  Blood from Line, Jugular, Internal Right Updated:  08/29/17 0904          Estimated Creatinine Clearance: 38.5 mL/min (based on Cr of 1.8).

## 2017-08-29 NOTE — SUBJECTIVE & OBJECTIVE
Interval History:   Transferred out of ICU yesterday, in good spirits this morning working with PT.  Remains hypernatremic, lasix gtt discontinued this morning.  Adequate UOP, kidney function continues to improve.    Review of patient's allergies indicates:  No Known Allergies  Current Facility-Administered Medications   Medication Frequency    acetaminophen tablet 650 mg Once    albumin human 5% bottle 500 mL PRN    albuterol nebulizer solution 2.5 mg Q4H PRN    Amino acid 5% - dextrose 15% (CLINIMIX-E) solution with additives.  CENTRAL LINE ONLY (1395 mOsm/L). 1L provides 50 gm AA, 150 gm CHO (510 kcal/L dextrose), Na 35, K 30, Mg 5, Ca 4.5, Acetate 80, Cl 39, Phos 15. Continuous    amlodipine tablet 2.5 mg Daily    bisacodyl suppository 10 mg Daily PRN    dextrose 50% injection 12.5 g PRN    DOBUtamine 1000 mg in D5W 250 mL infusion (premix non-titrating) Continuous    ertapenem (INVANZ) 1 g in sodium chloride 0.9 % 100 mL IVPB (ready to mix system) Q24H    glucagon (human recombinant) injection 1 mg PRN    hydrALAZINE injection 10 mg Q6H PRN    insulin aspart pen 0-5 Units Q4H PRN    ondansetron injection 4 mg Q6H PRN    pantoprazole injection 40 mg BID    sodium chloride 0.9% flush 10 mL Q6H    And    sodium chloride 0.9% flush 10 mL PRN    sodium chloride 0.9% flush 10 mL Q6H    And    sodium chloride 0.9% flush 10 mL PRN    warfarin (COUMADIN) tablet 5 mg Daily       Objective:     Vital Signs (Most Recent):  Temp: 98 °F (36.7 °C) (08/29/17 0815)  Pulse: 80 (08/29/17 1100)  Resp: 20 (08/29/17 0815)  BP: (!) 72/0 (08/29/17 1200)  SpO2: 97 % (08/29/17 0815)  O2 Device (Oxygen Therapy): room air (08/29/17 0815) Vital Signs (24h Range):  Temp:  [97.4 °F (36.3 °C)-98.3 °F (36.8 °C)] 98 °F (36.7 °C)  Pulse:  [] 80  Resp:  [20-39] 20  SpO2:  [81 %-99 %] 97 %  BP: ()/(0-78) 72/0  Arterial Line BP: ()/(49-70) 99/69     Weight: 79.5 kg (175 lb 3.2 oz) (08/29/17 0500)  Body mass  index is 26.64 kg/m².  Body surface area is 1.95 meters squared.    I/O last 3 completed shifts:  In: 3174.9 [P.O.:550; I.V.:911]  Out: 6695 [Urine:6695]    Physical Exam   Constitutional: He is oriented to person, place, and time. He appears well-developed and well-nourished. No distress.   HENT:   Head: Normocephalic and atraumatic.   Eyes: EOM are normal.   Cardiovascular: Exam reveals no gallop and no friction rub.    No murmur heard.  Smooth LVAD hum   Pulmonary/Chest: Effort normal and breath sounds normal. No respiratory distress. He has no wheezes. He has no rales.   Abdominal: Soft. There is no tenderness.   Musculoskeletal: He exhibits edema (1+ lower ext.).   Neurological: He is alert and oriented to person, place, and time.   Skin: Skin is warm and dry. No rash noted. He is not diaphoretic.   Vitals reviewed.      Significant Labs:  CBC:   Recent Labs  Lab 08/29/17  0520   WBC 16.30*   RBC 2.83*   HGB 7.9*   HCT 25.3*      MCV 89   MCH 27.9   MCHC 31.2*     CMP:   Recent Labs  Lab 08/28/17  0402  08/29/17  0520     102  --  71   CALCIUM 8.6*  8.6*  --  8.5*   ALBUMIN 2.0*  2.0*  --   --    PROT 6.7  6.7  --   --    *  150*  --  151*   K 4.0  4.0  < > 4.0  4.0   CO2 31*  31*  --  33*     108  --  107   BUN 78*  78*  --  73*   CREATININE 1.8*  1.8*  --  1.8*   ALKPHOS 163*  163*  --   --    ALT 22  22  --   --    AST 25  25  --   --    BILITOT 1.7*  1.7*  --   --    < > = values in this interval not displayed.

## 2017-08-29 NOTE — PT/OT/SLP PROGRESS
Occupational Therapy  Treatment    Suman Hayden   MRN: [id number]   Admitting Diagnosis: LVAD (left ventricular assist device) present    OT Date of Treatment: 08/29/17   OT Start Time: 1007  OT Stop Time: 1046  OT Total Time (min): 39 min    Billable Minutes:  Self Care/Home Management 39 minutes    General Precautions: Standard, aspiration, LVAD, fall, sternal  Orthopedic Precautions: N/A  Braces: N/A    Subjective:  Communicated with RN prior to session. Pt agreeable to OT.    Pain/Comfort  Pain Rating 1: 6/10  Location - Side 1: Left  Location 1: flank  Pain Addressed 1: Reposition, Distraction    Objective:  Patient found with: PICC line, telemetry (LVAD to wall power)     Functional Mobility:  Bed Mobility: NT as pt up in chair and left up in chair    Activities of Daily Living:  Feeding Level of Assistance: Stand by assistance; able to hold cup to drink, although B hands shaky  UE Dressing Level of Assistance: Maximum assistance to don gown around back and LVAD shld consolidation bag    Balance:   Static Sit: GOOD+: Takes MAXIMAL challenges from all directions.    Dynamic Sit: GOOD-: Maintains balance through MODERATE excursions of active trunk movement,       Therapeutic Activities and Exercises:  Pt up in chair upon OT entry, wife present; c/o nausea and difficulty keeping food down; extended time spent with pt and wife on LVAD education; pt able to identify batteries, clips, controller, and driveline with cues; reviewed battery rotation and pt read LVAD numbers and performed self-test, wife recorded numbers; pt switched to battery power with assist to untwist cables and line up half moons; assist to place in shld bag and don straps; reviewed theraputty exercises     AM-PAC 6 CLICK ADL   How much help from another person does this patient currently need?   1 = Unable, Total/Dependent Assistance  2 = A lot, Maximum/Moderate Assistance  3 = A little, Minimum/Contact Guard/Supervision  4 = None, Modified  "[place]/Independent    Putting on and taking off regular lower body clothing? : 2  Bathing (including washing, rinsing, drying)?: 2  Toileting, which includes using toilet, bedpan, or urinal? : 2  Putting on and taking off regular upper body clothing?: 2  Taking care of personal grooming such as brushing teeth?: 3  Eating meals?: 3  Total Score: 14     AM-PAC Raw Score CMS "G-Code Modifier Level of Impairment Assistance   6 % Total / Unable   7 - 8 CM 80 - 100% Maximal Assist   9-13 CL 60 - 80% Moderate Assist   14 - 19 CK 40 - 60% Moderate Assist   20 - 22 CJ 20 - 40% Minimal Assist   23 CI 1-20% SBA / CGA   24 CH 0% Independent/ Mod I       Patient left up in chair with all lines intact, call button in reach and wife and PT present    ASSESSMENT:  Suman Hayden is a 67 y.o. male with a medical diagnosis of LVAD (left ventricular assist device) present and presents with good effort and participation in therapy. Pt making progress with LVAD education and ADLs and would continue to benefit from OT to increase independence. Recommend rehab upon D/C.    Rehab identified problem list/impairments: Rehab identified problem list/impairments: weakness, impaired endurance, impaired self care skills, impaired functional mobilty, pain, decreased upper extremity function, impaired coordination, impaired cardiopulmonary response to activity    Rehab potential is good.    Activity tolerance: Good    Discharge recommendations: Discharge Facility/Level Of Care Needs: rehabilitation facility     Barriers to discharge: Barriers to Discharge: Decreased caregiver support (at current functional level)    Equipment recommendations:  (TBD)     GOALS:    Occupational Therapy Goals        Problem: Occupational Therapy Goal    Goal Priority Disciplines Outcome Interventions   Occupational Therapy Goal     OT, PT/OT Ongoing (interventions implemented as appropriate)    Description:  Goals to be met by:  2 weeks 9/12/17    Patient " will increase functional independence with ADLs by performing:  Feeding: Independent   UE Dressing with Supervision.  LE Dressing with Supervision.  Grooming while standing with Supervision.  Toileting from toilet with Supervision for hygiene and clothing management.   Stand pivot transfers with Supervision.  Toilet transfer to toilet with Supervision.  Pt will be supervision  with LVAD yasmin't.     Goals to be met by:  2 weeks 8/28/17    Patient will increase functional independence with ADLs by performing:  Feeding: Independent   UE Dressing with Supervision.  LE Dressing with Supervision.  Grooming while standing with Supervision.  Toileting from toilet with Supervision for hygiene and clothing management.   Stand pivot transfers with Supervision.  Toilet transfer to toilet with Supervision.  Pt will be supervision  with LVAD yasmin't.                   Multidisciplinary Problems (Resolved)        Problem: Occupational Therapy Goal    Goal Priority Disciplines Outcome Interventions   Occupational Therapy Goal   (Resolved)     OT, PT/OT  Error    Description:  Goals to be met by: 8/04/2017    Patient will increase functional independence with ADLs by performing:    Increased functional strength to 5/5 for improved ADL performance.  Upper extremity exercise program and R LE ankle pumps  3x 10 reps per handout, with independence.                      Plan:  Patient to be seen 6 x/week to address the above listed problems via self-care/home management, therapeutic activities, therapeutic exercises  Plan of Care expires: 08/29/17  Plan of Care reviewed with: patient, spouse    DELMY Mohamud  08/29/2017

## 2017-08-29 NOTE — NURSING TRANSFER
Nursing Transfer Note      8/28/2017     Transfer To 312 from [id number]     Transfer via bed    Transfer with cardiac monitoring    Transported by RN and PCT    Medicines sent: on IV pole    Chart send with patient: Yes    Notified: spouse    Patient reassessed at: 08/28/2017 at 2330    Upon arrival to floor: cardiac monitor applied, patient oriented to room, call bell in reach and bed in lowest position, Leigh receiving RN, charge RN and spouse at bedside, VSS, pt in no distress.

## 2017-08-29 NOTE — PLAN OF CARE
Problem: Physical Therapy Goal  Goal: Physical Therapy Goal  Goals to be met by: 17     Patient will increase functional independence with mobility by performin. Supine to sit with MInimal Assistance-  Met 2017  2. Sit to supine with MInimal Assistance - not met  3. Sit to stand transfer with Minimal Assistance- not met  4. Bed to chair transfer with Minimal Assistance- not met  5. Gait  x 50 feet with Minimal Assistance. -  Met 2017  6. Lower extremity exercise program x15 reps, with supervision, in order to increase LE strength and (I) with functional mobility. - not met  7.Pt mod I supine to sit- not met  8. Pt receive gait training ~ 220 ft with AD if needed and supervision- not met        Goals updated and are appropriate. Astrid Whaley, PT 2017

## 2017-08-29 NOTE — ASSESSMENT & PLAN NOTE
- ESBL bacteremia. Currently on ertapenem- increase dose today due to kidney function improving.   - ID following.

## 2017-08-29 NOTE — PROGRESS NOTES
Dr. Downing and Nadia at bedside. VORB to draw Bloodcultures x1 from the Trialysis to (R) IJ, then remove catheter before getting pt OOB. Orders carried out, will continue to monitor.

## 2017-08-29 NOTE — PROGRESS NOTES
[facility]  Nephrology  Progress Note    Patient Name: Suman Hayden  MRN: [id number]  Admission Date: 7/18/2017  Hospital Length of Stay: 42 days  Attending Provider: Sunny Downing MD   Primary Care Physician: Joe Ernst MD  Principal Problem:LVAD (left ventricular assist device) present    Subjective:     HPI: Mr. Will is a 66 yo AAM with PMHx of NICM, HTN, HLD, and CKD III who presented to the hospital on 7/18 after syncopal episode at home.  He underwent LVAD placement on 7/27.  Labs that day showed a spike in his SCr to 1.5, but he quickly returned to baseline (1.2-1.3) and stayed stable until 08/01 when he increased to 1.5 and has steadily remained above baseline since, up to 2.3 on consult.  He has remained on lasix infusion with intermittent dosages of diuril to aid in diuresis.  On 08/3, there was a reported decrease in LVAD flows which resolved with decrease in lasix infusion and administration of 1 unit of PRBCs, likely causing decrease renal perfusion leading to an ischemic event.  He continues with adequate UOP on lasix gtt. He was transferred to the floor from ICU on 08/08 and was noted to become hypotensive with a doppler pressure of 58, since been requiring pressors for BP support.  Nephrology was consulted for INDIRA and decreased UOP.    Interval History:   Transferred out of ICU yesterday, in good spirits this morning working with PT.  Remains hypernatremic, lasix gtt discontinued this morning.  Adequate UOP, kidney function continues to improve.    Review of patient's allergies indicates:  No Known Allergies  Current Facility-Administered Medications   Medication Frequency    acetaminophen tablet 650 mg Once    albumin human 5% bottle 500 mL PRN    albuterol nebulizer solution 2.5 mg Q4H PRN    Amino acid 5% - dextrose 15% (CLINIMIX-E) solution with additives.  CENTRAL LINE ONLY (1395 mOsm/L). 1L provides 50 gm AA, 150 gm CHO (510 kcal/L dextrose), Na 35, K 30, Mg 5, Ca  4.5, Acetate 80, Cl 39, Phos 15. Continuous    amlodipine tablet 2.5 mg Daily    bisacodyl suppository 10 mg Daily PRN    dextrose 50% injection 12.5 g PRN    DOBUtamine 1000 mg in D5W 250 mL infusion (premix non-titrating) Continuous    ertapenem (INVANZ) 1 g in sodium chloride 0.9 % 100 mL IVPB (ready to mix system) Q24H    glucagon (human recombinant) injection 1 mg PRN    hydrALAZINE injection 10 mg Q6H PRN    insulin aspart pen 0-5 Units Q4H PRN    ondansetron injection 4 mg Q6H PRN    pantoprazole injection 40 mg BID    sodium chloride 0.9% flush 10 mL Q6H    And    sodium chloride 0.9% flush 10 mL PRN    sodium chloride 0.9% flush 10 mL Q6H    And    sodium chloride 0.9% flush 10 mL PRN    warfarin (COUMADIN) tablet 5 mg Daily       Objective:     Vital Signs (Most Recent):  Temp: 98 °F (36.7 °C) (08/29/17 0815)  Pulse: 80 (08/29/17 1100)  Resp: 20 (08/29/17 0815)  BP: (!) 72/0 (08/29/17 1200)  SpO2: 97 % (08/29/17 0815)  O2 Device (Oxygen Therapy): room air (08/29/17 0815) Vital Signs (24h Range):  Temp:  [97.4 °F (36.3 °C)-98.3 °F (36.8 °C)] 98 °F (36.7 °C)  Pulse:  [] 80  Resp:  [20-39] 20  SpO2:  [81 %-99 %] 97 %  BP: ()/(0-78) 72/0  Arterial Line BP: ()/(49-70) 99/69     Weight: 79.5 kg (175 lb 3.2 oz) (08/29/17 0500)  Body mass index is 26.64 kg/m².  Body surface area is 1.95 meters squared.    I/O last 3 completed shifts:  In: 3174.9 [P.O.:550; I.V.:911]  Out: 6695 [Urine:6695]    Physical Exam   Constitutional: He is oriented to person, place, and time. He appears well-developed and well-nourished. No distress.   HENT:   Head: Normocephalic and atraumatic.   Eyes: EOM are normal.   Cardiovascular: Exam reveals no gallop and no friction rub.    No murmur heard.  Smooth LVAD hum   Pulmonary/Chest: Effort normal and breath sounds normal. No respiratory distress. He has no wheezes. He has no rales.   Abdominal: Soft. There is no tenderness.   Musculoskeletal: He exhibits  edema (1+ lower ext.).   Neurological: He is alert and oriented to person, place, and time.   Skin: Skin is warm and dry. No rash noted. He is not diaphoretic.   Vitals reviewed.      Significant Labs:  CBC:   Recent Labs  Lab 08/29/17  0520   WBC 16.30*   RBC 2.83*   HGB 7.9*   HCT 25.3*      MCV 89   MCH 27.9   MCHC 31.2*     CMP:   Recent Labs  Lab 08/28/17  0402  08/29/17  0520     102  --  71   CALCIUM 8.6*  8.6*  --  8.5*   ALBUMIN 2.0*  2.0*  --   --    PROT 6.7  6.7  --   --    *  150*  --  151*   K 4.0  4.0  < > 4.0  4.0   CO2 31*  31*  --  33*     108  --  107   BUN 78*  78*  --  73*   CREATININE 1.8*  1.8*  --  1.8*   ALKPHOS 163*  163*  --   --    ALT 22  22  --   --    AST 25  25  --   --    BILITOT 1.7*  1.7*  --   --    < > = values in this interval not displayed.         Assessment/Plan:     Hypernatremia    - 2/2 dehydration  - iatrogenic from lasix-induced polyuria   - Na continues to rise; concerned that dehydration will soon result in a pre-renal INDIRA  -3L free water deficit.  Recommend starting D5W @ 75 cc/hr x 1.5L as patient unlikely to correct himself with oral fluids alone.    -kidney function has returned to baseline, will sign-off at this time.  Please re-consult as needed.            Alfonzo Medina, DARLING  Nephrology  [facility]  Pager:  [phone]    I have reviewed and concur with the NP's history, physical, assessment, and plan. I have personally interviewed and examined the patient at bedside. See below addendum for my evaluation and additional findings.Encourage free water intake or intravenous  5% dextrose.

## 2017-08-29 NOTE — PROCEDURES
TXP LVAD INTERROGATIONS 8/29/2017 8/29/2017 8/28/2017 8/28/2017 8/28/2017 8/28/2017 8/28/2017   Type HeartMate3 HeartMate3 HeartMate3 HeartMate3 HeartMate3 HeartMate3 HeartMate3   Flow 4.3 4.4 4.5 4.2 4.1 4.2 4.4   Speed 5200 5200 5200 5200 5200 5200 5200   PI 3.4 3.5 3.5 3.4 3.9 3.4 3.4   Power ([place]) 3.7 3.6 3.6 3.6 3.6 3.6 3.6   LSL - - 4800 4800 4800 4800 -   Pulsatility - - Pulse Pulse Pulse Pulse -   Some recent data might be hidden     Pt. And wife aa0x3.   HCT 25.3  Modular cable cdi.  VAD sounds HM3.  Non-pulsatile  No alarms overnight.    VAD teaching with pt. Including self test and batteries.

## 2017-08-29 NOTE — PT/OT/SLP PROGRESS
Physical Therapy  Treatment    Suman Hayden   MRN: [id number]   Admitting Diagnosis: LVAD (left ventricular assist device) present    PT Received On: 08/29/17  PT Start Time: 1051     PT Stop Time: 1119    PT Total Time (min): 28 min       Billable Minutes:  Gait [id number] min    Treatment Type: Treatment  PT/PTA: PT     PTA Visit Number: 0       General Precautions: Standard, LVAD, sternal, fall, aspiration  Orthopedic Precautions: N/A   Braces:      Do you have any cultural, spiritual, Episcopal conflicts, given your current situation?: none noted     Subjective:  Communicated with nurse prior to session.      Pain/Comfort  Pain Rating 1: 6/10  Location - Side 1: Left  Location 1:  (flank)  Pain Rating Post-Intervention 1: 6/10    Objective:   Patient found with: PICC line, telemetry (LVAD)    Functional Mobility:  Bed Mobility:   Scooting/Bridging:  (not tested. pt was sitting in chair for treatment. )    Transfers:  Sit <> Stand Assistance: Maximum Assistance (pt needed verbal cues for functional mobility with sternal precautions.)  Sit <> Stand Assistive Device: Rolling Walker ( bilateral platform)    Gait:   Gait Distance: 20 ft, 38 ft, 36 ft, 52 ft, 32 ft (total 178 ft) with sitting rest periods between distance ambulated. pt had emergency bag present.   Assistance 1: total assist , CGA for gait training and additional assist for equipment.   Gait Assistive Device:  (bilateral platform RW)  Gait Pattern: 3-point gait    AM-PAC 6 CLICK MOBILITY  How much help from another person does this patient currently need?   1 = Unable, Total/Dependent Assistance  2 = A lot, Maximum/Moderate Assistance  3 = A little, Minimum/Contact Guard/Supervision  4 = None, Modified [place]/Independent    Turning over in bed (including adjusting bedclothes, sheets and blankets)?: 3  Sitting down on and standing up from a chair with arms (e.g., wheelchair, bedside commode, etc.): 2  Moving from lying on back to sitting on the side  of the bed?: 3  Moving to and from a bed to a chair (including a wheelchair)?: 3  Need to walk in hospital room?: 2  Climbing 3-5 steps with a railing?: 1  Total Score: 14    AM-PAC Raw Score CMS G-Code Modifier Level of Impairment Assistance   6 % Total / Unable   7 - 9 CM 80 - 100% Maximal Assist   10 - 14 CL 60 - 80% Moderate Assist   15 - 19 CK 40 - 60% Moderate Assist   20 - 22 CJ 20 - 40% Minimal Assist   23 CI 1-20% SBA / CGA   24 CH 0% Independent/ Mod I     Patient left up in chair with all lines intact, call button in reach and wife present.    Assessment:  Suman Hayden is a 67 y.o. male with a medical diagnosis of LVAD (left ventricular assist device) present and presents with decreased strength, mobility, transfers, balance and decreased distance ambulated. Pt would benefit from cont PT to address deficits and will need inpt rehab when medically stable. Pt will benefit from skilled PT 6x/wk to progress physically and decrease complications from immobility. Pt is s/p  LVAD HM 3 placement 8/16, sternal washout 8/17, chest closure. 8/18. Pt was re-intubated 8/9 and extubated 8/13/17.      Rehab identified problem list/impairments: Rehab identified problem list/impairments: weakness, impaired endurance, impaired functional mobilty, gait instability, impaired balance, decreased lower extremity function    Rehab potential is good.    Activity tolerance: Good    Discharge recommendations: Discharge Facility/Level Of Care Needs: rehabilitation facility     Barriers to discharge: Barriers to Discharge: Decreased caregiver support (wife will not be able to assist pt at current functional level. )    Equipment recommendations: Equipment Needed After Discharge:  (will determine DME needs closer to discharge.)     GOALS:    Physical Therapy Goals        Problem: Physical Therapy Goal    Goal Priority Disciplines Outcome Goal Variances Interventions   Physical Therapy Goal     PT/OT, PT Ongoing (interventions  implemented as appropriate)     Description:  Goals to be met by: 17     Patient will increase functional independence with mobility by performin. Supine to sit with MInimal Assistance-  Met 2017  2. Sit to supine with MInimal Assistance - not met  3. Sit to stand transfer with Minimal Assistance- not met  4. Bed to chair transfer with Minimal Assistance- not met  5. Gait  x 50 feet with Minimal Assistance. -  Met 2017  6. Lower extremity exercise program x15 reps, with supervision, in order to increase LE strength and (I) with functional mobility. - not met  7.Pt mod I supine to sit- not met  8. Pt receive gait training ~ 220 ft with AD if needed and supervision- not met                          PLAN:    Patient to be seen 6 x/week  to address the above listed problems via gait training, therapeutic activities, therapeutic exercises  Plan of Care expires: 17  Plan of Care reviewed with: patient, spouse         Astrid DUSTY Whaley, PT  2017

## 2017-08-29 NOTE — PROGRESS NOTES
[facility]  Adult Nutrition  Progress Note    SUMMARY     Recommendations    Recommendation/Intervention: Pt continues with poor intake. Recommend increasing TPN rate to 40 mL/hr + IVLE to provide 50% estimated energy and protein needs. Once oral intake improves and pt is consistently consuming > 50% of all meals, begin to wean TPN. Continue oral diet, recommend low Na restrictions. Encourage optimal intake. RD following.         Goals: Meet >/=85% of EEN/EPN  Nutrition Goal Status: progressing towards goal  Communication of RD Recs: reviewed with RN         Reason for Assessment    Reason for Assessment: RD follow-up  Diagnosis:  (s/p LVAD)  Relevent Medical History: NICM, HTN, HLD   Interdisciplinary Rounds: attended     General Information Comments:  TPN modified and further decreased to 20 mL/hr and is now providing 43% of energy needs and 24 % of protein needs. Pt with very poor oral intake, wife at bedside encouraging pt to try new foods. Pt appears to be very distressed with wife continuing to press PO intake on him. RD provided food suggestions and ways to increase energy intake. Only drank 1.5 Boosts and no food prior to visit today (12pm)    Nutrition Discharge Planning: Adequate PO nutrition    Nutrition Prescription Ordered    Current Diet Order: Cardiac  Nutrition Order Comments: < 25% PO intake  Current Nutrition Support Formula Ordered: Clinimix E 5/15  Current Nutrition Support Rate Ordered: 20 (ml)  Current Nutrition Support Frequency Ordered: mL/hr  Oral Nutrition Supplement: Boost Plus     Evaluation of Received Nutrients/Fluid Intake     Parenteral Calories (kcal): 841  Parenteral Protein (gm): 24  Parenteral Fluid (mL): 480           Other Calories (kcal): 0  Total Calories (kcal):  (-)        GIR (Glucose Infusion Rate) (mg/kg/min): 0.21 mg/kg/min  Lipid Calories (kcals): 500 kcals  I/O: -1.728L x 24 hrs         Fluid Required: meeting needs (per MD)  Comments: LBM  "  Tolerance: tolerating  % Intake of Estimated Energy Needs: 50 - 75 %  % Meal Intake: 25%     Nutrition Risk Screen     Nutrition Risk Screen: no indicators present    Nutrition/Diet History    Patient Reported Diet/Restrictions/Preferences: general, low salt  Typical Food/Fluid Intake: TPN; diet upgraded to Purreed today  Food Preferences: MARIE        Factors Affecting Nutritional Intake: decreased appetite, difficulty/impaired swallowing                Labs/Tests/Procedures/Meds       Pertinent Labs Reviewed: reviewed  Pertinent Labs Comments: K 4.0, bun 73, Cr 1.8, gfr 44  Pertinent Medications Reviewed: reviewed  Pertinent Medications Comments: warfarin, oxycodone    Physical Findings    Overall Physical Appearance: lethargic  Tubes: other (see comments) (none)  Oral/Mouth Cavity: tooth/teeth missing  Skin: incision (chest incision)    Anthropometrics    Temp: 98.1 °F (36.7 °C)     Height: 5' 8" (172.7 cm)  Weight Method: Bed Scale  Weight: 79.5 kg (175 lb 3.2 oz)  Ideal Body Weight (IBW), Male: 154 lb     % Ideal Body Weight, Male (lb): 127.27 lb     BMI (Calculated): 29.9  BMI Grade: 25 - 29.9 - overweight     Usual Body Weight (UBW), k.8 kg (admit wt)     % Usual Body Weight: 115.78  % Weight Change From Usual Weight: -15.78 %             Estimated/Assessed Needs    Weight Used For Calorie Calculations: 79.9 kg (176 lb 2.4 oz) (dosing wt)      Energy Calorie Requirements (kcal):   Energy Need Method: [place]-St Jeor (x 1.25 (PAL))        RMR ([place]-St. Jeor Equation): 1548.5        Weight Used For Protein Calculations: 79.9 kg (176 lb 2.4 oz) (dosing wt)  Protein Requirements:  gms (1.2-1.4 gms/kg dosing wt)  Fluid Need Method: RDA Method, other (see comments) (Per MD or 1 mL/kcal)        RDA Method (mL):          Assessment and Plan    Upper GI bleed    Nutrition Diagnosis:  Inadequate energy intake    Related to (etiology):   GI bleed and poor appetite    Signs and Symptoms (as " evidenced by):   Pt consuming < 25% of meals, and relying on TPN for supplemental nutrition.      Interventions/Recommendations (treatment strategy):  See RD recs above.     Nutrition Diagnosis Status:   New              Monitor and Evaluation    Food and Nutrient Intake: parenteral nutrition intake, energy intake  Food and Nutrient Adminstration: diet order, enteral and parenteral nutrition administration     Physical Activity and Function: nutrition-related ADLs and IADLs  Anthropometric Measurements: weight, weight change, body mass index  Biochemical Data, Medical Tests and Procedures: gastrointestinal profile, electrolyte and renal panel, glucose/endocrine profile, lipid profile, inflammatory profile  Nutrition-Focused Physical Findings: overall appearance    Nutrition Risk    Level of Risk:  (2x/week)    Nutrition Follow-Up    RD Follow-up?: Yes

## 2017-08-30 PROBLEM — L89.153 STAGE III PRESSURE ULCER OF SACRAL REGION: Status: ACTIVE | Noted: 2017-08-30

## 2017-08-30 LAB
ALBUMIN SERPL BCP-MCNC: 2.2 G/DL
ALP SERPL-CCNC: 151 U/L
ALT SERPL W/O P-5'-P-CCNC: 20 U/L
ANION GAP SERPL CALC-SCNC: 7 MMOL/L
ANISOCYTOSIS BLD QL SMEAR: SLIGHT
APTT BLDCRRT: 33.1 SEC
AST SERPL-CCNC: 28 U/L
BASOPHILS # BLD AUTO: 0.07 K/UL
BASOPHILS NFR BLD: 0.5 %
BILIRUB DIRECT SERPL-MCNC: 1 MG/DL
BILIRUB SERPL-MCNC: 1.6 MG/DL
BNP SERPL-MCNC: 2643 PG/ML
BUN SERPL-MCNC: 67 MG/DL
CALCIUM SERPL-MCNC: 8.8 MG/DL
CHLORIDE SERPL-SCNC: 109 MMOL/L
CO2 SERPL-SCNC: 34 MMOL/L
CREAT SERPL-MCNC: 1.8 MG/DL
CRP SERPL-MCNC: 59 MG/L
DIFFERENTIAL METHOD: ABNORMAL
EOSINOPHIL # BLD AUTO: 0.5 K/UL
EOSINOPHIL NFR BLD: 3.7 %
ERYTHROCYTE [DISTWIDTH] IN BLOOD BY AUTOMATED COUNT: 17.6 %
EST. GFR  (AFRICAN AMERICAN): 44 ML/MIN/1.73 M^2
EST. GFR  (NON AFRICAN AMERICAN): 38.1 ML/MIN/1.73 M^2
GLUCOSE SERPL-MCNC: 65 MG/DL
HCT VFR BLD AUTO: 25.8 %
HGB BLD-MCNC: 7.8 G/DL
HYPOCHROMIA BLD QL SMEAR: ABNORMAL
INR PPP: 3
LDH SERPL L TO P-CCNC: 326 U/L
LYMPHOCYTES # BLD AUTO: 2 K/UL
LYMPHOCYTES NFR BLD: 14.3 %
MAGNESIUM SERPL-MCNC: 2.2 MG/DL
MCH RBC QN AUTO: 27.9 PG
MCHC RBC AUTO-ENTMCNC: 30.2 G/DL
MCV RBC AUTO: 92 FL
MONOCYTES # BLD AUTO: 1.1 K/UL
MONOCYTES NFR BLD: 7.8 %
NEUTROPHILS # BLD AUTO: 10.1 K/UL
NEUTROPHILS NFR BLD: 73.7 %
OVALOCYTES BLD QL SMEAR: ABNORMAL
PHOSPHATE SERPL-MCNC: 3.9 MG/DL
PLATELET # BLD AUTO: 307 K/UL
PLATELET BLD QL SMEAR: ABNORMAL
PMV BLD AUTO: 11.9 FL
POCT GLUCOSE: 121 MG/DL (ref 70–110)
POCT GLUCOSE: 187 MG/DL (ref 70–110)
POCT GLUCOSE: 62 MG/DL (ref 70–110)
POCT GLUCOSE: 64 MG/DL (ref 70–110)
POCT GLUCOSE: 73 MG/DL (ref 70–110)
POCT GLUCOSE: 94 MG/DL (ref 70–110)
POCT GLUCOSE: 99 MG/DL (ref 70–110)
POIKILOCYTOSIS BLD QL SMEAR: SLIGHT
POLYCHROMASIA BLD QL SMEAR: ABNORMAL
POTASSIUM SERPL-SCNC: 3.7 MMOL/L
PREALB SERPL-MCNC: 13 MG/DL
PROT SERPL-MCNC: 6.9 G/DL
PROTHROMBIN TIME: 30 SEC
RBC # BLD AUTO: 2.8 M/UL
SCHISTOCYTES BLD QL SMEAR: ABNORMAL
SODIUM SERPL-SCNC: 150 MMOL/L
SPHEROCYTES BLD QL SMEAR: ABNORMAL
TARGETS BLD QL SMEAR: ABNORMAL
WBC # BLD AUTO: 13.75 K/UL

## 2017-08-30 PROCEDURE — 86140 C-REACTIVE PROTEIN: CPT

## 2017-08-30 PROCEDURE — 99232 SBSQ HOSP IP/OBS MODERATE 35: CPT | Mod: ,,, | Performed by: INTERNAL MEDICINE

## 2017-08-30 PROCEDURE — 27000248 HC VAD-ADDITIONAL DAY

## 2017-08-30 PROCEDURE — 93750 INTERROGATION VAD IN PERSON: CPT | Mod: ,,, | Performed by: THORACIC SURGERY (CARDIOTHORACIC VASCULAR SURGERY)

## 2017-08-30 PROCEDURE — 97116 GAIT TRAINING THERAPY: CPT

## 2017-08-30 PROCEDURE — 25000003 PHARM REV CODE 250: Performed by: STUDENT IN AN ORGANIZED HEALTH CARE EDUCATION/TRAINING PROGRAM

## 2017-08-30 PROCEDURE — 92526 ORAL FUNCTION THERAPY: CPT

## 2017-08-30 PROCEDURE — 63600175 PHARM REV CODE 636 W HCPCS: Performed by: PHYSICIAN ASSISTANT

## 2017-08-30 PROCEDURE — 83880 ASSAY OF NATRIURETIC PEPTIDE: CPT

## 2017-08-30 PROCEDURE — 83615 LACTATE (LD) (LDH) ENZYME: CPT

## 2017-08-30 PROCEDURE — 85610 PROTHROMBIN TIME: CPT

## 2017-08-30 PROCEDURE — 36415 COLL VENOUS BLD VENIPUNCTURE: CPT

## 2017-08-30 PROCEDURE — 63600175 PHARM REV CODE 636 W HCPCS: Performed by: THORACIC SURGERY (CARDIOTHORACIC VASCULAR SURGERY)

## 2017-08-30 PROCEDURE — 84100 ASSAY OF PHOSPHORUS: CPT

## 2017-08-30 PROCEDURE — C9113 INJ PANTOPRAZOLE SODIUM, VIA: HCPCS | Performed by: NURSE PRACTITIONER

## 2017-08-30 PROCEDURE — 25000003 PHARM REV CODE 250: Performed by: NURSE PRACTITIONER

## 2017-08-30 PROCEDURE — 20600001 HC STEP DOWN PRIVATE ROOM

## 2017-08-30 PROCEDURE — 25000003 PHARM REV CODE 250: Performed by: THORACIC SURGERY (CARDIOTHORACIC VASCULAR SURGERY)

## 2017-08-30 PROCEDURE — 97535 SELF CARE MNGMENT TRAINING: CPT

## 2017-08-30 PROCEDURE — 83735 ASSAY OF MAGNESIUM: CPT

## 2017-08-30 PROCEDURE — 80048 BASIC METABOLIC PNL TOTAL CA: CPT

## 2017-08-30 PROCEDURE — 84134 ASSAY OF PREALBUMIN: CPT

## 2017-08-30 PROCEDURE — 25000003 PHARM REV CODE 250: Performed by: ANESTHESIOLOGY

## 2017-08-30 PROCEDURE — 85025 COMPLETE CBC W/AUTO DIFF WBC: CPT

## 2017-08-30 PROCEDURE — 63600175 PHARM REV CODE 636 W HCPCS: Performed by: NURSE PRACTITIONER

## 2017-08-30 PROCEDURE — A4216 STERILE WATER/SALINE, 10 ML: HCPCS | Performed by: THORACIC SURGERY (CARDIOTHORACIC VASCULAR SURGERY)

## 2017-08-30 PROCEDURE — 99233 SBSQ HOSP IP/OBS HIGH 50: CPT | Mod: 24,,, | Performed by: THORACIC SURGERY (CARDIOTHORACIC VASCULAR SURGERY)

## 2017-08-30 PROCEDURE — 85730 THROMBOPLASTIN TIME PARTIAL: CPT

## 2017-08-30 PROCEDURE — 97530 THERAPEUTIC ACTIVITIES: CPT

## 2017-08-30 PROCEDURE — 80076 HEPATIC FUNCTION PANEL: CPT

## 2017-08-30 RX ORDER — WARFARIN 3 MG/1
3 TABLET ORAL DAILY
Status: DISCONTINUED | OUTPATIENT
Start: 2017-08-30 | End: 2017-08-31

## 2017-08-30 RX ORDER — HYDROCODONE BITARTRATE AND ACETAMINOPHEN 5; 325 MG/1; MG/1
1 TABLET ORAL EVERY 6 HOURS PRN
Status: DISCONTINUED | OUTPATIENT
Start: 2017-08-30 | End: 2017-09-22 | Stop reason: HOSPADM

## 2017-08-30 RX ADMIN — HYDROCODONE BITARTRATE AND ACETAMINOPHEN 1 TABLET: 5; 325 TABLET ORAL at 08:08

## 2017-08-30 RX ADMIN — ERTAPENEM SODIUM 1 G: 1 INJECTION, POWDER, LYOPHILIZED, FOR SOLUTION INTRAMUSCULAR; INTRAVENOUS at 08:08

## 2017-08-30 RX ADMIN — DEXTROSE MONOHYDRATE 12.5 G: 25 INJECTION, SOLUTION INTRAVENOUS at 04:08

## 2017-08-30 RX ADMIN — WARFARIN SODIUM 3 MG: 3 TABLET ORAL at 03:08

## 2017-08-30 RX ADMIN — HYDROCODONE BITARTRATE AND ACETAMINOPHEN 1 TABLET: 5; 325 TABLET ORAL at 01:08

## 2017-08-30 RX ADMIN — PANTOPRAZOLE SODIUM 40 MG: 40 INJECTION, POWDER, FOR SOLUTION INTRAVENOUS at 08:08

## 2017-08-30 RX ADMIN — Medication 10 ML: at 12:08

## 2017-08-30 RX ADMIN — DOBUTAMINE IN DEXTROSE 5 MCG/KG/MIN: 400 INJECTION, SOLUTION INTRAVENOUS at 01:08

## 2017-08-30 RX ADMIN — AMLODIPINE BESYLATE 2.5 MG: 2.5 TABLET ORAL at 08:08

## 2017-08-30 NOTE — PROGRESS NOTES
Blood glucose 64 at 0438. Pt is asymptomatic. Rechecked 62. D50 12.5g given. BG rechecked 187. Will continue to check the BG every 4 hrs and monitor the patient.

## 2017-08-30 NOTE — PT/OT/SLP PROGRESS
Speech Language Pathology      Suman Hayden  MRN: [id number]  CTSU 312/CTSU 312 A     Patient not seen today secondary to pt working with PT/OT.     SHERRI Hart, CCC-SLP, [facility], 8/30/2017

## 2017-08-30 NOTE — PLAN OF CARE
Problem: Patient Care Overview  Goal: Plan of Care Review  Outcome: Ongoing (interventions implemented as appropriate)  Pt denies Chest pain, SOB or nausea or headache. VAD HM III-S&W daily. Family does but need couple more observation. No falls, trauma or injury noted. VSS. VAD numbers WNL. R UA PICC line.  infusing. IV antibiotics. Contact precautions continued. Plan of care reviewed with patient. No further questions at this time. No significant events. Will continue to monitor.

## 2017-08-30 NOTE — CONSULTS
Consult received re: calorie count. Envelope placed on pts door. Please collect all tray slips and record percent consumed of each item. Will follow-up to assess caloric intake. Will continue to follow pt as previously scheduled.

## 2017-08-30 NOTE — PT/OT/SLP PROGRESS
Physical Therapy  Treatment    Suman Hayden   MRN: [id number]   Admitting Diagnosis: LVAD (left ventricular assist device) present    PT Received On: 08/30/17  PT Start Time: 1429     PT Stop Time: 1522    PT Total Time (min): 53 min       Billable Minutes:  Gait [id number]    Treatment Type: Treatment  PT/PTA: PT     PTA Visit Number: 0       General Precautions: Standard, LVAD, fall, sternal, aspiration, contact  Orthopedic Precautions: N/A   Braces: N/A    Do you have any cultural, spiritual, Christianity conflicts, given your current situation?: none noted     Subjective:  Communicated with RN prior to session.  Pt agreeable to therapy.     Pain/Comfort  Pain Rating 1:  (did not rate)  Location - Side 1: Left  Location 1: chest (at pump site)  Pain Addressed 1: Reposition, Distraction, Pre-medicate for activity    Objective:   Patient found with: telemetry, PICC line (LVAD to battery power)    Functional Mobility:  Bed Mobility:   Supine to Sit:  (not performed 2* pt UIC before and after treatment session)    Transfers:  Sit <> Stand Assistance: Maximum Assistance, Moderate Assistance (with assist of 2; maxAx2 x3 reps; modAx2 x1 rep)  Sit <> Stand Assistive Device: Rolling Walker (B platform RW)    Max v/c and t/c for maintaining sternal precautions throughout transfers    Gait:   Gait Distance: 44 ft. + 68 ft. + 138 ft. + 250 ft. (seated rest breaks between ambulation trials)  Assistance 1: Contact Guard Assistance, Minimum assistance (mostly CGA with occasional Pio for balance)  Gait Assistive Device:  (B platform RW)  Gait Pattern: 3-point gait  Gait Deviation(s): decreased hal, increased time in double stance, decreased velocity of limb motion, decreased step length, decreased weight-shifting ability, forward lean, decreased toe-to-floor clearance (increased trunk flexion; decreased hip extension BLE; narrow ARVIN )   Chair follow throughout   Emergency bag present throughout    V/c and t/c throughout for  upright posture, forward gaze, increased stride width, and increased hip ext.     Balance:   Static Sit: SBA  Dynamic Sit: CGA-Pio  Static Stand: CGA  Dynamic stand: CGA-Pio     Therapeutic Activities and Exercises:  Pt found on battery power. Required iPo to don consolidation bag 2* IV lines in place.    Reviewed sternal precautions and the importance of maintaining precautions. Pt verbalized understanding but required continued cueing throughout session to maintain with decreased compliance noted.     AM-PAC 6 CLICK MOBILITY  How much help from another person does this patient currently need?   1 = Unable, Total/Dependent Assistance  2 = A lot, Maximum/Moderate Assistance  3 = A little, Minimum/Contact Guard/Supervision  4 = None, Modified [place]/Independent    Turning over in bed (including adjusting bedclothes, sheets and blankets)?: 3  Sitting down on and standing up from a chair with arms (e.g., wheelchair, bedside commode, etc.): 2  Moving from lying on back to sitting on the side of the bed?: 3  Moving to and from a bed to a chair (including a wheelchair)?: 3  Need to walk in hospital room?: 2  Climbing 3-5 steps with a railing?: 1  Total Score: 14    AM-PAC Raw Score CMS G-Code Modifier Level of Impairment Assistance   6 % Total / Unable   7 - 9 CM 80 - 100% Maximal Assist   10 - 14 CL 60 - 80% Moderate Assist   15 - 19 CK 40 - 60% Moderate Assist   20 - 22 CJ 20 - 40% Minimal Assist   23 CI 1-20% SBA / CGA   24 CH 0% Independent/ Mod I     Patient left up in chair with all lines intact, call button in reach and pt's wife present.    Assessment:  Suman Hayden is a 67 y.o. male with a medical diagnosis of LVAD (left ventricular assist device) present, inserted 7/27/17. Notable increase in ambulation distance this session. However, pt continues to demo altered gait mechanics and require cueing throughout to correct. Pt also required assist of 2 to complete sit<>stand transfers this date. Pt  would continue to benefit from skilled acute PT in order to address current deficits and progress functional mobility.     Rehab identified problem list/impairments: Rehab identified problem list/impairments: weakness, impaired functional mobilty, decreased safety awareness, impaired cardiopulmonary response to activity, impaired endurance, gait instability, pain, impaired balance, impaired self care skills    Rehab potential is good.    Activity tolerance: Good    Discharge recommendations: Discharge Facility/Level Of Care Needs: rehabilitation facility     Barriers to discharge: Barriers to Discharge: Decreased caregiver support (at current functional level )    Equipment recommendations: Equipment Needed After Discharge:  (TBD)     GOALS:    Physical Therapy Goals        Problem: Physical Therapy Goal    Goal Priority Disciplines Outcome Goal Variances Interventions   Physical Therapy Goal     PT/OT, PT Ongoing (interventions implemented as appropriate)     Description:  Goals to be met by: 17     Patient will increase functional independence with mobility by performin. Supine to sit with MInimal Assistance-  Met 2017  2. Sit to supine with MInimal Assistance - not met  3. Sit to stand transfer with Minimal Assistance- not met  4. Bed to chair transfer with Minimal Assistance- not met  5. Gait  x 50 feet with Minimal Assistance. -  Met 2017  6. Lower extremity exercise program x15 reps, with supervision, in order to increase LE strength and (I) with functional mobility. - not met  7.Pt mod I supine to sit- not met  8. Pt receive gait training ~ 220 ft with AD if needed and supervision- not met                          PLAN:    Patient to be seen 6 x/week  to address the above listed problems via gait training, therapeutic activities, therapeutic exercises  Plan of Care expires: 17  Plan of Care reviewed with: patient, spouse        Kely Brennon, PT, DPT   2017  [phone]

## 2017-08-30 NOTE — PROGRESS NOTES
Daily E and M and VAD Interrogation Note    Reason for Visit:    Reason for Visit:  Patient is seen in follow up for management        [] ECMO           [x] Other - HM III     Interval History:  [] Interval history unobtainable due to intubation.  The [x] implant/[] explant date was 7/27/17     Events -  NAEON.   Drinking boost without issues.  Still not taking solids well.         Review of Systems:   Negative except as above.     Medications:  Current Facility-Administered Medications   Medication Dose Route Frequency Provider Last Rate Last Dose    acetaminophen tablet 650 mg  650 mg Oral Once Andres Uriarte MD        albumin human 5% bottle 500 mL  500 mL Intravenous PRN Shayne Espinoza MD   500 mL at 08/09/17 0700    albuterol nebulizer solution 2.5 mg  2.5 mg Nebulization Q4H PRN Shayne Espinoza MD        amlodipine tablet 2.5 mg  2.5 mg Oral Daily Andres Uriaret MD   2.5 mg at 08/30/17 0827    bisacodyl suppository 10 mg  10 mg Rectal Daily PRN Shayne Espinoza MD   10 mg at 08/06/17 2036    dextrose 50% injection 12.5 g  12.5 g Intravenous PRN Charbel Ritter MD   12.5 g at 08/30/17 0449    DOBUtamine 1000 mg in D5W 250 mL infusion (premix non-titrating)  5 mcg/kg/min (Dosing Weight) Intravenous Continuous Sunny Downing MD 6 mL/hr at 08/30/17 1303 5 mcg/kg/min at 08/30/17 1303    ertapenem (INVANZ) 1 g in sodium chloride 0.9 % 100 mL IVPB (ready to mix system)  1 g Intravenous Q24H Susannah Elizabeth PA-C 200 mL/hr at 08/29/17 2138 1 g at 08/29/17 2138    glucagon (human recombinant) injection 1 mg  1 mg Intramuscular PRN Charbel Ritter MD        hydrALAZINE injection 10 mg  10 mg Intravenous Q6H PRN Shlomo Serrato MD   10 mg at 08/29/17 0022    hydrocodone-acetaminophen 5-325mg per tablet 1 tablet  1 tablet Oral Q6H PRN Nadia Lucia NP        insulin aspart pen 0-5 Units  0-5 Units Subcutaneous Q4H PRN Charbel Ritter MD   2 Units at 08/10/17 4042     ondansetron injection 4 mg  4 mg Intravenous Q6H PRN Shayne Espinoza MD   4 mg at 08/28/17 0743    pantoprazole injection 40 mg  40 mg Intravenous BID Nadia Lucia NP   40 mg at 08/30/17 0828    sodium chloride 0.9% flush 10 mL  10 mL Intravenous Q6H Sunny Downing MD   10 mL at 08/30/17 1200    And    sodium chloride 0.9% flush 10 mL  10 mL Intravenous PRN Sunny Downing MD   10 mL at 08/10/17 1151    sodium chloride 0.9% flush 10 mL  10 mL Intravenous Q6H Sunny Downing MD   10 mL at 08/30/17 1200    And    sodium chloride 0.9% flush 10 mL  10 mL Intravenous PRN Sunny Downing MD        warfarin (COUMADIN) tablet 3 mg  3 mg Oral Daily Nadia Lucia NP           Physical Examination:  Vital Signs:   Vitals:    08/30/17 1100   BP:    Pulse: (!) 58   Resp:    Temp:      Cardiovascular:  [x] Regular rate and rhythm  []  Other  [x]  No edema []  Edema present  [x]  Clear to auscultation  VAD sounds smooth  Skin:  Incision is [x]  Clean, dry and intact.    Sternum:  [x]  Stable []  Unstable  Driveline(s):   [x]  Clean, dry and intact.       Labs:  CBC, BMP, LDH, INR reviewed      Procedure:  Device Interrogation including analysis of device parameters.  Current Settings   [x]  Ventricular Assist Device   i  Review of device function is [x]  Stable     TXP LVAD INTERROGATIONS 8/30/2017 8/30/2017 8/30/2017 8/29/2017 8/29/2017 8/29/2017 8/29/2017   Type HeartMate3 (No Data) HeartMate3 HeartMate3 HeartMate II;HeartMate3 HeartMate3 HeartMate3   Flow 4.3 - 4.2 4.4 4.2 4.4 4.3   Speed 5200 - 5200 5200 5200 5200 5200   PI 3.5 - 3.4 3.6 3.6 3.5 3.4   Power ([place]) 3.6 - 3.6 3.6 3.6 3.6 3.7   LSL - - - - - - -   Pulsatility - - - - - - -   Some recent data might be hidden       Assessment:  [x]  Primary Cardiomyopathy [x]  Congestive Heart Failure   []  Atrial Fibrillation []  Ventricular Tachycardia   []  Aftercare cardiac device [x]  Long term (current) use of anticoagulants   []  Ventilator-associated  pneumonia []  Pneumonia viral, unspecified   []  Pneumonia, bacterial, unspecified []  Pneumonia, organism unspecified   []  Hemorrhage of GI tract, unspecified    []  Nosebleed []  Driveline infection    [x]  Decubitus/sacral           Plan:  [x]  Interval history obtained from ICU attending team member during rounding today  [x]  VAD/MATT teaching performed with patient  [x]  Mobilization / Physical Therapy ongoing  [x]  Anticoagulation [x]  Ongoing []  Held    Decrease coumadin to 3 mg daily  Calorie counts  Wound care consult  Specialty overlay bed  Continue inotropic support    Total time spent was 32 minutes.  Of which more than 50 percent of the care dominated counseling and coordinating care with different team members. The VAD was interrogated and all parameters were WNL and no significant findings were found in the history. All these findings are documented in the note above.      Date of Service: 08/30/2017       Cardiac Surgery Attending E and M (VAD) Note along with VAD Interrogation    I have seen and examined the patient and agree with the findings above    I also reviewed the patients clinical course and:  [x]  Hemodynamic & Respiratory paramters  [x]  Laboratory Data  [x]  Radiological studies     VAD Interrogated [x]      VAD Function is normal. Changes made []  None [x]        Interrogation of Ventricular assist device was performed with physician analysis of device parameters and review of device function. I have personally reviewed the interrogation findings and agree with findings as stated

## 2017-08-30 NOTE — PROGRESS NOTES
[facility]  Heart Transplant  Progress Note    Patient Name: Suman Hayden  MRN: [id number]  Admission Date: 7/18/2017  Hospital Length of Stay: 43 days  Attending Physician: Sunny Downing MD  Primary Care Provider: Joe Ernst MD  Principal Problem:LVAD (left ventricular assist device) present    Subjective:     Interval History: Appetite and nausea better. He was able to drink a whole Boost and part of his milk this morning    Continuous Infusions:   DOBUTamine 5 mcg/kg/min (08/30/17 1303)     Scheduled Meds:   acetaminophen  650 mg Oral Once    amlodipine  2.5 mg Oral Daily    ertapenem (INVANZ) IVPB  1 g Intravenous Q24H    pantoprazole  40 mg Intravenous BID    sodium chloride 0.9%  10 mL Intravenous Q6H    sodium chloride 0.9%  10 mL Intravenous Q6H    warfarin  3 mg Oral Daily     PRN Meds:albumin human 5%, albuterol sulfate, bisacodyl, dextrose 50%, glucagon (human recombinant), hydrALAZINE, hydrocodone-acetaminophen 5-325mg, insulin aspart, ondansetron, [CANCELED] Flushing PICC Protocol **AND** sodium chloride 0.9% **AND** sodium chloride 0.9%, Flushing PICC Protocol **AND** sodium chloride 0.9% **AND** sodium chloride 0.9%    Review of patient's allergies indicates:  No Known Allergies  Objective:     Vital Signs (Most Recent):  Temp: 98.8 °F (37.1 °C) (08/30/17 1200)  Pulse: 80 (08/30/17 1200)  Resp: 18 (08/30/17 1200)  BP: (!) 78/0 (08/30/17 1200)  SpO2: (!) 94 % (08/30/17 1200) Vital Signs (24h Range):  Temp:  [98.4 °F (36.9 °C)-98.9 °F (37.2 °C)] 98.8 °F (37.1 °C)  Pulse:  [56-80] 80  Resp:  [18-20] 18  SpO2:  [94 %-97 %] 94 %  BP: ()/(0-66) 78/0     Weight: 74.1 kg (163 lb 5.8 oz)  Body mass index is 24.84 kg/m².      Intake/Output Summary (Last 24 hours) at 08/30/17 1427  Last data filed at 08/30/17 0600   Gross per 24 hour   Intake             1232 ml   Output              575 ml   Net              657 ml     Physical Exam     Constitutional: He appears  well-developed and well-nourished.   HENT:   Head: Normocephalic and atraumatic.   Eyes: EOM are normal. Pupils are equal, round, and reactive to light.   Cardiovascular: Normal rate, regular rhythm and normal heart sounds.  Exam reveals no gallop and no friction rub.    No murmur heard.  + LVAD hum    Pulmonary/Chest: Effort normal. No respiratory distress. He has no wheezes. He has no rales.   Abdominal: Soft. Bowel sounds are normal.   Musculoskeletal: He exhibits no edema.   Neurological: He is alert.   Nursing note and vitals reviewed      Significant Labs:  CBC:    Recent Labs  Lab 08/30/17  0320   WBC 13.75*   RBC 2.80*   HGB 7.8*   HCT 25.8*      MCV 92   MCH 27.9   MCHC 30.2*     BNP:    Recent Labs  Lab 08/30/17  0320   BNP 2,643*     CMP:    Recent Labs  Lab 08/30/17  0320   GLU 65*   CALCIUM 8.8   ALBUMIN 2.2*   PROT 6.9   *   K 3.7   CO2 34*      BUN 67*   CREATININE 1.8*   ALKPHOS 151*   ALT 20   AST 28   BILITOT 1.6*      Coagulation:     Recent Labs  Lab 08/30/17  0320   INR 3.0*   APTT 33.1*     LDH:    Recent Labs  Lab 08/28/17  0402 08/29/17  0520 08/30/17  0320   * 311* 326*     Microbiology:  Microbiology Results (last 7 days)     Procedure Component Value Units Date/Time    Blood culture [[id number]] Collected:  08/29/17 0836    Order Status:  Completed Specimen:  Blood from Line, Jugular, Internal Right Updated:  08/30/17 1022     Blood Culture, Routine No Growth to date     Blood Culture, Routine No Growth to date    Narrative:       FromTrialysis,prior to removing line.          Estimated Creatinine Clearance: 38.5 mL/min (based on Cr of 1.8).        Assessment and Plan:     * LVAD (left ventricular assist device) present    - LVAD HM III. Placed this admit 7/27/17  - CTS Primary  - chest closure (7/28/17) and extubated (7/29/17), reintubated 8/9/17 and extubated 8/13/17  -SPeed 5200  -  @5mcg/kg/min.  -Now off Lasix. CVP this am 10           Bacteremia    - ESBL  bacteremia.Cont ertapenem. Per ID- continue ertapenem x 4-6 weeks from 8/11. If discharged on ertapenem needs weekly cbc and cmp while on antibiotics (please fax results to ID clinic)          Upper GI bleed    - GI following peripherally. Hgb stable.   - Continue Protonix IV        Atrial fibrillation    -AC, Amio per C TS          Hypernatremia    -Nephrology recommending D5W drip.         Atrial tachycardia    - [id number] - 3  -Rec restarting Amio. AC per CTS        AICD discharge    - Appropriate in the setting of VT aggravated by underlying AT/AFL (earlier during the admission). Device reprogrammed to VVI 80 this admission          Hepatitis B core antibody positive since 2012              V-tach    - Recommend resuming Amio        Hyperlipidemia    - Please resume pravastatin once liver enzymes stabilize             Susannah Elizabeth PA-C  Heart Transplant  [facility]

## 2017-08-30 NOTE — PLAN OF CARE
Problem: Patient Care Overview  Goal: Plan of Care Review  Discussed Plan of Care with patient. VS stable. Ambulates with 2person assist and platform walker. Fall precautions in place. Sternal precautions reviewed. Bleeding precautions reviewed and maintained. Pain denied. TPN and   Lipids continued. Nutrition education provided and Boosts x3-4 encouraged. Dobutamine continued. Heparin and Lasix IV gtts discontinued. VAD educ provided. Drsg change to be performed by wife and supervised by mejia RN. Patient remained free of falls/ injury. All questions and concerns were addressed. Will continue to monitor patient.

## 2017-08-30 NOTE — PT/OT/SLP PROGRESS
"Speech Language Pathology  Treatment    Suman Hayden   MRN: [id number]   CTSU 312/CTSU 312 A    Admitting Diagnosis: LVAD (left ventricular assist device) present    Diet recommendations: Solid Diet Level: Dental Soft  Liquid Diet Level: Thin Feed only when awake/alert, HOB to 90 degrees, Small bites/sips, 1 bite/sip at a time, Remain upright 30 minutes post meal and Meds whole 1 at a time    SLP Treatment Date: 08/30/17  Speech Start Time: 1202     Speech Stop Time: 1214     Speech Total (min): 12 min       TREATMENT BILLABLE MINUTES:  Treatment Swallowing Dysfunction 12    Has the patient been evaluated by SLP for swallowing? : Yes  Keep patient NPO?: No   General Precautions: Standard, sternal, LVAD, fall, aspiration  Current Respiratory Status: nasal cannula       Subjective:  Pt awake and seated in chair. Lunch tray present in pt's room.          Objective:     Noted previous SLP recs for a pureed diet and current diet order to be a cardiac regular diet. Pt reported he no longer has difficulty swallowing. Pt reported everything "goes down okay," but he regurgitates everything after a delay. Pt's wife reported pt kept a banana down for ~30 minutes. Pt and wife report pt typically eats without dentures with no difficulties.   Pt assessed with multiple self regulated sips of entire Boost and 1 tsp bite of mashed sweet potatoes with no overt s/s of aspiration or difficulty swallowing noted. Pt refusing solid trials at this time. SLP provided education on the s/s and risks of aspiration and SLP POC to follow up with solid consistencies as tolerated by patient. Pt and pt's wife verbalized understanding of all discussed. Noted calorie count and notified RN pt drank a Boost.    Assessment:  Suman Hayden is a 67 y.o. male with a medical diagnosis of LVAD (left ventricular assist device) present and presents with Dysphagia. ST will continue to follow to assess tolerance of solid consistencies.     Discharge recommendations: " Discharge Facility/Level Of Care Needs: rehabilitation facility     Goals:    SLP Goals        Problem: SLP Goal    Goal Priority Disciplines Outcome   SLP Goal     SLP Ongoing (interventions implemented as appropriate)   Description:  Speech Language Pathology Goals  Goals expected to be met by 9/6   1. Pt will tolerate thin liquids with no overt s/s of aspiration.   2. Pt will tolerate dental soft diet with adequate a-p transit and no overt s/s of aspiration                     Multidisciplinary Problems (Resolved)        Problem: SLP Goal    Goal Priority Disciplines Outcome   SLP Goal   (Resolved)     SLP Outcome(s) achieved   Description:  Speech Language Pathology Goals  Goals expected to be met by 8/4:  1. Pt will tolerate a dental soft diet and thin liquids without s/s of aspiration.                          Plan:   Patient to be seen Therapy Frequency: 5 x/week   Plan of Care expires: 09/12/17  Plan of Care reviewed with: patient, spouse  SLP Follow-up?: Yes              SHERRI Yañez, CCC-SLP   Pager: [phone]  08/30/2017

## 2017-08-30 NOTE — PROGRESS NOTES
Consulted to see for sacral skin      08/30/17 1400       Pressure Ulcer 08/10/17 0300 midline sacral spine Stage II   Date First Assessed/Time First Assessed: 08/10/17 0300   Pressure Ulcer Present on Admission: (c) other (see comments)  Orientation: midline  Location: sacral spine  Staging: Stage II  Wound Length (cm): 2  Wound Width (cm): 2   Wound Image    Staging Stage III   Healing Pressure Ulcer no   Pressure Ulcer Risk Factors activity;mobility;nutrition;shear/friction;moisture   Dressing Appearance no dressing   Drainage Amount none   Appearance slough;yellow   Periwound Area pink  (scar tissue)   Wound Edges open   Wound Length (cm) 1.5   Wound Width (cm) 0.8   Depth (cm) 0.2  (1.3x0.5x0.1 & 0.5x0.5x0.2 with slough)   Cleansed W/ sterile normal saline   Interventions barrier applied     Recommendations:   1. Low air loss bed to be applied  2. chair cushion in use  3. Critic aid paste in use  3. Nutritional already ordered , but will order Ismael  Wound supplement. As discussed with   Nadia Nguyen RN CWON  [id number]

## 2017-08-30 NOTE — PT/OT/SLP PROGRESS
Occupational Therapy  Treatment    Suman Hayden   MRN: [id number]   Admitting Diagnosis: LVAD (left ventricular assist device) present    OT Date of Treatment: 08/30/17   OT Start Time: 1030  OT Stop Time: 1111  OT Total Time (min): 41 min (+20 min in PM)    Billable Minutes:  Self Care/Home Management 35 and Therapeutic Activity 26    General Precautions: Standard, LVAD, fall, sternal, aspiration  Orthopedic Precautions: N/A  Braces: N/A    Subjective:  Communicated with RN prior to session. Pt agreeable to OT.    Pain/Comfort  Pain Rating 1: 6/10  Location - Side 1: Left  Location 1: flank  Pain Addressed 1: Reposition, Distraction  Pain Rating Post-Intervention 1: 6/10    Objective:  Patient found with: PICC line, telemetry (LVAD to wall power)     Functional Mobility:  Bed Mobility:  Sit to Supine: Minimum Assistance at trunk    Transfers:  Sit <> Stand Assistance: Maximum Assistance from EOB; cues to maintain sternal preacautions  Sit <> Stand Assistive Device: No Assistive Device  Bed <> Chair Technique: Stand Pivot  Bed <> Chair Transfer Assistance: Maximum Assistance  Bed <> Chair Assistive Device: No Assistive Device    Functional Ambulation: Few steps from EOB to bedside chair with Max A HHA    Activities of Daily Living:  UE Dressing Level of Assistance: Maximum assistance to don gown around back and LVAD shld consolidation bag  Toileting Where Assessed: Other (Comment) (seated EOB)  Toileting Level of Assistance: Stand by assistance to use urinal EOB    Balance:   Static Sit: GOOD+: Takes MAXIMAL challenges from all directions.    Dynamic Sit: GOOD+: Maintains balance through MAXIMAL excursions of active trunk motion  Static Stand: POOR: Needs MODERATE assist to maintain  Dynamic stand: POOR: N/A    Therapeutic Activities and Exercises:  Pt supine in bed upon OT entry, wife present; Min A for bed mobility; sat EOB with SBA while toileting and to perform LVAD management; required Max A to switch LVAD to  "battery power due to UE tremors and weakness and difficulty sequencing steps/remembering parts; pt with little carry-over from yesterday regarding LVAD management; able to perform self-test and read numbers out loud to wife who recorded in binder; performed stand pivot to bedside chair with Max A; returned in PM to assist PT with gait and pt required Max A x 2 person assist to stand from bedside chair x 3 trials; pt required cues and reminders to maintain sternal precautions during T/Fs    AM-PAC 6 CLICK ADL   How much help from another person does this patient currently need?   1 = Unable, Total/Dependent Assistance  2 = A lot, Maximum/Moderate Assistance  3 = A little, Minimum/Contact Guard/Supervision  4 = None, Modified [place]/Independent    Putting on and taking off regular lower body clothing? : 2  Bathing (including washing, rinsing, drying)?: 2  Toileting, which includes using toilet, bedpan, or urinal? : 2  Putting on and taking off regular upper body clothing?: 2  Taking care of personal grooming such as brushing teeth?: 3  Eating meals?: 3  Total Score: 14     AM-PAC Raw Score CMS "G-Code Modifier Level of Impairment Assistance   6 % Total / Unable   7 - 8 CM 80 - 100% Maximal Assist   9-13 CL 60 - 80% Moderate Assist   14 - 19 CK 40 - 60% Moderate Assist   20 - 22 CJ 20 - 40% Minimal Assist   23 CI 1-20% SBA / CGA   24 CH 0% Independent/ Mod I       Patient left up in chair with all lines intact, call button in reach and wife present    ASSESSMENT:  Suman Hayden is a 67 y.o. male with a medical diagnosis of LVAD (left ventricular assist device) present and presents with good effort and participation in therapy. Pt motivated and making progress toward goals and would continue to benefit from OT to increase independence with ADLs, functional mobility, and LVAD management. Recommend rehab upon D/C.    Rehab identified problem list/impairments: Rehab identified problem list/impairments: weakness, " impaired endurance, impaired self care skills, impaired functional mobilty, gait instability, pain, decreased safety awareness, impaired cardiopulmonary response to activity    Rehab potential is good.    Activity tolerance: Good    Discharge recommendations: Discharge Facility/Level Of Care Needs: rehabilitation facility     Barriers to discharge: Barriers to Discharge: Decreased caregiver support (at current functional level)    Equipment recommendations:  (TBD)     GOALS:    Occupational Therapy Goals        Problem: Occupational Therapy Goal    Goal Priority Disciplines Outcome Interventions   Occupational Therapy Goal     OT, PT/OT Ongoing (interventions implemented as appropriate)    Description:  Goals to be met by:  2 weeks 9/12/17    Patient will increase functional independence with ADLs by performing:  Feeding: Independent   UE Dressing with Supervision.  LE Dressing with Supervision.  Grooming while standing with Supervision.  Toileting from toilet with Supervision for hygiene and clothing management.   Stand pivot transfers with Supervision.  Toilet transfer to toilet with Supervision.  Pt will be supervision  with LVAD yasmin't.     Goals to be met by:  2 weeks 8/28/17    Patient will increase functional independence with ADLs by performing:  Feeding: Independent   UE Dressing with Supervision.  LE Dressing with Supervision.  Grooming while standing with Supervision.  Toileting from toilet with Supervision for hygiene and clothing management.   Stand pivot transfers with Supervision.  Toilet transfer to toilet with Supervision.  Pt will be supervision  with LVAD yasmin't.                   Multidisciplinary Problems (Resolved)        Problem: Occupational Therapy Goal    Goal Priority Disciplines Outcome Interventions   Occupational Therapy Goal   (Resolved)     OT, PT/OT  Error    Description:  Goals to be met by: 8/04/2017    Patient will increase functional independence with ADLs by  performing:    Increased functional strength to 5/5 for improved ADL performance.  Upper extremity exercise program and R LE ankle pumps  3x 10 reps per handout, with independence.                      Plan:  Patient to be seen 6 x/week to address the above listed problems via self-care/home management, therapeutic activities, therapeutic exercises  Plan of Care expires: 08/29/17  Plan of Care reviewed with: patient, spouse         DELMY Mohamud  08/30/2017

## 2017-08-30 NOTE — ASSESSMENT & PLAN NOTE
- LVAD HM III. Placed this admit 7/27/17  - CTS Primary  - chest closure (7/28/17) and extubated (7/29/17), reintubated 8/9/17 and extubated 8/13/17  -SPeed 5200  -  @5mcg/kg/min.  -Now off Lasix. CVP this am 10

## 2017-08-30 NOTE — PROGRESS NOTES
UPDATE    SW to pt's room for update. Pt presents as sleeping. Pt's wife at bedside. Pt reports she and pt are coping well with no needs at this time. Pt's wife reports she is encouraged by pt's progress and feels that he is doing really well. SW providing psychosocial and counseling support, education, resources, and d/c planning as needed. SW continuing to follow and remains available.

## 2017-08-30 NOTE — SUBJECTIVE & OBJECTIVE
Interval History: Appetite and nausea better. He was able to drink a whole Boost and part of his milk this morning    Continuous Infusions:   DOBUTamine 5 mcg/kg/min (08/30/17 1303)     Scheduled Meds:   acetaminophen  650 mg Oral Once    amlodipine  2.5 mg Oral Daily    ertapenem (INVANZ) IVPB  1 g Intravenous Q24H    pantoprazole  40 mg Intravenous BID    sodium chloride 0.9%  10 mL Intravenous Q6H    sodium chloride 0.9%  10 mL Intravenous Q6H    warfarin  3 mg Oral Daily     PRN Meds:albumin human 5%, albuterol sulfate, bisacodyl, dextrose 50%, glucagon (human recombinant), hydrALAZINE, hydrocodone-acetaminophen 5-325mg, insulin aspart, ondansetron, [CANCELED] Flushing PICC Protocol **AND** sodium chloride 0.9% **AND** sodium chloride 0.9%, Flushing PICC Protocol **AND** sodium chloride 0.9% **AND** sodium chloride 0.9%    Review of patient's allergies indicates:  No Known Allergies  Objective:     Vital Signs (Most Recent):  Temp: 98.8 °F (37.1 °C) (08/30/17 1200)  Pulse: 80 (08/30/17 1200)  Resp: 18 (08/30/17 1200)  BP: (!) 78/0 (08/30/17 1200)  SpO2: (!) 94 % (08/30/17 1200) Vital Signs (24h Range):  Temp:  [98.4 °F (36.9 °C)-98.9 °F (37.2 °C)] 98.8 °F (37.1 °C)  Pulse:  [56-80] 80  Resp:  [18-20] 18  SpO2:  [94 %-97 %] 94 %  BP: ()/(0-66) 78/0     Weight: 74.1 kg (163 lb 5.8 oz)  Body mass index is 24.84 kg/m².      Intake/Output Summary (Last 24 hours) at 08/30/17 1427  Last data filed at 08/30/17 0600   Gross per 24 hour   Intake             1232 ml   Output              575 ml   Net              657 ml     Physical Exam     Constitutional: He appears well-developed and well-nourished.   HENT:   Head: Normocephalic and atraumatic.   Eyes: EOM are normal. Pupils are equal, round, and reactive to light.   Cardiovascular: Normal rate, regular rhythm and normal heart sounds.  Exam reveals no gallop and no friction rub.    No murmur heard.  + LVAD hum    Pulmonary/Chest: Effort normal. No  respiratory distress. He has no wheezes. He has no rales.   Abdominal: Soft. Bowel sounds are normal.   Musculoskeletal: He exhibits no edema.   Neurological: He is alert.   Nursing note and vitals reviewed      Significant Labs:  CBC:    Recent Labs  Lab 08/30/17  0320   WBC 13.75*   RBC 2.80*   HGB 7.8*   HCT 25.8*      MCV 92   MCH 27.9   MCHC 30.2*     BNP:    Recent Labs  Lab 08/30/17  0320   BNP 2,643*     CMP:    Recent Labs  Lab 08/30/17  0320   GLU 65*   CALCIUM 8.8   ALBUMIN 2.2*   PROT 6.9   *   K 3.7   CO2 34*      BUN 67*   CREATININE 1.8*   ALKPHOS 151*   ALT 20   AST 28   BILITOT 1.6*      Coagulation:     Recent Labs  Lab 08/30/17  0320   INR 3.0*   APTT 33.1*     LDH:    Recent Labs  Lab 08/28/17  0402 08/29/17  0520 08/30/17  0320   * 311* 326*     Microbiology:  Microbiology Results (last 7 days)     Procedure Component Value Units Date/Time    Blood culture [[id number]] Collected:  08/29/17 0836    Order Status:  Completed Specimen:  Blood from Line, Jugular, Internal Right Updated:  08/30/17 1022     Blood Culture, Routine No Growth to date     Blood Culture, Routine No Growth to date    Narrative:       FromTrialysis,prior to removing line.          Estimated Creatinine Clearance: 38.5 mL/min (based on Cr of 1.8).

## 2017-08-30 NOTE — PLAN OF CARE
Problem: Patient Care Overview  Goal: Plan of Care Review  Outcome: Ongoing (interventions implemented as appropriate)  POC reviewed with patient and spouse with both verbalizing understanding. VSS and VAD numbers WNL. No alarms noted on VAD monitor. VAD dressing dry, clean, intact and due for change today with soap and water by spouse under RN supervision. Wife says she was checked off on dressing change in ICU but no noted found confirming. Spouse and patient continue to work on VAD workbook and watch VAD DVD's. Wound care consulted for stage 2 pressure ulcer on patient's buttocks. RN turning patient q2 to prevent further skin breakdown. Patient up in chair for all meals and staff encouraging patient to ambulate 4 times a shift. Blood sugar checked before all meals with PRN insulin ordered. RN encouraging patient to increase nutritional intake with boosts ordered for every meal. Patient in no apparent sign of distress, will continue to monitor.

## 2017-08-30 NOTE — PROGRESS NOTES
Spouse did a VAD dsg change with soap and water with  RN observation. Sterile precautions maintained. Had to remind her frequently  to maintain gloves sterile while dsg change. Need couple more observation with VAD dsg change. Stage II DLES site with minimal drainage in the dsg gauze. DLES secured with securement. Sutures intact.

## 2017-08-30 NOTE — NURSING
"LVAD dressing change preformed by spouse under RN supervision. Spouse broke sterile field one time but was aware and caught mistake. RN had to inform patient gauze container holding soap also required water. Spouse also needed correction on "snaking" gauze under drive line. Spouse needs more dressing changes under  RN supervision.   "

## 2017-08-30 NOTE — ASSESSMENT & PLAN NOTE
- ESBL bacteremia.Cont ertapenem. Per ID- continue ertapenem x 4-6 weeks from 8/11. If discharged on ertapenem needs weekly cbc and cmp while on antibiotics (please fax results to ID clinic)

## 2017-08-30 NOTE — PLAN OF CARE
Problem: SLP Goal  Goal: SLP Goal  Speech Language Pathology Goals  Goals expected to be met by 9/6  1. Pt will tolerate thin liquids with no overt s/s of aspiration.   2. Pt will tolerate dental soft diet with adequate a-p transit and no overt s/s of aspiration         Outcome: Ongoing (interventions implemented as appropriate)  Pt refusing solid trials this date. Pt tolerating thin liquids with no overt s/s of aspiration. ST will continue to follow.   MARIANO Up., CCC-SLP  Pager: [phone]  08/30/2017

## 2017-08-30 NOTE — PLAN OF CARE
Problem: Physical Therapy Goal  Goal: Physical Therapy Goal  Goals to be met by: 17     Patient will increase functional independence with mobility by performin. Supine to sit with MInimal Assistance-  Met 2017  2. Sit to supine with MInimal Assistance - not met  3. Sit to stand transfer with Minimal Assistance- not met  4. Bed to chair transfer with Minimal Assistance- not met  5. Gait  x 50 feet with Minimal Assistance. -  Met 2017  6. Lower extremity exercise program x15 reps, with supervision, in order to increase LE strength and (I) with functional mobility. - not met  7.Pt mod I supine to sit- not met  8. Pt receive gait training ~ 220 ft with AD if needed and supervision- not met         Outcome: Ongoing (interventions implemented as appropriate)  Goals reviewed and remain appropriate. Pt progressing towards goals.    Kely Willett, PT, DPT   2017  [phone]

## 2017-08-31 PROBLEM — K92.2 UPPER GI BLEED: Status: RESOLVED | Noted: 2017-08-18 | Resolved: 2017-08-31

## 2017-08-31 LAB
ANION GAP SERPL CALC-SCNC: 12 MMOL/L
ANION GAP SERPL CALC-SCNC: 9 MMOL/L
APTT BLDCRRT: 37 SEC
BASOPHILS # BLD AUTO: 0.08 K/UL
BASOPHILS NFR BLD: 0.6 %
BUN SERPL-MCNC: 50 MG/DL
BUN SERPL-MCNC: 53 MG/DL
CALCIUM SERPL-MCNC: 8.6 MG/DL
CALCIUM SERPL-MCNC: 8.7 MG/DL
CHLORIDE SERPL-SCNC: 113 MMOL/L
CHLORIDE SERPL-SCNC: 114 MMOL/L
CO2 SERPL-SCNC: 30 MMOL/L
CO2 SERPL-SCNC: 31 MMOL/L
CREAT SERPL-MCNC: 1.7 MG/DL
CREAT SERPL-MCNC: 1.8 MG/DL
DIFFERENTIAL METHOD: ABNORMAL
EOSINOPHIL # BLD AUTO: 0.4 K/UL
EOSINOPHIL NFR BLD: 3.5 %
ERYTHROCYTE [DISTWIDTH] IN BLOOD BY AUTOMATED COUNT: 17.8 %
EST. GFR  (AFRICAN AMERICAN): 44 ML/MIN/1.73 M^2
EST. GFR  (AFRICAN AMERICAN): 47.2 ML/MIN/1.73 M^2
EST. GFR  (NON AFRICAN AMERICAN): 38.1 ML/MIN/1.73 M^2
EST. GFR  (NON AFRICAN AMERICAN): 40.8 ML/MIN/1.73 M^2
GLUCOSE SERPL-MCNC: 60 MG/DL
GLUCOSE SERPL-MCNC: 96 MG/DL
HCT VFR BLD AUTO: 25.7 %
HGB BLD-MCNC: 7.6 G/DL
INR PPP: 4.4
LDH SERPL L TO P-CCNC: 278 U/L
LYMPHOCYTES # BLD AUTO: 1.8 K/UL
LYMPHOCYTES NFR BLD: 14.4 %
MAGNESIUM SERPL-MCNC: 1.9 MG/DL
MCH RBC QN AUTO: 27.8 PG
MCHC RBC AUTO-ENTMCNC: 29.6 G/DL
MCV RBC AUTO: 94 FL
MONOCYTES # BLD AUTO: 0.8 K/UL
MONOCYTES NFR BLD: 6.6 %
NEUTROPHILS # BLD AUTO: 9.4 K/UL
NEUTROPHILS NFR BLD: 74.6 %
PHOSPHATE SERPL-MCNC: 3.6 MG/DL
PLATELET # BLD AUTO: 260 K/UL
PMV BLD AUTO: 11.9 FL
POCT GLUCOSE: 107 MG/DL (ref 70–110)
POCT GLUCOSE: 67 MG/DL (ref 70–110)
POCT GLUCOSE: 73 MG/DL (ref 70–110)
POCT GLUCOSE: 74 MG/DL (ref 70–110)
POCT GLUCOSE: 77 MG/DL (ref 70–110)
POCT GLUCOSE: 81 MG/DL (ref 70–110)
POCT GLUCOSE: 91 MG/DL (ref 70–110)
POTASSIUM SERPL-SCNC: 3.4 MMOL/L
POTASSIUM SERPL-SCNC: 3.9 MMOL/L
PROTHROMBIN TIME: 44.3 SEC
RBC # BLD AUTO: 2.73 M/UL
SODIUM SERPL-SCNC: 153 MMOL/L
SODIUM SERPL-SCNC: 156 MMOL/L
WBC # BLD AUTO: 12.65 K/UL

## 2017-08-31 PROCEDURE — 25000003 PHARM REV CODE 250: Performed by: NURSE PRACTITIONER

## 2017-08-31 PROCEDURE — 63600175 PHARM REV CODE 636 W HCPCS: Performed by: NURSE PRACTITIONER

## 2017-08-31 PROCEDURE — 80048 BASIC METABOLIC PNL TOTAL CA: CPT | Mod: 91

## 2017-08-31 PROCEDURE — 80048 BASIC METABOLIC PNL TOTAL CA: CPT

## 2017-08-31 PROCEDURE — 97803 MED NUTRITION INDIV SUBSEQ: CPT

## 2017-08-31 PROCEDURE — 25000003 PHARM REV CODE 250: Performed by: STUDENT IN AN ORGANIZED HEALTH CARE EDUCATION/TRAINING PROGRAM

## 2017-08-31 PROCEDURE — 84100 ASSAY OF PHOSPHORUS: CPT

## 2017-08-31 PROCEDURE — 83735 ASSAY OF MAGNESIUM: CPT

## 2017-08-31 PROCEDURE — 27000248 HC VAD-ADDITIONAL DAY

## 2017-08-31 PROCEDURE — 85730 THROMBOPLASTIN TIME PARTIAL: CPT

## 2017-08-31 PROCEDURE — 85025 COMPLETE CBC W/AUTO DIFF WBC: CPT

## 2017-08-31 PROCEDURE — G8998 SWALLOW D/C STATUS: HCPCS | Mod: CI

## 2017-08-31 PROCEDURE — A4216 STERILE WATER/SALINE, 10 ML: HCPCS | Performed by: THORACIC SURGERY (CARDIOTHORACIC VASCULAR SURGERY)

## 2017-08-31 PROCEDURE — 99232 SBSQ HOSP IP/OBS MODERATE 35: CPT | Mod: ,,, | Performed by: INTERNAL MEDICINE

## 2017-08-31 PROCEDURE — 97116 GAIT TRAINING THERAPY: CPT

## 2017-08-31 PROCEDURE — A4217 STERILE WATER/SALINE, 500 ML: HCPCS | Performed by: NURSE PRACTITIONER

## 2017-08-31 PROCEDURE — G8997 SWALLOW GOAL STATUS: HCPCS | Mod: CI

## 2017-08-31 PROCEDURE — G8996 SWALLOW CURRENT STATUS: HCPCS | Mod: CI

## 2017-08-31 PROCEDURE — 97535 SELF CARE MNGMENT TRAINING: CPT

## 2017-08-31 PROCEDURE — 83615 LACTATE (LD) (LDH) ENZYME: CPT

## 2017-08-31 PROCEDURE — 63600175 PHARM REV CODE 636 W HCPCS: Performed by: PHYSICIAN ASSISTANT

## 2017-08-31 PROCEDURE — 93750 INTERROGATION VAD IN PERSON: CPT | Mod: ,,, | Performed by: THORACIC SURGERY (CARDIOTHORACIC VASCULAR SURGERY)

## 2017-08-31 PROCEDURE — 85610 PROTHROMBIN TIME: CPT

## 2017-08-31 PROCEDURE — C9113 INJ PANTOPRAZOLE SODIUM, VIA: HCPCS | Performed by: NURSE PRACTITIONER

## 2017-08-31 PROCEDURE — 63600175 PHARM REV CODE 636 W HCPCS: Performed by: GENERAL PRACTICE

## 2017-08-31 PROCEDURE — 97530 THERAPEUTIC ACTIVITIES: CPT

## 2017-08-31 PROCEDURE — 25000003 PHARM REV CODE 250: Performed by: THORACIC SURGERY (CARDIOTHORACIC VASCULAR SURGERY)

## 2017-08-31 PROCEDURE — 92526 ORAL FUNCTION THERAPY: CPT

## 2017-08-31 PROCEDURE — 20600001 HC STEP DOWN PRIVATE ROOM

## 2017-08-31 PROCEDURE — 99233 SBSQ HOSP IP/OBS HIGH 50: CPT | Mod: 24,,, | Performed by: THORACIC SURGERY (CARDIOTHORACIC VASCULAR SURGERY)

## 2017-08-31 RX ORDER — DEXTROSE MONOHYDRATE 50 MG/ML
INJECTION, SOLUTION INTRAVENOUS CONTINUOUS
Status: DISCONTINUED | OUTPATIENT
Start: 2017-08-31 | End: 2017-09-04

## 2017-08-31 RX ORDER — LIDOCAINE 50 MG/G
1 PATCH TOPICAL
Status: DISCONTINUED | OUTPATIENT
Start: 2017-08-31 | End: 2017-09-19

## 2017-08-31 RX ORDER — AMLODIPINE BESYLATE 5 MG/1
5 TABLET ORAL DAILY
Status: DISCONTINUED | OUTPATIENT
Start: 2017-09-01 | End: 2017-09-02

## 2017-08-31 RX ADMIN — HYDRALAZINE HYDROCHLORIDE 10 MG: 20 INJECTION INTRAMUSCULAR; INTRAVENOUS at 09:08

## 2017-08-31 RX ADMIN — PANTOPRAZOLE SODIUM 40 MG: 40 INJECTION, POWDER, FOR SOLUTION INTRAVENOUS at 09:08

## 2017-08-31 RX ADMIN — ERTAPENEM SODIUM 1 G: 1 INJECTION, POWDER, LYOPHILIZED, FOR SOLUTION INTRAMUSCULAR; INTRAVENOUS at 08:08

## 2017-08-31 RX ADMIN — Medication 10 ML: at 05:08

## 2017-08-31 RX ADMIN — HYDRALAZINE HYDROCHLORIDE 10 MG: 20 INJECTION INTRAMUSCULAR; INTRAVENOUS at 05:08

## 2017-08-31 RX ADMIN — AMLODIPINE BESYLATE 2.5 MG: 2.5 TABLET ORAL at 09:08

## 2017-08-31 RX ADMIN — PANTOPRAZOLE SODIUM 40 MG: 40 INJECTION, POWDER, FOR SOLUTION INTRAVENOUS at 08:08

## 2017-08-31 RX ADMIN — RETINOL, ERGOCALCIFEROL, .ALPHA.-TOCOPHEROL ACETATE, DL-, PHYTONADIONE, ASCORBIC ACID, NIACINAMIDE, RIBOFLAVIN 5-PHOSPHATE SODIUM, THIAMINE HYDROCHLORIDE, PYRIDOXINE HYDROCHLORIDE, DEXPANTHENOL, BIOTIN, FOLIC ACID, AND CYANOCOBALAMIN: KIT at 09:08

## 2017-08-31 RX ADMIN — Medication 10 ML: at 12:08

## 2017-08-31 RX ADMIN — LIDOCAINE 1 PATCH: 50 PATCH TOPICAL at 12:08

## 2017-08-31 RX ADMIN — HYDROCODONE BITARTRATE AND ACETAMINOPHEN 1 TABLET: 5; 325 TABLET ORAL at 09:08

## 2017-08-31 RX ADMIN — DEXTROSE: 5 SOLUTION INTRAVENOUS at 12:08

## 2017-08-31 RX ADMIN — HYDROCODONE BITARTRATE AND ACETAMINOPHEN 1 TABLET: 5; 325 TABLET ORAL at 06:08

## 2017-08-31 NOTE — SUBJECTIVE & OBJECTIVE
Interval History: Patient ate a banana, appetite improving slowly. Wife at bedside, encouraging patient. No complaints.     Continuous Infusions:   dextrose 5 % 50 mL/hr at 08/31/17 1216    DOBUTamine 5 mcg/kg/min (08/30/17 1303)     Scheduled Meds:   [START ON 9/1/2017] amlodipine  5 mg Oral Daily    ertapenem (INVANZ) IVPB  1 g Intravenous Q24H    lidocaine  1 patch Transdermal Q24H    pantoprazole  40 mg Intravenous BID    sodium chloride 0.9%  10 mL Intravenous Q6H     PRN Meds:albumin human 5%, albuterol sulfate, bisacodyl, dextrose 50%, glucagon (human recombinant), hydrALAZINE, hydrocodone-acetaminophen 5-325mg, insulin aspart, ondansetron, Flushing PICC Protocol **AND** sodium chloride 0.9% **AND** sodium chloride 0.9%    Review of patient's allergies indicates:  No Known Allergies  Objective:     Vital Signs (Most Recent):  Temp: 97.5 °F (36.4 °C) (08/31/17 1220)  Pulse: 76 (08/31/17 1220)  Resp: 18 (08/31/17 1220)  BP: (!) 84/0 (08/31/17 1221)  SpO2: 95 % (08/31/17 0925) Vital Signs (24h Range):  Temp:  [97.5 °F (36.4 °C)-99.2 °F (37.3 °C)] 97.5 °F (36.4 °C)  Pulse:  [45-90] 76  Resp:  [16-18] 18  SpO2:  [94 %-95 %] 95 %  BP: ()/(0-81) 84/0     Weight: 74.1 kg (163 lb 5.8 oz)  Body mass index is 24.84 kg/m².      Intake/Output Summary (Last 24 hours) at 08/31/17 1323  Last data filed at 08/31/17 1210   Gross per 24 hour   Intake              670 ml   Output              950 ml   Net             -280 ml     Physical Exam   Constitutional: He appears well-developed and well-nourished.   HENT:   Head: Normocephalic and atraumatic.   Eyes: EOM are normal. Pupils are equal, round, and reactive to light.   Neck: Normal range of motion. Neck supple.   Cardiovascular: Normal rate and regular rhythm.    VAD hum smooth   Pulmonary/Chest: Effort normal and breath sounds normal.   Nursing note and vitals reviewed.    Significant Labs:  CBC:    Recent Labs  Lab 08/31/17  0420   WBC 12.65   RBC 2.73*   HGB  7.6*   HCT 25.7*      MCV 94   MCH 27.8   MCHC 29.6*     BNP:    Recent Labs  Lab 08/30/17 0320   BNP 2,643*     CMP:    Recent Labs  Lab 08/30/17 0320 08/31/17 0420   GLU 65* 60*   CALCIUM 8.8 8.6*   ALBUMIN 2.2*  --    PROT 6.9  --    * 156*   K 3.7 3.4*   CO2 34* 30*    114*   BUN 67* 53*   CREATININE 1.8* 1.7*   ALKPHOS 151*  --    ALT 20  --    AST 28  --    BILITOT 1.6*  --       Coagulation:     Recent Labs  Lab 08/31/17 0420   INR 4.4*   APTT 37.0*     LDH:    Recent Labs  Lab 08/29/17  0520 08/30/17 0320 08/31/17 0420   * 326* 278*     Microbiology:  Microbiology Results (last 7 days)     Procedure Component Value Units Date/Time    Blood culture [[id number]] Collected:  08/29/17 0836    Order Status:  Completed Specimen:  Blood from Line, Jugular, Internal Right Updated:  08/31/17 1022     Blood Culture, Routine No Growth to date     Blood Culture, Routine No Growth to date     Blood Culture, Routine No Growth to date    Narrative:       FromTrialysis,prior to removing line.          Estimated Creatinine Clearance: 40.8 mL/min (based on Cr of 1.7).

## 2017-08-31 NOTE — PLAN OF CARE
Problem: Physical Therapy Goal  Goal: Physical Therapy Goal  Goals to be met by: 17     Patient will increase functional independence with mobility by performin. Supine to sit with MInimal Assistance-  Met 2017  2. Sit to supine with MInimal Assistance - not met  3. Sit to stand transfer with Minimal Assistance- not met  4. Bed to chair transfer with Minimal Assistance- not met  5. Gait  x 50 feet with Minimal Assistance. -  Met 2017  6. Lower extremity exercise program x15 reps, with supervision, in order to increase LE strength and (I) with functional mobility. - not met  7.Pt mod I supine to sit- not met  8. Pt receive gait training ~ 220 ft with AD if needed and supervision- not met         Outcome: Ongoing (interventions implemented as appropriate)  Goals reviewed and remain appropriate. Pt progressing towards goals.    Kely Willett, PT, DPT   2017  [phone]

## 2017-08-31 NOTE — PT/OT/SLP PROGRESS
Physical Therapy  Treatment    Suman Hayden   MRN: [id number]   Admitting Diagnosis: LVAD (left ventricular assist device) present    PT Received On: 08/31/17  PT Start Time: 1115     PT Stop Time: 1204    PT Total Time (min): 49 min       Billable Minutes:  Gait [id number]    Treatment Type: Treatment  PT/PTA: PT     PTA Visit Number: 0       General Precautions: Standard, LVAD, fall, sternal, contact  Orthopedic Precautions: N/A   Braces: N/A    Do you have any cultural, spiritual, Hindu conflicts, given your current situation?: none noted     Subjective:  Communicated with RN prior to session.  Pt agreeable to therapy.   Therapy tech Nirmala present throughout     Pain/Comfort  Pain Rating 1:  (did not rate)  Location - Side 1: Left  Location 1: chest (at pump site; x1 episode during ambulation)  Pain Addressed 1: Reposition, Distraction    Objective:   Patient found with: telemetry, PICC line (LVAD to battery )    Functional Mobility:  Bed Mobility:   Supine to Sit:  (not performed 2* pt UIC before and after treatment session)    Transfers:  Sit <> Stand Assistance: Maximum Assistance (with assist of 2; x5 reps)  Sit <> Stand Assistive Device:  (Hodge IV pole)    Gait:   Gait Distance: 12 ft. + 112 ft. + 128 ft. + 60 ft. + 134 ft. = 446 ft. total (seated rest breaks between ambulation trials)  Assistance 1: Minimum assistance (for balance and assist with steering IV pole)  Gait Assistive Device:  (Marcello IV pole)  Gait Pattern: swing-through gait  Gait Deviation(s): decreased hal, increased time in double stance, excessive knee flexion, decreased velocity of limb motion, decreased step length, decreased stride length, foot flat, decreased weight-shifting ability (narrow ARVIN; decreased heel strike BLE; increased trunk flexion)   Emergency bag present throughout    Chair follow throughout with seated rests between ambulation trials   Cues for upright posture, forward gaze, and increased knee extension  provided throughout   Required Pio for balance and managing IV pole     Balance:   Static Sit: SBA  Dynamic Sit: SBA-CGA  Static Stand: CGA  Dynamic stand: Pio     Therapeutic Activities and Exercises:  Pt found on battery power with consolidation bag donned. Required maxA to adjust waist strap for proper fit.   Reviewed sternal precautions, the importance of maintaining, and how to correctly maintain during mobility. Pt verbalized understanding but needs further reinforcement.      AM-PAC 6 CLICK MOBILITY  How much help from another person does this patient currently need?   1 = Unable, Total/Dependent Assistance  2 = A lot, Maximum/Moderate Assistance  3 = A little, Minimum/Contact Guard/Supervision  4 = None, Modified [place]/Independent    Turning over in bed (including adjusting bedclothes, sheets and blankets)?: 3  Sitting down on and standing up from a chair with arms (e.g., wheelchair, bedside commode, etc.): 2  Moving from lying on back to sitting on the side of the bed?: 3  Moving to and from a bed to a chair (including a wheelchair)?: 3  Need to walk in hospital room?: 3  Climbing 3-5 steps with a railing?: 1  Total Score: 15    AM-PAC Raw Score CMS G-Code Modifier Level of Impairment Assistance   6 % Total / Unable   7 - 9 CM 80 - 100% Maximal Assist   10 - 14 CL 60 - 80% Moderate Assist   15 - 19 CK 40 - 60% Moderate Assist   20 - 22 CJ 20 - 40% Minimal Assist   23 CI 1-20% SBA / CGA   24 CH 0% Independent/ Mod I     Patient left up in chair with all lines intact, call button in reach and pt's wife present.    Assessment:  Suman Hayden is a 67 y.o. male with a medical diagnosis of LVAD (left ventricular assist device) present, inserted 7/27/17. Ambulated with Marcello IV pole this date, with improved stability and gait mechanics noted. Pt continues to require assist of 2 to transfer sit>stand and requires intermittent rest breaks during gait. Further (I) with mobility remains limited by  impaired endurance, weakness, and decreased balance. Pt would continue to benefit from skilled acute PT in order to address current deficits and progress functional mobility. Pt will require IP rehab upon d/c in order to progress safety and (I) with mobility prior to return home.     Rehab identified problem list/impairments: Rehab identified problem list/impairments: weakness, gait instability, impaired cardiopulmonary response to activity, impaired endurance, impaired balance, decreased safety awareness, pain, impaired functional mobilty, impaired self care skills, decreased coordination    Rehab potential is good.    Activity tolerance: Good    Discharge recommendations: Discharge Facility/Level Of Care Needs: rehabilitation facility     Barriers to discharge: Barriers to Discharge: Decreased caregiver support (at current functional level)    Equipment recommendations: Equipment Needed After Discharge:  (TBD)     GOALS:    Physical Therapy Goals        Problem: Physical Therapy Goal    Goal Priority Disciplines Outcome Goal Variances Interventions   Physical Therapy Goal     PT/OT, PT Ongoing (interventions implemented as appropriate)     Description:  Goals to be met by: 17     Patient will increase functional independence with mobility by performin. Supine to sit with MInimal Assistance-  Met 2017  2. Sit to supine with MInimal Assistance - not met  3. Sit to stand transfer with Minimal Assistance- not met  4. Bed to chair transfer with Minimal Assistance- not met  5. Gait  x 50 feet with Minimal Assistance. -  Met 2017  6. Lower extremity exercise program x15 reps, with supervision, in order to increase LE strength and (I) with functional mobility. - not met  7.Pt mod I supine to sit- not met  8. Pt receive gait training ~ 220 ft with AD if needed and supervision- not met                          PLAN:    Patient to be seen 6 x/week  to address the above listed problems via gait training,  therapeutic activities, therapeutic exercises  Plan of Care expires: 09/09/17  Plan of Care reviewed with: patient, spouse        Kely Willett, PT, DPT   8/31/2017  [phone]

## 2017-08-31 NOTE — PLAN OF CARE
Problem: SLP Goal  Goal: SLP Goal  Speech Language Pathology Goals  Goals expected to be met by 9/6   1. Pt will tolerate thin liquids with no overt s/s of aspiration.   2. Pt will tolerate dental soft diet with adequate a-p transit and no overt s/s of aspiration          Outcome: Outcome(s) achieved Date Met: 08/31/17  Pt seen to assess tolerance of diet. Pt presented with no overt s/s of aspiration or significant oral or pharyngeal dysphagia. No further skilled acute ST services warranted at this time. Please re-consult as needed.   MARIANO Up., CCC-SLP  Pager: [phone]  08/31/2017

## 2017-08-31 NOTE — PLAN OF CARE
Problem: Patient Care Overview  Goal: Plan of Care Review  Outcome: Ongoing (interventions implemented as appropriate)  Pt denies Chest pain, SOB or nausea or headache. VAD HM III-S&W daily. Family does but need couple more observation. No falls, trauma or injury noted. VSS. VAD numbers WNL. R UA PICC line.  infusing. IV antibiotics. Contact precautions continued. Accuchecks every 4 hrs. Poor appetite. Encouraged pt to eat more. Boost supplement. Plan of care reviewed with patient. No further questions at this time. No significant events. Will continue to monitor.

## 2017-08-31 NOTE — PROGRESS NOTES
Results for MIRTA LOPEZ (MRN [id number]) as of 8/31/2017 18:58   Ref. Range 8/31/2017 12:21   POCT Glucose Latest Ref Range: 70 - 110 mg/dL 67 (L)     BG 67.  Initiating D5% at 50cc/hr per orders.  SLP at the bedside; pt currently eating a cookie and drinking milk.  Per carmen Cee to skip PRN D50% injection at this time. Will continue to monitor.

## 2017-08-31 NOTE — PROGRESS NOTES
[facility]  Heart Transplant  Progress Note    Patient Name: Suman Hayden  MRN: [id number]  Admission Date: 7/18/2017  Hospital Length of Stay: 44 days  Attending Physician: Sunny Donwing MD  Primary Care Provider: Joe Ernst MD  Principal Problem:LVAD (left ventricular assist device) present    Subjective:     Interval History: Patient ate a banana, appetite improving slowly. Wife at bedside, encouraging patient. No complaints.     Continuous Infusions:   dextrose 5 % 50 mL/hr at 08/31/17 1216    DOBUTamine 5 mcg/kg/min (08/30/17 1303)     Scheduled Meds:   [START ON 9/1/2017] amlodipine  5 mg Oral Daily    ertapenem (INVANZ) IVPB  1 g Intravenous Q24H    lidocaine  1 patch Transdermal Q24H    pantoprazole  40 mg Intravenous BID    sodium chloride 0.9%  10 mL Intravenous Q6H     PRN Meds:albumin human 5%, albuterol sulfate, bisacodyl, dextrose 50%, glucagon (human recombinant), hydrALAZINE, hydrocodone-acetaminophen 5-325mg, insulin aspart, ondansetron, Flushing PICC Protocol **AND** sodium chloride 0.9% **AND** sodium chloride 0.9%    Review of patient's allergies indicates:  No Known Allergies  Objective:     Vital Signs (Most Recent):  Temp: 97.5 °F (36.4 °C) (08/31/17 1220)  Pulse: 76 (08/31/17 1220)  Resp: 18 (08/31/17 1220)  BP: (!) 84/0 (08/31/17 1221)  SpO2: 95 % (08/31/17 0925) Vital Signs (24h Range):  Temp:  [97.5 °F (36.4 °C)-99.2 °F (37.3 °C)] 97.5 °F (36.4 °C)  Pulse:  [45-90] 76  Resp:  [16-18] 18  SpO2:  [94 %-95 %] 95 %  BP: ()/(0-81) 84/0     Weight: 74.1 kg (163 lb 5.8 oz)  Body mass index is 24.84 kg/m².      Intake/Output Summary (Last 24 hours) at 08/31/17 1323  Last data filed at 08/31/17 1210   Gross per 24 hour   Intake              670 ml   Output              950 ml   Net             -280 ml     Physical Exam   Constitutional: He appears well-developed and well-nourished.   HENT:   Head: Normocephalic and atraumatic.   Eyes: EOM are normal. Pupils are  equal, round, and reactive to light.   Neck: Normal range of motion. Neck supple.   Cardiovascular: Normal rate and regular rhythm.    VAD hum smooth   Pulmonary/Chest: Effort normal and breath sounds normal.   Nursing note and vitals reviewed.    Significant Labs:  CBC:    Recent Labs  Lab 08/31/17 0420   WBC 12.65   RBC 2.73*   HGB 7.6*   HCT 25.7*      MCV 94   MCH 27.8   MCHC 29.6*     BNP:    Recent Labs  Lab 08/30/17  0320   BNP 2,643*     CMP:    Recent Labs  Lab 08/30/17  0320 08/31/17 0420   GLU 65* 60*   CALCIUM 8.8 8.6*   ALBUMIN 2.2*  --    PROT 6.9  --    * 156*   K 3.7 3.4*   CO2 34* 30*    114*   BUN 67* 53*   CREATININE 1.8* 1.7*   ALKPHOS 151*  --    ALT 20  --    AST 28  --    BILITOT 1.6*  --       Coagulation:     Recent Labs  Lab 08/31/17 0420   INR 4.4*   APTT 37.0*     LDH:    Recent Labs  Lab 08/29/17  0520 08/30/17 0320 08/31/17 0420   * 326* 278*     Microbiology:  Microbiology Results (last 7 days)     Procedure Component Value Units Date/Time    Blood culture [[id number]] Collected:  08/29/17 0836    Order Status:  Completed Specimen:  Blood from Line, Jugular, Internal Right Updated:  08/31/17 1022     Blood Culture, Routine No Growth to date     Blood Culture, Routine No Growth to date     Blood Culture, Routine No Growth to date    Narrative:       FromTrialysis,prior to removing line.          Estimated Creatinine Clearance: 40.8 mL/min (based on Cr of 1.7).        Assessment and Plan:     * LVAD (left ventricular assist device) present    -HeartMate 3 Implanted 7/27/2017 as DT   -s/p delayed chest closure (7/28/17) and extubated (7/29/17), reintubated 8/9/17 and extubated 8/13/17  -CTS Primary  -Implanted by Dr. Downing  -HOLD Coumadin, Goal INR 2.0-3.0. Supratherapeutic today.   -Antiplatelets  mg  -LDH is stable overall today. Will continue to monitor daily.  -Speed set at 5200 rpm rpm  -Interrogation notable for no events  -Not listed for OHTx            Stage III pressure ulcer of sacral region    - wound care        Hypernatremia    -Agree with starting D5W gtt        Bacteremia    - ESBL bacteremia.Cont ertapenem. Per ID- continue ertapenem x 4-6 weeks from 8/11. If discharged on ertapenem needs weekly cbc and cmp while on antibiotics (please fax results to ID clinic)          Acute kidney injury superimposed on CKD    -Creatining improving daily        Atrial fibrillation    -AC, Amio per C TS          Hyperglycemia    -per primary team        Atrial tachycardia    - [id number] - 3  -Rec restarting Amio. AC per CTS        AICD discharge    - Appropriate in the setting of VT aggravated by underlying AT/AFL (earlier during the admission). Device reprogrammed to VVI 80 this admission          Hepatitis B core antibody positive since 2012              V-tach    - Recommend resuming Amio        Hyperlipidemia    - Please resume pravastatin once liver enzymes stabilize             Abigail Green PA-C  Heart Transplant  [facility]

## 2017-08-31 NOTE — PT/OT/SLP PROGRESS
Speech Language Pathology  Dysphagia Treatment  Discharge    Suman Hayden   MRN: [id number]   CTSU 312/CTSU 312 A     Admitting Diagnosis: LVAD (left ventricular assist device) present    Diet recommendations: Solid Diet Level: Dental Soft  Liquid Diet Level: Thin Feed only when awake/alert, HOB to 90 degrees, Small bites/sips, 1 bite/sip at a time, Remain upright 30 minutes post meal and Meds whole 1 at a time    SLP Treatment Date: 08/31/17  Speech Start Time: 1210     Speech Stop Time: 1230     Speech Total (min): 20 min       TREATMENT BILLABLE MINUTES:  Treatment Swallowing Dysfunction 10 and Seld Care/Home Management Training 10    Has the patient been evaluated by SLP for swallowing? : Yes  Keep patient NPO?: No   General Precautions: Standard, LVAD, fall, sternal, contact  Current Respiratory Status: nasal cannula       Subjective:  Pt awake and cooperative seated in chair. Pt's wife present in room. RN present in room.        Objective:     Pt assessed with bites of an oatmeal raisin cookie and sips of water via straw. Pt presented with no overt s/s of aspiration with all trials. Pt reports no difficulties with swallowing. Pt still not eating many solids, however, is willing to try a little more this date. Pt mainly drinking Boosts. Pt okay for regular solids and thin liquids as tolerated. Pt wears dentures to eat. Assessed with cookies without dentures in place with noted adequate oral clearance. SLP educated pt and pt's wife on the s/s and risks of aspiration, SLP role, safe swallowing precautions, and d/c from ST services at this time. All questions and concerns addressed. Pt and pt's wife verbalized understanding.      Assessment:  Suman Hayden is a 67 y.o. male with a medical diagnosis of LVAD (left ventricular assist device) present and presents with no overt s/s of aspiration or significant oral or pharyngeal dysphagia. No further skilled acute ST services warranted at this time. Please re-consult as  needed.     Discharge recommendations: Discharge Facility/Level Of Care Needs: rehabilitation facility     Goals:    SLP Goals     Not on file          Multidisciplinary Problems (Resolved)        Problem: SLP Goal    Goal Priority Disciplines Outcome   SLP Goal   (Resolved)     SLP Outcome(s) achieved   Description:  Speech Language Pathology Goals  Goals expected to be met by 8/4:  1. Pt will tolerate a dental soft diet and thin liquids without s/s of aspiration.                Problem: SLP Goal    Goal Priority Disciplines Outcome   SLP Goal   (Resolved)     SLP Outcome(s) achieved   Description:  Speech Language Pathology Goals  Goals expected to be met by 9/6   1. Pt will tolerate thin liquids with no overt s/s of aspiration.   2. Pt will tolerate dental soft diet with adequate a-p transit and no overt s/s of aspiration                            Plan:   Patient to be seen Therapy Frequency: 5 x/week   Plan of Care expires: 09/12/17  Plan of Care reviewed with: patient, spouse  SLP Follow-up?: No         SLP G-Codes  Functional Assessment Tool Used: noms  Score: 6  Functional Limitations: Swallowing  Swallow Current Status (): DANIEL  Swallow Goal Status (): CI  Swallow Discharge Status (): SHERRI Howard, CCC-SLP   Pager: [phone]  08/31/2017

## 2017-08-31 NOTE — PROGRESS NOTES
PICC dressing changed with sterile technique per orders.  Statlock and caps changed.  Site CDI without redness, swelling, or drainage.  Pt tolerated well. Will continue to monitor.

## 2017-08-31 NOTE — PROGRESS NOTES
Daily E and M and VAD Interrogation Note    Reason for Visit: HM III implant   Patient is seen in follow up for management            [x] Other - HM III     Interval History:  [] Interval history unobtainable due to intubation.  The [x] implant/[] explant date was 7/27/17     Events -  NAEON.   Drinking boost without issues.  Still not taking solids well.      Review of Systems:   Negative except as above.     Medications:  Current Facility-Administered Medications   Medication Dose Route Frequency Provider Last Rate Last Dose    acetaminophen tablet 650 mg  650 mg Oral Once Andres Uriarte MD        albumin human 5% bottle 500 mL  500 mL Intravenous PRN Shayne Espinoza MD   500 mL at 08/09/17 0700    albuterol nebulizer solution 2.5 mg  2.5 mg Nebulization Q4H PRN Shayne Espinoza MD        [START ON 9/1/2017] amlodipine tablet 5 mg  5 mg Oral Daily Geovanna Marques NP        bisacodyl suppository 10 mg  10 mg Rectal Daily PRN Shayne Espinoza MD   10 mg at 08/06/17 2036    dextrose 5 % infusion   Intravenous Continuous Geovanna Marques NP        dextrose 50% injection 12.5 g  12.5 g Intravenous PRN Charbel Ritter MD   12.5 g at 08/30/17 0449    DOBUtamine 1000 mg in D5W 250 mL infusion (premix non-titrating)  5 mcg/kg/min (Dosing Weight) Intravenous Continuous Sunny Downing MD 6 mL/hr at 08/30/17 1303 5 mcg/kg/min at 08/30/17 1303    ertapenem (INVANZ) 1 g in sodium chloride 0.9 % 100 mL IVPB (ready to mix system)  1 g Intravenous Q24H Susannah Elizabeth PA-C 200 mL/hr at 08/30/17 2045 1 g at 08/30/17 2045    glucagon (human recombinant) injection 1 mg  1 mg Intramuscular PRN Charbel Ritter MD        hydrALAZINE injection 10 mg  10 mg Intravenous Q6H PRN Shlomo Serrato MD   10 mg at 08/31/17 0957    hydrocodone-acetaminophen 5-325mg per tablet 1 tablet  1 tablet Oral Q6H PRN Nadia Lucia NP   1 tablet at 08/31/17 0957    insulin aspart pen 0-5 Units  0-5 Units  Subcutaneous Q4H PRN Charbel Ritter MD   2 Units at 08/10/17 0751    lidocaine 5 % patch 1 patch  1 patch Transdermal Q24H Geovanna Marques, DARLING        ondansetron injection 4 mg  4 mg Intravenous Q6H PRN Shayne Espinoza MD   4 mg at 08/28/17 0743    pantoprazole injection 40 mg  40 mg Intravenous BID Nadia Lucia, DARLING   40 mg at 08/31/17 0958    sodium chloride 0.9% flush 10 mL  10 mL Intravenous Q6H Sunny Downing MD   10 mL at 08/31/17 0538    And    sodium chloride 0.9% flush 10 mL  10 mL Intravenous PRN Sunny Downing MD           Physical Examination:  Vital Signs:   Vitals:    08/31/17 0926   BP: (!) 96/0   Pulse:    Resp:    Temp:      Cardiovascular:  [x] Regular rate and rhythm  []  Other  [x]  No edema []  Edema present  [x]  Clear to auscultation  VAD sounds smooth  Skin:  Incision is [x]  Clean, dry and intact.    Sternum:  [x]  Stable []  Unstable  Driveline(s):   [x]  Clean, dry and intact.     Labs:  CBC, BMP, LDH, INR reviewed    Procedure:  Device Interrogation including analysis of device parameters.  Current Settings   [x]  Ventricular Assist Device     Review of device function is [x]  Stable     TXP LVAD INTERROGATIONS 8/31/2017 8/31/2017 8/31/2017 8/30/2017 8/30/2017 8/30/2017 8/30/2017   Type HeartMate3 (No Data) HeartMate3 HeartMate3 HeartMate3 HeartMate3 HeartMate3   Flow 4.2 - 4.2 4.1 4.4 4.3 4.4   Speed 5200 - 5200 5200 5200 5200 5200   PI 3.5 - 3.7 3.6 3.9 3.5 3.4   Power ([place]) 3.5 - 3.6 3.6 3.5 3.6 3.6   LSL 4800 - - - 4800 - -   Pulsatility Pulse - - - - - -   Some recent data might be hidden       Assessment:  [x]  Primary Cardiomyopathy [x]  Congestive Heart Failure   []  Atrial Fibrillation []  Ventricular Tachycardia   []  Aftercare cardiac device [x]  Long term (current) use of anticoagulants   []  Ventilator-associated pneumonia []  Pneumonia viral, unspecified   []  Pneumonia, bacterial, unspecified []  Pneumonia, organism unspecified   []  Hemorrhage of GI  tract, unspecified    []  Nosebleed []  Driveline infection    [x]  Decubitus/sacral         Plan:  [x]  Interval history obtained from ICU attending team member during rounding today  [x]  VAD/MATT teaching performed with patient  [x]  Mobilization / Physical Therapy ongoing  [x]  Anticoagulation []  Ongoing [x]  Held    Hypernatermia_ D5W gtt  Nutrition following   TPN re ordered  Calorie counts  Wound care consult  Specialty overlay bed  Continue inotropic support    Total time spent was 35 minutes.  Of which more than 50 percent of the care dominated counseling and coordinating care with different team members. The VAD was interrogated and all parameters were WNL and no significant findings were found in the history. All these findings are documented in the note above.      Date of Service: 08/31/2017       Cardiac Surgery Attending E and M (VAD) Note along with VAD Interrogation    I have seen and examined the patient and agree with the findings above    I also reviewed the patients clinical course and:  [x]  Hemodynamic & Respiratory paramters  [x]  Laboratory Data  [x]  Radiological studies     VAD Interrogated [x]      VAD Function is normal. Changes made []  None [x]        Interrogation of Ventricular assist device was performed with physician analysis of device parameters and review of device function. I have personally reviewed the interrogation findings and agree with findings as stated

## 2017-08-31 NOTE — PROGRESS NOTES
RD contacted by Geovanna w/ CTS re: pt's TPN. Pt cont's w/ poor PO intake of meals, although he is drinking 3-4 Boost Plus daily. Volume restriction desired. In light of this, rec 7% amino acid, 20% dextrose @ 40ml/hr to provide 921 cals & 67 gms protein. This will meet ~50% of pt's EPN & 70% of EEN. Geovanna reports they will likely have pt decrease Boost Plus consumption 2/2 Na content. 4 Boost daily provides 800 mg Na alone. Enc'd Geovanna to reach out to pharmacy to customize lytes in TPN. No intralipid needed due to Boost cosumption. CTSU RD to cont to f/u per protocol.   Lori Martin RD, LDN     [id number]

## 2017-08-31 NOTE — PT/OT/SLP PROGRESS
Occupational Therapy  Treatment    Suman Hayden   MRN: [id number]   Admitting Diagnosis: LVAD (left ventricular assist device) present    OT Date of Treatment: 08/31/17   OT Start Time: 0952  OT Stop Time: 1034  OT Total Time (min): 42 min    Billable Minutes:  Self Care/Home Management 30 and Therapeutic Activity 12    General Precautions: Standard, LVAD, fall, contact, sternal  Orthopedic Precautions: N/A  Braces: N/A    Subjective:  Communicated with RN prior to session. Pt agreeable to OT, wife present.    Pain/Comfort  Pain Rating 1: 0/10  Pain Rating Post-Intervention 1: 0/10    Objective:  Patient found with: telemetry, PICC line (LVAD to wall power)     Functional Mobility:  Bed Mobility: NT as pt sitting EOB and left up in chair     Transfers:  Sit <> Stand Assistance: Moderate Assistance from EOB x 2 trials  Sit <> Stand Assistive Device: No Assistive Device  Bed <> Chair Technique: Stand Pivot  Bed <> Chair Transfer Assistance: Moderate Assistance  Bed <> Chair Assistive Device: No Assistive Device    Functional Ambulation: Few steps from EOB to bedside chair with Mod A HHA    Activities of Daily Living:  UE Dressing Level of Assistance: Moderate assistance to don LVAD shld consolidation bag; able to don pullover t-shirt with SBA  LE Dressing Level of Assistance: Maximum assistance to don pants EOB; pt assisting to pull up over hips while standing    Balance:   Static Sit: GOOD+: Takes MAXIMAL challenges from all directions.    Dynamic Sit: GOOD: Maintains balance through MODERATE excursions of active trunk movement  Static Stand: POOR+: Needs MINIMAL assist to maintain  Dynamic stand: POOR: N/A    Therapeutic Activities and Exercises:  Pt sitting EOB upon OT entry with wife present assisting with finishing bath; donned pullover shirt with SBA and pants with Max A; pt improving with LVAD education, but still requires max cues and assist to switch to battery power; needs reinforcement for equipment names and  "sequencing steps correctly, also difficulty with untwisting cables and pulling apart; able to perform self-test and check battery power correctly without cues; donned LVAD shld bag with Mod A and required Mod A to stand from EOB x 2 trials; able to pivot to bedside chair with Mod A    AM-PAC 6 CLICK ADL   How much help from another person does this patient currently need?   1 = Unable, Total/Dependent Assistance  2 = A lot, Maximum/Moderate Assistance  3 = A little, Minimum/Contact Guard/Supervision  4 = None, Modified [place]/Independent    Putting on and taking off regular lower body clothing? : 2  Bathing (including washing, rinsing, drying)?: 2  Toileting, which includes using toilet, bedpan, or urinal? : 2  Putting on and taking off regular upper body clothing?: 2  Taking care of personal grooming such as brushing teeth?: 3  Eating meals?: 3  Total Score: 14     AM-PAC Raw Score CMS "G-Code Modifier Level of Impairment Assistance   6 % Total / Unable   7 - 8 CM 80 - 100% Maximal Assist   9-13 CL 60 - 80% Moderate Assist   14 - 19 CK 40 - 60% Moderate Assist   20 - 22 CJ 20 - 40% Minimal Assist   23 CI 1-20% SBA / CGA   24 CH 0% Independent/ Mod I       Patient left up in chair with all lines intact, call button in reach and wife present    ASSESSMENT:  Suman Hayden is a 67 y.o. male with a medical diagnosis of LVAD (left ventricular assist device) present and presents with good effort and participation in therapy. Pt making progress toward goals and improving level of assistance with T/Fs and managing LVAD; continues to be motivated. Pt would continue to benefit from OT to increase independence and safety. Recommend rehab upon D/C as pt currently unsafe to return home at this time and functional level.    Rehab identified problem list/impairments: Rehab identified problem list/impairments: weakness, impaired endurance, impaired self care skills, impaired functional mobilty, gait instability, impaired " balance, impaired cardiopulmonary response to activity, impaired fine motor    Rehab potential is good.    Activity tolerance: Good    Discharge recommendations: Discharge Facility/Level Of Care Needs: rehabilitation facility     Barriers to discharge: Barriers to Discharge: Decreased caregiver support (at current functional level)    Equipment recommendations:  (TBD)     GOALS:    Occupational Therapy Goals        Problem: Occupational Therapy Goal    Goal Priority Disciplines Outcome Interventions   Occupational Therapy Goal     OT, PT/OT Ongoing (interventions implemented as appropriate)    Description:  Goals to be met by:  2 weeks 9/12/17    Patient will increase functional independence with ADLs by performing:  Feeding: Independent   UE Dressing with Supervision.  LE Dressing with Supervision.  Grooming while standing with Supervision.  Toileting from toilet with Supervision for hygiene and clothing management.   Stand pivot transfers with Supervision.  Toilet transfer to toilet with Supervision.  Pt will be supervision  with LVAD yasmin't.     Goals to be met by:  2 weeks 8/28/17    Patient will increase functional independence with ADLs by performing:  Feeding: Independent   UE Dressing with Supervision.  LE Dressing with Supervision.  Grooming while standing with Supervision.  Toileting from toilet with Supervision for hygiene and clothing management.   Stand pivot transfers with Supervision.  Toilet transfer to toilet with Supervision.  Pt will be supervision  with LVAD yasmin't.                   Multidisciplinary Problems (Resolved)        Problem: Occupational Therapy Goal    Goal Priority Disciplines Outcome Interventions   Occupational Therapy Goal   (Resolved)     OT, PT/OT  Error    Description:  Goals to be met by: 8/04/2017    Patient will increase functional independence with ADLs by performing:    Increased functional strength to 5/5 for improved ADL performance.  Upper extremity exercise  program and R LE ankle pumps  3x 10 reps per handout, with independence.                      Plan:  Patient to be seen 6 x/week to address the above listed problems via self-care/home management, therapeutic activities, therapeutic exercises  Plan of Care expires: 08/29/17  Plan of Care reviewed with: patient, spouse         DELMY Mohamud  08/31/2017

## 2017-08-31 NOTE — ASSESSMENT & PLAN NOTE
-HeartMate 3 Implanted 7/27/2017 as DT   -s/p delayed chest closure (7/28/17) and extubated (7/29/17), reintubated 8/9/17 and extubated 8/13/17  -CTS Primary  -Implanted by Dr. Downing  -HOLD Coumadin, Goal INR 2.0-3.0. Supratherapeutic today.   -Antiplatelets  mg  -LDH is stable overall today. Will continue to monitor daily.  -Speed set at 5200 rpm rpm  -Interrogation notable for no events  -Not listed for OHTx

## 2017-09-01 LAB
ALBUMIN SERPL BCP-MCNC: 2.2 G/DL
ALP SERPL-CCNC: 122 U/L
ALT SERPL W/O P-5'-P-CCNC: 19 U/L
ANION GAP SERPL CALC-SCNC: 8 MMOL/L
ANION GAP SERPL CALC-SCNC: 8 MMOL/L
APTT BLDCRRT: 37.5 SEC
AST SERPL-CCNC: 24 U/L
BASOPHILS # BLD AUTO: 0.09 K/UL
BASOPHILS NFR BLD: 0.9 %
BILIRUB DIRECT SERPL-MCNC: 0.9 MG/DL
BILIRUB SERPL-MCNC: 1.3 MG/DL
BNP SERPL-MCNC: 1850 PG/ML
BUN SERPL-MCNC: 45 MG/DL
BUN SERPL-MCNC: 45 MG/DL
CALCIUM SERPL-MCNC: 8.4 MG/DL
CALCIUM SERPL-MCNC: 8.4 MG/DL
CHLORIDE SERPL-SCNC: 112 MMOL/L
CHLORIDE SERPL-SCNC: 112 MMOL/L
CO2 SERPL-SCNC: 31 MMOL/L
CO2 SERPL-SCNC: 31 MMOL/L
CREAT SERPL-MCNC: 1.7 MG/DL
CREAT SERPL-MCNC: 1.7 MG/DL
CRP SERPL-MCNC: 39.3 MG/L
DIFFERENTIAL METHOD: ABNORMAL
EOSINOPHIL # BLD AUTO: 0.4 K/UL
EOSINOPHIL NFR BLD: 3.8 %
ERYTHROCYTE [DISTWIDTH] IN BLOOD BY AUTOMATED COUNT: 17.7 %
EST. GFR  (AFRICAN AMERICAN): 47.2 ML/MIN/1.73 M^2
EST. GFR  (AFRICAN AMERICAN): 47.2 ML/MIN/1.73 M^2
EST. GFR  (NON AFRICAN AMERICAN): 40.8 ML/MIN/1.73 M^2
EST. GFR  (NON AFRICAN AMERICAN): 40.8 ML/MIN/1.73 M^2
GLUCOSE SERPL-MCNC: 98 MG/DL
GLUCOSE SERPL-MCNC: 98 MG/DL
HCT VFR BLD AUTO: 24.8 %
HGB BLD-MCNC: 7.5 G/DL
INR PPP: 5.1
LDH SERPL L TO P-CCNC: 282 U/L
LYMPHOCYTES # BLD AUTO: 1.4 K/UL
LYMPHOCYTES NFR BLD: 14.5 %
MAGNESIUM SERPL-MCNC: 2.2 MG/DL
MCH RBC QN AUTO: 28.1 PG
MCHC RBC AUTO-ENTMCNC: 30.2 G/DL
MCV RBC AUTO: 93 FL
MONOCYTES # BLD AUTO: 0.9 K/UL
MONOCYTES NFR BLD: 9.3 %
NEUTROPHILS # BLD AUTO: 6.8 K/UL
NEUTROPHILS NFR BLD: 71.1 %
PHOSPHATE SERPL-MCNC: 3 MG/DL
PLATELET # BLD AUTO: 264 K/UL
PMV BLD AUTO: 11.7 FL
POCT GLUCOSE: 108 MG/DL (ref 70–110)
POCT GLUCOSE: 122 MG/DL (ref 70–110)
POCT GLUCOSE: 127 MG/DL (ref 70–110)
POCT GLUCOSE: 128 MG/DL (ref 70–110)
POCT GLUCOSE: 166 MG/DL (ref 70–110)
POTASSIUM SERPL-SCNC: 3.2 MMOL/L
POTASSIUM SERPL-SCNC: 3.2 MMOL/L
PREALB SERPL-MCNC: 12 MG/DL
PROT SERPL-MCNC: 6.6 G/DL
PROTHROMBIN TIME: 52.6 SEC
RBC # BLD AUTO: 2.67 M/UL
SODIUM SERPL-SCNC: 151 MMOL/L
SODIUM SERPL-SCNC: 151 MMOL/L
WBC # BLD AUTO: 9.53 K/UL

## 2017-09-01 PROCEDURE — 83735 ASSAY OF MAGNESIUM: CPT

## 2017-09-01 PROCEDURE — 25000003 PHARM REV CODE 250: Performed by: NURSE PRACTITIONER

## 2017-09-01 PROCEDURE — 80048 BASIC METABOLIC PNL TOTAL CA: CPT

## 2017-09-01 PROCEDURE — 80076 HEPATIC FUNCTION PANEL: CPT

## 2017-09-01 PROCEDURE — 97116 GAIT TRAINING THERAPY: CPT

## 2017-09-01 PROCEDURE — 85610 PROTHROMBIN TIME: CPT

## 2017-09-01 PROCEDURE — 20600001 HC STEP DOWN PRIVATE ROOM

## 2017-09-01 PROCEDURE — 63600175 PHARM REV CODE 636 W HCPCS: Performed by: STUDENT IN AN ORGANIZED HEALTH CARE EDUCATION/TRAINING PROGRAM

## 2017-09-01 PROCEDURE — 25000003 PHARM REV CODE 250: Performed by: THORACIC SURGERY (CARDIOTHORACIC VASCULAR SURGERY)

## 2017-09-01 PROCEDURE — 99233 SBSQ HOSP IP/OBS HIGH 50: CPT | Mod: 24,,, | Performed by: THORACIC SURGERY (CARDIOTHORACIC VASCULAR SURGERY)

## 2017-09-01 PROCEDURE — 63600175 PHARM REV CODE 636 W HCPCS: Performed by: GENERAL PRACTICE

## 2017-09-01 PROCEDURE — 93306 TTE W/DOPPLER COMPLETE: CPT | Mod: 26,,, | Performed by: INTERNAL MEDICINE

## 2017-09-01 PROCEDURE — 83880 ASSAY OF NATRIURETIC PEPTIDE: CPT

## 2017-09-01 PROCEDURE — 97803 MED NUTRITION INDIV SUBSEQ: CPT | Performed by: DIETITIAN, REGISTERED

## 2017-09-01 PROCEDURE — 63600175 PHARM REV CODE 636 W HCPCS: Performed by: PHYSICIAN ASSISTANT

## 2017-09-01 PROCEDURE — 36415 COLL VENOUS BLD VENIPUNCTURE: CPT

## 2017-09-01 PROCEDURE — 63600175 PHARM REV CODE 636 W HCPCS: Performed by: THORACIC SURGERY (CARDIOTHORACIC VASCULAR SURGERY)

## 2017-09-01 PROCEDURE — 27000248 HC VAD-ADDITIONAL DAY

## 2017-09-01 PROCEDURE — 63600175 PHARM REV CODE 636 W HCPCS: Performed by: NURSE PRACTITIONER

## 2017-09-01 PROCEDURE — 84100 ASSAY OF PHOSPHORUS: CPT

## 2017-09-01 PROCEDURE — 86140 C-REACTIVE PROTEIN: CPT

## 2017-09-01 PROCEDURE — 85730 THROMBOPLASTIN TIME PARTIAL: CPT

## 2017-09-01 PROCEDURE — 83615 LACTATE (LD) (LDH) ENZYME: CPT

## 2017-09-01 PROCEDURE — 85025 COMPLETE CBC W/AUTO DIFF WBC: CPT

## 2017-09-01 PROCEDURE — C9113 INJ PANTOPRAZOLE SODIUM, VIA: HCPCS | Performed by: NURSE PRACTITIONER

## 2017-09-01 PROCEDURE — 84134 ASSAY OF PREALBUMIN: CPT

## 2017-09-01 PROCEDURE — 93750 INTERROGATION VAD IN PERSON: CPT | Mod: ,,, | Performed by: THORACIC SURGERY (CARDIOTHORACIC VASCULAR SURGERY)

## 2017-09-01 PROCEDURE — 25000003 PHARM REV CODE 250: Performed by: STUDENT IN AN ORGANIZED HEALTH CARE EDUCATION/TRAINING PROGRAM

## 2017-09-01 PROCEDURE — 93306 TTE W/DOPPLER COMPLETE: CPT

## 2017-09-01 PROCEDURE — 99232 SBSQ HOSP IP/OBS MODERATE 35: CPT | Mod: ,,, | Performed by: INTERNAL MEDICINE

## 2017-09-01 PROCEDURE — A4216 STERILE WATER/SALINE, 10 ML: HCPCS | Performed by: THORACIC SURGERY (CARDIOTHORACIC VASCULAR SURGERY)

## 2017-09-01 PROCEDURE — A4217 STERILE WATER/SALINE, 500 ML: HCPCS | Performed by: NURSE PRACTITIONER

## 2017-09-01 PROCEDURE — 97530 THERAPEUTIC ACTIVITIES: CPT

## 2017-09-01 RX ORDER — PRAVASTATIN SODIUM 20 MG/1
20 TABLET ORAL NIGHTLY
Status: DISCONTINUED | OUTPATIENT
Start: 2017-09-02 | End: 2017-09-22 | Stop reason: HOSPADM

## 2017-09-01 RX ORDER — POTASSIUM CHLORIDE 20 MEQ/1
20 TABLET, EXTENDED RELEASE ORAL 2 TIMES DAILY
Status: DISCONTINUED | OUTPATIENT
Start: 2017-09-01 | End: 2017-09-02

## 2017-09-01 RX ADMIN — Medication 10 ML: at 05:09

## 2017-09-01 RX ADMIN — BISACODYL 10 MG: 10 SUPPOSITORY RECTAL at 09:09

## 2017-09-01 RX ADMIN — ONDANSETRON 4 MG: 2 INJECTION INTRAMUSCULAR; INTRAVENOUS at 09:09

## 2017-09-01 RX ADMIN — DEXTROSE: 5 SOLUTION INTRAVENOUS at 09:09

## 2017-09-01 RX ADMIN — PANTOPRAZOLE SODIUM 40 MG: 40 INJECTION, POWDER, FOR SOLUTION INTRAVENOUS at 09:09

## 2017-09-01 RX ADMIN — ERTAPENEM SODIUM 1 G: 1 INJECTION, POWDER, LYOPHILIZED, FOR SOLUTION INTRAMUSCULAR; INTRAVENOUS at 08:09

## 2017-09-01 RX ADMIN — POTASSIUM CHLORIDE 20 MEQ: 1500 TABLET, EXTENDED RELEASE ORAL at 09:09

## 2017-09-01 RX ADMIN — AMLODIPINE BESYLATE 5 MG: 5 TABLET ORAL at 09:09

## 2017-09-01 RX ADMIN — POTASSIUM CHLORIDE 20 MEQ: 1500 TABLET, EXTENDED RELEASE ORAL at 08:09

## 2017-09-01 RX ADMIN — RETINOL, ERGOCALCIFEROL, .ALPHA.-TOCOPHEROL ACETATE, DL-, PHYTONADIONE, ASCORBIC ACID, NIACINAMIDE, RIBOFLAVIN 5-PHOSPHATE SODIUM, THIAMINE HYDROCHLORIDE, PYRIDOXINE HYDROCHLORIDE, DEXPANTHENOL, BIOTIN, FOLIC ACID, AND CYANOCOBALAMIN: KIT at 09:09

## 2017-09-01 RX ADMIN — HYDROCODONE BITARTRATE AND ACETAMINOPHEN 1 TABLET: 5; 325 TABLET ORAL at 04:09

## 2017-09-01 RX ADMIN — DOBUTAMINE IN DEXTROSE 5 MCG/KG/MIN: 400 INJECTION, SOLUTION INTRAVENOUS at 05:09

## 2017-09-01 RX ADMIN — PANTOPRAZOLE SODIUM 40 MG: 40 INJECTION, POWDER, FOR SOLUTION INTRAVENOUS at 08:09

## 2017-09-01 RX ADMIN — LIDOCAINE 1 PATCH: 50 PATCH TOPICAL at 10:09

## 2017-09-01 RX ADMIN — HYDRALAZINE HYDROCHLORIDE 10 MG: 20 INJECTION INTRAMUSCULAR; INTRAVENOUS at 04:09

## 2017-09-01 RX ADMIN — HYDRALAZINE HYDROCHLORIDE 10 MG: 20 INJECTION INTRAMUSCULAR; INTRAVENOUS at 09:09

## 2017-09-01 NOTE — PROGRESS NOTES
[facility]  Adult Nutrition  Progress Note    SUMMARY     Recommendations    Recommendation/Intervention:   Pt's intake is improving, but still sporadic with vomiting at times. Recommend continuing current TPN infusion until intake is consistent and vomiting has subsided. Once intake is > 50% of all meals with Boost TID, wean TPN. Electrolytes in TPN managed by pharmacy.     Calorie count collected (see below), but continuing with wife provided assistance. RD following.     Goals: Meet >/=85% of EEN/EPN  Nutrition Goal Status: progressing towards goal  Communication of RD Recs: reviewed with RN    Reason for Assessment    Reason for Assessment: RD follow-up  Diagnosis:  (s/p LVAD)  Relevent Medical History: NICM, HTN, HLD   Interdisciplinary Rounds: did not attend     General Information Comments: Pt is starting to eat more, wife is helping RD with calorie count, pt ate his breakfast with boost this morning and vomiting it up right afterwards. Wife gave food preferences. TPN running at 40 mL/hr. Pt disliked purees, on regular texture now and tolerating.      Nutrition Discharge Planning: Adequate PO nutrition    Nutrition Prescription Ordered    Current Diet Order: Cardiac  Nutrition Order Comments: 25% PO  Current Nutrition Support Formula Ordered: Other (Comment) (Custom 7% AA/ 20% Dex)  Current Nutrition Support Rate Ordered: 40 (ml)  Current Nutrition Support Frequency Ordered: mL/hr  Oral Nutrition Supplement: Boost Plus     Evaluation of Received Nutrients/Fluid Intake       Parenteral Calories (kcal): 921  Parenteral Protein (gm): 67  Parenteral Fluid (mL): 960  Oral Calories (kcal): 420  Oral Protein (gm):  (38)     Other Calories (kcal): 0  Total Calories (kcal):  (2151-1801 kcal/day)     Energy Calories Required: meeting needs  % Kcal Needs: 115  Avg Calorie Intake (Specify # of Days): 3 (ave 1600 per day)     Total Protein (gm): 105     Protein Required: meeting needs  % Protein Needs:  " (-)     IV Fluid (mL):  (IVPB meds)     GIR (Glucose Infusion Rate) (mg/kg/min): 1.67 mg/kg/min  Lipid Calories (kcals): 0 kcals  I/O: + 1421 mL x 24 hrs         Fluid Required: meeting needs  Comments: lbm   Tolerance: tolerating  % Intake of Estimated Energy Needs: 75 - 100 %  % Meal Intake: 25%     Nutrition Risk Screen     Nutrition Risk Screen: no indicators present    Nutrition/Diet History    Patient Reported Diet/Restrictions/Preferences: general, low salt  Typical Food/Fluid Intake: TPN, Boost daily x 3 and ~25% of meals  Food Preferences: wife provided        Factors Affecting Nutritional Intake: decreased appetite, difficulty/impaired swallowing                Labs/Tests/Procedures/Meds       Pertinent Labs Reviewed: reviewed  Pertinent Labs Comments: Na 151, K 3.2, bun 45, Cr 1.7  Pertinent Medications Reviewed: reviewed  Pertinent Medications Comments: warfarin, oxycodone    Physical Findings    Overall Physical Appearance: lethargic  Tubes: other (see comments) (none)  Oral/Mouth Cavity: tooth/teeth missing  Skin: incision (chest incision)    Anthropometrics    Temp: 98.4 °F (36.9 °C)     Height: 5' 8" (172.7 cm)  Weight Method: Bed Scale  Weight: 74.1 kg (163 lb 5.8 oz)  Ideal Body Weight (IBW), Male: 154 lb     % Ideal Body Weight, Male (lb): 127.27 lb     BMI (Calculated): 29.9  BMI Grade: 25 - 29.9 - overweight     Usual Body Weight (UBW), k.8 kg (admit wt)     % Usual Body Weight: 115.78  % Weight Change From Usual Weight: -15.78 %             Estimated/Assessed Needs    Weight Used For Calorie Calculations: 79.9 kg (176 lb 2.4 oz) (dosing wt)      Energy Calorie Requirements (kcal): 1936  Energy Need Method: Surry-St Jeor (x 1.25 (PAL))        RMR (Surry-St. Jeor Equation): 1548.5        Weight Used For Protein Calculations: 79.9 kg (176 lb 2.4 oz) (dosing wt)  Protein Requirements:  gms (1.2-1.4 gms/kg dosing wt)     Fluid Need Method: RDA Method, other (see comments) (Per MD " or 1 mL/kcal)        RDA Method (mL): 1936               Assessment and Plan    * LVAD (left ventricular assist device) present    Nutrition Diagnosis:  Increased nutrient needs    Related to (etiology):   Protein    Signs and Symptoms (as evidenced by):   S/p LVAD    Interventions/Recommendations (treatment strategy):  See RD recs above.     Nutrition Diagnosis Status:   New              Monitor and Evaluation    Food and Nutrient Intake: parenteral nutrition intake, energy intake  Food and Nutrient Adminstration: diet order, enteral and parenteral nutrition administration     Physical Activity and Function: nutrition-related ADLs and IADLs  Anthropometric Measurements: weight, weight change, body mass index  Biochemical Data, Medical Tests and Procedures: gastrointestinal profile, electrolyte and renal panel, glucose/endocrine profile, lipid profile, inflammatory profile  Nutrition-Focused Physical Findings: overall appearance    Nutrition Risk    Level of Risk:  (2x/week)    Nutrition Follow-Up    RD Follow-up?: Yes

## 2017-09-01 NOTE — PROGRESS NOTES
Lab called regarding INR: 5.1. Dr. Uriarte notified. No orders received. Bleeding precautions maintained.

## 2017-09-01 NOTE — PLAN OF CARE
Problem: Patient Care Overview  Goal: Plan of Care Review  Outcome: Revised  Pt free of falls/trauma/injuries.  Denies c/o SOB.  Surgical pain managed with PO analgesics and Lidocaine patch. Generalized skin remains CDI; no edema noted.  TEDs placed.  PT/OT following.  Dietary following.   continues to infuse.  D5% continues to infuse 50cc/hr. Pt being treated with Ertapenem IVPB daily.  Blood glucose diet controlled; no supplemental insulin required.  LVAD working properly this shift without any complications.  LVAD dressing to be changed daily with soap/water; dressing remains CDI.  Pt and wife continue to study workbook and watch LVAD DVDs.  Pt and wife still requiring checkoffs for alarms and dressing changes.  Pt tolerating plan of care.

## 2017-09-01 NOTE — PROGRESS NOTES
VAD dsg changed per RN. Spouse already slept. Woke her up but she states its too late for her. She told me to do it for today and she will do it tomorrow at day time. Spouse was requested at least to observe. Dsg changed with soap and water. Sterile precautions maintained.  Site stage I, sutures intact, no drainage. Educated spouse about maintaining hands sterile. Spouse verbalized understanding. Pt tolerated well. Will continue to monitor the site.

## 2017-09-01 NOTE — PROGRESS NOTES
"LVAD dressing changed with soap/sterile water per orders. Dressing change performed by pt's wife.  She required 4 attempts to apply sterile gloves, verbal feedback for setting up and maintaining sterile field, and reinforcement of cleansing steps. She would benefit from a few more monitored dressing changes before being checked off.  Site "2", CDI, without redness, swelling, or drainage.  Sutures intact with small scabs noted. Pt tolerated procedure well.  Will continue to reinforce dressing change protocol.  Discussed with pt and wife suggested improvements.  Will continue to monitor.  "

## 2017-09-01 NOTE — PROGRESS NOTES
Daily E and M and VAD Interrogation Note    Reason for Visit: HM III implant   Patient is seen in follow up for management            [x] Other - HM III     Interval History:  [] Interval history unobtainable due to intubation.  The [x] implant/[] explant date was 7/27/17     Events -  NAEON.   Drinking boost without issues.  Took in some solid foods last night.      Review of Systems:   Negative except as above.     Medications:  Current Facility-Administered Medications   Medication Dose Route Frequency Provider Last Rate Last Dose    albumin human 5% bottle 500 mL  500 mL Intravenous PRN Shayne Espinoza MD   500 mL at 08/09/17 0700    albuterol nebulizer solution 2.5 mg  2.5 mg Nebulization Q4H PRN Shayne Espinoza MD        amlodipine tablet 5 mg  5 mg Oral Daily Geovanna Marques NP   5 mg at 09/01/17 0912    bisacodyl suppository 10 mg  10 mg Rectal Daily PRN Shayne Espinoza MD   10 mg at 09/01/17 0912    dextrose 5 % infusion   Intravenous Continuous Geovanna Marques NP 50 mL/hr at 09/01/17 0942      dextrose 50% injection 12.5 g  12.5 g Intravenous PRN Charbel Ritter MD   12.5 g at 08/30/17 0449    DOBUtamine 1000 mg in D5W 250 mL infusion (premix non-titrating)  5 mcg/kg/min (Dosing Weight) Intravenous Continuous Sunny Downing MD 6 mL/hr at 09/01/17 0541 5 mcg/kg/min at 09/01/17 0541    ertapenem (INVANZ) 1 g in sodium chloride 0.9 % 100 mL IVPB (ready to mix system)  1 g Intravenous Q24H Susannah Elizabeth PA-C 200 mL/hr at 08/31/17 2016 1 g at 08/31/17 2016    glucagon (human recombinant) injection 1 mg  1 mg Intramuscular PRN Charbel Ritter MD        hydrALAZINE injection 10 mg  10 mg Intravenous Q6H PRN Shlomo Serrato MD   10 mg at 09/01/17 0912    hydrocodone-acetaminophen 5-325mg per tablet 1 tablet  1 tablet Oral Q6H PRN Nadia Lucia NP   1 tablet at 08/31/17 1806    insulin aspart pen 0-5 Units  0-5 Units Subcutaneous Q4H PRN Charbel Ritter MD   2 Units  at 08/10/17 0751    lidocaine 5 % patch 1 patch  1 patch Transdermal Q24H Geovanna Marques NP   1 patch at 08/31/17 1216    ondansetron injection 4 mg  4 mg Intravenous Q6H PRN Shayne Espinoza MD   4 mg at 09/01/17 0912    pantoprazole injection 40 mg  40 mg Intravenous BID Nadia Lucia NP   40 mg at 09/01/17 0911    potassium chloride SA CR tablet 20 mEq  20 mEq Oral BID Geovanna Marques NP   20 mEq at 09/01/17 0912    sodium chloride 0.9% flush 10 mL  10 mL Intravenous Q6H Sunny Downing MD   10 mL at 09/01/17 0542    And    sodium chloride 0.9% flush 10 mL  10 mL Intravenous PRN Sunny Downing MD        TPN ADULT CENTRAL LINE CUSTOM   Intravenous Continuous Geovanna Marques NP 40 mL/hr at 08/31/17 2159         Physical Examination:  Vital Signs:   Vitals:    09/01/17 0900   BP: (!) 86/0   Pulse: 80   Resp: 18   Temp: 98.2 °F (36.8 °C)     Cardiovascular:  [x] Regular rate and rhythm  []  Other  [x]  No edema []  Edema present  [x]  Clear to auscultation  VAD sounds smooth  Skin:  Incision is [x]  Clean, dry and intact.    Sternum:  [x]  Stable []  Unstable  Driveline(s):   [x]  Clean, dry and intact.     Labs:  CBC, BMP, LDH, INR reviewed    Procedure:  Device Interrogation including analysis of device parameters.  Current Settings   [x]  Ventricular Assist Device     Review of device function is [x]  Stable     TXP LVAD INTERROGATIONS 9/1/2017 9/1/2017 9/1/2017 8/31/2017 8/31/2017 8/31/2017 8/31/2017   Type HeartMate3 HeartMate3 HeartMate3 HeartMate3 HeartMate3 HeartMate3 HeartMate3   Flow 4.1 4.2 4.3 4.3 4.1 4.3 4.2   Speed 5200 5200 5200 5200 5200 5200 5200   PI 3.6 3.4 3.3 3.9 3.4 3.6 3.5   Power ([place]) 3.5 3.6 3.6 3.6 3.6 3.6 3.5   LSL 4800 - - - - - 4800   Pulsatility - - - - - - Pulse   Some recent data might be hidden       Assessment:  [x]  Primary Cardiomyopathy [x]  Congestive Heart Failure   []  Atrial Fibrillation []  Ventricular Tachycardia   []  Aftercare cardiac device [x]  Long  term (current) use of anticoagulants   []  Ventilator-associated pneumonia []  Pneumonia viral, unspecified   []  Pneumonia, bacterial, unspecified []  Pneumonia, organism unspecified   []  Hemorrhage of GI tract, unspecified    []  Nosebleed []  Driveline infection    [x]  Decubitus/sacral         Plan:  [x]  Interval history obtained from ICU attending team member during rounding today  [x]  VAD/MATT teaching performed with patient  [x]  Mobilization / Physical Therapy ongoing  [x]  Anticoagulation []  Ongoing [x]  Held    Hypernatremia: (improving) D5W gtt   Hypokalemia: replaced  Nutrition following   TPN re ordered  Calorie counts  Wound care consult  Specialty overlay bed  Continue inotropic support    Total time spent was 35 minutes.  Of which more than 50 percent of the care dominated counseling and coordinating care with different team members. The VAD was interrogated and all parameters were WNL and no significant findings were found in the history. All these findings are documented in the note above.      Date of Service: 09/01/2017       Cardiac Surgery Attending E and M (VAD) Note along with VAD Interrogation    I have seen and examined the patient and agree with the findings above    I also reviewed the patients clinical course and:  [x]  Hemodynamic & Respiratory paramters  [x]  Laboratory Data  [x]  Radiological studies     VAD Interrogated [x]      VAD Function is normal. Changes made []  None [x]        Interrogation of Ventricular assist device was performed with physician analysis of device parameters and review of device function. I have personally reviewed the interrogation findings and agree with findings as stated

## 2017-09-01 NOTE — PLAN OF CARE
Problem: Physical Therapy Goal  Goal: Physical Therapy Goal  Goals to be met by: 17     Patient will increase functional independence with mobility by performin. Supine to sit with MInimal Assistance-  Met 2017  2. Sit to supine with MInimal Assistance - not met  3. Sit to stand transfer with Minimal Assistance- not met  4. Bed to chair transfer with Minimal Assistance- not met  5. Gait  x 50 feet with Minimal Assistance. -  Met 2017  6. Lower extremity exercise program x15 reps, with supervision, in order to increase LE strength and (I) with functional mobility. - not met  7.Pt mod I supine to sit- not met  8. Pt receive gait training ~ 220 ft with AD if needed and supervision- not met         Outcome: Ongoing (interventions implemented as appropriate)  Goals reviewed and remain appropriate. Pt progressing towards goals.    Kely Willett, PT, DPT   2017  [phone]

## 2017-09-01 NOTE — PLAN OF CARE
Problem: Patient Care Overview  Goal: Plan of Care Review  Outcome: Ongoing (interventions implemented as appropriate)  Pt denies Chest pain, SOB or nausea or headache. VAD HM III-S&W daily.No falls, trauma or injury noted. VSS. VAD numbers WNL. R UA PICC line.  and TPN infusing. IV antibiotics. Dextrose 5% infusing.  Contact precautions continued. Accuchecks every 4 hrs. Poor appetite. Encouraged pt to eat more. Boost supplement. Plan of care reviewed with patient. No further questions at this time. No significant events. Will continue to monitor.

## 2017-09-01 NOTE — PT/OT/SLP PROGRESS
Physical Therapy  Treatment    Suman Hayden   MRN: [id number]   Admitting Diagnosis: LVAD (left ventricular assist device) present    PT Received On: 09/01/17  PT Start Time: 1056     PT Stop Time: 1113    PT Total Time (min): 17 min     + PT returned from 11:29 - 12:05 (36 minutes)  Total treatment time: 53 minutes     Billable Minutes:  Gait [id number] and Therapeutic Activity 20    Treatment Type: Treatment  PT/PTA: PT     PTA Visit Number: 0       General Precautions: Standard, LVAD, fall, sternal, contact  Orthopedic Precautions: N/A   Braces: N/A    Do you have any cultural, spiritual, Gnosticist conflicts, given your current situation?: none noted     Subjective:  Communicated with RN prior to session.  Pt agreeable to therapy.     Pain/Comfort  Pain Rating 1:  (did not rate)  Location - Side 1: Left  Location 1: chest  Pain Addressed 1: Reposition, Distraction    Objective:   Patient found with: telemetry, PICC line, peripheral IV (LVAD to battery power)    Functional Mobility:  Bed Mobility:   Supine to Sit:  (not performed 2* pt UIC before and after treatment session)    Transfers:  Sit <> Stand Assistance: Maximum Assistance (with assist of 2; x8 reps)  Sit <> Stand Assistive Device:  (Kenvil IV pole)  Toilet Transfer Technique: Stand Pivot (x3 reps: BSC>chair, chair>BSC, BSC>chair)  Toilet Transfer Assistance: Moderate Assistance  Toilet Transfer Assistive Device:  (HHA)    Gait:   Gait Distance: 6 ft. + 16 ft. + 46 ft. + 6 ft. + 26 ft. + 8 ft. (seated rest breaks between ambulation trials)   Assistance 1: Minimum assistance  Gait Assistive Device:  (Live[employer] IV pole)  Gait Pattern: swing-through gait  Gait Deviation(s): decreased hal, increased time in double stance, decreased velocity of limb motion, decreased toe-to-floor clearance, decreased weight-shifting ability, decreased step length, excessive knee flexion (narrow ARVIN; increased trunk flexion)   Chair follow throughout   Emergency bag present  throughout     Cues for maintaining close proximity to AD, upright posture, and safety awareness.      Balance:   Static Sit: SBA  Dynamic Sit: CGA-Pio  Static Stand: CGA-Pio  Dynamic stand: min-modA     Therapeutic Activities and Exercises:  Pt found on BSC finishing OT session. Transferred from BSC to chair. Marcello IV pole prepared for ambulation. Pt then reported need to have BM. Transferred back to [facility] Pt left seated on BSC to perform seated toileting with wife at bedside. PT and therapy tech returned to room to assist pt off of BSC. Required maxA from pt's wife for hygiene with minAx2 for standing balance. Stand pivot to chair. Pt brought to hallway via chair for gait training. Performed gait training as described above. Pt visibly fatigued towards the end of gait training, limiting further ambulation. RN notified and given PT pager number to assist with return to bed as needed.    Max cues throughout mobility for maintaining sternal precautions.      AM-PAC 6 CLICK MOBILITY   How much help from another person does this patient currently need?   1 = Unable, Total/Dependent Assistance  2 = A lot, Maximum/Moderate Assistance  3 = A little, Minimum/Contact Guard/Supervision  4 = None, Modified [place]/Independent    Turning over in bed (including adjusting bedclothes, sheets and blankets)?: 3  Sitting down on and standing up from a chair with arms (e.g., wheelchair, bedside commode, etc.): 2  Moving from lying on back to sitting on the side of the bed?: 3  Moving to and from a bed to a chair (including a wheelchair)?: 3  Need to walk in hospital room?: 3  Climbing 3-5 steps with a railing?: 1  Total Score: 15    AM-PAC Raw Score CMS G-Code Modifier Level of Impairment Assistance   6 % Total / Unable   7 - 9 CM 80 - 100% Maximal Assist   10 - 14 CL 60 - 80% Moderate Assist   15 - 19 CK 40 - 60% Moderate Assist   20 - 22 CJ 20 - 40% Minimal Assist   23 CI 1-20% SBA / CGA   24 CH 0% Independent/  Mod I     Patient left up in chair with all lines intact, call button in reach, RN notified and pt's wife present.    Assessment:  Suman Hayden is a 67 y.o. male with a medical diagnosis of LVAD (left ventricular assist device) present, inserted 17. Functional mobility limited this date with pt unable to progress ambulation distance. Pt able to ambulate x6 brief bouts with seated rest break between each. Noted weakness and visible fatigue during ambulation, limiting further progression of mobility. Pt would continue to benefit from skilled acute PT in order to address current deficits and progress functional mobility.     Rehab identified problem list/impairments: Rehab identified problem list/impairments: weakness, impaired functional mobilty, gait instability, impaired endurance, impaired balance, impaired self care skills, impaired cardiopulmonary response to activity, pain, decreased safety awareness    Rehab potential is good.    Activity tolerance: Good    Discharge recommendations: Discharge Facility/Level Of Care Needs: rehabilitation facility     Barriers to discharge: Barriers to Discharge: Decreased caregiver support (at current functional level)    Equipment recommendations: Equipment Needed After Discharge:  (TBD)     GOALS:    Physical Therapy Goals        Problem: Physical Therapy Goal    Goal Priority Disciplines Outcome Goal Variances Interventions   Physical Therapy Goal     PT/OT, PT Ongoing (interventions implemented as appropriate)     Description:  Goals to be met by: 17     Patient will increase functional independence with mobility by performin. Supine to sit with MInimal Assistance-  Met 2017  2. Sit to supine with MInimal Assistance - not met  3. Sit to stand transfer with Minimal Assistance- not met  4. Bed to chair transfer with Minimal Assistance- not met  5. Gait  x 50 feet with Minimal Assistance. -  Met 2017  6. Lower extremity exercise program x15 reps, with  supervision, in order to increase LE strength and (I) with functional mobility. - not met  7.Pt mod I supine to sit- not met  8. Pt receive gait training ~ 220 ft with AD if needed and supervision- not met                          PLAN:    Patient to be seen 6 x/week  to address the above listed problems via gait training, therapeutic activities, therapeutic exercises  Plan of Care expires: 09/09/17  Plan of Care reviewed with: patient, spouse        Kely Willett, PT, DPT   9/1/2017  [phone]

## 2017-09-01 NOTE — SUBJECTIVE & OBJECTIVE
Interval History: Patient vomiting this morning during my visit after eating eggs, oatmeal with raisins and boost. Tolerated eating ribs and watermelon for dinner last night. Wife thinks he needs an enema    Continuous Infusions:   dextrose 5 % 50 mL/hr at 09/01/17 0942    DOBUTamine 5 mcg/kg/min (09/01/17 0541)    TPN ADULT CENTRAL LINE CUSTOM 40 mL/hr at 08/31/17 2159     Scheduled Meds:   amlodipine  5 mg Oral Daily    ertapenem (INVANZ) IVPB  1 g Intravenous Q24H    lidocaine  1 patch Transdermal Q24H    pantoprazole  40 mg Intravenous BID    potassium chloride SA  20 mEq Oral BID    sodium chloride 0.9%  10 mL Intravenous Q6H     PRN Meds:albumin human 5%, albuterol sulfate, bisacodyl, dextrose 50%, glucagon (human recombinant), hydrALAZINE, hydrocodone-acetaminophen 5-325mg, insulin aspart, ondansetron, Flushing PICC Protocol **AND** sodium chloride 0.9% **AND** sodium chloride 0.9%    Review of patient's allergies indicates:  No Known Allergies  Objective:     Vital Signs (Most Recent):  Temp: 98.4 °F (36.9 °C) (09/01/17 1200)  Pulse: 96 (09/01/17 1200)  Resp: 17 (09/01/17 1200)  BP: (!) 86/0 (09/01/17 0900)  SpO2: 98 % (09/01/17 1200) Vital Signs (24h Range):  Temp:  [97.8 °F (36.6 °C)-98.7 °F (37.1 °C)] 98.4 °F (36.9 °C)  Pulse:  [74-96] 96  Resp:  [17-18] 17  SpO2:  [94 %-98 %] 98 %  BP: ()/(0-58) 86/0     Weight: 74.1 kg (163 lb 5.8 oz)  Body mass index is 24.84 kg/m².      Intake/Output Summary (Last 24 hours) at 09/01/17 1303  Last data filed at 09/01/17 1100   Gross per 24 hour   Intake          2555.67 ml   Output              875 ml   Net          1680.67 ml     Physical Exam   Constitutional: He is oriented to person, place, and time. He appears well-developed and well-nourished.   HENT:   Head: Normocephalic and atraumatic.   Eyes: EOM are normal. Pupils are equal, round, and reactive to light.   Neck: Normal range of motion. Neck supple.   Cardiovascular: Normal rate and regular  rhythm.    No murmur heard.  VAD hum smooth   Pulmonary/Chest: Effort normal and breath sounds normal.   Abdominal: Soft. Bowel sounds are normal. He exhibits distension (mild). There is no tenderness. There is no guarding.   Musculoskeletal: Normal range of motion. He exhibits no edema.   Neurological: He is alert and oriented to person, place, and time.   Skin: Skin is warm and dry.   Nursing note and vitals reviewed.    Significant Labs:  CBC:    Recent Labs  Lab 09/01/17 0420   WBC 9.53   RBC 2.67*   HGB 7.5*   HCT 24.8*      MCV 93   MCH 28.1   MCHC 30.2*     BNP:    Recent Labs  Lab 09/01/17 0420   BNP 1,850*     CMP:    Recent Labs  Lab 09/01/17 0420   GLU 98  98   CALCIUM 8.4*  8.4*   ALBUMIN 2.2*   PROT 6.6   *  151*   K 3.2*  3.2*   CO2 31*  31*   *  112*   BUN 45*  45*   CREATININE 1.7*  1.7*   ALKPHOS 122   ALT 19   AST 24   BILITOT 1.3*      Coagulation:     Recent Labs  Lab 09/01/17 0420   INR 5.1*   APTT 37.5*     LDH:    Recent Labs  Lab 08/30/17  0320 08/31/17  0420 09/01/17  0420   * 278* 282*     Microbiology:  Microbiology Results (last 7 days)     Procedure Component Value Units Date/Time    Blood culture [[id number]] Collected:  08/29/17 0836    Order Status:  Completed Specimen:  Blood from Line, Jugular, Internal Right Updated:  09/01/17 1022     Blood Culture, Routine No Growth to date     Blood Culture, Routine No Growth to date     Blood Culture, Routine No Growth to date     Blood Culture, Routine No Growth to date    Narrative:       FromTrialysis,prior to removing line.          Estimated Creatinine Clearance: 40.8 mL/min (based on SCr of 1.7 mg/dL (H)).

## 2017-09-01 NOTE — PT/OT/SLP PROGRESS
Occupational Therapy  Treatment    Suman Hayden   MRN: [id number]   Admitting Diagnosis: LVAD (left ventricular assist device) present    OT Date of Treatment: 09/01/17   OT Start Time: 1015  OT Stop Time: 1100  OT Total Time (min): 45 min    Billable Minutes:  Self Care/Home Management 45    General Precautions: Standard, LVAD, fall, contact, sternal  Orthopedic Precautions: N/A    Subjective:  Communicated with nsg prior to session.  Pt agreeable to therapy   Pain/Comfort  Pain Rating 1: 0/10    Objective:      Pt found seated on BSC  With LVAD to wall power.     Functional Mobility:  Transfers:   Sit <> Stand Assistance: Total Assistance  Sit <> Stand Assistive Device: No Assistive Device  Bed <> Chair Technique: Stand Pivot  Bed <> Chair Transfer Assistance: Total Assistance      Activities of Daily Living:       UE Dressing Level of Assistance: Moderate assistance aureliano shirt  LE Dressing Level of Assistance: Maximum assistance aureliano pants; TOTAL A to aureliano shoes.  Toileting Level of Assistance: Total assistance x 2 persons    Pt completed self test with supervision and called out numbers to spouse to record in binder. Pt mis-reading the numbers 75% of the time with OT providing assist. Pt denies trouble with vision.   Pt unable to accurately recall LVAD parts by correct name. Spouse verb good understanding.   Pt's spouse then converted pt from A/C power to battery power with supervision. Spouse placed items in shoulder bag.     2 attempts completed to assist pt with sit>stand from BSC; however, pt unable to clear buttocks. While waiting for further assistance, pt completed B UE AA/AROM exs.  Once further assistance arrived to room, pt required TOTAL A x 2 persons for stand, toileting and stand pivot t/f to b/s chair.       AM-PAC 6 CLICK ADL   How much help from another person does this patient currently need?   1 = Unable, Total/Dependent Assistance  2 = A lot, Maximum/Moderate Assistance  3 = A little,  "Minimum/Contact Guard/Supervision  4 = None, Modified [place]/Independent    Putting on and taking off regular lower body clothing? : 1  Bathing (including washing, rinsing, drying)?: 1  Toileting, which includes using toilet, bedpan, or urinal? : 1  Putting on and taking off regular upper body clothing?: 2  Taking care of personal grooming such as brushing teeth?: 3  Eating meals?: 3  Total Score: 11     AM-PAC Raw Score CMS "G-Code Modifier Level of Impairment Assistance   6 % Total / Unable   7 - 8 CM 80 - 100% Maximal Assist   9-13 CL 60 - 80% Moderate Assist   14 - 19 CK 40 - 60% Moderate Assist   20 - 22 CJ 20 - 40% Minimal Assist   23 CI 1-20% SBA / CGA   24 CH 0% Independent/ Mod I       Patient left up in chair with all lines intact, call button in reach and spouse, nursing and PT present    ASSESSMENT:  Suman Hayden is a 67 y.o. male with a medical diagnosis of LVAD (left ventricular assist device) present and tolerated session fairly well. Pt with pleasant affect and good effort. Pt remains significantly limited with functional mobility/ADL skills. Pt to benefit from further inpatient therapy prior to d/c home to allow for safe t/f home with spouse. .    Rehab identified problem list/impairments: Rehab identified problem list/impairments: weakness, impaired balance, impaired self care skills, impaired functional mobilty, impaired endurance, gait instability    Rehab potential is good.    Activity tolerance: Good    Discharge recommendations: Discharge Facility/Level Of Care Needs: rehabilitation facility       GOALS:    Occupational Therapy Goals        Problem: Occupational Therapy Goal    Goal Priority Disciplines Outcome Interventions   Occupational Therapy Goal     OT, PT/OT Ongoing (interventions implemented as appropriate)    Description:  Goals to be met by:  2 weeks 9/12/17    Patient will increase functional independence with ADLs by performing:  Feeding: Independent   UE Dressing with " Supervision.  LE Dressing with Supervision.  Grooming while standing with Supervision.  Toileting from toilet with Supervision for hygiene and clothing management.   Stand pivot transfers with Supervision.  Toilet transfer to toilet with Supervision.  Pt will be supervision  with LVAD joann.     Goals to be met by:  2 weeks 8/28/17    Patient will increase functional independence with ADLs by performing:  Feeding: Independent   UE Dressing with Supervision.  LE Dressing with Supervision.  Grooming while standing with Supervision.  Toileting from toilet with Supervision for hygiene and clothing management.   Stand pivot transfers with Supervision.  Toilet transfer to toilet with Supervision.  Pt will be supervision  with LVAD joann.                   Multidisciplinary Problems (Resolved)        Problem: Occupational Therapy Goal    Goal Priority Disciplines Outcome Interventions   Occupational Therapy Goal   (Resolved)     OT, PT/OT  Error    Description:  Goals to be met by: 8/04/2017    Patient will increase functional independence with ADLs by performing:    Increased functional strength to 5/5 for improved ADL performance.  Upper extremity exercise program and R LE ankle pumps  3x 10 reps per handout, with independence.                      Plan:  Patient to be seen 6 x/week to address the above listed problems via self-care/home management, therapeutic activities, therapeutic exercises  Plan of Care expires: 08/29/17  Plan of Care reviewed with: patient, spouse         DELMY Navarro  09/01/2017

## 2017-09-01 NOTE — PROGRESS NOTES
[facility]  Heart Transplant  Progress Note    Patient Name: Suman Hayden  MRN: [id number]  Admission Date: 7/18/2017  Hospital Length of Stay: 45 days  Attending Physician: Sunny Downing MD  Primary Care Provider: Joe Ernst MD  Principal Problem:LVAD (left ventricular assist device) present    Subjective:     Interval History: Patient vomiting this morning during my visit after eating eggs, oatmeal with raisins and boost. Tolerated eating ribs and watermelon for dinner last night. Wife thinks he needs an enema    Continuous Infusions:   dextrose 5 % 50 mL/hr at 09/01/17 0942    DOBUTamine 5 mcg/kg/min (09/01/17 0541)    TPN ADULT CENTRAL LINE CUSTOM 40 mL/hr at 08/31/17 2159     Scheduled Meds:   amlodipine  5 mg Oral Daily    ertapenem (INVANZ) IVPB  1 g Intravenous Q24H    lidocaine  1 patch Transdermal Q24H    pantoprazole  40 mg Intravenous BID    potassium chloride SA  20 mEq Oral BID    sodium chloride 0.9%  10 mL Intravenous Q6H     PRN Meds:albumin human 5%, albuterol sulfate, bisacodyl, dextrose 50%, glucagon (human recombinant), hydrALAZINE, hydrocodone-acetaminophen 5-325mg, insulin aspart, ondansetron, Flushing PICC Protocol **AND** sodium chloride 0.9% **AND** sodium chloride 0.9%    Review of patient's allergies indicates:  No Known Allergies  Objective:     Vital Signs (Most Recent):  Temp: 98.4 °F (36.9 °C) (09/01/17 1200)  Pulse: 96 (09/01/17 1200)  Resp: 17 (09/01/17 1200)  BP: (!) 86/0 (09/01/17 0900)  SpO2: 98 % (09/01/17 1200) Vital Signs (24h Range):  Temp:  [97.8 °F (36.6 °C)-98.7 °F (37.1 °C)] 98.4 °F (36.9 °C)  Pulse:  [74-96] 96  Resp:  [17-18] 17  SpO2:  [94 %-98 %] 98 %  BP: ()/(0-58) 86/0     Weight: 74.1 kg (163 lb 5.8 oz)  Body mass index is 24.84 kg/m².      Intake/Output Summary (Last 24 hours) at 09/01/17 1303  Last data filed at 09/01/17 1100   Gross per 24 hour   Intake          2555.67 ml   Output              875 ml   Net          1680.67  ml     Physical Exam   Constitutional: He is oriented to person, place, and time. He appears well-developed and well-nourished.   HENT:   Head: Normocephalic and atraumatic.   Eyes: EOM are normal. Pupils are equal, round, and reactive to light.   Neck: Normal range of motion. Neck supple.   Cardiovascular: Normal rate and regular rhythm.    No murmur heard.  VAD hum smooth   Pulmonary/Chest: Effort normal and breath sounds normal.   Abdominal: Soft. Bowel sounds are normal. He exhibits distension (mild). There is no tenderness. There is no guarding.   Musculoskeletal: Normal range of motion. He exhibits no edema.   Neurological: He is alert and oriented to person, place, and time.   Skin: Skin is warm and dry.   Nursing note and vitals reviewed.    Significant Labs:  CBC:    Recent Labs  Lab 09/01/17 0420   WBC 9.53   RBC 2.67*   HGB 7.5*   HCT 24.8*      MCV 93   MCH 28.1   MCHC 30.2*     BNP:    Recent Labs  Lab 09/01/17 0420   BNP 1,850*     CMP:    Recent Labs  Lab 09/01/17 0420   GLU 98  98   CALCIUM 8.4*  8.4*   ALBUMIN 2.2*   PROT 6.6   *  151*   K 3.2*  3.2*   CO2 31*  31*   *  112*   BUN 45*  45*   CREATININE 1.7*  1.7*   ALKPHOS 122   ALT 19   AST 24   BILITOT 1.3*      Coagulation:     Recent Labs  Lab 09/01/17 0420   INR 5.1*   APTT 37.5*     LDH:    Recent Labs  Lab 08/30/17  0320 08/31/17  0420 09/01/17 0420   * 278* 282*     Microbiology:  Microbiology Results (last 7 days)     Procedure Component Value Units Date/Time    Blood culture [[id number]] Collected:  08/29/17 0836    Order Status:  Completed Specimen:  Blood from Line, Jugular, Internal Right Updated:  09/01/17 1022     Blood Culture, Routine No Growth to date     Blood Culture, Routine No Growth to date     Blood Culture, Routine No Growth to date     Blood Culture, Routine No Growth to date    Narrative:       FromTrialysis,prior to removing line.          Estimated Creatinine Clearance: 40.8 mL/min  (based on SCr of 1.7 mg/dL (H)).        Assessment and Plan:     * LVAD (left ventricular assist device) present    -HeartMate 3 Implanted 7/27/2017 as DT   -s/p delayed chest closure (7/28/17) and extubated (7/29/17), reintubated 8/9/17 and extubated 8/13/17  -CTS Primary  -Implanted by Dr. Downing  -HOLD Coumadin, Goal INR 2.0-3.0. Supratherapeutic today.   -Antiplatelets  mg  -LDH is stable overall today. Will continue to monitor daily.  -Speed set at 5200 rpm rpm  -Interrogation notable for no events  -Not listed for OHTx           Stage III pressure ulcer of sacral region    - wound care        Hypernatremia    -Agree with starting D5W gtt        Bacteremia    - ESBL bacteremia.Cont ertapenem. Per ID- continue ertapenem x 4-6 weeks from 8/11. If discharged on ertapenem needs weekly cbc and cmp while on antibiotics (please fax results to ID clinic)          Acute kidney injury superimposed on CKD    -Creatining improving daily        Atrial fibrillation    -AC, Amio per C TS          Hyperglycemia    -per primary team        Atrial tachycardia    - [id number] - 3  -Rec restarting Amio. AC per CTS        AICD discharge    - Appropriate in the setting of VT aggravated by underlying AT/AFL (earlier during the admission). Device reprogrammed to VVI 80 this admission          Hepatitis B core antibody positive since 2012              V-tach    - Recommend resuming Amio        Hyperlipidemia    - Please resume pravastatin once liver enzymes stabilize             Abigail Green PA-C  Heart Transplant  [facility]

## 2017-09-01 NOTE — ASSESSMENT & PLAN NOTE
Nutrition Diagnosis:  Increased nutrient needs    Related to (etiology):   Protein    Signs and Symptoms (as evidenced by):   S/p LVAD    Interventions/Recommendations (treatment strategy):  See RD recs above.     Nutrition Diagnosis Status:   New

## 2017-09-01 NOTE — PLAN OF CARE
Problem: Patient Care Overview  Goal: Plan of Care Review  Outcome: Revised  Pt free of falls/trauma/injuries.  Denies c/o SOB.  Surgical pain managed with PO analgesics. Generalized skin remains CDI; no edema noted.  PT/OT following.  Dietary following.   continues to infuse.  Initiated D5% gtt at 50cc/hr.  PICC dressing changed today.  Blood glucose diet controlled; no supplemental insulin required.  LVAD working properly this shift without any complications.  LVAD dressing to be changed daily with soap/water; dressing remains CDI.  Pt and wife continue to study workbook and watch LVAD DVDs.  Pt and wife still requiring checkoffs for alarms and dressing changes.  Pt tolerating plan of care.

## 2017-09-01 NOTE — PLAN OF CARE
Problem: Occupational Therapy Goal  Goal: Occupational Therapy Goal  Goals to be met by:  2 weeks 9/12/17    Patient will increase functional independence with ADLs by performing:  Feeding: Independent   UE Dressing with Supervision.  LE Dressing with Supervision.  Grooming while standing with Supervision.  Toileting from toilet with Supervision for hygiene and clothing management.   Stand pivot transfers with Supervision.  Toilet transfer to toilet with Supervision.  Pt will be supervision  with LVAD yasmin't.     Goals to be met by:  2 weeks 8/28/17    Patient will increase functional independence with ADLs by performing:  Feeding: Independent   UE Dressing with Supervision.  LE Dressing with Supervision.  Grooming while standing with Supervision.  Toileting from toilet with Supervision for hygiene and clothing management.   Stand pivot transfers with Supervision.  Toilet transfer to toilet with Supervision.  Pt will be supervision  with LVAD yasmin't.        Goals remain appropriate.

## 2017-09-01 NOTE — ASSESSMENT & PLAN NOTE
-HeartMate 3 Implanted 7/27/2017 as DT   -s/p delayed chest closure (7/28/17) and extubated (7/29/17), reintubated 8/9/17 and extubated 8/13/17  -CTS Primary  -Implanted by Dr. Downing  -HOLD Coumadin, Goal INR 2.0-3.0. Supratherapeutic today again at 4.3.  -Antiplatelets  mg  -LDH is stable overall today. Will continue to monitor daily.  -Speed set at 5200 rpm rpm  -Interrogation notable for no events  -Not listed for OHTx

## 2017-09-02 LAB
ANION GAP SERPL CALC-SCNC: 10 MMOL/L
BASOPHILS # BLD AUTO: 0.04 K/UL
BASOPHILS NFR BLD: 0.4 %
BUN SERPL-MCNC: 38 MG/DL
CALCIUM SERPL-MCNC: 8.2 MG/DL
CHLORIDE SERPL-SCNC: 110 MMOL/L
CO2 SERPL-SCNC: 27 MMOL/L
CREAT SERPL-MCNC: 1.5 MG/DL
DIASTOLIC DYSFUNCTION: YES
DIFFERENTIAL METHOD: ABNORMAL
EOSINOPHIL # BLD AUTO: 0.3 K/UL
EOSINOPHIL NFR BLD: 3.3 %
ERYTHROCYTE [DISTWIDTH] IN BLOOD BY AUTOMATED COUNT: 17.6 %
EST. GFR  (AFRICAN AMERICAN): 54.9 ML/MIN/1.73 M^2
EST. GFR  (NON AFRICAN AMERICAN): 47.5 ML/MIN/1.73 M^2
ESTIMATED PA SYSTOLIC PRESSURE: 48.64
GLUCOSE SERPL-MCNC: 83 MG/DL
HCT VFR BLD AUTO: 23.1 %
HGB BLD-MCNC: 7 G/DL
INR PPP: 4.3
LDH SERPL L TO P-CCNC: 265 U/L
LYMPHOCYTES # BLD AUTO: 1.7 K/UL
LYMPHOCYTES NFR BLD: 17.5 %
MAGNESIUM SERPL-MCNC: 1.7 MG/DL
MCH RBC QN AUTO: 27.9 PG
MCHC RBC AUTO-ENTMCNC: 30.3 G/DL
MCV RBC AUTO: 92 FL
MITRAL VALVE REGURGITATION: ABNORMAL
MONOCYTES # BLD AUTO: 0.8 K/UL
MONOCYTES NFR BLD: 7.9 %
NEUTROPHILS # BLD AUTO: 6.7 K/UL
NEUTROPHILS NFR BLD: 70.6 %
PHOSPHATE SERPL-MCNC: 2.5 MG/DL
PLATELET # BLD AUTO: 256 K/UL
PMV BLD AUTO: 11.5 FL
POCT GLUCOSE: 111 MG/DL (ref 70–110)
POCT GLUCOSE: 115 MG/DL (ref 70–110)
POCT GLUCOSE: 125 MG/DL (ref 70–110)
POCT GLUCOSE: 98 MG/DL (ref 70–110)
POTASSIUM SERPL-SCNC: 3.1 MMOL/L
POTASSIUM SERPL-SCNC: 3.3 MMOL/L
PROTHROMBIN TIME: 43.4 SEC
RBC # BLD AUTO: 2.51 M/UL
RETIRED EF AND QEF - SEE NOTES: 10 (ref 55–65)
SODIUM SERPL-SCNC: 147 MMOL/L
TRICUSPID VALVE REGURGITATION: ABNORMAL
WBC # BLD AUTO: 9.53 K/UL

## 2017-09-02 PROCEDURE — 83735 ASSAY OF MAGNESIUM: CPT

## 2017-09-02 PROCEDURE — 85025 COMPLETE CBC W/AUTO DIFF WBC: CPT

## 2017-09-02 PROCEDURE — 84100 ASSAY OF PHOSPHORUS: CPT

## 2017-09-02 PROCEDURE — C9113 INJ PANTOPRAZOLE SODIUM, VIA: HCPCS | Performed by: NURSE PRACTITIONER

## 2017-09-02 PROCEDURE — 63600175 PHARM REV CODE 636 W HCPCS: Performed by: PHYSICIAN ASSISTANT

## 2017-09-02 PROCEDURE — 99232 SBSQ HOSP IP/OBS MODERATE 35: CPT | Mod: ,,, | Performed by: INTERNAL MEDICINE

## 2017-09-02 PROCEDURE — 63600175 PHARM REV CODE 636 W HCPCS: Performed by: GENERAL PRACTICE

## 2017-09-02 PROCEDURE — 63600175 PHARM REV CODE 636 W HCPCS: Performed by: NURSE PRACTITIONER

## 2017-09-02 PROCEDURE — 63600175 PHARM REV CODE 636 W HCPCS: Performed by: STUDENT IN AN ORGANIZED HEALTH CARE EDUCATION/TRAINING PROGRAM

## 2017-09-02 PROCEDURE — 20600001 HC STEP DOWN PRIVATE ROOM

## 2017-09-02 PROCEDURE — 85610 PROTHROMBIN TIME: CPT

## 2017-09-02 PROCEDURE — 83615 LACTATE (LD) (LDH) ENZYME: CPT

## 2017-09-02 PROCEDURE — 25000003 PHARM REV CODE 250: Performed by: NURSE PRACTITIONER

## 2017-09-02 PROCEDURE — A4216 STERILE WATER/SALINE, 10 ML: HCPCS | Performed by: THORACIC SURGERY (CARDIOTHORACIC VASCULAR SURGERY)

## 2017-09-02 PROCEDURE — 25000003 PHARM REV CODE 250: Performed by: THORACIC SURGERY (CARDIOTHORACIC VASCULAR SURGERY)

## 2017-09-02 PROCEDURE — 80048 BASIC METABOLIC PNL TOTAL CA: CPT

## 2017-09-02 PROCEDURE — 27000248 HC VAD-ADDITIONAL DAY

## 2017-09-02 PROCEDURE — 84132 ASSAY OF SERUM POTASSIUM: CPT

## 2017-09-02 RX ORDER — LANOLIN ALCOHOL/MO/W.PET/CERES
400 CREAM (GRAM) TOPICAL 3 TIMES DAILY
Status: DISCONTINUED | OUTPATIENT
Start: 2017-09-02 | End: 2017-09-07

## 2017-09-02 RX ORDER — POTASSIUM CHLORIDE 14.9 MG/ML
20 INJECTION INTRAVENOUS
Status: COMPLETED | OUTPATIENT
Start: 2017-09-02 | End: 2017-09-02

## 2017-09-02 RX ORDER — POTASSIUM CHLORIDE 7.45 MG/ML
10 INJECTION INTRAVENOUS ONCE
Status: COMPLETED | OUTPATIENT
Start: 2017-09-02 | End: 2017-09-02

## 2017-09-02 RX ORDER — POTASSIUM CHLORIDE 20 MEQ/1
40 TABLET, EXTENDED RELEASE ORAL 2 TIMES DAILY
Status: DISCONTINUED | OUTPATIENT
Start: 2017-09-02 | End: 2017-09-05

## 2017-09-02 RX ORDER — AMLODIPINE BESYLATE 10 MG/1
10 TABLET ORAL DAILY
Status: DISCONTINUED | OUTPATIENT
Start: 2017-09-03 | End: 2017-09-22 | Stop reason: HOSPADM

## 2017-09-02 RX ADMIN — POTASSIUM CHLORIDE 40 MEQ: 1500 TABLET, EXTENDED RELEASE ORAL at 09:09

## 2017-09-02 RX ADMIN — DIBASIC SODIUM PHOSPHATE, MONOBASIC POTASSIUM PHOSPHATE AND MONOBASIC SODIUM PHOSPHATE 2 TABLET: 852; 155; 130 TABLET ORAL at 11:09

## 2017-09-02 RX ADMIN — HYDRALAZINE HYDROCHLORIDE 10 MG: 20 INJECTION INTRAMUSCULAR; INTRAVENOUS at 09:09

## 2017-09-02 RX ADMIN — LIDOCAINE 1 PATCH: 50 PATCH TOPICAL at 11:09

## 2017-09-02 RX ADMIN — RETINOL, ERGOCALCIFEROL, .ALPHA.-TOCOPHEROL ACETATE, DL-, PHYTONADIONE, ASCORBIC ACID, NIACINAMIDE, RIBOFLAVIN 5-PHOSPHATE SODIUM, THIAMINE HYDROCHLORIDE, PYRIDOXINE HYDROCHLORIDE, DEXPANTHENOL, BIOTIN, FOLIC ACID, AND CYANOCOBALAMIN: KIT at 09:09

## 2017-09-02 RX ADMIN — AMLODIPINE BESYLATE 5 MG: 5 TABLET ORAL at 09:09

## 2017-09-02 RX ADMIN — DEXTROSE: 5 SOLUTION INTRAVENOUS at 06:09

## 2017-09-02 RX ADMIN — MAGNESIUM OXIDE TAB 400 MG (241.3 MG ELEMENTAL MG) 400 MG: 400 (241.3 MG) TAB at 01:09

## 2017-09-02 RX ADMIN — PANTOPRAZOLE SODIUM 40 MG: 40 INJECTION, POWDER, FOR SOLUTION INTRAVENOUS at 09:09

## 2017-09-02 RX ADMIN — HYDRALAZINE HYDROCHLORIDE 10 MG: 20 INJECTION INTRAMUSCULAR; INTRAVENOUS at 04:09

## 2017-09-02 RX ADMIN — POTASSIUM CHLORIDE 20 MEQ: 200 INJECTION, SOLUTION INTRAVENOUS at 09:09

## 2017-09-02 RX ADMIN — POTASSIUM CHLORIDE 20 MEQ: 200 INJECTION, SOLUTION INTRAVENOUS at 07:09

## 2017-09-02 RX ADMIN — ERTAPENEM SODIUM 1 G: 1 INJECTION, POWDER, LYOPHILIZED, FOR SOLUTION INTRAMUSCULAR; INTRAVENOUS at 09:09

## 2017-09-02 RX ADMIN — ONDANSETRON 4 MG: 2 INJECTION INTRAMUSCULAR; INTRAVENOUS at 09:09

## 2017-09-02 RX ADMIN — POTASSIUM CHLORIDE 40 MEQ: 1500 TABLET, EXTENDED RELEASE ORAL at 08:09

## 2017-09-02 RX ADMIN — PRAVASTATIN SODIUM 20 MG: 20 TABLET ORAL at 08:09

## 2017-09-02 RX ADMIN — MAGNESIUM OXIDE TAB 400 MG (241.3 MG ELEMENTAL MG) 400 MG: 400 (241.3 MG) TAB at 09:09

## 2017-09-02 RX ADMIN — PANTOPRAZOLE SODIUM 40 MG: 40 INJECTION, POWDER, FOR SOLUTION INTRAVENOUS at 08:09

## 2017-09-02 RX ADMIN — Medication 10 ML: at 11:09

## 2017-09-02 RX ADMIN — POTASSIUM CHLORIDE 10 MEQ: 10 INJECTION, SOLUTION INTRAVENOUS at 09:09

## 2017-09-02 NOTE — ASSESSMENT & PLAN NOTE
- ESBL bacteremia.Cont ertapenem. Per ID- continue ertapenem x 4-6 weeks from 8/11. If discharged on ertapenem needs weekly cbc and cmp while on antibiotics (please fax results to ID clinic)  - blood cultures from 8/29 NGTD

## 2017-09-02 NOTE — PROGRESS NOTES
Pt's VAD dressing changed per protocol using soap and water and sterile technique. Pt's wife preformed dressing change. Pt's wife had trouble donning sterile gloves, but when instructed on how to do so properly she was able to demonstrate learning.  Pt's wife also broke sterile field twice, pt was able to demonstrate proper procedure when breaking sterile field, ei: re-gloving or tossing contaminated gauze/item. Pt's drive line site is clean, dry, intact with no drainage or signs of infection; DLES is + 2. No kinks or frays on drive line, secured with delgado anchor. Pt tolerated dressing change well. Next dressing change due 9/3/2017. Pt's wife needs a few more dressing changes to be checked off. Pt's wife given a size 8 to practice donning gloves. Pt and pt's wife were re-watching videos and reviewing workbook. Will continue to monitor.

## 2017-09-02 NOTE — PROGRESS NOTES
Daily E and M and VAD Interrogation Note    Reason for Visit: HM III implant   Patient is seen in follow up for management            [x] Other - HM III     Interval History:  [] Interval history unobtainable due to intubation.  The [x] implant/[] explant date was 7/27/17     Events -  NAEON.   Drinking boost without issues.  Took in some solid foods last night.      Review of Systems:   Negative except as above.     Medications:  Current Facility-Administered Medications   Medication Dose Route Frequency Provider Last Rate Last Dose    albumin human 5% bottle 500 mL  500 mL Intravenous PRN Shayne Espinoza MD   500 mL at 08/09/17 0700    albuterol nebulizer solution 2.5 mg  2.5 mg Nebulization Q4H PRN Shayne Espinoza MD        amlodipine tablet 5 mg  5 mg Oral Daily Geovanna Marques NP   5 mg at 09/01/17 0912    bisacodyl suppository 10 mg  10 mg Rectal Daily PRN Shayne Espinoza MD   10 mg at 09/01/17 0912    dextrose 5 % infusion   Intravenous Continuous Geovanna Marques NP 50 mL/hr at 09/02/17 0643      dextrose 50% injection 12.5 g  12.5 g Intravenous PRN Charbel Ritter MD   12.5 g at 08/30/17 0449    DOBUtamine 1000 mg in D5W 250 mL infusion (premix non-titrating)  5 mcg/kg/min (Dosing Weight) Intravenous Continuous Sunny Downing MD 6 mL/hr at 09/01/17 0541 5 mcg/kg/min at 09/01/17 0541    ertapenem (INVANZ) 1 g in sodium chloride 0.9 % 100 mL IVPB (ready to mix system)  1 g Intravenous Q24H Susannah Elizabeth PA-C 200 mL/hr at 09/01/17 2037 1 g at 09/01/17 2037    glucagon (human recombinant) injection 1 mg  1 mg Intramuscular PRN Charbel Ritter MD        hydrALAZINE injection 10 mg  10 mg Intravenous Q6H PRN Shlomo Serrato MD   10 mg at 09/02/17 0450    hydrocodone-acetaminophen 5-325mg per tablet 1 tablet  1 tablet Oral Q6H PRN Nadia Lucia NP   1 tablet at 09/01/17 1623    insulin aspart pen 0-5 Units  0-5 Units Subcutaneous Q4H PRN Charbel Ritter MD   2 Units  at 08/10/17 0751    k phos di & mono-sod phos mono 250 mg tablet 2 tablet  2 tablet Oral Once Geovanna Marques NP        lidocaine 5 % patch 1 patch  1 patch Transdermal Q24H Geovanna Marques NP   1 patch at 09/01/17 1045    magnesium oxide tablet 400 mg  400 mg Oral TID Geovanna Marques NP        ondansetron injection 4 mg  4 mg Intravenous Q6H PRN Shayne Espinoza MD   4 mg at 09/01/17 0912    pantoprazole injection 40 mg  40 mg Intravenous BID Nadia Lucia NP   40 mg at 09/01/17 2037    potassium chloride 10 mEq in 100 mL IVPB  10 mEq Intravenous Once Geovanna Marques NP        potassium chloride SA CR tablet 40 mEq  40 mEq Oral BID Geovanna Marques NP        pravastatin tablet 20 mg  20 mg Oral QHS Geovanna Marques NP        sodium chloride 0.9% flush 10 mL  10 mL Intravenous Q6H Sunny Downing MD   10 mL at 09/01/17 0542    And    sodium chloride 0.9% flush 10 mL  10 mL Intravenous PRN Sunny Downing MD        TPN ADULT CENTRAL LINE CUSTOM   Intravenous Continuous Geovanna Marques NP 40 mL/hr at 09/01/17 2115         Physical Examination:  Vital Signs:   Vitals:    09/02/17 0800   BP:    Pulse: 79   Resp:    Temp:      Cardiovascular:  [x] Regular rate and rhythm  []  Other  [x]  No edema []  Edema present  [x]  Clear to auscultation  VAD sounds smooth  Skin:  Incision is [x]  Clean, dry and intact.    Sternum:  [x]  Stable []  Unstable  Driveline(s):   [x]  Clean, dry and intact.     Labs:  CBC, BMP, LDH, INR reviewed    Procedure:  Device Interrogation including analysis of device parameters.  Current Settings   [x]  Ventricular Assist Device     Review of device function is [x]  Stable     TXP LVAD INTERROGATIONS 9/2/2017 9/2/2017 9/1/2017 9/1/2017 9/1/2017 9/1/2017 9/1/2017   Type HeartMate3 HeartMate3 HeartMate3 HeartMate3 HeartMate3 HeartMate3 HeartMate3   Flow 4.0 4.2 4.3 4.3 4.3 4.1 4.2   Speed 5200 5200 5200 5200 5200 5200 5200   PI 3.8 3.5 3.6 3.4 3.5 3.6 3.4   Power ([place]) 3.6  3.6 3.5 3.6 3.6 3.5 3.6   LSL - - - - - 4800 -   Pulsatility - - - - - - -   Some recent data might be hidden       Assessment:  [x]  Primary Cardiomyopathy [x]  Congestive Heart Failure   []  Atrial Fibrillation []  Ventricular Tachycardia   []  Aftercare cardiac device [x]  Long term (current) use of anticoagulants   []  Ventilator-associated pneumonia []  Pneumonia viral, unspecified   []  Pneumonia, bacterial, unspecified []  Pneumonia, organism unspecified   []  Hemorrhage of GI tract, unspecified    []  Nosebleed []  Driveline infection    [x]  Decubitus/sacral         Plan:  [x]  Interval history obtained from ICU attending team member during rounding today  [x]  VAD/MATT teaching performed with patient  [x]  Mobilization / Physical Therapy ongoing  [x]  Anticoagulation []  Ongoing [x]  Held    Increased Norvasc   Hypernatremia: (improving) D5W gtt decreased rate to 25ml/hr  Hypokalemia: replaced  Magnesium and phos replaced  Nutrition following   TPN re ordered and decreased rate to 30  Calorie counts  Wound care consulted  Specialty overlay bed  Continue inotropic support    Total time spent was 35 minutes.  Of which more than 50 percent of the care dominated counseling and coordinating care with different team members. The VAD was interrogated and all parameters were WNL and no significant findings were found in the history. All these findings are documented in the note above.      Date of Service: 09/02/2017

## 2017-09-02 NOTE — PROGRESS NOTES
[facility]  Heart Transplant  Progress Note    Patient Name: Suman Hayden  MRN: [id number]  Admission Date: 7/18/2017  Hospital Length of Stay: 46 days  Attending Physician: Sunny Downing MD  Primary Care Provider: Joe Ernst MD  Principal Problem:LVAD (left ventricular assist device) present    Subjective:     Interval History: No vomiting this AM. Nausea resolved. Feels much better. Walked  400 feet with PT.    Continuous Infusions:   dextrose 5 % 50 mL/hr at 09/02/17 0643    DOBUTamine 5 mcg/kg/min (09/01/17 0541)    TPN ADULT CENTRAL LINE CUSTOM 40 mL/hr at 09/01/17 2115     Scheduled Meds:   amlodipine  5 mg Oral Daily    ertapenem (INVANZ) IVPB  1 g Intravenous Q24H    k phos di & mono-sod phos mono  2 tablet Oral Once    lidocaine  1 patch Transdermal Q24H    magnesium oxide  400 mg Oral TID    pantoprazole  40 mg Intravenous BID    potassium chloride  10 mEq Intravenous Once    potassium chloride SA  40 mEq Oral BID    pravastatin  20 mg Oral QHS    sodium chloride 0.9%  10 mL Intravenous Q6H     PRN Meds:albumin human 5%, albuterol sulfate, bisacodyl, dextrose 50%, glucagon (human recombinant), hydrALAZINE, hydrocodone-acetaminophen 5-325mg, insulin aspart, ondansetron, Flushing PICC Protocol **AND** sodium chloride 0.9% **AND** sodium chloride 0.9%    Review of patient's allergies indicates:  No Known Allergies  Objective:     Vital Signs (Most Recent):  Temp: 98.6 °F (37 °C) (09/02/17 0415)  Pulse: 79 (09/02/17 0800)  Resp: 16 (09/02/17 0415)  BP: (!) 88/0 (09/02/17 0420)  SpO2: 96 % (09/02/17 0415) Vital Signs (24h Range):  Temp:  [98.2 °F (36.8 °C)-99.3 °F (37.4 °C)] 98.6 °F (37 °C)  Pulse:  [] 79  Resp:  [16-19] 16  SpO2:  [92 %-98 %] 96 %  BP: ()/(0-75) 88/0     Weight: 77.4 kg (170 lb 10.2 oz)  Body mass index is 25.95 kg/m².      Intake/Output Summary (Last 24 hours) at 09/02/17 0851  Last data filed at 09/02/17 0600   Gross per 24 hour   Intake              1896 ml   Output             1000 ml   Net              896 ml     Physical Exam   Constitutional: He is oriented to person, place, and time. He appears well-developed and well-nourished.   HENT:   Head: Normocephalic and atraumatic.   Eyes: EOM are normal. Pupils are equal, round, and reactive to light.   Neck: Normal range of motion. Neck supple.   Cardiovascular: Normal rate and regular rhythm.    No murmur heard.  VAD hum smooth   Pulmonary/Chest: Effort normal and breath sounds normal.   Abdominal: Soft. Bowel sounds are normal. He exhibits distension (mild). There is no tenderness. There is no guarding.   Musculoskeletal: Normal range of motion. He exhibits no edema.   Neurological: He is alert and oriented to person, place, and time.   Skin: Skin is warm and dry.   Nursing note and vitals reviewed.    Significant Labs:  CBC:    Recent Labs  Lab 09/02/17 0415   WBC 9.53   RBC 2.51*   HGB 7.0*   HCT 23.1*      MCV 92   MCH 27.9   MCHC 30.3*     BNP:    Recent Labs  Lab 09/01/17 0420   BNP 1,850*     CMP:    Recent Labs  Lab 09/01/17 0420 09/02/17 0415   GLU 98  98 83   CALCIUM 8.4*  8.4* 8.2*   ALBUMIN 2.2*  --    PROT 6.6  --    *  151* 147*   K 3.2*  3.2* 3.1*   CO2 31*  31* 27   *  112* 110   BUN 45*  45* 38*   CREATININE 1.7*  1.7* 1.5*   ALKPHOS 122  --    ALT 19  --    AST 24  --    BILITOT 1.3*  --       Coagulation:     Recent Labs  Lab 09/01/17 0420 09/02/17 0415   INR 5.1* 4.3*   APTT 37.5*  --      LDH:    Recent Labs  Lab 08/31/17 0420 09/01/17 0420 09/02/17 0415   * 282* 265*     Microbiology:  Microbiology Results (last 7 days)     Procedure Component Value Units Date/Time    Blood culture [[id number]] Collected:  08/29/17 0836    Order Status:  Completed Specimen:  Blood from Line, Jugular, Internal Right Updated:  09/01/17 1022     Blood Culture, Routine No Growth to date     Blood Culture, Routine No Growth to date     Blood Culture, Routine No  Growth to date     Blood Culture, Routine No Growth to date    Narrative:       FromTrialysis,prior to removing line.          Estimated Creatinine Clearance: 46.2 mL/min (based on SCr of 1.5 mg/dL (H)).        Assessment and Plan:     * LVAD (left ventricular assist device) present    -HeartMate 3 Implanted 7/27/2017 as DT   -s/p delayed chest closure (7/28/17) and extubated (7/29/17), reintubated 8/9/17 and extubated 8/13/17  -CTS Primary  -Implanted by Dr. Downing  -HOLD Coumadin, Goal INR 2.0-3.0. Supratherapeutic today again at 4.3.  -Antiplatelets  mg  -LDH is stable overall today. Will continue to monitor daily.  -Speed set at 5200 rpm rpm  -Interrogation notable for no events  -Not listed for OHTx           Stage III pressure ulcer of sacral region    - wound care        Hypernatremia    -Agree with starting D5W gtt        Bacteremia    - ESBL bacteremia.Cont ertapenem. Per ID- continue ertapenem x 4-6 weeks from 8/11. If discharged on ertapenem needs weekly cbc and cmp while on antibiotics (please fax results to ID clinic)  - blood cultures from 8/29 NGTD          Acute kidney injury superimposed on CKD    -Creatining improving daily        Atrial fibrillation    -AC, Amio per C TS          Hyperglycemia    -per primary team        Atrial tachycardia    - [id number] - 3  -Rec restarting Amio. AC per CTS        AICD discharge    - Appropriate in the setting of VT aggravated by underlying AT/AFL (earlier during the admission). Device reprogrammed to VVI 80 this admission          Hepatitis B core antibody positive since 2012              V-tach    - Recommend resuming Amio, NSVT on tele        Hyperlipidemia    - Please resume pravastatin once liver enzymes stabilize             Isabel Chen PA-C  Heart Transplant  [facility]

## 2017-09-02 NOTE — PLAN OF CARE
Problem: Patient Care Overview  Goal: Plan of Care Review  Outcome: Ongoing (interventions implemented as appropriate)  Pt free of falls/traumas/injuries. Skin remains clean, dry, and intact. Pt's 5% dextrose gtt decreased to 25 ml/hr. Heparin and TPN remain at prescribed rate. Pt's CBG Q 4 hrs. Pt encouraged to work in workbook.   Pt re-educated on importance of measuring accurate intake and out put; pt verbalized and demonstrates understanding. Reviewed plan of care with pt; and pt verbalized understanding.  Pt VSS in no distress will continue to monitor.

## 2017-09-02 NOTE — PROGRESS NOTES
Pt's repeat K is 3.3, attempted to call CTS on call. MD is in an emergency asked to call back later. Pt is stable, no complaints.

## 2017-09-02 NOTE — SUBJECTIVE & OBJECTIVE
Interval History: No vomiting this AM. Nausea resolved. Feels much better. Walked  400 feet with PT.    Continuous Infusions:   dextrose 5 % 50 mL/hr at 09/02/17 0643    DOBUTamine 5 mcg/kg/min (09/01/17 0541)    TPN ADULT CENTRAL LINE CUSTOM 40 mL/hr at 09/01/17 2115     Scheduled Meds:   amlodipine  5 mg Oral Daily    ertapenem (INVANZ) IVPB  1 g Intravenous Q24H    k phos di & mono-sod phos mono  2 tablet Oral Once    lidocaine  1 patch Transdermal Q24H    magnesium oxide  400 mg Oral TID    pantoprazole  40 mg Intravenous BID    potassium chloride  10 mEq Intravenous Once    potassium chloride SA  40 mEq Oral BID    pravastatin  20 mg Oral QHS    sodium chloride 0.9%  10 mL Intravenous Q6H     PRN Meds:albumin human 5%, albuterol sulfate, bisacodyl, dextrose 50%, glucagon (human recombinant), hydrALAZINE, hydrocodone-acetaminophen 5-325mg, insulin aspart, ondansetron, Flushing PICC Protocol **AND** sodium chloride 0.9% **AND** sodium chloride 0.9%    Review of patient's allergies indicates:  No Known Allergies  Objective:     Vital Signs (Most Recent):  Temp: 98.6 °F (37 °C) (09/02/17 0415)  Pulse: 79 (09/02/17 0800)  Resp: 16 (09/02/17 0415)  BP: (!) 88/0 (09/02/17 0420)  SpO2: 96 % (09/02/17 0415) Vital Signs (24h Range):  Temp:  [98.2 °F (36.8 °C)-99.3 °F (37.4 °C)] 98.6 °F (37 °C)  Pulse:  [] 79  Resp:  [16-19] 16  SpO2:  [92 %-98 %] 96 %  BP: ()/(0-75) 88/0     Weight: 77.4 kg (170 lb 10.2 oz)  Body mass index is 25.95 kg/m².      Intake/Output Summary (Last 24 hours) at 09/02/17 0851  Last data filed at 09/02/17 0600   Gross per 24 hour   Intake             1896 ml   Output             1000 ml   Net              896 ml     Physical Exam   Constitutional: He is oriented to person, place, and time. He appears well-developed and well-nourished.   HENT:   Head: Normocephalic and atraumatic.   Eyes: EOM are normal. Pupils are equal, round, and reactive to light.   Neck: Normal range of  motion. Neck supple.   Cardiovascular: Normal rate and regular rhythm.    No murmur heard.  VAD hum smooth   Pulmonary/Chest: Effort normal and breath sounds normal.   Abdominal: Soft. Bowel sounds are normal. He exhibits distension (mild). There is no tenderness. There is no guarding.   Musculoskeletal: Normal range of motion. He exhibits no edema.   Neurological: He is alert and oriented to person, place, and time.   Skin: Skin is warm and dry.   Nursing note and vitals reviewed.    Significant Labs:  CBC:    Recent Labs  Lab 09/02/17 0415   WBC 9.53   RBC 2.51*   HGB 7.0*   HCT 23.1*      MCV 92   MCH 27.9   MCHC 30.3*     BNP:    Recent Labs  Lab 09/01/17 0420   BNP 1,850*     CMP:    Recent Labs  Lab 09/01/17 0420 09/02/17 0415   GLU 98  98 83   CALCIUM 8.4*  8.4* 8.2*   ALBUMIN 2.2*  --    PROT 6.6  --    *  151* 147*   K 3.2*  3.2* 3.1*   CO2 31*  31* 27   *  112* 110   BUN 45*  45* 38*   CREATININE 1.7*  1.7* 1.5*   ALKPHOS 122  --    ALT 19  --    AST 24  --    BILITOT 1.3*  --       Coagulation:     Recent Labs  Lab 09/01/17 0420 09/02/17 0415   INR 5.1* 4.3*   APTT 37.5*  --      LDH:    Recent Labs  Lab 08/31/17 0420 09/01/17 0420 09/02/17 0415   * 282* 265*     Microbiology:  Microbiology Results (last 7 days)     Procedure Component Value Units Date/Time    Blood culture [[id number]] Collected:  08/29/17 0836    Order Status:  Completed Specimen:  Blood from Line, Jugular, Internal Right Updated:  09/01/17 1022     Blood Culture, Routine No Growth to date     Blood Culture, Routine No Growth to date     Blood Culture, Routine No Growth to date     Blood Culture, Routine No Growth to date    Narrative:       FromTrialysis,prior to removing line.          Estimated Creatinine Clearance: 46.2 mL/min (based on SCr of 1.5 mg/dL (H)).

## 2017-09-02 NOTE — PLAN OF CARE
Problem: Patient Care Overview  Goal: Plan of Care Review  Outcome: Ongoing (interventions implemented as appropriate)  Pt denies Chest pain, SOB or nausea or headache. VAD HM III-S&W daily.No falls, trauma or injury noted. VSS. VAD numbers WNL. R UA PICC line.  and TPN infusing. IV antibiotics. Dextrose 5% infusing.  Contact precautions continued. Accuchecks every 4 hrs, BG 120s-160s.  Encouraged pt to eat more. Boost supplement. AICD not working, de-fib pads at bedside all the time. Plan of care reviewed with patient. No further questions at this time. No significant events. Will continue to monitor.

## 2017-09-03 PROBLEM — N18.30 ACUTE RENAL FAILURE WITH ACUTE TUBULAR NECROSIS SUPERIMPOSED ON STAGE 3 CHRONIC KIDNEY DISEASE: Status: RESOLVED | Noted: 2017-08-14 | Resolved: 2017-09-03

## 2017-09-03 PROBLEM — N18.9 ACUTE KIDNEY INJURY SUPERIMPOSED ON CKD: Status: RESOLVED | Noted: 2017-08-11 | Resolved: 2017-09-03

## 2017-09-03 PROBLEM — N17.9 ACUTE KIDNEY INJURY SUPERIMPOSED ON CKD: Status: RESOLVED | Noted: 2017-08-11 | Resolved: 2017-09-03

## 2017-09-03 PROBLEM — E87.0 HYPERNATREMIA: Status: RESOLVED | Noted: 2017-08-27 | Resolved: 2017-09-03

## 2017-09-03 PROBLEM — N17.0 ACUTE RENAL FAILURE WITH ACUTE TUBULAR NECROSIS SUPERIMPOSED ON STAGE 3 CHRONIC KIDNEY DISEASE: Status: RESOLVED | Noted: 2017-08-14 | Resolved: 2017-09-03

## 2017-09-03 LAB
ANION GAP SERPL CALC-SCNC: 6 MMOL/L
BACTERIA BLD CULT: NORMAL
BASOPHILS # BLD AUTO: 0.04 K/UL
BASOPHILS NFR BLD: 0.4 %
BUN SERPL-MCNC: 31 MG/DL
CALCIUM SERPL-MCNC: 8 MG/DL
CHLORIDE SERPL-SCNC: 110 MMOL/L
CO2 SERPL-SCNC: 28 MMOL/L
CREAT SERPL-MCNC: 1.2 MG/DL
DIFFERENTIAL METHOD: ABNORMAL
EOSINOPHIL # BLD AUTO: 0.2 K/UL
EOSINOPHIL NFR BLD: 2.1 %
ERYTHROCYTE [DISTWIDTH] IN BLOOD BY AUTOMATED COUNT: 17.7 %
EST. GFR  (AFRICAN AMERICAN): >60 ML/MIN/1.73 M^2
EST. GFR  (NON AFRICAN AMERICAN): >60 ML/MIN/1.73 M^2
GLUCOSE SERPL-MCNC: 77 MG/DL
HCT VFR BLD AUTO: 22.8 %
HGB BLD-MCNC: 7 G/DL
INR PPP: 3.9
LDH SERPL L TO P-CCNC: 260 U/L
LYMPHOCYTES # BLD AUTO: 1.6 K/UL
LYMPHOCYTES NFR BLD: 14 %
MAGNESIUM SERPL-MCNC: 1.7 MG/DL
MCH RBC QN AUTO: 28 PG
MCHC RBC AUTO-ENTMCNC: 30.7 G/DL
MCV RBC AUTO: 91 FL
MONOCYTES # BLD AUTO: 0.7 K/UL
MONOCYTES NFR BLD: 6.4 %
NEUTROPHILS # BLD AUTO: 8.6 K/UL
NEUTROPHILS NFR BLD: 76.7 %
PHOSPHATE SERPL-MCNC: 1.9 MG/DL
PLATELET # BLD AUTO: 242 K/UL
PMV BLD AUTO: 11.8 FL
POCT GLUCOSE: 104 MG/DL (ref 70–110)
POCT GLUCOSE: 114 MG/DL (ref 70–110)
POCT GLUCOSE: 127 MG/DL (ref 70–110)
POCT GLUCOSE: 91 MG/DL (ref 70–110)
POTASSIUM SERPL-SCNC: 3.8 MMOL/L
PROTHROMBIN TIME: 40 SEC
RBC # BLD AUTO: 2.5 M/UL
SODIUM SERPL-SCNC: 144 MMOL/L
WBC # BLD AUTO: 11.18 K/UL

## 2017-09-03 PROCEDURE — 25000003 PHARM REV CODE 250: Performed by: THORACIC SURGERY (CARDIOTHORACIC VASCULAR SURGERY)

## 2017-09-03 PROCEDURE — 80048 BASIC METABOLIC PNL TOTAL CA: CPT

## 2017-09-03 PROCEDURE — 63600175 PHARM REV CODE 636 W HCPCS: Performed by: THORACIC SURGERY (CARDIOTHORACIC VASCULAR SURGERY)

## 2017-09-03 PROCEDURE — 63600175 PHARM REV CODE 636 W HCPCS: Performed by: NURSE PRACTITIONER

## 2017-09-03 PROCEDURE — 97110 THERAPEUTIC EXERCISES: CPT

## 2017-09-03 PROCEDURE — 63600175 PHARM REV CODE 636 W HCPCS: Performed by: STUDENT IN AN ORGANIZED HEALTH CARE EDUCATION/TRAINING PROGRAM

## 2017-09-03 PROCEDURE — 85610 PROTHROMBIN TIME: CPT

## 2017-09-03 PROCEDURE — 97530 THERAPEUTIC ACTIVITIES: CPT

## 2017-09-03 PROCEDURE — 83735 ASSAY OF MAGNESIUM: CPT

## 2017-09-03 PROCEDURE — 25000003 PHARM REV CODE 250: Performed by: NURSE PRACTITIONER

## 2017-09-03 PROCEDURE — A4216 STERILE WATER/SALINE, 10 ML: HCPCS | Performed by: THORACIC SURGERY (CARDIOTHORACIC VASCULAR SURGERY)

## 2017-09-03 PROCEDURE — 85025 COMPLETE CBC W/AUTO DIFF WBC: CPT

## 2017-09-03 PROCEDURE — 83615 LACTATE (LD) (LDH) ENZYME: CPT

## 2017-09-03 PROCEDURE — 27000248 HC VAD-ADDITIONAL DAY

## 2017-09-03 PROCEDURE — C9113 INJ PANTOPRAZOLE SODIUM, VIA: HCPCS | Performed by: NURSE PRACTITIONER

## 2017-09-03 PROCEDURE — 99232 SBSQ HOSP IP/OBS MODERATE 35: CPT | Mod: ,,, | Performed by: INTERNAL MEDICINE

## 2017-09-03 PROCEDURE — 20600001 HC STEP DOWN PRIVATE ROOM

## 2017-09-03 PROCEDURE — 63600175 PHARM REV CODE 636 W HCPCS: Performed by: GENERAL PRACTICE

## 2017-09-03 PROCEDURE — 84100 ASSAY OF PHOSPHORUS: CPT

## 2017-09-03 PROCEDURE — 63600175 PHARM REV CODE 636 W HCPCS: Performed by: PHYSICIAN ASSISTANT

## 2017-09-03 PROCEDURE — 25000003 PHARM REV CODE 250: Performed by: STUDENT IN AN ORGANIZED HEALTH CARE EDUCATION/TRAINING PROGRAM

## 2017-09-03 PROCEDURE — 97116 GAIT TRAINING THERAPY: CPT

## 2017-09-03 RX ORDER — FUROSEMIDE 10 MG/ML
20 INJECTION INTRAMUSCULAR; INTRAVENOUS 2 TIMES DAILY
Status: DISCONTINUED | OUTPATIENT
Start: 2017-09-03 | End: 2017-09-06

## 2017-09-03 RX ADMIN — DIBASIC SODIUM PHOSPHATE, MONOBASIC POTASSIUM PHOSPHATE AND MONOBASIC SODIUM PHOSPHATE 2 TABLET: 852; 155; 130 TABLET ORAL at 09:09

## 2017-09-03 RX ADMIN — MAGNESIUM OXIDE TAB 400 MG (241.3 MG ELEMENTAL MG) 400 MG: 400 (241.3 MG) TAB at 05:09

## 2017-09-03 RX ADMIN — AMLODIPINE BESYLATE 10 MG: 10 TABLET ORAL at 08:09

## 2017-09-03 RX ADMIN — MAGNESIUM OXIDE TAB 400 MG (241.3 MG ELEMENTAL MG) 400 MG: 400 (241.3 MG) TAB at 02:09

## 2017-09-03 RX ADMIN — WARFARIN SODIUM 0.5 MG: 1 TABLET ORAL at 04:09

## 2017-09-03 RX ADMIN — POTASSIUM CHLORIDE 40 MEQ: 1500 TABLET, EXTENDED RELEASE ORAL at 09:09

## 2017-09-03 RX ADMIN — HYDROCODONE BITARTRATE AND ACETAMINOPHEN 1 TABLET: 5; 325 TABLET ORAL at 03:09

## 2017-09-03 RX ADMIN — ERTAPENEM SODIUM 1 G: 1 INJECTION, POWDER, LYOPHILIZED, FOR SOLUTION INTRAMUSCULAR; INTRAVENOUS at 09:09

## 2017-09-03 RX ADMIN — Medication 10 ML: at 11:09

## 2017-09-03 RX ADMIN — ONDANSETRON 4 MG: 2 INJECTION INTRAMUSCULAR; INTRAVENOUS at 11:09

## 2017-09-03 RX ADMIN — HYDRALAZINE HYDROCHLORIDE 10 MG: 20 INJECTION INTRAMUSCULAR; INTRAVENOUS at 04:09

## 2017-09-03 RX ADMIN — LIDOCAINE 1 PATCH: 50 PATCH TOPICAL at 11:09

## 2017-09-03 RX ADMIN — DOBUTAMINE IN DEXTROSE 5 MCG/KG/MIN: 400 INJECTION, SOLUTION INTRAVENOUS at 04:09

## 2017-09-03 RX ADMIN — PRAVASTATIN SODIUM 20 MG: 20 TABLET ORAL at 09:09

## 2017-09-03 RX ADMIN — PANTOPRAZOLE SODIUM 40 MG: 40 INJECTION, POWDER, FOR SOLUTION INTRAVENOUS at 08:09

## 2017-09-03 RX ADMIN — POTASSIUM CHLORIDE 40 MEQ: 1500 TABLET, EXTENDED RELEASE ORAL at 08:09

## 2017-09-03 RX ADMIN — Medication 10 ML: at 05:09

## 2017-09-03 RX ADMIN — PANTOPRAZOLE SODIUM 40 MG: 40 INJECTION, POWDER, FOR SOLUTION INTRAVENOUS at 09:09

## 2017-09-03 RX ADMIN — MAGNESIUM OXIDE TAB 400 MG (241.3 MG ELEMENTAL MG) 400 MG: 400 (241.3 MG) TAB at 09:09

## 2017-09-03 RX ADMIN — RETINOL, ERGOCALCIFEROL, .ALPHA.-TOCOPHEROL ACETATE, DL-, PHYTONADIONE, ASCORBIC ACID, NIACINAMIDE, RIBOFLAVIN 5-PHOSPHATE SODIUM, THIAMINE HYDROCHLORIDE, PYRIDOXINE HYDROCHLORIDE, DEXPANTHENOL, BIOTIN, FOLIC ACID, AND CYANOCOBALAMIN: KIT at 11:09

## 2017-09-03 RX ADMIN — FUROSEMIDE 20 MG: 10 INJECTION, SOLUTION INTRAMUSCULAR; INTRAVENOUS at 05:09

## 2017-09-03 NOTE — PLAN OF CARE
Problem: Patient Care Overview  Goal: Plan of Care Review  Outcome: Ongoing (interventions implemented as appropriate)  Pt free of falls/traumas/injuries. Skin remains clean, dry, and intact. VAD numbers WDL. DP WDL. Pt participated with physical therapy and walked the entire block. Pt and wife continue to watch DVD and review workbook. Pt re-educated on importance of measuring accurate intake and out put; pt verbalized and demonstrates understanding. Reviewed plan of care with pt; and pt verbalized understanding.  Pt VSS in no distress will continue to monitor.

## 2017-09-03 NOTE — PT/OT/SLP PROGRESS
"Occupational Therapy  Treatment    Suman Hayden   MRN: [id number]   Admitting Diagnosis: LVAD (left ventricular assist device) present    OT Date of Treatment: 09/03/17   OT Start Time: 1345  OT Stop Time: 1410  OT Total Time (min): 25 min    Billable Minutes:  Therapeutic Exercise 25    General Precautions: Standard, sternal, LVAD, fall, contact  Orthopedic Precautions: N/A    Subjective:  Communicated with nsg prior to session.  "I'm doing alright" pt reports.   Pain/Comfort  Pain Rating 1: 0/10  Pt reports pain on left side comes/goes. Pt denied pain at time of OT arrival.     Objective:   Pt found in poor position in b/s chair with LVAD to battery power and spouse in room.      Functional Mobility:  Pt found seated in b/s chair this afternoon after PT session. Pt dressed, on battery power and having already completed g/h skills.    Pt completed B UE/LE AROM exs 10-20 reps each plane. Pt also completed yellow t-putty exs which he reports he has not been performing daily. OT provided education to pt/spouse re: importance of continued HEP including putty exs. Pt demo good understanding and performed exs with good effort.         AM-PAC 6 CLICK ADL   How much help from another person does this patient currently need?   1 = Unable, Total/Dependent Assistance  2 = A lot, Maximum/Moderate Assistance  3 = A little, Minimum/Contact Guard/Supervision  4 = None, Modified [place]/Independent    Putting on and taking off regular lower body clothing? : 1  Bathing (including washing, rinsing, drying)?: 1  Toileting, which includes using toilet, bedpan, or urinal? : 1  Putting on and taking off regular upper body clothing?: 1  Taking care of personal grooming such as brushing teeth?: 1  Eating meals?: 1  Total Score: 6     AM-PAC Raw Score CMS "G-Code Modifier Level of Impairment Assistance   6 % Total / Unable   7 - 8 CM 80 - 100% Maximal Assist   9-13 CL 60 - 80% Moderate Assist   14 - 19 CK 40 - 60% Moderate Assist "   20 - 22 CJ 20 - 40% Minimal Assist   23 CI 1-20% SBA / CGA   24 CH 0% Independent/ Mod I       Patient left up in chair with all lines intact, nsg notified and spouse present    ASSESSMENT:  Suman Hayden is a 67 y.o. male with a medical diagnosis of LVAD (left ventricular assist device) present and tolerated session well with good effort and performance. Pt to benefit from cont OT to address stated goals.  Pt continues to demo decreased functional endurance and strength limiting mobility and ADL skills. .    Rehab identified problem list/impairments: Rehab identified problem list/impairments: weakness, impaired functional mobilty, impaired balance, gait instability, impaired self care skills, impaired endurance    Rehab potential is good.    Activity tolerance: Good    Discharge recommendations: Discharge Facility/Level Of Care Needs: rehabilitation facility         GOALS:    Occupational Therapy Goals        Problem: Occupational Therapy Goal    Goal Priority Disciplines Outcome Interventions   Occupational Therapy Goal     OT, PT/OT Ongoing (interventions implemented as appropriate)    Description:  Goals to be met by:  2 weeks 9/12/17    Patient will increase functional independence with ADLs by performing:  Feeding: Independent   UE Dressing with Supervision.  LE Dressing with Supervision.  Grooming while standing with Supervision.  Toileting from toilet with Supervision for hygiene and clothing management.   Stand pivot transfers with Supervision.  Toilet transfer to toilet with Supervision.  Pt will be supervision  with LVAD yasmin't.     Goals to be met by:  2 weeks 8/28/17    Patient will increase functional independence with ADLs by performing:  Feeding: Independent   UE Dressing with Supervision.  LE Dressing with Supervision.  Grooming while standing with Supervision.  Toileting from toilet with Supervision for hygiene and clothing management.   Stand pivot transfers with Supervision.  Toilet transfer to  toilet with Supervision.  Pt will be supervision  with LVAD yasmin't.                   Multidisciplinary Problems (Resolved)        Problem: Occupational Therapy Goal    Goal Priority Disciplines Outcome Interventions   Occupational Therapy Goal   (Resolved)     OT, PT/OT  Error    Description:  Goals to be met by: 8/04/2017    Patient will increase functional independence with ADLs by performing:    Increased functional strength to 5/5 for improved ADL performance.  Upper extremity exercise program and R LE ankle pumps  3x 10 reps per handout, with independence.                      Plan:  Patient to be seen 6 x/week to address the above listed problems via self-care/home management, therapeutic activities, therapeutic exercises  Plan of Care expires: 08/29/17  Plan of Care reviewed with: patient, spouse         DELMY Navarro  09/03/2017

## 2017-09-03 NOTE — SUBJECTIVE & OBJECTIVE
Interval History: No nausea/vomiting over the last 24 hours. Feels much better. Getting dressed with wife.    Continuous Infusions:   dextrose 5 % 25 mL/hr at 09/02/17 1349    DOBUTamine 5 mcg/kg/min (09/03/17 0403)    TPN ADULT CENTRAL LINE CUSTOM 30 mL/hr at 09/02/17 2144    TPN ADULT CENTRAL LINE CUSTOM       Scheduled Meds:   amlodipine  10 mg Oral Daily    ertapenem (INVANZ) IVPB  1 g Intravenous Q24H    lidocaine  1 patch Transdermal Q24H    magnesium oxide  400 mg Oral TID    pantoprazole  40 mg Intravenous BID    potassium chloride SA  40 mEq Oral BID    pravastatin  20 mg Oral QHS    sodium chloride 0.9%  10 mL Intravenous Q6H     PRN Meds:albumin human 5%, albuterol sulfate, bisacodyl, dextrose 50%, glucagon (human recombinant), hydrALAZINE, hydrocodone-acetaminophen 5-325mg, insulin aspart, ondansetron, Flushing PICC Protocol **AND** sodium chloride 0.9% **AND** sodium chloride 0.9%    Review of patient's allergies indicates:  No Known Allergies  Objective:     Vital Signs (Most Recent):  Temp: 98.6 °F (37 °C) (09/03/17 0816)  Pulse: 82 (09/03/17 1100)  Resp: 18 (09/03/17 0816)  BP: (!) 119/59 (09/03/17 0816)  SpO2: 98 % (09/03/17 0816) Vital Signs (24h Range):  Temp:  [97.8 °F (36.6 °C)-98.8 °F (37.1 °C)] 98.6 °F (37 °C)  Pulse:  [64-88] 82  Resp:  [18] 18  SpO2:  [94 %-98 %] 98 %  BP: ()/(0-73) 119/59     Weight: 77.4 kg (170 lb 10.2 oz)  Body mass index is 25.95 kg/m².      Intake/Output Summary (Last 24 hours) at 09/03/17 1155  Last data filed at 09/03/17 0823   Gross per 24 hour   Intake             1080 ml   Output             1175 ml   Net              -95 ml     Physical Exam   Constitutional: He is oriented to person, place, and time. He appears well-developed and well-nourished.   HENT:   Head: Normocephalic and atraumatic.   Eyes: EOM are normal. Pupils are equal, round, and reactive to light.   Neck: Normal range of motion. Neck supple. JVD present.   Cardiovascular: Normal  rate and regular rhythm.    No murmur heard.  VAD hum smooth   Pulmonary/Chest: Effort normal and breath sounds normal.   Abdominal: Soft. Bowel sounds are normal. He exhibits no distension. There is no tenderness. There is no guarding.   Musculoskeletal: Normal range of motion. He exhibits no edema.   Neurological: He is alert and oriented to person, place, and time.   Skin: Skin is warm and dry.   Nursing note and vitals reviewed.    Significant Labs:  CBC:    Recent Labs  Lab 09/03/17 0330   WBC 11.18   RBC 2.50*   HGB 7.0*   HCT 22.8*      MCV 91   MCH 28.0   MCHC 30.7*     BNP:    Recent Labs  Lab 09/01/17 0420   BNP 1,850*     CMP:    Recent Labs  Lab 09/01/17 0420 09/03/17 0330   GLU 98  98  < > 77   CALCIUM 8.4*  8.4*  < > 8.0*   ALBUMIN 2.2*  --   --    PROT 6.6  --   --    *  151*  < > 144   K 3.2*  3.2*  < > 3.8   CO2 31*  31*  < > 28   *  112*  < > 110   BUN 45*  45*  < > 31*   CREATININE 1.7*  1.7*  < > 1.2   ALKPHOS 122  --   --    ALT 19  --   --    AST 24  --   --    BILITOT 1.3*  --   --    < > = values in this interval not displayed.   Coagulation:     Recent Labs  Lab 09/01/17 0420 09/03/17 0330   INR 5.1*  < > 3.9*   APTT 37.5*  --   --    < > = values in this interval not displayed.  LDH:    Recent Labs  Lab 09/01/17 0420 09/02/17  0415 09/03/17  0330   * 265* 260     Microbiology:  Microbiology Results (last 7 days)     Procedure Component Value Units Date/Time    Blood culture [[id number]] Collected:  08/29/17 0836    Order Status:  Completed Specimen:  Blood from Line, Jugular, Internal Right Updated:  09/03/17 1022     Blood Culture, Routine No growth after 5 days.    Narrative:       FromTrialysis,prior to removing line.          Estimated Creatinine Clearance: 57.8 mL/min (based on SCr of 1.2 mg/dL).

## 2017-09-03 NOTE — PT/OT/SLP PROGRESS
Physical Therapy  Treatment    Suman Hayden   MRN: [id number]   Admitting Diagnosis: LVAD (left ventricular assist device) present    PT Received On: 09/03/17  PT Start Time: 1034     PT Stop Time: 1142    PT Total Time (min): 68 min       Billable Minutes:  Gait [id number] and Therapeutic Activity 15    Treatment Type: Treatment  PT/PTA: PT     PTA Visit Number: 0       General Precautions: Standard, LVAD, fall, sternal, contact  Orthopedic Precautions: N/A   Braces: N/A    Do you have any cultural, spiritual, Quaker conflicts, given your current situation?: none noted     Subjective:  Communicated with RN prior to session.  Pt agreeable to therapy.   Therapy tech Mary present throughout session.     Pain/Comfort  Pain Rating 1:  (did not rate)  Location - Side 1: Left  Location 1: chest  Pain Addressed 1: Distraction, Reposition  Pain Rating 2:  (did not rate)  Location - Side 2: Bilateral  Location 2: knee  Pain Addressed 2: Reposition, Distraction    Objective:   Patient found with: telemetry, PICC line (LVAD to battery power)    Functional Mobility:  Bed Mobility:   Supine to Sit:  (not performed 2* pt sitting UIC before and after treatment session)    Transfers:  Sit <> Stand Assistance: Maximum Assistance (with assist of 2; x8 reps)  Sit <> Stand Assistive Device:  ([employer] IV pole)    Max v/c and t/c during all sit<>stand trials on technique and importance of maintaining sternal precautions.     Gait:   Gait Distance: 32 ft. + 66 ft. + 106 ft. + 46 ft. + 58 ft. + 44 ft.   Assistance 1: Minimum assistance, Moderate assistance (for standing balance, appropriate weight-shifts, and forward progression of gait; + 2nd person for assist with IV pole management; + 3rd person for chair follow)  Gait Assistive Device:  ([employer] IV pole)  Gait Pattern: swing-through gait  Gait Deviation(s): excessive knee flexion, decreased hal, increased time in double stance, decreased velocity of limb motion, decreased step  length, decreased weight-shifting ability, decreased toe-to-floor clearance (narrow ARVIN; decreased hip extension BLE)   Emergency bag present throughout    Chair follow throughout with seated rest breaks between each ambulation trial. Multiple intermittent standing rest breaks throughout.    Required Pio at the beginning of each ambulation trial, but progressed to modA towards the end of each trial with decline in postural control noted. Max v/c and t/c for weight-shifts, forward progression, increased hip and knee ext., and maintaining close proximity to AD.    Pt with increased fatigue at the end of gait training, requiring return to room via chair.     Balance:   Static Sit: SBA  Dynamic Sit: CGA-Pio  Static Stand: CGA-Pio  Dynamic stand: min-modA     Therapeutic Activities and Exercises:  Pt found on battery power with vest donned. However vest on incorrectly, so required maxA to don vest properly. Education provided throughout to pt and wife. Pt with limited understanding of importance of donning vest correctly and securing controller to person with pt impatient and anxious to begin ambulation.   Reviewed sternal precautions and importance of maintaining throughout mobility. Pt verbalized understanding but needs further reinforcement.   Performed ambulation as described above.      AM-PAC 6 CLICK MOBILITY  How much help from another person does this patient currently need?   1 = Unable, Total/Dependent Assistance  2 = A lot, Maximum/Moderate Assistance  3 = A little, Minimum/Contact Guard/Supervision  4 = None, Modified [place]/Independent    Turning over in bed (including adjusting bedclothes, sheets and blankets)?: 3  Sitting down on and standing up from a chair with arms (e.g., wheelchair, bedside commode, etc.): 2  Moving from lying on back to sitting on the side of the bed?: 3  Moving to and from a bed to a chair (including a wheelchair)?: 3  Need to walk in hospital room?: 2  Climbing 3-5 steps  with a railing?: 1  Total Score: 14    AM-PAC Raw Score CMS G-Code Modifier Level of Impairment Assistance   6 % Total / Unable   7 - 9 CM 80 - 100% Maximal Assist   10 - 14 CL 60 - 80% Moderate Assist   15 - 19 CK 40 - 60% Moderate Assist   20 - 22 CJ 20 - 40% Minimal Assist   23 CI 1-20% SBA / CGA   24 CH 0% Independent/ Mod I     Patient left up in chair with all lines intact, call button in reach and pt's wife present.    Assessment:  Suman Hayden is a 67 y.o. male with a medical diagnosis of LVAD (left ventricular assist device) present, inserted 7/27/17. Pt able to perform ambulation in hallways with [employer] IV pole and assist. Pt with decreased endurance, requiring several intermittent rest breaks during gait. Pt has continued to tolerate gait training, but with decline in quality of gait compared to sessions earlier this week. Pt fatiguing more quickly and with noted LE weakness during gait, requiring increased assist and cues. Pt would continue to benefit from skilled acute PT in order to address current deficits and progress functional mobility.      Rehab identified problem list/impairments: Rehab identified problem list/impairments: weakness, impaired functional mobilty, decreased safety awareness, impaired cardiopulmonary response to activity, pain, gait instability, impaired endurance, decreased coordination, impaired balance, impaired self care skills, decreased lower extremity function, decreased upper extremity function    Rehab potential is good.    Activity tolerance: Good    Discharge recommendations: Discharge Facility/Level Of Care Needs: rehabilitation facility     Barriers to discharge: Barriers to Discharge: Decreased caregiver support (at current functional level)    Equipment recommendations: Equipment Needed After Discharge:  (TBD)     GOALS:    Physical Therapy Goals        Problem: Physical Therapy Goal    Goal Priority Disciplines Outcome Goal Variances Interventions    Physical Therapy Goal     PT/OT, PT Ongoing (interventions implemented as appropriate)     Description:  Goals to be met by: 17     Patient will increase functional independence with mobility by performin. Supine to sit with MInimal Assistance-  Met 2017  2. Sit to supine with MInimal Assistance - not met  3. Sit to stand transfer with Minimal Assistance- not met  4. Bed to chair transfer with Minimal Assistance- not met  5. Gait  x 50 feet with Minimal Assistance. -  Met 2017  6. Lower extremity exercise program x15 reps, with supervision, in order to increase LE strength and (I) with functional mobility. - not met  7.Pt mod I supine to sit- not met  8. Pt receive gait training ~ 220 ft with AD if needed and supervision- not met                          PLAN:    Patient to be seen 6 x/week  to address the above listed problems via gait training, therapeutic activities, therapeutic exercises  Plan of Care expires: 17  Plan of Care reviewed with: patient, spouse        Kely Willett, PT, DPT   9/3/2017  [phone]

## 2017-09-03 NOTE — PLAN OF CARE
Problem: Physical Therapy Goal  Goal: Physical Therapy Goal  Goals to be met by: 17     Patient will increase functional independence with mobility by performin. Supine to sit with MInimal Assistance-  Met 2017  2. Sit to supine with MInimal Assistance - not met  3. Sit to stand transfer with Minimal Assistance- not met  4. Bed to chair transfer with Minimal Assistance- not met  5. Gait  x 50 feet with Minimal Assistance. -  Met 2017  6. Lower extremity exercise program x15 reps, with supervision, in order to increase LE strength and (I) with functional mobility. - not met  7.Pt mod I supine to sit- not met  8. Pt receive gait training ~ 220 ft with AD if needed and supervision- not met         Outcome: Ongoing (interventions implemented as appropriate)  Goals reviewed and remain appropriate. Pt progressing towards goals.    Kely Willett, PT, DPT   9/3/2017  [phone]

## 2017-09-03 NOTE — ASSESSMENT & PLAN NOTE
-HeartMate 3 Implanted 7/27/2017 as DT   -s/p delayed chest closure (7/28/17) and extubated (7/29/17), reintubated 8/9/17 and extubated 8/13/17  -CTS Primary  -Implanted by Dr. Downing  -HOLD Coumadin, Goal INR 2.0-3.0. Supratherapeutic today again at 3.9  -Antiplatelets  mg  -LDH is stable overall today. Will continue to monitor daily.  -Speed set at 5200 rpm  -Interrogation notable for no events  -Not listed for OHTx

## 2017-09-03 NOTE — PLAN OF CARE
Problem: Patient Care Overview  Goal: Plan of Care Review  Outcome: Ongoing (interventions implemented as appropriate)  Pt denies Chest pain, SOB or nausea or headache. VAD HM III-S&W daily. Not checked off on Dsg change and alarms. Family still needs RN supervision on dsg change. No falls, trauma or injury noted. VSS. VAD numbers WNL. R UA PICC line.  and TPN infusing. IV antibiotics. Dextrose 5% infusing.  Contact precautions continued. Accuchecks every 4 hrs. Boost supplement. AICD not working, de-fib pads at bedside all the time. 40 mEq IV KCl replaced. Plan of care reviewed with patient. No further questions at this time. No significant events. Will continue to monitor.

## 2017-09-03 NOTE — PROGRESS NOTES
[facility]  Heart Transplant  Progress Note    Patient Name: Suman Hayden  MRN: [id number]  Admission Date: 7/18/2017  Hospital Length of Stay: 47 days  Attending Physician: Sunny Downing MD  Primary Care Provider: Joe Ernst MD  Principal Problem:LVAD (left ventricular assist device) present    Subjective:     Interval History: No nausea/vomiting over the last 24 hours. Feels much better. Getting dressed with wife.    Continuous Infusions:   dextrose 5 % 25 mL/hr at 09/02/17 1349    DOBUTamine 5 mcg/kg/min (09/03/17 0403)    TPN ADULT CENTRAL LINE CUSTOM 30 mL/hr at 09/02/17 2144    TPN ADULT CENTRAL LINE CUSTOM       Scheduled Meds:   amlodipine  10 mg Oral Daily    ertapenem (INVANZ) IVPB  1 g Intravenous Q24H    lidocaine  1 patch Transdermal Q24H    magnesium oxide  400 mg Oral TID    pantoprazole  40 mg Intravenous BID    potassium chloride SA  40 mEq Oral BID    pravastatin  20 mg Oral QHS    sodium chloride 0.9%  10 mL Intravenous Q6H     PRN Meds:albumin human 5%, albuterol sulfate, bisacodyl, dextrose 50%, glucagon (human recombinant), hydrALAZINE, hydrocodone-acetaminophen 5-325mg, insulin aspart, ondansetron, Flushing PICC Protocol **AND** sodium chloride 0.9% **AND** sodium chloride 0.9%    Review of patient's allergies indicates:  No Known Allergies  Objective:     Vital Signs (Most Recent):  Temp: 98.6 °F (37 °C) (09/03/17 0816)  Pulse: 82 (09/03/17 1100)  Resp: 18 (09/03/17 0816)  BP: (!) 119/59 (09/03/17 0816)  SpO2: 98 % (09/03/17 0816) Vital Signs (24h Range):  Temp:  [97.8 °F (36.6 °C)-98.8 °F (37.1 °C)] 98.6 °F (37 °C)  Pulse:  [64-88] 82  Resp:  [18] 18  SpO2:  [94 %-98 %] 98 %  BP: ()/(0-73) 119/59     Weight: 77.4 kg (170 lb 10.2 oz)  Body mass index is 25.95 kg/m².      Intake/Output Summary (Last 24 hours) at 09/03/17 1155  Last data filed at 09/03/17 0823   Gross per 24 hour   Intake             1080 ml   Output             1175 ml   Net               -95 ml     Physical Exam   Constitutional: He is oriented to person, place, and time. He appears well-developed and well-nourished.   HENT:   Head: Normocephalic and atraumatic.   Eyes: EOM are normal. Pupils are equal, round, and reactive to light.   Neck: Normal range of motion. Neck supple. JVD present.   Cardiovascular: Normal rate and regular rhythm.    No murmur heard.  VAD hum smooth   Pulmonary/Chest: Effort normal and breath sounds normal.   Abdominal: Soft. Bowel sounds are normal. He exhibits no distension. There is no tenderness. There is no guarding.   Musculoskeletal: Normal range of motion. He exhibits no edema.   Neurological: He is alert and oriented to person, place, and time.   Skin: Skin is warm and dry.   Nursing note and vitals reviewed.    Significant Labs:  CBC:    Recent Labs  Lab 09/03/17 0330   WBC 11.18   RBC 2.50*   HGB 7.0*   HCT 22.8*      MCV 91   MCH 28.0   MCHC 30.7*     BNP:    Recent Labs  Lab 09/01/17 0420   BNP 1,850*     CMP:    Recent Labs  Lab 09/01/17 0420 09/03/17 0330   GLU 98  98  < > 77   CALCIUM 8.4*  8.4*  < > 8.0*   ALBUMIN 2.2*  --   --    PROT 6.6  --   --    *  151*  < > 144   K 3.2*  3.2*  < > 3.8   CO2 31*  31*  < > 28   *  112*  < > 110   BUN 45*  45*  < > 31*   CREATININE 1.7*  1.7*  < > 1.2   ALKPHOS 122  --   --    ALT 19  --   --    AST 24  --   --    BILITOT 1.3*  --   --    < > = values in this interval not displayed.   Coagulation:     Recent Labs  Lab 09/01/17 0420 09/03/17 0330   INR 5.1*  < > 3.9*   APTT 37.5*  --   --    < > = values in this interval not displayed.  LDH:    Recent Labs  Lab 09/01/17 0420 09/02/17  0415 09/03/17 0330   * 265* 260     Microbiology:  Microbiology Results (last 7 days)     Procedure Component Value Units Date/Time    Blood culture [[id number]] Collected:  08/29/17 0836    Order Status:  Completed Specimen:  Blood from Line, Jugular, Internal Right Updated:  09/03/17 1022      Blood Culture, Routine No growth after 5 days.    Narrative:       FromTrialysis,prior to removing line.          Estimated Creatinine Clearance: 57.8 mL/min (based on SCr of 1.2 mg/dL).        Assessment and Plan:     * LVAD (left ventricular assist device) present    -HeartMate 3 Implanted 7/27/2017 as DT   -s/p delayed chest closure (7/28/17) and extubated (7/29/17), reintubated 8/9/17 and extubated 8/13/17  -CTS Primary  -Implanted by Dr. Downing  -HOLD Coumadin, Goal INR 2.0-3.0. Supratherapeutic today again at 3.9  -Antiplatelets  mg  -LDH is stable overall today. Will continue to monitor daily.  -Speed set at 5200 rpm  -Interrogation notable for no events  -Not listed for OHTx           Stage III pressure ulcer of sacral region    - wound care        Bacteremia    - ESBL bacteremia.Cont ertapenem. Per ID- continue ertapenem x 4-6 weeks from 8/11. If discharged on ertapenem needs weekly cbc and cmp while on antibiotics (please fax results to ID clinic)  - blood cultures from 8/29 NGTD          Atrial fibrillation    -AC, Amio per C TS          Hyperglycemia    -per primary team        Atrial tachycardia    - [id number] - 3  -Rec restarting Amio. AC per CTS        AICD discharge    - Appropriate in the setting of VT aggravated by underlying AT/AFL (earlier during the admission). Device reprogrammed to VVI 80 this admission          Hepatitis B core antibody positive since 2012              V-tach    - Recommend resuming Amio, NSVT on tele        Hyperlipidemia    -continue pravastatin            Abigail Green PA-C  Heart Transplant  [facility]

## 2017-09-04 PROBLEM — I50.22 CHRONIC SYSTOLIC CONGESTIVE HEART FAILURE: Status: ACTIVE | Noted: 2017-07-07

## 2017-09-04 PROBLEM — I50.9 HEART FAILURE: Status: RESOLVED | Noted: 2017-07-28 | Resolved: 2017-09-04

## 2017-09-04 LAB
ALBUMIN SERPL BCP-MCNC: 2.2 G/DL
ALP SERPL-CCNC: 115 U/L
ALT SERPL W/O P-5'-P-CCNC: 15 U/L
ANION GAP SERPL CALC-SCNC: 7 MMOL/L
AST SERPL-CCNC: 20 U/L
BASOPHILS # BLD AUTO: 0.04 K/UL
BASOPHILS NFR BLD: 0.4 %
BILIRUB DIRECT SERPL-MCNC: 0.9 MG/DL
BILIRUB SERPL-MCNC: 1.3 MG/DL
BNP SERPL-MCNC: 2259 PG/ML
BUN SERPL-MCNC: 29 MG/DL
CALCIUM SERPL-MCNC: 8.1 MG/DL
CHLORIDE SERPL-SCNC: 111 MMOL/L
CO2 SERPL-SCNC: 25 MMOL/L
CREAT SERPL-MCNC: 1.2 MG/DL
CRP SERPL-MCNC: 21.8 MG/L
DIFFERENTIAL METHOD: ABNORMAL
EOSINOPHIL # BLD AUTO: 0.3 K/UL
EOSINOPHIL NFR BLD: 2.7 %
ERYTHROCYTE [DISTWIDTH] IN BLOOD BY AUTOMATED COUNT: 17.7 %
EST. GFR  (AFRICAN AMERICAN): >60 ML/MIN/1.73 M^2
EST. GFR  (NON AFRICAN AMERICAN): >60 ML/MIN/1.73 M^2
GLUCOSE SERPL-MCNC: 75 MG/DL
HCT VFR BLD AUTO: 24.1 %
HGB BLD-MCNC: 7.3 G/DL
INR PPP: 3.4
LDH SERPL L TO P-CCNC: 275 U/L
LYMPHOCYTES # BLD AUTO: 1.5 K/UL
LYMPHOCYTES NFR BLD: 14 %
MAGNESIUM SERPL-MCNC: 1.8 MG/DL
MCH RBC QN AUTO: 28 PG
MCHC RBC AUTO-ENTMCNC: 30.3 G/DL
MCV RBC AUTO: 92 FL
MONOCYTES # BLD AUTO: 0.8 K/UL
MONOCYTES NFR BLD: 7 %
NEUTROPHILS # BLD AUTO: 8.3 K/UL
NEUTROPHILS NFR BLD: 75.6 %
PHOSPHATE SERPL-MCNC: 2.4 MG/DL
PLATELET # BLD AUTO: 230 K/UL
PMV BLD AUTO: 12 FL
POCT GLUCOSE: 111 MG/DL (ref 70–110)
POCT GLUCOSE: 78 MG/DL (ref 70–110)
POCT GLUCOSE: 87 MG/DL (ref 70–110)
POCT GLUCOSE: 89 MG/DL (ref 70–110)
POTASSIUM SERPL-SCNC: 4.4 MMOL/L
PREALB SERPL-MCNC: 13 MG/DL
PROT SERPL-MCNC: 6.6 G/DL
PROTHROMBIN TIME: 34.6 SEC
RBC # BLD AUTO: 2.61 M/UL
SODIUM SERPL-SCNC: 143 MMOL/L
WBC # BLD AUTO: 11 K/UL

## 2017-09-04 PROCEDURE — 25000003 PHARM REV CODE 250: Performed by: STUDENT IN AN ORGANIZED HEALTH CARE EDUCATION/TRAINING PROGRAM

## 2017-09-04 PROCEDURE — 63600175 PHARM REV CODE 636 W HCPCS: Performed by: NURSE PRACTITIONER

## 2017-09-04 PROCEDURE — 63600175 PHARM REV CODE 636 W HCPCS: Performed by: STUDENT IN AN ORGANIZED HEALTH CARE EDUCATION/TRAINING PROGRAM

## 2017-09-04 PROCEDURE — 25000003 PHARM REV CODE 250: Performed by: NURSE PRACTITIONER

## 2017-09-04 PROCEDURE — C9113 INJ PANTOPRAZOLE SODIUM, VIA: HCPCS | Performed by: NURSE PRACTITIONER

## 2017-09-04 PROCEDURE — 85610 PROTHROMBIN TIME: CPT

## 2017-09-04 PROCEDURE — A4217 STERILE WATER/SALINE, 500 ML: HCPCS | Performed by: STUDENT IN AN ORGANIZED HEALTH CARE EDUCATION/TRAINING PROGRAM

## 2017-09-04 PROCEDURE — 84100 ASSAY OF PHOSPHORUS: CPT

## 2017-09-04 PROCEDURE — 97110 THERAPEUTIC EXERCISES: CPT | Performed by: PHYSICAL THERAPIST

## 2017-09-04 PROCEDURE — 20600001 HC STEP DOWN PRIVATE ROOM

## 2017-09-04 PROCEDURE — 83880 ASSAY OF NATRIURETIC PEPTIDE: CPT

## 2017-09-04 PROCEDURE — 84134 ASSAY OF PREALBUMIN: CPT

## 2017-09-04 PROCEDURE — 83735 ASSAY OF MAGNESIUM: CPT

## 2017-09-04 PROCEDURE — 27000248 HC VAD-ADDITIONAL DAY

## 2017-09-04 PROCEDURE — 86140 C-REACTIVE PROTEIN: CPT

## 2017-09-04 PROCEDURE — 63600175 PHARM REV CODE 636 W HCPCS: Performed by: PHYSICIAN ASSISTANT

## 2017-09-04 PROCEDURE — 99232 SBSQ HOSP IP/OBS MODERATE 35: CPT | Mod: ,,, | Performed by: INTERNAL MEDICINE

## 2017-09-04 PROCEDURE — 97116 GAIT TRAINING THERAPY: CPT | Performed by: PHYSICAL THERAPIST

## 2017-09-04 PROCEDURE — 63600175 PHARM REV CODE 636 W HCPCS: Performed by: GENERAL PRACTICE

## 2017-09-04 PROCEDURE — 80076 HEPATIC FUNCTION PANEL: CPT

## 2017-09-04 PROCEDURE — 85025 COMPLETE CBC W/AUTO DIFF WBC: CPT

## 2017-09-04 PROCEDURE — A4216 STERILE WATER/SALINE, 10 ML: HCPCS | Performed by: THORACIC SURGERY (CARDIOTHORACIC VASCULAR SURGERY)

## 2017-09-04 PROCEDURE — 83615 LACTATE (LD) (LDH) ENZYME: CPT

## 2017-09-04 PROCEDURE — 25000003 PHARM REV CODE 250: Performed by: THORACIC SURGERY (CARDIOTHORACIC VASCULAR SURGERY)

## 2017-09-04 PROCEDURE — 80048 BASIC METABOLIC PNL TOTAL CA: CPT

## 2017-09-04 RX ADMIN — POTASSIUM CHLORIDE 40 MEQ: 1500 TABLET, EXTENDED RELEASE ORAL at 09:09

## 2017-09-04 RX ADMIN — MAGNESIUM OXIDE TAB 400 MG (241.3 MG ELEMENTAL MG) 400 MG: 400 (241.3 MG) TAB at 03:09

## 2017-09-04 RX ADMIN — FUROSEMIDE 20 MG: 10 INJECTION, SOLUTION INTRAMUSCULAR; INTRAVENOUS at 06:09

## 2017-09-04 RX ADMIN — AMLODIPINE BESYLATE 10 MG: 10 TABLET ORAL at 09:09

## 2017-09-04 RX ADMIN — PANTOPRAZOLE SODIUM 40 MG: 40 INJECTION, POWDER, FOR SOLUTION INTRAVENOUS at 09:09

## 2017-09-04 RX ADMIN — FUROSEMIDE 20 MG: 10 INJECTION, SOLUTION INTRAMUSCULAR; INTRAVENOUS at 09:09

## 2017-09-04 RX ADMIN — Medication 10 ML: at 05:09

## 2017-09-04 RX ADMIN — MAGNESIUM OXIDE TAB 400 MG (241.3 MG ELEMENTAL MG) 400 MG: 400 (241.3 MG) TAB at 05:09

## 2017-09-04 RX ADMIN — HYDRALAZINE HYDROCHLORIDE 10 MG: 20 INJECTION INTRAMUSCULAR; INTRAVENOUS at 04:09

## 2017-09-04 RX ADMIN — ERTAPENEM SODIUM 1 G: 1 INJECTION, POWDER, LYOPHILIZED, FOR SOLUTION INTRAMUSCULAR; INTRAVENOUS at 09:09

## 2017-09-04 RX ADMIN — RETINOL, ERGOCALCIFEROL, .ALPHA.-TOCOPHEROL ACETATE, DL-, PHYTONADIONE, ASCORBIC ACID, NIACINAMIDE, RIBOFLAVIN 5-PHOSPHATE SODIUM, THIAMINE HYDROCHLORIDE, PYRIDOXINE HYDROCHLORIDE, DEXPANTHENOL, BIOTIN, FOLIC ACID, AND CYANOCOBALAMIN: KIT at 10:09

## 2017-09-04 RX ADMIN — PRAVASTATIN SODIUM 20 MG: 20 TABLET ORAL at 09:09

## 2017-09-04 RX ADMIN — MAGNESIUM OXIDE TAB 400 MG (241.3 MG ELEMENTAL MG) 400 MG: 400 (241.3 MG) TAB at 09:09

## 2017-09-04 NOTE — PLAN OF CARE
Problem: Patient Care Overview  Goal: Plan of Care Review  Outcome: Ongoing (interventions implemented as appropriate)  Pt verbalizes no complaints. Denies CP, SOB, or other discomforts.  and TPN infusing as ordered. VSS. VAd numbers and dopplers wNL. Pt OOB in chair throughout shift. Pt wife performing dressing changes with RN supervision.  Pt remains free of fall or injury. Pt verbalizes understanding of plan of care. Will continue to monitor.

## 2017-09-04 NOTE — SUBJECTIVE & OBJECTIVE
Interval History: Nauseated this morning. Had two large BMs already today. Wife at beside, encouraging him. No additional complaints.     Continuous Infusions:   dextrose 5 % 15 mL/hr at 09/03/17 1453    DOBUTamine 5 mcg/kg/min (09/03/17 0403)    TPN ADULT CENTRAL LINE CUSTOM 30 mL/hr at 09/03/17 2307     Scheduled Meds:   amlodipine  10 mg Oral Daily    ertapenem (INVANZ) IVPB  1 g Intravenous Q24H    furosemide  20 mg Intravenous BID    lidocaine  1 patch Transdermal Q24H    magnesium oxide  400 mg Oral TID    pantoprazole  40 mg Intravenous BID    potassium chloride SA  40 mEq Oral BID    pravastatin  20 mg Oral QHS    sodium chloride 0.9%  10 mL Intravenous Q6H     PRN Meds:albumin human 5%, albuterol sulfate, bisacodyl, dextrose 50%, glucagon (human recombinant), hydrALAZINE, hydrocodone-acetaminophen 5-325mg, insulin aspart, ondansetron, Flushing PICC Protocol **AND** sodium chloride 0.9% **AND** sodium chloride 0.9%    Review of patient's allergies indicates:  No Known Allergies  Objective:     Vital Signs (Most Recent):  Temp: 97.9 °F (36.6 °C) (09/04/17 0901)  Pulse: 86 (09/04/17 1100)  Resp: 18 (09/04/17 0901)  BP: (!) 72/0 (09/04/17 0901)  SpO2: 98 % (09/04/17 0901) Vital Signs (24h Range):  Temp:  [97.6 °F (36.4 °C)-98.8 °F (37.1 °C)] 97.9 °F (36.6 °C)  Pulse:  [58-90] 86  Resp:  [17-18] 18  SpO2:  [94 %-98 %] 98 %  BP: ()/(0-76) 72/0     Weight: 79 kg (174 lb 2.6 oz)  Body mass index is 26.48 kg/m².      Intake/Output Summary (Last 24 hours) at 09/04/17 1147  Last data filed at 09/04/17 0600   Gross per 24 hour   Intake              850 ml   Output              400 ml   Net              450 ml     Physical Exam   Constitutional: He is oriented to person, place, and time. He appears well-developed and well-nourished.   HENT:   Head: Normocephalic and atraumatic.   Eyes: EOM are normal. Pupils are equal, round, and reactive to light.   Neck: Normal range of motion. Neck supple. JVD  present.   Cardiovascular: Normal rate and regular rhythm.    No murmur heard.  VAD hum smooth   Pulmonary/Chest: Effort normal and breath sounds normal.   Abdominal: Soft. Bowel sounds are normal. He exhibits no distension. There is no tenderness. There is no guarding.   Musculoskeletal: Normal range of motion. He exhibits no edema.   Neurological: He is alert and oriented to person, place, and time.   Skin: Skin is warm and dry.   Nursing note and vitals reviewed.    Significant Labs:  CBC:    Recent Labs  Lab 09/04/17  0325   WBC 11.00   RBC 2.61*   HGB 7.3*   HCT 24.1*      MCV 92   MCH 28.0   MCHC 30.3*     BNP:    Recent Labs  Lab 09/04/17  0325   BNP 2,259*     CMP:    Recent Labs  Lab 09/04/17 0325   GLU 75   CALCIUM 8.1*   ALBUMIN 2.2*   PROT 6.6      K 4.4   CO2 25   *   BUN 29*   CREATININE 1.2   ALKPHOS 115   ALT 15   AST 20   BILITOT 1.3*      Coagulation:     Recent Labs  Lab 09/04/17  0325   INR 3.4*     LDH:    Recent Labs  Lab 09/02/17  0415 09/03/17  0330 09/04/17  0325   * 260 275*     Microbiology:  Microbiology Results (last 7 days)     Procedure Component Value Units Date/Time    Blood culture [[id number]] Collected:  08/29/17 0836    Order Status:  Completed Specimen:  Blood from Line, Jugular, Internal Right Updated:  09/03/17 1022     Blood Culture, Routine No growth after 5 days.    Narrative:       FromTrialysis,prior to removing line.          Estimated Creatinine Clearance: 57.8 mL/min (based on SCr of 1.2 mg/dL).

## 2017-09-04 NOTE — NURSING
Pt had no significant events overnight. Pt receiving antibiotics IV. Pt also receiving TPN to aide in  Nutrition. Pt wife stays at bedside as motivator and coping. Pt encourage to call when needing to ambulate to reduce risk of fall. Pt encourage to turn often to prevent pressure ulcers from getting worse. Pt is also getting dobutamine to help with heart contractility.

## 2017-09-04 NOTE — PT/OT/SLP PROGRESS
Physical Therapy  Treatment    Suman Hayden   MRN: [id number]   Admitting Diagnosis: LVAD (left ventricular assist device) present    PT Received On: 09/04/17  PT Start Time: 1122     PT Stop Time: 1200    PT Total Time (min): 38 min       Billable Minutes:  Gait [id number] and Therapeutic Exercise 13    Treatment Type: Treatment  PT/PTA: PT     PTA Visit Number: 0       General Precautions: Standard, LVAD, fall, sternal  Orthopedic Precautions: N/A   Braces: N/A    Do you have any cultural, spiritual, Lutheran conflicts, given your current situation?: none noted     Subjective:  Communicated with RN prior to session.           Objective:   Patient found with: telemetry, peripheral IV    Functional Mobility:  Bed Mobility:        Transfers:  Sit <> Stand Assistance: Moderate Assistance, Maximum Assistance (pt required cued to minimize use of UEs and to weight shift forward in order to better elevate from chair)  Sit <> Stand Assistive Device: No Assistive Device    Gait:   Gait Distance: Pt ambulated a total of 450ft requiring 6 seated rest breaks over ellen course of the gait trial of approximately 1-3 minutes each.  pt required encouragement and cues for position within the walker, head position, and step sequencing.    Assistance 1: Minimum assistance ((2nd person following with chair))  Gait Assistive Device:  (Living good IV pole)  Gait Pattern: swing-through gait  Gait Deviation(s): decreased hal, decreased step length, decreased velocity of limb motion    Balance:   Static Sit: FAIR: Maintains without assist, but unable to take any challenges   Dynamic Sit: FAIR: Cannot move trunk without losing balance  Static Stand: FAIR: Maintains without assist but unable to take challenges  Dynamic stand: POOR: N/A     Therapeutic Activities and Exercises:  Pt found up in chair with LVAD to battery power having already performed self test and log book earlier in the day.  Pt performed seated LAQ, hip flexion, AP, and hip  abd/add x20 reps each seated in chair  Pt educated on safety with mobility throughotu and during gait trials emergency bag was present.    Pt's spouse present throughout the treatment and provided encouragement and was educated on safety.      AM-PAC 6 CLICK MOBILITY  How much help from another person does this patient currently need?   1 = Unable, Total/Dependent Assistance  2 = A lot, Maximum/Moderate Assistance  3 = A little, Minimum/Contact Guard/Supervision  4 = None, Modified [place]/Independent    Turning over in bed (including adjusting bedclothes, sheets and blankets)?: 3  Sitting down on and standing up from a chair with arms (e.g., wheelchair, bedside commode, etc.): 2  Moving from lying on back to sitting on the side of the bed?: 3  Moving to and from a bed to a chair (including a wheelchair)?: 3  Need to walk in hospital room?: 2  Climbing 3-5 steps with a railing?: 1  Total Score: 14    AM-PAC Raw Score CMS G-Code Modifier Level of Impairment Assistance   6 % Total / Unable   7 - 9 CM 80 - 100% Maximal Assist   10 - 14 CL 60 - 80% Moderate Assist   15 - 19 CK 40 - 60% Moderate Assist   20 - 22 CJ 20 - 40% Minimal Assist   23 CI 1-20% SBA / CGA   24 CH 0% Independent/ Mod I     Patient left up in chair with all lines intact and call button in reach.    Assessment:  Suman Hayden is a 67 y.o. male with a medical diagnosis of LVAD (left ventricular assist device) present and presents with improving gait distance and balance.  He remains weak and will continue to require extensvie therapy to progress to his PLOF.  He will require IP rehab placement likely to maximize recovery..    Rehab identified problem list/impairments: Rehab identified problem list/impairments: weakness, impaired balance, impaired endurance, impaired functional mobilty, gait instability, decreased lower extremity function, impaired cardiopulmonary response to activity    Rehab potential is good.    Activity tolerance:  Good    Discharge recommendations: Discharge Facility/Level Of Care Needs: rehabilitation facility     Barriers to discharge: Barriers to Discharge: Decreased caregiver support    Equipment recommendations:       GOALS:    Physical Therapy Goals        Problem: Physical Therapy Goal    Goal Priority Disciplines Outcome Goal Variances Interventions   Physical Therapy Goal     PT/OT, PT Ongoing (interventions implemented as appropriate)     Description:  Goals to be met by: 17     Patient will increase functional independence with mobility by performin. Supine to sit with MInimal Assistance-  Met 2017  2. Sit to supine with MInimal Assistance - not met  3. Sit to stand transfer with Minimal Assistance- not met  4. Bed to chair transfer with Minimal Assistance- not met  5. Gait  x 50 feet with Minimal Assistance. -  Met 2017  6. Lower extremity exercise program x15 reps, with supervision, in order to increase LE strength and (I) with functional mobility. - not met  7.Pt mod I supine to sit- not met  8. Pt receive gait training ~ 220 ft with AD if needed and supervision- not met                          PLAN:    Patient to be seen 6 x/week  to address the above listed problems via gait training, therapeutic activities, therapeutic exercises  Plan of Care expires: 17  Plan of Care reviewed with: patient         Wilian Zarate, PT  2017

## 2017-09-04 NOTE — PROGRESS NOTES
[facility]  Heart Transplant  Progress Note    Patient Name: Suman Hayden  MRN: [id number]  Admission Date: 7/18/2017  Hospital Length of Stay: 48 days  Attending Physician: Sunny Downing MD  Primary Care Provider: Joe Ernst MD  Principal Problem:LVAD (left ventricular assist device) present    Subjective:     Interval History: Nauseated this morning. Had two large BMs already today. Wife at beside, encouraging him. No additional complaints.     Continuous Infusions:   dextrose 5 % 15 mL/hr at 09/03/17 1453    DOBUTamine 5 mcg/kg/min (09/03/17 0403)    TPN ADULT CENTRAL LINE CUSTOM 30 mL/hr at 09/03/17 2307     Scheduled Meds:   amlodipine  10 mg Oral Daily    ertapenem (INVANZ) IVPB  1 g Intravenous Q24H    furosemide  20 mg Intravenous BID    lidocaine  1 patch Transdermal Q24H    magnesium oxide  400 mg Oral TID    pantoprazole  40 mg Intravenous BID    potassium chloride SA  40 mEq Oral BID    pravastatin  20 mg Oral QHS    sodium chloride 0.9%  10 mL Intravenous Q6H     PRN Meds:albumin human 5%, albuterol sulfate, bisacodyl, dextrose 50%, glucagon (human recombinant), hydrALAZINE, hydrocodone-acetaminophen 5-325mg, insulin aspart, ondansetron, Flushing PICC Protocol **AND** sodium chloride 0.9% **AND** sodium chloride 0.9%    Review of patient's allergies indicates:  No Known Allergies  Objective:     Vital Signs (Most Recent):  Temp: 97.9 °F (36.6 °C) (09/04/17 0901)  Pulse: 86 (09/04/17 1100)  Resp: 18 (09/04/17 0901)  BP: (!) 72/0 (09/04/17 0901)  SpO2: 98 % (09/04/17 0901) Vital Signs (24h Range):  Temp:  [97.6 °F (36.4 °C)-98.8 °F (37.1 °C)] 97.9 °F (36.6 °C)  Pulse:  [58-90] 86  Resp:  [17-18] 18  SpO2:  [94 %-98 %] 98 %  BP: ()/(0-76) 72/0     Weight: 79 kg (174 lb 2.6 oz)  Body mass index is 26.48 kg/m².      Intake/Output Summary (Last 24 hours) at 09/04/17 1147  Last data filed at 09/04/17 0600   Gross per 24 hour   Intake              850 ml   Output               400 ml   Net              450 ml     Physical Exam   Constitutional: He is oriented to person, place, and time. He appears well-developed and well-nourished.   HENT:   Head: Normocephalic and atraumatic.   Eyes: EOM are normal. Pupils are equal, round, and reactive to light.   Neck: Normal range of motion. Neck supple. JVD present.   Cardiovascular: Normal rate and regular rhythm.    No murmur heard.  VAD hum smooth   Pulmonary/Chest: Effort normal and breath sounds normal.   Abdominal: Soft. Bowel sounds are normal. He exhibits no distension. There is no tenderness. There is no guarding.   Musculoskeletal: Normal range of motion. He exhibits no edema.   Neurological: He is alert and oriented to person, place, and time.   Skin: Skin is warm and dry.   Nursing note and vitals reviewed.    Significant Labs:  CBC:    Recent Labs  Lab 09/04/17  0325   WBC 11.00   RBC 2.61*   HGB 7.3*   HCT 24.1*      MCV 92   MCH 28.0   MCHC 30.3*     BNP:    Recent Labs  Lab 09/04/17  0325   BNP 2,259*     CMP:    Recent Labs  Lab 09/04/17  0325   GLU 75   CALCIUM 8.1*   ALBUMIN 2.2*   PROT 6.6      K 4.4   CO2 25   *   BUN 29*   CREATININE 1.2   ALKPHOS 115   ALT 15   AST 20   BILITOT 1.3*      Coagulation:     Recent Labs  Lab 09/04/17  0325   INR 3.4*     LDH:    Recent Labs  Lab 09/02/17  0415 09/03/17  0330 09/04/17  0325   * 260 275*     Microbiology:  Microbiology Results (last 7 days)     Procedure Component Value Units Date/Time    Blood culture [[id number]] Collected:  08/29/17 0836    Order Status:  Completed Specimen:  Blood from Line, Jugular, Internal Right Updated:  09/03/17 1022     Blood Culture, Routine No growth after 5 days.    Narrative:       FromTrialysis,prior to removing line.          Estimated Creatinine Clearance: 57.8 mL/min (based on SCr of 1.2 mg/dL).        Assessment and Plan:     * LVAD (left ventricular assist device) present    -HeartMate 3 Implanted 7/27/2017 as DT    -s/p delayed chest closure (7/28/17) and extubated (7/29/17), reintubated 8/9/17 and extubated 8/13/17  -CTS Primary  -Implanted by Dr. Downing  -Continue Coumadin, Goal INR 2.0-3.0. Supratherapeutic today again at 3.4. Would give at least 1 mg tonight.   -Antiplatelets  mg  -LDH is stable overall today. Will continue to monitor daily.  -Speed set at 5200 rpm  -Interrogation notable for no events  -Not listed for OHTx           Stage III pressure ulcer of sacral region    - wound care        Bacteremia    - ESBL bacteremia.Cont ertapenem. Per ID- continue ertapenem x 4-6 weeks from 8/11. If discharged on ertapenem needs weekly cbc and cmp while on antibiotics (please fax results to ID clinic)  - blood cultures from 8/29 NGTD          Atrial fibrillation    -AC, Amio per C TS          Hyperglycemia    -per primary team        Atrial tachycardia    - [id number] - 3  -Rec restarting Amio. AC per CTS        AICD discharge    - Appropriate in the setting of VT aggravated by underlying AT/AFL (earlier during the admission). Device reprogrammed to VVI 80 this admission          Hepatitis B core antibody positive since 2012              V-tach    - Recommend resuming Amio, NSVT on tele        Hyperlipidemia    -continue pravastatin        Hypertension    -Patient is pulsatile  -MAP goal 60-80 mmHg  -Blood pressure uncontrolled over the last 24 hours  -Antihypertensive medications include Amlodipine  -Would adjust current regimen & add additional agent.             CHF (NYHA class IV, ACC/AHA stage D)    -NIC  -Last 2D Echo 9/1/2017: LVEF 10-15%, LVEDD 6.3 cm  -Hypervolemic on examination today  -Current diuretic regimen: Furosemide IVP. Net even over the last 24 hours. Will I/Os not accurate. Diuretic regimen per primary.  -GDMT with n/a  -2g Na dietary restriction, 1500 mL fluid restriction, strict I/Os              Abigail Green PA-C  Heart Transplant  [facility]

## 2017-09-04 NOTE — ASSESSMENT & PLAN NOTE
-NICM  -Last 2D Echo 9/1/2017: LVEF 10-15%, LVEDD 6.3 cm  -Hypervolemic on examination today  -Current diuretic regimen: Furosemide IVP. Net even over the last 24 hours. Will I/Os not accurate. Diuretic regimen per primary.  -GDMT with n/a  -2g Na dietary restriction, 1500 mL fluid restriction, strict I/Os

## 2017-09-04 NOTE — PLAN OF CARE
Problem: Physical Therapy Goal  Goal: Physical Therapy Goal  Goals to be met by: 17     Patient will increase functional independence with mobility by performin. Supine to sit with MInimal Assistance-  Met 2017  2. Sit to supine with MInimal Assistance - not met  3. Sit to stand transfer with Minimal Assistance- not met  4. Bed to chair transfer with Minimal Assistance- not met  5. Gait  x 50 feet with Minimal Assistance. -  Met 2017  6. Lower extremity exercise program x15 reps, with supervision, in order to increase LE strength and (I) with functional mobility. - not met  7.Pt mod I supine to sit- not met  8. Pt receive gait training ~ 220 ft with AD if needed and supervision- not met         Outcome: Ongoing (interventions implemented as appropriate)  PT goals remain appropriate    Wilian Zarate, PT  2017

## 2017-09-04 NOTE — PLAN OF CARE
Problem: Patient Care Overview  Goal: Plan of Care Review  Outcome: Ongoing (interventions implemented as appropriate)  Pt had no significant events overnight. Pt encouraged to turn and reposition, wife helps with repositioning as well. Pt encourage to call whenever needed to ambulate. Pt is being cleaned and barrier cream applied to buttocks to help with stage 3 pressure ulcers. Pt has wife at beside to help with coping. LVAD sounds smooth dopplers remain within normal limits. PT receiving TPN and boost to help with nutrition. PT is also on dobutamine and has a pacemake/defib.

## 2017-09-04 NOTE — ASSESSMENT & PLAN NOTE
-HeartMate 3 Implanted 7/27/2017 as DT   -s/p delayed chest closure (7/28/17) and extubated (7/29/17), reintubated 8/9/17 and extubated 8/13/17  -CTS Primary  -Implanted by Dr. Downing  -Continue Coumadin, Goal INR 2.0-3.0. Supratherapeutic today again at 3.4. Would give at least 1 mg tonight.   -Antiplatelets  mg  -LDH is stable overall today. Will continue to monitor daily.  -Speed set at 5200 rpm  -Interrogation notable for no events  -Not listed for OHTx

## 2017-09-04 NOTE — PROGRESS NOTES
Daily E and M and VAD Interrogation Note    Reason for Visit: HM III implant   Patient is seen in follow up for management            [x] Other - HM III     Interval History:  [] Interval history unobtainable due to intubation.  The [x] implant/[] explant date was 7/27/17     Events -  NAEON.  Eating more solid food and Boost. Hyponatremia improved.      Review of Systems:   Negative except as above.     Medications:  Current Facility-Administered Medications   Medication Dose Route Frequency Provider Last Rate Last Dose    albumin human 5% bottle 500 mL  500 mL Intravenous PRN Shayne Espinoza MD   500 mL at 08/09/17 0700    albuterol nebulizer solution 2.5 mg  2.5 mg Nebulization Q4H PRN Shayne Espinoza MD        amlodipine tablet 10 mg  10 mg Oral Daily Geovanna Marques NP   10 mg at 09/04/17 0914    bisacodyl suppository 10 mg  10 mg Rectal Daily PRN Shayne Espinoza MD   10 mg at 09/01/17 0912    dextrose 5 % infusion   Intravenous Continuous Sarah Rao MD 15 mL/hr at 09/03/17 1453      dextrose 50% injection 12.5 g  12.5 g Intravenous PRN Charbel Ritter MD   12.5 g at 08/30/17 0449    DOBUtamine 1000 mg in D5W 250 mL infusion (premix non-titrating)  5 mcg/kg/min (Dosing Weight) Intravenous Continuous Sunny Downing MD 6 mL/hr at 09/03/17 0403 5 mcg/kg/min at 09/03/17 0403    ertapenem (INVANZ) 1 g in sodium chloride 0.9 % 100 mL IVPB (ready to mix system)  1 g Intravenous Q24H Susannah Elizabeth PA-C 200 mL/hr at 09/03/17 2148 1 g at 09/03/17 2148    furosemide injection 20 mg  20 mg Intravenous BID Sarah Rao MD   20 mg at 09/04/17 0913    glucagon (human recombinant) injection 1 mg  1 mg Intramuscular PRN Charbel Ritter MD        hydrALAZINE injection 10 mg  10 mg Intravenous Q6H PRN Shlomo Serrato MD   10 mg at 09/04/17 0456    hydrocodone-acetaminophen 5-325mg per tablet 1 tablet  1 tablet Oral Q6H PRN Nadia Lucia, NP   1 tablet at 09/03/17  1520    insulin aspart pen 0-5 Units  0-5 Units Subcutaneous Q4H PRN Charbel Ritter MD   2 Units at 08/10/17 0751    lidocaine 5 % patch 1 patch  1 patch Transdermal Q24H Geovanna Marques NP   1 patch at 09/03/17 1155    magnesium oxide tablet 400 mg  400 mg Oral TID Geovanna Marques NP   400 mg at 09/04/17 0531    ondansetron injection 4 mg  4 mg Intravenous Q6H PRN Shayne Espinoza MD   4 mg at 09/03/17 1153    pantoprazole injection 40 mg  40 mg Intravenous BID Nadia Lucia NP   40 mg at 09/04/17 0914    potassium chloride SA CR tablet 40 mEq  40 mEq Oral BID Geovanna Marques NP   40 mEq at 09/04/17 0914    pravastatin tablet 20 mg  20 mg Oral QHS Geovanna Marques NP   20 mg at 09/03/17 2148    sodium chloride 0.9% flush 10 mL  10 mL Intravenous Q6H Sunny Downing MD   10 mL at 09/04/17 0531    And    sodium chloride 0.9% flush 10 mL  10 mL Intravenous PRN Sunny Downing MD        TPN ADULT CENTRAL LINE CUSTOM   Intravenous Continuous Sarah Rao MD 30 mL/hr at 09/03/17 2307         Physical Examination:  Vital Signs:   Vitals:    09/04/17 0901   BP: (!) 72/0   Pulse: (!) 58   Resp: 18   Temp: 97.9 °F (36.6 °C)     Cardiovascular:  [x] Regular rate and rhythm  []  Other  [x]  No edema []  Edema present  [x]  Clear to auscultation  VAD sounds smooth  Skin:  Incision is [x]  Clean, dry and intact.    Sternum:  [x]  Stable []  Unstable  Driveline(s):   [x]  Clean, dry and intact.     Labs:  CBC, BMP, LDH, INR reviewed    Procedure:  Device Interrogation including analysis of device parameters.  Current Settings   [x]  Ventricular Assist Device     Review of device function is [x]  Stable     TXP LVAD INTERROGATIONS 9/4/2017 9/3/2017 9/3/2017 9/3/2017 9/3/2017 9/3/2017 9/3/2017   Type HeartMate II HeartMate3 HeartMate3 HeartMate3 HeartMate3 (No Data) HeartMate3   Flow 4.0 4.2 4.2 4.4 4.1 - 4.2   Speed 5200 5200 5200 5200 5200 - 5200   PI 3.9 3.6 3.8 3.7 3.7 - 3.5   Power ([place]) 3.6  3.5 3.5 3.6 3.6 - 3.6   [facility] - 4800 - - - - 4800   Pulsatility - - - Pulse Pulse - Intermittent pulse   Some recent data might be hidden       Assessment:  [x]  Primary Cardiomyopathy [x]  Congestive Heart Failure   []  Atrial Fibrillation []  Ventricular Tachycardia   []  Aftercare cardiac device [x]  Long term (current) use of anticoagulants   []  Ventilator-associated pneumonia []  Pneumonia viral, unspecified   []  Pneumonia, bacterial, unspecified []  Pneumonia, organism unspecified   []  Hemorrhage of GI tract, unspecified    []  Nosebleed []  Driveline infection    [x]  Decubitus/sacral         Plan:  [x]  Interval history obtained from ICU attending team member during rounding today  [x]  VAD/MATT teaching performed with patient  [x]  Mobilization / Physical Therapy ongoing  [x]  Anticoagulation []  Ongoing [x]  Held    Cont Norvasc   Hypernatremia: improved- decrease fluid rate  Replace phos  Nutrition following   TPN re ordered   Calorie counts  Wound care consulted  Specialty overlay bed  Continue inotropic support  Lasix 20mg BID        Date of Service: 09/04/2017 \    Andres Uriarte M.D.  General Surgery PGY2  [phone]

## 2017-09-04 NOTE — PROGRESS NOTES
Daily E and M and VAD Interrogation Note    Reason for Visit: HM III implant   Patient is seen in follow up for management            [x] Other - HM III     Interval History:  [] Interval history unobtainable due to intubation.  The [x] implant/[] explant date was 7/27/17     Events -  NAEON.  Eating more solid food and Boost. Hyponatremia improved.      Review of Systems:   Negative except as above.     Medications:  Current Facility-Administered Medications   Medication Dose Route Frequency Provider Last Rate Last Dose    albumin human 5% bottle 500 mL  500 mL Intravenous PRN Shayne Espinoza MD   500 mL at 08/09/17 0700    albuterol nebulizer solution 2.5 mg  2.5 mg Nebulization Q4H PRN Shayne Espinoza MD        amlodipine tablet 10 mg  10 mg Oral Daily Geovanna Marques NP   10 mg at 09/03/17 0819    bisacodyl suppository 10 mg  10 mg Rectal Daily PRN Shayne Espinoza MD   10 mg at 09/01/17 0912    dextrose 5 % infusion   Intravenous Continuous Sarah Rao MD 15 mL/hr at 09/03/17 1453      dextrose 50% injection 12.5 g  12.5 g Intravenous PRN Charbel Ritter MD   12.5 g at 08/30/17 0449    DOBUtamine 1000 mg in D5W 250 mL infusion (premix non-titrating)  5 mcg/kg/min (Dosing Weight) Intravenous Continuous Sunny Downing MD 6 mL/hr at 09/03/17 0403 5 mcg/kg/min at 09/03/17 0403    ertapenem (INVANZ) 1 g in sodium chloride 0.9 % 100 mL IVPB (ready to mix system)  1 g Intravenous Q24H Susannah Elizabeth PA-C 200 mL/hr at 09/02/17 2131 1 g at 09/02/17 2131    furosemide injection 20 mg  20 mg Intravenous BID Sarah Rao MD   20 mg at 09/03/17 1757    glucagon (human recombinant) injection 1 mg  1 mg Intramuscular PRN Charbel Ritter MD        hydrALAZINE injection 10 mg  10 mg Intravenous Q6H PRN Shlomo Serrato MD   10 mg at 09/03/17 1610    hydrocodone-acetaminophen 5-325mg per tablet 1 tablet  1 tablet Oral Q6H PRN Nadia Lucia, NP   1 tablet at 09/03/17  1520    insulin aspart pen 0-5 Units  0-5 Units Subcutaneous Q4H PRN Charbel Ritter MD   2 Units at 08/10/17 0751    lidocaine 5 % patch 1 patch  1 patch Transdermal Q24H Geovanna Marques NP   1 patch at 09/03/17 1155    magnesium oxide tablet 400 mg  400 mg Oral TID Geovanna Marques NP   400 mg at 09/03/17 1452    ondansetron injection 4 mg  4 mg Intravenous Q6H PRN Shayne Espinoza MD   4 mg at 09/03/17 1153    pantoprazole injection 40 mg  40 mg Intravenous BID Nadia Lucia NP   40 mg at 09/03/17 0818    potassium chloride SA CR tablet 40 mEq  40 mEq Oral BID Geovanna Marques NP   40 mEq at 09/03/17 0819    pravastatin tablet 20 mg  20 mg Oral QHS Geovanna Marques NP   20 mg at 09/02/17 2048    sodium chloride 0.9% flush 10 mL  10 mL Intravenous Q6H Sunny Downing MD   10 mL at 09/03/17 1757    And    sodium chloride 0.9% flush 10 mL  10 mL Intravenous PRN Sunny Downing MD        TPN ADULT CENTRAL LINE CUSTOM   Intravenous Continuous Geovanna Marques NP 30 mL/hr at 09/02/17 2144      TPN ADULT CENTRAL LINE CUSTOM   Intravenous Continuous Sarah Rao MD           Physical Examination:  Vital Signs:   Vitals:    09/03/17 2000   BP: (!) 84/0   Pulse: 81   Resp: 18   Temp: 98.8 °F (37.1 °C)     Cardiovascular:  [x] Regular rate and rhythm  []  Other  [x]  No edema []  Edema present  [x]  Clear to auscultation  VAD sounds smooth  Skin:  Incision is [x]  Clean, dry and intact.    Sternum:  [x]  Stable []  Unstable  Driveline(s):   [x]  Clean, dry and intact.     Labs:  CBC, BMP, LDH, INR reviewed    Procedure:  Device Interrogation including analysis of device parameters.  Current Settings   [x]  Ventricular Assist Device     Review of device function is [x]  Stable     TXP LVAD INTERROGATIONS 9/3/2017 9/3/2017 9/3/2017 9/3/2017 9/3/2017 9/3/2017 9/3/2017   Type HeartMate3 HeartMate3 HeartMate3 (No Data) HeartMate3 HeartMate3 HeartMate3   Flow 4.2 4.4 4.1 - 4.2 4.4 4.3   Speed 5200  5200 5200 - 5200 5200 5200   PI 3.8 3.7 3.7 - 3.5 3.5 3.6   Power ([place]) 3.5 3.6 3.6 - 3.6 3.7 3.6   LSL - - - - 4800 - -   Pulsatility - Pulse Pulse - Intermittent pulse - -   Some recent data might be hidden       Assessment:  [x]  Primary Cardiomyopathy [x]  Congestive Heart Failure   []  Atrial Fibrillation []  Ventricular Tachycardia   []  Aftercare cardiac device [x]  Long term (current) use of anticoagulants   []  Ventilator-associated pneumonia []  Pneumonia viral, unspecified   []  Pneumonia, bacterial, unspecified []  Pneumonia, organism unspecified   []  Hemorrhage of GI tract, unspecified    []  Nosebleed []  Driveline infection    [x]  Decubitus/sacral         Plan:  [x]  Interval history obtained from ICU attending team member during rounding today  [x]  VAD/MATT teaching performed with patient  [x]  Mobilization / Physical Therapy ongoing  [x]  Anticoagulation []  Ongoing [x]  Held    Increased Norvasc   Hypernatremia: improved- decrease fluid rate  Replace phos  Nutrition following   TPN re ordered   Calorie counts  Wound care consulted  Specialty overlay bed  Continue inotropic support  Lasix 20mg BID  0.5mg Coumadin        Date of Service: 09/03/2017 \    Sarah Rao MD, PGY-2  General Surgery  [phone]

## 2017-09-04 NOTE — PROGRESS NOTES
Pt's VAD dressing changed per protocol using soap and water and sterile technique. Pt's wife preformed dressing change. Pt wife kept sterile field sterile; DLES is + 2. No kinks or frays on drive line, secured with delgado anchor. Pt tolerated dressing change well. Next dressing change due 9/3/2017. Pt's wife needs a few more dressing changes to be checked off. Pt's wife given a size 8 to practice donning gloves.  Will continue to monitor.

## 2017-09-05 LAB
ANION GAP SERPL CALC-SCNC: 8 MMOL/L
BASOPHILS # BLD AUTO: 0.05 K/UL
BASOPHILS NFR BLD: 0.6 %
BUN SERPL-MCNC: 26 MG/DL
CALCIUM SERPL-MCNC: 8.2 MG/DL
CHLORIDE SERPL-SCNC: 111 MMOL/L
CO2 SERPL-SCNC: 24 MMOL/L
CREAT SERPL-MCNC: 1.3 MG/DL
DIFFERENTIAL METHOD: ABNORMAL
EOSINOPHIL # BLD AUTO: 0.3 K/UL
EOSINOPHIL NFR BLD: 3 %
ERYTHROCYTE [DISTWIDTH] IN BLOOD BY AUTOMATED COUNT: 17.9 %
EST. GFR  (AFRICAN AMERICAN): >60 ML/MIN/1.73 M^2
EST. GFR  (NON AFRICAN AMERICAN): 56.5 ML/MIN/1.73 M^2
GLUCOSE SERPL-MCNC: 85 MG/DL
HCT VFR BLD AUTO: 23.1 %
HGB BLD-MCNC: 7.1 G/DL
INR PPP: 2.7
LDH SERPL L TO P-CCNC: 283 U/L
LYMPHOCYTES # BLD AUTO: 1.4 K/UL
LYMPHOCYTES NFR BLD: 16.7 %
MAGNESIUM SERPL-MCNC: 1.6 MG/DL
MCH RBC QN AUTO: 28.1 PG
MCHC RBC AUTO-ENTMCNC: 30.7 G/DL
MCV RBC AUTO: 91 FL
MONOCYTES # BLD AUTO: 0.7 K/UL
MONOCYTES NFR BLD: 8.5 %
NEUTROPHILS # BLD AUTO: 6.1 K/UL
NEUTROPHILS NFR BLD: 70.8 %
PHOSPHATE SERPL-MCNC: 2.2 MG/DL
PLATELET # BLD AUTO: 246 K/UL
PMV BLD AUTO: 12 FL
POCT GLUCOSE: 101 MG/DL (ref 70–110)
POTASSIUM SERPL-SCNC: 4.5 MMOL/L
PROTHROMBIN TIME: 26.9 SEC
RBC # BLD AUTO: 2.53 M/UL
SODIUM SERPL-SCNC: 143 MMOL/L
WBC # BLD AUTO: 8.55 K/UL

## 2017-09-05 PROCEDURE — 83615 LACTATE (LD) (LDH) ENZYME: CPT

## 2017-09-05 PROCEDURE — 85610 PROTHROMBIN TIME: CPT

## 2017-09-05 PROCEDURE — 63600175 PHARM REV CODE 636 W HCPCS: Performed by: STUDENT IN AN ORGANIZED HEALTH CARE EDUCATION/TRAINING PROGRAM

## 2017-09-05 PROCEDURE — 25000003 PHARM REV CODE 250: Performed by: NURSE PRACTITIONER

## 2017-09-05 PROCEDURE — 27000248 HC VAD-ADDITIONAL DAY

## 2017-09-05 PROCEDURE — 63600175 PHARM REV CODE 636 W HCPCS: Performed by: GENERAL PRACTICE

## 2017-09-05 PROCEDURE — 85025 COMPLETE CBC W/AUTO DIFF WBC: CPT

## 2017-09-05 PROCEDURE — 97530 THERAPEUTIC ACTIVITIES: CPT

## 2017-09-05 PROCEDURE — 63600175 PHARM REV CODE 636 W HCPCS: Performed by: THORACIC SURGERY (CARDIOTHORACIC VASCULAR SURGERY)

## 2017-09-05 PROCEDURE — A4216 STERILE WATER/SALINE, 10 ML: HCPCS | Performed by: THORACIC SURGERY (CARDIOTHORACIC VASCULAR SURGERY)

## 2017-09-05 PROCEDURE — 20600001 HC STEP DOWN PRIVATE ROOM

## 2017-09-05 PROCEDURE — 80048 BASIC METABOLIC PNL TOTAL CA: CPT

## 2017-09-05 PROCEDURE — 93750 INTERROGATION VAD IN PERSON: CPT | Performed by: STUDENT IN AN ORGANIZED HEALTH CARE EDUCATION/TRAINING PROGRAM

## 2017-09-05 PROCEDURE — 97116 GAIT TRAINING THERAPY: CPT

## 2017-09-05 PROCEDURE — 25000003 PHARM REV CODE 250: Performed by: THORACIC SURGERY (CARDIOTHORACIC VASCULAR SURGERY)

## 2017-09-05 PROCEDURE — 84100 ASSAY OF PHOSPHORUS: CPT

## 2017-09-05 PROCEDURE — 99232 SBSQ HOSP IP/OBS MODERATE 35: CPT | Mod: ,,, | Performed by: INTERNAL MEDICINE

## 2017-09-05 PROCEDURE — 83735 ASSAY OF MAGNESIUM: CPT

## 2017-09-05 PROCEDURE — 93750 INTERROGATION VAD IN PERSON: CPT | Mod: ,,, | Performed by: INTERNAL MEDICINE

## 2017-09-05 PROCEDURE — 63600175 PHARM REV CODE 636 W HCPCS: Performed by: PHYSICIAN ASSISTANT

## 2017-09-05 RX ORDER — PANTOPRAZOLE SODIUM 40 MG/1
40 TABLET, DELAYED RELEASE ORAL DAILY
Status: DISCONTINUED | OUTPATIENT
Start: 2017-09-05 | End: 2017-09-22 | Stop reason: HOSPADM

## 2017-09-05 RX ORDER — WARFARIN 1 MG/1
1 TABLET ORAL ONCE
Status: DISCONTINUED | OUTPATIENT
Start: 2017-09-05 | End: 2017-09-05

## 2017-09-05 RX ORDER — HYDRALAZINE HYDROCHLORIDE 25 MG/1
25 TABLET, FILM COATED ORAL EVERY 8 HOURS
Status: DISCONTINUED | OUTPATIENT
Start: 2017-09-05 | End: 2017-09-07

## 2017-09-05 RX ORDER — POTASSIUM CHLORIDE 20 MEQ/1
40 TABLET, EXTENDED RELEASE ORAL 2 TIMES DAILY
Status: DISCONTINUED | OUTPATIENT
Start: 2017-09-05 | End: 2017-09-09

## 2017-09-05 RX ORDER — WARFARIN 2 MG/1
2 TABLET ORAL ONCE
Status: COMPLETED | OUTPATIENT
Start: 2017-09-05 | End: 2017-09-05

## 2017-09-05 RX ADMIN — DOBUTAMINE IN DEXTROSE 5 MCG/KG/MIN: 400 INJECTION, SOLUTION INTRAVENOUS at 06:09

## 2017-09-05 RX ADMIN — HYDRALAZINE HYDROCHLORIDE 25 MG: 25 TABLET, FILM COATED ORAL at 02:09

## 2017-09-05 RX ADMIN — FUROSEMIDE 20 MG: 10 INJECTION, SOLUTION INTRAMUSCULAR; INTRAVENOUS at 05:09

## 2017-09-05 RX ADMIN — DIBASIC SODIUM PHOSPHATE, MONOBASIC POTASSIUM PHOSPHATE AND MONOBASIC SODIUM PHOSPHATE 2 TABLET: 852; 155; 130 TABLET ORAL at 09:09

## 2017-09-05 RX ADMIN — Medication 10 ML: at 12:09

## 2017-09-05 RX ADMIN — PANTOPRAZOLE SODIUM 40 MG: 40 TABLET, DELAYED RELEASE ORAL at 09:09

## 2017-09-05 RX ADMIN — MAGNESIUM OXIDE TAB 400 MG (241.3 MG ELEMENTAL MG) 400 MG: 400 (241.3 MG) TAB at 05:09

## 2017-09-05 RX ADMIN — PRAVASTATIN SODIUM 20 MG: 20 TABLET ORAL at 10:09

## 2017-09-05 RX ADMIN — MAGNESIUM OXIDE TAB 400 MG (241.3 MG ELEMENTAL MG) 400 MG: 400 (241.3 MG) TAB at 10:09

## 2017-09-05 RX ADMIN — WARFARIN SODIUM 2 MG: 2 TABLET ORAL at 05:09

## 2017-09-05 RX ADMIN — HYDRALAZINE HYDROCHLORIDE 25 MG: 25 TABLET, FILM COATED ORAL at 09:09

## 2017-09-05 RX ADMIN — HYDRALAZINE HYDROCHLORIDE 10 MG: 20 INJECTION INTRAMUSCULAR; INTRAVENOUS at 06:09

## 2017-09-05 RX ADMIN — POTASSIUM CHLORIDE 40 MEQ: 1500 TABLET, EXTENDED RELEASE ORAL at 10:09

## 2017-09-05 RX ADMIN — ERTAPENEM SODIUM 1 G: 1 INJECTION, POWDER, LYOPHILIZED, FOR SOLUTION INTRAMUSCULAR; INTRAVENOUS at 10:09

## 2017-09-05 RX ADMIN — Medication 10 ML: at 05:09

## 2017-09-05 RX ADMIN — LIDOCAINE 1 PATCH: 50 PATCH TOPICAL at 12:09

## 2017-09-05 RX ADMIN — MAGNESIUM OXIDE TAB 400 MG (241.3 MG ELEMENTAL MG) 400 MG: 400 (241.3 MG) TAB at 02:09

## 2017-09-05 RX ADMIN — HYDRALAZINE HYDROCHLORIDE 25 MG: 25 TABLET, FILM COATED ORAL at 10:09

## 2017-09-05 RX ADMIN — AMLODIPINE BESYLATE 10 MG: 10 TABLET ORAL at 09:09

## 2017-09-05 RX ADMIN — FUROSEMIDE 20 MG: 10 INJECTION, SOLUTION INTRAMUSCULAR; INTRAVENOUS at 09:09

## 2017-09-05 NOTE — PROGRESS NOTES
Spoke with Dr. Ramos regarding pt receiving coumadin tonight. MD stated he would speak with attending and would place the order if needed. Will continue to monitor.

## 2017-09-05 NOTE — ASSESSMENT & PLAN NOTE
-HeartMate 3 Implanted 7/27/2017 as DT   -s/p delayed chest closure (7/28/17) and extubated (7/29/17), reintubated 8/9/17 and extubated 8/13/17  -CTS Primary  -Implanted by Dr. Downing  -Continue Coumadin, Goal INR 2.0-3.0. Therapeutic today 2.7. Dosing per CTS  -Antiplatelets : n/a  -LDH is stable overall today. Will continue to monitor daily.  -Speed set at 5200 rpm  -Interrogation notable for no events  -Not listed for OHTx

## 2017-09-05 NOTE — PROGRESS NOTES
[facility]  Heart Transplant  Progress Note    Patient Name: Suman Hayden  MRN: [id number]  Admission Date: 7/18/2017  Hospital Length of Stay: 49 days  Attending Physician: Sunny Downing MD  Primary Care Provider: Joe Ernst MD  Principal Problem:LVAD (left ventricular assist device) present    Subjective:     Interval History: Had good BM yesterday.  Report he is eating better.  No complaints this morning. CTS stopping tube feeds; and adjusting BP medications     Continuous Infusions:   DOBUTamine 5 mcg/kg/min (09/05/17 0609)     Scheduled Meds:   amlodipine  10 mg Oral Daily    ertapenem (INVANZ) IVPB  1 g Intravenous Q24H    furosemide  20 mg Intravenous BID    hydrALAZINE  25 mg Oral Q8H    lidocaine  1 patch Transdermal Q24H    magnesium oxide  400 mg Oral TID    pantoprazole  40 mg Oral Daily    potassium chloride SA  40 mEq Oral BID    pravastatin  20 mg Oral QHS    sodium chloride 0.9%  10 mL Intravenous Q6H    warfarin  2 mg Oral Once     PRN Meds:albumin human 5%, albuterol sulfate, bisacodyl, dextrose 50%, glucagon (human recombinant), hydrALAZINE, hydrocodone-acetaminophen 5-325mg, insulin aspart, ondansetron, Flushing PICC Protocol **AND** sodium chloride 0.9% **AND** sodium chloride 0.9%    Review of patient's allergies indicates:  No Known Allergies  Objective:     Vital Signs (Most Recent):  Temp: 98 °F (36.7 °C) (09/05/17 1200)  Pulse: 80 (09/05/17 1200)  Resp: 16 (09/05/17 1200)  BP: (!) 78/0 (09/05/17 1207)  SpO2: 99 % (09/05/17 1200) Vital Signs (24h Range):  Temp:  [97.4 °F (36.3 °C)-98.7 °F (37.1 °C)] 98 °F (36.7 °C)  Pulse:  [78-91] 80  Resp:  [16-18] 16  SpO2:  [94 %-99 %] 99 %  BP: ()/(0-77) 78/0     Weight: 81.4 kg (179 lb 5.5 oz)  Body mass index is 27.27 kg/m².      Intake/Output Summary (Last 24 hours) at 09/05/17 1243  Last data filed at 09/05/17 0500   Gross per 24 hour   Intake              660 ml   Output              875 ml   Net              -215 ml     Physical Exam   Constitutional: sitting in chair NAD  Neck: Normal range of motion. Neck supple. JVD present.   Cardiovascular: VAD hum smooth   Pulmonary/Chest: Effort normal and breath sounds normal.   Abdominal: Soft. Bowel sounds are normal. He exhibits no distension. There is no tenderness. There is no guarding.   Musculoskeletal: Normal range of motion. He exhibits no edema.   Neurological: He is alert and oriented to person, place, and time.   Skin: Skin is warm and dry.     Significant Labs:  CBC:    Recent Labs  Lab 09/05/17 0337   WBC 8.55   RBC 2.53*   HGB 7.1*   HCT 23.1*      MCV 91   MCH 28.1   MCHC 30.7*     BNP:    Recent Labs  Lab 09/04/17 0325   BNP 2,259*     CMP:    Recent Labs  Lab 09/04/17 0325 09/05/17 0337   GLU 75 85   CALCIUM 8.1* 8.2*   ALBUMIN 2.2*  --    PROT 6.6  --     143   K 4.4 4.5   CO2 25 24   * 111*   BUN 29* 26*   CREATININE 1.2 1.3   ALKPHOS 115  --    ALT 15  --    AST 20  --    BILITOT 1.3*  --       Coagulation:     Recent Labs  Lab 09/05/17 0337   INR 2.7*     LDH:    Recent Labs  Lab 09/03/17  0330 09/04/17 0325 09/05/17 0337    275* 283*     Microbiology:  Microbiology Results (last 7 days)     Procedure Component Value Units Date/Time    Blood culture [[id number]] Collected:  08/29/17 0836    Order Status:  Completed Specimen:  Blood from Line, Jugular, Internal Right Updated:  09/03/17 1022     Blood Culture, Routine No growth after 5 days.    Narrative:       FromTrialysis,prior to removing line.          I have reviewed all pertinent labs within the past 24 hours.    Estimated Creatinine Clearance: 53.3 mL/min (based on SCr of 1.3 mg/dL).      Assessment and Plan:     * LVAD (left ventricular assist device) present    -HeartMate 3 Implanted 7/27/2017 as DT   -s/p delayed chest closure (7/28/17) and extubated (7/29/17), reintubated 8/9/17 and extubated 8/13/17  -CTS Primary  -Implanted by Dr. Downing  -Continue Coumadin, Goal  INR 2.0-3.0. Therapeutic today 2.7. Dosing per CTS  -Antiplatelets : n/a  -LDH is stable overall today. Will continue to monitor daily.  -Speed set at 5200 rpm  -Interrogation notable for no events  -Not listed for OHTx           Bacteremia    - ESBL bacteremia.Cont ertapenem. Per ID- continue ertapenem x 4-6 weeks from 8/11. If discharged on ertapenem needs weekly cbc and cmp while on antibiotics (please fax results to ID clinic)  - blood cultures from 8/29 NGTD          Essential hypertension    -Patient is pulsatile  -MAP goal 60-80 mmHg  -Blood pressure uncontrolled over the last 24 hours  -Antihypertensive medications include Amlodipine and Hydralazine added this morning.  Will monitor trends              V-tach    - Recommend resuming Amio, NSVT on tele        CHF (NYHA class IV, ACC/AHA stage D)    -NICM  -Last 2D Echo 9/1/2017: LVEF 10-15%, LVEDD 6.3 cm  -Hypervolemic on examination today  -Current diuretic regimen: Furosemide IVP. Net even over the last 24 hours. Will I/Os not accurate. Diuretic regimen per primary.  -GDMT with n/a  -2g Na dietary restriction, 1500 mL fluid restriction, strict I/Os          Hyperlipidemia    -continue pravastatin        Hepatitis B core antibody positive since 2012              Stage III pressure ulcer of sacral region    - wound care        Atrial fibrillation    -AC, Amio per C TS          Hyperglycemia    -per primary team        Atrial tachycardia    - [id number] - 3  -Rec restarting Amio. AC per CTS        AICD discharge    - Appropriate in the setting of VT aggravated by underlying AT/AFL (earlier during the admission). Device reprogrammed to VVI 80 this admission              MELISSA Jon  Heart Transplant spectralink: [id number]  [facility]

## 2017-09-05 NOTE — PT/OT/SLP PROGRESS
Physical Therapy  Treatment    Suman Hayden   MRN: [id number]   Admitting Diagnosis: LVAD (left ventricular assist device) present    PT Received On: 09/05/17  PT Start Time: 1025     PT Stop Time: 1130    PT Total Time (min): 65 min       Billable Minutes:  Gait [id number] and Therapeutic Activity 15    Treatment Type: Treatment  PT/PTA: PT     PTA Visit Number: 0       General Precautions: Standard, fall, LVAD, contact  Orthopedic Precautions: N/A   Braces: N/A    Do you have any cultural, spiritual, Nondenominational conflicts, given your current situation?: none noted     Subjective:  Communicated with RN prior to session.  Pt agreeable to therapy.   Therapy tech Nirmala present throughout session.     Pain/Comfort  Pain Rating 1:  (did not rate)  Location - Side 1: Left  Location 1: chest (x1 brief episode during ambulation)  Pain Addressed 1: Reposition, Distraction    Objective:   Patient found with: telemetry, PICC line (LVAD to battery)    Functional Mobility:  Bed Mobility:   Supine to Sit:  (not performed 2* pt UIC before and after treatment session)    Transfers:  Sit <> Stand Assistance: Moderate Assistance, Maximum Assistance (maxA x1 rep with sternal px; modA x2 reps after sternal px d/c'd)  Sit <> Stand Assistive Device:  ([employer] IV pole)    Gait:   Gait Distance: 450 ft. with intermittent 3 seated rest breaks   Assistance 1: Minimum assistance, With assist of two (minAx1 for balance and weight-shifting; 2nd person providing Pio for managing IV pole; 3rd person present for chair follow)  Gait Assistive Device:  ([employer] IV pole )  Gait Pattern: swing-through gait  Gait Deviation(s): decreased hal, increased time in double stance, decreased velocity of limb motion, decreased step length, decreased weight-shifting ability, excessive knee flexion   Emergency bag present throughout    Chair follow throughout   Pio of 1 for balance, appropriate weight-shifts, and forward progression. Max cues throughout  for encouragement, IV pole management, safety awareness, and upright posture.    Pt with excess knee flexion 2* B knee pain that increased as ambulation distance increased, requiring intermittent seated rest breaks for pain reduction and improved LE function.     Balance:   Static Sit: supervision-SBA  Dynamic Sit: SBA  Static Stand: CGA  Dynamic stand: Pio     Therapeutic Activities and Exercises:  Pt found on battery power with vest donned. Pt and wife educated on how to properly place batteries in vest. MaxA for adjusting waist strap to proper fit.   Reviewed contents of emergency bag. 2 clips place in emergency bag prior to mobility.   Dynamic seated activity involving moving BUE in alternating directions to hit target (balloon volleyball) x5 minutes.   Geovanna Marques NP seen in hallway during ambulation. Asked about d/c'ing sternal precautions as pt 6 weeks post-op. NP assessed pt and verbalized okay to d/c sternal precautions at this time. Pt and pt's wife educated that sternal precautions no longer in place. Both verbalized understanding.   Performed ambulation in hallway as described above. Pt also taken to front [facility] of hospital during ambulation for improved mood and affect.     AM-PAC 6 CLICK MOBILITY  How much help from another person does this patient currently need?   1 = Unable, Total/Dependent Assistance  2 = A lot, Maximum/Moderate Assistance  3 = A little, Minimum/Contact Guard/Supervision  4 = None, Modified [place]/Independent     Turning over in bed (including adjusting bedclothes, sheets and blankets)?: 3  Sitting down on and standing up from a chair with arms (e.g., wheelchair, bedside commode, etc.): 2  Moving from lying on back to sitting on the side of the bed?: 3  Moving to and from a bed to a chair (including a wheelchair)?: 3  Need to walk in hospital room?: 2  Climbing 3-5 steps with a railing?: 1  Total Score: 14    AM-PAC Raw Score CMS G-Code Modifier Level of Impairment  Assistance   6 % Total / Unable   7 - 9 CM 80 - 100% Maximal Assist   10 - 14 CL 60 - 80% Moderate Assist   15 - 19 CK 40 - 60% Moderate Assist   20 - 22 CJ 20 - 40% Minimal Assist   23 CI 1-20% SBA / CGA   24 CH 0% Independent/ Mod I     Patient left up in chair with all lines intact, call button in reach and pt's wife present.    Assessment:  Suman Hayden is a 67 y.o. male with a medical diagnosis of LVAD (left ventricular assist device) present, inserted 7/27/17. Pt progressing functional mobility as he was able to perform gait training with fewer rest breaks needed. Sternal precautions d/c'd by NP during session and pt with improved ability to transfer following, as pt now able to use BUE to assist with transitioning sit<>stand. Further (I) with mobility remains limited due to impaired endurance, decreased balance, and weakness. Pt would continue to benefit from skilled acute PT in order to address current deficits and progress functional mobility.     Rehab identified problem list/impairments: Rehab identified problem list/impairments: weakness, impaired functional mobilty, gait instability, impaired endurance, impaired balance, impaired self care skills, decreased lower extremity function, decreased safety awareness, pain, impaired cardiopulmonary response to activity    Rehab potential is good.    Activity tolerance: Good    Discharge recommendations: Discharge Facility/Level Of Care Needs: rehabilitation facility     Barriers to discharge: Barriers to Discharge: Decreased caregiver support    Equipment recommendations: Equipment Needed After Discharge:  (TBD)     GOALS:    Physical Therapy Goals        Problem: Physical Therapy Goal    Goal Priority Disciplines Outcome Goal Variances Interventions   Physical Therapy Goal     PT/OT, PT Ongoing (interventions implemented as appropriate)     Description:  Goals to be met by: 9/12/17     Patient will increase functional independence with mobility by  performin. Supine to sit with MInimal Assistance-  Met 2017  2. Sit to supine with MInimal Assistance - not met  3. Sit to stand transfer with Minimal Assistance- not met  4. Bed to chair transfer with Minimal Assistance- not met  5. Gait  x 50 feet with Minimal Assistance. -  Met 2017  6. Lower extremity exercise program x15 reps, with supervision, in order to increase LE strength and (I) with functional mobility. - not met  7.Pt mod I supine to sit- not met  8. Pt receive gait training ~ 220 ft with AD if needed and supervision- not met                          PLAN:    Patient to be seen 6 x/week  to address the above listed problems via gait training, therapeutic activities, therapeutic exercises  Plan of Care expires: 17  Plan of Care reviewed with: patient, spouse        Kely Willett, PT, DPT   2017  [phone]

## 2017-09-05 NOTE — PLAN OF CARE
Problem: Patient Care Overview  Goal: Plan of Care Review  Outcome: Ongoing (interventions implemented as appropriate)  Pt had no significant events overnight. Pt encouraged to turn and reposition often, wife helps with repositioning as well. Pt encourage to call whenever needed to ambulate. Pt is being cleaned and barrier cream applied to buttocks to help with stage 3 pressure ulcers. Pt has wife at beside to help with coping and is movatior. LVAD sounds smooth dopplers remain within normal limits. PT receiving TPN and boost to help with nutrition. PT is also on dobutamine and has a pacemake/defib.

## 2017-09-05 NOTE — PLAN OF CARE
Problem: Patient Care Overview  Goal: Plan of Care Review  Patient progressing toward all goals. Plan of care discussed with patient, no questions at this time. Patient ambulating independently, fall precautions in place. VS & LVAD numbers WNL.  infusing; TPN d/c; Will monitor.

## 2017-09-05 NOTE — PT/OT/SLP PROGRESS
Occupational Therapy  Treatment    Suman Hayden   MRN: [id number]   Admitting Diagnosis: LVAD (left ventricular assist device) present    OT Date of Treatment: 09/05/17   OT Start Time: 0938  OT Stop Time: 1006  OT Total Time (min): 28 min    Billable Minutes:  Therapeutic Activity 28 minutes    General Precautions: Standard, LVAD, fall, contact  Orthopedic Precautions: N/A  Braces: N/A    Subjective:  Communicated with RN prior to session. Pt agreeable to OT.    Pain/Comfort  Pain Rating 1: 0/10 (c/o nausea)    Objective:  Patient found with: telemetry, PICC line (LVAD to battery power)     Functional Mobility:  Bed Mobility: NT as pt up in chair and left up in chair.     Transfers:  Sit <> Stand Assistance: Maximum Assistance x 2 trials from bedside chair; cues for technique and to maintain sternal precautions  Sit <> Stand Assistive Device: No Assistive Device    Activities of Daily Living:  UE Dressing Level of Assistance: Minimum assistance to thread waist strap through vest correctly    Balance:   Static Sit: GOOD+: Takes MAXIMAL challenges from all directions.    Dynamic Sit: GOOD+: Maintains balance through MAXIMAL excursions of active trunk motion  Static Stand: POOR: Needs MODERATE assist to maintain    Therapeutic Activities and Exercises:  Pt up in chair upon OT entry, dressed and with LVAD to battery power; required Min A to correctly don waist strap through vest; practiced sit to stand from bedside chair, requiring Max A to stand and Mod A to maintain balance with no AD, difficulty fully extending B knees while standing; pt maintained ~1 min and returned to seated position due to feeling nauseous; required extended rest break and then again required Max A to stand; planned to attempt standing therex or ambulating to sink but pt unable and returned to seated position; performed B UE therex in chair including balloon hits x 50 using B UE and theraputty exercises    AM-PAC 6 CLICK ADL   How much help from  "another person does this patient currently need?   1 = Unable, Total/Dependent Assistance  2 = A lot, Maximum/Moderate Assistance  3 = A little, Minimum/Contact Guard/Supervision  4 = None, Modified [place]/Independent    Putting on and taking off regular lower body clothing? : 2  Bathing (including washing, rinsing, drying)?: 2  Toileting, which includes using toilet, bedpan, or urinal? : 2  Putting on and taking off regular upper body clothing?: 3  Taking care of personal grooming such as brushing teeth?: 3  Eating meals?: 3  Total Score: 15     AM-PAC Raw Score CMS "G-Code Modifier Level of Impairment Assistance   6 % Total / Unable   7 - 8 CM 80 - 100% Maximal Assist   9-13 CL 60 - 80% Moderate Assist   14 - 19 CK 40 - 60% Moderate Assist   20 - 22 CJ 20 - 40% Minimal Assist   23 CI 1-20% SBA / CGA   24 CH 0% Independent/ Mod I       Patient left up in chair with all lines intact, call button in reach and wife present    ASSESSMENT:  Suman Hayden is a 67 y.o. male with a medical diagnosis of LVAD (left ventricular assist device) present and presents with deficits listed below. Pt motivated and making progress toward goals and would continue to benefit from OT to increase independence and safety. Recommend rehab upon D/C as pt currently unsafe to return home at level of function.    Rehab identified problem list/impairments: Rehab identified problem list/impairments: weakness, impaired endurance, impaired self care skills, impaired functional mobilty, impaired balance, decreased safety awareness, impaired fine motor, impaired cardiopulmonary response to activity    Rehab potential is good.    Activity tolerance: Good    Discharge recommendations: Discharge Facility/Level Of Care Needs: rehabilitation facility     Barriers to discharge: Barriers to Discharge: Decreased caregiver support (at current functional level)    Equipment recommendations:  (TBD)     GOALS:    Occupational Therapy Goals        " Problem: Occupational Therapy Goal    Goal Priority Disciplines Outcome Interventions   Occupational Therapy Goal     OT, PT/OT Ongoing (interventions implemented as appropriate)    Description:  Goals to be met by:  2 weeks 9/12/17    Patient will increase functional independence with ADLs by performing:  Feeding: Independent   UE Dressing with Supervision.  LE Dressing with Supervision.  Grooming while standing with Supervision.  Toileting from toilet with Supervision for hygiene and clothing management.   Stand pivot transfers with Supervision.  Toilet transfer to toilet with Supervision.  Pt will be supervision  with LVAD yasmin't.     Goals to be met by:  2 weeks 8/28/17    Patient will increase functional independence with ADLs by performing:  Feeding: Independent   UE Dressing with Supervision.  LE Dressing with Supervision.  Grooming while standing with Supervision.  Toileting from toilet with Supervision for hygiene and clothing management.   Stand pivot transfers with Supervision.  Toilet transfer to toilet with Supervision.  Pt will be supervision  with LVAD yasmin't.                   Multidisciplinary Problems (Resolved)        Problem: Occupational Therapy Goal    Goal Priority Disciplines Outcome Interventions   Occupational Therapy Goal   (Resolved)     OT, PT/OT  Error    Description:  Goals to be met by: 8/04/2017    Patient will increase functional independence with ADLs by performing:    Increased functional strength to 5/5 for improved ADL performance.  Upper extremity exercise program and R LE ankle pumps  3x 10 reps per handout, with independence.                      Plan:  Patient to be seen 6 x/week to address the above listed problems via self-care/home management, therapeutic activities, therapeutic exercises  Plan of Care expires: 08/29/17  Plan of Care reviewed with: patient, spouse    DELMY Mohamud  09/05/2017

## 2017-09-05 NOTE — PLAN OF CARE
Problem: Physical Therapy Goal  Goal: Physical Therapy Goal  Goals to be met by: 17     Patient will increase functional independence with mobility by performin. Supine to sit with MInimal Assistance-  Met 2017  2. Sit to supine with MInimal Assistance - not met  3. Sit to stand transfer with Minimal Assistance- not met  4. Bed to chair transfer with Minimal Assistance- not met  5. Gait  x 50 feet with Minimal Assistance. -  Met 2017  6. Lower extremity exercise program x15 reps, with supervision, in order to increase LE strength and (I) with functional mobility. - not met  7.Pt mod I supine to sit- not met  8. Pt receive gait training ~ 220 ft with AD if needed and supervision- not met         Outcome: Ongoing (interventions implemented as appropriate)  Goals reviewed and remain appropriate. Pt progressing towards goals.    Kely Willett, PT, DPT   2017  [phone]

## 2017-09-05 NOTE — PROGRESS NOTES
Daily E and M and VAD Interrogation Note    Reason for Visit: HM III implant   Patient is seen in follow up for management            [x] Other - HM III     Interval History:  [] Interval history unobtainable due to intubation.  The [x] implant/[] explant date was 7/27/17     Events -  NAEON.  Eating more solid food and Boost. Hyponatremia improved.      Review of Systems:   Negative except as above.     Medications:  Current Facility-Administered Medications   Medication Dose Route Frequency Provider Last Rate Last Dose    albumin human 5% bottle 500 mL  500 mL Intravenous PRN Shayne Espinoza MD   500 mL at 08/09/17 0700    albuterol nebulizer solution 2.5 mg  2.5 mg Nebulization Q4H PRN Shayne Espinoza MD        amlodipine tablet 10 mg  10 mg Oral Daily Geovanna Marques NP   10 mg at 09/05/17 0928    bisacodyl suppository 10 mg  10 mg Rectal Daily PRN Shayne Espinoza MD   10 mg at 09/01/17 0912    dextrose 50% injection 12.5 g  12.5 g Intravenous PRN Charbel Ritter MD   12.5 g at 08/30/17 0449    DOBUtamine 1000 mg in D5W 250 mL infusion (premix non-titrating)  5 mcg/kg/min (Dosing Weight) Intravenous Continuous Sunny Downing MD 6 mL/hr at 09/05/17 0609 5 mcg/kg/min at 09/05/17 0609    ertapenem (INVANZ) 1 g in sodium chloride 0.9 % 100 mL IVPB (ready to mix system)  1 g Intravenous Q24H Susannah Elizabeth PA-C 200 mL/hr at 09/04/17 2146 1 g at 09/04/17 2146    furosemide injection 20 mg  20 mg Intravenous BID Sarah Rao MD   20 mg at 09/05/17 0930    glucagon (human recombinant) injection 1 mg  1 mg Intramuscular PRN Charbel Ritter MD        hydrALAZINE injection 10 mg  10 mg Intravenous Q6H PRN Shlomo Serrato MD   10 mg at 09/05/17 0610    hydrALAZINE tablet 25 mg  25 mg Oral Q8H Geovanna Marques NP   25 mg at 09/05/17 0928    hydrocodone-acetaminophen 5-325mg per tablet 1 tablet  1 tablet Oral Q6H PRN Nadia Lucia, NP   1 tablet at 09/03/17 1520    insulin  aspart pen 0-5 Units  0-5 Units Subcutaneous Q4H PRN Charbel Ritter MD   2 Units at 08/10/17 0751    lidocaine 5 % patch 1 patch  1 patch Transdermal Q24H Geovanna Marques NP   1 patch at 09/03/17 1155    magnesium oxide tablet 400 mg  400 mg Oral TID Geovanna Marques NP   400 mg at 09/05/17 0548    ondansetron injection 4 mg  4 mg Intravenous Q6H PRN Shayne Espinoza MD   4 mg at 09/03/17 1153    pantoprazole EC tablet 40 mg  40 mg Oral Daily Geovanna Marques NP   40 mg at 09/05/17 0928    potassium chloride SA CR tablet 40 mEq  40 mEq Oral BID Geovanna Marques NP        pravastatin tablet 20 mg  20 mg Oral QHS Geovanna Marques NP   20 mg at 09/04/17 2145    sodium chloride 0.9% flush 10 mL  10 mL Intravenous Q6H Sunny Downing MD   10 mL at 09/05/17 0549    And    sodium chloride 0.9% flush 10 mL  10 mL Intravenous PRN Sunny Downing MD        warfarin (COUMADIN) tablet 2 mg  2 mg Oral Once Geovanna Marques NP           Physical Examination:  Vital Signs:   Vitals:    09/05/17 0801   BP: (!) 90/0   Pulse:    Resp:    Temp:      Cardiovascular:  [x] Regular rate and rhythm  []  Other  [x]  No edema []  Edema present  [x]  Clear to auscultation  VAD sounds smooth  Skin:  Incision is [x]  Clean, dry and intact.    Sternum:  [x]  Stable []  Unstable  Driveline(s):   [x]  Clean, dry and intact.     Labs:  CBC, BMP, LDH, INR reviewed    Procedure:  Device Interrogation including analysis of device parameters.  Current Settings   [x]  Ventricular Assist Device     Review of device function is [x]  Stable     TXP LVAD INTERROGATIONS 9/5/2017 9/5/2017 9/4/2017 9/4/2017 9/4/2017 9/4/2017 9/4/2017   Type HeartMate3 HeartMate3 HeartMate3 HeartMate3 HeartMate3 HeartMate3 (No Data)   Flow 4.2 4.0 4.2 4.3 4.2 4.2 -   Speed 5200 5200 5200 5200 5250 5200 -   PI 3.6 3.7 3.6 3.8 3.6 3.8 -   Power ([place]) 3.6 3.5 3.6 3.6 3.6 3.6 -   LSL - 4800 4800 4800 4800 4800 -   Pulsatility - - - - - Pulse -   Some recent data  might be hidden       Assessment:  [x]  Primary Cardiomyopathy [x]  Congestive Heart Failure   []  Atrial Fibrillation []  Ventricular Tachycardia   []  Aftercare cardiac device [x]  Long term (current) use of anticoagulants   []  Ventilator-associated pneumonia []  Pneumonia viral, unspecified   []  Pneumonia, bacterial, unspecified []  Pneumonia, organism unspecified   []  Hemorrhage of GI tract, unspecified    []  Nosebleed []  Driveline infection    [x]  Decubitus/sacral-improvement          Plan:  [x]  Interval history obtained from ICU attending team member during rounding today  [x]  VAD/MATT teaching performed with patient  [x]  Mobilization / Physical Therapy ongoing  [x]  Anticoagulation-Coumadin 2mg tgnight     Cont Norvasc   Added hydralazine   Hypernatremia: improved, d/c D5W  Nutrition following   TPN d/c  Calorie counts  Wound care consulted  Specialty overlay bed  Continue inotropic support  Lasix 20mg BID  Hypophosphatemia: replaced   Discharge planning ongoing

## 2017-09-05 NOTE — PROGRESS NOTES
LVAD dsg change done using sterile technique with soap and water by spouse;  DLES 2;Pink, healthy tissue incorporating into the driveline with little or no erythema, tenderness, or drainage; Suture present; Spouse needed assistance with maintaining sterile gloves (went through 3 pair), however caught herself when sterile field was broken;  Monitoring.

## 2017-09-05 NOTE — PROCEDURES
Patient up in chair aaox3 with wife family at bedside. VAD interrogation completed this AM in the event changes needed to be made. Will continue to monitor for further issues. Pt's wife ms Hayden is almost finished with workbook. We plan on going over some of the alarms that are left to do in workbook.  Pulsatile: Yes   VAD Sounds: HM3  Smooth  Problems / Issues / Alarms with VAD if any: None noted  HCT: 23.1   Modular Cable Connection Intact(no yellow exposed): YES connection loosens and tightens easily.     VAD Interrogation:  TXP LVAD INTERROGATIONS 9/5/2017 9/5/2017 9/4/2017 9/4/2017 9/4/2017 9/4/2017 9/4/2017   Type HeartMate3 HeartMate3 HeartMate3 HeartMate3 HeartMate3 HeartMate3 (No Data)   Flow 4.2 4.0 4.2 4.3 4.2 4.2 -   Speed 5200 5200 5200 5200 5250 5200 -   PI 3.6 3.7 3.6 3.8 3.6 3.8 -   Power ([place]) 3.6 3.5 3.6 3.6 3.6 3.6 -   LSL - 4800 4800 4800 4800 4800 -   Pulsatility - - - - - Pulse -   Some recent data might be hidden   }

## 2017-09-05 NOTE — SUBJECTIVE & OBJECTIVE
Interval History: Had good BM yesterday.  Report he is eating better.  No complaints this morning. CTS stopping tube feeds; and adjusting BP medications     Continuous Infusions:   DOBUTamine 5 mcg/kg/min (09/05/17 0609)     Scheduled Meds:   amlodipine  10 mg Oral Daily    ertapenem (INVANZ) IVPB  1 g Intravenous Q24H    furosemide  20 mg Intravenous BID    hydrALAZINE  25 mg Oral Q8H    lidocaine  1 patch Transdermal Q24H    magnesium oxide  400 mg Oral TID    pantoprazole  40 mg Oral Daily    potassium chloride SA  40 mEq Oral BID    pravastatin  20 mg Oral QHS    sodium chloride 0.9%  10 mL Intravenous Q6H    warfarin  2 mg Oral Once     PRN Meds:albumin human 5%, albuterol sulfate, bisacodyl, dextrose 50%, glucagon (human recombinant), hydrALAZINE, hydrocodone-acetaminophen 5-325mg, insulin aspart, ondansetron, Flushing PICC Protocol **AND** sodium chloride 0.9% **AND** sodium chloride 0.9%    Review of patient's allergies indicates:  No Known Allergies  Objective:     Vital Signs (Most Recent):  Temp: 98 °F (36.7 °C) (09/05/17 1200)  Pulse: 80 (09/05/17 1200)  Resp: 16 (09/05/17 1200)  BP: (!) 78/0 (09/05/17 1207)  SpO2: 99 % (09/05/17 1200) Vital Signs (24h Range):  Temp:  [97.4 °F (36.3 °C)-98.7 °F (37.1 °C)] 98 °F (36.7 °C)  Pulse:  [78-91] 80  Resp:  [16-18] 16  SpO2:  [94 %-99 %] 99 %  BP: ()/(0-77) 78/0     Weight: 81.4 kg (179 lb 5.5 oz)  Body mass index is 27.27 kg/m².      Intake/Output Summary (Last 24 hours) at 09/05/17 1243  Last data filed at 09/05/17 0500   Gross per 24 hour   Intake              660 ml   Output              875 ml   Net             -215 ml     Physical Exam   Constitutional: sitting in chair NAD  Neck: Normal range of motion. Neck supple. JVD present.   Cardiovascular: VAD hum smooth   Pulmonary/Chest: Effort normal and breath sounds normal.   Abdominal: Soft. Bowel sounds are normal. He exhibits no distension. There is no tenderness. There is no guarding.    Musculoskeletal: Normal range of motion. He exhibits no edema.   Neurological: He is alert and oriented to person, place, and time.   Skin: Skin is warm and dry.     Significant Labs:  CBC:    Recent Labs  Lab 09/05/17 0337   WBC 8.55   RBC 2.53*   HGB 7.1*   HCT 23.1*      MCV 91   MCH 28.1   MCHC 30.7*     BNP:    Recent Labs  Lab 09/04/17 0325   BNP 2,259*     CMP:    Recent Labs  Lab 09/04/17 0325 09/05/17 0337   GLU 75 85   CALCIUM 8.1* 8.2*   ALBUMIN 2.2*  --    PROT 6.6  --     143   K 4.4 4.5   CO2 25 24   * 111*   BUN 29* 26*   CREATININE 1.2 1.3   ALKPHOS 115  --    ALT 15  --    AST 20  --    BILITOT 1.3*  --       Coagulation:     Recent Labs  Lab 09/05/17 0337   INR 2.7*     LDH:    Recent Labs  Lab 09/03/17  0330 09/04/17 0325 09/05/17 0337    275* 283*     Microbiology:  Microbiology Results (last 7 days)     Procedure Component Value Units Date/Time    Blood culture [[id number]] Collected:  08/29/17 0836    Order Status:  Completed Specimen:  Blood from Line, Jugular, Internal Right Updated:  09/03/17 1022     Blood Culture, Routine No growth after 5 days.    Narrative:       FromTrialysis,prior to removing line.          I have reviewed all pertinent labs within the past 24 hours.    Estimated Creatinine Clearance: 53.3 mL/min (based on SCr of 1.3 mg/dL).

## 2017-09-05 NOTE — ASSESSMENT & PLAN NOTE
-Patient is pulsatile  -MAP goal 60-80 mmHg  -Blood pressure uncontrolled over the last 24 hours  -Antihypertensive medications include Amlodipine and Hydralazine added this morning.  Will monitor trends

## 2017-09-06 LAB
ALBUMIN SERPL BCP-MCNC: 2.2 G/DL
ALP SERPL-CCNC: 115 U/L
ALT SERPL W/O P-5'-P-CCNC: 16 U/L
ANION GAP SERPL CALC-SCNC: 7 MMOL/L
AST SERPL-CCNC: 23 U/L
BASOPHILS # BLD AUTO: 0.05 K/UL
BASOPHILS NFR BLD: 0.6 %
BILIRUB DIRECT SERPL-MCNC: 0.8 MG/DL
BILIRUB SERPL-MCNC: 1.2 MG/DL
BNP SERPL-MCNC: 1882 PG/ML
BUN SERPL-MCNC: 22 MG/DL
CALCIUM SERPL-MCNC: 8.2 MG/DL
CHLORIDE SERPL-SCNC: 111 MMOL/L
CO2 SERPL-SCNC: 25 MMOL/L
CREAT SERPL-MCNC: 1.2 MG/DL
CRP SERPL-MCNC: 23.4 MG/L
DIFFERENTIAL METHOD: ABNORMAL
EOSINOPHIL # BLD AUTO: 0.2 K/UL
EOSINOPHIL NFR BLD: 2.5 %
ERYTHROCYTE [DISTWIDTH] IN BLOOD BY AUTOMATED COUNT: 18 %
EST. GFR  (AFRICAN AMERICAN): >60 ML/MIN/1.73 M^2
EST. GFR  (NON AFRICAN AMERICAN): >60 ML/MIN/1.73 M^2
GLUCOSE SERPL-MCNC: 53 MG/DL
HCT VFR BLD AUTO: 23.2 %
HGB BLD-MCNC: 7.2 G/DL
INR PPP: 2.6
LDH SERPL L TO P-CCNC: 285 U/L
LYMPHOCYTES # BLD AUTO: 1.3 K/UL
LYMPHOCYTES NFR BLD: 15.5 %
MAGNESIUM SERPL-MCNC: 1.7 MG/DL
MCH RBC QN AUTO: 27.9 PG
MCHC RBC AUTO-ENTMCNC: 31 G/DL
MCV RBC AUTO: 90 FL
MONOCYTES # BLD AUTO: 0.8 K/UL
MONOCYTES NFR BLD: 10.1 %
NEUTROPHILS # BLD AUTO: 5.7 K/UL
NEUTROPHILS NFR BLD: 70.9 %
PHOSPHATE SERPL-MCNC: 3.2 MG/DL
PLATELET # BLD AUTO: 231 K/UL
PMV BLD AUTO: 11.7 FL
POTASSIUM SERPL-SCNC: 4.2 MMOL/L
PREALB SERPL-MCNC: 12 MG/DL
PROT SERPL-MCNC: 6.6 G/DL
PROTHROMBIN TIME: 25.9 SEC
RBC # BLD AUTO: 2.58 M/UL
SODIUM SERPL-SCNC: 143 MMOL/L
WBC # BLD AUTO: 8.04 K/UL

## 2017-09-06 PROCEDURE — 97530 THERAPEUTIC ACTIVITIES: CPT

## 2017-09-06 PROCEDURE — 99232 SBSQ HOSP IP/OBS MODERATE 35: CPT | Mod: ,,, | Performed by: INTERNAL MEDICINE

## 2017-09-06 PROCEDURE — 25000003 PHARM REV CODE 250: Performed by: NURSE PRACTITIONER

## 2017-09-06 PROCEDURE — 25000003 PHARM REV CODE 250: Performed by: THORACIC SURGERY (CARDIOTHORACIC VASCULAR SURGERY)

## 2017-09-06 PROCEDURE — 83880 ASSAY OF NATRIURETIC PEPTIDE: CPT

## 2017-09-06 PROCEDURE — 84100 ASSAY OF PHOSPHORUS: CPT

## 2017-09-06 PROCEDURE — 83615 LACTATE (LD) (LDH) ENZYME: CPT

## 2017-09-06 PROCEDURE — 63600175 PHARM REV CODE 636 W HCPCS: Performed by: STUDENT IN AN ORGANIZED HEALTH CARE EDUCATION/TRAINING PROGRAM

## 2017-09-06 PROCEDURE — 20600001 HC STEP DOWN PRIVATE ROOM

## 2017-09-06 PROCEDURE — 83735 ASSAY OF MAGNESIUM: CPT

## 2017-09-06 PROCEDURE — 86140 C-REACTIVE PROTEIN: CPT

## 2017-09-06 PROCEDURE — 85025 COMPLETE CBC W/AUTO DIFF WBC: CPT

## 2017-09-06 PROCEDURE — 63600175 PHARM REV CODE 636 W HCPCS: Performed by: PHYSICIAN ASSISTANT

## 2017-09-06 PROCEDURE — 85610 PROTHROMBIN TIME: CPT

## 2017-09-06 PROCEDURE — 80076 HEPATIC FUNCTION PANEL: CPT

## 2017-09-06 PROCEDURE — 97116 GAIT TRAINING THERAPY: CPT

## 2017-09-06 PROCEDURE — 84134 ASSAY OF PREALBUMIN: CPT

## 2017-09-06 PROCEDURE — 63600175 PHARM REV CODE 636 W HCPCS: Performed by: THORACIC SURGERY (CARDIOTHORACIC VASCULAR SURGERY)

## 2017-09-06 PROCEDURE — 97535 SELF CARE MNGMENT TRAINING: CPT

## 2017-09-06 PROCEDURE — 80048 BASIC METABOLIC PNL TOTAL CA: CPT

## 2017-09-06 PROCEDURE — 63600175 PHARM REV CODE 636 W HCPCS: Performed by: NURSE PRACTITIONER

## 2017-09-06 PROCEDURE — A4216 STERILE WATER/SALINE, 10 ML: HCPCS | Performed by: THORACIC SURGERY (CARDIOTHORACIC VASCULAR SURGERY)

## 2017-09-06 PROCEDURE — 27000248 HC VAD-ADDITIONAL DAY

## 2017-09-06 RX ORDER — WARFARIN 2.5 MG/1
2.5 TABLET ORAL ONCE
Status: COMPLETED | OUTPATIENT
Start: 2017-09-06 | End: 2017-09-06

## 2017-09-06 RX ORDER — FUROSEMIDE 10 MG/ML
40 INJECTION INTRAMUSCULAR; INTRAVENOUS 2 TIMES DAILY
Status: DISCONTINUED | OUTPATIENT
Start: 2017-09-06 | End: 2017-09-08

## 2017-09-06 RX ADMIN — Medication 10 ML: at 06:09

## 2017-09-06 RX ADMIN — HYDROCODONE BITARTRATE AND ACETAMINOPHEN 1 TABLET: 5; 325 TABLET ORAL at 03:09

## 2017-09-06 RX ADMIN — WARFARIN SODIUM 2.5 MG: 2.5 TABLET ORAL at 05:09

## 2017-09-06 RX ADMIN — LIDOCAINE 1 PATCH: 50 PATCH TOPICAL at 12:09

## 2017-09-06 RX ADMIN — ERTAPENEM SODIUM 1 G: 1 INJECTION, POWDER, LYOPHILIZED, FOR SOLUTION INTRAMUSCULAR; INTRAVENOUS at 08:09

## 2017-09-06 RX ADMIN — FUROSEMIDE 20 MG: 10 INJECTION, SOLUTION INTRAMUSCULAR; INTRAVENOUS at 09:09

## 2017-09-06 RX ADMIN — MAGNESIUM OXIDE TAB 400 MG (241.3 MG ELEMENTAL MG) 400 MG: 400 (241.3 MG) TAB at 03:09

## 2017-09-06 RX ADMIN — HYDRALAZINE HYDROCHLORIDE 25 MG: 25 TABLET, FILM COATED ORAL at 06:09

## 2017-09-06 RX ADMIN — PANTOPRAZOLE SODIUM 40 MG: 40 TABLET, DELAYED RELEASE ORAL at 09:09

## 2017-09-06 RX ADMIN — FUROSEMIDE 40 MG: 10 INJECTION, SOLUTION INTRAVENOUS at 05:09

## 2017-09-06 RX ADMIN — MAGNESIUM OXIDE TAB 400 MG (241.3 MG ELEMENTAL MG) 400 MG: 400 (241.3 MG) TAB at 06:09

## 2017-09-06 RX ADMIN — Medication 10 ML: at 12:09

## 2017-09-06 RX ADMIN — HYDRALAZINE HYDROCHLORIDE 25 MG: 25 TABLET, FILM COATED ORAL at 08:09

## 2017-09-06 RX ADMIN — AMLODIPINE BESYLATE 10 MG: 10 TABLET ORAL at 09:09

## 2017-09-06 RX ADMIN — MAGNESIUM OXIDE TAB 400 MG (241.3 MG ELEMENTAL MG) 400 MG: 400 (241.3 MG) TAB at 08:09

## 2017-09-06 RX ADMIN — Medication 10 ML: at 03:09

## 2017-09-06 RX ADMIN — Medication 10 ML: at 08:09

## 2017-09-06 RX ADMIN — PRAVASTATIN SODIUM 20 MG: 20 TABLET ORAL at 08:09

## 2017-09-06 RX ADMIN — POTASSIUM CHLORIDE 40 MEQ: 1500 TABLET, EXTENDED RELEASE ORAL at 09:09

## 2017-09-06 RX ADMIN — DOBUTAMINE IN DEXTROSE 5 MCG/KG/MIN: 400 INJECTION, SOLUTION INTRAVENOUS at 11:09

## 2017-09-06 RX ADMIN — HYDRALAZINE HYDROCHLORIDE 25 MG: 25 TABLET, FILM COATED ORAL at 03:09

## 2017-09-06 RX ADMIN — POTASSIUM CHLORIDE 40 MEQ: 1500 TABLET, EXTENDED RELEASE ORAL at 08:09

## 2017-09-06 RX ADMIN — ONDANSETRON 4 MG: 2 INJECTION INTRAMUSCULAR; INTRAVENOUS at 03:09

## 2017-09-06 NOTE — PLAN OF CARE
Problem: Patient Care Overview  Goal: Plan of Care Review  Outcome: Ongoing (interventions implemented as appropriate)  Patient progressing toward all goals. Plan of care discussed with patient, no questions at this time. Patient ambulating independently, fall precautions in place. VS & LVAD numbers WNL. Will monitor.

## 2017-09-06 NOTE — PT/OT/SLP PROGRESS
Occupational Therapy  Treatment    Suman Hayden   MRN: [id number]   Admitting Diagnosis: LVAD (left ventricular assist device) present    OT Date of Treatment: 09/06/17   OT Start Time: 1022  OT Stop Time: 1118  OT Total Time (min): 56 min    Billable Minutes:  Self Care/Home Management 33 and Therapeutic Activity 23    General Precautions: Standard, LVAD, fall, contact  Orthopedic Precautions: N/A  Braces: N/A    Subjective:  Communicated with RN prior to session.    Pain/Comfort  Pain Rating 1: 0/10  Location 1: chest (pt c/o mild pain after initial stand)    Objective:  Patient found with: telemetry, PICC line (LVAD to wall power)     Functional Mobility:  Bed Mobility:  Scooting/Bridging: Stand by Assistance to scoot to edge of chair    Transfers:  Sit <> Stand Assistance: Moderate Assistance x 2 trials from bedside chair; max cues for technique as pt no longer has sternal precautions; able to maintain static stand first trial ~2 min with Mod A for balance and difficulty extending B knees fully  Sit <> Stand Assistive Device: No Assistive Device    Activities of Daily Living:  UE Dressing Level of Assistance: Maximum assistance to don LVAD vest and waist strap  LE Dressing Level of Assistance: Stand by assistance to don and tie tennis shoes  Grooming Position: Seated, bedside chair  Grooming Level of Assistance: Stand by assistance to wash face and for denture care  Toileting Where Assessed: Other (Comment) (seated EOB)  Toileting Level of Assistance: Stand by assistance to use urinal    Balance:   Static Sit: GOOD+: Takes MAXIMAL challenges from all directions.    Dynamic Sit: GOOD+: Maintains balance through MAXIMAL excursions of active trunk motion  Static Stand: POOR: Needs MODERATE assist to maintain  Dynamic stand: POOR: N/A    Therapeutic Activities and Exercises:  Pt's wife assisted to chair without staff assist or notifying staff and upon OT entry, pt in mid transfer to chair and wife by herself having  "difficulty assisting pt, IV pole across room and IV being stretched and chair ~2 ft from bed; educated wife on T/F technique and importance of safety and calling for assistance before getting OOB due to pt requiring Mod-Max A at this time; once up in chair, pt switched LVAD to battery power, requiring max cues for sequencing steps correctly and identifying equipment as well as untwisting cables; Max A to don vest correctly with waist strap; practiced T/F from bedside chair x 2 trials, requiring Mod A and cues for technique as pt no longer has sternal precautions; grooming and LB dressing in bedside chair with SBA    AM-PAC 6 CLICK ADL   How much help from another person does this patient currently need?   1 = Unable, Total/Dependent Assistance  2 = A lot, Maximum/Moderate Assistance  3 = A little, Minimum/Contact Guard/Supervision  4 = None, Modified [place]/Independent    Putting on and taking off regular lower body clothing? : 2  Bathing (including washing, rinsing, drying)?: 2  Toileting, which includes using toilet, bedpan, or urinal? : 2  Putting on and taking off regular upper body clothing?: 2  Taking care of personal grooming such as brushing teeth?: 3  Eating meals?: 3  Total Score: 14     AM-PAC Raw Score CMS "G-Code Modifier Level of Impairment Assistance   6 % Total / Unable   7 - 8 CM 80 - 100% Maximal Assist   9-13 CL 60 - 80% Moderate Assist   14 - 19 CK 40 - 60% Moderate Assist   20 - 22 CJ 20 - 40% Minimal Assist   23 CI 1-20% SBA / CGA   24 CH 0% Independent/ Mod I       Patient left up in chair with all lines intact, call button in reach and wife present    ASSESSMENT:  Suman Hayden is a 67 y.o. male with a medical diagnosis of LVAD (left ventricular assist device) present and presents with good effort and participation in therapy. Pt and wife both require cues for safety awareness and pt requiring Mod-Max A for T/Fs as well as Max A for LVAD management. Pt would continue to benefit " from OT to increase independence. Recommend rehab upon D/C.    Rehab identified problem list/impairments: Rehab identified problem list/impairments: weakness, impaired endurance, impaired self care skills, impaired functional mobilty, gait instability, decreased safety awareness, impaired cardiopulmonary response to activity    Rehab potential is good.    Activity tolerance: Good    Discharge recommendations: Discharge Facility/Level Of Care Needs: rehabilitation facility     Barriers to discharge: Barriers to Discharge: Decreased caregiver support (at current functional level)    Equipment recommendations:  (TBD)     GOALS:    Occupational Therapy Goals        Problem: Occupational Therapy Goal    Goal Priority Disciplines Outcome Interventions   Occupational Therapy Goal     OT, PT/OT Ongoing (interventions implemented as appropriate)    Description:  Goals to be met by:  2 weeks 9/12/17    Patient will increase functional independence with ADLs by performing:  Feeding: Independent   UE Dressing with Supervision.  LE Dressing with Supervision.  Grooming while standing with Supervision.  Toileting from toilet with Supervision for hygiene and clothing management.   Stand pivot transfers with Supervision.  Toilet transfer to toilet with Supervision.  Pt will be supervision  with LVAD yasmin't.     Goals to be met by:  2 weeks 8/28/17    Patient will increase functional independence with ADLs by performing:  Feeding: Independent   UE Dressing with Supervision.  LE Dressing with Supervision.  Grooming while standing with Supervision.  Toileting from toilet with Supervision for hygiene and clothing management.   Stand pivot transfers with Supervision.  Toilet transfer to toilet with Supervision.  Pt will be supervision  with LVAD yasmin't.                   Multidisciplinary Problems (Resolved)        Problem: Occupational Therapy Goal    Goal Priority Disciplines Outcome Interventions   Occupational Therapy Goal    (Resolved)     OT, PT/OT  Error    Description:  Goals to be met by: 8/04/2017    Patient will increase functional independence with ADLs by performing:    Increased functional strength to 5/5 for improved ADL performance.  Upper extremity exercise program and R LE ankle pumps  3x 10 reps per handout, with independence.                      Plan:  Patient to be seen 6 x/week to address the above listed problems via self-care/home management, therapeutic activities, therapeutic exercises  Plan of Care expires: 08/29/17  Plan of Care reviewed with: patient, spouse    DELMY Mohamud  09/06/2017

## 2017-09-06 NOTE — SUBJECTIVE & OBJECTIVE
Interval History: patient appetite increasing     Continuous Infusions:   DOBUTamine 5 mcg/kg/min (09/05/17 0609)     Scheduled Meds:   amlodipine  10 mg Oral Daily    ertapenem (INVANZ) IVPB  1 g Intravenous Q24H    furosemide  40 mg Intravenous BID    hydrALAZINE  25 mg Oral Q8H    lidocaine  1 patch Transdermal Q24H    magnesium oxide  400 mg Oral TID    pantoprazole  40 mg Oral Daily    potassium chloride SA  40 mEq Oral BID    pravastatin  20 mg Oral QHS    sodium chloride 0.9%  10 mL Intravenous Q6H    warfarin  2.5 mg Oral Once     PRN Meds:albumin human 5%, albuterol sulfate, bisacodyl, dextrose 50%, glucagon (human recombinant), hydrALAZINE, hydrocodone-acetaminophen 5-325mg, insulin aspart, ondansetron, Flushing PICC Protocol **AND** sodium chloride 0.9% **AND** sodium chloride 0.9%    Review of patient's allergies indicates:  No Known Allergies  Objective:     Vital Signs (Most Recent):  Temp: 98.6 °F (37 °C) (09/06/17 0800)  Pulse: 80 (09/06/17 0800)  Resp: 18 (09/06/17 0800)  BP: (!) 88/0 (09/06/17 0450)  SpO2: 95 % (09/06/17 0800) Vital Signs (24h Range):  Temp:  [97.8 °F (36.6 °C)-99 °F (37.2 °C)] 98.6 °F (37 °C)  Pulse:  [71-86] 80  Resp:  [16-18] 18  SpO2:  [95 %-99 %] 95 %  BP: ()/(0-71) 88/0     Weight: 78.2 kg (172 lb 6.4 oz)  Body mass index is 26.21 kg/m².      Intake/Output Summary (Last 24 hours) at 09/06/17 1045  Last data filed at 09/06/17 0400   Gross per 24 hour   Intake             1260 ml   Output              551 ml   Net              709 ml       Hemodynamic Parameters:           Physical Exam   Constitutional: He is oriented to person, place, and time. He appears well-developed and well-nourished.   HENT:   Head: Normocephalic and atraumatic.   Eyes: EOM are normal. Pupils are equal, round, and reactive to light.   Neck: Normal range of motion. Neck supple. JVD present.   Cardiovascular: Normal rate and regular rhythm.    No murmur heard.  VAD hum smooth    Pulmonary/Chest: Effort normal and breath sounds normal.   Abdominal: Soft. Bowel sounds are normal. He exhibits no distension. There is no tenderness. There is no guarding.   Musculoskeletal: Normal range of motion. He exhibits no edema.   Neurological: He is alert and oriented to person, place, and time.   Skin: Skin is warm and dry.   Nursing note and vitals reviewed.      Significant Labs:  CBC:    Recent Labs  Lab 09/06/17  0409   WBC 8.04   RBC 2.58*   HGB 7.2*   HCT 23.2*      MCV 90   MCH 27.9   MCHC 31.0*     BNP:    Recent Labs  Lab 09/06/17  0409   BNP 1,882*     CMP:    Recent Labs  Lab 09/06/17  0409   GLU 53*   CALCIUM 8.2*   ALBUMIN 2.2*   PROT 6.6      K 4.2   CO2 25   *   BUN 22   CREATININE 1.2   ALKPHOS 115   ALT 16   AST 23   BILITOT 1.2*      Coagulation:     Recent Labs  Lab 09/06/17  0409   INR 2.6*     LDH:    Recent Labs  Lab 09/04/17  0325 09/05/17  0337 09/06/17  0409   * 283* 285*     Microbiology:  Microbiology Results (last 7 days)     Procedure Component Value Units Date/Time    Blood culture [[id number]] Collected:  08/29/17 0836    Order Status:  Completed Specimen:  Blood from Line, Jugular, Internal Right Updated:  09/03/17 1022     Blood Culture, Routine No growth after 5 days.    Narrative:       FromTrialysis,prior to removing line.          I have reviewed all pertinent labs within the past 24 hours.    Estimated Creatinine Clearance: 57.8 mL/min (based on SCr of 1.2 mg/dL).    Diagnostic Results:  I have reviewed and interpreted all pertinent imaging results/findings within the past 24 hours.

## 2017-09-06 NOTE — PROGRESS NOTES
Daily E and M and VAD Interrogation Note    Reason for Visit: HM III implant   Patient is seen in follow up for management            [x] Other - HM III     Interval History:  [] Interval history unobtainable due to intubation.  The [x] implant/[] explant date was 7/27/17     Events -  NAEON.  Eating more solid food and Boost.      Review of Systems:   Negative except as above.     Medications:  Current Facility-Administered Medications   Medication Dose Route Frequency Provider Last Rate Last Dose    albumin human 5% bottle 500 mL  500 mL Intravenous PRN Shayne Espinoza MD   500 mL at 08/09/17 0700    albuterol nebulizer solution 2.5 mg  2.5 mg Nebulization Q4H PRN Shayne Espinoza MD        amlodipine tablet 10 mg  10 mg Oral Daily Geovanna Marques NP   10 mg at 09/06/17 0923    bisacodyl suppository 10 mg  10 mg Rectal Daily PRN Shayne Espinoza MD   10 mg at 09/01/17 0912    dextrose 50% injection 12.5 g  12.5 g Intravenous PRN Charbel Ritter MD   12.5 g at 08/30/17 0449    DOBUtamine 1000 mg in D5W 250 mL infusion (premix non-titrating)  5 mcg/kg/min (Dosing Weight) Intravenous Continuous Sunny Dowinng MD 6 mL/hr at 09/05/17 0609 5 mcg/kg/min at 09/05/17 0609    ertapenem (INVANZ) 1 g in sodium chloride 0.9 % 100 mL IVPB (ready to mix system)  1 g Intravenous Q24H Susannah Elizabeth PA-C 200 mL/hr at 09/05/17 2213 1 g at 09/05/17 2213    furosemide injection 20 mg  20 mg Intravenous BID Sarah Rao MD   20 mg at 09/06/17 0923    glucagon (human recombinant) injection 1 mg  1 mg Intramuscular PRN Charbel Ritter MD        hydrALAZINE injection 10 mg  10 mg Intravenous Q6H PRN Shlomo Serrato MD   10 mg at 09/05/17 0610    hydrALAZINE tablet 25 mg  25 mg Oral Q8H Geovanna Marques NP   25 mg at 09/06/17 0622    hydrocodone-acetaminophen 5-325mg per tablet 1 tablet  1 tablet Oral Q6H PRN Nadia Lucia, NP   1 tablet at 09/03/17 1520    insulin aspart pen 0-5 Units   0-5 Units Subcutaneous Q4H PRN Charbel Ritter MD   2 Units at 08/10/17 0751    lidocaine 5 % patch 1 patch  1 patch Transdermal Q24H Geovanna Marques NP   1 patch at 09/05/17 1205    magnesium oxide tablet 400 mg  400 mg Oral TID Geovanna Marques NP   400 mg at 09/06/17 0622    ondansetron injection 4 mg  4 mg Intravenous Q6H PRN Shayne Espinoza MD   4 mg at 09/03/17 1153    pantoprazole EC tablet 40 mg  40 mg Oral Daily Geovanna Marques NP   40 mg at 09/06/17 0922    potassium chloride SA CR tablet 40 mEq  40 mEq Oral BID Geovanna Marques NP   40 mEq at 09/06/17 0922    pravastatin tablet 20 mg  20 mg Oral QHS Geovanna Marques NP   20 mg at 09/05/17 2212    sodium chloride 0.9% flush 10 mL  10 mL Intravenous Q6H Sunny Downing MD   10 mL at 09/06/17 0622    And    sodium chloride 0.9% flush 10 mL  10 mL Intravenous PRN Sunny Downing MD           Physical Examination:  Vital Signs:   Vitals:    09/06/17 0800   BP:    Pulse: 80   Resp: 18   Temp: 98.6 °F (37 °C)     Cardiovascular:  [x] Regular rate and rhythm  []  Other  [x]  No edema []  Edema present  [x]  Clear to auscultation  VAD sounds smooth  Skin:  Incision is [x]  Clean, dry and intact.    Sternum:  [x]  Stable []  Unstable  Driveline(s):   [x]  Clean, dry and intact.     Labs:  CBC, BMP, LDH, INR reviewed    Procedure:  Device Interrogation including analysis of device parameters.  Current Settings   [x]  Ventricular Assist Device     Review of device function is [x]  Stable     TXP LVAD INTERROGATIONS 9/6/2017 9/5/2017 9/5/2017 9/5/2017 9/5/2017 9/5/2017 9/5/2017   Type HeartMate3 HeartMate3 HeartMate3 HeartMate3 HeartMate3 HeartMate3 HeartMate3   Flow 4.0 4.3 4.3 4.1 4.3 4.2 4.0   Speed 5200 5200 5200 5200 5200 5200 5200   PI 3.8 3.4 3.6 3.6 3.5 3.6 3.7   Power ([place]) 3.5 3.7 3.6 3.6 3.6 3.6 3.5   LSL 4800 4800 - - - - 4800   Pulsatility - - - - - - -   Some recent data might be hidden       Assessment:  [x]  Primary Cardiomyopathy [x]   Congestive Heart Failure   []  Atrial Fibrillation []  Ventricular Tachycardia   []  Aftercare cardiac device [x]  Long term (current) use of anticoagulants   []  Ventilator-associated pneumonia []  Pneumonia viral, unspecified   []  Pneumonia, bacterial, unspecified []  Pneumonia, organism unspecified   []  Hemorrhage of GI tract, unspecified    []  Nosebleed []  Driveline infection    [x]  Decubitus/sacral-improvement          Plan:  [x]  Interval history obtained from ICU attending team member during rounding today  [x]  VAD/MATT teaching performed with patient  [x]  Mobilization / Physical Therapy ongoing  [x]  Anticoagulation-Coumadin 2.5mg tonight     Cont Norvasc and hydralazine   Hypernatremia: resolved  Nutrition following   Wound care following  Specialty overlay bed  Continue inotropic support  Lasix 40mg BID  Regular diet   Will transfer to [facility] next week  Discharge planning ongoing

## 2017-09-06 NOTE — PROGRESS NOTES
Pt and wife walking halls with therapists.  Asked Mrs. Hayden to come to his room with us for education.  She is visibly upset, began to cry.  Spent 75 minutes at bedside with pt and wife providing intense emotional support and some VAD education.  Mostly emotional support.  All questions answered to their satisfaction as evidence by verbal acknowledgement.

## 2017-09-06 NOTE — PT/OT/SLP PROGRESS
"Physical Therapy  Treatment    Suman Hayden   MRN: [id number]   Admitting Diagnosis: LVAD (left ventricular assist device) present    PT Received On: 09/06/17  PT Start Time: 1354     PT Stop Time: 1432    PT Total Time (min): 38 min       Billable Minutes:  Gait [id number] and Therapeutic Activity 8    Treatment Type: Treatment  PT/PTA: PT     PTA Visit Number: 0       General Precautions: Standard, LVAD, fall, contact  Orthopedic Precautions: N/A   Braces: N/A    Do you have any cultural, spiritual, Yazdanism conflicts, given your current situation?: none noted     Subjective:  Communicated with RN prior to session.  "I can do whatever I want once I get home." re: pt education on safety awareness and improved mobility prior to d/c    Pain/Comfort  Pain Rating 1:  (did not rate)  Location - Side 1: Bilateral  Location 1: knee (with ambulation)  Pain Addressed 1: Reposition, Distraction (Pt refusing pain medication)    Objective:   Patient found with: telemetry, PICC line (LVAD to battery power)    Functional Mobility:  Bed Mobility:   Supine to Sit:  (not performed 2* pt UIC before and after treatment session)    Transfers:  Sit <> Stand Assistance: Moderate Assistance (x5 reps)  Sit <> Stand Assistive Device:  ([place] IV pole)    Max v/c and t/c during all reps on safe transfer techniques     Gait:   Gait Distance: 400 ft. with 4 seated rest breaks   Assistance 1: Minimum assistance, With assist of two (minAx1 for appropriate weight-shifts and balance; Pio of 2nd person for AD management; 3rd person to assist for chair follow)  Gait Assistive Device:  ([place] IV pole)  Gait Pattern: swing-through gait  Gait Deviation(s): decreased hal, increased time in double stance, decreased weight-shifting ability, decreased step length, decreased velocity of limb motion, decreased toe-to-floor clearance, excessive knee flexion   Emergency bag present throughout   Chair follow throughout    Assist provided throughout " for appropriate weight-shifts, forward progression, and balance.     Balance:   Static Sit: supervision   Dynamic Sit: SBA-CGA  Static Stand: CGA  Dynamic stand: Pio     Therapeutic Activities and Exercises:  Initiated therapy session but pt reported that he needed to use urinal. Upon doing so, pt soiled pants. Pt refusing to allow PT to assist with changing into clean pants. Pt's wife returned to room and assisted pt changing pants. PT returned to room following to begin mobility.   Pt found on battery power with vest donned. Required maxA to adjust waist strap and correctly place batteries in vest. Education provided throughout.   Performed sit<>stand with modA and static standing with CGA while pants and LVAD vest adjusted for proper fit. Pt fatigued with standing and required seated rest following.   Performed ambulation in hallway as described above with 4 intermittent seated rest breaks needed 2* impaired endurance, LE weakness, and B knee pain.      AM-PAC 6 CLICK MOBILITY  How much help from another person does this patient currently need?   1 = Unable, Total/Dependent Assistance  2 = A lot, Maximum/Moderate Assistance  3 = A little, Minimum/Contact Guard/Supervision  4 = None, Modified [place]/Independent    Turning over in bed (including adjusting bedclothes, sheets and blankets)?: 3  Sitting down on and standing up from a chair with arms (e.g., wheelchair, bedside commode, etc.): 2  Moving from lying on back to sitting on the side of the bed?: 3  Moving to and from a bed to a chair (including a wheelchair)?: 3  Need to walk in hospital room?: 2  Climbing 3-5 steps with a railing?: 1  Total Score: 14    AM-PAC Raw Score CMS G-Code Modifier Level of Impairment Assistance   6 % Total / Unable   7 - 9 CM 80 - 100% Maximal Assist   10 - 14 CL 60 - 80% Moderate Assist   15 - 19 CK 40 - 60% Moderate Assist   20 - 22 CJ 20 - 40% Minimal Assist   23 CI 1-20% SBA / CGA   24 CH 0% Independent/ Mod I      Patient left up in chair with all lines intact, call button in reach and pt's wife and 2 LVAD coordinators  present.    Assessment:  Suman Hayden is a 67 y.o. male with a medical diagnosis of LVAD (left ventricular assist device) present, inserted 17. Pt continues to require assistance to perform transfers and ambulation, despite sternal precautions now d/c'd. Pt with LE weakness and pain, contributing to decreased balance and limiting ambulation distance. Pt with minimal understanding of LVAD management, safety awareness, importance of maintaining safety during mobility. Pt educated each session but with little carry over noted. Pt would continue to benefit from skilled acute PT in order to address current deficits and progress functional mobility.     Rehab identified problem list/impairments: Rehab identified problem list/impairments: weakness, impaired functional mobilty, gait instability, impaired endurance, impaired balance, impaired self care skills, decreased lower extremity function, pain, decreased safety awareness, impaired cardiopulmonary response to activity, decreased coordination    Rehab potential is good.    Activity tolerance: Good    Discharge recommendations: Discharge Facility/Level Of Care Needs: rehabilitation facility     Barriers to discharge: Barriers to Discharge: Decreased caregiver support (at current functional level)    Equipment recommendations: Equipment Needed After Discharge:  (TBD)     GOALS:    Physical Therapy Goals        Problem: Physical Therapy Goal    Goal Priority Disciplines Outcome Goal Variances Interventions   Physical Therapy Goal     PT/OT, PT Ongoing (interventions implemented as appropriate)     Description:  Goals to be met by: 17     Patient will increase functional independence with mobility by performin. Supine to sit with MInimal Assistance-  Met 2017  2. Sit to supine with MInimal Assistance - not met  3. Sit to stand transfer with  Minimal Assistance- not met  4. Bed to chair transfer with Minimal Assistance- not met  5. Gait  x 50 feet with Minimal Assistance. -  Met 8/29/2017  6. Lower extremity exercise program x15 reps, with supervision, in order to increase LE strength and (I) with functional mobility. - not met  7.Pt mod I supine to sit- not met  8. Pt receive gait training ~ 220 ft with AD if needed and supervision- not met                          PLAN:    Patient to be seen 6 x/week  to address the above listed problems via gait training, therapeutic activities, therapeutic exercises  Plan of Care expires: 09/09/17  Plan of Care reviewed with: patient, spouse        Kelyshawna Willett, PT, DPT   9/6/2017  [phone]

## 2017-09-06 NOTE — PROGRESS NOTES
[facility]  Heart Transplant  Progress Note    Patient Name: Suman Hayden  MRN: [id number]  Admission Date: 7/18/2017  Hospital Length of Stay: 50 days  Attending Physician: Sunny Downing MD  Primary Care Provider: Joe Ernst MD  Principal Problem:LVAD (left ventricular assist device) present    Subjective:     Interval History: patient appetite increasing     Continuous Infusions:   DOBUTamine 5 mcg/kg/min (09/05/17 0609)     Scheduled Meds:   amlodipine  10 mg Oral Daily    ertapenem (INVANZ) IVPB  1 g Intravenous Q24H    furosemide  40 mg Intravenous BID    hydrALAZINE  25 mg Oral Q8H    lidocaine  1 patch Transdermal Q24H    magnesium oxide  400 mg Oral TID    pantoprazole  40 mg Oral Daily    potassium chloride SA  40 mEq Oral BID    pravastatin  20 mg Oral QHS    sodium chloride 0.9%  10 mL Intravenous Q6H    warfarin  2.5 mg Oral Once     PRN Meds:albumin human 5%, albuterol sulfate, bisacodyl, dextrose 50%, glucagon (human recombinant), hydrALAZINE, hydrocodone-acetaminophen 5-325mg, insulin aspart, ondansetron, Flushing PICC Protocol **AND** sodium chloride 0.9% **AND** sodium chloride 0.9%    Review of patient's allergies indicates:  No Known Allergies  Objective:     Vital Signs (Most Recent):  Temp: 98.6 °F (37 °C) (09/06/17 0800)  Pulse: 80 (09/06/17 0800)  Resp: 18 (09/06/17 0800)  BP: (!) 88/0 (09/06/17 0450)  SpO2: 95 % (09/06/17 0800) Vital Signs (24h Range):  Temp:  [97.8 °F (36.6 °C)-99 °F (37.2 °C)] 98.6 °F (37 °C)  Pulse:  [71-86] 80  Resp:  [16-18] 18  SpO2:  [95 %-99 %] 95 %  BP: ()/(0-71) 88/0     Weight: 78.2 kg (172 lb 6.4 oz)  Body mass index is 26.21 kg/m².      Intake/Output Summary (Last 24 hours) at 09/06/17 1045  Last data filed at 09/06/17 0400   Gross per 24 hour   Intake             1260 ml   Output              551 ml   Net              709 ml       Hemodynamic Parameters:           Physical Exam   Constitutional: He is oriented to person,  place, and time. He appears well-developed and well-nourished.   HENT:   Head: Normocephalic and atraumatic.   Eyes: EOM are normal. Pupils are equal, round, and reactive to light.   Neck: Normal range of motion. Neck supple. JVD present.   Cardiovascular: Normal rate and regular rhythm.    No murmur heard.  VAD hum smooth   Pulmonary/Chest: Effort normal and breath sounds normal.   Abdominal: Soft. Bowel sounds are normal. He exhibits no distension. There is no tenderness. There is no guarding.   Musculoskeletal: Normal range of motion. He exhibits no edema.   Neurological: He is alert and oriented to person, place, and time.   Skin: Skin is warm and dry.   Nursing note and vitals reviewed.      Significant Labs:  CBC:    Recent Labs  Lab 09/06/17  0409   WBC 8.04   RBC 2.58*   HGB 7.2*   HCT 23.2*      MCV 90   MCH 27.9   MCHC 31.0*     BNP:    Recent Labs  Lab 09/06/17  0409   BNP 1,882*     CMP:    Recent Labs  Lab 09/06/17  0409   GLU 53*   CALCIUM 8.2*   ALBUMIN 2.2*   PROT 6.6      K 4.2   CO2 25   *   BUN 22   CREATININE 1.2   ALKPHOS 115   ALT 16   AST 23   BILITOT 1.2*      Coagulation:     Recent Labs  Lab 09/06/17  0409   INR 2.6*     LDH:    Recent Labs  Lab 09/04/17  0325 09/05/17  0337 09/06/17  0409   * 283* 285*     Microbiology:  Microbiology Results (last 7 days)     Procedure Component Value Units Date/Time    Blood culture [[id number]] Collected:  08/29/17 0836    Order Status:  Completed Specimen:  Blood from Line, Jugular, Internal Right Updated:  09/03/17 1022     Blood Culture, Routine No growth after 5 days.    Narrative:       FromTrialysis,prior to removing line.          I have reviewed all pertinent labs within the past 24 hours.    Estimated Creatinine Clearance: 57.8 mL/min (based on SCr of 1.2 mg/dL).    Diagnostic Results:  I have reviewed and interpreted all pertinent imaging results/findings within the past 24 hours.    Assessment and Plan:     * LVAD  (left ventricular assist device) present    -HeartMate 3 Implanted 7/27/2017 as DT   -s/p delayed chest closure (7/28/17) and extubated (7/29/17), reintubated 8/9/17 and extubated 8/13/17  -CTS Primary  -Implanted by Dr. Downing  -Continue Coumadin, Goal INR 2.0-3.0. Therapeutic today . Dosing per CTS  -Antiplatelets : n/a  -LDH is stable overall today. Will continue to monitor daily.  -Speed set at 5200 rpm  -Interrogation notable for no events  -Not listed for OHTx           Stage III pressure ulcer of sacral region    - wound care        Bacteremia    - ESBL bacteremia.Cont ertapenem. Per ID- continue ertapenem x 4-6 weeks from 8/11. If discharged on ertapenem needs weekly cbc and cmp while on antibiotics (please fax results to ID clinic)  - blood cultures from 8/29 NGTD          Atrial fibrillation    -AC, Amio per C TS          Hyperglycemia    -per primary team        Atrial tachycardia    - [id number] - 3  -Rec restarting Amio. AC per CTS        AICD discharge    - Appropriate in the setting of VT aggravated by underlying AT/AFL (earlier during the admission). Device reprogrammed to VVI 80 this admission          Hepatitis B core antibody positive since 2012              V-tach    - Recommend resuming Amio, NSVT on tele        Hyperlipidemia    -continue pravastatin        Essential hypertension    -Patient is pulsatile  -MAP goal 60-80 mmHg  -Blood pressure uncontrolled over the last 24 hours  -Antihypertensive medications include Amlodipine and Hydralazine added   Will monitor trends              CHF (NYHA class IV, ACC/AHA stage D)    -NICM  -Last 2D Echo 9/1/2017: LVEF 10-15%, LVEDD 6.3 cm  -Hypervolemic on examination today  -Current diuretic regimen: Furosemide IVP. Net even over the last 24 hours. Will I/Os not accurate. Diuretic regimen per primary.  -GDMT with n/a  -2g Na dietary restriction, 1500 mL fluid restriction, strict I/Os              MELISSA Long  Heart Transplant  [facility]  [place]

## 2017-09-06 NOTE — PLAN OF CARE
Problem: Physical Therapy Goal  Goal: Physical Therapy Goal  Goals to be met by: 17     Patient will increase functional independence with mobility by performin. Supine to sit with MInimal Assistance-  Met 2017  2. Sit to supine with MInimal Assistance - not met  3. Sit to stand transfer with Minimal Assistance- not met  4. Bed to chair transfer with Minimal Assistance- not met  5. Gait  x 50 feet with Minimal Assistance. -  Met 2017  6. Lower extremity exercise program x15 reps, with supervision, in order to increase LE strength and (I) with functional mobility. - not met  7.Pt mod I supine to sit- not met  8. Pt receive gait training ~ 220 ft with AD if needed and supervision- not met         Outcome: Ongoing (interventions implemented as appropriate)  Goals reviewed and remain appropriate. Pt progressing towards goals.    Kely Willett, PT, DPT   2017  [phone]

## 2017-09-06 NOTE — PROGRESS NOTES
Follow up on sacral pressure injuries   Sacral area much improved with use of barrier creams and offloading devices for chair and mattress for bed.   Ulcers to coccyx and left buttock resolving. Right buttock still has some depth and barrier creams have been used to coat area, so photo was not able to be obtained at this time. Agreeable to stand for photo later in the week.   Sy Nguyen RN CWON  [id number]

## 2017-09-06 NOTE — PROGRESS NOTES
LVAD dsg change done using sterile technique with soap and water by spuse;  RADHAES 2 - Pink, healthy tissue incorporating into the driveline with little or no erythema, tenderness, or drainage; Spouse is now checked off on dressing;  Monitoring. Spouse is checked off on dressing change

## 2017-09-07 LAB
ANION GAP SERPL CALC-SCNC: 11 MMOL/L
BASOPHILS # BLD AUTO: 0.07 K/UL
BASOPHILS NFR BLD: 1.1 %
BUN SERPL-MCNC: 22 MG/DL
CALCIUM SERPL-MCNC: 8.4 MG/DL
CHLORIDE SERPL-SCNC: 111 MMOL/L
CO2 SERPL-SCNC: 22 MMOL/L
CREAT SERPL-MCNC: 1.3 MG/DL
DIFFERENTIAL METHOD: ABNORMAL
EOSINOPHIL # BLD AUTO: 0.2 K/UL
EOSINOPHIL NFR BLD: 2.7 %
ERYTHROCYTE [DISTWIDTH] IN BLOOD BY AUTOMATED COUNT: 17.9 %
EST. GFR  (AFRICAN AMERICAN): >60 ML/MIN/1.73 M^2
EST. GFR  (NON AFRICAN AMERICAN): 56.5 ML/MIN/1.73 M^2
GLUCOSE SERPL-MCNC: 25 MG/DL
HCT VFR BLD AUTO: 22.6 %
HGB BLD-MCNC: 6.9 G/DL
INR PPP: 3.1
LDH SERPL L TO P-CCNC: 270 U/L
LYMPHOCYTES # BLD AUTO: 1.1 K/UL
LYMPHOCYTES NFR BLD: 17.5 %
MAGNESIUM SERPL-MCNC: 1.7 MG/DL
MCH RBC QN AUTO: 27.4 PG
MCHC RBC AUTO-ENTMCNC: 30.5 G/DL
MCV RBC AUTO: 90 FL
MONOCYTES # BLD AUTO: 0.7 K/UL
MONOCYTES NFR BLD: 11 %
NEUTROPHILS # BLD AUTO: 4.3 K/UL
NEUTROPHILS NFR BLD: 67.4 %
PHOSPHATE SERPL-MCNC: 3.5 MG/DL
PLATELET # BLD AUTO: 228 K/UL
PMV BLD AUTO: 11.5 FL
POCT GLUCOSE: 24 MG/DL (ref 70–110)
POCT GLUCOSE: 24 MG/DL (ref 70–110)
POCT GLUCOSE: 66 MG/DL (ref 70–110)
POCT GLUCOSE: 78 MG/DL (ref 70–110)
POCT GLUCOSE: 87 MG/DL (ref 70–110)
POCT GLUCOSE: 88 MG/DL (ref 70–110)
POTASSIUM SERPL-SCNC: 4.5 MMOL/L
PROTHROMBIN TIME: 31.2 SEC
RBC # BLD AUTO: 2.52 M/UL
SODIUM SERPL-SCNC: 144 MMOL/L
WBC # BLD AUTO: 6.36 K/UL

## 2017-09-07 PROCEDURE — 63600175 PHARM REV CODE 636 W HCPCS: Performed by: PHYSICIAN ASSISTANT

## 2017-09-07 PROCEDURE — 85610 PROTHROMBIN TIME: CPT

## 2017-09-07 PROCEDURE — 97116 GAIT TRAINING THERAPY: CPT

## 2017-09-07 PROCEDURE — 99233 SBSQ HOSP IP/OBS HIGH 50: CPT | Mod: GC,,, | Performed by: INTERNAL MEDICINE

## 2017-09-07 PROCEDURE — 97530 THERAPEUTIC ACTIVITIES: CPT

## 2017-09-07 PROCEDURE — 27000248 HC VAD-ADDITIONAL DAY

## 2017-09-07 PROCEDURE — 20600001 HC STEP DOWN PRIVATE ROOM

## 2017-09-07 PROCEDURE — 83615 LACTATE (LD) (LDH) ENZYME: CPT

## 2017-09-07 PROCEDURE — 97110 THERAPEUTIC EXERCISES: CPT

## 2017-09-07 PROCEDURE — 80048 BASIC METABOLIC PNL TOTAL CA: CPT

## 2017-09-07 PROCEDURE — 25000003 PHARM REV CODE 250: Performed by: THORACIC SURGERY (CARDIOTHORACIC VASCULAR SURGERY)

## 2017-09-07 PROCEDURE — 83735 ASSAY OF MAGNESIUM: CPT

## 2017-09-07 PROCEDURE — 25000003 PHARM REV CODE 250: Performed by: NURSE PRACTITIONER

## 2017-09-07 PROCEDURE — 63600175 PHARM REV CODE 636 W HCPCS: Performed by: NURSE PRACTITIONER

## 2017-09-07 PROCEDURE — 93010 ELECTROCARDIOGRAM REPORT: CPT | Mod: ,,, | Performed by: INTERNAL MEDICINE

## 2017-09-07 PROCEDURE — 84100 ASSAY OF PHOSPHORUS: CPT

## 2017-09-07 PROCEDURE — 93005 ELECTROCARDIOGRAM TRACING: CPT

## 2017-09-07 PROCEDURE — 85025 COMPLETE CBC W/AUTO DIFF WBC: CPT

## 2017-09-07 PROCEDURE — 97535 SELF CARE MNGMENT TRAINING: CPT

## 2017-09-07 PROCEDURE — A4216 STERILE WATER/SALINE, 10 ML: HCPCS | Performed by: THORACIC SURGERY (CARDIOTHORACIC VASCULAR SURGERY)

## 2017-09-07 RX ORDER — DEXTROSE 25 % IN WATER 25 %
25 SYRINGE (ML) INTRAVENOUS ONCE
Status: DISCONTINUED | OUTPATIENT
Start: 2017-09-07 | End: 2017-09-07

## 2017-09-07 RX ORDER — CEFAZOLIN SODIUM 2 G/50ML
2 SOLUTION INTRAVENOUS
Status: DISCONTINUED | OUTPATIENT
Start: 2017-09-07 | End: 2017-09-22 | Stop reason: HOSPADM

## 2017-09-07 RX ORDER — DEXTROSE 25 % IN WATER 25 %
50 SYRINGE (ML) INTRAVENOUS ONCE
Status: DISCONTINUED | OUTPATIENT
Start: 2017-09-07 | End: 2017-09-07

## 2017-09-07 RX ORDER — WARFARIN 1 MG/1
1 TABLET ORAL ONCE
Status: DISCONTINUED | OUTPATIENT
Start: 2017-09-07 | End: 2017-09-07

## 2017-09-07 RX ORDER — LANOLIN ALCOHOL/MO/W.PET/CERES
800 CREAM (GRAM) TOPICAL 3 TIMES DAILY
Status: DISCONTINUED | OUTPATIENT
Start: 2017-09-07 | End: 2017-09-13

## 2017-09-07 RX ORDER — HYDRALAZINE HYDROCHLORIDE 50 MG/1
50 TABLET, FILM COATED ORAL EVERY 8 HOURS
Status: DISCONTINUED | OUTPATIENT
Start: 2017-09-07 | End: 2017-09-20

## 2017-09-07 RX ORDER — WARFARIN 2 MG/1
2 TABLET ORAL ONCE
Status: COMPLETED | OUTPATIENT
Start: 2017-09-07 | End: 2017-09-07

## 2017-09-07 RX ADMIN — WARFARIN SODIUM 2 MG: 2 TABLET ORAL at 05:09

## 2017-09-07 RX ADMIN — AMLODIPINE BESYLATE 10 MG: 10 TABLET ORAL at 09:09

## 2017-09-07 RX ADMIN — MAGNESIUM OXIDE TAB 400 MG (241.3 MG ELEMENTAL MG) 800 MG: 400 (241.3 MG) TAB at 09:09

## 2017-09-07 RX ADMIN — FUROSEMIDE 40 MG: 10 INJECTION, SOLUTION INTRAVENOUS at 05:09

## 2017-09-07 RX ADMIN — POTASSIUM CHLORIDE 40 MEQ: 1500 TABLET, EXTENDED RELEASE ORAL at 09:09

## 2017-09-07 RX ADMIN — LIDOCAINE 1 PATCH: 50 PATCH TOPICAL at 12:09

## 2017-09-07 RX ADMIN — HYDRALAZINE HYDROCHLORIDE 50 MG: 50 TABLET ORAL at 09:09

## 2017-09-07 RX ADMIN — HYDRALAZINE HYDROCHLORIDE 25 MG: 25 TABLET, FILM COATED ORAL at 05:09

## 2017-09-07 RX ADMIN — PANTOPRAZOLE SODIUM 40 MG: 40 TABLET, DELAYED RELEASE ORAL at 09:09

## 2017-09-07 RX ADMIN — FUROSEMIDE 40 MG: 10 INJECTION, SOLUTION INTRAVENOUS at 09:09

## 2017-09-07 RX ADMIN — MAGNESIUM OXIDE TAB 400 MG (241.3 MG ELEMENTAL MG) 400 MG: 400 (241.3 MG) TAB at 05:09

## 2017-09-07 RX ADMIN — PRAVASTATIN SODIUM 20 MG: 20 TABLET ORAL at 09:09

## 2017-09-07 RX ADMIN — DEXTROSE MONOHYDRATE 25 G: 25 INJECTION, SOLUTION INTRAVENOUS at 09:09

## 2017-09-07 RX ADMIN — Medication 10 ML: at 05:09

## 2017-09-07 RX ADMIN — MAGNESIUM OXIDE TAB 400 MG (241.3 MG ELEMENTAL MG) 800 MG: 400 (241.3 MG) TAB at 02:09

## 2017-09-07 RX ADMIN — ERTAPENEM SODIUM 1 G: 1 INJECTION, POWDER, LYOPHILIZED, FOR SOLUTION INTRAMUSCULAR; INTRAVENOUS at 09:09

## 2017-09-07 RX ADMIN — HYDRALAZINE HYDROCHLORIDE 50 MG: 50 TABLET ORAL at 02:09

## 2017-09-07 RX ADMIN — Medication 10 ML: at 12:09

## 2017-09-07 NOTE — PT/OT/SLP PROGRESS
"Occupational Therapy  Treatment    Suman Hayden   MRN: [id number]   Admitting Diagnosis: LVAD (left ventricular assist device) present    OT Date of Treatment: 09/07/17   OT Start Time: 0930  OT Stop Time: 1015  OT Total Time (min): 45 min    Billable Minutes:  Self Care/Home Management 30 and Therapeutic Activity 15    General Precautions: Standard, LVAD, fall, contact       Subjective:  Communicated with RN prior to session.  " I am ok"- Pt agreeable to OT session   Pain/Comfort  Pain Rating 1: 0/10    Objective:  Patient found reclined in bed  with: telemetry, PICC line (LVAD to mobile power unit ), spouse at bedside      Functional Mobility:  Bed Mobility:  Supine to Sit: Contact Guard Assistance  Sit to Supine: Minimum Assistance    Transfers:   Sit <> Stand Assistance: Moderate Assistance  Sit <> Stand Assistive Device: No Assistive Device  Bed <> Chair Technique: Stand Pivot  Bed <> Chair Transfer Assistance: Moderate Assistance  Bed <> Chair Assistive Device: No Assistive Device    Functional Ambulation: Pt assisted to b/s chair this visit     Activities of Daily Living:  UE Dressing Level of Assistance: Moderate assistance (donning overhead shirt)  LE Dressing Level of Assistance: Moderate assistance (donning underwear, donning pants) . Pt needs assist with threading RLE and pulling pants to waist     Balance:   Static Sit: GOOD-: Takes MODERATE challenges from all directions but inconsistently  Dynamic Sit: FAIR+: Maintains balance through MINIMAL excursions of active trunk motion  Static Stand:Fair-  Dynamic stand: Poor plus     Therapeutic Activities and Exercises:  Pt educated on role of OT, plan of care, safety awareness, DME, importance of out of bed activity, energy conservation techniques  LVAD education included equipment names, battery rotation, daily log, contents of emergency bag, consolidation bag;. Pt needs Min to Mod Assist with LVAD management. Pt able to perform LVAD controller daily self test " "with Supervision.       AM-PAC 6 CLICK ADL   How much help from another person does this patient currently need?   1 = Unable, Total/Dependent Assistance  2 = A lot, Maximum/Moderate Assistance  3 = A little, Minimum/Contact Guard/Supervision  4 = None, Modified [place]/Independent    Putting on and taking off regular lower body clothing? : 2  Bathing (including washing, rinsing, drying)?: 2  Toileting, which includes using toilet, bedpan, or urinal? : 2  Putting on and taking off regular upper body clothing?: 2  Taking care of personal grooming such as brushing teeth?: 3  Eating meals?: 3  Total Score: 14     AM-PAC Raw Score CMS "G-Code Modifier Level of Impairment Assistance   6 % Total / Unable   7 - 8 CM 80 - 100% Maximal Assist   9-13 CL 60 - 80% Moderate Assist   14 - 19 CK 40 - 60% Moderate Assist   20 - 22 CJ 20 - 40% Minimal Assist   23 CI 1-20% SBA / CGA   24 CH 0% Independent/ Mod I       Patient left up in chair with all lines intact, call button in reach and RN notified    ASSESSMENT:  Suman Hayden is a 67 y.o. male with a medical diagnosis of LVAD (left ventricular assist device) present and presents with improved overall activity tolerance and improved ADL performance this visit. Pt continues to require Mod Assist with bed to chair transfers. Pt tolerated OT treatment session well. Continue OT plan of care    Rehab identified problem list/impairments: Rehab identified problem list/impairments: weakness, impaired endurance, impaired self care skills, impaired functional mobilty, impaired balance, impaired cardiopulmonary response to activity    Rehab potential is good.    Activity tolerance: Good    Discharge recommendations: Discharge Facility/Level Of Care Needs: rehabilitation facility     Barriers to discharge: Barriers to Discharge: None    Equipment recommendations:  (TBD closer to d/c date)     GOALS:    Occupational Therapy Goals        Problem: Occupational Therapy Goal    Goal " Priority Disciplines Outcome Interventions   Occupational Therapy Goal     OT, PT/OT Ongoing (interventions implemented as appropriate)    Description:  Goals to be met by:  2 weeks 9/12/17    Patient will increase functional independence with ADLs by performing:  Feeding: Independent   UE Dressing with Supervision.  LE Dressing with Supervision.  Grooming while standing with Supervision.  Toileting from toilet with Supervision for hygiene and clothing management.   Stand pivot transfers with Supervision.  Toilet transfer to toilet with Supervision.  Pt will be supervision  with LVAD yasmin't.     Goals to be met by:  2 weeks 8/28/17    Patient will increase functional independence with ADLs by performing:  Feeding: Independent   UE Dressing with Supervision.  LE Dressing with Supervision.  Grooming while standing with Supervision.  Toileting from toilet with Supervision for hygiene and clothing management.   Stand pivot transfers with Supervision.  Toilet transfer to toilet with Supervision.  Pt will be supervision  with LVAD yasmin't.                   Multidisciplinary Problems (Resolved)        Problem: Occupational Therapy Goal    Goal Priority Disciplines Outcome Interventions   Occupational Therapy Goal   (Resolved)     OT, PT/OT  Error    Description:  Goals to be met by: 8/04/2017    Patient will increase functional independence with ADLs by performing:    Increased functional strength to 5/5 for improved ADL performance.  Upper extremity exercise program and R LE ankle pumps  3x 10 reps per handout, with independence.                      Plan:  Patient to be seen 6 x/week to address the above listed problems via self-care/home management, therapeutic activities, therapeutic exercises  Plan of Care expires: 09/21/17  Plan of Care reviewed with: patient, spouse         Beatriz Dubose, OT  09/07/2017

## 2017-09-07 NOTE — PLAN OF CARE
Problem: Physical Therapy Goal  Goal: Physical Therapy Goal  Goals to be met by: 17     Patient will increase functional independence with mobility by performin. Supine to sit with MInimal Assistance-  Met 2017  2. Sit to supine with MInimal Assistance - not met  3. Sit to stand transfer with Minimal Assistance- not met  4. Bed to chair transfer with Minimal Assistance- not met  5. Gait  x 50 feet with Minimal Assistance. -  Met 2017  6. Lower extremity exercise program x15 reps, with supervision, in order to increase LE strength and (I) with functional mobility. - not met  7.Pt mod I supine to sit- not met  8. Pt receive gait training ~ 220 ft with AD if needed and supervision- not met         Outcome: Ongoing (interventions implemented as appropriate)  Pt progressing towards goals; continue current POC.     Tonja Vallecillo, PT, DPT   2017  Pager: [phone]

## 2017-09-07 NOTE — PROGRESS NOTES
SW to pt's room for update attempt. Pt not in room. Per PT, pt and wife are outside. SW providing psychosocial and counseling support, education, resources, and d/c planning as needed. SW continuing to follow and remains available.

## 2017-09-07 NOTE — PLAN OF CARE
Problem: Occupational Therapy Goal  Goal: Occupational Therapy Goal  Goals to be met by:  2 weeks 9/12/17    Patient will increase functional independence with ADLs by performing:  Feeding: Independent   UE Dressing with Supervision.  LE Dressing with Supervision.  Grooming while standing with Supervision.  Toileting from toilet with Supervision for hygiene and clothing management.   Stand pivot transfers with Supervision.  Toilet transfer to toilet with Supervision.  Pt will be supervision  with LVAD yasmin't.     Goals to be met by:  2 weeks 8/28/17    Patient will increase functional independence with ADLs by performing:  Feeding: Independent   UE Dressing with Supervision.  LE Dressing with Supervision.  Grooming while standing with Supervision.  Toileting from toilet with Supervision for hygiene and clothing management.   Stand pivot transfers with Supervision.  Toilet transfer to toilet with Supervision.  Pt will be supervision  with LVAD yasmin't.        Outcome: Ongoing (interventions implemented as appropriate)  Continue OT plan of care. Pt making good progress with OT goals and outcomes

## 2017-09-07 NOTE — PROGRESS NOTES
Pt and wife AAAO.  Spent 2.5 hours at bedside providing verbal and hands on VAD education.  Both practiced changing controllers.  They are instructed to study alarms tonight and we will continue with this tomorrow.      Through out the 2.5 hours of education, Mrs. Hayden became visibly upset a few times.  Once when we talked about red heart alarms and then when we started to discuss controller changes.  She at some times yells, other times cries.  She was re-directed and also allowed to vent her frustrations.  Intense emotional support provided to wife.  All questions answered to pt and wife's satisfaction as evidence by verbal acknowledgement.

## 2017-09-07 NOTE — PROGRESS NOTES
Daily E and M and VAD Interrogation Note    Reason for Visit: HM III implant   Patient is seen in follow up for management            [x] Other - HM III     Interval History:  [] Interval history unobtainable due to intubation.  The [x] implant/[] explant date was 7/27/17     Events -  NAEON.  Eating more solid food and Boost.      Review of Systems:   Negative except as above.     Medications:  Current Facility-Administered Medications   Medication Dose Route Frequency Provider Last Rate Last Dose    albuterol nebulizer solution 2.5 mg  2.5 mg Nebulization Q4H PRN Shayne Espinoza MD        amlodipine tablet 10 mg  10 mg Oral Daily Geovanna Marques NP   10 mg at 09/06/17 0923    bisacodyl suppository 10 mg  10 mg Rectal Daily PRN Shayne Espinoza MD   10 mg at 09/01/17 0912    dextrose 50% injection 12.5 g  12.5 g Intravenous PRN Charbel Ritter MD   12.5 g at 08/30/17 0449    dextrose injection 25 g  25 g Intravenous Once Geovanna Marques NP        DOBUtamine 1000 mg in D5W 250 mL infusion (premix non-titrating)  5 mcg/kg/min (Dosing Weight) Intravenous Continuous Sunny Downing MD 6 mL/hr at 09/06/17 2341 5 mcg/kg/min at 09/06/17 2341    ertapenem (INVANZ) 1 g in sodium chloride 0.9 % 100 mL IVPB (ready to mix system)  1 g Intravenous Q24H Susannah Elizabeth PA-C 200 mL/hr at 09/06/17 2016 1 g at 09/06/17 2016    furosemide injection 40 mg  40 mg Intravenous BID Geovanna Marques NP   40 mg at 09/06/17 1759    glucagon (human recombinant) injection 1 mg  1 mg Intramuscular PRN hCarbel Ritter MD        hydrALAZINE injection 10 mg  10 mg Intravenous Q6H PRN Shlomo Serrato MD   10 mg at 09/05/17 0610    hydrALAZINE tablet 50 mg  50 mg Oral Q8H Geovanna Marques NP        hydrocodone-acetaminophen 5-325mg per tablet 1 tablet  1 tablet Oral Q6H PRN Nadia Lucia NP   1 tablet at 09/06/17 1546    insulin aspart pen 0-5 Units  0-5 Units Subcutaneous Q4H PRN Charbel Ritter MD   2  Units at 08/10/17 0751    lidocaine 5 % patch 1 patch  1 patch Transdermal Q24H Geovanna Marques NP   1 patch at 09/06/17 1211    magnesium oxide tablet 800 mg  800 mg Oral TID Geovanna Marques NP        ondansetron injection 4 mg  4 mg Intravenous Q6H PRN Shayne Espinoza MD   4 mg at 09/06/17 1533    pantoprazole EC tablet 40 mg  40 mg Oral Daily Geovanna Marques NP   40 mg at 09/06/17 0922    potassium chloride SA CR tablet 40 mEq  40 mEq Oral BID Geovanna Marques NP   40 mEq at 09/06/17 2015    pravastatin tablet 20 mg  20 mg Oral QHS Geovanna Marques NP   20 mg at 09/06/17 2016    sodium chloride 0.9% flush 10 mL  10 mL Intravenous Q6H Sunny Downing MD   10 mL at 09/07/17 0516    And    sodium chloride 0.9% flush 10 mL  10 mL Intravenous PRN Sunny Downing MD        warfarin (COUMADIN) tablet 1 mg  1 mg Oral Once Geovanna Marques NP           Physical Examination:  Vital Signs:   Vitals:    09/07/17 0845   BP: 105/69   Pulse: 92   Resp: 20   Temp: 98.3 °F (36.8 °C)     Cardiovascular:  [x] Regular rate and rhythm  []  Other  [x]  No edema []  Edema present  [x]  Clear to auscultation  VAD sounds smooth  Skin:  Incision is [x]  Clean, dry and intact.    Sternum:  [x]  Stable []  Unstable  Driveline(s):   [x]  Clean, dry and intact.     Labs:  CBC, BMP, LDH, INR reviewed    Procedure:  Device Interrogation including analysis of device parameters.  Current Settings   [x]  Ventricular Assist Device     Review of device function is [x]  Stable     TXP LVAD INTERROGATIONS 9/7/2017 9/6/2017 9/6/2017 9/6/2017 9/6/2017 9/6/2017 9/5/2017   Type HeartMate3 HeartMate3 - HeartMate3 HeartMate3 HeartMate3 HeartMate3   Flow 4.3 4.3 4.1 4.0 4.1 4.0 4.3   Speed 5200 5200 5200 5200 5200 5200 5200   PI 3.5 3.8 3.6 3.7 3.6 3.8 3.4   Power ([place]) 3.5 3.5 3.6 3.5 3.6 3.5 3.7   LSL - - - - - 4800 4800   Pulsatility - - Pulse - - - -   Some recent data might be hidden       Assessment:  [x]  Primary Cardiomyopathy [x]   Congestive Heart Failure   []  Atrial Fibrillation []  Ventricular Tachycardia   []  Aftercare cardiac device [x]  Long term (current) use of anticoagulants   []  Ventilator-associated pneumonia []  Pneumonia viral, unspecified   []  Pneumonia, bacterial, unspecified []  Pneumonia, organism unspecified   []  Hemorrhage of GI tract, unspecified    []  Nosebleed []  Driveline infection    [x]  Decubitus/sacral-improvement          Plan:  [x]  Interval history obtained from ICU attending team member during rounding today  [x]  VAD/MATT teaching performed with patient  [x]  Mobilization / Physical Therapy ongoing  [x]  Anticoagulation-Coumadin 1mg tonight     Re consult EP for new AICD lead  Cont Norvasc and increased hydralazine   Hypernatremia: resolved  Nutrition following   Wound care following  Specialty overlay bed  Continue inotropic support  Lasix 40mg BID  Regular diet   Will transfer to [facility] next week  Discharge planning ongoing     Total time spent was 36 minutes.  Of which more than 50 percent of the care dominated counseling and coordinating care with different team members. The VAD was interrogated and all parameters were WNL and no significant findings were found in the history. All these findings are documented in the note above.      Date of Service: 09/07/2017

## 2017-09-07 NOTE — PLAN OF CARE
Problem: Patient Care Overview  Goal: Plan of Care Review  Outcome: Ongoing (interventions implemented as appropriate)  Patient cooperative and pleasant with routine care and procedures.  Wife at bedside and participating in care and routine procedures.  Resting quietly without complaints.  Turning self from side to side during the night to remain off back.  Will continue to monitor.

## 2017-09-07 NOTE — PROGRESS NOTES
Patient blood glucose 66. A. Jerald, DARLING notified. NP stated to give patient orange juice. Will continue to monitor.

## 2017-09-07 NOTE — PT/OT/SLP PROGRESS
Physical Therapy  Treatment    Suman Hayden   MRN: [id number]   Admitting Diagnosis: LVAD (left ventricular assist device) present    PT Received On: 09/07/17  PT Start Time: 1324     PT Stop Time: 1450    PT Total Time (min): 86 min       Billable Minutes:  Gait [id number] min, Therapeutic Activity 25 min and Therapeutic Exercise 10 min    Treatment Type: Treatment  PT/PTA: PT     PTA Visit Number: 0       General Precautions: Standard, LVAD, fall  Orthopedic Precautions: N/A   Braces: N/A    Do you have any cultural, spiritual, Anglican conflicts, given your current situation?: none noted     Subjective:  Communicated with RN prior to session. RN agreeable to therapist and therapy tech taking patient outside for portion of treatment session. Pt agreeable to participate in therapy session, reported feeling excited to go outside and get fresh air. Pt's wife present throughout session. Pt in good spirits following session, reported feeling energized by going outside.     Pain/Comfort  Pain Rating 1: 0/10  Pain Rating Post-Intervention 1: 0/10    Objective:   Patient found with: telemetry, PICC line (LVAD to wall power)    Functional Mobility:  Bed Mobility:    N/T 2/2 pt sitting up in chair upon PT arrival     Transfers:  Sit <> Stand Assistance:   - Moderate Assistance from bedside chair x 1st trial  - Moderate Assistance from bedside commode x 1 trial   - Maximum Assistance from wheelchair x 3 trials   Sit <> Stand Assistive Device: Rolling Walker  Toilet Transfer Technique: Stand Pivot  Toilet Transfer Assistance: Moderate Assistance  Toilet Transfer Assistive Device: Rolling Walker    Gait:   Gait Distance: 50 ft. + 20 ft. + 20 ft.   Assistance 1: Minimum assistance  Gait Assistive Device: Rolling walker  Gait Pattern: reciprocal  Gait Deviation(s): decreased hal, decreased step length, decreased stride length, decreased toe-to-floor clearance, backward lean (narrow base of support)     Balance:   Static Sit:  GOOD: Takes MODERATE challenges from all directions  Dynamic Sit: GOOD: Maintains balance through MODERATE excursions of active trunk movement  Static Stand: FAIR: Maintains without assist but unable to take challenges  Dynamic stand: FAIR: Needs CONTACT GUARD during gait     Therapeutic Activities and Exercises:  Therapeutic activities aimed to increase pt's independence, safety, and efficiency with LVAD management, bed mobility and functional transfers. See above for assistance levels.  Pt sitting up in chair upon PT arrival with LVAD connected to wall power. Therapist facilitated pt transitioning from LVAD wall power to battery power while providing education on LVAD management.  Pt required moderate to maximum assistance from therapist to safely transition to battery power and don holster. Several trials of sit<>stand facilitated with therapist assistance-skilled cueing provided on sequencing of motor task and RW management to improve safety.     Therapist facilitated progression of gait training to improve gait stability, endurance, and independence with functional ambulation.  First trial of gait performed within [facility] on flat surface x 50 ft. Pt then escorted outside with wife, therapist, and therapy tech for additional gait training and therex. RN aware. Therapist facilitated additional 2 trials of gait training outside on altered surface (i.e. Brick pavement) to improve pt's gait stability and dynamic standing balance.      Therex for BLE strengthening:   - standing marches x 10 reps with BUE support with RW use   - seated LAQ x 10 reps   - ankle pumps x 10 reps     AM-PAC 6 CLICK MOBILITY  How much help from another person does this patient currently need?   1 = Unable, Total/Dependent Assistance  2 = A lot, Maximum/Moderate Assistance  3 = A little, Minimum/Contact Guard/Supervision  4 = None, Modified [place]/Independent    Turning over in bed (including adjusting bedclothes, sheets  and blankets)?: 3  Sitting down on and standing up from a chair with arms (e.g., wheelchair, bedside commode, etc.): 2  Moving from lying on back to sitting on the side of the bed?: 3  Moving to and from a bed to a chair (including a wheelchair)?: 3  Need to walk in hospital room?: 3  Climbing 3-5 steps with a railing?: 2  Total Score: 16    AM-PAC Raw Score CMS G-Code Modifier Level of Impairment Assistance   6 % Total / Unable   7 - 9 CM 80 - 100% Maximal Assist   10 - 14 CL 60 - 80% Moderate Assist   15 - 19 CK 40 - 60% Moderate Assist   20 - 22 CJ 20 - 40% Minimal Assist   23 CI 1-20% SBA / CGA   24 CH 0% Independent/ Mod I     Patient left up in chair with all lines intact, call button in reach, RN notified and RN and wife present.    Assessment:  Suman Hayden is a 67 y.o. male with a medical diagnosis of LVAD (left ventricular assist device) present. Pt demonstrated good effort and participation in therapy session this visit; pt highly motivated by going outside due to prolonged hospital stay. Pt introduced to RW this visit, with gait training performed within hallway and on altered surfaces outside to improve dynamic standing balance. Pt continues to demonstrate BLE weakness, deconditioning,and impaired balance. Pt would benefit from continued PT intervention to address below listed deficits and maximize return to PLOF.       Rehab identified problem list/impairments: Rehab identified problem list/impairments: weakness, impaired endurance, impaired functional mobilty, gait instability, impaired self care skills, impaired balance, decreased lower extremity function, decreased safety awareness, impaired cardiopulmonary response to activity    Rehab potential is good.    Activity tolerance: Good    Discharge recommendations: Discharge Facility/Level Of Care Needs: rehabilitation facility     Barriers to discharge: Barriers to Discharge: None    Equipment recommendations: Equipment Needed After Discharge:   (TBD )     GOALS:    Physical Therapy Goals        Problem: Physical Therapy Goal    Goal Priority Disciplines Outcome Goal Variances Interventions   Physical Therapy Goal     PT/OT, PT Ongoing (interventions implemented as appropriate)     Description:  Goals to be met by: 17     Patient will increase functional independence with mobility by performin. Supine to sit with MInimal Assistance-  Met 2017  2. Sit to supine with MInimal Assistance - not met  3. Sit to stand transfer with Minimal Assistance- not met  4. Bed to chair transfer with Minimal Assistance- not met  5. Gait  x 50 feet with Minimal Assistance. -  Met 2017  6. Lower extremity exercise program x15 reps, with supervision, in order to increase LE strength and (I) with functional mobility. - not met  7.Pt mod I supine to sit- not met  8. Pt receive gait training ~ 220 ft with AD if needed and supervision- not met                          PLAN:    Patient to be seen 6 x/week  to address the above listed problems via gait training, therapeutic activities, therapeutic exercises, neuromuscular re-education  Plan of Care expires: 17  Plan of Care reviewed with: patient, spouse        Tonja Vallecillo, PT, DPT   2017  Pager: [phone]

## 2017-09-07 NOTE — PROGRESS NOTES
SW to pt's room for update attempt. Pt getting bathed at this time. SW providing psychosocial and counseling support, education, resources, and d/c planning as needed. SW continuing to follow and remains available.

## 2017-09-07 NOTE — CONSULTS
"7/18/2017    RFC: "re consult for AICD"    HPI:  Suman Hayden is a 67 y.o. 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 He is known to have VT aggravated by underlying AT/AFL. The patient had one episode on 7/18/2017 12:03:00, ventricular tachycardia was treated with shock and ATP therapy (received 8 ATPs; device charged 1 time; on charge #1, the patient received a shock of 25 joules). The patient had another episode #2 7/18/2017 12:07:00, ventricular tachycardia treated with shock and ATP therapy (7 ATPs; charged 1 time; on charge #1, the patient received a shock of 20 joules). On interrogation 7/28, he appeared to be AP/BiV paced (though patient is in AF with undersensing). His settings at that time was DDD at 80 bpm. The device was unable to sense P waves and R waves (wer 0.8mV; prior to LVAD, P waves were 0.8mV and R waves were 2.8-3.10mV). Impedance were WNL on all leads, and they were able to pace RV lead in higher output. RV sensing was noted to be inappropriate. ICD tachy detection and therapies were turned to OFF and the device was set to VVI 80 bpm with adjustment of RV output and pulse width. The device was re-interrogated on 7/31 to recheck RV lead post LVAD placement. The RV lead continued to have high capture thresholds. Tachy therapies were already disabled.     Interrogation 9/7/17  He is currently in mode VVI, lower rate 80 BPM, AT/AF monitor >158 BPM (treatment off). VF/VT monitoring off. As for VT/VF, detection if OFF. Aforementioned VT epsiode noted July 18. AS after  noted on Aug 21. RV impedence 17 ohms. SVC impendence 34 ohms, LV impendence 494 ohms, LV threshold 2.75V and pulse width 0.40 ms.      PMH:  Past Medical History:   Diagnosis Date    " Cardiomyopathy     Hyperlipidemia     Hypertension     Obesity     S/P implantation of automatic cardioverter/defibrillator (AICD)     Ventricular tachycardia        PSH:  Past Surgical History:   Procedure Laterality Date    CARDIAC CATHETERIZATION      CARDIAC DEFIBRILLATOR PLACEMENT         Family:  History reviewed. No pertinent family history.    Social:  Social History     Social History    Marital status:      Spouse name: N/A    Number of children: N/A    Years of education: N/A     Occupational History    Not on file.     Social History Main Topics    Smoking status: Never Smoker    Smokeless tobacco: Never Used    Alcohol use No    Drug use: Unknown    Sexual activity: Not on file     Other Topics Concern    Not on file     Social History Narrative    No narrative on file       Medications:  No current facility-administered medications on file prior to encounter.      Current Outpatient Prescriptions on File Prior to Encounter   Medication Sig Dispense Refill    amiodarone (PACERONE) 200 MG Tab Take by mouth once daily.      aspirin 81 MG Chew Take 81 mg by mouth once daily.      DOBUTamine (DOBUTREX) 1,000 mg/250 mL (4,000 mcg/mL) infusion Inject 414 mcg/min into the vein continuous. 500 mL 11    furosemide (LASIX) 40 MG tablet Take 1 tablet (40 mg total) by mouth 2 (two) times daily. 180 tablet 3    potassium chloride SA (K-DUR,KLOR-CON) 20 MEQ tablet Take 20 mEq by mouth once daily.       pravastatin (PRAVACHOL) 20 MG tablet Take 20 mg by mouth every evening.      spironolactone (ALDACTONE) 25 MG tablet Take 1 tablet (25 mg total) by mouth once daily. 90 tablet 3       Allergies:  Review of patient's allergies indicates:  No Known Allergies    Tobacco, alcohol, drugs:  History   Smoking Status    Never Smoker   Smokeless Tobacco    Never Used     History   Alcohol Use No     History   Drug use: Unknown       ROS:  Review of Systems   Constitution: Negative for chills  and fever.   HENT: Negative for ear discharge.    Eyes: Negative for pain and visual disturbance.   Cardiovascular: Negative for chest pain, dyspnea on exertion, irregular heartbeat, leg swelling, orthopnea, palpitations, paroxysmal nocturnal dyspnea and syncope.   Respiratory: Negative for hemoptysis, shortness of breath and wheezing.    Skin: Negative for rash and suspicious lesions.   Musculoskeletal: Negative for joint pain and muscle weakness.   Gastrointestinal: Negative for abdominal pain, diarrhea, hematemesis, hematochezia, melena, nausea and vomiting.   Genitourinary: Negative for dysuria and frequency.   Neurological: Negative for focal weakness, headaches and tremors.   Psychiatric/Behavioral: Negative for altered mental status, suicidal ideas and thoughts of violence.       Vitals:  Temp: 98.3 °F (36.8 °C) (09/07/17 0845)  Pulse: 92 (09/07/17 0845)  Resp: 20 (09/07/17 0845)  BP: 105/69 (09/07/17 0845)  SpO2: 96 % (09/07/17 0845)  Temp:  [97.8 °F (36.6 °C)-98.8 °F (37.1 °C)]   Pulse:  [72-92]   Resp:  [18-20]   BP: ()/(0-69)   SpO2:  [96 %-97 %]     Ins/Outs:    Intake/Output Summary (Last 24 hours) at 09/07/17 0959  Last data filed at 09/07/17 0600   Gross per 24 hour   Intake             1256 ml   Output             1550 ml   Net             -294 ml       PE:  Physical Exam   Constitutional: He is oriented to person, place, and time. He appears well-developed.   HENT:   Head: Normocephalic and atraumatic.   Eyes: EOM are normal.   Neck: Normal range of motion.   Cardiovascular: Exam reveals no gallop and no friction rub.    No murmur heard.  Pulses:       Radial pulses are 2+ on the right side, and 2+ on the left side.   Hum of the VAD   Pulmonary/Chest: Effort normal. He has no wheezes. He has no rales.   Abdominal: Soft. Bowel sounds are normal. He exhibits no distension. There is no tenderness. There is no rebound.   Musculoskeletal: Normal range of motion. He exhibits no edema.    Neurological: He is alert and oriented to person, place, and time. He has normal strength. No cranial nerve deficit or sensory deficit.   Skin: Skin is warm.   Psychiatric: He has a normal mood and affect.        Labs:  CBC with Diff:     Recent Labs  Lab 09/05/17 0337 09/06/17  0409 09/07/17  0506   WBC 8.55 8.04 6.36   HGB 7.1* 7.2* 6.9*   HCT 23.1* 23.2* 22.6*    231 228   LYMPH 16.7*  1.4 15.5*  1.3 17.5*  1.1   MONO 8.5  0.7 10.1  0.8 11.0  0.7   EOSINOPHIL 3.0 2.5 2.7       COAG:    Recent Labs  Lab 09/01/17 0420 09/05/17 0337 09/06/17  0409 09/07/17  0506   APTT 37.5*  --   --   --   --    INR 5.1*  < > 2.7* 2.6* 3.1*   < > = values in this interval not displayed.    CMP:   Recent Labs  Lab 09/01/17 0420 09/04/17 0325 09/05/17 0337 09/06/17  0409 09/07/17  0506   GLU 98  98  < > 75 85 53* 25*   CALCIUM 8.4*  8.4*  < > 8.1* 8.2* 8.2* 8.4*   ALBUMIN 2.2*  --  2.2*  --  2.2*  --    PROT 6.6  --  6.6  --  6.6  --    *  151*  < > 143 143 143 144   K 3.2*  3.2*  < > 4.4 4.5 4.2 4.5   CO2 31*  31*  < > 25 24 25 22*   *  112*  < > 111* 111* 111* 111*   BUN 45*  45*  < > 29* 26* 22 22   CREATININE 1.7*  1.7*  < > 1.2 1.3 1.2 1.3   ALKPHOS 122  --  115  --  115  --    ALT 19  --  15  --  16  --    AST 24  --  20  --  23  --    BILITOT 1.3*  --  1.3*  --  1.2*  --    MG 2.2  < > 1.8 1.6 1.7 1.7   PHOS 3.0  < > 2.4* 2.2* 3.2 3.5   < > = values in this interval not displayed.  Estimated Creatinine Clearance: 53.3 mL/min (based on SCr of 1.3 mg/dL).    .  Recent Labs  Lab 09/06/17  0409   BNP 1,882*         Diagnostic Results:  Ejection Fractions   EF   Date Value Ref Range Status   09/01/2017 10 (A) 55 - 65    08/23/2017 20 (A) 55 - 65    08/21/2017 20 (A) 55 - 65         Assessment and Plan  Suman Hayden is a 67 y.o. male with PMH of HM3 implanted on 7/27/17 s/p delayed chest closure 7/28 and extubated 7/29 (re-intubated 8/9 and extubated 8/13), AF, ESBL bacteremia (continue  ertapenem on ertapenem for 4-6 weeks from 8/11), VT, s/p CRT-D, HTN, HLD, and stage III pressure ulcer of the sacral region. He is known to have VT aggravated by underlying AT/AFL. The patient is still on ertapenem. He is no longer on amiodarone.     AICD present  - per EP note 8/9: will likely switch the RV & LV ports, as he doesn't BiV pacing but only needs to sense VT/VF  - VT and VF monitoring and detection is off currently; recommend pads to chest wall  - will clarify device augmentation and original plan for port switching    AF  - c/w warfarin     Await teaching addendum on 9/7 for recommendations.    Jose R Almonte MD

## 2017-09-07 NOTE — PLAN OF CARE
Problem: Patient Care Overview  Goal: Plan of Care Review  Outcome: Ongoing (interventions implemented as appropriate)  Plan of care reviewed with patient. Patient has no complaints at this time. Patient remains of dobutamine drip. Patient out of bed with physical therapy. Patient wife checked off on dressing change. Patient not checked off on alarms. 2 weeks of lvad dressing supplies ordered. Patient remains free of falls and injury.

## 2017-09-08 LAB
ABO + RH BLD: NORMAL
ALBUMIN SERPL BCP-MCNC: 2.3 G/DL
ALP SERPL-CCNC: 111 U/L
ALT SERPL W/O P-5'-P-CCNC: 14 U/L
ANION GAP SERPL CALC-SCNC: 8 MMOL/L
ANION GAP SERPL CALC-SCNC: 9 MMOL/L
APTT BLDCRRT: 37.9 SEC
AST SERPL-CCNC: 19 U/L
BASOPHILS # BLD AUTO: 0.06 K/UL
BASOPHILS # BLD AUTO: 0.06 K/UL
BASOPHILS NFR BLD: 0.8 %
BASOPHILS NFR BLD: 0.8 %
BILIRUB DIRECT SERPL-MCNC: 0.8 MG/DL
BILIRUB SERPL-MCNC: 1.1 MG/DL
BLD GP AB SCN CELLS X3 SERPL QL: NORMAL
BNP SERPL-MCNC: 1808 PG/ML
BUN SERPL-MCNC: 20 MG/DL
BUN SERPL-MCNC: 20 MG/DL
CALCIUM SERPL-MCNC: 8.4 MG/DL
CALCIUM SERPL-MCNC: 8.4 MG/DL
CHLORIDE SERPL-SCNC: 110 MMOL/L
CHLORIDE SERPL-SCNC: 112 MMOL/L
CO2 SERPL-SCNC: 23 MMOL/L
CO2 SERPL-SCNC: 25 MMOL/L
CREAT SERPL-MCNC: 1.5 MG/DL
CREAT SERPL-MCNC: 1.5 MG/DL
CRP SERPL-MCNC: 34.8 MG/L
DIASTOLIC DYSFUNCTION: YES
DIFFERENTIAL METHOD: ABNORMAL
DIFFERENTIAL METHOD: ABNORMAL
EOSINOPHIL # BLD AUTO: 0.2 K/UL
EOSINOPHIL # BLD AUTO: 0.2 K/UL
EOSINOPHIL NFR BLD: 2.5 %
EOSINOPHIL NFR BLD: 3.1 %
ERYTHROCYTE [DISTWIDTH] IN BLOOD BY AUTOMATED COUNT: 17.6 %
ERYTHROCYTE [DISTWIDTH] IN BLOOD BY AUTOMATED COUNT: 17.7 %
EST. GFR  (AFRICAN AMERICAN): 54.9 ML/MIN/1.73 M^2
EST. GFR  (AFRICAN AMERICAN): 54.9 ML/MIN/1.73 M^2
EST. GFR  (NON AFRICAN AMERICAN): 47.5 ML/MIN/1.73 M^2
EST. GFR  (NON AFRICAN AMERICAN): 47.5 ML/MIN/1.73 M^2
ESTIMATED PA SYSTOLIC PRESSURE: 45.25
GLUCOSE SERPL-MCNC: 61 MG/DL
GLUCOSE SERPL-MCNC: 75 MG/DL
HCT VFR BLD AUTO: 22.7 %
HCT VFR BLD AUTO: 22.7 %
HGB BLD-MCNC: 7.1 G/DL
HGB BLD-MCNC: 7.2 G/DL
INR PPP: 3.6
INR PPP: 4.2
LDH SERPL L TO P-CCNC: 273 U/L
LYMPHOCYTES # BLD AUTO: 1.1 K/UL
LYMPHOCYTES # BLD AUTO: 1.2 K/UL
LYMPHOCYTES NFR BLD: 15.1 %
LYMPHOCYTES NFR BLD: 16.7 %
MAGNESIUM SERPL-MCNC: 1.8 MG/DL
MCH RBC QN AUTO: 27.2 PG
MCH RBC QN AUTO: 27.7 PG
MCHC RBC AUTO-ENTMCNC: 31.3 G/DL
MCHC RBC AUTO-ENTMCNC: 31.7 G/DL
MCV RBC AUTO: 87 FL
MCV RBC AUTO: 87 FL
MONOCYTES # BLD AUTO: 0.7 K/UL
MONOCYTES # BLD AUTO: 0.9 K/UL
MONOCYTES NFR BLD: 10.2 %
MONOCYTES NFR BLD: 12.2 %
NEUTROPHILS # BLD AUTO: 4.9 K/UL
NEUTROPHILS # BLD AUTO: 5 K/UL
NEUTROPHILS NFR BLD: 68.7 %
NEUTROPHILS NFR BLD: 69.5 %
PHOSPHATE SERPL-MCNC: 3.5 MG/DL
PLATELET # BLD AUTO: 224 K/UL
PLATELET # BLD AUTO: 237 K/UL
PMV BLD AUTO: 10.4 FL
PMV BLD AUTO: 11.4 FL
POTASSIUM SERPL-SCNC: 4.4 MMOL/L
POTASSIUM SERPL-SCNC: 4.9 MMOL/L
PREALB SERPL-MCNC: 11 MG/DL
PROT SERPL-MCNC: 6.7 G/DL
PROTHROMBIN TIME: 35.9 SEC
PROTHROMBIN TIME: 43.1 SEC
RBC # BLD AUTO: 2.6 M/UL
RBC # BLD AUTO: 2.61 M/UL
RETIRED EF AND QEF - SEE NOTES: 10 (ref 55–65)
SODIUM SERPL-SCNC: 143 MMOL/L
SODIUM SERPL-SCNC: 144 MMOL/L
TRICUSPID VALVE REGURGITATION: ABNORMAL
WBC # BLD AUTO: 7.15 K/UL
WBC # BLD AUTO: 7.25 K/UL

## 2017-09-08 PROCEDURE — 20600001 HC STEP DOWN PRIVATE ROOM

## 2017-09-08 PROCEDURE — 80048 BASIC METABOLIC PNL TOTAL CA: CPT | Mod: 91

## 2017-09-08 PROCEDURE — 85730 THROMBOPLASTIN TIME PARTIAL: CPT

## 2017-09-08 PROCEDURE — A4216 STERILE WATER/SALINE, 10 ML: HCPCS | Performed by: THORACIC SURGERY (CARDIOTHORACIC VASCULAR SURGERY)

## 2017-09-08 PROCEDURE — 25000003 PHARM REV CODE 250: Performed by: THORACIC SURGERY (CARDIOTHORACIC VASCULAR SURGERY)

## 2017-09-08 PROCEDURE — 85610 PROTHROMBIN TIME: CPT | Mod: 91

## 2017-09-08 PROCEDURE — 93306 TTE W/DOPPLER COMPLETE: CPT

## 2017-09-08 PROCEDURE — 97150 GROUP THERAPEUTIC PROCEDURES: CPT

## 2017-09-08 PROCEDURE — 63600175 PHARM REV CODE 636 W HCPCS: Performed by: NURSE PRACTITIONER

## 2017-09-08 PROCEDURE — 80076 HEPATIC FUNCTION PANEL: CPT

## 2017-09-08 PROCEDURE — 83880 ASSAY OF NATRIURETIC PEPTIDE: CPT

## 2017-09-08 PROCEDURE — 97535 SELF CARE MNGMENT TRAINING: CPT

## 2017-09-08 PROCEDURE — 83735 ASSAY OF MAGNESIUM: CPT

## 2017-09-08 PROCEDURE — 63600175 PHARM REV CODE 636 W HCPCS: Performed by: THORACIC SURGERY (CARDIOTHORACIC VASCULAR SURGERY)

## 2017-09-08 PROCEDURE — 85610 PROTHROMBIN TIME: CPT

## 2017-09-08 PROCEDURE — 36415 COLL VENOUS BLD VENIPUNCTURE: CPT

## 2017-09-08 PROCEDURE — 63600175 PHARM REV CODE 636 W HCPCS: Performed by: PHYSICIAN ASSISTANT

## 2017-09-08 PROCEDURE — 63600175 PHARM REV CODE 636 W HCPCS: Performed by: STUDENT IN AN ORGANIZED HEALTH CARE EDUCATION/TRAINING PROGRAM

## 2017-09-08 PROCEDURE — 85025 COMPLETE CBC W/AUTO DIFF WBC: CPT | Mod: 91

## 2017-09-08 PROCEDURE — 93306 TTE W/DOPPLER COMPLETE: CPT | Mod: 26,,, | Performed by: INTERNAL MEDICINE

## 2017-09-08 PROCEDURE — 97803 MED NUTRITION INDIV SUBSEQ: CPT

## 2017-09-08 PROCEDURE — 83615 LACTATE (LD) (LDH) ENZYME: CPT

## 2017-09-08 PROCEDURE — 86900 BLOOD TYPING SEROLOGIC ABO: CPT

## 2017-09-08 PROCEDURE — 99232 SBSQ HOSP IP/OBS MODERATE 35: CPT | Mod: ,,, | Performed by: INTERNAL MEDICINE

## 2017-09-08 PROCEDURE — 97530 THERAPEUTIC ACTIVITIES: CPT

## 2017-09-08 PROCEDURE — 84100 ASSAY OF PHOSPHORUS: CPT

## 2017-09-08 PROCEDURE — 25000003 PHARM REV CODE 250: Performed by: NURSE PRACTITIONER

## 2017-09-08 PROCEDURE — 80048 BASIC METABOLIC PNL TOTAL CA: CPT

## 2017-09-08 PROCEDURE — 27000248 HC VAD-ADDITIONAL DAY

## 2017-09-08 PROCEDURE — 99231 SBSQ HOSP IP/OBS SF/LOW 25: CPT | Mod: GC,,, | Performed by: INTERNAL MEDICINE

## 2017-09-08 PROCEDURE — 86140 C-REACTIVE PROTEIN: CPT

## 2017-09-08 PROCEDURE — 86850 RBC ANTIBODY SCREEN: CPT

## 2017-09-08 PROCEDURE — 84134 ASSAY OF PREALBUMIN: CPT

## 2017-09-08 PROCEDURE — 97116 GAIT TRAINING THERAPY: CPT

## 2017-09-08 RX ORDER — DRONABINOL 2.5 MG/1
5 CAPSULE ORAL 2 TIMES DAILY
Status: DISCONTINUED | OUTPATIENT
Start: 2017-09-08 | End: 2017-09-22 | Stop reason: HOSPADM

## 2017-09-08 RX ADMIN — POTASSIUM CHLORIDE 40 MEQ: 1500 TABLET, EXTENDED RELEASE ORAL at 09:09

## 2017-09-08 RX ADMIN — DRONABINOL 5 MG: 2.5 CAPSULE ORAL at 09:09

## 2017-09-08 RX ADMIN — HYDRALAZINE HYDROCHLORIDE 50 MG: 50 TABLET ORAL at 09:09

## 2017-09-08 RX ADMIN — Medication 10 ML: at 12:09

## 2017-09-08 RX ADMIN — PRAVASTATIN SODIUM 20 MG: 20 TABLET ORAL at 09:09

## 2017-09-08 RX ADMIN — MAGNESIUM OXIDE TAB 400 MG (241.3 MG ELEMENTAL MG) 800 MG: 400 (241.3 MG) TAB at 02:09

## 2017-09-08 RX ADMIN — POTASSIUM CHLORIDE 40 MEQ: 1500 TABLET, EXTENDED RELEASE ORAL at 08:09

## 2017-09-08 RX ADMIN — MAGNESIUM OXIDE TAB 400 MG (241.3 MG ELEMENTAL MG) 800 MG: 400 (241.3 MG) TAB at 06:09

## 2017-09-08 RX ADMIN — PANTOPRAZOLE SODIUM 40 MG: 40 TABLET, DELAYED RELEASE ORAL at 08:09

## 2017-09-08 RX ADMIN — DOBUTAMINE IN DEXTROSE 5 MCG/KG/MIN: 400 INJECTION, SOLUTION INTRAVENOUS at 09:09

## 2017-09-08 RX ADMIN — ERTAPENEM SODIUM 1 G: 1 INJECTION, POWDER, LYOPHILIZED, FOR SOLUTION INTRAMUSCULAR; INTRAVENOUS at 09:09

## 2017-09-08 RX ADMIN — HYDRALAZINE HYDROCHLORIDE 50 MG: 50 TABLET ORAL at 06:09

## 2017-09-08 RX ADMIN — MAGNESIUM OXIDE TAB 400 MG (241.3 MG ELEMENTAL MG) 800 MG: 400 (241.3 MG) TAB at 09:09

## 2017-09-08 RX ADMIN — FUROSEMIDE 40 MG: 10 INJECTION, SOLUTION INTRAVENOUS at 08:09

## 2017-09-08 RX ADMIN — AMLODIPINE BESYLATE 10 MG: 10 TABLET ORAL at 08:09

## 2017-09-08 RX ADMIN — HYDRALAZINE HYDROCHLORIDE 50 MG: 50 TABLET ORAL at 02:09

## 2017-09-08 RX ADMIN — ONDANSETRON 4 MG: 2 INJECTION INTRAMUSCULAR; INTRAVENOUS at 09:09

## 2017-09-08 RX ADMIN — LIDOCAINE 1 PATCH: 50 PATCH TOPICAL at 12:09

## 2017-09-08 RX ADMIN — Medication 10 ML: at 06:09

## 2017-09-08 NOTE — ASSESSMENT & PLAN NOTE
-Patient is pulsatile  -MAP goal 60-80 mmHg  -Blood pressure uncontrolled over the last 24 hours  -Antihypertensive medications include Amlodipine and Hydralazine  Will monitor trends

## 2017-09-08 NOTE — PROGRESS NOTES
Seen pt in hospital rm 312. Reeducated pt and caregiver on the proper way to wear go gear accessories. Caregiver and pt verbalized understanding.

## 2017-09-08 NOTE — PLAN OF CARE
Problem: Patient Care Overview  Goal: Plan of Care Review  Outcome: Ongoing (interventions implemented as appropriate)  Hibiclens bath completed x2 in preparation for AICD lead revision in the morning. Pt has been NPO since MN.  gtt continues to infuse at MD ordered rate. IV ABX therapy continued per MD order. Fall precautions reviewed with Pt, Pt remained free from falls/trauma/injury. VSS, LVAD hum smooth. VAD numbers WDL, no alarms noted over night. Denies any CP, SOB, palpitations, dizziness, pain and discomfort. Turning/repositioning independently in bed. Plan of care reviewed with patient and all questions answered, verbalizes understanding. No acute distress noted. Will continue to monitor.

## 2017-09-08 NOTE — PT/OT/SLP PROGRESS
Physical Therapy  Treatment    Suman Hayden   MRN: [id number]   Admitting Diagnosis: LVAD (left ventricular assist device) present    PT Received On: 09/08/17  PT Start Time: 1055     PT Stop Time: 1140    PT Total Time (min): 45 min       Billable Minutes:  Gait Training8 min and Therapeutic Activity Group 36 min    Treatment Type: Treatment  PT/PTA: PT     PTA Visit Number: 0       General Precautions: Standard, fall, LVAD  Orthopedic Precautions: N/A   Braces: N/A    Do you have any cultural, spiritual, Protestant conflicts, given your current situation?: none noted     Subjective:  Communicated with RN prior to session. Pt agreeable to participate in therapy session. Pt's wife present throughout session. Therapy, therapy tech and RN escorted pt outside for group therapy session and gait training.     Pain/Comfort  Pain Rating 1: 0/10  Pain Rating Post-Intervention 1: 0/10    Objective:   Patient found with: telemetry, PICC line (LVAD to battery power)    Functional Mobility:  Bed Mobility:    N/T 2/2 pt sitting up in chair upon PT arrival.     Transfers:  Sit <> Stand Assistance: Minimum Assistance (from wheelchair x 2 trials )  Sit <> Stand Assistive Device: Rolling Walker    Gait:   Gait Distance: 25 ft. with RW; CGA for safety and RW management   Assistance 1: Contact Guard Assistance (chair follow for safety)  Gait Assistive Device: Rolling walker  Gait Pattern: reciprocal  Gait Deviation(s): decreased hal, decreased step length, decreased toe-to-floor clearance, backward lean    Balance:   Static Stand: FAIR: Maintains without assist but unable to take challenges  Dynamic stand: POOR    Therapeutic Activities and Exercises:  Therapist facilitated progression of gait training performed outside with RW to improve gait stability, endurance, and independence with functional ambulation. Gait trial performed on altered surface to challenge dynamic standing balance and reduce fall risk. 2 trials of sit<>stand also  performed to improve pt's safety with RW management and dynamic stability.      Pt participated in group therapy activity while outside including the following seated therex for BLE strengthening and endurance: x 15 reps. RN present for supervision.   - ankle pumps, heel raises   - LAQ   - marches   - UE forward punches   - shoulder flexion   - elbow flex/ext   - wrist flex/ext     AM-PAC 6 CLICK MOBILITY  How much help from another person does this patient currently need?   1 = Unable, Total/Dependent Assistance  2 = A lot, Maximum/Moderate Assistance  3 = A little, Minimum/Contact Guard/Supervision  4 = None, Modified Dunn/Independent    Turning over in bed (including adjusting bedclothes, sheets and blankets)?: 3  Sitting down on and standing up from a chair with arms (e.g., wheelchair, bedside commode, etc.): 3  Moving from lying on back to sitting on the side of the bed?: 3  Moving to and from a bed to a chair (including a wheelchair)?: 3  Need to walk in hospital room?: 3  Climbing 3-5 steps with a railing?: 2  Total Score: 17    AM-PAC Raw Score CMS G-Code Modifier Level of Impairment Assistance   6 % Total / Unable   7 - 9 CM 80 - 100% Maximal Assist   10 - 14 CL 60 - 80% Moderate Assist   15 - 19 CK 40 - 60% Moderate Assist   20 - 22 CJ 20 - 40% Minimal Assist   23 CI 1-20% SBA / CGA   24 CH 0% Independent/ Mod I     Patient left up in chair with all lines intact, call button in reach and RN notified and wife present.     Assessment:  Suman Hayden is a 67 y.o. male with a medical diagnosis of LVAD (left ventricular assist device) present. Pt demonstrated improvement in performance of sit<>stand transfers with RW this visit. Pt continues to require increased (A) for gait due to impaired balance and deconditioning. Pt would benefit from continued PT intervention to address below listed deficits and maximize return to PLOF.     Rehab identified problem list/impairments: Rehab identified problem  list/impairments: weakness, impaired endurance, impaired self care skills, impaired functional mobilty, gait instability, impaired balance, decreased safety awareness, impaired cardiopulmonary response to activity    Rehab potential is good.    Activity tolerance: Good    Discharge recommendations: Discharge Facility/Level Of Care Needs: rehabilitation facility     Barriers to discharge: Barriers to Discharge: None    Equipment recommendations: Equipment Needed After Discharge: walker, rolling     GOALS:    Physical Therapy Goals        Problem: Physical Therapy Goal    Goal Priority Disciplines Outcome Goal Variances Interventions   Physical Therapy Goal     PT/OT, PT Ongoing (interventions implemented as appropriate)     Description:  Goals to be met by: 17     Patient will increase functional independence with mobility by performin. Supine to sit with MInimal Assistance-  Met 2017  2. Sit to supine with MInimal Assistance - not met  3. Sit to stand transfer with Minimal Assistance- met       Updated: Sit to stand transfer with supervision using RW   4. Bed to chair transfer with Minimal Assistance- not met  5. Gait  x 50 feet with Minimal Assistance. -  Met 2017  6. Lower extremity exercise program x15 reps, with supervision, in order to increase LE strength and (I) with functional mobility. - not met  7.Pt mod I supine to sit- not met  8. Pt receive gait training ~ 220 ft with AD if needed and supervision- not met                           PLAN:    Patient to be seen 6 x/week  to address the above listed problems via gait training, therapeutic activities, therapeutic exercises, neuromuscular re-education  Plan of Care expires: 17  Plan of Care reviewed with: patient, spouse        Tonja Avel, PT, DPT   2017  Pager: [phone]

## 2017-09-08 NOTE — PLAN OF CARE
Problem: Patient Care Overview  Goal: Plan of Care Review  Outcome: Ongoing (interventions implemented as appropriate)  Plan of care reviewed with patient. Patient has no complaints at this time. Patient remains of dobutamine drip. Patient out of bed with physical therapy. Patient wife checked off on dressing change. Patient not checked off on alarms.  Patient remains free of falls and injury.

## 2017-09-08 NOTE — PLAN OF CARE
Problem: Physical Therapy Goal  Goal: Physical Therapy Goal  Goals to be met by: 17     Patient will increase functional independence with mobility by performin. Supine to sit with MInimal Assistance-  Met 2017  2. Sit to supine with MInimal Assistance - not met  3. Sit to stand transfer with Minimal Assistance- met       Updated: Sit to stand transfer with supervision using RW   4. Bed to chair transfer with Minimal Assistance- not met  5. Gait  x 50 feet with Minimal Assistance. -  Met 2017  6. Lower extremity exercise program x15 reps, with supervision, in order to increase LE strength and (I) with functional mobility. - not met  7.Pt mod I supine to sit- not met  8. Pt receive gait training ~ 220 ft with AD if needed and supervision- not met         Outcome: Ongoing (interventions implemented as appropriate)  Pt progressing towards therapy goals; continue current POC.     Tonja Vallecillo, PT, DPT   2017  Pager: [phone]

## 2017-09-08 NOTE — ASSESSMENT & PLAN NOTE
-HeartMate 3 Implanted 7/27/2017 as DT   -s/p delayed chest closure (7/28/17) and extubated (7/29/17), reintubated 8/9/17 and extubated 8/13/17  -CTS Primary  -Implanted by Dr. Downing  -Continue Coumadin, Goal INR 2.0-3.0. Hold coumadin tonight   -Antiplatelets : n/a  -LDH is stable overall today. Will continue to monitor daily.  -Speed set at 5200 rpm  -Interrogation notable for no events  -Not listed for OHTx

## 2017-09-08 NOTE — PROGRESS NOTES
[facility]  Heart Transplant  Progress Note    Patient Name: Suman Hayden  MRN: [id number]  Admission Date: 7/18/2017  Hospital Length of Stay: 52 days  Attending Physician: Sunny Downing MD  Primary Care Provider: Joe Ernst MD  Principal Problem:LVAD (left ventricular assist device) present    Subjective:     Interval History: patient feeling well, working with PT/OT    Continuous Infusions:   DOBUTamine 5 mcg/kg/min (09/06/17 2341)     Scheduled Meds:   amlodipine  10 mg Oral Daily    ertapenem (INVANZ) IVPB  1 g Intravenous Q24H    furosemide  40 mg Intravenous BID    hydrALAZINE  50 mg Oral Q8H    lidocaine  1 patch Transdermal Q24H    magnesium oxide  800 mg Oral TID    pantoprazole  40 mg Oral Daily    potassium chloride SA  40 mEq Oral BID    pravastatin  20 mg Oral QHS    sodium chloride 0.9%  10 mL Intravenous Q6H     PRN Meds:albuterol sulfate, bisacodyl, ceFAZolin (ANCEF) IVPB, dextrose 50%, glucagon (human recombinant), hydrALAZINE, hydrocodone-acetaminophen 5-325mg, insulin aspart, ondansetron, Flushing PICC Protocol **AND** sodium chloride 0.9% **AND** sodium chloride 0.9%    Review of patient's allergies indicates:  No Known Allergies  Objective:     Vital Signs (Most Recent):  Temp: 98.7 °F (37.1 °C) (09/08/17 0752)  Pulse: 71 (09/08/17 0752)  Resp: 18 (09/08/17 0752)  BP: (!) 82/0 (09/08/17 0759)  SpO2: 97 % (09/08/17 0752) Vital Signs (24h Range):  Temp:  [98.3 °F (36.8 °C)-99 °F (37.2 °C)] 98.7 °F (37.1 °C)  Pulse:  [] 71  Resp:  [16-20] 18  SpO2:  [96 %-99 %] 97 %  BP: ()/(0-69) 82/0     Weight: 75.7 kg (166 lb 14.2 oz)  Body mass index is 25.38 kg/m².      Intake/Output Summary (Last 24 hours) at 09/08/17 0840  Last data filed at 09/08/17 0400   Gross per 24 hour   Intake            851.9 ml   Output             1250 ml   Net           -398.1 ml       Hemodynamic Parameters:       Physical Exam   Constitutional: He is oriented to person, place, and  time. He appears well-developed and well-nourished.   HENT:   Head: Normocephalic and atraumatic.   Eyes: EOM are normal. Pupils are equal, round, and reactive to light.   Neck: Normal range of motion. Neck supple. JVD present.   Cardiovascular: Normal rate and regular rhythm.    No murmur heard.  VAD hum smooth   Pulmonary/Chest: Effort normal and breath sounds normal.   Abdominal: Soft. Bowel sounds are normal. He exhibits no distension. There is no tenderness. There is no guarding.   Musculoskeletal: Normal range of motion. He exhibits no edema.   Neurological: He is alert and oriented to person, place, and time.   Skin: Skin is warm and dry.   Nursing note and vitals reviewed.      Significant Labs:  CBC:    Recent Labs  Lab 09/08/17 0527   WBC 7.15   RBC 2.60*   HGB 7.2*   HCT 22.7*      MCV 87   MCH 27.7   MCHC 31.7*     BNP:    Recent Labs  Lab 09/08/17 0527   BNP 1,808*     CMP:    Recent Labs  Lab 09/08/17 0527   GLU 75   CALCIUM 8.4*   ALBUMIN 2.3*   PROT 6.7      K 4.9   CO2 23   *   BUN 20   CREATININE 1.5*   ALKPHOS 111   ALT 14   AST 19   BILITOT 1.1*      Coagulation:     Recent Labs  Lab 09/08/17  0020 09/08/17 0527   INR 3.6* 4.2*   APTT 37.9*  --      LDH:    Recent Labs  Lab 09/06/17  0409 09/07/17  0506 09/08/17 0527   * 270* 273*     Microbiology:  Microbiology Results (last 7 days)     Procedure Component Value Units Date/Time    Blood culture [[id number]] Collected:  08/29/17 0836    Order Status:  Completed Specimen:  Blood from Line, Jugular, Internal Right Updated:  09/03/17 1022     Blood Culture, Routine No growth after 5 days.    Narrative:       FromTrialysis,prior to removing line.          I have reviewed all pertinent labs within the past 24 hours.    Estimated Creatinine Clearance: 46.2 mL/min (based on SCr of 1.5 mg/dL (H)).    Diagnostic Results:  I have reviewed and interpreted all pertinent imaging results/findings within the past 24  hours.    Assessment and Plan:     * LVAD (left ventricular assist device) present    -HeartMate 3 Implanted 7/27/2017 as DT   -s/p delayed chest closure (7/28/17) and extubated (7/29/17), reintubated 8/9/17 and extubated 8/13/17  -CTS Primary  -Implanted by Dr. Downing  -Continue Coumadin, Goal INR 2.0-3.0. Hold coumadin tonight   -Antiplatelets : n/a  -LDH is stable overall today. Will continue to monitor daily.  -Speed set at 5200 rpm  -Interrogation notable for no events  -Not listed for OHTx           Stage III pressure ulcer of sacral region    - wound care        Bacteremia    - ESBL bacteremia.Cont ertapenem. Per ID- continue ertapenem x 4-6 weeks from 8/11. If discharged on ertapenem needs weekly cbc and cmp while on antibiotics (please fax results to ID clinic)  - blood cultures from 8/29 NGTD          Atrial fibrillation    -AC, Amio per C TS          Hyperglycemia    -per primary team        Atrial tachycardia    - [id number] - 3  -Rec restarting Amio. AC per CTS        AICD discharge    - Appropriate in the setting of VT aggravated by underlying AT/AFL (earlier during the admission). Device reprogrammed to VVI 80 this admission          Hepatitis B core antibody positive since 2012              V-tach    - Recommend resuming Amio, NSVT on tele        Hyperlipidemia    -continue pravastatin        Essential hypertension    -Patient is pulsatile  -MAP goal 60-80 mmHg  -Blood pressure uncontrolled over the last 24 hours  -Antihypertensive medications include Amlodipine and Hydralazine  Will monitor trends              CHF (NYHA class IV, ACC/AHA stage D)    -NICM  -Last 2D Echo 9/1/2017: LVEF 10-15%, LVEDD 6.3 cm  -Current diuretic regimen: Furosemide IVP. Net even over the last 24 hours. Will I/Os not accurate. Diuretic regimen per primary.  -GDMT with n/a  -2g Na dietary restriction, 1500 mL fluid restriction, strict I/Os              MELISSA Long  Heart Transplant  [facility]  [place]

## 2017-09-08 NOTE — SUBJECTIVE & OBJECTIVE
Interval History: patient feeling well, working with PT/OT    Continuous Infusions:   DOBUTamine 5 mcg/kg/min (09/06/17 2341)     Scheduled Meds:   amlodipine  10 mg Oral Daily    ertapenem (INVANZ) IVPB  1 g Intravenous Q24H    furosemide  40 mg Intravenous BID    hydrALAZINE  50 mg Oral Q8H    lidocaine  1 patch Transdermal Q24H    magnesium oxide  800 mg Oral TID    pantoprazole  40 mg Oral Daily    potassium chloride SA  40 mEq Oral BID    pravastatin  20 mg Oral QHS    sodium chloride 0.9%  10 mL Intravenous Q6H     PRN Meds:albuterol sulfate, bisacodyl, ceFAZolin (ANCEF) IVPB, dextrose 50%, glucagon (human recombinant), hydrALAZINE, hydrocodone-acetaminophen 5-325mg, insulin aspart, ondansetron, Flushing PICC Protocol **AND** sodium chloride 0.9% **AND** sodium chloride 0.9%    Review of patient's allergies indicates:  No Known Allergies  Objective:     Vital Signs (Most Recent):  Temp: 98.7 °F (37.1 °C) (09/08/17 0752)  Pulse: 71 (09/08/17 0752)  Resp: 18 (09/08/17 0752)  BP: (!) 82/0 (09/08/17 0759)  SpO2: 97 % (09/08/17 0752) Vital Signs (24h Range):  Temp:  [98.3 °F (36.8 °C)-99 °F (37.2 °C)] 98.7 °F (37.1 °C)  Pulse:  [] 71  Resp:  [16-20] 18  SpO2:  [96 %-99 %] 97 %  BP: ()/(0-69) 82/0     Weight: 75.7 kg (166 lb 14.2 oz)  Body mass index is 25.38 kg/m².      Intake/Output Summary (Last 24 hours) at 09/08/17 0840  Last data filed at 09/08/17 0400   Gross per 24 hour   Intake            851.9 ml   Output             1250 ml   Net           -398.1 ml       Hemodynamic Parameters:       Physical Exam   Constitutional: He is oriented to person, place, and time. He appears well-developed and well-nourished.   HENT:   Head: Normocephalic and atraumatic.   Eyes: EOM are normal. Pupils are equal, round, and reactive to light.   Neck: Normal range of motion. Neck supple. JVD present.   Cardiovascular: Normal rate and regular rhythm.    No murmur heard.  VAD hum smooth   Pulmonary/Chest:  Effort normal and breath sounds normal.   Abdominal: Soft. Bowel sounds are normal. He exhibits no distension. There is no tenderness. There is no guarding.   Musculoskeletal: Normal range of motion. He exhibits no edema.   Neurological: He is alert and oriented to person, place, and time.   Skin: Skin is warm and dry.   Nursing note and vitals reviewed.      Significant Labs:  CBC:    Recent Labs  Lab 09/08/17 0527   WBC 7.15   RBC 2.60*   HGB 7.2*   HCT 22.7*      MCV 87   MCH 27.7   MCHC 31.7*     BNP:    Recent Labs  Lab 09/08/17 0527   BNP 1,808*     CMP:    Recent Labs  Lab 09/08/17 0527   GLU 75   CALCIUM 8.4*   ALBUMIN 2.3*   PROT 6.7      K 4.9   CO2 23   *   BUN 20   CREATININE 1.5*   ALKPHOS 111   ALT 14   AST 19   BILITOT 1.1*      Coagulation:     Recent Labs  Lab 09/08/17  0020 09/08/17 0527   INR 3.6* 4.2*   APTT 37.9*  --      LDH:    Recent Labs  Lab 09/06/17  0409 09/07/17  0506 09/08/17 0527   * 270* 273*     Microbiology:  Microbiology Results (last 7 days)     Procedure Component Value Units Date/Time    Blood culture [[id number]] Collected:  08/29/17 0836    Order Status:  Completed Specimen:  Blood from Line, Jugular, Internal Right Updated:  09/03/17 1022     Blood Culture, Routine No growth after 5 days.    Narrative:       FromTrialysis,prior to removing line.          I have reviewed all pertinent labs within the past 24 hours.    Estimated Creatinine Clearance: 46.2 mL/min (based on SCr of 1.5 mg/dL (H)).    Diagnostic Results:  I have reviewed and interpreted all pertinent imaging results/findings within the past 24 hours.

## 2017-09-08 NOTE — PROGRESS NOTES
LOS: 52 days     24h events and S:  Suman Hayden is a 67 y.o. male who received Hibiclens bath completed x2 in preparation for AICD lead revision in the morning. Pt has been NPO since MN.  gtt infusing. IV abx therapy continued.      O:  Vitals:  Temp: 98.7 °F (37.1 °C) (09/08/17 0752)  Pulse: 80 (09/08/17 1000)  Resp: 18 (09/08/17 0752)  BP: (!) 82/0 (09/08/17 0759)  SpO2: 97 % (09/08/17 0752)    Ins/Outs:    Intake/Output Summary (Last 24 hours) at 09/08/17 1205  Last data filed at 09/08/17 0400   Gross per 24 hour   Intake            491.9 ml   Output             1050 ml   Net           -558.1 ml       Physical Exam:  General: alert and oriented, conversant  Heart: sound of the VAD  Lungs: CTAB  Abdomen: soft, NT, ND, +BS  Vascular: 2+ radial pulse, no edema    Medications:   amlodipine  10 mg Oral Daily    dronabinol  5 mg Oral BID    ertapenem (INVANZ) IVPB  1 g Intravenous Q24H    hydrALAZINE  50 mg Oral Q8H    lidocaine  1 patch Transdermal Q24H    magnesium oxide  800 mg Oral TID    pantoprazole  40 mg Oral Daily    potassium chloride SA  40 mEq Oral BID    pravastatin  20 mg Oral QHS    sodium chloride 0.9%  10 mL Intravenous Q6H      DOBUTamine 5 mcg/kg/min (09/06/17 2341)     albuterol sulfate, bisacodyl, ceFAZolin (ANCEF) IVPB, dextrose 50%, glucagon (human recombinant), hydrALAZINE, hydrocodone-acetaminophen 5-325mg, insulin aspart, ondansetron, Flushing PICC Protocol **AND** sodium chloride 0.9% **AND** sodium chloride 0.9%    Review of patient's allergies indicates:  No Known Allergies    Labs:  CBC with Diff:     Recent Labs  Lab 09/07/17  0506 09/08/17  0020 09/08/17  0527   WBC 6.36 7.25 7.15   HGB 6.9* 7.1* 7.2*   HCT 22.6* 22.7* 22.7*    224 237   LYMPH 17.5*  1.1 16.7*  1.2 15.1*  1.1   MONO 11.0  0.7 10.2  0.7 12.2  0.9   EOSINOPHIL 2.7 2.5 3.1       COAG:    Recent Labs  Lab 09/07/17  0506 09/08/17  0020 09/08/17  0527   APTT  --  37.9*  --    INR 3.1* 3.6* 4.2*        CMP:   Recent Labs  Lab 09/04/17  0325  09/06/17  0409 09/07/17  0506 09/08/17  0020 09/08/17  0527   GLU 75  < > 53* 25* 61* 75   CALCIUM 8.1*  < > 8.2* 8.4* 8.4* 8.4*   ALBUMIN 2.2*  --  2.2*  --   --  2.3*   PROT 6.6  --  6.6  --   --  6.7     < > 143 144 143 144   K 4.4  < > 4.2 4.5 4.4 4.9   CO2 25  < > 25 22* 25 23   *  < > 111* 111* 110 112*   BUN 29*  < > 22 22 20 20   CREATININE 1.2  < > 1.2 1.3 1.5* 1.5*   ALKPHOS 115  --  115  --   --  111   ALT 15  --  16  --   --  14   AST 20  --  23  --   --  19   BILITOT 1.3*  --  1.2*  --   --  1.1*   MG 1.8  < > 1.7 1.7  --  1.8   PHOS 2.4*  < > 3.2 3.5  --  3.5   < > = values in this interval not displayed.  Estimated Creatinine Clearance: 46.2 mL/min (based on SCr of 1.5 mg/dL (H)).    .  Recent Labs  Lab 09/08/17  0527   BNP 1,808*         Diagnostic Results:  Ejection Fractions   EF   Date Value Ref Range Status   09/08/2017 10 (A) 55 - 65    09/01/2017 10 (A) 55 - 65    08/23/2017 20 (A) 55 - 65         Infectious disease labs:  Microbiology Results (last 7 days)     Procedure Component Value Units Date/Time    Blood culture [[id number]] Collected:  08/29/17 0836    Order Status:  Completed Specimen:  Blood from Line, Jugular, Internal Right Updated:  09/03/17 1022     Blood Culture, Routine No growth after 5 days.    Narrative:       FromTrialysis,prior to removing line.          Assessment and Plan:  Suman Hayden is a 67 y.o. 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 He is known to have VT aggravated by underlying AT/AFL. The patient is still on ertapenem. He is no longer on amiodarone.      AICD present  - plan to switch LV lead and RV pace sense pin within the header  - might add pace sense lead but  only if the aforementioned plan doesn't work  - revision likely next week     AF  - c/w warfarin      Jose R Almonte MD

## 2017-09-08 NOTE — PROGRESS NOTES
Daily E and M and VAD Interrogation Note    Reason for Visit: HM III implant   Patient is seen in follow up for management            [x] Other - HM III     Interval History:  [] Interval history unobtainable due to intubation.  The [x] implant/[] explant date was 7/27/17     Events -  NAEON.  Eating more solid food and Boost.      Review of Systems:   Negative except as above.     Medications:  Current Facility-Administered Medications   Medication Dose Route Frequency Provider Last Rate Last Dose    albuterol nebulizer solution 2.5 mg  2.5 mg Nebulization Q4H PRN Shayne Espinoza MD        amlodipine tablet 10 mg  10 mg Oral Daily Geovanna Marques NP   10 mg at 09/08/17 0800    bisacodyl suppository 10 mg  10 mg Rectal Daily PRN Shayne Espinoza MD   10 mg at 09/01/17 0912    cefazolin (ANCEF) 2 gram in dextrose 5% 50 mL IVPB (premix)  2 g Intravenous On Call Procedure Jose R Almonte MD        dextrose 50% injection 12.5 g  12.5 g Intravenous PRN Charbel Ritter MD   12.5 g at 08/30/17 0449    DOBUtamine 1000 mg in D5W 250 mL infusion (premix non-titrating)  5 mcg/kg/min (Dosing Weight) Intravenous Continuous Sunny Downing MD 6 mL/hr at 09/06/17 2341 5 mcg/kg/min at 09/06/17 2341    dronabinol capsule 5 mg  5 mg Oral BID Geovanna Marques NP   5 mg at 09/08/17 0935    ertapenem (INVANZ) 1 g in sodium chloride 0.9 % 100 mL IVPB (ready to mix system)  1 g Intravenous Q24H Susannah Elizabeth PA-C 200 mL/hr at 09/07/17 2136 1 g at 09/07/17 2136    glucagon (human recombinant) injection 1 mg  1 mg Intramuscular PRN Charbel Ritter MD        hydrALAZINE injection 10 mg  10 mg Intravenous Q6H PRN Shlomo Serrato MD   10 mg at 09/05/17 0610    hydrALAZINE tablet 50 mg  50 mg Oral Q8H Geovanna Marques NP   50 mg at 09/08/17 0601    hydrocodone-acetaminophen 5-325mg per tablet 1 tablet  1 tablet Oral Q6H PRN Nadia Lucia, NP   1 tablet at 09/06/17 1546    insulin aspart pen 0-5 Units  0-5  Units Subcutaneous Q4H PRN Charbel Ritter MD   2 Units at 08/10/17 0751    lidocaine 5 % patch 1 patch  1 patch Transdermal Q24H Geovanna Marques NP   1 patch at 09/07/17 1236    magnesium oxide tablet 800 mg  800 mg Oral TID Geovanna Marques NP   800 mg at 09/08/17 0601    ondansetron injection 4 mg  4 mg Intravenous Q6H PRN Shayne Espinoza MD   4 mg at 09/08/17 0928    pantoprazole EC tablet 40 mg  40 mg Oral Daily Geovanna Marques NP   40 mg at 09/08/17 0800    potassium chloride SA CR tablet 40 mEq  40 mEq Oral BID Geovanna Marques NP   40 mEq at 09/08/17 0800    pravastatin tablet 20 mg  20 mg Oral QHS Geovanna Marques NP   20 mg at 09/07/17 2136    sodium chloride 0.9% flush 10 mL  10 mL Intravenous Q6H Sunny Downing MD   10 mL at 09/08/17 0601    And    sodium chloride 0.9% flush 10 mL  10 mL Intravenous PRN Sunny Downing MD           Physical Examination:  Vital Signs:   Vitals:    09/08/17 0759   BP: (!) 82/0   Pulse:    Resp:    Temp:      Cardiovascular:  [x] Regular rate and rhythm  []  Other  [x]  No edema []  Edema present  [x]  Clear to auscultation  VAD sounds smooth  Skin:  Incision is [x]  Clean, dry and intact.    Sternum:  [x]  Stable []  Unstable  Driveline(s):   [x]  Clean, dry and intact.     Labs:  CBC, BMP, LDH, INR reviewed    Procedure:  Device Interrogation including analysis of device parameters.  Current Settings   [x]  Ventricular Assist Device     Review of device function is [x]  Stable     TXP LVAD INTERROGATIONS 9/8/2017 9/8/2017 9/8/2017 9/7/2017 9/7/2017 9/7/2017 9/7/2017   Type HeartMate3 (No Data) HeartMate3 HeartMate3 HeartMate3 HeartMate3 HeartMate3   Flow 4.3 - 4.3 4.5 4.7 4.4 4.3   Speed 5200 - 5200 5200 5200 5200 5200   PI 3.7 - 3.6 4.0 3.7 3.6 3.6   Power ([place]) 3.6 - 3.6 3.6 3.5 3.5 3.6   LSL - - 4800 4800 4800 - -   Pulsatility - - Pulse Pulse Pulse - -   Some recent data might be hidden       Assessment:  [x]  Primary Cardiomyopathy [x]  Congestive  Heart Failure   []  Atrial Fibrillation []  Ventricular Tachycardia   []  Aftercare cardiac device [x]  Long term (current) use of anticoagulants   []  Ventilator-associated pneumonia []  Pneumonia viral, unspecified   []  Pneumonia, bacterial, unspecified []  Pneumonia, organism unspecified   []  Hemorrhage of GI tract, unspecified    []  Nosebleed []  Driveline infection    [x]  Decubitus/sacral-improvement          Plan:  [x]  Interval history obtained from ICU attending team member during rounding today  [x]  VAD/MATT teaching performed with patient  [x]  Mobilization / Physical Therapy ongoing  [x]  Anticoagulation-Coumadin holding    Re consult EP for new AICD lead  HTN: Norvasc and hydralazine   Hypernatremia: resolved  Nutrition following   Added Marinnol   Wound care following  Specialty overlay bed  Continue inotropic support  Lasix 40mg BID  Regular diet   Will transfer to [facility] next week  EP has seen patient can revise AICD when CTS is ready   Discharge planning ongoing     Total time spent was 36 minutes.  Of which more than 50 percent of the care dominated counseling and coordinating care with different team members. The VAD was interrogated and all parameters were WNL and no significant findings were found in the history. All these findings are documented in the note above.      Date of Service: 09/07/2017

## 2017-09-08 NOTE — PT/OT/SLP PROGRESS
"Occupational Therapy  Treatment    Suman Hayden   MRN: [id number]   Admitting Diagnosis: LVAD (left ventricular assist device) present    OT Date of Treatment: 09/08/17   OT Start Time: 1020  OT Stop Time: 1058  OT Total Time (min): 38 min    Billable Minutes:  Self Care/Home Management 30 and Therapeutic Activity 8    General Precautions: Standard, LVAD, fall, contact  General Precautions: Standard, LVAD, fall, contact        Subjective:  Communicated with RN prior to session.  " I am ok"- Pt agreeable to OT session   Pain/Comfort  Pain Rating 1: 0/10     Objective:  Patient found reclined in bed  with: telemetry, PICC line (LVAD to mobile power unit ), spouse at bedside      Functional Mobility:  Bed Mobility:  Supine to Sit: Contact Guard Assistance  Sit to Supine: Minimum Assistance     Transfers:   Sit <> Stand Assistance: Moderate Assistance  Sit <> Stand Assistive Device: No Assistive Device  Bed <> Chair Technique: Stand Pivot  Bed <> Chair Transfer Assistance: Moderate Assistance ( bedside commode to W/C txfer)  Bed <> Chair Assistive Device: No Assistive Device     Functional Ambulation: Pt assisted to wheelchair chair this visit      Activities of Daily Living:  UE Dressing Level of Assistance: Moderate assistance (donning overhead shirt)  LE Dressing Level of Assistance: Moderate assistance (donning underwear, donning pants) . Pt needs assist with threading RLE and pulling pants to waist, donning shoes  Toileting: Max assist     Balance:   Static Sit: GOOD-: Takes MODERATE challenges from all directions but inconsistently  Dynamic Sit: FAIR+: Maintains balance through MINIMAL excursions of active trunk motion  Static Stand:Fair-  Dynamic stand: Poor plus      Therapeutic Activities and Exercises:  Pt educated on role of OT, plan of care, safety awareness, DME, importance of out of bed activity, energy conservation techniques  LVAD education included equipment names, battery rotation, daily log, contents of " "emergency bag, consolidation bag;. Pt needs Min to Mod Assist with LVAD management. Pt able to perform LVAD controller daily self test with Supervision.         AM-PAC 6 CLICK ADL   How much help from another person does this patient currently need?   1 = Unable, Total/Dependent Assistance  2 = A lot, Maximum/Moderate Assistance  3 = A little, Minimum/Contact Guard/Supervision  4 = None, Modified [place]/Independent     Putting on and taking off regular lower body clothing? : 2  Bathing (including washing, rinsing, drying)?: 2  Toileting, which includes using toilet, bedpan, or urinal? : 2  Putting on and taking off regular upper body clothing?: 2  Taking care of personal grooming such as brushing teeth?: 3  Eating meals?: 3  Total Score: 14      AM-PAC Raw Score CMS "G-Code Modifier Level of Impairment Assistance   6 % Total / Unable   7 - 8 CM 80 - 100% Maximal Assist   9-13 CL 60 - 80% Moderate Assist   14 - 19 CK 40 - 60% Moderate Assist   20 - 22 CJ 20 - 40% Minimal Assist   23 CI 1-20% SBA / CGA   24 CH 0% Independent/ Mod I         Patient left up in chair with all lines intact, call button in reach and RN notified     ASSESSMENT:  Suman Hayden is a 67 y.o. male with a medical diagnosis of LVAD (left ventricular assist device) present and presents with improved overall activity tolerance and improved ADL performance this visit. Pt continues to require Mod Assist for transfers. Pt tolerated OT treatment session well. Continue OT plan of care     Rehab identified problem list/impairments: Rehab identified problem list/impairments: weakness, impaired endurance, impaired self care skills, impaired functional mobilty, impaired balance, impaired cardiopulmonary response to activity     Rehab potential is good.     Activity tolerance: Good     Discharge recommendations: Discharge Facility/Level Of Care Needs: rehabilitation facility      Barriers to discharge: Barriers to Discharge: None     Equipment " recommendations:  (TBD closer to d/c date)     GOALS:    Occupational Therapy Goals        Problem: Occupational Therapy Goal    Goal Priority Disciplines Outcome Interventions   Occupational Therapy Goal     OT, PT/OT Ongoing (interventions implemented as appropriate)    Description:  Goals to be met by:  2 weeks 9/12/17    Patient will increase functional independence with ADLs by performing:  Feeding: Independent   UE Dressing with Supervision.  LE Dressing with Supervision.  Grooming while standing with Supervision.  Toileting from toilet with Supervision for hygiene and clothing management.   Stand pivot transfers with Supervision.  Toilet transfer to toilet with Supervision.  Pt will be supervision  with LVAD yasmin't.     Goals to be met by:  2 weeks 8/28/17    Patient will increase functional independence with ADLs by performing:  Feeding: Independent   UE Dressing with Supervision.  LE Dressing with Supervision.  Grooming while standing with Supervision.  Toileting from toilet with Supervision for hygiene and clothing management.   Stand pivot transfers with Supervision.  Toilet transfer to toilet with Supervision.  Pt will be supervision  with LVAD yasmin't.                   Multidisciplinary Problems (Resolved)        Problem: Occupational Therapy Goal    Goal Priority Disciplines Outcome Interventions   Occupational Therapy Goal   (Resolved)     OT, PT/OT  Error    Description:  Goals to be met by: 8/04/2017    Patient will increase functional independence with ADLs by performing:    Increased functional strength to 5/5 for improved ADL performance.  Upper extremity exercise program and R LE ankle pumps  3x 10 reps per handout, with independence.                      Plan:  Patient to be seen 6 x/week to address the above listed problems via self-care/home management, therapeutic activities, therapeutic exercises  Plan of Care expires: 09/21/17  Plan of Care reviewed with: patient, spouse          Beatriz Dubose, OT  09/08/2017

## 2017-09-08 NOTE — ASSESSMENT & PLAN NOTE
-NICM  -Last 2D Echo 9/1/2017: LVEF 10-15%, LVEDD 6.3 cm  -Current diuretic regimen: Furosemide IVP. Net even over the last 24 hours. Will I/Os not accurate. Diuretic regimen per primary.  -GDMT with n/a  -2g Na dietary restriction, 1500 mL fluid restriction, strict I/Os

## 2017-09-08 NOTE — PROGRESS NOTES
"  [facility]  Adult Nutrition  Consult Note    SUMMARY     Recommendations    1. Continue current regular diet + Boost Plus ONS. Encourage adequate PO intake as tolerated.   2. If poor PO intake continues, consider resuming parenteral nutrition. Clinimix 5/20 @ 75 mL/hr + 250 mL 20% lipids would meet pt's estimated needs.   = 2084 calories, 90 g of protein, 1800 mL fluid (GIR: 3.25).   3. RD to monitor & follow-up.    Goals: Meet >/=85% of EEN/EPN  Nutrition Goal Status: goal not met  Communication of RD Recs: reviewed with RN    Reason for Assessment    Reason for Assessment: RD follow-up  Diagnosis:  (s/p LVAD)  Relevent Medical History: NICM, HTN, HLD   Interdisciplinary Rounds: attended     General Information Comments: Pt on regular diet, Boost Plus ONS ordered. Pt continues with poor appetite & nausea/vomiting. TPN discontinued. S/p LVAD.  Nutrition Discharge Planning: Adequate nutrition    Nutrition Prescription Ordered    Current Diet Order: Regular diet     Nutrition Order Comments: 25-50% PO intake     Current Nutrition Support Formula Ordered: Discontinued     Oral Nutrition Supplement: Boost Plus ONS     Nutrition/Diet History    Patient Reported Diet/Restrictions/Preferences: general, low salt     Typical Food/Fluid Intake: Boost Plus ONS, regular diet w/ poor PO intake.     Factors Affecting Nutritional Intake: decreased appetite, nausea/vomiting    Labs/Tests/Procedures/Meds    Pertinent Labs Reviewed: reviewed  Pertinent Labs Comments: Creat 1.5, GFR 54.9  Pertinent Medications Reviewed: reviewed, pertinent  Pertinent Medications Comments: Dobutamine    Physical Findings    Overall Physical Appearance: lethargic, nourished  Oral/Mouth Cavity: tooth/teeth missing  Skin: incision (chest incision)    Anthropometrics    Height: 5' 8" (172.7 cm)  Weight Method: Standard Scale  Weight: 77 kg (169 lb 12.1 oz)    Ideal Body Weight (IBW), Male: 154 lb  % Ideal Body Weight, Male (lb): " 110.23 lb     BMI (Calculated): 25.9  BMI Grade: 25 - 29.9 - overweight     Usual Body Weight (UBW), k.8 kg (admit wt)     % Usual Body Weight: 115.78  % Weight Change From Usual Weight: -15.78 %    Estimated/Assessed Needs    Weight Used For Calorie Calculations: 79.9 kg (176 lb 2.4 oz)      Energy Calorie Requirements (kcal): 1936  Energy Need Method: [place]-St Jeor (x 1.25 (PAL)     Weight Used For Protein Calculations: 79.9 kg (176 lb 2.4 oz)  Protein Requirements:  g/d     Fluid Need Method: RDA Method, other (see comments) (Per MD or 1 mL/kcal)    Assessment and Plan    Inadequate energy intake r/t decreased appetite/nausea w/ vomiting AEB poor PO intake.  Status: New    Monitor and Evaluation    Food and Nutrient Intake: energy intake, food and beverage intake, enteral nutrition intake, parenteral nutrition intake  Food and Nutrient Adminstration: diet order, enteral and parenteral nutrition administration     Physical Activity and Function: nutrition-related ADLs and IADLs  Anthropometric Measurements: weight, weight change, body mass index  Biochemical Data, Medical Tests and Procedures: gastrointestinal profile, electrolyte and renal panel, glucose/endocrine profile, lipid profile, inflammatory profile  Nutrition-Focused Physical Findings: overall appearance    Nutrition Risk    Level of Risk: other (see comments) (2x/week)    Nutrition Follow-Up    RD Follow-up?: Yes

## 2017-09-09 LAB
ALBUMIN SERPL BCP-MCNC: 2.2 G/DL
ALP SERPL-CCNC: 107 U/L
ALT SERPL W/O P-5'-P-CCNC: 12 U/L
ANION GAP SERPL CALC-SCNC: 7 MMOL/L
AST SERPL-CCNC: 19 U/L
BASOPHILS # BLD AUTO: 0.06 K/UL
BASOPHILS NFR BLD: 0.8 %
BILIRUB SERPL-MCNC: 1.1 MG/DL
BUN SERPL-MCNC: 19 MG/DL
CALCIUM SERPL-MCNC: 8.2 MG/DL
CHLORIDE SERPL-SCNC: 111 MMOL/L
CO2 SERPL-SCNC: 25 MMOL/L
CREAT SERPL-MCNC: 1.5 MG/DL
DIFFERENTIAL METHOD: ABNORMAL
EOSINOPHIL # BLD AUTO: 0.2 K/UL
EOSINOPHIL NFR BLD: 2.7 %
ERYTHROCYTE [DISTWIDTH] IN BLOOD BY AUTOMATED COUNT: 17.5 %
EST. GFR  (AFRICAN AMERICAN): 54.9 ML/MIN/1.73 M^2
EST. GFR  (NON AFRICAN AMERICAN): 47.5 ML/MIN/1.73 M^2
GLUCOSE SERPL-MCNC: 93 MG/DL
HCT VFR BLD AUTO: 22.3 %
HGB BLD-MCNC: 7 G/DL
INR PPP: 4.3
LDH SERPL L TO P-CCNC: 301 U/L
LYMPHOCYTES # BLD AUTO: 1.2 K/UL
LYMPHOCYTES NFR BLD: 17.3 %
MAGNESIUM SERPL-MCNC: 2.1 MG/DL
MCH RBC QN AUTO: 27.9 PG
MCHC RBC AUTO-ENTMCNC: 31.4 G/DL
MCV RBC AUTO: 89 FL
MONOCYTES # BLD AUTO: 0.8 K/UL
MONOCYTES NFR BLD: 11.5 %
NEUTROPHILS # BLD AUTO: 4.8 K/UL
NEUTROPHILS NFR BLD: 67.6 %
PHOSPHATE SERPL-MCNC: 3.2 MG/DL
PLATELET # BLD AUTO: 243 K/UL
PMV BLD AUTO: 10.9 FL
POTASSIUM SERPL-SCNC: 5.1 MMOL/L
PROT SERPL-MCNC: 6.6 G/DL
PROTHROMBIN TIME: 44.3 SEC
RBC # BLD AUTO: 2.51 M/UL
SODIUM SERPL-SCNC: 143 MMOL/L
WBC # BLD AUTO: 7.16 K/UL

## 2017-09-09 PROCEDURE — 93750 INTERROGATION VAD IN PERSON: CPT | Mod: ,,, | Performed by: THORACIC SURGERY (CARDIOTHORACIC VASCULAR SURGERY)

## 2017-09-09 PROCEDURE — 25000003 PHARM REV CODE 250: Performed by: THORACIC SURGERY (CARDIOTHORACIC VASCULAR SURGERY)

## 2017-09-09 PROCEDURE — 99232 SBSQ HOSP IP/OBS MODERATE 35: CPT | Mod: GC,,, | Performed by: INTERNAL MEDICINE

## 2017-09-09 PROCEDURE — 99232 SBSQ HOSP IP/OBS MODERATE 35: CPT | Mod: ,,, | Performed by: INTERNAL MEDICINE

## 2017-09-09 PROCEDURE — 84100 ASSAY OF PHOSPHORUS: CPT

## 2017-09-09 PROCEDURE — 80053 COMPREHEN METABOLIC PANEL: CPT

## 2017-09-09 PROCEDURE — 83615 LACTATE (LD) (LDH) ENZYME: CPT

## 2017-09-09 PROCEDURE — 27000248 HC VAD-ADDITIONAL DAY

## 2017-09-09 PROCEDURE — 25000003 PHARM REV CODE 250: Performed by: NURSE PRACTITIONER

## 2017-09-09 PROCEDURE — 85025 COMPLETE CBC W/AUTO DIFF WBC: CPT

## 2017-09-09 PROCEDURE — 83735 ASSAY OF MAGNESIUM: CPT

## 2017-09-09 PROCEDURE — 20600001 HC STEP DOWN PRIVATE ROOM

## 2017-09-09 PROCEDURE — 99233 SBSQ HOSP IP/OBS HIGH 50: CPT | Mod: 24,,, | Performed by: THORACIC SURGERY (CARDIOTHORACIC VASCULAR SURGERY)

## 2017-09-09 PROCEDURE — A4216 STERILE WATER/SALINE, 10 ML: HCPCS | Performed by: THORACIC SURGERY (CARDIOTHORACIC VASCULAR SURGERY)

## 2017-09-09 PROCEDURE — 97535 SELF CARE MNGMENT TRAINING: CPT

## 2017-09-09 PROCEDURE — 63600175 PHARM REV CODE 636 W HCPCS: Performed by: NURSE PRACTITIONER

## 2017-09-09 PROCEDURE — 85610 PROTHROMBIN TIME: CPT

## 2017-09-09 PROCEDURE — 97530 THERAPEUTIC ACTIVITIES: CPT

## 2017-09-09 RX ADMIN — MAGNESIUM OXIDE TAB 400 MG (241.3 MG ELEMENTAL MG) 800 MG: 400 (241.3 MG) TAB at 01:09

## 2017-09-09 RX ADMIN — HYDRALAZINE HYDROCHLORIDE 50 MG: 50 TABLET ORAL at 05:09

## 2017-09-09 RX ADMIN — PANTOPRAZOLE SODIUM 40 MG: 40 TABLET, DELAYED RELEASE ORAL at 09:09

## 2017-09-09 RX ADMIN — DRONABINOL 5 MG: 2.5 CAPSULE ORAL at 09:09

## 2017-09-09 RX ADMIN — LIDOCAINE 1 PATCH: 50 PATCH TOPICAL at 12:09

## 2017-09-09 RX ADMIN — PRAVASTATIN SODIUM 20 MG: 20 TABLET ORAL at 08:09

## 2017-09-09 RX ADMIN — DRONABINOL 5 MG: 2.5 CAPSULE ORAL at 08:09

## 2017-09-09 RX ADMIN — Medication 10 ML: at 05:09

## 2017-09-09 RX ADMIN — MAGNESIUM OXIDE TAB 400 MG (241.3 MG ELEMENTAL MG) 800 MG: 400 (241.3 MG) TAB at 05:09

## 2017-09-09 RX ADMIN — MAGNESIUM OXIDE TAB 400 MG (241.3 MG ELEMENTAL MG) 800 MG: 400 (241.3 MG) TAB at 08:09

## 2017-09-09 RX ADMIN — AMLODIPINE BESYLATE 10 MG: 10 TABLET ORAL at 09:09

## 2017-09-09 RX ADMIN — Medication 10 ML: at 12:09

## 2017-09-09 RX ADMIN — HYDRALAZINE HYDROCHLORIDE 50 MG: 50 TABLET ORAL at 01:09

## 2017-09-09 RX ADMIN — HYDRALAZINE HYDROCHLORIDE 50 MG: 50 TABLET ORAL at 08:09

## 2017-09-09 NOTE — PROGRESS NOTES
Daily E and M and VAD Interrogation Note    Reason for Visit: HM III implant   Patient is seen in follow up for management            [x] Other - HM III     Interval History:  [] Interval history unobtainable due to intubation.  The [x] implant/[] explant date was 7/27/17     Events -  NAEON.  Eating more solid food and Boost.      Review of Systems:   Negative except as above.     Medications:  Current Facility-Administered Medications   Medication Dose Route Frequency Provider Last Rate Last Dose    albuterol nebulizer solution 2.5 mg  2.5 mg Nebulization Q4H PRN Shayne Espinoza MD        amlodipine tablet 10 mg  10 mg Oral Daily Geovanna Marques NP   10 mg at 09/09/17 0914    bisacodyl suppository 10 mg  10 mg Rectal Daily PRN Shayne Espinoza MD   10 mg at 09/01/17 0912    cefazolin (ANCEF) 2 gram in dextrose 5% 50 mL IVPB (premix)  2 g Intravenous On Call Procedure Jose R Almonte MD        dextrose 50% injection 12.5 g  12.5 g Intravenous PRN Charbel Ritter MD   12.5 g at 08/30/17 0449    DOBUtamine 1000 mg in D5W 250 mL infusion (premix non-titrating)  5 mcg/kg/min (Dosing Weight) Intravenous Continuous Sunny Downing MD 6 mL/hr at 09/08/17 2129 5 mcg/kg/min at 09/08/17 2129    dronabinol capsule 5 mg  5 mg Oral BID Geovanna Marques NP   5 mg at 09/09/17 0914    glucagon (human recombinant) injection 1 mg  1 mg Intramuscular PRN Charbel Ritter MD        hydrALAZINE injection 10 mg  10 mg Intravenous Q6H PRN Shlomo Serrato MD   10 mg at 09/05/17 0610    hydrALAZINE tablet 50 mg  50 mg Oral Q8H Geovanna Marques NP   50 mg at 09/09/17 0522    hydrocodone-acetaminophen 5-325mg per tablet 1 tablet  1 tablet Oral Q6H PRN Nadia Lucia NP   1 tablet at 09/06/17 1546    insulin aspart pen 0-5 Units  0-5 Units Subcutaneous Q4H PRN Charbel Ritter MD   2 Units at 08/10/17 0751    lidocaine 5 % patch 1 patch  1 patch Transdermal Q24H Geovanna Marques, NP   1 patch at 09/08/17 1257     magnesium oxide tablet 800 mg  800 mg Oral TID Geovanna Marques NP   800 mg at 09/09/17 0522    ondansetron injection 4 mg  4 mg Intravenous Q6H PRN Shayne Espinoza MD   4 mg at 09/08/17 0928    pantoprazole EC tablet 40 mg  40 mg Oral Daily Geovanna Marques NP   40 mg at 09/09/17 0914    pravastatin tablet 20 mg  20 mg Oral QHS Geovanna Marques NP   20 mg at 09/08/17 2129    sodium chloride 0.9% flush 10 mL  10 mL Intravenous Q6H Sunny Downing MD   10 mL at 09/09/17 0523    And    sodium chloride 0.9% flush 10 mL  10 mL Intravenous PRN Sunny Downing MD           Physical Examination:  Vital Signs:   Vitals:    09/09/17 0908   BP: (!) 85/0   Pulse:    Resp:    Temp:      Cardiovascular:  [x] Regular rate and rhythm  []  Other  [x]  No edema []  Edema present  [x]  Clear to auscultation  VAD sounds smooth  Skin:  Incision is [x]  Clean, dry and intact.    Sternum:  [x]  Stable []  Unstable  Driveline(s):   [x]  Clean, dry and intact.     Labs:  CBC, BMP, LDH, INR reviewed    Procedure:  Device Interrogation including analysis of device parameters.  Current Settings   [x]  Ventricular Assist Device     Review of device function is [x]  Stable     TXP LVAD INTERROGATIONS 9/9/2017 9/9/2017 9/9/2017 9/8/2017 9/8/2017 9/8/2017 9/8/2017   Type HeartMate3 HeartMate3 HeartMate3 HeartMate3 HeartMate3 HeartMate3 HeartMate3   Flow 4.3 4.2 4.3 4.8 4.3 4.3 4.3   Speed 5200 5200 5200 5200 5200 5200 5200   PI 3.8 3.5 3.5 3.8 3.4 3.5 3.7   Power ([place]) 3.7 3.6 3.6 3.4 3.6 3.6 3.6   LSL - - 4800 4800 - - -   Pulsatility - - Intermittent pulse Pulse - - -   Some recent data might be hidden       Assessment:  [x]  Primary Cardiomyopathy [x]  Congestive Heart Failure   []  Atrial Fibrillation []  Ventricular Tachycardia   []  Aftercare cardiac device [x]  Long term (current) use of anticoagulants   []  Ventilator-associated pneumonia []  Pneumonia viral, unspecified   []  Pneumonia, bacterial, unspecified []  Pneumonia,  organism unspecified   []  Hemorrhage of GI tract, unspecified    []  Nosebleed []  Driveline infection    [x]  Decubitus/sacral-improvement          Plan:  [x]  Interval history obtained from ICU attending team member during rounding today  [x]  VAD/MATT teaching performed with patient  [x]  Mobilization / Physical Therapy ongoing  [x]  Anticoagulation-Coumadin holding    D/C ertepenem due to elevated INR  HTN: Norvasc and hydralazine   Hypernatremia: resolved  Nutrition following   Added Marinnol   Wound care following  Continue inotropic support  Lasix holding   Regular diet   Will transfer to [facility] next week  EP has seen patient can revise AICD when CTS is ready   Discharge planning ongoing     Total time spent was 37 minutes.  Of which more than 50 percent of the care dominated counseling and coordinating care with different team members. The VAD was interrogated and all parameters were WNL and no significant findings were found in the history. All these findings are documented in the note above.      Date of Service: 09/07/2017        Cardiac Surgery Attending E and M (VAD) Note along with VAD Interrogation    I have seen and examined the patient and agree with the findings above    I also reviewed the patients clinical course and:  [x]  Hemodynamic & Respiratory parameters  [x]  Laboratory Data  [x]  Radiological studies     VAD Interrogated [x]      VAD Function is normal. Changes made []  None [x]      Interrogation of Ventricular assist device was performed with physician analysis of device parameters and review of device function. I have personally reviewed the interrogation findings and agree with findings as stated.

## 2017-09-09 NOTE — PLAN OF CARE
Problem: Patient Care Overview  Goal: Plan of Care Review  Outcome: Ongoing (interventions implemented as appropriate)   gtt continues to infuse at MD ordered rate. IV ABX therapy continued per MD order. Fall precautions reviewed with Pt, Pt remained free from falls/trauma/injury. VSS, LVAD hum smooth. VAD numbers WDL, no alarms noted over night. Denies any CP, SOB, palpitations, dizziness, pain and discomfort. Turning/repositioning independently in bed. Plan of care reviewed with patient and all questions answered, verbalizes understanding. No acute distress noted. Will continue to monitor.

## 2017-09-09 NOTE — SUBJECTIVE & OBJECTIVE
Interval History: patient feeling well, working with PT/OT.  Wife at bedside: no new complaints.  Reports his appetite is improving     Continuous Infusions:   DOBUTamine 5 mcg/kg/min (09/08/17 2129)     Scheduled Meds:   amlodipine  10 mg Oral Daily    dronabinol  5 mg Oral BID    hydrALAZINE  50 mg Oral Q8H    lidocaine  1 patch Transdermal Q24H    magnesium oxide  800 mg Oral TID    pantoprazole  40 mg Oral Daily    pravastatin  20 mg Oral QHS    sodium chloride 0.9%  10 mL Intravenous Q6H     PRN Meds:albuterol sulfate, bisacodyl, ceFAZolin (ANCEF) IVPB, dextrose 50%, glucagon (human recombinant), hydrALAZINE, hydrocodone-acetaminophen 5-325mg, insulin aspart, ondansetron, Flushing PICC Protocol **AND** sodium chloride 0.9% **AND** sodium chloride 0.9%    Review of patient's allergies indicates:  No Known Allergies  Objective:     Vital Signs (Most Recent):  Temp: 98.3 °F (36.8 °C) (09/09/17 1159)  Pulse: 81 (09/09/17 1145)  Resp: 18 (09/09/17 1145)  BP: (!) 80/0 (09/09/17 1159)  SpO2: (!) 94 % (09/09/17 1145) Vital Signs (24h Range):  Temp:  [97.1 °F (36.2 °C)-99.1 °F (37.3 °C)] 98.3 °F (36.8 °C)  Pulse:  [74-81] 81  Resp:  [16-18] 18  SpO2:  [94 %-98 %] 94 %  BP: (80-97)/(0-66) 80/0     Weight: 76.9 kg (169 lb 8.5 oz)  Body mass index is 25.78 kg/m².      Intake/Output Summary (Last 24 hours) at 09/09/17 1226  Last data filed at 09/09/17 0551   Gross per 24 hour   Intake            564.1 ml   Output              900 ml   Net           -335.9 ml       Hemodynamic Parameters:       Physical Exam   Constitutional: He is oriented to person, place, and time. He appears well-developed and well-nourished.   HENT:   Head: Normocephalic and atraumatic.   Eyes: EOM are normal. Pupils are equal, round, and reactive to light.   Neck: Normal range of motion. Neck supple. JVD present.   Cardiovascular: Normal rate and regular rhythm.    No murmur heard.  VAD hum smooth   Pulmonary/Chest: Effort normal and breath  sounds normal.   Abdominal: Soft. Bowel sounds are normal. He exhibits no distension. There is no tenderness. There is no guarding.   Musculoskeletal: Normal range of motion. He exhibits no edema.   Neurological: He is alert and oriented to person, place, and time.   Skin: Skin is warm and dry.   Nursing note and vitals reviewed.      Significant Labs:  CBC:    Recent Labs  Lab 09/09/17 0424   WBC 7.16   RBC 2.51*   HGB 7.0*   HCT 22.3*      MCV 89   MCH 27.9   MCHC 31.4*     BNP:    Recent Labs  Lab 09/08/17  0527   BNP 1,808*     CMP:    Recent Labs  Lab 09/09/17 0424   GLU 93   CALCIUM 8.2*   ALBUMIN 2.2*   PROT 6.6      K 5.1   CO2 25   *   BUN 19   CREATININE 1.5*   ALKPHOS 107   ALT 12   AST 19   BILITOT 1.1*      Coagulation:     Recent Labs  Lab 09/08/17  0020  09/09/17  0424   INR 3.6*  < > 4.3*   APTT 37.9*  --   --    < > = values in this interval not displayed.  LDH:    Recent Labs  Lab 09/07/17  0506 09/08/17 0527 09/09/17  0424   * 273* 301*     Microbiology:  Microbiology Results (last 7 days)     Procedure Component Value Units Date/Time    Blood culture [[id number]] Collected:  08/29/17 0836    Order Status:  Completed Specimen:  Blood from Line, Jugular, Internal Right Updated:  09/03/17 1022     Blood Culture, Routine No growth after 5 days.    Narrative:       FromTrialysis,prior to removing line.          I have reviewed all pertinent labs within the past 24 hours.    Estimated Creatinine Clearance: 46.2 mL/min (based on SCr of 1.5 mg/dL (H)).    Diagnostic Results:  I have reviewed and interpreted all pertinent imaging results/findings within the past 24 hours.

## 2017-09-09 NOTE — PT/OT/SLP PROGRESS
"Occupational Therapy  Treatment    Suman Hayden   MRN: [id number]   Admitting Diagnosis: LVAD (left ventricular assist device) present    OT Date of Treatment: 09/09/17   OT Start Time: 0900  OT Stop Time: 1010  OT Total Time (min): 70 min    Billable Minutes:  Self Care/Home Management 45 and Therapeutic Activity 25    General Precautions: Standard, fall, LVAD  Orthopedic Precautions: N/A    Subjective:  Communicated with nsg prior to session.  "You need to calm down" pt states to spouse.    Pain/Comfort  Pain Rating 1: 0/10    Objective:   Pt found supine in bed with LVAD to A/C power and spouse in room.      Functional Mobility:  Bed Mobility:  Supine to Sit: Minimum Assistance    Transfers:   Sit <> Stand Assistance: Minimum Assistance  Sit <> Stand Assistive Device: No Assistive Device  Bed <> Chair Technique: Stand Pivot  Bed <> Chair Transfer Assistance: Contact Guard Assistance  Bed <> Chair Assistive Device: No Assistive Device  Toilet Transfer Technique: Stand Pivot  Toilet Transfer Assistance: Contact Guard Assistance  Toilet Transfer Assistive Device: bedside commode      Activities of Daily Living:  Feeding Level of Assistance: Independent  UE Dressing Level of Assistance: Set-up Assistance  LE Dressing Level of Assistance: Minimum assistance  Grooming Position: Seated  Grooming Level of Assistance: Supervision  Toileting Where Assessed: Bedside commode  Toileting Level of Assistance: Minimum assistance        Self test and recording of LVAD numbers had been completed prior to OT arrival. Pt's spouse also had correctly completed battery rotation.   Pt transitioned self from A/C power to battery power with minimal cues and minimal assistance. Pt/spouse wanting pt to wear vest this date. Spouse requesting OT thread waist strap through belt loops of pants to support controller. OT provided education to pt/spouse re: decreased safety with this technique in the event pt would need to lower his pants to use the " "rest room. Then, spouse reports she wants controller attached to horizontal strap of the vest across the chest. OT provided education to pt/spouse re: correct placement/purpose of waste strap for controller support when using the vest/holster. Spouse stating she wanted controller attached to horizontal chest strap as it had been completed last date.   Overall, pt required TOTAL A for placement of LVAD parts in wearing device.      AM-PAC 6 CLICK ADL   How much help from another person does this patient currently need?   1 = Unable, Total/Dependent Assistance  2 = A lot, Maximum/Moderate Assistance  3 = A little, Minimum/Contact Guard/Supervision  4 = None, Modified [place]/Independent    Putting on and taking off regular lower body clothing? : 3  Bathing (including washing, rinsing, drying)?: 3  Toileting, which includes using toilet, bedpan, or urinal? : 3  Putting on and taking off regular upper body clothing?: 3  Taking care of personal grooming such as brushing teeth?: 3  Eating meals?: 3  Total Score: 18     AM-PAC Raw Score CMS "G-Code Modifier Level of Impairment Assistance   6 % Total / Unable   7 - 8 CM 80 - 100% Maximal Assist   9-13 CL 60 - 80% Moderate Assist   14 - 19 CK 40 - 60% Moderate Assist   20 - 22 CJ 20 - 40% Minimal Assist   23 CI 1-20% SBA / CGA   24 CH 0% Independent/ Mod I       Patient left up in chair with all lines intact, call button in reach and spouse present    ASSESSMENT:  Suman Hayden is a 67 y.o. male with a medical diagnosis of LVAD (left ventricular assist device) present and tolerated session fairly well. Pt with good effort and improved performance with LVAD yasmin't compared to prior sessions. Spouse attentive to pt's needs; however, spouse moves quickly and is tangential in thoughts at this time. Encouragement and pacing provided for spouse and pt to allow for increased safety and efficiency with all tasks. Increased time needed for all activity this date.  Pt with " limited initiation for any task related to mobility/ADL or LVAD and spouse is very quick to engage and assist pt with all needs.  Pt to benefit from cont OT to address stated goals.     Rehab identified problem list/impairments: Rehab identified problem list/impairments: weakness, impaired functional mobilty, impaired balance, gait instability, impaired self care skills, impaired endurance    Rehab potential is good.    Activity tolerance: Good    Discharge recommendations: Discharge Facility/Level Of Care Needs: rehabilitation facility     GOALS:    Occupational Therapy Goals        Problem: Occupational Therapy Goal    Goal Priority Disciplines Outcome Interventions   Occupational Therapy Goal     OT, PT/OT Ongoing (interventions implemented as appropriate)    Description:  Goals to be met by:  2 weeks 9/12/17    Patient will increase functional independence with ADLs by performing:  Feeding: Independent   UE Dressing with Supervision.  LE Dressing with Supervision.  Grooming while standing with Supervision.  Toileting from toilet with Supervision for hygiene and clothing management.   Stand pivot transfers with Supervision.  Toilet transfer to toilet with Supervision.  Pt will be supervision  with LVAD yasmin't.     Goals to be met by:  2 weeks 8/28/17    Patient will increase functional independence with ADLs by performing:  Feeding: Independent   UE Dressing with Supervision.  LE Dressing with Supervision.  Grooming while standing with Supervision.  Toileting from toilet with Supervision for hygiene and clothing management.   Stand pivot transfers with Supervision.  Toilet transfer to toilet with Supervision.  Pt will be supervision  with LVAD yasmin't.                   Multidisciplinary Problems (Resolved)        Problem: Occupational Therapy Goal    Goal Priority Disciplines Outcome Interventions   Occupational Therapy Goal   (Resolved)     OT, PT/OT  Error    Description:  Goals to be met by:  8/04/2017    Patient will increase functional independence with ADLs by performing:    Increased functional strength to 5/5 for improved ADL performance.  Upper extremity exercise program and R LE ankle pumps  3x 10 reps per handout, with independence.                      Plan:  Patient to be seen 6 x/week to address the above listed problems via self-care/home management, therapeutic exercises, therapeutic activities  Plan of Care expires: 09/21/17  Plan of Care reviewed with: patient, spouse         DELMY Navarro  09/09/2017

## 2017-09-09 NOTE — PROGRESS NOTES
LOS: 53 days     24h events and S:  Suman Hayden is a 67 y.o. male with HR 79. No events. On abx.    O:  Vitals:  Temp: 98.3 °F (36.8 °C) (09/09/17 1159)  Pulse: 81 (09/09/17 1145)  Resp: 18 (09/09/17 1145)  BP: (!) 80/0 (09/09/17 1159)  SpO2: (!) 94 % (09/09/17 1145)    Ins/Outs:    Intake/Output Summary (Last 24 hours) at 09/09/17 1332  Last data filed at 09/09/17 0551   Gross per 24 hour   Intake            384.1 ml   Output              350 ml   Net             34.1 ml       Physical Exam:  General: alert and oriented, conversant  Heart: hum of the VAD, no r/g appreciated  Lungs: CTAB  Abdomen: soft, NT, ND, +BS  Vascular: 2+ radial pulse, no edema    Medications:   amlodipine  10 mg Oral Daily    dronabinol  5 mg Oral BID    hydrALAZINE  50 mg Oral Q8H    lidocaine  1 patch Transdermal Q24H    magnesium oxide  800 mg Oral TID    pantoprazole  40 mg Oral Daily    pravastatin  20 mg Oral QHS    sodium chloride 0.9%  10 mL Intravenous Q6H      DOBUTamine 5 mcg/kg/min (09/08/17 2129)     albuterol sulfate, bisacodyl, ceFAZolin (ANCEF) IVPB, dextrose 50%, glucagon (human recombinant), hydrALAZINE, hydrocodone-acetaminophen 5-325mg, insulin aspart, ondansetron, Flushing PICC Protocol **AND** sodium chloride 0.9% **AND** sodium chloride 0.9%    Review of patient's allergies indicates:  No Known Allergies    Labs:  CBC with Diff:     Recent Labs  Lab 09/08/17  0020 09/08/17  0527 09/09/17  0424   WBC 7.25 7.15 7.16   HGB 7.1* 7.2* 7.0*   HCT 22.7* 22.7* 22.3*    237 243   LYMPH 16.7*  1.2 15.1*  1.1 17.3*  1.2   MONO 10.2  0.7 12.2  0.9 11.5  0.8   EOSINOPHIL 2.5 3.1 2.7       COAG:    Recent Labs  Lab 09/08/17  0020 09/08/17  0527 09/09/17  0424   APTT 37.9*  --   --    INR 3.6* 4.2* 4.3*       CMP:   Recent Labs  Lab 09/06/17  0409 09/07/17  0506 09/08/17  0020 09/08/17  0527 09/09/17  0424   GLU 53* 25* 61* 75 93   CALCIUM 8.2* 8.4* 8.4* 8.4* 8.2*   ALBUMIN 2.2*  --   --  2.3* 2.2*   PROT 6.6   --   --  6.7 6.6    144 143 144 143   K 4.2 4.5 4.4 4.9 5.1   CO2 25 22* 25 23 25   * 111* 110 112* 111*   BUN 22 22 20 20 19   CREATININE 1.2 1.3 1.5* 1.5* 1.5*   ALKPHOS 115  --   --  111 107   ALT 16  --   --  14 12   AST 23  --   --  19 19   BILITOT 1.2*  --   --  1.1* 1.1*   MG 1.7 1.7  --  1.8 2.1   PHOS 3.2 3.5  --  3.5 3.2     Estimated Creatinine Clearance: 46.2 mL/min (based on SCr of 1.5 mg/dL (H)).    .  Recent Labs  Lab 09/08/17  0527   BNP 1,808*         Diagnostic Results:  Ejection Fractions   EF   Date Value Ref Range Status   09/08/2017 10 (A) 55 - 65    09/01/2017 10 (A) 55 - 65    08/23/2017 20 (A) 55 - 65         Infectious disease labs:  Microbiology Results (last 7 days)     Procedure Component Value Units Date/Time    Blood culture [[id number]] Collected:  08/29/17 0836    Order Status:  Completed Specimen:  Blood from Line, Jugular, Internal Right Updated:  09/03/17 1022     Blood Culture, Routine No growth after 5 days.    Narrative:       FromTrialysis,prior to removing line.          Assessment and Plan:  Suman Hayden is a 67 y.o. 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 He is known to have VT aggravated by underlying AT/AFL. He is no longer on amiodarone.      AICD present  - plan to switch LV lead and RV pace sense pin within the header  - might add pace sense lead but only if the aforementioned plan doesn't work  - revision likely next week     AF  - c/w warfarin    Jose R Almonte MD

## 2017-09-09 NOTE — PLAN OF CARE
Problem: Patient Care Overview  Goal: Plan of Care Review  Plan of care discussed with patient. Patient is free of fall/trauma/injury. Denies CP, SOB, or pain/discomfort. Working on vad education and PT/ OT. INR 4.3. Lead revision and ICD replacement scheduled for when INR is therapeutic. Antibiotic stopped.  All questions addressed. Will continue to monitor

## 2017-09-09 NOTE — PROGRESS NOTES
[facility]  Heart Transplant  Progress Note    Patient Name: Suman Hayden  MRN: [id number]  Admission Date: 7/18/2017  Hospital Length of Stay: 53 days  Attending Physician: Sunny Downing MD  Primary Care Provider: Joe Ernst MD  Principal Problem:LVAD (left ventricular assist device) present    Subjective:     Interval History: patient feeling well, working with PT/OT.  Wife at bedside: no new complaints.  Reports his appetite is improving     Continuous Infusions:   DOBUTamine 5 mcg/kg/min (09/08/17 2129)     Scheduled Meds:   amlodipine  10 mg Oral Daily    dronabinol  5 mg Oral BID    hydrALAZINE  50 mg Oral Q8H    lidocaine  1 patch Transdermal Q24H    magnesium oxide  800 mg Oral TID    pantoprazole  40 mg Oral Daily    pravastatin  20 mg Oral QHS    sodium chloride 0.9%  10 mL Intravenous Q6H     PRN Meds:albuterol sulfate, bisacodyl, ceFAZolin (ANCEF) IVPB, dextrose 50%, glucagon (human recombinant), hydrALAZINE, hydrocodone-acetaminophen 5-325mg, insulin aspart, ondansetron, Flushing PICC Protocol **AND** sodium chloride 0.9% **AND** sodium chloride 0.9%    Review of patient's allergies indicates:  No Known Allergies  Objective:     Vital Signs (Most Recent):  Temp: 98.3 °F (36.8 °C) (09/09/17 1159)  Pulse: 81 (09/09/17 1145)  Resp: 18 (09/09/17 1145)  BP: (!) 80/0 (09/09/17 1159)  SpO2: (!) 94 % (09/09/17 1145) Vital Signs (24h Range):  Temp:  [97.1 °F (36.2 °C)-99.1 °F (37.3 °C)] 98.3 °F (36.8 °C)  Pulse:  [74-81] 81  Resp:  [16-18] 18  SpO2:  [94 %-98 %] 94 %  BP: (80-97)/(0-66) 80/0     Weight: 76.9 kg (169 lb 8.5 oz)  Body mass index is 25.78 kg/m².      Intake/Output Summary (Last 24 hours) at 09/09/17 1226  Last data filed at 09/09/17 0551   Gross per 24 hour   Intake            564.1 ml   Output              900 ml   Net           -335.9 ml       Hemodynamic Parameters:       Physical Exam   Constitutional: He is oriented to person, place, and time. He appears  well-developed and well-nourished.   HENT:   Head: Normocephalic and atraumatic.   Eyes: EOM are normal. Pupils are equal, round, and reactive to light.   Neck: Normal range of motion. Neck supple. JVD present.   Cardiovascular: Normal rate and regular rhythm.    No murmur heard.  VAD hum smooth   Pulmonary/Chest: Effort normal and breath sounds normal.   Abdominal: Soft. Bowel sounds are normal. He exhibits no distension. There is no tenderness. There is no guarding.   Musculoskeletal: Normal range of motion. He exhibits no edema.   Neurological: He is alert and oriented to person, place, and time.   Skin: Skin is warm and dry.   Nursing note and vitals reviewed.      Significant Labs:  CBC:    Recent Labs  Lab 09/09/17 0424   WBC 7.16   RBC 2.51*   HGB 7.0*   HCT 22.3*      MCV 89   MCH 27.9   MCHC 31.4*     BNP:    Recent Labs  Lab 09/08/17 0527   BNP 1,808*     CMP:    Recent Labs  Lab 09/09/17 0424   GLU 93   CALCIUM 8.2*   ALBUMIN 2.2*   PROT 6.6      K 5.1   CO2 25   *   BUN 19   CREATININE 1.5*   ALKPHOS 107   ALT 12   AST 19   BILITOT 1.1*      Coagulation:     Recent Labs  Lab 09/08/17  0020  09/09/17  0424   INR 3.6*  < > 4.3*   APTT 37.9*  --   --    < > = values in this interval not displayed.  LDH:    Recent Labs  Lab 09/07/17  0506 09/08/17 0527 09/09/17  0424   * 273* 301*     Microbiology:  Microbiology Results (last 7 days)     Procedure Component Value Units Date/Time    Blood culture [[id number]] Collected:  08/29/17 0836    Order Status:  Completed Specimen:  Blood from Line, Jugular, Internal Right Updated:  09/03/17 1022     Blood Culture, Routine No growth after 5 days.    Narrative:       FromTrialysis,prior to removing line.          I have reviewed all pertinent labs within the past 24 hours.    Estimated Creatinine Clearance: 46.2 mL/min (based on SCr of 1.5 mg/dL (H)).    Diagnostic Results:  I have reviewed and interpreted all pertinent imaging  results/findings within the past 24 hours.    Assessment and Plan:     * LVAD (left ventricular assist device) present    -HeartMate 3 Implanted 7/27/2017 as DT   -s/p delayed chest closure (7/28/17) and extubated (7/29/17), reintubated 8/9/17 and extubated 8/13/17  -CTS Primary  -Implanted by Dr. Downing  -Continue Coumadin, Goal INR 2.0-3.0. Hold coumadin tonight   -Antiplatelets : n/a  -LDH is stable overall today. Will continue to monitor daily.  -Speed set at 5200 rpm  -Interrogation notable for no events  -Not listed for OHTx           Bacteremia    - ESBL bacteremia.Cont ertapenem. Per ID- continue ertapenem x 4-6 weeks from 8/11. If discharged on ertapenem needs weekly cbc and cmp while on antibiotics (please fax results to ID clinic)  - blood cultures from 8/29 NGTD          Essential hypertension    -Patient is pulsatile  -MAP goal 60-80 mmHg  -Blood pressure uncontrolled over the last 24 hours  -Antihypertensive medications include Amlodipine and Hydralazine  Will monitor trends              V-tach    - Recommend resuming Amio, NSVT on tele        CHF (NYHA class IV, ACC/AHA stage D)    -NICM  -Last 2D Echo 9/1/2017: LVEF 10-15%, LVEDD 6.3 cm  -Current diuretic regimen: Furosemide IVP. Net even over the last 24 hours. Will I/Os not accurate. Diuretic regimen per primary.  -GDMT with n/a  -2g Na dietary restriction, 1500 mL fluid restriction, strict I/Os          Hyperlipidemia    -continue pravastatin        Hepatitis B core antibody positive since 2012              Stage III pressure ulcer of sacral region    - wound care        Atrial fibrillation    -AC, Amio per C TS          Hyperglycemia    -per primary team        Atrial tachycardia    - [id number] - 3  -Rec restarting Amio. AC per CTS        AICD discharge    - Appropriate in the setting of VT aggravated by underlying AT/AFL (earlier during the admission). Device reprogrammed to VVI 80 this admission              MELISSA Jon  Heart  Transplant spectralink: [phone] on call [phone]  [facility]

## 2017-09-10 LAB
ALBUMIN SERPL BCP-MCNC: 2.3 G/DL
ALP SERPL-CCNC: 119 U/L
ALT SERPL W/O P-5'-P-CCNC: 13 U/L
ANION GAP SERPL CALC-SCNC: 6 MMOL/L
AST SERPL-CCNC: 18 U/L
BASOPHILS # BLD AUTO: 0.05 K/UL
BASOPHILS NFR BLD: 0.7 %
BILIRUB SERPL-MCNC: 1.1 MG/DL
BUN SERPL-MCNC: 21 MG/DL
CALCIUM SERPL-MCNC: 8.3 MG/DL
CHLORIDE SERPL-SCNC: 112 MMOL/L
CO2 SERPL-SCNC: 26 MMOL/L
CREAT SERPL-MCNC: 1.4 MG/DL
DIFFERENTIAL METHOD: ABNORMAL
EOSINOPHIL # BLD AUTO: 0.2 K/UL
EOSINOPHIL NFR BLD: 3.1 %
ERYTHROCYTE [DISTWIDTH] IN BLOOD BY AUTOMATED COUNT: 17.5 %
EST. GFR  (AFRICAN AMERICAN): 59.7 ML/MIN/1.73 M^2
EST. GFR  (NON AFRICAN AMERICAN): 51.6 ML/MIN/1.73 M^2
GLUCOSE SERPL-MCNC: 107 MG/DL
HCT VFR BLD AUTO: 23.1 %
HGB BLD-MCNC: 7.2 G/DL
INR PPP: 3.9
LDH SERPL L TO P-CCNC: 284 U/L
LYMPHOCYTES # BLD AUTO: 1.3 K/UL
LYMPHOCYTES NFR BLD: 17.2 %
MAGNESIUM SERPL-MCNC: 2.4 MG/DL
MCH RBC QN AUTO: 27.8 PG
MCHC RBC AUTO-ENTMCNC: 31.2 G/DL
MCV RBC AUTO: 89 FL
MONOCYTES # BLD AUTO: 0.8 K/UL
MONOCYTES NFR BLD: 10.1 %
NEUTROPHILS # BLD AUTO: 5.1 K/UL
NEUTROPHILS NFR BLD: 68.5 %
PHOSPHATE SERPL-MCNC: 3 MG/DL
PLATELET # BLD AUTO: 250 K/UL
PMV BLD AUTO: 10.6 FL
POTASSIUM SERPL-SCNC: 4.5 MMOL/L
PROT SERPL-MCNC: 7 G/DL
PROTHROMBIN TIME: 39.9 SEC
RBC # BLD AUTO: 2.59 M/UL
SODIUM SERPL-SCNC: 144 MMOL/L
WBC # BLD AUTO: 7.44 K/UL

## 2017-09-10 PROCEDURE — 83615 LACTATE (LD) (LDH) ENZYME: CPT

## 2017-09-10 PROCEDURE — 85025 COMPLETE CBC W/AUTO DIFF WBC: CPT

## 2017-09-10 PROCEDURE — 83735 ASSAY OF MAGNESIUM: CPT

## 2017-09-10 PROCEDURE — 20600001 HC STEP DOWN PRIVATE ROOM

## 2017-09-10 PROCEDURE — 99232 SBSQ HOSP IP/OBS MODERATE 35: CPT | Mod: GC,,, | Performed by: INTERNAL MEDICINE

## 2017-09-10 PROCEDURE — 25000003 PHARM REV CODE 250: Performed by: THORACIC SURGERY (CARDIOTHORACIC VASCULAR SURGERY)

## 2017-09-10 PROCEDURE — A4216 STERILE WATER/SALINE, 10 ML: HCPCS | Performed by: THORACIC SURGERY (CARDIOTHORACIC VASCULAR SURGERY)

## 2017-09-10 PROCEDURE — 36415 COLL VENOUS BLD VENIPUNCTURE: CPT

## 2017-09-10 PROCEDURE — 63600175 PHARM REV CODE 636 W HCPCS: Performed by: NURSE PRACTITIONER

## 2017-09-10 PROCEDURE — 80053 COMPREHEN METABOLIC PANEL: CPT

## 2017-09-10 PROCEDURE — 97116 GAIT TRAINING THERAPY: CPT

## 2017-09-10 PROCEDURE — 27000248 HC VAD-ADDITIONAL DAY

## 2017-09-10 PROCEDURE — 97530 THERAPEUTIC ACTIVITIES: CPT

## 2017-09-10 PROCEDURE — 63600175 PHARM REV CODE 636 W HCPCS: Performed by: THORACIC SURGERY (CARDIOTHORACIC VASCULAR SURGERY)

## 2017-09-10 PROCEDURE — 85610 PROTHROMBIN TIME: CPT

## 2017-09-10 PROCEDURE — 84100 ASSAY OF PHOSPHORUS: CPT

## 2017-09-10 PROCEDURE — 99232 SBSQ HOSP IP/OBS MODERATE 35: CPT | Mod: ,,, | Performed by: INTERNAL MEDICINE

## 2017-09-10 PROCEDURE — 25000003 PHARM REV CODE 250: Performed by: NURSE PRACTITIONER

## 2017-09-10 RX ADMIN — HYDRALAZINE HYDROCHLORIDE 50 MG: 50 TABLET ORAL at 01:09

## 2017-09-10 RX ADMIN — Medication 10 ML: at 06:09

## 2017-09-10 RX ADMIN — HYDRALAZINE HYDROCHLORIDE 50 MG: 50 TABLET ORAL at 09:09

## 2017-09-10 RX ADMIN — MAGNESIUM OXIDE TAB 400 MG (241.3 MG ELEMENTAL MG) 800 MG: 400 (241.3 MG) TAB at 01:09

## 2017-09-10 RX ADMIN — HYDRALAZINE HYDROCHLORIDE 50 MG: 50 TABLET ORAL at 06:09

## 2017-09-10 RX ADMIN — PRAVASTATIN SODIUM 20 MG: 20 TABLET ORAL at 09:09

## 2017-09-10 RX ADMIN — DRONABINOL 5 MG: 2.5 CAPSULE ORAL at 09:09

## 2017-09-10 RX ADMIN — AMLODIPINE BESYLATE 10 MG: 10 TABLET ORAL at 08:09

## 2017-09-10 RX ADMIN — LIDOCAINE 1 PATCH: 50 PATCH TOPICAL at 12:09

## 2017-09-10 RX ADMIN — MAGNESIUM OXIDE TAB 400 MG (241.3 MG ELEMENTAL MG) 800 MG: 400 (241.3 MG) TAB at 06:09

## 2017-09-10 RX ADMIN — DOBUTAMINE IN DEXTROSE 5 MCG/KG/MIN: 400 INJECTION, SOLUTION INTRAVENOUS at 05:09

## 2017-09-10 RX ADMIN — PANTOPRAZOLE SODIUM 40 MG: 40 TABLET, DELAYED RELEASE ORAL at 08:09

## 2017-09-10 RX ADMIN — DRONABINOL 5 MG: 2.5 CAPSULE ORAL at 08:09

## 2017-09-10 RX ADMIN — MAGNESIUM OXIDE TAB 400 MG (241.3 MG ELEMENTAL MG) 800 MG: 400 (241.3 MG) TAB at 09:09

## 2017-09-10 RX ADMIN — Medication 10 ML: at 12:09

## 2017-09-10 NOTE — PT/OT/SLP PROGRESS
Physical Therapy  Treatment    Suman Hayden   MRN: [id number]   Admitting Diagnosis: LVAD (left ventricular assist device) present    PT Received On: 09/10/17  PT Start Time: 1134     PT Stop Time: 1217    PT Total Time (min): 43 min       Billable Minutes:  Gait Training8 and Therapeutic Activity 35    Treatment Type: Treatment  PT/PTA: PT     PTA Visit Number: 0       General Precautions: Standard, fall, LVAD  Orthopedic Precautions: N/A   Braces: N/A    Do you have any cultural, spiritual, Taoist conflicts, given your current situation?: none noted     Subjective:  Communicated with RN prior to session.  Pt agreeable to therapy.   Therapy tech Geno present throughout session.     Pain/Comfort  Pain Rating 1: 0/10    Objective:   Patient found with: telemetry, PICC line (LVAD to battery power)    Functional Mobility:  Bed Mobility:   Supine to Sit:  (not performed 2* pt UIC before and after treatment session)    Transfers:  Sit <> Stand Assistance: Moderate Assistance (x6 trials)  Sit <> Stand Assistive Device: Rolling Walker  Bed <> Chair Technique: Stand Pivot (bedside chair<>w/c)  Bed <> Chair Assistance: Maximum Assistance  Bed <> Chair Assistive Device: Rolling Walker, No Assistive Device (bedside>w/c with RW; w/c > bedside chair with HHA)    Max cues for transfer technique; no carry-over from one trial to the next     Gait:   Gait Distance: marches in place x15 reps BLE  Assistance 1: Maximum assistance (with additional person for safety)  Gait Assistive Device: Rolling walker  Gait Pattern: 2-point gait  Gait Deviation(s): backward lean, decreased weight-shifting ability (max posterior lean)   Attempted to perform gait training with max cues and assist for balance, forward weight-shifting, RW management, and postural control; however, pt with decreased balance and unable to sequence gait this session.     Balance:   Static Sit: SBA  Dynamic Sit: SBA  Static Stand: mod-maxA  Dynamic stand: maxA      Therapeutic Activities and Exercises:  Pt found on battery power but with batteries sitting next to him in chair (vest not donned). Required maxA to don vest and place controller and batteries in vest. Pt with decreased attention during LVAD management and education. Donned shoes with maxA. Sit<>stand with modA. Max A to complete LB dressing (pull up pants and button pants); maxA for static standing during dressing. Pt with decreasing postural control and requiring return to sit. Sit>stand with RW and modA. Attempted to begin ambulation but pt with poor postural control and RW management. Required maxA to maintain balance. Pt unable to regain balance and required return to sit in w/c. Pt brought to hallway in w/c to begin gait training for increased ease of ambulation and decreased obstacle navigation. Performed sit<>stand x2 additional reps with modA and RW. During each standing trial, pt with increased posterior lean requiring maxA to maintain standing balance. Pt given max v/c and t/c for anterior weight-shift, increased hip extension, and postural control. Pt repeatedly attempting to move feet forward instead, causing increased posterior lean. Pt with poor understanding and unable to correct balance despite max cues, ultimately requiring return to sit. Education and demonstration provided between standing trials but no understanding noted. Performed sit>stand (5th trial) with RW and modA. Performed marching in place x15 reps BLE with RW and maxA throughout for balance. Pt with fatigue and decreasing postural control requiring seated rest. Returned to room via w/c. Performed sit>stand and stand pivot from w/c to bedside chair with maxA and HHA. RN notified of pt behavior during session. MD also called following session to notify him of pt behavior/limited activity tolerance this session. Pt and pt's wife instructed that pt to transfer with staff assist only due to poor balance and increased fall risk.      AM-PAC 6 CLICK MOBILITY   How much help from another person does this patient currently need?   1 = Unable, Total/Dependent Assistance  2 = A lot, Maximum/Moderate Assistance  3 = A little, Minimum/Contact Guard/Supervision  4 = None, Modified [place]/Independent    Turning over in bed (including adjusting bedclothes, sheets and blankets)?: 3  Sitting down on and standing up from a chair with arms (e.g., wheelchair, bedside commode, etc.): 2  Moving from lying on back to sitting on the side of the bed?: 3  Moving to and from a bed to a chair (including a wheelchair)?: 2  Need to walk in hospital room?: 1  Climbing 3-5 steps with a railing?: 1  Total Score: 12    AM-PAC Raw Score CMS G-Code Modifier Level of Impairment Assistance   6 % Total / Unable   7 - 9 CM 80 - 100% Maximal Assist   10 - 14 CL 60 - 80% Moderate Assist   15 - 19 CK 40 - 60% Moderate Assist   20 - 22 CJ 20 - 40% Minimal Assist   23 CI 1-20% SBA / CGA   24 CH 0% Independent/ Mod I     Patient left up in chair with all lines intact, call button in reach, RN notified and pt's wife present.    Assessment:  Suman Hayden is a 67 y.o. male with a medical diagnosis of LVAD (left ventricular assist device) present, inserted 7/27/17. Pt with decline in mobility this session. Attempted standing and ambulation x6 trials, but pt with decreased balance and postural control during each trial, requiring maxA to maintain standing balance with increased posterior lean throughout. Pt given max cues, education, and assist for sequencing, postural control, and weight-shifting but pt with delayed progressing and unable to sequencing gait, ultimately requiring return to sit each trial. Pt would continue to benefit from skilled acute PT in order to address current deficits and progress functional mobility.     Rehab identified problem list/impairments: Rehab identified problem list/impairments: weakness, gait instability, impaired balance, impaired  endurance, impaired self care skills, impaired functional mobilty, decreased safety awareness, impaired cardiopulmonary response to activity, decreased coordination, impaired coordination    Rehab potential is good.    Activity tolerance: Good    Discharge recommendations: Discharge Facility/Level Of Care Needs: rehabilitation facility     Barriers to discharge: Barriers to Discharge: None    Equipment recommendations: Equipment Needed After Discharge: walker, rolling     GOALS:    Physical Therapy Goals        Problem: Physical Therapy Goal    Goal Priority Disciplines Outcome Goal Variances Interventions   Physical Therapy Goal     PT/OT, PT Ongoing (interventions implemented as appropriate)     Description:  Goals to be met by: 10/9/17     Patient will increase functional independence with mobility by performin. Supine to sit with MInimal Assistance-  Met 2017  2. Sit to supine with MInimal Assistance - not met  3. Sit to stand transfer with Minimal Assistance- met       Updated: Sit to stand transfer with supervision using RW   4. Bed to chair transfer with Minimal Assistance- not met  5. Gait  x 50 feet with Minimal Assistance. -  Met 2017  6. Lower extremity exercise program x15 reps, with supervision, in order to increase LE strength and (I) with functional mobility. - not met  7.Pt mod I supine to sit- not met  8. Pt receive gait training ~ 220 ft with AD if needed and supervision- not met                             PLAN:    Patient to be seen 6 x/week  to address the above listed problems via gait training, therapeutic activities, therapeutic exercises, neuromuscular re-education  Plan of Care expires: 10/09/17  Plan of Care reviewed with: patient, spouse        Kely Amadorard, PT, DPT   9/10/2017  [phone]

## 2017-09-10 NOTE — ASSESSMENT & PLAN NOTE
- Appropriate in the setting of VT aggravated by underlying AT/AFL (earlier during the admission). Device reprogrammed to VVI 80 this admission  -EP planning revision some time next week

## 2017-09-10 NOTE — PROGRESS NOTES
LOS: 54 days     24h events and S:  Suman Hayden is a 67 y.o. male with no events.     O:  Vitals:  Temp: 98.5 °F (36.9 °C) (09/10/17 1245)  Pulse: 79 (09/10/17 1245)  Resp: 16 (09/10/17 0830)  BP: (!) 90/0 (09/10/17 1245)  SpO2: 99 % (09/10/17 1245)    Ins/Outs:    Intake/Output Summary (Last 24 hours) at 09/10/17 1330  Last data filed at 09/10/17 0600   Gross per 24 hour   Intake             1160 ml   Output             1375 ml   Net             -215 ml       Physical Exam:  General: alert and oriented, conversant  Heart: hum of the VAD, no r/g appreciated  Lungs: CTAB  Abdomen: soft, NT, ND, +BS  Vascular: 2+ radial pulse, no edema    Medications:   amlodipine  10 mg Oral Daily    dronabinol  5 mg Oral BID    hydrALAZINE  50 mg Oral Q8H    lidocaine  1 patch Transdermal Q24H    magnesium oxide  800 mg Oral TID    pantoprazole  40 mg Oral Daily    pravastatin  20 mg Oral QHS    sodium chloride 0.9%  10 mL Intravenous Q6H      DOBUTamine 5 mcg/kg/min (09/08/17 2129)     albuterol sulfate, bisacodyl, ceFAZolin (ANCEF) IVPB, dextrose 50%, glucagon (human recombinant), hydrALAZINE, hydrocodone-acetaminophen 5-325mg, insulin aspart, ondansetron, Flushing PICC Protocol **AND** sodium chloride 0.9% **AND** sodium chloride 0.9%    Review of patient's allergies indicates:  No Known Allergies    Labs:  CBC with Diff:     Recent Labs  Lab 09/08/17  0020 09/08/17  0527 09/09/17  0424   WBC 7.25 7.15 7.16   HGB 7.1* 7.2* 7.0*   HCT 22.7* 22.7* 22.3*    237 243   LYMPH 16.7*  1.2 15.1*  1.1 17.3*  1.2   MONO 10.2  0.7 12.2  0.9 11.5  0.8   EOSINOPHIL 2.5 3.1 2.7       COAG:    Recent Labs  Lab 09/08/17  0020 09/08/17  0527 09/09/17  0424   APTT 37.9*  --   --    INR 3.6* 4.2* 4.3*       CMP:   Recent Labs  Lab 09/06/17  0409 09/07/17  0506 09/08/17  0020 09/08/17  0527 09/09/17  0424   GLU 53* 25* 61* 75 93   CALCIUM 8.2* 8.4* 8.4* 8.4* 8.2*   ALBUMIN 2.2*  --   --  2.3* 2.2*   PROT 6.6  --   --  6.7 6.6     144 143 144 143   K 4.2 4.5 4.4 4.9 5.1   CO2 25 22* 25 23 25   * 111* 110 112* 111*   BUN 22 22 20 20 19   CREATININE 1.2 1.3 1.5* 1.5* 1.5*   ALKPHOS 115  --   --  111 107   ALT 16  --   --  14 12   AST 23  --   --  19 19   BILITOT 1.2*  --   --  1.1* 1.1*   MG 1.7 1.7  --  1.8 2.1   PHOS 3.2 3.5  --  3.5 3.2     Estimated Creatinine Clearance: 46.2 mL/min (based on SCr of 1.5 mg/dL (H)).    .  Recent Labs  Lab 09/08/17  0527   BNP 1,808*         Diagnostic Results:  Ejection Fractions   EF   Date Value Ref Range Status   09/08/2017 10 (A) 55 - 65    09/01/2017 10 (A) 55 - 65    08/23/2017 20 (A) 55 - 65         Infectious disease labs:  Microbiology Results (last 7 days)     ** No results found for the last 168 hours. **          Assessment and Plan:  Suman Hayden is a 67 y.o. 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 He is known to have VT aggravated by underlying AT/AFL.       AICD present  - plan to switch LV lead and RV pace sense pin within the header  - might add pace sense lead but only if the aforementioned plan doesn't work  - revision likely this week     AF  - c/w warfarin     Jose R Almonte MD

## 2017-09-10 NOTE — PROGRESS NOTES
[facility]  Heart Transplant  Progress Note    Patient Name: Suman Hayden  MRN: [id number]  Admission Date: 7/18/2017  Hospital Length of Stay: 54 days  Attending Physician: Sunny Downing MD  Primary Care Provider: Joe Ernst MD  Principal Problem:LVAD (left ventricular assist device) present    Subjective:     Interval History: patient feeling well, working with PT/OT and eating better.  Wife at bedside: no new complaints.      Continuous Infusions:   DOBUTamine 5 mcg/kg/min (09/08/17 2129)     Scheduled Meds:   amlodipine  10 mg Oral Daily    dronabinol  5 mg Oral BID    hydrALAZINE  50 mg Oral Q8H    lidocaine  1 patch Transdermal Q24H    magnesium oxide  800 mg Oral TID    pantoprazole  40 mg Oral Daily    pravastatin  20 mg Oral QHS    sodium chloride 0.9%  10 mL Intravenous Q6H     PRN Meds:albuterol sulfate, bisacodyl, ceFAZolin (ANCEF) IVPB, dextrose 50%, glucagon (human recombinant), hydrALAZINE, hydrocodone-acetaminophen 5-325mg, insulin aspart, ondansetron, Flushing PICC Protocol **AND** sodium chloride 0.9% **AND** sodium chloride 0.9%    Review of patient's allergies indicates:  No Known Allergies  Objective:     Vital Signs (Most Recent):  Temp: 98.7 °F (37.1 °C) (09/10/17 0830)  Pulse: 81 (09/10/17 1000)  Resp: 16 (09/10/17 0830)  BP: (!) 90/0 (09/10/17 0830)  SpO2: (!) 93 % (09/10/17 0830) Vital Signs (24h Range):  Temp:  [98.3 °F (36.8 °C)-98.7 °F (37.1 °C)] 98.7 °F (37.1 °C)  Pulse:  [66-92] 81  Resp:  [16-18] 16  SpO2:  [91 %-98 %] 93 %  BP: ()/(0-59) 90/0     Weight: 77.5 kg (170 lb 13.7 oz)  Body mass index is 25.98 kg/m².      Intake/Output Summary (Last 24 hours) at 09/10/17 1156  Last data filed at 09/10/17 0600   Gross per 24 hour   Intake             1160 ml   Output             1375 ml   Net             -215 ml       Hemodynamic Parameters:       Physical Exam   Constitutional: He is oriented to person, place, and time. He appears well-developed and  well-nourished.   HENT:   Head: Normocephalic and atraumatic.   Eyes: EOM are normal. Pupils are equal, round, and reactive to light.   Neck: Normal range of motion. Neck supple. JVD present.   Cardiovascular: Normal rate and regular rhythm.    No murmur heard.  VAD hum smooth   Pulmonary/Chest: Effort normal and breath sounds normal.   Abdominal: Soft. Bowel sounds are normal. He exhibits no distension. There is no tenderness. There is no guarding.   Musculoskeletal: Normal range of motion. He exhibits no edema.   Neurological: He is alert and oriented to person, place, and time.   Skin: Skin is warm and dry.   Nursing note and vitals reviewed.      Significant Labs:  CBC:    Recent Labs  Lab 09/09/17 0424   WBC 7.16   RBC 2.51*   HGB 7.0*   HCT 22.3*      MCV 89   MCH 27.9   MCHC 31.4*     BNP:    Recent Labs  Lab 09/08/17  0527   BNP 1,808*     CMP:    Recent Labs  Lab 09/09/17 0424   GLU 93   CALCIUM 8.2*   ALBUMIN 2.2*   PROT 6.6      K 5.1   CO2 25   *   BUN 19   CREATININE 1.5*   ALKPHOS 107   ALT 12   AST 19   BILITOT 1.1*      Coagulation:     Recent Labs  Lab 09/08/17  0020  09/09/17 0424   INR 3.6*  < > 4.3*   APTT 37.9*  --   --    < > = values in this interval not displayed.  LDH:    Recent Labs  Lab 09/08/17 0527 09/09/17 0424   * 301*     Microbiology:  Microbiology Results (last 7 days)     ** No results found for the last 168 hours. **          I have reviewed all pertinent labs within the past 24 hours.    Estimated Creatinine Clearance: 46.2 mL/min (based on SCr of 1.5 mg/dL (H)).    Diagnostic Results:  I have reviewed and interpreted all pertinent imaging results/findings within the past 24 hours.    Assessment and Plan:     * LVAD (left ventricular assist device) present    -HeartMate 3 Implanted 7/27/2017 as DT   -s/p delayed chest closure (7/28/17) and extubated (7/29/17), reintubated 8/9/17 and extubated 8/13/17  -CTS Primary  -Implanted by   [place]  -Continue Coumadin, Goal INR 2.0-3.0. Hold coumadin tonight   -Antiplatelets : n/a  -LDH is stable overall today. Will continue to monitor daily.  -Speed set at 5200 rpm  -Interrogation notable for no events  -Not listed for OHTx           Bacteremia    - ESBL bacteremia.Cont ertapenem. Per ID- continue ertapenem x 4-6 weeks from 8/11. If discharged on ertapenem needs weekly cbc and cmp while on antibiotics (please fax results to ID clinic)  - blood cultures from 8/29 NGTD          Essential hypertension    -Patient is pulsatile  -MAP goal 60-80 mmHg  -Blood pressure uncontrolled over the last 24 hours  -Antihypertensive medications include Amlodipine and Hydralazine  Will monitor trends              V-tach    - Recommend resuming Amio, NSVT on tele        CHF (NYHA class IV, ACC/AHA stage D)    -NICM  -Last 2D Echo 9/1/2017: LVEF 10-15%, LVEDD 6.3 cm  -Current diuretic regimen: Furosemide IVP. Net even over the last 24 hours. Will I/Os not accurate. Diuretic regimen per primary.  -GDMT with n/a  -2g Na dietary restriction, 1500 mL fluid restriction, strict I/Os          Hyperlipidemia    -continue pravastatin        Hepatitis B core antibody positive since 2012              Stage III pressure ulcer of sacral region    - wound care        Atrial fibrillation    -AC, Amio per C TS          Hyperglycemia    -per primary team        Atrial tachycardia    - [id number] - 3  -Rec restarting Amio. AC per CTS        AICD discharge    - Appropriate in the setting of VT aggravated by underlying AT/AFL (earlier during the admission). Device reprogrammed to VVI 80 this admission  -EP planning revision some time next week               MELISSA Jon  Heart Transplant spectralink: [id number] on call [phone]  [facility]

## 2017-09-10 NOTE — SUBJECTIVE & OBJECTIVE
Interval History: patient feeling well, working with PT/OT and eating better.  Wife at bedside: no new complaints.      Continuous Infusions:   DOBUTamine 5 mcg/kg/min (09/08/17 2129)     Scheduled Meds:   amlodipine  10 mg Oral Daily    dronabinol  5 mg Oral BID    hydrALAZINE  50 mg Oral Q8H    lidocaine  1 patch Transdermal Q24H    magnesium oxide  800 mg Oral TID    pantoprazole  40 mg Oral Daily    pravastatin  20 mg Oral QHS    sodium chloride 0.9%  10 mL Intravenous Q6H     PRN Meds:albuterol sulfate, bisacodyl, ceFAZolin (ANCEF) IVPB, dextrose 50%, glucagon (human recombinant), hydrALAZINE, hydrocodone-acetaminophen 5-325mg, insulin aspart, ondansetron, Flushing PICC Protocol **AND** sodium chloride 0.9% **AND** sodium chloride 0.9%    Review of patient's allergies indicates:  No Known Allergies  Objective:     Vital Signs (Most Recent):  Temp: 98.7 °F (37.1 °C) (09/10/17 0830)  Pulse: 81 (09/10/17 1000)  Resp: 16 (09/10/17 0830)  BP: (!) 90/0 (09/10/17 0830)  SpO2: (!) 93 % (09/10/17 0830) Vital Signs (24h Range):  Temp:  [98.3 °F (36.8 °C)-98.7 °F (37.1 °C)] 98.7 °F (37.1 °C)  Pulse:  [66-92] 81  Resp:  [16-18] 16  SpO2:  [91 %-98 %] 93 %  BP: ()/(0-59) 90/0     Weight: 77.5 kg (170 lb 13.7 oz)  Body mass index is 25.98 kg/m².      Intake/Output Summary (Last 24 hours) at 09/10/17 1156  Last data filed at 09/10/17 0600   Gross per 24 hour   Intake             1160 ml   Output             1375 ml   Net             -215 ml       Hemodynamic Parameters:       Physical Exam   Constitutional: He is oriented to person, place, and time. He appears well-developed and well-nourished.   HENT:   Head: Normocephalic and atraumatic.   Eyes: EOM are normal. Pupils are equal, round, and reactive to light.   Neck: Normal range of motion. Neck supple. JVD present.   Cardiovascular: Normal rate and regular rhythm.    No murmur heard.  VAD hum smooth   Pulmonary/Chest: Effort normal and breath sounds normal.    Abdominal: Soft. Bowel sounds are normal. He exhibits no distension. There is no tenderness. There is no guarding.   Musculoskeletal: Normal range of motion. He exhibits no edema.   Neurological: He is alert and oriented to person, place, and time.   Skin: Skin is warm and dry.   Nursing note and vitals reviewed.      Significant Labs:  CBC:    Recent Labs  Lab 09/09/17 0424   WBC 7.16   RBC 2.51*   HGB 7.0*   HCT 22.3*      MCV 89   MCH 27.9   MCHC 31.4*     BNP:    Recent Labs  Lab 09/08/17 0527   BNP 1,808*     CMP:    Recent Labs  Lab 09/09/17 0424   GLU 93   CALCIUM 8.2*   ALBUMIN 2.2*   PROT 6.6      K 5.1   CO2 25   *   BUN 19   CREATININE 1.5*   ALKPHOS 107   ALT 12   AST 19   BILITOT 1.1*      Coagulation:     Recent Labs  Lab 09/08/17  0020  09/09/17 0424   INR 3.6*  < > 4.3*   APTT 37.9*  --   --    < > = values in this interval not displayed.  LDH:    Recent Labs  Lab 09/08/17 0527 09/09/17 0424   * 301*     Microbiology:  Microbiology Results (last 7 days)     ** No results found for the last 168 hours. **          I have reviewed all pertinent labs within the past 24 hours.    Estimated Creatinine Clearance: 46.2 mL/min (based on SCr of 1.5 mg/dL (H)).    Diagnostic Results:  I have reviewed and interpreted all pertinent imaging results/findings within the past 24 hours.

## 2017-09-10 NOTE — PLAN OF CARE
Problem: Physical Therapy Goal  Goal: Physical Therapy Goal  Goals to be met by: 10/9/17     Patient will increase functional independence with mobility by performin. Supine to sit with MInimal Assistance-  Met 2017  2. Sit to supine with MInimal Assistance - not met  3. Sit to stand transfer with Minimal Assistance- met       Updated: Sit to stand transfer with supervision using RW   4. Bed to chair transfer with Minimal Assistance- not met  5. Gait  x 50 feet with Minimal Assistance. -  Met 2017  6. Lower extremity exercise program x15 reps, with supervision, in order to increase LE strength and (I) with functional mobility. - not met  7.Pt mod I supine to sit- not met  8. Pt receive gait training ~ 220 ft with AD if needed and supervision- not met          Outcome: Ongoing (interventions implemented as appropriate)  Goals reviewed and remain appropriate. Pt progressing towards goals.    Kely Willett, PT, DPT   9/10/2017  [phone]

## 2017-09-10 NOTE — PLAN OF CARE
Problem: Patient Care Overview  Goal: Plan of Care Review  Plan of care discussed with patient. Patient is free of fall/trauma/injury. Denies CP, SOB, or pain/discomfort. All questions addressed. Will continue to monitor

## 2017-09-10 NOTE — PLAN OF CARE
Problem: Patient Care Overview  Goal: Plan of Care Review  Outcome: Ongoing (interventions implemented as appropriate)   gtt continues to infuse at MD ordered rate. Fall precautions reviewed with Pt, Pt remained free from falls/trauma/injury. VSS, LVAD hum smooth. VAD numbers WDL, no alarms noted over night. Denies any CP, SOB, palpitations, dizziness, pain and discomfort. Turning/repositioning independently in bed. Plan of care reviewed with patient and all questions answered, verbalizes understanding. No acute distress noted. Will continue to monitor.

## 2017-09-11 LAB
ALBUMIN SERPL BCP-MCNC: 2.2 G/DL
ALP SERPL-CCNC: 107 U/L
ALT SERPL W/O P-5'-P-CCNC: 12 U/L
ANION GAP SERPL CALC-SCNC: 4 MMOL/L
AST SERPL-CCNC: 16 U/L
BASOPHILS # BLD AUTO: 0.05 K/UL
BASOPHILS NFR BLD: 0.7 %
BILIRUB DIRECT SERPL-MCNC: 0.7 MG/DL
BILIRUB SERPL-MCNC: 1.1 MG/DL
BNP SERPL-MCNC: 2076 PG/ML
BUN SERPL-MCNC: 19 MG/DL
CALCIUM SERPL-MCNC: 8 MG/DL
CHLORIDE SERPL-SCNC: 113 MMOL/L
CO2 SERPL-SCNC: 27 MMOL/L
CREAT SERPL-MCNC: 1.3 MG/DL
CRP SERPL-MCNC: 14.5 MG/L
DIASTOLIC DYSFUNCTION: YES
DIFFERENTIAL METHOD: ABNORMAL
EOSINOPHIL # BLD AUTO: 0.3 K/UL
EOSINOPHIL NFR BLD: 3.6 %
ERYTHROCYTE [DISTWIDTH] IN BLOOD BY AUTOMATED COUNT: 17.4 %
EST. GFR  (AFRICAN AMERICAN): >60 ML/MIN/1.73 M^2
EST. GFR  (NON AFRICAN AMERICAN): 56.5 ML/MIN/1.73 M^2
GLUCOSE SERPL-MCNC: 90 MG/DL
HCT VFR BLD AUTO: 21.2 %
HGB BLD-MCNC: 6.7 G/DL
INR PPP: 3.3
LDH SERPL L TO P-CCNC: 244 U/L
LYMPHOCYTES # BLD AUTO: 1.4 K/UL
LYMPHOCYTES NFR BLD: 19.1 %
MAGNESIUM SERPL-MCNC: 2.4 MG/DL
MCH RBC QN AUTO: 27.3 PG
MCHC RBC AUTO-ENTMCNC: 31.6 G/DL
MCV RBC AUTO: 87 FL
MITRAL VALVE MOBILITY: NORMAL
MITRAL VALVE REGURGITATION: ABNORMAL
MONOCYTES # BLD AUTO: 0.6 K/UL
MONOCYTES NFR BLD: 7.4 %
NEUTROPHILS # BLD AUTO: 5.2 K/UL
NEUTROPHILS NFR BLD: 68.9 %
PHOSPHATE SERPL-MCNC: 2.7 MG/DL
PLATELET # BLD AUTO: 228 K/UL
PMV BLD AUTO: 10.8 FL
POTASSIUM SERPL-SCNC: 4.5 MMOL/L
PREALB SERPL-MCNC: 11 MG/DL
PROT SERPL-MCNC: 6.4 G/DL
PROTHROMBIN TIME: 32.9 SEC
RBC # BLD AUTO: 2.45 M/UL
RETIRED EF AND QEF - SEE NOTES: 30 (ref 55–65)
SODIUM SERPL-SCNC: 144 MMOL/L
TRICUSPID VALVE REGURGITATION: ABNORMAL
WBC # BLD AUTO: 7.52 K/UL

## 2017-09-11 PROCEDURE — 84134 ASSAY OF PREALBUMIN: CPT

## 2017-09-11 PROCEDURE — 93750 INTERROGATION VAD IN PERSON: CPT | Mod: ,,, | Performed by: INTERNAL MEDICINE

## 2017-09-11 PROCEDURE — 25000003 PHARM REV CODE 250: Performed by: NURSE PRACTITIONER

## 2017-09-11 PROCEDURE — 83735 ASSAY OF MAGNESIUM: CPT

## 2017-09-11 PROCEDURE — 99233 SBSQ HOSP IP/OBS HIGH 50: CPT | Mod: 24,,, | Performed by: THORACIC SURGERY (CARDIOTHORACIC VASCULAR SURGERY)

## 2017-09-11 PROCEDURE — 86140 C-REACTIVE PROTEIN: CPT

## 2017-09-11 PROCEDURE — 84100 ASSAY OF PHOSPHORUS: CPT

## 2017-09-11 PROCEDURE — A4216 STERILE WATER/SALINE, 10 ML: HCPCS | Performed by: THORACIC SURGERY (CARDIOTHORACIC VASCULAR SURGERY)

## 2017-09-11 PROCEDURE — 83615 LACTATE (LD) (LDH) ENZYME: CPT

## 2017-09-11 PROCEDURE — 97530 THERAPEUTIC ACTIVITIES: CPT

## 2017-09-11 PROCEDURE — 83880 ASSAY OF NATRIURETIC PEPTIDE: CPT

## 2017-09-11 PROCEDURE — 97116 GAIT TRAINING THERAPY: CPT

## 2017-09-11 PROCEDURE — 63600175 PHARM REV CODE 636 W HCPCS: Performed by: NURSE PRACTITIONER

## 2017-09-11 PROCEDURE — 27000248 HC VAD-ADDITIONAL DAY

## 2017-09-11 PROCEDURE — 93750 INTERROGATION VAD IN PERSON: CPT | Mod: ,,, | Performed by: THORACIC SURGERY (CARDIOTHORACIC VASCULAR SURGERY)

## 2017-09-11 PROCEDURE — 25000003 PHARM REV CODE 250: Performed by: THORACIC SURGERY (CARDIOTHORACIC VASCULAR SURGERY)

## 2017-09-11 PROCEDURE — 36415 COLL VENOUS BLD VENIPUNCTURE: CPT

## 2017-09-11 PROCEDURE — 97535 SELF CARE MNGMENT TRAINING: CPT

## 2017-09-11 PROCEDURE — 93750 INTERROGATION VAD IN PERSON: CPT | Performed by: STUDENT IN AN ORGANIZED HEALTH CARE EDUCATION/TRAINING PROGRAM

## 2017-09-11 PROCEDURE — 80076 HEPATIC FUNCTION PANEL: CPT

## 2017-09-11 PROCEDURE — 80048 BASIC METABOLIC PNL TOTAL CA: CPT

## 2017-09-11 PROCEDURE — 20600001 HC STEP DOWN PRIVATE ROOM

## 2017-09-11 PROCEDURE — 93306 TTE W/DOPPLER COMPLETE: CPT

## 2017-09-11 PROCEDURE — 93306 TTE W/DOPPLER COMPLETE: CPT | Mod: 26,,, | Performed by: INTERNAL MEDICINE

## 2017-09-11 PROCEDURE — 85025 COMPLETE CBC W/AUTO DIFF WBC: CPT

## 2017-09-11 PROCEDURE — 85610 PROTHROMBIN TIME: CPT

## 2017-09-11 PROCEDURE — 99232 SBSQ HOSP IP/OBS MODERATE 35: CPT | Mod: ,,, | Performed by: INTERNAL MEDICINE

## 2017-09-11 PROCEDURE — 99232 SBSQ HOSP IP/OBS MODERATE 35: CPT | Mod: GC,,, | Performed by: INTERNAL MEDICINE

## 2017-09-11 RX ORDER — WARFARIN 2.5 MG/1
2.5 TABLET ORAL DAILY
Status: DISCONTINUED | OUTPATIENT
Start: 2017-09-11 | End: 2017-09-14

## 2017-09-11 RX ORDER — DOBUTAMINE HYDROCHLORIDE 200 MG/100ML
2.5 INJECTION INTRAVENOUS CONTINUOUS
Status: DISCONTINUED | OUTPATIENT
Start: 2017-09-12 | End: 2017-09-22 | Stop reason: HOSPADM

## 2017-09-11 RX ADMIN — LIDOCAINE 1 PATCH: 50 PATCH TOPICAL at 12:09

## 2017-09-11 RX ADMIN — HYDRALAZINE HYDROCHLORIDE 50 MG: 50 TABLET ORAL at 05:09

## 2017-09-11 RX ADMIN — AMLODIPINE BESYLATE 10 MG: 10 TABLET ORAL at 08:09

## 2017-09-11 RX ADMIN — HYDRALAZINE HYDROCHLORIDE 50 MG: 50 TABLET ORAL at 01:09

## 2017-09-11 RX ADMIN — MAGNESIUM OXIDE TAB 400 MG (241.3 MG ELEMENTAL MG) 800 MG: 400 (241.3 MG) TAB at 01:09

## 2017-09-11 RX ADMIN — PANTOPRAZOLE SODIUM 40 MG: 40 TABLET, DELAYED RELEASE ORAL at 08:09

## 2017-09-11 RX ADMIN — Medication 10 ML: at 06:09

## 2017-09-11 RX ADMIN — Medication 10 ML: at 12:09

## 2017-09-11 RX ADMIN — PRAVASTATIN SODIUM 20 MG: 20 TABLET ORAL at 09:09

## 2017-09-11 RX ADMIN — WARFARIN SODIUM 2.5 MG: 2.5 TABLET ORAL at 06:09

## 2017-09-11 RX ADMIN — HYDRALAZINE HYDROCHLORIDE 50 MG: 50 TABLET ORAL at 09:09

## 2017-09-11 RX ADMIN — SODIUM CHLORIDE, PRESERVATIVE FREE 10 ML: 5 INJECTION INTRAVENOUS at 09:09

## 2017-09-11 RX ADMIN — Medication 10 ML: at 05:09

## 2017-09-11 RX ADMIN — MAGNESIUM OXIDE TAB 400 MG (241.3 MG ELEMENTAL MG) 800 MG: 400 (241.3 MG) TAB at 09:09

## 2017-09-11 RX ADMIN — DRONABINOL 5 MG: 2.5 CAPSULE ORAL at 09:09

## 2017-09-11 RX ADMIN — DRONABINOL 5 MG: 2.5 CAPSULE ORAL at 08:09

## 2017-09-11 RX ADMIN — MAGNESIUM OXIDE TAB 400 MG (241.3 MG ELEMENTAL MG) 800 MG: 400 (241.3 MG) TAB at 05:09

## 2017-09-11 NOTE — PT/OT/SLP PROGRESS
"Occupational Therapy  Treatment    Suman Hayden   MRN: [id number]   Admitting Diagnosis: LVAD (left ventricular assist device) present    OT Date of Treatment: 09/11/17   OT Start Time: 1025  OT Stop Time: 1052  OT Total Time (min): 27 min    Billable Minutes:  Self Care/Home Management 17 and Therapeutic Activity 10    General Precautions: Standard, LVAD, fall  Orthopedic Precautions: N/A  Braces: N/A    Subjective:  Communicated with RN prior to session.  "I didn't have a good yesterday."  Pain/Comfort  Pain Rating 1: 0/10  Pain Rating Post-Intervention 1: 0/10    Objective:  Patient found with: telemetry, PICC line (LVAD to battery power)     Functional Mobility:  Bed Mobility: NT as pt up in chair and left up in chair     Transfers:  Sit <> Stand Assistance: Moderate Assistance x 2 trials from bedside chair; max cues for technique and posterior lean in standing  Sit <> Stand Assistive Device: No Assistive Device    Activities of Daily Living:  UE Dressing Level of Assistance: Maximum assistance to don LVAD vest correctly; batteries in upside down initially; pt with controller placed horizontally on chest strap and reports LVAD coordinator assisted with this  LE Dressing Level of Assistance: Stand by assistance with increased time to don and tie tennis shoes, Maximum assistance to don pants and zip/tie while standing    Balance:   Static Sit: GOOD+: Takes MAXIMAL challenges from all directions.    Dynamic Sit: GOOD+: Maintains balance through MAXIMAL excursions of active trunk motion  Static Stand: POOR+: Needs MINIMAL assist to maintain  Dynamic stand: POOR: N/A    Therapeutic Activities and Exercises:  Pt up in chair on battery power upon OT entry; required assistance to don vest correctly and place batteries in vest; performed LB Dressing and required Mod A to stand from bedside chair; pt required max cues for technique and with no carry-over to next trial; posterior lean in standing and able to maintain ~2 min " "each trial before returning to seated position    AM-PAC 6 CLICK ADL   How much help from another person does this patient currently need?   1 = Unable, Total/Dependent Assistance  2 = A lot, Maximum/Moderate Assistance  3 = A little, Minimum/Contact Guard/Supervision  4 = None, Modified [place]/Independent    Putting on and taking off regular lower body clothing? : 2  Bathing (including washing, rinsing, drying)?: 3  Toileting, which includes using toilet, bedpan, or urinal? : 3  Putting on and taking off regular upper body clothing?: 3  Taking care of personal grooming such as brushing teeth?: 3  Eating meals?: 3  Total Score: 17     AM-PAC Raw Score CMS "G-Code Modifier Level of Impairment Assistance   6 % Total / Unable   7 - 8 CM 80 - 100% Maximal Assist   9-13 CL 60 - 80% Moderate Assist   14 - 19 CK 40 - 60% Moderate Assist   20 - 22 CJ 20 - 40% Minimal Assist   23 CI 1-20% SBA / CGA   24 CH 0% Independent/ Mod I       Patient left up in chair with all lines intact, call button in reach and wife present    ASSESSMENT:  Suman Hayden is a 67 y.o. male with a medical diagnosis of LVAD (left ventricular assist device) present and presents with deficits listed below. Pt making slow progress toward goals, requiring Mod A for T/Fs and Max A at times for LVAD management. Pt would continue to benefit from OT to increase independence and safety. Recommend rehab upon D/C as pt unsafe to return home at current functional level.    Rehab identified problem list/impairments: Rehab identified problem list/impairments: weakness, impaired endurance, impaired self care skills, impaired functional mobilty, impaired balance, decreased safety awareness, impaired cardiopulmonary response to activity    Rehab potential is good.    Activity tolerance: Good    Discharge recommendations: Discharge Facility/Level Of Care Needs: rehabilitation facility     Barriers to discharge: Barriers to Discharge: None    Equipment " recommendations: walker, rolling, bedside commode     GOALS:    Occupational Therapy Goals        Problem: Occupational Therapy Goal    Goal Priority Disciplines Outcome Interventions   Occupational Therapy Goal     OT, PT/OT Ongoing (interventions implemented as appropriate)    Description:  Goals to be met by:  2 weeks 9/25/17    Patient will increase functional independence with ADLs by performing:  Feeding: Independent   UE Dressing with Supervision.  LE Dressing with Supervision.  Grooming while standing with Supervision.  Toileting from toilet with Supervision for hygiene and clothing management.   Stand pivot transfers with Supervision.  Toilet transfer to toilet with Supervision.  Pt will be supervision with LVAD yasmin't.     Goals to be met by:  2 weeks 9/12/17    Patient will increase functional independence with ADLs by performing:  Feeding: Independent   UE Dressing with Supervision.  LE Dressing with Supervision.  Grooming while standing with Supervision.  Toileting from toilet with Supervision for hygiene and clothing management.   Stand pivot transfers with Supervision.  Toilet transfer to toilet with Supervision.  Pt will be supervision  with LVAD yasmin't.     Goals to be met by:  2 weeks 8/28/17    Patient will increase functional independence with ADLs by performing:  Feeding: Independent   UE Dressing with Supervision.  LE Dressing with Supervision.  Grooming while standing with Supervision.  Toileting from toilet with Supervision for hygiene and clothing management.   Stand pivot transfers with Supervision.  Toilet transfer to toilet with Supervision.  Pt will be supervision  with LVAD yasmin't.                    Multidisciplinary Problems (Resolved)        Problem: Occupational Therapy Goal    Goal Priority Disciplines Outcome Interventions   Occupational Therapy Goal   (Resolved)     OT, PT/OT  Error    Description:  Goals to be met by: 8/04/2017    Patient will increase functional  independence with ADLs by performing:    Increased functional strength to 5/5 for improved ADL performance.  Upper extremity exercise program and R LE ankle pumps  3x 10 reps per handout, with independence.                      Plan:  Patient to be seen 6 x/week to address the above listed problems via self-care/home management, therapeutic activities, therapeutic exercises  Plan of Care expires: 09/21/17  Plan of Care reviewed with: patient, spouse         DELMY Mohamud  09/11/2017

## 2017-09-11 NOTE — PROCEDURES
Patient up in chair aaox3 with wife at bedside. VAD interrogation completed this AM in the event changes needed to be made. Pt's wife expressed disappointment that we did not do any VAD education over the w/e with her and they were not allowed to go outside alone because they were not checked off yet. Mrs. Hayden was unable to tell us what the meaning of the yellow dimitry alarm was when asked. We explained to her that we were here Friday to do more education but could not get it all done b/c she was very upset over things in general and we let her vent 30-45 minutes. We educated  and Mrs Hayden the importance of going over the section in their manual that covers alarms and how it is not safe to be outside alone w/o proper knowledge about the alarms.  Will continue to monitor for further issues.     Pulsatile: Yes   VAD Sounds: HM3  Smooth  Problems / Issues / Alarms with VAD if any: None noted  HCT: 21.2   Modular Cable Connection Intact(no yellow exposed): YES and loosens and tightens easily.     VAD Interrogation:  TXP LVAD INTERROGATIONS 9/11/2017 9/11/2017 9/11/2017 9/11/2017 9/11/2017 9/10/2017 9/10/2017   Type HeartMate3 (No Data) HeartMate3 HeartMate3 HeartMate3 HeartMate3 HeartMate3   Flow 4.4 - 4.3 4.2 4.2 4.2 4.3   Speed 5200 - 5200 5200 5200 5200 5200   PI 3.8 - 3.5 3.9 3.7 3.5 3.8   Power ([place]) 3.6 - 3.6 3.6 3.5 3.6 3.5   LSL - - - 4800 4800 4800 -   Pulsatility - - - - - Intermittent pulse -   Some recent data might be hidden   }

## 2017-09-11 NOTE — PROGRESS NOTES
UPDATE    SW to pt's room for update. Pt presents as aaox3 with flat affect. Pt's wife at bedside. Pt's wife reports she is frustrated because pt is still having issues keeping food down. Pt's wife reports she is trying to find something pt can tolerate eating and she is hopeful issue will be resolved before pt is discharged. SW provided reflective listening and support. No needs voiced at this time. SW continuing to follow and remains available.

## 2017-09-11 NOTE — PROGRESS NOTES
Daily E and M and VAD Interrogation Note    Reason for Visit: HM III implant   Patient is seen in follow up for management            [x] Other - HM III     Interval History:  [] Interval history unobtainable due to intubation.  The [x] implant/[] explant date was 7/27/17     Events -  NAEON.  Eating more solid food and Boost.      Review of Systems:   Negative except as above.     Medications:  Current Facility-Administered Medications   Medication Dose Route Frequency Provider Last Rate Last Dose    albuterol nebulizer solution 2.5 mg  2.5 mg Nebulization Q4H PRN Shayne Espinoza MD        amlodipine tablet 10 mg  10 mg Oral Daily Geovanna Marques NP   10 mg at 09/10/17 0841    bisacodyl suppository 10 mg  10 mg Rectal Daily PRN Shyane Espinoza MD   10 mg at 09/01/17 0912    cefazolin (ANCEF) 2 gram in dextrose 5% 50 mL IVPB (premix)  2 g Intravenous On Call Procedure Jose R Almonte MD        dextrose 50% injection 12.5 g  12.5 g Intravenous PRN Charbel Ritter MD   12.5 g at 08/30/17 0449    DOBUtamine 1000 mg in D5W 250 mL infusion (premix non-titrating)  5 mcg/kg/min (Dosing Weight) Intravenous Continuous Sunny Downing MD 6 mL/hr at 09/10/17 1710 5 mcg/kg/min at 09/10/17 1710    dronabinol capsule 5 mg  5 mg Oral BID Geovanna Marques NP   5 mg at 09/10/17 0840    glucagon (human recombinant) injection 1 mg  1 mg Intramuscular PRN Charbel Ritter MD        hydrALAZINE injection 10 mg  10 mg Intravenous Q6H PRN Shlomo Serrato MD   10 mg at 09/05/17 0610    hydrALAZINE tablet 50 mg  50 mg Oral Q8H Geovanna Marques NP   50 mg at 09/10/17 1308    hydrocodone-acetaminophen 5-325mg per tablet 1 tablet  1 tablet Oral Q6H PRN Nadia Lucia NP   1 tablet at 09/06/17 1546    insulin aspart pen 0-5 Units  0-5 Units Subcutaneous Q4H PRN Charbel Ritter MD   2 Units at 08/10/17 0751    lidocaine 5 % patch 1 patch  1 patch Transdermal Q24H Geovanna Marques, NP   1 patch at 09/10/17 1257     magnesium oxide tablet 800 mg  800 mg Oral TID Geovanna Marques NP   800 mg at 09/10/17 1308    ondansetron injection 4 mg  4 mg Intravenous Q6H PRN Shayne Espinoza MD   4 mg at 09/08/17 0928    pantoprazole EC tablet 40 mg  40 mg Oral Daily Geovanna Marques NP   40 mg at 09/10/17 0840    pravastatin tablet 20 mg  20 mg Oral QHS Geovanna Marques NP   20 mg at 09/09/17 2056    sodium chloride 0.9% flush 10 mL  10 mL Intravenous Q6H Sunny Downing MD   10 mL at 09/10/17 1800    And    sodium chloride 0.9% flush 10 mL  10 mL Intravenous PRN Sunny Downing MD           Physical Examination:  Vital Signs:   Vitals:    09/10/17 1800   BP:    Pulse: 80   Resp:    Temp:      Cardiovascular:  [x] Regular rate and rhythm  []  Other  [x]  No edema []  Edema present  [x]  Clear to auscultation  VAD sounds smooth  Skin:  Incision is [x]  Clean, dry and intact.    Sternum:  [x]  Stable []  Unstable  Driveline(s):   [x]  Clean, dry and intact.     Labs:  CBC, BMP, LDH, INR reviewed    Procedure:  Device Interrogation including analysis of device parameters.  Current Settings   [x]  Ventricular Assist Device     Review of device function is [x]  Stable     TXP LVAD INTERROGATIONS 9/10/2017 9/10/2017 9/10/2017 9/10/2017 9/9/2017 9/9/2017 9/9/2017   Type HeartMate3 HeartMate3 HeartMate3 HeartMate3 HeartMate3 HeartMate3 HeartMate3   Flow 4.3 4.4 4.3 4.3 4.2 4.3 3.5   Speed 5200 5200 5200 5200 5200 5200 5200   PI 3.8 3.8 3.6 3.7 3.5 3.7 3.2   Power ([place]) 3.5 3.5 3.6 3.5 3.6 3.6 3.6   LSL - - - 4800 4800 - -   Pulsatility - - - Intermittent pulse - - -   Some recent data might be hidden       Assessment:  [x]  Primary Cardiomyopathy [x]  Congestive Heart Failure   []  Atrial Fibrillation []  Ventricular Tachycardia   []  Aftercare cardiac device [x]  Long term (current) use of anticoagulants   []  Ventilator-associated pneumonia []  Pneumonia viral, unspecified   []  Pneumonia, bacterial, unspecified []  Pneumonia, organism  unspecified   []  Hemorrhage of GI tract, unspecified    []  Nosebleed []  Driveline infection    [x]  Decubitus/sacral-improvement          Plan:  [x]  Interval history obtained from ICU attending team member during rounding today  [x]  VAD/MATT teaching performed with patient  [x]  Mobilization / Physical Therapy ongoing  [x]  Anticoagulation-Coumadin holding    D/C ertepenem due to elevated INR  HTN: Norvasc and hydralazine   Hypernatremia: resolved  Nutrition following   Added Marinnol   Wound care following  Continue inotropic support  Lasix holding   Regular diet   Will transfer to hospitals next week  EP has seen patient can revise AICD when CTS is ready   Discharge planning ongoing     Total time spent was 37 minutes.  Of which more than 50 percent of the care dominated counseling and coordinating care with different team members. The VAD was interrogated and all parameters were WNL and no significant findings were found in the history. All these findings are documented in the note above.      Date of Service: 09/07/2017

## 2017-09-11 NOTE — PLAN OF CARE
Problem: Occupational Therapy Goal  Goal: Occupational Therapy Goal  Goals to be met by:  2 weeks 9/25/17    Patient will increase functional independence with ADLs by performing:  Feeding: Independent   UE Dressing with Supervision.  LE Dressing with Supervision.  Grooming while standing with Supervision.  Toileting from toilet with Supervision for hygiene and clothing management.   Stand pivot transfers with Supervision.  Toilet transfer to toilet with Supervision.  Pt will be supervision with LVAD yasmin't.     Goals to be met by:  2 weeks 9/12/17    Patient will increase functional independence with ADLs by performing:  Feeding: Independent   UE Dressing with Supervision.  LE Dressing with Supervision.  Grooming while standing with Supervision.  Toileting from toilet with Supervision for hygiene and clothing management.   Stand pivot transfers with Supervision.  Toilet transfer to toilet with Supervision.  Pt will be supervision  with LVAD yasmin't.     Goals to be met by:  2 weeks 8/28/17    Patient will increase functional independence with ADLs by performing:  Feeding: Independent   UE Dressing with Supervision.  LE Dressing with Supervision.  Grooming while standing with Supervision.  Toileting from toilet with Supervision for hygiene and clothing management.   Stand pivot transfers with Supervision.  Toilet transfer to toilet with Supervision.  Pt will be supervision  with LVAD yasmin't.        Outcome: Ongoing (interventions implemented as appropriate)  Continue OT plan of care.

## 2017-09-11 NOTE — PROGRESS NOTES
LOS: 55 days     24h events and S:  Suman Hayden is a 67 y.o. male paced at 79. No acute events.     O:  Vitals:  Temp: 98.3 °F (36.8 °C) (09/11/17 1100)  Pulse: 81 (09/11/17 1100)  Resp: 18 (09/11/17 1100)  BP: (!) 70/0 (09/11/17 1100)  SpO2: 97 % (09/11/17 1100)    Ins/Outs:    Intake/Output Summary (Last 24 hours) at 09/11/17 1305  Last data filed at 09/10/17 2000   Gross per 24 hour   Intake              960 ml   Output              650 ml   Net              310 ml       Physical Exam:  General: alert and oriented, conversant  Heart: hum of the VAD, no r/g appreciated  Lungs: CTAB  Abdomen: soft, NT, ND, +BS  Vascular: 2+ radial pulse, no edema    Medications:   amlodipine  10 mg Oral Daily    dronabinol  5 mg Oral BID    hydrALAZINE  50 mg Oral Q8H    lidocaine  1 patch Transdermal Q24H    magnesium oxide  800 mg Oral TID    pantoprazole  40 mg Oral Daily    pravastatin  20 mg Oral QHS    sodium chloride 0.9%  10 mL Intravenous Q6H    warfarin  2.5 mg Oral Daily      DOBUTamine 5 mcg/kg/min (09/10/17 1710)     albuterol sulfate, bisacodyl, ceFAZolin (ANCEF) IVPB, dextrose 50%, glucagon (human recombinant), hydrALAZINE, hydrocodone-acetaminophen 5-325mg, insulin aspart, ondansetron, Flushing PICC Protocol **AND** sodium chloride 0.9% **AND** sodium chloride 0.9%    Review of patient's allergies indicates:  No Known Allergies    Labs:  CBC with Diff:     Recent Labs  Lab 09/09/17  0424 09/10/17  1500 09/11/17  0522   WBC 7.16 7.44 7.52   HGB 7.0* 7.2* 6.7*   HCT 22.3* 23.1* 21.2*    250 228   LYMPH 17.3*  1.2 17.2*  1.3 19.1  1.4   MONO 11.5  0.8 10.1  0.8 7.4  0.6   EOSINOPHIL 2.7 3.1 3.6       COAG:    Recent Labs  Lab 09/08/17  0020  09/09/17  0424 09/10/17  1500 09/11/17  0522   APTT 37.9*  --   --   --   --    INR 3.6*  < > 4.3* 3.9* 3.3*   < > = values in this interval not displayed.    CMP:   Recent Labs  Lab 09/09/17  0424 09/10/17  1500 09/11/17  0522 09/11/17  1004   GLU 93 107   --  90   CALCIUM 8.2* 8.3*  --  8.0*   ALBUMIN 2.2* 2.3* 2.2*  --    PROT 6.6 7.0 6.4  --     144  --  144   K 5.1 4.5  --  4.5   CO2 25 26  --  27   * 112*  --  113*   BUN 19 21  --  19   CREATININE 1.5* 1.4  --  1.3   ALKPHOS 107 119 107  --    ALT 12 13 12  --    AST 19 18 16  --    BILITOT 1.1* 1.1* 1.1*  --    MG 2.1 2.4 2.4  --    PHOS 3.2 3.0 2.7  --      Estimated Creatinine Clearance: 53.3 mL/min (based on SCr of 1.3 mg/dL).    .  Recent Labs  Lab 09/11/17  0522   BNP 2,076*         Diagnostic Results:  Ejection Fractions   EF   Date Value Ref Range Status   09/11/2017 30 (A) 55 - 65    09/08/2017 10 (A) 55 - 65    09/01/2017 10 (A) 55 - 65         Infectious disease labs:  Microbiology Results (last 7 days)     ** No results found for the last 168 hours. **          Assessment and Plan:  Suman Hayden is a 67 y.o. 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 He is known to have VT aggravated by underlying AT/AFL.       AICD present  - plan to switch LV lead and RV pace sense pin within the header  - might add pace sense lead but only if the aforementioned plan doesn't work  - revision timing per Dr. Downing: to be done as outpatient  - recommend LifeVest in the meantime  - will sign off     AF  - c/w warfarin     Jose R Almonte MD

## 2017-09-11 NOTE — PLAN OF CARE
Problem: Physical Therapy Goal  Goal: Physical Therapy Goal  Goals to be met by: 10/9/17     Patient will increase functional independence with mobility by performin. Supine to sit with MInimal Assistance-  Met 2017  2. Sit to supine with MInimal Assistance - not met  3. Sit to stand transfer with Minimal Assistance- met       Updated: Sit to stand transfer with supervision using RW   4. Bed to chair transfer with Minimal Assistance- not met  5. Gait  x 50 feet with Minimal Assistance. -  Met 2017  6. Lower extremity exercise program x15 reps, with supervision, in order to increase LE strength and (I) with functional mobility. - not met  7.Pt mod I supine to sit- not met  8. Pt receive gait training ~ 220 ft with AD if needed and supervision- not met            Goals remain appropriate. Astrid Whaley, PT 2017

## 2017-09-11 NOTE — ASSESSMENT & PLAN NOTE
- ESBL bacteremia.Cont ertapenem. Per ID- continue ertapenem x 4-6 weeks from 8/11. Coarse completed   - blood cultures from 8/29 NGTD

## 2017-09-11 NOTE — ASSESSMENT & PLAN NOTE
-Patient is pulsatile  -MAP goal 60-80 mmHg  -Blood pressure controlled over the last 24 hours  -Antihypertensive medications include Amlodipine and Hydralazine  Will monitor trends

## 2017-09-11 NOTE — SUBJECTIVE & OBJECTIVE
Interval History: Patient wants ICD revision done before he goes home.  Wife is anxious to get checked off today.  PT/OT still recommending rehab placement. Encouraged incentive spirometry.      Continuous Infusions:   DOBUTamine 5 mcg/kg/min (09/10/17 1710)     Scheduled Meds:   amlodipine  10 mg Oral Daily    dronabinol  5 mg Oral BID    hydrALAZINE  50 mg Oral Q8H    lidocaine  1 patch Transdermal Q24H    magnesium oxide  800 mg Oral TID    pantoprazole  40 mg Oral Daily    pravastatin  20 mg Oral QHS    sodium chloride 0.9%  10 mL Intravenous Q6H    warfarin  2.5 mg Oral Daily     PRN Meds:albuterol sulfate, bisacodyl, ceFAZolin (ANCEF) IVPB, dextrose 50%, glucagon (human recombinant), hydrALAZINE, hydrocodone-acetaminophen 5-325mg, insulin aspart, ondansetron, Flushing PICC Protocol **AND** sodium chloride 0.9% **AND** sodium chloride 0.9%    Review of patient's allergies indicates:  No Known Allergies  Objective:     Vital Signs (Most Recent):  Temp: 98.3 °F (36.8 °C) (09/11/17 1100)  Pulse: 81 (09/11/17 1100)  Resp: 18 (09/11/17 1100)  BP: (!) 70/0 (09/11/17 1100)  SpO2: 97 % (09/11/17 1100) Vital Signs (24h Range):  Temp:  [98.1 °F (36.7 °C)-98.6 °F (37 °C)] 98.3 °F (36.8 °C)  Pulse:  [71-82] 81  Resp:  [16-18] 18  SpO2:  [93 %-97 %] 97 %  BP: ()/(0-70) 70/0     Weight: 78.1 kg (172 lb 2.9 oz)  Body mass index is 26.18 kg/m².      Intake/Output Summary (Last 24 hours) at 09/11/17 1404  Last data filed at 09/10/17 2000   Gross per 24 hour   Intake              480 ml   Output              650 ml   Net             -170 ml       Hemodynamic Parameters:       Physical Exam   Constitutional: He is oriented to person, place, and time. He appears well-developed and well-nourished.   HENT:   Head: Normocephalic and atraumatic.   Eyes: EOM are normal. Pupils are equal, round, and reactive to light.   Neck: Normal range of motion. Neck supple. JVD present.   Cardiovascular: Normal rate and regular  rhythm.    No murmur heard.  VAD hum smooth   Pulmonary/Chest: Effort normal and breath sounds normal.   Abdominal: Soft. Bowel sounds are normal. He exhibits no distension. There is no tenderness. There is no guarding.   Musculoskeletal: Normal range of motion. He exhibits no edema.   Neurological: He is alert and oriented to person, place, and time.   Skin: Skin is warm and dry.   Nursing note and vitals reviewed.      Significant Labs:  CBC:    Recent Labs  Lab 09/11/17 0522   WBC 7.52   RBC 2.45*   HGB 6.7*   HCT 21.2*      MCV 87   MCH 27.3   MCHC 31.6*     BNP:    Recent Labs  Lab 09/11/17 0522   BNP 2,076*     CMP:    Recent Labs  Lab 09/11/17 0522 09/11/17  1004   GLU  --  90   CALCIUM  --  8.0*   ALBUMIN 2.2*  --    PROT 6.4  --    NA  --  144   K  --  4.5   CO2  --  27   CL  --  113*   BUN  --  19   CREATININE  --  1.3   ALKPHOS 107  --    ALT 12  --    AST 16  --    BILITOT 1.1*  --       Coagulation:     Recent Labs  Lab 09/11/17 0522   INR 3.3*     LDH:    Recent Labs  Lab 09/09/17  0424 09/10/17  1500 09/11/17 0522   * 284* 244     Microbiology:  Microbiology Results (last 7 days)     ** No results found for the last 168 hours. **          I have reviewed all pertinent labs within the past 24 hours.    Estimated Creatinine Clearance: 53.3 mL/min (based on SCr of 1.3 mg/dL).    Diagnostic Results:  I have reviewed and interpreted all pertinent imaging results/findings within the past 24 hours.

## 2017-09-11 NOTE — PROGRESS NOTES
[facility]  Heart Transplant  Progress Note    Patient Name: Suman Hayden  MRN: [id number]  Admission Date: 7/18/2017  Hospital Length of Stay: 55 days  Attending Physician: Sunny Downing MD  Primary Care Provider: Joe Ernst MD  Principal Problem:LVAD (left ventricular assist device) present    Subjective:     Interval History: Patient wants ICD revision done before he goes home.  Wife is anxious to get checked off today.  PT/OT still recommending rehab placement. Encouraged incentive spirometry.      Continuous Infusions:   DOBUTamine 5 mcg/kg/min (09/10/17 1710)     Scheduled Meds:   amlodipine  10 mg Oral Daily    dronabinol  5 mg Oral BID    hydrALAZINE  50 mg Oral Q8H    lidocaine  1 patch Transdermal Q24H    magnesium oxide  800 mg Oral TID    pantoprazole  40 mg Oral Daily    pravastatin  20 mg Oral QHS    sodium chloride 0.9%  10 mL Intravenous Q6H    warfarin  2.5 mg Oral Daily     PRN Meds:albuterol sulfate, bisacodyl, ceFAZolin (ANCEF) IVPB, dextrose 50%, glucagon (human recombinant), hydrALAZINE, hydrocodone-acetaminophen 5-325mg, insulin aspart, ondansetron, Flushing PICC Protocol **AND** sodium chloride 0.9% **AND** sodium chloride 0.9%    Review of patient's allergies indicates:  No Known Allergies  Objective:     Vital Signs (Most Recent):  Temp: 98.3 °F (36.8 °C) (09/11/17 1100)  Pulse: 81 (09/11/17 1100)  Resp: 18 (09/11/17 1100)  BP: (!) 70/0 (09/11/17 1100)  SpO2: 97 % (09/11/17 1100) Vital Signs (24h Range):  Temp:  [98.1 °F (36.7 °C)-98.6 °F (37 °C)] 98.3 °F (36.8 °C)  Pulse:  [71-82] 81  Resp:  [16-18] 18  SpO2:  [93 %-97 %] 97 %  BP: ()/(0-70) 70/0     Weight: 78.1 kg (172 lb 2.9 oz)  Body mass index is 26.18 kg/m².      Intake/Output Summary (Last 24 hours) at 09/11/17 1404  Last data filed at 09/10/17 2000   Gross per 24 hour   Intake              480 ml   Output              650 ml   Net             -170 ml       Hemodynamic Parameters:        Physical Exam   Constitutional: He is oriented to person, place, and time. He appears well-developed and well-nourished.   HENT:   Head: Normocephalic and atraumatic.   Eyes: EOM are normal. Pupils are equal, round, and reactive to light.   Neck: Normal range of motion. Neck supple. JVD present.   Cardiovascular: Normal rate and regular rhythm.    No murmur heard.  VAD hum smooth   Pulmonary/Chest: Effort normal and breath sounds normal.   Abdominal: Soft. Bowel sounds are normal. He exhibits no distension. There is no tenderness. There is no guarding.   Musculoskeletal: Normal range of motion. He exhibits no edema.   Neurological: He is alert and oriented to person, place, and time.   Skin: Skin is warm and dry.   Nursing note and vitals reviewed.      Significant Labs:  CBC:    Recent Labs  Lab 09/11/17 0522   WBC 7.52   RBC 2.45*   HGB 6.7*   HCT 21.2*      MCV 87   MCH 27.3   MCHC 31.6*     BNP:    Recent Labs  Lab 09/11/17 0522   BNP 2,076*     CMP:    Recent Labs  Lab 09/11/17 0522 09/11/17  1004   GLU  --  90   CALCIUM  --  8.0*   ALBUMIN 2.2*  --    PROT 6.4  --    NA  --  144   K  --  4.5   CO2  --  27   CL  --  113*   BUN  --  19   CREATININE  --  1.3   ALKPHOS 107  --    ALT 12  --    AST 16  --    BILITOT 1.1*  --       Coagulation:     Recent Labs  Lab 09/11/17 0522   INR 3.3*     LDH:    Recent Labs  Lab 09/09/17  0424 09/10/17  1500 09/11/17 0522   * 284* 244     Microbiology:  Microbiology Results (last 7 days)     ** No results found for the last 168 hours. **          I have reviewed all pertinent labs within the past 24 hours.    Estimated Creatinine Clearance: 53.3 mL/min (based on SCr of 1.3 mg/dL).    Diagnostic Results:  I have reviewed and interpreted all pertinent imaging results/findings within the past 24 hours.    Assessment and Plan:     * LVAD (left ventricular assist device) present    -HeartMate 3 Implanted 7/27/2017 as DT   -s/p delayed chest closure (7/28/17)  and extubated (7/29/17), reintubated 8/9/17 and extubated 8/13/17  -CTS Primary  -Implanted by Dr. Downing  -Continue Coumadin, Goal INR 2.0-3.0. Hold coumadin tonight   -Antiplatelets : n/a  -LDH is stable overall today. Will continue to monitor daily.  -Speed set at 5200 rpm  -Interrogation notable for no events  -Not listed for OHTx           Bacteremia    - ESBL bacteremia.Cont ertapenem. Per ID- continue ertapenem x 4-6 weeks from 8/11. If discharged on ertapenem needs weekly cbc and cmp while on antibiotics (please fax results to ID clinic)  - blood cultures from 8/29 NGTD          Essential hypertension    -Patient is pulsatile  -MAP goal 60-80 mmHg  -Blood pressure uncontrolled over the last 24 hours  -Antihypertensive medications include Amlodipine and Hydralazine  Will monitor trends              V-tach    - Recommend resuming Amio, NSVT on tele        CHF (NYHA class IV, ACC/AHA stage D)    -NICM  -Last 2D Echo 9/1/2017: LVEF 10-15%, LVEDD 6.3 cm  -Current diuretic regimen: Furosemide IVP. Net even over the last 24 hours. Will I/Os not accurate. Diuretic regimen per primary.  -GDMT with n/a  -2g Na dietary restriction, 1500 mL fluid restriction, strict I/Os          Hyperlipidemia    -continue pravastatin        Hepatitis B core antibody positive since 2012              Stage III pressure ulcer of sacral region    - wound care        Atrial fibrillation    -AC, Amio per C TS          Hyperglycemia    -per primary team        Atrial tachycardia    - [id number] - 3  -Rec restarting Amio. AC per CTS        AICD discharge    - Appropriate in the setting of VT aggravated by underlying AT/AFL (earlier during the admission). Device reprogrammed to VVI 80 this admission  -EP planning revision some time next week               MELISSA Jon  Heart Transplant spectralink: [id number]  [facility]

## 2017-09-11 NOTE — PT/OT/SLP PROGRESS
Physical Therapy  Treatment    Suman Hayden   MRN: [id number]   Admitting Diagnosis: LVAD (left ventricular assist device) present    PT Received On: 09/11/17  PT Start Time: 1434     PT Stop Time: 1512    PT Total Time (min): 38 min       Billable Minutes:  Gait [id number] min    Treatment Type: Treatment  PT/PTA: PT     PTA Visit Number: 0       General Precautions: Standard, LVAD, fall  Orthopedic Precautions: N/A   Braces:      Do you have any cultural, spiritual, Hinduism conflicts, given your current situation?: none noted     Subjective:  Communicated with nurse prior to session.      Pain/Comfort  Pain Rating 1: 0/10  Pain Rating Post-Intervention 1: 0/10    Objective:   Patient found with: PICC line, telemetry (LVAD)    Functional Mobility:  Bed Mobility:   Sit to Supine: Supervision    Transfers:  Sit <> Stand Assistance: Minimum Assistance (min to mod assist. )  Sit <> Stand Assistive Device: Rolling Walker    Gait:   Gait Distance: 18 ft, 24, ft 76 ft, 56 ft 70 ft, 50 ft, 68 ft, 130 ft( 492 ft total) with emergency bag present and sitting rest periods between distance ambulated.   Assistance 1: Total assistance (CGA with gait training and assist of 1 for equipment. )  Gait Assistive Device: Rolling walker  Gait Pattern: 3-point gait    AM-PAC 6 CLICK MOBILITY  How much help from another person does this patient currently need?   1 = Unable, Total/Dependent Assistance  2 = A lot, Maximum/Moderate Assistance  3 = A little, Minimum/Contact Guard/Supervision  4 = None, Modified Swain/Independent    Turning over in bed (including adjusting bedclothes, sheets and blankets)?: 3  Sitting down on and standing up from a chair with arms (e.g., wheelchair, bedside commode, etc.): 3  Moving from lying on back to sitting on the side of the bed?: 3  Moving to and from a bed to a chair (including a wheelchair)?: 3  Need to walk in hospital room?: 2  Climbing 3-5 steps with a railing?: 1  Total Score: 15    AM-PAC  Raw Score CMS G-Code Modifier Level of Impairment Assistance   6 % Total / Unable   7 - 9 CM 80 - 100% Maximal Assist   10 - 14 CL 60 - 80% Moderate Assist   15 - 19 CK 40 - 60% Moderate Assist   20 - 22 CJ 20 - 40% Minimal Assist   23 CI 1-20% SBA / CGA   24 CH 0% Independent/ Mod I     Patient left supine with all lines intact, call button in reach and wife. present.    Assessment:  Suman Hayden is a 67 y.o. male with a medical diagnosis of LVAD (left ventricular assist device) present and presents with decreased strength, mobility, transfers and decreased distance ambulated. Pt would benefit from cont PT to address deficits and would benefit from inpt rehab when medically stable. Pt will benefit from skilled PT 6x/wk to return pt to highest functional level possible.  Pt is s/p  s/p  LVAD HM 3 placement , sternal washout , chest closure. . Pt was re-intubated  and extubated 17.    Rehab identified problem list/impairments: Rehab identified problem list/impairments: weakness, impaired functional mobilty, gait instability, impaired balance, decreased lower extremity function    Rehab potential is good.    Activity tolerance: Good    Discharge recommendations: Discharge Facility/Level Of Care Needs: rehabilitation facility     Barriers to discharge: Barriers to Discharge: None    Equipment recommendations: Equipment Needed After Discharge: bedside commode, walker, rolling     GOALS:    Physical Therapy Goals        Problem: Physical Therapy Goal    Goal Priority Disciplines Outcome Goal Variances Interventions   Physical Therapy Goal     PT/OT, PT Ongoing (interventions implemented as appropriate)     Description:  Goals to be met by: 10/9/17     Patient will increase functional independence with mobility by performin. Supine to sit with MInimal Assistance-  Met 2017  2. Sit to supine with MInimal Assistance - not met  3. Sit to stand transfer with Minimal Assistance- met        Updated: Sit to stand transfer with supervision using RW   4. Bed to chair transfer with Minimal Assistance- not met  5. Gait  x 50 feet with Minimal Assistance. -  Met 8/29/2017  6. Lower extremity exercise program x15 reps, with supervision, in order to increase LE strength and (I) with functional mobility. - not met  7.Pt mod I supine to sit- not met  8. Pt receive gait training ~ 220 ft with AD if needed and supervision- not met                             PLAN:    Patient to be seen 6 x/week  to address the above listed problems via gait training, therapeutic activities, therapeutic exercises  Plan of Care expires: 10/09/17  Plan of Care reviewed with: patient, spouse         Astrid MONTANO Jovana, PT  09/11/2017

## 2017-09-11 NOTE — PLAN OF CARE
Problem: Patient Care Overview  Goal: Plan of Care Review  Plan of care discussed with patient. Patient is free of fall/trauma/injury. Denies CP, SOB, or pain/discomfort. H and H  6.7 and 21.2. NP aware. Working on getting checked off on alarms. Pt and wife watching video. Dobutamine infusing. All questions addressed. Will continue to monitor

## 2017-09-11 NOTE — PROGRESS NOTES
Daily E and M and VAD Interrogation Note    Reason for Visit:  Post op VAD rounds/surgery  Patient is seen in follow up for management               - HM III     Interval History:   The [x] implant  date was 7/27/17     Events -  NAEON.  Eating more solid food and Boost.      Review of Systems:   Negative except as above.              Medications:  Current Facility-Administered Medications   Medication Dose Route Frequency Provider Last Rate Last Dose    albuterol nebulizer solution 2.5 mg  2.5 mg Nebulization Q4H PRN Shayne Espinoza MD        amlodipine tablet 10 mg  10 mg Oral Daily Geovanna Marques NP   10 mg at 09/11/17 0857    bisacodyl suppository 10 mg  10 mg Rectal Daily PRN Shayne Espinoza MD   10 mg at 09/01/17 0912    cefazolin (ANCEF) 2 gram in dextrose 5% 50 mL IVPB (premix)  2 g Intravenous On Call Procedure Jose R Almonte MD        dextrose 50% injection 12.5 g  12.5 g Intravenous PRN Charbel Ritter MD   12.5 g at 08/30/17 0449    DOBUtamine 1000 mg in D5W 250 mL infusion (premix non-titrating)  5 mcg/kg/min (Dosing Weight) Intravenous Continuous Sunny Downing MD 6 mL/hr at 09/10/17 1710 5 mcg/kg/min at 09/10/17 1710    dronabinol capsule 5 mg  5 mg Oral BID Geovanna Marques NP   5 mg at 09/11/17 0857    glucagon (human recombinant) injection 1 mg  1 mg Intramuscular PRN Charbel Ritter MD        hydrALAZINE injection 10 mg  10 mg Intravenous Q6H PRN Shlomo Serrato MD   10 mg at 09/05/17 0610    hydrALAZINE tablet 50 mg  50 mg Oral Q8H Geovanna Marques NP   50 mg at 09/11/17 0544    hydrocodone-acetaminophen 5-325mg per tablet 1 tablet  1 tablet Oral Q6H PRN Nadia Lucia NP   1 tablet at 09/06/17 1546    insulin aspart pen 0-5 Units  0-5 Units Subcutaneous Q4H PRN Charbel Ritter MD   2 Units at 08/10/17 0751    lidocaine 5 % patch 1 patch  1 patch Transdermal Q24H Geovanna Marques NP   1 patch at 09/10/17 1257    magnesium oxide tablet 800 mg  800 mg Oral TID  Geovanna Marques NP   800 mg at 09/11/17 0544    ondansetron injection 4 mg  4 mg Intravenous Q6H PRN Shayne Espinoza MD   4 mg at 09/08/17 0928    pantoprazole EC tablet 40 mg  40 mg Oral Daily Geovanna Marques NP   40 mg at 09/11/17 0857    pravastatin tablet 20 mg  20 mg Oral QHS Geovanna Marques NP   20 mg at 09/10/17 2130    sodium chloride 0.9% flush 10 mL  10 mL Intravenous Q6H Sunny Downing MD   10 mL at 09/11/17 0549    And    sodium chloride 0.9% flush 10 mL  10 mL Intravenous PRN Sunny Downing MD        warfarin (COUMADIN) tablet 2.5 mg  2.5 mg Oral Daily Nadia Lucia NP           Physical Examination:  Vital Signs:   Vitals:    09/11/17 1100   BP:    Pulse: 81   Resp: 18   Temp: 98.3 °F (36.8 °C)     Cardiovascular:  [x] Regular rate and rhythm [] Irregular []  None (MATT) []  Other  [x]  No edema []  Edema present  [x]  Clear to auscultation  VAD sounds smooth  Skin:  Incision is [x]  Clean, dry and intact.    Sternum:  [x]  Stable   Driveline(s):   [x]  Clean, dry and intact.       Labs:  CBC, BMP, LDH, INR    Procedure:  Device Interrogation including analysis of device parameters.  Current Settings   [x]  Ventricular Assist Device    Review of device function is [x]  Stable     TXP LVAD INTERROGATIONS 9/11/2017 9/11/2017 9/11/2017 9/11/2017 9/10/2017 9/10/2017 9/10/2017   Type (No Data) HeartMate3 HeartMate3 HeartMate3 HeartMate3 HeartMate3 HeartMate3   Flow - 4.3 4.2 4.2 4.2 4.3 4.4   Speed - 5200 5200 5200 5200 5200 5200   PI - 3.5 3.9 3.7 3.5 3.8 3.8   Power ([place]) - 3.6 3.6 3.5 3.6 3.5 3.5   LSL - - 4800 4800 4800 - -   Pulsatility - - - - Intermittent pulse - -   Some recent data might be hidden       Assessment:  [x]  Primary Cardiomyopathy [x]  Congestive Heart Failure   []  Atrial Fibrillation []  Ventricular Tachycardia   []  Aftercare cardiac device [x]  Long term (current) use of anticoagulants   []  Ventilator-associated pneumonia []  Pneumonia viral, unspecified   []   Pneumonia, bacterial, unspecified []  Pneumonia, organism unspecified   []  Hemorrhage of GI tract, unspecified    []  Nosebleed              Plan:  [x]  Interval history obtained from ICU attending team member during rounding today  [x]  VAD/MATT teaching performed with patient  [x]  Mobilization / Physical Therapy ongoing  [x]  Anticoagulation [x]  Ongoing []  Held        Total time spent was 37 minutes.  Of which more than 50 percent of the care dominated counseling and coordinating care with different team members. The VAD was interrogated and all parameters were WNL and no significant findings were found in the history. All these findings are documented in the note above.      Date of Service: 09/11/2017         Cardiac Surgery Attending E and M (VAD) Note along with VAD Interrogation    I have seen and examined the patient and agree with the findings above    I also reviewed the patients clinical course and:  [x]  Hemodynamic & Respiratory parameters  [x]  Laboratory Data  [x]  Radiological studies     VAD Interrogated [x]      VAD Function is normal. Changes made []  None [x]      Interrogation of Ventricular assist device was performed with physician analysis of device parameters and review of device function. I have personally reviewed the interrogation findings and agree with findings as stated.

## 2017-09-11 NOTE — ASSESSMENT & PLAN NOTE
-HeartMate 3 Implanted 7/27/2017 as DT   -s/p delayed chest closure (7/28/17) and extubated (7/29/17), reintubated 8/9/17 and extubated 8/13/17  -CTS Primary  -Implanted by Dr. Downing  -Continue Coumadin, Goal INR 2.0-3.0. Has been on hold the last 2 nights   -Antiplatelets : n/a  -LDH is stable overall today. Will continue to monitor daily.  -Speed set at 5200 rpm  -Interrogation notable for no events  -Not listed for OHTx

## 2017-09-12 LAB
ABO + RH BLD: NORMAL
ANION GAP SERPL CALC-SCNC: 6 MMOL/L
BASOPHILS # BLD AUTO: 0.03 K/UL
BASOPHILS NFR BLD: 0.4 %
BLD GP AB SCN CELLS X3 SERPL QL: NORMAL
BUN SERPL-MCNC: 20 MG/DL
CALCIUM SERPL-MCNC: 7.9 MG/DL
CHLORIDE SERPL-SCNC: 112 MMOL/L
CO2 SERPL-SCNC: 26 MMOL/L
CREAT SERPL-MCNC: 1.3 MG/DL
DIFFERENTIAL METHOD: ABNORMAL
EOSINOPHIL # BLD AUTO: 0.2 K/UL
EOSINOPHIL NFR BLD: 2.4 %
ERYTHROCYTE [DISTWIDTH] IN BLOOD BY AUTOMATED COUNT: 17.3 %
EST. GFR  (AFRICAN AMERICAN): >60 ML/MIN/1.73 M^2
EST. GFR  (NON AFRICAN AMERICAN): 56.5 ML/MIN/1.73 M^2
GLUCOSE SERPL-MCNC: 70 MG/DL
HCT VFR BLD AUTO: 21.2 %
HGB BLD-MCNC: 6.6 G/DL
INR PPP: 2.9
LDH SERPL L TO P-CCNC: 250 U/L
LYMPHOCYTES # BLD AUTO: 1.3 K/UL
LYMPHOCYTES NFR BLD: 15.9 %
MAGNESIUM SERPL-MCNC: 2.5 MG/DL
MCH RBC QN AUTO: 27.4 PG
MCHC RBC AUTO-ENTMCNC: 31.1 G/DL
MCV RBC AUTO: 88 FL
MONOCYTES # BLD AUTO: 0.8 K/UL
MONOCYTES NFR BLD: 9.9 %
NEUTROPHILS # BLD AUTO: 5.7 K/UL
NEUTROPHILS NFR BLD: 71.1 %
PHOSPHATE SERPL-MCNC: 2.4 MG/DL
PLATELET # BLD AUTO: 235 K/UL
PMV BLD AUTO: 10.3 FL
POTASSIUM SERPL-SCNC: 4.1 MMOL/L
PROTHROMBIN TIME: 29.1 SEC
RBC # BLD AUTO: 2.41 M/UL
SODIUM SERPL-SCNC: 144 MMOL/L
WBC # BLD AUTO: 7.99 K/UL

## 2017-09-12 PROCEDURE — 83735 ASSAY OF MAGNESIUM: CPT

## 2017-09-12 PROCEDURE — 25000003 PHARM REV CODE 250: Performed by: INTERNAL MEDICINE

## 2017-09-12 PROCEDURE — 20600001 HC STEP DOWN PRIVATE ROOM

## 2017-09-12 PROCEDURE — 63600175 PHARM REV CODE 636 W HCPCS: Performed by: NURSE PRACTITIONER

## 2017-09-12 PROCEDURE — 97535 SELF CARE MNGMENT TRAINING: CPT

## 2017-09-12 PROCEDURE — 85610 PROTHROMBIN TIME: CPT

## 2017-09-12 PROCEDURE — 25000003 PHARM REV CODE 250: Performed by: THORACIC SURGERY (CARDIOTHORACIC VASCULAR SURGERY)

## 2017-09-12 PROCEDURE — A4216 STERILE WATER/SALINE, 10 ML: HCPCS | Performed by: THORACIC SURGERY (CARDIOTHORACIC VASCULAR SURGERY)

## 2017-09-12 PROCEDURE — 93750 INTERROGATION VAD IN PERSON: CPT | Mod: ,,, | Performed by: THORACIC SURGERY (CARDIOTHORACIC VASCULAR SURGERY)

## 2017-09-12 PROCEDURE — 99232 SBSQ HOSP IP/OBS MODERATE 35: CPT | Mod: ,,, | Performed by: INTERNAL MEDICINE

## 2017-09-12 PROCEDURE — 86850 RBC ANTIBODY SCREEN: CPT

## 2017-09-12 PROCEDURE — 99233 SBSQ HOSP IP/OBS HIGH 50: CPT | Mod: 24,,, | Performed by: THORACIC SURGERY (CARDIOTHORACIC VASCULAR SURGERY)

## 2017-09-12 PROCEDURE — 25000003 PHARM REV CODE 250: Performed by: NURSE PRACTITIONER

## 2017-09-12 PROCEDURE — 97803 MED NUTRITION INDIV SUBSEQ: CPT

## 2017-09-12 PROCEDURE — 80048 BASIC METABOLIC PNL TOTAL CA: CPT

## 2017-09-12 PROCEDURE — 84100 ASSAY OF PHOSPHORUS: CPT

## 2017-09-12 PROCEDURE — 86900 BLOOD TYPING SEROLOGIC ABO: CPT

## 2017-09-12 PROCEDURE — 97116 GAIT TRAINING THERAPY: CPT

## 2017-09-12 PROCEDURE — 93750 INTERROGATION VAD IN PERSON: CPT | Performed by: STUDENT IN AN ORGANIZED HEALTH CARE EDUCATION/TRAINING PROGRAM

## 2017-09-12 PROCEDURE — 85025 COMPLETE CBC W/AUTO DIFF WBC: CPT

## 2017-09-12 PROCEDURE — 93750 INTERROGATION VAD IN PERSON: CPT | Mod: ,,, | Performed by: INTERNAL MEDICINE

## 2017-09-12 PROCEDURE — 83615 LACTATE (LD) (LDH) ENZYME: CPT

## 2017-09-12 PROCEDURE — 25000003 PHARM REV CODE 250: Performed by: PHYSICIAN ASSISTANT

## 2017-09-12 PROCEDURE — 27000248 HC VAD-ADDITIONAL DAY

## 2017-09-12 PROCEDURE — 86920 COMPATIBILITY TEST SPIN: CPT

## 2017-09-12 RX ORDER — FUROSEMIDE 40 MG/1
40 TABLET ORAL DAILY
Status: DISCONTINUED | OUTPATIENT
Start: 2017-09-12 | End: 2017-09-13

## 2017-09-12 RX ORDER — ASPIRIN 81 MG/1
81 TABLET ORAL DAILY
Status: DISCONTINUED | OUTPATIENT
Start: 2017-09-12 | End: 2017-09-22 | Stop reason: HOSPADM

## 2017-09-12 RX ADMIN — HYDRALAZINE HYDROCHLORIDE 50 MG: 50 TABLET ORAL at 05:09

## 2017-09-12 RX ADMIN — ASPIRIN 81 MG: 81 TABLET, COATED ORAL at 11:09

## 2017-09-12 RX ADMIN — MAGNESIUM OXIDE TAB 400 MG (241.3 MG ELEMENTAL MG) 800 MG: 400 (241.3 MG) TAB at 09:09

## 2017-09-12 RX ADMIN — DRONABINOL 5 MG: 2.5 CAPSULE ORAL at 08:09

## 2017-09-12 RX ADMIN — DOBUTAMINE IN DEXTROSE 4 MCG/KG/MIN: 200 INJECTION, SOLUTION INTRAVENOUS at 11:09

## 2017-09-12 RX ADMIN — LIDOCAINE 1 PATCH: 50 PATCH TOPICAL at 11:09

## 2017-09-12 RX ADMIN — WARFARIN SODIUM 2.5 MG: 2.5 TABLET ORAL at 04:09

## 2017-09-12 RX ADMIN — HYDRALAZINE HYDROCHLORIDE 50 MG: 50 TABLET ORAL at 09:09

## 2017-09-12 RX ADMIN — SODIUM CHLORIDE, PRESERVATIVE FREE 10 ML: 5 INJECTION INTRAVENOUS at 09:09

## 2017-09-12 RX ADMIN — DRONABINOL 5 MG: 2.5 CAPSULE ORAL at 09:09

## 2017-09-12 RX ADMIN — PRAVASTATIN SODIUM 20 MG: 20 TABLET ORAL at 09:09

## 2017-09-12 RX ADMIN — AMLODIPINE BESYLATE 10 MG: 10 TABLET ORAL at 08:09

## 2017-09-12 RX ADMIN — FUROSEMIDE 40 MG: 40 TABLET ORAL at 11:09

## 2017-09-12 RX ADMIN — HYDRALAZINE HYDROCHLORIDE 50 MG: 50 TABLET ORAL at 02:09

## 2017-09-12 RX ADMIN — MAGNESIUM OXIDE TAB 400 MG (241.3 MG ELEMENTAL MG) 800 MG: 400 (241.3 MG) TAB at 02:09

## 2017-09-12 RX ADMIN — MAGNESIUM OXIDE TAB 400 MG (241.3 MG ELEMENTAL MG) 800 MG: 400 (241.3 MG) TAB at 05:09

## 2017-09-12 RX ADMIN — PANTOPRAZOLE SODIUM 40 MG: 40 TABLET, DELAYED RELEASE ORAL at 08:09

## 2017-09-12 RX ADMIN — Medication 10 ML: at 12:09

## 2017-09-12 RX ADMIN — Medication 10 ML: at 05:09

## 2017-09-12 NOTE — PLAN OF CARE
Problem: Patient Care Overview  Goal: Plan of Care Review  Plan of care discussed with patient. Patient is free of fall/trauma/injury. Denies CP, SOB, or pain/discomfort. Worked  With PT. Getting Lifevest today. Working on alarms. Dobutamine drip infusing. All questions addressed. Will continue to monitor

## 2017-09-12 NOTE — PROGRESS NOTES
Follow up with patient and his wife on sacral area     09/12/17 1430       Pressure Ulcer 08/10/17 0300 midline sacral spine Stage II   Date First Assessed/Time First Assessed: 08/10/17 0300   Pressure Ulcer Present on Admission: (c) other (see comments)  Orientation: midline  Location: sacral spine  Staging: Stage II  Wound Length (cm): 2  Wound Width (cm): 2   Wound Image    Staging Stage II   Healing Pressure Ulcer yes   Pressure Ulcer Risk Factors activity;mobility;nutrition;shear/friction   Dressing Appearance no dressing  (Critic aid paste)   Drainage Amount none   Appearance moist;pink;epithelialization  (left butt resolved)   Periwound Area normal skin tone   Wound Edges open   Wound Length (cm) 1.5   Wound Width (cm) 0.5   Depth (cm) 0.1  (right butt 0.5x0.5x0.1)   Cleansed W/ soap and water;sterile normal saline   Interventions barrier applied     Left buttock has resolved.   Both right buttock  and gluteal crease is smaller and responding to treatment as well.  Patient denies pain to the site like he used to experience.   Sy Nguyen RN CWON  [id number]

## 2017-09-12 NOTE — PROGRESS NOTES
Daily E and M and VAD Interrogation Note    Reason for Visit:  Post op VAD rounds/surgery  Patient is seen in follow up for management               - HM III     Interval History:   The [x] implant  date was 7/27/17     Events -  NAEON.  Eating better.  Ambulating well with PT.  No VAD alarms.       Review of Systems:   Negative except as above.      Medications:  Current Facility-Administered Medications   Medication Dose Route Frequency Provider Last Rate Last Dose    albuterol nebulizer solution 2.5 mg  2.5 mg Nebulization Q4H PRN Shanye Espinoza MD        amlodipine tablet 10 mg  10 mg Oral Daily Geovanna Marques NP   10 mg at 09/12/17 0809    aspirin EC tablet 81 mg  81 mg Oral Daily Graciela Bautista MD   81 mg at 09/12/17 1150    bisacodyl suppository 10 mg  10 mg Rectal Daily PRN Shayne Espinoza MD   10 mg at 09/01/17 0912    cefazolin (ANCEF) 2 gram in dextrose 5% 50 mL IVPB (premix)  2 g Intravenous On Call Procedure Jose R Almonte MD        dextrose 50% injection 12.5 g  12.5 g Intravenous PRN Charbel Ritter MD   12.5 g at 08/30/17 0449    DOBUTamine 500mg in D5W 250mL infusion (premix)  4 mcg/kg/min (Dosing Weight) Intravenous Continuous Nadia Lucia NP 9.6 mL/hr at 09/12/17 1143 4 mcg/kg/min at 09/12/17 1143    dronabinol capsule 5 mg  5 mg Oral BID Geovanna Marques NP   5 mg at 09/12/17 0809    furosemide tablet 40 mg  40 mg Oral Daily MELISSA Wilson   40 mg at 09/12/17 1150    glucagon (human recombinant) injection 1 mg  1 mg Intramuscular PRN Charbel Ritter MD        hydrALAZINE injection 10 mg  10 mg Intravenous Q6H PRN Shlomo Serrato MD   10 mg at 09/05/17 0610    hydrALAZINE tablet 50 mg  50 mg Oral Q8H Geovanna Marques NP   50 mg at 09/12/17 0547    hydrocodone-acetaminophen 5-325mg per tablet 1 tablet  1 tablet Oral Q6H PRN Nadia Lucia NP   1 tablet at 09/06/17 1546    insulin aspart pen 0-5 Units  0-5 Units Subcutaneous Q4H PRN Charbel Hudson  MD Riya   2 Units at 08/10/17 0751    lidocaine 5 % patch 1 patch  1 patch Transdermal Q24H Geovanna Marques NP   1 patch at 09/12/17 1149    magnesium oxide tablet 800 mg  800 mg Oral TID Geovanna Marques NP   800 mg at 09/12/17 0547    ondansetron injection 4 mg  4 mg Intravenous Q6H PRN Shayne Espinoza MD   4 mg at 09/08/17 0928    pantoprazole EC tablet 40 mg  40 mg Oral Daily Geovanna Marques NP   40 mg at 09/12/17 0809    pravastatin tablet 20 mg  20 mg Oral QHS Geovanna Marques NP   20 mg at 09/11/17 2125    sodium chloride 0.9% flush 10 mL  10 mL Intravenous Q6H Sunny Downing MD   10 mL at 09/12/17 1200    And    sodium chloride 0.9% flush 10 mL  10 mL Intravenous PRN Sunny Downing MD   10 mL at 09/11/17 2125    warfarin (COUMADIN) tablet 2.5 mg  2.5 mg Oral Daily Nadia Lucia NP   2.5 mg at 09/11/17 1802       Physical Examination:  Vital Signs:   Vitals:    09/12/17 1100   BP:    Pulse: 80   Resp:    Temp:      Cardiovascular:  [x] Regular rate and rhythm [] Irregular []  None   [x]  No edema []  Edema present  [x]  Clear to auscultation  VAD sounds smooth  Skin:  Incision is [x]  Clean, dry and intact.    Sternum:  [x]  Stable []  Unstable  Driveline(s):   [x]  Clean, dry and intact.       Labs:  CBC, BMP, LDH, INR reviewed      Procedure:  Device Interrogation including analysis of device parameters.  Current Settings  [x]  Ventricular Assist Device     Review of device function is [x]  Stable     TXP LVAD INTERROGATIONS 9/12/2017 9/12/2017 9/12/2017 9/11/2017 9/11/2017 9/11/2017 9/11/2017   Type HeartMate3 HeartMate3 HeartMate3 HeartMate3 HeartMate3 HeartMate3 HeartMate3   Flow 4.2 - 4.2 4.4 4.3 4.3 4.4   Speed 5200 - 5200 5200 5200 5200 5200   PI 3.8 - 3.7 4.0 3.6 3.7 3.8   Power ([place]) 3.5 - 3.5 3.6 3.5 3.6 3.6   LSL - - - - - - -   Pulsatility - - Pulse - Pulse - -   Some recent data might be hidden       Assessment:  [x]  Primary Cardiomyopathy [x]  Congestive Heart Failure    []  Atrial Fibrillation []  Ventricular Tachycardia   []  Aftercare cardiac device [x]  Long term (current) use of anticoagulants   []  Ventilator-associated pneumonia []  Pneumonia viral, unspecified   []  Pneumonia, bacterial, unspecified []  Pneumonia, organism unspecified   []  Hemorrhage of GI tract, unspecified    []  Nosebleed              Plan:  [x]  Interval history obtained from ICU attending team member during rounding today  [x]  VAD/MATT teaching performed with patient  [x]  Mobilization / Physical Therapy ongoing  [x]  Anticoagulation [x]  Ongoing []  Held        Total time spent was 37 minutes.  Of which more than 50 percent of the care dominated counseling and coordinating care with different team members. The VAD was interrogated and all parameters were WNL and no significant findings were found in the history. All these findings are documented in the note above.      Date of Service: 09/12/2017         Cardiac Surgery Attending E and M (VAD) Note along with VAD Interrogation    I have seen and examined the patient and agree with the findings above    I also reviewed the patients clinical course and:  [x]  Hemodynamic & Respiratory parameters  [x]  Laboratory Data  [x]  Radiological studies     VAD Interrogated [x]      VAD Function is normal. Changes made []  None [x]      Interrogation of Ventricular assist device was performed with physician analysis of device parameters and review of device function. I have personally reviewed the interrogation findings and agree with findings as stated.

## 2017-09-12 NOTE — PROCEDURES
Patient up in chair aaox3 with wife at bedside. VAD interrogation completed this AM in the event changes needed to be made. LVAD education done today in a class session with another pt for about an hour covering various alarms, and Mrs Hayden practiced paging LVAD coordinator on call. Pt was fitted with a life vest today and Mrs Hayden was intensely emotional for this education session. Further education is needed and will be on going.  Will continue to monitor for further issues.     Pulsatile: Yes, intermittent   VAD Sounds: HM3  Smooth  Problems / Issues / Alarms with VAD if any: None noted  HCT: 21.2   Modular Cable Connection Intact(no yellow exposed): YES , cable cdi and tightens and loosens easily.     VAD Interrogation:  TXP LVAD INTERROGATIONS 9/12/2017 9/12/2017 9/12/2017 9/12/2017 9/11/2017 9/11/2017 9/11/2017   Type HeartMate3 HeartMate3 HeartMate3 HeartMate3 HeartMate3 HeartMate3 HeartMate3   Flow 4.2 4.2 - 4.2 4.4 4.3 4.3   Speed 5200 5200 - 5200 5200 5200 5200   PI 3.5 3.8 - 3.7 4.0 3.6 3.7   Power ([place]) 3.5 3.5 - 3.5 3.6 3.5 3.6   LSL - - - - - - -   Pulsatility - - - Pulse - Pulse -   Some recent data might be hidden   }

## 2017-09-12 NOTE — PROGRESS NOTES
[facility]  Adult Nutrition  Progress Note    SUMMARY     Recommendations    Recommendation/Intervention: 1. Cont w/ current diet & oral supplement orders; pt's meal intake is improved over the past 24 hrs w/ the change in diet order to Regular; prealb & Alb have been relatively stable   2. Reminded pt of the importance of adequate protein intake to promote wound healing. Discussed those foods w/ high protein. Pt & his wife verbalized understanding.   Goals: Meet >/=85% of EEN/EPN  Nutrition Goal Status: progressing towards goal  Communication of RD Recs: reviewed with RN    Reason for Assessment    Reason for Assessment: RD follow-up  Diagnosis:  (s/p LVAD)  Relevent Medical History: NICM, HTN, HLD   Interdisciplinary Rounds: did not attend     General Information Comments: Spoke to pt & his wife this a.m. Pt reports an improving appetite/meal intake now that he is on a Regular diet. Cont's to drink both Boost Plus & Arginaid daily. Despite diet change, pt is aware of the importance to avoid excessive Na intake    Nutrition Discharge Planning: Adequate nutrition; home on 2gm Na diet    Nutrition Prescription Ordered    Current Diet Order: Regular diet  Nutrition Order Comments: -  Current Nutrition Support Formula Ordered: Discontinued  Current Nutrition Support Rate Ordered: 40 (ml)  Current Nutrition Support Frequency Ordered: mL/hr  Oral Nutrition Supplement: Boost Plus TID; Arginaid BID     Evaluation of Received Nutrients/Fluid Intake     I/O: -112mls x 24 hrs         Fluid Required: meeting needs  Comments: LBM 9/11; good  uop  Tolerance: tolerating  % Intake of Estimated Energy Needs: 50 - 75 % b/w diet & oral suppls  % Meal Intake: 50%     Nutrition Risk Screen     Nutrition Risk Screen: no indicators present    Nutrition/Diet History    Patient Reported Diet/Restrictions/Preferences: general, low salt  Typical Food/Fluid Intake: Improving over the past few days w/ diet upgrade to  "Regular  Food Preferences: No Temple/cultural prefs reported        Factors Affecting Nutritional Intake: decreased appetite (improving)                Labs/Tests/Procedures/Meds       Pertinent Labs Reviewed: reviewed  Pertinent Labs Comments: Cl 112, Phos 2.4, BNP 2076, prealb 11, CRP 14.5, Alb 2.2  Pertinent Medications Reviewed: reviewed, pertinent  Pertinent Medications Comments: pantoprazole, warfarin, statin    Physical Findings    Overall Physical Appearance: lethargic, nourished  Tubes: other (see comments) (none)  Oral/Mouth Cavity: tooth/teeth missing  Skin: pressure ulcer(s), incision (chest incision; healing Stg 3 PU to sacral spine)    Anthropometrics    Temp: 98.5 °F (36.9 °C)     Height: 5' 8" (172.7 cm)  Weight Method: Standard Scale  Weight: 77.8 kg (171 lb 8.3 oz)  Ideal Body Weight (IBW), Male: 154 lb     % Ideal Body Weight, Male (lb): 111.38 lb     BMI (Calculated): 26.1  BMI Grade: 25 - 29.9 - overweight     Usual Body Weight (UBW), k.8 kg (admit wt)     % Usual Body Weight: 115.78  % Weight Change From Usual Weight: -15.78 %             Estimated/Assessed Needs    Weight Used For Calorie Calculations: 77.8 kg (171 lb 8.3 oz)      Energy Calorie Requirements (kcal):   Energy Need Method: Laporte-St Jeor (x 1.25 (PAL))       RMR (Laporte-St. Jeor Equation): 1527.5        Weight Used For Protein Calculations: 77.8 kg (171 lb 8.3 oz)  Protein Requirements:  gms (1.2-1.4 gms/kg)       Fluid Need Method: RDA Method, other (see comments) (Per MD or 1 mL/kcal)        RDA Method (mL):                Assessment and Plan    Nutrition Problem  Inadequate energy intake    Related to (etiology):   Decreased appetite    Signs and Symptoms (as evidenced by):   % meals consumed; prealb 11    Interventions/Recommendations (treatment strategy):  See recs    Nutrition Diagnosis Status:   Improving        Monitor and Evaluation    Food and Nutrient Intake: energy intake, food and beverage " intake  Food and Nutrient Adminstration: diet order     Physical Activity and Function: nutrition-related ADLs and IADLs  Anthropometric Measurements: weight, weight change, body mass index  Biochemical Data, Medical Tests and Procedures: gastrointestinal profile, electrolyte and renal panel, glucose/endocrine profile, lipid profile, inflammatory profile  Nutrition-Focused Physical Findings: overall appearance    Nutrition Risk    Level of Risk: other (see comments) (F/u 1 x weekly)    Nutrition Follow-Up    RD Follow-up?: Yes

## 2017-09-12 NOTE — PROGRESS NOTES
[facility]  Heart Transplant  Progress Note    Patient Name: Suman Hayden  MRN: [id number]  Admission Date: 7/18/2017  Hospital Length of Stay: 56 days  Attending Physician: Deejay Duff Jr.,*  Primary Care Provider: Joe Ernst MD  Principal Problem:LVAD (left ventricular assist device) present    Subjective:     Interval History: working with PT this morning     Continuous Infusions:   DOBUTamine 4 mcg/kg/min (09/12/17 1143)     Scheduled Meds:   amlodipine  10 mg Oral Daily    aspirin  81 mg Oral Daily    dronabinol  5 mg Oral BID    furosemide  40 mg Oral Daily    hydrALAZINE  50 mg Oral Q8H    lidocaine  1 patch Transdermal Q24H    magnesium oxide  800 mg Oral TID    pantoprazole  40 mg Oral Daily    pravastatin  20 mg Oral QHS    sodium chloride 0.9%  10 mL Intravenous Q6H    warfarin  2.5 mg Oral Daily     PRN Meds:albuterol sulfate, bisacodyl, ceFAZolin (ANCEF) IVPB, dextrose 50%, glucagon (human recombinant), hydrALAZINE, hydrocodone-acetaminophen 5-325mg, insulin aspart, ondansetron, Flushing PICC Protocol **AND** sodium chloride 0.9% **AND** sodium chloride 0.9%    Review of patient's allergies indicates:  No Known Allergies  Objective:     Vital Signs (Most Recent):  Temp: 98.3 °F (36.8 °C) (09/12/17 1215)  Pulse: 92 (09/12/17 1215)  Resp: 18 (09/12/17 0800)  BP: (!) 80/0 (09/12/17 1231)  SpO2: (!) 92 % (09/12/17 0800) Vital Signs (24h Range):  Temp:  [98.1 °F (36.7 °C)-98.9 °F (37.2 °C)] 98.3 °F (36.8 °C)  Pulse:  [] 92  Resp:  [16-18] 18  SpO2:  [92 %-100 %] 92 %  BP: ()/(0-71) 80/0     Weight: 77.8 kg (171 lb 8.3 oz)  Body mass index is 26.08 kg/m².      Intake/Output Summary (Last 24 hours) at 09/12/17 1436  Last data filed at 09/12/17 1200   Gross per 24 hour   Intake              648 ml   Output             1150 ml   Net             -502 ml       Hemodynamic Parameters:         Physical Exam   Constitutional: He is oriented to person, place, and  time. He appears well-developed and well-nourished.   HENT:   Head: Normocephalic and atraumatic.   Eyes: EOM are normal. Pupils are equal, round, and reactive to light.   Neck: Normal range of motion. Neck supple. JVD present.   Cardiovascular: Normal rate and regular rhythm.    No murmur heard.  VAD hum smooth   Pulmonary/Chest: Effort normal and breath sounds normal.   Abdominal: Soft. Bowel sounds are normal. He exhibits no distension. There is no tenderness. There is no guarding.   Musculoskeletal: Normal range of motion. He exhibits no edema.   Neurological: He is alert and oriented to person, place, and time.   Skin: Skin is warm and dry.   Nursing note and vitals reviewed.      Significant Labs:  CBC:    Recent Labs  Lab 09/12/17 0446   WBC 7.99   RBC 2.41*   HGB 6.6*   HCT 21.2*      MCV 88   MCH 27.4   MCHC 31.1*     BNP:    Recent Labs  Lab 09/11/17 0522   BNP 2,076*     CMP:    Recent Labs  Lab 09/11/17 0522 09/12/17 0446   GLU  --   < > 70   CALCIUM  --   < > 7.9*   ALBUMIN 2.2*  --   --    PROT 6.4  --   --    NA  --   < > 144   K  --   < > 4.1   CO2  --   < > 26   CL  --   < > 112*   BUN  --   < > 20   CREATININE  --   < > 1.3   ALKPHOS 107  --   --    ALT 12  --   --    AST 16  --   --    BILITOT 1.1*  --   --    < > = values in this interval not displayed.   Coagulation:     Recent Labs  Lab 09/12/17 0446   INR 2.9*     LDH:    Recent Labs  Lab 09/10/17  1500 09/11/17 0522 09/12/17 0446   * 244 250     Microbiology:  Microbiology Results (last 7 days)     ** No results found for the last 168 hours. **          I have reviewed all pertinent labs within the past 24 hours.    Estimated Creatinine Clearance: 53.3 mL/min (based on SCr of 1.3 mg/dL).    Diagnostic Results:  I have reviewed and interpreted all pertinent imaging results/findings within the past 24 hours.    Assessment and Plan:     * LVAD (left ventricular assist device) present    -HeartMate 3 Implanted 7/27/2017 as  DT   -s/p delayed chest closure (7/28/17) and extubated (7/29/17), reintubated 8/9/17 and extubated 8/13/17  -CTS Primary  -Implanted by Dr. Downing  -Continue Coumadin, Goal INR 2.0-3.0. Has been on hold the last 2 nights   -Antiplatelets : n/a  -LDH is stable overall today. Will continue to monitor daily.  -Speed set at 5200 rpm  -Interrogation notable for no events  -Not listed for OHTx           Stage III pressure ulcer of sacral region    - wound care        Bacteremia    - ESBL bacteremia.Cont ertapenem. Per ID- continue ertapenem x 4-6 weeks from 8/11. Coarse completed   - blood cultures from 8/29 NGTD          Atrial fibrillation    -AC, Amio per C TS          Hyperglycemia    -per primary team        Atrial tachycardia    - [id number] - 3  -Rec restarting Amio. AC per CTS        AICD discharge    - Appropriate in the setting of VT aggravated by underlying AT/AFL (earlier during the admission). Device reprogrammed to VVI 80 this admission  -EP planning revision as outpatient           Hepatitis B core antibody positive since 2012              V-tach    - Recommend resuming Amio, NSVT on tele        Hyperlipidemia    -continue pravastatin        Essential hypertension    -Patient is pulsatile  -MAP goal 60-80 mmHg  -Blood pressure controlled over the last 24 hours  -Antihypertensive medications include Amlodipine and Hydralazine  Will monitor trends              CHF (NYHA class IV, ACC/AHA stage D)    -NICM  -Last 2D Echo 9/1/2017: LVEF 10-15%, LVEDD 6.3 cm  -Current diuretic regimen: Furosemide po. Net even over the last 24 hours. Will I/Os not accurate. Diuretic regimen per primary.  -GDMT with n/a  -2g Na dietary restriction, 1500 mL fluid restriction, strict I/Os              MELISSA Long  Heart Transplant  [facility]

## 2017-09-12 NOTE — PLAN OF CARE
Problem: Physical Therapy Goal  Goal: Physical Therapy Goal  Goals to be met by: 10/9/17     Patient will increase functional independence with mobility by performin. Supine to sit with MInimal Assistance-  Met 2017  2. Sit to supine with MInimal Assistance - not met  3. Sit to stand transfer with Minimal Assistance- met       Updated: Sit to stand transfer with supervision using RW   4. Bed to chair transfer with Minimal Assistance- not met  5. Gait  x 50 feet with Minimal Assistance. -  Met 2017  6. Lower extremity exercise program x15 reps, with supervision, in order to increase LE strength and (I) with functional mobility. - not met  7.Pt mod I supine to sit- not met  8. Pt receive gait training ~ 220 ft with AD if needed and supervision- not met            Outcome: Ongoing (interventions implemented as appropriate)  Goals reviewed and remain appropriate. Pt progressing towards goals.    Kely Willett, PT, DPT   2017  [phone]

## 2017-09-12 NOTE — PT/OT/SLP PROGRESS
Physical Therapy  Treatment    Suman Haydne   MRN: [id number]   Admitting Diagnosis: LVAD (left ventricular assist device) present    PT Received On: 09/12/17  PT Start Time: 1342     PT Stop Time: 1408    PT Total Time (min): 26 min       Billable Minutes:  Gait [id number]    Treatment Type: Treatment  PT/PTA: PT     PTA Visit Number: 0       General Precautions: Standard, LVAD, fall  Orthopedic Precautions: N/A   Braces: N/A    Do you have any cultural, spiritual, Congregation conflicts, given your current situation?: none noted     Subjective:  Communicated with RN prior to session.  Pt agreeable to therapy.   Therapy tech Nirmala present throughout session.     Pain/Comfort  Pain Rating 1: 0/10    Objective:   Patient found with: telemetry, PICC line (LVAD to battery power; Lifevest)    Functional Mobility:  Bed Mobility:   Supine to Sit:  (not performed 2* pt UIC before and after treatment session)    Transfers:  Sit <> Stand Assistance: Moderate Assistance (x3 reps)  Sit <> Stand Assistive Device: 4 wheeled walker    Gait:   Gait Distance: ~100 ft. with 3 intermittent seated rest breaks   Assistance 1: Maximum assistance, Minimum assistance, Moderate assistance (ranged from min-maxA 2* intermittent posterior lean )  Gait Assistive Device: Rollator  Gait Pattern: swing-through gait  Gait Deviation(s): decreased hal, increased time in double stance, decreased velocity of limb motion, decreased weight-shifting ability, decreased step length, decreased toe-to-floor clearance, backward lean   Chair follow throughout    Emergency bag present throughout    PT provided min-maxA throughout gait; Therapy tech present for assist with lines, balance, and rollator management; pt's wife providing chair follow throughout gait   Mod v/c and t/c for anterior weight shift, forward progression, and balance. Mod v/c for maintaining close proximity to rollator. Required intermittent assist for steering and slowing speed of rollator.      Balance:   Static Sit: SBA  Dynamic Sit: SBA-CGA  Static Stand: CGA-Pio  Dynamic stand: min-maxA     Therapeutic Activities and Exercises:  Pt found on battery power with LVAD vest donned.   Pt educated on rollator use prior to gait training. Pt educated on how to properly lock/unlock breaks and when to do so. Pt required Pio for managing rollator breaks 2* decreased hand strength.   Performed ambulation in hallway as described above. Distance limited this session due to IV battery running low and needing to be plugged in.      AM-PAC 6 CLICK MOBILITY  How much help from another person does this patient currently need?   1 = Unable, Total/Dependent Assistance  2 = A lot, Maximum/Moderate Assistance  3 = A little, Minimum/Contact Guard/Supervision  4 = None, Modified [place]/Independent    Turning over in bed (including adjusting bedclothes, sheets and blankets)?: 3  Sitting down on and standing up from a chair with arms (e.g., wheelchair, bedside commode, etc.): 2  Moving from lying on back to sitting on the side of the bed?: 3  Moving to and from a bed to a chair (including a wheelchair)?: 2  Need to walk in hospital room?: 2  Climbing 3-5 steps with a railing?: 1  Total Score: 13    AM-PAC Raw Score CMS G-Code Modifier Level of Impairment Assistance   6 % Total / Unable   7 - 9 CM 80 - 100% Maximal Assist   10 - 14 CL 60 - 80% Moderate Assist   15 - 19 CK 40 - 60% Moderate Assist   20 - 22 CJ 20 - 40% Minimal Assist   23 CI 1-20% SBA / CGA   24 CH 0% Independent/ Mod I     Patient left up in chair with all lines intact, call button in reach and pt's wife present.    Assessment:  Suman Hayden is a 67 y.o. male with a medical diagnosis of LVAD (left ventricular assist device) present, inserted 7/27/17. Ambulation limited this session due to IV battery low and needing to be plugged in. Trial of rollator during gait this session with pt requiring occasional assist for steering and slowing speed of  rollator. Pt continues to demo intermittent posterior lean during gait with required min-maxA balance and anterior weight shift. Pt also with limited endurance requiring intermittent seated rest breaks. Pt would continue to benefit from skilled acute PT in order to address current deficits and progress functional mobility.     Rehab identified problem list/impairments: Rehab identified problem list/impairments: impaired cardiopulmonary response to activity, decreased safety awareness, impaired functional mobilty, weakness, impaired endurance, decreased coordination, gait instability, impaired balance, impaired self care skills    Rehab potential is good.    Activity tolerance: Good    Discharge recommendations: Discharge Facility/Level Of Care Needs: rehabilitation facility     Barriers to discharge: Barriers to Discharge: None    Equipment recommendations: Equipment Needed After Discharge: bedside commode, rollator, wheelchair     GOALS:    Physical Therapy Goals        Problem: Physical Therapy Goal    Goal Priority Disciplines Outcome Goal Variances Interventions   Physical Therapy Goal     PT/OT, PT Ongoing (interventions implemented as appropriate)     Description:  Goals to be met by: 10/9/17     Patient will increase functional independence with mobility by performin. Supine to sit with MInimal Assistance-  Met 2017  2. Sit to supine with MInimal Assistance - not met  3. Sit to stand transfer with Minimal Assistance- met       Updated: Sit to stand transfer with supervision using RW   4. Bed to chair transfer with Minimal Assistance- not met  5. Gait  x 50 feet with Minimal Assistance. -  Met 2017  6. Lower extremity exercise program x15 reps, with supervision, in order to increase LE strength and (I) with functional mobility. - not met  7.Pt mod I supine to sit- not met  8. Pt receive gait training ~ 220 ft with AD if needed and supervision- not met                             PLAN:     Patient to be seen 6 x/week  to address the above listed problems via gait training, therapeutic activities, therapeutic exercises  Plan of Care expires: 10/09/17  Plan of Care reviewed with: patient, spouse        Kely Willett, PT, DPT   9/12/2017  [phone]

## 2017-09-12 NOTE — SUBJECTIVE & OBJECTIVE
Interval History: working with PT this morning     Continuous Infusions:   DOBUTamine 4 mcg/kg/min (09/12/17 1143)     Scheduled Meds:   amlodipine  10 mg Oral Daily    aspirin  81 mg Oral Daily    dronabinol  5 mg Oral BID    furosemide  40 mg Oral Daily    hydrALAZINE  50 mg Oral Q8H    lidocaine  1 patch Transdermal Q24H    magnesium oxide  800 mg Oral TID    pantoprazole  40 mg Oral Daily    pravastatin  20 mg Oral QHS    sodium chloride 0.9%  10 mL Intravenous Q6H    warfarin  2.5 mg Oral Daily     PRN Meds:albuterol sulfate, bisacodyl, ceFAZolin (ANCEF) IVPB, dextrose 50%, glucagon (human recombinant), hydrALAZINE, hydrocodone-acetaminophen 5-325mg, insulin aspart, ondansetron, Flushing PICC Protocol **AND** sodium chloride 0.9% **AND** sodium chloride 0.9%    Review of patient's allergies indicates:  No Known Allergies  Objective:     Vital Signs (Most Recent):  Temp: 98.3 °F (36.8 °C) (09/12/17 1215)  Pulse: 92 (09/12/17 1215)  Resp: 18 (09/12/17 0800)  BP: (!) 80/0 (09/12/17 1231)  SpO2: (!) 92 % (09/12/17 0800) Vital Signs (24h Range):  Temp:  [98.1 °F (36.7 °C)-98.9 °F (37.2 °C)] 98.3 °F (36.8 °C)  Pulse:  [] 92  Resp:  [16-18] 18  SpO2:  [92 %-100 %] 92 %  BP: ()/(0-71) 80/0     Weight: 77.8 kg (171 lb 8.3 oz)  Body mass index is 26.08 kg/m².      Intake/Output Summary (Last 24 hours) at 09/12/17 1436  Last data filed at 09/12/17 1200   Gross per 24 hour   Intake              648 ml   Output             1150 ml   Net             -502 ml       Hemodynamic Parameters:         Physical Exam   Constitutional: He is oriented to person, place, and time. He appears well-developed and well-nourished.   HENT:   Head: Normocephalic and atraumatic.   Eyes: EOM are normal. Pupils are equal, round, and reactive to light.   Neck: Normal range of motion. Neck supple. JVD present.   Cardiovascular: Normal rate and regular rhythm.    No murmur heard.  VAD hum smooth   Pulmonary/Chest: Effort  normal and breath sounds normal.   Abdominal: Soft. Bowel sounds are normal. He exhibits no distension. There is no tenderness. There is no guarding.   Musculoskeletal: Normal range of motion. He exhibits no edema.   Neurological: He is alert and oriented to person, place, and time.   Skin: Skin is warm and dry.   Nursing note and vitals reviewed.      Significant Labs:  CBC:    Recent Labs  Lab 09/12/17 0446   WBC 7.99   RBC 2.41*   HGB 6.6*   HCT 21.2*      MCV 88   MCH 27.4   MCHC 31.1*     BNP:    Recent Labs  Lab 09/11/17 0522   BNP 2,076*     CMP:    Recent Labs  Lab 09/11/17 0522 09/12/17 0446   GLU  --   < > 70   CALCIUM  --   < > 7.9*   ALBUMIN 2.2*  --   --    PROT 6.4  --   --    NA  --   < > 144   K  --   < > 4.1   CO2  --   < > 26   CL  --   < > 112*   BUN  --   < > 20   CREATININE  --   < > 1.3   ALKPHOS 107  --   --    ALT 12  --   --    AST 16  --   --    BILITOT 1.1*  --   --    < > = values in this interval not displayed.   Coagulation:     Recent Labs  Lab 09/12/17 0446   INR 2.9*     LDH:    Recent Labs  Lab 09/10/17  1500 09/11/17 0522 09/12/17 0446   * 244 250     Microbiology:  Microbiology Results (last 7 days)     ** No results found for the last 168 hours. **          I have reviewed all pertinent labs within the past 24 hours.    Estimated Creatinine Clearance: 53.3 mL/min (based on SCr of 1.3 mg/dL).    Diagnostic Results:  I have reviewed and interpreted all pertinent imaging results/findings within the past 24 hours.

## 2017-09-12 NOTE — PLAN OF CARE
Problem: Patient Care Overview  Goal: Plan of Care Review  Outcome: Ongoing (interventions implemented as appropriate)  Patient cooperative and pleasant with routine care and procedures.  Wife at bedside and studying LVAD alarms with patient.  Anticipating alarm test in am.  Resting quietly without complaints.  Will continue to monitor.

## 2017-09-12 NOTE — PT/OT/SLP PROGRESS
Occupational Therapy  Treatment    Suman Hayden   MRN: [id number]   Admitting Diagnosis: LVAD (left ventricular assist device) present    OT Date of Treatment: 09/12/17   OT Start Time: 1112  OT Stop Time: 1128  OT Total Time (min): 16 min    Billable Minutes:  Self Care/Home Management 16 minutes    General Precautions: Standard, LVAD, fall  Orthopedic Precautions: N/A  Braces: N/A    Subjective:  Communicated with RN prior to session. Session limited as first attempt pt having LVAD dressing done, [employer] whitaker arrived to see pt during session, and pt with LVAD coordinators in afternoon.    Pain/Comfort  Pain Rating 1: 0/10  Pain Rating Post-Intervention 1: 0/10    Objective:  Patient found with: telemetry, PICC line (LVAD to battery power)     Functional Mobility:  Did not occur this session; pt up in chair    Activities of Daily Living:  LE Dressing Level of Assistance: Minimum assistance to don tennis shoes while seated; required increased time and assistance to don  Grooming Position: Seated, bedside chair  Grooming Level of Assistance: Supervision to wash face    Balance:   Static Sit: NORMAL: No deviations seen in posture held statically  Dynamic Sit: GOOD+: Maintains balance through MAXIMAL excursions of active trunk motion    Therapeutic Activities and Exercises:  Pt up in chair on battery power; already recorded numbers in binder and vest donned properly; performed grooming and LB dressing in bedside chair; further OT limited as [employer] rep arrived to fit pt and pt unavailable in the afternoon    AM-PAC 6 CLICK ADL   How much help from another person does this patient currently need?   1 = Unable, Total/Dependent Assistance  2 = A lot, Maximum/Moderate Assistance  3 = A little, Minimum/Contact Guard/Supervision  4 = None, Modified [place]/Independent    Putting on and taking off regular lower body clothing? : 2  Bathing (including washing, rinsing, drying)?: 2  Toileting, which includes using toilet,  "bedpan, or urinal? : 2  Putting on and taking off regular upper body clothing?: 3  Taking care of personal grooming such as brushing teeth?: 3  Eating meals?: 3  Total Score: 15     AM-PAC Raw Score CMS "G-Code Modifier Level of Impairment Assistance   6 % Total / Unable   7 - 8 CM 80 - 100% Maximal Assist   9-13 CL 60 - 80% Moderate Assist   14 - 19 CK 40 - 60% Moderate Assist   20 - 22 CJ 20 - 40% Minimal Assist   23 CI 1-20% SBA / CGA   24 CH 0% Independent/ Mod I       Patient left up in chair with all lines intact, call button in reach and [employer] rep and wife present    ASSESSMENT:  Suman Hayden is a 67 y.o. male with a medical diagnosis of LVAD (left ventricular assist device) present and presents with deficits listed below. Pt would continue to benefit from OT to increase independence and safety. Recommend rehab upon D/C.    Rehab identified problem list/impairments: Rehab identified problem list/impairments: weakness, impaired endurance, impaired self care skills, impaired functional mobilty, impaired cardiopulmonary response to activity    Rehab potential is good.    Activity tolerance: Good    Discharge recommendations: Discharge Facility/Level Of Care Needs: rehabilitation facility     Barriers to discharge: Barriers to Discharge: None    Equipment recommendations: bedside commode, rollator, wheelchair     GOALS:    Occupational Therapy Goals        Problem: Occupational Therapy Goal    Goal Priority Disciplines Outcome Interventions   Occupational Therapy Goal     OT, PT/OT Ongoing (interventions implemented as appropriate)    Description:  Goals to be met by:  2 weeks 9/25/17    Patient will increase functional independence with ADLs by performing:  Feeding: Independent   UE Dressing with Supervision.  LE Dressing with Supervision.  Grooming while standing with Supervision.  Toileting from toilet with Supervision for hygiene and clothing management.   Stand pivot transfers with " Supervision.  Toilet transfer to toilet with Supervision.  Pt will be supervision with LVAD yasmin't.     Goals to be met by:  2 weeks 9/12/17    Patient will increase functional independence with ADLs by performing:  Feeding: Independent   UE Dressing with Supervision.  LE Dressing with Supervision.  Grooming while standing with Supervision.  Toileting from toilet with Supervision for hygiene and clothing management.   Stand pivot transfers with Supervision.  Toilet transfer to toilet with Supervision.  Pt will be supervision  with LVAD yasmin't.     Goals to be met by:  2 weeks 8/28/17    Patient will increase functional independence with ADLs by performing:  Feeding: Independent   UE Dressing with Supervision.  LE Dressing with Supervision.  Grooming while standing with Supervision.  Toileting from toilet with Supervision for hygiene and clothing management.   Stand pivot transfers with Supervision.  Toilet transfer to toilet with Supervision.  Pt will be supervision  with LVAD yasmin't.                    Multidisciplinary Problems (Resolved)        Problem: Occupational Therapy Goal    Goal Priority Disciplines Outcome Interventions   Occupational Therapy Goal   (Resolved)     OT, PT/OT  Error    Description:  Goals to be met by: 8/04/2017    Patient will increase functional independence with ADLs by performing:    Increased functional strength to 5/5 for improved ADL performance.  Upper extremity exercise program and R LE ankle pumps  3x 10 reps per handout, with independence.                      Plan:  Patient to be seen 6 x/week to address the above listed problems via self-care/home management, therapeutic activities, therapeutic exercises  Plan of Care expires: 09/21/17  Plan of Care reviewed with: patient, spouse         DELMY Mohamud  09/12/2017

## 2017-09-13 LAB
ALBUMIN SERPL BCP-MCNC: 2.2 G/DL
ALP SERPL-CCNC: 101 U/L
ALT SERPL W/O P-5'-P-CCNC: 12 U/L
ANION GAP SERPL CALC-SCNC: 10 MMOL/L
AST SERPL-CCNC: 19 U/L
BASOPHILS # BLD AUTO: 0.04 K/UL
BASOPHILS NFR BLD: 0.5 %
BILIRUB DIRECT SERPL-MCNC: 0.7 MG/DL
BILIRUB SERPL-MCNC: 1.1 MG/DL
BLD PROD TYP BPU: NORMAL
BLOOD UNIT EXPIRATION DATE: NORMAL
BLOOD UNIT TYPE CODE: 5100
BLOOD UNIT TYPE: NORMAL
BNP SERPL-MCNC: 2132 PG/ML
BUN SERPL-MCNC: 23 MG/DL
CALCIUM SERPL-MCNC: 7.9 MG/DL
CHLORIDE SERPL-SCNC: 110 MMOL/L
CO2 SERPL-SCNC: 23 MMOL/L
CODING SYSTEM: NORMAL
CREAT SERPL-MCNC: 1.4 MG/DL
CRP SERPL-MCNC: 29.1 MG/L
DIFFERENTIAL METHOD: ABNORMAL
DISPENSE STATUS: NORMAL
EOSINOPHIL # BLD AUTO: 0.2 K/UL
EOSINOPHIL NFR BLD: 3.1 %
ERYTHROCYTE [DISTWIDTH] IN BLOOD BY AUTOMATED COUNT: 17.3 %
EST. GFR  (AFRICAN AMERICAN): 59.7 ML/MIN/1.73 M^2
EST. GFR  (NON AFRICAN AMERICAN): 51.6 ML/MIN/1.73 M^2
GLUCOSE SERPL-MCNC: 69 MG/DL
HCT VFR BLD AUTO: 20.5 %
HGB BLD-MCNC: 6.4 G/DL
INR PPP: 2.9
LDH SERPL L TO P-CCNC: 275 U/L
LYMPHOCYTES # BLD AUTO: 1.3 K/UL
LYMPHOCYTES NFR BLD: 17.9 %
MAGNESIUM SERPL-MCNC: 2.9 MG/DL
MCH RBC QN AUTO: 26.7 PG
MCHC RBC AUTO-ENTMCNC: 31.2 G/DL
MCV RBC AUTO: 85 FL
MONOCYTES # BLD AUTO: 0.7 K/UL
MONOCYTES NFR BLD: 8.9 %
NEUTROPHILS # BLD AUTO: 5.1 K/UL
NEUTROPHILS NFR BLD: 69.5 %
PHOSPHATE SERPL-MCNC: 2.7 MG/DL
PLATELET # BLD AUTO: 215 K/UL
PMV BLD AUTO: 10 FL
POTASSIUM SERPL-SCNC: 4.1 MMOL/L
PREALB SERPL-MCNC: 11 MG/DL
PROT SERPL-MCNC: 6.3 G/DL
PROTHROMBIN TIME: 28.9 SEC
RBC # BLD AUTO: 2.4 M/UL
SODIUM SERPL-SCNC: 143 MMOL/L
TRANS ERYTHROCYTES VOL PATIENT: NORMAL ML
WBC # BLD AUTO: 7.32 K/UL

## 2017-09-13 PROCEDURE — 83735 ASSAY OF MAGNESIUM: CPT

## 2017-09-13 PROCEDURE — A4216 STERILE WATER/SALINE, 10 ML: HCPCS | Performed by: THORACIC SURGERY (CARDIOTHORACIC VASCULAR SURGERY)

## 2017-09-13 PROCEDURE — 93750 INTERROGATION VAD IN PERSON: CPT | Performed by: STUDENT IN AN ORGANIZED HEALTH CARE EDUCATION/TRAINING PROGRAM

## 2017-09-13 PROCEDURE — P9021 RED BLOOD CELLS UNIT: HCPCS

## 2017-09-13 PROCEDURE — 25000003 PHARM REV CODE 250: Performed by: PHYSICIAN ASSISTANT

## 2017-09-13 PROCEDURE — 20600001 HC STEP DOWN PRIVATE ROOM

## 2017-09-13 PROCEDURE — 93750 INTERROGATION VAD IN PERSON: CPT | Mod: ,,, | Performed by: INTERNAL MEDICINE

## 2017-09-13 PROCEDURE — 80048 BASIC METABOLIC PNL TOTAL CA: CPT

## 2017-09-13 PROCEDURE — 80076 HEPATIC FUNCTION PANEL: CPT

## 2017-09-13 PROCEDURE — 25000003 PHARM REV CODE 250: Performed by: NURSE PRACTITIONER

## 2017-09-13 PROCEDURE — 99232 SBSQ HOSP IP/OBS MODERATE 35: CPT | Mod: ,,, | Performed by: INTERNAL MEDICINE

## 2017-09-13 PROCEDURE — 25000003 PHARM REV CODE 250: Performed by: THORACIC SURGERY (CARDIOTHORACIC VASCULAR SURGERY)

## 2017-09-13 PROCEDURE — 97530 THERAPEUTIC ACTIVITIES: CPT

## 2017-09-13 PROCEDURE — 85610 PROTHROMBIN TIME: CPT

## 2017-09-13 PROCEDURE — 85025 COMPLETE CBC W/AUTO DIFF WBC: CPT

## 2017-09-13 PROCEDURE — 84100 ASSAY OF PHOSPHORUS: CPT

## 2017-09-13 PROCEDURE — 86140 C-REACTIVE PROTEIN: CPT

## 2017-09-13 PROCEDURE — 83615 LACTATE (LD) (LDH) ENZYME: CPT

## 2017-09-13 PROCEDURE — 97116 GAIT TRAINING THERAPY: CPT

## 2017-09-13 PROCEDURE — 84134 ASSAY OF PREALBUMIN: CPT

## 2017-09-13 PROCEDURE — 63600175 PHARM REV CODE 636 W HCPCS: Performed by: PHYSICIAN ASSISTANT

## 2017-09-13 PROCEDURE — 27201040 HC RC 50 FILTER

## 2017-09-13 PROCEDURE — 63600175 PHARM REV CODE 636 W HCPCS: Performed by: NURSE PRACTITIONER

## 2017-09-13 PROCEDURE — 25000003 PHARM REV CODE 250: Performed by: INTERNAL MEDICINE

## 2017-09-13 PROCEDURE — 83880 ASSAY OF NATRIURETIC PEPTIDE: CPT

## 2017-09-13 PROCEDURE — 97110 THERAPEUTIC EXERCISES: CPT

## 2017-09-13 PROCEDURE — 97535 SELF CARE MNGMENT TRAINING: CPT

## 2017-09-13 PROCEDURE — 27000248 HC VAD-ADDITIONAL DAY

## 2017-09-13 RX ORDER — HYDROCODONE BITARTRATE AND ACETAMINOPHEN 500; 5 MG/1; MG/1
TABLET ORAL
Status: DISCONTINUED | OUTPATIENT
Start: 2017-09-13 | End: 2017-09-22 | Stop reason: HOSPADM

## 2017-09-13 RX ORDER — FUROSEMIDE 40 MG/1
40 TABLET ORAL 2 TIMES DAILY
Status: DISCONTINUED | OUTPATIENT
Start: 2017-09-13 | End: 2017-09-20

## 2017-09-13 RX ADMIN — Medication 10 ML: at 12:09

## 2017-09-13 RX ADMIN — FUROSEMIDE 40 MG: 40 TABLET ORAL at 08:09

## 2017-09-13 RX ADMIN — LIDOCAINE 1 PATCH: 50 PATCH TOPICAL at 12:09

## 2017-09-13 RX ADMIN — AMLODIPINE BESYLATE 10 MG: 10 TABLET ORAL at 08:09

## 2017-09-13 RX ADMIN — PRAVASTATIN SODIUM 20 MG: 20 TABLET ORAL at 08:09

## 2017-09-13 RX ADMIN — WARFARIN SODIUM 2.5 MG: 2.5 TABLET ORAL at 05:09

## 2017-09-13 RX ADMIN — DOBUTAMINE IN DEXTROSE 2.5 MCG/KG/MIN: 200 INJECTION, SOLUTION INTRAVENOUS at 12:09

## 2017-09-13 RX ADMIN — Medication 10 ML: at 05:09

## 2017-09-13 RX ADMIN — ASPIRIN 81 MG: 81 TABLET, COATED ORAL at 08:09

## 2017-09-13 RX ADMIN — HYDRALAZINE HYDROCHLORIDE 50 MG: 50 TABLET ORAL at 05:09

## 2017-09-13 RX ADMIN — DRONABINOL 5 MG: 2.5 CAPSULE ORAL at 08:09

## 2017-09-13 RX ADMIN — MAGNESIUM OXIDE TAB 400 MG (241.3 MG ELEMENTAL MG) 800 MG: 400 (241.3 MG) TAB at 05:09

## 2017-09-13 RX ADMIN — HYDRALAZINE HYDROCHLORIDE 50 MG: 50 TABLET ORAL at 01:09

## 2017-09-13 RX ADMIN — HYDRALAZINE HYDROCHLORIDE 50 MG: 50 TABLET ORAL at 08:09

## 2017-09-13 RX ADMIN — PANTOPRAZOLE SODIUM 40 MG: 40 TABLET, DELAYED RELEASE ORAL at 08:09

## 2017-09-13 NOTE — PROGRESS NOTES
[facility]  Heart Transplant  Progress Note    Patient Name: Suman Hayden  MRN: [id number]  Admission Date: 7/18/2017  Hospital Length of Stay: 57 days  Attending Physician: Deejay Duff Jr.,*  Primary Care Provider: Joe Ernst MD  Principal Problem:LVAD (left ventricular assist device) present    Subjective:     Interval History: patient denies any complaints today, no dizziness    Continuous Infusions:   DOBUTamine 2.5 mcg/kg/min (09/13/17 1246)     Scheduled Meds:   amlodipine  10 mg Oral Daily    aspirin  81 mg Oral Daily    dronabinol  5 mg Oral BID    furosemide  40 mg Oral BID    hydrALAZINE  50 mg Oral Q8H    lidocaine  1 patch Transdermal Q24H    pantoprazole  40 mg Oral Daily    pravastatin  20 mg Oral QHS    sodium chloride 0.9%  10 mL Intravenous Q6H    warfarin  2.5 mg Oral Daily     PRN Meds:sodium chloride, albuterol sulfate, bisacodyl, ceFAZolin (ANCEF) IVPB, dextrose 50%, glucagon (human recombinant), hydrALAZINE, hydrocodone-acetaminophen 5-325mg, insulin aspart, ondansetron, Flushing PICC Protocol **AND** sodium chloride 0.9% **AND** sodium chloride 0.9%    Review of patient's allergies indicates:  No Known Allergies  Objective:     Vital Signs (Most Recent):  Temp: 97.8 °F (36.6 °C) (09/13/17 1330)  Pulse: 79 (09/13/17 1330)  Resp: 18 (09/13/17 1330)  BP: (!) 82/0 (09/13/17 1330)  SpO2: 97 % (09/13/17 1330) Vital Signs (24h Range):  Temp:  [97.4 °F (36.3 °C)-98.6 °F (37 °C)] 97.8 °F (36.6 °C)  Pulse:  [] 79  Resp:  [16-19] 18  SpO2:  [91 %-97 %] 97 %  BP: ()/(0-71) 82/0     Weight: 78.4 kg (172 lb 13.5 oz)  Body mass index is 26.28 kg/m².      Intake/Output Summary (Last 24 hours) at 09/13/17 1424  Last data filed at 09/13/17 0600   Gross per 24 hour   Intake           635.52 ml   Output              200 ml   Net           435.52 ml       Hemodynamic Parameters:         Physical Exam   Constitutional: He is oriented to person, place, and time.  He appears well-developed and well-nourished.   HENT:   Head: Normocephalic and atraumatic.   Eyes: EOM are normal. Pupils are equal, round, and reactive to light.   Neck: Normal range of motion. Neck supple. JVD present.   Cardiovascular: Normal rate and regular rhythm.    No murmur heard.  VAD hum smooth   Pulmonary/Chest: Effort normal and breath sounds normal.   Abdominal: Soft. Bowel sounds are normal. He exhibits no distension. There is no tenderness. There is no guarding.   Musculoskeletal: Normal range of motion. He exhibits no edema.   Neurological: He is alert and oriented to person, place, and time.   Skin: Skin is warm and dry.   Nursing note and vitals reviewed.      Significant Labs:  CBC:    Recent Labs  Lab 09/13/17 0407   WBC 7.32   RBC 2.40*   HGB 6.4*   HCT 20.5*      MCV 85   MCH 26.7*   MCHC 31.2*     BNP:    Recent Labs  Lab 09/13/17 0407   BNP 2,132*     CMP:    Recent Labs  Lab 09/13/17 0407   GLU 69*   CALCIUM 7.9*   ALBUMIN 2.2*   PROT 6.3      K 4.1   CO2 23      BUN 23   CREATININE 1.4   ALKPHOS 101   ALT 12   AST 19   BILITOT 1.1*      Coagulation:     Recent Labs  Lab 09/13/17 0407   INR 2.9*     LDH:    Recent Labs  Lab 09/10/17  1500 09/11/17  0522 09/12/17  0446 09/13/17  0407   * 244 250 275*     Microbiology:  Microbiology Results (last 7 days)     ** No results found for the last 168 hours. **          I have reviewed all pertinent labs within the past 24 hours.    Estimated Creatinine Clearance: 49.5 mL/min (based on SCr of 1.4 mg/dL).    Diagnostic Results:  I have reviewed and interpreted all pertinent imaging results/findings within the past 24 hours.    Assessment and Plan:     * LVAD (left ventricular assist device) present    -HeartMate 3 Implanted 7/27/2017 as DT   -s/p delayed chest closure (7/28/17) and extubated (7/29/17), reintubated 8/9/17 and extubated 8/13/17  -HTS primary, will wean  and transfuse one unit today   -Implanted by   [place]  -Continue Coumadin, Goal INR 2.0-3.0.   -Antiplatelets : n/a  -LDH is stable overall today. Will continue to monitor daily.  -Speed set at 5200 rpm  -Interrogation notable for no events  -Not listed for OHTx           Stage III pressure ulcer of sacral region    - wound care        Bacteremia    - ESBL bacteremia.Cont ertapenem. Per ID- continue ertapenem x 4-6 weeks from 8/11. Coarse completed   - blood cultures from 8/29 NGTD          Atrial fibrillation    -AC, Amio per C TS          Hyperglycemia    -per primary team        Atrial tachycardia    - [id number] - 3  -Rec restarting Amio. AC per CTS        AICD discharge    - Appropriate in the setting of VT aggravated by underlying AT/AFL (earlier during the admission). Device reprogrammed to VVI 80 this admission  -EP planning revision after discharge, will need lifevest           Hepatitis B core antibody positive since 2012              V-tach    - Recommend resuming Amio, NSVT on tele        Hyperlipidemia    -continue pravastatin        Essential hypertension    -Patient is pulsatile  -MAP goal 60-80 mmHg  -Blood pressure controlled over the last 24 hours  -Antihypertensive medications include Amlodipine and Hydralazine  Will monitor trends              CHF (NYHA class IV, ACC/AHA stage D)    -NICM  -Last 2D Echo 9/1/2017: LVEF 10-15%, LVEDD 6.3 cm  -Current diuretic regimen: po lasix BID  -2g Na dietary restriction, 1500 mL fluid restriction, strict I/Os              MELISSA Long  Heart Transplant  [facility]

## 2017-09-13 NOTE — PLAN OF CARE
Problem: Patient Care Overview  Goal: Plan of Care Review  Outcome: Ongoing (interventions implemented as appropriate)  Patient cooperative and pleasant with routine care and procedures.  Wife at bedside and participating in care and daily needs.  Patient resting quietly without complaints.  Will continue to monitor.

## 2017-09-13 NOTE — PLAN OF CARE
Problem: Physical Therapy Goal  Goal: Physical Therapy Goal  Goals to be met by: 10/9/17     Patient will increase functional independence with mobility by performin. Supine to sit with MInimal Assistance-  Met 2017  2. Sit to supine with MInimal Assistance - not met  3. Sit to stand transfer with Minimal Assistance- met       Updated: Sit to stand transfer with supervision using RW   4. Bed to chair transfer with Minimal Assistance- not met  5. Gait  x 50 feet with Minimal Assistance. -  Met 2017  6. Lower extremity exercise program x15 reps, with supervision, in order to increase LE strength and (I) with functional mobility. - not met  7.Pt mod I supine to sit- not met  8. Pt receive gait training ~ 220 ft with AD if needed and supervision- not met            Outcome: Ongoing (interventions implemented as appropriate)  No goals met this visit; continue current POC.     Tonja Vallecillo, PT, DPT   2017  Pager: [phone]

## 2017-09-13 NOTE — PROGRESS NOTES
Pt and wife ZANDERO.  Mrs. Hayden continues to be upset about everything and having to learn the alarms, now alarms on life vest.  She continues to have breakdowns with periods of crying.  Continued emotional support provided.  Mrs. Hayden was able to correctly tell me about alarms today including red heart alarm, what it is and what to do.  She is officially checked off on VAD education.  Encouraged her and pt to continue to work together on alarms. Both verbalized understanding and agreement.

## 2017-09-13 NOTE — PROGRESS NOTES
"   09/13/17 1400   Cognitive/Behavioral/Neuro   Level of Consciousness (AVPU) alert   Orientation oriented x 4   Speech clear/fluent;follows commands   Facial Symmetry equal right and left   Headache No   LUE no drift   RUE no drift   [place] Coma Scale   Best Eye Response 4-->(E4) spontaneous   Best Motor Response 6-->(M6) obeys commands   Best Verbal Response 5-->(V5) oriented   [place] Coma Scale Score 15   Assessment Qualifiers patient not sedated/intubated   Pupils   Pupil Size, Left 2 mm   Pupil Shape Left round   Pupil Reaction, Left equal;brisk   Pupil Accommodation Left normal response   Pupil Size, Right 2 mm   Pupil Shape Right round   Pupil Reaction, Right equal;brisk   Pupil Accommodation Right normal response   VSS. Neuro assessment WNL. Tonja, PT worried about patient physical mobility regression stating similar to signs of "ministroke" & stating believes started 09/10/2017. MELISSA Gupta updated- ordered CT head. Telephone readback x2. Liz LVAD coordinator updated.  "

## 2017-09-13 NOTE — NURSING TRANSFER
Nursing Transfer Note      9/13/2017     Transfer To: CT    Transfer via wheelchair    Transfer with cardiac monitoring    Transported by Claudette,RN    Medicines sent:  gtt & blood transfusion    Chart send with patient: Yes    Notified: spouse    AAOx3  Emergency equipment with patient.

## 2017-09-13 NOTE — PROCEDURES
TXP LVAD INTERROGATIONS 9/13/2017 9/13/2017 9/13/2017 9/13/2017 9/13/2017 9/12/2017 9/12/2017   Type HeartMate3 HeartMate3 HeartMate3 HeartMate3 HeartMate3 HeartMate3 HeartMate3   Flow 4.3 - 4.2 4.2 4.2 4.3 4.3   Speed 5200 - 5200 5200 5200 5200 5200   PI 3.6 - 3.6 3.6 3.8 3.8 3.6   Power ([place]) 3.6 - 3.5 3.5 3.6 3.5 3.6   LSL - - - - - - -   Pulsatility Pulse - Pulse Pulse Pulse Pulse -   Some recent data might be hidden     HCT= 21.0  No alarms noted in history  Pulsatile  VAD sounds HM 3

## 2017-09-13 NOTE — PT/OT/SLP PROGRESS
Occupational Therapy  Treatment    Suman Hayden   MRN: [id number]   Admitting Diagnosis: LVAD (left ventricular assist device) present    OT Date of Treatment: 09/13/17   OT Start Time: 1026  OT Stop Time: 1049  OT Total Time (min): 23 min    Billable Minutes:  Self Care/Home Management 13 and Therapeutic Activity 10    General Precautions: Standard, LVAD, fall  Orthopedic Precautions: N/A  Braces: N/A    Subjective:  Communicated with RN prior to session.    Pain/Comfort  Pain Rating 1: 0/10  Pain Rating Post-Intervention 1: 0/10    Objective:  Patient found with: telemetry, PICC line (LVAD to battery power, LifeVest)     Functional Mobility:  Bed Mobility:  Supine to Sit: Contact Guard Assistance    Transfers:  Sit <> Stand Assistance: Moderate Assistance from EOB and W/C  Sit <> Stand Assistive Device: No Assistive Device  Bed <> Chair Technique: Stand Pivot  Bed <> Chair Transfer Assistance: Maximum Assistance; pt with posterior lean throughout stand pivot  Bed <> Chair Assistive Device: No Assistive Device    Functional Ambulation: Few steps from EOB to W/C with Max A no AD    Activities of Daily Living:  UE Dressing Level of Assistance: Maximum assistance to don LVAD vest  LE Dressing Level of Assistance: Maximum assistance to thread pants over B LE and pull up/tie in standing; donned shoes with Min A     Balance:   Static Sit: GOOD+: Takes MAXIMAL challenges from all directions.    Dynamic Sit: GOOD: Maintains balance through MODERATE excursions of active trunk movement  Static Stand: POOR: Needs MODERATE assist to maintain  Dynamic stand: POOR: Max A    Therapeutic Activities and Exercises:  Pt supine in bed on battery power upon OT entry; CGA for supine to sit; on battery power already, required Max A to don vest and place batteries in pockets; Max A for LB dressing and stand pivot to W/C; pt with posterior lean and unable to correct with cues and assist; Mod A to stand from W/C and again with posterior lean  "and returned to seated position    AM-PAC 6 CLICK ADL   How much help from another person does this patient currently need?   1 = Unable, Total/Dependent Assistance  2 = A lot, Maximum/Moderate Assistance  3 = A little, Minimum/Contact Guard/Supervision  4 = None, Modified [place]/Independent    Putting on and taking off regular lower body clothing? : 2  Bathing (including washing, rinsing, drying)?: 2  Toileting, which includes using toilet, bedpan, or urinal? : 2  Putting on and taking off regular upper body clothing?: 3  Taking care of personal grooming such as brushing teeth?: 3  Eating meals?: 3  Total Score: 15     AM-PAC Raw Score CMS "G-Code Modifier Level of Impairment Assistance   6 % Total / Unable   7 - 8 CM 80 - 100% Maximal Assist   9-13 CL 60 - 80% Moderate Assist   14 - 19 CK 40 - 60% Moderate Assist   20 - 22 CJ 20 - 40% Minimal Assist   23 CI 1-20% SBA / CGA   24 CH 0% Independent/ Mod I       Patient left seated in W/C with all lines intact, call button in reach, RN notified, and wife present    ASSESSMENT:  Suman Hayden is a 67 y.o. male with a medical diagnosis of LVAD (left ventricular assist device) present and presents with deficits listed below. Pt continues to require Mod-Max A for T/Fs and difficulty maintaining upright posture; also requires assist with LVAD management and safety awareness. Pt would continue to benefit from OT to increase independence. Recommend rehab upon D/C.    Rehab identified problem list/impairments: Rehab identified problem list/impairments: weakness, impaired endurance, impaired self care skills, impaired functional mobilty, impaired balance, decreased safety awareness, impaired cardiopulmonary response to activity    Rehab potential is good.    Activity tolerance: Good    Discharge recommendations: Discharge Facility/Level Of Care Needs: rehabilitation facility     Barriers to discharge: Barriers to Discharge: None    Equipment recommendations: bedside " commode, rollator, wheelchair     GOALS:    Occupational Therapy Goals        Problem: Occupational Therapy Goal    Goal Priority Disciplines Outcome Interventions   Occupational Therapy Goal     OT, PT/OT Ongoing (interventions implemented as appropriate)    Description:  Goals to be met by:  2 weeks 9/25/17    Patient will increase functional independence with ADLs by performing:  Feeding: Independent   UE Dressing with Supervision.  LE Dressing with Supervision.  Grooming while standing with Supervision.  Toileting from toilet with Supervision for hygiene and clothing management.   Stand pivot transfers with Supervision.  Toilet transfer to toilet with Supervision.  Pt will be supervision with LVAD yasmin't.     Goals to be met by:  2 weeks 9/12/17    Patient will increase functional independence with ADLs by performing:  Feeding: Independent   UE Dressing with Supervision.  LE Dressing with Supervision.  Grooming while standing with Supervision.  Toileting from toilet with Supervision for hygiene and clothing management.   Stand pivot transfers with Supervision.  Toilet transfer to toilet with Supervision.  Pt will be supervision  with LVAD yasmin't.     Goals to be met by:  2 weeks 8/28/17    Patient will increase functional independence with ADLs by performing:  Feeding: Independent   UE Dressing with Supervision.  LE Dressing with Supervision.  Grooming while standing with Supervision.  Toileting from toilet with Supervision for hygiene and clothing management.   Stand pivot transfers with Supervision.  Toilet transfer to toilet with Supervision.  Pt will be supervision  with LVAD yasmin't.                    Multidisciplinary Problems (Resolved)        Problem: Occupational Therapy Goal    Goal Priority Disciplines Outcome Interventions   Occupational Therapy Goal   (Resolved)     OT, PT/OT  Error    Description:  Goals to be met by: 8/04/2017    Patient will increase functional independence with ADLs by  performing:    Increased functional strength to 5/5 for improved ADL performance.  Upper extremity exercise program and R LE ankle pumps  3x 10 reps per handout, with independence.                      Plan:  Patient to be seen 6 x/week to address the above listed problems via self-care/home management, therapeutic activities, therapeutic exercises  Plan of Care expires: 09/21/17  Plan of Care reviewed with: patient, spouse    DELMY Mohamud  09/13/2017

## 2017-09-13 NOTE — SUBJECTIVE & OBJECTIVE
Interval History: patient denies any complaints today, no dizziness    Continuous Infusions:   DOBUTamine 2.5 mcg/kg/min (09/13/17 1246)     Scheduled Meds:   amlodipine  10 mg Oral Daily    aspirin  81 mg Oral Daily    dronabinol  5 mg Oral BID    furosemide  40 mg Oral BID    hydrALAZINE  50 mg Oral Q8H    lidocaine  1 patch Transdermal Q24H    pantoprazole  40 mg Oral Daily    pravastatin  20 mg Oral QHS    sodium chloride 0.9%  10 mL Intravenous Q6H    warfarin  2.5 mg Oral Daily     PRN Meds:sodium chloride, albuterol sulfate, bisacodyl, ceFAZolin (ANCEF) IVPB, dextrose 50%, glucagon (human recombinant), hydrALAZINE, hydrocodone-acetaminophen 5-325mg, insulin aspart, ondansetron, Flushing PICC Protocol **AND** sodium chloride 0.9% **AND** sodium chloride 0.9%    Review of patient's allergies indicates:  No Known Allergies  Objective:     Vital Signs (Most Recent):  Temp: 97.8 °F (36.6 °C) (09/13/17 1330)  Pulse: 79 (09/13/17 1330)  Resp: 18 (09/13/17 1330)  BP: (!) 82/0 (09/13/17 1330)  SpO2: 97 % (09/13/17 1330) Vital Signs (24h Range):  Temp:  [97.4 °F (36.3 °C)-98.6 °F (37 °C)] 97.8 °F (36.6 °C)  Pulse:  [] 79  Resp:  [16-19] 18  SpO2:  [91 %-97 %] 97 %  BP: ()/(0-71) 82/0     Weight: 78.4 kg (172 lb 13.5 oz)  Body mass index is 26.28 kg/m².      Intake/Output Summary (Last 24 hours) at 09/13/17 1424  Last data filed at 09/13/17 0600   Gross per 24 hour   Intake           635.52 ml   Output              200 ml   Net           435.52 ml       Hemodynamic Parameters:         Physical Exam   Constitutional: He is oriented to person, place, and time. He appears well-developed and well-nourished.   HENT:   Head: Normocephalic and atraumatic.   Eyes: EOM are normal. Pupils are equal, round, and reactive to light.   Neck: Normal range of motion. Neck supple. JVD present.   Cardiovascular: Normal rate and regular rhythm.    No murmur heard.  VAD hum smooth   Pulmonary/Chest: Effort normal and  breath sounds normal.   Abdominal: Soft. Bowel sounds are normal. He exhibits no distension. There is no tenderness. There is no guarding.   Musculoskeletal: Normal range of motion. He exhibits no edema.   Neurological: He is alert and oriented to person, place, and time.   Skin: Skin is warm and dry.   Nursing note and vitals reviewed.      Significant Labs:  CBC:    Recent Labs  Lab 09/13/17 0407   WBC 7.32   RBC 2.40*   HGB 6.4*   HCT 20.5*      MCV 85   MCH 26.7*   MCHC 31.2*     BNP:    Recent Labs  Lab 09/13/17 0407   BNP 2,132*     CMP:    Recent Labs  Lab 09/13/17 0407   GLU 69*   CALCIUM 7.9*   ALBUMIN 2.2*   PROT 6.3      K 4.1   CO2 23      BUN 23   CREATININE 1.4   ALKPHOS 101   ALT 12   AST 19   BILITOT 1.1*      Coagulation:     Recent Labs  Lab 09/13/17 0407   INR 2.9*     LDH:    Recent Labs  Lab 09/10/17  1500 09/11/17  0522 09/12/17  0446 09/13/17  0407   * 244 250 275*     Microbiology:  Microbiology Results (last 7 days)     ** No results found for the last 168 hours. **          I have reviewed all pertinent labs within the past 24 hours.    Estimated Creatinine Clearance: 49.5 mL/min (based on SCr of 1.4 mg/dL).    Diagnostic Results:  I have reviewed and interpreted all pertinent imaging results/findings within the past 24 hours.

## 2017-09-13 NOTE — ASSESSMENT & PLAN NOTE
- Appropriate in the setting of VT aggravated by underlying AT/AFL (earlier during the admission). Device reprogrammed to VVI 80 this admission  -EP planning revision after discharge, will need lifevest

## 2017-09-13 NOTE — ASSESSMENT & PLAN NOTE
-NICM  -Last 2D Echo 9/1/2017: LVEF 10-15%, LVEDD 6.3 cm  -Current diuretic regimen: po lasix BID  -2g Na dietary restriction, 1500 mL fluid restriction, strict I/Os

## 2017-09-13 NOTE — ASSESSMENT & PLAN NOTE
-HeartMate 3 Implanted 7/27/2017 as DT   -s/p delayed chest closure (7/28/17) and extubated (7/29/17), reintubated 8/9/17 and extubated 8/13/17  -HTS primary, will wean  and transfuse one unit today   -Implanted by Dr. Downing  -Continue Coumadin, Goal INR 2.0-3.0.   -Antiplatelets : n/a  -LDH is stable overall today. Will continue to monitor daily.  -Speed set at 5200 rpm  -Interrogation notable for no events  -Not listed for OHTx

## 2017-09-13 NOTE — PT/OT/SLP PROGRESS
"Physical Therapy  Treatment    Suman Hayden   MRN: [id number]   Admitting Diagnosis: LVAD (left ventricular assist device) present    PT Received On: 09/13/17  PT Start Time: 1332     PT Stop Time: 1411    PT Total Time (min): 39 min       Billable Minutes:  Gait Training 20 min, Therapeutic Activity 10 min and Therapeutic Exercise 9 min    Treatment Type: Treatment  PT/PTA: PT     PTA Visit Number: 0       General Precautions: Standard, LVAD, fall  Orthopedic Precautions: N/A   Braces: N/A    Do you have any cultural, spiritual, Shinto conflicts, given your current situation?: none noted     Subjective:  Communicated with RN prior to session. Pt agreeable to participate in therapy session. Pt reported feeling "weak" today. Pt receiving blood transfusion- RN reported pt okay to participate in therapy. Pt presented today with signs of regression in progress with mobility, increased posterior lean, delayed processing of verbal and tactile instructions, and impaired gross motor coordination- RN notified immediately following session.     Pain/Comfort  Pain Rating 1: 0/10  Pain Rating Post-Intervention 1: 0/10    Objective:   Patient found with: telemetry, PICC line (LVAD to battery power; life vest)    Functional Mobility:  Bed Mobility:    N/T 2/2 pt up in chair upon PT arrival     Transfers:  Sit<>stand: From bedside chair x 5 trials; mod A x 2 for 4 trials; max A of 1 for 1 trial   Sit <> Stand Assistive Device: Rolling Walker    Gait:   Gait Distance: 18 ft. + 20 ft. + 32 ft. with RW; seated rest breaks between trials   Assistance 1: Moderate assistance, Maximum assistance (ranged from mod-max A due to posterior lean and difficulty with RW management)  Gait Assistive Device: Rolling walker  Gait Pattern: reciprocal  Gait Deviation(s): decreased hal, decreased step length, decreased stride length, decreased toe-to-floor clearance, decreased weight-shifting ability, backward lean (narrow base of " support)    Balance:   Static Sit: N/T 2/2 pt with chair support during sitting activities   Dynamic Sit: N/T 2/2 pt with chair support during sitting activities   Static Stand: POOR: fluctuated from requiring maximum to moderate assistance to maintain   Dynamic stand: POOR - :  fluctuated from requiring maximum to moderate assistance to maintain     Therapeutic Activities and Exercises:  Pt sitting up in chair with RN and wife present upon PT arrival. Therapy tech (Minnie) present throughout session for assistance. Therapist facilitated several trials of sit to stand from bedside chair with RW to improve independence and safety with functional tranfers. Pt required max verbal and tactile cueing for hand placement, sequencing of motor task, and body mechanics during transfers. Once standing, pt with severe posterior lean, requiring max A to shift weight anteriorly and widen base of support.     Therapist facilitated gait training over the course of several short trials, aimed to improve dynamic standing balance, gait mechanics, and endurance. Pt required increased cueing and assistance this visit for anterior weight shifting, increasing base of support, managing RW and coordinating BLE advancement. Pt reported feeling fatigued following gait trials. Chair follow and emergency bag present throughout. See above for details of gait training.     Therex for BLE strengthening: 2x10 performed in sitting   - LAMARYBETH   - marchdimitrios   * pt required frequent verbal and tactile cueing to coordinate LE movement and control speed of therex     AM-PAC 6 CLICK MOBILITY  How much help from another person does this patient currently need?   1 = Unable, Total/Dependent Assistance  2 = A lot, Maximum/Moderate Assistance  3 = A little, Minimum/Contact Guard/Supervision  4 = None, Modified [place]/Independent    Turning over in bed (including adjusting bedclothes, sheets and blankets)?: 3  Sitting down on and standing up from a chair with  arms (e.g., wheelchair, bedside commode, etc.): 2  Moving from lying on back to sitting on the side of the bed?: 3  Moving to and from a bed to a chair (including a wheelchair)?: 2  Need to walk in hospital room?: 2  Climbing 3-5 steps with a railing?: 1  Total Score: 13    AM-PAC Raw Score CMS G-Code Modifier Level of Impairment Assistance   6 % Total / Unable   7 - 9 CM 80 - 100% Maximal Assist   10 - 14 CL 60 - 80% Moderate Assist   15 - 19 CK 40 - 60% Moderate Assist   20 - 22 CJ 20 - 40% Minimal Assist   23 CI 1-20% SBA / CGA   24 CH 0% Independent/ Mod I     Patient left up in chair with all lines intact, call button in reach, RN notified and wife present.    Assessment:  Suman Hayden is a 67 y.o. male with a medical diagnosis of LVAD (left ventricular assist device) present. Pt presents with increased posterior lean in standing today, impaired gross motor coordination, difficulty managing RW, and gait instability. Pt also required increased assistance (assit of 2)  to perform sit<>stand transfers this visit.  Pt demonstrated good effort during therapy session, but required frequent cueing for reinforcement of instruction and education throughout session. Pt would benefit from continued PT intervention to address below listed deficits and maximize return to PLOF.       Rehab identified problem list/impairments: Rehab identified problem list/impairments: weakness, impaired endurance, gait instability, impaired functional mobilty, impaired self care skills, impaired balance, decreased lower extremity function, decreased safety awareness, impaired cardiopulmonary response to activity    Rehab potential is good.    Activity tolerance: Good    Discharge recommendations: Discharge Facility/Level Of Care Needs: rehabilitation facility     Barriers to discharge: Barriers to Discharge: None    Equipment recommendations: Equipment Needed After Discharge: bedside commode, wheelchair (rollator vs. RW pending  progress)     GOALS:    Physical Therapy Goals        Problem: Physical Therapy Goal    Goal Priority Disciplines Outcome Goal Variances Interventions   Physical Therapy Goal     PT/OT, PT Ongoing (interventions implemented as appropriate)     Description:  Goals to be met by: 10/9/17     Patient will increase functional independence with mobility by performin. Supine to sit with MInimal Assistance-  Met 2017  2. Sit to supine with MInimal Assistance - not met  3. Sit to stand transfer with Minimal Assistance- met       Updated: Sit to stand transfer with supervision using RW   4. Bed to chair transfer with Minimal Assistance- not met  5. Gait  x 50 feet with Minimal Assistance. -  Met 2017  6. Lower extremity exercise program x15 reps, with supervision, in order to increase LE strength and (I) with functional mobility. - not met  7.Pt mod I supine to sit- not met  8. Pt receive gait training ~ 220 ft with AD if needed and supervision- not met                             PLAN:    Patient to be seen 6 x/week  to address the above listed problems via gait training, therapeutic activities, therapeutic exercises, neuromuscular re-education  Plan of Care expires: 10/09/17  Plan of Care reviewed with: patient, spouse        Tonja Avel, PT, DPT   2017  Pager: [phone]

## 2017-09-14 LAB
ANION GAP SERPL CALC-SCNC: 9 MMOL/L
BASOPHILS # BLD AUTO: 0.05 K/UL
BASOPHILS NFR BLD: 0.7 %
BUN SERPL-MCNC: 24 MG/DL
CALCIUM SERPL-MCNC: 7.8 MG/DL
CHLORIDE SERPL-SCNC: 108 MMOL/L
CO2 SERPL-SCNC: 24 MMOL/L
CREAT SERPL-MCNC: 1.3 MG/DL
DIFFERENTIAL METHOD: ABNORMAL
EOSINOPHIL # BLD AUTO: 0.2 K/UL
EOSINOPHIL NFR BLD: 3 %
ERYTHROCYTE [DISTWIDTH] IN BLOOD BY AUTOMATED COUNT: 16.5 %
EST. GFR  (AFRICAN AMERICAN): >60 ML/MIN/1.73 M^2
EST. GFR  (NON AFRICAN AMERICAN): 56.5 ML/MIN/1.73 M^2
GLUCOSE SERPL-MCNC: 73 MG/DL
HCT VFR BLD AUTO: 24.3 %
HGB BLD-MCNC: 7.8 G/DL
INR PPP: 3.2
LDH SERPL L TO P-CCNC: 291 U/L
LYMPHOCYTES # BLD AUTO: 1.5 K/UL
LYMPHOCYTES NFR BLD: 19.6 %
MAGNESIUM SERPL-MCNC: 2.7 MG/DL
MCH RBC QN AUTO: 27.1 PG
MCHC RBC AUTO-ENTMCNC: 32.1 G/DL
MCV RBC AUTO: 84 FL
MONOCYTES # BLD AUTO: 0.6 K/UL
MONOCYTES NFR BLD: 8.5 %
NEUTROPHILS # BLD AUTO: 5 K/UL
NEUTROPHILS NFR BLD: 68.1 %
PHOSPHATE SERPL-MCNC: 3.2 MG/DL
PLATELET # BLD AUTO: 218 K/UL
PMV BLD AUTO: 10.1 FL
POTASSIUM SERPL-SCNC: 3.5 MMOL/L
PROTHROMBIN TIME: 32.4 SEC
RBC # BLD AUTO: 2.88 M/UL
SODIUM SERPL-SCNC: 141 MMOL/L
WBC # BLD AUTO: 7.39 K/UL

## 2017-09-14 PROCEDURE — 27000248 HC VAD-ADDITIONAL DAY

## 2017-09-14 PROCEDURE — 84100 ASSAY OF PHOSPHORUS: CPT

## 2017-09-14 PROCEDURE — 25000003 PHARM REV CODE 250: Performed by: INTERNAL MEDICINE

## 2017-09-14 PROCEDURE — 86644 CMV ANTIBODY: CPT

## 2017-09-14 PROCEDURE — 25000003 PHARM REV CODE 250: Performed by: PHYSICIAN ASSISTANT

## 2017-09-14 PROCEDURE — 25000003 PHARM REV CODE 250: Performed by: NURSE PRACTITIONER

## 2017-09-14 PROCEDURE — 20600001 HC STEP DOWN PRIVATE ROOM

## 2017-09-14 PROCEDURE — 97116 GAIT TRAINING THERAPY: CPT

## 2017-09-14 PROCEDURE — 63600175 PHARM REV CODE 636 W HCPCS: Performed by: NURSE PRACTITIONER

## 2017-09-14 PROCEDURE — 25000003 PHARM REV CODE 250: Performed by: THORACIC SURGERY (CARDIOTHORACIC VASCULAR SURGERY)

## 2017-09-14 PROCEDURE — 85025 COMPLETE CBC W/AUTO DIFF WBC: CPT

## 2017-09-14 PROCEDURE — 80048 BASIC METABOLIC PNL TOTAL CA: CPT

## 2017-09-14 PROCEDURE — 97530 THERAPEUTIC ACTIVITIES: CPT

## 2017-09-14 PROCEDURE — 83615 LACTATE (LD) (LDH) ENZYME: CPT

## 2017-09-14 PROCEDURE — 97535 SELF CARE MNGMENT TRAINING: CPT

## 2017-09-14 PROCEDURE — A4216 STERILE WATER/SALINE, 10 ML: HCPCS | Performed by: THORACIC SURGERY (CARDIOTHORACIC VASCULAR SURGERY)

## 2017-09-14 PROCEDURE — 93750 INTERROGATION VAD IN PERSON: CPT | Performed by: STUDENT IN AN ORGANIZED HEALTH CARE EDUCATION/TRAINING PROGRAM

## 2017-09-14 PROCEDURE — 83735 ASSAY OF MAGNESIUM: CPT

## 2017-09-14 PROCEDURE — 99232 SBSQ HOSP IP/OBS MODERATE 35: CPT | Mod: ,,, | Performed by: INTERNAL MEDICINE

## 2017-09-14 PROCEDURE — 85610 PROTHROMBIN TIME: CPT

## 2017-09-14 RX ORDER — WARFARIN 1 MG/1
1 TABLET ORAL DAILY
Status: DISCONTINUED | OUTPATIENT
Start: 2017-09-14 | End: 2017-09-15

## 2017-09-14 RX ORDER — POTASSIUM CHLORIDE 20 MEQ/1
40 TABLET, EXTENDED RELEASE ORAL ONCE
Status: COMPLETED | OUTPATIENT
Start: 2017-09-14 | End: 2017-09-14

## 2017-09-14 RX ADMIN — HYDRALAZINE HYDROCHLORIDE 50 MG: 50 TABLET ORAL at 02:09

## 2017-09-14 RX ADMIN — AMLODIPINE BESYLATE 10 MG: 10 TABLET ORAL at 08:09

## 2017-09-14 RX ADMIN — LIDOCAINE 1 PATCH: 50 PATCH TOPICAL at 12:09

## 2017-09-14 RX ADMIN — DRONABINOL 5 MG: 2.5 CAPSULE ORAL at 08:09

## 2017-09-14 RX ADMIN — Medication 10 ML: at 12:09

## 2017-09-14 RX ADMIN — HYDRALAZINE HYDROCHLORIDE 50 MG: 50 TABLET ORAL at 05:09

## 2017-09-14 RX ADMIN — PANTOPRAZOLE SODIUM 40 MG: 40 TABLET, DELAYED RELEASE ORAL at 08:09

## 2017-09-14 RX ADMIN — WARFARIN SODIUM 1 MG: 1 TABLET ORAL at 05:09

## 2017-09-14 RX ADMIN — HYDRALAZINE HYDROCHLORIDE 50 MG: 50 TABLET ORAL at 08:09

## 2017-09-14 RX ADMIN — FUROSEMIDE 40 MG: 40 TABLET ORAL at 08:09

## 2017-09-14 RX ADMIN — POTASSIUM CHLORIDE 40 MEQ: 1500 TABLET, EXTENDED RELEASE ORAL at 05:09

## 2017-09-14 RX ADMIN — PRAVASTATIN SODIUM 20 MG: 20 TABLET ORAL at 08:09

## 2017-09-14 RX ADMIN — ASPIRIN 81 MG: 81 TABLET, COATED ORAL at 08:09

## 2017-09-14 NOTE — SUBJECTIVE & OBJECTIVE
Interval History: Patient given blood yesterday and reports feeling better.  He is working with PT/OT but is still weak with ambulation       Continuous Infusions:   DOBUTamine 2.5 mcg/kg/min (09/13/17 1246)     Scheduled Meds:   amlodipine  10 mg Oral Daily    aspirin  81 mg Oral Daily    dronabinol  5 mg Oral BID    furosemide  40 mg Oral BID    hydrALAZINE  50 mg Oral Q8H    lidocaine  1 patch Transdermal Q24H    pantoprazole  40 mg Oral Daily    pravastatin  20 mg Oral QHS    sodium chloride 0.9%  10 mL Intravenous Q6H     PRN Meds:sodium chloride, albuterol sulfate, bisacodyl, ceFAZolin (ANCEF) IVPB, dextrose 50%, glucagon (human recombinant), hydrALAZINE, hydrocodone-acetaminophen 5-325mg, insulin aspart, ondansetron, Flushing PICC Protocol **AND** sodium chloride 0.9% **AND** sodium chloride 0.9%    Review of patient's allergies indicates:  No Known Allergies  Objective:     Vital Signs (Most Recent):  Temp: 97.5 °F (36.4 °C) (09/14/17 1229)  Pulse: 84 (09/14/17 1229)  Resp: 18 (09/14/17 1229)  BP: (!) 86/0 (09/14/17 1231)  SpO2: 96 % (09/14/17 1229) Vital Signs (24h Range):  Temp:  [97.4 °F (36.3 °C)-98.6 °F (37 °C)] 97.5 °F (36.4 °C)  Pulse:  [61-95] 84  Resp:  [18-19] 18  SpO2:  [96 %-99 %] 96 %  BP: ()/(0-72) 86/0     Weight: 79.4 kg (175 lb 0.7 oz)  Body mass index is 26.62 kg/m².      Intake/Output Summary (Last 24 hours) at 09/14/17 1407  Last data filed at 09/14/17 1200   Gross per 24 hour   Intake              660 ml   Output             1090 ml   Net             -430 ml     Physical Exam   Constitutional: He is oriented to person, place, and time. He appears well-developed and well-nourished.   HENT:   Head: Normocephalic and atraumatic.   Eyes: EOM are normal. Pupils are equal, round, and reactive to light.   Neck: Normal range of motion. Neck supple. JVD present.   Cardiovascular: Normal rate and regular rhythm.    No murmur heard.  VAD hum smooth   Pulmonary/Chest: Effort normal  and breath sounds normal.   Abdominal: Soft. Bowel sounds are normal. He exhibits no distension. There is no tenderness. There is no guarding.   Musculoskeletal: Normal range of motion. He exhibits no edema.   Neurological: He is alert and oriented to person, place, and time.   Skin: Skin is warm and dry.   Nursing note and vitals reviewed.      Significant Labs:  CBC:    Recent Labs  Lab 09/14/17  0534   WBC 7.39   RBC 2.88*   HGB 7.8*   HCT 24.3*      MCV 84   MCH 27.1   MCHC 32.1     BNP:    Recent Labs  Lab 09/13/17  0407   BNP 2,132*     CMP:    Recent Labs  Lab 09/13/17  0407 09/14/17  0534   GLU 69* 73   CALCIUM 7.9* 7.8*   ALBUMIN 2.2*  --    PROT 6.3  --     141   K 4.1 3.5   CO2 23 24    108   BUN 23 24*   CREATININE 1.4 1.3   ALKPHOS 101  --    ALT 12  --    AST 19  --    BILITOT 1.1*  --       Coagulation:     Recent Labs  Lab 09/14/17  0534   INR 3.2*     LDH:    Recent Labs  Lab 09/12/17  0446 09/13/17  0407 09/14/17  0534    275* 291*     Microbiology:  Microbiology Results (last 7 days)     ** No results found for the last 168 hours. **          I have reviewed all pertinent labs within the past 24 hours.    Estimated Creatinine Clearance: 53.3 mL/min (based on SCr of 1.3 mg/dL).    Diagnostic Results:  I have reviewed and interpreted all pertinent imaging results/findings within the past 24 hours.

## 2017-09-14 NOTE — PROGRESS NOTES
Pt and wife AAAO.  They did not attend support group today, but did manage to go outside for a while and called family members which was helpful to them.  Mr. Hayden is able to tell me that his yellow dimitry is 15 minutes of battery and red battery is 5 minutes of battery.  Strongly encouraged him and his wife to keep up the hard work and expressed how proud I am of them.  Emotional support provided.

## 2017-09-14 NOTE — PT/OT/SLP PROGRESS
Occupational Therapy  Treatment    Suman Hayden   MRN: [id number]   Admitting Diagnosis: LVAD (left ventricular assist device) present    OT Date of Treatment: 09/14/17   OT Start Time: 0946  OT Stop Time: 1025  OT Total Time (min): 39 min    Billable Minutes:  Self Care/Home Management 30 and Therapeutic Activity 9    General Precautions: Standard, LVAD, fall     Subjective:  Communicated with RN prior to session.  I am doing well.   Pain/Comfort  Pain Rating 1: 0/10    Objective:  Patient found sitting in wheelchair with: telemetry, PICC line (LVAD to battery power), Life vest      Functional Mobility:  Bed Mobility:   Activity did not occur     Transfers:   Sit <> Stand Assistance: Moderate Assistance  Sit <> Stand Assistive Device: No Assistive Device  Bed <> Chair Technique: Stand Pivot  Bed <> Chair Transfer Assistance: Moderate Assistance  Bed <> Chair Assistive Device: No Assistive Device  Toilet Transfer Technique: Stand Pivot  Toilet Transfer Assistance: Minimum Assistance  Toilet Transfer Assistive Device: bedside commode      Activities of Daily Living:  UE Dressing Level of Assistance: Maximum assistance  LE Dressing Level of Assistance: Maximum assistance  Grooming Position:  standing at the sink   Grooming Level of Assistance: Supervision  Toileting Where Assessed: Bedside commode  Toileting Level of Assistance: Minimum assistance         Balance:   Static Sit: GOOD  Dynamic Sit: GOOD  Static Stand: FAIR  Dynamic stand: Poor plus     Therapeutic Activities and Exercises:  Pt educated on role of OT, plan of care, safety awareness, DME, importance of out of bed activity, energy conservation techniques   Sit to stand x4, bed to chair, knee raises in standing 3x10   AM-PAC 6 CLICK ADL   How much help from another person does this patient currently need?   1 = Unable, Total/Dependent Assistance  2 = A lot, Maximum/Moderate Assistance  3 = A little, Minimum/Contact Guard/Supervision  4 = None, Modified  "[place]/Independent    Putting on and taking off regular lower body clothing? : 2  Bathing (including washing, rinsing, drying)?: 2  Toileting, which includes using toilet, bedpan, or urinal? : 2  Putting on and taking off regular upper body clothing?: 3  Taking care of personal grooming such as brushing teeth?: 3  Eating meals?: 3  Total Score: 15     AM-PAC Raw Score CMS "G-Code Modifier Level of Impairment Assistance   6 % Total / Unable   7 - 8 CM 80 - 100% Maximal Assist   9-13 CL 60 - 80% Moderate Assist   14 - 19 CK 40 - 60% Moderate Assist   20 - 22 CJ 20 - 40% Minimal Assist   23 CI 1-20% SBA / CGA   24 CH 0% Independent/ Mod I       Patient left up in chair with all lines intact, call button in reach, RN notified and spouse  present    ASSESSMENT:  Suman Hayden is a 67 y.o. male with a medical diagnosis of LVAD (left ventricular assist device) present and presents with decreased activity tolerance. Pt progressing with ADL/ Self care this visit. Continue OT plan of care     Rehab identified problem list/impairments: Rehab identified problem list/impairments: weakness, impaired endurance, impaired self care skills, impaired functional mobilty, impaired balance, impaired cardiopulmonary response to activity, gait instability    Rehab potential is good.    Activity tolerance: Good    Discharge recommendations: Discharge Facility/Level Of Care Needs: rehabilitation facility     Barriers to discharge: Barriers to Discharge: None    Equipment recommendations: bedside commode, walker, rolling, wheelchair     GOALS:    Occupational Therapy Goals        Problem: Occupational Therapy Goal    Goal Priority Disciplines Outcome Interventions   Occupational Therapy Goal     OT, PT/OT Ongoing (interventions implemented as appropriate)    Description:  Goals to be met by:  2 weeks 9/25/17    Patient will increase functional independence with ADLs by performing:  Feeding: Independent   UE Dressing with " Supervision.  LE Dressing with Supervision.  Grooming while standing with Supervision.  Toileting from toilet with Supervision for hygiene and clothing management.   Stand pivot transfers with Supervision.  Toilet transfer to toilet with Supervision.  Pt will be supervision with LVAD yasmin't.     Goals to be met by:  2 weeks 9/12/17    Patient will increase functional independence with ADLs by performing:  Feeding: Independent   UE Dressing with Supervision.  LE Dressing with Supervision.  Grooming while standing with Supervision.  Toileting from toilet with Supervision for hygiene and clothing management.   Stand pivot transfers with Supervision.  Toilet transfer to toilet with Supervision.  Pt will be supervision  with LVAD yasmin't.     Goals to be met by:  2 weeks 8/28/17    Patient will increase functional independence with ADLs by performing:  Feeding: Independent   UE Dressing with Supervision.  LE Dressing with Supervision.  Grooming while standing with Supervision.  Toileting from toilet with Supervision for hygiene and clothing management.   Stand pivot transfers with Supervision.  Toilet transfer to toilet with Supervision.  Pt will be supervision  with LVAD yasmin't.                    Multidisciplinary Problems (Resolved)        Problem: Occupational Therapy Goal    Goal Priority Disciplines Outcome Interventions   Occupational Therapy Goal   (Resolved)     OT, PT/OT  Error    Description:  Goals to be met by: 8/04/2017    Patient will increase functional independence with ADLs by performing:    Increased functional strength to 5/5 for improved ADL performance.  Upper extremity exercise program and R LE ankle pumps  3x 10 reps per handout, with independence.                      Plan:  Patient to be seen 6 x/week to address the above listed problems via self-care/home management, therapeutic activities, therapeutic exercises  Plan of Care expires: 09/21/17  Plan of Care reviewed with: patient,  spouse         Beatriz Dubose, OT  09/14/2017

## 2017-09-14 NOTE — ASSESSMENT & PLAN NOTE
-HeartMate 3 Implanted 7/27/2017 as DT   -s/p delayed chest closure (7/28/17) and extubated (7/29/17), reintubated 8/9/17 and extubated 8/13/17  -HTS primary, given one unit of blood yesterday and weaned   -Implanted by Dr. Downing  -Continue Coumadin, Goal INR 2.0-3.0.   -Antiplatelets : n/a  -LDH is stable overall today. Will continue to monitor daily.  -Speed set at 5200 rpm  -Interrogation notable for no events  -Not listed for OHTx

## 2017-09-14 NOTE — PLAN OF CARE
Problem: Occupational Therapy Goal  Goal: Occupational Therapy Goal  Goals to be met by:  2 weeks 9/25/17    Patient will increase functional independence with ADLs by performing:  Feeding: Independent   UE Dressing with Supervision.  LE Dressing with Supervision.  Grooming while standing with Supervision.  Toileting from toilet with Supervision for hygiene and clothing management.   Stand pivot transfers with Supervision.  Toilet transfer to toilet with Supervision.  Pt will be supervision with LVAD yasmin't.     Goals to be met by:  2 weeks 9/12/17    Patient will increase functional independence with ADLs by performing:  Feeding: Independent   UE Dressing with Supervision.  LE Dressing with Supervision.  Grooming while standing with Supervision.  Toileting from toilet with Supervision for hygiene and clothing management.   Stand pivot transfers with Supervision.  Toilet transfer to toilet with Supervision.  Pt will be supervision  with LVAD yasmin't.     Goals to be met by:  2 weeks 8/28/17    Patient will increase functional independence with ADLs by performing:  Feeding: Independent   UE Dressing with Supervision.  LE Dressing with Supervision.  Grooming while standing with Supervision.  Toileting from toilet with Supervision for hygiene and clothing management.   Stand pivot transfers with Supervision.  Toilet transfer to toilet with Supervision.  Pt will be supervision  with LVAD yasmin't.         Outcome: Ongoing (interventions implemented as appropriate)  Continue OT plan care.

## 2017-09-14 NOTE — PROGRESS NOTES
[facility]  Heart Transplant  Progress Note    Patient Name: Suman Hayden  MRN: [id number]  Admission Date: 7/18/2017  Hospital Length of Stay: 58 days  Attending Physician: Deejay Duff Jr.,*  Primary Care Provider: Joe Ernst MD  Principal Problem:LVAD (left ventricular assist device) present    Subjective:     Interval History: Patient given blood yesterday and reports feeling better.  He is working with PT/OT but is still weak with ambulation       Continuous Infusions:   DOBUTamine 2.5 mcg/kg/min (09/13/17 1246)     Scheduled Meds:   amlodipine  10 mg Oral Daily    aspirin  81 mg Oral Daily    dronabinol  5 mg Oral BID    furosemide  40 mg Oral BID    hydrALAZINE  50 mg Oral Q8H    lidocaine  1 patch Transdermal Q24H    pantoprazole  40 mg Oral Daily    pravastatin  20 mg Oral QHS    sodium chloride 0.9%  10 mL Intravenous Q6H     PRN Meds:sodium chloride, albuterol sulfate, bisacodyl, ceFAZolin (ANCEF) IVPB, dextrose 50%, glucagon (human recombinant), hydrALAZINE, hydrocodone-acetaminophen 5-325mg, insulin aspart, ondansetron, Flushing PICC Protocol **AND** sodium chloride 0.9% **AND** sodium chloride 0.9%    Review of patient's allergies indicates:  No Known Allergies  Objective:     Vital Signs (Most Recent):  Temp: 97.5 °F (36.4 °C) (09/14/17 1229)  Pulse: 84 (09/14/17 1229)  Resp: 18 (09/14/17 1229)  BP: (!) 86/0 (09/14/17 1231)  SpO2: 96 % (09/14/17 1229) Vital Signs (24h Range):  Temp:  [97.4 °F (36.3 °C)-98.6 °F (37 °C)] 97.5 °F (36.4 °C)  Pulse:  [61-95] 84  Resp:  [18-19] 18  SpO2:  [96 %-99 %] 96 %  BP: ()/(0-72) 86/0     Weight: 79.4 kg (175 lb 0.7 oz)  Body mass index is 26.62 kg/m².      Intake/Output Summary (Last 24 hours) at 09/14/17 1407  Last data filed at 09/14/17 1200   Gross per 24 hour   Intake              660 ml   Output             1090 ml   Net             -430 ml     Physical Exam   Constitutional: He is oriented to person, place, and  time. He appears well-developed and well-nourished.   HENT:   Head: Normocephalic and atraumatic.   Eyes: EOM are normal. Pupils are equal, round, and reactive to light.   Neck: Normal range of motion. Neck supple. JVD present.   Cardiovascular: Normal rate and regular rhythm.    No murmur heard.  VAD hum smooth   Pulmonary/Chest: Effort normal and breath sounds normal.   Abdominal: Soft. Bowel sounds are normal. He exhibits no distension. There is no tenderness. There is no guarding.   Musculoskeletal: Normal range of motion. He exhibits no edema.   Neurological: He is alert and oriented to person, place, and time.   Skin: Skin is warm and dry.   Nursing note and vitals reviewed.      Significant Labs:  CBC:    Recent Labs  Lab 09/14/17  0534   WBC 7.39   RBC 2.88*   HGB 7.8*   HCT 24.3*      MCV 84   MCH 27.1   MCHC 32.1     BNP:    Recent Labs  Lab 09/13/17  0407   BNP 2,132*     CMP:    Recent Labs  Lab 09/13/17  0407 09/14/17  0534   GLU 69* 73   CALCIUM 7.9* 7.8*   ALBUMIN 2.2*  --    PROT 6.3  --     141   K 4.1 3.5   CO2 23 24    108   BUN 23 24*   CREATININE 1.4 1.3   ALKPHOS 101  --    ALT 12  --    AST 19  --    BILITOT 1.1*  --       Coagulation:     Recent Labs  Lab 09/14/17  0534   INR 3.2*     LDH:    Recent Labs  Lab 09/12/17  0446 09/13/17 0407 09/14/17  0534    275* 291*     Microbiology:  Microbiology Results (last 7 days)     ** No results found for the last 168 hours. **          I have reviewed all pertinent labs within the past 24 hours.    Estimated Creatinine Clearance: 53.3 mL/min (based on SCr of 1.3 mg/dL).    Diagnostic Results:  I have reviewed and interpreted all pertinent imaging results/findings within the past 24 hours.    Assessment and Plan:     * LVAD (left ventricular assist device) present    -HeartMate 3 Implanted 7/27/2017 as DT   -s/p delayed chest closure (7/28/17) and extubated (7/29/17), reintubated 8/9/17 and extubated 8/13/17  -HTS primary,  given one unit of blood yesterday and weaned   -Implanted by Dr. Downing  -Continue Coumadin, Goal INR 2.0-3.0.   -Antiplatelets : n/a  -LDH is stable overall today. Will continue to monitor daily.  -Speed set at 5200 rpm  -Interrogation notable for no events  -Not listed for OHTx           Bacteremia    - ESBL bacteremia.Cont ertapenem. Per ID- continue ertapenem x 4-6 weeks from 8/11. Coarse completed   - blood cultures from 8/29 NGTD          Essential hypertension    -Patient is pulsatile  -MAP goal 60-80 mmHg  -Blood pressure controlled over the last 24 hours  -Antihypertensive medications include Amlodipine and Hydralazine  Will monitor trends              V-tach    - May resume Amiodarone          CHF (NYHA class IV, ACC/AHA stage D)    -NICM  -Last 2D Echo 9/1/2017: LVEF 10-15%, LVEDD 6.3 cm  -Current diuretic regimen: po lasix BID  -2g Na dietary restriction, 1500 mL fluid restriction, strict I/Os          Hyperlipidemia    -continue pravastatin        Hepatitis B core antibody positive since 2012              Stage III pressure ulcer of sacral region    - wound care        Atrial fibrillation    -AC, may consider resuming Amiodarone         Hyperglycemia    -per primary team        Atrial tachycardia    - [id number] - 3  -Rec restarting Amio. AC per CTS        AICD discharge    - Appropriate in the setting of VT aggravated by underlying AT/AFL (earlier during the admission). Device reprogrammed to VVI 80 this admission  -EP planning revision after discharge, will need lifevest               MELISSA Jon  Heart Transplant spectralink: [id number]  [facility]

## 2017-09-14 NOTE — PT/OT/SLP PROGRESS
Physical Therapy  Treatment    Suman Hayden   MRN: [id number]   Admitting Diagnosis: LVAD (left ventricular assist device) present    PT Received On: 09/14/17  PT Start Time: 0852     PT Stop Time: 0940    PT Total Time (min): 48 min       Billable Minutes:  Gait [id number] and Therapeutic Activity 23     Treatment Type: Treatment  PT/PTA: PT     PTA Visit Number: 0       General Precautions: Standard, LVAD, fall  Orthopedic Precautions: N/A   Braces: N/A    Do you have any cultural, spiritual, Gnosticism conflicts, given your current situation?: none noted     Subjective:  Communicated with RN prior to session. Pt agreeable to participate in therapy session. Pt's wife at bedside.     Pain/Comfort  Pain Rating 1: 0/10  Pain Rating Post-Intervention 1: 0/10    Objective:   Patient found with: telemetry, PICC line (LVAD to wall power)    Functional Mobility:  Bed Mobility:   Supine to Sit: Stand by Assistance (with HOB elevated)    Transfers:  Sit <> Stand Assistance: Minimum Assistance (from EOB x 1 trial; from bedside chair x 3 trials)  Sit <> Stand Assistive Device: Rolling Walker  Toilet Transfer Assistance: Moderate Assistance  Toilet Transfer Assistive Device: Rolling Walker    Gait:   *Pt performed first trial of gait within room from bed<>bathroom ~20 ft with RW and min A for balance, safety, and line management.   *Therapist then facilitated gait within hallway x 400 ft. With RW; pt required 2 seated rest breaks during gait training.      Assistance: Minimum assistance for balance, RW management, and cueing for scanning environment for obstacles  (with assist of 2 for chair follow due to fall risk)  Gait Assistive Device: Rolling walker  Gait Pattern: reciprocal  Gait Deviation(s): decreased hal, decreased step length, decreased stride length, decreased toe-to-floor clearance, narrow base of support    Balance:   Static Sit: GOOD: Takes MODERATE challenges from all directions  Dynamic Sit: GOOD-: Maintains  balance through MODERATE excursions of active trunk movement,     Static Stand: FAIR: Maintains without assist but unable to take challenges  Dynamic stand: POOR: requires min A during gait     Therapeutic Activities and Exercises:  Pt supine upon PT arrival, with LVAD connected to wall power. Pt transitioned from LVAD wall power to battery power with assistance from wife and cueing from therapist and caregiver to perform self-test. Pt and wife educated on safety awareness with LVAD management, management of multiple medical lines, and fall risk.     Therapist facilitated progression of gait training to improve gait stability, endurance, and independence with functional ambulation.  Chair follow and emergency bag present throughout. Pt required less frequent rest breaks and demonstrated improvement in posterior lean and gross motor coordination this visit. Pt provided with verbal and tactile cueing for postural awareness, RW management, balance, and step length.     AM-PAC 6 CLICK MOBILITY  How much help from another person does this patient currently need?   1 = Unable, Total/Dependent Assistance  2 = A lot, Maximum/Moderate Assistance  3 = A little, Minimum/Contact Guard/Supervision  4 = None, Modified [place]/Independent    Turning over in bed (including adjusting bedclothes, sheets and blankets)?: 3  Sitting down on and standing up from a chair with arms (e.g., wheelchair, bedside commode, etc.): 3  Moving from lying on back to sitting on the side of the bed?: 3  Moving to and from a bed to a chair (including a wheelchair)?: 3  Need to walk in hospital room?: 2  Climbing 3-5 steps with a railing?: 1  Total Score: 15    AM-PAC Raw Score CMS G-Code Modifier Level of Impairment Assistance   6 % Total / Unable   7 - 9 CM 80 - 100% Maximal Assist   10 - 14 CL 60 - 80% Moderate Assist   15 - 19 CK 40 - 60% Moderate Assist   20 - 22 CJ 20 - 40% Minimal Assist   23 CI 1-20% SBA / CGA   24 CH 0% Independent/  Mod I     Patient left up in chair with all lines intact, call button in reach, RN notified and wife present.    Assessment:  Suman Hayden is a 67 y.o. male with a medical diagnosis of LVAD (left ventricular assist device) present. Pt demonstrated significant improvement with gait stability and endurance compared to last treatment session. Pt able to increase gait distance to 400 ft. Total with 2 seated rest breaks as needed during trial. Pt with decreased posterior lean and able to perform transfers with less frequent cueing for sequencing of motor tasks. Pt continues to present with deconditioning and is not yet at PLOF. Pt would benefit from continued PT intervention to address below listed deficits and maximize return to PLOF.       Rehab identified problem list/impairments: Rehab identified problem list/impairments: weakness, impaired endurance, gait instability, impaired functional mobilty, impaired balance, impaired self care skills, decreased safety awareness, impaired cardiopulmonary response to activity    Rehab potential is good.    Activity tolerance: Good    Discharge recommendations: Discharge Facility/Level Of Care Needs: rehabilitation facility     Barriers to discharge: Barriers to Discharge: None    Equipment recommendations: Equipment Needed After Discharge: bedside commode, walker, rolling, wheelchair     GOALS:    Physical Therapy Goals        Problem: Physical Therapy Goal    Goal Priority Disciplines Outcome Goal Variances Interventions   Physical Therapy Goal     PT/OT, PT Ongoing (interventions implemented as appropriate)     Description:  Goals to be met by: 10/9/17     Patient will increase functional independence with mobility by performin. Supine to sit with MInimal Assistance-  Met 2017  2. Sit to supine with MInimal Assistance - not met  3. Sit to stand transfer with Minimal Assistance- met       Updated: Sit to stand transfer with supervision using RW   4. Bed to chair  transfer with Minimal Assistance- not met  5. Gait  x 50 feet with Minimal Assistance. -  Met 8/29/2017  6. Lower extremity exercise program x15 reps, with supervision, in order to increase LE strength and (I) with functional mobility. - not met  7.Pt mod I supine to sit- not met  8. Pt receive gait training ~ 220 ft with AD if needed and supervision- not met                             PLAN:    Patient to be seen 6 x/week  to address the above listed problems via gait training, therapeutic activities, therapeutic exercises, neuromuscular re-education  Plan of Care expires: 10/09/17  Plan of Care reviewed with: patient, spouse        Tonjamichael Vallecillo, PT, DPT   9/14/2017  Pager: [phone]

## 2017-09-14 NOTE — PROCEDURES
TXP LVAD INTERROGATIONS 9/14/2017 9/14/2017 9/14/2017 9/14/2017 9/14/2017 9/13/2017 9/13/2017   Type HeartMate3 HeartMate3 HeartMate3 HeartMate3 HeartMate3 HeartMate3 HeartMate3   Flow 4.2 4.4 - 4.2 4.3 4.2 4.4   Speed 5200 5250 - 5200 5200 5200 5200   PI 3.6 3.6 - 3.4 3.6 3.6 3.6   Power ([place]) 3.5 3.5 - 3.5 3.6 3.7 3.6   LSL - - - - - 4800 -   Pulsatility Intermittent pulse Pulse - - - - Pulse   Some recent data might be hidden     VAD sounds HM 3  HCT 24.3  No alarms noted in history  Pulsatile

## 2017-09-14 NOTE — PLAN OF CARE
Problem: Physical Therapy Goal  Goal: Physical Therapy Goal  Goals to be met by: 10/9/17     Patient will increase functional independence with mobility by performin. Supine to sit with MInimal Assistance-  Met 2017  2. Sit to supine with MInimal Assistance - not met  3. Sit to stand transfer with Minimal Assistance- met       Updated: Sit to stand transfer with supervision using RW   4. Bed to chair transfer with Minimal Assistance- not met  5. Gait  x 50 feet with Minimal Assistance. -  Met 2017  6. Lower extremity exercise program x15 reps, with supervision, in order to increase LE strength and (I) with functional mobility. - not met  7.Pt mod I supine to sit- not met  8. Pt receive gait training ~ 220 ft with AD if needed and supervision- not met            Outcome: Ongoing (interventions implemented as appropriate)  Pt progressing towards goals; continue current POC.     Tonja Vallecillo, PT, DPT   2017  Pager: [phone]

## 2017-09-14 NOTE — PLAN OF CARE
Problem: Patient Care Overview  Goal: Plan of Care Review  Outcome: Ongoing (interventions implemented as appropriate)  Pt remained stable throughout the night. No acute distress noted. Pt remained free from injury. VSS. Pt denies any chest pain or SOB. PRACHI PICC,  2.5mcg. Life vest on, ICD lead revision planned for outpt. Pt understands plan of care. Will continue to monitor.

## 2017-09-15 LAB
ALBUMIN SERPL BCP-MCNC: 2.2 G/DL
ALP SERPL-CCNC: 90 U/L
ALT SERPL W/O P-5'-P-CCNC: 12 U/L
ANION GAP SERPL CALC-SCNC: 8 MMOL/L
AST SERPL-CCNC: 18 U/L
BASOPHILS # BLD AUTO: 0.04 K/UL
BASOPHILS NFR BLD: 0.7 %
BILIRUB DIRECT SERPL-MCNC: 0.7 MG/DL
BILIRUB SERPL-MCNC: 1.2 MG/DL
BNP SERPL-MCNC: 1756 PG/ML
BUN SERPL-MCNC: 23 MG/DL
CALCIUM SERPL-MCNC: 7.8 MG/DL
CHLORIDE SERPL-SCNC: 105 MMOL/L
CO2 SERPL-SCNC: 27 MMOL/L
CREAT SERPL-MCNC: 1.3 MG/DL
CRP SERPL-MCNC: 13.4 MG/L
DIFFERENTIAL METHOD: ABNORMAL
EOSINOPHIL # BLD AUTO: 0.2 K/UL
EOSINOPHIL NFR BLD: 3.3 %
ERYTHROCYTE [DISTWIDTH] IN BLOOD BY AUTOMATED COUNT: 16.4 %
EST. GFR  (AFRICAN AMERICAN): >60 ML/MIN/1.73 M^2
EST. GFR  (NON AFRICAN AMERICAN): 56.5 ML/MIN/1.73 M^2
GLUCOSE SERPL-MCNC: 61 MG/DL
HCT VFR BLD AUTO: 24.1 %
HGB BLD-MCNC: 7.7 G/DL
INR PPP: 3.4
LDH SERPL L TO P-CCNC: 261 U/L
LYMPHOCYTES # BLD AUTO: 1.2 K/UL
LYMPHOCYTES NFR BLD: 20 %
MAGNESIUM SERPL-MCNC: 2.4 MG/DL
MCH RBC QN AUTO: 27.7 PG
MCHC RBC AUTO-ENTMCNC: 32 G/DL
MCV RBC AUTO: 87 FL
MONOCYTES # BLD AUTO: 0.6 K/UL
MONOCYTES NFR BLD: 10.2 %
NEUTROPHILS # BLD AUTO: 3.9 K/UL
NEUTROPHILS NFR BLD: 65.6 %
PHOSPHATE SERPL-MCNC: 3.3 MG/DL
PLATELET # BLD AUTO: 220 K/UL
PMV BLD AUTO: 10.2 FL
POTASSIUM SERPL-SCNC: 3.3 MMOL/L
PREALB SERPL-MCNC: 11 MG/DL
PROT SERPL-MCNC: 6.3 G/DL
PROTHROMBIN TIME: 34.5 SEC
RBC # BLD AUTO: 2.78 M/UL
SODIUM SERPL-SCNC: 140 MMOL/L
WBC # BLD AUTO: 5.99 K/UL

## 2017-09-15 PROCEDURE — 63600175 PHARM REV CODE 636 W HCPCS: Performed by: PHYSICIAN ASSISTANT

## 2017-09-15 PROCEDURE — 63600175 PHARM REV CODE 636 W HCPCS: Performed by: NURSE PRACTITIONER

## 2017-09-15 PROCEDURE — 83735 ASSAY OF MAGNESIUM: CPT

## 2017-09-15 PROCEDURE — 93750 INTERROGATION VAD IN PERSON: CPT | Performed by: STUDENT IN AN ORGANIZED HEALTH CARE EDUCATION/TRAINING PROGRAM

## 2017-09-15 PROCEDURE — 25000003 PHARM REV CODE 250: Performed by: INTERNAL MEDICINE

## 2017-09-15 PROCEDURE — 84134 ASSAY OF PREALBUMIN: CPT

## 2017-09-15 PROCEDURE — 80076 HEPATIC FUNCTION PANEL: CPT

## 2017-09-15 PROCEDURE — 84100 ASSAY OF PHOSPHORUS: CPT

## 2017-09-15 PROCEDURE — 93750 INTERROGATION VAD IN PERSON: CPT | Mod: ,,, | Performed by: INTERNAL MEDICINE

## 2017-09-15 PROCEDURE — 83880 ASSAY OF NATRIURETIC PEPTIDE: CPT

## 2017-09-15 PROCEDURE — 27000248 HC VAD-ADDITIONAL DAY

## 2017-09-15 PROCEDURE — 85025 COMPLETE CBC W/AUTO DIFF WBC: CPT

## 2017-09-15 PROCEDURE — A4216 STERILE WATER/SALINE, 10 ML: HCPCS | Performed by: THORACIC SURGERY (CARDIOTHORACIC VASCULAR SURGERY)

## 2017-09-15 PROCEDURE — 83615 LACTATE (LD) (LDH) ENZYME: CPT

## 2017-09-15 PROCEDURE — 25000003 PHARM REV CODE 250: Performed by: PHYSICIAN ASSISTANT

## 2017-09-15 PROCEDURE — 85610 PROTHROMBIN TIME: CPT

## 2017-09-15 PROCEDURE — 25000003 PHARM REV CODE 250: Performed by: THORACIC SURGERY (CARDIOTHORACIC VASCULAR SURGERY)

## 2017-09-15 PROCEDURE — 99232 SBSQ HOSP IP/OBS MODERATE 35: CPT | Mod: ,,, | Performed by: INTERNAL MEDICINE

## 2017-09-15 PROCEDURE — 97116 GAIT TRAINING THERAPY: CPT

## 2017-09-15 PROCEDURE — 20600001 HC STEP DOWN PRIVATE ROOM

## 2017-09-15 PROCEDURE — 80048 BASIC METABOLIC PNL TOTAL CA: CPT

## 2017-09-15 PROCEDURE — 86140 C-REACTIVE PROTEIN: CPT

## 2017-09-15 PROCEDURE — 25000003 PHARM REV CODE 250: Performed by: NURSE PRACTITIONER

## 2017-09-15 RX ADMIN — Medication 10 ML: at 12:09

## 2017-09-15 RX ADMIN — DOBUTAMINE IN DEXTROSE 2.5 MCG/KG/MIN: 200 INJECTION, SOLUTION INTRAVENOUS at 12:09

## 2017-09-15 RX ADMIN — FUROSEMIDE 40 MG: 40 TABLET ORAL at 08:09

## 2017-09-15 RX ADMIN — HYDRALAZINE HYDROCHLORIDE 50 MG: 50 TABLET ORAL at 11:09

## 2017-09-15 RX ADMIN — DRONABINOL 5 MG: 2.5 CAPSULE ORAL at 07:09

## 2017-09-15 RX ADMIN — PRAVASTATIN SODIUM 20 MG: 20 TABLET ORAL at 08:09

## 2017-09-15 RX ADMIN — FUROSEMIDE 40 MG: 40 TABLET ORAL at 07:09

## 2017-09-15 RX ADMIN — HYDRALAZINE HYDROCHLORIDE 50 MG: 50 TABLET ORAL at 06:09

## 2017-09-15 RX ADMIN — ASPIRIN 81 MG: 81 TABLET, COATED ORAL at 08:09

## 2017-09-15 RX ADMIN — PANTOPRAZOLE SODIUM 40 MG: 40 TABLET, DELAYED RELEASE ORAL at 08:09

## 2017-09-15 RX ADMIN — DRONABINOL 5 MG: 2.5 CAPSULE ORAL at 08:09

## 2017-09-15 RX ADMIN — Medication 10 ML: at 06:09

## 2017-09-15 RX ADMIN — PRAVASTATIN SODIUM 20 MG: 20 TABLET ORAL at 07:09

## 2017-09-15 RX ADMIN — AMLODIPINE BESYLATE 10 MG: 10 TABLET ORAL at 08:09

## 2017-09-15 NOTE — PROGRESS NOTES
Plan of care discussed with patient and patient wife at bedside.  Patient ambulating with assitance, fall precautions in place.Continuing to encourage sternal precautions, IS, and ambulation. LVAD DP and numbers WNL, smooth LVAD hum. Patient has no complaints of pain. Discussed medications and care. Encouraged workbook, reviewed education, filled in binder. Patient wife checked off on alarms 09/13/17. H&H stable today. Electrolytes replaced per order. Patient has no questions at this time. Will continue to monitor.

## 2017-09-15 NOTE — SUBJECTIVE & OBJECTIVE
Interval History: Patient reports he is feeling better everyday.  His appetite has improved and he feels like his balance is getting better.   Wife at bedside and is checked off on VAD alarms.     Continuous Infusions:   DOBUTamine 2.5 mcg/kg/min (09/15/17 1230)     Scheduled Meds:   amlodipine  10 mg Oral Daily    aspirin  81 mg Oral Daily    dronabinol  5 mg Oral BID    furosemide  40 mg Oral BID    hydrALAZINE  50 mg Oral Q8H    lidocaine  1 patch Transdermal Q24H    pantoprazole  40 mg Oral Daily    pravastatin  20 mg Oral QHS    sodium chloride 0.9%  10 mL Intravenous Q6H     PRN Meds:sodium chloride, albuterol sulfate, bisacodyl, ceFAZolin (ANCEF) IVPB, dextrose 50%, glucagon (human recombinant), hydrocodone-acetaminophen 5-325mg, insulin aspart, ondansetron, Flushing PICC Protocol **AND** sodium chloride 0.9% **AND** sodium chloride 0.9%    Review of patient's allergies indicates:  No Known Allergies  Objective:     Vital Signs (Most Recent):  Temp: 98.1 °F (36.7 °C) (09/15/17 0832)  Pulse: 84 (09/15/17 1100)  Resp: 18 (09/15/17 0832)  BP: (!) 78/0 (09/15/17 1237)  SpO2: 96 % (09/15/17 0832) Vital Signs (24h Range):  Temp:  [98.1 °F (36.7 °C)-98.6 °F (37 °C)] 98.1 °F (36.7 °C)  Pulse:  [79-93] 84  Resp:  [18] 18  SpO2:  [95 %-96 %] 96 %  BP: ()/(0-72) 78/0     Weight: 78.8 kg (173 lb 11.6 oz)  Body mass index is 26.41 kg/m².      Intake/Output Summary (Last 24 hours) at 09/15/17 1501  Last data filed at 09/15/17 1400   Gross per 24 hour   Intake              222 ml   Output             1150 ml   Net             -928 ml     Physical Exam   Constitutional: He is oriented to person, place, and time. He appears well-developed and well-nourished.   HENT:   Head: Normocephalic and atraumatic.   Eyes: EOM are normal. Pupils are equal, round, and reactive to light.   Neck: Normal range of motion. Neck supple. JVD present.   Cardiovascular: Normal rate and regular rhythm.    No murmur heard.  VAD hum  smooth   Pulmonary/Chest: Effort normal and breath sounds normal.   Abdominal: Soft. Bowel sounds are normal. He exhibits no distension. There is no tenderness. There is no guarding.   Musculoskeletal: Normal range of motion. He exhibits no edema.   Neurological: He is alert and oriented to person, place, and time.   Skin: Skin is warm and dry.   Nursing note and vitals reviewed.      Significant Labs:  CBC:    Recent Labs  Lab 09/15/17  0524   WBC 5.99   RBC 2.78*   HGB 7.7*   HCT 24.1*      MCV 87   MCH 27.7   MCHC 32.0     BNP:    Recent Labs  Lab 09/15/17  0524   BNP 1,756*     CMP:    Recent Labs  Lab 09/15/17  0524   GLU 61*   CALCIUM 7.8*   ALBUMIN 2.2*   PROT 6.3      K 3.3*   CO2 27      BUN 23   CREATININE 1.3   ALKPHOS 90   ALT 12   AST 18   BILITOT 1.2*      Coagulation:     Recent Labs  Lab 09/15/17  0524   INR 3.4*     LDH:    Recent Labs  Lab 09/13/17  0407 09/14/17  0534 09/15/17  0524   * 291* 261*     Microbiology:  Microbiology Results (last 7 days)     ** No results found for the last 168 hours. **          I have reviewed all pertinent labs within the past 24 hours.    Estimated Creatinine Clearance: 53.3 mL/min (based on SCr of 1.3 mg/dL).    Diagnostic Results:  I have reviewed and interpreted all pertinent imaging results/findings within the past 24 hours.

## 2017-09-15 NOTE — PLAN OF CARE
Problem: Patient Care Overview  Goal: Plan of Care Review  Outcome: Ongoing (interventions implemented as appropriate)  Address all questions and concerns throughout shift.  Plan of care discussed w/ patient and wife Patient ambulating with assitance, fall precautions in place.Continuing to encourage sternal precautions, IS, and ambulation. LVAD DP and numbers WNL, smooth LVAD hum.  Patient wife checked off on alarms 09/13/17. H&H stable today.  Pt continue on dobutamine at 2.5mcg... Pt did have a chest xray done today and pt went to the [employer] [place] today.  Patient has no questions at this time. Pt is pending d/c mid next week. Wife performed dressing change on pt

## 2017-09-15 NOTE — PT/OT/SLP PROGRESS
Occupational Therapy      Suman Hayden  MRN: [id number]    Patient not seen today secondary to Other (in AM attempt, pt declined due to just getting breakfast; in PM attempt, pt off the floor for x-ray; unable to return for 3rd attempt). Will follow-up over the weekend.    DELMY Mohamud  9/15/2017

## 2017-09-15 NOTE — PLAN OF CARE
Problem: Physical Therapy Goal  Goal: Physical Therapy Goal  Goals to be met by: 10/9/17     Patient will increase functional independence with mobility by performin. Supine to sit with MInimal Assistance-  Met 2017  2. Sit to supine with MInimal Assistance - not met  3. Sit to stand transfer with Minimal Assistance- met       Updated: Sit to stand transfer with supervision using RW   4. Bed to chair transfer with Minimal Assistance- not met  5. Gait  x 50 feet with Minimal Assistance. -  Met 2017  6. Lower extremity exercise program x15 reps, with supervision, in order to increase LE strength and (I) with functional mobility. - not met  7.Pt mod I supine to sit- not met  8. Pt receive gait training ~ 220 ft with AD if needed and supervision- not met            Outcome: Ongoing (interventions implemented as appropriate)  Pt progressing towards therapy goals; continue current POC.     Tonja Vallecillo, PT, DPT   9/15/2017  Pager: [phone]

## 2017-09-15 NOTE — NURSING
Resume care from previous nurse, pt voice no complaints, lying in bed w/ bed in lowest position and locked. Call bell within reach, introduce myself to pt. Dobutamine infusing at 2.5mcg 79.9 kg- 6ml/hr by pump. Input hct in monitor. Will continue to monitor pt status

## 2017-09-15 NOTE — PROGRESS NOTES
Plan of care discussed with patient.  Patient ambulating independently, fall precautions in place.Continuing to encourage sternal precautions, IS, and ambulation. LVAD DP and numbers WNL, smooth LVAD hum. Patient has no complaints of pain. Discussed medications and care. Wife checked off on alarms. 1 unit blood given per order; leukocyte filter implemented. lifevest on. Encouraged workbook, reviewed education, filled in binder. Patient has no questions at this time. Will continue to monitor.

## 2017-09-15 NOTE — PROCEDURES
Patient up in chair aaox3 with wife at bedside. VAD interrogation completed this AM in the event changes needed to be made. Will continue to monitor for further issues.     Pulsatile: Yes   VAD Sounds: HM3  Smooth  Problems / Issues / Alarms with VAD if any: None noted  HCT: 24.1   Modular Cable Connection Intact(no yellow exposed): YES loosens and tightens easily.     VAD Interrogation:  TXP LVAD INTERROGATIONS 9/15/2017 9/15/2017 9/15/2017 9/14/2017 9/14/2017 9/14/2017 9/14/2017   Type HeartMate3 HeartMate3 HeartMate3 HeartMate3 HeartMate3 HeartMate3 HeartMate3   Flow 4.2 4.3 4.4 4.3 4.2 4.4 -   Speed 5200 5200 5200 5200 5200 5250 -   PI 3.5 3.6 3.8 3.4 3.6 3.6 -   Power ([place]) 3.6 3.5 3.5 3.5 3.5 3.5 -   LSL 4800 - - 4800 - - -   Pulsatility No Pulse - - - Intermittent pulse Pulse -   Some recent data might be hidden     I met with pt and Mrs Hayden approx 15minutes. They are both in good spirits looking forward to being discharged next week. They report that his appetite continues to get better since taking marinol and that he walked approx 400 ft today. I encouraged him to keep eating well and how that is contributing to his increase stamina, muscle strength and healing. We also went over the name and function of his VAD components.  VAD education is on going.

## 2017-09-15 NOTE — PT/OT/SLP PROGRESS
Physical Therapy  Treatment    Suman Hayden   MRN: [id number]   Admitting Diagnosis: LVAD (left ventricular assist device) present    PT Received On: 09/15/17  PT Start Time: 1012     PT Stop Time: 1045    PT Total Time (min): 33 min       Billable Minutes:  Gait Training 33 min    Treatment Type: Treatment  PT/PTA: PT     PTA Visit Number: 0       General Precautions: Standard, fall, LVAD  Orthopedic Precautions: N/A   Braces: N/A    Do you have any cultural, spiritual, Tenriism conflicts, given your current situation?: none noted     Subjective:  Communicated with RN prior to session. Pt agreeable to participate in therapy session. Pt's wife at bedside, providing encouragement to patient throughout.     Pain/Comfort  Pain Rating 1: 0/10  Pain Rating Post-Intervention 1: 0/10    Objective:   Patient found with: telemetry, PICC line (LVAD to battery power)    Functional Mobility:  Bed Mobility:    N/T 2/2 pt sitting up in chair upon PT arrival     Transfers:  Sit <> Stand Assistance: Moderate Assistance, Minimum Assistance (mod A from bedside chair x 1st trial; min A from rollator seat x 2 trials )  Sit <> Stand Assistive Device: Rolling Walker, 4 wheeled walker  Toilet Transfer Assistance: Moderate Assistance  Toilet Transfer Assistive Device: Rolling Walker    Gait:   Gait Distance: 400 ft. with 2 seated rest breaks as needed. No LOB.   Assistance 1: Contact Guard Assistance, Minimum assistance  Gait Assistive Device: Rollator, Rolling walker  Gait Pattern: reciprocal  Gait Deviation(s): decreased hal, decreased stride length, decreased step length, decreased toe-to-floor clearance    Balance:   Static Sit: GOOD: Takes MODERATE challenges from all directions  Dynamic Sit: GOOD: Maintains balance through MODERATE excursions of active trunk movement  Static Stand: FAIR: Maintains without assist but unable to take challenges  Dynamic stand: FAIR -: Needs CONTACT GUARD to MINIMUM assistance during gait      Therapeutic Activities and Exercises:  Pt sitting up in chair upon PT arrival, requesting assistance to bathroom. Pt connected to LVAD battery power. Pt and wife educated extensively on safety with line management during mobility tasks due to fall risk. Therapist facilitated practice of sit<>stand from wheelchair and toilet to increase pt's (I) and safety with functional transfers.     Therapist facilitated trial of gait within room with RW and gait within hallway with rollator use. Pt and wife provided with verbal instruction and demonstration on safety with RW management, especially during tranfers and using rollator seat as needed. Therapist facilitated progression of gait training to improve gait stability, endurance, and independence with functional ambulation. Pt able to ambulate 400' with rollator and CGA-min A for safety and rollator management. Pt requested 2 short, seated rest breaks during gait trial.     AM-PAC 6 CLICK MOBILITY  How much help from another person does this patient currently need?   1 = Unable, Total/Dependent Assistance  2 = A lot, Maximum/Moderate Assistance  3 = A little, Minimum/Contact Guard/Supervision  4 = None, Modified [place]/Independent    Turning over in bed (including adjusting bedclothes, sheets and blankets)?: 4  Sitting down on and standing up from a chair with arms (e.g., wheelchair, bedside commode, etc.): 3  Moving from lying on back to sitting on the side of the bed?: 3  Moving to and from a bed to a chair (including a wheelchair)?: 3  Need to walk in hospital room?: 3  Climbing 3-5 steps with a railing?: 1  Total Score: 17    AM-PAC Raw Score CMS G-Code Modifier Level of Impairment Assistance   6 % Total / Unable   7 - 9 CM 80 - 100% Maximal Assist   10 - 14 CL 60 - 80% Moderate Assist   15 - 19 CK 40 - 60% Moderate Assist   20 - 22 CJ 20 - 40% Minimal Assist   23 CI 1-20% SBA / CGA   24 CH 0% Independent/ Mod I     Patient left up in chair with all  lines intact, call button in reach, RN notified and wife present.    Assessment:  Suman Hayden is a 67 y.o. male with a medical diagnosis of LVAD (left ventricular assist device) present. Pt progressing well with functional mobility and endurance. Trial of gait performed with rollator today to improve pt's community ambulation and safety. Pt able to ambulate ~400 ft. With rollator and CGA-min A for safety, 2 seated rest breaks required due to continued endurance deficits. Pt would benefit from continued PT intervention to address below listed deficits and maximize return to PLOF.       Rehab identified problem list/impairments: Rehab identified problem list/impairments: weakness, impaired endurance, impaired self care skills, impaired functional mobilty, gait instability, impaired balance, impaired cardiopulmonary response to activity, decreased safety awareness    Rehab potential is good.    Activity tolerance: Good    Discharge recommendations: Discharge Facility/Level Of Care Needs: rehabilitation facility     Barriers to discharge: Barriers to Discharge: None    Equipment recommendations: Equipment Needed After Discharge: bedside commode, rollator, wheelchair     GOALS:    Physical Therapy Goals        Problem: Physical Therapy Goal    Goal Priority Disciplines Outcome Goal Variances Interventions   Physical Therapy Goal     PT/OT, PT Ongoing (interventions implemented as appropriate)     Description:  Goals to be met by: 10/9/17     Patient will increase functional independence with mobility by performin. Supine to sit with MInimal Assistance-  Met 2017  2. Sit to supine with MInimal Assistance - not met  3. Sit to stand transfer with Minimal Assistance- met       Updated: Sit to stand transfer with supervision using RW   4. Bed to chair transfer with Minimal Assistance- not met  5. Gait  x 50 feet with Minimal Assistance. -  Met 2017  6. Lower extremity exercise program x15 reps, with  supervision, in order to increase LE strength and (I) with functional mobility. - not met  7.Pt mod I supine to sit- not met  8. Pt receive gait training ~ 220 ft with AD if needed and supervision- not met                             PLAN:    Patient to be seen 6 x/week  to address the above listed problems via gait training, therapeutic activities, therapeutic exercises, neuromuscular re-education  Plan of Care expires: 10/09/17  Plan of Care reviewed with: patient, spouse        Tonja Vallecillo, PT, DPT   9/15/2017  Pager: [phone]

## 2017-09-15 NOTE — PROGRESS NOTES
[facility]  Heart Transplant  Progress Note    Patient Name: Suman Hayden  MRN: [id number]  Admission Date: 7/18/2017  Hospital Length of Stay: 59 days  Attending Physician: Deejay Duff Jr.,*  Primary Care Provider: Joe Ernst MD  Principal Problem:LVAD (left ventricular assist device) present    Subjective:     Interval History: Patient reports he is feeling better everyday.  His appetite has improved and he feels like his balance is getting better.   Wife at bedside and is checked off on VAD alarms.     Continuous Infusions:   DOBUTamine 2.5 mcg/kg/min (09/15/17 1230)     Scheduled Meds:   amlodipine  10 mg Oral Daily    aspirin  81 mg Oral Daily    dronabinol  5 mg Oral BID    furosemide  40 mg Oral BID    hydrALAZINE  50 mg Oral Q8H    lidocaine  1 patch Transdermal Q24H    pantoprazole  40 mg Oral Daily    pravastatin  20 mg Oral QHS    sodium chloride 0.9%  10 mL Intravenous Q6H     PRN Meds:sodium chloride, albuterol sulfate, bisacodyl, ceFAZolin (ANCEF) IVPB, dextrose 50%, glucagon (human recombinant), hydrocodone-acetaminophen 5-325mg, insulin aspart, ondansetron, Flushing PICC Protocol **AND** sodium chloride 0.9% **AND** sodium chloride 0.9%    Review of patient's allergies indicates:  No Known Allergies  Objective:     Vital Signs (Most Recent):  Temp: 98.1 °F (36.7 °C) (09/15/17 0832)  Pulse: 84 (09/15/17 1100)  Resp: 18 (09/15/17 0832)  BP: (!) 78/0 (09/15/17 1237)  SpO2: 96 % (09/15/17 0832) Vital Signs (24h Range):  Temp:  [98.1 °F (36.7 °C)-98.6 °F (37 °C)] 98.1 °F (36.7 °C)  Pulse:  [79-93] 84  Resp:  [18] 18  SpO2:  [95 %-96 %] 96 %  BP: ()/(0-72) 78/0     Weight: 78.8 kg (173 lb 11.6 oz)  Body mass index is 26.41 kg/m².      Intake/Output Summary (Last 24 hours) at 09/15/17 1501  Last data filed at 09/15/17 1400   Gross per 24 hour   Intake              222 ml   Output             1150 ml   Net             -928 ml     Physical Exam   Constitutional:  He is oriented to person, place, and time. He appears well-developed and well-nourished.   HENT:   Head: Normocephalic and atraumatic.   Eyes: EOM are normal. Pupils are equal, round, and reactive to light.   Neck: Normal range of motion. Neck supple. JVD present.   Cardiovascular: Normal rate and regular rhythm.    No murmur heard.  VAD hum smooth   Pulmonary/Chest: Effort normal and breath sounds normal.   Abdominal: Soft. Bowel sounds are normal. He exhibits no distension. There is no tenderness. There is no guarding.   Musculoskeletal: Normal range of motion. He exhibits no edema.   Neurological: He is alert and oriented to person, place, and time.   Skin: Skin is warm and dry.   Nursing note and vitals reviewed.      Significant Labs:  CBC:    Recent Labs  Lab 09/15/17  0524   WBC 5.99   RBC 2.78*   HGB 7.7*   HCT 24.1*      MCV 87   MCH 27.7   MCHC 32.0     BNP:    Recent Labs  Lab 09/15/17  0524   BNP 1,756*     CMP:    Recent Labs  Lab 09/15/17  0524   GLU 61*   CALCIUM 7.8*   ALBUMIN 2.2*   PROT 6.3      K 3.3*   CO2 27      BUN 23   CREATININE 1.3   ALKPHOS 90   ALT 12   AST 18   BILITOT 1.2*      Coagulation:     Recent Labs  Lab 09/15/17  0524   INR 3.4*     LDH:    Recent Labs  Lab 09/13/17  0407 09/14/17  0534 09/15/17  0524   * 291* 261*     Microbiology:  Microbiology Results (last 7 days)     ** No results found for the last 168 hours. **          I have reviewed all pertinent labs within the past 24 hours.    Estimated Creatinine Clearance: 53.3 mL/min (based on SCr of 1.3 mg/dL).    Diagnostic Results:  I have reviewed and interpreted all pertinent imaging results/findings within the past 24 hours.    Assessment and Plan:     * LVAD (left ventricular assist device) present    -HeartMate 3 Implanted 7/27/2017 as DT   -s/p delayed chest closure (7/28/17) and extubated (7/29/17), reintubated 8/9/17 and extubated 8/13/17  -HTS primary, given one unit of blood yesterday and  weaned   -Implanted by Dr. Downing  -Continue Coumadin, Goal INR 2.0-3.0.   -Antiplatelets : n/a  -LDH is stable overall today. Will continue to monitor daily.  -Speed set at 5200 rpm  -Interrogation notable for no events  -Not listed for OHTx           Bacteremia    - ESBL bacteremia.Cont ertapenem. Per ID- continue ertapenem x 4-6 weeks from 8/11. Coarse completed   - blood cultures from 8/29 NGTD          Essential hypertension    -Patient is pulsatile  -MAP goal 60-80 mmHg  -Blood pressure controlled over the last 24 hours  -Antihypertensive medications include Amlodipine and Hydralazine  Will monitor trends              V-tach    - Will hold on resuming Amiodarone; Patient's telemetry is stable and INR is so sensitive.          CHF (NYHA class IV, ACC/AHA stage D)    -NICM  -Last 2D Echo 9/1/2017: LVEF 10-15%, LVEDD 6.3 cm  -Current diuretic regimen: po lasix BID  -2g Na dietary restriction, 1500 mL fluid restriction, strict I/Os          Hyperlipidemia    -continue pravastatin        Hepatitis B core antibody positive since 2012              Stage III pressure ulcer of sacral region    - wound care        Atrial fibrillation    -AC, will not resume Amiodarone at this time.  Telemetry has been stable and INR is sensitive         Hyperglycemia    -per primary team        Atrial tachycardia    - [id number] - 3  -Rec restarting Amio. AC per CTS        AICD discharge    - Appropriate in the setting of VT aggravated by underlying AT/AFL (earlier during the admission). Device reprogrammed to VVI 80 this admission  -EP planning revision after discharge, will need lifevest               MELISSA Jon  Heart Transplant spectralink: [id number]  [facility]

## 2017-09-15 NOTE — PROGRESS NOTES
UPDATE    SW to pt's room for update. Pt presents as aaox3 with calm and pleasant affect. Pt's wife at bedside. Pt and wife report coping well and feeling encouraged pt's progress this week. Pt and wife report no needs at this time. SW providing psychosocial and counseling support, education, resources, and d/c planning as needed. SW continuing to follow and remains available.

## 2017-09-16 LAB
ANION GAP SERPL CALC-SCNC: 9 MMOL/L
BASOPHILS # BLD AUTO: 0.03 K/UL
BASOPHILS NFR BLD: 0.5 %
BUN SERPL-MCNC: 19 MG/DL
CALCIUM SERPL-MCNC: 8 MG/DL
CHLORIDE SERPL-SCNC: 106 MMOL/L
CO2 SERPL-SCNC: 27 MMOL/L
CREAT SERPL-MCNC: 1.2 MG/DL
DIFFERENTIAL METHOD: ABNORMAL
EOSINOPHIL # BLD AUTO: 0.2 K/UL
EOSINOPHIL NFR BLD: 3.8 %
ERYTHROCYTE [DISTWIDTH] IN BLOOD BY AUTOMATED COUNT: 16.6 %
EST. GFR  (AFRICAN AMERICAN): >60 ML/MIN/1.73 M^2
EST. GFR  (NON AFRICAN AMERICAN): >60 ML/MIN/1.73 M^2
GLUCOSE SERPL-MCNC: 63 MG/DL
HCT VFR BLD AUTO: 24.3 %
HGB BLD-MCNC: 7.8 G/DL
INR PPP: 3.3
LDH SERPL L TO P-CCNC: 285 U/L
LYMPHOCYTES # BLD AUTO: 1.3 K/UL
LYMPHOCYTES NFR BLD: 19.8 %
MAGNESIUM SERPL-MCNC: 2 MG/DL
MCH RBC QN AUTO: 27.7 PG
MCHC RBC AUTO-ENTMCNC: 32.1 G/DL
MCV RBC AUTO: 86 FL
MONOCYTES # BLD AUTO: 0.7 K/UL
MONOCYTES NFR BLD: 11.6 %
NEUTROPHILS # BLD AUTO: 4 K/UL
NEUTROPHILS NFR BLD: 64 %
PHOSPHATE SERPL-MCNC: 3.3 MG/DL
PLATELET # BLD AUTO: 235 K/UL
PMV BLD AUTO: 10.3 FL
POTASSIUM SERPL-SCNC: 3.1 MMOL/L
PROTHROMBIN TIME: 33.6 SEC
RBC # BLD AUTO: 2.82 M/UL
SODIUM SERPL-SCNC: 142 MMOL/L
WBC # BLD AUTO: 6.3 K/UL

## 2017-09-16 PROCEDURE — 25000003 PHARM REV CODE 250: Performed by: STUDENT IN AN ORGANIZED HEALTH CARE EDUCATION/TRAINING PROGRAM

## 2017-09-16 PROCEDURE — 25000003 PHARM REV CODE 250: Performed by: NURSE PRACTITIONER

## 2017-09-16 PROCEDURE — 97535 SELF CARE MNGMENT TRAINING: CPT

## 2017-09-16 PROCEDURE — 20600001 HC STEP DOWN PRIVATE ROOM

## 2017-09-16 PROCEDURE — 83615 LACTATE (LD) (LDH) ENZYME: CPT

## 2017-09-16 PROCEDURE — A4216 STERILE WATER/SALINE, 10 ML: HCPCS | Performed by: THORACIC SURGERY (CARDIOTHORACIC VASCULAR SURGERY)

## 2017-09-16 PROCEDURE — 25000003 PHARM REV CODE 250: Performed by: THORACIC SURGERY (CARDIOTHORACIC VASCULAR SURGERY)

## 2017-09-16 PROCEDURE — 27000248 HC VAD-ADDITIONAL DAY

## 2017-09-16 PROCEDURE — 80048 BASIC METABOLIC PNL TOTAL CA: CPT

## 2017-09-16 PROCEDURE — 25000003 PHARM REV CODE 250: Performed by: INTERNAL MEDICINE

## 2017-09-16 PROCEDURE — 63600175 PHARM REV CODE 636 W HCPCS: Performed by: NURSE PRACTITIONER

## 2017-09-16 PROCEDURE — 83735 ASSAY OF MAGNESIUM: CPT

## 2017-09-16 PROCEDURE — 97530 THERAPEUTIC ACTIVITIES: CPT

## 2017-09-16 PROCEDURE — 84100 ASSAY OF PHOSPHORUS: CPT

## 2017-09-16 PROCEDURE — 25000003 PHARM REV CODE 250: Performed by: PHYSICIAN ASSISTANT

## 2017-09-16 PROCEDURE — 85025 COMPLETE CBC W/AUTO DIFF WBC: CPT

## 2017-09-16 PROCEDURE — 99232 SBSQ HOSP IP/OBS MODERATE 35: CPT | Mod: ,,, | Performed by: INTERNAL MEDICINE

## 2017-09-16 PROCEDURE — 85610 PROTHROMBIN TIME: CPT

## 2017-09-16 RX ORDER — POTASSIUM CHLORIDE 20 MEQ/1
60 TABLET, EXTENDED RELEASE ORAL DAILY
Status: DISCONTINUED | OUTPATIENT
Start: 2017-09-16 | End: 2017-09-16

## 2017-09-16 RX ORDER — POTASSIUM CHLORIDE 20 MEQ/1
60 TABLET, EXTENDED RELEASE ORAL ONCE
Status: COMPLETED | OUTPATIENT
Start: 2017-09-16 | End: 2017-09-16

## 2017-09-16 RX ADMIN — AMLODIPINE BESYLATE 10 MG: 10 TABLET ORAL at 08:09

## 2017-09-16 RX ADMIN — DRONABINOL 5 MG: 2.5 CAPSULE ORAL at 07:09

## 2017-09-16 RX ADMIN — PANTOPRAZOLE SODIUM 40 MG: 40 TABLET, DELAYED RELEASE ORAL at 08:09

## 2017-09-16 RX ADMIN — FUROSEMIDE 40 MG: 40 TABLET ORAL at 08:09

## 2017-09-16 RX ADMIN — HYDRALAZINE HYDROCHLORIDE 50 MG: 50 TABLET ORAL at 05:09

## 2017-09-16 RX ADMIN — Medication 10 ML: at 05:09

## 2017-09-16 RX ADMIN — HYDRALAZINE HYDROCHLORIDE 50 MG: 50 TABLET ORAL at 07:09

## 2017-09-16 RX ADMIN — POTASSIUM CHLORIDE 60 MEQ: 1500 TABLET, EXTENDED RELEASE ORAL at 03:09

## 2017-09-16 RX ADMIN — PRAVASTATIN SODIUM 20 MG: 20 TABLET ORAL at 07:09

## 2017-09-16 RX ADMIN — ASPIRIN 81 MG: 81 TABLET, COATED ORAL at 08:09

## 2017-09-16 RX ADMIN — HYDRALAZINE HYDROCHLORIDE 50 MG: 50 TABLET ORAL at 02:09

## 2017-09-16 RX ADMIN — FUROSEMIDE 40 MG: 40 TABLET ORAL at 07:09

## 2017-09-16 RX ADMIN — DRONABINOL 5 MG: 2.5 CAPSULE ORAL at 08:09

## 2017-09-16 NOTE — ASSESSMENT & PLAN NOTE
-HeartMate 3 Implanted 7/27/2017 as DT   -s/p delayed chest closure (7/28/17) and extubated (7/29/17), reintubated 8/9/17 and extubated 8/13/17  -Implanted by Dr. Downing  -Continue Coumadin, Goal INR 2.0-3.0. On hold given INR supratherapeutic  -Antiplatelets : n/a  -LDH is stable overall today. Will continue to monitor daily.  -Speed set at 5200 rpm  -Interrogation notable for no events  -Not listed for OHTx

## 2017-09-16 NOTE — PT/OT/SLP PROGRESS
Occupational Therapy  Treatment    Suman Hayden   MRN: [id number]   Admitting Diagnosis: LVAD (left ventricular assist device) present    OT Date of Treatment: 09/16/17   OT Start Time: 0858  OT Stop Time: 0933  OT Total Time (min): 35 min    Billable Minutes:  Self Care/Home Management 20 and Therapeutic Activity 15    General Precautions: Standard, LVAD, fall  Orthopedic Precautions: N/A  Braces: N/A    Subjective:  Communicated with RN prior to session.    Pain/Comfort  Pain Rating 1: 0/10  Pain Rating Post-Intervention 1: 0/10    Objective:  Patient found with: telemetry, PICC line (LifeVest, LVAD to wall power)     Functional Mobility:  Bed Mobility:  Supine to Sit: Contact Guard Assistance with HOB elevated    Transfers:  Sit <> Stand Assistance: Contact Guard Assistance from EOB, Minimum Assistance from toilet  Sit <> Stand Assistive Device: Rolling Walker  Toilet Transfer Assistance: Minimum Assistance  Toilet Transfer Assistive Device: Rolling Walker    Functional Ambulation: To and from bathroom with CGA using RW; cues for safety, navigation of RW    Activities of Daily Living:  UE Dressing Level of Assistance: Maximum assistance from wife to don LVAD vest and place batteries in pockets  LE Dressing Level of Assistance: Maximum assistance from wife to don brief, pants, and tennis shoes  Toileting Where Assessed: Toilet  Toileting Level of Assistance: Contact guard for balance and Min A for clothing management    Balance:   Static Sit: GOOD+: Takes MAXIMAL challenges from all directions.    Dynamic Sit: GOOD+: Maintains balance through MAXIMAL excursions of active trunk motion  Static Stand: FAIR+: Takes MINIMAL challenges from all directions  Dynamic stand: FAIR: Needs CONTACT GUARD during gait    Therapeutic Activities and Exercises:  Pt supine in bed upon OT entry; CGA for bed mobility and sit to stand from EOB; performed functional mobility to bathroom for toileting, requiring cues for safe use of RW and  "line management; returned to W/C and switched LVAD to battery power with min cues and assistance from wife to don vest and LB clothing    AM-PAC 6 CLICK ADL   How much help from another person does this patient currently need?   1 = Unable, Total/Dependent Assistance  2 = A lot, Maximum/Moderate Assistance  3 = A little, Minimum/Contact Guard/Supervision  4 = None, Modified [place]/Independent    Putting on and taking off regular lower body clothing? : 2  Bathing (including washing, rinsing, drying)?: 2  Toileting, which includes using toilet, bedpan, or urinal? : 2  Putting on and taking off regular upper body clothing?: 3  Taking care of personal grooming such as brushing teeth?: 3  Eating meals?: 3  Total Score: 15     AM-PAC Raw Score CMS "G-Code Modifier Level of Impairment Assistance   6 % Total / Unable   7 - 8 CM 80 - 100% Maximal Assist   9-13 CL 60 - 80% Moderate Assist   14 - 19 CK 40 - 60% Moderate Assist   20 - 22 CJ 20 - 40% Minimal Assist   23 CI 1-20% SBA / CGA   24 CH 0% Independent/ Mod I       Patient left up in chair with all lines intact, call button in reach and wife present    ASSESSMENT:  Suman Hayden is a 67 y.o. male with a medical diagnosis of LVAD (left ventricular assist device) present and presents with deficits listed below. Pt making progress with functional mobility, but remains fall risk due to decreased safety awareness and standing balance. Pt would continue to benefit from OT to increase independence and safety. Recommend rehab upon D/C.    Rehab identified problem list/impairments: Rehab identified problem list/impairments: weakness, impaired endurance, impaired self care skills, impaired functional mobilty, decreased safety awareness, impaired cardiopulmonary response to activity    Rehab potential is good.    Activity tolerance: Good    Discharge recommendations: Discharge Facility/Level Of Care Needs: rehabilitation facility     Barriers to discharge: Barriers to " Discharge: None    Equipment recommendations: bedside commode, rollator, wheelchair     GOALS:    Occupational Therapy Goals        Problem: Occupational Therapy Goal    Goal Priority Disciplines Outcome Interventions   Occupational Therapy Goal     OT, PT/OT Ongoing (interventions implemented as appropriate)    Description:  Goals to be met by:  2 weeks 9/25/17    Patient will increase functional independence with ADLs by performing:  Feeding: Independent   UE Dressing with Supervision.  LE Dressing with Supervision.  Grooming while standing with Supervision.  Toileting from toilet with Supervision for hygiene and clothing management.   Stand pivot transfers with Supervision.  Toilet transfer to toilet with Supervision.  Pt will be supervision with LVAD yasmin't.     Goals to be met by:  2 weeks 9/12/17    Patient will increase functional independence with ADLs by performing:  Feeding: Independent   UE Dressing with Supervision.  LE Dressing with Supervision.  Grooming while standing with Supervision.  Toileting from toilet with Supervision for hygiene and clothing management.   Stand pivot transfers with Supervision.  Toilet transfer to toilet with Supervision.  Pt will be supervision  with LVAD yasmin't.     Goals to be met by:  2 weeks 8/28/17    Patient will increase functional independence with ADLs by performing:  Feeding: Independent   UE Dressing with Supervision.  LE Dressing with Supervision.  Grooming while standing with Supervision.  Toileting from toilet with Supervision for hygiene and clothing management.   Stand pivot transfers with Supervision.  Toilet transfer to toilet with Supervision.  Pt will be supervision  with LVAD yasmin't.                    Multidisciplinary Problems (Resolved)        Problem: Occupational Therapy Goal    Goal Priority Disciplines Outcome Interventions   Occupational Therapy Goal   (Resolved)     OT, PT/OT  Error    Description:  Goals to be met by:  8/04/2017    Patient will increase functional independence with ADLs by performing:    Increased functional strength to 5/5 for improved ADL performance.  Upper extremity exercise program and R LE ankle pumps  3x 10 reps per handout, with independence.                      Plan:  Patient to be seen 6 x/week to address the above listed problems via self-care/home management, therapeutic activities, therapeutic exercises  Plan of Care expires: 09/21/17  Plan of Care reviewed with: spouse, patient         DELMY Mohamud  09/16/2017

## 2017-09-16 NOTE — PROGRESS NOTES
[facility]  Heart Transplant  Progress Note    Patient Name: Suman Hayden  MRN: [id number]  Admission Date: 7/18/2017  Hospital Length of Stay: 60 days  Attending Physician: Deejay Duff Jr.,*  Primary Care Provider: Joe Ernst MD  Principal Problem:LVAD (left ventricular assist device) present    Subjective:     Interval History: No complaints. About to go out with physical therapy.    Continuous Infusions:   DOBUTamine 2.5 mcg/kg/min (09/15/17 1230)     Scheduled Meds:   amlodipine  10 mg Oral Daily    aspirin  81 mg Oral Daily    dronabinol  5 mg Oral BID    furosemide  40 mg Oral BID    hydrALAZINE  50 mg Oral Q8H    lidocaine  1 patch Transdermal Q24H    pantoprazole  40 mg Oral Daily    pravastatin  20 mg Oral QHS    sodium chloride 0.9%  10 mL Intravenous Q6H     PRN Meds:sodium chloride, albuterol sulfate, bisacodyl, ceFAZolin (ANCEF) IVPB, dextrose 50%, glucagon (human recombinant), hydrocodone-acetaminophen 5-325mg, insulin aspart, ondansetron, Flushing PICC Protocol **AND** sodium chloride 0.9% **AND** sodium chloride 0.9%    Review of patient's allergies indicates:  No Known Allergies  Objective:     Vital Signs (Most Recent):  Temp: 97.8 °F (36.6 °C) (09/16/17 0843)  Pulse: 84 (09/16/17 1100)  Resp: 18 (09/16/17 0843)  BP: (!) 80/0 (09/16/17 1210)  SpO2: 98 % (09/16/17 0843) Vital Signs (24h Range):  Temp:  [97.8 °F (36.6 °C)-99 °F (37.2 °C)] 97.8 °F (36.6 °C)  Pulse:  [72-84] 84  Resp:  [18] 18  SpO2:  [95 %-99 %] 98 %  BP: (76-88)/(0) 80/0     Weight: 73.4 kg (161 lb 13.1 oz)  Body mass index is 24.6 kg/m².      Intake/Output Summary (Last 24 hours) at 09/16/17 1222  Last data filed at 09/16/17 0400   Gross per 24 hour   Intake             1896 ml   Output              700 ml   Net             1196 ml     Physical Exam   Constitutional: He is oriented to person, place, and time. He appears well-developed and well-nourished.   HENT:   Head: Normocephalic and  atraumatic.   Eyes: EOM are normal. Pupils are equal, round, and reactive to light.   Neck: Normal range of motion. Neck supple. JVD present.   Cardiovascular: Normal rate and regular rhythm.    No murmur heard.  VAD hum smooth   Pulmonary/Chest: Effort normal and breath sounds normal.   Abdominal: Soft. Bowel sounds are normal. He exhibits no distension. There is no tenderness. There is no guarding.   Musculoskeletal: Normal range of motion. He exhibits no edema.   Neurological: He is alert and oriented to person, place, and time.   Skin: Skin is warm and dry.   Nursing note and vitals reviewed.      Significant Labs:  CBC:    Recent Labs  Lab 09/16/17  0500   WBC 6.30   RBC 2.82*   HGB 7.8*   HCT 24.3*      MCV 86   MCH 27.7   MCHC 32.1     BNP:    Recent Labs  Lab 09/15/17  0524   BNP 1,756*     CMP:    Recent Labs  Lab 09/15/17  0524 09/16/17  0500   GLU 61* 63*   CALCIUM 7.8* 8.0*   ALBUMIN 2.2*  --    PROT 6.3  --     142   K 3.3* 3.1*   CO2 27 27    106   BUN 23 19   CREATININE 1.3 1.2   ALKPHOS 90  --    ALT 12  --    AST 18  --    BILITOT 1.2*  --       Coagulation:     Recent Labs  Lab 09/16/17  0500   INR 3.3*     LDH:    Recent Labs  Lab 09/14/17  0534 09/15/17  0524 09/16/17  0500   * 261* 285*     Microbiology:  Microbiology Results (last 7 days)     ** No results found for the last 168 hours. **          I have reviewed all pertinent labs within the past 24 hours.    Estimated Creatinine Clearance: 57.8 mL/min (based on SCr of 1.2 mg/dL).    Diagnostic Results:  I have reviewed and interpreted all pertinent imaging results/findings within the past 24 hours.    Assessment and Plan:     * LVAD (left ventricular assist device) present    -HeartMate 3 Implanted 7/27/2017 as DT   -s/p delayed chest closure (7/28/17) and extubated (7/29/17), reintubated 8/9/17 and extubated 8/13/17  -Implanted by Dr. Downing  -Continue Coumadin, Goal INR 2.0-3.0. On hold given INR  supratherapeutic  -Antiplatelets : n/a  -LDH is stable overall today. Will continue to monitor daily.  -Speed set at 5200 rpm  -Interrogation notable for no events  -Not listed for OHTx           Stage III pressure ulcer of sacral region    - wound care        Bacteremia    - ESBL bacteremia.Cont ertapenem. Per ID- continue ertapenem x 4-6 weeks from 8/11. Coarse completed   - blood cultures from 8/29 NGTD          Atrial fibrillation    -AC, will not resume Amiodarone at this time.  Telemetry has been stable and INR is sensitive         Hyperglycemia    -per primary team        Atrial tachycardia    - [id number] - 3  -Rec restarting Amio. AC per CTS        AICD discharge    - Appropriate in the setting of VT aggravated by underlying AT/AFL (earlier during the admission). Device reprogrammed to VVI 80 this admission  -EP planning revision after discharge, will need lifevest           Hepatitis B core antibody positive since 2012              V-tach    - Will hold on resuming Amiodarone; Patient's telemetry is stable and INR is so sensitive.          Hyperlipidemia    -continue pravastatin        Essential hypertension    -Patient is pulsatile  -MAP goal 60-80 mmHg  -Blood pressure controlled over the last 24 hours  -Antihypertensive medications include Amlodipine and Hydralazine  Will monitor trends              CHF (NYHA class IV, ACC/AHA stage D)    -NICM  -Last 2D Echo 9/1/2017: LVEF 10-15%, LVEDD 6.3 cm  -Current diuretic regimen: po lasix BID  -2g Na dietary restriction, 1500 mL fluid restriction, strict I/Os              Maurice Ramos MD  Heart Transplant  [facility]

## 2017-09-16 NOTE — SUBJECTIVE & OBJECTIVE
Interval History: No complaints. About to go out with physical therapy.    Continuous Infusions:   DOBUTamine 2.5 mcg/kg/min (09/15/17 1230)     Scheduled Meds:   amlodipine  10 mg Oral Daily    aspirin  81 mg Oral Daily    dronabinol  5 mg Oral BID    furosemide  40 mg Oral BID    hydrALAZINE  50 mg Oral Q8H    lidocaine  1 patch Transdermal Q24H    pantoprazole  40 mg Oral Daily    pravastatin  20 mg Oral QHS    sodium chloride 0.9%  10 mL Intravenous Q6H     PRN Meds:sodium chloride, albuterol sulfate, bisacodyl, ceFAZolin (ANCEF) IVPB, dextrose 50%, glucagon (human recombinant), hydrocodone-acetaminophen 5-325mg, insulin aspart, ondansetron, Flushing PICC Protocol **AND** sodium chloride 0.9% **AND** sodium chloride 0.9%    Review of patient's allergies indicates:  No Known Allergies  Objective:     Vital Signs (Most Recent):  Temp: 97.8 °F (36.6 °C) (09/16/17 0843)  Pulse: 84 (09/16/17 1100)  Resp: 18 (09/16/17 0843)  BP: (!) 80/0 (09/16/17 1210)  SpO2: 98 % (09/16/17 0843) Vital Signs (24h Range):  Temp:  [97.8 °F (36.6 °C)-99 °F (37.2 °C)] 97.8 °F (36.6 °C)  Pulse:  [72-84] 84  Resp:  [18] 18  SpO2:  [95 %-99 %] 98 %  BP: (76-88)/(0) 80/0     Weight: 73.4 kg (161 lb 13.1 oz)  Body mass index is 24.6 kg/m².      Intake/Output Summary (Last 24 hours) at 09/16/17 1222  Last data filed at 09/16/17 0400   Gross per 24 hour   Intake             1896 ml   Output              700 ml   Net             1196 ml     Physical Exam   Constitutional: He is oriented to person, place, and time. He appears well-developed and well-nourished.   HENT:   Head: Normocephalic and atraumatic.   Eyes: EOM are normal. Pupils are equal, round, and reactive to light.   Neck: Normal range of motion. Neck supple. JVD present.   Cardiovascular: Normal rate and regular rhythm.    No murmur heard.  VAD hum smooth   Pulmonary/Chest: Effort normal and breath sounds normal.   Abdominal: Soft. Bowel sounds are normal. He exhibits no  distension. There is no tenderness. There is no guarding.   Musculoskeletal: Normal range of motion. He exhibits no edema.   Neurological: He is alert and oriented to person, place, and time.   Skin: Skin is warm and dry.   Nursing note and vitals reviewed.      Significant Labs:  CBC:    Recent Labs  Lab 09/16/17  0500   WBC 6.30   RBC 2.82*   HGB 7.8*   HCT 24.3*      MCV 86   MCH 27.7   MCHC 32.1     BNP:    Recent Labs  Lab 09/15/17  0524   BNP 1,756*     CMP:    Recent Labs  Lab 09/15/17  0524 09/16/17  0500   GLU 61* 63*   CALCIUM 7.8* 8.0*   ALBUMIN 2.2*  --    PROT 6.3  --     142   K 3.3* 3.1*   CO2 27 27    106   BUN 23 19   CREATININE 1.3 1.2   ALKPHOS 90  --    ALT 12  --    AST 18  --    BILITOT 1.2*  --       Coagulation:     Recent Labs  Lab 09/16/17  0500   INR 3.3*     LDH:    Recent Labs  Lab 09/14/17  0534 09/15/17  0524 09/16/17  0500   * 261* 285*     Microbiology:  Microbiology Results (last 7 days)     ** No results found for the last 168 hours. **          I have reviewed all pertinent labs within the past 24 hours.    Estimated Creatinine Clearance: 57.8 mL/min (based on SCr of 1.2 mg/dL).    Diagnostic Results:  I have reviewed and interpreted all pertinent imaging results/findings within the past 24 hours.

## 2017-09-17 LAB
ANION GAP SERPL CALC-SCNC: 7 MMOL/L
BASOPHILS # BLD AUTO: 0.03 K/UL
BASOPHILS NFR BLD: 0.4 %
BUN SERPL-MCNC: 18 MG/DL
CALCIUM SERPL-MCNC: 8 MG/DL
CHLORIDE SERPL-SCNC: 105 MMOL/L
CO2 SERPL-SCNC: 28 MMOL/L
CREAT SERPL-MCNC: 1.1 MG/DL
DIFFERENTIAL METHOD: ABNORMAL
EOSINOPHIL # BLD AUTO: 0.2 K/UL
EOSINOPHIL NFR BLD: 2.9 %
ERYTHROCYTE [DISTWIDTH] IN BLOOD BY AUTOMATED COUNT: 16.9 %
EST. GFR  (AFRICAN AMERICAN): >60 ML/MIN/1.73 M^2
EST. GFR  (NON AFRICAN AMERICAN): >60 ML/MIN/1.73 M^2
GLUCOSE SERPL-MCNC: 74 MG/DL
HCT VFR BLD AUTO: 25.2 %
HGB BLD-MCNC: 8 G/DL
INR PPP: 3.4
LDH SERPL L TO P-CCNC: 261 U/L
LYMPHOCYTES # BLD AUTO: 1.4 K/UL
LYMPHOCYTES NFR BLD: 19.5 %
MAGNESIUM SERPL-MCNC: 1.9 MG/DL
MCH RBC QN AUTO: 27.6 PG
MCHC RBC AUTO-ENTMCNC: 31.7 G/DL
MCV RBC AUTO: 87 FL
MONOCYTES # BLD AUTO: 0.8 K/UL
MONOCYTES NFR BLD: 10.7 %
NEUTROPHILS # BLD AUTO: 4.6 K/UL
NEUTROPHILS NFR BLD: 66.4 %
PHOSPHATE SERPL-MCNC: 3.3 MG/DL
PLATELET # BLD AUTO: 249 K/UL
PMV BLD AUTO: 10.4 FL
POTASSIUM SERPL-SCNC: 3.4 MMOL/L
PROTHROMBIN TIME: 34.1 SEC
RBC # BLD AUTO: 2.9 M/UL
SODIUM SERPL-SCNC: 140 MMOL/L
WBC # BLD AUTO: 6.98 K/UL

## 2017-09-17 PROCEDURE — 84100 ASSAY OF PHOSPHORUS: CPT

## 2017-09-17 PROCEDURE — A4216 STERILE WATER/SALINE, 10 ML: HCPCS | Performed by: THORACIC SURGERY (CARDIOTHORACIC VASCULAR SURGERY)

## 2017-09-17 PROCEDURE — 27000248 HC VAD-ADDITIONAL DAY

## 2017-09-17 PROCEDURE — 20600001 HC STEP DOWN PRIVATE ROOM

## 2017-09-17 PROCEDURE — 25000003 PHARM REV CODE 250: Performed by: STUDENT IN AN ORGANIZED HEALTH CARE EDUCATION/TRAINING PROGRAM

## 2017-09-17 PROCEDURE — 80048 BASIC METABOLIC PNL TOTAL CA: CPT

## 2017-09-17 PROCEDURE — 99232 SBSQ HOSP IP/OBS MODERATE 35: CPT | Mod: ,,, | Performed by: INTERNAL MEDICINE

## 2017-09-17 PROCEDURE — 83735 ASSAY OF MAGNESIUM: CPT

## 2017-09-17 PROCEDURE — 85025 COMPLETE CBC W/AUTO DIFF WBC: CPT

## 2017-09-17 PROCEDURE — 25000003 PHARM REV CODE 250: Performed by: THORACIC SURGERY (CARDIOTHORACIC VASCULAR SURGERY)

## 2017-09-17 PROCEDURE — 25000003 PHARM REV CODE 250: Performed by: PHYSICIAN ASSISTANT

## 2017-09-17 PROCEDURE — 63600175 PHARM REV CODE 636 W HCPCS: Performed by: NURSE PRACTITIONER

## 2017-09-17 PROCEDURE — 85610 PROTHROMBIN TIME: CPT

## 2017-09-17 PROCEDURE — 97116 GAIT TRAINING THERAPY: CPT

## 2017-09-17 PROCEDURE — 83615 LACTATE (LD) (LDH) ENZYME: CPT

## 2017-09-17 PROCEDURE — 63600175 PHARM REV CODE 636 W HCPCS: Performed by: PHYSICIAN ASSISTANT

## 2017-09-17 PROCEDURE — 25000003 PHARM REV CODE 250: Performed by: INTERNAL MEDICINE

## 2017-09-17 PROCEDURE — 25000003 PHARM REV CODE 250: Performed by: NURSE PRACTITIONER

## 2017-09-17 RX ORDER — FUROSEMIDE 40 MG/1
40 TABLET ORAL ONCE
Status: COMPLETED | OUTPATIENT
Start: 2017-09-17 | End: 2017-09-17

## 2017-09-17 RX ORDER — POTASSIUM CHLORIDE 20 MEQ/1
40 TABLET, EXTENDED RELEASE ORAL ONCE
Status: COMPLETED | OUTPATIENT
Start: 2017-09-17 | End: 2017-09-17

## 2017-09-17 RX ADMIN — DOBUTAMINE IN DEXTROSE 2.5 MCG/KG/MIN: 200 INJECTION, SOLUTION INTRAVENOUS at 12:09

## 2017-09-17 RX ADMIN — POTASSIUM CHLORIDE 40 MEQ: 1500 TABLET, EXTENDED RELEASE ORAL at 10:09

## 2017-09-17 RX ADMIN — AMLODIPINE BESYLATE 10 MG: 10 TABLET ORAL at 08:09

## 2017-09-17 RX ADMIN — FUROSEMIDE 40 MG: 40 TABLET ORAL at 08:09

## 2017-09-17 RX ADMIN — HYDRALAZINE HYDROCHLORIDE 50 MG: 50 TABLET ORAL at 08:09

## 2017-09-17 RX ADMIN — ASPIRIN 81 MG: 81 TABLET, COATED ORAL at 08:09

## 2017-09-17 RX ADMIN — HYDRALAZINE HYDROCHLORIDE 50 MG: 50 TABLET ORAL at 06:09

## 2017-09-17 RX ADMIN — FUROSEMIDE 40 MG: 40 TABLET ORAL at 02:09

## 2017-09-17 RX ADMIN — Medication 10 ML: at 06:09

## 2017-09-17 RX ADMIN — HYDRALAZINE HYDROCHLORIDE 50 MG: 50 TABLET ORAL at 02:09

## 2017-09-17 RX ADMIN — Medication 10 ML: at 12:09

## 2017-09-17 RX ADMIN — DRONABINOL 5 MG: 2.5 CAPSULE ORAL at 08:09

## 2017-09-17 RX ADMIN — PANTOPRAZOLE SODIUM 40 MG: 40 TABLET, DELAYED RELEASE ORAL at 08:09

## 2017-09-17 RX ADMIN — PRAVASTATIN SODIUM 20 MG: 20 TABLET ORAL at 08:09

## 2017-09-17 NOTE — ASSESSMENT & PLAN NOTE
-NICM  -Last 2D Echo 9/1/2017: LVEF 10-15%, LVEDD 6.3 cm  -Current diuretic regimen: po lasix BID, extra dose given today and INR may reflect some congestion along with weight gain  -2g Na dietary restriction, 1500 mL fluid restriction, strict I/Os

## 2017-09-17 NOTE — PROGRESS NOTES
[facility]  Heart Transplant  Progress Note    Patient Name: Suman Hayden  MRN: [id number]  Admission Date: 7/18/2017  Hospital Length of Stay: 61 days  Attending Physician: Deejay Duff Jr.,*  Primary Care Provider: Joe Ernst MD  Principal Problem:LVAD (left ventricular assist device) present    Subjective:     Interval History: No complaints. PICC moved out a little but still under sterile dressing and functioning properly. He walked using wheelchair for support this morning.    Continuous Infusions:   DOBUTamine 2.5 mcg/kg/min (09/15/17 1230)     Scheduled Meds:   amlodipine  10 mg Oral Daily    aspirin  81 mg Oral Daily    dronabinol  5 mg Oral BID    furosemide  40 mg Oral BID    furosemide  40 mg Oral Once    hydrALAZINE  50 mg Oral Q8H    lidocaine  1 patch Transdermal Q24H    pantoprazole  40 mg Oral Daily    pravastatin  20 mg Oral QHS    sodium chloride 0.9%  10 mL Intravenous Q6H     PRN Meds:sodium chloride, albuterol sulfate, bisacodyl, ceFAZolin (ANCEF) IVPB, dextrose 50%, glucagon (human recombinant), hydrocodone-acetaminophen 5-325mg, insulin aspart, ondansetron, Flushing PICC Protocol **AND** sodium chloride 0.9% **AND** sodium chloride 0.9%    Review of patient's allergies indicates:  No Known Allergies  Objective:     Vital Signs (Most Recent):  Temp: 98.3 °F (36.8 °C) (09/17/17 1200)  Pulse: 67 (09/17/17 1200)  Resp: 18 (09/17/17 1200)  BP: 93/61 (09/17/17 1200)  SpO2: 99 % (09/17/17 1200) Vital Signs (24h Range):  Temp:  [96.6 °F (35.9 °C)-98.8 °F (37.1 °C)] 98.3 °F (36.8 °C)  Pulse:  [67-91] 67  Resp:  [16-18] 18  SpO2:  [93 %-99 %] 99 %  BP: ()/(0-72) 93/61     Weight: 76.2 kg (167 lb 15.9 oz)  Body mass index is 25.54 kg/m².      Intake/Output Summary (Last 24 hours) at 09/17/17 1243  Last data filed at 09/17/17 0500   Gross per 24 hour   Intake              729 ml   Output              475 ml   Net              254 ml     Physical Exam    Constitutional: He is oriented to person, place, and time. He appears well-developed and well-nourished.   HENT:   Head: Normocephalic and atraumatic.   Eyes: EOM are normal. Pupils are equal, round, and reactive to light.   Neck: Normal range of motion. Neck supple. JVD present.   Cardiovascular: Normal rate and regular rhythm.    No murmur heard.  VAD hum smooth   Pulmonary/Chest: Effort normal and breath sounds normal.   Abdominal: Soft. Bowel sounds are normal. He exhibits no distension. There is no tenderness. There is no guarding.   Musculoskeletal: Normal range of motion. He exhibits no edema.   Neurological: He is alert and oriented to person, place, and time.   Skin: Skin is warm and dry.   Nursing note and vitals reviewed.      Significant Labs:  CBC:    Recent Labs  Lab 09/17/17  0600   WBC 6.98   RBC 2.90*   HGB 8.0*   HCT 25.2*      MCV 87   MCH 27.6   MCHC 31.7*     BNP:    Recent Labs  Lab 09/15/17  0524   BNP 1,756*     CMP:    Recent Labs  Lab 09/15/17  0524  09/17/17  0600   GLU 61*  < > 74   CALCIUM 7.8*  < > 8.0*   ALBUMIN 2.2*  --   --    PROT 6.3  --   --      < > 140   K 3.3*  < > 3.4*   CO2 27  < > 28     < > 105   BUN 23  < > 18   CREATININE 1.3  < > 1.1   ALKPHOS 90  --   --    ALT 12  --   --    AST 18  --   --    BILITOT 1.2*  --   --    < > = values in this interval not displayed.   Coagulation:     Recent Labs  Lab 09/17/17  0600   INR 3.4*     LDH:    Recent Labs  Lab 09/15/17  0524 09/16/17  0500 09/17/17  0600   * 285* 261*     Microbiology:  Microbiology Results (last 7 days)     ** No results found for the last 168 hours. **          I have reviewed all pertinent labs within the past 24 hours.    Estimated Creatinine Clearance: 63 mL/min (based on SCr of 1.1 mg/dL).    Diagnostic Results:  I have reviewed and interpreted all pertinent imaging results/findings within the past 24 hours.    Assessment and Plan:     * LVAD (left ventricular assist device)  present    -HeartMate 3 Implanted 7/27/2017 as DT   -s/p delayed chest closure (7/28/17) and extubated (7/29/17), reintubated 8/9/17 and extubated 8/13/17  -Implanted by Dr. Downing  -Continue Coumadin, Goal INR 2.0-3.0. On hold given INR supratherapeutic  -Antiplatelets : n/a  -LDH is stable overall today. Will continue to monitor daily.  -Speed set at 5200 rpm  -Interrogation notable for no events  -Not listed for OHTx           Stage III pressure ulcer of sacral region    - wound care        Bacteremia    - ESBL bacteremia.Cont ertapenem. Per ID- continue ertapenem x 4-6 weeks from 8/11. Coarse completed   - blood cultures from 8/29 NGTD          Atrial fibrillation    -AC, will not resume Amiodarone at this time.  Telemetry has been stable and INR is sensitive         Hyperglycemia    -per primary team        Atrial tachycardia    - [id number] - 3  -Rec restarting Amio. AC per CTS        AICD discharge    - Appropriate in the setting of VT aggravated by underlying AT/AFL (earlier during the admission). Device reprogrammed to VVI 80 this admission  -EP planning revision after discharge, will need lifevest           Hepatitis B core antibody positive since 2012              V-tach    - Will hold on resuming Amiodarone; Patient's telemetry is stable and INR is so sensitive.          Hyperlipidemia    -continue pravastatin        Essential hypertension    -Patient is pulsatile  -MAP goal 60-80 mmHg  -Blood pressure controlled over the last 24 hours  -Antihypertensive medications include Amlodipine and Hydralazine  Will monitor trends              CHF (NYHA class IV, ACC/AHA stage D)    -NIC  -Last 2D Echo 9/1/2017: LVEF 10-15%, LVEDD 6.3 cm  -Current diuretic regimen: po lasix BID, extra dose given today and INR may reflect some congestion along with weight gain  -2g Na dietary restriction, 1500 mL fluid restriction, strict I/Os              Maurice Ramos MD  Heart Transplant  [facility]

## 2017-09-17 NOTE — SUBJECTIVE & OBJECTIVE
Interval History: No complaints. PICC moved out a little but still under sterile dressing and functioning properly. He walked using wheelchair for support this morning.    Continuous Infusions:   DOBUTamine 2.5 mcg/kg/min (09/15/17 1230)     Scheduled Meds:   amlodipine  10 mg Oral Daily    aspirin  81 mg Oral Daily    dronabinol  5 mg Oral BID    furosemide  40 mg Oral BID    furosemide  40 mg Oral Once    hydrALAZINE  50 mg Oral Q8H    lidocaine  1 patch Transdermal Q24H    pantoprazole  40 mg Oral Daily    pravastatin  20 mg Oral QHS    sodium chloride 0.9%  10 mL Intravenous Q6H     PRN Meds:sodium chloride, albuterol sulfate, bisacodyl, ceFAZolin (ANCEF) IVPB, dextrose 50%, glucagon (human recombinant), hydrocodone-acetaminophen 5-325mg, insulin aspart, ondansetron, Flushing PICC Protocol **AND** sodium chloride 0.9% **AND** sodium chloride 0.9%    Review of patient's allergies indicates:  No Known Allergies  Objective:     Vital Signs (Most Recent):  Temp: 98.3 °F (36.8 °C) (09/17/17 1200)  Pulse: 67 (09/17/17 1200)  Resp: 18 (09/17/17 1200)  BP: 93/61 (09/17/17 1200)  SpO2: 99 % (09/17/17 1200) Vital Signs (24h Range):  Temp:  [96.6 °F (35.9 °C)-98.8 °F (37.1 °C)] 98.3 °F (36.8 °C)  Pulse:  [67-91] 67  Resp:  [16-18] 18  SpO2:  [93 %-99 %] 99 %  BP: ()/(0-72) 93/61     Weight: 76.2 kg (167 lb 15.9 oz)  Body mass index is 25.54 kg/m².      Intake/Output Summary (Last 24 hours) at 09/17/17 1243  Last data filed at 09/17/17 0500   Gross per 24 hour   Intake              729 ml   Output              475 ml   Net              254 ml     Physical Exam   Constitutional: He is oriented to person, place, and time. He appears well-developed and well-nourished.   HENT:   Head: Normocephalic and atraumatic.   Eyes: EOM are normal. Pupils are equal, round, and reactive to light.   Neck: Normal range of motion. Neck supple. JVD present.   Cardiovascular: Normal rate and regular rhythm.    No murmur  heard.  VAD hum smooth   Pulmonary/Chest: Effort normal and breath sounds normal.   Abdominal: Soft. Bowel sounds are normal. He exhibits no distension. There is no tenderness. There is no guarding.   Musculoskeletal: Normal range of motion. He exhibits no edema.   Neurological: He is alert and oriented to person, place, and time.   Skin: Skin is warm and dry.   Nursing note and vitals reviewed.      Significant Labs:  CBC:    Recent Labs  Lab 09/17/17  0600   WBC 6.98   RBC 2.90*   HGB 8.0*   HCT 25.2*      MCV 87   MCH 27.6   MCHC 31.7*     BNP:    Recent Labs  Lab 09/15/17  0524   BNP 1,756*     CMP:    Recent Labs  Lab 09/15/17  0524  09/17/17  0600   GLU 61*  < > 74   CALCIUM 7.8*  < > 8.0*   ALBUMIN 2.2*  --   --    PROT 6.3  --   --      < > 140   K 3.3*  < > 3.4*   CO2 27  < > 28     < > 105   BUN 23  < > 18   CREATININE 1.3  < > 1.1   ALKPHOS 90  --   --    ALT 12  --   --    AST 18  --   --    BILITOT 1.2*  --   --    < > = values in this interval not displayed.   Coagulation:     Recent Labs  Lab 09/17/17  0600   INR 3.4*     LDH:    Recent Labs  Lab 09/15/17  0524 09/16/17  0500 09/17/17  0600   * 285* 261*     Microbiology:  Microbiology Results (last 7 days)     ** No results found for the last 168 hours. **          I have reviewed all pertinent labs within the past 24 hours.    Estimated Creatinine Clearance: 63 mL/min (based on SCr of 1.1 mg/dL).    Diagnostic Results:  I have reviewed and interpreted all pertinent imaging results/findings within the past 24 hours.

## 2017-09-17 NOTE — PT/OT/SLP PROGRESS
Physical Therapy  Treatment    Suman Hayden   MRN: [id number]   Admitting Diagnosis: LVAD (left ventricular assist device) present    PT Received On: 09/17/17  PT Start Time: 1040     PT Stop Time: 1112    PT Total Time (min): 32 min       Billable Minutes:  Gait [id number] min    Treatment Type: Treatment  PT/PTA: PT     PTA Visit Number: 0       General Precautions: Standard, LVAD, fall  Orthopedic Precautions: N/A   Braces:      Do you have any cultural, spiritual, Taoism conflicts, given your current situation?: none noted     Subjective:  Communicated with nurse prior to session.      Pain/Comfort  Pain Rating 1: 0/10  Pain Rating Post-Intervention 1: 0/10    Objective:   Patient found with: PICC line, telemetry (LVAD, Life vest)    Functional Mobility:  Bed Mobility:   Rolling/Turning to Left:  (not tested. pt was sitting on EOB for treatment. )    Transfers:  Sit <> Stand Assistance: Minimum Assistance (pt needed verbal cues for hand placement and sequencing for functional mobility. )    Gait:   Gait Distance: 146 ft, 166 ft, 154 ft, 42 ft (508 ft total). pt had sitting rest periods with distance ambulated and emergency bag present.   Assistance 1: Total assistance (CGA for gait training and additional assist of 1 for chair.)  Gait Assistive Device: Rolling walker  Gait Pattern: 3-point gait    AM-PAC 6 CLICK MOBILITY  How much help from another person does this patient currently need?   1 = Unable, Total/Dependent Assistance  2 = A lot, Maximum/Moderate Assistance  3 = A little, Minimum/Contact Guard/Supervision  4 = None, Modified [place]/Independent    Turning over in bed (including adjusting bedclothes, sheets and blankets)?: 3  Sitting down on and standing up from a chair with arms (e.g., wheelchair, bedside commode, etc.): 3  Moving from lying on back to sitting on the side of the bed?: 3  Moving to and from a bed to a chair (including a wheelchair)?: 3  Need to walk in hospital room?: 3  Climbing  3-5 steps with a railing?: 2  Total Score: 17    AM-PAC Raw Score CMS G-Code Modifier Level of Impairment Assistance   6 % Total / Unable   7 - 9 CM 80 - 100% Maximal Assist   10 - 14 CL 60 - 80% Moderate Assist   15 - 19 CK 40 - 60% Moderate Assist   20 - 22 CJ 20 - 40% Minimal Assist   23 CI 1-20% SBA / CGA   24 CH 0% Independent/ Mod I     Patient left up in chair with all lines intact, call button in reach and wife. present.    Assessment:  Suman Hayden is a 67 y.o. male with a medical diagnosis of LVAD (left ventricular assist device) present and presents with decreased strength, mobility, transfers, balance and decreased distance ambulated. Pt would benefit from cont PT to address deficits and will need inpt rehab when medically stable. Pt will benefit from skilled PT 6x/wk to return pt to highest functional level possible. Pt is s/p  LVAD HM 3 placement , sternal washout , chest closure. . Pt was re-intubated  and extubated 17.    Rehab identified problem list/impairments: Rehab identified problem list/impairments: weakness, impaired functional mobilty, gait instability, impaired balance, decreased lower extremity function    Rehab potential is good.    Activity tolerance: Good    Discharge recommendations: Discharge Facility/Level Of Care Needs: rehabilitation facility     Barriers to discharge: Barriers to Discharge: None    Equipment recommendations: Equipment Needed After Discharge:  (BSC, rollator, w/c)     GOALS:    Physical Therapy Goals        Problem: Physical Therapy Goal    Goal Priority Disciplines Outcome Goal Variances Interventions   Physical Therapy Goal     PT/OT, PT Ongoing (interventions implemented as appropriate)     Description:  Goals to be met by: 10/9/17     Patient will increase functional independence with mobility by performin. Supine to sit with MInimal Assistance-  Met 2017  2. Sit to supine with MInimal Assistance - not met  3. Sit to stand  transfer with Minimal Assistance- met 9/8      Updated: Sit to stand transfer with supervision using RW   4. Bed to chair transfer with Minimal Assistance- not met  5. Gait  x 50 feet with Minimal Assistance. -  Met 8/29/2017  6. Lower extremity exercise program x15 reps, with supervision, in order to increase LE strength and (I) with functional mobility. - not met  7.Pt mod I supine to sit- not met  8. Pt receive gait training ~ 220 ft with AD if needed and supervision- not met                             PLAN:    Patient to be seen 6 x/week  to address the above listed problems via gait training, therapeutic activities, therapeutic exercises  Plan of Care expires: 10/09/17  Plan of Care reviewed with: patient, spouse         Astridmichael Whaley, PT  09/17/2017

## 2017-09-17 NOTE — PLAN OF CARE
Problem: Patient Care Overview  Goal: Plan of Care Review  Outcome: Ongoing (interventions implemented as appropriate)  Plan of care reviewed with patient. Patient has no complaints at this time. Patient remains on dobutamine drip at ordered rate. Patient checked off on alarms and wife checked off on dressing change. Plan for patient to be discharged next week. Patient remains free of falls and injury.

## 2017-09-17 NOTE — PROGRESS NOTES
PICC line significantly dislodged from insertion site but still in vein. Secured with tape. PICC flushes and is able to pull back blood. Notified dr mccrary, orders for xray and consult to PICC team.

## 2017-09-18 LAB
ALBUMIN SERPL BCP-MCNC: 2.3 G/DL
ALP SERPL-CCNC: 86 U/L
ALT SERPL W/O P-5'-P-CCNC: 11 U/L
ANION GAP SERPL CALC-SCNC: 6 MMOL/L
AST SERPL-CCNC: 17 U/L
BASOPHILS # BLD AUTO: 0.03 K/UL
BASOPHILS NFR BLD: 0.5 %
BILIRUB DIRECT SERPL-MCNC: 0.7 MG/DL
BILIRUB SERPL-MCNC: 1.1 MG/DL
BNP SERPL-MCNC: 1416 PG/ML
BUN SERPL-MCNC: 16 MG/DL
CALCIUM SERPL-MCNC: 8.2 MG/DL
CHLORIDE SERPL-SCNC: 106 MMOL/L
CO2 SERPL-SCNC: 29 MMOL/L
CREAT SERPL-MCNC: 1.1 MG/DL
CRP SERPL-MCNC: 6.9 MG/L
DIFFERENTIAL METHOD: ABNORMAL
EOSINOPHIL # BLD AUTO: 0.2 K/UL
EOSINOPHIL NFR BLD: 3.7 %
ERYTHROCYTE [DISTWIDTH] IN BLOOD BY AUTOMATED COUNT: 16.9 %
EST. GFR  (AFRICAN AMERICAN): >60 ML/MIN/1.73 M^2
EST. GFR  (NON AFRICAN AMERICAN): >60 ML/MIN/1.73 M^2
GLUCOSE SERPL-MCNC: 107 MG/DL
HCT VFR BLD AUTO: 24.9 %
HGB BLD-MCNC: 7.9 G/DL
INR PPP: 2.9
LDH SERPL L TO P-CCNC: 239 U/L
LYMPHOCYTES # BLD AUTO: 1.3 K/UL
LYMPHOCYTES NFR BLD: 20.2 %
MAGNESIUM SERPL-MCNC: 1.6 MG/DL
MCH RBC QN AUTO: 27.4 PG
MCHC RBC AUTO-ENTMCNC: 31.7 G/DL
MCV RBC AUTO: 87 FL
MONOCYTES # BLD AUTO: 0.7 K/UL
MONOCYTES NFR BLD: 10.5 %
NEUTROPHILS # BLD AUTO: 4.2 K/UL
NEUTROPHILS NFR BLD: 64.9 %
PHOSPHATE SERPL-MCNC: 3.1 MG/DL
PLATELET # BLD AUTO: 232 K/UL
PMV BLD AUTO: 10.1 FL
POTASSIUM SERPL-SCNC: 3.2 MMOL/L
PREALB SERPL-MCNC: 12 MG/DL
PROT SERPL-MCNC: 6.3 G/DL
PROTHROMBIN TIME: 28.6 SEC
RBC # BLD AUTO: 2.88 M/UL
SODIUM SERPL-SCNC: 141 MMOL/L
WBC # BLD AUTO: 6.45 K/UL

## 2017-09-18 PROCEDURE — 27000248 HC VAD-ADDITIONAL DAY

## 2017-09-18 PROCEDURE — 20600001 HC STEP DOWN PRIVATE ROOM

## 2017-09-18 PROCEDURE — 80048 BASIC METABOLIC PNL TOTAL CA: CPT

## 2017-09-18 PROCEDURE — 97530 THERAPEUTIC ACTIVITIES: CPT

## 2017-09-18 PROCEDURE — 86140 C-REACTIVE PROTEIN: CPT

## 2017-09-18 PROCEDURE — 99233 SBSQ HOSP IP/OBS HIGH 50: CPT | Mod: 25,,, | Performed by: INTERNAL MEDICINE

## 2017-09-18 PROCEDURE — 93750 INTERROGATION VAD IN PERSON: CPT | Performed by: STUDENT IN AN ORGANIZED HEALTH CARE EDUCATION/TRAINING PROGRAM

## 2017-09-18 PROCEDURE — 85025 COMPLETE CBC W/AUTO DIFF WBC: CPT

## 2017-09-18 PROCEDURE — 84100 ASSAY OF PHOSPHORUS: CPT

## 2017-09-18 PROCEDURE — 80076 HEPATIC FUNCTION PANEL: CPT

## 2017-09-18 PROCEDURE — 76937 US GUIDE VASCULAR ACCESS: CPT

## 2017-09-18 PROCEDURE — 85610 PROTHROMBIN TIME: CPT

## 2017-09-18 PROCEDURE — 25000003 PHARM REV CODE 250: Performed by: INTERNAL MEDICINE

## 2017-09-18 PROCEDURE — 25000003 PHARM REV CODE 250: Performed by: PHYSICIAN ASSISTANT

## 2017-09-18 PROCEDURE — 83615 LACTATE (LD) (LDH) ENZYME: CPT

## 2017-09-18 PROCEDURE — 63600175 PHARM REV CODE 636 W HCPCS: Performed by: NURSE PRACTITIONER

## 2017-09-18 PROCEDURE — 83735 ASSAY OF MAGNESIUM: CPT

## 2017-09-18 PROCEDURE — 84134 ASSAY OF PREALBUMIN: CPT

## 2017-09-18 PROCEDURE — 25000003 PHARM REV CODE 250: Performed by: NURSE PRACTITIONER

## 2017-09-18 PROCEDURE — 83880 ASSAY OF NATRIURETIC PEPTIDE: CPT

## 2017-09-18 PROCEDURE — 97535 SELF CARE MNGMENT TRAINING: CPT

## 2017-09-18 PROCEDURE — C1751 CATH, INF, PER/CENT/MIDLINE: HCPCS

## 2017-09-18 PROCEDURE — 36569 INSJ PICC 5 YR+ W/O IMAGING: CPT

## 2017-09-18 PROCEDURE — 93750 INTERROGATION VAD IN PERSON: CPT | Mod: ,,, | Performed by: INTERNAL MEDICINE

## 2017-09-18 PROCEDURE — 97116 GAIT TRAINING THERAPY: CPT

## 2017-09-18 RX ORDER — POTASSIUM CHLORIDE 20 MEQ/1
40 TABLET, EXTENDED RELEASE ORAL ONCE
Status: COMPLETED | OUTPATIENT
Start: 2017-09-18 | End: 2017-09-18

## 2017-09-18 RX ORDER — SODIUM CHLORIDE 0.9 % (FLUSH) 0.9 %
10 SYRINGE (ML) INJECTION
Status: DISCONTINUED | OUTPATIENT
Start: 2017-09-18 | End: 2017-09-22 | Stop reason: HOSPADM

## 2017-09-18 RX ORDER — SODIUM CHLORIDE 0.9 % (FLUSH) 0.9 %
10 SYRINGE (ML) INJECTION EVERY 6 HOURS
Status: DISCONTINUED | OUTPATIENT
Start: 2017-09-18 | End: 2017-09-22 | Stop reason: HOSPADM

## 2017-09-18 RX ORDER — WARFARIN 1 MG/1
1 TABLET ORAL DAILY
Status: DISCONTINUED | OUTPATIENT
Start: 2017-09-18 | End: 2017-09-19

## 2017-09-18 RX ADMIN — HYDRALAZINE HYDROCHLORIDE 50 MG: 50 TABLET ORAL at 08:09

## 2017-09-18 RX ADMIN — WARFARIN SODIUM 1 MG: 1 TABLET ORAL at 05:09

## 2017-09-18 RX ADMIN — DRONABINOL 5 MG: 2.5 CAPSULE ORAL at 08:09

## 2017-09-18 RX ADMIN — POTASSIUM CHLORIDE 40 MEQ: 1500 TABLET, EXTENDED RELEASE ORAL at 08:09

## 2017-09-18 RX ADMIN — FUROSEMIDE 40 MG: 40 TABLET ORAL at 08:09

## 2017-09-18 RX ADMIN — PANTOPRAZOLE SODIUM 40 MG: 40 TABLET, DELAYED RELEASE ORAL at 08:09

## 2017-09-18 RX ADMIN — AMLODIPINE BESYLATE 10 MG: 10 TABLET ORAL at 08:09

## 2017-09-18 RX ADMIN — HYDRALAZINE HYDROCHLORIDE 50 MG: 50 TABLET ORAL at 05:09

## 2017-09-18 RX ADMIN — HYDRALAZINE HYDROCHLORIDE 50 MG: 50 TABLET ORAL at 01:09

## 2017-09-18 RX ADMIN — PRAVASTATIN SODIUM 20 MG: 20 TABLET ORAL at 08:09

## 2017-09-18 RX ADMIN — ASPIRIN 81 MG: 81 TABLET, COATED ORAL at 08:09

## 2017-09-18 NOTE — SUBJECTIVE & OBJECTIVE
Interval History: patient feeling great this morning, walked 500 feet    Continuous Infusions:   DOBUTamine 2.5 mcg/kg/min (09/17/17 1258)     Scheduled Meds:   amlodipine  10 mg Oral Daily    aspirin  81 mg Oral Daily    dronabinol  5 mg Oral BID    furosemide  40 mg Oral BID    hydrALAZINE  50 mg Oral Q8H    lidocaine  1 patch Transdermal Q24H    pantoprazole  40 mg Oral Daily    pravastatin  20 mg Oral QHS    sodium chloride 0.9%  10 mL Intravenous Q6H     PRN Meds:sodium chloride, albuterol sulfate, bisacodyl, ceFAZolin (ANCEF) IVPB, dextrose 50%, glucagon (human recombinant), hydrocodone-acetaminophen 5-325mg, insulin aspart, ondansetron, Flushing PICC Protocol **AND** sodium chloride 0.9% **AND** sodium chloride 0.9%    Review of patient's allergies indicates:  No Known Allergies  Objective:     Vital Signs (Most Recent):  Temp: 98.5 °F (36.9 °C) (09/18/17 0845)  Pulse: 80 (09/18/17 1000)  Resp: 16 (09/18/17 0845)  BP: (!) 111/51 (09/18/17 0850)  SpO2: (!) 94 % (09/18/17 0400) Vital Signs (24h Range):  Temp:  [97.7 °F (36.5 °C)-99 °F (37.2 °C)] 98.5 °F (36.9 °C)  Pulse:  [66-96] 80  Resp:  [16-18] 16  SpO2:  [94 %-99 %] 94 %  BP: ()/(0-73) 111/51     Weight: 74.8 kg (164 lb 14.5 oz)  Body mass index is 25.07 kg/m².      Intake/Output Summary (Last 24 hours) at 09/18/17 1109  Last data filed at 09/18/17 0400   Gross per 24 hour   Intake             1013 ml   Output             1700 ml   Net             -687 ml       Hemodynamic Parameters:       Physical Exam   Constitutional: He is oriented to person, place, and time. He appears well-developed and well-nourished.   HENT:   Head: Normocephalic and atraumatic.   Eyes: EOM are normal. Pupils are equal, round, and reactive to light.   Neck: Normal range of motion. Neck supple. JVD present.   Cardiovascular: Normal rate and regular rhythm.    No murmur heard.  VAD hum smooth   Pulmonary/Chest: Effort normal and breath sounds normal.   Abdominal:  Soft. Bowel sounds are normal. He exhibits no distension. There is no tenderness. There is no guarding.   Musculoskeletal: Normal range of motion. He exhibits no edema.   Neurological: He is alert and oriented to person, place, and time.   Skin: Skin is warm and dry.   Nursing note and vitals reviewed.      Significant Labs:  CBC:    Recent Labs  Lab 09/18/17  0533   WBC 6.45   RBC 2.88*   HGB 7.9*   HCT 24.9*      MCV 87   MCH 27.4   MCHC 31.7*     BNP:    Recent Labs  Lab 09/18/17  0533   BNP 1,416*     CMP:    Recent Labs  Lab 09/18/17  0533      CALCIUM 8.2*   ALBUMIN 2.3*   PROT 6.3      K 3.2*   CO2 29      BUN 16   CREATININE 1.1   ALKPHOS 86   ALT 11   AST 17   BILITOT 1.1*      Coagulation:     Recent Labs  Lab 09/18/17  0533   INR 2.9*     LDH:    Recent Labs  Lab 09/16/17  0500 09/17/17  0600 09/18/17  0533   * 261* 239     Microbiology:  Microbiology Results (last 7 days)     ** No results found for the last 168 hours. **          I have reviewed all pertinent labs within the past 24 hours.    Estimated Creatinine Clearance: 63 mL/min (based on SCr of 1.1 mg/dL).    Diagnostic Results:  I have reviewed and interpreted all pertinent imaging results/findings within the past 24 hours.

## 2017-09-18 NOTE — PROGRESS NOTES
[facility]  Heart Transplant  Progress Note    Patient Name: Suman Hayden  MRN: [id number]  Admission Date: 7/18/2017  Hospital Length of Stay: 62 days  Attending Physician: Deejay Duff Jr.,*  Primary Care Provider: Joe Ernst MD  Principal Problem:LVAD (left ventricular assist device) present    Subjective:     Interval History: patient feeling great this morning, walked 500 feet    Continuous Infusions:   DOBUTamine 2.5 mcg/kg/min (09/17/17 1258)     Scheduled Meds:   amlodipine  10 mg Oral Daily    aspirin  81 mg Oral Daily    dronabinol  5 mg Oral BID    furosemide  40 mg Oral BID    hydrALAZINE  50 mg Oral Q8H    lidocaine  1 patch Transdermal Q24H    pantoprazole  40 mg Oral Daily    pravastatin  20 mg Oral QHS    sodium chloride 0.9%  10 mL Intravenous Q6H     PRN Meds:sodium chloride, albuterol sulfate, bisacodyl, ceFAZolin (ANCEF) IVPB, dextrose 50%, glucagon (human recombinant), hydrocodone-acetaminophen 5-325mg, insulin aspart, ondansetron, Flushing PICC Protocol **AND** sodium chloride 0.9% **AND** sodium chloride 0.9%    Review of patient's allergies indicates:  No Known Allergies  Objective:     Vital Signs (Most Recent):  Temp: 98.5 °F (36.9 °C) (09/18/17 0845)  Pulse: 80 (09/18/17 1000)  Resp: 16 (09/18/17 0845)  BP: (!) 111/51 (09/18/17 0850)  SpO2: (!) 94 % (09/18/17 0400) Vital Signs (24h Range):  Temp:  [97.7 °F (36.5 °C)-99 °F (37.2 °C)] 98.5 °F (36.9 °C)  Pulse:  [66-96] 80  Resp:  [16-18] 16  SpO2:  [94 %-99 %] 94 %  BP: ()/(0-73) 111/51     Weight: 74.8 kg (164 lb 14.5 oz)  Body mass index is 25.07 kg/m².      Intake/Output Summary (Last 24 hours) at 09/18/17 1109  Last data filed at 09/18/17 0400   Gross per 24 hour   Intake             1013 ml   Output             1700 ml   Net             -687 ml       Hemodynamic Parameters:       Physical Exam   Constitutional: He is oriented to person, place, and time. He appears well-developed and  well-nourished.   HENT:   Head: Normocephalic and atraumatic.   Eyes: EOM are normal. Pupils are equal, round, and reactive to light.   Neck: Normal range of motion. Neck supple. JVD present.   Cardiovascular: Normal rate and regular rhythm.    No murmur heard.  VAD hum smooth   Pulmonary/Chest: Effort normal and breath sounds normal.   Abdominal: Soft. Bowel sounds are normal. He exhibits no distension. There is no tenderness. There is no guarding.   Musculoskeletal: Normal range of motion. He exhibits no edema.   Neurological: He is alert and oriented to person, place, and time.   Skin: Skin is warm and dry.   Nursing note and vitals reviewed.      Significant Labs:  CBC:    Recent Labs  Lab 09/18/17  0533   WBC 6.45   RBC 2.88*   HGB 7.9*   HCT 24.9*      MCV 87   MCH 27.4   MCHC 31.7*     BNP:    Recent Labs  Lab 09/18/17  0533   BNP 1,416*     CMP:    Recent Labs  Lab 09/18/17  0533      CALCIUM 8.2*   ALBUMIN 2.3*   PROT 6.3      K 3.2*   CO2 29      BUN 16   CREATININE 1.1   ALKPHOS 86   ALT 11   AST 17   BILITOT 1.1*      Coagulation:     Recent Labs  Lab 09/18/17  0533   INR 2.9*     LDH:    Recent Labs  Lab 09/16/17  0500 09/17/17  0600 09/18/17  0533   * 261* 239     Microbiology:  Microbiology Results (last 7 days)     ** No results found for the last 168 hours. **          I have reviewed all pertinent labs within the past 24 hours.    Estimated Creatinine Clearance: 63 mL/min (based on SCr of 1.1 mg/dL).    Diagnostic Results:  I have reviewed and interpreted all pertinent imaging results/findings within the past 24 hours.    Assessment and Plan:     * LVAD (left ventricular assist device) present    -HeartMate 3 Implanted 7/27/2017 as DT   -s/p delayed chest closure (7/28/17) and extubated (7/29/17), reintubated 8/9/17 and extubated 8/13/17  -Implanted by Dr. Downing  -Continue Coumadin, Goal INR 2.0-3.0. On hold given INR supratherapeutic  -Antiplatelets : n/a  -LDH is  stable overall today. Will continue to monitor daily.  -Speed set at 5200 rpm  -Interrogation notable for no events  -Not listed for OHTx           Stage III pressure ulcer of sacral region    - wound care        Bacteremia    - ESBL bacteremia  - blood cultures from 8/29 NGTD          Atrial fibrillation    -AC, will not resume Amiodarone at this time.  Telemetry has been stable and INR is sensitive         Hyperglycemia    -per primary team        Atrial tachycardia    - [id number] - 3  -Rec restarting Amio. AC per CTS        AICD discharge    - Appropriate in the setting of VT aggravated by underlying AT/AFL (earlier during the admission). Device reprogrammed to VVI 80 this admission  -EP planning revision after discharge, will need lifevest           Hepatitis B core antibody positive since 2012              V-tach    - Will hold on resuming Amiodarone; Patient's telemetry is stable and INR is so sensitive.          Hyperlipidemia    -continue pravastatin        Essential hypertension    -Patient is pulsatile  -MAP goal 60-80 mmHg  -Blood pressure controlled over the last 24 hours  -Antihypertensive medications include Amlodipine and Hydralazine  Will monitor trends              CHF (NYHA class IV, ACC/AHA stage D)    -NICM  -Last 2D Echo 9/1/2017: LVEF 10-15%, LVEDD 6.3 cm  -Current diuretic regimen: po lasix BID  -2g Na dietary restriction, 1500 mL fluid restriction, strict I/Os              MELISSA Long  Heart Transplant  [facility]

## 2017-09-18 NOTE — PROCEDURES
"Suman Hayden is a 67 y.o. male patient.    Temp: 98.3 °F (36.8 °C) (09/18/17 1149)  Pulse: 82 (09/18/17 1200)  Resp: 18 (09/18/17 1149)  BP: (!) 79/0 (09/18/17 1150)  SpO2: 95 % (09/18/17 1149)  Weight: 74.8 kg (164 lb 14.5 oz) (09/18/17 0700)  Height: 5' 8" (172.7 cm) (09/12/17 1100)    PICC  Date/Time: 9/18/2017 1:10 PM  Performed by: CHEYANNE SOARES  Consent Done: Yes  Time out: Immediately prior to procedure a time out was called to verify the correct patient, procedure, equipment, support staff and site/side marked as required  Indications: med administration, vascular access and hemodynamic monitoring  Anesthesia: local infiltration  Local anesthetic: lidocaine 1% without epinephrine  Anesthetic Total (mL): 3  Preparation: skin prepped with ChloraPrep  Skin prep agent dried: skin prep agent completely dried prior to procedure  Sterile barriers: all five maximum sterile barriers used - cap, mask, sterile gown, sterile gloves, and large sterile sheet  Hand hygiene: hand hygiene performed prior to central venous catheter insertion  Location details: right brachial  Catheter type: double lumen  Catheter size: 5 Fr  Catheter Length: 36cm    Ultrasound guidance: yes  Vessel Caliber: medium and patent, compressibility normal  Vascular Doppler: not done  Needle advanced into vessel with real time Ultrasound guidance.  Guidewire confirmed in vessel.  Image recorded and saved.  Sterile sheath used.  no esophageal manometryNumber of attempts: 1  Post-procedure: blood return through all ports, chlorhexidine patch and sterile dressing applied  Technical procedures used: 3CG  Specimens: No  Implants: No  Assessment: placement verified by x-ray  Complications: none        Liv Eid  9/18/2017  "

## 2017-09-18 NOTE — PLAN OF CARE
Problem: Physical Therapy Goal  Goal: Physical Therapy Goal  Goals to be met by: 10/9/17     Patient will increase functional independence with mobility by performin. Supine to sit with MInimal Assistance-  Met 2017  2. Sit to supine with MInimal Assistance - not met  3. Sit to stand transfer with Minimal Assistance- met       Updated: Sit to stand transfer with supervision using RW   4. Bed to chair transfer with Minimal Assistance- not met  5. Gait  x 50 feet with Minimal Assistance. -  Met 2017  6. Lower extremity exercise program x15 reps, with supervision, in order to increase LE strength and (I) with functional mobility. - not met  7.Pt mod I supine to sit- not met  8. Pt receive gait training ~ 220 ft with AD if needed and supervision- not met            Outcome: Ongoing (interventions implemented as appropriate)  Pt progressing towards therapy goals; continue current POC.     Tonja Vallecillo, PT, DPT   2017  Pager: [phone]

## 2017-09-18 NOTE — PROCEDURES
Patient lying on sofa  with wife at bedside. VAD interrogation completed this AM in the event changes needed to be made. Will continue to monitor for further issues.     Pulsatile: Yes   VAD Sounds: HM3  Smooth  Problems / Issues / Alarms with VAD if any: None noted  HCT: 24.9   Modular Cable Connection Intact(no yellow exposed): YES cdi loosens and tightens easily.     VAD Interrogation:  TXP LVAD INTERROGATIONS 9/18/2017 9/18/2017 9/18/2017 9/17/2017 9/17/2017 9/17/2017 9/17/2017   Type HeartMate3 HeartMate3 HeartMate3 HeartMate3 HeartMate3 HeartMate3 HeartMate3   Flow 4.4 4.4 4.3 4.1 4.3 4.3 4.3   Speed 5200 5200 5200 5200 5200 5200 5200   PI 3.6 3.7 3.5 3.6 3.5 3.5 3.5   Power ([place]) 3.5 3.7 3.5 3.7 3.6 3.7 3.5   LSL - - - 4800 - - 4800   Pulsatility - - - - - - -   Some recent data might be hidden   }

## 2017-09-18 NOTE — PLAN OF CARE
Problem: Patient Care Overview  Goal: Plan of Care Review  Outcome: Ongoing (interventions implemented as appropriate)  Patient progressing toward all goals. Plan of care discussed with patient, no questions at this time. Patient ambulating independently, fall precautions in place. VS & LVAD numbers WNL.  infusing; hopeful d/c Wednesday; Will monitor.

## 2017-09-18 NOTE — CONSULTS
Double lumen PICC placed to right brachial vein.  36cm in length, 31cm arm circumference, 0cm exposed.  Lot# [id number].

## 2017-09-18 NOTE — PLAN OF CARE
[facility]   Heart Transplant/VAD Clinic   [address] [place] [address]   [phone] after hours          [phone] fax     VAD HOME  HEALTH ORDERS      Admit to Home Health    Diagnosis:   Patient Active Problem List   Diagnosis    Nonischemic cardiomyopathy    CHF (NYHA class IV, ACC/AHA stage D)    Encounter for monitoring amiodarone therapy    Essential hypertension    Hyperlipidemia    V-tach    Elevated PSA    Hepatitis B core antibody positive since 2012    Chronic systolic congestive heart failure    Heart transplant candidate    Hyperbilirubinemia    AICD discharge    Syncope and collapse    Atrial tachycardia    Palliative care encounter    Hyperglycemia    AICD (automatic cardioverter/defibrillator) present    LVAD (left ventricular assist device) present    Atrial fibrillation    Heart replaced by heart assist device    Bacteremia    Stage III pressure ulcer of sacral region       Patient is homebound due to:  CHF    Diet: Low Fat, Low cholesterol, 2Gm Na, Coumadin restrictions.    Acitivities: No Swimming, bathing, vacuuming, contact sports.    Fresh implants= Sternal Precautions    Nursing:   SN to complete comprehensive assessment including routine vital signs. Instruct on disease process and s/s of complications to report to MD. Review/verify medication list sent home with the patient at time of discharge  and instruct patient/caregiver as needed. Frequency may be adjusted depending on start of care date.    **LVAD driveline exit site dressing change is to be completed per LVAD patient/caregiver only**.    Notify MD if SBP > 160 or < 90; DBP > 90 or < 50; HR > 120 or < 50; Temp > 101; Weight gain >3lbs in 1 day or 5lbs in 1 week.  Other:         LABS:  SN to perform labs: PT/INR per Coumadin clinic [phone].   Follow up INR date: 9/27/2017  No Finger Sticks      HOME INFUSION THERAPY:    SN to perform Infusion  Therapy/Central Line Care.  Review Central Line Care & Central Line Flush with patient.    Administer (drug and dose): dobutamine 2.5 mcg/kg/min IV dosing weight 74 kg      Central line care:  Scrub the Hub: Prior to accessing the line, always perform a 30 second alcohol scrub  Each lumen of the central line is to be flushed at least daily with 10 mL Normal Saline and 3 mL Heparin flush (100 units/mL)  Skilled Nurse (SN) may draw blood from IV access  Blood Draw Procedure:   - Aspirate at least 5 mL of blood   - Discard   - Obtain specimen   - Change posiflow cap   - Flush with 20 mL Normal Saline followed by a                 3-5 mL Heparin flush (100 units/mL)  Central :   - Sterile dressing changes are done weekly and as needed.   - Use chlor-hexadine scrub to cleanse site, apply Biopatch to insertion site,       apply securement device dressing   - Posi-flow caps are changed weekly and after EVERY lab draw.   - If sterile gauze is under dressing to control oozing,                 dressing change must be performed every 24 hours until gauze is not needed.    CONSULTS:      Physical Therapy to evaluate and treat. Evaluate for home safety and equipment needs; Establish/upgrade home exercise program. Perform / instruct on therapeutic exercises, gait training, transfer training, and Range of Motion.    Occupational Therapy to evaluate and treat. Evaluate home environment for safety and equipment needs. Perform/Instruct on transfers, ADL training, ROM, and therapeutic exercises.        Send initial Home Health orders to [facility] attending physician on call.  Send follow up questions to VAD clinic MD [phone] or [phone].

## 2017-09-18 NOTE — PT/OT/SLP PROGRESS
"Physical Therapy  Treatment    Suman Hayden   MRN: [id number]   Admitting Diagnosis: LVAD (left ventricular assist device) present    PT Received On: 09/18/17  PT Start Time: 1116     PT Stop Time: 1155    PT Total Time (min): 39 min       Billable Minutes:  Gait [id number] min and Therapeutic Activity 11 min    Treatment Type: Treatment  PT/PTA: PT     PTA Visit Number: 0       General Precautions: Standard, LVAD, fall  Orthopedic Precautions: N/A   Braces: N/A    Do you have any cultural, spiritual, Evangelical conflicts, given your current situation?: none noted     Subjective:  Communicated with RN prior to session. Pt agreeable to participate in therapy session. Pt's wife at bedside. Pt reported no new complaints; reported feeling "good" today.     Pain/Comfort  Pain Rating 1: 0/10  Pain Rating Post-Intervention 1: 0/10    Objective:   Patient found with: PICC line, telemetry (LVAD to battery power; life vest)    Functional Mobility:  Bed Mobility:    N/T 2/2 pt sitting up in chair upon PT arrival.     Transfers:  Sit <> Stand Assistance: Minimum Assistance (from couch x 1 trial; from wheelchair x 2 trials )  Sit <> Stand Assistive Device: No Assistive Device, 4 wheeled walker  Toilet Transfer Assistance: Minimum Assistance  Toilet Transfer Assistive Device:  (grab bars)    Gait:   Gait Distance:   · 1st trial: 400 ft. with rollator, CGA with 1 standing rest break at USP point   · 2nd trial: 200 ft. + 200 ft. With rollator, CGA with 1 seated rest break between trials.   Assistance 1: Contact Guard Assistance  Gait Assistive Device: Rollator  Gait Pattern: reciprocal  Gait Deviation(s): decreased hal, decreased step length, backward lean (narrow base of support)    Balance:   Static Sit: GOOD: Takes MODERATE challenges from all directions  Dynamic Sit: GOOD: Maintains balance through MODERATE excursions of active trunk movement  Static Stand: FAIR: Maintains without assist but unable to take " challenges  Dynamic stand: FAIR: Needs CONTACT GUARD during gait     Therapeutic Activities and Exercises:  Therapist facilitated progression of gait training to improve gait stability, endurance, and independence with functional ambulation.  Pt and wife educated on rollator management, scanning environment for obstacles, and energy conservation. Pt provided with encouragement during gait trials to increase gait distance up to tolerance, no reports of SOB.      Therapeutic activities aimed to increase pt's independence, safety, and efficiency with functional transfers. See above for assistance levels. Pt required cueing for safety with line management and rollator management (i.e. Locking breaks, sitting on rollator, etc.) Pt and wife also educated on recommendations for continued therex for BLE strengthening. Pt and wife verbalized understanding of education provided and expressed no further questions.       AM-PAC 6 CLICK MOBILITY  How much help from another person does this patient currently need?   1 = Unable, Total/Dependent Assistance  2 = A lot, Maximum/Moderate Assistance  3 = A little, Minimum/Contact Guard/Supervision  4 = None, Modified [place]/Independent    Turning over in bed (including adjusting bedclothes, sheets and blankets)?: 4  Sitting down on and standing up from a chair with arms (e.g., wheelchair, bedside commode, etc.): 3  Moving from lying on back to sitting on the side of the bed?: 3  Moving to and from a bed to a chair (including a wheelchair)?: 3  Need to walk in hospital room?: 3  Climbing 3-5 steps with a railing?: 2  Total Score: 18    AM-PAC Raw Score CMS G-Code Modifier Level of Impairment Assistance   6 % Total / Unable   7 - 9 CM 80 - 100% Maximal Assist   10 - 14 CL 60 - 80% Moderate Assist   15 - 19 CK 40 - 60% Moderate Assist   20 - 22 CJ 20 - 40% Minimal Assist   23 CI 1-20% SBA / CGA   24 CH 0% Independent/ Mod I     Patient left up in chair with all lines intact,  call button in reach, RN notified and wife present.    Assessment:  Suman Hayden is a 67 y.o. male with a medical diagnosis of LVAD (left ventricular assist device) present. Pt progressing well with endurance and gait stability. Pt able to increase gait distance significantly this visit, indicating gradual improvement in cardiopulmonary endurance and strength. Pt continues to demonstrate decreased safety awareness and balance deficits, placing pt at continued risk for falls. Pt would benefit from continued PT intervention to address below listed deficits and maximize return to PLOF. ]    Rehab identified problem list/impairments: Rehab identified problem list/impairments: weakness, impaired endurance, impaired functional mobilty, gait instability, impaired balance, decreased safety awareness, impaired cardiopulmonary response to activity    Rehab potential is good.    Activity tolerance: Good    Discharge recommendations: Discharge Facility/Level Of Care Needs: home health PT, home health OT (with  assistance)     Barriers to discharge: Barriers to Discharge: None    Equipment recommendations: Equipment Needed After Discharge: bedside commode, wheelchair, rollator     GOALS:    Physical Therapy Goals        Problem: Physical Therapy Goal    Goal Priority Disciplines Outcome Goal Variances Interventions   Physical Therapy Goal     PT/OT, PT Ongoing (interventions implemented as appropriate)     Description:  Goals to be met by: 10/9/17     Patient will increase functional independence with mobility by performin. Supine to sit with MInimal Assistance-  Met 2017  2. Sit to supine with MInimal Assistance - not met  3. Sit to stand transfer with Minimal Assistance- met       Updated: Sit to stand transfer with supervision using RW   4. Bed to chair transfer with Minimal Assistance- not met  5. Gait  x 50 feet with Minimal Assistance. -  Met 2017  6. Lower extremity exercise program x15 reps, with  supervision, in order to increase LE strength and (I) with functional mobility. - not met  7.Pt mod I supine to sit- not met  8. Pt receive gait training ~ 220 ft with AD if needed and supervision- not met                             PLAN:    Patient to be seen 6 x/week  to address the above listed problems via gait training, therapeutic activities, therapeutic exercises, neuromuscular re-education  Plan of Care expires: 10/09/17  Plan of Care reviewed with: patient, spouse        Tonja Vallecillo, PT, DPT   9/18/2017  Pager: [phone]

## 2017-09-18 NOTE — PT/OT/SLP PROGRESS
Occupational Therapy  Treatment/POC Change    Suman Hayden   MRN: [id number]   Admitting Diagnosis: LVAD (left ventricular assist device) present    OT Date of Treatment: 09/18/17   OT Start Time: 1548  OT Stop Time: 1618  OT Total Time (min): 30 min    Billable Minutes:  Self Care/Home Management 12 and Therapeutic Activity 18    General Precautions: Standard, LVAD, fall  Orthopedic Precautions: N/A  Braces: N/A    Subjective:  Communicated with RN prior to session. Pt agreeable to OT.    Pain/Comfort  Pain Rating 1: 0/10  Pain Rating Post-Intervention 1: 0/10    Objective:  Patient found with: PICC line, telemetry (LVAD to battery power, Life Vest)     Functional Mobility:  Bed Mobility: NT as pt EOB and left up in chair     Transfers:  Sit <> Stand Assistance: Stand By Assistance from EOB and 1 trial from W/C, Contact Guard Assistance 1 trial from W/C  Sit <> Stand Assistive Device: Rolling Walker    Functional Ambulation: Within room and hallway with close SBA-CGA ~400 ft using RW with 2 seated rest breaks    Activities of Daily Living:  UE Dressing Level of Assistance: Moderate assistance to don LVAD vest  LE Dressing Level of Assistance: Maximum assistance to don shoes EOB  Toileting Where Assessed: Other (Comment) (EOB)  Toileting Level of Assistance: Set-up Assistance to use urinal    Balance:   Static Sit: NORMAL: No deviations seen in posture held statically  Dynamic Sit: GOOD+: Maintains balance through MAXIMAL excursions of active trunk motion  Static Stand: GOOD-: Takes MODERATE challenges from all directions inconsistently  Dynamic stand: GOOD-: Needs SUPERVISION only during gait and able to self right with moderate     Therapeutic Activities and Exercises:  Pt EOB upon OT entry; on battery power but not in vest; required Mod A to don vest and Max A to don shoes prior to ambulation; T/Fs with SBA-CGA and cues for using RW; able to perform functional mobility around loop with 2 seated rest breaks    AM-PAC  "6 CLICK ADL   How much help from another person does this patient currently need?   1 = Unable, Total/Dependent Assistance  2 = A lot, Maximum/Moderate Assistance  3 = A little, Minimum/Contact Guard/Supervision  4 = None, Modified [place]/Independent    Putting on and taking off regular lower body clothing? : 2  Bathing (including washing, rinsing, drying)?: 2  Toileting, which includes using toilet, bedpan, or urinal? : 2  Putting on and taking off regular upper body clothing?: 3  Taking care of personal grooming such as brushing teeth?: 3  Eating meals?: 4  Total Score: 16     AM-PAC Raw Score CMS "G-Code Modifier Level of Impairment Assistance   6 % Total / Unable   7 - 8 CM 80 - 100% Maximal Assist   9-13 CL 60 - 80% Moderate Assist   14 - 19 CK 40 - 60% Moderate Assist   20 - 22 CJ 20 - 40% Minimal Assist   23 CI 1-20% SBA / CGA   24 CH 0% Independent/ Mod I       Patient left up in chair with all lines intact, call button in reach and wife present    ASSESSMENT:  Suman Hayden is a 67 y.o. male with a medical diagnosis of LVAD (left ventricular assist device) present and presents with good effort and participation in therapy. Pt continues to improve independence with ADLs and safety and progressing well toward goals. D/C recs changed from rehab to HH.    Rehab identified problem list/impairments: Rehab identified problem list/impairments: weakness, impaired endurance, impaired functional mobilty, impaired cardiopulmonary response to activity, decreased safety awareness    Rehab potential is good.    Activity tolerance: Good    Discharge recommendations: Discharge Facility/Level Of Care Needs: home with home health     Barriers to discharge: Barriers to Discharge: None    Equipment recommendations: bedside commode, wheelchair, rollator     GOALS:    Occupational Therapy Goals        Problem: Occupational Therapy Goal    Goal Priority Disciplines Outcome Interventions   Occupational Therapy Goal     " OT, PT/OT Ongoing (interventions implemented as appropriate)    Description:  Goals to be met by:  2 weeks 9/25/17    Patient will increase functional independence with ADLs by performing:  Feeding: Independent   UE Dressing with Supervision.  LE Dressing with Supervision.  Grooming while standing with Supervision.  Toileting from toilet with Supervision for hygiene and clothing management.   Stand pivot transfers with Supervision.  Toilet transfer to toilet with Supervision.  Pt will be supervision with LVAD yasmin't.     Goals to be met by:  2 weeks 9/12/17    Patient will increase functional independence with ADLs by performing:  Feeding: Independent   UE Dressing with Supervision.  LE Dressing with Supervision.  Grooming while standing with Supervision.  Toileting from toilet with Supervision for hygiene and clothing management.   Stand pivot transfers with Supervision.  Toilet transfer to toilet with Supervision.  Pt will be supervision  with LVAD yasmin't.     Goals to be met by:  2 weeks 8/28/17    Patient will increase functional independence with ADLs by performing:  Feeding: Independent   UE Dressing with Supervision.  LE Dressing with Supervision.  Grooming while standing with Supervision.  Toileting from toilet with Supervision for hygiene and clothing management.   Stand pivot transfers with Supervision.  Toilet transfer to toilet with Supervision.  Pt will be supervision  with LVAD yasmin't.                    Multidisciplinary Problems (Resolved)        Problem: Occupational Therapy Goal    Goal Priority Disciplines Outcome Interventions   Occupational Therapy Goal   (Resolved)     OT, PT/OT  Error    Description:  Goals to be met by: 8/04/2017    Patient will increase functional independence with ADLs by performing:    Increased functional strength to 5/5 for improved ADL performance.  Upper extremity exercise program and R LE ankle pumps  3x 10 reps per handout, with independence.                       Plan:  Patient to be seen 5 x/week to address the above listed problems via self-care/home management, therapeutic activities, therapeutic exercises  Plan of Care expires: 09/21/17  Plan of Care reviewed with: spouse, patient         DELMY Mohamud  09/18/2017

## 2017-09-19 LAB
ANION GAP SERPL CALC-SCNC: 7 MMOL/L
BASOPHILS # BLD AUTO: 0.04 K/UL
BASOPHILS NFR BLD: 0.6 %
BUN SERPL-MCNC: 17 MG/DL
CALCIUM SERPL-MCNC: 8.1 MG/DL
CHLORIDE SERPL-SCNC: 105 MMOL/L
CO2 SERPL-SCNC: 28 MMOL/L
CREAT SERPL-MCNC: 1 MG/DL
DIFFERENTIAL METHOD: ABNORMAL
EOSINOPHIL # BLD AUTO: 0.2 K/UL
EOSINOPHIL NFR BLD: 3.2 %
ERYTHROCYTE [DISTWIDTH] IN BLOOD BY AUTOMATED COUNT: 17 %
EST. GFR  (AFRICAN AMERICAN): >60 ML/MIN/1.73 M^2
EST. GFR  (NON AFRICAN AMERICAN): >60 ML/MIN/1.73 M^2
GLUCOSE SERPL-MCNC: 66 MG/DL
HCT VFR BLD AUTO: 24.1 %
HGB BLD-MCNC: 7.7 G/DL
INR PPP: 2.7
LDH SERPL L TO P-CCNC: 249 U/L
LYMPHOCYTES # BLD AUTO: 1.5 K/UL
LYMPHOCYTES NFR BLD: 21.2 %
MAGNESIUM SERPL-MCNC: 1.6 MG/DL
MCH RBC QN AUTO: 27.4 PG
MCHC RBC AUTO-ENTMCNC: 32 G/DL
MCV RBC AUTO: 86 FL
MONOCYTES # BLD AUTO: 0.7 K/UL
MONOCYTES NFR BLD: 9.1 %
NEUTROPHILS # BLD AUTO: 4.7 K/UL
NEUTROPHILS NFR BLD: 65.6 %
PHOSPHATE SERPL-MCNC: 2.8 MG/DL
PLATELET # BLD AUTO: 213 K/UL
PMV BLD AUTO: 9.9 FL
POTASSIUM SERPL-SCNC: 3.5 MMOL/L
PROTHROMBIN TIME: 26.6 SEC
RBC # BLD AUTO: 2.81 M/UL
SODIUM SERPL-SCNC: 140 MMOL/L
WBC # BLD AUTO: 7.11 K/UL

## 2017-09-19 PROCEDURE — 99233 SBSQ HOSP IP/OBS HIGH 50: CPT | Mod: 24,,, | Performed by: THORACIC SURGERY (CARDIOTHORACIC VASCULAR SURGERY)

## 2017-09-19 PROCEDURE — 63600175 PHARM REV CODE 636 W HCPCS: Performed by: PHYSICIAN ASSISTANT

## 2017-09-19 PROCEDURE — 97530 THERAPEUTIC ACTIVITIES: CPT

## 2017-09-19 PROCEDURE — 97116 GAIT TRAINING THERAPY: CPT

## 2017-09-19 PROCEDURE — 25000003 PHARM REV CODE 250: Performed by: THORACIC SURGERY (CARDIOTHORACIC VASCULAR SURGERY)

## 2017-09-19 PROCEDURE — 97803 MED NUTRITION INDIV SUBSEQ: CPT

## 2017-09-19 PROCEDURE — G9169 MEMORY GOAL STATUS: HCPCS | Mod: CK

## 2017-09-19 PROCEDURE — A4216 STERILE WATER/SALINE, 10 ML: HCPCS | Performed by: THORACIC SURGERY (CARDIOTHORACIC VASCULAR SURGERY)

## 2017-09-19 PROCEDURE — 85610 PROTHROMBIN TIME: CPT

## 2017-09-19 PROCEDURE — 25000003 PHARM REV CODE 250: Performed by: NURSE PRACTITIONER

## 2017-09-19 PROCEDURE — G9168 MEMORY CURRENT STATUS: HCPCS | Mod: CK

## 2017-09-19 PROCEDURE — A4216 STERILE WATER/SALINE, 10 ML: HCPCS | Performed by: INTERNAL MEDICINE

## 2017-09-19 PROCEDURE — 20600001 HC STEP DOWN PRIVATE ROOM

## 2017-09-19 PROCEDURE — G9170 MEMORY D/C STATUS: HCPCS | Mod: CK

## 2017-09-19 PROCEDURE — 85025 COMPLETE CBC W/AUTO DIFF WBC: CPT

## 2017-09-19 PROCEDURE — 25000003 PHARM REV CODE 250: Performed by: INTERNAL MEDICINE

## 2017-09-19 PROCEDURE — 93750 INTERROGATION VAD IN PERSON: CPT | Mod: ,,, | Performed by: INTERNAL MEDICINE

## 2017-09-19 PROCEDURE — 25000003 PHARM REV CODE 250: Performed by: PHYSICIAN ASSISTANT

## 2017-09-19 PROCEDURE — 99233 SBSQ HOSP IP/OBS HIGH 50: CPT | Mod: ,,, | Performed by: INTERNAL MEDICINE

## 2017-09-19 PROCEDURE — 97110 THERAPEUTIC EXERCISES: CPT

## 2017-09-19 PROCEDURE — 83735 ASSAY OF MAGNESIUM: CPT

## 2017-09-19 PROCEDURE — 63600175 PHARM REV CODE 636 W HCPCS: Performed by: NURSE PRACTITIONER

## 2017-09-19 PROCEDURE — 80048 BASIC METABOLIC PNL TOTAL CA: CPT

## 2017-09-19 PROCEDURE — 83615 LACTATE (LD) (LDH) ENZYME: CPT

## 2017-09-19 PROCEDURE — 92523 SPEECH SOUND LANG COMPREHEN: CPT

## 2017-09-19 PROCEDURE — 84100 ASSAY OF PHOSPHORUS: CPT

## 2017-09-19 PROCEDURE — 93750 INTERROGATION VAD IN PERSON: CPT | Mod: ,,, | Performed by: THORACIC SURGERY (CARDIOTHORACIC VASCULAR SURGERY)

## 2017-09-19 PROCEDURE — 27000248 HC VAD-ADDITIONAL DAY

## 2017-09-19 RX ORDER — POTASSIUM CHLORIDE 20 MEQ/1
40 TABLET, EXTENDED RELEASE ORAL 2 TIMES DAILY
Status: DISCONTINUED | OUTPATIENT
Start: 2017-09-19 | End: 2017-09-22 | Stop reason: HOSPADM

## 2017-09-19 RX ORDER — WARFARIN 2 MG/1
2 TABLET ORAL DAILY
Status: DISCONTINUED | OUTPATIENT
Start: 2017-09-19 | End: 2017-09-22 | Stop reason: HOSPADM

## 2017-09-19 RX ADMIN — Medication 10 ML: at 12:09

## 2017-09-19 RX ADMIN — HYDRALAZINE HYDROCHLORIDE 50 MG: 50 TABLET ORAL at 01:09

## 2017-09-19 RX ADMIN — PANTOPRAZOLE SODIUM 40 MG: 40 TABLET, DELAYED RELEASE ORAL at 08:09

## 2017-09-19 RX ADMIN — Medication 10 ML: at 05:09

## 2017-09-19 RX ADMIN — FUROSEMIDE 40 MG: 40 TABLET ORAL at 08:09

## 2017-09-19 RX ADMIN — DOBUTAMINE IN DEXTROSE 2.5 MCG/KG/MIN: 200 INJECTION, SOLUTION INTRAVENOUS at 05:09

## 2017-09-19 RX ADMIN — PRAVASTATIN SODIUM 20 MG: 20 TABLET ORAL at 08:09

## 2017-09-19 RX ADMIN — ASPIRIN 81 MG: 81 TABLET, COATED ORAL at 08:09

## 2017-09-19 RX ADMIN — DRONABINOL 5 MG: 2.5 CAPSULE ORAL at 08:09

## 2017-09-19 RX ADMIN — WARFARIN SODIUM 2 MG: 2 TABLET ORAL at 05:09

## 2017-09-19 RX ADMIN — HYDRALAZINE HYDROCHLORIDE 50 MG: 50 TABLET ORAL at 08:09

## 2017-09-19 RX ADMIN — POTASSIUM CHLORIDE 40 MEQ: 1500 TABLET, EXTENDED RELEASE ORAL at 08:09

## 2017-09-19 RX ADMIN — Medication 10 ML: at 06:09

## 2017-09-19 RX ADMIN — Medication 10 ML: at 08:09

## 2017-09-19 RX ADMIN — HYDRALAZINE HYDROCHLORIDE 50 MG: 50 TABLET ORAL at 05:09

## 2017-09-19 RX ADMIN — AMLODIPINE BESYLATE 10 MG: 10 TABLET ORAL at 08:09

## 2017-09-19 NOTE — PT/OT/SLP EVAL
Speech Language Pathology Evaluation    Suman Hayden   MRN: [id number]   CTSU 312/CTSU 312 A    Admitting Diagnosis: LVAD (left ventricular assist device) present    Diet recommendations: Solid Diet Level: Regular  Liquid Diet Level: Thin universal aspiration precautions    SLP Treatment Date: 09/19/17  Speech Start Time: 1330     Speech Stop Time: 1355     Speech Total (min): 25 min       TREATMENT BILLABLE MINUTES:  Eval 25     Diagnosis: LVAD (left ventricular assist device) present      Past Medical History:   Diagnosis Date    Cardiomyopathy     Hyperlipidemia     Hypertension     Obesity     S/P implantation of automatic cardioverter/defibrillator (AICD)     Ventricular tachycardia      Past Surgical History:   Procedure Laterality Date    CARDIAC CATHETERIZATION      CARDIAC DEFIBRILLATOR PLACEMENT         Has the patient been evaluated by SLP for swallowing? : Yes  Keep patient NPO?: No   General Precautions: Standard, fall, LVAD    Current Respiratory Status: nasal cannula       Subjective:  Pt awake and cooperative. Wife not present at time of evaluation. Pt familiar to SLP.  Patient goals: to go home tomorrow.      Objective:      COGNITIVE STATUS:  Behavioral Observations: alert, appropriate and cooperative-  Memory:  · Immediate: impaired for 5+ number lists and 4+ word lists  · Short Term: impaired  · Long Term: impaired for general information questions  Orientation: oriented x4  Attention: required some repetitions of instructions throughout evaluation.  Problem Solving: WFL, pt answered functional hypothetical situational questions with 100% acc; staff reported some functional safety awareness impairments  Sequencing:   · Functional Events: wfl  · Mental Manipulation: impaired for 3+ word lists  Pragmatics: wfl  Simple Money/Time Management: impaired    LANGUAGE:   Receptive Language:  Simple y/n Questions: wfl  Complex y/n Questions: wfl  Identification: wfl  1 Step Directions: wfl  2 Step  Directions: wfl  Complex Directions: impaired <50% acc despite repetition of instructions    Expressive Language:   Naming:   · Divergent: impaired; named 9 items in a concrete category in 60 seconds given cues (norm is 15-20)  · Convergent: wfl  · Confrontational: wfl  Automatic Speech: wfl  Sentence Completion: wfl  Responsive Speech: wfl  Repetition: wfl    Motor Speech: no noted apraxia of speech or dysarthria    Voice: wfl  Augmentative Alternative Communication: no    Assessment:  Suman Hayden is a 67 y.o. male with a SLP diagnosis of cognitive-linguistic impairments. ST will continue to follow. Pt would benefit from ST s/p d/c.     Do you have any cultural, spiritual, Confucianist conflicts, given your current situation?: none known    Discharge recommendations: Discharge Facility/Level Of Care Needs: home with home health, home health speech therapy, home health OT, home health PT     Goals:    SLP Goals        Problem: SLP Goal    Goal Priority Disciplines Outcome   SLP Goal     SLP Ongoing (interventions implemented as appropriate)   Description:  Short Term Goals:   1. Pt will complete 3-4 word mental manipulation tasks given 1 repetition with 80% acc min cues.   2. Pt will answer general information questions with 90% acc no cues.   3. Pt will name 10 items in a category in 60 seconds with no cues.   4. Pt will complete money/time management tasks with 90% acc min cues.  5. Pt will immediately recall 5+ number lists with 90% acc no repetitions.   6. Pt will follow multi-unit commands with 85% acc no repetitions.               Multidisciplinary Problems (Resolved)        Problem: SLP Goal    Goal Priority Disciplines Outcome   SLP Goal   (Resolved)     SLP Outcome(s) achieved   Description:  Speech Language Pathology Goals  Goals expected to be met by 8/4:  1. Pt will tolerate a dental soft diet and thin liquids without s/s of aspiration.                Problem: SLP Goal    Goal Priority Disciplines Outcome    SLP Goal   (Resolved)     SLP Outcome(s) achieved   Description:  Speech Language Pathology Goals  Goals expected to be met by 9/6   1. Pt will tolerate thin liquids with no overt s/s of aspiration.   2. Pt will tolerate dental soft diet with adequate a-p transit and no overt s/s of aspiration                            Plan:   Patient to be seen Therapy Frequency: 4 x/week   Plan of Care expires: 10/18/17  Plan of Care reviewed with: patient  SLP Follow-up?: Yes         SLP G-Codes  Functional Assessment Tool Used: noms  Score: 4  Functional Limitations: Memory  Memory Current Status (): CK  Memory Goal Status (): CK  Memory Discharge Status (): SHERRI Bueno, CCC-SLP   Pager: [phone]  09/19/2017

## 2017-09-19 NOTE — PLAN OF CARE
Problem: Patient Care Overview  Goal: Plan of Care Review  Outcome: Ongoing (interventions implemented as appropriate)  Patient cooperative and pleasant with routine care and procedures.  Reinforced fall precautions and patient verbalized understanding.  Wife anticipating discharge to home on Wednesday.  Life vest in place and patient without complaints.  Will continue to monitor.

## 2017-09-19 NOTE — PROGRESS NOTES
[facility]  Adult Nutrition  Progress Note    SUMMARY     Recommendations    Recommendation/Intervention:   1. Continue current diet order and oral supplements.   2. Discussed and reinforced need for low salt diet at home with wife, who prepares meals at home.   3. Recommended to continue Boost Plus at home if PO intake decreases.  Goals: Meet >/=85% of EEN/EPN  Nutrition Goal Status: progressing towards goal  Communication of RD Recs: reviewed with RN    Reason for Assessment    Reason for Assessment: RD follow-up  Diagnosis:  (s/p LVAD)  Relevent Medical History: NICM, HTN, HLD   Interdisciplinary Rounds: did not attend     General Information Comments: Pt seated at bed eating breakfast with wife at time of visit. Pt reports improved appetite and is consuming most of meals with Boost and Arginaid. Wife states they will continue to follow a low-salt diet with Boost or Ensure as needed after discharge.    Nutrition Discharge Planning: Adequate nutrition    Nutrition Prescription Ordered    Current Diet Order: Regular diet  Nutrition Order Comments: -  Current Nutrition Support Formula Ordered: Discontinued  Current Nutrition Support Rate Ordered: 40 (ml)  Current Nutrition Support Frequency Ordered: mL/hr  Oral Nutrition Supplement: Boost Plus TID; Arginaid BID     Evaluation of Received Nutrients/Fluid Intake    Fluid Required: meeting needs  Tolerance: tolerating  % Intake of Estimated Energy Needs: 75 - 100 % b/w meals & oral supplements  % Meal Intake: 75%     Nutrition Risk Screen     Nutrition Risk Screen: no indicators present    Nutrition/Diet History    Patient Reported Diet/Restrictions/Preferences: general, low salt  Typical Food/Fluid Intake: Improved  Food Preferences: No Gnosticism/cultural prefs reported     Labs/Tests/Procedures/Meds    Pertinent Labs Reviewed: reviewed  Pertinent Labs Comments: Alb 2.3, PAB 12, CRP 6.9  Pertinent Medications Reviewed: reviewed,  "pertinent  Pertinent Medications Comments: Dronabinol, pantoprazole, statin    Physical Findings    Overall Physical Appearance: lethargic, nourished  Tubes: other (see comments) (none)  Oral/Mouth Cavity: tooth/teeth missing  Skin: pressure ulcer(s), incision (chest incision; healing Stg 3 PU to sacral spine)    Anthropometrics    Temp: 97.9 °F (36.6 °C)     Height: 5' 8" (172.7 cm)  Weight Method: Standard Scale  Weight: 75.8 kg (167 lb 1.7 oz)  Ideal Body Weight (IBW), Male: 154 lb     % Ideal Body Weight, Male (lb): 111.38 lb     BMI (Calculated): 26.1  BMI Grade: 25 - 29.9 - overweight     Usual Body Weight (UBW), k.8 kg (admit wt)     % Usual Body Weight: 115.78  % Weight Change From Usual Weight: -15.78 %     Estimated/Assessed Needs    Weight Used For Calorie Calculations: 75.8 kg (167 lb 1.7 oz)      Energy Calorie Requirements (kcal):   Energy Need Method: [place]-St Jeor (x 1.25 (PAL))     RMR ([place]-St. Jeor Equation): 1507.5        Weight Used For Protein Calculations: 75.8 kg (167 lb 1.7 oz)  Protein Requirements:  gm (1.2-1.4 gm/kg)       Fluid Need Method: RDA Method, other (see comments) (Per MD or 1 mL/kcal)        RDA Method (mL):                Assessment and Plan    Nutrition Problem  Inadequate energy intake     Related to (etiology):   Decreased appetite     Signs and Symptoms (as evidenced by):   % meals consumed; prealb 12     Interventions/Recommendations (treatment strategy):  See recs     Nutrition Diagnosis Status:   Improving       Monitor and Evaluation    Food and Nutrient Intake: energy intake, food and beverage intake  Food and Nutrient Adminstration: diet order     Physical Activity and Function: nutrition-related ADLs and IADLs  Anthropometric Measurements: weight, weight change, body mass index  Biochemical Data, Medical Tests and Procedures: gastrointestinal profile, electrolyte and renal panel, glucose/endocrine profile, lipid profile, inflammatory " profile  Nutrition-Focused Physical Findings: overall appearance    Nutrition Risk    Level of Risk: other (see comments) (F/u 1 x weekly)    Nutrition Follow-Up    RD Follow-up?: Yes

## 2017-09-19 NOTE — PROGRESS NOTES
Pt and wife AAAO.  Pt sitting on side of bed, eating breakfast.  Discussed the plan of discharging home tomorrow with them.  They both verbalized they are in agreement with this plan and have no questions at this time.

## 2017-09-19 NOTE — PLAN OF CARE
Problem: Physical Therapy Goal  Goal: Physical Therapy Goal  Goals to be met by: 10/9/17     Patient will increase functional independence with mobility by performin. Supine to sit with MInimal Assistance-  Met 2017  2. Sit to supine with MInimal Assistance - MET 17  3. Sit to stand transfer with Minimal Assistance- met       Updated: Sit to stand transfer with supervision using RW   4. Bed to chair transfer with Minimal Assistance-MET 17  5. Gait  x 50 feet with Minimal Assistance. -  Met 2017  6. Lower extremity exercise program x15 reps, with supervision, in order to increase LE strength and (I) with functional mobility. MET 17  7. Pt receive gait training ~ 220 ft with AD if needed and supervision- not met            Outcome: Ongoing (interventions implemented as appropriate)  Pt progressing well with functional mobility, continues to demonstrate decreased safety awareness/insight to disability and LVAD management. Continue current POC.     Tonja Vallecillo, PT, DPT   2017  Pager: [phone]

## 2017-09-19 NOTE — PT/OT/SLP PROGRESS
"Physical Therapy  Treatment    Suman Hayden   MRN: [id number]   Admitting Diagnosis: LVAD (left ventricular assist device) present    PT Received On: 09/19/17  PT Start Time: 1411     PT Stop Time: 1452    PT Total Time (min): 41 min       Billable Minutes:  Gait [id number] min, Therapeutic Activity 10 min and Therapeutic Exercise 10 min    Treatment Type: Treatment  PT/PTA: PT     PTA Visit Number: 0       General Precautions: Standard, fall, LVAD  Orthopedic Precautions: N/A   Braces: N/A    Do you have any cultural, spiritual, Mormonism conflicts, given your current situation?: none noted     Subjective:  Communicated with RN prior to session. Pt agreeable to participate in therapy session. Pt sitting up in chair upon PT arrival, wife not at bedside. Pt with LVAD batteries not in vest pockets, sitting on IV line, life vest line tangled underneath patient. Therapist asked pt why his batteries were out of the pockets and pt stated "what batteries?" Pt continues to demonstrate decreased safety awareness and poor insight with LVAD management. Pt required total A to untangle lines and manage LVAD. Pt's wife arrived to room during treatment session, assisting with chair follow during gait for safety.     Pain/Comfort  Pain Rating 1: 0/10  Pain Rating Post-Intervention 1: 0/10    Objective:   Patient found with: PICC line, telemetry (LVAD to battery power; life vest)    Functional Mobility:  Bed Mobility:   Supine to Sit: Supervision (supervision only for safety with line management )  Sit to Supine: Supervision (supervision only for safety with line management )    Transfers:  Sit <> Stand Assistance: Minimum Assistance, Contact Guard Assistance (CGA for 1st trial from bedside chair; min A for additional 3 trials from wheelchair )  Sit <> Stand Assistive Device: 4 wheeled walker  Bed <> Chair Technique: Stand Pivot  Bed <> Chair Assistance: Contact Guard Assistance  Bed <> Chair Assistive Device: 4 wheeled walker    Gait: "   Gait Distance: 200 ft. + 250 ft. + 80 ft. with seated rest break between trials; no LOB; Pt ranged from CGA to SBA during gait   Assistance 1: Stand by Assistance, Contact Guard Assistance  Gait Assistive Device: Rollator  Gait Pattern: reciprocal  Gait Deviation(s): decreased step length, decreased weight-shifting ability (narrow base of support)     Balance:   Static Sit: GOOD: Takes MODERATE challenges from all directions  Dynamic Sit: GOOD: Maintains balance through MODERATE excursions of active trunk movement  Static Stand: FAIR+: Takes MINIMAL challenges from all directions  Dynamic stand: FAIR: Needs CONTACT GUARD during gait     Therapeutic Activities and Exercises:  Therapeutic activities aimed to increase pt's independence, safety, and efficiency with bed mobility and functional transfers. See above for assistance levels.  Therapist facilitated practice of bed mobility and sit<>stand tranfers this visit to improve safety awareness with line management (LVAD lines and IV line) due to pt impulsivity.     Therapist facilitated progression of gait training to improve gait stability, endurance, and independence with functional ambulation.  Chair follow and emergency bag present throughout. Pt required CGA primarily at the beginning of gait trials, but progressed to SBA during gait. Pt with improvement in gait speed and stability, continues to demonstrate mild instability during turns and when changing direction/speed.     Therex for BLE strengthening:   - Pt performed seated LAQ, marches and ankle pumps x 15 reps. Demonstrated good recall of therex HEP, required verbal cueing and demonstration to reduce speed of therex.       AM-PAC 6 CLICK MOBILITY  How much help from another person does this patient currently need?   1 = Unable, Total/Dependent Assistance  2 = A lot, Maximum/Moderate Assistance  3 = A little, Minimum/Contact Guard/Supervision  4 = None, Modified [place]/Independent    Turning over in  bed (including adjusting bedclothes, sheets and blankets)?: 4  Sitting down on and standing up from a chair with arms (e.g., wheelchair, bedside commode, etc.): 3  Moving from lying on back to sitting on the side of the bed?: 4  Moving to and from a bed to a chair (including a wheelchair)?: 3  Need to walk in hospital room?: 3  Climbing 3-5 steps with a railing?: 2  Total Score: 19    AM-PAC Raw Score CMS G-Code Modifier Level of Impairment Assistance   6 % Total / Unable   7 - 9 CM 80 - 100% Maximal Assist   10 - 14 CL 60 - 80% Moderate Assist   15 - 19 CK 40 - 60% Moderate Assist   20 - 22 CJ 20 - 40% Minimal Assist   23 CI 1-20% SBA / CGA   24 CH 0% Independent/ Mod I     Patient left up in chair with all lines intact, call button in reach, RN notified and wife present.    Assessment:  Suman Hayden is a 67 y.o. male with a medical diagnosis of LVAD (left ventricular assist device) present. Pt demonstrates consistent improvement with transfers, endurance, and gait stability over the past several treatment sessions. However, pt continues to display impulsivity, impaired safety awareness and lack of insight to LVAD management, placing pt at continued risk for falls. Pt would benefit from continued PT intervention to address below listed deficits and maximize return to PLOF.       Rehab identified problem list/impairments: Rehab identified problem list/impairments: weakness, impaired endurance, gait instability, impaired balance, decreased safety awareness, impaired cardiopulmonary response to activity    Rehab potential is good.    Activity tolerance: Good    Discharge recommendations: Discharge Facility/Level Of Care Needs: home health OT, home health PT     Barriers to discharge: Barriers to Discharge: None    Equipment recommendations: Equipment Needed After Discharge: bedside commode, wheelchair, rollator     GOALS:    Physical Therapy Goals        Problem: Physical Therapy Goal    Goal Priority  Disciplines Outcome Goal Variances Interventions   Physical Therapy Goal     PT/OT, PT Ongoing (interventions implemented as appropriate)     Description:  Goals to be met by: 10/9/17     Patient will increase functional independence with mobility by performin. Supine to sit with MInimal Assistance-  Met 2017  2. Sit to supine with MInimal Assistance - MET 17  3. Sit to stand transfer with Minimal Assistance- met       Updated: Sit to stand transfer with supervision using RW   4. Bed to chair transfer with Minimal Assistance-MET 17  5. Gait  x 50 feet with Minimal Assistance. -  Met 2017  6. Lower extremity exercise program x15 reps, with supervision, in order to increase LE strength and (I) with functional mobility. MET 17  7. Pt receive gait training ~ 220 ft with AD if needed and supervision- not met                              PLAN:    Patient to be seen 6 x/week  to address the above listed problems via gait training, therapeutic activities, therapeutic exercises, neuromuscular re-education  Plan of Care expires: 10/09/17  Plan of Care reviewed with: patient, spouse        Tonja Vallecillo, PT, DPT   2017  Pager: [phone]

## 2017-09-19 NOTE — ASSESSMENT & PLAN NOTE
-HeartMate 3 Implanted 7/27/2017 as DT   -s/p delayed chest closure (7/28/17) and extubated (7/29/17), reintubated 8/9/17 and extubated 8/13/17  -Implanted by Dr. Downing  -Continue Coumadin, Goal INR 2.0-3.0.   -Antiplatelets : n/a  -LDH is stable overall today. Will continue to monitor daily.  -Speed set at 5200 rpm  -Interrogation notable for no events  -Not listed for OHTx

## 2017-09-19 NOTE — PLAN OF CARE
Problem: SLP Goal  Goal: SLP Goal  Short Term Goals:   1. Pt will complete 3-4 word mental manipulation tasks given 1 repetition with 80% acc min cues.   2. Pt will answer general information questions with 90% acc no cues.   3. Pt will name 10 items in a category in 60 seconds with no cues.   4. Pt will complete money/time management tasks with 90% acc min cues.  5. Pt will immediately recall 5+ number lists with 90% acc no repetitions.   6. Pt will follow multi-unit commands with 85% acc no repetitions.   Outcome: Ongoing (interventions implemented as appropriate)  Wknrql-Xtoxykbs-Julivvjly Evaluation completed. Pt presents with mild cognitive-linguistic deficits. Pt would benefit from continued ST s/p d/c.   MARIANO Up., CCC-SLP  Pager: [phone]  09/19/2017

## 2017-09-19 NOTE — PLAN OF CARE
Problem: Patient Care Overview  Goal: Plan of Care Review  Patient progressing toward all goals. Plan of care discussed with patient, no questions at this time. Patient ambulating independently, fall precautions in place. VS & LVAD numbers WNL.  infusing; pt to be d/c tomorrow; Will monitor.

## 2017-09-19 NOTE — SUBJECTIVE & OBJECTIVE
Interval History: patient feeling well today, excited to be able to discharge home     Continuous Infusions:   DOBUTamine 2.5 mcg/kg/min (09/19/17 0537)     Scheduled Meds:   amlodipine  10 mg Oral Daily    aspirin  81 mg Oral Daily    dronabinol  5 mg Oral BID    furosemide  40 mg Oral BID    hydrALAZINE  50 mg Oral Q8H    pantoprazole  40 mg Oral Daily    potassium chloride  40 mEq Oral BID    pravastatin  20 mg Oral QHS    sodium chloride 0.9%  10 mL Intravenous Q6H    warfarin  2 mg Oral Daily     PRN Meds:sodium chloride, albuterol sulfate, bisacodyl, ceFAZolin (ANCEF) IVPB, dextrose 50%, glucagon (human recombinant), hydrocodone-acetaminophen 5-325mg, insulin aspart, ondansetron, Flushing PICC Protocol **AND** sodium chloride 0.9% **AND** sodium chloride 0.9%    Review of patient's allergies indicates:  No Known Allergies  Objective:     Vital Signs (Most Recent):  Temp: 97.9 °F (36.6 °C) (09/19/17 0715)  Pulse: 81 (09/19/17 1100)  Resp: 18 (09/19/17 0715)  BP: (!) 82/0 (09/19/17 0730)  SpO2: 99 % (09/19/17 0715) Vital Signs (24h Range):  Temp:  [97.9 °F (36.6 °C)-98.9 °F (37.2 °C)] 97.9 °F (36.6 °C)  Pulse:  [64-82] 81  Resp:  [18-20] 18  SpO2:  [95 %-99 %] 99 %  BP: ()/(0-76) 82/0     Weight: 75.8 kg (167 lb 1.7 oz)  Body mass index is 25.41 kg/m².      Intake/Output Summary (Last 24 hours) at 09/19/17 1109  Last data filed at 09/19/17 0600   Gross per 24 hour   Intake             1734 ml   Output              500 ml   Net             1234 ml       Hemodynamic Parameters:           Physical Exam   Constitutional: He is oriented to person, place, and time. He appears well-developed and well-nourished.   HENT:   Head: Normocephalic and atraumatic.   Eyes: EOM are normal. Pupils are equal, round, and reactive to light.   Neck: Normal range of motion. Neck supple. JVD present.   Cardiovascular: Normal rate and regular rhythm.    No murmur heard.  VAD hum smooth   Pulmonary/Chest: Effort normal  and breath sounds normal.   Abdominal: Soft. Bowel sounds are normal. He exhibits no distension. There is no tenderness. There is no guarding.   Musculoskeletal: Normal range of motion. He exhibits no edema.   Neurological: He is alert and oriented to person, place, and time.   Skin: Skin is warm and dry.   Nursing note and vitals reviewed.      Significant Labs:  CBC:    Recent Labs  Lab 09/19/17  0415   WBC 7.11   RBC 2.81*   HGB 7.7*   HCT 24.1*      MCV 86   MCH 27.4   MCHC 32.0     BNP:    Recent Labs  Lab 09/18/17  0533   BNP 1,416*     CMP:    Recent Labs  Lab 09/18/17  0533 09/19/17  0415    66*   CALCIUM 8.2* 8.1*   ALBUMIN 2.3*  --    PROT 6.3  --     140   K 3.2* 3.5   CO2 29 28    105   BUN 16 17   CREATININE 1.1 1.0   ALKPHOS 86  --    ALT 11  --    AST 17  --    BILITOT 1.1*  --       Coagulation:     Recent Labs  Lab 09/19/17  0415   INR 2.7*     LDH:    Recent Labs  Lab 09/17/17  0600 09/18/17  0533 09/19/17  0415   * 239 249     Microbiology:  Microbiology Results (last 7 days)     ** No results found for the last 168 hours. **          I have reviewed all pertinent labs within the past 24 hours.    Estimated Creatinine Clearance: 69.4 mL/min (based on SCr of 1 mg/dL).    Diagnostic Results:  I have reviewed and interpreted all pertinent imaging results/findings within the past 24 hours.

## 2017-09-19 NOTE — PROGRESS NOTES
09/18/17 1915 09/18/17 1926   Vital Signs   /75 (!) 94/0   MAP (mmHg) 86 --    BP Location Left arm Left arm   BP Method Automatic Doppler   Patient Position Lying Lying   Dr. Negrete notified of above and orders received to give regular scheduled hydralazine orally early.  Given and will continue to monitor.

## 2017-09-19 NOTE — PROGRESS NOTES
Seen pt in hospital rm 312. Inventoried pt's equipment for discharge. All equipment is accounted for. Pt and caregiver verbalized understanding of maintenance and proper use of LVAD system. Will follow up with pt at first clinic visit.

## 2017-09-19 NOTE — PROGRESS NOTES
[facility]  Heart Transplant  Progress Note    Patient Name: Suman Hayden  MRN: [id number]  Admission Date: 7/18/2017  Hospital Length of Stay: 63 days  Attending Physician: Jin Franklin MD  Primary Care Provider: Joe Ernst MD  Principal Problem:LVAD (left ventricular assist device) present    Subjective:     Interval History: patient feeling well today, excited to be able to discharge home     Continuous Infusions:   DOBUTamine 2.5 mcg/kg/min (09/19/17 0537)     Scheduled Meds:   amlodipine  10 mg Oral Daily    aspirin  81 mg Oral Daily    dronabinol  5 mg Oral BID    furosemide  40 mg Oral BID    hydrALAZINE  50 mg Oral Q8H    pantoprazole  40 mg Oral Daily    potassium chloride  40 mEq Oral BID    pravastatin  20 mg Oral QHS    sodium chloride 0.9%  10 mL Intravenous Q6H    warfarin  2 mg Oral Daily     PRN Meds:sodium chloride, albuterol sulfate, bisacodyl, ceFAZolin (ANCEF) IVPB, dextrose 50%, glucagon (human recombinant), hydrocodone-acetaminophen 5-325mg, insulin aspart, ondansetron, Flushing PICC Protocol **AND** sodium chloride 0.9% **AND** sodium chloride 0.9%    Review of patient's allergies indicates:  No Known Allergies  Objective:     Vital Signs (Most Recent):  Temp: 97.9 °F (36.6 °C) (09/19/17 0715)  Pulse: 81 (09/19/17 1100)  Resp: 18 (09/19/17 0715)  BP: (!) 82/0 (09/19/17 0730)  SpO2: 99 % (09/19/17 0715) Vital Signs (24h Range):  Temp:  [97.9 °F (36.6 °C)-98.9 °F (37.2 °C)] 97.9 °F (36.6 °C)  Pulse:  [64-82] 81  Resp:  [18-20] 18  SpO2:  [95 %-99 %] 99 %  BP: ()/(0-76) 82/0     Weight: 75.8 kg (167 lb 1.7 oz)  Body mass index is 25.41 kg/m².      Intake/Output Summary (Last 24 hours) at 09/19/17 1109  Last data filed at 09/19/17 0600   Gross per 24 hour   Intake             1734 ml   Output              500 ml   Net             1234 ml       Hemodynamic Parameters:           Physical Exam   Constitutional: He is oriented to person, place, and time. He  appears well-developed and well-nourished.   HENT:   Head: Normocephalic and atraumatic.   Eyes: EOM are normal. Pupils are equal, round, and reactive to light.   Neck: Normal range of motion. Neck supple. JVD present.   Cardiovascular: Normal rate and regular rhythm.    No murmur heard.  VAD hum smooth   Pulmonary/Chest: Effort normal and breath sounds normal.   Abdominal: Soft. Bowel sounds are normal. He exhibits no distension. There is no tenderness. There is no guarding.   Musculoskeletal: Normal range of motion. He exhibits no edema.   Neurological: He is alert and oriented to person, place, and time.   Skin: Skin is warm and dry.   Nursing note and vitals reviewed.      Significant Labs:  CBC:    Recent Labs  Lab 09/19/17  0415   WBC 7.11   RBC 2.81*   HGB 7.7*   HCT 24.1*      MCV 86   MCH 27.4   MCHC 32.0     BNP:    Recent Labs  Lab 09/18/17  0533   BNP 1,416*     CMP:    Recent Labs  Lab 09/18/17  0533 09/19/17  0415    66*   CALCIUM 8.2* 8.1*   ALBUMIN 2.3*  --    PROT 6.3  --     140   K 3.2* 3.5   CO2 29 28    105   BUN 16 17   CREATININE 1.1 1.0   ALKPHOS 86  --    ALT 11  --    AST 17  --    BILITOT 1.1*  --       Coagulation:     Recent Labs  Lab 09/19/17  0415   INR 2.7*     LDH:    Recent Labs  Lab 09/17/17  0600 09/18/17  0533 09/19/17  0415   * 239 249     Microbiology:  Microbiology Results (last 7 days)     ** No results found for the last 168 hours. **          I have reviewed all pertinent labs within the past 24 hours.    Estimated Creatinine Clearance: 69.4 mL/min (based on SCr of 1 mg/dL).    Diagnostic Results:  I have reviewed and interpreted all pertinent imaging results/findings within the past 24 hours.    Assessment and Plan:     * LVAD (left ventricular assist device) present    -HeartMate 3 Implanted 7/27/2017 as DT   -s/p delayed chest closure (7/28/17) and extubated (7/29/17), reintubated 8/9/17 and extubated 8/13/17  -Implanted by   [place]  -Continue Coumadin, Goal INR 2.0-3.0.   -Antiplatelets : n/a  -LDH is stable overall today. Will continue to monitor daily.  -Speed set at 5200 rpm  -Interrogation notable for no events  -Not listed for OHTx           Stage III pressure ulcer of sacral region    - wound care        Bacteremia    - ESBL bacteremia. completed IV antibiotics   - blood cultures from 8/29 NGTD          Atrial fibrillation    -AC, will not resume Amiodarone at this time.  Telemetry has been stable and INR is sensitive         Hyperglycemia    -per primary team        Atrial tachycardia    - [id number] - 3  -Rec restarting Amio. AC per CTS        AICD discharge    - Appropriate in the setting of VT aggravated by underlying AT/AFL (earlier during the admission). Device reprogrammed to VVI 80 this admission  -EP planning revision after discharge, will need lifevest           Hepatitis B core antibody positive since 2012              V-tach    - Will hold on resuming Amiodarone; Patient's telemetry is stable and INR is so sensitive.          Hyperlipidemia    -continue pravastatin        Essential hypertension    -Patient is pulsatile  -MAP goal 60-80 mmHg  -Blood pressure controlled over the last 24 hours  -Antihypertensive medications include Amlodipine and Hydralazine  Will monitor trends              CHF (NYHA class IV, ACC/AHA stage D)    -NICM  -Last 2D Echo 9/1/2017: LVEF 10-15%, LVEDD 6.3 cm  -Current diuretic regimen: po lasix BID  -2g Na dietary restriction, 1500 mL fluid restriction, strict I/Os              MELISSA Long  Heart Transplant  [facility]

## 2017-09-19 NOTE — PROGRESS NOTES
Daily E and M and VAD Interrogation Note    Reason for Visit: LVAD placement   Patient is seen in follow up for management of:  [x] HeartMate III      Interval History:  [] Interval history unobtainable due to intubation.  The [x] implant 7/27/17  No Events overnight      Review of Systems:  Positive for increased appetite   Denies N/V, abd pain, CP or PEREIRA  All other systems reviewed and negative    Medications:  Current Facility-Administered Medications   Medication Dose Route Frequency Provider Last Rate Last Dose    0.9%  NaCl infusion (for blood administration)   Intravenous Q24H PRN MELISSA Wilson        albuterol nebulizer solution 2.5 mg  2.5 mg Nebulization Q4H PRN Shayne Espinoza MD        amlodipine tablet 10 mg  10 mg Oral Daily Geovanna Marques NP   10 mg at 09/19/17 0834    aspirin EC tablet 81 mg  81 mg Oral Daily Graciela Bautista MD   81 mg at 09/19/17 0834    bisacodyl suppository 10 mg  10 mg Rectal Daily PRN Shayne Espinoza MD   10 mg at 09/01/17 0912    cefazolin (ANCEF) 2 gram in dextrose 5% 50 mL IVPB (premix)  2 g Intravenous On Call Procedure Jose R Almonte MD        dextrose 50% injection 12.5 g  12.5 g Intravenous PRN Charbel Ritter MD   12.5 g at 08/30/17 0449    DOBUTamine 500mg in D5W 250mL infusion (premix)  2.5 mcg/kg/min (Dosing Weight) Intravenous Continuous MELISSA Wilson 6 mL/hr at 09/19/17 0537 2.5 mcg/kg/min at 09/19/17 0537    dronabinol capsule 5 mg  5 mg Oral BID Geovanna Marques NP   5 mg at 09/19/17 0834    furosemide tablet 40 mg  40 mg Oral BID MELISSA Wilson   40 mg at 09/19/17 0834    glucagon (human recombinant) injection 1 mg  1 mg Intramuscular PRN Charbel Ritter MD        hydrALAZINE tablet 50 mg  50 mg Oral Q8H Geovanna Marques NP   50 mg at 09/19/17 0536    hydrocodone-acetaminophen 5-325mg per tablet 1 tablet  1 tablet Oral Q6H PRN Nadia Lucia, NP   1 tablet at 09/06/17 1546    insulin aspart pen 0-5 Units   0-5 Units Subcutaneous Q4H PRN Charbel Ritter MD   2 Units at 08/10/17 0751    ondansetron injection 4 mg  4 mg Intravenous Q6H PRN Shayne Espinoza MD   4 mg at 09/08/17 0928    pantoprazole EC tablet 40 mg  40 mg Oral Daily Geovanna Marques NP   40 mg at 09/19/17 0834    potassium chloride SA CR tablet 40 mEq  40 mEq Oral BID MELISSA Wilson   40 mEq at 09/19/17 0834    pravastatin tablet 20 mg  20 mg Oral QHS Geovanna Marques NP   20 mg at 09/18/17 2057    sodium chloride 0.9% flush 10 mL  10 mL Intravenous Q6H Deejay Duff Jr., MD        And    sodium chloride 0.9% flush 10 mL  10 mL Intravenous PRN Deejay Duff Jr., MD        warfarin (COUMADIN) tablet 2 mg  2 mg Oral Daily Jin Franklin MD           Physical Examination:  Vital Signs:   Vitals:    09/19/17 1100   BP:    Pulse: 81   Resp:    Temp:      Cardiovascular:  [x] Regular rate and rhythm. VAD sounds smooth   [x]  No edema []  Edema present  [x]  Clear to auscultation  []  Rales to  []  Coarse  []  No rales but   [] Pedal Pulses absent  []  Pulses  + throughout  Skin:  Incision is [x]  Clean, dry and intact.    Sternum:  [x]  Stable []  Unstable  Driveline(s):   [x]  Clean, dry and intact.     Labs:  CBC, CMP, LDH and Coags     X-Rays:  [x]  I reviewed Chest x-ray    Procedure:  Device Interrogation including analysis of device parameters.  Current Settings   [x]  Ventricular Assist Device  []  Total Artificial Heart interrogated  Review of device function is [x]  Stable     TXP LVAD INTERROGATIONS 9/19/2017 9/19/2017 9/19/2017 9/19/2017 9/18/2017 9/18/2017 9/18/2017   Type HeartMate3 (No Data) HeartMate3 HeartMate3 HeartMate3 HeartMate3 HeartMate3   Flow 4.2 - 4.3 4.3 4.4 4.3 4.4   Speed 5200 - 5200 5200 5200 5250 5200   PI 3.6 - 3.6 3.8 3.7 3.5 3.6   Power ([place]) 3.6 - 3.5 3.6 3.5 3.6 3.5   LSL - - - - - - -   Pulsatility - - Pulse Pulse Pulse - -   Some recent data might be hidden       Assessment:  [x]   Primary Cardiomyopathy []  Congestive Heart Failure   []  Atrial Fibrillation []  Ventricular Tachycardia   []  Aftercare cardiac device [x]  Long term (current) use of anticoagulants   []  Ventilator-associated pneumonia []  Pneumonia viral, unspecified   []  Pneumonia, bacterial, unspecified []  Pneumonia, organism unspecified   []  Hemorrhage of GI tract, unspecified    []  Nosebleed []  Driveline infection   []  Infection VAD device []  New onset of          Plan:  [x]  Interval history obtained from HTS attending team member during rounding today  [x]  VAD/MATT teaching performed with patient  [x]  Mobilization / Physical Therapy ongoing  [x]  Anticoagulation [x]  Ongoing []  Held  -Cog eval   - Home Thursday     Total time spent was 35 minutes.  Of which more than 50 percent of the care dominated counseling and coordinating care with different team members. The VAD was interrogated and all parameters were WNL and no significant findings were found in the history. All these findings are documented in the note above.      Date of Service: 09/19/2017         Cardiac Surgery Attending E and M (VAD) Note along with VAD Interrogation    I have seen and examined the patient and agree with the findings above    I also reviewed the patients clinical course and:  [x]  Hemodynamic & Respiratory parameters  [x]  Laboratory Data  [x]  Radiological studies     VAD Interrogated [x]      VAD Function is normal. Changes made []  None [x]      Interrogation of Ventricular assist device was performed with physician analysis of device parameters and review of device function. I have personally reviewed the interrogation findings and agree with findings as stated.

## 2017-09-20 LAB
ALBUMIN SERPL BCP-MCNC: 2.3 G/DL
ALP SERPL-CCNC: 93 U/L
ALT SERPL W/O P-5'-P-CCNC: 10 U/L
ANION GAP SERPL CALC-SCNC: 7 MMOL/L
AST SERPL-CCNC: 20 U/L
BASOPHILS # BLD AUTO: 0.04 K/UL
BASOPHILS NFR BLD: 0.6 %
BILIRUB DIRECT SERPL-MCNC: 0.7 MG/DL
BILIRUB SERPL-MCNC: 1.1 MG/DL
BNP SERPL-MCNC: 1461 PG/ML
BUN SERPL-MCNC: 18 MG/DL
CALCIUM SERPL-MCNC: 8.4 MG/DL
CHLORIDE SERPL-SCNC: 105 MMOL/L
CO2 SERPL-SCNC: 27 MMOL/L
CREAT SERPL-MCNC: 1 MG/DL
CRP SERPL-MCNC: 6.6 MG/L
DIASTOLIC DYSFUNCTION: YES
DIFFERENTIAL METHOD: ABNORMAL
EOSINOPHIL # BLD AUTO: 0.2 K/UL
EOSINOPHIL NFR BLD: 2.8 %
ERYTHROCYTE [DISTWIDTH] IN BLOOD BY AUTOMATED COUNT: 17.2 %
EST. GFR  (AFRICAN AMERICAN): >60 ML/MIN/1.73 M^2
EST. GFR  (NON AFRICAN AMERICAN): >60 ML/MIN/1.73 M^2
ESTIMATED PA SYSTOLIC PRESSURE: 31.04
GLUCOSE SERPL-MCNC: 63 MG/DL
HCT VFR BLD AUTO: 23.6 %
HGB BLD-MCNC: 7.5 G/DL
INR PPP: 2.3
LDH SERPL L TO P-CCNC: 242 U/L
LYMPHOCYTES # BLD AUTO: 1.3 K/UL
LYMPHOCYTES NFR BLD: 20.1 %
MAGNESIUM SERPL-MCNC: 1.5 MG/DL
MCH RBC QN AUTO: 26.6 PG
MCHC RBC AUTO-ENTMCNC: 31.8 G/DL
MCV RBC AUTO: 84 FL
MITRAL VALVE MOBILITY: NORMAL
MONOCYTES # BLD AUTO: 0.7 K/UL
MONOCYTES NFR BLD: 11.5 %
NEUTROPHILS # BLD AUTO: 4.2 K/UL
NEUTROPHILS NFR BLD: 64.7 %
PHOSPHATE SERPL-MCNC: 3 MG/DL
PLATELET # BLD AUTO: 217 K/UL
PMV BLD AUTO: 9.9 FL
POTASSIUM SERPL-SCNC: 3.8 MMOL/L
PREALB SERPL-MCNC: 13 MG/DL
PROT SERPL-MCNC: 6.4 G/DL
PROTHROMBIN TIME: 23.2 SEC
RBC # BLD AUTO: 2.82 M/UL
RETIRED EF AND QEF - SEE NOTES: 10 (ref 55–65)
SODIUM SERPL-SCNC: 139 MMOL/L
TRICUSPID VALVE REGURGITATION: ABNORMAL
WBC # BLD AUTO: 6.42 K/UL

## 2017-09-20 PROCEDURE — 83880 ASSAY OF NATRIURETIC PEPTIDE: CPT

## 2017-09-20 PROCEDURE — 84134 ASSAY OF PREALBUMIN: CPT

## 2017-09-20 PROCEDURE — 85610 PROTHROMBIN TIME: CPT

## 2017-09-20 PROCEDURE — 25000003 PHARM REV CODE 250: Performed by: NURSE PRACTITIONER

## 2017-09-20 PROCEDURE — 83615 LACTATE (LD) (LDH) ENZYME: CPT

## 2017-09-20 PROCEDURE — 92507 TX SP LANG VOICE COMM INDIV: CPT

## 2017-09-20 PROCEDURE — 94799 UNLISTED PULMONARY SVC/PX: CPT

## 2017-09-20 PROCEDURE — 25000003 PHARM REV CODE 250: Performed by: INTERNAL MEDICINE

## 2017-09-20 PROCEDURE — 83735 ASSAY OF MAGNESIUM: CPT

## 2017-09-20 PROCEDURE — 93750 INTERROGATION VAD IN PERSON: CPT | Mod: ,,, | Performed by: NURSE PRACTITIONER

## 2017-09-20 PROCEDURE — 86140 C-REACTIVE PROTEIN: CPT

## 2017-09-20 PROCEDURE — 99233 SBSQ HOSP IP/OBS HIGH 50: CPT | Mod: ,,, | Performed by: INTERNAL MEDICINE

## 2017-09-20 PROCEDURE — 20600001 HC STEP DOWN PRIVATE ROOM

## 2017-09-20 PROCEDURE — 97116 GAIT TRAINING THERAPY: CPT

## 2017-09-20 PROCEDURE — 93306 TTE W/DOPPLER COMPLETE: CPT | Mod: 26,,, | Performed by: INTERNAL MEDICINE

## 2017-09-20 PROCEDURE — 63600175 PHARM REV CODE 636 W HCPCS: Performed by: PHYSICIAN ASSISTANT

## 2017-09-20 PROCEDURE — 80048 BASIC METABOLIC PNL TOTAL CA: CPT

## 2017-09-20 PROCEDURE — 80076 HEPATIC FUNCTION PANEL: CPT

## 2017-09-20 PROCEDURE — 97530 THERAPEUTIC ACTIVITIES: CPT

## 2017-09-20 PROCEDURE — A4216 STERILE WATER/SALINE, 10 ML: HCPCS | Performed by: INTERNAL MEDICINE

## 2017-09-20 PROCEDURE — 27000248 HC VAD-ADDITIONAL DAY

## 2017-09-20 PROCEDURE — 93306 TTE W/DOPPLER COMPLETE: CPT

## 2017-09-20 PROCEDURE — 25000003 PHARM REV CODE 250: Performed by: PHYSICIAN ASSISTANT

## 2017-09-20 PROCEDURE — 85025 COMPLETE CBC W/AUTO DIFF WBC: CPT

## 2017-09-20 PROCEDURE — 63600175 PHARM REV CODE 636 W HCPCS: Performed by: NURSE PRACTITIONER

## 2017-09-20 PROCEDURE — 84100 ASSAY OF PHOSPHORUS: CPT

## 2017-09-20 RX ORDER — FUROSEMIDE 80 MG/1
80 TABLET ORAL 2 TIMES DAILY
Status: DISCONTINUED | OUTPATIENT
Start: 2017-09-20 | End: 2017-09-22 | Stop reason: HOSPADM

## 2017-09-20 RX ORDER — DRONABINOL 5 MG/1
5 CAPSULE ORAL
Qty: 60 CAPSULE | Refills: 5 | Status: SHIPPED | OUTPATIENT
Start: 2017-09-20 | End: 2017-10-11 | Stop reason: SDUPTHER

## 2017-09-20 RX ORDER — HYDROCODONE BITARTRATE AND ACETAMINOPHEN 5; 325 MG/1; MG/1
1 TABLET ORAL EVERY 6 HOURS PRN
Qty: 28 TABLET | Refills: 0 | Status: SHIPPED | OUTPATIENT
Start: 2017-09-20 | End: 2017-01-01

## 2017-09-20 RX ORDER — AMLODIPINE BESYLATE 10 MG/1
10 TABLET ORAL DAILY
Qty: 30 TABLET | Refills: 11 | Status: SHIPPED | OUTPATIENT
Start: 2017-09-21 | End: 2017-10-11 | Stop reason: SDUPTHER

## 2017-09-20 RX ORDER — HYDRALAZINE HYDROCHLORIDE 25 MG/1
75 TABLET, FILM COATED ORAL EVERY 8 HOURS
Qty: 270 TABLET | Refills: 11 | Status: SHIPPED | OUTPATIENT
Start: 2017-09-20 | End: 2017-10-11 | Stop reason: SDUPTHER

## 2017-09-20 RX ORDER — PANTOPRAZOLE SODIUM 40 MG/1
40 TABLET, DELAYED RELEASE ORAL DAILY
Qty: 30 TABLET | Refills: 11 | Status: SHIPPED | OUTPATIENT
Start: 2017-09-21 | End: 2017-10-11 | Stop reason: SDUPTHER

## 2017-09-20 RX ORDER — POTASSIUM CHLORIDE 20 MEQ/1
40 TABLET, EXTENDED RELEASE ORAL 2 TIMES DAILY
Qty: 60 TABLET | Refills: 11 | Status: SHIPPED | OUTPATIENT
Start: 2017-09-20 | End: 2017-10-26 | Stop reason: SDUPTHER

## 2017-09-20 RX ORDER — FUROSEMIDE 40 MG/1
40 TABLET ORAL 2 TIMES DAILY
Qty: 60 TABLET | Refills: 3 | Status: SHIPPED | OUTPATIENT
Start: 2017-09-20 | End: 2017-09-22 | Stop reason: HOSPADM

## 2017-09-20 RX ORDER — DOBUTAMINE HYDROCHLORIDE 200 MG/100ML
2.5 INJECTION INTRAVENOUS CONTINUOUS
Status: ON HOLD
Start: 2017-09-20 | End: 2017-11-22

## 2017-09-20 RX ADMIN — HYDRALAZINE HYDROCHLORIDE 75 MG: 50 TABLET ORAL at 09:09

## 2017-09-20 RX ADMIN — FUROSEMIDE 40 MG: 40 TABLET ORAL at 09:09

## 2017-09-20 RX ADMIN — HYDRALAZINE HYDROCHLORIDE 75 MG: 50 TABLET ORAL at 01:09

## 2017-09-20 RX ADMIN — HYDRALAZINE HYDROCHLORIDE 50 MG: 50 TABLET ORAL at 05:09

## 2017-09-20 RX ADMIN — PANTOPRAZOLE SODIUM 40 MG: 40 TABLET, DELAYED RELEASE ORAL at 09:09

## 2017-09-20 RX ADMIN — FUROSEMIDE 80 MG: 80 TABLET ORAL at 06:09

## 2017-09-20 RX ADMIN — POTASSIUM CHLORIDE 40 MEQ: 1500 TABLET, EXTENDED RELEASE ORAL at 09:09

## 2017-09-20 RX ADMIN — AMLODIPINE BESYLATE 10 MG: 10 TABLET ORAL at 09:09

## 2017-09-20 RX ADMIN — MAGNESIUM SULFATE HEPTAHYDRATE 3 G: 500 INJECTION, SOLUTION INTRAMUSCULAR; INTRAVENOUS at 09:09

## 2017-09-20 RX ADMIN — ASPIRIN 81 MG: 81 TABLET, COATED ORAL at 09:09

## 2017-09-20 RX ADMIN — Medication 10 ML: at 05:09

## 2017-09-20 RX ADMIN — DRONABINOL 5 MG: 2.5 CAPSULE ORAL at 09:09

## 2017-09-20 RX ADMIN — PRAVASTATIN SODIUM 20 MG: 20 TABLET ORAL at 09:09

## 2017-09-20 RX ADMIN — Medication 10 ML: at 06:09

## 2017-09-20 RX ADMIN — WARFARIN SODIUM 2 MG: 2 TABLET ORAL at 04:09

## 2017-09-20 NOTE — PROGRESS NOTES
09/19/17 1905 09/19/17 1912   Vital Signs   /82 (!) 100/0   MAP (mmHg) 88 --    BP Location Left arm Left arm   BP Method Automatic Doppler   Patient Position Lying Lying   Dr. Galvez notified of above and orders received to give regular scheduled nightly dose of oral hydralazine early.  Will continue to monitor.

## 2017-09-20 NOTE — PROGRESS NOTES
[facility]  Heart Transplant  Progress Note    Patient Name: Suman Hayden  MRN: [id number]  Admission Date: 7/18/2017  Hospital Length of Stay: 64 days  Attending Physician: Jin Franklin MD  Primary Care Provider: Joe Ernst MD  Principal Problem:LVAD (left ventricular assist device) present    Subjective:     Interval History: Patient feeling well today, excited to be able to discharge home. His BP is still elevated. Will adjust meds. Will go home with lifevest.     Continuous Infusions:   DOBUTamine 2.5 mcg/kg/min (09/19/17 0537)     Scheduled Meds:   amlodipine  10 mg Oral Daily    aspirin  81 mg Oral Daily    dronabinol  5 mg Oral BID    furosemide  40 mg Oral BID    hydrALAZINE  75 mg Oral Q8H    magnesium sulfate IVPB  3 g Intravenous Once    pantoprazole  40 mg Oral Daily    potassium chloride  40 mEq Oral BID    pravastatin  20 mg Oral QHS    sodium chloride 0.9%  10 mL Intravenous Q6H    warfarin  2 mg Oral Daily     PRN Meds:sodium chloride, albuterol sulfate, bisacodyl, ceFAZolin (ANCEF) IVPB, dextrose 50%, glucagon (human recombinant), hydrocodone-acetaminophen 5-325mg, insulin aspart, ondansetron, Flushing PICC Protocol **AND** sodium chloride 0.9% **AND** sodium chloride 0.9%    Review of patient's allergies indicates:  No Known Allergies  Objective:     Vital Signs (Most Recent):  Temp: 98.1 °F (36.7 °C) (09/20/17 1258)  Pulse: 79 (09/20/17 1258)  Resp: 16 (09/20/17 1258)  BP: 104/69 (09/20/17 1258)  SpO2: 96 % (09/20/17 1258) Vital Signs (24h Range):  Temp:  [97.8 °F (36.6 °C)-98.6 °F (37 °C)] 98.1 °F (36.7 °C)  Pulse:  [78-93] 79  Resp:  [16-20] 16  SpO2:  [94 %-99 %] 96 %  BP: ()/(0-82) 104/69     Weight: 82 kg (180 lb 11.2 oz)  Body mass index is 27.48 kg/m².      Intake/Output Summary (Last 24 hours) at 09/20/17 1303  Last data filed at 09/20/17 1000   Gross per 24 hour   Intake          2035.13 ml   Output             1900 ml   Net           135.13  ml     Hemodynamic Parameters:    Physical Exam   Constitutional: He is oriented to person, place, and time. He appears well-developed and well-nourished.   HENT:   Head: Normocephalic and atraumatic.   Eyes: EOM are normal. Pupils are equal, round, and reactive to light.   Neck: Normal range of motion. Neck supple. JVD present.   Cardiovascular: Normal rate and regular rhythm.    No murmur heard.  VAD hum smooth   Pulmonary/Chest: Effort normal and breath sounds normal.   Abdominal: Soft. Bowel sounds are normal. He exhibits no distension. There is no tenderness. There is no guarding.   Musculoskeletal: Normal range of motion. He exhibits no edema.   Neurological: He is alert and oriented to person, place, and time.   Skin: Skin is warm and dry.   Nursing note and vitals reviewed.      Significant Labs:  CBC:    Recent Labs  Lab 09/20/17 0514   WBC 6.42   RBC 2.82*   HGB 7.5*   HCT 23.6*      MCV 84   MCH 26.6*   MCHC 31.8*     BNP:    Recent Labs  Lab 09/20/17 0514   BNP 1,461*     CMP:    Recent Labs  Lab 09/20/17 0514   GLU 63*   CALCIUM 8.4*   ALBUMIN 2.3*   PROT 6.4      K 3.8   CO2 27      BUN 18   CREATININE 1.0   ALKPHOS 93   ALT 10   AST 20   BILITOT 1.1*      Coagulation:     Recent Labs  Lab 09/20/17 0514   INR 2.3*     LDH:    Recent Labs  Lab 09/18/17  0533 09/19/17  0415 09/20/17  0514    249 242     Microbiology:  Microbiology Results (last 7 days)     ** No results found for the last 168 hours. **          I have reviewed all pertinent labs within the past 24 hours.    Estimated Creatinine Clearance: 69.4 mL/min (based on SCr of 1 mg/dL).    Diagnostic Results:  I have reviewed and interpreted all pertinent imaging results/findings within the past 24 hours.    Assessment and Plan:     * LVAD (left ventricular assist device) present    -HeartMate 3 Implanted 7/27/2017 as DT   -s/p delayed chest closure (7/28/17) and extubated (7/29/17), reintubated 8/9/17 and extubated  8/13/17  -Implanted by Dr. Downing  -Continue Coumadin, Goal INR 2.0-3.0.   -Antiplatelets : n/a  -LDH is stable overall today. Will continue to monitor daily.  -Speed set at 5200 rpm  -Interrogation notable for no events  -Not listed for OHTx           Stage III pressure ulcer of sacral region    - wound care        Bacteremia    - ESBL bacteremia. completed IV antibiotics   - blood cultures from 8/29 NGTD          Atrial fibrillation    -AC, will not resume Amiodarone at this time.  Telemetry has been stable and INR is sensitive         Hyperglycemia    -controlled        Atrial tachycardia    - [id number] - 3  -Rec restarting Amio. AC per CTS        AICD discharge    - Appropriate in the setting of VT aggravated by underlying AT/AFL (earlier during the admission). Device reprogrammed to VVI 80 this admission  -EP planning revision after discharge, now has lifevest. Patient will go home with lifevest, New ICD in future.           Hepatitis B core antibody positive since 2012              V-tach    - Will hold on resuming Amiodarone; Patient's telemetry is stable and INR is so sensitive.          Hyperlipidemia    -continue pravastatin        Essential hypertension    -Patient is pulsatile  -MAP goal 60-80 mmHg  -Blood pressure uncontrolled over the last 24 hours  -Antihypertensive medications include Amlodipine and Hydralazine  Will monitor trends. Increased Hydralazine today.              CHF (NYHA class IV, ACC/AHA stage D)    -NICM  -Last 2D Echo 9/1/2017: LVEF 10-15%, LVEDD 6.3 cm  -Current diuretic regimen: po lasix BID, will increase today as patient appears slightly volume up  -2g Na dietary restriction, 1500 mL fluid restriction, strict I/Os              Isabel Chen PA-C  Heart Transplant  [facility]

## 2017-09-20 NOTE — ASSESSMENT & PLAN NOTE
-NICM  -Last 2D Echo 9/1/2017: LVEF 10-15%, LVEDD 6.3 cm  -Current diuretic regimen: po lasix BID, will increase today as patient appears slightly volume up  -2g Na dietary restriction, 1500 mL fluid restriction, strict I/Os

## 2017-09-20 NOTE — PT/OT/SLP PROGRESS
"Speech Language Pathology  Treatment    Suman Hayden   MRN: [id number]   CTSU 312/CTSU 312 A    Admitting Diagnosis: LVAD (left ventricular assist device) present    Diet recommendations: Solid Diet Level: Regular  Liquid Diet Level: Thin universal aspiration precautions    SLP Treatment Date: 09/20/17  Speech Start Time: 1215     Speech Stop Time: 1239     Speech Total (min): 24 min       TREATMENT BILLABLE MINUTES:  Speech Therapy Individual 24    Has the patient been evaluated by SLP for swallowing? : Yes  Keep patient NPO?: No   General Precautions: Standard, fall, LVAD  Current Respiratory Status: nasal cannula       Subjective:  Pt pleasant and cooperative.          Objective:     Pt answered simple money management questions with <50% acc no cues and increasing to 70% acc given cues.   Pt completed word deduction tasks with 60% acc no cues and 100% acc given min cues.   Pt recalled functional information after a 5+ minute delay with 70% acc.   Pt answered general information questions with 70% acc no cues.   Pt followed multi-unit commands with 80% acc given 1 repetition.   Pt unable to name months given holidays. Pt reported, "I got away from all of that. We don't celebrate any holidays."     Assessment:  Suman Hayden is a 67 y.o. male with a medical diagnosis of LVAD (left ventricular assist device) present and presents with cognitive-linguistic impairments.    Discharge recommendations: Discharge Facility/Level Of Care Needs: home health PT, home health OT, home health speech therapy     Goals:    SLP Goals        Problem: SLP Goal    Goal Priority Disciplines Outcome   SLP Goal     SLP Ongoing (interventions implemented as appropriate)   Description:  Short Term Goals:   1. Pt will complete 3-4 word mental manipulation tasks given 1 repetition with 80% acc min cues.   2. Pt will answer general information questions with 90% acc no cues.   3. Pt will name 10 items in a category in 60 seconds with no cues.   4. " Pt will complete money/time management tasks with 90% acc min cues.  5. Pt will immediately recall 5+ number lists with 90% acc no repetitions.   6. Pt will follow multi-unit commands with 85% acc no repetitions.               Multidisciplinary Problems (Resolved)        Problem: SLP Goal    Goal Priority Disciplines Outcome   SLP Goal   (Resolved)     SLP Outcome(s) achieved   Description:  Speech Language Pathology Goals  Goals expected to be met by 8/4:  1. Pt will tolerate a dental soft diet and thin liquids without s/s of aspiration.                Problem: SLP Goal    Goal Priority Disciplines Outcome   SLP Goal   (Resolved)     SLP Outcome(s) achieved   Description:  Speech Language Pathology Goals  Goals expected to be met by 9/6   1. Pt will tolerate thin liquids with no overt s/s of aspiration.   2. Pt will tolerate dental soft diet with adequate a-p transit and no overt s/s of aspiration                            Plan:   Patient to be seen Therapy Frequency: 3 x/week   Plan of Care expires: 10/18/17  Plan of Care reviewed with: patient  SLP Follow-up?: Yes         SHERRI Yañez, CCC-SLP   Pager: [phone]  09/20/2017

## 2017-09-20 NOTE — PROGRESS NOTES
Daily E and M and VAD Interrogation Note    Reason for Visit: LVAD placement   Patient is seen in follow up for management of:  [x] HeartMate III      Interval History:  [] Interval history unobtainable due to intubation.  The [x] implant 7/27/17  No Events overnight      Review of Systems:  Positive for increased appetite   Denies N/V, abd pain, CP or PEREIRA  All other systems reviewed and negative    Medications:  Current Facility-Administered Medications   Medication Dose Route Frequency Provider Last Rate Last Dose    0.9%  NaCl infusion (for blood administration)   Intravenous Q24H PRN MELISSA Wilson        albuterol nebulizer solution 2.5 mg  2.5 mg Nebulization Q4H PRN Shayne Espinoza MD        amlodipine tablet 10 mg  10 mg Oral Daily Geovanna Marques NP   10 mg at 09/20/17 0903    aspirin EC tablet 81 mg  81 mg Oral Daily Graciela Bautista MD   81 mg at 09/20/17 0903    bisacodyl suppository 10 mg  10 mg Rectal Daily PRN Shayne Espinoza MD   10 mg at 09/01/17 0912    cefazolin (ANCEF) 2 gram in dextrose 5% 50 mL IVPB (premix)  2 g Intravenous On Call Procedure Jose R Almonte MD        dextrose 50% injection 12.5 g  12.5 g Intravenous PRN Charbel Ritter MD   12.5 g at 08/30/17 0449    DOBUTamine 500mg in D5W 250mL infusion (premix)  2.5 mcg/kg/min (Dosing Weight) Intravenous Continuous MELISSA Wilson 6 mL/hr at 09/19/17 0537 2.5 mcg/kg/min at 09/19/17 0537    dronabinol capsule 5 mg  5 mg Oral BID Geovanna Marques NP   5 mg at 09/20/17 0903    furosemide tablet 40 mg  40 mg Oral BID MELISSA Wilson   40 mg at 09/20/17 0903    glucagon (human recombinant) injection 1 mg  1 mg Intramuscular PRN Charbel Ritter MD        hydrALAZINE tablet 75 mg  75 mg Oral Q8H Isabel Chen PA-C   75 mg at 09/20/17 1307    hydrocodone-acetaminophen 5-325mg per tablet 1 tablet  1 tablet Oral Q6H PRN Nadia Lucia, NP   1 tablet at 09/06/17 1546    insulin aspart pen 0-5  Units  0-5 Units Subcutaneous Q4H PRN Charbel Ritter MD   2 Units at 08/10/17 0751    ondansetron injection 4 mg  4 mg Intravenous Q6H PRN Shayne Espinoza MD   4 mg at 09/08/17 0928    pantoprazole EC tablet 40 mg  40 mg Oral Daily Geovanna Marques NP   40 mg at 09/20/17 0903    potassium chloride SA CR tablet 40 mEq  40 mEq Oral BID MELISSA Wilson   40 mEq at 09/20/17 0903    pravastatin tablet 20 mg  20 mg Oral QHS Geovanna Marques NP   20 mg at 09/19/17 2047    sodium chloride 0.9% flush 10 mL  10 mL Intravenous Q6H Deejay Duff Jr., MD   10 mL at 09/20/17 0519    And    sodium chloride 0.9% flush 10 mL  10 mL Intravenous PRN Deejay Duff Jr., MD        warfarin (COUMADIN) tablet 2 mg  2 mg Oral Daily Jin Franklin MD   2 mg at 09/19/17 1720       Physical Examination:  Vital Signs:   Vitals:    09/20/17 1258   BP: 104/69   Pulse: 79   Resp: 16   Temp: 98.1 °F (36.7 °C)     Cardiovascular:  [x] Regular rate and rhythm. VAD sounds smooth   [x]  No edema []  Edema present  [x]  Clear to auscultation  []  Rales to  []  Coarse  []  No rales but   [] Pedal Pulses absent  []  Pulses  + throughout  Skin:  Incision is [x]  Clean, dry and intact.    Sternum:  [x]  Stable []  Unstable  Driveline(s):   [x]  Clean, dry and intact.     Labs:  CBC, CMP, LDH and Coags     X-Rays:  [x]  I reviewed Chest x-ray    Procedure:  Device Interrogation including analysis of device parameters.  Current Settings   [x]  Ventricular Assist Device  []  Total Artificial Heart interrogated  Review of device function is [x]  Stable     TXP LVAD INTERROGATIONS 9/20/2017 9/20/2017 9/20/2017 9/19/2017 9/19/2017 9/19/2017 9/19/2017   Type HeartMate3 HeartMate3 HeartMate3 HeartMate3 HeartMate3 HeartMate3 HeartMate3   Flow 4.3 4.3 4.2 4.0 4.1 4.4 4.4   Speed 5200 5200 5200 5200 5200 5200 5200   PI 3.7 3.5 3.6 3.7 3.8 3.4 3.6   Power ([place]) 3.7 3.5 3.6 3.5 3.5 3.6 3.6   LSL - - - - - - -   Pulsatility Pulse  Pulse Pulse Pulse - - -   Some recent data might be hidden       Assessment:  [x]  Primary Cardiomyopathy []  Congestive Heart Failure   []  Atrial Fibrillation []  Ventricular Tachycardia   []  Aftercare cardiac device [x]  Long term (current) use of anticoagulants   []  Ventilator-associated pneumonia []  Pneumonia viral, unspecified   []  Pneumonia, bacterial, unspecified []  Pneumonia, organism unspecified   []  Hemorrhage of GI tract, unspecified    []  Nosebleed []  Driveline infection   []  Infection VAD device []  New onset of          Plan:  [x]  Interval history obtained from HTS attending team member during rounding today  [x]  VAD/MATT teaching performed with patient  [x]  Mobilization / Physical Therapy ongoing  [x]  Anticoagulation [x]  Ongoing []  Held  - Home Thursday, doing well    Total time spent was 35 minutes.  Of which more than 50 percent of the care dominated counseling and coordinating care with different team members. The VAD was interrogated and all parameters were WNL and no significant findings were found in the history. All these findings are documented in the note above.      Date of Service: 09/20/2017

## 2017-09-20 NOTE — ASSESSMENT & PLAN NOTE
- Appropriate in the setting of VT aggravated by underlying AT/AFL (earlier during the admission). Device reprogrammed to VVI 80 this admission  -EP planning revision after discharge, now has lifevest. Patient will go home with lifevest, New ICD in future.

## 2017-09-20 NOTE — PLAN OF CARE
Problem: Patient Care Overview  Goal: Plan of Care Review  Outcome: Ongoing (interventions implemented as appropriate)  Pt remains free of falls and injury this shift, pt up with 1x assit. Vital signs stable; paced on monitor. Plan of care reviewed with patient, verbalized understanding. Pt had 2D echo done at bedside. All VAD 3's WDL. Plans for DC tomorrow. No signs of acute distress noted. Will continue to monitor.

## 2017-09-20 NOTE — ASSESSMENT & PLAN NOTE
-Patient is pulsatile  -MAP goal 60-80 mmHg  -Blood pressure uncontrolled over the last 24 hours  -Antihypertensive medications include Amlodipine and Hydralazine  Will monitor trends. Increased Hydralazine today.

## 2017-09-20 NOTE — PLAN OF CARE
Problem: SLP Goal  Goal: SLP Goal  Short Term Goals:   1. Pt will complete 3-4 word mental manipulation tasks given 1 repetition with 80% acc min cues.   2. Pt will answer general information questions with 90% acc no cues.   3. Pt will name 10 items in a category in 60 seconds with no cues.   4. Pt will complete money/time management tasks with 90% acc min cues.  5. Pt will immediately recall 5+ number lists with 90% acc no repetitions.   6. Pt will follow multi-unit commands with 85% acc no repetitions.    Outcome: Ongoing (interventions implemented as appropriate)  Pt progressing towards goals. Pt would benefit from continued ST s/p d/c.   IWONA Up, CCC-SLP  Pager: [phone]  09/20/2017

## 2017-09-20 NOTE — PLAN OF CARE
Problem: Physical Therapy Goal  Goal: Physical Therapy Goal  Goals to be met by: 10/9/17     Patient will increase functional independence with mobility by performin. Supine to sit with MInimal Assistance-  Met 2017  2. Sit to supine with MInimal Assistance - MET 17  3. Sit to stand transfer with Minimal Assistance- met       Updated: Sit to stand transfer with supervision using RW   4. Bed to chair transfer with Minimal Assistance-MET 17  5. Gait  x 50 feet with Minimal Assistance. -  Met 2017  6. Lower extremity exercise program x15 reps, with supervision, in order to increase LE strength and (I) with functional mobility. MET 17  7. Pt receive gait training ~ 220 ft with AD if needed and supervision- not met             Outcome: Ongoing (interventions implemented as appropriate)  See progress note for details on today's session. Continue current POC.     Tonja Vallecillo PT, DPT   2017  Pager: [phone]

## 2017-09-20 NOTE — PT/OT/SLP PROGRESS
Physical Therapy  Treatment    Suman Hayden   MRN: [id number]   Admitting Diagnosis: LVAD (left ventricular assist device) present    PT Received On: 09/20/17  PT Start Time: 1006     PT Stop Time: 1052    PT Total Time (min): 46 min       Billable Minutes:  Gait [id number] min and Therapeutic Activity 15 min    Treatment Type: Treatment  PT/PTA: PT     PTA Visit Number: 0       General Precautions: Standard, fall, LVAD  Orthopedic Precautions: N/A   Braces: N/A    Do you have any cultural, spiritual, Judaism conflicts, given your current situation?: none noted     Subjective:  Communicated with RN prior to session. Pt agreeable to participate in therapy session. Pt's wife in shower, pt resting in supine upon PT arrival. Pt with LVAD controller hanging off bed- therapist reinforced education on importance of safety with LVAD management and keeping LVAD drive line close to body to reduce risk of injury/infection.     Pain/Comfort  Pain Rating 1: 0/10  Pain Rating Post-Intervention 1: 0/10    Objective:   Patient found with: PICC line, telemetry (LVAD to wall power)    Functional Mobility:  Bed Mobility:   Supine to Sit: Supervision    Transfers:  Sit <> Stand Assistance: Contact Guard Assistance (from EOB x 1 trial; from wheelchair x 2 trials)  Sit <> Stand Assistive Device: 4 wheeled walker    Gait:   Gait Distance: 200 ft. + 300 ft. with seated rest break between trials; no LOB; SBA for safety   Assistance 1: Stand by Assistance  Gait Assistive Device: Rollator  Gait Pattern: reciprocal  Gait Deviation(s): decreased stride length, decreased step length, decreased hal (narrow base of support)    Balance:   Static Sit: GOOD: Takes MODERATE challenges from all directions  Dynamic Sit: GOOD: Maintains balance through MODERATE excursions of active trunk movement  Static Stand: FAIR+: Takes MINIMAL challenges from all directions  Dynamic stand: FAIR+: Needs CLOSE SUPERVISION during gait and is able to right self with  minor LOB     Therapeutic Activities and Exercises:  Therapeutic activities aimed to increase pt's independence, safety, and efficiency with LVAD management, bed mobility and functional transfers. See above for assistance levels.  Therapist facilitated pt transitioning from LVAD wall power to battery power and donning vest, untangling lines- pt required max A. Therapist reinforced education on safety with LVAD management.      Therapist facilitated progression of gait training to improve gait stability, endurance, and independence with functional ambulation.  Chair follow and emergency bag present throughout. Pt required 1 seated rest break between ambulation trials due to fatigue. Pt with continued improvement in gait speed and stability, continues to demonstrate mild instability during turns and when changing direction/speed.     AM-PAC 6 CLICK MOBILITY  How much help from another person does this patient currently need?   1 = Unable, Total/Dependent Assistance  2 = A lot, Maximum/Moderate Assistance  3 = A little, Minimum/Contact Guard/Supervision  4 = None, Modified [place]/Independent    Turning over in bed (including adjusting bedclothes, sheets and blankets)?: 4  Sitting down on and standing up from a chair with arms (e.g., wheelchair, bedside commode, etc.): 3  Moving from lying on back to sitting on the side of the bed?: 4  Moving to and from a bed to a chair (including a wheelchair)?: 3  Need to walk in hospital room?: 3  Climbing 3-5 steps with a railing?: 2  Total Score: 19    AM-PAC Raw Score CMS G-Code Modifier Level of Impairment Assistance   6 % Total / Unable   7 - 9 CM 80 - 100% Maximal Assist   10 - 14 CL 60 - 80% Moderate Assist   15 - 19 CK 40 - 60% Moderate Assist   20 - 22 CJ 20 - 40% Minimal Assist   23 CI 1-20% SBA / CGA   24 CH 0% Independent/ Mod I     Patient left up in chair with all lines intact, call button in reach, RN notified and wife present.    Assessment:  Suman  Catracho is a 67 y.o. male with a medical diagnosis of LVAD (left ventricular assist device) present. Pt continues to progress with functional mobility and endurance. However, Mr. Hayden continues to display decreased safety awareness during transfers and poor insight to LVAD management. Pt's wife serves as strong caregiver support system. Pt would benefit from continued PT intervention to address below listed deficits and maximize return to PLOF.       Rehab identified problem list/impairments: Rehab identified problem list/impairments: weakness, impaired endurance, impaired self care skills, impaired functional mobilty, impaired balance, decreased safety awareness, impaired cardiopulmonary response to activity    Rehab potential is good.    Activity tolerance: Good    Discharge recommendations: Discharge Facility/Level Of Care Needs: home health PT, home health OT, home health speech therapy     Barriers to discharge: Barriers to Discharge: None    Equipment recommendations: Equipment Needed After Discharge: bedside commode, wheelchair, rollator     GOALS:    Physical Therapy Goals        Problem: Physical Therapy Goal    Goal Priority Disciplines Outcome Goal Variances Interventions   Physical Therapy Goal     PT/OT, PT Ongoing (interventions implemented as appropriate)     Description:  Goals to be met by: 10/9/17     Patient will increase functional independence with mobility by performin. Supine to sit with MInimal Assistance-  Met 2017  2. Sit to supine with MInimal Assistance - MET 17  3. Sit to stand transfer with Minimal Assistance- met       Updated: Sit to stand transfer with supervision using RW   4. Bed to chair transfer with Minimal Assistance-MET 17  5. Gait  x 50 feet with Minimal Assistance. -  Met 2017  6. Lower extremity exercise program x15 reps, with supervision, in order to increase LE strength and (I) with functional mobility. MET 17  7. Pt receive gait training  ~ 220 ft with AD if needed and supervision- not met                              PLAN:    Patient to be seen 6 x/week  to address the above listed problems via gait training, therapeutic activities, therapeutic exercises, neuromuscular re-education  Plan of Care expires: 10/09/17  Plan of Care reviewed with: patient, spouse        Tonja Avel, PT, DPT   9/20/2017  Pager: [phone]

## 2017-09-20 NOTE — SUBJECTIVE & OBJECTIVE
Interval History: Patient feeling well today, excited to be able to discharge home. His BP is still elevated. Will adjust meds. Will go home with lifevest.     Continuous Infusions:   DOBUTamine 2.5 mcg/kg/min (09/19/17 0537)     Scheduled Meds:   amlodipine  10 mg Oral Daily    aspirin  81 mg Oral Daily    dronabinol  5 mg Oral BID    furosemide  40 mg Oral BID    hydrALAZINE  75 mg Oral Q8H    magnesium sulfate IVPB  3 g Intravenous Once    pantoprazole  40 mg Oral Daily    potassium chloride  40 mEq Oral BID    pravastatin  20 mg Oral QHS    sodium chloride 0.9%  10 mL Intravenous Q6H    warfarin  2 mg Oral Daily     PRN Meds:sodium chloride, albuterol sulfate, bisacodyl, ceFAZolin (ANCEF) IVPB, dextrose 50%, glucagon (human recombinant), hydrocodone-acetaminophen 5-325mg, insulin aspart, ondansetron, Flushing PICC Protocol **AND** sodium chloride 0.9% **AND** sodium chloride 0.9%    Review of patient's allergies indicates:  No Known Allergies  Objective:     Vital Signs (Most Recent):  Temp: 98.1 °F (36.7 °C) (09/20/17 1258)  Pulse: 79 (09/20/17 1258)  Resp: 16 (09/20/17 1258)  BP: 104/69 (09/20/17 1258)  SpO2: 96 % (09/20/17 1258) Vital Signs (24h Range):  Temp:  [97.8 °F (36.6 °C)-98.6 °F (37 °C)] 98.1 °F (36.7 °C)  Pulse:  [78-93] 79  Resp:  [16-20] 16  SpO2:  [94 %-99 %] 96 %  BP: ()/(0-82) 104/69     Weight: 82 kg (180 lb 11.2 oz)  Body mass index is 27.48 kg/m².      Intake/Output Summary (Last 24 hours) at 09/20/17 1303  Last data filed at 09/20/17 1000   Gross per 24 hour   Intake          2035.13 ml   Output             1900 ml   Net           135.13 ml     Hemodynamic Parameters:    Physical Exam   Constitutional: He is oriented to person, place, and time. He appears well-developed and well-nourished.   HENT:   Head: Normocephalic and atraumatic.   Eyes: EOM are normal. Pupils are equal, round, and reactive to light.   Neck: Normal range of motion. Neck supple. JVD present.    Cardiovascular: Normal rate and regular rhythm.    No murmur heard.  VAD hum smooth   Pulmonary/Chest: Effort normal and breath sounds normal.   Abdominal: Soft. Bowel sounds are normal. He exhibits no distension. There is no tenderness. There is no guarding.   Musculoskeletal: Normal range of motion. He exhibits no edema.   Neurological: He is alert and oriented to person, place, and time.   Skin: Skin is warm and dry.   Nursing note and vitals reviewed.      Significant Labs:  CBC:    Recent Labs  Lab 09/20/17 0514   WBC 6.42   RBC 2.82*   HGB 7.5*   HCT 23.6*      MCV 84   MCH 26.6*   MCHC 31.8*     BNP:    Recent Labs  Lab 09/20/17 0514   BNP 1,461*     CMP:    Recent Labs  Lab 09/20/17 0514   GLU 63*   CALCIUM 8.4*   ALBUMIN 2.3*   PROT 6.4      K 3.8   CO2 27      BUN 18   CREATININE 1.0   ALKPHOS 93   ALT 10   AST 20   BILITOT 1.1*      Coagulation:     Recent Labs  Lab 09/20/17 0514   INR 2.3*     LDH:    Recent Labs  Lab 09/18/17  0533 09/19/17  0415 09/20/17 0514    249 242     Microbiology:  Microbiology Results (last 7 days)     ** No results found for the last 168 hours. **          I have reviewed all pertinent labs within the past 24 hours.    Estimated Creatinine Clearance: 69.4 mL/min (based on SCr of 1 mg/dL).    Diagnostic Results:  I have reviewed and interpreted all pertinent imaging results/findings within the past 24 hours.

## 2017-09-20 NOTE — PLAN OF CARE
Problem: Patient Care Overview  Goal: Plan of Care Review  Outcome: Ongoing (interventions implemented as appropriate)  Patient cooperative and pleasant with routine care and procedures.  Reinforced fall precautions and patient verbalized understanding.  Resting quietly without complaints.  Will continue to monitor.

## 2017-09-20 NOTE — PT/OT/SLP PROGRESS
Occupational Therapy  Treatment    Suman Hayden   MRN: [id number]   Admitting Diagnosis: LVAD (left ventricular assist device) present    OT Date of Treatment: 09/20/17   OT Start Time: 1421  OT Stop Time: 1446  OT Total Time (min): 25 min    Billable Minutes:  Therapeutic Activity 25 minutes    General Precautions: Standard, LVAD, fall  Orthopedic Precautions: N/A  Braces: N/A    Subjective:  Communicated with RN prior to session.    Pain/Comfort  Pain Rating 1: 0/10  Location - Side 1: Left  Location 1: chest (following ambulation)  Pain Addressed 1: Reposition, Distraction, Nurse notified  Pain Rating Post-Intervention 1: 3/10    Objective:  Patient found with: PICC line, telemetry (LVAD, LifeVest)     Functional Mobility:  Bed Mobility: NT as pt up in chair and left up in chair     Transfers:  Sit <> Stand Assistance: Stand By Assistance- Contact Guard Assistance x 3 trials from bedside chair and W/C; requires max effort from pt and rocking several times to achieve upright stance with SBA  Sit <> Stand Assistive Device: 4 wheeled walker    Functional Ambulation: Within room and hallway with SBA using rollator; one seated rest break required    Activities of Daily Living:  Pt declined; already dressed and performed ADL routine this morning with assistance from wife    Balance:   Static Sit: NORMAL: No deviations seen in posture held statically  Dynamic Sit: GOOD+: Maintains balance through MAXIMAL excursions of active trunk motion  Static Stand: GOOD: Takes MODERATE challenges from all directions  Dynamic stand: GOOD: Needs SUPERVISION only during gait and able to self right with moderate     Therapeutic Activities and Exercises:  Pt up in W/C dressed and on battery power (LVAD vest noted to be inside out); performed T/F and functional mobility around unit with SBA using rollator; addressed outstanding questions/concerns regarding ADLs/LVAD management in prep for D/C tomorrow    AM-PAC 6 CLICK ADL   How much help  "from another person does this patient currently need?   1 = Unable, Total/Dependent Assistance  2 = A lot, Maximum/Moderate Assistance  3 = A little, Minimum/Contact Guard/Supervision  4 = None, Modified [place]/Independent    Putting on and taking off regular lower body clothing? : 2  Bathing (including washing, rinsing, drying)?: 2  Toileting, which includes using toilet, bedpan, or urinal? : 2  Putting on and taking off regular upper body clothing?: 3  Taking care of personal grooming such as brushing teeth?: 3  Eating meals?: 4  Total Score: 16     AM-PAC Raw Score CMS "G-Code Modifier Level of Impairment Assistance   6 % Total / Unable   7 - 8 CM 80 - 100% Maximal Assist   9-13 CL 60 - 80% Moderate Assist   14 - 19 CK 40 - 60% Moderate Assist   20 - 22 CJ 20 - 40% Minimal Assist   23 CI 1-20% SBA / CGA   24 CH 0% Independent/ Mod I       Patient left up in chair with all lines intact, call button in reach and wife present    ASSESSMENT:  Suman Hayden is a 67 y.o. male with a medical diagnosis of LVAD (left ventricular assist device) present and presents with good effort and participation in therapy. Pt continues to improve ADL and functional mobility independence and would benefit from continued OT. [facility] upon D/C.    Rehab identified problem list/impairments: Rehab identified problem list/impairments: weakness, impaired endurance, impaired self care skills, impaired functional mobilty, gait instability, decreased safety awareness, impaired cardiopulmonary response to activity    Rehab potential is good.    Activity tolerance: Good    Discharge recommendations: Discharge Facility/Level Of Care Needs: home with home health     Barriers to discharge: Barriers to Discharge: None    Equipment recommendations: bedside commode, wheelchair, rollator     GOALS:    Occupational Therapy Goals        Problem: Occupational Therapy Goal    Goal Priority Disciplines Outcome Interventions   Occupational " Therapy Goal     OT, PT/OT Ongoing (interventions implemented as appropriate)    Description:  Goals to be met by:  2 weeks 9/25/17    Patient will increase functional independence with ADLs by performing:  Feeding: Independent   UE Dressing with Supervision.  LE Dressing with Supervision.  Grooming while standing with Supervision.  Toileting from toilet with Supervision for hygiene and clothing management.   Stand pivot transfers with Supervision.  Toilet transfer to toilet with Supervision.  Pt will be supervision with LVAD yasmin't.     Goals to be met by:  2 weeks 9/12/17    Patient will increase functional independence with ADLs by performing:  Feeding: Independent   UE Dressing with Supervision.  LE Dressing with Supervision.  Grooming while standing with Supervision.  Toileting from toilet with Supervision for hygiene and clothing management.   Stand pivot transfers with Supervision.  Toilet transfer to toilet with Supervision.  Pt will be supervision  with LVAD yasmin't.     Goals to be met by:  2 weeks 8/28/17    Patient will increase functional independence with ADLs by performing:  Feeding: Independent   UE Dressing with Supervision.  LE Dressing with Supervision.  Grooming while standing with Supervision.  Toileting from toilet with Supervision for hygiene and clothing management.   Stand pivot transfers with Supervision.  Toilet transfer to toilet with Supervision.  Pt will be supervision  with LVAD yasmin't.                    Multidisciplinary Problems (Resolved)        Problem: Occupational Therapy Goal    Goal Priority Disciplines Outcome Interventions   Occupational Therapy Goal   (Resolved)     OT, PT/OT  Error    Description:  Goals to be met by: 8/04/2017    Patient will increase functional independence with ADLs by performing:    Increased functional strength to 5/5 for improved ADL performance.  Upper extremity exercise program and R LE ankle pumps  3x 10 reps per handout, with  independence.                      Plan:  Patient to be seen 5 x/week to address the above listed problems via self-care/home management, therapeutic exercises, therapeutic activities  Plan of Care expires: 09/21/17  Plan of Care reviewed with: patient, spouse         DELMY Mohamud  09/20/2017

## 2017-09-21 LAB
ANION GAP SERPL CALC-SCNC: 8 MMOL/L
APTT BLDCRRT: 30.8 SEC
BASOPHILS # BLD AUTO: 0.03 K/UL
BASOPHILS NFR BLD: 0.5 %
BUN SERPL-MCNC: 19 MG/DL
CALCIUM SERPL-MCNC: 8.3 MG/DL
CHLORIDE SERPL-SCNC: 105 MMOL/L
CHOLEST SERPL-MCNC: 104 MG/DL
CO2 SERPL-SCNC: 27 MMOL/L
CREAT SERPL-MCNC: 1.1 MG/DL
DIFFERENTIAL METHOD: ABNORMAL
EOSINOPHIL # BLD AUTO: 0.2 K/UL
EOSINOPHIL NFR BLD: 3.6 %
ERYTHROCYTE [DISTWIDTH] IN BLOOD BY AUTOMATED COUNT: 17.3 %
EST. GFR  (AFRICAN AMERICAN): >60 ML/MIN/1.73 M^2
EST. GFR  (NON AFRICAN AMERICAN): >60 ML/MIN/1.73 M^2
GLUCOSE SERPL-MCNC: 71 MG/DL
HCT VFR BLD AUTO: 22.9 %
HGB BLD-MCNC: 7.5 G/DL
INR PPP: 2.4
LDH SERPL L TO P-CCNC: 228 U/L
LYMPHOCYTES # BLD AUTO: 1.1 K/UL
LYMPHOCYTES NFR BLD: 18.2 %
MAGNESIUM SERPL-MCNC: 2 MG/DL
MCH RBC QN AUTO: 27.8 PG
MCHC RBC AUTO-ENTMCNC: 32.8 G/DL
MCV RBC AUTO: 85 FL
MONOCYTES # BLD AUTO: 0.7 K/UL
MONOCYTES NFR BLD: 11.7 %
NEUTROPHILS # BLD AUTO: 4 K/UL
NEUTROPHILS NFR BLD: 65.7 %
PHOSPHATE SERPL-MCNC: 3.3 MG/DL
PLATELET # BLD AUTO: 209 K/UL
PMV BLD AUTO: 9.6 FL
POTASSIUM SERPL-SCNC: 3.9 MMOL/L
PROTHROMBIN TIME: 24.4 SEC
RBC # BLD AUTO: 2.7 M/UL
SODIUM SERPL-SCNC: 140 MMOL/L
URATE SERPL-MCNC: 7.1 MG/DL
WBC # BLD AUTO: 6.14 K/UL

## 2017-09-21 PROCEDURE — 85610 PROTHROMBIN TIME: CPT

## 2017-09-21 PROCEDURE — 27000248 HC VAD-ADDITIONAL DAY

## 2017-09-21 PROCEDURE — 25000003 PHARM REV CODE 250: Performed by: INTERNAL MEDICINE

## 2017-09-21 PROCEDURE — 93750 INTERROGATION VAD IN PERSON: CPT | Performed by: INTERNAL MEDICINE

## 2017-09-21 PROCEDURE — 25000003 PHARM REV CODE 250: Performed by: PHYSICIAN ASSISTANT

## 2017-09-21 PROCEDURE — 99233 SBSQ HOSP IP/OBS HIGH 50: CPT | Mod: 25,,, | Performed by: INTERNAL MEDICINE

## 2017-09-21 PROCEDURE — 84100 ASSAY OF PHOSPHORUS: CPT

## 2017-09-21 PROCEDURE — 63600175 PHARM REV CODE 636 W HCPCS: Performed by: PHYSICIAN ASSISTANT

## 2017-09-21 PROCEDURE — 63600175 PHARM REV CODE 636 W HCPCS: Performed by: NURSE PRACTITIONER

## 2017-09-21 PROCEDURE — 85025 COMPLETE CBC W/AUTO DIFF WBC: CPT

## 2017-09-21 PROCEDURE — 83051 HEMOGLOBIN PLASMA: CPT

## 2017-09-21 PROCEDURE — 83735 ASSAY OF MAGNESIUM: CPT

## 2017-09-21 PROCEDURE — 25000003 PHARM REV CODE 250: Performed by: NURSE PRACTITIONER

## 2017-09-21 PROCEDURE — 80048 BASIC METABOLIC PNL TOTAL CA: CPT

## 2017-09-21 PROCEDURE — 93750 INTERROGATION VAD IN PERSON: CPT | Mod: ,,, | Performed by: INTERNAL MEDICINE

## 2017-09-21 PROCEDURE — 85730 THROMBOPLASTIN TIME PARTIAL: CPT

## 2017-09-21 PROCEDURE — A4216 STERILE WATER/SALINE, 10 ML: HCPCS | Performed by: INTERNAL MEDICINE

## 2017-09-21 PROCEDURE — 84550 ASSAY OF BLOOD/URIC ACID: CPT

## 2017-09-21 PROCEDURE — 20600001 HC STEP DOWN PRIVATE ROOM

## 2017-09-21 PROCEDURE — 83615 LACTATE (LD) (LDH) ENZYME: CPT

## 2017-09-21 PROCEDURE — 82465 ASSAY BLD/SERUM CHOLESTEROL: CPT

## 2017-09-21 RX ADMIN — Medication 10 ML: at 12:09

## 2017-09-21 RX ADMIN — FUROSEMIDE 80 MG: 80 TABLET ORAL at 05:09

## 2017-09-21 RX ADMIN — PRAVASTATIN SODIUM 20 MG: 20 TABLET ORAL at 09:09

## 2017-09-21 RX ADMIN — DOBUTAMINE IN DEXTROSE 2.5 MCG/KG/MIN: 200 INJECTION, SOLUTION INTRAVENOUS at 04:09

## 2017-09-21 RX ADMIN — HYDRALAZINE HYDROCHLORIDE 75 MG: 50 TABLET ORAL at 09:09

## 2017-09-21 RX ADMIN — DOBUTAMINE IN DEXTROSE 2.5 MCG/KG/MIN: 200 INJECTION, SOLUTION INTRAVENOUS at 03:09

## 2017-09-21 RX ADMIN — DRONABINOL 5 MG: 2.5 CAPSULE ORAL at 09:09

## 2017-09-21 RX ADMIN — Medication 10 ML: at 05:09

## 2017-09-21 RX ADMIN — WARFARIN SODIUM 2 MG: 2 TABLET ORAL at 05:09

## 2017-09-21 RX ADMIN — AMLODIPINE BESYLATE 10 MG: 10 TABLET ORAL at 09:09

## 2017-09-21 RX ADMIN — POTASSIUM CHLORIDE 40 MEQ: 1500 TABLET, EXTENDED RELEASE ORAL at 09:09

## 2017-09-21 RX ADMIN — HYDRALAZINE HYDROCHLORIDE 75 MG: 50 TABLET ORAL at 05:09

## 2017-09-21 RX ADMIN — FUROSEMIDE 80 MG: 80 TABLET ORAL at 09:09

## 2017-09-21 RX ADMIN — PANTOPRAZOLE SODIUM 40 MG: 40 TABLET, DELAYED RELEASE ORAL at 09:09

## 2017-09-21 RX ADMIN — ASPIRIN 81 MG: 81 TABLET, COATED ORAL at 09:09

## 2017-09-21 RX ADMIN — HYDRALAZINE HYDROCHLORIDE 75 MG: 50 TABLET ORAL at 03:09

## 2017-09-21 NOTE — PT/OT/SLP PROGRESS
Occupational Therapy      Suman Hayden  MRN: [id number]    Patient not seen today secondary to pt with D/C orders placed by MD but experienced fall this afternoon and now going for CT scan. Will follow-up as scheduled if pt's discharge is delayed.    DELMY Mohamud  9/21/2017

## 2017-09-21 NOTE — SUBJECTIVE & OBJECTIVE
Interval History: patient had a fall today, unsure if he hit his head     Continuous Infusions:   DOBUTamine 2.5 mcg/kg/min (09/21/17 0437)     Scheduled Meds:   amlodipine  10 mg Oral Daily    aspirin  81 mg Oral Daily    dronabinol  5 mg Oral BID    furosemide  80 mg Oral BID    hydrALAZINE  75 mg Oral Q8H    pantoprazole  40 mg Oral Daily    potassium chloride  40 mEq Oral BID    pravastatin  20 mg Oral QHS    sodium chloride 0.9%  10 mL Intravenous Q6H    warfarin  2 mg Oral Daily     PRN Meds:sodium chloride, albuterol sulfate, bisacodyl, ceFAZolin (ANCEF) IVPB, dextrose 50%, glucagon (human recombinant), hydrocodone-acetaminophen 5-325mg, insulin aspart, ondansetron, Flushing PICC Protocol **AND** sodium chloride 0.9% **AND** sodium chloride 0.9%    Review of patient's allergies indicates:  No Known Allergies  Objective:     Vital Signs (Most Recent):  Temp: 98.9 °F (37.2 °C) (09/21/17 0750)  Pulse: 79 (09/21/17 1320)  Resp: 18 (09/21/17 0750)  BP: 115/80 (09/21/17 1320)  SpO2: 98 % (09/21/17 0750) Vital Signs (24h Range):  Temp:  [98 °F (36.7 °C)-98.9 °F (37.2 °C)] 98.9 °F (37.2 °C)  Pulse:  [58-86] 79  Resp:  [16-18] 18  SpO2:  [94 %-98 %] 98 %  BP: ()/(0-80) 115/80     Weight: 82 kg (180 lb 11.2 oz)  Body mass index is 27.48 kg/m².      Intake/Output Summary (Last 24 hours) at 09/21/17 1415  Last data filed at 09/21/17 1200   Gross per 24 hour   Intake              546 ml   Output             1900 ml   Net            -1354 ml       Hemodynamic Parameters:           Physical Exam   Constitutional: He is oriented to person, place, and time. He appears well-developed and well-nourished.   HENT:   Head: Normocephalic and atraumatic.   Eyes: EOM are normal. Pupils are equal, round, and reactive to light.   Neck: Normal range of motion. Neck supple. JVD present.   Cardiovascular: Normal rate and regular rhythm.    No murmur heard.  VAD hum smooth   Pulmonary/Chest: Effort normal and breath sounds  normal.   Abdominal: Soft. Bowel sounds are normal. He exhibits no distension. There is no tenderness. There is no guarding.   Musculoskeletal: Normal range of motion. He exhibits no edema.   Neurological: He is alert and oriented to person, place, and time.   Skin: Skin is warm and dry.   Nursing note and vitals reviewed.      Significant Labs:  CBC:    Recent Labs  Lab 09/21/17 0526   WBC 6.14   RBC 2.70*   HGB 7.5*   HCT 22.9*      MCV 85   MCH 27.8   MCHC 32.8     BNP:    Recent Labs  Lab 09/20/17 0514   BNP 1,461*     CMP:    Recent Labs  Lab 09/20/17 0514 09/21/17 0526   GLU 63* 71   CALCIUM 8.4* 8.3*   ALBUMIN 2.3*  --    PROT 6.4  --     140   K 3.8 3.9   CO2 27 27    105   BUN 18 19   CREATININE 1.0 1.1   ALKPHOS 93  --    ALT 10  --    AST 20  --    BILITOT 1.1*  --       Coagulation:     Recent Labs  Lab 09/21/17 0526   INR 2.4*   APTT 30.8     LDH:    Recent Labs  Lab 09/19/17  0415 09/20/17  0514 09/21/17  0526    242 228     Microbiology:  Microbiology Results (last 7 days)     ** No results found for the last 168 hours. **          I have reviewed all pertinent labs within the past 24 hours.    Estimated Creatinine Clearance: 63 mL/min (based on SCr of 1.1 mg/dL).    Diagnostic Results:  I have reviewed and interpreted all pertinent imaging results/findings within the past 24 hours.

## 2017-09-21 NOTE — PT/OT/SLP PROGRESS
Physical Therapy      Suman Hayden  MRN: [id number]    Patient not seen today secondary to Other (Comment). Plan for pt to d/c today per VAD rounds. PT to pt's room in AM to ensure pt and wife prepared for d/c. Educated on HH PT, OT, SLP and DME recs. Pt and his wife verbalized agreement. MELISSA Gupta informed of d/c recs. Later in day pt had an unwitnessed fall. Pt is no longer discharging today; pt resting in bed following fall. Will follow-up pending d/c.    Kely Willett, PT, DPT   9/21/2017  [phone]

## 2017-09-21 NOTE — PLAN OF CARE
[facility]   Heart Transplant/VAD Clinic   [address] [place] [address]   [phone] after hours          [phone] fax     VAD HOME  HEALTH ORDERS      Admit to Home Health    Diagnosis:   Patient Active Problem List   Diagnosis    Nonischemic cardiomyopathy    CHF (NYHA class IV, ACC/AHA stage D)    Encounter for monitoring amiodarone therapy    Essential hypertension    Hyperlipidemia    V-tach    Elevated PSA    Hepatitis B core antibody positive since 2012    Chronic systolic congestive heart failure    Heart transplant candidate    Hyperbilirubinemia    AICD discharge    Syncope and collapse    Atrial tachycardia    Palliative care encounter    Hyperglycemia    AICD (automatic cardioverter/defibrillator) present    LVAD (left ventricular assist device) present    Atrial fibrillation    Heart replaced by heart assist device    Bacteremia    Stage III pressure ulcer of sacral region       Patient is homebound due to:  CHF    Diet: Low Fat, Low cholesterol, 2Gm Na, Coumadin restrictions.    Acitivities: No Swimming, bathing, vacuuming, contact sports.    Fresh implants= Sternal Precautions    Nursing:   SN to complete comprehensive assessment including routine vital signs. Instruct on disease process and s/s of complications to report to MD. Review/verify medication list sent home with the patient at time of discharge  and instruct patient/caregiver as needed. Frequency may be adjusted depending on start of care date.    **LVAD driveline exit site dressing change is to be completed per LVAD patient/caregiver only**.    Notify MD if SBP > 160 or < 90; DBP > 90 or < 50; HR > 120 or < 50; Temp > 101; Weight gain >3lbs in 1 day or 5lbs in 1 week.  Other:         LABS:  SN to perform labs: PT/INR per Coumadin clinic [phone].   Follow up INR date: 9/26/2017  No Finger Sticks      HOME INFUSION THERAPY:    SN to perform Infusion  Therapy/Central Line Care.  Review Central Line Care & Central Line Flush with patient.    Administer (drug and dose): dobutamine 2.5 mcg/kg/min IV dosing weight 74 kg      Central line care:  Scrub the Hub: Prior to accessing the line, always perform a 30 second alcohol scrub  Each lumen of the central line is to be flushed at least daily with 10 mL Normal Saline and 3 mL Heparin flush (100 units/mL)  Skilled Nurse (SN) may draw blood from IV access  Blood Draw Procedure:   - Aspirate at least 5 mL of blood   - Discard   - Obtain specimen   - Change posiflow cap   - Flush with 20 mL Normal Saline followed by a                 3-5 mL Heparin flush (100 units/mL)  Central :   - Sterile dressing changes are done weekly and as needed.   - Use chlor-hexadine scrub to cleanse site, apply Biopatch to insertion site,       apply securement device dressing   - Posi-flow caps are changed weekly and after EVERY lab draw.   - If sterile gauze is under dressing to control oozing,                 dressing change must be performed every 24 hours until gauze is not needed.    CONSULTS:     Physical Therapy to evaluate and treat. Evaluate for home safety and equipment needs; Establish/upgrade home exercise program. Perform / instruct on therapeutic exercises, gait training, transfer training, and Range of Motion.    Occupational Therapy to evaluate and treat. Evaluate home environment for safety and equipment needs. Perform/Instruct on transfers, ADL training, ROM, and therapeutic exercises.    Speech Therapy  to evaluate and treat for:  Language  Swallowing  Cognition                                                         S    Aide to provide assistance with personal care, ADLs, and vital signs    Send initial Home Health orders to HTS attending physician on call.  Send follow up questions to VAD clinic MD [phone] or [phone].

## 2017-09-21 NOTE — PROGRESS NOTES
Pt and wife AAAO.  They have appt slips for the next 3 weeks and verbalized they won't be a problem.  Reviewed how/when and who to call.  Both excited about going home. Reminded them to be very careful getting up and walking, not to fall.  Verbalized understanding and agreement.

## 2017-09-21 NOTE — PROGRESS NOTES
"[facility] called, spoke with Xiao.  Verbal education given regarding the VAD, including not to "peek" at the dressing, VS, and a possible faint pulse of 30 ppm.  All questions answered to her satisfaction as evidence by verbal acknowledgement.   "

## 2017-09-21 NOTE — PLAN OF CARE
Problem: Fall Risk (Adult)  Goal: Absence of Falls    9/21/17 @ 1320: RN notified that pt had fallen. RN arrived in room at 1320 pt sitting in bed with two other RN's in room and OT. Occupational therapist stated that they were walking down the hallways with another pt when she noticed mr Hayden laying on the ground. OT stated he was laying against the wall and that she was concerned he hit his head on the wall. Patient stated that he was walking and tripped over a cord. Pt stated he caught himself and slid down to the floor by holding the IV pole. Pt's IV pole was found tipped over. Pt stated he did not hit his head or have any pain. Frequent checks initiated (see epic). MD Darcy Gupta notified. CT ordered. Charge nurse Massiel notified and came to bedside.  Pt's vital signs stable, no visible signs of harm noted. Pt's discharged delayed for further evaluation.

## 2017-09-21 NOTE — PROCEDURES
TXP LVAD INTERROGATIONS 9/21/2017 9/21/2017 9/21/2017 9/21/2017 9/20/2017 9/20/2017 9/20/2017   Type HeartMate3 HeartMate3 HeartMate3 HeartMate3 HeartMate3 HeartMate II HeartMate3   Flow 4.3 4.3 4.2 4.3 4.2 4.5 -   Speed 5200 5200 5200 5200 5200 5200 -   PI 3.6 3.6 3.5 3.9 3.5 3.6 -   Power ([place]) 3.5 3.6 3.7 3.5 3.6 3.6 -   LSL - 4800 - - - - -   Pulsatility - - - - - - -   Some recent data might be hidden     Pt and wife AAAO  VAD sounds HM 3  HCT= 22.9  Modular cable CDI, no yellow visible.  Loosens and tightens easily.   Non pulsatile  No external power alarm in history noted 9/19/17 @ 2364.  Ms. Adam verbalized they did not disconnect both at the same time, but they were untangling.  No other alarms noted in history

## 2017-09-21 NOTE — PROGRESS NOTES
[facility]  Heart Transplant  Progress Note    Patient Name: Suman Hayden  MRN: [id number]  Admission Date: 7/18/2017  Hospital Length of Stay: 65 days  Attending Physician: Jin Franklin MD  Primary Care Provider: Joe Ernst MD  Principal Problem:LVAD (left ventricular assist device) present    Subjective:     Interval History: patient had a fall today, unsure if he hit his head     Continuous Infusions:   DOBUTamine 2.5 mcg/kg/min (09/21/17 0437)     Scheduled Meds:   amlodipine  10 mg Oral Daily    aspirin  81 mg Oral Daily    dronabinol  5 mg Oral BID    furosemide  80 mg Oral BID    hydrALAZINE  75 mg Oral Q8H    pantoprazole  40 mg Oral Daily    potassium chloride  40 mEq Oral BID    pravastatin  20 mg Oral QHS    sodium chloride 0.9%  10 mL Intravenous Q6H    warfarin  2 mg Oral Daily     PRN Meds:sodium chloride, albuterol sulfate, bisacodyl, ceFAZolin (ANCEF) IVPB, dextrose 50%, glucagon (human recombinant), hydrocodone-acetaminophen 5-325mg, insulin aspart, ondansetron, Flushing PICC Protocol **AND** sodium chloride 0.9% **AND** sodium chloride 0.9%    Review of patient's allergies indicates:  No Known Allergies  Objective:     Vital Signs (Most Recent):  Temp: 98.9 °F (37.2 °C) (09/21/17 0750)  Pulse: 79 (09/21/17 1320)  Resp: 18 (09/21/17 0750)  BP: 115/80 (09/21/17 1320)  SpO2: 98 % (09/21/17 0750) Vital Signs (24h Range):  Temp:  [98 °F (36.7 °C)-98.9 °F (37.2 °C)] 98.9 °F (37.2 °C)  Pulse:  [58-86] 79  Resp:  [16-18] 18  SpO2:  [94 %-98 %] 98 %  BP: ()/(0-80) 115/80     Weight: 82 kg (180 lb 11.2 oz)  Body mass index is 27.48 kg/m².      Intake/Output Summary (Last 24 hours) at 09/21/17 1415  Last data filed at 09/21/17 1200   Gross per 24 hour   Intake              546 ml   Output             1900 ml   Net            -1354 ml       Hemodynamic Parameters:           Physical Exam   Constitutional: He is oriented to person, place, and time. He appears  well-developed and well-nourished.   HENT:   Head: Normocephalic and atraumatic.   Eyes: EOM are normal. Pupils are equal, round, and reactive to light.   Neck: Normal range of motion. Neck supple. JVD present.   Cardiovascular: Normal rate and regular rhythm.    No murmur heard.  VAD hum smooth   Pulmonary/Chest: Effort normal and breath sounds normal.   Abdominal: Soft. Bowel sounds are normal. He exhibits no distension. There is no tenderness. There is no guarding.   Musculoskeletal: Normal range of motion. He exhibits no edema.   Neurological: He is alert and oriented to person, place, and time.   Skin: Skin is warm and dry.   Nursing note and vitals reviewed.      Significant Labs:  CBC:    Recent Labs  Lab 09/21/17  0526   WBC 6.14   RBC 2.70*   HGB 7.5*   HCT 22.9*      MCV 85   MCH 27.8   MCHC 32.8     BNP:    Recent Labs  Lab 09/20/17  0514   BNP 1,461*     CMP:    Recent Labs  Lab 09/20/17  0514 09/21/17  0526   GLU 63* 71   CALCIUM 8.4* 8.3*   ALBUMIN 2.3*  --    PROT 6.4  --     140   K 3.8 3.9   CO2 27 27    105   BUN 18 19   CREATININE 1.0 1.1   ALKPHOS 93  --    ALT 10  --    AST 20  --    BILITOT 1.1*  --       Coagulation:     Recent Labs  Lab 09/21/17  0526   INR 2.4*   APTT 30.8     LDH:    Recent Labs  Lab 09/19/17  0415 09/20/17  0514 09/21/17  0526    242 228     Microbiology:  Microbiology Results (last 7 days)     ** No results found for the last 168 hours. **          I have reviewed all pertinent labs within the past 24 hours.    Estimated Creatinine Clearance: 63 mL/min (based on SCr of 1.1 mg/dL).    Diagnostic Results:  I have reviewed and interpreted all pertinent imaging results/findings within the past 24 hours.    Assessment and Plan:     * LVAD (left ventricular assist device) present    -HeartMate 3 Implanted 7/27/2017 as DT   -s/p delayed chest closure (7/28/17) and extubated (7/29/17), reintubated 8/9/17 and extubated 8/13/17  -Implanted by   [place]  -Continue Coumadin, Goal INR 2.0-3.0.   -Antiplatelets : n/a  -LDH is stable overall today. Will continue to monitor daily.  -Speed set at 5200 rpm  -Interrogation notable for no events  -Not listed for OHTx           Stage III pressure ulcer of sacral region    - wound care        Bacteremia    - ESBL bacteremia. completed IV antibiotics   - blood cultures from 8/29 NGTD          Atrial fibrillation    -AC, will not resume Amiodarone at this time.  Telemetry has been stable and INR is sensitive         Hyperglycemia    -controlled        Atrial tachycardia    - [id number] - 3  -Rec restarting Amio. AC per CTS        AICD discharge    - Appropriate in the setting of VT aggravated by underlying AT/AFL (earlier during the admission). Device reprogrammed to VVI 80 this admission  -EP planning revision after discharge, now has lifevest. Patient will go home with lifevest, New ICD in future.           Hepatitis B core antibody positive since 2012              V-tach    - Will hold on resuming Amiodarone; Patient's telemetry is stable and INR is so sensitive.          Hyperlipidemia    -continue pravastatin        Essential hypertension    -Patient is pulsatile  -MAP goal 60-80 mmHg  -Blood pressure controlled over the last 24 hours  -Antihypertensive medications include Amlodipine and Hydralazine  Will monitor trends.               CHF (NYHA class IV, ACC/AHA stage D)    -NICM  -Last 2D Echo 9/1/2017: LVEF 10-15%, LVEDD 6.3 cm  -Current diuretic regimen: po lasix BID  -2g Na dietary restriction, 1500 mL fluid restriction, strict I/Os              MELISSA Long  Heart Transplant  [facility]

## 2017-09-22 ENCOUNTER — ANTI-COAG VISIT (OUTPATIENT)
Dept: CARDIOLOGY | Facility: CLINIC | Age: 67
End: 2017-09-22

## 2017-09-22 VITALS
WEIGHT: 172.38 LBS | HEART RATE: 77 BPM | HEIGHT: 68 IN | TEMPERATURE: 98 F | BODY MASS INDEX: 26.13 KG/M2 | OXYGEN SATURATION: 98 % | SYSTOLIC BLOOD PRESSURE: 80 MMHG | RESPIRATION RATE: 16 BRPM

## 2017-09-22 DIAGNOSIS — Z79.01 LONG TERM (CURRENT) USE OF ANTICOAGULANTS: ICD-10-CM

## 2017-09-22 DIAGNOSIS — Z95.811 LVAD (LEFT VENTRICULAR ASSIST DEVICE) PRESENT: ICD-10-CM

## 2017-09-22 LAB
ALBUMIN SERPL BCP-MCNC: 2.4 G/DL
ALP SERPL-CCNC: 84 U/L
ALT SERPL W/O P-5'-P-CCNC: 10 U/L
ANION GAP SERPL CALC-SCNC: 11 MMOL/L
AST SERPL-CCNC: 18 U/L
BASOPHILS # BLD AUTO: 0.03 K/UL
BASOPHILS NFR BLD: 0.5 %
BILIRUB DIRECT SERPL-MCNC: 0.6 MG/DL
BILIRUB SERPL-MCNC: 1.1 MG/DL
BNP SERPL-MCNC: 1815 PG/ML
BUN SERPL-MCNC: 18 MG/DL
CALCIUM SERPL-MCNC: 8.5 MG/DL
CHLORIDE SERPL-SCNC: 103 MMOL/L
CO2 SERPL-SCNC: 26 MMOL/L
CREAT SERPL-MCNC: 1.1 MG/DL
CRP SERPL-MCNC: 5.4 MG/L
DIFFERENTIAL METHOD: ABNORMAL
EOSINOPHIL # BLD AUTO: 0.2 K/UL
EOSINOPHIL NFR BLD: 2.6 %
ERYTHROCYTE [DISTWIDTH] IN BLOOD BY AUTOMATED COUNT: 17.4 %
EST. GFR  (AFRICAN AMERICAN): >60 ML/MIN/1.73 M^2
EST. GFR  (NON AFRICAN AMERICAN): >60 ML/MIN/1.73 M^2
GLUCOSE SERPL-MCNC: 61 MG/DL
HCT VFR BLD AUTO: 22.4 %
HGB BLD-MCNC: 7.2 G/DL
INR PPP: 2.5
LDH SERPL L TO P-CCNC: 245 U/L
LYMPHOCYTES # BLD AUTO: 1.3 K/UL
LYMPHOCYTES NFR BLD: 21.4 %
MAGNESIUM SERPL-MCNC: 1.6 MG/DL
MCH RBC QN AUTO: 27.3 PG
MCHC RBC AUTO-ENTMCNC: 32.1 G/DL
MCV RBC AUTO: 85 FL
MONOCYTES # BLD AUTO: 0.7 K/UL
MONOCYTES NFR BLD: 11.2 %
NEUTROPHILS # BLD AUTO: 4 K/UL
NEUTROPHILS NFR BLD: 64 %
PHOSPHATE SERPL-MCNC: 3.2 MG/DL
PLATELET # BLD AUTO: 199 K/UL
PMV BLD AUTO: 9.4 FL
POTASSIUM SERPL-SCNC: 3.7 MMOL/L
PREALB SERPL-MCNC: 14 MG/DL
PROT SERPL-MCNC: 6.4 G/DL
PROTHROMBIN TIME: 25.3 SEC
RBC # BLD AUTO: 2.64 M/UL
SODIUM SERPL-SCNC: 140 MMOL/L
WBC # BLD AUTO: 6.18 K/UL

## 2017-09-22 PROCEDURE — 25000003 PHARM REV CODE 250: Performed by: INTERNAL MEDICINE

## 2017-09-22 PROCEDURE — 83880 ASSAY OF NATRIURETIC PEPTIDE: CPT

## 2017-09-22 PROCEDURE — 85025 COMPLETE CBC W/AUTO DIFF WBC: CPT

## 2017-09-22 PROCEDURE — 83735 ASSAY OF MAGNESIUM: CPT

## 2017-09-22 PROCEDURE — 25000003 PHARM REV CODE 250: Performed by: PHYSICIAN ASSISTANT

## 2017-09-22 PROCEDURE — A4216 STERILE WATER/SALINE, 10 ML: HCPCS | Performed by: INTERNAL MEDICINE

## 2017-09-22 PROCEDURE — 80048 BASIC METABOLIC PNL TOTAL CA: CPT

## 2017-09-22 PROCEDURE — 83615 LACTATE (LD) (LDH) ENZYME: CPT

## 2017-09-22 PROCEDURE — 80076 HEPATIC FUNCTION PANEL: CPT

## 2017-09-22 PROCEDURE — 25000003 PHARM REV CODE 250: Performed by: NURSE PRACTITIONER

## 2017-09-22 PROCEDURE — 85610 PROTHROMBIN TIME: CPT

## 2017-09-22 PROCEDURE — 93750 INTERROGATION VAD IN PERSON: CPT | Mod: ,,, | Performed by: INTERNAL MEDICINE

## 2017-09-22 PROCEDURE — 97535 SELF CARE MNGMENT TRAINING: CPT

## 2017-09-22 PROCEDURE — 63600175 PHARM REV CODE 636 W HCPCS: Performed by: NURSE PRACTITIONER

## 2017-09-22 PROCEDURE — 84100 ASSAY OF PHOSPHORUS: CPT

## 2017-09-22 PROCEDURE — 86140 C-REACTIVE PROTEIN: CPT

## 2017-09-22 PROCEDURE — 27000248 HC VAD-ADDITIONAL DAY

## 2017-09-22 PROCEDURE — 92507 TX SP LANG VOICE COMM INDIV: CPT

## 2017-09-22 PROCEDURE — 93750 INTERROGATION VAD IN PERSON: CPT | Performed by: INTERNAL MEDICINE

## 2017-09-22 PROCEDURE — 99233 SBSQ HOSP IP/OBS HIGH 50: CPT | Mod: ,,, | Performed by: INTERNAL MEDICINE

## 2017-09-22 PROCEDURE — 84134 ASSAY OF PREALBUMIN: CPT

## 2017-09-22 RX ORDER — FERROUS SULFATE 324(65)MG
325 TABLET, DELAYED RELEASE (ENTERIC COATED) ORAL DAILY
Qty: 30 TABLET | Refills: 11 | Status: SHIPPED | OUTPATIENT
Start: 2017-09-22 | End: 2017-10-26 | Stop reason: SDUPTHER

## 2017-09-22 RX ORDER — WARFARIN 2 MG/1
2 TABLET ORAL DAILY
Qty: 30 TABLET | Refills: 11 | Status: SHIPPED | OUTPATIENT
Start: 2017-09-22 | End: 2017-10-26 | Stop reason: SDUPTHER

## 2017-09-22 RX ORDER — FUROSEMIDE 80 MG/1
80 TABLET ORAL 2 TIMES DAILY
Qty: 60 TABLET | Refills: 11 | Status: ON HOLD | OUTPATIENT
Start: 2017-09-22 | End: 2017-01-01 | Stop reason: ALTCHOICE

## 2017-09-22 RX ADMIN — Medication 10 ML: at 06:09

## 2017-09-22 RX ADMIN — POTASSIUM CHLORIDE 40 MEQ: 1500 TABLET, EXTENDED RELEASE ORAL at 08:09

## 2017-09-22 RX ADMIN — AMLODIPINE BESYLATE 10 MG: 10 TABLET ORAL at 08:09

## 2017-09-22 RX ADMIN — HYDRALAZINE HYDROCHLORIDE 75 MG: 50 TABLET ORAL at 05:09

## 2017-09-22 RX ADMIN — ASPIRIN 81 MG: 81 TABLET, COATED ORAL at 08:09

## 2017-09-22 RX ADMIN — PANTOPRAZOLE SODIUM 40 MG: 40 TABLET, DELAYED RELEASE ORAL at 08:09

## 2017-09-22 RX ADMIN — FUROSEMIDE 80 MG: 80 TABLET ORAL at 08:09

## 2017-09-22 RX ADMIN — DRONABINOL 5 MG: 2.5 CAPSULE ORAL at 08:09

## 2017-09-22 NOTE — PROGRESS NOTES
[facility]  Heart Transplant  Progress Note    Patient Name: Suman Hayden  MRN: [id number]  Admission Date: 7/18/2017  Hospital Length of Stay: 66 days  Attending Physician: No att. providers found  Primary Care Provider: Joe Ernst MD  Principal Problem:LVAD (left ventricular assist device) present    Subjective:     Interval History: patient ready to go home    Continuous Infusions:   DOBUTamine 2.5 mcg/kg/min (09/21/17 1501)     Scheduled Meds:   amlodipine  10 mg Oral Daily    aspirin  81 mg Oral Daily    dronabinol  5 mg Oral BID    furosemide  80 mg Oral BID    hydrALAZINE  75 mg Oral Q8H    pantoprazole  40 mg Oral Daily    potassium chloride  40 mEq Oral BID    pravastatin  20 mg Oral QHS    sodium chloride 0.9%  10 mL Intravenous Q6H    warfarin  2 mg Oral Daily     PRN Meds:sodium chloride, albuterol sulfate, bisacodyl, ceFAZolin (ANCEF) IVPB, dextrose 50%, glucagon (human recombinant), hydrocodone-acetaminophen 5-325mg, insulin aspart, ondansetron, Flushing PICC Protocol **AND** sodium chloride 0.9% **AND** sodium chloride 0.9%    Review of patient's allergies indicates:  No Known Allergies  Objective:     Vital Signs (Most Recent):  Temp: 98.4 °F (36.9 °C) (09/22/17 0800)  Pulse: 77 (09/22/17 1100)  Resp: 16 (09/22/17 0800)  BP: (!) 80/0 (09/22/17 0814)  SpO2: 98 % (09/22/17 0800) Vital Signs (24h Range):  Temp:  [98.3 °F (36.8 °C)-98.5 °F (36.9 °C)] 98.4 °F (36.9 °C)  Pulse:  [53-92] 77  Resp:  [16-18] 16  SpO2:  [92 %-98 %] 98 %  BP: ()/(0-84) 80/0     Weight: 78.2 kg (172 lb 6.4 oz)  Body mass index is 26.21 kg/m².      Intake/Output Summary (Last 24 hours) at 09/22/17 1415  Last data filed at 09/22/17 0800   Gross per 24 hour   Intake              408 ml   Output             1550 ml   Net            -1142 ml       Hemodynamic Parameters:         Physical Exam   Constitutional: He is oriented to person, place, and time. He appears well-developed and  well-nourished.   HENT:   Head: Normocephalic and atraumatic.   Eyes: EOM are normal. Pupils are equal, round, and reactive to light.   Neck: Normal range of motion. Neck supple. JVD present.   Cardiovascular: Normal rate and regular rhythm.    No murmur heard.  VAD hum smooth   Pulmonary/Chest: Effort normal and breath sounds normal.   Abdominal: Soft. Bowel sounds are normal. He exhibits no distension. There is no tenderness. There is no guarding.   Musculoskeletal: Normal range of motion. He exhibits no edema.   Neurological: He is alert and oriented to person, place, and time.   Skin: Skin is warm and dry.   Nursing note and vitals reviewed.      Significant Labs:  CBC:    Recent Labs  Lab 09/22/17  0515   WBC 6.18   RBC 2.64*   HGB 7.2*   HCT 22.4*      MCV 85   MCH 27.3   MCHC 32.1     BNP:    Recent Labs  Lab 09/22/17  0515   BNP 1,815*     CMP:    Recent Labs  Lab 09/22/17  0515   GLU 61*   CALCIUM 8.5*   ALBUMIN 2.4*   PROT 6.4      K 3.7   CO2 26      BUN 18   CREATININE 1.1   ALKPHOS 84   ALT 10   AST 18   BILITOT 1.1*      Coagulation:     Recent Labs  Lab 09/21/17  0526 09/22/17  0515   INR 2.4* 2.5*   APTT 30.8  --      LDH:    Recent Labs  Lab 09/20/17  0514 09/21/17  0526 09/22/17  0515    228 245     Microbiology:  Microbiology Results (last 7 days)     ** No results found for the last 168 hours. **          I have reviewed all pertinent labs within the past 24 hours.    Estimated Creatinine Clearance: 63 mL/min (based on SCr of 1.1 mg/dL).    Diagnostic Results:  I have reviewed and interpreted all pertinent imaging results/findings within the past 24 hours.    Assessment and Plan:     * LVAD (left ventricular assist device) present    -HeartMate 3 Implanted 7/27/2017 as DT   -s/p delayed chest closure (7/28/17) and extubated (7/29/17), reintubated 8/9/17 and extubated 8/13/17  -Implanted by Dr. Downing  -Continue Coumadin, Goal INR 2.0-3.0.   -Antiplatelets : n/a  -LDH is  stable overall today. Will continue to monitor daily.  -Speed set at 5200 rpm  -Interrogation notable for no events  -Not listed for OHTx           Stage III pressure ulcer of sacral region    - wound care        Bacteremia    - ESBL bacteremia. completed IV antibiotics   - blood cultures from 8/29 NGTD          Atrial fibrillation    -AC, will not resume Amiodarone at this time.  Telemetry has been stable and INR is sensitive         Hyperglycemia    -controlled        Atrial tachycardia    - [id number] - 3  -Rec restarting Amio. AC per CTS        AICD discharge    - Appropriate in the setting of VT aggravated by underlying AT/AFL (earlier during the admission). Device reprogrammed to VVI 80 this admission  -EP planning revision after discharge, now has lifevest. Patient will go home with lifevest, New ICD in future.           Hepatitis B core antibody positive since 2012              V-tach    - Will hold on resuming Amiodarone; Patient's telemetry is stable and INR is so sensitive.          Hyperlipidemia    -continue pravastatin        Essential hypertension    -Patient is pulsatile  -MAP goal 60-80 mmHg  -Blood pressure uncontrolled over the last 24 hours  -Antihypertensive medications include Amlodipine and Hydralazine  Will monitor trends. Increased Hydralazine today.              CHF (NYHA class IV, ACC/AHA stage D)    -NICM  -Last 2D Echo 9/1/2017: LVEF 10-15%, LVEDD 6.3 cm  -Current diuretic regimen: po lasix BID  -2g Na dietary restriction, 1500 mL fluid restriction, strict I/Os              MELISSA Long  Heart Transplant  [facility]

## 2017-09-22 NOTE — PLAN OF CARE
Problem: SLP Goal  Goal: SLP Goal  Short Term Goals:   1. Pt will complete 3-4 word mental manipulation tasks given 1 repetition with 80% acc min cues.   2. Pt will answer general information questions with 90% acc no cues.   3. Pt will name 10 items in a category in 60 seconds with no cues.   4. Pt will complete money/time management tasks with 90% acc min cues.  5. Pt will immediately recall 5+ number lists with 90% acc no repetitions.   6. Pt will follow multi-unit commands with 85% acc no repetitions.    Outcome: Ongoing (interventions implemented as appropriate)  Pt participated well w/ tx tasks.  Education provided to pt and family re: compensatory strategies for memory and attention.  Pt would benefit from ongoing Skilled Speech services upon discharge.      Mary Bolivar, RICHY-SLP  9/22/2017

## 2017-09-22 NOTE — SUBJECTIVE & OBJECTIVE
Interval History: patient ready to go home    Continuous Infusions:   DOBUTamine 2.5 mcg/kg/min (09/21/17 1501)     Scheduled Meds:   amlodipine  10 mg Oral Daily    aspirin  81 mg Oral Daily    dronabinol  5 mg Oral BID    furosemide  80 mg Oral BID    hydrALAZINE  75 mg Oral Q8H    pantoprazole  40 mg Oral Daily    potassium chloride  40 mEq Oral BID    pravastatin  20 mg Oral QHS    sodium chloride 0.9%  10 mL Intravenous Q6H    warfarin  2 mg Oral Daily     PRN Meds:sodium chloride, albuterol sulfate, bisacodyl, ceFAZolin (ANCEF) IVPB, dextrose 50%, glucagon (human recombinant), hydrocodone-acetaminophen 5-325mg, insulin aspart, ondansetron, Flushing PICC Protocol **AND** sodium chloride 0.9% **AND** sodium chloride 0.9%    Review of patient's allergies indicates:  No Known Allergies  Objective:     Vital Signs (Most Recent):  Temp: 98.4 °F (36.9 °C) (09/22/17 0800)  Pulse: 77 (09/22/17 1100)  Resp: 16 (09/22/17 0800)  BP: (!) 80/0 (09/22/17 0814)  SpO2: 98 % (09/22/17 0800) Vital Signs (24h Range):  Temp:  [98.3 °F (36.8 °C)-98.5 °F (36.9 °C)] 98.4 °F (36.9 °C)  Pulse:  [53-92] 77  Resp:  [16-18] 16  SpO2:  [92 %-98 %] 98 %  BP: ()/(0-84) 80/0     Weight: 78.2 kg (172 lb 6.4 oz)  Body mass index is 26.21 kg/m².      Intake/Output Summary (Last 24 hours) at 09/22/17 1415  Last data filed at 09/22/17 0800   Gross per 24 hour   Intake              408 ml   Output             1550 ml   Net            -1142 ml       Hemodynamic Parameters:         Physical Exam   Constitutional: He is oriented to person, place, and time. He appears well-developed and well-nourished.   HENT:   Head: Normocephalic and atraumatic.   Eyes: EOM are normal. Pupils are equal, round, and reactive to light.   Neck: Normal range of motion. Neck supple. JVD present.   Cardiovascular: Normal rate and regular rhythm.    No murmur heard.  VAD hum smooth   Pulmonary/Chest: Effort normal and breath sounds normal.   Abdominal: Soft.  Bowel sounds are normal. He exhibits no distension. There is no tenderness. There is no guarding.   Musculoskeletal: Normal range of motion. He exhibits no edema.   Neurological: He is alert and oriented to person, place, and time.   Skin: Skin is warm and dry.   Nursing note and vitals reviewed.      Significant Labs:  CBC:    Recent Labs  Lab 09/22/17 0515   WBC 6.18   RBC 2.64*   HGB 7.2*   HCT 22.4*      MCV 85   MCH 27.3   MCHC 32.1     BNP:    Recent Labs  Lab 09/22/17 0515   BNP 1,815*     CMP:    Recent Labs  Lab 09/22/17 0515   GLU 61*   CALCIUM 8.5*   ALBUMIN 2.4*   PROT 6.4      K 3.7   CO2 26      BUN 18   CREATININE 1.1   ALKPHOS 84   ALT 10   AST 18   BILITOT 1.1*      Coagulation:     Recent Labs  Lab 09/21/17 0526 09/22/17 0515   INR 2.4* 2.5*   APTT 30.8  --      LDH:    Recent Labs  Lab 09/20/17 0514 09/21/17 0526 09/22/17 0515    228 245     Microbiology:  Microbiology Results (last 7 days)     ** No results found for the last 168 hours. **          I have reviewed all pertinent labs within the past 24 hours.    Estimated Creatinine Clearance: 63 mL/min (based on SCr of 1.1 mg/dL).    Diagnostic Results:  I have reviewed and interpreted all pertinent imaging results/findings within the past 24 hours.

## 2017-09-22 NOTE — PROCEDURES
Patient in bed aaox3 with wife at bedside. VAD interrogation completed this AM in the event changes needed to be made. Will continue to monitor for further issues.     Pulsatile: Yes   VAD Sounds: HM3  Smooth  Problems / Issues / Alarms with VAD if any: None noted  HCT: 22.4   Modular Cable Connection Intact(no yellow exposed): YES      VAD Interrogation:  TXP LVAD INTERROGATIONS 9/22/2017 9/22/2017 9/22/2017 9/22/2017 9/21/2017 9/21/2017 9/21/2017   Type HeartMate3 HeartMate3 HeartMate3 HeartMate3 HeartMate3 HeartMate3 HeartMate3   Flow - 4.2 4.3 4.1 4.3 4.1 4.3   Speed - 5200 5200 5200 5200 5200 5200   PI - 3.6 3.6 3.4 3.5 3.5 3.6   Power ([place]) - 3.7 3.5 3.6 3.5 3.5 3.5   LSL - 4800 - - - - -   Pulsatility - Pulse - - - - -   Some recent data might be hidden   }

## 2017-09-22 NOTE — PROGRESS NOTES
Mr. Suman Hayden is being discharged today following admission with HM3 lvad placement. His pMH is significant for HTN, and HL.     His inr goal is 2-3 with heparin bridging as required for subtherapeutic inr. He continues on aspirin 81mg orally daily. His inr today is 2.5. He was instructed to take coumadin 2mg orally daily.     Mr. Hayden was provided a new medication administration card prior to discharge. Side effects of warfarin were reviewed as well as self monitoring and dosing. Thank you.

## 2017-09-22 NOTE — PROGRESS NOTES
DISCHARGE    SW confirmed pt and wife in agreement with plan to d/c home today with IV dobutamine via [facility] and home health via [facility] ph [phone], fax [phone]. ARINA confirmed [facility] is aware of pt's d/c and has access to his chart via Epic. ARINA confirmed [facility] is aware of d/c and has received all necessary paperwork. Per Physical therapy, pt will need a wheelchair, rollator, and bedside commode. ARINA confirmed [facility] ph [phone] received orders and has spoken to pt's wife about delivering DME to pt's home today.     Pt's wife providing transportation home. No other needs voiced or indicated. SW providing psychosocial and counseling support, education, resources, and d/c planning as needed. SW remains available.

## 2017-09-22 NOTE — PROGRESS NOTES
66yo M recently admitted from 7/18 through 9/22 and now s/p LVAD placement on 7/27/2017.  Pts PMHx: atrial tachyarrhythmia, ADHF, ICD shocks, esophagitis, HTN, HLD, s/p Medtronic CRT-D, Hep B, cardiomyopathy, ventricular tachycardia and obesity.  See calendar for recent INRs and warfarin doses while admitted.  I was able to reach the pt's wife today.  Per his wife and his med card, he was given 2mg tablets.  He will take 1 pill daily and knows to take his warfarin in the evenings.  They have our contact info and his hh agency will check an INR for us on Monday.

## 2017-09-22 NOTE — PROGRESS NOTES
Patient discharged per MD orders. Instructions given on medications, wound care, activity, signs of infection, when to call MD, and follow up appointments. Pt verbalized understanding.  Patient AAOx3, VSS, no c/o pain or discomfort at this time. Telemetry and PIV removed. Patient and family refused transport, left unit via wheelchair

## 2017-09-22 NOTE — PT/OT/SLP PROGRESS
"Speech Language Pathology  Treatment    Suman Hayden   MRN: [id number]   Admitting Diagnosis: LVAD (left ventricular assist device) present    Diet recommendations: Solid Diet Level: Regular  Liquid Diet Level: Thin standard aspiration precautions    SLP Treatment Date: 09/22/17  Speech Start Time: 0941     Speech Stop Time: 1008     Speech Total (min): 27 min       TREATMENT BILLABLE MINUTES:  Speech Therapy Individual 17 and [facility] Care/Home Management Training 10    Has the patient been evaluated by SLP for swallowing? : Yes  Keep patient NPO?: No   General Precautions: Standard, LVAD, fall  Current Respiratory Status: nasal cannula       Subjective:  "We are going home today!  Nothing is gonna stop us!"  Pt exclaimed    Pain/Comfort  Pain Rating 1: 0/10  Pain Rating Post-Intervention 1: 0/10    Objective:   Patient found with: PICC line, telemetry    Pt's wife endorsed mild memory changes post VAD for pt.  They were provided education re: interaction between attention and memory, memory strategies (lists, calendars, and routines), and activities at home to improve attention (games, puzzles, reading).  They indicated good understanding of the information provided.  Pt was able to recall meals and daily events and events surrounding recent fall.  He completed basic mental manipulation tasks w/ 3 digit reversals w/ 60% acc indep up to 80% acc given cues and repetitions.        FIM:                                 Assessment:  Suman Hayden is a 67 y.o. male with a medical diagnosis of LVAD (left ventricular assist device) present and presents with cognitive-linguistic impairment.    Discharge recommendations: Discharge Facility/Level Of Care Needs: home health speech therapy     Goals:    SLP Goals        Problem: SLP Goal    Goal Priority Disciplines Outcome   SLP Goal     SLP Ongoing (interventions implemented as appropriate)   Description:  Short Term Goals:   1. Pt will complete 3-4 word mental manipulation tasks " given 1 repetition with 80% acc min cues.   2. Pt will answer general information questions with 90% acc no cues.   3. Pt will name 10 items in a category in 60 seconds with no cues.   4. Pt will complete money/time management tasks with 90% acc min cues.  5. Pt will immediately recall 5+ number lists with 90% acc no repetitions.   6. Pt will follow multi-unit commands with 85% acc no repetitions.                Multidisciplinary Problems (Resolved)        Problem: SLP Goal    Goal Priority Disciplines Outcome   SLP Goal   (Resolved)     SLP Outcome(s) achieved   Description:  Speech Language Pathology Goals  Goals expected to be met by 8/4:  1. Pt will tolerate a dental soft diet and thin liquids without s/s of aspiration.                Problem: SLP Goal    Goal Priority Disciplines Outcome   SLP Goal   (Resolved)     SLP Outcome(s) achieved   Description:  Speech Language Pathology Goals  Goals expected to be met by 9/6   1. Pt will tolerate thin liquids with no overt s/s of aspiration.   2. Pt will tolerate dental soft diet with adequate a-p transit and no overt s/s of aspiration                            Plan:   Patient to be seen Therapy Frequency: 3 x/week   Plan of Care expires: 10/18/17  Plan of Care reviewed with: patient, spouse  SLP Follow-up?: Yes              Mary Bolivar CCC-SLP  09/22/2017

## 2017-09-22 NOTE — PLAN OF CARE
Problem: Patient Care Overview  Goal: Plan of Care Review  Outcome: Ongoing (interventions implemented as appropriate)  Plan of care reviewed with pt and spouse. VS stable. Dopplers and VAD numbers WNL. No acute events at this time.  gtt maintained. Plan for D/C in AM. Bed low and locked, bed alarm on, call light within reach. Pt remains free from falls or injury at this time. No c/o CP or SOB. Will continue to monitor. Ryanne Rubio RN

## 2017-09-25 LAB
HGB FREE PLAS-MCNC: 0.9 MG/DL (ref 0–9.7)
INR PPP: 2

## 2017-09-26 ENCOUNTER — PATIENT OUTREACH (OUTPATIENT)
Dept: ADMINISTRATIVE | Facility: CLINIC | Age: 67
End: 2017-09-26

## 2017-09-26 ENCOUNTER — ANTI-COAG VISIT (OUTPATIENT)
Dept: CARDIOLOGY | Facility: CLINIC | Age: 67
End: 2017-09-26

## 2017-09-26 ENCOUNTER — TELEPHONE (OUTPATIENT)
Dept: TRANSPLANT | Facility: CLINIC | Age: 67
End: 2017-09-26

## 2017-09-26 DIAGNOSIS — Z95.811 LVAD (LEFT VENTRICULAR ASSIST DEVICE) PRESENT: Primary | ICD-10-CM

## 2017-09-26 DIAGNOSIS — Z00.6 EXAMINATION OF PARTICIPANT IN CLINICAL TRIAL: ICD-10-CM

## 2017-09-26 DIAGNOSIS — I50.9 HEART FAILURE, UNSPECIFIED HEART FAILURE CHRONICITY, UNSPECIFIED HEART FAILURE TYPE: ICD-10-CM

## 2017-09-26 DIAGNOSIS — Z79.01 LONG TERM (CURRENT) USE OF ANTICOAGULANTS: ICD-10-CM

## 2017-09-26 DIAGNOSIS — Z95.811 LVAD (LEFT VENTRICULAR ASSIST DEVICE) PRESENT: ICD-10-CM

## 2017-09-26 NOTE — TELEPHONE ENCOUNTER
Phone call.  Received a phone call from the wife.  Pt was discharged yesterday.  He took one dose of miralax.  Pt had a bowel movement, some hard, but he feels like there is more there.  After speaking with MELISSA Pappas, I instructed th wife to give the patient one more dose of miralax and also to get a mineral enema as this might help to get the hard stool moving.  She repeated this.  We also went over his medication list and it is the same as that in the computer..

## 2017-09-26 NOTE — TELEPHONE ENCOUNTER
Phone call. I spoke with the wife.  She states that the patient did pass some of the hard stool that she removed.  He did take the dose of miralax but did not get the mineral oil enema.  He is much more comfortable.  He does still feel like there is a small amount of hard stool still there.  He has a clinic appoint ment tomorrow.

## 2017-09-26 NOTE — DISCHARGE SUMMARY
[facility]  Heart Transplant  Discharge Summary      Patient Name: Suman Hayden  MRN: [id number]  Admission Date: 7/18/2017  Hospital Length of Stay: 66 days  Discharge Date and Time: 9/22/2017 11:49 AM  Attending Physician: Sandi att. providers found   Discharging Provider: MELISSA Long  Primary Care Provider: Joe Ernst MD     HPI: 67 y.o. gentleman with PMH of NICM (EF 10%) on home  at 5 mcg/kg/min, esophagitis, HTN, HLD, s/p Medtronic CRT-D who presented to the hospital after undergoing a 6MWT and developed syncope followed by ICD shocks x 2. He reports not feeling any of them.. He has already been worked up for LVAD/OHTx and was in the process of getting a VAD however, he ended up recently being diagnosed with Hep B. He was evaluated by Hepatology who wants to repeat viral studies/US and also wants him to undergo liver biopsy.      He was seen by Dr. Leos earlier during the month and was also admitted for ADHF. He was sent home on  5 mcg/kg/min and lasix 40 mg BID. He noted increased SOB (NYHA Class III), 3 pillow orthopnea, decreased appetite and generalized weakness. He reports compliance with low salt diet and medication regimen. He reports being hospitalized 6 times in the last year in [facility] for ADHF. He reports having a recent C which showed clean coronaries (as per patient).      Device interrogation revealed ICD shock x 5.  7/14 x1, 7/17 x2, 7/18 x2    Procedure(s) (LRB):  ESOPHAGOGASTRODUODENOSCOPY (EGD) (N/A)     Hospital Course: 67 y.o. gentleman with PMH of NICM (EF 10%) on home  at 5 mcg/kg/min, esophagitis, HTN, HLD, s/p Medtronic CRT-D who presented to the hospital after undergoing a 6MWT and developed syncope followed by ICD shocks x 2. He reports not feeling any of them.. He has already been worked up for LVAD/OHTx and was in the process of getting a VAD however, he ended up recently being diagnosed with Hep B. He was evaluated by Hepatology  who wants to repeat viral studies/US and also wants him to undergo liver biopsy.      He was seen by Dr. Leos earlier during the month and was also admitted for ADHF. He was sent home on  5 mcg/kg/min and lasix 40 mg BID. He noted increased SOB (NYHA Class III), 3 pillow orthopnea, decreased appetite and generalized weakness. He reports compliance with low salt diet and medication regimen. He reports being hospitalized 6 times in the last year in [facility] for ADHF. He reports having a recent LHC which showed clean coronaries (as per patient).      Device interrogation revealed ICD shock x 5.  7/14 x1, 7/17 x2, 7/18 x2  He was amditted to CMICU and underwent LVAD placement HeartMate 3 Implanted 7/27/2017 as DT. He then had very complcated post op course including delayed chest closure (7/28/17) and extubated (7/29/17), reintubated 8/9/17 and extubated 8/13/17. He was transferred to the floor on  gtt and that evening was moved back to SICU after he suffered a cardiac arrest. He was reintubated and placed on multiple pressors. He suffered a GI bleed at that time and GI was consulted and he was transfused. His pressor requirements decreased and he was extubated; however, he then developed an ileus requiring NG tube placement for decompression and NPO. He was severly debilitated and PT was consulted. Once on  gtt alone he was transferred back to the floor and aggressive PT done. He was resumed on low dose aspirin and coumadin for a goal INR 2-3 and had no further GI bleeding. Once stable for home discharge, he was sent home for a follow up in one week as well as home health PT. He will continue on  2.5 mcg/kg/min IV for RV failure and this can be weaned as an outpatient     Consults         Status Ordering Provider     Inpatient consult to Cardiology  Once     Provider:  (Not yet assigned)    CORBIN Toro     Inpatient consult to Dietary  Once     Provider:  (Not yet assigned)     Completed HAYDE VARGAS     Inpatient consult to Dietary  Once     Provider:  (Not yet assigned)    Completed HAYDE VARGAS     Inpatient consult to Dietary  Once     Provider:  (Not yet assigned)    Completed GABBI CAMERON     Inpatient consult to Dietary  Once     Provider:  (Not yet assigned)    Completed SANDRINE CALVO     Inpatient consult to Electrophysiology  Once     Provider:  (Not yet assigned)    Completed ALEXANDRA DUEÑAS BILAL     Inpatient consult to Electrophysiology  Once     Provider:  (Not yet assigned)    Completed HAYDE ORTIZ     Inpatient consult to Electrophysiology  Once     Provider:  (Not yet assigned)    Completed TONY GRAMAJO     Inpatient consult to Endocrinology  Once     Provider:  (Not yet assigned)    Completed HAYDE VARGAS     Inpatient consult to Gastroenterology  Once     Provider:  (Not yet assigned)    Completed HAYDE VARGAS     Inpatient consult to General Surgery  Once     Provider:  (Not yet assigned)    Completed HAYDE VARGAS     Inpatient consult to Infectious Diseases  Once     Provider:  (Not yet assigned)    Completed SNEHAL RG     Inpatient consult to Infectious Diseases  Once     Provider:  (Not yet assigned)    Completed ALEXANDRA DUEÑAS BILAL     Inpatient consult to Infectious Diseases  Once     Provider:  (Not yet assigned)    Completed HAYDE VARGAS     Inpatient consult to Interventional Cardiology  Once     Provider:  (Not yet assigned)    Completed SHEIKH AZAUSTYN BILAL     Inpatient consult to Interventional Radiology  Once     Provider:  (Not yet assigned)    Completed ALEXANDRA DUEÑAS BILAL     Inpatient consult to Midline team  Once     Provider:  (Not yet assigned)    Completed CAM JAUREGUI     Inpatient consult to Nephrology  Once     Provider:  (Not yet assigned)    Completed HAYDE VARGAS     Inpatient consult to Palliative Care  Once     Provider:  (Not yet assigned)    Completed JUAN MANUEL  XENA CAMACHO     Inpatient consult to PICC team ([facility])  Once     Provider:  (Not yet assigned)    Completed HAYDE VARGAS     Inpatient consult to PICC team ([facility])  Once     Provider:  (Not yet assigned)    Completed HAYDE VARGAS     Inpatient consult to PICC team ([facility])  Once     Provider:  (Not yet assigned)    Completed CATALINA HENDERSON     Inpatient consult to PICC team ([facility])  Once     Provider:  (Not yet assigned)    Completed ONESIMO BERG          Significant Diagnostic Studies: Labs: BMP: No results for input(s): GLU, NA, K, CL, CO2, BUN, CREATININE, CALCIUM, MG in the last 48 hours.    Pending Diagnostic Studies:     Procedure Component Value Units Date/Time    APTT [[id number]] Collected:  08/04/17 1800    Order Status:  Sent Lab Status:  In process Updated:  08/04/17 1801    Specimen:  Blood from Blood     Basic metabolic panel [[id number]] Collected:  08/04/17 1800    Order Status:  Sent Lab Status:  In process Updated:  08/04/17 1801    Specimen:  Blood from Blood     CBC auto differential [[id number]] Collected:  08/23/17 0524    Order Status:  Sent Lab Status:  In process Updated:  08/23/17 0524    Specimen:  Blood from Blood     CBC auto differential [[id number]] Collected:  08/09/17 0349    Order Status:  Sent Lab Status:  In process Updated:  08/09/17 0351    Specimen:  Blood from Blood     Comprehensive metabolic panel [[id number]] Collected:  08/23/17 0523    Order Status:  Sent Lab Status:  In process Updated:  08/23/17 0524    Specimen:  Blood from Blood     Lactic acid, plasma [[id number]] Collected:  08/04/17 1800    Order Status:  Sent Lab Status:  In process Updated:  08/04/17 1801    Specimen:  Blood from Blood     Magnesium [[id number]] Collected:  08/28/17 1108    Order Status:  Sent Lab Status:  In process Updated:  08/28/17 1108    Specimen:  Blood from Blood     Magnesium [[id number]] Collected:  08/04/17 1800    Order Status:  Sent Lab Status:  In process Updated:  08/04/17 1801     Specimen:  Blood from Blood     Magnesium [[id number]] Collected:  07/22/17 0404    Order Status:  Sent Lab Status:  In process Updated:  07/22/17 0405    Specimen:  Blood from Blood     Plasma Free Hemoglobin [[id number]] Collected:  08/03/17 1032    Order Status:  Sent Lab Status:  In process Updated:  08/03/17 1033    Specimen:  Blood from Blood     Platelet Function Assay-EPI [[id number]] Collected:  07/24/17 1150    Order Status:  Sent Lab Status:  In process Updated:  07/24/17 1150    Specimen:  Blood from Blood     Potassium [[id number]] Collected:  08/28/17 1651    Order Status:  Sent Lab Status:  In process Updated:  08/28/17 1651    Specimen:  Blood from Blood         Final Active Diagnoses:    Diagnosis Date Noted POA    PRINCIPAL PROBLEM:  LVAD (left ventricular assist device) present [Z95.811] 07/29/2017 Not Applicable    Stage III pressure ulcer of sacral region [L89.153] 08/30/2017 Yes    Bacteremia [R78.81] 08/18/2017 Yes    Heart replaced by heart assist device [Z95.811]  Not Applicable    AICD (automatic cardioverter/defibrillator) present [Z95.810] 08/06/2017 Yes    Hyperglycemia [R73.9] 07/27/2017 Unknown    AICD discharge [Z45.02] 07/18/2017 Not Applicable    Hepatitis B core antibody positive since 2012 [R76.8] 07/07/2017 Yes    Hyperlipidemia [E78.5] 07/05/2017 Yes    V-tach [I47.2] 07/05/2017 Yes    Essential hypertension [I10] 07/05/2017 Yes    CHF (NYHA class IV, ACC/AHA stage D) [I50.9] 06/27/2017 Yes      Problems Resolved During this Admission:    Diagnosis Date Noted Date Resolved POA    Hypernatremia [E87.0] 08/27/2017 09/03/2017 No    Electrolyte abnormality [E87.8] 08/24/2017 08/28/2017 Yes    Upper GI bleed [K92.2] 08/18/2017 08/31/2017 No    Acute renal failure with acute tubular necrosis superimposed on stage 3 chronic kidney disease [N17.0, N18.3] 08/14/2017 09/03/2017 Unknown    Ileus [K56.7] 08/12/2017 08/14/2017 Unknown    Acute kidney injury superimposed on  "CKD [N17.9, N18.9] 08/11/2017 09/03/2017 Yes    Heart failure [I50.9] 07/28/2017 09/04/2017 Yes    Urine culture positive [R82.79] 07/26/2017 08/11/2017 Unknown    INDIRA (acute kidney injury) [N17.9] 07/19/2017 07/31/2017 Unknown    Acute on chronic combined systolic and diastolic heart failure [I50.43] 07/04/2017 08/09/2017 Yes      Discharged Condition: good    Disposition: Home or Self Care    Follow Up:  one week in LVAD clinic   Patient Instructions:     WHEELCHAIR FOR HOME USE   Order Specific Question Answer Comments   Hours in W/C per day: 5    Type of Wheelchair: Standard    Size(Width): 18"(STD adult)    Leg Support: STD footrests    Lap Belt: Velcro    Cushion: Basic    Height: 5' 8" (1.727 m)    Weight: 82 kg (180 lb 11.2 oz)    Does patient have medical equipment at home? none    Length of need (1-99 months): 99    Please check all that apply: Caregiver is capable and willing to operate wheelchair safely.    Vendor: Other (use comments) [employer]   Expected Date of Delivery: 9/21/2017      COMMODE FOR HOME USE   Order Specific Question Answer Comments   Type: Standard    Height: 5' 8" (1.727 m)    Weight: 82 kg (180 lb 11.2 oz)    Does patient have medical equipment at home? none    Length of need (1-99 months): 99    Vendor: Other (use comments) [employer]   Expected Date of Delivery: 9/21/2017      WALKER FOR HOME USE   Order Specific Question Answer Comments   Type of Walker: Adult (5'4"-6'6")    With wheels? Yes    Height: 5' 8" (1.727 m)    Weight: 82 kg (180 lb 11.2 oz)    Length of need (1-99 months): 99    Platform attachment: Bilateral    Does patient have medical equipment at home? none    Please check all that apply: Patient's condition impairs ambulation.    Vendor: Other (use comments) [employer]   Expected Date of Delivery: 9/21/2017      Ambulatory referral to Anticoagulation Monitoring   Referral Priority: Routine Referral Type: Consultation   Referral Reason: Specialty " Services Required    Requested Specialty: Cardiology    Number of Visits Requested: 1        Medications:  Reconciled Home Medications:   Discharge Medication List as of 9/22/2017 11:04 AM      START taking these medications    Details   amlodipine (NORVASC) 10 MG tablet Take 1 tablet (10 mg total) by mouth once daily., Starting Thu 9/21/2017, Until Fri 9/21/2018, Normal      DOBUTamine (DOBUTREX) 500 mg/250 mL (2,000 mcg/mL) Inject 199.75 mcg/min into the vein continuous., Starting Wed 9/20/2017, No Print      dronabinol (MARINOL) 5 MG capsule Take 1 capsule (5 mg total) by mouth 2 (two) times daily before meals., Starting Wed 9/20/2017, Print      ferrous sulfate 324 mg (65 mg iron) TbEC Take 1 tablet (325 mg total) by mouth once daily., Starting Fri 9/22/2017, Normal      hydrALAZINE (APRESOLINE) 25 MG tablet Take 3 tablets (75 mg total) by mouth every 8 (eight) hours., Starting Wed 9/20/2017, Until Thu 9/20/2018, Normal      hydrocodone-acetaminophen 5-325mg (NORCO) 5-325 mg per tablet Take 1 tablet by mouth every 6 (six) hours as needed (Acute pain, Z95.811 left ventricular assist device placement)., Starting Wed 9/20/2017, Print      pantoprazole (PROTONIX) 40 MG tablet Take 1 tablet (40 mg total) by mouth once daily., Starting Thu 9/21/2017, Until Fri 9/21/2018, Normal      potassium chloride SA (K-DUR,KLOR-CON) 20 MEQ tablet Take 2 tablets (40 mEq total) by mouth 2 (two) times daily., Starting Wed 9/20/2017, Normal      warfarin (COUMADIN) 2 MG tablet Take 1 tablet (2 mg total) by mouth Daily., Starting Fri 9/22/2017, Until Sat 9/22/2018, Normal         CONTINUE these medications which have CHANGED    Details   furosemide (LASIX) 80 MG tablet Take 1 tablet (80 mg total) by mouth 2 (two) times daily., Starting Fri 9/22/2017, Until Sat 9/22/2018, Normal         CONTINUE these medications which have NOT CHANGED    Details   aspirin 81 MG Chew Take 81 mg by mouth once daily., Historical Med      pravastatin  (PRAVACHOL) 20 MG tablet Take 20 mg by mouth every evening., Historical Med             MELISSA Long  Heart Transplant  [facility]

## 2017-09-26 NOTE — PATIENT INSTRUCTIONS
Discharge Instructions for Ventricular Assist Device (VAD)  You had a procedure to insert a ventricular assist device (VAD). This device replaces the pumping action of your heart. Usually, a VAD is inserted as a bridge to a later heart transplant. But healthcare providers have also found that a VAD gives the heart a chance to rest and recover. In some cases, the heart is able to resume some normal activity, which may eliminate the need for a heart transplant. For some people who are not candidates for a heart transplant, the VAD is considered permanent. This is referred to as destination therapy.  There are different styles and brands of VADs. Caring for your VAD will depend on the type you get. Some VADs work with a pump that uses air (pneumatic). Most newer devices are not pneumatic operated. They have a power source and a small pump.  Here's what you need to know about home care.  Activity  · Don't lift, pull, or push anything heavier than 10 pounds during the first 6 weeks after your surgery.  · Shower with care. Your VAD has an air vent and a filter. Keep fluid away from these AT ALL TIMES.  · Don't swim or play any water sports. No boating, hot tubs, or baths.  · Don't drive.  Special precautions  · Your VAD is a very special device. It needs a special team to help you with care. Always know who this team is and how to reach the coordinator.   · Keep the following near you at all times:  ¨ A hand pump (to use if the power supply of your device fails)  ¨ Hospital's paging number for the VAD coordinator heart transplant coordinator  ¨ Backup power pack with charged batteries  · Test your system every day.  · Make sure your family or someone in your home knows how to change the power supply and care for your device.  · Notify the power company that you have a VAD. The power company will place you on a priority list to have your power restored first in case of a power outage. Your VAD coordinator can assist you  with this. It should be done prior to your discharge from the hospital.  · Carry an ID card that identifies your device.  · Take your temperature every day. Call your healthcare provider or your VAD coordinator if it is above 100.4°F (38°C).  · Make sure you understand how to monitor your blood pressure with the VAD. Monitoring your blood pressure will be different. A normal blood pressure cuff will not measure it properly.   Other home care  · Change the device filter according to the directions you were given before you left the hospital. If you did not receive directions, ask for them.  · Take your medicines exactly as directed. Don't skip doses.  · Monitor your blood carefully to prevent it from being too thick or too thin, which can cause bleeding. You will have to be on blood thinners to prevent blood clots from forming in the device. Blood clots can cause a stroke or other arterial blockage.   · Eat a healthy diet. Ask your healthcare provider for menus and other diet information. Generally, you should avoid drinking more than 2 liters of water in a day or eating more than 2,000 mg of salt.     When to call your healthcare provider  Call your healthcare provider right away if you have any of the following:  · Fever of 100.4°F (38°C) or higher, or as directed by your healthcare provider  · Signs of infection at your device's exit site (redness, swelling, drainage, or warmth)  · Device alarm sounds  · Black soot in the air filter of the device  · Fatigue that doesn't get better  · Dizziness that doesn't go away  · Shortness of breath  · Chest pain  · Swollen hands, feet, or ankles   Date Last Reviewed: 10/1/2016  © 8916-3512 [employer]. [address] [place] [address], [place] [address]. All rights reserved. This information is not intended as a substitute for professional medical care. Always follow your healthcare professional's instructions.        Understanding Sepsis  Sepsis is a severe response the  body has to an infection. It is most often caused by bacteria. It is also known as septicemia, or systemic inflammatory response syndrome (SIRS). Sepsis is a medical emergency. It needs to be treated right away.  What is sepsis?  Sepsis is when the body reacts to an infection with a severe inflammatory response. It can be caused by bacteria, fungus, or a virus. Sepsis can cause many kinds of problems around the body. It can lead severe low blood pressure (shock) and organ failure. This can lead to death if not treated.  Sepsis is most common in:  · Infants and older adults  · People with an infection such as pneumonia, meningitis, or a urinary tract infection  · People who have an illness such as cancer, AIDS, or diabetes  · People being treated with chemotherapy medications or radiation  · People who have had a transplant  Symptoms of sepsis  Symptoms of sepsis can include:  · Chills and shaking  · Rapid heartbeat  · Rapid breathing  · Shortness of breath  · Severe nausea or uncontrolled vomiting  · Confusion  · Dizziness  · Decreased urination  · Severe pain, including in the back or joints   Diagnosing sepsis  If your health care provider thinks you may have sepsis, you will be given tests. You may have blood and urine tests. These are done to look for bacteria, viruses, or fungus. You may also have X-rays or other imaging tests. These may be done to look at your organs to locate the source of infection.  Treating sepsis  If you have sepsis, your health care provider will give you antibiotics through a thin, flexible tube put into a vein in your arm (IV). You will also be given fluids through the IV. You may also be given nutrition or other medications through your IV. Your health care provider will talk with you about other treatments you may need. These may include using an oxygen mask or a ventilator to help with breathing. Treatment may last at least 7 to 10 days. Sepsis must be treated in the hospital.  Date  Last Reviewed: 7/15/2015  © 5995-7622 [employer]. [address] [place] [address], [place] [address]. All rights reserved. This information is not intended as a substitute for professional medical care. Always follow your healthcare professional's instructions.        Constipation (Adult)  Constipation means that you have bowel movements that are less frequent than usual. Stools often become very hard and difficult to pass.  Constipation is very common. At some point in life it affects almost everyone. Since everyone's bowel habits are different, what is constipation to one person may not be to another. Your healthcare provider may do tests to diagnose constipation. It depends on what he or she finds when evaluating you.    Symptoms of constipation include:  · Abdominal pain  · Bloating  · Vomiting  · Painful bowel movements  · Itching, swelling, bleeding, or pain around the anus  Causes  Constipation can have many causes. These include:  · Diet low in fiber  · Too much dairy  · Not drinking enough liquids  · Lack of exercise or physical activity. This is especially true for older adults.  · Changes in lifestyle or daily routine, including pregnancy, aging, work, and travel  · Frequent use or misuse of laxatives  · Ignoring the urge to have a bowel movement or delaying it until later  · Medicines, such as certain prescription pain medicines, iron supplements, antacids, certain antidepressants, and calcium supplements  · Diseases like irritable bowel syndrome, bowel obstructions, stroke, diabetes, thyroid disease, Parkinson disease, hemorrhoids, and colon cancer  Complications  Potential complications of constipation can include:  · Hemorrhoids  · Rectal bleeding from hemorrhoids or anal fissures (skin tears)  · Hernias  · Dependency on laxatives  · Chronic constipation  · Fecal impaction  · Bowel obstruction or perforation  Home care  All treatment should be done after talking with your healthcare provider.  This is especially true if you have another medical problems, are taking prescription medicines, or are an older adult. Treatment most often involves lifestyle changes. You may also need medicines. Your healthcare provider will tell you which will work best for you. Follow the advice below to help avoid this problem in the future.  Lifestyle changes  These lifestyle changes can help prevent constipation:  · Diet. Eat a high-fiber diet, with fresh fruit and vegetables, and reduce dairy intake, meats, and processed foods  · Fluids. It's important to get enough fluids each day. Drink plenty of water when you eat more fiber. If you are on diet that limits the amount of fluid you can have, talk about this with your healthcare provider.  · Regular exercise. Check with your healthcare provider first.  Medications  Take any medicines as directed. Some laxatives are safe to use only every now and then. Others can be taken on a regular basis. Talk with your doctor or pharmacist if you have questions.  Prescription pain medicines can cause constipation. If you are taking this kind of medicine, ask your healthcare provider if you should also take a stool softener.  Medicines you may take to treat constipation include:  · Fiber supplements  · Stool softeners  · Laxatives  · Enemas  · Rectal suppositories  Follow-up care  Follow up with your healthcare provider if symptoms don't get better in the next few days. You may need to have more tests or see a specialist.  Call 911  Call 911 if any of these occur:  · Trouble breathing  · Stiff, rigid abdomen that is severely painful to touch  · Confusion  · Fainting or loss of consciousness  · Rapid heart rate  · Chest pain  When to seek medical advice  Call your healthcare provider right away if any of these occur:  · Fever over 100.4°F (38°C)  · Failure to resume normal bowel movements  · Pain in your abdomen or back gets worse  · Nausea or vomiting  · Swelling in your abdomen  · Blood  in the stool  · Black, tarry stool  · Involuntary weight loss  · Weakness  Date Last Reviewed: 12/30/2015  © 0486-3481 [employer]. [address] [place] [address], [place] [address]. All rights reserved. This information is not intended as a substitute for professional medical care. Always follow your healthcare professional's instructions.

## 2017-09-27 ENCOUNTER — LAB VISIT (OUTPATIENT)
Dept: LAB | Facility: HOSPITAL | Age: 67
End: 2017-09-27
Attending: INTERNAL MEDICINE
Payer: MEDICARE

## 2017-09-27 ENCOUNTER — ANTI-COAG VISIT (OUTPATIENT)
Dept: CARDIOLOGY | Facility: CLINIC | Age: 67
End: 2017-09-27

## 2017-09-27 ENCOUNTER — OFFICE VISIT (OUTPATIENT)
Dept: TRANSPLANT | Facility: CLINIC | Age: 67
End: 2017-09-27
Payer: MEDICARE

## 2017-09-27 ENCOUNTER — CLINICAL SUPPORT (OUTPATIENT)
Dept: TRANSPLANT | Facility: CLINIC | Age: 67
End: 2017-09-27
Payer: MEDICARE

## 2017-09-27 VITALS
HEIGHT: 68 IN | HEART RATE: 76 BPM | WEIGHT: 159 LBS | SYSTOLIC BLOOD PRESSURE: 98 MMHG | TEMPERATURE: 98 F | BODY MASS INDEX: 24.1 KG/M2

## 2017-09-27 DIAGNOSIS — Z79.01 LONG TERM (CURRENT) USE OF ANTICOAGULANTS: ICD-10-CM

## 2017-09-27 DIAGNOSIS — Z95.811 HEART REPLACED BY HEART ASSIST DEVICE: ICD-10-CM

## 2017-09-27 DIAGNOSIS — I50.22 CHRONIC SYSTOLIC CONGESTIVE HEART FAILURE: ICD-10-CM

## 2017-09-27 DIAGNOSIS — I10 ESSENTIAL HYPERTENSION: ICD-10-CM

## 2017-09-27 DIAGNOSIS — I42.8 NONISCHEMIC CARDIOMYOPATHY: ICD-10-CM

## 2017-09-27 DIAGNOSIS — Z95.811 LVAD (LEFT VENTRICULAR ASSIST DEVICE) PRESENT: Primary | ICD-10-CM

## 2017-09-27 DIAGNOSIS — Z95.811 LVAD (LEFT VENTRICULAR ASSIST DEVICE) PRESENT: ICD-10-CM

## 2017-09-27 LAB
ALBUMIN SERPL BCP-MCNC: 2.6 G/DL
ALP SERPL-CCNC: 113 U/L
ALT SERPL W/O P-5'-P-CCNC: 14 U/L
ANION GAP SERPL CALC-SCNC: 9 MMOL/L
AST SERPL-CCNC: 19 U/L
BASOPHILS # BLD AUTO: 0.02 K/UL
BASOPHILS NFR BLD: 0.3 %
BILIRUB DIRECT SERPL-MCNC: 0.7 MG/DL
BILIRUB SERPL-MCNC: 1.2 MG/DL
BNP SERPL-MCNC: 1140 PG/ML
BUN SERPL-MCNC: 21 MG/DL
CALCIUM SERPL-MCNC: 8.8 MG/DL
CHLORIDE SERPL-SCNC: 97 MMOL/L
CO2 SERPL-SCNC: 29 MMOL/L
CREAT SERPL-MCNC: 1.2 MG/DL
CRP SERPL-MCNC: 19.9 MG/L
DIFFERENTIAL METHOD: ABNORMAL
EOSINOPHIL # BLD AUTO: 0.2 K/UL
EOSINOPHIL NFR BLD: 2.7 %
ERYTHROCYTE [DISTWIDTH] IN BLOOD BY AUTOMATED COUNT: 17 %
EST. GFR  (AFRICAN AMERICAN): >60 ML/MIN/1.73 M^2
EST. GFR  (NON AFRICAN AMERICAN): >60 ML/MIN/1.73 M^2
GLUCOSE SERPL-MCNC: 84 MG/DL
HCT VFR BLD AUTO: 27.6 %
HGB BLD-MCNC: 8.7 G/DL
INR PPP: 1.9
LDH SERPL L TO P-CCNC: 288 U/L
LYMPHOCYTES # BLD AUTO: 1 K/UL
LYMPHOCYTES NFR BLD: 14.6 %
MAGNESIUM SERPL-MCNC: 1.6 MG/DL
MCH RBC QN AUTO: 26.9 PG
MCHC RBC AUTO-ENTMCNC: 31.5 G/DL
MCV RBC AUTO: 85 FL
MONOCYTES # BLD AUTO: 0.7 K/UL
MONOCYTES NFR BLD: 10.7 %
NEUTROPHILS # BLD AUTO: 4.7 K/UL
NEUTROPHILS NFR BLD: 71.4 %
PHOSPHATE SERPL-MCNC: 2.9 MG/DL
PLATELET # BLD AUTO: 230 K/UL
PMV BLD AUTO: 9.7 FL
POTASSIUM SERPL-SCNC: 4.1 MMOL/L
PREALB SERPL-MCNC: 15 MG/DL
PROT SERPL-MCNC: 7.4 G/DL
PROTHROMBIN TIME: 19.5 SEC
RBC # BLD AUTO: 3.23 M/UL
SODIUM SERPL-SCNC: 135 MMOL/L
WBC # BLD AUTO: 6.64 K/UL

## 2017-09-27 PROCEDURE — 99999 PR PBB SHADOW E&M-EST. PATIENT-LVL III: CPT | Mod: PBBFAC,,, | Performed by: INTERNAL MEDICINE

## 2017-09-27 PROCEDURE — 36415 COLL VENOUS BLD VENIPUNCTURE: CPT

## 2017-09-27 PROCEDURE — 99213 OFFICE O/P EST LOW 20 MIN: CPT | Mod: PBBFAC | Performed by: INTERNAL MEDICINE

## 2017-09-27 PROCEDURE — 86140 C-REACTIVE PROTEIN: CPT

## 2017-09-27 PROCEDURE — 83880 ASSAY OF NATRIURETIC PEPTIDE: CPT

## 2017-09-27 PROCEDURE — 85025 COMPLETE CBC W/AUTO DIFF WBC: CPT

## 2017-09-27 PROCEDURE — 84134 ASSAY OF PREALBUMIN: CPT

## 2017-09-27 PROCEDURE — 82248 BILIRUBIN DIRECT: CPT

## 2017-09-27 PROCEDURE — 93750 INTERROGATION VAD IN PERSON: CPT | Mod: S$PBB,,, | Performed by: INTERNAL MEDICINE

## 2017-09-27 PROCEDURE — 83735 ASSAY OF MAGNESIUM: CPT

## 2017-09-27 PROCEDURE — 83615 LACTATE (LD) (LDH) ENZYME: CPT

## 2017-09-27 PROCEDURE — 84100 ASSAY OF PHOSPHORUS: CPT

## 2017-09-27 PROCEDURE — 93750 INTERROGATION VAD IN PERSON: CPT | Mod: PBBFAC | Performed by: INTERNAL MEDICINE

## 2017-09-27 PROCEDURE — 1159F MED LIST DOCD IN RCRD: CPT | Mod: ,,, | Performed by: INTERNAL MEDICINE

## 2017-09-27 PROCEDURE — 85610 PROTHROMBIN TIME: CPT

## 2017-09-27 PROCEDURE — 1126F AMNT PAIN NOTED NONE PRSNT: CPT | Mod: ,,, | Performed by: INTERNAL MEDICINE

## 2017-09-27 PROCEDURE — 80053 COMPREHEN METABOLIC PANEL: CPT

## 2017-09-27 PROCEDURE — 99214 OFFICE O/P EST MOD 30 MIN: CPT | Mod: S$PBB,,, | Performed by: INTERNAL MEDICINE

## 2017-09-27 RX ORDER — SPIRONOLACTONE 25 MG/1
12.5 TABLET ORAL DAILY
Qty: 15 TABLET | Refills: 11 | Status: SHIPPED | OUTPATIENT
Start: 2017-09-27 | End: 2017-10-26 | Stop reason: SDUPTHER

## 2017-09-27 NOTE — LETTER
September 27, 2017        Joe Ernst  [address]  [place] [address]  Phone: [phone]  Fax: [phone]             [facility] [address] [place] [address]  Phone: [phone]   Patient: Suman Hayden   MR Number: [id number]   YOB: 1950   Date of Visit: 9/27/2017       Dear Dr. Joe Ernst    Thank you for referring Suman Hayden to me for evaluation. Attached you will find relevant portions of my assessment and plan of care.    If you have questions, please do not hesitate to call me. I look forward to following Suman Hayden along with you.    Sincerely,    Jin Franklin MD    Enclosure    If you would like to receive this communication electronically, please contact [email or web address] or [phone] to request [employer] access.    [employer] is a tool which provides read-only access to select patient information with whom you have a relationship. Its easy to use and provides real time access to review your patients record including encounter summaries, notes, results, and demographic information.    If you feel you have received this communication in error or would no longer like to receive these types of communications, please e-mail [email or web address]

## 2017-09-27 NOTE — PATIENT INSTRUCTIONS
Take an extra lasix today and tomorrow  Take an extra potassium 20meq today and tomorrow  Start Spironolactone 12.5mg po daily

## 2017-09-27 NOTE — PROCEDURES
TXP SHERRI INTERROGATIONS 9/27/2017 9/22/2017 9/20/2017 9/20/2017 9/20/2017 9/19/2017 9/19/2017   Type HeartMate3 - - - - - -   Flow 4.1 - - - - - -   Speed 5200 - - - - - -   PI 3.5 - - - - - -   Power ([place]) 3.6 - - - - - -   LSL 4800 - - - - - -   Pulsatility Pulse Pulse Pulse Pulse Pulse Pulse Pulse   }    CARLIN Franklin MD  Transplant Cardiology

## 2017-09-27 NOTE — PROGRESS NOTES
Subjective:   Patient ID:  Suman Hayden is a 67 y.o. male who presents for LVAD followup visit.    Implant Date:7/27/17     HM 3 RPM: 5200     INR goal: 2-3   Bridge with Heparin   Antiplatelets: ASA 81     TXP SHERRI INTERROGATIONS 9/27/2017   Type HeartMate3   Flow 4.1   Speed 5200   PI 3.5   Power ([place]) 3.6   LSL 4800   Pulsatility Pulse       HPI   67 y.o. gentleman with PMH of NICM (EF 10%) on home  at 5 mcg/kg/min, esophagitis, HTN, HLD, s/p [employer] CRT-D who underwent LVAD placement HeartMate 3 Implanted 7/27/2017 as DT. He then had very complcated post op course including delayed chest closure (7/28/17) and extubated (7/29/17), reintubated 8/9/17 and extubated 8/13/17. He was transferred to the floor on  gtt and that evening was moved back to SICU after he suffered a cardiac arrest. He was reintubated and placed on multiple pressors. He suffered a GI bleed at that time and GI was consulted and he was transfused. His pressor requirements decreased and he was extubated; however, he then developed an ileus requiring NG tube placement for decompression and NPO. He was severly debilitated and PT was consulted. Once on  gtt alone he was transferred back to the floor and aggressive PT done. He was resumed on low dose aspirin and coumadin for a goal INR 2-3 and had no further GI bleeding. Once stable for home discharge, he was sent home for a follow up in one week as well as home health PT. He is still on  2.5 mcg/kg/min IV for RV failure and plan is to be weaned as an outpatient. He presents today 1st clinic visit.     Today, he reports constipation and coccyx pain (fall day prior to d/c on his gluteus). His wife disimpacted him yesterday with relief of constipation pain. He and his wife report that when they got home their A/C was leaking in their house so her daughter had then move in with them while getting it fixed. There was a medication mix-up (she temporarily lost his meds for 1-2 days but found  "them). He has no cardiopulmonary complaints and reports feeling "great". INR 1.9, , BNP 1140 (high but lower than last week). Only other complaint is BLE swelling/edema.    Review of Systems   Constitution: Negative for chills, fever, malaise/fatigue, night sweats and weight loss.   HENT: Negative for congestion and sore throat.    Eyes: Negative for blurred vision and visual disturbance.   Cardiovascular: Negative for chest pain, dyspnea on exertion, leg swelling, near-syncope, orthopnea, palpitations, paroxysmal nocturnal dyspnea and syncope.   Respiratory: Negative for cough, shortness of breath and wheezing.    Endocrine: Negative for cold intolerance and heat intolerance.   Hematologic/Lymphatic: Negative for adenopathy.   Skin: Negative for itching and rash.   Musculoskeletal: Positive for arthritis and joint pain. Negative for back pain and myalgias.   Gastrointestinal: Negative for bloating, abdominal pain, change in bowel habit, nausea and vomiting.   Genitourinary: Negative for dysuria and hesitancy.   Neurological: Negative for dizziness, headaches, numbness and sensory change.   Psychiatric/Behavioral: Negative for altered mental status and depression.       Objective:   Blood pressure (!) 98/0, pulse 76, temperature 97.5 °F (36.4 °C), height 5' 8" (1.727 m), weight 72.1 kg (159 lb).body mass index is 24.18 kg/m².    Doppler: 98, very pulsatile  MAP: 84    Physical Exam   Constitutional: He is oriented to person, place, and time. He appears well-developed and well-nourished. No distress.   HENT:   Head: Normocephalic and atraumatic.   Mouth/Throat: Oropharynx is clear and moist.   Eyes: Conjunctivae are normal. Pupils are equal, round, and reactive to light. No scleral icterus.   Neck: Normal range of motion. No JVD present. No thyromegaly present.   Cardiovascular: Normal rate and regular rhythm.    Normal LVAD Hum   Pulmonary/Chest: Effort normal. No respiratory distress. He has no wheezes. He " has no rales.   Abdominal: Soft. Bowel sounds are normal. He exhibits no distension. There is no tenderness.   Driveline c/d/i (see picture)   Musculoskeletal: Normal range of motion. He exhibits no edema.   Neurological: He is alert and oriented to person, place, and time. No cranial nerve deficit.   Skin: Skin is warm. No rash noted. He is not diaphoretic. No erythema.   Psychiatric: He has a normal mood and affect. His behavior is normal.       Lab Results   Component Value Date    WBC 6.64 09/27/2017    HGB 8.7 (L) 09/27/2017    HCT 27.6 (L) 09/27/2017    MCV 85 09/27/2017     09/27/2017    CO2 29 09/27/2017    CREATININE 1.2 09/27/2017    CALCIUM 8.8 09/27/2017    ALBUMIN 2.6 (L) 09/27/2017    AST 19 09/27/2017    BNP 1,140 (H) 09/27/2017    ALT 14 09/27/2017     (H) 09/27/2017       Lab Results   Component Value Date    INR 1.9 (H) 09/27/2017    INR 2.0 09/25/2017    INR 2.5 (H) 09/22/2017       BNP   Date Value Ref Range Status   09/27/2017 1,140 (H) 0 - 99 pg/mL Final     Comment:     Values of less than 100 pg/ml are consistent with non-CHF populations.   09/22/2017 1,815 (H) 0 - 99 pg/mL Final     Comment:     Values of less than 100 pg/ml are consistent with non-CHF populations.   09/20/2017 1,461 (H) 0 - 99 pg/mL Final     Comment:     Values of less than 100 pg/ml are consistent with non-CHF populations.       LD   Date Value Ref Range Status   09/27/2017 288 (H) 110 - 260 U/L Final     Comment:     Results are increased in hemolyzed samples.   09/22/2017 245 110 - 260 U/L Final     Comment:     Results are increased in hemolyzed samples.   09/21/2017 228 110 - 260 U/L Final     Comment:     Results are increased in hemolyzed samples.       Assessment:      1. LVAD (left ventricular assist device) present    2. Heart replaced by heart assist device    3. Nonischemic cardiomyopathy    4. Essential hypertension    5. Chronic systolic congestive heart failure    6. Long term (current) use of  anticoagulants        Plan:   - Pt feels well today  - volume up on exam and BP up; instructed to take an extra lasix 80mg today and tomorrow (with extra 20meq KCl)  - for BP and volume, will start Spironolactone 12.5mg po daily; labs next week with labs to ensure toleration    RTC 1 week    2 boxes of soap and water ordered    Patient is now NYHA III  Recommend 2 gram sodium restriction and 1500cc fluid restriction.  Encourage physical activity with graded exercise program.  Requested patient to weigh themselves daily, and to notify us if their weight increases by more than 3 lbs in 1 day or 5 lbs in 1 week.     Listed for transplant: No    UNOS Patient Status  Functional Status: 40% - Disabled: requires special care and assistance  Physical Capacity: Limited Mobility  Working for Income: No  If no, reason not working: Disability     CARLIN Franklin MD  Transplant Cardiology

## 2017-09-27 NOTE — PROGRESS NOTES
Date of Implant with Heartmate 3 LVAD: 17    PATIENT ARRIVED IN CLINIC:  Ambulatory in w/c  Accompanied by: wife    Vitals  Temperature, oral: 97.5  PAIN: 6 on 0-10 pain scale, location of pain: tailbone, description of pain: sore  Is patient currently on medications for pain? yes What kind? Perc 5/325  Does this help relieve the pain? yes    VAD Interrogation:  TXP SHERRI INTERROGATIONS 2017   Type HeartMate3   Flow 4.1   Speed 5200   PI 3.5   Power ([place]) 3.6   LSL 4800   Pulsatility Pulse       Flow in history:  History [id number].[email or web address]  HCT:   Lab Results   Component Value Date    HCT 27.6 (L) 2017    HCT 29 (L) 2017         Problems / Issues / Alarms with VAD if any: None noted  Any Equipment Issues: None noted (Refer to  note for complete details)   Emergency Equipment With Patient: yes   VAD Binder With Patient: yes   Reviewed VAD Numbers In Binder: yes  VAD Sounds: HM3 sound Smooth  Manual & Visual Inspection Of Driveline: No kinks or tears noted    LVAD Dressing/DLES:  Appearance Of Driveline: 1  Antibiotics: NO  Velour: no  Frequency of Dressing Changes: daily & soap and water dressing  Stabilization Device In Use: yes, delgado securement device    Modular Cable Connection Intact: No yellow exposed  Pt In Need Of Management Kits?:Yes - 2 boxes soap and water ordered  It is medically necessary to have VAD management kits in order to prevent infection or to assist in the healing of an infected DLES.    Assessment:   Complaints/reason for visit today: routine  Complaints Of Nausea / Vomiting: None noted    Appearance and Frequency Of Stools: normal and formed without blood & constipated  Color Of Urine: clear/yellow  Coping/Depression/Anxiety: coping okay  Sleep Habits: 7-9 hrs /night  Sleep Aids: None noted  Showering: No  Activity/Exercise: with PT daily   Driving: No.    Labs:    Chemistry        Component Value Date/Time     (L) 2017 1442    K 4.1  09/27/2017 1442    CL 97 09/27/2017 1442    CO2 29 09/27/2017 1442    BUN 21 09/27/2017 1442    CREATININE 1.2 09/27/2017 1442    GLU 84 09/27/2017 1442        Component Value Date/Time    CALCIUM 8.8 09/27/2017 1442    ALKPHOS 113 09/27/2017 1442    AST 19 09/27/2017 1442    ALT 14 09/27/2017 1442    BILITOT 1.2 (H) 09/27/2017 1442    ESTGFRAFRICA >60.0 09/27/2017 1442    EGFRNONAA >60.0 09/27/2017 1442            Magnesium   Date Value Ref Range Status   09/27/2017 1.6 1.6 - 2.6 mg/dL Final       Lab Results   Component Value Date    WBC 6.64 09/27/2017    HGB 8.7 (L) 09/27/2017    HCT 27.6 (L) 09/27/2017    MCV 85 09/27/2017     09/27/2017       Lab Results   Component Value Date    INR 1.9 (H) 09/27/2017    INR 2.0 09/25/2017    INR 2.5 (H) 09/22/2017       BNP   Date Value Ref Range Status   09/27/2017 1,140 (H) 0 - 99 pg/mL Final     Comment:     Values of less than 100 pg/ml are consistent with non-CHF populations.   09/22/2017 1,815 (H) 0 - 99 pg/mL Final     Comment:     Values of less than 100 pg/ml are consistent with non-CHF populations.   09/20/2017 1,461 (H) 0 - 99 pg/mL Final     Comment:     Values of less than 100 pg/ml are consistent with non-CHF populations.       LD   Date Value Ref Range Status   09/27/2017 288 (H) 110 - 260 U/L Final     Comment:     Results are increased in hemolyzed samples.   09/22/2017 245 110 - 260 U/L Final     Comment:     Results are increased in hemolyzed samples.   09/21/2017 228 110 - 260 U/L Final     Comment:     Results are increased in hemolyzed samples.       Labs reviewed with patient: YES      Patient Satisfaction Survey completed per patient: no  (explained about signature and box to check)  Medication reconciliation: per MA.  Purple card updated today: yes  Coumadin Managed by: [facility], 1.9    Education: Reviewed driveline care, emergency procedures, how to change the controller, alarms with patient, as well as discussed how to page  the VAD coordinator in case of an emergency.      Plans/Needs: Pt looks and feels very good. Pt with bnp 1140,sl ankle swelling. Per Dr Franklin pt instructed to take an extra dose of lasix and K+ today and tomorrow. Also, spironolactone 12.5 mg daily to start tomorrow. Pt may continue to take mirolax daily as needed for constipation. RTC next week. Please refer to MD note.     Hurricane Season: Yes, discussed with patient: With hurricane season approaching, we want to make sure you are fully prepared for any emergency.  Should the National Weather Service or your local authorities recommend a voluntary or mandatory evacuation of your area, The VAD team requires you to evacuate to a safe place.  Remember, when it is a mandatory evacuation, traffic will become an issue for your limited battery power.  Therefore, we strongly urge you to evacuate early.    The VAD team advises you to have the following in place before hurricane season:  Have an evacuation plan in place including places to evacuate, names and phone numbers.  This information is required to be given to the VAD coordinator.   1. Have your VAD emergency contact numbers with you.   2. Make sure your prescriptions will not run out by the end of September.   3. Make sure you have enough medications, including pills, inhalers, patches,   etc. to take, should you be gone for more than 2 weeks.   4. Make sure ALL of your batteries are fully charged.   5. Bring enough dressing change supplies to last for at least 2 weeks.   6. Bring your VAD binder with you.  Make sure your binder is updated and complete with alarms reference card, patient hand book, emergency contact numbers, daily log sheets, etc.  If you do not have family or friends as an evacuation destination, we recommend evacuating to a safe area.   Do NOT evacuate to [facility].    The VAD team wants to stress the importance of planning for your evacuation in the event of a hurricane.  If you have  any LVAD questions or issues, please contact the LVAD coordinator.

## 2017-10-02 ENCOUNTER — ANTI-COAG VISIT (OUTPATIENT)
Dept: CARDIOLOGY | Facility: CLINIC | Age: 67
End: 2017-10-02

## 2017-10-02 DIAGNOSIS — Z95.811 LVAD (LEFT VENTRICULAR ASSIST DEVICE) PRESENT: ICD-10-CM

## 2017-10-02 LAB — INR PPP: 2.7

## 2017-10-04 ENCOUNTER — CLINICAL SUPPORT (OUTPATIENT)
Dept: TRANSPLANT | Facility: CLINIC | Age: 67
End: 2017-10-04
Payer: MEDICARE

## 2017-10-04 ENCOUNTER — ANTI-COAG VISIT (OUTPATIENT)
Dept: CARDIOLOGY | Facility: CLINIC | Age: 67
End: 2017-10-04

## 2017-10-04 ENCOUNTER — OFFICE VISIT (OUTPATIENT)
Dept: TRANSPLANT | Facility: CLINIC | Age: 67
End: 2017-10-04
Payer: MEDICARE

## 2017-10-04 ENCOUNTER — LAB VISIT (OUTPATIENT)
Dept: LAB | Facility: HOSPITAL | Age: 67
End: 2017-10-04
Attending: INTERNAL MEDICINE
Payer: MEDICARE

## 2017-10-04 VITALS
HEIGHT: 68 IN | HEART RATE: 78 BPM | TEMPERATURE: 98 F | WEIGHT: 152.38 LBS | SYSTOLIC BLOOD PRESSURE: 92 MMHG | BODY MASS INDEX: 23.09 KG/M2

## 2017-10-04 DIAGNOSIS — Z95.811 LVAD (LEFT VENTRICULAR ASSIST DEVICE) PRESENT: ICD-10-CM

## 2017-10-04 DIAGNOSIS — Z79.01 ANTICOAGULATION MONITORING, INR RANGE 2-3: ICD-10-CM

## 2017-10-04 DIAGNOSIS — I47.20 V-TACH: ICD-10-CM

## 2017-10-04 DIAGNOSIS — E78.2 MIXED HYPERLIPIDEMIA: ICD-10-CM

## 2017-10-04 DIAGNOSIS — I50.22 CHRONIC SYSTOLIC CONGESTIVE HEART FAILURE: ICD-10-CM

## 2017-10-04 DIAGNOSIS — Z95.810 AICD (AUTOMATIC CARDIOVERTER/DEFIBRILLATOR) PRESENT: ICD-10-CM

## 2017-10-04 DIAGNOSIS — Z95.811 HEART REPLACED BY HEART ASSIST DEVICE: ICD-10-CM

## 2017-10-04 DIAGNOSIS — I48.19 PERSISTENT ATRIAL FIBRILLATION: ICD-10-CM

## 2017-10-04 DIAGNOSIS — I42.8 NONISCHEMIC CARDIOMYOPATHY: Primary | ICD-10-CM

## 2017-10-04 PROBLEM — I50.84 CHF (NYHA CLASS IV, ACC/AHA STAGE D): Status: RESOLVED | Noted: 2017-06-27 | Resolved: 2017-10-04

## 2017-10-04 PROBLEM — R78.81 BACTEREMIA: Status: RESOLVED | Noted: 2017-08-18 | Resolved: 2017-10-04

## 2017-10-04 PROBLEM — Z51.5 PALLIATIVE CARE ENCOUNTER: Status: RESOLVED | Noted: 2017-07-25 | Resolved: 2017-10-04

## 2017-10-04 LAB
ALBUMIN SERPL BCP-MCNC: 3 G/DL
ALP SERPL-CCNC: 105 U/L
ALT SERPL W/O P-5'-P-CCNC: 12 U/L
ANION GAP SERPL CALC-SCNC: 11 MMOL/L
AST SERPL-CCNC: 19 U/L
BASOPHILS # BLD AUTO: 0.07 K/UL
BASOPHILS NFR BLD: 1.2 %
BILIRUB DIRECT SERPL-MCNC: 0.6 MG/DL
BILIRUB SERPL-MCNC: 1.1 MG/DL
BNP SERPL-MCNC: 1431 PG/ML
BUN SERPL-MCNC: 21 MG/DL
CALCIUM SERPL-MCNC: 9.5 MG/DL
CHLORIDE SERPL-SCNC: 99 MMOL/L
CO2 SERPL-SCNC: 27 MMOL/L
CREAT SERPL-MCNC: 1.1 MG/DL
CRP SERPL-MCNC: 3 MG/L
DIFFERENTIAL METHOD: ABNORMAL
EOSINOPHIL # BLD AUTO: 0.2 K/UL
EOSINOPHIL NFR BLD: 3.6 %
ERYTHROCYTE [DISTWIDTH] IN BLOOD BY AUTOMATED COUNT: 17.1 %
EST. GFR  (AFRICAN AMERICAN): >60 ML/MIN/1.73 M^2
EST. GFR  (NON AFRICAN AMERICAN): >60 ML/MIN/1.73 M^2
GLUCOSE SERPL-MCNC: 97 MG/DL
HCT VFR BLD AUTO: 30.4 %
HGB BLD-MCNC: 9.5 G/DL
INR PPP: 2.6
LDH SERPL L TO P-CCNC: 245 U/L
LYMPHOCYTES # BLD AUTO: 1.1 K/UL
LYMPHOCYTES NFR BLD: 18.6 %
MAGNESIUM SERPL-MCNC: 2.2 MG/DL
MCH RBC QN AUTO: 27 PG
MCHC RBC AUTO-ENTMCNC: 31.3 G/DL
MCV RBC AUTO: 86 FL
MONOCYTES # BLD AUTO: 0.7 K/UL
MONOCYTES NFR BLD: 10.9 %
NEUTROPHILS # BLD AUTO: 4 K/UL
NEUTROPHILS NFR BLD: 65.4 %
PHOSPHATE SERPL-MCNC: 3.7 MG/DL
PLATELET # BLD AUTO: 315 K/UL
PMV BLD AUTO: 9.3 FL
POTASSIUM SERPL-SCNC: 4.1 MMOL/L
PREALB SERPL-MCNC: 22 MG/DL
PROT SERPL-MCNC: 8 G/DL
PROTHROMBIN TIME: 26.1 SEC
RBC # BLD AUTO: 3.52 M/UL
SODIUM SERPL-SCNC: 137 MMOL/L
WBC # BLD AUTO: 6.06 K/UL

## 2017-10-04 PROCEDURE — 83880 ASSAY OF NATRIURETIC PEPTIDE: CPT

## 2017-10-04 PROCEDURE — 85025 COMPLETE CBC W/AUTO DIFF WBC: CPT

## 2017-10-04 PROCEDURE — 99999 PR PBB SHADOW E&M-EST. PATIENT-LVL III: CPT | Mod: PBBFAC,,, | Performed by: INTERNAL MEDICINE

## 2017-10-04 PROCEDURE — 84134 ASSAY OF PREALBUMIN: CPT

## 2017-10-04 PROCEDURE — 80053 COMPREHEN METABOLIC PANEL: CPT

## 2017-10-04 PROCEDURE — 99214 OFFICE O/P EST MOD 30 MIN: CPT | Mod: S$PBB,,, | Performed by: INTERNAL MEDICINE

## 2017-10-04 PROCEDURE — 83615 LACTATE (LD) (LDH) ENZYME: CPT

## 2017-10-04 PROCEDURE — 85610 PROTHROMBIN TIME: CPT

## 2017-10-04 PROCEDURE — 99213 OFFICE O/P EST LOW 20 MIN: CPT | Mod: PBBFAC | Performed by: INTERNAL MEDICINE

## 2017-10-04 PROCEDURE — 86140 C-REACTIVE PROTEIN: CPT

## 2017-10-04 PROCEDURE — 84100 ASSAY OF PHOSPHORUS: CPT

## 2017-10-04 PROCEDURE — 83735 ASSAY OF MAGNESIUM: CPT

## 2017-10-04 PROCEDURE — 36415 COLL VENOUS BLD VENIPUNCTURE: CPT

## 2017-10-04 PROCEDURE — 93750 INTERROGATION VAD IN PERSON: CPT | Mod: PBBFAC | Performed by: INTERNAL MEDICINE

## 2017-10-04 PROCEDURE — 82248 BILIRUBIN DIRECT: CPT

## 2017-10-04 NOTE — PROCEDURES
TXP SHERRI INTERROGATIONS 10/4/2017 9/27/2017 9/22/2017 9/20/2017 9/20/2017 9/20/2017 9/19/2017   Type HeartMate3 HeartMate3 - - - - -   Flow 4.1 4.1 - - - - -   Speed 5200 5200 - - - - -   PI 3.5 3.5 - - - - -   Power ([place]) 3.6 3.6 - - - - -   LSL 4800 4800 - - - - -   Pulsatility Pulse Pulse Pulse Pulse Pulse Pulse Pulse   }

## 2017-10-04 NOTE — PROGRESS NOTES
Subjective:   Patient ID:  Suman Hayden is a 67 y.o. male who presents for LVAD followup visit.    Implant Date:7/27/17     HM 3 RPM: 5200     INR goal: 2-3   Bridge with Heparin   Antiplatelets: ASA 81     TXP SHERRI INTERROGATIONS 10/4/2017   Type HeartMate3   Flow 4.1   Speed 5200   PI 3.5   Power ([place]) 3.6   LSL 4800   Pulsatility Pulse       HPI   67 y.o. gentleman with PMH of NICM (EF 10%) on home  at 5 mcg/kg/min, esophagitis, HTN, HLD, s/p [employer] CRT-D who underwent LVAD placement HeartMate 3 Implanted 7/27/2017 as DT. He then had very complcated post op course including delayed chest closure (7/28/17) and extubated (7/29/17), reintubated 8/9/17 and extubated 8/13/17. He was transferred to the floor on  gtt and that evening was moved back to SICU after he suffered a cardiac arrest. He was reintubated and placed on multiple pressors. He suffered a GI bleed at that time and GI was consulted and he was transfused. His pressor requirements decreased and he was extubated; however, he then developed an ileus requiring NG tube placement for decompression and NPO. He was severly debilitated and PT was consulted. Once on  gtt alone he was transferred back to the floor and aggressive PT done. He was resumed on low dose aspirin and coumadin for a goal INR 2-3 and had no further GI bleeding. Once stable for home discharge, he was sent home for a follow up in one week as well as home health PT. He is still on  2.5 mcg/kg/min IV for RV failure and plan is to be weaned as an outpatient. He presents today 2nd clinic visit.     Since last visit, pt was instructed to increase his lasix but only took the extra 20mg recommended once or twice because his feet havent been puffy- has been working with PT/OT at home but today remains in a wheelchair  hes says his breathing has been good and he has not been swelling much- wt has been 153-9  Wife says his appetite is still poor- will ask for something but when she puts  "it in front of him he might only take 1-2 bites- is drinking ensure    DLES is grade 1    Interrogation of device data reveals 4-5 no ext power alarms (backup battery kicked in) and low voltages, normal flows and power (see VAD interrogation report for full details.) Wife unaware of power loss to the house          Review of Systems   Constitution: Negative for chills, fever, malaise/fatigue, night sweats and weight loss.   HENT: Negative for congestion and sore throat.    Eyes: Negative for blurred vision and visual disturbance.   Cardiovascular: Negative for chest pain, dyspnea on exertion, leg swelling, near-syncope, orthopnea, palpitations, paroxysmal nocturnal dyspnea and syncope.   Respiratory: Negative for cough, shortness of breath and wheezing.    Endocrine: Negative for cold intolerance and heat intolerance.   Hematologic/Lymphatic: Negative for adenopathy.   Skin: Negative for itching and rash.   Musculoskeletal: Positive for arthritis and joint pain. Negative for back pain and myalgias.   Gastrointestinal: Negative for bloating, abdominal pain, change in bowel habit, nausea and vomiting.   Genitourinary: Negative for dysuria and hesitancy.   Neurological: Negative for dizziness, headaches, numbness and sensory change.   Psychiatric/Behavioral: Negative for altered mental status and depression.       Objective:   Blood pressure (!) 92/0, pulse 78, temperature 97.6 °F (36.4 °C), temperature source Oral, height 5' 8" (1.727 m), weight 69.1 kg (152 lb 6.3 oz).body mass index is 23.17 kg/m².    Doppler: 92 very pulsatile  MAP:  77    Physical Exam   Constitutional: He is oriented to person, place, and time. He appears well-developed and well-nourished. No distress.   HENT:   Head: Normocephalic and atraumatic.   Mouth/Throat: Oropharynx is clear and moist.   Eyes: Conjunctivae are normal. Pupils are equal, round, and reactive to light. No scleral icterus.   Neck: Normal range of motion. No JVD present. No " thyromegaly present.   Cardiovascular: Normal rate and regular rhythm.    Normal LVAD Hum   Pulmonary/Chest: Effort normal. No respiratory distress. He has no wheezes. He has no rales.   Abdominal: Soft. Bowel sounds are normal. He exhibits no distension. There is no tenderness.   Driveline c/d/i (see picture)   Musculoskeletal: Normal range of motion. He exhibits no edema.   Neurological: He is alert and oriented to person, place, and time. No cranial nerve deficit.   Skin: Skin is warm. No rash noted. He is not diaphoretic. No erythema.   Psychiatric: He has a normal mood and affect. His behavior is normal.       Lab Results   Component Value Date    WBC 6.06 10/04/2017    HGB 9.5 (L) 10/04/2017    HCT 30.4 (L) 10/04/2017    MCV 86 10/04/2017     10/04/2017    CO2 27 10/04/2017    CREATININE 1.1 10/04/2017    CALCIUM 9.5 10/04/2017    ALBUMIN 3.0 (L) 10/04/2017    AST 19 10/04/2017    BNP 1,431 (H) 10/04/2017    ALT 12 10/04/2017     10/04/2017       Lab Results   Component Value Date    INR 2.6 (H) 10/04/2017    INR 2.7 10/02/2017    INR 1.9 (H) 09/27/2017       BNP   Date Value Ref Range Status   10/04/2017 1,431 (H) 0 - 99 pg/mL Final     Comment:     Values of less than 100 pg/ml are consistent with non-CHF populations.   09/27/2017 1,140 (H) 0 - 99 pg/mL Final     Comment:     Values of less than 100 pg/ml are consistent with non-CHF populations.   09/22/2017 1,815 (H) 0 - 99 pg/mL Final     Comment:     Values of less than 100 pg/ml are consistent with non-CHF populations.       LD   Date Value Ref Range Status   10/04/2017 245 110 - 260 U/L Final     Comment:     Results are increased in hemolyzed samples.   09/27/2017 288 (H) 110 - 260 U/L Final     Comment:     Results are increased in hemolyzed samples.   09/22/2017 245 110 - 260 U/L Final     Comment:     Results are increased in hemolyzed samples.       Assessment:      1. Nonischemic cardiomyopathy- with very high BNP but no fluid on  "exam, very debilitated   2. Heart replaced by heart assist device    3. Chronic systolic congestive heart failure    4. AICD (automatic cardioverter/defibrillator) present    5. Persistent atrial fibrillation    6. Anticoagulation monitoring, INR range 2-3    7. V-tach    8. Mixed hyperlipidemia        Plan:   -  Given how debilitated pt is I'm not comfortable with him showering yet- instructed to use a shower chair for now and cont sponge baths  -will send waveforms off to [facility]  - suspect pt will need to increase speed, echo already scheduled for next week- remain concerned about the high BNP but based on his exam I don't think adjusting his lasix is needed today- should cont 80mg bid along with aldactone 12.5mg daily  - Instructed If his is are going to drink ensure every day to let coumadin clinic know so that they can increase his coumadin dose    Keep salt intake to under 2000 mg sodium, fluids to under 2 L (64 oz)    Call us with more shortness of breath, more swelling or unexpected weight changes, fever, chills, alarms, bloody or black bowel movements, or drainage from  driveline    For Upper respiratory tract infections: Cough syrup (robitussin with or without DM)  Is fine  Benadryl, zyrtec, claritin, allegra, also ok, as long as there isn't a "D"   Mucinex DM is ok, as is Flonase and saline nasal spray    For pain: Tylenol is ok. Avoid nonsteroidal anti-inflammatory drugs (advil, motrin aleve, ibuprofen and naproxen)      Flu shot    Patient is now NYHA III  Recommend 2 gram sodium restriction and 1500cc fluid restriction.  Encourage physical activity with graded exercise program.  Requested patient to weigh themselves daily, and to notify us if their weight increases by more than 3 lbs in 1 day or 5 lbs in 1 week.     Listed for transplant: No    UNOS Patient Status  Functional Status: 40% - Disabled: requires special care and assistance  Physical Capacity: Limited Mobility  Working for Income: No  If " no, reason not working: Disability     F/u 1 wk with labs, echo and interrogation,

## 2017-10-04 NOTE — LETTER
October 4, 2017        Joe Ernst  [address] [place] [address]  Phone: [phone]  Fax: [phone]             [facility] [address] [place] [address]  Phone: [phone]   Patient: Suman Hayden   MR Number: [id number]   YOB: 1950   Date of Visit: 10/4/2017       Dear Dr. Joe Ernst    Thank you for referring Suman Hayden to me for evaluation. Attached you will find relevant portions of my assessment and plan of care.    If you have questions, please do not hesitate to call me. I look forward to following Suman Hayden along with you.    Sincerely,    Graciela Bautista MD    Enclosure    If you would like to receive this communication electronically, please contact [email or web address] or [phone] to request [employer] Link access.    [employer] Link is a tool which provides read-only access to select patient information with whom you have a relationship. Its easy to use and provides real time access to review your patients record including encounter summaries, notes, results, and demographic information.    If you feel you have received this communication in error or would no longer like to receive these types of communications, please e-mail [email or web address]

## 2017-10-06 NOTE — PROGRESS NOTES
Date of Implant with Heartmate 3 LVAD: 17    PATIENT ARRIVED IN CLINIC:  wheelchair  Accompanied by: wife    Vitals  PAIN: denies on 0-10 pain scale  Is patient currently on medications for pain? no What kind? n/a    VAD Interrogation:  TXP SHERRI INTERROGATIONS 10/4/2017   Type HeartMate3   Flow 4.1   Speed 5200   PI 3.5   Power ([place]) 3.6   LSL 4800   Pulsatility Pulse       Flow in history: 4-6  History [id number]  HCT:   Lab Results   Component Value Date    HCT 30.4 (L) 10/04/2017    HCT 29 (L) 2017         Problems / Issues / Alarms with VAD if any:  Pt noted to have multiple no external power alarms noted in pt history over last few days.  All occurring late in the afternoon.  Pt wife report pt is on MPU power during these times and denies accidentally disconnecting pt power supply.  Pt wife ADAMANT about connections being tight and free of dirt.  Silverio to inspect equipment.  Will send waveforms today.   Any Equipment Issues: None noted (Refer to  note for complete details)   Emergency Equipment With Patient: yes   VAD Binder With Patient: yes   Reviewed VAD Numbers In Binder: yes  VAD Sounds: HM3 sound Smooth  Manual & Visual Inspection Of Driveline: No kinks or tears noted    LVAD Dressing/DLES:  Appearance Of Driveline: 1  Antibiotics: NO  Velour: no  Frequency of Dressing Changes: daily & soap and water dressing  Stabilization Device In Use: yes, delgado securement device    Modular Cable Connection Intact: No yellow exposed  Pt In Need Of Management Kits?:No  It is medically necessary to have VAD management kits in order to prevent infection or to assist in the healing of an infected DLES.    Assessment:   Complaints/reason for visit today: routine  Complaints Of Nausea / Vomiting: None noted    Appearance and Frequency Of Stools: normal and formed without blood & every other day  Color Of Urine: clear/yellow  Coping/Depression/Anxiety: coping okay  Sleep Habits: 5-6  "hrs /night  Sleep Aids: None noted  Showering: No - pt wife instructed on how to "sponge bathe" pt while he sits on shower bench.   Activity/Exercise: pt reports working with PT.OT   Driving: No.    Labs:    Chemistry        Component Value Date/Time     10/04/2017 1142    K 4.1 10/04/2017 1142    CL 99 10/04/2017 1142    CO2 27 10/04/2017 1142    BUN 21 10/04/2017 1142    CREATININE 1.1 10/04/2017 1142    GLU 97 10/04/2017 1142        Component Value Date/Time    CALCIUM 9.5 10/04/2017 1142    ALKPHOS 105 10/04/2017 1142    AST 19 10/04/2017 1142    ALT 12 10/04/2017 1142    BILITOT 1.1 (H) 10/04/2017 1142    ESTGFRAFRICA >60.0 10/04/2017 1142    EGFRNONAA >60.0 10/04/2017 1142            Magnesium   Date Value Ref Range Status   10/04/2017 2.2 1.6 - 2.6 mg/dL Final       Lab Results   Component Value Date    WBC 6.06 10/04/2017    HGB 9.5 (L) 10/04/2017    HCT 30.4 (L) 10/04/2017    MCV 86 10/04/2017     10/04/2017       Lab Results   Component Value Date    INR 2.6 (H) 10/04/2017    INR 2.7 10/02/2017    INR 1.9 (H) 09/27/2017       BNP   Date Value Ref Range Status   10/04/2017 1,431 (H) 0 - 99 pg/mL Final     Comment:     Values of less than 100 pg/ml are consistent with non-CHF populations.   09/27/2017 1,140 (H) 0 - 99 pg/mL Final     Comment:     Values of less than 100 pg/ml are consistent with non-CHF populations.   09/22/2017 1,815 (H) 0 - 99 pg/mL Final     Comment:     Values of less than 100 pg/ml are consistent with non-CHF populations.       LD   Date Value Ref Range Status   10/04/2017 245 110 - 260 U/L Final     Comment:     Results are increased in hemolyzed samples.   09/27/2017 288 (H) 110 - 260 U/L Final     Comment:     Results are increased in hemolyzed samples.   09/22/2017 245 110 - 260 U/L Final     Comment:     Results are increased in hemolyzed samples.       Labs reviewed with patient: YES      Patient Satisfaction Survey completed per patient: no  (explained about " "signature and box to check)  Medication reconciliation: per MA.  Purple card updated today: yes  Coumadin Managed by: [facility],     Education: Reviewed driveline care, emergency procedures, how to change the controller, alarms with patient, as well as discussed how to page the VAD coordinator in case of an emergency.      Plans/Needs:  PT doing ok.  Pt looks somewhat disheveled and smells of urine.  Pt BNP 1400 today.  Pt wife reports they were instructed to take extra 20mg of lasix daily but she has only done "a few times".  Pt wife instructed to make sure pt is getting 80 mg lasix BID and 12.5 aldactone. Waveforms sent for "no external power alarms".  Pt to RTC in 1 week with echo.  Please refer to MD note.     Hurricane Season: No      "

## 2017-10-09 ENCOUNTER — ANTI-COAG VISIT (OUTPATIENT)
Dept: CARDIOLOGY | Facility: CLINIC | Age: 67
End: 2017-10-09

## 2017-10-09 DIAGNOSIS — Z95.811 LVAD (LEFT VENTRICULAR ASSIST DEVICE) PRESENT: ICD-10-CM

## 2017-10-09 DIAGNOSIS — Z79.01 ANTICOAGULATION MONITORING, INR RANGE 2-3: ICD-10-CM

## 2017-10-09 LAB — INR PPP: 3

## 2017-10-11 ENCOUNTER — OFFICE VISIT (OUTPATIENT)
Dept: TRANSPLANT | Facility: CLINIC | Age: 67
End: 2017-10-11
Payer: MEDICARE

## 2017-10-11 ENCOUNTER — CLINICAL SUPPORT (OUTPATIENT)
Dept: TRANSPLANT | Facility: CLINIC | Age: 67
End: 2017-10-11
Payer: MEDICARE

## 2017-10-11 ENCOUNTER — HOSPITAL ENCOUNTER (OUTPATIENT)
Dept: CARDIOLOGY | Facility: CLINIC | Age: 67
Discharge: HOME | End: 2017-10-11
Payer: MEDICARE

## 2017-10-11 ENCOUNTER — RESEARCH ENCOUNTER (OUTPATIENT)
Dept: RESEARCH | Facility: HOSPITAL | Age: 67
End: 2017-10-11

## 2017-10-11 ENCOUNTER — DOCUMENTATION ONLY (OUTPATIENT)
Dept: NUTRITION | Facility: CLINIC | Age: 67
End: 2017-10-11

## 2017-10-11 ENCOUNTER — ANTI-COAG VISIT (OUTPATIENT)
Dept: CARDIOLOGY | Facility: CLINIC | Age: 67
End: 2017-10-11

## 2017-10-11 ENCOUNTER — SOCIAL WORK (OUTPATIENT)
Dept: TRANSPLANT | Facility: CLINIC | Age: 67
End: 2017-10-11

## 2017-10-11 VITALS
HEIGHT: 68 IN | HEART RATE: 156 BPM | SYSTOLIC BLOOD PRESSURE: 80 MMHG | BODY MASS INDEX: 22.69 KG/M2 | WEIGHT: 149.69 LBS | TEMPERATURE: 99 F

## 2017-10-11 DIAGNOSIS — E78.2 MIXED HYPERLIPIDEMIA: ICD-10-CM

## 2017-10-11 DIAGNOSIS — E46 PROTEIN DEFICIENCY: ICD-10-CM

## 2017-10-11 DIAGNOSIS — R11.0 NAUSEA: ICD-10-CM

## 2017-10-11 DIAGNOSIS — E46 MALNUTRITION, UNSPECIFIED TYPE: ICD-10-CM

## 2017-10-11 DIAGNOSIS — Z79.01 ANTICOAGULATION MONITORING, INR RANGE 2-3: ICD-10-CM

## 2017-10-11 DIAGNOSIS — Z95.811 LVAD (LEFT VENTRICULAR ASSIST DEVICE) PRESENT: ICD-10-CM

## 2017-10-11 DIAGNOSIS — I50.22 CHRONIC SYSTOLIC CONGESTIVE HEART FAILURE: ICD-10-CM

## 2017-10-11 DIAGNOSIS — K21.9 GASTROESOPHAGEAL REFLUX DISEASE WITHOUT ESOPHAGITIS: ICD-10-CM

## 2017-10-11 DIAGNOSIS — R64 CACHEXIA: ICD-10-CM

## 2017-10-11 DIAGNOSIS — Z95.811 HEART REPLACED BY HEART ASSIST DEVICE: Primary | ICD-10-CM

## 2017-10-11 DIAGNOSIS — I51.7 CARDIOMEGALY: ICD-10-CM

## 2017-10-11 DIAGNOSIS — Z95.810 AICD (AUTOMATIC CARDIOVERTER/DEFIBRILLATOR) PRESENT: ICD-10-CM

## 2017-10-11 DIAGNOSIS — Z00.6 EXAMINATION OF PARTICIPANT IN CLINICAL TRIAL: ICD-10-CM

## 2017-10-11 DIAGNOSIS — I10 ESSENTIAL HYPERTENSION: ICD-10-CM

## 2017-10-11 LAB
DIASTOLIC DYSFUNCTION: YES
ESTIMATED PA SYSTOLIC PRESSURE: 41.41
MITRAL VALVE REGURGITATION: ABNORMAL
RETIRED EF AND QEF - SEE NOTES: 10 (ref 55–65)
TRICUSPID VALVE REGURGITATION: ABNORMAL

## 2017-10-11 PROCEDURE — 93306 TTE W/DOPPLER COMPLETE: CPT | Mod: PBBFAC | Performed by: INTERNAL MEDICINE

## 2017-10-11 PROCEDURE — 99999 PR PBB SHADOW E&M-EST. PATIENT-LVL III: CPT | Mod: PBBFAC,,, | Performed by: INTERNAL MEDICINE

## 2017-10-11 PROCEDURE — 99214 OFFICE O/P EST MOD 30 MIN: CPT | Mod: S$PBB,,, | Performed by: INTERNAL MEDICINE

## 2017-10-11 PROCEDURE — 93750 INTERROGATION VAD IN PERSON: CPT | Mod: PBBFAC | Performed by: INTERNAL MEDICINE

## 2017-10-11 PROCEDURE — 99213 OFFICE O/P EST LOW 20 MIN: CPT | Mod: PBBFAC,25 | Performed by: INTERNAL MEDICINE

## 2017-10-11 RX ORDER — DRONABINOL 5 MG/1
5 CAPSULE ORAL
Qty: 60 CAPSULE | Refills: 5 | Status: SHIPPED | OUTPATIENT
Start: 2017-10-11 | End: 2017-10-26 | Stop reason: SDUPTHER

## 2017-10-11 RX ORDER — ONDANSETRON 4 MG/1
4 TABLET, ORALLY DISINTEGRATING ORAL EVERY 8 HOURS PRN
Qty: 30 TABLET | Refills: 1 | Status: SHIPPED | OUTPATIENT
Start: 2017-10-11 | End: 2017-10-26 | Stop reason: SDUPTHER

## 2017-10-11 RX ORDER — AMLODIPINE BESYLATE 10 MG/1
10 TABLET ORAL DAILY
Qty: 30 TABLET | Refills: 11 | Status: SHIPPED | OUTPATIENT
Start: 2017-10-11 | End: 2017-10-26 | Stop reason: SDUPTHER

## 2017-10-11 RX ORDER — PANTOPRAZOLE SODIUM 40 MG/1
40 TABLET, DELAYED RELEASE ORAL DAILY
Qty: 30 TABLET | Refills: 11 | Status: SHIPPED | OUTPATIENT
Start: 2017-10-11 | End: 2017-10-26 | Stop reason: SDUPTHER

## 2017-10-11 RX ORDER — ONDANSETRON 4 MG/1
4 TABLET, ORALLY DISINTEGRATING ORAL EVERY 8 HOURS PRN
Qty: 30 TABLET | Refills: 1 | Status: SHIPPED | OUTPATIENT
Start: 2017-10-11 | End: 2017-10-11 | Stop reason: SDUPTHER

## 2017-10-11 RX ORDER — HYDRALAZINE HYDROCHLORIDE 25 MG/1
75 TABLET, FILM COATED ORAL EVERY 8 HOURS
Qty: 270 TABLET | Refills: 11 | Status: SHIPPED | OUTPATIENT
Start: 2017-10-11 | End: 2017-10-26 | Stop reason: SDUPTHER

## 2017-10-11 NOTE — LETTER
October 11, 2017        Joe Ernst  [address] [place] [address]  Phone: [phone]  Fax: [phone]             [facility] [address] [place] [address]  Phone: [phone]   Patient: Suman Hayden   MR Number: [id number]   YOB: 1950   Date of Visit: 10/11/2017       Dear Dr. Joe Ernst    Thank you for referring Suman Hayden to me for evaluation. Attached you will find relevant portions of my assessment and plan of care.    If you have questions, please do not hesitate to call me. I look forward to following Suman Hayden along with you.    Sincerely,    Mohit Ambrosio MD    Enclosure    If you would like to receive this communication electronically, please contact [email or web address] or [phone] to request [employer] Link access.    [employer] is a tool which provides read-only access to select patient information with whom you have a relationship. Its easy to use and provides real time access to review your patients record including encounter summaries, notes, results, and demographic information.    If you feel you have received this communication in error or would no longer like to receive these types of communications, please e-mail [email or web address]

## 2017-10-11 NOTE — PROGRESS NOTES
MNT:    I received phone call today from pt's  requesting coupons for nutrition supplements.  Apparently pt with unintentional weight loss and poor appetite.  I mailed [employer] coupons and the following handouts to pt's home address in :  High-Calorie, High-Protein Shopping and Cooking Tips; Suggestions for Increasing Calories and Protein.    Recommend nutrition consult if pt on heart transplant pre-evaluation list.    Astrid Jaime RD, LDN  [phone]

## 2017-10-11 NOTE — PROGRESS NOTES
Study: Momentum 3 CAP  Sponsor: [employer]  Follow-up Visit: 90 day post implant  Date of Visit: 11Oct2017    Patient wishes to continue in study: Yes  All study protocol required CRFs completed: Yes    Mr. Hayden is here for the 3 month follow up visit. Current list of medications obtained and verified; he denies any hospitalizations, ED visits, or any other adverse events since previous study follow-up. All questions answered to his satisfaction and he will contact research staff if he has any questions or concerns. Next follow up visit is in 3 months.    The following tests and procedures are related to research study:  Labs, Echo, VAD clinic visit

## 2017-10-11 NOTE — PROGRESS NOTES
INR has maintained an upward trend despite vitamin K foods on 10/9. He will begin a reduced weekly dose until follow-up.

## 2017-10-11 NOTE — PROCEDURES
TXP SHERRI INTERROGATIONS 10/11/2017 10/4/2017 9/27/2017 9/22/2017 9/20/2017 9/20/2017 9/20/2017   Type HeartMate3 HeartMate3 HeartMate3 - - - -   Flow 3.7 4.1 4.1 - - - -   Speed 5200 5200 5200 - - - -   PI 6.5 3.5 3.5 - - - -   Power ([place]ca) 3.7 3.6 3.6 - - - -   LSL 4800 4800 4800 - - - -   Pulsatility Pulse Pulse Pulse Pulse Pulse Pulse Pulse   }

## 2017-10-11 NOTE — PROGRESS NOTES
Patient advised new dose .  Wife stated patient had a kale smoothie on 10/9.  Appetite has been poor . Reports patient is having 2-4 ensures daily. Wife stated MD said he can have six if needed.  No other changes reports .  HH faxed.

## 2017-10-11 NOTE — PROGRESS NOTES
SW followed up with pt and wife today in LVAD clinic. Both alert/oriented x4 and pleasant.    Pt has home Dobutamine from [employer] and [employer]. Pt has necessary DME.    Pt doing ok but reports great difficulty with eating and is losing considerable weight. ARINA/MD scheduled pt with Astrid Jaime, dietician, on October 26th the day of his next appointment here. Following recommendations SW to look into the possibility of ordering oral nutritional supplement, such as Boost or Insure, through [employer]. Astrid has mailed pt coupons for $3 off of Boost. Pt wife states this is the only thing he can sometimes get down.    SW provided much support, education and resources. SW to follow up with pt and Astrid Jaime at his next appointment. SW continues to follow and remains available.

## 2017-10-11 NOTE — PROGRESS NOTES
"Subjective:   Patient ID:  Suman Hayden is a 67 y.o. male who presents for LVAD followup visit.      Implant Date:7/27/17     3 RPM: 5200  91312     INR goal: 2-3   Bridge with Heparin   Antiplatelets: ASA 81     TXP SHERRI INTERROGATIONS 10/11/2017   Type HeartMate3   Flow 3.7   Speed 5200   PI 6.5   Power ([place]) 3.7   LSL 4800   Pulsatility Pulse       HPI   Mr. Hayden is a very pleasant 67 y.o. gentleman with PMH of NICM (EF 10%) on home  at 5 mcg/kg/min, esophagitis, HTN, HLD, s/p [employer] CRT-D who underwent LVAD placement HeartMate 3 Implanted 7/27/2017 as DT. He then had very complcated post op course including delayed chest closure (7/28/17) and extubated (7/29/17), reintubated 8/9/17 and extubated 8/13/17. He was transferred to the floor on  gtt and that evening was moved back to SICU after he suffered a cardiac arrest. He was reintubated and placed on multiple pressors. He suffered a GI bleed at that time and GI was consulted and he was transfused. His pressor requirements decreased and he was extubated; however, he then developed an ileus requiring NG tube placement for decompression and NPO. He was severly debilitated and PT was consulted. Once on  gtt alone he was transferred back to the floor and aggressive PT done. He was resumed on low dose aspirin and coumadin for a goal INR 2-3 and had no further GI bleeding. Once stable for home discharge, he was sent home for a follow up in one week as well as home health PT. He is still on  2.5 mcg/kg/min IV for RV failure and plan is to be weaned as an outpatient. He presents today for his 3rd clinic visit. Continues to improve. VAD speed is at 5200 rpm. No VAD alarms noted on interrogation occasional PI events . BP is 80 mm of Hg. DLES is a "1". INR is supratherapeutic at 3.1. LDH is at baseline 197.     Review of Systems   Constitution: Negative. Negative for chills, decreased appetite, diaphoresis, fever, weakness, malaise/fatigue, night sweats, " "weight gain and weight loss.   Eyes: Negative.    Cardiovascular: Negative for chest pain, claudication, cyanosis, dyspnea on exertion, irregular heartbeat, leg swelling, near-syncope, orthopnea, palpitations, paroxysmal nocturnal dyspnea and syncope.   Respiratory: Negative for cough, hemoptysis and shortness of breath.    Endocrine: Negative.    Hematologic/Lymphatic: Negative.    Skin: Negative for color change, dry skin and nail changes.   Musculoskeletal: Negative.    Gastrointestinal: Negative.    Genitourinary: Negative.        Objective:   Blood pressure (!) 80/0, pulse (!) 156, temperature 98.9 °F (37.2 °C), temperature source Oral, height 5' 8" (1.727 m), weight 67.9 kg (149 lb 11.1 oz).body mass index is 22.76 kg/m².    Doppler: 80    Physical Exam   Constitutional: He appears well-developed.   BP (!) 80/0 (BP Location: Left arm, Patient Position: Sitting, BP Method: Medium (Manual))   Pulse (!) 156   Temp 98.9 °F (37.2 °C) (Oral)   Ht 5' 8" (1.727 m)   Wt 67.9 kg (149 lb 11.1 oz)   BMI 22.76 kg/m²      HENT:   Head: Normocephalic.   Neck: No JVD present. Carotid bruit is not present.   Cardiovascular: Regular rhythm and normal heart sounds.    No murmur heard.  Smooth VAD hum. DLES is "1"   Pulmonary/Chest: Effort normal and breath sounds normal. No respiratory distress. He has no wheezes. He has no rales.   Abdominal: Soft. Bowel sounds are normal. He exhibits no distension. There is no tenderness. There is no rebound.   Musculoskeletal: He exhibits no edema.   Neurological: He is alert.   Skin: Skin is warm.   Vitals reviewed.      Lab Results   Component Value Date    WBC 7.26 10/11/2017    HGB 10.2 (L) 10/11/2017    HCT 32.0 (L) 10/11/2017    MCV 83 10/11/2017     10/11/2017    CO2 27 10/11/2017    CREATININE 1.5 (H) 10/11/2017    CALCIUM 9.8 10/11/2017    ALBUMIN 3.2 (L) 10/11/2017    AST 18 10/11/2017     (H) 10/11/2017    ALT 11 10/11/2017     10/11/2017       Lab Results "   Component Value Date    INR 3.1 (H) 10/11/2017    INR 3.0 10/09/2017    INR 2.6 (H) 10/04/2017       BNP   Date Value Ref Range Status   10/11/2017 852 (H) 0 - 99 pg/mL Final     Comment:     Values of less than 100 pg/ml are consistent with non-CHF populations.   10/04/2017 1,431 (H) 0 - 99 pg/mL Final     Comment:     Values of less than 100 pg/ml are consistent with non-CHF populations.   09/27/2017 1,140 (H) 0 - 99 pg/mL Final     Comment:     Values of less than 100 pg/ml are consistent with non-CHF populations.       LD   Date Value Ref Range Status   10/11/2017 197 110 - 260 U/L Final     Comment:     Results are increased in hemolyzed samples.   10/04/2017 245 110 - 260 U/L Final     Comment:     Results are increased in hemolyzed samples.   09/27/2017 288 (H) 110 - 260 U/L Final     Comment:     Results are increased in hemolyzed samples.     Assessment:      1. Heart replaced by heart assist device    2. Chronic systolic congestive heart failure    3. Mixed hyperlipidemia    4. Essential hypertension    5. AICD (automatic cardioverter/defibrillator) present        Plan:   Patient is now NYHA class II. BP is controlled.  INR is supratherapeutic.  VAD interrogation was performed in clinic  Does not need VAD supplies.   Recommend 2 gram sodium restriction and 1500cc fluid restriction.  Encourage physical activity with graded exercise program.  Requested patient to weigh themselves daily, and to notify us if their weight increases by more than 3 lbs in 1 day or 5 lbs in 1 week.     Listed for transplant: No, DT    UNOS Patient Status  Functional Status: 50% - Requires considerable assistance and frequent medical care  Physical Capacity: Limited Mobility  Working for Income: Unknown    Mohit Ambrosio MD

## 2017-10-11 NOTE — PROGRESS NOTES
"Date of Implant with Heartmate 3 LVAD: 17    PATIENT ARRIVED IN CLINIC:  Ambulatory  Accompanied by:Son    Vitals  Doppler:80  Pulsatile:yes  Temperature, oral: 98.9  PAIN:0 on 0-10 pain scale,   location of pain:   na,   description of pain:  na  Is patient currently on medications for pain?no   What kind? na  Does this help relieve the pain? na    VAD Interrogation:  TXP SHERRI INTERROGATIONS 10/11/2017   Type HeartMate3   Flow 3.7   Speed 5200   PI 6.5   Power ([place]) 3.7   LSL 4800   Pulsatility Pulse       Flow in history: 3.8-4.5  History [id number]  HCT:32.0    Problems / Issues / Alarms with VAD if any:none   Any Equipment Issues? (Refer to equipment coordinators note for complete details):none  Emergency Equipment With Patient:yes   VAD Binder With Patient: yes   Reviewed VAD Numbers In Binder:yes  VAD Sounds: HM3 sound   LVAD Dressing/DLES:  Appearance Of Driveline:"1"  Antibiotics:NO  Velour:no  Frequency of Dressing Changes:Daily with daily "scrubby" kits   Stabilization Device In Use: YES [place]  [place]    Manual & Visual Inspection Of Driveline:  NO KINKS OR TEARS NOTED   Modular Cable Connection Intact(no yellow exposed): YES    Attempted to unscrew modular cable to ensure it will be able to come lose in the event we ever need to change the modular cable while pt held the driveline in place so it would not move. Modular cable connection able to be unscrewed and re-tightened.  Instructed pt to perform this weekly.     Pt In Need Of Management Kits?:no   It is medically necessary to have VAD management kits in order to prevent infection or to assist in the healing of an infected DLES.    Assessment:   Complaints/reason for visit today: Routine follow up  Complaints Of Nausea / Vomiting:patient states he is having some nausea which is affecting his appetite.    Appearance and Frequency Of Stools:normal and formed without blood daily  Patient reports urine is clear and yellow:  YES   "   Coping/Depression/Anxiety:patient coping well  Sleep Habits:na hrs /night  Sleep Aids:no  Showering :NO, Reminded patient to change dressing immediately after shower and drying off.   Activity/Exercise:PT/OT daily walks   Driving:no, Reminded to pull over should there be an alarm before looking down at controller.     Labs:    Chemistry        Component Value Date/Time     10/11/2017 1140    K 4.4 10/11/2017 1140    CL 98 10/11/2017 1140    CO2 27 10/11/2017 1140    BUN 27 (H) 10/11/2017 1140    CREATININE 1.5 (H) 10/11/2017 1140     10/11/2017 1140        Component Value Date/Time    CALCIUM 9.8 10/11/2017 1140    ALKPHOS 98 10/11/2017 1140    AST 18 10/11/2017 1140    ALT 11 10/11/2017 1140    BILITOT 1.0 10/11/2017 1140    ESTGFRAFRICA 54.9 (A) 10/11/2017 1140    EGFRNONAA 47.5 (A) 10/11/2017 1140            Magnesium   Date Value Ref Range Status   10/11/2017 2.0 1.6 - 2.6 mg/dL Final       Lab Results   Component Value Date    WBC 7.26 10/11/2017    HGB 10.2 (L) 10/11/2017    HCT 32.0 (L) 10/11/2017    MCV 83 10/11/2017     10/11/2017       Lab Results   Component Value Date    INR 3.1 (H) 10/11/2017    INR 3.0 10/09/2017    INR 2.6 (H) 10/04/2017       BNP   Date Value Ref Range Status   10/11/2017 852 (H) 0 - 99 pg/mL Final     Comment:     Values of less than 100 pg/ml are consistent with non-CHF populations.   10/04/2017 1,431 (H) 0 - 99 pg/mL Final     Comment:     Values of less than 100 pg/ml are consistent with non-CHF populations.   09/27/2017 1,140 (H) 0 - 99 pg/mL Final     Comment:     Values of less than 100 pg/ml are consistent with non-CHF populations.       LD   Date Value Ref Range Status   10/11/2017 197 110 - 260 U/L Final     Comment:     Results are increased in hemolyzed samples.   10/04/2017 245 110 - 260 U/L Final     Comment:     Results are increased in hemolyzed samples.   09/27/2017 288 (H) 110 - 260 U/L Final     Comment:     Results are increased in hemolyzed  "samples.       Labs reviewed with patient: YES      Patient Satisfaction Survey completed per patient: no  (explained about signature and box to check)  Medication reconciliation: per MA.  Purple card updated today:NO  Coumadin Managed by: [facility],     Education: Reviewed driveline care, emergency procedures, how to change the controller, alarms with patient.  Also reviewed how to get in touch with a VAD coordinator in the event of an emergency.         Plans/Needs:Patient presents today for third visit since implant. Patient's only complaint today is that he is not able to eat well and is losing weight. Patient states Marinol does help somewhat but he is still only able to eat "a bite here and there". Marinol refilled per Dr. Ambrosio. Patient also given zofran for nausea. Additionally, patient referred to dietician. Patient to RTC 2 weeks. Refer to MD note.   "

## 2017-10-16 LAB — INR PPP: 2.8

## 2017-10-17 ENCOUNTER — ANTI-COAG VISIT (OUTPATIENT)
Dept: CARDIOLOGY | Facility: CLINIC | Age: 67
End: 2017-10-17

## 2017-10-17 DIAGNOSIS — Z95.811 LVAD (LEFT VENTRICULAR ASSIST DEVICE) PRESENT: ICD-10-CM

## 2017-10-17 DIAGNOSIS — Z79.01 ANTICOAGULATION MONITORING, INR RANGE 2-3: ICD-10-CM

## 2017-10-17 NOTE — PROGRESS NOTES
Documentation of the ultasound findings, images, and interpretations with be available in the patient's Viewpoint report located in the Chart Review Imaging tab in [employer].     Wife questioned and reports patient taking 2 mg 9/26 and 3 mg 9/27.  Boost three times daily .  Patient reports having cucumber and tomato salad .   Miralax daily and also reports fluid retention  bilateral feet.  No other changes reported.  Can be reach on patient mobile .

## 2017-10-17 NOTE — PROGRESS NOTES
Verbal result taken from yesica _________. PT/INR 29.0/2.8_______ Date drawn_10/16/17_______ Hardcopy to be faxed.

## 2017-10-23 LAB — INR PPP: 2.1

## 2017-10-24 ENCOUNTER — ANTI-COAG VISIT (OUTPATIENT)
Dept: CARDIOLOGY | Facility: CLINIC | Age: 67
End: 2017-10-24

## 2017-10-24 ENCOUNTER — TELEPHONE (OUTPATIENT)
Dept: TRANSPLANT | Facility: CLINIC | Age: 67
End: 2017-10-24

## 2017-10-24 DIAGNOSIS — Z95.811 LVAD (LEFT VENTRICULAR ASSIST DEVICE) PRESENT: ICD-10-CM

## 2017-10-24 DIAGNOSIS — Z79.01 ANTICOAGULATION MONITORING, INR RANGE 2-3: ICD-10-CM

## 2017-10-24 NOTE — PROGRESS NOTES
Verbal result taken from Bri_________. PT/INR 22.9/2.1_______ Date drawn_10/23/17_______ Hardcopy to be faxed.

## 2017-10-24 NOTE — TELEPHONE ENCOUNTER
Phone call.  I spoke with the wife.  Mr serrano is doing much better today.  He is dealing with some constipation which is a recurrent problem.  He has no further queasy stomach.  I related to her the weight of a life vest, 3 pounds.  It is 10 pounds for the lvad.  That would be a total of 10 ponds to subtract from his weight.

## 2017-10-26 ENCOUNTER — ANTI-COAG VISIT (OUTPATIENT)
Dept: CARDIOLOGY | Facility: CLINIC | Age: 67
End: 2017-10-26

## 2017-10-26 ENCOUNTER — LAB VISIT (OUTPATIENT)
Dept: LAB | Facility: HOSPITAL | Age: 67
End: 2017-10-26
Payer: MEDICARE

## 2017-10-26 ENCOUNTER — OFFICE VISIT (OUTPATIENT)
Dept: TRANSPLANT | Facility: CLINIC | Age: 67
End: 2017-10-26
Payer: MEDICARE

## 2017-10-26 ENCOUNTER — NUTRITION (OUTPATIENT)
Dept: NUTRITION | Facility: CLINIC | Age: 67
End: 2017-10-26
Payer: MEDICARE

## 2017-10-26 ENCOUNTER — CLINICAL SUPPORT (OUTPATIENT)
Dept: TRANSPLANT | Facility: CLINIC | Age: 67
End: 2017-10-26
Payer: MEDICARE

## 2017-10-26 VITALS
HEART RATE: 63 BPM | BODY MASS INDEX: 21.67 KG/M2 | SYSTOLIC BLOOD PRESSURE: 64 MMHG | HEIGHT: 68 IN | TEMPERATURE: 98 F | WEIGHT: 143 LBS

## 2017-10-26 VITALS — BODY MASS INDEX: 23.12 KG/M2 | WEIGHT: 152.56 LBS | HEIGHT: 68 IN

## 2017-10-26 DIAGNOSIS — Z00.8 NUTRITIONAL ASSESSMENT: ICD-10-CM

## 2017-10-26 DIAGNOSIS — Z95.811 HEART REPLACED BY HEART ASSIST DEVICE: ICD-10-CM

## 2017-10-26 DIAGNOSIS — I50.22 CHRONIC SYSTOLIC CONGESTIVE HEART FAILURE: Primary | ICD-10-CM

## 2017-10-26 DIAGNOSIS — Z76.82 AWAITING ORGAN TRANSPLANT: ICD-10-CM

## 2017-10-26 DIAGNOSIS — I10 ESSENTIAL HYPERTENSION: ICD-10-CM

## 2017-10-26 DIAGNOSIS — Z79.01 ANTICOAGULATION MONITORING, INR RANGE 2-3: ICD-10-CM

## 2017-10-26 DIAGNOSIS — Z95.811 LVAD (LEFT VENTRICULAR ASSIST DEVICE) PRESENT: ICD-10-CM

## 2017-10-26 DIAGNOSIS — R11.0 NAUSEA: ICD-10-CM

## 2017-10-26 DIAGNOSIS — K21.9 GASTROESOPHAGEAL REFLUX DISEASE WITHOUT ESOPHAGITIS: ICD-10-CM

## 2017-10-26 LAB
ALBUMIN SERPL BCP-MCNC: 3.2 G/DL
ALP SERPL-CCNC: 74 U/L
ALT SERPL W/O P-5'-P-CCNC: 11 U/L
ANION GAP SERPL CALC-SCNC: 10 MMOL/L
AST SERPL-CCNC: 18 U/L
BASOPHILS # BLD AUTO: 0.04 K/UL
BASOPHILS NFR BLD: 0.7 %
BILIRUB DIRECT SERPL-MCNC: 0.6 MG/DL
BILIRUB SERPL-MCNC: 1 MG/DL
BNP SERPL-MCNC: 954 PG/ML
BUN SERPL-MCNC: 16 MG/DL
CALCIUM SERPL-MCNC: 9.5 MG/DL
CHLORIDE SERPL-SCNC: 95 MMOL/L
CO2 SERPL-SCNC: 26 MMOL/L
CREAT SERPL-MCNC: 1.2 MG/DL
CRP SERPL-MCNC: 6.7 MG/L
DIFFERENTIAL METHOD: ABNORMAL
EOSINOPHIL # BLD AUTO: 0.1 K/UL
EOSINOPHIL NFR BLD: 2 %
ERYTHROCYTE [DISTWIDTH] IN BLOOD BY AUTOMATED COUNT: 16 %
EST. GFR  (AFRICAN AMERICAN): >60 ML/MIN/1.73 M^2
EST. GFR  (NON AFRICAN AMERICAN): >60 ML/MIN/1.73 M^2
GLUCOSE SERPL-MCNC: 80 MG/DL
HCT VFR BLD AUTO: 29 %
HGB BLD-MCNC: 9.6 G/DL
IMM GRANULOCYTES # BLD AUTO: 0.02 K/UL
IMM GRANULOCYTES NFR BLD AUTO: 0.4 %
INR PPP: 2.5
LDH SERPL L TO P-CCNC: 185 U/L
LYMPHOCYTES # BLD AUTO: 1.4 K/UL
LYMPHOCYTES NFR BLD: 25.1 %
MCH RBC QN AUTO: 26.9 PG
MCHC RBC AUTO-ENTMCNC: 33.1 G/DL
MCV RBC AUTO: 81 FL
MONOCYTES # BLD AUTO: 0.4 K/UL
MONOCYTES NFR BLD: 7.9 %
NEUTROPHILS # BLD AUTO: 3.6 K/UL
NEUTROPHILS NFR BLD: 63.9 %
NRBC BLD-RTO: 0 /100 WBC
PHOSPHATE SERPL-MCNC: 2.9 MG/DL
PLATELET # BLD AUTO: 243 K/UL
PMV BLD AUTO: 10.3 FL
POTASSIUM SERPL-SCNC: 3.5 MMOL/L
PREALB SERPL-MCNC: 20 MG/DL
PROT SERPL-MCNC: 7.2 G/DL
PROTHROMBIN TIME: 24.7 SEC
RBC # BLD AUTO: 3.57 M/UL
SODIUM SERPL-SCNC: 131 MMOL/L
WBC # BLD AUTO: 5.58 K/UL

## 2017-10-26 PROCEDURE — 93750 INTERROGATION VAD IN PERSON: CPT | Mod: PBBFAC | Performed by: INTERNAL MEDICINE

## 2017-10-26 PROCEDURE — 99999 PR PBB SHADOW E&M-EST. PATIENT-LVL III: CPT | Mod: PBBFAC,,,

## 2017-10-26 PROCEDURE — 99213 OFFICE O/P EST LOW 20 MIN: CPT | Mod: PBBFAC

## 2017-10-26 PROCEDURE — 80053 COMPREHEN METABOLIC PANEL: CPT

## 2017-10-26 PROCEDURE — 97802 MEDICAL NUTRITION INDIV IN: CPT | Mod: PBBFAC | Performed by: DIETITIAN, REGISTERED

## 2017-10-26 PROCEDURE — 85025 COMPLETE CBC W/AUTO DIFF WBC: CPT

## 2017-10-26 PROCEDURE — 99999 PR PBB SHADOW E&M-EST. PATIENT-LVL III: CPT | Mod: PBBFAC,,, | Performed by: INTERNAL MEDICINE

## 2017-10-26 PROCEDURE — 84100 ASSAY OF PHOSPHORUS: CPT

## 2017-10-26 PROCEDURE — 99213 OFFICE O/P EST LOW 20 MIN: CPT | Mod: PBBFAC,27 | Performed by: INTERNAL MEDICINE

## 2017-10-26 PROCEDURE — 36415 COLL VENOUS BLD VENIPUNCTURE: CPT

## 2017-10-26 PROCEDURE — 82248 BILIRUBIN DIRECT: CPT

## 2017-10-26 PROCEDURE — 85610 PROTHROMBIN TIME: CPT

## 2017-10-26 PROCEDURE — 83880 ASSAY OF NATRIURETIC PEPTIDE: CPT

## 2017-10-26 PROCEDURE — 99214 OFFICE O/P EST MOD 30 MIN: CPT | Mod: S$PBB,,, | Performed by: INTERNAL MEDICINE

## 2017-10-26 PROCEDURE — 86140 C-REACTIVE PROTEIN: CPT

## 2017-10-26 PROCEDURE — 84134 ASSAY OF PREALBUMIN: CPT

## 2017-10-26 PROCEDURE — 83615 LACTATE (LD) (LDH) ENZYME: CPT

## 2017-10-26 RX ORDER — HYDRALAZINE HYDROCHLORIDE 25 MG/1
75 TABLET, FILM COATED ORAL EVERY 8 HOURS
Qty: 270 TABLET | Refills: 11 | Status: SHIPPED | OUTPATIENT
Start: 2017-10-26 | End: 2017-10-26 | Stop reason: SDUPTHER

## 2017-10-26 RX ORDER — POTASSIUM CHLORIDE 20 MEQ/1
40 TABLET, EXTENDED RELEASE ORAL 2 TIMES DAILY
Qty: 60 TABLET | Refills: 11 | Status: SHIPPED | OUTPATIENT
Start: 2017-10-26 | End: 2017-01-01 | Stop reason: SDUPTHER

## 2017-10-26 RX ORDER — SPIRONOLACTONE 25 MG/1
12.5 TABLET ORAL DAILY
Qty: 15 TABLET | Refills: 11 | Status: SHIPPED | OUTPATIENT
Start: 2017-10-26 | End: 2017-10-26

## 2017-10-26 RX ORDER — PRAVASTATIN SODIUM 20 MG/1
20 TABLET ORAL NIGHTLY
Qty: 30 TABLET | Refills: 11 | Status: SHIPPED | OUTPATIENT
Start: 2017-10-26

## 2017-10-26 RX ORDER — WARFARIN 2 MG/1
2 TABLET ORAL DAILY
Qty: 30 TABLET | Refills: 11 | Status: ON HOLD | OUTPATIENT
Start: 2017-10-26 | End: 2017-11-22

## 2017-10-26 RX ORDER — PANTOPRAZOLE SODIUM 40 MG/1
40 TABLET, DELAYED RELEASE ORAL DAILY
Qty: 30 TABLET | Refills: 11 | Status: ON HOLD | OUTPATIENT
Start: 2017-10-26 | End: 2017-01-01 | Stop reason: ALTCHOICE

## 2017-10-26 RX ORDER — AMLODIPINE BESYLATE 10 MG/1
10 TABLET ORAL DAILY
Qty: 30 TABLET | Refills: 11 | Status: SHIPPED | OUTPATIENT
Start: 2017-10-26 | End: 2017-01-01 | Stop reason: SDUPTHER

## 2017-10-26 RX ORDER — DRONABINOL 5 MG/1
5 CAPSULE ORAL
Qty: 60 CAPSULE | Refills: 5 | Status: ON HOLD | OUTPATIENT
Start: 2017-10-26 | End: 2017-12-01

## 2017-10-26 RX ORDER — ONDANSETRON 4 MG/1
4 TABLET, ORALLY DISINTEGRATING ORAL EVERY 8 HOURS PRN
Qty: 30 TABLET | Refills: 1 | Status: ON HOLD | OUTPATIENT
Start: 2017-10-26 | End: 2017-01-01 | Stop reason: ALTCHOICE

## 2017-10-26 RX ORDER — FERROUS SULFATE 324(65)MG
325 TABLET, DELAYED RELEASE (ENTERIC COATED) ORAL DAILY
Qty: 30 TABLET | Refills: 11 | Status: ON HOLD | OUTPATIENT
Start: 2017-10-26 | End: 2017-11-22

## 2017-10-26 RX ORDER — HYDRALAZINE HYDROCHLORIDE 50 MG/1
50 TABLET, FILM COATED ORAL EVERY 8 HOURS
Qty: 90 TABLET | Refills: 11 | Status: ON HOLD | OUTPATIENT
Start: 2017-10-26 | End: 2018-01-01 | Stop reason: ALTCHOICE

## 2017-10-26 NOTE — PROGRESS NOTES
Wife was called and given lab result, verified correct dose of coumadin, reports appetite has been low, has been having 3 ensures daily and was told by Nutritionist that he can have up to 6 ensures a day--advised wife to contact coumadin clinic if insure is increased, no other changes reported, advised wife of coumadin instructions and next lab draw date, order was faxed to [facility]

## 2017-10-26 NOTE — PATIENT INSTRUCTIONS
1.  Aim for at least 6 meals and snacks each day  2.  Eat fruits to be healthy.  3.  Fix up vegetables to add calories and protein (add cheese sauce, butter, margarine, gravy, oil or salad dressing.     Continue po nutrition supplement until appetite returns and weight stabilizes.  Low sodium diet with fluid restriction per MD order.

## 2017-10-26 NOTE — PROGRESS NOTES
SW followed up with pt and wife today in LVAD clinic. Both alert/oreinted x4. Pt wife appears easily excitable and stressed.    Pt and wife saw dietician today and report they gained a lot of information. Pt states his taste buds and appetite are getting much better. Pt still supplementing with Ensure, which they get from [employer]. Pt reports he has been eating more food and more variety of foods. No need to look into getting supplemental feeds approved by insurance at this time.    Pt and caregiver experiencing expected stress due to pt's long hospital stay and deteriorated condition. Support and education provided. Social work also normalized their experience and feelings. They stated they are in this together and pt wife appeared less stress than upon arrival to clinic today.    Pt states he knows he won't be better overnight, but that it will take time and he is already better from where he was.    No further concerns voiced. SW continuing to follow and remains available.

## 2017-10-26 NOTE — LETTER
October 30, 2017        Joe Ernst  [address] [place] [address]  Phone: [phone]  Fax: [phone]             [facility] [address] [place] [address]  Phone: [phone]   Patient: Suman Hayden   MR Number: [id number]   YOB: 1950   Date of Visit: 10/26/2017       Dear Dr. oJe Ernst    Thank you for referring Suman Hayden to me for evaluation. Attached you will find relevant portions of my assessment and plan of care.    If you have questions, please do not hesitate to call me. I look forward to following Suman Hayden along with you.    Sincerely,    Angie Torres MD    Enclosure    If you would like to receive this communication electronically, please contact [email or web address] or [phone] to request [employer] access.    [employer] is a tool which provides read-only access to select patient information with whom you have a relationship. Its easy to use and provides real time access to review your patients record including encounter summaries, notes, results, and demographic information.    If you feel you have received this communication in error or would no longer like to receive these types of communications, please e-mail [email or web address]

## 2017-10-26 NOTE — PROGRESS NOTES
"Referring Physician:No ref. provider found     Reason for visit:  Chief Complaint   Patient presents with    Congestive Heart Failure    LVAD    Heart Transplant Pre-evaluation    Nutrition Counseling        :1950     Allergies Reviewed  Meds Reviewed    Anthropometrics  Weight:69.2 kg (152 lb 8.9 oz)  Height:5' 8" (1.727 m)  BMI:Body mass index is 23.2 kg/m².   IBW:   70 kg    Meds:  Outpatient Medications Prior to Visit   Medication Sig Dispense Refill    amlodipine (NORVASC) 10 MG tablet Take 1 tablet (10 mg total) by mouth once daily. 30 tablet 11    aspirin 81 MG Chew Take 81 mg by mouth once daily.      DOBUTamine (DOBUTREX) 500 mg/250 mL (2,000 mcg/mL) Inject 199.75 mcg/min into the vein continuous.      dronabinol (MARINOL) 5 MG capsule Take 1 capsule (5 mg total) by mouth 2 (two) times daily before meals. 60 capsule 5    ferrous sulfate 324 mg (65 mg iron) TbEC Take 1 tablet (325 mg total) by mouth once daily. 30 tablet 11    furosemide (LASIX) 80 MG tablet Take 1 tablet (80 mg total) by mouth 2 (two) times daily. 60 tablet 11    hydrALAZINE (APRESOLINE) 25 MG tablet Take 3 tablets (75 mg total) by mouth every 8 (eight) hours. 270 tablet 11    hydrocodone-acetaminophen 5-325mg (NORCO) 5-325 mg per tablet Take 1 tablet by mouth every 6 (six) hours as needed (Acute pain, Z95.811 left ventricular assist device placement). 28 tablet 0    ondansetron (ZOFRAN-ODT) 4 MG TbDL Take 1 tablet (4 mg total) by mouth every 8 (eight) hours as needed. 30 tablet 1    pantoprazole (PROTONIX) 40 MG tablet Take 1 tablet (40 mg total) by mouth once daily. 30 tablet 11    potassium chloride SA (K-DUR,KLOR-CON) 20 MEQ tablet Take 2 tablets (40 mEq total) by mouth 2 (two) times daily. 60 tablet 11    pravastatin (PRAVACHOL) 20 MG tablet Take 20 mg by mouth every evening.      spironolactone (ALDACTONE) 25 MG tablet Take 0.5 tablets (12.5 mg total) by mouth once daily. 15 tablet 11    warfarin (COUMADIN) 2 " MG tablet Take 1 tablet (2 mg total) by mouth Daily. 30 tablet 11     No facility-administered medications prior to visit.          Labs:   In process today;  10/11/17 Alb  3.2   Prealb  25   Phos  3.4   Mg  2.0     Estimated Nutrition Needs:   2100 Kcals/day( 30 kcal/kg IBW),    70 g protein( 1.0 g/kg IBW)     Diet Hx:   Pt & spouse present for encounter.  Pt with recent weight loss/poor appetite 2/2 LVAD placement with extensive hospitalization.  He states he is working on increasing his po intake.  Is drinking 4-5 Ensure High Protein po supplements daily in lieu of meals.  Spouse states she is preparing anything pt requests; pt will then just take a bite or two.  Pt in WC today.      Assessment:   Pt & spouse attentive and asked relevant questions about foods/beverages recommended to increase calorie/protein intake.  Questions answered, and they verbalized understanding of information.  Continued diet adherence expected.    Nutrition Diagnosis:   None at this time.    Recommendations:   1.  Aim for at least 6 meals and snacks each day  2.  Eat fruits to be healthy.  3.  Fix up vegetables to add calories and protein (add cheese sauce, butter, margarine, gravy, oil or salad dressing.     Continue po nutrition supplement until appetite returns and weight stabilizes.  Low sodium diet with fluid restriction per MD order.    Handouts provided & reviewed:  Heart Failure Nutrition Therapy; Heart Healthy Cooking Tips; Heart Healthy Label Reading Tips; Heart Healthy Shopping Tips; Sodium:  Making Sense of Salt; Acceptable Salt-Free Seasoning Blends; Fluid Restriction & You; Low Sodium Recipes; Diet and Warfarin (Coumadin) Therapy - [facility];  Vitamin K and Medications; Vitamin K Content of Foods; List of Foods High in Vitamin K   High Calorie-High Protein Nutrition Therapy; High-Calorie, High-Protein Cooking and Shopping Tips      Consultation Time:30 minutes.    Follow Up:  Pt and spouse provided with dietitian contact number  and advised to call with questions or make future appointment if further intervention needed.

## 2017-10-27 NOTE — PROGRESS NOTES
"Date of Implant with Heartmate 3 LVAD: 17    PATIENT ARRIVED IN CLINIC: wheelchair, pt reports walking at home.  Hesitant to walk in clinic r/t "bad knees"  .  Pt instructed to walk in to clinic next visit.   Accompanied by: wife    Vitals  PAIN: denies on 0-10 pain scale  Is patient currently on medications for pain? no What kind? n/a    VAD Interrogation:  TXP SHERRI INTERROGATIONS 10/26/2017   Type HeartMate3   Flow 4.1   Speed 5200   PI 3.4   Power ([place]) 3.6   LSL 4800   Pulsatility Pulse       Flow in history: 4-6  History [id number]  HCT:   Lab Results   Component Value Date    HCT 29.0 (L) 10/26/2017    HCT 29 (L) 2017         Problems / Issues / Alarms with VAD if any: None noted, occ PI events   Any Equipment Issues: None noted (Refer to  note for complete details)   Emergency Equipment With Patient: yes   VAD Binder With Patient: yes   Reviewed VAD Numbers In Binder: yes  VAD Sounds: HM3 sound Smooth  Manual & Visual Inspection Of Driveline: No kinks or tears noted    LVAD Dressing/DLES:  Appearance Of Driveline: 1   Antibiotics: NO  Velour: no  Frequency of Dressing Changes: daily & soap and water dressing  Stabilization Device In Use: yes, delgado securement device    Modular Cable Connection Intact: No  Pt In Need Of Management Kits?:No  It is medically necessary to have VAD management kits in order to prevent infection or to assist in the healing of an infected DLES.    Assessment:   Complaints/reason for visit today: routine  Complaints Of Nausea / Vomiting: None noted    Appearance and Frequency Of Stools: normal and formed without blood & daily  Color Of Urine: clear/yellow  Coping/Depression/Anxiety: coping okay  Sleep Habits: 6 hrs /night  Sleep Aids: None noted  Showering: No  Activity/Exercise: pt reports walking with PT/OT and on his own with his wife.    Driving: No.    Labs:    Chemistry        Component Value Date/Time     (L) 10/26/2017 1134    K " 3.5 10/26/2017 1134    CL 95 10/26/2017 1134    CO2 26 10/26/2017 1134    BUN 16 10/26/2017 1134    CREATININE 1.2 10/26/2017 1134    GLU 80 10/26/2017 1134        Component Value Date/Time    CALCIUM 9.5 10/26/2017 1134    ALKPHOS 74 10/26/2017 1134    AST 18 10/26/2017 1134    ALT 11 10/26/2017 1134    BILITOT 1.0 10/26/2017 1134    ESTGFRAFRICA >60.0 10/26/2017 1134    EGFRNONAA >60.0 10/26/2017 1134            Magnesium   Date Value Ref Range Status   10/11/2017 2.0 1.6 - 2.6 mg/dL Final       Lab Results   Component Value Date    WBC 5.58 10/26/2017    HGB 9.6 (L) 10/26/2017    HCT 29.0 (L) 10/26/2017    MCV 81 (L) 10/26/2017     10/26/2017       Lab Results   Component Value Date    INR 2.5 (H) 10/26/2017    INR 2.1 10/23/2017    INR 2.8 10/16/2017       BNP   Date Value Ref Range Status   10/26/2017 954 (H) 0 - 99 pg/mL Final     Comment:     Values of less than 100 pg/ml are consistent with non-CHF populations.   10/11/2017 852 (H) 0 - 99 pg/mL Final     Comment:     Values of less than 100 pg/ml are consistent with non-CHF populations.   10/04/2017 1,431 (H) 0 - 99 pg/mL Final     Comment:     Values of less than 100 pg/ml are consistent with non-CHF populations.       LD   Date Value Ref Range Status   10/26/2017 185 110 - 260 U/L Final     Comment:     Results are increased in hemolyzed samples.   10/11/2017 197 110 - 260 U/L Final     Comment:     Results are increased in hemolyzed samples.   10/04/2017 245 110 - 260 U/L Final     Comment:     Results are increased in hemolyzed samples.       Labs reviewed with patient: YES      Patient Satisfaction Survey completed per patient: no  (explained about signature and box to check)  Medication reconciliation: per MA.  Purple card updated today: yes  [facility],     Education: Reviewed driveline care, emergency procedures, how to change the controller, alarms with patient, as well as discussed how to page the VAD  coordinator in case of an emergency.      Plans/Needs:  Pt doing very well.  Saw nutrition today and recommended to  Increase ensure number daily.  Instructed pt to inform coumadin clinic as well. Pt BP and doppler low with MAP in 60s.  Pt had stopped aldactone r/t nausea.  PT hydralazine decreased to 50 mg.  Potassium slightly low today but increased ensure should help.  Will recheck labs on Monday and pt to RTC in 2 weeks.  Please refer to MD note.       Hurricane Season: No

## 2017-10-30 ENCOUNTER — ANTI-COAG VISIT (OUTPATIENT)
Dept: CARDIOLOGY | Facility: CLINIC | Age: 67
End: 2017-10-30

## 2017-10-30 DIAGNOSIS — Z95.811 LVAD (LEFT VENTRICULAR ASSIST DEVICE) PRESENT: ICD-10-CM

## 2017-10-30 DIAGNOSIS — Z79.01 ANTICOAGULATION MONITORING, INR RANGE 2-3: ICD-10-CM

## 2017-10-30 PROBLEM — L89.153 STAGE III PRESSURE ULCER OF SACRAL REGION: Status: RESOLVED | Noted: 2017-08-30 | Resolved: 2017-10-30

## 2017-10-30 LAB — INR PPP: 2.1

## 2017-10-31 NOTE — PROCEDURES
TXP SHERRI INTERROGATIONS 10/26/2017 10/11/2017 10/4/2017 9/27/2017 9/22/2017 9/20/2017 9/20/2017   Type HeartMate3 HeartMate3 HeartMate3 HeartMate3 - - -   Flow 4.1 3.7 4.1 4.1 - - -   Speed 5200 5200 5200 5200 - - -   PI 3.4 6.5 3.5 3.5 - - -   Power ([place]) 3.6 3.7 3.6 3.6 - - -   LSL 4800 4800 4800 4800 - - -   Pulsatility Pulse Pulse Pulse Pulse Pulse Pulse Pulse   }

## 2017-10-31 NOTE — PROGRESS NOTES
Subjective:   Patient ID:  Suman Hayden is a 67 y.o. male who presents for LVAD followup visit.      Implant Date:7/27/17     3 RPM: 5200  [id number]     INR goal: 2-3   Bridge with Heparin   Antiplatelets: ASA 81     TXP SHERRI INTERROGATIONS 10/26/2017   Type HeartMate3   Flow 4.1   Speed 5200   PI 3.4   Power ([place]) 3.6   LSL 4800   Pulsatility Pulse       HPI   Mr. Hayden is a very pleasant 67 y.o. gentleman with PMH of NICM (EF 10%) on home  at 5 mcg/kg/min, esophagitis, HTN, HLD, s/p [employer] CRT-D who underwent LVAD placement HeartMate 3 Implanted 7/27/2017 as DT. He then had very complcated post op course including delayed chest closure (7/28/17) and extubated (7/29/17), reintubated 8/9/17 and extubated 8/13/17. He was transferred to the floor on  gtt and that evening was moved back to SICU after he suffered a cardiac arrest. He was reintubated and placed on multiple pressors. He suffered a GI bleed at that time and GI was consulted and he was transfused. His pressor requirements decreased and he was extubated; however, he then developed an ileus requiring NG tube placement for decompression and NPO. He was severly debilitated and PT was consulted. Once on  gtt alone he was transferred back to the floor and aggressive PT done. He was resumed on low dose aspirin and coumadin for a goal INR 2-3 and had no further GI bleeding. Once stable for home discharge, he was sent home for a follow up in one week as well as home health PT. He is still on  2.5 mcg/kg/min IV for RV failure and plan is to be weaned as an outpatient.    Patient has been improving slowly however not getting appetite back as quickly as they would like despite marinol. Wife states he has not had N/V but has had trouble getting to eating. Is taking ensure, was seen by dietary who stated he could increase his ensure intake to 6 cans now however.   Came in wheelchair but wife states he is walking around at home more. Patient states he is  "going to walk into clinic next visit.     Review of Systems   Constitution: Positive for malaise/fatigue. Negative for chills, decreased appetite, diaphoresis, fever, weakness, night sweats, weight gain and weight loss.   HENT: Negative for congestion.    Eyes: Negative.  Negative for blurred vision and visual disturbance.   Cardiovascular: Positive for dyspnea on exertion. Negative for chest pain, claudication, cyanosis, irregular heartbeat, leg swelling, near-syncope, orthopnea, palpitations, paroxysmal nocturnal dyspnea and syncope.   Respiratory: Negative for cough, hemoptysis, shortness of breath, sleep disturbances due to breathing, snoring and wheezing.    Endocrine: Negative.    Hematologic/Lymphatic: Negative.  Negative for bleeding problem. Does not bruise/bleed easily.   Skin: Negative for color change, dry skin, nail changes, poor wound healing and rash.   Musculoskeletal: Negative.  Negative for arthritis, joint pain and muscle weakness.   Gastrointestinal: Positive for anorexia and nausea. Negative for bloating, abdominal pain, constipation, diarrhea, hematemesis, hematochezia, melena and vomiting.   Genitourinary: Negative.  Negative for frequency and urgency.   Neurological: Negative for difficulty with concentration, excessive daytime sleepiness, dizziness, headaches and light-headedness.   Psychiatric/Behavioral: Negative for depression. The patient does not have insomnia and is not nervous/anxious.        Objective:   Blood pressure (!) 64/0, pulse 63, temperature 97.8 °F (36.6 °C), temperature source Oral, height 5' 8" (1.727 m), weight 64.9 kg (143 lb).body mass index is 21.74 kg/m².    Physical Exam   Constitutional: He appears well-developed.   HENT:   Head: Normocephalic.   Neck: No JVD present. Carotid bruit is not present.   Cardiovascular: Regular rhythm and normal heart sounds.    No murmur heard.  Smooth VAD hum. DLES is "1"   Pulmonary/Chest: Effort normal and breath sounds normal. No " respiratory distress. He has no wheezes. He has no rales.   Abdominal: Soft. Bowel sounds are normal. He exhibits no distension. There is no tenderness. There is no rebound.   Musculoskeletal: He exhibits no edema.   Neurological: He is alert.   Skin: Skin is warm.   Nursing note and vitals reviewed.      Lab Results   Component Value Date    WBC 5.58 10/26/2017    HGB 9.6 (L) 10/26/2017    HCT 29.0 (L) 10/26/2017    MCV 81 (L) 10/26/2017     10/26/2017    CO2 26 10/26/2017    CREATININE 1.2 10/26/2017    CALCIUM 9.5 10/26/2017    ALBUMIN 3.2 (L) 10/26/2017    AST 18 10/26/2017     (H) 10/26/2017    ALT 11 10/26/2017     10/26/2017       Lab Results   Component Value Date    INR 2.5 (H) 10/26/2017    INR 2.1 10/23/2017    INR 2.8 10/16/2017       BNP   Date Value Ref Range Status   10/26/2017 954 (H) 0 - 99 pg/mL Final     Comment:     Values of less than 100 pg/ml are consistent with non-CHF populations.   10/11/2017 852 (H) 0 - 99 pg/mL Final     Comment:     Values of less than 100 pg/ml are consistent with non-CHF populations.   10/04/2017 1,431 (H) 0 - 99 pg/mL Final     Comment:     Values of less than 100 pg/ml are consistent with non-CHF populations.       LD   Date Value Ref Range Status   10/26/2017 185 110 - 260 U/L Final     Comment:     Results are increased in hemolyzed samples.   10/11/2017 197 110 - 260 U/L Final     Comment:     Results are increased in hemolyzed samples.   10/04/2017 245 110 - 260 U/L Final     Comment:     Results are increased in hemolyzed samples.     Assessment:      1. Chronic systolic congestive heart failure    2. Heart replaced by heart assist device    3. LVAD (left ventricular assist device) present    4. Gastroesophageal reflux disease without esophagitis    5. Essential hypertension    6. Nausea    7. Anticoagulation monitoring, INR range 2-3        Plan:   We will decrease hydralazine to 50mg po TID, since blood pressure seems decreased  significantly today and patient's appetite still not great, anticipate it is a true representation of his BP at home as well.   Will return in 2 weeks to see what blood pressure is running.    Recommend 2 gram sodium restriction and 1500cc fluid restriction.  Encourage physical activity with graded exercise program.  Requested patient to weigh themselves daily, and to notify us if their weight increases by more than 3 lbs in 1 day or 5 lbs in 1 week.     Listed for transplant: No, DT    UNOS Patient Status  Functional Status: 50% - Requires considerable assistance and frequent medical care  Physical Capacity: Limited Mobility  Working for Income: Unknown    Mohit Ambrosio MD

## 2017-11-01 ENCOUNTER — ANTI-COAG VISIT (OUTPATIENT)
Dept: CARDIOLOGY | Facility: CLINIC | Age: 67
End: 2017-11-01

## 2017-11-01 ENCOUNTER — TELEPHONE (OUTPATIENT)
Dept: TRANSPLANT | Facility: CLINIC | Age: 67
End: 2017-11-01

## 2017-11-01 DIAGNOSIS — Z79.01 ANTICOAGULATION MONITORING, INR RANGE 2-3: ICD-10-CM

## 2017-11-01 DIAGNOSIS — Z95.811 LVAD (LEFT VENTRICULAR ASSIST DEVICE) PRESENT: ICD-10-CM

## 2017-11-01 LAB — INR PPP: 1.9

## 2017-11-01 NOTE — TELEPHONE ENCOUNTER
Lab results.  The bun/cr and bnp  has improved from 10/26 but is higher than 10/11.  The patient does not answer either phone and there is no voicemail.      10/11  10/26  11/1  Na/k  136/4.4 131/3.5 138/3.7  Bun/cr  27/1.5  16/1.2  14/1.09  bnp  852  954  883

## 2017-11-02 ENCOUNTER — TELEPHONE (OUTPATIENT)
Dept: TRANSPLANT | Facility: CLINIC | Age: 67
End: 2017-11-02

## 2017-11-02 NOTE — TELEPHONE ENCOUNTER
Phone call.  Called both phone and left a message on the mobile phone regarding the labs.  appt is 11/9.

## 2017-11-02 NOTE — PROGRESS NOTES
Wife questioned and confirmed correct dose .  Reports patient eating mustard greens and jalapeno cornbread / cream cheese left overs from Saturday .  No other changes reported.  Wife reports patient is unable to have labs brawn 3 three times a week.

## 2017-11-06 ENCOUNTER — ANTI-COAG VISIT (OUTPATIENT)
Dept: CARDIOLOGY | Facility: CLINIC | Age: 67
End: 2017-11-06

## 2017-11-06 DIAGNOSIS — Z95.811 LVAD (LEFT VENTRICULAR ASSIST DEVICE) PRESENT: ICD-10-CM

## 2017-11-06 DIAGNOSIS — Z79.01 ANTICOAGULATION MONITORING, INR RANGE 2-3: ICD-10-CM

## 2017-11-06 LAB — INR PPP: 1.7

## 2017-11-07 ENCOUNTER — TELEPHONE (OUTPATIENT)
Dept: TRANSPLANT | Facility: CLINIC | Age: 67
End: 2017-11-07

## 2017-11-07 NOTE — TELEPHONE ENCOUNTER
Lab results.  The labs are similar to 10/26.  The bun/cr is normal.  I spoke with the wife.  He is doing well.  His next appt is tomorrow 11.8.  She will call coumadin clinic re dosing of coumadin.  I gave her the phone number.      10/26  11/6  Na/k  131/3.5 138/3.5  Bun/cr  16/1.2  11/0.87  H&H  9.6/29.0 10.2/33.3

## 2017-11-07 NOTE — PROGRESS NOTES
Dr. Leos is aware of the plan and we will not admit since his INR was yesterday and he will be seen in clinic tomorrow. Will boost today.

## 2017-11-07 NOTE — PROGRESS NOTES
Wife questioned and reports incorrect dose  and 3 mg on tues and of 2 mg all other days  Patient taken 2 mg on 11/2 and not 3 mg as advised.  Ensure x3 daily.  No other changes reported.

## 2017-11-08 ENCOUNTER — CLINICAL SUPPORT (OUTPATIENT)
Dept: TRANSPLANT | Facility: CLINIC | Age: 67
End: 2017-11-08
Payer: MEDICARE

## 2017-11-08 ENCOUNTER — LAB VISIT (OUTPATIENT)
Dept: LAB | Facility: HOSPITAL | Age: 67
End: 2017-11-08
Attending: INTERNAL MEDICINE
Payer: MEDICARE

## 2017-11-08 ENCOUNTER — OFFICE VISIT (OUTPATIENT)
Dept: TRANSPLANT | Facility: CLINIC | Age: 67
End: 2017-11-08
Attending: INTERNAL MEDICINE
Payer: MEDICARE

## 2017-11-08 ENCOUNTER — ANTI-COAG VISIT (OUTPATIENT)
Dept: CARDIOLOGY | Facility: CLINIC | Age: 67
End: 2017-11-08

## 2017-11-08 VITALS — BODY MASS INDEX: 22.28 KG/M2 | HEIGHT: 68 IN | TEMPERATURE: 99 F | WEIGHT: 147 LBS | SYSTOLIC BLOOD PRESSURE: 78 MMHG

## 2017-11-08 DIAGNOSIS — Z95.811 HEART REPLACED BY HEART ASSIST DEVICE: Primary | ICD-10-CM

## 2017-11-08 DIAGNOSIS — Z95.811 LVAD (LEFT VENTRICULAR ASSIST DEVICE) PRESENT: ICD-10-CM

## 2017-11-08 DIAGNOSIS — I50.22 CHRONIC SYSTOLIC CONGESTIVE HEART FAILURE: ICD-10-CM

## 2017-11-08 DIAGNOSIS — Z79.01 ANTICOAGULATION MONITORING, INR RANGE 2-3: ICD-10-CM

## 2017-11-08 DIAGNOSIS — Z95.811 HEART REPLACED BY HEART ASSIST DEVICE: ICD-10-CM

## 2017-11-08 DIAGNOSIS — I42.8 NONISCHEMIC CARDIOMYOPATHY: ICD-10-CM

## 2017-11-08 DIAGNOSIS — E87.6 HYPOKALEMIA: ICD-10-CM

## 2017-11-08 DIAGNOSIS — I10 ESSENTIAL HYPERTENSION: ICD-10-CM

## 2017-11-08 LAB
ALBUMIN SERPL BCP-MCNC: 3.1 G/DL
ALP SERPL-CCNC: 66 U/L
ALT SERPL W/O P-5'-P-CCNC: 9 U/L
ANION GAP SERPL CALC-SCNC: 10 MMOL/L
AST SERPL-CCNC: 17 U/L
BASOPHILS # BLD AUTO: 0.05 K/UL
BASOPHILS NFR BLD: 1.2 %
BILIRUB DIRECT SERPL-MCNC: 0.4 MG/DL
BILIRUB SERPL-MCNC: 0.7 MG/DL
BNP SERPL-MCNC: 1038 PG/ML
BUN SERPL-MCNC: 12 MG/DL
CALCIUM SERPL-MCNC: 9.6 MG/DL
CHLORIDE SERPL-SCNC: 98 MMOL/L
CO2 SERPL-SCNC: 28 MMOL/L
CREAT SERPL-MCNC: 1.1 MG/DL
CRP SERPL-MCNC: 5.3 MG/L
DIFFERENTIAL METHOD: ABNORMAL
EOSINOPHIL # BLD AUTO: 0.3 K/UL
EOSINOPHIL NFR BLD: 6.5 %
ERYTHROCYTE [DISTWIDTH] IN BLOOD BY AUTOMATED COUNT: 15.9 %
EST. GFR  (AFRICAN AMERICAN): >60 ML/MIN/1.73 M^2
EST. GFR  (NON AFRICAN AMERICAN): >60 ML/MIN/1.73 M^2
GLUCOSE SERPL-MCNC: 74 MG/DL
HCT VFR BLD AUTO: 28.6 %
HGB BLD-MCNC: 9.1 G/DL
IMM GRANULOCYTES # BLD AUTO: 0.01 K/UL
IMM GRANULOCYTES NFR BLD AUTO: 0.2 %
INR PPP: 2.2
LDH SERPL L TO P-CCNC: 174 U/L
LYMPHOCYTES # BLD AUTO: 1.2 K/UL
LYMPHOCYTES NFR BLD: 28.9 %
MAGNESIUM SERPL-MCNC: 1.6 MG/DL
MCH RBC QN AUTO: 25.9 PG
MCHC RBC AUTO-ENTMCNC: 31.8 G/DL
MCV RBC AUTO: 82 FL
MONOCYTES # BLD AUTO: 0.5 K/UL
MONOCYTES NFR BLD: 11 %
NEUTROPHILS # BLD AUTO: 2.2 K/UL
NEUTROPHILS NFR BLD: 52.2 %
NRBC BLD-RTO: 0 /100 WBC
PHOSPHATE SERPL-MCNC: 3.1 MG/DL
PLATELET # BLD AUTO: 233 K/UL
PMV BLD AUTO: 11.1 FL
POTASSIUM SERPL-SCNC: 3.4 MMOL/L
PREALB SERPL-MCNC: 18 MG/DL
PROT SERPL-MCNC: 7.3 G/DL
PROTHROMBIN TIME: 22 SEC
RBC # BLD AUTO: 3.51 M/UL
SODIUM SERPL-SCNC: 136 MMOL/L
WBC # BLD AUTO: 4.29 K/UL

## 2017-11-08 PROCEDURE — 83880 ASSAY OF NATRIURETIC PEPTIDE: CPT

## 2017-11-08 PROCEDURE — 93750 INTERROGATION VAD IN PERSON: CPT | Mod: ,,, | Performed by: INTERNAL MEDICINE

## 2017-11-08 PROCEDURE — 82248 BILIRUBIN DIRECT: CPT

## 2017-11-08 PROCEDURE — 80053 COMPREHEN METABOLIC PANEL: CPT

## 2017-11-08 PROCEDURE — 84100 ASSAY OF PHOSPHORUS: CPT

## 2017-11-08 PROCEDURE — 99999 PR PBB SHADOW E&M-EST. PATIENT-LVL III: CPT | Mod: PBBFAC,,, | Performed by: INTERNAL MEDICINE

## 2017-11-08 PROCEDURE — 83735 ASSAY OF MAGNESIUM: CPT

## 2017-11-08 PROCEDURE — 36415 COLL VENOUS BLD VENIPUNCTURE: CPT

## 2017-11-08 PROCEDURE — 99213 OFFICE O/P EST LOW 20 MIN: CPT | Mod: PBBFAC | Performed by: INTERNAL MEDICINE

## 2017-11-08 PROCEDURE — 85610 PROTHROMBIN TIME: CPT

## 2017-11-08 PROCEDURE — 84134 ASSAY OF PREALBUMIN: CPT

## 2017-11-08 PROCEDURE — 86140 C-REACTIVE PROTEIN: CPT

## 2017-11-08 PROCEDURE — 83615 LACTATE (LD) (LDH) ENZYME: CPT

## 2017-11-08 PROCEDURE — 85025 COMPLETE CBC W/AUTO DIFF WBC: CPT

## 2017-11-08 PROCEDURE — 99214 OFFICE O/P EST MOD 30 MIN: CPT | Mod: S$PBB,,, | Performed by: INTERNAL MEDICINE

## 2017-11-08 RX ORDER — HYDRALAZINE HYDROCHLORIDE 25 MG/1
TABLET, FILM COATED ORAL
COMMUNITY
Start: 2017-11-03 | End: 2017-11-08 | Stop reason: SDUPTHER

## 2017-11-08 RX ORDER — SPIRONOLACTONE 25 MG/1
25 TABLET ORAL DAILY
Qty: 30 TABLET | Refills: 11 | Status: ON HOLD | OUTPATIENT
Start: 2017-11-08 | End: 2017-11-22

## 2017-11-08 RX ORDER — POLYETHYLENE GLYCOL 3350 17 G/17G
POWDER, FOR SOLUTION ORAL
Status: ON HOLD | COMMUNITY
Start: 2017-10-01 | End: 2017-12-01 | Stop reason: HOSPADM

## 2017-11-08 NOTE — PROCEDURES
TXP SHERRI INTERROGATIONS 11/8/2017 10/26/2017 10/11/2017 10/4/2017 9/27/2017 9/22/2017 9/20/2017   Type HeartMate3 HeartMate3 HeartMate3 HeartMate3 HeartMate3 - -   Flow 4.1 4.1 3.7 4.1 4.1 - -   Speed 5200 5200 5200 5200 5200 - -   PI 3.7 3.4 6.5 3.5 3.5 - -   Power ([place]) 3.7 3.6 3.7 3.6 3.6 - -   LSL 4800 4800 4800 4800 4800 - -   Pulsatility Intermittent pulse Pulse Pulse Pulse Pulse Pulse Pulse   }

## 2017-11-08 NOTE — LETTER
November 8, 2017        Joe Ernst  [address] [place] [address]  Phone: [phone]  Fax: [phone]             [facility] [address] [place] [address]  Phone: [phone]   Patient: Suman Hayden   MR Number: [id number]   YOB: 1950   Date of Visit: 11/8/2017       Dear Dr. Joe Ernst    Thank you for referring Suman Hayden to me for evaluation. Attached you will find relevant portions of my assessment and plan of care.    If you have questions, please do not hesitate to call me. I look forward to following Suman Hayden along with you.    Sincerely,    Deejay Duff Jr, MD    Enclosure    If you would like to receive this communication electronically, please contact [email or web address] or [phone] to request [employer] access.    [employer] is a tool which provides read-only access to select patient information with whom you have a relationship. Its easy to use and provides real time access to review your patients record including encounter summaries, notes, results, and demographic information.    If you feel you have received this communication in error or would no longer like to receive these types of communications, please e-mail [email or web address]

## 2017-11-08 NOTE — PROGRESS NOTES
Date of Implant with Heartmate 3 LVAD: 17    PATIENT ARRIVED IN CLINIC:  Ambulatory   Accompanied by: wife    Vitals  Temperature, oral: 99.1  PAIN: 0 on 0-10 pain scale, location of pain: na, description of pain: na  Is patient currently on medications for pain? no What kind? na  Does this help relieve the pain? na    VAD Interrogation:  TXP SHERRI INTERROGATIONS 2017   Type HeartMate3   Flow 4.1   Speed 5200   PI 3.7   Power ([place]) 3.7   LSL 4800   Pulsatility Intermittent pulse       Flow in history: 4's  History [id number]  HCT:   Lab Results   Component Value Date    HCT 28.6 (L) 2017    HCT 29 (L) 2017         Problems / Issues / Alarms with VAD if any: None noted  Any Equipment Issues: None noted (Refer to  note for complete details)   Emergency Equipment With Patient: yes   VAD Binder With Patient: yes   Reviewed VAD Numbers In Binder: yes  VAD Sounds: HM3 sound Smooth  Manual & Visual Inspection Of Driveline: No kinks or tears noted    LVAD Dressing/DLES:  Appearance Of Driveline: 1 still not incorporated. Line can still move.  Antibiotics: NO  Velour: no  Frequency of Dressing Changes: daily & soap and water dressing  Stabilization Device In Use: yes, delgado securement device. However,It was not clamped tight enough. I tightened it and educated pt and wife on stabilizing a little better will enhance healing process.      Modular Cable Connection Intact: No yellow exposed  Pt In Need Of Management Kits?:Yes - 1 box soap and water ordered.  It is medically necessary to have VAD management kits in order to prevent infection or to assist in the healing of an infected DLES.    Assessment:   Complaints/reason for visit today: routine  Complaints Of Nausea / Vomiting: had c/o n/v while taking iron tablet. s    Appearance and Frequency Of Stools: normal and formed without blood & daily  Color Of Urine: clear/yellow  Coping/Depression/Anxiety: coping okay  Sleep Habits: 8  hrs /night  Sleep Aids: None noted  Showering: No  Activity/Exercise: walking daily. Has PT weekly   Driving: No.    Labs:    Chemistry        Component Value Date/Time     11/08/2017 0926    K 3.4 (L) 11/08/2017 0926    CL 98 11/08/2017 0926    CO2 28 11/08/2017 0926    BUN 12 11/08/2017 0926    CREATININE 1.1 11/08/2017 0926    GLU 74 11/08/2017 0926        Component Value Date/Time    CALCIUM 9.6 11/08/2017 0926    ALKPHOS 66 11/08/2017 0926    AST 17 11/08/2017 0926    ALT 9 (L) 11/08/2017 0926    BILITOT 0.7 11/08/2017 0926    ESTGFRAFRICA >60.0 11/08/2017 0926    EGFRNONAA >60.0 11/08/2017 0926            Magnesium   Date Value Ref Range Status   11/08/2017 1.6 1.6 - 2.6 mg/dL Final       Lab Results   Component Value Date    WBC 4.29 11/08/2017    HGB 9.1 (L) 11/08/2017    HCT 28.6 (L) 11/08/2017    MCV 82 11/08/2017     11/08/2017       Lab Results   Component Value Date    INR 2.2 (H) 11/08/2017    INR 1.7 11/06/2017    INR 1.9 11/01/2017       BNP   Date Value Ref Range Status   11/08/2017 1,038 (H) 0 - 99 pg/mL Final     Comment:     Values of less than 100 pg/ml are consistent with non-CHF populations.   10/26/2017 954 (H) 0 - 99 pg/mL Final     Comment:     Values of less than 100 pg/ml are consistent with non-CHF populations.   10/11/2017 852 (H) 0 - 99 pg/mL Final     Comment:     Values of less than 100 pg/ml are consistent with non-CHF populations.       LD   Date Value Ref Range Status   11/08/2017 174 110 - 260 U/L Final     Comment:     Results are increased in hemolyzed samples.   10/26/2017 185 110 - 260 U/L Final     Comment:     Results are increased in hemolyzed samples.   10/11/2017 197 110 - 260 U/L Final     Comment:     Results are increased in hemolyzed samples.       Labs reviewed with patient: YES      Patient Satisfaction Survey completed per patient: no  (explained about signature and box to check)  Medication reconciliation: per MA.  Purple card updated today:  yes  [facility]mad[facility], 2.2    Education: Reviewed driveline care, emergency procedures, how to change the controller, alarms with patient, as well as discussed how to page the VAD coordinator in case of an emergency.      Plans/Needs: Arrived ambulatory accompanied by Mrs Hayden. Pt. Looking very good and feeling good. Only complaint was n/v while taking iron tab. Pt stopped taking it last week and n/v resolved immediately. Pt currently on dobutamine IV and has life vest.     Plan per Dr Duff:  Admit to hospital sometime next week after planning  with Dr Zee with EP to schedule ICD revision and while admitted we'll try to wean dobutamine drip off.      Start aldactone 25mg 1x day for hypokalemia(K+ 3.4), added to purple card.     BMP added to next INR lab to check K+ level.     RTC 1month     Hurricane Season: No

## 2017-11-08 NOTE — PROGRESS NOTES
Subjective:   Patient ID:  Suman Hayden is a 67 y.o. male who presents for LVAD followup visit.    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  He underwent HM 3 implan 7/27/17 and post-op sent home on  currently at 200 mcg/min (3 mcg/kg/min).  Post-op course was complicated as follows:   Delayed chest closure (7/28/17)   Extubated (7/29/17) but reintubated 8/9/17 and extubated 8/13/17. He was transferred to  Cardiac arrest post LVAD reintubated and placed on multiple pressors   Acute GI bleed at that time and GI was consulted and he was transfused.    Ileus requiring NG tube placement for decompression and NPO   Severe debility and PT was consulted   RV failure and still on      Since last visit wife reports that he can climb 20 steps up and down during the day, participating with PT and able to dress and bathe himself.  Appetite is improving, still on marinol 5 mg BID.  He walked from car to clinic without stopping today.  Self reported ROS below    Review of Systems   Constitution: Positive for decreased appetite (but stable and possibly improving) and weakness (improving). Negative for chills, fever, malaise/fatigue, night sweats, weight gain and weight loss.   HENT: Negative for congestion, hearing loss, hoarse voice, nosebleeds and sore throat.    Eyes: Negative for double vision, pain, vision loss in left eye and vision loss in right eye.   Cardiovascular: Negative for chest pain, claudication, dyspnea on exertion, irregular heartbeat, leg swelling, near-syncope, orthopnea, palpitations, paroxysmal nocturnal dyspnea and syncope.   Respiratory: Negative for cough, hemoptysis,  "shortness of breath, sleep disturbances due to breathing, snoring, sputum production and wheezing.    Endocrine: Positive for cold intolerance. Negative for heat intolerance, polydipsia and polyuria.   Hematologic/Lymphatic: Negative for bleeding problem. Does not bruise/bleed easily.   Musculoskeletal: Negative for falls, muscle cramps, myalgias and neck pain.   Gastrointestinal: Positive for nausea (sometimes). Negative for bloating, abdominal pain, change in bowel habit, constipation, diarrhea, hematochezia, jaundice and melena.   Genitourinary: Negative for bladder incontinence, dysuria, frequency, hematuria, hesitancy, incomplete emptying and urgency.   Neurological: Negative for brief paralysis, dizziness, focal weakness, headaches, numbness, paresthesias and seizures.   Psychiatric/Behavioral: Negative for depression and memory loss. The patient is not nervous/anxious.      Objective:   Blood pressure (!) 78/0, temperature 99.1 °F (37.3 °C), height 5' 8" (1.727 m), weight 66.7 kg (147 lb).body mass index is 22.35 kg/m².  Doppler: 78 mm Hg  Physical Exam   Constitutional: No distress.   BP (!) 78/0   Temp 99.1 °F (37.3 °C)   Ht 5' 8" (1.727 m)   Wt 66.7 kg (147 lb)   BMI 22.35 kg/m²   Thin AAM in NAD   HENT:   Head: Normocephalic and atraumatic.   Eyes: Conjunctivae are normal. No scleral icterus.   Neck: No JVD present. No thyromegaly present.   Cardiovascular:   Normal VAD sounds; DL 1   Pulmonary/Chest: Effort normal and breath sounds normal. No respiratory distress. He has no wheezes. He has no rales.   Abdominal: Soft. Bowel sounds are normal. He exhibits no distension and no mass. There is no tenderness. There is no rebound and no guarding.   Musculoskeletal: He exhibits edema (1+ edema). He exhibits no tenderness.   Neurological: He is alert.   Skin: Skin is warm and dry. He is not diaphoretic.   Psychiatric: He has a normal mood and affect. His behavior is normal. Judgment and thought content " normal.     Lab Results   Component Value Date    BNP 1,038 (H) 11/08/2017     11/08/2017    K 3.4 (L) 11/08/2017    MG 1.6 11/08/2017    CL 98 11/08/2017    CO2 28 11/08/2017    BUN 12 11/08/2017    CREATININE 1.1 11/08/2017    GLU 74 11/08/2017    AST 17 11/08/2017    ALT 9 (L) 11/08/2017    ALBUMIN 3.1 (L) 11/08/2017    PROT 7.3 11/08/2017    BILITOT 0.7 11/08/2017    WBC 4.29 11/08/2017    HGB 9.1 (L) 11/08/2017    HCT 28.6 (L) 11/08/2017     11/08/2017    INR 2.2 (H) 11/08/2017     11/08/2017     Assessment:      1. Heart replaced by heart assist device    2. Chronic systolic congestive heart failure    3. Nonischemic cardiomyopathy    4. Essential hypertension    5. Hypokalemia      Plan:   Aldactone 25 mg qd and repeat BMP next week  At this point seems ready to admit to stop  and revise CRRT-D  Wife and patient ready to come in today.  I spoke with Dr. Winchester and he will add him on for this procedure.  Admit to observation today     Patient is now NYHA II    Listed for transplant: No    UNOS Patient Status  Functional Status: 80% - Normal activity with effort: some symptoms of disease  Physical Capacity: No Limitations  Working for Income: No      11/8/2017 1:15 PM addendum to cancel admit today:  Dr Winchester cannot perform procedure this week after all.  Since pt and wife prefer to come in once for both getting off  and performing procedure will set up after Annabella schedules procedure.  Will bringing him in 24 hrs before to stop  and perform RHC off  prior to EP procedure.

## 2017-11-08 NOTE — PROGRESS NOTES
SW followed up with pt and wife today in LVAD clinic. Both alert/oriented x 4 and pleasant.    Pt was able to walk into clinic today without w/c assistance. Pt reports he is really starting to feel better and stronger.    Pt states his appetite is also continuing to grow. Pt and wife very pleased with his progress.    No concerns voiced. Pt coping adequately physically and emotionally.    SW provided support and encouragement. SW continues to follow and remains available.

## 2017-11-09 ENCOUNTER — TELEPHONE (OUTPATIENT)
Dept: TRANSPLANT | Facility: CLINIC | Age: 67
End: 2017-11-09

## 2017-11-09 NOTE — TELEPHONE ENCOUNTER
Spoke with patient regarding scheduling the ep procedure.  I sent a note to arrhythmia clinic and spoke with chad.  They will arrange the procedure date and call the patient/wife

## 2017-11-13 LAB — INR PPP: 2.1

## 2017-11-14 ENCOUNTER — ANTI-COAG VISIT (OUTPATIENT)
Dept: CARDIOLOGY | Facility: CLINIC | Age: 67
End: 2017-11-14

## 2017-11-14 DIAGNOSIS — Z79.01 ANTICOAGULATION MONITORING, INR RANGE 2-3: ICD-10-CM

## 2017-11-14 DIAGNOSIS — Z95.811 LVAD (LEFT VENTRICULAR ASSIST DEVICE) PRESENT: ICD-10-CM

## 2017-11-16 ENCOUNTER — TELEPHONE (OUTPATIENT)
Dept: ELECTROPHYSIOLOGY | Facility: CLINIC | Age: 67
End: 2017-11-16

## 2017-11-16 ENCOUNTER — TELEPHONE (OUTPATIENT)
Dept: TRANSPLANT | Facility: CLINIC | Age: 67
End: 2017-11-16

## 2017-11-16 ENCOUNTER — HOSPITAL ENCOUNTER (EMERGENCY)
Facility: HOSPITAL | Age: 67
Discharge: HOME | End: 2017-11-16
Attending: EMERGENCY MEDICINE
Payer: MEDICARE

## 2017-11-16 VITALS
RESPIRATION RATE: 18 BRPM | SYSTOLIC BLOOD PRESSURE: 85 MMHG | OXYGEN SATURATION: 99 % | DIASTOLIC BLOOD PRESSURE: 71 MMHG | TEMPERATURE: 99 F | HEART RATE: 82 BPM

## 2017-11-16 DIAGNOSIS — S00.81XA FACIAL ABRASION, INITIAL ENCOUNTER: ICD-10-CM
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
## 2017-11-16 DIAGNOSIS — Z79.01 CHRONIC ANTICOAGULATION: ICD-10-CM
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
## 2017-11-16 LAB
ANION GAP SERPL CALC-SCNC: 12 MMOL/L
APTT BLDCRRT: 34 SEC
BASOPHILS # BLD AUTO: 0.06 K/UL
BASOPHILS NFR BLD: 1.3 %
BUN SERPL-MCNC: 11 MG/DL
CALCIUM SERPL-MCNC: 9.6 MG/DL
CHLORIDE SERPL-SCNC: 103 MMOL/L
CO2 SERPL-SCNC: 25 MMOL/L
CREAT SERPL-MCNC: 0.9 MG/DL
DIFFERENTIAL METHOD: ABNORMAL
EOSINOPHIL # BLD AUTO: 0.5 K/UL
EOSINOPHIL NFR BLD: 10.2 %
ERYTHROCYTE [DISTWIDTH] IN BLOOD BY AUTOMATED COUNT: 15.8 %
EST. GFR  (AFRICAN AMERICAN): >60 ML/MIN/1.73 M^2
EST. GFR  (NON AFRICAN AMERICAN): >60 ML/MIN/1.73 M^2
GLUCOSE SERPL-MCNC: 102 MG/DL
HCT VFR BLD AUTO: 30.8 %
HGB BLD-MCNC: 9.9 G/DL
INR PPP: 2
LYMPHOCYTES # BLD AUTO: 1 K/UL
LYMPHOCYTES NFR BLD: 21.6 %
MCH RBC QN AUTO: 25.4 PG
MCHC RBC AUTO-ENTMCNC: 32.1 G/DL
MCV RBC AUTO: 79 FL
MONOCYTES # BLD AUTO: 0.5 K/UL
MONOCYTES NFR BLD: 9.8 %
NEUTROPHILS # BLD AUTO: 2.6 K/UL
NEUTROPHILS NFR BLD: 57.1 %
PLATELET # BLD AUTO: 236 K/UL
PMV BLD AUTO: 10.1 FL
POTASSIUM SERPL-SCNC: 3.8 MMOL/L
PROTHROMBIN TIME: 20.2 SEC
RBC # BLD AUTO: 3.9 M/UL
SODIUM SERPL-SCNC: 140 MMOL/L
WBC # BLD AUTO: 4.59 K/UL

## 2017-11-16 PROCEDURE — 80048 BASIC METABOLIC PNL TOTAL CA: CPT

## 2017-11-16 PROCEDURE — 85025 COMPLETE CBC W/AUTO DIFF WBC: CPT

## 2017-11-16 PROCEDURE — 12011 RPR F/E/E/N/L/M 2.5 CM/<: CPT

## 2017-11-16 PROCEDURE — 85610 PROTHROMBIN TIME: CPT

## 2017-11-16 PROCEDURE — 85730 THROMBOPLASTIN TIME PARTIAL: CPT

## 2017-11-16 PROCEDURE — 99284 EMERGENCY DEPT VISIT MOD MDM: CPT | Mod: 25

## 2017-11-16 NOTE — TELEPHONE ENCOUNTER
Left message on mobile number listed for return call to schedule ICD lead revision. Home phone number had no answer. No voicemail.

## 2017-11-16 NOTE — TELEPHONE ENCOUNTER
"Pt daughter called.  While she was with pt, pt fell and hit his head and "is bleeding above his eye".  Pt daughter reports she has called 911 and ambulance on site.  She has emergency bag and will be heading to [facility].  She verbalizes pt is awake and doing ok .  Instructed her to keep in touch with VAD team.     1600  Called to check on pt.  Pt doing well and d/c home.  Plan is for pt to have ICD revision on Monday and admit after for  wean.  Plan reviewed with wife and she verbalizes understanding and in agreement.   "

## 2017-11-16 NOTE — ED PROVIDER NOTES
SCRIBE #1 NOTE: I, Gagan Lerner, am scribing for, and in the presence of, Deborah Zuniga MD. I have scribed the entire note.      History      Chief Complaint   Patient presents with    Fall     trip and fall, lac above eyebrow uncontrolled bleeding.  Pt on coumadin, and has LVAD       Review of patient's allergies indicates:  No Known Allergies     HPI   HPI    11/16/2017, 10:42 AM   History obtained from the patient and son      History of Present Illness: Suman Hayden is a 67 y.o. male patient who presents to the Emergency Department for fall which onset suddenly PTA. Pt reports he was wrapped up in a blanket when he tripped and fell. Pt reports falling and hitting his head on a wooden floor. Pt has laceration to left eyebrow, reports bleeding controlled. Sxs are constant and moderate in severity. There are no mitigating or exacerbating factors noted.  Pt denies any loss of consciousness,  fever, N/V/D, numbness, CP, back pain, LOC, neck pain, ABD pain, confusion, changes in speech, vision, or hearing, and all other sxs at this time. Pt is on coumadin. No further complaints or concerns at this time.       Arrival mode: AASI    PCP: Joe Ernst MD       Past Medical History:  Past Medical History:   Diagnosis Date    Cardiomyopathy     Hyperlipidemia     Hypertension     Obesity     S/P implantation of automatic cardioverter/defibrillator (AICD)     Ventricular tachycardia        Past Surgical History:  Past Surgical History:   Procedure Laterality Date    CARDIAC CATHETERIZATION      CARDIAC DEFIBRILLATOR PLACEMENT           Family History:  History reviewed. No pertinent family history.    Social History:  Social History     Social History Main Topics    Smoking status: Never Smoker    Smokeless tobacco: Never Used    Alcohol use No    Drug use: Unknown    Sexual activity: unknown       ROS   Review of Systems   Constitutional: Negative for fever.   HENT: Negative for sore  throat.         (+) head trauma   Respiratory: Negative for shortness of breath.    Cardiovascular: Negative for chest pain.   Gastrointestinal: Negative for abdominal pain, diarrhea, nausea and vomiting.   Genitourinary: Negative for dysuria.   Musculoskeletal: Negative for back pain and neck pain.   Skin: Positive for wound (lac to left eyebrow). Negative for rash.   Neurological: Negative for dizziness, syncope, weakness, numbness and headaches.   Hematological: Does not bruise/bleed easily.     Physical Exam      Initial Vitals [11/16/17 1035]   BP Pulse Resp Temp SpO2   98/85 69 16 98.7 °F (37.1 °C) 100 %      MAP       89.33          Physical Exam  Constitutional: Patient is in no distress. Awake and alert. Appropriate for age.   Head: Small less than 0.5 cm superficial lac to left upper eyebrow, no active bleeding, small contusion noted. Mild tenderness over left maxillary ridge. No facial instability or step-offs.   Eyes: PERRL. EOM normal. Conjunctivae normal.   HENT: Moist mucous membranes. No epistaxis. Patent airway.   Neck: No midline bony tenderness, deformities, or step-offs   Cardiovascular: Regular rate and rhythm. Heart sounds are normal. Intact distal pulses   Pulmonary/Chest: No respiratory distress. Breath sounds are normal. No decreased breath sounds. Chest wall is stable.   Abdominal: Soft and non-distended. Non-tender.   Back: No abrasions or ecchymosis. No midline bony tenderness to the T-spine or L-spine. No deformities or step-offs.   Musculoskeletal: Full range of motion in bilateral extremities. No obvious deformities.   Skin: Normal color. No cyanosis. PICC line to RUE. LVAD in place.   Neurological: Awake and alert. Appropriate for age. GCS 15. Normal speech. Motor strength is normal at 5/5 bilaterally. Non-focal neurological examination.    ED Course    Lac Repair  Date/Time: 11/16/2017 12:24 PM  Performed by: MARIA T ANDERSON  Authorized by: MARIA T ANDERSON   Body area:  head/neck  Location details: left eyebrow  Laceration length: 0.5 cm  Foreign bodies: no foreign bodies  Tendon involvement: none  Nerve involvement: none  Vascular damage: no  Patient sedated: no  Preparation: Patient was prepped and draped in the usual sterile fashion.  Skin closure: glue (dermabond)  Patient tolerance: Patient tolerated the procedure well with no immediate complications        ED Vital Signs:  Vitals:    11/16/17 1035 11/16/17 1108   BP: 98/85    Pulse: 69 69   Resp: 16    Temp: 98.7 °F (37.1 °C)    TempSrc: Oral    SpO2: 100%        Abnormal Lab Results:  Labs Reviewed   CBC W/ AUTO DIFFERENTIAL - Abnormal; Notable for the following:        Result Value    RBC 3.90 (*)     Hemoglobin 9.9 (*)     Hematocrit 30.8 (*)     MCV 79 (*)     MCH 25.4 (*)     RDW 15.8 (*)     Eosinophil% 10.2 (*)     All other components within normal limits   APTT - Abnormal; Notable for the following:     aPTT 34.0 (*)     All other components within normal limits   PROTIME-INR - Abnormal; Notable for the following:     Prothrombin Time 20.2 (*)     INR 2.0 (*)     All other components within normal limits   BASIC METABOLIC PANEL        All Lab Results:  Results for orders placed or performed during the hospital encounter of 11/16/17   CBC auto differential   Result Value Ref Range    WBC 4.59 3.90 - 12.70 K/uL    RBC 3.90 (L) 4.60 - 6.20 M/uL    Hemoglobin 9.9 (L) 14.0 - 18.0 g/dL    Hematocrit 30.8 (L) 40.0 - 54.0 %    MCV 79 (L) 82 - 98 fL    MCH 25.4 (L) 27.0 - 31.0 pg    MCHC 32.1 32.0 - 36.0 g/dL    RDW 15.8 (H) 11.5 - 14.5 %    Platelets 236 150 - 350 K/uL    MPV 10.1 9.2 - 12.9 fL    Gran # 2.6 1.8 - 7.7 K/uL    Lymph # 1.0 1.0 - 4.8 K/uL    Mono # 0.5 0.3 - 1.0 K/uL    Eos # 0.5 0.0 - 0.5 K/uL    Baso # 0.06 0.00 - 0.20 K/uL    Gran% 57.1 38.0 - 73.0 %    Lymph% 21.6 18.0 - 48.0 %    Mono% 9.8 4.0 - 15.0 %    Eosinophil% 10.2 (H) 0.0 - 8.0 %    Basophil% 1.3 0.0 - 1.9 %    Differential Method Automated     Basic metabolic panel   Result Value Ref Range    Sodium 140 136 - 145 mmol/L    Potassium 3.8 3.5 - 5.1 mmol/L    Chloride 103 95 - 110 mmol/L    CO2 25 23 - 29 mmol/L    Glucose 102 70 - 110 mg/dL    BUN, Bld 11 8 - 23 mg/dL    Creatinine 0.9 0.5 - 1.4 mg/dL    Calcium 9.6 8.7 - 10.5 mg/dL    Anion Gap 12 8 - 16 mmol/L    eGFR if African American >60 >60 mL/min/1.73 m^2    eGFR if non African American >60 >60 mL/min/1.73 m^2   APTT   Result Value Ref Range    aPTT 34.0 (H) 21.0 - 32.0 sec   Protime-INR   Result Value Ref Range    Prothrombin Time 20.2 (H) 9.0 - 12.5 sec    INR 2.0 (H) 0.8 - 1.2         Imaging Results:  Imaging Results          CT Maxillofacial Without Contrast (Final result)  Result time 11/16/17 11:22:55    Final result by Baljinder Barros MD (11/16/17 11:22:55)                 Impression:        Negative exam.     All CT scans at this facility use dose modulation, iterative reconstruction, and/or weight base dosing when appropriate to reduce radiation dose to as low as reasonably achievable.      Electronically signed by: BALJINDER BARROS MD  Date:     11/16/17  Time:    11:22              Narrative:    MAXILLOFACIAL CT WITHOUT CONTRAST.      Technique: 2.5 mm axial images were obtained through the maxillofacial region from the level of the frontal sinuses through the mandible without contrast. Coronal reconstructions were performed on the scanner.     Comparison: None.     History:  Trauma     Findings:    No fracture of the maxillofacial region.  Specifically, the orbital walls, paranasal sinus walls, nasal bones, zygomatic arches, ptyergoid plates, maxilla, and mandible are intact. The mandibular condyles are normal in location.        The paranasal sinuses are clear. The globes are intact, and there is no retrobulbar hematoma or abnormality of the optic nerves or the extraocular muscles.  Visualized intracranial contents are unremarkable.                             CT Head Without Contrast  (Final result)  Result time 11/16/17 11:21:37    Final result by Baljinder Barros MD (11/16/17 11:21:37)                 Impression:         Chronic microvascular ischemic changes.     All CT scans at this facility use dose modulation, iterative reconstruction and/or weight based dosing when appropriate to reduce radiation dose to as low as reasonably achievable.        Electronically signed by: BALJINDER BARROS MD  Date:     11/16/17  Time:    11:21              Narrative:    CT OF THE HEAD WITHOUT CONTRAST:     Technique: 5 mm axial images were obtained from the skullbase to the vertex, without administration of contrast.    Comparison: None.    History:  Trauma    Findings:    No intracranial hemorrhage, extra-axial fluid collection, midline shift, or mass effect.  No evidence of an acute cortical infarct or of an infarct in a major vascular territory.    There is mild prominence of the ventricles and sulci compatible with diffuse cerebral volume loss.  Patchy hypoattenuation in the periventricular white matter regions is nonspecific, but most commonly seen with chronic microvascular ischemic changes. Vascular calcifications noted.    The calvarium is unremarkable. Visualized portions of the paranasal sinuses are clear. Visualized mastoid air cells are clear. Visualized orbits are unremarkable.                                      The Emergency Provider reviewed the vital signs and test results, which are outlined above.    ED Discussion     12:20 PM: Reassessed pt, family at bedside, head injury precautions discussed at length. Pt states their condition has improved at this time.  Discussed with pt all pertinent ED information and results. Discussed plan of treatment with pt. Gave pt all f/u and return to the ED instructions. All questions and concerns were addressed at this time. Pt understands and agrees to plan as discussed. Pt is stable for discharge.     Trauma precautions were discussed with patient and/or  family/caretaker; I do not specifically detect any abdominal, thoracic, CNS, orthopedic, or other emergent or life threatening condition and that patient is safe to be discharged.  It was also discussed that despite an unrevealing examination and negative radiographic examination for serious or life threatening injury, these conditions may still exist.  As such, patient should return to ED immediately should they experience, severe or worsening pain, shortness of breath, abdominal pain, headache, vomiting, or any other concern.  It was also discussed that not infrequently, injuries may not be diagnosed during the initial ED visit (such as fractures) and that if the patient discovers a new area of concern, a new area of injury that was not evaluated in the ED, they should return for evaluation as they may have an injury that requires treatment.    I discussed wound care precautions with patient and/or family/caretaker; specifically that all wounds have risk of infection despite efforts to cleanse and debride the wound; and there is a risk of an occult foreign body (and thus increased risk of infection) despite a negative examination.  I discussed with patient need to return for any signs of infection, specifically redness, increased pain, fever, drainage of pus, or any concern, immediately.    ED Medication(s):  Medications - No data to display    New Prescriptions    No medications on file       Follow-up Information     Joe Ernst MD. Schedule an appointment as soon as possible for a visit in 1 day.    Specialty:  Cardiology  Why:  Return to the Emergency Room, If symptoms worsen  Contact information:  [address] [facility] [id number] [phone]                     Medical Decision Making    Medical Decision Making:   Clinical Tests:   Lab Tests: Reviewed and Ordered  Radiological Study: Reviewed and Ordered           Scribe Attestation:   Scribe #1: I performed the above scribed service and the  documentation accurately describes the services I performed. I attest to the accuracy of the note.    Attending:   Physician Attestation Statement for Scribe #1: I, Deborah Zuniga MD, personally performed the services described in this documentation, as scribed by Gagan Lerner, in my presence, and it is both accurate and complete.          Clinical Impression       ICD-10-CM ICD-9-CM   1. Fall at home, initial encounter W19.XXXA E888.9    Y92.099 E849.0   2. Chronic anticoagulation Z79.01 V58.61   3. Facial abrasion, initial encounter S00.81XA 910.0       Disposition:   Disposition: Discharged  Condition: Stable         Deborah Zuniga MD  11/16/17 3905

## 2017-11-17 ENCOUNTER — ANTI-COAG VISIT (OUTPATIENT)
Dept: CARDIOLOGY | Facility: CLINIC | Age: 67
End: 2017-11-17

## 2017-11-17 ENCOUNTER — TELEPHONE (OUTPATIENT)
Dept: ELECTROPHYSIOLOGY | Facility: CLINIC | Age: 67
End: 2017-11-17

## 2017-11-17 ENCOUNTER — ANESTHESIA EVENT (OUTPATIENT)
Dept: MEDSURG UNIT | Facility: HOSPITAL | Age: 67
DRG: 245 | End: 2017-11-17
Payer: MEDICARE

## 2017-11-17 ENCOUNTER — TELEPHONE (OUTPATIENT)
Dept: TRANSPLANT | Facility: CLINIC | Age: 67
End: 2017-11-17

## 2017-11-17 DIAGNOSIS — Z79.01 ANTICOAGULATION MONITORING, INR RANGE 2-3: ICD-10-CM

## 2017-11-17 DIAGNOSIS — Z95.811 LVAD (LEFT VENTRICULAR ASSIST DEVICE) PRESENT: ICD-10-CM

## 2017-11-17 NOTE — TELEPHONE ENCOUNTER
Post-Procedure Patient Discharge Instructions  Pacemaker/Defibrillator  Wound Care   If you are discharged with a standard dressing over the incision, you may remove the dressing after 24 hours.    If you are discharged with an AQUACEL dressing, you should keep it on until your follow-up appointment in 1-2 weeks.   If there are Steri-strips (strips of tape) over your incision, leave them on until your follow-up appointment. They may begin to fall off on their own, which is normal. If there is Dermabond (clear glue) over your incision, do not scrub it off. It acts as a barrier and will eventually disappear.   You will be discharged with 5 days of oral antibiotics. Please take the full prescription until it is gone.   Do not get the incision wet for 48 hours following the procedure. You may sponge bath during this period, working around the incision. After 48 hours, you may shower, but you should still try to keep this area as dry as possible, and avoid direct water contact to the incision (allow the water to hit back of your shoulder rather than directly on the incision). Gently pat the incision dry if it does get wet.   You may take regular showers after 2 weeks, unless otherwise indicated at your follow-up visit.   Do not submerge the incision in a tub, pool, or body of water for 6 weeks.   Avoid using deodorants, powders, creams, lotions, etc. on your incision for 6 weeks.   If you notice unusual swelling, redness, drainage, have more device site pain, chest pain, shortness of breath, or have a fever greater than 100 degrees, call our device clinic immediately: [phone] or [phone] during normal office hours. You may call [phone] after-hours or on weekends and ask for the electrophysiologist on call.  Activity    If you only had a battery/generator change performed, there are no postoperative activity restrictions.    If this is your first device or if you had new wires added to your  existing device, then you will be discharged in a sling which you should wear continuously for 48 hours. After that, the sling should remain off during the day but should be worn at night for another 2-4 weeks (depending on how active a sleeper you are).   Do not raise your device-side arm above your shoulder for 6 weeks. Do not lift more than 5-10 lbs with your device-side arm for 6 weeks.    If you were driving prior to the procedure, you may resume driving after your first follow-up appointment (1-2 weeks). If you have a history of passing out or a history of certain arrhythmias, there may be driving restrictions unrelated to the procedure. Please clarify with your physician if this is the case.   No heavy activity with the affected arm for 6 weeks (eg. tennis, golf, bowling, aerobics, mowing the lawn, etc.).   Avoid rough contact at the device site for 6 weeks.   You may participate in sexual activity unless otherwise instructed.   You may return to work within 3-5 days unless told otherwise, provided you adhere to the above activity restrictions.  Long-Term Instructions   Keep your pacemaker or defibrillator identification card with you at all times.   If you have a defibrillator and you get shocked by the device: If you receive one shock and you feel ok, you may call [phone] or [phone] during normal office hours. You may call [phone] after-hours. If you receive one shock and you do not feel well, call Emergency Medical Services. They will take you to the nearest emergency room.   If you have a defibrillator and you get more than one shock from the device or multiple shocks in a short period of time: Call Emergency Medical Services. They will take you to the nearest emergency room.   Appliances: You may operate any electrical device in your home, including microwaves.   Security Systems: Electromagnetic security systems can be located in the workplace, courthouses, or other  high-security areas. Exposure to this type of security system has been shown to cause interference in some cases. Interference may be related to the duration of exposure and/or the distance between the device and the security device. You should be aware of the location of security systems, move through them at a normal pace, and avoid leaning or standing too close.   Metal Detectors at Airports: Metal detectors at airports can potentially interfere with pacemakers or defibrillators, although this is unlikely. Metal detectors will likely be triggered by your device and therefore at places such as airports  it will be important for you to carry your identification card for the pacemaker/defibrillator. Airport personnel will likely prefer to do a manual search.   Cellular Phones: It is unlikely that using a cellular phone will interfere with your device. It should be used with the hand opposite to the side where your device was implanted. The phone should not be carried in the shirt pocket on the same side as the device.   Specific Work Conditions: Patients who work near high-voltage lines, transmitting towers, large motors, welding equipment, or powerful magnets should discuss their specific situation with their physician. In general, remain at least two feet from external electrical equipment, verify that the equipment is properly grounded, and wear insulated gloves when using electrical devices. Leave the immediate location if lightheadedness or other symptoms develop.   MRI: Some pacemakers and defibrillators are safe in MRI scanners, while others are not. Please consult with your physician to see if you have an MRI-compatible device.   Surgery: Should you require surgery in the future, some electrosurgical devices can interfere with your device function. You should discuss this with your surgeon before any operation.   Radiation Therapy: If you ever require radiation therapy in the future, care must be taken  to avoid irradiating the device.  Long-Term Follow-Up   Your device has the ability to transmit device information from home to the doctors office using a home monitoring system.   This remote system takes the place of a doctors visit. Your device will be checked from home every 3-6 months. Every 6-12 months, you will be asked to come into the office for an in-office check.   Your device should last in the range of 6-12 years. This depends on many factors including how often it paces the heart.   When the battery is low, a generator change will be performed. This is a same-day procedure with no post-op activity restrictions, unless one of the pacemaker or defibrillator leads needs to be replaced at that time, or a new lead is added to your existing system.

## 2017-11-17 NOTE — TELEPHONE ENCOUNTER
At last clinic visit following plan made:  11/8/2017 1:15 PM addendum to cancel admit today:  Dr Winchester cannot perform procedure this week after all.  Since pt and wife prefer to come in once for both getting off  and performing procedure will set up after Annabella schedules procedure.  Will bringing him in 24 hrs before to stop  and perform RHC off  prior to EP procedure.     I spoke with Dr. Torres who is in the lab next week as timing of procedure such that weaning  and RHC prior to ICD revision will not be possible.  She is OK performing RHC under fluoroscopy with fresh lead.  Also left message for Dr. Winchester and will forward this for his perusal.

## 2017-11-17 NOTE — TELEPHONE ENCOUNTER
----- Message from Rukhsana Cook RN sent at 11/16/2017  1:38 PM CST -----  Pt will have ICD lead revision on Monday 11/20/17 with Dr. Winchester.

## 2017-11-17 NOTE — PROGRESS NOTES
Patient was called and spoke with Ms Hayden, she was given lab result, verified correct dose of coumadin, reports that yesterday 11/16 the patient had a fall, went to ER, has small laceration on his head which bled a bit, had CT scan done--states was told everything is ok, had liquid band aid applied, did not have to hold coumadin

## 2017-11-17 NOTE — PROGRESS NOTES
Wife called and she was given lab result, reports that the Patient will be admitted Monday to have his Difibrillator changed, also reports that he will be stopping the Dabutamine and life support Gracie irwin-WilliamD was notified, Wife was advised of coumadin instructions and redraw date

## 2017-11-17 NOTE — TELEPHONE ENCOUNTER
IMPLANTABLE DEVICE EDUCATION CHECKLIST      11/20/17 @ 9 AM  REPORT TO CARDIOLOGY WAITING ROOM ON 3RD FLOOR OF HOSPITAL (DO NOT REPORT TO CLINIC)  Directions for Reporting to Cardiology Waiting Area in the Hospital  If you park in the Parking Garage:  Take elevators to the 2nd floor  Walk up ramp and turn right by Gold Elevators  Take elevator to the 3rd floor  Upon exiting the elevator, turn away from the clinic areas  Walk long mckeon around to front of hospital to area with windows overlooking [facility]  Check in at Reception Desk  OR  If family is dropping you off:  Have them drop you off at the front of the Hospital  (Near the ER, where all the flags are hung).  Take the E elevators to the 3rd floor.  Check in at the Reception Desk in the waiting room.    WASH YOUR CHEST WITH HIBICLENS OR AN ANTIBACTERIAL SOAP (SUCH AS DIAL) ON THE NIGHT BEFORE AND THE MORNING OF YOUR PROCEDURE.    DO NOT EAT OR DRINK ANYTHING AFTER: 12 MN ON THE NIGHT BEFORE YOUR PROCEDURE    MEDICATIONS  HOLD YOUR FLUID PILL: LASIX (FUORSEMIDE) ON THE MORNING OF YOUR PROCEDURE  YOU MAY TAKE YOUR OTHER USUAL MORNING MEDICATIONS WITH A SIP OF WATER    YOU WILL BE SPENDING THE NIGHT AFTER YOUR PROCEDURE  YOU WILL NEED SOMEONE TO DRIVE YOU HOME THE DAY AFTER YOUR PROCEDURE    YOUR PAIN DURING YOUR PROCEDURE WILL BE MANAGED BY THE ANESTHESIA TEAM    THE ABOVE INSTRUCTIONS WERE GIVEN TO THE PATIENT VERBALLY AND THEY VERBALIZED UNDERSTANDING. THEY DO NOT REQUIRE ANY SPECIAL NEEDS AND DO NOT HAVE ANY LEARNING BARRIERS.     Any need to reschedule or cancel procedures, or any questions regarding your procedures should be addressed directly with the Arrhythmia Department Nurses at the following phone number: [phone]

## 2017-11-20 ENCOUNTER — ANESTHESIA (OUTPATIENT)
Dept: MEDSURG UNIT | Facility: HOSPITAL | Age: 67
DRG: 245 | End: 2017-11-20
Payer: MEDICARE

## 2017-11-20 ENCOUNTER — HOSPITAL ENCOUNTER (INPATIENT)
Facility: HOSPITAL | Age: 67
LOS: 2 days | Discharge: HOME HEALTH | DRG: 245 | End: 2017-11-22
Attending: INTERNAL MEDICINE | Admitting: INTERNAL MEDICINE
Payer: MEDICARE

## 2017-11-20 DIAGNOSIS — T82.110A: ICD-10-CM

## 2017-11-20 DIAGNOSIS — I49.9 ARRHYTHMIA: ICD-10-CM

## 2017-11-20 DIAGNOSIS — I50.9 HEART FAILURE: ICD-10-CM

## 2017-11-20 DIAGNOSIS — I50.22 CHRONIC SYSTOLIC CONGESTIVE HEART FAILURE: ICD-10-CM

## 2017-11-20 DIAGNOSIS — I42.8 OTHER CARDIOMYOPATHIES (CODE): ICD-10-CM

## 2017-11-20 DIAGNOSIS — I50.9 CHF (CONGESTIVE HEART FAILURE): Primary | ICD-10-CM

## 2017-11-20 DIAGNOSIS — Z95.0 PACEMAKER: ICD-10-CM

## 2017-11-20 PROCEDURE — 0JH609Z INSERTION OF CARDIAC RESYNCHRONIZATION DEFIBRILLATOR PULSE GENERATOR INTO CHEST SUBCUTANEOUS TISSUE AND FASCIA, OPEN APPROACH: ICD-10-PCS | Performed by: INTERNAL MEDICINE

## 2017-11-20 PROCEDURE — 63600175 PHARM REV CODE 636 W HCPCS: Performed by: NURSE PRACTITIONER

## 2017-11-20 PROCEDURE — 93005 ELECTROCARDIOGRAM TRACING: CPT

## 2017-11-20 PROCEDURE — 25000003 PHARM REV CODE 250: Performed by: NURSE PRACTITIONER

## 2017-11-20 PROCEDURE — 02WA3MZ REVISION OF CARDIAC LEAD IN HEART, PERCUTANEOUS APPROACH: ICD-10-PCS | Performed by: INTERNAL MEDICINE

## 2017-11-20 PROCEDURE — 99223 1ST HOSP IP/OBS HIGH 75: CPT | Mod: ,,, | Performed by: INTERNAL MEDICINE

## 2017-11-20 PROCEDURE — 37000009 HC ANESTHESIA EA ADD 15 MINS: Performed by: INTERNAL MEDICINE

## 2017-11-20 PROCEDURE — 25000003 PHARM REV CODE 250: Performed by: INTERNAL MEDICINE

## 2017-11-20 PROCEDURE — 25000003 PHARM REV CODE 250: Performed by: NURSE ANESTHETIST, CERTIFIED REGISTERED

## 2017-11-20 PROCEDURE — 63600175 PHARM REV CODE 636 W HCPCS

## 2017-11-20 PROCEDURE — 25000003 PHARM REV CODE 250

## 2017-11-20 PROCEDURE — 27201642 EP LAB PROCEDURE

## 2017-11-20 PROCEDURE — 33263 RMVL & RPLCMT DFB GEN 2 LEAD: CPT | Mod: ,,, | Performed by: INTERNAL MEDICINE

## 2017-11-20 PROCEDURE — 63600175 PHARM REV CODE 636 W HCPCS: Performed by: NURSE ANESTHETIST, CERTIFIED REGISTERED

## 2017-11-20 PROCEDURE — 93010 ELECTROCARDIOGRAM REPORT: CPT | Mod: ,,, | Performed by: INTERNAL MEDICINE

## 2017-11-20 PROCEDURE — D9220A PRA ANESTHESIA: Mod: CRNA,,, | Performed by: NURSE ANESTHETIST, CERTIFIED REGISTERED

## 2017-11-20 PROCEDURE — A4216 STERILE WATER/SALINE, 10 ML: HCPCS | Performed by: NURSE ANESTHETIST, CERTIFIED REGISTERED

## 2017-11-20 PROCEDURE — 27201037 HC PRESSURE MONITORING SET UP

## 2017-11-20 PROCEDURE — 11000001 HC ACUTE MED/SURG PRIVATE ROOM

## 2017-11-20 PROCEDURE — 0JPT0PZ REMOVAL OF CARDIAC RHYTHM RELATED DEVICE FROM TRUNK SUBCUTANEOUS TISSUE AND FASCIA, OPEN APPROACH: ICD-10-PCS | Performed by: INTERNAL MEDICINE

## 2017-11-20 PROCEDURE — 27000248 HC VAD-ADDITIONAL DAY

## 2017-11-20 PROCEDURE — D9220A PRA ANESTHESIA: Mod: ANES,,, | Performed by: ANESTHESIOLOGY

## 2017-11-20 PROCEDURE — 37000008 HC ANESTHESIA 1ST 15 MINUTES: Performed by: INTERNAL MEDICINE

## 2017-11-20 RX ORDER — ONDANSETRON 4 MG/1
4 TABLET, ORALLY DISINTEGRATING ORAL EVERY 8 HOURS PRN
Status: DISCONTINUED | OUTPATIENT
Start: 2017-11-21 | End: 2017-11-22 | Stop reason: HOSPADM

## 2017-11-20 RX ORDER — HYDROCODONE BITARTRATE AND ACETAMINOPHEN 5; 325 MG/1; MG/1
1 TABLET ORAL EVERY 4 HOURS PRN
Status: DISCONTINUED | OUTPATIENT
Start: 2017-11-20 | End: 2017-11-22 | Stop reason: HOSPADM

## 2017-11-20 RX ORDER — MUPIROCIN 20 MG/G
1 OINTMENT TOPICAL 2 TIMES DAILY
Status: DISCONTINUED | OUTPATIENT
Start: 2017-11-20 | End: 2017-11-22 | Stop reason: HOSPADM

## 2017-11-20 RX ORDER — ONDANSETRON 2 MG/ML
INJECTION INTRAMUSCULAR; INTRAVENOUS
Status: DISCONTINUED | OUTPATIENT
Start: 2017-11-20 | End: 2017-11-20

## 2017-11-20 RX ORDER — HYDRALAZINE HYDROCHLORIDE 50 MG/1
50 TABLET, FILM COATED ORAL EVERY 8 HOURS
Status: DISCONTINUED | OUTPATIENT
Start: 2017-11-21 | End: 2017-11-22 | Stop reason: HOSPADM

## 2017-11-20 RX ORDER — WARFARIN 2 MG/1
2 TABLET ORAL DAILY
Status: DISCONTINUED | OUTPATIENT
Start: 2017-11-20 | End: 2017-11-21

## 2017-11-20 RX ORDER — POTASSIUM CHLORIDE 20 MEQ/1
40 TABLET, EXTENDED RELEASE ORAL 2 TIMES DAILY
Status: DISCONTINUED | OUTPATIENT
Start: 2017-11-21 | End: 2017-11-22 | Stop reason: HOSPADM

## 2017-11-20 RX ORDER — NAPROXEN SODIUM 220 MG/1
81 TABLET, FILM COATED ORAL DAILY
Status: DISCONTINUED | OUTPATIENT
Start: 2017-11-21 | End: 2017-11-22 | Stop reason: HOSPADM

## 2017-11-20 RX ORDER — FUROSEMIDE 80 MG/1
80 TABLET ORAL 2 TIMES DAILY
Status: DISCONTINUED | OUTPATIENT
Start: 2017-11-21 | End: 2017-11-22 | Stop reason: HOSPADM

## 2017-11-20 RX ORDER — HYDROCODONE BITARTRATE AND ACETAMINOPHEN 5; 325 MG/1; MG/1
1 TABLET ORAL EVERY 6 HOURS PRN
Status: DISCONTINUED | OUTPATIENT
Start: 2017-11-21 | End: 2017-11-20

## 2017-11-20 RX ORDER — HYDROMORPHONE HYDROCHLORIDE 1 MG/ML
0.2 INJECTION, SOLUTION INTRAMUSCULAR; INTRAVENOUS; SUBCUTANEOUS EVERY 5 MIN PRN
Status: DISCONTINUED | OUTPATIENT
Start: 2017-11-20 | End: 2017-11-20

## 2017-11-20 RX ORDER — AMLODIPINE BESYLATE 10 MG/1
10 TABLET ORAL DAILY
Status: DISCONTINUED | OUTPATIENT
Start: 2017-11-21 | End: 2017-11-22 | Stop reason: HOSPADM

## 2017-11-20 RX ORDER — MIDAZOLAM HYDROCHLORIDE 1 MG/ML
INJECTION, SOLUTION INTRAMUSCULAR; INTRAVENOUS
Status: DISCONTINUED | OUTPATIENT
Start: 2017-11-20 | End: 2017-11-20

## 2017-11-20 RX ORDER — FENTANYL CITRATE 50 UG/ML
25 INJECTION, SOLUTION INTRAMUSCULAR; INTRAVENOUS EVERY 5 MIN PRN
Status: DISCONTINUED | OUTPATIENT
Start: 2017-11-20 | End: 2017-11-20

## 2017-11-20 RX ORDER — FENTANYL CITRATE 50 UG/ML
INJECTION, SOLUTION INTRAMUSCULAR; INTRAVENOUS
Status: DISCONTINUED | OUTPATIENT
Start: 2017-11-20 | End: 2017-11-20

## 2017-11-20 RX ORDER — DIPHENHYDRAMINE HYDROCHLORIDE 50 MG/ML
25 INJECTION INTRAMUSCULAR; INTRAVENOUS EVERY 6 HOURS PRN
Status: DISCONTINUED | OUTPATIENT
Start: 2017-11-20 | End: 2017-11-20

## 2017-11-20 RX ORDER — SODIUM CHLORIDE 9 MG/ML
INJECTION, SOLUTION INTRAVENOUS CONTINUOUS
Status: DISCONTINUED | OUTPATIENT
Start: 2017-11-20 | End: 2017-11-22 | Stop reason: HOSPADM

## 2017-11-20 RX ORDER — WARFARIN 3 MG/1
3 TABLET ORAL
Status: DISCONTINUED | OUTPATIENT
Start: 2017-11-21 | End: 2017-11-21

## 2017-11-20 RX ORDER — PRAVASTATIN SODIUM 20 MG/1
20 TABLET ORAL NIGHTLY
Status: DISCONTINUED | OUTPATIENT
Start: 2017-11-21 | End: 2017-11-22 | Stop reason: HOSPADM

## 2017-11-20 RX ORDER — POTASSIUM CHLORIDE 20 MEQ/1
40 TABLET, EXTENDED RELEASE ORAL ONCE
Status: COMPLETED | OUTPATIENT
Start: 2017-11-20 | End: 2017-11-21

## 2017-11-20 RX ORDER — DIPHENHYDRAMINE HYDROCHLORIDE 50 MG/ML
25 INJECTION INTRAMUSCULAR; INTRAVENOUS EVERY 6 HOURS PRN
Status: DISCONTINUED | OUTPATIENT
Start: 2017-11-20 | End: 2017-11-20 | Stop reason: HOSPADM

## 2017-11-20 RX ORDER — SODIUM CHLORIDE 9 MG/ML
INJECTION, SOLUTION INTRAVENOUS ONCE
Status: CANCELLED | OUTPATIENT
Start: 2017-11-21 | End: 2017-11-21

## 2017-11-20 RX ORDER — CEFAZOLIN SODIUM 1 G/50ML
1 SOLUTION INTRAVENOUS
Status: DISCONTINUED | OUTPATIENT
Start: 2017-11-20 | End: 2017-11-21 | Stop reason: SDUPTHER

## 2017-11-20 RX ORDER — HYDROMORPHONE HYDROCHLORIDE 1 MG/ML
0.2 INJECTION, SOLUTION INTRAMUSCULAR; INTRAVENOUS; SUBCUTANEOUS EVERY 5 MIN PRN
Status: DISCONTINUED | OUTPATIENT
Start: 2017-11-20 | End: 2017-11-20 | Stop reason: HOSPADM

## 2017-11-20 RX ORDER — DRONABINOL 2.5 MG/1
5 CAPSULE ORAL
Status: DISCONTINUED | OUTPATIENT
Start: 2017-11-21 | End: 2017-11-22 | Stop reason: HOSPADM

## 2017-11-20 RX ORDER — DEXMEDETOMIDINE HYDROCHLORIDE 100 UG/ML
INJECTION, SOLUTION INTRAVENOUS
Status: DISCONTINUED | OUTPATIENT
Start: 2017-11-20 | End: 2017-11-20

## 2017-11-20 RX ORDER — PANTOPRAZOLE SODIUM 40 MG/1
40 TABLET, DELAYED RELEASE ORAL DAILY
Status: DISCONTINUED | OUTPATIENT
Start: 2017-11-21 | End: 2017-11-22 | Stop reason: HOSPADM

## 2017-11-20 RX ORDER — FENTANYL CITRATE 50 UG/ML
25 INJECTION, SOLUTION INTRAMUSCULAR; INTRAVENOUS EVERY 5 MIN PRN
Status: DISCONTINUED | OUTPATIENT
Start: 2017-11-20 | End: 2017-11-20 | Stop reason: HOSPADM

## 2017-11-20 RX ORDER — ACETAMINOPHEN 325 MG/1
325 TABLET ORAL EVERY 4 HOURS PRN
Status: DISCONTINUED | OUTPATIENT
Start: 2017-11-20 | End: 2017-11-22 | Stop reason: HOSPADM

## 2017-11-20 RX ORDER — DOBUTAMINE HYDROCHLORIDE 400 MG/100ML
2.5 INJECTION INTRAVENOUS CONTINUOUS
Status: DISCONTINUED | OUTPATIENT
Start: 2017-11-20 | End: 2017-11-20

## 2017-11-20 RX ORDER — POLYETHYLENE GLYCOL 3350 17 G/17G
17 POWDER, FOR SOLUTION ORAL DAILY
Status: DISCONTINUED | OUTPATIENT
Start: 2017-11-21 | End: 2017-11-22 | Stop reason: HOSPADM

## 2017-11-20 RX ADMIN — Medication 2 G: at 04:11

## 2017-11-20 RX ADMIN — DEXMEDETOMIDINE HYDROCHLORIDE 8 MCG: 100 INJECTION, SOLUTION, CONCENTRATE INTRAVENOUS at 05:11

## 2017-11-20 RX ADMIN — FENTANYL CITRATE 50 MCG: 50 INJECTION, SOLUTION INTRAMUSCULAR; INTRAVENOUS at 05:11

## 2017-11-20 RX ADMIN — FENTANYL CITRATE 25 MCG: 50 INJECTION, SOLUTION INTRAMUSCULAR; INTRAVENOUS at 04:11

## 2017-11-20 RX ADMIN — MIDAZOLAM 1 MG: 1 INJECTION INTRAMUSCULAR; INTRAVENOUS at 03:11

## 2017-11-20 RX ADMIN — MUPIROCIN 1 G: 20 OINTMENT TOPICAL at 09:11

## 2017-11-20 RX ADMIN — DEXMEDETOMIDINE HYDROCHLORIDE 0.4 MCG/KG/HR: 100 INJECTION, SOLUTION, CONCENTRATE INTRAVENOUS at 03:11

## 2017-11-20 RX ADMIN — ACETAMINOPHEN 325 MG: 325 TABLET ORAL at 09:11

## 2017-11-20 RX ADMIN — ONDANSETRON 4 MG: 2 INJECTION INTRAMUSCULAR; INTRAVENOUS at 04:11

## 2017-11-20 RX ADMIN — SODIUM CHLORIDE 1000 ML: 0.9 INJECTION, SOLUTION INTRAVENOUS at 10:11

## 2017-11-20 RX ADMIN — DEXMEDETOMIDINE HYDROCHLORIDE 16 MCG: 100 INJECTION, SOLUTION, CONCENTRATE INTRAVENOUS at 03:11

## 2017-11-20 NOTE — PROGRESS NOTES
EP lab called for update.  Pt is next, but Dr Jacque Rodriguez is still in another case.  Pt notified.

## 2017-11-20 NOTE — ASSESSMENT & PLAN NOTE
ICD lead Revision   Anesthesia for sedation     Prior to procedure, extensive discussion with patient regarding risks and benefits of  Lead revision  and patient  would like to proceed.  The patient and spouse Mrs Hayden  voices understanding and all questions have been answered. No further questions/concerns voiced at this time.

## 2017-11-20 NOTE — SUBJECTIVE & OBJECTIVE
Past Medical History:   Diagnosis Date    Cardiomyopathy     Hyperlipidemia     Hypertension     Obesity     S/P implantation of automatic cardioverter/defibrillator (AICD)     Ventricular tachycardia        Past Surgical History:   Procedure Laterality Date    CARDIAC CATHETERIZATION      CARDIAC DEFIBRILLATOR PLACEMENT      LEFT VENTRICULAR ASSIST DEVICE  07/27/2017       Review of patient's allergies indicates:  No Known Allergies    No current facility-administered medications on file prior to encounter.      Current Outpatient Prescriptions on File Prior to Encounter   Medication Sig    amLODIPine (NORVASC) 10 MG tablet Take 1 tablet (10 mg total) by mouth once daily.    aspirin 81 MG Chew Take 81 mg by mouth once daily.    DOBUTamine (DOBUTREX) 500 mg/250 mL (2,000 mcg/mL) Inject 199.75 mcg/min into the vein continuous.    dronabinol (MARINOL) 5 MG capsule Take 1 capsule (5 mg total) by mouth 2 (two) times daily before meals.    furosemide (LASIX) 80 MG tablet Take 1 tablet (80 mg total) by mouth 2 (two) times daily.    hydrALAZINE (APRESOLINE) 50 MG tablet Take 1 tablet (50 mg total) by mouth every 8 (eight) hours.    ondansetron (ZOFRAN-ODT) 4 MG TbDL Take 1 tablet (4 mg total) by mouth every 8 (eight) hours as needed.    pantoprazole (PROTONIX) 40 MG tablet Take 1 tablet (40 mg total) by mouth once daily.    polyethylene glycol (GLYCOLAX) 17 gram/dose powder As needed    potassium chloride SA (K-DUR,KLOR-CON) 20 MEQ tablet Take 2 tablets (40 mEq total) by mouth 2 (two) times daily.    pravastatin (PRAVACHOL) 20 MG tablet Take 1 tablet (20 mg total) by mouth every evening.    warfarin (COUMADIN) 2 MG tablet Take 1 tablet (2 mg total) by mouth Daily. (Patient taking differently: Take 2 mg by mouth Daily. And ad directed)    ferrous sulfate 324 mg (65 mg iron) TbEC Take 1 tablet (324 mg total) by mouth once daily.    hydrocodone-acetaminophen 5-325mg (NORCO) 5-325 mg per tablet Take 1  tablet by mouth every 6 (six) hours as needed (Acute pain, Z95.811 left ventricular assist device placement).    spironolactone (ALDACTONE) 25 MG tablet Take 1 tablet (25 mg total) by mouth once daily.     Family History     Problem Relation (Age of Onset)    Heart disease Mother, Father        Social History Main Topics    Smoking status: Never Smoker    Smokeless tobacco: Never Used    Alcohol use No    Drug use: Unknown    Sexual activity: Not on file     ROS     Constitution: Positive for malaise/fatigue. Negative for chills, decreased appetite, diaphoresis, fever, weakness, night sweats, weight gain and weight loss.   Eyes: Negative.  Negative for blurred vision and visual disturbance.   Cardiovascular: . Negative for chest pain, claudication, cyanosis, irregular heartbeat, leg swelling, near-syncope, orthopnea, palpitations, paroxysmal nocturnal dyspnea and syncope.   Respiratory: Negative for cough, hemoptysis, shortness of breath, sleep disturbances due to breathing, snoring and wheezing.    Endocrine: Negative.    Hematologic/Lymphatic: Negative.  Negative for bleeding problem. Does not bruise/bleed easily.   Skin: Negative for color change, dry skin, nail changes, poor wound healing and rash.   Musculoskeletal: Negative.  Negative for arthritis, joint pain and muscle weakness.   Gastrointestinal:  Negative for bloating, abdominal pain, constipation, diarrhea, hematemesis, hematochezia, melena and vomiting.   Genitourinary:  Negative for frequency and urgency.   Neurological: Negative for difficulty with concentration,  sleepiness, dizziness, headaches and light-headedness.   Psychiatric/Behavioral: Negative for depression. The patient does not have insomnia and is not nervous/anxious.       Objective:     Vital Signs (Most Recent):  Temp: 98.7 °F (37.1 °C) (11/20/17 1017)  Pulse: 80 (11/20/17 1017)  Resp: 16 (11/20/17 1017)  BP: 100/83 (11/20/17 1017)  SpO2: 100 % (11/20/17 1017) Vital Signs (24h  Range):  Temp:  [98.7 °F (37.1 °C)] 98.7 °F (37.1 °C)  Pulse:  [80] 80  Resp:  [16] 16  SpO2:  [100 %] 100 %  BP: (100)/(83) 100/83     Weight: 65.8 kg (145 lb)  Body mass index is 22.05 kg/m².    SpO2: 100 %       Physical Exam     PE:   General: Well developed, well nourished, No distress on room air   HEENT: Head is normocephalic, atraumatic;  Lungs: Clear to auscultation bilaterally.  No wheezing. Normal respiratory effort.  Cardiovascular:  Smooth vad hum   vad site dressing CDI.   Left ICD site pocket, clean and intact.   Extremities: No LE edema.   Abdomen: Abdomen is soft, non-tender non-distended with normal bowel sounds.  Skin: Skin color, texture, turgor normal. No rashes.  Musculoskeletal: normal range of motion   Neurologic: Normal strength and tone. No focal numbness or weakness.   Psychiatric: normal mood and affect.  behavior is normal. Alert and oriented X 3.  Labs reviewed       Significant Imaging:   ECHO   CONCLUSIONS     1 - Eccentric LVH with severely depressed left ventricular systolic function (EF 10-15%).     2 - Severe left atrial enlargement.     3 - Restrictive LV filling pattern, indicating markedly elevated LAP (grade 3 diastolic dysfunction).     4 - Mildly depressed right ventricular systolic function .     5 - Mild tricuspid regurgitation.     6 - Pulmonary hypertension. The estimated PA systolic pressure is 41 mmHg.     7 - Mild mitral regurgitation.     8 - Heartmate III LVAD; speed 5200; aortic valve opens intermittently; septum midline.     This document has been electronically    SIGNED BY: Brianna Stallworth MD On: 10/11/2017 16:53

## 2017-11-20 NOTE — ANESTHESIA PREPROCEDURE EVALUATION
11/20/2017  Suman Hayden is a 67 y.o., male.  Patient Active Problem List   Diagnosis    Nonischemic cardiomyopathy    Encounter for monitoring amiodarone therapy    Essential hypertension    Hyperlipidemia    V-tach    Elevated PSA    Hepatitis B core antibody positive since 2012    Chronic systolic congestive heart failure    Heart transplant candidate    Hyperbilirubinemia    Malfunction of implantable cardioverter-defibrillator (ICD) electrode    Syncope and collapse    Atrial tachycardia    Hyperglycemia    AICD (automatic cardioverter/defibrillator) present    LVAD (left ventricular assist device) present    Atrial fibrillation    Heart replaced by heart assist device    Anticoagulation monitoring, INR range 2-3    CHF (congestive heart failure)         Anesthesia Evaluation         Review of Systems      Physical Exam  General:  Well nourished    Airway/Jaw/Neck:  Airway Findings: Mouth Opening: Normal Tongue: Normal  General Airway Assessment: Adult  Mallampati: II  Improves to II with phonation.  TM Distance: Normal, at least 6 cm      Dental:  Dental Findings: Edentulous   Chest/Lungs:  Chest/Lungs Findings: Clear to auscultation     Heart/Vascular:  Heart Findings: vad sounds.      Mental Status:  Mental Status Findings:  Cooperative, Alert and Oriented         Anesthesia Plan  Type of Anesthesia, risks & benefits discussed:  Anesthesia Type:  MAC  Patient's Preference: Sedation  Intra-op Monitoring Plan: standard ASA monitors  Intra-op Monitoring Plan Comments:   Post Op Pain Control Plan: per primary service following discharge from PACU  Post Op Pain Control Plan Comments:   Induction:   IV  Beta Blocker:  Patient is not currently on a Beta-Blocker (No further documentation required).       Informed Consent: Patient understands risks and agrees with Anesthesia plan.   Questions answered.   ASA Score: 4     Day of Surgery Review of History & Physical:    H&P update referred to the surgeon.     Anesthesia Plan Notes: Sedation plan discussed.          Ready For Surgery From Anesthesia Perspective.

## 2017-11-20 NOTE — H&P
[facility]  Cardiac Electrophysiology  History and Physical     Admission Date: 11/20/2017  Code Status: Prior   Attending Provider: Rubén Winchester MD   Principal Problem:Malfunction of implantable cardioverter-defibrillator (ICD) electrode    Subjective:     Chief Complaint:  ICD lead revision     HPI:   67 y.o. gentleman with PMH of NICM (EF 10%) on home  , esophagitis, HTN, HLD, s/p Medtronic CRT-D who underwent LVAD placement HeartMate 3 Implanted 7/27/2017 as DT. He then had very complcated post op course including delayed chest closure (7/28/17) and extubated (7/29/17), reintubated 8/9/17 and extubated 8/13/17. Pt suffered a  cardiac arrest, developed GI bleeding,  ileus. Pt also had past ESBL bacteremia > completed Abx. Pt  Doing well post that hospital discharge 9/22/17 > follows with VAD clinic. Pt with past ICD shocks for VT.  Pt with current lifevest in place,  ICD  Lead found to have high pacing threshold and poor sensing, tachy therapy off, with plans for outpatient lead revision.   Pt now presented for planned lead revision, currently on ASA and coumadin. Pt denies any acute symptoms such as fever, chills, chest pains, over shortness of breath.                   Past Medical History:   Diagnosis Date    Cardiomyopathy      Hyperlipidemia      Hypertension      Obesity      S/P implantation of automatic cardioverter/defibrillator (AICD)      Ventricular tachycardia                 Past Surgical History:   Procedure Laterality Date    CARDIAC CATHETERIZATION        CARDIAC DEFIBRILLATOR PLACEMENT        LEFT VENTRICULAR ASSIST DEVICE   07/27/2017         Review of patient's allergies indicates:  No Known Allergies     No current facility-administered medications on file prior to encounter.            Current Outpatient Prescriptions on File Prior to Encounter   Medication Sig    amLODIPine (NORVASC) 10 MG tablet Take 1 tablet (10 mg total) by mouth once daily.     aspirin 81 MG Chew Take 81 mg by mouth once daily.    DOBUTamine (DOBUTREX) 500 mg/250 mL (2,000 mcg/mL) Inject 199.75 mcg/min into the vein continuous.    dronabinol (MARINOL) 5 MG capsule Take 1 capsule (5 mg total) by mouth 2 (two) times daily before meals.    furosemide (LASIX) 80 MG tablet Take 1 tablet (80 mg total) by mouth 2 (two) times daily.    hydrALAZINE (APRESOLINE) 50 MG tablet Take 1 tablet (50 mg total) by mouth every 8 (eight) hours.    ondansetron (ZOFRAN-ODT) 4 MG TbDL Take 1 tablet (4 mg total) by mouth every 8 (eight) hours as needed.    pantoprazole (PROTONIX) 40 MG tablet Take 1 tablet (40 mg total) by mouth once daily.    polyethylene glycol (GLYCOLAX) 17 gram/dose powder As needed    potassium chloride SA (K-DUR,KLOR-CON) 20 MEQ tablet Take 2 tablets (40 mEq total) by mouth 2 (two) times daily.    pravastatin (PRAVACHOL) 20 MG tablet Take 1 tablet (20 mg total) by mouth every evening.    warfarin (COUMADIN) 2 MG tablet Take 1 tablet (2 mg total) by mouth Daily. (Patient taking differently: Take 2 mg by mouth Daily. And ad directed)    ferrous sulfate 324 mg (65 mg iron) TbEC Take 1 tablet (324 mg total) by mouth once daily.    hydrocodone-acetaminophen 5-325mg (NORCO) 5-325 mg per tablet Take 1 tablet by mouth every 6 (six) hours as needed (Acute pain, Z95.811 left ventricular assist device placement).    spironolactone (ALDACTONE) 25 MG tablet Take 1 tablet (25 mg total) by mouth once daily.           Family History      Problem Relation (Age of Onset)     Heart disease Mother, Father               Social History Main Topics    Smoking status: Never Smoker    Smokeless tobacco: Never Used    Alcohol use No    Drug use: Unknown    Sexual activity: Not on file      ROS      Constitution: Positive for malaise/fatigue. Negative for chills, decreased appetite, diaphoresis, fever, weakness, night sweats, weight gain and weight loss.   Eyes: Negative.  Negative for blurred  vision and visual disturbance.   Cardiovascular: . Negative for chest pain, claudication, cyanosis, irregular heartbeat, leg swelling, near-syncope, orthopnea, palpitations, paroxysmal nocturnal dyspnea and syncope.   Respiratory: Negative for cough, hemoptysis, shortness of breath, sleep disturbances due to breathing, snoring and wheezing.    Endocrine: Negative.    Hematologic/Lymphatic:   Negative for bleeding problem. Does not bruise/bleed easily.   Skin: Negative for color change, dry skin,  poor wound healing and rash.   Musculoskeletal: Negative.  Negative for arthritis, joint pain and muscle weakness.   Gastrointestinal:  Negative for bloating, abdominal pain, constipation, diarrhea, hematemesis, hematochezia, melena and vomiting.   Genitourinary:  Negative for frequency and urgency.   Neurological: Negative for difficulty with concentration,  sleepiness, dizziness, headaches and light-headedness.   Psychiatric/Behavioral: Negative for depression. The patient does not have insomnia and is not nervous/anxious.       Objective:      Vital Signs (Most Recent):  Temp: 98.7 °F (37.1 °C) (11/20/17 1017)  Pulse: 80 (11/20/17 1017)  Resp: 16 (11/20/17 1017)  BP: 100/83 (11/20/17 1017)  SpO2: 100 % (11/20/17 1017) Vital Signs (24h Range):  Temp:  [98.7 °F (37.1 °C)] 98.7 °F (37.1 °C)  Pulse:  [80] 80  Resp:  [16] 16  SpO2:  [100 %] 100 %  BP: (100)/(83) 100/83      Weight: 65.8 kg (145 lb)  Body mass index is 22.05 kg/m².     SpO2: 100 %        Physical Exam      PE:   General: Well developed, well nourished, No distress on room air   HEENT: Head is normocephalic, atraumatic;  Lungs: Clear to auscultation bilaterally.  No wheezing. Normal respiratory effort.  Cardiovascular:  Smooth vad hum   vad site dressing CDI.   Left ICD site pocket, clean and intact.   Extremities: No LE edema.   Abdomen: Abdomen is soft, non-tender non-distended with normal bowel sounds.  Skin: Skin color, texture, turgor normal. No  rashes.  Musculoskeletal: normal range of motion   Neurologic: Normal strength and tone. No focal numbness or weakness.   Psychiatric: normal mood and affect.  behavior is normal. Alert and oriented X 3.  Labs reviewed         Significant Imaging:   ECHO   CONCLUSIONS     1 - Eccentric LVH with severely depressed left ventricular systolic function (EF 10-15%).     2 - Severe left atrial enlargement.     3 - Restrictive LV filling pattern, indicating markedly elevated LAP (grade 3 diastolic dysfunction).     4 - Mildly depressed right ventricular systolic function .     5 - Mild tricuspid regurgitation.     6 - Pulmonary hypertension. The estimated PA systolic pressure is 41 mmHg.     7 - Mild mitral regurgitation.     8 - Heartmate III LVAD; speed 5200; aortic valve opens intermittently; septum midline.     This document has been electronically    SIGNED BY: Brianna Stallworth MD On: 10/11/2017 16:53    Assessment and Plan:     * Malfunction of implantable cardioverter-defibrillator (ICD) electrode    ICD lead Revision, INR 1.7   Anesthesia for sedation     Prior to procedure, extensive discussion with patient regarding risks and benefits of  Lead revision  and patient  would like to proceed.  The patient and spouse Mrs Hayden  voices understanding and all questions have been answered. No further questions/concerns voiced at this time.            JAYE Lyons-BC  Cardiology Electrophysiology  NP   [facility]    Attending: MD Rubén Winchester

## 2017-11-20 NOTE — HPI
67 y.o. gentleman with PMH of NICM (EF 10%) on home  , esophagitis, HTN, HLD, s/p Medtronic CRT-D who underwent LVAD placement HeartMate 3 Implanted 7/27/2017 as DT. He then had very complcated post op course including delayed chest closure (7/28/17) and extubated (7/29/17), reintubated 8/9/17 and extubated 8/13/17. Pt suffered a  cardiac arrest, developed GI bleeding,  ileus. Pt also had past ESBL bacteremia > completed Abx. Pt  Doing well post that hospital discharge 9/22/17 > follows with VAD clinic. Pt with current lifevest in place,  ICD  Lead found to have high pacing threshold and poor sensing, tachy therapy off, with plans for outpatient lead revision.   Pt now presented for planned lead revision, currently on ASA and coumadin. Pt denies any acute symptoms such as fever, chills, chest pains, over shortness of breath.

## 2017-11-21 DIAGNOSIS — I50.9 CONGESTIVE HEART FAILURE, UNSPECIFIED CONGESTIVE HEART FAILURE CHRONICITY, UNSPECIFIED CONGESTIVE HEART FAILURE TYPE: Primary | ICD-10-CM

## 2017-11-21 DIAGNOSIS — Z95.810 CARDIAC DEFIBRILLATOR IN SITU: ICD-10-CM

## 2017-11-21 LAB
ALLENS TEST: ABNORMAL
ANION GAP SERPL CALC-SCNC: 9 MMOL/L
BASOPHILS # BLD AUTO: 0.05 K/UL
BASOPHILS NFR BLD: 1 %
BUN SERPL-MCNC: 11 MG/DL
CALCIUM SERPL-MCNC: 9 MG/DL
CHLORIDE SERPL-SCNC: 103 MMOL/L
CO2 SERPL-SCNC: 26 MMOL/L
CREAT SERPL-MCNC: 1 MG/DL
DELSYS: ABNORMAL
DIASTOLIC DYSFUNCTION: YES
DIFFERENTIAL METHOD: ABNORMAL
EOSINOPHIL # BLD AUTO: 0.6 K/UL
EOSINOPHIL NFR BLD: 10.9 %
ERYTHROCYTE [DISTWIDTH] IN BLOOD BY AUTOMATED COUNT: 16 %
ERYTHROCYTE [SEDIMENTATION RATE] IN BLOOD BY WESTERGREN METHOD: 16 MM/H
EST. GFR  (AFRICAN AMERICAN): >60 ML/MIN/1.73 M^2
EST. GFR  (NON AFRICAN AMERICAN): >60 ML/MIN/1.73 M^2
ESTIMATED PA SYSTOLIC PRESSURE: 41.42
FIO2: 21
GLUCOSE SERPL-MCNC: 64 MG/DL
HCO3 UR-SCNC: 29.6 MMOL/L (ref 24–28)
HCT VFR BLD AUTO: 26 %
HGB BLD-MCNC: 8.3 G/DL
IMM GRANULOCYTES # BLD AUTO: 0.02 K/UL
IMM GRANULOCYTES NFR BLD AUTO: 0.4 %
INR PPP: 1.5
LDH SERPL L TO P-CCNC: 159 U/L
LYMPHOCYTES # BLD AUTO: 1.1 K/UL
LYMPHOCYTES NFR BLD: 22.3 %
MAGNESIUM SERPL-MCNC: 1.9 MG/DL
MCH RBC QN AUTO: 25.2 PG
MCHC RBC AUTO-ENTMCNC: 31.9 G/DL
MCV RBC AUTO: 79 FL
MITRAL VALVE REGURGITATION: ABNORMAL
MODE: ABNORMAL
MONOCYTES # BLD AUTO: 0.6 K/UL
MONOCYTES NFR BLD: 10.9 %
NEUTROPHILS # BLD AUTO: 2.7 K/UL
NEUTROPHILS NFR BLD: 54.5 %
NRBC BLD-RTO: 0 /100 WBC
PCO2 BLDA: 45.8 MMHG (ref 35–45)
PH SMN: 7.42 [PH] (ref 7.35–7.45)
PHOSPHATE SERPL-MCNC: 3.6 MG/DL
PLATELET # BLD AUTO: 220 K/UL
PMV BLD AUTO: 10.9 FL
PO2 BLDA: 28 MMHG (ref 40–60)
POC BE: 5 MMOL/L
POC SATURATED O2: 54 % (ref 95–100)
POC TCO2: 31 MMOL/L (ref 24–29)
POTASSIUM SERPL-SCNC: 3.8 MMOL/L
PROTHROMBIN TIME: 15.6 SEC
RBC # BLD AUTO: 3.29 M/UL
RETIRED EF AND QEF - SEE NOTES: 13 (ref 55–65)
SAMPLE: ABNORMAL
SITE: ABNORMAL
SODIUM SERPL-SCNC: 138 MMOL/L
TRICUSPID VALVE REGURGITATION: ABNORMAL
WBC # BLD AUTO: 5.03 K/UL

## 2017-11-21 PROCEDURE — 83735 ASSAY OF MAGNESIUM: CPT

## 2017-11-21 PROCEDURE — 25000003 PHARM REV CODE 250: Performed by: INTERNAL MEDICINE

## 2017-11-21 PROCEDURE — 63600175 PHARM REV CODE 636 W HCPCS: Performed by: NURSE PRACTITIONER

## 2017-11-21 PROCEDURE — 93005 ELECTROCARDIOGRAM TRACING: CPT

## 2017-11-21 PROCEDURE — 84100 ASSAY OF PHOSPHORUS: CPT

## 2017-11-21 PROCEDURE — 82803 BLOOD GASES ANY COMBINATION: CPT

## 2017-11-21 PROCEDURE — 85025 COMPLETE CBC W/AUTO DIFF WBC: CPT

## 2017-11-21 PROCEDURE — 27000248 HC VAD-ADDITIONAL DAY

## 2017-11-21 PROCEDURE — 63600175 PHARM REV CODE 636 W HCPCS: Performed by: PHYSICIAN ASSISTANT

## 2017-11-21 PROCEDURE — 63600175 PHARM REV CODE 636 W HCPCS: Performed by: INTERNAL MEDICINE

## 2017-11-21 PROCEDURE — 97161 PT EVAL LOW COMPLEX 20 MIN: CPT

## 2017-11-21 PROCEDURE — 99223 1ST HOSP IP/OBS HIGH 75: CPT | Mod: ,,, | Performed by: INTERNAL MEDICINE

## 2017-11-21 PROCEDURE — 93750 INTERROGATION VAD IN PERSON: CPT | Mod: ,,, | Performed by: INTERNAL MEDICINE

## 2017-11-21 PROCEDURE — 97165 OT EVAL LOW COMPLEX 30 MIN: CPT

## 2017-11-21 PROCEDURE — 85610 PROTHROMBIN TIME: CPT

## 2017-11-21 PROCEDURE — 80048 BASIC METABOLIC PNL TOTAL CA: CPT

## 2017-11-21 PROCEDURE — 83615 LACTATE (LD) (LDH) ENZYME: CPT

## 2017-11-21 PROCEDURE — 93010 ELECTROCARDIOGRAM REPORT: CPT | Mod: ,,, | Performed by: INTERNAL MEDICINE

## 2017-11-21 PROCEDURE — 11000001 HC ACUTE MED/SURG PRIVATE ROOM

## 2017-11-21 PROCEDURE — G8987 SELF CARE CURRENT STATUS: HCPCS | Mod: CJ

## 2017-11-21 PROCEDURE — 99231 SBSQ HOSP IP/OBS SF/LOW 25: CPT | Mod: ,,, | Performed by: INTERNAL MEDICINE

## 2017-11-21 PROCEDURE — G8988 SELF CARE GOAL STATUS: HCPCS | Mod: CI

## 2017-11-21 PROCEDURE — 36415 COLL VENOUS BLD VENIPUNCTURE: CPT

## 2017-11-21 PROCEDURE — 93306 TTE W/DOPPLER COMPLETE: CPT | Mod: 26,,, | Performed by: INTERNAL MEDICINE

## 2017-11-21 PROCEDURE — 93750 INTERROGATION VAD IN PERSON: CPT | Performed by: INTERNAL MEDICINE

## 2017-11-21 PROCEDURE — 93306 TTE W/DOPPLER COMPLETE: CPT

## 2017-11-21 RX ORDER — WARFARIN 3 MG/1
6 TABLET ORAL
Status: DISCONTINUED | OUTPATIENT
Start: 2017-11-21 | End: 2017-11-21

## 2017-11-21 RX ORDER — WARFARIN 2 MG/1
2 TABLET ORAL
Status: DISCONTINUED | OUTPATIENT
Start: 2017-11-23 | End: 2017-11-22 | Stop reason: HOSPADM

## 2017-11-21 RX ORDER — WARFARIN 3 MG/1
3 TABLET ORAL
Status: DISCONTINUED | OUTPATIENT
Start: 2017-11-22 | End: 2017-11-21

## 2017-11-21 RX ORDER — WARFARIN 2 MG/1
2 TABLET ORAL
Status: DISCONTINUED | OUTPATIENT
Start: 2017-11-25 | End: 2017-11-22 | Stop reason: HOSPADM

## 2017-11-21 RX ORDER — WARFARIN SODIUM 5 MG/1
5 TABLET ORAL ONCE
Status: COMPLETED | OUTPATIENT
Start: 2017-11-21 | End: 2017-11-21

## 2017-11-21 RX ORDER — WARFARIN 2 MG/1
2 TABLET ORAL
Status: DISCONTINUED | OUTPATIENT
Start: 2017-11-22 | End: 2017-11-22 | Stop reason: HOSPADM

## 2017-11-21 RX ORDER — CEFAZOLIN SODIUM 1 G/50ML
1 SOLUTION INTRAVENOUS
Status: DISCONTINUED | OUTPATIENT
Start: 2017-11-21 | End: 2017-11-22 | Stop reason: HOSPADM

## 2017-11-21 RX ADMIN — PRAVASTATIN SODIUM 20 MG: 20 TABLET ORAL at 09:11

## 2017-11-21 RX ADMIN — FUROSEMIDE 80 MG: 80 TABLET ORAL at 09:11

## 2017-11-21 RX ADMIN — HYDROCODONE BITARTRATE AND ACETAMINOPHEN 1 TABLET: 5; 325 TABLET ORAL at 09:11

## 2017-11-21 RX ADMIN — POLYETHYLENE GLYCOL 3350 17 G: 17 POWDER, FOR SOLUTION ORAL at 08:11

## 2017-11-21 RX ADMIN — WARFARIN SODIUM 2 MG: 2 TABLET ORAL at 12:11

## 2017-11-21 RX ADMIN — HYDRALAZINE HYDROCHLORIDE 50 MG: 50 TABLET ORAL at 05:11

## 2017-11-21 RX ADMIN — WARFARIN SODIUM 5 MG: 5 TABLET ORAL at 04:11

## 2017-11-21 RX ADMIN — AMLODIPINE BESYLATE 10 MG: 10 TABLET ORAL at 08:11

## 2017-11-21 RX ADMIN — POTASSIUM CHLORIDE 40 MEQ: 1500 TABLET, EXTENDED RELEASE ORAL at 09:11

## 2017-11-21 RX ADMIN — CEFAZOLIN SODIUM 1 G: 1 SOLUTION INTRAVENOUS at 09:11

## 2017-11-21 RX ADMIN — POTASSIUM CHLORIDE 40 MEQ: 1500 TABLET, EXTENDED RELEASE ORAL at 12:11

## 2017-11-21 RX ADMIN — FUROSEMIDE 80 MG: 80 TABLET ORAL at 08:11

## 2017-11-21 RX ADMIN — DRONABINOL 5 MG: 2.5 CAPSULE ORAL at 05:11

## 2017-11-21 RX ADMIN — MUPIROCIN 1 G: 20 OINTMENT TOPICAL at 09:11

## 2017-11-21 RX ADMIN — PANTOPRAZOLE SODIUM 40 MG: 40 TABLET, DELAYED RELEASE ORAL at 08:11

## 2017-11-21 RX ADMIN — ASPIRIN 81 MG CHEWABLE TABLET 81 MG: 81 TABLET CHEWABLE at 08:11

## 2017-11-21 RX ADMIN — HYDRALAZINE HYDROCHLORIDE 50 MG: 50 TABLET ORAL at 09:11

## 2017-11-21 RX ADMIN — POTASSIUM CHLORIDE 40 MEQ: 1500 TABLET, EXTENDED RELEASE ORAL at 08:11

## 2017-11-21 RX ADMIN — CEFAZOLIN SODIUM 1 G: 1 SOLUTION INTRAVENOUS at 04:11

## 2017-11-21 RX ADMIN — HYDRALAZINE HYDROCHLORIDE 50 MG: 50 TABLET ORAL at 01:11

## 2017-11-21 RX ADMIN — CEFAZOLIN SODIUM 1 G: 1 SOLUTION INTRAVENOUS at 01:11

## 2017-11-21 RX ADMIN — DRONABINOL 5 MG: 2.5 CAPSULE ORAL at 04:11

## 2017-11-21 RX ADMIN — ACETAMINOPHEN 325 MG: 325 TABLET ORAL at 08:11

## 2017-11-21 RX ADMIN — ALTEPLASE 2 MG: 2.2 INJECTION, POWDER, LYOPHILIZED, FOR SOLUTION INTRAVENOUS at 10:11

## 2017-11-21 NOTE — SUBJECTIVE & OBJECTIVE
Interval History:  stopped overnight, no SOB. Has some pain from ICD site     Continuous Infusions:   sodium chloride 0.9% Stopped (11/20/17 2030)     Scheduled Meds:   alteplase  2 mg Intra-Catheter Once    amLODIPine  10 mg Oral Daily    aspirin  81 mg Oral Daily    ceFAZolin (ANCEF) IVPB  1 g Intravenous Q12H    dronabinol  5 mg Oral BID AC    furosemide  80 mg Oral BID    hydrALAZINE  50 mg Oral Q8H    mupirocin  1 g Nasal BID    pantoprazole  40 mg Oral Daily    polyethylene glycol  17 g Oral Daily    potassium chloride SA  40 mEq Oral BID    pravastatin  20 mg Oral QHS    [START ON 11/22/2017] warfarin  2 mg Oral Every Mon, Wed, Fri    [START ON 11/25/2017] warfarin  2 mg Oral Every Sat, Sun    [START ON 11/23/2017] warfarin  2 mg Oral Every Thurs    warfarin  3 mg Oral Every Tues     PRN Meds:acetaminophen, hydrocodone-acetaminophen 5-325mg, ondansetron    Review of patient's allergies indicates:  No Known Allergies  Objective:     Vital Signs (Most Recent):  Temp: 97.7 °F (36.5 °C) (11/21/17 0828)  Pulse: 79 (11/21/17 0828)  Resp: 16 (11/21/17 0828)  BP: (!) 82/0 (11/21/17 0828)  SpO2: 97 % (11/21/17 0828) Vital Signs (24h Range):  Temp:  [97.7 °F (36.5 °C)-99 °F (37.2 °C)] 97.7 °F (36.5 °C)  Pulse:  [78-82] 79  Resp:  [10-19] 16  SpO2:  [97 %-100 %] 97 %  BP: ()/(0-83) 82/0     Patient Vitals for the past 72 hrs (Last 3 readings):   Weight   11/21/17 0700 63.8 kg (140 lb 10.5 oz)   11/20/17 1017 65.8 kg (145 lb)     Body mass index is 21.39 kg/m².      Intake/Output Summary (Last 24 hours) at 11/21/17 0840  Last data filed at 11/21/17 0600   Gross per 24 hour   Intake              910 ml   Output              500 ml   Net              410 ml       Hemodynamic Parameters:           Physical Exam   Constitutional: He appears well-developed and well-nourished. No distress.   HENT:   Head: Normocephalic.   Eyes: Pupils are equal, round, and reactive to light.   Neck: Normal range of  motion. No JVD present.   Cardiovascular: Normal rate and regular rhythm.  Exam reveals no friction rub.    No murmur heard.  Pulmonary/Chest: Effort normal. No respiratory distress. He has no wheezes.   Abdominal: Soft. He exhibits no distension. There is no tenderness.   Musculoskeletal: Normal range of motion.   Neurological: He is alert.   Skin: Skin is warm. He is not diaphoretic.       Significant Labs:  CBC:    Recent Labs  Lab 11/16/17 1132 11/20/17 0918 11/21/17 0437   WBC 4.59 4.58 5.03   RBC 3.90* 3.75* 3.29*   HGB 9.9* 9.4* 8.3*   HCT 30.8* 29.4* 26.0*    249 220   MCV 79* 78* 79*   MCH 25.4* 25.1* 25.2*   MCHC 32.1 32.0 31.9*     BNP:    Recent Labs  Lab 11/20/17 0918   *     CMP:    Recent Labs  Lab 11/16/17 1132 11/20/17 0918 11/21/17 0437    77 64*   CALCIUM 9.6 9.5 9.0   ALBUMIN  --  3.2*  --    PROT  --  7.4  --     137 138   K 3.8 3.4* 3.8   CO2 25 28 26    99 103   BUN 11 11 11   CREATININE 0.9 1.1 1.0   ALKPHOS  --  68  --    ALT  --  8*  --    AST  --  17  --    BILITOT  --  0.8  --       Coagulation:     Recent Labs  Lab 11/16/17  1132 11/20/17 0918 11/21/17 0437   INR 2.0* 1.7* 1.5*   APTT 34.0*  --   --      LDH:    Recent Labs  Lab 11/20/17 0918 11/21/17 0437    159     Microbiology:  Microbiology Results (last 7 days)     ** No results found for the last 168 hours. **          I have reviewed all pertinent labs within the past 24 hours.    Estimated Creatinine Clearance: 64.7 mL/min (based on SCr of 1 mg/dL).    Diagnostic Results:  I have reviewed and interpreted all pertinent imaging results/findings within the past 24 hours.

## 2017-11-21 NOTE — PROCEDURES
Patient awake with family at bedside. VAD interrogation completed this AM in the event changes needed to be made. Will continue to monitor for further issues.     Pulsatile: Yes, intermittent   VAD Sounds: HM3  Smooth  Problems / Issues / Alarms with VAD if any: None noted  HCT: 26       VAD Interrogation:  TXP LVAD INTERROGATIONS 11/21/2017 11/21/2017 11/21/2017 11/20/2017 11/20/2017 11/20/2017 11/20/2017   Type HeartMate3 HeartMate3 HeartMate3 HeartMate3 - HeartMate3 HeartMate3   Flow 4.1 3.7 3.9 3.9 4.0 4.0 4.0   Speed 5200 5200 5200 5200 5200 5200 5250   PI 3.5 4.8 3.7 4.0 3.6 3.5 3.4   Power ([place]) 3.6 3.5 3.5 3.5 3.6 3.6 3.6   LSL - - - 4800 - - -   Pulsatility - Pulse Pulse Pulse - - -   Some recent data might be hidden   }

## 2017-11-21 NOTE — ANESTHESIA POSTPROCEDURE EVALUATION
"Anesthesia Post Evaluation    Patient: Suman Hayden    Procedure(s) Performed: Procedure(s) (LRB):  REVISION-LEAD-ICD (N/A)    Final Anesthesia Type: general  Patient location during evaluation: telemetry step down  Patient participation: Yes- Able to Participate  Level of consciousness: awake and alert  Post-procedure vital signs: reviewed and stable  Pain management: adequate  Airway patency: patent  PONV status at discharge: No PONV  Anesthetic complications: no      Cardiovascular status: hemodynamically stable  Respiratory status: unassisted  Hydration status: euvolemic  Follow-up not needed.        Visit Vitals  BP (!) 80/0 (BP Location: Left arm, Patient Position: Lying)   Pulse 79   Temp 36.4 °C (97.6 °F) (Oral)   Resp 18   Ht 5' 8" (1.727 m)   Wt 63.8 kg (140 lb 10.5 oz)   SpO2 98%   BMI 21.39 kg/m²       Pain/Gurpreet Score: Pain Assessment Performed: Yes (11/21/2017  7:16 AM)  Presence of Pain: complains of pain/discomfort (11/21/2017  8:48 AM)  Pain Rating Prior to Med Admin: 5 (11/21/2017  8:48 AM)  Pain Rating Post Med Admin: 2 (11/20/2017 10:46 PM)  Gurpreet Score: 10 (11/20/2017  6:42 PM)      "

## 2017-11-21 NOTE — PROGRESS NOTES
[facility]  Cardiothoracic Surgery  Progress Note    Patient Name: Suman Hayden  MRN: [id number]  Admission Date: 11/20/2017  Hospital Length of Stay: 1 days  Code Status: Full Code   Attending Physician: Rubén Winchester MD   Referring Provider: Angie Torres MD  Principal Problem:Malfunction of implantable cardioverter-defibrillator (ICD) electrode         Post-Op Info:  Procedure(s) (LRB):  REVISION-LEAD-ICD (N/A)   1 Day Post-Op       Assessment/Plan:     LVAD (left ventricular assist device) present    Daily E and M and VAD Interrogation Note    Reason for Visit:  Patient is seen in follow up for management of:  [] HeartMate 3        Interval History:   The [x] implant date was 7/27/17  Events overnight reviewed     Review of Systems:    All other systems reviewed and negative    Medications:  Current Facility-Administered Medications   Medication Dose Route Frequency Provider Last Rate Last Dose    0.9%  NaCl infusion   Intravenous Continuous RAMON Grace   Stopped at 11/20/17 2030    acetaminophen tablet 325 mg  325 mg Oral Q4H PRN Rubén Winchester MD   325 mg at 11/21/17 0848    alteplase injection 2 mg  2 mg Intra-Catheter Once MELISSA Wilson        amLODIPine tablet 10 mg  10 mg Oral Daily Kimmie Solis MD   10 mg at 11/21/17 0841    aspirin chewable tablet 81 mg  81 mg Oral Daily Kimmie Solis MD   81 mg at 11/21/17 0841    ceFAZolin (ANCEF) 1 gram in dextrose 5 % 50 mL IVPB (premix)  1 g Intravenous Q12H Rubén Winchester  mL/hr at 11/21/17 0443 1 g at 11/21/17 0443    dronabinol capsule 5 mg  5 mg Oral BID AC Kimmie Solis MD   5 mg at 11/21/17 0551    furosemide tablet 80 mg  80 mg Oral BID Kimmie Solis MD   80 mg at 11/21/17 0841    hydrALAZINE tablet 50 mg  50 mg Oral Q8H Kimmie Solis MD   50 mg at 11/21/17 0551    hydrocodone-acetaminophen 5-325mg per tablet 1 tablet  1 tablet Oral Q4H PRN Rubén  TIERNEY Winchester MD        mupirocin 2 % ointment 1 g  1 g Nasal BID Kimmie Solis MD   1 g at 11/20/17 2146    ondansetron disintegrating tablet 4 mg  4 mg Oral Q8H PRN Kimmie Solis MD        pantoprazole EC tablet 40 mg  40 mg Oral Daily Kimmie Solis MD   40 mg at 11/21/17 0841    polyethylene glycol packet 17 g  17 g Oral Daily Kimmie Solis MD   17 g at 11/21/17 0840    potassium chloride SA CR tablet 40 mEq  40 mEq Oral BID Kimmie Solis MD   40 mEq at 11/21/17 0841    pravastatin tablet 20 mg  20 mg Oral QHS Kimmie Solis MD        [START ON 11/22/2017] warfarin (COUMADIN) tablet 2 mg  2 mg Oral Every Mon, Wed, Fri Kimmie Solis MD        [START ON 11/25/2017] warfarin (COUMADIN) tablet 2 mg  2 mg Oral Every Sat, Sun Kimmie Solis MD        [START ON 11/23/2017] warfarin (COUMADIN) tablet 2 mg  2 mg Oral Every Thurs Kimmie Solis MD        warfarin (COUMADIN) tablet 3 mg  3 mg Oral Every Tues Kimmie Solis MD           Physical Examination:  Vital Signs:   Vitals:    11/21/17 0828   BP: (!) 82/0   Pulse: 79   Resp: 16   Temp: 97.7 °F (36.5 °C)     Cardiovascular:  [x] Regular rate and rhythm [] Irregular []  None (MATT) []  Other  [x]  No edema []  Edema present  [x]  Clear to auscultation  VAD sounds smooth  Skin:  Incision is [x]  Clean, dry and intact.    Sternum:  [x]  Stable   Driveline(s):   [x]  Clean, dry and intact.         Procedure:  Device Interrogation including analysis of device parameters.  Current Settings     Ventricular Assist Device  []  Total Artificial Heart interrogated  Review of device function is [x]  Stable     TXP LVAD INTERROGATIONS 11/21/2017 11/21/2017 11/21/2017 11/20/2017 11/20/2017 11/20/2017 11/20/2017   Type HeartMate3 HeartMate3 HeartMate3 HeartMate3 - HeartMate3 HeartMate3   Flow 4.1 3.7 3.9 3.9 4.0 4.0 4.0   Speed 5200 5200 5200 5200 5200 5200 5250   PI 3.5 4.8 3.7 4.0 3.6 3.5 3.4   Power ([place]) 3.6 3.5  3.5 3.5 3.6 3.6 3.6   LSL - - - 4800 - - -   Pulsatility - Pulse Pulse Pulse - - -   Some recent data might be hidden       Assessment:  [x]  Primary Cardiomyopathy [x]  Congestive Heart Failure   []  Atrial Fibrillation []  Ventricular Tachycardia   []  Aftercare cardiac device [x]  Long term (current) use of anticoagulants   []  Ventilator-associated pneumonia []  Pneumonia viral, unspecified   []  Pneumonia, bacterial, unspecified []  Pneumonia, organism unspecified   []  Hemorrhage of GI tract, unspecified    []  Nosebleed        RHC tomorrow off Dobutamine      Plan:  [x]  Interval history obtained from ICU attending team member during rounding today  [x]  VAD/MATT teaching performed with patient  [x]  Mobilization / Physical Therapy ongoing  [x]  Anticoagulation []  Ongoing [x]  Held        Total time spent was 36 minutes.  Of which more than 50 percent of the care dominated counseling and coordinating care with different team members. The VAD was interrogated and all parameters were WNL and no significant findings were found in the history. All these findings are documented in the note above.      Date of Service: 11/21/2017                           Nadia Caldera NP  Cardiothoracic Surgery  [facility]

## 2017-11-21 NOTE — PLAN OF CARE
Problem: Occupational Therapy Goal  Goal: Occupational Therapy Goal  Outcome: Outcome(s) achieved Date Met: 11/21/17  Jessi and D/C OT 11/21/17

## 2017-11-21 NOTE — PROGRESS NOTES
Admit Note     Met with patient to assess needs. Patient is a 67 y.o.  male, admitted for malfunction of ICD.  Pt received LVAD 7/17.   The pt's wife and daughter do the LVAD dressing changes.      Patient admitted from home on 11/20/2017 .  At this time, patient presents as alert and oriented x 4, pleasant and good eye contact.  At this time, patients caregiver is not currently in attendance.     Household/Family Systems     Patient resides with patient's spouse and adult son, at    [address] [place] [address]    However pt has been staying with and will D/C to daughter's home:     [address]  [place] [address].      Support system includes spouse, adult children, extended family and many friends.    Patient does not have dependents that are need of being cared for.     Patients primary caregiver is Kia Hayden, pt's spouse and Rayna pt's daughter.   Pt's home phone:  [phone]  Pt's cell: [phone]  Kia Hayden (spouse) [phone]  Rayna (44 yr old daughter, cares for pt and lives in [place] with pt) [phone]  Additional emergency contacts:  Boni Hayden (son, lives with pt and works at [employer]) [phone]  Artie Hayden ( nephew, lives in [place]) [phone]    During admission, patient's caregiver plans to stay at home.  Confirmed patient and patients caregivers do have access to reliable transportation.    Cognitive Status/Learning     Patient reports reading ability as 9th grade and states patient does not have difficulty with seeing, hearing, comprehension, learning and memory. The pt does have difficulty with reading and writing.  The pt's spouse assists with reading and writing.   Patient reports patient learns best by one on one.   Needed: No.   Highest education level: High School (9-12) or GED    Vocation/Disability   .  Working for Income: No  If no, reason not working: Patient Choice - Retired    Patient is retired form the [employer]  [employer] in .  The pt was a .   The pt's spouse was working as a PCA until 2017, however pt's spouse now cares for pt.     Adherence     Patient reports a high level of adherence to patients health care regimen.  Adherence counseling and education provided. Patient verbalizes understanding.    Substance Use    Patient reports the following substance usage.    Tobacco: none, patient denies any use.  Alcohol: none, patient denies any use.  Illicit Drugs/Non-prescribed Medications: none, patient denies any use.  Patient states clear understanding of the potential impact of substance use.  Substance abstinence/cessation counseling, education and resources provided and reviewed.     Services Utilizing/ADLS    Infusion Service: Prior to admission, patient utilizing? yes Pt receiving  from [facility], however pt reports he is currently not receiving  in the hospital and he hopes he will no longer require same when discharged.  If home IV services are needed the pt would like to use [facility].  Home Health: Prior to admission, patient utilizing? yes [facility] [phone].  Pt would like to continue with  services for lab work with current  co.  DME: Prior to admission, yes [place].  Pt reports he uses same when not at home.  Pt has a W/C but is not currently using same.  Pulmonary/Cardiac Rehab: Prior to admission, no  Dialysis:  Prior to admission, no  Transplant Specialty Pharmacy:  Prior to admission, no.    Prior to admission, patient reports patient was mostly independent with ADLS and was driving. Spouse and extended family also drive.  Patient reports patient is not able to care for self at this time due to compromised medical condition (as documented in medical record) and physical weakness..  Patient indicates a willingness to care for self once medically cleared to do so.    Insurance/Medications    Insured by   Payor/Plan Subscr  Sex Relation Sub. Ins. ID  Effective Group Num   1. MEDICARE - ME* MIRTA LOPEZ 1950 Male  [id number] 6/1/15                                    [address]   2. BLUE CROSS BL* MIRTA LOPEZ 1950 Male  [id number] 1/[birth year] [id number]                                   [address] [address]      Primary Insurance (for UNOS reporting): Public Insurance - Medicare FFS (Fee For Service)  Secondary Insurance (for UNOS reporting): Private Insurance    Patient reports patient is able to obtain and afford medications at this time and at time of discharge.    Living Will/Healthcare Power of     Patient states patient does not have a LW and/or HCPA.   provided education regarding LW and HCPA and the completion of forms.    Coping/Mental Health    Patient is coping adequately with the aid of  family members, friends and kayli. .  Patient denies mental health difficulties. Worker provided general support.     Discharge Planning    At time of discharge, patient plans to return to daughter's home in [place], [place] under the care of self, daughter and spouse.  Patients famly will transport patient.  Per rounds today, expected discharge date has not been medically determined at this time. Patient and patients caregiver  verbalize understanding and are involved in treatment planning and discharge process.    Additional Concerns    Patient is being followed for needs, education, resources, information, emotional support, supportive counseling, and for supportive and skilled discharge plan of care.  providing ongoing psychosocial support, education, resources and d/c planning as needed.  SW remains available. Patient denies additional needs and/or concerns at this time. Patient verbalizes understanding and agreement with information reviewed, social work availability, and how to access available resources as needed.

## 2017-11-21 NOTE — PROGRESS NOTES
Pt arrived on the floor this evening from PACU.  Pt seen and examined.      EKG shows atrial pacing at rate of 80.  However, EKGs show long duration between atrial pacer spike (after T wave) and beginning of QRS.  I called EP about this and Dr Palma states that he interrogated the pacemaker about an hour before (~8pm) and discussed the findings with Dr. Winchester and that pacemaker function well.  No note about this yet.       Pt was on a dobutamine concentration of 2.5 mcg/kg/min at home.  Dobutamine stopped as pt here to be weaned and then RHC tomorrow.  RHC ordered and message left for Chiara.      Because pt had ICD lead revision yesterday, EP does not want heparin drip started (no note but discussed this with 1st on call cardiology fellow, Dr. Barajas, who discussed this with Dr. Nickerson).  Although RHC tomorrow, we opted to give coumadin dose since INR 1.7 and couldn't bridge with heparin.   Note higher coumadin dose on Tuesdays per last coumadin clinic note.    Plan discussed with Dr. Bautista.          Kimmie Solis MD  Cardiology Hospitalist  [facility]  SpectraLink: [phone]

## 2017-11-21 NOTE — HOSPITAL COURSE
11/21/17 > doing well s/p lead revision. Left chest site with aquacel > no overt bleeding or hematoma to site.

## 2017-11-21 NOTE — SUBJECTIVE & OBJECTIVE
Subjective:     Post-Op Info:  Procedure(s) (LRB):  REVISION-LEAD-ICD (N/A)   1 Day Post-Op          Medications:  Continuous Infusions:   sodium chloride 0.9% Stopped (11/20/17 2030)     Scheduled Meds:   alteplase  2 mg Intra-Catheter Once    amLODIPine  10 mg Oral Daily    aspirin  81 mg Oral Daily    ceFAZolin (ANCEF) IVPB  1 g Intravenous Q12H    dronabinol  5 mg Oral BID AC    furosemide  80 mg Oral BID    hydrALAZINE  50 mg Oral Q8H    mupirocin  1 g Nasal BID    pantoprazole  40 mg Oral Daily    polyethylene glycol  17 g Oral Daily    potassium chloride SA  40 mEq Oral BID    pravastatin  20 mg Oral QHS    [START ON 11/22/2017] warfarin  2 mg Oral Every Mon, Wed, Fri    [START ON 11/25/2017] warfarin  2 mg Oral Every Sat, Sun    [START ON 11/23/2017] warfarin  2 mg Oral Every Thurs    warfarin  3 mg Oral Every Tues     PRN Meds:acetaminophen, hydrocodone-acetaminophen 5-325mg, ondansetron     Objective:     Vital Signs (Most Recent):  Temp: 97.7 °F (36.5 °C) (11/21/17 0828)  Pulse: 79 (11/21/17 0828)  Resp: 16 (11/21/17 0828)  BP: (!) 82/0 (11/21/17 0828)  SpO2: 97 % (11/21/17 0828) Vital Signs (24h Range):  Temp:  [97.7 °F (36.5 °C)-99 °F (37.2 °C)] 97.7 °F (36.5 °C)  Pulse:  [78-82] 79  Resp:  [10-19] 16  SpO2:  [97 %-100 %] 97 %  BP: ()/(0-83) 82/0       Intake/Output Summary (Last 24 hours) at 11/21/17 1013  Last data filed at 11/21/17 0600   Gross per 24 hour   Intake              910 ml   Output              500 ml   Net              410 ml       Physical Exam   Constitutional: He is oriented to person, place, and time. He appears well-developed and well-nourished.   HENT:   Head: Normocephalic and atraumatic.   Eyes: EOM are normal. Pupils are equal, round, and reactive to light.   Neck: Normal range of motion. Neck supple.   Pulmonary/Chest: Effort normal and breath sounds normal.   Abdominal: Soft. Bowel sounds are normal.   Musculoskeletal: Normal range of motion.    Neurological: He is alert and oriented to person, place, and time.       Significant Labs:  BMP:   Recent Labs  Lab 11/21/17 0437   GLU 64*      K 3.8      CO2 26   BUN 11   CREATININE 1.0   CALCIUM 9.0   MG 1.9     CBC:   Recent Labs  Lab 11/21/17 0437   WBC 5.03   RBC 3.29*   HGB 8.3*   HCT 26.0*      MCV 79*   MCH 25.2*   MCHC 31.9*     Coagulation:   Recent Labs  Lab 11/21/17 0437   INR 1.5*         TXP LVAD INTERROGATIONS 11/21/2017 11/21/2017 11/21/2017 11/20/2017 11/20/2017 11/20/2017 11/20/2017   Type HeartMate3 HeartMate3 HeartMate3 HeartMate3 - HeartMate3 HeartMate3   Flow 4.1 3.7 3.9 3.9 4.0 4.0 4.0   Speed 5200 5200 5200 5200 5200 5200 5250   PI 3.5 4.8 3.7 4.0 3.6 3.5 3.4   Power ([place]) 3.6 3.5 3.5 3.5 3.6 3.6 3.6   LSL - - - 4800 - - -   Pulsatility - Pulse Pulse Pulse - - -   Some recent data might be hidden

## 2017-11-21 NOTE — PROGRESS NOTES
[facility]  Cardiac Electrophysiology  Progress Note    Admission Date: 11/20/2017  Code Status: Full Code   Attending Physician: Rubén Winchester MD   Expected Discharge Date:   Principal Problem:Malfunction of implantable cardioverter-defibrillator (ICD) electrode    Subjective:     Interval History:  11/21/17 > doing well s/p lead revision. Left chest site with aquacel > no overt bleeding or hematoma to site.    cardiac telemetry with no acute arrhythmias . Per HTS > RHC likely tomorrow. Pt denies any acute symptoms at present.     Review of Systems   Constitution: Negative for chills, fever and weakness.   HENT: Negative for stridor.    Cardiovascular: Negative for chest pain, irregular heartbeat, leg swelling, palpitations and syncope.   Respiratory: Negative for cough, shortness of breath and wheezing.    Endocrine: Negative for polydipsia, polyphagia and polyuria.   Hematologic/Lymphatic: Negative for bleeding problem at present  Skin: Negative for rash.   Musculoskeletal: Negative for muscle weakness and myalgias.   Gastrointestinal: Negative for abdominal pain, change in bowel habit, bowel incontinence, excessive appetite and heartburn.   Genitourinary: Negative for frequency and hematuria.   Neurological: Negative for dizziness, loss of balance and numbness.   Psychiatric/Behavioral: Negative for depression. The patient does not have insomnia.      Objective:     Vital Signs (Most Recent):  Temp: 97.7 °F (36.5 °C) (11/21/17 0828)  Pulse: 79 (11/21/17 0828)  Resp: 16 (11/21/17 0828)  BP: (!) 82/0 (11/21/17 0828)  SpO2: 97 % (11/21/17 0828) Vital Signs (24h Range):  Temp:  [97.7 °F (36.5 °C)-99 °F (37.2 °C)] 97.7 °F (36.5 °C)  Pulse:  [78-82] 79  Resp:  [10-19] 16  SpO2:  [97 %-100 %] 97 %  BP: ()/(0-83) 82/0     Weight: 63.8 kg (140 lb 10.5 oz)  Body mass index is 21.39 kg/m².     SpO2: 97 %  O2 Device (Oxygen Therapy): room air    Physical Exam   Constitutional: He is oriented  to person, place, and time. He appears well-developed.   HENT:   Head: Normocephalic.   Neck: Normal range of motion.   CV: vad hum  Left pectoral ICD site clean and intact, dressing in place, no bleeding or hematoma noted   Pulmonary/Chest: Effort normal and breath sounds normal.   Abdominal: Soft. Bowel sounds are normal.   Neurological: He is oriented to person, place, and time.   Skin: Skin is warm and dry.       Significant Labs: reviewed   Significant Imaging: reviewed     Assessment and Plan:     * Malfunction of implantable cardioverter-defibrillator (ICD) electrode     s/p ICD lead revision > 11/20/17  Continue IV ancef while in hospital, prior to discharge transition to po duricef 500 mg q12 for 5 days   Aquacel dressing to remain in place until wound check appointment in 1 weeks   Device clinic follow up in 1 week for wound check, and 3 months clinic follow up  with MD Annabella Montana               EP will sign off, please call the EP team for any questions or concerns.     - Case discussed with JAYE Jimenez-BC  Cardiology Electrophysiology  NP   [facility]    Attending: MD Rubén Rodriguez

## 2017-11-21 NOTE — TRANSFER OF CARE
"Anesthesia Transfer of Care Note    Patient: Suman Hayden    Procedure(s) Performed: Procedure(s) (LRB):  REVISION-LEAD-ICD (N/A)    Patient location: PACU    Anesthesia Type: general    Transport from OR: Transported from OR on room air with adequate spontaneous ventilation    Post pain: adequate analgesia    Post assessment: no apparent anesthetic complications    Post vital signs: stable    Level of consciousness: awake, alert and oriented    Nausea/Vomiting: no nausea/vomiting    Complications: none    Transfer of care protocol was followed      Last vitals:   Visit Vitals  /83 (BP Location: Left arm, Patient Position: Lying)   Pulse 78   Temp 37.2 °C (99 °F) (Oral)   Resp 15   Ht 5' 8" (1.727 m)   Wt 65.8 kg (145 lb)   SpO2 100%   BMI 22.05 kg/m²     "

## 2017-11-21 NOTE — PLAN OF CARE
Problem: Physical Therapy Goal  Goal: Physical Therapy Goal  Outcome: Outcome(s) achieved Date Met: 11/21/17  PT evaluation complete. No goals established as pt is baseline with mobility and has no acute PT needs at this time. D/C from PT services.    Kely Willett, PT, DPT   11/21/2017  [phone]

## 2017-11-21 NOTE — ASSESSMENT & PLAN NOTE
Daily E and M and VAD Interrogation Note    Reason for Visit:  Patient is seen in follow up for management of:  [] HeartMate 3        Interval History:   The [x] implant date was 7/27/17  Events overnight reviewed     Review of Systems:    All other systems reviewed and negative    Medications:  Current Facility-Administered Medications   Medication Dose Route Frequency Provider Last Rate Last Dose    0.9%  NaCl infusion   Intravenous Continuous RAMON Grace   Stopped at 11/20/17 2030    acetaminophen tablet 325 mg  325 mg Oral Q4H PRN Rubén Winchester MD   325 mg at 11/21/17 0848    alteplase injection 2 mg  2 mg Intra-Catheter Once MELISSA Wilson        amLODIPine tablet 10 mg  10 mg Oral Daily Kimmie Solis MD   10 mg at 11/21/17 0841    aspirin chewable tablet 81 mg  81 mg Oral Daily Kimmie Solis MD   81 mg at 11/21/17 0841    ceFAZolin (ANCEF) 1 gram in dextrose 5 % 50 mL IVPB (premix)  1 g Intravenous Q12H Rubén Winchester  mL/hr at 11/21/17 0443 1 g at 11/21/17 0443    dronabinol capsule 5 mg  5 mg Oral BID AC Kimmie Solis MD   5 mg at 11/21/17 0551    furosemide tablet 80 mg  80 mg Oral BID Kimmie Solis MD   80 mg at 11/21/17 0841    hydrALAZINE tablet 50 mg  50 mg Oral Q8H Kimmie Solis MD   50 mg at 11/21/17 0551    hydrocodone-acetaminophen 5-325mg per tablet 1 tablet  1 tablet Oral Q4H PRN Rubén Winchester MD        mupirocin 2 % ointment 1 g  1 g Nasal BID Kimmie Solis MD   1 g at 11/20/17 2146    ondansetron disintegrating tablet 4 mg  4 mg Oral Q8H PRN Kimmie Solis MD        pantoprazole EC tablet 40 mg  40 mg Oral Daily Kimmie Solis MD   40 mg at 11/21/17 0841    polyethylene glycol packet 17 g  17 g Oral Daily Kimmie Solis MD   17 g at 11/21/17 0840    potassium chloride SA CR tablet 40 mEq  40 mEq Oral BID Kimmie Solis MD   40 mEq at 11/21/17 0841    pravastatin tablet 20 mg  20  mg Oral QHS Kimmie Solis MD        [START ON 11/22/2017] warfarin (COUMADIN) tablet 2 mg  2 mg Oral Every Mon, Wed, Fri Kimmie Solis MD        [START ON 11/25/2017] warfarin (COUMADIN) tablet 2 mg  2 mg Oral Every Sat, Sun Kimmie Solis MD        [START ON 11/23/2017] warfarin (COUMADIN) tablet 2 mg  2 mg Oral Every Thurs Kimmie Solis MD        warfarin (COUMADIN) tablet 3 mg  3 mg Oral Every Tues Kimmie Solis MD           Physical Examination:  Vital Signs:   Vitals:    11/21/17 0828   BP: (!) 82/0   Pulse: 79   Resp: 16   Temp: 97.7 °F (36.5 °C)     Cardiovascular:  [x] Regular rate and rhythm [] Irregular []  None (MATT) []  Other  [x]  No edema []  Edema present  [x]  Clear to auscultation  VAD sounds smooth  Skin:  Incision is [x]  Clean, dry and intact.    Sternum:  [x]  Stable   Driveline(s):   [x]  Clean, dry and intact.         Procedure:  Device Interrogation including analysis of device parameters.  Current Settings     Ventricular Assist Device  []  Total Artificial Heart interrogated  Review of device function is [x]  Stable     TXP LVAD INTERROGATIONS 11/21/2017 11/21/2017 11/21/2017 11/20/2017 11/20/2017 11/20/2017 11/20/2017   Type HeartMate3 HeartMate3 HeartMate3 HeartMate3 - HeartMate3 HeartMate3   Flow 4.1 3.7 3.9 3.9 4.0 4.0 4.0   Speed 5200 5200 5200 5200 5200 5200 5250   PI 3.5 4.8 3.7 4.0 3.6 3.5 3.4   Power ([place]) 3.6 3.5 3.5 3.5 3.6 3.6 3.6   LSL - - - 4800 - - -   Pulsatility - Pulse Pulse Pulse - - -   Some recent data might be hidden       Assessment:  [x]  Primary Cardiomyopathy [x]  Congestive Heart Failure   []  Atrial Fibrillation []  Ventricular Tachycardia   []  Aftercare cardiac device [x]  Long term (current) use of anticoagulants   []  Ventilator-associated pneumonia []  Pneumonia viral, unspecified   []  Pneumonia, bacterial, unspecified []  Pneumonia, organism unspecified   []  Hemorrhage of GI tract, unspecified    []  Nosebleed        RHC  tomorrow off Dobutamine      Plan:  [x]  Interval history obtained from ICU attending team member during rounding today  [x]  VAD/MATT teaching performed with patient  [x]  Mobilization / Physical Therapy ongoing  [x]  Anticoagulation []  Ongoing [x]  Held        Total time spent was 36 minutes.  Of which more than 50 percent of the care dominated counseling and coordinating care with different team members. The VAD was interrogated and all parameters were WNL and no significant findings were found in the history. All these findings are documented in the note above.      Date of Service: 11/21/2017

## 2017-11-21 NOTE — PLAN OF CARE
Problem: Patient Care Overview  Goal: Plan of Care Review  Outcome: Ongoing (interventions implemented as appropriate)  Plan of care discussed with patient. Patient is free of fall/trauma/injury. Denies CP, SOB, or pain/discomfort. Ancef increased to q8h. VAD numbers and DP/BPs WNL. All questions addressed. Will continue to monitor.

## 2017-11-21 NOTE — PT/OT/SLP EVAL
"Physical Therapy  Evaluation/Discharge    Suman Hayden   MRN: [id number]   Admitting Diagnosis: Malfunction of implantable cardioverter-defibrillator (ICD) electrode    PT Received On: 11/21/17  PT Start Time: 1402     PT Stop Time: 1428    PT Total Time (min): 26 min       Billable Minutes:  Evaluation 26 (co-eval with OT)    Diagnosis: Malfunction of implantable cardioverter-defibrillator (ICD) electrode   ICD lead revision 11/20/17  LVAD in place, inserted 7/27/17    Past Medical History:   Diagnosis Date    Cardiomyopathy     Hyperlipidemia     Hypertension     Obesity     S/P implantation of automatic cardioverter/defibrillator (AICD)     Ventricular tachycardia       Past Surgical History:   Procedure Laterality Date    CARDIAC CATHETERIZATION      CARDIAC DEFIBRILLATOR PLACEMENT      LEFT VENTRICULAR ASSIST DEVICE  07/27/2017       Referring physician: Kimmie Solis MD  Date referred to PT: 11/20/17    General Precautions: Standard, LVAD, fall  Orthopedic Precautions: N/A   Braces: N/A       Do you have any cultural, spiritual, Hoahaoism conflicts, given your current situation?: none noted     Patient History:  Lives With: spouse, child(silvano), adult  Living Arrangements: apartment  Home Accessibility: stairs to enter home  Number of Stairs to Enter Home:  (1 flight)  Transportation Available: family or friend will provide  Living Environment Comment: Pt lives with his wife and adult children in a 2nd floor apt. Pt reports that PTA he was (I) with ambulation and ADLs, not using DME. Pt's wife able to assist upon d/c.   Equipment Currently Used at Home:  (owns rollator but not using PTA)    Previous Level of Function:  Ambulation Skills: independent  Transfer Skills: independent  ADL Skills: independent     Subjective:  Communicated with RN prior to session.  "I was cold and wet and I was t'ed off." "I'm sorry y'all. I'm sorry."  Chief Complaint: wet brief and wet hospital gown   Patient goals: " return home before Thanksgiving     Pain/Comfort  Pain Rating 1: 0/10      Objective:   Patient found with: telemetry, PICC line (LVAD to wall power)     Cognitive Exam:  Oriented to: Person and Place    Follows Commands/attention: Follows one-step commands  Communication: clear/fluent  Safety awareness/insight to disability: impaired    Physical Exam:  Postural examination/scapula alignment: Rounded shoulder and Head forward    Skin integrity: Visible skin intact  Edema: None noted     Sensation:   Intact    Lower Extremity Range of Motion:  Right Lower Extremity: WFL  Left Lower Extremity: WFL    Lower Extremity Strength:  Right Lower Extremity: grossly 3+/5 in all planes   Left Lower Extremity: grossly 3+/5 in all planes    Functional Mobility:  Bed Mobility:  Supine to Sit: Supervision    Transfers:  Sit <> Stand Assistance: Stand By Assistance  Sit <> Stand Assistive Device: No Assistive Device    Gait:   Gait Distance: ~40 ft. within room   Assistance 1: Stand by Assistance  Gait Assistive Device:  (pushing IV pole)  Gait Pattern: 2-point gait  Gait Deviation(s): decreased hal, decreased step length, decreased weight-shifting ability    Balance:   Static Sit: supervision   Dynamic Sit: supervision   Static Stand: SBA  Dynamic stand: SBA    Therapeutic Activities and Exercises:  Pt educated on role of PT and PT POC. Pt familiar with acute PT services and known to this PT from previous admission. Pt questioning why therapists were consulted, as pt states he was performing mobility without assist PTA. Explained that PT and OT are typically consulted when pt with LVAD is re-admitted. Pt with limited understanding despite explanation.   Pt found with soiled brief and wet hospital gown, which pt attributed to melted ice pack (however, appeared to be urine). Assisted pt with changing into clean brief and gown. Attempted to encourage pt to switch to battery power for ambulation outside of room, but pt declining 2*  concern about LVAD vest irritating ICD site. Performed ambulation within length of wall power. Shortly after therapy eval, pt seen ambulating in hallway with his wife.     AM-PAC 6 CLICK MOBILITY  How much help from another person does this patient currently need?   1 = Unable, Total/Dependent Assistance  2 = A lot, Maximum/Moderate Assistance  3 = A little, Minimum/Contact Guard/Supervision  4 = None, Modified [place]/Independent    Turning over in bed (including adjusting bedclothes, sheets and blankets)?: 3  Sitting down on and standing up from a chair with arms (e.g., wheelchair, bedside commode, etc.): 3  Moving from lying on back to sitting on the side of the bed?: 3  Moving to and from a bed to a chair (including a wheelchair)?: 3  Need to walk in hospital room?: 3  Climbing 3-5 steps with a railing?: 3  Total Score: 18     AM-PAC Raw Score CMS G-Code Modifier Level of Impairment Assistance   6 % Total / Unable   7 - 9 CM 80 - 100% Maximal Assist   10 - 14 CL 60 - 80% Moderate Assist   15 - 19 CK 40 - 60% Moderate Assist   20 - 22 CJ 20 - 40% Minimal Assist   23 CI 1-20% SBA / CGA   24 CH 0% Independent/ Mod I     Patient left up in chair with all lines intact, call button in reach and RN notified.    Assessment:   Suman Hayden is a 67 y.o. male with a medical diagnosis of Malfunction of implantable cardioverter-defibrillator (ICD) electrode and presents with LVAD in place, inserted 7/27/17. Pt was able to perform functional mobility without physical assist. Ambulation distance limited 2* pt refusing to switch to battery power, but performed within room without LOB or SOB noted. Pt states that he has no acute therapy concerns, as he has been performing mobility without difficulty PTA. D/c IP PT services 2* no acute PT services needed at this time. Recommend pt resume HH PT upon d/c in order to maximize safety and (I) in the home environment.     Rehab identified problem list/impairments:  Rehab identified problem list/impairments: impaired cardiopulmonary response to activity, decreased safety awareness    Rehab potential is good.    Activity tolerance: Good    Discharge recommendations: Discharge Facility/Level Of Care Needs: home with home health (resume HH)     Barriers to discharge: Barriers to Discharge: Inaccessible home environment    Equipment recommendations: Equipment Needed After Discharge: none     GOALS:    Physical Therapy Goals     Not on file          Multidisciplinary Problems (Resolved)        Problem: Physical Therapy Goal    Goal Priority Disciplines Outcome Goal Variances Interventions   Physical Therapy Goal   (Resolved)     PT/OT, PT Outcome(s) achieved                     PLAN:    Patient d/c IP PT services as he is baseline with mobility and has no acute PT needs at this time.  Plan of Care reviewed with: patient          Kely Amadorard, PT, DPT   11/21/2017  [phone]

## 2017-11-21 NOTE — NURSING
Noted pacer spikes on telemetry - not sensing prior QRS nor capturing ventricular response -  Notified Dr Palma and Dr Solis. Dr Palma states interrogated pacer and it is functioning properly,  Strips on chart

## 2017-11-21 NOTE — PROGRESS NOTES
[facility]  Heart Transplant  Progress Note    Patient Name: Suman Hayden  MRN: [id number]  Admission Date: 11/20/2017  Hospital Length of Stay: 1 days  Attending Physician: Rubén Winchester MD  Primary Care Provider: Joe Ernst MD  Principal Problem:Malfunction of implantable cardioverter-defibrillator (ICD) electrode    Subjective:     Interval History:  stopped overnight, no SOB. Has some pain from ICD site     Continuous Infusions:   sodium chloride 0.9% Stopped (11/20/17 2030)     Scheduled Meds:   alteplase  2 mg Intra-Catheter Once    amLODIPine  10 mg Oral Daily    aspirin  81 mg Oral Daily    ceFAZolin (ANCEF) IVPB  1 g Intravenous Q12H    dronabinol  5 mg Oral BID AC    furosemide  80 mg Oral BID    hydrALAZINE  50 mg Oral Q8H    mupirocin  1 g Nasal BID    pantoprazole  40 mg Oral Daily    polyethylene glycol  17 g Oral Daily    potassium chloride SA  40 mEq Oral BID    pravastatin  20 mg Oral QHS    [START ON 11/22/2017] warfarin  2 mg Oral Every Mon, Wed, Fri    [START ON 11/25/2017] warfarin  2 mg Oral Every Sat, Sun    [START ON 11/23/2017] warfarin  2 mg Oral Every Thurs    warfarin  3 mg Oral Every Tues     PRN Meds:acetaminophen, hydrocodone-acetaminophen 5-325mg, ondansetron    Review of patient's allergies indicates:  No Known Allergies  Objective:     Vital Signs (Most Recent):  Temp: 97.7 °F (36.5 °C) (11/21/17 0828)  Pulse: 79 (11/21/17 0828)  Resp: 16 (11/21/17 0828)  BP: (!) 82/0 (11/21/17 0828)  SpO2: 97 % (11/21/17 0828) Vital Signs (24h Range):  Temp:  [97.7 °F (36.5 °C)-99 °F (37.2 °C)] 97.7 °F (36.5 °C)  Pulse:  [78-82] 79  Resp:  [10-19] 16  SpO2:  [97 %-100 %] 97 %  BP: ()/(0-83) 82/0     Patient Vitals for the past 72 hrs (Last 3 readings):   Weight   11/21/17 0700 63.8 kg (140 lb 10.5 oz)   11/20/17 1017 65.8 kg (145 lb)     Body mass index is 21.39 kg/m².      Intake/Output Summary (Last 24 hours) at 11/21/17 0840  Last data  filed at 11/21/17 0600   Gross per 24 hour   Intake              910 ml   Output              500 ml   Net              410 ml       Hemodynamic Parameters:           Physical Exam   Constitutional: He appears well-developed and well-nourished. No distress.   HENT:   Head: Normocephalic.   Eyes: Pupils are equal, round, and reactive to light.   Neck: Normal range of motion. No JVD present.   Cardiovascular: Normal rate and regular rhythm.  Exam reveals no friction rub.    No murmur heard.  Pulmonary/Chest: Effort normal. No respiratory distress. He has no wheezes.   Abdominal: Soft. He exhibits no distension. There is no tenderness.   Musculoskeletal: Normal range of motion.   Neurological: He is alert.   Skin: Skin is warm. He is not diaphoretic.       Significant Labs:  CBC:    Recent Labs  Lab 11/16/17 1132 11/20/17 0918 11/21/17 0437   WBC 4.59 4.58 5.03   RBC 3.90* 3.75* 3.29*   HGB 9.9* 9.4* 8.3*   HCT 30.8* 29.4* 26.0*    249 220   MCV 79* 78* 79*   MCH 25.4* 25.1* 25.2*   MCHC 32.1 32.0 31.9*     BNP:    Recent Labs  Lab 11/20/17  0918   *     CMP:    Recent Labs  Lab 11/16/17 1132 11/20/17 0918 11/21/17 0437    77 64*   CALCIUM 9.6 9.5 9.0   ALBUMIN  --  3.2*  --    PROT  --  7.4  --     137 138   K 3.8 3.4* 3.8   CO2 25 28 26    99 103   BUN 11 11 11   CREATININE 0.9 1.1 1.0   ALKPHOS  --  68  --    ALT  --  8*  --    AST  --  17  --    BILITOT  --  0.8  --       Coagulation:     Recent Labs  Lab 11/16/17 1132 11/20/17 0918 11/21/17 0437   INR 2.0* 1.7* 1.5*   APTT 34.0*  --   --      LDH:    Recent Labs  Lab 11/20/17 0918 11/21/17 0437    159     Microbiology:  Microbiology Results (last 7 days)     ** No results found for the last 168 hours. **          I have reviewed all pertinent labs within the past 24 hours.    Estimated Creatinine Clearance: 64.7 mL/min (based on SCr of 1 mg/dL).    Diagnostic Results:  I have reviewed and interpreted all pertinent  imaging results/findings within the past 24 hours.    Assessment and Plan:     No notes on file    * Malfunction of implantable cardioverter-defibrillator (ICD) electrode    -s/p lead revision  -no heparin, will boost coumadin tonight         CHF (congestive heart failure)    -continue po lasix BID  -check VBG from PICC  -RHC tomorrow off         Anticoagulation monitoring, INR range 2-3    -boost coumadin  -no heparin for ICD lead revision         Heart replaced by heart assist device    -3, Dr. Downing implant  -continue po lasix  -off , will get echo [facility]            MELISSA Long  Heart Transplant  [facility]

## 2017-11-21 NOTE — SUBJECTIVE & OBJECTIVE
Interval History: cardiac telemetry with no acute arrhythmias . Left chest site CDI. Per HTS > RHC likely tomorrow.     Review of Systems   Constitution: Negative for chills, fever and weakness.   HENT: Negative for stridor.    Cardiovascular: Negative for chest pain, irregular heartbeat, leg swelling, palpitations and syncope.   Respiratory: Negative for cough, shortness of breath and wheezing.    Endocrine: Negative for polydipsia, polyphagia and polyuria.   Hematologic/Lymphatic: Negative for bleeding problem.   Skin: Negative for rash.   Musculoskeletal: Negative for muscle weakness and myalgias.   Gastrointestinal: Negative for abdominal pain, change in bowel habit, bowel incontinence, excessive appetite and heartburn.   Genitourinary: Negative for frequency and hematuria.   Neurological: Negative for dizziness, loss of balance and numbness.   Psychiatric/Behavioral: Negative for depression. The patient does not have insomnia.      Objective:     Vital Signs (Most Recent):  Temp: 97.7 °F (36.5 °C) (11/21/17 0828)  Pulse: 79 (11/21/17 0828)  Resp: 16 (11/21/17 0828)  BP: (!) 82/0 (11/21/17 0828)  SpO2: 97 % (11/21/17 0828) Vital Signs (24h Range):  Temp:  [97.7 °F (36.5 °C)-99 °F (37.2 °C)] 97.7 °F (36.5 °C)  Pulse:  [78-82] 79  Resp:  [10-19] 16  SpO2:  [97 %-100 %] 97 %  BP: ()/(0-83) 82/0     Weight: 63.8 kg (140 lb 10.5 oz)  Body mass index is 21.39 kg/m².     SpO2: 97 %  O2 Device (Oxygen Therapy): room air    Physical Exam   Constitutional: He is oriented to person, place, and time. He appears well-developed.   HENT:   Head: Normocephalic.   Neck: Normal range of motion.   Pulmonary/Chest: Effort normal and breath sounds normal.   Abdominal: Soft. Bowel sounds are normal.   Neurological: He is oriented to person, place, and time.   Skin: Skin is warm and dry.       Significant Labs: reviewed   Significant Imaging: reviewed

## 2017-11-21 NOTE — NURSING TRANSFER
Nursing Transfer Note      11/20/2017     Transfer To: [address]    Transfer via stretcher    Transfer with cardiac monitoring and portable LVAD monitor    Transported by RN     Medicines sent: none    Chart send with patient: Yes    Notified: spouse    Patient reassessed at: 11/20/17 see flowsheets

## 2017-11-21 NOTE — HPI
67 y.o. gentleman with PMH of NICM (EF 10%) on home  , esophagitis, HTN, HLD, s/p Medtronic CRT-D who underwent LVAD placement HeartMate 3 Implanted 7/27/2017 as DT. He then had very complcated post op course including delayed chest closure (7/28/17) and extubated (7/29/17), reintubated 8/9/17 and extubated 8/13/17. Pt suffered a  cardiac arrest, developed GI bleeding,  ileus. Pt also had past ESBL bacteremia > completed Abx. Pt  Doing well post that hospital discharge 9/22/17 > follows with VAD clinic. Pt with past ICD shocks for VT.  Pt with current lifevest in place,  ICD  Lead found to have high pacing threshold and poor sensing, tachy therapy off, with plans for outpatient lead revision.   Pt now presented for planned lead revision, currently on ASA and coumadin. Pt denies any acute symptoms such as fever, chills, chest pains, over shortness of breath.

## 2017-11-21 NOTE — PT/OT/SLP EVAL
"Occupational Therapy  Evaluation/Discharge    Suman Hayden   MRN: [id number]   Admitting Diagnosis: Malfunction of implantable cardioverter-defibrillator (ICD) electrode    OT Date of Treatment: 11/21/17   OT Start Time: 1400  OT Stop Time: 1425  OT Total Time (min): 25 min    Billable Minutes:  Evaluation 25 minutes    Diagnosis: Malfunction of implantable cardioverter-defibrillator (ICD) electrode   History of LVAD 7/27/17; s/p ICD lead revision    Past Medical History:   Diagnosis Date    Cardiomyopathy     Hyperlipidemia     Hypertension     Obesity     S/P implantation of automatic cardioverter/defibrillator (AICD)     Ventricular tachycardia       Past Surgical History:   Procedure Laterality Date    CARDIAC CATHETERIZATION      CARDIAC DEFIBRILLATOR PLACEMENT      LEFT VENTRICULAR ASSIST DEVICE  07/27/2017     Referring physician: Dr. Solis  Date referred to OT: 11/20/17    General Precautions: Standard, LVAD, fall  Orthopedic Precautions: N/A  Braces: N/A    Do you have any cultural, spiritual, Amish conflicts, given your current situation?: None     Patient History:  Living Environment  Lives With: spouse, child(silvano), adult  Living Environment Comment: Pt and wife living at daughter's house while their house is being renovated; currently living on 2nd floor with 1 flight of stairs. PTA, pt reports he was (I) with ADLs and mobility. Wife is able to assist as needed.  Equipment Currently Used at Home: none (owns rollator)    Prior level of function:   Bed Mobility/Transfers: independent  Grooming: independent  Bathing: independent  Upper Body Dressing: independent  Lower Body Dressing: independent  Toileting: independent  Home Management Skills: independent        Subjective:  Communicated with RN prior to session.  "I can do everything y'all can do. I don't want to be laying here in this wetness."    Chief Complaint: ICD site discomfort; wet sheets  Patient/Family stated goals: Return home " before Thanksgiving    Pain/Comfort  Pain Rating 1: 0/10  Pain Rating Post-Intervention 1: 0/10    Objective:  Patient found with: telemetry (LVAD to wall power)    Cognitive Exam:  Oriented to: Person, Place, Time and Situation  Follows Commands/attention: Follows multistep commands  Communication: clear/fluent  Memory:  No Deficits noted  Safety awareness/insight to disability: impaired  Coping skills/emotional control: Appropriate to situation    Visual/perceptual:  Intact    Physical Exam:  Postural examination/scapula alignment: No postural abnormalities identified  Skin integrity: Thin  Edema: None noted     Sensation:   Intact    Upper Extremity Range of Motion:  Right Upper Extremity: WFL  Left Upper Extremity: WFL    Upper Extremity Strength:  Right Upper Extremity: WFL  Left Upper Extremity: WFL   Strength: WFL    Fine motor coordination:   Intact    Functional Mobility:  Bed Mobility:  Supine to Sit: Supervision with HOB elevated    Transfers:  Sit <> Stand Assistance: Stand By Assistance from EOB  Sit <> Stand Assistive Device: No Assistive Device    Functional Ambulation: 2 laps within room to door and back with SBA no AD, managing IV pole    Activities of Daily Living:  UE Dressing Level of Assistance: Minimum assistance to change gown  LE Dressing Level of Assistance: Minimum assistance to change brief while standing    Balance:   Static Sit: NORMAL: No deviations seen in posture held statically  Dynamic Sit: NORMAL: No deviations seen in posture held dynamically  Static Stand: GOOD: Takes MODERATE challenges from all directions  Dynamic stand: GOOD: Needs SUPERVISION only during gait and able to self right with moderate     Therapeutic Activities and Exercises:  OT/PT eval; pt familiar with therapy services and this particular OT/PT from original LVAD implant admission; initially resistant to performing functional mobility and LVAD management; refused to switch to battery power due to  "anticipating discomfort at ICD site when wearing LVAD vest, but then proceeded to try to ambulate further than wall power cord would allow; assisted with UB/LB dressing and performed 2 laps functional mobility in room; later seen ambulating around unit with wife; reported no further need for acute OT; white board updated    AM-PAC 6 CLICK ADL  How much help from another person does this patient currently need?  1 = Unable, Total/Dependent Assistance  2 = A lot, Maximum/Moderate Assistance  3 = A little, Minimum/Contact Guard/Supervision  4 = None, Modified [place]/Independent    Putting on and taking off regular lower body clothing? : 3  Bathing (including washing, rinsing, drying)?: 3  Toileting, which includes using toilet, bedpan, or urinal? : 3  Putting on and taking off regular upper body clothing?: 3  Taking care of personal grooming such as brushing teeth?: 4  Eating meals?: 4  Total Score: 20    AM-PAC Raw Score CMS "G-Code Modifier Level of Impairment Assistance   6 % Total / Unable   7 - 9 CM 80 - 100% Maximal Assist   10-14 CL 60 - 80% Moderate Assist   15 - 19 CK 40 - 60% Moderate Assist   20 - 22 CJ 20 - 40% Minimal Assist   23 CI 1-20% SBA / CGA   24 CH 0% Independent/ Mod I       Patient left up in chair with all lines intact, call button in reach and RN notified    Assessment:  Suman Hayden is a 67 y.o. male with a medical diagnosis of Malfunction of implantable cardioverter-defibrillator (ICD) electrode, history of LVAD, s/o lead revision and presents with WFL strength and endurance needed for ADLs and functional mobility. Pt currently at baseline level of function and does not have acute OT needs; recommend resume HH therapy upon D/C.    Pt evaluation falls under low complexity for evaluation coding due to performance deficits noted in 1-3 areas as stated above and 0 co-morbities affecting current functional status. Data obtained from problem focused assessments. No modifications " or assistance was required for completion of evaluation. Only brief occupational profile and history review completed.     Rehab identified problem list/impairments: Rehab identified problem list/impairments: decreased safety awareness, impaired cardiopulmonary response to activity    Rehab potential is good.    Activity tolerance: Fair    Discharge recommendations: Discharge Facility/Level Of Care Needs: home with home health (resume HH)     Barriers to discharge: Barriers to Discharge: Inaccessible home environment    Equipment recommendations: none     GOALS:    Occupational Therapy Goals     Not on file          Multidisciplinary Problems (Resolved)        Problem: Occupational Therapy Goal    Goal Priority Disciplines Outcome Interventions   Occupational Therapy Goal   (Resolved)     OT, PT/OT Outcome(s) achieved                    PLAN:  (D/C OT)   Plan of Care reviewed with: patient    OT G-codes  [facility]  Score: 20  Functional Limitation: Self care  Self Care Current Status (): MOUNA  Self Care Goal Status (): DELMY Mcclain  11/21/2017

## 2017-11-22 VITALS
OXYGEN SATURATION: 95 % | RESPIRATION RATE: 18 BRPM | TEMPERATURE: 99 F | BODY MASS INDEX: 21.31 KG/M2 | SYSTOLIC BLOOD PRESSURE: 80 MMHG | HEIGHT: 68 IN | WEIGHT: 140.63 LBS | DIASTOLIC BLOOD PRESSURE: 56 MMHG | HEART RATE: 82 BPM

## 2017-11-22 LAB
ANION GAP SERPL CALC-SCNC: 10 MMOL/L
BASOPHILS # BLD AUTO: 0.04 K/UL
BASOPHILS NFR BLD: 0.6 %
BUN SERPL-MCNC: 14 MG/DL
CALCIUM SERPL-MCNC: 9 MG/DL
CHLORIDE SERPL-SCNC: 102 MMOL/L
CO2 SERPL-SCNC: 26 MMOL/L
CREAT SERPL-MCNC: 1.1 MG/DL
DIFFERENTIAL METHOD: ABNORMAL
EOSINOPHIL # BLD AUTO: 0.2 K/UL
EOSINOPHIL NFR BLD: 2.8 %
ERYTHROCYTE [DISTWIDTH] IN BLOOD BY AUTOMATED COUNT: 16 %
EST. GFR  (AFRICAN AMERICAN): >60 ML/MIN/1.73 M^2
EST. GFR  (NON AFRICAN AMERICAN): >60 ML/MIN/1.73 M^2
GLUCOSE SERPL-MCNC: 97 MG/DL
HCT VFR BLD AUTO: 26.4 %
HGB BLD-MCNC: 8.4 G/DL
IMM GRANULOCYTES # BLD AUTO: 0.02 K/UL
IMM GRANULOCYTES NFR BLD AUTO: 0.3 %
INR PPP: 2.1
LDH SERPL L TO P-CCNC: 157 U/L
LYMPHOCYTES # BLD AUTO: 1.1 K/UL
LYMPHOCYTES NFR BLD: 17.1 %
MAGNESIUM SERPL-MCNC: 1.7 MG/DL
MCH RBC QN AUTO: 24.9 PG
MCHC RBC AUTO-ENTMCNC: 31.8 G/DL
MCV RBC AUTO: 78 FL
MONOCYTES # BLD AUTO: 0.7 K/UL
MONOCYTES NFR BLD: 11.4 %
NEUTROPHILS # BLD AUTO: 4.3 K/UL
NEUTROPHILS NFR BLD: 67.8 %
NRBC BLD-RTO: 0 /100 WBC
PHOSPHATE SERPL-MCNC: 3 MG/DL
PLATELET # BLD AUTO: 237 K/UL
PMV BLD AUTO: 11.3 FL
POTASSIUM SERPL-SCNC: 3.8 MMOL/L
PROTHROMBIN TIME: 20.7 SEC
RBC # BLD AUTO: 3.37 M/UL
SODIUM SERPL-SCNC: 138 MMOL/L
WBC # BLD AUTO: 6.38 K/UL

## 2017-11-22 PROCEDURE — 80048 BASIC METABOLIC PNL TOTAL CA: CPT

## 2017-11-22 PROCEDURE — 93750 INTERROGATION VAD IN PERSON: CPT | Mod: ,,, | Performed by: INTERNAL MEDICINE

## 2017-11-22 PROCEDURE — 36415 COLL VENOUS BLD VENIPUNCTURE: CPT

## 2017-11-22 PROCEDURE — 85610 PROTHROMBIN TIME: CPT

## 2017-11-22 PROCEDURE — 99238 HOSP IP/OBS DSCHRG MGMT 30/<: CPT | Mod: ,,, | Performed by: INTERNAL MEDICINE

## 2017-11-22 PROCEDURE — 27000248 HC VAD-ADDITIONAL DAY

## 2017-11-22 PROCEDURE — 83735 ASSAY OF MAGNESIUM: CPT

## 2017-11-22 PROCEDURE — 85025 COMPLETE CBC W/AUTO DIFF WBC: CPT

## 2017-11-22 PROCEDURE — 25000003 PHARM REV CODE 250: Performed by: INTERNAL MEDICINE

## 2017-11-22 PROCEDURE — 83615 LACTATE (LD) (LDH) ENZYME: CPT

## 2017-11-22 PROCEDURE — 93750 INTERROGATION VAD IN PERSON: CPT | Performed by: INTERNAL MEDICINE

## 2017-11-22 PROCEDURE — 63600175 PHARM REV CODE 636 W HCPCS: Performed by: NURSE PRACTITIONER

## 2017-11-22 PROCEDURE — 84100 ASSAY OF PHOSPHORUS: CPT

## 2017-11-22 PROCEDURE — 63600175 PHARM REV CODE 636 W HCPCS: Performed by: INTERNAL MEDICINE

## 2017-11-22 RX ORDER — CEFADROXIL 500 MG/1
500 CAPSULE ORAL EVERY 12 HOURS
Qty: 10 CAPSULE | Refills: 0 | Status: SHIPPED | OUTPATIENT
Start: 2017-11-22 | End: 2017-11-27

## 2017-11-22 RX ORDER — MUPIROCIN 20 MG/G
1 OINTMENT TOPICAL 2 TIMES DAILY
Qty: 1 TUBE | Refills: 0 | Status: SHIPPED | OUTPATIENT
Start: 2017-11-22 | End: 2017-11-27

## 2017-11-22 RX ORDER — LANOLIN ALCOHOL/MO/W.PET/CERES
400 CREAM (GRAM) TOPICAL 2 TIMES DAILY
Qty: 60 TABLET | Refills: 11 | COMMUNITY
Start: 2017-11-22 | End: 2018-01-01 | Stop reason: SDUPTHER

## 2017-11-22 RX ORDER — CEPHALEXIN 500 MG/1
500 CAPSULE ORAL EVERY 8 HOURS
Qty: 15 CAPSULE | Refills: 0 | Status: CANCELLED | OUTPATIENT
Start: 2017-11-22 | End: 2017-11-27

## 2017-11-22 RX ORDER — WARFARIN 3 MG/1
3 TABLET ORAL
Status: DISCONTINUED | OUTPATIENT
Start: 2017-11-28 | End: 2017-11-22 | Stop reason: HOSPADM

## 2017-11-22 RX ORDER — WARFARIN 2 MG/1
TABLET ORAL
Qty: 30 TABLET | Refills: 11 | Status: ON HOLD | OUTPATIENT
Start: 2017-11-22 | End: 2017-12-01

## 2017-11-22 RX ORDER — FERROUS SULFATE 324(65)MG
325 TABLET, DELAYED RELEASE (ENTERIC COATED) ORAL DAILY
Qty: 30 TABLET | Refills: 11 | Status: ON HOLD | OUTPATIENT
Start: 2017-11-22 | End: 2017-12-01 | Stop reason: HOSPADM

## 2017-11-22 RX ADMIN — DRONABINOL 5 MG: 2.5 CAPSULE ORAL at 06:11

## 2017-11-22 RX ADMIN — MUPIROCIN 1 G: 20 OINTMENT TOPICAL at 09:11

## 2017-11-22 RX ADMIN — FUROSEMIDE 80 MG: 80 TABLET ORAL at 09:11

## 2017-11-22 RX ADMIN — CEFAZOLIN SODIUM 1 G: 1 SOLUTION INTRAVENOUS at 01:11

## 2017-11-22 RX ADMIN — PANTOPRAZOLE SODIUM 40 MG: 40 TABLET, DELAYED RELEASE ORAL at 09:11

## 2017-11-22 RX ADMIN — POTASSIUM CHLORIDE 40 MEQ: 1500 TABLET, EXTENDED RELEASE ORAL at 09:11

## 2017-11-22 RX ADMIN — HYDRALAZINE HYDROCHLORIDE 50 MG: 50 TABLET ORAL at 01:11

## 2017-11-22 RX ADMIN — ASPIRIN 81 MG CHEWABLE TABLET 81 MG: 81 TABLET CHEWABLE at 09:11

## 2017-11-22 RX ADMIN — AMLODIPINE BESYLATE 10 MG: 10 TABLET ORAL at 09:11

## 2017-11-22 RX ADMIN — HYDRALAZINE HYDROCHLORIDE 50 MG: 50 TABLET ORAL at 06:11

## 2017-11-22 RX ADMIN — CEFAZOLIN SODIUM 1 G: 1 SOLUTION INTRAVENOUS at 04:11

## 2017-11-22 NOTE — SUBJECTIVE & OBJECTIVE
Interval History: developed chest wall hematoma overnight, HH stable    Continuous Infusions:   sodium chloride 0.9% Stopped (11/20/17 2030)     Scheduled Meds:   amLODIPine  10 mg Oral Daily    aspirin  81 mg Oral Daily    ceFAZolin (ANCEF) IVPB  1 g Intravenous Q8H    dronabinol  5 mg Oral BID AC    furosemide  80 mg Oral BID    hydrALAZINE  50 mg Oral Q8H    mupirocin  1 g Nasal BID    pantoprazole  40 mg Oral Daily    polyethylene glycol  17 g Oral Daily    potassium chloride SA  40 mEq Oral BID    pravastatin  20 mg Oral QHS    warfarin  2 mg Oral Every Mon, Wed, Fri    [START ON 11/25/2017] warfarin  2 mg Oral Every Sat, Sun    [START ON 11/23/2017] warfarin  2 mg Oral Every Thurs     PRN Meds:acetaminophen, hydrocodone-acetaminophen 5-325mg, ondansetron    Review of patient's allergies indicates:  No Known Allergies  Objective:     Vital Signs (Most Recent):  Temp: 98.4 °F (36.9 °C) (11/22/17 0734)  Pulse: 80 (11/22/17 0734)  Resp: 16 (11/22/17 0734)  BP: (!) 80/0 (11/22/17 0734)  SpO2: (!) 93 % (11/22/17 0734) Vital Signs (24h Range):  Temp:  [97.6 °F (36.4 °C)-98.9 °F (37.2 °C)] 98.4 °F (36.9 °C)  Pulse:  [] 80  Resp:  [16-18] 16  SpO2:  [93 %-100 %] 93 %  BP: (74-96)/(0-70) 80/0     Patient Vitals for the past 72 hrs (Last 3 readings):   Weight   11/21/17 0700 63.8 kg (140 lb 10.5 oz)   11/20/17 1017 65.8 kg (145 lb)     Body mass index is 21.39 kg/m².    No intake or output data in the 24 hours ending 11/22/17 0838    Hemodynamic Parameters:           Physical Exam   Constitutional: He is oriented to person, place, and time. He appears well-developed and well-nourished.   HENT:   Head: Normocephalic and atraumatic.   Eyes: EOM are normal. Pupils are equal, round, and reactive to light.   Neck: Normal range of motion. Neck supple.   Pulmonary/Chest: Effort normal and breath sounds normal.   Abdominal: Soft. Bowel sounds are normal.   Musculoskeletal: Normal range of motion.    Neurological: He is alert and oriented to person, place, and time.       Significant Labs:  CBC:    Recent Labs  Lab 11/20/17 0918 11/21/17 0437 11/22/17 0426   WBC 4.58 5.03 6.38   RBC 3.75* 3.29* 3.37*   HGB 9.4* 8.3* 8.4*   HCT 29.4* 26.0* 26.4*    220 237   MCV 78* 79* 78*   MCH 25.1* 25.2* 24.9*   MCHC 32.0 31.9* 31.8*     BNP:    Recent Labs  Lab 11/20/17 0918   *     CMP:    Recent Labs  Lab 11/20/17 0918 11/21/17 0437 11/22/17 0426   GLU 77 64* 97   CALCIUM 9.5 9.0 9.0   ALBUMIN 3.2*  --   --    PROT 7.4  --   --     138 138   K 3.4* 3.8 3.8   CO2 28 26 26   CL 99 103 102   BUN 11 11 14   CREATININE 1.1 1.0 1.1   ALKPHOS 68  --   --    ALT 8*  --   --    AST 17  --   --    BILITOT 0.8  --   --       Coagulation:     Recent Labs  Lab 11/16/17  1132 11/20/17 0918 11/21/17 0437 11/22/17 0426   INR 2.0* 1.7* 1.5* 2.1*   APTT 34.0*  --   --   --      LDH:    Recent Labs  Lab 11/20/17 0918 11/21/17 0437 11/22/17 0426    159 157     Microbiology:  Microbiology Results (last 7 days)     ** No results found for the last 168 hours. **          I have reviewed all pertinent labs within the past 24 hours.    Estimated Creatinine Clearance: 58.8 mL/min (based on SCr of 1.1 mg/dL).    Diagnostic Results:  I have reviewed and interpreted all pertinent imaging results/findings within the past 24 hours.

## 2017-11-22 NOTE — SUBJECTIVE & OBJECTIVE
Interval History:   Hematoma development this Am, left chest ICD site, s/p lead revision 11/20/2017.  Pressure dressing placed.    Review of Systems   All other systems reviewed and are negative.    Objective:     Vital Signs (Most Recent):  Temp: 98.5 °F (36.9 °C) (11/22/17 1125)  Pulse: 82 (11/22/17 1125)  Resp: 18 (11/22/17 1125)  BP: (!) 80/56 (11/22/17 1125)  SpO2: 95 % (11/22/17 1125) Vital Signs (24h Range):  Temp:  [98.1 °F (36.7 °C)-98.9 °F (37.2 °C)] 98.5 °F (36.9 °C)  Pulse:  [] 82  Resp:  [16-18] 18  SpO2:  [93 %-100 %] 95 %  BP: (74-96)/(0-70) 80/56     Weight: 63.8 kg (140 lb 10.5 oz)  Body mass index is 21.39 kg/m².     SpO2: 95 %  O2 Device (Oxygen Therapy): room air    Physical Exam   Constitutional: He appears well-developed and well-nourished. No distress.   Neck: No JVD present.   Cardiovascular: Normal rate and regular rhythm.    No murmur heard.  LVAD hum   Pulmonary/Chest: Effort normal and breath sounds normal.   Left ICD site hematoma noted, currently no pain, aquacel dressing in place. No external bleeding noted from the pocket.       Significant Labs: All pertinent lab results from the last 24 hours have been reviewed.

## 2017-11-22 NOTE — ASSESSMENT & PLAN NOTE
S/p ICD lead revision.    -Pressure dressing placed  -Aquacel dressing to remain in place until wound check appointment in 1 week   -Device clinic follow up in 1 week for wound check, and 3 months clinic follow up with Dr. Rubén Winchester

## 2017-11-22 NOTE — PROGRESS NOTES
DISCHARGE    SW to pt's room for discharge today.  Pt presents as sitting up in bedside chair with wife Kia at bedside.  Pt and wife both present as aao x4, calm, cooperative, and asking and answering questions appropriately.  Pt and wife verbalize understanding and agreement with plan for d/c to home today with HH.  Dobutamine was weaned this admission.  Pt requesting to continue HH services with [facility]  for INR checks and lab draws.  SW notified Dorie with [facility]  ([id number]) of d/c today; [facility] will notify [facility] .  Pt reports his wife will drive him home today.  Pt and wife both report coping adequately at this time, and deny any needs or concerns to SW.  SW providing ongoing psychosocial and counseling support, education, resources, assistance, and discharge planning as indicated.  SW remains available.

## 2017-11-22 NOTE — DISCHARGE SUMMARY
[facility]  Heart Transplant  Discharge Summary      Patient Name: Suman Hayden  MRN: [id number]  Admission Date: 11/20/2017  Hospital Length of Stay: 2 days  Discharge Date and Time: 11/22/2017 12:15 PM  Attending Physician: Rubén Winchester MD   Discharging Provider: MELISSA Long  Primary Care Provider: Joe Ernst MD     HPI: 67 y.o. gentleman with PMH of NICM (EF 10%) on home  , esophagitis, HTN, HLD, s/p Medtronic CRT-D who underwent LVAD placement HeartMate 3 Implanted 7/27/2017 as DT. He then had very complcated post op course including delayed chest closure (7/28/17) and extubated (7/29/17), reintubated 8/9/17 and extubated 8/13/17. Pt suffered a  cardiac arrest, developed GI bleeding,  ileus. Pt also had past ESBL bacteremia > completed Abx. Pt  Doing well post that hospital discharge 9/22/17 > follows with VAD clinic. Pt with past ICD shocks for VT.  Pt with current lifevest in place,  ICD  Lead found to have high pacing threshold and poor sensing, tachy therapy off, with plans for outpatient lead revision.   Pt now presented for planned lead revision, currently on ASA and coumadin. Pt denies any acute symptoms such as fever, chills, chest pains, over shortness of breath.     Procedure(s) (LRB):  REVISION-LEAD-ICD (N/A)     Hospital Course: Had AICD lead revision by Dr. Ash. Admitted to [facility] and  stopped. SVO2 from PICC after  off was 54 (of note, on  it was 53). PICC line was pulled and coumadin boosted. Overnight he developed a chest wall hematoma and EP applied pressure dressing to stay on for 48 hours. He will dc home to return to wound clinic on Monday.         Significant Diagnostic Studies: Labs:   BMP:   Recent Labs  Lab 11/21/17  0437 11/22/17  0426   GLU 64* 97    138   K 3.8 3.8    102   CO2 26 26   BUN 11 14   CREATININE 1.0 1.1   CALCIUM 9.0 9.0   MG 1.9 1.7       Pending Diagnostic Studies:     Procedure Component Value Units  Date/Time    Protime-INR [[id number]] Collected:  11/20/17 0940    Order Status:  Sent Lab Status:  In process Updated:  11/20/17 0940    Specimen:  Blood from Blood         Final Active Diagnoses:    Diagnosis Date Noted POA    PRINCIPAL PROBLEM:  Malfunction of implantable cardioverter-defibrillator (ICD) electrode [T82.110A] 07/18/2017 Yes    CHF (congestive heart failure) [I50.9] 11/20/2017 Yes    Anticoagulation monitoring, INR range 2-3 [Z79.01] 09/22/2017 Not Applicable    Heart replaced by heart assist device [Z95.811]  Not Applicable    LVAD (left ventricular assist device) present [Z95.811] 07/29/2017 Not Applicable      Problems Resolved During this Admission:    Diagnosis Date Noted Date Resolved POA      Discharged Condition: good    Disposition:   good  Follow Up:  one week    Patient Instructions:   No discharge procedures on file.  Medications:  Reconciled Home Medications:   Current Discharge Medication List      CONTINUE these medications which have NOT CHANGED    Details   amLODIPine (NORVASC) 10 MG tablet Take 1 tablet (10 mg total) by mouth once daily.  Qty: 30 tablet, Refills: 11    Associated Diagnoses: Essential hypertension      aspirin 81 MG Chew Take 81 mg by mouth once daily.      DOBUTamine (DOBUTREX) 500 mg/250 mL (2,000 mcg/mL) Inject 199.75 mcg/min into the vein continuous.      dronabinol (MARINOL) 5 MG capsule Take 1 capsule (5 mg total) by mouth 2 (two) times daily before meals.  Qty: 60 capsule, Refills: 5    Associated Diagnoses: Heart replaced by heart assist device      furosemide (LASIX) 80 MG tablet Take 1 tablet (80 mg total) by mouth 2 (two) times daily.  Qty: 60 tablet, Refills: 11      hydrALAZINE (APRESOLINE) 50 MG tablet Take 1 tablet (50 mg total) by mouth every 8 (eight) hours.  Qty: 90 tablet, Refills: 11    Associated Diagnoses: Essential hypertension      ondansetron (ZOFRAN-ODT) 4 MG TbDL Take 1 tablet (4 mg total) by mouth every 8 (eight) hours as  needed.  Qty: 30 tablet, Refills: 1    Associated Diagnoses: Nausea      pantoprazole (PROTONIX) 40 MG tablet Take 1 tablet (40 mg total) by mouth once daily.  Qty: 30 tablet, Refills: 11    Associated Diagnoses: Gastroesophageal reflux disease without esophagitis      polyethylene glycol (GLYCOLAX) 17 gram/dose powder As needed      potassium chloride SA (K-DUR,KLOR-CON) 20 MEQ tablet Take 2 tablets (40 mEq total) by mouth 2 (two) times daily.  Qty: 60 tablet, Refills: 11      pravastatin (PRAVACHOL) 20 MG tablet Take 1 tablet (20 mg total) by mouth every evening.  Qty: 30 tablet, Refills: 11      warfarin (COUMADIN) 2 MG tablet Take 1 tablet (2 mg total) by mouth Daily.  Qty: 30 tablet, Refills: 11      ferrous sulfate 324 mg (65 mg iron) TbEC Take 1 tablet (324 mg total) by mouth once daily.  Qty: 30 tablet, Refills: 11      hydrocodone-acetaminophen 5-325mg (NORCO) 5-325 mg per tablet Take 1 tablet by mouth every 6 (six) hours as needed (Acute pain, Z95.811 left ventricular assist device placement).  Qty: 28 tablet, Refills: 0      spironolactone (ALDACTONE) 25 MG tablet Take 1 tablet (25 mg total) by mouth once daily.  Qty: 30 tablet, Refills: 11             MELISSA Long  Heart Transplant  [facility]

## 2017-11-22 NOTE — PLAN OF CARE
[facility]   Heart Transplant/VAD Clinic   [address] [place] [address]   [phone] after hours          [phone] fax     VAD HOME  HEALTH ORDERS      Admit to Home Health    Diagnosis:   Patient Active Problem List   Diagnosis    Nonischemic cardiomyopathy    Encounter for monitoring amiodarone therapy    Essential hypertension    Hyperlipidemia    V-tach    Elevated PSA    Hepatitis B core antibody positive since 2012    Chronic systolic congestive heart failure    Heart transplant candidate    Hyperbilirubinemia    Malfunction of implantable cardioverter-defibrillator (ICD) electrode    Syncope and collapse    Atrial tachycardia    Hyperglycemia    AICD (automatic cardioverter/defibrillator) present    LVAD (left ventricular assist device) present    Atrial fibrillation    Heart replaced by heart assist device    Anticoagulation monitoring, INR range 2-3    CHF (congestive heart failure)       Patient is homebound due to:  CHF    Diet: Low Fat, Low cholesterol, 2Gm Na, Coumadin restrictions.    Acitivities: No Swimming, bathing, vacuuming, contact sports.    Fresh implants= Sternal Precautions    Nursing:   SN to complete comprehensive assessment including routine vital signs. Instruct on disease process and s/s of complications to report to MD. Review/verify medication list sent home with the patient at time of discharge  and instruct patient/caregiver as needed. Frequency may be adjusted depending on start of care date.    **LVAD driveline exit site dressing change is to be completed per LVAD patient/caregiver only**.    Notify MD if SBP > 160 or < 90; DBP > 90 or < 50; HR > 120 or < 50; Temp > 101; Weight gain >3lbs in 1 day or 5lbs in 1 week.  Other:         LABS:  SN to perform labs: PT/INR per Coumadin clinic [phone].   Follow up INR date: 11/24/2017    No Finger Sticks              Send initial Home Health orders to  HTS attending physician on call.  Send follow up questions to [facility] clinic MD [phone] or [phone].

## 2017-11-22 NOTE — HOSPITAL COURSE
Had AICD lead revision by Dr. Ash. Admitted to [facility] and  stopped. SVO2 from PICC after  off was 54 (of note, on  it was 53). PICC line was pulled and coumadin boosted. Overnight he developed a chest wall hematoma and EP applied pressure dressing to stay on for 48 hours. He will dc home to return to wound clinic on Monday.

## 2017-11-22 NOTE — PROGRESS NOTES
Attempted to call EP fellow and 1st on call cardiology fellow x2 to alert them about the hematoma.  Nurse Marshall reports having notified Dr. Krishna.  Will attempt again and will notify oncoming PA regarding this.

## 2017-11-22 NOTE — PROGRESS NOTES
Mr. Suman Hayden is being discharged today following an AICD lead revision. Past medical history significant for NICM (EF 10%) on home  , esophagitis, HTN, HLD, s/p Medtronic CRT-D who underwent LVAD placement HeartMate 3 Implanted 7/27/2017.    While in hospital dobutamine was stopped. Patient was discharged on Cefadroxil 500mg po q12h for 5 days following lead revision. Patient did develop a chest wall hematoma following revision. Patient is not taking ferrous gluconate 324mg po daily at this time as he states it makes him vomit but is willing to try to reintroduce later (recommended he can follow-up at his next clinic visit).     The INR on admission was 1.7 patient received 2mg of coumadin at 00:03h 11/21 and then 5mg of coumadin at 17:00h. INR increased from 1.5 11/21 to 2.1 11/22. Patient instructed to take 1mg of coumadin 11/22 anf then resume 3mg on Tuesdays and 2mg the other days of the week. Recommend to follow-up INR on Friday with Coumadin Clinic.

## 2017-11-22 NOTE — PROGRESS NOTES
[facility]  Cardiac Electrophysiology  Progress Note    Admission Date: 11/20/2017  Code Status: Full Code   Attending Physician: Rubén Winchester MD   Expected Discharge Date: 11/22/2017  Principal Problem:Malfunction of implantable cardioverter-defibrillator (ICD) electrode    Subjective:     Interval History:   Hematoma development this Am, left chest ICD site, s/p lead revision 11/20/2017.  Pressure dressing placed.    Review of Systems   All other systems reviewed and are negative.    Objective:     Vital Signs (Most Recent):  Temp: 98.5 °F (36.9 °C) (11/22/17 1125)  Pulse: 82 (11/22/17 1125)  Resp: 18 (11/22/17 1125)  BP: (!) 80/56 (11/22/17 1125)  SpO2: 95 % (11/22/17 1125) Vital Signs (24h Range):  Temp:  [98.1 °F (36.7 °C)-98.9 °F (37.2 °C)] 98.5 °F (36.9 °C)  Pulse:  [] 82  Resp:  [16-18] 18  SpO2:  [93 %-100 %] 95 %  BP: (74-96)/(0-70) 80/56     Weight: 63.8 kg (140 lb 10.5 oz)  Body mass index is 21.39 kg/m².     SpO2: 95 %  O2 Device (Oxygen Therapy): room air    Physical Exam   Constitutional: He appears well-developed and well-nourished. No distress.   Neck: No JVD present.   Cardiovascular: Normal rate and regular rhythm.    No murmur heard.  LVAD hum   Pulmonary/Chest: Effort normal and breath sounds normal.   Left ICD site hematoma noted, currently no pain, aquacel dressing in place. No external bleeding noted from the pocket.       Significant Labs: All pertinent lab results from the last 24 hours have been reviewed.        Assessment and Plan:     * Malfunction of implantable cardioverter-defibrillator (ICD) electrode    S/p ICD lead revision.    -Pressure dressing placed  -Aquacel dressing to remain in place until wound check appointment in 1 week   -Device clinic follow up in 1 week for wound check, and 3 months clinic follow up with Dr. Rubén Barajas MD  Cardiac Electrophysiology  [facility]

## 2017-11-22 NOTE — PHYSICIAN QUERY
PT Name: Suman Hayden  MR #: [id number]     Physician Query Form - Documentation Clarification      CDS/: Flora Michelle               Contact information:[email or web address]    This form is a permanent document in the medical record.     Query Date: November 22, 2017    By submitting this query, we are merely seeking further clarification of documentation. Please utilize your independent clinical judgment when addressing the question(s) below.    The Medical record reflects the following:    Supporting Clinical Findings Location in Medical Record     NICM (EF 10%) on home        Discharge Summary                                                                             Doctor, Please specify diagnosis or diagnoses associated with above clinical findings.    Provider Use Only        Please specify the type of Cardiomyopathy:    [  x   ] Dilated Cardiomyopathy  [     ] Congestive Cardiomyopathy  [     ] Other:_________________                                                                                                               [  ] Clinically undetermined

## 2017-11-22 NOTE — PROGRESS NOTES
Attempted to draw morning labs on Pt. Both lumens flush without difficulty. The red lumen returned approximately 5cc of blood before it stopped drawing back. Trouble shooting was attempted without success. Pt has PICC line for home ,  has been stopped this hospital stay. MD Irma notified. Since both lines flush fine, will monitor.

## 2017-11-22 NOTE — PROCEDURES
Patient awake with family at bedside. VAD interrogation completed this AM in the event changes needed to be made. Will continue to monitor for further issues.     Pulsatile: Yes, intermittent   VAD Sounds: HM3  Smooth  Problems / Issues / Alarms with VAD if any: None noted  HCT: 26.4      VAD Interrogation:  TXP LVAD INTERROGATIONS 11/22/2017 11/22/2017 11/22/2017 11/21/2017 11/21/2017 11/21/2017 11/21/2017   Type HeartMate3 HeartMate3 HeartMate3 HeartMate3 HeartMate3 HeartMate3 HeartMate3   Flow 4.2 4.3 4.2 4.3 3.9 4.1 4.1   Speed 5200 5200 5200 5200 5200 5200 5200   PI 3.5 3.5 3.3 3.2 4.5 3.8 3.5   Power ([place]) 3.6 3.7 3.6 3.6 3.6 3.6 3.6   [facility] - 4800 4800 4800 - - -   Pulsatility - Intermittent pulse Intermittent pulse - - - -   Some recent data might be hidden   }

## 2017-11-22 NOTE — PLAN OF CARE
Problem: Patient Care Overview  Goal: Plan of Care Review  Outcome: Ongoing (interventions implemented as appropriate)  IV antibiotics administered per MD order overnight. Pt educated on fall risks overnight, Pt remained free from falls/trauma/injury. VSS. LVAD hum smooth. LVAD numbers WNL, no LVAD alarms noted in history. Denies any CP, SOB, palpitations, and dizziness. PRN pain medication administered for moderate pain to L chest wall.  Turning/repositioning independently in bed. Plan of care reviewed with patient and all questions answered, verbalizes understanding. No acute distress noted. Will continue to monitor.

## 2017-11-22 NOTE — PROGRESS NOTES
[facility]  Heart Transplant  Progress Note    Patient Name: Suman Hayden  MRN: [id number]  Admission Date: 11/20/2017  Hospital Length of Stay: 2 days  Attending Physician: Rubén Winchester MD  Primary Care Provider: Joe Ernst MD  Principal Problem:Malfunction of implantable cardioverter-defibrillator (ICD) electrode    Subjective:     Interval History: developed chest wall hematoma overnight, HH stable    Continuous Infusions:   sodium chloride 0.9% Stopped (11/20/17 2030)     Scheduled Meds:   amLODIPine  10 mg Oral Daily    aspirin  81 mg Oral Daily    ceFAZolin (ANCEF) IVPB  1 g Intravenous Q8H    dronabinol  5 mg Oral BID AC    furosemide  80 mg Oral BID    hydrALAZINE  50 mg Oral Q8H    mupirocin  1 g Nasal BID    pantoprazole  40 mg Oral Daily    polyethylene glycol  17 g Oral Daily    potassium chloride SA  40 mEq Oral BID    pravastatin  20 mg Oral QHS    warfarin  2 mg Oral Every Mon, Wed, Fri    [START ON 11/25/2017] warfarin  2 mg Oral Every Sat, Sun    [START ON 11/23/2017] warfarin  2 mg Oral Every Thurs     PRN Meds:acetaminophen, hydrocodone-acetaminophen 5-325mg, ondansetron    Review of patient's allergies indicates:  No Known Allergies  Objective:     Vital Signs (Most Recent):  Temp: 98.4 °F (36.9 °C) (11/22/17 0734)  Pulse: 80 (11/22/17 0734)  Resp: 16 (11/22/17 0734)  BP: (!) 80/0 (11/22/17 0734)  SpO2: (!) 93 % (11/22/17 0734) Vital Signs (24h Range):  Temp:  [97.6 °F (36.4 °C)-98.9 °F (37.2 °C)] 98.4 °F (36.9 °C)  Pulse:  [] 80  Resp:  [16-18] 16  SpO2:  [93 %-100 %] 93 %  BP: (74-96)/(0-70) 80/0     Patient Vitals for the past 72 hrs (Last 3 readings):   Weight   11/21/17 0700 63.8 kg (140 lb 10.5 oz)   11/20/17 1017 65.8 kg (145 lb)     Body mass index is 21.39 kg/m².    No intake or output data in the 24 hours ending 11/22/17 0838    Hemodynamic Parameters:           Physical Exam   Constitutional: He is oriented to person, place, and  time. He appears well-developed and well-nourished.   HENT:   Head: Normocephalic and atraumatic.   Eyes: EOM are normal. Pupils are equal, round, and reactive to light.   Neck: Normal range of motion. Neck supple.   Pulmonary/Chest: Effort normal and breath sounds normal.   Abdominal: Soft. Bowel sounds are normal.   Musculoskeletal: Normal range of motion.   Neurological: He is alert and oriented to person, place, and time.       Significant Labs:  CBC:    Recent Labs  Lab 11/20/17 0918 11/21/17 0437 11/22/17 0426   WBC 4.58 5.03 6.38   RBC 3.75* 3.29* 3.37*   HGB 9.4* 8.3* 8.4*   HCT 29.4* 26.0* 26.4*    220 237   MCV 78* 79* 78*   MCH 25.1* 25.2* 24.9*   MCHC 32.0 31.9* 31.8*     BNP:    Recent Labs  Lab 11/20/17 0918   *     CMP:    Recent Labs  Lab 11/20/17 0918 11/21/17 0437 11/22/17 0426   GLU 77 64* 97   CALCIUM 9.5 9.0 9.0   ALBUMIN 3.2*  --   --    PROT 7.4  --   --     138 138   K 3.4* 3.8 3.8   CO2 28 26 26   CL 99 103 102   BUN 11 11 14   CREATININE 1.1 1.0 1.1   ALKPHOS 68  --   --    ALT 8*  --   --    AST 17  --   --    BILITOT 0.8  --   --       Coagulation:     Recent Labs  Lab 11/16/17  1132 11/20/17 0918 11/21/17 0437 11/22/17 0426   INR 2.0* 1.7* 1.5* 2.1*   APTT 34.0*  --   --   --      LDH:    Recent Labs  Lab 11/20/17 0918 11/21/17 0437 11/22/17 0426    159 157     Microbiology:  Microbiology Results (last 7 days)     ** No results found for the last 168 hours. **          I have reviewed all pertinent labs within the past 24 hours.    Estimated Creatinine Clearance: 58.8 mL/min (based on SCr of 1.1 mg/dL).    Diagnostic Results:  I have reviewed and interpreted all pertinent imaging results/findings within the past 24 hours.    Assessment and Plan:     No notes on file    * Malfunction of implantable cardioverter-defibrillator (ICD) electrode    -s/p lead revision  -will discuss with EP if patient can dc with chest wall hematoma         CHF  (congestive heart failure)    -continue po lasix BID  -VBG off  54          Anticoagulation monitoring, INR range 2-3    -continue coumadin   -no heparin for ICD lead revision         Heart replaced by heart assist device    -3, Dr. Downing implant  -continue po lasix  -off , echo done             MELISSA Long  Heart Transplant  [facility]

## 2017-11-22 NOTE — PROGRESS NOTES
Large hematoma noted to L chest wall pacemaker revision site this morning. MD Tomi with EP and MD Irma with HTS notified. No new orders received, will continue to monitor.

## 2017-11-22 NOTE — PHYSICIAN QUERY
"PT Name: Suman Hayden  MR #: [id number]    Physician Query Form - Heart  Condition Clarification     CDS/: Flora Michelle               Contact information: [email or web address]    This form is a permanent document in the medical record.     Query Date: November 22, 2017    By submitting this query, we are merely seeking further clarification of documentation. Please utilize your independent clinical judgment when addressing the question(s) below.    The medical record contains the following   Indicators     Supporting Clinical Findings Location in Medical Record    BNP      EF EF 10-15% 2D Echo 11/21    Radiology findings      Echo Results   1 - Heartmate III LVAD; speed 5200.     2 - Mild left ventricular enlargement.     3 - Severely depressed left ventricular systolic function (EF 10-15%).     4 - Eccentric hypertrophy.     5 - Impaired LV relaxation, normal LAP (grade 1 diastolic dysfunction).     6 - Biatrial enlargement.     7 - Right ventricular enlargement with mildly to moderately depressed systolic function.     8 - Trivial to mild mitral regurgitation.     9 - Trivial tricuspid regurgitation.     10 - Increased central venous pressure.     11 - Pulmonary hypertension. The estimated PA systolic pressure is 41 mmHg.  2D Echo 11/21    "Ascites" documented      "SOB" or "PEREIRA" documented      "Hypoxia" documented      Heart Failure documented CHF (congestive heart failure Discharge summary     "Edema" documented      Diuretics/Meds      Treatment:      Other:  LVAD (left ventricular assist device) present Discharge Summary        Provider, please specify diagnosis or diagnoses associated with above clinical findings.                               [  ] Acute Systolic Heart Failure ( EF < 40)*  [  ] Acute on Chronic Systolic Heart Failure ( EF < 40)*  [  ] Chronic Systolic Heart Failure (EF < 40)*  [  ] Acute Diastolic Heart Failure ( EF > 40)*  [  ] Acute on Chronic Diastolic Heart Failure( EF > " 40)*  [  ] Chronic Diastolic Heart Failure (EF > 40)*  [  ] Acute Combined Systolic and Diastolic Heart Failure  [ x ] Acute on Chronic Combined Systolic and Diastolic Heart Failure  [  ] Chronic Combined Systolic and Diastolic Heart Failure  [  ] Other Type of Heart Failure (please specify type): _________________________  [  ] Heart Failure Ruled Out  [  ] Other (please specify): ___________________________________  [  ] Clinically Undetermined            *American [employer] Association                                                                                                          Please document in your progress notes daily for the duration of treatment until resolved and include in your discharge summary.

## 2017-11-22 NOTE — PLAN OF CARE
[facility]   Heart Transplant Clinic  [address] [place] [address]   [phone] after hours        HOME  HEALTH ORDERS      Admit to Home Health    Diagnosis:   Patient Active Problem List   Diagnosis    Nonischemic cardiomyopathy    Encounter for monitoring amiodarone therapy    Essential hypertension    Hyperlipidemia    V-tach    Elevated PSA    Hepatitis B core antibody positive since 2012    Chronic systolic congestive heart failure    Heart transplant candidate    Hyperbilirubinemia    Malfunction of implantable cardioverter-defibrillator (ICD) electrode    Syncope and collapse    Atrial tachycardia    Hyperglycemia    AICD (automatic cardioverter/defibrillator) present    LVAD (left ventricular assist device) present    Atrial fibrillation    Heart replaced by heart assist device    Anticoagulation monitoring, INR range 2-3    CHF (congestive heart failure)       Patient is homebound due to:      Diet:     Acitivities:     Nursing:   SN to complete comprehensive assessment including routine vital signs. Instruct on disease process and s/s of complications to report to MD. Review/verify medication list sent home with the patient at time of discharge  and instruct patient/caregiver as needed. Frequency may be adjusted depending on start of care date.    Notify MD if SBP > 160 or < 90; DBP > 90 or < 50; HR > 120 or < 50; Temp > 101; Weight gain >3lbs in 1 day or 5lbs in 1 week.   Other:       LABS:  SN to perform labs:       HOME INFUSION THERAPY:    SN to perform Infusion Therapy/Central Line Care.  Review Central Line Care & Central Line Flush with patient.    Administer (drug and dose):       **For questions or concerns, please call [phone] and ask for Pre-Heart transplant clinic, M-F 8-5. After hours, weekends, call [phone] and ask for the Heart Transplant Cardiologist on call.**    Central line care:  Scrub the Hub:  Prior to accessing the line, always perform a 30 second alcohol scrub  Each lumen of the central line is to be flushed at least daily with 10 mL Normal Saline and 3 mL Heparin flush (100 units/mL)  Skilled Nurse (SN) may draw blood from IV access  Blood Draw Procedure:   - Aspirate at least 5 mL of blood   - Discard   - Obtain specimen   - Change posiflow cap   - Flush with 20 mL Normal Saline followed by a                 3-5 mL Heparin flush (100 units/mL)  Central :   - Sterile dressing changes are done weekly and as needed.   - Use chlor-hexadine scrub to cleanse site, apply Biopatch to insertion site,       apply securement device dressing   - Posi-flow caps are changed weekly and after EVERY lab draw.   - If sterile gauze is under dressing to control oozing,                 dressing change must be performed every 24 hours until gauze is not needed.      CONSULTS:     Physical Therapy to evaluate and treat. Evaluate for home safety and equipment needs; Establish/upgrade home exercise program. Perform / instruct on therapeutic exercises, gait training, transfer training, and Range of Motion.    Occupational Therapy to evaluate and treat. Evaluate home environment for safety and equipment needs. Perform/Instruct on transfers, ADL training, ROM, and therapeutic exercises.    Speech Therapy  to evaluate and treat for:  Language  Swallowing  Cognition                                              to evaluate for community resources/long-range planning.     Aide to provide assistance with personal care, ADLs, and vital signs      Send initial Home Health orders to HTS attending physician on call.  Send follow up questions to [phone] or fax:                    Pre Transplant:   [phone]        Post Transplant: [phone]        Heart Failure:      [phone]

## 2017-11-22 NOTE — NURSING
Pt d/c home per MD orders. Telemetry removed, telemetry room notified. IV access removed and intact x 1. VSS. No distress noted. No complaints noted at this time. Pt given and explained medication list and prescriptions. Pt verbalizes complete understanding of all discharge instructions and follow up care. Pt given printed After Visit Summary. VAD equipment reconciliation completed. Family at bedside. Awaiting escort. Will continue to monitor.

## 2017-11-24 ENCOUNTER — TELEPHONE (OUTPATIENT)
Dept: ELECTROPHYSIOLOGY | Facility: CLINIC | Age: 67
End: 2017-11-24

## 2017-11-24 NOTE — PROGRESS NOTES
"Per discharge dose "Had AICD lead revision by Dr. Ash. Admitted to [facility] and  stopped. SVO2 from PICC after  off was 54 (of note, on  it was 53). PICC line was pulled and coumadin boosted. Overnight he developed a chest wall hematoma and EP applied pressure dressing to stay on for 48 hours. He will dc home to return to wound clinic on Monday." INR and dosing while inpatient are in the calendar. Patient's wife confirms discharge dose of 2mg daily except 1mg 11/22. He will have an INR Monday in labs.  "

## 2017-11-24 NOTE — TELEPHONE ENCOUNTER
----- Message from Mishel Johnson sent at 11/24/2017 12:02 PM CST -----  Contact: pt wife  Please call pt wife at [phone]. Patient is having some pain in the surgical site area. Patient had a ICD Lead Revision on 11/20/17 by Dr Jacque Rodriguez    Thank you

## 2017-11-27 ENCOUNTER — ANTI-COAG VISIT (OUTPATIENT)
Dept: CARDIOLOGY | Facility: CLINIC | Age: 67
End: 2017-11-27

## 2017-11-27 DIAGNOSIS — Z79.01 ANTICOAGULATION MONITORING, INR RANGE 2-3: ICD-10-CM

## 2017-11-27 DIAGNOSIS — Z95.811 LVAD (LEFT VENTRICULAR ASSIST DEVICE) PRESENT: ICD-10-CM

## 2017-11-27 LAB — INR PPP: 1.8

## 2017-11-27 NOTE — PHYSICIAN QUERY
PT Name: Suman Hayden  MR #: [id number]    Physician Query Form - Relationship to Procedure Clarification     CDS/: Flora Michelle               Contact information: [email or web address]      This form is a permanent document in the medical record.     Query Date: November 27, 2017      By submitting this query, we are merely seeking further clarification of documentation. Please utilize your independent clinical judgment when addressing the question(s) below.    The Medical record contains the following:  Supporting Clinical Findings Location in Medical Record    Overnight he developed a chest wall hematoma and EP applied pressure dressing to stay on for 48 hours. He will dc home to return to wound clinic on Monday. Discharge summary        Please clarify if _the hematoma_ is    [x  ] Inherent/Integral to procedure  [  ] Routine outcome  [  ] Incidental finding  [  ] Complication of procedure  [  ] Clinically insignificant  [  ] Clinically undetermined

## 2017-11-28 ENCOUNTER — HOSPITAL ENCOUNTER (EMERGENCY)
Facility: HOSPITAL | Age: 67
Discharge: HOME | End: 2017-11-29
Attending: SPECIALIST
Payer: MEDICARE

## 2017-11-28 ENCOUNTER — TELEPHONE (OUTPATIENT)
Dept: ELECTROPHYSIOLOGY | Facility: CLINIC | Age: 67
End: 2017-11-28

## 2017-11-28 DIAGNOSIS — T82.9XXA PACEMAKER COMPLICATIONS, INITIAL ENCOUNTER: Primary | ICD-10-CM

## 2017-11-28 DIAGNOSIS — I47.19 ATRIAL TACHYCARDIA: ICD-10-CM

## 2017-11-28 LAB
ALBUMIN SERPL BCP-MCNC: 3.1 G/DL
ALP SERPL-CCNC: 74 U/L
ALT SERPL W/O P-5'-P-CCNC: 9 U/L
ANION GAP SERPL CALC-SCNC: 12 MMOL/L
APTT BLDCRRT: 32.5 SEC
AST SERPL-CCNC: 20 U/L
BASOPHILS # BLD AUTO: 0.03 K/UL
BASOPHILS NFR BLD: 0.4 %
BILIRUB SERPL-MCNC: 0.5 MG/DL
BILIRUB UR QL STRIP: NEGATIVE
BNP SERPL-MCNC: 901 PG/ML
BUN SERPL-MCNC: 15 MG/DL
CALCIUM SERPL-MCNC: 9.7 MG/DL
CHLORIDE SERPL-SCNC: 101 MMOL/L
CK SERPL-CCNC: 51 U/L
CLARITY UR: CLEAR
CO2 SERPL-SCNC: 27 MMOL/L
COLOR UR: YELLOW
CREAT SERPL-MCNC: 1 MG/DL
DIFFERENTIAL METHOD: ABNORMAL
EOSINOPHIL # BLD AUTO: 0 K/UL
EOSINOPHIL NFR BLD: 0.4 %
ERYTHROCYTE [DISTWIDTH] IN BLOOD BY AUTOMATED COUNT: 15.9 %
EST. GFR  (AFRICAN AMERICAN): >60 ML/MIN/1.73 M^2
EST. GFR  (NON AFRICAN AMERICAN): >60 ML/MIN/1.73 M^2
GLUCOSE SERPL-MCNC: 108 MG/DL
GLUCOSE UR QL STRIP: NEGATIVE
HCT VFR BLD AUTO: 26.6 %
HGB BLD-MCNC: 8.5 G/DL
HGB UR QL STRIP: NEGATIVE
INR PPP: 1.9
KETONES UR QL STRIP: NEGATIVE
LEUKOCYTE ESTERASE UR QL STRIP: NEGATIVE
LYMPHOCYTES # BLD AUTO: 1.1 K/UL
LYMPHOCYTES NFR BLD: 12.9 %
MAGNESIUM SERPL-MCNC: 2.1 MG/DL
MCH RBC QN AUTO: 25 PG
MCHC RBC AUTO-ENTMCNC: 32 G/DL
MCV RBC AUTO: 78 FL
MONOCYTES # BLD AUTO: 0.7 K/UL
MONOCYTES NFR BLD: 8.3 %
NEUTROPHILS # BLD AUTO: 6.4 K/UL
NEUTROPHILS NFR BLD: 78 %
NITRITE UR QL STRIP: NEGATIVE
PH UR STRIP: 8 [PH] (ref 5–8)
PLATELET # BLD AUTO: 310 K/UL
PMV BLD AUTO: 9.1 FL
POTASSIUM SERPL-SCNC: 4 MMOL/L
PROT SERPL-MCNC: 7.4 G/DL
PROT UR QL STRIP: NEGATIVE
PROTHROMBIN TIME: 19.5 SEC
RBC # BLD AUTO: 3.4 M/UL
SODIUM SERPL-SCNC: 140 MMOL/L
SP GR UR STRIP: 1.01 (ref 1–1.03)
TROPONIN I SERPL DL<=0.01 NG/ML-MCNC: 0.05 NG/ML
URN SPEC COLLECT METH UR: NORMAL
UROBILINOGEN UR STRIP-ACNC: NEGATIVE EU/DL
WBC # BLD AUTO: 8.15 K/UL

## 2017-11-28 PROCEDURE — 99285 EMERGENCY DEPT VISIT HI MDM: CPT | Mod: 25

## 2017-11-28 PROCEDURE — 85025 COMPLETE CBC W/AUTO DIFF WBC: CPT

## 2017-11-28 PROCEDURE — 83735 ASSAY OF MAGNESIUM: CPT

## 2017-11-28 PROCEDURE — 81003 URINALYSIS AUTO W/O SCOPE: CPT

## 2017-11-28 PROCEDURE — 85730 THROMBOPLASTIN TIME PARTIAL: CPT

## 2017-11-28 PROCEDURE — 93005 ELECTROCARDIOGRAM TRACING: CPT

## 2017-11-28 PROCEDURE — 85610 PROTHROMBIN TIME: CPT

## 2017-11-28 PROCEDURE — 80053 COMPREHEN METABOLIC PANEL: CPT

## 2017-11-28 PROCEDURE — 82550 ASSAY OF CK (CPK): CPT

## 2017-11-28 PROCEDURE — 84484 ASSAY OF TROPONIN QUANT: CPT

## 2017-11-28 PROCEDURE — 93010 ELECTROCARDIOGRAM REPORT: CPT | Mod: ,,, | Performed by: INTERNAL MEDICINE

## 2017-11-28 PROCEDURE — 83880 ASSAY OF NATRIURETIC PEPTIDE: CPT

## 2017-11-28 PROCEDURE — 25000003 PHARM REV CODE 250: Performed by: EMERGENCY MEDICINE

## 2017-11-28 RX ORDER — HYDRALAZINE HYDROCHLORIDE 25 MG/1
50 TABLET, FILM COATED ORAL
Status: DISCONTINUED | OUTPATIENT
Start: 2017-11-28 | End: 2017-11-29 | Stop reason: HOSPADM

## 2017-11-28 RX ORDER — WARFARIN 3 MG/1
3 TABLET ORAL
Status: DISCONTINUED | OUTPATIENT
Start: 2017-11-28 | End: 2017-11-29 | Stop reason: HOSPADM

## 2017-11-28 RX ORDER — WARFARIN 2 MG/1
2 TABLET ORAL
Status: DISCONTINUED | OUTPATIENT
Start: 2017-11-30 | End: 2017-11-29 | Stop reason: HOSPADM

## 2017-11-28 RX ORDER — WARFARIN 2 MG/1
2 TABLET ORAL
Status: DISCONTINUED | OUTPATIENT
Start: 2017-11-29 | End: 2017-11-29 | Stop reason: HOSPADM

## 2017-11-28 RX ORDER — WARFARIN 2 MG/1
2 TABLET ORAL
Status: DISCONTINUED | OUTPATIENT
Start: 2017-12-02 | End: 2017-11-29 | Stop reason: HOSPADM

## 2017-11-28 RX ORDER — PRAVASTATIN SODIUM 20 MG/1
20 TABLET ORAL DAILY
Status: DISCONTINUED | OUTPATIENT
Start: 2017-11-29 | End: 2017-11-29 | Stop reason: HOSPADM

## 2017-11-28 RX ORDER — POTASSIUM CHLORIDE 20 MEQ/1
40 TABLET, EXTENDED RELEASE ORAL
Status: COMPLETED | OUTPATIENT
Start: 2017-11-28 | End: 2017-11-28

## 2017-11-28 RX ORDER — ONDANSETRON 4 MG/1
4 TABLET, ORALLY DISINTEGRATING ORAL
Status: COMPLETED | OUTPATIENT
Start: 2017-11-28 | End: 2017-11-28

## 2017-11-28 RX ADMIN — WARFARIN SODIUM 3 MG: 3 TABLET ORAL at 09:11

## 2017-11-28 RX ADMIN — ONDANSETRON 4 MG: 4 TABLET, ORALLY DISINTEGRATING ORAL at 09:11

## 2017-11-28 RX ADMIN — POTASSIUM CHLORIDE 40 MEQ: 1500 TABLET, EXTENDED RELEASE ORAL at 09:11

## 2017-11-28 NOTE — ED PROVIDER NOTES
SCRIBE #1 NOTE: I, Dc Lozoya, am scribing for, and in the presence of, Karen Coleman MD. I have scribed the entire note.      History      Chief Complaint   Patient presents with    Pacemaker Problem     pt defibrillator went off 4 times this morning, pt is an LVAD pt, denies any chest pains       Review of patient's allergies indicates:  No Known Allergies     HPI   HPI    11/28/2017, 11:12 AM   History obtained from the patient      History of Present Illness: Suman Hayden is a 67 y.o. male patient who presents to the Emergency Department for an evaluation of his pacemaker which onset suddenly today. Pt reports he was shocked x4 today by his pacemaker. Pt also reports he had the leads of his pacemaker adjusted on November 20th. Symptoms are constant and moderate in severity. Exacerbated by nothing and relieved by nothing. Patient denies any SOB, diaphoresis, palpitations, extremity weakness/numbness, leg pain/swelling, dizziness, cough, n/v, CP, and all other sxs at this time. No further complaints or concerns at this time.       Arrival mode: EMS    PCP: Joe Ernst MD       Past Medical History:  Past Medical History:   Diagnosis Date    Cardiomyopathy     Hyperlipidemia     Hypertension     Obesity     S/P implantation of automatic cardioverter/defibrillator (AICD)     Ventricular tachycardia        Past Surgical History:  Past Surgical History:   Procedure Laterality Date    CARDIAC CATHETERIZATION      CARDIAC DEFIBRILLATOR PLACEMENT      LEFT VENTRICULAR ASSIST DEVICE  07/27/2017         Family History:  Family History   Problem Relation Age of Onset    Heart disease Mother     Heart disease Father        Social History:  Social History     Social History Main Topics    Smoking status: Never Smoker    Smokeless tobacco: Never Used    Alcohol use No    Drug use: Unknown    Sexual activity: Unknown       ROS   Review of Systems   Constitutional: Negative for chills,  "diaphoresis and fever.        (+) Pacemaker problem   HENT: Negative for sore throat and trouble swallowing.    Respiratory: Negative for shortness of breath.    Cardiovascular: Negative for chest pain, palpitations and leg swelling.   Gastrointestinal: Negative for abdominal pain, nausea and vomiting.   Genitourinary: Negative for dysuria and hematuria.   Musculoskeletal: Negative for back pain.   Skin: Negative for rash.   Neurological: Negative for weakness, light-headedness and headaches.   Hematological: Does not bruise/bleed easily.     Physical Exam      Initial Vitals   BP Pulse Resp Temp SpO2   -- -- -- -- --      MAP       --          Physical Exam  Nursing Notes and Vital Signs Reviewed.  Constitutional: Patient is in no acute distress. Well-developed and well-nourished.  Head: Atraumatic. Normocephalic.  Eyes: PERRL. EOM intact. Conjunctivae are not pale. No scleral icterus.  ENT: Mucous membranes are moist. Oropharynx is clear and symmetric.    Neck: Supple. Full ROM. No lymphadenopathy.  Cardiovascular: Regular rate. Regular rhythm.  Pulmonary/Chest: No respiratory distress. Clear to auscultation bilaterally. No wheezing or rales.  Abdominal: Soft and non-distended. There is no tenderness.    Musculoskeletal: Moves all extremities. No obvious deformities. No edema. No calf tenderness.  Skin: Warm and dry.  Neurological:  Alert, awake, and appropriate.  Normal speech.  No acute focal neurological deficits are appreciated.  Psychiatric: Normal affect. Good eye contact. Appropriate in content.    ED Course    Procedures  ED Vital Signs:  Vitals:    11/28/17 1113   BP: 96/73   Pulse: 80   Resp: 18   Temp: 98.3 °F (36.8 °C)   TempSrc: Oral   SpO2: 100%   Weight: 63.5 kg (140 lb)   Height: 5' 8" (1.727 m)       Abnormal Lab Results:  Labs Reviewed   CBC W/ AUTO DIFFERENTIAL - Abnormal; Notable for the following:        Result Value    RBC 3.40 (*)     Hemoglobin 8.5 (*)     Hematocrit 26.6 (*)     MCV 78 (*) "     MCH 25.0 (*)     RDW 15.9 (*)     MPV 9.1 (*)     Gran% 78.0 (*)     Lymph% 12.9 (*)     All other components within normal limits   COMPREHENSIVE METABOLIC PANEL   MAGNESIUM   CK   TROPONIN I   URINALYSIS   PROTIME-INR   APTT   B-TYPE NATRIURETIC PEPTIDE        All Lab Results:  Results for orders placed or performed during the hospital encounter of 11/28/17   CBC auto differential   Result Value Ref Range    WBC 8.15 3.90 - 12.70 K/uL    RBC 3.40 (L) 4.60 - 6.20 M/uL    Hemoglobin 8.5 (L) 14.0 - 18.0 g/dL    Hematocrit 26.6 (L) 40.0 - 54.0 %    MCV 78 (L) 82 - 98 fL    MCH 25.0 (L) 27.0 - 31.0 pg    MCHC 32.0 32.0 - 36.0 g/dL    RDW 15.9 (H) 11.5 - 14.5 %    Platelets 310 150 - 350 K/uL    MPV 9.1 (L) 9.2 - 12.9 fL    Gran # 6.4 1.8 - 7.7 K/uL    Lymph # 1.1 1.0 - 4.8 K/uL    Mono # 0.7 0.3 - 1.0 K/uL    Eos # 0.0 0.0 - 0.5 K/uL    Baso # 0.03 0.00 - 0.20 K/uL    Gran% 78.0 (H) 38.0 - 73.0 %    Lymph% 12.9 (L) 18.0 - 48.0 %    Mono% 8.3 4.0 - 15.0 %    Eosinophil% 0.4 0.0 - 8.0 %    Basophil% 0.4 0.0 - 1.9 %    Differential Method Automated    Comprehensive metabolic panel   Result Value Ref Range    Sodium 140 136 - 145 mmol/L    Potassium 4.0 3.5 - 5.1 mmol/L    Chloride 101 95 - 110 mmol/L    CO2 27 23 - 29 mmol/L    Glucose 108 70 - 110 mg/dL    BUN, Bld 15 8 - 23 mg/dL    Creatinine 1.0 0.5 - 1.4 mg/dL    Calcium 9.7 8.7 - 10.5 mg/dL    Total Protein 7.4 6.0 - 8.4 g/dL    Albumin 3.1 (L) 3.5 - 5.2 g/dL    Total Bilirubin 0.5 0.1 - 1.0 mg/dL    Alkaline Phosphatase 74 55 - 135 U/L    AST 20 10 - 40 U/L    ALT 9 (L) 10 - 44 U/L    Anion Gap 12 8 - 16 mmol/L    eGFR if African American >60 >60 mL/min/1.73 m^2    eGFR if non African American >60 >60 mL/min/1.73 m^2   Magnesium   Result Value Ref Range    Magnesium 2.1 1.6 - 2.6 mg/dL   CK   Result Value Ref Range    CPK 51 20 - 200 U/L   Troponin I   Result Value Ref Range    Troponin I 0.049 (H) 0.000 - 0.026 ng/mL   Urinalysis   Result Value Ref Range     Specimen UA Urine, Clean Catch     Color, UA Yellow Yellow, Straw, [place]    Appearance, UA Clear Clear    pH, UA 8.0 5.0 - 8.0    Specific Gravity, UA 1.010 1.005 - 1.030    Protein, UA Negative Negative    Glucose, UA Negative Negative    Ketones, UA Negative Negative    Bilirubin (UA) Negative Negative    Occult Blood UA Negative Negative    Nitrite, UA Negative Negative    Urobilinogen, UA Negative <2.0 EU/dL    Leukocytes, UA Negative Negative   Protime-INR   Result Value Ref Range    Prothrombin Time 19.5 (H) 9.0 - 12.5 sec    INR 1.9 (H) 0.8 - 1.2   APTT   Result Value Ref Range    aPTT 32.5 (H) 21.0 - 32.0 sec   Brain natriuretic peptide   Result Value Ref Range     (H) 0 - 99 pg/mL         Imaging Results:  Imaging Results          X-Ray Chest 1 View (Final result)  Result time 11/28/17 12:00:29    Final result by Baljinder Veloz MD (11/28/17 12:00:29)                 Impression:     No adverse interval change      Electronically signed by: BALJINDER VELOZ MD  Date:     11/28/17  Time:    12:00              Narrative:    History: Chest pain    Comparison: 9/18/17    Result: Single view of the chest.       In comparison to the prior study, there is no adverse interval changes.                                  The EKG was ordered, reviewed, and independently interpreted by the ED provider.  Interpretation time: 12:01  Rate: 97 BPM  Rhythm: Ventricular-paced rhythm  Interpretation: No STEMI.        The Emergency Provider reviewed the vital signs and test results, which are outlined above.    ED Discussion     1:12 PM: Dr. Coleman discussed the pt's case with Dr. Holder (Cardiology).    1:27 PM: Dr. Coleman discussed the pt's case with Dr. Holder (Cardiology) who recommends contacting the LVAD coordinator. .    1:49 PM: Dr. Coleman discussed the pt's case with LVAD supervisor who recommends transferring to home campus.    2:04 PM: Consult with Dr. Duff (Cardiology) at [facility] concerning pt. There are  no LVAD services, which the patient requires, offered at [facility] at this time. Dr. Duff expresses understanding and will accept transfer for LVAD services and electrophysiology.  Accepting Facility: [facility]  Accepting Physician: Dr. Duff    1:51 PM: Re-evaluated pt. Informed pt and family that there are no LVAD services available at this time. I have discussed test results, shared treatment plan, and the need for transfer with patient and family at bedside. All historical, clinical, radiographic, and laboratory findings were reviewed with the patient/family in detail. Patient will be transferred by [facility] services with BLS care required en route. Patient understands that there could be unforeseen motor vehicle accidents or loss of vital signs that could result in potential death or permanent disability. Pt and family express understanding at this time and agree with all information. All questions answered. Pt and family have no further questions or concerns at this time. Pt is ready for transfer.         ED Medication(s):  Medications - No data to display    New Prescriptions    No medications on file             Medical Decision Making    Medical Decision Making:   Clinical Tests:   Lab Tests: Ordered and Reviewed  Radiological Study: Ordered and Reviewed  Medical Tests: Ordered and Reviewed           Scribe Attestation:   Scribe #1: I performed the above scribed service and the documentation accurately describes the services I performed. I attest to the accuracy of the note.    Attending:   Physician Attestation Statement for Scribe #1: I, Karen Coleman MD, personally performed the services described in this documentation, as scribed by Dc Lozoya, in my presence, and it is both accurate and complete.          Clinical Impression     No diagnosis found.    Disposition:   Disposition: Transferred  Condition: Stable         Karen Coleman MD  12/08/17 0639

## 2017-11-28 NOTE — TELEPHONE ENCOUNTER
----- Message from Gricelda Joshi sent at 11/28/2017 11:12 AM CST -----  Contact: pt's wife  Pt's wife is calling to let you know that her  is being rushed to the hospital by Ambulance.  He was sitting on the toilet and his defibrillator shocked him several times.  She can be reached at [phone].    Thank you

## 2017-11-28 NOTE — ED NOTES
Called to check with transfer center about getting patient a bed, they state it will be a while due to the number of holds in their emergency department. They will let us know.

## 2017-11-29 ENCOUNTER — HOSPITAL ENCOUNTER (INPATIENT)
Facility: HOSPITAL | Age: 67
LOS: 2 days | Discharge: HOME HEALTH | DRG: 315 | End: 2017-12-01
Attending: INTERNAL MEDICINE | Admitting: INTERNAL MEDICINE
Payer: MEDICARE

## 2017-11-29 VITALS
TEMPERATURE: 98 F | HEART RATE: 80 BPM | RESPIRATION RATE: 16 BRPM | DIASTOLIC BLOOD PRESSURE: 76 MMHG | SYSTOLIC BLOOD PRESSURE: 95 MMHG | WEIGHT: 140 LBS | OXYGEN SATURATION: 100 % | HEIGHT: 68 IN | BODY MASS INDEX: 21.22 KG/M2

## 2017-11-29 DIAGNOSIS — Z45.02 DEFIBRILLATOR DISCHARGE: ICD-10-CM

## 2017-11-29 DIAGNOSIS — Z95.811 LVAD (LEFT VENTRICULAR ASSIST DEVICE) PRESENT: Primary | ICD-10-CM

## 2017-11-29 DIAGNOSIS — Z95.811 HEART REPLACED BY HEART ASSIST DEVICE: ICD-10-CM

## 2017-11-29 PROBLEM — T82.837A ICD (IMPLANTABLE CARDIOVERTER-DEFIBRILLATOR) POCKET HEMATOMA: Status: ACTIVE | Noted: 2017-11-29

## 2017-11-29 LAB
ALBUMIN SERPL BCP-MCNC: 2.6 G/DL
ALP SERPL-CCNC: 55 U/L
ALT SERPL W/O P-5'-P-CCNC: 6 U/L
ANION GAP SERPL CALC-SCNC: 6 MMOL/L
ANION GAP SERPL CALC-SCNC: 8 MMOL/L
AST SERPL-CCNC: 19 U/L
BASOPHILS # BLD AUTO: 0.03 K/UL
BASOPHILS # BLD AUTO: 0.05 K/UL
BASOPHILS NFR BLD: 0.4 %
BASOPHILS NFR BLD: 0.7 %
BILIRUB SERPL-MCNC: 0.6 MG/DL
BNP SERPL-MCNC: 1127 PG/ML
BUN SERPL-MCNC: 12 MG/DL
BUN SERPL-MCNC: 12 MG/DL
CALCIUM SERPL-MCNC: 8.8 MG/DL
CALCIUM SERPL-MCNC: 9 MG/DL
CHLORIDE SERPL-SCNC: 101 MMOL/L
CHLORIDE SERPL-SCNC: 101 MMOL/L
CO2 SERPL-SCNC: 28 MMOL/L
CO2 SERPL-SCNC: 29 MMOL/L
CREAT SERPL-MCNC: 0.9 MG/DL
CREAT SERPL-MCNC: 0.9 MG/DL
CRP SERPL-MCNC: 7.7 MG/L
DIFFERENTIAL METHOD: ABNORMAL
DIFFERENTIAL METHOD: ABNORMAL
EOSINOPHIL # BLD AUTO: 0.3 K/UL
EOSINOPHIL # BLD AUTO: 0.4 K/UL
EOSINOPHIL NFR BLD: 4.9 %
EOSINOPHIL NFR BLD: 5.2 %
ERYTHROCYTE [DISTWIDTH] IN BLOOD BY AUTOMATED COUNT: 16.3 %
ERYTHROCYTE [DISTWIDTH] IN BLOOD BY AUTOMATED COUNT: 16.4 %
EST. GFR  (AFRICAN AMERICAN): >60 ML/MIN/1.73 M^2
EST. GFR  (AFRICAN AMERICAN): >60 ML/MIN/1.73 M^2
EST. GFR  (NON AFRICAN AMERICAN): >60 ML/MIN/1.73 M^2
EST. GFR  (NON AFRICAN AMERICAN): >60 ML/MIN/1.73 M^2
FACT X PPP CHRO-ACNC: 0.16 IU/ML
FACT X PPP CHRO-ACNC: <0.1 IU/ML
GLUCOSE SERPL-MCNC: 77 MG/DL
GLUCOSE SERPL-MCNC: 79 MG/DL
HCT VFR BLD AUTO: 21.7 %
HCT VFR BLD AUTO: 22.6 %
HGB BLD-MCNC: 7 G/DL
HGB BLD-MCNC: 7.2 G/DL
IMM GRANULOCYTES # BLD AUTO: 0.01 K/UL
IMM GRANULOCYTES # BLD AUTO: 0.02 K/UL
IMM GRANULOCYTES NFR BLD AUTO: 0.1 %
IMM GRANULOCYTES NFR BLD AUTO: 0.3 %
INR PPP: 1.8
LDH SERPL L TO P-CCNC: 167 U/L
LYMPHOCYTES # BLD AUTO: 1.7 K/UL
LYMPHOCYTES # BLD AUTO: 1.9 K/UL
LYMPHOCYTES NFR BLD: 24 %
LYMPHOCYTES NFR BLD: 25.9 %
MAGNESIUM SERPL-MCNC: 1.8 MG/DL
MAGNESIUM SERPL-MCNC: 2 MG/DL
MCH RBC QN AUTO: 25.1 PG
MCH RBC QN AUTO: 25.3 PG
MCHC RBC AUTO-ENTMCNC: 31.9 G/DL
MCHC RBC AUTO-ENTMCNC: 32.3 G/DL
MCV RBC AUTO: 78 FL
MCV RBC AUTO: 79 FL
MONOCYTES # BLD AUTO: 0.7 K/UL
MONOCYTES # BLD AUTO: 0.8 K/UL
MONOCYTES NFR BLD: 10.9 %
MONOCYTES NFR BLD: 9.1 %
NEUTROPHILS # BLD AUTO: 4.1 K/UL
NEUTROPHILS # BLD AUTO: 4.3 K/UL
NEUTROPHILS NFR BLD: 58.8 %
NEUTROPHILS NFR BLD: 59.7 %
NRBC BLD-RTO: 0 /100 WBC
NRBC BLD-RTO: 0 /100 WBC
PLATELET # BLD AUTO: 268 K/UL
PLATELET # BLD AUTO: 286 K/UL
PMV BLD AUTO: 10.9 FL
PMV BLD AUTO: 9.8 FL
POTASSIUM SERPL-SCNC: 3.8 MMOL/L
POTASSIUM SERPL-SCNC: 3.9 MMOL/L
PREALB SERPL-MCNC: 16 MG/DL
PROT SERPL-MCNC: 6.2 G/DL
PROTHROMBIN TIME: 18.4 SEC
RBC # BLD AUTO: 2.77 M/UL
RBC # BLD AUTO: 2.87 M/UL
SODIUM SERPL-SCNC: 136 MMOL/L
SODIUM SERPL-SCNC: 137 MMOL/L
WBC # BLD AUTO: 6.95 K/UL
WBC # BLD AUTO: 7.33 K/UL

## 2017-11-29 PROCEDURE — 80053 COMPREHEN METABOLIC PANEL: CPT

## 2017-11-29 PROCEDURE — 36415 COLL VENOUS BLD VENIPUNCTURE: CPT

## 2017-11-29 PROCEDURE — 20600001 HC STEP DOWN PRIVATE ROOM

## 2017-11-29 PROCEDURE — 85025 COMPLETE CBC W/AUTO DIFF WBC: CPT

## 2017-11-29 PROCEDURE — 27000248 HC VAD-ADDITIONAL DAY

## 2017-11-29 PROCEDURE — 25000003 PHARM REV CODE 250: Performed by: PHYSICIAN ASSISTANT

## 2017-11-29 PROCEDURE — 83735 ASSAY OF MAGNESIUM: CPT

## 2017-11-29 PROCEDURE — 25000003 PHARM REV CODE 250: Performed by: INTERNAL MEDICINE

## 2017-11-29 PROCEDURE — 85610 PROTHROMBIN TIME: CPT

## 2017-11-29 PROCEDURE — 93750 INTERROGATION VAD IN PERSON: CPT | Mod: ,,, | Performed by: INTERNAL MEDICINE

## 2017-11-29 PROCEDURE — 83615 LACTATE (LD) (LDH) ENZYME: CPT

## 2017-11-29 PROCEDURE — 83880 ASSAY OF NATRIURETIC PEPTIDE: CPT

## 2017-11-29 PROCEDURE — 84134 ASSAY OF PREALBUMIN: CPT

## 2017-11-29 PROCEDURE — 80048 BASIC METABOLIC PNL TOTAL CA: CPT

## 2017-11-29 PROCEDURE — 86140 C-REACTIVE PROTEIN: CPT

## 2017-11-29 PROCEDURE — 63600175 PHARM REV CODE 636 W HCPCS: Performed by: PHYSICIAN ASSISTANT

## 2017-11-29 PROCEDURE — 83735 ASSAY OF MAGNESIUM: CPT | Mod: 91

## 2017-11-29 PROCEDURE — 85520 HEPARIN ASSAY: CPT | Mod: 91

## 2017-11-29 PROCEDURE — 93750 INTERROGATION VAD IN PERSON: CPT | Performed by: INTERNAL MEDICINE

## 2017-11-29 PROCEDURE — 85520 HEPARIN ASSAY: CPT

## 2017-11-29 PROCEDURE — 99024 POSTOP FOLLOW-UP VISIT: CPT | Mod: GC,,, | Performed by: INTERNAL MEDICINE

## 2017-11-29 RX ORDER — PRAVASTATIN SODIUM 20 MG/1
20 TABLET ORAL NIGHTLY
Status: DISCONTINUED | OUTPATIENT
Start: 2017-11-29 | End: 2017-12-01 | Stop reason: HOSPADM

## 2017-11-29 RX ORDER — NAPROXEN SODIUM 220 MG/1
81 TABLET, FILM COATED ORAL DAILY
Status: DISCONTINUED | OUTPATIENT
Start: 2017-11-29 | End: 2017-12-01 | Stop reason: HOSPADM

## 2017-11-29 RX ORDER — PANTOPRAZOLE SODIUM 40 MG/1
40 TABLET, DELAYED RELEASE ORAL DAILY
Status: DISCONTINUED | OUTPATIENT
Start: 2017-11-29 | End: 2017-12-01 | Stop reason: HOSPADM

## 2017-11-29 RX ORDER — HYDROCODONE BITARTRATE AND ACETAMINOPHEN 5; 325 MG/1; MG/1
1 TABLET ORAL EVERY 6 HOURS PRN
Status: DISCONTINUED | OUTPATIENT
Start: 2017-11-29 | End: 2017-12-01 | Stop reason: HOSPADM

## 2017-11-29 RX ORDER — FUROSEMIDE 80 MG/1
80 TABLET ORAL 2 TIMES DAILY
Status: DISCONTINUED | OUTPATIENT
Start: 2017-11-29 | End: 2017-12-01 | Stop reason: HOSPADM

## 2017-11-29 RX ORDER — AMLODIPINE BESYLATE 10 MG/1
10 TABLET ORAL DAILY
Status: DISCONTINUED | OUTPATIENT
Start: 2017-11-29 | End: 2017-12-01 | Stop reason: HOSPADM

## 2017-11-29 RX ORDER — LANOLIN ALCOHOL/MO/W.PET/CERES
400 CREAM (GRAM) TOPICAL 2 TIMES DAILY
Status: DISCONTINUED | OUTPATIENT
Start: 2017-11-29 | End: 2017-12-01 | Stop reason: HOSPADM

## 2017-11-29 RX ORDER — HEPARIN SODIUM 10000 [USP'U]/100ML
1000 INJECTION, SOLUTION INTRAVENOUS CONTINUOUS
Status: DISCONTINUED | OUTPATIENT
Start: 2017-11-29 | End: 2017-12-01

## 2017-11-29 RX ORDER — GLYCERIN 1 G/1
1 SUPPOSITORY RECTAL ONCE
Status: COMPLETED | OUTPATIENT
Start: 2017-11-29 | End: 2017-11-29

## 2017-11-29 RX ORDER — POTASSIUM CHLORIDE 750 MG/1
10 CAPSULE, EXTENDED RELEASE ORAL ONCE
Status: COMPLETED | OUTPATIENT
Start: 2017-11-29 | End: 2017-11-29

## 2017-11-29 RX ORDER — GLYCERIN 1 G/1
1 SUPPOSITORY RECTAL ONCE
Status: DISCONTINUED | OUTPATIENT
Start: 2017-11-29 | End: 2017-12-01 | Stop reason: HOSPADM

## 2017-11-29 RX ORDER — ONDANSETRON 4 MG/1
4 TABLET, ORALLY DISINTEGRATING ORAL EVERY 8 HOURS PRN
Status: DISCONTINUED | OUTPATIENT
Start: 2017-11-29 | End: 2017-12-01 | Stop reason: HOSPADM

## 2017-11-29 RX ORDER — WARFARIN SODIUM 5 MG/1
5 TABLET ORAL ONCE
Status: COMPLETED | OUTPATIENT
Start: 2017-11-29 | End: 2017-11-29

## 2017-11-29 RX ORDER — HYDRALAZINE HYDROCHLORIDE 50 MG/1
50 TABLET, FILM COATED ORAL EVERY 8 HOURS
Status: DISCONTINUED | OUTPATIENT
Start: 2017-11-29 | End: 2017-12-01 | Stop reason: HOSPADM

## 2017-11-29 RX ORDER — WARFARIN 2.5 MG/1
2.5 TABLET ORAL DAILY
Status: DISCONTINUED | OUTPATIENT
Start: 2017-11-29 | End: 2017-11-29

## 2017-11-29 RX ORDER — WARFARIN 2 MG/1
2 TABLET ORAL DAILY
Status: DISCONTINUED | OUTPATIENT
Start: 2017-11-30 | End: 2017-11-30

## 2017-11-29 RX ORDER — WARFARIN 2 MG/1
2 TABLET ORAL DAILY
Status: DISCONTINUED | OUTPATIENT
Start: 2017-11-29 | End: 2017-11-29

## 2017-11-29 RX ORDER — POTASSIUM CHLORIDE 20 MEQ/1
40 TABLET, EXTENDED RELEASE ORAL 2 TIMES DAILY
Status: DISCONTINUED | OUTPATIENT
Start: 2017-11-29 | End: 2017-12-01 | Stop reason: HOSPADM

## 2017-11-29 RX ORDER — GLYCERIN 1 G/1
1 SUPPOSITORY RECTAL ONCE
Status: DISCONTINUED | OUTPATIENT
Start: 2017-11-29 | End: 2017-11-29

## 2017-11-29 RX ADMIN — PRAVASTATIN SODIUM 20 MG: 20 TABLET ORAL at 09:11

## 2017-11-29 RX ADMIN — POTASSIUM CHLORIDE 40 MEQ: 1500 TABLET, EXTENDED RELEASE ORAL at 09:11

## 2017-11-29 RX ADMIN — HYDRALAZINE HYDROCHLORIDE 50 MG: 50 TABLET ORAL at 06:11

## 2017-11-29 RX ADMIN — ASPIRIN 81 MG CHEWABLE TABLET 81 MG: 81 TABLET CHEWABLE at 08:11

## 2017-11-29 RX ADMIN — PRAVASTATIN SODIUM 20 MG: 20 TABLET ORAL at 06:11

## 2017-11-29 RX ADMIN — PANTOPRAZOLE SODIUM 40 MG: 40 TABLET, DELAYED RELEASE ORAL at 08:11

## 2017-11-29 RX ADMIN — GLYCERIN 1 SUPPOSITORY: 2.1 SUPPOSITORY RECTAL at 11:11

## 2017-11-29 RX ADMIN — HYDRALAZINE HYDROCHLORIDE 50 MG: 50 TABLET ORAL at 01:11

## 2017-11-29 RX ADMIN — HYDRALAZINE HYDROCHLORIDE 50 MG: 50 TABLET ORAL at 09:11

## 2017-11-29 RX ADMIN — AMLODIPINE BESYLATE 10 MG: 10 TABLET ORAL at 08:11

## 2017-11-29 RX ADMIN — FUROSEMIDE 80 MG: 80 TABLET ORAL at 08:11

## 2017-11-29 RX ADMIN — FUROSEMIDE 80 MG: 80 TABLET ORAL at 09:11

## 2017-11-29 RX ADMIN — WARFARIN SODIUM 5 MG: 5 TABLET ORAL at 05:11

## 2017-11-29 RX ADMIN — MAGNESIUM OXIDE TAB 400 MG (241.3 MG ELEMENTAL MG) 400 MG: 400 (241.3 MG) TAB at 09:11

## 2017-11-29 RX ADMIN — POTASSIUM CHLORIDE 10 MEQ: 750 CAPSULE, EXTENDED RELEASE ORAL at 05:11

## 2017-11-29 RX ADMIN — MAGNESIUM OXIDE TAB 400 MG (241.3 MG ELEMENTAL MG) 400 MG: 400 (241.3 MG) TAB at 08:11

## 2017-11-29 RX ADMIN — HEPARIN SODIUM AND DEXTROSE 800 UNITS/HR: 10000; 5 INJECTION INTRAVENOUS at 01:11

## 2017-11-29 RX ADMIN — POTASSIUM CHLORIDE 40 MEQ: 1500 TABLET, EXTENDED RELEASE ORAL at 08:11

## 2017-11-29 NOTE — PROCEDURES
Patient in bed with wife at bedside. VAD interrogation completed this AM in the event changes needed to be made. Will continue to monitor for further issues.     Pulsatile: Yes   VAD Sounds: HM3  Smooth  Problems / Issues / Alarms with VAD if any: None noted  HCT: 21.7     VAD Interrogation:  TXP LVAD INTERROGATIONS 11/29/2017 11/29/2017 11/29/2017 11/29/2017 11/22/2017 11/22/2017 11/22/2017   Type HeartMate3 HeartMate3 HeartMate3 HeartMate3 HeartMate3 HeartMate3 HeartMate3   Flow 3.9 4.2 4.0 4.0 4.2 4.3 4.2   Speed 5200 5200 5200 5200 5200 5200 5200   PI 3.9 3.6 3.8 3.6 3.5 3.5 3.3   Power ([place]) 3.6 3.5 3.5 3.6 3.6 3.7 3.6   LSL - - 5000 5000 - 4800 4800   Pulsatility - - Pulse Pulse - Intermittent pulse Intermittent pulse   Some recent data might be hidden   }

## 2017-11-29 NOTE — PROGRESS NOTES
Admit Note     Met with patient and spouse to assess needs. Patient is a 67 y.o.  male, admitted for difibrillator discharge four times (per spouse) .  Pt is also s/p LVAD 7/17.  Pt's spouse does the dressing changes.      Patient admitted from home on 11/29/2017 .  At this time, patient presents as alert and oriented x 4 and good eye contact.  At this time, patients caregiver presents as alert and oriented x 4, pleasant and good eye contact.    Household/Family Systems     Patient resides with patient's spouse and daughter, at     [address]  [facility] [facility] [address].      The pt's home address is:  [address] [place] [id number]  However due to status of home and need for care the pt and spouse stay with their daughter for support.     Support system includes spouse, adult children, extended family and many friends.    Patient does not have dependents that are need of being cared for.     Patients primary caregiver is Kia Hayden, patients spouse and Rayna pt's daughter.   PT's home phone: [phone]  (not currently working)  Pt's cell:  [phone]  Kia Hayden (spouse) [phone]  Rayna (44 yr old daughter) [phone]  Additional emergency contacts:  Boni Hayden (son, lives with pt and works at [employer]) [phone]  Artie Hayden (nephew, lives in [place]) [phone]    During admission, patient's caregiver plans to stay in patient's room.  Confirmed patient and patients caregivers do have access to reliable transportation.    Cognitive Status/Learning     Patient reports reading ability as 9th grade and states patient does not have difficulty with seeing, hearing, comprehension, learning and memory. The pt does have difficulty with reading and writing. The pt's spouse assists with reading and writing.   Patient reports patient learns best by  One on one.     Needed: No.   Highest education level: High School (9-12) or GED    Vocation/Disability    .  Working for Income: No  If no, reason not working: Patient Choice - Retired    Patient is retired from the [employer] in .  The pt was a .  The pt's spouse was working as a PCA until 2017, however she now cares for spouse.     Adherence     Patient reports a high level of adherence to patients health care regimen.  Adherence counseling and education provided. Patient verbalizes understanding.    Substance Use    Patient reports the following substance usage.    Tobacco: none, patient denies any use.  Alcohol: none, patient denies any use.  Illicit Drugs/Non-prescribed Medications: none, patient denies any use.  Patient states clear understanding of the potential impact of substance use.  Substance abstinence/cessation counseling, education and resources provided and reviewed.     Services Utilizing/ADLS    Infusion Service: Prior to admission, patient utilizing? no pt has received home  from [facility] in the past.   Home Health: Prior to admission, patient utilizing? yes [facility]  [phone].  Pt and spouse would like  with same company to be resumed at discharged.  DME: Prior to admission, yes walker and wheel chair  Pulmonary/Cardiac Rehab: Prior to admission, no  Dialysis:  Prior to admission, no  Transplant Specialty Pharmacy:  Prior to admission, no.    Prior to admission, patient reports patient was mostly independent with ADLS and was not driving. Pt's spouse drives.   Patient reports patient is not able to care for self at this time due to compromised medical condition (as documented in medical record) and physical weakness..  Patient indicates a willingness to care for self once medically cleared to do so.    Insurance/Medications    Insured by   Payor/Plan Subscr  Sex Relation Sub. Ins. ID Effective Group Num   1. MEDICARE - ME* MIRTA LOPEZ 1950 Male  [id number] 6/1/15                                    [address]   2. [facility]  BL* MIRTA LOPEZ 1950 Male  [id number] 1/1/10 [id number]                                   [address] [address]      Primary Insurance (for UNOS reporting): Public Insurance - Medicare FFS (Fee For Service)  Secondary Insurance (for UNOS reporting): Private Insurance    Patient reports patient is able to obtain and afford medications at this time and at time of discharge.    Living Will/Healthcare Power of     Patient states patient does not have a LW and/or HCPA.   provided education regarding LW and HCPA and the completion of forms.    Coping/Mental Health    Patient is coping adequately with the aid of  family members, friends and kayli. .  Patient denies mental health difficulties. Worker provided general support.   Pt's spouse reports due to emergency admission meal assistance is needed.  Transplant  will assist with same.     Discharge Planning    At time of discharge, patient plans to return to patient's home under the care of spouse and daughter.  Patients spouse will transport patient.  Per rounds today, expected discharge date has not been medically determined at this time. Patient and patients caregiver  verbalize understanding and are involved in treatment planning and discharge process.    Additional Concerns    Patient is being followed for needs, education, resources, information, emotional support, supportive counseling, and for supportive and skilled discharge plan of care.  providing ongoing psychosocial support, education, resources and d/c planning as needed.  SW remains available. Patient's caregiver verbalizes understanding and agreement with information reviewed,  availability and how to access available resources as needed. Patient verbalizes understanding and agreement with information reviewed, social work availability, and how to access available resources as needed.

## 2017-11-29 NOTE — HOSPITAL COURSE
MDT interrogated confirmed that VT detection off, reviewed EGMs, no Vt, there is Atrial Tachycardia with rates 150's with burst and ramp therapies initiated, thereafter shocks delivered x 4, with resolution to Sinus after shock.

## 2017-11-29 NOTE — HPI
Mr. Suman Rob is a 68 yo male with a PMHx of NICM (EF 10%) s/p HeartMate 3 Implanted 7/27/2017 as DT, EP consulted for ICD shocks.  Recent ICD revision (for high pacing threshold and poor sensing) 11/20/17 with Dr. Vidal (DC ICD placed with active RA/LV leads. RV lead capped during revision) that was complicated by pocket hematoma. He was discharged on 11/22/17 with plan to follow up in the [facility] clinic in a week but hasn't had follow up yet given the recent thanksgiving holiday. He presented to the  ED today after experiencing 4 shocks this AM while bearing down trying to have a bowel movement. He did not syncopize or have LOC. States he doesn't remember having palpitations immediately before or after event and has not had any additional shocks since the morning of 11/28. At ED his K was 4.0, Mg 2.1, INR 1.9,  (at baseline) and his CXR did not show any signs of lead migration.    Presented to [facility] and device was interrogated and reprogrammed to VT detection off.

## 2017-11-29 NOTE — NURSING
Pt admitted to  via EMSx2, emergency bag present. Wife present at bedside with pt belongings. Notified Dr. Hayden of pt's arrival, stated would come to bedside. Assisted pt to bed, bed locked in lowest position. Oriented pt, wife to room, call light, addressed all questions. Cardiac monitoring applied. LVAD switched from battery to wall, speed set at 5200. Pt denies pain, CP, SOB at this time. VSS, see flowsheet. Will continue to monitor.

## 2017-11-29 NOTE — ASSESSMENT & PLAN NOTE
Await input from EP regarding wound care   Will treat pain with hydrocodone-acetaminophen 5/325 Q6PRN

## 2017-11-29 NOTE — ASSESSMENT & PLAN NOTE
66 y/o M with HMIII in 07/2017, here with 4 ICD shocks that were inappropriate. Atrial tachycardia with rates that were in the VT zone. Device reprogrammed to VT detection OFF.  Atrial tachycardia is sporadic therefore no antiarrythmic therapy for now. Additionally the site hematoma appears smaller as compared to previous exam.    -Medtronic DC ICD reprogrammed

## 2017-11-29 NOTE — SUBJECTIVE & OBJECTIVE
Past Medical History:   Diagnosis Date    Cardiomyopathy     Hyperlipidemia     Hypertension     Obesity     S/P implantation of automatic cardioverter/defibrillator (AICD)     Ventricular tachycardia        Past Surgical History:   Procedure Laterality Date    CARDIAC CATHETERIZATION      CARDIAC DEFIBRILLATOR PLACEMENT      LEFT VENTRICULAR ASSIST DEVICE  07/27/2017       Review of patient's allergies indicates:  No Known Allergies    Current Facility-Administered Medications on File Prior to Encounter   Medication    [COMPLETED] ondansetron disintegrating tablet 4 mg    [COMPLETED] potassium chloride SA CR tablet 40 mEq    [DISCONTINUED] hydrALAZINE tablet 50 mg    [DISCONTINUED] pravastatin tablet 20 mg    [DISCONTINUED] warfarin (COUMADIN) tablet 2 mg    [DISCONTINUED] warfarin (COUMADIN) tablet 2 mg    [DISCONTINUED] warfarin (COUMADIN) tablet 2 mg    [DISCONTINUED] warfarin (COUMADIN) tablet 3 mg     Current Outpatient Prescriptions on File Prior to Encounter   Medication Sig    amLODIPine (NORVASC) 10 MG tablet Take 1 tablet (10 mg total) by mouth once daily.    aspirin 81 MG Chew Take 81 mg by mouth once daily.    dronabinol (MARINOL) 5 MG capsule Take 1 capsule (5 mg total) by mouth 2 (two) times daily before meals.    ferrous sulfate 324 mg (65 mg iron) TbEC Take 1 tablet (324 mg total) by mouth once daily. Patient does not tolerate (nausea/vomitting) hold until follow-up and can try to reintroduce.    furosemide (LASIX) 80 MG tablet Take 1 tablet (80 mg total) by mouth 2 (two) times daily.    hydrALAZINE (APRESOLINE) 50 MG tablet Take 1 tablet (50 mg total) by mouth every 8 (eight) hours.    hydrocodone-acetaminophen 5-325mg (NORCO) 5-325 mg per tablet Take 1 tablet by mouth every 6 (six) hours as needed (Acute pain, Z95.811 left ventricular assist device placement).    magnesium oxide (MAG-OX) 400 mg tablet Take 1 tablet (400 mg total) by mouth 2 (two) times daily.     ondansetron (ZOFRAN-ODT) 4 MG TbDL Take 1 tablet (4 mg total) by mouth every 8 (eight) hours as needed.    pantoprazole (PROTONIX) 40 MG tablet Take 1 tablet (40 mg total) by mouth once daily.    polyethylene glycol (GLYCOLAX) 17 gram/dose powder As needed    potassium chloride SA (K-DUR,KLOR-CON) 20 MEQ tablet Take 2 tablets (40 mEq total) by mouth 2 (two) times daily.    pravastatin (PRAVACHOL) 20 MG tablet Take 1 tablet (20 mg total) by mouth every evening.    warfarin (COUMADIN) 2 MG tablet Take 1mg on 11/22/17 then take 3mg by mouth on Tuesdays and 2mg the other days of the week. Or as directed by Coumadin Clinic.     Family History     Problem Relation (Age of Onset)    Heart disease Mother, Father        Social History Main Topics    Smoking status: Never Smoker    Smokeless tobacco: Never Used    Alcohol use No    Drug use: Unknown    Sexual activity: Not on file     Review of Systems   All other systems reviewed and are negative.    Objective:     Vital Signs (Most Recent):  Temp: 98.2 °F (36.8 °C) (11/29/17 1122)  Pulse: 83 (11/29/17 1122)  Resp: 18 (11/29/17 1122)  BP: (!) 107/55 (11/29/17 1122)  SpO2: 98 % (11/29/17 1122) Vital Signs (24h Range):  Temp:  [97.5 °F (36.4 °C)-99.4 °F (37.4 °C)] 98.2 °F (36.8 °C)  Pulse:  [78-93] 83  Resp:  [15-21] 18  SpO2:  [97 %-100 %] 98 %  BP: ()/(0-77) 107/55     Weight: 64.5 kg (142 lb 3.2 oz)  Body mass index is 21.62 kg/m².    SpO2: 98 %  O2 Device (Oxygen Therapy): room air    Physical Exam   Constitutional: He is oriented to person, place, and time. He appears well-developed and well-nourished. No distress.   Cardiovascular: Normal rate and normal heart sounds.    VAD hum  Hematoma noted but appears smaller compared with last exam on discharge.   Pulmonary/Chest: Effort normal and breath sounds normal. No respiratory distress. He has no rales.   Abdominal: Soft.   Musculoskeletal: He exhibits no edema.   Neurological: He is alert and oriented to  person, place, and time.   Skin: Skin is warm.       Significant Labs: All pertinent lab results from the last 24 hours have been reviewed.    Significant Imaging: Device interrogation as above  EKG V-paced rhythm

## 2017-11-29 NOTE — ASSESSMENT & PLAN NOTE
EP consulted.   Will arrange for ICD interrogation.   Will aggressively replete lytes (K>4 and Mg>2)  Monitor on telemetry.   Will keep NPO until input from EP

## 2017-11-29 NOTE — ED NOTES
Gave patient and family update on transfer. Patient is requesting evening medications. MD notified.

## 2017-11-29 NOTE — CONSULTS
[facility]  Cardiac Electrophysiology  Consult Note    Admission Date: 11/29/2017  Code Status: Full Code   Attending Provider: Deejay Duff Jr.,*  Consulting Provider: Gabbi Barajas MD  Principal Problem:Defibrillator discharge    Inpatient consult to Electrophysiology  Consult performed by: GABBI BARAJAS  Consult ordered by: SARA LOPEZ        Subjective:     Chief Complaint: ICD shock     HPI:   Mr. Suman Rob is a 66 yo male with a PMHx of NICM (EF 10%) s/p HeartMate 3 Implanted 7/27/2017 as DT, EP consulted for ICD shocks.  Recent ICD revision (for high pacing threshold and poor sensing) 11/20/17 with Dr. Vidal (DC ICD placed with active RA/LV leads. RV lead capped during revision) that was complicated by pocket hematoma. He was discharged on 11/22/17 with plan to follow up in the [place] EP clinic in a week but hasn't had follow up yet given the recent thanksgiving holiday. He presented to the  ED today after experiencing 4 shocks this AM while bearing down trying to have a bowel movement. He did not syncopize or have LOC. States he doesn't remember having palpitations immediately before or after event and has not had any additional shocks since the morning of 11/28. At ED his K was 4.0, Mg 2.1, INR 1.9,  (at baseline) and his CXR did not show any signs of lead migration.    Presented to [facility] and device was interrogated and reprogrammed to VT detection off.    Past Medical History:   Diagnosis Date    Cardiomyopathy     Hyperlipidemia     Hypertension     Obesity     S/P implantation of automatic cardioverter/defibrillator (AICD)     Ventricular tachycardia        Past Surgical History:   Procedure Laterality Date    CARDIAC CATHETERIZATION      CARDIAC DEFIBRILLATOR PLACEMENT      LEFT VENTRICULAR ASSIST DEVICE  07/27/2017       Review of patient's allergies indicates:  No Known Allergies    Current Facility-Administered  Medications on File Prior to Encounter   Medication    [COMPLETED] ondansetron disintegrating tablet 4 mg    [COMPLETED] potassium chloride SA CR tablet 40 mEq    [DISCONTINUED] hydrALAZINE tablet 50 mg    [DISCONTINUED] pravastatin tablet 20 mg    [DISCONTINUED] warfarin (COUMADIN) tablet 2 mg    [DISCONTINUED] warfarin (COUMADIN) tablet 2 mg    [DISCONTINUED] warfarin (COUMADIN) tablet 2 mg    [DISCONTINUED] warfarin (COUMADIN) tablet 3 mg     Current Outpatient Prescriptions on File Prior to Encounter   Medication Sig    amLODIPine (NORVASC) 10 MG tablet Take 1 tablet (10 mg total) by mouth once daily.    aspirin 81 MG Chew Take 81 mg by mouth once daily.    dronabinol (MARINOL) 5 MG capsule Take 1 capsule (5 mg total) by mouth 2 (two) times daily before meals.    ferrous sulfate 324 mg (65 mg iron) TbEC Take 1 tablet (324 mg total) by mouth once daily. Patient does not tolerate (nausea/vomitting) hold until follow-up and can try to reintroduce.    furosemide (LASIX) 80 MG tablet Take 1 tablet (80 mg total) by mouth 2 (two) times daily.    hydrALAZINE (APRESOLINE) 50 MG tablet Take 1 tablet (50 mg total) by mouth every 8 (eight) hours.    hydrocodone-acetaminophen 5-325mg (NORCO) 5-325 mg per tablet Take 1 tablet by mouth every 6 (six) hours as needed (Acute pain, Z95.811 left ventricular assist device placement).    magnesium oxide (MAG-OX) 400 mg tablet Take 1 tablet (400 mg total) by mouth 2 (two) times daily.    ondansetron (ZOFRAN-ODT) 4 MG TbDL Take 1 tablet (4 mg total) by mouth every 8 (eight) hours as needed.    pantoprazole (PROTONIX) 40 MG tablet Take 1 tablet (40 mg total) by mouth once daily.    polyethylene glycol (GLYCOLAX) 17 gram/dose powder As needed    potassium chloride SA (K-DUR,KLOR-CON) 20 MEQ tablet Take 2 tablets (40 mEq total) by mouth 2 (two) times daily.    pravastatin (PRAVACHOL) 20 MG tablet Take 1 tablet (20 mg total) by mouth every evening.    warfarin  (COUMADIN) 2 MG tablet Take 1mg on 11/22/17 then take 3mg by mouth on Tuesdays and 2mg the other days of the week. Or as directed by Coumadin Clinic.     Family History     Problem Relation (Age of Onset)    Heart disease Mother, Father        Social History Main Topics    Smoking status: Never Smoker    Smokeless tobacco: Never Used    Alcohol use No    Drug use: Unknown    Sexual activity: Not on file     Review of Systems   All other systems reviewed and are negative.    Objective:     Vital Signs (Most Recent):  Temp: 98.2 °F (36.8 °C) (11/29/17 1122)  Pulse: 83 (11/29/17 1122)  Resp: 18 (11/29/17 1122)  BP: (!) 107/55 (11/29/17 1122)  SpO2: 98 % (11/29/17 1122) Vital Signs (24h Range):  Temp:  [97.5 °F (36.4 °C)-99.4 °F (37.4 °C)] 98.2 °F (36.8 °C)  Pulse:  [78-93] 83  Resp:  [15-21] 18  SpO2:  [97 %-100 %] 98 %  BP: ()/(0-77) 107/55     Weight: 64.5 kg (142 lb 3.2 oz)  Body mass index is 21.62 kg/m².    SpO2: 98 %  O2 Device (Oxygen Therapy): room air    Physical Exam   Constitutional: He is oriented to person, place, and time. He appears well-developed and well-nourished. No distress.   Cardiovascular: Normal rate and normal heart sounds.    VAD hum  Hematoma noted but appears smaller compared with last exam on discharge.   Pulmonary/Chest: Effort normal and breath sounds normal. No respiratory distress. He has no rales.   Abdominal: Soft.   Musculoskeletal: He exhibits no edema.   Neurological: He is alert and oriented to person, place, and time.   Skin: Skin is warm.       Significant Labs: All pertinent lab results from the last 24 hours have been reviewed.    Significant Imaging: Device interrogation as above  EKG V-paced rhythm    Assessment and Plan:     * Defibrillator discharge    68 y/o M with HMIII in 07/2017, here with 4 ICD shocks that were inappropriate. Atrial tachycardia with rates that were in the VT zone. Device reprogrammed to VT detection OFF.  Atrial tachycardia is sporadic  therefore no antiarrythmic therapy for now. Additionally the site hematoma appears smaller as compared to previous exam.    -Medtronic DC ICD reprogrammed               Thank you for your consult.   Gabbi Barajas MD  Cardiac Electrophysiology  [facility]
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
## 2017-11-29 NOTE — ED NOTES
Spoke to Marbella with transfer center. Patient to be transferred to [facility] room 326-A. Call report at [phone]

## 2017-11-29 NOTE — HPI
66 y/o male with a PMHx of NICM (EF 10%) s/p HeartMate 3 Implanted 7/27/2017 as DT, HTN, DLD and recent ICD revision on 11/20/17 with Dr. Vidal (DC ICD placed with active RA/LV leads. RV lead capped during revision) that was complicated by pocket hematoma. He was discharged on 11/22/17 with plan to follow up in the [place] EP clinic in a week but had not had follow up yet given the recent thanksgiving holiday, admitted as a transfer after presenting to [place] with ICD shocks.

## 2017-11-29 NOTE — SUBJECTIVE & OBJECTIVE
Past Medical History:   Diagnosis Date    Cardiomyopathy     Hyperlipidemia     Hypertension     Obesity     S/P implantation of automatic cardioverter/defibrillator (AICD)     Ventricular tachycardia        Past Surgical History:   Procedure Laterality Date    CARDIAC CATHETERIZATION      CARDIAC DEFIBRILLATOR PLACEMENT      LEFT VENTRICULAR ASSIST DEVICE  07/27/2017       Review of patient's allergies indicates:  No Known Allergies    Current Facility-Administered Medications   Medication    amLODIPine tablet 10 mg    aspirin chewable tablet 81 mg    furosemide tablet 80 mg    hydrALAZINE tablet 50 mg    hydrocodone-acetaminophen 5-325mg per tablet 1 tablet    magnesium oxide tablet 400 mg    ondansetron disintegrating tablet 4 mg    pantoprazole EC tablet 40 mg    potassium chloride SA CR tablet 40 mEq    pravastatin tablet 20 mg    warfarin (COUMADIN) tablet 2 mg     Family History     Problem Relation (Age of Onset)    Heart disease Mother, Father        Social History Main Topics    Smoking status: Never Smoker    Smokeless tobacco: Never Used    Alcohol use No    Drug use: Unknown    Sexual activity: Not on file     Review of Systems   Constitutional: Negative for diaphoresis, fatigue and fever.   HENT: Negative.    Eyes: Negative.    Respiratory: Negative for shortness of breath.    Cardiovascular: Negative for chest pain and palpitations.   Gastrointestinal: Negative.  Negative for abdominal distention and abdominal pain.   Endocrine: Negative.    Musculoskeletal: Negative.    Allergic/Immunologic: Negative.    Neurological: Negative for seizures, syncope and light-headedness.   Psychiatric/Behavioral: Negative.      Objective:     Vital Signs (Most Recent):  Temp: 99.4 °F (37.4 °C) (11/29/17 0229)  Pulse: 80 (11/29/17 0300)  Resp: 18 (11/29/17 0229)  BP: (!) 82/0 (11/29/17 0230)  SpO2: 97 % (11/29/17 0229) Vital Signs (24h Range):  Temp:  [98.3 °F (36.8 °C)-99.4 °F (37.4 °C)] 99.4  °F (37.4 °C)  Pulse:  [78-93] 80  Resp:  [15-21] 18  SpO2:  [97 %-100 %] 97 %  BP: (78-96)/(0-77) 82/0     Physical Exam   Constitutional: He is oriented to person, place, and time. He appears well-developed and well-nourished.   HENT:   Head: Normocephalic and atraumatic.   Eyes: Conjunctivae are normal. Pupils are equal, round, and reactive to light.   Neck: Neck supple. JVD present.   Cardiovascular: Normal rate.    Smooth LVAD HUM   Strong distal pulses    Pulmonary/Chest: Effort normal and breath sounds normal. He has no wheezes. He has no rales.   Abdominal: Soft.   Musculoskeletal: He exhibits edema.   Neurological: He is alert and oriented to person, place, and time.   Skin: Skin is warm and dry. Capillary refill takes 2 to 3 seconds.   Psychiatric: He has a normal mood and affect. His behavior is normal. Judgment and thought content normal.       Significant Labs:  CBC:    Recent Labs  Lab 11/22/17 0426 11/28/17  1147   WBC 6.38 8.15   RBC 3.37* 3.40*   HGB 8.4* 8.5*   HCT 26.4* 26.6*    310   MCV 78* 78*   MCH 24.9* 25.0*   MCHC 31.8* 32.0     BNP:    Recent Labs  Lab 11/28/17  1147   *     CMP:    Recent Labs  Lab 11/22/17 0426 11/28/17  1147   GLU 97 108   CALCIUM 9.0 9.7   ALBUMIN  --  3.1*   PROT  --  7.4    140   K 3.8 4.0   CO2 26 27    101   BUN 14 15   CREATININE 1.1 1.0   ALKPHOS  --  74   ALT  --  9*   AST  --  20   BILITOT  --  0.5      Coagulation:     Recent Labs  Lab 11/22/17 0426 11/27/17 11/28/17  1147   INR 2.1* 1.8 1.9*   APTT  --   --  32.5*     I have reviewed all pertinent labs within the past 24 hours.    Diagnostic Results:  CXR: X-ray Chest 1 View: No adverse interval change

## 2017-11-29 NOTE — ASSESSMENT & PLAN NOTE
Daily E and M and VAD Interrogation Note    Reason for Visit: ICD fire at home   Patient is seen in follow up for management of:  [x] HeartMate III      Interval History:  The [x] implant   Events overnight      Review of Systems:  All  systems reviewed and negative    Medications:  Current Facility-Administered Medications   Medication Dose Route Frequency Provider Last Rate Last Dose    amLODIPine tablet 10 mg  10 mg Oral Daily Giovanni Hayden DO   10 mg at 11/29/17 0802    aspirin chewable tablet 81 mg  81 mg Oral Daily Giovanni Hayden DO   81 mg at 11/29/17 0801    furosemide tablet 80 mg  80 mg Oral BID Giovanni Hayden DO   80 mg at 11/29/17 0802    glycerin adult suppository 1 suppository  1 suppository Rectal Once Susannah Elizabeth PA-C        heparin 25,000 units in dextrose 5% 250 mL (100 units/mL) infusion (heparin infusion)  800 Units/hr Intravenous Continuous Susannah Elizabeth PA-C 8 mL/hr at 11/29/17 1323 800 Units/hr at 11/29/17 1323    hydrALAZINE tablet 50 mg  50 mg Oral Q8H Giovanni Hayden, DO   50 mg at 11/29/17 1323    hydrocodone-acetaminophen 5-325mg per tablet 1 tablet  1 tablet Oral Q6H PRN Giovanni Hayden DO        magnesium oxide tablet 400 mg  400 mg Oral BID Giovanni Hayden, DO   400 mg at 11/29/17 0802    ondansetron disintegrating tablet 4 mg  4 mg Oral Q8H PRN Giovanni Hayden DO        pantoprazole EC tablet 40 mg  40 mg Oral Daily Giovanni Hayden, DO   40 mg at 11/29/17 0802    potassium chloride SA CR tablet 40 mEq  40 mEq Oral BID Giovanni Hayden, DO   40 mEq at 11/29/17 0802    pravastatin tablet 20 mg  20 mg Oral QHS Giovanni Hayden, DO   20 mg at 11/29/17 0607    [START ON 11/30/2017] warfarin (COUMADIN) tablet 2 mg  2 mg Oral Daily Susannah Elizabeth PA-C        warfarin (COUMADIN) tablet 5 mg  5 mg Oral Once Susannah Elizabeth PA-C           Physical Examination:  Vital Signs:   Vitals:    11/29/17 1122   BP: (!) 107/55   Pulse: 83   Resp: 18   Temp:  98.2 °F (36.8 °C)     Cardiovascular:  [x] Regular rate and rhythm. VAD sounds smooth   [x]  No edema []  Edema present  [x]  Clear to auscultation  []  Rales to []  Coarse  []  No rales but   [] Pedal Pulses absent  []  Pulses + throughout  Skin:  Incision is [x]  Clean, dry and intact.    Sternum:  [x]  Stable []  Unstable  Driveline(s):   [x]  Clean, dry and intact.       Labs:  Reviewed     X-Rays:  [x]  I reviewed  Chest x-ray    Procedure:  Device Interrogation including analysis of device parameters.  Current Settings   [x]  Ventricular Assist Device  []  Total Artificial Heart interrogated  Review of device function is [x]  Stable     TXP LVAD INTERROGATIONS 11/29/2017 11/29/2017 11/29/2017 11/29/2017 11/22/2017 11/22/2017 11/22/2017   Type HeartMate3 HeartMate3 HeartMate3 HeartMate3 HeartMate3 HeartMate3 HeartMate3   Flow 3.9 4.2 4.0 4.0 4.2 4.3 4.2   Speed 5200 5200 5200 5200 5200 5200 5200   PI 3.9 3.6 3.8 3.6 3.5 3.5 3.3   Power ([place]) 3.6 3.5 3.5 3.6 3.6 3.7 3.6   LSL - - 5000 5000 - 4800 4800   Pulsatility - - Pulse Pulse - Intermittent pulse Intermittent pulse   Some recent data might be hidden       Assessment:  [x]  Primary Cardiomyopathy []  Congestive Heart Failure   []  Atrial Fibrillation [x]  Ventricular Tachycardia   []  Aftercare cardiac device [x]  Long term (current) use of anticoagulants   []  Ventilator-associated pneumonia []  Pneumonia viral, unspecified   []  Pneumonia, bacterial, unspecified []  Pneumonia, organism unspecified   []  Hemorrhage of GI tract, unspecified    []  Nosebleed []  Driveline infection   []  Infection VAD device          Plan:  [x]  Interval history obtained from HTS attending team member during rounding today  []  VAD/MATT teaching performed with patient  [x]  Mobilization / Physical Therapy ongoing  [x]  Anticoagulation [x]  Ongoing []  Held  -EP following  -4 ICD shocks that were inappropriate, Atrial tachycardia       Total time spent was 38 minutes.  Of  which more than 50 percent of the care dominated counseling and coordinating care with different team members. The VAD was interrogated and all parameters were WNL and no significant findings were found in the history. All these findings are documented in the note above.      Date of Service: 11/29/2017

## 2017-11-29 NOTE — NURSING
Hematoma and swelling noted on admit to pt's LCW incision from lead revision to AICD performed on 11/20. Skin is firm, warm on palpation, mild pain reported. Pt reports being discharged on 11/22 with hematoma and that EP team was aware of hematoma prior to discharge. Pt reports mild pain around the site at home, relieved by tylenol. Dr. Hayden notified, stated would assess hematoma during admission assessment. No orders given at this time. Will continue to monitor.

## 2017-11-29 NOTE — PLAN OF CARE
Problem: Patient Care Overview  Goal: Plan of Care Review  Outcome: Ongoing (interventions implemented as appropriate)  Pt free of falls, injury this shift. POC reviewed with pt at bedside, verbalized understanding. Pt denies CP, SOB at this time. No arryhthmias or shocks from ICD noted, ICD interrogation ordered for today. EP consult placed, pt to remain NPO until EP reccs. Hematoma noted on admit to LCW incision, pt reports mild pain moderately relieved by tylenol, MD aware. VSS, VAD numbers WNL. All questions addressed. Will continue to monitor.

## 2017-11-29 NOTE — PROGRESS NOTES
Clinical Pharmacy: Coumadin Consult Note    Pharmacy consulted to dose Coumadin by Dr. Gonsales in ER  Indication: LVAD  Goal INR: 2.0-3.0  Today's INR: 1.9    Per Coumadin Clinic note yesterday, home regimen is 3 mg on Tuesdays & 2 mg all other days --> will continue this  Pt will receive 3 mg dose now  Pt is only being held here until he can be transferred to [facility]   Will order daily protime-INR    Thank you for allowing us to participate in this patient's care.   Katherine McArdle, Pharm.D. 11/28/2017 9:06 PM

## 2017-11-29 NOTE — HPI
Mr. Suman Rob is a 66 yo male with a PMHx of NICM (EF 10%) s/p HeartMate 3 Implanted 7/27/2017 as DT, HTN, DLD and recent ICD revision on 11/20/17 with Dr. Vidal (DC ICD placed with active RA/LV leads. RV lead capped during revision) that was complicated by pocket hematoma. He was discharged on 11/22/17 with plan to follow up in the [place] EP clinic in a week but hasn't had follow up yet given the recent thanksgiving holiday. He presented to the  ED today after experiencing 4 shocks this AM while bearing down trying to have a bowel movement. He did not syncopize or have LOC. States he doesn't remember having palpitations immediately before or after event and has not had any additional shocks since the morning of 11/28. At ED his K was 4.0, Mg 2.1, INR 1.9,  (at baseline) and his CXR did not show any signs of lead migration. He was transferred here for further evaluation by EP. Denies recent illnesses or CHF symptoms. Denies dietary / medication non adherence. Of note, has been having some pain at the pocket/hematoma site that he has been taking tylenol with some relief. Denies fevers/chills or rigors.

## 2017-11-29 NOTE — H&P
[facility]  Heart Transplant  H&P    Patient Name: Suman Hayden  MRN: [id number]  Admission Date: 11/29/2017  Attending Physician: Deejay Duff Jr.,*  Primary Care Provider: Joe Ernst MD  Principal Problem:Defibrillator discharge    Subjective:     History of Present Illness:  Mr. Suman Rob is a 68 yo male with a PMHx of NICM (EF 10%) s/p HeartMate 3 Implanted 7/27/2017 as DT, HTN, DLD and recent ICD revision on 11/20/17 with Dr. Vidal (DC ICD placed with active RA/LV leads. RV lead capped during revision) that was complicated by pocket hematoma. He was discharged on 11/22/17 with plan to follow up in the [place] EP clinic in a week but hasn't had follow up yet given the recent thanksgiving holiday. He presented to the  ED today after experiencing 4 shocks this AM while bearing down trying to have a bowel movement. He did not syncopize or have LOC. States he doesn't remember having palpitations immediately before or after event and has not had any additional shocks since the morning of 11/28. At ED his K was 4.0, Mg 2.1, INR 1.9,  (at baseline) and his CXR did not show any signs of lead migration. He was transferred here for further evaluation by EP. Denies recent illnesses or CHF symptoms. Denies dietary / medication non adherence. Of note, has been having some pain at the pocket/hematoma site that he has been taking tylenol with some relief. Denies fevers/chills or rigors.     Past Medical History:   Diagnosis Date    Cardiomyopathy     Hyperlipidemia     Hypertension     Obesity     S/P implantation of automatic cardioverter/defibrillator (AICD)     Ventricular tachycardia        Past Surgical History:   Procedure Laterality Date    CARDIAC CATHETERIZATION      CARDIAC DEFIBRILLATOR PLACEMENT      LEFT VENTRICULAR ASSIST DEVICE  07/27/2017       Review of patient's allergies indicates:  No Known Allergies    Current Facility-Administered Medications    Medication    amLODIPine tablet 10 mg    aspirin chewable tablet 81 mg    furosemide tablet 80 mg    hydrALAZINE tablet 50 mg    hydrocodone-acetaminophen 5-325mg per tablet 1 tablet    magnesium oxide tablet 400 mg    ondansetron disintegrating tablet 4 mg    pantoprazole EC tablet 40 mg    potassium chloride SA CR tablet 40 mEq    pravastatin tablet 20 mg    warfarin (COUMADIN) tablet 2 mg     Family History     Problem Relation (Age of Onset)    Heart disease Mother, Father        Social History Main Topics    Smoking status: Never Smoker    Smokeless tobacco: Never Used    Alcohol use No    Drug use: Unknown    Sexual activity: Not on file     Review of Systems   Constitutional: Negative for diaphoresis, fatigue and fever.   HENT: Negative.    Eyes: Negative.    Respiratory: Negative for shortness of breath.    Cardiovascular: Negative for chest pain and palpitations.   Gastrointestinal: Negative.  Negative for abdominal distention and abdominal pain.   Endocrine: Negative.    Musculoskeletal: Negative.    Allergic/Immunologic: Negative.    Neurological: Negative for seizures, syncope and light-headedness.   Psychiatric/Behavioral: Negative.      Objective:     Vital Signs (Most Recent):  Temp: 99.4 °F (37.4 °C) (11/29/17 0229)  Pulse: 80 (11/29/17 0300)  Resp: 18 (11/29/17 0229)  BP: (!) 82/0 (11/29/17 0230)  SpO2: 97 % (11/29/17 0229) Vital Signs (24h Range):  Temp:  [98.3 °F (36.8 °C)-99.4 °F (37.4 °C)] 99.4 °F (37.4 °C)  Pulse:  [78-93] 80  Resp:  [15-21] 18  SpO2:  [97 %-100 %] 97 %  BP: (78-96)/(0-77) 82/0     Physical Exam   Constitutional: He is oriented to person, place, and time. He appears well-developed and well-nourished.   HENT:   Head: Normocephalic and atraumatic.   Eyes: Conjunctivae are normal. Pupils are equal, round, and reactive to light.   Neck: Neck supple. JVD present.   Cardiovascular: Normal rate. Smooth LVAD HUM. Strong distal pulses.   Pulmonary/Chest: Effort normal  and breath sounds normal. He has no wheezes. He has no rales. R ICD hematoma is soft without signs of infection. Non tender to touch.   Abdominal: Soft.   Musculoskeletal: He exhibits edema.   Neurological: He is alert and oriented to person, place, and time.   Skin: Skin is warm and dry. Capillary refill takes 2 to 3 seconds.   Psychiatric: He has a normal mood and affect. His behavior is normal. Judgment and thought content normal.       Significant Labs:  CBC:    Recent Labs  Lab 11/22/17 0426 11/28/17  1147   WBC 6.38 8.15   RBC 3.37* 3.40*   HGB 8.4* 8.5*   HCT 26.4* 26.6*    310   MCV 78* 78*   MCH 24.9* 25.0*   MCHC 31.8* 32.0     BNP:    Recent Labs  Lab 11/28/17  1147   *     CMP:    Recent Labs  Lab 11/22/17 0426 11/28/17  1147   GLU 97 108   CALCIUM 9.0 9.7   ALBUMIN  --  3.1*   PROT  --  7.4    140   K 3.8 4.0   CO2 26 27    101   BUN 14 15   CREATININE 1.1 1.0   ALKPHOS  --  74   ALT  --  9*   AST  --  20   BILITOT  --  0.5      Coagulation:     Recent Labs  Lab 11/22/17 0426 11/27/17 11/28/17  1147   INR 2.1* 1.8 1.9*   APTT  --   --  32.5*     I have reviewed all pertinent labs within the past 24 hours.    Diagnostic Results:  CXR: X-ray Chest 1 View: No adverse interval change    Assessment/Plan:     * Defibrillator discharge    EP consulted.   Will arrange for ICD interrogation.   Will aggressively replete lytes (K>4 and Mg>2)  Monitor on telemetry.   Will keep NPO until input from EP         LVAD (left ventricular assist device) present    HM3 implanted 7/2017 as DT  Pulsatile on exam   INR subtherapeutic at 1.9   INR goal of 2-3  Will await repeat INR this AM and initiate heparin gtt if <2   Continue Coumadin at 2mg daily except for 3mg on tuesdays   LDH pending this AM  Continue amlodipine 10mg daily, hydralazine 50mg TID and lasix 80mg BID  MAP goal of 60-80        ICD (implantable cardioverter-defibrillator) pocket hematoma    Await input from EP regarding wound  care   Will treat pain with hydrocodone-acetaminophen 5/325 Q6PRN             Giovanni serrano, DO  Heart Transplant  [facility]

## 2017-11-29 NOTE — PLAN OF CARE
Problem: Patient Care Overview  Goal: Plan of Care Review  Outcome: Ongoing (interventions implemented as appropriate)  Pt verbalizes no complaints. Denies CP, SOB, or other discomforts. Heparin drip infusing as ordered. Pt INR 1.8 this shift. H/H trending down but stable. Occult stool not able to be collected yet; as all samples have been contaminated with urine. VAD numbers and dopplers WNL.  Pt remains free of fall or injury. Pt verbalizes understanding of plan of care. Will continue to monitor.

## 2017-11-30 DIAGNOSIS — Z95.810 ICD (IMPLANTABLE CARDIOVERTER-DEFIBRILLATOR) IN PLACE: Primary | ICD-10-CM

## 2017-11-30 DIAGNOSIS — I42.8 NONISCHEMIC CARDIOMYOPATHY: ICD-10-CM

## 2017-11-30 DIAGNOSIS — I47.20 V-TACH: ICD-10-CM

## 2017-11-30 DIAGNOSIS — I50.22 CHRONIC SYSTOLIC CONGESTIVE HEART FAILURE: ICD-10-CM

## 2017-11-30 PROBLEM — K59.00 CONSTIPATION: Status: ACTIVE | Noted: 2017-11-30

## 2017-11-30 PROBLEM — T82.837A ICD (IMPLANTABLE CARDIOVERTER-DEFIBRILLATOR) POCKET HEMATOMA: Status: ACTIVE | Noted: 2017-11-30

## 2017-11-30 PROBLEM — R79.1 SUBTHERAPEUTIC INTERNATIONAL NORMALIZED RATIO (INR): Status: ACTIVE | Noted: 2017-11-30

## 2017-11-30 LAB
ALBUMIN SERPL BCP-MCNC: 2.8 G/DL
ALP SERPL-CCNC: 66 U/L
ALT SERPL W/O P-5'-P-CCNC: 8 U/L
ANION GAP SERPL CALC-SCNC: 12 MMOL/L
AST SERPL-CCNC: 19 U/L
BASOPHILS # BLD AUTO: 0.05 K/UL
BASOPHILS NFR BLD: 0.7 %
BILIRUB SERPL-MCNC: 0.6 MG/DL
BUN SERPL-MCNC: 16 MG/DL
CALCIUM SERPL-MCNC: 9 MG/DL
CHLORIDE SERPL-SCNC: 101 MMOL/L
CO2 SERPL-SCNC: 25 MMOL/L
CREAT SERPL-MCNC: 1 MG/DL
DIFFERENTIAL METHOD: ABNORMAL
EOSINOPHIL # BLD AUTO: 0.5 K/UL
EOSINOPHIL NFR BLD: 6.3 %
ERYTHROCYTE [DISTWIDTH] IN BLOOD BY AUTOMATED COUNT: 16.6 %
EST. GFR  (AFRICAN AMERICAN): >60 ML/MIN/1.73 M^2
EST. GFR  (NON AFRICAN AMERICAN): >60 ML/MIN/1.73 M^2
FACT X PPP CHRO-ACNC: 0.2 IU/ML
FACT X PPP CHRO-ACNC: 0.3 IU/ML
FACT X PPP CHRO-ACNC: 0.31 IU/ML
FACT X PPP CHRO-ACNC: 0.34 IU/ML
FERRITIN SERPL-MCNC: 179 NG/ML
GLUCOSE SERPL-MCNC: 93 MG/DL
HCT VFR BLD AUTO: 24.2 %
HGB BLD-MCNC: 7.8 G/DL
IMM GRANULOCYTES # BLD AUTO: 0.03 K/UL
IMM GRANULOCYTES NFR BLD AUTO: 0.4 %
INR PPP: 1.8
IRON SERPL-MCNC: 23 UG/DL
LDH SERPL L TO P-CCNC: 180 U/L
LYMPHOCYTES # BLD AUTO: 1.5 K/UL
LYMPHOCYTES NFR BLD: 20.4 %
MAGNESIUM SERPL-MCNC: 2.1 MG/DL
MCH RBC QN AUTO: 25.2 PG
MCHC RBC AUTO-ENTMCNC: 32.2 G/DL
MCV RBC AUTO: 78 FL
MONOCYTES # BLD AUTO: 0.8 K/UL
MONOCYTES NFR BLD: 10.2 %
NEUTROPHILS # BLD AUTO: 4.6 K/UL
NEUTROPHILS NFR BLD: 62 %
NRBC BLD-RTO: 0 /100 WBC
PLATELET # BLD AUTO: 329 K/UL
PMV BLD AUTO: 10.9 FL
POTASSIUM SERPL-SCNC: 3.9 MMOL/L
PROT SERPL-MCNC: 6.8 G/DL
PROTHROMBIN TIME: 18.5 SEC
RBC # BLD AUTO: 3.1 M/UL
SATURATED IRON: 8 %
SODIUM SERPL-SCNC: 138 MMOL/L
TOTAL IRON BINDING CAPACITY: 272 UG/DL
TRANSFERRIN SERPL-MCNC: 184 MG/DL
WBC # BLD AUTO: 7.34 K/UL

## 2017-11-30 PROCEDURE — 25000003 PHARM REV CODE 250: Performed by: INTERNAL MEDICINE

## 2017-11-30 PROCEDURE — 85520 HEPARIN ASSAY: CPT

## 2017-11-30 PROCEDURE — 20600001 HC STEP DOWN PRIVATE ROOM

## 2017-11-30 PROCEDURE — 80053 COMPREHEN METABOLIC PANEL: CPT

## 2017-11-30 PROCEDURE — 63600175 PHARM REV CODE 636 W HCPCS: Performed by: PHYSICIAN ASSISTANT

## 2017-11-30 PROCEDURE — 85025 COMPLETE CBC W/AUTO DIFF WBC: CPT

## 2017-11-30 PROCEDURE — 83540 ASSAY OF IRON: CPT

## 2017-11-30 PROCEDURE — 63600175 PHARM REV CODE 636 W HCPCS: Performed by: INTERNAL MEDICINE

## 2017-11-30 PROCEDURE — 93750 INTERROGATION VAD IN PERSON: CPT | Mod: ,,, | Performed by: INTERNAL MEDICINE

## 2017-11-30 PROCEDURE — 93750 INTERROGATION VAD IN PERSON: CPT | Performed by: INTERNAL MEDICINE

## 2017-11-30 PROCEDURE — 36415 COLL VENOUS BLD VENIPUNCTURE: CPT

## 2017-11-30 PROCEDURE — 27000248 HC VAD-ADDITIONAL DAY

## 2017-11-30 PROCEDURE — 83615 LACTATE (LD) (LDH) ENZYME: CPT

## 2017-11-30 PROCEDURE — 99233 SBSQ HOSP IP/OBS HIGH 50: CPT | Mod: ,,, | Performed by: INTERNAL MEDICINE

## 2017-11-30 PROCEDURE — 82728 ASSAY OF FERRITIN: CPT

## 2017-11-30 PROCEDURE — 25000003 PHARM REV CODE 250: Performed by: PHYSICIAN ASSISTANT

## 2017-11-30 PROCEDURE — 83735 ASSAY OF MAGNESIUM: CPT

## 2017-11-30 PROCEDURE — 85610 PROTHROMBIN TIME: CPT

## 2017-11-30 RX ORDER — WARFARIN 3 MG/1
3 TABLET ORAL DAILY
Status: DISCONTINUED | OUTPATIENT
Start: 2017-11-30 | End: 2017-12-01 | Stop reason: HOSPADM

## 2017-11-30 RX ORDER — DRONABINOL 2.5 MG/1
5 CAPSULE ORAL 2 TIMES DAILY
Status: DISCONTINUED | OUTPATIENT
Start: 2017-11-30 | End: 2017-12-01 | Stop reason: HOSPADM

## 2017-11-30 RX ADMIN — HEPARIN SODIUM AND DEXTROSE 1000 UNITS/HR: 10000; 5 INJECTION INTRAVENOUS at 05:11

## 2017-11-30 RX ADMIN — HYDRALAZINE HYDROCHLORIDE 50 MG: 50 TABLET ORAL at 09:11

## 2017-11-30 RX ADMIN — AMLODIPINE BESYLATE 10 MG: 10 TABLET ORAL at 09:11

## 2017-11-30 RX ADMIN — MAGNESIUM OXIDE TAB 400 MG (241.3 MG ELEMENTAL MG) 400 MG: 400 (241.3 MG) TAB at 09:11

## 2017-11-30 RX ADMIN — FUROSEMIDE 80 MG: 80 TABLET ORAL at 09:11

## 2017-11-30 RX ADMIN — ASPIRIN 81 MG CHEWABLE TABLET 81 MG: 81 TABLET CHEWABLE at 09:11

## 2017-11-30 RX ADMIN — WARFARIN SODIUM 3 MG: 3 TABLET ORAL at 04:11

## 2017-11-30 RX ADMIN — HYDRALAZINE HYDROCHLORIDE 50 MG: 50 TABLET ORAL at 05:11

## 2017-11-30 RX ADMIN — POTASSIUM CHLORIDE 40 MEQ: 1500 TABLET, EXTENDED RELEASE ORAL at 09:11

## 2017-11-30 RX ADMIN — PANTOPRAZOLE SODIUM 40 MG: 40 TABLET, DELAYED RELEASE ORAL at 09:11

## 2017-11-30 RX ADMIN — HYDRALAZINE HYDROCHLORIDE 50 MG: 50 TABLET ORAL at 02:11

## 2017-11-30 RX ADMIN — DRONABINOL 5 MG: 2.5 CAPSULE ORAL at 09:11

## 2017-11-30 RX ADMIN — PRAVASTATIN SODIUM 20 MG: 20 TABLET ORAL at 09:11

## 2017-11-30 NOTE — PROCEDURES
Patient AAO x3 with wife at bedside. VAD interrogation completed this AM in the event changes needed to be made. Will continue to monitor for further issues.     Pulsatile: Yes, intermittent   VAD Sounds: HM3  Smooth  Problems / Issues / Alarms with VAD if any: None noted  HCT: 24    VAD Interrogation:  TXP LVAD INTERROGATIONS 11/30/2017 11/30/2017 11/29/2017 11/29/2017 11/29/2017 11/29/2017 11/29/2017   Type HeartMate3 HeartMate3 HeartMate3 HeartMate3 HeartMate3 HeartMate3 HeartMate3   Flow 4.0 4.1 4.2 4.2 3.8 3.9 4.2   Speed 5200 5200 5200 5200 5200 5200 5200   PI 4.0 3.5 3.6 3.5 3.8 3.9 3.6   Power ([place]) 3.6 3.6 3.5 3.6 3.5 3.6 3.5   LSL 4800 4800 4800 4800 - - -   Pulsatility Pulse - - - - - -   Some recent data might be hidden   }

## 2017-11-30 NOTE — PLAN OF CARE
Problem: Patient Care Overview  Goal: Plan of Care Review  Outcome: Ongoing (interventions implemented as appropriate)  Pt remains free from falls and injury. Plan of care reviewed with pt. Pt understands plan. Pt denies pain, VSS. Doppler 78. Pt c/o feeling constipated. Pt currently incontinent of loose bowel. Enema ordered. Will continue to monitor.

## 2017-11-30 NOTE — ASSESSMENT & PLAN NOTE
Daily E and M and VAD Interrogation Note    Reason for Visit: ICD fire at home   Patient is seen in follow up for management of:  [x] HeartMate III      Interval History:  The [x] implant 7/27/17  No Events overnight      Review of Systems:  All  systems reviewed and negative    Medications:  Current Facility-Administered Medications   Medication Dose Route Frequency Provider Last Rate Last Dose    amLODIPine tablet 10 mg  10 mg Oral Daily Giovanni Hayden DO   10 mg at 11/30/17 0918    aspirin chewable tablet 81 mg  81 mg Oral Daily Giovanni Hayden DO   81 mg at 11/30/17 0918    furosemide tablet 80 mg  80 mg Oral BID Giovanni Hayden DO   80 mg at 11/30/17 0918    glycerin adult suppository 1 suppository  1 suppository Rectal Once Susannah Elizabeth PA-C        heparin 25,000 units in dextrose 5% 250 mL (100 units/mL) infusion (heparin infusion)  1,000 Units/hr Intravenous Continuous Giovanni Hayden DO 10 mL/hr at 11/29/17 2158 1,000 Units/hr at 11/29/17 2158    hydrALAZINE tablet 50 mg  50 mg Oral Q8H Giovanni Hayden, DO   50 mg at 11/30/17 0519    hydrocodone-acetaminophen 5-325mg per tablet 1 tablet  1 tablet Oral Q6H PRN Giovanni Hayden, DO        magnesium oxide tablet 400 mg  400 mg Oral BID Giovanni Hayden, DO   400 mg at 11/30/17 0918    ondansetron disintegrating tablet 4 mg  4 mg Oral Q8H PRN Giovanni Hayden, DO        pantoprazole EC tablet 40 mg  40 mg Oral Daily Giovanni Hayden, DO   40 mg at 11/30/17 0918    potassium chloride SA CR tablet 40 mEq  40 mEq Oral BID Giovanni Hayden, DO   40 mEq at 11/30/17 0918    pravastatin tablet 20 mg  20 mg Oral QHS Giovanni Hayden, DO   20 mg at 11/29/17 2150    sodium phosphates 19-7 gram/118 mL enema 1 enema  1 enema Rectal Once Abigail Green PA-C        warfarin (COUMADIN) tablet 2 mg  2 mg Oral Daily Susannah Elizabeth PA-C           Physical Examination:  Vital Signs:   Vitals:    11/30/17 1159   BP: 92/67   Pulse: 80   Resp: 18   Temp: 98.8  °F (37.1 °C)     Cardiovascular:  [x] Regular rate and rhythm. VAD sounds smooth   [x]  No edema []  Edema present  [x]  Clear to auscultation  []  Rales to []  Coarse  []  No rales but   [] Pedal Pulses absent  []  Pulses + throughout  Skin:  Incision is [x]  Clean, dry and intact.    Sternum:  [x]  Stable []  Unstable  Driveline(s):   [x]  Clean, dry and intact.       Labs:  Reviewed     X-Rays:  [x]  I reviewed  Chest x-ray    Procedure:  Device Interrogation including analysis of device parameters.  Current Settings   [x]  Ventricular Assist Device  []  Total Artificial Heart interrogated  Review of device function is [x]  Stable     TXP LVAD INTERROGATIONS 11/30/2017 11/30/2017 11/29/2017 11/29/2017 11/29/2017 11/29/2017 11/29/2017   Type HeartMate3 HeartMate3 HeartMate3 HeartMate3 HeartMate3 HeartMate3 HeartMate3   Flow 4.0 4.1 4.2 4.2 3.8 3.9 4.2   Speed 5200 5200 5200 5200 5200 5200 5200   PI 4.0 3.5 3.6 3.5 3.8 3.9 3.6   Power ([place]) 3.6 3.6 3.5 3.6 3.5 3.6 3.5   LSL 4800 4800 4800 4800 - - -   Pulsatility Pulse - - - - - -   Some recent data might be hidden       Assessment:  [x]  Primary Cardiomyopathy []  Congestive Heart Failure   []  Atrial Fibrillation [x]  Ventricular Tachycardia   []  Aftercare cardiac device [x]  Long term (current) use of anticoagulants   []  Ventilator-associated pneumonia []  Pneumonia viral, unspecified   []  Pneumonia, bacterial, unspecified []  Pneumonia, organism unspecified   []  Hemorrhage of GI tract, unspecified    []  Nosebleed []  Driveline infection   []  Infection VAD device          Plan:  [x]  Interval history obtained from HTS attending team member during rounding today  []  VAD/MATT teaching performed with patient  [x]  Mobilization / Physical Therapy ongoing  [x]  Anticoagulation [x]  Ongoing []  Held  -EP following  -4 ICD shocks that were inappropriate, Atrial tachycardia   - Hematoma to left upper chest has not had an increase in swelling no drainage noted on  gauze      Total time spent was 38 minutes.  Of which more than 50 percent of the care dominated counseling and coordinating care with different team members. The VAD was interrogated and all parameters were WNL and no significant findings were found in the history. All these findings are documented in the note above.      Date of Service: 11/30/2017

## 2017-11-30 NOTE — ASSESSMENT & PLAN NOTE
-HeartMate 3 Implanted 7/27/2017 as DT  -HTS Primary  -Implanted by Dr. Downing  -Continue Coumadin, Goal INR 2.0-3.0. Subtherapeutic today.   -Antiplatelets ASA 81 mg  -LDH is stable overall today. Will continue to monitor daily.  -Speed set at 5200, LSL 4800 rpm  -Interrogation notable for no events  -Not listed for OHTx

## 2017-11-30 NOTE — PROGRESS NOTES
[facility]  Heart Transplant  Progress Note    Patient Name: Suman Hayden  MRN: [id number]  Admission Date: 11/29/2017  Hospital Length of Stay: 1 days  Attending Physician: Deejay Duff Jr.,*  Primary Care Provider: Joe Ernst MD  Principal Problem:Defibrillator discharge    Subjective:     Interval History: constipation reported. Hematoma improving per patient.     Continuous Infusions:   heparin (porcine) in 5 % dex 1,000 Units/hr (11/29/17 2158)     Scheduled Meds:   amLODIPine  10 mg Oral Daily    aspirin  81 mg Oral Daily    furosemide  80 mg Oral BID    glycerin adult  1 suppository Rectal Once    hydrALAZINE  50 mg Oral Q8H    magnesium oxide  400 mg Oral BID    pantoprazole  40 mg Oral Daily    potassium chloride SA  40 mEq Oral BID    pravastatin  20 mg Oral QHS    sodium phosphates  1 enema Rectal Once    warfarin  2 mg Oral Daily     PRN Meds:hydrocodone-acetaminophen 5-325mg, ondansetron    Review of patient's allergies indicates:  No Known Allergies  Objective:     Vital Signs (Most Recent):  Temp: 98.2 °F (36.8 °C) (11/30/17 0900)  Pulse: 80 (11/30/17 0900)  Resp: 18 (11/30/17 0900)  BP: (!) 78/0 (11/30/17 0907)  SpO2: 97 % (11/30/17 0900) Vital Signs (24h Range):  Temp:  [98.1 °F (36.7 °C)-98.8 °F (37.1 °C)] 98.2 °F (36.8 °C)  Pulse:  [78-96] 80  Resp:  [16-18] 18  SpO2:  [94 %-98 %] 97 %  BP: ()/(0-69) 78/0     Patient Vitals for the past 72 hrs (Last 3 readings):   Weight   11/30/17 0700 61.9 kg (136 lb 7.4 oz)   11/29/17 0700 64.5 kg (142 lb 3.2 oz)   11/29/17 0229 62.8 kg (138 lb 7.2 oz)     Body mass index is 20.75 kg/m².      Intake/Output Summary (Last 24 hours) at 11/30/17 1056  Last data filed at 11/30/17 0500   Gross per 24 hour   Intake             1170 ml   Output             1150 ml   Net               20 ml       Hemodynamic Parameters:         Physical Exam   Constitutional: He is oriented to person, place, and time. He appears  well-developed and well-nourished.   HENT:   Head: Normocephalic and atraumatic.   Neck: Normal range of motion. Neck supple. No JVD present.   Cardiovascular: Normal rate and regular rhythm.    vad hum smooth   Pulmonary/Chest: Effort normal and breath sounds normal. No respiratory distress. He has no wheezes. He has no rales.       Abdominal: Soft. Bowel sounds are normal. He exhibits no distension. There is no tenderness.   Musculoskeletal: Normal range of motion. He exhibits no edema.   Neurological: He is alert and oriented to person, place, and time.   Skin: Skin is warm and dry. Rash noted.   Nursing note and vitals reviewed.      Significant Labs:  CBC:    Recent Labs  Lab 11/29/17  0504 11/29/17  0806 11/30/17  0539   WBC 6.95 7.33 7.34   RBC 2.77* 2.87* 3.10*   HGB 7.0* 7.2* 7.8*   HCT 21.7* 22.6* 24.2*    268 329   MCV 78* 79* 78*   MCH 25.3* 25.1* 25.2*   MCHC 32.3 31.9* 32.2     BNP:    Recent Labs  Lab 11/28/17  1147 11/29/17  0504   * 1,127*     CMP:    Recent Labs  Lab 11/28/17  1147 11/29/17  0503 11/29/17  1618 11/30/17  0539    79 77 93   CALCIUM 9.7 8.8 9.0 9.0   ALBUMIN 3.1* 2.6*  --  2.8*   PROT 7.4 6.2  --  6.8    136 137 138   K 4.0 3.8 3.9 3.9   CO2 27 29 28 25    101 101 101   BUN 15 12 12 16   CREATININE 1.0 0.9 0.9 1.0   ALKPHOS 74 55  --  66   ALT 9* 6*  --  8*   AST 20 19  --  19   BILITOT 0.5 0.6  --  0.6      Coagulation:     Recent Labs  Lab 11/28/17  1147 11/29/17  0504 11/30/17  0539   INR 1.9* 1.8* 1.8*   APTT 32.5*  --   --      LDH:    Recent Labs  Lab 11/29/17  0504 11/30/17  0539    180     Microbiology:  Microbiology Results (last 7 days)     ** No results found for the last 168 hours. **          I have reviewed all pertinent labs within the past 24 hours.    Estimated Creatinine Clearance: 62.8 mL/min (based on SCr of 1 mg/dL).    Diagnostic Results:  I have reviewed and interpreted all pertinent imaging results/findings within the  past 24 hours.    Assessment and Plan:     Mr. Suman Rob is a 68 yo male with a PMHx of NICM (EF 10%) s/p HeartMate 3 Implanted 7/27/2017 as DT, HTN, DLD and recent ICD revision on 11/20/17 with Dr. Vidal (DC ICD placed with active RA/LV leads. RV lead capped during revision) that was complicated by pocket hematoma. He was discharged on 11/22/17 with plan to follow up in the [place] EP clinic in a week but hasn't had follow up yet given the recent thanksgiving holiday. He presented to the  ED today after experiencing 4 shocks this AM while bearing down trying to have a bowel movement. He did not syncopize or have LOC. States he doesn't remember having palpitations immediately before or after event and has not had any additional shocks since the morning of 11/28. At ED his K was 4.0, Mg 2.1, INR 1.9,  (at baseline) and his CXR did not show any signs of lead migration. He was transferred here for further evaluation by EP. Denies recent illnesses or CHF symptoms. Denies dietary / medication non adherence. Of note, has been having some pain at the pocket/hematoma site that he has been taking tylenol with some relief. Denies fevers/chills or rigors.     * Defibrillator discharge    -VT therapy off  -Shocked inappropriately for AT  -Monitor on tele  -Appreciate EP input        LVAD (left ventricular assist device) present    -HeartMate 3 Implanted 7/27/2017 as DT  -HTS Primary  -Implanted by Dr. Downing  -Continue Coumadin, Goal INR 2.0-3.0. Subtherapeutic today.   -Antiplatelets ASA 81 mg  -LDH is stable overall today. Will continue to monitor daily.  -Speed set at 5200, LSL 4800 rpm  -Interrogation notable for no events  -Not listed for OHTx        Constipation    -Enema today        Subtherapeutic international normalized ratio (INR)    -INR today 1.8  -Goal INR: 2.0-3.0  -Patient is a heparin bridge candidate.  -Heparin gtt is: therapeutic today. Titrating for goal anti-Xa 0.3-0.5  -Continue  coumadin.            ICD (implantable cardioverter-defibrillator) pocket hematoma    -Pain control  -Management per EP  -Low dose heparin for subtherapeutic INR given hematoma. Xa range 0.3-0.5            Abigail Green PA-C  Heart Transplant  [facility]

## 2017-11-30 NOTE — PLAN OF CARE
Problem: Patient Care Overview  Goal: Plan of Care Review  Outcome: Ongoing (interventions implemented as appropriate)  Pt had no significant events overnight. Pt encouraged to call when needing assistance ambulating. Pt wife stays at the bedside and is a big help with pt doing daily task. LVAD numbers and MAPs remained within normal limits. Pt will possibly being d/lisa when INR is 2-3, INR 1.8 yesterday. POC reviewed with pt and spouse, both acknowledged understanding.

## 2017-11-30 NOTE — ASSESSMENT & PLAN NOTE
-INR today 1.8  -Goal INR: 2.0-3.0  -Patient is a heparin bridge candidate.  -Heparin gtt is: therapeutic today. Titrating for goal anti-Xa 0.3-0.5  -Continue coumadin.

## 2017-11-30 NOTE — SUBJECTIVE & OBJECTIVE
Interval History: constipation reported. Hematoma improving per patient.     Continuous Infusions:   heparin (porcine) in 5 % dex 1,000 Units/hr (11/29/17 2158)     Scheduled Meds:   amLODIPine  10 mg Oral Daily    aspirin  81 mg Oral Daily    furosemide  80 mg Oral BID    glycerin adult  1 suppository Rectal Once    hydrALAZINE  50 mg Oral Q8H    magnesium oxide  400 mg Oral BID    pantoprazole  40 mg Oral Daily    potassium chloride SA  40 mEq Oral BID    pravastatin  20 mg Oral QHS    sodium phosphates  1 enema Rectal Once    warfarin  2 mg Oral Daily     PRN Meds:hydrocodone-acetaminophen 5-325mg, ondansetron    Review of patient's allergies indicates:  No Known Allergies  Objective:     Vital Signs (Most Recent):  Temp: 98.2 °F (36.8 °C) (11/30/17 0900)  Pulse: 80 (11/30/17 0900)  Resp: 18 (11/30/17 0900)  BP: (!) 78/0 (11/30/17 0907)  SpO2: 97 % (11/30/17 0900) Vital Signs (24h Range):  Temp:  [98.1 °F (36.7 °C)-98.8 °F (37.1 °C)] 98.2 °F (36.8 °C)  Pulse:  [78-96] 80  Resp:  [16-18] 18  SpO2:  [94 %-98 %] 97 %  BP: ()/(0-69) 78/0     Patient Vitals for the past 72 hrs (Last 3 readings):   Weight   11/30/17 0700 61.9 kg (136 lb 7.4 oz)   11/29/17 0700 64.5 kg (142 lb 3.2 oz)   11/29/17 0229 62.8 kg (138 lb 7.2 oz)     Body mass index is 20.75 kg/m².      Intake/Output Summary (Last 24 hours) at 11/30/17 1056  Last data filed at 11/30/17 0500   Gross per 24 hour   Intake             1170 ml   Output             1150 ml   Net               20 ml       Hemodynamic Parameters:         Physical Exam   Constitutional: He is oriented to person, place, and time. He appears well-developed and well-nourished.   HENT:   Head: Normocephalic and atraumatic.   Neck: Normal range of motion. Neck supple. No JVD present.   Cardiovascular: Normal rate and regular rhythm.    vad hum smooth   Pulmonary/Chest: Effort normal and breath sounds normal. No respiratory distress. He has no wheezes. He has no rales.        Abdominal: Soft. Bowel sounds are normal. He exhibits no distension. There is no tenderness.   Musculoskeletal: Normal range of motion. He exhibits no edema.   Neurological: He is alert and oriented to person, place, and time.   Skin: Skin is warm and dry. Rash noted.   Nursing note and vitals reviewed.      Significant Labs:  CBC:    Recent Labs  Lab 11/29/17  0504 11/29/17  0806 11/30/17  0539   WBC 6.95 7.33 7.34   RBC 2.77* 2.87* 3.10*   HGB 7.0* 7.2* 7.8*   HCT 21.7* 22.6* 24.2*    268 329   MCV 78* 79* 78*   MCH 25.3* 25.1* 25.2*   MCHC 32.3 31.9* 32.2     BNP:    Recent Labs  Lab 11/28/17  1147 11/29/17  0504   * 1,127*     CMP:    Recent Labs  Lab 11/28/17  1147 11/29/17  0503 11/29/17  1618 11/30/17  0539    79 77 93   CALCIUM 9.7 8.8 9.0 9.0   ALBUMIN 3.1* 2.6*  --  2.8*   PROT 7.4 6.2  --  6.8    136 137 138   K 4.0 3.8 3.9 3.9   CO2 27 29 28 25    101 101 101   BUN 15 12 12 16   CREATININE 1.0 0.9 0.9 1.0   ALKPHOS 74 55  --  66   ALT 9* 6*  --  8*   AST 20 19  --  19   BILITOT 0.5 0.6  --  0.6      Coagulation:     Recent Labs  Lab 11/28/17  1147 11/29/17  0504 11/30/17  0539   INR 1.9* 1.8* 1.8*   APTT 32.5*  --   --      LDH:    Recent Labs  Lab 11/29/17  0504 11/30/17  0539    180     Microbiology:  Microbiology Results (last 7 days)     ** No results found for the last 168 hours. **          I have reviewed all pertinent labs within the past 24 hours.    Estimated Creatinine Clearance: 62.8 mL/min (based on SCr of 1 mg/dL).    Diagnostic Results:  I have reviewed and interpreted all pertinent imaging results/findings within the past 24 hours.

## 2017-11-30 NOTE — ASSESSMENT & PLAN NOTE
-Pain control  -Management per EP  -Low dose heparin for subtherapeutic INR given hematoma. Xa range 0.3-0.5

## 2017-11-30 NOTE — PROGRESS NOTES
[facility]  Cardiothoracic Surgery  Progress Note    Patient Name: Suman Hayden  MRN: [id number]  Admission Date: 11/29/2017  Hospital Length of Stay: 1 days  Code Status: Full Code   Attending Physician: Deejay Duff Jr.,*   Referring Provider: Karen Zamora MD  Principal Problem:Defibrillator discharge    Subjective:     Post-Op Info:  * No surgery found *         No new subjective & objective note has been filed under this hospital service since the last note was generated.    Assessment/Plan:     LVAD (left ventricular assist device) present    Daily E and M and VAD Interrogation Note    Reason for Visit: ICD fire at home   Patient is seen in follow up for management of:  [x] HeartMate III      Interval History:  The [x] implant 7/27/17  No Events overnight      Review of Systems:  All  systems reviewed and negative    Medications:  Current Facility-Administered Medications   Medication Dose Route Frequency Provider Last Rate Last Dose    amLODIPine tablet 10 mg  10 mg Oral Daily Giovanni Hayden DO   10 mg at 11/30/17 0918    aspirin chewable tablet 81 mg  81 mg Oral Daily Giovanni Hayden DO   81 mg at 11/30/17 0918    furosemide tablet 80 mg  80 mg Oral BID Giovanni Hayden DO   80 mg at 11/30/17 0918    glycerin adult suppository 1 suppository  1 suppository Rectal Once Susannah Elizabeth PA-C        heparin 25,000 units in dextrose 5% 250 mL (100 units/mL) infusion (heparin infusion)  1,000 Units/hr Intravenous Continuous Giovanni Hayden DO 10 mL/hr at 11/29/17 2158 1,000 Units/hr at 11/29/17 2158    hydrALAZINE tablet 50 mg  50 mg Oral Q8H Giovanni Hayden DO   50 mg at 11/30/17 0519    hydrocodone-acetaminophen 5-325mg per tablet 1 tablet  1 tablet Oral Q6H PRN Giovanni Hayden DO        magnesium oxide tablet 400 mg  400 mg Oral BID Giovanni Hayden DO   400 mg at 11/30/17 0918    ondansetron disintegrating tablet 4 mg  4 mg Oral Q8H PRN Giovanni Hayden DO         pantoprazole EC tablet 40 mg  40 mg Oral Daily Giovanni NORA Catracho, DO   40 mg at 11/30/17 0918    potassium chloride SA CR tablet 40 mEq  40 mEq Oral BID Giovanni ANGUIANOLeobardo Catracho, DO   40 mEq at 11/30/17 0918    pravastatin tablet 20 mg  20 mg Oral QHS Giovanni Hayden, DO   20 mg at 11/29/17 2150    sodium phosphates 19-7 gram/118 mL enema 1 enema  1 enema Rectal Once Abigail Green PA-C        warfarin (COUMADIN) tablet 2 mg  2 mg Oral Daily Susannah Elizabeth PA-C           Physical Examination:  Vital Signs:   Vitals:    11/30/17 1159   BP: 92/67   Pulse: 80   Resp: 18   Temp: 98.8 °F (37.1 °C)     Cardiovascular:  [x] Regular rate and rhythm. VAD sounds smooth   [x]  No edema []  Edema present  [x]  Clear to auscultation  []  Rales to []  Coarse  []  No rales but   [] Pedal Pulses absent  []  Pulses + throughout  Skin:  Incision is [x]  Clean, dry and intact.    Sternum:  [x]  Stable []  Unstable  Driveline(s):   [x]  Clean, dry and intact.       Labs:  Reviewed     X-Rays:  [x]  I reviewed  Chest x-ray    Procedure:  Device Interrogation including analysis of device parameters.  Current Settings   [x]  Ventricular Assist Device  []  Total Artificial Heart interrogated  Review of device function is [x]  Stable     TXP LVAD INTERROGATIONS 11/30/2017 11/30/2017 11/29/2017 11/29/2017 11/29/2017 11/29/2017 11/29/2017   Type HeartMate3 HeartMate3 HeartMate3 HeartMate3 HeartMate3 HeartMate3 HeartMate3   Flow 4.0 4.1 4.2 4.2 3.8 3.9 4.2   Speed 5200 5200 5200 5200 5200 5200 5200   PI 4.0 3.5 3.6 3.5 3.8 3.9 3.6   Power ([place]) 3.6 3.6 3.5 3.6 3.5 3.6 3.5   LSL 4800 4800 4800 4800 - - -   Pulsatility Pulse - - - - - -   Some recent data might be hidden       Assessment:  [x]  Primary Cardiomyopathy []  Congestive Heart Failure   []  Atrial Fibrillation [x]  Ventricular Tachycardia   []  Aftercare cardiac device [x]  Long term (current) use of anticoagulants   []  Ventilator-associated pneumonia []  Pneumonia viral,  unspecified   []  Pneumonia, bacterial, unspecified []  Pneumonia, organism unspecified   []  Hemorrhage of GI tract, unspecified    []  Nosebleed []  Driveline infection   []  Infection VAD device          Plan:  [x]  Interval history obtained from HTS attending team member during rounding today  []  VAD/MATT teaching performed with patient  [x]  Mobilization / Physical Therapy ongoing  [x]  Anticoagulation [x]  Ongoing []  Held  -EP following  -4 ICD shocks that were inappropriate, Atrial tachycardia   - Hematoma to left upper chest has not had an increase in swelling no drainage noted on gauze      Total time spent was 38 minutes.  Of which more than 50 percent of the care dominated counseling and coordinating care with different team members. The VAD was interrogated and all parameters were WNL and no significant findings were found in the history. All these findings are documented in the note above.      Date of Service: 11/30/2017                                 Geovanna Marques NP  Cardiothoracic Surgery  [facility]

## 2017-12-01 ENCOUNTER — ANTI-COAG VISIT (OUTPATIENT)
Dept: CARDIOLOGY | Facility: CLINIC | Age: 67
End: 2017-12-01

## 2017-12-01 VITALS
HEART RATE: 82 BPM | WEIGHT: 132.69 LBS | BODY MASS INDEX: 20.11 KG/M2 | HEIGHT: 68 IN | RESPIRATION RATE: 18 BRPM | SYSTOLIC BLOOD PRESSURE: 70 MMHG | TEMPERATURE: 98 F | OXYGEN SATURATION: 96 %

## 2017-12-01 LAB
ALBUMIN SERPL BCP-MCNC: 2.8 G/DL
ALP SERPL-CCNC: 60 U/L
ALT SERPL W/O P-5'-P-CCNC: 8 U/L
ANION GAP SERPL CALC-SCNC: 9 MMOL/L
AST SERPL-CCNC: 15 U/L
BASOPHILS # BLD AUTO: 0.06 K/UL
BASOPHILS NFR BLD: 0.9 %
BILIRUB SERPL-MCNC: 0.6 MG/DL
BNP SERPL-MCNC: 485 PG/ML
BUN SERPL-MCNC: 17 MG/DL
CALCIUM SERPL-MCNC: 9.1 MG/DL
CHLORIDE SERPL-SCNC: 101 MMOL/L
CO2 SERPL-SCNC: 29 MMOL/L
CREAT SERPL-MCNC: 1.1 MG/DL
CRP SERPL-MCNC: 5.9 MG/L
DIFFERENTIAL METHOD: ABNORMAL
EOSINOPHIL # BLD AUTO: 0.5 K/UL
EOSINOPHIL NFR BLD: 8.1 %
ERYTHROCYTE [DISTWIDTH] IN BLOOD BY AUTOMATED COUNT: 17 %
EST. GFR  (AFRICAN AMERICAN): >60 ML/MIN/1.73 M^2
EST. GFR  (NON AFRICAN AMERICAN): >60 ML/MIN/1.73 M^2
FACT X PPP CHRO-ACNC: 0.43 IU/ML
GLUCOSE SERPL-MCNC: 94 MG/DL
HCT VFR BLD AUTO: 24.5 %
HGB BLD-MCNC: 7.7 G/DL
IMM GRANULOCYTES # BLD AUTO: 0.03 K/UL
IMM GRANULOCYTES NFR BLD AUTO: 0.5 %
INR PPP: 2.4
LDH SERPL L TO P-CCNC: 160 U/L
LYMPHOCYTES # BLD AUTO: 1.8 K/UL
LYMPHOCYTES NFR BLD: 27.8 %
MAGNESIUM SERPL-MCNC: 2.4 MG/DL
MCH RBC QN AUTO: 24.7 PG
MCHC RBC AUTO-ENTMCNC: 31.4 G/DL
MCV RBC AUTO: 79 FL
MONOCYTES # BLD AUTO: 0.7 K/UL
MONOCYTES NFR BLD: 10.7 %
NEUTROPHILS # BLD AUTO: 3.4 K/UL
NEUTROPHILS NFR BLD: 52 %
NRBC BLD-RTO: 0 /100 WBC
PLATELET # BLD AUTO: 331 K/UL
PMV BLD AUTO: 10.6 FL
POTASSIUM SERPL-SCNC: 3.9 MMOL/L
PREALB SERPL-MCNC: 17 MG/DL
PROT SERPL-MCNC: 6.7 G/DL
PROTHROMBIN TIME: 23.7 SEC
RBC # BLD AUTO: 3.12 M/UL
SODIUM SERPL-SCNC: 139 MMOL/L
WBC # BLD AUTO: 6.45 K/UL

## 2017-12-01 PROCEDURE — 63600175 PHARM REV CODE 636 W HCPCS: Performed by: PHYSICIAN ASSISTANT

## 2017-12-01 PROCEDURE — 83880 ASSAY OF NATRIURETIC PEPTIDE: CPT

## 2017-12-01 PROCEDURE — 85520 HEPARIN ASSAY: CPT

## 2017-12-01 PROCEDURE — 25000003 PHARM REV CODE 250: Performed by: INTERNAL MEDICINE

## 2017-12-01 PROCEDURE — 83735 ASSAY OF MAGNESIUM: CPT

## 2017-12-01 PROCEDURE — 27000248 HC VAD-ADDITIONAL DAY

## 2017-12-01 PROCEDURE — 99233 SBSQ HOSP IP/OBS HIGH 50: CPT | Mod: ,,, | Performed by: INTERNAL MEDICINE

## 2017-12-01 PROCEDURE — 83615 LACTATE (LD) (LDH) ENZYME: CPT

## 2017-12-01 PROCEDURE — 84134 ASSAY OF PREALBUMIN: CPT

## 2017-12-01 PROCEDURE — 85025 COMPLETE CBC W/AUTO DIFF WBC: CPT

## 2017-12-01 PROCEDURE — 85610 PROTHROMBIN TIME: CPT

## 2017-12-01 PROCEDURE — 36415 COLL VENOUS BLD VENIPUNCTURE: CPT

## 2017-12-01 PROCEDURE — 86140 C-REACTIVE PROTEIN: CPT

## 2017-12-01 PROCEDURE — 80053 COMPREHEN METABOLIC PANEL: CPT

## 2017-12-01 RX ORDER — POLYETHYLENE GLYCOL 3350 17 G/17G
17 POWDER, FOR SOLUTION ORAL 2 TIMES DAILY PRN
Qty: 1700 G | Refills: 3 | Status: ON HOLD | OUTPATIENT
Start: 2017-12-01 | End: 2017-01-01

## 2017-12-01 RX ORDER — DRONABINOL 5 MG/1
5 CAPSULE ORAL
Qty: 90 CAPSULE | Refills: 5
Start: 2017-12-01 | End: 2017-01-01 | Stop reason: SDUPTHER

## 2017-12-01 RX ORDER — FERROUS BIS-GLYCINATE CHELATE
1 POWDER (GRAM) MISCELLANEOUS DAILY
COMMUNITY
Start: 2017-12-01 | End: 2017-01-01

## 2017-12-01 RX ORDER — WARFARIN 2 MG/1
TABLET ORAL
Qty: 45 TABLET | Refills: 11 | Status: ON HOLD | OUTPATIENT
Start: 2017-12-01 | End: 2017-01-01

## 2017-12-01 RX ADMIN — ASPIRIN 81 MG CHEWABLE TABLET 81 MG: 81 TABLET CHEWABLE at 09:12

## 2017-12-01 RX ADMIN — PANTOPRAZOLE SODIUM 40 MG: 40 TABLET, DELAYED RELEASE ORAL at 09:12

## 2017-12-01 RX ADMIN — DRONABINOL 5 MG: 2.5 CAPSULE ORAL at 09:12

## 2017-12-01 RX ADMIN — HYDRALAZINE HYDROCHLORIDE 50 MG: 50 TABLET ORAL at 05:12

## 2017-12-01 RX ADMIN — POTASSIUM CHLORIDE 40 MEQ: 1500 TABLET, EXTENDED RELEASE ORAL at 09:12

## 2017-12-01 RX ADMIN — MAGNESIUM OXIDE TAB 400 MG (241.3 MG ELEMENTAL MG) 400 MG: 400 (241.3 MG) TAB at 09:12

## 2017-12-01 RX ADMIN — AMLODIPINE BESYLATE 10 MG: 10 TABLET ORAL at 09:12

## 2017-12-01 RX ADMIN — FUROSEMIDE 80 MG: 80 TABLET ORAL at 09:12

## 2017-12-01 NOTE — DISCHARGE SUMMARY
[facility]  Heart Transplant  Discharge Summary      Patient Name: Suman Hayden  MRN: [id number]  Admission Date: 11/29/2017  Hospital Length of Stay: 2 days  Discharge Date and Time: 12/01/2017 1:18 PM  Attending Physician: No att. providers found   Discharging Provider: Abigail Green PA-C  Primary Care Provider: Joe Ernst MD     HPI: 66 y/o male with a PMHx of NICM (EF 10%) s/p HeartMate 3 Implanted 7/27/2017 as DT, HTN, DLD and recent ICD revision on 11/20/17 with Dr. Vidal (DC ICD placed with active RA/LV leads. RV lead capped during revision) that was complicated by pocket hematoma. He was discharged on 11/22/17 with plan to follow up in the [place] EP clinic in a week but had not had follow up yet given the recent thanksgiving holiday, admitted as a transfer after presenting to [place] with ICD shocks.     * No surgery found *     Hospital Course: ICD was reprogrammed in [place] as the shocks for VT were inappropriate as the rhythm was AT. EP was consulted on admiission but no changes were made given the ICD had already been reprogrammed in BR. Hematoma was improving during the hospitalization. His INR was subtherapeutic and he was initiated on a heparin gtt (low dose given hematoma). INR became subtherapeutic and was discharged home.     Consults         Status Ordering Provider     Inpatient consult to Electrophysiology  Once     Provider:  (Not yet assigned)    Completed SARA HAYDEN          Significant Diagnostic Studies: see epic    Pending Diagnostic Studies:     None        Final Active Diagnoses:    Diagnosis Date Noted POA    PRINCIPAL PROBLEM:  Defibrillator discharge [Z45.02] 11/29/2017 Not Applicable    LVAD (left ventricular assist device) present [Z95.811] 07/29/2017 Not Applicable    ICD (implantable cardioverter-defibrillator) pocket hematoma [T82.837A] 11/30/2017 Yes    Subtherapeutic international normalized ratio (INR) [R79.1] 11/30/2017 Unknown     Constipation [K59.00] 11/30/2017 Unknown      Problems Resolved During this Admission:    Diagnosis Date Noted Date Resolved POA      Discharged Condition: stable    Disposition: Home Health    Follow Up:  Follow-up Information     [facility]PL[facility], LVADN In 1 week.    Specialty:  Transplant               Patient Instructions:     Diet general     Call MD for:  persistent nausea and vomiting or diarrhea     Call MD for:  temperature >100.4     Call MD for:  severe uncontrolled pain     Call MD for:  redness, tenderness, or signs of infection (pain, swelling, redness, odor or green/yellow discharge around incision site)     Call MD for:  difficulty breathing or increased cough     Call MD for:  severe persistent headache     Call MD for:  worsening rash     Call MD for:  persistent dizziness, light-headedness, or visual disturbances     Call MD for:  increased confusion or weakness       Medications:  Reconciled Home Medications:   Discharge Medication List as of 12/1/2017 10:31 AM      CONTINUE these medications which have CHANGED    Details   warfarin (COUMADIN) 2 MG tablet Take 3mg orally on Tuesday, Thursday and Saturday and 2mg orally on other days as directed by coumadin clinic, Normal         CONTINUE these medications which have NOT CHANGED    Details   amLODIPine (NORVASC) 10 MG tablet Take 1 tablet (10 mg total) by mouth once daily., Starting Thu 10/26/2017, Until Fri 10/26/2018, Normal      aspirin 81 MG Chew Take 81 mg by mouth once daily., Historical Med      furosemide (LASIX) 80 MG tablet Take 1 tablet (80 mg total) by mouth 2 (two) times daily., Starting Fri 9/22/2017, Until Sat 9/22/2018, Normal      hydrALAZINE (APRESOLINE) 50 MG tablet Take 1 tablet (50 mg total) by mouth every 8 (eight) hours., Starting Thu 10/26/2017, Until Fri 10/26/2018, Normal      hydrocodone-acetaminophen 5-325mg (NORCO) 5-325 mg per tablet Take 1 tablet by mouth every 6 (six) hours as needed (Acute pain, Z95.811 left  ventricular assist device placement)., Starting Wed 9/20/2017, Print      magnesium oxide (MAG-OX) 400 mg tablet Take 1 tablet (400 mg total) by mouth 2 (two) times daily., Starting Wed 11/22/2017, OTC      ondansetron (ZOFRAN-ODT) 4 MG TbDL Take 1 tablet (4 mg total) by mouth every 8 (eight) hours as needed., Starting Thu 10/26/2017, Normal      pantoprazole (PROTONIX) 40 MG tablet Take 1 tablet (40 mg total) by mouth once daily., Starting Thu 10/26/2017, Until Fri 10/26/2018, Normal      potassium chloride SA (K-DUR,KLOR-CON) 20 MEQ tablet Take 2 tablets (40 mEq total) by mouth 2 (two) times daily., Starting Thu 10/26/2017, Normal      pravastatin (PRAVACHOL) 20 MG tablet Take 1 tablet (20 mg total) by mouth every evening., Starting Thu 10/26/2017, Normal      dronabinol (MARINOL) 5 MG capsule Take 1 capsule (5 mg total) by mouth 2 (two) times daily before meals., Starting Thu 10/26/2017, Tamanna Green PA-C  Heart Transplant  [facility]

## 2017-12-01 NOTE — PROGRESS NOTES
Mr. Suman Hayden is being discharged today following admission for evaluation of ICD shocks. His pMH is significant for HM3 lvad, and HTN.     During his admission he was evaluated by EP and his ICD was reprogrammed.  His inr goal remains 2-3. He was administered coumadin doses of 3mg, 5mg and 3mg. His inr increased from 1.8 to 2.4. His home dose was increased to 3mg orally on Tues/Thur/Sat and 2mg on other days. He continues on aspirin 81mg orally daily. Follow up inr requested for Monday with coumadin clinic.     Mr. Hayden was provided a new medication administration card prior to discharge. He requested a prescription for miralax and reported taking gentle form of iron at home. These changes were made. Thank you.

## 2017-12-01 NOTE — PROGRESS NOTES
D/C note:    SW to pt's room for D/C. Pt aaox4, calm, and pleasant. Pt reports in agreement with plan to D/C home today with [facility] ([phone] ph, [phone] fax). SW faxed HH orders to [place] and confirmed receipt with Shana. Pt reports wife is loading the car and will provide transport home. Pt reports coping well at this time with no other needs from SW. SW providing psychosocial and counseling support, education, assistance, resources, and D/C planning as indicated. SW remains available.

## 2017-12-01 NOTE — PLAN OF CARE
Problem: Patient Care Overview  Goal: Plan of Care Review  Outcome: Ongoing (interventions implemented as appropriate)  Pt had no significant events overnight. Pt did seems little down last night. Pt encouraged to call when needing assistance ambulating. Pt wife stays at the bedside and is a big help with pt doing daily task. LVAD numbers and MAPs remained within normal limits. Pt will possibly being d/lisa when INR is 2-3, INR 1.8 yesterday. POC reviewed with pt and spouse, both acknowledged understanding.

## 2017-12-01 NOTE — HOSPITAL COURSE
ICD was reprogrammed in BR as the shocks for VT were inappropriate as the rhythm was AT. EP was consulted on admiission but no changes were made given the ICD had already been reprogrammed in BR. Hematoma was improving during the hospitalization. His INR was subtherapeutic and he was initiated on a heparin gtt (low dose given hematoma). INR became subtherapeutic and was discharged home.

## 2017-12-01 NOTE — ASSESSMENT & PLAN NOTE
-INR today 2.4  -Goal INR: 2.0-3.0  -Patient is a heparin bridge candidate.  -Stop heparin gtt  -Continue coumadin.

## 2017-12-01 NOTE — SUBJECTIVE & OBJECTIVE
Interval History: Had a BM with enema, manual disimpaction by wife and nurse reported. Feeling much better today.Hematoma still improving. INR 2.4.    Continuous Infusions:   heparin (porcine) in 5 % dex 1,000 Units/hr (11/30/17 1702)     Scheduled Meds:   amLODIPine  10 mg Oral Daily    aspirin  81 mg Oral Daily    dronabinol  5 mg Oral BID    furosemide  80 mg Oral BID    glycerin adult  1 suppository Rectal Once    hydrALAZINE  50 mg Oral Q8H    magnesium oxide  400 mg Oral BID    pantoprazole  40 mg Oral Daily    potassium chloride SA  40 mEq Oral BID    pravastatin  20 mg Oral QHS    sodium phosphates  1 enema Rectal Once    warfarin  3 mg Oral Daily     PRN Meds:hydrocodone-acetaminophen 5-325mg, ondansetron    Review of patient's allergies indicates:  No Known Allergies  Objective:     Vital Signs (Most Recent):  Temp: 98.4 °F (36.9 °C) (12/01/17 0934)  Pulse: 82 (12/01/17 0934)  Resp: 18 (12/01/17 0934)  BP: (!) 70/0 (12/01/17 0934)  SpO2: 96 % (12/01/17 0934) Vital Signs (24h Range):  Temp:  [98.4 °F (36.9 °C)-99.5 °F (37.5 °C)] 98.4 °F (36.9 °C)  Pulse:  [] 82  Resp:  [16-18] 18  SpO2:  [95 %-97 %] 96 %  BP: (70-92)/(0-69) 70/0     Patient Vitals for the past 72 hrs (Last 3 readings):   Weight   12/01/17 0700 60.2 kg (132 lb 11.5 oz)   11/30/17 0700 61.9 kg (136 lb 7.4 oz)   11/29/17 0700 64.5 kg (142 lb 3.2 oz)     Body mass index is 20.18 kg/m².      Intake/Output Summary (Last 24 hours) at 12/01/17 0945  Last data filed at 12/01/17 0500   Gross per 24 hour   Intake              230 ml   Output             1525 ml   Net            -1295 ml       Hemodynamic Parameters:         Physical Exam   Constitutional: He is oriented to person, place, and time. He appears well-developed and well-nourished.   HENT:   Head: Normocephalic and atraumatic.   Neck: Normal range of motion. Neck supple. No JVD present.   Cardiovascular: Normal rate and regular rhythm.    vad hum smooth   Pulmonary/Chest:  Effort normal and breath sounds normal. No respiratory distress. He has no wheezes. He has no rales.       Abdominal: Soft. Bowel sounds are normal. He exhibits no distension. There is no tenderness.   Musculoskeletal: Normal range of motion. He exhibits no edema.   Neurological: He is alert and oriented to person, place, and time.   Skin: Skin is warm and dry.   Nursing note and vitals reviewed.      Significant Labs:  CBC:    Recent Labs  Lab 11/29/17  0806 11/30/17  0539 12/01/17  0602   WBC 7.33 7.34 6.45   RBC 2.87* 3.10* 3.12*   HGB 7.2* 7.8* 7.7*   HCT 22.6* 24.2* 24.5*    329 331   MCV 79* 78* 79*   MCH 25.1* 25.2* 24.7*   MCHC 31.9* 32.2 31.4*     BNP:    Recent Labs  Lab 11/28/17  1147 11/29/17  0504 12/01/17  0602   * 1,127* 485*     CMP:    Recent Labs  Lab 11/29/17  0503 11/29/17  1618 11/30/17  0539 12/01/17  0602   GLU 79 77 93 94   CALCIUM 8.8 9.0 9.0 9.1   ALBUMIN 2.6*  --  2.8* 2.8*   PROT 6.2  --  6.8 6.7    137 138 139   K 3.8 3.9 3.9 3.9   CO2 29 28 25 29    101 101 101   BUN 12 12 16 17   CREATININE 0.9 0.9 1.0 1.1   ALKPHOS 55  --  66 60   ALT 6*  --  8* 8*   AST 19  --  19 15   BILITOT 0.6  --  0.6 0.6      Coagulation:     Recent Labs  Lab 11/28/17  1147 11/29/17  0504 11/30/17  0539 12/01/17  0602   INR 1.9* 1.8* 1.8* 2.4*   APTT 32.5*  --   --   --      LDH:    Recent Labs  Lab 11/29/17  0504 11/30/17  0539 12/01/17  0602    180 160     Microbiology:  Microbiology Results (last 7 days)     ** No results found for the last 168 hours. **          I have reviewed all pertinent labs within the past 24 hours.    Estimated Creatinine Clearance: 55.5 mL/min (based on SCr of 1.1 mg/dL).    Diagnostic Results:  I have reviewed and interpreted all pertinent imaging results/findings within the past 24 hours.

## 2017-12-01 NOTE — NURSING
Pt d/c home per MD order. Telemetry removed. Telemetry room notified. IV access removed and intact x1. VSS. No distress noted. No complaints noted at this time. Pt given and explained medication list and prescriptions. Pt verbalizes complete understanding of all discharge instructions and follow up care. Pt given printed AVS. Sign, copied, placed, and charted.  Wife at bedside.  Awaiting escort. Will continue to monitor.

## 2017-12-01 NOTE — PROGRESS NOTES
Per note: Mr. Suman Hayden is being discharged today following admission for evaluation of ICD shocks. His pMH is significant for HM3 lvad, and HTN.      During his admission he was evaluated by EP and his ICD was reprogrammed.  His inr goal remains 2-3. He was administered coumadin doses of 3mg, 5mg and 3mg. His inr increased from 1.8 to 2.4. His home dose was increased to 3mg orally on Tues/Thur/Sat and 2mg on other days. He continues on aspirin 81mg orally daily. Follow up inr requested for Monday with coumadin clinic. Per Marjorie, INR 12/4. Wife confirms dose.

## 2017-12-01 NOTE — PROGRESS NOTES
[facility]  Heart Transplant  Progress Note    Patient Name: Suman Hayden  MRN: [id number]  Admission Date: 11/29/2017  Hospital Length of Stay: 2 days  Attending Physician: Deejay Duff Jr.,*  Primary Care Provider: Joe Ernst MD  Principal Problem:Defibrillator discharge    Subjective:     Interval History: Had a BM with enema, manual disimpaction by wife and nurse reported. Feeling much better today.Hematoma still improving. INR 2.4.    Continuous Infusions:   heparin (porcine) in 5 % dex 1,000 Units/hr (11/30/17 1702)     Scheduled Meds:   amLODIPine  10 mg Oral Daily    aspirin  81 mg Oral Daily    dronabinol  5 mg Oral BID    furosemide  80 mg Oral BID    glycerin adult  1 suppository Rectal Once    hydrALAZINE  50 mg Oral Q8H    magnesium oxide  400 mg Oral BID    pantoprazole  40 mg Oral Daily    potassium chloride SA  40 mEq Oral BID    pravastatin  20 mg Oral QHS    sodium phosphates  1 enema Rectal Once    warfarin  3 mg Oral Daily     PRN Meds:hydrocodone-acetaminophen 5-325mg, ondansetron    Review of patient's allergies indicates:  No Known Allergies  Objective:     Vital Signs (Most Recent):  Temp: 98.4 °F (36.9 °C) (12/01/17 0934)  Pulse: 82 (12/01/17 0934)  Resp: 18 (12/01/17 0934)  BP: (!) 70/0 (12/01/17 0934)  SpO2: 96 % (12/01/17 0934) Vital Signs (24h Range):  Temp:  [98.4 °F (36.9 °C)-99.5 °F (37.5 °C)] 98.4 °F (36.9 °C)  Pulse:  [] 82  Resp:  [16-18] 18  SpO2:  [95 %-97 %] 96 %  BP: (70-92)/(0-69) 70/0     Patient Vitals for the past 72 hrs (Last 3 readings):   Weight   12/01/17 0700 60.2 kg (132 lb 11.5 oz)   11/30/17 0700 61.9 kg (136 lb 7.4 oz)   11/29/17 0700 64.5 kg (142 lb 3.2 oz)     Body mass index is 20.18 kg/m².      Intake/Output Summary (Last 24 hours) at 12/01/17 0928  Last data filed at 12/01/17 0500   Gross per 24 hour   Intake              230 ml   Output             1525 ml   Net            -1295 ml       Hemodynamic  Parameters:         Physical Exam   Constitutional: He is oriented to person, place, and time. He appears well-developed and well-nourished.   HENT:   Head: Normocephalic and atraumatic.   Neck: Normal range of motion. Neck supple. No JVD present.   Cardiovascular: Normal rate and regular rhythm.    vad hum smooth   Pulmonary/Chest: Effort normal and breath sounds normal. No respiratory distress. He has no wheezes. He has no rales.       Abdominal: Soft. Bowel sounds are normal. He exhibits no distension. There is no tenderness.   Musculoskeletal: Normal range of motion. He exhibits no edema.   Neurological: He is alert and oriented to person, place, and time.   Skin: Skin is warm and dry.   Nursing note and vitals reviewed.      Significant Labs:  CBC:    Recent Labs  Lab 11/29/17  0806 11/30/17  0539 12/01/17  0602   WBC 7.33 7.34 6.45   RBC 2.87* 3.10* 3.12*   HGB 7.2* 7.8* 7.7*   HCT 22.6* 24.2* 24.5*    329 331   MCV 79* 78* 79*   MCH 25.1* 25.2* 24.7*   MCHC 31.9* 32.2 31.4*     BNP:    Recent Labs  Lab 11/28/17  1147 11/29/17  0504 12/01/17  0602   * 1,127* 485*     CMP:    Recent Labs  Lab 11/29/17  0503 11/29/17  1618 11/30/17  0539 12/01/17  0602   GLU 79 77 93 94   CALCIUM 8.8 9.0 9.0 9.1   ALBUMIN 2.6*  --  2.8* 2.8*   PROT 6.2  --  6.8 6.7    137 138 139   K 3.8 3.9 3.9 3.9   CO2 29 28 25 29    101 101 101   BUN 12 12 16 17   CREATININE 0.9 0.9 1.0 1.1   ALKPHOS 55  --  66 60   ALT 6*  --  8* 8*   AST 19  --  19 15   BILITOT 0.6  --  0.6 0.6      Coagulation:     Recent Labs  Lab 11/28/17  1147 11/29/17  0504 11/30/17  0539 12/01/17  0602   INR 1.9* 1.8* 1.8* 2.4*   APTT 32.5*  --   --   --      LDH:    Recent Labs  Lab 11/29/17  0504 11/30/17  0539 12/01/17  0602    180 160     Microbiology:  Microbiology Results (last 7 days)     ** No results found for the last 168 hours. **          I have reviewed all pertinent labs within the past 24 hours.    Estimated Creatinine  Clearance: 55.5 mL/min (based on SCr of 1.1 mg/dL).    Diagnostic Results:  I have reviewed and interpreted all pertinent imaging results/findings within the past 24 hours.    Assessment and Plan:     Mr. Suman Rob is a 66 yo male with a PMHx of NICM (EF 10%) s/p HeartMate 3 Implanted 7/27/2017 as DT, HTN, DLD and recent ICD revision on 11/20/17 with Dr. Vidal (DC ICD placed with active RA/LV leads. RV lead capped during revision) that was complicated by pocket hematoma. He was discharged on 11/22/17 with plan to follow up in the [place] EP clinic in a week but hasn't had follow up yet given the recent thanksgiving holiday. He presented to the  ED today after experiencing 4 shocks this AM while bearing down trying to have a bowel movement. He did not syncopize or have LOC. States he doesn't remember having palpitations immediately before or after event and has not had any additional shocks since the morning of 11/28. At ED his K was 4.0, Mg 2.1, INR 1.9,  (at baseline) and his CXR did not show any signs of lead migration. He was transferred here for further evaluation by EP. Denies recent illnesses or CHF symptoms. Denies dietary / medication non adherence. Of note, has been having some pain at the pocket/hematoma site that he has been taking tylenol with some relief. Denies fevers/chills or rigors.     * Defibrillator discharge    -VT therapy off  -Shocked inappropriately for AT  -Monitor on tele  -Appreciate EP input        LVAD (left ventricular assist device) present    -HeartMate 3 Implanted 7/27/2017 as DT  -HTS Primary  -Implanted by Dr. Downing  -Continue Coumadin, Goal INR 2.0-3.0. Therapeutic today.   -Antiplatelets ASA 81 mg  -LDH is stable overall today. Will continue to monitor daily.  -Speed set at 5200, LSL 4800 rpm  -Interrogation notable for no events  -Not listed for OHTx        Constipation    -Enema yesterday, had a BM  -Miralax at home daily will be initiated per wife         Subtherapeutic international normalized ratio (INR)    -INR today 2.4  -Goal INR: 2.0-3.0  -Patient is a heparin bridge candidate.  -Stop heparin gtt  -Continue coumadin.            ICD (implantable cardioverter-defibrillator) pocket hematoma    -Pain control  -Management per EP  -Improving daily.           D/c today with INR check Monday, labs in 1 week, clinic in 2 weeks.    Abigail Green PA-C  Heart Transplant  [facility]

## 2017-12-01 NOTE — ASSESSMENT & PLAN NOTE
-HeartMate 3 Implanted 7/27/2017 as DT  -HTS Primary  -Implanted by Dr. Downing  -Continue Coumadin, Goal INR 2.0-3.0. Therapeutic today.   -Antiplatelets ASA 81 mg  -LDH is stable overall today. Will continue to monitor daily.  -Speed set at 5200, LSL 4800 rpm  -Interrogation notable for no events  -Not listed for OHTx

## 2017-12-01 NOTE — PLAN OF CARE
[facility]   Heart Transplant/VAD Clinic   [address] [place] [address]   [phone] after hours          [phone] fax     VAD HOME  HEALTH ORDERS      Admit to Home Health    Diagnosis:   Patient Active Problem List   Diagnosis    Nonischemic cardiomyopathy    Encounter for monitoring amiodarone therapy    Essential hypertension    Hyperlipidemia    V-tach    Elevated PSA    Hepatitis B core antibody positive since 2012    Chronic systolic congestive heart failure    Heart transplant candidate    Hyperbilirubinemia    Malfunction of implantable cardioverter-defibrillator (ICD) electrode    Syncope and collapse    Atrial tachycardia    Hyperglycemia    AICD (automatic cardioverter/defibrillator) present    LVAD (left ventricular assist device) present    Atrial fibrillation    Heart replaced by heart assist device    Anticoagulation monitoring, INR range 2-3    CHF (congestive heart failure)    Defibrillator discharge    ICD (implantable cardioverter-defibrillator) pocket hematoma    Subtherapeutic international normalized ratio (INR)    Constipation       Patient is homebound due to:  Chronic systolic heart failure s/p LVAD    Diet: Low Fat, Low cholesterol, 2Gm Na, Coumadin restrictions.    Acitivities: No Swimming, bathing, vacuuming, contact sports.    Fresh implants= Sternal Precautions    Nursing:   SN to complete comprehensive assessment including routine vital signs. Instruct on disease process and s/s of complications to report to MD. Review/verify medication list sent home with the patient at time of discharge  and instruct patient/caregiver as needed. Frequency may be adjusted depending on start of care date.    **LVAD driveline exit site dressing change is to be completed per LVAD patient/caregiver only**.    Notify MD if SBP > 160 or < 90; DBP > 90 or < 50; HR > 120 or < 50; Temp > 101; Weight gain >3lbs in 1 day or  5lbs in 1 week.      LABS:  SN to perform labs: PT/INR per Coumadin clinic [phone].   Follow up INR date: 12/4/2017  No Finger Sticks    Send initial Home Health orders to HTS attending physician on call.  Send follow up questions to VAD clinic MD [phone] or [phone].

## 2017-12-05 NOTE — PROGRESS NOTES
Please forward this important TCC information to your provider in order to maximize the post discharge care delivery of this patient.    C3 nurse spoke with the wife of Suman Hayden  for a TCC post hospital discharge follow up call. The patient has a scheduled HEARTTX appointment with Angie Torres on 12\6\17 @ 1130.  Respectfully,  Edwige CrawfordRN  [facility]    [email or web address]       Please do not reply to this message, as this inbox is not routinely monitored.     Wife states she has called multiple agencies for help with utilities and unsuccessful. Given names and\or email for the following: [facility] [phone], [facility] [phone], Critical Care Coordination [email or web address], and Housing and Emergency Assistance [phone].

## 2017-12-05 NOTE — PROGRESS NOTES
Wife was called and given lab result, verified correct dose of coumadin, reports dronabinol was increased to 3 daily, wife was advised of coumadin instructions and redraw date

## 2017-12-05 NOTE — PATIENT INSTRUCTIONS
AICD Discharge    Your AICD. (automated implantable cardioverter/defibrillator) delivered a shock today. Checking the device memory will help your healthcare team figure out the cause of the shock. Most of the time a shock is delivered only when a life-threatening arrhythmia appears. In this case, it is life-saving. Rarely, the AICD may discharge in error.  The shock can be painful and you may feel soreness in your muscles or just shaken up emotionally from the unexpected event.  If your AICD is discharging often, the cause can be determined by checking the recordings of your heart rhythm just before the discharge occurred. Then your healthcare provider can adjust the AICD settings, assess any changes in your heart condition, or modify your anti-arrhythmia medicines to reduce the frequency of discharge.  Home care  The following guidelines will help you care for yourself at home:  · Tell your healthcare provider if you have an AICD shock. He or she will advise you if you need to go to the hospital immediately.  · Rest today and resume your usual activities tomorrow.  · Don't push or pull on the pacemaker. This may cause the wires to become twisted.  · Don't operate cell phones close to the generator.  · Because of the unexpected nature of arrhythmias and the AICD discharge, driving can be dangerous. It is often recommended that you have a 6-month shock-free period before driving again. Talk with your healthcare provider about whether or not is safe for you to drive. You will not be eligible for a 's license if you have an AICD because of the high risk for losing consciousness with the life-threatening heart rhythms that this device is designed to treat.  Follow-up care  Follow up with your healthcare provider, or as advised.  It is important your healthcare provider checks your device and the battery to make sure they are working properly after the device has fired. The battery  life is  controlled by how much your heart uses the device. The more times you receive a shock from the device, the shorter time the battery will last. You should ask your provider how often your device should be checked, but generally, you should have the battery checked at least every 6 months. Once the battery gets low enough, it goes into an energy-saving mode and the generator will have to be changed. In general, the generator has to be changed every 5 to 7 years.  Call 911  Call 911 if any of these occur:  · Multiple shocks  · Chest pain  · Trouble breathing  · Difficulty with speech or vision, weakness of an arm or leg  · Weakness, dizziness, lightheadedness, or fainting  · Palpitations (the sense that your heart is fluttering or beating fast or hard or irregularly)  When to seek medical advice  Get prompt medical attention if you have any of the following:  · Another AICD discharge  · Prolonged hiccups  · Pain, swelling, redness, drainage, bleeding, or warmth from the implant site  · A fever above 100.4°F (38°C)  · Your generator feels loose or like it is wiggling in the pocket under the skin  Date Last Reviewed: 3/30/2016  © 3659-9293 [employer]. [address] [place] [address], [place] [address]. All rights reserved. This information is not intended as a substitute for professional medical care. Always follow your healthcare professional's instructions.

## 2017-12-05 NOTE — TELEPHONE ENCOUNTER
----- Message from Gricelda Joshi sent at 12/5/2017 11:18 AM CST -----  Contact: pt's wife  Pls call pt's wife at [phone].  She is saying that her  is supposed to be seeing Dr. Winchester tomorrow when he comes in for his other appts but there is no appt scheduled.      Thank you

## 2017-12-05 NOTE — TELEPHONE ENCOUNTER
Pt called reporting she was told pt was supposed to see Dr. Winchester tomorrow while here for LVAD appt. Advised pt that Dr. Winchester does not recall instructing anyone to tell them that. Pt s/p lead revision on 11/20/17, then ICD shock last week. Wife reports he has dressing over ICD site still due to hematoma. Consulted with device staff, and instructed wife to remove dressing today. Instructed on s/s infection. Instructed to come by if any concerns once dressing removed. Understanding verbalized.

## 2017-12-06 NOTE — INTERVAL H&P NOTE
The patient has been examined and the H&P has been reviewed:    Admit to HTS  Will transfuse 2 units PRBC  IV iron tomorrow  Hold coumadin, dc aspirin   GI consult        There are no hospital problems to display for this patient.

## 2017-12-06 NOTE — PROGRESS NOTES
Direct admit to room 350 via w/c with wife at bedside. Complete assessment done. Oriented to room. All questions addressed. Denies c/o pain or SOB

## 2017-12-06 NOTE — LETTER
December 6, 2017        Joe Ernst  [address] [place] [address]  Phone: [phone]  Fax: [phone]             [facility] [address] [place] [address]  Phone: [phone]   Patient: Suman Hayden   MR Number: [id number]   YOB: 1950   Date of Visit: 12/6/2017       Dear Dr. Joe Ernst    Thank you for referring Suman Hayden to me for evaluation. Attached you will find relevant portions of my assessment and plan of care.    If you have questions, please do not hesitate to call me. I look forward to following Suman Hayden along with you.    Sincerely,    Angie Torres MD    Enclosure    If you would like to receive this communication electronically, please contact [email or web address] or [phone] to request [employer] access.    [employer] is a tool which provides read-only access to select patient information with whom you have a relationship. Its easy to use and provides real time access to review your patients record including encounter summaries, notes, results, and demographic information.    If you feel you have received this communication in error or would no longer like to receive these types of communications, please e-mail [email or web address]

## 2017-12-06 NOTE — H&P (VIEW-ONLY)
"Subjective:   Patient ID:  Suman Hayden is a 67 y.o. male who presents for LVAD followup visit.        Implant Date: 7/27/17  VAD Type : HM III    VAD speed is at 5200 rpm.   Implanted for : DT     No VAD alarms noted on interrogation occasional PI events .   BP is 78/68 Map 74   Doppler 76   DLES is a "1".       INR is therapeutic at 2.9.   LDh is at baseline 192    TXP SHERRI INTERROGATIONS 12/6/2017   Type HeartMate3   Flow 3.9   Speed 5200   PI 3.7   Power ([place]) 3.5   LSL 4800   Pulsatility Pulse       HPI    66 y/o male with a PMHx of NICM (EF 10%) s/p HeartMate 3 Implanted 7/27/2017 as DT, HTN, DLD and recent ICD revision on 11/20/17 with Dr. Vidal (DC ICD placed with active RA/LV leads. RV lead capped during revision) that was complicated by pocket hematoma. He was discharged on 11/22/17 with plan to follow up in the [place] EP clinic in a week but had not had follow up yet given the recent thanksgiving holiday, admitted as a transfer after presenting to [place] with ICD shocks.  His ICD was reprogrammed in [facility] as the shocks for VT were inappropriate , rhythm was in fact Atrial Tachycardia. His INR was subtherapeutic and he was bridged with heparin. His hematoma was noted to be improving.       He returns her today for follow up . He states that he is doing well. No edema, no shortness of breath , no orthopnea. He states andrea tthe hematoma from device implantation has been stable, hasnt quite started to resolve but certainly not worse. No bleeding. No fever. Has had bowel movements daily, consistently , on miralax and colace. No dark , discolored stools     Review of Systems   Constitution: Negative for chills, decreased appetite, fever and weakness.   Cardiovascular: Negative for chest pain, dyspnea on exertion, irregular heartbeat, leg swelling, orthopnea, palpitations, paroxysmal nocturnal dyspnea and syncope.   Respiratory: Negative for cough, shortness of breath, sputum production " "and wheezing.    Skin: Negative for poor wound healing.   Gastrointestinal: Negative for bloating, abdominal pain, constipation, diarrhea, nausea and vomiting.   Psychiatric/Behavioral: Negative for altered mental status.       Objective:   Blood pressure (!) 76/0, temperature 98.3 °F (36.8 °C), height 5' 8" (1.727 m), weight 64.4 kg (142 lb).body mass index is 21.59 kg/m².        Physical Exam   Constitutional: He is oriented to person, place, and time. He appears well-developed and well-nourished.   HENT:   Head: Normocephalic and atraumatic.   Eyes: Pupils are equal, round, and reactive to light.   Neck: Neck supple. No JVD present. No tracheal deviation present.   Cardiovascular: Normal rate and regular rhythm.  Exam reveals no gallop and no friction rub.    No murmur heard.  LVAD hum    Pulmonary/Chest: Effort normal and breath sounds normal. No respiratory distress. He has no wheezes. He exhibits no tenderness.   Abdominal: Soft. Bowel sounds are normal. He exhibits no distension. There is no tenderness. There is no rebound and no guarding.   Musculoskeletal: He exhibits no edema.   Neurological: He is alert and oriented to person, place, and time.   Skin: Skin is warm and dry. No erythema.   Psychiatric: He has a normal mood and affect.       Lab Results   Component Value Date    WBC 6.45 12/01/2017    HGB 7.7 (L) 12/01/2017    HCT 24.5 (L) 12/01/2017    MCV 79 (L) 12/01/2017     12/01/2017    CO2 28 12/06/2017    CREATININE 0.9 12/06/2017    CALCIUM 9.0 12/06/2017    ALBUMIN 2.8 (L) 12/06/2017    AST 14 12/06/2017     (H) 12/06/2017    ALT 8 (L) 12/06/2017     12/06/2017       Lab Results   Component Value Date    INR 2.9 (H) 12/06/2017    INR 3.0 12/04/2017    INR 2.4 (H) 12/01/2017       BNP   Date Value Ref Range Status   12/06/2017 493 (H) 0 - 99 pg/mL Final     Comment:     Values of less than 100 pg/ml are consistent with non-CHF populations.   12/01/2017 485 (H) 0 - 99 pg/mL Final "     Comment:     Values of less than 100 pg/ml are consistent with non-CHF populations.   11/29/2017 1,127 (H) 0 - 99 pg/mL Final     Comment:     Values of less than 100 pg/ml are consistent with non-CHF populations.       LD   Date Value Ref Range Status   12/06/2017 192 110 - 260 U/L Final     Comment:     Results are increased in hemolyzed samples.   12/01/2017 160 110 - 260 U/L Final     Comment:     Results are increased in hemolyzed samples.   11/30/2017 180 110 - 260 U/L Final     Comment:     Results are increased in hemolyzed samples.      Bilirubin, Direct 0.1 - 0.3 mg/dL 0.3  0.4   0.4   0.6             Labs were reviewed with the patient.    No results found for this or any previous visit.  No results found for this or any previous visit.    Assessment:      1. Heart replaced by heart assist device        Plan:     Arden 5.6   Will observe , transfuse +/- IV Iron   Has been deemed not a transplant candidate previously for elevated PH and Hep B .   May need repeat eval of pulm pressures to see if these have decreased.     Supplies ordered, medically necessary     Patient is now NYHA I  Recommend 2 gram sodium restriction and 1500cc fluid restriction.  Encourage physical activity with graded exercise program.  Requested patient to weigh themselves daily, and to notify us if their weight increases by more than 3 lbs in 1 day or 5 lbs in 1 week.     Listed for transplant: No    UNOS Patient Status  Functional Status: 100% - Normal, no complaints, no evidence of disease  Physical Capacity: No Limitations  Working for Income: No  If no, reason not working: Patient Choice - Retired

## 2017-12-06 NOTE — PROGRESS NOTES
Date of Implant with Heartmate 3 LVAD: 17    PATIENT ARRIVED IN CLINIC:  Ambulatory   Accompanied by: wife    Vitals  Temperature, oral: 98.3  PAIN: 0NA on 0-10 pain scale, location of pain: na, description of pain: na  Is patient currently on medications for pain? no What kind? na  Does this help relieve the pain? na    VAD Interrogation:  TXP SHERRI INTERROGATIONS 2017   Type HeartMate3   Flow 3.9   Speed 5200   PI 3.7   Power ([place]) 3.5   LSL 4800   Pulsatility Pulse       Flow in history: 3-4  History [id number]  HCT:   Lab Results   Component Value Date    HCT 24.5 (L) 2017    HCT 29 (L) 2017         Problems / Issues / Alarms with VAD if any: None noted  Any Equipment Issues: None noted (Refer to  note for complete details)   Emergency Equipment With Patient: yes   VAD Binder With Patient: yes   Reviewed VAD Numbers In Binder: yes  VAD Sounds: HM3 sound Smooth  Manual & Visual Inspection Of Driveline: No kinks or tears noted    LVAD Dressing/DLES:  Appearance Of Driveline: 1  Antibiotics: NO  Velour: no  Frequency of Dressing Changes: daily & soap and water dressing  Stabilization Device In Use: yes, delgado securement device    Modular Cable Connection Intact: No yellow exposed  Pt In Need Of Management Kits?:Yes - 1 box soap and water ordered  It is medically necessary to have VAD management kits in order to prevent infection or to assist in the healing of an infected DLES.    Assessment:   Complaints/reason for visit today: routine  Complaints Of Nausea / Vomiting: None noted    Appearance and Frequency Of Stools: normal and formed without blood & daily  Color Of Urine: clear/yellow  Coping/Depression/Anxiety: coping okay  Sleep Habits: 8 or more hrs /night  Sleep Aids: None noted  Showering: No  Activity/Exercise: daily walking   Driving: No.    Labs:    Chemistry        Component Value Date/Time     2017 0954    K 3.9 2017 0954      12/06/2017 0954    CO2 28 12/06/2017 0954    BUN 19 12/06/2017 0954    CREATININE 0.9 12/06/2017 0954    GLU 93 12/06/2017 0954        Component Value Date/Time    CALCIUM 9.0 12/06/2017 0954    ALKPHOS 61 12/06/2017 0954    AST 14 12/06/2017 0954    ALT 8 (L) 12/06/2017 0954    BILITOT 0.5 12/06/2017 0954    ESTGFRAFRICA >60.0 12/06/2017 0954    EGFRNONAA >60.0 12/06/2017 0954            Magnesium   Date Value Ref Range Status   12/06/2017 2.0 1.6 - 2.6 mg/dL Final       Lab Results   Component Value Date    WBC 6.45 12/01/2017    HGB 7.7 (L) 12/01/2017    HCT 24.5 (L) 12/01/2017    MCV 79 (L) 12/01/2017     12/01/2017       Lab Results   Component Value Date    INR 2.9 (H) 12/06/2017    INR 3.0 12/04/2017    INR 2.4 (H) 12/01/2017       BNP   Date Value Ref Range Status   12/06/2017 493 (H) 0 - 99 pg/mL Final     Comment:     Values of less than 100 pg/ml are consistent with non-CHF populations.   12/01/2017 485 (H) 0 - 99 pg/mL Final     Comment:     Values of less than 100 pg/ml are consistent with non-CHF populations.   11/29/2017 1,127 (H) 0 - 99 pg/mL Final     Comment:     Values of less than 100 pg/ml are consistent with non-CHF populations.       LD   Date Value Ref Range Status   12/06/2017 192 110 - 260 U/L Final     Comment:     Results are increased in hemolyzed samples.   12/01/2017 160 110 - 260 U/L Final     Comment:     Results are increased in hemolyzed samples.   11/30/2017 180 110 - 260 U/L Final     Comment:     Results are increased in hemolyzed samples.       Labs reviewed with patient: YES      Patient Satisfaction Survey completed per patient: no  (explained about signature and box to check)  Medication reconciliation: per MA.  Purple card updated today: no  Coumadin Managed by: [facility], 2.9    Education: Reviewed driveline care, emergency procedures, how to change the controller, alarms with patient, as well as discussed how to page the VAD coordinator in case of an  emergency.      Plans/Needs: pt looks and feels good. Reports he is not constipated anymore since on daily colace and mirilax. Does not report black or tarry stools but H/H down to 5.6/18. Pt also reports hasn't been taking iron supplement secondary to causing constipation. No other issues.    Orders per Dr Torres: admit to hospital for low h/h (5.6/18).          Hurricane Season: No

## 2017-12-06 NOTE — PROGRESS NOTES
Wife was called and given lab result, wife reports that the Patient is being admitted to [facility] [place] due to low blood count and being given units of blood

## 2017-12-06 NOTE — PROGRESS NOTES
"Subjective:   Patient ID:  Suman Hayden is a 67 y.o. male who presents for LVAD followup visit.        Implant Date: 7/27/17  VAD Type : HM III    VAD speed is at 5200 rpm.   Implanted for : DT     No VAD alarms noted on interrogation occasional PI events .   BP is 78/68 Map 74   Doppler 76   DLES is a "1".       INR is therapeutic at 2.9.   LDh is at baseline 192    TXP SHERRI INTERROGATIONS 12/6/2017   Type HeartMate3   Flow 3.9   Speed 5200   PI 3.7   Power ([place]ca) 3.5   LSL 4800   Pulsatility Pulse       HPI    68 y/o male with a PMHx of NICM (EF 10%) s/p HeartMate 3 Implanted 7/27/2017 as DT, HTN, DLD and recent ICD revision on 11/20/17 with Dr. Vidal (DC ICD placed with active RA/LV leads. RV lead capped during revision) that was complicated by pocket hematoma. He was discharged on 11/22/17 with plan to follow up in the [place] EP clinic in a week but had not had follow up yet given the recent thanksgiving holiday, admitted as a transfer after presenting to [facility] with ICD shocks.  His ICD was reprogrammed in [facility] [place] as the shocks for VT were inappropriate , rhythm was in fact Atrial Tachycardia. His INR was subtherapeutic and he was bridged with heparin. His hematoma was noted to be improving.       He returns her today for follow up . He states that he is doing well. No edema, no shortness of breath , no orthopnea. He states anrdea tthe hematoma from device implantation has been stable, hasnt quite started to resolve but certainly not worse. No bleeding. No fever. Has had bowel movements daily, consistently , on miralax and colace. No dark , discolored stools     Review of Systems   Constitution: Negative for chills, decreased appetite, fever and weakness.   Cardiovascular: Negative for chest pain, dyspnea on exertion, irregular heartbeat, leg swelling, orthopnea, palpitations, paroxysmal nocturnal dyspnea and syncope.   Respiratory: Negative for cough, shortness of breath, sputum production " "and wheezing.    Skin: Negative for poor wound healing.   Gastrointestinal: Negative for bloating, abdominal pain, constipation, diarrhea, nausea and vomiting.   Psychiatric/Behavioral: Negative for altered mental status.       Objective:   Blood pressure (!) 76/0, temperature 98.3 °F (36.8 °C), height 5' 8" (1.727 m), weight 64.4 kg (142 lb).body mass index is 21.59 kg/m².        Physical Exam   Constitutional: He is oriented to person, place, and time. He appears well-developed and well-nourished.   HENT:   Head: Normocephalic and atraumatic.   Eyes: Pupils are equal, round, and reactive to light.   Neck: Neck supple. No JVD present. No tracheal deviation present.   Cardiovascular: Normal rate and regular rhythm.  Exam reveals no gallop and no friction rub.    No murmur heard.  LVAD hum    Pulmonary/Chest: Effort normal and breath sounds normal. No respiratory distress. He has no wheezes. He exhibits no tenderness.   Abdominal: Soft. Bowel sounds are normal. He exhibits no distension. There is no tenderness. There is no rebound and no guarding.   Musculoskeletal: He exhibits no edema.   Neurological: He is alert and oriented to person, place, and time.   Skin: Skin is warm and dry. No erythema.   Psychiatric: He has a normal mood and affect.       Lab Results   Component Value Date    WBC 6.45 12/01/2017    HGB 7.7 (L) 12/01/2017    HCT 24.5 (L) 12/01/2017    MCV 79 (L) 12/01/2017     12/01/2017    CO2 28 12/06/2017    CREATININE 0.9 12/06/2017    CALCIUM 9.0 12/06/2017    ALBUMIN 2.8 (L) 12/06/2017    AST 14 12/06/2017     (H) 12/06/2017    ALT 8 (L) 12/06/2017     12/06/2017       Lab Results   Component Value Date    INR 2.9 (H) 12/06/2017    INR 3.0 12/04/2017    INR 2.4 (H) 12/01/2017       BNP   Date Value Ref Range Status   12/06/2017 493 (H) 0 - 99 pg/mL Final     Comment:     Values of less than 100 pg/ml are consistent with non-CHF populations.   12/01/2017 485 (H) 0 - 99 pg/mL Final "     Comment:     Values of less than 100 pg/ml are consistent with non-CHF populations.   11/29/2017 1,127 (H) 0 - 99 pg/mL Final     Comment:     Values of less than 100 pg/ml are consistent with non-CHF populations.       LD   Date Value Ref Range Status   12/06/2017 192 110 - 260 U/L Final     Comment:     Results are increased in hemolyzed samples.   12/01/2017 160 110 - 260 U/L Final     Comment:     Results are increased in hemolyzed samples.   11/30/2017 180 110 - 260 U/L Final     Comment:     Results are increased in hemolyzed samples.      Bilirubin, Direct 0.1 - 0.3 mg/dL 0.3  0.4   0.4   0.6             Labs were reviewed with the patient.    No results found for this or any previous visit.  No results found for this or any previous visit.    Assessment:      1. Heart replaced by heart assist device        Plan:     Arden 5.6   Will observe , transfuse +/- IV Iron   Has been deemed not a transplant candidate previously for elevated PH and Hep B .   May need repeat eval of pulm pressures to see if these have decreased.     Supplies ordered, medically necessary     Patient is now NYHA I  Recommend 2 gram sodium restriction and 1500cc fluid restriction.  Encourage physical activity with graded exercise program.  Requested patient to weigh themselves daily, and to notify us if their weight increases by more than 3 lbs in 1 day or 5 lbs in 1 week.     Listed for transplant: No    UNOS Patient Status  Functional Status: 100% - Normal, no complaints, no evidence of disease  Physical Capacity: No Limitations  Working for Income: No  If no, reason not working: Patient Choice - Retired

## 2017-12-07 PROBLEM — D64.9 NORMOCYTIC ANEMIA: Status: ACTIVE | Noted: 2017-01-01

## 2017-12-07 NOTE — SUBJECTIVE & OBJECTIVE
Past Medical History:   Diagnosis Date    Cardiomyopathy     Hyperlipidemia     Hypertension     Obesity     S/P implantation of automatic cardioverter/defibrillator (AICD)     Ventricular tachycardia        Past Surgical History:   Procedure Laterality Date    CARDIAC CATHETERIZATION      CARDIAC DEFIBRILLATOR PLACEMENT      LEFT VENTRICULAR ASSIST DEVICE  07/27/2017       Review of patient's allergies indicates:  No Known Allergies  Family History     Problem Relation (Age of Onset)    Heart disease Mother, Father        Social History Main Topics    Smoking status: Never Smoker    Smokeless tobacco: Never Used    Alcohol use No    Drug use: Unknown    Sexual activity: Not on file     Review of Systems   Constitutional: Negative for activity change, appetite change, chills, fatigue and fever.   HENT: Negative for trouble swallowing.    Eyes: Negative.    Respiratory: Negative.    Cardiovascular: Negative.    Gastrointestinal: Negative.    Endocrine: Negative.    Genitourinary: Negative.    Neurological: Negative.      Objective:     Vital Signs (Most Recent):  Temp: 98.7 °F (37.1 °C) (12/07/17 0330)  Pulse: 79 (12/07/17 0713)  Resp: 18 (12/07/17 0330)  BP: (!) 82/0 (12/07/17 0330)  SpO2: 99 % (12/07/17 0330) Vital Signs (24h Range):  Temp:  [98.1 °F (36.7 °C)-98.8 °F (37.1 °C)] 98.7 °F (37.1 °C)  Pulse:  [78-90] 79  Resp:  [16-18] 18  SpO2:  [98 %-99 %] 99 %  BP: (74-92)/(0-68) 82/0     Weight: 64.5 kg (142 lb 3.2 oz) (12/06/17 1530)  Body mass index is 21.62 kg/m².      Intake/Output Summary (Last 24 hours) at 12/07/17 0843  Last data filed at 12/07/17 0600   Gross per 24 hour   Intake            657.5 ml   Output             1425 ml   Net           -767.5 ml       Lines/Drains/Airways     Line                 VAD 07/27/17 1312 Left ventricular assist device HeartMate 3 132 days          Peripheral Intravenous Line                 Peripheral IV - Single Lumen 12/06/17 1905 Right Forearm less than 1  day                Physical Exam   Constitutional: He is oriented to person, place, and time. No distress.   HENT:   Head: Normocephalic and atraumatic.   Eyes: No scleral icterus.   Neck: Normal range of motion.   Cardiovascular: Normal rate.    LVAD hum present   Pulmonary/Chest: Effort normal and breath sounds normal.   Abdominal: Soft. Bowel sounds are normal. He exhibits no distension and no mass. There is no tenderness. There is no rebound and no guarding. No hernia.   CAMDEN with no hemorrhoids, dark brown stools no overt melena or BRB   Musculoskeletal: Normal range of motion. He exhibits no edema.   Neurological: He is alert and oriented to person, place, and time.   Skin: He is not diaphoretic.       Significant Labs:  CBC:   Recent Labs  Lab 12/06/17 0954 12/07/17 0419   WBC 8.00 11.44   HGB 5.6* 7.5*   HCT 18.0* 23.2*    263     CMP:   Recent Labs  Lab 12/06/17 0954 12/07/17 0419   GLU 93 74   CALCIUM 9.0 8.7   ALBUMIN 2.8*  --    PROT 6.5  --     141   K 3.9 3.8   CO2 28 29    106   BUN 19 28*   CREATININE 0.9 0.9   ALKPHOS 61  --    ALT 8*  --    AST 14  --    BILITOT 0.5  --      Coagulation:   Recent Labs  Lab 12/07/17 0419   INR 2.1*   APTT 28.2       Significant Imaging:  Imaging results within the past 24 hours have been reviewed.

## 2017-12-07 NOTE — PROCEDURES
Patient no complaints this morning.  Wife at bedside. VAD interrogation completed this AM in the event changes needed to be made. Will continue to monitor for further issues.     Pulsatile: Yes, intermittent   VAD Sounds: HM3  Smooth  Problems / Issues / Alarms with VAD if any: PI events  HCT: 23.2    VAD Interrogation:  TXP LVAD INTERROGATIONS 12/7/2017 12/7/2017 12/6/2017 12/6/2017 12/6/2017 12/1/2017 12/1/2017   Type HeartMate3 HeartMate3 HeartMate3 HeartMate3 - HeartMate3 (No Data)   Flow 3.9 4.0 3.8 3.7 - 4.2 -   Speed 5200 5200 5200 5200 - 5200 -   PI 3.9 3.9 4.0 4.4 - 3.2 -   Power ([place]ca) 3.5 3.5 3.5 3.5 - 3.6 -   LSL - 4800 4800 - - 4800 -   Pulsatility Intermittent pulse No Pulse Pulse - Pulse - -   Some recent data might be hidden   }

## 2017-12-07 NOTE — ASSESSMENT & PLAN NOTE
-HeartMate 3 Implanted 7/27/17 as DT  -Will boost Coumadin today, Goal INR 2.0-3.0. Therapeutic today; however it was held yesterday on admission .   -Antiplatelets: will restart ASA today; it was held yesterday in setting of low H/H, but etiology of anemia may not be GI loss   -LDH is stable overall today. Will continue to monitor daily.  -Speed set at 5200 rpm  -Interrogation notable for PI events  -Not listed for OHTx

## 2017-12-07 NOTE — ASSESSMENT & PLAN NOTE
-s/p transfusion of 2 units with appropriate rise  -Appreciate GI consultation; bedside FOBT negative  -Will continue PPI (change to oral) and monitor patient overnight and will plan for a double (EGD and colonoscopy) as an outpatient.  -Will give IV iron today.  Patient was on oral iron previously but failed high doses secondary to nausea, vomiting and constipation   -If H/H remains stable and INR therapeutic; will plan discharge tomorrow

## 2017-12-07 NOTE — ASSESSMENT & PLAN NOTE
67 year old male with a history of HTN, HLD CKD Stage 3, and NCIM s/p LVAD on 7/27/17 sent to the hospital after CBC revealed a hemoglobin of 5.6. GI consulted for a possible GI bleed.  Although not report over GI bleed patient does have a history of prior GI and is an LVAD patient on AC therefore would recommend a double (EGD and colonoscopy) as an outpatient.    Recommendations:  Continue to trend H/H and transfuse as needed  Continue Protonix Qdaily  EGD and colonoscopy as an outpatient (order to be placed by GI)

## 2017-12-07 NOTE — PROGRESS NOTES
[facility]  Heart Transplant  Progress Note    Patient Name: Suman Hayden  MRN: [id number]  Admission Date: 12/6/2017  Hospital Length of Stay: 1 days  Attending Physician: Jin Franklin MD  Primary Care Provider: Joe Ernst MD  Principal Problem:<principal problem not specified>    Subjective:     Interval History: No new complaints this morning.  He is frustrated about being in the hospital.  He is eager to go home.  Wife at bedside.  He denies dark stools, changed in bowel habits, blood loss or any other complaints    Continuous Infusions:   Scheduled Meds:   amLODIPine  10 mg Oral Daily    ferric gluconate (FERRLECIT) IVPB  250 mg Intravenous BID    furosemide  40 mg Oral BID    hydrALAZINE  50 mg Oral Q8H    magnesium oxide  400 mg Oral BID    [START ON 12/8/2017] pantoprazole  40 mg Oral Daily    pravastatin  20 mg Oral QHS    warfarin  4 mg Oral Once     PRN Meds:sodium chloride, ALPRAZolam    Review of patient's allergies indicates:  No Known Allergies  Objective:     Vital Signs (Most Recent):  Temp: 98.4 °F (36.9 °C) (12/07/17 0856)  Pulse: (!) 150 (12/07/17 1126)  Resp: 16 (12/07/17 0856)  BP: (!) 80/0 (12/07/17 0901)  SpO2: 96 % (12/07/17 0856) Vital Signs (24h Range):  Temp:  [98.1 °F (36.7 °C)-98.8 °F (37.1 °C)] 98.4 °F (36.9 °C)  Pulse:  [] 150  Resp:  [16-18] 16  SpO2:  [96 %-99 %] 96 %  BP: (74-98)/(0-66) 80/0     Patient Vitals for the past 72 hrs (Last 3 readings):   Weight   12/07/17 0700 62.2 kg (137 lb 2 oz)   12/06/17 1530 64.5 kg (142 lb 3.2 oz)     Body mass index is 20.85 kg/m².      Intake/Output Summary (Last 24 hours) at 12/07/17 1142  Last data filed at 12/07/17 0600   Gross per 24 hour   Intake            657.5 ml   Output             1425 ml   Net           -767.5 ml     Physical Exam  Constitutional: laying in bed NAD, wife at bedside  Neck: Neck supple. No JVD present. No tracheal deviation present.   Cardiovascular: smooth VAD hum.   Exam reveals no gallop and no friction rub.    Pulmonary/Chest: CTA  Abdominal: Soft. Bowel sounds are normal. He exhibits no distension. There is no tenderness. There is no rebound and no guarding.   Musculoskeletal: He exhibits no edema.   Neurological: He is alert and oriented to person, place, and time.   Skin: Skin is warm and dry. No erythema.   Psychiatric: He has a normal mood and affect.     Significant Labs:  CBC:    Recent Labs  Lab 12/01/17 0602 12/06/17  0954 12/07/17 0419   WBC 6.45 8.00 11.44   RBC 3.12* 2.22* 2.82*   HGB 7.7* 5.6* 7.5*   HCT 24.5* 18.0* 23.2*    290 263   MCV 79* 81* 82   MCH 24.7* 25.2* 26.6*   MCHC 31.4* 31.1* 32.3     BNP:    Recent Labs  Lab 12/01/17 0602 12/06/17 0954   * 493*     CMP:    Recent Labs  Lab 12/01/17 0602 12/06/17  0954 12/07/17 0419   GLU 94 93 74   CALCIUM 9.1 9.0 8.7   ALBUMIN 2.8* 2.8*  --    PROT 6.7 6.5  --     139 141   K 3.9 3.9 3.8   CO2 29 28 29    104 106   BUN 17 19 28*   CREATININE 1.1 0.9 0.9   ALKPHOS 60 61  --    ALT 8* 8*  --    AST 15 14  --    BILITOT 0.6 0.5  --       Coagulation:     Recent Labs  Lab 12/04/17 12/06/17  0954 12/07/17 0419   INR 3.0 2.9* 2.1*   APTT  --   --  28.2     LDH:    Recent Labs  Lab 12/06/17  0954 12/07/17  0419    188     Microbiology:  Microbiology Results (last 7 days)     ** No results found for the last 168 hours. **          I have reviewed all pertinent labs within the past 24 hours.    Estimated Creatinine Clearance: 70.1 mL/min (based on SCr of 0.9 mg/dL).    Diagnostic Results:  I have reviewed all pertinent imaging results/findings within the past 24 hours.    Assessment and Plan:     No notes on file    LVAD (left ventricular assist device) present    -HeartMate 3 Implanted 7/27/17 as DT  -Will boost Coumadin today, Goal INR 2.0-3.0. Therapeutic today; however it was held yesterday on admission .   -Antiplatelets: will restart ASA today; it was held yesterday in  setting of low H/H, but etiology of anemia may not be GI loss   -LDH is stable overall today. Will continue to monitor daily.  -Speed set at 5200 rpm  -Interrogation notable for PI events  -Not listed for OHTx          Normocytic anemia    -s/p transfusion of 2 units with appropriate rise  -Appreciate GI consultation; bedside FOBT negative  -Will continue PPI (change to oral) and monitor patient overnight and will plan for a double (EGD and colonoscopy) as an outpatient.  -Will give IV iron today.  Patient was on oral iron previously but failed high doses secondary to nausea, vomiting and constipation   -If H/H remains stable and INR therapeutic; will plan discharge tomorrow         Essential hypertension    -restarted home regimen; will monitor response        Atrial tachycardia    -on telemetry; no events overnight             MELISSA Jon  Heart Transplant  [facility]

## 2017-12-07 NOTE — PLAN OF CARE
Problem: Patient Care Overview  Goal: Plan of Care Review  Outcome: Ongoing (interventions implemented as appropriate)  Patient remains free of falls or injury. Patient denies pain. 2 units PRBCs given this shift; will recheck CBC in AM. Holding coumadin at this time. GI consulted to identify source of bleeding. Plan of care reviewed with patient and wife.

## 2017-12-07 NOTE — SUBJECTIVE & OBJECTIVE
Interval History: No new complaints this morning.  He is frustrated about being in the hospital.  He is eager to go home.  Wife at bedside.  He denies dark stools, changed in bowel habits, blood loss or any other complaints    Continuous Infusions:   Scheduled Meds:   amLODIPine  10 mg Oral Daily    ferric gluconate (FERRLECIT) IVPB  250 mg Intravenous BID    furosemide  40 mg Oral BID    hydrALAZINE  50 mg Oral Q8H    magnesium oxide  400 mg Oral BID    [START ON 12/8/2017] pantoprazole  40 mg Oral Daily    pravastatin  20 mg Oral QHS    warfarin  4 mg Oral Once     PRN Meds:sodium chloride, ALPRAZolam    Review of patient's allergies indicates:  No Known Allergies  Objective:     Vital Signs (Most Recent):  Temp: 98.4 °F (36.9 °C) (12/07/17 0856)  Pulse: (!) 150 (12/07/17 1126)  Resp: 16 (12/07/17 0856)  BP: (!) 80/0 (12/07/17 0901)  SpO2: 96 % (12/07/17 0856) Vital Signs (24h Range):  Temp:  [98.1 °F (36.7 °C)-98.8 °F (37.1 °C)] 98.4 °F (36.9 °C)  Pulse:  [] 150  Resp:  [16-18] 16  SpO2:  [96 %-99 %] 96 %  BP: (74-98)/(0-66) 80/0     Patient Vitals for the past 72 hrs (Last 3 readings):   Weight   12/07/17 0700 62.2 kg (137 lb 2 oz)   12/06/17 1530 64.5 kg (142 lb 3.2 oz)     Body mass index is 20.85 kg/m².      Intake/Output Summary (Last 24 hours) at 12/07/17 1142  Last data filed at 12/07/17 0600   Gross per 24 hour   Intake            657.5 ml   Output             1425 ml   Net           -767.5 ml     Physical Exam  Constitutional: laying in bed NAD, wife at bedside  Neck: Neck supple. No JVD present. No tracheal deviation present.   Cardiovascular: smooth VAD hum.  Exam reveals no gallop and no friction rub.    Pulmonary/Chest: CTA  Abdominal: Soft. Bowel sounds are normal. He exhibits no distension. There is no tenderness. There is no rebound and no guarding.   Musculoskeletal: He exhibits no edema.   Neurological: He is alert and oriented to person, place, and time.   Skin: Skin is warm and  dry. No erythema.   Psychiatric: He has a normal mood and affect.     Significant Labs:  CBC:    Recent Labs  Lab 12/01/17 0602 12/06/17  0954 12/07/17  0419   WBC 6.45 8.00 11.44   RBC 3.12* 2.22* 2.82*   HGB 7.7* 5.6* 7.5*   HCT 24.5* 18.0* 23.2*    290 263   MCV 79* 81* 82   MCH 24.7* 25.2* 26.6*   MCHC 31.4* 31.1* 32.3     BNP:    Recent Labs  Lab 12/01/17  0602 12/06/17  0954   * 493*     CMP:    Recent Labs  Lab 12/01/17 0602 12/06/17  0954 12/07/17  0419   GLU 94 93 74   CALCIUM 9.1 9.0 8.7   ALBUMIN 2.8* 2.8*  --    PROT 6.7 6.5  --     139 141   K 3.9 3.9 3.8   CO2 29 28 29    104 106   BUN 17 19 28*   CREATININE 1.1 0.9 0.9   ALKPHOS 60 61  --    ALT 8* 8*  --    AST 15 14  --    BILITOT 0.6 0.5  --       Coagulation:     Recent Labs  Lab 12/04/17 12/06/17  0954 12/07/17 0419   INR 3.0 2.9* 2.1*   APTT  --   --  28.2     LDH:    Recent Labs  Lab 12/06/17  0954 12/07/17  0419    188     Microbiology:  Microbiology Results (last 7 days)     ** No results found for the last 168 hours. **          I have reviewed all pertinent labs within the past 24 hours.    Estimated Creatinine Clearance: 70.1 mL/min (based on SCr of 0.9 mg/dL).    Diagnostic Results:  I have reviewed all pertinent imaging results/findings within the past 24 hours.

## 2017-12-07 NOTE — HPI
67 year old male with a history of HTN, HLD CKD Stage 3, and NCIM s/p LVAD on 7/27/17 sent to the hospital after CBC revealed a hemoglobin of 5.6. GI consulted for a possible GI bleed.    History provided by patient.   Patient states that he was instructed to come to the hospital as hemoglobin was 5.6 and INR was 2.9.  At home he is on ASA (81 mg POI Qdaily), Coumadin (2 mg PO Qdaily), as well as a PPI daily.  He denies any chest pain, shortness of breath, or palpitations  He denies any nausea, emesis, abdominal pain, melena or BRBPR.  He denies additional NSAID use, Iron supplements or pepto.    Of note he does have a left chest wall hematoma which is impressive but per wife stable in size.  He does have a history of a GI bleed during a prior admission. He had an EGD at that time on 8/17/17 which showed LA grade D esophagitis with clots in the entire stomach with hemorrhagic mucosa.

## 2017-12-07 NOTE — CONSULTS
[facility]  Gastroenterology  Consult Note    Patient Name: Suman Hayden  MRN: [id number]  Admission Date: 12/6/2017  Hospital Length of Stay: 1 days  Code Status: Full Code   Attending Provider: Jin Franklin MD   Consulting Provider: Maday Hood MD  Primary Care Physician: Joe Ernst MD  Principal Problem:<principal problem not specified>    Inpatient consult to Gastroenterology  Consult performed by: MADAY HOOD  Consult ordered by: MARY ELLEN GARCIA        Subjective:     HPI:  67 year old male with a history of HTN, HLD CKD Stage 3, and NCIM s/p LVAD on 7/27/17 sent to the hospital after CBC revealed a hemoglobin of 5.6. GI consulted for a possible GI bleed.    History provided by patient.   Patient states that he was instructed to come to the hospital as hemoglobin was 5.6 and INR was 2.9.  At home he is on ASA (81 mg POI Qdaily), Coumadin (2 mg PO Qdaily), as well as a PPI daily.  He denies any chest pain, shortness of breath, or palpitations  He denies any nausea, emesis, abdominal pain, melena or BRBPR.  He denies additional NSAID use, Iron supplements or pepto.    Of note he does have a left chest wall hematoma which is impressive but per wife stable in size.  He does have a history of a GI bleed during a prior admission. He had an EGD at that time on 8/17/17 which showed LA grade D esophagitis with clots in the entire stomach with hemorrhagic mucosa.      Past Medical History:   Diagnosis Date    Cardiomyopathy     Hyperlipidemia     Hypertension     Obesity     S/P implantation of automatic cardioverter/defibrillator (AICD)     Ventricular tachycardia        Past Surgical History:   Procedure Laterality Date    CARDIAC CATHETERIZATION      CARDIAC DEFIBRILLATOR PLACEMENT      LEFT VENTRICULAR ASSIST DEVICE  07/27/2017       Review of patient's allergies indicates:  No Known Allergies  Family History     Problem Relation (Age of Onset)    Heart disease  Mother, Father        Social History Main Topics    Smoking status: Never Smoker    Smokeless tobacco: Never Used    Alcohol use No    Drug use: Unknown    Sexual activity: Not on file     Review of Systems   Constitutional: Negative for activity change, appetite change, chills, fatigue and fever.   HENT: Negative for trouble swallowing.    Eyes: Negative.    Respiratory: Negative.    Cardiovascular: Negative.    Gastrointestinal: Negative.    Endocrine: Negative.    Genitourinary: Negative.    Neurological: Negative.      Objective:     Vital Signs (Most Recent):  Temp: 98.7 °F (37.1 °C) (12/07/17 0330)  Pulse: 79 (12/07/17 0713)  Resp: 18 (12/07/17 0330)  BP: (!) 82/0 (12/07/17 0330)  SpO2: 99 % (12/07/17 0330) Vital Signs (24h Range):  Temp:  [98.1 °F (36.7 °C)-98.8 °F (37.1 °C)] 98.7 °F (37.1 °C)  Pulse:  [78-90] 79  Resp:  [16-18] 18  SpO2:  [98 %-99 %] 99 %  BP: (74-92)/(0-68) 82/0     Weight: 64.5 kg (142 lb 3.2 oz) (12/06/17 1530)  Body mass index is 21.62 kg/m².      Intake/Output Summary (Last 24 hours) at 12/07/17 0843  Last data filed at 12/07/17 0600   Gross per 24 hour   Intake            657.5 ml   Output             1425 ml   Net           -767.5 ml       Lines/Drains/Airways     Line                 VAD 07/27/17 1312 Left ventricular assist device HeartMate 3 132 days          Peripheral Intravenous Line                 Peripheral IV - Single Lumen 12/06/17 1905 Right Forearm less than 1 day                Physical Exam   Constitutional: He is oriented to person, place, and time. No distress.   HENT:   Head: Normocephalic and atraumatic.   Eyes: No scleral icterus.   Neck: Normal range of motion.   Cardiovascular: Normal rate.    LVAD hum present   Pulmonary/Chest: Effort normal and breath sounds normal.   Abdominal: Soft. Bowel sounds are normal. He exhibits no distension and no mass. There is no tenderness. There is no rebound and no guarding. No hernia.   [place] with no hemorrhoids, dark brown  stools no overt melena or BRB   Musculoskeletal: Normal range of motion. He exhibits no edema.   Neurological: He is alert and oriented to person, place, and time.   Skin: He is not diaphoretic.       Significant Labs:  CBC:   Recent Labs  Lab 12/06/17  0954 12/07/17  0419   WBC 8.00 11.44   HGB 5.6* 7.5*   HCT 18.0* 23.2*    263     CMP:   Recent Labs  Lab 12/06/17  0954 12/07/17 0419   GLU 93 74   CALCIUM 9.0 8.7   ALBUMIN 2.8*  --    PROT 6.5  --     141   K 3.9 3.8   CO2 28 29    106   BUN 19 28*   CREATININE 0.9 0.9   ALKPHOS 61  --    ALT 8*  --    AST 14  --    BILITOT 0.5  --      Coagulation:   Recent Labs  Lab 12/07/17 0419   INR 2.1*   APTT 28.2       Significant Imaging:  Imaging results within the past 24 hours have been reviewed.    Assessment/Plan:     Normocytic anemia    67 year old male with a history of HTN, HLD CKD Stage 3, and NCIM s/p LVAD on 7/27/17 sent to the hospital after CBC revealed a hemoglobin of 5.6. GI consulted for a possible GI bleed.  Although not report over GI bleed patient does have a history of prior GI and is an LVAD patient on AC therefore would recommend a double (EGD and colonoscopy) as an outpatient.    Recommendations:  Continue to trend H/H and transfuse as needed  Continue Protonix Qdaily  EGD and colonoscopy as an outpatient (order to be placed by GI)            Thank you for your consult. I will follow-up with patient. Please contact us if you have any additional questions.     Dale Puga M.D.  Gastroenterology Fellow, PGY-IV  Pager: [phone]  [facility]

## 2017-12-07 NOTE — PLAN OF CARE
[facility]   Heart Transplant/V[facility] Clinic   [address] [place] [address]   [phone] after hours          [phone] fax     VAD HOME  HEALTH ORDERS      Admit to Home Health    Diagnosis:   Patient Active Problem List   Diagnosis    Nonischemic cardiomyopathy    Encounter for monitoring amiodarone therapy    Essential hypertension    Hyperlipidemia    V-tach    Elevated PSA    Hepatitis B core antibody positive since 2012    Chronic systolic congestive heart failure    Heart transplant candidate    Hyperbilirubinemia    Malfunction of implantable cardioverter-defibrillator (ICD) electrode    Syncope and collapse    Atrial tachycardia    Hyperglycemia    AICD (automatic cardioverter/defibrillator) present    LVAD (left ventricular assist device) present    Atrial fibrillation    Heart replaced by heart assist device    Anticoagulation monitoring, INR range 2-3    CHF (congestive heart failure)    Defibrillator discharge    ICD (implantable cardioverter-defibrillator) pocket hematoma    Subtherapeutic international normalized ratio (INR)    Constipation    Normocytic anemia       Patient is homebound due to:      Diet: Low Fat, Low cholesterol, 2Gm Na, Coumadin restrictions.    Acitivities: No Swimming, bathing, vacuuming, contact sports.    Fresh implants= Sternal Precautions    Nursing:   SN to complete comprehensive assessment including routine vital signs. Instruct on disease process and s/s of complications to report to MD. Review/verify medication list sent home with the patient at time of discharge  and instruct patient/caregiver as needed. Frequency may be adjusted depending on start of care date.    **LVAD driveline exit site dressing change is to be completed per LVAD patient/caregiver only**.    Notify MD if SBP > 160 or < 90; DBP > 90 or < 50; HR > 120 or < 50; Temp > 101; Weight gain >3lbs in 1 day or 5lbs in 1  week.  Other:       LABS:  SN to perform labs: PT/INR per Coumadin clinic [phone].   Follow up INR date: 12/11/17  No Finger Sticks      CONSULTS:     Aide to provide assistance with personal care, ADLs, and vital signs    Send initial Home Health orders to [facility]TS attending physician on call.  Send follow up questions to VAD clinic MD [phone] or [phone].

## 2017-12-07 NOTE — TELEPHONE ENCOUNTER
12/07/2017  8:56 AM    Patient needs an outpatient EGD and colonoscopy as soon as possible.    He is on AC and has an LVAD therefore should be done on the 2nd floor.    Dale Puga M.D.  Gastroenterology Fellow, PGY-IV  Pager: [phone]  [facility]

## 2017-12-07 NOTE — PROGRESS NOTES
12/07/17 1125 12/07/17 1126   Vital Signs   Pulse (!) 123 (!) 150   Heart Rate Source Monitor Monitor   patient HR sustaining 120-150s. STAT EKG ordered. MELISSA Nunez updated.

## 2017-12-08 PROBLEM — D64.9 ANEMIA: Status: ACTIVE | Noted: 2017-01-01

## 2017-12-08 NOTE — TREATMENT PLAN
Treatment Plan  12/08/2017  9:30 AM    Spoke to team as patient had 1 episode of melena this morning with H/H trending down.  Keep NPO for EGD today if negative VCE on Monday.    Dale Puga M.D.  Gastroenterology Fellow, PGY-IV  Pager: [phone]  [facility]

## 2017-12-08 NOTE — PROGRESS NOTES
Called into room pt was diaphoretic and vomiting. It has subsided. Pt's DP 80/0, , remaining vitals stable as well. Notified MELISSA Nunez, PA ordered Zofran 8 mg Q 8 PRN for nausea. Blood released.  Will continue to monitor very closely.

## 2017-12-08 NOTE — ANESTHESIA PREPROCEDURE EVALUATION
12/08/2017  Suman Hayden is a 67 y.o., male.    Anesthesia Evaluation         Review of Systems      Physical Exam  General:  Malnutrition    Airway/Jaw/Neck:  Airway Findings: Mouth Opening: Normal Tongue: Normal  General Airway Assessment: Adult  Mallampati: II  Improves to II with phonation.  TM Distance: Normal, at least 6 cm      Dental:  Dental Findings: Periodontal disease, Severe   Chest/Lungs:  Chest/Lungs Findings: Clear to auscultation     Heart/Vascular:  Heart Findings: Rate: Normal  Rhythm: Regular Rhythm        Mental Status:  Mental Status Findings:  Lethargic         Anesthesia Plan  Type of Anesthesia, risks & benefits discussed:  Anesthesia Type:  general  Patient's Preference:   Intra-op Monitoring Plan:   Intra-op Monitoring Plan Comments:   Post Op Pain Control Plan: multimodal analgesia  Post Op Pain Control Plan Comments:   Induction:   IV  Beta Blocker:  Patient is not currently on a Beta-Blocker (No further documentation required).       Informed Consent:  Anesthesia consent signed with patient.  ASA Score: 3     Day of Surgery Review of History & Physical:    H&P update referred to the surgeon.         Ready For Surgery From Anesthesia Perspective.

## 2017-12-08 NOTE — TREATMENT PLAN
Treatment Plan  12/07/2017  7:20 PM    Upon seeing patient in the afternoon with attending. Patient wife remembered that patient had a prior EGD, VCE, and colonoscopy. Will try to find and review to determine if this would . Further recs in the morning.    Dale Puga M.D.  Gastroenterology Fellow, PGY-IV  Pager: [phone]  [facility]

## 2017-12-08 NOTE — ANESTHESIA RELEASE NOTE
"Anesthesia Release from PACU Note    Patient: Suman Hayden    Procedure(s) Performed: Procedure(s) (LRB):  ESOPHAGOGASTRODUODENOSCOPY (EGD) (N/A)    Anesthesia type: general    Post pain: Adequate analgesia    Post assessment: no apparent anesthetic complications, tolerated procedure well and no evidence of recall    Last Vitals:   Visit Vitals  /83 (BP Location: Left arm, Patient Position: Lying)   Pulse 81   Temp 36.5 °C (97.7 °F) (Skin)   Resp 19   Ht 5' 8" (1.727 m)   Wt 62 kg (136 lb 11 oz)   SpO2 100%   BMI 20.78 kg/m²       Post vital signs: stable    Level of consciousness: awake, alert  and oriented    Nausea/Vomiting: no nausea/no vomiting    Complications: none    Airway Patency: patent    Respiratory: unassisted    Cardiovascular: stable and blood pressure at baseline    Hydration: euvolemic  "

## 2017-12-08 NOTE — ANESTHESIA POSTPROCEDURE EVALUATION
"Anesthesia Post Evaluation    Patient: Suman Hayden    Procedure(s) Performed: Procedure(s) (LRB):  ESOPHAGOGASTRODUODENOSCOPY (EGD) (N/A)    Final Anesthesia Type: general  Patient location during evaluation: GI PACU  Patient participation: Yes- Able to Participate  Level of consciousness: awake and alert and oriented  Post-procedure vital signs: reviewed and stable  Pain management: adequate  Airway patency: patent  PONV status at discharge: No PONV  Anesthetic complications: no      Cardiovascular status: blood pressure returned to baseline  Respiratory status: unassisted, spontaneous ventilation and room air  Hydration status: euvolemic  Follow-up not needed.        Visit Vitals  /83 (BP Location: Left arm, Patient Position: Lying)   Pulse 81   Temp 36.5 °C (97.7 °F) (Skin)   Resp 19   Ht 5' 8" (1.727 m)   Wt 62 kg (136 lb 11 oz)   SpO2 100%   BMI 20.78 kg/m²       Pain/Gurpreet Score: Pain Assessment Performed: Yes (12/8/2017  2:36 PM)  Presence of Pain: denies (12/8/2017  2:36 PM)  Gurpreet Score: 9 (12/8/2017  2:35 PM)      "

## 2017-12-08 NOTE — ASSESSMENT & PLAN NOTE
-s/p transfusion of 2 units with appropriate rise; however drop in H/H again today.  Will transfuse another two units   -Appreciate GI consultation; plan was for outpatient study; however will proceed with EGD today given drop this morning.  If negative then will plan for VCE on Monday   -Will continue PPI   -Patient given IV iron yesterday; had failed oral iron at home at high doses secondary to nausea and constipation

## 2017-12-08 NOTE — PROGRESS NOTES
Pt reported reaction to IV iron as per pt. He previously had the same reaction of diaphoresis, severe headache and low grad temp.     Plan:   - Ordered CT head without contrast.   - IV Benadryl and IV Solumedrol

## 2017-12-08 NOTE — SUBJECTIVE & OBJECTIVE
Interval History: Patient with headache yesterday afternoon; CT head done and negative for acute pathology.  Complained of dizziness upon standing overnight.  HBG this am 5.6 down from 7.5.  He had a bowel movement that was dark black this morning.      Continuous Infusions:   Scheduled Meds:   amLODIPine  10 mg Oral Daily    aspirin  81 mg Oral Daily    furosemide  40 mg Oral BID    hydrALAZINE  50 mg Oral Q8H    magnesium oxide  400 mg Oral BID    pantoprazole  40 mg Oral Daily    pravastatin  20 mg Oral QHS     PRN Meds:sodium chloride, sodium chloride, ALPRAZolam, ondansetron, sodium chloride 0.9%    Review of patient's allergies indicates:  No Known Allergies  Objective:     Vital Signs (Most Recent):  Temp: 97.2 °F (36.2 °C) (12/08/17 1109)  Pulse: 105 (12/08/17 1112)  Resp: 20 (12/08/17 1109)  BP: (!) 82/0 (12/08/17 1109)  SpO2: 95 % (12/08/17 1109) Vital Signs (24h Range):  Temp:  [97.2 °F (36.2 °C)-99.9 °F (37.7 °C)] 97.2 °F (36.2 °C)  Pulse:  [] 105  Resp:  [16-20] 20  SpO2:  [94 %-99 %] 95 %  BP: (52-95)/(0-61) 82/0     Patient Vitals for the past 72 hrs (Last 3 readings):   Weight   12/08/17 0800 62 kg (136 lb 11 oz)   12/07/17 0700 62.2 kg (137 lb 2 oz)   12/06/17 1530 64.5 kg (142 lb 3.2 oz)     Body mass index is 20.78 kg/m².      Intake/Output Summary (Last 24 hours) at 12/08/17 1131  Last data filed at 12/08/17 0450   Gross per 24 hour   Intake              180 ml   Output             1100 ml   Net             -920 ml     Physical Exam  Constitutional: laying in bed NAD, wife at bedside  Neck: Neck supple. No JVD present. No tracheal deviation present.   Cardiovascular: smooth VAD hum.  Exam reveals no gallop and no friction rub.    Pulmonary/Chest: CTA  Abdominal: Soft. Bowel sounds are normal. He exhibits no distension. There is no tenderness. There is no rebound and no guarding.   Musculoskeletal: He exhibits no edema.   Neurological: He is alert and oriented to person, place, and  time.   Skin: Skin is warm and dry. No erythema.   Psychiatric: He has a normal mood and affect.     Significant Labs:  CBC:    Recent Labs  Lab 12/07/17 0419 12/08/17  0542 12/08/17  0751   WBC 11.44 8.07 9.19   RBC 2.82* 2.13* 2.02*   HGB 7.5* 5.6* 5.4*   HCT 23.2* 18.0* 16.9*    245 234   MCV 82 85 84   MCH 26.6* 26.3* 26.7*   MCHC 32.3 31.1* 32.0     BNP:    Recent Labs  Lab 12/06/17  0954 12/08/17  0542   * 840*     CMP:    Recent Labs  Lab 12/06/17 0954 12/07/17 0419 12/08/17  0542   GLU 93 74 177*   CALCIUM 9.0 8.7 8.9   ALBUMIN 2.8*  --  2.7*   PROT 6.5  --  6.0    141 141   K 3.9 3.8 4.0   CO2 28 29 20*    106 107   BUN 19 28* 45*   CREATININE 0.9 0.9 1.3   ALKPHOS 61  --  45*   ALT 8*  --  6*   AST 14  --  11   BILITOT 0.5  --  0.8      Coagulation:     Recent Labs  Lab 12/06/17 0954 12/07/17 0419 12/08/17  0542   INR 2.9* 2.1* 2.2*   APTT  --  28.2 26.6     LDH:    Recent Labs  Lab 12/06/17 0954 12/07/17 0419 12/08/17  0542    188 209     Microbiology:  Microbiology Results (last 7 days)     ** No results found for the last 168 hours. **          I have reviewed all pertinent labs within the past 24 hours.    Estimated Creatinine Clearance: 48.4 mL/min (based on SCr of 1.3 mg/dL).    Diagnostic Results:  I have reviewed all pertinent imaging results/findings within the past 24 hours.

## 2017-12-08 NOTE — PROGRESS NOTES
VAD coordinator on call paged by ORIANA Medina.  She verbalizes pt having bad headache and is going for STAT head CT.  Encouraged her to keep VAD coordinator posted.  She verbalizes understanding.

## 2017-12-08 NOTE — INTERVAL H&P NOTE
The patient has been examined and the H&P has been reviewed:    There was melena this morning :interval changes since last encounter.    EGD: Melena  Sedation: GA  ASA: per anesthesia  Mallampati: Per anesthesia      Endoscopy risks, benefits and alternative options discussed and understood by patient/family.          Active Hospital Problems    Diagnosis  POA    Normocytic anemia [D64.9]  Yes    LVAD (left ventricular assist device) present [Z95.811]  Not Applicable    Atrial tachycardia [I47.1]  Yes     Chronic    Essential hypertension [I10]  Yes      Resolved Hospital Problems    Diagnosis Date Resolved POA   No resolved problems to display.

## 2017-12-08 NOTE — PT/OT/SLP PROGRESS
Occupational Therapy      Patient Name:  Suman Hayden   MRN:  [id number]    Patient not seen today secondary to RN requesting to Hold off therapy this day as patient HGB is 5.4 and plans to get HBG. Will follow up as appropriate     Beatriz Dubose OT  12/8/2017

## 2017-12-08 NOTE — PROGRESS NOTES
Patient stated he needs to go to the bathroom to have a BM. Upon standing patient became weak, dizzy, and diaphoretic. Patient was assisted to chair and doppler BP rechecked. Doppler dropped from 70/0 while lying down to 52/0 while sitting in chair. Dr. Cardozo notified; will hold AM PO hydralazine. No orders given at this time. Patient returned to bed; dizziness subsided. Will continue to monitor.

## 2017-12-08 NOTE — SIGNIFICANT EVENT
Critical Care Outreach (CORE)  Critical Care Medicine  Consult Note      CORE Metrics:     Admit Date: 2017  LOS: 2  Code Status: Full Code   CC: Artificial Intelligence Rapid Response Alert  Date of Consult: 2017  : 1950  Age: 67 y.o.  Weight:   Wt Readings from Last 1 Encounters:   17 62 kg (136 lb 11 oz)     Race:   Sex: male  Bed: 350/350 A:   MRN: [id number]  RRT Indication(s): Artificial Intelligence Rapid Response Alert  Time CORE Call Received: 11:18  Time CORE at Bedside: 11:20  Was the patient discharged from an ICU this admission? No   Was the patient discharged from a PACU within last 24 hours? No  Did the patient receive conscious sedation/general anesthesia within last 24 hours? No  Was the patient in the ED within the past 24 hours? No  Was the patient started on NIPPV within the past 24 hours? No  Did this progress into an ARC or CPA: No  Attending Physician: Jin Franklin MD  Primary Service: [facility] 1  Disposition: Stay on floor    VAD patient. Transfusion ordered. Appropriate work up underway. Vital signs, imaging, and labs reviewed. No care rendered or requested. Pt resting comfortably. Will sign off.

## 2017-12-08 NOTE — PT/OT/SLP PROGRESS
Physical Therapy      Patient Name:  Suman Hayden   MRN:  [id number]    Orders received for PT evaluation and treatment. Patient not seen today secondary to critical Hgb/Hct (5.4/16.9); plan for EGD today. Therapy contraindicated. RN in agreement with therapy hold this date. Will follow up at next scheduled visit.     Tonja Vallecillo PT, DPT   12/8/2017  Pager: [phone]

## 2017-12-08 NOTE — PLAN OF CARE
Problem: Patient Care Overview  Goal: Plan of Care Review  Outcome: Ongoing (interventions implemented as appropriate)  Patient remains free of falls or injury. Patient complained on headache; STAT CT of head completed. Current H&H 7.5/23.2. GI consult completed. Possible d/c in AM if H&H stable and no headache reported. Plan of care reviewed with patient and wife.

## 2017-12-08 NOTE — PROGRESS NOTES
Patient transported to CT via bed per CSU RN and transport staff with LVAD emergency bag. No acute events. Patient returned to room and resting comfortably. LVAD coordinator, T[place], notified of CT.

## 2017-12-08 NOTE — TREATMENT PLAN
Treatment Plan  12/08/2017  3:57 PM    EGD performed today with impression and recs below.    Impression:             - Normal esophagus.  - Gastritis and duodenitis. No gastrointestinal bleeding seen. Not biopsied due to coagulopathy.    Recommendation:         - Return patient to hospital [facility] for ongoing care.  - Advance diet as tolerated.  - Observe patient's clinical course. May need VCE if continued bleed.  - Will continue to follow alongside primary team    Dale Puga M.D.  Gastroenterology Fellow, PGY-IV  Pager: [phone]  [facility]

## 2017-12-08 NOTE — PROGRESS NOTES
Admit Note     Met with patient and wife on 12/7 to assess needs. Patient is a 67 y.o.  male, admitted for anemia and blood transfusion, per medical record.  Pt has an LVAD, implanted in 7/2017.    Patient admitted from clinic visit on 12/6/2017.  At this time, patient presents as alert and oriented x 4, communicative and asking and answering questions appropriately.  At this time, patients caregiver presents as alert and oriented x 4, communicative and asking and answering questions appropriately.  Pt and wife both present as agitated today and report they are having difficulty in their personal relationship.  SW spent extensive time with pt and wife today to discuss relationship issues.  SW also spoke with pt's dgtr Rayna by phone ([phone]), who called SW to discuss fact that pt has been more agitated at home lately.    Household/Family Systems     Patient currently resides with patient's daughter, at    [address], [address]  [place] [address]    Pt's permanent address is:    [address] [place] [address]     Pt has been staying with his dgtr recently for additional support.  Per dgtr, pt's wife comes to dgtr's home to check in on pt and to transport him to medical appts.    Home: [phone]  Pt cell:  [phone]  Kia Hayden (wife): [phone]  Rayna (dgtr): [phone]  Boni Hayden (son): [phone]  Artie Hayden (nephew, lives in [place]): [phone]    Support system includes spouse, adult children, extended family, and many friends.  Patient does not have dependents that are need of being cared for.     Patients primary caregiver is Kia Hayden, patients wife, and dgtr Rayna.  During admission, patient's wife plans to stay in patient's room.  Confirmed patient and patients caregivers do have access to reliable transportation.    Cognitive Status/Learning     Patient reports reading ability as 9th grade and states patient does not have difficulty with  seeing, hearing, comprehension, learning and memory. Pt does report difficulty with reading and writing. Pt's wife assists with reading and writing.  Patient reports patient learns best by one-on-one.     Needed: No.   Highest education level: High School (9-12) or GED    Vocation/Disability     Working for Income: No  If no, reason not working: Patient Choice - Retired    Patient is retired from the [employer] in 2000.  Pt was a .  Pt's wife was working as a PCA until January 2017, however she now cares for pt.     Adherence     Patient reports a high level of adherence to patients health care regimen.  Adherence counseling and education provided. Patient verbalizes understanding.    Substance Use    Patient reports the following substance usage.    Tobacco: none, patient denies any use.  Alcohol: none, patient denies any use.  Illicit Drugs/Non-prescribed Medications: none, patient denies any use.  Patient states clear understanding of the potential impact of substance use.  Substance abstinence/cessation counseling, education and resources provided and reviewed.     Services Utilizing/ADLS    Infusion Service: Prior to admission, patient utilizing? no, pt has used [facility] in the past  Home Health: Prior to admission, patient utilizing? yes, pt has [facility] (ph: [phone], f: [phone]) for skilled nrsg visits.  Pt requesting to resume with [facility] at d/c.  DME: Prior to admission, yes, [place] and w/c  Pulmonary/Cardiac Rehab: Prior to admission, no  Dialysis:  Prior to admission, no  Transplant Specialty Pharmacy:  Prior to admission, n/a    Prior to admission, patient reports patient was mostly independent with ADLS and was not driving. Pt's wife drives.  Patient reports patient is able to care for self at this time.  Patient indicates a willingness to care for self once medically cleared to do so.    Insurance/Medications    Insured by   Payor/Plan Subscr   Sex Relation Sub. Ins. ID Effective Group Num   1. MEDICARE - ME* MIRTA LOPEZ 1950 Male  [id number] 6/1/15                                    [address]   2. BLUE CROSS BL* MIRTA LOPEZ 1950 Male  [id number] 1/1/10 [id number]                                   [address] [address]      Primary Insurance (for UNOS reporting): Public Insurance - Medicare FFS (Fee For Service)  Secondary Insurance (for UNOS reporting): Private Insurance    Patient reports patient is able to obtain and afford medications at this time and at time of discharge.    Living Will/Healthcare Power of     Patient states patient does not have a LW and/or HCPA.   provided education regarding LW and HCPA and the completion of forms.    Coping/Mental Health    Patient and wife report difficulty coping presently.  Patient denies mental health difficulties.  Pt and wife report their relationship has been strained recently.  Pt's [facility] Rayna ([phone]) also called SW today to report pt has had difficulty coping recently.  Pt does report poor rest last night and reports poor appetite and refusing to eat today.  SW had long discussion with pt and wife today, with HTS team present of part of discussion.  Pt and wife both verbalizing frustrations they are having.  Conversation did deviate at many points.  SW providing extensive active listening, support, and normalization of both personal & relationship frustrations to pt and wife.  SW continuing to follow and will re-assess coping throughout hospital stay.    Discharge Planning    At time of discharge, patient plans to return to [facility]'s home under the care of wife and daughter.  Patients wife will transport patient.  Per rounds today, expected discharge date may be tomorrow.  Patient and patients caregiver verbalize understanding and are involved in treatment planning and discharge process.    Additional Concerns    Patient and caregiver verbalize  understanding and agreement with information reviewed,  availability, and how to access available resources as needed.  Patient and caregiver deny additional needs or concerns at this time.  Patient is being followed for needs, education, resources, information, emotional support, supportive counseling, and for supportive and skilled discharge plan of care.  providing ongoing psychosocial support, education, resources and d/c planning as needed.  SW remains available.

## 2017-12-08 NOTE — PROGRESS NOTES
Awaiting call to transport patient to CT scan. Patient resting in bed. States his headache is resolved at this time. VSS as charted. Will continue to monitor.

## 2017-12-08 NOTE — PATIENT INSTRUCTIONS
Discharge Summary/Instructions after an Endoscopic Procedure  Patient Name: Suman Hayden  Patient MRN: [id number]  Patient YOB: 1950 Friday, December 08, 2017  Gagan Barger MD  RESTRICTIONS:  During your procedure today, you received medications for sedation.  These   medications may affect your judgment, balance and coordination.  Therefore,   for 24 hours, you have the following restrictions:   - DO NOT drive a car, operate machinery, make legal/financial decisions,   sign important papers or drink alcohol.    ACTIVITY:  The following day: return to full activity including work, except no heavy   lifting, straining or running for 3 days if polyps were removed.  DIET:  Eat and drink normally unless instructed otherwise.     TREATMENT FOR COMMON SIDE EFFECTS:  - Mild abdominal pain, belching, bloating or excessive gas: rest, eat   lightly and use a heating pad.  - Sore Throat: treat with throat lozenges and/or gargle with warm salt   water.  SYMPTOMS TO WATCH FOR AND REPORT TO YOUR PHYSICIAN:  1. Abdominal pain or bloating, other than gas cramps.  2. Chest pain.  3. Back pain.  4. Chills or fever occurring within 24 hours after the procedure.  5. Rectal bleeding, which would show as bright red, maroon, or black stools.   (A tablespoon of blood from the rectum is not serious, especially if   hemorrhoids are present.)  6. Vomiting.  7. Weakness or dizziness.  8. Because air was used during the procedure, expelling large amounts of air   from your rectum or belching is normal.  9. If a bowel prep was taken, you may not have a bowel movement for 1-3   days.  This is normal.  GO DIRECTLY TO THE NEAREST EMERGENCY ROOM IF YOU HAVE ANY OF THE FOLLOWING:      Difficulty breathing  Chills and/or fever over 101 F   Persistent vomiting and/or vomiting blood   Severe abdominal pain   Severe chest pain   Black, tarry stools   Bleeding- more than one tablespoon   Any other symptom or condition that you may feel  needs urgent attention  Your doctor recommends these additional instructions:  If any biopsies were taken, your doctor s clinic will contact you in 1 to 2   weeks with any results.  Advance your diet as tolerated.   Your clinical course will be observed.  For questions, problems or results please call your physician - Gagan Barger MD at Work:  [phone].  [facility], EMERGENCY ROOM PHONE NUMBER: [phone]  IF A COMPLICATION OR EMERGENCY SITUATION ARISES AND YOU ARE UNABLE TO REACH   YOUR PHYSICIAN - GO DIRECTLY TO THE EMERGENCY ROOM.  Gagan Barger MD  12/8/2017 2:39:10 PM  This report has been verified and signed electronically.

## 2017-12-08 NOTE — PROCEDURES
Patient resting comfortably in bed; wife at bedside. VAD interrogation completed this AM in the event changes needed to be made. Will continue to monitor for further issues.     Pulsatile: Yes, intermittent   VAD Sounds: Smooth  Problems / Issues / Alarms with VAD if any: PI event      VAD Interrogation:  TXP LVAD INTERROGATIONS 12/8/2017 12/8/2017 12/7/2017 12/7/2017 12/7/2017 12/7/2017 12/7/2017   Type (No Data) HeartMate3 HeartMate3 HeartMate3 HeartMate3 HeartMate3 HeartMate3   Flow - 4.0 4.2 4.2 4.2 4.1 4.4   Speed - 5200 5200 5200 5200 5200 5200   PI - 3.1 2.9 2.6 3.1 3.6 2.5   Power ([place]) - 3.4 3.5 3.4 3.5 3.6 3.6   LSL - 4800 4800 4800 4800 - -   Pulsatility - Pulse Pulse Pulse No Pulse - -   Some recent data might be hidden   }

## 2017-12-08 NOTE — PLAN OF CARE
Problem: Patient Care Overview  Goal: Plan of Care Review  Outcome: Ongoing (interventions implemented as appropriate)  Pt free of falls/traumas/injuries. Skin remains clean, dry, and intact.  Pt's H/H was 5.4 and 16.9; pt currently getting 2nd unit of blood. After 2 unit of blood pt scheduled for EGD. Pt re-educated on importance of measuring accurate intake and out put; pt verbalized and demonstrates understanding. Reviewed plan of care with pt; and pt verbalized understanding.  Pt VSS in no distress will continue to monitor.

## 2017-12-08 NOTE — PROGRESS NOTES
Plan of care discussed with patient. Fe IVPB implemented. Patient is free of fall/trauma/injury. Denies CP, SOB, or pain/discomfort. All questions addressed. Will continue to monitor

## 2017-12-08 NOTE — OR NURSING
Pt awake and oriented. BM X1, pt and bed changed. Report given to pts RN, all questions answered. Pt transported with LVAD equipment and RN.

## 2017-12-08 NOTE — ASSESSMENT & PLAN NOTE
-HeartMate 3 Implanted 7/27/17 as DT  -Restarted Coumadin yesterday (held on admission) INR 2.2 this morning.  Acute drop in H/H this morning.  Will hold Coumadin today    -Antiplatelets: aspirin restarted yesterday but will stop again today given drop in H/H this morning    -LDH is stable overall today. Will continue to monitor daily.  -Speed set at 5200 rpm  -Interrogation notable for PI events  -Not listed for OHTx

## 2017-12-08 NOTE — TRANSFER OF CARE
"Anesthesia Transfer of Care Note    Patient: Suman Hayden    Procedure(s) Performed: Procedure(s) (LRB):  ESOPHAGOGASTRODUODENOSCOPY (EGD) (N/A)    Patient location: PACU    Anesthesia Type: general    Transport from OR: Transported from OR on 2-3 L/min O2 by NC with adequate spontaneous ventilation    Post pain: adequate analgesia    Post assessment: no apparent anesthetic complications and tolerated procedure well    Post vital signs: stable    Level of consciousness: sedated and responds to stimulation    Nausea/Vomiting: no nausea/vomiting    Complications: none    Transfer of care protocol was followed      Last vitals:   Visit Vitals  BP (!) 102/58 (BP Location: Left arm, Patient Position: Lying)   Pulse 81   Temp 36.5 °C (97.7 °F) (Skin)   Resp 18   Ht 5' 8" (1.727 m)   Wt 62 kg (136 lb 11 oz)   SpO2 100%   BMI 20.78 kg/m²     "

## 2017-12-08 NOTE — CONSULTS
Single lumen 18 x 8 midline placed to right brachial vein.  Max dwell date 1/6/18. Lot# [id number]. Needle advanced using realtime ultrasound guidance. Image recorded and saved.

## 2017-12-08 NOTE — PROGRESS NOTES
Pt sent to Endo with ORIANA Sigala. Blood still currently infusing. Pt sent with monitor, power module and 4 batteries. Report given to ORIANA Ibarra in endo. Pt VSS in no distress. Notified ORIANA Cagle VAD coordinator of h/h and per coordinator 20 for hct is as low HM3 go. HCT adjusted to 20 this am.

## 2017-12-08 NOTE — PROGRESS NOTES
[facility]  Heart Transplant  Progress Note    Patient Name: Suman Hayden  MRN: [id number]  Admission Date: 12/6/2017  Hospital Length of Stay: 2 days  Attending Physician: Jin Franklin MD  Primary Care Provider: Joe Ernst MD  Principal Problem:<principal problem not specified>    Subjective:     Interval History: Patient with headache yesterday afternoon; CT head done and negative for acute pathology.  Complained of dizziness upon standing overnight.  HBG this am 5.6 down from 7.5.  He had a bowel movement that was dark black this morning.      Continuous Infusions:   Scheduled Meds:   amLODIPine  10 mg Oral Daily    aspirin  81 mg Oral Daily    furosemide  40 mg Oral BID    hydrALAZINE  50 mg Oral Q8H    magnesium oxide  400 mg Oral BID    pantoprazole  40 mg Oral Daily    pravastatin  20 mg Oral QHS     PRN Meds:sodium chloride, sodium chloride, ALPRAZolam, ondansetron, sodium chloride 0.9%    Review of patient's allergies indicates:  No Known Allergies  Objective:     Vital Signs (Most Recent):  Temp: 97.2 °F (36.2 °C) (12/08/17 1109)  Pulse: 105 (12/08/17 1112)  Resp: 20 (12/08/17 1109)  BP: (!) 82/0 (12/08/17 1109)  SpO2: 95 % (12/08/17 1109) Vital Signs (24h Range):  Temp:  [97.2 °F (36.2 °C)-99.9 °F (37.7 °C)] 97.2 °F (36.2 °C)  Pulse:  [] 105  Resp:  [16-20] 20  SpO2:  [94 %-99 %] 95 %  BP: (52-95)/(0-61) 82/0     Patient Vitals for the past 72 hrs (Last 3 readings):   Weight   12/08/17 0800 62 kg (136 lb 11 oz)   12/07/17 0700 62.2 kg (137 lb 2 oz)   12/06/17 1530 64.5 kg (142 lb 3.2 oz)     Body mass index is 20.78 kg/m².      Intake/Output Summary (Last 24 hours) at 12/08/17 1131  Last data filed at 12/08/17 0450   Gross per 24 hour   Intake              180 ml   Output             1100 ml   Net             -920 ml     Physical Exam  Constitutional: laying in bed NAD, wife at bedside  Neck: Neck supple. No JVD present. No tracheal deviation present.    Cardiovascular: smooth VAD hum.  Exam reveals no gallop and no friction rub.    Pulmonary/Chest: CTA  Abdominal: Soft. Bowel sounds are normal. He exhibits no distension. There is no tenderness. There is no rebound and no guarding.   Musculoskeletal: He exhibits no edema.   Neurological: He is alert and oriented to person, place, and time.   Skin: Skin is warm and dry. No erythema.   Psychiatric: He has a normal mood and affect.     Significant Labs:  CBC:    Recent Labs  Lab 12/07/17 0419 12/08/17  0542 12/08/17  0751   WBC 11.44 8.07 9.19   RBC 2.82* 2.13* 2.02*   HGB 7.5* 5.6* 5.4*   HCT 23.2* 18.0* 16.9*    245 234   MCV 82 85 84   MCH 26.6* 26.3* 26.7*   MCHC 32.3 31.1* 32.0     BNP:    Recent Labs  Lab 12/06/17  0954 12/08/17  0542   * 840*     CMP:    Recent Labs  Lab 12/06/17  0954 12/07/17 0419 12/08/17  0542   GLU 93 74 177*   CALCIUM 9.0 8.7 8.9   ALBUMIN 2.8*  --  2.7*   PROT 6.5  --  6.0    141 141   K 3.9 3.8 4.0   CO2 28 29 20*    106 107   BUN 19 28* 45*   CREATININE 0.9 0.9 1.3   ALKPHOS 61  --  45*   ALT 8*  --  6*   AST 14  --  11   BILITOT 0.5  --  0.8      Coagulation:     Recent Labs  Lab 12/06/17  0954 12/07/17 0419 12/08/17  0542   INR 2.9* 2.1* 2.2*   APTT  --  28.2 26.6     LDH:    Recent Labs  Lab 12/06/17  0954 12/07/17 0419 12/08/17  0542    188 209     Microbiology:  Microbiology Results (last 7 days)     ** No results found for the last 168 hours. **          I have reviewed all pertinent labs within the past 24 hours.    Estimated Creatinine Clearance: 48.4 mL/min (based on SCr of 1.3 mg/dL).    Diagnostic Results:  I have reviewed all pertinent imaging results/findings within the past 24 hours.    Assessment and Plan:     No notes on file    LVAD (left ventricular assist device) present    -HeartMate 3 Implanted 7/27/17 as DT  -Restarted Coumadin yesterday (held on admission) INR 2.2 this morning.  Acute drop in H/H this morning.  Will hold  Coumadin today    -Antiplatelets: aspirin restarted yesterday but will stop again today given drop in H/H this morning    -LDH is stable overall today. Will continue to monitor daily.  -Speed set at 5200 rpm  -Interrogation notable for PI events  -Not listed for OHTx          Normocytic anemia    -s/p transfusion of 2 units with appropriate rise; however drop in H/H again today.  Will transfuse another two units   -Appreciate GI consultation; plan was for outpatient study; however will proceed with EGD today given drop this morning.  If negative then will plan for VCE on Monday   -Will continue PPI   -Patient given IV iron yesterday; had failed oral iron at home at high doses secondary to nausea and constipation          Essential hypertension    -restarted home regimen; will monitor response        Atrial tachycardia    -on telemetry; no events overnight             MELISSA Jon  Heart Transplant  [facility]

## 2017-12-08 NOTE — CARE UPDATE
RN Proactive Rounding Note  Time of Visit: 1045    Admit Date: 2017  LOS: 2  Code Status: Full Code   Date of Visit: 2017  : 1950  Age: 67 y.o.  Sex: male  Bed: 350/350 A:   MRN: [id number]  Was the patient discharged from an ICU this admission? no   Was the patient discharged from a PACU within last 24 hours? no  Did the patient receive conscious sedation/general anesthesia in last 24 hours? no  Was the patient in the ED within the past 24 hours? no  Was the patient started on NIPPV within the past 24 hours? no  Attending Physician: Jin Franklin MD  Primary Service: [facility]AM 1      ASSESSMENT:     Modified Early Warning Score (MEWS):   Abnormal Vital Signs: LVAD  Clinical Issues:      INTERVENTIONS/ RECOMMENDATIONS:     Identified by charge nurse as patient with low H&H requiring blood transfusions.  Visited with patient and wife and discussed plan of care since beginning of shift today    Discussed plan of care with RN      PHYSICIAN ESCALATION:     Yes/No no    Orders received and case discussed with      Disposition:     FOLLOW-UP/CONTINGENCY:       Call back the Rapid Response Nurse at [phone]  for additional questions or concerns

## 2017-12-08 NOTE — PROGRESS NOTES
"Patient called nurse's station with complaint of headache 8/10 stating he is "swimming in the head". Upon entrance to room patient diaphoretic. VSS as charted, . IV iron infusing per orders, but patient states he has had a reaction to IV iron previously. IV iron turned off at this time. Dr. Cardozo notified; order for IV benadryl and IV methylprednisolone placed. Order for STAT CT placed.   "

## 2017-12-08 NOTE — H&P (VIEW-ONLY)
[facility]  Gastroenterology  Consult Note    Patient Name: Suman Hayden  MRN: [id number]  Admission Date: 12/6/2017  Hospital Length of Stay: 1 days  Code Status: Full Code   Attending Provider: Jin Franklin MD   Consulting Provider: Maday Hood MD  Primary Care Physician: Joe Ernst MD  Principal Problem:<principal problem not specified>    Inpatient consult to Gastroenterology  Consult performed by: MADAY HOOD  Consult ordered by: MARY ELLEN GARCIA        Subjective:     HPI:  67 year old male with a history of HTN, HLD CKD Stage 3, and NCIM s/p LVAD on 7/27/17 sent to the hospital after CBC revealed a hemoglobin of 5.6. GI consulted for a possible GI bleed.    History provided by patient.   Patient states that he was instructed to come to the hospital as hemoglobin was 5.6 and INR was 2.9.  At home he is on ASA (81 mg POI Qdaily), Coumadin (2 mg PO Qdaily), as well as a PPI daily.  He denies any chest pain, shortness of breath, or palpitations  He denies any nausea, emesis, abdominal pain, melena or BRBPR.  He denies additional NSAID use, Iron supplements or pepto.    Of note he does have a left chest wall hematoma which is impressive but per wife stable in size.  He does have a history of a GI bleed during a prior admission. He had an EGD at that time on 8/17/17 which showed LA grade D esophagitis with clots in the entire stomach with hemorrhagic mucosa.      Past Medical History:   Diagnosis Date    Cardiomyopathy     Hyperlipidemia     Hypertension     Obesity     S/P implantation of automatic cardioverter/defibrillator (AICD)     Ventricular tachycardia        Past Surgical History:   Procedure Laterality Date    CARDIAC CATHETERIZATION      CARDIAC DEFIBRILLATOR PLACEMENT      LEFT VENTRICULAR ASSIST DEVICE  07/27/2017       Review of patient's allergies indicates:  No Known Allergies  Family History     Problem Relation (Age of Onset)    Heart disease  Mother, Father        Social History Main Topics    Smoking status: Never Smoker    Smokeless tobacco: Never Used    Alcohol use No    Drug use: Unknown    Sexual activity: Not on file     Review of Systems   Constitutional: Negative for activity change, appetite change, chills, fatigue and fever.   HENT: Negative for trouble swallowing.    Eyes: Negative.    Respiratory: Negative.    Cardiovascular: Negative.    Gastrointestinal: Negative.    Endocrine: Negative.    Genitourinary: Negative.    Neurological: Negative.      Objective:     Vital Signs (Most Recent):  Temp: 98.7 °F (37.1 °C) (12/07/17 0330)  Pulse: 79 (12/07/17 0713)  Resp: 18 (12/07/17 0330)  BP: (!) 82/0 (12/07/17 0330)  SpO2: 99 % (12/07/17 0330) Vital Signs (24h Range):  Temp:  [98.1 °F (36.7 °C)-98.8 °F (37.1 °C)] 98.7 °F (37.1 °C)  Pulse:  [78-90] 79  Resp:  [16-18] 18  SpO2:  [98 %-99 %] 99 %  BP: (74-92)/(0-68) 82/0     Weight: 64.5 kg (142 lb 3.2 oz) (12/06/17 1530)  Body mass index is 21.62 kg/m².      Intake/Output Summary (Last 24 hours) at 12/07/17 0843  Last data filed at 12/07/17 0600   Gross per 24 hour   Intake            657.5 ml   Output             1425 ml   Net           -767.5 ml       Lines/Drains/Airways     Line                 VAD 07/27/17 1312 Left ventricular assist device HeartMate 3 132 days          Peripheral Intravenous Line                 Peripheral IV - Single Lumen 12/06/17 1905 Right Forearm less than 1 day                Physical Exam   Constitutional: He is oriented to person, place, and time. No distress.   HENT:   Head: Normocephalic and atraumatic.   Eyes: No scleral icterus.   Neck: Normal range of motion.   Cardiovascular: Normal rate.    LVAD hum present   Pulmonary/Chest: Effort normal and breath sounds normal.   Abdominal: Soft. Bowel sounds are normal. He exhibits no distension and no mass. There is no tenderness. There is no rebound and no guarding. No hernia.   CAMDEN with no hemorrhoids, dark brown  stools no overt melena or BRB   Musculoskeletal: Normal range of motion. He exhibits no edema.   Neurological: He is alert and oriented to person, place, and time.   Skin: He is not diaphoretic.       Significant Labs:  CBC:   Recent Labs  Lab 12/06/17  0954 12/07/17  0419   WBC 8.00 11.44   HGB 5.6* 7.5*   HCT 18.0* 23.2*    263     CMP:   Recent Labs  Lab 12/06/17  0954 12/07/17 0419   GLU 93 74   CALCIUM 9.0 8.7   ALBUMIN 2.8*  --    PROT 6.5  --     141   K 3.9 3.8   CO2 28 29    106   BUN 19 28*   CREATININE 0.9 0.9   ALKPHOS 61  --    ALT 8*  --    AST 14  --    BILITOT 0.5  --      Coagulation:   Recent Labs  Lab 12/07/17 0419   INR 2.1*   APTT 28.2       Significant Imaging:  Imaging results within the past 24 hours have been reviewed.    Assessment/Plan:     Normocytic anemia    67 year old male with a history of HTN, HLD CKD Stage 3, and NCIM s/p LVAD on 7/27/17 sent to the hospital after CBC revealed a hemoglobin of 5.6. GI consulted for a possible GI bleed.  Although not report over GI bleed patient does have a history of prior GI and is an LVAD patient on AC therefore would recommend a double (EGD and colonoscopy) as an outpatient.    Recommendations:  Continue to trend H/H and transfuse as needed  Continue Protonix Qdaily  EGD and colonoscopy as an outpatient (order to be placed by GI)            Thank you for your consult. I will follow-up with patient. Please contact us if you have any additional questions.     Dale Puga M.D.  Gastroenterology Fellow, PGY-IV  Pager: [phone]  [facility]

## 2017-12-08 NOTE — PROGRESS NOTES
Pt back from endo, pt returned with tele and all vad equipment. Pt VSS in no distress. Notified MELISSA Nunez. PA ordered CBC at 1800 and pt can resume previous diet. Orders implemented. Will continue to monitor.

## 2017-12-09 NOTE — PT/OT/SLP PROGRESS
Physical Therapy      Patient Name:  Suman Hayden   MRN:  [id number]    Patient not seen today secondary to Patient unwilling to participate, Patient ill (Comment). Just received blood and complaining of fatigue and cramping sensation.  Nurse notified.  Will follow-up tomorrow if pt is agreeable. .    Wilian Zarate, PT

## 2017-12-09 NOTE — PLAN OF CARE
Problem: Patient Care Overview  Goal: Plan of Care Review  Outcome: Ongoing (interventions implemented as appropriate)  Pt free of falls/traumas/injuries. Skin remains clean, dry, and intact. Pt s/p 1 unit of RBC. Per Dr. Franklin no need to recheck h/h until the am. Pt's VAD numbers WDL. Pt's wife will change dressing.  Pt re-educated on importance of measuring accurate intake and out put; pt verbalized and demonstrates understanding. Reviewed plan of care with pt; and pt verbalized understanding.  Pt VSS in no distress will continue to monitor.

## 2017-12-09 NOTE — ASSESSMENT & PLAN NOTE
-s/p transfusion of 2 units with appropriate rise; however drop in H/H again yesterday s/p 2 units and another drop today.  Will transfuse one unit   -Appreciate GI consultation; plan was for outpatient study; however will proceed with EGD today given drop this morning.  If negative then will plan for VCE on Monday   -Will continue PPI   -Patient given IV iron yesterday; had failed oral iron at home at high doses secondary to nausea and constipation

## 2017-12-09 NOTE — SUBJECTIVE & OBJECTIVE
Interval History: Patient feels well. Hb dropped to 6.7 overnight not symptomatic. EGD done yesterday nothing significant. Colonoscopy on Monday if nothing found will do VCE.     Continuous Infusions:   Scheduled Meds:   amLODIPine  10 mg Oral Daily    furosemide  40 mg Oral BID    hydrALAZINE  50 mg Oral Q8H    magnesium oxide  400 mg Oral BID    pantoprazole  40 mg Intravenous BID    pravastatin  20 mg Oral QHS     PRN Meds:sodium chloride, sodium chloride, ALPRAZolam, ondansetron, sodium chloride 0.9%    Review of patient's allergies indicates:  No Known Allergies  Objective:     Vital Signs (Most Recent):  Temp: 98.6 °F (37 °C) (12/09/17 0500)  Pulse: 92 (12/09/17 0700)  Resp: 16 (12/09/17 0500)  BP: 97/67 (12/09/17 0501)  SpO2: 97 % (12/09/17 0500) Vital Signs (24h Range):  Temp:  [97.2 °F (36.2 °C)-100.3 °F (37.9 °C)] 98.6 °F (37 °C)  Pulse:  [] 92  Resp:  [16-20] 16  SpO2:  [95 %-100 %] 97 %  BP: ()/(0-84) 97/67     Patient Vitals for the past 72 hrs (Last 3 readings):   Weight   12/08/17 0800 62 kg (136 lb 11 oz)   12/07/17 0700 62.2 kg (137 lb 2 oz)   12/06/17 1530 64.5 kg (142 lb 3.2 oz)     Body mass index is 20.78 kg/m².      Intake/Output Summary (Last 24 hours) at 12/09/17 0723  Last data filed at 12/09/17 0500   Gross per 24 hour   Intake              730 ml   Output              700 ml   Net               30 ml       Hemodynamic Parameters:       Physical Exam   Constitutional: He is oriented to person, place, and time. No distress.   HENT:   Head: Normocephalic and atraumatic.   Eyes: No scleral icterus.   Neck: Normal range of motion.   Cardiovascular: Normal rate.    LVAD hum present   Pulmonary/Chest: Effort normal and breath sounds normal.   Abdominal: Soft. Bowel sounds are normal. He exhibits no distension and no mass. There is no tenderness. There is no rebound and no guarding. No hernia.   CAMDEN with no hemorrhoids, dark brown stools no overt melena or BRB   Musculoskeletal:  Normal range of motion. He exhibits no edema.   Neurological: He is alert and oriented to person, place, and time.   Skin: He is not diaphoretic.       Significant Labs:  CBC:    Recent Labs  Lab 12/08/17 1734 12/08/17 2326 12/09/17  0534   WBC 14.00* 16.66* 16.18*   RBC 2.64* 2.40* 2.42*   HGB 7.0* 6.7* 6.7*   HCT 22.0* 20.2* 21.0*    202 194   MCV 83 84 87   MCH 26.5* 27.9 27.7   MCHC 31.8* 33.2 31.9*     BNP:    Recent Labs  Lab 12/06/17  0954 12/08/17  0542   * 840*     CMP:    Recent Labs  Lab 12/06/17 0954 12/07/17 0419 12/08/17  0542 12/09/17  0534   GLU 93 74 177* 98   CALCIUM 9.0 8.7 8.9 8.9   ALBUMIN 2.8*  --  2.7*  --    PROT 6.5  --  6.0  --     141 141 144   K 3.9 3.8 4.0 3.9   CO2 28 29 20* 24    106 107 108   BUN 19 28* 45* 70*   CREATININE 0.9 0.9 1.3 1.6*   ALKPHOS 61  --  45*  --    ALT 8*  --  6*  --    AST 14  --  11  --    BILITOT 0.5  --  0.8  --       Coagulation:     Recent Labs  Lab 12/06/17 0954 12/07/17 0419 12/08/17  0542 12/09/17  0534   INR 2.9* 2.1* 2.2*  --    APTT  --  28.2 26.6 25.4     LDH:    Recent Labs  Lab 12/06/17  0954 12/07/17 0419 12/08/17  0542 12/09/17  0534    188 209 173     Microbiology:  Microbiology Results (last 7 days)     ** No results found for the last 168 hours. **          I have reviewed all pertinent labs within the past 24 hours.    Estimated Creatinine Clearance: 39.3 mL/min (based on SCr of 1.6 mg/dL (H)).    Diagnostic Results:  I have reviewed and interpreted all pertinent imaging results/findings within the past 24 hours.

## 2017-12-09 NOTE — PROGRESS NOTES
Notified by Dr. Franklin pt can have a regular diet that he does not need a repeat CBC until the am, unless pt becomes symptomatic or evidence of bleeding is present. Will continue to monitor.

## 2017-12-09 NOTE — PLAN OF CARE
Problem: Patient Care Overview  Goal: Plan of Care Review  Outcome: Ongoing (interventions implemented as appropriate)  Patient remains free of falls or injury. Patient denies pain. Current H&H 6.7/20.2--Dr. Cardozo notified. WBC increased to 16.66. GI consulted; EGD completed with no intervention. Plan for colonoscopy on Monday. Daily VAD dressing change completed under sterile technique per patient's wife. Plan of care reviewed with patient and wife.

## 2017-12-09 NOTE — PROGRESS NOTES
[facility]  Heart Transplant  Progress Note    Patient Name: Suman Hayden  MRN: [id number]  Admission Date: 12/6/2017  Hospital Length of Stay: 3 days  Attending Physician: Jin Franklin MD  Primary Care Provider: Joe Ernst MD  Principal Problem:<principal problem not specified>    Subjective:     Interval History: Patient feels well. Hb dropped to 6.7 overnight not symptomatic. EGD done yesterday nothing significant. Colonoscopy on Monday if nothing found will do VCE.     Continuous Infusions:   Scheduled Meds:   amLODIPine  10 mg Oral Daily    furosemide  40 mg Oral BID    hydrALAZINE  50 mg Oral Q8H    magnesium oxide  400 mg Oral BID    pantoprazole  40 mg Intravenous BID    pravastatin  20 mg Oral QHS     PRN Meds:sodium chloride, sodium chloride, ALPRAZolam, ondansetron, sodium chloride 0.9%    Review of patient's allergies indicates:  No Known Allergies  Objective:     Vital Signs (Most Recent):  Temp: 98.6 °F (37 °C) (12/09/17 0500)  Pulse: 92 (12/09/17 0700)  Resp: 16 (12/09/17 0500)  BP: 97/67 (12/09/17 0501)  SpO2: 97 % (12/09/17 0500) Vital Signs (24h Range):  Temp:  [97.2 °F (36.2 °C)-100.3 °F (37.9 °C)] 98.6 °F (37 °C)  Pulse:  [] 92  Resp:  [16-20] 16  SpO2:  [95 %-100 %] 97 %  BP: ()/(0-84) 97/67     Patient Vitals for the past 72 hrs (Last 3 readings):   Weight   12/08/17 0800 62 kg (136 lb 11 oz)   12/07/17 0700 62.2 kg (137 lb 2 oz)   12/06/17 1530 64.5 kg (142 lb 3.2 oz)     Body mass index is 20.78 kg/m².      Intake/Output Summary (Last 24 hours) at 12/09/17 0723  Last data filed at 12/09/17 0500   Gross per 24 hour   Intake              730 ml   Output              700 ml   Net               30 ml       Hemodynamic Parameters:       Physical Exam   Constitutional: He is oriented to person, place, and time. No distress.   HENT:   Head: Normocephalic and atraumatic.   Eyes: No scleral icterus.   Neck: Normal range of motion.   Cardiovascular:  Normal rate.    LVAD hum present   Pulmonary/Chest: Effort normal and breath sounds normal.   Abdominal: Soft. Bowel sounds are normal. He exhibits no distension and no mass. There is no tenderness. There is no rebound and no guarding. No hernia.   CAMDEN with no hemorrhoids, dark brown stools no overt melena or BRB   Musculoskeletal: Normal range of motion. He exhibits no edema.   Neurological: He is alert and oriented to person, place, and time.   Skin: He is not diaphoretic.       Significant Labs:  CBC:    Recent Labs  Lab 12/08/17  1734 12/08/17  2326 12/09/17  0534   WBC 14.00* 16.66* 16.18*   RBC 2.64* 2.40* 2.42*   HGB 7.0* 6.7* 6.7*   HCT 22.0* 20.2* 21.0*    202 194   MCV 83 84 87   MCH 26.5* 27.9 27.7   MCHC 31.8* 33.2 31.9*     BNP:    Recent Labs  Lab 12/06/17  0954 12/08/17  0542   * 840*     CMP:    Recent Labs  Lab 12/06/17  0954 12/07/17 0419 12/08/17  0542 12/09/17  0534   GLU 93 74 177* 98   CALCIUM 9.0 8.7 8.9 8.9   ALBUMIN 2.8*  --  2.7*  --    PROT 6.5  --  6.0  --     141 141 144   K 3.9 3.8 4.0 3.9   CO2 28 29 20* 24    106 107 108   BUN 19 28* 45* 70*   CREATININE 0.9 0.9 1.3 1.6*   ALKPHOS 61  --  45*  --    ALT 8*  --  6*  --    AST 14  --  11  --    BILITOT 0.5  --  0.8  --       Coagulation:     Recent Labs  Lab 12/06/17  0954 12/07/17 0419 12/08/17  0542 12/09/17  0534   INR 2.9* 2.1* 2.2*  --    APTT  --  28.2 26.6 25.4     LDH:    Recent Labs  Lab 12/06/17  0954 12/07/17  0419 12/08/17  0542 12/09/17  0534    188 209 173     Microbiology:  Microbiology Results (last 7 days)     ** No results found for the last 168 hours. **          I have reviewed all pertinent labs within the past 24 hours.    Estimated Creatinine Clearance: 39.3 mL/min (based on SCr of 1.6 mg/dL (H)).    Diagnostic Results:  I have reviewed and interpreted all pertinent imaging results/findings within the past 24 hours.    Assessment and Plan:     No notes on file    Normocytic  anemia    -s/p transfusion of 2 units with appropriate rise; however drop in H/H again yesterday s/p 2 units and another drop today.  Will transfuse one unit   -Appreciate GI consultation; plan was for outpatient study; however will proceed with EGD today given drop this morning.  If negative then will plan for VCE on Monday   -Will continue PPI   -Patient given IV iron yesterday; had failed oral iron at home at high doses secondary to nausea and constipation          LVAD (left ventricular assist device) present    -HeartMate 3 Implanted 7/27/17 as DT  -Restarted Coumadin yesterday (held on admission) INR 2.2 this morning.  Acute drop in H/H this morning.  Will hold Coumadin today    -Antiplatelets: aspirin restarted yesterday but will stop again today given drop in H/H this morning    -LDH is stable overall today. Will continue to monitor daily.  -Speed set at 5200 rpm  -Interrogation notable for PI events  -Not listed for OHTx          Atrial tachycardia    -on telemetry; no events overnight         Essential hypertension    -restarted home regimen; will monitor response            Rai Krishna MD  Heart Transplant  [facility]

## 2017-12-09 NOTE — PROGRESS NOTES
Repeat CBC WBC 16.66, hemoglobin 6.7, hematocrit 20.2; patient resting comfortably, VSS. Dr. Cardozo notified; states to monitor patient. No further orders given. Will continue to monitor.

## 2017-12-09 NOTE — PROGRESS NOTES
Notified Dr. Solis of pt's repeat H/H 7.0 and 22.0. WBC increased to 14.00. Per MD will recheck WBC at 2300. No further orders will continue to monitor.

## 2017-12-09 NOTE — PT/OT/SLP EVAL
Occupational Therapy   Evaluation and Discharge Note    Name: Suman Hayden  MRN: [id number]  Admitting Diagnosis:  Pt admitted for low H&H, possible GI bleed  LVAD inserted July 2017, ICD lead revision 11/20    Recommendations:     Discharge Recommendations: home with home health (resume HH)  Discharge Equipment Recommendations:  none  Barriers to discharge:  None    History:     Occupational Profile:  Living Environment: Pt lives with his wife and adult children in a 2nd floor apt. Pt reports that PTA he was (I) with ambulation and ADLs, not using DME. Pt's wife able to assist upon d/c.   Previous level of function: (I), using rollator as needed  Equipment Owned:  rollator  Assistance upon Discharge: Family assistance available    Past Medical History:   Diagnosis Date    Cardiomyopathy     Hyperlipidemia     Hypertension     Obesity     S/P implantation of automatic cardioverter/defibrillator (AICD)     Ventricular tachycardia        Past Surgical History:   Procedure Laterality Date    CARDIAC CATHETERIZATION      CARDIAC DEFIBRILLATOR PLACEMENT      LEFT VENTRICULAR ASSIST DEVICE  07/27/2017       Subjective     Chief Complaint: Pt frustrated with multiple recent hospital admissions  Patient/Family stated goals: Return home  Communicated with: RN prior to session.  Pain/Comfort:  · Pain Rating 1: 0/10  · Pain Rating Post-Intervention 1: 0/10    Objective:     Patient found with:  (LVAD to wall power)    General Precautions: Standard, fall, LVAD   Orthopedic Precautions:N/A   Braces: N/A     Occupational Performance:    Bed Mobility:    · NT as pt up in chair    Functional Mobility/Transfers:  · Patient completed Sit <> Stand Transfer with supervision  with  no assistive device from bedside chair    Activities of Daily Living:  · UB Dressing: supervision to don shirt, jacket, wife assisted with LVAD darvin  · LB Dressing: supervision to don pants, tennis shoes    Cognitive/Visual  "Perceptual:  Cognitive/Psychosocial Skills:     -       Oriented to: Person, Place, Time and Situation   -       Follows Commands/attention:Follows one-step commands  -       Communication: clear/fluent  -       Safety awareness/insight to disability: intact     Physical Exam:  Balance: -       supervision for dynamic standing balance  Postural examination/scapula alignment:    -       Rounded shoulders  Skin integrity: Visible skin intact  Edema:  None noted  Upper Extremity Range of Motion:     -       Right Upper Extremity: WFL  -       Left Upper Extremity: WFL  Upper Extremity Strength:    -       Right Upper Extremity: WFL  -       Left Upper Extremity: WFL  Fine Motor Coordination:    -       Intact    Patient left up in chair with all lines intact, call button in reach and wife present    AMPA 6 Click:  [facility] Total Score: 19    Treatment & Education:  OT evaluation; white board updated; pt and wife familiar due to recent admits; initially upset about OT/PT consult and adamant that he remains (I) and does not "like to be pushed"; then became apologetic and requesting to perform functional mobility around the unit; wife assisted with ADLs and LVAD management, although pt reports he is (I) at home; performed functional mobility using RW due to feeling weak this morning; returned to room, discussed POC with pt and wife, pt declining need for further OT while inpatient, agreeable to resuming HH upon D/C; RW to be ordered for use while in hospital  Education:    Assessment:     Suman Hayden is a 67 y.o. male with a medical diagnosis of decreased H&H, possible GI bleed, history of LVAD. At this time, patient is declining further acute OT therapy services and will be discharged. Recommend RW for use in hospital and resume HH therapy upon D/C.    Clinical Decision Makin.  OT Low:  "Pt evaluation falls under low complexity for evaluation coding due to performance deficits noted in 1-3 areas as stated " "above and 0 co-morbities affecting current functional status. Data obtained from problem focused assessments. No modifications or assistance was required for completion of evaluation. Only brief occupational profile and history review completed."     Plan:     During this hospitalization, patient does not require further acute OT services.  Please re-consult if situation changes.    · Plan of Care Reviewed with: patient, spouse    This Plan of care has been discussed with the patient who was involved in its development and understands and is in agreement with the identified goals and treatment plan    GOALS:    Occupational Therapy Goals     Not on file          Multidisciplinary Problems (Resolved)        Problem: Occupational Therapy Goal    Goal Priority Disciplines Outcome Interventions   Occupational Therapy Goal   (Resolved)     OT, PT/OT Outcome(s) achieved                    Time Tracking:     OT Date of Treatment: 12/09/17  OT Start Time: 0841  OT Stop Time: 0928  OT Total Time (min): 47 min    Billable Minutes:Evaluation 22  Therapeutic Activity 25    DELMY Mohamud  12/9/2017    "

## 2017-12-09 NOTE — PLAN OF CARE
Problem: Occupational Therapy Goal  Goal: Occupational Therapy Goal  Outcome: Outcome(s) achieved Date Met: 12/09/17  Eval and D/C OT 12/9/17

## 2017-12-09 NOTE — PROGRESS NOTES
Notified Dr. Krishna of H/H 6.7 and 21.0. And pt requesting pt has diet replaced with a regular diet instead of cardiac; MD will come and speak with pt and MD will write transfusion orders for 1 unit of RBCs.

## 2017-12-10 NOTE — TREATMENT PLAN
Treatment Plan  12/10/2017  2:56 PM    Patient seen this morning but very hard to obtain an accurate history in regards to melena.   However in light of waxing/waning count with gross melena documented on Friday will proceed with VCE on Tuesday.    Dale Puga M.D.  Gastroenterology Fellow, PGY-IV  Pager: [phone]  [facility]

## 2017-12-10 NOTE — PROGRESS NOTES
12/09/17 1945 12/09/17 1951   Vital Signs   BP (!) 82/0 (!) 89/53   MAP (mmHg) --  65   BP Location Left arm Left arm   BP Method Doppler Automatic   Patient Position Lying Lying   50 mg hydralazine PO ordered. Dr. Cardozo stated ok to hold to avoid drop in BP.

## 2017-12-10 NOTE — PLAN OF CARE
Problem: Patient Care Overview  Goal: Plan of Care Review  Outcome: Ongoing (interventions implemented as appropriate)  Pt free of falls/traumas/injuries. Skin remains clean, dry, and intact. Continue to monitor H/H. INR 2.2. IVP Protonix BID. VAD numbers WDL. Pt's wife completed dressing change. Pt re-educated on importance of measuring accurate intake and out put; pt verbalized and demonstrates understanding. Reviewed plan of care with pt; and pt verbalized understanding.  Pt VSS in no distress will continue to monitor.

## 2017-12-10 NOTE — SUBJECTIVE & OBJECTIVE
Interval History: feeling well s/p 1 unit yesterday. Hb 7.1    Continuous Infusions:   Scheduled Meds:   amLODIPine  10 mg Oral Daily    furosemide  40 mg Oral BID    hydrALAZINE  50 mg Oral Q8H    magnesium oxide  400 mg Oral BID    pantoprazole  40 mg Intravenous BID    potassium chloride 10%  60 mEq Oral Once    pravastatin  20 mg Oral QHS     PRN Meds:sodium chloride, sodium chloride, ALPRAZolam, ondansetron, sodium chloride 0.9%    Review of patient's allergies indicates:  No Known Allergies  Objective:     Vital Signs (Most Recent):  Temp: 97.8 °F (36.6 °C) (12/10/17 0810)  Pulse: 79 (12/10/17 0810)  Resp: 18 (12/10/17 0810)  BP: (!) 82/0 (12/10/17 0810)  SpO2: 95 % (12/10/17 0810) Vital Signs (24h Range):  Temp:  [97.7 °F (36.5 °C)-99.3 °F (37.4 °C)] 97.8 °F (36.6 °C)  Pulse:  [] 79  Resp:  [16-18] 18  SpO2:  [95 %-98 %] 95 %  BP: ()/(0-72) 82/0     Patient Vitals for the past 72 hrs (Last 3 readings):   Weight   12/10/17 0810 60.8 kg (134 lb 0.6 oz)   12/09/17 0751 61.4 kg (135 lb 4 oz)   12/08/17 0800 62 kg (136 lb 11 oz)     Body mass index is 20.38 kg/m².      Intake/Output Summary (Last 24 hours) at 12/10/17 1041  Last data filed at 12/10/17 0810   Gross per 24 hour   Intake              720 ml   Output             2000 ml   Net            -1280 ml       Hemodynamic Parameters:         Physical Exam   Constitutional: He is oriented to person, place, and time. No distress.   HENT:   Head: Normocephalic and atraumatic.   Eyes: No scleral icterus.   Neck: Normal range of motion.   Cardiovascular: Normal rate.    LVAD hum present   Pulmonary/Chest: Effort normal and breath sounds normal.   Abdominal: Soft. Bowel sounds are normal. He exhibits no distension and no mass. There is no tenderness. There is no rebound and no guarding. No hernia.   [place] with no hemorrhoids, dark brown stools no overt melena or BRB   Musculoskeletal: Normal range of motion. He exhibits no edema.   Neurological: He  is alert and oriented to person, place, and time.   Skin: He is not diaphoretic.       Significant Labs:  CBC:    Recent Labs  Lab 12/08/17  2326 12/09/17 0534 12/10/17  0714   WBC 16.66* 16.18* 12.73*   RBC 2.40* 2.42* 2.49*   HGB 6.7* 6.7* 7.1*   HCT 20.2* 21.0* 21.6*    194 156   MCV 84 87 87   MCH 27.9 27.7 28.5   MCHC 33.2 31.9* 32.9     BNP:    Recent Labs  Lab 12/06/17  0954 12/08/17 0542   * 840*     CMP:    Recent Labs  Lab 12/06/17 0954  12/08/17 0542 12/09/17 0534 12/10/17  0714   GLU 93  < > 177* 98 82   CALCIUM 9.0  < > 8.9 8.9 8.5*   ALBUMIN 2.8*  --  2.7*  --   --    PROT 6.5  --  6.0  --   --      < > 141 144 142   K 3.9  < > 4.0 3.9 3.3*   CO2 28  < > 20* 24 28     < > 107 108 108   BUN 19  < > 45* 70* 43*   CREATININE 0.9  < > 1.3 1.6* 1.1   ALKPHOS 61  --  45*  --   --    ALT 8*  --  6*  --   --    AST 14  --  11  --   --    BILITOT 0.5  --  0.8  --   --    < > = values in this interval not displayed.   Coagulation:     Recent Labs  Lab 12/08/17 0542 12/09/17  0534 12/10/17  0714   INR 2.2* 2.8* 2.2*   APTT 26.6 25.4 26.9     LDH:    Recent Labs  Lab 12/08/17 0542 12/09/17 0534 12/10/17  0714    173 163     Microbiology:  Microbiology Results (last 7 days)     ** No results found for the last 168 hours. **          I have reviewed all pertinent labs within the past 24 hours.    Estimated Creatinine Clearance: 56 mL/min (based on SCr of 1.1 mg/dL).    Diagnostic Results:  I have reviewed and interpreted all pertinent imaging results/findings within the past 24 hours.

## 2017-12-10 NOTE — ASSESSMENT & PLAN NOTE
-s/p transfusion of 2 units with appropriate rise; however drop in H/H again 12/8 s/p 2 units and another drop yesterday s/p 1 unit 12/9. Hb 7.1 today.   -Appreciate GI consultation; will plan for VCE on Monday   -Will continue PPI   -Patient given IV iron; had failed oral iron at home at high doses secondary to nausea and constipation

## 2017-12-10 NOTE — ASSESSMENT & PLAN NOTE
-HeartMate 3 Implanted 7/27/17 as DT  -INR 2.2 this morning will likely restart coumadin.   -Antiplatelets: aspirin restarted yesterday but will stop again today given drop in H/H this morning    -LDH is stable overall today. Will continue to monitor daily.  -Speed set at 5200 rpm  -Interrogation notable for PI events  -Not listed for OHTx

## 2017-12-10 NOTE — PROGRESS NOTES
Notified Dr. Krishna pt potassium is 3.3 MD ordered 60 meq of potassium oral solution 10% once. Order implemented. Will continue to monitor.

## 2017-12-10 NOTE — PLAN OF CARE
Problem: Patient Care Overview  Goal: Plan of Care Review  Outcome: Revised  Plan of care discussed with patient and wife. Cscope planned for Monday. HH 6.7/21; 1 unit PRBC transfused during day. Continuing to monitor MAPs as pt is pulsatile. Patient is free of fall/trauma/injury. Denies CP, SOB, or pain/discomfort. All questions addressed. Will continue to monitor.

## 2017-12-11 PROBLEM — I50.9 CHF (CONGESTIVE HEART FAILURE): Status: RESOLVED | Noted: 2017-11-20 | Resolved: 2017-01-01

## 2017-12-11 NOTE — PT/OT/SLP EVAL
"Physical Therapy Evaluation & Discharge     Patient Name:  Suman Hayden   MRN:  [id number]    Recommendations:     Discharge Recommendations:  home   Discharge Equipment Recommendations: none   Barriers to discharge: None    Assessment:     Suman Hayden is a 67 y.o. male admitted with a medical diagnosis of anemia,  LVAD present.  Pt refusing comprehensive mobility evaluation this date; per pt and pt's wife, pt with no concerns for functional mobility, ADLs, or LVAD management. Pt declines to continued PT assessment and treatment. PT orders discontinued. Recommending pt discharge home with family support.        Recent Surgery: Procedure(s) (LRB):  ESOPHAGOGASTRODUODENOSCOPY (EGD) (N/A) 3 Days Post-Op    Plan:   Pt discharged from acute PT services. Pt and spouse decline to continue PT treatment and report no needs for acute therapy.     Plan of Care Reviewed with: patient, spouse    Subjective     Communicated with RN prior to session.  Patient found sitting EOB upon PT entry to room, agreeable to evaluation.  Upon 1st PT visit this AM, pt sitting EOB with wife at bedside;  Pt stated he does not want to get OOB 2/2 "just waking up." Pt and wife educated on purpose of therapy evaluation and benefits of increasing OOB activity. Pt agitated, stating that he is independent but will work with therapist later in AM after eating breakfast. Therapist returned later in AM for 2nd visit. Pt stated: "I'm sorry, I'm didn't mean to be difficult, but I can do everything on my own." Pt and pt's wife in agreement that there is no need for acute therapy during current hospitalization. Per pt's wife: "we don't need therapy like we did when he first got the LVAD, once we have a walker, we will be walking in the hallways." Pt and wife state they have no concerns for pt's mobility or LVAD management.     Chief Complaint: none  Patient comments/goals: return home  Pain/Comfort:  · Pain Rating 1: 0/10  · Pain Rating " Post-Intervention 1: 0/10    Patients cultural, spiritual, Yarsanism conflicts given the current situation: no conflicts    Living Environment:  Pt lives with wife in 2nd floor apartment.   Prior to admission, patients level of function was mod(I) for mobility with rollator and required (A) for ADLs from wife as needed.   Patient has the following equipment: rollator. Upon discharge, patient will have assistance from wife.    Objective:     Patient found with:  (LVAD)     General Precautions: Standard, fall, LVAD   Orthopedic Precautions:N/A   Braces: N/A     Exams:  · Postural Exam:  Patient presented with the following abnormalities:    · -       Rounded shoulders  · -       Forward head  · Sensation:    · -       Intact  light/touch BLEs  · Skin Integrity/Edema:      · -       Skin integrity: Visible skin intact  · -       Edema: None noted BLEs  · RLE ROM: WNL  · RLE Strength: WNL  · LLE ROM: WNL  · LLE Strength: WNL    Functional Mobility:       Bed Mobility     Supine to sit: supervision     Transfers   N/T 2/2 pt refusal      Gait    N/T 2/2 pt refusal; pt stating that he will ambulate with wife in hallways            AM-PAC 6 CLICK MOBILITY  Total Score:21     Therapeutic Activities and Exercises:  Pt and wife educated on:  - role of PT and discharge from therapy   - safety with mobility and fall risk     Pt verbalized understanding. Pt and wife expressed no further concerns/questions.       Patient left HOB elevated with all lines intact, call button in reach, RN notified and wife present.    GOALS:    Physical Therapy Goals     Not on file          Multidisciplinary Problems (Resolved)        Problem: Physical Therapy Goal    Goal Priority Disciplines Outcome Goal Variances Interventions   Physical Therapy Goal   (Resolved)     PT/OT, PT Outcome(s) achieved                     History:     Past Medical History:   Diagnosis Date    Cardiomyopathy     Hyperlipidemia     Hypertension     Obesity      S/P implantation of automatic cardioverter/defibrillator (AICD)     Ventricular tachycardia        Past Surgical History:   Procedure Laterality Date    CARDIAC CATHETERIZATION      CARDIAC DEFIBRILLATOR PLACEMENT      LEFT VENTRICULAR ASSIST DEVICE  07/27/2017       Clinical Decision Making:     History  Co-morbidities and personal factors that may impact the plan of care Examination  Body Structures and Functions, activity limitations and participation restrictions that may impact the plan of care Clinical Presentation   Decision Making/ Complexity Score   Co-morbidities:   [] Time since onset of injury / illness / exacerbation  [] Status of current condition  [x]Patient's cognitive status and safety concerns    [] Multiple Medical Problems (see med hx)  Personal Factors:   [] Patient's age  [] Prior Level of function   [] Patient's home situation (environment and family support)  [x] Patient's level of motivation  [] Expected progression of patient      HISTORY:(criteria)    [] 99307 - no personal factors/history    [x] 49543 - has 1-2 personal factor/comorbidity     [] 01250 - has >3 personal factor/comorbidity     Body Regions:  [x] Objective examination findings  [] Head     []  Neck  [] Trunk   [] Upper Extremity  [x] Lower Extremity    Body Systems:  [x] For communication ability, affect, cognition, language, and learning style: the assessment of the ability to make needs known, consciousness, orientation (person, place, and time), expected emotional /behavioral responses, and learning preferences (eg, learning barriers, education  needs)  [] For the neuromuscular system: a general assessment of gross coordinated movement (eg, balance, gait, locomotion, transfers, and transitions) and motor function  (motor control and motor learning)  [x] For the musculoskeletal system: the assessment of gross symmetry, gross range of motion, gross strength, height, and weight  [] For the integumentary system: the  assessment of pliability(texture), presence of scar formation, skin color, and skin integrity  [] For cardiovascular/pulmonary system: the assessment of heart rate, respiratory rate, blood pressure, and edema     Activity limitations:    [x] Patient's cognitive status and saf ety concerns          [] Status of current condition      [] Weight bearing restriction  [x] Cardiopulmunary Restriction    Participation Restrictions:   [x] Goals and goal agreement with the patient     [] Rehab potential (prognosis) and probable outcome      Examination of Body System: (criteria)    [x] 42825 - addressing 1-2 elements    [] 12796 - addressing a total of 3 or more elements     [] 73238 -  Addressing a total of 4 or more elements         Clinical Presentation: (criteria)  Stable - 37286     On examination of body system using standardized tests and measures patient presents with 1-2 elements from any of the following: body structures and functions, activity limitations, and/or participation restrictions.  Leading to a clinical presentation that is considered stable and/or uncomplicated                              Clinical Decision Making  (Eval Complexity):  Low- 63659     Time Tracking:     PT Received On: 12/11/17  PT Start Time: 0812     PT Stop Time: 0821     *Pt requesting therapist return for further mobility assessment. Therapist returned from [id number].     PT Total Time (min): 19 min     Billable Minutes: Evaluation 19 min    Tonja Vallecillo PT, DPT   12/11/2017  Pager: [phone]

## 2017-12-11 NOTE — PROGRESS NOTES
[facility]  Cardiothoracic Surgery  Progress Note    Patient Name: Suman Hayden  MRN: [id number]  Admission Date: 12/6/2017  Hospital Length of Stay: 5 days  Code Status: Full Code   Attending Physician: Graciela Bautista MD   Referring Provider: Anige Torres MD  Principal Problem:<principal problem not specified>    Subjective:     Post-Op Info:  Procedure(s) (LRB):  ESOPHAGOGASTRODUODENOSCOPY (EGD) (N/A)   3 Days Post-Op     Subjective:     Post-Op Info:  Procedure(s) (LRB):  ESOPHAGOGASTRODUODENOSCOPY (EGD) (N/A)   3 Days Post-Op          Medications:  Continuous Infusions:   heparin 19421 units/250 mL dextrose 5% mixture (NO NOMOGRAM) 10 Units/kg/hr (12/11/17 1157)     Scheduled Meds:   amLODIPine  10 mg Oral Daily    furosemide  40 mg Oral BID    hydrALAZINE  50 mg Oral Q8H    magnesium oxide  400 mg Oral BID    pantoprazole  40 mg Intravenous BID    pravastatin  20 mg Oral QHS     PRN Meds:sodium chloride, sodium chloride, ALPRAZolam, ondansetron, sodium chloride 0.9%     Objective:     Vital Signs (Most Recent):  Temp: 98.4 °F (36.9 °C) (12/11/17 1135)  Pulse: 80 (12/11/17 1135)  Resp: 18 (12/11/17 1135)  BP: (!) 64/0 (12/11/17 1200)  SpO2: 97 % (12/11/17 1135) Vital Signs (24h Range):  Temp:  [97.6 °F (36.4 °C)-98.9 °F (37.2 °C)] 98.4 °F (36.9 °C)  Pulse:  [78-82] 80  Resp:  [16-18] 18  SpO2:  [96 %-99 %] 97 %  BP: ()/(0-64) 64/0       Intake/Output Summary (Last 24 hours) at 12/11/17 1405  Last data filed at 12/11/17 1100   Gross per 24 hour   Intake              240 ml   Output             1950 ml   Net            -1710 ml       Physical Exam   Constitutional: He is oriented to person, place, and time. He appears well-developed and well-nourished.   HENT:   Head: Normocephalic and atraumatic.   Eyes: EOM are normal. Pupils are equal, round, and reactive to light.   Neck: Normal range of motion. Neck supple.   Cardiovascular: Normal rate.    Pulmonary/Chest: Effort  normal and breath sounds normal.   Abdominal: Soft.   Musculoskeletal: Normal range of motion.   Neurological: He is alert and oriented to person, place, and time.   Skin: Skin is warm and dry.   Nursing note and vitals reviewed.            Assessment/Plan:     LVAD (left ventricular assist device) present    Daily E and M and VAD Interrogation Note    Reason for Visit:  Patient is seen in follow up for management of:  [x] HeartMate III    Interval History:  The [x] implant date was 7/27/2017  Events overnight reviewed     Review of Systems:  Positive for anemia requiring 5 units of PBC's this admission  Denies shortness of breath.  All other systems reviewed and negative    Medications:  Current Facility-Administered Medications   Medication Dose Route Frequency Provider Last Rate Last Dose    0.9%  NaCl infusion (for blood administration)   Intravenous Q24H PRN MELISSA Wilson        0.9%  NaCl infusion (for blood administration)   Intravenous Q24H PRN MELISSA Jon        ALPRAZolam tablet 0.25 mg  0.25 mg Oral BID PRN Abigail Green PA-C        amLODIPine tablet 10 mg  10 mg Oral Daily MELISSA Wilson   10 mg at 12/11/17 0950    furosemide tablet 40 mg  40 mg Oral BID MELISSA Wilson   40 mg at 12/11/17 0950    heparin 88504 units/250 mL dextrose 5% mixture (NO NOMOGRAM)  10 Units/kg/hr Intravenous Continuous Graciela Bautista MD 6.1 mL/hr at 12/11/17 1157 10 Units/kg/hr at 12/11/17 1157    hydrALAZINE tablet 50 mg  50 mg Oral Q8H MELISSA Wilson   50 mg at 12/11/17 0529    magnesium oxide tablet 400 mg  400 mg Oral BID MELISSA Wilson   400 mg at 12/11/17 0950    ondansetron injection 8 mg  8 mg Intravenous Q8H PRN MELISSA Jon   8 mg at 12/08/17 0916    pantoprazole injection 40 mg  40 mg Intravenous BID Jin Franklin MD   40 mg at 12/11/17 0950    pravastatin tablet 20 mg  20 mg Oral QHS MELISSA Wilson   20 mg at 12/10/17 2100     sodium chloride 0.9% flush 5 mL  5 mL Intravenous PRN MELISSA Jon           Physical Examination:  Vital Signs:   Vitals:    12/11/17 1200   BP: (!) 64/0   Pulse:    Resp:    Temp:      Cardiovascular:  [x] Regular rate and rhythm [] Irregular []  None (MATT) []  Other  [x]  No edema []  Edema present  [x]  Clear to auscultation  VAD hum smooth  Skin:  Incision is [x]  Clean, dry and intact.    Sternum:  [x]  Stable   Driveline(s):   [x]  Clean, dry and intact.       Procedure:  Device Interrogation including analysis of device parameters.  Current Settings   Review of device function is [x]  Stable     TXP LVAD INTERROGATIONS 12/11/2017 12/11/2017 12/11/2017 12/11/2017 12/11/2017 12/10/2017 12/10/2017   Type HeartMate3 HeartMate3 (No Data) HeartMate3 HeartMate3 HeartMate3 HeartMate3   Flow 4.1 4.0 - 4.2 3.9 3.9 4.0   Speed 5200 5200 - 5200 5200 5200 5200   PI 3.5 3.6 - 3.7 3.7 3.7 3.5   Power ([place]) 3.5 3.5 - 3.5 3.4 3.6 3.6   LSL - - - - - - -   Pulsatility - - - Pulse Pulse Pulse -   Some recent data might be hidden       Assessment:  [x]  Primary Cardiomyopathy [x]  Congestive Heart Failure   []  Atrial Fibrillation []  Ventricular Tachycardia   []  Aftercare cardiac device [x]  Long term (current) use of anticoagulants   []  Ventilator-associated pneumonia []  Pneumonia viral, unspecified   []  Pneumonia, bacterial, unspecified []  Pneumonia, organism unspecified   [x]  Hemorrhage of GI tract, unspecified    []  Nosebleed []  Driveline infection      [x]  anemia         Plan:  []  Interval history obtained from ICU attending team member during rounding today  []  VAD/MATT teaching performed with patient  []  Mobilization / Physical Therapy ongoing  [x]  Anticoagulation [x]  Ongoing []  Held        Total time spent was 35 minutes.  Of which more than 50 percent of the care dominated counseling and coordinating care with different team members. The VAD was interrogated and all parameters were WNL and no  significant findings were found in the history. All these findings are documented in the note above.      Date of Service: 12/11/2017                           Nadia Caldera NP  Cardiothoracic Surgery  [facility]

## 2017-12-11 NOTE — PLAN OF CARE
Problem: Patient Care Overview  Goal: Plan of Care Review  Plan of care discussed with patient. Patient is free of fall/trauma/injury. Denies CP, SOB, or pain/discomfort. On clear liquid diet. Video capsule tomorrow. All questions addressed. Will continue to monitor

## 2017-12-11 NOTE — ASSESSMENT & PLAN NOTE
Daily E and M and VAD Interrogation Note    Reason for Visit:  Patient is seen in follow up for management of:  [x] HeartMate III    Interval History:  The [x] implant date was 7/27/2017  Events overnight reviewed     Review of Systems:  Positive for anemia requiring 5 units of PBC's this admission  Denies shortness of breath.  All other systems reviewed and negative    Medications:  Current Facility-Administered Medications   Medication Dose Route Frequency Provider Last Rate Last Dose    0.9%  NaCl infusion (for blood administration)   Intravenous Q24H PRN MELISSA Wilson        0.9%  NaCl infusion (for blood administration)   Intravenous Q24H PRN MELISSA Jon        ALPRAZolam tablet 0.25 mg  0.25 mg Oral BID PRN Abigail Green PA-C        amLODIPine tablet 10 mg  10 mg Oral Daily MELISSA Wilson   10 mg at 12/11/17 0950    furosemide tablet 40 mg  40 mg Oral BID MELISSA Wilson   40 mg at 12/11/17 0950    heparin 97265 units/250 mL dextrose 5% mixture (NO NOMOGRAM)  10 Units/kg/hr Intravenous Continuous Graciela Bautista MD 6.1 mL/hr at 12/11/17 1157 10 Units/kg/hr at 12/11/17 1157    hydrALAZINE tablet 50 mg  50 mg Oral Q8H MELISSA Wilson   50 mg at 12/11/17 0529    magnesium oxide tablet 400 mg  400 mg Oral BID MELISSA Wilson   400 mg at 12/11/17 0950    ondansetron injection 8 mg  8 mg Intravenous Q8H PRN MELISSA Jon   8 mg at 12/08/17 0916    pantoprazole injection 40 mg  40 mg Intravenous BID Jin Franklin MD   40 mg at 12/11/17 0950    pravastatin tablet 20 mg  20 mg Oral QHS MELISSA Wilson   20 mg at 12/10/17 2100    sodium chloride 0.9% flush 5 mL  5 mL Intravenous PRN MELISSA Jon           Physical Examination:  Vital Signs:   Vitals:    12/11/17 1200   BP: (!) 64/0   Pulse:    Resp:    Temp:      Cardiovascular:  [x] Regular rate and rhythm [] Irregular []  None (MATT) []  Other  [x]  No edema []  Edema present  [x]   Clear to auscultation  VAD hum smooth  Skin:  Incision is [x]  Clean, dry and intact.    Sternum:  [x]  Stable   Driveline(s):   [x]  Clean, dry and intact.       Procedure:  Device Interrogation including analysis of device parameters.  Current Settings   Review of device function is [x]  Stable     TXP LVAD INTERROGATIONS 12/11/2017 12/11/2017 12/11/2017 12/11/2017 12/11/2017 12/10/2017 12/10/2017   Type HeartMate3 HeartMate3 (No Data) HeartMate3 HeartMate3 HeartMate3 HeartMate3   Flow 4.1 4.0 - 4.2 3.9 3.9 4.0   Speed 5200 5200 - 5200 5200 5200 5200   PI 3.5 3.6 - 3.7 3.7 3.7 3.5   Power ([place]) 3.5 3.5 - 3.5 3.4 3.6 3.6   LSL - - - - - - -   Pulsatility - - - Pulse Pulse Pulse -   Some recent data might be hidden       Assessment:  [x]  Primary Cardiomyopathy [x]  Congestive Heart Failure   []  Atrial Fibrillation []  Ventricular Tachycardia   []  Aftercare cardiac device [x]  Long term (current) use of anticoagulants   []  Ventilator-associated pneumonia []  Pneumonia viral, unspecified   []  Pneumonia, bacterial, unspecified []  Pneumonia, organism unspecified   [x]  Hemorrhage of GI tract, unspecified    []  Nosebleed []  Driveline infection      [x]  anemia         Plan:  []  Interval history obtained from ICU attending team member during rounding today  []  VAD/MATT teaching performed with patient  []  Mobilization / Physical Therapy ongoing  [x]  Anticoagulation [x]  Ongoing []  Held        Total time spent was 35 minutes.  Of which more than 50 percent of the care dominated counseling and coordinating care with different team members. The VAD was interrogated and all parameters were WNL and no significant findings were found in the history. All these findings are documented in the note above.      Date of Service: 12/11/2017

## 2017-12-11 NOTE — HPI
Mr. Suman Rob is a 66 yo male with a PMHx of NICM (EF 10%) s/p HeartMate 3 Implanted 7/27/2017 as DT, HTN, DLD and recent ICD revision on 11/20/17 with Dr. Vidal (DC ICD placed with active RA/LV leads. RV lead capped during revision) that was complicated by pocket hematoma. He was discharged on 11/22/17 with plan to follow up in the [place] EP clinic in a week but hasn't had follow up yet given the recent thanksgiving holiday. He presented to the  ED today after experiencing 4 shocks this AM while bearing down trying to have a bowel movement. He did not syncopize or have LOC. States he doesn't remember having palpitations immediately before or after event and has not had any additional shocks since the morning of 11/28. At ED his K was 4.0, Mg 2.1, INR 1.9,  (at baseline) and his CXR did not show any signs of lead migration. He was transferred here for further evaluation by EP. Denies recent illnesses or CHF symptoms. Denies dietary / medication non adherence. Of note, has been having some pain at the pocket/hematoma site that he has been taking tylenol with some relief. Denies fevers/chills or rigors.

## 2017-12-11 NOTE — ASSESSMENT & PLAN NOTE
HeartMate 3 Implanted 7/27/17 Speed set at 5200 rpm, Interrogation notable for PI events. DT Not listed d/t Hep B, INR 1.5 this morning will need to restart coumadin today. Remains off ASA and LDH stable in 170s.

## 2017-12-11 NOTE — CARE UPDATE
Rapid Response Follow-up Note    Followed up with patient's for proactive rounding. Doppler BP noted to be 64/0, given LVAD.  Per RN MDs ok with 60-80.    No acute issues at this time.   Reviewed plan of care with primary RN.   Please call Rapid Response RN with any questions or concerns at  [phone]

## 2017-12-11 NOTE — SUBJECTIVE & OBJECTIVE
Subjective:     Post-Op Info:  Procedure(s) (LRB):  ESOPHAGOGASTRODUODENOSCOPY (EGD) (N/A)   3 Days Post-Op          Medications:  Continuous Infusions:   heparin 18475 units/250 mL dextrose 5% mixture (NO NOMOGRAM) 10 Units/kg/hr (12/11/17 1157)     Scheduled Meds:   amLODIPine  10 mg Oral Daily    furosemide  40 mg Oral BID    hydrALAZINE  50 mg Oral Q8H    magnesium oxide  400 mg Oral BID    pantoprazole  40 mg Intravenous BID    pravastatin  20 mg Oral QHS     PRN Meds:sodium chloride, sodium chloride, ALPRAZolam, ondansetron, sodium chloride 0.9%     Objective:     Vital Signs (Most Recent):  Temp: 98.4 °F (36.9 °C) (12/11/17 1135)  Pulse: 80 (12/11/17 1135)  Resp: 18 (12/11/17 1135)  BP: (!) 64/0 (12/11/17 1200)  SpO2: 97 % (12/11/17 1135) Vital Signs (24h Range):  Temp:  [97.6 °F (36.4 °C)-98.9 °F (37.2 °C)] 98.4 °F (36.9 °C)  Pulse:  [78-82] 80  Resp:  [16-18] 18  SpO2:  [96 %-99 %] 97 %  BP: ()/(0-64) 64/0       Intake/Output Summary (Last 24 hours) at 12/11/17 1405  Last data filed at 12/11/17 1100   Gross per 24 hour   Intake              240 ml   Output             1950 ml   Net            -1710 ml       Physical Exam   Constitutional: He is oriented to person, place, and time. He appears well-developed and well-nourished.   HENT:   Head: Normocephalic and atraumatic.   Eyes: EOM are normal. Pupils are equal, round, and reactive to light.   Neck: Normal range of motion. Neck supple.   Cardiovascular: Normal rate.    Pulmonary/Chest: Effort normal and breath sounds normal.   Abdominal: Soft.   Musculoskeletal: Normal range of motion.   Neurological: He is alert and oriented to person, place, and time.   Skin: Skin is warm and dry.   Nursing note and vitals reviewed.

## 2017-12-11 NOTE — PLAN OF CARE
Problem: Physical Therapy Goal  Goal: Physical Therapy Goal  Outcome: Outcome(s) achieved Date Met: 12/11/17  Pt chart reviewed and PT evaluation completed- see note for details. PT orders discontinued. Recommending pt discharge home with no further therapy needs anticipated.     Tonja Vallecillo, PT, DPT   12/11/2017  Pager: [phone]

## 2017-12-11 NOTE — ASSESSMENT & PLAN NOTE
-s/p a total of 5 units prbs, and EGD that was unremarkable. Hb stable at 7.2 today with no merly blood noted. GI is currently following and the plan is to ann VCE on 12/12. For now will continue with PPI and will hold off on iron as pt states he did not tolerate it.

## 2017-12-11 NOTE — SUBJECTIVE & OBJECTIVE
Interval History: feeling well s/p 1 unit yesterday. Hb 7.1    Continuous Infusions:   Scheduled Meds:   amLODIPine  10 mg Oral Daily    furosemide  40 mg Oral BID    hydrALAZINE  50 mg Oral Q8H    magnesium oxide  400 mg Oral BID    pantoprazole  40 mg Intravenous BID    pravastatin  20 mg Oral QHS     PRN Meds:sodium chloride, sodium chloride, ALPRAZolam, ondansetron, sodium chloride 0.9%    Review of patient's allergies indicates:  No Known Allergies  Objective:     Vital Signs (Most Recent):  Temp: 97.6 °F (36.4 °C) (12/11/17 0423)  Pulse: 80 (12/11/17 0700)  Resp: 16 (12/11/17 0423)  BP: (!) 90/0 (12/11/17 0515)  SpO2: 99 % (12/11/17 0423) Vital Signs (24h Range):  Temp:  [97.6 °F (36.4 °C)-99.2 °F (37.3 °C)] 97.6 °F (36.4 °C)  Pulse:  [70-82] 80  Resp:  [16] 16  SpO2:  [96 %-99 %] 99 %  BP: (76-96)/(0-64) 90/0     Patient Vitals for the past 72 hrs (Last 3 readings):   Weight   12/11/17 0800 60.7 kg (133 lb 13.1 oz)   12/10/17 0810 60.8 kg (134 lb 0.6 oz)   12/09/17 0751 61.4 kg (135 lb 4 oz)     Body mass index is 20.35 kg/m².      Intake/Output Summary (Last 24 hours) at 12/11/17 0811  Last data filed at 12/11/17 0500   Gross per 24 hour   Intake              480 ml   Output             2050 ml   Net            -1570 ml       Hemodynamic Parameters:         Physical Exam   Constitutional: He is oriented to person, place, and time. No distress.   HENT:   Head: Normocephalic and atraumatic.   Eyes: No scleral icterus.   Neck: Normal range of motion.   Cardiovascular: Normal rate.    LVAD hum present   Pulmonary/Chest: Effort normal and breath sounds normal.   Abdominal: Soft. Bowel sounds are normal. He exhibits no distension and no mass. There is no tenderness. There is no rebound and no guarding. No hernia.   Musculoskeletal: Normal range of motion. He exhibits no edema.   Neurological: He is alert and oriented to person, place, and time.   Skin: He is not diaphoretic.       Significant  Labs:  CBC:    Recent Labs  Lab 12/09/17  0534 12/10/17  0714 12/11/17 0413   WBC 16.18* 12.73* 8.49   RBC 2.42* 2.49* 2.57*   HGB 6.7* 7.1* 7.2*   HCT 21.0* 21.6* 22.9*    156 159   MCV 87 87 89   MCH 27.7 28.5 28.0   MCHC 31.9* 32.9 31.4*     BNP:    Recent Labs  Lab 12/06/17  0954 12/08/17  0542 12/11/17 0413   * 840* 552*     CMP:    Recent Labs  Lab 12/06/17 0954  12/08/17 0542 12/09/17 0534 12/10/17  0714 12/11/17  0413   GLU 93  < > 177* 98 82 80   CALCIUM 9.0  < > 8.9 8.9 8.5* 8.4*   ALBUMIN 2.8*  --  2.7*  --   --  2.7*   PROT 6.5  --  6.0  --   --  5.5*     < > 141 144 142 143   K 3.9  < > 4.0 3.9 3.3* 3.4*   CO2 28  < > 20* 24 28 29     < > 107 108 108 109   BUN 19  < > 45* 70* 43* 27*   CREATININE 0.9  < > 1.3 1.6* 1.1 0.9   ALKPHOS 61  --  45*  --   --  44*   ALT 8*  --  6*  --   --  10   AST 14  --  11  --   --  16   BILITOT 0.5  --  0.8  --   --  0.8   < > = values in this interval not displayed.   Coagulation:     Recent Labs  Lab 12/09/17 0534 12/10/17  0714 12/11/17  0413   INR 2.8* 2.2* 1.5*   APTT 25.4 26.9 24.6     LDH:    Recent Labs  Lab 12/09/17  0534 12/10/17  0714 12/11/17 0413    163 173     Microbiology:  Microbiology Results (last 7 days)     ** No results found for the last 168 hours. **          I have reviewed all pertinent labs within the past 24 hours.    Estimated Creatinine Clearance: 68.4 mL/min (based on SCr of 0.9 mg/dL).    Diagnostic Results:  I have reviewed and interpreted all pertinent imaging results/findings within the past 24 hours.

## 2017-12-11 NOTE — PROGRESS NOTES
I have personally taken the history and examined this patient and agree with the fellow's note as stated above.    Angie Torres MD  [facility] Staff

## 2017-12-11 NOTE — PLAN OF CARE
Problem: Patient Care Overview  Goal: Plan of Care Review  Outcome: Revised  Plan of care discussed with patient and wife.Team to decide cscope vs VCE study. H&H and vital signs stable today. Patient denies any further bowel movements. Patient is free of fall/trauma/injury. Denies CP, SOB, or pain/discomfort. All questions addressed. Will continue to monitor.

## 2017-12-11 NOTE — PROGRESS NOTES
[facility]  Heart Transplant  Progress Note    Patient Name: Suman Hayden  MRN: [id number]  Admission Date: 12/6/2017  Hospital Length of Stay: 5 days  Attending Physician: Jin Franklin MD  Primary Care Provider: Joe Ernst MD  Principal Problem:<principal problem not specified>    Subjective:     Interval History: feeling well s/p 1 unit yesterday. Hb 7.1    Continuous Infusions:   Scheduled Meds:   amLODIPine  10 mg Oral Daily    furosemide  40 mg Oral BID    hydrALAZINE  50 mg Oral Q8H    magnesium oxide  400 mg Oral BID    pantoprazole  40 mg Intravenous BID    pravastatin  20 mg Oral QHS     PRN Meds:sodium chloride, sodium chloride, ALPRAZolam, ondansetron, sodium chloride 0.9%    Review of patient's allergies indicates:  No Known Allergies  Objective:     Vital Signs (Most Recent):  Temp: 97.6 °F (36.4 °C) (12/11/17 0423)  Pulse: 80 (12/11/17 0700)  Resp: 16 (12/11/17 0423)  BP: (!) 90/0 (12/11/17 0515)  SpO2: 99 % (12/11/17 0423) Vital Signs (24h Range):  Temp:  [97.6 °F (36.4 °C)-99.2 °F (37.3 °C)] 97.6 °F (36.4 °C)  Pulse:  [70-82] 80  Resp:  [16] 16  SpO2:  [96 %-99 %] 99 %  BP: (76-96)/(0-64) 90/0     Patient Vitals for the past 72 hrs (Last 3 readings):   Weight   12/11/17 0800 60.7 kg (133 lb 13.1 oz)   12/10/17 0810 60.8 kg (134 lb 0.6 oz)   12/09/17 0751 61.4 kg (135 lb 4 oz)     Body mass index is 20.35 kg/m².      Intake/Output Summary (Last 24 hours) at 12/11/17 0811  Last data filed at 12/11/17 0500   Gross per 24 hour   Intake              480 ml   Output             2050 ml   Net            -1570 ml       Hemodynamic Parameters:         Physical Exam   Constitutional: He is oriented to person, place, and time. No distress.   HENT:   Head: Normocephalic and atraumatic.   Eyes: No scleral icterus.   Neck: Normal range of motion.   Cardiovascular: Normal rate.    LVAD hum present   Pulmonary/Chest: Effort normal and breath sounds normal.   Abdominal: Soft.  Bowel sounds are normal. He exhibits no distension and no mass. There is no tenderness. There is no rebound and no guarding. No hernia.   Musculoskeletal: Normal range of motion. He exhibits no edema.   Neurological: He is alert and oriented to person, place, and time.   Skin: He is not diaphoretic.       Significant Labs:  CBC:    Recent Labs  Lab 12/09/17 0534 12/10/17  0714 12/11/17 0413   WBC 16.18* 12.73* 8.49   RBC 2.42* 2.49* 2.57*   HGB 6.7* 7.1* 7.2*   HCT 21.0* 21.6* 22.9*    156 159   MCV 87 87 89   MCH 27.7 28.5 28.0   MCHC 31.9* 32.9 31.4*     BNP:    Recent Labs  Lab 12/06/17 0954 12/08/17 0542 12/11/17 0413   * 840* 552*     CMP:    Recent Labs  Lab 12/06/17 0954  12/08/17 0542 12/09/17 0534 12/10/17  0714 12/11/17 0413   GLU 93  < > 177* 98 82 80   CALCIUM 9.0  < > 8.9 8.9 8.5* 8.4*   ALBUMIN 2.8*  --  2.7*  --   --  2.7*   PROT 6.5  --  6.0  --   --  5.5*     < > 141 144 142 143   K 3.9  < > 4.0 3.9 3.3* 3.4*   CO2 28  < > 20* 24 28 29     < > 107 108 108 109   BUN 19  < > 45* 70* 43* 27*   CREATININE 0.9  < > 1.3 1.6* 1.1 0.9   ALKPHOS 61  --  45*  --   --  44*   ALT 8*  --  6*  --   --  10   AST 14  --  11  --   --  16   BILITOT 0.5  --  0.8  --   --  0.8   < > = values in this interval not displayed.   Coagulation:     Recent Labs  Lab 12/09/17 0534 12/10/17  0714 12/11/17 0413   INR 2.8* 2.2* 1.5*   APTT 25.4 26.9 24.6     LDH:    Recent Labs  Lab 12/09/17  0534 12/10/17  0714 12/11/17  0413    163 173     Microbiology:  Microbiology Results (last 7 days)     ** No results found for the last 168 hours. **          I have reviewed all pertinent labs within the past 24 hours.    Estimated Creatinine Clearance: 68.4 mL/min (based on SCr of 0.9 mg/dL).    Diagnostic Results:  I have reviewed and interpreted all pertinent imaging results/findings within the past 24 hours.    Assessment and Plan:     No notes on file    Normocytic anemia    -s/p a total of 5  units prbs, and EGD that was unremarkable. Hb stable at 7.2 today with no merly blood noted. GI is currently following and the plan is to ann VCE on 12/12. For now will continue with PPI and will hold off on iron as pt states he did not tolerate it.           LVAD (left ventricular assist device) present    HeartMate 3 Implanted 7/27/17 Speed set at 5200 rpm, Interrogation notable for PI events. DT Not listed d/t Hep B, INR 1.5 this morning will need to restart coumadin today. Remains off ASA and LDH stable in 170s.           Atrial tachycardia    Continue to monitor        Essential hypertension    -restarted home regimen; will monitor response            Rai Krishna MD  Heart Transplant  [facility]

## 2017-12-12 NOTE — SUBJECTIVE & OBJECTIVE
Post-Op Info:  Procedure(s) (LRB):  ESOPHAGOGASTRODUODENOSCOPY (EGD) (N/A)   4 Days Post-Op          Medications:  Continuous Infusions:   heparin 07823 units/250 mL dextrose 5% mixture (NO NOMOGRAM) 15 Units/kg/hr (12/12/17 1009)     Scheduled Meds:   amLODIPine  10 mg Oral Daily    furosemide  40 mg Oral BID    hydrALAZINE  50 mg Oral Q8H    magnesium oxide  400 mg Oral BID    pantoprazole  40 mg Intravenous BID    pravastatin  20 mg Oral QHS     PRN Meds:sodium chloride, sodium chloride, ALPRAZolam, ondansetron, sodium chloride 0.9%     Objective:     Vital Signs (Most Recent):  Temp: 98.7 °F (37.1 °C) (12/12/17 0740)  Pulse: 79 (12/12/17 0700)  Resp: 18 (12/12/17 0740)  BP: (!) 72/0 (12/12/17 0740)  SpO2: 96 % (12/12/17 0740) Vital Signs (24h Range):  Temp:  [98 °F (36.7 °C)-98.7 °F (37.1 °C)] 98.7 °F (37.1 °C)  Pulse:  [79-80] 79  Resp:  [18] 18  SpO2:  [96 %-98 %] 96 %  BP: ()/(0-67) 72/0       Intake/Output Summary (Last 24 hours) at 12/12/17 1110  Last data filed at 12/12/17 0500   Gross per 24 hour   Intake              660 ml   Output             1550 ml   Net             -890 ml       Physical Exam   Constitutional: He is oriented to person, place, and time. He appears well-developed and well-nourished.   HENT:   Head: Normocephalic and atraumatic.   Eyes: EOM are normal. Pupils are equal, round, and reactive to light.   Neck: Normal range of motion.   Cardiovascular: Normal rate.    Pulmonary/Chest: Effort normal and breath sounds normal.   Abdominal: Soft. Bowel sounds are normal.   Musculoskeletal: Normal range of motion.   Neurological: He is alert and oriented to person, place, and time.   Skin: Skin is warm and dry.   Nursing note and vitals reviewed.      Significant Labs:  BMP:   Recent Labs  Lab 12/12/17  0442   GLU 80      K 3.5      CO2 28   BUN 20   CREATININE 0.9   CALCIUM 8.5*   MG 2.5     CBC:   Recent Labs  Lab 12/12/17 0442   WBC 6.90   RBC 2.65*   HGB 7.6*    HCT 24.0*      MCV 91   MCH 28.7   MCHC 31.7*     Coagulation:   Recent Labs  Lab 12/12/17  0442   INR 1.3*   APTT 41.4*

## 2017-12-12 NOTE — PROGRESS NOTES
[facility]  Cardiothoracic Surgery  Progress Note    Patient Name: Suman Hayden  MRN: [id number]  Admission Date: 12/6/2017  Hospital Length of Stay: 6 days  Code Status: Full Code   Attending Physician: Graciela Bautista MD   Referring Provider: Angie Torres MD  Principal Problem:Anemia         Post-Op Info:  Procedure(s) (LRB):  ESOPHAGOGASTRODUODENOSCOPY (EGD) (N/A)   4 Days Post-Op          Post-Op Info:  Procedure(s) (LRB):  ESOPHAGOGASTRODUODENOSCOPY (EGD) (N/A)   4 Days Post-Op          Medications:  Continuous Infusions:   heparin 09729 units/250 mL dextrose 5% mixture (NO NOMOGRAM) 15 Units/kg/hr (12/12/17 1009)     Scheduled Meds:   amLODIPine  10 mg Oral Daily    furosemide  40 mg Oral BID    hydrALAZINE  50 mg Oral Q8H    magnesium oxide  400 mg Oral BID    pantoprazole  40 mg Intravenous BID    pravastatin  20 mg Oral QHS     PRN Meds:sodium chloride, sodium chloride, ALPRAZolam, ondansetron, sodium chloride 0.9%     Objective:     Vital Signs (Most Recent):  Temp: 98.7 °F (37.1 °C) (12/12/17 0740)  Pulse: 79 (12/12/17 0700)  Resp: 18 (12/12/17 0740)  BP: (!) 72/0 (12/12/17 0740)  SpO2: 96 % (12/12/17 0740) Vital Signs (24h Range):  Temp:  [98 °F (36.7 °C)-98.7 °F (37.1 °C)] 98.7 °F (37.1 °C)  Pulse:  [79-80] 79  Resp:  [18] 18  SpO2:  [96 %-98 %] 96 %  BP: ()/(0-67) 72/0       Intake/Output Summary (Last 24 hours) at 12/12/17 1110  Last data filed at 12/12/17 0500   Gross per 24 hour   Intake              660 ml   Output             1550 ml   Net             -890 ml       Physical Exam   Constitutional: He is oriented to person, place, and time. He appears well-developed and well-nourished.   HENT:   Head: Normocephalic and atraumatic.   Eyes: EOM are normal. Pupils are equal, round, and reactive to light.   Neck: Normal range of motion.   Cardiovascular: Normal rate.    Pulmonary/Chest: Effort normal and breath sounds normal.   Abdominal: Soft. Bowel sounds are  normal.   Musculoskeletal: Normal range of motion.   Neurological: He is alert and oriented to person, place, and time.   Skin: Skin is warm and dry.   Nursing note and vitals reviewed.      Significant Labs:  BMP:   Recent Labs  Lab 12/12/17 0442   GLU 80      K 3.5      CO2 28   BUN 20   CREATININE 0.9   CALCIUM 8.5*   MG 2.5     CBC:   Recent Labs  Lab 12/12/17 0442   WBC 6.90   RBC 2.65*   HGB 7.6*   HCT 24.0*      MCV 91   MCH 28.7   MCHC 31.7*     Coagulation:   Recent Labs  Lab 12/12/17 0442   INR 1.3*   APTT 41.4*           Assessment/Plan:     LVAD (left ventricular assist device) present    Daily E and M and VAD Interrogation Note    Reason for Visit:  Patient is seen in follow up for management of:  [x] HeartMate III    Interval History:  The [x] implant date was 7/27/2017  Events overnight reviewed     Review of Systems:  Positive for anemia requiring 5 units of PBC's this admission  Denies shortness of breath.  All other systems reviewed and negative    Medications:  Current Facility-Administered Medications   Medication Dose Route Frequency Provider Last Rate Last Dose    0.9%  NaCl infusion (for blood administration)   Intravenous Q24H PRN MELISSA Wilson        0.9%  NaCl infusion (for blood administration)   Intravenous Q24H PRN MELISSA Jon        ALPRAZolam tablet 0.25 mg  0.25 mg Oral BID PRN Abigail Green PA-C        amLODIPine tablet 10 mg  10 mg Oral Daily MELISSA Wilson   10 mg at 12/12/17 1008    furosemide tablet 40 mg  40 mg Oral BID MELISSA Wilson   40 mg at 12/12/17 1008    heparin 78003 units/250 mL dextrose 5% mixture (NO NOMOGRAM)  15 Units/kg/hr Intravenous Continuous Rai Krishna MD 9.1 mL/hr at 12/12/17 1009 15 Units/kg/hr at 12/12/17 1009    hydrALAZINE tablet 50 mg  50 mg Oral Q8H MELISSA Wilson   50 mg at 12/12/17 0553    magnesium oxide tablet 400 mg  400 mg Oral BID MELISSA Wilson   400 mg at  12/12/17 1008    ondansetron injection 8 mg  8 mg Intravenous Q8H PRN MELISSA Jon   8 mg at 12/08/17 0916    pantoprazole injection 40 mg  40 mg Intravenous BID Jin Franklin MD   40 mg at 12/12/17 1008    pravastatin tablet 20 mg  20 mg Oral QHS MELISSA Wilson   20 mg at 12/11/17 2126    sodium chloride 0.9% flush 5 mL  5 mL Intravenous PRN MELISSA Jon           Physical Examination:  Vital Signs:   Vitals:    12/12/17 0740   BP: (!) 72/0   Pulse:    Resp: 18   Temp: 98.7 °F (37.1 °C)     Cardiovascular:  [x] Regular rate and rhythm [] Irregular []  None (MATT) []  Other  [x]  No edema []  Edema present  [x]  Clear to auscultation  VAD hum smooth  Skin:  Incision is [x]  Clean, dry and intact.    Sternum:  [x]  Stable   Driveline(s):   [x]  Clean, dry and intact.       Procedure:  Device Interrogation including analysis of device parameters.  Current Settings   Review of device function is [x]  Stable     TXP LVAD INTERROGATIONS 12/12/2017 12/12/2017 12/12/2017 12/11/2017 12/11/2017 12/11/2017 12/11/2017   Type HeartMate3 HeartMate3 HeartMate3 HeartMate3 HeartMate3 HeartMate3 HeartMate3   Flow 4.2 4.2 4.1 4.0 4.0 4.1 4.0   Speed 2500 5200 5200 5200 5200 5200 5200   PI 3.5 3.8 3.5 3.8 3.8 3.5 3.6   Power ([place]) 3.5 3.6 3.5 3.5 3.5 3.5 3.5   LSL 4800 4800 4800 4800 - - -   Pulsatility No Pulse Pulse Pulse Pulse - - -   Some recent data might be hidden       Assessment:  [x]  Primary Cardiomyopathy [x]  Congestive Heart Failure   []  Atrial Fibrillation []  Ventricular Tachycardia   []  Aftercare cardiac device [x]  Long term (current) use of anticoagulants   []  Ventilator-associated pneumonia []  Pneumonia viral, unspecified   []  Pneumonia, bacterial, unspecified []  Pneumonia, organism unspecified   [x]  Hemorrhage of GI tract, unspecified    []  Nosebleed []  Driveline infection      [x]  anemia         Plan:  []  Interval history obtained from ICU attending team member during  rounding today  []  VAD/MATT teaching performed with patient  []  Mobilization / Physical Therapy ongoing  [x]  Anticoagulation [x]  Ongoing []  Held        Total time spent was 36 minutes.  Of which more than 50 percent of the care dominated counseling and coordinating care with different team members. The VAD was interrogated and all parameters were WNL and no significant findings were found in the history. All these findings are documented in the note above.      Date of Service: 12/12/2017                           Nadia Caldera NP  Cardiothoracic Surgery  [facility]

## 2017-12-12 NOTE — PLAN OF CARE
Problem: Patient Care Overview  Goal: Plan of Care Review  Outcome: Ongoing (interventions implemented as appropriate)  Updated care plan w/ pt.  Address all questions and concerns throughout shift.  Pt remained free from injury and further skin breakdown. Ordered low air mattress to prevent skin breakdown.  No issues w/ lvad- present w/ musical sound. H&H stable 7.6/24.0.  Pt did have 8hr video capsule done today- results pending.  Pt continue on heparin infusion - increase by 2 units during shift today- currently infusing at 16units- next anti x a due at 223.  Pt continue on a clear liquid diet. Pt is progressing toward goals

## 2017-12-12 NOTE — ASSESSMENT & PLAN NOTE
-s/p a total of 5 units prbs, and EGD that was unremarkable. Hb stable at 7.2 today with no merly blood noted. GI is currently following and VCE started this am will finish around 3. For now will continue with PPI and will hold off on iron as pt states he did not tolerate it.

## 2017-12-12 NOTE — ASSESSMENT & PLAN NOTE
Daily E and M and VAD Interrogation Note    Reason for Visit:  Patient is seen in follow up for management of:  [x] HeartMate III    Interval History:  The [x] implant date was 7/27/2017  Events overnight reviewed     Review of Systems:  Positive for anemia requiring 5 units of PBC's this admission  Denies shortness of breath.  All other systems reviewed and negative    Medications:  Current Facility-Administered Medications   Medication Dose Route Frequency Provider Last Rate Last Dose    0.9%  NaCl infusion (for blood administration)   Intravenous Q24H PRN MELISSA Wilson        0.9%  NaCl infusion (for blood administration)   Intravenous Q24H PRN MELISSA Jon        ALPRAZolam tablet 0.25 mg  0.25 mg Oral BID PRN Abigail Green PA-C        amLODIPine tablet 10 mg  10 mg Oral Daily MELISSA Wilson   10 mg at 12/12/17 1008    furosemide tablet 40 mg  40 mg Oral BID MELISSA Wilson   40 mg at 12/12/17 1008    heparin 39206 units/250 mL dextrose 5% mixture (NO NOMOGRAM)  15 Units/kg/hr Intravenous Continuous Rai Krishna MD 9.1 mL/hr at 12/12/17 1009 15 Units/kg/hr at 12/12/17 1009    hydrALAZINE tablet 50 mg  50 mg Oral Q8H MELISSA Wilson   50 mg at 12/12/17 0553    magnesium oxide tablet 400 mg  400 mg Oral BID MELISSA Wilson   400 mg at 12/12/17 1008    ondansetron injection 8 mg  8 mg Intravenous Q8H PRN MELISSA Jon   8 mg at 12/08/17 0916    pantoprazole injection 40 mg  40 mg Intravenous BID Jin Franklin MD   40 mg at 12/12/17 1008    pravastatin tablet 20 mg  20 mg Oral QHS MELISSA Wilson   20 mg at 12/11/17 2126    sodium chloride 0.9% flush 5 mL  5 mL Intravenous PRN MELISSA Jon           Physical Examination:  Vital Signs:   Vitals:    12/12/17 0740   BP: (!) 72/0   Pulse:    Resp: 18   Temp: 98.7 °F (37.1 °C)     Cardiovascular:  [x] Regular rate and rhythm [] Irregular []  None (MATT) []  Other  [x]  No edema []   Edema present  [x]  Clear to auscultation  VAD hum smooth  Skin:  Incision is [x]  Clean, dry and intact.    Sternum:  [x]  Stable   Driveline(s):   [x]  Clean, dry and intact.       Procedure:  Device Interrogation including analysis of device parameters.  Current Settings   Review of device function is [x]  Stable     TXP LVAD INTERROGATIONS 12/12/2017 12/12/2017 12/12/2017 12/11/2017 12/11/2017 12/11/2017 12/11/2017   Type HeartMate3 HeartMate3 HeartMate3 HeartMate3 HeartMate3 HeartMate3 HeartMate3   Flow 4.2 4.2 4.1 4.0 4.0 4.1 4.0   Speed 2500 5200 5200 5200 5200 5200 5200   PI 3.5 3.8 3.5 3.8 3.8 3.5 3.6   Power ([place]) 3.5 3.6 3.5 3.5 3.5 3.5 3.5   LSL 4800 4800 4800 4800 - - -   Pulsatility No Pulse Pulse Pulse Pulse - - -   Some recent data might be hidden       Assessment:  [x]  Primary Cardiomyopathy [x]  Congestive Heart Failure   []  Atrial Fibrillation []  Ventricular Tachycardia   []  Aftercare cardiac device [x]  Long term (current) use of anticoagulants   []  Ventilator-associated pneumonia []  Pneumonia viral, unspecified   []  Pneumonia, bacterial, unspecified []  Pneumonia, organism unspecified   [x]  Hemorrhage of GI tract, unspecified    []  Nosebleed []  Driveline infection      [x]  anemia         Plan:  []  Interval history obtained from ICU attending team member during rounding today  []  VAD/MATT teaching performed with patient  []  Mobilization / Physical Therapy ongoing  [x]  Anticoagulation [x]  Ongoing []  Held        Total time spent was 36 minutes.  Of which more than 50 percent of the care dominated counseling and coordinating care with different team members. The VAD was interrogated and all parameters were WNL and no significant findings were found in the history. All these findings are documented in the note above.      Date of Service: 12/12/2017

## 2017-12-12 NOTE — PLAN OF CARE
Problem: Patient Care Overview  Goal: Plan of Care Review  Outcome: Ongoing (interventions implemented as appropriate)  Pt free of falls, injury this shift. POC reviewed with pt at bedside, verbalized understanding. Plan is for VCE this AM. INR 1.5, heparin gtt without nomogram infusing per MD order, titrating per MD order based on Xa Q6H. Xa goal: 0.3-0.5. VSS, NAD noted. VAD numbers WNL. Denies pain, SOB or chest pain. All questions addressed. Will continue to monitor.

## 2017-12-12 NOTE — SUBJECTIVE & OBJECTIVE
Interval History: Feeling well today in good spirits. No bleeding and hb is stable.     Continuous Infusions:   heparin 93430 units/250 mL dextrose 5% mixture (NO NOMOGRAM) 14 Units/kg/hr (12/12/17 0213)     Scheduled Meds:   amLODIPine  10 mg Oral Daily    furosemide  40 mg Oral BID    hydrALAZINE  50 mg Oral Q8H    magnesium oxide  400 mg Oral BID    pantoprazole  40 mg Intravenous BID    pravastatin  20 mg Oral QHS     PRN Meds:sodium chloride, sodium chloride, ALPRAZolam, ondansetron, sodium chloride 0.9%    Review of patient's allergies indicates:  No Known Allergies  Objective:     Vital Signs (Most Recent):  Temp: 98.7 °F (37.1 °C) (12/12/17 0740)  Pulse: 79 (12/12/17 0700)  Resp: 18 (12/12/17 0740)  BP: (!) 72/0 (12/12/17 0740)  SpO2: 96 % (12/12/17 0740) Vital Signs (24h Range):  Temp:  [98 °F (36.7 °C)-98.7 °F (37.1 °C)] 98.7 °F (37.1 °C)  Pulse:  [79-80] 79  Resp:  [16-18] 18  SpO2:  [96 %-98 %] 96 %  BP: ()/(0-67) 72/0     Patient Vitals for the past 72 hrs (Last 3 readings):   Weight   12/11/17 0800 60.7 kg (133 lb 13.1 oz)   12/10/17 0810 60.8 kg (134 lb 0.6 oz)     Body mass index is 20.35 kg/m².      Intake/Output Summary (Last 24 hours) at 12/12/17 0830  Last data filed at 12/12/17 0500   Gross per 24 hour   Intake             1260 ml   Output             1900 ml   Net             -640 ml       Hemodynamic Parameters:         Physical Exam   Constitutional: He is oriented to person, place, and time. No distress.   HENT:   Head: Normocephalic and atraumatic.   Eyes: No scleral icterus.   Neck: Normal range of motion.   Cardiovascular: Normal rate.    LVAD hum present   Pulmonary/Chest: Effort normal and breath sounds normal.   Abdominal: Soft. Bowel sounds are normal. He exhibits no distension and no mass. There is no tenderness. There is no rebound and no guarding. No hernia.   Musculoskeletal: Normal range of motion. He exhibits no edema.   Neurological: He is alert and oriented to  person, place, and time.   Skin: He is not diaphoretic.       Significant Labs:  CBC:    Recent Labs  Lab 12/10/17  0714 12/11/17  0413 12/12/17  0442   WBC 12.73* 8.49 6.90   RBC 2.49* 2.57* 2.65*   HGB 7.1* 7.2* 7.6*   HCT 21.6* 22.9* 24.0*    159 164   MCV 87 89 91   MCH 28.5 28.0 28.7   MCHC 32.9 31.4* 31.7*     BNP:    Recent Labs  Lab 12/06/17 0954 12/08/17 0542 12/11/17 0413   * 840* 552*     CMP:    Recent Labs  Lab 12/06/17 0954 12/08/17 0542  12/10/17  0714 12/11/17  0413 12/12/17  0442   GLU 93  < > 177*  < > 82 80 80   CALCIUM 9.0  < > 8.9  < > 8.5* 8.4* 8.5*   ALBUMIN 2.8*  --  2.7*  --   --  2.7*  --    PROT 6.5  --  6.0  --   --  5.5*  --      < > 141  < > 142 143 142   K 3.9  < > 4.0  < > 3.3* 3.4* 3.5   CO2 28  < > 20*  < > 28 29 28     < > 107  < > 108 109 106   BUN 19  < > 45*  < > 43* 27* 20   CREATININE 0.9  < > 1.3  < > 1.1 0.9 0.9   ALKPHOS 61  --  45*  --   --  44*  --    ALT 8*  --  6*  --   --  10  --    AST 14  --  11  --   --  16  --    BILITOT 0.5  --  0.8  --   --  0.8  --    < > = values in this interval not displayed.   Coagulation:     Recent Labs  Lab 12/10/17  0714 12/11/17  0413 12/12/17  0442   INR 2.2* 1.5* 1.3*   APTT 26.9 24.6 41.4*     LDH:    Recent Labs  Lab 12/10/17  0714 12/11/17  0413 12/12/17  0442    173 168     Microbiology:  Microbiology Results (last 7 days)     ** No results found for the last 168 hours. **          I have reviewed all pertinent labs within the past 24 hours.    Estimated Creatinine Clearance: 68.4 mL/min (based on SCr of 0.9 mg/dL).    Diagnostic Results:  I have reviewed and interpreted all pertinent imaging results/findings within the past 24 hours.

## 2017-12-12 NOTE — PROGRESS NOTES
"UPDATE    SW to pt's room for update today.  Pt and wife report they are "better" today and are "not fighting anymore."  Pt verbalizing frustration at being NPO again for procedures and not being able to eat during much of hospital stay.  SW providing extensive active listening to pt for his concerns.  Wife states that earlier today, pt said, "If I had a gun, I'd shoot myself."  SW discussed this remark with pt.  Pt denies any passive or active suicidal ideations and reports he made that statement out of frustration at his current situation (repeated hospitalizations, poor rest, not able to eat).  Pt became very agitated during discussion and stated he did not wish to discuss it further.  SW informed Dr. TIERNEY Krishna of discussion with pt.  SW does not recommend psychiatry c/s or further intervention at this time, as pt denies suicidal ideation.    Pt and wife continue to have difficulty coping with pt's current medical condition and continued hospitalization; however, are coping better during today's SW visit than last week.  Pt and wife deny any needs or concerns to SW at this time.  SW providing ongoing psychosocial and counseling support, education, resources, assistance, and discharge planning as indicated.  SW following and remains available.  "

## 2017-12-12 NOTE — NURSING
Resume care from previous nurse, pt voice no complaints, lying in bed w/ bed in lowest position and locked. Spouse is at bedside.  Heparin infusing at 14unit/hr.  Call bell within reach, introduce myself to pt. Will continue to monitor pt status

## 2017-12-12 NOTE — ASSESSMENT & PLAN NOTE
HeartMate 3 Implanted 7/27/17 Speed set at 5200 rpm, Interrogation notable for PI events. DT Not listed d/t Hep B, INR 1.5 12/11 started on heparin not therapeutic yet. Remains off ASA and LDH stable in 170s.

## 2017-12-12 NOTE — PROGRESS NOTES
[facility]  Heart Transplant  Progress Note    Patient Name: Suman Hayden  MRN: [id number]  Admission Date: 12/6/2017  Hospital Length of Stay: 6 days  Attending Physician: Graciela Bautista MD  Primary Care Provider: Joe Ernst MD  Principal Problem:Anemia    Subjective:     Interval History: Feeling well today in good spirits. No bleeding and hb is stable.     Continuous Infusions:   heparin 86751 units/250 mL dextrose 5% mixture (NO NOMOGRAM) 14 Units/kg/hr (12/12/17 0213)     Scheduled Meds:   amLODIPine  10 mg Oral Daily    furosemide  40 mg Oral BID    hydrALAZINE  50 mg Oral Q8H    magnesium oxide  400 mg Oral BID    pantoprazole  40 mg Intravenous BID    pravastatin  20 mg Oral QHS     PRN Meds:sodium chloride, sodium chloride, ALPRAZolam, ondansetron, sodium chloride 0.9%    Review of patient's allergies indicates:  No Known Allergies  Objective:     Vital Signs (Most Recent):  Temp: 98.7 °F (37.1 °C) (12/12/17 0740)  Pulse: 79 (12/12/17 0700)  Resp: 18 (12/12/17 0740)  BP: (!) 72/0 (12/12/17 0740)  SpO2: 96 % (12/12/17 0740) Vital Signs (24h Range):  Temp:  [98 °F (36.7 °C)-98.7 °F (37.1 °C)] 98.7 °F (37.1 °C)  Pulse:  [79-80] 79  Resp:  [16-18] 18  SpO2:  [96 %-98 %] 96 %  BP: ()/(0-67) 72/0     Patient Vitals for the past 72 hrs (Last 3 readings):   Weight   12/11/17 0800 60.7 kg (133 lb 13.1 oz)   12/10/17 0810 60.8 kg (134 lb 0.6 oz)     Body mass index is 20.35 kg/m².      Intake/Output Summary (Last 24 hours) at 12/12/17 0830  Last data filed at 12/12/17 0500   Gross per 24 hour   Intake             1260 ml   Output             1900 ml   Net             -640 ml       Hemodynamic Parameters:         Physical Exam   Constitutional: He is oriented to person, place, and time. No distress.   HENT:   Head: Normocephalic and atraumatic.   Eyes: No scleral icterus.   Neck: Normal range of motion.   Cardiovascular: Normal rate.    LVAD hum present   Pulmonary/Chest:  Effort normal and breath sounds normal.   Abdominal: Soft. Bowel sounds are normal. He exhibits no distension and no mass. There is no tenderness. There is no rebound and no guarding. No hernia.   Musculoskeletal: Normal range of motion. He exhibits no edema.   Neurological: He is alert and oriented to person, place, and time.   Skin: He is not diaphoretic.       Significant Labs:  CBC:    Recent Labs  Lab 12/10/17  0714 12/11/17 0413 12/12/17 0442   WBC 12.73* 8.49 6.90   RBC 2.49* 2.57* 2.65*   HGB 7.1* 7.2* 7.6*   HCT 21.6* 22.9* 24.0*    159 164   MCV 87 89 91   MCH 28.5 28.0 28.7   MCHC 32.9 31.4* 31.7*     BNP:    Recent Labs  Lab 12/06/17  0954 12/08/17  0542 12/11/17 0413   * 840* 552*     CMP:    Recent Labs  Lab 12/06/17  0954  12/08/17  0542  12/10/17  0714 12/11/17 0413 12/12/17 0442   GLU 93  < > 177*  < > 82 80 80   CALCIUM 9.0  < > 8.9  < > 8.5* 8.4* 8.5*   ALBUMIN 2.8*  --  2.7*  --   --  2.7*  --    PROT 6.5  --  6.0  --   --  5.5*  --      < > 141  < > 142 143 142   K 3.9  < > 4.0  < > 3.3* 3.4* 3.5   CO2 28  < > 20*  < > 28 29 28     < > 107  < > 108 109 106   BUN 19  < > 45*  < > 43* 27* 20   CREATININE 0.9  < > 1.3  < > 1.1 0.9 0.9   ALKPHOS 61  --  45*  --   --  44*  --    ALT 8*  --  6*  --   --  10  --    AST 14  --  11  --   --  16  --    BILITOT 0.5  --  0.8  --   --  0.8  --    < > = values in this interval not displayed.   Coagulation:     Recent Labs  Lab 12/10/17  0714 12/11/17 0413 12/12/17 0442   INR 2.2* 1.5* 1.3*   APTT 26.9 24.6 41.4*     LDH:    Recent Labs  Lab 12/10/17  0714 12/11/17  0413 12/12/17  0442    173 168     Microbiology:  Microbiology Results (last 7 days)     ** No results found for the last 168 hours. **          I have reviewed all pertinent labs within the past 24 hours.    Estimated Creatinine Clearance: 68.4 mL/min (based on SCr of 0.9 mg/dL).    Diagnostic Results:  I have reviewed and interpreted all pertinent imaging  results/findings within the past 24 hours.    Assessment and Plan:     No notes on file    Normocytic anemia    -s/p a total of 5 units prbs, and EGD that was unremarkable. Hb stable at 7.2 today with no merly blood noted. GI is currently following and VCE started this am will finish around 3. For now will continue with PPI and will hold off on iron as pt states he did not tolerate it.           LVAD (left ventricular assist device) present    HeartMate 3 Implanted 7/27/17 Speed set at 5200 rpm, Interrogation notable for PI events. DT Not listed d/t Hep B, INR 1.5 12/11 started on heparin not therapeutic yet. Remains off ASA and LDH stable in 170s.           Atrial tachycardia    Continue to monitor        Essential hypertension    -restarted home regimen; will monitor response            Rai Krishna MD  Heart Transplant  [facility]

## 2017-12-12 NOTE — PHYSICIAN QUERY
"PT Name: Suman Hayden  MR #: [id number]    Physician Query Form - Hematology Clarification      CDS/: Jeni Valle               Contact information: [email or web address]     This form is a permanent document in the medical record.      Query Date: December 12, 2017    By submitting this query, we are merely seeking further clarification of documentation. Please utilize your independent clinical judgment when addressing the question(s) below.    The Medical record contains the following:   Indicators  Supporting Clinical Findings Location in Medical Record   X "Anemia" documented Normocytic anemia    H&P 12/8   X H & H = Hemoglobin 5.6-7.6  Hematocrit 18-24 Labs 12/6-12/12    BP =                     HR=     X "GI bleeding" documented Melena  PN 12/8    Acute bleeding (Non GI site)     X Transfusion(s) 5units PRBC Transfusion record    Treatment:      Other:        Provider, please specify diagnosis or diagnoses associated with above clinical findings.      [X  ] Acute blood loss anemia    [  ] Other Hematological Diagnosis (please specify): _________________________________    [  ] Clinically Undetermined       Please document in your progress notes daily for the duration of treatment, until resolved, and include in your discharge summary.                                                                                                      "

## 2017-12-12 NOTE — NURSING
Heparin infusion:    Spoke to Dr. Krishna in reference to pt's anti xa was 0.25- orders given to increase by 1 unit-.  Heparin ncrease to 15units    @1615  aniti xa 0.26- spoke to dr Krishna- orders given to increase heparin by 1 unit- heparin increase to 16units. Next  anti x a due to 6830

## 2017-12-13 NOTE — NURSING
Called by Yuko in laboratory, stated that previuous AntiXa labs run earlier resulted in error. Stated that labs were rerun, and correct numbers would be changed in chart. AntiXa run at 0002 resulted in error of 1.64, lab was re-run and result of 0.30. AntiXa run at 0135 resulted in error at 1.64, lab was re-ran and resulted at 0.31. Due to error, Heparin gtt was held for 1 hour and rate/dose dropped to 12 units/kg/hour at 0216. Dr. Negrete notified, stated to re-initiate drip at 16units/kg/hr and reassess AntiXa as done previously. Informed Dr. Negrete that morning labs were just run, and bloodwork would reflect the Heparin gtt running at 12 units/kg/hr. Will continue to monitor.

## 2017-12-13 NOTE — PROGRESS NOTES
[facility]  Heart Transplant  Progress Note    Patient Name: Suman Hayden  MRN: [id number]  Admission Date: 12/6/2017  Hospital Length of Stay: 7 days  Attending Physician: Graciela Bautista MD  Primary Care Provider: Joe Ernst MD  Principal Problem:Anemia    Subjective:     Interval History: No complaints this morning. Wife says he is hungry and asking when he can eat    Continuous Infusions:   heparin 25907 units/250 mL dextrose 5% mixture (NO NOMOGRAM) 16 Units/kg/hr (12/13/17 0629)     Scheduled Meds:   amLODIPine  10 mg Oral Daily    furosemide  40 mg Oral BID    hydrALAZINE  50 mg Oral Q8H    magnesium oxide  400 mg Oral BID    pantoprazole  40 mg Intravenous BID    polyethylene glycol  17 g Oral Daily    potassium chloride  20 mEq Oral BID    pravastatin  20 mg Oral QHS     PRN Meds:sodium chloride, sodium chloride, ALPRAZolam, ondansetron, sodium chloride 0.9%    Review of patient's allergies indicates:  No Known Allergies  Objective:     Vital Signs (Most Recent):  Temp: 98.3 °F (36.8 °C) (12/13/17 1139)  Pulse: 78 (12/13/17 1200)  Resp: 18 (12/13/17 1139)  BP: (!) 72/0 (12/13/17 1139)  SpO2: 100 % (12/13/17 1139) Vital Signs (24h Range):  Temp:  [98.3 °F (36.8 °C)-99.7 °F (37.6 °C)] 98.3 °F (36.8 °C)  Pulse:  [] 78  Resp:  [18] 18  SpO2:  [93 %-100 %] 100 %  BP: (68-82)/(0) 72/0     Patient Vitals for the past 72 hrs (Last 3 readings):   Weight   12/13/17 0700 60.4 kg (133 lb 2.5 oz)   12/12/17 0700 61.2 kg (134 lb 14.7 oz)   12/11/17 0800 60.7 kg (133 lb 13.1 oz)     Body mass index is 20.25 kg/m².      Intake/Output Summary (Last 24 hours) at 12/13/17 1405  Last data filed at 12/13/17 0500   Gross per 24 hour   Intake           517.21 ml   Output              600 ml   Net           -82.79 ml       Hemodynamic Parameters:         Physical Exam   Constitutional: He is oriented to person, place, and time. No distress.   HENT:   Head: Normocephalic and atraumatic.    Eyes: No scleral icterus.   Neck: Normal range of motion.   Cardiovascular: Normal rate.    LVAD hum present   Pulmonary/Chest: Effort normal and breath sounds normal.   Abdominal: Soft. Bowel sounds are normal. He exhibits no distension and no mass. There is no tenderness. There is no rebound and no guarding. No hernia.   Musculoskeletal: Normal range of motion. He exhibits no edema.   Neurological: He is alert and oriented to person, place, and time.   Skin: He is not diaphoretic.       Significant Labs:  CBC:    Recent Labs  Lab 12/12/17 0442 12/13/17  0537 12/13/17  1225   WBC 6.90 6.18 9.64   RBC 2.65* 2.87* 3.37*   HGB 7.6* 8.2* 9.4*   HCT 24.0* 25.9* 30.1*    187 240   MCV 91 90 89   MCH 28.7 28.6 27.9   MCHC 31.7* 31.7* 31.2*     BNP:    Recent Labs  Lab 12/08/17 0542 12/11/17 0413 12/13/17  0537   * 552* 337*     CMP:    Recent Labs  Lab 12/08/17 0542  12/11/17 0413 12/12/17 0442 12/13/17  0537   *  < > 80 80 76   CALCIUM 8.9  < > 8.4* 8.5* 8.7   ALBUMIN 2.7*  --  2.7*  --  2.6*   PROT 6.0  --  5.5*  --  5.5*     < > 143 142 140   K 4.0  < > 3.4* 3.5 3.4*   CO2 20*  < > 29 28 29     < > 109 106 105   BUN 45*  < > 27* 20 15   CREATININE 1.3  < > 0.9 0.9 1.0   ALKPHOS 45*  --  44*  --  45*   ALT 6*  --  10  --  9*   AST 11  --  16  --  18   BILITOT 0.8  --  0.8  --  0.7   < > = values in this interval not displayed.   Coagulation:     Recent Labs  Lab 12/11/17 0413 12/12/17 0442 12/13/17  0537   INR 1.5* 1.3* 1.2   APTT 24.6 41.4* 25.9     LDH:    Recent Labs  Lab 12/11/17  0413 12/12/17  0442 12/13/17  0537    168 166     Microbiology:  Microbiology Results (last 7 days)     ** No results found for the last 168 hours. **          I have reviewed all pertinent labs within the past 24 hours.    Estimated Creatinine Clearance: 61.2 mL/min (based on SCr of 1 mg/dL).    Diagnostic Results:  I have reviewed and interpreted all pertinent imaging results/findings  within the past 24 hours.    Assessment and Plan:     No notes on file    LVAD (left ventricular assist device) present    -HeartMate 3 Implanted 7/27/17   -Speed set at 5200 rpm  - INR subtherapeutic. Holding coumadin due to GIB. On Heparin drip  -LDH stable.  Holding ASA  - DT, Not listed d/t Hep B          Normocytic anemia    -s/p total of 5 units PRBCs  -GI following. EGD 12/8/17 showed Normal esophagus; Gastritis and duodenitis; No gastrointestinal bleeding seen.    -VCE done yesterday- results pending  -Hb stable today  -Continue with PPI and will hold off on iron as pt states he did not tolerate it.           Atrial tachycardia    Continue to monitor        Essential hypertension    -restarted home regimen- amlodipine and hydralazine; will monitor response            Susannah Elizabeth PA-C  Heart Transplant  [facility]

## 2017-12-13 NOTE — NURSING
AntiXa resulted 1.64 at 0002, up from .26 at 1533. Dr. Negrete notified, ordered a AntiXa redraw STAT and to hold Heparin gtt for 1 hour. Ordered to reassess when results upload to [facility]. Will implement. Will continue to monitor.

## 2017-12-13 NOTE — ASSESSMENT & PLAN NOTE
-HeartMate 3 Implanted 7/27/17   -Speed set at 5200 rpm  - INR subtherapeutic. Holding coumadin due to GIB. On Heparin drip  -LDH stable.  Holding ASA  - DT, Not listed d/t Hep B

## 2017-12-13 NOTE — PLAN OF CARE
Problem: Patient Care Overview  Goal: Plan of Care Review  Outcome: Ongoing (interventions implemented as appropriate)  Pt free of falls, injury this shift. POC reviewed with pt at bedside, verbalized understanding. VCE completed yesterday for a total of 8hrs, results pending. INR 1.3, heparin gtt without nomogram infusing per MD order, titrating per MD order based on Xa Q6H. Xa goal: 0.3-0.5. Xa resulted in error x2, see clinical note. VSS, NAD noted. VAD numbers WNL. Denies pain, SOB or chest pain. All questions addressed. Will continue to monitor.

## 2017-12-13 NOTE — NURSING
AntiXa scheduled to be drawn at 2230 not drawn yet. Spoke to Venipuncture coordinator, stated order crossed over, would send phlebotomist to pt's room stat to draw lab. Will follow up. Will continue to monitor.

## 2017-12-13 NOTE — NURSING
AntiXa resulted at 1.64 at 0135. Heparin gtt currently on hold since 0125, set to resume infusing at 0225 at 16 units/kg/hr @ 9.7mLh/r. Dr. Negrete notified, stated that 0225, resume Heparin gtt at 12units/kg/hr and reassess AntiXa in 6 hrs. Will implement. Will continue to monitor.

## 2017-12-13 NOTE — NURSING
Tubing from heparin drip was not connected to patient's midline.  It was on the floor.  Patient stated that it was disconnected when he was weighed earlier in the morning.  Primed new tubing and restarted drip.  Will recheck Xa at noon.

## 2017-12-13 NOTE — PROGRESS NOTES
There was lab error in Xa level last night which lead to heparin being on hold for about a hour per per protocol. This error was identified this morning and appropriate levels called in by the lab. Heparin was resumed at prior rate of 16units. Repeat Xa being done. Fu on results

## 2017-12-13 NOTE — SUBJECTIVE & OBJECTIVE
Interval History: No complaints this morning. Wife says he is hungry and asking when he can eat    Continuous Infusions:   heparin 07462 units/250 mL dextrose 5% mixture (NO NOMOGRAM) 16 Units/kg/hr (12/13/17 0629)     Scheduled Meds:   amLODIPine  10 mg Oral Daily    furosemide  40 mg Oral BID    hydrALAZINE  50 mg Oral Q8H    magnesium oxide  400 mg Oral BID    pantoprazole  40 mg Intravenous BID    polyethylene glycol  17 g Oral Daily    potassium chloride  20 mEq Oral BID    pravastatin  20 mg Oral QHS     PRN Meds:sodium chloride, sodium chloride, ALPRAZolam, ondansetron, sodium chloride 0.9%    Review of patient's allergies indicates:  No Known Allergies  Objective:     Vital Signs (Most Recent):  Temp: 98.3 °F (36.8 °C) (12/13/17 1139)  Pulse: 78 (12/13/17 1200)  Resp: 18 (12/13/17 1139)  BP: (!) 72/0 (12/13/17 1139)  SpO2: 100 % (12/13/17 1139) Vital Signs (24h Range):  Temp:  [98.3 °F (36.8 °C)-99.7 °F (37.6 °C)] 98.3 °F (36.8 °C)  Pulse:  [] 78  Resp:  [18] 18  SpO2:  [93 %-100 %] 100 %  BP: (68-82)/(0) 72/0     Patient Vitals for the past 72 hrs (Last 3 readings):   Weight   12/13/17 0700 60.4 kg (133 lb 2.5 oz)   12/12/17 0700 61.2 kg (134 lb 14.7 oz)   12/11/17 0800 60.7 kg (133 lb 13.1 oz)     Body mass index is 20.25 kg/m².      Intake/Output Summary (Last 24 hours) at 12/13/17 1405  Last data filed at 12/13/17 0500   Gross per 24 hour   Intake           517.21 ml   Output              600 ml   Net           -82.79 ml       Hemodynamic Parameters:         Physical Exam   Constitutional: He is oriented to person, place, and time. No distress.   HENT:   Head: Normocephalic and atraumatic.   Eyes: No scleral icterus.   Neck: Normal range of motion.   Cardiovascular: Normal rate.    LVAD hum present   Pulmonary/Chest: Effort normal and breath sounds normal.   Abdominal: Soft. Bowel sounds are normal. He exhibits no distension and no mass. There is no tenderness. There is no rebound and no  guarding. No hernia.   Musculoskeletal: Normal range of motion. He exhibits no edema.   Neurological: He is alert and oriented to person, place, and time.   Skin: He is not diaphoretic.       Significant Labs:  CBC:    Recent Labs  Lab 12/12/17 0442 12/13/17  0537 12/13/17  1225   WBC 6.90 6.18 9.64   RBC 2.65* 2.87* 3.37*   HGB 7.6* 8.2* 9.4*   HCT 24.0* 25.9* 30.1*    187 240   MCV 91 90 89   MCH 28.7 28.6 27.9   MCHC 31.7* 31.7* 31.2*     BNP:    Recent Labs  Lab 12/08/17 0542 12/11/17 0413 12/13/17  0537   * 552* 337*     CMP:    Recent Labs  Lab 12/08/17 0542  12/11/17 0413 12/12/17 0442 12/13/17  0537   *  < > 80 80 76   CALCIUM 8.9  < > 8.4* 8.5* 8.7   ALBUMIN 2.7*  --  2.7*  --  2.6*   PROT 6.0  --  5.5*  --  5.5*     < > 143 142 140   K 4.0  < > 3.4* 3.5 3.4*   CO2 20*  < > 29 28 29     < > 109 106 105   BUN 45*  < > 27* 20 15   CREATININE 1.3  < > 0.9 0.9 1.0   ALKPHOS 45*  --  44*  --  45*   ALT 6*  --  10  --  9*   AST 11  --  16  --  18   BILITOT 0.8  --  0.8  --  0.7   < > = values in this interval not displayed.   Coagulation:     Recent Labs  Lab 12/11/17 0413 12/12/17 0442 12/13/17  0537   INR 1.5* 1.3* 1.2   APTT 24.6 41.4* 25.9     LDH:    Recent Labs  Lab 12/11/17 0413 12/12/17 0442 12/13/17  0537    168 166     Microbiology:  Microbiology Results (last 7 days)     ** No results found for the last 168 hours. **          I have reviewed all pertinent labs within the past 24 hours.    Estimated Creatinine Clearance: 61.2 mL/min (based on SCr of 1 mg/dL).    Diagnostic Results:  I have reviewed and interpreted all pertinent imaging results/findings within the past 24 hours.

## 2017-12-13 NOTE — ASSESSMENT & PLAN NOTE
-s/p total of 5 units PRBCs  -GI following. EGD 12/8/17 showed Normal esophagus; Gastritis and duodenitis; No gastrointestinal bleeding seen.    -VCE done yesterday- results pending  -Hb stable today  -Continue with PPI and will hold off on iron as pt states he did not tolerate it.

## 2017-12-13 NOTE — NURSING
Per Cara Kim RN, a nomogram is the recommended protocol to adjust dose/rate for Heparin.  MICHAEL Santana notified.  No new changes at this time.  Xa = .30.

## 2017-12-14 NOTE — NURSING TRANSFER
Nursing Transfer Note      12/14/2017     Transfer To: Endoscopy    Transfer via stretcher    Transfer with cardiac monitoring, LVAD Emergency Equipment, Monitor/Computer    Transported by [email or web address].    Medicines sent: No    Chart send with patient: Yes    Notified: spouse

## 2017-12-14 NOTE — PROGRESS NOTES
Ravin called ([phone]) and apprised of missing postop orders.  Savannah acharya) will call Dr Chatterjee and have him place postop orders.

## 2017-12-14 NOTE — NURSING
Patient is complaining of numbness and tingling in both hands.  Notified HTS (Giovanni Hayden M.D.).

## 2017-12-14 NOTE — PROVATION PATIENT INSTRUCTIONS
Discharge Summary/Instructions for after Video Capsule Endoscopy  Patient Name: Suman Hayden  Patient MRN: [id number]  Patient YOB: 1950 Tuesday, December 12, 2017  Husam Cantrell MD  This is a 67 year old male.  1.  Do Not eat or drink anything for 1 hour.  Try sips of water first.  If   tolerated, resume your regular diet or one recommended by your physician.  2.  Do not drive, operate machinery, make critical decisions, or do   activities that require coordination or balance for 24 hours.  3.   You may experience a sore throat for 24 to 48 hours.  You may use   throat lozenges or gargle with warm salt water to relieve the discomfort.  4.  Because air was put into your stomach during the procedure, you may   experience some belching.  5.  Do not use any medication containing aspirin for 10 days.  6.  Go directly to the emergency room if you notice any of the following:   Chills and/or fever over 101 F   Persistent vomiting or vomiting with blood/nasal regurgitation   Severe abdominal pain, other than gas cramps   Severe chest pain   Black, tarry stools     Your doctor recommends these additional instructions:  None  If you have any questions or problems, please call your physician.  EMERGENCY PHONE NUMBER: [phone]  LAB RESULTS: [phone]  Husam Cantrell MD  12/14/2017 8:50:31 AM  This report has been verified and signed electronically.

## 2017-12-14 NOTE — NURSING
Patient returned from PACU at 1625 hours.  NORA Velazquez PA-C, ordered to restart Heparin and administer Coumadin 3 mg.

## 2017-12-14 NOTE — ASSESSMENT & PLAN NOTE
-HeartMate 3 Implanted 7/27/17   -Speed set at 5200 rpm  - INR subtherapeutic. Holding coumadin due to GIB and for GI procedure today. On Heparin drip  -LDH stable.  Holding ASA  - DT, Not listed d/t Hep B

## 2017-12-14 NOTE — NURSING
Patient is alert and oriented x 4.  VSS.  Patient is agitated that he is NPO, and that he has not received results from video capsule.  Attempted x 2 to secure walker with wheels yesterday, but [employer], [phone],  reported a shortage.  Called Markos, C&D, to request walker with wheels as soon as possible.

## 2017-12-14 NOTE — TRANSFER OF CARE
"Anesthesia Transfer of Care Note    Patient: Suman Hayden    Procedure(s) Performed: Procedure(s) (LRB):  DEVICE ASSISTED ENTEROSCOPY-ANTEROGRADE (N/A)    Patient location: PACU    Anesthesia Type: general    Transport from OR: Transported from OR on 6-10 L/min O2 by face mask with adequate spontaneous ventilation    Post pain: adequate analgesia    Post assessment: no apparent anesthetic complications    Post vital signs: stable    Level of consciousness: awake and alert    Nausea/Vomiting: no nausea/vomiting    Complications: none    Transfer of care protocol was followed      Last vitals:   Visit Vitals  /85   Pulse 81   Temp 36.4 °C (97.6 °F) (Axillary)   Resp (!) 23   Ht 5' 8" (1.727 m)   Wt 59.4 kg (130 lb 15.3 oz)   SpO2 100%   BMI 19.91 kg/m²     "

## 2017-12-14 NOTE — PLAN OF CARE
Pt vital signs wnl.  Pt with no pain and no nausea. lvad emergency bag and bedside monitor at the bedside.

## 2017-12-14 NOTE — ANESTHESIA PREPROCEDURE EVALUATION
12/14/2017  Suman Hayden is a 67 y.o., male.    Anesthesia Evaluation    I have reviewed the Patient Summary Reports.     I have reviewed the Medications.     Review of Systems  Anesthesia Hx:  History of prior surgery of interest to airway management or planning:  Denies Personal Hx of Anesthesia complications.   Social:  Non-Smoker, No Alcohol Use    Hematology/Oncology:  Hematology Normal   Oncology Normal     EENT/Dental:EENT/Dental Normal   Cardiovascular:   Exercise tolerance: poor Pacemaker (AICD, last shock 11/28, hematoma over site) Hypertension CHF (end-stage HF, EF ~10%, LVAD, mod dep RVF, pHTN ~40s) NYHA Classification IV    Pulmonary:  Pulmonary Normal    Renal/:  Renal/ Normal     Hepatic/GI:  Hepatic/GI Normal    Musculoskeletal:  Musculoskeletal Normal    Neurological:  Neurology Normal    Dermatological:  Skin Normal    Psych:  Psychiatric Normal           Physical Exam  General:  Well nourished, Large Beard    Airway/Jaw/Neck:  Airway Findings: Mouth Opening: Normal Tongue: Normal  General Airway Assessment: Adult, Average  Mallampati: III  TM Distance: Normal, at least 6 cm  Jaw/Neck Findings:     Eyes/Ears/Nose:  EYES/EARS/NOSE FINDINGS: Normal   Dental:  Dental Findings: In tact   Chest/Lungs:  Chest/Lungs Findings: (large hematoma over ICD site) Clear to auscultation, Normal Respiratory Rate     Heart/Vascular:  Heart Findings: Heart Murmur (VAD flow sounds)        Mental Status:  Mental Status Findings:  Cooperative, Alert and Oriented         Anesthesia Plan  Type of Anesthesia, risks & benefits discussed:  Anesthesia Type:  general  Patient's Preference:   Intra-op Monitoring Plan:   Intra-op Monitoring Plan Comments:   Post Op Pain Control Plan:   Post Op Pain Control Plan Comments:   Induction:   IV  Beta Blocker:  Patient is not currently on a Beta-Blocker (No further  documentation required).       Informed Consent: Patient understands risks and agrees with Anesthesia plan.  Questions answered. Anesthesia consent signed with patient.  ASA Score: 4     Day of Surgery Review of History & Physical:    H&P update referred to the provider.     Anesthesia Plan Notes:   67M end-stage HF ~10% s/p ICD/LVAD, pHTN, small intestine ulcers/GIB for device-assisted scope under GETA, pre-induction arterial monitoring        Ready For Surgery From Anesthesia Perspective.

## 2017-12-14 NOTE — NURSING TRANSFER
Nursing Transfer Note      12/14/2017     Transfer To: 350a    Transfer via stretcher    Transfer with cardiac monitoring, emergency bag and portable monitor    Transported by rn    Medicines sent: none    Chart send with patient: Yes    Notified: spouse told to return to the room    Patient reassessed at: 12/14/17

## 2017-12-14 NOTE — PROGRESS NOTES
[facility]  Heart Transplant  Progress Note    Patient Name: Suman Hayden  MRN: [id number]  Admission Date: 12/6/2017  Hospital Length of Stay: 8 days  Attending Physician: Graciela Bautista MD  Primary Care Provider: Joe Ernst MD  Principal Problem:Anemia    Subjective:     Interval History: No complaints this am. Ready to get his procedure done    Continuous Infusions:   heparin 54620 units/250 mL dextrose 5% mixture (NO NOMOGRAM) Stopped (12/14/17 1015)     Scheduled Meds:   amLODIPine  10 mg Oral Daily    furosemide  40 mg Oral BID    hydrALAZINE  50 mg Oral Q8H    magnesium oxide  400 mg Oral BID    pantoprazole  40 mg Intravenous BID    polyethylene glycol  17 g Oral Daily    potassium chloride  20 mEq Oral BID    pravastatin  20 mg Oral QHS     PRN Meds:sodium chloride, sodium chloride, ALPRAZolam, ondansetron, sodium chloride 0.9%, sodium chloride 0.9%    Review of patient's allergies indicates:  No Known Allergies  Objective:     Vital Signs (Most Recent):  Temp: 97.7 °F (36.5 °C) (12/14/17 1541)  Pulse: 64 (12/14/17 1600)  Resp: 14 (12/14/17 1600)  BP: (!) 82/60 (12/14/17 1600)  SpO2: 100 % (12/14/17 1600) Vital Signs (24h Range):  Temp:  [97.6 °F (36.4 °C)-98.7 °F (37.1 °C)] 97.7 °F (36.5 °C)  Pulse:  [53-90] 64  Resp:  [14-23] 14  SpO2:  [99 %-100 %] 100 %  BP: ()/(0-85) 82/60     Patient Vitals for the past 72 hrs (Last 3 readings):   Weight   12/14/17 0700 59.4 kg (130 lb 15.3 oz)   12/13/17 0700 60.4 kg (133 lb 2.5 oz)   12/12/17 0700 61.2 kg (134 lb 14.7 oz)     Body mass index is 19.91 kg/m².      Intake/Output Summary (Last 24 hours) at 12/14/17 1627  Last data filed at 12/14/17 1542   Gross per 24 hour   Intake              395 ml   Output             1950 ml   Net            -1555 ml       Hemodynamic Parameters:         Physical Exam   Constitutional: He is oriented to person, place, and time. No distress.   HENT:   Head: Normocephalic and atraumatic.    Eyes: No scleral icterus.   Neck: Normal range of motion.   Cardiovascular: Normal rate.    LVAD hum present   Pulmonary/Chest: Effort normal and breath sounds normal.   Abdominal: Soft. Bowel sounds are normal. He exhibits no distension and no mass. There is no tenderness. There is no rebound and no guarding. No hernia.   Musculoskeletal: Normal range of motion. He exhibits no edema.   Neurological: He is alert and oriented to person, place, and time.   Skin: He is not diaphoretic.       Significant Labs:  CBC:    Recent Labs  Lab 12/13/17  0537 12/13/17  1225 12/14/17  0605   WBC 6.18 9.64 6.49   RBC 2.87* 3.37* 3.03*   HGB 8.2* 9.4* 8.5*   HCT 25.9* 30.1* 27.3*    240 200   MCV 90 89 90   MCH 28.6 27.9 28.1   MCHC 31.7* 31.2* 31.1*     BNP:    Recent Labs  Lab 12/08/17  0542 12/11/17  0413 12/13/17  0537   * 552* 337*     CMP:    Recent Labs  Lab 12/08/17  0542  12/11/17  0413 12/12/17  0442 12/13/17  0537 12/14/17  0605   *  < > 80 80 76 68*   CALCIUM 8.9  < > 8.4* 8.5* 8.7 8.8   ALBUMIN 2.7*  --  2.7*  --  2.6*  --    PROT 6.0  --  5.5*  --  5.5*  --      < > 143 142 140 140   K 4.0  < > 3.4* 3.5 3.4* 4.0   CO2 20*  < > 29 28 29 28     < > 109 106 105 106   BUN 45*  < > 27* 20 15 16   CREATININE 1.3  < > 0.9 0.9 1.0 1.2   ALKPHOS 45*  --  44*  --  45*  --    ALT 6*  --  10  --  9*  --    AST 11  --  16  --  18  --    BILITOT 0.8  --  0.8  --  0.7  --    < > = values in this interval not displayed.   Coagulation:     Recent Labs  Lab 12/12/17  0442 12/13/17  0537 12/14/17  0605   INR 1.3* 1.2 1.1   APTT 41.4* 25.9 63.4*     LDH:    Recent Labs  Lab 12/12/17  0442 12/13/17  0537 12/14/17  0605    166 196     Microbiology:  Microbiology Results (last 7 days)     ** No results found for the last 168 hours. **          I have reviewed all pertinent labs within the past 24 hours.    Estimated Creatinine Clearance: 50.2 mL/min (based on SCr of 1.2 mg/dL).    Diagnostic  Results:  I have reviewed and interpreted all pertinent imaging results/findings within the past 24 hours.    Assessment and Plan:     No notes on file    LVAD (left ventricular assist device) present    -HeartMate 3 Implanted 7/27/17   -Speed set at 5200 rpm  - INR subtherapeutic. Holding coumadin due to GIB and for GI procedure today. On Heparin drip  -LDH stable.  Holding ASA  - DT, Not listed d/t Hep B          Normocytic anemia    -s/p total of 5 units PRBCs  -GI following. EGD 12/8/17 showed Normal esophagus; Gastritis and duodenitis; No gastrointestinal bleeding seen.    -VCE done 12/12/17 showed ulcers. Plan for Double Balloon today for Bxs of ulcer  -Hb down some today. Cont to monitor  -Continue with PPI and will hold off on iron as pt states he did not tolerate it.           Atrial tachycardia    Continue to monitor        Essential hypertension    -restarted home regimen- amlodipine and hydralazine; will monitor response            Susannah Elizabeth PA-C  Heart Transplant  [facility]

## 2017-12-14 NOTE — INTERVAL H&P NOTE
Pre-Procedure H and P Addendum    Patient seen and examined.  History and exam unchanged from prior history and physical.  Patient noted to have small bowel inflammation and ulcers during pill camera enteroscopy.      Procedure: Antegrade double-balloon enteroscopy.  Indication: Small intestinal ulcers with bleeding  ASA Class: per anesthesiology  Airway: normal  Neck Mobility: full range of motion  Mallampatti score: per anesthesia  History of anesthesia problems: no  Family history of anesthesia problems: no  Anesthesia Plan: MAC    Anesthesia/Surgery risks, benefits and alternative options discussed and understood by patient/family.          Active Hospital Problems    Diagnosis  POA    *Anemia [D64.9]  Yes    Normocytic anemia [D64.9]  Yes    Subtherapeutic international normalized ratio (INR) [R79.1]  Yes    LVAD (left ventricular assist device) present [Z95.811]  Not Applicable    Atrial tachycardia [I47.1]  Yes     Chronic    Chronic systolic congestive heart failure [I50.22]  Yes    Essential hypertension [I10]  Yes      Resolved Hospital Problems    Diagnosis Date Resolved POA   No resolved problems to display.

## 2017-12-14 NOTE — PROGRESS NOTES
Dr Chatterjee has not called back.  Savannah ([phone]) recalled and stated she will be calling Dr Chatterjee again for postop orders.

## 2017-12-14 NOTE — SUBJECTIVE & OBJECTIVE
Interval History: No complaints this am. Ready to get his procedure done    Continuous Infusions:   heparin 21924 units/250 mL dextrose 5% mixture (NO NOMOGRAM) Stopped (12/14/17 1015)     Scheduled Meds:   amLODIPine  10 mg Oral Daily    furosemide  40 mg Oral BID    hydrALAZINE  50 mg Oral Q8H    magnesium oxide  400 mg Oral BID    pantoprazole  40 mg Intravenous BID    polyethylene glycol  17 g Oral Daily    potassium chloride  20 mEq Oral BID    pravastatin  20 mg Oral QHS     PRN Meds:sodium chloride, sodium chloride, ALPRAZolam, ondansetron, sodium chloride 0.9%, sodium chloride 0.9%    Review of patient's allergies indicates:  No Known Allergies  Objective:     Vital Signs (Most Recent):  Temp: 97.7 °F (36.5 °C) (12/14/17 1541)  Pulse: 64 (12/14/17 1600)  Resp: 14 (12/14/17 1600)  BP: (!) 82/60 (12/14/17 1600)  SpO2: 100 % (12/14/17 1600) Vital Signs (24h Range):  Temp:  [97.6 °F (36.4 °C)-98.7 °F (37.1 °C)] 97.7 °F (36.5 °C)  Pulse:  [53-90] 64  Resp:  [14-23] 14  SpO2:  [99 %-100 %] 100 %  BP: ()/(0-85) 82/60     Patient Vitals for the past 72 hrs (Last 3 readings):   Weight   12/14/17 0700 59.4 kg (130 lb 15.3 oz)   12/13/17 0700 60.4 kg (133 lb 2.5 oz)   12/12/17 0700 61.2 kg (134 lb 14.7 oz)     Body mass index is 19.91 kg/m².      Intake/Output Summary (Last 24 hours) at 12/14/17 1627  Last data filed at 12/14/17 1542   Gross per 24 hour   Intake              395 ml   Output             1950 ml   Net            -1555 ml       Hemodynamic Parameters:         Physical Exam   Constitutional: He is oriented to person, place, and time. No distress.   HENT:   Head: Normocephalic and atraumatic.   Eyes: No scleral icterus.   Neck: Normal range of motion.   Cardiovascular: Normal rate.    LVAD hum present   Pulmonary/Chest: Effort normal and breath sounds normal.   Abdominal: Soft. Bowel sounds are normal. He exhibits no distension and no mass. There is no tenderness. There is no rebound and no  guarding. No hernia.   Musculoskeletal: Normal range of motion. He exhibits no edema.   Neurological: He is alert and oriented to person, place, and time.   Skin: He is not diaphoretic.       Significant Labs:  CBC:    Recent Labs  Lab 12/13/17  0537 12/13/17  1225 12/14/17  0605   WBC 6.18 9.64 6.49   RBC 2.87* 3.37* 3.03*   HGB 8.2* 9.4* 8.5*   HCT 25.9* 30.1* 27.3*    240 200   MCV 90 89 90   MCH 28.6 27.9 28.1   MCHC 31.7* 31.2* 31.1*     BNP:    Recent Labs  Lab 12/08/17  0542 12/11/17  0413 12/13/17  0537   * 552* 337*     CMP:    Recent Labs  Lab 12/08/17  0542  12/11/17  0413 12/12/17  0442 12/13/17  0537 12/14/17  0605   *  < > 80 80 76 68*   CALCIUM 8.9  < > 8.4* 8.5* 8.7 8.8   ALBUMIN 2.7*  --  2.7*  --  2.6*  --    PROT 6.0  --  5.5*  --  5.5*  --      < > 143 142 140 140   K 4.0  < > 3.4* 3.5 3.4* 4.0   CO2 20*  < > 29 28 29 28     < > 109 106 105 106   BUN 45*  < > 27* 20 15 16   CREATININE 1.3  < > 0.9 0.9 1.0 1.2   ALKPHOS 45*  --  44*  --  45*  --    ALT 6*  --  10  --  9*  --    AST 11  --  16  --  18  --    BILITOT 0.8  --  0.8  --  0.7  --    < > = values in this interval not displayed.   Coagulation:     Recent Labs  Lab 12/12/17 0442 12/13/17  0537 12/14/17  0605   INR 1.3* 1.2 1.1   APTT 41.4* 25.9 63.4*     LDH:    Recent Labs  Lab 12/12/17 0442 12/13/17  0537 12/14/17  0605    166 196     Microbiology:  Microbiology Results (last 7 days)     ** No results found for the last 168 hours. **          I have reviewed all pertinent labs within the past 24 hours.    Estimated Creatinine Clearance: 50.2 mL/min (based on SCr of 1.2 mg/dL).    Diagnostic Results:  I have reviewed and interpreted all pertinent imaging results/findings within the past 24 hours.

## 2017-12-14 NOTE — PROVATION PATIENT INSTRUCTIONS
Discharge Summary/Instructions after an Endoscopic Procedure  Patient Name: Suman Hayden  Patient MRN: [id number]  Patient YOB: 1950 Thursday, December 14, 2017  Andres Chatterjee MD  RESTRICTIONS:  During your procedure today, you received medications for sedation.  These   medications may affect your judgment, balance and coordination.  Therefore,   for 24 hours, you have the following restrictions:   - DO NOT drive a car, operate machinery, make legal/financial decisions,   sign important papers or drink alcohol.    ACTIVITY:  The following day: return to full activity including work, except no heavy   lifting, straining or running for 3 days if polyps were removed.  DIET:  Eat and drink normally unless instructed otherwise.     TREATMENT FOR COMMON SIDE EFFECTS:  - Mild abdominal pain, belching, bloating or excessive gas: rest, eat   lightly and use a heating pad.  - Sore Throat: treat with throat lozenges and/or gargle with warm salt   water.  SYMPTOMS TO WATCH FOR AND REPORT TO YOUR PHYSICIAN:  1. Abdominal pain or bloating, other than gas cramps.  2. Chest pain.  3. Back pain.  4. Chills or fever occurring within 24 hours after the procedure.  5. Rectal bleeding, which would show as bright red, maroon, or black stools.   (A tablespoon of blood from the rectum is not serious, especially if   hemorrhoids are present.)  6. Vomiting.  7. Weakness or dizziness.  8. Because air was used during the procedure, expelling large amounts of air   from your rectum or belching is normal.  9. If a bowel prep was taken, you may not have a bowel movement for 1-3   days.  This is normal.  GO DIRECTLY TO THE NEAREST EMERGENCY ROOM IF YOU HAVE ANY OF THE FOLLOWING:      Difficulty breathing  Chills and/or fever over 101 F   Persistent vomiting and/or vomiting blood   Severe abdominal pain   Severe chest pain   Black, tarry stools   Bleeding- more than one tablespoon   Any other symptom or condition that you may feel  needs urgent attention  Your doctor recommends these additional instructions:  If any biopsies were taken, your doctor s clinic will contact you in 1 to 2   weeks with any results.  Heparin will be given at your prior dose today.   We are waiting for your pathology results.  For questions, problems or results please call your physician - Andres Chatterjee MD at Work:  [phone].  [facility], EMERGENCY ROOM PHONE NUMBER: [phone]  IF A COMPLICATION OR EMERGENCY SITUATION ARISES AND YOU ARE UNABLE TO REACH   YOUR PHYSICIAN - GO DIRECTLY TO THE EMERGENCY ROOM.  Andres Chatterjee MD  12/14/2017 2:38:59 PM  This report has been verified and signed electronically.

## 2017-12-14 NOTE — ASSESSMENT & PLAN NOTE
-s/p total of 5 units PRBCs  -GI following. EGD 12/8/17 showed Normal esophagus; Gastritis and duodenitis; No gastrointestinal bleeding seen.    -VCE done 12/12/17 showed ulcers. Plan for Double Balloon today for Bxs of ulcer  -Hb down some today. Cont to monitor  -Continue with PPI and will hold off on iron as pt states he did not tolerate it.

## 2017-12-14 NOTE — ANESTHESIA PROCEDURE NOTES
Arterial    Diagnosis: CHF    Patient location during procedure: done in OR  Procedure start time: 12/14/2017 1:07 PM  Timeout: 12/14/2017 1:06 PM  Procedure end time: 12/14/2017 1:08 PM  Staffing  Anesthesiologist: EVELIN DAWKINS  Resident/CRNA: TONY RAZO  Performed: resident/CRNA   Anesthesiologist was present at the time of the procedure.  Preanesthetic Checklist  Completed: patient identified, site marked, surgical consent, pre-op evaluation, timeout performed, IV checked, risks and benefits discussed, monitors and equipment checked and anesthesia consent givenArterial  Skin Prep: chlorhexidine gluconate and isopropyl alcohol  Local Infiltration: lidocaine  Orientation: right  Location: radial  Catheter Size: 20 G  Catheter placement by Anatomical landmarks. Heme positive aspiration all ports.Insertion Attempts: 1  Assessment  Dressing: secured with tape and tegaderm  Patient: Tolerated well

## 2017-12-14 NOTE — PLAN OF CARE
Problem: Patient Care Overview  Goal: Plan of Care Review  Plan of care discussed with patient.  Fall precautions in place. Patient has complaints of pain to left chest wall. Relieved with warm packs. Discussed medications and care. Heparin gtt continued. Pt NPO at midnight.Patient has no questions at this time.White board updated. Bed locked in lowest position. Side rails up x2. Will continue to monitor.

## 2017-12-15 NOTE — ASSESSMENT & PLAN NOTE
-s/p total of 5 units PRBCs  -GI following. EGD 12/8/17 showed Normal esophagus; Gastritis and duodenitis; No gastrointestinal bleeding seen.    -VCE done 12/12/17 showed ulcers.   -Enteroscopy done yesterday- showed single (solitary) ulcer in the proximal ileum which was biopsied. Stricture in the proximal ileum. Presence of ileal foreign body. Removal was successful. Will discuss plan with GI  -Hb down some today. Cont to trend  -Continue with PPI and will hold off on iron as pt states he did not tolerate it. Also tried IV Iron but pt did not tolerate

## 2017-12-15 NOTE — NURSING
Called [place], [facility], [phone], to report that floor is slanted and when patient takes a shower the room floor gets wet.

## 2017-12-15 NOTE — NURSING
Called Renato, [employer], @ [phone] to inquire about walker with wheels that has been requested for 3 days.  He stated Rhett would bring a walker to patient's room today.

## 2017-12-15 NOTE — NURSING
MAP = 65.  Doppler = 70/0.  NORA Velazquez notified.  Ordered to hold 1400 hour dose hydralazine 50 mg oral q8H.  Spouse expressed concern with left upper chest wall hematoma.  Site is clean, dry.  NORA Velazquez stated EP ordered to remove dressing and to keep site open to air.  Removed dressing.  Scab noted.  No drainage noted.

## 2017-12-15 NOTE — PROGRESS NOTES
[facility]  Heart Transplant  Progress Note    Patient Name: Suman Hayden  MRN: [id number]  Admission Date: 12/6/2017  Hospital Length of Stay: 9 days  Attending Physician: Graciela Bautista MD  Primary Care Provider: Joe Ernst MD  Principal Problem:Anemia    Subjective:     Interval History: Pt is very frustrated this am. He is tired of being in the hospital and wants to just eat some regular food. Emotional when talking with me this morning    Continuous Infusions:   heparin 96812 units/250 mL dextrose 5% mixture (NO NOMOGRAM) 16 Units/kg/hr (12/14/17 2209)     Scheduled Meds:   amLODIPine  10 mg Oral Daily    furosemide  40 mg Oral BID    hydrALAZINE  50 mg Oral Q8H    magnesium oxide  400 mg Oral BID    pantoprazole  40 mg Oral BID AC    polyethylene glycol  17 g Oral Daily    potassium chloride  20 mEq Oral BID    pravastatin  20 mg Oral QHS    [START ON 12/16/2017] warfarin  2 mg Oral Every Mon, Wed, Fri, Sun    [START ON 12/16/2017] warfarin  3 mg Oral Every Tues, Thurs, Sat    warfarin  5 mg Oral Once     PRN Meds:sodium chloride, sodium chloride, ALPRAZolam, ondansetron, sodium chloride 0.9%, sodium chloride 0.9%    Review of patient's allergies indicates:  No Known Allergies  Objective:     Vital Signs (Most Recent):  Temp: 99 °F (37.2 °C) (12/15/17 1435)  Pulse: 79 (12/15/17 1500)  Resp: 18 (12/15/17 1435)  BP: (!) 70/0 (12/15/17 1437)  SpO2: 99 % (12/15/17 1435) Vital Signs (24h Range):  Temp:  [97.7 °F (36.5 °C)-99 °F (37.2 °C)] 99 °F (37.2 °C)  Pulse:  [56-90] 79  Resp:  [14-22] 18  SpO2:  [97 %-100 %] 99 %  BP: (62-97)/(0-77) 70/0     Patient Vitals for the past 72 hrs (Last 3 readings):   Weight   12/15/17 0700 58.6 kg (129 lb 3 oz)   12/14/17 0700 59.4 kg (130 lb 15.3 oz)   12/13/17 0700 60.4 kg (133 lb 2.5 oz)     Body mass index is 19.64 kg/m².      Intake/Output Summary (Last 24 hours) at 12/15/17 1529  Last data filed at 12/15/17 1400   Gross per 24 hour    Intake              915 ml   Output             2160 ml   Net            -1245 ml       Hemodynamic Parameters:         Physical Exam   Constitutional: He is oriented to person, place, and time. No distress.   HENT:   Head: Normocephalic and atraumatic.   Eyes: No scleral icterus.   Neck: Normal range of motion.   Cardiovascular: Normal rate.    LVAD hum present   Pulmonary/Chest: Effort normal and breath sounds normal.   Abdominal: Soft. Bowel sounds are normal. He exhibits no distension and no mass. There is no tenderness. There is no rebound and no guarding. No hernia.   Musculoskeletal: Normal range of motion. He exhibits no edema.   Neurological: He is alert and oriented to person, place, and time.   Skin: He is not diaphoretic.       Significant Labs:  CBC:    Recent Labs  Lab 12/14/17  0605 12/15/17  0450 12/15/17  1135   WBC 6.49 6.37 6.46   RBC 3.03* 2.89* 3.11*   HGB 8.5* 7.9* 8.5*   HCT 27.3* 25.4* 27.4*    202 218   MCV 90 88 88   MCH 28.1 27.3 27.3   MCHC 31.1* 31.1* 31.0*     BNP:    Recent Labs  Lab 12/11/17  0413 12/13/17  0537 12/15/17  0450   * 337* 386*     CMP:    Recent Labs  Lab 12/11/17 0413  12/13/17  0537 12/14/17  0605 12/15/17  0450   GLU 80  < > 76 68* 72   CALCIUM 8.4*  < > 8.7 8.8 8.7   ALBUMIN 2.7*  --  2.6*  --  2.7*   PROT 5.5*  --  5.5*  --  5.7*     < > 140 140 142   K 3.4*  < > 3.4* 4.0 3.6   CO2 29  < > 29 28 26     < > 105 106 108   BUN 27*  < > 15 16 16   CREATININE 0.9  < > 1.0 1.2 1.2   ALKPHOS 44*  --  45*  --  48*   ALT 10  --  9*  --  7*   AST 16  --  18  --  15   BILITOT 0.8  --  0.7  --  0.7   < > = values in this interval not displayed.   Coagulation:     Recent Labs  Lab 12/13/17  0537 12/14/17  0605 12/15/17  0450   INR 1.2 1.1 1.1   APTT 25.9 63.4* 57.1*     LDH:    Recent Labs  Lab 12/13/17  0537 12/14/17  0605 12/15/17  0450    196 201     Microbiology:  Microbiology Results (last 7 days)     ** No results found for the last 168  hours. **          I have reviewed all pertinent labs within the past 24 hours.    Estimated Creatinine Clearance: 49.5 mL/min (based on SCr of 1.2 mg/dL).    Diagnostic Results:  I have reviewed and interpreted all pertinent imaging results/findings within the past 24 hours.    Assessment and Plan:     No notes on file    LVAD (left ventricular assist device) present    -HeartMate 3 Implanted 7/27/17   -Speed set at 5200 rpm  - INR subtherapeutic. Restarted coumadin yesterday (was held for GIB). Cont Heparin drip  -LDH stable.  Holding ASA in setting of GIB and ulcer found on VCE  - DT, Not listed d/t Hep B          Normocytic anemia    -s/p total of 5 units PRBCs  -GI following. EGD 12/8/17 showed Normal esophagus; Gastritis and duodenitis; No gastrointestinal bleeding seen.    -VCE done 12/12/17 showed ulcers.   -Enteroscopy done yesterday- showed single (solitary) ulcer in the proximal ileum which was biopsied. Stricture in the proximal ileum. Presence of ileal foreign body. Removal was successful. Will discuss plan with GI  -Hb down some today. Cont to trend  -Continue with PPI and will hold off on iron as pt states he did not tolerate it. Also tried IV Iron but pt did not tolerate          Atrial tachycardia    Continue to monitor        Essential hypertension    -restarted home regimen- amlodipine and hydralazine; will monitor response            Susannah Elizabeth PA-C  Heart Transplant  [facility]

## 2017-12-15 NOTE — ANESTHESIA POSTPROCEDURE EVALUATION
"Anesthesia Post Evaluation    Patient: Suman Hayden    Procedure(s) Performed: Procedure(s) (LRB):  DEVICE ASSISTED ENTEROSCOPY-ANTEROGRADE (N/A)    Final Anesthesia Type: general  Patient location during evaluation: med/surg floor  Patient participation: Yes- Able to Participate  Level of consciousness: awake and alert  Pain management: adequate  Airway patency: patent  PONV status at discharge: No PONV  Anesthetic complications: no      Cardiovascular status: blood pressure returned to baseline  Respiratory status: unassisted, spontaneous ventilation and room air  Hydration status: euvolemic  Follow-up not needed.        Visit Vitals  BP (!) 76/0 (BP Location: Left arm, Patient Position: Sitting)   Pulse 84   Temp 36.7 °C (98.1 °F) (Oral)   Resp 18   Ht 5' 8" (1.727 m)   Wt 58.6 kg (129 lb 3 oz)   SpO2 99%   BMI 19.64 kg/m²       Pain/Gurpreet Score: Pain Assessment Performed: Yes (12/15/2017  6:00 AM)  Presence of Pain: denies (12/15/2017  6:00 AM)  Pain Rating Prior to Med Admin: 0 (12/14/2017  6:00 PM)  Gurpreet Score: 10 (12/14/2017  3:45 PM)      "

## 2017-12-15 NOTE — ASSESSMENT & PLAN NOTE
-HeartMate 3 Implanted 7/27/17   -Speed set at 5200 rpm  - INR subtherapeutic. Restarted coumadin yesterday (was held for GIB). Cont Heparin drip  -LDH stable.  Holding ASA in setting of GIB and ulcer found on VCE  - DT, Not listed d/t Hep B

## 2017-12-15 NOTE — NURSING
Patient and spouse arguing about ordered fluid restriction.  Explained to patient that he may drink 7.4 cups of fluid.  Plastic measuring cup provided.  Patient did not receive dinner meal lat night.  Both staff and spouse called to order meal.  Called [employer] @ [phone] to ensure breakfast is ordered and delivered.

## 2017-12-15 NOTE — ADDENDUM NOTE
Addendum  created 12/15/17 0959 by Anne Alex MD    Anesthesia Intra Blocks edited, Sign clinical note

## 2017-12-15 NOTE — SUBJECTIVE & OBJECTIVE
Interval History: Pt is very frustrated this am. He is tired of being in the hospital and wants to just eat some regular food. Emotional when talking with me this morning    Continuous Infusions:   heparin 91563 units/250 mL dextrose 5% mixture (NO NOMOGRAM) 16 Units/kg/hr (12/14/17 2209)     Scheduled Meds:   amLODIPine  10 mg Oral Daily    furosemide  40 mg Oral BID    hydrALAZINE  50 mg Oral Q8H    magnesium oxide  400 mg Oral BID    pantoprazole  40 mg Oral BID AC    polyethylene glycol  17 g Oral Daily    potassium chloride  20 mEq Oral BID    pravastatin  20 mg Oral QHS    [START ON 12/16/2017] warfarin  2 mg Oral Every Mon, Wed, Fri, Sun    [START ON 12/16/2017] warfarin  3 mg Oral Every Tues, Thurs, Sat    warfarin  5 mg Oral Once     PRN Meds:sodium chloride, sodium chloride, ALPRAZolam, ondansetron, sodium chloride 0.9%, sodium chloride 0.9%    Review of patient's allergies indicates:  No Known Allergies  Objective:     Vital Signs (Most Recent):  Temp: 99 °F (37.2 °C) (12/15/17 1435)  Pulse: 79 (12/15/17 1500)  Resp: 18 (12/15/17 1435)  BP: (!) 70/0 (12/15/17 1437)  SpO2: 99 % (12/15/17 1435) Vital Signs (24h Range):  Temp:  [97.7 °F (36.5 °C)-99 °F (37.2 °C)] 99 °F (37.2 °C)  Pulse:  [56-90] 79  Resp:  [14-22] 18  SpO2:  [97 %-100 %] 99 %  BP: (62-97)/(0-77) 70/0     Patient Vitals for the past 72 hrs (Last 3 readings):   Weight   12/15/17 0700 58.6 kg (129 lb 3 oz)   12/14/17 0700 59.4 kg (130 lb 15.3 oz)   12/13/17 0700 60.4 kg (133 lb 2.5 oz)     Body mass index is 19.64 kg/m².      Intake/Output Summary (Last 24 hours) at 12/15/17 1529  Last data filed at 12/15/17 1400   Gross per 24 hour   Intake              915 ml   Output             2160 ml   Net            -1245 ml       Hemodynamic Parameters:         Physical Exam   Constitutional: He is oriented to person, place, and time. No distress.   HENT:   Head: Normocephalic and atraumatic.   Eyes: No scleral icterus.   Neck: Normal range  of motion.   Cardiovascular: Normal rate.    LVAD hum present   Pulmonary/Chest: Effort normal and breath sounds normal.   Abdominal: Soft. Bowel sounds are normal. He exhibits no distension and no mass. There is no tenderness. There is no rebound and no guarding. No hernia.   Musculoskeletal: Normal range of motion. He exhibits no edema.   Neurological: He is alert and oriented to person, place, and time.   Skin: He is not diaphoretic.       Significant Labs:  CBC:    Recent Labs  Lab 12/14/17  0605 12/15/17  0450 12/15/17  1135   WBC 6.49 6.37 6.46   RBC 3.03* 2.89* 3.11*   HGB 8.5* 7.9* 8.5*   HCT 27.3* 25.4* 27.4*    202 218   MCV 90 88 88   MCH 28.1 27.3 27.3   MCHC 31.1* 31.1* 31.0*     BNP:    Recent Labs  Lab 12/11/17  0413 12/13/17  0537 12/15/17  0450   * 337* 386*     CMP:    Recent Labs  Lab 12/11/17  0413  12/13/17  0537 12/14/17  0605 12/15/17  0450   GLU 80  < > 76 68* 72   CALCIUM 8.4*  < > 8.7 8.8 8.7   ALBUMIN 2.7*  --  2.6*  --  2.7*   PROT 5.5*  --  5.5*  --  5.7*     < > 140 140 142   K 3.4*  < > 3.4* 4.0 3.6   CO2 29  < > 29 28 26     < > 105 106 108   BUN 27*  < > 15 16 16   CREATININE 0.9  < > 1.0 1.2 1.2   ALKPHOS 44*  --  45*  --  48*   ALT 10  --  9*  --  7*   AST 16  --  18  --  15   BILITOT 0.8  --  0.7  --  0.7   < > = values in this interval not displayed.   Coagulation:     Recent Labs  Lab 12/13/17  0537 12/14/17  0605 12/15/17  0450   INR 1.2 1.1 1.1   APTT 25.9 63.4* 57.1*     LDH:    Recent Labs  Lab 12/13/17 0537 12/14/17  0605 12/15/17  0450    196 201     Microbiology:  Microbiology Results (last 7 days)     ** No results found for the last 168 hours. **          I have reviewed all pertinent labs within the past 24 hours.    Estimated Creatinine Clearance: 49.5 mL/min (based on SCr of 1.2 mg/dL).    Diagnostic Results:  I have reviewed and interpreted all pertinent imaging results/findings within the past 24 hours.

## 2017-12-15 NOTE — NURSING
Per Sharlene, CT, [phone], patient will need to remain NPO 4 hours prior to scan.  Patient will consume Volumen Contrast.  Patient will drink 1 bottle wait 20 minutes, consume the 2nd bottle, wait 20 minutes, and consume 1/2 a bottle and bring to CT.  Called pharmacy for assistance to secure medication, who [facility] to [facility].  [facility] does not stock medication.  Called CT again.  Per Merlin, they have the medication on the 2nd floor.  He will deliver the medication.  Patient will need 18 gauge or midline needs to be power injectable (PSI).  Midline reads PSI.  Confirmed with CT they said it was okay to use.  Patient will go to 2nd floor for CT.

## 2017-12-15 NOTE — PLAN OF CARE
Problem: Patient Care Overview  Goal: Plan of Care Review  Plan of care discussed with patient.  Patient ambulating independently, fall precautions in place. Patient has no complaints of pain. Discussed medications and care.VAD sounds present. VAD numbers and dopplers WNL. Heparin gtt continued.  Patient has no questions at this time.White board updated. Bed locked in lowest position. Side rails up x2. Will continue to monitor.

## 2017-12-16 NOTE — HPI
68 y/o male with a PMHx of NICM (EF 10%) s/p HeartMate 3 Implanted 7/27/2017 as DT, HTN, DLD and recent ICD revision on 11/20/17 with Dr. Vidal (DC ICD placed with active RA/LV leads. RV lead capped during revision) that was complicated by pocket hematoma. He was discharged on 11/22/17 with plan to follow up in the [place] [facility] clinic in a week but had not had follow up yet given the recent thanksgiving holiday, admitted as a transfer after presenting to [facility] with ICD shocks.  His ICD was reprogrammed in [facility] as the shocks for VT were inappropriate , rhythm was in fact Atrial Tachycardia. His INR was subtherapeutic and he was bridged with heparin. His hematoma was noted to be improving.   He now presents to [facility] clinic with Hg 5.7 with suspected bleeding.

## 2017-12-16 NOTE — ASSESSMENT & PLAN NOTE
-HeartMate 3 Implanted 7/27/17   -Speed set at 5200 rpm  -INR subtherapeutic. Restarted coumadin. Cont Heparin drip  -LDH stable. Holding ASA in setting of GIB and ulcer found on VCE  - DT, Not listed d/t Hep B

## 2017-12-16 NOTE — PROGRESS NOTES
[facility]  Heart Transplant  Progress Note    Patient Name: Suman Hayden  MRN: [id number]  Admission Date: 12/6/2017  Hospital Length of Stay: 10 days  Attending Physician: Graciela Bautista MD  Primary Care Provider: Joe Ernst MD  Principal Problem:Anemia    Subjective:     Interval History: no acute events overnight, no distress    Continuous Infusions:   heparin 35875 units/250 mL dextrose 5% mixture (NO NOMOGRAM) 16 Units/kg/hr (12/15/17 2141)     Scheduled Meds:   amLODIPine  10 mg Oral Daily    furosemide  40 mg Oral BID    hydrALAZINE  50 mg Oral Q8H    magnesium oxide  400 mg Oral BID    pantoprazole  40 mg Oral BID AC    polyethylene glycol  17 g Oral Daily    potassium chloride  20 mEq Oral BID    pravastatin  20 mg Oral QHS    warfarin  2 mg Oral Every Mon, Wed, Fri, Sun    warfarin  3 mg Oral Every Tues, Thurs, Sat     PRN Meds:sodium chloride, sodium chloride, ALPRAZolam, ondansetron, sodium chloride 0.9%, sodium chloride 0.9%    Review of patient's allergies indicates:  No Known Allergies  Objective:     Vital Signs (Most Recent):  Temp: 98.5 °F (36.9 °C) (12/16/17 0405)  Pulse: 71 (12/16/17 0700)  Resp: 18 (12/16/17 0405)  BP: (!) 92/54 (12/16/17 0405)  SpO2: 98 % (12/16/17 0405) Vital Signs (24h Range):  Temp:  [98 °F (36.7 °C)-99 °F (37.2 °C)] 98.5 °F (36.9 °C)  Pulse:  [56-80] 71  Resp:  [18] 18  SpO2:  [96 %-100 %] 98 %  BP: (70-92)/(0-63) 92/54     Patient Vitals for the past 72 hrs (Last 3 readings):   Weight   12/15/17 0700 58.6 kg (129 lb 3 oz)   12/14/17 0700 59.4 kg (130 lb 15.3 oz)     Body mass index is 19.64 kg/m².      Intake/Output Summary (Last 24 hours) at 12/16/17 0745  Last data filed at 12/16/17 0500   Gross per 24 hour   Intake              840 ml   Output             1650 ml   Net             -810 ml       Hemodynamic Parameters:       Telemetry: no sig events    Physical Exam   Constitutional: He is oriented to person, place, and time. No  distress.   HENT:   Head: Normocephalic and atraumatic.   Eyes: No scleral icterus.   Neck: Normal range of motion.   Cardiovascular: Normal rate.    LVAD hum present   Pulmonary/Chest: Effort normal and breath sounds normal.   Abdominal: Soft. Bowel sounds are normal. He exhibits no distension and no mass. There is no tenderness. There is no rebound and no guarding. No hernia.   Musculoskeletal: Normal range of motion. He exhibits no edema.   Neurological: He is alert and oriented to person, place, and time.   Skin: He is not diaphoretic.       Significant Labs:  CBC:    Recent Labs  Lab 12/15/17  0450 12/15/17  1135 12/16/17  0447   WBC 6.37 6.46 6.10   RBC 2.89* 3.11* 2.83*   HGB 7.9* 8.5* 7.7*   HCT 25.4* 27.4* 25.1*    218 208   MCV 88 88 89   MCH 27.3 27.3 27.2   MCHC 31.1* 31.0* 30.7*     BNP:    Recent Labs  Lab 12/11/17  0413 12/13/17  0537 12/15/17  0450   * 337* 386*     CMP:    Recent Labs  Lab 12/11/17  0413  12/13/17  0537 12/14/17  0605 12/15/17  0450 12/16/17  0447   GLU 80  < > 76 68* 72 107   CALCIUM 8.4*  < > 8.7 8.8 8.7 8.7   ALBUMIN 2.7*  --  2.6*  --  2.7*  --    PROT 5.5*  --  5.5*  --  5.7*  --      < > 140 140 142 137   K 3.4*  < > 3.4* 4.0 3.6 4.2   CO2 29  < > 29 28 26 27     < > 105 106 108 103   BUN 27*  < > 15 16 16 14   CREATININE 0.9  < > 1.0 1.2 1.2 1.1   ALKPHOS 44*  --  45*  --  48*  --    ALT 10  --  9*  --  7*  --    AST 16  --  18  --  15  --    BILITOT 0.8  --  0.7  --  0.7  --    < > = values in this interval not displayed.   Coagulation:     Recent Labs  Lab 12/14/17  0605 12/15/17  0450 12/16/17  0447   INR 1.1 1.1 1.3*   APTT 63.4* 57.1* 66.1*     LDH:    Recent Labs  Lab 12/14/17  0605 12/15/17  0450 12/16/17  0447    201 179     Microbiology:  Microbiology Results (last 7 days)     ** No results found for the last 168 hours. **          I have reviewed all pertinent labs within the past 24 hours.    Estimated Creatinine Clearance: 54  mL/min (based on SCr of 1.1 mg/dL).    Diagnostic Results:  I have reviewed all pertinent imaging results/findings within the past 24 hours.    Assessment and Plan:     66 y/o male with a PMHx of NICM (EF 10%) s/p HeartMate 3 Implanted 7/27/2017 as DT, HTN, DLD and recent ICD revision on 11/20/17 with Dr. Vidal (DC ICD placed with active RA/LV leads. RV lead capped during revision) that was complicated by pocket hematoma. He was discharged on 11/22/17 with plan to follow up in the [place] EP clinic in a week but had not had follow up yet given the recent thanksgiving holiday, admitted as a transfer after presenting to [place] with ICD shocks.  His ICD was reprogrammed in [facility] as the shocks for VT were inappropriate , rhythm was in fact Atrial Tachycardia. His INR was subtherapeutic and he was bridged with heparin. His hematoma was noted to be improving. He now presents with Hg 5.7 with suspected bleeding, found to have ileal ulcer currently awaiting path results an concomitant GI recs.    Normocytic anemia    -s/p total of 5 units PRBCs  -GI following. EGD 12/8/17 showed Normal esophagus; Gastritis and duodenitis; No gastrointestinal bleeding seen.    -VCE done 12/12/17 showed ulcers.   -Enteroscopy done- showed single (solitary) ulcer in the proximal ileum which was biopsied. Stricture in the proximal ileum. Presence of ileal foreign body. Removal was successful. Will discuss plan with GI  -Hb down some today. Cont to trend  -Continue with PPI and will hold off on iron as pt states he did not tolerate it. Also tried IV Iron but pt did not tolerate          LVAD (left ventricular assist device) present    -HeartMate 3 Implanted 7/27/17   -Speed set at 5200 rpm  -INR subtherapeutic. Restarted coumadin. Cont Heparin drip  -LDH stable. Holding ASA in setting of GIB and ulcer found on VCE  - DT, Not listed d/t Hep B          Atrial tachycardia    Continue to monitor        Essential hypertension     -restarted home regimen- amlodipine and hydralazine; will monitor response            Casey Newman MD  Heart Transplant  [facility]

## 2017-12-16 NOTE — SUBJECTIVE & OBJECTIVE
Interval History: no acute events overnight, no distress    Continuous Infusions:   heparin 13371 units/250 mL dextrose 5% mixture (NO NOMOGRAM) 16 Units/kg/hr (12/15/17 2141)     Scheduled Meds:   amLODIPine  10 mg Oral Daily    furosemide  40 mg Oral BID    hydrALAZINE  50 mg Oral Q8H    magnesium oxide  400 mg Oral BID    pantoprazole  40 mg Oral BID AC    polyethylene glycol  17 g Oral Daily    potassium chloride  20 mEq Oral BID    pravastatin  20 mg Oral QHS    warfarin  2 mg Oral Every Mon, Wed, Fri, Sun    warfarin  3 mg Oral Every Tues, Thurs, Sat     PRN Meds:sodium chloride, sodium chloride, ALPRAZolam, ondansetron, sodium chloride 0.9%, sodium chloride 0.9%    Review of patient's allergies indicates:  No Known Allergies  Objective:     Vital Signs (Most Recent):  Temp: 98.5 °F (36.9 °C) (12/16/17 0405)  Pulse: 71 (12/16/17 0700)  Resp: 18 (12/16/17 0405)  BP: (!) 92/54 (12/16/17 0405)  SpO2: 98 % (12/16/17 0405) Vital Signs (24h Range):  Temp:  [98 °F (36.7 °C)-99 °F (37.2 °C)] 98.5 °F (36.9 °C)  Pulse:  [56-80] 71  Resp:  [18] 18  SpO2:  [96 %-100 %] 98 %  BP: (70-92)/(0-63) 92/54     Patient Vitals for the past 72 hrs (Last 3 readings):   Weight   12/15/17 0700 58.6 kg (129 lb 3 oz)   12/14/17 0700 59.4 kg (130 lb 15.3 oz)     Body mass index is 19.64 kg/m².      Intake/Output Summary (Last 24 hours) at 12/16/17 0745  Last data filed at 12/16/17 0500   Gross per 24 hour   Intake              840 ml   Output             1650 ml   Net             -810 ml       Hemodynamic Parameters:       Telemetry: no sig events    Physical Exam   Constitutional: He is oriented to person, place, and time. No distress.   HENT:   Head: Normocephalic and atraumatic.   Eyes: No scleral icterus.   Neck: Normal range of motion.   Cardiovascular: Normal rate.    LVAD hum present   Pulmonary/Chest: Effort normal and breath sounds normal.   Abdominal: Soft. Bowel sounds are normal. He exhibits no distension and no  mass. There is no tenderness. There is no rebound and no guarding. No hernia.   Musculoskeletal: Normal range of motion. He exhibits no edema.   Neurological: He is alert and oriented to person, place, and time.   Skin: He is not diaphoretic.       Significant Labs:  CBC:    Recent Labs  Lab 12/15/17  0450 12/15/17  1135 12/16/17 0447   WBC 6.37 6.46 6.10   RBC 2.89* 3.11* 2.83*   HGB 7.9* 8.5* 7.7*   HCT 25.4* 27.4* 25.1*    218 208   MCV 88 88 89   MCH 27.3 27.3 27.2   MCHC 31.1* 31.0* 30.7*     BNP:    Recent Labs  Lab 12/11/17 0413 12/13/17  0537 12/15/17  0450   * 337* 386*     CMP:    Recent Labs  Lab 12/11/17 0413 12/13/17  0537 12/14/17  0605 12/15/17  0450 12/16/17  0447   GLU 80  < > 76 68* 72 107   CALCIUM 8.4*  < > 8.7 8.8 8.7 8.7   ALBUMIN 2.7*  --  2.6*  --  2.7*  --    PROT 5.5*  --  5.5*  --  5.7*  --      < > 140 140 142 137   K 3.4*  < > 3.4* 4.0 3.6 4.2   CO2 29  < > 29 28 26 27     < > 105 106 108 103   BUN 27*  < > 15 16 16 14   CREATININE 0.9  < > 1.0 1.2 1.2 1.1   ALKPHOS 44*  --  45*  --  48*  --    ALT 10  --  9*  --  7*  --    AST 16  --  18  --  15  --    BILITOT 0.8  --  0.7  --  0.7  --    < > = values in this interval not displayed.   Coagulation:     Recent Labs  Lab 12/14/17  0605 12/15/17  0450 12/16/17  0447   INR 1.1 1.1 1.3*   APTT 63.4* 57.1* 66.1*     LDH:    Recent Labs  Lab 12/14/17  0605 12/15/17  0450 12/16/17  0447    201 179     Microbiology:  Microbiology Results (last 7 days)     ** No results found for the last 168 hours. **          I have reviewed all pertinent labs within the past 24 hours.    Estimated Creatinine Clearance: 54 mL/min (based on SCr of 1.1 mg/dL).    Diagnostic Results:  I have reviewed all pertinent imaging results/findings within the past 24 hours.

## 2017-12-16 NOTE — NURSING
Called CT, [phone].  They will deliver barium as soon as they are able.  Midline needs to read PSI 5 ml/second or an 18 gauge needs to be placed.  Attempted to place 18 gauge x 4.

## 2017-12-16 NOTE — ASSESSMENT & PLAN NOTE
-s/p total of 5 units PRBCs  -GI following. EGD 12/8/17 showed Normal esophagus; Gastritis and duodenitis; No gastrointestinal bleeding seen.    -VCE done 12/12/17 showed ulcers.   -Enteroscopy done- showed single (solitary) ulcer in the proximal ileum which was biopsied. Stricture in the proximal ileum. Presence of ileal foreign body. Removal was successful. Will discuss plan with GI  -Hb down some today. Cont to trend  -Continue with PPI and will hold off on iron as pt states he did not tolerate it. Also tried IV Iron but pt did not tolerate

## 2017-12-16 NOTE — PLAN OF CARE
Problem: Patient Care Overview  Goal: Plan of Care Review  Plan of care discussed with patient. all precautions in place. Patient has no complaints of pain. Discussed medications and care. Heparin gtt continued. VAD sounds present. VAD numbers and dopplers WNL. Patient has no questions at this time.White board updated. Bed locked in lowest position. Side rails up x2. Will continue to monitor.

## 2017-12-16 NOTE — PLAN OF CARE
Problem: Patient Care Overview  Goal: Plan of Care Review  Outcome: Ongoing (interventions implemented as appropriate)  No significant events during shift, no falls/injury. Pt denies chest pain or SOB. Spouse remains at bedside for support. Pt encouraged to call nurse for assistance.

## 2017-12-17 NOTE — ASSESSMENT & PLAN NOTE
-s/p total of 5 units PRBCs  -GI following. EGD 12/8/17 showed Normal esophagus; Gastritis and duodenitis; No gastrointestinal bleeding seen.    -VCE done 12/12/17 showed ulcers.   -Enteroscopy done- showed single (solitary) ulcer in the proximal ileum which was biopsied. Stricture in the proximal ileum. Presence of ileal foreign body. Removal was successful. Will discuss plan with GI  -CT enterography done with no new findings  -no further gi w/u  -Hg stable  -Continue with PPI and will hold off on iron as pt states he did not tolerate it. Also tried IV Iron but pt did not tolerate

## 2017-12-17 NOTE — PROGRESS NOTES
[facility]  Heart Transplant  Progress Note    Patient Name: Suman Hayden  MRN: [id number]  Admission Date: 12/6/2017  Hospital Length of Stay: 11 days  Attending Physician: Graciela Bautista MD  Primary Care Provider: Joe Ernst MD  Principal Problem:LVAD (left ventricular assist device) present    Subjective:     Interval History: NAEON  Still subtherapeutic but with increase in INR with addition of 1 mg coumadin to standing dose    Continuous Infusions:   heparin 77706 units/250 mL dextrose 5% mixture (NO NOMOGRAM) 16 Units/kg/hr (12/16/17 2150)     Scheduled Meds:   amLODIPine  10 mg Oral Daily    furosemide  40 mg Oral BID    hydrALAZINE  50 mg Oral Q8H    magnesium oxide  400 mg Oral BID    pantoprazole  40 mg Oral BID AC    polyethylene glycol  17 g Oral Daily    potassium chloride  20 mEq Oral BID    pravastatin  20 mg Oral QHS    warfarin  1 mg Oral Once    warfarin  2 mg Oral Every Mon, Wed, Fri, Sun    warfarin  3 mg Oral Every Tues, Thurs, Sat     PRN Meds:sodium chloride, sodium chloride, ALPRAZolam, ondansetron, sodium chloride 0.9%, sodium chloride 0.9%    Review of patient's allergies indicates:  No Known Allergies  Objective:     Vital Signs (Most Recent):  Temp: 98.5 °F (36.9 °C) (12/17/17 0550)  Pulse: 73 (12/17/17 0700)  Resp: 18 (12/17/17 0550)  BP: (!) 87/50 (12/17/17 0550)  SpO2: 99 % (12/17/17 0550) Vital Signs (24h Range):  Temp:  [98.1 °F (36.7 °C)-98.6 °F (37 °C)] 98.5 °F (36.9 °C)  Pulse:  [64-88] 73  Resp:  [18] 18  SpO2:  [96 %-99 %] 99 %  BP: (70-95)/(0-65) 87/50     Patient Vitals for the past 72 hrs (Last 3 readings):   Weight   12/16/17 0700 60.6 kg (133 lb 9.6 oz)   12/15/17 0700 58.6 kg (129 lb 3 oz)     Body mass index is 20.31 kg/m².      Intake/Output Summary (Last 24 hours) at 12/17/17 1006  Last data filed at 12/17/17 0500   Gross per 24 hour   Intake            706.7 ml   Output             2400 ml   Net          -1693.3 ml        Hemodynamic Parameters:       Telemetry: no events    Physical Exam   Constitutional: He is oriented to person, place, and time. No distress.   HENT:   Head: Normocephalic and atraumatic.   Eyes: No scleral icterus.   Neck: Normal range of motion.   Cardiovascular: Normal rate.    LVAD hum present   Pulmonary/Chest: Effort normal and breath sounds normal.   Abdominal: Soft. Bowel sounds are normal. He exhibits no distension and no mass. There is no tenderness. There is no rebound and no guarding. No hernia.   Musculoskeletal: Normal range of motion. He exhibits no edema.   Neurological: He is alert and oriented to person, place, and time.   Skin: He is not diaphoretic.       Significant Labs:  CBC:    Recent Labs  Lab 12/15/17  1135 12/16/17  0447 12/17/17  0449   WBC 6.46 6.10 5.76   RBC 3.11* 2.83* 2.74*   HGB 8.5* 7.7* 7.5*   HCT 27.4* 25.1* 24.0*    208 175   MCV 88 89 88   MCH 27.3 27.2 27.4   MCHC 31.0* 30.7* 31.3*     BNP:    Recent Labs  Lab 12/11/17  0413 12/13/17  0537 12/15/17  0450   * 337* 386*     CMP:    Recent Labs  Lab 12/11/17  0413  12/13/17  0537  12/15/17  0450 12/16/17  0447 12/17/17  0449   GLU 80  < > 76  < > 72 107 83   CALCIUM 8.4*  < > 8.7  < > 8.7 8.7 8.5*   ALBUMIN 2.7*  --  2.6*  --  2.7*  --   --    PROT 5.5*  --  5.5*  --  5.7*  --   --      < > 140  < > 142 137 136   K 3.4*  < > 3.4*  < > 3.6 4.2 4.2   CO2 29  < > 29  < > 26 27 26     < > 105  < > 108 103 102   BUN 27*  < > 15  < > 16 14 14   CREATININE 0.9  < > 1.0  < > 1.2 1.1 1.1   ALKPHOS 44*  --  45*  --  48*  --   --    ALT 10  --  9*  --  7*  --   --    AST 16  --  18  --  15  --   --    BILITOT 0.8  --  0.7  --  0.7  --   --    < > = values in this interval not displayed.   Coagulation:     Recent Labs  Lab 12/15/17  0450 12/16/17  0447 12/17/17  0449   INR 1.1 1.3* 1.6*   APTT 57.1* 66.1* 39.0*     LDH:    Recent Labs  Lab 12/15/17  0450 12/16/17  0447 12/17/17  0449    179 200      Microbiology:  Microbiology Results (last 7 days)     ** No results found for the last 168 hours. **          I have reviewed all pertinent labs within the past 24 hours.    Estimated Creatinine Clearance: 55.9 mL/min (based on SCr of 1.1 mg/dL).    Diagnostic Results:  I have reviewed all pertinent imaging results/findings within the past 24 hours.    Assessment and Plan:     66 y/o male with a PMHx of NICM (EF 10%) s/p HeartMate 3 Implanted 7/27/2017 as DT, HTN, DLD and recent ICD revision on 11/20/17 with Dr. Vidal (DC ICD placed with active RA/LV leads. RV lead capped during revision) that was complicated by pocket hematoma. He was discharged on 11/22/17 with plan to follow up in the [place] EP clinic in a week but had not had follow up yet given the recent thanksgiving holiday, admitted as a transfer after presenting to [place] with ICD shocks.  His ICD was reprogrammed in [facility] as the shocks for VT were inappropriate , rhythm was in fact Atrial Tachycardia. His INR was subtherapeutic and he was bridged with heparin. His hematoma was noted to be improving. He now presents with Hg 5.7 with suspected bleeding, found to have ileal ulcer currently awaiting path results an concomitant GI recs.    * LVAD (left ventricular assist device) present    -HeartMate 3 Implanted 7/27/17   -Speed set at 5200 rpm  -INR subtherapeutic. Restarted coumadin. Cont Heparin drip.  Boosting coumadint o get therapeutic  -LDH stable. Holding ASA in setting of GIB and ulcer found on VCE  - DT, Not listed d/t Hep B          Normocytic anemia    -s/p total of 5 units PRBCs  -GI following. EGD 12/8/17 showed Normal esophagus; Gastritis and duodenitis; No gastrointestinal bleeding seen.    -VCE done 12/12/17 showed ulcers.   -Enteroscopy done- showed single (solitary) ulcer in the proximal ileum which was biopsied. Stricture in the proximal ileum. Presence of ileal foreign body. Removal was successful. Will discuss plan  with GI  -CT enterography done with no new findings  -no further gi w/u  -Hg stable  -Continue with PPI and will hold off on iron as pt states he did not tolerate it. Also tried IV Iron but pt did not tolerate          Atrial tachycardia    Continue to monitor        Essential hypertension    -restarted home regimen- amlodipine and hydralazine; will monitor response            Casey Newman MD  Heart Transplant  [facility]

## 2017-12-17 NOTE — PLAN OF CARE
Problem: Patient Care Overview  Goal: Plan of Care Review  Plan of care discussed with patient. Fall precautions in place. Patient has no complaints of pain. Discussed medications and care. Heparin gtt continued. LVAD numbers and dopplers WNL. Patient has no questions at this time.White board updated. Bed locked in lowest position. Side rails up x2. Will continue to monitor.

## 2017-12-17 NOTE — PLAN OF CARE
Problem: Patient Care Overview  Goal: Plan of Care Review  Outcome: Ongoing (interventions implemented as appropriate)  No significant events during shift, no fall/injury. Denies chest pain or SOB. Heparin gtt infusing, anti-xa therapeutic, INR 1.6.

## 2017-12-17 NOTE — ASSESSMENT & PLAN NOTE
-HeartMate 3 Implanted 7/27/17   -Speed set at 5200 rpm  -INR subtherapeutic. Restarted coumadin. Cont Heparin drip.  Boosting coumadint o get therapeutic  -LDH stable. Holding ASA in setting of GIB and ulcer found on VCE  - DT, Not listed d/t Hep B

## 2017-12-17 NOTE — SUBJECTIVE & OBJECTIVE
Interval History: NAEON  Still subtherapeutic but with increase in INR with addition of 1 mg coumadin to standing dose    Continuous Infusions:   heparin 76638 units/250 mL dextrose 5% mixture (NO NOMOGRAM) 16 Units/kg/hr (12/16/17 2150)     Scheduled Meds:   amLODIPine  10 mg Oral Daily    furosemide  40 mg Oral BID    hydrALAZINE  50 mg Oral Q8H    magnesium oxide  400 mg Oral BID    pantoprazole  40 mg Oral BID AC    polyethylene glycol  17 g Oral Daily    potassium chloride  20 mEq Oral BID    pravastatin  20 mg Oral QHS    warfarin  1 mg Oral Once    warfarin  2 mg Oral Every Mon, Wed, Fri, Sun    warfarin  3 mg Oral Every Tues, Thurs, Sat     PRN Meds:sodium chloride, sodium chloride, ALPRAZolam, ondansetron, sodium chloride 0.9%, sodium chloride 0.9%    Review of patient's allergies indicates:  No Known Allergies  Objective:     Vital Signs (Most Recent):  Temp: 98.5 °F (36.9 °C) (12/17/17 0550)  Pulse: 73 (12/17/17 0700)  Resp: 18 (12/17/17 0550)  BP: (!) 87/50 (12/17/17 0550)  SpO2: 99 % (12/17/17 0550) Vital Signs (24h Range):  Temp:  [98.1 °F (36.7 °C)-98.6 °F (37 °C)] 98.5 °F (36.9 °C)  Pulse:  [64-88] 73  Resp:  [18] 18  SpO2:  [96 %-99 %] 99 %  BP: (70-95)/(0-65) 87/50     Patient Vitals for the past 72 hrs (Last 3 readings):   Weight   12/16/17 0700 60.6 kg (133 lb 9.6 oz)   12/15/17 0700 58.6 kg (129 lb 3 oz)     Body mass index is 20.31 kg/m².      Intake/Output Summary (Last 24 hours) at 12/17/17 1006  Last data filed at 12/17/17 0500   Gross per 24 hour   Intake            706.7 ml   Output             2400 ml   Net          -1693.3 ml       Hemodynamic Parameters:       Telemetry: no events    Physical Exam   Constitutional: He is oriented to person, place, and time. No distress.   HENT:   Head: Normocephalic and atraumatic.   Eyes: No scleral icterus.   Neck: Normal range of motion.   Cardiovascular: Normal rate.    LVAD hum present   Pulmonary/Chest: Effort normal and breath sounds  normal.   Abdominal: Soft. Bowel sounds are normal. He exhibits no distension and no mass. There is no tenderness. There is no rebound and no guarding. No hernia.   Musculoskeletal: Normal range of motion. He exhibits no edema.   Neurological: He is alert and oriented to person, place, and time.   Skin: He is not diaphoretic.       Significant Labs:  CBC:    Recent Labs  Lab 12/15/17  1135 12/16/17 0447 12/17/17 0449   WBC 6.46 6.10 5.76   RBC 3.11* 2.83* 2.74*   HGB 8.5* 7.7* 7.5*   HCT 27.4* 25.1* 24.0*    208 175   MCV 88 89 88   MCH 27.3 27.2 27.4   MCHC 31.0* 30.7* 31.3*     BNP:    Recent Labs  Lab 12/11/17  0413 12/13/17  0537 12/15/17  0450   * 337* 386*     CMP:    Recent Labs  Lab 12/11/17  0413  12/13/17  0537  12/15/17  0450 12/16/17 0447 12/17/17 0449   GLU 80  < > 76  < > 72 107 83   CALCIUM 8.4*  < > 8.7  < > 8.7 8.7 8.5*   ALBUMIN 2.7*  --  2.6*  --  2.7*  --   --    PROT 5.5*  --  5.5*  --  5.7*  --   --      < > 140  < > 142 137 136   K 3.4*  < > 3.4*  < > 3.6 4.2 4.2   CO2 29  < > 29  < > 26 27 26     < > 105  < > 108 103 102   BUN 27*  < > 15  < > 16 14 14   CREATININE 0.9  < > 1.0  < > 1.2 1.1 1.1   ALKPHOS 44*  --  45*  --  48*  --   --    ALT 10  --  9*  --  7*  --   --    AST 16  --  18  --  15  --   --    BILITOT 0.8  --  0.7  --  0.7  --   --    < > = values in this interval not displayed.   Coagulation:     Recent Labs  Lab 12/15/17  0450 12/16/17 0447 12/17/17 0449   INR 1.1 1.3* 1.6*   APTT 57.1* 66.1* 39.0*     LDH:    Recent Labs  Lab 12/15/17  0450 12/16/17  0447 12/17/17  0449    179 200     Microbiology:  Microbiology Results (last 7 days)     ** No results found for the last 168 hours. **          I have reviewed all pertinent labs within the past 24 hours.    Estimated Creatinine Clearance: 55.9 mL/min (based on SCr of 1.1 mg/dL).    Diagnostic Results:  I have reviewed all pertinent imaging results/findings within the past 24 hours.

## 2017-12-18 NOTE — SUBJECTIVE & OBJECTIVE
Interval History: No BM yesterday or overnight. Feeling well this am.      Continuous Infusions:   heparin 71473 units/250 mL dextrose 5% mixture (NO NOMOGRAM) 16 Units/kg/hr (12/16/17 2150)     Scheduled Meds:   amLODIPine  10 mg Oral Daily    furosemide  40 mg Oral BID    hydrALAZINE  50 mg Oral Q8H    magnesium oxide  400 mg Oral BID    pantoprazole  40 mg Oral BID AC    polyethylene glycol  17 g Oral Daily    potassium chloride  20 mEq Oral BID    pravastatin  20 mg Oral QHS    warfarin  3 mg Oral Once     PRN Meds:sodium chloride, sodium chloride, ALPRAZolam, ondansetron, sodium chloride 0.9%, sodium chloride 0.9%    Review of patient's allergies indicates:  No Known Allergies  Objective:     Vital Signs (Most Recent):  Temp: 98.6 °F (37 °C) (12/18/17 1235)  Pulse: 79 (12/18/17 1500)  Resp: 18 (12/18/17 1235)  BP: (!) 81/60 (12/18/17 1235)  SpO2: 97 % (12/18/17 1235) Vital Signs (24h Range):  Temp:  [98.2 °F (36.8 °C)-98.9 °F (37.2 °C)] 98.6 °F (37 °C)  Pulse:  [50-98] 79  Resp:  [18] 18  SpO2:  [92 %-99 %] 97 %  BP: ()/(0-68) 81/60     Patient Vitals for the past 72 hrs (Last 3 readings):   Weight   12/18/17 0800 59.7 kg (131 lb 9.8 oz)   12/16/17 0700 60.6 kg (133 lb 9.6 oz)     Body mass index is 20.01 kg/m².      Intake/Output Summary (Last 24 hours) at 12/18/17 1518  Last data filed at 12/18/17 1100   Gross per 24 hour   Intake            548.8 ml   Output             1800 ml   Net          -1251.2 ml       Hemodynamic Parameters:       Telemetry: no events    Physical Exam   Constitutional: He is oriented to person, place, and time. No distress.   HENT:   Head: Normocephalic and atraumatic.   Eyes: No scleral icterus.   Neck: Normal range of motion.   Cardiovascular: Normal rate.    LVAD hum present   Pulmonary/Chest: Effort normal and breath sounds normal.   Abdominal: Soft. Bowel sounds are normal. He exhibits no distension and no mass. There is no tenderness. There is no rebound and no  guarding. No hernia.   Musculoskeletal: Normal range of motion. He exhibits no edema.   Neurological: He is alert and oriented to person, place, and time.   Skin: He is not diaphoretic.       Significant Labs:  CBC:    Recent Labs  Lab 12/16/17 0447 12/17/17 0449 12/18/17  0543   WBC 6.10 5.76 4.98   RBC 2.83* 2.74* 2.93*   HGB 7.7* 7.5* 7.9*   HCT 25.1* 24.0* 26.0*    175 215   MCV 89 88 89   MCH 27.2 27.4 27.0   MCHC 30.7* 31.3* 30.4*     BNP:    Recent Labs  Lab 12/13/17  0537 12/15/17  0450 12/18/17  0543   * 386* 386*     CMP:    Recent Labs  Lab 12/13/17  0537  12/15/17  0450 12/16/17  0447 12/17/17  0449 12/18/17  0543   GLU 76  < > 72 107 83 72   CALCIUM 8.7  < > 8.7 8.7 8.5* 8.7   ALBUMIN 2.6*  --  2.7*  --   --  2.8*   PROT 5.5*  --  5.7*  --   --  5.9*     < > 142 137 136 136   K 3.4*  < > 3.6 4.2 4.2 4.2   CO2 29  < > 26 27 26 28     < > 108 103 102 101   BUN 15  < > 16 14 14 13   CREATININE 1.0  < > 1.2 1.1 1.1 1.0   ALKPHOS 45*  --  48*  --   --  46*   ALT 9*  --  7*  --   --  7*   AST 18  --  15  --   --  14   BILITOT 0.7  --  0.7  --   --  0.5   < > = values in this interval not displayed.   Coagulation:     Recent Labs  Lab 12/16/17 0447 12/17/17 0449 12/18/17  0543   INR 1.3* 1.6* 1.8*   APTT 66.1* 39.0* 67.7*     LDH:    Recent Labs  Lab 12/16/17 0447 12/17/17 0449 12/18/17  0543    200 177     Microbiology:  Microbiology Results (last 7 days)     ** No results found for the last 168 hours. **          I have reviewed all pertinent labs within the past 24 hours.    Estimated Creatinine Clearance: 60.5 mL/min (based on SCr of 1 mg/dL).    Diagnostic Results:  I have reviewed all pertinent imaging results/findings within the past 24 hours.

## 2017-12-18 NOTE — PROGRESS NOTES
[facility]  Heart Transplant  Progress Note    Patient Name: Suman Hayden  MRN: [id number]  Admission Date: 12/6/2017  Hospital Length of Stay: 12 days  Attending Physician: Graciela Bautista MD  Primary Care Provider: Joe Ernst MD  Principal Problem:LVAD (left ventricular assist device) present    Subjective:     Interval History: No BM yesterday or overnight. Feeling well this am.      Continuous Infusions:   heparin 78527 units/250 mL dextrose 5% mixture (NO NOMOGRAM) 16 Units/kg/hr (12/16/17 2150)     Scheduled Meds:   amLODIPine  10 mg Oral Daily    furosemide  40 mg Oral BID    hydrALAZINE  50 mg Oral Q8H    magnesium oxide  400 mg Oral BID    pantoprazole  40 mg Oral BID AC    polyethylene glycol  17 g Oral Daily    potassium chloride  20 mEq Oral BID    pravastatin  20 mg Oral QHS    warfarin  3 mg Oral Once     PRN Meds:sodium chloride, sodium chloride, ALPRAZolam, ondansetron, sodium chloride 0.9%, sodium chloride 0.9%    Review of patient's allergies indicates:  No Known Allergies  Objective:     Vital Signs (Most Recent):  Temp: 98.6 °F (37 °C) (12/18/17 1235)  Pulse: 79 (12/18/17 1500)  Resp: 18 (12/18/17 1235)  BP: (!) 81/60 (12/18/17 1235)  SpO2: 97 % (12/18/17 1235) Vital Signs (24h Range):  Temp:  [98.2 °F (36.8 °C)-98.9 °F (37.2 °C)] 98.6 °F (37 °C)  Pulse:  [50-98] 79  Resp:  [18] 18  SpO2:  [92 %-99 %] 97 %  BP: ()/(0-68) 81/60     Patient Vitals for the past 72 hrs (Last 3 readings):   Weight   12/18/17 0800 59.7 kg (131 lb 9.8 oz)   12/16/17 0700 60.6 kg (133 lb 9.6 oz)     Body mass index is 20.01 kg/m².      Intake/Output Summary (Last 24 hours) at 12/18/17 1518  Last data filed at 12/18/17 1100   Gross per 24 hour   Intake            548.8 ml   Output             1800 ml   Net          -1251.2 ml       Hemodynamic Parameters:       Telemetry: no events    Physical Exam   Constitutional: He is oriented to person, place, and time. No distress.    HENT:   Head: Normocephalic and atraumatic.   Eyes: No scleral icterus.   Neck: Normal range of motion.   Cardiovascular: Normal rate.    LVAD hum present   Pulmonary/Chest: Effort normal and breath sounds normal.   Abdominal: Soft. Bowel sounds are normal. He exhibits no distension and no mass. There is no tenderness. There is no rebound and no guarding. No hernia.   Musculoskeletal: Normal range of motion. He exhibits no edema.   Neurological: He is alert and oriented to person, place, and time.   Skin: He is not diaphoretic.       Significant Labs:  CBC:    Recent Labs  Lab 12/16/17 0447 12/17/17 0449 12/18/17  0543   WBC 6.10 5.76 4.98   RBC 2.83* 2.74* 2.93*   HGB 7.7* 7.5* 7.9*   HCT 25.1* 24.0* 26.0*    175 215   MCV 89 88 89   MCH 27.2 27.4 27.0   MCHC 30.7* 31.3* 30.4*     BNP:    Recent Labs  Lab 12/13/17  0537 12/15/17  0450 12/18/17  0543   * 386* 386*     CMP:    Recent Labs  Lab 12/13/17  0537  12/15/17  0450 12/16/17 0447 12/17/17 0449 12/18/17  0543   GLU 76  < > 72 107 83 72   CALCIUM 8.7  < > 8.7 8.7 8.5* 8.7   ALBUMIN 2.6*  --  2.7*  --   --  2.8*   PROT 5.5*  --  5.7*  --   --  5.9*     < > 142 137 136 136   K 3.4*  < > 3.6 4.2 4.2 4.2   CO2 29  < > 26 27 26 28     < > 108 103 102 101   BUN 15  < > 16 14 14 13   CREATININE 1.0  < > 1.2 1.1 1.1 1.0   ALKPHOS 45*  --  48*  --   --  46*   ALT 9*  --  7*  --   --  7*   AST 18  --  15  --   --  14   BILITOT 0.7  --  0.7  --   --  0.5   < > = values in this interval not displayed.   Coagulation:     Recent Labs  Lab 12/16/17  0447 12/17/17  0449 12/18/17  0543   INR 1.3* 1.6* 1.8*   APTT 66.1* 39.0* 67.7*     LDH:    Recent Labs  Lab 12/16/17  0447 12/17/17  0449 12/18/17  0543    200 177     Microbiology:  Microbiology Results (last 7 days)     ** No results found for the last 168 hours. **          I have reviewed all pertinent labs within the past 24 hours.    Estimated Creatinine Clearance: 60.5 mL/min (based  on SCr of 1 mg/dL).    Diagnostic Results:  I have reviewed all pertinent imaging results/findings within the past 24 hours.    Assessment and Plan:     66 y/o male with a PMHx of NICM (EF 10%) s/p HeartMate 3 Implanted 7/27/2017 as DT, HTN, DLD and recent ICD revision on 11/20/17 with Dr. Vidal (DC ICD placed with active RA/LV leads. RV lead capped during revision) that was complicated by pocket hematoma. He was discharged on 11/22/17 with plan to follow up in the [place] EP clinic in a week but had not had follow up yet given the recent thanksgiving holiday, admitted as a transfer after presenting to [place] with ICD shocks.  His ICD was reprogrammed in [facility] as the shocks for VT were inappropriate , rhythm was in fact Atrial Tachycardia. His INR was subtherapeutic and he was bridged with heparin. His hematoma was noted to be improving. He now presents with Hg 5.7 with suspected bleeding, found to have ileal ulcer currently awaiting path results an concomitant GI recs.    * LVAD (left ventricular assist device) present    -HeartMate 3 Implanted 7/27/17   -Speed set at 5200 rpm  -INR subtherapeutic. Cont coumadin. Cont Heparin drip.    -LDH stable, 100s. Holding ASA in setting of GIB and ulcer found on VCE  - DT, Not listed d/t Hep B          Normocytic anemia    -s/p total of 5 units PRBCs  -GI following. EGD 12/8/17 showed Normal esophagus; Gastritis and duodenitis; No gastrointestinal bleeding seen.    -VCE done 12/12/17 showed ulcers.   -Enteroscopy done- showed single (solitary) ulcer in the proximal ileum which was biopsied. Stricture in the proximal ileum. Presence of ileal foreign body. Removal was successful.   -CT enterography done- per GI, no further procedures. Plan for f/u in GI clinic  -Hg stable  -Continue with PPI and will hold off on iron as pt states he did not tolerate it. Also tried IV Iron but pt did not tolerate          Atrial tachycardia    Continue to monitor         Essential hypertension    -restarted home regimen- amlodipine and hydralazine; will monitor response            Susannah Elizabeth PA-C  Heart Transplant  [facility]

## 2017-12-18 NOTE — ASSESSMENT & PLAN NOTE
-s/p total of 5 units PRBCs  -GI following. EGD 12/8/17 showed Normal esophagus; Gastritis and duodenitis; No gastrointestinal bleeding seen.    -VCE done 12/12/17 showed ulcers.   -Enteroscopy done- showed single (solitary) ulcer in the proximal ileum which was biopsied. Stricture in the proximal ileum. Presence of ileal foreign body. Removal was successful.   -CT enterography done- per GI, no further procedures. Plan for f/u in GI clinic  -Hg stable  -Continue with PPI and will hold off on iron as pt states he did not tolerate it. Also tried IV Iron but pt did not tolerate

## 2017-12-18 NOTE — PLAN OF CARE
Problem: Patient Care Overview  Goal: Plan of Care Review  Outcome: Ongoing (interventions implemented as appropriate)  Discussed Plan of Care with patient. VS stable. LVAD numbers within range and history free of events. Ambulates well with 1-person assist. Fall precautions in place. Bleeding precautions reviewed and maintained. Pain denied. No growth/ change to hematoma to upper (L) chest wall. Heparin infusing per order and AntiXa 0.35. Lactulose x1. Patient remained free of falls/ injury. All questions and concerns were addressed. Will continue to monitor patient.

## 2017-12-18 NOTE — ASSESSMENT & PLAN NOTE
Daily E and M and VAD Interrogation Note    Reason for Visit: Low H&H  Patient is seen in follow up for management of:  [x] HeartMate III    Interval History:  The [x] implant date was 7/27/2017  Events overnight reviewed     Review of Systems:  All systems reviewed and negative    Medications:  Current Facility-Administered Medications   Medication Dose Route Frequency Provider Last Rate Last Dose    0.9%  NaCl infusion (for blood administration)   Intravenous Q24H PRN MELISSA Wilson        0.9%  NaCl infusion (for blood administration)   Intravenous Q24H PRN MELISSA Jon        ALPRAZolam tablet 0.25 mg  0.25 mg Oral BID PRN Abigail Green PA-C        amLODIPine tablet 10 mg  10 mg Oral Daily MELISSA Wilson   10 mg at 12/18/17 0934    furosemide tablet 40 mg  40 mg Oral BID MELISSA Wilson   40 mg at 12/18/17 0934    heparin 37511 units/250 mL dextrose 5% mixture (NO NOMOGRAM)  16 Units/kg/hr Intravenous Continuous Baljinder Negrete MD 9.7 mL/hr at 12/16/17 2150 16 Units/kg/hr at 12/16/17 2150    hydrALAZINE tablet 50 mg  50 mg Oral Q8H MELISSA Wilson   50 mg at 12/18/17 0522    magnesium oxide tablet 400 mg  400 mg Oral BID MELISSA Wilson   400 mg at 12/18/17 0934    ondansetron injection 8 mg  8 mg Intravenous Q8H PRN MELISSA Jon   8 mg at 12/13/17 1948    pantoprazole EC tablet 40 mg  40 mg Oral BID AC Graciela Bautista MD   40 mg at 12/18/17 0522    polyethylene glycol packet 17 g  17 g Oral Daily Susannah Elizabeth PA-C   17 g at 12/18/17 0934    potassium chloride CR capsule 20 mEq  20 mEq Oral BID Susannah Elizabeth PA-C   20 mEq at 12/18/17 0934    pravastatin tablet 20 mg  20 mg Oral QHS MELISSA Wilson   20 mg at 12/17/17 2218    sodium chloride 0.9% flush 3 mL  3 mL Intravenous PRN Anne Alex MD        sodium chloride 0.9% flush 5 mL  5 mL Intravenous PRN MELISSA Jon        warfarin (COUMADIN) tablet 2  mg  2 mg Oral Every Mon, Wed, Fri, Sun Graciela Bautista MD   2 mg at 12/17/17 1742    warfarin (COUMADIN) tablet 3 mg  3 mg Oral Every Tues, Thurs, Sat Graciela Bautista MD   3 mg at 12/16/17 1710       Physical Examination:  Vital Signs:   Vitals:    12/18/17 1115   BP: (!) 72/0   Pulse:    Resp:    Temp:      Cardiovascular:  [x] Regular rate and rhythm [] Irregular []  None (MATT) []  Other  [x]  No edema []  Edema present  [x]  Clear to auscultation  VAD hum smooth  Skin:  Incision is [x]  Clean, dry and intact.    Sternum:  [x]  Stable   Driveline(s):   [x]  Clean, dry and intact.       Procedure:  Device Interrogation including analysis of device parameters.  Current Settings   Review of device function is [x]  Stable     TXP LVAD INTERROGATIONS 12/18/2017 12/18/2017 12/18/2017 12/18/2017 12/17/2017 12/17/2017 12/17/2017   Type HeartMate3 HeartMate3 HeartMate3 HeartMate3 HeartMate3 HeartMate3 HeartMate3   Flow 4.1 4.3 4.1 4.0 3.9 4.1 4.7   Speed 5200 5200 5200 5200 5200 5200 5200   PI 3.6 3.6 3.6 3.6 3.6 3.0 3.8   Power ([place]) 3.5 3.5 3.5 3.5 3.5 3.6 3.5   LSL - 4800 - - - - -   Pulsatility - - - - - - -       Assessment:  [x]  Primary Cardiomyopathy [x]  Congestive Heart Failure   []  Atrial Fibrillation []  Ventricular Tachycardia   []  Aftercare cardiac device [x]  Long term (current) use of anticoagulants   []  Ventilator-associated pneumonia []  Pneumonia viral, unspecified   []  Pneumonia, bacterial, unspecified []  Pneumonia, organism unspecified   [x]  Hemorrhage of GI tract, unspecified    []  Nosebleed []  Driveline infection      [x]  anemia         Plan:  [x]  Interval history obtained from HTS attending team member during rounding today  []  VAD/MATT teaching performed with patient  [x]  Mobilization / Physical Therapy ongoing  [x]  Anticoagulation [x]  Ongoing []  Held      Total time spent was 36 minutes.  Of which more than 50 percent of the care dominated counseling and coordinating care with  different team members. The VAD was interrogated and all parameters were WNL and no significant findings were found in the history. All these findings are documented in the note above.      Date of Service: 12/18/2017

## 2017-12-18 NOTE — ASSESSMENT & PLAN NOTE
Nutrition Problem  Unintended weight loss    Related to (etiology):   Complicated post-op course    Signs and Symptoms (as evidenced by):   BMI 20.1 and wt loss of 38.1% UBW over past 6 mo    Interventions/Recommendations (treatment strategy):  See recs.    Nutrition Diagnosis Status:   New

## 2017-12-18 NOTE — PROGRESS NOTES
UPDATE    SW to pt's room for update today.  Pt presents as lying in bed with wife Kia at bedside.  Pt and wife both present as aao x4, calm, cooperative, and asking and answering questions appropriately.  Pt reports feeling well today.  Pt and wife report that, per discussion with doctors today, pt will likely be ready to d/c once INR is therapeutic (INR is 1.8 today).  Pt requesting to resume services with [place] [facility] at d/c.  Pt and wife both report coping adequately at this time, and deny any needs or concerns to SW.  SW providing ongoing psychosocial and counseling support, education, resources, assistance, and discharge planning as indicated.  SW following and remains available.

## 2017-12-18 NOTE — PROGRESS NOTES
[facility]  Cardiothoracic Surgery  Progress Note    Patient Name: Suman Hayden  MRN: [id number]  Admission Date: 12/6/2017  Hospital Length of Stay: 12 days  Code Status: Full Code   Attending Physician: Graciela Bautista MD   Referring Provider: Angie Torres MD  Principal Problem:LVAD (left ventricular assist device) present    Subjective:     Post-Op Info:  Procedure(s) (LRB):  DEVICE ASSISTED ENTEROSCOPY-ANTEROGRADE (N/A)   4 Days Post-Op     Subjective:     Post-Op Info:  Procedure(s) (LRB):  DEVICE ASSISTED ENTEROSCOPY-ANTEROGRADE (N/A)   4 Days Post-Op      Reason for Visit:  Patient is seen in follow up management of low H & H    Interval History:  No distress nor complaints       Medications:  Continuous Infusions:   heparin 28960 units/250 mL dextrose 5% mixture (NO NOMOGRAM) 16 Units/kg/hr (12/16/17 2150)     Scheduled Meds:   amLODIPine  10 mg Oral Daily    furosemide  40 mg Oral BID    hydrALAZINE  50 mg Oral Q8H    magnesium oxide  400 mg Oral BID    pantoprazole  40 mg Oral BID AC    polyethylene glycol  17 g Oral Daily    potassium chloride  20 mEq Oral BID    pravastatin  20 mg Oral QHS    warfarin  2 mg Oral Every Mon, Wed, Fri, Sun    warfarin  3 mg Oral Every Tues, Thurs, Sat     PRN Meds:sodium chloride, sodium chloride, ALPRAZolam, ondansetron, sodium chloride 0.9%, sodium chloride 0.9%     Objective:     Vital Signs (Most Recent):  Temp: 98.7 °F (37.1 °C) (12/18/17 0915)  Pulse: (!) 50 (12/18/17 1100)  Resp: 18 (12/18/17 0915)  BP: (!) 72/0 (12/18/17 1115)  SpO2: 98 % (12/18/17 0915) Vital Signs (24h Range):  Temp:  [98.2 °F (36.8 °C)-98.9 °F (37.2 °C)] 98.7 °F (37.1 °C)  Pulse:  [50-98] 50  Resp:  [18] 18  SpO2:  [92 %-99 %] 98 %  BP: ()/(0-68) 72/0       Intake/Output Summary (Last 24 hours) at 12/18/17 1158  Last data filed at 12/18/17 1100   Gross per 24 hour   Intake            866.4 ml   Output             2400 ml   Net          -1533.6 ml        Physical Exam    Significant Labs:  BMP:   Recent Labs  Lab 12/18/17  0543   GLU 72      K 4.2      CO2 28   BUN 13   CREATININE 1.0   CALCIUM 8.7   MG 2.3     CBC:   Recent Labs  Lab 12/18/17  0543   WBC 4.98   RBC 2.93*   HGB 7.9*   HCT 26.0*      MCV 89   MCH 27.0   MCHC 30.4*     Procedure: Device Interrogation Including analysis of device parameters  Current Settings: Ventricular Assist Device  Review of device function is stable  TXP LVAD INTERROGATIONS 12/18/2017 12/18/2017 12/18/2017 12/18/2017 12/17/2017 12/17/2017 12/17/2017   Type HeartMate3 HeartMate3 HeartMate3 HeartMate3 HeartMate3 HeartMate3 HeartMate3   Flow 4.1 4.3 4.1 4.0 3.9 4.1 4.7   Speed 5200 5200 5200 5200 5200 5200 5200   PI 3.6 3.6 3.6 3.6 3.6 3.0 3.8   Power ([place]) 3.5 3.5 3.5 3.5 3.5 3.6 3.5   LSL - 4800 - - - - -   Pulsatility - - - - - - -     Assessment/Plan:     * LVAD (left ventricular assist device) present    Daily E and M and VAD Interrogation Note    Reason for Visit: Low H&H  Patient is seen in follow up for management of:  [x] HeartMate III    Interval History:  The [x] implant date was 7/27/2017  Events overnight reviewed     Review of Systems:  All systems reviewed and negative    Medications:  Current Facility-Administered Medications   Medication Dose Route Frequency Provider Last Rate Last Dose    0.9%  NaCl infusion (for blood administration)   Intravenous Q24H PRN MELISSA Wilson        0.9%  NaCl infusion (for blood administration)   Intravenous Q24H PRN MELISSA Jon        ALPRAZolam tablet 0.25 mg  0.25 mg Oral BID PRN Abigail Green PA-C        amLODIPine tablet 10 mg  10 mg Oral Daily MELISSA Wilson   10 mg at 12/18/17 0934    furosemide tablet 40 mg  40 mg Oral BID MELISSA Wilson   40 mg at 12/18/17 0934    heparin 56150 units/250 mL dextrose 5% mixture (NO NOMOGRAM)  16 Units/kg/hr Intravenous Continuous Baljinder Negrete MD 9.7 mL/hr at 12/16/17 6902  16 Units/kg/hr at 12/16/17 2150    hydrALAZINE tablet 50 mg  50 mg Oral Q8H MELISSA Wilson   50 mg at 12/18/17 0522    magnesium oxide tablet 400 mg  400 mg Oral BID MELISSA Wilson   400 mg at 12/18/17 0934    ondansetron injection 8 mg  8 mg Intravenous Q8H PRN MELISSA Jon   8 mg at 12/13/17 1948    pantoprazole EC tablet 40 mg  40 mg Oral BID AC Graciela Bautista MD   40 mg at 12/18/17 0522    polyethylene glycol packet 17 g  17 g Oral Daily Susannah Elizabeth PA-C   17 g at 12/18/17 0934    potassium chloride CR capsule 20 mEq  20 mEq Oral BID Susannah Elizabeth PA-C   20 mEq at 12/18/17 0934    pravastatin tablet 20 mg  20 mg Oral QHS MELISSA Wilson   20 mg at 12/17/17 2218    sodium chloride 0.9% flush 3 mL  3 mL Intravenous PRN Anne Alex MD        sodium chloride 0.9% flush 5 mL  5 mL Intravenous PRN MELISSA Jon        warfarin (COUMADIN) tablet 2 mg  2 mg Oral Every Mon, Wed, Fri, Sun Graciela Bautista MD   2 mg at 12/17/17 1742    warfarin (COUMADIN) tablet 3 mg  3 mg Oral Every Tues, Thurs, Sat Graciela Bautista MD   3 mg at 12/16/17 1710       Physical Examination:  Vital Signs:   Vitals:    12/18/17 1115   BP: (!) 72/0   Pulse:    Resp:    Temp:      Cardiovascular:  [x] Regular rate and rhythm [] Irregular []  None (MATT) []  Other  [x]  No edema []  Edema present  [x]  Clear to auscultation  VAD hum smooth  Skin:  Incision is [x]  Clean, dry and intact.    Sternum:  [x]  Stable   Driveline(s):   [x]  Clean, dry and intact.       Procedure:  Device Interrogation including analysis of device parameters.  Current Settings   Review of device function is [x]  Stable     TXP LVAD INTERROGATIONS 12/18/2017 12/18/2017 12/18/2017 12/18/2017 12/17/2017 12/17/2017 12/17/2017   Type HeartMate3 HeartMate3 HeartMate3 HeartMate3 HeartMate3 HeartMate3 HeartMate3   Flow 4.1 4.3 4.1 4.0 3.9 4.1 4.7   Speed 5200 5200 5200 5200 5200 5200 5200   PI 3.6 3.6  3.6 3.6 3.6 3.0 3.8   Power ([place]) 3.5 3.5 3.5 3.5 3.5 3.6 3.5   LSL - 4800 - - - - -   Pulsatility - - - - - - -       Assessment:  [x]  Primary Cardiomyopathy [x]  Congestive Heart Failure   []  Atrial Fibrillation []  Ventricular Tachycardia   []  Aftercare cardiac device [x]  Long term (current) use of anticoagulants   []  Ventilator-associated pneumonia []  Pneumonia viral, unspecified   []  Pneumonia, bacterial, unspecified []  Pneumonia, organism unspecified   [x]  Hemorrhage of GI tract, unspecified    []  Nosebleed []  Driveline infection      [x]  anemia         Plan:  [x]  Interval history obtained from HTS attending team member during rounding today  []  VAD/MATT teaching performed with patient  [x]  Mobilization / Physical Therapy ongoing  [x]  Anticoagulation [x]  Ongoing []  Held      Total time spent was 36 minutes.  Of which more than 50 percent of the care dominated counseling and coordinating care with different team members. The VAD was interrogated and all parameters were WNL and no significant findings were found in the history. All these findings are documented in the note above.      Date of Service: 12/18/2017                           Geovanna Marques NP  Cardiothoracic Surgery  [facility]

## 2017-12-18 NOTE — PROGRESS NOTES
[facility]  Adult Nutrition  Progress Note    SUMMARY     Recommendations    Recommendation/Intervention:   1. Continue current regular diet with ONS.   2. Encourage PO intake.   3. Continue current bowel regimen and encourage high fiber foods for constipation.   4. RD following.    Goals: Intake >/=85% EEN/EPN  Nutrition Goal Status: new  Communication of RD Recs: reviewed with RN    Reason for Assessment    Reason for Assessment: length of stay  Diagnosis: cardiac disease (LVAD present)  Relevant Medical History: HLD, CM s/p LVAD (7/2017), s/p AICD, HTN         General Information Comments: Pt reports good appetite. Endorses constipation. Will start Miralax per pt. Pt with significant wt loss over past 6 months since LVAD placement. Had long discussion about increasing caloric intake at home. Pt with GIB with ulcer in ileum and gastritis/duodenitis.    Nutrition Discharge Planning: Adequate PO intake to prevent further wt loss    Nutrition Prescription Ordered    Current Diet Order: Regular  Oral Nutrition Supplement: Boost Plus TID     Evaluation of Received Nutrients/Fluid Intake     I/O: -1L x 24 hrs (-10.2L since admit)      Comments: LBM 12/13     % Intake of Estimated Energy Needs: 50 - 75 %  % Meal Intake: 75%     Nutrition Risk Screen     Nutrition Risk Screen: no indicators present    Nutrition/Diet History    Patient Reported Diet/Restrictions/Preferences: general  Food Preferences: No cultural/Orthodoxy preferences noted.  Supplemental Drinks or Food Habits: Nepro (receives from a friend)  Factors Affecting Nutritional Intake:  (none)    Labs/Tests/Procedures/Meds    Pertinent Labs Reviewed: reviewed, pertinent  Pertinent Labs Comments: Alb 2.8  Pertinent Medications Reviewed: reviewed, pertinent  Pertinent Medications Comments: lasix, Mg, pantoprazole, polyethylene glycol, KCl, statin, warfarin    Physical Findings    Overall Physical Appearance: generalized wasting     Oral/Mouth  "Cavity: tooth/teeth missing  Skin: non-healing wound(s), pressure ulcer(s) (gluteal cleft; stage II coccyx)    Anthropometrics    Temp: 98.6 °F (37 °C)     Height: 5' 8" (172.7 cm)  Weight Method: Standard Scale  Weight: 59.7 kg (131 lb 9.8 oz)  Ideal Body Weight (IBW), Male: 154 lb     % Ideal Body Weight, Male (lb): 85.47 lb     BMI (Calculated): 20.1     Usual Body Weight (UBW), k.4 kg  % Usual Body Weight: 62.06  % Weight Change From Usual Weight: -38.07 %    Estimated/Assessed Needs    Weight Used For Calorie Calculations: 59.7 kg (131 lb 9.8 oz)   Energy Calorie Requirements (kcal):   Energy Need Method: Kcal/kg (35)  RMR ([place]-St. Jeor Equation): 1346.5     Weight Used For Protein Calculations: 59.7 kg (131 lb 9.8 oz)  Protein Requirements: 60-72 gm (1.0-1.2 gm/kg)     Fluid Need Method: RDA Method (1 ml/kcal or per MD)  RDA Method (mL):       Assessment and Plan    * LVAD (left ventricular assist device) present    Nutrition Problem  Unintended weight loss    Related to (etiology):   Complicated post-op course    Signs and Symptoms (as evidenced by):   BMI 20.1 and wt loss of 38.1% UBW over past 6 mo    Interventions/Recommendations (treatment strategy):  See recs.    Nutrition Diagnosis Status:   New            Monitor and Evaluation    Food and Nutrient Intake: energy intake, food and beverage intake  Food and Nutrient Adminstration: diet order  Knowledge/Beliefs/Attitudes: food and nutrition knowledge/skill  Physical Activity and Function: nutrition-related ADLs and IADLs  Anthropometric Measurements: weight, weight change, body mass index  Biochemical Data, Medical Tests and Procedures: electrolyte and renal panel, gastrointestinal profile, glucose/endocrine profile, inflammatory profile  Nutrition-Focused Physical Findings: overall appearance    Nutrition Risk    Level of Risk:  (F/u 1x weekly)    Nutrition Follow-Up    RD Follow-up?: Yes  "

## 2017-12-18 NOTE — PLAN OF CARE
Problem: Patient Care Overview  Goal: Plan of Care Review  Plan of care discussed with patient. Fall precautions in place. Patient has no complaints of pain. Discussed medications and care. Heparin gtt continued. VAD sounds present. VAD numbers and dopplers WNL. Patient has no questions at this time.White board updated. Bed locked in lowest position. Side rails up x2. Will continue to monitor.

## 2017-12-18 NOTE — ASSESSMENT & PLAN NOTE
-HeartMate 3 Implanted 7/27/17   -Speed set at 5200 rpm  -INR subtherapeutic. Cont coumadin. Cont Heparin drip.    -LDH stable, 100s. Holding ASA in setting of GIB and ulcer found on VCE  - DT, Not listed d/t Hep B

## 2017-12-18 NOTE — PROCEDURES
Patient in bed, wife at bedside. VAD interrogation completed this AM in the event changes needed to be made. Will continue to monitor for further issues.     Pulsatile: Yes   VAD Sounds: HM3  Smooth  Problems / Issues / Alarms with VAD if any: None noted  HCT: 26       VAD Interrogation:  TXP LVAD INTERROGATIONS 12/18/2017 12/18/2017 12/18/2017 12/18/2017 12/17/2017 12/17/2017 12/17/2017   Type HeartMate3 HeartMate3 HeartMate3 HeartMate3 HeartMate3 HeartMate3 HeartMate3   Flow 4.1 4.3 4.1 4.0 3.9 4.1 4.7   Speed 5200 5200 5200 5200 5200 5200 5200   PI 3.6 3.6 3.6 3.6 3.6 3.0 3.8   Power ([place]) 3.5 3.5 3.5 3.5 3.5 3.6 3.5   LSL - 4800 - - - - -   Pulsatility - - - - - - -   }

## 2017-12-18 NOTE — SUBJECTIVE & OBJECTIVE
Subjective:     Post-Op Info:  Procedure(s) (LRB):  DEVICE ASSISTED ENTEROSCOPY-ANTEROGRADE (N/A)   4 Days Post-Op      Reason for Visit:  Patient is seen in follow up management of low H & H    Interval History:  No distress nor complaints       Medications:  Continuous Infusions:   heparin 66417 units/250 mL dextrose 5% mixture (NO NOMOGRAM) 16 Units/kg/hr (12/16/17 2150)     Scheduled Meds:   amLODIPine  10 mg Oral Daily    furosemide  40 mg Oral BID    hydrALAZINE  50 mg Oral Q8H    magnesium oxide  400 mg Oral BID    pantoprazole  40 mg Oral BID AC    polyethylene glycol  17 g Oral Daily    potassium chloride  20 mEq Oral BID    pravastatin  20 mg Oral QHS    warfarin  2 mg Oral Every Mon, Wed, Fri, Sun    warfarin  3 mg Oral Every Tues, Thurs, Sat     PRN Meds:sodium chloride, sodium chloride, ALPRAZolam, ondansetron, sodium chloride 0.9%, sodium chloride 0.9%     Objective:     Vital Signs (Most Recent):  Temp: 98.7 °F (37.1 °C) (12/18/17 0915)  Pulse: (!) 50 (12/18/17 1100)  Resp: 18 (12/18/17 0915)  BP: (!) 72/0 (12/18/17 1115)  SpO2: 98 % (12/18/17 0915) Vital Signs (24h Range):  Temp:  [98.2 °F (36.8 °C)-98.9 °F (37.2 °C)] 98.7 °F (37.1 °C)  Pulse:  [50-98] 50  Resp:  [18] 18  SpO2:  [92 %-99 %] 98 %  BP: ()/(0-68) 72/0       Intake/Output Summary (Last 24 hours) at 12/18/17 1158  Last data filed at 12/18/17 1100   Gross per 24 hour   Intake            866.4 ml   Output             2400 ml   Net          -1533.6 ml       Physical Exam    Significant Labs:  BMP:   Recent Labs  Lab 12/18/17  0543   GLU 72      K 4.2      CO2 28   BUN 13   CREATININE 1.0   CALCIUM 8.7   MG 2.3     CBC:   Recent Labs  Lab 12/18/17  0543   WBC 4.98   RBC 2.93*   HGB 7.9*   HCT 26.0*      MCV 89   MCH 27.0   MCHC 30.4*     Procedure: Device Interrogation Including analysis of device parameters  Current Settings: Ventricular Assist Device  Review of device function is stable  TXP LVAD  INTERROGATIONS 12/18/2017 12/18/2017 12/18/2017 12/18/2017 12/17/2017 12/17/2017 12/17/2017   Type HeartMate3 HeartMate3 HeartMate3 HeartMate3 HeartMate3 HeartMate3 HeartMate3   Flow 4.1 4.3 4.1 4.0 3.9 4.1 4.7   Speed 5200 5200 5200 5200 5200 5200 5200   PI 3.6 3.6 3.6 3.6 3.6 3.0 3.8   Power ([place]) 3.5 3.5 3.5 3.5 3.5 3.6 3.5   LSL - 4800 - - - - -   Pulsatility - - - - - - -

## 2017-12-18 NOTE — PLAN OF CARE
Problem: Patient Care Overview  Goal: Plan of Care Review    Recommendations     Recommendation/Intervention:   1. Continue current regular diet with ONS.   2. Encourage PO intake.   3. Continue current bowel regimen and encourage high fiber foods for constipation.   4. RD following.     Goals: Intake >/=85% EEN/EPN  Nutrition Goal Status: new

## 2017-12-19 NOTE — PROGRESS NOTES
Additional nutrition recommendations per discussion on rounds.    Pt has been seen by outpt RD who documented decreasing appetite and wt since 10/2017. Pt has been drinking Boost/Nepro since that time. Received education on high protein, high calorie diet by both outpt and inpt RD this admit. Pt with wt decrease of 11 lb this admit, but I&O's showing pt -11 L since admit.    Recommendations:  1. Pt would benefit from an appetite stimulant while admitted and after discharge.  2. Continue current Boost Plus ONS TID.   -Boost contains 32 mcg vit K per 8 oz serving and Nepro contains 20 mcg vit K.  3. Intake has been varied during admit ranging from % of meals. Pt might benefit from a calorie count if not discharged in next 3-4 days.    Please contact me with further concerns or clarification.    Kely Montiel, RADHA/LDN  [id number]

## 2017-12-19 NOTE — PROGRESS NOTES
DISCHARGE    SW to pt's room for discharge today.  Pt presents as sitting up in bed with wife Kia at bedside.  Pt and wife both present as aao x4, calm, cooperative, and asking and answering questions appropriately.  Pt and wife verbalize understanding and agreement with plan to d/c to home today with HH.  SW notified Ryanne with [facility] of d/c today; Ryanne will notify [place] .  Pt's wife will transport pt home today.  Pt reports coping adequately at this time and reports he is very excited for discharge.  Pt and wife deny any needs or concerns to SW at this time.  SW providing ongoing psychosocial and counseling support, education, resources, assistance, and discharge planning as indicated.  SW following and remains available.

## 2017-12-19 NOTE — PHYSICIAN QUERY
"PT Name: Suman Hayden  MR #: [id number]    Physician Query Form - Nutrition Clarification     CDS/: Jeni Valle               Contact information: [email or web address]     This form is a permanent document in the medical record.     Query Date: December 19, 2017    By submitting this query, we are merely seeking further clarification of documentation.. Please utilize your independent clinical judgment when addressing the question(s) below.    The Medical record contains the following:   Indicators  Supporting Clinical Findings Location in Medical Record   X % of Estimated Energy Intake over a time frame from p.o., TF, or TPN % Intake of Estimated Energy Needs: 50 - 75 % Nutrition PN 12/18   X Weight Status over a time frame Pt with significant wt loss over past 6 months since LVAD placement    % Weight Change From Usual Weight: -38.07 %   Nutrition PN 12/18   X Subcutaneous Fat and/or Muscle Loss Overall Physical Appearance: generalized wasting  Oral/Mouth Cavity: tooth/teeth missing  Skin: non-healing wound(s), pressure ulcer(s) (gluteal cleft; stage II coccyx)    Nutrition PN 12/18    Fluid Accumulation or Edema      Reduced  Strength     X Wt / BMI / Usual Body Weight Height: 5' 8" (172.7 cm)  Weight: 59.7 kg (131 lb 9.8 oz)  BMI (Calculated): 20.1 Nutrition PN 12/18    Delayed Wound Healing / Failure to Thrive      Acute or Chronic Illness      Medication     X Treatment Oral Nutrition Supplement: Boost Plus TID    Nutrition PN 12/18   X Other BMI 20.1 and wt loss of 38.1% UBW over past 6 mo Nutrition PN 12/18     AND / ASPEN Clinical Characteristics (October 2011)  A minimum of two characteristics is recommended for diagnosing either moderate or severe malnutrition   Mild Malnutrition Moderate Malnutrition Severe Malnutrition   Energy Intake from p.o., TF or TPN. < 75% intake of estimated energy needs for less than 7 days < 75% intake of estimated energy needs for greater than 7 days < 50% " intake of estimated energy needs for > 5 days   Weight Loss 1-2% in 1 month  5% in 3 months  7.5% in 6 months  10% in 1 year 1-2 % in 1 week  5% in 1 month  7.5% in 3 months  10% in 6 months  20% in 1 year > 2% in 1 week  > 5% in 1 month  > 7.5% in 3 months  > 10% in 6 months  > 20% in 1 year   Physical Findings     None *Mild subcutaneous fat and/or muscle loss  *Mild fluid accumulation  *Stage II decubitus  *Surgical wound or non-healing wound *Mod/severe subcutaneous fat and/or muscle loss  *Mod/severe fluid accumulation  *Stage III or IV decubitus  *Non-healing surgical wound     Provider, please specify diagnosis or diagnoses associated with above clinical findings.    [ ] Mild Protein-Calorie Malnutrition  [x ] Moderate Protein-Calorie Malnutrition  [ ] Severe Protein-Calorie Malnutrition  [ ] Cachexia  [ ] Anorexia  [ ] Abnormal Weight Loss  [ ] Underweight  [ ] Other Nutritional Diagnosis (please specify): ____________________________________  [ ] Other: ________________________________  [ ] Clinically Undetermined    Please document in your progress notes daily for the duration of treatment until resolved and include in your discharge summary.

## 2017-12-19 NOTE — PLAN OF CARE
Problem: Patient Care Overview  Goal: Plan of Care Review  Outcome: Ongoing (interventions implemented as appropriate)  PT IS PENDING D/C TODAY- PT CONTINUE ON A HEPARIN DRIP.  PT REMAINED FREE FROM INJURY AND FURTHER SKIN BREAKDOWN. NO ISSUES W/ LVAD TODAY.  H&H STABLE- PLAN OF CARE UPDATED, ADDRESS ALL ISSUES THROUGHOUT SHIFT..

## 2017-12-19 NOTE — PLAN OF CARE
Problem: Patient Care Overview  Goal: Plan of Care Review    Recommendations:  1. Pt would benefit from an appetite stimulant while admitted and after discharge.  2. Continue current Boost Plus ONS TID.   -Boost contains 32 mcg vit K per 8 oz serving and Nepro contains 20 mcg vit K.  3. Intake has been varied during admit ranging from % of meals. Pt might benefit from a calorie count if not discharged in next 3-4 days.

## 2017-12-19 NOTE — NURSING
Resume care from previous nurse, pt voice no complaints, lying in bed w/ bed in lowest position and locked. Call bell within reach, introduce myself to pt. Will continue to monitor pt status. Spouse is at bedside

## 2017-12-19 NOTE — NURSING
PT's WIFE  WAS GIVEN A COPY OF HOME CARE DISCHARGE INFORMATION- AVS CONFIRMED IN EPIC, PT/ PT'S WIFE  WAS GIVEN A COPY OF HOME CARE MEDICATION SHEET WITH NEXT DOSE WRITTEN ON SHEET. IV AND TELE REMOVED. PT AND PT 'S WIFE VERBALIZE COMPLETE UNDERSTANDING OF HOME CARE DISCHARGE INSTRUCTIONS AND IMPORTANCE OF  FOLLOW UP CARE. BOTH ARE AWARE TO  PRESCRIPTIONS FROM [facility].  BOTH LEFT W/ ALL OF THEIR BELONGINGS- LVAD EQUIPMENT INVENTORY- SEE VAD LIST FOR DETAILS.

## 2017-12-19 NOTE — ASSESSMENT & PLAN NOTE
-s/p total of 5 units PRBCs. H and H now stable  -GI following. EGD 12/8/17 showed Normal esophagus; Gastritis and duodenitis; No gastrointestinal bleeding seen.    -VCE done 12/12/17 showed ulcers.   -Enteroscopy done- showed single (solitary) ulcer in the proximal ileum which was biopsied. Stricture in the proximal ileum. Presence of ileal foreign body- Removal was successful.   -Bx of ulcer- findings constistent with NSAID induced ulcer. GI also requested vial stains  -CT enterography done- per GI, no further procedures. Plan for f/u in GI clinic  -Hg stable  -Continue with PPI and will hold off on iron as pt states he did not tolerate it. Also tried IV Iron but pt did not tolerate

## 2017-12-19 NOTE — PROGRESS NOTES
[facility]  Heart Transplant  Progress Note    Patient Name: Suman Hayden  MRN: [id number]  Admission Date: 12/6/2017  Hospital Length of Stay: 13 days  Attending Physician: Angie Torres MD  Primary Care Provider: Joe Ernst MD  Principal Problem:LVAD (left ventricular assist device) present    Subjective:     Interval History: Had a very large BM yesterday and feels a lot of relief. Able to eat more now.     Continuous Infusions:   heparin 72127 units/250 mL dextrose 5% mixture (NO NOMOGRAM) 16 Units/kg/hr (12/18/17 2352)     Scheduled Meds:   amLODIPine  10 mg Oral Daily    furosemide  40 mg Oral BID    hydrALAZINE  50 mg Oral Q8H    magnesium oxide  400 mg Oral BID    pantoprazole  40 mg Oral BID AC    polyethylene glycol  17 g Oral Daily    potassium chloride  20 mEq Oral BID    pravastatin  20 mg Oral QHS     PRN Meds:sodium chloride, sodium chloride, ALPRAZolam, ondansetron, sodium chloride 0.9%, sodium chloride 0.9%    Review of patient's allergies indicates:  No Known Allergies  Objective:     Vital Signs (Most Recent):  Temp: 97.7 °F (36.5 °C) (12/19/17 0805)  Pulse: 79 (12/19/17 1100)  Resp: 18 (12/19/17 0805)  BP: (!) 80/0 (12/19/17 0900)  SpO2: 96 % (12/19/17 0805) Vital Signs (24h Range):  Temp:  [97.7 °F (36.5 °C)-98.9 °F (37.2 °C)] 97.7 °F (36.5 °C)  Pulse:  [61-86] 79  Resp:  [16-20] 18  SpO2:  [96 %-99 %] 96 %  BP: (70-98)/(0-69) 80/0     Patient Vitals for the past 72 hrs (Last 3 readings):   Weight   12/18/17 0800 59.7 kg (131 lb 9.8 oz)     Body mass index is 20.01 kg/m².      Intake/Output Summary (Last 24 hours) at 12/19/17 1142  Last data filed at 12/18/17 2200   Gross per 24 hour   Intake            653.1 ml   Output             1600 ml   Net           -946.9 ml       Hemodynamic Parameters:       Telemetry: no events    Physical Exam   Constitutional: He is oriented to person, place, and time. No distress.   HENT:   Head: Normocephalic and atraumatic.    Eyes: No scleral icterus.   Neck: Normal range of motion.   Cardiovascular: Normal rate.    LVAD hum present   Pulmonary/Chest: Effort normal and breath sounds normal.   Abdominal: Soft. Bowel sounds are normal. He exhibits no distension and no mass. There is no tenderness. There is no rebound and no guarding. No hernia.   Musculoskeletal: Normal range of motion. He exhibits no edema.   Neurological: He is alert and oriented to person, place, and time.   Skin: He is not diaphoretic.       Significant Labs:  CBC:    Recent Labs  Lab 12/17/17 0449 12/18/17  0543 12/19/17  0358   WBC 5.76 4.98 5.17   RBC 2.74* 2.93* 2.84*   HGB 7.5* 7.9* 7.6*   HCT 24.0* 26.0* 24.4*    215 217   MCV 88 89 86   MCH 27.4 27.0 26.8*   MCHC 31.3* 30.4* 31.1*     BNP:    Recent Labs  Lab 12/13/17  0537 12/15/17  0450 12/18/17  0543   * 386* 386*     CMP:    Recent Labs  Lab 12/13/17  0537  12/15/17  0450  12/17/17 0449 12/18/17  0543 12/19/17  0358   GLU 76  < > 72  < > 83 72 73   CALCIUM 8.7  < > 8.7  < > 8.5* 8.7 8.8   ALBUMIN 2.6*  --  2.7*  --   --  2.8*  --    PROT 5.5*  --  5.7*  --   --  5.9*  --      < > 142  < > 136 136 135*   K 3.4*  < > 3.6  < > 4.2 4.2 4.2   CO2 29  < > 26  < > 26 28 25     < > 108  < > 102 101 102   BUN 15  < > 16  < > 14 13 13   CREATININE 1.0  < > 1.2  < > 1.1 1.0 0.9   ALKPHOS 45*  --  48*  --   --  46*  --    ALT 9*  --  7*  --   --  7*  --    AST 18  --  15  --   --  14  --    BILITOT 0.7  --  0.7  --   --  0.5  --    < > = values in this interval not displayed.   Coagulation:     Recent Labs  Lab 12/17/17 0449 12/18/17  0543 12/19/17  0358 12/19/17  1017   INR 1.6* 1.8* 1.9* 2.0*   APTT 39.0* 67.7* 68.4*  --      LDH:    Recent Labs  Lab 12/17/17 0449 12/18/17  0543 12/19/17  0358    177 175     Microbiology:  Microbiology Results (last 7 days)     ** No results found for the last 168 hours. **          I have reviewed all pertinent labs within the past 24  hours.    Estimated Creatinine Clearance: 67.3 mL/min (based on SCr of 0.9 mg/dL).    Diagnostic Results:  I have reviewed all pertinent imaging results/findings within the past 24 hours.    Assessment and Plan:     68 y/o male with a PMHx of NICM (EF 10%) s/p HeartMate 3 Implanted 7/27/2017 as DT, HTN, DLD and recent ICD revision on 11/20/17 with Dr. Vidal (DC ICD placed with active RA/LV leads. RV lead capped during revision) that was complicated by pocket hematoma. He was discharged on 11/22/17 with plan to follow up in the [place] EP clinic in a week but had not had follow up yet given the recent thanksgiving holiday, admitted as a transfer after presenting to [facility] with ICD shocks.  His ICD was reprogrammed in [facility] as the shocks for VT were inappropriate , rhythm was in fact Atrial Tachycardia. His INR was subtherapeutic and he was bridged with heparin. His hematoma was noted to be improving. He now presents with Hg 5.7 with suspected bleeding, found to have ileal ulcer currently awaiting path results an concomitant GI recs.    * LVAD (left ventricular assist device) present    -HeartMate 3 Implanted 7/27/17   -Speed set at 5200 rpm  -INR subtherapeutic, 1.9. Cont coumadin. Cont Heparin drip.  Repeat INR today. Plan to d/c home if therapeutic  -LDH stable, 100s. Holding ASA in setting of GIB and ulcer found on VCE  - DT, Not listed d/t Hep B          Normocytic anemia    -s/p total of 5 units PRBCs. H and H now stable  -GI following. EGD 12/8/17 showed Normal esophagus; Gastritis and duodenitis; No gastrointestinal bleeding seen.    -VCE done 12/12/17 showed ulcers.   -Enteroscopy done- showed single (solitary) ulcer in the proximal ileum which was biopsied. Stricture in the proximal ileum. Presence of ileal foreign body- Removal was successful.   -Bx of ulcer- findings constistent with NSAID induced ulcer. GI also requested vial stains  -CT enterography done- per GI, no further  procedures. Plan for f/u in GI clinic  -Hg stable  -Continue with PPI and will hold off on iron as pt states he did not tolerate it. Also tried IV Iron but pt did not tolerate          Atrial tachycardia    Continue to monitor        Essential hypertension    -restarted home regimen- amlodipine and hydralazine; will monitor response          ADDENDUM: Repeat INR 2.0. Will d/c home today with INR and CBC Thurs 12/21/17 with Home Health. He will now be off ASA    Susannah Elizabeth PA-C  Heart Transplant  [facility]

## 2017-12-19 NOTE — PLAN OF CARE
Problem: Patient Care Overview  Goal: Plan of Care Review  Outcome: Ongoing (interventions implemented as appropriate)  Patient remained free from falls/injury throughout shift. No complaints of chest pain, SOB, or discomfort. VSS. Doppler numbers WDL. LVAD numbers WDL. Heparin gtt infusing at 9.7 ml/hr. Goal INR is 2-3. Anti-Xa therapeutic at 0.35. Goal Anti-Xa is 0.3-0.5. Current INR 1.8. LVAD dressing change completed by wife. Patient did experience a large bowel movement and verbalized great relief. Plan of care reviewed with patient and spouse. Both verbalized understanding. Will continue to monitor.

## 2017-12-19 NOTE — PROGRESS NOTES
Mr. Hayden will be discharged today after admission for anemia.  PMH includes HM3 LVAD 7/27/17.  Enteroscopy was performed, revealing an ulcer in the proximal ileum - awaiting results of biopsy.  PPI was increased to BID, and ASA was discontinued.  INR goal remains 2-3 requiring a bridge.  Discharge warfarin dose remains 3mg TuThSa, and 2mg all other days.  Recommend repeat INR on Thursday.  Medication card updated and reviewed with the patient.       Suman Hayden   Home Medication Instructions MYRANDA:[id number]    Printed on:12/19/17 0298   Medication Information                      amLODIPine (NORVASC) 10 MG tablet  Take 1 tablet (10 mg total) by mouth once daily.             dronabinol (MARINOL) 5 MG capsule  Take 1 capsule (5 mg total) by mouth 3 (three) times daily before meals.             furosemide (LASIX) 40 MG tablet  Take 1 tablet (40 mg total) by mouth 2 (two) times daily.             hydrALAZINE (APRESOLINE) 50 MG tablet  Take 1 tablet (50 mg total) by mouth every 8 (eight) hours.             magnesium oxide (MAG-OX) 400 mg tablet  Take 1 tablet (400 mg total) by mouth 2 (two) times daily.             pantoprazole (PROTONIX) 40 MG tablet  Take 1 tablet (40 mg total) by mouth 2 (two) times daily before meals.             polyethylene glycol (GLYCOLAX) 17 gram/dose powder  Take 17 g by mouth daily as needed.             potassium chloride SA (K-DUR,KLOR-CON) 20 MEQ tablet  Take 2 tablets (40 mEq total) by mouth once daily.             pravastatin (PRAVACHOL) 20 MG tablet  Take 1 tablet (20 mg total) by mouth every evening.             warfarin (COUMADIN) 2 MG tablet  Take 3mg orally on Tuesday, Thursday and Saturday and 2mg orally on other days as directed by coumadin clinic

## 2017-12-19 NOTE — PLAN OF CARE
[facility]   Heart Transplant/VAD Clinic   [address] [place] [address]   [phone] after hours          [phone] fax     VAD HOME  HEALTH ORDERS      Admit to Home Health    Diagnosis:   Patient Active Problem List   Diagnosis    Nonischemic cardiomyopathy    Encounter for monitoring amiodarone therapy    Essential hypertension    Hyperlipidemia    V-tach    Elevated PSA    Hepatitis B core antibody positive since 2012    Chronic systolic congestive heart failure    Heart transplant candidate    Hyperbilirubinemia    Malfunction of implantable cardioverter-defibrillator (ICD) electrode    Syncope and collapse    Atrial tachycardia    Hyperglycemia    AICD (automatic cardioverter/defibrillator) present    LVAD (left ventricular assist device) present    Atrial fibrillation    Heart replaced by heart assist device    Anticoagulation monitoring, INR range 2-3    Defibrillator discharge    ICD (implantable cardioverter-defibrillator) pocket hematoma    Subtherapeutic international normalized ratio (INR)    Constipation    Normocytic anemia    Anemia       Patient is homebound due to:  LVAD, Chronic Systolic HF    Diet: Low Fat, Low cholesterol, 2Gm Na, Coumadin restrictions.    Acitivities: No Swimming, bathing, vacuuming, contact sports.      Nursing:   SN to complete comprehensive assessment including routine vital signs. Instruct on disease process and s/s of complications to report to MD. Review/verify medication list sent home with the patient at time of discharge  and instruct patient/caregiver as needed. Frequency may be adjusted depending on start of care date.    **LVAD driveline exit site dressing change is to be completed per LVAD patient/caregiver only**.    Notify MD if SBP > 160 or < 90; DBP > 90 or < 50; HR > 120 or < 50; Temp > 101; Weight gain >3lbs in 1 day or 5lbs in 1 week.        LABS:  SN to perform labs:  PT/INR per Coumadin clinic [phone].   Follow up INR date: Thurs 12/21/17   No Finger Sticks    LABS: CBC, BMP, BNP, Mg Thurs 12/21/17. Send results to fax below    CONSULTS:     Physical Therapy to evaluate and treat. Evaluate for home safety and equipment needs; Establish/upgrade home exercise program. Perform / instruct on therapeutic exercises, gait training, transfer training, and Range of Motion.    Occupational Therapy to evaluate and treat. Evaluate home environment for safety and equipment needs. Perform/Instruct on transfers, ADL training, ROM, and therapeutic exercises.                                                        Send initial Home Health orders to [facility] attending physician on call.  Send follow up questions to VAD clinic MD [phone] or [phone].

## 2017-12-19 NOTE — SUBJECTIVE & OBJECTIVE
Interval History: Had a very large BM yesterday and feels a lot of relief. Able to eat more now.     Continuous Infusions:   heparin 96899 units/250 mL dextrose 5% mixture (NO NOMOGRAM) 16 Units/kg/hr (12/18/17 2352)     Scheduled Meds:   amLODIPine  10 mg Oral Daily    furosemide  40 mg Oral BID    hydrALAZINE  50 mg Oral Q8H    magnesium oxide  400 mg Oral BID    pantoprazole  40 mg Oral BID AC    polyethylene glycol  17 g Oral Daily    potassium chloride  20 mEq Oral BID    pravastatin  20 mg Oral QHS     PRN Meds:sodium chloride, sodium chloride, ALPRAZolam, ondansetron, sodium chloride 0.9%, sodium chloride 0.9%    Review of patient's allergies indicates:  No Known Allergies  Objective:     Vital Signs (Most Recent):  Temp: 97.7 °F (36.5 °C) (12/19/17 0805)  Pulse: 79 (12/19/17 1100)  Resp: 18 (12/19/17 0805)  BP: (!) 80/0 (12/19/17 0900)  SpO2: 96 % (12/19/17 0805) Vital Signs (24h Range):  Temp:  [97.7 °F (36.5 °C)-98.9 °F (37.2 °C)] 97.7 °F (36.5 °C)  Pulse:  [61-86] 79  Resp:  [16-20] 18  SpO2:  [96 %-99 %] 96 %  BP: (70-98)/(0-69) 80/0     Patient Vitals for the past 72 hrs (Last 3 readings):   Weight   12/18/17 0800 59.7 kg (131 lb 9.8 oz)     Body mass index is 20.01 kg/m².      Intake/Output Summary (Last 24 hours) at 12/19/17 1142  Last data filed at 12/18/17 2200   Gross per 24 hour   Intake            653.1 ml   Output             1600 ml   Net           -946.9 ml       Hemodynamic Parameters:       Telemetry: no events    Physical Exam   Constitutional: He is oriented to person, place, and time. No distress.   HENT:   Head: Normocephalic and atraumatic.   Eyes: No scleral icterus.   Neck: Normal range of motion.   Cardiovascular: Normal rate.    LVAD hum present   Pulmonary/Chest: Effort normal and breath sounds normal.   Abdominal: Soft. Bowel sounds are normal. He exhibits no distension and no mass. There is no tenderness. There is no rebound and no guarding. No hernia.   Musculoskeletal:  Normal range of motion. He exhibits no edema.   Neurological: He is alert and oriented to person, place, and time.   Skin: He is not diaphoretic.       Significant Labs:  CBC:    Recent Labs  Lab 12/17/17 0449 12/18/17 0543 12/19/17 0358   WBC 5.76 4.98 5.17   RBC 2.74* 2.93* 2.84*   HGB 7.5* 7.9* 7.6*   HCT 24.0* 26.0* 24.4*    215 217   MCV 88 89 86   MCH 27.4 27.0 26.8*   MCHC 31.3* 30.4* 31.1*     BNP:    Recent Labs  Lab 12/13/17  0537 12/15/17  0450 12/18/17  0543   * 386* 386*     CMP:    Recent Labs  Lab 12/13/17  0537  12/15/17  0450  12/17/17  0449 12/18/17  0543 12/19/17  0358   GLU 76  < > 72  < > 83 72 73   CALCIUM 8.7  < > 8.7  < > 8.5* 8.7 8.8   ALBUMIN 2.6*  --  2.7*  --   --  2.8*  --    PROT 5.5*  --  5.7*  --   --  5.9*  --      < > 142  < > 136 136 135*   K 3.4*  < > 3.6  < > 4.2 4.2 4.2   CO2 29  < > 26  < > 26 28 25     < > 108  < > 102 101 102   BUN 15  < > 16  < > 14 13 13   CREATININE 1.0  < > 1.2  < > 1.1 1.0 0.9   ALKPHOS 45*  --  48*  --   --  46*  --    ALT 9*  --  7*  --   --  7*  --    AST 18  --  15  --   --  14  --    BILITOT 0.7  --  0.7  --   --  0.5  --    < > = values in this interval not displayed.   Coagulation:     Recent Labs  Lab 12/17/17 0449 12/18/17 0543 12/19/17 0358 12/19/17  1017   INR 1.6* 1.8* 1.9* 2.0*   APTT 39.0* 67.7* 68.4*  --      LDH:    Recent Labs  Lab 12/17/17  0449 12/18/17  0543 12/19/17  0358    177 175     Microbiology:  Microbiology Results (last 7 days)     ** No results found for the last 168 hours. **          I have reviewed all pertinent labs within the past 24 hours.    Estimated Creatinine Clearance: 67.3 mL/min (based on SCr of 0.9 mg/dL).    Diagnostic Results:  I have reviewed all pertinent imaging results/findings within the past 24 hours.

## 2017-12-19 NOTE — ASSESSMENT & PLAN NOTE
-HeartMate 3 Implanted 7/27/17   -Speed set at 5200 rpm  -INR subtherapeutic, 1.9. Cont coumadin. Cont Heparin drip.  Repeat INR today. Plan to d/c home if therapeutic  -LDH stable, 100s. Holding ASA in setting of GIB and ulcer found on VCE. Will not d/c on ASA  - DT, Not listed d/t Hep B

## 2017-12-20 NOTE — PROGRESS NOTES
Per note: Mr. Hayden will be discharged today after admission for anemia.  PMH includes HM3 LVAD 7/27/17.  Enteroscopy was performed, revealing an ulcer in the proximal ileum - awaiting results of biopsy.  PPI was increased to BID, and ASA was discontinued.  INR goal remains 2-3 requiring a bridge.  Discharge warfarin dose remains 3mg TuThSa, and 2mg all other days.  Recommend repeat INR on Thursday.  Medication card updated and reviewed with the patient.  Per Jane, INR 12/21. Wife confirms dose.

## 2017-12-22 NOTE — TELEPHONE ENCOUNTER
Called 1600 for Lab values drawn today. Home Health RN to call and check out why they have no resulted.

## 2017-12-22 NOTE — TELEPHONE ENCOUNTER
----- Message from Ashley Kiran RN sent at 12/19/2017  4:59 PM CST -----  Regarding: follow up labs  Checking cbc, bnp and bmp with home health

## 2017-12-22 NOTE — PROGRESS NOTES
Hospital discharge -12/19, stopped 81mg asa while in the hospital, not sure if coumadin was held in the hospital but was given heparin drip while in the hospital, eats dill pickles periodically, had GI procedure while in the hospital, drinks up to 4 ensures daily, verified correct dose of coumadin.

## 2017-12-22 NOTE — HOSPITAL COURSE
Pt was admitted for possible GI bleed. ASA was held and pt was transfused PRBCs. GI was consulted- EGD 12/8/17 showed Normal esophagus; Gastritis and duodenitis; No gastrointestinal bleeding seen.  VCE done 12/12/17 showed ileal ulcers.  Enteroscopy done 12/14/17 showed single (solitary) ulcer in the proximal ileum which was biopsied and stricture in the proximal ileum (due to the ulcer). Bx of ulcer showed findings constistent with NSAID induced ulcer. GI also requested viral stains which are still pending at d/c. Ulcer is likely source of GIB. Will keep off ASA. Pt is s/p 5 units total during hospitalization.  Plan for f/u in GI clinic. Pt did not tolerate PO Iron at home and tried IV Iron during hospitalization as well but pt did not tolerate this either. D/C'ed pt home with with INR and CBC in 2 days with Home Health. He will now be off ASA and will be on PPI BID.

## 2017-12-26 NOTE — PHYSICIAN QUERY
PT Name: Suman Hayden  MR #: [id number]    Physician Query Form - Relationship to Procedure Clarification     CDS/: Carly Wade RN  CCDS               Contact information: [email or web address]     This form is a permanent document in the medical record.     Query Date: December 26, 2017      By submitting this query, we are merely seeking further clarification of documentation. Please utilize your independent clinical judgment when addressing the question(s) below.    The Medical record contains the following:  Supporting Clinical Findings Location in Medical Record    68 yo male with a PMHx of NICM (EF 10%) s/p HeartMate 3 Implanted 7/27/2017 as DT, EP consulted for ICD shocks.   Recent ICD revision (for high pacing threshold and poor sensing) 11/20/17 with Dr. Vidal (DC ICD placed with active RA/LV leads. RV lead capped during revision) that was complicated by pocket hematoma.      Review of EGMs are c/w an EAT with AEGrams falling in the blinding period of the V sense circuit and therefore misinterpreted for a VT. The ICD was reprogrammed to a VF single Rxc zone with a monitoring zone for tachs under 200 bpm. The EAT had a rate of 150 bpm and should be ASxc in view of the fact that he has an LVAD. If recurrences become Sxc or affect LVAD function then an RFA would be indicated. If the site of origin is in the RA, then the procedural risk would be minimal. If it is on the L then the risk of air embolization via a trans-septal sheath is more in the presence of an LVAD. For now BBs are enough.      ICD was reprogrammed in [place] as the shocks for VT were inappropriate as the rhythm was AT. EP was consulted on admiission but no changes were made given the ICD had already been reprogrammed in BR   PRINCIPAL PROBLEM: Defibrillator discharge   H&P                 Cardiology consult 11/29                          Discharge Summary       Please clarify if the defibrillator discharge is    [ x ]  Inherent/Integral to the ICD (implantable cardioverter-defibrillator) device  [  ] Routine outcome  [  ] Incidental finding  [  ] Mechanical complication of the ICD device  [  ] Clinically undetermined

## 2017-12-27 PROBLEM — K63.3 ILEUM ULCER: Status: ACTIVE | Noted: 2017-01-01

## 2017-12-27 NOTE — PROGRESS NOTES
INR subtherapeutic. Wife agrees to come in for heparin admission. Advised 3mg today. Liz to call in admission, Dr Leos aware.

## 2017-12-27 NOTE — PROGRESS NOTES
Per carie from [facility] reports no labs were drawn 12/26. Will have labs drawn today 12/27

## 2017-12-28 NOTE — HPI
68 y/o male with a PMHx of NICM (EF 10%) s/p HeartMate 3 Implanted 7/27/2017 as DT, HTN, DLD, recent ICD revision on 11/20/17 with Dr. Vidal (DC ICD placed with active RA/LV leads (RV lead capped during revision) that was complicated by pocket hematoma and recent GIB bleed due to ileal (NSAID induced) ulcer requiring PRBC transfusion and EGD for which he was recently discharged on 12/22/17. ASA stopped at time and PPI increased to BID at time of discharge.     He presents today with subtherapeutic INR of 1.4. Otherwise doing well with no recent flair in CHF symptoms and significantly improved melena (BMs almost back to normal). Still not tolerating any oral iron supplementation despite multiple tries.   68 y/o male with a PMHx of NICM (EF 10%) s/p HeartMate 3 Implanted 7/27/2017 as DT, HTN, DLD, recent ICD revision on 11/20/17 with Dr. Vidal (DC ICD placed with active RA/LV leads (RV lead capped during revision) that was complicated by pocket hematoma and recent GIB bleed due to ileal (NSAID induced) ulcer requiring PRBC transfusion and EGD for which he was recently discharged on 12/22/17. ASA stopped at time and PPI increased to BID at time of discharge.      He presents today with subtherapeutic INR of 1.4. Otherwise doing well with no recent flair in CHF symptoms and significantly improved melena (BMs almost back to normal). Still not tolerating any oral iron supplementation despite multiple tries.

## 2017-12-28 NOTE — ASSESSMENT & PLAN NOTE
INR goal 2-3   INR 1.4 this AM   Heparin bridge  Follow CBC given recent GI bleed (NSAID induced ulcer)

## 2017-12-28 NOTE — ASSESSMENT & PLAN NOTE
INR goal 2-3   INR 1.4 this AM  Took 3mg coumadin tonight   Daily INR   Heparin bridge without bolus   Follow CBC given recent GI bleed (NSAID induced ulcer)   Re-evaluate above plan if Hgb drops or melena worsens

## 2017-12-28 NOTE — PLAN OF CARE
Problem: Patient Care Overview  Goal: Plan of Care Review  Outcome: Ongoing (interventions implemented as appropriate)  Patient remained free from falls/injury/trauma throughout shift. No complaints of chest pain, SOB, or discomfort. VSS. Doppler numbers WDL. LVAD numbers WDL. Heparin gtt initiated and infusing at 17 units/kg at 11.8 ml/hr. Current INR 1.4. Goal is 2-3. Anti-Xa 0.10. Plan of care reviewed with patient. Patient verbalized understanding. Will continue to monitor.

## 2017-12-28 NOTE — ASSESSMENT & PLAN NOTE
"Per report "NSAID induced ulcer"  Melena from previous admission still present but improving   Follow CBC and adjust AC plans if HGB drops or melena worsens   Continue BID PPI   Patient not tolerant of oral / IV iron per records and is not willing to retry as of now   No role for further GI involvement at this point. Recent EGD note reviewed.   "

## 2017-12-28 NOTE — ASSESSMENT & PLAN NOTE
"Per report "NSAID induced ulcer"  Melena from previous admission still present but improving   Continue BID PPI   Patient not tolerant of oral / IV iron per records and is not willing to retry as of now   No role for further GI involvement at this point. Recent EGD note reviewed.   "

## 2017-12-28 NOTE — H&P
[facility]  Heart Transplant  H&P    Patient Name: Suman Hayden  MRN: [id number]  Admission Date: 12/27/2017  Attending Physician: Ortega Leos MD  Primary Care Provider: Joe Ernst MD  Principal Problem:Subtherapeutic international normalized ratio (INR)    Subjective:     History of Present Illness:  68 y/o male with a PMHx of NICM (EF 10%) s/p HeartMate 3 Implanted 7/27/2017 as DT, HTN, DLD, recent ICD revision on 11/20/17 with Dr. Vidal (DC ICD placed with active RA/LV leads (RV lead capped during revision) that was complicated by pocket hematoma and recent GIB bleed due to ileal (NSAID induced) ulcer requiring PRBC transfusion and EGD for which he was recently discharged on 12/22/17. ASA stopped at time and PPI increased to BID at time of discharge.     He presents today with subtherapeutic INR of 1.4. Otherwise doing well with no recent flair in CHF symptoms and significantly improved melena (BMs almost back to normal). Still not tolerating any oral iron supplementation despite multiple tries.       Past Medical History:   Diagnosis Date    Cardiomyopathy     Hyperlipidemia     Hypertension     Obesity     S/P implantation of automatic cardioverter/defibrillator (AICD)     Ventricular tachycardia      Past Surgical History:   Procedure Laterality Date    CARDIAC CATHETERIZATION      CARDIAC DEFIBRILLATOR PLACEMENT      LEFT VENTRICULAR ASSIST DEVICE  07/27/2017     Review of patient's allergies indicates:  No Known Allergies    Current Facility-Administered Medications   Medication    [START ON 12/28/2017] amLODIPine tablet 10 mg    [START ON 12/28/2017] dronabinol capsule 5 mg    [START ON 12/28/2017] furosemide tablet 40 mg    heparin 25,000 units in dextrose 5% 250 mL (100 units/mL) infusion    [START ON 12/28/2017] hydrALAZINE tablet 50 mg    magnesium oxide tablet 400 mg    [START ON 12/28/2017] pantoprazole EC tablet 40 mg    polyethylene glycol packet  17 g    [START ON 12/28/2017] potassium chloride SA CR tablet 40 mEq    pravastatin tablet 20 mg    warfarin (COUMADIN) tablet 3 mg     Family History     Problem Relation (Age of Onset)    Heart disease Mother, Father        Social History Main Topics    Smoking status: Never Smoker    Smokeless tobacco: Never Used    Alcohol use No    Drug use: Unknown    Sexual activity: Not on file     Review of Systems   Constitutional: Negative.    HENT: Negative.    Respiratory: Negative for chest tightness, shortness of breath and wheezing.    Cardiovascular: Negative for chest pain, palpitations and leg swelling.   Gastrointestinal: Positive for constipation. Negative for abdominal distention, diarrhea, nausea and vomiting.        Improving melena (from recent GI bleed)    Endocrine: Negative.    Genitourinary: Negative.    Musculoskeletal: Negative for arthralgias, gait problem and joint swelling.   Skin: Negative.    Neurological: Negative for dizziness, seizures, syncope and weakness.   Hematological: Negative.    Psychiatric/Behavioral: Negative.      Objective:     Vital Signs (Most Recent):  Temp: 98.3 °F (36.8 °C) (12/27/17 2143)  Pulse: 79 (12/27/17 2143)  Resp: 16 (12/27/17 2143)  BP: (!) 70/0 (12/27/17 2150)  SpO2: 95 % (12/27/17 2143) Vital Signs (24h Range):  Temp:  [98.3 °F (36.8 °C)] 98.3 °F (36.8 °C)  Pulse:  [79] 79  Resp:  [16] 16  SpO2:  [95 %] 95 %  BP: (70-90)/(0-62) 70/0   Patient Vitals for the past 72 hrs (Last 3 readings):   Weight   12/27/17 2150 69.4 kg (152 lb 14.4 oz)     Body mass index is 22.58 kg/m².    No intake or output data in the 24 hours ending 12/27/17 2159    Physical Exam   Constitutional: He is oriented to person, place, and time. He appears well-developed and well-nourished.   HENT:   Head: Normocephalic and atraumatic.   Eyes: Pupils are equal, round, and reactive to light. No scleral icterus.   Neck: Neck supple. No JVD present.   Cardiovascular: Normal rate.    + Smooth  "LVAD Hum   Pulmonary/Chest: Effort normal and breath sounds normal. He has no wheezes. He has no rales.   Abdominal: Soft. Bowel sounds are normal. He exhibits no distension.   Musculoskeletal: He exhibits no edema or tenderness.   Neurological: He is alert and oriented to person, place, and time.   Skin: Skin is warm and dry. Capillary refill takes 2 to 3 seconds.   Psychiatric: He has a normal mood and affect. His behavior is normal. Judgment and thought content normal.   Vitals reviewed.      Significant Labs:  CBC:    Recent Labs  Lab 12/22/17 12/27/17  2140   WBC 5.7 5.83   RBC  --  2.76*   HGB 8.8* 7.5*   HCT 28* 23.3*   PLT  --  231   MCV  --  84   MCH  --  27.2   MCHC  --  32.2     CMP: Pending     Coagulation:     Recent Labs  Lab 12/21/17 12/27/17   INR 2.0 1.4     LDH: Pending     I have reviewed all pertinent labs within the past 24 hours.    Diagnostic Results:  I have reviewed and interpreted all pertinent imaging results/findings within the past 24 hours.    Assessment/Plan:     * Subtherapeutic international normalized ratio (INR)    INR goal 2-3   INR 1.4 this AM  Took 3mg coumadin tonight   Daily INR   Heparin bridge without bolus   Follow CBC given recent GI bleed (NSAID induced ulcer)   Re-evaluate above plan if Hgb drops or melena worsens         LVAD (left ventricular assist device) present    HM3 implanted 7/2017 as DT  ASA recently stopped due to NSAID induced ulcer and major GI bleed   INR goal of 2-3. INR 1.4 today  Continue boosted coumadin with heparin bridge   Intermittently pulsatile   MAP goal of 60-80  Continue Amlodipine 10mg and hydralazine 50mg   Lasix 40 mg BID (PTA med)         Ileum ulcer    Per report "NSAID induced ulcer"  Melena from previous admission still present but improving   Follow CBC and adjust AC plans if HGB drops or melena worsens   Continue BID PPI   Patient not tolerant of oral / IV iron per records and is not willing to retry as of now   No role for further GI " involvement at this point. Recent EGD note reviewed.             Giovanni serrano, DO  Heart Transplant  [facility]

## 2017-12-28 NOTE — PROGRESS NOTES
[facility]  Heart Transplant  Progress Note    Patient Name: Suman Hayden  MRN: [id number]  Admission Date: 12/27/2017  Hospital Length of Stay: 1 days  Attending Physician: Ortega Leos MD  Primary Care Provider: Joe Ernst MD  Principal Problem:Subtherapeutic international normalized ratio (INR)    Subjective:     Interval History: patient feels well, no issues overnight     Continuous Infusions:   heparin (porcine) in D5W 21 Units/kg/hr (12/28/17 0637)     Scheduled Meds:   amLODIPine  10 mg Oral Daily    dronabinol  5 mg Oral TID AC    furosemide  40 mg Oral BID    hydrALAZINE  50 mg Oral Q8H    magnesium oxide  400 mg Oral BID    pantoprazole  40 mg Oral BID    pravastatin  20 mg Oral QHS    warfarin  4 mg Oral Daily     PRN Meds:polyethylene glycol    Review of patient's allergies indicates:  No Known Allergies  Objective:     Vital Signs (Most Recent):  Temp: 98.3 °F (36.8 °C) (12/28/17 0850)  Pulse: 80 (12/28/17 1459)  Resp: 18 (12/28/17 1225)  BP: (!) 72/0 (12/28/17 1225)  SpO2: 98 % (12/28/17 1225) Vital Signs (24h Range):  Temp:  [98.2 °F (36.8 °C)-98.5 °F (36.9 °C)] 98.3 °F (36.8 °C)  Pulse:  [75-80] 80  Resp:  [16-18] 18  SpO2:  [95 %-99 %] 98 %  BP: (70-91)/(0-64) 72/0     Patient Vitals for the past 72 hrs (Last 3 readings):   Weight   12/28/17 0900 64.6 kg (142 lb 6.7 oz)   12/27/17 2150 69.4 kg (152 lb 14.4 oz)     Body mass index is 21.03 kg/m².      Intake/Output Summary (Last 24 hours) at 12/28/17 1604  Last data filed at 12/28/17 0400   Gross per 24 hour   Intake              240 ml   Output             1125 ml   Net             -885 ml       Hemodynamic Parameters:           Physical Exam   Constitutional: He appears well-developed and well-nourished. No distress.   HENT:   Head: Normocephalic.   Eyes: Pupils are equal, round, and reactive to light.   Neck: Normal range of motion. No JVD present.   Cardiovascular: Normal rate.  Exam reveals no friction  rub.    No murmur heard.  Pulmonary/Chest: Effort normal. No respiratory distress. He has no wheezes.   Abdominal: Soft. He exhibits no distension.   Neurological: He is alert.   Skin: Skin is warm. He is not diaphoretic. No erythema.       Significant Labs:  CBC:    Recent Labs  Lab 12/22/17 12/27/17 2140 12/28/17 0422   WBC 5.7 5.83 5.72   RBC  --  2.76* 2.63*   HGB 8.8* 7.5* 6.9*   HCT 28* 23.3* 22.2*   PLT  --  231 226   MCV  --  84 84   MCH  --  27.2 26.2*   MCHC  --  32.2 31.1*     BNP:  No results for input(s): BNP in the last 168 hours.    Invalid input(s): BNPTRIAGELBLO  CMP:    Recent Labs  Lab 12/22/17 12/27/17 2140 12/28/17  0422   GLU  --  86 73   CALCIUM  --  9.0 8.9    137 137   K 3.7 3.6 3.5   CO2 23 28 30*   CL  --  100 100   BUN 1* 16 15   CREATININE 1.1 0.9 0.8      Coagulation:     Recent Labs  Lab 12/27/17 12/28/17  0423   INR 1.4 1.4*     LDH:    Recent Labs  Lab 12/27/17 2140 12/28/17  0422    147     Microbiology:  Microbiology Results (last 7 days)     ** No results found for the last 168 hours. **          I have reviewed all pertinent labs within the past 24 hours.    Estimated Creatinine Clearance: 81.9 mL/min (based on SCr of 0.8 mg/dL).    Diagnostic Results:  I have reviewed and interpreted all pertinent imaging results/findings within the past 24 hours.    Assessment and Plan:     68 y/o male with a PMHx of NICM (EF 10%) s/p HeartMate 3 Implanted 7/27/2017 as DT, HTN, DLD, recent ICD revision on 11/20/17 with Dr. Vidal (DC ICD placed with active RA/LV leads (RV lead capped during revision) that was complicated by pocket hematoma and recent GIB bleed due to ileal (NSAID induced) ulcer requiring PRBC transfusion and EGD for which he was recently discharged on 12/22/17. ASA stopped at time and PPI increased to BID at time of discharge.     He presents today with subtherapeutic INR of 1.4. Otherwise doing well with no recent flair in CHF symptoms and significantly  "improved melena (BMs almost back to normal). Still not tolerating any oral iron supplementation despite multiple tries.       * Subtherapeutic international normalized ratio (INR)    INR goal 2-3   INR 1.4 this AM   Heparin bridge  Follow CBC given recent GI bleed (NSAID induced ulcer)           Ileum ulcer    Per report "NSAID induced ulcer"  Melena from previous admission still present but improving   Continue BID PPI   Patient not tolerant of oral / IV iron per records and is not willing to retry as of now   No role for further GI involvement at this point. Recent EGD note reviewed.         LVAD (left ventricular assist device) present    HM3 implanted 7/2017 as DT  ASA recently stopped due to NSAID induced ulcer and major GI bleed   INR goal of 2-3. INR 1.4 today  Continue boosted coumadin with heparin bridge   Intermittently pulsatile   MAP goal of 60-80  Continue Amlodipine 10mg and hydralazine 50mg   Lasix 40 mg BID            MELISSA Long  Heart Transplant  [facility]  "

## 2017-12-28 NOTE — SUBJECTIVE & OBJECTIVE
Interval History: patient feels well, no issues overnight     Continuous Infusions:   heparin (porcine) in D5W 21 Units/kg/hr (12/28/17 0637)     Scheduled Meds:   amLODIPine  10 mg Oral Daily    dronabinol  5 mg Oral TID AC    furosemide  40 mg Oral BID    hydrALAZINE  50 mg Oral Q8H    magnesium oxide  400 mg Oral BID    pantoprazole  40 mg Oral BID    pravastatin  20 mg Oral QHS    warfarin  4 mg Oral Daily     PRN Meds:polyethylene glycol    Review of patient's allergies indicates:  No Known Allergies  Objective:     Vital Signs (Most Recent):  Temp: 98.3 °F (36.8 °C) (12/28/17 0850)  Pulse: 80 (12/28/17 1459)  Resp: 18 (12/28/17 1225)  BP: (!) 72/0 (12/28/17 1225)  SpO2: 98 % (12/28/17 1225) Vital Signs (24h Range):  Temp:  [98.2 °F (36.8 °C)-98.5 °F (36.9 °C)] 98.3 °F (36.8 °C)  Pulse:  [75-80] 80  Resp:  [16-18] 18  SpO2:  [95 %-99 %] 98 %  BP: (70-91)/(0-64) 72/0     Patient Vitals for the past 72 hrs (Last 3 readings):   Weight   12/28/17 0900 64.6 kg (142 lb 6.7 oz)   12/27/17 2150 69.4 kg (152 lb 14.4 oz)     Body mass index is 21.03 kg/m².      Intake/Output Summary (Last 24 hours) at 12/28/17 1604  Last data filed at 12/28/17 0400   Gross per 24 hour   Intake              240 ml   Output             1125 ml   Net             -885 ml       Hemodynamic Parameters:           Physical Exam   Constitutional: He appears well-developed and well-nourished. No distress.   HENT:   Head: Normocephalic.   Eyes: Pupils are equal, round, and reactive to light.   Neck: Normal range of motion. No JVD present.   Cardiovascular: Normal rate.  Exam reveals no friction rub.    No murmur heard.  Pulmonary/Chest: Effort normal. No respiratory distress. He has no wheezes.   Abdominal: Soft. He exhibits no distension.   Neurological: He is alert.   Skin: Skin is warm. He is not diaphoretic. No erythema.       Significant Labs:  CBC:    Recent Labs  Lab 12/22/17 12/27/17  2140 12/28/17  0422   WBC 5.7 5.83 5.72   RBC   --  2.76* 2.63*   HGB 8.8* 7.5* 6.9*   HCT 28* 23.3* 22.2*   PLT  --  231 226   MCV  --  84 84   MCH  --  27.2 26.2*   MCHC  --  32.2 31.1*     BNP:  No results for input(s): BNP in the last 168 hours.    Invalid input(s): BNPTRIAGELBLO  CMP:    Recent Labs  Lab 12/22/17 12/27/17 2140 12/28/17  0422   GLU  --  86 73   CALCIUM  --  9.0 8.9    137 137   K 3.7 3.6 3.5   CO2 23 28 30*   CL  --  100 100   BUN 1* 16 15   CREATININE 1.1 0.9 0.8      Coagulation:     Recent Labs  Lab 12/27/17 12/28/17  0423   INR 1.4 1.4*     LDH:    Recent Labs  Lab 12/27/17 2140 12/28/17  0422    147     Microbiology:  Microbiology Results (last 7 days)     ** No results found for the last 168 hours. **          I have reviewed all pertinent labs within the past 24 hours.    Estimated Creatinine Clearance: 81.9 mL/min (based on SCr of 0.8 mg/dL).    Diagnostic Results:  I have reviewed and interpreted all pertinent imaging results/findings within the past 24 hours.

## 2017-12-28 NOTE — PROGRESS NOTES
Admit Note     Met with patient and wife  to assess needs. Patient is a 67 y.o.  male, admitted for low INR, per medical record.  Pt has an LVAD, implanted in 7/2017.    Patient admitted from home on 12/27/2017.  At this time, patient presents as alert and oriented x 4, communicative and asking and answering questions appropriately.  At this time, patients caregiver presents as alert and oriented x 4, communicative and asking and answering questions appropriately.      Household/Family Systems     Patient currently resides with patient's daughter, at:    [address], [address]  [place] [address]    Pt's permanent address is:    [address]  [place] [address]     Pt has been staying with his dgtr recently for additional support.  Per dgtr, pt's wife comes to dgtr's home to check in on pt and to transport him to medical appts.    Home: [phone]  Pt cell:  [phone]  Kia Hayden (wife): [phone]  Rayna (dgtr): [phone]  Boni Hayden (son): [phone]  Artie Hayden (nephew, lives in [place]): [phone]    Support system includes spouse, adult children, extended family, and many friends.  Patient does not have dependents that are need of being cared for.     Patients primary caregiver is Kia Hayden, patients wife, and dgtr Rayna.  During admission, patient's wife plans to stay in patient's room.  Confirmed patient and patients caregivers do have access to reliable transportation.    Cognitive Status/Learning     Patient reports reading ability as 9th grade and states patient does not have difficulty with seeing, hearing, comprehension, learning and memory. Pt does report difficulty with reading and writing. Pt's wife assists with reading and writing.  Patient reports patient learns best by one-on-one.    Needed: No.   Highest education level: High School (9-12) or GED    Vocation/Disability     Working for Income: No  If no, reason not working: Patient  Choice - Retired    Patient is retired from the [employer] in .  Pt was a .  Pt's wife was working as a PCA until 2017, however she now cares for pt.     Adherence     Patient reports a high level of adherence to patients health care regimen.  Adherence counseling and education provided. Patient verbalizes understanding.    Substance Use    Patient reports the following substance usage.    Tobacco: none, patient denies any use.  Alcohol: none, patient denies any use.  Illicit Drugs/Non-prescribed Medications: none, patient denies any use.  Patient states clear understanding of the potential impact of substance use.  Substance abstinence/cessation counseling, education and resources provided and reviewed.     Services Utilizing/ADLS    Infusion Service: Prior to admission, patient utilizing? no, pt has used [facility] in the past  Home Health: Prior to admission, patient utilizing? yes, pt has [facility] (ph: [phone], f: [phone]) for skilled nrsg visits.  Pt requesting to resume with [facility] at d/c.  DME: Prior to admission, yes, [place] and w/c  Pulmonary/Cardiac Rehab: Prior to admission, no  Dialysis:  Prior to admission, no  Transplant Specialty Pharmacy:  Prior to admission, n/a    Prior to admission, patient reports patient was mostly independent with ADLS and was not driving. Pt's wife drives.  Patient reports patient is able to care for self at this time.  Patient indicates a willingness to care for self once medically cleared to do so.    Insurance/Medications    Insured by   Payor/Plan Subscr  Sex Relation Sub. Ins. ID Effective Group Num   1. MEDICARE - ME* MIRTA LOPEZ 1950 Male  [id number] 6/1/15                                    [address]   2. BLUE CROSS * MIRTA LOPEZ 1950 Male  [id number] 1/1/10 [id number]                                   [address] [address]      Primary Insurance (for UNOS reporting): Public Insurance -  Medicare FFS (Fee For Service)  Secondary Insurance (for UNOS reporting): Private Insurance    Patient reports patient is able to obtain and afford medications at this time and at time of discharge.    Living Will/Healthcare Power of     Patient states patient does not have a LW and/or HCPA.   provided education regarding LW and HCPA and the completion of forms.    Coping/Mental Health    Patient is coping well with the aid of  family members.  Patient denies mental health difficulties. Pt and wife have had difficulties with their relationship since pt received his LVAD as it has added stress. Things are better at this time as pt is staying at his daughters with his wife helping out.     Discharge Planning    At time of discharge, patient plans to return to dgtr's home under the care of wife and daughter.  Patients wife will transport patient.  Per rounds today, expected discharge date has not been medically determined at this time.  Patient and patients caregiver verbalize understanding and are involved in treatment planning and discharge process.    Additional Concerns    Patient is being followed for needs, education, resources, information, emotional support, supportive counseling, and for supportive and skilled discharge plan of care.  providing ongoing psychosocial support, education, resources and d/c planning as needed.  SW remains available.

## 2017-12-28 NOTE — ASSESSMENT & PLAN NOTE
HM3 implanted 7/2017 as DT  ASA recently stopped due to NSAID induced ulcer and major GI bleed   INR goal of 2-3. INR 1.4 today  Continue boosted coumadin with heparin bridge   Intermittently pulsatile   MAP goal of 60-80  Continue Amlodipine 10mg and hydralazine 50mg   Lasix 40 mg BID (PTA med)

## 2017-12-28 NOTE — ASSESSMENT & PLAN NOTE
HM3 implanted 7/2017 as DT  ASA recently stopped due to NSAID induced ulcer and major GI bleed   INR goal of 2-3. INR 1.4 today  Continue boosted coumadin with heparin bridge   Intermittently pulsatile   MAP goal of 60-80  Continue Amlodipine 10mg and hydralazine 50mg   Lasix 40 mg BID

## 2017-12-28 NOTE — SUBJECTIVE & OBJECTIVE
Past Medical History:   Diagnosis Date    Cardiomyopathy     Hyperlipidemia     Hypertension     Obesity     S/P implantation of automatic cardioverter/defibrillator (AICD)     Ventricular tachycardia      Past Surgical History:   Procedure Laterality Date    CARDIAC CATHETERIZATION      CARDIAC DEFIBRILLATOR PLACEMENT      LEFT VENTRICULAR ASSIST DEVICE  07/27/2017     Review of patient's allergies indicates:  No Known Allergies    Current Facility-Administered Medications   Medication    [START ON 12/28/2017] amLODIPine tablet 10 mg    [START ON 12/28/2017] dronabinol capsule 5 mg    [START ON 12/28/2017] furosemide tablet 40 mg    heparin 25,000 units in dextrose 5% 250 mL (100 units/mL) infusion    [START ON 12/28/2017] hydrALAZINE tablet 50 mg    magnesium oxide tablet 400 mg    [START ON 12/28/2017] pantoprazole EC tablet 40 mg    polyethylene glycol packet 17 g    [START ON 12/28/2017] potassium chloride SA CR tablet 40 mEq    pravastatin tablet 20 mg    warfarin (COUMADIN) tablet 3 mg     Family History     Problem Relation (Age of Onset)    Heart disease Mother, Father        Social History Main Topics    Smoking status: Never Smoker    Smokeless tobacco: Never Used    Alcohol use No    Drug use: Unknown    Sexual activity: Not on file     Review of Systems   Constitutional: Negative.    HENT: Negative.    Respiratory: Negative for chest tightness, shortness of breath and wheezing.    Cardiovascular: Negative for chest pain, palpitations and leg swelling.   Gastrointestinal: Positive for constipation. Negative for abdominal distention, diarrhea, nausea and vomiting.        Improving melena (from recent GI bleed)    Endocrine: Negative.    Genitourinary: Negative.    Musculoskeletal: Negative for arthralgias, gait problem and joint swelling.   Skin: Negative.    Neurological: Negative for dizziness, seizures, syncope and weakness.   Hematological: Negative.    Psychiatric/Behavioral:  Negative.      Objective:     Vital Signs (Most Recent):  Temp: 98.3 °F (36.8 °C) (12/27/17 2143)  Pulse: 79 (12/27/17 2143)  Resp: 16 (12/27/17 2143)  BP: (!) 70/0 (12/27/17 2150)  SpO2: 95 % (12/27/17 2143) Vital Signs (24h Range):  Temp:  [98.3 °F (36.8 °C)] 98.3 °F (36.8 °C)  Pulse:  [79] 79  Resp:  [16] 16  SpO2:  [95 %] 95 %  BP: (70-90)/(0-62) 70/0   Patient Vitals for the past 72 hrs (Last 3 readings):   Weight   12/27/17 2150 69.4 kg (152 lb 14.4 oz)     Body mass index is 22.58 kg/m².    No intake or output data in the 24 hours ending 12/27/17 2159    Physical Exam   Constitutional: He is oriented to person, place, and time. He appears well-developed and well-nourished.   HENT:   Head: Normocephalic and atraumatic.   Eyes: Pupils are equal, round, and reactive to light. No scleral icterus.   Neck: Neck supple. No JVD present.   Cardiovascular: Normal rate.    + Smooth LVAD Hum   Pulmonary/Chest: Effort normal and breath sounds normal. He has no wheezes. He has no rales.   Abdominal: Soft. Bowel sounds are normal. He exhibits no distension.   Musculoskeletal: He exhibits no edema or tenderness.   Neurological: He is alert and oriented to person, place, and time.   Skin: Skin is warm and dry. Capillary refill takes 2 to 3 seconds.   Psychiatric: He has a normal mood and affect. His behavior is normal. Judgment and thought content normal.   Vitals reviewed.      Significant Labs:  CBC:    Recent Labs  Lab 12/22/17 12/27/17 2140   WBC 5.7 5.83   RBC  --  2.76*   HGB 8.8* 7.5*   HCT 28* 23.3*   PLT  --  231   MCV  --  84   MCH  --  27.2   MCHC  --  32.2     CMP: Pending     Coagulation:     Recent Labs  Lab 12/21/17 12/27/17   INR 2.0 1.4     LDH: Pending     I have reviewed all pertinent labs within the past 24 hours.    Diagnostic Results:  I have reviewed and interpreted all pertinent imaging results/findings within the past 24 hours.

## 2017-12-28 NOTE — PLAN OF CARE
Problem: Patient Care Overview  Goal: Plan of Care Review  Outcome: Ongoing (interventions implemented as appropriate)  Pt free of falls/trauma/injuries this shift. LVAD working properly and sounds smooth.  Dopplers and MAPs WDL. LVAD dressing remains clean, dry, and intact with no drainage noted. Dressing change done today by spouse. Heparin/Coumadin bridge in progress. INR is 1.4 today. Patient tolerating plan of care well. JORGE DAMIAN.

## 2017-12-29 NOTE — PROGRESS NOTES
Mr. Hayden will be discharged today after admission for subtherapeutic INR.  PMH includes HM3 LVAD with recent admission for GIB likely from ulcers.  He remains off ASA, with an INR goal of 2-3, requiring a bridge.  He was discharged with instructions to take a larger dose of warfarin at 4mg on 12/29 only, followed by a weekly dose of 3mg/day, except 2mg on Saturdays.  Recommend close INR f/u on Tuesday. Chart updated.  faxed.

## 2017-12-29 NOTE — ASSESSMENT & PLAN NOTE
INR goal 2-3   INR 1.7 this AM   Will dc home and follow up inr next week  Follow CBC given recent GI bleed (NSAID induced ulcer)

## 2017-12-29 NOTE — DISCHARGE SUMMARY
[facility]  Heart Transplant  Discharge Summary      Patient Name: Suman Hayden  MRN: [id number]  Admission Date: 12/27/2017  Hospital Length of Stay: 2 days  Discharge Date and Time: 12/29/2017 3:31 PM  Attending Physician: No att. providers found   Discharging Provider: MELISSA Long  Primary Care Provider: Joe Ernst MD     HPI: 68 y/o male with a PMHx of NICM (EF 10%) s/p HeartMate 3 Implanted 7/27/2017 as DT, HTN, DLD, recent ICD revision on 11/20/17 with Dr. Vidal (DC ICD placed with active RA/LV leads (RV lead capped during revision) that was complicated by pocket hematoma and recent GIB bleed due to ileal (NSAID induced) ulcer requiring PRBC transfusion and EGD for which he was recently discharged on 12/22/17. ASA stopped at time and PPI increased to BID at time of discharge.     He presents today with subtherapeutic INR of 1.4. Otherwise doing well with no recent flair in CHF symptoms and significantly improved melena (BMs almost back to normal). Still not tolerating any oral iron supplementation despite multiple tries.   68 y/o male with a PMHx of NICM (EF 10%) s/p HeartMate 3 Implanted 7/27/2017 as DT, HTN, DLD, recent ICD revision on 11/20/17 with Dr. Vidal (DC ICD placed with active RA/LV leads (RV lead capped during revision) that was complicated by pocket hematoma and recent GIB bleed due to ileal (NSAID induced) ulcer requiring PRBC transfusion and EGD for which he was recently discharged on 12/22/17. ASA stopped at time and PPI increased to BID at time of discharge.      He presents today with subtherapeutic INR of 1.4. Otherwise doing well with no recent flair in CHF symptoms and significantly improved melena (BMs almost back to normal). Still not tolerating any oral iron supplementation despite multiple tries.     * No surgery found *     Hospital Course: Given a boost of coumadin (4 mg) and next day INR had risen to 1.7 from 1.4. Because the INR was  rising and he has history of GI bleeds, decision was made patient could discharge with INR check next week.         Significant Diagnostic Studies: Labs:   BMP:   Recent Labs  Lab 12/27/17  2140 12/28/17  0422 12/29/17  0626   GLU 86 73 77    137 138   K 3.6 3.5 3.4*    100 102   CO2 28 30* 29   BUN 16 15 12   CREATININE 0.9 0.8 0.8   CALCIUM 9.0 8.9 8.8   MG 1.7 1.9 1.9       Pending Diagnostic Studies:     None        Final Active Diagnoses:    Diagnosis Date Noted POA    PRINCIPAL PROBLEM:  Subtherapeutic international normalized ratio (INR) [R79.1] 11/30/2017 Yes    Ileum ulcer [K63.3] 12/27/2017 Unknown    LVAD (left ventricular assist device) present [Z95.811] 07/29/2017 Not Applicable      Problems Resolved During this Admission:    Diagnosis Date Noted Date Resolved POA      Discharged Condition: good    Disposition: Home or Self Care    Follow Up:    Patient Instructions:   No discharge procedures on file.  Medications:  Reconciled Home Medications:   Discharge Medication List as of 12/29/2017 12:23 PM      CONTINUE these medications which have CHANGED    Details   warfarin (COUMADIN) 2 MG tablet 4mg 12/29 only, followed by weekly dose of 3mg/day, except 2mg on Saturdays, No Print         CONTINUE these medications which have NOT CHANGED    Details   amLODIPine (NORVASC) 10 MG tablet Take 1 tablet (10 mg total) by mouth once daily., Starting Wed 12/6/2017, Until Thu 12/6/2018, Normal      dronabinol (MARINOL) 5 MG capsule Take 1 capsule (5 mg total) by mouth 3 (three) times daily before meals., Starting Wed 12/6/2017, No Print      furosemide (LASIX) 40 MG tablet Take 1 tablet (40 mg total) by mouth 2 (two) times daily., Starting Tue 12/19/2017, Normal      hydrALAZINE (APRESOLINE) 50 MG tablet Take 1 tablet (50 mg total) by mouth every 8 (eight) hours., Starting Thu 10/26/2017, Until Fri 10/26/2018, Normal      magnesium oxide (MAG-OX) 400 mg tablet Take 1 tablet (400 mg total) by mouth 2  (two) times daily., Starting Wed 11/22/2017, OTC      pantoprazole (PROTONIX) 40 MG tablet Take 1 tablet (40 mg total) by mouth 2 (two) times daily before meals., Starting Tue 12/19/2017, Until Wed 12/19/2018, Normal      polyethylene glycol (GLYCOLAX) 17 gram/dose powder Take 17 g by mouth daily as needed., Starting Tue 12/19/2017, No Print      potassium chloride SA (K-DUR,KLOR-CON) 20 MEQ tablet Take 2 tablets (40 mEq total) by mouth once daily., Starting Tue 12/19/2017, No Print      pravastatin (PRAVACHOL) 20 MG tablet Take 1 tablet (20 mg total) by mouth every evening., Starting Thu 10/26/2017, Normal             MELISSA Long  Heart Transplant  [facility]

## 2017-12-29 NOTE — PHYSICIAN QUERY
"PT Name: Suman Hayden  MR #: [id number]    Physician Query Form - Hematology Clarification      CDS/: Rashmi Iraheta RN, CCDS             Contact information: [email or web address]    This form is a permanent document in the medical record.      Query Date: December 29, 2017    By submitting this query, we are merely seeking further clarification of documentation. Please utilize your independent clinical judgment when addressing the question(s) below.    The Medical record contains the following:   Indicators  Supporting Clinical Findings Location in Medical Record    "Anemia" documented     X H & H = 7.5/23.3->6.9/22.2-->7/22.7 12/27, 12/28, 12/29 lab    BP =                     HR=      "GI bleeding" documented      Acute bleeding (Non GI site)      Transfusion(s)     X Treatment: Melena from previous admission still present but improving   Follow CBC and adjust AC plans if HGB drops or melena worsens  12/27 h/p   X Other:   ulcer requiring PRBC transfusion and EGD for which he was recently discharged on 12/22/17.  12/27 h/p     Provider, please specify diagnosis or diagnoses associated with above clinical findings.    [  ] Acute blood loss anemia  [ X ] Iron deficiency anemia  [  ] Chronic blood loss anemia  [  ] Anemia of chronic disease ( Specify chronic disease)   [  ] Other Hematological Diagnosis (please specify): _________________________________    [  ] Clinically Undetermined       Please document in your progress notes daily for the duration of treatment, until resolved, and include in your discharge summary.                                                                                                      "

## 2017-12-29 NOTE — PROGRESS NOTES
Mr. Hayden will be discharged today after admission for subtherapeutic INR.  PMH includes HM3 LVAD with recent admission for GIB likely from ulcers.  He remains off ASA, with an INR goal of 2-3, requiring a bridge.  He was discharged with instructions to take a larger dose of warfarin at 4mg on 12/29 only, followed by a weekly dose of 3mg/day, except 2mg on Saturdays.  Recommend close INR f/u on Tuesday.       Suman Hayden   Home Medication Instructions MYRANDA:[id number]    Printed on:12/29/17 1630   Medication Information                      amLODIPine (NORVASC) 10 MG tablet  Take 1 tablet (10 mg total) by mouth once daily.             dronabinol (MARINOL) 5 MG capsule  Take 1 capsule (5 mg total) by mouth 3 (three) times daily before meals.             furosemide (LASIX) 40 MG tablet  Take 1 tablet (40 mg total) by mouth 2 (two) times daily.             hydrALAZINE (APRESOLINE) 50 MG tablet  Take 1 tablet (50 mg total) by mouth every 8 (eight) hours.             magnesium oxide (MAG-OX) 400 mg tablet  Take 1 tablet (400 mg total) by mouth 2 (two) times daily.             pantoprazole (PROTONIX) 40 MG tablet  Take 1 tablet (40 mg total) by mouth 2 (two) times daily before meals.             polyethylene glycol (GLYCOLAX) 17 gram/dose powder  Take 17 g by mouth daily as needed.             potassium chloride SA (K-DUR,KLOR-CON) 20 MEQ tablet  Take 2 tablets (40 mEq total) by mouth once daily.             pravastatin (PRAVACHOL) 20 MG tablet  Take 1 tablet (20 mg total) by mouth every evening.             warfarin (COUMADIN) 2 MG tablet  4mg 12/29 only, followed by weekly dose of 3mg/day, except 2mg on Saturdays

## 2017-12-29 NOTE — PLAN OF CARE
Problem: Patient Care Overview  Goal: Plan of Care Review  Patient remained free from fall/injury throughout shift. No complaints of chest pain, SOB or discomfort. VSS. LVAD numbers WDL. Doppler numbers WDL. Anti-Xa therapeutic at 0.35. INR 1.4. Heparin gtt remains infusing at 21 units/kg at 14.6 ml/hr. LVAD dressing CDI and completed by spouse. Plan of care reviewed with patient. Patient verbalized understanding. Will continue to monitor.

## 2017-12-29 NOTE — PROGRESS NOTES
PT D/C HOME PER MD ORDERS. TELE REMOVED, IV ACCESS REMOVED AND INTACT X1. VSS, NAD AND NO COMPLAINTS AT THIS TIME. PT GIVEN AND EXPLAINED MED LIST AND PRESCRIPTIONS. PT VERBALIZES COMPLETE UNDERSTANDING OF ALL D/C INSTRUCTIONS AND FOLLOW UP CARE. PT GIVEN PRINTED AVS. PT AWAITING FAMILY ARRIVAL AND PT ESCORT. WILL CONTINUE TO MONITOR

## 2017-12-29 NOTE — PROGRESS NOTES
DISCHARGE    Sw to pt's room for d/c plan. Pt presents as aaox3 with calm affect. Pt's wife at bedside. Pt and wife reports coping adequately and in agreement with plan to d/c home today with resumed home health via [facility] ph: [phone], f: [phone]. Sw notified [facility]  of d/c and confirmed they have access to pt's chart. Pt's wife will provide transportation home. No other needs voiced or indicated at this time. SW providing psychosocial and counseling support, education, resources, and d/c planning as needed. SW remains available.

## 2017-12-29 NOTE — HOSPITAL COURSE
Given a boost of coumadin (4 mg) and next day INR had risen to 1.7 from 1.4. Because the INR was rising and he has history of GI bleeds, decision was made patient could discharge with INR check next week.

## 2017-12-29 NOTE — PHYSICIAN QUERY
"PT Name: Suman Hayden  MR #: [id number]    Physician Query Form - Heart  Condition Clarification     CDS/: Rashmi Iraheta RN, CCDS            Contact information: [email or web address]  This form is a permanent document in the medical record.     Query Date: December 29, 2017    By submitting this query, we are merely seeking further clarification of documentation. Please utilize your independent clinical judgment when addressing the question(s) below.    The medical record contains the following   Indicators     Supporting Clinical Findings Location in Medical Record   X  12/29 lab   X EF (EF 10%)  12/27 h/p    Radiology findings      Echo Results      "Ascites" documented      "SOB" or "PEREIRA" documented      "Hypoxia" documented     X Heart Failure documented doing well with no recent flair in CHF symptoms  12/27 h/p    "Edema" documented     X Diuretics/Meds Furosemide 40 mg po 2 x daily Mar 12/28, 12/29    Treatment:     X Other:  NICM S/P Heartmate 3 7/27/2017 12/27 h/p       Provider, please specify diagnosis or diagnoses associated with above clinical findings.                                 [  ] Chronic Systolic Heart Failure (EF < 40)*  [ X ] Chronic Combined Systolic and Diastolic Heart Failure  [  ] Other Type of Heart Failure (please specify type): _________________________  [  ] Other (please specify): ___________________________________  [  ] Clinically Undetermined            *American Heart Association                                                                                                          Please document in your progress notes daily for the duration of treatment until resolved and include in your discharge summary.    "

## 2017-12-29 NOTE — PHYSICIAN QUERY
PT Name: Suman Hayden  MR #: [id number]     Physician Query Form - Documentation Clarification      CDS/: Rashmi Iraheta RN, CCDS             Contact information: [email or web address]    This form is a permanent document in the medical record.     Query Date: December 29, 2017    By submitting this query, we are merely seeking further clarification of documentation. Please utilize your independent clinical judgment when addressing the question(s) below.    The Medical record reflects the following:    Supporting Clinical Findings Location in Medical Record     NICM ef 10%    s/p HeartMate 3 Implanted 7/27/2017 as    12/27 h/p                                                                                      Doctor, Please specify diagnosis or diagnoses associated with above clinical findings.  Please further specify cardiomyopathy.    Provider Use Only      (  X  )  Dilated Cardiomyopathy    (   )  Other cardiomyopathy, please specify___________________                                                                                                                         [  ] Clinically undetermined

## 2017-12-29 NOTE — ASSESSMENT & PLAN NOTE
HM3 implanted 7/2017 as DT  ASA recently stopped due to NSAID induced ulcer and major GI bleed   INR goal of 2-3. INR 1.7 today, with recent bleeding and increase in INR will dc home and check next week  Intermittently pulsatile   MAP goal of 60-80  Continue Amlodipine 10mg and hydralazine 50mg   Lasix 40 mg BID

## 2017-12-29 NOTE — SUBJECTIVE & OBJECTIVE
Interval History: patient INR increased to 1.7, no melena in stools    Continuous Infusions:   heparin (porcine) in D5W 21 Units/kg/hr (12/29/17 0847)     Scheduled Meds:   amLODIPine  10 mg Oral Daily    dronabinol  5 mg Oral TID AC    furosemide  40 mg Oral BID    hydrALAZINE  50 mg Oral Q8H    magnesium oxide  400 mg Oral BID    pantoprazole  40 mg Oral BID    pravastatin  20 mg Oral QHS    warfarin  4 mg Oral Daily     PRN Meds:polyethylene glycol    Review of patient's allergies indicates:  No Known Allergies  Objective:     Vital Signs (Most Recent):  Temp: 96.3 °F (35.7 °C) (12/29/17 0900)  Pulse: 80 (12/29/17 1054)  Resp: 18 (12/29/17 0900)  BP: (!) 96/58 (12/29/17 0900)  SpO2: (!) 93 % (12/29/17 0900) Vital Signs (24h Range):  Temp:  [96.3 °F (35.7 °C)-99.1 °F (37.3 °C)] 96.3 °F (35.7 °C)  Pulse:  [75-81] 80  Resp:  [16-18] 18  SpO2:  [93 %-99 %] 93 %  BP: ()/(0-64) 96/58     Patient Vitals for the past 72 hrs (Last 3 readings):   Weight   12/29/17 1000 64 kg (141 lb 1.5 oz)   12/28/17 0900 64.6 kg (142 lb 6.7 oz)   12/27/17 2150 69.4 kg (152 lb 14.4 oz)     Body mass index is 20.84 kg/m².      Intake/Output Summary (Last 24 hours) at 12/29/17 1120  Last data filed at 12/29/17 0000   Gross per 24 hour   Intake             1160 ml   Output             2225 ml   Net            -1065 ml       Hemodynamic Parameters:         Physical Exam   Constitutional: He appears well-developed and well-nourished. No distress.   HENT:   Head: Normocephalic.   Eyes: Pupils are equal, round, and reactive to light.   Neck: Normal range of motion. No JVD present.   Cardiovascular: Normal rate.  Exam reveals no friction rub.    No murmur heard.  Pulmonary/Chest: Effort normal. No respiratory distress. He has no wheezes.   Abdominal: Soft. He exhibits no distension.   Neurological: He is alert.   Skin: Skin is warm. He is not diaphoretic. No erythema.       Significant Labs:  CBC:    Recent Labs  Lab 12/27/17  2140  12/28/17 0422 12/29/17  0626   WBC 5.83 5.72 5.79   RBC 2.76* 2.63* 2.65*   HGB 7.5* 6.9* 7.0*   HCT 23.3* 22.2* 22.7*    226 231   MCV 84 84 86   MCH 27.2 26.2* 26.4*   MCHC 32.2 31.1* 30.8*     BNP:    Recent Labs  Lab 12/29/17 0626   *     CMP:    Recent Labs  Lab 12/27/17 2140 12/28/17 0422 12/29/17 0626   GLU 86 73 77   CALCIUM 9.0 8.9 8.8    137 138   K 3.6 3.5 3.4*   CO2 28 30* 29    100 102   BUN 16 15 12   CREATININE 0.9 0.8 0.8      Coagulation:     Recent Labs  Lab 12/27/17 12/28/17 0423 12/29/17 0626   INR 1.4 1.4* 1.7*     LDH:    Recent Labs  Lab 12/27/17 2140 12/28/17 0422 12/29/17 0626    147 169     Microbiology:  Microbiology Results (last 7 days)     ** No results found for the last 168 hours. **          I have reviewed all pertinent labs within the past 24 hours.    Estimated Creatinine Clearance: 81.1 mL/min (based on SCr of 0.8 mg/dL).    Diagnostic Results:  I have reviewed and interpreted all pertinent imaging results/findings within the past 24 hours.

## 2017-12-29 NOTE — PROGRESS NOTES
[facility]  Heart Transplant  Progress Note    Patient Name: Suman Hayden  MRN: [id number]  Admission Date: 12/27/2017  Hospital Length of Stay: 2 days  Attending Physician: Ortega Leos MD  Primary Care Provider: Joe Ernst MD  Principal Problem:Subtherapeutic international normalized ratio (INR)    Subjective:     Interval History: patient INR increased to 1.7, no melena in stools    Continuous Infusions:   heparin (porcine) in D5W 21 Units/kg/hr (12/29/17 0847)     Scheduled Meds:   amLODIPine  10 mg Oral Daily    dronabinol  5 mg Oral TID AC    furosemide  40 mg Oral BID    hydrALAZINE  50 mg Oral Q8H    magnesium oxide  400 mg Oral BID    pantoprazole  40 mg Oral BID    pravastatin  20 mg Oral QHS    warfarin  4 mg Oral Daily     PRN Meds:polyethylene glycol    Review of patient's allergies indicates:  No Known Allergies  Objective:     Vital Signs (Most Recent):  Temp: 96.3 °F (35.7 °C) (12/29/17 0900)  Pulse: 80 (12/29/17 1054)  Resp: 18 (12/29/17 0900)  BP: (!) 96/58 (12/29/17 0900)  SpO2: (!) 93 % (12/29/17 0900) Vital Signs (24h Range):  Temp:  [96.3 °F (35.7 °C)-99.1 °F (37.3 °C)] 96.3 °F (35.7 °C)  Pulse:  [75-81] 80  Resp:  [16-18] 18  SpO2:  [93 %-99 %] 93 %  BP: ()/(0-64) 96/58     Patient Vitals for the past 72 hrs (Last 3 readings):   Weight   12/29/17 1000 64 kg (141 lb 1.5 oz)   12/28/17 0900 64.6 kg (142 lb 6.7 oz)   12/27/17 2150 69.4 kg (152 lb 14.4 oz)     Body mass index is 20.84 kg/m².      Intake/Output Summary (Last 24 hours) at 12/29/17 1120  Last data filed at 12/29/17 0000   Gross per 24 hour   Intake             1160 ml   Output             2225 ml   Net            -1065 ml       Hemodynamic Parameters:         Physical Exam   Constitutional: He appears well-developed and well-nourished. No distress.   HENT:   Head: Normocephalic.   Eyes: Pupils are equal, round, and reactive to light.   Neck: Normal range of motion. No JVD present.    Cardiovascular: Normal rate.  Exam reveals no friction rub.    No murmur heard.  Pulmonary/Chest: Effort normal. No respiratory distress. He has no wheezes.   Abdominal: Soft. He exhibits no distension.   Neurological: He is alert.   Skin: Skin is warm. He is not diaphoretic. No erythema.       Significant Labs:  CBC:    Recent Labs  Lab 12/27/17 2140 12/28/17 0422 12/29/17  0626   WBC 5.83 5.72 5.79   RBC 2.76* 2.63* 2.65*   HGB 7.5* 6.9* 7.0*   HCT 23.3* 22.2* 22.7*    226 231   MCV 84 84 86   MCH 27.2 26.2* 26.4*   MCHC 32.2 31.1* 30.8*     BNP:    Recent Labs  Lab 12/29/17  0626   *     CMP:    Recent Labs  Lab 12/27/17 2140 12/28/17  0422 12/29/17  0626   GLU 86 73 77   CALCIUM 9.0 8.9 8.8    137 138   K 3.6 3.5 3.4*   CO2 28 30* 29    100 102   BUN 16 15 12   CREATININE 0.9 0.8 0.8      Coagulation:     Recent Labs  Lab 12/27/17 12/28/17  0423 12/29/17  0626   INR 1.4 1.4* 1.7*     LDH:    Recent Labs  Lab 12/27/17 2140 12/28/17 0422 12/29/17  0626    147 169     Microbiology:  Microbiology Results (last 7 days)     ** No results found for the last 168 hours. **          I have reviewed all pertinent labs within the past 24 hours.    Estimated Creatinine Clearance: 81.1 mL/min (based on SCr of 0.8 mg/dL).    Diagnostic Results:  I have reviewed and interpreted all pertinent imaging results/findings within the past 24 hours.    Assessment and Plan:     68 y/o male with a PMHx of NICM (EF 10%) s/p HeartMate 3 Implanted 7/27/2017 as DT, HTN, DLD, recent ICD revision on 11/20/17 with Dr. Vidal (DC ICD placed with active RA/LV leads (RV lead capped during revision) that was complicated by pocket hematoma and recent GIB bleed due to ileal (NSAID induced) ulcer requiring PRBC transfusion and EGD for which he was recently discharged on 12/22/17. ASA stopped at time and PPI increased to BID at time of discharge.     He presents today with subtherapeutic INR of 1.4. Otherwise  "doing well with no recent flair in CHF symptoms and significantly improved melena (BMs almost back to normal). Still not tolerating any oral iron supplementation despite multiple tries.       * Subtherapeutic international normalized ratio (INR)    INR goal 2-3   INR 1.7 this AM   Will dc home and follow up inr next week  Follow CBC given recent GI bleed (NSAID induced ulcer)           Ileum ulcer    Per report "NSAID induced ulcer"  Melena from previous admission still present but improving   Continue BID PPI   Patient not tolerant of oral / IV iron per records and is not willing to retry as of now   No role for further GI involvement at this point. Recent EGD note reviewed.         LVAD (left ventricular assist device) present    HM3 implanted 7/2017 as DT  ASA recently stopped due to NSAID induced ulcer and major GI bleed   INR goal of 2-3. INR 1.7 today, with recent bleeding and increase in INR will dc home and check next week  Intermittently pulsatile   MAP goal of 60-80  Continue Amlodipine 10mg and hydralazine 50mg   Lasix 40 mg BID            MELISSA Long  Heart Transplant  [facility]  "

## 2017-12-29 NOTE — PLAN OF CARE
[facility]   Heart Transplant/VAD Clinic   [address] [place] [address]   [phone] after hours          [phone] fax     VAD HOME  HEALTH ORDERS      Admit to Home Health    Diagnosis:   Patient Active Problem List   Diagnosis    Nonischemic cardiomyopathy    Encounter for monitoring amiodarone therapy    Essential hypertension    Hyperlipidemia    V-tach    Elevated PSA    Hepatitis B core antibody positive since 2012    Chronic systolic congestive heart failure    Heart transplant candidate    Hyperbilirubinemia    Malfunction of implantable cardioverter-defibrillator (ICD) electrode    Syncope and collapse    Atrial tachycardia    Hyperglycemia    AICD (automatic cardioverter/defibrillator) present    LVAD (left ventricular assist device) present    Atrial fibrillation    Heart replaced by heart assist device    Anticoagulation monitoring, INR range 2-3    Defibrillator discharge    ICD (implantable cardioverter-defibrillator) pocket hematoma    Subtherapeutic international normalized ratio (INR)    Constipation    Normocytic anemia    Anemia    Ileum ulcer       Patient is homebound due to:  CHF    Diet: Low Fat, Low cholesterol, 2Gm Na, Coumadin restrictions.    Acitivities: No Swimming, bathing, vacuuming, contact sports.    Fresh implants= Sternal Precautions    Nursing:   SN to complete comprehensive assessment including routine vital signs. Instruct on disease process and s/s of complications to report to MD. Review/verify medication list sent home with the patient at time of discharge  and instruct patient/caregiver as needed. Frequency may be adjusted depending on start of care date.    **LVAD driveline exit site dressing change is to be completed per LVAD patient/caregiver only**.    Notify MD if SBP > 160 or < 90; DBP > 90 or < 50; HR > 120 or < 50; Temp > 101; Weight gain >3lbs in 1 day or 5lbs in 1  week.        LABS:  SN to perform labs: PT/INR per Coumadin clinic [phone].   Follow up INR date: 1/03/2018  No Finger Sticks                Send initial Home Health orders to [facility] attending physician on call.  Send follow up questions to VAD clinic MD [phone] or [phone].

## 2018-01-01 ENCOUNTER — ANTI-COAG VISIT (OUTPATIENT)
Dept: CARDIOLOGY | Facility: CLINIC | Age: 68
End: 2018-01-01

## 2018-01-01 ENCOUNTER — LAB VISIT (OUTPATIENT)
Dept: LAB | Facility: HOSPITAL | Age: 68
End: 2018-01-01
Attending: INTERNAL MEDICINE
Payer: MEDICARE

## 2018-01-01 ENCOUNTER — SOCIAL WORK (OUTPATIENT)
Dept: TRANSPLANT | Facility: CLINIC | Age: 68
End: 2018-01-01

## 2018-01-01 ENCOUNTER — ANESTHESIA (OUTPATIENT)
Dept: ENDOSCOPY | Facility: HOSPITAL | Age: 68
DRG: 330 | End: 2018-01-01
Payer: MEDICARE

## 2018-01-01 ENCOUNTER — OFFICE VISIT (OUTPATIENT)
Dept: TRANSPLANT | Facility: CLINIC | Age: 68
End: 2018-01-01
Payer: MEDICARE

## 2018-01-01 ENCOUNTER — ANESTHESIA (OUTPATIENT)
Dept: INTENSIVE CARE | Facility: HOSPITAL | Age: 68
DRG: 314 | End: 2018-01-01
Payer: MEDICARE

## 2018-01-01 ENCOUNTER — TELEPHONE (OUTPATIENT)
Dept: ELECTROPHYSIOLOGY | Facility: CLINIC | Age: 68
End: 2018-01-01

## 2018-01-01 ENCOUNTER — PATIENT OUTREACH (OUTPATIENT)
Dept: ADMINISTRATIVE | Facility: CLINIC | Age: 68
End: 2018-01-01

## 2018-01-01 ENCOUNTER — CLINICAL SUPPORT (OUTPATIENT)
Dept: CARDIOLOGY | Facility: CLINIC | Age: 68
End: 2018-01-01
Attending: INTERNAL MEDICINE
Payer: MEDICARE

## 2018-01-01 ENCOUNTER — CLINICAL SUPPORT (OUTPATIENT)
Dept: TRANSPLANT | Facility: CLINIC | Age: 68
End: 2018-01-01
Payer: MEDICARE

## 2018-01-01 ENCOUNTER — ANESTHESIA EVENT (OUTPATIENT)
Dept: ENDOSCOPY | Facility: HOSPITAL | Age: 68
DRG: 378 | End: 2018-01-01
Payer: MEDICARE

## 2018-01-01 ENCOUNTER — HOSPITAL ENCOUNTER (OUTPATIENT)
Dept: CARDIOLOGY | Facility: CLINIC | Age: 68
Discharge: HOME | End: 2018-07-12
Attending: THORACIC SURGERY (CARDIOTHORACIC VASCULAR SURGERY)
Payer: MEDICARE

## 2018-01-01 ENCOUNTER — TELEPHONE (OUTPATIENT)
Dept: TRANSPLANT | Facility: CLINIC | Age: 68
End: 2018-01-01

## 2018-01-01 ENCOUNTER — DOCUMENTATION ONLY (OUTPATIENT)
Dept: TRANSPLANT | Facility: CLINIC | Age: 68
End: 2018-01-01

## 2018-01-01 ENCOUNTER — HOSPITAL ENCOUNTER (EMERGENCY)
Facility: HOSPITAL | Age: 68
Discharge: ANOTHER HOSPITAL | End: 2018-06-14
Attending: EMERGENCY MEDICINE
Payer: MEDICARE

## 2018-01-01 ENCOUNTER — ANESTHESIA EVENT (OUTPATIENT)
Dept: ANESTHESIOLOGY | Facility: HOSPITAL | Age: 68
End: 2018-01-01

## 2018-01-01 ENCOUNTER — ANESTHESIA EVENT (OUTPATIENT)
Dept: MEDSURG UNIT | Facility: HOSPITAL | Age: 68
DRG: 948 | End: 2018-01-01
Payer: MEDICARE

## 2018-01-01 ENCOUNTER — HOSPITAL ENCOUNTER (OUTPATIENT)
Facility: HOSPITAL | Age: 68
Discharge: HOME | End: 2018-06-15
Attending: INTERNAL MEDICINE | Admitting: INTERNAL MEDICINE
Payer: MEDICARE

## 2018-01-01 ENCOUNTER — OFFICE VISIT (OUTPATIENT)
Dept: INFECTIOUS DISEASES | Facility: CLINIC | Age: 68
End: 2018-01-01
Payer: MEDICARE

## 2018-01-01 ENCOUNTER — HOSPITAL ENCOUNTER (EMERGENCY)
Facility: HOSPITAL | Age: 68
Discharge: HOME | End: 2018-05-20
Attending: EMERGENCY MEDICINE
Payer: MEDICARE

## 2018-01-01 ENCOUNTER — CLINICAL SUPPORT (OUTPATIENT)
Dept: INFECTIOUS DISEASES | Facility: CLINIC | Age: 68
End: 2018-01-01
Payer: MEDICARE

## 2018-01-01 ENCOUNTER — TELEPHONE (OUTPATIENT)
Dept: ADMINISTRATIVE | Facility: CLINIC | Age: 68
End: 2018-01-01

## 2018-01-01 ENCOUNTER — OFFICE VISIT (OUTPATIENT)
Dept: TRANSPLANT | Facility: CLINIC | Age: 68
End: 2018-01-01
Attending: INTERNAL MEDICINE
Payer: MEDICARE

## 2018-01-01 ENCOUNTER — HOSPITAL ENCOUNTER (INPATIENT)
Facility: HOSPITAL | Age: 68
LOS: 8 days | Discharge: HOME HEALTH | DRG: 378 | End: 2018-01-31
Attending: INTERNAL MEDICINE | Admitting: INTERNAL MEDICINE
Payer: MEDICARE

## 2018-01-01 ENCOUNTER — CLINICAL SUPPORT (OUTPATIENT)
Dept: TRANSPLANT | Facility: CLINIC | Age: 68
DRG: 314 | End: 2018-01-01
Payer: MEDICARE

## 2018-01-01 ENCOUNTER — ANESTHESIA (OUTPATIENT)
Dept: SURGERY | Facility: HOSPITAL | Age: 68
DRG: 330 | End: 2018-01-01
Payer: MEDICARE

## 2018-01-01 ENCOUNTER — DOCUMENTATION ONLY (OUTPATIENT)
Dept: ELECTROPHYSIOLOGY | Facility: CLINIC | Age: 68
End: 2018-01-01

## 2018-01-01 ENCOUNTER — DOCUMENTATION ONLY (OUTPATIENT)
Dept: CARDIOLOGY | Facility: CLINIC | Age: 68
End: 2018-01-01

## 2018-01-01 ENCOUNTER — ANESTHESIA EVENT (OUTPATIENT)
Dept: CARDIOLOGY | Facility: HOSPITAL | Age: 68
End: 2018-01-01

## 2018-01-01 ENCOUNTER — ANESTHESIA (OUTPATIENT)
Dept: ANESTHESIOLOGY | Facility: HOSPITAL | Age: 68
End: 2018-01-01

## 2018-01-01 ENCOUNTER — RESEARCH ENCOUNTER (OUTPATIENT)
Dept: RESEARCH | Facility: HOSPITAL | Age: 68
End: 2018-01-01

## 2018-01-01 ENCOUNTER — OFFICE VISIT (OUTPATIENT)
Dept: SURGERY | Facility: CLINIC | Age: 68
End: 2018-01-01
Payer: MEDICARE

## 2018-01-01 ENCOUNTER — OFFICE VISIT (OUTPATIENT)
Dept: CARDIOLOGY | Facility: CLINIC | Age: 68
End: 2018-01-01
Payer: MEDICARE

## 2018-01-01 ENCOUNTER — HOSPITAL ENCOUNTER (INPATIENT)
Facility: HOSPITAL | Age: 68
LOS: 8 days | Discharge: HOME HEALTH | DRG: 948 | End: 2018-01-10
Attending: INTERNAL MEDICINE | Admitting: INTERNAL MEDICINE
Payer: MEDICARE

## 2018-01-01 ENCOUNTER — INFUSION (OUTPATIENT)
Dept: INFECTIOUS DISEASES | Facility: HOSPITAL | Age: 68
End: 2018-01-01
Attending: INTERNAL MEDICINE
Payer: MEDICARE

## 2018-01-01 ENCOUNTER — ANESTHESIA EVENT (OUTPATIENT)
Dept: SURGERY | Facility: HOSPITAL | Age: 68
DRG: 330 | End: 2018-01-01
Payer: MEDICARE

## 2018-01-01 ENCOUNTER — HOSPITAL ENCOUNTER (INPATIENT)
Facility: HOSPITAL | Age: 68
LOS: 8 days | Discharge: HOME | DRG: 312 | End: 2018-10-17
Attending: INTERNAL MEDICINE | Admitting: INTERNAL MEDICINE
Payer: MEDICARE

## 2018-01-01 ENCOUNTER — HOSPITAL ENCOUNTER (INPATIENT)
Facility: HOSPITAL | Age: 68
LOS: 6 days | Discharge: HOME | DRG: 378 | End: 2018-08-11
Attending: INTERNAL MEDICINE | Admitting: INTERNAL MEDICINE
Payer: MEDICARE

## 2018-01-01 ENCOUNTER — ANESTHESIA (OUTPATIENT)
Dept: ENDOSCOPY | Facility: HOSPITAL | Age: 68
DRG: 378 | End: 2018-01-01
Payer: MEDICARE

## 2018-01-01 ENCOUNTER — TELEPHONE (OUTPATIENT)
Dept: CARDIOTHORACIC SURGERY | Facility: CLINIC | Age: 68
End: 2018-01-01

## 2018-01-01 ENCOUNTER — HOSPITAL ENCOUNTER (EMERGENCY)
Facility: HOSPITAL | Age: 68
Discharge: ANOTHER HOSPITAL | End: 2018-08-05
Attending: EMERGENCY MEDICINE
Payer: MEDICARE

## 2018-01-01 ENCOUNTER — TELEPHONE (OUTPATIENT)
Dept: CARDIOLOGY | Facility: CLINIC | Age: 68
End: 2018-01-01

## 2018-01-01 ENCOUNTER — HOSPITAL ENCOUNTER (INPATIENT)
Facility: HOSPITAL | Age: 68
LOS: 3 days | Discharge: HOME | DRG: 308 | End: 2018-09-28
Attending: EMERGENCY MEDICINE | Admitting: INTERNAL MEDICINE
Payer: MEDICARE

## 2018-01-01 ENCOUNTER — ANESTHESIA EVENT (OUTPATIENT)
Dept: ENDOSCOPY | Facility: HOSPITAL | Age: 68
DRG: 330 | End: 2018-01-01
Payer: MEDICARE

## 2018-01-01 ENCOUNTER — HOSPITAL ENCOUNTER (OUTPATIENT)
Dept: PULMONOLOGY | Facility: CLINIC | Age: 68
Discharge: HOME | End: 2018-11-14
Attending: INTERNAL MEDICINE
Payer: MEDICARE

## 2018-01-01 ENCOUNTER — CLINICAL SUPPORT (OUTPATIENT)
Dept: CARDIOLOGY | Facility: CLINIC | Age: 68
End: 2018-01-01
Payer: MEDICARE

## 2018-01-01 ENCOUNTER — ANESTHESIA (OUTPATIENT)
Dept: MEDSURG UNIT | Facility: HOSPITAL | Age: 68
DRG: 948 | End: 2018-01-01
Payer: MEDICARE

## 2018-01-01 ENCOUNTER — HOSPITAL ENCOUNTER (EMERGENCY)
Facility: HOSPITAL | Age: 68
Discharge: OTHER INSTITUTION | End: 2018-10-09
Attending: EMERGENCY MEDICINE
Payer: MEDICARE

## 2018-01-01 ENCOUNTER — SURGERY (OUTPATIENT)
Age: 68
End: 2018-01-01

## 2018-01-01 ENCOUNTER — HOSPITAL ENCOUNTER (EMERGENCY)
Facility: HOSPITAL | Age: 68
Discharge: ANOTHER HOSPITAL | End: 2018-10-24
Attending: EMERGENCY MEDICINE
Payer: MEDICARE

## 2018-01-01 ENCOUNTER — OFFICE VISIT (OUTPATIENT)
Dept: CARDIOTHORACIC SURGERY | Facility: CLINIC | Age: 68
End: 2018-01-01
Payer: MEDICARE

## 2018-01-01 ENCOUNTER — HOSPITAL ENCOUNTER (OUTPATIENT)
Dept: RADIOLOGY | Facility: HOSPITAL | Age: 68
Discharge: HOME | End: 2018-05-09
Attending: INTERNAL MEDICINE
Payer: MEDICARE

## 2018-01-01 ENCOUNTER — COMMITTEE REVIEW (OUTPATIENT)
Dept: TRANSPLANT | Facility: CLINIC | Age: 68
End: 2018-01-01

## 2018-01-01 ENCOUNTER — TELEPHONE (OUTPATIENT)
Dept: INFECTIOUS DISEASES | Facility: CLINIC | Age: 68
End: 2018-01-01

## 2018-01-01 ENCOUNTER — ANESTHESIA (OUTPATIENT)
Dept: MEDSURG UNIT | Facility: HOSPITAL | Age: 68
DRG: 314 | End: 2018-01-01
Payer: MEDICARE

## 2018-01-01 ENCOUNTER — ANESTHESIA EVENT (OUTPATIENT)
Dept: MEDSURG UNIT | Facility: HOSPITAL | Age: 68
DRG: 314 | End: 2018-01-01
Payer: MEDICARE

## 2018-01-01 ENCOUNTER — HOSPITAL ENCOUNTER (INPATIENT)
Facility: HOSPITAL | Age: 68
LOS: 10 days | DRG: 314 | End: 2018-12-03
Attending: INTERNAL MEDICINE | Admitting: INTERNAL MEDICINE
Payer: MEDICARE

## 2018-01-01 ENCOUNTER — EDUCATION (OUTPATIENT)
Dept: TRANSPLANT | Facility: CLINIC | Age: 68
End: 2018-01-01

## 2018-01-01 ENCOUNTER — HOSPITAL ENCOUNTER (INPATIENT)
Facility: HOSPITAL | Age: 68
LOS: 23 days | Discharge: HOME HEALTH | DRG: 330 | End: 2018-03-28
Attending: INTERNAL MEDICINE | Admitting: INTERNAL MEDICINE
Payer: MEDICARE

## 2018-01-01 ENCOUNTER — HOSPITAL ENCOUNTER (OUTPATIENT)
Dept: RADIOLOGY | Facility: HOSPITAL | Age: 68
Discharge: HOME | End: 2018-07-12
Attending: THORACIC SURGERY (CARDIOTHORACIC VASCULAR SURGERY)
Payer: MEDICARE

## 2018-01-01 ENCOUNTER — ANESTHESIA (OUTPATIENT)
Dept: CARDIOLOGY | Facility: HOSPITAL | Age: 68
End: 2018-01-01

## 2018-01-01 ENCOUNTER — HOSPITAL ENCOUNTER (OUTPATIENT)
Facility: HOSPITAL | Age: 68
Discharge: HOME | End: 2018-06-26
Attending: EMERGENCY MEDICINE | Admitting: INTERNAL MEDICINE
Payer: MEDICARE

## 2018-01-01 ENCOUNTER — TELEPHONE (OUTPATIENT)
Dept: INTENSIVE CARE | Facility: HOSPITAL | Age: 68
End: 2018-01-01

## 2018-01-01 ENCOUNTER — TELEPHONE (OUTPATIENT)
Dept: ADMINISTRATIVE | Facility: HOSPITAL | Age: 68
End: 2018-01-01

## 2018-01-01 ENCOUNTER — ANESTHESIA EVENT (OUTPATIENT)
Dept: INTENSIVE CARE | Facility: HOSPITAL | Age: 68
DRG: 314 | End: 2018-01-01
Payer: MEDICARE

## 2018-01-01 ENCOUNTER — HOSPITAL ENCOUNTER (EMERGENCY)
Facility: HOSPITAL | Age: 68
Discharge: PSYCHIATRIC OR SUBSTANCE USE FACILITY | End: 2018-01-23
Attending: EMERGENCY MEDICINE
Payer: MEDICARE

## 2018-01-01 ENCOUNTER — HOSPITAL ENCOUNTER (OUTPATIENT)
Dept: PULMONOLOGY | Facility: CLINIC | Age: 68
Discharge: HOME | End: 2018-08-29
Payer: MEDICARE

## 2018-01-01 ENCOUNTER — DOCUMENTATION ONLY (OUTPATIENT)
Dept: TRANSFUSION MEDICINE | Facility: HOSPITAL | Age: 68
End: 2018-01-01

## 2018-01-01 ENCOUNTER — TELEPHONE (OUTPATIENT)
Dept: INFECTIOUS DISEASES | Facility: HOSPITAL | Age: 68
End: 2018-01-01

## 2018-01-01 ENCOUNTER — SOCIAL WORK (OUTPATIENT)
Dept: TRANSPLANT | Facility: CLINIC | Age: 68
End: 2018-01-01
Payer: MEDICARE

## 2018-01-01 ENCOUNTER — HOSPITAL ENCOUNTER (INPATIENT)
Facility: HOSPITAL | Age: 68
LOS: 9 days | Discharge: HOME | DRG: 378 | End: 2018-11-02
Attending: INTERNAL MEDICINE | Admitting: INTERNAL MEDICINE
Payer: MEDICARE

## 2018-01-01 VITALS
OXYGEN SATURATION: 98 % | HEIGHT: 69 IN | DIASTOLIC BLOOD PRESSURE: 61 MMHG | RESPIRATION RATE: 20 BRPM | SYSTOLIC BLOOD PRESSURE: 77 MMHG | TEMPERATURE: 98 F | HEART RATE: 88 BPM | WEIGHT: 164.38 LBS | BODY MASS INDEX: 24.35 KG/M2

## 2018-01-01 VITALS
HEART RATE: 78 BPM | HEIGHT: 68 IN | WEIGHT: 170 LBS | HEIGHT: 66 IN | BODY MASS INDEX: 25.07 KG/M2 | BODY MASS INDEX: 27.32 KG/M2 | SYSTOLIC BLOOD PRESSURE: 90 MMHG | SYSTOLIC BLOOD PRESSURE: 84 MMHG | TEMPERATURE: 98 F | WEIGHT: 165.38 LBS | HEART RATE: 77 BPM

## 2018-01-01 VITALS
HEIGHT: 69 IN | WEIGHT: 164.31 LBS | BODY MASS INDEX: 24.34 KG/M2 | TEMPERATURE: 98 F | SYSTOLIC BLOOD PRESSURE: 86 MMHG | HEART RATE: 109 BPM | DIASTOLIC BLOOD PRESSURE: 76 MMHG

## 2018-01-01 VITALS — TEMPERATURE: 98 F | HEIGHT: 68 IN | BODY MASS INDEX: 24.86 KG/M2 | WEIGHT: 164 LBS

## 2018-01-01 VITALS
WEIGHT: 155 LBS | TEMPERATURE: 98 F | BODY MASS INDEX: 22.96 KG/M2 | SYSTOLIC BLOOD PRESSURE: 83 MMHG | HEIGHT: 69 IN | HEIGHT: 68 IN

## 2018-01-01 VITALS
WEIGHT: 159.19 LBS | RESPIRATION RATE: 18 BRPM | TEMPERATURE: 99 F | SYSTOLIC BLOOD PRESSURE: 90 MMHG | BODY MASS INDEX: 23.58 KG/M2 | HEART RATE: 80 BPM | OXYGEN SATURATION: 97 % | HEIGHT: 69 IN

## 2018-01-01 VITALS — TEMPERATURE: 99 F | BODY MASS INDEX: 24.11 KG/M2 | SYSTOLIC BLOOD PRESSURE: 77 MMHG | HEIGHT: 70 IN

## 2018-01-01 VITALS
HEIGHT: 69 IN | TEMPERATURE: 98 F | SYSTOLIC BLOOD PRESSURE: 102 MMHG | WEIGHT: 154 LBS | HEART RATE: 80 BPM | BODY MASS INDEX: 22.81 KG/M2

## 2018-01-01 VITALS — HEIGHT: 69 IN | BODY MASS INDEX: 25.24 KG/M2 | TEMPERATURE: 97 F | HEART RATE: 106 BPM | WEIGHT: 170.44 LBS

## 2018-01-01 VITALS
SYSTOLIC BLOOD PRESSURE: 100 MMHG | WEIGHT: 166 LBS | HEART RATE: 64 BPM | BODY MASS INDEX: 24.59 KG/M2 | DIASTOLIC BLOOD PRESSURE: 62 MMHG | HEIGHT: 69 IN

## 2018-01-01 VITALS
TEMPERATURE: 98 F | RESPIRATION RATE: 18 BRPM | WEIGHT: 162.5 LBS | HEIGHT: 69 IN | OXYGEN SATURATION: 100 % | SYSTOLIC BLOOD PRESSURE: 76 MMHG | BODY MASS INDEX: 24.07 KG/M2 | HEART RATE: 80 BPM

## 2018-01-01 VITALS — TEMPERATURE: 98 F | HEIGHT: 69 IN | WEIGHT: 166 LBS | BODY MASS INDEX: 24.59 KG/M2 | SYSTOLIC BLOOD PRESSURE: 72 MMHG

## 2018-01-01 VITALS
RESPIRATION RATE: 16 BRPM | BODY MASS INDEX: 25.27 KG/M2 | BODY MASS INDEX: 23.84 KG/M2 | HEART RATE: 106 BPM | WEIGHT: 160.94 LBS | OXYGEN SATURATION: 100 % | SYSTOLIC BLOOD PRESSURE: 76 MMHG | WEIGHT: 170.63 LBS | TEMPERATURE: 97 F | HEART RATE: 80 BPM | HEIGHT: 69 IN | HEIGHT: 69 IN | OXYGEN SATURATION: 98 % | TEMPERATURE: 98 F

## 2018-01-01 VITALS — TEMPERATURE: 98 F | SYSTOLIC BLOOD PRESSURE: 81 MMHG | WEIGHT: 173.06 LBS | BODY MASS INDEX: 25.63 KG/M2 | HEIGHT: 69 IN

## 2018-01-01 VITALS
RESPIRATION RATE: 18 BRPM | TEMPERATURE: 98 F | BODY MASS INDEX: 23.8 KG/M2 | WEIGHT: 170 LBS | HEART RATE: 79 BPM | HEIGHT: 71 IN | DIASTOLIC BLOOD PRESSURE: 50 MMHG | OXYGEN SATURATION: 100 % | SYSTOLIC BLOOD PRESSURE: 82 MMHG

## 2018-01-01 VITALS
HEART RATE: 140 BPM | WEIGHT: 168.88 LBS | SYSTOLIC BLOOD PRESSURE: 90 MMHG | OXYGEN SATURATION: 61 % | TEMPERATURE: 98 F | HEIGHT: 69 IN | BODY MASS INDEX: 25.01 KG/M2 | RESPIRATION RATE: 32 BRPM

## 2018-01-01 VITALS
RESPIRATION RATE: 16 BRPM | HEIGHT: 69 IN | DIASTOLIC BLOOD PRESSURE: 76 MMHG | HEART RATE: 79 BPM | BODY MASS INDEX: 25.6 KG/M2 | SYSTOLIC BLOOD PRESSURE: 107 MMHG | TEMPERATURE: 98 F | WEIGHT: 172.81 LBS | OXYGEN SATURATION: 100 %

## 2018-01-01 VITALS
WEIGHT: 153.69 LBS | HEART RATE: 79 BPM | BODY MASS INDEX: 21.52 KG/M2 | OXYGEN SATURATION: 100 % | RESPIRATION RATE: 16 BRPM | TEMPERATURE: 98 F | HEIGHT: 71 IN | SYSTOLIC BLOOD PRESSURE: 78 MMHG

## 2018-01-01 VITALS
HEIGHT: 69 IN | WEIGHT: 169.75 LBS | OXYGEN SATURATION: 99 % | BODY MASS INDEX: 21.16 KG/M2 | WEIGHT: 142.88 LBS | TEMPERATURE: 98 F | RESPIRATION RATE: 16 BRPM | SYSTOLIC BLOOD PRESSURE: 80 MMHG | OXYGEN SATURATION: 99 % | RESPIRATION RATE: 18 BRPM | SYSTOLIC BLOOD PRESSURE: 70 MMHG | HEART RATE: 80 BPM | HEIGHT: 69 IN | TEMPERATURE: 98 F | HEART RATE: 80 BPM | BODY MASS INDEX: 25.14 KG/M2

## 2018-01-01 VITALS
RESPIRATION RATE: 18 BRPM | HEART RATE: 79 BPM | BODY MASS INDEX: 23.25 KG/M2 | SYSTOLIC BLOOD PRESSURE: 80 MMHG | HEIGHT: 69 IN | WEIGHT: 156.94 LBS | TEMPERATURE: 98 F | OXYGEN SATURATION: 100 %

## 2018-01-01 VITALS
WEIGHT: 142 LBS | SYSTOLIC BLOOD PRESSURE: 80 MMHG | DIASTOLIC BLOOD PRESSURE: 67 MMHG | TEMPERATURE: 99 F | OXYGEN SATURATION: 100 % | RESPIRATION RATE: 16 BRPM | HEART RATE: 80 BPM | BODY MASS INDEX: 21.03 KG/M2 | HEIGHT: 69 IN

## 2018-01-01 VITALS
OXYGEN SATURATION: 97 % | WEIGHT: 147.5 LBS | BODY MASS INDEX: 21.84 KG/M2 | HEART RATE: 80 BPM | RESPIRATION RATE: 18 BRPM | SYSTOLIC BLOOD PRESSURE: 72 MMHG | HEIGHT: 69 IN | TEMPERATURE: 98 F

## 2018-01-01 VITALS
HEIGHT: 69 IN | TEMPERATURE: 78 F | SYSTOLIC BLOOD PRESSURE: 100 MMHG | RESPIRATION RATE: 16 BRPM | BODY MASS INDEX: 22.35 KG/M2 | OXYGEN SATURATION: 99 % | DIASTOLIC BLOOD PRESSURE: 72 MMHG | HEART RATE: 78 BPM | WEIGHT: 150.88 LBS

## 2018-01-01 VITALS
DIASTOLIC BLOOD PRESSURE: 69 MMHG | HEART RATE: 80 BPM | RESPIRATION RATE: 17 BRPM | HEIGHT: 69 IN | SYSTOLIC BLOOD PRESSURE: 93 MMHG | TEMPERATURE: 99 F | OXYGEN SATURATION: 100 % | WEIGHT: 171 LBS | BODY MASS INDEX: 25.33 KG/M2

## 2018-01-01 VITALS
BODY MASS INDEX: 22.36 KG/M2 | HEIGHT: 69 IN | RESPIRATION RATE: 18 BRPM | DIASTOLIC BLOOD PRESSURE: 59 MMHG | SYSTOLIC BLOOD PRESSURE: 113 MMHG | OXYGEN SATURATION: 100 % | HEART RATE: 79 BPM | WEIGHT: 151 LBS

## 2018-01-01 VITALS — HEIGHT: 69 IN | WEIGHT: 154 LBS | BODY MASS INDEX: 22.81 KG/M2

## 2018-01-01 VITALS — HEIGHT: 69 IN | BODY MASS INDEX: 25.92 KG/M2 | WEIGHT: 175 LBS | TEMPERATURE: 99 F

## 2018-01-01 VITALS
HEIGHT: 69 IN | WEIGHT: 168 LBS | HEART RATE: 81 BPM | TEMPERATURE: 98 F | SYSTOLIC BLOOD PRESSURE: 104 MMHG | BODY MASS INDEX: 24.88 KG/M2

## 2018-01-01 VITALS
HEIGHT: 69 IN | TEMPERATURE: 98 F | DIASTOLIC BLOOD PRESSURE: 75 MMHG | WEIGHT: 161.94 LBS | SYSTOLIC BLOOD PRESSURE: 109 MMHG | HEART RATE: 63 BPM | BODY MASS INDEX: 23.98 KG/M2

## 2018-01-01 VITALS — BODY MASS INDEX: 24.59 KG/M2 | TEMPERATURE: 99 F | HEIGHT: 69 IN | SYSTOLIC BLOOD PRESSURE: 81 MMHG | WEIGHT: 166 LBS

## 2018-01-01 DIAGNOSIS — K92.2 GASTROINTESTINAL HEMORRHAGE, UNSPECIFIED GASTROINTESTINAL HEMORRHAGE TYPE: Primary | ICD-10-CM

## 2018-01-01 DIAGNOSIS — Z95.811 LVAD (LEFT VENTRICULAR ASSIST DEVICE) PRESENT: ICD-10-CM

## 2018-01-01 DIAGNOSIS — I50.22 CHRONIC SYSTOLIC CONGESTIVE HEART FAILURE: ICD-10-CM

## 2018-01-01 DIAGNOSIS — Z79.01 CHRONIC ANTICOAGULATION: ICD-10-CM

## 2018-01-01 DIAGNOSIS — D50.0 IRON DEFICIENCY ANEMIA DUE TO CHRONIC BLOOD LOSS: ICD-10-CM

## 2018-01-01 DIAGNOSIS — Z79.01 ANTICOAGULATION MONITORING, INR RANGE 2-3: ICD-10-CM

## 2018-01-01 DIAGNOSIS — Z98.890 S/P EXPLORATORY LAPAROTOMY: Primary | ICD-10-CM

## 2018-01-01 DIAGNOSIS — D62 ACUTE BLOOD LOSS ANEMIA: ICD-10-CM

## 2018-01-01 DIAGNOSIS — Z79.899 LONG TERM CURRENT USE OF AMIODARONE: ICD-10-CM

## 2018-01-01 DIAGNOSIS — I48.19 PERSISTENT ATRIAL FIBRILLATION: ICD-10-CM

## 2018-01-01 DIAGNOSIS — Z95.811 HEART REPLACED BY HEART ASSIST DEVICE: ICD-10-CM

## 2018-01-01 DIAGNOSIS — T82.198D INAPPROPRIATE DISCHARGE OF IMPLANTABLE CARDIOVERTER-DEFIBRILLATOR (ICD), SUBSEQUENT ENCOUNTER: ICD-10-CM

## 2018-01-01 DIAGNOSIS — R76.8 HEPATITIS B CORE ANTIBODY POSITIVE: ICD-10-CM

## 2018-01-01 DIAGNOSIS — A41.9 SEVERE SEPSIS: ICD-10-CM

## 2018-01-01 DIAGNOSIS — Z95.810 CARDIAC DEFIBRILLATOR IN SITU: ICD-10-CM

## 2018-01-01 DIAGNOSIS — E78.00 PURE HYPERCHOLESTEROLEMIA: ICD-10-CM

## 2018-01-01 DIAGNOSIS — E78.00 PURE HYPERCHOLESTEROLEMIA: Primary | ICD-10-CM

## 2018-01-01 DIAGNOSIS — Z95.811 HEART REPLACED BY HEART ASSIST DEVICE: Primary | ICD-10-CM

## 2018-01-01 DIAGNOSIS — I50.9 CHF (CONGESTIVE HEART FAILURE): ICD-10-CM

## 2018-01-01 DIAGNOSIS — K92.2 ACUTE GI BLEEDING: ICD-10-CM

## 2018-01-01 DIAGNOSIS — K92.1 MELENA: ICD-10-CM

## 2018-01-01 DIAGNOSIS — D64.9 NORMOCYTIC ANEMIA: ICD-10-CM

## 2018-01-01 DIAGNOSIS — I48.3 TYPICAL ATRIAL FLUTTER: Primary | ICD-10-CM

## 2018-01-01 DIAGNOSIS — Z00.6 EXAMINATION OF PARTICIPANT IN CLINICAL TRIAL: ICD-10-CM

## 2018-01-01 DIAGNOSIS — R65.20 SEVERE SEPSIS: ICD-10-CM

## 2018-01-01 DIAGNOSIS — Z76.82 ORGAN TRANSPLANT CANDIDATE: ICD-10-CM

## 2018-01-01 DIAGNOSIS — I50.42 CHRONIC COMBINED SYSTOLIC AND DIASTOLIC CONGESTIVE HEART FAILURE: ICD-10-CM

## 2018-01-01 DIAGNOSIS — I10 ESSENTIAL HYPERTENSION: ICD-10-CM

## 2018-01-01 DIAGNOSIS — I42.8 NONISCHEMIC CARDIOMYOPATHY: ICD-10-CM

## 2018-01-01 DIAGNOSIS — Z87.898 HISTORY OF ADVERSE EFFECT OF VENOUS THROMBOEMBOLISM (VTE) PROPHYLAXIS: ICD-10-CM

## 2018-01-01 DIAGNOSIS — Z95.810 AICD (AUTOMATIC CARDIOVERTER/DEFIBRILLATOR) PRESENT: ICD-10-CM

## 2018-01-01 DIAGNOSIS — B95.7 STAPHYLOCOCCUS EPIDERMIDIS BACTEREMIA: Primary | ICD-10-CM

## 2018-01-01 DIAGNOSIS — I47.20 VENTRICULAR TACHYARRHYTHMIA: ICD-10-CM

## 2018-01-01 DIAGNOSIS — I42.8 NONISCHEMIC CARDIOMYOPATHY: Primary | ICD-10-CM

## 2018-01-01 DIAGNOSIS — I47.19 ATRIAL TACHYCARDIA: Chronic | ICD-10-CM

## 2018-01-01 DIAGNOSIS — Z95.811 LVAD (LEFT VENTRICULAR ASSIST DEVICE) PRESENT: Primary | ICD-10-CM

## 2018-01-01 DIAGNOSIS — I42.9 CARDIOMYOPATHY: ICD-10-CM

## 2018-01-01 DIAGNOSIS — I50.22 CHRONIC SYSTOLIC HEART FAILURE: ICD-10-CM

## 2018-01-01 DIAGNOSIS — J90 PLEURAL EFFUSION: ICD-10-CM

## 2018-01-01 DIAGNOSIS — I47.20 V-TACH: ICD-10-CM

## 2018-01-01 DIAGNOSIS — I50.9 HEART FAILURE, UNSPECIFIED HF CHRONICITY, UNSPECIFIED HEART FAILURE TYPE: ICD-10-CM

## 2018-01-01 DIAGNOSIS — E87.6 DIURETIC-INDUCED HYPOKALEMIA: ICD-10-CM

## 2018-01-01 DIAGNOSIS — K92.2 GI BLEED: ICD-10-CM

## 2018-01-01 DIAGNOSIS — I48.0 PAROXYSMAL ATRIAL FIBRILLATION: ICD-10-CM

## 2018-01-01 DIAGNOSIS — T82.9XXD COMPLICATION INVOLVING LEFT VENTRICULAR ASSIST DEVICE (LVAD), SUBSEQUENT ENCOUNTER: ICD-10-CM

## 2018-01-01 DIAGNOSIS — B95.62 MRSA BACTEREMIA: Primary | ICD-10-CM

## 2018-01-01 DIAGNOSIS — R79.1 SUPRATHERAPEUTIC INR: Primary | ICD-10-CM

## 2018-01-01 DIAGNOSIS — Z95.810 AICD (AUTOMATIC CARDIOVERTER/DEFIBRILLATOR) PRESENT: Primary | ICD-10-CM

## 2018-01-01 DIAGNOSIS — K92.2 GASTROINTESTINAL HEMORRHAGE, UNSPECIFIED GASTROINTESTINAL HEMORRHAGE TYPE: ICD-10-CM

## 2018-01-01 DIAGNOSIS — T50.2X5A DIURETIC-INDUCED HYPOKALEMIA: ICD-10-CM

## 2018-01-01 DIAGNOSIS — R06.02 SOB (SHORTNESS OF BREATH): ICD-10-CM

## 2018-01-01 DIAGNOSIS — I48.92 ATRIAL FLUTTER, UNSPECIFIED TYPE: Primary | ICD-10-CM

## 2018-01-01 DIAGNOSIS — B99.9 INFECTION: ICD-10-CM

## 2018-01-01 DIAGNOSIS — Z72.0 NICOTINE ABUSE: ICD-10-CM

## 2018-01-01 DIAGNOSIS — D64.9 SYMPTOMATIC ANEMIA: Primary | ICD-10-CM

## 2018-01-01 DIAGNOSIS — Z76.82 HEART TRANSPLANT CANDIDATE: ICD-10-CM

## 2018-01-01 DIAGNOSIS — R11.2 NON-INTRACTABLE VOMITING WITH NAUSEA, UNSPECIFIED VOMITING TYPE: ICD-10-CM

## 2018-01-01 DIAGNOSIS — E78.5 HYPERLIPIDEMIA, UNSPECIFIED HYPERLIPIDEMIA TYPE: ICD-10-CM

## 2018-01-01 DIAGNOSIS — I50.9 OTHER CONGESTIVE HEART FAILURE: ICD-10-CM

## 2018-01-01 DIAGNOSIS — I49.8 BRADYARRHYTHMIA: ICD-10-CM

## 2018-01-01 DIAGNOSIS — I95.2 HYPOTENSION DUE TO DRUGS: ICD-10-CM

## 2018-01-01 DIAGNOSIS — B95.7 STAPHYLOCOCCUS EPIDERMIDIS BACTEREMIA: ICD-10-CM

## 2018-01-01 DIAGNOSIS — I42.8 OTHER CARDIOMYOPATHIES: ICD-10-CM

## 2018-01-01 DIAGNOSIS — R07.9 CHEST PAIN: Primary | ICD-10-CM

## 2018-01-01 DIAGNOSIS — D64.9 ANEMIA: ICD-10-CM

## 2018-01-01 DIAGNOSIS — I49.9 CARDIAC ARRHYTHMIA, UNSPECIFIED CARDIAC ARRHYTHMIA TYPE: ICD-10-CM

## 2018-01-01 DIAGNOSIS — I47.20 VT (VENTRICULAR TACHYCARDIA): ICD-10-CM

## 2018-01-01 DIAGNOSIS — I48.3 TYPICAL ATRIAL FLUTTER: ICD-10-CM

## 2018-01-01 DIAGNOSIS — D64.9 ANEMIA, UNSPECIFIED TYPE: ICD-10-CM

## 2018-01-01 DIAGNOSIS — R78.81 BACTEREMIA: ICD-10-CM

## 2018-01-01 DIAGNOSIS — R78.81 STAPHYLOCOCCUS EPIDERMIDIS BACTEREMIA: ICD-10-CM

## 2018-01-01 DIAGNOSIS — R78.81 BACTEREMIA DUE TO STAPHYLOCOCCUS: ICD-10-CM

## 2018-01-01 DIAGNOSIS — K63.3 ILEUM ULCER: ICD-10-CM

## 2018-01-01 DIAGNOSIS — R20.2 TINGLING IN EXTREMITIES: ICD-10-CM

## 2018-01-01 DIAGNOSIS — R07.9 CHEST PAIN: ICD-10-CM

## 2018-01-01 DIAGNOSIS — N17.9 AKI (ACUTE KIDNEY INJURY): ICD-10-CM

## 2018-01-01 DIAGNOSIS — I50.9 CONGESTIVE HEART FAILURE, UNSPECIFIED CONGESTIVE HEART FAILURE CHRONICITY, UNSPECIFIED CONGESTIVE HEART FAILURE TYPE: ICD-10-CM

## 2018-01-01 DIAGNOSIS — Z45.02 ICD (IMPLANTABLE CARDIOVERTER-DEFIBRILLATOR) DISCHARGE: ICD-10-CM

## 2018-01-01 DIAGNOSIS — R53.1 WEAKNESS: ICD-10-CM

## 2018-01-01 DIAGNOSIS — Z79.899 LONG TERM CURRENT USE OF AMIODARONE: Primary | ICD-10-CM

## 2018-01-01 DIAGNOSIS — R79.1 SUBTHERAPEUTIC INTERNATIONAL NORMALIZED RATIO (INR): ICD-10-CM

## 2018-01-01 DIAGNOSIS — I48.92 ATRIAL FLUTTER, PAROXYSMAL: ICD-10-CM

## 2018-01-01 DIAGNOSIS — R78.81 STAPHYLOCOCCUS EPIDERMIDIS BACTEREMIA: Primary | ICD-10-CM

## 2018-01-01 DIAGNOSIS — I48.92 ATRIAL FLUTTER, UNSPECIFIED TYPE: ICD-10-CM

## 2018-01-01 DIAGNOSIS — R07.9 CHEST PAIN, UNSPECIFIED TYPE: ICD-10-CM

## 2018-01-01 DIAGNOSIS — I25.10 CORONARY ATHEROSCLEROSIS OF NATIVE CORONARY ARTERY: Primary | ICD-10-CM

## 2018-01-01 DIAGNOSIS — R79.1 ELEVATED INR: ICD-10-CM

## 2018-01-01 DIAGNOSIS — I47.19 ATRIAL PAROXYSMAL TACHYCARDIA: ICD-10-CM

## 2018-01-01 DIAGNOSIS — I50.9 HEART FAILURE: ICD-10-CM

## 2018-01-01 DIAGNOSIS — R79.1 SUPRATHERAPEUTIC INR: ICD-10-CM

## 2018-01-01 DIAGNOSIS — I47.20 V-TACH: Primary | ICD-10-CM

## 2018-01-01 DIAGNOSIS — Z23 NEED FOR HEPATITIS A VACCINATION: ICD-10-CM

## 2018-01-01 DIAGNOSIS — I50.9 HEART FAILURE, UNSPECIFIED HEART FAILURE CHRONICITY, UNSPECIFIED HEART FAILURE TYPE: ICD-10-CM

## 2018-01-01 DIAGNOSIS — Z95.810 PRESENCE OF AUTOMATIC IMPLANTABLE CARDIOVERTER-DEFIBRILLATOR: ICD-10-CM

## 2018-01-01 DIAGNOSIS — R74.01 TRANSAMINITIS: ICD-10-CM

## 2018-01-01 DIAGNOSIS — T82.9XXA COMPLICATION INVOLVING LEFT VENTRICULAR ASSIST DEVICE (LVAD), INITIAL ENCOUNTER: ICD-10-CM

## 2018-01-01 DIAGNOSIS — D64.9 ANEMIA, UNSPECIFIED TYPE: Primary | ICD-10-CM

## 2018-01-01 DIAGNOSIS — I50.43 ACUTE ON CHRONIC COMBINED SYSTOLIC AND DIASTOLIC HEART FAILURE: ICD-10-CM

## 2018-01-01 DIAGNOSIS — T82.837D HEMATOMA OF IMPLANTABLE CARDIOVERTER-DEFIBRILLATOR (ICD) POCKET, SUBSEQUENT ENCOUNTER: ICD-10-CM

## 2018-01-01 DIAGNOSIS — K92.1 GASTROINTESTINAL HEMORRHAGE WITH MELENA: ICD-10-CM

## 2018-01-01 DIAGNOSIS — I48.20 CHRONIC ATRIAL FIBRILLATION: ICD-10-CM

## 2018-01-01 DIAGNOSIS — K59.00 CONSTIPATION, UNSPECIFIED CONSTIPATION TYPE: ICD-10-CM

## 2018-01-01 DIAGNOSIS — D62 ACUTE POSTHEMORRHAGIC ANEMIA: ICD-10-CM

## 2018-01-01 DIAGNOSIS — R97.20 ELEVATED PSA: ICD-10-CM

## 2018-01-01 DIAGNOSIS — D50.0 IRON DEFICIENCY ANEMIA DUE TO CHRONIC BLOOD LOSS: Primary | ICD-10-CM

## 2018-01-01 DIAGNOSIS — T82.9XXA LEFT VENTRICULAR ASSIST DEVICE (LVAD) COMPLICATION: ICD-10-CM

## 2018-01-01 DIAGNOSIS — B95.8 BACTEREMIA DUE TO STAPHYLOCOCCUS: ICD-10-CM

## 2018-01-01 DIAGNOSIS — I50.9 CONGESTIVE HEART FAILURE: ICD-10-CM

## 2018-01-01 DIAGNOSIS — Z95.810 ICD (IMPLANTABLE CARDIOVERTER-DEFIBRILLATOR) IN PLACE: Primary | ICD-10-CM

## 2018-01-01 DIAGNOSIS — I50.9 HEART FAILURE, UNSPECIFIED HEART FAILURE CHRONICITY, UNSPECIFIED HEART FAILURE TYPE: Primary | ICD-10-CM

## 2018-01-01 DIAGNOSIS — I50.22 CHRONIC SYSTOLIC (CONGESTIVE) HEART FAILURE: ICD-10-CM

## 2018-01-01 DIAGNOSIS — Z23 NEED FOR 23-POLYVALENT PNEUMOCOCCAL POLYSACCHARIDE VACCINE: ICD-10-CM

## 2018-01-01 DIAGNOSIS — I50.42 CHRONIC COMBINED SYSTOLIC AND DIASTOLIC CONGESTIVE HEART FAILURE: Primary | ICD-10-CM

## 2018-01-01 DIAGNOSIS — N49.2 SCROTUM, ABSCESS: ICD-10-CM

## 2018-01-01 DIAGNOSIS — R78.81 MRSA BACTEREMIA: Primary | ICD-10-CM

## 2018-01-01 LAB
ABO + RH BLD: NORMAL
ACANTHOCYTES BLD QL SMEAR: PRESENT
ALBUMIN SERPL BCP-MCNC: 1.6 G/DL
ALBUMIN SERPL BCP-MCNC: 2 G/DL
ALBUMIN SERPL BCP-MCNC: 2.1 G/DL
ALBUMIN SERPL BCP-MCNC: 2.1 G/DL
ALBUMIN SERPL BCP-MCNC: 2.2 G/DL
ALBUMIN SERPL BCP-MCNC: 2.3 G/DL
ALBUMIN SERPL BCP-MCNC: 2.4 G/DL
ALBUMIN SERPL BCP-MCNC: 2.5 G/DL
ALBUMIN SERPL BCP-MCNC: 2.6 G/DL
ALBUMIN SERPL BCP-MCNC: 2.7 G/DL
ALBUMIN SERPL BCP-MCNC: 2.7 G/DL
ALBUMIN SERPL BCP-MCNC: 2.8 G/DL
ALBUMIN SERPL BCP-MCNC: 2.9 G/DL
ALBUMIN SERPL BCP-MCNC: 3 G/DL
ALBUMIN SERPL BCP-MCNC: 3.1 G/DL
ALBUMIN SERPL BCP-MCNC: 3.2 G/DL
ALBUMIN SERPL BCP-MCNC: 3.3 G/DL
ALBUMIN SERPL BCP-MCNC: 3.4 G/DL
ALBUMIN SERPL BCP-MCNC: 3.6 G/DL
ALBUMIN SERPL BCP-MCNC: 3.8 G/DL
ALBUMIN SERPL BCP-MCNC: 3.9 G/DL
ALBUMIN SERPL BCP-MCNC: 4 G/DL
ALBUMIN SERPL BCP-MCNC: 4.2 G/DL
ALBUMIN: 2.8
ALLENS TEST: ABNORMAL
ALP SERPL-CCNC: 100 U/L
ALP SERPL-CCNC: 102 U/L
ALP SERPL-CCNC: 105 U/L
ALP SERPL-CCNC: 127 U/L
ALP SERPL-CCNC: 39 U/L
ALP SERPL-CCNC: 42 U/L
ALP SERPL-CCNC: 42 U/L
ALP SERPL-CCNC: 43 U/L
ALP SERPL-CCNC: 48 U/L
ALP SERPL-CCNC: 48 U/L
ALP SERPL-CCNC: 49 U/L
ALP SERPL-CCNC: 49 U/L
ALP SERPL-CCNC: 50 U/L
ALP SERPL-CCNC: 51 U/L
ALP SERPL-CCNC: 52 U/L
ALP SERPL-CCNC: 53 U/L
ALP SERPL-CCNC: 55 U/L
ALP SERPL-CCNC: 58 U/L
ALP SERPL-CCNC: 59 U/L
ALP SERPL-CCNC: 59 U/L
ALP SERPL-CCNC: 61 U/L
ALP SERPL-CCNC: 62 U/L
ALP SERPL-CCNC: 62 U/L
ALP SERPL-CCNC: 63 U/L
ALP SERPL-CCNC: 64 U/L
ALP SERPL-CCNC: 64 U/L
ALP SERPL-CCNC: 65 U/L
ALP SERPL-CCNC: 66 U/L
ALP SERPL-CCNC: 67 U/L
ALP SERPL-CCNC: 68 U/L
ALP SERPL-CCNC: 69 U/L
ALP SERPL-CCNC: 70 U/L
ALP SERPL-CCNC: 71 U/L
ALP SERPL-CCNC: 72 U/L
ALP SERPL-CCNC: 75 U/L
ALP SERPL-CCNC: 75 U/L
ALP SERPL-CCNC: 76 U/L
ALP SERPL-CCNC: 76 U/L
ALP SERPL-CCNC: 77 U/L
ALP SERPL-CCNC: 80 U/L
ALP SERPL-CCNC: 81 U/L
ALP SERPL-CCNC: 83 U/L
ALP SERPL-CCNC: 87 U/L
ALP SERPL-CCNC: 89 U/L
ALP SERPL-CCNC: 93 U/L
ALP SERPL-CCNC: 99 U/L
ALT SERPL W/O P-5'-P-CCNC: 10 U/L
ALT SERPL W/O P-5'-P-CCNC: 100 U/L
ALT SERPL W/O P-5'-P-CCNC: 105 U/L
ALT SERPL W/O P-5'-P-CCNC: 12 U/L
ALT SERPL W/O P-5'-P-CCNC: 13 U/L
ALT SERPL W/O P-5'-P-CCNC: 14 U/L
ALT SERPL W/O P-5'-P-CCNC: 15 U/L
ALT SERPL W/O P-5'-P-CCNC: 16 U/L
ALT SERPL W/O P-5'-P-CCNC: 17 U/L
ALT SERPL W/O P-5'-P-CCNC: 19 U/L
ALT SERPL W/O P-5'-P-CCNC: 20 U/L
ALT SERPL W/O P-5'-P-CCNC: 21 U/L
ALT SERPL W/O P-5'-P-CCNC: 21 U/L
ALT SERPL W/O P-5'-P-CCNC: 22 U/L
ALT SERPL W/O P-5'-P-CCNC: 23 U/L
ALT SERPL W/O P-5'-P-CCNC: 24 U/L
ALT SERPL W/O P-5'-P-CCNC: 25 U/L
ALT SERPL W/O P-5'-P-CCNC: 30 U/L
ALT SERPL W/O P-5'-P-CCNC: 31 U/L
ALT SERPL W/O P-5'-P-CCNC: 39 U/L
ALT SERPL W/O P-5'-P-CCNC: 6 U/L
ALT SERPL W/O P-5'-P-CCNC: 67 U/L
ALT SERPL W/O P-5'-P-CCNC: 73 U/L
ALT SERPL W/O P-5'-P-CCNC: 8 U/L
ALT SERPL W/O P-5'-P-CCNC: 80 U/L
ALT SERPL W/O P-5'-P-CCNC: 82 U/L
ALT SERPL W/O P-5'-P-CCNC: 84 U/L
ALT SERPL W/O P-5'-P-CCNC: 84 U/L
ALT SERPL W/O P-5'-P-CCNC: 87 U/L
ALT SERPL W/O P-5'-P-CCNC: 9 U/L
ALT SERPL W/O P-5'-P-CCNC: 9 U/L
ALT SERPL W/O P-5'-P-CCNC: 91 U/L
ALT SERPL W/O P-5'-P-CCNC: 91 U/L
ALT SERPL W/O P-5'-P-CCNC: 99 U/L
ALT SERPL-CCNC: 14 U/L
AMIODARONE FLD-MCNC: 0.5 UG/ML (ref 1–3)
AMIODARONE FLD-MCNC: 0.8 UG/ML (ref 1–3)
AMIODARONE FLD-MCNC: 1.8 UG/ML (ref 1–3)
ANION GAP SERPL CALC-SCNC: 10 MMOL/L
ANION GAP SERPL CALC-SCNC: 11 MMOL/L
ANION GAP SERPL CALC-SCNC: 12 MMOL/L
ANION GAP SERPL CALC-SCNC: 13 MMOL/L
ANION GAP SERPL CALC-SCNC: 13 MMOL/L
ANION GAP SERPL CALC-SCNC: 15 MMOL/L
ANION GAP SERPL CALC-SCNC: 17 MMOL/L
ANION GAP SERPL CALC-SCNC: 2 MMOL/L
ANION GAP SERPL CALC-SCNC: 3 MMOL/L
ANION GAP SERPL CALC-SCNC: 4 MMOL/L
ANION GAP SERPL CALC-SCNC: 5 MMOL/L
ANION GAP SERPL CALC-SCNC: 6 MMOL/L
ANION GAP SERPL CALC-SCNC: 7 MMOL/L
ANION GAP SERPL CALC-SCNC: 8 MMOL/L
ANION GAP SERPL CALC-SCNC: 9 MMOL/L
ANISOCYTOSIS BLD QL SMEAR: ABNORMAL
ANISOCYTOSIS BLD QL SMEAR: SLIGHT
AORTIC ATHEROMA: YES
AORTIC ROOT ANNULUS: 3.4 CM
APTT BLDCRRT: 23.2 SEC
APTT BLDCRRT: 23.2 SEC
APTT BLDCRRT: 23.4 SEC
APTT BLDCRRT: 23.8 SEC
APTT BLDCRRT: 23.8 SEC
APTT BLDCRRT: 23.9 SEC
APTT BLDCRRT: 24 SEC
APTT BLDCRRT: 24.2 SEC
APTT BLDCRRT: 24.4 SEC
APTT BLDCRRT: 24.4 SEC
APTT BLDCRRT: 24.6 SEC
APTT BLDCRRT: 24.7 SEC
APTT BLDCRRT: 24.7 SEC
APTT BLDCRRT: 24.9 SEC
APTT BLDCRRT: 25.2 SEC
APTT BLDCRRT: 25.2 SEC
APTT BLDCRRT: 25.3 SEC
APTT BLDCRRT: 25.3 SEC
APTT BLDCRRT: 25.6 SEC
APTT BLDCRRT: 25.7 SEC
APTT BLDCRRT: 25.8 SEC
APTT BLDCRRT: 25.9 SEC
APTT BLDCRRT: 26.2 SEC
APTT BLDCRRT: 26.6 SEC
APTT BLDCRRT: 26.6 SEC
APTT BLDCRRT: 26.7 SEC
APTT BLDCRRT: 26.7 SEC
APTT BLDCRRT: 26.9 SEC
APTT BLDCRRT: 27 SEC
APTT BLDCRRT: 27.1 SEC
APTT BLDCRRT: 27.2 SEC
APTT BLDCRRT: 27.2 SEC
APTT BLDCRRT: 27.3 SEC
APTT BLDCRRT: 27.4 SEC
APTT BLDCRRT: 27.6 SEC
APTT BLDCRRT: 27.8 SEC
APTT BLDCRRT: 27.9 SEC
APTT BLDCRRT: 27.9 SEC
APTT BLDCRRT: 28.1 SEC
APTT BLDCRRT: 28.2 SEC
APTT BLDCRRT: 28.3 SEC
APTT BLDCRRT: 28.6 SEC
APTT BLDCRRT: 28.9 SEC
APTT BLDCRRT: 29.4 SEC
APTT BLDCRRT: 29.8 SEC
APTT BLDCRRT: 29.9 SEC
APTT BLDCRRT: 30 SEC
APTT BLDCRRT: 30 SEC
APTT BLDCRRT: 30.6 SEC
APTT BLDCRRT: 31.1 SEC
APTT BLDCRRT: 31.2 SEC
APTT BLDCRRT: 31.2 SEC
APTT BLDCRRT: 31.3 SEC
APTT BLDCRRT: 31.8 SEC
APTT BLDCRRT: 32.4 SEC
APTT BLDCRRT: 32.4 SEC
APTT BLDCRRT: 32.5 SEC
APTT BLDCRRT: 33.3 SEC
APTT BLDCRRT: 33.3 SEC
APTT BLDCRRT: 33.6 SEC
APTT BLDCRRT: 33.6 SEC
APTT BLDCRRT: 34.1 SEC
APTT BLDCRRT: 34.5 SEC
APTT BLDCRRT: 34.9 SEC
APTT BLDCRRT: 35.2 SEC
APTT BLDCRRT: 35.7 SEC
APTT BLDCRRT: 36.1 SEC
APTT BLDCRRT: 36.1 SEC
APTT BLDCRRT: 36.6 SEC
APTT BLDCRRT: 36.7 SEC
APTT BLDCRRT: 36.8 SEC
APTT BLDCRRT: 37.3 SEC
APTT BLDCRRT: 37.5 SEC
APTT BLDCRRT: 38.9 SEC
APTT BLDCRRT: 39.1 SEC
APTT BLDCRRT: 39.1 SEC
APTT BLDCRRT: 39.4 SEC
APTT BLDCRRT: 39.5 SEC
APTT BLDCRRT: 40 SEC
APTT BLDCRRT: 40.7 SEC
APTT BLDCRRT: 40.8 SEC
APTT BLDCRRT: 41.8 SEC
APTT BLDCRRT: 50 SEC
APTT BLDCRRT: 50.8 SEC
APTT BLDCRRT: 51.4 SEC
APTT BLDCRRT: 54.2 SEC
APTT BLDCRRT: 54.7 SEC
APTT BLDCRRT: 60.2 SEC
APTT BLDCRRT: 64.2 SEC
APTT BLDCRRT: 64.4 SEC
APTT BLDCRRT: 69.3 SEC
APTT BLDCRRT: 70.6 SEC
APTT BLDCRRT: 77.5 SEC
ASCENDING AORTA: 2.9 CM
ASCENDING AORTA: 3.41 CM
AST SERPL-CCNC: 104 U/L
AST SERPL-CCNC: 105 U/L
AST SERPL-CCNC: 106 U/L
AST SERPL-CCNC: 12 U/L
AST SERPL-CCNC: 13 U/L
AST SERPL-CCNC: 14 U/L
AST SERPL-CCNC: 142 U/L
AST SERPL-CCNC: 15 U/L
AST SERPL-CCNC: 151 U/L
AST SERPL-CCNC: 16 U/L
AST SERPL-CCNC: 165 U/L
AST SERPL-CCNC: 17 U/L
AST SERPL-CCNC: 173 U/L
AST SERPL-CCNC: 178 U/L
AST SERPL-CCNC: 18 U/L
AST SERPL-CCNC: 18 U/L
AST SERPL-CCNC: 19 U/L
AST SERPL-CCNC: 20 U/L
AST SERPL-CCNC: 22 U/L
AST SERPL-CCNC: 23 U/L
AST SERPL-CCNC: 24 U/L
AST SERPL-CCNC: 25 U/L
AST SERPL-CCNC: 26 U/L
AST SERPL-CCNC: 27 U/L
AST SERPL-CCNC: 28 U/L
AST SERPL-CCNC: 30 U/L
AST SERPL-CCNC: 31 U/L
AST SERPL-CCNC: 32 U/L
AST SERPL-CCNC: 34 U/L
AST SERPL-CCNC: 38 U/L
AST SERPL-CCNC: 40 U/L
AST SERPL-CCNC: 91 U/L
AST SERPL-CCNC: 93 U/L
BACTERIA #/AREA URNS AUTO: ABNORMAL /HPF
BACTERIA BLD CULT: NORMAL
BACTERIA SPEC AEROBE CULT: NORMAL
BASO STIPL BLD QL SMEAR: ABNORMAL
BASO STIPL BLD QL SMEAR: ABNORMAL
BASOPHILS # BLD AUTO: 0.01 K/UL
BASOPHILS # BLD AUTO: 0.02 K/UL
BASOPHILS # BLD AUTO: 0.03 K/UL
BASOPHILS # BLD AUTO: 0.04 K/UL
BASOPHILS # BLD AUTO: 0.05 K/UL
BASOPHILS # BLD AUTO: 0.06 K/UL
BASOPHILS # BLD AUTO: 0.07 K/UL
BASOPHILS # BLD AUTO: 0.08 K/UL
BASOPHILS # BLD AUTO: 0.12 K/UL
BASOPHILS # BLD AUTO: 0.13 K/UL
BASOPHILS # BLD AUTO: ABNORMAL K/UL
BASOPHILS NFR BLD: 0 %
BASOPHILS NFR BLD: 0 %
BASOPHILS NFR BLD: 0.1 %
BASOPHILS NFR BLD: 0.2 %
BASOPHILS NFR BLD: 0.3 %
BASOPHILS NFR BLD: 0.4 %
BASOPHILS NFR BLD: 0.5 %
BASOPHILS NFR BLD: 0.6 %
BASOPHILS NFR BLD: 0.7 %
BASOPHILS NFR BLD: 0.8 %
BASOPHILS NFR BLD: 0.9 %
BASOPHILS NFR BLD: 1 %
BASOPHILS NFR BLD: 1.1 %
BASOPHILS NFR BLD: 1.4 %
BILIRUB DIRECT SERPL-MCNC: 0.2 MG/DL
BILIRUB DIRECT SERPL-MCNC: 0.3 MG/DL
BILIRUB DIRECT SERPL-MCNC: 0.4 MG/DL
BILIRUB DIRECT SERPL-MCNC: 0.4 MG/DL
BILIRUB DIRECT SERPL-MCNC: 0.5 MG/DL
BILIRUB DIRECT SERPL-MCNC: 0.6 MG/DL
BILIRUB DIRECT SERPL-MCNC: 0.7 MG/DL
BILIRUB DIRECT SERPL-MCNC: 0.7 MG/DL
BILIRUB DIRECT SERPL-MCNC: 0.8 MG/DL
BILIRUB DIRECT SERPL-MCNC: 0.8 MG/DL
BILIRUB DIRECT SERPL-MCNC: 1.1 MG/DL
BILIRUB DIRECT SERPL-MCNC: 1.1 MG/DL
BILIRUB DIRECT SERPL-MCNC: 1.2 MG/DL
BILIRUB DIRECT SERPL-MCNC: 1.4 MG/DL
BILIRUB DIRECT SERPL-MCNC: 1.7 MG/DL
BILIRUB SERPL-MCNC: 0.2 MG/DL
BILIRUB SERPL-MCNC: 0.3 MG/DL
BILIRUB SERPL-MCNC: 0.4 MG/DL
BILIRUB SERPL-MCNC: 0.5 MG/DL
BILIRUB SERPL-MCNC: 0.6 MG/DL
BILIRUB SERPL-MCNC: 0.7 MG/DL
BILIRUB SERPL-MCNC: 0.8 MG/DL
BILIRUB SERPL-MCNC: 0.9 MG/DL
BILIRUB SERPL-MCNC: 1 MG/DL
BILIRUB SERPL-MCNC: 1 MG/DL
BILIRUB SERPL-MCNC: 1.2 MG/DL
BILIRUB SERPL-MCNC: 1.2 MG/DL
BILIRUB SERPL-MCNC: 1.3 MG/DL
BILIRUB SERPL-MCNC: 1.4 MG/DL
BILIRUB SERPL-MCNC: 1.4 MG/DL
BILIRUB SERPL-MCNC: 1.5 MG/DL
BILIRUB SERPL-MCNC: 1.5 MG/DL
BILIRUB SERPL-MCNC: 1.8 MG/DL
BILIRUB SERPL-MCNC: 1.8 MG/DL
BILIRUB SERPL-MCNC: 1.9 MG/DL
BILIRUB SERPL-MCNC: 2.2 MG/DL
BILIRUB SERPL-MCNC: 2.3 MG/DL
BILIRUB UR QL STRIP: NEGATIVE
BILIRUBIN, TOTAL: 0.3
BLD GP AB SCN CELLS X3 SERPL QL: NORMAL
BLD PROD TYP BPU: NORMAL
BLOOD UNIT EXPIRATION DATE: NORMAL
BLOOD UNIT TYPE CODE: 5100
BLOOD UNIT TYPE: NORMAL
BNP SERPL-MCNC: 1005 PG/ML
BNP SERPL-MCNC: 1077 PG/ML
BNP SERPL-MCNC: 109 PG/ML
BNP SERPL-MCNC: 1204 PG/ML
BNP SERPL-MCNC: 123 PG/ML
BNP SERPL-MCNC: 126 PG/ML
BNP SERPL-MCNC: 141 PG/ML
BNP SERPL-MCNC: 142 PG/ML
BNP SERPL-MCNC: 1430 PG/ML
BNP SERPL-MCNC: 1430 PG/ML
BNP SERPL-MCNC: 1512 PG/ML
BNP SERPL-MCNC: 153 PG/ML
BNP SERPL-MCNC: 158 PG/ML
BNP SERPL-MCNC: 167 PG/ML
BNP SERPL-MCNC: 180 PG/ML
BNP SERPL-MCNC: 185 PG/ML
BNP SERPL-MCNC: 186 PG/ML
BNP SERPL-MCNC: 195 PG/ML
BNP SERPL-MCNC: 197 PG/ML
BNP SERPL-MCNC: 199 PG/ML
BNP SERPL-MCNC: 200 PG/ML
BNP SERPL-MCNC: 217 PG/ML
BNP SERPL-MCNC: 224 PG/ML
BNP SERPL-MCNC: 234 PG/ML
BNP SERPL-MCNC: 241 PG/ML
BNP SERPL-MCNC: 242 PG/ML
BNP SERPL-MCNC: 253 PG/ML
BNP SERPL-MCNC: 254 PG/ML
BNP SERPL-MCNC: 303 PG/ML
BNP SERPL-MCNC: 304 PG/ML
BNP SERPL-MCNC: 305 PG/ML
BNP SERPL-MCNC: 308 PG/ML
BNP SERPL-MCNC: 310 PG/ML
BNP SERPL-MCNC: 327 PG/ML
BNP SERPL-MCNC: 328 PG/ML
BNP SERPL-MCNC: 365 PG/ML
BNP SERPL-MCNC: 368 PG/ML
BNP SERPL-MCNC: 386 PG/ML
BNP SERPL-MCNC: 428 PG/ML
BNP SERPL-MCNC: 441 PG/ML
BNP SERPL-MCNC: 531 PG/ML
BNP SERPL-MCNC: 539 PG/ML
BNP SERPL-MCNC: 543 PG/ML
BNP SERPL-MCNC: 574 PG/ML
BNP SERPL-MCNC: 617 PG/ML
BNP SERPL-MCNC: 645 PG/ML
BNP SERPL-MCNC: 685 PG/ML
BNP SERPL-MCNC: 699 PG/ML
BNP SERPL-MCNC: 719 PG/ML
BNP SERPL-MCNC: 849 PG/ML
BNP SERPL-MCNC: 865 PG/ML
BNP SERPL-MCNC: 917 PG/ML
BNP SERPL-MCNC: 937 PG/ML
BSA FOR ECHO PROCEDURE: 1.93 M2
BSA FOR ECHO PROCEDURE: 1.95 M2
BUN BLD-MCNC: 18 MG/DL (ref 4–21)
BUN SERPL-MCNC: 10 MG/DL
BUN SERPL-MCNC: 11 MG/DL
BUN SERPL-MCNC: 12 MG/DL
BUN SERPL-MCNC: 12 MG/DL
BUN SERPL-MCNC: 13 MG/DL
BUN SERPL-MCNC: 14 MG/DL
BUN SERPL-MCNC: 15 MG/DL
BUN SERPL-MCNC: 16 MG/DL
BUN SERPL-MCNC: 17 MG/DL
BUN SERPL-MCNC: 18 MG/DL
BUN SERPL-MCNC: 19 MG/DL
BUN SERPL-MCNC: 20 MG/DL
BUN SERPL-MCNC: 21 MG/DL
BUN SERPL-MCNC: 22 MG/DL
BUN SERPL-MCNC: 23 MG/DL
BUN SERPL-MCNC: 24 MG/DL
BUN SERPL-MCNC: 25 MG/DL
BUN SERPL-MCNC: 26 MG/DL
BUN SERPL-MCNC: 27 MG/DL
BUN SERPL-MCNC: 28 MG/DL
BUN SERPL-MCNC: 30 MG/DL
BUN SERPL-MCNC: 30 MG/DL
BUN SERPL-MCNC: 32 MG/DL
BUN SERPL-MCNC: 34 MG/DL
BUN SERPL-MCNC: 34 MG/DL
BUN SERPL-MCNC: 39 MG/DL
BUN SERPL-MCNC: 39 MG/DL
BUN SERPL-MCNC: 41 MG/DL
BUN SERPL-MCNC: 46 MG/DL
BUN SERPL-MCNC: 47 MG/DL
BUN SERPL-MCNC: 47 MG/DL
BUN SERPL-MCNC: 48 MG/DL
BUN SERPL-MCNC: 59 MG/DL
BUN SERPL-MCNC: 61 MG/DL
BUN SERPL-MCNC: 62 MG/DL
BUN SERPL-MCNC: 63 MG/DL
BUN SERPL-MCNC: 69 MG/DL
BUN SERPL-MCNC: 71 MG/DL
BUN SERPL-MCNC: 9 MG/DL
BURR CELLS BLD QL SMEAR: ABNORMAL
CALCIUM SERPL-MCNC: 10 MG/DL
CALCIUM SERPL-MCNC: 10 MG/DL
CALCIUM SERPL-MCNC: 10.1 MG/DL
CALCIUM SERPL-MCNC: 10.1 MG/DL
CALCIUM SERPL-MCNC: 7 MG/DL
CALCIUM SERPL-MCNC: 7.8 MG/DL
CALCIUM SERPL-MCNC: 7.9 MG/DL
CALCIUM SERPL-MCNC: 7.9 MG/DL
CALCIUM SERPL-MCNC: 8 MG/DL
CALCIUM SERPL-MCNC: 8.1 MG/DL
CALCIUM SERPL-MCNC: 8.2 MG/DL
CALCIUM SERPL-MCNC: 8.3 MG/DL
CALCIUM SERPL-MCNC: 8.3 MG/DL
CALCIUM SERPL-MCNC: 8.4 MG/DL
CALCIUM SERPL-MCNC: 8.5 MG/DL
CALCIUM SERPL-MCNC: 8.6 MG/DL
CALCIUM SERPL-MCNC: 8.7 MG/DL
CALCIUM SERPL-MCNC: 8.8 MG/DL
CALCIUM SERPL-MCNC: 8.9 MG/DL
CALCIUM SERPL-MCNC: 8.9 MG/DL
CALCIUM SERPL-MCNC: 9 MG/DL
CALCIUM SERPL-MCNC: 9.1 MG/DL
CALCIUM SERPL-MCNC: 9.2 MG/DL
CALCIUM SERPL-MCNC: 9.3 MG/DL
CALCIUM SERPL-MCNC: 9.4 MG/DL
CALCIUM SERPL-MCNC: 9.5 MG/DL
CALCIUM SERPL-MCNC: 9.6 MG/DL
CALCIUM SERPL-MCNC: 9.7 MG/DL
CALCIUM SERPL-MCNC: 9.8 MG/DL
CALCIUM SERPL-MCNC: 9.9 MG/DL
CALCIUM SERPL-MCNC: 9.9 MG/DL
CHLORIDE SERPL-SCNC: 100 MMOL/L
CHLORIDE SERPL-SCNC: 101 MMOL/L
CHLORIDE SERPL-SCNC: 102 MMOL/L
CHLORIDE SERPL-SCNC: 103 MMOL/L
CHLORIDE SERPL-SCNC: 104 MMOL/L
CHLORIDE SERPL-SCNC: 105 MMOL/L
CHLORIDE SERPL-SCNC: 106 MMOL/L
CHLORIDE SERPL-SCNC: 107 MMOL/L
CHLORIDE SERPL-SCNC: 108 MMOL/L
CHLORIDE SERPL-SCNC: 109 MMOL/L
CHLORIDE SERPL-SCNC: 110 MMOL/L
CHLORIDE SERPL-SCNC: 110 MMOL/L
CHLORIDE SERPL-SCNC: 111 MMOL/L
CHLORIDE SERPL-SCNC: 112 MMOL/L
CHLORIDE SERPL-SCNC: 113 MMOL/L
CHLORIDE SERPL-SCNC: 114 MMOL/L
CHLORIDE SERPL-SCNC: 94 MMOL/L
CHLORIDE SERPL-SCNC: 96 MMOL/L
CHLORIDE SERPL-SCNC: 99 MMOL/L
CHLORIDE: 102
CHOLEST SERPL-MCNC: 128 MG/DL
CHOLEST SERPL-MCNC: 95 MG/DL
CHOLEST/HDLC SERPL: 2.1 {RATIO}
CK SERPL-CCNC: 139 U/L
CK SERPL-CCNC: 185 U/L
CK SERPL-CCNC: 27 U/L
CK SERPL-CCNC: 64 U/L
CK SERPL-CCNC: 69 U/L
CK SERPL-CCNC: 78 U/L
CLARITY UR REFRACT.AUTO: ABNORMAL
CLARITY UR REFRACT.AUTO: CLEAR
CLARITY UR: CLEAR
CO2 SERPL-SCNC: 13 MMOL/L
CO2 SERPL-SCNC: 13 MMOL/L
CO2 SERPL-SCNC: 14 MMOL/L
CO2 SERPL-SCNC: 15 MMOL/L
CO2 SERPL-SCNC: 16 MMOL/L
CO2 SERPL-SCNC: 17 MMOL/L
CO2 SERPL-SCNC: 18 MMOL/L
CO2 SERPL-SCNC: 18 MMOL/L
CO2 SERPL-SCNC: 19 MMOL/L
CO2 SERPL-SCNC: 20 MMOL/L
CO2 SERPL-SCNC: 21 MMOL/L
CO2 SERPL-SCNC: 22 MMOL/L
CO2 SERPL-SCNC: 23 MMOL/L
CO2 SERPL-SCNC: 24 MMOL/L
CO2 SERPL-SCNC: 25 MMOL/L
CO2 SERPL-SCNC: 26 MMOL/L
CO2 SERPL-SCNC: 27 MMOL/L
CO2 SERPL-SCNC: 28 MMOL/L
CO2 SERPL-SCNC: 29 MMOL/L
CO2 SERPL-SCNC: 29 MMOL/L
CO2 SERPL-SCNC: 30 MMOL/L
CODING SYSTEM: NORMAL
COLOR UR AUTO: ABNORMAL
COLOR UR AUTO: YELLOW
COLOR UR: YELLOW
COUMADIN MONITORING INR: 1.4
CREAT SERPL-MCNC: 0.7 MG/DL
CREAT SERPL-MCNC: 0.8 MG/DL
CREAT SERPL-MCNC: 0.9 MG/DL
CREAT SERPL-MCNC: 1 MG/DL
CREAT SERPL-MCNC: 1.1 MG/DL
CREAT SERPL-MCNC: 1.2 MG/DL
CREAT SERPL-MCNC: 1.3 MG/DL
CREAT SERPL-MCNC: 1.4 MG/DL
CREAT SERPL-MCNC: 1.5 MG/DL
CREAT SERPL-MCNC: 1.6 MG/DL
CREAT SERPL-MCNC: 1.6 MG/DL
CREAT SERPL-MCNC: 1.7 MG/DL
CREAT SERPL-MCNC: 1.9 MG/DL
CREAT SERPL-MCNC: 1.9 MG/DL
CREAT SERPL-MCNC: 2.3 MG/DL
CREAT SERPL-MCNC: 2.5 MG/DL
CREAT SERPL-MCNC: 2.9 MG/DL
CREAT SERPL-MCNC: 2.9 MG/DL
CREAT SERPL-MCNC: 3.3 MG/DL
CREAT SERPL-MCNC: 3.3 MG/DL
CREAT SERPL-MCNC: 3.4 MG/DL
CREAT SERPL-MCNC: 3.7 MG/DL
CRP SERPL-MCNC: 0.4 MG/L
CRP SERPL-MCNC: 0.5 MG/L
CRP SERPL-MCNC: 0.6 MG/L
CRP SERPL-MCNC: 0.7 MG/L
CRP SERPL-MCNC: 0.9 MG/L
CRP SERPL-MCNC: 1 MG/L
CRP SERPL-MCNC: 1.1 MG/L
CRP SERPL-MCNC: 1.1 MG/L
CRP SERPL-MCNC: 1.2 MG/L
CRP SERPL-MCNC: 1.2 MG/L
CRP SERPL-MCNC: 1.3 MG/L
CRP SERPL-MCNC: 1.6 MG/L
CRP SERPL-MCNC: 1.6 MG/L
CRP SERPL-MCNC: 1.7 MG/L
CRP SERPL-MCNC: 1.8 MG/L
CRP SERPL-MCNC: 121.4 MG/L
CRP SERPL-MCNC: 121.4 MG/L
CRP SERPL-MCNC: 13 MG/L
CRP SERPL-MCNC: 147.7 MG/L
CRP SERPL-MCNC: 181.5 MG/L
CRP SERPL-MCNC: 2.1 MG/L
CRP SERPL-MCNC: 2.1 MG/L
CRP SERPL-MCNC: 2.3 MG/L
CRP SERPL-MCNC: 2.3 MG/L
CRP SERPL-MCNC: 2.4 MG/L
CRP SERPL-MCNC: 2.5 MG/L
CRP SERPL-MCNC: 2.7 MG/L
CRP SERPL-MCNC: 2.7 MG/L
CRP SERPL-MCNC: 201.2 MG/L
CRP SERPL-MCNC: 25.1 MG/L
CRP SERPL-MCNC: 3 MG/L
CRP SERPL-MCNC: 3.2 MG/L
CRP SERPL-MCNC: 3.3 MG/DL
CRP SERPL-MCNC: 3.6 MG/L
CRP SERPL-MCNC: 5.1 MG/L
CRP SERPL-MCNC: 5.5 MG/L
CRP SERPL-MCNC: 56.1 MG/L
CRP SERPL-MCNC: 6.8 MG/L
CRP SERPL-MCNC: 8.6 MG/L
CV ECHO LV RWT: 0.22 CM
D DIMER PPP IA.FEU-MCNC: 0.59 MG/L FEU
DACRYOCYTES BLD QL SMEAR: ABNORMAL
DELSYS: ABNORMAL
DIFFERENTIAL METHOD: ABNORMAL
DISPENSE STATUS: NORMAL
DOP CALC LVOT AREA: 3.97 CM2
DOP CALC LVOT DIAMETER: 2.25 CM
E WAVE DECELERATION TIME: 196.6 MSEC
E/A RATIO: 0.84
E/E' RATIO: 13
ECHO LV POSTERIOR WALL: 0.6 CM (ref 0.6–1.1)
EOSINOPHIL # BLD AUTO: 0 K/UL
EOSINOPHIL # BLD AUTO: 0.1 K/UL
EOSINOPHIL # BLD AUTO: 0.2 K/UL
EOSINOPHIL # BLD AUTO: 0.3 K/UL
EOSINOPHIL # BLD AUTO: 0.4 K/UL
EOSINOPHIL # BLD AUTO: 0.5 K/UL
EOSINOPHIL # BLD AUTO: 0.6 K/UL
EOSINOPHIL # BLD AUTO: 0.7 K/UL
EOSINOPHIL # BLD AUTO: 0.8 K/UL
EOSINOPHIL # BLD AUTO: 0.9 K/UL
EOSINOPHIL # BLD AUTO: 0.9 K/UL
EOSINOPHIL # BLD AUTO: 1 K/UL
EOSINOPHIL # BLD AUTO: ABNORMAL K/UL
EOSINOPHIL NFR BLD: 0 %
EOSINOPHIL NFR BLD: 0.1 %
EOSINOPHIL NFR BLD: 0.2 %
EOSINOPHIL NFR BLD: 0.2 %
EOSINOPHIL NFR BLD: 0.3 %
EOSINOPHIL NFR BLD: 0.4 %
EOSINOPHIL NFR BLD: 0.5 %
EOSINOPHIL NFR BLD: 0.6 %
EOSINOPHIL NFR BLD: 0.7 %
EOSINOPHIL NFR BLD: 0.8 %
EOSINOPHIL NFR BLD: 0.8 %
EOSINOPHIL NFR BLD: 0.9 %
EOSINOPHIL NFR BLD: 1 %
EOSINOPHIL NFR BLD: 1.1 %
EOSINOPHIL NFR BLD: 1.1 %
EOSINOPHIL NFR BLD: 1.2 %
EOSINOPHIL NFR BLD: 1.7 %
EOSINOPHIL NFR BLD: 10 %
EOSINOPHIL NFR BLD: 10.2 %
EOSINOPHIL NFR BLD: 10.3 %
EOSINOPHIL NFR BLD: 10.3 %
EOSINOPHIL NFR BLD: 10.7 %
EOSINOPHIL NFR BLD: 11 %
EOSINOPHIL NFR BLD: 11 %
EOSINOPHIL NFR BLD: 11.1 %
EOSINOPHIL NFR BLD: 11.1 %
EOSINOPHIL NFR BLD: 11.4 %
EOSINOPHIL NFR BLD: 12.4 %
EOSINOPHIL NFR BLD: 12.7 %
EOSINOPHIL NFR BLD: 13.3 %
EOSINOPHIL NFR BLD: 14.3 %
EOSINOPHIL NFR BLD: 17.3 %
EOSINOPHIL NFR BLD: 2.2 %
EOSINOPHIL NFR BLD: 2.5 %
EOSINOPHIL NFR BLD: 2.6 %
EOSINOPHIL NFR BLD: 2.9 %
EOSINOPHIL NFR BLD: 3.1 %
EOSINOPHIL NFR BLD: 3.1 %
EOSINOPHIL NFR BLD: 3.5 %
EOSINOPHIL NFR BLD: 3.6 %
EOSINOPHIL NFR BLD: 3.9 %
EOSINOPHIL NFR BLD: 4.1 %
EOSINOPHIL NFR BLD: 4.2 %
EOSINOPHIL NFR BLD: 4.3 %
EOSINOPHIL NFR BLD: 4.7 %
EOSINOPHIL NFR BLD: 4.8 %
EOSINOPHIL NFR BLD: 4.8 %
EOSINOPHIL NFR BLD: 5 %
EOSINOPHIL NFR BLD: 5.1 %
EOSINOPHIL NFR BLD: 5.1 %
EOSINOPHIL NFR BLD: 5.2 %
EOSINOPHIL NFR BLD: 5.3 %
EOSINOPHIL NFR BLD: 5.4 %
EOSINOPHIL NFR BLD: 5.4 %
EOSINOPHIL NFR BLD: 5.5 %
EOSINOPHIL NFR BLD: 5.6 %
EOSINOPHIL NFR BLD: 5.6 %
EOSINOPHIL NFR BLD: 5.7 %
EOSINOPHIL NFR BLD: 5.7 %
EOSINOPHIL NFR BLD: 6 %
EOSINOPHIL NFR BLD: 6.1 %
EOSINOPHIL NFR BLD: 6.3 %
EOSINOPHIL NFR BLD: 6.4 %
EOSINOPHIL NFR BLD: 6.8 %
EOSINOPHIL NFR BLD: 6.8 %
EOSINOPHIL NFR BLD: 6.9 %
EOSINOPHIL NFR BLD: 7 %
EOSINOPHIL NFR BLD: 7.1 %
EOSINOPHIL NFR BLD: 7.2 %
EOSINOPHIL NFR BLD: 7.3 %
EOSINOPHIL NFR BLD: 7.4 %
EOSINOPHIL NFR BLD: 7.4 %
EOSINOPHIL NFR BLD: 7.6 %
EOSINOPHIL NFR BLD: 7.8 %
EOSINOPHIL NFR BLD: 7.9 %
EOSINOPHIL NFR BLD: 8 %
EOSINOPHIL NFR BLD: 8 %
EOSINOPHIL NFR BLD: 8.1 %
EOSINOPHIL NFR BLD: 8.2 %
EOSINOPHIL NFR BLD: 8.2 %
EOSINOPHIL NFR BLD: 8.3 %
EOSINOPHIL NFR BLD: 8.4 %
EOSINOPHIL NFR BLD: 8.4 %
EOSINOPHIL NFR BLD: 8.5 %
EOSINOPHIL NFR BLD: 8.6 %
EOSINOPHIL NFR BLD: 8.6 %
EOSINOPHIL NFR BLD: 8.7 %
EOSINOPHIL NFR BLD: 8.8 %
EOSINOPHIL NFR BLD: 8.8 %
EOSINOPHIL NFR BLD: 8.9 %
EOSINOPHIL NFR BLD: 8.9 %
EOSINOPHIL NFR BLD: 9 %
EOSINOPHIL NFR BLD: 9 %
EOSINOPHIL NFR BLD: 9.1 %
EOSINOPHIL NFR BLD: 9.2 %
EOSINOPHIL NFR BLD: 9.2 %
EOSINOPHIL NFR BLD: 9.3 %
EOSINOPHIL NFR BLD: 9.3 %
EOSINOPHIL NFR BLD: 9.4 %
EOSINOPHIL NFR BLD: 9.5 %
EOSINOPHIL NFR BLD: 9.6 %
EOSINOPHIL NFR BLD: 9.6 %
EOSINOPHIL NFR BLD: 9.7 %
EOSINOPHIL NFR BLD: 9.7 %
EOSINOPHIL NFR BLD: 9.8 %
EOSINOPHIL NFR BLD: 9.9 %
ERYTHROCYTE [DISTWIDTH] IN BLOOD BY AUTOMATED COUNT: 16 %
ERYTHROCYTE [DISTWIDTH] IN BLOOD BY AUTOMATED COUNT: 16 %
ERYTHROCYTE [DISTWIDTH] IN BLOOD BY AUTOMATED COUNT: 16.1 %
ERYTHROCYTE [DISTWIDTH] IN BLOOD BY AUTOMATED COUNT: 16.4 %
ERYTHROCYTE [DISTWIDTH] IN BLOOD BY AUTOMATED COUNT: 16.5 %
ERYTHROCYTE [DISTWIDTH] IN BLOOD BY AUTOMATED COUNT: 16.5 %
ERYTHROCYTE [DISTWIDTH] IN BLOOD BY AUTOMATED COUNT: 16.6 %
ERYTHROCYTE [DISTWIDTH] IN BLOOD BY AUTOMATED COUNT: 16.7 %
ERYTHROCYTE [DISTWIDTH] IN BLOOD BY AUTOMATED COUNT: 16.8 %
ERYTHROCYTE [DISTWIDTH] IN BLOOD BY AUTOMATED COUNT: 16.9 %
ERYTHROCYTE [DISTWIDTH] IN BLOOD BY AUTOMATED COUNT: 17 %
ERYTHROCYTE [DISTWIDTH] IN BLOOD BY AUTOMATED COUNT: 17.1 %
ERYTHROCYTE [DISTWIDTH] IN BLOOD BY AUTOMATED COUNT: 17.2 %
ERYTHROCYTE [DISTWIDTH] IN BLOOD BY AUTOMATED COUNT: 17.3 %
ERYTHROCYTE [DISTWIDTH] IN BLOOD BY AUTOMATED COUNT: 17.3 %
ERYTHROCYTE [DISTWIDTH] IN BLOOD BY AUTOMATED COUNT: 17.4 %
ERYTHROCYTE [DISTWIDTH] IN BLOOD BY AUTOMATED COUNT: 17.4 %
ERYTHROCYTE [DISTWIDTH] IN BLOOD BY AUTOMATED COUNT: 17.5 %
ERYTHROCYTE [DISTWIDTH] IN BLOOD BY AUTOMATED COUNT: 17.6 %
ERYTHROCYTE [DISTWIDTH] IN BLOOD BY AUTOMATED COUNT: 17.8 %
ERYTHROCYTE [DISTWIDTH] IN BLOOD BY AUTOMATED COUNT: 17.8 %
ERYTHROCYTE [DISTWIDTH] IN BLOOD BY AUTOMATED COUNT: 17.9 %
ERYTHROCYTE [DISTWIDTH] IN BLOOD BY AUTOMATED COUNT: 18 %
ERYTHROCYTE [DISTWIDTH] IN BLOOD BY AUTOMATED COUNT: 18 %
ERYTHROCYTE [DISTWIDTH] IN BLOOD BY AUTOMATED COUNT: 18.1 %
ERYTHROCYTE [DISTWIDTH] IN BLOOD BY AUTOMATED COUNT: 18.2 %
ERYTHROCYTE [DISTWIDTH] IN BLOOD BY AUTOMATED COUNT: 18.2 %
ERYTHROCYTE [DISTWIDTH] IN BLOOD BY AUTOMATED COUNT: 18.3 %
ERYTHROCYTE [DISTWIDTH] IN BLOOD BY AUTOMATED COUNT: 18.4 %
ERYTHROCYTE [DISTWIDTH] IN BLOOD BY AUTOMATED COUNT: 18.4 %
ERYTHROCYTE [DISTWIDTH] IN BLOOD BY AUTOMATED COUNT: 18.6 %
ERYTHROCYTE [DISTWIDTH] IN BLOOD BY AUTOMATED COUNT: 18.8 %
ERYTHROCYTE [DISTWIDTH] IN BLOOD BY AUTOMATED COUNT: 18.9 %
ERYTHROCYTE [DISTWIDTH] IN BLOOD BY AUTOMATED COUNT: 19 %
ERYTHROCYTE [DISTWIDTH] IN BLOOD BY AUTOMATED COUNT: 19.1 %
ERYTHROCYTE [DISTWIDTH] IN BLOOD BY AUTOMATED COUNT: 19.2 %
ERYTHROCYTE [DISTWIDTH] IN BLOOD BY AUTOMATED COUNT: 19.3 %
ERYTHROCYTE [DISTWIDTH] IN BLOOD BY AUTOMATED COUNT: 19.3 %
ERYTHROCYTE [DISTWIDTH] IN BLOOD BY AUTOMATED COUNT: 19.4 %
ERYTHROCYTE [DISTWIDTH] IN BLOOD BY AUTOMATED COUNT: 19.5 %
ERYTHROCYTE [DISTWIDTH] IN BLOOD BY AUTOMATED COUNT: 19.5 %
ERYTHROCYTE [DISTWIDTH] IN BLOOD BY AUTOMATED COUNT: 19.6 %
ERYTHROCYTE [DISTWIDTH] IN BLOOD BY AUTOMATED COUNT: 19.7 %
ERYTHROCYTE [DISTWIDTH] IN BLOOD BY AUTOMATED COUNT: 19.7 %
ERYTHROCYTE [DISTWIDTH] IN BLOOD BY AUTOMATED COUNT: 19.8 %
ERYTHROCYTE [DISTWIDTH] IN BLOOD BY AUTOMATED COUNT: 19.9 %
ERYTHROCYTE [DISTWIDTH] IN BLOOD BY AUTOMATED COUNT: 20 %
ERYTHROCYTE [DISTWIDTH] IN BLOOD BY AUTOMATED COUNT: 20.1 %
ERYTHROCYTE [DISTWIDTH] IN BLOOD BY AUTOMATED COUNT: 20.2 %
ERYTHROCYTE [DISTWIDTH] IN BLOOD BY AUTOMATED COUNT: 20.2 %
ERYTHROCYTE [DISTWIDTH] IN BLOOD BY AUTOMATED COUNT: 20.3 %
ERYTHROCYTE [DISTWIDTH] IN BLOOD BY AUTOMATED COUNT: 20.4 %
ERYTHROCYTE [DISTWIDTH] IN BLOOD BY AUTOMATED COUNT: 20.7 %
ERYTHROCYTE [DISTWIDTH] IN BLOOD BY AUTOMATED COUNT: 20.8 %
ERYTHROCYTE [DISTWIDTH] IN BLOOD BY AUTOMATED COUNT: 20.9 %
ERYTHROCYTE [DISTWIDTH] IN BLOOD BY AUTOMATED COUNT: 21 %
ERYTHROCYTE [DISTWIDTH] IN BLOOD BY AUTOMATED COUNT: 21 %
ERYTHROCYTE [DISTWIDTH] IN BLOOD BY AUTOMATED COUNT: 21.1 %
ERYTHROCYTE [DISTWIDTH] IN BLOOD BY AUTOMATED COUNT: 21.2 %
ERYTHROCYTE [DISTWIDTH] IN BLOOD BY AUTOMATED COUNT: 21.2 %
ERYTHROCYTE [DISTWIDTH] IN BLOOD BY AUTOMATED COUNT: 21.5 %
ERYTHROCYTE [DISTWIDTH] IN BLOOD BY AUTOMATED COUNT: 21.9 %
ERYTHROCYTE [DISTWIDTH] IN BLOOD BY AUTOMATED COUNT: 22 %
ERYTHROCYTE [DISTWIDTH] IN BLOOD BY AUTOMATED COUNT: 22 %
ERYTHROCYTE [DISTWIDTH] IN BLOOD BY AUTOMATED COUNT: 23.2 %
EST. GFR  (AFRICAN AMERICAN): 18.3 ML/MIN/1.73 M^2
EST. GFR  (AFRICAN AMERICAN): 20.3 ML/MIN/1.73 M^2
EST. GFR  (AFRICAN AMERICAN): 21 ML/MIN/1.73 M^2
EST. GFR  (AFRICAN AMERICAN): 21 ML/MIN/1.73 M^2
EST. GFR  (AFRICAN AMERICAN): 24.6 ML/MIN/1.73 M^2
EST. GFR  (AFRICAN AMERICAN): 24.6 ML/MIN/1.73 M^2
EST. GFR  (AFRICAN AMERICAN): 29.4 ML/MIN/1.73 M^2
EST. GFR  (AFRICAN AMERICAN): 32.5 ML/MIN/1.73 M^2
EST. GFR  (AFRICAN AMERICAN): 41 ML/MIN/1.73 M^2
EST. GFR  (AFRICAN AMERICAN): 41 ML/MIN/1.73 M^2
EST. GFR  (AFRICAN AMERICAN): 46.9 ML/MIN/1.73 M^2
EST. GFR  (AFRICAN AMERICAN): 50.4 ML/MIN/1.73 M^2
EST. GFR  (AFRICAN AMERICAN): 50.4 ML/MIN/1.73 M^2
EST. GFR  (AFRICAN AMERICAN): 55 ML/MIN/1.73 M^2
EST. GFR  (AFRICAN AMERICAN): 59 ML/MIN/1.73 M^2
EST. GFR  (AFRICAN AMERICAN): 59.3 ML/MIN/1.73 M^2
EST. GFR  (AFRICAN AMERICAN): >60 ML/MIN/1.73 M^2
EST. GFR  (NON AFRICAN AMERICAN): 15.8 ML/MIN/1.73 M^2
EST. GFR  (NON AFRICAN AMERICAN): 17.5 ML/MIN/1.73 M^2
EST. GFR  (NON AFRICAN AMERICAN): 18.2 ML/MIN/1.73 M^2
EST. GFR  (NON AFRICAN AMERICAN): 18.2 ML/MIN/1.73 M^2
EST. GFR  (NON AFRICAN AMERICAN): 21.3 ML/MIN/1.73 M^2
EST. GFR  (NON AFRICAN AMERICAN): 21.3 ML/MIN/1.73 M^2
EST. GFR  (NON AFRICAN AMERICAN): 25.4 ML/MIN/1.73 M^2
EST. GFR  (NON AFRICAN AMERICAN): 28.1 ML/MIN/1.73 M^2
EST. GFR  (NON AFRICAN AMERICAN): 35 ML/MIN/1.73 M^2
EST. GFR  (NON AFRICAN AMERICAN): 35.4 ML/MIN/1.73 M^2
EST. GFR  (NON AFRICAN AMERICAN): 40.5 ML/MIN/1.73 M^2
EST. GFR  (NON AFRICAN AMERICAN): 43.6 ML/MIN/1.73 M^2
EST. GFR  (NON AFRICAN AMERICAN): 43.6 ML/MIN/1.73 M^2
EST. GFR  (NON AFRICAN AMERICAN): 47 ML/MIN/1.73 M^2
EST. GFR  (NON AFRICAN AMERICAN): 51 ML/MIN/1.73 M^2
EST. GFR  (NON AFRICAN AMERICAN): 51.3 ML/MIN/1.73 M^2
EST. GFR  (NON AFRICAN AMERICAN): 56.1 ML/MIN/1.73 M^2
EST. GFR  (NON AFRICAN AMERICAN): >60 ML/MIN/1.73 M^2
ESTIMATED PA SYSTOLIC PRESSURE: 16.65
ESTIMATED PA SYSTOLIC PRESSURE: 16.97
ESTIMATED PA SYSTOLIC PRESSURE: 24.48
ESTIMATED PA SYSTOLIC PRESSURE: 33.64
EXT HEMATOCRIT: 29
EXT HEMATOCRIT: 30.6
EXT HEMOGLOBIN: 9.7
EXT HEMOGLOBIN: 9.8
EXT PLATELETS: 197
EXT WBC: 5.2
EXT WBC: 5.3
FACT X PPP CHRO-ACNC: 0.15 IU/ML
FACT X PPP CHRO-ACNC: 0.26 IU/ML
FACT X PPP CHRO-ACNC: 0.31 IU/ML
FACT X PPP CHRO-ACNC: 0.32 IU/ML
FACT X PPP CHRO-ACNC: 0.34 IU/ML
FACT X PPP CHRO-ACNC: 0.37 IU/ML
FACT X PPP CHRO-ACNC: 0.39 IU/ML
FACT X PPP CHRO-ACNC: 0.4 IU/ML
FACT X PPP CHRO-ACNC: 0.41 IU/ML
FACT X PPP CHRO-ACNC: 0.43 IU/ML
FACT X PPP CHRO-ACNC: 0.43 IU/ML
FACT X PPP CHRO-ACNC: 0.44 IU/ML
FACT X PPP CHRO-ACNC: 0.47 IU/ML
FACT X PPP CHRO-ACNC: 0.47 IU/ML
FACT X PPP CHRO-ACNC: 0.5 IU/ML
FACT X PPP CHRO-ACNC: 0.51 IU/ML
FACT X PPP CHRO-ACNC: 0.54 IU/ML
FACT X PPP CHRO-ACNC: 0.58 IU/ML
FACT X PPP CHRO-ACNC: 0.61 IU/ML
FACT X PPP CHRO-ACNC: 0.62 IU/ML
FACT X PPP CHRO-ACNC: 0.65 IU/ML
FACT X PPP CHRO-ACNC: 0.69 IU/ML
FACT X PPP CHRO-ACNC: 0.74 IU/ML
FACT X PPP CHRO-ACNC: 0.76 IU/ML
FACT X PPP CHRO-ACNC: 0.84 IU/ML
FACT X PPP CHRO-ACNC: 0.92 IU/ML
FACT X PPP CHRO-ACNC: <0.1 IU/ML
FERRITIN SERPL-MCNC: 27 NG/ML
FERRITIN SERPL-MCNC: 45 NG/ML
FERRITIN SERPL-MCNC: 67 NG/ML
FERRITIN SERPL-MCNC: 68 NG/ML
FIO2: 32
FLOW: 3
FLOW: 5
FRACTIONAL SHORTENING: 6 % (ref 28–44)
GIANT PLATELETS BLD QL SMEAR: PRESENT
GLUCOSE SERPL-MCNC: 100 MG/DL
GLUCOSE SERPL-MCNC: 102 MG/DL
GLUCOSE SERPL-MCNC: 103 MG/DL
GLUCOSE SERPL-MCNC: 104 MG/DL
GLUCOSE SERPL-MCNC: 105 MG/DL
GLUCOSE SERPL-MCNC: 107 MG/DL
GLUCOSE SERPL-MCNC: 109 MG/DL
GLUCOSE SERPL-MCNC: 113 MG/DL
GLUCOSE SERPL-MCNC: 116 MG/DL
GLUCOSE SERPL-MCNC: 118 MG/DL
GLUCOSE SERPL-MCNC: 119 MG/DL
GLUCOSE SERPL-MCNC: 120 MG/DL
GLUCOSE SERPL-MCNC: 123 MG/DL
GLUCOSE SERPL-MCNC: 123 MG/DL
GLUCOSE SERPL-MCNC: 132 MG/DL
GLUCOSE SERPL-MCNC: 133 MG/DL
GLUCOSE SERPL-MCNC: 135 MG/DL
GLUCOSE SERPL-MCNC: 136 MG/DL
GLUCOSE SERPL-MCNC: 136 MG/DL
GLUCOSE SERPL-MCNC: 141 MG/DL
GLUCOSE SERPL-MCNC: 144 MG/DL
GLUCOSE SERPL-MCNC: 151 MG/DL
GLUCOSE SERPL-MCNC: 158 MG/DL
GLUCOSE SERPL-MCNC: 172 MG/DL
GLUCOSE SERPL-MCNC: 181 MG/DL
GLUCOSE SERPL-MCNC: 550 MG/DL
GLUCOSE SERPL-MCNC: 63 MG/DL
GLUCOSE SERPL-MCNC: 68 MG/DL
GLUCOSE SERPL-MCNC: 71 MG/DL
GLUCOSE SERPL-MCNC: 71 MG/DL
GLUCOSE SERPL-MCNC: 72 MG/DL
GLUCOSE SERPL-MCNC: 72 MG/DL
GLUCOSE SERPL-MCNC: 73 MG/DL
GLUCOSE SERPL-MCNC: 74 MG/DL
GLUCOSE SERPL-MCNC: 75 MG/DL
GLUCOSE SERPL-MCNC: 76 MG/DL
GLUCOSE SERPL-MCNC: 76 MG/DL
GLUCOSE SERPL-MCNC: 77 MG/DL
GLUCOSE SERPL-MCNC: 78 MG/DL
GLUCOSE SERPL-MCNC: 78 MG/DL
GLUCOSE SERPL-MCNC: 79 MG/DL
GLUCOSE SERPL-MCNC: 80 MG/DL
GLUCOSE SERPL-MCNC: 81 MG/DL
GLUCOSE SERPL-MCNC: 82 MG/DL
GLUCOSE SERPL-MCNC: 82 MG/DL
GLUCOSE SERPL-MCNC: 83 MG/DL
GLUCOSE SERPL-MCNC: 84 MG/DL
GLUCOSE SERPL-MCNC: 85 MG/DL
GLUCOSE SERPL-MCNC: 86 MG/DL
GLUCOSE SERPL-MCNC: 87 MG/DL
GLUCOSE SERPL-MCNC: 88 MG/DL
GLUCOSE SERPL-MCNC: 89 MG/DL
GLUCOSE SERPL-MCNC: 90 MG/DL
GLUCOSE SERPL-MCNC: 90 MG/DL
GLUCOSE SERPL-MCNC: 91 MG/DL
GLUCOSE SERPL-MCNC: 92 MG/DL
GLUCOSE SERPL-MCNC: 93 MG/DL
GLUCOSE SERPL-MCNC: 93 MG/DL
GLUCOSE SERPL-MCNC: 94 MG/DL
GLUCOSE SERPL-MCNC: 95 MG/DL
GLUCOSE SERPL-MCNC: 95 MG/DL
GLUCOSE SERPL-MCNC: 96 MG/DL
GLUCOSE SERPL-MCNC: 96 MG/DL
GLUCOSE SERPL-MCNC: 97 MG/DL
GLUCOSE SERPL-MCNC: 97 MG/DL
GLUCOSE UR QL STRIP: NEGATIVE
GLUCOSE: 112
GRAM STN SPEC: NORMAL
GRAM STN SPEC: NORMAL
H PYLORI AG STL QL IA: NOT DETECTED
HAPTOGLOB SERPL-MCNC: 143 MG/DL
HCO3 UR-SCNC: 14.7 MMOL/L (ref 24–28)
HCO3 UR-SCNC: 17 MMOL/L (ref 24–28)
HCO3 UR-SCNC: 19.3 MMOL/L (ref 24–28)
HCO3 UR-SCNC: 19.5 MMOL/L (ref 24–28)
HCO3 UR-SCNC: 21 MMOL/L (ref 24–28)
HCO3 UR-SCNC: 24.3 MMOL/L (ref 24–28)
HCO3 UR-SCNC: 40.4 MMOL/L (ref 24–28)
HCT VFR BLD AUTO: 14.2 %
HCT VFR BLD AUTO: 15 %
HCT VFR BLD AUTO: 15.2 %
HCT VFR BLD AUTO: 15.6 %
HCT VFR BLD AUTO: 17.7 %
HCT VFR BLD AUTO: 17.9 %
HCT VFR BLD AUTO: 19.1 %
HCT VFR BLD AUTO: 19.2 %
HCT VFR BLD AUTO: 19.8 %
HCT VFR BLD AUTO: 20.1 %
HCT VFR BLD AUTO: 20.1 %
HCT VFR BLD AUTO: 20.3 %
HCT VFR BLD AUTO: 20.3 %
HCT VFR BLD AUTO: 20.6 %
HCT VFR BLD AUTO: 21.2 %
HCT VFR BLD AUTO: 21.3 %
HCT VFR BLD AUTO: 21.7 %
HCT VFR BLD AUTO: 22 %
HCT VFR BLD AUTO: 22.2 %
HCT VFR BLD AUTO: 22.3 %
HCT VFR BLD AUTO: 22.4 %
HCT VFR BLD AUTO: 22.5 %
HCT VFR BLD AUTO: 22.5 %
HCT VFR BLD AUTO: 22.6 %
HCT VFR BLD AUTO: 22.6 %
HCT VFR BLD AUTO: 22.7 %
HCT VFR BLD AUTO: 22.8 %
HCT VFR BLD AUTO: 22.9 %
HCT VFR BLD AUTO: 23 %
HCT VFR BLD AUTO: 23 %
HCT VFR BLD AUTO: 23.1 %
HCT VFR BLD AUTO: 23.1 %
HCT VFR BLD AUTO: 23.3 %
HCT VFR BLD AUTO: 23.5 %
HCT VFR BLD AUTO: 23.5 %
HCT VFR BLD AUTO: 23.6 %
HCT VFR BLD AUTO: 23.7 %
HCT VFR BLD AUTO: 23.8 %
HCT VFR BLD AUTO: 23.8 %
HCT VFR BLD AUTO: 24 %
HCT VFR BLD AUTO: 24.1 %
HCT VFR BLD AUTO: 24.2 %
HCT VFR BLD AUTO: 24.3 %
HCT VFR BLD AUTO: 24.4 %
HCT VFR BLD AUTO: 24.5 %
HCT VFR BLD AUTO: 24.6 %
HCT VFR BLD AUTO: 24.7 %
HCT VFR BLD AUTO: 24.8 %
HCT VFR BLD AUTO: 24.9 %
HCT VFR BLD AUTO: 25 %
HCT VFR BLD AUTO: 25 % (ref 41–53)
HCT VFR BLD AUTO: 25.1 %
HCT VFR BLD AUTO: 25.1 %
HCT VFR BLD AUTO: 25.2 %
HCT VFR BLD AUTO: 25.4 %
HCT VFR BLD AUTO: 25.4 %
HCT VFR BLD AUTO: 25.7 %
HCT VFR BLD AUTO: 25.7 %
HCT VFR BLD AUTO: 25.8 %
HCT VFR BLD AUTO: 26 %
HCT VFR BLD AUTO: 26.1 %
HCT VFR BLD AUTO: 26.3 %
HCT VFR BLD AUTO: 26.3 %
HCT VFR BLD AUTO: 26.5 %
HCT VFR BLD AUTO: 26.6 %
HCT VFR BLD AUTO: 26.7 %
HCT VFR BLD AUTO: 26.8 %
HCT VFR BLD AUTO: 26.9 %
HCT VFR BLD AUTO: 27 %
HCT VFR BLD AUTO: 27 % (ref 41–53)
HCT VFR BLD AUTO: 27.2 %
HCT VFR BLD AUTO: 27.3 %
HCT VFR BLD AUTO: 27.5 %
HCT VFR BLD AUTO: 28.4 %
HCT VFR BLD AUTO: 28.6 %
HCT VFR BLD AUTO: 29 %
HCT VFR BLD AUTO: 29.2 %
HCT VFR BLD AUTO: 29.3 %
HCT VFR BLD AUTO: 29.4 %
HCT VFR BLD AUTO: 29.5 %
HCT VFR BLD AUTO: 29.5 %
HCT VFR BLD AUTO: 29.6 %
HCT VFR BLD AUTO: 29.8 %
HCT VFR BLD AUTO: 30 % (ref 41–53)
HCT VFR BLD AUTO: 30.2 %
HCT VFR BLD AUTO: 30.3 %
HCT VFR BLD AUTO: 30.7 %
HCT VFR BLD AUTO: 30.8 %
HCT VFR BLD AUTO: 30.9 %
HCT VFR BLD AUTO: 30.9 %
HCT VFR BLD AUTO: 31 %
HCT VFR BLD AUTO: 31.2 %
HCT VFR BLD AUTO: 31.3 %
HCT VFR BLD AUTO: 31.7 %
HCT VFR BLD AUTO: 31.8 %
HCT VFR BLD AUTO: 32 %
HCT VFR BLD AUTO: 32 %
HCT VFR BLD AUTO: 32.2 %
HCT VFR BLD AUTO: 32.8 %
HCT VFR BLD AUTO: 33 %
HCT VFR BLD AUTO: 33.4 %
HCT VFR BLD AUTO: 36.8 %
HCT VFR BLD AUTO: 38.5 %
HCT VFR BLD AUTO: 38.6 %
HCT VFR BLD AUTO: 39.8 %
HCT VFR BLD CALC: 26 %PCV (ref 36–54)
HDLC SERPL-MCNC: 60 MG/DL
HDLC SERPL: 46.9 %
HEP. B SURF AB, QUAL: POSITIVE
HEP. B SURF AB, QUANT.: 81 MIU/ML
HEPATITIS B VIRAL DNA - QUANTITATIVE: <10 IU/ML
HEPATITIS B VIRUS DNA: NOT DETECTED
HGB BLD-MCNC: 10.1 G/DL
HGB BLD-MCNC: 10.7 G/DL
HGB BLD-MCNC: 11 G/DL
HGB BLD-MCNC: 11.8 G/DL
HGB BLD-MCNC: 12.2 G/DL
HGB BLD-MCNC: 12.5 G/DL
HGB BLD-MCNC: 4.1 G/DL
HGB BLD-MCNC: 4.2 G/DL
HGB BLD-MCNC: 4.7 G/DL
HGB BLD-MCNC: 4.9 G/DL
HGB BLD-MCNC: 5.3 G/DL
HGB BLD-MCNC: 5.6 G/DL
HGB BLD-MCNC: 5.9 G/DL
HGB BLD-MCNC: 6.2 G/DL
HGB BLD-MCNC: 6.3 G/DL
HGB BLD-MCNC: 6.5 G/DL
HGB BLD-MCNC: 6.6 G/DL
HGB BLD-MCNC: 6.7 G/DL
HGB BLD-MCNC: 6.8 G/DL
HGB BLD-MCNC: 6.9 G/DL
HGB BLD-MCNC: 7 G/DL
HGB BLD-MCNC: 7.1 G/DL
HGB BLD-MCNC: 7.2 G/DL
HGB BLD-MCNC: 7.3 G/DL
HGB BLD-MCNC: 7.4 G/DL
HGB BLD-MCNC: 7.5 G/DL
HGB BLD-MCNC: 7.6 G/DL
HGB BLD-MCNC: 7.7 G/DL
HGB BLD-MCNC: 7.8 G/DL
HGB BLD-MCNC: 7.8 G/DL (ref 13.5–17.5)
HGB BLD-MCNC: 7.8 G/DL (ref 13.5–17.5)
HGB BLD-MCNC: 7.9 G/DL
HGB BLD-MCNC: 8 G/DL
HGB BLD-MCNC: 8.1 G/DL
HGB BLD-MCNC: 8.2 G/DL
HGB BLD-MCNC: 8.3 G/DL
HGB BLD-MCNC: 8.4 G/DL
HGB BLD-MCNC: 8.5 G/DL
HGB BLD-MCNC: 8.6 G/DL
HGB BLD-MCNC: 8.8 G/DL
HGB BLD-MCNC: 8.9 G/DL
HGB BLD-MCNC: 8.9 G/DL
HGB BLD-MCNC: 9 G/DL
HGB BLD-MCNC: 9.1 G/DL
HGB BLD-MCNC: 9.2 G/DL
HGB BLD-MCNC: 9.3 G/DL
HGB BLD-MCNC: 9.4 G/DL
HGB BLD-MCNC: 9.4 G/DL (ref 13.5–17.5)
HGB BLD-MCNC: 9.5 G/DL
HGB BLD-MCNC: 9.5 G/DL
HGB BLD-MCNC: 9.6 G/DL
HGB BLD-MCNC: 9.7 G/DL
HGB BLD-MCNC: 9.7 G/DL
HGB BLD-MCNC: 9.8 G/DL
HGB BLD-MCNC: 9.8 G/DL
HGB BLD-MCNC: 9.9 G/DL
HGB FREE PLAS-MCNC: 0.3 MG/DL (ref 0–9.7)
HGB UR QL STRIP: NEGATIVE
HYALINE CASTS UR QL AUTO: 6 /LPF
HYPOCHROMIA BLD QL SMEAR: ABNORMAL
IMM GRANULOCYTES # BLD AUTO: 0 K/UL
IMM GRANULOCYTES # BLD AUTO: 0.01 K/UL
IMM GRANULOCYTES # BLD AUTO: 0.02 K/UL
IMM GRANULOCYTES # BLD AUTO: 0.03 K/UL
IMM GRANULOCYTES # BLD AUTO: 0.04 K/UL
IMM GRANULOCYTES # BLD AUTO: 0.05 K/UL
IMM GRANULOCYTES # BLD AUTO: 0.06 K/UL
IMM GRANULOCYTES # BLD AUTO: 0.06 K/UL
IMM GRANULOCYTES # BLD AUTO: 0.08 K/UL
IMM GRANULOCYTES # BLD AUTO: 0.09 K/UL
IMM GRANULOCYTES # BLD AUTO: 0.1 K/UL
IMM GRANULOCYTES # BLD AUTO: 0.1 K/UL
IMM GRANULOCYTES # BLD AUTO: 0.17 K/UL
IMM GRANULOCYTES # BLD AUTO: 0.17 K/UL
IMM GRANULOCYTES # BLD AUTO: 0.18 K/UL
IMM GRANULOCYTES # BLD AUTO: 0.21 K/UL
IMM GRANULOCYTES # BLD AUTO: 0.22 K/UL
IMM GRANULOCYTES # BLD AUTO: 0.23 K/UL
IMM GRANULOCYTES # BLD AUTO: 0.91 K/UL
IMM GRANULOCYTES # BLD AUTO: 1.09 K/UL
IMM GRANULOCYTES # BLD AUTO: 1.12 K/UL
IMM GRANULOCYTES # BLD AUTO: 1.16 K/UL
IMM GRANULOCYTES # BLD AUTO: 1.16 K/UL
IMM GRANULOCYTES # BLD AUTO: 1.43 K/UL
IMM GRANULOCYTES # BLD AUTO: ABNORMAL K/UL
IMM GRANULOCYTES NFR BLD AUTO: 0 %
IMM GRANULOCYTES NFR BLD AUTO: 0.1 %
IMM GRANULOCYTES NFR BLD AUTO: 0.2 %
IMM GRANULOCYTES NFR BLD AUTO: 0.3 %
IMM GRANULOCYTES NFR BLD AUTO: 0.4 %
IMM GRANULOCYTES NFR BLD AUTO: 0.5 %
IMM GRANULOCYTES NFR BLD AUTO: 0.6 %
IMM GRANULOCYTES NFR BLD AUTO: 0.7 %
IMM GRANULOCYTES NFR BLD AUTO: 0.9 %
IMM GRANULOCYTES NFR BLD AUTO: 1 %
IMM GRANULOCYTES NFR BLD AUTO: 1.2 %
IMM GRANULOCYTES NFR BLD AUTO: 1.4 %
IMM GRANULOCYTES NFR BLD AUTO: 2.4 %
IMM GRANULOCYTES NFR BLD AUTO: 2.5 %
IMM GRANULOCYTES NFR BLD AUTO: 2.6 %
IMM GRANULOCYTES NFR BLD AUTO: 2.6 %
IMM GRANULOCYTES NFR BLD AUTO: 2.7 %
IMM GRANULOCYTES NFR BLD AUTO: 2.8 %
IMM GRANULOCYTES NFR BLD AUTO: 3.2 %
IMM GRANULOCYTES NFR BLD AUTO: ABNORMAL %
INR PPP: 1
INR PPP: 1
INR PPP: 1.1
INR PPP: 1.2
INR PPP: 1.3
INR PPP: 1.4
INR PPP: 1.5
INR PPP: 1.6
INR PPP: 1.7
INR PPP: 1.8
INR PPP: 1.9
INR PPP: 2
INR PPP: 2.1
INR PPP: 2.2
INR PPP: 2.3
INR PPP: 2.4
INR PPP: 2.5
INR PPP: 2.6
INR PPP: 2.7
INR PPP: 2.8
INR PPP: 2.9
INR PPP: 2.9
INR PPP: 3
INR PPP: 3.1
INR PPP: 3.2
INR PPP: 3.3
INR PPP: 3.3
INR PPP: 3.4
INR PPP: 3.4
INR PPP: 3.5
INR PPP: 3.5
INR PPP: 3.6
INR PPP: 3.7
INR PPP: 3.8
INR PPP: 3.8
INR PPP: 3.9
INR PPP: 4
INR PPP: 4
INR PPP: 4.1
INR PPP: 4.2
INR PPP: 4.3
INR PPP: 4.4
INR PPP: 4.5
INR PPP: 4.6
INR PPP: 5
INR PPP: 6.6
INTERVENTRICULAR SEPTUM: 1.06 CM (ref 0.6–1.1)
IRON SERPL-MCNC: 17 UG/DL
IRON SERPL-MCNC: 23 UG/DL
IRON SERPL-MCNC: 30 UG/DL
KETONES UR QL STRIP: NEGATIVE
LA MAJOR: 6.49 CM
LA MINOR: 6.39 CM
LA WIDTH: 4.18 CM
LACTATE SERPL-SCNC: 1.2 MMOL/L
LACTATE SERPL-SCNC: 1.3 MMOL/L
LACTATE SERPL-SCNC: 1.4 MMOL/L
LACTATE SERPL-SCNC: 2.7 MMOL/L
LACTATE SERPL-SCNC: 2.9 MMOL/L
LACTATE SERPL-SCNC: 2.9 MMOL/L
LACTATE SERPL-SCNC: 3.1 MMOL/L
LACTATE SERPL-SCNC: 4.4 MMOL/L
LACTATE SERPL-SCNC: 8 MMOL/L
LDH SERPL L TO P-CCNC: 10.2 MMOL/L (ref 0.36–1.25)
LDH SERPL L TO P-CCNC: 118 U/L
LDH SERPL L TO P-CCNC: 123 U/L
LDH SERPL L TO P-CCNC: 127 U/L
LDH SERPL L TO P-CCNC: 131 U/L
LDH SERPL L TO P-CCNC: 133 U/L
LDH SERPL L TO P-CCNC: 133 U/L
LDH SERPL L TO P-CCNC: 134 U/L
LDH SERPL L TO P-CCNC: 136 U/L
LDH SERPL L TO P-CCNC: 138 U/L
LDH SERPL L TO P-CCNC: 138 U/L
LDH SERPL L TO P-CCNC: 140 U/L
LDH SERPL L TO P-CCNC: 143 U/L
LDH SERPL L TO P-CCNC: 144 U/L
LDH SERPL L TO P-CCNC: 144 U/L
LDH SERPL L TO P-CCNC: 145 U/L
LDH SERPL L TO P-CCNC: 145 U/L
LDH SERPL L TO P-CCNC: 146 U/L
LDH SERPL L TO P-CCNC: 146 U/L
LDH SERPL L TO P-CCNC: 147 U/L
LDH SERPL L TO P-CCNC: 148 U/L
LDH SERPL L TO P-CCNC: 148 U/L
LDH SERPL L TO P-CCNC: 152 U/L
LDH SERPL L TO P-CCNC: 153 U/L
LDH SERPL L TO P-CCNC: 153 U/L
LDH SERPL L TO P-CCNC: 154 U/L
LDH SERPL L TO P-CCNC: 155 U/L
LDH SERPL L TO P-CCNC: 155 U/L
LDH SERPL L TO P-CCNC: 156 U/L
LDH SERPL L TO P-CCNC: 157 U/L
LDH SERPL L TO P-CCNC: 159 U/L
LDH SERPL L TO P-CCNC: 160 U/L
LDH SERPL L TO P-CCNC: 160 U/L
LDH SERPL L TO P-CCNC: 161 U/L
LDH SERPL L TO P-CCNC: 161 U/L
LDH SERPL L TO P-CCNC: 163 U/L
LDH SERPL L TO P-CCNC: 164 U/L
LDH SERPL L TO P-CCNC: 165 U/L
LDH SERPL L TO P-CCNC: 166 U/L
LDH SERPL L TO P-CCNC: 167 U/L
LDH SERPL L TO P-CCNC: 168 U/L
LDH SERPL L TO P-CCNC: 169 U/L
LDH SERPL L TO P-CCNC: 170 U/L
LDH SERPL L TO P-CCNC: 173 U/L
LDH SERPL L TO P-CCNC: 174 U/L
LDH SERPL L TO P-CCNC: 177 U/L
LDH SERPL L TO P-CCNC: 177 U/L
LDH SERPL L TO P-CCNC: 178 U/L
LDH SERPL L TO P-CCNC: 181 U/L
LDH SERPL L TO P-CCNC: 181 U/L
LDH SERPL L TO P-CCNC: 190 U/L
LDH SERPL L TO P-CCNC: 196 U/L
LDH SERPL L TO P-CCNC: 200 U/L
LDH SERPL L TO P-CCNC: 202 U/L
LDH SERPL L TO P-CCNC: 213 U/L
LDH SERPL L TO P-CCNC: 229 U/L
LDH SERPL L TO P-CCNC: 257 U/L
LDH SERPL L TO P-CCNC: 264 U/L
LDH SERPL L TO P-CCNC: 265 U/L
LDH SERPL L TO P-CCNC: 266 U/L
LDH SERPL L TO P-CCNC: 266 U/L
LDH SERPL L TO P-CCNC: 281 U/L
LDH SERPL L TO P-CCNC: 317 U/L
LDH SERPL L TO P-CCNC: 333 U/L
LDH SERPL L TO P-CCNC: 416 U/L
LDLC SERPL CALC-MCNC: 55.6 MG/DL
LEFT ATRIUM SIZE: 3.97 CM
LEFT ATRIUM VOLUME INDEX: 46.6 ML/M2
LEFT ATRIUM VOLUME: 90.83 CM3
LEFT INTERNAL DIMENSION IN SYSTOLE: 5.2 CM (ref 2.1–4)
LEFT VENTRICLE DIASTOLIC VOLUME INDEX: 77.37 ML/M2
LEFT VENTRICLE DIASTOLIC VOLUME: 150.88 ML
LEFT VENTRICLE MASS INDEX: 87.6 G/M2
LEFT VENTRICLE SYSTOLIC VOLUME INDEX: 66.5 ML/M2
LEFT VENTRICLE SYSTOLIC VOLUME: 129.71 ML
LEFT VENTRICULAR INTERNAL DIMENSION IN DIASTOLE: 5.56 CM (ref 3.5–6)
LEFT VENTRICULAR MASS: 170.73 G
LEUKOCYTE ESTERASE UR QL STRIP: NEGATIVE
LIPASE SERPL-CCNC: 13 U/L
LIPASE SERPL-CCNC: 34 U/L
LOG HBV IU/ML: <1 LOG (10) IU/ML
LV LATERAL E/E' RATIO: 10.83
LV SEPTAL E/E' RATIO: 16.25
LYMPHOCYTES # BLD AUTO: 0.5 K/UL
LYMPHOCYTES # BLD AUTO: 0.6 K/UL
LYMPHOCYTES # BLD AUTO: 0.8 K/UL
LYMPHOCYTES # BLD AUTO: 0.9 K/UL
LYMPHOCYTES # BLD AUTO: 1 K/UL
LYMPHOCYTES # BLD AUTO: 1.1 K/UL
LYMPHOCYTES # BLD AUTO: 1.2 K/UL
LYMPHOCYTES # BLD AUTO: 1.3 K/UL
LYMPHOCYTES # BLD AUTO: 1.4 K/UL
LYMPHOCYTES # BLD AUTO: 1.5 K/UL
LYMPHOCYTES # BLD AUTO: 1.6 K/UL
LYMPHOCYTES # BLD AUTO: 1.7 K/UL
LYMPHOCYTES # BLD AUTO: 1.8 K/UL
LYMPHOCYTES # BLD AUTO: 1.9 K/UL
LYMPHOCYTES # BLD AUTO: 2 K/UL
LYMPHOCYTES # BLD AUTO: 2.1 K/UL
LYMPHOCYTES # BLD AUTO: 2.1 K/UL
LYMPHOCYTES # BLD AUTO: 2.2 K/UL
LYMPHOCYTES # BLD AUTO: 2.3 K/UL
LYMPHOCYTES # BLD AUTO: 2.3 K/UL
LYMPHOCYTES # BLD AUTO: 2.8 K/UL
LYMPHOCYTES # BLD AUTO: 3.2 K/UL
LYMPHOCYTES # BLD AUTO: 4.6 K/UL
LYMPHOCYTES # BLD AUTO: ABNORMAL K/UL
LYMPHOCYTES NFR BLD: 10.6 %
LYMPHOCYTES NFR BLD: 11 %
LYMPHOCYTES NFR BLD: 11.4 %
LYMPHOCYTES NFR BLD: 11.5 %
LYMPHOCYTES NFR BLD: 11.8 %
LYMPHOCYTES NFR BLD: 12.4 %
LYMPHOCYTES NFR BLD: 12.4 %
LYMPHOCYTES NFR BLD: 13.3 %
LYMPHOCYTES NFR BLD: 13.7 %
LYMPHOCYTES NFR BLD: 13.8 %
LYMPHOCYTES NFR BLD: 13.9 %
LYMPHOCYTES NFR BLD: 14.2 %
LYMPHOCYTES NFR BLD: 14.3 %
LYMPHOCYTES NFR BLD: 14.4 %
LYMPHOCYTES NFR BLD: 14.8 %
LYMPHOCYTES NFR BLD: 15.3 %
LYMPHOCYTES NFR BLD: 15.4 %
LYMPHOCYTES NFR BLD: 15.4 %
LYMPHOCYTES NFR BLD: 15.7 %
LYMPHOCYTES NFR BLD: 15.7 %
LYMPHOCYTES NFR BLD: 15.8 %
LYMPHOCYTES NFR BLD: 15.8 %
LYMPHOCYTES NFR BLD: 15.9 %
LYMPHOCYTES NFR BLD: 15.9 %
LYMPHOCYTES NFR BLD: 16 %
LYMPHOCYTES NFR BLD: 16 %
LYMPHOCYTES NFR BLD: 16.5 %
LYMPHOCYTES NFR BLD: 16.8 %
LYMPHOCYTES NFR BLD: 17 %
LYMPHOCYTES NFR BLD: 17.2 %
LYMPHOCYTES NFR BLD: 17.3 %
LYMPHOCYTES NFR BLD: 17.3 %
LYMPHOCYTES NFR BLD: 17.6 %
LYMPHOCYTES NFR BLD: 17.8 %
LYMPHOCYTES NFR BLD: 18.1 %
LYMPHOCYTES NFR BLD: 18.2 %
LYMPHOCYTES NFR BLD: 18.2 %
LYMPHOCYTES NFR BLD: 18.6 %
LYMPHOCYTES NFR BLD: 18.9 %
LYMPHOCYTES NFR BLD: 19.1 %
LYMPHOCYTES NFR BLD: 19.4 %
LYMPHOCYTES NFR BLD: 19.7 %
LYMPHOCYTES NFR BLD: 19.8 %
LYMPHOCYTES NFR BLD: 2.7 %
LYMPHOCYTES NFR BLD: 2.8 %
LYMPHOCYTES NFR BLD: 2.9 %
LYMPHOCYTES NFR BLD: 2.9 %
LYMPHOCYTES NFR BLD: 20.2 %
LYMPHOCYTES NFR BLD: 20.3 %
LYMPHOCYTES NFR BLD: 20.4 %
LYMPHOCYTES NFR BLD: 20.9 %
LYMPHOCYTES NFR BLD: 21.6 %
LYMPHOCYTES NFR BLD: 21.7 %
LYMPHOCYTES NFR BLD: 21.8 %
LYMPHOCYTES NFR BLD: 22 %
LYMPHOCYTES NFR BLD: 22.1 %
LYMPHOCYTES NFR BLD: 22.1 %
LYMPHOCYTES NFR BLD: 22.3 %
LYMPHOCYTES NFR BLD: 22.4 %
LYMPHOCYTES NFR BLD: 22.4 %
LYMPHOCYTES NFR BLD: 22.8 %
LYMPHOCYTES NFR BLD: 23.1 %
LYMPHOCYTES NFR BLD: 23.3 %
LYMPHOCYTES NFR BLD: 23.4 %
LYMPHOCYTES NFR BLD: 23.4 %
LYMPHOCYTES NFR BLD: 23.6 %
LYMPHOCYTES NFR BLD: 23.8 %
LYMPHOCYTES NFR BLD: 24 %
LYMPHOCYTES NFR BLD: 24 %
LYMPHOCYTES NFR BLD: 24.1 %
LYMPHOCYTES NFR BLD: 24.3 %
LYMPHOCYTES NFR BLD: 24.4 %
LYMPHOCYTES NFR BLD: 24.5 %
LYMPHOCYTES NFR BLD: 24.6 %
LYMPHOCYTES NFR BLD: 25.2 %
LYMPHOCYTES NFR BLD: 25.3 %
LYMPHOCYTES NFR BLD: 25.5 %
LYMPHOCYTES NFR BLD: 25.7 %
LYMPHOCYTES NFR BLD: 25.8 %
LYMPHOCYTES NFR BLD: 25.8 %
LYMPHOCYTES NFR BLD: 26 %
LYMPHOCYTES NFR BLD: 26.2 %
LYMPHOCYTES NFR BLD: 26.6 %
LYMPHOCYTES NFR BLD: 27.2 %
LYMPHOCYTES NFR BLD: 27.5 %
LYMPHOCYTES NFR BLD: 27.6 %
LYMPHOCYTES NFR BLD: 27.8 %
LYMPHOCYTES NFR BLD: 27.8 %
LYMPHOCYTES NFR BLD: 27.9 %
LYMPHOCYTES NFR BLD: 29.1 %
LYMPHOCYTES NFR BLD: 29.2 %
LYMPHOCYTES NFR BLD: 29.3 %
LYMPHOCYTES NFR BLD: 29.6 %
LYMPHOCYTES NFR BLD: 29.8 %
LYMPHOCYTES NFR BLD: 29.9 %
LYMPHOCYTES NFR BLD: 3 %
LYMPHOCYTES NFR BLD: 3.2 %
LYMPHOCYTES NFR BLD: 3.4 %
LYMPHOCYTES NFR BLD: 3.7 %
LYMPHOCYTES NFR BLD: 30.2 %
LYMPHOCYTES NFR BLD: 30.5 %
LYMPHOCYTES NFR BLD: 30.6 %
LYMPHOCYTES NFR BLD: 30.8 %
LYMPHOCYTES NFR BLD: 30.9 %
LYMPHOCYTES NFR BLD: 31.2 %
LYMPHOCYTES NFR BLD: 31.4 %
LYMPHOCYTES NFR BLD: 31.4 %
LYMPHOCYTES NFR BLD: 31.7 %
LYMPHOCYTES NFR BLD: 31.7 %
LYMPHOCYTES NFR BLD: 32.1 %
LYMPHOCYTES NFR BLD: 32.5 %
LYMPHOCYTES NFR BLD: 32.6 %
LYMPHOCYTES NFR BLD: 32.8 %
LYMPHOCYTES NFR BLD: 32.8 %
LYMPHOCYTES NFR BLD: 33.3 %
LYMPHOCYTES NFR BLD: 33.4 %
LYMPHOCYTES NFR BLD: 33.6 %
LYMPHOCYTES NFR BLD: 34 %
LYMPHOCYTES NFR BLD: 34.2 %
LYMPHOCYTES NFR BLD: 34.5 %
LYMPHOCYTES NFR BLD: 34.7 %
LYMPHOCYTES NFR BLD: 34.8 %
LYMPHOCYTES NFR BLD: 34.9 %
LYMPHOCYTES NFR BLD: 35.5 %
LYMPHOCYTES NFR BLD: 35.7 %
LYMPHOCYTES NFR BLD: 35.8 %
LYMPHOCYTES NFR BLD: 36.8 %
LYMPHOCYTES NFR BLD: 37 %
LYMPHOCYTES NFR BLD: 38.7 %
LYMPHOCYTES NFR BLD: 39.7 %
LYMPHOCYTES NFR BLD: 4.3 %
LYMPHOCYTES NFR BLD: 4.3 %
LYMPHOCYTES NFR BLD: 4.5 %
LYMPHOCYTES NFR BLD: 4.5 %
LYMPHOCYTES NFR BLD: 43 %
LYMPHOCYTES NFR BLD: 43.8 %
LYMPHOCYTES NFR BLD: 5.8 %
LYMPHOCYTES NFR BLD: 5.8 %
LYMPHOCYTES NFR BLD: 7.1 %
LYMPHOCYTES NFR BLD: 7.8 %
LYMPHOCYTES NFR BLD: 7.8 %
LYMPHOCYTES NFR BLD: 9.2 %
MAGNESIUM SERPL-MCNC: 1.6 MG/DL
MAGNESIUM SERPL-MCNC: 1.7 MG/DL
MAGNESIUM SERPL-MCNC: 1.8 MG/DL
MAGNESIUM SERPL-MCNC: 1.8 MG/DL
MAGNESIUM SERPL-MCNC: 1.9 MG/DL
MAGNESIUM SERPL-MCNC: 2 MG/DL
MAGNESIUM SERPL-MCNC: 2.1 MG/DL
MAGNESIUM SERPL-MCNC: 2.2 MG/DL
MAGNESIUM SERPL-MCNC: 2.3 MG/DL
MAGNESIUM SERPL-MCNC: 2.4 MG/DL
MAGNESIUM SERPL-MCNC: 2.5 MG/DL
MAGNESIUM SERPL-MCNC: 2.6 MG/DL
MAGNESIUM SERPL-MCNC: 2.7 MG/DL
MAGNESIUM SERPL-MCNC: 2.7 MG/DL
MCH RBC QN AUTO: 20.8 PG
MCH RBC QN AUTO: 21.2 PG
MCH RBC QN AUTO: 22.9 PG
MCH RBC QN AUTO: 22.9 PG
MCH RBC QN AUTO: 23 PG
MCH RBC QN AUTO: 23.3 PG
MCH RBC QN AUTO: 23.4 PG
MCH RBC QN AUTO: 23.5 PG
MCH RBC QN AUTO: 23.6 PG
MCH RBC QN AUTO: 23.7 PG
MCH RBC QN AUTO: 23.8 PG
MCH RBC QN AUTO: 23.9 PG
MCH RBC QN AUTO: 24 PG
MCH RBC QN AUTO: 24.3 PG
MCH RBC QN AUTO: 24.3 PG
MCH RBC QN AUTO: 24.5 PG
MCH RBC QN AUTO: 24.6 PG
MCH RBC QN AUTO: 24.7 PG
MCH RBC QN AUTO: 24.7 PG
MCH RBC QN AUTO: 24.8 PG
MCH RBC QN AUTO: 24.8 PG
MCH RBC QN AUTO: 24.9 PG
MCH RBC QN AUTO: 25 PG
MCH RBC QN AUTO: 25.1 PG
MCH RBC QN AUTO: 25.3 PG
MCH RBC QN AUTO: 25.3 PG
MCH RBC QN AUTO: 25.4 PG
MCH RBC QN AUTO: 25.5 PG
MCH RBC QN AUTO: 25.5 PG
MCH RBC QN AUTO: 25.6 PG
MCH RBC QN AUTO: 25.7 PG
MCH RBC QN AUTO: 25.8 PG
MCH RBC QN AUTO: 25.8 PG
MCH RBC QN AUTO: 26 PG
MCH RBC QN AUTO: 26.1 PG
MCH RBC QN AUTO: 26.2 PG
MCH RBC QN AUTO: 26.3 PG
MCH RBC QN AUTO: 26.4 PG
MCH RBC QN AUTO: 26.4 PG
MCH RBC QN AUTO: 26.5 PG
MCH RBC QN AUTO: 26.6 PG
MCH RBC QN AUTO: 26.7 PG
MCH RBC QN AUTO: 26.8 PG
MCH RBC QN AUTO: 26.9 PG
MCH RBC QN AUTO: 27 PG
MCH RBC QN AUTO: 27 PG
MCH RBC QN AUTO: 27.1 PG
MCH RBC QN AUTO: 27.2 PG
MCH RBC QN AUTO: 27.3 PG
MCH RBC QN AUTO: 27.4 PG
MCH RBC QN AUTO: 27.5 PG
MCH RBC QN AUTO: 27.5 PG
MCH RBC QN AUTO: 27.6 PG
MCH RBC QN AUTO: 27.6 PG
MCH RBC QN AUTO: 27.7 PG
MCH RBC QN AUTO: 27.7 PG
MCH RBC QN AUTO: 27.8 PG
MCH RBC QN AUTO: 27.9 PG
MCH RBC QN AUTO: 27.9 PG
MCH RBC QN AUTO: 28 PG
MCH RBC QN AUTO: 28.1 PG
MCH RBC QN AUTO: 28.2 PG
MCH RBC QN AUTO: 28.2 PG
MCH RBC QN AUTO: 28.3 PG
MCH RBC QN AUTO: 28.4 PG
MCH RBC QN AUTO: 29 PG
MCH RBC QN AUTO: 29.2 PG
MCHC RBC AUTO-ENTMCNC: 27.3 G/DL
MCHC RBC AUTO-ENTMCNC: 28.8 G/DL
MCHC RBC AUTO-ENTMCNC: 29 G/DL
MCHC RBC AUTO-ENTMCNC: 29.4 G/DL
MCHC RBC AUTO-ENTMCNC: 29.4 G/DL
MCHC RBC AUTO-ENTMCNC: 29.6 G/DL
MCHC RBC AUTO-ENTMCNC: 29.6 G/DL
MCHC RBC AUTO-ENTMCNC: 29.7 G/DL
MCHC RBC AUTO-ENTMCNC: 29.9 G/DL
MCHC RBC AUTO-ENTMCNC: 29.9 G/DL
MCHC RBC AUTO-ENTMCNC: 30 G/DL
MCHC RBC AUTO-ENTMCNC: 30.1 G/DL
MCHC RBC AUTO-ENTMCNC: 30.2 G/DL
MCHC RBC AUTO-ENTMCNC: 30.2 G/DL
MCHC RBC AUTO-ENTMCNC: 30.3 G/DL
MCHC RBC AUTO-ENTMCNC: 30.4 G/DL
MCHC RBC AUTO-ENTMCNC: 30.5 G/DL
MCHC RBC AUTO-ENTMCNC: 30.6 G/DL
MCHC RBC AUTO-ENTMCNC: 30.7 G/DL
MCHC RBC AUTO-ENTMCNC: 30.8 G/DL
MCHC RBC AUTO-ENTMCNC: 30.8 G/DL
MCHC RBC AUTO-ENTMCNC: 30.9 G/DL
MCHC RBC AUTO-ENTMCNC: 31 G/DL
MCHC RBC AUTO-ENTMCNC: 31.1 G/DL
MCHC RBC AUTO-ENTMCNC: 31.2 G/DL
MCHC RBC AUTO-ENTMCNC: 31.3 G/DL
MCHC RBC AUTO-ENTMCNC: 31.4 G/DL
MCHC RBC AUTO-ENTMCNC: 31.5 G/DL
MCHC RBC AUTO-ENTMCNC: 31.6 G/DL
MCHC RBC AUTO-ENTMCNC: 31.7 G/DL
MCHC RBC AUTO-ENTMCNC: 31.8 G/DL
MCHC RBC AUTO-ENTMCNC: 31.9 G/DL
MCHC RBC AUTO-ENTMCNC: 32 G/DL
MCHC RBC AUTO-ENTMCNC: 32.1 G/DL
MCHC RBC AUTO-ENTMCNC: 32.1 G/DL
MCHC RBC AUTO-ENTMCNC: 32.2 G/DL
MCHC RBC AUTO-ENTMCNC: 32.3 G/DL
MCHC RBC AUTO-ENTMCNC: 32.4 G/DL
MCHC RBC AUTO-ENTMCNC: 32.5 G/DL
MCHC RBC AUTO-ENTMCNC: 32.6 G/DL
MCHC RBC AUTO-ENTMCNC: 32.6 G/DL
MCHC RBC AUTO-ENTMCNC: 32.7 G/DL
MCHC RBC AUTO-ENTMCNC: 32.8 G/DL
MCHC RBC AUTO-ENTMCNC: 32.9 G/DL
MCHC RBC AUTO-ENTMCNC: 33.1 G/DL
MCHC RBC AUTO-ENTMCNC: 33.2 G/DL
MCHC RBC AUTO-ENTMCNC: 33.5 G/DL
MCHC RBC AUTO-ENTMCNC: 33.8 G/DL
MCV RBC AUTO: 70 FL
MCV RBC AUTO: 71 FL
MCV RBC AUTO: 72 FL
MCV RBC AUTO: 73 FL
MCV RBC AUTO: 74 FL
MCV RBC AUTO: 76 FL
MCV RBC AUTO: 77 FL
MCV RBC AUTO: 78 FL
MCV RBC AUTO: 79 FL
MCV RBC AUTO: 80 FL
MCV RBC AUTO: 81 FL
MCV RBC AUTO: 82 FL
MCV RBC AUTO: 83 FL
MCV RBC AUTO: 84 FL
MCV RBC AUTO: 85 FL
MCV RBC AUTO: 86 FL
MCV RBC AUTO: 87 FL
MCV RBC AUTO: 88 FL
MCV RBC AUTO: 89 FL
MCV RBC AUTO: 90 FL
MCV RBC AUTO: 91 FL
MCV RBC AUTO: 91 FL
MCV RBC AUTO: 99 FL
MCV RBC AUTO: ABNORMAL FL
METAMYELOCYTES NFR BLD MANUAL: 1 %
MICROSCOPIC COMMENT: ABNORMAL
MITRAL VALVE MOBILITY: NORMAL
MITRAL VALVE REGURGITATION: ABNORMAL
MODE: ABNORMAL
MONOCYTES # BLD AUTO: 0.4 K/UL
MONOCYTES # BLD AUTO: 0.5 K/UL
MONOCYTES # BLD AUTO: 0.6 K/UL
MONOCYTES # BLD AUTO: 0.7 K/UL
MONOCYTES # BLD AUTO: 0.8 K/UL
MONOCYTES # BLD AUTO: 0.9 K/UL
MONOCYTES # BLD AUTO: 1 K/UL
MONOCYTES # BLD AUTO: 1.1 K/UL
MONOCYTES # BLD AUTO: 1.1 K/UL
MONOCYTES # BLD AUTO: 1.2 K/UL
MONOCYTES # BLD AUTO: 1.2 K/UL
MONOCYTES # BLD AUTO: 1.3 K/UL
MONOCYTES # BLD AUTO: 1.3 K/UL
MONOCYTES # BLD AUTO: 1.4 K/UL
MONOCYTES # BLD AUTO: 1.5 K/UL
MONOCYTES # BLD AUTO: 1.6 K/UL
MONOCYTES # BLD AUTO: 1.7 K/UL
MONOCYTES # BLD AUTO: 1.7 K/UL
MONOCYTES # BLD AUTO: 1.8 K/UL
MONOCYTES # BLD AUTO: 1.9 K/UL
MONOCYTES # BLD AUTO: 1.9 K/UL
MONOCYTES # BLD AUTO: 2 K/UL
MONOCYTES # BLD AUTO: 2.1 K/UL
MONOCYTES # BLD AUTO: 2.1 K/UL
MONOCYTES # BLD AUTO: 2.2 K/UL
MONOCYTES # BLD AUTO: ABNORMAL K/UL
MONOCYTES NFR BLD: 10 %
MONOCYTES NFR BLD: 10.1 %
MONOCYTES NFR BLD: 10.2 %
MONOCYTES NFR BLD: 10.4 %
MONOCYTES NFR BLD: 10.5 %
MONOCYTES NFR BLD: 10.6 %
MONOCYTES NFR BLD: 10.7 %
MONOCYTES NFR BLD: 10.8 %
MONOCYTES NFR BLD: 11 %
MONOCYTES NFR BLD: 11.1 %
MONOCYTES NFR BLD: 11.3 %
MONOCYTES NFR BLD: 11.4 %
MONOCYTES NFR BLD: 11.5 %
MONOCYTES NFR BLD: 11.5 %
MONOCYTES NFR BLD: 11.7 %
MONOCYTES NFR BLD: 11.8 %
MONOCYTES NFR BLD: 11.9 %
MONOCYTES NFR BLD: 12 %
MONOCYTES NFR BLD: 12 %
MONOCYTES NFR BLD: 12.3 %
MONOCYTES NFR BLD: 12.3 %
MONOCYTES NFR BLD: 12.4 %
MONOCYTES NFR BLD: 12.9 %
MONOCYTES NFR BLD: 13.1 %
MONOCYTES NFR BLD: 13.4 %
MONOCYTES NFR BLD: 13.8 %
MONOCYTES NFR BLD: 2.7 %
MONOCYTES NFR BLD: 4.2 %
MONOCYTES NFR BLD: 4.2 %
MONOCYTES NFR BLD: 4.4 %
MONOCYTES NFR BLD: 4.5 %
MONOCYTES NFR BLD: 4.6 %
MONOCYTES NFR BLD: 4.7 %
MONOCYTES NFR BLD: 4.8 %
MONOCYTES NFR BLD: 4.8 %
MONOCYTES NFR BLD: 5.5 %
MONOCYTES NFR BLD: 5.5 %
MONOCYTES NFR BLD: 5.7 %
MONOCYTES NFR BLD: 6 %
MONOCYTES NFR BLD: 6.3 %
MONOCYTES NFR BLD: 6.5 %
MONOCYTES NFR BLD: 7 %
MONOCYTES NFR BLD: 7.1 %
MONOCYTES NFR BLD: 7.3 %
MONOCYTES NFR BLD: 7.3 %
MONOCYTES NFR BLD: 7.6 %
MONOCYTES NFR BLD: 7.7 %
MONOCYTES NFR BLD: 7.8 %
MONOCYTES NFR BLD: 7.8 %
MONOCYTES NFR BLD: 7.9 %
MONOCYTES NFR BLD: 8 %
MONOCYTES NFR BLD: 8.1 %
MONOCYTES NFR BLD: 8.2 %
MONOCYTES NFR BLD: 8.3 %
MONOCYTES NFR BLD: 8.4 %
MONOCYTES NFR BLD: 8.4 %
MONOCYTES NFR BLD: 8.5 %
MONOCYTES NFR BLD: 8.6 %
MONOCYTES NFR BLD: 8.7 %
MONOCYTES NFR BLD: 8.8 %
MONOCYTES NFR BLD: 8.9 %
MONOCYTES NFR BLD: 9 %
MONOCYTES NFR BLD: 9.1 %
MONOCYTES NFR BLD: 9.2 %
MONOCYTES NFR BLD: 9.3 %
MONOCYTES NFR BLD: 9.3 %
MONOCYTES NFR BLD: 9.4 %
MONOCYTES NFR BLD: 9.5 %
MONOCYTES NFR BLD: 9.5 %
MONOCYTES NFR BLD: 9.6 %
MONOCYTES NFR BLD: 9.7 %
MONOCYTES NFR BLD: 9.7 %
MONOCYTES NFR BLD: 9.8 %
MONOCYTES NFR BLD: 9.8 %
MONOCYTES NFR BLD: 9.9 %
MV PEAK A VEL: 0.77 M/S
MV PEAK E VEL: 0.65 M/S
N-DESETHYL-AMIODARONE: 0.4 UG/ML
N-DESETHYL-AMIODARONE: 0.5 UG/ML
N-DESETHYL-AMIODARONE: 1.6 UG/ML
NEUTROPHILS # BLD AUTO: 1.5 K/UL
NEUTROPHILS # BLD AUTO: 1.6 K/UL
NEUTROPHILS # BLD AUTO: 1.6 K/UL
NEUTROPHILS # BLD AUTO: 1.8 K/UL
NEUTROPHILS # BLD AUTO: 10.3 K/UL
NEUTROPHILS # BLD AUTO: 10.5 K/UL
NEUTROPHILS # BLD AUTO: 10.6 K/UL
NEUTROPHILS # BLD AUTO: 11.1 K/UL
NEUTROPHILS # BLD AUTO: 11.8 K/UL
NEUTROPHILS # BLD AUTO: 12.4 K/UL
NEUTROPHILS # BLD AUTO: 14.1 K/UL
NEUTROPHILS # BLD AUTO: 14.1 K/UL
NEUTROPHILS # BLD AUTO: 14.7 K/UL
NEUTROPHILS # BLD AUTO: 14.9 K/UL
NEUTROPHILS # BLD AUTO: 14.9 K/UL
NEUTROPHILS # BLD AUTO: 16.1 K/UL
NEUTROPHILS # BLD AUTO: 18 K/UL
NEUTROPHILS # BLD AUTO: 19.6 K/UL
NEUTROPHILS # BLD AUTO: 2.1 K/UL
NEUTROPHILS # BLD AUTO: 2.2 K/UL
NEUTROPHILS # BLD AUTO: 2.2 K/UL
NEUTROPHILS # BLD AUTO: 2.3 K/UL
NEUTROPHILS # BLD AUTO: 2.4 K/UL
NEUTROPHILS # BLD AUTO: 2.5 K/UL
NEUTROPHILS # BLD AUTO: 2.6 K/UL
NEUTROPHILS # BLD AUTO: 2.7 K/UL
NEUTROPHILS # BLD AUTO: 2.7 K/UL
NEUTROPHILS # BLD AUTO: 2.8 K/UL
NEUTROPHILS # BLD AUTO: 2.9 K/UL
NEUTROPHILS # BLD AUTO: 28.6 K/UL
NEUTROPHILS # BLD AUTO: 3 K/UL
NEUTROPHILS # BLD AUTO: 3.1 K/UL
NEUTROPHILS # BLD AUTO: 3.2 K/UL
NEUTROPHILS # BLD AUTO: 3.3 K/UL
NEUTROPHILS # BLD AUTO: 3.4 K/UL
NEUTROPHILS # BLD AUTO: 3.4 K/UL
NEUTROPHILS # BLD AUTO: 3.5 K/UL
NEUTROPHILS # BLD AUTO: 3.6 K/UL
NEUTROPHILS # BLD AUTO: 3.6 K/UL
NEUTROPHILS # BLD AUTO: 3.7 K/UL
NEUTROPHILS # BLD AUTO: 3.9 K/UL
NEUTROPHILS # BLD AUTO: 33.8 K/UL
NEUTROPHILS # BLD AUTO: 38.7 K/UL
NEUTROPHILS # BLD AUTO: 4 K/UL
NEUTROPHILS # BLD AUTO: 4 K/UL
NEUTROPHILS # BLD AUTO: 4.1 K/UL
NEUTROPHILS # BLD AUTO: 4.2 K/UL
NEUTROPHILS # BLD AUTO: 4.3 K/UL
NEUTROPHILS # BLD AUTO: 4.4 K/UL
NEUTROPHILS # BLD AUTO: 4.4 K/UL
NEUTROPHILS # BLD AUTO: 4.5 K/UL
NEUTROPHILS # BLD AUTO: 4.6 K/UL
NEUTROPHILS # BLD AUTO: 4.7 K/UL
NEUTROPHILS # BLD AUTO: 4.7 K/UL
NEUTROPHILS # BLD AUTO: 4.8 K/UL
NEUTROPHILS # BLD AUTO: 4.9 K/UL
NEUTROPHILS # BLD AUTO: 46.6 K/UL
NEUTROPHILS # BLD AUTO: 5 K/UL
NEUTROPHILS # BLD AUTO: 5.1 K/UL
NEUTROPHILS # BLD AUTO: 5.3 K/UL
NEUTROPHILS # BLD AUTO: 5.4 K/UL
NEUTROPHILS # BLD AUTO: 5.5 K/UL
NEUTROPHILS # BLD AUTO: 5.6 K/UL
NEUTROPHILS # BLD AUTO: 5.6 K/UL
NEUTROPHILS # BLD AUTO: 5.7 K/UL
NEUTROPHILS # BLD AUTO: 5.9 K/UL
NEUTROPHILS # BLD AUTO: 6 K/UL
NEUTROPHILS # BLD AUTO: 6.2 K/UL
NEUTROPHILS # BLD AUTO: 6.4 K/UL
NEUTROPHILS # BLD AUTO: 6.5 K/UL
NEUTROPHILS # BLD AUTO: 6.5 K/UL
NEUTROPHILS # BLD AUTO: 6.6 K/UL
NEUTROPHILS # BLD AUTO: 6.9 K/UL
NEUTROPHILS # BLD AUTO: 7.1 K/UL
NEUTROPHILS # BLD AUTO: 7.7 K/UL
NEUTROPHILS # BLD AUTO: 7.8 K/UL
NEUTROPHILS # BLD AUTO: 7.9 K/UL
NEUTROPHILS # BLD AUTO: 8.1 K/UL
NEUTROPHILS # BLD AUTO: 8.3 K/UL
NEUTROPHILS # BLD AUTO: 9.5 K/UL
NEUTROPHILS NFR BLD: 35.4 %
NEUTROPHILS NFR BLD: 35.4 %
NEUTROPHILS NFR BLD: 38.1 %
NEUTROPHILS NFR BLD: 39.6 %
NEUTROPHILS NFR BLD: 42.9 %
NEUTROPHILS NFR BLD: 44.1 %
NEUTROPHILS NFR BLD: 44.4 %
NEUTROPHILS NFR BLD: 44.5 %
NEUTROPHILS NFR BLD: 44.6 %
NEUTROPHILS NFR BLD: 45.2 %
NEUTROPHILS NFR BLD: 45.4 %
NEUTROPHILS NFR BLD: 45.6 %
NEUTROPHILS NFR BLD: 45.9 %
NEUTROPHILS NFR BLD: 45.9 %
NEUTROPHILS NFR BLD: 46 %
NEUTROPHILS NFR BLD: 46.9 %
NEUTROPHILS NFR BLD: 47.2 %
NEUTROPHILS NFR BLD: 47.3 %
NEUTROPHILS NFR BLD: 47.3 %
NEUTROPHILS NFR BLD: 47.4 %
NEUTROPHILS NFR BLD: 47.6 %
NEUTROPHILS NFR BLD: 48.2 %
NEUTROPHILS NFR BLD: 48.8 %
NEUTROPHILS NFR BLD: 48.8 %
NEUTROPHILS NFR BLD: 49 %
NEUTROPHILS NFR BLD: 49 %
NEUTROPHILS NFR BLD: 49.2 %
NEUTROPHILS NFR BLD: 49.4 %
NEUTROPHILS NFR BLD: 50.6 %
NEUTROPHILS NFR BLD: 50.7 %
NEUTROPHILS NFR BLD: 50.9 %
NEUTROPHILS NFR BLD: 51.1 %
NEUTROPHILS NFR BLD: 51.2 %
NEUTROPHILS NFR BLD: 51.3 %
NEUTROPHILS NFR BLD: 51.6 %
NEUTROPHILS NFR BLD: 52.2 %
NEUTROPHILS NFR BLD: 52.8 %
NEUTROPHILS NFR BLD: 53.1 %
NEUTROPHILS NFR BLD: 53.6 %
NEUTROPHILS NFR BLD: 53.8 %
NEUTROPHILS NFR BLD: 53.8 %
NEUTROPHILS NFR BLD: 53.9 %
NEUTROPHILS NFR BLD: 54.1 %
NEUTROPHILS NFR BLD: 54.4 %
NEUTROPHILS NFR BLD: 54.4 %
NEUTROPHILS NFR BLD: 54.8 %
NEUTROPHILS NFR BLD: 55.1 %
NEUTROPHILS NFR BLD: 55.3 %
NEUTROPHILS NFR BLD: 55.5 %
NEUTROPHILS NFR BLD: 55.7 %
NEUTROPHILS NFR BLD: 56 %
NEUTROPHILS NFR BLD: 56.5 %
NEUTROPHILS NFR BLD: 56.6 %
NEUTROPHILS NFR BLD: 56.7 %
NEUTROPHILS NFR BLD: 57.1 %
NEUTROPHILS NFR BLD: 57.1 %
NEUTROPHILS NFR BLD: 57.4 %
NEUTROPHILS NFR BLD: 57.4 %
NEUTROPHILS NFR BLD: 57.7 %
NEUTROPHILS NFR BLD: 57.8 %
NEUTROPHILS NFR BLD: 58 %
NEUTROPHILS NFR BLD: 58.3 %
NEUTROPHILS NFR BLD: 58.9 %
NEUTROPHILS NFR BLD: 59.3 %
NEUTROPHILS NFR BLD: 59.9 %
NEUTROPHILS NFR BLD: 60 %
NEUTROPHILS NFR BLD: 60 %
NEUTROPHILS NFR BLD: 60.2 %
NEUTROPHILS NFR BLD: 60.5 %
NEUTROPHILS NFR BLD: 60.6 %
NEUTROPHILS NFR BLD: 61.1 %
NEUTROPHILS NFR BLD: 61.2 %
NEUTROPHILS NFR BLD: 61.5 %
NEUTROPHILS NFR BLD: 61.5 %
NEUTROPHILS NFR BLD: 61.8 %
NEUTROPHILS NFR BLD: 62.3 %
NEUTROPHILS NFR BLD: 62.3 %
NEUTROPHILS NFR BLD: 62.7 %
NEUTROPHILS NFR BLD: 62.9 %
NEUTROPHILS NFR BLD: 63 %
NEUTROPHILS NFR BLD: 63.2 %
NEUTROPHILS NFR BLD: 63.3 %
NEUTROPHILS NFR BLD: 63.6 %
NEUTROPHILS NFR BLD: 64.2 %
NEUTROPHILS NFR BLD: 64.4 %
NEUTROPHILS NFR BLD: 64.7 %
NEUTROPHILS NFR BLD: 64.8 %
NEUTROPHILS NFR BLD: 65 %
NEUTROPHILS NFR BLD: 65.3 %
NEUTROPHILS NFR BLD: 65.5 %
NEUTROPHILS NFR BLD: 65.6 %
NEUTROPHILS NFR BLD: 65.8 %
NEUTROPHILS NFR BLD: 65.9 %
NEUTROPHILS NFR BLD: 66.4 %
NEUTROPHILS NFR BLD: 66.6 %
NEUTROPHILS NFR BLD: 67.2 %
NEUTROPHILS NFR BLD: 67.2 %
NEUTROPHILS NFR BLD: 67.5 %
NEUTROPHILS NFR BLD: 68.1 %
NEUTROPHILS NFR BLD: 68.4 %
NEUTROPHILS NFR BLD: 68.6 %
NEUTROPHILS NFR BLD: 69.6 %
NEUTROPHILS NFR BLD: 69.6 %
NEUTROPHILS NFR BLD: 69.9 %
NEUTROPHILS NFR BLD: 70.2 %
NEUTROPHILS NFR BLD: 70.3 %
NEUTROPHILS NFR BLD: 70.5 %
NEUTROPHILS NFR BLD: 70.6 %
NEUTROPHILS NFR BLD: 70.8 %
NEUTROPHILS NFR BLD: 71.5 %
NEUTROPHILS NFR BLD: 71.7 %
NEUTROPHILS NFR BLD: 72.2 %
NEUTROPHILS NFR BLD: 73.3 %
NEUTROPHILS NFR BLD: 74.1 %
NEUTROPHILS NFR BLD: 74.2 %
NEUTROPHILS NFR BLD: 74.3 %
NEUTROPHILS NFR BLD: 75.1 %
NEUTROPHILS NFR BLD: 75.4 %
NEUTROPHILS NFR BLD: 75.4 %
NEUTROPHILS NFR BLD: 75.6 %
NEUTROPHILS NFR BLD: 76.3 %
NEUTROPHILS NFR BLD: 77.4 %
NEUTROPHILS NFR BLD: 78.6 %
NEUTROPHILS NFR BLD: 79 %
NEUTROPHILS NFR BLD: 80.2 %
NEUTROPHILS NFR BLD: 80.2 %
NEUTROPHILS NFR BLD: 81 %
NEUTROPHILS NFR BLD: 81 %
NEUTROPHILS NFR BLD: 81.2 %
NEUTROPHILS NFR BLD: 81.7 %
NEUTROPHILS NFR BLD: 82.1 %
NEUTROPHILS NFR BLD: 86.8 %
NEUTROPHILS NFR BLD: 87 %
NEUTROPHILS NFR BLD: 87 %
NEUTROPHILS NFR BLD: 87.4 %
NEUTROPHILS NFR BLD: 87.8 %
NEUTROPHILS NFR BLD: 87.9 %
NEUTROPHILS NFR BLD: 87.9 %
NEUTROPHILS NFR BLD: 88.7 %
NEUTROPHILS NFR BLD: 89.2 %
NEUTROPHILS NFR BLD: 89.9 %
NEUTROPHILS NFR BLD: 89.9 %
NEUTROPHILS NFR BLD: 91.6 %
NEUTROPHILS NFR BLD: 93.1 %
NEUTS BAND NFR BLD MANUAL: 10 %
NEUTS BAND NFR BLD MANUAL: 3 %
NEUTS BAND NFR BLD MANUAL: 3 %
NITRITE UR QL STRIP: NEGATIVE
NONHDLC SERPL-MCNC: 68 MG/DL
NRBC BLD-RTO: 0 /100 WBC
NRBC BLD-RTO: 1 /100 WBC
NRBC BLD-RTO: 2 /100 WBC
NUM UNITS TRANS PACKED RBC: NORMAL
OB PNL STL: POSITIVE
OVALOCYTES BLD QL SMEAR: ABNORMAL
PCO2 BLDA: 31.5 MMHG (ref 35–45)
PCO2 BLDA: 33.1 MMHG (ref 35–45)
PCO2 BLDA: 34.1 MMHG (ref 35–45)
PCO2 BLDA: 34.2 MMHG (ref 35–45)
PCO2 BLDA: 35.9 MMHG (ref 35–45)
PCO2 BLDA: 62.6 MMHG (ref 35–45)
PCO2 BLDA: 64.6 MMHG (ref 35–45)
PH SMN: 6.96 [PH] (ref 7.35–7.45)
PH SMN: 7.32 [PH] (ref 7.35–7.45)
PH SMN: 7.36 [PH] (ref 7.35–7.45)
PH SMN: 7.4 [PH] (ref 7.35–7.45)
PH SMN: 7.4 [PH] (ref 7.35–7.45)
PH SMN: 7.42 [PH] (ref 7.35–7.45)
PH SMN: 7.44 [PH] (ref 7.35–7.45)
PH UR STRIP: 5 [PH] (ref 5–8)
PH UR STRIP: 5 [PH] (ref 5–8)
PH UR STRIP: 7 [PH] (ref 5–8)
PHOSPHATE SERPL-MCNC: 1.1 MG/DL
PHOSPHATE SERPL-MCNC: 1.1 MG/DL
PHOSPHATE SERPL-MCNC: 1.4 MG/DL
PHOSPHATE SERPL-MCNC: 1.5 MG/DL
PHOSPHATE SERPL-MCNC: 1.5 MG/DL
PHOSPHATE SERPL-MCNC: 1.6 MG/DL
PHOSPHATE SERPL-MCNC: 1.6 MG/DL
PHOSPHATE SERPL-MCNC: 2.1 MG/DL
PHOSPHATE SERPL-MCNC: 2.1 MG/DL
PHOSPHATE SERPL-MCNC: 2.4 MG/DL
PHOSPHATE SERPL-MCNC: 2.5 MG/DL
PHOSPHATE SERPL-MCNC: 2.7 MG/DL
PHOSPHATE SERPL-MCNC: 2.7 MG/DL
PHOSPHATE SERPL-MCNC: 2.9 MG/DL
PHOSPHATE SERPL-MCNC: 3 MG/DL
PHOSPHATE SERPL-MCNC: 3.1 MG/DL
PHOSPHATE SERPL-MCNC: 3.2 MG/DL
PHOSPHATE SERPL-MCNC: 3.3 MG/DL
PHOSPHATE SERPL-MCNC: 3.4 MG/DL
PHOSPHATE SERPL-MCNC: 3.5 MG/DL
PHOSPHATE SERPL-MCNC: 3.6 MG/DL
PHOSPHATE SERPL-MCNC: 3.6 MG/DL
PHOSPHATE SERPL-MCNC: 3.7 MG/DL
PHOSPHATE SERPL-MCNC: 3.7 MG/DL
PHOSPHATE SERPL-MCNC: 3.8 MG/DL
PHOSPHATE SERPL-MCNC: 3.9 MG/DL
PHOSPHATE SERPL-MCNC: 4 MG/DL
PHOSPHATE SERPL-MCNC: 4.1 MG/DL
PHOSPHATE SERPL-MCNC: 4.4 MG/DL
PHOSPHATE SERPL-MCNC: 4.4 MG/DL
PHOSPHATE SERPL-MCNC: 4.5 MG/DL
PHOSPHATE SERPL-MCNC: 4.7 MG/DL
PISA TR MAX VEL: 2.35 M/S
PLATELET # BLD AUTO: 101 K/UL
PLATELET # BLD AUTO: 103 K/UL
PLATELET # BLD AUTO: 104 K/UL
PLATELET # BLD AUTO: 106 K/UL
PLATELET # BLD AUTO: 107 K/UL
PLATELET # BLD AUTO: 108 K/UL
PLATELET # BLD AUTO: 109 K/UL
PLATELET # BLD AUTO: 110 K/UL
PLATELET # BLD AUTO: 111 K/UL
PLATELET # BLD AUTO: 112 K/UL
PLATELET # BLD AUTO: 114 K/UL
PLATELET # BLD AUTO: 115 K/UL
PLATELET # BLD AUTO: 115 K/UL
PLATELET # BLD AUTO: 116 K/UL
PLATELET # BLD AUTO: 119 K/UL
PLATELET # BLD AUTO: 123 K/UL
PLATELET # BLD AUTO: 124 K/UL
PLATELET # BLD AUTO: 126 K/UL
PLATELET # BLD AUTO: 127 K/UL
PLATELET # BLD AUTO: 130 K/UL
PLATELET # BLD AUTO: 131 K/UL
PLATELET # BLD AUTO: 134 K/UL
PLATELET # BLD AUTO: 137 K/UL
PLATELET # BLD AUTO: 138 K/UL
PLATELET # BLD AUTO: 138 K/UL
PLATELET # BLD AUTO: 139 K/UL
PLATELET # BLD AUTO: 140 K/UL
PLATELET # BLD AUTO: 140 K/UL
PLATELET # BLD AUTO: 141 K/UL
PLATELET # BLD AUTO: 142 K/UL
PLATELET # BLD AUTO: 142 K/UL
PLATELET # BLD AUTO: 143 K/UL
PLATELET # BLD AUTO: 144 K/UL
PLATELET # BLD AUTO: 144 K/UL
PLATELET # BLD AUTO: 145 K/UL
PLATELET # BLD AUTO: 145 K/UL
PLATELET # BLD AUTO: 146 K/UL
PLATELET # BLD AUTO: 146 K/UL
PLATELET # BLD AUTO: 147 K/UL
PLATELET # BLD AUTO: 147 K/UL
PLATELET # BLD AUTO: 148 K/UL
PLATELET # BLD AUTO: 149 K/UL
PLATELET # BLD AUTO: 150 K/UL
PLATELET # BLD AUTO: 151 K/UL
PLATELET # BLD AUTO: 153 K/UL
PLATELET # BLD AUTO: 153 K/UL
PLATELET # BLD AUTO: 154 K/UL
PLATELET # BLD AUTO: 154 K/UL
PLATELET # BLD AUTO: 155 K/UL
PLATELET # BLD AUTO: 156 K/UL
PLATELET # BLD AUTO: 157 K/UL
PLATELET # BLD AUTO: 158 K/UL
PLATELET # BLD AUTO: 158 K/UL
PLATELET # BLD AUTO: 159 K/UL
PLATELET # BLD AUTO: 160 K/UL
PLATELET # BLD AUTO: 160 K/UL
PLATELET # BLD AUTO: 162 K/UL
PLATELET # BLD AUTO: 163 K/UL
PLATELET # BLD AUTO: 164 K/UL
PLATELET # BLD AUTO: 164 K/UL
PLATELET # BLD AUTO: 166 K/UL
PLATELET # BLD AUTO: 166 K/UL
PLATELET # BLD AUTO: 168 K/UL
PLATELET # BLD AUTO: 169 K/UL
PLATELET # BLD AUTO: 169 K/UL
PLATELET # BLD AUTO: 170 K/UL
PLATELET # BLD AUTO: 171 K/UL
PLATELET # BLD AUTO: 172 K/UL
PLATELET # BLD AUTO: 172 K/UL
PLATELET # BLD AUTO: 173 K/UL
PLATELET # BLD AUTO: 174 K/UL
PLATELET # BLD AUTO: 175 K/UL
PLATELET # BLD AUTO: 176 K/UL
PLATELET # BLD AUTO: 180 K/UL
PLATELET # BLD AUTO: 181 K/UL
PLATELET # BLD AUTO: 184 K/UL
PLATELET # BLD AUTO: 185 K/UL
PLATELET # BLD AUTO: 186 K/UL
PLATELET # BLD AUTO: 187 K/UL
PLATELET # BLD AUTO: 189 K/UL
PLATELET # BLD AUTO: 195 K/UL
PLATELET # BLD AUTO: 196 K/UL
PLATELET # BLD AUTO: 197 K/UL
PLATELET # BLD AUTO: 199 K/UL
PLATELET # BLD AUTO: 199 K/UL
PLATELET # BLD AUTO: 200 K/UL
PLATELET # BLD AUTO: 201 K/UL
PLATELET # BLD AUTO: 201 K/UL
PLATELET # BLD AUTO: 204 K/UL
PLATELET # BLD AUTO: 205 K/UL
PLATELET # BLD AUTO: 206 K/UL
PLATELET # BLD AUTO: 208 K/UL
PLATELET # BLD AUTO: 210 K/UL
PLATELET # BLD AUTO: 212 K/UL
PLATELET # BLD AUTO: 217 K/UL
PLATELET # BLD AUTO: 218 K/UL
PLATELET # BLD AUTO: 220 K/UL
PLATELET # BLD AUTO: 221 K/UL
PLATELET # BLD AUTO: 226 K/UL
PLATELET # BLD AUTO: 228 K/UL
PLATELET # BLD AUTO: 233 K/UL
PLATELET # BLD AUTO: 234 K/UL
PLATELET # BLD AUTO: 235 K/UL
PLATELET # BLD AUTO: 241 K/ΜL (ref 150–399)
PLATELET # BLD AUTO: 242 K/UL
PLATELET # BLD AUTO: 242 K/UL
PLATELET # BLD AUTO: 247 K/UL
PLATELET # BLD AUTO: 258 K/UL
PLATELET # BLD AUTO: 331 K/UL
PLATELET # BLD AUTO: 331 K/UL
PLATELET # BLD AUTO: 338 K/UL
PLATELET # BLD AUTO: 344 K/UL
PLATELET # BLD AUTO: 373 K/UL
PLATELET # BLD AUTO: 374 K/UL
PLATELET # BLD AUTO: 374 K/UL
PLATELET # BLD AUTO: 383 K/UL
PLATELET # BLD AUTO: 90 K/UL
PLATELET # BLD AUTO: 94 K/UL
PLATELET # BLD AUTO: 98 K/UL
PLATELET BLD QL SMEAR: ABNORMAL
PMV BLD AUTO: 10 FL
PMV BLD AUTO: 10.1 FL
PMV BLD AUTO: 10.1 FL
PMV BLD AUTO: 10.2 FL
PMV BLD AUTO: 10.3 FL
PMV BLD AUTO: 10.4 FL
PMV BLD AUTO: 10.5 FL
PMV BLD AUTO: 10.6 FL
PMV BLD AUTO: 10.7 FL
PMV BLD AUTO: 10.8 FL
PMV BLD AUTO: 10.9 FL
PMV BLD AUTO: 11 FL
PMV BLD AUTO: 11.1 FL
PMV BLD AUTO: 11.2 FL
PMV BLD AUTO: 11.3 FL
PMV BLD AUTO: 11.4 FL
PMV BLD AUTO: 11.5 FL
PMV BLD AUTO: 11.6 FL
PMV BLD AUTO: 11.7 FL
PMV BLD AUTO: 11.8 FL
PMV BLD AUTO: 12 FL
PMV BLD AUTO: 12.3 FL
PMV BLD AUTO: 12.3 FL
PMV BLD AUTO: 12.4 FL
PMV BLD AUTO: 12.6 FL
PMV BLD AUTO: 12.6 FL
PMV BLD AUTO: 12.7 FL
PMV BLD AUTO: 12.7 FL
PMV BLD AUTO: 9.4 FL
PMV BLD AUTO: 9.6 FL
PMV BLD AUTO: 9.6 FL
PMV BLD AUTO: 9.8 FL
PMV BLD AUTO: 9.8 FL
PMV BLD AUTO: ABNORMAL FL
PO2 BLDA: 116 MMHG (ref 80–100)
PO2 BLDA: 140 MMHG (ref 80–100)
PO2 BLDA: 21 MMHG (ref 40–60)
PO2 BLDA: 238 MMHG (ref 80–100)
PO2 BLDA: 27 MMHG (ref 40–60)
PO2 BLDA: 31 MMHG (ref 40–60)
PO2 BLDA: 58 MMHG (ref 80–100)
POC BE: -17 MMOL/L
POC BE: -4 MMOL/L
POC BE: -6 MMOL/L
POC BE: -6 MMOL/L
POC BE: -9 MMOL/L
POC BE: 0 MMOL/L
POC BE: 16 MMOL/L
POC IONIZED CALCIUM: 0.75 MMOL/L (ref 1.06–1.42)
POC SATURATED O2: 100 % (ref 95–100)
POC SATURATED O2: 35 % (ref 95–100)
POC SATURATED O2: 50 % (ref 95–100)
POC SATURATED O2: 60 % (ref 95–100)
POC SATURATED O2: 71 % (ref 95–100)
POC SATURATED O2: 99 % (ref 95–100)
POC SATURATED O2: 99 % (ref 95–100)
POC TCO2: 17 MMOL/L (ref 23–27)
POC TCO2: 18 MMOL/L (ref 23–27)
POC TCO2: 20 MMOL/L (ref 24–29)
POC TCO2: 20 MMOL/L (ref 24–29)
POC TCO2: 22 MMOL/L (ref 24–29)
POC TCO2: 25 MMOL/L (ref 23–27)
POC TCO2: 42 MMOL/L (ref 23–27)
POCT GLUCOSE: 100 MG/DL (ref 70–110)
POCT GLUCOSE: 101 MG/DL (ref 70–110)
POCT GLUCOSE: 103 MG/DL (ref 70–110)
POCT GLUCOSE: 104 MG/DL (ref 70–110)
POCT GLUCOSE: 106 MG/DL (ref 70–110)
POCT GLUCOSE: 110 MG/DL (ref 70–110)
POCT GLUCOSE: 112 MG/DL (ref 70–110)
POCT GLUCOSE: 112 MG/DL (ref 70–110)
POCT GLUCOSE: 118 MG/DL (ref 70–110)
POCT GLUCOSE: 121 MG/DL (ref 70–110)
POCT GLUCOSE: 121 MG/DL (ref 70–110)
POCT GLUCOSE: 123 MG/DL (ref 70–110)
POCT GLUCOSE: 127 MG/DL (ref 70–110)
POCT GLUCOSE: 133 MG/DL (ref 70–110)
POCT GLUCOSE: 138 MG/DL (ref 70–110)
POCT GLUCOSE: 141 MG/DL (ref 70–110)
POCT GLUCOSE: 80 MG/DL (ref 70–110)
POCT GLUCOSE: 81 MG/DL (ref 70–110)
POCT GLUCOSE: 87 MG/DL (ref 70–110)
POCT GLUCOSE: 87 MG/DL (ref 70–110)
POCT GLUCOSE: 91 MG/DL (ref 70–110)
POCT GLUCOSE: 93 MG/DL (ref 70–110)
POCT GLUCOSE: 95 MG/DL (ref 70–110)
POCT GLUCOSE: 96 MG/DL (ref 70–110)
POCT GLUCOSE: 97 MG/DL (ref 70–110)
POCT GLUCOSE: 98 MG/DL (ref 70–110)
POCT GLUCOSE: <20 MG/DL (ref 70–110)
POCT GLUCOSE: >500 MG/DL (ref 70–110)
POIKILOCYTOSIS BLD QL SMEAR: ABNORMAL
POIKILOCYTOSIS BLD QL SMEAR: SLIGHT
POLYCHROMASIA BLD QL SMEAR: ABNORMAL
POTASSIUM BLD-SCNC: 6.8 MMOL/L (ref 3.5–5.1)
POTASSIUM SERPL-SCNC: 3.1 MMOL/L
POTASSIUM SERPL-SCNC: 3.2 MMOL/L
POTASSIUM SERPL-SCNC: 3.3 MMOL/L
POTASSIUM SERPL-SCNC: 3.4 MMOL/L
POTASSIUM SERPL-SCNC: 3.5 MMOL/L
POTASSIUM SERPL-SCNC: 3.6 MMOL/L
POTASSIUM SERPL-SCNC: 3.7 MMOL/L
POTASSIUM SERPL-SCNC: 3.8 MMOL/L
POTASSIUM SERPL-SCNC: 3.9 MMOL/L
POTASSIUM SERPL-SCNC: 4 MMOL/L
POTASSIUM SERPL-SCNC: 4.1 MMOL/L
POTASSIUM SERPL-SCNC: 4.2 MMOL/L
POTASSIUM SERPL-SCNC: 4.3 MMOL/L
POTASSIUM SERPL-SCNC: 4.4 MMOL/L
POTASSIUM SERPL-SCNC: 4.5 MMOL/L
POTASSIUM SERPL-SCNC: 4.6 MMOL/L
POTASSIUM SERPL-SCNC: 4.7 MMOL/L
POTASSIUM SERPL-SCNC: 4.8 MMOL/L
POTASSIUM SERPL-SCNC: 4.9 MMOL/L
POTASSIUM SERPL-SCNC: 5 MMOL/L
POTASSIUM SERPL-SCNC: 5 MMOL/L
POTASSIUM SERPL-SCNC: 5.1 MMOL/L
POTASSIUM SERPL-SCNC: 5.2 MMOL/L
POTASSIUM SERPL-SCNC: 5.5 MMOL/L
POTASSIUM SERPL-SCNC: 6.2 MMOL/L
POTASSIUM: 3.8
PRE FEV1 FVC: 74
PRE FEV1: 2.43
PRE FVC: 3.3
PREALB SERPL-MCNC: 11 MG/DL
PREALB SERPL-MCNC: 13 MG/DL
PREALB SERPL-MCNC: 14 MG/DL
PREALB SERPL-MCNC: 15 MG/DL
PREALB SERPL-MCNC: 16 MG/DL
PREALB SERPL-MCNC: 17 MG/DL
PREALB SERPL-MCNC: 17 MG/DL
PREALB SERPL-MCNC: 18 MG/DL
PREALB SERPL-MCNC: 19 MG/DL
PREALB SERPL-MCNC: 20 MG/DL
PREALB SERPL-MCNC: 21 MG/DL
PREALB SERPL-MCNC: 21 MG/DL
PREALB SERPL-MCNC: 22 MG/DL
PREALB SERPL-MCNC: 23 MG/DL
PREALB SERPL-MCNC: 23 MG/DL
PREALB SERPL-MCNC: 24 MG/DL
PREALB SERPL-MCNC: 24 MG/DL
PREALB SERPL-MCNC: 5 MG/DL
PREALB SERPL-MCNC: 5 MG/DL
PREALB SERPL-MCNC: 6 MG/DL
PREDICTED FEV1 FVC: 80
PREDICTED FEV1: 2.83
PREDICTED FVC: 3.54
PROCALCITONIN SERPL IA-MCNC: 0.03 NG/ML
PROCALCITONIN SERPL IA-MCNC: 2.51 NG/ML
PROT SERPL-MCNC: 4.6 G/DL
PROT SERPL-MCNC: 4.6 G/DL
PROT SERPL-MCNC: 4.9 G/DL
PROT SERPL-MCNC: 5 G/DL
PROT SERPL-MCNC: 5.1 G/DL
PROT SERPL-MCNC: 5.1 G/DL
PROT SERPL-MCNC: 5.2 G/DL
PROT SERPL-MCNC: 5.2 G/DL
PROT SERPL-MCNC: 5.4 G/DL
PROT SERPL-MCNC: 5.5 G/DL
PROT SERPL-MCNC: 5.5 G/DL
PROT SERPL-MCNC: 5.7 G/DL
PROT SERPL-MCNC: 5.9 G/DL
PROT SERPL-MCNC: 6 G/DL
PROT SERPL-MCNC: 6 G/DL
PROT SERPL-MCNC: 6.1 G/DL
PROT SERPL-MCNC: 6.2 G/DL
PROT SERPL-MCNC: 6.2 G/DL
PROT SERPL-MCNC: 6.3 G/DL
PROT SERPL-MCNC: 6.4 G/DL
PROT SERPL-MCNC: 6.5 G/DL
PROT SERPL-MCNC: 6.6 G/DL
PROT SERPL-MCNC: 6.7 G/DL
PROT SERPL-MCNC: 6.8 G/DL
PROT SERPL-MCNC: 6.8 G/DL
PROT SERPL-MCNC: 6.9 G/DL
PROT SERPL-MCNC: 6.9 G/DL
PROT SERPL-MCNC: 7 G/DL
PROT SERPL-MCNC: 7 G/DL
PROT SERPL-MCNC: 7.1 G/DL
PROT SERPL-MCNC: 7.2 G/DL
PROT SERPL-MCNC: 7.5 G/DL
PROT SERPL-MCNC: 7.5 G/DL
PROT SERPL-MCNC: 7.6 G/DL
PROT SERPL-MCNC: 7.8 G/DL
PROT SERPL-MCNC: 7.9 G/DL
PROT UR QL STRIP: ABNORMAL
PROT UR QL STRIP: NEGATIVE
PROT UR QL STRIP: NEGATIVE
PROTHROMBIN TIME: 10.8 SEC
PROTHROMBIN TIME: 10.8 SEC
PROTHROMBIN TIME: 11.1 SEC
PROTHROMBIN TIME: 11.1 SEC
PROTHROMBIN TIME: 11.2 SEC
PROTHROMBIN TIME: 11.3 SEC
PROTHROMBIN TIME: 11.4 SEC
PROTHROMBIN TIME: 11.6 SEC
PROTHROMBIN TIME: 11.6 SEC
PROTHROMBIN TIME: 11.7 SEC
PROTHROMBIN TIME: 11.7 SEC
PROTHROMBIN TIME: 11.8 SEC
PROTHROMBIN TIME: 12 SEC
PROTHROMBIN TIME: 12.2 SEC
PROTHROMBIN TIME: 12.2 SEC
PROTHROMBIN TIME: 12.8 SEC
PROTHROMBIN TIME: 13.4 SEC
PROTHROMBIN TIME: 13.6 SEC
PROTHROMBIN TIME: 13.8 SEC
PROTHROMBIN TIME: 13.8 SEC
PROTHROMBIN TIME: 14.1 SEC
PROTHROMBIN TIME: 14.1 SEC
PROTHROMBIN TIME: 14.5 SEC
PROTHROMBIN TIME: 14.6 SEC
PROTHROMBIN TIME: 14.6 SEC
PROTHROMBIN TIME: 14.8 SEC
PROTHROMBIN TIME: 15.4 SEC
PROTHROMBIN TIME: 15.5 SEC
PROTHROMBIN TIME: 15.7 SEC
PROTHROMBIN TIME: 16 SEC
PROTHROMBIN TIME: 16.1 SEC
PROTHROMBIN TIME: 16.4 SEC
PROTHROMBIN TIME: 16.5 SEC
PROTHROMBIN TIME: 16.8 SEC
PROTHROMBIN TIME: 16.9 SEC
PROTHROMBIN TIME: 17 SEC
PROTHROMBIN TIME: 17.2 SEC
PROTHROMBIN TIME: 17.6 SEC
PROTHROMBIN TIME: 17.6 SEC
PROTHROMBIN TIME: 17.7 SEC
PROTHROMBIN TIME: 18 SEC
PROTHROMBIN TIME: 18.3 SEC
PROTHROMBIN TIME: 18.6 SEC
PROTHROMBIN TIME: 18.7 SEC
PROTHROMBIN TIME: 18.9 SEC
PROTHROMBIN TIME: 19.1 SEC
PROTHROMBIN TIME: 19.2 SEC
PROTHROMBIN TIME: 19.2 SEC
PROTHROMBIN TIME: 19.4 SEC
PROTHROMBIN TIME: 19.6 SEC
PROTHROMBIN TIME: 20 SEC
PROTHROMBIN TIME: 20.1 SEC
PROTHROMBIN TIME: 20.1 SEC
PROTHROMBIN TIME: 20.5 SEC
PROTHROMBIN TIME: 20.7 SEC
PROTHROMBIN TIME: 20.8 SEC
PROTHROMBIN TIME: 20.9 SEC
PROTHROMBIN TIME: 21.5 SEC
PROTHROMBIN TIME: 22 SEC
PROTHROMBIN TIME: 22.1 SEC
PROTHROMBIN TIME: 22.9 SEC
PROTHROMBIN TIME: 23 SEC
PROTHROMBIN TIME: 23 SEC
PROTHROMBIN TIME: 23.2 SEC
PROTHROMBIN TIME: 23.4 SEC
PROTHROMBIN TIME: 23.9 SEC
PROTHROMBIN TIME: 24.1 SEC
PROTHROMBIN TIME: 24.5 SEC
PROTHROMBIN TIME: 25.2 SEC
PROTHROMBIN TIME: 25.3 SEC
PROTHROMBIN TIME: 25.4 SEC
PROTHROMBIN TIME: 25.4 SEC
PROTHROMBIN TIME: 25.5 SEC
PROTHROMBIN TIME: 25.5 SEC
PROTHROMBIN TIME: 25.8 SEC
PROTHROMBIN TIME: 26.6 SEC
PROTHROMBIN TIME: 27.1 SEC
PROTHROMBIN TIME: 27.3 SEC
PROTHROMBIN TIME: 27.5 SEC
PROTHROMBIN TIME: 27.7 SEC
PROTHROMBIN TIME: 28.7 SEC
PROTHROMBIN TIME: 29.3 SEC
PROTHROMBIN TIME: 29.3 SEC
PROTHROMBIN TIME: 29.6 SEC
PROTHROMBIN TIME: 30.1 SEC
PROTHROMBIN TIME: 30.4 SEC
PROTHROMBIN TIME: 30.6 SEC
PROTHROMBIN TIME: 30.6 SEC
PROTHROMBIN TIME: 31.3 SEC
PROTHROMBIN TIME: 32.2 SEC
PROTHROMBIN TIME: 33.4 SEC
PROTHROMBIN TIME: 33.6 SEC
PROTHROMBIN TIME: 34.5 SEC
PROTHROMBIN TIME: 34.8 SEC
PROTHROMBIN TIME: 34.8 SEC
PROTHROMBIN TIME: 35.1 SEC
PROTHROMBIN TIME: 35.4 SEC
PROTHROMBIN TIME: 35.7 SEC
PROTHROMBIN TIME: 35.9 SEC
PROTHROMBIN TIME: 36.4 SEC
PROTHROMBIN TIME: 37.3 SEC
PROTHROMBIN TIME: 37.8 SEC
PROTHROMBIN TIME: 38.3 SEC
PROTHROMBIN TIME: 38.4 SEC
PROTHROMBIN TIME: 38.6 SEC
PROTHROMBIN TIME: 39.5 SEC
PROTHROMBIN TIME: 39.5 SEC
PROTHROMBIN TIME: 39.8 SEC
PROTHROMBIN TIME: 40.8 SEC
PROTHROMBIN TIME: 40.9 SEC
PROTHROMBIN TIME: 42.4 SEC
PROTHROMBIN TIME: 42.4 SEC
PROTHROMBIN TIME: 43 SEC
PROTHROMBIN TIME: 43.7 SEC
PROTHROMBIN TIME: 45 SEC
PROTHROMBIN TIME: 49.5 SEC
PROTHROMBIN TIME: 69.3 SEC
PULM VEIN S/D RATIO: 0.33
PV PEAK D VEL: 0.45 M/S
PV PEAK S VEL: 0.15 M/S
RA MAJOR: 6.13 CM
RA WIDTH: 4.08 CM
RBC # BLD AUTO: 1.51 M/UL
RBC # BLD AUTO: 1.69 M/UL
RBC # BLD AUTO: 1.75 M/UL
RBC # BLD AUTO: 2.01 M/UL
RBC # BLD AUTO: 2.22 M/UL
RBC # BLD AUTO: 2.23 M/UL
RBC # BLD AUTO: 2.24 M/UL
RBC # BLD AUTO: 2.25 M/UL
RBC # BLD AUTO: 2.32 M/UL
RBC # BLD AUTO: 2.32 M/UL
RBC # BLD AUTO: 2.41 M/UL
RBC # BLD AUTO: 2.43 M/UL
RBC # BLD AUTO: 2.43 M/UL
RBC # BLD AUTO: 2.45 M/UL
RBC # BLD AUTO: 2.48 M/UL
RBC # BLD AUTO: 2.5 M/UL
RBC # BLD AUTO: 2.51 M/UL
RBC # BLD AUTO: 2.56 M/UL
RBC # BLD AUTO: 2.56 M/UL
RBC # BLD AUTO: 2.57 M/UL
RBC # BLD AUTO: 2.58 M/UL
RBC # BLD AUTO: 2.59 M/UL
RBC # BLD AUTO: 2.6 M/UL
RBC # BLD AUTO: 2.6 M/UL
RBC # BLD AUTO: 2.61 M/UL
RBC # BLD AUTO: 2.62 M/UL
RBC # BLD AUTO: 2.64 M/UL
RBC # BLD AUTO: 2.65 M/UL
RBC # BLD AUTO: 2.65 M/UL
RBC # BLD AUTO: 2.66 M/UL
RBC # BLD AUTO: 2.68 M/UL
RBC # BLD AUTO: 2.69 M/UL
RBC # BLD AUTO: 2.7 M/UL
RBC # BLD AUTO: 2.71 M/UL
RBC # BLD AUTO: 2.71 M/UL
RBC # BLD AUTO: 2.72 M/UL
RBC # BLD AUTO: 2.73 M/UL
RBC # BLD AUTO: 2.75 M/UL
RBC # BLD AUTO: 2.76 M/UL
RBC # BLD AUTO: 2.77 M/UL
RBC # BLD AUTO: 2.78 M/UL
RBC # BLD AUTO: 2.79 M/UL
RBC # BLD AUTO: 2.79 M/UL
RBC # BLD AUTO: 2.81 M/UL
RBC # BLD AUTO: 2.82 M/UL
RBC # BLD AUTO: 2.83 M/UL
RBC # BLD AUTO: 2.84 M/UL
RBC # BLD AUTO: 2.85 M/UL
RBC # BLD AUTO: 2.85 M/UL
RBC # BLD AUTO: 2.86 M/UL
RBC # BLD AUTO: 2.86 M/UL
RBC # BLD AUTO: 2.88 M/UL
RBC # BLD AUTO: 2.89 M/UL
RBC # BLD AUTO: 2.9 M/UL
RBC # BLD AUTO: 2.91 M/UL
RBC # BLD AUTO: 2.91 M/UL
RBC # BLD AUTO: 2.94 10*6/UL
RBC # BLD AUTO: 2.94 M/UL
RBC # BLD AUTO: 2.95 M/UL
RBC # BLD AUTO: 2.96 M/UL
RBC # BLD AUTO: 2.97 M/UL
RBC # BLD AUTO: 2.98 M/UL
RBC # BLD AUTO: 2.99 M/UL
RBC # BLD AUTO: 3 M/UL
RBC # BLD AUTO: 3.01 M/UL
RBC # BLD AUTO: 3.01 M/UL
RBC # BLD AUTO: 3.02 M/UL
RBC # BLD AUTO: 3.03 M/UL
RBC # BLD AUTO: 3.04 M/UL
RBC # BLD AUTO: 3.07 M/UL
RBC # BLD AUTO: 3.08 M/UL
RBC # BLD AUTO: 3.1 M/UL
RBC # BLD AUTO: 3.11 M/UL
RBC # BLD AUTO: 3.12 10*6/UL
RBC # BLD AUTO: 3.12 M/UL
RBC # BLD AUTO: 3.12 M/UL
RBC # BLD AUTO: 3.14 M/UL
RBC # BLD AUTO: 3.15 M/UL
RBC # BLD AUTO: 3.17 M/UL
RBC # BLD AUTO: 3.18 M/UL
RBC # BLD AUTO: 3.2 M/UL
RBC # BLD AUTO: 3.21 M/UL
RBC # BLD AUTO: 3.24 M/UL
RBC # BLD AUTO: 3.25 M/UL
RBC # BLD AUTO: 3.27 M/UL
RBC # BLD AUTO: 3.27 M/UL
RBC # BLD AUTO: 3.28 M/UL
RBC # BLD AUTO: 3.35 M/UL
RBC # BLD AUTO: 3.38 M/UL
RBC # BLD AUTO: 3.43 M/UL
RBC # BLD AUTO: 3.47 M/UL
RBC # BLD AUTO: 3.48 M/UL
RBC # BLD AUTO: 3.52 M/UL
RBC # BLD AUTO: 3.53 M/UL
RBC # BLD AUTO: 3.55 M/UL
RBC # BLD AUTO: 3.55 M/UL
RBC # BLD AUTO: 3.59 M/UL
RBC # BLD AUTO: 3.62 M/UL
RBC # BLD AUTO: 3.66 M/UL
RBC # BLD AUTO: 3.7 M/UL
RBC # BLD AUTO: 3.71 M/UL
RBC # BLD AUTO: 3.71 M/UL
RBC # BLD AUTO: 3.8 M/UL
RBC # BLD AUTO: 3.82 M/UL
RBC # BLD AUTO: 3.83 M/UL
RBC # BLD AUTO: 3.84 M/UL
RBC # BLD AUTO: 3.86 M/UL
RBC # BLD AUTO: 3.89 M/UL
RBC # BLD AUTO: 3.9 M/UL
RBC # BLD AUTO: 3.9 M/UL
RBC # BLD AUTO: 3.91 M/UL
RBC # BLD AUTO: 3.95 M/UL
RBC # BLD AUTO: 4.01 M/UL
RBC # BLD AUTO: 4.04 M/UL
RBC # BLD AUTO: 4.06 M/UL
RBC # BLD AUTO: 4.08 M/UL
RBC # BLD AUTO: 4.31 M/UL
RBC # BLD AUTO: 4.31 M/UL
RBC # BLD AUTO: 4.32 M/UL
RBC # BLD AUTO: 4.42 M/UL
RBC # BLD AUTO: 4.77 M/UL
RBC # BLD AUTO: 4.78 M/UL
RBC # BLD AUTO: 5.05 M/UL
RBC #/AREA URNS AUTO: 9 /HPF (ref 0–4)
RETIRED EF AND QEF - SEE NOTES: 10 (ref 55–65)
RETIRED EF AND QEF - SEE NOTES: 15 (ref 55–65)
RETIRED EF AND QEF - SEE NOTES: 20 (ref 55–65)
RETIRED EF AND QEF - SEE NOTES: 20 (ref 55–65)
RETIRED EF AND QEF - SEE NOTES: 8 (ref 55–65)
RIGHT VENTRICULAR END-DIASTOLIC DIMENSION: 5 CM
SAMPLE: ABNORMAL
SATURATED IRON: 4 %
SATURATED IRON: 6 %
SATURATED IRON: 9 %
SCHISTOCYTES BLD QL SMEAR: ABNORMAL
SCHISTOCYTES BLD QL SMEAR: PRESENT
SINUS: 3.14 CM
SITE: ABNORMAL
SMUDGE CELLS BLD QL SMEAR: PRESENT
SODIUM BLD-SCNC: 140 MMOL/L (ref 136–145)
SODIUM SERPL-SCNC: 125 MMOL/L
SODIUM SERPL-SCNC: 127 MMOL/L
SODIUM SERPL-SCNC: 127 MMOL/L
SODIUM SERPL-SCNC: 128 MMOL/L
SODIUM SERPL-SCNC: 129 MMOL/L
SODIUM SERPL-SCNC: 130 MMOL/L
SODIUM SERPL-SCNC: 131 MMOL/L
SODIUM SERPL-SCNC: 132 MMOL/L
SODIUM SERPL-SCNC: 133 MMOL/L
SODIUM SERPL-SCNC: 134 MMOL/L
SODIUM SERPL-SCNC: 135 MMOL/L
SODIUM SERPL-SCNC: 136 MMOL/L
SODIUM SERPL-SCNC: 137 MMOL/L
SODIUM SERPL-SCNC: 138 MMOL/L
SODIUM SERPL-SCNC: 139 MMOL/L
SODIUM SERPL-SCNC: 140 MMOL/L
SODIUM SERPL-SCNC: 141 MMOL/L
SODIUM SERPL-SCNC: 142 MMOL/L
SODIUM SERPL-SCNC: 144 MMOL/L
SODIUM SERPL-SCNC: 144 MMOL/L
SODIUM SERPL-SCNC: 146 MMOL/L
SODIUM SERPL-SCNC: 146 MMOL/L
SODIUM: 137
SP GR UR STRIP: 1.01 (ref 1–1.03)
SP GR UR STRIP: 1.01 (ref 1–1.03)
SP GR UR STRIP: 1.03 (ref 1–1.03)
SP02: 100
SP02: 98
SPHEROCYTES BLD QL SMEAR: ABNORMAL
SQUAMOUS #/AREA URNS AUTO: 1 /HPF
STJ: 2.77 CM
STJ: 2.9 CM
STOMATOCYTES BLD QL SMEAR: PRESENT
TARGETS BLD QL SMEAR: ABNORMAL
TDI LATERAL: 0.06
TDI SEPTAL: 0.04
TDI: 0.05
TOTAL IRON BINDING CAPACITY: 342 UG/DL
TOTAL IRON BINDING CAPACITY: 363 UG/DL
TOTAL IRON BINDING CAPACITY: 363 UG/DL
TOTAL IRON BINDING CAPACITY: 377 UG/DL
TOTAL IRON BINDING CAPACITY: 397 UG/DL
TR MAX PG: 22.09 MMHG
TRANSFERRIN SERPL-MCNC: 231 MG/DL
TRANSFERRIN SERPL-MCNC: 245 MG/DL
TRANSFERRIN SERPL-MCNC: 255 MG/DL
TRANSFERRIN SERPL-MCNC: 268 MG/DL
TRICUSPID ANNULAR PLANE SYSTOLIC EXCURSION: 0.89 CM
TRICUSPID VALVE REGURGITATION: ABNORMAL
TRIGL SERPL-MCNC: 62 MG/DL
TROPONIN I SERPL DL<=0.01 NG/ML-MCNC: 0.01 NG/ML
TROPONIN I SERPL DL<=0.01 NG/ML-MCNC: 0.01 NG/ML
TROPONIN I SERPL DL<=0.01 NG/ML-MCNC: 0.03 NG/ML
TROPONIN I SERPL DL<=0.01 NG/ML-MCNC: 0.06 NG/ML
TROPONIN I SERPL DL<=0.01 NG/ML-MCNC: 0.16 NG/ML
TSH SERPL DL<=0.005 MIU/L-ACNC: 3.35 UIU/ML
URATE SERPL-MCNC: 7.3 MG/DL
URN SPEC COLLECT METH UR: ABNORMAL
URN SPEC COLLECT METH UR: NORMAL
URN SPEC COLLECT METH UR: NORMAL
UROBILINOGEN UR STRIP-ACNC: ABNORMAL EU/DL
UROBILINOGEN UR STRIP-ACNC: NEGATIVE EU/DL
UROBILINOGEN UR STRIP-ACNC: NEGATIVE EU/DL
VANCOMYCIN TROUGH SERPL-MCNC: 13.1 UG/ML
VANCOMYCIN TROUGH SERPL-MCNC: 14.5 UG/ML
VANCOMYCIN TROUGH SERPL-MCNC: 14.6 UG/ML
VANCOMYCIN TROUGH SERPL-MCNC: 14.9 UG/ML
VANCOMYCIN TROUGH SERPL-MCNC: 16.4 UG/ML
VANCOMYCIN TROUGH SERPL-MCNC: 16.9 UG/ML
VANCOMYCIN TROUGH SERPL-MCNC: 17.4 UG/ML
VANCOMYCIN TROUGH SERPL-MCNC: 21.4 UG/ML
VANCOMYCIN TROUGH SERPL-MCNC: 23.1 UG/ML
WBC # BLD AUTO: 10.03 K/UL
WBC # BLD AUTO: 10.07 K/UL
WBC # BLD AUTO: 10.48 K/UL
WBC # BLD AUTO: 10.63 K/UL
WBC # BLD AUTO: 10.69 K/UL
WBC # BLD AUTO: 10.73 K/UL
WBC # BLD AUTO: 10.76 K/UL
WBC # BLD AUTO: 10.89 K/UL
WBC # BLD AUTO: 10.97 K/UL
WBC # BLD AUTO: 12.17 K/UL
WBC # BLD AUTO: 13.41 K/UL
WBC # BLD AUTO: 13.5 K/UL
WBC # BLD AUTO: 14.46 K/UL
WBC # BLD AUTO: 14.72 K/UL
WBC # BLD AUTO: 15.23 K/UL
WBC # BLD AUTO: 15.74 K/UL
WBC # BLD AUTO: 17.56 K/UL
WBC # BLD AUTO: 17.56 K/UL
WBC # BLD AUTO: 18.33 K/UL
WBC # BLD AUTO: 18.33 K/UL
WBC # BLD AUTO: 18.52 K/UL
WBC # BLD AUTO: 19.66 K/UL
WBC # BLD AUTO: 20.64 K/UL
WBC # BLD AUTO: 22.36 K/UL
WBC # BLD AUTO: 3.8 K/UL
WBC # BLD AUTO: 3.83 K/UL
WBC # BLD AUTO: 3.88 K/UL
WBC # BLD AUTO: 3.98 K/UL
WBC # BLD AUTO: 34.99 K/UL
WBC # BLD AUTO: 37.56 K/UL
WBC # BLD AUTO: 4.43 K/UL
WBC # BLD AUTO: 4.47 K/UL
WBC # BLD AUTO: 4.51 K/UL
WBC # BLD AUTO: 4.53 K/UL
WBC # BLD AUTO: 4.67 K/UL
WBC # BLD AUTO: 4.7 K/UL
WBC # BLD AUTO: 4.76 K/UL
WBC # BLD AUTO: 4.84 K/UL
WBC # BLD AUTO: 4.89 K/UL
WBC # BLD AUTO: 43.37 K/UL
WBC # BLD AUTO: 44.05 K/UL
WBC # BLD AUTO: 44.05 K/UL
WBC # BLD AUTO: 5 K/UL
WBC # BLD AUTO: 5 K/UL
WBC # BLD AUTO: 5.07 K/UL
WBC # BLD AUTO: 5.07 K/UL
WBC # BLD AUTO: 5.08 K/UL
WBC # BLD AUTO: 5.09 K/UL
WBC # BLD AUTO: 5.11 K/UL
WBC # BLD AUTO: 5.12 K/UL
WBC # BLD AUTO: 5.12 K/UL
WBC # BLD AUTO: 5.13 K/UL
WBC # BLD AUTO: 5.13 K/UL
WBC # BLD AUTO: 5.19 K/UL
WBC # BLD AUTO: 5.2 K/UL
WBC # BLD AUTO: 5.22 K/UL
WBC # BLD AUTO: 5.24 K/UL
WBC # BLD AUTO: 5.26 K/UL
WBC # BLD AUTO: 5.27 K/UL
WBC # BLD AUTO: 5.34 K/UL
WBC # BLD AUTO: 5.4 K/UL
WBC # BLD AUTO: 5.43 K/UL
WBC # BLD AUTO: 5.46 K/UL
WBC # BLD AUTO: 5.47 K/UL
WBC # BLD AUTO: 5.49 K/UL
WBC # BLD AUTO: 5.52 K/UL
WBC # BLD AUTO: 5.53 K/UL
WBC # BLD AUTO: 5.56 K/UL
WBC # BLD AUTO: 5.58 K/UL
WBC # BLD AUTO: 5.61 K/UL
WBC # BLD AUTO: 5.61 K/UL
WBC # BLD AUTO: 5.63 K/UL
WBC # BLD AUTO: 5.63 K/UL
WBC # BLD AUTO: 5.65 K/UL
WBC # BLD AUTO: 5.66 K/UL
WBC # BLD AUTO: 5.67 K/UL
WBC # BLD AUTO: 5.72 K/UL
WBC # BLD AUTO: 5.73 K/UL
WBC # BLD AUTO: 5.75 K/UL
WBC # BLD AUTO: 5.79 K/UL
WBC # BLD AUTO: 5.87 K/UL
WBC # BLD AUTO: 5.89 K/UL
WBC # BLD AUTO: 5.9 K/UL
WBC # BLD AUTO: 5.94 K/UL
WBC # BLD AUTO: 5.95 K/UL
WBC # BLD AUTO: 5.99 K/UL
WBC # BLD AUTO: 51.76 K/UL
WBC # BLD AUTO: 52.45 K/UL
WBC # BLD AUTO: 52.45 K/UL
WBC # BLD AUTO: 6.02 K/UL
WBC # BLD AUTO: 6.07 K/UL
WBC # BLD AUTO: 6.08 K/UL
WBC # BLD AUTO: 6.14 K/UL
WBC # BLD AUTO: 6.17 K/UL
WBC # BLD AUTO: 6.23 K/UL
WBC # BLD AUTO: 6.28 K/UL
WBC # BLD AUTO: 6.29 K/UL
WBC # BLD AUTO: 6.35 K/UL
WBC # BLD AUTO: 6.36 K/UL
WBC # BLD AUTO: 6.39 K/UL
WBC # BLD AUTO: 6.39 K/UL
WBC # BLD AUTO: 6.45 K/UL
WBC # BLD AUTO: 6.45 K/UL
WBC # BLD AUTO: 6.54 K/UL
WBC # BLD AUTO: 6.57 K/UL
WBC # BLD AUTO: 6.59 K/UL
WBC # BLD AUTO: 6.64 K/UL
WBC # BLD AUTO: 6.73 K/UL
WBC # BLD AUTO: 6.76 K/UL
WBC # BLD AUTO: 6.86 K/UL
WBC # BLD AUTO: 6.93 K/UL
WBC # BLD AUTO: 6.94 K/UL
WBC # BLD AUTO: 6.97 K/UL
WBC # BLD AUTO: 6.98 K/UL
WBC # BLD AUTO: 7.12 K/UL
WBC # BLD AUTO: 7.23 K/UL
WBC # BLD AUTO: 7.3 K/UL
WBC # BLD AUTO: 7.3 K/UL
WBC # BLD AUTO: 7.34 K/UL
WBC # BLD AUTO: 7.36 K/UL
WBC # BLD AUTO: 7.39 K/UL
WBC # BLD AUTO: 7.4 K/UL
WBC # BLD AUTO: 7.41 K/UL
WBC # BLD AUTO: 7.42 K/UL
WBC # BLD AUTO: 7.42 K/UL
WBC # BLD AUTO: 7.47 K/UL
WBC # BLD AUTO: 7.5 K/UL
WBC # BLD AUTO: 7.99 K/UL
WBC # BLD AUTO: 8.02 K/UL
WBC # BLD AUTO: 8.02 K/UL
WBC # BLD AUTO: 8.08 K/UL
WBC # BLD AUTO: 8.13 K/UL
WBC # BLD AUTO: 8.19 K/UL
WBC # BLD AUTO: 8.35 K/UL
WBC # BLD AUTO: 8.39 K/UL
WBC # BLD AUTO: 8.42 K/UL
WBC # BLD AUTO: 8.57 K/UL
WBC # BLD AUTO: 8.64 K/UL
WBC # BLD AUTO: 8.86 K/UL
WBC # BLD AUTO: 9.06 K/UL
WBC # BLD AUTO: 9.09 K/UL
WBC # BLD AUTO: 9.15 K/UL
WBC # BLD AUTO: 9.33 K/UL
WBC # BLD AUTO: 9.4 K/UL
WBC # BLD AUTO: 9.97 K/UL
WBC #/AREA URNS AUTO: 3 /HPF (ref 0–5)
WBC TOXIC VACUOLES BLD QL SMEAR: PRESENT
WBC: 3.9
WBC: 5.5

## 2018-01-01 PROCEDURE — 85730 THROMBOPLASTIN TIME PARTIAL: CPT

## 2018-01-01 PROCEDURE — 25000003 PHARM REV CODE 250: Mod: NTX | Performed by: STUDENT IN AN ORGANIZED HEALTH CARE EDUCATION/TRAINING PROGRAM

## 2018-01-01 PROCEDURE — D9220A PRA ANESTHESIA: Mod: CRNA,NTX,, | Performed by: NURSE ANESTHETIST, CERTIFIED REGISTERED

## 2018-01-01 PROCEDURE — 99232 SBSQ HOSP IP/OBS MODERATE 35: CPT | Mod: ,,, | Performed by: INTERNAL MEDICINE

## 2018-01-01 PROCEDURE — 83880 ASSAY OF NATRIURETIC PEPTIDE: CPT | Mod: NTX

## 2018-01-01 PROCEDURE — 85025 COMPLETE CBC W/AUTO DIFF WBC: CPT

## 2018-01-01 PROCEDURE — 37000009 HC ANESTHESIA EA ADD 15 MINS: Mod: NTX | Performed by: INTERNAL MEDICINE

## 2018-01-01 PROCEDURE — 25000003 PHARM REV CODE 250: Performed by: ANESTHESIOLOGY

## 2018-01-01 PROCEDURE — P9016 RBC LEUKOCYTES REDUCED: HCPCS | Mod: NTX

## 2018-01-01 PROCEDURE — G8978 MOBILITY CURRENT STATUS: HCPCS | Mod: CK,NTX

## 2018-01-01 PROCEDURE — 88307 TISSUE EXAM BY PATHOLOGIST: CPT | Mod: 26,,,

## 2018-01-01 PROCEDURE — 84134 ASSAY OF PREALBUMIN: CPT | Mod: NTX

## 2018-01-01 PROCEDURE — 93010 ELECTROCARDIOGRAM REPORT: CPT | Mod: NTX,,, | Performed by: INTERNAL MEDICINE

## 2018-01-01 PROCEDURE — 85025 COMPLETE CBC W/AUTO DIFF WBC: CPT | Mod: 91,NTX

## 2018-01-01 PROCEDURE — 36415 COLL VENOUS BLD VENIPUNCTURE: CPT | Mod: NTX

## 2018-01-01 PROCEDURE — 86140 C-REACTIVE PROTEIN: CPT | Mod: NTX

## 2018-01-01 PROCEDURE — 20000000 HC ICU ROOM: Mod: NTX

## 2018-01-01 PROCEDURE — 80048 BASIC METABOLIC PNL TOTAL CA: CPT

## 2018-01-01 PROCEDURE — 94761 N-INVAS EAR/PLS OXIMETRY MLT: CPT | Mod: NTX

## 2018-01-01 PROCEDURE — 99285 EMERGENCY DEPT VISIT HI MDM: CPT | Mod: ,,, | Performed by: EMERGENCY MEDICINE

## 2018-01-01 PROCEDURE — 85025 COMPLETE CBC W/AUTO DIFF WBC: CPT | Mod: NTX

## 2018-01-01 PROCEDURE — 25000003 PHARM REV CODE 250

## 2018-01-01 PROCEDURE — 82248 BILIRUBIN DIRECT: CPT

## 2018-01-01 PROCEDURE — 94761 N-INVAS EAR/PLS OXIMETRY MLT: CPT

## 2018-01-01 PROCEDURE — 85014 HEMATOCRIT: CPT

## 2018-01-01 PROCEDURE — 85730 THROMBOPLASTIN TIME PARTIAL: CPT | Mod: NTX

## 2018-01-01 PROCEDURE — 63600175 PHARM REV CODE 636 W HCPCS: Mod: NTX | Performed by: PHYSICIAN ASSISTANT

## 2018-01-01 PROCEDURE — 27000248 HC VAD-ADDITIONAL DAY: Mod: NTX

## 2018-01-01 PROCEDURE — 25000003 PHARM REV CODE 250: Mod: NTX | Performed by: INTERNAL MEDICINE

## 2018-01-01 PROCEDURE — 93750 INTERROGATION VAD IN PERSON: CPT | Mod: NTX,,, | Performed by: INTERNAL MEDICINE

## 2018-01-01 PROCEDURE — 83735 ASSAY OF MAGNESIUM: CPT

## 2018-01-01 PROCEDURE — 25000003 PHARM REV CODE 250: Performed by: INTERNAL MEDICINE

## 2018-01-01 PROCEDURE — 63600175 PHARM REV CODE 636 W HCPCS: Mod: NTX | Performed by: STUDENT IN AN ORGANIZED HEALTH CARE EDUCATION/TRAINING PROGRAM

## 2018-01-01 PROCEDURE — 93451 RIGHT HEART CATH: CPT | Mod: 26,,, | Performed by: INTERNAL MEDICINE

## 2018-01-01 PROCEDURE — 27000248 HC VAD-ADDITIONAL DAY

## 2018-01-01 PROCEDURE — 63600175 PHARM REV CODE 636 W HCPCS: Performed by: PHYSICIAN ASSISTANT

## 2018-01-01 PROCEDURE — 85610 PROTHROMBIN TIME: CPT | Mod: PO,TXP

## 2018-01-01 PROCEDURE — 83615 LACTATE (LD) (LDH) ENZYME: CPT | Mod: NTX

## 2018-01-01 PROCEDURE — 83735 ASSAY OF MAGNESIUM: CPT | Mod: NTX

## 2018-01-01 PROCEDURE — 37000008 HC ANESTHESIA 1ST 15 MINUTES: Performed by: INTERNAL MEDICINE

## 2018-01-01 PROCEDURE — A4216 STERILE WATER/SALINE, 10 ML: HCPCS | Mod: NTX | Performed by: STUDENT IN AN ORGANIZED HEALTH CARE EDUCATION/TRAINING PROGRAM

## 2018-01-01 PROCEDURE — 83735 ASSAY OF MAGNESIUM: CPT | Mod: 91

## 2018-01-01 PROCEDURE — 25000003 PHARM REV CODE 250: Performed by: PHYSICIAN ASSISTANT

## 2018-01-01 PROCEDURE — 63600175 PHARM REV CODE 636 W HCPCS: Mod: JG,NTX | Performed by: PHYSICIAN ASSISTANT

## 2018-01-01 PROCEDURE — 80076 HEPATIC FUNCTION PANEL: CPT | Mod: NTX

## 2018-01-01 PROCEDURE — C9113 INJ PANTOPRAZOLE SODIUM, VIA: HCPCS | Performed by: STUDENT IN AN ORGANIZED HEALTH CARE EDUCATION/TRAINING PROGRAM

## 2018-01-01 PROCEDURE — 63600175 PHARM REV CODE 636 W HCPCS: Mod: NTX | Performed by: NURSE PRACTITIONER

## 2018-01-01 PROCEDURE — 99232 SBSQ HOSP IP/OBS MODERATE 35: CPT | Mod: NTX,,, | Performed by: INTERNAL MEDICINE

## 2018-01-01 PROCEDURE — 36620 INSERTION CATHETER ARTERY: CPT | Mod: NTX

## 2018-01-01 PROCEDURE — 97803 MED NUTRITION INDIV SUBSEQ: CPT | Performed by: DIETITIAN, REGISTERED

## 2018-01-01 PROCEDURE — 80048 BASIC METABOLIC PNL TOTAL CA: CPT | Mod: PO,NTX

## 2018-01-01 PROCEDURE — 80048 BASIC METABOLIC PNL TOTAL CA: CPT | Mod: NTX

## 2018-01-01 PROCEDURE — 86901 BLOOD TYPING SEROLOGIC RH(D): CPT | Mod: NTX

## 2018-01-01 PROCEDURE — A4216 STERILE WATER/SALINE, 10 ML: HCPCS | Performed by: INTERNAL MEDICINE

## 2018-01-01 PROCEDURE — C9113 INJ PANTOPRAZOLE SODIUM, VIA: HCPCS | Mod: NTX | Performed by: PHYSICIAN ASSISTANT

## 2018-01-01 PROCEDURE — 90471 IMMUNIZATION ADMIN: CPT | Mod: PBBFAC,TXP

## 2018-01-01 PROCEDURE — 93750 INTERROGATION VAD IN PERSON: CPT | Mod: ,,, | Performed by: INTERNAL MEDICINE

## 2018-01-01 PROCEDURE — 20600001 HC STEP DOWN PRIVATE ROOM: Mod: NTX

## 2018-01-01 PROCEDURE — 84100 ASSAY OF PHOSPHORUS: CPT | Mod: NTX

## 2018-01-01 PROCEDURE — 85610 PROTHROMBIN TIME: CPT

## 2018-01-01 PROCEDURE — 25000003 PHARM REV CODE 250: Performed by: STUDENT IN AN ORGANIZED HEALTH CARE EDUCATION/TRAINING PROGRAM

## 2018-01-01 PROCEDURE — 20600001 HC STEP DOWN PRIVATE ROOM

## 2018-01-01 PROCEDURE — 83605 ASSAY OF LACTIC ACID: CPT | Mod: 91,NTX

## 2018-01-01 PROCEDURE — 85610 PROTHROMBIN TIME: CPT | Mod: PO,NTX

## 2018-01-01 PROCEDURE — 80053 COMPREHEN METABOLIC PANEL: CPT

## 2018-01-01 PROCEDURE — 63600175 PHARM REV CODE 636 W HCPCS: Performed by: INTERNAL MEDICINE

## 2018-01-01 PROCEDURE — 80048 BASIC METABOLIC PNL TOTAL CA: CPT | Mod: 91,NTX

## 2018-01-01 PROCEDURE — C1894 INTRO/SHEATH, NON-LASER: HCPCS

## 2018-01-01 PROCEDURE — 86140 C-REACTIVE PROTEIN: CPT

## 2018-01-01 PROCEDURE — 85520 HEPARIN ASSAY: CPT

## 2018-01-01 PROCEDURE — 99232 SBSQ HOSP IP/OBS MODERATE 35: CPT | Mod: 25,,, | Performed by: INTERNAL MEDICINE

## 2018-01-01 PROCEDURE — 99233 SBSQ HOSP IP/OBS HIGH 50: CPT | Mod: NTX,,, | Performed by: INTERNAL MEDICINE

## 2018-01-01 PROCEDURE — 82728 ASSAY OF FERRITIN: CPT

## 2018-01-01 PROCEDURE — 36415 COLL VENOUS BLD VENIPUNCTURE: CPT | Mod: TXP

## 2018-01-01 PROCEDURE — 85610 PROTHROMBIN TIME: CPT | Mod: 91,NTX

## 2018-01-01 PROCEDURE — 25000003 PHARM REV CODE 250: Mod: NTX | Performed by: NURSE ANESTHETIST, CERTIFIED REGISTERED

## 2018-01-01 PROCEDURE — 37000009 HC ANESTHESIA EA ADD 15 MINS: Performed by: INTERNAL MEDICINE

## 2018-01-01 PROCEDURE — 83615 LACTATE (LD) (LDH) ENZYME: CPT

## 2018-01-01 PROCEDURE — 25000003 PHARM REV CODE 250: Mod: NTX | Performed by: PHYSICIAN ASSISTANT

## 2018-01-01 PROCEDURE — 36415 COLL VENOUS BLD VENIPUNCTURE: CPT

## 2018-01-01 PROCEDURE — 94618 PULMONARY STRESS TESTING: CPT | Mod: 26,S$PBB,TXP, | Performed by: INTERNAL MEDICINE

## 2018-01-01 PROCEDURE — D9220A PRA ANESTHESIA: Mod: CRNA,,, | Performed by: NURSE ANESTHETIST, CERTIFIED REGISTERED

## 2018-01-01 PROCEDURE — 99232 SBSQ HOSP IP/OBS MODERATE 35: CPT | Mod: NTX,,, | Performed by: PHYSICIAN ASSISTANT

## 2018-01-01 PROCEDURE — 85025 COMPLETE CBC W/AUTO DIFF WBC: CPT | Mod: 91

## 2018-01-01 PROCEDURE — 87040 BLOOD CULTURE FOR BACTERIA: CPT | Mod: NTX

## 2018-01-01 PROCEDURE — 99213 OFFICE O/P EST LOW 20 MIN: CPT | Mod: PBBFAC,NTX,25 | Performed by: INTERNAL MEDICINE

## 2018-01-01 PROCEDURE — 80202 ASSAY OF VANCOMYCIN: CPT | Mod: NTX

## 2018-01-01 PROCEDURE — 93750 INTERROGATION VAD IN PERSON: CPT | Mod: ,,, | Performed by: THORACIC SURGERY (CARDIOTHORACIC VASCULAR SURGERY)

## 2018-01-01 PROCEDURE — 96375 TX/PRO/DX INJ NEW DRUG ADDON: CPT | Mod: NTX

## 2018-01-01 PROCEDURE — 63600175 PHARM REV CODE 636 W HCPCS: Mod: NTX | Performed by: INTERNAL MEDICINE

## 2018-01-01 PROCEDURE — 37000008 HC ANESTHESIA 1ST 15 MINUTES: Performed by: SURGERY

## 2018-01-01 PROCEDURE — 25000003 PHARM REV CODE 250: Performed by: NURSE PRACTITIONER

## 2018-01-01 PROCEDURE — 93283 PRGRMG EVAL IMPLANTABLE DFB: CPT | Mod: NTX

## 2018-01-01 PROCEDURE — 80076 HEPATIC FUNCTION PANEL: CPT

## 2018-01-01 PROCEDURE — 99238 HOSP IP/OBS DSCHRG MGMT 30/<: CPT | Mod: NTX,,, | Performed by: INTERNAL MEDICINE

## 2018-01-01 PROCEDURE — 99233 SBSQ HOSP IP/OBS HIGH 50: CPT | Mod: GC,NTX,, | Performed by: INTERNAL MEDICINE

## 2018-01-01 PROCEDURE — 85610 PROTHROMBIN TIME: CPT | Mod: NTX

## 2018-01-01 PROCEDURE — 83880 ASSAY OF NATRIURETIC PEPTIDE: CPT

## 2018-01-01 PROCEDURE — 99233 SBSQ HOSP IP/OBS HIGH 50: CPT | Mod: ,,, | Performed by: INTERNAL MEDICINE

## 2018-01-01 PROCEDURE — 93750 INTERROGATION VAD IN PERSON: CPT | Performed by: INTERNAL MEDICINE

## 2018-01-01 PROCEDURE — 86920 COMPATIBILITY TEST SPIN: CPT | Mod: NTX

## 2018-01-01 PROCEDURE — 93010 ELECTROCARDIOGRAM REPORT: CPT | Mod: S$PBB,,, | Performed by: INTERNAL MEDICINE

## 2018-01-01 PROCEDURE — 84134 ASSAY OF PREALBUMIN: CPT

## 2018-01-01 PROCEDURE — 84100 ASSAY OF PHOSPHORUS: CPT

## 2018-01-01 PROCEDURE — 99213 OFFICE O/P EST LOW 20 MIN: CPT | Mod: PBBFAC,27,TXP | Performed by: THORACIC SURGERY (CARDIOTHORACIC VASCULAR SURGERY)

## 2018-01-01 PROCEDURE — 99223 1ST HOSP IP/OBS HIGH 75: CPT | Mod: GC,NTX,, | Performed by: INTERNAL MEDICINE

## 2018-01-01 PROCEDURE — 99233 SBSQ HOSP IP/OBS HIGH 50: CPT | Mod: GC,,, | Performed by: SURGERY

## 2018-01-01 PROCEDURE — 80053 COMPREHEN METABOLIC PANEL: CPT | Mod: NTX

## 2018-01-01 PROCEDURE — 36000708 HC OR TIME LEV III 1ST 15 MIN: Performed by: SURGERY

## 2018-01-01 PROCEDURE — 85730 THROMBOPLASTIN TIME PARTIAL: CPT | Mod: 91

## 2018-01-01 PROCEDURE — 99291 CRITICAL CARE FIRST HOUR: CPT | Mod: 25,NTX,, | Performed by: INTERNAL MEDICINE

## 2018-01-01 PROCEDURE — 84484 ASSAY OF TROPONIN QUANT: CPT

## 2018-01-01 PROCEDURE — 99212 OFFICE O/P EST SF 10 MIN: CPT | Mod: PBBFAC,27,TXP

## 2018-01-01 PROCEDURE — 99292 CRITICAL CARE ADDL 30 MIN: CPT | Mod: NTX,,, | Performed by: INTERNAL MEDICINE

## 2018-01-01 PROCEDURE — 80053 COMPREHEN METABOLIC PANEL: CPT | Mod: PO,NTX

## 2018-01-01 PROCEDURE — 88305 TISSUE EXAM BY PATHOLOGIST: CPT | Performed by: PATHOLOGY

## 2018-01-01 PROCEDURE — 84132 ASSAY OF SERUM POTASSIUM: CPT

## 2018-01-01 PROCEDURE — 63600175 PHARM REV CODE 636 W HCPCS

## 2018-01-01 PROCEDURE — 0DJ08ZZ INSPECTION OF UPPER INTESTINAL TRACT, VIA NATURAL OR ARTIFICIAL OPENING ENDOSCOPIC: ICD-10-PCS | Performed by: INTERNAL MEDICINE

## 2018-01-01 PROCEDURE — C1751 CATH, INF, PER/CENT/MIDLINE: HCPCS

## 2018-01-01 PROCEDURE — 36430 TRANSFUSION BLD/BLD COMPNT: CPT

## 2018-01-01 PROCEDURE — 43235 EGD DIAGNOSTIC BRUSH WASH: CPT | Mod: NTX,,, | Performed by: INTERNAL MEDICINE

## 2018-01-01 PROCEDURE — P9016 RBC LEUKOCYTES REDUCED: HCPCS

## 2018-01-01 PROCEDURE — 99214 OFFICE O/P EST MOD 30 MIN: CPT | Mod: S$PBB,TXP,, | Performed by: INTERNAL MEDICINE

## 2018-01-01 PROCEDURE — 99213 OFFICE O/P EST LOW 20 MIN: CPT | Mod: PBBFAC,27,25 | Performed by: SURGERY

## 2018-01-01 PROCEDURE — 94729 DIFFUSING CAPACITY: CPT | Mod: 26,S$PBB,, | Performed by: INTERNAL MEDICINE

## 2018-01-01 PROCEDURE — 93306 TTE W/DOPPLER COMPLETE: CPT | Mod: NTX

## 2018-01-01 PROCEDURE — 36415 COLL VENOUS BLD VENIPUNCTURE: CPT | Mod: PO,TXP

## 2018-01-01 PROCEDURE — 44799 UNLISTED PX SMALL INTESTINE: CPT | Performed by: INTERNAL MEDICINE

## 2018-01-01 PROCEDURE — 87077 CULTURE AEROBIC IDENTIFY: CPT | Mod: NTX

## 2018-01-01 PROCEDURE — 94799 UNLISTED PULMONARY SVC/PX: CPT

## 2018-01-01 PROCEDURE — 83615 LACTATE (LD) (LDH) ENZYME: CPT | Mod: PO,NTX

## 2018-01-01 PROCEDURE — 86920 COMPATIBILITY TEST SPIN: CPT

## 2018-01-01 PROCEDURE — 36415 COLL VENOUS BLD VENIPUNCTURE: CPT | Mod: PO,NTX

## 2018-01-01 PROCEDURE — 82330 ASSAY OF CALCIUM: CPT

## 2018-01-01 PROCEDURE — 1159F MED LIST DOCD IN RCRD: CPT | Mod: ,,, | Performed by: INTERNAL MEDICINE

## 2018-01-01 PROCEDURE — 80053 COMPREHEN METABOLIC PANEL: CPT | Mod: TXP

## 2018-01-01 PROCEDURE — 82272 OCCULT BLD FECES 1-3 TESTS: CPT | Mod: NTX

## 2018-01-01 PROCEDURE — 87186 SC STD MICRODIL/AGAR DIL: CPT | Mod: NTX

## 2018-01-01 PROCEDURE — 02HV33Z INSERTION OF INFUSION DEVICE INTO SUPERIOR VENA CAVA, PERCUTANEOUS APPROACH: ICD-10-PCS | Performed by: INTERNAL MEDICINE

## 2018-01-01 PROCEDURE — 99213 OFFICE O/P EST LOW 20 MIN: CPT | Mod: PBBFAC,TXP,25 | Performed by: INTERNAL MEDICINE

## 2018-01-01 PROCEDURE — 99291 CRITICAL CARE FIRST HOUR: CPT | Mod: 25,NTX

## 2018-01-01 PROCEDURE — 99291 CRITICAL CARE FIRST HOUR: CPT | Mod: NTX,,, | Performed by: NURSE PRACTITIONER

## 2018-01-01 PROCEDURE — 99999 PR PBB SHADOW E&M-EST. PATIENT-LVL III: CPT | Mod: PBBFAC,TXP,, | Performed by: INTERNAL MEDICINE

## 2018-01-01 PROCEDURE — 99285 EMERGENCY DEPT VISIT HI MDM: CPT | Mod: 25

## 2018-01-01 PROCEDURE — 93283 PRGRMG EVAL IMPLANTABLE DFB: CPT | Mod: PBBFAC,PO | Performed by: INTERNAL MEDICINE

## 2018-01-01 PROCEDURE — 93750 INTERROGATION VAD IN PERSON: CPT | Mod: PBBFAC | Performed by: INTERNAL MEDICINE

## 2018-01-01 PROCEDURE — 63600175 PHARM REV CODE 636 W HCPCS: Performed by: STUDENT IN AN ORGANIZED HEALTH CARE EDUCATION/TRAINING PROGRAM

## 2018-01-01 PROCEDURE — 99233 SBSQ HOSP IP/OBS HIGH 50: CPT | Mod: NTX,,, | Performed by: PHYSICIAN ASSISTANT

## 2018-01-01 PROCEDURE — 99233 SBSQ HOSP IP/OBS HIGH 50: CPT | Mod: 25,NTX,, | Performed by: INTERNAL MEDICINE

## 2018-01-01 PROCEDURE — 27000221 HC OXYGEN, UP TO 24 HOURS: Mod: NTX

## 2018-01-01 PROCEDURE — 99213 OFFICE O/P EST LOW 20 MIN: CPT | Mod: PBBFAC | Performed by: INTERNAL MEDICINE

## 2018-01-01 PROCEDURE — 84443 ASSAY THYROID STIM HORMONE: CPT | Mod: NTX

## 2018-01-01 PROCEDURE — 86644 CMV ANTIBODY: CPT

## 2018-01-01 PROCEDURE — 44799 UNLISTED PX SMALL INTESTINE: CPT | Mod: ,,, | Performed by: INTERNAL MEDICINE

## 2018-01-01 PROCEDURE — 83615 LACTATE (LD) (LDH) ENZYME: CPT | Mod: TXP

## 2018-01-01 PROCEDURE — 83540 ASSAY OF IRON: CPT | Mod: NTX

## 2018-01-01 PROCEDURE — 96374 THER/PROPH/DIAG INJ IV PUSH: CPT | Mod: NTX

## 2018-01-01 PROCEDURE — 83051 HEMOGLOBIN PLASMA: CPT

## 2018-01-01 PROCEDURE — 87040 BLOOD CULTURE FOR BACTERIA: CPT

## 2018-01-01 PROCEDURE — 86850 RBC ANTIBODY SCREEN: CPT

## 2018-01-01 PROCEDURE — 85610 PROTHROMBIN TIME: CPT | Mod: TXP

## 2018-01-01 PROCEDURE — 85027 COMPLETE CBC AUTOMATED: CPT | Mod: NTX

## 2018-01-01 PROCEDURE — 99285 EMERGENCY DEPT VISIT HI MDM: CPT | Mod: 25,NTX

## 2018-01-01 PROCEDURE — 84100 ASSAY OF PHOSPHORUS: CPT | Mod: TXP

## 2018-01-01 PROCEDURE — 99233 SBSQ HOSP IP/OBS HIGH 50: CPT | Mod: 24,,, | Performed by: THORACIC SURGERY (CARDIOTHORACIC VASCULAR SURGERY)

## 2018-01-01 PROCEDURE — 63600175 PHARM REV CODE 636 W HCPCS: Mod: NTX | Performed by: NURSE ANESTHETIST, CERTIFIED REGISTERED

## 2018-01-01 PROCEDURE — 99223 1ST HOSP IP/OBS HIGH 75: CPT | Mod: GC,,, | Performed by: INTERNAL MEDICINE

## 2018-01-01 PROCEDURE — 99232 SBSQ HOSP IP/OBS MODERATE 35: CPT | Mod: ,,, | Performed by: NURSE PRACTITIONER

## 2018-01-01 PROCEDURE — C1751 CATH, INF, PER/CENT/MIDLINE: HCPCS | Mod: NTX

## 2018-01-01 PROCEDURE — 44120 REMOVAL OF SMALL INTESTINE: CPT | Mod: GC,,, | Performed by: SURGERY

## 2018-01-01 PROCEDURE — 99215 OFFICE O/P EST HI 40 MIN: CPT | Mod: S$PBB,,, | Performed by: INTERNAL MEDICINE

## 2018-01-01 PROCEDURE — 45378 DIAGNOSTIC COLONOSCOPY: CPT | Performed by: INTERNAL MEDICINE

## 2018-01-01 PROCEDURE — 20000000 HC ICU ROOM

## 2018-01-01 PROCEDURE — 71250 CT THORAX DX C-: CPT | Mod: TC,TXP

## 2018-01-01 PROCEDURE — 93312 ECHO TRANSESOPHAGEAL: CPT | Mod: 26,,, | Performed by: INTERNAL MEDICINE

## 2018-01-01 PROCEDURE — 99232 SBSQ HOSP IP/OBS MODERATE 35: CPT | Mod: GC,NTX,, | Performed by: INTERNAL MEDICINE

## 2018-01-01 PROCEDURE — 4A023N6 MEASUREMENT OF CARDIAC SAMPLING AND PRESSURE, RIGHT HEART, PERCUTANEOUS APPROACH: ICD-10-PCS | Performed by: INTERNAL MEDICINE

## 2018-01-01 PROCEDURE — 36569 INSJ PICC 5 YR+ W/O IMAGING: CPT

## 2018-01-01 PROCEDURE — 82728 ASSAY OF FERRITIN: CPT | Mod: NTX

## 2018-01-01 PROCEDURE — 1126F AMNT PAIN NOTED NONE PRSNT: CPT | Mod: ,,, | Performed by: PHYSICIAN ASSISTANT

## 2018-01-01 PROCEDURE — 99213 OFFICE O/P EST LOW 20 MIN: CPT | Mod: PBBFAC | Performed by: PHYSICIAN ASSISTANT

## 2018-01-01 PROCEDURE — 37000009 HC ANESTHESIA EA ADD 15 MINS: Performed by: SURGERY

## 2018-01-01 PROCEDURE — 99214 OFFICE O/P EST MOD 30 MIN: CPT | Mod: S$PBB,,, | Performed by: INTERNAL MEDICINE

## 2018-01-01 PROCEDURE — 80202 ASSAY OF VANCOMYCIN: CPT

## 2018-01-01 PROCEDURE — 90471 IMMUNIZATION ADMIN: CPT | Mod: NTX | Performed by: INTERNAL MEDICINE

## 2018-01-01 PROCEDURE — 84484 ASSAY OF TROPONIN QUANT: CPT | Mod: NTX

## 2018-01-01 PROCEDURE — 99238 HOSP IP/OBS DSCHRG MGMT 30/<: CPT | Mod: ,,, | Performed by: INTERNAL MEDICINE

## 2018-01-01 PROCEDURE — 71046 X-RAY EXAM CHEST 2 VIEWS: CPT | Mod: TC,FY,TXP

## 2018-01-01 PROCEDURE — 82550 ASSAY OF CK (CPK): CPT | Mod: NTX

## 2018-01-01 PROCEDURE — D9220A PRA ANESTHESIA: Mod: ANES,,, | Performed by: ANESTHESIOLOGY

## 2018-01-01 PROCEDURE — 37000009 HC ANESTHESIA EA ADD 15 MINS

## 2018-01-01 PROCEDURE — 99999 PR PBB SHADOW E&M-EST. PATIENT-LVL III: CPT | Mod: PBBFAC,,, | Performed by: PHYSICIAN ASSISTANT

## 2018-01-01 PROCEDURE — 63600175 PHARM REV CODE 636 W HCPCS: Performed by: EMERGENCY MEDICINE

## 2018-01-01 PROCEDURE — 87040 BLOOD CULTURE FOR BACTERIA: CPT | Mod: 59

## 2018-01-01 PROCEDURE — 82728 ASSAY OF FERRITIN: CPT | Mod: TXP

## 2018-01-01 PROCEDURE — 86850 RBC ANTIBODY SCREEN: CPT | Mod: 91

## 2018-01-01 PROCEDURE — 99213 OFFICE O/P EST LOW 20 MIN: CPT | Mod: PBBFAC,27 | Performed by: INTERNAL MEDICINE

## 2018-01-01 PROCEDURE — 99233 SBSQ HOSP IP/OBS HIGH 50: CPT | Mod: ,,, | Performed by: PHYSICIAN ASSISTANT

## 2018-01-01 PROCEDURE — 99900037 HC PT THERAPY SCREENING (STAT): Mod: NTX

## 2018-01-01 PROCEDURE — 99999 PR PBB SHADOW E&M-EST. PATIENT-LVL III: CPT | Mod: PBBFAC,,, | Performed by: INTERNAL MEDICINE

## 2018-01-01 PROCEDURE — 84145 PROCALCITONIN (PCT): CPT | Mod: NTX

## 2018-01-01 PROCEDURE — 3E0234Z INTRODUCTION OF SERUM, TOXOID AND VACCINE INTO MUSCLE, PERCUTANEOUS APPROACH: ICD-10-PCS | Performed by: INTERNAL MEDICINE

## 2018-01-01 PROCEDURE — 85025 COMPLETE CBC W/AUTO DIFF WBC: CPT | Mod: PO,NTX

## 2018-01-01 PROCEDURE — G0378 HOSPITAL OBSERVATION PER HR: HCPCS | Mod: NTX

## 2018-01-01 PROCEDURE — 86644 CMV ANTIBODY: CPT | Mod: NTX

## 2018-01-01 PROCEDURE — 63600175 PHARM REV CODE 636 W HCPCS: Performed by: NURSE PRACTITIONER

## 2018-01-01 PROCEDURE — 94729 DIFFUSING CAPACITY: CPT | Mod: PBBFAC | Performed by: INTERNAL MEDICINE

## 2018-01-01 PROCEDURE — 36430 TRANSFUSION BLD/BLD COMPNT: CPT | Mod: NTX

## 2018-01-01 PROCEDURE — 86850 RBC ANTIBODY SCREEN: CPT | Mod: NTX

## 2018-01-01 PROCEDURE — 63600175 PHARM REV CODE 636 W HCPCS: Mod: NTX

## 2018-01-01 PROCEDURE — 1126F AMNT PAIN NOTED NONE PRSNT: CPT | Mod: ,,, | Performed by: INTERNAL MEDICINE

## 2018-01-01 PROCEDURE — 43251 EGD REMOVE LESION SNARE: CPT | Performed by: INTERNAL MEDICINE

## 2018-01-01 PROCEDURE — 93750 INTERROGATION VAD IN PERSON: CPT | Performed by: NURSE PRACTITIONER

## 2018-01-01 PROCEDURE — 97161 PT EVAL LOW COMPLEX 20 MIN: CPT | Mod: NTX

## 2018-01-01 PROCEDURE — 99213 OFFICE O/P EST LOW 20 MIN: CPT | Mod: PBBFAC,25,27,NTX | Performed by: INTERNAL MEDICINE

## 2018-01-01 PROCEDURE — 37799 UNLISTED PX VASCULAR SURGERY: CPT | Mod: NTX

## 2018-01-01 PROCEDURE — 97116 GAIT TRAINING THERAPY: CPT | Mod: NTX

## 2018-01-01 PROCEDURE — 99217 PR OBSERVATION CARE DISCHARGE: CPT | Mod: NTX,,, | Performed by: INTERNAL MEDICINE

## 2018-01-01 PROCEDURE — 99214 OFFICE O/P EST MOD 30 MIN: CPT | Mod: S$PBB,NTX,, | Performed by: INTERNAL MEDICINE

## 2018-01-01 PROCEDURE — 88307 TISSUE EXAM BY PATHOLOGIST: CPT

## 2018-01-01 PROCEDURE — 99213 OFFICE O/P EST LOW 20 MIN: CPT | Mod: PBBFAC,25,TXP | Performed by: INTERNAL MEDICINE

## 2018-01-01 PROCEDURE — 82272 OCCULT BLD FECES 1-3 TESTS: CPT

## 2018-01-01 PROCEDURE — 86140 C-REACTIVE PROTEIN: CPT | Mod: TXP

## 2018-01-01 PROCEDURE — 99222 1ST HOSP IP/OBS MODERATE 55: CPT | Mod: ,,, | Performed by: INTERNAL MEDICINE

## 2018-01-01 PROCEDURE — 93750 INTERROGATION VAD IN PERSON: CPT | Mod: TXP,,, | Performed by: INTERNAL MEDICINE

## 2018-01-01 PROCEDURE — 45378 DIAGNOSTIC COLONOSCOPY: CPT | Mod: ,,, | Performed by: INTERNAL MEDICINE

## 2018-01-01 PROCEDURE — 99223 1ST HOSP IP/OBS HIGH 75: CPT | Mod: AI,NTX,, | Performed by: INTERNAL MEDICINE

## 2018-01-01 PROCEDURE — 99213 OFFICE O/P EST LOW 20 MIN: CPT | Mod: S$PBB,NTX,, | Performed by: INTERNAL MEDICINE

## 2018-01-01 PROCEDURE — 84295 ASSAY OF SERUM SODIUM: CPT

## 2018-01-01 PROCEDURE — 99291 CRITICAL CARE FIRST HOUR: CPT | Mod: NTX,,, | Performed by: INTERNAL MEDICINE

## 2018-01-01 PROCEDURE — 99223 1ST HOSP IP/OBS HIGH 75: CPT | Mod: NTX,,, | Performed by: INTERNAL MEDICINE

## 2018-01-01 PROCEDURE — 93306 TTE W/DOPPLER COMPLETE: CPT | Mod: 26,NTX,, | Performed by: INTERNAL MEDICINE

## 2018-01-01 PROCEDURE — 36000709 HC OR TIME LEV III EA ADD 15 MIN: Performed by: SURGERY

## 2018-01-01 PROCEDURE — 99215 OFFICE O/P EST HI 40 MIN: CPT | Mod: S$PBB,TXP,, | Performed by: THORACIC SURGERY (CARDIOTHORACIC VASCULAR SURGERY)

## 2018-01-01 PROCEDURE — 87186 SC STD MICRODIL/AGAR DIL: CPT

## 2018-01-01 PROCEDURE — 99219 PR INITIAL OBSERVATION CARE,LEVL II: CPT | Mod: NTX,,, | Performed by: INTERNAL MEDICINE

## 2018-01-01 PROCEDURE — 37000008 HC ANESTHESIA 1ST 15 MINUTES: Mod: NTX | Performed by: INTERNAL MEDICINE

## 2018-01-01 PROCEDURE — 81003 URINALYSIS AUTO W/O SCOPE: CPT | Mod: NTX

## 2018-01-01 PROCEDURE — 25000003 PHARM REV CODE 250: Mod: NTX | Performed by: NURSE PRACTITIONER

## 2018-01-01 PROCEDURE — 84134 ASSAY OF PREALBUMIN: CPT | Mod: TXP

## 2018-01-01 PROCEDURE — 83540 ASSAY OF IRON: CPT

## 2018-01-01 PROCEDURE — 82465 ASSAY BLD/SERUM CHOLESTEROL: CPT

## 2018-01-01 PROCEDURE — 37799 UNLISTED PX VASCULAR SURGERY: CPT

## 2018-01-01 PROCEDURE — 93005 ELECTROCARDIOGRAM TRACING: CPT | Mod: NTX

## 2018-01-01 PROCEDURE — 99231 SBSQ HOSP IP/OBS SF/LOW 25: CPT | Mod: GC,NTX,, | Performed by: INTERNAL MEDICINE

## 2018-01-01 PROCEDURE — 25000003 PHARM REV CODE 250: Mod: NTX | Performed by: EMERGENCY MEDICINE

## 2018-01-01 PROCEDURE — 80299 QUANTITATIVE ASSAY DRUG: CPT | Mod: 91,NTX

## 2018-01-01 PROCEDURE — 45378 DIAGNOSTIC COLONOSCOPY: CPT | Mod: GC,NTX,, | Performed by: INTERNAL MEDICINE

## 2018-01-01 PROCEDURE — 82803 BLOOD GASES ANY COMBINATION: CPT | Mod: NTX

## 2018-01-01 PROCEDURE — 63600175 PHARM REV CODE 636 W HCPCS: Performed by: NURSE ANESTHETIST, CERTIFIED REGISTERED

## 2018-01-01 PROCEDURE — 4B02XTZ MEASUREMENT OF CARDIAC DEFIBRILLATOR, EXTERNAL APPROACH: ICD-10-PCS | Performed by: INTERNAL MEDICINE

## 2018-01-01 PROCEDURE — 0DJD8ZZ INSPECTION OF LOWER INTESTINAL TRACT, VIA NATURAL OR ARTIFICIAL OPENING ENDOSCOPIC: ICD-10-PCS | Performed by: INTERNAL MEDICINE

## 2018-01-01 PROCEDURE — 27000221 HC OXYGEN, UP TO 24 HOURS

## 2018-01-01 PROCEDURE — 93750 INTERROGATION VAD IN PERSON: CPT | Mod: S$PBB,,, | Performed by: INTERNAL MEDICINE

## 2018-01-01 PROCEDURE — 85610 PROTHROMBIN TIME: CPT | Mod: 91

## 2018-01-01 PROCEDURE — 93325 DOPPLER ECHO COLOR FLOW MAPG: CPT | Mod: 26,,, | Performed by: INTERNAL MEDICINE

## 2018-01-01 PROCEDURE — 74176 CT ABD & PELVIS W/O CONTRAST: CPT | Mod: 26,TXP,, | Performed by: RADIOLOGY

## 2018-01-01 PROCEDURE — 94618 PULMONARY STRESS TESTING: CPT | Mod: PBBFAC,TXP | Performed by: INTERNAL MEDICINE

## 2018-01-01 PROCEDURE — 83605 ASSAY OF LACTIC ACID: CPT | Mod: NTX

## 2018-01-01 PROCEDURE — 99233 SBSQ HOSP IP/OBS HIGH 50: CPT | Mod: 24,ICN,, | Performed by: THORACIC SURGERY (CARDIOTHORACIC VASCULAR SURGERY)

## 2018-01-01 PROCEDURE — 25000003 PHARM REV CODE 250: Mod: NTX

## 2018-01-01 PROCEDURE — 97802 MEDICAL NUTRITION INDIV IN: CPT | Performed by: DIETITIAN, REGISTERED

## 2018-01-01 PROCEDURE — 76937 US GUIDE VASCULAR ACCESS: CPT

## 2018-01-01 PROCEDURE — 99900035 HC TECH TIME PER 15 MIN (STAT): Mod: NTX

## 2018-01-01 PROCEDURE — 5A12012 PERFORMANCE OF CARDIAC OUTPUT, SINGLE, MANUAL: ICD-10-PCS | Performed by: INTERNAL MEDICINE

## 2018-01-01 PROCEDURE — 25000003 PHARM REV CODE 250: Performed by: NURSE ANESTHETIST, CERTIFIED REGISTERED

## 2018-01-01 PROCEDURE — 87186 SC STD MICRODIL/AGAR DIL: CPT | Mod: 59

## 2018-01-01 PROCEDURE — 99213 OFFICE O/P EST LOW 20 MIN: CPT | Mod: PBBFAC,PO,25 | Performed by: INTERNAL MEDICINE

## 2018-01-01 PROCEDURE — 93283 PRGRMG EVAL IMPLANTABLE DFB: CPT | Mod: 26,NTX,, | Performed by: INTERNAL MEDICINE

## 2018-01-01 PROCEDURE — 27800505 HC CATH, RADIAL ARTERY KIT: Performed by: NURSE ANESTHETIST, CERTIFIED REGISTERED

## 2018-01-01 PROCEDURE — 99233 SBSQ HOSP IP/OBS HIGH 50: CPT | Mod: NTX,,, | Performed by: NURSE PRACTITIONER

## 2018-01-01 PROCEDURE — 99285 EMERGENCY DEPT VISIT HI MDM: CPT | Mod: NTX

## 2018-01-01 PROCEDURE — 1159F MED LIST DOCD IN RCRD: CPT | Mod: ,,, | Performed by: PHYSICIAN ASSISTANT

## 2018-01-01 PROCEDURE — C9113 INJ PANTOPRAZOLE SODIUM, VIA: HCPCS | Mod: NTX | Performed by: STUDENT IN AN ORGANIZED HEALTH CARE EDUCATION/TRAINING PROGRAM

## 2018-01-01 PROCEDURE — 36620 INSERTION CATHETER ARTERY: CPT | Mod: 59,,, | Performed by: ANESTHESIOLOGY

## 2018-01-01 PROCEDURE — 27201037 HC PRESSURE MONITORING SET UP

## 2018-01-01 PROCEDURE — 43251 EGD REMOVE LESION SNARE: CPT | Mod: ,,, | Performed by: INTERNAL MEDICINE

## 2018-01-01 PROCEDURE — 99284 EMERGENCY DEPT VISIT MOD MDM: CPT | Mod: 25,NTX

## 2018-01-01 PROCEDURE — 99213 OFFICE O/P EST LOW 20 MIN: CPT | Mod: ,,, | Performed by: PHYSICIAN ASSISTANT

## 2018-01-01 PROCEDURE — G0009 ADMIN PNEUMOCOCCAL VACCINE: HCPCS | Mod: PBBFAC,TXP

## 2018-01-01 PROCEDURE — C9113 INJ PANTOPRAZOLE SODIUM, VIA: HCPCS | Performed by: INTERNAL MEDICINE

## 2018-01-01 PROCEDURE — 99999 PR PBB SHADOW E&M-EST. PATIENT-LVL III: CPT | Mod: PBBFAC,,, | Performed by: SURGERY

## 2018-01-01 PROCEDURE — 83735 ASSAY OF MAGNESIUM: CPT | Mod: TXP

## 2018-01-01 PROCEDURE — 93325 DOPPLER ECHO COLOR FLOW MAPG: CPT

## 2018-01-01 PROCEDURE — 71046 X-RAY EXAM CHEST 2 VIEWS: CPT | Mod: 26,NTX,, | Performed by: RADIOLOGY

## 2018-01-01 PROCEDURE — 87077 CULTURE AEROBIC IDENTIFY: CPT | Mod: 59

## 2018-01-01 PROCEDURE — 87077 CULTURE AEROBIC IDENTIFY: CPT

## 2018-01-01 PROCEDURE — 43235 EGD DIAGNOSTIC BRUSH WASH: CPT | Mod: NTX | Performed by: INTERNAL MEDICINE

## 2018-01-01 PROCEDURE — 74176 CT ABD & PELVIS W/O CONTRAST: CPT | Mod: TC,TXP

## 2018-01-01 PROCEDURE — 83880 ASSAY OF NATRIURETIC PEPTIDE: CPT | Mod: TXP

## 2018-01-01 PROCEDURE — 86901 BLOOD TYPING SEROLOGIC RH(D): CPT | Mod: 91,NTX

## 2018-01-01 PROCEDURE — 36415 COLL VENOUS BLD VENIPUNCTURE: CPT | Mod: PO

## 2018-01-01 PROCEDURE — 76937 US GUIDE VASCULAR ACCESS: CPT | Mod: NTX

## 2018-01-01 PROCEDURE — 97802 MEDICAL NUTRITION INDIV IN: CPT

## 2018-01-01 PROCEDURE — 97535 SELF CARE MNGMENT TRAINING: CPT | Mod: NTX

## 2018-01-01 PROCEDURE — 99215 OFFICE O/P EST HI 40 MIN: CPT | Mod: 25,S$PBB,NTX, | Performed by: INTERNAL MEDICINE

## 2018-01-01 PROCEDURE — A4216 STERILE WATER/SALINE, 10 ML: HCPCS | Performed by: ANESTHESIOLOGY

## 2018-01-01 PROCEDURE — C9113 INJ PANTOPRAZOLE SODIUM, VIA: HCPCS | Performed by: EMERGENCY MEDICINE

## 2018-01-01 PROCEDURE — 97116 GAIT TRAINING THERAPY: CPT

## 2018-01-01 PROCEDURE — 85610 PROTHROMBIN TIME: CPT | Mod: PO

## 2018-01-01 PROCEDURE — 84550 ASSAY OF BLOOD/URIC ACID: CPT

## 2018-01-01 PROCEDURE — 99284 EMERGENCY DEPT VISIT MOD MDM: CPT | Mod: ,,, | Performed by: EMERGENCY MEDICINE

## 2018-01-01 PROCEDURE — 99203 OFFICE O/P NEW LOW 30 MIN: CPT | Mod: S$PBB,TXP,, | Performed by: INTERNAL MEDICINE

## 2018-01-01 PROCEDURE — D9220A PRA ANESTHESIA: Mod: ANES,NTX,, | Performed by: ANESTHESIOLOGY

## 2018-01-01 PROCEDURE — 85007 BL SMEAR W/DIFF WBC COUNT: CPT | Mod: NTX

## 2018-01-01 PROCEDURE — 99213 OFFICE O/P EST LOW 20 MIN: CPT | Mod: PBBFAC,25,PO,NTX | Performed by: INTERNAL MEDICINE

## 2018-01-01 PROCEDURE — 4A03353 MEASUREMENT OF ARTERIAL FLOW, PULMONARY, PERCUTANEOUS APPROACH: ICD-10-PCS | Performed by: INTERNAL MEDICINE

## 2018-01-01 PROCEDURE — 99215 OFFICE O/P EST HI 40 MIN: CPT | Mod: S$PBB,NTX,, | Performed by: INTERNAL MEDICINE

## 2018-01-01 PROCEDURE — 93283 PRGRMG EVAL IMPLANTABLE DFB: CPT | Mod: PBBFAC,PO,NTX | Performed by: INTERNAL MEDICINE

## 2018-01-01 PROCEDURE — 90662 IIV NO PRSV INCREASED AG IM: CPT | Mod: NTX | Performed by: INTERNAL MEDICINE

## 2018-01-01 PROCEDURE — 93320 DOPPLER ECHO COMPLETE: CPT | Mod: 26,,, | Performed by: INTERNAL MEDICINE

## 2018-01-01 PROCEDURE — 37000008 HC ANESTHESIA 1ST 15 MINUTES

## 2018-01-01 PROCEDURE — 86901 BLOOD TYPING SEROLOGIC RH(D): CPT

## 2018-01-01 PROCEDURE — 87205 SMEAR GRAM STAIN: CPT | Mod: TXP

## 2018-01-01 PROCEDURE — 85520 HEPARIN ASSAY: CPT | Mod: 91

## 2018-01-01 PROCEDURE — 63600175 PHARM REV CODE 636 W HCPCS: Mod: JG,NTX | Performed by: STUDENT IN AN ORGANIZED HEALTH CARE EDUCATION/TRAINING PROGRAM

## 2018-01-01 PROCEDURE — 99214 OFFICE O/P EST MOD 30 MIN: CPT | Mod: GC,NTX,, | Performed by: INTERNAL MEDICINE

## 2018-01-01 PROCEDURE — 27201089 HC SNARE, DISP (ANY): Performed by: INTERNAL MEDICINE

## 2018-01-01 PROCEDURE — 99024 POSTOP FOLLOW-UP VISIT: CPT | Mod: POP,,, | Performed by: PHYSICIAN ASSISTANT

## 2018-01-01 PROCEDURE — 80299 QUANTITATIVE ASSAY DRUG: CPT | Mod: NTX

## 2018-01-01 PROCEDURE — 85025 COMPLETE CBC W/AUTO DIFF WBC: CPT | Mod: PO,TXP

## 2018-01-01 PROCEDURE — 0DB78ZZ EXCISION OF STOMACH, PYLORUS, VIA NATURAL OR ARTIFICIAL OPENING ENDOSCOPIC: ICD-10-PCS | Performed by: INTERNAL MEDICINE

## 2018-01-01 PROCEDURE — 96365 THER/PROPH/DIAG IV INF INIT: CPT | Mod: NTX

## 2018-01-01 PROCEDURE — 88342 IMHCHEM/IMCYTCHM 1ST ANTB: CPT | Mod: 26,,, | Performed by: PATHOLOGY

## 2018-01-01 PROCEDURE — 99291 CRITICAL CARE FIRST HOUR: CPT | Mod: ,,, | Performed by: PHYSICIAN ASSISTANT

## 2018-01-01 PROCEDURE — 99999 PR PBB SHADOW E&M-EST. PATIENT-LVL I: CPT | Mod: PBBFAC,TXP,,

## 2018-01-01 PROCEDURE — G0379 DIRECT REFER HOSPITAL OBSERV: HCPCS | Mod: NTX

## 2018-01-01 PROCEDURE — 99291 CRITICAL CARE FIRST HOUR: CPT | Mod: NTX,,, | Performed by: PHYSICIAN ASSISTANT

## 2018-01-01 PROCEDURE — G0008 ADMIN INFLUENZA VIRUS VAC: HCPCS | Mod: NTX | Performed by: INTERNAL MEDICINE

## 2018-01-01 PROCEDURE — 80061 LIPID PANEL: CPT

## 2018-01-01 PROCEDURE — 83690 ASSAY OF LIPASE: CPT | Mod: NTX

## 2018-01-01 PROCEDURE — 99220 PR INITIAL OBSERVATION CARE,LEVL III: CPT | Mod: NTX,,, | Performed by: INTERNAL MEDICINE

## 2018-01-01 PROCEDURE — 96374 THER/PROPH/DIAG INJ IV PUSH: CPT

## 2018-01-01 PROCEDURE — 27200188 HC TRANSDUCER, ART ADULT/PEDS: Mod: NTX

## 2018-01-01 PROCEDURE — 99223 1ST HOSP IP/OBS HIGH 75: CPT | Mod: ,,, | Performed by: SURGERY

## 2018-01-01 PROCEDURE — 93750 INTERROGATION VAD IN PERSON: CPT | Mod: PBBFAC,NTX | Performed by: INTERNAL MEDICINE

## 2018-01-01 PROCEDURE — 87338 HPYLORI STOOL AG IA: CPT | Mod: NTX

## 2018-01-01 PROCEDURE — 99213 OFFICE O/P EST LOW 20 MIN: CPT | Mod: PBBFAC,25,27,TXP | Performed by: INTERNAL MEDICINE

## 2018-01-01 PROCEDURE — 27100025 HC TUBING, SET FLUID WARMER: Performed by: NURSE ANESTHETIST, CERTIFIED REGISTERED

## 2018-01-01 PROCEDURE — 63600175 PHARM REV CODE 636 W HCPCS: Mod: NTX | Performed by: EMERGENCY MEDICINE

## 2018-01-01 PROCEDURE — 83605 ASSAY OF LACTIC ACID: CPT

## 2018-01-01 PROCEDURE — 36556 INSERT NON-TUNNEL CV CATH: CPT | Mod: NTX

## 2018-01-01 PROCEDURE — 71250 CT THORAX DX C-: CPT | Mod: 26,TXP,, | Performed by: RADIOLOGY

## 2018-01-01 PROCEDURE — 82248 BILIRUBIN DIRECT: CPT | Mod: TXP

## 2018-01-01 PROCEDURE — 99213 OFFICE O/P EST LOW 20 MIN: CPT | Mod: PBBFAC,25 | Performed by: INTERNAL MEDICINE

## 2018-01-01 PROCEDURE — 85379 FIBRIN DEGRADATION QUANT: CPT | Mod: NTX

## 2018-01-01 PROCEDURE — 99213 OFFICE O/P EST LOW 20 MIN: CPT | Mod: GC,NTX,, | Performed by: INTERNAL MEDICINE

## 2018-01-01 PROCEDURE — 81001 URINALYSIS AUTO W/SCOPE: CPT | Mod: NTX

## 2018-01-01 PROCEDURE — 97161 PT EVAL LOW COMPLEX 20 MIN: CPT

## 2018-01-01 PROCEDURE — 94010 BREATHING CAPACITY TEST: CPT | Mod: 26,S$PBB,, | Performed by: INTERNAL MEDICINE

## 2018-01-01 PROCEDURE — 99211 OFF/OP EST MAY X REQ PHY/QHP: CPT | Mod: PBBFAC,27,TXP,25

## 2018-01-01 PROCEDURE — C1751 CATH, INF, PER/CENT/MIDLINE: HCPCS | Performed by: NURSE ANESTHETIST, CERTIFIED REGISTERED

## 2018-01-01 PROCEDURE — 99213 OFFICE O/P EST LOW 20 MIN: CPT | Mod: PBBFAC,25,NTX

## 2018-01-01 PROCEDURE — 0DBB0ZZ EXCISION OF ILEUM, OPEN APPROACH: ICD-10-PCS | Performed by: SURGERY

## 2018-01-01 PROCEDURE — 93306 TTE W/DOPPLER COMPLETE: CPT | Mod: 26,S$PBB,NTX, | Performed by: INTERNAL MEDICINE

## 2018-01-01 PROCEDURE — 99900035 HC TECH TIME PER 15 MIN (STAT)

## 2018-01-01 PROCEDURE — 43236 UPPR GI SCOPE W/SUBMUC INJ: CPT | Performed by: INTERNAL MEDICINE

## 2018-01-01 PROCEDURE — 93306 TTE W/DOPPLER COMPLETE: CPT | Mod: PBBFAC,NTX | Performed by: INTERNAL MEDICINE

## 2018-01-01 PROCEDURE — 86706 HEP B SURFACE ANTIBODY: CPT

## 2018-01-01 PROCEDURE — 87517 HEPATITIS B DNA QUANT: CPT

## 2018-01-01 PROCEDURE — 80048 BASIC METABOLIC PNL TOTAL CA: CPT | Mod: 91

## 2018-01-01 PROCEDURE — C8929 TTE W OR WO FOL WCON,DOPPLER: HCPCS | Mod: PBBFAC,NTX | Performed by: INTERNAL MEDICINE

## 2018-01-01 PROCEDURE — 0BH17EZ INSERTION OF ENDOTRACHEAL AIRWAY INTO TRACHEA, VIA NATURAL OR ARTIFICIAL OPENING: ICD-10-PCS | Performed by: INTERNAL MEDICINE

## 2018-01-01 PROCEDURE — 44376 SMALL BOWEL ENDOSCOPY: CPT | Mod: ,,, | Performed by: INTERNAL MEDICINE

## 2018-01-01 PROCEDURE — 97165 OT EVAL LOW COMPLEX 30 MIN: CPT

## 2018-01-01 PROCEDURE — 93750 INTERROGATION VAD IN PERSON: CPT | Mod: 77,ICN,, | Performed by: THORACIC SURGERY (CARDIOTHORACIC VASCULAR SURGERY)

## 2018-01-01 PROCEDURE — 97530 THERAPEUTIC ACTIVITIES: CPT | Mod: NTX

## 2018-01-01 PROCEDURE — 83615 LACTATE (LD) (LDH) ENZYME: CPT | Mod: 91,NTX

## 2018-01-01 PROCEDURE — 94010 BREATHING CAPACITY TEST: CPT | Mod: PBBFAC | Performed by: INTERNAL MEDICINE

## 2018-01-01 PROCEDURE — 63600175 PHARM REV CODE 636 W HCPCS: Mod: JG | Performed by: INTERNAL MEDICINE

## 2018-01-01 PROCEDURE — 36569 INSJ PICC 5 YR+ W/O IMAGING: CPT | Mod: NTX

## 2018-01-01 PROCEDURE — 83010 ASSAY OF HAPTOGLOBIN QUANT: CPT | Mod: NTX

## 2018-01-01 PROCEDURE — 85730 THROMBOPLASTIN TIME PARTIAL: CPT | Mod: PO,TXP

## 2018-01-01 PROCEDURE — 93005 ELECTROCARDIOGRAM TRACING: CPT

## 2018-01-01 PROCEDURE — 99499 UNLISTED E&M SERVICE: CPT | Mod: NTX,,, | Performed by: PHYSICIAN ASSISTANT

## 2018-01-01 PROCEDURE — 82803 BLOOD GASES ANY COMBINATION: CPT

## 2018-01-01 PROCEDURE — G8979 MOBILITY GOAL STATUS: HCPCS | Mod: CJ,NTX

## 2018-01-01 PROCEDURE — 80048 BASIC METABOLIC PNL TOTAL CA: CPT | Mod: TXP

## 2018-01-01 PROCEDURE — 99999 PR PBB SHADOW E&M-EST. PATIENT-LVL III: CPT | Mod: PBBFAC,TXP,,

## 2018-01-01 PROCEDURE — 43235 EGD DIAGNOSTIC BRUSH WASH: CPT | Mod: 51,GC,NTX, | Performed by: INTERNAL MEDICINE

## 2018-01-01 PROCEDURE — 45378 DIAGNOSTIC COLONOSCOPY: CPT | Mod: NTX | Performed by: INTERNAL MEDICINE

## 2018-01-01 PROCEDURE — 99999 PR PBB SHADOW E&M-EST. PATIENT-LVL III: CPT | Mod: PBBFAC,TXP,, | Performed by: THORACIC SURGERY (CARDIOTHORACIC VASCULAR SURGERY)

## 2018-01-01 PROCEDURE — 99233 SBSQ HOSP IP/OBS HIGH 50: CPT | Mod: 25,GC,, | Performed by: INTERNAL MEDICINE

## 2018-01-01 PROCEDURE — 97165 OT EVAL LOW COMPLEX 30 MIN: CPT | Mod: NTX

## 2018-01-01 PROCEDURE — 31500 INSERT EMERGENCY AIRWAY: CPT | Mod: GC,,, | Performed by: ANESTHESIOLOGY

## 2018-01-01 PROCEDURE — 99285 EMERGENCY DEPT VISIT HI MDM: CPT | Mod: 25,27,NTX

## 2018-01-01 PROCEDURE — 88305 TISSUE EXAM BY PATHOLOGIST: CPT | Mod: 26,,, | Performed by: PATHOLOGY

## 2018-01-01 PROCEDURE — 93005 ELECTROCARDIOGRAM TRACING: CPT | Mod: PBBFAC,PO | Performed by: INTERNAL MEDICINE

## 2018-01-01 PROCEDURE — C9113 INJ PANTOPRAZOLE SODIUM, VIA: HCPCS | Mod: NTX | Performed by: EMERGENCY MEDICINE

## 2018-01-01 PROCEDURE — 99999 PR PBB SHADOW E&M-EST. PATIENT-LVL II: CPT | Mod: PBBFAC,TXP,,

## 2018-01-01 PROCEDURE — 84100 ASSAY OF PHOSPHORUS: CPT | Mod: 91

## 2018-01-01 PROCEDURE — 80048 BASIC METABOLIC PNL TOTAL CA: CPT | Mod: PO,TXP

## 2018-01-01 PROCEDURE — 99024 POSTOP FOLLOW-UP VISIT: CPT | Mod: POP,,, | Performed by: SURGERY

## 2018-01-01 PROCEDURE — 93750 INTERROGATION VAD IN PERSON: CPT | Mod: S$PBB,NTX,, | Performed by: INTERNAL MEDICINE

## 2018-01-01 PROCEDURE — 87070 CULTURE OTHR SPECIMN AEROBIC: CPT | Mod: NTX

## 2018-01-01 PROCEDURE — 85025 COMPLETE CBC W/AUTO DIFF WBC: CPT | Mod: TXP

## 2018-01-01 PROCEDURE — 85018 HEMOGLOBIN: CPT | Mod: NTX

## 2018-01-01 PROCEDURE — 99223 1ST HOSP IP/OBS HIGH 75: CPT | Mod: AI,,, | Performed by: INTERNAL MEDICINE

## 2018-01-01 RX ORDER — POLYETHYLENE GLYCOL 3350 17 G/17G
17 POWDER, FOR SOLUTION ORAL DAILY PRN
Status: DISCONTINUED | OUTPATIENT
Start: 2018-01-01 | End: 2018-01-01 | Stop reason: HOSPADM

## 2018-01-01 RX ORDER — WARFARIN SODIUM 5 MG/1
5 TABLET ORAL ONCE
Status: DISCONTINUED | OUTPATIENT
Start: 2018-01-01 | End: 2018-01-01

## 2018-01-01 RX ORDER — LISINOPRIL 2.5 MG/1
2.5 TABLET ORAL DAILY
Status: DISCONTINUED | OUTPATIENT
Start: 2018-01-01 | End: 2018-01-01 | Stop reason: HOSPADM

## 2018-01-01 RX ORDER — PROPOFOL 10 MG/ML
VIAL (ML) INTRAVENOUS
Status: DISCONTINUED | OUTPATIENT
Start: 2018-01-01 | End: 2018-01-01

## 2018-01-01 RX ORDER — AMIODARONE HYDROCHLORIDE 200 MG/1
600 TABLET ORAL DAILY
Status: DISCONTINUED | OUTPATIENT
Start: 2018-01-01 | End: 2018-01-01

## 2018-01-01 RX ORDER — POLYETHYLENE GLYCOL 3350, SODIUM SULFATE ANHYDROUS, SODIUM BICARBONATE, SODIUM CHLORIDE, POTASSIUM CHLORIDE 236; 22.74; 6.74; 5.86; 2.97 G/4L; G/4L; G/4L; G/4L; G/4L
4000 POWDER, FOR SOLUTION ORAL ONCE
Status: COMPLETED | OUTPATIENT
Start: 2018-01-01 | End: 2018-01-01

## 2018-01-01 RX ORDER — POTASSIUM CHLORIDE 20 MEQ/1
60 TABLET, EXTENDED RELEASE ORAL DAILY
Status: DISCONTINUED | OUTPATIENT
Start: 2018-01-01 | End: 2018-01-01

## 2018-01-01 RX ORDER — POTASSIUM CHLORIDE 20 MEQ/1
20 TABLET, EXTENDED RELEASE ORAL ONCE
Status: COMPLETED | OUTPATIENT
Start: 2018-01-01 | End: 2018-01-01

## 2018-01-01 RX ORDER — LISINOPRIL 5 MG/1
5 TABLET ORAL DAILY
Status: DISCONTINUED | OUTPATIENT
Start: 2018-01-01 | End: 2018-01-01

## 2018-01-01 RX ORDER — FUROSEMIDE 20 MG/1
20 TABLET ORAL 2 TIMES DAILY
Status: DISCONTINUED | OUTPATIENT
Start: 2018-01-01 | End: 2018-01-01 | Stop reason: ALTCHOICE

## 2018-01-01 RX ORDER — OXYCODONE AND ACETAMINOPHEN 5; 325 MG/1; MG/1
1 TABLET ORAL ONCE
Status: DISCONTINUED | OUTPATIENT
Start: 2018-01-01 | End: 2018-01-01

## 2018-01-01 RX ORDER — ONDANSETRON 2 MG/ML
4 INJECTION INTRAMUSCULAR; INTRAVENOUS EVERY 6 HOURS PRN
Status: DISCONTINUED | OUTPATIENT
Start: 2018-01-01 | End: 2018-01-01 | Stop reason: HOSPADM

## 2018-01-01 RX ORDER — POTASSIUM CHLORIDE 20 MEQ/1
60 TABLET, EXTENDED RELEASE ORAL ONCE
Status: DISCONTINUED | OUTPATIENT
Start: 2018-01-01 | End: 2018-01-01

## 2018-01-01 RX ORDER — HYDROCODONE BITARTRATE AND ACETAMINOPHEN 5; 325 MG/1; MG/1
1 TABLET ORAL EVERY 6 HOURS PRN
Status: DISCONTINUED | OUTPATIENT
Start: 2018-01-01 | End: 2018-01-01 | Stop reason: HOSPADM

## 2018-01-01 RX ORDER — ONDANSETRON 4 MG/1
4 TABLET, ORALLY DISINTEGRATING ORAL EVERY 6 HOURS PRN
Status: DISCONTINUED | OUTPATIENT
Start: 2018-01-01 | End: 2018-01-01 | Stop reason: HOSPADM

## 2018-01-01 RX ORDER — FUROSEMIDE 20 MG/1
20 TABLET ORAL 2 TIMES DAILY
Status: DISCONTINUED | OUTPATIENT
Start: 2018-01-01 | End: 2018-01-01 | Stop reason: HOSPADM

## 2018-01-01 RX ORDER — FENTANYL CITRATE 50 UG/ML
25 INJECTION, SOLUTION INTRAMUSCULAR; INTRAVENOUS EVERY 5 MIN PRN
Status: DISCONTINUED | OUTPATIENT
Start: 2018-01-01 | End: 2018-01-01 | Stop reason: HOSPADM

## 2018-01-01 RX ORDER — CIPROFLOXACIN 500 MG/1
500 TABLET ORAL 2 TIMES DAILY
Qty: 28 TABLET | Refills: 0 | Status: ON HOLD | OUTPATIENT
Start: 2018-01-01 | End: 2018-01-01 | Stop reason: HOSPADM

## 2018-01-01 RX ORDER — SODIUM CHLORIDE 9 MG/ML
INJECTION, SOLUTION INTRAVENOUS CONTINUOUS
Status: DISCONTINUED | OUTPATIENT
Start: 2018-01-01 | End: 2018-01-01

## 2018-01-01 RX ORDER — WARFARIN 3 MG/1
6 TABLET ORAL ONCE
Status: DISCONTINUED | OUTPATIENT
Start: 2018-01-01 | End: 2018-01-01

## 2018-01-01 RX ORDER — GLUCAGON 1 MG
1 KIT INJECTION
Status: DISCONTINUED | OUTPATIENT
Start: 2018-01-01 | End: 2018-01-01

## 2018-01-01 RX ORDER — PANTOPRAZOLE SODIUM 40 MG/10ML
40 INJECTION, POWDER, LYOPHILIZED, FOR SOLUTION INTRAVENOUS 2 TIMES DAILY
Status: DISCONTINUED | OUTPATIENT
Start: 2018-01-01 | End: 2018-01-01

## 2018-01-01 RX ORDER — DRONABINOL 2.5 MG/1
5 CAPSULE ORAL 3 TIMES DAILY
Status: DISCONTINUED | OUTPATIENT
Start: 2018-01-01 | End: 2018-01-01 | Stop reason: HOSPADM

## 2018-01-01 RX ORDER — PHENYLEPHRINE HYDROCHLORIDE 10 MG/ML
INJECTION INTRAVENOUS
Status: DISCONTINUED | OUTPATIENT
Start: 2018-01-01 | End: 2018-01-01

## 2018-01-01 RX ORDER — HEPARIN SODIUM 10000 [USP'U]/100ML
17 INJECTION, SOLUTION INTRAVENOUS CONTINUOUS
Status: DISCONTINUED | OUTPATIENT
Start: 2018-01-01 | End: 2018-01-01

## 2018-01-01 RX ORDER — AMIODARONE HYDROCHLORIDE 200 MG/1
600 TABLET ORAL DAILY
Qty: 90 TABLET | Refills: 11 | Status: ON HOLD | OUTPATIENT
Start: 2018-01-01 | End: 2018-01-01 | Stop reason: SDUPTHER

## 2018-01-01 RX ORDER — FUROSEMIDE 10 MG/ML
INJECTION INTRAMUSCULAR; INTRAVENOUS
Status: COMPLETED
Start: 2018-01-01 | End: 2018-01-01

## 2018-01-01 RX ORDER — POTASSIUM CHLORIDE 7.45 MG/ML
10 INJECTION INTRAVENOUS
Status: COMPLETED | OUTPATIENT
Start: 2018-01-01 | End: 2018-01-01

## 2018-01-01 RX ORDER — AMIODARONE HYDROCHLORIDE 200 MG/1
400 TABLET ORAL 3 TIMES DAILY
Status: DISCONTINUED | OUTPATIENT
Start: 2018-01-01 | End: 2018-01-01 | Stop reason: HOSPADM

## 2018-01-01 RX ORDER — WARFARIN 2 MG/1
2 TABLET ORAL DAILY
Status: DISCONTINUED | OUTPATIENT
Start: 2018-01-01 | End: 2018-01-01

## 2018-01-01 RX ORDER — FUROSEMIDE 10 MG/ML
40 INJECTION INTRAMUSCULAR; INTRAVENOUS ONCE
Status: COMPLETED | OUTPATIENT
Start: 2018-01-01 | End: 2018-01-01

## 2018-01-01 RX ORDER — WARFARIN SODIUM 5 MG/1
TABLET ORAL
Qty: 32 TABLET | Refills: 5 | Status: ON HOLD
Start: 2018-01-01 | End: 2018-01-01

## 2018-01-01 RX ORDER — WARFARIN 3 MG/1
3 TABLET ORAL DAILY
Status: DISCONTINUED | OUTPATIENT
Start: 2018-01-01 | End: 2018-01-01 | Stop reason: HOSPADM

## 2018-01-01 RX ORDER — WARFARIN 2.5 MG/1
2.5 TABLET ORAL
Status: DISCONTINUED | OUTPATIENT
Start: 2018-01-01 | End: 2018-01-01

## 2018-01-01 RX ORDER — AMIODARONE HYDROCHLORIDE 200 MG/1
400 TABLET ORAL DAILY
Status: DISCONTINUED | OUTPATIENT
Start: 2018-01-01 | End: 2018-01-01 | Stop reason: HOSPADM

## 2018-01-01 RX ORDER — WARFARIN 3 MG/1
3 TABLET ORAL DAILY
Status: DISCONTINUED | OUTPATIENT
Start: 2018-01-01 | End: 2018-01-01

## 2018-01-01 RX ORDER — SPIRONOLACTONE 25 MG/1
25 TABLET ORAL DAILY
Status: DISCONTINUED | OUTPATIENT
Start: 2018-01-01 | End: 2018-01-01

## 2018-01-01 RX ORDER — AMIODARONE HYDROCHLORIDE 200 MG/1
400 TABLET ORAL DAILY
Status: DISCONTINUED | OUTPATIENT
Start: 2018-01-01 | End: 2018-01-01

## 2018-01-01 RX ORDER — ETOMIDATE 2 MG/ML
INJECTION INTRAVENOUS
Status: DISCONTINUED | OUTPATIENT
Start: 2018-01-01 | End: 2018-01-01

## 2018-01-01 RX ORDER — SPIRONOLACTONE 25 MG/1
25 TABLET ORAL DAILY
Status: DISCONTINUED | OUTPATIENT
Start: 2018-01-01 | End: 2018-01-01 | Stop reason: HOSPADM

## 2018-01-01 RX ORDER — WARFARIN 2.5 MG/1
2.5 TABLET ORAL DAILY
Status: DISCONTINUED | OUTPATIENT
Start: 2018-01-01 | End: 2018-01-01 | Stop reason: HOSPADM

## 2018-01-01 RX ORDER — WARFARIN 2 MG/1
TABLET ORAL
Qty: 30 TABLET | Refills: 11 | Status: ON HOLD | OUTPATIENT
Start: 2018-01-01 | End: 2018-01-01 | Stop reason: SDUPTHER

## 2018-01-01 RX ORDER — MIRTAZAPINE 7.5 MG/1
7.5 TABLET, FILM COATED ORAL NIGHTLY
Qty: 30 TABLET | Refills: 11 | Status: SHIPPED | OUTPATIENT
Start: 2018-01-01 | End: 2019-09-28

## 2018-01-01 RX ORDER — GLYCOPYRROLATE 0.2 MG/ML
INJECTION INTRAMUSCULAR; INTRAVENOUS
Status: DISCONTINUED | OUTPATIENT
Start: 2018-01-01 | End: 2018-01-01

## 2018-01-01 RX ORDER — DRONABINOL 5 MG/1
5 CAPSULE ORAL
Qty: 90 CAPSULE | Refills: 5 | Status: SHIPPED | OUTPATIENT
Start: 2018-01-01 | End: 2018-01-01 | Stop reason: SDUPTHER

## 2018-01-01 RX ORDER — FUROSEMIDE 20 MG/1
20 TABLET ORAL 2 TIMES DAILY
Status: DISCONTINUED | OUTPATIENT
Start: 2018-01-01 | End: 2018-01-01

## 2018-01-01 RX ORDER — DRONABINOL 2.5 MG/1
5 CAPSULE ORAL
Status: DISCONTINUED | OUTPATIENT
Start: 2018-01-01 | End: 2018-01-01 | Stop reason: HOSPADM

## 2018-01-01 RX ORDER — AMLODIPINE BESYLATE 5 MG/1
TABLET ORAL
Status: DISPENSED
Start: 2018-01-01 | End: 2018-01-01

## 2018-01-01 RX ORDER — MIRTAZAPINE 7.5 MG/1
7.5 TABLET, FILM COATED ORAL NIGHTLY
Status: DISCONTINUED | OUTPATIENT
Start: 2018-01-01 | End: 2018-01-01 | Stop reason: HOSPADM

## 2018-01-01 RX ORDER — SODIUM CHLORIDE 0.9 % (FLUSH) 0.9 %
3 SYRINGE (ML) INJECTION EVERY 8 HOURS
Status: DISCONTINUED | OUTPATIENT
Start: 2018-01-01 | End: 2018-01-01 | Stop reason: HOSPADM

## 2018-01-01 RX ORDER — WARFARIN SODIUM 5 MG/1
5 TABLET ORAL DAILY
Status: DISCONTINUED | OUTPATIENT
Start: 2018-01-01 | End: 2018-01-01

## 2018-01-01 RX ORDER — POLYETHYLENE GLYCOL 3350 17 G/17G
17 POWDER, FOR SOLUTION ORAL DAILY PRN
Qty: 14 EACH | Refills: 0 | Status: SHIPPED | OUTPATIENT
Start: 2018-01-01

## 2018-01-01 RX ORDER — DOXYCYCLINE HYCLATE 100 MG
100 TABLET ORAL 2 TIMES DAILY
Qty: 14 TABLET | Refills: 0 | Status: SHIPPED | OUTPATIENT
Start: 2018-01-01 | End: 2018-01-01 | Stop reason: ALTCHOICE

## 2018-01-01 RX ORDER — SODIUM CHLORIDE 9 MG/ML
INJECTION, SOLUTION INTRAVENOUS CONTINUOUS PRN
Status: DISCONTINUED | OUTPATIENT
Start: 2018-01-01 | End: 2018-01-01

## 2018-01-01 RX ORDER — DIPHENHYDRAMINE HCL 25 MG
25 CAPSULE ORAL ONCE
Status: COMPLETED | OUTPATIENT
Start: 2018-01-01 | End: 2018-01-01

## 2018-01-01 RX ORDER — PANTOPRAZOLE SODIUM 40 MG/1
40 TABLET, DELAYED RELEASE ORAL DAILY
Status: DISCONTINUED | OUTPATIENT
Start: 2018-01-01 | End: 2018-01-01 | Stop reason: HOSPADM

## 2018-01-01 RX ORDER — HYDROCODONE BITARTRATE AND ACETAMINOPHEN 500; 5 MG/1; MG/1
TABLET ORAL
Status: DISCONTINUED | OUTPATIENT
Start: 2018-01-01 | End: 2018-01-01 | Stop reason: HOSPADM

## 2018-01-01 RX ORDER — FUROSEMIDE 10 MG/ML
60 INJECTION INTRAMUSCULAR; INTRAVENOUS 2 TIMES DAILY
Status: DISCONTINUED | OUTPATIENT
Start: 2018-01-01 | End: 2018-01-01

## 2018-01-01 RX ORDER — HYDRALAZINE HYDROCHLORIDE 20 MG/ML
5 INJECTION INTRAMUSCULAR; INTRAVENOUS ONCE
Status: COMPLETED | OUTPATIENT
Start: 2018-01-01 | End: 2018-01-01

## 2018-01-01 RX ORDER — PANTOPRAZOLE SODIUM 40 MG/1
40 TABLET, DELAYED RELEASE ORAL 2 TIMES DAILY
Status: DISCONTINUED | OUTPATIENT
Start: 2018-01-01 | End: 2018-01-01 | Stop reason: HOSPADM

## 2018-01-01 RX ORDER — ONDANSETRON 2 MG/ML
4 INJECTION INTRAMUSCULAR; INTRAVENOUS
Status: COMPLETED | OUTPATIENT
Start: 2018-01-01 | End: 2018-01-01

## 2018-01-01 RX ORDER — PRAVASTATIN SODIUM 20 MG/1
20 TABLET ORAL NIGHTLY
Status: DISCONTINUED | OUTPATIENT
Start: 2018-01-01 | End: 2018-01-01 | Stop reason: HOSPADM

## 2018-01-01 RX ORDER — LISINOPRIL 5 MG/1
TABLET ORAL
Status: DISPENSED
Start: 2018-01-01 | End: 2018-01-01

## 2018-01-01 RX ORDER — LISINOPRIL 2.5 MG/1
2.5 TABLET ORAL DAILY
Qty: 90 TABLET | Refills: 3 | Status: SHIPPED | OUTPATIENT
Start: 2018-01-01 | End: 2018-01-01

## 2018-01-01 RX ORDER — WARFARIN SODIUM 5 MG/1
5 TABLET ORAL DAILY
Status: DISCONTINUED | OUTPATIENT
Start: 2018-01-01 | End: 2018-01-01 | Stop reason: HOSPADM

## 2018-01-01 RX ORDER — CEFAZOLIN SODIUM 1 G/3ML
2 INJECTION, POWDER, FOR SOLUTION INTRAMUSCULAR; INTRAVENOUS
Status: COMPLETED | OUTPATIENT
Start: 2018-01-01 | End: 2018-01-01

## 2018-01-01 RX ORDER — BACLOFEN 10 MG/1
10 TABLET ORAL 3 TIMES DAILY PRN
Status: DISCONTINUED | OUTPATIENT
Start: 2018-01-01 | End: 2018-01-01 | Stop reason: HOSPADM

## 2018-01-01 RX ORDER — FUROSEMIDE 10 MG/ML
40 INJECTION INTRAMUSCULAR; INTRAVENOUS ONCE
Status: DISCONTINUED | OUTPATIENT
Start: 2018-01-01 | End: 2018-01-01

## 2018-01-01 RX ORDER — HYDROCODONE BITARTRATE AND ACETAMINOPHEN 500; 5 MG/1; MG/1
TABLET ORAL
Status: DISCONTINUED | OUTPATIENT
Start: 2018-01-01 | End: 2018-01-01

## 2018-01-01 RX ORDER — DOXYCYCLINE HYCLATE 100 MG
100 TABLET ORAL 2 TIMES DAILY
Status: DISCONTINUED | OUTPATIENT
Start: 2018-01-01 | End: 2018-01-01

## 2018-01-01 RX ORDER — LANOLIN ALCOHOL/MO/W.PET/CERES
800 CREAM (GRAM) TOPICAL 3 TIMES DAILY
Status: DISCONTINUED | OUTPATIENT
Start: 2018-01-01 | End: 2018-01-01

## 2018-01-01 RX ORDER — FENTANYL CITRATE 50 UG/ML
INJECTION, SOLUTION INTRAMUSCULAR; INTRAVENOUS
Status: COMPLETED
Start: 2018-01-01 | End: 2018-01-01

## 2018-01-01 RX ORDER — NEOSTIGMINE METHYLSULFATE 1 MG/ML
INJECTION, SOLUTION INTRAVENOUS
Status: DISCONTINUED | OUTPATIENT
Start: 2018-01-01 | End: 2018-01-01

## 2018-01-01 RX ORDER — HYDRALAZINE HYDROCHLORIDE 20 MG/ML
10 INJECTION INTRAMUSCULAR; INTRAVENOUS EVERY 6 HOURS PRN
Status: DISCONTINUED | OUTPATIENT
Start: 2018-01-01 | End: 2018-01-01

## 2018-01-01 RX ORDER — FUROSEMIDE 20 MG/1
20 TABLET ORAL 2 TIMES DAILY
Qty: 60 TABLET | Refills: 11 | Status: ON HOLD | OUTPATIENT
Start: 2018-01-01 | End: 2018-01-01 | Stop reason: HOSPADM

## 2018-01-01 RX ORDER — WARFARIN 3 MG/1
3 TABLET ORAL ONCE
Status: DISCONTINUED | OUTPATIENT
Start: 2018-01-01 | End: 2018-01-01 | Stop reason: HOSPADM

## 2018-01-01 RX ORDER — PANTOPRAZOLE SODIUM 40 MG/1
40 TABLET, DELAYED RELEASE ORAL DAILY
Status: DISCONTINUED | OUTPATIENT
Start: 2018-01-01 | End: 2018-01-01

## 2018-01-01 RX ORDER — MEXILETINE HYDROCHLORIDE 200 MG/1
200 CAPSULE ORAL EVERY 12 HOURS
Qty: 60 CAPSULE | Refills: 11 | Status: ON HOLD | OUTPATIENT
Start: 2018-01-01 | End: 2018-01-01 | Stop reason: HOSPADM

## 2018-01-01 RX ORDER — POLYETHYLENE GLYCOL 3350 17 G/17G
17 POWDER, FOR SOLUTION ORAL 2 TIMES DAILY PRN
Status: DISCONTINUED | OUTPATIENT
Start: 2018-01-01 | End: 2018-01-01

## 2018-01-01 RX ORDER — MIDAZOLAM HYDROCHLORIDE 1 MG/ML
INJECTION, SOLUTION INTRAMUSCULAR; INTRAVENOUS
Status: DISCONTINUED | OUTPATIENT
Start: 2018-01-01 | End: 2018-01-01

## 2018-01-01 RX ORDER — FUROSEMIDE 20 MG/1
20 TABLET ORAL 2 TIMES DAILY
Qty: 60 TABLET | Refills: 11 | Status: SHIPPED | OUTPATIENT
Start: 2018-01-01 | End: 2018-01-01 | Stop reason: SDUPTHER

## 2018-01-01 RX ORDER — WARFARIN SODIUM 5 MG/1
5 TABLET ORAL ONCE
Status: COMPLETED | OUTPATIENT
Start: 2018-01-01 | End: 2018-01-01

## 2018-01-01 RX ORDER — HEPARIN SODIUM,PORCINE/D5W 25000/250
17 INTRAVENOUS SOLUTION INTRAVENOUS CONTINUOUS
Status: DISCONTINUED | OUTPATIENT
Start: 2018-01-01 | End: 2018-01-01

## 2018-01-01 RX ORDER — SUCCINYLCHOLINE CHLORIDE 20 MG/ML
INJECTION INTRAMUSCULAR; INTRAVENOUS
Status: DISCONTINUED | OUTPATIENT
Start: 2018-01-01 | End: 2018-01-01

## 2018-01-01 RX ORDER — CEFEPIME HYDROCHLORIDE 2 G/1
2 INJECTION, POWDER, FOR SOLUTION INTRAVENOUS
Status: DISCONTINUED | OUTPATIENT
Start: 2018-01-01 | End: 2018-01-01

## 2018-01-01 RX ORDER — ACETAMINOPHEN 325 MG/1
650 TABLET ORAL EVERY 6 HOURS PRN
Status: DISCONTINUED | OUTPATIENT
Start: 2018-01-01 | End: 2018-01-01 | Stop reason: HOSPADM

## 2018-01-01 RX ORDER — SODIUM CHLORIDE 0.9 % (FLUSH) 0.9 %
10 SYRINGE (ML) INJECTION EVERY 6 HOURS
Status: DISCONTINUED | OUTPATIENT
Start: 2018-01-01 | End: 2018-01-01

## 2018-01-01 RX ORDER — MAGNESIUM SULFATE HEPTAHYDRATE 40 MG/ML
2 INJECTION, SOLUTION INTRAVENOUS ONCE
Status: COMPLETED | OUTPATIENT
Start: 2018-01-01 | End: 2018-01-01

## 2018-01-01 RX ORDER — PROPOFOL 10 MG/ML
VIAL (ML) INTRAVENOUS CONTINUOUS PRN
Status: DISCONTINUED | OUTPATIENT
Start: 2018-01-01 | End: 2018-01-01

## 2018-01-01 RX ORDER — ROCURONIUM BROMIDE 10 MG/ML
INJECTION, SOLUTION INTRAVENOUS
Status: DISCONTINUED | OUTPATIENT
Start: 2018-01-01 | End: 2018-01-01

## 2018-01-01 RX ORDER — AMLODIPINE BESYLATE 5 MG/1
5 TABLET ORAL DAILY
Status: DISCONTINUED | OUTPATIENT
Start: 2018-01-01 | End: 2018-01-01

## 2018-01-01 RX ORDER — MUPIROCIN 20 MG/G
1 OINTMENT TOPICAL 2 TIMES DAILY
Status: DISCONTINUED | OUTPATIENT
Start: 2018-01-01 | End: 2018-01-01

## 2018-01-01 RX ORDER — WARFARIN SODIUM 5 MG/1
5 TABLET ORAL
Status: DISCONTINUED | OUTPATIENT
Start: 2018-01-01 | End: 2018-01-01

## 2018-01-01 RX ORDER — WARFARIN 1 MG/1
2 TABLET ORAL DAILY
Status: DISCONTINUED | OUTPATIENT
Start: 2018-01-01 | End: 2018-01-01

## 2018-01-01 RX ORDER — LISINOPRIL 5 MG/1
5 TABLET ORAL DAILY
Qty: 90 TABLET | Refills: 3 | Status: SHIPPED | OUTPATIENT
Start: 2018-01-01 | End: 2018-01-01 | Stop reason: SDUPTHER

## 2018-01-01 RX ORDER — VASOPRESSIN 20 [USP'U]/ML
INJECTION, SOLUTION INTRAMUSCULAR; SUBCUTANEOUS
Status: DISCONTINUED | OUTPATIENT
Start: 2018-01-01 | End: 2018-01-01

## 2018-01-01 RX ORDER — PANTOPRAZOLE SODIUM 40 MG/10ML
80 INJECTION, POWDER, LYOPHILIZED, FOR SOLUTION INTRAVENOUS 2 TIMES DAILY
Status: DISCONTINUED | OUTPATIENT
Start: 2018-01-01 | End: 2018-01-01

## 2018-01-01 RX ORDER — POTASSIUM CHLORIDE 750 MG/1
60 CAPSULE, EXTENDED RELEASE ORAL DAILY
Status: DISCONTINUED | OUTPATIENT
Start: 2018-01-01 | End: 2018-01-01

## 2018-01-01 RX ORDER — NOREPINEPHRINE BITARTRATE/D5W 4MG/250ML
PLASTIC BAG, INJECTION (ML) INTRAVENOUS
Status: DISPENSED
Start: 2018-01-01 | End: 2018-01-01

## 2018-01-01 RX ORDER — HYDRALAZINE HYDROCHLORIDE 50 MG/1
50 TABLET, FILM COATED ORAL EVERY 8 HOURS
Status: DISCONTINUED | OUTPATIENT
Start: 2018-01-01 | End: 2018-01-01

## 2018-01-01 RX ORDER — DOXYCYCLINE HYCLATE 100 MG
100 TABLET ORAL 2 TIMES DAILY
Qty: 28 TABLET | Refills: 0 | Status: ON HOLD | OUTPATIENT
Start: 2018-01-01 | End: 2018-01-01 | Stop reason: HOSPADM

## 2018-01-01 RX ORDER — AMLODIPINE BESYLATE 10 MG/1
10 TABLET ORAL DAILY
Status: DISCONTINUED | OUTPATIENT
Start: 2018-01-01 | End: 2018-01-01

## 2018-01-01 RX ORDER — ONDANSETRON 2 MG/ML
INJECTION INTRAMUSCULAR; INTRAVENOUS
Status: COMPLETED
Start: 2018-01-01 | End: 2018-01-01

## 2018-01-01 RX ORDER — POTASSIUM CHLORIDE 7.45 MG/ML
INJECTION INTRAVENOUS
Status: DISPENSED
Start: 2018-01-01 | End: 2018-01-01

## 2018-01-01 RX ORDER — POTASSIUM CHLORIDE 750 MG/1
30 CAPSULE, EXTENDED RELEASE ORAL ONCE
Status: COMPLETED | OUTPATIENT
Start: 2018-01-01 | End: 2018-01-01

## 2018-01-01 RX ORDER — SODIUM FERRIC GLUCONATE COMPLEX IN SUCROSE 12.5 MG/ML
250 INJECTION INTRAVENOUS EVERY 12 HOURS
Status: DISCONTINUED | OUTPATIENT
Start: 2018-01-01 | End: 2018-01-01

## 2018-01-01 RX ORDER — POTASSIUM CHLORIDE 750 MG/1
50 CAPSULE, EXTENDED RELEASE ORAL
Status: COMPLETED | OUTPATIENT
Start: 2018-01-01 | End: 2018-01-01

## 2018-01-01 RX ORDER — SODIUM CHLORIDE 0.9 % (FLUSH) 0.9 %
3 SYRINGE (ML) INJECTION
Status: DISCONTINUED | OUTPATIENT
Start: 2018-01-01 | End: 2018-01-01 | Stop reason: HOSPADM

## 2018-01-01 RX ORDER — WARFARIN 3 MG/1
6 TABLET ORAL ONCE
Status: COMPLETED | OUTPATIENT
Start: 2018-01-01 | End: 2018-01-01

## 2018-01-01 RX ORDER — AMLODIPINE BESYLATE 5 MG/1
5 TABLET ORAL DAILY
Status: DISCONTINUED | OUTPATIENT
Start: 2018-01-01 | End: 2018-01-01 | Stop reason: HOSPADM

## 2018-01-01 RX ORDER — HYDROCODONE BITARTRATE AND ACETAMINOPHEN 5; 325 MG/1; MG/1
1 TABLET ORAL EVERY 6 HOURS PRN
COMMUNITY
End: 2018-01-01

## 2018-01-01 RX ORDER — PROPOFOL 10 MG/ML
INJECTION, EMULSION INTRAVENOUS
Status: COMPLETED
Start: 2018-01-01 | End: 2018-01-01

## 2018-01-01 RX ORDER — POLYETHYLENE GLYCOL 3350 17 G/17G
17 POWDER, FOR SOLUTION ORAL 2 TIMES DAILY PRN
Status: DISCONTINUED | OUTPATIENT
Start: 2018-01-01 | End: 2018-01-01 | Stop reason: HOSPADM

## 2018-01-01 RX ORDER — PRAVASTATIN SODIUM 20 MG/1
20 TABLET ORAL NIGHTLY
Status: DISCONTINUED | OUTPATIENT
Start: 2018-01-01 | End: 2018-01-01

## 2018-01-01 RX ORDER — EPINEPHRINE 0.1 MG/ML
INJECTION INTRAVENOUS CODE/TRAUMA/SEDATION MEDICATION
Status: COMPLETED | OUTPATIENT
Start: 2018-01-01 | End: 2018-01-01

## 2018-01-01 RX ORDER — PHENYLEPHRINE HCL IN 0.9% NACL 1 MG/10 ML
SYRINGE (ML) INTRAVENOUS
Status: DISCONTINUED | OUTPATIENT
Start: 2018-01-01 | End: 2018-01-01

## 2018-01-01 RX ORDER — HYDRALAZINE HYDROCHLORIDE 25 MG/1
25 TABLET, FILM COATED ORAL ONCE
Status: DISCONTINUED | OUTPATIENT
Start: 2018-01-01 | End: 2018-01-01

## 2018-01-01 RX ORDER — POTASSIUM CHLORIDE 20 MEQ/1
40 TABLET, EXTENDED RELEASE ORAL DAILY
Status: DISCONTINUED | OUTPATIENT
Start: 2018-01-01 | End: 2018-01-01

## 2018-01-01 RX ORDER — HYDRALAZINE HYDROCHLORIDE 20 MG/ML
10 INJECTION INTRAMUSCULAR; INTRAVENOUS ONCE
Status: COMPLETED | OUTPATIENT
Start: 2018-01-01 | End: 2018-01-01

## 2018-01-01 RX ORDER — PANTOPRAZOLE SODIUM 40 MG/1
40 TABLET, DELAYED RELEASE ORAL
Status: DISCONTINUED | OUTPATIENT
Start: 2018-01-01 | End: 2018-01-01 | Stop reason: HOSPADM

## 2018-01-01 RX ORDER — PANTOPRAZOLE SODIUM 40 MG/1
40 TABLET, DELAYED RELEASE ORAL 2 TIMES DAILY
Status: DISCONTINUED | OUTPATIENT
Start: 2018-01-01 | End: 2018-01-01

## 2018-01-01 RX ORDER — MEXILETINE HYDROCHLORIDE 200 MG/1
200 CAPSULE ORAL EVERY 12 HOURS
Status: DISCONTINUED | OUTPATIENT
Start: 2018-01-01 | End: 2018-01-01 | Stop reason: HOSPADM

## 2018-01-01 RX ORDER — ACETAMINOPHEN 325 MG/1
650 TABLET ORAL EVERY 6 HOURS PRN
Status: DISCONTINUED | OUTPATIENT
Start: 2018-01-01 | End: 2018-01-01

## 2018-01-01 RX ORDER — WARFARIN 7.5 MG/1
7.5 TABLET ORAL ONCE
Status: COMPLETED | OUTPATIENT
Start: 2018-01-01 | End: 2018-01-01

## 2018-01-01 RX ORDER — AMIODARONE HYDROCHLORIDE 200 MG/1
200 TABLET ORAL ONCE
Status: COMPLETED | OUTPATIENT
Start: 2018-01-01 | End: 2018-01-01

## 2018-01-01 RX ORDER — LANOLIN ALCOHOL/MO/W.PET/CERES
400 CREAM (GRAM) TOPICAL 2 TIMES DAILY
Status: DISCONTINUED | OUTPATIENT
Start: 2018-01-01 | End: 2018-01-01 | Stop reason: HOSPADM

## 2018-01-01 RX ORDER — MAGNESIUM SULFATE HEPTAHYDRATE 40 MG/ML
2 INJECTION, SOLUTION INTRAVENOUS ONCE
Status: DISCONTINUED | OUTPATIENT
Start: 2018-01-01 | End: 2018-01-01

## 2018-01-01 RX ORDER — POTASSIUM CHLORIDE 20 MEQ/1
20 TABLET, EXTENDED RELEASE ORAL DAILY
Qty: 30 TABLET | Refills: 3 | Status: ON HOLD | OUTPATIENT
Start: 2018-01-01 | End: 2018-01-01 | Stop reason: HOSPADM

## 2018-01-01 RX ORDER — FUROSEMIDE 10 MG/ML
20 INJECTION INTRAMUSCULAR; INTRAVENOUS ONCE
Status: COMPLETED | OUTPATIENT
Start: 2018-01-01 | End: 2018-01-01

## 2018-01-01 RX ORDER — DRONABINOL 2.5 MG/1
5 CAPSULE ORAL 2 TIMES DAILY
Status: DISCONTINUED | OUTPATIENT
Start: 2018-01-01 | End: 2018-01-01 | Stop reason: HOSPADM

## 2018-01-01 RX ORDER — POTASSIUM CHLORIDE 20 MEQ/1
40 TABLET, EXTENDED RELEASE ORAL ONCE
Status: DISCONTINUED | OUTPATIENT
Start: 2018-01-01 | End: 2018-01-01

## 2018-01-01 RX ORDER — VANCOMYCIN HCL IN 5 % DEXTROSE 1.5G/250ML
1500 PLASTIC BAG, INJECTION (ML) INTRAVENOUS DAILY
Refills: 1 | Status: ON HOLD
Start: 2018-01-01 | End: 2018-01-01 | Stop reason: HOSPADM

## 2018-01-01 RX ORDER — EPINEPHRINE 1 MG/ML
INJECTION, SOLUTION INTRACARDIAC; INTRAMUSCULAR; INTRAVENOUS; SUBCUTANEOUS
Status: DISCONTINUED | OUTPATIENT
Start: 2018-01-01 | End: 2018-01-01

## 2018-01-01 RX ORDER — POLYETHYLENE GLYCOL 3350, SODIUM SULFATE ANHYDROUS, SODIUM BICARBONATE, SODIUM CHLORIDE, POTASSIUM CHLORIDE 236; 22.74; 6.74; 5.86; 2.97 G/4L; G/4L; G/4L; G/4L; G/4L
2000 POWDER, FOR SOLUTION ORAL ONCE
Status: COMPLETED | OUTPATIENT
Start: 2018-01-01 | End: 2018-01-01

## 2018-01-01 RX ORDER — LIDOCAINE HCL/PF 100 MG/5ML
SYRINGE (ML) INTRAVENOUS
Status: DISCONTINUED | OUTPATIENT
Start: 2018-01-01 | End: 2018-01-01

## 2018-01-01 RX ORDER — NOREPINEPHRINE BITARTRATE/D5W 4MG/250ML
0.02 PLASTIC BAG, INJECTION (ML) INTRAVENOUS CONTINUOUS
Status: DISCONTINUED | OUTPATIENT
Start: 2018-01-01 | End: 2018-01-01 | Stop reason: HOSPADM

## 2018-01-01 RX ORDER — CIPROFLOXACIN 500 MG/1
500 TABLET ORAL 2 TIMES DAILY
Qty: 7 TABLET | Refills: 0 | Status: SHIPPED | OUTPATIENT
Start: 2018-01-01 | End: 2018-01-01 | Stop reason: ALTCHOICE

## 2018-01-01 RX ORDER — PANTOPRAZOLE SODIUM 40 MG/1
40 TABLET, DELAYED RELEASE ORAL 2 TIMES DAILY
Qty: 60 TABLET | Refills: 11 | Status: SHIPPED | OUTPATIENT
Start: 2018-01-01

## 2018-01-01 RX ORDER — PANTOPRAZOLE SODIUM 40 MG/10ML
80 INJECTION, POWDER, LYOPHILIZED, FOR SOLUTION INTRAVENOUS 2 TIMES DAILY
Status: DISCONTINUED | OUTPATIENT
Start: 2018-01-01 | End: 2018-01-01 | Stop reason: HOSPADM

## 2018-01-01 RX ORDER — WARFARIN 7.5 MG/1
7.5 TABLET ORAL DAILY
Status: DISCONTINUED | OUTPATIENT
Start: 2018-01-01 | End: 2018-01-01

## 2018-01-01 RX ORDER — SODIUM BICARBONATE 1 MEQ/ML
SYRINGE (ML) INTRAVENOUS CODE/TRAUMA/SEDATION MEDICATION
Status: COMPLETED | OUTPATIENT
Start: 2018-01-01 | End: 2018-01-01

## 2018-01-01 RX ORDER — DRONABINOL 2.5 MG/1
CAPSULE ORAL
Status: COMPLETED
Start: 2018-01-01 | End: 2018-01-01

## 2018-01-01 RX ORDER — WARFARIN 2 MG/1
TABLET ORAL
COMMUNITY
Start: 2018-01-01 | End: 2018-01-01 | Stop reason: CLARIF

## 2018-01-01 RX ORDER — ACETAMINOPHEN 500 MG
500 TABLET ORAL
Status: COMPLETED | OUTPATIENT
Start: 2018-01-01 | End: 2018-01-01

## 2018-01-01 RX ORDER — KETAMINE HCL IN 0.9 % NACL 50 MG/5 ML
SYRINGE (ML) INTRAVENOUS
Status: DISCONTINUED | OUTPATIENT
Start: 2018-01-01 | End: 2018-01-01

## 2018-01-01 RX ORDER — POTASSIUM CHLORIDE 750 MG/1
20 CAPSULE, EXTENDED RELEASE ORAL DAILY
Status: DISCONTINUED | OUTPATIENT
Start: 2018-01-01 | End: 2018-01-01 | Stop reason: HOSPADM

## 2018-01-01 RX ORDER — FERROUS SULFATE 325(65) MG
325 TABLET, DELAYED RELEASE (ENTERIC COATED) ORAL 3 TIMES DAILY
Status: DISCONTINUED | OUTPATIENT
Start: 2018-01-01 | End: 2018-01-01 | Stop reason: HOSPADM

## 2018-01-01 RX ORDER — TRAMADOL HYDROCHLORIDE 50 MG/1
50 TABLET ORAL EVERY 6 HOURS PRN
Status: DISCONTINUED | OUTPATIENT
Start: 2018-01-01 | End: 2018-01-01 | Stop reason: HOSPADM

## 2018-01-01 RX ORDER — WARFARIN 2.5 MG/1
2.5 TABLET ORAL ONCE
Status: COMPLETED | OUTPATIENT
Start: 2018-01-01 | End: 2018-01-01

## 2018-01-01 RX ORDER — NOREPINEPHRINE BITARTRATE 1 MG/ML
INJECTION, SOLUTION INTRAVENOUS
Status: DISCONTINUED
Start: 2018-01-01 | End: 2018-01-01 | Stop reason: HOSPADM

## 2018-01-01 RX ORDER — HYDRALAZINE HYDROCHLORIDE 25 MG/1
25 TABLET, FILM COATED ORAL ONCE
Status: COMPLETED | OUTPATIENT
Start: 2018-01-01 | End: 2018-01-01

## 2018-01-01 RX ORDER — POTASSIUM CHLORIDE 750 MG/1
60 CAPSULE, EXTENDED RELEASE ORAL ONCE
Status: COMPLETED | OUTPATIENT
Start: 2018-01-01 | End: 2018-01-01

## 2018-01-01 RX ORDER — LISINOPRIL 5 MG/1
5 TABLET ORAL DAILY
Qty: 90 TABLET | Refills: 3 | Status: SHIPPED | OUTPATIENT
Start: 2018-01-01 | End: 2019-11-14

## 2018-01-01 RX ORDER — POTASSIUM CHLORIDE 750 MG/1
20 CAPSULE, EXTENDED RELEASE ORAL ONCE
Status: DISCONTINUED | OUTPATIENT
Start: 2018-01-01 | End: 2018-01-01

## 2018-01-01 RX ORDER — WARFARIN 2.5 MG/1
2.5 TABLET ORAL DAILY
Status: COMPLETED | OUTPATIENT
Start: 2018-01-01 | End: 2018-01-01

## 2018-01-01 RX ORDER — AMLODIPINE BESYLATE 5 MG/1
5 TABLET ORAL DAILY
Qty: 90 TABLET | Refills: 3 | Status: ON HOLD
Start: 2018-01-01 | End: 2018-01-01 | Stop reason: HOSPADM

## 2018-01-01 RX ORDER — LANOLIN ALCOHOL/MO/W.PET/CERES
400 CREAM (GRAM) TOPICAL DAILY
Status: DISCONTINUED | OUTPATIENT
Start: 2018-01-01 | End: 2018-01-01

## 2018-01-01 RX ORDER — VASOPRESSIN 20 [USP'U]/ML
INJECTION, SOLUTION INTRAMUSCULAR; SUBCUTANEOUS
Status: DISCONTINUED
Start: 2018-01-01 | End: 2018-01-01 | Stop reason: HOSPADM

## 2018-01-01 RX ORDER — ACETAMINOPHEN 325 MG/1
TABLET ORAL
Status: COMPLETED
Start: 2018-01-01 | End: 2018-01-01

## 2018-01-01 RX ORDER — LISINOPRIL 5 MG/1
5 TABLET ORAL DAILY
Status: DISCONTINUED | OUTPATIENT
Start: 2018-01-01 | End: 2018-01-01 | Stop reason: HOSPADM

## 2018-01-01 RX ORDER — AMIODARONE HYDROCHLORIDE 200 MG/1
600 TABLET ORAL DAILY
Status: DISCONTINUED | OUTPATIENT
Start: 2018-01-01 | End: 2018-01-01 | Stop reason: HOSPADM

## 2018-01-01 RX ORDER — HYDROCODONE BITARTRATE AND ACETAMINOPHEN 5; 325 MG/1; MG/1
1 TABLET ORAL EVERY 6 HOURS PRN
Qty: 28 TABLET | Refills: 0 | Status: SHIPPED | OUTPATIENT
Start: 2018-01-01 | End: 2018-01-01

## 2018-01-01 RX ORDER — POLYETHYLENE GLYCOL 3350 17 G/17G
17 POWDER, FOR SOLUTION ORAL DAILY
Status: DISCONTINUED | OUTPATIENT
Start: 2018-01-01 | End: 2018-01-01 | Stop reason: HOSPADM

## 2018-01-01 RX ORDER — POTASSIUM CHLORIDE 750 MG/1
10 CAPSULE, EXTENDED RELEASE ORAL ONCE
Status: COMPLETED | OUTPATIENT
Start: 2018-01-01 | End: 2018-01-01

## 2018-01-01 RX ORDER — VANCOMYCIN HCL IN 5 % DEXTROSE 1G/250ML
1000 PLASTIC BAG, INJECTION (ML) INTRAVENOUS
Status: DISCONTINUED | OUTPATIENT
Start: 2018-01-01 | End: 2018-01-01

## 2018-01-01 RX ORDER — PROPOFOL 10 MG/ML
INJECTION, EMULSION INTRAVENOUS
Status: DISCONTINUED
Start: 2018-01-01 | End: 2018-01-01 | Stop reason: HOSPADM

## 2018-01-01 RX ORDER — AMIODARONE HYDROCHLORIDE 200 MG/1
400 TABLET ORAL ONCE
Status: COMPLETED | OUTPATIENT
Start: 2018-01-01 | End: 2018-01-01

## 2018-01-01 RX ORDER — POTASSIUM CHLORIDE 750 MG/1
50 CAPSULE, EXTENDED RELEASE ORAL ONCE
Status: COMPLETED | OUTPATIENT
Start: 2018-01-01 | End: 2018-01-01

## 2018-01-01 RX ORDER — FUROSEMIDE 20 MG/1
20 TABLET ORAL DAILY
Status: DISCONTINUED | OUTPATIENT
Start: 2018-01-01 | End: 2018-01-01

## 2018-01-01 RX ORDER — BENZONATATE 100 MG/1
100 CAPSULE ORAL 3 TIMES DAILY PRN
Status: DISCONTINUED | OUTPATIENT
Start: 2018-01-01 | End: 2018-01-01 | Stop reason: HOSPADM

## 2018-01-01 RX ORDER — AMLODIPINE BESYLATE 5 MG/1
5 TABLET ORAL DAILY
Qty: 30 TABLET | Refills: 11 | Status: ON HOLD | OUTPATIENT
Start: 2018-01-01 | End: 2018-01-01 | Stop reason: HOSPADM

## 2018-01-01 RX ORDER — WARFARIN SODIUM 5 MG/1
TABLET ORAL
Qty: 32 TABLET | Refills: 5 | Status: ON HOLD
Start: 2018-01-01 | End: 2018-01-01 | Stop reason: SDUPTHER

## 2018-01-01 RX ORDER — VANCOMYCIN HCL IN 5 % DEXTROSE 1.5G/250ML
1500 PLASTIC BAG, INJECTION (ML) INTRAVENOUS
Status: DISCONTINUED | OUTPATIENT
Start: 2018-01-01 | End: 2018-01-01

## 2018-01-01 RX ORDER — DRONABINOL 2.5 MG/1
5 CAPSULE ORAL
Status: DISCONTINUED | OUTPATIENT
Start: 2018-01-01 | End: 2018-01-01

## 2018-01-01 RX ORDER — SODIUM BICARBONATE 1 MEQ/ML
SYRINGE (ML) INTRAVENOUS
Status: DISCONTINUED
Start: 2018-01-01 | End: 2018-01-01 | Stop reason: HOSPADM

## 2018-01-01 RX ORDER — LIDOCAINE HYDROCHLORIDE 20 MG/ML
SOLUTION OROPHARYNGEAL
Status: DISCONTINUED | OUTPATIENT
Start: 2018-01-01 | End: 2018-01-01

## 2018-01-01 RX ORDER — DIPHENHYDRAMINE HYDROCHLORIDE 50 MG/ML
12.5 INJECTION INTRAMUSCULAR; INTRAVENOUS ONCE
Status: COMPLETED | OUTPATIENT
Start: 2018-01-01 | End: 2018-01-01

## 2018-01-01 RX ORDER — MIDAZOLAM HYDROCHLORIDE 1 MG/ML
INJECTION INTRAMUSCULAR; INTRAVENOUS
Status: DISCONTINUED
Start: 2018-01-01 | End: 2018-01-01 | Stop reason: HOSPADM

## 2018-01-01 RX ORDER — DOXYCYCLINE HYCLATE 100 MG
100 TABLET ORAL EVERY 12 HOURS
Status: DISCONTINUED | OUTPATIENT
Start: 2018-01-01 | End: 2018-01-01 | Stop reason: HOSPADM

## 2018-01-01 RX ORDER — FUROSEMIDE 40 MG/1
40 TABLET ORAL 2 TIMES DAILY
Status: DISCONTINUED | OUTPATIENT
Start: 2018-01-01 | End: 2018-01-01 | Stop reason: HOSPADM

## 2018-01-01 RX ORDER — LISINOPRIL 2.5 MG/1
2.5 TABLET ORAL DAILY
Status: DISCONTINUED | OUTPATIENT
Start: 2018-01-01 | End: 2018-01-01

## 2018-01-01 RX ORDER — WARFARIN 4 MG/1
4 TABLET ORAL DAILY
Status: DISCONTINUED | OUTPATIENT
Start: 2018-01-01 | End: 2018-01-01

## 2018-01-01 RX ORDER — ROCURONIUM BROMIDE 10 MG/ML
INJECTION, SOLUTION INTRAVENOUS
Status: DISCONTINUED
Start: 2018-01-01 | End: 2018-01-01 | Stop reason: HOSPADM

## 2018-01-01 RX ORDER — FUROSEMIDE 40 MG/1
40 TABLET ORAL 2 TIMES DAILY
Status: DISCONTINUED | OUTPATIENT
Start: 2018-01-01 | End: 2018-01-01

## 2018-01-01 RX ORDER — ALPRAZOLAM 0.5 MG/1
0.5 TABLET ORAL ONCE
Status: DISCONTINUED | OUTPATIENT
Start: 2018-01-01 | End: 2018-01-01

## 2018-01-01 RX ORDER — ACETAMINOPHEN 325 MG/1
650 TABLET ORAL ONCE
Status: COMPLETED | OUTPATIENT
Start: 2018-01-01 | End: 2018-01-01

## 2018-01-01 RX ORDER — VANCOMYCIN HCL IN 5 % DEXTROSE 1.5G/250ML
1500 PLASTIC BAG, INJECTION (ML) INTRAVENOUS
Status: DISCONTINUED | OUTPATIENT
Start: 2018-01-01 | End: 2018-01-01 | Stop reason: HOSPADM

## 2018-01-01 RX ORDER — WARFARIN 2.5 MG/1
2.5 TABLET ORAL DAILY
Status: DISCONTINUED | OUTPATIENT
Start: 2018-01-01 | End: 2018-01-01

## 2018-01-01 RX ORDER — POTASSIUM CHLORIDE 750 MG/1
20 CAPSULE, EXTENDED RELEASE ORAL DAILY
Qty: 60 CAPSULE | Refills: 11 | Status: SHIPPED | OUTPATIENT
Start: 2018-01-01 | End: 2018-01-01 | Stop reason: SDUPTHER

## 2018-01-01 RX ORDER — HYDROMORPHONE HYDROCHLORIDE 2 MG/ML
0.2 INJECTION, SOLUTION INTRAMUSCULAR; INTRAVENOUS; SUBCUTANEOUS EVERY 5 MIN PRN
Status: DISCONTINUED | OUTPATIENT
Start: 2018-01-01 | End: 2018-01-01 | Stop reason: HOSPADM

## 2018-01-01 RX ORDER — DRONABINOL 2.5 MG/1
5 CAPSULE ORAL 2 TIMES DAILY
Status: DISCONTINUED | OUTPATIENT
Start: 2018-01-01 | End: 2018-01-01

## 2018-01-01 RX ORDER — POTASSIUM CHLORIDE 20 MEQ/1
40 TABLET, EXTENDED RELEASE ORAL
Status: DISCONTINUED | OUTPATIENT
Start: 2018-01-01 | End: 2018-01-01

## 2018-01-01 RX ORDER — AMIODARONE HYDROCHLORIDE 400 MG/1
400 TABLET ORAL DAILY
Qty: 30 TABLET | Refills: 11 | Status: ON HOLD
Start: 2018-01-01 | End: 2018-01-01 | Stop reason: HOSPADM

## 2018-01-01 RX ORDER — WARFARIN 1 MG/1
1 TABLET ORAL DAILY
Status: DISCONTINUED | OUTPATIENT
Start: 2018-01-01 | End: 2018-01-01

## 2018-01-01 RX ORDER — CALCIUM CHLORIDE INJECTION 100 MG/ML
INJECTION, SOLUTION INTRAVENOUS CODE/TRAUMA/SEDATION MEDICATION
Status: COMPLETED | OUTPATIENT
Start: 2018-01-01 | End: 2018-01-01

## 2018-01-01 RX ORDER — NOREPINEPHRINE BITARTRATE/D5W 4MG/250ML
0.02 PLASTIC BAG, INJECTION (ML) INTRAVENOUS CONTINUOUS
Status: DISCONTINUED | OUTPATIENT
Start: 2018-01-01 | End: 2018-01-01

## 2018-01-01 RX ORDER — CIPROFLOXACIN 500 MG/1
500 TABLET ORAL EVERY 12 HOURS
Status: DISCONTINUED | OUTPATIENT
Start: 2018-01-01 | End: 2018-01-01 | Stop reason: HOSPADM

## 2018-01-01 RX ORDER — POTASSIUM CHLORIDE 20 MEQ/1
60 TABLET, EXTENDED RELEASE ORAL ONCE
Status: COMPLETED | OUTPATIENT
Start: 2018-01-01 | End: 2018-01-01

## 2018-01-01 RX ORDER — POTASSIUM CHLORIDE 20 MEQ/1
20 TABLET, EXTENDED RELEASE ORAL DAILY
Status: DISCONTINUED | OUTPATIENT
Start: 2018-01-01 | End: 2018-01-01

## 2018-01-01 RX ORDER — FUROSEMIDE 40 MG/1
TABLET ORAL
Status: ON HOLD | COMMUNITY
Start: 2018-01-01 | End: 2018-01-01 | Stop reason: HOSPADM

## 2018-01-01 RX ORDER — HYDRALAZINE HYDROCHLORIDE 20 MG/ML
5 INJECTION INTRAMUSCULAR; INTRAVENOUS ONCE
Status: DISCONTINUED | OUTPATIENT
Start: 2018-01-01 | End: 2018-01-01

## 2018-01-01 RX ORDER — BENZONATATE 100 MG/1
100 CAPSULE ORAL 3 TIMES DAILY PRN
Status: ON HOLD | COMMUNITY
End: 2018-01-01 | Stop reason: HOSPADM

## 2018-01-01 RX ORDER — FUROSEMIDE 40 MG/1
40 TABLET ORAL ONCE
Status: COMPLETED | OUTPATIENT
Start: 2018-01-01 | End: 2018-01-01

## 2018-01-01 RX ORDER — MEXILETINE HYDROCHLORIDE 200 MG/1
200 CAPSULE ORAL EVERY 12 HOURS
Status: DISCONTINUED | OUTPATIENT
Start: 2018-01-01 | End: 2018-01-01

## 2018-01-01 RX ORDER — PRAVASTATIN SODIUM 20 MG/1
20 TABLET ORAL DAILY
Status: DISCONTINUED | OUTPATIENT
Start: 2018-01-01 | End: 2018-01-01 | Stop reason: ALTCHOICE

## 2018-01-01 RX ORDER — DRONABINOL 5 MG/1
CAPSULE ORAL
Qty: 90 CAPSULE | Refills: 2 | Status: SHIPPED | OUTPATIENT
Start: 2018-01-01 | End: 2018-01-01 | Stop reason: SDUPTHER

## 2018-01-01 RX ORDER — CEFEPIME HYDROCHLORIDE 1 G/1
1 INJECTION, POWDER, FOR SOLUTION INTRAMUSCULAR; INTRAVENOUS
Status: DISCONTINUED | OUTPATIENT
Start: 2018-01-01 | End: 2018-01-01

## 2018-01-01 RX ORDER — HYDRALAZINE HYDROCHLORIDE 20 MG/ML
15 INJECTION INTRAMUSCULAR; INTRAVENOUS ONCE
Status: COMPLETED | OUTPATIENT
Start: 2018-01-01 | End: 2018-01-01

## 2018-01-01 RX ORDER — KETAMINE HYDROCHLORIDE 100 MG/ML
INJECTION, SOLUTION INTRAMUSCULAR; INTRAVENOUS
Status: DISCONTINUED | OUTPATIENT
Start: 2018-01-01 | End: 2018-01-01

## 2018-01-01 RX ORDER — AMIODARONE HYDROCHLORIDE 400 MG/1
400 TABLET ORAL DAILY
Qty: 30 TABLET | Refills: 11
Start: 2018-01-01 | End: 2018-01-01

## 2018-01-01 RX ORDER — SODIUM CHLORIDE 0.9 % (FLUSH) 0.9 %
3 SYRINGE (ML) INJECTION
Status: DISCONTINUED | OUTPATIENT
Start: 2018-01-01 | End: 2018-01-01

## 2018-01-01 RX ORDER — CYCLOBENZAPRINE HCL 5 MG
5 TABLET ORAL ONCE
Status: COMPLETED | OUTPATIENT
Start: 2018-01-01 | End: 2018-01-01

## 2018-01-01 RX ORDER — WARFARIN 2 MG/1
2 TABLET ORAL DAILY
Qty: 30 TABLET | Refills: 11 | Status: SHIPPED | OUTPATIENT
Start: 2018-01-01 | End: 2018-01-01 | Stop reason: SDUPTHER

## 2018-01-01 RX ORDER — ONDANSETRON 4 MG/1
4 TABLET, ORALLY DISINTEGRATING ORAL EVERY 6 HOURS PRN
Status: ON HOLD | COMMUNITY
Start: 2018-01-01 | End: 2018-01-01 | Stop reason: HOSPADM

## 2018-01-01 RX ORDER — POTASSIUM CHLORIDE 750 MG/1
20 CAPSULE, EXTENDED RELEASE ORAL DAILY
Qty: 60 CAPSULE | Refills: 11 | Status: ON HOLD | OUTPATIENT
Start: 2018-01-01 | End: 2018-01-01 | Stop reason: HOSPADM

## 2018-01-01 RX ORDER — AMLODIPINE BESYLATE 5 MG/1
5 TABLET ORAL DAILY
COMMUNITY
Start: 2018-01-01 | End: 2018-01-01 | Stop reason: SDUPTHER

## 2018-01-01 RX ORDER — WARFARIN SODIUM 5 MG/1
TABLET ORAL
Qty: 32 TABLET | Refills: 5 | Status: ON HOLD | OUTPATIENT
Start: 2018-01-01 | End: 2018-01-01

## 2018-01-01 RX ORDER — FERROUS SULFATE 324(65)MG
325 TABLET, DELAYED RELEASE (ENTERIC COATED) ORAL 3 TIMES DAILY
Qty: 90 TABLET | Refills: 1 | Status: SHIPPED | OUTPATIENT
Start: 2018-01-01

## 2018-01-01 RX ORDER — ONDANSETRON 8 MG/1
8 TABLET, ORALLY DISINTEGRATING ORAL EVERY 6 HOURS PRN
Status: DISCONTINUED | OUTPATIENT
Start: 2018-01-01 | End: 2018-01-01

## 2018-01-01 RX ORDER — AMLODIPINE BESYLATE 5 MG/1
5 TABLET ORAL DAILY
Qty: 90 TABLET | Refills: 3 | Status: ON HOLD | OUTPATIENT
Start: 2018-01-01 | End: 2018-01-01

## 2018-01-01 RX ORDER — NOREPINEPHRINE BITARTRATE/D5W 4MG/250ML
0.02 PLASTIC BAG, INJECTION (ML) INTRAVENOUS CONTINUOUS
Status: CANCELLED | OUTPATIENT
Start: 2018-01-01

## 2018-01-01 RX ORDER — PANTOPRAZOLE SODIUM 40 MG/10ML
40 INJECTION, POWDER, LYOPHILIZED, FOR SOLUTION INTRAVENOUS 2 TIMES DAILY
Status: COMPLETED | OUTPATIENT
Start: 2018-01-01 | End: 2018-01-01

## 2018-01-01 RX ORDER — CEFAZOLIN SODIUM 1 G/3ML
INJECTION, POWDER, FOR SOLUTION INTRAMUSCULAR; INTRAVENOUS
Status: DISCONTINUED | OUTPATIENT
Start: 2018-01-01 | End: 2018-01-01

## 2018-01-01 RX ORDER — FENTANYL CITRATE 50 UG/ML
50 INJECTION, SOLUTION INTRAMUSCULAR; INTRAVENOUS
Status: DISCONTINUED | OUTPATIENT
Start: 2018-01-01 | End: 2018-01-01

## 2018-01-01 RX ORDER — DEXTROSE 50 % IN WATER (D50W) INTRAVENOUS SYRINGE
Status: DISCONTINUED
Start: 2018-01-01 | End: 2018-01-01 | Stop reason: HOSPADM

## 2018-01-01 RX ORDER — CIPROFLOXACIN 500 MG/1
500 TABLET ORAL 2 TIMES DAILY
Status: DISCONTINUED | OUTPATIENT
Start: 2018-01-01 | End: 2018-01-01

## 2018-01-01 RX ORDER — SUCCINYLCHOLINE CHLORIDE 20 MG/ML
INJECTION INTRAMUSCULAR; INTRAVENOUS
Status: DISCONTINUED
Start: 2018-01-01 | End: 2018-01-01 | Stop reason: HOSPADM

## 2018-01-01 RX ORDER — DIPHENHYDRAMINE HCL 25 MG
25 CAPSULE ORAL EVERY 8 HOURS PRN
Status: DISCONTINUED | OUTPATIENT
Start: 2018-01-01 | End: 2018-01-01 | Stop reason: HOSPADM

## 2018-01-01 RX ORDER — LISINOPRIL 2.5 MG/1
2.5 TABLET ORAL ONCE
Status: DISCONTINUED | OUTPATIENT
Start: 2018-01-01 | End: 2018-01-01

## 2018-01-01 RX ORDER — WARFARIN SODIUM 5 MG/1
TABLET ORAL
Qty: 32 TABLET | Refills: 5
Start: 2018-01-01 | End: 2018-01-01 | Stop reason: SDUPTHER

## 2018-01-01 RX ORDER — MUPIROCIN 20 MG/G
OINTMENT TOPICAL DAILY
Status: DISCONTINUED | OUTPATIENT
Start: 2018-01-01 | End: 2018-01-01 | Stop reason: HOSPADM

## 2018-01-01 RX ORDER — AMIODARONE HYDROCHLORIDE 200 MG/1
400 TABLET ORAL DAILY
Qty: 60 TABLET | Refills: 11 | Status: SHIPPED | OUTPATIENT
Start: 2018-01-01 | End: 2019-10-17

## 2018-01-01 RX ORDER — SPIRONOLACTONE 25 MG/1
25 TABLET ORAL DAILY
Qty: 30 TABLET | Refills: 5 | Status: SHIPPED | OUTPATIENT
Start: 2018-01-01

## 2018-01-01 RX ORDER — LANOLIN ALCOHOL/MO/W.PET/CERES
400 CREAM (GRAM) TOPICAL 2 TIMES DAILY
Qty: 180 TABLET | Refills: 3 | Status: ON HOLD | OUTPATIENT
Start: 2018-01-01 | End: 2018-01-01

## 2018-01-01 RX ORDER — LANOLIN ALCOHOL/MO/W.PET/CERES
800 CREAM (GRAM) TOPICAL ONCE
Status: COMPLETED | OUTPATIENT
Start: 2018-01-01 | End: 2018-01-01

## 2018-01-01 RX ORDER — DIPHENHYDRAMINE HCL 25 MG
25 CAPSULE ORAL EVERY 6 HOURS PRN
Status: DISCONTINUED | OUTPATIENT
Start: 2018-01-01 | End: 2018-01-01 | Stop reason: HOSPADM

## 2018-01-01 RX ORDER — PANTOPRAZOLE SODIUM 40 MG/10ML
40 INJECTION, POWDER, LYOPHILIZED, FOR SOLUTION INTRAVENOUS
Status: COMPLETED | OUTPATIENT
Start: 2018-01-01 | End: 2018-01-01

## 2018-01-01 RX ORDER — BENZONATATE 100 MG/1
100 CAPSULE ORAL 3 TIMES DAILY PRN
Qty: 30 CAPSULE | Refills: 0 | Status: SHIPPED | OUTPATIENT
Start: 2018-01-01 | End: 2018-01-01

## 2018-01-01 RX ORDER — ETOMIDATE 2 MG/ML
INJECTION INTRAVENOUS
Status: DISCONTINUED
Start: 2018-01-01 | End: 2018-01-01 | Stop reason: HOSPADM

## 2018-01-01 RX ORDER — ATROPINE SULFATE 0.1 MG/ML
INJECTION INTRAVENOUS CODE/TRAUMA/SEDATION MEDICATION
Status: COMPLETED | OUTPATIENT
Start: 2018-01-01 | End: 2018-01-01

## 2018-01-01 RX ORDER — LANOLIN ALCOHOL/MO/W.PET/CERES
400 CREAM (GRAM) TOPICAL DAILY
Qty: 180 TABLET | Refills: 3 | Status: ON HOLD | OUTPATIENT
Start: 2018-01-01 | End: 2018-01-01 | Stop reason: HOSPADM

## 2018-01-01 RX ORDER — HEPARIN SODIUM 10000 [USP'U]/100ML
400 INJECTION, SOLUTION INTRAVENOUS CONTINUOUS
Status: DISCONTINUED | OUTPATIENT
Start: 2018-01-01 | End: 2018-01-01

## 2018-01-01 RX ORDER — AMIODARONE HYDROCHLORIDE 200 MG/1
400 TABLET ORAL 2 TIMES DAILY
Status: DISCONTINUED | OUTPATIENT
Start: 2018-01-01 | End: 2018-01-01 | Stop reason: HOSPADM

## 2018-01-01 RX ORDER — PANTOPRAZOLE SODIUM 40 MG/1
40 TABLET, DELAYED RELEASE ORAL DAILY
Qty: 30 TABLET | Refills: 11 | Status: ON HOLD | OUTPATIENT
Start: 2018-01-01 | End: 2018-01-01 | Stop reason: SDUPTHER

## 2018-01-01 RX ORDER — WARFARIN 1 MG/1
1 TABLET ORAL ONCE
Status: COMPLETED | OUTPATIENT
Start: 2018-01-01 | End: 2018-01-01

## 2018-01-01 RX ORDER — SODIUM CHLORIDE 9 MG/ML
INJECTION, SOLUTION INTRAVENOUS CONTINUOUS
Status: DISCONTINUED | OUTPATIENT
Start: 2018-01-01 | End: 2018-01-01 | Stop reason: HOSPADM

## 2018-01-01 RX ORDER — WARFARIN 2 MG/1
TABLET ORAL
Qty: 30 TABLET | Refills: 11 | Status: SHIPPED | OUTPATIENT
Start: 2018-01-01

## 2018-01-01 RX ORDER — LISINOPRIL 5 MG/1
5 TABLET ORAL ONCE
Status: COMPLETED | OUTPATIENT
Start: 2018-01-01 | End: 2018-01-01

## 2018-01-01 RX ORDER — LANOLIN ALCOHOL/MO/W.PET/CERES
400 CREAM (GRAM) TOPICAL 2 TIMES DAILY
Status: DISCONTINUED | OUTPATIENT
Start: 2018-01-01 | End: 2018-01-01

## 2018-01-01 RX ORDER — ACETAMINOPHEN 10 MG/ML
INJECTION, SOLUTION INTRAVENOUS
Status: DISCONTINUED | OUTPATIENT
Start: 2018-01-01 | End: 2018-01-01

## 2018-01-01 RX ORDER — POTASSIUM CHLORIDE 20 MEQ/1
80 TABLET, EXTENDED RELEASE ORAL ONCE
Status: DISCONTINUED | OUTPATIENT
Start: 2018-01-01 | End: 2018-01-01

## 2018-01-01 RX ORDER — AMIODARONE HYDROCHLORIDE 400 MG/1
TABLET ORAL
Qty: 45 TABLET | Refills: 11 | Status: ON HOLD | OUTPATIENT
Start: 2018-01-01 | End: 2018-01-01

## 2018-01-01 RX ORDER — FENTANYL CITRATE 50 UG/ML
INJECTION, SOLUTION INTRAMUSCULAR; INTRAVENOUS
Status: DISCONTINUED | OUTPATIENT
Start: 2018-01-01 | End: 2018-01-01

## 2018-01-01 RX ORDER — POTASSIUM CHLORIDE 29.8 MG/ML
40 INJECTION INTRAVENOUS ONCE
Status: DISCONTINUED | OUTPATIENT
Start: 2018-01-01 | End: 2018-01-01

## 2018-01-01 RX ORDER — LANOLIN ALCOHOL/MO/W.PET/CERES
400 CREAM (GRAM) TOPICAL DAILY
Status: DISCONTINUED | OUTPATIENT
Start: 2018-01-01 | End: 2018-01-01 | Stop reason: HOSPADM

## 2018-01-01 RX ORDER — SODIUM CHLORIDE 9 MG/ML
INJECTION, SOLUTION INTRAVENOUS CONTINUOUS
Status: ACTIVE | OUTPATIENT
Start: 2018-01-01 | End: 2018-01-01

## 2018-01-01 RX ORDER — POTASSIUM CHLORIDE 20 MEQ/1
TABLET, EXTENDED RELEASE ORAL
Status: DISPENSED
Start: 2018-01-01 | End: 2018-01-01

## 2018-01-01 RX ORDER — LIDOCAINE HYDROCHLORIDE 10 MG/ML
INJECTION, SOLUTION EPIDURAL; INFILTRATION; INTRACAUDAL; PERINEURAL
Status: COMPLETED
Start: 2018-01-01 | End: 2018-01-01

## 2018-01-01 RX ORDER — LISINOPRIL 5 MG/1
5 TABLET ORAL DAILY
Qty: 90 TABLET | Refills: 3 | Status: ON HOLD | OUTPATIENT
Start: 2018-01-01 | End: 2018-01-01 | Stop reason: HOSPADM

## 2018-01-01 RX ORDER — SODIUM CHLORIDE 9 MG/ML
INJECTION, SOLUTION INTRAVENOUS ONCE
Status: COMPLETED | OUTPATIENT
Start: 2018-01-01 | End: 2018-01-01

## 2018-01-01 RX ORDER — SODIUM CHLORIDE 0.9 % (FLUSH) 0.9 %
10 SYRINGE (ML) INJECTION
Status: DISCONTINUED | OUTPATIENT
Start: 2018-01-01 | End: 2018-01-01

## 2018-01-01 RX ORDER — LISINOPRIL 2.5 MG/1
2.5 TABLET ORAL 2 TIMES DAILY
Status: DISCONTINUED | OUTPATIENT
Start: 2018-01-01 | End: 2018-01-01

## 2018-01-01 RX ORDER — EPINEPHRINE 1 MG/ML
INJECTION, SOLUTION INTRACARDIAC; INTRAMUSCULAR; INTRAVENOUS; SUBCUTANEOUS
Status: DISCONTINUED
Start: 2018-01-01 | End: 2018-01-01 | Stop reason: WASHOUT

## 2018-01-01 RX ORDER — FERROUS SULFATE 325(65) MG
325 TABLET, DELAYED RELEASE (ENTERIC COATED) ORAL 3 TIMES DAILY
Refills: 0 | Status: ON HOLD | COMMUNITY
Start: 2018-01-01 | End: 2018-01-01 | Stop reason: SDUPTHER

## 2018-01-01 RX ORDER — DRONABINOL 5 MG/1
CAPSULE ORAL
Qty: 90 CAPSULE | Refills: 2 | Status: SHIPPED | OUTPATIENT
Start: 2018-01-01

## 2018-01-01 RX ORDER — HYDROCODONE BITARTRATE AND ACETAMINOPHEN 5; 325 MG/1; MG/1
1 TABLET ORAL EVERY 4 HOURS PRN
Status: DISCONTINUED | OUTPATIENT
Start: 2018-01-01 | End: 2018-01-01 | Stop reason: HOSPADM

## 2018-01-01 RX ORDER — HEPARIN SODIUM,PORCINE/D5W 25000/250
18 INTRAVENOUS SOLUTION INTRAVENOUS CONTINUOUS
Status: DISCONTINUED | OUTPATIENT
Start: 2018-01-01 | End: 2018-01-01

## 2018-01-01 RX ADMIN — AMIODARONE HYDROCHLORIDE 0.5 MG/MIN: 1.8 INJECTION, SOLUTION INTRAVENOUS at 06:11

## 2018-01-01 RX ADMIN — FUROSEMIDE 40 MG: 40 TABLET ORAL at 05:01

## 2018-01-01 RX ADMIN — FERROUS SULFATE TAB EC 325 MG (65 MG FE EQUIVALENT) 325 MG: 325 (65 FE) TABLET DELAYED RESPONSE at 04:11

## 2018-01-01 RX ADMIN — Medication 30 MG: at 03:03

## 2018-01-01 RX ADMIN — AMLODIPINE BESYLATE 5 MG: 5 TABLET ORAL at 08:03

## 2018-01-01 RX ADMIN — EPINEPHRINE 1 MG: 0.1 INJECTION, SOLUTION ENDOTRACHEAL; INTRACARDIAC; INTRAVENOUS at 11:12

## 2018-01-01 RX ADMIN — Medication 400 MG: at 09:06

## 2018-01-01 RX ADMIN — WARFARIN SODIUM 1 MG: 1 TABLET ORAL at 04:10

## 2018-01-01 RX ADMIN — MIRTAZAPINE 7.5 MG: 7.5 TABLET ORAL at 09:10

## 2018-01-01 RX ADMIN — DRONABINOL 5 MG: 2.5 CAPSULE ORAL at 09:10

## 2018-01-01 RX ADMIN — AMIODARONE HYDROCHLORIDE 200 MG: 200 TABLET ORAL at 09:10

## 2018-01-01 RX ADMIN — DRONABINOL 5 MG: 2.5 CAPSULE ORAL at 02:03

## 2018-01-01 RX ADMIN — PANTOPRAZOLE SODIUM 40 MG: 40 TABLET, DELAYED RELEASE ORAL at 09:10

## 2018-01-01 RX ADMIN — AMIODARONE HYDROCHLORIDE 400 MG: 200 TABLET ORAL at 05:08

## 2018-01-01 RX ADMIN — HYDROCODONE BITARTRATE AND ACETAMINOPHEN 1 TABLET: 5; 325 TABLET ORAL at 09:03

## 2018-01-01 RX ADMIN — DRONABINOL 5 MG: 2.5 CAPSULE ORAL at 05:10

## 2018-01-01 RX ADMIN — LISINOPRIL 2.5 MG: 2.5 TABLET ORAL at 09:10

## 2018-01-01 RX ADMIN — FENTANYL CITRATE 100 MCG: 50 INJECTION, SOLUTION INTRAMUSCULAR; INTRAVENOUS at 02:10

## 2018-01-01 RX ADMIN — VANCOMYCIN HYDROCHLORIDE 750 MG: 750 INJECTION, POWDER, LYOPHILIZED, FOR SOLUTION INTRAVENOUS at 10:01

## 2018-01-01 RX ADMIN — PANTOPRAZOLE SODIUM 40 MG: 40 INJECTION, POWDER, FOR SOLUTION INTRAVENOUS at 08:10

## 2018-01-01 RX ADMIN — VANCOMYCIN HYDROCHLORIDE 1000 MG: 1 INJECTION, POWDER, LYOPHILIZED, FOR SOLUTION INTRAVENOUS at 01:01

## 2018-01-01 RX ADMIN — PANTOPRAZOLE SODIUM 160 MG/HR: 40 INJECTION, POWDER, FOR SOLUTION INTRAVENOUS at 09:03

## 2018-01-01 RX ADMIN — Medication 400 MG: at 08:10

## 2018-01-01 RX ADMIN — SODIUM CHLORIDE: 0.9 INJECTION, SOLUTION INTRAVENOUS at 01:10

## 2018-01-01 RX ADMIN — AMIODARONE HYDROCHLORIDE 400 MG: 200 TABLET ORAL at 09:12

## 2018-01-01 RX ADMIN — FUROSEMIDE 20 MG: 20 TABLET ORAL at 06:03

## 2018-01-01 RX ADMIN — Medication 10 ML: at 06:03

## 2018-01-01 RX ADMIN — WARFARIN SODIUM 6 MG: 3 TABLET ORAL at 04:01

## 2018-01-01 RX ADMIN — SODIUM CHLORIDE 250 MG: 9 INJECTION, SOLUTION INTRAVENOUS at 08:06

## 2018-01-01 RX ADMIN — DRONABINOL 5 MG: 2.5 CAPSULE ORAL at 05:01

## 2018-01-01 RX ADMIN — Medication 3 ML: at 07:11

## 2018-01-01 RX ADMIN — ETOMIDATE 20 MG: 2 INJECTION, SOLUTION INTRAVENOUS at 12:03

## 2018-01-01 RX ADMIN — PROPOFOL 20 MG: 10 INJECTION, EMULSION INTRAVENOUS at 08:01

## 2018-01-01 RX ADMIN — WARFARIN SODIUM 1 MG: 1 TABLET ORAL at 05:03

## 2018-01-01 RX ADMIN — DRONABINOL 5 MG: 2.5 CAPSULE ORAL at 05:03

## 2018-01-01 RX ADMIN — FUROSEMIDE 20 MG: 20 TABLET ORAL at 08:08

## 2018-01-01 RX ADMIN — WARFARIN SODIUM 1 MG: 1 TABLET ORAL at 06:03

## 2018-01-01 RX ADMIN — Medication 400 MG: at 09:10

## 2018-01-01 RX ADMIN — MAGNESIUM OXIDE TAB 400 MG (241.3 MG ELEMENTAL MG) 400 MG: 400 (241.3 MG) TAB at 09:03

## 2018-01-01 RX ADMIN — Medication 3 ML: at 01:01

## 2018-01-01 RX ADMIN — FERROUS SULFATE TAB EC 325 MG (65 MG FE EQUIVALENT) 325 MG: 325 (65 FE) TABLET DELAYED RESPONSE at 02:12

## 2018-01-01 RX ADMIN — DIPHENHYDRAMINE HYDROCHLORIDE 25 MG: 25 CAPSULE ORAL at 05:08

## 2018-01-01 RX ADMIN — WARFARIN SODIUM 2 MG: 2 TABLET ORAL at 04:10

## 2018-01-01 RX ADMIN — HEPARIN SODIUM 18 UNITS/KG/HR: 10000 INJECTION, SOLUTION INTRAVENOUS at 05:03

## 2018-01-01 RX ADMIN — AMIODARONE HYDROCHLORIDE 400 MG: 200 TABLET ORAL at 08:10

## 2018-01-01 RX ADMIN — FUROSEMIDE 20 MG: 10 INJECTION, SOLUTION INTRAMUSCULAR; INTRAVENOUS at 11:11

## 2018-01-01 RX ADMIN — HEPARIN SODIUM 17 UNITS/KG/HR: 10000 INJECTION, SOLUTION INTRAVENOUS at 08:03

## 2018-01-01 RX ADMIN — PRAVASTATIN SODIUM 20 MG: 20 TABLET ORAL at 09:10

## 2018-01-01 RX ADMIN — PANTOPRAZOLE SODIUM 40 MG: 40 TABLET, DELAYED RELEASE ORAL at 09:08

## 2018-01-01 RX ADMIN — FUROSEMIDE 20 MG: 20 TABLET ORAL at 08:03

## 2018-01-01 RX ADMIN — POTASSIUM CHLORIDE 20 MEQ: 750 CAPSULE, EXTENDED RELEASE ORAL at 08:03

## 2018-01-01 RX ADMIN — FUROSEMIDE 20 MG: 20 TABLET ORAL at 09:06

## 2018-01-01 RX ADMIN — CEFEPIME 1 G: 1 INJECTION, POWDER, FOR SOLUTION INTRAMUSCULAR; INTRAVENOUS at 04:11

## 2018-01-01 RX ADMIN — VASOPRESSIN 3 UNITS: 20 INJECTION INTRAVENOUS at 09:01

## 2018-01-01 RX ADMIN — MUPIROCIN: 20 OINTMENT TOPICAL at 09:12

## 2018-01-01 RX ADMIN — SODIUM BICARBONATE 50 MEQ: 84 INJECTION, SOLUTION INTRAVENOUS at 11:12

## 2018-01-01 RX ADMIN — PANTOPRAZOLE SODIUM 40 MG: 40 TABLET, DELAYED RELEASE ORAL at 08:11

## 2018-01-01 RX ADMIN — HYDROCODONE BITARTRATE AND ACETAMINOPHEN 1 TABLET: 5; 325 TABLET ORAL at 03:03

## 2018-01-01 RX ADMIN — AMIODARONE HYDROCHLORIDE 400 MG: 200 TABLET ORAL at 08:11

## 2018-01-01 RX ADMIN — ATROPINE SULFATE 1 MG: 0.1 INJECTION PARENTERAL at 11:12

## 2018-01-01 RX ADMIN — Medication 400 MG: at 08:09

## 2018-01-01 RX ADMIN — DRONABINOL 5 MG: 2.5 CAPSULE ORAL at 04:03

## 2018-01-01 RX ADMIN — FUROSEMIDE 40 MG: 40 TABLET ORAL at 08:01

## 2018-01-01 RX ADMIN — WARFARIN SODIUM 7.5 MG: 7.5 TABLET ORAL at 04:01

## 2018-01-01 RX ADMIN — DRONABINOL 5 MG: 2.5 CAPSULE ORAL at 09:03

## 2018-01-01 RX ADMIN — MIDAZOLAM HYDROCHLORIDE 1 MG: 1 INJECTION, SOLUTION INTRAMUSCULAR; INTRAVENOUS at 01:01

## 2018-01-01 RX ADMIN — SPIRONOLACTONE 25 MG: 25 TABLET, FILM COATED ORAL at 09:09

## 2018-01-01 RX ADMIN — LISINOPRIL 5 MG: 5 TABLET ORAL at 09:03

## 2018-01-01 RX ADMIN — DRONABINOL 5 MG: 2.5 CAPSULE ORAL at 04:10

## 2018-01-01 RX ADMIN — FERROUS SULFATE TAB EC 325 MG (65 MG FE EQUIVALENT) 325 MG: 325 (65 FE) TABLET DELAYED RESPONSE at 03:10

## 2018-01-01 RX ADMIN — POTASSIUM CHLORIDE 20 MEQ: 750 CAPSULE, EXTENDED RELEASE ORAL at 09:03

## 2018-01-01 RX ADMIN — LIDOCAINE HYDROCHLORIDE 50 MG: 10 INJECTION, SOLUTION EPIDURAL; INFILTRATION; INTRACAUDAL; PERINEURAL at 11:12

## 2018-01-01 RX ADMIN — LISINOPRIL 5 MG: 5 TABLET ORAL at 08:01

## 2018-01-01 RX ADMIN — MAGNESIUM OXIDE TAB 400 MG (241.3 MG ELEMENTAL MG) 400 MG: 400 (241.3 MG) TAB at 08:03

## 2018-01-01 RX ADMIN — HEPARIN SODIUM 17 UNITS/KG/HR: 10000 INJECTION, SOLUTION INTRAVENOUS at 12:01

## 2018-01-01 RX ADMIN — POTASSIUM CHLORIDE 30 MEQ: 750 CAPSULE, EXTENDED RELEASE ORAL at 06:10

## 2018-01-01 RX ADMIN — PANTOPRAZOLE SODIUM 40 MG: 40 TABLET, DELAYED RELEASE ORAL at 08:08

## 2018-01-01 RX ADMIN — Medication 3 ML: at 06:10

## 2018-01-01 RX ADMIN — EPINEPHRINE 5 MCG: 1 INJECTION, SOLUTION INTRAMUSCULAR; SUBCUTANEOUS at 09:01

## 2018-01-01 RX ADMIN — DRONABINOL 5 MG: 2.5 CAPSULE ORAL at 08:10

## 2018-01-01 RX ADMIN — FUROSEMIDE 20 MG: 20 TABLET ORAL at 10:03

## 2018-01-01 RX ADMIN — ACETAMINOPHEN 1000 MG: 10 INJECTION, SOLUTION INTRAVENOUS at 01:03

## 2018-01-01 RX ADMIN — WARFARIN SODIUM 5 MG: 5 TABLET ORAL at 05:06

## 2018-01-01 RX ADMIN — HEPARIN SODIUM 18 UNITS/KG/HR: 10000 INJECTION, SOLUTION INTRAVENOUS at 09:03

## 2018-01-01 RX ADMIN — PRAVASTATIN SODIUM 20 MG: 20 TABLET ORAL at 09:01

## 2018-01-01 RX ADMIN — FERROUS SULFATE TAB EC 325 MG (65 MG FE EQUIVALENT) 325 MG: 325 (65 FE) TABLET DELAYED RESPONSE at 09:11

## 2018-01-01 RX ADMIN — PRAVASTATIN SODIUM 20 MG: 20 TABLET ORAL at 09:03

## 2018-01-01 RX ADMIN — DRONABINOL 5 MG: 2.5 CAPSULE ORAL at 05:08

## 2018-01-01 RX ADMIN — FENTANYL CITRATE 50 MCG: 50 INJECTION, SOLUTION INTRAMUSCULAR; INTRAVENOUS at 09:03

## 2018-01-01 RX ADMIN — VANCOMYCIN HYDROCHLORIDE 750 MG: 750 INJECTION, POWDER, LYOPHILIZED, FOR SOLUTION INTRAVENOUS at 06:11

## 2018-01-01 RX ADMIN — PANTOPRAZOLE SODIUM 40 MG: 40 TABLET, DELAYED RELEASE ORAL at 05:03

## 2018-01-01 RX ADMIN — DRONABINOL 5 MG: 2.5 CAPSULE ORAL at 11:03

## 2018-01-01 RX ADMIN — PRAVASTATIN SODIUM 20 MG: 20 TABLET ORAL at 09:08

## 2018-01-01 RX ADMIN — AMLODIPINE BESYLATE 5 MG: 5 TABLET ORAL at 09:03

## 2018-01-01 RX ADMIN — LIDOCAINE HYDROCHLORIDE 100 MG: 20 INJECTION, SOLUTION INTRAVENOUS at 02:10

## 2018-01-01 RX ADMIN — SODIUM CHLORIDE: 0.9 INJECTION, SOLUTION INTRAVENOUS at 05:03

## 2018-01-01 RX ADMIN — VANCOMYCIN HYDROCHLORIDE 1000 MG: 1 INJECTION, POWDER, LYOPHILIZED, FOR SOLUTION INTRAVENOUS at 02:01

## 2018-01-01 RX ADMIN — SODIUM CHLORIDE: 0.9 INJECTION, SOLUTION INTRAVENOUS at 12:03

## 2018-01-01 RX ADMIN — WARFARIN SODIUM 2 MG: 1 TABLET ORAL at 04:10

## 2018-01-01 RX ADMIN — FERROUS SULFATE TAB EC 325 MG (65 MG FE EQUIVALENT) 325 MG: 325 (65 FE) TABLET DELAYED RESPONSE at 03:11

## 2018-01-01 RX ADMIN — DRONABINOL 5 MG: 2.5 CAPSULE ORAL at 06:01

## 2018-01-01 RX ADMIN — FUROSEMIDE 40 MG: 40 TABLET ORAL at 10:01

## 2018-01-01 RX ADMIN — CEFTAROLINE FOSAMIL 300 MG: 400 POWDER, FOR SOLUTION INTRAVENOUS at 07:12

## 2018-01-01 RX ADMIN — WARFARIN SODIUM 5 MG: 5 TABLET ORAL at 04:01

## 2018-01-01 RX ADMIN — VANCOMYCIN HYDROCHLORIDE 750 MG: 750 INJECTION, POWDER, LYOPHILIZED, FOR SOLUTION INTRAVENOUS at 09:01

## 2018-01-01 RX ADMIN — TRAMADOL HYDROCHLORIDE 50 MG: 50 TABLET, FILM COATED ORAL at 02:12

## 2018-01-01 RX ADMIN — DEXTROSE 40 MG: 50 INJECTION, SOLUTION INTRAVENOUS at 08:06

## 2018-01-01 RX ADMIN — HYDROCODONE BITARTRATE AND ACETAMINOPHEN 1 TABLET: 5; 325 TABLET ORAL at 02:03

## 2018-01-01 RX ADMIN — ETOMIDATE 2 MG: 2 INJECTION, SOLUTION INTRAVENOUS at 09:03

## 2018-01-01 RX ADMIN — HEPARIN SODIUM 19 UNITS/KG/HR: 10000 INJECTION, SOLUTION INTRAVENOUS at 07:01

## 2018-01-01 RX ADMIN — PRAVASTATIN SODIUM 20 MG: 20 TABLET ORAL at 08:03

## 2018-01-01 RX ADMIN — DRONABINOL 5 MG: 2.5 CAPSULE ORAL at 08:11

## 2018-01-01 RX ADMIN — HYDROCODONE BITARTRATE AND ACETAMINOPHEN 1 TABLET: 5; 325 TABLET ORAL at 08:03

## 2018-01-01 RX ADMIN — FENTANYL CITRATE 50 MCG: 50 INJECTION, SOLUTION INTRAMUSCULAR; INTRAVENOUS at 02:03

## 2018-01-01 RX ADMIN — FERROUS SULFATE TAB EC 325 MG (65 MG FE EQUIVALENT) 325 MG: 325 (65 FE) TABLET DELAYED RESPONSE at 03:12

## 2018-01-01 RX ADMIN — DIPHENHYDRAMINE HYDROCHLORIDE 25 MG: 25 CAPSULE ORAL at 10:08

## 2018-01-01 RX ADMIN — ACETAMINOPHEN 500 MG: 500 TABLET, FILM COATED ORAL at 08:08

## 2018-01-01 RX ADMIN — WARFARIN SODIUM 5 MG: 5 TABLET ORAL at 05:03

## 2018-01-01 RX ADMIN — POTASSIUM CHLORIDE 20 MEQ: 1500 TABLET, EXTENDED RELEASE ORAL at 06:11

## 2018-01-01 RX ADMIN — PROPOFOL 100 MCG/KG/MIN: 10 INJECTION, EMULSION INTRAVENOUS at 02:10

## 2018-01-01 RX ADMIN — DAPTOMYCIN 755 MG: 500 INJECTION, POWDER, LYOPHILIZED, FOR SOLUTION INTRAVENOUS at 09:12

## 2018-01-01 RX ADMIN — Medication 10 ML: at 12:03

## 2018-01-01 RX ADMIN — FUROSEMIDE 40 MG: 40 TABLET ORAL at 09:01

## 2018-01-01 RX ADMIN — PRAVASTATIN SODIUM 20 MG: 20 TABLET ORAL at 08:10

## 2018-01-01 RX ADMIN — ACETAMINOPHEN 650 MG: 325 TABLET ORAL at 06:08

## 2018-01-01 RX ADMIN — FUROSEMIDE 60 MG: 10 INJECTION, SOLUTION INTRAMUSCULAR; INTRAVENOUS at 09:09

## 2018-01-01 RX ADMIN — BACLOFEN 10 MG: 10 TABLET ORAL at 01:03

## 2018-01-01 RX ADMIN — POLYETHYLENE GLYCOL 3350 17 G: 17 POWDER, FOR SOLUTION ORAL at 09:10

## 2018-01-01 RX ADMIN — POLYETHYLENE GLYCOL 3350 17 G: 17 POWDER, FOR SOLUTION ORAL at 08:10

## 2018-01-01 RX ADMIN — PANTOPRAZOLE SODIUM 40 MG: 40 TABLET, DELAYED RELEASE ORAL at 09:11

## 2018-01-01 RX ADMIN — DRONABINOL 5 MG: 2.5 CAPSULE ORAL at 11:01

## 2018-01-01 RX ADMIN — AMIODARONE HYDROCHLORIDE 200 MG: 200 TABLET ORAL at 08:10

## 2018-01-01 RX ADMIN — MAGNESIUM OXIDE TAB 400 MG (241.3 MG ELEMENTAL MG) 400 MG: 400 (241.3 MG) TAB at 10:01

## 2018-01-01 RX ADMIN — SODIUM CHLORIDE 250 ML: 0.9 INJECTION, SOLUTION INTRAVENOUS at 12:10

## 2018-01-01 RX ADMIN — CEFAZOLIN 2 G: 330 INJECTION, POWDER, FOR SOLUTION INTRAMUSCULAR; INTRAVENOUS at 05:03

## 2018-01-01 RX ADMIN — FUROSEMIDE 20 MG: 20 TABLET ORAL at 09:10

## 2018-01-01 RX ADMIN — PRAVASTATIN SODIUM 20 MG: 20 TABLET ORAL at 09:09

## 2018-01-01 RX ADMIN — MAGNESIUM OXIDE TAB 400 MG (241.3 MG ELEMENTAL MG) 400 MG: 400 (241.3 MG) TAB at 08:01

## 2018-01-01 RX ADMIN — HYDROCODONE BITARTRATE AND ACETAMINOPHEN 1 TABLET: 5; 325 TABLET ORAL at 06:03

## 2018-01-01 RX ADMIN — LIDOCAINE HYDROCHLORIDE 80 MG: 20 INJECTION, SOLUTION INTRAVENOUS at 12:03

## 2018-01-01 RX ADMIN — Medication 10 ML: at 09:03

## 2018-01-01 RX ADMIN — Medication 3 ML: at 02:01

## 2018-01-01 RX ADMIN — VANCOMYCIN HYDROCHLORIDE 750 MG: 750 INJECTION, POWDER, LYOPHILIZED, FOR SOLUTION INTRAVENOUS at 11:01

## 2018-01-01 RX ADMIN — VANCOMYCIN HYDROCHLORIDE 750 MG: 750 INJECTION, POWDER, LYOPHILIZED, FOR SOLUTION INTRAVENOUS at 04:11

## 2018-01-01 RX ADMIN — SODIUM CHLORIDE, SODIUM GLUCONATE, SODIUM ACETATE, POTASSIUM CHLORIDE, MAGNESIUM CHLORIDE, SODIUM PHOSPHATE, DIBASIC, AND POTASSIUM PHOSPHATE: .53; .5; .37; .037; .03; .012; .00082 INJECTION, SOLUTION INTRAVENOUS at 08:01

## 2018-01-01 RX ADMIN — HEPARIN SODIUM 17 UNITS/KG/HR: 10000 INJECTION, SOLUTION INTRAVENOUS at 04:01

## 2018-01-01 RX ADMIN — Medication 3 ML: at 05:01

## 2018-01-01 RX ADMIN — DRONABINOL 5 MG: 2.5 CAPSULE ORAL at 11:06

## 2018-01-01 RX ADMIN — DRONABINOL 5 MG: 2.5 CAPSULE ORAL at 09:11

## 2018-01-01 RX ADMIN — HEPARIN SODIUM 15 UNITS/KG/HR: 10000 INJECTION, SOLUTION INTRAVENOUS at 02:03

## 2018-01-01 RX ADMIN — DRONABINOL 5 MG: 2.5 CAPSULE ORAL at 10:08

## 2018-01-01 RX ADMIN — DRONABINOL 5 MG: 2.5 CAPSULE ORAL at 04:01

## 2018-01-01 RX ADMIN — FENTANYL CITRATE 25 MCG: 50 INJECTION, SOLUTION INTRAMUSCULAR; INTRAVENOUS at 01:03

## 2018-01-01 RX ADMIN — FUROSEMIDE 40 MG: 10 INJECTION, SOLUTION INTRAMUSCULAR; INTRAVENOUS at 10:11

## 2018-01-01 RX ADMIN — PANTOPRAZOLE SODIUM 40 MG: 40 TABLET, DELAYED RELEASE ORAL at 06:03

## 2018-01-01 RX ADMIN — MIRTAZAPINE 7.5 MG: 7.5 TABLET ORAL at 03:10

## 2018-01-01 RX ADMIN — Medication 400 MG: at 09:09

## 2018-01-01 RX ADMIN — VANCOMYCIN HYDROCHLORIDE 750 MG: 750 INJECTION, POWDER, LYOPHILIZED, FOR SOLUTION INTRAVENOUS at 05:11

## 2018-01-01 RX ADMIN — PANTOPRAZOLE SODIUM 40 MG: 40 INJECTION, POWDER, FOR SOLUTION INTRAVENOUS at 09:03

## 2018-01-01 RX ADMIN — DRONABINOL 5 MG: 2.5 CAPSULE ORAL at 06:03

## 2018-01-01 RX ADMIN — ACETAMINOPHEN 650 MG: 325 TABLET, FILM COATED ORAL at 08:11

## 2018-01-01 RX ADMIN — MUPIROCIN 1 G: 20 OINTMENT TOPICAL at 08:03

## 2018-01-01 RX ADMIN — MEXILETINE HYDROCHLORIDE 200 MG: 200 CAPSULE ORAL at 09:10

## 2018-01-01 RX ADMIN — SODIUM CHLORIDE, SODIUM GLUCONATE, SODIUM ACETATE, POTASSIUM CHLORIDE, MAGNESIUM CHLORIDE, SODIUM PHOSPHATE, DIBASIC, AND POTASSIUM PHOSPHATE: .53; .5; .37; .037; .03; .012; .00082 INJECTION, SOLUTION INTRAVENOUS at 01:03

## 2018-01-01 RX ADMIN — PANTOPRAZOLE SODIUM 80 MG: 40 INJECTION, POWDER, FOR SOLUTION INTRAVENOUS at 09:01

## 2018-01-01 RX ADMIN — SODIUM CHLORIDE: 0.9 INJECTION, SOLUTION INTRAVENOUS at 03:03

## 2018-01-01 RX ADMIN — FUROSEMIDE 60 MG: 10 INJECTION, SOLUTION INTRAMUSCULAR; INTRAVENOUS at 08:09

## 2018-01-01 RX ADMIN — PANTOPRAZOLE SODIUM 40 MG: 40 INJECTION, POWDER, FOR SOLUTION INTRAVENOUS at 08:03

## 2018-01-01 RX ADMIN — PANTOPRAZOLE SODIUM 40 MG: 40 TABLET, DELAYED RELEASE ORAL at 05:01

## 2018-01-01 RX ADMIN — PANTOPRAZOLE SODIUM 40 MG: 40 TABLET, DELAYED RELEASE ORAL at 08:10

## 2018-01-01 RX ADMIN — WARFARIN SODIUM 7.5 MG: 7.5 TABLET ORAL at 05:03

## 2018-01-01 RX ADMIN — SPIRONOLACTONE 25 MG: 25 TABLET, FILM COATED ORAL at 09:08

## 2018-01-01 RX ADMIN — SODIUM CHLORIDE: 0.9 INJECTION, SOLUTION INTRAVENOUS at 08:01

## 2018-01-01 RX ADMIN — AMIODARONE HYDROCHLORIDE 400 MG: 200 TABLET ORAL at 08:12

## 2018-01-01 RX ADMIN — AMIODARONE HYDROCHLORIDE 400 MG: 200 TABLET ORAL at 09:10

## 2018-01-01 RX ADMIN — PROPOFOL 25 MCG/KG/MIN: 10 INJECTION, EMULSION INTRAVENOUS at 09:03

## 2018-01-01 RX ADMIN — WARFARIN SODIUM 5 MG: 5 TABLET ORAL at 05:01

## 2018-01-01 RX ADMIN — SPIRONOLACTONE 25 MG: 25 TABLET ORAL at 09:10

## 2018-01-01 RX ADMIN — POTASSIUM CHLORIDE 20 MEQ: 1500 TABLET, EXTENDED RELEASE ORAL at 10:08

## 2018-01-01 RX ADMIN — PANTOPRAZOLE SODIUM 40 MG: 40 TABLET, DELAYED RELEASE ORAL at 06:01

## 2018-01-01 RX ADMIN — PRAVASTATIN SODIUM 20 MG: 20 TABLET ORAL at 10:11

## 2018-01-01 RX ADMIN — FENTANYL CITRATE 50 MCG: 50 INJECTION, SOLUTION INTRAMUSCULAR; INTRAVENOUS at 01:03

## 2018-01-01 RX ADMIN — PROPOFOL 80 MCG/KG/MIN: 10 INJECTION, EMULSION INTRAVENOUS at 01:01

## 2018-01-01 RX ADMIN — HYDROCODONE BITARTRATE AND ACETAMINOPHEN 1 TABLET: 5; 325 TABLET ORAL at 11:03

## 2018-01-01 RX ADMIN — FUROSEMIDE 20 MG: 20 TABLET ORAL at 09:03

## 2018-01-01 RX ADMIN — DRONABINOL 5 MG: 2.5 CAPSULE ORAL at 08:12

## 2018-01-01 RX ADMIN — FUROSEMIDE 20 MG: 20 TABLET ORAL at 08:10

## 2018-01-01 RX ADMIN — FUROSEMIDE 40 MG: 40 TABLET ORAL at 04:01

## 2018-01-01 RX ADMIN — DRONABINOL 5 MG: 2.5 CAPSULE ORAL at 06:06

## 2018-01-01 RX ADMIN — HYDRALAZINE HYDROCHLORIDE 25 MG: 25 TABLET ORAL at 07:10

## 2018-01-01 RX ADMIN — DRONABINOL 5 MG: 2.5 CAPSULE ORAL at 09:12

## 2018-01-01 RX ADMIN — HYDRALAZINE HYDROCHLORIDE 10 MG: 20 INJECTION INTRAMUSCULAR; INTRAVENOUS at 02:03

## 2018-01-01 RX ADMIN — Medication 0.18 MCG/KG/MIN: at 01:12

## 2018-01-01 RX ADMIN — ETOMIDATE 6 MG: 2 INJECTION, SOLUTION INTRAVENOUS at 11:08

## 2018-01-01 RX ADMIN — PHENYLEPHRINE HYDROCHLORIDE 200 MCG: 10 INJECTION INTRAVENOUS at 09:01

## 2018-01-01 RX ADMIN — POTASSIUM CHLORIDE 30 MEQ: 750 CAPSULE, EXTENDED RELEASE ORAL at 12:01

## 2018-01-01 RX ADMIN — Medication 10 ML: at 11:03

## 2018-01-01 RX ADMIN — DRONABINOL 5 MG: 2.5 CAPSULE ORAL at 08:03

## 2018-01-01 RX ADMIN — PRAVASTATIN SODIUM 20 MG: 20 TABLET ORAL at 08:01

## 2018-01-01 RX ADMIN — MAGNESIUM OXIDE TAB 400 MG (241.3 MG ELEMENTAL MG) 800 MG: 400 (241.3 MG) TAB at 10:11

## 2018-01-01 RX ADMIN — POTASSIUM CHLORIDE 20 MEQ: 750 CAPSULE, EXTENDED RELEASE ORAL at 08:01

## 2018-01-01 RX ADMIN — HYDROCODONE BITARTRATE AND ACETAMINOPHEN 1 TABLET: 5; 325 TABLET ORAL at 08:10

## 2018-01-01 RX ADMIN — PRAVASTATIN SODIUM 20 MG: 20 TABLET ORAL at 10:01

## 2018-01-01 RX ADMIN — PRAVASTATIN SODIUM 20 MG: 20 TABLET ORAL at 10:08

## 2018-01-01 RX ADMIN — HEPARIN SODIUM 400 UNITS/HR: 10000 INJECTION, SOLUTION INTRAVENOUS at 05:03

## 2018-01-01 RX ADMIN — AMLODIPINE BESYLATE 5 MG: 5 TABLET ORAL at 09:06

## 2018-01-01 RX ADMIN — CEFAZOLIN 2 G: 330 INJECTION, POWDER, FOR SOLUTION INTRAMUSCULAR; INTRAVENOUS at 08:03

## 2018-01-01 RX ADMIN — SODIUM CHLORIDE: 0.9 INJECTION, SOLUTION INTRAVENOUS at 09:08

## 2018-01-01 RX ADMIN — PHENYLEPHRINE HYDROCHLORIDE 100 MCG: 10 INJECTION INTRAVENOUS at 08:01

## 2018-01-01 RX ADMIN — HEPARIN SODIUM 15 UNITS/KG/HR: 10000 INJECTION, SOLUTION INTRAVENOUS at 09:03

## 2018-01-01 RX ADMIN — WARFARIN SODIUM 4 MG: 2 TABLET ORAL at 04:11

## 2018-01-01 RX ADMIN — PHENYLEPHRINE HYDROCHLORIDE 100 MCG: 10 INJECTION INTRAVENOUS at 01:03

## 2018-01-01 RX ADMIN — BENZONATATE 100 MG: 100 CAPSULE ORAL at 08:03

## 2018-01-01 RX ADMIN — PANTOPRAZOLE SODIUM 80 MG: 40 INJECTION, POWDER, FOR SOLUTION INTRAVENOUS at 08:01

## 2018-01-01 RX ADMIN — LISINOPRIL 5 MG: 5 TABLET ORAL at 11:01

## 2018-01-01 RX ADMIN — ONDANSETRON 4 MG: 2 INJECTION, SOLUTION INTRAMUSCULAR; INTRAVENOUS at 06:08

## 2018-01-01 RX ADMIN — SPIRONOLACTONE 25 MG: 25 TABLET ORAL at 08:10

## 2018-01-01 RX ADMIN — LIDOCAINE HYDROCHLORIDE 40 MG: 20 INJECTION, SOLUTION INTRAVENOUS at 01:01

## 2018-01-01 RX ADMIN — AMLODIPINE BESYLATE 5 MG: 5 TABLET ORAL at 01:03

## 2018-01-01 RX ADMIN — HEPARIN SODIUM 17 UNITS/KG/HR: 10000 INJECTION, SOLUTION INTRAVENOUS at 12:03

## 2018-01-01 RX ADMIN — AMIODARONE HYDROCHLORIDE 400 MG: 200 TABLET ORAL at 09:09

## 2018-01-01 RX ADMIN — BENZONATATE 100 MG: 100 CAPSULE ORAL at 11:03

## 2018-01-01 RX ADMIN — SODIUM CHLORIDE: 9 INJECTION, SOLUTION INTRAVENOUS at 02:11

## 2018-01-01 RX ADMIN — Medication 3 ML: at 06:01

## 2018-01-01 RX ADMIN — FERROUS SULFATE TAB EC 325 MG (65 MG FE EQUIVALENT) 325 MG: 325 (65 FE) TABLET DELAYED RESPONSE at 02:10

## 2018-01-01 RX ADMIN — FERROUS SULFATE TAB EC 325 MG (65 MG FE EQUIVALENT) 325 MG: 325 (65 FE) TABLET DELAYED RESPONSE at 08:11

## 2018-01-01 RX ADMIN — VASOPRESSIN 2 UNITS: 20 INJECTION INTRAVENOUS at 09:01

## 2018-01-01 RX ADMIN — ETOMIDATE 5 MG: 2 INJECTION, SOLUTION INTRAVENOUS at 08:01

## 2018-01-01 RX ADMIN — MEXILETINE HYDROCHLORIDE 200 MG: 200 CAPSULE ORAL at 08:09

## 2018-01-01 RX ADMIN — PANTOPRAZOLE SODIUM 40 MG: 40 INJECTION, POWDER, FOR SOLUTION INTRAVENOUS at 09:10

## 2018-01-01 RX ADMIN — MUPIROCIN 1 G: 20 OINTMENT TOPICAL at 09:03

## 2018-01-01 RX ADMIN — DRONABINOL 5 MG: 2.5 CAPSULE ORAL at 04:08

## 2018-01-01 RX ADMIN — LISINOPRIL 5 MG: 5 TABLET ORAL at 08:03

## 2018-01-01 RX ADMIN — HYDRALAZINE HYDROCHLORIDE 50 MG: 50 TABLET ORAL at 06:01

## 2018-01-01 RX ADMIN — AMIODARONE HYDROCHLORIDE 400 MG: 200 TABLET ORAL at 09:08

## 2018-01-01 RX ADMIN — FERROUS SULFATE TAB EC 325 MG (65 MG FE EQUIVALENT) 325 MG: 325 (65 FE) TABLET DELAYED RESPONSE at 09:12

## 2018-01-01 RX ADMIN — Medication 3 ML: at 06:03

## 2018-01-01 RX ADMIN — FUROSEMIDE 40 MG: 40 TABLET ORAL at 11:01

## 2018-01-01 RX ADMIN — HYDROCODONE BITARTRATE AND ACETAMINOPHEN 1 TABLET: 5; 325 TABLET ORAL at 04:03

## 2018-01-01 RX ADMIN — DRONABINOL 5 MG: 2.5 CAPSULE ORAL at 02:10

## 2018-01-01 RX ADMIN — FERROUS SULFATE TAB EC 325 MG (65 MG FE EQUIVALENT) 325 MG: 325 (65 FE) TABLET DELAYED RESPONSE at 09:10

## 2018-01-01 RX ADMIN — Medication 100 MCG: at 02:11

## 2018-01-01 RX ADMIN — NEOSTIGMINE METHYLSULFATE 5 MG: 1 INJECTION INTRAVENOUS at 01:03

## 2018-01-01 RX ADMIN — PROPOFOL 10 MG: 10 INJECTION, EMULSION INTRAVENOUS at 02:10

## 2018-01-01 RX ADMIN — BENZONATATE 100 MG: 100 CAPSULE ORAL at 04:03

## 2018-01-01 RX ADMIN — POLYETHYLENE GLYCOL 3350 17 G: 17 POWDER, FOR SOLUTION ORAL at 08:12

## 2018-01-01 RX ADMIN — AMIODARONE HYDROCHLORIDE 400 MG: 200 TABLET ORAL at 12:09

## 2018-01-01 RX ADMIN — HYDROCODONE BITARTRATE AND ACETAMINOPHEN 1 TABLET: 5; 325 TABLET ORAL at 05:03

## 2018-01-01 RX ADMIN — DEXTROSE 40 MG: 50 INJECTION, SOLUTION INTRAVENOUS at 08:08

## 2018-01-01 RX ADMIN — DRONABINOL 5 MG: 2.5 CAPSULE ORAL at 12:01

## 2018-01-01 RX ADMIN — KETAMINE HYDROCHLORIDE 20 MG: 100 INJECTION, SOLUTION, CONCENTRATE INTRAMUSCULAR; INTRAVENOUS at 01:01

## 2018-01-01 RX ADMIN — MAGNESIUM OXIDE TAB 400 MG (241.3 MG ELEMENTAL MG) 400 MG: 400 (241.3 MG) TAB at 11:01

## 2018-01-01 RX ADMIN — PHENYLEPHRINE HYDROCHLORIDE 100 MCG: 10 INJECTION INTRAVENOUS at 01:01

## 2018-01-01 RX ADMIN — DRONABINOL 5 MG: 2.5 CAPSULE ORAL at 03:10

## 2018-01-01 RX ADMIN — DRONABINOL 5 MG: 2.5 CAPSULE ORAL at 10:01

## 2018-01-01 RX ADMIN — SODIUM CHLORIDE: 0.9 INJECTION, SOLUTION INTRAVENOUS at 09:03

## 2018-01-01 RX ADMIN — PANTOPRAZOLE SODIUM 40 MG: 40 TABLET, DELAYED RELEASE ORAL at 04:01

## 2018-01-01 RX ADMIN — WARFARIN SODIUM 2.5 MG: 2.5 TABLET ORAL at 05:08

## 2018-01-01 RX ADMIN — SODIUM CHLORIDE 250 ML: 9 INJECTION, SOLUTION INTRAVENOUS at 01:03

## 2018-01-01 RX ADMIN — DIPHENHYDRAMINE HYDROCHLORIDE 25 MG: 25 CAPSULE ORAL at 04:08

## 2018-01-01 RX ADMIN — ACETAMINOPHEN 650 MG: 325 TABLET ORAL at 09:03

## 2018-01-01 RX ADMIN — AMIODARONE HYDROCHLORIDE 400 MG: 200 TABLET ORAL at 09:11

## 2018-01-01 RX ADMIN — Medication 3 ML: at 09:10

## 2018-01-01 RX ADMIN — MUPIROCIN: 20 OINTMENT TOPICAL at 08:11

## 2018-01-01 RX ADMIN — AMLODIPINE BESYLATE 5 MG: 5 TABLET ORAL at 05:08

## 2018-01-01 RX ADMIN — HYDROCODONE BITARTRATE AND ACETAMINOPHEN 1 TABLET: 5; 325 TABLET ORAL at 12:03

## 2018-01-01 RX ADMIN — AMIODARONE HYDROCHLORIDE 400 MG: 200 TABLET ORAL at 04:06

## 2018-01-01 RX ADMIN — PRAVASTATIN SODIUM 20 MG: 20 TABLET ORAL at 08:08

## 2018-01-01 RX ADMIN — CALCIUM CHLORIDE 1 G: 100 INJECTION, SOLUTION INTRAVENOUS at 11:12

## 2018-01-01 RX ADMIN — DIPHENHYDRAMINE HYDROCHLORIDE 25 MG: 25 CAPSULE ORAL at 10:03

## 2018-01-01 RX ADMIN — VANCOMYCIN HYDROCHLORIDE 750 MG: 750 INJECTION, POWDER, LYOPHILIZED, FOR SOLUTION INTRAVENOUS at 01:01

## 2018-01-01 RX ADMIN — SODIUM FERRIC GLUCONATE COMPLEX 125 MG: 12.5 INJECTION INTRAVENOUS at 10:01

## 2018-01-01 RX ADMIN — VANCOMYCIN HYDROCHLORIDE 1000 MG: 1 INJECTION, POWDER, LYOPHILIZED, FOR SOLUTION INTRAVENOUS at 04:11

## 2018-01-01 RX ADMIN — MIRTAZAPINE 7.5 MG: 7.5 TABLET ORAL at 09:09

## 2018-01-01 RX ADMIN — MAGNESIUM OXIDE TAB 400 MG (241.3 MG ELEMENTAL MG) 400 MG: 400 (241.3 MG) TAB at 09:01

## 2018-01-01 RX ADMIN — PRAVASTATIN SODIUM 20 MG: 20 TABLET ORAL at 10:03

## 2018-01-01 RX ADMIN — FUROSEMIDE 40 MG: 40 TABLET ORAL at 06:01

## 2018-01-01 RX ADMIN — Medication 3 ML: at 02:10

## 2018-01-01 RX ADMIN — GLYCOPYRROLATE 0.6 MG: 0.2 INJECTION, SOLUTION INTRAMUSCULAR; INTRAVENOUS at 01:03

## 2018-01-01 RX ADMIN — SODIUM CHLORIDE: 0.9 INJECTION, SOLUTION INTRAVENOUS at 02:10

## 2018-01-01 RX ADMIN — LISINOPRIL 5 MG: 5 TABLET ORAL at 09:01

## 2018-01-01 RX ADMIN — DRONABINOL 5 MG: 2.5 CAPSULE ORAL at 06:08

## 2018-01-01 RX ADMIN — PROPOFOL 50 MCG/KG/MIN: 10 INJECTION, EMULSION INTRAVENOUS at 11:08

## 2018-01-01 RX ADMIN — CIPROFLOXACIN 500 MG: 500 TABLET, FILM COATED ORAL at 09:06

## 2018-01-01 RX ADMIN — HEPARIN SODIUM 15 UNITS/KG/HR: 10000 INJECTION, SOLUTION INTRAVENOUS at 06:03

## 2018-01-01 RX ADMIN — PANTOPRAZOLE SODIUM 40 MG: 40 TABLET, DELAYED RELEASE ORAL at 08:09

## 2018-01-01 RX ADMIN — POLYETHYLENE GLYCOL 3350, SODIUM SULFATE ANHYDROUS, SODIUM BICARBONATE, SODIUM CHLORIDE, POTASSIUM CHLORIDE 4000 ML: 236; 22.74; 6.74; 5.86; 2.97 POWDER, FOR SOLUTION ORAL at 09:08

## 2018-01-01 RX ADMIN — PANTOPRAZOLE SODIUM 40 MG: 40 TABLET, DELAYED RELEASE ORAL at 09:06

## 2018-01-01 RX ADMIN — PANTOPRAZOLE SODIUM 40 MG: 40 TABLET, DELAYED RELEASE ORAL at 04:03

## 2018-01-01 RX ADMIN — MIRTAZAPINE 7.5 MG: 7.5 TABLET ORAL at 08:10

## 2018-01-01 RX ADMIN — MAGNESIUM OXIDE TAB 400 MG (241.3 MG ELEMENTAL MG) 400 MG: 400 (241.3 MG) TAB at 08:08

## 2018-01-01 RX ADMIN — ETOMIDATE 12 MG: 2 INJECTION, SOLUTION INTRAVENOUS at 08:01

## 2018-01-01 RX ADMIN — POTASSIUM CHLORIDE 60 MEQ: 1500 TABLET, EXTENDED RELEASE ORAL at 10:11

## 2018-01-01 RX ADMIN — PANTOPRAZOLE SODIUM 80 MG: 40 INJECTION, POWDER, FOR SOLUTION INTRAVENOUS at 10:01

## 2018-01-01 RX ADMIN — DRONABINOL 5 MG: 2.5 CAPSULE ORAL at 03:01

## 2018-01-01 RX ADMIN — ROCURONIUM BROMIDE 50 MG: 10 INJECTION, SOLUTION INTRAVENOUS at 12:03

## 2018-01-01 RX ADMIN — FUROSEMIDE 20 MG: 20 TABLET ORAL at 05:08

## 2018-01-01 RX ADMIN — PANTOPRAZOLE SODIUM 40 MG: 40 TABLET, DELAYED RELEASE ORAL at 09:12

## 2018-01-01 RX ADMIN — PANTOPRAZOLE SODIUM 160 MG/HR: 40 INJECTION, POWDER, FOR SOLUTION INTRAVENOUS at 10:03

## 2018-01-01 RX ADMIN — PRAVASTATIN SODIUM 20 MG: 20 TABLET ORAL at 09:06

## 2018-01-01 RX ADMIN — DRONABINOL 5 MG: 2.5 CAPSULE ORAL at 04:06

## 2018-01-01 RX ADMIN — DIPHENHYDRAMINE HYDROCHLORIDE 25 MG: 25 CAPSULE ORAL at 06:08

## 2018-01-01 RX ADMIN — EPINEPHRINE 20 MCG: 1 INJECTION, SOLUTION INTRAMUSCULAR; SUBCUTANEOUS at 09:01

## 2018-01-01 RX ADMIN — POLYETHYLENE GLYCOL 3350 17 G: 17 POWDER, FOR SOLUTION ORAL at 08:01

## 2018-01-01 RX ADMIN — MEXILETINE HYDROCHLORIDE 200 MG: 200 CAPSULE ORAL at 10:09

## 2018-01-01 RX ADMIN — Medication 10 ML: at 05:03

## 2018-01-01 RX ADMIN — HYDRALAZINE HYDROCHLORIDE 10 MG: 20 INJECTION INTRAMUSCULAR; INTRAVENOUS at 10:03

## 2018-01-01 RX ADMIN — ETOMIDATE 6 MG: 2 INJECTION, SOLUTION INTRAVENOUS at 08:01

## 2018-01-01 RX ADMIN — LIDOCAINE HYDROCHLORIDE 50 MG: 20 INJECTION, SOLUTION INTRAVENOUS at 08:01

## 2018-01-01 RX ADMIN — ACETAMINOPHEN 650 MG: 325 TABLET, FILM COATED ORAL at 09:11

## 2018-01-01 RX ADMIN — PHENYLEPHRINE HYDROCHLORIDE 250 MCG: 10 INJECTION INTRAVENOUS at 08:01

## 2018-01-01 RX ADMIN — POLYETHYLENE GLYCOL 3350 17 G: 17 POWDER, FOR SOLUTION ORAL at 10:01

## 2018-01-01 RX ADMIN — AMIODARONE HYDROCHLORIDE 400 MG: 200 TABLET ORAL at 02:06

## 2018-01-01 RX ADMIN — MEXILETINE HYDROCHLORIDE 200 MG: 200 CAPSULE ORAL at 08:10

## 2018-01-01 RX ADMIN — WARFARIN SODIUM 2.5 MG: 2.5 TABLET ORAL at 05:11

## 2018-01-01 RX ADMIN — Medication 0.1 MCG/KG/MIN: at 07:12

## 2018-01-01 RX ADMIN — FUROSEMIDE 40 MG: 10 INJECTION INTRAMUSCULAR; INTRAVENOUS at 09:12

## 2018-01-01 RX ADMIN — POTASSIUM CHLORIDE 10 MEQ: 10 INJECTION, SOLUTION INTRAVENOUS at 05:03

## 2018-01-01 RX ADMIN — MEXILETINE HYDROCHLORIDE 200 MG: 200 CAPSULE ORAL at 09:09

## 2018-01-01 RX ADMIN — HYDROCODONE BITARTRATE AND ACETAMINOPHEN 1 TABLET: 5; 325 TABLET ORAL at 09:06

## 2018-01-01 RX ADMIN — WARFARIN SODIUM 1.5 MG: 1 TABLET ORAL at 07:10

## 2018-01-01 RX ADMIN — FERROUS SULFATE TAB EC 325 MG (65 MG FE EQUIVALENT) 325 MG: 325 (65 FE) TABLET DELAYED RESPONSE at 02:11

## 2018-01-01 RX ADMIN — FERROUS SULFATE TAB EC 325 MG (65 MG FE EQUIVALENT) 325 MG: 325 (65 FE) TABLET DELAYED RESPONSE at 04:10

## 2018-01-01 RX ADMIN — LISINOPRIL 5 MG: 5 TABLET ORAL at 03:01

## 2018-01-01 RX ADMIN — PRAVASTATIN SODIUM 20 MG: 20 TABLET ORAL at 11:01

## 2018-01-01 RX ADMIN — DAPTOMYCIN 755 MG: 500 INJECTION, POWDER, LYOPHILIZED, FOR SOLUTION INTRAVENOUS at 06:11

## 2018-01-01 RX ADMIN — CEFTAROLINE FOSAMIL 300 MG: 600 POWDER, FOR SOLUTION INTRAVENOUS at 08:12

## 2018-01-01 RX ADMIN — BENZONATATE 100 MG: 100 CAPSULE ORAL at 01:03

## 2018-01-01 RX ADMIN — MUPIROCIN: 20 OINTMENT TOPICAL at 09:11

## 2018-01-01 RX ADMIN — AMIODARONE HYDROCHLORIDE 400 MG: 200 TABLET ORAL at 11:11

## 2018-01-01 RX ADMIN — MAGNESIUM OXIDE TAB 400 MG (241.3 MG ELEMENTAL MG) 400 MG: 400 (241.3 MG) TAB at 08:10

## 2018-01-01 RX ADMIN — PANTOPRAZOLE SODIUM 40 MG: 40 TABLET, DELAYED RELEASE ORAL at 03:01

## 2018-01-01 RX ADMIN — DRONABINOL 5 MG: 2.5 CAPSULE ORAL at 11:08

## 2018-01-01 RX ADMIN — POLYETHYLENE GLYCOL 3350 17 G: 17 POWDER, FOR SOLUTION ORAL at 09:01

## 2018-01-01 RX ADMIN — Medication 0.02 MCG/KG/MIN: at 06:11

## 2018-01-01 RX ADMIN — ETOMIDATE 4 MG: 2 INJECTION, SOLUTION INTRAVENOUS at 02:11

## 2018-01-01 RX ADMIN — LISINOPRIL 5 MG: 5 TABLET ORAL at 01:03

## 2018-01-01 RX ADMIN — AMIODARONE HYDROCHLORIDE 400 MG: 200 TABLET ORAL at 08:06

## 2018-01-01 RX ADMIN — LISINOPRIL 2.5 MG: 2.5 TABLET ORAL at 08:10

## 2018-01-01 RX ADMIN — SPIRONOLACTONE 25 MG: 25 TABLET, FILM COATED ORAL at 05:08

## 2018-01-01 RX ADMIN — PHENYLEPHRINE HYDROCHLORIDE 100 MCG: 10 INJECTION INTRAVENOUS at 09:01

## 2018-01-01 RX ADMIN — ONDANSETRON 4 MG: 2 INJECTION INTRAMUSCULAR; INTRAVENOUS at 03:03

## 2018-01-01 RX ADMIN — CEFEPIME 1 G: 1 INJECTION, POWDER, FOR SOLUTION INTRAMUSCULAR; INTRAVENOUS at 09:11

## 2018-01-01 RX ADMIN — HYDROCODONE BITARTRATE AND ACETAMINOPHEN 1 TABLET: 5; 325 TABLET ORAL at 09:10

## 2018-01-01 RX ADMIN — ACETAMINOPHEN 650 MG: 325 TABLET, FILM COATED ORAL at 11:11

## 2018-01-01 RX ADMIN — DRONABINOL 5 MG: 2.5 CAPSULE ORAL at 09:09

## 2018-01-01 RX ADMIN — POLYETHYLENE GLYCOL 3350 17 G: 17 POWDER, FOR SOLUTION ORAL at 09:06

## 2018-01-01 RX ADMIN — PHENYLEPHRINE HYDROCHLORIDE 100 MCG: 10 INJECTION INTRAVENOUS at 03:03

## 2018-01-01 RX ADMIN — POTASSIUM CHLORIDE 20 MEQ: 1500 TABLET, EXTENDED RELEASE ORAL at 09:03

## 2018-01-01 RX ADMIN — SODIUM CHLORIDE 250 MG: 9 INJECTION, SOLUTION INTRAVENOUS at 08:08

## 2018-01-01 RX ADMIN — DRONABINOL 5 MG: 2.5 CAPSULE ORAL at 09:08

## 2018-01-01 RX ADMIN — ACETAMINOPHEN 650 MG: 325 TABLET ORAL at 09:11

## 2018-01-01 RX ADMIN — CEFTAROLINE FOSAMIL 300 MG: 600 POWDER, FOR SOLUTION INTRAVENOUS at 11:11

## 2018-01-01 RX ADMIN — PANTOPRAZOLE SODIUM 40 MG: 40 TABLET, DELAYED RELEASE ORAL at 08:12

## 2018-01-01 RX ADMIN — SODIUM CHLORIDE 50 ML: 0.9 INJECTION, SOLUTION INTRAVENOUS at 01:01

## 2018-01-01 RX ADMIN — ONDANSETRON HYDROCHLORIDE 4 MG: 2 INJECTION, SOLUTION INTRAMUSCULAR; INTRAVENOUS at 03:03

## 2018-01-01 RX ADMIN — PROPOFOL 30 MG: 10 INJECTION, EMULSION INTRAVENOUS at 08:01

## 2018-01-01 RX ADMIN — SODIUM CHLORIDE: 0.9 INJECTION, SOLUTION INTRAVENOUS at 11:11

## 2018-01-01 RX ADMIN — PANTOPRAZOLE SODIUM 40 MG: 40 INJECTION, POWDER, FOR SOLUTION INTRAVENOUS at 10:03

## 2018-01-01 RX ADMIN — PRAVASTATIN SODIUM 20 MG: 20 TABLET ORAL at 08:09

## 2018-01-01 RX ADMIN — AMIODARONE HYDROCHLORIDE 400 MG: 200 TABLET ORAL at 09:06

## 2018-01-01 RX ADMIN — DRONABINOL 5 MG: 2.5 CAPSULE ORAL at 10:06

## 2018-01-01 RX ADMIN — PANTOPRAZOLE SODIUM 80 MG: 40 INJECTION, POWDER, FOR SOLUTION INTRAVENOUS at 11:01

## 2018-01-01 RX ADMIN — HEPARIN SODIUM 400 UNITS/HR: 10000 INJECTION, SOLUTION INTRAVENOUS at 06:03

## 2018-01-01 RX ADMIN — WARFARIN SODIUM 7.5 MG: 7.5 TABLET ORAL at 05:01

## 2018-01-01 RX ADMIN — AMIODARONE HYDROCHLORIDE 600 MG: 200 TABLET ORAL at 08:09

## 2018-01-01 RX ADMIN — POTASSIUM CHLORIDE 60 MEQ: 750 CAPSULE, EXTENDED RELEASE ORAL at 08:01

## 2018-01-01 RX ADMIN — DRONABINOL 5 MG: 2.5 CAPSULE ORAL at 12:08

## 2018-01-01 RX ADMIN — POTASSIUM CHLORIDE 20 MEQ: 750 CAPSULE, EXTENDED RELEASE ORAL at 08:08

## 2018-01-01 RX ADMIN — PHENYLEPHRINE HYDROCHLORIDE 200 MCG: 10 INJECTION INTRAVENOUS at 08:01

## 2018-01-01 RX ADMIN — DOXYCYCLINE HYCLATE 100 MG: 100 TABLET, COATED ORAL at 08:06

## 2018-01-01 RX ADMIN — Medication 1500 MG: at 01:01

## 2018-01-01 RX ADMIN — MUPIROCIN: 20 OINTMENT TOPICAL at 04:11

## 2018-01-01 RX ADMIN — LIDOCAINE HYDROCHLORIDE 40 MG: 20 INJECTION, SOLUTION INTRAVENOUS at 09:03

## 2018-01-01 RX ADMIN — LISINOPRIL 5 MG: 5 TABLET ORAL at 09:06

## 2018-01-01 RX ADMIN — FUROSEMIDE 20 MG: 20 TABLET ORAL at 05:09

## 2018-01-01 RX ADMIN — DAPTOMYCIN 755 MG: 500 INJECTION, POWDER, LYOPHILIZED, FOR SOLUTION INTRAVENOUS at 05:11

## 2018-01-01 RX ADMIN — AMLODIPINE BESYLATE 5 MG: 5 TABLET ORAL at 08:01

## 2018-01-01 RX ADMIN — FUROSEMIDE 20 MG: 20 TABLET ORAL at 08:06

## 2018-01-01 RX ADMIN — HYDRALAZINE HYDROCHLORIDE 5 MG: 20 INJECTION INTRAMUSCULAR; INTRAVENOUS at 07:10

## 2018-01-01 RX ADMIN — ETOMIDATE 10 MG: 2 INJECTION, SOLUTION INTRAVENOUS at 11:08

## 2018-01-01 RX ADMIN — PROPOFOL 40 MG: 10 INJECTION, EMULSION INTRAVENOUS at 03:03

## 2018-01-01 RX ADMIN — DRONABINOL 5 MG: 2.5 CAPSULE ORAL at 04:09

## 2018-01-01 RX ADMIN — Medication 3 ML: at 04:10

## 2018-01-01 RX ADMIN — LISINOPRIL 5 MG: 5 TABLET ORAL at 09:11

## 2018-01-01 RX ADMIN — POTASSIUM CHLORIDE 10 MEQ: 10 INJECTION, SOLUTION INTRAVENOUS at 04:03

## 2018-01-01 RX ADMIN — LIDOCAINE HYDROCHLORIDE 15 ML: 20 SOLUTION ORAL; TOPICAL at 11:08

## 2018-01-01 RX ADMIN — EPINEPHRINE 10 MCG: 1 INJECTION, SOLUTION INTRAMUSCULAR; SUBCUTANEOUS at 09:01

## 2018-01-01 RX ADMIN — DRONABINOL 5 MG: 2.5 CAPSULE ORAL at 08:09

## 2018-01-01 RX ADMIN — INFLUENZA A VIRUS A/MICHIGAN/45/2015 X-275 (H1N1) ANTIGEN (FORMALDEHYDE INACTIVATED), INFLUENZA A VIRUS A/SINGAPORE/INFIMH-16-0019/2016 IVR-186 (H3N2) ANTIGEN (FORMALDEHYDE INACTIVATED), AND INFLUENZA B VIRUS B/MARYLAND/15/2016 BX-69A (A B/COLORADO/6/2017-LIKE VIRUS) ANTIGEN (FORMALDEHYDE INACTIVATED) 0.5 ML: 60; 60; 60 INJECTION, SUSPENSION INTRAMUSCULAR at 04:09

## 2018-01-01 RX ADMIN — Medication 3 ML: at 09:01

## 2018-01-01 RX ADMIN — AMLODIPINE BESYLATE 5 MG: 5 TABLET ORAL at 09:01

## 2018-01-01 RX ADMIN — POLYETHYLENE GLYCOL 3350, SODIUM SULFATE ANHYDROUS, SODIUM BICARBONATE, SODIUM CHLORIDE, POTASSIUM CHLORIDE 2000 ML: 236; 22.74; 6.74; 5.86; 2.97 POWDER, FOR SOLUTION ORAL at 07:01

## 2018-01-01 RX ADMIN — PHENYLEPHRINE HYDROCHLORIDE 150 MCG: 10 INJECTION INTRAVENOUS at 08:01

## 2018-01-01 RX ADMIN — HYDRALAZINE HYDROCHLORIDE 10 MG: 20 INJECTION INTRAMUSCULAR; INTRAVENOUS at 12:09

## 2018-01-01 RX ADMIN — AMIODARONE HYDROCHLORIDE 150 MG: 1.5 INJECTION, SOLUTION INTRAVENOUS at 09:11

## 2018-01-01 RX ADMIN — AMIODARONE HYDROCHLORIDE 400 MG: 200 TABLET ORAL at 08:08

## 2018-01-01 RX ADMIN — HEPARIN SODIUM 15 UNITS/KG/HR: 10000 INJECTION, SOLUTION INTRAVENOUS at 04:03

## 2018-01-01 RX ADMIN — DIPHENHYDRAMINE HYDROCHLORIDE 25 MG: 25 CAPSULE ORAL at 09:06

## 2018-01-01 RX ADMIN — POLYETHYLENE GLYCOL 3350 17 G: 17 POWDER, FOR SOLUTION ORAL at 09:03

## 2018-01-01 RX ADMIN — ETOMIDATE 4 MG: 2 INJECTION, SOLUTION INTRAVENOUS at 09:03

## 2018-01-01 RX ADMIN — POTASSIUM CHLORIDE 20 MEQ: 750 CAPSULE, EXTENDED RELEASE ORAL at 08:06

## 2018-01-01 RX ADMIN — LISINOPRIL 5 MG: 5 TABLET ORAL at 08:06

## 2018-01-01 RX ADMIN — SODIUM FERRIC GLUCONATE COMPLEX 250 MG: 12.5 INJECTION INTRAVENOUS at 09:01

## 2018-01-01 RX ADMIN — ACETAMINOPHEN 650 MG: 325 TABLET, FILM COATED ORAL at 08:12

## 2018-01-01 RX ADMIN — LISINOPRIL 2.5 MG: 2.5 TABLET ORAL at 05:10

## 2018-01-01 RX ADMIN — FERROUS SULFATE TAB EC 325 MG (65 MG FE EQUIVALENT) 325 MG: 325 (65 FE) TABLET DELAYED RESPONSE at 08:12

## 2018-01-01 RX ADMIN — DEXTROSE 40 MG: 50 INJECTION, SOLUTION INTRAVENOUS at 09:06

## 2018-01-01 RX ADMIN — Medication 0.12 MCG/KG/MIN: at 12:12

## 2018-01-01 RX ADMIN — AMLODIPINE BESYLATE 10 MG: 10 TABLET ORAL at 08:01

## 2018-01-01 RX ADMIN — PANTOPRAZOLE SODIUM 40 MG: 40 TABLET, DELAYED RELEASE ORAL at 09:09

## 2018-01-01 RX ADMIN — MAGNESIUM OXIDE TAB 400 MG (241.3 MG ELEMENTAL MG) 400 MG: 400 (241.3 MG) TAB at 10:03

## 2018-01-01 RX ADMIN — SODIUM CHLORIDE: 0.9 INJECTION, SOLUTION INTRAVENOUS at 08:11

## 2018-01-01 RX ADMIN — SODIUM CHLORIDE: 0.9 INJECTION, SOLUTION INTRAVENOUS at 10:08

## 2018-01-01 RX ADMIN — DOXYCYCLINE HYCLATE 100 MG: 100 TABLET, COATED ORAL at 09:06

## 2018-01-01 RX ADMIN — FUROSEMIDE 40 MG: 40 TABLET ORAL at 05:10

## 2018-01-01 RX ADMIN — WARFARIN SODIUM 0.5 MG: 1 TABLET ORAL at 05:03

## 2018-01-01 RX ADMIN — CEFTAROLINE FOSAMIL 300 MG: 600 POWDER, FOR SOLUTION INTRAVENOUS at 07:12

## 2018-01-01 RX ADMIN — WARFARIN SODIUM 3 MG: 3 TABLET ORAL at 04:11

## 2018-01-01 RX ADMIN — FUROSEMIDE 20 MG: 20 TABLET ORAL at 06:09

## 2018-01-01 RX ADMIN — ETOMIDATE 4 MG: 2 INJECTION, SOLUTION INTRAVENOUS at 11:08

## 2018-01-01 RX ADMIN — POTASSIUM CHLORIDE 20 MEQ: 750 CAPSULE, EXTENDED RELEASE ORAL at 01:03

## 2018-01-01 RX ADMIN — SODIUM CHLORIDE 250 MG: 9 INJECTION, SOLUTION INTRAVENOUS at 01:08

## 2018-01-01 RX ADMIN — HYDRALAZINE HYDROCHLORIDE 50 MG: 50 TABLET ORAL at 05:01

## 2018-01-01 RX ADMIN — CEFAZOLIN 2 G: 330 INJECTION, POWDER, FOR SOLUTION INTRAMUSCULAR; INTRAVENOUS at 12:03

## 2018-01-01 RX ADMIN — VANCOMYCIN HYDROCHLORIDE 1000 MG: 1 INJECTION, POWDER, LYOPHILIZED, FOR SOLUTION INTRAVENOUS at 06:11

## 2018-01-01 RX ADMIN — WARFARIN SODIUM 1 MG: 1 TABLET ORAL at 04:03

## 2018-01-01 RX ADMIN — AMIODARONE HYDROCHLORIDE 0.5 MG/MIN: 1.8 INJECTION, SOLUTION INTRAVENOUS at 07:11

## 2018-01-01 RX ADMIN — CEFTAROLINE FOSAMIL 300 MG: 600 POWDER, FOR SOLUTION INTRAVENOUS at 02:12

## 2018-01-01 RX ADMIN — DRONABINOL 5 MG: 2.5 CAPSULE ORAL at 04:11

## 2018-01-01 RX ADMIN — LIDOCAINE HYDROCHLORIDE 30 MG: 20 INJECTION, SOLUTION INTRAVENOUS at 11:08

## 2018-01-01 RX ADMIN — CIPROFLOXACIN 500 MG: 500 TABLET, FILM COATED ORAL at 08:06

## 2018-01-01 RX ADMIN — MEXILETINE HYDROCHLORIDE 200 MG: 200 CAPSULE ORAL at 12:09

## 2018-01-01 RX ADMIN — MIDAZOLAM HYDROCHLORIDE 2 MG: 1 INJECTION, SOLUTION INTRAMUSCULAR; INTRAVENOUS at 12:03

## 2018-01-01 RX ADMIN — FENTANYL CITRATE 100 MCG: 50 INJECTION, SOLUTION INTRAMUSCULAR; INTRAVENOUS at 12:03

## 2018-01-01 RX ADMIN — SODIUM FERRIC GLUCONATE COMPLEX 250 MG: 12.5 INJECTION INTRAVENOUS at 10:01

## 2018-01-01 RX ADMIN — POLYETHYLENE GLYCOL 3350, SODIUM SULFATE ANHYDROUS, SODIUM BICARBONATE, SODIUM CHLORIDE, POTASSIUM CHLORIDE 4000 ML: 236; 22.74; 6.74; 5.86; 2.97 POWDER, FOR SOLUTION ORAL at 10:03

## 2018-01-01 RX ADMIN — SODIUM CHLORIDE, PRESERVATIVE FREE 3 ML: 5 INJECTION INTRAVENOUS at 09:01

## 2018-01-01 RX ADMIN — SODIUM CHLORIDE, PRESERVATIVE FREE 3 ML: 5 INJECTION INTRAVENOUS at 05:01

## 2018-01-01 RX ADMIN — DIPHENHYDRAMINE HYDROCHLORIDE 25 MG: 25 CAPSULE ORAL at 09:08

## 2018-01-01 RX ADMIN — POTASSIUM CHLORIDE 60 MEQ: 750 CAPSULE, EXTENDED RELEASE ORAL at 11:01

## 2018-01-01 RX ADMIN — DRONABINOL 5 MG: 2.5 CAPSULE ORAL at 10:03

## 2018-01-01 RX ADMIN — MAGNESIUM SULFATE IN WATER 2 G: 40 INJECTION, SOLUTION INTRAVENOUS at 10:01

## 2018-01-01 RX ADMIN — DRONABINOL 5 MG: 2.5 CAPSULE ORAL at 03:08

## 2018-01-01 RX ADMIN — ACETAMINOPHEN 650 MG: 325 TABLET, FILM COATED ORAL at 03:11

## 2018-01-01 RX ADMIN — SPIRONOLACTONE 25 MG: 25 TABLET, FILM COATED ORAL at 12:09

## 2018-01-01 RX ADMIN — Medication 25 MG: at 09:03

## 2018-01-01 RX ADMIN — HYDROCODONE BITARTRATE AND ACETAMINOPHEN 1 TABLET: 5; 325 TABLET ORAL at 01:03

## 2018-01-01 RX ADMIN — LISINOPRIL 2.5 MG: 2.5 TABLET ORAL at 04:10

## 2018-01-01 RX ADMIN — DRONABINOL 5 MG: 2.5 CAPSULE ORAL at 09:06

## 2018-01-01 RX ADMIN — AMLODIPINE BESYLATE 5 MG: 5 TABLET ORAL at 10:01

## 2018-01-01 RX ADMIN — AMLODIPINE BESYLATE 5 MG: 5 TABLET ORAL at 08:06

## 2018-01-01 RX ADMIN — HYDROCODONE BITARTRATE AND ACETAMINOPHEN 1 TABLET: 5; 325 TABLET ORAL at 10:03

## 2018-01-01 RX ADMIN — HEPARIN SODIUM 17 UNITS/KG/HR: 10000 INJECTION, SOLUTION INTRAVENOUS at 05:01

## 2018-01-01 RX ADMIN — HYDRALAZINE HYDROCHLORIDE 50 MG: 50 TABLET ORAL at 09:01

## 2018-01-01 RX ADMIN — WARFARIN SODIUM 6 MG: 3 TABLET ORAL at 05:01

## 2018-01-01 RX ADMIN — AMLODIPINE BESYLATE 10 MG: 10 TABLET ORAL at 10:01

## 2018-01-01 RX ADMIN — DRONABINOL 5 MG: 2.5 CAPSULE ORAL at 12:03

## 2018-01-01 RX ADMIN — PROPOFOL 50 MCG/KG/MIN: 10 INJECTION, EMULSION INTRAVENOUS at 02:11

## 2018-01-01 RX ADMIN — POTASSIUM CHLORIDE 60 MEQ: 750 CAPSULE, EXTENDED RELEASE ORAL at 09:01

## 2018-01-01 RX ADMIN — PANTOPRAZOLE SODIUM 40 MG: 40 TABLET, DELAYED RELEASE ORAL at 12:09

## 2018-01-01 RX ADMIN — BACLOFEN 10 MG: 10 TABLET ORAL at 09:03

## 2018-01-01 RX ADMIN — POLYETHYLENE GLYCOL 3350 17 G: 17 POWDER, FOR SOLUTION ORAL at 10:03

## 2018-01-01 RX ADMIN — HEPARIN SODIUM 18 UNITS/KG/HR: 10000 INJECTION, SOLUTION INTRAVENOUS at 07:01

## 2018-01-01 RX ADMIN — DRONABINOL 5 MG: 2.5 CAPSULE ORAL at 03:03

## 2018-01-01 RX ADMIN — WARFARIN SODIUM 2.5 MG: 2.5 TABLET ORAL at 04:08

## 2018-01-01 RX ADMIN — POLYETHYLENE GLYCOL 3350 17 G: 17 POWDER, FOR SOLUTION ORAL at 02:11

## 2018-01-01 RX ADMIN — WARFARIN SODIUM 3 MG: 2 TABLET ORAL at 04:11

## 2018-01-01 RX ADMIN — DIPHENHYDRAMINE HYDROCHLORIDE 25 MG: 25 CAPSULE ORAL at 06:01

## 2018-01-01 RX ADMIN — PROPOFOL 20 MG: 10 INJECTION, EMULSION INTRAVENOUS at 02:11

## 2018-01-01 RX ADMIN — FENTANYL CITRATE 50 MCG: 50 INJECTION, SOLUTION INTRAMUSCULAR; INTRAVENOUS at 06:03

## 2018-01-01 RX ADMIN — SODIUM CHLORIDE 2343 ML: 0.9 INJECTION, SOLUTION INTRAVENOUS at 04:11

## 2018-01-01 RX ADMIN — CEFTAROLINE FOSAMIL 300 MG: 400 POWDER, FOR SOLUTION INTRAVENOUS at 09:12

## 2018-01-01 RX ADMIN — WARFARIN SODIUM 5 MG: 5 TABLET ORAL at 06:03

## 2018-01-01 RX ADMIN — LISINOPRIL 2.5 MG: 2.5 TABLET ORAL at 08:11

## 2018-01-01 RX ADMIN — Medication 10 ML: at 01:03

## 2018-01-01 RX ADMIN — MAGNESIUM SULFATE HEPTAHYDRATE 1 G: 500 INJECTION, SOLUTION INTRAMUSCULAR; INTRAVENOUS at 09:03

## 2018-01-01 RX ADMIN — CEFTAROLINE FOSAMIL 300 MG: 600 POWDER, FOR SOLUTION INTRAVENOUS at 11:12

## 2018-01-01 RX ADMIN — POTASSIUM CHLORIDE 20 MEQ: 750 CAPSULE, EXTENDED RELEASE ORAL at 09:08

## 2018-01-01 RX ADMIN — HEPARIN SODIUM 17 UNITS/KG/HR: 10000 INJECTION, SOLUTION INTRAVENOUS at 10:03

## 2018-01-01 RX ADMIN — SODIUM CHLORIDE 250 MG: 9 INJECTION, SOLUTION INTRAVENOUS at 09:08

## 2018-01-01 RX ADMIN — LISINOPRIL 2.5 MG: 2.5 TABLET ORAL at 09:11

## 2018-01-01 RX ADMIN — MIRTAZAPINE 7.5 MG: 7.5 TABLET ORAL at 10:11

## 2018-01-01 RX ADMIN — PROPOFOL 20 MG: 10 INJECTION, EMULSION INTRAVENOUS at 11:08

## 2018-01-01 RX ADMIN — FUROSEMIDE 20 MG: 20 TABLET ORAL at 08:01

## 2018-01-01 RX ADMIN — WARFARIN SODIUM 4 MG: 4 TABLET ORAL at 06:01

## 2018-01-01 RX ADMIN — HEPARIN SODIUM 400 UNITS/HR: 10000 INJECTION, SOLUTION INTRAVENOUS at 09:03

## 2018-01-01 RX ADMIN — LIDOCAINE HYDROCHLORIDE 20 MG: 20 INJECTION, SOLUTION INTRAVENOUS at 02:11

## 2018-01-01 RX ADMIN — EPINEPHRINE 15 MCG: 1 INJECTION, SOLUTION, CONCENTRATE INTRAVENOUS at 02:11

## 2018-01-01 RX ADMIN — POTASSIUM CHLORIDE 60 MEQ: 750 CAPSULE, EXTENDED RELEASE ORAL at 10:01

## 2018-01-01 RX ADMIN — POTASSIUM CHLORIDE 20 MEQ: 750 CAPSULE, EXTENDED RELEASE ORAL at 09:06

## 2018-01-01 RX ADMIN — PROPOFOL 50 MG: 10 INJECTION, EMULSION INTRAVENOUS at 01:01

## 2018-01-01 RX ADMIN — CEFEPIME 1 G: 1 INJECTION, POWDER, FOR SOLUTION INTRAMUSCULAR; INTRAVENOUS at 01:11

## 2018-01-01 RX ADMIN — Medication 3 ML: at 10:11

## 2018-01-01 RX ADMIN — SUCCINYLCHOLINE CHLORIDE 100 MG: 20 INJECTION, SOLUTION INTRAMUSCULAR; INTRAVENOUS at 08:01

## 2018-01-01 RX ADMIN — SODIUM CHLORIDE 250 MG: 9 INJECTION, SOLUTION INTRAVENOUS at 09:06

## 2018-01-01 RX ADMIN — MAGNESIUM OXIDE TAB 400 MG (241.3 MG ELEMENTAL MG) 400 MG: 400 (241.3 MG) TAB at 09:08

## 2018-01-01 RX ADMIN — HEPARIN SODIUM 17 UNITS/KG/HR: 10000 INJECTION, SOLUTION INTRAVENOUS at 06:03

## 2018-01-01 RX ADMIN — AMIODARONE HYDROCHLORIDE 1 MG/MIN: 1.8 INJECTION, SOLUTION INTRAVENOUS at 09:11

## 2018-01-01 RX ADMIN — HEPARIN SODIUM 19 UNITS/KG/HR: 10000 INJECTION, SOLUTION INTRAVENOUS at 09:01

## 2018-01-01 RX ADMIN — SODIUM CHLORIDE: 0.9 INJECTION, SOLUTION INTRAVENOUS at 02:03

## 2018-01-01 RX ADMIN — POTASSIUM CHLORIDE 30 MEQ: 750 CAPSULE, EXTENDED RELEASE ORAL at 03:01

## 2018-01-01 RX ADMIN — ONDANSETRON 4 MG: 2 INJECTION INTRAMUSCULAR; INTRAVENOUS at 05:10

## 2018-01-01 RX ADMIN — FERROUS SULFATE TAB EC 325 MG (65 MG FE EQUIVALENT) 325 MG: 325 (65 FE) TABLET DELAYED RESPONSE at 08:10

## 2018-01-01 RX ADMIN — SODIUM CHLORIDE 500 ML: 0.9 INJECTION, SOLUTION INTRAVENOUS at 09:10

## 2018-01-01 RX ADMIN — LISINOPRIL 5 MG: 5 TABLET ORAL at 10:01

## 2018-01-01 RX ADMIN — PANTOPRAZOLE SODIUM 40 MG: 40 TABLET, DELAYED RELEASE ORAL at 05:08

## 2018-01-01 RX ADMIN — MIDAZOLAM 2 MG: 1 INJECTION INTRAMUSCULAR; INTRAVENOUS at 08:01

## 2018-01-01 RX ADMIN — Medication 3 ML: at 04:11

## 2018-01-01 RX ADMIN — PROPOFOL 50 MCG/KG/MIN: 10 INJECTION, EMULSION INTRAVENOUS at 08:01

## 2018-01-01 RX ADMIN — ACETAMINOPHEN 650 MG: 325 TABLET ORAL at 03:01

## 2018-01-01 RX ADMIN — HEPARIN SODIUM 17 UNITS/KG/HR: 10000 INJECTION, SOLUTION INTRAVENOUS at 07:03

## 2018-01-01 RX ADMIN — TRAMADOL HYDROCHLORIDE 50 MG: 50 TABLET, FILM COATED ORAL at 01:11

## 2018-01-01 RX ADMIN — DRONABINOL 5 MG: 2.5 CAPSULE ORAL at 12:09

## 2018-01-01 RX ADMIN — FUROSEMIDE 20 MG: 20 TABLET ORAL at 08:09

## 2018-01-01 RX ADMIN — POTASSIUM CHLORIDE 10 MEQ: 10 INJECTION, SOLUTION INTRAVENOUS at 06:03

## 2018-01-01 RX ADMIN — POLYETHYLENE GLYCOL 3350, SODIUM SULFATE ANHYDROUS, SODIUM BICARBONATE, SODIUM CHLORIDE, POTASSIUM CHLORIDE 2000 ML: 236; 22.74; 6.74; 5.86; 2.97 POWDER, FOR SOLUTION ORAL at 04:01

## 2018-01-01 RX ADMIN — BACLOFEN 10 MG: 10 TABLET ORAL at 12:03

## 2018-01-01 RX ADMIN — Medication 0.1 MCG/KG/MIN: at 03:12

## 2018-01-01 RX ADMIN — SPIRONOLACTONE 25 MG: 25 TABLET, FILM COATED ORAL at 08:08

## 2018-01-01 RX ADMIN — BENZONATATE 100 MG: 100 CAPSULE ORAL at 05:03

## 2018-01-01 RX ADMIN — POTASSIUM CHLORIDE 50 MEQ: 750 CAPSULE, EXTENDED RELEASE ORAL at 05:10

## 2018-01-01 RX ADMIN — LIDOCAINE HYDROCHLORIDE 100 MG: 20 INJECTION, SOLUTION INTRAVENOUS at 03:03

## 2018-01-01 RX ADMIN — FUROSEMIDE 20 MG: 20 TABLET ORAL at 09:08

## 2018-01-01 RX ADMIN — Medication 0.06 MCG/KG/MIN: at 02:12

## 2018-01-01 RX ADMIN — SODIUM CHLORIDE: 0.9 INJECTION, SOLUTION INTRAVENOUS at 12:01

## 2018-01-01 RX ADMIN — FUROSEMIDE 20 MG: 20 TABLET ORAL at 05:01

## 2018-01-01 RX ADMIN — HEPARIN SODIUM 15 UNITS/KG/HR: 10000 INJECTION, SOLUTION INTRAVENOUS at 08:03

## 2018-01-01 RX ADMIN — DIPHENHYDRAMINE HYDROCHLORIDE 12.5 MG: 50 INJECTION, SOLUTION INTRAMUSCULAR; INTRAVENOUS at 08:08

## 2018-01-01 RX ADMIN — DRONABINOL 5 MG: 2.5 CAPSULE ORAL at 02:09

## 2018-01-01 RX ADMIN — PANTOPRAZOLE SODIUM 40 MG: 40 INJECTION, POWDER, FOR SOLUTION INTRAVENOUS at 01:01

## 2018-01-01 RX ADMIN — PROPOFOL 75 MCG/KG/MIN: 10 INJECTION, EMULSION INTRAVENOUS at 03:03

## 2018-01-01 RX ADMIN — POTASSIUM CHLORIDE 50 MEQ: 750 CAPSULE, EXTENDED RELEASE ORAL at 10:01

## 2018-01-01 RX ADMIN — FUROSEMIDE 20 MG: 20 TABLET ORAL at 11:08

## 2018-01-01 RX ADMIN — AMLODIPINE BESYLATE 10 MG: 10 TABLET ORAL at 09:01

## 2018-01-01 RX ADMIN — POTASSIUM CHLORIDE 60 MEQ: 1500 TABLET, EXTENDED RELEASE ORAL at 10:01

## 2018-01-01 RX ADMIN — AMIODARONE HYDROCHLORIDE 400 MG: 200 TABLET ORAL at 11:06

## 2018-01-01 RX ADMIN — MUPIROCIN: 20 OINTMENT TOPICAL at 03:11

## 2018-01-01 RX ADMIN — WARFARIN SODIUM 2 MG: 1 TABLET ORAL at 05:10

## 2018-01-01 RX ADMIN — POTASSIUM CHLORIDE 10 MEQ: 10 INJECTION, SOLUTION INTRAVENOUS at 02:03

## 2018-01-01 RX ADMIN — Medication 400 MG: at 08:06

## 2018-01-01 RX ADMIN — SODIUM CHLORIDE 250 MG: 9 INJECTION, SOLUTION INTRAVENOUS at 10:08

## 2018-01-01 RX ADMIN — VANCOMYCIN HYDROCHLORIDE 750 MG: 750 INJECTION, POWDER, LYOPHILIZED, FOR SOLUTION INTRAVENOUS at 08:01

## 2018-01-01 RX ADMIN — MIDAZOLAM HYDROCHLORIDE 1 MG: 1 INJECTION, SOLUTION INTRAMUSCULAR; INTRAVENOUS at 09:03

## 2018-01-01 RX ADMIN — ETOMIDATE 6 MG: 2 INJECTION, SOLUTION INTRAVENOUS at 02:11

## 2018-01-01 RX ADMIN — ROCURONIUM BROMIDE 10 MG: 10 INJECTION, SOLUTION INTRAVENOUS at 08:01

## 2018-01-01 RX ADMIN — FUROSEMIDE 40 MG: 10 INJECTION, SOLUTION INTRAMUSCULAR; INTRAVENOUS at 09:12

## 2018-01-01 RX ADMIN — FERUMOXYTOL 510 MG: 510 INJECTION INTRAVENOUS at 11:03

## 2018-01-01 RX ADMIN — WARFARIN SODIUM 2.5 MG: 2.5 TABLET ORAL at 05:01

## 2018-01-01 RX ADMIN — Medication 10 ML: at 10:03

## 2018-01-01 RX ADMIN — SPIRONOLACTONE 25 MG: 25 TABLET, FILM COATED ORAL at 08:09

## 2018-01-01 RX ADMIN — DRONABINOL 5 MG: 2.5 CAPSULE ORAL at 10:11

## 2018-01-01 RX ADMIN — POTASSIUM CHLORIDE 10 MEQ: 750 CAPSULE, EXTENDED RELEASE ORAL at 03:01

## 2018-01-01 RX ADMIN — LISINOPRIL 5 MG: 5 TABLET ORAL at 05:08

## 2018-01-01 RX ADMIN — WARFARIN SODIUM 1.5 MG: 1 TABLET ORAL at 04:10

## 2018-01-01 RX ADMIN — WARFARIN SODIUM 2.5 MG: 2.5 TABLET ORAL at 04:03

## 2018-01-01 RX ADMIN — Medication 10 ML: at 04:03

## 2018-01-01 RX ADMIN — SODIUM CHLORIDE, SODIUM GLUCONATE, SODIUM ACETATE, POTASSIUM CHLORIDE, MAGNESIUM CHLORIDE, SODIUM PHOSPHATE, DIBASIC, AND POTASSIUM PHOSPHATE: .53; .5; .37; .037; .03; .012; .00082 INJECTION, SOLUTION INTRAVENOUS at 11:08

## 2018-01-01 RX ADMIN — CYCLOBENZAPRINE HYDROCHLORIDE 5 MG: 5 TABLET, FILM COATED ORAL at 08:11

## 2018-01-01 RX ADMIN — HYDRALAZINE HYDROCHLORIDE 15 MG: 20 INJECTION INTRAMUSCULAR; INTRAVENOUS at 09:10

## 2018-01-01 RX ADMIN — MUPIROCIN 1 G: 20 OINTMENT TOPICAL at 09:01

## 2018-01-01 RX ADMIN — PANTOPRAZOLE SODIUM 40 MG: 40 TABLET, DELAYED RELEASE ORAL at 10:11

## 2018-01-01 RX ADMIN — Medication 3 ML: at 11:01

## 2018-01-01 RX ADMIN — ETOMIDATE 2 MG: 2 INJECTION, SOLUTION INTRAVENOUS at 02:10

## 2018-01-01 RX ADMIN — Medication 400 MG: at 12:09

## 2018-01-02 NOTE — PROGRESS NOTES
Verbal result taken from [facility] _________. PT/INR _1.4______ Date drawn_01/02//18_______ Hardcopy to be faxed.

## 2018-01-02 NOTE — PROGRESS NOTES
INR subtherapeutic on d/c dose. Wife confirms no changes. Advised heparin admission today, 4mg of coumadin if not admitted by 5pm

## 2018-01-03 PROBLEM — E78.5 HYPERLIPIDEMIA: Status: RESOLVED | Noted: 2017-07-05 | Resolved: 2018-01-01

## 2018-01-03 NOTE — PROGRESS NOTES
Admit Note     Met with patient and wife to assess needs. Patient is a 67 y.o.  male, admitted for low INR, per medical record.  Pt has an LVAD, implanted in 7/2017.    Patient admitted from home on 1/2/2018.  At this time, patient presents as alert and oriented x 4, calm and cooperative.  Pt resting in bed with eyes close and not participating in most of conversation today.  At this time, patients caregiver presents as alert and oriented x 4, communicative, cooperative and asking and answering questions appropriately.      Household/Family Systems     Patient currently resides with patient's daughter, at:    [address], [address]  [place] [address]    Pt's permanent address is:    [address] [place] [address] [place] [address]     Pt has been staying with his tr recently for additional support.  Per report from [facility], pt's wife comes to [facility]'s home to check in on pt and to transport him to medical appts.    Home: [phone]  Pt cell:  [phone]  Kia Hayden (wife): [phone]  Rayna (dgtr): [phone]  Boni Hayden (son): [phone]  Artie Hayden (nephew, lives in [place]): [phone]    Support system includes spouse, adult children, extended family, many friends, and Spiritism community.  Patient does not have dependents that are need of being cared for.     Patients primary caregiver is Kia Hayden, patients wife, and tr Rayna.  During admission, patient's wife plans to stay in patient's room.  Confirmed patient and patients caregivers do have access to reliable transportation.    Cognitive Status/Learning     Patient reports reading ability as 9th grade and states patient does not have difficulty with seeing, hearing, comprehension, learning and memory. Pt does report difficulty with reading and writing. Pt's wife assists with reading and writing.  Patient reports patient learns best by one-on-one.    Needed: No.   Highest education level: High School (9-12) or  GED    Vocation/Disability     Working for Income: No  If no, reason not working: Patient Choice - Retired    Patient is retired from the [employer] in .  Pt was a .  Pt's wife was working as a PCA until 2017, however she now cares for pt.     Adherence     Patient reports a high level of adherence to patients health care regimen.  Adherence counseling and education provided. Patient verbalizes understanding.    Substance Use    Patient reports the following substance usage.    Tobacco: none, patient denies any use.  Alcohol: none, patient denies any use.  Illicit Drugs/Non-prescribed Medications: none, patient denies any use.  Patient states clear understanding of the potential impact of substance use.  Substance abstinence/cessation counseling, education and resources provided and reviewed.     Services Utilizing/ADLS    Infusion Service: Prior to admission, patient utilizing? no, pt has used [facility] in the past  Home Health: Prior to admission, patient utilizing? yes, pt has [facility] (ph: [phone], f: [phone]) for skilled nrsg visits.  Pt requesting to resume with [facility] at d/c.  DME: Prior to admission, yes, [place] and w/c  Pulmonary/Cardiac Rehab: Prior to admission, no  Dialysis:  Prior to admission, no  Transplant Specialty Pharmacy:  Prior to admission, n/a    Prior to admission, patient reports patient was mostly independent with ADLS and was not driving. Pt's wife drives. Patient reports patient is able to care for self at this time.  Patient indicates a willingness to care for self once medically cleared to do so.    Insurance/Medications    Insured by   Payor/Plan Subscr  Sex Relation Sub. Ins. ID Effective Group Num   1. MEDICARE - ME* MIRTA LOPEZ 1950 Male  [id number] 6/1/15                                    [address]   2. BLUE CROSS BL* MIRTA LOPEZ 1950 Male  [id number] 1/[birth year] [id number]                                    [address] [address]      Primary Insurance (for UNOS reporting): Public Insurance - Medicare FFS (Fee For Service)  Secondary Insurance (for UNOS reporting): Private Insurance    Patient reports patient is able to obtain and afford medications at this time and at time of discharge.    Living Will/Healthcare Power of     Patient states patient does not have a LW and/or HCPA.   provided education regarding LW and HCPA and the completion of forms.    Coping/Mental Health    Patient is coping well with the aid of  family members.  Patient denies mental health difficulties.     Discharge Planning    At time of discharge, patient plans to return to [facility]'s home under the care of wife and daughter.  Patients wife will transport patient.  Per rounds today, expected discharge date has not been medically determined at this time.  Patient and patients caregiver verbalize understanding and are involved in treatment planning and discharge process.    Additional Concerns    Patient and caregiver verbalize understanding and agreement with information reviewed,  availability, and how to access available resources as needed.  Patient and caregiver deny additional needs or concerns at this time.  Patient is being followed for needs, education, resources, information, emotional support, supportive counseling, and for supportive and skilled discharge plan of care.  providing ongoing psychosocial support, education, resources and d/c planning as needed.  SW remains available.

## 2018-01-03 NOTE — ASSESSMENT & PLAN NOTE
"-Recent "NSAID induced ulcer" per EGD.   -H/H stable from DC.   -Continue BID PPI .  -Patient not tolerant of oral iron per report. Will try IV iron today.    "

## 2018-01-03 NOTE — PLAN OF CARE
Problem: Patient Care Overview  Goal: Plan of Care Review  Outcome: Ongoing (interventions implemented as appropriate)  Patient progressing toward all goals. Plan of care discussed with patient, no questions at this time. Patient ambulating independently, fall precautions in place.Heaprin infusing; VS & LVAD numbers WNL. Will monitor.

## 2018-01-03 NOTE — SUBJECTIVE & OBJECTIVE
Past Medical History:   Diagnosis Date    Cardiomyopathy     Hyperlipidemia     Hypertension     Obesity     S/P implantation of automatic cardioverter/defibrillator (AICD)     Ventricular tachycardia        Past Surgical History:   Procedure Laterality Date    CARDIAC CATHETERIZATION      CARDIAC DEFIBRILLATOR PLACEMENT      LEFT VENTRICULAR ASSIST DEVICE  07/27/2017       Review of patient's allergies indicates:  No Known Allergies    No current facility-administered medications on file prior to encounter.      Current Outpatient Prescriptions on File Prior to Encounter   Medication Sig    amLODIPine (NORVASC) 10 MG tablet Take 1 tablet (10 mg total) by mouth once daily.    dronabinol (MARINOL) 5 MG capsule Take 1 capsule (5 mg total) by mouth 3 (three) times daily before meals.    furosemide (LASIX) 40 MG tablet Take 1 tablet (40 mg total) by mouth 2 (two) times daily.    hydrALAZINE (APRESOLINE) 50 MG tablet Take 1 tablet (50 mg total) by mouth every 8 (eight) hours.    magnesium oxide (MAG-OX) 400 mg tablet Take 1 tablet (400 mg total) by mouth 2 (two) times daily.    pantoprazole (PROTONIX) 40 MG tablet Take 1 tablet (40 mg total) by mouth 2 (two) times daily before meals.    polyethylene glycol (GLYCOLAX) 17 gram/dose powder Take 17 g by mouth daily as needed.    potassium chloride SA (K-DUR,KLOR-CON) 20 MEQ tablet Take 2 tablets (40 mEq total) by mouth once daily.    pravastatin (PRAVACHOL) 20 MG tablet Take 1 tablet (20 mg total) by mouth every evening.    warfarin (COUMADIN) 2 MG tablet 4mg 12/29 only, followed by weekly dose of 3mg/day, except 2mg on Saturdays     Family History     Problem Relation (Age of Onset)    Heart disease Mother, Father        Social History Main Topics    Smoking status: Never Smoker    Smokeless tobacco: Never Used    Alcohol use No    Drug use: Unknown    Sexual activity: Not on file     Review of Systems   Constitution: Negative.   HENT: Negative.     Cardiovascular: Negative.    Respiratory: Negative.    Skin: Negative.    Musculoskeletal: Negative.      Objective:     Vital Signs (Most Recent):  Temp: 98.1 °F (36.7 °C) (01/03/18 0000)  Pulse: 79 (01/03/18 0000)  Resp: 18 (01/03/18 0000)  BP: (!) 78/0 (01/03/18 0006)  SpO2: 97 % (01/03/18 0000) Vital Signs (24h Range):  Temp:  [98.1 °F (36.7 °C)] 98.1 °F (36.7 °C)  Pulse:  [77-79] 79  Resp:  [16-18] 18  SpO2:  [97 %] 97 %  BP: ()/(0-72) 78/0     Weight: 66.6 kg (146 lb 13.2 oz)  Body mass index is 21.68 kg/m².    SpO2: 97 %  O2 Device (Oxygen Therapy): room air      Intake/Output Summary (Last 24 hours) at 01/03/18 0024  Last data filed at 01/03/18 0000   Gross per 24 hour   Intake                0 ml   Output              750 ml   Net             -750 ml       Lines/Drains/Airways     Line                 VAD 07/27/17 1312 Left ventricular assist device HeartMate 3 159 days          Pressure Ulcer                 Pressure Ulcer 12/12/17 1000 upper coccyx Stage II 21 days          Peripheral Intravenous Line                 Peripheral IV - Single Lumen 01/02/18 2258 Right Forearm less than 1 day                Physical Exam   Constitutional: He is oriented to person, place, and time. He appears well-developed and well-nourished.   HENT:   Head: Normocephalic and atraumatic.   Mouth/Throat: Oropharynx is clear and moist.   Eyes: EOM are normal. Pupils are equal, round, and reactive to light.   Neck: Normal range of motion. Neck supple. No JVD present. No tracheal deviation present. No thyromegaly present.   Cardiovascular:   LVAD hum positive and smooth    Pulmonary/Chest: Effort normal and breath sounds normal. No stridor. No respiratory distress. He has no rales. He exhibits no tenderness.   Abdominal: Soft. Bowel sounds are normal. He exhibits no distension and no mass. There is no tenderness. There is no rebound and no guarding.   Musculoskeletal: Normal range of motion.   Lymphadenopathy:     He has  no cervical adenopathy.   Neurological: He is alert and oriented to person, place, and time. He has normal reflexes. He displays normal reflexes. No cranial nerve deficit. He exhibits normal muscle tone. Coordination normal.   Skin: Skin is warm and dry.   Psychiatric: He has a normal mood and affect. Judgment normal.   Nursing note and vitals reviewed.      Significant Labs:   CMP No results for input(s): NA, K, CL, CO2, GLU, BUN, CREATININE, CALCIUM, PROT, ALBUMIN, BILITOT, ALKPHOS, AST, ALT, ANIONGAP, ESTGFRAFRICA, EGFRNONAA in the last 48 hours., CBC   Recent Labs  Lab 01/02/18   WBC 5.5   HGB 7.8*   HCT 27*       and INR   Recent Labs  Lab 01/02/18   INR 1.4  1.4       Significant Imaging: X-Ray: CXR: X-Ray Chest 1 View (CXR): No results found for this visit on 01/02/18.

## 2018-01-03 NOTE — HPI
Mr Hayden is a 67 YOAAM with a PMHx of NICM (EF 10%) s/p HeartMate 3 Implanted 7/27/2017 as DT, HTN, DLD, recent ICD revision on 11/20/17 with Dr. Vidal (DC ICD placed with active RA/LV leads (RV lead capped during revision) that was complicated by pocket hematoma and recent GIB bleed due to ileal (NSAID induced) ulcer requiring PRBC transfusion and EGD for which he was recently discharged on 12/22/17. ASA stopped at time and PPI increased to BID at time of discharge on 12/29/2017.     Pt found his INR to be at 1.4 today, and was therefore advised to be admitted for heparin bridge.   He denied any symptoms suggestive of heart failure exacerbation, symptoms of GI bleed.

## 2018-01-03 NOTE — ASSESSMENT & PLAN NOTE
- HnH with latest Hgb of 7.8 g - at baseline.   - Asymptomatic; no need for transfusion.   - Will monitor closely.   - Low Iron as of 12/15/2017  - To check TIBC and start oral Iron,  ( Can consider IV Iron therapy for a few days)

## 2018-01-03 NOTE — ASSESSMENT & PLAN NOTE
- S/P HM 3 with speed at 5200 rpm  - No LVAD alarms.   - LDH at 169 which is baseline.   - INR at subtherapeutic which is being bridged with Heparindrip.

## 2018-01-03 NOTE — ASSESSMENT & PLAN NOTE
-HM3 implanted 7/2017 as DT. Speed 5200.  -ASA recently stopped due to NSAID induced ulcer and major GI bleed.  -INR goal of 2-3. INR 1.4 today. UFH/warfarin as above.   -Intermittently pulsatile.  -MAP goal of 60-80.  -Continue Amlodipine 10mg and hydralazine 50mg.  -Lasix 40 mg BID.

## 2018-01-03 NOTE — SUBJECTIVE & OBJECTIVE
Interval History: No complaints this am. Asking for Boost supplements.     Continuous Infusions:   heparin (porcine) in D5W 17 Units/kg/hr (01/03/18 0008)     Scheduled Meds:   amLODIPine  10 mg Oral Daily    dronabinol  5 mg Oral TID AC    ferric gluconate (FERRLECIT) IVPB  125 mg Intravenous Once    furosemide  40 mg Oral BID    hydrALAZINE  50 mg Oral Q8H    magnesium oxide  400 mg Oral BID    pantoprazole  40 mg Oral BID AC    polyethylene glycol  17 g Oral Daily    potassium chloride SA  60 mEq Oral Daily    pravastatin  20 mg Oral QHS    sodium chloride 0.9%  3 mL Intravenous Q8H    warfarin  3 mg Oral Daily     PRN Meds:    Review of patient's allergies indicates:  No Known Allergies  Objective:     Vital Signs (Most Recent):  Temp: 97.2 °F (36.2 °C) (01/03/18 0750)  Pulse: 80 (01/03/18 0750)  Resp: 18 (01/03/18 0750)  BP: 98/66 (01/03/18 0750)  SpO2: 98 % (01/03/18 0750) Vital Signs (24h Range):  Temp:  [97.2 °F (36.2 °C)-99.1 °F (37.3 °C)] 97.2 °F (36.2 °C)  Pulse:  [77-86] 80  Resp:  [16-18] 18  SpO2:  [97 %-98 %] 98 %  BP: ()/(0-72) 98/66     Patient Vitals for the past 72 hrs (Last 3 readings):   Weight   01/02/18 2208 66.6 kg (146 lb 13.2 oz)     Body mass index is 21.68 kg/m².      Intake/Output Summary (Last 24 hours) at 01/03/18 0829  Last data filed at 01/03/18 0400   Gross per 24 hour   Intake                0 ml   Output             1600 ml   Net            -1600 ml       Hemodynamic Parameters:       Telemetry: nsr    Physical Exam   Constitutional: He is oriented to person, place, and time. He appears well-developed and well-nourished.   HENT:   Head: Normocephalic.   Eyes: Pupils are equal, round, and reactive to light.   Neck: Normal range of motion. Neck supple.   Cardiovascular: Normal rate and regular rhythm.    VAD hum.   Pulmonary/Chest: Effort normal and breath sounds normal.   Abdominal: Soft. Bowel sounds are normal.   Musculoskeletal: Normal range of motion.    Neurological: He is alert and oriented to person, place, and time.   Skin: Skin is warm and dry.   Psychiatric: He has a normal mood and affect. His behavior is normal.   Nursing note and vitals reviewed.      Significant Labs:  CBC:    Recent Labs  Lab 12/29/17 0626 01/02/18 01/02/18 2352 01/03/18  0608   WBC 5.79 5.5 5.12  5.12 5.26   RBC 2.65*  --  2.69*  2.69* 2.71*   HGB 7.0* 7.8* 6.9*  6.9* 6.9*   HCT 22.7* 27* 22.0*  22.0* 22.3*    241 206  206 208   MCV 86  --  82  82 82   MCH 26.4*  --  25.7*  25.7* 25.5*   MCHC 30.8*  --  31.4*  31.4* 30.9*     BNP:    Recent Labs  Lab 12/29/17 0626   *     CMP:    Recent Labs  Lab 12/29/17 0626 01/02/18 2352 01/03/18  0608   GLU 77 93 72   CALCIUM 8.8 9.3 9.1   ALBUMIN  --  2.9*  --    PROT  --  6.3  --     138 139   K 3.4* 3.4* 3.5   CO2 29 29 28    101 102   BUN 12 23 20   CREATININE 0.8 1.0 0.9   ALKPHOS  --  61  --    ALT  --  10  --    AST  --  17  --    BILITOT  --  0.3  --       Coagulation:     Recent Labs  Lab 01/02/18 01/03/18  0758   INR 1.4  1.4 1.4*     LDH:  No results for input(s): LDH in the last 72 hours.  Microbiology:  Microbiology Results (last 7 days)     ** No results found for the last 168 hours. **        I have reviewed all pertinent labs within the past 24 hours.    Estimated Creatinine Clearance: 75 mL/min (based on SCr of 0.9 mg/dL).    Diagnostic Results:  I have reviewed all pertinent imaging results/findings within the past 24 hours.

## 2018-01-03 NOTE — PLAN OF CARE
Problem: Patient Care Overview  Goal: Plan of Care Review  Outcome: Ongoing (interventions implemented as appropriate)  Patient remains free of falls or injury. Patient denies pain. Admitted for subtherapeutic INR; current INR 1.4. Started on heparin drip as coumadin bridge. Plan of care reviewed with patient and wife.

## 2018-01-03 NOTE — PROGRESS NOTES
Patient arrived to floor in wheelchair from parking garage per wife. Patient placed on CSU telemetry. Patient is AAO. VSS, NAD noted, Patient oriented to room and floor, CSU orders implemented. Wife at bedside. Patient in bed, bed in lowest locked position, side rails up x2, call bell in reach.

## 2018-01-03 NOTE — ASSESSMENT & PLAN NOTE
- S/P LVAD   - Continue unloading medication of Amlodipine 10 mg, Hydralazine 50 mg TID   - Monitor vitals.   - No e/o volume overload.

## 2018-01-03 NOTE — H&P
[facility]  Cardiology  History and Physical     Patient Name: Suman Hayden  MRN: [id number]  Admission Date: 1/2/2018  Code Status: Full Code   Attending Provider: Mohit Ambrosio MD   Primary Care Physician: Joe Ernst MD  Principal Problem:Subtherapeutic international normalized ratio (INR)    Patient information was obtained from patient, past medical records and ER records.     Subjective:     Chief Complaint:  Subtherapeutic INR     HPI:  Mr Hayden is a 67 YOAAM with a PMHx of NICM (EF 10%) s/p HeartMate 3 Implanted 7/27/2017 as DT, HTN, DLD, recent ICD revision on 11/20/17 with Dr. Vidal (DC ICD placed with active RA/LV leads (RV lead capped during revision) that was complicated by pocket hematoma and recent GIB bleed due to ileal (NSAID induced) ulcer requiring PRBC transfusion and EGD for which he was recently discharged on 12/22/17. ASA stopped at time and PPI increased to BID at time of discharge on 12/29/2017.     Pt found his INR to be at 1.4 today, and was therefore advised to be admitted for heparin bridge.   He denied any symptoms suggestive of heart failure exacerbation, symptoms of GI bleed.     Past Medical History:   Diagnosis Date    Cardiomyopathy     Hyperlipidemia     Hypertension     Obesity     S/P implantation of automatic cardioverter/defibrillator (AICD)     Ventricular tachycardia        Past Surgical History:   Procedure Laterality Date    CARDIAC CATHETERIZATION      CARDIAC DEFIBRILLATOR PLACEMENT      LEFT VENTRICULAR ASSIST DEVICE  07/27/2017       Review of patient's allergies indicates:  No Known Allergies    No current facility-administered medications on file prior to encounter.      Current Outpatient Prescriptions on File Prior to Encounter   Medication Sig    amLODIPine (NORVASC) 10 MG tablet Take 1 tablet (10 mg total) by mouth once daily.    dronabinol (MARINOL) 5 MG capsule Take 1 capsule (5 mg total) by mouth 3 (three) times daily  before meals.    furosemide (LASIX) 40 MG tablet Take 1 tablet (40 mg total) by mouth 2 (two) times daily.    hydrALAZINE (APRESOLINE) 50 MG tablet Take 1 tablet (50 mg total) by mouth every 8 (eight) hours.    magnesium oxide (MAG-OX) 400 mg tablet Take 1 tablet (400 mg total) by mouth 2 (two) times daily.    pantoprazole (PROTONIX) 40 MG tablet Take 1 tablet (40 mg total) by mouth 2 (two) times daily before meals.    polyethylene glycol (GLYCOLAX) 17 gram/dose powder Take 17 g by mouth daily as needed.    potassium chloride SA (K-DUR,KLOR-CON) 20 MEQ tablet Take 2 tablets (40 mEq total) by mouth once daily.    pravastatin (PRAVACHOL) 20 MG tablet Take 1 tablet (20 mg total) by mouth every evening.    warfarin (COUMADIN) 2 MG tablet 4mg 12/29 only, followed by weekly dose of 3mg/day, except 2mg on Saturdays     Family History     Problem Relation (Age of Onset)    Heart disease Mother, Father        Social History Main Topics    Smoking status: Never Smoker    Smokeless tobacco: Never Used    Alcohol use No    Drug use: Unknown    Sexual activity: Not on file     Review of Systems   Constitution: Negative.   HENT: Negative.    Cardiovascular: Negative.    Respiratory: Negative.    Skin: Negative.    Musculoskeletal: Negative.      Objective:     Vital Signs (Most Recent):  Temp: 98.1 °F (36.7 °C) (01/03/18 0000)  Pulse: 79 (01/03/18 0000)  Resp: 18 (01/03/18 0000)  BP: (!) 78/0 (01/03/18 0006)  SpO2: 97 % (01/03/18 0000) Vital Signs (24h Range):  Temp:  [98.1 °F (36.7 °C)] 98.1 °F (36.7 °C)  Pulse:  [77-79] 79  Resp:  [16-18] 18  SpO2:  [97 %] 97 %  BP: ()/(0-72) 78/0     Weight: 66.6 kg (146 lb 13.2 oz)  Body mass index is 21.68 kg/m².    SpO2: 97 %  O2 Device (Oxygen Therapy): room air      Intake/Output Summary (Last 24 hours) at 01/03/18 0024  Last data filed at 01/03/18 0000   Gross per 24 hour   Intake                0 ml   Output              750 ml   Net             -750 ml        Lines/Drains/Airways     Line                 VAD 07/27/17 1312 Left ventricular assist device HeartMate 3 159 days          Pressure Ulcer                 Pressure Ulcer 12/12/17 1000 upper coccyx Stage II 21 days          Peripheral Intravenous Line                 Peripheral IV - Single Lumen 01/02/18 2259 Right Forearm less than 1 day                Physical Exam   Constitutional: He is oriented to person, place, and time. He appears well-developed and well-nourished.   HENT:   Head: Normocephalic and atraumatic.   Mouth/Throat: Oropharynx is clear and moist.   Eyes: EOM are normal. Pupils are equal, round, and reactive to light.   Neck: Normal range of motion. Neck supple. No JVD present. No tracheal deviation present. No thyromegaly present.   Cardiovascular:   LVAD hum positive and smooth    Pulmonary/Chest: Effort normal and breath sounds normal. No stridor. No respiratory distress. He has no rales. He exhibits no tenderness.   Abdominal: Soft. Bowel sounds are normal. He exhibits no distension and no mass. There is no tenderness. There is no rebound and no guarding.   Musculoskeletal: Normal range of motion.   Lymphadenopathy:     He has no cervical adenopathy.   Neurological: He is alert and oriented to person, place, and time. He has normal reflexes. He displays normal reflexes. No cranial nerve deficit. He exhibits normal muscle tone. Coordination normal.   Skin: Skin is warm and dry.   Psychiatric: He has a normal mood and affect. Judgment normal.   Nursing note and vitals reviewed.      Significant Labs:   CMP No results for input(s): NA, K, CL, CO2, GLU, BUN, CREATININE, CALCIUM, PROT, ALBUMIN, BILITOT, ALKPHOS, AST, ALT, ANIONGAP, ESTGFRAFRICA, EGFRNONAA in the last 48 hours., CBC   Recent Labs  Lab 01/02/18   WBC 5.5   HGB 7.8*   HCT 27*       and INR   Recent Labs  Lab 01/02/18   INR 1.4  1.4       Significant Imaging: X-Ray: CXR: X-Ray Chest 1 View (CXR): No results found for this  visit on 01/02/18.    Assessment and Plan:     * Subtherapeutic international normalized ratio (INR)    - Pt has been on coumadin for LVAD  - Last regimen was as follows,    - Friday 4 mg    - Saturday 2 mg    - Sunday 3 mg    - Monday 3 mg    - Tuesday 3 mg - Took today   - Will continue with the home regimen for now, and to start heparin drip for bridging purpose.           Normocytic anemia    - HnH with latest Hgb of 7.8 g - at baseline.   - Asymptomatic; no need for transfusion.   - Will monitor closely.   - Low Iron as of 12/15/2017  - To check TIBC and start oral Iron,  ( Can consider IV Iron therapy for a few days)         Atrial fibrillation    - CHADSVASC 3  - Currently rate controlled  - Continue anticoagulation as above.         LVAD (left ventricular assist device) present    - S/P HM 3 with speed at 5200 rpm  - No LVAD alarms.   - LDH at 169 which is baseline.   - INR at subtherapeutic which is being bridged with Heparindrip.         Nonischemic cardiomyopathy    - S/P LVAD   - Continue unloading medication of Amlodipine 10 mg, Hydralazine 50 mg TID   - Monitor vitals.   - No e/o volume overload.             VTE Risk Mitigation         Ordered     warfarin (COUMADIN) tablet 3 mg  Daily     Route:  Oral        01/02/18 2317     heparin 25,000 units in dextrose 5% 250 mL (100 units/mL) infusion  Continuous     Route:  Intravenous        01/02/18 7908          Edwin Cardozo MD  Cardiology   [facility]

## 2018-01-03 NOTE — ASSESSMENT & PLAN NOTE
- Pt has been on coumadin for LVAD  - Last regimen was as follows,    - Friday 4 mg    - Saturday 2 mg    - Sunday 3 mg    - Monday 3 mg    - Tuesday 3 mg - Took today   - Will continue with the home regimen for now, and to start heparin drip for bridging purpose.

## 2018-01-03 NOTE — PROGRESS NOTES
[facility]  Heart Transplant  Progress Note    Patient Name: Suman Hayden  MRN: [id number]  Admission Date: 1/2/2018  Hospital Length of Stay: 1 days  Attending Physician: Mohit Ambrosio MD  Primary Care Provider: Joe Ernst MD  Principal Problem:Subtherapeutic international normalized ratio (INR)    Subjective:     Interval History: No complaints this am. Asking for Boost supplements.     Continuous Infusions:   heparin (porcine) in D5W 17 Units/kg/hr (01/03/18 0008)     Scheduled Meds:   amLODIPine  10 mg Oral Daily    dronabinol  5 mg Oral TID AC    ferric gluconate (FERRLECIT) IVPB  125 mg Intravenous Once    furosemide  40 mg Oral BID    hydrALAZINE  50 mg Oral Q8H    magnesium oxide  400 mg Oral BID    pantoprazole  40 mg Oral BID AC    polyethylene glycol  17 g Oral Daily    potassium chloride SA  60 mEq Oral Daily    pravastatin  20 mg Oral QHS    sodium chloride 0.9%  3 mL Intravenous Q8H    warfarin  3 mg Oral Daily     PRN Meds:    Review of patient's allergies indicates:  No Known Allergies  Objective:     Vital Signs (Most Recent):  Temp: 97.2 °F (36.2 °C) (01/03/18 0750)  Pulse: 80 (01/03/18 0750)  Resp: 18 (01/03/18 0750)  BP: 98/66 (01/03/18 0750)  SpO2: 98 % (01/03/18 0750) Vital Signs (24h Range):  Temp:  [97.2 °F (36.2 °C)-99.1 °F (37.3 °C)] 97.2 °F (36.2 °C)  Pulse:  [77-86] 80  Resp:  [16-18] 18  SpO2:  [97 %-98 %] 98 %  BP: ()/(0-72) 98/66     Patient Vitals for the past 72 hrs (Last 3 readings):   Weight   01/02/18 2208 66.6 kg (146 lb 13.2 oz)     Body mass index is 21.68 kg/m².      Intake/Output Summary (Last 24 hours) at 01/03/18 0829  Last data filed at 01/03/18 0400   Gross per 24 hour   Intake                0 ml   Output             1600 ml   Net            -1600 ml       Hemodynamic Parameters:       Telemetry: nsr    Physical Exam   Constitutional: He is oriented to person, place, and time. He appears well-developed and well-nourished.    HENT:   Head: Normocephalic.   Eyes: Pupils are equal, round, and reactive to light.   Neck: Normal range of motion. Neck supple.   Cardiovascular: Normal rate and regular rhythm.    VAD hum.   Pulmonary/Chest: Effort normal and breath sounds normal.   Abdominal: Soft. Bowel sounds are normal.   Musculoskeletal: Normal range of motion.   Neurological: He is alert and oriented to person, place, and time.   Skin: Skin is warm and dry.   Psychiatric: He has a normal mood and affect. His behavior is normal.   Nursing note and vitals reviewed.      Significant Labs:  CBC:    Recent Labs  Lab 12/29/17 0626 01/02/18 01/02/18 2352 01/03/18  0608   WBC 5.79 5.5 5.12  5.12 5.26   RBC 2.65*  --  2.69*  2.69* 2.71*   HGB 7.0* 7.8* 6.9*  6.9* 6.9*   HCT 22.7* 27* 22.0*  22.0* 22.3*    241 206  206 208   MCV 86  --  82  82 82   MCH 26.4*  --  25.7*  25.7* 25.5*   MCHC 30.8*  --  31.4*  31.4* 30.9*     BNP:    Recent Labs  Lab 12/29/17  0626   *     CMP:    Recent Labs  Lab 12/29/17 0626 01/02/18 2352 01/03/18  0608   GLU 77 93 72   CALCIUM 8.8 9.3 9.1   ALBUMIN  --  2.9*  --    PROT  --  6.3  --     138 139   K 3.4* 3.4* 3.5   CO2 29 29 28    101 102   BUN 12 23 20   CREATININE 0.8 1.0 0.9   ALKPHOS  --  61  --    ALT  --  10  --    AST  --  17  --    BILITOT  --  0.3  --       Coagulation:     Recent Labs  Lab 01/02/18 01/03/18  0758   INR 1.4  1.4 1.4*     LDH:  No results for input(s): LDH in the last 72 hours.  Microbiology:  Microbiology Results (last 7 days)     ** No results found for the last 168 hours. **        I have reviewed all pertinent labs within the past 24 hours.    Estimated Creatinine Clearance: 75 mL/min (based on SCr of 0.9 mg/dL).    Diagnostic Results:  I have reviewed all pertinent imaging results/findings within the past 24 hours.    Assessment and Plan:     No notes on file    * Subtherapeutic international normalized ratio (INR)    -Continue UFH bridge/warfarin.  "          LVAD (left ventricular assist device) present    -HM3 implanted 7/2017 as DT. Speed 5200.  -ASA recently stopped due to NSAID induced ulcer and major GI bleed.  -INR goal of 2-3. INR 1.4 today. UFH/warfarin as above.   -Intermittently pulsatile.  -MAP goal of 60-80.  -Continue Amlodipine 10mg and hydralazine 50mg.  -Lasix 40 mg BID.        Normocytic anemia    -Recent "NSAID induced ulcer" per EGD.   -H/H stable from DC.   -Continue BID PPI .  -Patient not tolerant of oral iron per report. Will try IV iron today.              Kishore Paz, NP  Heart Transplant  [facility]  "

## 2018-01-04 NOTE — PROGRESS NOTES
Doppler pressure 62; unable to obtain cuff pressure on patient. Patient asymptomatic. 50 mg PO hydralazine scheduled for this AM; Dr. Cardozo notified, OK to hold AM hydralazine.

## 2018-01-04 NOTE — ASSESSMENT & PLAN NOTE
-HM3 implanted 7/2017 as DT. Speed 5200.  -ASA recently stopped due to NSAID induced ulcer and major GI bleed.  -INR goal of 2-3. INR 1.3 today. UFH/warfarin as above. Will boost coumadin today.   - home dose of coumadin 3 T/Th/Sat, 2 all other days  -Intermittently pulsatile.  -MAP goal of 60-80.  -Continue Amlodipine 10mg. Transition from hydralazine to ACE for renal protection   -Lasix 40 mg BID.

## 2018-01-04 NOTE — PLAN OF CARE
Problem: Patient Care Overview  Goal: Plan of Care Review  Outcome: Revised  Plan of care discussed with patient.  Patient ambulating independently, fall precautions in place. LVAD DP and numbers WNL, smooth LVAD hum. Patient has no complaints of pain. Discussed medications and care. Patient has no questions at this time. Will continue to monitor.

## 2018-01-04 NOTE — SUBJECTIVE & OBJECTIVE
Subjective:     Post-Op Info:  * No surgery found *          Reason for Visit:  Patient is seen in follow up management of low INR    Interval History:  No acute events overnight. D/c today     Medications:  Continuous Infusions:   heparin (porcine) in D5W 19 Units/kg/hr (01/03/18 2159)     Scheduled Meds:   amLODIPine  10 mg Oral Daily    dronabinol  5 mg Oral TID AC    ferric gluconate (FERRLECIT) IVPB  250 mg Intravenous BID    furosemide  40 mg Oral BID    lisinopril  5 mg Oral Daily    magnesium oxide  400 mg Oral BID    pantoprazole  40 mg Oral BID AC    polyethylene glycol  17 g Oral Daily    potassium chloride  60 mEq Oral Daily    pravastatin  20 mg Oral QHS    sodium chloride 0.9%  3 mL Intravenous Q8H    warfarin  6 mg Oral Once     PRN Meds:     Objective:     Vital Signs (Most Recent):  Temp: 98.6 °F (37 °C) (01/04/18 1115)  Pulse: 79 (01/04/18 1200)  Resp: 18 (01/04/18 1115)  BP: (!) 78/0 (01/04/18 1115)  SpO2: 98 % (01/04/18 1115) Vital Signs (24h Range):  Temp:  [69.2 °F (20.7 °C)-99.6 °F (37.6 °C)] 98.6 °F (37 °C)  Pulse:  [] 79  Resp:  [14-18] 18  SpO2:  [97 %-99 %] 98 %  BP: (60-98)/(0-64) 78/0       Intake/Output Summary (Last 24 hours) at 01/04/18 1244  Last data filed at 01/04/18 0756   Gross per 24 hour   Intake              650 ml   Output             2725 ml   Net            -2075 ml       Physical Exam   Constitutional: He is oriented to person, place, and time. He appears well-developed and well-nourished.   Cardiovascular: Normal rate.    VAD sounds smooth    Pulmonary/Chest: Effort normal.   Abdominal: Soft.   Musculoskeletal: Normal range of motion.   Neurological: He is alert and oriented to person, place, and time.   Skin: Skin is warm and dry.   Psychiatric: He has a normal mood and affect.       Significant Labs:  BMP:   Recent Labs  Lab 01/04/18  0628   GLU 81      K 3.6      CO2 30*   BUN 17   CREATININE 0.9   CALCIUM 9.2   MG 2.0     CBC:   Recent  Labs  Lab 01/04/18  0628   WBC 5.34   RBC 2.70*   HGB 6.9*   HCT 22.8*      MCV 84   MCH 25.6*   MCHC 30.3*     Coagulation:   Recent Labs  Lab 01/04/18 0628   INR 1.3*       Procedure: Device Interrogation Including analysis of device parameters  Current Settings: Ventricular Assist Device  Review of device function is stable  TXP LVAD INTERROGATIONS 1/4/2018 1/4/2018 1/4/2018 1/3/2018 1/3/2018 1/3/2018 1/3/2018   Type HeartMate3 - HeartMate3 HeartMate3 HeartMate3 HeartMate3 HeartMate3   Flow 4.1 4.2 4.2 4.1 4.1 4.1 4.2   Speed 5200 5200 5200 5200 5200 5200 5200   PI 3.5 3.7 3.6 4.0 3.7 3.9 3.6   Power ([place]) 3.6 3.6 3.6 3.6 3.5 3.6 3.5   LSL 4800 4800 4800 4800 4800 - -   Pulsatility No Pulse No Pulse No Pulse Pulse No Pulse - -

## 2018-01-04 NOTE — PROCEDURES
Patient AAO with family at bedside. VAD interrogation completed this AM in the event changes needed to be made. Will continue to monitor for further issues.     Pulsatile: Yes   VAD Sounds: HM3  Smooth  Problems / Issues / Alarms with VAD if any: None noted    VAD Interrogation:  TXP LVAD INTERROGATIONS 1/4/2018 1/4/2018 1/4/2018 1/3/2018 1/3/2018 1/3/2018 1/3/2018   Type HeartMate3 - HeartMate3 HeartMate3 HeartMate3 HeartMate3 HeartMate3   Flow 4.1 4.2 4.2 4.1 4.1 4.1 4.2   Speed 5200 5200 5200 5200 5200 5200 5200   PI 3.5 3.7 3.6 4.0 3.7 3.9 3.6   Power ([place]) 3.6 3.6 3.6 3.6 3.5 3.6 3.5   LSL 4800 4800 4800 4800 4800 - -   Pulsatility No Pulse No Pulse No Pulse Pulse No Pulse - -   }

## 2018-01-04 NOTE — PROGRESS NOTES
Patient's BP 77/51, MAP 59, doppler 60; patient asymptomatic. Dr. Cardozo notified; states to monitor patient. No orders given at this time. Will continue to monitor.

## 2018-01-04 NOTE — PLAN OF CARE
Problem: Patient Care Overview  Goal: Plan of Care Review  Outcome: Ongoing (interventions implemented as appropriate)  Patient remains free of falls or injury. Patient denies pain. Admitted for subtherapeutic INR; current INR 1.4. Continued on heparin drip as coumadin bridge. Plan of care reviewed with patient and wife.

## 2018-01-04 NOTE — PROGRESS NOTES
[facility]  Heart Transplant  Progress Note    Patient Name: Suman Hayden  MRN: [id number]  Admission Date: 1/2/2018  Hospital Length of Stay: 2 days  Attending Physician: Mohit Ambrosio MD  Primary Care Provider: Joe Ernst MD  Principal Problem:Subtherapeutic international normalized ratio (INR)    Subjective:     Interval History: No complaints. Feels great.    Continuous Infusions:   heparin (porcine) in D5W 19 Units/kg/hr (01/03/18 2159)     Scheduled Meds:   amLODIPine  10 mg Oral Daily    dronabinol  5 mg Oral TID AC    ferric gluconate (FERRLECIT) IVPB  250 mg Intravenous BID    furosemide  40 mg Oral BID    lisinopril  5 mg Oral Daily    magnesium oxide  400 mg Oral BID    pantoprazole  40 mg Oral BID AC    polyethylene glycol  17 g Oral Daily    potassium chloride  60 mEq Oral Daily    pravastatin  20 mg Oral QHS    sodium chloride 0.9%  3 mL Intravenous Q8H    warfarin  6 mg Oral Once     PRN Meds:    Review of patient's allergies indicates:  No Known Allergies  Objective:     Vital Signs (Most Recent):  Temp: 98.6 °F (37 °C) (01/04/18 1115)  Pulse: 79 (01/04/18 1200)  Resp: 18 (01/04/18 1115)  BP: (!) 78/0 (01/04/18 1115)  SpO2: 98 % (01/04/18 1115) Vital Signs (24h Range):  Temp:  [69.2 °F (20.7 °C)-99.6 °F (37.6 °C)] 98.6 °F (37 °C)  Pulse:  [] 79  Resp:  [14-18] 18  SpO2:  [97 %-99 %] 98 %  BP: (60-98)/(0-64) 78/0     Patient Vitals for the past 72 hrs (Last 3 readings):   Weight   01/04/18 0700 65.6 kg (144 lb 10 oz)   01/03/18 0900 64.9 kg (143 lb 1.3 oz)   01/02/18 2208 66.6 kg (146 lb 13.2 oz)     Body mass index is 21.36 kg/m².      Intake/Output Summary (Last 24 hours) at 01/04/18 1246  Last data filed at 01/04/18 0756   Gross per 24 hour   Intake              650 ml   Output             2725 ml   Net            -2075 ml     Hemodynamic Parameters:    Telemetry: nsr    Physical Exam   Constitutional: He is oriented to person, place, and time. He  appears well-developed and well-nourished.   HENT:   Head: Normocephalic.   Eyes: Pupils are equal, round, and reactive to light.   Neck: Normal range of motion. Neck supple.   Cardiovascular: Normal rate and regular rhythm.    VAD hum.   Pulmonary/Chest: Effort normal and breath sounds normal.   Abdominal: Soft. Bowel sounds are normal.   Musculoskeletal: Normal range of motion.   Neurological: He is alert and oriented to person, place, and time.   Skin: Skin is warm and dry.   Psychiatric: He has a normal mood and affect. His behavior is normal.   Nursing note and vitals reviewed.      Significant Labs:  CBC:    Recent Labs  Lab 01/02/18 2352 01/03/18  0608 01/04/18  0628   WBC 5.12  5.12 5.26 5.34   RBC 2.69*  2.69* 2.71* 2.70*   HGB 6.9*  6.9* 6.9* 6.9*   HCT 22.0*  22.0* 22.3* 22.8*     206 208 212   MCV 82  82 82 84   MCH 25.7*  25.7* 25.5* 25.6*   MCHC 31.4*  31.4* 30.9* 30.3*     BNP:    Recent Labs  Lab 12/29/17  0626   *     CMP:    Recent Labs  Lab 01/02/18 2352 01/03/18  0608 01/04/18  0628   GLU 93 72 81   CALCIUM 9.3 9.1 9.2   ALBUMIN 2.9*  --   --    PROT 6.3  --   --     139 139   K 3.4* 3.5 3.6   CO2 29 28 30*    102 103   BUN 23 20 17   CREATININE 1.0 0.9 0.9   ALKPHOS 61  --   --    ALT 10  --   --    AST 17  --   --    BILITOT 0.3  --   --       Coagulation:     Recent Labs  Lab 01/02/18 01/03/18  0758 01/04/18  0628   INR 1.4  1.4 1.4* 1.3*     LDH:    Recent Labs  Lab 01/03/18  0758 01/04/18  0628    178     Microbiology:  Microbiology Results (last 7 days)     ** No results found for the last 168 hours. **        I have reviewed all pertinent labs within the past 24 hours.    Estimated Creatinine Clearance: 73.9 mL/min (based on SCr of 0.9 mg/dL).    Diagnostic Results:  I have reviewed all pertinent imaging results/findings within the past 24 hours.    Assessment and Plan:     No notes on file    * Subtherapeutic international normalized ratio (INR)  "   -Continue UFH bridge/warfarin          LVAD (left ventricular assist device) present    -HM3 implanted 7/2017 as DT. Speed 5200.  -ASA recently stopped due to NSAID induced ulcer and major GI bleed.  -INR goal of 2-3. INR 1.3 today. UFH/warfarin as above. Will boost coumadin today.   - home dose of coumadin 3 T/Th/Sat, 2 all other days  -Intermittently pulsatile.  -MAP goal of 60-80.  -Continue Amlodipine 10mg. Transition from hydralazine to ACE for renal protection   -Lasix 40 mg BID.        Normocytic anemia    -Recent "NSAID induced ulcer" per EGD.   -H/H stable from DC.   -Continue BID PPI .  -Patient not tolerant of oral iron per report. Tolerating IV iron well without symptoms.               Isabel Chen PA-C  Heart Transplant  [facility]  "

## 2018-01-04 NOTE — SUBJECTIVE & OBJECTIVE
Interval History: No complaints. Feels great.    Continuous Infusions:   heparin (porcine) in D5W 19 Units/kg/hr (01/03/18 2159)     Scheduled Meds:   amLODIPine  10 mg Oral Daily    dronabinol  5 mg Oral TID AC    ferric gluconate (FERRLECIT) IVPB  250 mg Intravenous BID    furosemide  40 mg Oral BID    lisinopril  5 mg Oral Daily    magnesium oxide  400 mg Oral BID    pantoprazole  40 mg Oral BID AC    polyethylene glycol  17 g Oral Daily    potassium chloride  60 mEq Oral Daily    pravastatin  20 mg Oral QHS    sodium chloride 0.9%  3 mL Intravenous Q8H    warfarin  6 mg Oral Once     PRN Meds:    Review of patient's allergies indicates:  No Known Allergies  Objective:     Vital Signs (Most Recent):  Temp: 98.6 °F (37 °C) (01/04/18 1115)  Pulse: 79 (01/04/18 1200)  Resp: 18 (01/04/18 1115)  BP: (!) 78/0 (01/04/18 1115)  SpO2: 98 % (01/04/18 1115) Vital Signs (24h Range):  Temp:  [69.2 °F (20.7 °C)-99.6 °F (37.6 °C)] 98.6 °F (37 °C)  Pulse:  [] 79  Resp:  [14-18] 18  SpO2:  [97 %-99 %] 98 %  BP: (60-98)/(0-64) 78/0     Patient Vitals for the past 72 hrs (Last 3 readings):   Weight   01/04/18 0700 65.6 kg (144 lb 10 oz)   01/03/18 0900 64.9 kg (143 lb 1.3 oz)   01/02/18 2208 66.6 kg (146 lb 13.2 oz)     Body mass index is 21.36 kg/m².      Intake/Output Summary (Last 24 hours) at 01/04/18 1246  Last data filed at 01/04/18 0756   Gross per 24 hour   Intake              650 ml   Output             2725 ml   Net            -2075 ml     Hemodynamic Parameters:    Telemetry: nsr    Physical Exam   Constitutional: He is oriented to person, place, and time. He appears well-developed and well-nourished.   HENT:   Head: Normocephalic.   Eyes: Pupils are equal, round, and reactive to light.   Neck: Normal range of motion. Neck supple.   Cardiovascular: Normal rate and regular rhythm.    VAD hum.   Pulmonary/Chest: Effort normal and breath sounds normal.   Abdominal: Soft. Bowel sounds are normal.    Musculoskeletal: Normal range of motion.   Neurological: He is alert and oriented to person, place, and time.   Skin: Skin is warm and dry.   Psychiatric: He has a normal mood and affect. His behavior is normal.   Nursing note and vitals reviewed.      Significant Labs:  CBC:    Recent Labs  Lab 01/02/18 2352 01/03/18 0608 01/04/18 0628   WBC 5.12  5.12 5.26 5.34   RBC 2.69*  2.69* 2.71* 2.70*   HGB 6.9*  6.9* 6.9* 6.9*   HCT 22.0*  22.0* 22.3* 22.8*     206 208 212   MCV 82  82 82 84   MCH 25.7*  25.7* 25.5* 25.6*   MCHC 31.4*  31.4* 30.9* 30.3*     BNP:    Recent Labs  Lab 12/29/17  0626   *     CMP:    Recent Labs  Lab 01/02/18 2352 01/03/18 0608 01/04/18  0628   GLU 93 72 81   CALCIUM 9.3 9.1 9.2   ALBUMIN 2.9*  --   --    PROT 6.3  --   --     139 139   K 3.4* 3.5 3.6   CO2 29 28 30*    102 103   BUN 23 20 17   CREATININE 1.0 0.9 0.9   ALKPHOS 61  --   --    ALT 10  --   --    AST 17  --   --    BILITOT 0.3  --   --       Coagulation:     Recent Labs  Lab 01/02/18 01/03/18 0758 01/04/18  0628   INR 1.4  1.4 1.4* 1.3*     LDH:    Recent Labs  Lab 01/03/18 0758 01/04/18  0628    178     Microbiology:  Microbiology Results (last 7 days)     ** No results found for the last 168 hours. **        I have reviewed all pertinent labs within the past 24 hours.    Estimated Creatinine Clearance: 73.9 mL/min (based on SCr of 0.9 mg/dL).    Diagnostic Results:  I have reviewed all pertinent imaging results/findings within the past 24 hours.

## 2018-01-04 NOTE — ASSESSMENT & PLAN NOTE
"-Recent "NSAID induced ulcer" per EGD.   -H/H stable from DC.   -Continue BID PPI .  -Patient not tolerant of oral iron per report. Tolerating IV iron well without symptoms.     "

## 2018-01-05 NOTE — PROCEDURES
Patient laying in bed nAD, wife at bedside. VAD interrogation completed this AM in the event changes needed to be made. Will continue to monitor for further issues.     Pulsatile: Yes, intermittent no pulse felt today  VAD Sounds: HM3  Smooth  Problems / Issues / Alarms with VAD if any: None noted  HCT: 24       VAD Interrogation:  TXP LVAD INTERROGATIONS 1/5/2018 1/5/2018 1/5/2018 1/4/2018 1/4/2018 1/4/2018 1/4/2018   Type HeartMate3 HeartMate3 HeartMate3 HeartMate3 - HeartMate3 -   Flow 4.1 4.1 4.2 4.2 4.2 4.1 4.2   Speed 5200 5200 5200 5200 5200 5200 5200   PI 3.6 3.9 3.8 4.0 3.7 3.5 3.7   Power ([place]) 3.5 3.5 3.5 3.5 3.6 3.6 3.6   LSL 4800 4800 4800 4800 - 4800 4800   Pulsatility - No Pulse No Pulse No Pulse - No Pulse No Pulse   }

## 2018-01-05 NOTE — SUBJECTIVE & OBJECTIVE
Interval History: No complaints. Feels great.  Frustrated that INR is still low    Continuous Infusions:   heparin (porcine) in D5W 18 Units/kg/hr (01/05/18 0727)     Scheduled Meds:   amLODIPine  10 mg Oral Daily    dronabinol  5 mg Oral TID AC    furosemide  40 mg Oral BID    lisinopril  5 mg Oral Daily    magnesium oxide  400 mg Oral BID    pantoprazole  40 mg Oral BID AC    polyethylene glycol  17 g Oral Daily    potassium chloride  60 mEq Oral Daily    pravastatin  20 mg Oral QHS    sodium chloride 0.9%  3 mL Intravenous Q8H    [START ON 1/6/2018] warfarin  5 mg Oral Daily    warfarin  6 mg Oral Once     PRN Meds:    Review of patient's allergies indicates:  No Known Allergies  Objective:     Vital Signs (Most Recent):  Temp: 98.2 °F (36.8 °C) (01/05/18 1239)  Pulse: 79 (01/05/18 1239)  Resp: 16 (01/05/18 1239)  BP: (!) 68/0 (01/05/18 1239)  SpO2: 97 % (01/05/18 1239) Vital Signs (24h Range):  Temp:  [98.1 °F (36.7 °C)-99 °F (37.2 °C)] 98.2 °F (36.8 °C)  Pulse:  [78-81] 79  Resp:  [16-18] 16  SpO2:  [96 %-99 %] 97 %  BP: (64-89)/(0-62) 68/0     Patient Vitals for the past 72 hrs (Last 3 readings):   Weight   01/05/18 0822 64.1 kg (141 lb 5 oz)   01/04/18 0700 65.6 kg (144 lb 10 oz)   01/03/18 0900 64.9 kg (143 lb 1.3 oz)     Body mass index is 20.87 kg/m².      Intake/Output Summary (Last 24 hours) at 01/05/18 1503  Last data filed at 01/05/18 1400   Gross per 24 hour   Intake             1570 ml   Output             1850 ml   Net             -280 ml     Hemodynamic Parameters:    Telemetry: nsr    Physical Exam   Constitutional: He is oriented to person, place, and time. He appears well-developed and well-nourished.   HENT:   Head: Normocephalic.   Eyes: Pupils are equal, round, and reactive to light.   Neck: Normal range of motion. Neck supple.   Cardiovascular: Normal rate and regular rhythm.    VAD hum.   Pulmonary/Chest: Effort normal and breath sounds normal.   Abdominal: Soft. Bowel sounds  are normal.   Musculoskeletal: Normal range of motion.   Neurological: He is alert and oriented to person, place, and time.   Skin: Skin is warm and dry.   Psychiatric: He has a normal mood and affect. His behavior is normal.   Nursing note and vitals reviewed.      Significant Labs:  CBC:    Recent Labs  Lab 01/03/18  0608 01/04/18 0628 01/05/18  0510   WBC 5.26 5.34 7.50   RBC 2.71* 2.70* 2.81*   HGB 6.9* 6.9* 7.2*   HCT 22.3* 22.8* 24.0*    212 221   MCV 82 84 85   MCH 25.5* 25.6* 25.6*   MCHC 30.9* 30.3* 30.0*     BNP:    Recent Labs  Lab 01/05/18  0510   *     CMP:    Recent Labs  Lab 01/02/18  2352 01/03/18 0608 01/04/18 0628 01/05/18  0510   GLU 93 72 81 68*   CALCIUM 9.3 9.1 9.2 9.2   ALBUMIN 2.9*  --   --  2.9*   PROT 6.3  --   --  6.4    139 139 138   K 3.4* 3.5 3.6 4.0   CO2 29 28 30* 27    102 103 101   BUN 23 20 17 16   CREATININE 1.0 0.9 0.9 0.9   ALKPHOS 61  --   --  61   ALT 10  --   --  8*   AST 17  --   --  17   BILITOT 0.3  --   --  0.4      Coagulation:     Recent Labs  Lab 01/03/18 0758 01/04/18 0628 01/05/18  0510   INR 1.4* 1.3* 1.4*     LDH:    Recent Labs  Lab 01/03/18 0758 01/04/18 0628 01/05/18  0510    178 190     Microbiology:  Microbiology Results (last 7 days)     ** No results found for the last 168 hours. **        I have reviewed all pertinent labs within the past 24 hours.    Estimated Creatinine Clearance: 72.2 mL/min (based on SCr of 0.9 mg/dL).    Diagnostic Results:  I have reviewed all pertinent imaging results/findings within the past 24 hours.

## 2018-01-05 NOTE — PROGRESS NOTES
Heparin gtt decreased to 18u/kg or 12ml/hr for antx-a=0.76 pr nomagram. Antx-a reordered for 1330.

## 2018-01-05 NOTE — PROGRESS NOTES
[facility]  Heart Transplant  Progress Note    Patient Name: Suman Hayden  MRN: [id number]  Admission Date: 1/2/2018  Hospital Length of Stay: 3 days  Attending Physician: Mohit Ambrosio MD  Primary Care Provider: Joe Ernst MD  Principal Problem:Subtherapeutic international normalized ratio (INR)    Subjective:     Interval History: No complaints. Feels great.  Frustrated that INR is still low    Continuous Infusions:   heparin (porcine) in D5W 18 Units/kg/hr (01/05/18 0727)     Scheduled Meds:   amLODIPine  10 mg Oral Daily    dronabinol  5 mg Oral TID AC    furosemide  40 mg Oral BID    lisinopril  5 mg Oral Daily    magnesium oxide  400 mg Oral BID    pantoprazole  40 mg Oral BID AC    polyethylene glycol  17 g Oral Daily    potassium chloride  60 mEq Oral Daily    pravastatin  20 mg Oral QHS    sodium chloride 0.9%  3 mL Intravenous Q8H    [START ON 1/6/2018] warfarin  5 mg Oral Daily    warfarin  6 mg Oral Once     PRN Meds:    Review of patient's allergies indicates:  No Known Allergies  Objective:     Vital Signs (Most Recent):  Temp: 98.2 °F (36.8 °C) (01/05/18 1239)  Pulse: 79 (01/05/18 1239)  Resp: 16 (01/05/18 1239)  BP: (!) 68/0 (01/05/18 1239)  SpO2: 97 % (01/05/18 1239) Vital Signs (24h Range):  Temp:  [98.1 °F (36.7 °C)-99 °F (37.2 °C)] 98.2 °F (36.8 °C)  Pulse:  [78-81] 79  Resp:  [16-18] 16  SpO2:  [96 %-99 %] 97 %  BP: (64-89)/(0-62) 68/0     Patient Vitals for the past 72 hrs (Last 3 readings):   Weight   01/05/18 0822 64.1 kg (141 lb 5 oz)   01/04/18 0700 65.6 kg (144 lb 10 oz)   01/03/18 0900 64.9 kg (143 lb 1.3 oz)     Body mass index is 20.87 kg/m².      Intake/Output Summary (Last 24 hours) at 01/05/18 1503  Last data filed at 01/05/18 1400   Gross per 24 hour   Intake             1570 ml   Output             1850 ml   Net             -280 ml     Hemodynamic Parameters:    Telemetry: nsr    Physical Exam   Constitutional: He is oriented to person,  place, and time. He appears well-developed and well-nourished.   HENT:   Head: Normocephalic.   Eyes: Pupils are equal, round, and reactive to light.   Neck: Normal range of motion. Neck supple.   Cardiovascular: Normal rate and regular rhythm.    VAD hum.   Pulmonary/Chest: Effort normal and breath sounds normal.   Abdominal: Soft. Bowel sounds are normal.   Musculoskeletal: Normal range of motion.   Neurological: He is alert and oriented to person, place, and time.   Skin: Skin is warm and dry.   Psychiatric: He has a normal mood and affect. His behavior is normal.   Nursing note and vitals reviewed.      Significant Labs:  CBC:    Recent Labs  Lab 01/03/18  0608 01/04/18 0628 01/05/18  0510   WBC 5.26 5.34 7.50   RBC 2.71* 2.70* 2.81*   HGB 6.9* 6.9* 7.2*   HCT 22.3* 22.8* 24.0*    212 221   MCV 82 84 85   MCH 25.5* 25.6* 25.6*   MCHC 30.9* 30.3* 30.0*     BNP:    Recent Labs  Lab 01/05/18  0510   *     CMP:    Recent Labs  Lab 01/02/18  2352 01/03/18  0608 01/04/18 0628 01/05/18  0510   GLU 93 72 81 68*   CALCIUM 9.3 9.1 9.2 9.2   ALBUMIN 2.9*  --   --  2.9*   PROT 6.3  --   --  6.4    139 139 138   K 3.4* 3.5 3.6 4.0   CO2 29 28 30* 27    102 103 101   BUN 23 20 17 16   CREATININE 1.0 0.9 0.9 0.9   ALKPHOS 61  --   --  61   ALT 10  --   --  8*   AST 17  --   --  17   BILITOT 0.3  --   --  0.4      Coagulation:     Recent Labs  Lab 01/03/18 0758 01/04/18 0628 01/05/18  0510   INR 1.4* 1.3* 1.4*     LDH:    Recent Labs  Lab 01/03/18 0758 01/04/18 0628 01/05/18  0510    178 190     Microbiology:  Microbiology Results (last 7 days)     ** No results found for the last 168 hours. **        I have reviewed all pertinent labs within the past 24 hours.    Estimated Creatinine Clearance: 72.2 mL/min (based on SCr of 0.9 mg/dL).    Diagnostic Results:  I have reviewed all pertinent imaging results/findings within the past 24 hours.    Assessment and Plan:     No notes on file    *  "Subtherapeutic international normalized ratio (INR)    -Continue UFH bridge/warfarin          LVAD (left ventricular assist device) present    -HM3 implanted 7/2017 as DT. Speed 5200.  -ASA recently stopped due to NSAID induced ulcer and major GI bleed.  -INR goal of 2-3. INR 1.3 today. UFH/warfarin as above. Will boost coumadin today.   - home dose of coumadin 3 T/Th/Sat, 2 all other days  -Intermittently pulsatile.  -MAP goal of 60-80.  -Continue Amlodipine 10mg. Transition from hydralazine to ACE for renal protection   -Lasix 40 mg BID.        Normocytic anemia    -Recent "NSAID induced ulcer" per EGD.   -H/H stable from DC.   -Continue BID PPI .  -Patient not tolerant of oral iron per report. Tolerating IV iron well without symptoms.               MELISSA Jon  Heart Transplant  [facility]  "

## 2018-01-05 NOTE — PROGRESS NOTES
Still waiting for am dose of ferrlecit from pharmacy. Marinol po given as ordered. Updated wife and pt on plan of care

## 2018-01-05 NOTE — PLAN OF CARE
Problem: Patient Care Overview  Goal: Plan of Care Review  Outcome: Ongoing (interventions implemented as appropriate)  Patient remains free of falls or injury. Patient denies pain. Admitted for subtherapeutic INR; current INR 1.3. Continued on heparin drip as coumadin bridge. Plan of care reviewed with patient and wife.

## 2018-01-06 PROBLEM — R78.81 GRAM-POSITIVE BACTEREMIA: Status: ACTIVE | Noted: 2018-01-01

## 2018-01-06 NOTE — PLAN OF CARE
Problem: Patient Care Overview  Goal: Plan of Care Review  Outcome: Ongoing (interventions implemented as appropriate)  Plan of care discussed with patient and wife. Remains on Heparin gtt at 17 u/kg and titrating to antx-a which is 0.74. Temp of 102.9 noted and tylenol and blood cultures done. Temp down to 100.5 orally at 1900.  Wife did LVAD dressing change around noon. All VAD numbers WNL.Patient is free of fall/trauma/injury. Denies CP, SOB, or pain/discomfort. All questions addressed. Will continue to monitor

## 2018-01-06 NOTE — PROGRESS NOTES
Heart Failure Progress Note  Attending Physician: Mohit Ambrosio MD  Hospital Day: 5    Subjective:   Interval History: no overnight events but yesterday pt had fever to 102.9.  BCx drawn that came back positive in the late morning for G+ coccae.  ID consulted and made recommendations for Vancomycin.    Medications:   Continuous Infusions:   heparin (porcine) in D5W 17 Units/kg/hr (01/05/18 2268)       Scheduled Meds:   amLODIPine  10 mg Oral Daily    dronabinol  5 mg Oral TID AC    furosemide  40 mg Oral BID    lisinopril  5 mg Oral Daily    magnesium oxide  400 mg Oral BID    pantoprazole  40 mg Oral BID AC    polyethylene glycol  17 g Oral Daily    potassium chloride  60 mEq Oral Daily    pravastatin  20 mg Oral QHS    sodium chloride 0.9%  3 mL Intravenous Q8H    warfarin  5 mg Oral Daily     PRN Meds:acetaminophen    ----------------------------------------------------------------------------------------------------------------------  Home medications:   No current facility-administered medications on file prior to encounter.      Current Outpatient Prescriptions on File Prior to Encounter   Medication Sig Dispense Refill    amLODIPine (NORVASC) 10 MG tablet Take 1 tablet (10 mg total) by mouth once daily. 30 tablet 11    dronabinol (MARINOL) 5 MG capsule Take 1 capsule (5 mg total) by mouth 3 (three) times daily before meals. 90 capsule 5    furosemide (LASIX) 40 MG tablet Take 1 tablet (40 mg total) by mouth 2 (two) times daily. 60 tablet 11    hydrALAZINE (APRESOLINE) 50 MG tablet Take 1 tablet (50 mg total) by mouth every 8 (eight) hours. 90 tablet 11    magnesium oxide (MAG-OX) 400 mg tablet Take 1 tablet (400 mg total) by mouth 2 (two) times daily. 60 tablet 11    pantoprazole (PROTONIX) 40 MG tablet Take 1 tablet (40 mg total) by mouth 2 (two) times daily before meals. 60 tablet 11    polyethylene glycol (GLYCOLAX) 17 gram/dose powder Take 17 g by mouth daily as needed. 1700 g 3  "   potassium chloride SA (K-DUR,KLOR-CON) 20 MEQ tablet Take 2 tablets (40 mEq total) by mouth once daily. 60 tablet 11    pravastatin (PRAVACHOL) 20 MG tablet Take 1 tablet (20 mg total) by mouth every evening. 30 tablet 11    warfarin (COUMADIN) 2 MG tablet 4mg 12/29 only, followed by weekly dose of 3mg/day, except 2mg on Saturdays 45 tablet 11         Objective:     Vitals:  Temp:  [98 °F (36.7 °C)-102.9 °F (39.4 °C)]   Pulse:  [70-98]   Resp:  [16-20]   BP: (62-82)/(0-57)   SpO2:  [96 %-99 %]     BP (!) 62/0 (BP Location: Left arm, Patient Position: Lying)   Pulse 78   Temp 99 °F (37.2 °C) (Oral)   Resp 20   Ht 5' 9" (1.753 m)   Wt 64.1 kg (141 lb 5 oz)   SpO2 99%   BMI 20.87 kg/m²  I/O's:    Intake/Output Summary (Last 24 hours) at 01/06/18 0738  Last data filed at 01/06/18 0600   Gross per 24 hour   Intake            935.6 ml   Output             1425 ml   Net           -489.4 ml       Wt Readings from Last 10 Encounters:   01/05/18 64.1 kg (141 lb 5 oz)   12/29/17 64 kg (141 lb 1.5 oz)   12/18/17 59.7 kg (131 lb 9.8 oz)   12/06/17 64.4 kg (142 lb)   12/01/17 60.2 kg (132 lb 11.5 oz)   11/28/17 63.5 kg (140 lb)   11/21/17 63.8 kg (140 lb 10.5 oz)   11/08/17 66.7 kg (147 lb)   10/26/17 64.9 kg (143 lb)   10/26/17 69.2 kg (152 lb 8.9 oz)        General Appearance:    Alert, cooperative, no distress, talking   Lungs:     Clear to auscultation bilaterally, respirations unlabored    Heart:    No JVP, Regular rate and rhythm, normal hum of LVAD   Abdomen:     Soft, non-tender, bowel sounds active, no masses, no organomegaly   Extremities:   no edema b/l, pulses +1 b/l       Labs:       Recent Labs  Lab 01/04/18  0628 01/05/18  0510 01/06/18  0412    138 135*   K 3.6 4.0 4.3    101 101   CO2 30* 27 27   BUN 17 16 19   CREATININE 0.9 0.9 0.9   GLU 81 68* 77   ANIONGAP 6* 10 7*       Recent Labs  Lab 01/02/18  2352  01/05/18  0510 01/06/18  0412   AST 17  --  17  --    ALT 10  --  8*  --  "   ALKPHOS 61  --  61  --    BILITOT 0.3  --  0.4  --    BILIDIR  --   --  0.2  --    ALBUMIN 2.9*  --  2.9*  --    LDH  --   < > 190 144   < > = values in this interval not displayed.  No results for input(s): PH, PCO2, PO2, HCO3, POCSATURATED in the last 168 hours.     Recent Labs  Lab 01/04/18 0628 01/05/18 0510 01/06/18 0412   WBC 5.34 7.50 7.30   HGB 6.9* 7.2* 6.6*   HCT 22.8* 24.0* 22.0*    221 204   GRAN 58.3  3.1 70.3  5.3 70.2  5.1       Recent Labs  Lab 01/04/18 0628 01/05/18 0510 01/06/18 0412   INR 1.3* 1.4* 1.5*       Recent Labs  Lab 01/05/18 0510   *      Micro:   Blood Cultures  Lab Results   Component Value Date    LABBLOO No Growth to date 01/05/2018    LABBLOO No Growth to date 01/05/2018    LABBLOO No growth after 5 days. 08/29/2017    LABBLOO No growth after 5 days. 08/11/2017    LABBLOO Gram stain clement bottle: Gram negative rods  08/09/2017    LABBLOO  08/09/2017     Results called to and read back by: Emma York RN 08/10/2017  11:13    LABBLOO ESCHERICHIA COLI ESBL 08/09/2017     Urine Cultures  Lab Results   Component Value Date    LABURIN No growth 08/09/2017    LABURIN No growth 08/04/2017    LABURIN  07/24/2017     ENTEROCOCCUS FAECALIS  >100,000 cfu/ml  No other significant isolate       EF   Date Value Ref Range Status   11/21/2017 13 (A) 55 - 65    10/11/2017 10 (A) 55 - 65    09/20/2017 10 (A) 55 - 65    09/11/2017 30 (A) 55 - 65    09/08/2017 10 (A) 55 - 65      Assessment and Plan:         Bacteremia    -continue ID recommendations and initiate Vancomycin 1g q12h with trough before 4th dose  -likely source is pacemaker leads       * Subtherapeutic international normalized ratio (INR)     -Continue UFH bridge/warfarin; INR 1.5 dosed at 5 mg last night  -will continue 5 mg tonight again           LVAD (left ventricular assist device) present     -HM3 implanted 7/2017 as DT. Speed 5200.  -ASA recently stopped due to NSAID induced ulcer and major GI  "bleed.  -INR goal of 2-3. INR 1.5 today. UFH/warfarin as above.               - home dose of coumadin 3 T/Th/Sat, 2 all other days  -Intermittently pulsatile.  -MAP goal of 60-80.  -Continue Amlodipine 10mg. Transitioned from hydralazine to ACE for renal protection   -Lasix 40 mg BID.            Normocytic anemia     -Recent "NSAID induced ulcer" per EGD.   -H/H stable from DC.   -Continue BID PPI .   -Patient not tolerant of oral iron per report. Continue IV iron.        Signed:  Kimmie Solis MD  Cardiology Hospitalist  Pager: [phone]  1/6/2018 7:38 AM  "

## 2018-01-06 NOTE — SUBJECTIVE & OBJECTIVE
Past Medical History:   Diagnosis Date    Cardiomyopathy     Hyperlipidemia     Hypertension     Obesity     S/P implantation of automatic cardioverter/defibrillator (AICD)     Ventricular tachycardia        Past Surgical History:   Procedure Laterality Date    CARDIAC CATHETERIZATION      CARDIAC DEFIBRILLATOR PLACEMENT      LEFT VENTRICULAR ASSIST DEVICE  07/27/2017       Review of patient's allergies indicates:  No Known Allergies    Medications:  Prescriptions Prior to Admission   Medication Sig    amLODIPine (NORVASC) 10 MG tablet Take 1 tablet (10 mg total) by mouth once daily.    dronabinol (MARINOL) 5 MG capsule Take 1 capsule (5 mg total) by mouth 3 (three) times daily before meals.    furosemide (LASIX) 40 MG tablet Take 1 tablet (40 mg total) by mouth 2 (two) times daily.    hydrALAZINE (APRESOLINE) 50 MG tablet Take 1 tablet (50 mg total) by mouth every 8 (eight) hours.    magnesium oxide (MAG-OX) 400 mg tablet Take 1 tablet (400 mg total) by mouth 2 (two) times daily.    pantoprazole (PROTONIX) 40 MG tablet Take 1 tablet (40 mg total) by mouth 2 (two) times daily before meals.    polyethylene glycol (GLYCOLAX) 17 gram/dose powder Take 17 g by mouth daily as needed.    potassium chloride SA (K-DUR,KLOR-CON) 20 MEQ tablet Take 2 tablets (40 mEq total) by mouth once daily.    pravastatin (PRAVACHOL) 20 MG tablet Take 1 tablet (20 mg total) by mouth every evening.    warfarin (COUMADIN) 2 MG tablet 4mg 12/29 only, followed by weekly dose of 3mg/day, except 2mg on Saturdays     Antibiotics     Start     Stop Route Frequency Ordered    01/06/18 1400  vancomycin in dextrose 5 % 1 gram/250 mL IVPB 1,000 mg  (Vancomycin IVPB with levels panel)      -- IV Every 12 hours (non-standard times) 01/06/18 1254        Antifungals     None        Antivirals     None           Immunization History   Administered Date(s) Administered    Tdap 07/19/2017       Family History     Problem Relation (Age of  Onset)    Heart disease Mother, Father        Social History     Social History    Marital status:      Spouse name: N/A    Number of children: N/A    Years of education: N/A     Social History Main Topics    Smoking status: Never Smoker    Smokeless tobacco: Never Used    Alcohol use No    Drug use: Unknown    Sexual activity: Not Asked     Other Topics Concern    None     Social History Narrative    None     Review of Systems   Constitutional: Positive for fever. Negative for chills.   Respiratory: Positive for cough. Negative for shortness of breath.    Cardiovascular: Negative for chest pain and leg swelling.   Gastrointestinal: Negative for abdominal pain, constipation, diarrhea, nausea and vomiting.   Genitourinary: Negative for dysuria, frequency and hematuria.   Musculoskeletal: Negative for back pain and myalgias.   Skin: Negative for rash and wound.   Neurological: Negative for dizziness, light-headedness and headaches.   Psychiatric/Behavioral: Negative for agitation and behavioral problems. The patient is not nervous/anxious.      Objective:     Vital Signs (Most Recent):  Temp: 99.5 °F (37.5 °C) (01/06/18 1128)  Pulse: 93 (01/06/18 1128)  Resp: 18 (01/06/18 1128)  BP: (!) 62/0 (01/06/18 1128)  SpO2: 98 % (01/06/18 1128) Vital Signs (24h Range):  Temp:  [98 °F (36.7 °C)-102.9 °F (39.4 °C)] 99.5 °F (37.5 °C)  Pulse:  [70-98] 93  Resp:  [18-20] 18  SpO2:  [96 %-99 %] 98 %  BP: (62-72)/(0) 62/0     Weight: 63.4 kg (139 lb 12.4 oz)  Body mass index is 20.64 kg/m².    Estimated Creatinine Clearance: 71.4 mL/min (based on SCr of 0.9 mg/dL).    Physical Exam   Constitutional: He is oriented to person, place, and time. He appears well-developed and well-nourished. No distress.   Cardiovascular: Normal rate and regular rhythm.    No murmur heard.  VAD hum   Pulmonary/Chest: Effort normal and breath sounds normal. No respiratory distress.   Abdominal: Soft. Bowel sounds are normal. He exhibits no  distension.       Musculoskeletal: Normal range of motion. He exhibits no edema.   Neurological: He is alert and oriented to person, place, and time.   Skin: Skin is warm and dry.        Psychiatric: He has a normal mood and affect. His behavior is normal.       Significant Labs:   Blood Culture:   Recent Labs  Lab 08/09/17  0813 08/11/17  1326 08/29/17  0836 01/05/18  1605 01/05/18  1606   LABBLOO Gram stain clement bottle: Gram negative rods   Results called to and read back by: Emma York RN 08/10/2017  11:13  ESCHERICHIA COLI ESBL No growth after 5 days. No growth after 5 days. Gram stain clement bottle: Gram positive cocci in clusters resembling Staph   Results called to and read back by:Adalgisa Guadarrama RN 01/06/2018  11:21 Gram stain clement bottle: Gram positive cocci in clusters resembling Staph   Results called to and read back by:Adalgsia Guadarrama RN 01/06/2018  11:22     CBC:   Recent Labs  Lab 01/05/18  0510 01/06/18  0412   WBC 7.50 7.30   HGB 7.2* 6.6*   HCT 24.0* 22.0*    204     CMP:   Recent Labs  Lab 01/05/18  0510 01/06/18  0412    135*   K 4.0 4.3    101   CO2 27 27   GLU 68* 77   BUN 16 19   CREATININE 0.9 0.9   CALCIUM 9.2 8.1*   PROT 6.4  --    ALBUMIN 2.9*  --    BILITOT 0.4  --    ALKPHOS 61  --    AST 17  --    ALT 8*  --    ANIONGAP 10 7*   EGFRNONAA >60.0 >60.0       Significant Imaging: I have reviewed all pertinent imaging results/findings within the past 24 hours.

## 2018-01-06 NOTE — PROGRESS NOTES
Pt c/o itching. No PRN orders for anti-itch medication ordered. MD Cas notified. TORB given for 25mg of PO benadrl x1. Orders carried out, will continue to monitor.

## 2018-01-06 NOTE — CONSULTS
[facility]  Infectious Disease  Consult Note    Patient Name: Suman Hayden  MRN: [id number]  Admission Date: 1/2/2018  Hospital Length of Stay: 4 days  Attending Physician: Mohit Ambrosio MD  Primary Care Provider: Joe Ernst MD     Isolation Status: No active isolations    Patient information was obtained from patient and past medical records.      Inpatient consult to Infectious Diseases  Consult performed by: AUNDREA LANDIN  Consult ordered by: PRANAV ASH        Assessment/Plan:     Gram-positive bacteremia    67 year old male with NICM s/p LVAD 7/2017, HTN, DLD, recent ICD revision 11/20 with Jacuqe-Jennifer c/b pocket hematoma and recent GIB requiring transfusion and EGD who was admitted for INR of 1.4; spiked fever yesterday to 103; blood cultures obtained and now with GPCs in clusters resembling staph:  - DLES viewed and no signs of drainage or erythema  - c/o mild cough; lungs CTA  - pacer pocket with slight swelling but no erythema or fluctuance    Plan:  1. Repeat blood cultures x 2  2. Start vancomycin 1 gram IV q 12 hours  3. Will f/u on ID and susceptibilities; if staph aureus, will likely need to evaluate heart valves and pacer leads  4. Will obtain CXR given cough and bacteremia  5. ID will follow          Thank you for your consult. I will follow-up with patient. Please contact us if you have any additional questions.  MELISSA Baron, pager: [phone]  Infectious Disease  [facility]    Subjective:     Principal Problem: Subtherapeutic international normalized ratio (INR)    HPI: This is a 67 year old male with PMH of NICM s/p LVAD 7/2017, HTN, DLD, recent ICD revision 11/20 with Jacque-Jennifer c/b pocket hematoma and recent GIB requiring transfusion and EGD; discharged on 12/22 from that admission. Patient's INR was 1.4 and he was advised to come in for admission. Yesterday, patient spiked a fever of 102.9. Blood cultures were obtained and both sets  with GPCs in chains resembling stapAceable. ID consulted for recs.  Patient and wife deny LVAD DLES drainage. His only complaint is a mild cough with some sputum production. He has slight pain over his pacer pocket. Denies recent absesses or boils.     Past Medical History:   Diagnosis Date    Cardiomyopathy     Hyperlipidemia     Hypertension     Obesity     S/P implantation of automatic cardioverter/defibrillator (AICD)     Ventricular tachycardia        Past Surgical History:   Procedure Laterality Date    CARDIAC CATHETERIZATION      CARDIAC DEFIBRILLATOR PLACEMENT      LEFT VENTRICULAR ASSIST DEVICE  07/27/2017       Review of patient's allergies indicates:  No Known Allergies    Medications:  Prescriptions Prior to Admission   Medication Sig    amLODIPine (NORVASC) 10 MG tablet Take 1 tablet (10 mg total) by mouth once daily.    dronabinol (MARINOL) 5 MG capsule Take 1 capsule (5 mg total) by mouth 3 (three) times daily before meals.    furosemide (LASIX) 40 MG tablet Take 1 tablet (40 mg total) by mouth 2 (two) times daily.    hydrALAZINE (APRESOLINE) 50 MG tablet Take 1 tablet (50 mg total) by mouth every 8 (eight) hours.    magnesium oxide (MAG-OX) 400 mg tablet Take 1 tablet (400 mg total) by mouth 2 (two) times daily.    pantoprazole (PROTONIX) 40 MG tablet Take 1 tablet (40 mg total) by mouth 2 (two) times daily before meals.    polyethylene glycol (GLYCOLAX) 17 gram/dose powder Take 17 g by mouth daily as needed.    potassium chloride SA (K-DUR,KLOR-CON) 20 MEQ tablet Take 2 tablets (40 mEq total) by mouth once daily.    pravastatin (PRAVACHOL) 20 MG tablet Take 1 tablet (20 mg total) by mouth every evening.    warfarin (COUMADIN) 2 MG tablet 4mg 12/29 only, followed by weekly dose of 3mg/day, except 2mg on Saturdays     Antibiotics     Start     Stop Route Frequency Ordered    01/06/18 1400  vancomycin in dextrose 5 % 1 gram/250 mL IVPB 1,000 mg  (Vancomycin IVPB with levels panel)      --  IV Every 12 hours (non-standard times) 01/06/18 1254        Antifungals     None        Antivirals     None           Immunization History   Administered Date(s) Administered    Tdap 07/19/2017       Family History     Problem Relation (Age of Onset)    Heart disease Mother, Father        Social History     Social History    Marital status:      Spouse name: N/A    Number of children: N/A    Years of education: N/A     Social History Main Topics    Smoking status: Never Smoker    Smokeless tobacco: Never Used    Alcohol use No    Drug use: Unknown    Sexual activity: Not Asked     Other Topics Concern    None     Social History Narrative    None     Review of Systems   Constitutional: Positive for fever. Negative for chills.   Respiratory: Positive for cough. Negative for shortness of breath.    Cardiovascular: Negative for chest pain and leg swelling.   Gastrointestinal: Negative for abdominal pain, constipation, diarrhea, nausea and vomiting.   Genitourinary: Negative for dysuria, frequency and hematuria.   Musculoskeletal: Negative for back pain and myalgias.   Skin: Negative for rash and wound.   Neurological: Negative for dizziness, light-headedness and headaches.   Psychiatric/Behavioral: Negative for agitation and behavioral problems. The patient is not nervous/anxious.      Objective:     Vital Signs (Most Recent):  Temp: 99.5 °F (37.5 °C) (01/06/18 1128)  Pulse: 93 (01/06/18 1128)  Resp: 18 (01/06/18 1128)  BP: (!) 62/0 (01/06/18 1128)  SpO2: 98 % (01/06/18 1128) Vital Signs (24h Range):  Temp:  [98 °F (36.7 °C)-102.9 °F (39.4 °C)] 99.5 °F (37.5 °C)  Pulse:  [70-98] 93  Resp:  [18-20] 18  SpO2:  [96 %-99 %] 98 %  BP: (62-72)/(0) 62/0     Weight: 63.4 kg (139 lb 12.4 oz)  Body mass index is 20.64 kg/m².    Estimated Creatinine Clearance: 71.4 mL/min (based on SCr of 0.9 mg/dL).    Physical Exam   Constitutional: He is oriented to person, place, and time. He appears well-developed and  well-nourished. No distress.   Cardiovascular: Normal rate and regular rhythm.    No murmur heard.  VAD hum   Pulmonary/Chest: Effort normal and breath sounds normal. No respiratory distress.   Abdominal: Soft. Bowel sounds are normal. He exhibits no distension.       Musculoskeletal: Normal range of motion. He exhibits no edema.   Neurological: He is alert and oriented to person, place, and time.   Skin: Skin is warm and dry.        Psychiatric: He has a normal mood and affect. His behavior is normal.       Significant Labs:   Blood Culture:   Recent Labs  Lab 08/09/17  0813 08/11/17  1326 08/29/17  0836 01/05/18  1605 01/05/18  1606   LABBLOO Gram stain clement bottle: Gram negative rods   Results called to and read back by: Emma York RN 08/10/2017  11:13  ESCHERICHIA COLI ESBL No growth after 5 days. No growth after 5 days. Gram stain clement bottle: Gram positive cocci in clusters resembling Staph   Results called to and read back by:Adalgisa Guadarrama RN 01/06/2018  11:21 Gram stain clement bottle: Gram positive cocci in clusters resembling Staph   Results called to and read back by:Adalgisa Guadarrama RN 01/06/2018  11:22     CBC:   Recent Labs  Lab 01/05/18  0510 01/06/18  0412   WBC 7.50 7.30   HGB 7.2* 6.6*   HCT 24.0* 22.0*    204     CMP:   Recent Labs  Lab 01/05/18  0510 01/06/18  0412    135*   K 4.0 4.3    101   CO2 27 27   GLU 68* 77   BUN 16 19   CREATININE 0.9 0.9   CALCIUM 9.2 8.1*   PROT 6.4  --    ALBUMIN 2.9*  --    BILITOT 0.4  --    ALKPHOS 61  --    AST 17  --    ALT 8*  --    ANIONGAP 10 7*   EGFRNONAA >60.0 >60.0       Significant Imaging: I have reviewed all pertinent imaging results/findings within the past 24 hours.

## 2018-01-06 NOTE — ASSESSMENT & PLAN NOTE
67 year old male with NICM s/p LVAD 7/2017, HTN, DLD, recent ICD revision 11/20 with Jacque-Jennifer c/b pocket hematoma and recent GIB requiring transfusion and EGD who was admitted for INR of 1.4; spiked fever yesterday to 103; blood cultures obtained and now with GPCs in clusters resembling staph:  - DLES viewed and no signs of drainage or erythema  - c/o mild cough; lungs CTA  - pacer pocket with slight swelling but no erythema or fluctuance    Plan:  1. Repeat blood cultures x 2  2. Start vancomycin 1 gram IV q 12 hours  3. Will f/u on ID and susceptibilities; if staph aureus, will likely need to evaluate heart valves and pacer leads  4. Will obtain CXR given cough and bacteremia  5. ID will follow

## 2018-01-06 NOTE — HPI
This is a 67 year old male with PMH of NICM s/p LVAD 7/2017, HTN, DLD, recent ICD revision 11/20 with Jacque-Jennifer c/b pocket hematoma and recent GIB requiring transfusion and EGD; discharged on 12/22 from that admission. Patient's INR was 1.4 and he was advised to come in for admission. Yesterday, patient spiked a fever of 102.9. Blood cultures were obtained and both sets with GPCs in chains resembling staph. ID consulted for recs.  Patient and wife deny LVAD DLES drainage. His only complaint is a mild cough with some sputum production. He has slight pain over his pacer pocket. Denies recent absesses or boils.

## 2018-01-06 NOTE — PLAN OF CARE
Problem: Patient Care Overview  Goal: Plan of Care Review  Outcome: Ongoing (interventions implemented as appropriate)  Heparin gtt continues to infuse for subtherapeutic INR, Anti-Xa monitored per nomogram and adjusted per order set.  Pt remained free from falls/trauma/injury. VSS. LVAD hum smooth. LVAD numbers and dopplers WDL. Denies any CP, SOB, palpitations, dizziness, pain and discomfort. Turning/repositioning independently in bed. Plan of care reviewed with patient and all questions answered, verbalizes understanding. No acute distress noted. Will continue to monitor.

## 2018-01-07 PROBLEM — B95.8 BACTEREMIA DUE TO STAPHYLOCOCCUS: Status: ACTIVE | Noted: 2018-01-01

## 2018-01-07 NOTE — PLAN OF CARE
Problem: Patient Care Overview  Goal: Plan of Care Review  Outcome: Ongoing (interventions implemented as appropriate)  Heparin gtt continues to infuse for subtherapeutic INR, Anti-Xa monitored per nomogram and adjusted per order set. IV Vanc given overnight per MD order. BP and Doppler low at the start of shift, see note for details. Pt educated on fall risks overnight, Pt remained free from falls/trauma/injury.  LVAD hum smooth. LVAD numbers and dopplers WDL. Denies any CP, SOB, palpitations, dizziness, pain and discomfort. Turning/repositioning independently in bed. Plan of care reviewed with patient and all questions answered, verbalizes understanding. No acute distress noted. Will continue to monitor.

## 2018-01-07 NOTE — PLAN OF CARE
Problem: Patient Care Overview  Goal: Plan of Care Review  Outcome: Ongoing (interventions implemented as appropriate)  Plan of care discussed with patient. Continued on heparin gtt- no changes made. Antx-a=0.40. INR=1.7. Continued coumadin. Blood cultures remain positive from both bottles on 1/6/18. Third dose of vanco given ivpb. Wife continues to change LVAD dressings with soap and h2o daily. No temp today. Hct- 23.3 and changed on monitor. Decrease amlodipine to 5mg po daily.  Patient is free of fall/trauma/injury. Denies CP, SOB, or pain/discomfort. All questions addressed. Will continue to monitor

## 2018-01-07 NOTE — PROGRESS NOTES
Heart Failure Progress Note  Attending Physician: Mohit Ambrosio MD  Hospital Day: 6    Subjective:   Interval History: elevated temp to 99 overnight; no complaints, denies subjective fever or chills    Medications:   Continuous Infusions:   heparin (porcine) in D5W 17 Units/kg/hr (01/06/18 1705)       Scheduled Meds:   amLODIPine  5 mg Oral Daily    dronabinol  5 mg Oral TID AC    furosemide  40 mg Oral BID    lisinopril  5 mg Oral Daily    magnesium oxide  400 mg Oral BID    pantoprazole  40 mg Oral BID AC    polyethylene glycol  17 g Oral Daily    potassium chloride  60 mEq Oral Daily    pravastatin  20 mg Oral QHS    sodium chloride 0.9%  3 mL Intravenous Q8H    vancomycin (VANCOCIN) IVPB  1,000 mg Intravenous Q12H    warfarin  5 mg Oral Daily     PRN Meds:acetaminophen    ----------------------------------------------------------------------------------------------------------------------  Home medications:   No current facility-administered medications on file prior to encounter.      Current Outpatient Prescriptions on File Prior to Encounter   Medication Sig Dispense Refill    amLODIPine (NORVASC) 10 MG tablet Take 1 tablet (10 mg total) by mouth once daily. 30 tablet 11    dronabinol (MARINOL) 5 MG capsule Take 1 capsule (5 mg total) by mouth 3 (three) times daily before meals. 90 capsule 5    furosemide (LASIX) 40 MG tablet Take 1 tablet (40 mg total) by mouth 2 (two) times daily. 60 tablet 11    hydrALAZINE (APRESOLINE) 50 MG tablet Take 1 tablet (50 mg total) by mouth every 8 (eight) hours. 90 tablet 11    magnesium oxide (MAG-OX) 400 mg tablet Take 1 tablet (400 mg total) by mouth 2 (two) times daily. 60 tablet 11    pantoprazole (PROTONIX) 40 MG tablet Take 1 tablet (40 mg total) by mouth 2 (two) times daily before meals. 60 tablet 11    polyethylene glycol (GLYCOLAX) 17 gram/dose powder Take 17 g by mouth daily as needed. 1700 g 3    potassium chloride SA (K-DUR,KLOR-CON) 20  "MEQ tablet Take 2 tablets (40 mEq total) by mouth once daily. 60 tablet 11    pravastatin (PRAVACHOL) 20 MG tablet Take 1 tablet (20 mg total) by mouth every evening. 30 tablet 11    warfarin (COUMADIN) 2 MG tablet 4mg 12/29 only, followed by weekly dose of 3mg/day, except 2mg on Saturdays 45 tablet 11         Objective:     Vitals:  Temp:  [97.2 °F (36.2 °C)-99.8 °F (37.7 °C)]   Pulse:  [60-89]   Resp:  [16-18]   BP: (58-70)/(0-47)   SpO2:  [97 %-99 %]     BP (!) 64/0 (BP Location: Left arm, Patient Position: Lying)   Pulse 79   Temp 98.6 °F (37 °C) (Oral)   Resp 16   Ht 5' 9" (1.753 m)   Wt 63.5 kg (140 lb)   SpO2 98%   BMI 20.67 kg/m²  I/O's:    Intake/Output Summary (Last 24 hours) at 01/07/18 1128  Last data filed at 01/07/18 0955   Gross per 24 hour   Intake              600 ml   Output             1100 ml   Net             -500 ml       Wt Readings from Last 10 Encounters:   01/07/18 63.5 kg (140 lb)   12/29/17 64 kg (141 lb 1.5 oz)   12/18/17 59.7 kg (131 lb 9.8 oz)   12/06/17 64.4 kg (142 lb)   12/01/17 60.2 kg (132 lb 11.5 oz)   11/28/17 63.5 kg (140 lb)   11/21/17 63.8 kg (140 lb 10.5 oz)   11/08/17 66.7 kg (147 lb)   10/26/17 64.9 kg (143 lb)   10/26/17 69.2 kg (152 lb 8.9 oz)        General Appearance:    Alert, cooperative, no distress, talking   Lungs:     Clear to auscultation bilaterally, respirations unlabored    Heart:    No JVP, Regular rate and rhythm, normal hum of LVAD   Abdomen:     Soft, non-tender, bowel sounds active, no masses, no organomegaly   Extremities:   no edema b/l, pulses +1 b/l     Labs:       Recent Labs  Lab 01/05/18  0510 01/06/18  0412 01/07/18  0556    135* 135*   K 4.0 4.3 4.0    101 102   CO2 27 27 28   BUN 16 19 20   CREATININE 0.9 0.9 1.0   GLU 68* 77 84   ANIONGAP 10 7* 5*       Recent Labs  Lab 01/02/18  2352  01/05/18  0510  01/07/18  0556   AST 17  --  17  --   --    ALT 10  --  8*  --   --    ALKPHOS 61  --  61  --   --    BILITOT 0.3  --  0.4  " --   --    BILIDIR  --   --  0.2  --   --    ALBUMIN 2.9*  --  2.9*  --   --    LDH  --   < > 190  < > 161   < > = values in this interval not displayed.  No results for input(s): PH, PCO2, PO2, HCO3, POCSATURATED in the last 168 hours.     Recent Labs  Lab 01/05/18  0510 01/06/18  0412 01/07/18  0556   WBC 7.50 7.30 5.99   HGB 7.2* 6.6* 7.1*   HCT 24.0* 22.0* 23.3*    204 181   GRAN 70.3  5.3 70.2  5.1 51.6  3.1       Recent Labs  Lab 01/05/18  0510 01/06/18  0412 01/07/18  0556   INR 1.4* 1.5* 1.7*       Recent Labs  Lab 01/05/18  0510   *      Micro:   Blood Cultures  Lab Results   Component Value Date    LABBLOO No Growth to date 01/06/2018    LABBLOO No Growth to date 01/06/2018    LABBLOO  01/05/2018     Gram stain clement bottle: Gram positive cocci in clusters resembling Staph     LABBLOO  01/05/2018     Results called to and read back by:Adalgisa Guadarrama RN 01/06/2018  11:22    LABBLOO  01/05/2018     Gram stain aer bottle: Gram positive cocci in clusters resembling Staph     LABBLOO  01/05/2018     Positive results previously called 01/06/2018  23:47    LABBLOO  01/05/2018     STAPHYLOCOCCUS SPECIES  Identification and susceptibility pending       Urine Cultures  Lab Results   Component Value Date    LABURIN No growth 08/09/2017    LABURIN No growth 08/04/2017    LABURIN  07/24/2017     ENTEROCOCCUS FAECALIS  >100,000 cfu/ml  No other significant isolate       EF   Date Value Ref Range Status   11/21/2017 13 (A) 55 - 65    10/11/2017 10 (A) 55 - 65    09/20/2017 10 (A) 55 - 65    09/11/2017 30 (A) 55 - 65    09/08/2017 10 (A) 55 - 65      Assessment:     67 year old male with NICM s/p LVAD 7/2017, HTN, DLD, recent ICD revision 11/20 with Jacque-Jennifer c/b pocket hematoma and recent GIB requiring transfusion and EGD who was admitted for INR of 1.4; spiked fever to 103 on 1/5; blood cultures obtained and 4/4 bottles with staph species    Plan:      Bacteremia     -continue ID recommendations and  "initiate Vancomycin 1g q12h with trough before 4th dose  -recent ICD revision 11/20 with Jacque-Jennifer c/b pocket hematoma   - blood cultures obtained and 4/4 bottles with staph species  - DLES viewed and no signs of drainage or erythema  - CXR negative  - Continue vancomycin; trough before 4th dose  - F/u on repeat blood cultures  - Source of bacteremia likely from pacer; will likely need GLENN         * Subtherapeutic international normalized ratio (INR)     -Continue UFH bridge/warfarin; INR 1.5 dosed at 5 mg last night  -will continue 5 mg tonight again           LVAD (left ventricular assist device) present     -HM3 implanted 7/2017 as DT. Speed 5200.  -ASA recently stopped due to NSAID induced ulcer and major GI bleed.  -INR goal of 2-3. INR 1.7 today. UFH/warfarin as above.               - home dose of coumadin 3 T/Th/Sat, 2 all other days              - will dose at 5 mg again tonight  -Intermittently pulsatile.  -MAP goal of 60-80.  -decreased amlodipine to 5 mg daily for consistently low BP  -Transitioned from hydralazine to ACE for renal protection   -Lasix 40 mg BID.            Normocytic anemia     -Recent "NSAID induced ulcer" per EGD.   -H/H stable from DC.   -Continue BID PPI .   -Patient not tolerant of oral iron per report. Continue IV iron.      Signed:  Kimmie Solis MD  Cardiology Hospitalist  Pager: [phone]  1/7/2018 11:28 AM    "

## 2018-01-07 NOTE — ASSESSMENT & PLAN NOTE
67 year old male with NICM s/p LVAD 7/2017, HTN, DLD, recent ICD revision 11/20 with Jacque-Jennifer c/b pocket hematoma and recent GIB requiring transfusion and EGD who was admitted for INR of 1.4; spiked fever to 103 on 1/5; blood cultures obtained and 4/4 bottles with staph species:  - DLES viewed and no signs of drainage or erythema  - CXR negative  - pacer pocket with slight swelling but no erythema or fluctuance    Plan:  1. Continue vancomycin; trough before 4th dose  2. F/u on repeat blood cultures  3. Source of bacteremia likely from pacer; will likely need GLENN  4. Will follow

## 2018-01-07 NOTE — PLAN OF CARE
Problem: Patient Care Overview  Goal: Plan of Care Review  Outcome: Ongoing (interventions implemented as appropriate)  Plan of care discussed with patient and wife. LVAD dressing change continues to be done per wife. Temp-99.7 today. Lab called to notify of gram positive cocci  blood cultures that were done 1/5/18. ID consulted and blood cx's done again today. First dose of vanco 1gm ivpb given. 2 view cxr done.  Patient is free of fall/trauma/injury. Denies CP, SOB, or pain/discomfort. All questions addressed. Will continue to monitor closely.

## 2018-01-07 NOTE — PROGRESS NOTES
[facility]  Infectious Disease  Progress Note    Patient Name: Suman Hayden  MRN: [id number]  Admission Date: 1/2/2018  Length of Stay: 5 days  Attending Physician: Mohit Ambrosio MD  Primary Care Provider: Joe Ernst MD    Isolation Status: No active isolations  Assessment/Plan:      Bacteremia due to Staphylococcus    67 year old male with NICM s/p LVAD 7/2017, HTN, DLD, recent ICD revision 11/20 with Jacque-Jennifer c/b pocket hematoma and recent GIB requiring transfusion and EGD who was admitted for INR of 1.4; spiked fever to 103 on 1/5; blood cultures obtained and 4/4 bottles with staph species:  - DLES viewed and no signs of drainage or erythema  - CXR negative  - pacer pocket with slight swelling but no erythema or fluctuance    Plan:  1. Continue vancomycin; trough before 4th dose  2. F/u on repeat blood cultures  3. Source of bacteremia likely from pacer; will likely need GLENN  4. Will follow        AICD (automatic cardioverter/defibrillator) present    - will likely need GLENN          Thank you for your consult. I will follow-up with patient. Please contact us if you have any additional questions.  MELISSA Baron, pager: [phone]  Infectious Disease  [facility]    Subjective:     Principal Problem:Subtherapeutic international normalized ratio (INR)    HPI: This is a 67 year old male with PMH of NICM s/p LVAD 7/2017, HTN, DLD, recent ICD revision 11/20 with Jacque-Jennifer c/b pocket hematoma and recent GIB requiring transfusion and EGD; discharged on 12/22 from that admission. Patient's INR was 1.4 and he was advised to come in for admission. Yesterday, patient spiked a fever of 102.9. Blood cultures were obtained and both sets with GPCs in chains resembling staph. ID consulted for recs.  Patient and wife deny LVAD DLES drainage. His only complaint is a mild cough with some sputum production. He has slight pain over his pacer pocket. Denies recent absesses or boils.    Interval History: feeling better today; blood cultures with staph species; repeats pending; afebrile.     Review of Systems   Constitutional: Negative for chills and fever.   Respiratory: Positive for cough. Negative for shortness of breath.    Cardiovascular: Negative for chest pain and leg swelling.   Gastrointestinal: Negative for abdominal pain, constipation, diarrhea, nausea and vomiting.   Genitourinary: Negative for dysuria, frequency and hematuria.   Musculoskeletal: Negative for back pain and myalgias.   Skin: Negative for rash and wound.   Neurological: Negative for dizziness, light-headedness and headaches.   Psychiatric/Behavioral: Negative for agitation and behavioral problems. The patient is not nervous/anxious.      Objective:     Vital Signs (Most Recent):  Temp: 98.6 °F (37 °C) (01/07/18 0811)  Pulse: 80 (01/07/18 0811)  Resp: 16 (01/07/18 0811)  BP: (!) 64/0 (01/07/18 0811)  SpO2: 98 % (01/07/18 0811) Vital Signs (24h Range):  Temp:  [97.2 °F (36.2 °C)-99.8 °F (37.7 °C)] 98.6 °F (37 °C)  Pulse:  [60-93] 80  Resp:  [16-18] 16  SpO2:  [97 %-99 %] 98 %  BP: (58-70)/(0-47) 64/0     Weight: 63.5 kg (140 lb)  Body mass index is 20.67 kg/m².    Estimated Creatinine Clearance: 64.4 mL/min (based on SCr of 1 mg/dL).    Physical Exam   Constitutional: He is oriented to person, place, and time. He appears well-developed and well-nourished. No distress.   Cardiovascular: Normal rate and regular rhythm.    No murmur heard.  VAD hum   Pulmonary/Chest: Effort normal and breath sounds normal. No respiratory distress.   Abdominal: Soft. Bowel sounds are normal. He exhibits no distension.       Musculoskeletal: Normal range of motion. He exhibits no edema.   Neurological: He is alert and oriented to person, place, and time.   Skin: Skin is warm and dry.        Psychiatric: He has a normal mood and affect. His behavior is normal.       Significant Labs:   Blood Culture:   Recent Labs  Lab 08/29/17  0836  01/05/18  1605 01/05/18  1606 01/06/18  1310 01/06/18  1347   LABBLOO No growth after 5 days. Gram stain clement bottle: Gram positive cocci in clusters resembling Staph   Results called to and read back by:Adalgisa Guadarrama RN 01/06/2018  11:21  Gram stain aer bottle: Gram positive cocci in clusters resembling Staph   Positive results previously called 01/06/2018  20:22  STAPHYLOCOCCUS SPECIESIdentification and susceptibility pending Gram stain clement bottle: Gram positive cocci in clusters resembling Staph   Results called to and read back by:Adalgisa Guadarrama RN 01/06/2018  11:22  Gram stain aer bottle: Gram positive cocci in clusters resembling Staph   Positive results previously called 01/06/2018  23:47  STAPHYLOCOCCUS SPECIESIdentification and susceptibility pending No Growth to date No Growth to date     CBC:   Recent Labs  Lab 01/06/18  0412 01/07/18  0556   WBC 7.30 5.99   HGB 6.6* 7.1*   HCT 22.0* 23.3*    181     CMP:   Recent Labs  Lab 01/06/18  0412 01/07/18  0556   * 135*   K 4.3 4.0    102   CO2 27 28   GLU 77 84   BUN 19 20   CREATININE 0.9 1.0   CALCIUM 8.1* 8.8   ANIONGAP 7* 5*   EGFRNONAA >60.0 >60.0       Significant Imaging: I have reviewed all pertinent imaging results/findings within the past 24 hours.

## 2018-01-07 NOTE — PROGRESS NOTES
01/06/18 2100 01/06/18 2101   Vital Signs   BP (!) 64/47 (!) 62/0   MAP (mmHg) 52 --    BP Location Right arm Right arm   BP Method Automatic Doppler   Patient Position Lying Lying     MD Cas notified. Pt is asymptomatic. No new orders received. Will continue to monitor. Charge nurse and Liz LVAD coordinator on call, updated. Will continue to monitor.

## 2018-01-07 NOTE — SUBJECTIVE & OBJECTIVE
Interval History: feeling better today; blood cultures with staph species; repeats pending; afebrile.     Review of Systems   Constitutional: Negative for chills and fever.   Respiratory: Positive for cough. Negative for shortness of breath.    Cardiovascular: Negative for chest pain and leg swelling.   Gastrointestinal: Negative for abdominal pain, constipation, diarrhea, nausea and vomiting.   Genitourinary: Negative for dysuria, frequency and hematuria.   Musculoskeletal: Negative for back pain and myalgias.   Skin: Negative for rash and wound.   Neurological: Negative for dizziness, light-headedness and headaches.   Psychiatric/Behavioral: Negative for agitation and behavioral problems. The patient is not nervous/anxious.      Objective:     Vital Signs (Most Recent):  Temp: 98.6 °F (37 °C) (01/07/18 0811)  Pulse: 80 (01/07/18 0811)  Resp: 16 (01/07/18 0811)  BP: (!) 64/0 (01/07/18 0811)  SpO2: 98 % (01/07/18 0811) Vital Signs (24h Range):  Temp:  [97.2 °F (36.2 °C)-99.8 °F (37.7 °C)] 98.6 °F (37 °C)  Pulse:  [60-93] 80  Resp:  [16-18] 16  SpO2:  [97 %-99 %] 98 %  BP: (58-70)/(0-47) 64/0     Weight: 63.5 kg (140 lb)  Body mass index is 20.67 kg/m².    Estimated Creatinine Clearance: 64.4 mL/min (based on SCr of 1 mg/dL).    Physical Exam   Constitutional: He is oriented to person, place, and time. He appears well-developed and well-nourished. No distress.   Cardiovascular: Normal rate and regular rhythm.    No murmur heard.  VAD hum   Pulmonary/Chest: Effort normal and breath sounds normal. No respiratory distress.   Abdominal: Soft. Bowel sounds are normal. He exhibits no distension.       Musculoskeletal: Normal range of motion. He exhibits no edema.   Neurological: He is alert and oriented to person, place, and time.   Skin: Skin is warm and dry.        Psychiatric: He has a normal mood and affect. His behavior is normal.       Significant Labs:   Blood Culture:   Recent Labs  Lab 08/29/17  0836  01/05/18  1605 01/05/18  1606 01/06/18  1310 01/06/18  1347   LABBLOO No growth after 5 days. Gram stain clement bottle: Gram positive cocci in clusters resembling Staph   Results called to and read back by:Adalgisa Guadarrama RN 01/06/2018  11:21  Gram stain aer bottle: Gram positive cocci in clusters resembling Staph   Positive results previously called 01/06/2018  20:22  STAPHYLOCOCCUS SPECIESIdentification and susceptibility pending Gram stain clement bottle: Gram positive cocci in clusters resembling Staph   Results called to and read back by:Adalgisa Guadarrama RN 01/06/2018  11:22  Gram stain aer bottle: Gram positive cocci in clusters resembling Staph   Positive results previously called 01/06/2018  23:47  STAPHYLOCOCCUS SPECIESIdentification and susceptibility pending No Growth to date No Growth to date     CBC:   Recent Labs  Lab 01/06/18  0412 01/07/18  0556   WBC 7.30 5.99   HGB 6.6* 7.1*   HCT 22.0* 23.3*    181     CMP:   Recent Labs  Lab 01/06/18  0412 01/07/18  0556   * 135*   K 4.3 4.0    102   CO2 27 28   GLU 77 84   BUN 19 20   CREATININE 0.9 1.0   CALCIUM 8.1* 8.8   ANIONGAP 7* 5*   EGFRNONAA >60.0 >60.0       Significant Imaging: I have reviewed all pertinent imaging results/findings within the past 24 hours.

## 2018-01-08 PROBLEM — B95.7 STAPHYLOCOCCUS EPIDERMIDIS BACTEREMIA: Status: ACTIVE | Noted: 2018-01-01

## 2018-01-08 NOTE — PHYSICIAN QUERY
"PT Name: Suman Hayden  MR #: [id number]    Physician Query Form - Hematology Clarification      CDS/: Jessica Wilson RN, CDI              Contact information:[phone]    This form is a permanent document in the medical record.      Query Date: January 8, 2018    By submitting this query, we are merely seeking further clarification of documentation. Please utilize your independent clinical judgment when addressing the question(s) below.    The Medical record contains the following:   Indicators  Supporting Clinical Findings Location in Medical Record   x "Anemia" documented Normocytic anemia  HnH with latest Hgb of 7.8 g - at baseline.   - Asymptomatic; no need for transfusion.   - Will monitor closely.   - Low Iron as of 12/15/2017   - To check TIBC and start oral Iron,  ( Can consider IV Iron therapy for a few days)    H&P      x H & H = Hgb = 7.8-> 6.9-> 6.9-> 7.2--> 6.6    Hct = 27-> 22->  22.8->  24-> 22   Labs 1/2, 1/3, 1/4, 1/5, 1/6    BP =                     HR=      "GI bleeding" documented      Acute bleeding (Non GI site)      Transfusion(s)     x Treatment: Ferric gluconate IVPB   MAR 1/3, 1/5     x Other:  recent ICD revision on 11/20/17 with Dr. Vidal (DC ICD placed with active RA/LV  leads (RV lead capped during revision) that was complicated by pocket hematoma and recent GIB bleed due to ileal (NSAID induced) ulcer requiring PRBC transfusion and EGD for which he was recently discharged on 12/22/17. ASA stopped at time and PPI increased  to BID at time of discharge on 12/29/2017.    H&P 1/3     Provider, please specify diagnosis or diagnoses associated with above clinical findings.      [  ] Acute blood loss anemia    [x  ] Iron deficiency anemia    [  ] Chronic blood loss anemia      [  ] Anemia of chronic disease ( Specify chronic disease)      [  ] Other (Specify) _______________________________     [  ] Clinically Undetermined     [  ] Other Hematological Diagnosis (please specify): " _________________________________    [  ] Clinically Undetermined       Please document in your progress notes daily for the duration of treatment, until resolved, and include in your discharge summary.

## 2018-01-08 NOTE — PROGRESS NOTES
[facility]  Heart Transplant  Progress Note    Patient Name: Suman Hayden  MRN: [id number]  Admission Date: 1/2/2018  Hospital Length of Stay: 6 days  Attending Physician: Mohit Ambrosio MD  Primary Care Provider: Joe Ernst MD  Principal Problem:Subtherapeutic international normalized ratio (INR)    Subjective:     Interval History: No complaints.     Continuous Infusions:   heparin (porcine) in D5W 17 Units/kg/hr (01/07/18 1651)     Scheduled Meds:   amLODIPine  5 mg Oral Daily    dronabinol  5 mg Oral TID AC    furosemide  40 mg Oral BID    lisinopril  5 mg Oral Daily    magnesium oxide  400 mg Oral BID    pantoprazole  40 mg Oral BID AC    polyethylene glycol  17 g Oral Daily    potassium chloride  60 mEq Oral Daily    pravastatin  20 mg Oral QHS    sodium chloride 0.9%  3 mL Intravenous Q8H    vancomycin (VANCOCIN) IVPB  1,000 mg Intravenous Q12H    warfarin  5 mg Oral Daily     PRN Meds:    Review of patient's allergies indicates:  No Known Allergies  Objective:     Vital Signs (Most Recent):  Temp: 98.1 °F (36.7 °C) (01/08/18 0400)  Pulse: 79 (01/08/18 0700)  Resp: 16 (01/08/18 0400)  BP: (!) 88/0 (01/08/18 0401)  SpO2: 99 % (01/08/18 0400) Vital Signs (24h Range):  Temp:  [98 °F (36.7 °C)-99 °F (37.2 °C)] 98.1 °F (36.7 °C)  Pulse:  [74-80] 79  Resp:  [16-18] 16  SpO2:  [98 %-99 %] 99 %  BP: (64-93)/(0-65) 88/0     Patient Vitals for the past 72 hrs (Last 3 readings):   Weight   01/07/18 0700 63.5 kg (140 lb)   01/06/18 0725 63.4 kg (139 lb 12.4 oz)   01/05/18 0822 64.1 kg (141 lb 5 oz)     Body mass index is 20.67 kg/m².      Intake/Output Summary (Last 24 hours) at 01/08/18 0819  Last data filed at 01/08/18 0400   Gross per 24 hour   Intake           1405.6 ml   Output             3750 ml   Net          -2344.4 ml     Telemetry: nsr    Physical Exam   Constitutional: He is oriented to person, place, and time. He appears well-developed and well-nourished.   HENT:    Head: Normocephalic.   Eyes: Pupils are equal, round, and reactive to light.   Neck: Normal range of motion. Neck supple.   Cardiovascular: Normal rate and regular rhythm.    VAD hum.   Pulmonary/Chest: Effort normal and breath sounds normal.   Abdominal: Soft. Bowel sounds are normal.   Musculoskeletal: Normal range of motion.   Neurological: He is alert and oriented to person, place, and time.   Skin: Skin is warm and dry.   Psychiatric: He has a normal mood and affect. His behavior is normal.   Nursing note and vitals reviewed.      Significant Labs:  CBC:    Recent Labs  Lab 01/06/18  0412 01/07/18  0556 01/08/18  0600   WBC 7.30 5.99 4.53   RBC 2.64* 2.76* 2.98*   HGB 6.6* 7.1* 7.7*   HCT 22.0* 23.3* 25.2*    181 201   MCV 83 84 85   MCH 25.0* 25.7* 25.8*   MCHC 30.0* 30.5* 30.6*     BNP:    Recent Labs  Lab 01/05/18  0510 01/08/18  0559   * 199*     CMP:    Recent Labs  Lab 01/02/18  2352  01/05/18  0510 01/06/18  0412 01/07/18  0556 01/08/18  0559 01/08/18  0600   GLU 93  < > 68* 77 84 82  --    CALCIUM 9.3  < > 9.2 8.1* 8.8 9.0  --    ALBUMIN 2.9*  --  2.9*  --   --   --  2.9*   PROT 6.3  --  6.4  --   --   --  6.4     < > 138 135* 135* 135*  --    K 3.4*  < > 4.0 4.3 4.0 4.4  --    CO2 29  < > 27 27 28 27  --      < > 101 101 102 102  --    BUN 23  < > 16 19 20 16  --    CREATININE 1.0  < > 0.9 0.9 1.0 0.8  --    ALKPHOS 61  --  61  --   --   --  67   ALT 10  --  8*  --   --   --  16   AST 17  --  17  --   --   --  22   BILITOT 0.3  --  0.4  --   --   --  0.3   < > = values in this interval not displayed.   Coagulation:     Recent Labs  Lab 01/06/18  0412 01/07/18  0556 01/08/18  0600   INR 1.5* 1.7* 1.8*     LDH:    Recent Labs  Lab 01/06/18  0412 01/07/18  0556 01/08/18  0600    161 160     Microbiology:  Microbiology Results (last 7 days)     Procedure Component Value Units Date/Time    Blood culture [[id number]] Collected:  01/06/18 1347    Order Status:  Completed  Specimen:  Blood Updated:  01/08/18 0817     Blood Culture, Routine Gram stain clement bottle: Gram positive cocci in clusters resembling Staph      Blood Culture, Routine Results called to and read back by:Adalgisa Guadarrama RN 01/07/2018  11:35     Blood Culture, Routine Gram stain aer bottle: Gram positive cocci in clusters resembling Staph     Blood Culture, Routine Results called to and read back by:Adalgisa Guadarrama RN 01/07/2018  14:11     Blood Culture, Routine --     STAPHYLOCOCCUS SPECIES  Identification and susceptibility pending      Blood culture [[id number]]     Order Status:  Sent Specimen:  Blood     Blood culture [[id number]]     Order Status:  Sent Specimen:  Blood     Blood culture [[id number]] Collected:  01/06/18 1310    Order Status:  Completed Specimen:  Blood Updated:  01/07/18 1413     Blood Culture, Routine Gram stain clement bottle: Gram positive cocci in clusters resembling Staph      Blood Culture, Routine Results called to and read back by:Adalgisa Guadarrama RN 01/07/2018  11:34     Blood Culture, Routine Gram stain aer bottle: Gram positive cocci in clusters resembling Staph      Blood Culture, Routine Results called to and read back by:Adalgisa Guadarrama RN 01/07/2018  14:12    Narrative:       From 2 different sites 30 minutes apart    Blood culture [[id number]] Collected:  01/05/18 1606    Order Status:  Completed Specimen:  Blood Updated:  01/07/18 1152     Blood Culture, Routine Gram stain clement bottle: Gram positive cocci in clusters resembling Staph      Blood Culture, Routine Results called to and read back by:Adalgisa Guadarrama RN 01/06/2018  11:22     Blood Culture, Routine Gram stain aer bottle: Gram positive cocci in clusters resembling Staph      Blood Culture, Routine Positive results previously called 01/06/2018  23:47     Blood Culture, Routine --     STAPHYLOCOCCUS EPIDERMIDIS  Susceptibility pending      Blood culture [[id number]] Collected:  01/05/18 1605    Order Status:  Completed Specimen:   "Blood Updated:  01/07/18 1152     Blood Culture, Routine Gram stain clement bottle: Gram positive cocci in clusters resembling Staph      Blood Culture, Routine Results called to and read back by:Adalgisa Guadarrama RN 01/06/2018  11:21     Blood Culture, Routine Gram stain aer bottle: Gram positive cocci in clusters resembling Staph      Blood Culture, Routine Positive results previously called 01/06/2018  20:22     Blood Culture, Routine --     STAPHYLOCOCCUS EPIDERMIDIS  Susceptibility pending          I have reviewed all pertinent labs within the past 24 hours.    Estimated Creatinine Clearance: 80.5 mL/min (based on SCr of 0.8 mg/dL).    Diagnostic Results:  I have reviewed all pertinent imaging results/findings within the past 24 hours.    Assessment and Plan:     No notes on file    * Subtherapeutic international normalized ratio (INR)    -Continue UFH bridge/warfarin        LVAD (left ventricular assist device) present    -HM3 implanted 7/2017 as DT. Speed 5200.  -ASA recently stopped due to NSAID induced ulcer and major GI bleed.  -INR goal of 2-3. INR 1.8 today. UFH/warfarin as above.   - home dose of coumadin 3 T/Th/Sat, 2 all other days  -Intermittently pulsatile.  -MAP goal of 60-80.  -Continue Amlodipine 10mg. Transitioned from hydralazine to ACE for renal protection   -Lasix 40 mg BID.        Normocytic anemia    -Recent "NSAID induced ulcer" per EGD.   -H/H stable from DC.   -Continue BID PPI .  -Patient not tolerant of oral iron per report. Tolerating IV iron well without symptoms.           Bacteremia due to Staphylococcus    - Appreciate ID recs. May need GLENN. Re culture today.   - recent ICD revision 11/20 with Jacque-Jennifer c/b pocket hematoma   - blood cultures obtained 1/6 and 4/4 bottles with staph species  - Will re culture today.   - DLES viewed and no signs of drainage or erythema  - Continue vancomycin; trough before 4th dose  - F/u on repeat blood cultures            Kishore Paz NP  Heart " Transplant  [facility]

## 2018-01-08 NOTE — PHYSICIAN QUERY
PT Name: Smuan Hayden  MR #: [id number]    Physician Query Form - Atrial Fibrillation Specificity     CDS/: Jessica Wilson RN, CDI             Contact information: [phone]     This form is a permanent document in the medical record.     Query Date: January 8, 2018    By submitting this query, we are merely seeking further clarification of documentation. Please utilize your independent clinical judgment when addressing the question(s) below.    The medical record contains the following:   Indicators     Supporting Clinical Findings Location in Medical Record   x Atrial Fibrillation Atrial fibrillation - [facility] 3   - Currently rate controlled   - Continue anticoagulation as above.    H&P 1/8    EKG results     x Medication Pt has been on coumadin for LVAD  H&P 1/8    Treatment      Other         Provider, please further specify the Atrial Fibrillation diagnosis.    [  ] Chronic  [x  ] Paroxysmal  [  ] Permanent  [  ] Other (please specify): ____________________________  [  ] Clinically Undetermined    Please document in your progress notes daily for the duration of treatment until resolved, and include in your discharge summary.

## 2018-01-08 NOTE — PLAN OF CARE
Problem: Patient Care Overview  Goal: Plan of Care Review  Outcome: Ongoing (interventions implemented as appropriate)  Heparin gtt continues to infuse for subtherapeutic INR, Anti-Xa monitored per nomogram and adjusted per order set. Vanc trough collected tonight per MD order.  IV Vanc given overnight per MD order.  Pt educated on fall risks overnight, Pt remained free from falls/trauma/injury. VSS.  LVAD hum smooth. LVAD numbers and dopplers WDL. Denies any CP, SOB, palpitations, dizziness, pain and discomfort. Turning/repositioning independently in bed. Plan of care reviewed with patient and all questions answered, verbalizes understanding. No acute distress noted. Will continue to monitor.

## 2018-01-08 NOTE — PROGRESS NOTES
UPDATE    SW to pt's room for update today.  Pt presents as sitting up in bedside chair with wife Kia in room.  Pt and wife both present as aao x4, calm, cooperative, and asking and answering questions appropriately.  Pt reports feeling well today.  Pt and wife report they are hopeful pt will be able to d/c in a few days, once INR is therapeutic and if cultures remain clear.  Pt and wife both report coping adequately at this time, and deny any needs or concerns to SW.  SW providing ongoing psychosocial and counseling support, education, resources, assistance, and discharge planning as indicated.  SW following and remains available.

## 2018-01-08 NOTE — PROGRESS NOTES
LVAD dressing changed per pt's wife. No complications noted. Next dressing change due 1/9/18. Will continue to monitor pt.

## 2018-01-08 NOTE — ASSESSMENT & PLAN NOTE
-HM3 implanted 7/2017 as DT. Speed 5200.  -ASA recently stopped due to NSAID induced ulcer and major GI bleed.  -INR goal of 2-3. INR 1.8 today. UFH/warfarin as above.   - home dose of coumadin 3 T/Th/Sat, 2 all other days  -Intermittently pulsatile.  -MAP goal of 60-80.  -Continue Amlodipine 10mg. Transitioned from hydralazine to ACE for renal protection   -Lasix 40 mg BID.

## 2018-01-08 NOTE — PLAN OF CARE
Problem: Patient Care Overview  Goal: Individualization & Mutuality  Plan of care discussed with patient.  Patient ambulating with assistance, fall precautions in place. LVAD DP and numbers WNL, smooth LVAD hum. Patient has no complaints of pain. Discussed medications and care. Heparin gtt continued, INR=1.8. VSS. AAOx4.  New set Blood cultures drawn this AM. Patient has no questions at this time. Will continue to monitor.

## 2018-01-08 NOTE — SUBJECTIVE & OBJECTIVE
Interval History: No complaints.     Continuous Infusions:   heparin (porcine) in D5W 17 Units/kg/hr (01/07/18 1651)     Scheduled Meds:   amLODIPine  5 mg Oral Daily    dronabinol  5 mg Oral TID AC    furosemide  40 mg Oral BID    lisinopril  5 mg Oral Daily    magnesium oxide  400 mg Oral BID    pantoprazole  40 mg Oral BID AC    polyethylene glycol  17 g Oral Daily    potassium chloride  60 mEq Oral Daily    pravastatin  20 mg Oral QHS    sodium chloride 0.9%  3 mL Intravenous Q8H    vancomycin (VANCOCIN) IVPB  1,000 mg Intravenous Q12H    warfarin  5 mg Oral Daily     PRN Meds:    Review of patient's allergies indicates:  No Known Allergies  Objective:     Vital Signs (Most Recent):  Temp: 98.1 °F (36.7 °C) (01/08/18 0400)  Pulse: 79 (01/08/18 0700)  Resp: 16 (01/08/18 0400)  BP: (!) 88/0 (01/08/18 0401)  SpO2: 99 % (01/08/18 0400) Vital Signs (24h Range):  Temp:  [98 °F (36.7 °C)-99 °F (37.2 °C)] 98.1 °F (36.7 °C)  Pulse:  [74-80] 79  Resp:  [16-18] 16  SpO2:  [98 %-99 %] 99 %  BP: (64-93)/(0-65) 88/0     Patient Vitals for the past 72 hrs (Last 3 readings):   Weight   01/07/18 0700 63.5 kg (140 lb)   01/06/18 0725 63.4 kg (139 lb 12.4 oz)   01/05/18 0822 64.1 kg (141 lb 5 oz)     Body mass index is 20.67 kg/m².      Intake/Output Summary (Last 24 hours) at 01/08/18 0819  Last data filed at 01/08/18 0400   Gross per 24 hour   Intake           1405.6 ml   Output             3750 ml   Net          -2344.4 ml     Telemetry: nsr    Physical Exam   Constitutional: He is oriented to person, place, and time. He appears well-developed and well-nourished.   HENT:   Head: Normocephalic.   Eyes: Pupils are equal, round, and reactive to light.   Neck: Normal range of motion. Neck supple.   Cardiovascular: Normal rate and regular rhythm.    VAD hum.   Pulmonary/Chest: Effort normal and breath sounds normal.   Abdominal: Soft. Bowel sounds are normal.   Musculoskeletal: Normal range of motion.   Neurological: He  is alert and oriented to person, place, and time.   Skin: Skin is warm and dry.   Psychiatric: He has a normal mood and affect. His behavior is normal.   Nursing note and vitals reviewed.      Significant Labs:  CBC:    Recent Labs  Lab 01/06/18 0412 01/07/18 0556 01/08/18  0600   WBC 7.30 5.99 4.53   RBC 2.64* 2.76* 2.98*   HGB 6.6* 7.1* 7.7*   HCT 22.0* 23.3* 25.2*    181 201   MCV 83 84 85   MCH 25.0* 25.7* 25.8*   MCHC 30.0* 30.5* 30.6*     BNP:    Recent Labs  Lab 01/05/18 0510 01/08/18  0559   * 199*     CMP:    Recent Labs  Lab 01/02/18  2352  01/05/18 0510 01/06/18 0412 01/07/18 0556 01/08/18  0559 01/08/18  0600   GLU 93  < > 68* 77 84 82  --    CALCIUM 9.3  < > 9.2 8.1* 8.8 9.0  --    ALBUMIN 2.9*  --  2.9*  --   --   --  2.9*   PROT 6.3  --  6.4  --   --   --  6.4     < > 138 135* 135* 135*  --    K 3.4*  < > 4.0 4.3 4.0 4.4  --    CO2 29  < > 27 27 28 27  --      < > 101 101 102 102  --    BUN 23  < > 16 19 20 16  --    CREATININE 1.0  < > 0.9 0.9 1.0 0.8  --    ALKPHOS 61  --  61  --   --   --  67   ALT 10  --  8*  --   --   --  16   AST 17  --  17  --   --   --  22   BILITOT 0.3  --  0.4  --   --   --  0.3   < > = values in this interval not displayed.   Coagulation:     Recent Labs  Lab 01/06/18 0412 01/07/18  0556 01/08/18  0600   INR 1.5* 1.7* 1.8*     LDH:    Recent Labs  Lab 01/06/18 0412 01/07/18  0556 01/08/18  0600    161 160     Microbiology:  Microbiology Results (last 7 days)     Procedure Component Value Units Date/Time    Blood culture [[id number]] Collected:  01/06/18 1347    Order Status:  Completed Specimen:  Blood Updated:  01/08/18 0817     Blood Culture, Routine Gram stain clement bottle: Gram positive cocci in clusters resembling Staph      Blood Culture, Routine Results called to and read back by:Adalgisa Guadarrama RN 01/07/2018  11:35     Blood Culture, Routine Gram stain aer bottle: Gram positive cocci in clusters resembling Staph     Blood  Culture, Routine Results called to and read back by:Adalgisa Guadarrama RN 01/07/2018  14:11     Blood Culture, Routine --     STAPHYLOCOCCUS SPECIES  Identification and susceptibility pending      Blood culture [[id number]]     Order Status:  Sent Specimen:  Blood     Blood culture [[id number]]     Order Status:  Sent Specimen:  Blood     Blood culture [[id number]] Collected:  01/06/18 1310    Order Status:  Completed Specimen:  Blood Updated:  01/07/18 1413     Blood Culture, Routine Gram stain clement bottle: Gram positive cocci in clusters resembling Staph      Blood Culture, Routine Results called to and read back by:Adalgisa Guadarrama RN 01/07/2018  11:34     Blood Culture, Routine Gram stain aer bottle: Gram positive cocci in clusters resembling Staph      Blood Culture, Routine Results called to and read back by:Adalgisa Guadarrama RN 01/07/2018  14:12    Narrative:       From 2 different sites 30 minutes apart    Blood culture [[id number]] Collected:  01/05/18 1606    Order Status:  Completed Specimen:  Blood Updated:  01/07/18 1152     Blood Culture, Routine Gram stain clement bottle: Gram positive cocci in clusters resembling Staph      Blood Culture, Routine Results called to and read back by:Adalgisa Guadarrama RN 01/06/2018  11:22     Blood Culture, Routine Gram stain aer bottle: Gram positive cocci in clusters resembling Staph      Blood Culture, Routine Positive results previously called 01/06/2018  23:47     Blood Culture, Routine --     STAPHYLOCOCCUS EPIDERMIDIS  Susceptibility pending      Blood culture [[id number]] Collected:  01/05/18 1605    Order Status:  Completed Specimen:  Blood Updated:  01/07/18 1152     Blood Culture, Routine Gram stain clement bottle: Gram positive cocci in clusters resembling Staph      Blood Culture, Routine Results called to and read back by:Adalgisa Guadarrama RN 01/06/2018  11:21     Blood Culture, Routine Gram stain aer bottle: Gram positive cocci in clusters resembling Staph      Blood Culture,  Routine Positive results previously called 01/06/2018  20:22     Blood Culture, Routine --     STAPHYLOCOCCUS EPIDERMIDIS  Susceptibility pending          I have reviewed all pertinent labs within the past 24 hours.    Estimated Creatinine Clearance: 80.5 mL/min (based on SCr of 0.8 mg/dL).    Diagnostic Results:  I have reviewed all pertinent imaging results/findings within the past 24 hours.

## 2018-01-09 NOTE — PROGRESS NOTES
LVAD dressing changed per pt's wife. No complications noted. Next dressing change due 1/10/18. Will continue to monitor pt.

## 2018-01-09 NOTE — PLAN OF CARE
[facility]   Heart Transplant/VAD Clinic   [address] [place] [address]   [phone] after hours          [phone] fax     VAD HOME  HEALTH ORDERS    Admit to Home Health    Diagnosis:   Patient Active Problem List   Diagnosis    Nonischemic cardiomyopathy    Encounter for monitoring amiodarone therapy    Essential hypertension    V-tach    Elevated PSA    Hepatitis B core antibody positive since 2012    Chronic systolic congestive heart failure    Heart transplant candidate    Hyperbilirubinemia    Malfunction of implantable cardioverter-defibrillator (ICD) electrode    Syncope and collapse    Atrial tachycardia    Hyperglycemia    AICD (automatic cardioverter/defibrillator) present    LVAD (left ventricular assist device) present    Atrial fibrillation    Heart replaced by heart assist device    Bacteremia    Anticoagulation monitoring, INR range 2-3    Defibrillator discharge    ICD (implantable cardioverter-defibrillator) pocket hematoma    Subtherapeutic international normalized ratio (INR)    Constipation    Normocytic anemia    Anemia    Ileum ulcer    Staphylococcus epidermidis bacteremia       Patient is homebound due to:      Diet: Low Fat, Low cholesterol, 2Gm Na, Coumadin restrictions.    Acitivities: No Swimming, bathing, vacuuming, contact sports.    Fresh implants= Sternal Precautions    Nursing:   SN to complete comprehensive assessment including routine vital signs. Instruct on disease process and s/s of complications to report to MD. Review/verify medication list sent home with the patient at time of discharge  and instruct patient/caregiver as needed. Frequency may be adjusted depending on start of care date.    **LVAD driveline exit site dressing change is to be completed per LVAD patient/caregiver only**.    Notify MD if SBP > 160 or < 90; DBP > 90 or < 50; HR > 120 or < 50; Temp > 101; Weight gain >3lbs in  1 day or 5lbs in 1 week.    LABS:  SN to perform labs: PT/INR per Coumadin clinic [phone].   Follow up INR date: 1/12/18  No Finger Sticks  weekly CBC, CMP, ESR, CRP, and vanc trough faxed to ID clinic at [phone]    HOME INFUSION THERAPY:    SN to perform Infusion Therapy/Central Line Care.  Review Central Line Care & Central Line Flush with patient.    Administer (drug and dose): Vancomycin 1500 mg IV q 24 hours (estimated end date: 2/19/18)    Central line care:  Scrub the Hub: Prior to accessing the line, always perform a 30 second alcohol scrub  Each lumen of the central line is to be flushed at least daily with 10 mL Normal Saline and 3 mL Heparin flush (100 units/mL)  Skilled Nurse (SN) may draw blood from IV access  Blood Draw Procedure:   - Aspirate at least 5 mL of blood   - Discard   - Obtain specimen   - Change posiflow cap   - Flush with 20 mL Normal Saline followed by a                 3-5 mL Heparin flush (100 units/mL)  Central :   - Sterile dressing changes are done weekly and as needed.   - Use chlor-hexadine scrub to cleanse site, apply Biopatch to insertion site,       apply securement device dressing   - Posi-flow caps are changed weekly and after EVERY lab draw.   - If sterile gauze is under dressing to control oozing,                 dressing change must be performed every 24 hours until gauze is not needed.    Send initial Home Health orders to [facility] attending physician on call.  Send follow up questions to VAD clinic MD [phone] or [phone].

## 2018-01-09 NOTE — PROGRESS NOTES
Vanc trough shows up as being collected, but lab has no sample. Vanc trough reordered to be collected by lab.

## 2018-01-09 NOTE — PROGRESS NOTES
Pt returned to room. VSS. AAOx4. Bed in lowest position, call bell within reach and side rails up x2. Will continue to monitor pt.

## 2018-01-09 NOTE — NURSING TRANSFER
Nursing Transfer Note      1/9/2018     Transfer 310    Transfer via stretcher    Transfer with cardiac monitoring; emergency bag; power module    Transported by RN and PCT    Medicines sent: none    Chart send with patient: Yes    Notified: spouse    Patient reassessed at: 1/9/18 at 1000    Report given to ORIANA Gonsalez. Pt AAOx4. VSS. No distress noted.

## 2018-01-09 NOTE — TRANSFER OF CARE
"Anesthesia Transfer of Care Note    Patient: Suman Hayden    Procedure(s) Performed: Procedure(s) (LRB):  TRANSESOPHAGEAL ECHOCARDIOGRAM (GLENN) (N/A)    Patient location: PACU    Anesthesia Type: general and MAC    Transport from OR: Transported from OR on 2-3 L/min O2 by NC with adequate spontaneous ventilation    Post pain: adequate analgesia    Post assessment: no apparent anesthetic complications    Post vital signs: stable    Level of consciousness: awake, alert and oriented    Nausea/Vomiting: no nausea/vomiting    Complications: none    Transfer of care protocol was followed      Last vitals:   Visit Vitals  /80   Pulse 79   Temp 36.6 °C (97.9 °F) (Oral)   Resp 16   Ht 5' 9" (1.753 m)   Wt 64 kg (141 lb 1.5 oz)   SpO2 97%   BMI 20.84 kg/m²     "

## 2018-01-09 NOTE — PROGRESS NOTES
[facility]  Heart Transplant  Progress Note    Patient Name: Suman Hayden  MRN: [id number]  Admission Date: 1/2/2018  Hospital Length of Stay: 7 days  Attending Physician: Brenden Veras MD  Primary Care Provider: Joe Ernst MD  Principal Problem:Subtherapeutic international normalized ratio (INR)    Subjective:     Interval History: No complaints.     Continuous Infusions:   heparin (porcine) in D5W 17 Units/kg/hr (01/08/18 1207)     Scheduled Meds:   amLODIPine  5 mg Oral Daily    dronabinol  5 mg Oral TID AC    furosemide  40 mg Oral BID    lisinopril  5 mg Oral Daily    magnesium oxide  400 mg Oral BID    pantoprazole  40 mg Oral BID AC    polyethylene glycol  17 g Oral Daily    pravastatin  20 mg Oral QHS    sodium chloride 0.9%  3 mL Intravenous Q8H    vancomycin (VANCOCIN) IVPB  1,000 mg Intravenous Q12H     PRN Meds:    Review of patient's allergies indicates:  No Known Allergies  Objective:     Vital Signs (Most Recent):  Temp: 97.7 °F (36.5 °C) (01/09/18 0745)  Pulse: 78 (01/09/18 0745)  Resp: 16 (01/09/18 0745)  BP: (!) 68/0 (01/09/18 0745)  SpO2: 97 % (01/09/18 0745) Vital Signs (24h Range):  Temp:  [97.7 °F (36.5 °C)-98.7 °F (37.1 °C)] 97.7 °F (36.5 °C)  Pulse:  [74-82] 78  Resp:  [16-20] 16  SpO2:  [97 %-100 %] 97 %  BP: (64-70)/(0) 68/0     Patient Vitals for the past 72 hrs (Last 3 readings):   Weight   01/08/18 0700 64 kg (141 lb 1.5 oz)   01/07/18 0700 63.5 kg (140 lb)     Body mass index is 20.84 kg/m².      Intake/Output Summary (Last 24 hours) at 01/09/18 0814  Last data filed at 01/09/18 0600   Gross per 24 hour   Intake              980 ml   Output             2525 ml   Net            -1545 ml     Telemetry: nsr    Physical Exam   Constitutional: He is oriented to person, place, and time. He appears well-developed and well-nourished.   HENT:   Head: Normocephalic.   Eyes: Pupils are equal, round, and reactive to light.   Neck: Normal range of motion.  Neck supple.   Cardiovascular: Normal rate and regular rhythm.    VAD hum.   Pulmonary/Chest: Effort normal and breath sounds normal.   Abdominal: Soft. Bowel sounds are normal.   Musculoskeletal: Normal range of motion.   Neurological: He is alert and oriented to person, place, and time.   Skin: Skin is warm and dry.   Psychiatric: He has a normal mood and affect. His behavior is normal.   Nursing note and vitals reviewed.      Significant Labs:  CBC:    Recent Labs  Lab 01/07/18  0556 01/08/18  0600 01/09/18  0555   WBC 5.99 4.53 5.00   RBC 2.76* 2.98* 3.03*   HGB 7.1* 7.7* 7.9*   HCT 23.3* 25.2* 25.7*    201 217   MCV 84 85 85   MCH 25.7* 25.8* 26.1*   MCHC 30.5* 30.6* 30.7*     BNP:    Recent Labs  Lab 01/05/18  0510 01/08/18  0559   * 199*     CMP:    Recent Labs  Lab 01/02/18  2352  01/05/18  0510  01/07/18  0556 01/08/18  0559 01/08/18  0600 01/09/18  0555   GLU 93  < > 68*  < > 84 82  --  76   CALCIUM 9.3  < > 9.2  < > 8.8 9.0  --  9.1   ALBUMIN 2.9*  --  2.9*  --   --   --  2.9*  --    PROT 6.3  --  6.4  --   --   --  6.4  --      < > 138  < > 135* 135*  --  133*   K 3.4*  < > 4.0  < > 4.0 4.4  --  4.3   CO2 29  < > 27  < > 28 27  --  28     < > 101  < > 102 102  --  100   BUN 23  < > 16  < > 20 16  --  15   CREATININE 1.0  < > 0.9  < > 1.0 0.8  --  0.9   ALKPHOS 61  --  61  --   --   --  67  --    ALT 10  --  8*  --   --   --  16  --    AST 17  --  17  --   --   --  22  --    BILITOT 0.3  --  0.4  --   --   --  0.3  --    < > = values in this interval not displayed.   Coagulation:     Recent Labs  Lab 01/07/18  0556 01/08/18  0600 01/09/18  0555   INR 1.7* 1.8* 2.0*     LDH:    Recent Labs  Lab 01/07/18  0556 01/08/18  0600 01/09/18  0555    160 163     Microbiology:  Microbiology Results (last 7 days)     Procedure Component Value Units Date/Time    Blood culture [[id number]] Collected:  01/08/18 0904    Order Status:  Completed Specimen:  Blood Updated:  01/08/18 1541      Blood Culture, Routine No Growth to date    Blood culture [[id number]] Collected:  01/08/18 0910    Order Status:  Completed Specimen:  Blood Updated:  01/08/18 1545     Blood Culture, Routine No Growth to date    Blood culture [[id number]] Collected:  01/06/18 1310    Order Status:  Completed Specimen:  Blood Updated:  01/08/18 1350     Blood Culture, Routine Gram stain clement bottle: Gram positive cocci in clusters resembling Staph      Blood Culture, Routine Results called to and read back by:Adalgisa Guadarrama RN 01/07/2018  11:34     Blood Culture, Routine Gram stain aer bottle: Gram positive cocci in clusters resembling Staph      Blood Culture, Routine Results called to and read back by:Adalgisa Guadarrama RN 01/07/2018  14:12     Blood Culture, Routine --     STAPHYLOCOCCUS EPIDERMIDIS  Susceptibility pending      Narrative:       From 2 different sites 30 minutes apart    Blood culture [[id number]] Collected:  01/06/18 1347    Order Status:  Completed Specimen:  Blood Updated:  01/08/18 1349     Blood Culture, Routine Gram stain clement bottle: Gram positive cocci in clusters resembling Staph      Blood Culture, Routine Results called to and read back by:Adalgisa Guadarrama RN 01/07/2018  11:35     Blood Culture, Routine Gram stain aer bottle: Gram positive cocci in clusters resembling Staph     Blood Culture, Routine Results called to and read back by:Adalgisa Guadarrama RN 01/07/2018  14:11     Blood Culture, Routine --     STAPHYLOCOCCUS EPIDERMIDIS  Susceptibility pending      Blood culture [[id number]]  (Susceptibility) Collected:  01/05/18 1606    Order Status:  Completed Specimen:  Blood Updated:  01/08/18 0949     Blood Culture, Routine Gram stain clement bottle: Gram positive cocci in clusters resembling Staph      Blood Culture, Routine Results called to and read back by:Adalgisa Guadarrama RN 01/06/2018  11:22     Blood Culture, Routine Gram stain aer bottle: Gram positive cocci in clusters resembling Staph      Blood Culture,  Routine Positive results previously called 01/06/2018  23:47     Blood Culture, Routine STAPHYLOCOCCUS EPIDERMIDIS    Blood culture [[id number]]  (Susceptibility) Collected:  01/05/18 1605    Order Status:  Completed Specimen:  Blood Updated:  01/08/18 0948     Blood Culture, Routine Gram stain clement bottle: Gram positive cocci in clusters resembling Staph      Blood Culture, Routine Results called to and read back by:Adalgisa Guadarrama RN 01/06/2018  11:21     Blood Culture, Routine Gram stain aer bottle: Gram positive cocci in clusters resembling Staph      Blood Culture, Routine Positive results previously called 01/06/2018  20:22     Blood Culture, Routine STAPHYLOCOCCUS EPIDERMIDIS        I have reviewed all pertinent labs within the past 24 hours.    Estimated Creatinine Clearance: 72.1 mL/min (based on SCr of 0.9 mg/dL).    Diagnostic Results:  I have reviewed all pertinent imaging results/findings within the past 24 hours.    Assessment and Plan:     No notes on file    * Subtherapeutic international normalized ratio (INR)    - INR therapeutic. Stop UFH. Continue warfarin.        LVAD (left ventricular assist device) present    -HM3 implanted 7/2017 as DT. Speed 5200.  -ASA recently stopped due to NSAID induced ulcer and major GI bleed.  -INR goal of 2-3. INR therapeutic today. Continue warfarin.   - home dose of coumadin 3 T/Th/Sat, 2 all other days  -Intermittently pulsatile.  -MAP goal of 60-80.  -Continue Amlodipine 10mg. Transitioned from hydralazine to ACE for renal protection   -Lasix 40 mg BID.        Staphylococcus epidermidis bacteremia    - Appreciate ID recs. May need GLENN. Re culture today.   - recent ICD revision 11/20 with Jacque-Jennifer c/b pocket hematoma   - blood cultures obtained 1/6 and 4/4 bottles with staph species  - Re cultured 1/8/17. NGTD.   - DLES viewed and no signs of drainage or erythema  - Continue vancomycin; trough before 4th dose  - GLENN today.          Normocytic anemia    -Recent  ""NSAID induced ulcer" per EGD.   -H/H stable from DC.   -Continue BID PPI .  -Patient not tolerant of oral iron per report. Tolerating IV iron well without symptoms.               Kishore aPz NP  Heart Transplant  [facility]  "

## 2018-01-09 NOTE — ANESTHESIA POSTPROCEDURE EVALUATION
"Anesthesia Post Evaluation    Patient: Suman Hayden    Procedure(s) Performed: Procedure(s) (LRB):  TRANSESOPHAGEAL ECHOCARDIOGRAM (GLENN) (N/A)    Final Anesthesia Type: general  Patient location during evaluation: PACU  Patient participation: Yes- Able to Participate  Level of consciousness: awake and alert  Post-procedure vital signs: reviewed and stable  Pain management: adequate  Airway patency: patent  PONV status at discharge: No PONV  Anesthetic complications: no      Cardiovascular status: blood pressure returned to baseline  Respiratory status: unassisted  Hydration status: euvolemic  Follow-up not needed.        Visit Vitals  BP (!) 74/0 (BP Location: Right arm, Patient Position: Lying)   Pulse 79   Temp 36.9 °C (98.5 °F) (Oral)   Resp 13   Ht 5' 9" (1.753 m)   Wt 64 kg (141 lb 1.5 oz)   SpO2 99%   BMI 20.84 kg/m²       Pain/Gurpreet Score: Pain Assessment Performed: Yes (1/9/2018 10:00 AM)  Presence of Pain: denies (1/9/2018 10:00 AM)  Pain Rating Prior to Med Admin: 0 (1/9/2018 10:00 AM)  Gurpreet Score: 10 (1/9/2018  9:30 AM)      "

## 2018-01-09 NOTE — SUBJECTIVE & OBJECTIVE
Interval History: Afebrile.  WBC wnl.  Denies complaints.  Sitting up eating lunch.    Blood cultures 1/5 and 1/6 now showing Staph Epi.  Repeats 1/8 NGTD.        Review of Systems   Constitutional: Negative for chills and fever.   Respiratory: Negative for cough and shortness of breath.    Cardiovascular: Negative for chest pain and leg swelling.   Gastrointestinal: Negative for abdominal pain, constipation, diarrhea, nausea and vomiting.   Genitourinary: Negative for dysuria, frequency and hematuria.   Musculoskeletal: Negative for back pain and myalgias.   Skin: Negative for rash and wound.   Neurological: Negative for dizziness, light-headedness and headaches.   Psychiatric/Behavioral: Negative for agitation and behavioral problems. The patient is not nervous/anxious.      Objective:     Vital Signs (Most Recent):  Temp: 98 °F (36.7 °C) (01/08/18 1500)  Pulse: 80 (01/08/18 1500)  Resp: 17 (01/08/18 1500)  BP: (!) 64/0 (01/08/18 1500)  SpO2: 97 % (01/08/18 1500) Vital Signs (24h Range):  Temp:  [98 °F (36.7 °C)-98.5 °F (36.9 °C)] 98 °F (36.7 °C)  Pulse:  [78-88] 80  Resp:  [16-18] 17  SpO2:  [97 %-99 %] 97 %  BP: (64-93)/(0-65) 64/0     Weight: 64 kg (141 lb 1.5 oz)  Body mass index is 20.84 kg/m².    Estimated Creatinine Clearance: 81.1 mL/min (based on SCr of 0.8 mg/dL).    Physical Exam   Constitutional: He is oriented to person, place, and time. He appears well-developed and well-nourished. No distress.   Cardiovascular: Normal rate and regular rhythm.    No murmur heard.  VAD hum   Pulmonary/Chest: Effort normal and breath sounds normal. No respiratory distress.   Abdominal: Soft. Bowel sounds are normal. He exhibits no distension.       Musculoskeletal: Normal range of motion. He exhibits no edema.   Neurological: He is alert and oriented to person, place, and time.   Skin: Skin is warm and dry.        Psychiatric: He has a normal mood and affect. His behavior is normal.       Significant Labs:   Blood  Culture:     Recent Labs  Lab 01/05/18  1606 01/06/18  1310 01/06/18  1347 01/08/18  0904 01/08/18  0910   LABBLOO Gram stain clement bottle: Gram positive cocci in clusters resembling Staph   Results called to and read back by:Adalgisa Guadarrama RN 01/06/2018  11:22  Gram stain aer bottle: Gram positive cocci in clusters resembling Staph   Positive results previously called 01/06/2018  23:47  STAPHYLOCOCCUS EPIDERMIDIS Gram stain clement bottle: Gram positive cocci in clusters resembling Staph   Results called to and read back by:Adalgisa Guadarrama RN 01/07/2018  11:34  Gram stain aer bottle: Gram positive cocci in clusters resembling Staph   Results called to and read back by:Adalgisa Guadarrama RN 01/07/2018  14:12  STAPHYLOCOCCUS EPIDERMIDISSusceptibility pending Gram stain clement bottle: Gram positive cocci in clusters resembling Staph   Results called to and read back by:Adalgisa Guadarrama RN 01/07/2018  11:35  Gram stain aer bottle: Gram positive cocci in clusters resembling Staph  Results called to and read back by:Adalgisa Guadarrama RN 01/07/2018  14:11  STAPHYLOCOCCUS EPIDERMIDISSusceptibility pending No Growth to date No Growth to date     CBC:     Recent Labs  Lab 01/07/18  0556 01/08/18  0600   WBC 5.99 4.53   HGB 7.1* 7.7*   HCT 23.3* 25.2*    201     CMP:     Recent Labs  Lab 01/07/18  0556 01/08/18  0559 01/08/18  0600   * 135*  --    K 4.0 4.4  --     102  --    CO2 28 27  --    GLU 84 82  --    BUN 20 16  --    CREATININE 1.0 0.8  --    CALCIUM 8.8 9.0  --    PROT  --   --  6.4   ALBUMIN  --   --  2.9*   BILITOT  --   --  0.3   ALKPHOS  --   --  67   AST  --   --  22   ALT  --   --  16   ANIONGAP 5* 6*  --    EGFRNONAA >60.0 >60.0  --        Significant Imaging: I have reviewed all pertinent imaging results/findings within the past 24 hours.

## 2018-01-09 NOTE — PLAN OF CARE
Problem: Patient Care Overview  Goal: Plan of Care Review  Outcome: Ongoing (interventions implemented as appropriate)  Pt kept NPO since MN for a GLENN in the morning. Heparin gtt continues to infuse for subtherapeutic INR, Anti-Xa monitored per nomogram and adjusted per order set. INR 1.8 today. IV Vanc given overnight per MD order. Pt educated on fall risks overnight, Pt remained free from falls/trauma/injury. VSS.  LVAD hum smooth. LVAD numbers and dopplers WDL. Denies any CP, SOB, palpitations, dizziness, pain and discomfort. Turning/repositioning independently in bed. Plan of care reviewed with patient and all questions answered, verbalizes understanding. No acute distress noted. Will continue to monitor.

## 2018-01-09 NOTE — ASSESSMENT & PLAN NOTE
-HM3 implanted 7/2017 as DT. Speed 5200.  -ASA recently stopped due to NSAID induced ulcer and major GI bleed.  -INR goal of 2-3. INR therapeutic today. Continue warfarin.   - home dose of coumadin 3 T/Th/Sat, 2 all other days  -Intermittently pulsatile.  -MAP goal of 60-80.  -Continue Amlodipine 10mg. Transitioned from hydralazine to ACE for renal protection   -Lasix 40 mg BID.

## 2018-01-09 NOTE — ANESTHESIA PREPROCEDURE EVALUATION
01/09/2018  Suman Hayden is a 67 y.o., male with an LVAD here for GLENN to rule out endocarditis.     Patient Active Problem List   Diagnosis    Nonischemic cardiomyopathy    Encounter for monitoring amiodarone therapy    Essential hypertension    V-tach    Elevated PSA    Hepatitis B core antibody positive since 2012    Chronic systolic congestive heart failure    Heart transplant candidate    Hyperbilirubinemia    Malfunction of implantable cardioverter-defibrillator (ICD) electrode    Syncope and collapse    Atrial tachycardia    Hyperglycemia    AICD (automatic cardioverter/defibrillator) present    LVAD (left ventricular assist device) present    Atrial fibrillation    Heart replaced by heart assist device    Bacteremia    Anticoagulation monitoring, INR range 2-3    Defibrillator discharge    ICD (implantable cardioverter-defibrillator) pocket hematoma    Subtherapeutic international normalized ratio (INR)    Constipation    Normocytic anemia    Anemia    Ileum ulcer    Staphylococcus epidermidis bacteremia       Anesthesia Evaluation    I have reviewed the Patient Summary Reports.    I have reviewed the Nursing Notes.   I have reviewed the Medications.     Review of Systems  Anesthesia Hx:  No problems with previous Anesthesia    Cardiovascular:   Hypertension CHF S/p LVAD   Pulmonary:  Pulmonary Normal    Hepatic/GI:   PUD,    Endocrine:  Endocrine Normal        Physical Exam  General:  Well nourished    Airway/Jaw/Neck:  Airway Findings: Mouth Opening: Normal Tongue: Normal  General Airway Assessment: Adult  Mallampati: II  TM Distance: Normal, at least 6 cm       Chest/Lungs:  Chest/Lungs Findings: Clear to auscultation, Normal Respiratory Rate     Heart/Vascular:  Heart Findings: (LVAD hum)            Anesthesia Plan  Type of Anesthesia, risks & benefits  discussed:  Anesthesia Type:  general, MAC  Patient's Preference:   Intra-op Monitoring Plan: standard ASA monitors  Intra-op Monitoring Plan Comments:   Post Op Pain Control Plan: IV/PO Opioids PRN  Post Op Pain Control Plan Comments:   Induction:   IV  Beta Blocker:  Patient is not currently on a Beta-Blocker (No further documentation required).       Informed Consent: Patient understands risks and agrees with Anesthesia plan.  Questions answered. Anesthesia consent signed with patient.  ASA Score: 4     Day of Surgery Review of History & Physical:            Ready For Surgery From Anesthesia Perspective.

## 2018-01-09 NOTE — PROCEDURES
VAD interrogation completed this AM in the event changes needed to be made. Will continue to monitor for further issues.     Pulsatile: Yes, intermittent   VAD Sounds: HM3  Smooth  Problems / Issues / Alarms with VAD if any: None noted     VAD Interrogation:  TXP LVAD INTERROGATIONS 1/9/2018 1/9/2018 1/9/2018 1/9/2018 1/9/2018 1/9/2018 1/9/2018   Type HeartMate3 HeartMate3 HeartMate3 HeartMate3 HeartMate3 HeartMate3 HeartMate3   Flow - 4.4 4.3 4.0 4.3 4.3 4.5   Speed - 5200 5200 5200 5200 5200 5200   PI - 2.9 3 3.9 3.3 3.2 2.3   Power ([place]) - 3.6 3.6 3.7 3.4 3.6 3.5   LSL - - - - 4800 4800 4800   Pulsatility - - - - - - -   }

## 2018-01-09 NOTE — ASSESSMENT & PLAN NOTE
- Appreciate ID recs. May need GLENN. Re culture today.   - recent ICD revision 11/20 with Jacque-Jennifer c/b pocket hematoma   - blood cultures obtained 1/6 and 4/4 bottles with staph species  - Re cultured 1/8/17. NGTD.   - DLES viewed and no signs of drainage or erythema  - Continue vancomycin; trough before 4th dose  - GLENN today.

## 2018-01-09 NOTE — PROGRESS NOTES
[facility]  Infectious Disease  Progress Note    Patient Name: Suman Hayden  MRN: [id number]  Admission Date: 1/2/2018  Length of Stay: 7 days  Attending Physician: Brenden Veras MD  Primary Care Provider: Joe Ernst MD    Isolation Status: No active isolations  Assessment/Plan:      Staphylococcus epidermidis bacteremia    67 year old male with NICM s/p LVAD 7/2017, HTN, DLD, recent ICD revision 11/20 with Dr. Winchester c/b pocket hematoma and recent GIB requiring transfusion and EGD who was admitted for INR of 1.4; spiked fever to 103 on 1/5; blood cultures obtained and 4/4 bottles positive with staph epi; Repeat cultures 1/6 also positive;   DLES without signs of infection.  Pacer pocket with slight swelling but no erythema, drainage, fluctuance.  Patient afebrile, no leukocytosis. CRP wnl.   - GLENN negative for pacer lead infection or endocarditis  - source likely from pacer pocket?    Plan:  1. Given trough of 23, will re-dose vancomycin to 1500 mg IV q 24 hours (repeat vanc trough before 4th dose)  2. F/u repeat blood cultures  3. Given LVAD and ICD in place, favor a longer course of antibiotic treatment with 6 weeks of IV vancomycin (estimated end date: 2/19/18)  4. Will need weekly CBC, CMP, ESR, CRP, and vanc trough faxed to ID clinic at [phone]  5. Okay to place PICC tomorrow if blood cultures from 1/8 remain no growth  6. ID will sign off but please re-consult if repeat blood cultures from 1/8 are positive   7. Will arrange outpatient follow up with ID            Anticipated Disposition: home with IV antibiotics  Thank you for your consult. I will sign off. Please contact us if you have any additional questions.  MELISSA Baron, pager: [phone]  Infectious Disease  [facility]    Subjective:     Principal Problem:Subtherapeutic international normalized ratio (INR)    HPI: This is a 67 year old male with PMH of NICM s/p LVAD 7/2017, HTN, DLD,  recent ICD revision 11/20 with Jacque-Jennifer c/b pocket hematoma and recent GIB requiring transfusion and EGD; discharged on 12/22 from that admission. Patient's INR was 1.4 and he was advised to come in for admission. Yesterday, patient spiked a fever of 102.9. Blood cultures were obtained and both sets with GPCs in chains resembling staph. ID consulted for recs.  Patient and wife deny LVAD DLES drainage. His only complaint is a mild cough with some sputum production. He has slight pain over his pacer pocket. Denies recent absesses or boils.   Interval History: underwent GLENN this morning; feels great today; states he is hungry. Blood cultures 1/8 NGTD. Afebrile.     Review of Systems   Constitutional: Negative for chills and fever.   Respiratory: Negative for cough and shortness of breath.    Cardiovascular: Negative for chest pain and leg swelling.   Gastrointestinal: Negative for abdominal pain, constipation, diarrhea, nausea and vomiting.   Genitourinary: Negative for dysuria, frequency and hematuria.   Musculoskeletal: Negative for back pain and myalgias.   Skin: Negative for rash and wound.   Neurological: Negative for dizziness, light-headedness and headaches.   Psychiatric/Behavioral: Negative for agitation and behavioral problems. The patient is not nervous/anxious.      Objective:     Vital Signs (Most Recent):  Temp: 98.5 °F (36.9 °C) (01/09/18 1000)  Pulse: 80 (01/09/18 1100)  Resp: 13 (01/09/18 1000)  BP: (!) 74/0 (01/09/18 1100)  SpO2: 99 % (01/09/18 1000) Vital Signs (24h Range):  Temp:  [97.7 °F (36.5 °C)-98.7 °F (37.1 °C)] 98.5 °F (36.9 °C)  Pulse:  [74-82] 80  Resp:  [13-20] 13  SpO2:  [97 %-100 %] 99 %  BP: ()/(0-80) 74/0     Weight: 64 kg (141 lb 1.5 oz)  Body mass index is 20.84 kg/m².    Estimated Creatinine Clearance: 72.1 mL/min (based on SCr of 0.9 mg/dL).    Physical Exam   Constitutional: He is oriented to person, place, and time. He appears well-developed and well-nourished. No  distress.   Cardiovascular: Normal rate and regular rhythm.    No murmur heard.  VAD hum   Pulmonary/Chest: Effort normal and breath sounds normal. No respiratory distress.   Abdominal: Soft. Bowel sounds are normal. He exhibits no distension.       Musculoskeletal: Normal range of motion. He exhibits no edema.   Neurological: He is alert and oriented to person, place, and time.   Skin: Skin is warm and dry.        Psychiatric: He has a normal mood and affect. His behavior is normal.       Significant Labs:   Blood Culture:   Recent Labs  Lab 01/05/18  1606 01/06/18  1310 01/06/18  1347 01/08/18  0904 01/08/18  0910   LABBLOO Gram stain clement bottle: Gram positive cocci in clusters resembling Staph   Results called to and read back by:Adalgisa Guadarrama RN 01/06/2018  11:22  Gram stain aer bottle: Gram positive cocci in clusters resembling Staph   Positive results previously called 01/06/2018  23:47  STAPHYLOCOCCUS EPIDERMIDIS Gram stain clement bottle: Gram positive cocci in clusters resembling Staph   Results called to and read back by:Adalgisa Guadarrama RN 01/07/2018  11:34  Gram stain aer bottle: Gram positive cocci in clusters resembling Staph   Results called to and read back by:Adalgisa Guadarrama RN 01/07/2018  14:12  STAPHYLOCOCCUS EPIDERMIDIS Gram stain clement bottle: Gram positive cocci in clusters resembling Staph   Results called to and read back by:Adalgisa Guadarrama RN 01/07/2018  11:35  Gram stain aer bottle: Gram positive cocci in clusters resembling Staph  Results called to and read back by:Adalgisa Guadarrama RN 01/07/2018  14:11  STAPHYLOCOCCUS EPIDERMIDIS No Growth to date  No Growth to date No Growth to date  No Growth to date     CBC:   Recent Labs  Lab 01/08/18  0600 01/09/18  0555   WBC 4.53 5.00   HGB 7.7* 7.9*   HCT 25.2* 25.7*    217     CMP:   Recent Labs  Lab 01/08/18  0559 01/08/18  0600 01/09/18  0555   *  --  133*   K 4.4  --  4.3     --  100   CO2 27  --  28   GLU 82  --  76    BUN 16  --  15   CREATININE 0.8  --  0.9   CALCIUM 9.0  --  9.1   PROT  --  6.4  --    ALBUMIN  --  2.9*  --    BILITOT  --  0.3  --    ALKPHOS  --  67  --    AST  --  22  --    ALT  --  16  --    ANIONGAP 6*  --  5*   EGFRNONAA >60.0  --  >60.0       Significant Imaging: I have reviewed all pertinent imaging results/findings within the past 24 hours.

## 2018-01-09 NOTE — CONSULTS
TRANSESOPHAGEAL ECHOCARDIOGRAPHY   PRE-PROCEDURE NOTE    01/09/2018    HPI:     Suman Hayden is a 67 y.o. with NICM s/p HM3 7/2017, HTN, DLD, recent ICD revision 11/20 with Dr. Winchester c/b pocket hematoma and recent GIB requiring transfusion and EGD(due to ileal (NSAID induced) ulcer)  who was admitted for INR of 1.4; spiked fever to 103 on 1/5; blood cultures obtained and 4/4 bottles with staph species; Repeat cultures 1/6 also positive 4/4. ID following and recommended GLENN as device is suspected source of bacteremia.     Dysphagia or odynophagia:  NO  Liver Disease, esophageal disease, or known varices:  NO  Upper GI Bleeding: NO  Snoring:  NO  Sleep Apnea:  YES  +1  Prior neck surgery or radiation:  NO  Able to move neck in all directions:  YES  +1  History of anesthetic difficulties:  NO  Family history of anesthetic difficulties:  NO  Last oral intake:  >8hr    Mallampati Class:  3  ASA Score:  3      Meds:     Scheduled Meds:   amLODIPine  5 mg Oral Daily    dronabinol  5 mg Oral TID AC    furosemide  40 mg Oral BID    lisinopril  5 mg Oral Daily    magnesium oxide  400 mg Oral BID    pantoprazole  40 mg Oral BID AC    polyethylene glycol  17 g Oral Daily    pravastatin  20 mg Oral QHS    sodium chloride 0.9%  3 mL Intravenous Q8H    vancomycin (VANCOCIN) IVPB  1,000 mg Intravenous Q12H     PRN Meds:acetaminophen  Continuous Infusions:   heparin (porcine) in D5W 17 Units/kg/hr (01/08/18 1207)       Physical Exam:     Vitals:  Temp:  [97.7 °F (36.5 °C)-98.7 °F (37.1 °C)]   Pulse:  [74-88]   Resp:  [16-20]   BP: (64-78)/(0)   SpO2:  [97 %-100 %]        Constitutional:  NAD, conversant  HEENT:   Sclera anicteric, Uvula midline, EOMI, OP clear  Neck:   No JVD, moves to all direction without any limitations  CV:   RRR, no murmurs / rubs / gallops, normal S1/S2  Pulm:   CTAB, no wheezes, rales, or ronchi  GI:   Abdomen soft, NTND, +BS  Extremities:  No LE edema, warm with palpable pulses  Neuro:    AAOX3, no focal motor deficits      Labs:     Recent Results (from the past 336 hour(s))   CBC auto differential    Collection Time: 01/08/18  6:00 AM   Result Value Ref Range    WBC 4.53 3.90 - 12.70 K/uL    Hemoglobin 7.7 (L) 14.0 - 18.0 g/dL    Hematocrit 25.2 (L) 40.0 - 54.0 %    Platelets 201 150 - 350 K/uL   CBC auto differential    Collection Time: 01/07/18  5:56 AM   Result Value Ref Range    WBC 5.99 3.90 - 12.70 K/uL    Hemoglobin 7.1 (L) 14.0 - 18.0 g/dL    Hematocrit 23.3 (L) 40.0 - 54.0 %    Platelets 181 150 - 350 K/uL   CBC auto differential    Collection Time: 01/06/18  4:12 AM   Result Value Ref Range    WBC 7.30 3.90 - 12.70 K/uL    Hemoglobin 6.6 (L) 14.0 - 18.0 g/dL    Hematocrit 22.0 (L) 40.0 - 54.0 %    Platelets 204 150 - 350 K/uL       Recent Results (from the past 336 hour(s))   Basic metabolic panel     Collection Time: 01/08/18  5:59 AM   Result Value Ref Range    Sodium 135 (L) 136 - 145 mmol/L    Potassium 4.4 3.5 - 5.1 mmol/L    Chloride 102 95 - 110 mmol/L    CO2 27 23 - 29 mmol/L    BUN, Bld 16 8 - 23 mg/dL    Creatinine 0.8 0.5 - 1.4 mg/dL    Calcium 9.0 8.7 - 10.5 mg/dL    Anion Gap 6 (L) 8 - 16 mmol/L   Basic metabolic panel     Collection Time: 01/07/18  5:56 AM   Result Value Ref Range    Sodium 135 (L) 136 - 145 mmol/L    Potassium 4.0 3.5 - 5.1 mmol/L    Chloride 102 95 - 110 mmol/L    CO2 28 23 - 29 mmol/L    BUN, Bld 20 8 - 23 mg/dL    Creatinine 1.0 0.5 - 1.4 mg/dL    Calcium 8.8 8.7 - 10.5 mg/dL    Anion Gap 5 (L) 8 - 16 mmol/L   Basic metabolic panel     Collection Time: 01/06/18  4:12 AM   Result Value Ref Range    Sodium 135 (L) 136 - 145 mmol/L    Potassium 4.3 3.5 - 5.1 mmol/L    Chloride 101 95 - 110 mmol/L    CO2 27 23 - 29 mmol/L    BUN, Bld 19 8 - 23 mg/dL    Creatinine 0.9 0.5 - 1.4 mg/dL    Calcium 8.1 (L) 8.7 - 10.5 mg/dL    Anion Gap 7 (L) 8 - 16 mmol/L       Estimated Creatinine Clearance: 81.1 mL/min (based on SCr of 0.8 mg/dL).    INR: 1/8 1.8  Anti-Xa:  0.51 1/8    Imaging:  TTE 11/21/17  CONCLUSIONS     1 - Heartmate III LVAD; speed 5200.     2 - Mild left ventricular enlargement.     3 - Severely depressed left ventricular systolic function (EF 10-15%).     4 - Eccentric hypertrophy.     5 - Impaired LV relaxation, normal LAP (grade 1 diastolic dysfunction).     6 - Biatrial enlargement.     7 - Right ventricular enlargement with mildly to moderately depressed systolic function.     8 - Trivial to mild mitral regurgitation.     9 - Trivial tricuspid regurgitation.     10 - Increased central venous pressure.     11 - Pulmonary hypertension. The estimated PA systolic pressure is 41 mmHg.     EKG:   Date: Unreadable due to baseline artifact    Assessment & Plan:     PLAN:  1. GLENN for evaluation of endocarditis with high risk features including intracardiac device.    -The risks, benefits & alternatives of the procedure were explained to the patient.    -The risks of transesophageal echo include but are not limited to:  Dental trauma, esophageal trauma/perforation, bleeding, laryngospasm/brochospasm, aspiration, sore throat/hoarseness, & dislodgement of the endotracheal tube/nasogastric tube (where applicable).    -The risks of moderate sedation include hypotension, respiratory depression, arrhythmias, bronchospasm, & death.    -Informed consent was obtained & the patient is agreeable to proceed with the procedure.    I will discuss with the attending physician. Attending addendum is to follow.    Signed:  Maurice Ramos DO  Cardiology Fellow    1/9/2018 6:58 AM    Attending Addendum:

## 2018-01-09 NOTE — SUBJECTIVE & OBJECTIVE
Interval History: underwent GLENN this morning; feels great today; states he is hungry. Blood cultures 1/8 NGTD. Afebrile.     Review of Systems   Constitutional: Negative for chills and fever.   Respiratory: Negative for cough and shortness of breath.    Cardiovascular: Negative for chest pain and leg swelling.   Gastrointestinal: Negative for abdominal pain, constipation, diarrhea, nausea and vomiting.   Genitourinary: Negative for dysuria, frequency and hematuria.   Musculoskeletal: Negative for back pain and myalgias.   Skin: Negative for rash and wound.   Neurological: Negative for dizziness, light-headedness and headaches.   Psychiatric/Behavioral: Negative for agitation and behavioral problems. The patient is not nervous/anxious.      Objective:     Vital Signs (Most Recent):  Temp: 98.5 °F (36.9 °C) (01/09/18 1000)  Pulse: 80 (01/09/18 1100)  Resp: 13 (01/09/18 1000)  BP: (!) 74/0 (01/09/18 1100)  SpO2: 99 % (01/09/18 1000) Vital Signs (24h Range):  Temp:  [97.7 °F (36.5 °C)-98.7 °F (37.1 °C)] 98.5 °F (36.9 °C)  Pulse:  [74-82] 80  Resp:  [13-20] 13  SpO2:  [97 %-100 %] 99 %  BP: ()/(0-80) 74/0     Weight: 64 kg (141 lb 1.5 oz)  Body mass index is 20.84 kg/m².    Estimated Creatinine Clearance: 72.1 mL/min (based on SCr of 0.9 mg/dL).    Physical Exam   Constitutional: He is oriented to person, place, and time. He appears well-developed and well-nourished. No distress.   Cardiovascular: Normal rate and regular rhythm.    No murmur heard.  VAD hum   Pulmonary/Chest: Effort normal and breath sounds normal. No respiratory distress.   Abdominal: Soft. Bowel sounds are normal. He exhibits no distension.       Musculoskeletal: Normal range of motion. He exhibits no edema.   Neurological: He is alert and oriented to person, place, and time.   Skin: Skin is warm and dry.        Psychiatric: He has a normal mood and affect. His behavior is normal.       Significant Labs:   Blood Culture:   Recent Labs  Lab  01/05/18  1606 01/06/18  1310 01/06/18  1347 01/08/18  0904 01/08/18  0910   LABBLOO Gram stain clement bottle: Gram positive cocci in clusters resembling Staph   Results called to and read back by:Adalgisa Guadarrama RN 01/06/2018  11:22  Gram stain aer bottle: Gram positive cocci in clusters resembling Staph   Positive results previously called 01/06/2018  23:47  STAPHYLOCOCCUS EPIDERMIDIS Gram stain clement bottle: Gram positive cocci in clusters resembling Staph   Results called to and read back by:Adalgsia Guadarrama RN 01/07/2018  11:34  Gram stain aer bottle: Gram positive cocci in clusters resembling Staph   Results called to and read back by:Adalgisa Guadarrama RN 01/07/2018  14:12  STAPHYLOCOCCUS EPIDERMIDIS Gram stain clement bottle: Gram positive cocci in clusters resembling Staph   Results called to and read back by:Adalgisa Guadarrama RN 01/07/2018  11:35  Gram stain aer bottle: Gram positive cocci in clusters resembling Staph  Results called to and read back by:Adalgisa Guadarrama RN 01/07/2018  14:11  STAPHYLOCOCCUS EPIDERMIDIS No Growth to date  No Growth to date No Growth to date  No Growth to date     CBC:   Recent Labs  Lab 01/08/18  0600 01/09/18  0555   WBC 4.53 5.00   HGB 7.7* 7.9*   HCT 25.2* 25.7*    217     CMP:   Recent Labs  Lab 01/08/18  0559 01/08/18  0600 01/09/18  0555   *  --  133*   K 4.4  --  4.3     --  100   CO2 27  --  28   GLU 82  --  76   BUN 16  --  15   CREATININE 0.8  --  0.9   CALCIUM 9.0  --  9.1   PROT  --  6.4  --    ALBUMIN  --  2.9*  --    BILITOT  --  0.3  --    ALKPHOS  --  67  --    AST  --  22  --    ALT  --  16  --    ANIONGAP 6*  --  5*   EGFRNONAA >60.0  --  >60.0       Significant Imaging: I have reviewed all pertinent imaging results/findings within the past 24 hours.

## 2018-01-09 NOTE — PROGRESS NOTES
Pt taken for GLENN. Pt stable. AAOx4. No complaints of pain or signs of distress. Left per stretcher with transport.

## 2018-01-09 NOTE — PROGRESS NOTES
Pt admitted to PACU from EP. VSS. No distress noted at this time. Pt denies pain. Heparin infusing upon arrival to PACU. Clarified order with [facility] NP, EUSEBIA Paz; heparin infusion stopped per NP orders; see MAR. Spoke with Arun in pacemaker clinic regarding pt's AICD; states no interventions needed post procedure. Will continue to monitor.

## 2018-01-09 NOTE — ASSESSMENT & PLAN NOTE
67 year old male with NICM s/p LVAD 7/2017, HTN, DLD, recent ICD revision 11/20 with Dr. Winchester c/b pocket hematoma and recent GIB requiring transfusion and EGD who was admitted for INR of 1.4; spiked fever to 103 on 1/5; blood cultures obtained and 4/4 bottles with staph species; Repeat cultures 1/6 also positive 4/4.  DLES without signs of infection.  Pacer pocket with slight swelling but no erythema, drainage, fluctuance.  Patient afebrile, no leukocytosis. CRP wnl.     Plan:  1. Continue vancomycin 1 gram q 12 hours.  Vancomycin trough therapeutic at 16.9.   Repeat trough tomorrow afternoon to ensure no accumulation (ordered).   2. F/u repeat blood cultures.   3. Source of bacteremia likely from pacer; will likely need GLENN  4. Will follow    Data reviewed and plan discussed with ID staff

## 2018-01-09 NOTE — PROGRESS NOTES
[facility]  Infectious Disease  Progress Note    Patient Name: Suman Hayden  MRN: [id number]  Admission Date: 1/2/2018  Length of Stay: 6 days  Attending Physician: Brenden Veras MD  Primary Care Provider: Joe Ernst MD    Isolation Status: No active isolations  Assessment/Plan:      Bacteremia due to Staphylococcus       67 year old male with NICM s/p LVAD 7/2017, HTN, DLD, recent ICD revision 11/20 with Dr. Winchester c/b pocket hematoma and recent GIB requiring transfusion and EGD who was admitted for INR of 1.4; spiked fever to 103 on 1/5; blood cultures obtained and 4/4 bottles with staph species; Repeat cultures 1/6 also positive 4/4.  DLES without signs of infection.  Pacer pocket with slight swelling but no erythema, drainage, fluctuance.  Patient afebrile, no leukocytosis. CRP wnl.     Plan:  1. Continue vancomycin 1 gram q 12 hours.  Vancomycin trough therapeutic at 16.9.   Repeat trough tomorrow afternoon to ensure no accumulation (ordered).   2. F/u repeat blood cultures.   3. Source of bacteremia likely from pacer; will likely need GLENN  4. Will follow    Data reviewed and plan discussed with ID staff        AICD (automatic cardioverter/defibrillator) present    - will likely need GLENN to rule out lead vegetation            Thank you.   Please call for any questions or concerns.  YURI Butler, ANP-C  [phone]    Subjective:     Principal Problem:Subtherapeutic international normalized ratio (INR)    HPI: This is a 67 year old male with PMH of NICM s/p LVAD 7/2017, HTN, DLD, recent ICD revision 11/20 with Jacque-Jennifer c/b pocket hematoma and recent GIB requiring transfusion and EGD; discharged on 12/22 from that admission. Patient's INR was 1.4 and he was advised to come in for admission. Yesterday, patient spiked a fever of 102.9. Blood cultures were obtained and both sets with GPCs in chains resembling staph. ID consulted for recs.  Patient and wife deny LVAD DLES  drainage. His only complaint is a mild cough with some sputum production. He has slight pain over his pacer pocket. Denies recent absesses or boils.   Interval History: Afebrile.  WBC wnl.  Denies complaints.  Sitting up eating lunch.    Blood cultures 1/5 and 1/6 now showing Staph Epi.  Repeats 1/8 NGTD.        Review of Systems   Constitutional: Negative for chills and fever.   Respiratory: Negative for cough and shortness of breath.    Cardiovascular: Negative for chest pain and leg swelling.   Gastrointestinal: Negative for abdominal pain, constipation, diarrhea, nausea and vomiting.   Genitourinary: Negative for dysuria, frequency and hematuria.   Musculoskeletal: Negative for back pain and myalgias.   Skin: Negative for rash and wound.   Neurological: Negative for dizziness, light-headedness and headaches.   Psychiatric/Behavioral: Negative for agitation and behavioral problems. The patient is not nervous/anxious.      Objective:     Vital Signs (Most Recent):  Temp: 98 °F (36.7 °C) (01/08/18 1500)  Pulse: 80 (01/08/18 1500)  Resp: 17 (01/08/18 1500)  BP: (!) 64/0 (01/08/18 1500)  SpO2: 97 % (01/08/18 1500) Vital Signs (24h Range):  Temp:  [98 °F (36.7 °C)-98.5 °F (36.9 °C)] 98 °F (36.7 °C)  Pulse:  [78-88] 80  Resp:  [16-18] 17  SpO2:  [97 %-99 %] 97 %  BP: (64-93)/(0-65) 64/0     Weight: 64 kg (141 lb 1.5 oz)  Body mass index is 20.84 kg/m².    Estimated Creatinine Clearance: 81.1 mL/min (based on SCr of 0.8 mg/dL).    Physical Exam   Constitutional: He is oriented to person, place, and time. He appears well-developed and well-nourished. No distress.   Cardiovascular: Normal rate and regular rhythm.    No murmur heard.  VAD hum   Pulmonary/Chest: Effort normal and breath sounds normal. No respiratory distress.   Abdominal: Soft. Bowel sounds are normal. He exhibits no distension.       Musculoskeletal: Normal range of motion. He exhibits no edema.   Neurological: He is alert and oriented to person, place,  and time.   Skin: Skin is warm and dry.        Psychiatric: He has a normal mood and affect. His behavior is normal.       Significant Labs:   Blood Culture:     Recent Labs  Lab 01/05/18  1606 01/06/18  1310 01/06/18  1347 01/08/18  0904 01/08/18  0910   LABBLOO Gram stain clement bottle: Gram positive cocci in clusters resembling Staph   Results called to and read back by:Adalgisa Guadarrama RN 01/06/2018  11:22  Gram stain aer bottle: Gram positive cocci in clusters resembling Staph   Positive results previously called 01/06/2018  23:47  STAPHYLOCOCCUS EPIDERMIDIS Gram stain clement bottle: Gram positive cocci in clusters resembling Staph   Results called to and read back by:Adalgisa Guadarrama RN 01/07/2018  11:34  Gram stain aer bottle: Gram positive cocci in clusters resembling Staph   Results called to and read back by:Adalgisa Guadarrama RN 01/07/2018  14:12  STAPHYLOCOCCUS EPIDERMIDISSusceptibility pending Gram stain clement bottle: Gram positive cocci in clusters resembling Staph   Results called to and read back by:Adalgisa Guadarrama RN 01/07/2018  11:35  Gram stain aer bottle: Gram positive cocci in clusters resembling Staph  Results called to and read back by:Adalgisa Guadarrama RN 01/07/2018  14:11  STAPHYLOCOCCUS EPIDERMIDISSusceptibility pending No Growth to date No Growth to date     CBC:     Recent Labs  Lab 01/07/18  0556 01/08/18  0600   WBC 5.99 4.53   HGB 7.1* 7.7*   HCT 23.3* 25.2*    201     CMP:     Recent Labs  Lab 01/07/18  0556 01/08/18  0559 01/08/18  0600   * 135*  --    K 4.0 4.4  --     102  --    CO2 28 27  --    GLU 84 82  --    BUN 20 16  --    CREATININE 1.0 0.8  --    CALCIUM 8.8 9.0  --    PROT  --   --  6.4   ALBUMIN  --   --  2.9*   BILITOT  --   --  0.3   ALKPHOS  --   --  67   AST  --   --  22   ALT  --   --  16   ANIONGAP 5* 6*  --    EGFRNONAA >60.0 >60.0  --        Significant Imaging: I have reviewed all pertinent imaging results/findings within the past 24 hours.

## 2018-01-09 NOTE — ASSESSMENT & PLAN NOTE
67 year old male with NICM s/p LVAD 7/2017, HTN, DLD, recent ICD revision 11/20 with Dr. Winchester c/b pocket hematoma and recent GIB requiring transfusion and EGD who was admitted for INR of 1.4; spiked fever to 103 on 1/5; blood cultures obtained and 4/4 bottles positive with staph epi; Repeat cultures 1/6 also positive;   DLES without signs of infection.  Pacer pocket with slight swelling but no erythema, drainage, fluctuance.  Patient afebrile, no leukocytosis. CRP wnl.   - GLENN negative for pacer lead infection or endocarditis  - source likely from pacer pocket?    Plan:  1. Given trough of 23, will re-dose vancomycin to 1500 mg IV q 24 hours (repeat vanc trough before 4th dose)  2. F/u repeat blood cultures  3. Given LVAD and ICD in place, favor a longer course of antibiotic treatment with 6 weeks of IV vancomycin (estimated end date: 2/19/18)  4. Will need weekly CBC, CMP, ESR, CRP, and vanc trough faxed to ID clinic at [phone]  5. Okay to place PICC tomorrow if blood cultures from 1/8 remain no growth  6. ID will sign off but please re-consult if repeat blood cultures from 1/8 are positive

## 2018-01-09 NOTE — SUBJECTIVE & OBJECTIVE
Interval History: No complaints.     Continuous Infusions:   heparin (porcine) in D5W 17 Units/kg/hr (01/08/18 1207)     Scheduled Meds:   amLODIPine  5 mg Oral Daily    dronabinol  5 mg Oral TID AC    furosemide  40 mg Oral BID    lisinopril  5 mg Oral Daily    magnesium oxide  400 mg Oral BID    pantoprazole  40 mg Oral BID AC    polyethylene glycol  17 g Oral Daily    pravastatin  20 mg Oral QHS    sodium chloride 0.9%  3 mL Intravenous Q8H    vancomycin (VANCOCIN) IVPB  1,000 mg Intravenous Q12H     PRN Meds:    Review of patient's allergies indicates:  No Known Allergies  Objective:     Vital Signs (Most Recent):  Temp: 97.7 °F (36.5 °C) (01/09/18 0745)  Pulse: 78 (01/09/18 0745)  Resp: 16 (01/09/18 0745)  BP: (!) 68/0 (01/09/18 0745)  SpO2: 97 % (01/09/18 0745) Vital Signs (24h Range):  Temp:  [97.7 °F (36.5 °C)-98.7 °F (37.1 °C)] 97.7 °F (36.5 °C)  Pulse:  [74-82] 78  Resp:  [16-20] 16  SpO2:  [97 %-100 %] 97 %  BP: (64-70)/(0) 68/0     Patient Vitals for the past 72 hrs (Last 3 readings):   Weight   01/08/18 0700 64 kg (141 lb 1.5 oz)   01/07/18 0700 63.5 kg (140 lb)     Body mass index is 20.84 kg/m².      Intake/Output Summary (Last 24 hours) at 01/09/18 0814  Last data filed at 01/09/18 0600   Gross per 24 hour   Intake              980 ml   Output             2525 ml   Net            -1545 ml     Telemetry: nsr    Physical Exam   Constitutional: He is oriented to person, place, and time. He appears well-developed and well-nourished.   HENT:   Head: Normocephalic.   Eyes: Pupils are equal, round, and reactive to light.   Neck: Normal range of motion. Neck supple.   Cardiovascular: Normal rate and regular rhythm.    VAD hum.   Pulmonary/Chest: Effort normal and breath sounds normal.   Abdominal: Soft. Bowel sounds are normal.   Musculoskeletal: Normal range of motion.   Neurological: He is alert and oriented to person, place, and time.   Skin: Skin is warm and dry.   Psychiatric: He has a normal  mood and affect. His behavior is normal.   Nursing note and vitals reviewed.      Significant Labs:  CBC:    Recent Labs  Lab 01/07/18  0556 01/08/18 0600 01/09/18  0555   WBC 5.99 4.53 5.00   RBC 2.76* 2.98* 3.03*   HGB 7.1* 7.7* 7.9*   HCT 23.3* 25.2* 25.7*    201 217   MCV 84 85 85   MCH 25.7* 25.8* 26.1*   MCHC 30.5* 30.6* 30.7*     BNP:    Recent Labs  Lab 01/05/18  0510 01/08/18  0559   * 199*     CMP:    Recent Labs  Lab 01/02/18  2352  01/05/18  0510  01/07/18  0556 01/08/18  0559 01/08/18 0600 01/09/18  0555   GLU 93  < > 68*  < > 84 82  --  76   CALCIUM 9.3  < > 9.2  < > 8.8 9.0  --  9.1   ALBUMIN 2.9*  --  2.9*  --   --   --  2.9*  --    PROT 6.3  --  6.4  --   --   --  6.4  --      < > 138  < > 135* 135*  --  133*   K 3.4*  < > 4.0  < > 4.0 4.4  --  4.3   CO2 29  < > 27  < > 28 27  --  28     < > 101  < > 102 102  --  100   BUN 23  < > 16  < > 20 16  --  15   CREATININE 1.0  < > 0.9  < > 1.0 0.8  --  0.9   ALKPHOS 61  --  61  --   --   --  67  --    ALT 10  --  8*  --   --   --  16  --    AST 17  --  17  --   --   --  22  --    BILITOT 0.3  --  0.4  --   --   --  0.3  --    < > = values in this interval not displayed.   Coagulation:     Recent Labs  Lab 01/07/18  0556 01/08/18 0600 01/09/18  0555   INR 1.7* 1.8* 2.0*     LDH:    Recent Labs  Lab 01/07/18  0556 01/08/18  0600 01/09/18  0555    160 163     Microbiology:  Microbiology Results (last 7 days)     Procedure Component Value Units Date/Time    Blood culture [[id number]] Collected:  01/08/18 0904    Order Status:  Completed Specimen:  Blood Updated:  01/08/18 1545     Blood Culture, Routine No Growth to date    Blood culture [[id number]] Collected:  01/08/18 0910    Order Status:  Completed Specimen:  Blood Updated:  01/08/18 1545     Blood Culture, Routine No Growth to date    Blood culture [[id number]] Collected:  01/06/18 1310    Order Status:  Completed Specimen:  Blood Updated:  01/08/18 1350     Blood  Culture, Routine Gram stain clement bottle: Gram positive cocci in clusters resembling Staph      Blood Culture, Routine Results called to and read back by:Adalgisa Guadarrama RN 01/07/2018  11:34     Blood Culture, Routine Gram stain aer bottle: Gram positive cocci in clusters resembling Staph      Blood Culture, Routine Results called to and read back by:Adalgisa Guadarrama RN 01/07/2018  14:12     Blood Culture, Routine --     STAPHYLOCOCCUS EPIDERMIDIS  Susceptibility pending      Narrative:       From 2 different sites 30 minutes apart    Blood culture [[id number]] Collected:  01/06/18 1347    Order Status:  Completed Specimen:  Blood Updated:  01/08/18 1349     Blood Culture, Routine Gram stain clement bottle: Gram positive cocci in clusters resembling Staph      Blood Culture, Routine Results called to and read back by:Adalgisa Guadarrama RN 01/07/2018  11:35     Blood Culture, Routine Gram stain aer bottle: Gram positive cocci in clusters resembling Staph     Blood Culture, Routine Results called to and read back by:Adalgisa Guadarrama RN 01/07/2018  14:11     Blood Culture, Routine --     STAPHYLOCOCCUS EPIDERMIDIS  Susceptibility pending      Blood culture [[id number]]  (Susceptibility) Collected:  01/05/18 1606    Order Status:  Completed Specimen:  Blood Updated:  01/08/18 0949     Blood Culture, Routine Gram stain clement bottle: Gram positive cocci in clusters resembling Staph      Blood Culture, Routine Results called to and read back by:Adalgisa Guadarrama RN 01/06/2018  11:22     Blood Culture, Routine Gram stain aer bottle: Gram positive cocci in clusters resembling Staph      Blood Culture, Routine Positive results previously called 01/06/2018  23:47     Blood Culture, Routine STAPHYLOCOCCUS EPIDERMIDIS    Blood culture [[id number]]  (Susceptibility) Collected:  01/05/18 1605    Order Status:  Completed Specimen:  Blood Updated:  01/08/18 0948     Blood Culture, Routine Gram stain clement bottle: Gram positive cocci in clusters  resembling Staph      Blood Culture, Routine Results called to and read back by:Adalgisa Guadarrama RN 01/06/2018  11:21     Blood Culture, Routine Gram stain aer bottle: Gram positive cocci in clusters resembling Staph      Blood Culture, Routine Positive results previously called 01/06/2018  20:22     Blood Culture, Routine STAPHYLOCOCCUS EPIDERMIDIS        I have reviewed all pertinent labs within the past 24 hours.    Estimated Creatinine Clearance: 72.1 mL/min (based on SCr of 0.9 mg/dL).    Diagnostic Results:  I have reviewed all pertinent imaging results/findings within the past 24 hours.

## 2018-01-10 NOTE — PLAN OF CARE
Problem: Patient Care Overview  Goal: Plan of Care Review  Outcome: Ongoing (interventions implemented as appropriate)  Heparin d/c'd today.  INR 2.0.  Possible Picc placement tomorrow.  Will cont to monitor.

## 2018-01-10 NOTE — PROGRESS NOTES
DISCHARGE    SW to pt's room for discharge today.  Pt presents as sitting up in bedside chair with wife Kia in room.  Pt and wife both present as aao x4, calm, cooperative, and asking and answering questions appropriately.  Pt and wife verbalize understanding and agreement with plan for d/c to home today with HH and IV abx.  Pt and wife requesting to resume with [place]  and to use [facility].  SW notified Iraida with [facility] ([id number]) of d/c today, who will inform [place].  SW notified Sharlene with [facility] (ph: [phone], f: [phone]) of referral and granted Epic access.  Pt and wife both report coping adequately at this time, and deny any needs or concerns to SW.  Wife will transport pt home today.  SW providing ongoing psychosocial and counseling support, education, resources, assistance, and discharge planning as indicated.  SW remains available.

## 2018-01-10 NOTE — CONSULTS
Double lumen PICC placed to RUGHT BRACHIAL vein.  26cm in length, 0cm exposed, and 37cm arm circumference.  Lot # [id number]

## 2018-01-10 NOTE — DISCHARGE SUMMARY
[facility]  Heart Transplant  Discharge Summary      Patient Name: Suman Hayden  MRN: [id number]  Admission Date: 1/2/2018  Hospital Length of Stay: 8 days  Discharge Date and Time: 01/10/2018 3:46 PM  Attending Physician: Brenden Veras MD   Discharging Provider: Kishore Paz NP  Primary Care Provider: Joe Ernst MD     HPI: 67 YOAAM with a PMHx of NICM (EF 10%) s/p HeartMate 3 Implanted 7/27/2017 as DT, HTN, DLD, recent ICD revision on 11/20/17 with Dr. Vidal (DC ICD placed with active RA/LV leads (RV lead capped during revision) that was complicated by pocket hematoma and recent GIB bleed due to ileal (NSAID induced) ulcer requiring PRBC transfusion and EGD for which he was recently discharged on 12/22/17. ASA stopped at time and PPI increased to BID at time of discharge on 12/29/2017.  Pt found his INR to be at 1.4 today, and was therefore advised to be admitted for heparin bridge. He denied any symptoms suggestive of heart failure exacerbation, symptoms of GI bleed.     Procedure(s) (LRB):  TRANSESOPHAGEAL ECHOCARDIOGRAM (GLENN) (N/A)     Hospital Course: He was admitted and started on IV heparin bridge and coumadin was boosted. He spiked a fever and was cultured which grew Staph Epi. ID was consulted. GLENN was performed which revealed no vegetations. Cultures eventually cleared and PICC line was placed for home Abx. Heparin was stopped once INR>2. He was discharged home in good condition to finish course of IV Vanc(tentative end date 2/19/18). Of note he is not on PO iron at home secondary to GI intolerance but he tolerated IV iron while admitted without any issues. He will f/u in clinic in 1-2 weeks with labs prior.     Consults         Status Ordering Provider     Inpatient consult to Infectious Diseases  Once     Provider:  (Not yet assigned)    Completed PRANAV ASH     Inpatient consult to PICC team (NIAS)  Once     Provider:  (Not yet assigned)    Completed  CALI CHAMBERS        Pending Diagnostic Studies:     Procedure Component Value Units Date/Time    VANCOMYCIN, TROUGH before 4th dose [[id number]] Collected:  01/09/18 1005    Order Status:  Sent Lab Status:  In process Updated:  01/09/18 1005    Specimen:  Blood from Blood         Final Active Diagnoses:    Diagnosis Date Noted POA    PRINCIPAL PROBLEM:  Subtherapeutic international normalized ratio (INR) [R79.1] 11/30/2017 Yes    LVAD (left ventricular assist device) present [Z95.811] 07/29/2017 Not Applicable    Staphylococcus epidermidis bacteremia [R78.81] 01/06/2018 Yes    Normocytic anemia [D64.9] 12/07/2017 Yes    AICD (automatic cardioverter/defibrillator) present [Z95.810] 08/06/2017 Yes    Atrial fibrillation [I48.91] 07/29/2017 Yes    Nonischemic cardiomyopathy [I42.8] 06/25/2017 Yes      Problems Resolved During this Admission:    Diagnosis Date Noted Date Resolved POA    Hyperlipidemia [E78.5] 07/05/2017 01/03/2018 Yes      Discharged Condition: good    Patient Instructions:   No discharge procedures on file.  Medications:  Reconciled Home Medications:   Current Discharge Medication List      START taking these medications    Details   lisinopril (PRINIVIL,ZESTRIL) 5 MG tablet Take 1 tablet (5 mg total) by mouth once daily.  Qty: 90 tablet, Refills: 3      VANCOMYCIN HCL IN DEXTROSE 5 % (VANCOMYCIN IN 5 % DEXTROSE) 1.5 gram/250 mL Soln Inject 250 mLs (1,500 mg total) into the vein once daily.  Refills: 1         CONTINUE these medications which have CHANGED    Details   amLODIPine (NORVASC) 5 MG tablet Take 1 tablet (5 mg total) by mouth once daily.  Qty: 30 tablet, Refills: 11    Associated Diagnoses: Essential hypertension      warfarin (COUMADIN) 5 MG tablet Take 1 tablet (5mg) by mouth daily Mondays through Saturdays. Take 1.5 tablets (7.5mg) by mouth on Sundays  Qty: 32 tablet, Refills: 5         CONTINUE these medications which have NOT CHANGED    Details   dronabinol (MARINOL) 5 MG  capsule Take 1 capsule (5 mg total) by mouth 3 (three) times daily before meals.  Qty: 90 capsule, Refills: 5    Associated Diagnoses: Heart replaced by heart assist device      furosemide (LASIX) 40 MG tablet Take 1 tablet (40 mg total) by mouth 2 (two) times daily.  Qty: 60 tablet, Refills: 11      magnesium oxide (MAG-OX) 400 mg tablet Take 1 tablet (400 mg total) by mouth 2 (two) times daily.  Qty: 60 tablet, Refills: 11      pantoprazole (PROTONIX) 40 MG tablet Take 1 tablet (40 mg total) by mouth 2 (two) times daily before meals.  Qty: 60 tablet, Refills: 11      polyethylene glycol (GLYCOLAX) 17 gram/dose powder Take 17 g by mouth daily as needed.  Qty: 1700 g, Refills: 3      pravastatin (PRAVACHOL) 20 MG tablet Take 1 tablet (20 mg total) by mouth every evening.  Qty: 30 tablet, Refills: 11             Kishore Paz NP  Heart Transplant  [facility]

## 2018-01-10 NOTE — ASSESSMENT & PLAN NOTE
- Interval History was obtained from HTS attending team member  during rounding today.  - Anticoagulation ongoing  -INR therapeutic  - D/c today     Total time spent was 35 minutes.  Of which more than 50 percent of the care dominated counseling and coordinating care with different team members. The VAD was interrogated and all parameters were WNL and no significant findings were found in the history. All these findings are documented in the note above.

## 2018-01-10 NOTE — PROGRESS NOTES
[facility]  Heart Transplant  Progress Note    Patient Name: Suman Hayden  MRN: [id number]  Admission Date: 1/2/2018  Hospital Length of Stay: 8 days  Attending Physician: Brenden Veras MD  Primary Care Provider: Joe Ernst MD  Principal Problem:Subtherapeutic international normalized ratio (INR)    Subjective:     Interval History: No complaints.       Scheduled Meds:   amLODIPine  5 mg Oral Daily    dronabinol  5 mg Oral TID AC    furosemide  40 mg Oral BID    lisinopril  5 mg Oral Daily    magnesium oxide  400 mg Oral BID    pantoprazole  40 mg Oral BID AC    polyethylene glycol  17 g Oral Daily    pravastatin  20 mg Oral QHS    sodium chloride 0.9%  3 mL Intravenous Q8H    vancomycin (VANCOCIN) IVPB  1,500 mg Intravenous Q24H     PRN Meds:    Review of patient's allergies indicates:  No Known Allergies  Objective:     Vital Signs (Most Recent):  Temp: 97.8 °F (36.6 °C) (01/10/18 0537)  Pulse: 84 (01/10/18 0600)  Resp: 18 (01/10/18 0537)  BP: (!) 87/54 (01/10/18 0537)  SpO2: 100 % (01/10/18 0537) Vital Signs (24h Range):  Temp:  [97.7 °F (36.5 °C)-99 °F (37.2 °C)] 97.8 °F (36.6 °C)  Pulse:  [75-84] 84  Resp:  [13-20] 18  SpO2:  [95 %-100 %] 100 %  BP: ()/(0-80) 87/54     Patient Vitals for the past 72 hrs (Last 3 readings):   Weight   01/08/18 0700 64 kg (141 lb 1.5 oz)     Body mass index is 20.84 kg/m².      Intake/Output Summary (Last 24 hours) at 01/10/18 0743  Last data filed at 01/10/18 0400   Gross per 24 hour   Intake             1440 ml   Output             1100 ml   Net              340 ml     Telemetry: nsr    Physical Exam   Constitutional: He is oriented to person, place, and time. He appears well-developed and well-nourished.   HENT:   Head: Normocephalic.   Eyes: Pupils are equal, round, and reactive to light.   Neck: Normal range of motion. Neck supple.   Cardiovascular: Normal rate and regular rhythm.    VAD hum.   Pulmonary/Chest: Effort normal  and breath sounds normal.   Abdominal: Soft. Bowel sounds are normal.   Musculoskeletal: Normal range of motion.   Neurological: He is alert and oriented to person, place, and time.   Skin: Skin is warm and dry.   Psychiatric: He has a normal mood and affect. His behavior is normal.   Nursing note and vitals reviewed.      Significant Labs:  CBC:    Recent Labs  Lab 01/08/18  0600 01/09/18  0555 01/10/18  0428   WBC 4.53 5.00 4.51   RBC 2.98* 3.03* 2.79*   HGB 7.7* 7.9* 7.3*   HCT 25.2* 25.7* 24.1*    217 186   MCV 85 85 86   MCH 25.8* 26.1* 26.2*   MCHC 30.6* 30.7* 30.3*     BNP:    Recent Labs  Lab 01/05/18  0510 01/08/18  0559 01/10/18  0428   * 199* 142*     CMP:    Recent Labs  Lab 01/05/18  0510  01/08/18  0559 01/08/18  0600 01/09/18  0555 01/10/18  0428   GLU 68*  < > 82  --  76 74   CALCIUM 9.2  < > 9.0  --  9.1 8.4*   ALBUMIN 2.9*  --   --  2.9*  --  2.7*   PROT 6.4  --   --  6.4  --  6.1     < > 135*  --  133* 137   K 4.0  < > 4.4  --  4.3 4.0   CO2 27  < > 27  --  28 29     < > 102  --  100 102   BUN 16  < > 16  --  15 17   CREATININE 0.9  < > 0.8  --  0.9 0.9   ALKPHOS 61  --   --  67  --  80   ALT 8*  --   --  16  --  12   AST 17  --   --  22  --  18   BILITOT 0.4  --   --  0.3  --  0.3   < > = values in this interval not displayed.   Coagulation:     Recent Labs  Lab 01/08/18  0600 01/09/18  0555 01/10/18  0428   INR 1.8* 2.0* 2.1*     LDH:    Recent Labs  Lab 01/08/18  0600 01/09/18  0555 01/10/18  0428    163 140     Microbiology:  Microbiology Results (last 7 days)     Procedure Component Value Units Date/Time    Blood culture [[id number]] Collected:  01/08/18 0904    Order Status:  Completed Specimen:  Blood Updated:  01/09/18 1012     Blood Culture, Routine No Growth to date     Blood Culture, Routine No Growth to date    Blood culture [[id number]] Collected:  01/08/18 0910    Order Status:  Completed Specimen:  Blood Updated:  01/09/18 1012     Blood Culture,  Routine No Growth to date     Blood Culture, Routine No Growth to date    Blood culture [[id number]]  (Susceptibility) Collected:  01/06/18 1310    Order Status:  Completed Specimen:  Blood Updated:  01/09/18 0938     Blood Culture, Routine Gram stain clement bottle: Gram positive cocci in clusters resembling Staph      Blood Culture, Routine Results called to and read back by:Adalgisa Guadarrama RN 01/07/2018  11:34     Blood Culture, Routine Gram stain aer bottle: Gram positive cocci in clusters resembling Staph      Blood Culture, Routine Results called to and read back by:Adalgisa Guadarrama RN 01/07/2018  14:12     Blood Culture, Routine STAPHYLOCOCCUS EPIDERMIDIS    Narrative:       From 2 different sites 30 minutes apart    Blood culture [[id number]]  (Susceptibility) Collected:  01/06/18 1347    Order Status:  Completed Specimen:  Blood Updated:  01/09/18 0938     Blood Culture, Routine Gram stain clement bottle: Gram positive cocci in clusters resembling Staph      Blood Culture, Routine Results called to and read back by:Adalgisa Guadarrama RN 01/07/2018  11:35     Blood Culture, Routine Gram stain aer bottle: Gram positive cocci in clusters resembling Staph     Blood Culture, Routine Results called to and read back by:Adalgisa Guadarrama RN 01/07/2018  14:11     Blood Culture, Routine STAPHYLOCOCCUS EPIDERMIDIS    Blood culture [[id number]]  (Susceptibility) Collected:  01/05/18 1606    Order Status:  Completed Specimen:  Blood Updated:  01/08/18 0949     Blood Culture, Routine Gram stain clement bottle: Gram positive cocci in clusters resembling Staph      Blood Culture, Routine Results called to and read back by:Adalgisa Guadarrama RN 01/06/2018  11:22     Blood Culture, Routine Gram stain aer bottle: Gram positive cocci in clusters resembling Staph      Blood Culture, Routine Positive results previously called 01/06/2018  23:47     Blood Culture, Routine STAPHYLOCOCCUS EPIDERMIDIS    Blood culture [[id number]]  (Susceptibility)  "Collected:  01/05/18 1605    Order Status:  Completed Specimen:  Blood Updated:  01/08/18 0948     Blood Culture, Routine Gram stain clement bottle: Gram positive cocci in clusters resembling Staph      Blood Culture, Routine Results called to and read back by:Adalgisa Guadarrama RN 01/06/2018  11:21     Blood Culture, Routine Gram stain aer bottle: Gram positive cocci in clusters resembling Staph      Blood Culture, Routine Positive results previously called 01/06/2018  20:22     Blood Culture, Routine STAPHYLOCOCCUS EPIDERMIDIS        I have reviewed all pertinent labs within the past 24 hours.    Estimated Creatinine Clearance: 72.1 mL/min (based on SCr of 0.9 mg/dL).    Diagnostic Results:  I have reviewed all pertinent imaging results/findings within the past 24 hours.    Assessment and Plan:     No notes on file    * Subtherapeutic international normalized ratio (INR)    - INR therapeutic. Stop UFH. Continue warfarin.        LVAD (left ventricular assist device) present    -HM3 implanted 7/2017 as DT. Speed 5200.  -ASA recently stopped due to NSAID induced ulcer and major GI bleed.  -INR goal of 2-3. INR therapeutic today. Continue warfarin.   - home dose of coumadin 3 T/Th/Sat, 2 all other days  -Intermittently pulsatile.  -MAP goal of 60-80.  -Continue Amlodipine 10mg. Transitioned from hydralazine to ACE for renal protection   -Lasix 40 mg BID.        Staphylococcus epidermidis bacteremia    - Appreciate ID recs. GLENN neg for any veg.  - recent ICD revision 11/20 with Jacque-Jennifer c/b pocket hematoma   - blood cultures obtained 1/6 and 4/4 bottles with staph species  - Re cultured 1/8/17. NGTD.   - DLES viewed and no signs of drainage or erythema  - Continue vancomycin; trough before 4th dose  - Plan for PICC line today and possible DC home.           Normocytic anemia    -Recent "NSAID induced ulcer" per EGD.   -H/H stable from DC.   -Continue BID PPI .  -Patient not tolerant of oral iron per report. Tolerated IV iron " well without symptoms.               Kishore Paz NP  Heart Transplant  [facility]

## 2018-01-10 NOTE — ASSESSMENT & PLAN NOTE
- Appreciate ID recs. GLENN neg for any veg.  - recent ICD revision 11/20 with Jacque-Jennifer c/b pocket hematoma   - blood cultures obtained 1/6 and 4/4 bottles with staph species  - Re cultured 1/8/17. NGTD.   - DLES viewed and no signs of drainage or erythema  - Continue vancomycin; trough before 4th dose  - Plan for PICC line today and possible DC home.

## 2018-01-10 NOTE — PROGRESS NOTES
Mr Hayden is being discharged today after admission for heparin bridge due to subtherapeutic INR of 1.4. PMH significant for NICM (EF 10%) s/p HM3 LVAD 7/27/2017, HTN, dyslipidemia, and recent admission for GIB 2/2 NSAID induced ulcer. He remains off of ASA and on PPI BID.    He is now s/p 2 therapeutic INRs and will be discharged home on increased dose of warfarin.     Hospital course also complicated by staphylococcus epidermidis bacteremia on 1/6. Currently being treated with vancomycin. TTE was negative for vegetation. He will be discharged on vancomycin IV 1,500mg daily (end date 2/19/18).    Medication changes on this admission:  1. Increase in warfarin dose. He has been instructed to take warfarin 5mg on Mon-Sat, and 7.5mg on Sundays with goal INR 2-3, patient requires a bridge  2. Discontinuation of hydralazine and transition to lisinopril 5mg daily for renal protection  3. Reduction of amlodipine from 10mg to 5mg daily   4. Discontinuation of oral potassium supplement as levels have been WNL during admission  5. Vancomycin IV 1,500mg daily and will follow up with ID outpatient.    Follow-up  Recommend following up in coumadin clinic on Friday (1/12)      Patient's blue card has been updated and medications have been reviewed with patient and caregiver.       Suman Hayden   Home Medication Instructions MYRANDA:[id number]    Printed on:01/10/18 1237   Medication Information                      amLODIPine (NORVASC) 5 MG tablet  Take 1 tablet (5 mg total) by mouth once daily.             dronabinol (MARINOL) 5 MG capsule  Take 1 capsule (5 mg total) by mouth 3 (three) times daily before meals.             furosemide (LASIX) 40 MG tablet  Take 1 tablet (40 mg total) by mouth 2 (two) times daily.             lisinopril (PRINIVIL,ZESTRIL) 5 MG tablet  Take 1 tablet (5 mg total) by mouth once daily.             magnesium oxide (MAG-OX) 400 mg tablet  Take 1 tablet (400 mg total) by mouth 2 (two) times daily.              pantoprazole (PROTONIX) 40 MG tablet  Take 1 tablet (40 mg total) by mouth 2 (two) times daily before meals.             polyethylene glycol (GLYCOLAX) 17 gram/dose powder  Take 17 g by mouth daily as needed.             pravastatin (PRAVACHOL) 20 MG tablet  Take 1 tablet (20 mg total) by mouth every evening.             VANCOMYCIN HCL IN DEXTROSE 5 % (VANCOMYCIN IN 5 % DEXTROSE) 1.5 gram/250 mL Soln  Inject 250 mLs (1,500 mg total) into the vein once daily.             warfarin (COUMADIN) 5 MG tablet  Take 1 tablet (5mg) by mouth daily Mondays through Saturdays. Take 1.5 tablets (7.5mg) by mouth on Sundays

## 2018-01-10 NOTE — SUBJECTIVE & OBJECTIVE
Interval History: No complaints.       Scheduled Meds:   amLODIPine  5 mg Oral Daily    dronabinol  5 mg Oral TID AC    furosemide  40 mg Oral BID    lisinopril  5 mg Oral Daily    magnesium oxide  400 mg Oral BID    pantoprazole  40 mg Oral BID AC    polyethylene glycol  17 g Oral Daily    pravastatin  20 mg Oral QHS    sodium chloride 0.9%  3 mL Intravenous Q8H    vancomycin (VANCOCIN) IVPB  1,500 mg Intravenous Q24H     PRN Meds:    Review of patient's allergies indicates:  No Known Allergies  Objective:     Vital Signs (Most Recent):  Temp: 97.8 °F (36.6 °C) (01/10/18 0537)  Pulse: 84 (01/10/18 0600)  Resp: 18 (01/10/18 0537)  BP: (!) 87/54 (01/10/18 0537)  SpO2: 100 % (01/10/18 0537) Vital Signs (24h Range):  Temp:  [97.7 °F (36.5 °C)-99 °F (37.2 °C)] 97.8 °F (36.6 °C)  Pulse:  [75-84] 84  Resp:  [13-20] 18  SpO2:  [95 %-100 %] 100 %  BP: ()/(0-80) 87/54     Patient Vitals for the past 72 hrs (Last 3 readings):   Weight   01/08/18 0700 64 kg (141 lb 1.5 oz)     Body mass index is 20.84 kg/m².      Intake/Output Summary (Last 24 hours) at 01/10/18 0743  Last data filed at 01/10/18 0400   Gross per 24 hour   Intake             1440 ml   Output             1100 ml   Net              340 ml     Telemetry: nsr    Physical Exam   Constitutional: He is oriented to person, place, and time. He appears well-developed and well-nourished.   HENT:   Head: Normocephalic.   Eyes: Pupils are equal, round, and reactive to light.   Neck: Normal range of motion. Neck supple.   Cardiovascular: Normal rate and regular rhythm.    VAD hum.   Pulmonary/Chest: Effort normal and breath sounds normal.   Abdominal: Soft. Bowel sounds are normal.   Musculoskeletal: Normal range of motion.   Neurological: He is alert and oriented to person, place, and time.   Skin: Skin is warm and dry.   Psychiatric: He has a normal mood and affect. His behavior is normal.   Nursing note and vitals reviewed.      Significant  Labs:  CBC:    Recent Labs  Lab 01/08/18  0600 01/09/18  0555 01/10/18  0428   WBC 4.53 5.00 4.51   RBC 2.98* 3.03* 2.79*   HGB 7.7* 7.9* 7.3*   HCT 25.2* 25.7* 24.1*    217 186   MCV 85 85 86   MCH 25.8* 26.1* 26.2*   MCHC 30.6* 30.7* 30.3*     BNP:    Recent Labs  Lab 01/05/18 0510 01/08/18 0559 01/10/18  0428   * 199* 142*     CMP:    Recent Labs  Lab 01/05/18  0510  01/08/18  0559 01/08/18  0600 01/09/18  0555 01/10/18  0428   GLU 68*  < > 82  --  76 74   CALCIUM 9.2  < > 9.0  --  9.1 8.4*   ALBUMIN 2.9*  --   --  2.9*  --  2.7*   PROT 6.4  --   --  6.4  --  6.1     < > 135*  --  133* 137   K 4.0  < > 4.4  --  4.3 4.0   CO2 27  < > 27  --  28 29     < > 102  --  100 102   BUN 16  < > 16  --  15 17   CREATININE 0.9  < > 0.8  --  0.9 0.9   ALKPHOS 61  --   --  67  --  80   ALT 8*  --   --  16  --  12   AST 17  --   --  22  --  18   BILITOT 0.4  --   --  0.3  --  0.3   < > = values in this interval not displayed.   Coagulation:     Recent Labs  Lab 01/08/18  0600 01/09/18  0555 01/10/18  0428   INR 1.8* 2.0* 2.1*     LDH:    Recent Labs  Lab 01/08/18  0600 01/09/18  0555 01/10/18  0428    163 140     Microbiology:  Microbiology Results (last 7 days)     Procedure Component Value Units Date/Time    Blood culture [[id number]] Collected:  01/08/18 0904    Order Status:  Completed Specimen:  Blood Updated:  01/09/18 1012     Blood Culture, Routine No Growth to date     Blood Culture, Routine No Growth to date    Blood culture [[id number]] Collected:  01/08/18 0910    Order Status:  Completed Specimen:  Blood Updated:  01/09/18 1012     Blood Culture, Routine No Growth to date     Blood Culture, Routine No Growth to date    Blood culture [[id number]]  (Susceptibility) Collected:  01/06/18 1310    Order Status:  Completed Specimen:  Blood Updated:  01/09/18 0938     Blood Culture, Routine Gram stain clement bottle: Gram positive cocci in clusters resembling Staph      Blood Culture, Routine  Results called to and read back by:Adalgisa Guadarrama RN 01/07/2018  11:34     Blood Culture, Routine Gram stain aer bottle: Gram positive cocci in clusters resembling Staph      Blood Culture, Routine Results called to and read back by:Adalgisa Guadarrama RN 01/07/2018  14:12     Blood Culture, Routine STAPHYLOCOCCUS EPIDERMIDIS    Narrative:       From 2 different sites 30 minutes apart    Blood culture [[id number]]  (Susceptibility) Collected:  01/06/18 1347    Order Status:  Completed Specimen:  Blood Updated:  01/09/18 0938     Blood Culture, Routine Gram stain clement bottle: Gram positive cocci in clusters resembling Staph      Blood Culture, Routine Results called to and read back by:Adalgisa Guadarrama RN 01/07/2018  11:35     Blood Culture, Routine Gram stain aer bottle: Gram positive cocci in clusters resembling Staph     Blood Culture, Routine Results called to and read back by:Adalgisa Guadarrama RN 01/07/2018  14:11     Blood Culture, Routine STAPHYLOCOCCUS EPIDERMIDIS    Blood culture [[id number]]  (Susceptibility) Collected:  01/05/18 1606    Order Status:  Completed Specimen:  Blood Updated:  01/08/18 0949     Blood Culture, Routine Gram stain clement bottle: Gram positive cocci in clusters resembling Staph      Blood Culture, Routine Results called to and read back by:Adalgisa Guadarrama RN 01/06/2018  11:22     Blood Culture, Routine Gram stain aer bottle: Gram positive cocci in clusters resembling Staph      Blood Culture, Routine Positive results previously called 01/06/2018  23:47     Blood Culture, Routine STAPHYLOCOCCUS EPIDERMIDIS    Blood culture [[id number]]  (Susceptibility) Collected:  01/05/18 1605    Order Status:  Completed Specimen:  Blood Updated:  01/08/18 0948     Blood Culture, Routine Gram stain clement bottle: Gram positive cocci in clusters resembling Staph      Blood Culture, Routine Results called to and read back by:Adalgisa Guadarrama RN 01/06/2018  11:21     Blood Culture, Routine Gram stain aer bottle: Gram  positive cocci in clusters resembling Staph      Blood Culture, Routine Positive results previously called 01/06/2018  20:22     Blood Culture, Routine STAPHYLOCOCCUS EPIDERMIDIS        I have reviewed all pertinent labs within the past 24 hours.    Estimated Creatinine Clearance: 72.1 mL/min (based on SCr of 0.9 mg/dL).    Diagnostic Results:  I have reviewed all pertinent imaging results/findings within the past 24 hours.

## 2018-01-10 NOTE — PROGRESS NOTES
[facility]  Cardiothoracic Surgery  Progress Note    Patient Name: Suman Hayden  MRN: [id number]  Admission Date: 1/2/2018  Hospital Length of Stay: 8 days  Code Status: Full Code   Attending Physician: No att. providers found   Referring Provider: Mohit Ambrosio MD  Principal Problem:Subtherapeutic international normalized ratio (INR)    Subjective:     Post-Op Info:  Procedure(s) (LRB):  TRANSESOPHAGEAL ECHOCARDIOGRAM (GLENN) (N/A)   1 Day Post-Op     Subjective:     Post-Op Info:  * No surgery found *          Reason for Visit:  Patient is seen in follow up management of low INR    Interval History:  No acute events overnight. D/c today     Medications:  Continuous Infusions:   heparin (porcine) in D5W 19 Units/kg/hr (01/03/18 2159)     Scheduled Meds:   amLODIPine  10 mg Oral Daily    dronabinol  5 mg Oral TID AC    ferric gluconate (FERRLECIT) IVPB  250 mg Intravenous BID    furosemide  40 mg Oral BID    lisinopril  5 mg Oral Daily    magnesium oxide  400 mg Oral BID    pantoprazole  40 mg Oral BID AC    polyethylene glycol  17 g Oral Daily    potassium chloride  60 mEq Oral Daily    pravastatin  20 mg Oral QHS    sodium chloride 0.9%  3 mL Intravenous Q8H    warfarin  6 mg Oral Once     PRN Meds:     Objective:     Vital Signs (Most Recent):  Temp: 98.6 °F (37 °C) (01/04/18 1115)  Pulse: 79 (01/04/18 1200)  Resp: 18 (01/04/18 1115)  BP: (!) 78/0 (01/04/18 1115)  SpO2: 98 % (01/04/18 1115) Vital Signs (24h Range):  Temp:  [69.2 °F (20.7 °C)-99.6 °F (37.6 °C)] 98.6 °F (37 °C)  Pulse:  [] 79  Resp:  [14-18] 18  SpO2:  [97 %-99 %] 98 %  BP: (60-98)/(0-64) 78/0       Intake/Output Summary (Last 24 hours) at 01/04/18 1244  Last data filed at 01/04/18 0756   Gross per 24 hour   Intake              650 ml   Output             2725 ml   Net            -2075 ml       Physical Exam   Constitutional: He is oriented to person, place, and time. He appears well-developed and  well-nourished.   Cardiovascular: Normal rate.    VAD sounds smooth    Pulmonary/Chest: Effort normal.   Abdominal: Soft.   Musculoskeletal: Normal range of motion.   Neurological: He is alert and oriented to person, place, and time.   Skin: Skin is warm and dry.   Psychiatric: He has a normal mood and affect.       Significant Labs:  BMP:   Recent Labs  Lab 01/04/18 0628   GLU 81      K 3.6      CO2 30*   BUN 17   CREATININE 0.9   CALCIUM 9.2   MG 2.0     CBC:   Recent Labs  Lab 01/04/18 0628   WBC 5.34   RBC 2.70*   HGB 6.9*   HCT 22.8*      MCV 84   MCH 25.6*   MCHC 30.3*     Coagulation:   Recent Labs  Lab 01/04/18 0628   INR 1.3*       Procedure: Device Interrogation Including analysis of device parameters  Current Settings: Ventricular Assist Device  Review of device function is stable  TXP LVAD INTERROGATIONS 1/4/2018 1/4/2018 1/4/2018 1/3/2018 1/3/2018 1/3/2018 1/3/2018   Type HeartMate3 - HeartMate3 HeartMate3 HeartMate3 HeartMate3 HeartMate3   Flow 4.1 4.2 4.2 4.1 4.1 4.1 4.2   Speed 5200 5200 5200 5200 5200 5200 5200   PI 3.5 3.7 3.6 4.0 3.7 3.9 3.6   Power ([place]) 3.6 3.6 3.6 3.6 3.5 3.6 3.5   LSL 4800 4800 4800 4800 4800 - -   Pulsatility No Pulse No Pulse No Pulse Pulse No Pulse - -     Assessment/Plan:     LVAD (left ventricular assist device) present    - Interval History was obtained from HTS attending team member  during rounding today.  - Anticoagulation ongoing  -INR therapeutic  - D/c today     Total time spent was 35 minutes.  Of which more than 50 percent of the care dominated counseling and coordinating care with different team members. The VAD was interrogated and all parameters were WNL and no significant findings were found in the history. All these findings are documented in the note above.            I agree with the above plan and have seen and examined the patient myself. I have reviewed pertinent labs and studies on this patient. Interrogation of Ventricular  assist device was performed with physician analysis of device parameters and review of device function. I have personally reviewed the interrogation findings and agree with findings

## 2018-01-10 NOTE — ASSESSMENT & PLAN NOTE
"-Recent "NSAID induced ulcer" per EGD.   -H/H stable from DC.   -Continue BID PPI .  -Patient not tolerant of oral iron per report. Tolerated IV iron well without symptoms.     "

## 2018-01-10 NOTE — NURSING
Pt discharged per MD order. AVS reviewed with Pt and wife.  VSS. PIV removed intact x 2.  Telemetry monitor removed and notifed monitor room. Pt and family verbalized understanding. Awaiting escort and will continue to monitor pt.

## 2018-01-10 NOTE — PROCEDURES
"Suman Hayden is a 67 y.o. male patient.    Temp: 98.6 °F (37 °C) (01/10/18 0749)  Pulse: 77 (01/10/18 0749)  Resp: 18 (01/10/18 0749)  BP: (!) 76/0 (01/10/18 0749)  SpO2: 99 % (01/10/18 0749)  Weight: 64.8 kg (142 lb 13.7 oz) (01/10/18 0800)  Height: 5' 9" (175.3 cm) (01/02/18 2208)    PICC  Date/Time: 1/10/2018 10:55 AM  Performed by: CHEYANNE SOARES  Consent Done: Yes  Time out: Immediately prior to procedure a time out was called to verify the correct patient, procedure, equipment, support staff and site/side marked as required  Indications: med administration and vascular access  Anesthesia: local infiltration  Local anesthetic: lidocaine 1% without epinephrine  Anesthetic Total (mL): 2  Preparation: skin prepped with ChloraPrep  Skin prep agent dried: skin prep agent completely dried prior to procedure  Sterile barriers: all five maximum sterile barriers used - cap, mask, sterile gown, sterile gloves, and large sterile sheet  Hand hygiene: hand hygiene performed prior to central venous catheter insertion  Location details: right brachial  Catheter type: double lumen  Catheter size: 5 Fr  Catheter Length: 26cm    Ultrasound guidance: yes  Vessel Caliber: medium and patent, compressibility normal  Needle advanced into vessel with real time Ultrasound guidance.  Guidewire confirmed in vessel.  Image recorded and saved.  Sterile sheath used.  Number of attempts: 1  Post-procedure: blood return through all ports, chlorhexidine patch and sterile dressing applied  Specimens: No  Implants: No  Assessment: placement verified by x-ray  Complications: none        Nan Guevara  1/10/2018  "

## 2018-01-11 NOTE — PROGRESS NOTES
Per note: Mr Hayden is being discharged today after admission for heparin bridge due to subtherapeutic INR of 1.4. PMH significant for NICM (EF 10%) s/p HM3 LVAD 7/27/2017, HTN, dyslipidemia, and recent admission for GIB 2/2 NSAID induced ulcer. He remains off of ASA and on PPI BID.     He is now s/p 2 therapeutic INRs and will be discharged home on increased dose of warfarin.      Hospital course also complicated by staphylococcus epidermidis bacteremia on 1/6. Currently being treated with vancomycin. TTE was negative for vegetation. He will be discharged on vancomycin IV 1,500mg daily (end date 2/19/18).     Medication changes on this admission:  1. Increase in warfarin dose. He has been instructed to take warfarin 5mg on Mon-Sat, and 7.5mg on Sundays with goal INR 2-3, patient requires a bridge  2. Discontinuation of hydralazine and transition to lisinopril 5mg daily for renal protection  3. Reduction of amlodipine from 10mg to 5mg daily   4. Discontinuation of oral potassium supplement as levels have been WNL during admission  5. Vancomycin IV 1,500mg daily and will follow up with ID outpatient.    Per Amelia, INR 1/11. Wife confirms dose.       Follow-up  Recommend following up in coumadin clinic on Friday (1/12)

## 2018-01-15 NOTE — PROGRESS NOTES
Wife was called and given lab result, she verified correct dose of coumadin, reports no changes, Wife was advised of coumadin instructions and redraw date, order faxed to [facility]

## 2018-01-18 NOTE — PROGRESS NOTES
[facility] called 01/18/18 to inform us that some of the ([facility] are still closed. HH said they will try to see if they can get to patient home if not they will contact us.

## 2018-01-19 NOTE — PROGRESS NOTES
Wife questioned and confirmed correct dose .  Reports switching from Hydralazine to lisinopril.  Appetite has increased due to taking dronabinol (MARINOL) 5 MG capsule.

## 2018-01-23 PROBLEM — K92.2 GI BLEED: Status: ACTIVE | Noted: 2018-01-01

## 2018-01-23 NOTE — TELEPHONE ENCOUNTER
Spoke with wife, right before they were going to lab patient had a black BM. Explained to go to local ER vs lab. Dr. Bautista notified.

## 2018-01-23 NOTE — PROGRESS NOTES
Called wife to give her lab result from 1/22, she reported that the Patient's in the ER right now at [facility] due to low hemoglobin, and states will be transferred to [facility] as soon as a bed becomes available.

## 2018-01-23 NOTE — TELEPHONE ENCOUNTER
Pt with no signs of dizziness/weakness/bleeding. Stools brown in color. Already dressed and up for the day. Spoke with wife. Will have them STAT repeat at [facility]. If still low, send to ER.     Dr. Bautista aware of plan.

## 2018-01-23 NOTE — ED PROVIDER NOTES
"SCRIBE #1 NOTE: I, Gagan Weir Yann, am scribing for, and in the presence of, Deborah Zuniga MD. I have scribed the entire note.      History      Chief Complaint   Patient presents with    GI Bleeding     low H&H at PCP today       Review of patient's allergies indicates:  No Known Allergies     HPI   HPI    1/23/2018, 1:21 PM   History obtained from the patient and wife      History of Present Illness: Suman Hayden is a 67 y.o. male patient with a PMHx of AICD, who presents to the Emergency Department for anemia (Hb of 4.0) which was discovered by home health nurse earlier today following blood workup. Pt states he started having dark green stools today. Sxs are constant and moderate in severity. There are no mitigating or exacerbating factors noted. Pt denies any fever, N/V/D, ABD pain, numbness, LOC, CP, hematuria, rectal bleeding, and all other sxs at this time. Pt is on 5 mg of coumadin. Pt takes protonix BID. Pt last PO intake was "boost shake" 2 hours PTA. Pt is currently getting vancomycin in through RUE PICC line until February 19th for bacterimia since being discharged from hospital on 1/10/18. Pt's last EGD was in October of 2017 resulting gastritis and duodenitis. No further complaints or concerns at this time.       Arrival mode: AASI    PCP: Joe Ernst MD       Past Medical History:  Past Medical History:   Diagnosis Date    Arthritis     Cardiomyopathy     CHF (congestive heart failure)     Coronary artery disease     Encounter for blood transfusion     Hyperlipidemia     Hypertension     Hypotension, iatrogenic     Obesity     S/P implantation of automatic cardioverter/defibrillator (AICD)     Ventricular tachycardia        Past Surgical History:  Past Surgical History:   Procedure Laterality Date    ABDOMINAL SURGERY      CARDIAC CATHETERIZATION      CARDIAC DEFIBRILLATOR PLACEMENT      LEFT VENTRICULAR ASSIST DEVICE  07/27/2017         Family History:  Family " History   Problem Relation Age of Onset    Heart disease Mother     Heart disease Father        Social History:  Social History     Social History Main Topics    Smoking status: Never Smoker    Smokeless tobacco: Never Used    Alcohol use No    Drug use: Unknown    Sexual activity: unknown       ROS   Review of Systems   Constitutional: Negative for chills, fatigue and fever.        (+) anemia   HENT: Negative for sore throat.    Respiratory: Negative for shortness of breath.    Cardiovascular: Negative for chest pain.   Gastrointestinal: Negative for abdominal pain, blood in stool, constipation, diarrhea, nausea and vomiting.        (+) Dark green stool     Genitourinary: Negative for dysuria.   Musculoskeletal: Negative for back pain.   Skin: Negative for rash.   Neurological: Negative for weakness.   Hematological: Does not bruise/bleed easily.     Physical Exam      Initial Vitals [01/23/18 1318]   BP Pulse Resp Temp SpO2   92/63 (!) 35 16 98.7 °F (37.1 °C) 98 %      MAP       72.67          Physical Exam  Nursing Notes and Vital Signs Reviewed.  Constitutional: Patient is in no acute distress. Well-developed and well-nourished.  Head: Atraumatic. Normocephalic.  Eyes: PERRL. EOM intact. Conjunctivae are pale. Pale tongue. No scleral icterus.  ENT: Mucous membranes are moist. Oropharynx is clear and symmetric.    Neck: Supple. Full ROM. No lymphadenopathy.  Cardiovascular: Regular rate. Regular rhythm. No murmurs, rubs, or gallops. Distal pulses are 2+ and symmetric. Palpable left radial pulse.  Pulmonary/Chest: No respiratory distress. Clear to auscultation bilaterally. No wheezing or rales.  Abdominal: Soft and non-distended.  There is no tenderness.  No rebound, guarding, or rigidity. Good bowel sounds.  Genitourinary: No CVA tenderness  Rectal:  No tenderness.  No masses.  No hemorrhoids.  Normal sphincter tone.  Stool color is melanotic.  Musculoskeletal: Moves all extremities. No obvious  "deformities. No edema. No calf tenderness.  Skin: Warm and dry. PICC line to RUE.  Neurological:  Alert, awake, and appropriate.  Normal speech.  No acute focal neurological deficits are appreciated.  Psychiatric: Normal affect. Good eye contact. Appropriate in content.    ED Course    Critical Care  Date/Time: 1/23/2018 2:56 PM  Performed by: MARIA T ANDERSON  Authorized by: MARIA T ANDERSON   Direct patient critical care time: 15 minutes  Additional history critical care time: 5 minutes  Ordering / reviewing critical care time: 10 minutes  Documentation critical care time: 10 minutes  Consulting other physicians critical care time: 5 minutes  Consult with family critical care time: 5 minutes  Total critical care time (exclusive of procedural time) : 50 minutes  Critical care time was exclusive of separately billable procedures and treating other patients and teaching time.  Critical care was necessary to treat or prevent imminent or life-threatening deterioration of the following conditions: GI bleed, blood transfusion.  Critical care was time spent personally by me on the following activities: blood draw for specimens, discussions with consultants, evaluation of patient's response to treatment, obtaining history from patient or surrogate, ordering and review of laboratory studies, pulse oximetry, review of old charts, vascular access procedures, re-evaluation of patient's condition, ordering and review of radiographic studies, ordering and performing treatments and interventions, examination of patient, interpretation of cardiac output measurements and development of treatment plan with patient or surrogate.        ED Vital Signs:  Vitals:    01/23/18 1318 01/23/18 1344 01/23/18 1346 01/23/18 1510   BP: 92/63  (!) 90/59 (!) 88/50   Pulse: (!) 35 95 86 88   Resp: 16  18 20   Temp: 98.7 °F (37.1 °C)   98.9 °F (37.2 °C)   TempSrc: Oral      SpO2: 98%  100% 100%   Weight: 64.4 kg (142 lb)      Height: 5' 9" " (1.753 m)       01/23/18 1525 01/23/18 1540 01/23/18 1617 01/23/18 1721   BP: (!) 81/56 (!) 84/52 (!) 91/54 (!) 89/60   Pulse: 88 89 82 79   Resp: 16 16 16 14   Temp: 98.7 °F (37.1 °C)   98.4 °F (36.9 °C)   TempSrc: Oral      SpO2: 100% 100% 100% 100%   Weight:       Height:        01/23/18 1728 01/23/18 1744 01/23/18 1759 01/23/18 1900   BP: (!) 89/60 (!) 81/52 (!) 98/54 (!) 85/54   Pulse: 80 79 80 81   Resp: 18 15 16 14   Temp: 98.4 °F (36.9 °C) 98.4 °F (36.9 °C)  98.7 °F (37.1 °C)   TempSrc:       SpO2:  100% 100% 100%   Weight:       Height:        01/23/18 1932 01/23/18 2004   BP: (!) 81/56 (!) 80/67   Pulse: 80 80   Resp: 17 16   Temp:     TempSrc:     SpO2: 100% 100%   Weight:     Height:         Abnormal Lab Results:  Labs Reviewed   CBC W/ AUTO DIFFERENTIAL - Abnormal; Notable for the following:        Result Value    RBC 1.51 (*)     Hemoglobin 4.1 (*)     Hematocrit 15.0 (*)     MCV 99 (*)     MCHC 27.3 (*)     RDW 21.9 (*)     Platelets 149 (*)     Gran # 8.3 (*)     Gran% 75.4 (*)     Lymph% 13.7 (*)     All other components within normal limits    Narrative:       Hct and Hgb critical result(s) called and verbal readback obtained   from Vidhya Woodward RN  , 01/23/2018 14:03   COMPREHENSIVE METABOLIC PANEL - Abnormal; Notable for the following:     Potassium 3.3 (*)     Glucose 119 (*)     BUN, Bld 39 (*)     Calcium 8.5 (*)     Total Protein 5.5 (*)     Albumin 2.8 (*)     All other components within normal limits   PROTIME-INR - Abnormal; Notable for the following:     Prothrombin Time 36.4 (*)     INR 3.7 (*)     All other components within normal limits   APTT - Abnormal; Notable for the following:     aPTT 35.2 (*)     All other components within normal limits   OCCULT BLOOD X 1, STOOL - Abnormal; Notable for the following:     Occult Blood Positive (*)     All other components within normal limits   B-TYPE NATRIURETIC PEPTIDE - Abnormal; Notable for the following:      (*)     All other  components within normal limits   TROPONIN I   TYPE & SCREEN   PREPARE RBC SOFT        All Lab Results:  Results for orders placed or performed during the hospital encounter of 01/23/18   CBC auto differential   Result Value Ref Range    WBC 10.97 3.90 - 12.70 K/uL    RBC 1.51 (L) 4.60 - 6.20 M/uL    Hemoglobin 4.1 (LL) 14.0 - 18.0 g/dL    Hematocrit 15.0 (LL) 40.0 - 54.0 %    MCV 99 (H) 82 - 98 fL    MCH 27.2 27.0 - 31.0 pg    MCHC 27.3 (L) 32.0 - 36.0 g/dL    RDW 21.9 (H) 11.5 - 14.5 %    Platelets 149 (L) 150 - 350 K/uL    MPV 12.7 9.2 - 12.9 fL    Gran # 8.3 (H) 1.8 - 7.7 K/uL    Lymph # 1.5 1.0 - 4.8 K/uL    Mono # 0.7 0.3 - 1.0 K/uL    Eos # 0.4 0.0 - 0.5 K/uL    Baso # 0.06 0.00 - 0.20 K/uL    Gran% 75.4 (H) 38.0 - 73.0 %    Lymph% 13.7 (L) 18.0 - 48.0 %    Mono% 6.5 4.0 - 15.0 %    Eosinophil% 3.9 0.0 - 8.0 %    Basophil% 0.5 0.0 - 1.9 %    Differential Method Automated    Comprehensive metabolic panel   Result Value Ref Range    Sodium 138 136 - 145 mmol/L    Potassium 3.3 (L) 3.5 - 5.1 mmol/L    Chloride 105 95 - 110 mmol/L    CO2 24 23 - 29 mmol/L    Glucose 119 (H) 70 - 110 mg/dL    BUN, Bld 39 (H) 8 - 23 mg/dL    Creatinine 0.9 0.5 - 1.4 mg/dL    Calcium 8.5 (L) 8.7 - 10.5 mg/dL    Total Protein 5.5 (L) 6.0 - 8.4 g/dL    Albumin 2.8 (L) 3.5 - 5.2 g/dL    Total Bilirubin 0.2 0.1 - 1.0 mg/dL    Alkaline Phosphatase 55 55 - 135 U/L    AST 18 10 - 40 U/L    ALT 12 10 - 44 U/L    Anion Gap 9 8 - 16 mmol/L    eGFR if African American >60 >60 mL/min/1.73 m^2    eGFR if non African American >60 >60 mL/min/1.73 m^2   Protime-INR   Result Value Ref Range    Prothrombin Time 36.4 (H) 9.0 - 12.5 sec    INR 3.7 (H) 0.8 - 1.2   APTT   Result Value Ref Range    aPTT 35.2 (H) 21.0 - 32.0 sec   Occult blood x 1, stool   Result Value Ref Range    Occult Blood Positive (A) Negative   Troponin I   Result Value Ref Range    Troponin I 0.010 0.000 - 0.026 ng/mL   Brain natriuretic peptide   Result Value Ref Range      (H) 0 - 99 pg/mL   Type & Screen   Result Value Ref Range    Group & Rh O POS     Indirect Maria Teresa NEG    Prepare RBC 4 Units; active gi bleed on coumadin   Result Value Ref Range    UNIT NUMBER [id number]     PRODUCT CODE I1622Q39     DISPENSE STATUS TRANSFUSED     CODING SYSTEM SBGF155     Unit Blood Type Code 5100     Unit Blood Type O POS     Unit Expiration [id number]     UNIT NUMBER [id number]     PRODUCT CODE S6331G78     DISPENSE STATUS TRANSFUSED     CODING SYSTEM YSZR520     Unit Blood Type Code 5100     Unit Blood Type O POS     Unit Expiration [id number]     UNIT NUMBER [id number]     PRODUCT CODE T3855O56     DISPENSE STATUS CROSSMATCHED     CODING SYSTEM WPHN676     Unit Blood Type Code 5100     Unit Blood Type O POS     Unit Expiration [id number]     UNIT NUMBER [id number]     PRODUCT CODE J2902B41     DISPENSE STATUS CROSSMATCHED     CODING SYSTEM ZHNE617     Unit Blood Type Code 5100     Unit Blood Type O POS     Unit Expiration [id number]          Imaging Results:  Imaging Results          X-Ray Chest AP Portable (Final result)  Result time 01/23/18 14:24:24    Final result by JANICE Taylor Sr., MD (01/23/18 14:24:24)                 Impression:        1. The lungs are clear.  2. The right PICC remains in place.   3. There are multiple surgical changes.      Electronically signed by: JANICE TAYLOR MD  Date:     01/23/18  Time:    14:24              Narrative:    One-view chest x-ray    Clinical History: Gastrointestinal hemorrhage, unspecified    Finding: Comparison was made to a prior examination performed on 1/10/2018. The right PICC remains in place. There are multiple surgical changes. The size of the heart is normal. The lungs are clear. There is no pneumothorax.  The costophrenic angles are sharp.                                      The Emergency Provider reviewed the vital signs and test results, which are outlined above.    ED Discussion     2:09 PM: Spoke to transfer  coordinator who will attempt to get in touch with Dr. Bautista and call back.    2:32 PM: Consult with Dr. Bautista (LVAD) at [facility] concerning pt. There are no LVAD services, which the patient requires, offered at [facility] at this time. Dr. Bautista expresses understanding and will accept transfer for GI bleed. Dr. Bautista states that there are no current beds for pt to be accepted for transfer at this time and predicts that pt will most likely be transferred sometime tomorrow. Dr. Bautista does not want pt's coumadin to be reversed at this time. Dr. Bautista states that pt can have 2 units of blood with each unit transfused over 2 hours. Pt will not need LASIX at this time unless pt shows indications of volume overload.  Accepting Facility: [facility]  Accepting Physician: Dr. Bautista    2:42 PM: Re-evaluated pt. Informed pt and family that there are no LVAD services available at this time. I have discussed test results, shared treatment plan, and the need for transfer with patient and family at bedside. All historical, clinical, radiographic, and laboratory findings were reviewed with the patient/family in detail. Patient will be transferred by [facility] with ACLS care required en route. Patient understands that there could be unforeseen motor vehicle accidents or loss of vital signs that could result in potential death or permanent disability. Pt and family express understanding at this time and agree with all information. All questions answered. Pt and family have no further questions or concerns at this time. Pt is ready for transfer.       ED Medication(s):  Medications   pantoprazole injection 40 mg (40 mg Intravenous Given 1/23/18 1358)       Discharge Medication List as of 1/23/2018  8:31 PM                Medical Decision Making    Medical Decision Making:   Clinical Tests:   Lab Tests: Reviewed and Ordered  Radiological Study: Reviewed and Ordered  Medical Tests:  Reviewed and Ordered           Scribe Attestation:   Scribe #1: I performed the above scribed service and the documentation accurately describes the services I performed. I attest to the accuracy of the note.    Attending:   Physician Attestation Statement for Scribe #1: I, Deborah Zuniga MD, personally performed the services described in this documentation, as scribed by Gagan Lerner, in my presence, and it is both accurate and complete.          Clinical Impression       ICD-10-CM ICD-9-CM   1. Gastrointestinal hemorrhage, unspecified gastrointestinal hemorrhage type K92.2 578.9   2. GI bleed K92.2 578.9   3. Chronic anticoagulation Z79.01 V58.61   4. Anemia, unspecified type D64.9 285.9   5. LVAD (left ventricular assist device) present Z95.811 V43.21       Disposition:   Disposition: Transferred  Condition: Serious         Deborah Zuniga MD  01/24/18 0654

## 2018-01-24 NOTE — PROGRESS NOTES
[facility]  Cardiothoracic Surgery  Progress Note    Patient Name: Suman Hayden  MRN: [id number]  Admission Date: 1/23/2018  Hospital Length of Stay: 1 days  Code Status: Full Code   Attending Physician: Graciela Bautista MD   Referring Provider: Referring, Unknown  Principal Problem:GI bleed    Subjective:     Post-Op Info:  Procedure(s) (LRB):  DEVICE ASSISTED ENTEROSCOPY-ANTEROGRADE (N/A)         Subjective:     Post-Op Info:  Procedure(s) (LRB):  DEVICE ASSISTED ENTEROSCOPY-ANTEROGRADE (N/A)              Medications:  Continuous Infusions:  Scheduled Meds:   dronabinol  5 mg Oral TID AC    furosemide  40 mg Oral BID    lisinopril  5 mg Oral Daily    magnesium oxide  400 mg Oral BID    pantoprazole  80 mg Intravenous BID    pravastatin  20 mg Oral QHS    vancomycin 750 mg in normal saline 250 mL IVPB (ready to mix system)  750 mg Intravenous Q12H     PRN Meds:sodium chloride, sodium chloride     Objective:     Vital Signs (Most Recent):  Temp: 98.7 °F (37.1 °C) (01/24/18 1050)  Pulse: 63 (01/24/18 1100)  Resp: 18 (01/24/18 1050)  BP: 105/67 (01/24/18 1050)  SpO2: 99 % (01/24/18 1050) Vital Signs (24h Range):  Temp:  [98.4 °F (36.9 °C)-99.4 °F (37.4 °C)] 98.7 °F (37.1 °C)  Pulse:  [35-95] 63  Resp:  [14-20] 18  SpO2:  [96 %-100 %] 99 %  BP: ()/(0-67) 105/67       Intake/Output Summary (Last 24 hours) at 01/24/18 1231  Last data filed at 01/24/18 0556   Gross per 24 hour   Intake             1023 ml   Output              725 ml   Net              298 ml       Physical Exam   Constitutional: He is oriented to person, place, and time. He appears well-developed and well-nourished.   HENT:   Head: Normocephalic and atraumatic.   Eyes: EOM are normal. Pupils are equal, round, and reactive to light.   Neck: Normal range of motion. Neck supple.   Cardiovascular: Normal rate.    Pulmonary/Chest: Effort normal and breath sounds normal.   Abdominal: Soft. Bowel sounds are normal.    Musculoskeletal: Normal range of motion.   Neurological: He is alert and oriented to person, place, and time.   Skin: Skin is warm and dry.   Nursing note and vitals reviewed.      Significant Labs:  BMP:   Recent Labs  Lab 01/24/18  0525   GLU 84      K 3.1*      CO2 27   BUN 34*   CREATININE 0.8   CALCIUM 8.2*   MG 1.9     CBC:   Recent Labs  Lab 01/24/18  0922   WBC 8.42   RBC 2.32*   HGB 6.6*   HCT 20.1*   *   MCV 87   MCH 28.4   MCHC 32.8     Coagulation:   Recent Labs  Lab 01/23/18  1344 01/24/18  0525   INR 3.7* 2.8*   APTT 35.2*  --          Procedure: Device Interrogation Including analysis of device parameters  Current Settings: Ventricular Assist Device  Review of device function is stable  TXP LVAD INTERROGATIONS 1/24/2018 1/24/2018 1/24/2018 1/24/2018 1/24/2018 1/24/2018 1/24/2018   Type HeartMate3 HeartMate3 (No Data) HeartMate3 HeartMate3 HeartMate3 HeartMate3   Flow 4.3 4.1 - 4.0 4.1 4.0 4.1   Speed 5200 5200 - 5200 5200 5200 5200   PI 3.6 3.6 - 4.0 4.1 4.0 4.0   Power ([place]) 3.6 3.5 - 3.5 3.5 3.5 3.5   LSL - - - - - - -   Pulsatility - - - - Intermittent pulse No Pulse No Pulse     Assessment/Plan:     LVAD (left ventricular assist device) present    Daily E and M and VAD Interrogation Note    Reason for Visit:  Patient is seen in follow up for management of:  [] HeartMate III     Interval History:  The [x] implant date was 7/27/2017.   Implantation of intracorporeal left ventricular assist device - HeartMate   III, under the CAP extension protocol.  Events overnight reviewed     Review of Systems:  Positive for GI bleeding  Denies dizziness or lightheadedness  All other systems reviewed and negative    Medications:  Current Facility-Administered Medications   Medication Dose Route Frequency Provider Last Rate Last Dose    0.9%  NaCl infusion (for blood administration)   Intravenous Q24H PRN Giovanni Hayden DO        0.9%  NaCl infusion (for blood administration)    Intravenous Q24H PRN MELISSA Wilson        dronabinol capsule 5 mg  5 mg Oral TID AC Giovanni NORA Catracho, DO        furosemide tablet 40 mg  40 mg Oral BID Giovannimichael Hayden, DO   40 mg at 01/24/18 0848    lisinopril tablet 5 mg  5 mg Oral Daily Giovannimichael Hayden, DO   5 mg at 01/24/18 0848    magnesium oxide tablet 400 mg  400 mg Oral BID Giovanni D. Brown, DO   400 mg at 01/24/18 0848    pantoprazole injection 80 mg  80 mg Intravenous BID Giovanni NORA Hayden, DO   80 mg at 01/24/18 0849    pravastatin tablet 20 mg  20 mg Oral QHS Giovannimichael Hayden, DO   20 mg at 01/23/18 2347    vancomycin 750 mg in normal saline 250 mL IVPB (ready to mix system)  750 mg Intravenous Q12H Giovanni D. Catracho, DO   750 mg at 01/24/18 0849       Physical Examination:  Vital Signs:   Vitals:    01/24/18 1100   BP:    Pulse: 63   Resp:    Temp:      Cardiovascular:  [x] Regular rate and rhythm [] Irregular []  None (MATT) []  Other  [x]  No edema []  Edema present  [x]  Clear to auscultation  VAD sounds smooth  Skin:  Incision is [x]  Clean, dry and intact.    Sternum:  [x]  Stable []  Unstable  Driveline(s):   [x]  Clean, dry and intact.       Labs:  CBC, BMP, LDH, INR reviewed      Procedure:  Device Interrogation including analysis of device parameters.  Current Settings   [x]  Ventricular Assist Device  []  Total Artificial Heart interrogated  Review of device function is [x]  Stable     TXP LVAD INTERROGATIONS 1/24/2018 1/24/2018 1/24/2018 1/24/2018 1/24/2018 1/24/2018 1/24/2018   Type HeartMate3 HeartMate3 (No Data) HeartMate3 HeartMate3 HeartMate3 HeartMate3   Flow 4.3 4.1 - 4.0 4.1 4.0 4.1   Speed 5200 5200 - 5200 5200 5200 5200   PI 3.6 3.6 - 4.0 4.1 4.0 4.0   Power ([place]) 3.6 3.5 - 3.5 3.5 3.5 3.5   LSL - - - - - - -   Pulsatility - - - - Intermittent pulse No Pulse No Pulse       Assessment:  [x]  Primary Cardiomyopathy [x]  Congestive Heart Failure   []  Atrial Fibrillation []  Ventricular Tachycardia   []  Aftercare cardiac device [x]   Long term (current) use of anticoagulants   []  Ventilator-associated pneumonia []  Pneumonia viral, unspecified   []  Pneumonia, bacterial, unspecified []  Pneumonia, organism unspecified   [x]  Hemorrhage of GI tract, unspecified    []  Nosebleed              Plan:  [x]  Interval history obtained from ICU attending team member during rounding today  [x]  VAD/MATT teaching performed with patient  [x]  Mobilization / Physical Therapy ongoing  [x]  Anticoagulation []  Ongoing [x]  Held  []  GI consulted      Total time spent was 36 minutes.  Of which more than 50 percent of the care dominated counseling and coordinating care with different team members. The VAD was interrogated and all parameters were WNL and no significant findings were found in the history. All these findings are documented in the note above.      Date of Service: 01/24/2018                 Patient seen and evaluated with relevant plan as above. Documented in the note above Interrogation of Ventricular assist device was performed with physician analysis of device parameters and review of device function. I have personally reviewed the interrogation findings and agree with findings as stated.

## 2018-01-24 NOTE — CONSULTS
"  [facility]  Adult Nutrition  Consult Note    SUMMARY     Recommendations    Recommendation/Intervention:   1. When medically able, advance diet to regular.  2. Upon diet advancement, add Boost Plus ONS.  3. If unable to advance diet & parenteral nutrition warranted, please re-consult RD.  4. RD following.    Goals: PO intake >50%  Nutrition Goal Status: new  Communication of RD Recs: reviewed with RN    Reason for Assessment    Reason for Assessment: other (see comments) (RD Nutrition assessment)  Diagnosis: other (see comments) (GI bleed)  Relevant Medical History: CHF, CAD, HTN, Obesity, hyperlipidemia, LVAD placement (7/2017)  Interdisciplinary Rounds: did not attend     General Information Comments: Pt reports increased appetite while NPO and states he is hungry and wants to eat. Pt reports a little weight gain during hospital stay. Pt states he follows a normal diet at home, where the whole family aids in cooking. Pt in good mood and states he feels like walking laps around the hospital. Pt reports no n/v during hospital stay. Pt also states he "just wants his Boost".      Nutrition Discharge Planning: adequate PO intake    Nutrition Prescription Ordered    Current Diet Order: NPO    Nutrition/Diet History    Patient Reported Diet/Restrictions/Preferences: general        Supplemental Drinks or Food Habits: Boost Plus (drinks at home)       Labs/Tests/Procedures/Meds    Diagnostic Test/Procedure Review: reviewed  Pertinent Labs Reviewed: reviewed  Pertinent Labs Comments: Coumadin 2.8, protein 4.6, Hgb 4.9  Pertinent Medications Reviewed: reviewed  Pertinent Medications Comments: Coumadin, pantoprazole    Physical Findings    Overall Physical Appearance: loss of muscle mass, loss of subcutaneous fat  Oral/Mouth Cavity: WDL  Skin: intact    Anthropometrics    Temp: 98.7 °F (37.1 °C)     Height: 5' 9" (175.3 cm)  Weight Method: Standard Scale  Weight: 72.2 kg (159 lb 2.8 oz)  Ideal Body " Weight (IBW), Male: 160 lb     % Ideal Body Weight, Male (lb): 99.48 lb     BMI (Calculated): 23.6     Usual Body Weight (UBW), k kg     % Usual Body Weight: 96.47  % Weight Change From Usual Weight: -3.73 %    Estimated/Assessed Needs    Weight Used For Calorie Calculations: 72.1 kg (159 lb)      Energy Calorie Requirements (kcal): 1858.25 (1.25 PAL)  Energy Need Method: [place]-St Jeor     RMR ([place]-St. Jeor Equation): 1486.6     Weight Used For Protein Calculations: 72.1 kg (159 lb)  Protein Requirements: 87 g/d (1.2 g/kg)     RDA Method (mL): 1 mL/kcal or per MD    Assessment and Plan    Nutrition Problem  Inadequate energy intake    Related to (etiology):   Decreased appetite    Signs and Symptoms (as evidenced by):   Wt loss PTA    Nutrition Diagnosis Status:   New    Monitor and Evaluation    Food and Nutrient Intake: energy intake, food and beverage intake  Food and Nutrient Adminstration: diet order  Knowledge/Beliefs/Attitudes: food and nutrition knowledge/skill     Anthropometric Measurements: body mass index, weight change, weight, height/length  Biochemical Data, Medical Tests and Procedures: electrolyte and renal panel, gastrointestinal profile, glucose/endocrine profile, inflammatory profile, lipid profile  Nutrition-Focused Physical Findings: overall appearance      Nutrition Follow-Up    RD Follow-up?: Yes     I certify that I directed the dietetic intern in service delivery and guided them using my skilled judgment. As the cosigning dietitian, I have reviewed the dietetic interns documentation and am responsible for the treatment, assessment, and plan.    Kay Almanzar, Dietetic Intern

## 2018-01-24 NOTE — HPI
68 y/o male with a PMHx of NICM (EF 10%) s/p HeartMate 3 (Implanted 7/27/2017 as DT), HTN, DLD, poor appetite, recent ICD revision on 11/20/17 with Dr. Winchester complicated by pocket hematoma, recent admission for bacteremia (grew staph epi with negative GLENN) for which he has been on IV vancomycin per ID. He presents today after having dark stools over the past 48 hours in the setting of supratherapeutic INR. Home health nurse did labs today that showed Hgb of 4.1. He presented to [facility] ER where he has received 2 units of PRBCs and IV pantoprazole bolus. On presentation his vital signs and review of systems were grossly normal despite his very low Hgb so after discussion with Dr. Bautista it was decided to not emergently reverse his INR and he was transferred here for continuation of care. No LVAD alarms noted recently. States he feels like he is in his normal state of health with no recent illnesses. Denies NSAID use (aleve/ASA/ibuprofen)    Of note he does have a history of recent GIB bleed due to ileal (NSAID induced) ulcer requiring APC (double balloon enteroscopy by Dr. Chatterjee) and PRBC transfusion (Mid December). ASA stopped at time and PPI increased to BID.

## 2018-01-24 NOTE — PROGRESS NOTES
01/24/18 0045       VAD 07/27/17 1312 Left ventricular assist device HeartMate 3   Placement Date/Time: 07/27/17 1312   Present Prior to Hospital Arrival?: No  Inserted by: MD  VAD Type: Left ventricular assist device  VAD Brand: HeartMate 3   Site Location Abdomen right   Site Assessment Clean;Dry;Intact   Driveline Exit Site 1   Dressing Status Clean;Dry;Intact   Dressing Intervention Dressing changed   Performed By RN   Dressing Change Schedule Daily   Dressing Change Due 01/25/18   Driveline [place] in use Singer pepe   Condition CDI   Date changed 01/24/18   Power Source External DC

## 2018-01-24 NOTE — PLAN OF CARE
Problem: Patient Care Overview  Goal: Plan of Care Review  Outcome: Ongoing (interventions implemented as appropriate)  Pt in with GIB. 3 units of PRBCs ordered, 2nd unit currently infusing. Repeat H/H to be collected once 3rd unit completed, MD aware. LVAD BP, DPs and numbers all WNL. No alarms/events noted in LVAD hx. LVAD dressing changed this shift, no complications noted. Pt free of falls, trauma and injury. Skin remains clean dry and intact with no breakdown.  Pt educated on importance of measuring accurate  I/O. Pt in no pain. Reviewed plan of care with Pt and wife, verbalized understanding.NADN. Will continue to monitor.

## 2018-01-24 NOTE — ASSESSMENT & PLAN NOTE
Transfusing second unit of PRBC, will give another unit and check CBC  Pantoprazole 80mg IV BID   Not on ASA at baseline (held since December)   Gastroenterology consulted. NPO after midnight for scope  Hold Coumadin. No heparin gtt

## 2018-01-24 NOTE — ANESTHESIA PREPROCEDURE EVALUATION
[facility]  Anesthesia Pre-Operative Evaluation         Patient Name: Suman Hayden  YOB: 1950  MRN: [id number]    SUBJECTIVE:     Pre-operative evaluation for Procedure(s) (LRB):  DEVICE ASSISTED ENTEROSCOPY-ANTEROGRADE (N/A)     01/24/2018    Suman Hayden is a 67 y.o. male w/ a significant PMHx of NICM (EF 10%) s/p LVAD in July 2017, HTN, HLD, s/p ICD revision on 11/20/17c/b by pocket hematoma. Patient was recently admitted with bacteremia. Patient now admitted after having supratherapeutic INR and Hb of 4.1 and dark stools s/p 2u PRBCs. Patient had GI bleed in December and found to have ileal ulcer requiring balloon enteroscopy and blood transfusions.    Patient now presents for the above procedure(s).    Hb 6.6; INR 2.8    LDA:        VAD 07/27/17 1312 Left ventricular assist device HeartMate 3 (Active)   Site Location Abdomen right 1/24/2018 10:23 AM   Site Assessment Clean;Dry;Intact 1/24/2018 12:45 AM   Driveline Exit Site 1 1/24/2018 12:45 AM   Dressing Status Clean;Dry;Intact 1/24/2018 10:23 AM   Dressing Intervention Dressing changed 1/24/2018 12:45 AM   Performed By RN 1/24/2018 10:23 AM   Dressing Change Schedule Daily 1/24/2018 10:23 AM   Dressing Change Due 01/25/18 1/24/2018 10:23 AM   Driveline [place] in use Singer pepe 1/24/2018 10:23 AM   Condition CDI 1/24/2018 12:45 AM   Date changed 01/24/18 1/24/2018 12:45 AM   Power Source External DC 1/24/2018 12:45 AM   Equipment inventory at  Admission 1/23/2018 11:00 PM   Pump type HeartMate3 1/23/2018 11:00 PM   Primary Controller [facility]- [id number] 1/23/2018 11:00 PM   Extra Controller [facility] [id number] 1/23/2018 11:00 PM   Battery 1 SR [id number] 1/23/2018 11:00 PM   Battery 2 SR [id number] 1/23/2018 11:00 PM   Battery 3 SR [id number] 1/23/2018 11:00 PM   Battery 4 SR [id number] 1/23/2018 11:00 PM   Battery Clips x4 1/23/2018 11:00 PM   Number of days: 181       Prev airway:   Placement Date: 12/14/17; Placement Time: 1313; Method  of Intubation: Direct laryngoscopy; Inserted by: CRNA; Airway Device: Endotracheal Tube; Mask Ventilation: Easy; Intubated: Postinduction; Blade: Francois #2; Airway Device Size: 7.5; Style: Cuffed; Cuff Inflation: Minimal occlusive pressure; Inflation Amount: 6; Placement Verified By: Capnometry, Auscultation; Grade: Grade I; Complicating Factors: Anterior larynx;     Drips: None documented.      Patient Active Problem List   Diagnosis    Nonischemic cardiomyopathy    Encounter for monitoring amiodarone therapy    Essential hypertension    V-tach    Elevated PSA    Hepatitis B core antibody positive since 2012    Chronic systolic congestive heart failure    Heart transplant candidate    Hyperbilirubinemia    Malfunction of implantable cardioverter-defibrillator (ICD) electrode    Syncope and collapse    Atrial tachycardia    Hyperglycemia    AICD (automatic cardioverter/defibrillator) present    LVAD (left ventricular assist device) present    Atrial fibrillation    Heart replaced by heart assist device    Bacteremia    Anticoagulation monitoring, INR range 2-3    Defibrillator discharge    ICD (implantable cardioverter-defibrillator) pocket hematoma    Subtherapeutic international normalized ratio (INR)    Constipation    Normocytic anemia    Anemia    Ileum ulcer    Staphylococcus epidermidis bacteremia    GI bleed       Review of patient's allergies indicates:  No Known Allergies    Current Inpatient Medications:   dronabinol  5 mg Oral TID AC    furosemide  40 mg Oral BID    lisinopril  5 mg Oral Daily    magnesium oxide  400 mg Oral BID    pantoprazole  80 mg Intravenous BID    pravastatin  20 mg Oral QHS    vancomycin 750 mg in normal saline 250 mL IVPB (ready to mix system)  750 mg Intravenous Q12H       No current facility-administered medications on file prior to encounter.      Current Outpatient Prescriptions on File Prior to Encounter   Medication Sig Dispense Refill     amLODIPine (NORVASC) 5 MG tablet Take 1 tablet (5 mg total) by mouth once daily. 30 tablet 11    dronabinol (MARINOL) 5 MG capsule Take 1 capsule (5 mg total) by mouth 3 (three) times daily before meals. 90 capsule 5    furosemide (LASIX) 40 MG tablet Take 1 tablet (40 mg total) by mouth 2 (two) times daily. 60 tablet 11    lisinopril (PRINIVIL,ZESTRIL) 5 MG tablet Take 1 tablet (5 mg total) by mouth once daily. 90 tablet 3    magnesium oxide (MAG-OX) 400 mg tablet Take 1 tablet (400 mg total) by mouth 2 (two) times daily. 60 tablet 11    pantoprazole (PROTONIX) 40 MG tablet Take 1 tablet (40 mg total) by mouth 2 (two) times daily before meals. 60 tablet 11    polyethylene glycol (GLYCOLAX) 17 gram/dose powder Take 17 g by mouth daily as needed. 1700 g 3    pravastatin (PRAVACHOL) 20 MG tablet Take 1 tablet (20 mg total) by mouth every evening. 30 tablet 11    VANCOMYCIN HCL IN DEXTROSE 5 % (VANCOMYCIN IN 5 % DEXTROSE) 1.5 gram/250 mL Soln Inject 250 mLs (1,500 mg total) into the vein once daily.  1    warfarin (COUMADIN) 5 MG tablet Take 1 tablet (5mg) by mouth daily Mondays through Saturdays. Take 1.5 tablets (7.5mg) by mouth on Sundays 32 tablet 5       Past Surgical History:   Procedure Laterality Date    ABDOMINAL SURGERY      CARDIAC CATHETERIZATION      CARDIAC DEFIBRILLATOR PLACEMENT      LEFT VENTRICULAR ASSIST DEVICE  07/27/2017       Social History     Social History    Marital status:      Spouse name: N/A    Number of children: N/A    Years of education: N/A     Occupational History    Not on file.     Social History Main Topics    Smoking status: Never Smoker    Smokeless tobacco: Never Used    Alcohol use No    Drug use: No    Sexual activity: Not on file     Other Topics Concern    Not on file     Social History Narrative    No narrative on file       OBJECTIVE:     Vital Signs Range (Last 24H):  Temp:  [36.9 °C (98.4 °F)-37.4 °C (99.4 °F)]   Pulse:  [35-95]   Resp:   [14-20]   BP: ()/(0-67)   SpO2:  [96 %-100 %]       CBC:   Recent Labs      01/23/18   2335  01/24/18   0922   WBC  9.40  8.42   RBC  1.75*  2.32*   HGB  4.9*  6.6*   HCT  15.2*  20.1*   PLT  115*  109*   MCV  87  87   MCH  28.0  28.4   MCHC  32.2  32.8       CMP:   Recent Labs      01/23/18   1344  01/24/18   0525   NA  138  142   K  3.3*  3.1*   CL  105  109   CO2  24  27   BUN  39*  34*   CREATININE  0.9  0.8   GLU  119*  84   MG   --   1.9   CALCIUM  8.5*  8.2*   ALBUMIN  2.8*  2.2*   PROT  5.5*  4.6*   ALKPHOS  55  49*   ALT  12  8*   AST  18  13   BILITOT  0.2  0.5       INR:  Recent Labs     01/22/18 01/23/18   1344  01/24/18   0525   INR  2.9  3.7*  2.8*   APTT   --   35.2*   --        Diagnostic Studies: No relevant studies.    EKG: No recent studies available.    2D ECHO:  Results for orders placed or performed during the hospital encounter of 11/20/17   2D echo with color flow doppler   Result Value Ref Range    EF 13 (A) 55 - 65    Mitral Valve Regurgitation TRIVIAL TO MILD     Diastolic Dysfunction Yes (A)     Est. PA Systolic Pressure 41.42 (A)     Tricuspid Valve Regurgitation TRIVIAL          ASSESSMENT/PLAN:         Anesthesia Evaluation    I have reviewed the Patient Summary Reports.    I have reviewed the Nursing Notes.   I have reviewed the Medications.     Review of Systems  Anesthesia Hx:  No problems with previous Anesthesia  History of prior surgery of interest to airway management or planning: heart surgery. Previous anesthesia: General  Denies Personal Hx of Anesthesia complications.   Social:  Non-Smoker, No Alcohol Use    Hematology/Oncology:     Oncology Normal    -- Anemia:   EENT/Dental:EENT/Dental Normal   Cardiovascular:   Exercise tolerance: poor Pacemaker (AICD, last shock 11/28, hematoma over site) Hypertension CHF NYHA Classification IV S/p LVAD   Pulmonary:  Pulmonary Normal    Renal/:  Renal/ Normal     Hepatic/GI:  Hepatic/GI Normal PUD, GI bleed    Musculoskeletal:  Musculoskeletal Normal    Neurological:  Neurology Normal    Endocrine:  Endocrine Normal    Dermatological:  Skin Normal    Psych:  Psychiatric Normal           Physical Exam  General:  Well nourished    Airway/Jaw/Neck:  Airway Findings: Mouth Opening: Normal Tongue: Normal  General Airway Assessment: Adult  Mallampati: II  TM Distance: Normal, at least 6 cm  Jaw/Neck Findings:  Neck ROM: Normal ROM      Dental:  Dental Findings: Edentulous, Upper Dentures, Lower Dentures   Chest/Lungs:  Chest/Lungs Findings: Normal Respiratory Rate     Heart/Vascular:  Heart Findings: (LVAD hum)            Anesthesia Plan  Type of Anesthesia, risks & benefits discussed:  Anesthesia Type:  general, MAC  Patient's Preference: GA  Intra-op Monitoring Plan: standard ASA monitors  Intra-op Monitoring Plan Comments:   Post Op Pain Control Plan: per primary service following discharge from PACU  Post Op Pain Control Plan Comments:   Induction:   IV  Beta Blocker:  Patient is not currently on a Beta-Blocker (No further documentation required).       Informed Consent: Patient understands risks and agrees with Anesthesia plan.  Questions answered. Anesthesia consent signed with patient.  ASA Score: 3     Day of Surgery Review of History & Physical:  There are no significant changes.  H&P update referred to the provider.         Ready For Surgery From Anesthesia Perspective.

## 2018-01-24 NOTE — SUBJECTIVE & OBJECTIVE
Subjective:     Post-Op Info:  Procedure(s) (LRB):  DEVICE ASSISTED ENTEROSCOPY-ANTEROGRADE (N/A)              Medications:  Continuous Infusions:  Scheduled Meds:   dronabinol  5 mg Oral TID AC    furosemide  40 mg Oral BID    lisinopril  5 mg Oral Daily    magnesium oxide  400 mg Oral BID    pantoprazole  80 mg Intravenous BID    pravastatin  20 mg Oral QHS    vancomycin 750 mg in normal saline 250 mL IVPB (ready to mix system)  750 mg Intravenous Q12H     PRN Meds:sodium chloride, sodium chloride     Objective:     Vital Signs (Most Recent):  Temp: 98.7 °F (37.1 °C) (01/24/18 1050)  Pulse: 63 (01/24/18 1100)  Resp: 18 (01/24/18 1050)  BP: 105/67 (01/24/18 1050)  SpO2: 99 % (01/24/18 1050) Vital Signs (24h Range):  Temp:  [98.4 °F (36.9 °C)-99.4 °F (37.4 °C)] 98.7 °F (37.1 °C)  Pulse:  [35-95] 63  Resp:  [14-20] 18  SpO2:  [96 %-100 %] 99 %  BP: ()/(0-67) 105/67       Intake/Output Summary (Last 24 hours) at 01/24/18 1231  Last data filed at 01/24/18 0556   Gross per 24 hour   Intake             1023 ml   Output              725 ml   Net              298 ml       Physical Exam   Constitutional: He is oriented to person, place, and time. He appears well-developed and well-nourished.   HENT:   Head: Normocephalic and atraumatic.   Eyes: EOM are normal. Pupils are equal, round, and reactive to light.   Neck: Normal range of motion. Neck supple.   Cardiovascular: Normal rate.    Pulmonary/Chest: Effort normal and breath sounds normal.   Abdominal: Soft. Bowel sounds are normal.   Musculoskeletal: Normal range of motion.   Neurological: He is alert and oriented to person, place, and time.   Skin: Skin is warm and dry.   Nursing note and vitals reviewed.      Significant Labs:  BMP:   Recent Labs  Lab 01/24/18  0525   GLU 84      K 3.1*      CO2 27   BUN 34*   CREATININE 0.8   CALCIUM 8.2*   MG 1.9     CBC:   Recent Labs  Lab 01/24/18  0922   WBC 8.42   RBC 2.32*   HGB 6.6*   HCT 20.1*   PLT  109*   MCV 87   MCH 28.4   MCHC 32.8     Coagulation:   Recent Labs  Lab 01/23/18  1344 01/24/18  0525   INR 3.7* 2.8*   APTT 35.2*  --          Procedure: Device Interrogation Including analysis of device parameters  Current Settings: Ventricular Assist Device  Review of device function is stable  TXP LVAD INTERROGATIONS 1/24/2018 1/24/2018 1/24/2018 1/24/2018 1/24/2018 1/24/2018 1/24/2018   Type HeartMate3 HeartMate3 (No Data) HeartMate3 HeartMate3 HeartMate3 HeartMate3   Flow 4.3 4.1 - 4.0 4.1 4.0 4.1   Speed 5200 5200 - 5200 5200 5200 5200   PI 3.6 3.6 - 4.0 4.1 4.0 4.0   Power ([place]) 3.6 3.5 - 3.5 3.5 3.5 3.5   LSL - - - - - - -   Pulsatility - - - - Intermittent pulse No Pulse No Pulse

## 2018-01-24 NOTE — HPI
68 y/o male with a PMHx of NICM (EF 10%) s/p HeartMate 3 (Implanted 7/27/2017 as DT), HTN, DLD, poor appetite, recent ICD revision on 11/20/17 with Dr. Vidal complicated by pocket hematoma, recent admission for bacteremia (grew staph epi with negative GLENN) for which he has been on IV vancomycin per ID. He presents today after having dark stools over the past 48 hours in the setting of supratherapeutic INR. Home health nurse did labs today that showed Hgb of 4.1. He presented to [facility] ER where he has received 2 units of PRBCs and IV pantoprazole bolus. On presentation his vital signs and review of systems were grossly normal despite his very low Hgb so after discussion with Dr. Bautista it was decided to not emergently reverse his INR and he was transferred here for continuation of care. No LVAD alarms noted recently. States he feels like he is in his normal state of health with no recent illnesses. Denies NSAID use (aleve/ASA/ibuprofen)     Of note he does have a history of recent GIB bleed due to ileal (NSAID induced) ulcer requiring APC (double balloon enteroscopy by Dr. Chatterjee) and PRBC transfusion (Mid December). ASA stopped at time and PPI increased to BID.

## 2018-01-24 NOTE — ASSESSMENT & PLAN NOTE
HM3 implanted 7/2017 as DT  Not on ASA at baseline.   No flow alarms on interrogation of LVAD.    INR 3.7 today. Holding coumadin in setting of GI bleed   Intermittently pulsatile. MAP goal of 60-80  Continue Amlodipine 5mg daily and lisinopril 5mg daily  No signs of acute volume overload will continue lasix 40 mg BID (PTA med)

## 2018-01-24 NOTE — NURSING
Pt admitted to CSU room 303. Arrived to unit with EMS via stretcher. Transferred from stretcher to bed without any difficulties noted. Bed locked and in lowest position, side rails up x2, call bell within reach, verbalizes understanding to call for assistance. CSU tele initiated. Dr. Hayden notified of pts arrival. LAVD equipment inventoried and charted in EPIC. VSS. LVAD DP and #'s all WNL. Admit orders and POC reviewed with patent and wife, verbalizes understanding. NADN. Pt resting [id number] in bed voicing no complaints at this time. Wife at the bedside. Will continue to monitor.

## 2018-01-24 NOTE — TELEPHONE ENCOUNTER
Labs received from [facility].  vanc trough on 1/18 noted at 13.5.  Pt currently admitted inpatient for GI bleed; vanc being managed by hospital service.  Has f/u with me on 2/7.

## 2018-01-24 NOTE — H&P
[facility]  Heart Transplant  H&P    Patient Name: Suman Hayden  MRN: [id number]  Admission Date: 1/23/2018  Attending Physician: Graciela Bautista MD  Primary Care Provider: Joe Ernst MD  Principal Problem:GI bleed    Subjective:     History of Present Illness:  68 y/o male with a PMHx of NICM (EF 10%) s/p HeartMate 3 (Implanted 7/27/2017 as DT), HTN, DLD, poor appetite, recent ICD revision on 11/20/17 with Dr. Vidal complicated by pocket hematoma, recent admission for bacteremia (grew staph epi with negative GLENN) for which he has been on IV vancomycin per ID. He presents today after having dark stools over the past 48 hours in the setting of supratherapeutic INR. Home health nurse did labs today that showed Hgb of 4.1. He presented to [facility] where he has received 2 units of PRBCs and IV pantoprazole bolus. On presentation his vital signs and review of systems were grossly normal despite his very low Hgb so after discussion with Dr. Bautista it was decided to not emergently reverse his INR and he was transferred here for continuation of care. No LVAD alarms noted recently. States he feels like he is in his normal state of health with no recent illnesses. Denies NSAID use (aleve/ASA/ibuprofen)    Of note he does have a history of recent GIB bleed due to ileal (NSAID induced) ulcer requiring APC (double balloon enteroscopy by Dr. Chatterjee) and PRBC transfusion (Mid December). ASA stopped at time and PPI increased to BID.     Past Medical History:   Diagnosis Date    Cardiomyopathy     Hyperlipidemia     Hypertension     Obesity     S/P implantation of automatic cardioverter/defibrillator (AICD)     Ventricular tachycardia      Past Surgical History:   Procedure Laterality Date    CARDIAC CATHETERIZATION      CARDIAC DEFIBRILLATOR PLACEMENT      LEFT VENTRICULAR ASSIST DEVICE  07/27/2017     Review of patient's allergies indicates:  No Known Allergies    Current  Facility-Administered Medications   Medication    [START ON 1/24/2018] amLODIPine tablet 5 mg    [START ON 1/24/2018] dronabinol capsule 5 mg    [START ON 1/24/2018] furosemide tablet 40 mg    [START ON 1/24/2018] lisinopril tablet 5 mg    magnesium oxide tablet 400 mg    [START ON 1/24/2018] pantoprazole injection 80 mg    pravastatin tablet 20 mg    [START ON 1/24/2018] vancomycin 750 mg in normal saline 250 mL IVPB (ready to mix system)     Family History     Problem Relation (Age of Onset)    Heart disease Mother, Father        Social History Main Topics    Smoking status: Never Smoker    Smokeless tobacco: Never Used    Alcohol use No    Drug use: Unknown    Sexual activity: Not on file     Review of Systems   Constitutional: Positive for fatigue. Negative for chills and fever.   HENT: Negative for congestion, nosebleeds, sinus pain and sore throat.    Eyes: Negative.    Respiratory: Negative for chest tightness, shortness of breath and wheezing.    Cardiovascular: Negative for chest pain, palpitations and leg swelling.   Gastrointestinal: Positive for blood in stool and constipation. Negative for abdominal distention, abdominal pain, diarrhea, nausea, rectal pain and vomiting.        + Melena   Endocrine: Negative.    Genitourinary: Negative.    Musculoskeletal: Positive for arthralgias.   Skin: Negative.    Allergic/Immunologic: Negative.    Neurological: Positive for weakness. Negative for dizziness, seizures, syncope and headaches.   Hematological: Negative.    Psychiatric/Behavioral: Negative.      Objective:     Vital Signs (Most Recent):  Temp: 99.4 °F (37.4 °C) (01/23/18 2249)  Pulse: 60 (01/23/18 2249)  Resp: 18 (01/23/18 2249)  BP: (!) 82/56 (01/23/18 2249)  SpO2: 99 % (01/23/18 2249) Vital Signs (24h Range):  Temp:  [98.4 °F (36.9 °C)-99.4 °F (37.4 °C)] 99.4 °F (37.4 °C)  Pulse:  [35-95] 60  Resp:  [14-20] 18  SpO2:  [98 %-100 %] 99 %  BP: (80-98)/(50-67) 82/56     Patient Vitals for the  "past 72 hrs (Last 3 readings):   Weight   01/23/18 2252 72.7 kg (160 lb 6.2 oz)     Body mass index is 23.68 kg/m².    No intake or output data in the 24 hours ending 01/23/18 2301    Physical Exam   Constitutional: He is oriented to person, place, and time. He appears well-developed and well-nourished. No distress.   HENT:   Head: Normocephalic and atraumatic.   Eyes: Pupils are equal, round, and reactive to light. No scleral icterus.   Neck: Neck supple. No JVD present.   Cardiovascular:   + Smooth LVAD HUM    Pulmonary/Chest: Effort normal and breath sounds normal. He has no rales.   Abdominal: Soft. He exhibits no distension and no mass. There is no tenderness. There is no rebound.   Musculoskeletal: He exhibits no edema.   Neurological: He is alert and oriented to person, place, and time.   Skin: Skin is warm and dry. Capillary refill takes 2 to 3 seconds.   Psychiatric: He has a normal mood and affect. His behavior is normal. Judgment and thought content normal.   Vitals reviewed.    Significant Labs:  CBC:    Recent Labs  Lab 01/23/18  1344   WBC 10.97   RBC 1.51*   HGB 4.1*   HCT 15.0*   *   MCV 99*   MCH 27.2   MCHC 27.3*     BNP:    Recent Labs  Lab 01/23/18  1344   *     CMP:    Recent Labs  Lab 01/23/18  1344   *   CALCIUM 8.5*   ALBUMIN 2.8*   PROT 5.5*      K 3.3*   CO2 24      BUN 39*   CREATININE 0.9   ALKPHOS 55   ALT 12   AST 18   BILITOT 0.2      Coagulation:     Recent Labs  Lab 01/18/18 01/22/18 01/23/18  1344   INR 3.1 2.9 3.7*   APTT  --   --  35.2*     I have reviewed all pertinent labs within the past 24 hours.    Diagnostic Results:  CXR: X-ray Chest Ap Portable: "1. The lungs are clear. 2. The right PICC remains in place. 3. There are multiple surgical changes."    Assessment/Plan:     * GI bleed    Repeat CBC and Type and Screen   Will transfuse additional PRBC's if Hgb <7 or he shows signs of hemorraghic shock  Pantoprazole 80mg IV BID   Not on ASA at " baseline (held since December)   Gastroenterology consulted. Discussed case with on call fellow.   Hold Coumadin. Will not emergently reverse INR unless situation takes turn for the worse        LVAD (left ventricular assist device) present    HM3 implanted 7/2017 as DT  Not on ASA at baseline.   No flow alarms on interrogation of LVAD.    INR 3.7 today. Holding coumadin in setting of GI bleed   Intermittently pulsatile. MAP goal of 60-80  Continue Amlodipine 5mg daily and lisinopril 5mg daily  No signs of acute volume overload will continue lasix 40 mg BID (PTA med)         Bacteremia    Will continue outpatient regimen of Vancomycin   Will obtain trough in the AM             Giovanni serrano DO  Heart Transplant  [facility]

## 2018-01-24 NOTE — SUBJECTIVE & OBJECTIVE
Interval History: second unit of PRBC being transfused now, patient asymptomatic     Continuous Infusions:  Scheduled Meds:   dronabinol  5 mg Oral TID AC    furosemide  40 mg Oral BID    lisinopril  5 mg Oral Daily    magnesium oxide  400 mg Oral BID    pantoprazole  80 mg Intravenous BID    pravastatin  20 mg Oral QHS    vancomycin 750 mg in normal saline 250 mL IVPB (ready to mix system)  750 mg Intravenous Q12H     PRN Meds:sodium chloride    Review of patient's allergies indicates:  No Known Allergies  Objective:     Vital Signs (Most Recent):  Temp: 98.7 °F (37.1 °C) (01/24/18 1050)  Pulse: 63 (01/24/18 1100)  Resp: 18 (01/24/18 1050)  BP: 105/67 (01/24/18 1050)  SpO2: 99 % (01/24/18 1050) Vital Signs (24h Range):  Temp:  [98.4 °F (36.9 °C)-99.4 °F (37.4 °C)] 98.7 °F (37.1 °C)  Pulse:  [60-94] 63  Resp:  [14-20] 18  SpO2:  [96 %-100 %] 99 %  BP: ()/(0-67) 105/67     Patient Vitals for the past 72 hrs (Last 3 readings):   Weight   01/24/18 1031 72.2 kg (159 lb 2.8 oz)   01/24/18 0700 72.2 kg (159 lb 2.8 oz)   01/23/18 2252 72.7 kg (160 lb 6.2 oz)     Body mass index is 23.51 kg/m².      Intake/Output Summary (Last 24 hours) at 01/24/18 1458  Last data filed at 01/24/18 1300   Gross per 24 hour   Intake             1443 ml   Output             2176 ml   Net             -733 ml       Hemodynamic Parameters:           Physical Exam   Constitutional: He is oriented to person, place, and time. He appears well-developed and well-nourished. No distress.   HENT:   Head: Normocephalic and atraumatic.   Eyes: Pupils are equal, round, and reactive to light. No scleral icterus.   Neck: Neck supple. No JVD present.   Cardiovascular:   + Smooth LVAD HUM    Pulmonary/Chest: Effort normal and breath sounds normal. He has no rales.   Abdominal: Soft. He exhibits no distension and no mass. There is no tenderness. There is no rebound.   Musculoskeletal: He exhibits no edema.   Neurological: He is alert and oriented  to person, place, and time.   Skin: Skin is warm and dry. Capillary refill takes 2 to 3 seconds.   Psychiatric: He has a normal mood and affect. His behavior is normal. Judgment and thought content normal.   Vitals reviewed.      Significant Labs:  CBC:    Recent Labs  Lab 01/23/18  2335 01/24/18  0922 01/24/18  1416   WBC 9.40 8.42 8.39   RBC 1.75* 2.32* 2.77*   HGB 4.9* 6.6* 7.8*   HCT 15.2* 20.1* 23.5*   * 109* 114*   MCV 87 87 85   MCH 28.0 28.4 28.2   MCHC 32.2 32.8 33.2     BNP:    Recent Labs  Lab 01/23/18  1344 01/24/18  0525   * 617*     CMP:    Recent Labs  Lab 01/23/18  1344 01/24/18  0525   * 84   CALCIUM 8.5* 8.2*   ALBUMIN 2.8* 2.2*   PROT 5.5* 4.6*    142   K 3.3* 3.1*   CO2 24 27    109   BUN 39* 34*   CREATININE 0.9 0.8   ALKPHOS 55 49*   ALT 12 8*   AST 18 13   BILITOT 0.2 0.5      Coagulation:     Recent Labs  Lab 01/22/18 01/23/18  1344 01/24/18  0525   INR 2.9 3.7* 2.8*   APTT  --  35.2*  --      LDH:    Recent Labs  Lab 01/24/18  0525        Microbiology:  Microbiology Results (last 7 days)     ** No results found for the last 168 hours. **          I have reviewed all pertinent labs within the past 24 hours.    Estimated Creatinine Clearance: 89.6 mL/min (based on SCr of 0.8 mg/dL).    Diagnostic Results:  I have reviewed and interpreted all pertinent imaging results/findings within the past 24 hours.

## 2018-01-24 NOTE — SUBJECTIVE & OBJECTIVE
Past Medical History:   Diagnosis Date    Arthritis     Cardiomyopathy     CHF (congestive heart failure)     Coronary artery disease     Encounter for blood transfusion     Hyperlipidemia     Hypertension     Hypotension, iatrogenic     Obesity     S/P implantation of automatic cardioverter/defibrillator (AICD)     Ventricular tachycardia        Past Surgical History:   Procedure Laterality Date    ABDOMINAL SURGERY      CARDIAC CATHETERIZATION      CARDIAC DEFIBRILLATOR PLACEMENT      LEFT VENTRICULAR ASSIST DEVICE  07/27/2017       Review of patient's allergies indicates:  No Known Allergies  Family History     Problem Relation (Age of Onset)    Heart disease Mother, Father        Social History Main Topics    Smoking status: Never Smoker    Smokeless tobacco: Never Used    Alcohol use No    Drug use: No    Sexual activity: Not on file     Review of Systems   Constitutional: Negative for activity change, appetite change, chills, fatigue and fever.   HENT: Negative for trouble swallowing.    Respiratory: Negative.    Cardiovascular: Negative.    Gastrointestinal: Positive for blood in stool (on [facility] but not reported today).   Genitourinary: Negative.    Neurological: Negative for dizziness, tremors, syncope, weakness and headaches.     Objective:     Vital Signs (Most Recent):  Temp: 98.7 °F (37.1 °C) (01/24/18 1050)  Pulse: 63 (01/24/18 1100)  Resp: 18 (01/24/18 1050)  BP: 105/67 (01/24/18 1050)  SpO2: 99 % (01/24/18 1050) Vital Signs (24h Range):  Temp:  [98.4 °F (36.9 °C)-99.4 °F (37.4 °C)] 98.7 °F (37.1 °C)  Pulse:  [60-94] 63  Resp:  [14-20] 18  SpO2:  [96 %-100 %] 99 %  BP: ()/(0-67) 105/67     Weight: 72.2 kg (159 lb 2.8 oz) (01/24/18 1031)  Body mass index is 23.51 kg/m².      Intake/Output Summary (Last 24 hours) at 01/24/18 1445  Last data filed at 01/24/18 1300   Gross per 24 hour   Intake             1443 ml   Output             2176 ml   Net             -733 ml        Lines/Drains/Airways     Peripherally Inserted Central Catheter Line                 PICC Double Lumen 01/10/18 1055 right brachial 14 days          Line                 VAD 07/27/17 1312 Left ventricular assist device HeartMate 3 181 days                Physical Exam   Constitutional: He is oriented to person, place, and time. No distress.   HENT:   Head: Normocephalic and atraumatic.   Eyes: No scleral icterus.   Neck: Normal range of motion.   Cardiovascular: Normal rate and regular rhythm.    Pulmonary/Chest: Effort normal and breath sounds normal.   Abdominal: Soft. Bowel sounds are normal.   Neurological: He is alert and oriented to person, place, and time.   Skin: He is not diaphoretic.       Significant Labs:  CBC:   Recent Labs  Lab 01/23/18  2335 01/24/18  0922 01/24/18  1416   WBC 9.40 8.42 8.39   HGB 4.9* 6.6* 7.8*   HCT 15.2* 20.1* 23.5*   * 109* 114*     CMP:   Recent Labs  Lab 01/24/18  0525   GLU 84   CALCIUM 8.2*   ALBUMIN 2.2*   PROT 4.6*      K 3.1*   CO2 27      BUN 34*   CREATININE 0.8   ALKPHOS 49*   ALT 8*   AST 13   BILITOT 0.5     Coagulation:   Recent Labs  Lab 01/23/18  1344 01/24/18  0525   INR 3.7* 2.8*   APTT 35.2*  --        Significant Imaging:  Imaging results within the past 24 hours have been reviewed.

## 2018-01-24 NOTE — ASSESSMENT & PLAN NOTE
Repeat CBC and Type and Screen   Will transfuse additional PRBC's if Hgb <7 or he shows signs of hemorraghic shock  Pantoprazole 80mg IV BID   Not on ASA at baseline (held since December)   Gastroenterology consulted. Discussed case with on call fellow.   Hold Coumadin. Will not emergently reverse INR unless situation takes turn for the worse

## 2018-01-24 NOTE — SUBJECTIVE & OBJECTIVE
Past Medical History:   Diagnosis Date    Cardiomyopathy     Hyperlipidemia     Hypertension     Obesity     S/P implantation of automatic cardioverter/defibrillator (AICD)     Ventricular tachycardia      Past Surgical History:   Procedure Laterality Date    CARDIAC CATHETERIZATION      CARDIAC DEFIBRILLATOR PLACEMENT      LEFT VENTRICULAR ASSIST DEVICE  07/27/2017     Review of patient's allergies indicates:  No Known Allergies    Current Facility-Administered Medications   Medication    [START ON 1/24/2018] amLODIPine tablet 5 mg    [START ON 1/24/2018] dronabinol capsule 5 mg    [START ON 1/24/2018] furosemide tablet 40 mg    [START ON 1/24/2018] lisinopril tablet 5 mg    magnesium oxide tablet 400 mg    [START ON 1/24/2018] pantoprazole injection 80 mg    pravastatin tablet 20 mg    [START ON 1/24/2018] vancomycin 750 mg in normal saline 250 mL IVPB (ready to mix system)     Family History     Problem Relation (Age of Onset)    Heart disease Mother, Father        Social History Main Topics    Smoking status: Never Smoker    Smokeless tobacco: Never Used    Alcohol use No    Drug use: Unknown    Sexual activity: Not on file     Review of Systems   Constitutional: Positive for fatigue. Negative for chills and fever.   HENT: Negative for congestion, nosebleeds, sinus pain and sore throat.    Eyes: Negative.    Respiratory: Negative for chest tightness, shortness of breath and wheezing.    Cardiovascular: Negative for chest pain, palpitations and leg swelling.   Gastrointestinal: Positive for blood in stool and constipation. Negative for abdominal distention, abdominal pain, diarrhea, nausea, rectal pain and vomiting.        + Melena   Endocrine: Negative.    Genitourinary: Negative.    Musculoskeletal: Positive for arthralgias.   Skin: Negative.    Allergic/Immunologic: Negative.    Neurological: Positive for weakness. Negative for dizziness, seizures, syncope and headaches.   Hematological:  Negative.    Psychiatric/Behavioral: Negative.      Objective:     Vital Signs (Most Recent):  Temp: 99.4 °F (37.4 °C) (01/23/18 2249)  Pulse: 60 (01/23/18 2249)  Resp: 18 (01/23/18 2249)  BP: (!) 82/56 (01/23/18 2249)  SpO2: 99 % (01/23/18 2249) Vital Signs (24h Range):  Temp:  [98.4 °F (36.9 °C)-99.4 °F (37.4 °C)] 99.4 °F (37.4 °C)  Pulse:  [35-95] 60  Resp:  [14-20] 18  SpO2:  [98 %-100 %] 99 %  BP: (80-98)/(50-67) 82/56     Patient Vitals for the past 72 hrs (Last 3 readings):   Weight   01/23/18 2252 72.7 kg (160 lb 6.2 oz)     Body mass index is 23.68 kg/m².    No intake or output data in the 24 hours ending 01/23/18 2301    Physical Exam   Constitutional: He is oriented to person, place, and time. He appears well-developed and well-nourished. No distress.   HENT:   Head: Normocephalic and atraumatic.   Eyes: Pupils are equal, round, and reactive to light. No scleral icterus.   Neck: Neck supple. No JVD present.   Cardiovascular:   + Smooth LVAD HUM    Pulmonary/Chest: Effort normal and breath sounds normal. He has no rales.   Abdominal: Soft. He exhibits no distension and no mass. There is no tenderness. There is no rebound.   Musculoskeletal: He exhibits no edema.   Neurological: He is alert and oriented to person, place, and time.   Skin: Skin is warm and dry. Capillary refill takes 2 to 3 seconds.   Psychiatric: He has a normal mood and affect. His behavior is normal. Judgment and thought content normal.   Vitals reviewed.    Significant Labs:  CBC:    Recent Labs  Lab 01/23/18  1344   WBC 10.97   RBC 1.51*   HGB 4.1*   HCT 15.0*   *   MCV 99*   MCH 27.2   MCHC 27.3*     BNP:    Recent Labs  Lab 01/23/18  1344   *     CMP:    Recent Labs  Lab 01/23/18  1344   *   CALCIUM 8.5*   ALBUMIN 2.8*   PROT 5.5*      K 3.3*   CO2 24      BUN 39*   CREATININE 0.9   ALKPHOS 55   ALT 12   AST 18   BILITOT 0.2      Coagulation:     Recent Labs  Lab 01/18/18 01/22/18 01/23/18  1344   INR  "3.1 2.9 3.7*   APTT  --   --  35.2*     I have reviewed all pertinent labs within the past 24 hours.    Diagnostic Results:  CXR: X-ray Chest Ap Portable: "1. The lungs are clear. 2. The right PICC remains in place. 3. There are multiple surgical changes."  "

## 2018-01-24 NOTE — PROGRESS NOTES
[facility]  Heart Transplant  Progress Note    Patient Name: Suman Hayden  MRN: [id number]  Admission Date: 1/23/2018  Hospital Length of Stay: 1 days  Attending Physician: Graciela Bautista MD  Primary Care Provider: Joe Ernst MD  Principal Problem:GI bleed    Subjective:     Interval History: second unit of PRBC being transfused now, patient asymptomatic     Continuous Infusions:  Scheduled Meds:   dronabinol  5 mg Oral TID AC    furosemide  40 mg Oral BID    lisinopril  5 mg Oral Daily    magnesium oxide  400 mg Oral BID    pantoprazole  80 mg Intravenous BID    pravastatin  20 mg Oral QHS    vancomycin 750 mg in normal saline 250 mL IVPB (ready to mix system)  750 mg Intravenous Q12H     PRN Meds:sodium chloride    Review of patient's allergies indicates:  No Known Allergies  Objective:     Vital Signs (Most Recent):  Temp: 98.7 °F (37.1 °C) (01/24/18 1050)  Pulse: 63 (01/24/18 1100)  Resp: 18 (01/24/18 1050)  BP: 105/67 (01/24/18 1050)  SpO2: 99 % (01/24/18 1050) Vital Signs (24h Range):  Temp:  [98.4 °F (36.9 °C)-99.4 °F (37.4 °C)] 98.7 °F (37.1 °C)  Pulse:  [60-94] 63  Resp:  [14-20] 18  SpO2:  [96 %-100 %] 99 %  BP: ()/(0-67) 105/67     Patient Vitals for the past 72 hrs (Last 3 readings):   Weight   01/24/18 1031 72.2 kg (159 lb 2.8 oz)   01/24/18 0700 72.2 kg (159 lb 2.8 oz)   01/23/18 2252 72.7 kg (160 lb 6.2 oz)     Body mass index is 23.51 kg/m².      Intake/Output Summary (Last 24 hours) at 01/24/18 1458  Last data filed at 01/24/18 1300   Gross per 24 hour   Intake             1443 ml   Output             2176 ml   Net             -733 ml       Hemodynamic Parameters:           Physical Exam   Constitutional: He is oriented to person, place, and time. He appears well-developed and well-nourished. No distress.   HENT:   Head: Normocephalic and atraumatic.   Eyes: Pupils are equal, round, and reactive to light. No scleral icterus.   Neck: Neck supple. No JVD  present.   Cardiovascular:   + Smooth LVAD HUM    Pulmonary/Chest: Effort normal and breath sounds normal. He has no rales.   Abdominal: Soft. He exhibits no distension and no mass. There is no tenderness. There is no rebound.   Musculoskeletal: He exhibits no edema.   Neurological: He is alert and oriented to person, place, and time.   Skin: Skin is warm and dry. Capillary refill takes 2 to 3 seconds.   Psychiatric: He has a normal mood and affect. His behavior is normal. Judgment and thought content normal.   Vitals reviewed.      Significant Labs:  CBC:    Recent Labs  Lab 01/23/18  2335 01/24/18  0922 01/24/18  1416   WBC 9.40 8.42 8.39   RBC 1.75* 2.32* 2.77*   HGB 4.9* 6.6* 7.8*   HCT 15.2* 20.1* 23.5*   * 109* 114*   MCV 87 87 85   MCH 28.0 28.4 28.2   MCHC 32.2 32.8 33.2     BNP:    Recent Labs  Lab 01/23/18  1344 01/24/18  0525   * 617*     CMP:    Recent Labs  Lab 01/23/18  1344 01/24/18  0525   * 84   CALCIUM 8.5* 8.2*   ALBUMIN 2.8* 2.2*   PROT 5.5* 4.6*    142   K 3.3* 3.1*   CO2 24 27    109   BUN 39* 34*   CREATININE 0.9 0.8   ALKPHOS 55 49*   ALT 12 8*   AST 18 13   BILITOT 0.2 0.5      Coagulation:     Recent Labs  Lab 01/22/18 01/23/18  1344 01/24/18  0525   INR 2.9 3.7* 2.8*   APTT  --  35.2*  --      LDH:    Recent Labs  Lab 01/24/18  0525        Microbiology:  Microbiology Results (last 7 days)     ** No results found for the last 168 hours. **          I have reviewed all pertinent labs within the past 24 hours.    Estimated Creatinine Clearance: 89.6 mL/min (based on SCr of 0.8 mg/dL).    Diagnostic Results:  I have reviewed and interpreted all pertinent imaging results/findings within the past 24 hours.    Assessment and Plan:     68 y/o male with a PMHx of NICM (EF 10%) s/p HeartMate 3 (Implanted 7/27/2017 as DT), HTN, DLD, poor appetite, recent ICD revision on 11/20/17 with Dr. Vidal complicated by pocket hematoma, recent admission for bacteremia  (grew staph epi with negative GLENN) for which he has been on IV vancomycin per ID. He presents today after having dark stools over the past 48 hours in the setting of supratherapeutic INR. Home health nurse did labs today that showed Hgb of 4.1. He presented to [facility] ER where he has received 2 units of PRBCs and IV pantoprazole bolus. On presentation his vital signs and review of systems were grossly normal despite his very low Hgb so after discussion with Dr. Bautista it was decided to not emergently reverse his INR and he was transferred here for continuation of care. No LVAD alarms noted recently. States he feels like he is in his normal state of health with no recent illnesses. Denies NSAID use (aleve/ASA/ibuprofen)    Of note he does have a history of recent GIB bleed due to ileal (NSAID induced) ulcer requiring APC (double balloon enteroscopy by Dr. Chatterjee) and PRBC transfusion (Mid December). ASA stopped at time and PPI increased to BID.     * GI bleed    Transfusing second unit of PRBC, will give another unit and check CBC  Pantoprazole 80mg IV BID   Not on ASA at baseline (held since December)   Gastroenterology consulted. NPO after midnight for scope  Hold Coumadin. No heparin gtt        Bacteremia    Will continue outpatient regimen of Vancomycin   Will obtain trough in the AM         LVAD (left ventricular assist device) present    HM3 implanted 7/2017 as DT  Not on ASA at baseline.   No flow alarms on interrogation of LVAD.    INR theraputic Holding coumadin in setting of GI bleed   Intermittently pulsatile. MAP goal of 60-80  Continue lisinopril 5mg daily  No signs of acute volume overload will continue lasix 40 mg BID             MELISSA Long  Heart Transplant  [facility]

## 2018-01-24 NOTE — PROGRESS NOTES
Admit Note     Met with patient and wife to assess needs. Patient is a 67 y.o.  male, admitted for GI bleed [K92.2] per medical record. Pt has an LVAD, implanted in 7/2017.    Patient admitted from ED on 1/23/2018.  At this time, patient presents as alert and oriented x 4, calm and cooperative.  Pt resting in bed with eyes close and not participating in most of conversation today.  At this time, patients caregiver presents as alert and oriented x 4, communicative, cooperative and asking and answering questions appropriately.      Household/Family Systems     Patient currently resides with patient's daughter and son in law, at:    [address], [address]  [place] [address]    Pt's wife states she is staying with their dgtr as well.    Pt's permanent address is:    [address]  [place] [address]     Per wife their home is being worked on.    Home: [phone] (not currently working due to [employer] told family they need to rewire the home)  Pt cell:  [phone]  Kiaru Hayden (wife): [phone]  Rayna (dgtr): [phone]  Boni Hayden (son): [phone]  Artie Hayden (nephew, lives in [place]): [phone]    Support system includes spouse, adult children, extended family, many friends, and [employer] community.  Patient does not have dependents that are need of being cared for.     Patients primary caregiver is Kia Hayden, patients wife, and dgtr Rayna. During admission, patient's wife plans to stay in patient's room. Confirmed patient and patients caregivers do have access to reliable transportation.    Cognitive Status/Learning     Patient reports reading ability as 9th grade and states patient does not have difficulty with seeing, hearing, comprehension, learning and memory. Pt does report difficulty with reading and writing. Pt's wife assists with reading and writing.  Patient reports patient learns best by one-on-one.    Needed: No.   Highest education level: High School  (9-12) or GED    Vocation/Disability     Working for Income: No  If no, reason not working: Patient Choice - Retired    Patient is retired from the [employer] in .  Pt was a .  Pt's wife was working as a PCA until 2017, however she now cares for pt.     Adherence     Patient reports a high level of adherence to patients health care regimen.  Adherence counseling and education provided. Patient verbalizes understanding.    Substance Use    Patient reports the following substance usage.    Tobacco: none, patient denies any use.  Alcohol: none, patient denies any use.  Illicit Drugs/Non-prescribed Medications: none, patient denies any use.  Patient states clear understanding of the potential impact of substance use.  Substance abstinence/cessation counseling, education and resources provided and reviewed.     Services Utilizing/ADLS    Infusion Service: Prior to admission, patient utilizing? yes, [facility] for IV ABX  Home Health: Prior to admission, patient utilizing? yes, pt has [facility] (ph: [phone], f: [phone]) for skilled nrsg visits. Pt requesting to resume with Gina at d/c.  DME: Prior to admission, yes, [place] and w/c  Pulmonary/Cardiac Rehab: Prior to admission, no  Dialysis:  Prior to admission, no  Transplant Specialty Pharmacy:  Prior to admission, n/a    Prior to admission, patient reports patient was mostly independent with ADLS and was not driving. Pt's wife drives. Patient reports patient is able to care for self at this time.  Patient indicates a willingness to care for self once medically cleared to do so.    Insurance/Medications    Insured by   Payor/Plan Subscr  Sex Relation Sub. Ins. ID Effective Group Num   1. MEDICARE - ME* MIRTA LOPEZ 1950 Male  [id number] 6/1/15                                    [address]   2. BLUE CROSS BL* MIRTA LPOEZ 1950 Male  [id number] 1/1/10 [id number]                                    [address] [address]      Primary Insurance (for UNOS reporting): Public Insurance - Medicare FFS (Fee For Service)  Secondary Insurance (for UNOS reporting): Private Insurance    Patient reports patient is able to obtain and afford medications at this time and at time of discharge.    Living Will/Healthcare Power of     Patient states patient does not have a LW and/or HCPA.   provided education regarding LW and HCPA and the completion of forms.    Coping/Mental Health    Patient is coping well with the aid of  family members.  Patient denies mental health difficulties.     Discharge Planning    At time of discharge, patient plans to return to [facility]'s home under the care of wife and daughter.  Patients wife will transport patient.  Per rounds today, expected discharge date has not been medically determined at this time.  Patient and patients caregiver verbalize understanding and are involved in treatment planning and discharge process.    Additional Concerns    Patient and caregiver verbalize understanding and agreement with information reviewed,  availability, and how to access available resources as needed.  Patient and caregiver deny additional needs or concerns at this time. Patient is being followed for needs, education, resources, information, emotional support, supportive counseling, and for supportive and skilled discharge plan of care.  providing ongoing psychosocial support, education, resources and d/c planning as needed.  SW remains available.

## 2018-01-24 NOTE — PROGRESS NOTES
01/23/18 2356   Critical Value Communication   Notified Physician/Designee maggie   Date Result Received 01/23/17   Time Result Received 2356   Resulting Department of Critical Value Lab   Critical Test #1 H/H   Critical Test #1 Result 4.9/15.2   Date Notified 01/24/18   Time Notified 2358   Read Back Verification Yes   Physician Directive Will place order for blood transfusion

## 2018-01-24 NOTE — ASSESSMENT & PLAN NOTE
67 year old male with a PMHx of NICM (EF 10%) s/p HeartMate 3 (Implanted 7/27/2017 as DT), and recent ICD revision on 11/20/17 on who GI is being consulted for a GI bleed in the setting of a hemoglobin of 4.1 and melena on rectal.     In the setting of his low hemoglobin and melena on CAMDEN as well as mildly elevated BUN it is likely that patient is once again bleeding. Although compliant with medication maybe the supra-therapetic INR contributed to the bleeding. Therefore we will perform a DBE tomorrow which will allow us to examine the upper tract and reach the prior ulcer seen in December. Our concern is that is the current ulcer is bleeding our endoscopic interventions may be limited and we will have to consult surgery yet we first want to look before deciding. Further more it should be noted that his stricture cannot be transferred therefore we do not know if there are further lesions below that stricture.    Recommendations:  CLD today and NPO after MN for DBE in the morning-we will provide further recs post procedure  -Trend H/H and transfuse as needed  Obtain daily INR  Continue PPI IV BID

## 2018-01-24 NOTE — PLAN OF CARE
Problem: Patient Care Overview  Goal: Plan of Care Review  Outcome: Ongoing (interventions implemented as appropriate)  Pt verbalizes no complaints. Denies CP, SOB, or other discomforts. Pt receiving 3 units of blood. Pt will have GI procedure tomorrow to monitor for bleed.  Pt remains free of fall or injury. Pt verbalizes understanding of plan of care. Will continue to monitor.

## 2018-01-24 NOTE — CONSULTS
[facility]  Gastroenterology  Consult Note    Patient Name: Suman Hayden  MRN: [id number]  Admission Date: 1/23/2018  Hospital Length of Stay: 1 days  Code Status: Full Code   Attending Provider: Graciela Bautista MD   Consulting Provider: Maday Hood MD  Primary Care Physician: Joe Ernst MD  Principal Problem:GI bleed    Inpatient consult to Gastroenterology  Consult performed by: MADAY HOOD  Consult ordered by: SARA HAYDEN        Subjective:     HPI:  67 year old male with a PMHx of NICM (EF 10%) s/p HeartMate 3 (Implanted 7/27/2017 as DT), and recent ICD revision on 11/20/17 on who GI is being consulted for a GI bleed in the setting of a hemoglobin of 4.1 and melena on rectal.     Per patient after leaving the hospital he felt OK. He reported daily green bowel movements as well as compliance with PPI BID and not on NSAIDs (ASA was stopped but he was kept on Coumadin). However on routine labs he had a hemoglobin of 4.1 and INR of 3.7 and was asked to present to the ED. In the ED the provider documented melena on CAMDEN (witness by wife). Since he was HD stable INR was not rapidly reversed and he was transferred to [facility]. There has not been any further BM since being in the hospital. This morning after 2 units his hemoglobin is 6.6 and INR is 2.8. Furthermore he denies any chest pain, shortness of breath, nausea, emesis, or abdominal pain.     Prior GI procedure:  DBE 12/14/17  Normal esophagus.  Gastritis.  Multiple gastric polyps.  Mucosal changes in the duodenum.  The examined portion of the jejunum was normal.  A single (solitary) ulcer in the proximal ileum. Biopsied. Tattooed.  Stricture in the proximal ileum.  Presence of ileal foreign body. Removal was successful.    VCE 12/12/17  Multiple superficial ulcers throughout mid to distal small bowel.    EGD 12/8/17  Normal esophagus.  Gastritis and duodenitis. No gastrointestinal bleeding seen. Not biopsied due to  coagulopathy.    Colon 04/17:  6 mm polyp, small non-bleeding hemorrhoids, and diverticulosis    Past Medical History:   Diagnosis Date    Arthritis     Cardiomyopathy     CHF (congestive heart failure)     Coronary artery disease     Encounter for blood transfusion     Hyperlipidemia     Hypertension     Hypotension, iatrogenic     Obesity     S/P implantation of automatic cardioverter/defibrillator (AICD)     Ventricular tachycardia        Past Surgical History:   Procedure Laterality Date    ABDOMINAL SURGERY      CARDIAC CATHETERIZATION      CARDIAC DEFIBRILLATOR PLACEMENT      LEFT VENTRICULAR ASSIST DEVICE  07/27/2017       Review of patient's allergies indicates:  No Known Allergies  Family History     Problem Relation (Age of Onset)    Heart disease Mother, Father        Social History Main Topics    Smoking status: Never Smoker    Smokeless tobacco: Never Used    Alcohol use No    Drug use: No    Sexual activity: Not on file     Review of Systems   Constitutional: Negative for activity change, appetite change, chills, fatigue and fever.   HENT: Negative for trouble swallowing.    Respiratory: Negative.    Cardiovascular: Negative.    Gastrointestinal: Positive for blood in stool (on CAMDEN but not reported today).   Genitourinary: Negative.    Neurological: Negative for dizziness, tremors, syncope, weakness and headaches.     Objective:     Vital Signs (Most Recent):  Temp: 98.7 °F (37.1 °C) (01/24/18 1050)  Pulse: 63 (01/24/18 1100)  Resp: 18 (01/24/18 1050)  BP: 105/67 (01/24/18 1050)  SpO2: 99 % (01/24/18 1050) Vital Signs (24h Range):  Temp:  [98.4 °F (36.9 °C)-99.4 °F (37.4 °C)] 98.7 °F (37.1 °C)  Pulse:  [60-94] 63  Resp:  [14-20] 18  SpO2:  [96 %-100 %] 99 %  BP: ()/(0-67) 105/67     Weight: 72.2 kg (159 lb 2.8 oz) (01/24/18 1031)  Body mass index is 23.51 kg/m².      Intake/Output Summary (Last 24 hours) at 01/24/18 1445  Last data filed at 01/24/18 1300   Gross per 24 hour    Intake             1443 ml   Output             2176 ml   Net             -733 ml       Lines/Drains/Airways     Peripherally Inserted Central Catheter Line                 PICC Double Lumen 01/10/18 1055 right brachial 14 days          Line                 VAD 07/27/17 1312 Left ventricular assist device HeartMate 3 181 days                Physical Exam   Constitutional: He is oriented to person, place, and time. No distress.   HENT:   Head: Normocephalic and atraumatic.   Eyes: No scleral icterus.   Neck: Normal range of motion.   Cardiovascular: Normal rate and regular rhythm.    Pulmonary/Chest: Effort normal and breath sounds normal.   Abdominal: Soft. Bowel sounds are normal.   Neurological: He is alert and oriented to person, place, and time.   Skin: He is not diaphoretic.       Significant Labs:  CBC:   Recent Labs  Lab 01/23/18  2335 01/24/18  0922 01/24/18  1416   WBC 9.40 8.42 8.39   HGB 4.9* 6.6* 7.8*   HCT 15.2* 20.1* 23.5*   * 109* 114*     CMP:   Recent Labs  Lab 01/24/18  0525   GLU 84   CALCIUM 8.2*   ALBUMIN 2.2*   PROT 4.6*      K 3.1*   CO2 27      BUN 34*   CREATININE 0.8   ALKPHOS 49*   ALT 8*   AST 13   BILITOT 0.5     Coagulation:   Recent Labs  Lab 01/23/18  1344 01/24/18  0525   INR 3.7* 2.8*   APTT 35.2*  --        Significant Imaging:  Imaging results within the past 24 hours have been reviewed.    Assessment/Plan:     * GI bleed    67 year old male with a PMHx of NICM (EF 10%) s/p HeartMate 3 (Implanted 7/27/2017 as DT), and recent ICD revision on 11/20/17 on who GI is being consulted for a GI bleed in the setting of a hemoglobin of 4.1 and melena on rectal.     In the setting of his low hemoglobin and melena on CAMDEN as well as mildly elevated BUN it is likely that patient is once again bleeding. Although compliant with medication maybe the supra-therapetic INR contributed to the bleeding. Therefore we will perform a DBE tomorrow which will allow us to examine the  upper tract and reach the prior ulcer seen in December. Our concern is that is the current ulcer is bleeding our endoscopic interventions may be limited and we will have to consult surgery yet we first want to look before deciding. Further more it should be noted that his stricture cannot be transferred therefore we do not know if there are further lesions below that stricture.    Recommendations:  CLD today and NPO after MN for DBE in the morning-we will provide further recs post procedure  -Trend H/H and transfuse as needed  Obtain daily INR  Continue PPI IV BID            Thank you for your consult. I will follow-up with patient. Please contact us if you have any additional questions.      Dale Puga M.D.  Gastroenterology Fellow, PGY-IV  Pager: [phone]  [facility]

## 2018-01-24 NOTE — PLAN OF CARE
Problem: Patient Care Overview  Goal: Plan of Care Review  Outcome: Ongoing (interventions implemented as appropriate)  Recommendations     Recommendation/Intervention:   1. When medically able, advance diet to regular.  2. Upon diet advancement, add Boost Plus ONS.  3. If unable to advance diet & parenteral nutrition warranted, please re-consult RD.  4. RD following.

## 2018-01-24 NOTE — TREATMENT PLAN
Treatment Plan  01/24/2018  11:30 AM    Patient seen and case discussed with attending  CLD today and NPO after MN for DBE tomorrow.  Full consult note to follow.    Dale Puga M.D.  Gastroenterology Fellow, PGY-IV  Pager: [phone]  [facility]

## 2018-01-24 NOTE — ASSESSMENT & PLAN NOTE
Daily E and M and VAD Interrogation Note    Reason for Visit:  Patient is seen in follow up for management of:  [] HeartMate III     Interval History:  The [x] implant date was 7/27/2017.   Implantation of intracorporeal left ventricular assist device - HeartMate   III, under the CAP extension protocol.  Events overnight reviewed     Review of Systems:  Positive for GI bleeding  Denies dizziness or lightheadedness  All other systems reviewed and negative    Medications:  Current Facility-Administered Medications   Medication Dose Route Frequency Provider Last Rate Last Dose    0.9%  NaCl infusion (for blood administration)   Intravenous Q24H PRN Giovanni Hayden, DO        0.9%  NaCl infusion (for blood administration)   Intravenous Q24H PRN Darcy Gupta, PA        dronabinol capsule 5 mg  5 mg Oral TID AC Giovanni Hayden, DO        furosemide tablet 40 mg  40 mg Oral BID Giovanni Hayden, DO   40 mg at 01/24/18 0848    lisinopril tablet 5 mg  5 mg Oral Daily Giovanni Hayden, DO   5 mg at 01/24/18 0848    magnesium oxide tablet 400 mg  400 mg Oral BID Giovanni Hayden, DO   400 mg at 01/24/18 0848    pantoprazole injection 80 mg  80 mg Intravenous BID Giovanni Hayden, DO   80 mg at 01/24/18 0849    pravastatin tablet 20 mg  20 mg Oral QHS Giovanni Hayden, DO   20 mg at 01/23/18 2347    vancomycin 750 mg in normal saline 250 mL IVPB (ready to mix system)  750 mg Intravenous Q12H Giovanni Hayden, DO   750 mg at 01/24/18 0849       Physical Examination:  Vital Signs:   Vitals:    01/24/18 1100   BP:    Pulse: 63   Resp:    Temp:      Cardiovascular:  [x] Regular rate and rhythm [] Irregular []  None (MATT) []  Other  [x]  No edema []  Edema present  [x]  Clear to auscultation  VAD sounds smooth  Skin:  Incision is [x]  Clean, dry and intact.    Sternum:  [x]  Stable []  Unstable  Driveline(s):   [x]  Clean, dry and intact.       Labs:  CBC, BMP, LDH, INR reviewed      Procedure:  Device Interrogation including  analysis of device parameters.  Current Settings   [x]  Ventricular Assist Device  []  Total Artificial Heart interrogated  Review of device function is [x]  Stable     TXP LVAD INTERROGATIONS 1/24/2018 1/24/2018 1/24/2018 1/24/2018 1/24/2018 1/24/2018 1/24/2018   Type HeartMate3 HeartMate3 (No Data) HeartMate3 HeartMate3 HeartMate3 HeartMate3   Flow 4.3 4.1 - 4.0 4.1 4.0 4.1   Speed 5200 5200 - 5200 5200 5200 5200   PI 3.6 3.6 - 4.0 4.1 4.0 4.0   Power ([place]) 3.6 3.5 - 3.5 3.5 3.5 3.5   LSL - - - - - - -   Pulsatility - - - - Intermittent pulse No Pulse No Pulse       Assessment:  [x]  Primary Cardiomyopathy [x]  Congestive Heart Failure   []  Atrial Fibrillation []  Ventricular Tachycardia   []  Aftercare cardiac device [x]  Long term (current) use of anticoagulants   []  Ventilator-associated pneumonia []  Pneumonia viral, unspecified   []  Pneumonia, bacterial, unspecified []  Pneumonia, organism unspecified   [x]  Hemorrhage of GI tract, unspecified    []  Nosebleed              Plan:  [x]  Interval history obtained from ICU attending team member during rounding today  [x]  VAD/MATT teaching performed with patient  [x]  Mobilization / Physical Therapy ongoing  [x]  Anticoagulation []  Ongoing [x]  Held  []  GI consulted      Total time spent was 36 minutes.  Of which more than 50 percent of the care dominated counseling and coordinating care with different team members. The VAD was interrogated and all parameters were WNL and no significant findings were found in the history. All these findings are documented in the note above.      Date of Service: 01/24/2018

## 2018-01-24 NOTE — ASSESSMENT & PLAN NOTE
HM3 implanted 7/2017 as DT  Not on ASA at baseline.   No flow alarms on interrogation of LVAD.    INR theraputic Holding coumadin in setting of GI bleed   Intermittently pulsatile. MAP goal of 60-80  Continue lisinopril 5mg daily  No signs of acute volume overload will continue lasix 40 mg BID

## 2018-01-24 NOTE — NURSING
RN Proactive Rounding Note  Time of Visit:     Admit Date: 2018  LOS: 1  Code Status: Full Code   Date of Visit: 2018  : 1950  Age: 67 y.o.  Sex: male  Bed: CTSU 303/CTSU 303 A:   MRN: [id number]  Was the patient discharged from an ICU this admission? no  Was the patient discharged from a PACU within last 24 hours? no  Did the patient receive conscious sedation/general anesthesia in last 24 hours?no  Was the patient in the ED within the past 24 hours? Yes, [facility]  Was the patient started on NIPPV within the past 24 hours? no  Attending Physician: Graciela Bautista MD  Primary Service: Networked reference to record PCT       ASSESSMENT:     Modified Early Warning Score (MEWS): 1  Abnormal Vital Signs: WNL: H/H 4.9/15.2  Clinical Issues:Circulatory   INTERVENTIONS/ RECOMMENDATIONS:   Plan to give 3 u PRBC's , Holding Coumadin. GI consulted    Discussed plan of care with RN    PHYSICIAN ESCALATION:   No         Disposition: Call if needed    FOLLOW-UP/CONTINGENCY:       Call back the Rapid Response Nurse at x [phone]  for additional questions or concerns

## 2018-01-25 PROBLEM — K92.1 MELENA: Status: ACTIVE | Noted: 2018-01-01

## 2018-01-25 NOTE — H&P (VIEW-ONLY)
[facility]  Gastroenterology  Consult Note    Patient Name: Suman Hayden  MRN: [id number]  Admission Date: 1/23/2018  Hospital Length of Stay: 1 days  Code Status: Full Code   Attending Provider: Graciela Bautista MD   Consulting Provider: Maday Hood MD  Primary Care Physician: Joe Ernst MD  Principal Problem:GI bleed    Inpatient consult to Gastroenterology  Consult performed by: MADAY HOOD  Consult ordered by: SARA HAYDEN        Subjective:     HPI:  67 year old male with a PMHx of NICM (EF 10%) s/p HeartMate 3 (Implanted 7/27/2017 as DT), and recent ICD revision on 11/20/17 on who GI is being consulted for a GI bleed in the setting of a hemoglobin of 4.1 and melena on rectal.     Per patient after leaving the hospital he felt OK. He reported daily green bowel movements as well as compliance with PPI BID and not on NSAIDs (ASA was stopped but he was kept on Coumadin). However on routine labs he had a hemoglobin of 4.1 and INR of 3.7 and was asked to present to the ED. In the ED the provider documented melena on CAMDEN (witness by wife). Since he was HD stable INR was not rapidly reversed and [place] [facility] There has not been any further BM since being in the hospital. This morning after 2 units his hemoglobin is 6.6 and INR is 2.8. Furthermore he denies any chest pain, shortness of breath, nausea, emesis, or abdominal pain.     Prior GI procedure:  DBE 12/14/17  Normal esophagus.  Gastritis.  Multiple gastric polyps.  Mucosal changes in the duodenum.  The examined portion of the jejunum was normal.  A single (solitary) ulcer in the proximal ileum. Biopsied. Tattooed.  Stricture in the proximal ileum.  Presence of ileal foreign body. Removal was successful.    VCE 12/12/17  Multiple superficial ulcers throughout mid to distal small bowel.    EGD 12/8/17  Normal esophagus.  Gastritis and duodenitis. No gastrointestinal bleeding seen. Not biopsied due to  coagulopathy.    Colon 04/17:  6 mm polyp, small non-bleeding hemorrhoids, and diverticulosis    Past Medical History:   Diagnosis Date    Arthritis     Cardiomyopathy     CHF (congestive heart failure)     Coronary artery disease     Encounter for blood transfusion     Hyperlipidemia     Hypertension     Hypotension, iatrogenic     Obesity     S/P implantation of automatic cardioverter/defibrillator (AICD)     Ventricular tachycardia        Past Surgical History:   Procedure Laterality Date    ABDOMINAL SURGERY      CARDIAC CATHETERIZATION      CARDIAC DEFIBRILLATOR PLACEMENT      LEFT VENTRICULAR ASSIST DEVICE  07/27/2017       Review of patient's allergies indicates:  No Known Allergies  Family History     Problem Relation (Age of Onset)    Heart disease Mother, Father        Social History Main Topics    Smoking status: Never Smoker    Smokeless tobacco: Never Used    Alcohol use No    Drug use: No    Sexual activity: Not on file     Review of Systems   Constitutional: Negative for activity change, appetite change, chills, fatigue and fever.   HENT: Negative for trouble swallowing.    Respiratory: Negative.    Cardiovascular: Negative.    Gastrointestinal: Positive for blood in stool (on CAMDEN but not reported today).   Genitourinary: Negative.    Neurological: Negative for dizziness, tremors, syncope, weakness and headaches.     Objective:     Vital Signs (Most Recent):  Temp: 98.7 °F (37.1 °C) (01/24/18 1050)  Pulse: 63 (01/24/18 1100)  Resp: 18 (01/24/18 1050)  BP: 105/67 (01/24/18 1050)  SpO2: 99 % (01/24/18 1050) Vital Signs (24h Range):  Temp:  [98.4 °F (36.9 °C)-99.4 °F (37.4 °C)] 98.7 °F (37.1 °C)  Pulse:  [60-94] 63  Resp:  [14-20] 18  SpO2:  [96 %-100 %] 99 %  BP: ()/(0-67) 105/67     Weight: 72.2 kg (159 lb 2.8 oz) (01/24/18 1031)  Body mass index is 23.51 kg/m².      Intake/Output Summary (Last 24 hours) at 01/24/18 1445  Last data filed at 01/24/18 1300   Gross per 24 hour    Intake             1443 ml   Output             2176 ml   Net             -733 ml       Lines/Drains/Airways     Peripherally Inserted Central Catheter Line                 PICC Double Lumen 01/10/18 1055 right brachial 14 days          Line                 VAD 07/27/17 1312 Left ventricular assist device HeartMate 3 181 days                Physical Exam   Constitutional: He is oriented to person, place, and time. No distress.   HENT:   Head: Normocephalic and atraumatic.   Eyes: No scleral icterus.   Neck: Normal range of motion.   Cardiovascular: Normal rate and regular rhythm.    Pulmonary/Chest: Effort normal and breath sounds normal.   Abdominal: Soft. Bowel sounds are normal.   Neurological: He is alert and oriented to person, place, and time.   Skin: He is not diaphoretic.       Significant Labs:  CBC:   Recent Labs  Lab 01/23/18  2335 01/24/18  0922 01/24/18  1416   WBC 9.40 8.42 8.39   HGB 4.9* 6.6* 7.8*   HCT 15.2* 20.1* 23.5*   * 109* 114*     CMP:   Recent Labs  Lab 01/24/18  0525   GLU 84   CALCIUM 8.2*   ALBUMIN 2.2*   PROT 4.6*      K 3.1*   CO2 27      BUN 34*   CREATININE 0.8   ALKPHOS 49*   ALT 8*   AST 13   BILITOT 0.5     Coagulation:   Recent Labs  Lab 01/23/18  1344 01/24/18  0525   INR 3.7* 2.8*   APTT 35.2*  --        Significant Imaging:  Imaging results within the past 24 hours have been reviewed.    Assessment/Plan:     * GI bleed    67 year old male with a PMHx of NICM (EF 10%) s/p HeartMate 3 (Implanted 7/27/2017 as DT), and recent ICD revision on 11/20/17 on who GI is being consulted for a GI bleed in the setting of a hemoglobin of 4.1 and melena on rectal.     In the setting of his low hemoglobin and melena on CAMDEN as well as mildly elevated BUN it is likely that patient is once again bleeding. Although compliant with medication maybe the supra-therapetic INR contributed to the bleeding. Therefore we will perform a DBE tomorrow which will allow us to examine the  upper tract and reach the prior ulcer seen in December. Our concern is that is the current ulcer is bleeding our endoscopic interventions may be limited and we will have to consult surgery yet we first want to look before deciding. Further more it should be noted that his stricture cannot be transferred therefore we do not know if there are further lesions below that stricture.    Recommendations:  CLD today and NPO after MN for DBE in the morning-we will provide further recs post procedure  -Trend H/H and transfuse as needed  Obtain daily INR  Continue PPI IV BID            Thank you for your consult. I will follow-up with patient. Please contact us if you have any additional questions.      Dale Puga M.D.  Gastroenterology Fellow, PGY-IV  Pager: [phone]  [facility]

## 2018-01-25 NOTE — SUBJECTIVE & OBJECTIVE
Subjective:     Post-Op Info:  Procedure(s) (LRB):  DEVICE ASSISTED ENTEROSCOPY-ANTEROGRADE (N/A)   Day of Surgery          Medications:  Continuous Infusions:  Scheduled Meds:   dronabinol  5 mg Oral TID AC    furosemide  40 mg Oral BID    lisinopril  5 mg Oral Daily    magnesium oxide  400 mg Oral BID    mupirocin  1 g Nasal BID    pantoprazole  80 mg Intravenous BID    polyethylene glycol  2,000 mL Oral Once    [START ON 1/26/2018] polyethylene glycol  2,000 mL Oral Once    pravastatin  20 mg Oral QHS    vancomycin 750 mg in normal saline 250 mL IVPB (ready to mix system)  750 mg Intravenous Q12H     PRN Meds:sodium chloride, sodium chloride 0.9%     Objective:     Vital Signs (Most Recent):  Temp: 98.1 °F (36.7 °C) (01/25/18 1122)  Pulse: 76 (01/25/18 1122)  Resp: 18 (01/25/18 1122)  BP: (!) 74/0 (01/25/18 1145)  SpO2: 96 % (01/25/18 1122) Vital Signs (24h Range):  Temp:  [97.6 °F (36.4 °C)-99.2 °F (37.3 °C)] 98.1 °F (36.7 °C)  Pulse:  [63-86] 76  Resp:  [12-20] 18  SpO2:  [96 %-100 %] 96 %  BP: ()/(0-80) 74/0  Arterial Line BP: ()/(53-58) 96/53       Intake/Output Summary (Last 24 hours) at 01/25/18 1407  Last data filed at 01/25/18 1301   Gross per 24 hour   Intake             1280 ml   Output             2300 ml   Net            -1020 ml       Physical Exam   Constitutional: He is oriented to person, place, and time. He appears well-developed and well-nourished.   HENT:   Head: Normocephalic and atraumatic.   Eyes: EOM are normal. Pupils are equal, round, and reactive to light.   Neck: Normal range of motion. Neck supple.   Cardiovascular: Normal rate.    Pulmonary/Chest: Effort normal and breath sounds normal.   Abdominal: Soft. Bowel sounds are normal.   Musculoskeletal: Normal range of motion.   Neurological: He is alert and oriented to person, place, and time.   Nursing note and vitals reviewed.      Significant Labs:  BMP:   Recent Labs  Lab 01/25/18  0543   GLU 73      K 3.3*       CO2 27   BUN 25*   CREATININE 0.7   CALCIUM 8.6*   MG 1.8     CBC:   Recent Labs  Lab 01/25/18  0543   WBC 7.12   RBC 2.64*   HGB 7.7*   HCT 23.0*   *   MCV 87   MCH 29.2   MCHC 33.5     Coagulation:   Recent Labs  Lab 01/25/18  0543   INR 2.4*         Procedure: Device Interrogation Including analysis of device parameters  Current Settings: Ventricular Assist Device  Review of device function is stable  TXP LVAD INTERROGATIONS 1/25/2018 1/25/2018 1/25/2018 1/25/2018 1/25/2018 1/25/2018 1/25/2018   Type HeartMate3 HeartMate II HeartMate3 HeartMate3 HeartMate3 HeartMate3 HeartMate3   Flow 3.9 4.0 3.9 4.0 3.9 4.0 4.0   Speed 5200 5200 5200 5200 5200 5200 5200   PI 3.8 3.8 3.7 3.8 4.1 4.0 4.1   Power ([place]) 3.6 3.6 3.6 3.5 3.5 3.5 3.5   LSL - - - - - - -   Pulsatility No Pulse - - - - - -

## 2018-01-25 NOTE — TREATMENT PLAN
Treatment Plan  01/25/2018  11:12 AM    DBE performed today with impression and recs below:    Impression:             - Normal esophagus.  - Discolored and erythematous mucosa in the stomach.  - A few gastric polyps. These appear inflammatory and could be a potential source for bleeding if INR   is high. Unfortunately, these could not be treated or removed at this time due to an INR of 2.4. Manipulation at this time would likely cause more bleeding.  - Mucosal changes in the duodenum.  - Normal third portion of the duodenum and fourth portion of the duodenum.  - The examined portion of the jejunum was normal.  - A tattoo was seen in the ileum, proximal to the ulcer.  - A circuferential ulcer in the proximal ileum with severe stenosis. Unable to traverse. No stigmata of bleeding or treatable lesions. Tattooed for further marking.  - No specimens collected.    Recommendation:         - Return patient to [facility] for [facility] care.  - Continue present medications. Continue to hold Coumadin for now.  - Since we have not seen the small bowel distal to the ulcer, consider performing a lower device-assisted enteroscopy to evaluate this area. I will discuss with the inpatient GI consult alejandro.    Please place patient on CLD and then NPO after MN for retrograde double balloon by AES tomorrow. He will need to comply with Golytely prep and make sure he takes all of it.    Dale Puga M.D.  Gastroenterology Fellow, PGY-IV  Pager: [phone]  [facility]

## 2018-01-25 NOTE — PLAN OF CARE
Vss. sats 99% on room air.  vad equipment with pt.  And transferred back to room. Portable tele box on, confirmed with tech.  Pt's wife already back in room.  See flowsheet for vad numbers/equipment inventory.

## 2018-01-25 NOTE — PROGRESS NOTES
[facility]  Heart Transplant  Progress Note    Patient Name: Suman Hayden  MRN: [id number]  Admission Date: 1/23/2018  Hospital Length of Stay: 2 days  Attending Physician: Graciela Bautista MD  Primary Care Provider: Joe Ernst MD  Principal Problem:GI bleed    Subjective:     Interval History: no issues overnight, HH stable after 3 units of blood     Continuous Infusions:  Scheduled Meds:   dronabinol  5 mg Oral TID AC    furosemide  40 mg Oral BID    lisinopril  5 mg Oral Daily    magnesium oxide  400 mg Oral BID    pantoprazole  80 mg Intravenous BID    potassium chloride  60 mEq Oral Once    pravastatin  20 mg Oral QHS    vancomycin 750 mg in normal saline 250 mL IVPB (ready to mix system)  750 mg Intravenous Q12H     PRN Meds:sodium chloride, sodium chloride 0.9%    Review of patient's allergies indicates:  No Known Allergies  Objective:     Vital Signs (Most Recent):  Temp: 99 °F (37.2 °C) (01/25/18 0757)  Pulse: 80 (01/25/18 0757)  Resp: 20 (01/25/18 0757)  BP: 118/80 (01/25/18 0757)  SpO2: 100 % (01/25/18 0757) Vital Signs (24h Range):  Temp:  [98.2 °F (36.8 °C)-99.2 °F (37.3 °C)] 99 °F (37.2 °C)  Pulse:  [63-80] 80  Resp:  [18-20] 20  SpO2:  [96 %-100 %] 100 %  BP: ()/(0-80) 118/80     Patient Vitals for the past 72 hrs (Last 3 readings):   Weight   01/24/18 1031 72.2 kg (159 lb 2.8 oz)   01/24/18 0700 72.2 kg (159 lb 2.8 oz)   01/23/18 2252 72.7 kg (160 lb 6.2 oz)     Body mass index is 23.51 kg/m².      Intake/Output Summary (Last 24 hours) at 01/25/18 0911  Last data filed at 01/25/18 0500   Gross per 24 hour   Intake              600 ml   Output             3126 ml   Net            -2526 ml       Hemodynamic Parameters:           Physical Exam   Constitutional: He is oriented to person, place, and time. He appears well-developed and well-nourished. No distress.   HENT:   Head: Normocephalic and atraumatic.   Eyes: Pupils are equal, round, and reactive to light.  No scleral icterus.   Neck: Neck supple. No JVD present.   Cardiovascular:   + Smooth LVAD HUM    Pulmonary/Chest: Effort normal and breath sounds normal. He has no rales.   Abdominal: Soft. He exhibits no distension and no mass. There is no tenderness. There is no rebound.   Musculoskeletal: He exhibits no edema.   Neurological: He is alert and oriented to person, place, and time.   Skin: Skin is warm and dry. Capillary refill takes 2 to 3 seconds.   Psychiatric: He has a normal mood and affect. His behavior is normal. Judgment and thought content normal.   Vitals reviewed.      Significant Labs:  CBC:    Recent Labs  Lab 01/24/18  0922 01/24/18  1416 01/25/18  0543   WBC 8.42 8.39 7.12   RBC 2.32* 2.77* 2.64*   HGB 6.6* 7.8* 7.7*   HCT 20.1* 23.5* 23.0*   * 114* 116*   MCV 87 85 87   MCH 28.4 28.2 29.2   MCHC 32.8 33.2 33.5     BNP:    Recent Labs  Lab 01/23/18  1344 01/24/18  0525   * 617*     CMP:    Recent Labs  Lab 01/23/18  1344 01/24/18  0525 01/25/18  0543   * 84 73   CALCIUM 8.5* 8.2* 8.6*   ALBUMIN 2.8* 2.2*  --    PROT 5.5* 4.6*  --     142 144   K 3.3* 3.1* 3.3*   CO2 24 27 27    109 110   BUN 39* 34* 25*   CREATININE 0.9 0.8 0.7   ALKPHOS 55 49*  --    ALT 12 8*  --    AST 18 13  --    BILITOT 0.2 0.5  --       Coagulation:     Recent Labs  Lab 01/23/18  1344 01/24/18  0525 01/25/18  0543   INR 3.7* 2.8* 2.4*   APTT 35.2*  --   --      LDH:    Recent Labs  Lab 01/24/18  0525 01/25/18  0543    143     Microbiology:  Microbiology Results (last 7 days)     ** No results found for the last 168 hours. **          I have reviewed all pertinent labs within the past 24 hours.    Estimated Creatinine Clearance: 102.4 mL/min (based on SCr of 0.7 mg/dL).    Diagnostic Results:  I have reviewed and interpreted all pertinent imaging results/findings within the past 24 hours.    Assessment and Plan:     66 y/o male with a PMHx of NICM (EF 10%) s/p HeartMate 3 (Implanted  7/27/2017 as DT), HTN, DLD, poor appetite, recent ICD revision on 11/20/17 with Dr. Vidal complicated by pocket hematoma, recent admission for bacteremia (grew staph epi with negative GLENN) for which he has been on IV vancomycin per ID. He presents today after having dark stools over the past 48 hours in the setting of supratherapeutic INR. Home health nurse did labs today that showed Hgb of 4.1. He presented to [facility] where he has received 2 units of PRBCs and IV pantoprazole bolus. On presentation his vital signs and review of systems were grossly normal despite his very low Hgb so after discussion with Dr. Bautista it was decided to not emergently reverse his INR and he was transferred here for continuation of care. No LVAD alarms noted recently. States he feels like he is in his normal state of health with no recent illnesses. Denies NSAID use (aleve/ASA/ibuprofen)    Of note he does have a history of recent GIB bleed due to ileal (NSAID induced) ulcer requiring APC (double balloon enteroscopy by Dr. Chatterjee) and PRBC transfusion (Mid December). ASA stopped at time and PPI increased to BID.     * GI bleed    hgb stable after transfusion, will get DBE today  Pantoprazole 80mg IV BID   Not on ASA at baseline (held since December)   Gastroenterology consulted. NPO for scope today  Resume coumadin tonight         Bacteremia    Will continue outpatient regimen of Vancomycin   Will obtain trough in the AM         LVAD (left ventricular assist device) present    HM3 implanted 7/2017 as DT  Not on ASA at baseline.     INR theraputic resume low dose coumadin tonight, no heparin bridge   Intermittently pulsatile. MAP goal of 60-80  Continue lisinopril 5mg daily  No signs of acute volume overload will continue lasix 40 mg BID             MELISSA Long  Heart Transplant  [facility]

## 2018-01-25 NOTE — NURSING TRANSFER
Nursing Transfer Note      1/25/2018     Transfer To: 303 from pacu 2    Transfer via stretcher    Transfer with emergency vad equipment, tele box on, confirmed with tele tech, ok to transfer back to room.     Transported by shashi aggarwal     Medicines sent: none    Chart send with patient: Yes    Notified: spouse    Patient reassessed at: 1/25/18 1045    Upon arrival to floor: cardiac monitor applied, patient oriented to room, call bell in reach and bed in lowest position

## 2018-01-25 NOTE — ANESTHESIA POSTPROCEDURE EVALUATION
"Anesthesia Post Evaluation    Patient: Suman Hayden    Procedure(s) Performed: Procedure(s) (LRB):  DEVICE ASSISTED ENTEROSCOPY-ANTEROGRADE (N/A)    Final Anesthesia Type: general  Patient location during evaluation: PACU  Patient participation: Yes- Able to Participate  Level of consciousness: awake and alert  Post-procedure vital signs: reviewed and stable  Pain management: adequate  Airway patency: patent  PONV status at discharge: No PONV  Anesthetic complications: no      Cardiovascular status: blood pressure returned to baseline  Respiratory status: unassisted  Hydration status: euvolemic  Follow-up not needed.        Visit Vitals  BP 95/68 (BP Location: Left arm, Patient Position: Lying)   Pulse 76   Temp 36.7 °C (98.1 °F) (Oral)   Resp 18   Ht 5' 9" (1.753 m)   Wt 72.2 kg (159 lb 2.8 oz)   SpO2 96%   BMI 23.51 kg/m²       Pain/Gurpreet Score: Pain Assessment Performed: Yes (1/25/2018 10:48 AM)  Presence of Pain: denies (1/25/2018 10:48 AM)  Pain Rating Prior to Med Admin: 0 (1/25/2018 10:48 AM)  Gurpreet Score: 9 (1/25/2018 10:48 AM)      "

## 2018-01-25 NOTE — PROGRESS NOTES
[facility]  Cardiothoracic Surgery  Progress Note    Patient Name: Suman Hayden  MRN: [id number]  Admission Date: 1/23/2018  Hospital Length of Stay: 2 days  Code Status: Full Code   Attending Physician: Graciela Bautista MD   Referring Provider: Referring, Unknown  Principal Problem:GI bleed    Subjective:     Post-Op Info:  Procedure(s) (LRB):  DEVICE ASSISTED ENTEROSCOPY-ANTEROGRADE (N/A)   Day of Surgery     Subjective:     Post-Op Info:  Procedure(s) (LRB):  DEVICE ASSISTED ENTEROSCOPY-ANTEROGRADE (N/A)   Day of Surgery          Medications:  Continuous Infusions:  Scheduled Meds:   dronabinol  5 mg Oral TID AC    furosemide  40 mg Oral BID    lisinopril  5 mg Oral Daily    magnesium oxide  400 mg Oral BID    mupirocin  1 g Nasal BID    pantoprazole  80 mg Intravenous BID    polyethylene glycol  2,000 mL Oral Once    [START ON 1/26/2018] polyethylene glycol  2,000 mL Oral Once    pravastatin  20 mg Oral QHS    vancomycin 750 mg in normal saline 250 mL IVPB (ready to mix system)  750 mg Intravenous Q12H     PRN Meds:sodium chloride, sodium chloride 0.9%     Objective:     Vital Signs (Most Recent):  Temp: 98.1 °F (36.7 °C) (01/25/18 1122)  Pulse: 76 (01/25/18 1122)  Resp: 18 (01/25/18 1122)  BP: (!) 74/0 (01/25/18 1145)  SpO2: 96 % (01/25/18 1122) Vital Signs (24h Range):  Temp:  [97.6 °F (36.4 °C)-99.2 °F (37.3 °C)] 98.1 °F (36.7 °C)  Pulse:  [63-86] 76  Resp:  [12-20] 18  SpO2:  [96 %-100 %] 96 %  BP: ()/(0-80) 74/0  Arterial Line BP: ()/(53-58) 96/53       Intake/Output Summary (Last 24 hours) at 01/25/18 1407  Last data filed at 01/25/18 1301   Gross per 24 hour   Intake             1280 ml   Output             2300 ml   Net            -1020 ml       Physical Exam   Constitutional: He is oriented to person, place, and time. He appears well-developed and well-nourished.   HENT:   Head: Normocephalic and atraumatic.   Eyes: EOM are normal. Pupils are equal, round,  and reactive to light.   Neck: Normal range of motion. Neck supple.   Cardiovascular: Normal rate.    Pulmonary/Chest: Effort normal and breath sounds normal.   Abdominal: Soft. Bowel sounds are normal.   Musculoskeletal: Normal range of motion.   Neurological: He is alert and oriented to person, place, and time.   Nursing note and vitals reviewed.      Significant Labs:  BMP:   Recent Labs  Lab 01/25/18  0543   GLU 73      K 3.3*      CO2 27   BUN 25*   CREATININE 0.7   CALCIUM 8.6*   MG 1.8     CBC:   Recent Labs  Lab 01/25/18  0543   WBC 7.12   RBC 2.64*   HGB 7.7*   HCT 23.0*   *   MCV 87   MCH 29.2   MCHC 33.5     Coagulation:   Recent Labs  Lab 01/25/18  0543   INR 2.4*         Procedure: Device Interrogation Including analysis of device parameters  Current Settings: Ventricular Assist Device  Review of device function is stable  TXP LVAD INTERROGATIONS 1/25/2018 1/25/2018 1/25/2018 1/25/2018 1/25/2018 1/25/2018 1/25/2018   Type HeartMate3 HeartMate II HeartMate3 HeartMate3 HeartMate3 HeartMate3 HeartMate3   Flow 3.9 4.0 3.9 4.0 3.9 4.0 4.0   Speed 5200 5200 5200 5200 5200 5200 5200   PI 3.8 3.8 3.7 3.8 4.1 4.0 4.1   Power ([place]) 3.6 3.6 3.6 3.5 3.5 3.5 3.5   LSL - - - - - - -   Pulsatility No Pulse - - - - - -     Assessment/Plan:     LVAD (left ventricular assist device) present    Daily E and M and VAD Interrogation Note    Reason for Visit:  Patient is seen in follow up for management of:  [x] HeartMate III     Interval History:  The [x] implant date was 7/27/2017.   Implantation of intracorporeal left ventricular assist device - HeartMate   III, under the CAP extension protocol.  Events overnight reviewed     Review of Systems:  Positive for GI bleeding  Denies dizziness or lightheadedness  All other systems reviewed and negative    Medications:  Current Facility-Administered Medications   Medication Dose Route Frequency Provider Last Rate Last Dose    0.9%  NaCl infusion (for blood  administration)   Intravenous Q24H PRN Giovanni Hayden,         dronabinol capsule 5 mg  5 mg Oral TID AC Giovanni Hayden, DO   5 mg at 01/25/18 1142    furosemide tablet 40 mg  40 mg Oral BID Giovannimichael Hayden, DO   40 mg at 01/25/18 1141    lisinopril tablet 5 mg  5 mg Oral Daily Giovanni Hayden, DO   5 mg at 01/25/18 1140    magnesium oxide tablet 400 mg  400 mg Oral BID Giovannimichael Hayden, DO   400 mg at 01/25/18 1140    mupirocin 2 % ointment 1 g  1 g Nasal BID Giovanni Hayden, DO        pantoprazole injection 80 mg  80 mg Intravenous BID Giovanni Hayden, DO   80 mg at 01/25/18 1143    polyethylene glycol (GoLYTELY) solution  2,000 mL Oral Once Dale Puga MD        [START ON 1/26/2018] polyethylene glycol (GoLYTELY) solution  2,000 mL Oral Once Graciela Bautista MD        pravastatin tablet 20 mg  20 mg Oral QHS Giovanni Hayden, DO   20 mg at 01/24/18 2241    sodium chloride 0.9% flush 3 mL  3 mL Intravenous PRN Chandu Monterroso MD        vancomycin 750 mg in normal saline 250 mL IVPB (ready to mix system)  750 mg Intravenous Q12H Giovanni Hayden, DO   750 mg at 01/25/18 1301       Physical Examination:  Vital Signs:   Vitals:    01/25/18 1145   BP: (!) 74/0   Pulse:    Resp:    Temp:      Cardiovascular:  [x] Regular rate and rhythm [] Irregular []  None (MATT) []  Other  [x]  No edema []  Edema present  [x]  Clear to auscultation  VAD sounds smooth  Skin:  Incision is [x]  Clean, dry and intact.    Sternum:  [x]  Stable []  Unstable  Driveline(s):   [x]  Clean, dry and intact.       Labs:  CBC, BMP, LDH, INR reviewed      Procedure:  Device Interrogation including analysis of device parameters.  Current Settings   [x]  Ventricular Assist Device  []  Total Artificial Heart interrogated  Review of device function is [x]  Stable     TXP LVAD INTERROGATIONS 1/25/2018 1/25/2018 1/25/2018/2018 1/25/2018 1/25/2018 1/25/2018 1/25/2018   Type HeartMate3 HeartMate II HeartMate3 HeartMate3 HeartMate3 HeartMate3 HeartMate3    Flow 3.9 4.0 3.9 4.0 3.9 4.0 4.0   Speed 5200 5200 5200 5200 5200 5200 5200   PI 3.8 3.8 3.7 3.8 4.1 4.0 4.1   Power ([place]) 3.6 3.6 3.6 3.5 3.5 3.5 3.5   LSL - - - - - - -   Pulsatility No Pulse - - - - - -       Assessment:  [x]  Primary Cardiomyopathy [x]  Congestive Heart Failure   []  Atrial Fibrillation []  Ventricular Tachycardia   []  Aftercare cardiac device [x]  Long term (current) use of anticoagulants   []  Ventilator-associated pneumonia []  Pneumonia viral, unspecified   []  Pneumonia, bacterial, unspecified []  Pneumonia, organism unspecified   [x]  Hemorrhage of GI tract, unspecified    []  Nosebleed              Plan:  [x]  Interval history obtained from ICU attending team member during rounding today  [x]  VAD/MATT teaching performed with patient  [x]  Mobilization / Physical Therapy ongoing  [x]  Anticoagulation []  Ongoing [x]  Held  [x]  GI consulted      Total time spent was 37 minutes.  Of which more than 50 percent of the care dominated counseling and coordinating care with different team members. The VAD was interrogated and all parameters were WNL and no significant findings were found in the history. All these findings are documented in the note above.      Date of Service: 01/25/2018                 Patient seen and evaluated with relevant plan as above. Documented in the note above Interrogation of Ventricular assist device was performed with physician analysis of device parameters and review of device function. I have personally reviewed the interrogation findings and agree with findings as stated.

## 2018-01-25 NOTE — PROGRESS NOTES
Dr jiang updating pt at bedside in pacu.  Per dr jiang, ok for pt to have boost when arrives to room.  Then clear liquids. Npo after midnight for possible colonscopy. 3rd floor rn notified as well. Pt verbalizes understanding.

## 2018-01-25 NOTE — PROGRESS NOTES
Dr. Negrete notified patient's MAP 65 and DP 64, scheduled to receive 40mg Lasix PO. MD orders to HOLD Lasix at this time and monitor BPs. Will continue to monitor.

## 2018-01-25 NOTE — TRANSFER OF CARE
"Anesthesia Transfer of Care Note    Patient: Suman Hayden    Procedure(s) Performed: Procedure(s) (LRB):  DEVICE ASSISTED ENTEROSCOPY-ANTEROGRADE (N/A)    Patient location: PACU    Anesthesia Type: general    Transport from OR: Transported from OR on 6-10 L/min O2 by face mask with adequate spontaneous ventilation    Post pain: adequate analgesia    Post assessment: no apparent anesthetic complications and tolerated procedure well    Post vital signs: stable    Level of consciousness: awake, alert and oriented    Nausea/Vomiting: no nausea/vomiting    Complications: none    Transfer of care protocol was followed      Last vitals:   Visit Vitals  BP 90/64 (BP Location: Left arm, Patient Position: Lying)   Pulse 80   Temp 36.4 °C (97.6 °F) (Axillary)   Resp 20   Ht 5' 9" (1.753 m)   Wt 72.2 kg (159 lb 2.8 oz)   SpO2 100%   BMI 23.51 kg/m²     "

## 2018-01-25 NOTE — SUBJECTIVE & OBJECTIVE
Interval History: no issues overnight, HH stable after 3 units of blood     Continuous Infusions:  Scheduled Meds:   dronabinol  5 mg Oral TID AC    furosemide  40 mg Oral BID    lisinopril  5 mg Oral Daily    magnesium oxide  400 mg Oral BID    pantoprazole  80 mg Intravenous BID    potassium chloride  60 mEq Oral Once    pravastatin  20 mg Oral QHS    vancomycin 750 mg in normal saline 250 mL IVPB (ready to mix system)  750 mg Intravenous Q12H     PRN Meds:sodium chloride, sodium chloride 0.9%    Review of patient's allergies indicates:  No Known Allergies  Objective:     Vital Signs (Most Recent):  Temp: 99 °F (37.2 °C) (01/25/18 0757)  Pulse: 80 (01/25/18 0757)  Resp: 20 (01/25/18 0757)  BP: 118/80 (01/25/18 0757)  SpO2: 100 % (01/25/18 0757) Vital Signs (24h Range):  Temp:  [98.2 °F (36.8 °C)-99.2 °F (37.3 °C)] 99 °F (37.2 °C)  Pulse:  [63-80] 80  Resp:  [18-20] 20  SpO2:  [96 %-100 %] 100 %  BP: ()/(0-80) 118/80     Patient Vitals for the past 72 hrs (Last 3 readings):   Weight   01/24/18 1031 72.2 kg (159 lb 2.8 oz)   01/24/18 0700 72.2 kg (159 lb 2.8 oz)   01/23/18 2252 72.7 kg (160 lb 6.2 oz)     Body mass index is 23.51 kg/m².      Intake/Output Summary (Last 24 hours) at 01/25/18 0911  Last data filed at 01/25/18 0500   Gross per 24 hour   Intake              600 ml   Output             3126 ml   Net            -2526 ml       Hemodynamic Parameters:           Physical Exam   Constitutional: He is oriented to person, place, and time. He appears well-developed and well-nourished. No distress.   HENT:   Head: Normocephalic and atraumatic.   Eyes: Pupils are equal, round, and reactive to light. No scleral icterus.   Neck: Neck supple. No JVD present.   Cardiovascular:   + Smooth LVAD HUM    Pulmonary/Chest: Effort normal and breath sounds normal. He has no rales.   Abdominal: Soft. He exhibits no distension and no mass. There is no tenderness. There is no rebound.   Musculoskeletal: He exhibits  no edema.   Neurological: He is alert and oriented to person, place, and time.   Skin: Skin is warm and dry. Capillary refill takes 2 to 3 seconds.   Psychiatric: He has a normal mood and affect. His behavior is normal. Judgment and thought content normal.   Vitals reviewed.      Significant Labs:  CBC:    Recent Labs  Lab 01/24/18  0922 01/24/18  1416 01/25/18  0543   WBC 8.42 8.39 7.12   RBC 2.32* 2.77* 2.64*   HGB 6.6* 7.8* 7.7*   HCT 20.1* 23.5* 23.0*   * 114* 116*   MCV 87 85 87   MCH 28.4 28.2 29.2   MCHC 32.8 33.2 33.5     BNP:    Recent Labs  Lab 01/23/18  1344 01/24/18  0525   * 617*     CMP:    Recent Labs  Lab 01/23/18  1344 01/24/18  0525 01/25/18  0543   * 84 73   CALCIUM 8.5* 8.2* 8.6*   ALBUMIN 2.8* 2.2*  --    PROT 5.5* 4.6*  --     142 144   K 3.3* 3.1* 3.3*   CO2 24 27 27    109 110   BUN 39* 34* 25*   CREATININE 0.9 0.8 0.7   ALKPHOS 55 49*  --    ALT 12 8*  --    AST 18 13  --    BILITOT 0.2 0.5  --       Coagulation:     Recent Labs  Lab 01/23/18  1344 01/24/18  0525 01/25/18  0543   INR 3.7* 2.8* 2.4*   APTT 35.2*  --   --      LDH:    Recent Labs  Lab 01/24/18  0525 01/25/18  0543    143     Microbiology:  Microbiology Results (last 7 days)     ** No results found for the last 168 hours. **          I have reviewed all pertinent labs within the past 24 hours.    Estimated Creatinine Clearance: 102.4 mL/min (based on SCr of 0.7 mg/dL).    Diagnostic Results:  I have reviewed and interpreted all pertinent imaging results/findings within the past 24 hours.

## 2018-01-25 NOTE — PROVATION PATIENT INSTRUCTIONS
Discharge Summary/Instructions after an Endoscopic Procedure  Patient Name: Suman Hayden  Patient MRN: [id number]  Patient YOB: 1950 Thursday, January 25, 2018  Andres Chatterjee MD  RESTRICTIONS:  During your procedure today, you received medications for sedation.  These   medications may affect your judgment, balance and coordination.  Therefore,   for 24 hours, you have the following restrictions:   - DO NOT drive a car, operate machinery, make legal/financial decisions,   sign important papers or drink alcohol.    ACTIVITY:  The following day: return to full activity including work, except no heavy   lifting, straining or running for 3 days if polyps were removed.  DIET:  Eat and drink normally unless instructed otherwise.     TREATMENT FOR COMMON SIDE EFFECTS:  - Mild abdominal pain, belching, bloating or excessive gas: rest, eat   lightly and use a heating pad.  - Sore Throat: treat with throat lozenges and/or gargle with warm salt   water.  SYMPTOMS TO WATCH FOR AND REPORT TO YOUR PHYSICIAN:  1. Abdominal pain or bloating, other than gas cramps.  2. Chest pain.  3. Back pain.  4. Chills or fever occurring within 24 hours after the procedure.  5. Rectal bleeding, which would show as bright red, maroon, or black stools.   (A tablespoon of blood from the rectum is not serious, especially if   hemorrhoids are present.)  6. Vomiting.  7. Weakness or dizziness.  8. Because air was used during the procedure, expelling large amounts of air   from your rectum or belching is normal.  9. If a bowel prep was taken, you may not have a bowel movement for 1-3   days.  This is normal.  GO DIRECTLY TO THE NEAREST EMERGENCY ROOM IF YOU HAVE ANY OF THE FOLLOWING:      Difficulty breathing  Chills and/or fever over 101 F   Persistent vomiting and/or vomiting blood   Severe abdominal pain   Severe chest pain   Black, tarry stools   Bleeding- more than one tablespoon   Any other symptom or condition that you may feel  needs urgent attention  Your doctor recommends these additional instructions:  If any biopsies were taken, your doctor s clinic will contact you in 1 to 2   weeks with any results.  Continue your present medications.   Your physician has recommended a lower device-assisted enteroscopy.  For questions, problems or results please call your physician - Andres Chatterjee MD at Work:  [phone].  [facility], EMERGENCY ROOM PHONE NUMBER: [phone]  IF A COMPLICATION OR EMERGENCY SITUATION ARISES AND YOU ARE UNABLE TO REACH   YOUR PHYSICIAN - GO DIRECTLY TO THE EMERGENCY ROOM.  Andres Chatterjee MD  1/25/2018 10:24:41 AM  This report has been verified and signed electronically.

## 2018-01-25 NOTE — ASSESSMENT & PLAN NOTE
Daily E and M and VAD Interrogation Note    Reason for Visit:  Patient is seen in follow up for management of:  [x] HeartMate III     Interval History:  The [x] implant date was 7/27/2017.   Implantation of intracorporeal left ventricular assist device - HeartMate   III, under the CAP extension protocol.  Events overnight reviewed     Review of Systems:  Positive for GI bleeding  Denies dizziness or lightheadedness  All other systems reviewed and negative    Medications:  Current Facility-Administered Medications   Medication Dose Route Frequency Provider Last Rate Last Dose    0.9%  NaCl infusion (for blood administration)   Intravenous Q24H PRN Giovanni Hayden,         dronabinol capsule 5 mg  5 mg Oral TID AC Giovanni Hayden DO   5 mg at 01/25/18 1142    furosemide tablet 40 mg  40 mg Oral BID Giovanni Hayden, DO   40 mg at 01/25/18 1141    lisinopril tablet 5 mg  5 mg Oral Daily Giovanni Hayden DO   5 mg at 01/25/18 1140    magnesium oxide tablet 400 mg  400 mg Oral BID Giovanni Hayden, DO   400 mg at 01/25/18 1140    mupirocin 2 % ointment 1 g  1 g Nasal BID Giovanni Hayden, DO        pantoprazole injection 80 mg  80 mg Intravenous BID Giovanni Hayden, DO   80 mg at 01/25/18 1143    polyethylene glycol (GoLYTELY) solution  2,000 mL Oral Once Dale Puga MD        [START ON 1/26/2018] polyethylene glycol (GoLYTELY) solution  2,000 mL Oral Once Graciela Bautista MD        pravastatin tablet 20 mg  20 mg Oral QHS Giovanni Hayden DO   20 mg at 01/24/18 2241    sodium chloride 0.9% flush 3 mL  3 mL Intravenous PRN Chandu Monterroso MD        vancomycin 750 mg in normal saline 250 mL IVPB (ready to mix system)  750 mg Intravenous Q12H Giovanni Hayden, DO   750 mg at 01/25/18 1301       Physical Examination:  Vital Signs:   Vitals:    01/25/18 1145   BP: (!) 74/0   Pulse:    Resp:    Temp:      Cardiovascular:  [x] Regular rate and rhythm [] Irregular []  None (MATT) []  Other  [x]  No edema []  Edema  present  [x]  Clear to auscultation  VAD sounds smooth  Skin:  Incision is [x]  Clean, dry and intact.    Sternum:  [x]  Stable []  Unstable  Driveline(s):   [x]  Clean, dry and intact.       Labs:  CBC, BMP, LDH, INR reviewed      Procedure:  Device Interrogation including analysis of device parameters.  Current Settings   [x]  Ventricular Assist Device  []  Total Artificial Heart interrogated  Review of device function is [x]  Stable     TXP LVAD INTERROGATIONS 1/25/2018 1/25/2018 1/25/2018 1/25/2018 1/25/2018 1/25/2018 1/25/2018   Type HeartMate3 HeartMate II HeartMate3 HeartMate3 HeartMate3 HeartMate3 HeartMate3   Flow 3.9 4.0 3.9 4.0 3.9 4.0 4.0   Speed 5200 5200 5200 5200 5200 5200 5200   PI 3.8 3.8 3.7 3.8 4.1 4.0 4.1   Power ([place]) 3.6 3.6 3.6 3.5 3.5 3.5 3.5   LSL - - - - - - -   Pulsatility No Pulse - - - - - -       Assessment:  [x]  Primary Cardiomyopathy [x]  Congestive Heart Failure   []  Atrial Fibrillation []  Ventricular Tachycardia   []  Aftercare cardiac device [x]  Long term (current) use of anticoagulants   []  Ventilator-associated pneumonia []  Pneumonia viral, unspecified   []  Pneumonia, bacterial, unspecified []  Pneumonia, organism unspecified   [x]  Hemorrhage of GI tract, unspecified    []  Nosebleed              Plan:  [x]  Interval history obtained from ICU attending team member during rounding today  [x]  VAD/MATT teaching performed with patient  [x]  Mobilization / Physical Therapy ongoing  [x]  Anticoagulation []  Ongoing [x]  Held  [x]  GI consulted      Total time spent was 37 minutes.  Of which more than 50 percent of the care dominated counseling and coordinating care with different team members. The VAD was interrogated and all parameters were WNL and no significant findings were found in the history. All these findings are documented in the note above.      Date of Service: 01/25/2018

## 2018-01-25 NOTE — PROGRESS NOTES
Study: [employer] 3 CAP  Sponsor: [employer]  Study Visit: 180 Day Post-Surgery  Date of Visit: 1/25/2018     Patient was seen today for his 180 day post-surgery VAD lab collection visit for the [facility] [employer] Substudy. All blood and urine was collected and processed per protocol.

## 2018-01-25 NOTE — ANESTHESIA PROCEDURE NOTES
Arterial    Diagnosis: GI bleed/ LVAD    Patient location during procedure: done in OR  Procedure start time: 1/25/2018 8:14 AM  Timeout: 1/25/2018 8:14 AM  Procedure end time: 1/25/2018 8:18 AM  Staffing  Anesthesiologist: GYPSY POZO  Performed: anesthesiologist   Anesthesiologist was present at the time of the procedure.  Preanesthetic Checklist  Completed: patient identified, site marked, surgical consent, pre-op evaluation, timeout performed, IV checked, risks and benefits discussed, monitors and equipment checked and anesthesia consent givenArterial  Skin Prep: chlorhexidine gluconate and isopropyl alcohol  Local Infiltration: lidocaine  Orientation: left  Location: radial  Catheter Size: 20 G  Catheter placement by Ultrasound guidance. Heme positive aspiration all ports.  Vessel Caliber: medium, patent, compressibility normalInsertion Attempts: 1  Assessment  Dressing: secured with tape and tegaderm  Patient: Tolerated well

## 2018-01-25 NOTE — INTERVAL H&P NOTE
Pre-Procedure H and P Addendum    Patient seen and examined.  History and exam unchanged from prior history and physical.      Procedure: Antegrade DBE  Indication: GI bleeding with melena, suspected recurrent small bowel bleeding  ASA Class: per anesthesiology  Airway: normal  Neck Mobility: full range of motion  Mallampatti score: per anesthesia  History of anesthesia problems: no  Family history of anesthesia problems: no  Anesthesia Plan: MAC    Anesthesia/Surgery risks, benefits and alternative options discussed and understood by patient/family.          Active Hospital Problems    Diagnosis  POA    *GI bleed [K92.2]  Yes    Melena [K92.1]  Unknown    Bacteremia [R78.81]  Yes    AICD (automatic cardioverter/defibrillator) present [Z95.810]  Yes    LVAD (left ventricular assist device) present [Z95.811]  Not Applicable      Resolved Hospital Problems    Diagnosis Date Resolved POA   No resolved problems to display.

## 2018-01-25 NOTE — PLAN OF CARE
Problem: Patient Care Overview  Goal: Plan of Care Review  Outcome: Ongoing (interventions implemented as appropriate)  LVAD BP, DPs and numbers all WNL. No alarms/events noted in LVAD hx.  Pt NPO.  Scheduled for GI scope.  INR 2.8. Pt free of falls, trauma and injury. Skin remains clean dry and intact with no breakdown.  Pt educated on importance of measuring accurate  I/O. Pt in no pain. Reviewed plan of care with Pt and wife, verbalized understanding.NADN. Will continue to monitor.

## 2018-01-25 NOTE — NURSING TRANSFER
Nursing Transfer Note      1/25/2018     Transfer To: Endosscopy    Transfer via stretcher    Transfer with cardiac monitoring    Transported by RN    Medicines sent: none    Chart send with patient: Yes    Notified: spouse walking with patient    Transported by LVAD certified RN, with emergency equipment, PM and monitor; to be returned with patient

## 2018-01-25 NOTE — ASSESSMENT & PLAN NOTE
hgb stable after transfusion, will get DBE today  Pantoprazole 80mg IV BID   Not on ASA at baseline (held since December)   Gastroenterology consulted. NPO for scope today  Resume coumadin tonight

## 2018-01-25 NOTE — ASSESSMENT & PLAN NOTE
HM3 implanted 7/2017 as DT  Not on ASA at baseline.     INR theraputic resume low dose coumadin tonight, no heparin bridge   Intermittently pulsatile. MAP goal of 60-80  Continue lisinopril 5mg daily  No signs of acute volume overload will continue lasix 40 mg BID

## 2018-01-25 NOTE — PLAN OF CARE
Problem: Patient Care Overview  Goal: Plan of Care Review  Outcome: Ongoing (interventions implemented as appropriate)  Discussed Plan of Care with patient. VS stable. No events on LVAD history. Ambulates well with stand-by assist. Fall precautions in place. Bleeding precautions reviewed and maintained. Pain denied. No source of bleeding found during Scope. Clear liquid diet initiated, NPO after midnight. Bowel program this PM for colonoscopy tomorrow. Patient remained free of falls/ injury. All questions and concerns were addressed. Will continue to monitor patient.

## 2018-01-26 NOTE — PROGRESS NOTES
DISCHARGE    SW to pt's room today for possible weekend discharge.  Pt presents as lying in bed with wife Kia at bedside.  Pt and wife both present as aao x4, calm, cooperative, and asking and answering questions appropriately.  Pt and wife verbalize understanding and agreement with plan for possible d/c home this weekend, pending labs are in-range.  Wife reports she will transport pt home at time of d/c.  Pt requesting to resume services with [place] [facility] at d/c.  SW notified [facility] of possible weekend d/c; [facility] will update [place].  Pt reports coping adequately at this time, and denies any needs or concerns to SW.  SW providing ongoing psychosocial and counseling support, education, resources, assistance, and discharge planning as indicated.  SW remains available.

## 2018-01-26 NOTE — PROGRESS NOTES
[facility]  Heart Transplant  Progress Note    Patient Name: Suman Hayden  MRN: [id number]  Admission Date: 1/23/2018  Hospital Length of Stay: 3 days  Attending Physician: Graciela Bautista MD  Primary Care Provider: Joe Ernst MD  Principal Problem:GI bleed    Subjective:     Interval History: No complaints. Numerous BMs overnight with some bright red blood. INR 1.9 today, Will go for retro-DBE today with GI. Denies chest pain, SOB, NVD.    Continuous Infusions:  Scheduled Meds:   dronabinol  5 mg Oral TID AC    furosemide  40 mg Oral BID    lisinopril  5 mg Oral Daily    magnesium oxide  400 mg Oral BID    mupirocin  1 g Nasal BID    pantoprazole  80 mg Intravenous BID    pravastatin  20 mg Oral QHS    vancomycin 750 mg in normal saline 250 mL IVPB (ready to mix system)  750 mg Intravenous Q12H     PRN Meds:sodium chloride, sodium chloride 0.9%    Review of patient's allergies indicates:  No Known Allergies  Objective:     Vital Signs (Most Recent):  Temp: 98.9 °F (37.2 °C) (01/26/18 0722)  Pulse: 79 (01/26/18 0722)  Resp: 16 (01/26/18 0722)  BP: (!) 84/0 (01/26/18 0723)  SpO2: 99 % (01/26/18 0722) Vital Signs (24h Range):  Temp:  [97.7 °F (36.5 °C)-99.2 °F (37.3 °C)] 98.9 °F (37.2 °C)  Pulse:  [70-86] 79  Resp:  [12-18] 16  SpO2:  [95 %-100 %] 99 %  BP: ()/(0-73) 84/0  Arterial Line BP: (96)/(53) 96/53     Patient Vitals for the past 72 hrs (Last 3 readings):   Weight   01/26/18 0833 71 kg (156 lb 8.4 oz)   01/24/18 1031 72.2 kg (159 lb 2.8 oz)   01/24/18 0700 72.2 kg (159 lb 2.8 oz)     Body mass index is 23.11 kg/m².      Intake/Output Summary (Last 24 hours) at 01/26/18 1024  Last data filed at 01/26/18 0500   Gross per 24 hour   Intake             3570 ml   Output             1825 ml   Net             1745 ml     Hemodynamic Parameters:    Physical Exam   Constitutional: He is oriented to person, place, and time. He appears well-developed and well-nourished. No  distress.   HENT:   Head: Normocephalic and atraumatic.   Eyes: Pupils are equal, round, and reactive to light. No scleral icterus.   Neck: Neck supple. No JVD present.   Cardiovascular:   + Smooth LVAD HUM    Pulmonary/Chest: Effort normal and breath sounds normal. He has no rales.   Abdominal: Soft. He exhibits no distension and no mass. There is no tenderness. There is no rebound.   Musculoskeletal: He exhibits no edema.   Neurological: He is alert and oriented to person, place, and time.   Skin: Skin is warm and dry. Capillary refill takes 2 to 3 seconds.   Psychiatric: He has a normal mood and affect. His behavior is normal. Judgment and thought content normal.   Vitals reviewed.      Significant Labs:  CBC:    Recent Labs  Lab 01/24/18  1416 01/25/18  0543 01/26/18  0458   WBC 8.39 7.12 7.47   RBC 2.77* 2.64* 2.48*   HGB 7.8* 7.7* 7.2*   HCT 23.5* 23.0* 22.0*   * 116* 103*   MCV 85 87 89   MCH 28.2 29.2 29.0   MCHC 33.2 33.5 32.7     BNP:    Recent Labs  Lab 01/23/18  1344 01/24/18  0525 01/26/18  0458   * 617* 865*     CMP:    Recent Labs  Lab 01/23/18  1344 01/24/18  0525 01/25/18  0543 01/26/18  0458   * 84 73 85   CALCIUM 8.5* 8.2* 8.6* 8.5*   ALBUMIN 2.8* 2.2*  --  2.5*   PROT 5.5* 4.6*  --  5.2*    142 144 146*   K 3.3* 3.1* 3.3* 3.4*   CO2 24 27 27 28    109 110 113*   BUN 39* 34* 25* 15   CREATININE 0.9 0.8 0.7 0.8   ALKPHOS 55 49*  --  62   ALT 12 8*  --  24   AST 18 13  --  32   BILITOT 0.2 0.5  --  0.6      Coagulation:     Recent Labs  Lab 01/23/18  1344 01/24/18  0525 01/25/18  0543 01/26/18  0458   INR 3.7* 2.8* 2.4* 1.9*   APTT 35.2*  --   --   --      LDH:    Recent Labs  Lab 01/24/18  0525 01/25/18  0543 01/26/18  0458    143 145     Microbiology:  Microbiology Results (last 7 days)     ** No results found for the last 168 hours. **          I have reviewed all pertinent labs within the past 24 hours.    Estimated Creatinine Clearance: 89.6 mL/min (based  on SCr of 0.8 mg/dL).    Diagnostic Results:  I have reviewed and interpreted all pertinent imaging results/findings within the past 24 hours.    Assessment and Plan:     68 y/o male with a PMHx of NICM (EF 10%) s/p HeartMate 3 (Implanted 7/27/2017 as DT), HTN, DLD, poor appetite, recent ICD revision on 11/20/17 with Dr. Vidal complicated by pocket hematoma, recent admission for bacteremia (grew staph epi with negative GLENN) for which he has been on IV vancomycin per ID. He presents today after having dark stools over the past 48 hours in the setting of supratherapeutic INR. Home health nurse did labs today that showed Hgb of 4.1. He presented to [facility] where he has received 2 units of PRBCs and IV pantoprazole bolus. On presentation his vital signs and review of systems were grossly normal despite his very low Hgb so after discussion with Dr. Bautista it was decided to not emergently reverse his INR and he was transferred here for continuation of care. No LVAD alarms noted recently. States he feels like he is in his normal state of health with no recent illnesses. Denies NSAID use (aleve/ASA/ibuprofen)    Of note he does have a history of recent GIB bleed due to ileal (NSAID induced) ulcer requiring APC (double balloon enteroscopy by Dr. Chatterjee) and PRBC transfusion (Mid December). ASA stopped at time and PPI increased to BID.     * GI bleed    - HGB slowly down trending  - DBE yesterday with no active bleeds but noted ulcers and polyps. Plan to retrograde DBE today, INR 1.9   - Pantoprazole 80mg IV BID   - Not on ASA at baseline (held since December)   - Resume coumadin tonight         LVAD (left ventricular assist device) present    - HM3 implanted 7/2017 as DT  - Not on ASA at baseline.    - INR sub therapeutic, no heparin bridge. Resume low dose coumadin if OK with GI  - Intermittently pulsatile. MAP goal of 60-80  - Continue lisinopril 5mg daily  - No signs of acute volume overload, but has done well on lasix  40 mg BID at home. Hold today as patient is NPO and was taking golytely overnight. Can resume once eating and drinking        Bacteremia    - Will continue outpatient regimen of Vancomycin (S. Epi bacteremia from [facility] source, end date estimation 2/19/18)  - Will obtain trough in the AM             Isabel Chen PA-C  Heart Transplant  [facility]

## 2018-01-26 NOTE — PLAN OF CARE
Pt verbalized understanding of plan of care.  Personal belongings: Wedding band noted to left hand.

## 2018-01-26 NOTE — ANESTHESIA RELEASE NOTE
"Anesthesia Release from PACU Note    Patient: Suman Hayden    Procedure(s) Performed: Procedure(s) (LRB):  Retrograde deivce assisted enteroscopy (N/A)    Anesthesia type: GEN    Post pain: Adequate analgesia reported    Post assessment: no apparent anesthetic complications, tolerated procedure well and no evidence of recall    Post vital signs: /70 (BP Location: Left arm, Patient Position: Lying)   Pulse 82   Temp 36.5 °C (97.7 °F) (Oral)   Resp 15   Ht 5' 9" (1.753 m)   Wt 71 kg (156 lb 8.4 oz)   SpO2 100%   BMI 23.11 kg/m²     Level of consciousness: awake, alert and oriented    Nausea/Vomiting: no nausea/no vomiting    Complications: none    Airway Patency: patent    Respiratory: unassisted, spontaneous ventilation, nc    Cardiovascular: stable and blood pressure at baseline    Hydration: euvolemic    "

## 2018-01-26 NOTE — PROCEDURES
Patient AAO with no family at bedside. VAD interrogation completed this AM in the event changes needed to be made. Will continue to monitor for further issues.     Pulsatile: Yes, intermittent   VAD Sounds: HM3  Smooth  Problems / Issues / Alarms with VAD if any: None noted  HCT: 22    VAD Interrogation:  TXP LVAD INTERROGATIONS 1/26/2018 1/26/2018 1/26/2018 1/26/2018 1/26/2018 1/26/2018 1/26/2018   Type HeartMate3 HeartMate3 HeartMate3 HeartMate3 HeartMate3 HeartMate3 HeartMate3   Flow 4.1 4.2 4.4 4.3 4.3 4.3 4.5   Speed 5200 5200 5200 5200 5200 5200 5250   PI 3.9 3.7 3.6 3.9 3.8 3.7 4.0   Power ([place]) 3.6 3.5 3.5 3.5 3.5 3.5 3.5   LSL - - - - - - -   Pulsatility - - - - - - -   }

## 2018-01-26 NOTE — ASSESSMENT & PLAN NOTE
- HGB slowly down trending  - DBE yesterday with no active bleeds but noted ulcers and polyps. Plan to retrograde DBE today, INR 1.9   - Pantoprazole 80mg IV BID   - Not on ASA at baseline (held since December)   - Resume coumadin tonight

## 2018-01-26 NOTE — PLAN OF CARE
Problem: Patient Care Overview  Goal: Plan of Care Review  Outcome: Revised  Plan of care discussed with patient. Patient is free of fall/trauma/injury. Denies CP, SOB, or pain/discomfort. VAD numbers WNL.  Pt receiving colonoscopy today.  All questions addressed. Will continue to monitor

## 2018-01-26 NOTE — H&P
Pre-Procedure H and P Addendum    Patient seen and examined.  History and exam unchanged from prior history and physical.      Procedure: Retrograde Device assisted enteroscopy  Indication: GI bleed  ASA Class:III  Airway: normal  Neck Mobility: full range of motion  Mallampatti score: per anesthesia    Anesthesia Plan: MAC/General    The impression and plan was discussed in detail with the patient and family. All questions have been answered and the patient voices understanding of our plan at this point. The risk of the procedure was discussed in detail which includes but not limited to bleeding, infection, perforation in some cases requiring surgery with its spectrum of complications.

## 2018-01-26 NOTE — ANESTHESIA POSTPROCEDURE EVALUATION
"Anesthesia Post Evaluation    Patient: Suman Hayden    Procedure(s) Performed: Procedure(s) (LRB):  Retrograde deivce assisted enteroscopy (N/A)    Final Anesthesia Type: general  Patient location during evaluation: PACU  Patient participation: Yes- Able to Participate  Level of consciousness: awake and alert and oriented  Post-procedure vital signs: reviewed and stable  Pain management: adequate  Airway patency: patent  PONV status at discharge: No PONV  Anesthetic complications: no      Cardiovascular status: stable  Respiratory status: unassisted, spontaneous ventilation and nasal cannula  Hydration status: euvolemic  Follow-up not needed.        Visit Vitals  /70 (BP Location: Left arm, Patient Position: Lying)   Pulse 82   Temp 36.5 °C (97.7 °F) (Oral)   Resp 15   Ht 5' 9" (1.753 m)   Wt 71 kg (156 lb 8.4 oz)   SpO2 100%   BMI 23.11 kg/m²       Pain/Gurpreet Score: Pain Assessment Performed: Yes (1/26/2018 12:05 PM)  Presence of Pain: denies (1/26/2018 12:05 PM)  Pain Rating Prior to Med Admin: 0 (1/25/2018 10:48 AM)  Gurpreet Score: 9 (1/25/2018 10:48 AM)      "

## 2018-01-26 NOTE — SUBJECTIVE & OBJECTIVE
Interval History: No complaints. Numerous BMs overnight with some bright red blood. INR 1.9 today, Will go for retro-DBE today with GI. Denies chest pain, SOB, NVD.    Continuous Infusions:  Scheduled Meds:   dronabinol  5 mg Oral TID AC    furosemide  40 mg Oral BID    lisinopril  5 mg Oral Daily    magnesium oxide  400 mg Oral BID    mupirocin  1 g Nasal BID    pantoprazole  80 mg Intravenous BID    pravastatin  20 mg Oral QHS    vancomycin 750 mg in normal saline 250 mL IVPB (ready to mix system)  750 mg Intravenous Q12H     PRN Meds:sodium chloride, sodium chloride 0.9%    Review of patient's allergies indicates:  No Known Allergies  Objective:     Vital Signs (Most Recent):  Temp: 98.9 °F (37.2 °C) (01/26/18 0722)  Pulse: 79 (01/26/18 0722)  Resp: 16 (01/26/18 0722)  BP: (!) 84/0 (01/26/18 0723)  SpO2: 99 % (01/26/18 0722) Vital Signs (24h Range):  Temp:  [97.7 °F (36.5 °C)-99.2 °F (37.3 °C)] 98.9 °F (37.2 °C)  Pulse:  [70-86] 79  Resp:  [12-18] 16  SpO2:  [95 %-100 %] 99 %  BP: ()/(0-73) 84/0  Arterial Line BP: (96)/(53) 96/53     Patient Vitals for the past 72 hrs (Last 3 readings):   Weight   01/26/18 0833 71 kg (156 lb 8.4 oz)   01/24/18 1031 72.2 kg (159 lb 2.8 oz)   01/24/18 0700 72.2 kg (159 lb 2.8 oz)     Body mass index is 23.11 kg/m².      Intake/Output Summary (Last 24 hours) at 01/26/18 1024  Last data filed at 01/26/18 0500   Gross per 24 hour   Intake             3570 ml   Output             1825 ml   Net             1745 ml     Hemodynamic Parameters:    Physical Exam   Constitutional: He is oriented to person, place, and time. He appears well-developed and well-nourished. No distress.   HENT:   Head: Normocephalic and atraumatic.   Eyes: Pupils are equal, round, and reactive to light. No scleral icterus.   Neck: Neck supple. No JVD present.   Cardiovascular:   + Smooth LVAD HUM    Pulmonary/Chest: Effort normal and breath sounds normal. He has no rales.   Abdominal: Soft. He  exhibits no distension and no mass. There is no tenderness. There is no rebound.   Musculoskeletal: He exhibits no edema.   Neurological: He is alert and oriented to person, place, and time.   Skin: Skin is warm and dry. Capillary refill takes 2 to 3 seconds.   Psychiatric: He has a normal mood and affect. His behavior is normal. Judgment and thought content normal.   Vitals reviewed.      Significant Labs:  CBC:    Recent Labs  Lab 01/24/18  1416 01/25/18  0543 01/26/18  0458   WBC 8.39 7.12 7.47   RBC 2.77* 2.64* 2.48*   HGB 7.8* 7.7* 7.2*   HCT 23.5* 23.0* 22.0*   * 116* 103*   MCV 85 87 89   MCH 28.2 29.2 29.0   MCHC 33.2 33.5 32.7     BNP:    Recent Labs  Lab 01/23/18  1344 01/24/18  0525 01/26/18  0458   * 617* 865*     CMP:    Recent Labs  Lab 01/23/18  1344 01/24/18  0525 01/25/18  0543 01/26/18  0458   * 84 73 85   CALCIUM 8.5* 8.2* 8.6* 8.5*   ALBUMIN 2.8* 2.2*  --  2.5*   PROT 5.5* 4.6*  --  5.2*    142 144 146*   K 3.3* 3.1* 3.3* 3.4*   CO2 24 27 27 28    109 110 113*   BUN 39* 34* 25* 15   CREATININE 0.9 0.8 0.7 0.8   ALKPHOS 55 49*  --  62   ALT 12 8*  --  24   AST 18 13  --  32   BILITOT 0.2 0.5  --  0.6      Coagulation:     Recent Labs  Lab 01/23/18  1344 01/24/18  0525 01/25/18  0543 01/26/18  0458   INR 3.7* 2.8* 2.4* 1.9*   APTT 35.2*  --   --   --      LDH:    Recent Labs  Lab 01/24/18  0525 01/25/18  0543 01/26/18  0458    143 145     Microbiology:  Microbiology Results (last 7 days)     ** No results found for the last 168 hours. **          I have reviewed all pertinent labs within the past 24 hours.    Estimated Creatinine Clearance: 89.6 mL/min (based on SCr of 0.8 mg/dL).    Diagnostic Results:  I have reviewed and interpreted all pertinent imaging results/findings within the past 24 hours.

## 2018-01-26 NOTE — PROGRESS NOTES
Bowel prep completed. Per pt and wife, BMs are now cleared. Held AM dose of marinnol per MD order.

## 2018-01-26 NOTE — PLAN OF CARE
Problem: Patient Care Overview  Goal: Plan of Care Review  Outcome: Ongoing (interventions implemented as appropriate)  Plan of care reviewed with pt. VS stable. Dopplers and VAD numbers WNL. No acute events at this time. Pt with black creamy stools that is becoming more loose with bowel prep. NPO after MN for scope in AM. Completed first half of bowel prep. IV Vanc administered as ordered, trough WNL. Bed low and locked, call light within reach. Pt remains free from falls or injury at this time. No c/o CP or SOB. Will continue to monitor. Ryanne Rubio RN

## 2018-01-26 NOTE — PROVATION PATIENT INSTRUCTIONS
Discharge Summary/Instructions after an Endoscopic Procedure  Patient Name: Suman Hayden  Patient MRN: [id number]  Patient YOB: 1950 Friday, January 26, 2018  Jesse Davis MD  RESTRICTIONS:  During your procedure today, you received medications for sedation.  These   medications may affect your judgment, balance and coordination.  Therefore,   for 24 hours, you have the following restrictions:   - DO NOT drive a car, operate machinery, make legal/financial decisions,   sign important papers or drink alcohol.    ACTIVITY:  The following day: return to full activity including work, except no heavy   lifting, straining or running for 3 days if polyps were removed.  DIET:  Eat and drink normally unless instructed otherwise.     TREATMENT FOR COMMON SIDE EFFECTS:  - Mild abdominal pain, belching, bloating or excessive gas: rest, eat   lightly and use a heating pad.  - Sore Throat: treat with throat lozenges and/or gargle with warm salt   water.  SYMPTOMS TO WATCH FOR AND REPORT TO YOUR PHYSICIAN:  1. Abdominal pain or bloating, other than gas cramps.  2. Chest pain.  3. Back pain.  4. Chills or fever occurring within 24 hours after the procedure.  5. Rectal bleeding, which would show as bright red, maroon, or black stools.   (A tablespoon of blood from the rectum is not serious, especially if   hemorrhoids are present.)  6. Vomiting.  7. Weakness or dizziness.  8. Because air was used during the procedure, expelling large amounts of air   from your rectum or belching is normal.  9. If a bowel prep was taken, you may not have a bowel movement for 1-3   days.  This is normal.  GO DIRECTLY TO THE NEAREST EMERGENCY ROOM IF YOU HAVE ANY OF THE FOLLOWING:      Difficulty breathing  Chills and/or fever over 101 F   Persistent vomiting and/or vomiting blood   Severe abdominal pain   Severe chest pain   Black, tarry stools   Bleeding- more than one tablespoon   Any other symptom or condition that you may feel needs  urgent attention  Your doctor recommends these additional instructions:  If any biopsies were taken, your doctor s clinic will contact you in 1 to 2   weeks with any results.  The findings and recommendations were discussed with your family.  For questions, problems or results please call your physician - Jesse Davis MD at Work:  [phone].  [facility], EMERGENCY ROOM PHONE NUMBER: [phone]  IF A COMPLICATION OR EMERGENCY SITUATION ARISES AND YOU ARE UNABLE TO REACH   YOUR PHYSICIAN - GO DIRECTLY TO THE EMERGENCY ROOM.  Jesse Davis MD  1/26/2018 2:50:30 PM  This report has been verified and signed electronically.

## 2018-01-26 NOTE — ASSESSMENT & PLAN NOTE
- HM3 implanted 7/2017 as DT  - Not on ASA at baseline.    - INR sub therapeutic, no heparin bridge. Resume low dose coumadin if OK with GI  - Intermittently pulsatile. MAP goal of 60-80  - Continue lisinopril 5mg daily  - No signs of acute volume overload, but has done well on lasix 40 mg BID at home

## 2018-01-26 NOTE — NURSING TRANSFER
Nursing Transfer Note      1/26/2018     Transfer To: 303    Transfer via stretcher    Transfer with cardiac monitoring    Transported by RN & PCT    Medicines sent: cristal IVPB    Chart send with patient: Yes    Notified: spouse    Patient reassessed at: 1/26/18 @ 8095    Pt transported with LVAD emergency bag on stretcher.

## 2018-01-26 NOTE — ASSESSMENT & PLAN NOTE
- Will continue outpatient regimen of Vancomycin (S. Epi bacteremia from [facility] source, end date estimation 2/19/18)  - Will obtain trough in the AM

## 2018-01-26 NOTE — TRANSFER OF CARE
"Anesthesia Transfer of Care Note    Patient: Suman Hayden    Procedure(s) Performed: Procedure(s) (LRB):  Retrograde deivce assisted enteroscopy (N/A)    Patient location: PACU    Anesthesia Type: general    Transport from OR: Transported from OR on 2-3 L/min O2 by NC with adequate spontaneous ventilation    Post pain: adequate analgesia    Post assessment: no apparent anesthetic complications    Post vital signs: stable    Level of consciousness: awake    Nausea/Vomiting: no nausea/vomiting    Complications: none    Transfer of care protocol was followed      Last vitals:   Visit Vitals  /70 (BP Location: Left arm, Patient Position: Lying)   Pulse 82   Temp 36.5 °C (97.7 °F) (Oral)   Resp 15   Ht 5' 9" (1.753 m)   Wt 71 kg (156 lb 8.4 oz)   SpO2 100%   BMI 23.11 kg/m²     "

## 2018-01-27 NOTE — ASSESSMENT & PLAN NOTE
- HM3 implanted 7/2017 as DT  - Not on ASA at baseline    - INR sub-therapeutic at 1.6 today, no heparin bridge; continue Coumadin at 7.5 mg Daily  - Intermittently pulsatile. MAP goal of 60-80  - Continue lisinopril 5mg daily  - No signs of acute volume overload, but has done well on lasix 40 mg BID at home

## 2018-01-27 NOTE — PROGRESS NOTES
[facility]  Heart Transplant  Progress Note    Patient Name: Suman Hayden  MRN: [id number]  Admission Date: 1/23/2018  Hospital Length of Stay: 4 days  Attending Physician: Graciela Bautista MD  Primary Care Provider: Joe Ernst MD  Principal Problem:GI bleed    Subjective:     Interval History: No events overnight. Retro-DBE yesterday was negative. Feeling well this AM.    Continuous Infusions:  Scheduled Meds:   dronabinol  5 mg Oral TID AC    lisinopril  5 mg Oral Daily    magnesium oxide  400 mg Oral BID    mupirocin  1 g Nasal BID    pantoprazole  80 mg Intravenous BID    pravastatin  20 mg Oral QHS    vancomycin 750 mg in normal saline 250 mL IVPB (ready to mix system)  750 mg Intravenous Q12H    warfarin  7.5 mg Oral Daily     PRN Meds:sodium chloride, sodium chloride 0.9%    Review of patient's allergies indicates:  No Known Allergies  Objective:     Vital Signs (Most Recent):  Temp: 97.4 °F (36.3 °C) (01/27/18 1137)  Pulse: 79 (01/27/18 1200)  Resp: 16 (01/27/18 1137)  BP: (!) 86/0 (01/27/18 1142)  SpO2: 99 % (01/27/18 1137) Vital Signs (24h Range):  Temp:  [97.4 °F (36.3 °C)-99.7 °F (37.6 °C)] 97.4 °F (36.3 °C)  Pulse:  [64-95] 79  Resp:  [11-18] 16  SpO2:  [95 %-100 %] 99 %  BP: ()/(0-78) 86/0     Patient Vitals for the past 72 hrs (Last 3 readings):   Weight   01/27/18 0800 70.6 kg (155 lb 10.3 oz)   01/26/18 0833 71 kg (156 lb 8.4 oz)     Body mass index is 22.98 kg/m².      Intake/Output Summary (Last 24 hours) at 01/27/18 1213  Last data filed at 01/27/18 0500   Gross per 24 hour   Intake              950 ml   Output             1250 ml   Net             -300 ml     Hemodynamic Parameters:    Physical Exam   Constitutional: He is oriented to person, place, and time. He appears well-developed and well-nourished. No distress.   HENT:   Head: Normocephalic and atraumatic.   Eyes: Pupils are equal, round, and reactive to light. No scleral icterus.   Neck: Neck  supple. No JVD present.   Cardiovascular:   + Smooth LVAD HUM    Pulmonary/Chest: Effort normal and breath sounds normal. He has no rales.   Abdominal: Soft. He exhibits no distension and no mass. There is no tenderness. There is no rebound.   Musculoskeletal: He exhibits no edema.   Neurological: He is alert and oriented to person, place, and time.   Skin: Skin is warm and dry. Capillary refill takes 2 to 3 seconds.   Psychiatric: He has a normal mood and affect. His behavior is normal. Judgment and thought content normal.   Vitals reviewed.    Significant Labs:  CBC:    Recent Labs  Lab 01/25/18  0543 01/26/18 0458 01/27/18  0534   WBC 7.12 7.47 5.63   RBC 2.64* 2.48* 2.57*   HGB 7.7* 7.2* 7.3*   HCT 23.0* 22.0* 23.3*   * 103* 107*   MCV 87 89 91   MCH 29.2 29.0 28.4   MCHC 33.5 32.7 31.3*     BNP:    Recent Labs  Lab 01/23/18  1344 01/24/18  0525 01/26/18  0458   * 617* 865*     CMP:    Recent Labs  Lab 01/23/18  1344 01/24/18  0525 01/25/18  0543 01/26/18 0458 01/27/18  0534   * 84 73 85 79   CALCIUM 8.5* 8.2* 8.6* 8.5* 8.4*   ALBUMIN 2.8* 2.2*  --  2.5*  --    PROT 5.5* 4.6*  --  5.2*  --     142 144 146* 146*   K 3.3* 3.1* 3.3* 3.4* 3.6   CO2 24 27 27 28 26    109 110 113* 114*   BUN 39* 34* 25* 15 11   CREATININE 0.9 0.8 0.7 0.8 0.8   ALKPHOS 55 49*  --  62  --    ALT 12 8*  --  24  --    AST 18 13  --  32  --    BILITOT 0.2 0.5  --  0.6  --       Coagulation:     Recent Labs  Lab 01/23/18  1344  01/25/18  0543 01/26/18  0458 01/27/18  0534   INR 3.7*  < > 2.4* 1.9* 1.6*   APTT 35.2*  --   --   --   --    < > = values in this interval not displayed.  LDH:    Recent Labs  Lab 01/25/18  0543 01/26/18  0458 01/27/18  0534    145 144     Microbiology:  Microbiology Results (last 7 days)     ** No results found for the last 168 hours. **        I have reviewed all pertinent labs within the past 24 hours.    Estimated Creatinine Clearance: 89.5 mL/min (based on SCr of 0.8  mg/dL).    Diagnostic Results:  I have reviewed and interpreted all pertinent imaging results/findings within the past 24 hours.    Assessment and Plan:     * GI bleed    - HGB was slowly down trending, though stable today at 7.3 from 7.2 yesterday  - DBE on 1/25 with no active bleeds but noted ulcers and polyps; retrograde DBE on 1/26 reported as negative  - Continue pantoprazole 80mg IV BID   - INR 1.6 today, has been restarted on Coumadin as below  - Not on ASA at baseline as below (held since December)        LVAD (left ventricular assist device) present    - HM3 implanted 7/2017 as DT  - Not on ASA at baseline    - INR sub-therapeutic at 1.6 today, no heparin bridge; continue Coumadin at 7.5 mg Daily  - Intermittently pulsatile. MAP goal of 60-80  - Continue lisinopril 5mg daily  - No signs of acute volume overload, but has done well on lasix 40 mg BID at home        Bacteremia    - Will continue outpatient regimen of Vancomycin (S. Epi bacteremia from [facility] source, end date estimation 2/19/18)  - Vanc trough this AM at 15, continue to follow        Patient seen and examined this morning. Discussed with Dr. Bautista - staff attestation to follow.    Robert Tee MD  Heart Transplant  [facility]

## 2018-01-27 NOTE — SUBJECTIVE & OBJECTIVE
Interval History: No events overnight. Retro-DBE yesterday was negative. Feeling well this AM.    Continuous Infusions:  Scheduled Meds:   dronabinol  5 mg Oral TID AC    lisinopril  5 mg Oral Daily    magnesium oxide  400 mg Oral BID    mupirocin  1 g Nasal BID    pantoprazole  80 mg Intravenous BID    pravastatin  20 mg Oral QHS    vancomycin 750 mg in normal saline 250 mL IVPB (ready to mix system)  750 mg Intravenous Q12H    warfarin  7.5 mg Oral Daily     PRN Meds:sodium chloride, sodium chloride 0.9%    Review of patient's allergies indicates:  No Known Allergies  Objective:     Vital Signs (Most Recent):  Temp: 97.4 °F (36.3 °C) (01/27/18 1137)  Pulse: 79 (01/27/18 1200)  Resp: 16 (01/27/18 1137)  BP: (!) 86/0 (01/27/18 1142)  SpO2: 99 % (01/27/18 1137) Vital Signs (24h Range):  Temp:  [97.4 °F (36.3 °C)-99.7 °F (37.6 °C)] 97.4 °F (36.3 °C)  Pulse:  [64-95] 79  Resp:  [11-18] 16  SpO2:  [95 %-100 %] 99 %  BP: ()/(0-78) 86/0     Patient Vitals for the past 72 hrs (Last 3 readings):   Weight   01/27/18 0800 70.6 kg (155 lb 10.3 oz)   01/26/18 0833 71 kg (156 lb 8.4 oz)     Body mass index is 22.98 kg/m².      Intake/Output Summary (Last 24 hours) at 01/27/18 1213  Last data filed at 01/27/18 0500   Gross per 24 hour   Intake              950 ml   Output             1250 ml   Net             -300 ml     Hemodynamic Parameters:    Physical Exam   Constitutional: He is oriented to person, place, and time. He appears well-developed and well-nourished. No distress.   HENT:   Head: Normocephalic and atraumatic.   Eyes: Pupils are equal, round, and reactive to light. No scleral icterus.   Neck: Neck supple. No JVD present.   Cardiovascular:   + Smooth LVAD HUM    Pulmonary/Chest: Effort normal and breath sounds normal. He has no rales.   Abdominal: Soft. He exhibits no distension and no mass. There is no tenderness. There is no rebound.   Musculoskeletal: He exhibits no edema.   Neurological: He is alert  and oriented to person, place, and time.   Skin: Skin is warm and dry. Capillary refill takes 2 to 3 seconds.   Psychiatric: He has a normal mood and affect. His behavior is normal. Judgment and thought content normal.   Vitals reviewed.    Significant Labs:  CBC:    Recent Labs  Lab 01/25/18  0543 01/26/18 0458 01/27/18  0534   WBC 7.12 7.47 5.63   RBC 2.64* 2.48* 2.57*   HGB 7.7* 7.2* 7.3*   HCT 23.0* 22.0* 23.3*   * 103* 107*   MCV 87 89 91   MCH 29.2 29.0 28.4   MCHC 33.5 32.7 31.3*     BNP:    Recent Labs  Lab 01/23/18  1344 01/24/18  0525 01/26/18 0458   * 617* 865*     CMP:    Recent Labs  Lab 01/23/18  1344 01/24/18  0525 01/25/18  0543 01/26/18 0458 01/27/18  0534   * 84 73 85 79   CALCIUM 8.5* 8.2* 8.6* 8.5* 8.4*   ALBUMIN 2.8* 2.2*  --  2.5*  --    PROT 5.5* 4.6*  --  5.2*  --     142 144 146* 146*   K 3.3* 3.1* 3.3* 3.4* 3.6   CO2 24 27 27 28 26    109 110 113* 114*   BUN 39* 34* 25* 15 11   CREATININE 0.9 0.8 0.7 0.8 0.8   ALKPHOS 55 49*  --  62  --    ALT 12 8*  --  24  --    AST 18 13  --  32  --    BILITOT 0.2 0.5  --  0.6  --       Coagulation:     Recent Labs  Lab 01/23/18  1344  01/25/18  0543 01/26/18 0458 01/27/18  0534   INR 3.7*  < > 2.4* 1.9* 1.6*   APTT 35.2*  --   --   --   --    < > = values in this interval not displayed.  LDH:    Recent Labs  Lab 01/25/18  0543 01/26/18 0458 01/27/18  0534    145 144     Microbiology:  Microbiology Results (last 7 days)     ** No results found for the last 168 hours. **        I have reviewed all pertinent labs within the past 24 hours.    Estimated Creatinine Clearance: 89.5 mL/min (based on SCr of 0.8 mg/dL).    Diagnostic Results:  I have reviewed and interpreted all pertinent imaging results/findings within the past 24 hours.

## 2018-01-27 NOTE — PLAN OF CARE
Problem: Patient Care Overview  Goal: Plan of Care Review  Plan of care discussed with patient. Fall precautions in place. Patient has no complaints of pain. Discussed medications and care. VAD numbers and dopplers WNL. IV abx continued. Patient has no questions at this time.White board updated. Bed locked in lowest position. Side rails up x2. Will continue to monitor.

## 2018-01-27 NOTE — ASSESSMENT & PLAN NOTE
- HGB was slowly down trending, though stable today at 7.3 from 7.2 yesterday  - DBE on 1/25 with no active bleeds but noted ulcers and polyps; retrograde DBE on 1/26 reported as negative  - Continue pantoprazole 80mg IV BID   - INR 1.6 today, has been restarted on Coumadin as below  - Not on ASA at baseline as below (held since December)

## 2018-01-27 NOTE — ASSESSMENT & PLAN NOTE
- Will continue outpatient regimen of Vancomycin (S. Epi bacteremia from [facility] source, end date estimation 2/19/18)  - Vanc trough this AM at 15, continue to follow

## 2018-01-28 NOTE — PLAN OF CARE
Problem: Patient Care Overview  Goal: Plan of Care Review  Plan of care discussed with patient.  Fall precautions in place. Patient has no complaints of pain. Discussed medications and care. VAD numbers and dopplers WNL. Patient has no questions at this time.White board updated. Bed locked in lowest position. Side rails up x2. Will continue to monitor.

## 2018-01-28 NOTE — SUBJECTIVE & OBJECTIVE
Interval History: No events overnight. HGB stable this AM, INR now 2.0. Feeling well this AM.    Continuous Infusions:  Scheduled Meds:   dronabinol  5 mg Oral TID AC    lisinopril  5 mg Oral Daily    magnesium oxide  400 mg Oral BID    pantoprazole  80 mg Intravenous BID    pravastatin  20 mg Oral QHS    vancomycin 750 mg in normal saline 250 mL IVPB (ready to mix system)  750 mg Intravenous Q12H    warfarin  5 mg Oral Daily     PRN Meds:sodium chloride, sodium chloride 0.9%    Review of patient's allergies indicates:  No Known Allergies  Objective:     Vital Signs (Most Recent):  Temp: 98.8 °F (37.1 °C) (01/28/18 0715)  Pulse: 79 (01/28/18 1117)  Resp: 18 (01/28/18 1117)  BP: (!) 84/0 (01/28/18 1117)  SpO2: 99 % (01/28/18 1117) Vital Signs (24h Range):  Temp:  [97.4 °F (36.3 °C)-98.8 °F (37.1 °C)] 98.8 °F (37.1 °C)  Pulse:  [60-81] 79  Resp:  [16-20] 18  SpO2:  [97 %-99 %] 99 %  BP: ()/(0-75) 84/0     Patient Vitals for the past 72 hrs (Last 3 readings):   Weight   01/28/18 0715 70.6 kg (155 lb 10.3 oz)   01/27/18 0800 70.6 kg (155 lb 10.3 oz)   01/26/18 0833 71 kg (156 lb 8.4 oz)     Body mass index is 22.98 kg/m².      Intake/Output Summary (Last 24 hours) at 01/28/18 1122  Last data filed at 01/28/18 0523   Gross per 24 hour   Intake              910 ml   Output              475 ml   Net              435 ml     Hemodynamic Parameters:    Physical Exam   Constitutional: He is oriented to person, place, and time. He appears well-developed and well-nourished. No distress.   HENT:   Head: Normocephalic and atraumatic.   Eyes: Pupils are equal, round, and reactive to light. No scleral icterus.   Neck: Neck supple. No JVD present.   Cardiovascular:   + Smooth LVAD HUM    Pulmonary/Chest: Effort normal and breath sounds normal. He has no rales.   Abdominal: Soft. He exhibits no distension and no mass. There is no tenderness. There is no rebound.   Musculoskeletal: He exhibits no edema.   Neurological: He  is alert and oriented to person, place, and time.   Skin: Skin is warm and dry. Capillary refill takes 2 to 3 seconds.   Psychiatric: He has a normal mood and affect. His behavior is normal. Judgment and thought content normal.   Vitals reviewed.    Significant Labs:  CBC:    Recent Labs  Lab 01/26/18 0458 01/27/18 0534 01/28/18 0618   WBC 7.47 5.63 6.07   RBC 2.48* 2.57* 2.62*   HGB 7.2* 7.3* 7.4*   HCT 22.0* 23.3* 23.6*   * 107* 110*   MCV 89 91 90   MCH 29.0 28.4 28.2   MCHC 32.7 31.3* 31.4*     BNP:    Recent Labs  Lab 01/23/18 1344 01/24/18 0525 01/26/18 0458   * 617* 865*     CMP:    Recent Labs  Lab 01/23/18  1344 01/24/18 0525 01/26/18 0458 01/27/18 0534 01/28/18 0618   * 84  < > 85 79 78   CALCIUM 8.5* 8.2*  < > 8.5* 8.4* 8.7   ALBUMIN 2.8* 2.2*  --  2.5*  --   --    PROT 5.5* 4.6*  --  5.2*  --   --     142  < > 146* 146* 144   K 3.3* 3.1*  < > 3.4* 3.6 4.2   CO2 24 27  < > 28 26 25    109  < > 113* 114* 112*   BUN 39* 34*  < > 15 11 11   CREATININE 0.9 0.8  < > 0.8 0.8 0.8   ALKPHOS 55 49*  --  62  --   --    ALT 12 8*  --  24  --   --    AST 18 13  --  32  --   --    BILITOT 0.2 0.5  --  0.6  --   --    < > = values in this interval not displayed.   Coagulation:     Recent Labs  Lab 01/23/18  1344  01/26/18 0458 01/27/18 0534 01/28/18 0618   INR 3.7*  < > 1.9* 1.6* 2.0*   APTT 35.2*  --   --   --   --    < > = values in this interval not displayed.  LDH:    Recent Labs  Lab 01/26/18  0458 01/27/18  0534 01/28/18  0618    144 143     Microbiology:  Microbiology Results (last 7 days)     ** No results found for the last 168 hours. **        I have reviewed all pertinent labs within the past 24 hours.    Estimated Creatinine Clearance: 89.5 mL/min (based on SCr of 0.8 mg/dL).    Diagnostic Results:  I have reviewed and interpreted all pertinent imaging results/findings within the past 24 hours.

## 2018-01-28 NOTE — PLAN OF CARE
Problem: Patient Care Overview  Goal: Plan of Care Review  Outcome: Revised  Plan of care discussed with patient. Patient is free of fall/trauma/injury. Denies CP, SOB, or pain/discomfort. H&H stable 7.4/23.6.  No signs of bleeding.  All questions addressed. Will continue to monitor

## 2018-01-28 NOTE — ASSESSMENT & PLAN NOTE
- Will continue outpatient regimen of Vancomycin (S. Epi bacteremia from [facility] source, end date estimation 2/19/18)  - Vanc trough yesterday AM at 15, continue to follow

## 2018-01-28 NOTE — ASSESSMENT & PLAN NOTE
- HM3 implanted 7/2017 as DT  - Not on ASA at baseline    - INR now at 2.0 today from 1.6 yesterday, no heparin bridge; will decrease Coumadin to 5 mg Daily (home dose)  - Intermittently pulsatile. MAP goal of 60-80  - Continue lisinopril 5mg daily  - No signs of acute volume overload, but has done well on lasix 40 mg BID at home

## 2018-01-28 NOTE — TREATMENT PLAN
"Treatment Plan  01/27/2018  10:54 PM    Visited with patient and wife at length today.  He feels "great" with no further melena or BRBPR.  Will check up on patient in the AM and if stable will provide final recs from a GI bleed perspective.    Dale Puga M.D.  Gastroenterology Fellow, PGY-IV  Pager: [phone]  [facility]    "

## 2018-01-28 NOTE — PROGRESS NOTES
[facility]  Heart Transplant  Progress Note    Patient Name: Suman Hayden  MRN: [id number]  Admission Date: 1/23/2018  Hospital Length of Stay: 5 days  Attending Physician: Graciela Bautista MD  Primary Care Provider: Joe Ernst MD  Principal Problem:GI bleed    Subjective:     Interval History: No events overnight. HGB stable this AM, INR now 2.0. Feeling well this AM.    Continuous Infusions:  Scheduled Meds:   dronabinol  5 mg Oral TID AC    lisinopril  5 mg Oral Daily    magnesium oxide  400 mg Oral BID    pantoprazole  80 mg Intravenous BID    pravastatin  20 mg Oral QHS    vancomycin 750 mg in normal saline 250 mL IVPB (ready to mix system)  750 mg Intravenous Q12H    warfarin  5 mg Oral Daily     PRN Meds:sodium chloride, sodium chloride 0.9%    Review of patient's allergies indicates:  No Known Allergies  Objective:     Vital Signs (Most Recent):  Temp: 98.8 °F (37.1 °C) (01/28/18 0715)  Pulse: 79 (01/28/18 1117)  Resp: 18 (01/28/18 1117)  BP: (!) 84/0 (01/28/18 1117)  SpO2: 99 % (01/28/18 1117) Vital Signs (24h Range):  Temp:  [97.4 °F (36.3 °C)-98.8 °F (37.1 °C)] 98.8 °F (37.1 °C)  Pulse:  [60-81] 79  Resp:  [16-20] 18  SpO2:  [97 %-99 %] 99 %  BP: ()/(0-75) 84/0     Patient Vitals for the past 72 hrs (Last 3 readings):   Weight   01/28/18 0715 70.6 kg (155 lb 10.3 oz)   01/27/18 0800 70.6 kg (155 lb 10.3 oz)   01/26/18 0833 71 kg (156 lb 8.4 oz)     Body mass index is 22.98 kg/m².      Intake/Output Summary (Last 24 hours) at 01/28/18 1122  Last data filed at 01/28/18 0523   Gross per 24 hour   Intake              910 ml   Output              475 ml   Net              435 ml     Hemodynamic Parameters:    Physical Exam   Constitutional: He is oriented to person, place, and time. He appears well-developed and well-nourished. No distress.   HENT:   Head: Normocephalic and atraumatic.   Eyes: Pupils are equal, round, and reactive to light. No scleral icterus.   Neck:  Neck supple. No JVD present.   Cardiovascular:   + Smooth LVAD HUM    Pulmonary/Chest: Effort normal and breath sounds normal. He has no rales.   Abdominal: Soft. He exhibits no distension and no mass. There is no tenderness. There is no rebound.   Musculoskeletal: He exhibits no edema.   Neurological: He is alert and oriented to person, place, and time.   Skin: Skin is warm and dry. Capillary refill takes 2 to 3 seconds.   Psychiatric: He has a normal mood and affect. His behavior is normal. Judgment and thought content normal.   Vitals reviewed.    Significant Labs:  CBC:    Recent Labs  Lab 01/26/18  0458 01/27/18  0534 01/28/18  0618   WBC 7.47 5.63 6.07   RBC 2.48* 2.57* 2.62*   HGB 7.2* 7.3* 7.4*   HCT 22.0* 23.3* 23.6*   * 107* 110*   MCV 89 91 90   MCH 29.0 28.4 28.2   MCHC 32.7 31.3* 31.4*     BNP:    Recent Labs  Lab 01/23/18  1344 01/24/18  0525 01/26/18  0458   * 617* 865*     CMP:    Recent Labs  Lab 01/23/18  1344 01/24/18  0525  01/26/18  0458 01/27/18  0534 01/28/18  0618   * 84  < > 85 79 78   CALCIUM 8.5* 8.2*  < > 8.5* 8.4* 8.7   ALBUMIN 2.8* 2.2*  --  2.5*  --   --    PROT 5.5* 4.6*  --  5.2*  --   --     142  < > 146* 146* 144   K 3.3* 3.1*  < > 3.4* 3.6 4.2   CO2 24 27  < > 28 26 25    109  < > 113* 114* 112*   BUN 39* 34*  < > 15 11 11   CREATININE 0.9 0.8  < > 0.8 0.8 0.8   ALKPHOS 55 49*  --  62  --   --    ALT 12 8*  --  24  --   --    AST 18 13  --  32  --   --    BILITOT 0.2 0.5  --  0.6  --   --    < > = values in this interval not displayed.   Coagulation:     Recent Labs  Lab 01/23/18  1344  01/26/18  0458 01/27/18  0534 01/28/18  0618   INR 3.7*  < > 1.9* 1.6* 2.0*   APTT 35.2*  --   --   --   --    < > = values in this interval not displayed.  LDH:    Recent Labs  Lab 01/26/18  0458 01/27/18  0534 01/28/18  0618    144 143     Microbiology:  Microbiology Results (last 7 days)     ** No results found for the last 168 hours. **        I have  reviewed all pertinent labs within the past 24 hours.    Estimated Creatinine Clearance: 89.5 mL/min (based on SCr of 0.8 mg/dL).    Diagnostic Results:  I have reviewed and interpreted all pertinent imaging results/findings within the past 24 hours.    Assessment and Plan:     * GI bleed    - HGB was slowly down trending, though stable today at 7.4 from 7.3 yesterday  - DBE on 1/25 with no active bleeds but noted ulcers and polyps; retrograde DBE on 1/26 reported as negative  - Continue pantoprazole 80mg IV BID   - INR 2.0 today, has been restarted on Coumadin as below  - Not on ASA at baseline as below (held since December)  - Awaiting final GI rec's today        LVAD (left ventricular assist device) present    - HM3 implanted 7/2017 as DT  - Not on ASA at baseline    - INR now at 2.0 today from 1.6 yesterday, no heparin bridge; will decrease Coumadin to 5 mg Daily (home dose)  - Intermittently pulsatile. MAP goal of 60-80  - Continue lisinopril 5mg daily  - No signs of acute volume overload, but has done well on lasix 40 mg BID at home        Bacteremia    - Will continue outpatient regimen of Vancomycin (S. Epi bacteremia from [facility], end date estimation 2/19/18)  - Vanc trough yesterday AM at 15, continue to follow        Patient seen and examined this morning. Discussed with Dr. Bautista - staff attestation to follow.    Robert Tee MD  Heart Transplant  [facility]

## 2018-01-28 NOTE — ASSESSMENT & PLAN NOTE
- HGB was slowly down trending, though stable today at 7.4 from 7.3 yesterday  - DBE on 1/25 with no active bleeds but noted ulcers and polyps; retrograde DBE on 1/26 reported as negative  - Continue pantoprazole 80mg IV BID   - INR 2.0 today, has been restarted on Coumadin as below  - Not on ASA at baseline as below (held since December)  - Awaiting final GI rec's today

## 2018-01-29 NOTE — TREATMENT PLAN
Treatment Plan  01/29/2018  12:00 AM      Patient seen on Saturday and Sunday. Moving forward our recs are as follow:  --If tomorrow morning patient Hgb is stable, he requires no further blood (makes 48 hours without prodcuts), and BM are not melenotic he can go home with the following instructions:   --Stay off NSAIDs   --Continue PPI   --Tight control on INR with close follow up of CBC as well to make  sure hgb is holding    --If tomorrow morning hgb is trending down and he required blood products or has melena we will proceed with a repeat EGD to remove gastric inflammatory polyps.      Dale Puga M.D.  Gastroenterology Fellow, PGY-IV  Pager: [phone]  [facility]

## 2018-01-29 NOTE — ASSESSMENT & PLAN NOTE
- HGB relatively stable with no melena or BRBPR  - DBE on 1/25 with no active bleeds but noted ulcers and polyps; retrograde DBE on 1/26 reported as negative  - Continue pantoprazole 80mg IV BID   - INR 2.6 today, has been restarted on Coumadin  - Not on ASA at baseline as below (held since December)  - Appreciate final recs from GI. (off NSAIDS, BID PPI, strict INR monitoring, follow CBC closely)

## 2018-01-29 NOTE — ASSESSMENT & PLAN NOTE
- HM3 implanted 7/2017 as DT  - Not on ASA at baseline    - INR now at 2.6 today from 2.0 yesterday, no heparin bridge; will continueCoumadin 5 mg Daily (home dose)  - Intermittently pulsatile. MAP goal of 60-80  - Continue lisinopril 5mg daily  - No signs of acute volume overload, but has done well on lasix 40 mg BID at home. Will plan to send patient home on 1/2 dose (20 BID) as he is euvolemic on exam and have patient weigh himself when he gets home. CBC/BMP to follow patient closely with home health.

## 2018-01-29 NOTE — PLAN OF CARE
[facility]              Heart Transplant/VAD Clinic   [address] [place] [address]              [phone] after hours          [phone] fax                 VAD HOME  HEALTH ORDERS     Admit to Home Health     Diagnosis:       Patient Active Problem List   Diagnosis    Nonischemic cardiomyopathy    Encounter for monitoring amiodarone therapy    Essential hypertension    V-tach    Elevated PSA    Hepatitis B core antibody positive since 2012    Chronic systolic congestive heart failure    Heart transplant candidate    Hyperbilirubinemia    Malfunction of implantable cardioverter-defibrillator (ICD) electrode    Syncope and collapse    Atrial tachycardia    Hyperglycemia    AICD (automatic cardioverter/defibrillator) present    LVAD (left ventricular assist device) present    Atrial fibrillation    Heart replaced by heart assist device    Bacteremia    Anticoagulation monitoring, INR range 2-3    Defibrillator discharge    ICD (implantable cardioverter-defibrillator) pocket hematoma    Subtherapeutic international normalized ratio (INR)    Constipation    Normocytic anemia    Anemia    Ileum ulcer    Staphylococcus epidermidis bacteremia         Patient is homebound due to:  LVAD, IV abx     Diet: Low Fat, Low cholesterol, 2Gm Na, Coumadin restrictions.     Acitivities: No Swimming, bathing, vacuuming, contact sports.     Fresh implants= Sternal Precautions     Nursing:   SN to complete comprehensive assessment including routine vital signs. Instruct on disease process and s/s of complications to report to MD. Review/verify medication list sent home with the patient at time of discharge  and instruct patient/caregiver as needed. Frequency may be adjusted depending on start of care date.     **LVAD driveline exit site dressing change is to be completed per LVAD patient/caregiver only**.     Notify MD if SBP > 160 or < 90; DBP > 90  or < 50; HR > 120 or < 50; Temp > 101; Weight gain >3lbs in 1 day or 5lbs in 1 week.     LABS:  SN to perform labs: PT/INR per Coumadin clinic [phone].   Follow up INR date: 2/1/18  No Finger Sticks  weekly CBC, CMP, ESR, CRP, and vanc trough faxed to ID clinic at [phone]     HOME INFUSION THERAPY:    SN to perform Infusion Therapy/Central Line Care.  Review Central Line Care & Central Line Flush with patient.     Administer (drug and dose): Vancomycin 750 mg IV q 12 hours. Administer over 90 minutes (estimated end date: 2/19/18)     Central line care:  Scrub the Hub: Prior to accessing the line, always perform a 30 second alcohol scrub  Each lumen of the central line is to be flushed at least daily with 10 mL Normal Saline and 3 mL Heparin flush (100 units/mL)  Skilled Nurse (SN) may draw blood from IV access  Blood Draw Procedure:              - Aspirate at least 5 mL of blood              - Discard              - Obtain specimen              - Change posiflow cap              - Flush with 20 mL Normal Saline followed by a                 3-5 mL Heparin flush (100 units/mL)  Central :              - Sterile dressing changes are done weekly and as needed.              - Use chlor-hexadine scrub to cleanse site, apply Biopatch to insertion site,                 apply securement device dressing              - Posi-flow caps are changed weekly and after EVERY lab draw.              - If sterile gauze is under dressing to control oozing,                 dressing change must be performed every 24 hours until gauze is not needed.     Send initial Home Health orders to [facility] attending physician on call.  Send follow up questions to VAD clinic MD [phone] or [phone].

## 2018-01-29 NOTE — PROCEDURES
Patient AAO with family at bedside. VAD interrogation completed this AM in the event changes needed to be made. Will continue to monitor for further issues.     Pulsatile: Yes, intermittent   VAD Sounds: HM3  Smooth  Problems / Issues / Alarms with VAD if any: None noted  HCT: 23      VAD Interrogation:  TXP LVAD INTERROGATIONS 1/29/2018 1/29/2018 1/29/2018 1/29/2018 1/29/2018 1/28/2018 1/28/2018   Type - (No Data) - HeartMate3 HeartMate3 HeartMate3 HeartMate3   Flow 4.1 - 3.8 3.9 4.1 4.2 4.1   Speed 5200 - 5200 5200 5200 5200 5200   PI 3.7 - 4.3 4.0 3.7 3.7 3.7   Power ([place]) 3.5 - 3.6 3.5 3.5 3.5 3.5   LSL - - - - - - -   Pulsatility - - Pulse - - - -   }

## 2018-01-29 NOTE — ASSESSMENT & PLAN NOTE
- Will continue outpatient regimen of Vancomycin (S. Epi bacteremia from [facility] source, end date estimation 2/19/18)  - Vanc trough 1/27 AM at 15, continue to follow

## 2018-01-29 NOTE — SUBJECTIVE & OBJECTIVE
Interval History: No events overnight. HGB stable this AM, INR now 2.6. Plan to D/C tomorrow if INR <2.8 and H & H is stable. Denies chest pain, SOB, NVD.    Continuous Infusions:  Scheduled Meds:   dronabinol  5 mg Oral TID AC    lisinopril  5 mg Oral Daily    magnesium oxide  400 mg Oral BID    pantoprazole  80 mg Intravenous BID    pravastatin  20 mg Oral QHS    vancomycin 750 mg in normal saline 250 mL IVPB (ready to mix system)  750 mg Intravenous Q12H    warfarin  5 mg Oral Daily     PRN Meds:sodium chloride, sodium chloride 0.9%    Review of patient's allergies indicates:  No Known Allergies  Objective:     Vital Signs (Most Recent):  Temp: 98.5 °F (36.9 °C) (01/29/18 1100)  Pulse: 72 (01/29/18 1119)  Resp: 18 (01/29/18 1100)  BP: (!) 80/0 (01/29/18 1100)  SpO2: 95 % (01/29/18 1100) Vital Signs (24h Range):  Temp:  [98.4 °F (36.9 °C)-99.2 °F (37.3 °C)] 98.5 °F (36.9 °C)  Pulse:  [64-80] 72  Resp:  [16-18] 18  SpO2:  [95 %-99 %] 95 %  BP: ()/(0-86) 80/0     Patient Vitals for the past 72 hrs (Last 3 readings):   Weight   01/29/18 0800 74.8 kg (164 lb 14.5 oz)   01/28/18 0715 70.6 kg (155 lb 10.3 oz)   01/27/18 0800 70.6 kg (155 lb 10.3 oz)     Body mass index is 24.35 kg/m².      Intake/Output Summary (Last 24 hours) at 01/29/18 1207  Last data filed at 01/29/18 1109   Gross per 24 hour   Intake             1940 ml   Output             1375 ml   Net              565 ml     Hemodynamic Parameters:    Physical Exam   Constitutional: He is oriented to person, place, and time. He appears well-developed and well-nourished. No distress.   HENT:   Head: Normocephalic and atraumatic.   Eyes: Pupils are equal, round, and reactive to light. No scleral icterus.   Neck: Neck supple. No JVD present.   Cardiovascular:   + Smooth LVAD HUM    Pulmonary/Chest: Effort normal and breath sounds normal. He has no rales.   Abdominal: Soft. He exhibits no distension and no mass. There is no tenderness. There is no  rebound.   Musculoskeletal: He exhibits no edema.   Neurological: He is alert and oriented to person, place, and time.   Skin: Skin is warm and dry. Capillary refill takes 2 to 3 seconds.   Psychiatric: He has a normal mood and affect. His behavior is normal. Judgment and thought content normal.   Vitals reviewed.    Significant Labs:  CBC:    Recent Labs  Lab 01/27/18  0534 01/28/18 0618 01/29/18  0454   WBC 5.63 6.07 5.89   RBC 2.57* 2.62* 2.58*   HGB 7.3* 7.4* 7.2*   HCT 23.3* 23.6* 23.1*   * 110* 115*   MCV 91 90 90   MCH 28.4 28.2 27.9   MCHC 31.3* 31.4* 31.2*     BNP:    Recent Labs  Lab 01/24/18  0525 01/26/18 0458 01/29/18  0454   * 865* 917*     CMP:    Recent Labs  Lab 01/24/18  0525  01/26/18  0458 01/27/18  0534 01/28/18 0618 01/29/18  0454   GLU 84  < > 85 79 78 75   CALCIUM 8.2*  < > 8.5* 8.4* 8.7 8.2*   ALBUMIN 2.2*  --  2.5*  --   --  2.3*   PROT 4.6*  --  5.2*  --   --  5.0*     < > 146* 146* 144 140   K 3.1*  < > 3.4* 3.6 4.2 3.9   CO2 27  < > 28 26 25 25     < > 113* 114* 112* 110   BUN 34*  < > 15 11 11 13   CREATININE 0.8  < > 0.8 0.8 0.8 0.8   ALKPHOS 49*  --  62  --   --  62   ALT 8*  --  24  --   --  16   AST 13  --  32  --   --  19   BILITOT 0.5  --  0.6  --   --  0.4   < > = values in this interval not displayed.   Coagulation:     Recent Labs  Lab 01/23/18  1344  01/27/18  0534 01/28/18 0618 01/29/18  0454   INR 3.7*  < > 1.6* 2.0* 2.6*   APTT 35.2*  --   --   --   --    < > = values in this interval not displayed.  LDH:    Recent Labs  Lab 01/27/18  0534 01/28/18  0618 01/29/18  0454    143 153     Microbiology:  Microbiology Results (last 7 days)     ** No results found for the last 168 hours. **        I have reviewed all pertinent labs within the past 24 hours.    Estimated Creatinine Clearance: 89.6 mL/min (based on SCr of 0.8 mg/dL).    Diagnostic Results:  I have reviewed and interpreted all pertinent imaging results/findings within the past 24  hours.

## 2018-01-29 NOTE — PLAN OF CARE
Problem: Patient Care Overview  Goal: Plan of Care Review  Outcome: Revised  Plan of care discussed with patient.  Patient ambulating with can, fall precautions in place. LVAD DP and numbers WNL, smooth LVAD hum. Patient has no complaints of pain. Discussed medications and care. Patient has no questions at this time. Will continue to monitor.

## 2018-01-29 NOTE — PROGRESS NOTES
[facility]  Heart Transplant  Progress Note    Patient Name: Suman Hayden  MRN: [id number]  Admission Date: 1/23/2018  Hospital Length of Stay: 6 days  Attending Physician: Graciela Bautista MD  Primary Care Provider: Joe Ernst MD  Principal Problem:GI bleed    Subjective:     Interval History: No events overnight. HGB stable this AM, INR now 2.6. Plan to D/C tomorrow if INR <2.8 and H & H is stable. Denies chest pain, SOB, NVD.    Continuous Infusions:  Scheduled Meds:   dronabinol  5 mg Oral TID AC    lisinopril  5 mg Oral Daily    magnesium oxide  400 mg Oral BID    pantoprazole  80 mg Intravenous BID    pravastatin  20 mg Oral QHS    vancomycin 750 mg in normal saline 250 mL IVPB (ready to mix system)  750 mg Intravenous Q12H    warfarin  5 mg Oral Daily     PRN Meds:sodium chloride, sodium chloride 0.9%    Review of patient's allergies indicates:  No Known Allergies  Objective:     Vital Signs (Most Recent):  Temp: 98.5 °F (36.9 °C) (01/29/18 1100)  Pulse: 72 (01/29/18 1119)  Resp: 18 (01/29/18 1100)  BP: (!) 80/0 (01/29/18 1100)  SpO2: 95 % (01/29/18 1100) Vital Signs (24h Range):  Temp:  [98.4 °F (36.9 °C)-99.2 °F (37.3 °C)] 98.5 °F (36.9 °C)  Pulse:  [64-80] 72  Resp:  [16-18] 18  SpO2:  [95 %-99 %] 95 %  BP: ()/(0-86) 80/0     Patient Vitals for the past 72 hrs (Last 3 readings):   Weight   01/29/18 0800 74.8 kg (164 lb 14.5 oz)   01/28/18 0715 70.6 kg (155 lb 10.3 oz)   01/27/18 0800 70.6 kg (155 lb 10.3 oz)     Body mass index is 24.35 kg/m².      Intake/Output Summary (Last 24 hours) at 01/29/18 1207  Last data filed at 01/29/18 1109   Gross per 24 hour   Intake             1940 ml   Output             1375 ml   Net              565 ml     Hemodynamic Parameters:    Physical Exam   Constitutional: He is oriented to person, place, and time. He appears well-developed and well-nourished. No distress.   HENT:   Head: Normocephalic and atraumatic.   Eyes: Pupils are  equal, round, and reactive to light. No scleral icterus.   Neck: Neck supple. No JVD present.   Cardiovascular:   + Smooth LVAD HUM    Pulmonary/Chest: Effort normal and breath sounds normal. He has no rales.   Abdominal: Soft. He exhibits no distension and no mass. There is no tenderness. There is no rebound.   Musculoskeletal: He exhibits no edema.   Neurological: He is alert and oriented to person, place, and time.   Skin: Skin is warm and dry. Capillary refill takes 2 to 3 seconds.   Psychiatric: He has a normal mood and affect. His behavior is normal. Judgment and thought content normal.   Vitals reviewed.    Significant Labs:  CBC:    Recent Labs  Lab 01/27/18  0534 01/28/18  0618 01/29/18  0454   WBC 5.63 6.07 5.89   RBC 2.57* 2.62* 2.58*   HGB 7.3* 7.4* 7.2*   HCT 23.3* 23.6* 23.1*   * 110* 115*   MCV 91 90 90   MCH 28.4 28.2 27.9   MCHC 31.3* 31.4* 31.2*     BNP:    Recent Labs  Lab 01/24/18  0525 01/26/18  0458 01/29/18  0454   * 865* 917*     CMP:    Recent Labs  Lab 01/24/18  0525  01/26/18  0458 01/27/18  0534 01/28/18  0618 01/29/18  0454   GLU 84  < > 85 79 78 75   CALCIUM 8.2*  < > 8.5* 8.4* 8.7 8.2*   ALBUMIN 2.2*  --  2.5*  --   --  2.3*   PROT 4.6*  --  5.2*  --   --  5.0*     < > 146* 146* 144 140   K 3.1*  < > 3.4* 3.6 4.2 3.9   CO2 27  < > 28 26 25 25     < > 113* 114* 112* 110   BUN 34*  < > 15 11 11 13   CREATININE 0.8  < > 0.8 0.8 0.8 0.8   ALKPHOS 49*  --  62  --   --  62   ALT 8*  --  24  --   --  16   AST 13  --  32  --   --  19   BILITOT 0.5  --  0.6  --   --  0.4   < > = values in this interval not displayed.   Coagulation:     Recent Labs  Lab 01/23/18  1344  01/27/18  0534 01/28/18  0618 01/29/18  0454   INR 3.7*  < > 1.6* 2.0* 2.6*   APTT 35.2*  --   --   --   --    < > = values in this interval not displayed.  LDH:    Recent Labs  Lab 01/27/18  0534 01/28/18  0618 01/29/18  0454    143 153     Microbiology:  Microbiology Results (last 7 days)     **  No results found for the last 168 hours. **        I have reviewed all pertinent labs within the past 24 hours.    Estimated Creatinine Clearance: 89.6 mL/min (based on SCr of 0.8 mg/dL).    Diagnostic Results:  I have reviewed and interpreted all pertinent imaging results/findings within the past 24 hours.    Assessment and Plan:     66 y/o male with a PMHx of NICM (EF 10%) s/p HeartMate 3 (Implanted 7/27/2017 as DT), HTN, DLD, poor appetite, recent ICD revision on 11/20/17 with Dr. Vidal complicated by pocket hematoma, recent admission for bacteremia (grew staph epi with negative GLENN) for which he has been on IV vancomycin per ID. He presents today after having dark stools over the past 48 hours in the setting of supratherapeutic INR. Home health nurse did labs today that showed Hgb of 4.1. He presented to [facility] where he has received 2 units of PRBCs and IV pantoprazole bolus. On presentation his vital signs and review of systems were grossly normal despite his very low Hgb so after discussion with Dr. Bautista it was decided to not emergently reverse his INR and he was transferred here for continuation of care. No LVAD alarms noted recently. States he feels like he is in his normal state of health with no recent illnesses. Denies NSAID use (aleve/ASA/ibuprofen)    Of note he does have a history of recent GIB bleed due to ileal (NSAID induced) ulcer requiring APC (double balloon enteroscopy by Dr. Chatterjee) and PRBC transfusion (Mid December). ASA stopped at time and PPI increased to BID.     * GI bleed    - HGB relatively stable with no melena or BRBPR  - DBE on 1/25 with no active bleeds but noted ulcers and polyps; retrograde DBE on 1/26 reported as negative  - Continue pantoprazole 80mg IV BID   - INR 2.6 today, has been restarted on Coumadin  - Not on ASA at baseline as below (held since December)  - Appreciate final recs from GI. (off NSAIDS, BID PPI, strict INR monitoring, follow CBC closely)        LVAD  (left ventricular assist device) present    - HM3 implanted 7/2017 as DT  - Not on ASA at baseline    - INR now at 2.6 today from 2.0 yesterday, no heparin bridge; will continueCoumadin 5 mg Daily (home dose)  - Intermittently pulsatile. MAP goal of 60-80  - Continue lisinopril 5mg daily  - No signs of acute volume overload, but has done well on lasix 40 mg BID at home. Will plan to send patient home on 1/2 dose (20 BID) as he is euvolemic on exam and have patient weigh himself when he gets home. CBC/BMP to follow patient closely with home health.        Bacteremia    - Will continue outpatient regimen of Vancomycin (S. Epi bacteremia from uknown source, end date estimation 2/19/18)  - Vanc trough 1/27 AM at 15, continue to follow            Isabel Chen PA-C  Heart Transplant  [facility]

## 2018-01-29 NOTE — PROGRESS NOTES
"[facility]  Adult Nutrition  Progress Note     SUMMARY      Recommendations     Recommendation/Intervention:   1. Continue Cardiac diet   2. [place] food requests/preferences within diet restrictions  3. Continue Boost Plus supplement  4.  Offer alternate when <50% consumed  5.  RD to follow      Goals: PO intake >50%  Nutrition Goal Status: Ongoing   Communication of RD Recs: reviewed with patient/family at bedside      Reason for Assessment     Reason for Assessment: RD follow up   Diagnosis: other (see comments) (GI bleed)  Relevant Medical History: CHF, CAD, HTN, Obesity, hyperlipidemia, LVAD placement (2017)  Interdisciplinary Rounds: did not attend  General Information Comments: Pt and family states appetite is improving.  Noted that he wears dentures and did not have them in at time of visit.  Offers no c/0 chewing or swallowing.  PO intake is varied ranging from %.       Nutrition Discharge Planning: adequate PO intake     Nutrition Prescription Ordered     Current Diet Order: Cardoac      Nutrition/Diet History     Patient Reported Diet/Restrictions/Preferences: general  Supplemental Drinks or Food Habits: Boost Plus (drinks at home)     Labs/Tests/Procedures/Meds     Pertinent Labs Reviewed: reviewed  Pertinent Labs Comments:   Pertinent Medications Reviewed: reviewed  Pertinent Medications Comments: Coumadin, pantoprazole     Physical Findings     Overall Physical Appearance: loss of muscle mass, loss of subcutaneous fat  Oral/Mouth Cavity: WDL  Skin: intact     Anthropometrics     Temp: 98.7 °F (37.1 °C)  Height: 5' 9" (175.3 cm)  Weight Method: Standard Scale  Weight: 72.2 kg (159 lb 2.8 oz)  Ideal Body Weight (IBW), Male: 160 lb  % Ideal Body Weight, Male (lb): 99.48 lb  BMI (Calculated): 23.6  Usual Body Weight (UBW), k kg  % Usual Body Weight: 96.47  % Weight Change From Usual Weight: -3.73 %     Estimated/Assessed Needs     Weight Used For Calorie Calculations: 72.1 kg " (159 lb)   Energy Calorie Requirements (kcal): 1858.25 (1.25 PAL)  Energy Need Method: [place] Jeor  RMR ([place] Jeor Equation): 1486.6  Weight Used For Protein Calculations: 72.1 kg (159 lb)  Protein Requirements: 87 g/d (1.2 g/kg)  RDA Method (mL): 1 mL/kcal or per MD     Assessment and Plan     Nutrition Problem  Potential for Inadequate energy intake     Related to (etiology):   Decreased appetite     Signs and Symptoms (as evidenced by):   Wt loss PTA     Nutrition Diagnosis Status:   Ongoing      Monitor and Evaluation     Food and Nutrient Intake: energy intake, food and beverage intake  Food and Nutrient Adminstration: diet order  Knowledge/Beliefs/Attitudes: food and nutrition knowledge/skill  Anthropometric Measurements: body mass index, weight change, weight, height/length  Biochemical Data, Medical Tests and Procedures: electrolyte and renal panel, gastrointestinal profile, glucose/endocrine profile, inflammatory profile, lipid profile  Nutrition-Focused Physical Findings: overall appearance        Nutrition Follow-Up    Follow up x1/week   RD Follow-up?: Yes

## 2018-01-30 NOTE — ASSESSMENT & PLAN NOTE
Daily E and M and VAD Interrogation Note    Reason for Visit:  Patient is seen in follow up for management of:  [x] HeartMate III     Interval History:  The [x] implant date was 7/27/2017.   Implantation of intracorporeal left ventricular assist device - HeartMate   III, under the CAP extension protocol.  Events overnight reviewed     Review of Systems:  All systems reviewed and negative    Medications:  Current Facility-Administered Medications   Medication Dose Route Frequency Provider Last Rate Last Dose    0.9%  NaCl infusion (for blood administration)   Intravenous Q24H PRN Giovanni Hayden, DO   50 mL at 01/26/18 1318    dronabinol capsule 5 mg  5 mg Oral TID AC Giovanni Hayden, DO   5 mg at 01/30/18 1017    furosemide tablet 20 mg  20 mg Oral BID Isabel Chen PA-C   20 mg at 01/30/18 0827    lisinopril tablet 5 mg  5 mg Oral Daily Giovanni Hayden, DO   5 mg at 01/30/18 0827    magnesium oxide tablet 400 mg  400 mg Oral BID Giovanni Hayden, DO   400 mg at 01/30/18 0827    pantoprazole injection 80 mg  80 mg Intravenous BID Giovanni Hayden, DO   80 mg at 01/30/18 0821    pravastatin tablet 20 mg  20 mg Oral QHS Giovanni Hayden, DO   20 mg at 01/29/18 2143    sodium chloride 0.9% flush 3 mL  3 mL Intravenous PRN Chandu Monterroso MD   3 mL at 01/30/18 0524    vancomycin 750 mg in normal saline 250 mL IVPB (ready to mix system)  750 mg Intravenous Q12H Giovanni Hayden, DO   750 mg at 01/30/18 1017    warfarin (COUMADIN) tablet 2.5 mg  2.5 mg Oral Daily Graciela Bautista MD           Physical Examination:  Vital Signs:   Vitals:    01/30/18 1200   BP: (!) 82/0   Pulse: 80   Resp: 18   Temp: 98.5 °F (36.9 °C)     Cardiovascular:  [x] Regular rate and rhythm [] Irregular []  None (MATT) []  Other  [x]  No edema []  Edema present  [x]  Clear to auscultation  VAD sounds smooth  Skin:  Incision is [x]  Clean, dry and intact.    Sternum:  [x]  Stable []  Unstable  Driveline(s):   [x]  Clean, dry and intact.        Labs:  CBC, BMP, LDH, INR reviewed      Procedure:  Device Interrogation including analysis of device parameters.  Current Settings   [x]  Ventricular Assist Device  []  Total Artificial Heart interrogated  Review of device function is [x]  Stable     TXP LVAD INTERROGATIONS 1/30/2018 1/30/2018 1/30/2018 1/30/2018 1/29/2018 1/29/2018 1/29/2018   Type HeartMate3 (No Data) HeartMate3 HeartMate3 HeartMate3 HeartMate3 -   Flow 3.9 - 3.7 4.0 4.2 4.3 4.3   Speed 5250 - 5200 5250 5200 5200 5100   PI 3.7 - 5.5 3.7 3.6 3.7 4.2   Power ([place]) 3.5 - 3.5 3.5 3.5 3.6 3.7   LSL - - 4800 - 4800 - -   Pulsatility - - - Pulse - Pulse -       Assessment:  [x]  Primary Cardiomyopathy [x]  Congestive Heart Failure   []  Atrial Fibrillation []  Ventricular Tachycardia   []  Aftercare cardiac device [x]  Long term (current) use of anticoagulants   []  Ventilator-associated pneumonia []  Pneumonia viral, unspecified   []  Pneumonia, bacterial, unspecified []  Pneumonia, organism unspecified   [x]  Hemorrhage of GI tract, unspecified    []  Nosebleed              Plan:  [x]  Interval history obtained from ICU attending team member during rounding today  [x]  VAD/MATT teaching performed with patient  [x]  Mobilization / Physical Therapy ongoing  [x]  Anticoagulation []  Ongoing [x]  Held  [x]  GI consulted    --DBE on 1/25 with no active bleeds but noted ulcers and polyps; retrograde DBE on 1/26 reported as negative  --pt being held for supra therapeutic INR of 3.1    Total time spent was 37 minutes.  Of which more than 50 percent of the care dominated counseling and coordinating care with different team members. The VAD was interrogated and all parameters were WNL and no significant findings were found in the history. All these findings are documented in the note above.      Date of Service: 01/30/2018

## 2018-01-30 NOTE — PROGRESS NOTES
[facility]  Heart Transplant  Progress Note    Patient Name: Suman Hayden  MRN: [id number]  Admission Date: 1/23/2018  Hospital Length of Stay: 7 days  Attending Physician: Graciela Bautista MD  Primary Care Provider: Joe Ernst MD  Principal Problem:GI bleed    Subjective:     Interval History: No events overnight. HGB stable this AM, INR now 3.1. Watch one more day, do not want patient to go home and re-bleed due to supra therapeutic INR.     Continuous Infusions:  Scheduled Meds:   dronabinol  5 mg Oral TID AC    furosemide  20 mg Oral BID    lisinopril  5 mg Oral Daily    magnesium oxide  400 mg Oral BID    magnesium sulfate IVPB  2 g Intravenous Once    pantoprazole  80 mg Intravenous BID    pravastatin  20 mg Oral QHS    vancomycin 750 mg in normal saline 250 mL IVPB (ready to mix system)  750 mg Intravenous Q12H    warfarin  5 mg Oral Daily     PRN Meds:sodium chloride, sodium chloride 0.9%    Review of patient's allergies indicates:  No Known Allergies  Objective:     Vital Signs (Most Recent):  Temp: 98.6 °F (37 °C) (01/30/18 0815)  Pulse: 79 (01/30/18 0815)  Resp: 18 (01/30/18 0815)  BP: (!) 82/0 (01/30/18 0827)  SpO2: 97 % (01/30/18 0815) Vital Signs (24h Range):  Temp:  [98 °F (36.7 °C)-98.7 °F (37.1 °C)] 98.6 °F (37 °C)  Pulse:  [58-80] 79  Resp:  [18] 18  SpO2:  [95 %-99 %] 97 %  BP: (70-95)/(0-61) 82/0     Patient Vitals for the past 72 hrs (Last 3 readings):   Weight   01/30/18 0700 76.1 kg (167 lb 12.3 oz)   01/29/18 0800 74.8 kg (164 lb 14.5 oz)   01/28/18 0715 70.6 kg (155 lb 10.3 oz)     Body mass index is 24.78 kg/m².      Intake/Output Summary (Last 24 hours) at 01/30/18 1118  Last data filed at 01/30/18 0800   Gross per 24 hour   Intake              950 ml   Output             1450 ml   Net             -500 ml     Hemodynamic Parameters:    Physical Exam   Constitutional: He is oriented to person, place, and time. He appears well-developed and  well-nourished. No distress.   HENT:   Head: Normocephalic and atraumatic.   Eyes: Pupils are equal, round, and reactive to light. No scleral icterus.   Neck: Neck supple. No JVD present.   Cardiovascular:   + Smooth LVAD HUM    Pulmonary/Chest: Effort normal and breath sounds normal. He has no rales.   Abdominal: Soft. He exhibits no distension and no mass. There is no tenderness. There is no rebound.   Musculoskeletal: He exhibits no edema.   Neurological: He is alert and oriented to person, place, and time.   Skin: Skin is warm and dry. Capillary refill takes 2 to 3 seconds.   Psychiatric: He has a normal mood and affect. His behavior is normal. Judgment and thought content normal.   Vitals reviewed.    Significant Labs:  CBC:    Recent Labs  Lab 01/28/18 0618 01/29/18 0454 01/30/18  0530   WBC 6.07 5.89 5.75   RBC 2.62* 2.58* 2.51*   HGB 7.4* 7.2* 7.1*   HCT 23.6* 23.1* 22.6*   * 115* 116*   MCV 90 90 90   MCH 28.2 27.9 28.3   MCHC 31.4* 31.2* 31.4*     BNP:    Recent Labs  Lab 01/24/18 0525 01/26/18 0458 01/29/18  0454   * 865* 917*     CMP:    Recent Labs  Lab 01/24/18 0525 01/26/18 0458  01/28/18 0618 01/29/18 0454 01/30/18  0530   GLU 84  < > 85  < > 78 75 81   CALCIUM 8.2*  < > 8.5*  < > 8.7 8.2* 8.1*   ALBUMIN 2.2*  --  2.5*  --   --  2.3*  --    PROT 4.6*  --  5.2*  --   --  5.0*  --      < > 146*  < > 144 140 139   K 3.1*  < > 3.4*  < > 4.2 3.9 3.9   CO2 27  < > 28  < > 25 25 27     < > 113*  < > 112* 110 109   BUN 34*  < > 15  < > 11 13 13   CREATININE 0.8  < > 0.8  < > 0.8 0.8 0.8   ALKPHOS 49*  --  62  --   --  62  --    ALT 8*  --  24  --   --  16  --    AST 13  --  32  --   --  19  --    BILITOT 0.5  --  0.6  --   --  0.4  --    < > = values in this interval not displayed.   Coagulation:     Recent Labs  Lab 01/23/18  1344  01/28/18  0618 01/29/18  0454 01/30/18  0530   INR 3.7*  < > 2.0* 2.6* 3.1*   APTT 35.2*  --   --   --   --    < > = values in this interval  not displayed.  LDH:    Recent Labs  Lab 01/28/18  0618 01/29/18  0454 01/30/18  0530    153 143     Microbiology:  Microbiology Results (last 7 days)     ** No results found for the last 168 hours. **        I have reviewed all pertinent labs within the past 24 hours.    Estimated Creatinine Clearance: 89.6 mL/min (based on SCr of 0.8 mg/dL).    Diagnostic Results:  I have reviewed and interpreted all pertinent imaging results/findings within the past 24 hours.    Assessment and Plan:     68 y/o male with a PMHx of NICM (EF 10%) s/p HeartMate 3 (Implanted 7/27/2017 as DT), HTN, DLD, poor appetite, recent ICD revision on 11/20/17 with Dr. Vidal complicated by pocket hematoma, recent admission for bacteremia (grew staph epi with negative GLENN) for which he has been on IV vancomycin per ID. He presents today after having dark stools over the past 48 hours in the setting of supratherapeutic INR. Home health nurse did labs today that showed Hgb of 4.1. He presented to [facility] where he has received 2 units of PRBCs and IV pantoprazole bolus. On presentation his vital signs and review of systems were grossly normal despite his very low Hgb so after discussion with Dr. Bautista it was decided to not emergently reverse his INR and he was transferred here for continuation of care. No LVAD alarms noted recently. States he feels like he is in his normal state of health with no recent illnesses. Denies NSAID use (aleve/ASA/ibuprofen)    Of note he does have a history of recent GIB bleed due to ileal (NSAID induced) ulcer requiring APC (double balloon enteroscopy by Dr. Chatterjee) and PRBC transfusion (Mid December). ASA stopped at time and PPI increased to BID.     * GI bleed    - HGB relatively stable with no melena or BRBPR  - DBE on 1/25 with no active bleeds but noted ulcers and polyps; retrograde DBE on 1/26 reported as negative  - Continue pantoprazole 80mg IV BID   - INR 3.1 today, has been restarted on Coumadin  - Not  on ASA at baseline as below (held since December)  - Appreciate final recs from GI. (off NSAIDS, BID PPI, strict INR monitoring, follow CBC closely)        LVAD (left ventricular assist device) present    - HM3 implanted 7/2017 as DT  - Not on ASA at baseline    - INR now at 3.1 today from 2.6 yesterday, no heparin bridge; will re dose coumadin tonight   - home dose 5 QD  - Intermittently pulsatile. MAP goal of 60-80  - Continue lisinopril 5mg daily  - No signs of acute volume overload, but has done well on lasix 40 mg BID at home. Will plan to send patient home on 1/2 dose (20 BID) as he is euvolemic on exam and have patient weigh himself when he gets home. CBC/BMP to follow patient closely with home health.        Bacteremia    - Will continue outpatient regimen of Vancomycin (S. Epi bacteremia from [facility] source, end date estimation 2/19/18)  - Vanc trough 1/27 AM at 15, continue to follow            Isabel Chen PA-C  Heart Transplant  [facility]

## 2018-01-30 NOTE — ASSESSMENT & PLAN NOTE
- HM3 implanted 7/2017 as DT  - Not on ASA at baseline    - INR now at 3.1 today from 2.6 yesterday, no heparin bridge; will re dose coumadin tonight   - home dose 5 QD  - Intermittently pulsatile. MAP goal of 60-80  - Continue lisinopril 5mg daily  - No signs of acute volume overload, but has done well on lasix 40 mg BID at home. Will plan to send patient home on 1/2 dose (20 BID) as he is euvolemic on exam and have patient weigh himself when he gets home. CBC/BMP to follow patient closely with home health.

## 2018-01-30 NOTE — SUBJECTIVE & OBJECTIVE
Interval History: No events overnight. HGB stable this AM, INR now 3.1. Watch one more day, do not want patient to go home and re-bleed due to supra therapeutic INR.     Continuous Infusions:  Scheduled Meds:   dronabinol  5 mg Oral TID AC    furosemide  20 mg Oral BID    lisinopril  5 mg Oral Daily    magnesium oxide  400 mg Oral BID    magnesium sulfate IVPB  2 g Intravenous Once    pantoprazole  80 mg Intravenous BID    pravastatin  20 mg Oral QHS    vancomycin 750 mg in normal saline 250 mL IVPB (ready to mix system)  750 mg Intravenous Q12H    warfarin  5 mg Oral Daily     PRN Meds:sodium chloride, sodium chloride 0.9%    Review of patient's allergies indicates:  No Known Allergies  Objective:     Vital Signs (Most Recent):  Temp: 98.6 °F (37 °C) (01/30/18 0815)  Pulse: 79 (01/30/18 0815)  Resp: 18 (01/30/18 0815)  BP: (!) 82/0 (01/30/18 0827)  SpO2: 97 % (01/30/18 0815) Vital Signs (24h Range):  Temp:  [98 °F (36.7 °C)-98.7 °F (37.1 °C)] 98.6 °F (37 °C)  Pulse:  [58-80] 79  Resp:  [18] 18  SpO2:  [95 %-99 %] 97 %  BP: (70-95)/(0-61) 82/0     Patient Vitals for the past 72 hrs (Last 3 readings):   Weight   01/30/18 0700 76.1 kg (167 lb 12.3 oz)   01/29/18 0800 74.8 kg (164 lb 14.5 oz)   01/28/18 0715 70.6 kg (155 lb 10.3 oz)     Body mass index is 24.78 kg/m².      Intake/Output Summary (Last 24 hours) at 01/30/18 1118  Last data filed at 01/30/18 0800   Gross per 24 hour   Intake              950 ml   Output             1450 ml   Net             -500 ml     Hemodynamic Parameters:    Physical Exam   Constitutional: He is oriented to person, place, and time. He appears well-developed and well-nourished. No distress.   HENT:   Head: Normocephalic and atraumatic.   Eyes: Pupils are equal, round, and reactive to light. No scleral icterus.   Neck: Neck supple. No JVD present.   Cardiovascular:   + Smooth LVAD HUM    Pulmonary/Chest: Effort normal and breath sounds normal. He has no rales.   Abdominal:  Soft. He exhibits no distension and no mass. There is no tenderness. There is no rebound.   Musculoskeletal: He exhibits no edema.   Neurological: He is alert and oriented to person, place, and time.   Skin: Skin is warm and dry. Capillary refill takes 2 to 3 seconds.   Psychiatric: He has a normal mood and affect. His behavior is normal. Judgment and thought content normal.   Vitals reviewed.    Significant Labs:  CBC:    Recent Labs  Lab 01/28/18 0618 01/29/18 0454 01/30/18  0530   WBC 6.07 5.89 5.75   RBC 2.62* 2.58* 2.51*   HGB 7.4* 7.2* 7.1*   HCT 23.6* 23.1* 22.6*   * 115* 116*   MCV 90 90 90   MCH 28.2 27.9 28.3   MCHC 31.4* 31.2* 31.4*     BNP:    Recent Labs  Lab 01/24/18 0525 01/26/18 0458 01/29/18  0454   * 865* 917*     CMP:    Recent Labs  Lab 01/24/18 0525 01/26/18 0458 01/28/18 0618 01/29/18 0454 01/30/18  0530   GLU 84  < > 85  < > 78 75 81   CALCIUM 8.2*  < > 8.5*  < > 8.7 8.2* 8.1*   ALBUMIN 2.2*  --  2.5*  --   --  2.3*  --    PROT 4.6*  --  5.2*  --   --  5.0*  --      < > 146*  < > 144 140 139   K 3.1*  < > 3.4*  < > 4.2 3.9 3.9   CO2 27  < > 28  < > 25 25 27     < > 113*  < > 112* 110 109   BUN 34*  < > 15  < > 11 13 13   CREATININE 0.8  < > 0.8  < > 0.8 0.8 0.8   ALKPHOS 49*  --  62  --   --  62  --    ALT 8*  --  24  --   --  16  --    AST 13  --  32  --   --  19  --    BILITOT 0.5  --  0.6  --   --  0.4  --    < > = values in this interval not displayed.   Coagulation:     Recent Labs  Lab 01/23/18  1344  01/28/18  0618 01/29/18  0454 01/30/18  0530   INR 3.7*  < > 2.0* 2.6* 3.1*   APTT 35.2*  --   --   --   --    < > = values in this interval not displayed.  LDH:    Recent Labs  Lab 01/28/18  0618 01/29/18  0454 01/30/18  0530    153 143     Microbiology:  Microbiology Results (last 7 days)     ** No results found for the last 168 hours. **        I have reviewed all pertinent labs within the past 24 hours.    Estimated Creatinine Clearance: 89.6  mL/min (based on SCr of 0.8 mg/dL).    Diagnostic Results:  I have reviewed and interpreted all pertinent imaging results/findings within the past 24 hours.

## 2018-01-30 NOTE — PROGRESS NOTES
EUSEBIA Chen at bedside, notified patient's Mag 1.7. NP orders for MagSulfate 2g, IVPB, ONCE, NOW. Orders placed and to be carried out. Will continue to monitor.

## 2018-01-30 NOTE — ASSESSMENT & PLAN NOTE
- Will continue outpatient regimen of Vancomycin (S. Epi bacteremia from [facility] source, end date estimation 2/19/18)  - Vanc trough 1/27 AM at 15, continue to follow

## 2018-01-30 NOTE — ASSESSMENT & PLAN NOTE
- HGB relatively stable with no melena or BRBPR  - DBE on 1/25 with no active bleeds but noted ulcers and polyps; retrograde DBE on 1/26 reported as negative  - Continue pantoprazole 80mg IV BID   - INR 3.1 today, has been restarted on Coumadin  - Not on ASA at baseline as below (held since December)  - Appreciate final recs from GI. (off NSAIDS, BID PPI, strict INR monitoring, follow CBC closely)

## 2018-01-30 NOTE — PROGRESS NOTES
[facility]  Cardiothoracic Surgery  Progress Note    Patient Name: Suman Hayden  MRN: [id number]  Admission Date: 1/23/2018  Hospital Length of Stay: 7 days  Code Status: Full Code   Attending Physician: Graciela Bautista MD   Referring Provider: Referring, Unknown  Principal Problem:GI bleed    Subjective:     Post-Op Info:  Procedure(s) (LRB):  Retrograde deivce assisted enteroscopy (N/A)   4 Days Post-Op     No new subjective & objective note has been filed under this hospital service since the last note was generated.    Assessment/Plan:     LVAD (left ventricular assist device) present    Daily E and M and VAD Interrogation Note    Reason for Visit:  Patient is seen in follow up for management of:  [x] HeartMate III     Interval History:  The [x] implant date was 7/27/2017.   Implantation of intracorporeal left ventricular assist device - HeartMate   III, under the CAP extension protocol.  Events overnight reviewed     Review of Systems:  All systems reviewed and negative    Medications:  Current Facility-Administered Medications   Medication Dose Route Frequency Provider Last Rate Last Dose    0.9%  NaCl infusion (for blood administration)   Intravenous Q24H PRN Giovanni Hayden, DO   50 mL at 01/26/18 1318    dronabinol capsule 5 mg  5 mg Oral TID AC Giovanni Hayden DO   5 mg at 01/30/18 1017    furosemide tablet 20 mg  20 mg Oral BID Isabel Chen PA-C   20 mg at 01/30/18 0827    lisinopril tablet 5 mg  5 mg Oral Daily Giovanni Hayden DO   5 mg at 01/30/18 0827    magnesium oxide tablet 400 mg  400 mg Oral BID Giovanni Hayden DO   400 mg at 01/30/18 0827    pantoprazole injection 80 mg  80 mg Intravenous BID Giovanni Hayden DO   80 mg at 01/30/18 0821    pravastatin tablet 20 mg  20 mg Oral QHS Giovanni Hayden DO   20 mg at 01/29/18 2143    sodium chloride 0.9% flush 3 mL  3 mL Intravenous PRN Chandu Monterroso MD   3 mL at 01/30/18 0524    vancomycin 750 mg in normal  saline 250 mL IVPB (ready to mix system)  750 mg Intravenous Q12H Giovanni Hayden, DO   750 mg at 01/30/18 1017    warfarin (COUMADIN) tablet 2.5 mg  2.5 mg Oral Daily Graciela Bautista MD           Physical Examination:  Vital Signs:   Vitals:    01/30/18 1200   BP: (!) 82/0   Pulse: 80   Resp: 18   Temp: 98.5 °F (36.9 °C)     Cardiovascular:  [x] Regular rate and rhythm [] Irregular []  None (MATT) []  Other  [x]  No edema []  Edema present  [x]  Clear to auscultation  VAD sounds smooth  Skin:  Incision is [x]  Clean, dry and intact.    Sternum:  [x]  Stable []  Unstable  Driveline(s):   [x]  Clean, dry and intact.       Labs:  CBC, BMP, LDH, INR reviewed      Procedure:  Device Interrogation including analysis of device parameters.  Current Settings   [x]  Ventricular Assist Device  []  Total Artificial Heart interrogated  Review of device function is [x]  Stable     TXP LVAD INTERROGATIONS 1/30/2018 1/30/2018 1/30/2018 1/30/2018 1/29/2018 1/29/2018 1/29/2018   Type HeartMate3 (No Data) HeartMate3 HeartMate3 HeartMate3 HeartMate3 -   Flow 3.9 - 3.7 4.0 4.2 4.3 4.3   Speed 5250 - 5200 5250 5200 5200 5100   PI 3.7 - 5.5 3.7 3.6 3.7 4.2   Power ([place]) 3.5 - 3.5 3.5 3.5 3.6 3.7   LSL - - 4800 - 4800 - -   Pulsatility - - - Pulse - Pulse -       Assessment:  [x]  Primary Cardiomyopathy [x]  Congestive Heart Failure   []  Atrial Fibrillation []  Ventricular Tachycardia   []  Aftercare cardiac device [x]  Long term (current) use of anticoagulants   []  Ventilator-associated pneumonia []  Pneumonia viral, unspecified   []  Pneumonia, bacterial, unspecified []  Pneumonia, organism unspecified   [x]  Hemorrhage of GI tract, unspecified    []  Nosebleed              Plan:  [x]  Interval history obtained from ICU attending team member during rounding today  [x]  VAD/MATT teaching performed with patient  [x]  Mobilization / Physical Therapy ongoing  [x]  Anticoagulation []  Ongoing [x]  Held  [x]  GI consulted    --DBE on 1/25  with no active bleeds but noted ulcers and polyps; retrograde DBE on 1/26 reported as negative  --pt being held for supra therapeutic INR of 3.1    Total time spent was 37 minutes.  Of which more than 50 percent of the care dominated counseling and coordinating care with different team members. The VAD was interrogated and all parameters were WNL and no significant findings were found in the history. All these findings are documented in the note above.      Date of Service: 01/30/2018                 Patient seen and evaluated with relevant plan as above. Documented in the note above Interrogation of Ventricular assist device was performed with physician analysis of device parameters and review of device function. I have personally reviewed the interrogation findings and agree with findings as stated.

## 2018-01-30 NOTE — PLAN OF CARE
Problem: Patient Care Overview  Goal: Plan of Care Review  Outcome: Ongoing (interventions implemented as appropriate)  Patient cooperative and pleasant with routine care and procedures.  Wife at bedside and participating in care and dressing changes for LVAD.  Patient resting quietly without complaints.  Anticipating discharge to home in am.  Fall precautions reviewed and patient and spouse verbalized understanding.  Will continue to monitor.

## 2018-01-31 NOTE — ASSESSMENT & PLAN NOTE
- DBE on 1/25 with no active bleeds but noted ulcers and polyps; retrograde DBE on 1/26 reported as negative  - Continue pantoprazole 80mg IV BID. Resume previous home dose Protonix 40 BID at d/c  - INR 3.1 today from 3.1 yesterday, decreased coumadin yesterday  - Not on ASA at baseline as below (held since December)  - Appreciate final recs from GI. (off NSAIDS, BID PPI, strict INR monitoring, follow CBC closely)

## 2018-01-31 NOTE — PROGRESS NOTES
DISCHARGE    SW to pt's room for discharge today.  Pt presents as sitting up in bedside chair with wife/primary caregiver Kia at bedside.  Pt and wife both present as aao x4, calm, cooperative, and asking and answering questions appropriately.  Pt and wife verbalize understanding and agreement with plan for d/c to home today with HH & IV abx.  Wife will transport pt today and pt will return to dgtr's home in [place], [place].  SW notified [facility] of d/c today; [facility] will notify [place] [facility].  SW notified Sharlene with [facility] (ph: [phone], f: [phone]) of d/c today and confirmed [facility] has Epic access.  Per Sharlene, [facility] will deliver evening dose of vanc to dgtr's home in [place].  Pt reports coping adequately at this time, and denies any needs or concerns to SW.  SW providing ongoing psychosocial and counseling support, education, resources, assistance, and discharge planning as indicated.  SW remains available.

## 2018-01-31 NOTE — PROGRESS NOTES
Mr. Suman Hayden is being discharged today following admission for evaluation of GI bleeding. His pMH is significant for HM3 lvad, GI bleeding, HTN, bacteremia on iv vancomycin.     During his hospital stay he was evaluated by GI, no definitive source of bleeding was identified for intervention. His inr goal remains 2-3, with no aspirin. His inr today is 3.1. He was administered coumadin 2.5mg yesterday and was instructed to resume his home dose today at 5mg orally daily.  Follow up inr requested with  and coumadin clinic.     Mr. Hayden was provided a new medication administration card prior to discharge. A new prescription for his marinol was phoned in to his home pharmacy.     Thank you.

## 2018-01-31 NOTE — PLAN OF CARE
Problem: Patient Care Overview  Goal: Plan of Care Review  Plan of care discussed with patient.  Fall precautions in place. Patient has no complaints of pain. Discussed medications and care. VAD numbers and dopplers WNL. 1U PRBC given. Patient has no questions at this time.White board updated. Bed locked in lowest position. Side rails up x2. Will continue to monitor.

## 2018-01-31 NOTE — PHYSICIAN QUERY
PT Name: Suman Hayden  MR #: [id number]     Physician Query Form - Documentation Clarification      CDS/: Sergio Josue Jr, RN              Contact information:[phone]    This form is a permanent document in the medical record.     Query Date: January 31, 2018    By submitting this query, we are merely seeking further clarification of documentation. Please utilize your independent clinical judgment when addressing the question(s) below.    The Medical record reflects the following:    Supporting Clinical Findings Location in Medical Record   Hypertension    Nonischemic cardiomyopathy    68 y/o male with a PMHx of NICM (EF 10%) s/p HeartMate 3  1/23 H&P    1/24 Anesthesia Preprocedure Note    1/30 Heart Transplant Progress Note                                                                                      Doctor, Please specify diagnosis or diagnoses associated with above clinical findings.  Please Specify the Nonischemic Cardiomyopathy    Provider Use Only    (    )Hypertrophic Cardiomyopathy    (  x  )Dialated Cardiomyopathy    (    )Restrictive Cardiomyopathy    (    )_____________________________________                                                                                                           [  ] Clinically undetermined

## 2018-01-31 NOTE — ASSESSMENT & PLAN NOTE
- HM3 implanted 7/2017 as DT  - Not on ASA (off since 12/2017- had GIB bleed due to ileal (NSAID induced) ulcer)   - INR now at 3.1 today from 3.1 yesterday, coumadin dose decreased yesterday  - Intermittently pulsatile. MAP goal of 60-80  - Continue lisinopril 5mg daily  - No signs of acute volume overload, but has done well on lasix 40 mg BID at home. Will plan to send patient home on 1/2 dose (20 BID) as he is euvolemic on exam and have patient weigh himself when he gets home.   -Home today with CBC/BMP/INR to follow patient closely with home health.

## 2018-01-31 NOTE — PROGRESS NOTES
[facility]  Heart Transplant  Progress Note    Patient Name: Suman Hayden  MRN: [id number]  Admission Date: 1/23/2018  Hospital Length of Stay: 8 days  Attending Physician: Graciela Bautista MD  Primary Care Provider: Joe Ernst MD  Principal Problem:GI bleed    Subjective:     Interval History: No events overnight. S/p 1 unit PRBCs yesterday. Pt is feeling great this am. No complaints and ready to go home    Continuous Infusions:  Scheduled Meds:   dronabinol  5 mg Oral TID AC    furosemide  20 mg Oral BID    lisinopril  5 mg Oral Daily    magnesium oxide  400 mg Oral BID    pantoprazole  80 mg Intravenous BID    potassium chloride  20 mEq Oral Daily    pravastatin  20 mg Oral QHS    vancomycin 750 mg in normal saline 250 mL IVPB (ready to mix system)  750 mg Intravenous Q12H    warfarin  2.5 mg Oral Daily     PRN Meds:sodium chloride, sodium chloride, sodium chloride 0.9%    Review of patient's allergies indicates:  No Known Allergies  Objective:     Vital Signs (Most Recent):  Temp: 97.8 °F (36.6 °C) (01/31/18 0730)  Pulse: 80 (01/31/18 0730)  Resp: 16 (01/31/18 0730)  BP: (!) 80/0 (01/31/18 0730)  SpO2: 99 % (01/31/18 0730) Vital Signs (24h Range):  Temp:  [97.8 °F (36.6 °C)-98.9 °F (37.2 °C)] 97.8 °F (36.6 °C)  Pulse:  [63-83] 80  Resp:  [16-20] 16  SpO2:  [96 %-99 %] 99 %  BP: ()/(0-61) 80/0     Patient Vitals for the past 72 hrs (Last 3 readings):   Weight   01/30/18 0700 76.1 kg (167 lb 12.3 oz)   01/29/18 0800 74.8 kg (164 lb 14.5 oz)     Body mass index is 24.78 kg/m².      Intake/Output Summary (Last 24 hours) at 01/31/18 1004  Last data filed at 01/31/18 0500   Gross per 24 hour   Intake             1580 ml   Output             2350 ml   Net             -770 ml     Hemodynamic Parameters:    Physical Exam   Constitutional: He is oriented to person, place, and time. He appears well-developed and well-nourished. No distress.   HENT:   Head: Normocephalic and  atraumatic.   Eyes: Pupils are equal, round, and reactive to light. No scleral icterus.   Neck: Neck supple. No JVD present.   Cardiovascular:   + Smooth LVAD HUM    Pulmonary/Chest: Effort normal and breath sounds normal. He has no rales.   Abdominal: Soft. He exhibits no distension and no mass. There is no tenderness. There is no rebound.   Musculoskeletal: He exhibits no edema.   Neurological: He is alert and oriented to person, place, and time.   Skin: Skin is warm and dry.   Psychiatric: He has a normal mood and affect. His behavior is normal. Judgment and thought content normal.   Vitals reviewed.    Significant Labs:  CBC:    Recent Labs  Lab 01/29/18 0454 01/30/18  0530 01/31/18  0554   WBC 5.89 5.75 5.66   RBC 2.58* 2.51* 2.82*   HGB 7.2* 7.1* 8.0*   HCT 23.1* 22.6* 25.0*   * 116* 124*   MCV 90 90 89   MCH 27.9 28.3 28.4   MCHC 31.2* 31.4* 32.0     BNP:    Recent Labs  Lab 01/26/18 0458 01/29/18  0454 01/31/18  0554   * 917* 539*     CMP:    Recent Labs  Lab 01/26/18 0458 01/29/18 0454 01/30/18  0530 01/31/18  0554   GLU 85  < > 75 81 75   CALCIUM 8.5*  < > 8.2* 8.1* 8.4*   ALBUMIN 2.5*  --  2.3*  --  2.4*   PROT 5.2*  --  5.0*  --  5.1*   *  < > 140 139 140   K 3.4*  < > 3.9 3.9 3.7   CO2 28  < > 25 27 26   *  < > 110 109 108   BUN 15  < > 13 13 12   CREATININE 0.8  < > 0.8 0.8 0.8   ALKPHOS 62  --  62  --  61   ALT 24  --  16  --  14   AST 32  --  19  --  17   BILITOT 0.6  --  0.4  --  0.4   < > = values in this interval not displayed.   Coagulation:     Recent Labs  Lab 01/29/18 0454 01/30/18  0530 01/31/18  0554   INR 2.6* 3.1* 3.1*     LDH:    Recent Labs  Lab 01/29/18  0454 01/30/18  0530 01/31/18  0554    143 146     Microbiology:  Microbiology Results (last 7 days)     ** No results found for the last 168 hours. **        I have reviewed all pertinent labs within the past 24 hours.    Estimated Creatinine Clearance: 89.6 mL/min (based on SCr of 0.8  mg/dL).    Diagnostic Results:  I have reviewed and interpreted all pertinent imaging results/findings within the past 24 hours.    Assessment and Plan:     68 y/o male with a PMHx of NICM (EF 10%) s/p HeartMate 3 (Implanted 7/27/2017 as DT), HTN, DLD, poor appetite, recent ICD revision on 11/20/17 with Dr. Vidal complicated by pocket hematoma, recent admission for bacteremia (grew staph epi with negative GLENN) for which he has been on IV vancomycin per ID. He presents today after having dark stools over the past 48 hours in the setting of supratherapeutic INR. Home health nurse did labs today that showed Hgb of 4.1. He presented to [facility] where he has received 2 units of PRBCs and IV pantoprazole bolus. On presentation his vital signs and review of systems were grossly normal despite his very low Hgb so after discussion with Dr. Bautista it was decided to not emergently reverse his INR and he was transferred here for continuation of care. No LVAD alarms noted recently. States he feels like he is in his normal state of health with no recent illnesses. Denies NSAID use (aleve/ASA/ibuprofen)    Of note he does have a history of recent GIB bleed due to ileal (NSAID induced) ulcer requiring APC (double balloon enteroscopy by Dr. Chatterjee) and PRBC transfusion (Mid December). ASA stopped at time and PPI increased to BID.     * GI bleed    - DBE on 1/25 with no active bleeds but noted ulcers and polyps; retrograde DBE on 1/26 reported as negative  - Continue pantoprazole 80mg IV BID. Resume previous home dose Protonix 40 BID at d/c  - INR 3.1 today from 3.1 yesterday, decreased coumadin yesterday  - Not on ASA at baseline as below (held since December)  - Appreciate final recs from GI. (off NSAIDS, BID PPI, strict INR monitoring, follow CBC closely)        LVAD (left ventricular assist device) present    - HM3 implanted 7/2017 as DT  - Not on ASA (off since 12/2017- had GIB bleed due to ileal (NSAID induced) ulcer)   - INR  now at 3.1 today from 3.1 yesterday, coumadin dose decreased yesterday  - Intermittently pulsatile. MAP goal of 60-80  - Continue lisinopril 5mg daily  - No signs of acute volume overload, but has done well on lasix 40 mg BID at home. Will plan to send patient home on 1/2 dose (20 BID) as he is euvolemic on exam and have patient weigh himself when he gets home.   -Home today with CBC/BMP/INR to follow patient closely with home health.        Bacteremia    - Will continue outpatient regimen of Vancomycin (S. Epi bacteremia from [facility] source, end date estimation 2/19/18)  - Vanc trough 1/27 AM at 15, continue to follow            Susannah Elizabeth PA-C  Heart Transplant  [facility]

## 2018-01-31 NOTE — PROCEDURES
Patient in chair, wife at bedside. VAD interrogation completed this AM in the event changes needed to be made. Will continue to monitor for further issues.     Pulsatile: Yes   VAD Sounds: HM3  Smooth  Problems / Issues / Alarms with VAD if any: a few PI events  HCT:25    VAD Interrogation:  TXP LVAD INTERROGATIONS 1/31/2018 1/31/2018 1/31/2018 1/30/2018 1/30/2018 1/30/2018 1/30/2018   Type HeartMate3 HeartMate3 HeartMate3 HeartMate3 HeartMate3 HeartMate3 (No Data)   Flow 4.0 3.9 4.1 4.3 4.1 3.9 -   Speed 5200 5200 5200 5200 5200 5250 -   PI 3.9 3.9 3.7 4.1 3.7 3.7 -   Power ([place]) 3.5 3.5 3.6 3.5 3.5 3.5 -   LSL - - - - - - -   Pulsatility - - - - - - -   }

## 2018-01-31 NOTE — PHYSICIAN QUERY
"PT Name: Suman Hayden  MR #: [id number]    Physician Query Form - Heart  Condition Clarification     CDS/: Sergio Josue Jr, RN              Contact information:[phone]  This form is a permanent document in the medical record.   Query Withdrawn After consulting with Inpatient . RBR 1/31 0802  Query Date: January 31, 2018    By submitting this query, we are merely seeking further clarification of documentation. Please utilize your independent clinical judgment when addressing the question(s) below.    The medical record contains the following   Indicators     Supporting Clinical Findings Location in Medical Record   x -->617-->865-->917 1/23-1/29 MAR    EF      Radiology findings     x Echo Results Results for orders placed or performed during the hospital encounter of 11/20/17  2D echo with color flow doppler  EF 13 (A)  Diastolic Dysfunction Yes (A)   1/24 Anesthesia Preprocedure note    "Ascites" documented     x "SOB" or "PEREIRA" documented Respiratory: Negative for chest tightness, shortness of breath and wheezing.   1/23 H&P    "Hypoxia" documented     x Heart Failure documented CHF (congestive heart failure) 1/24 Gastroenterology H&P   x "Edema" documented Musculoskeletal: He exhibits no edema.  1/23 H&P   x Diuretics/Meds furosemide tablet 40 mg 2 times daily  furosemide tablet 20 mg 2 Times Daily 1/24-1/26 MAR  1/29-1/31 MAR   x Treatment: LVAD (left ventricular assist device) present    - HM3 implanted 7/2017 as DT 1/30 Heart Transplant Progress Note   x Other:  No signs of acute volume overload, but has done well on lasix 40 mg BID at home 1/30 Heart Transplant Progress Note       Provider, please specify diagnosis or diagnoses associated with above clinical findings.  Specify the type and acuity of CHF (congestive heart failure)                               [  ] Chronic Systolic Heart Failure (EF < 40)*  [  ] Chronic Combined Systolic and Diastolic Heart Failure  [  ] Other " (please specify): ___________________________________  [  ] Clinically Undetermined            *American Heart Association                                                                                                          Please document in your progress notes daily for the duration of treatment until resolved and include in your discharge summary.

## 2018-02-01 NOTE — HOSPITAL COURSE
Pt was admitted to [facility]. GI was consulted and he was put on IV PPI BID. Pt had DBE on 1/25/18 which showed with no active bleeds but noted ulcers and polyps. Pt then had retrograde DBE on 1/26/18 which was negative. GI's final recs were to continue to keep pt off NSAIDs/ASA, cont PPI BID, and closely follow CBC and INR. Pt was transfused PRBCs during hospitalization and Hgb on day of d/c is 8. We will plan to check CBC weekly and do close INR monitoring. Pt will be d/c'ed on his previous dose of Vanc for recent bacteremia. Home Health will check INR and CBC tomorrow and he will have f/u in VAD clinic next week.

## 2018-02-01 NOTE — PROGRESS NOTES
Per note: Mr. Suman Hayden is being discharged today following admission for evaluation of GI bleeding. His pMH is significant for HM3 lvad, GI bleeding, HTN, bacteremia on iv vancomycin.      During his hospital stay he was evaluated by GI, no definitive source of bleeding was identified for intervention. His inr goal remains 2-3, with no aspirin. His inr today is 3.1. He was administered coumadin 2.5mg yesterday and was instructed to resume his home dose today at 5mg orally daily.  Follow up inr requested with  and coumadin clinic. Per Flora, INR 2/1. lvm for pt to confirm dose

## 2018-02-01 NOTE — DISCHARGE SUMMARY
[facility]  Heart Transplant  Discharge Summary      Patient Name: Suman Hayden  MRN: [id number]  Admission Date: 1/23/2018  Hospital Length of Stay: 8 days  Discharge Date and Time: 02/01/2018 2:50 PM  Attending Physician: No att. providers found   Discharging Provider: Susannah Elizabeth PA-C  Primary Care Provider: Joe rEnst MD     HPI: 68 y/o male with a PMHx of NICM (EF 10%) s/p HeartMate 3 (Implanted 7/27/2017 as DT), HTN, DLD, poor appetite, recent ICD revision on 11/20/17 with Dr. Winchester complicated by pocket hematoma, recent admission for bacteremia (grew staph epi with negative GLENN) for which he has been on IV vancomycin per ID. He presents today after having dark stools over the past 48 hours in the setting of supratherapeutic INR. Home health nurse did labs today that showed Hgb of 4.1. He presented to [facility] where he has received 2 units of PRBCs and IV pantoprazole bolus. On presentation his vital signs and review of systems were grossly normal despite his very low Hgb so after discussion with Dr. Bautista it was decided to not emergently reverse his INR and he was transferred here for continuation of care. No LVAD alarms noted recently. States he feels like he is in his normal state of health with no recent illnesses. Denies NSAID use (aleve/ASA/ibuprofen)    Of note he does have a history of recent GIB bleed due to ileal (NSAID induced) ulcer requiring APC (double balloon enteroscopy by Dr. Chatterjee) and PRBC transfusion (Mid December). ASA stopped at time and PPI increased to BID.     Procedure(s) (LRB):  Retrograde deivce assisted enteroscopy (N/A)     Hospital Course: Pt was admitted to [facility]. GI was consulted and he was put on IV PPI BID. Pt had DBE on 1/25/18 which showed with no active bleeds but noted ulcers and polyps. Pt then had retrograde DBE on 1/26/18 which was negative. GI's final recs were to continue to keep pt off NSAIDs/ASA, cont PPI BID, and closely follow  CBC and INR. Pt was transfused PRBCs during hospitalization and Hgb on day of d/c is 8. We will plan to check CBC weekly and do close INR monitoring. Pt will be d/c'ed on his previous dose of Vanc for recent bacteremia. Home Health will check INR and CBC tomorrow and he will have f/u in VAD clinic next week.    Consults         Status Ordering Provider     Inpatient consult to Gastroenterology  Once     Provider:  (Not yet assigned)    Completed SARA LOPEZ     Inpatient consult to Registered Dietitian/Nutritionist  Once     Provider:  (Not yet assigned)    Completed SARA LOPEZ              Pending Diagnostic Studies:     None        Final Active Diagnoses:    Diagnosis Date Noted POA    PRINCIPAL PROBLEM:  GI bleed [K92.2] 01/23/2018 Yes    LVAD (left ventricular assist device) present [Z95.811] 07/29/2017 Not Applicable    Bacteremia [R78.81] 08/18/2017 Yes    Melena [K92.1] 01/25/2018 Yes    AICD (automatic cardioverter/defibrillator) present [Z95.810] 08/06/2017 Yes      Problems Resolved During this Admission:    Diagnosis Date Noted Date Resolved POA      Discharged Condition: stable    Disposition: Home or Self Care      Patient Instructions:     Diet Adult Regular   Order Specific Question Answer Comments   Na restriction, if any: 2gNa      Activity as tolerated     Notify your health care provider if you experience any of the following:  temperature >100.4     Notify your health care provider if you experience any of the following:  persistent nausea and vomiting or diarrhea     Notify your health care provider if you experience any of the following:  severe uncontrolled pain     Notify your health care provider if you experience any of the following:  redness, tenderness, or signs of infection (pain, swelling, redness, odor or green/yellow discharge around incision site)     Notify your health care provider if you experience any of the following:  difficulty breathing or increased cough      Notify your health care provider if you experience any of the following:  severe persistent headache     Notify your health care provider if you experience any of the following:  persistent dizziness, light-headedness, or visual disturbances     Notify your health care provider if you experience any of the following:  increased confusion or weakness       Medications:  Reconciled Home Medications:   Discharge Medication List as of 1/31/2018  1:47 PM      START taking these medications    Details   furosemide (LASIX) 20 MG tablet Take 1 tablet (20 mg total) by mouth 2 (two) times daily., Starting Mon 1/29/2018, Until Tue 1/29/2019, Normal      potassium chloride (MICRO-K) 10 MEQ CpSR Take 2 capsules (20 mEq total) by mouth once daily., Starting Thu 2/1/2018, Normal      VANCOMYCIN HCL (VANCOMYCIN 750 MG/250 ML 0.9 % NS, READY TO MIX SYSTEM,) Inject 250 mLs (750 mg total) into the vein every 12 (twelve) hours., Starting Mon 1/29/2018, No Print         CONTINUE these medications which have CHANGED    Details   dronabinol (MARINOL) 5 MG capsule Take 1 capsule (5 mg total) by mouth 3 (three) times daily before meals., Starting Wed 1/31/2018, Phone In      warfarin (COUMADIN) 5 MG tablet Take 5mg orally daily, No Print         CONTINUE these medications which have NOT CHANGED    Details   lisinopril (PRINIVIL,ZESTRIL) 5 MG tablet Take 1 tablet (5 mg total) by mouth once daily., Starting Thu 1/11/2018, Normal      magnesium oxide (MAG-OX) 400 mg tablet Take 1 tablet (400 mg total) by mouth 2 (two) times daily., Starting Wed 11/22/2017, OTC      pantoprazole (PROTONIX) 40 MG tablet Take 1 tablet (40 mg total) by mouth 2 (two) times daily before meals., Starting Tue 12/19/2017, Until Wed 12/19/2018, Normal      polyethylene glycol (GLYCOLAX) 17 gram/dose powder Take 17 g by mouth daily as needed., Starting Tue 12/19/2017, No Print      pravastatin (PRAVACHOL) 20 MG tablet Take 1 tablet (20 mg total) by mouth every  evening., Starting Thu 10/26/2017, Normal             Susannah Elizabeth PA-C  Heart Transplant  [facility]

## 2018-02-02 NOTE — PATIENT INSTRUCTIONS
Lower GI Bleeding (Stable)  You have signs of blood in your stool. This is called rectal bleeding. The bleeding may have begun in another part of your gastrointestinal (GI) tract. If the blood is bright red, it is likely coming from the lower part of the GI tract. If the blood is black or dark, it might be coming from higher up in the GI tract. Very small amounts of GI bleeding may not be visible and can only be discovered during a test on your stool. Possible causes of lower GI bleeding include:  · Hemorrhoids  · Anal fissures  · Diverticulitis  · Inflammatory bowel disease (Crohn's disease or ulcerative colitis)  · Polyps (growths) in the intestine  Note: Iron supplements and medicines for diarrhea or upset stomach can cause black stools. Foods such as licorice and red beets can also discolor the stool and be mistaken for bleeding. These are not bleeding and are not a cause for alarm.  Home care  You have not lost a large amount of blood and your condition appears stable at this time. You may resume normal activity as long as you feel well.  Avoid NSAIDs, such as aspirin, ibuprofen, or naproxen. They can irritate the stomach and cause further bleeding. If you are taking these medicines for other medical reasons, talk to your healthcare provider before you stop them.   Follow-up care  Follow up with your healthcare provider as advised. Further tests may be needed to find the cause of your bleeding.  When to seek medical advice  Call your healthcare provider for any of the following:  · Large amount of rectal bleeding   · Increasing abdominal pain  · Weakness, dizziness  Call 911  Get emergency medical care if any of the following occur:  · Loss of consciousness  · Vomiting blood  Date Last Reviewed: 6/24/2015  © 2865-2694 [employer]. [address] [place] [address]. All rights reserved. This information is not intended as a substitute for professional medical care. Always follow your  healthcare professional's instructions.

## 2018-02-05 NOTE — PROGRESS NOTES
[facility] to let us know that patient wife alerted them about having INR drawn today ,but they didn't have any orders.  I have faxed over orders . Also reports that patient has already had his dose of Vancomycin today .  wants to know if INR can be drawn 2/6 so they wouldn't have to stick patient twice today 2/5.  Nurse can be reached at [phone]

## 2018-02-06 NOTE — TELEPHONE ENCOUNTER
Infectious Disease - Lab follow up     Dx:  Staph Epi bacteremia in patient with VAD and ICD  Antibiotics: IV vancomycin  1500 q 24 hours.    Estimated End Date: 2/19/18    WBC - 5.6  H/H - 9.4/29  Platelets -   Creatinine - .81  ESR - 31  CRP - 3.3  AST/ALT - 23/19  Vancomycin Trough - 14.2    Will hold vancomycin steady at this dose.   Has follow up with CARLIN Ramirez on 2/7

## 2018-02-06 NOTE — PROGRESS NOTES
Verbal result taken from [facility]_________. PT/INR _(2.6) -  pt(27.8)______ Date drawn_02/05/18______ Hardcopy to be faxed.

## 2018-02-06 NOTE — PROGRESS NOTES
Verbal result was given by Andrea with [facility] dated today 2/06/18 as: INR -2.6 / PT -27.4, INR is same as 2/05, hard copy to be faxed

## 2018-02-06 NOTE — PROGRESS NOTES
Wife was called and given lab result, reports no changes, nothing new, appetite good, verified coumadin: 5mg daily, INR was called in to coumadin clinic this am due to result not being received 2/05

## 2018-02-07 PROBLEM — R78.81 BACTEREMIA: Status: RESOLVED | Noted: 2017-08-18 | Resolved: 2018-01-01

## 2018-02-07 NOTE — PROGRESS NOTES
Date Consent signed: [id number] Addendum    Sponsor: [employer]    Study Title/IRB Number: [facility] 3 CAP [id number]    Principle Investigator: Dr. Sunny Downing    Present for Discussion: Edwige Will Suman Hayden     Is LAR Consenting for Subject: No    Prior to the Informed Consent (IC) being signed, or any study protocol required data collection, testing, procedure, or intervention being performed, the following was done and/or discussed:   Patient was given a copy of the IC for review    Purpose of the study and qualifications to participate    Study design, Follow up schedule, and tests or procedures done at each visit   Confidentiality and HIPAA Authorization for Release of Medical Records for the research trial/ subject's rights/research related injury   Participation in the research trial is voluntary and patient may withdraw at anytime   Contact information for study related questions    Patient verbalizes understanding of the above: Yes  Contact information for CRC and PI given to patient: Yes  Patient able to adequately summarize: the purpose of the study, the risks associated with the study, and all procedures, testing, and follow-ups associated with the study: Yes    Mr. Hayden signed the informed consent form addendum for the [facility] 3 [facility] research study with an IRB approval date of 1/15/2018.  Each page of the consent form was reviewed with the patient (and pts family) and all questions answered satisfactorily. Mr. Hayden signed the consent form and received a copy of same. The original consent was scanned into electronic medical records (EPIC) and filed into the subject's research study binder.

## 2018-02-07 NOTE — LETTER
February 7, 2018        Joe Ernst  [address] [place] [address]  Phone: [phone]  Fax: [phone]             [facility] [address] [place] [address]  Phone: [phone]   Patient: Suman Hayden   MR Number: [id number]   YOB: 1950   Date of Visit: 2/7/2018       Dear Dr. Joe Ernst    Thank you for referring Suman Hayden to me for evaluation. Attached you will find relevant portions of my assessment and plan of care.    If you have questions, please do not hesitate to call me. I look forward to following Suman Hayden along with you.    Sincerely,    Deejay Duff Jr, MD    Enclosure    If you would like to receive this communication electronically, please contact [email or web address] or [phone] to request [employer] access.    [employer] is a tool which provides read-only access to select patient information with whom you have a relationship. Its easy to use and provides real time access to review your patients record including encounter summaries, notes, results, and demographic information.    If you feel you have received this communication in error or would no longer like to receive these types of communications, please e-mail [email or web address]

## 2018-02-07 NOTE — PROCEDURES
TXP SHERRI INTERROGATIONS 2/7/2018 1/30/2018 1/29/2018 1/29/2018 1/25/2018 1/25/2018 1/24/2018   Type HeartMate3 - - - - - -   Flow 3.6 - - - - - -   Speed 5200 - - - - - -   PI 5.9 - - - - - -   Power ([place]) 3.6 - - - - - -   LSL 4800 - - - - - -   Pulsatility Pulse Pulse Pulse Pulse No Pulse Pulse Intermittent pulse   }

## 2018-02-07 NOTE — PROGRESS NOTES
Study: [employer] 3 CAP  Sponsor: [employer]  Follow-up Visit: 6 month  Date of Visit: 7Feb2018    Patient wishes to continue in study: Yes  All study protocol required CRFs completed: Yes    Mr. Hayden is here for the 6 month follow up visit. Current list of medications obtained and verified; he reports that he has been admitted several times since previous study follow-up. All questions answered to his satisfaction and he will contact research staff if he has any questions or concerns. Next follow up visit is in 6 months.     The following tests and procedures are related to research study:  Labs, Echo, chest x ray, VAD clinic visit

## 2018-02-07 NOTE — PROGRESS NOTES
Subjective:      Patient ID: Suman Hayden is a 67 y.o. male.    Chief Complaint:Hospital Follow Up      History of Present Illness    {ID HPI BLOCKS:38498}    Review of Systems   Constitution: Negative for chills, decreased appetite, fever, weakness, malaise/fatigue, night sweats, weight gain and weight loss.   HENT: Negative for congestion, ear pain, hearing loss, hoarse voice, sore throat and tinnitus.    Eyes: Negative for blurred vision, redness and visual disturbance.   Cardiovascular: Negative for chest pain, leg swelling and palpitations.   Respiratory: Positive for cough. Negative for hemoptysis, shortness of breath and sputum production.    Hematologic/Lymphatic: Negative for adenopathy. Does not bruise/bleed easily.   Skin: Negative for dry skin, itching, rash and suspicious lesions.   Musculoskeletal: Negative for back pain, joint pain, myalgias and neck pain.   Gastrointestinal: Negative for abdominal pain, constipation, diarrhea, heartburn, nausea and vomiting.   Genitourinary: Negative for dysuria, flank pain, frequency, hematuria, hesitancy and urgency.   Neurological: Negative for dizziness, headaches, numbness and paresthesias.   Psychiatric/Behavioral: Negative for depression and memory loss. The patient does not have insomnia and is not nervous/anxious.      Objective:   Physical Exam  Assessment:       1. Staphylococcus epidermidis bacteremia    2. AICD (automatic cardioverter/defibrillator) present    3. LVAD (left ventricular assist device) present          Plan:       ***

## 2018-02-07 NOTE — PROGRESS NOTES
"Date of Implant with Heartmate 3 LVAD: 17    PATIENT ARRIVED IN CLINIC:  Ambulatory  Accompanied by:wife    Vitals  Doppler:90  Pulsatile:yes  Temperature, oral: 98.3  PAIN:0 on 0-10 pain scale,   location of pain:   na,   description of pain:  na  Is patient currently on medications for pain?no   What kind? na  Does this help relieve the pain? na    VAD Interrogation:  TXP SHERRI INTERROGATIONS 2018   Type HeartMate3   Flow 3.6   Speed 5200   PI 5.9   Power ([place]) 3.6   LSL 4800   Pulsatility Pulse     History Lo.c3e  HCT:26.8    Problems / Issues / Alarms with VAD if any:1 no external power alarm noted yesterday at 1800.  Pt states he accidentally did it.    Any Equipment Issues? (Refer to equipment coordinators note for complete details):Na  Emergency Equipment With Patient:yes   VAD Binder With Patient: yes   Reviewed VAD Numbers In Binder:yes  VAD Sounds: HM3 sound smooth  LVAD Dressing/DLES:  Appearance Of Driveline:"1"  Antibiotics:YES, but for blood not DLES.  Managed by ID.  Due to end 18  Velour:no  Frequency of Dressing Changes:daily with soap and water   Stabilization Device In Use: YES delgado pepe    Manual & Visual Inspection Of Driveline:  NO KINKS OR TEARS NOTED   Modular Cable Connection Intact(no yellow exposed): YES   Attempted to unscrew modular cable to ensure it will be able to come lose in the event we ever need to change the modular cable while pt held the driveline in place so it would not move. Modular cable connection able to be unscrewed and re-tightened.  Instructed pt to perform this weekly.     Pt In Need Of Management Kits?:no   It is medically necessary to have VAD management kits in order to prevent infection or to assist in the healing of an infected DLES.    Assessment:   Complaints/reason for visit today: Routine follow up  Complaints Of Nausea / Vomiting:denies   Appearance and Frequency Of Stools:normal, denies blood  Patient reports urine is clear and " yellow:  YES     Coping/Depression/Anxiety:denies  Sleep Habits:12 hrs /night  Sleep Aids:no  Showering :NO, Pt given permission to shower today. Educated them on the proper process, rotating shower bags, using  Glad press and seal, not to shower if irritation of driveline infection and to perform dressing immediately after shower and drying.  Pt and wife verbalized understanding and agreement.  Activity/Exercise:walking stairs daily   Driving:no, Reminded to pull over should there be an alarm before looking down at controller.     Labs:    Chemistry        Component Value Date/Time     02/07/2018 0805     01/15/2018    K 3.9 02/07/2018 0805    K 3.8 01/15/2018     02/07/2018 0805     01/15/2018    CO2 28 02/07/2018 0805    CO2 25 01/15/2018    BUN 17 02/07/2018 0805    BUN 18 01/15/2018    CREATININE 1.0 02/07/2018 0805    CREATININE 0.9 01/15/2018    GLU 71 02/07/2018 0805        Component Value Date/Time    CALCIUM 9.1 02/07/2018 0805    CALCIUM 8.8 01/15/2018    ALKPHOS 87 02/07/2018 0805    AST 19 02/07/2018 0805    AST 17 01/15/2018    ALT 17 02/07/2018 0805    ALT 14 01/15/2018    BILITOT 0.3 02/07/2018 0805    ESTGFRAFRICA >60.0 02/07/2018 0805    EGFRNONAA >60.0 02/07/2018 0805            Magnesium   Date Value Ref Range Status   02/07/2018 1.6 1.6 - 2.6 mg/dL Final       Lab Results   Component Value Date    WBC 5.47 02/07/2018    HGB 8.4 (L) 02/07/2018    HCT 26.8 (L) 02/07/2018    MCV 89 02/07/2018     02/07/2018       Lab Results   Component Value Date    INR 2.6 (H) 02/07/2018    INR 2.6 02/06/2018    INR 2.6 02/05/2018       BNP   Date Value Ref Range Status   02/07/2018 849 (H) 0 - 99 pg/mL Final     Comment:     Values of less than 100 pg/ml are consistent with non-CHF populations.   01/31/2018 539 (H) 0 - 99 pg/mL Final     Comment:     Values of less than 100 pg/ml are consistent with non-CHF populations.   01/29/2018 917 (H) 0 - 99 pg/mL Final     Comment:      Values of less than 100 pg/ml are consistent with non-CHF populations.       LD   Date Value Ref Range Status   02/07/2018 170 110 - 260 U/L Final     Comment:     Results are increased in hemolyzed samples.   01/31/2018 146 110 - 260 U/L Final     Comment:     Results are increased in hemolyzed samples.   01/30/2018 143 110 - 260 U/L Final     Comment:     Results are increased in hemolyzed samples.       Labs reviewed with patient: YES, copy given to pt and wife     Patient Satisfaction Survey completed per patient: no  (explained about signature and box to check)  Medication reconciliation: per MA.  Purple card updated today: yes  Coumadin Managed by: [facility], 2.6 today.     Education: Reviewed driveline care, emergency procedures, alarms with patient.  Reminded patient never to change the controller without paging the VAD coordinator first.   Also reviewed how to get in touch with a VAD coordinator in the event of an emergency.     Plans/Needs:Pt due for lipid panel, Dr. Gama to place order. Permission given to shower. Once vanc ends, no oral abx. ID correlating to have PICC pulled in [place] if possible. BMP and BNP in 1-2 weeks.  Continue with CBCs every Monday.  Refer to MD notes.

## 2018-02-07 NOTE — PROGRESS NOTES
"Subjective:   Patient ID:  Suman Hayden is a 67 y.o. male who presents for LVAD followup visit.    Implant Date:  7/27/17  VAD Type : HM III   VAD speed is at 5200  rpm.   Implanted for : DT      Low voltage alarm noted   BP is    Doppler   90  (Pulsatile )   Map is   DLES is a "1".      INR is therapeutic at 2.6 .   LDh is at baseline 170   TXP SHERRI INTERROGATIONS 2/7/2018   Type HeartMate3   Flow 3.6   Speed 5200   PI 5.9   Power ([place]) 3.6   LSL 4800   Pulsatility Pulse       HPI  Mr Hayden is a pleasant 67 year old gentleman with a past medical history of chronic systolic heart failure secondary to non ischemic cardiomyopathy ( EF  10%  ) supported by a Heartmate  3 LVAD implanted as DT therapy 7/27/17. His post implant course was complicated by pocket hematoma from ICD, as well as bacteremia ( Staph EPI ) on suppressive vancomycin. His course has been further complicated by GI bleeding due to ilieal bleed( likely NSAID induced)  That was APC'ed using double balloon enteroscopy by Dr. Chatterjee (12/17). His aspirin had been stopped at that time and PPI increased to BID . He was recently admitted 1/25/17 where he had a double balloon enteroscopy without active bleed , but with ulceration, and subsequent retrograde double balloon enteroscopy on 1/26/18 which was unrevealing.     He returns to clinic for routine follow up . In the interm since his discharge he has done well he reports gaining weight, maybe about 10 lbs in the last month. He  States that he feels well , no shortness breath at all. Hes been walking  With his Children, has been active. No edema.      Complaints of ankle instability and "flat footedness" . This has been chronic. Would like to see podiatry. No dark or  Bloody stools          Review of Systems   Constitution: Positive for weight gain. Negative for chills, decreased appetite, fever and weakness.   Cardiovascular: Negative for chest pain, dyspnea on exertion, irregular heartbeat, leg " "swelling, orthopnea, palpitations, paroxysmal nocturnal dyspnea and syncope.   Respiratory: Negative for cough, shortness of breath, sputum production and wheezing.    Skin: Negative for poor wound healing.   Gastrointestinal: Negative for bloating, abdominal pain, constipation, diarrhea, nausea and vomiting.   Psychiatric/Behavioral: Negative for altered mental status.       Objective:   Blood pressure (!) 90/0, pulse 77, temperature 98.3 °F (36.8 °C), temperature source Oral, height 5' 6" (1.676 m), weight 77.1 kg (170 lb).body mass index is 27.44 kg/m².    Doppler: 90    Physical Exam   Constitutional: He is oriented to person, place, and time. He appears well-developed and well-nourished.   HENT:   Head: Normocephalic and atraumatic.   Eyes: Pupils are equal, round, and reactive to light.   Neck: Neck supple. JVD present. No tracheal deviation present.   Cardiovascular: Normal rate and regular rhythm.  Exam reveals no gallop and no friction rub.    No murmur heard.  Smooth HMIII LVAD hum    Pulmonary/Chest: Effort normal and breath sounds normal. No respiratory distress. He has no wheezes. He exhibits no tenderness.   Abdominal: Soft. Bowel sounds are normal. He exhibits no distension. There is no tenderness. There is no rebound and no guarding.   Musculoskeletal: He exhibits no edema.   Neurological: He is alert and oriented to person, place, and time.   Skin: Skin is warm and dry. No erythema.   Psychiatric: He has a normal mood and affect.       Lab Results   Component Value Date    WBC 5.47 02/07/2018    HGB 8.4 (L) 02/07/2018    HCT 26.8 (L) 02/07/2018    MCV 89 02/07/2018     02/07/2018    CO2 28 02/07/2018    CREATININE 1.0 02/07/2018    CALCIUM 9.1 02/07/2018    BILIRUTOT 0.3 01/15/2018    ALBUMIN 2.7 (L) 02/07/2018    AST 19 02/07/2018     (H) 02/07/2018    ALT 17 02/07/2018     02/07/2018       Lab Results   Component Value Date    INR 2.6 (H) 02/07/2018    INR 2.6 02/06/2018    " INR 2.6 02/05/2018       BNP   Date Value Ref Range Status   02/07/2018 849 (H) 0 - 99 pg/mL Final     Comment:     Values of less than 100 pg/ml are consistent with non-CHF populations.   01/31/2018 539 (H) 0 - 99 pg/mL Final     Comment:     Values of less than 100 pg/ml are consistent with non-CHF populations.   01/29/2018 917 (H) 0 - 99 pg/mL Final     Comment:     Values of less than 100 pg/ml are consistent with non-CHF populations.       LD   Date Value Ref Range Status   02/07/2018 170 110 - 260 U/L Final     Comment:     Results are increased in hemolyzed samples.   01/31/2018 146 110 - 260 U/L Final     Comment:     Results are increased in hemolyzed samples.   01/30/2018 143 110 - 260 U/L Final     Comment:     Results are increased in hemolyzed samples.       Labs were reviewed with the patient.    No results found for this or any previous visit.  No results found for this or any previous visit.      Hbg:      Assessment:      1. Heart failure, unspecified heart failure chronicity, unspecified heart failure type    2. Heart replaced by heart assist device    3. Nonischemic cardiomyopathy    4. Essential hypertension        Plan:     Overall doing fantastic. He seems stable clinically from his GI bleeds ( no dark  Or blood stools )  And his hemoglobin too  Is relatively stable. Will continue to trend this weekly   Will refer to podiatry  For ankle instability   Saw infectious disease for Vanc, plans to discontinue per him at the end of the month   He was not discharged on Amlodipine 5mg , but has some at home, Will ask that he restarts this   A little overloaded today, JVD elevated with elevated BNP , will increase lasix to 40/40 , labs in 1 week      He had been previously deemed DT for Hep B core positivity as well as high PA pressures.  Can refer to ID for eval for Hep B core to weigh in on possibility of transplant.   If he  Is cleared from ID then would consider RHC for eval of PA pressures, which I  suspect would fall post VAD implant .            Patient is now NYHA I  Recommend 2 gram sodium restriction and 1500cc fluid restriction.  Encourage physical activity with graded exercise program.  Requested patient to weigh themselves daily, and to notify us if their weight increases by more than 3 lbs in 1 day or 5 lbs in 1 week.     Listed for transplant: No      RTC in 1 month     UNOS Patient Status  Functional Status: 100% - Normal, no complaints, no evidence of disease  Physical Capacity: No Limitations  Working for Income: Unknown

## 2018-02-07 NOTE — PROGRESS NOTES
Subjective:       Patient ID: Suman Hayden is a 67 y.o. male.    Chief Complaint: Hospital Follow Up    HPI:  67 year old male with NICM s/p LVAD 7/2017, HTN, DLD, recent ICD revision 11/20 with Dr. Winchester c/b pocket hematoma and recent GIB requiring transfusion follows up from recent hospitalization for staph epi bacteremia. On  1/5, patient spiked a fever to 103; blood cultures obtained and 4/4 bottles positive with staph epi; Repeat cultures 1/6 also positive;  DLES were without signs of infection.  Pacer pocket with slight swelling but no erythema, drainage, fluctuance. GLENN was obtained which was negative for pacer lead infection or endocarditis. The source was presumed likely from pacer pocket. The plan was to treat patient with 6 weeks of IV vancomycin (longer course given LVAD and ICD in place). Patient follows up today.     Labs from [facility] 2/6/18:  Vancomycin trough: 14.3  ESR- 31  CRP- 2.1  AST/ALT: 23/20  Creatinine- 0.86  K- 4.0  NA- 139  WBC- 5.4  H&H: 9.8/30.3    Review of Systems   Constitutional: Negative for chills, fatigue and fever.   Respiratory: Negative for cough and shortness of breath.    Cardiovascular: Negative for chest pain and leg swelling.   Gastrointestinal: Negative for abdominal pain, diarrhea, nausea and vomiting.   Skin: Negative for rash and wound.   Neurological: Negative for dizziness, weakness and numbness.       Objective:      Physical Exam   Constitutional: He is oriented to person, place, and time. He appears well-developed and well-nourished. No distress.   Cardiovascular: Normal rate and regular rhythm.    No murmur heard.  Pulmonary/Chest: Effort normal and breath sounds normal. No respiratory distress.   Abdominal: Soft. Bowel sounds are normal. He exhibits no distension.   Musculoskeletal: Normal range of motion. He exhibits no edema.   Neurological: He is alert and oriented to person, place, and time.   Skin: Skin is warm and dry.         Psychiatric: He has a normal mood and affect. His behavior is normal.       Assessment:       1. Staphylococcus epidermidis bacteremia    2. AICD (automatic cardioverter/defibrillator) present    3. LVAD (left ventricular assist device) present        Following up for staph epi bacteremia- source presumed pacer pocket. GLENN negative.   Pt is doing well; feels well. Tolerating antibiotics. vanc trough is therapeutic. Scheduled to complete antibiotics on 2/19.     Plan:       1. Continue vancomycin 1500 mg IV q 24 hours to complete a 6 week course (estimated end date: 2/19/18)  2. Continue to monitor weekly labs: CMP, CBC, vanc trough with results faxed to our clinic  3. After completion of 6 weeks of IV vancomycin, will plan to d/c all antibiotics on 2/19 and monitor off of abx; if bacteremia recurs, may have to consider pacer extraction at that time  4. Will attempt to set up PICC line removal on 2/19 in [facility]  5. Will f/u with patient at next VAD appt   6. Instructed pt to follow up sooner for worsening issues/problems such as fevers, chills, drainage.

## 2018-02-08 NOTE — PROGRESS NOTES
I have seen the patient, reviewed the fellow's assessment and plan. I have personally interviewed and examined the patient at bedside and: agree with the findings.

## 2018-02-12 NOTE — TELEPHONE ENCOUNTER
Spoke with wife, pt has an appt with Dr. Ash on 2/23/18 at [facility] however pt is scheduled for device check on 2/22/18.  Wife wants to know if both appts can be on Feb 23rd.  Moved MDT ICD check to 9am, [facility] followed by [facility] visit at 9:20am. Wife verbalized understanding.

## 2018-02-12 NOTE — TELEPHONE ENCOUNTER
----- Message from Ni Hawkins MA sent at 2/12/2018  9:34 AM CST -----  Contact: Pt/Wife      ----- Message -----  From: Markus Herman  Sent: 2/12/2018   9:22 AM  To: [facility] NICE Staff    Please give pt wife a call at [phone] regarding the upcoming appt the pt has.

## 2018-02-14 NOTE — TELEPHONE ENCOUNTER
Received voicemail regarding dressing supplies. Explained we can only order in clinic. Pt wife was surprised that she had to tell us this in clinic. Discussed the process of how dressing changes are ordered. Wife explains that the split gauze that comes in the kit sticks to his DLES and she has been using scissors to cut it off. Explained that dangers of doing this, including infection or cutting of DLES. Verbalized understanding. Told her to take the list of the select supplies she needs and go to local medical supply or pharmacy.

## 2018-02-14 NOTE — TELEPHONE ENCOUNTER
Infectious Disease - Lab follow up     Dx: staph epi bacteremia in setting AICD/LVAD secondary to pocket infection  Antibiotics: vancomycin 1500 mg IV q 24 hours  Estimated End Date: 2/19/18    WBC - 5.3  Platelets - 152  Creatinine - 0.93  ESR - 38  CRP - 5.7  AST/ALT -   Vancomycin Trough - 16.6

## 2018-02-15 NOTE — PROGRESS NOTES
Wife was given lab result, verified correct dose of coumadin, reports no changes, Wife was advised of coumadin instructions and redraw date, order faxed to [facility]

## 2018-02-19 PROBLEM — E78.00 PURE HYPERCHOLESTEROLEMIA: Status: ACTIVE | Noted: 2018-01-01

## 2018-02-19 NOTE — PROGRESS NOTES
Subjective:   Patient ID:  Suman Hayden is a 67 y.o. male who presents for LVAD followup visit.    Implant Date:7/27/17  Initials:HJB     HM 3 RPM: 5200  [place] ID [id number]     INR goal: 2-3   Bridge with Heparin   Antiplatelets: ASA stopped on 12/22/17 after GI bleed and ileal ulcer      GI Bleed: 8/17, 12/17, 1/18  Integrelin/TPA:  Stroke: na    TXP SHERRI INTERROGATIONS 2/19/2018   Type HeartMate3   Flow 3.9   Speed 5200   PI 4.5   Power ([place]) 3.6   LSL 4800   Pulsatility Pulse       HPI  67 BM with chronic systolic heart failure secondary to non ischemic cardiomyopathy underwent Heartmate  3 LVAD implanted as DT therapy 7/27/17. He underwent ICD revision on 11/20/17 with Dr. Vidal (DC ICD placed with active RA/LV leads (RV lead capped during revision) that was complicated by pocket hematoma. In January 2018 he developed staph epid bacteremia.  Post-op course further complicated by GI bleeding due to ilieal bleed that was APC'ed using double balloon enteroscopy by Dr. Chatterjee Dec 2017. His aspirin was stopped 12/22/17 and PPI increased to BID . He was re-admitted 1/25/17 where he had a double balloon enteroscopy without active bleed , but with ulceration, and subsequent retrograde double balloon enteroscopy on 1/26/18 which was unrevealing.     He has been feeling great and extremely happy with ICD and improved QOL.  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  No tightness, pressure or heaviness in chest, neck, arms, throat, jaw or back with or without exertion.  No PEREIRA, orthopnea, PND, palpitations, pre-syncope or syncope.    Review of Systems   Constitution: Negative for chills, fever, weakness, malaise/fatigue, night sweats, weight gain and weight loss.   HENT: Negative for congestion, hearing loss, hoarse voice, nosebleeds and sore throat.    Eyes: Negative for double vision, pain, vision loss in left eye and vision loss in right eye.   Cardiovascular: Negative for  "chest pain, claudication, dyspnea on exertion, irregular heartbeat, leg swelling, near-syncope, orthopnea, palpitations, paroxysmal nocturnal dyspnea and syncope.   Respiratory: Negative for cough, hemoptysis, shortness of breath, sleep disturbances due to breathing, snoring, sputum production and wheezing.    Endocrine: Negative for cold intolerance, heat intolerance, polydipsia and polyuria.   Hematologic/Lymphatic: Negative for bleeding problem. Does not bruise/bleed easily.   Musculoskeletal: Negative for falls, muscle cramps, myalgias and neck pain.   Gastrointestinal: Negative for bloating, abdominal pain, change in bowel habit, constipation, diarrhea, hematochezia, jaundice, melena and nausea.   Genitourinary: Negative for bladder incontinence, dysuria, frequency, hematuria, hesitancy, incomplete emptying and urgency.   Neurological: Negative for brief paralysis, dizziness, focal weakness, headaches, numbness, paresthesias and seizures.   Psychiatric/Behavioral: Negative for depression and memory loss. The patient is not nervous/anxious.      Objective:   Temperature 98.3 °F (36.8 °C), temperature source Oral, height 5' 8" (1.727 m), weight 74.4 kg (164 lb).body mass index is 24.94 kg/m².  Doppler: 81 mm Hg  Physical Exam   Constitutional: He appears well-developed and well-nourished. No distress.   HENT:   Head: Normocephalic and atraumatic.   Eyes: Conjunctivae are normal. No scleral icterus.   Neck: No JVD present. No tracheal deviation present. No thyromegaly present.   Cardiovascular:   Normal VAD sounds; DL 1   Pulmonary/Chest: Effort normal and breath sounds normal. No respiratory distress. He has no wheezes. He has no rales.   Abdominal: Soft. Bowel sounds are normal. He exhibits no distension and no mass. There is no tenderness. There is no rebound and no guarding.   Musculoskeletal: He exhibits edema (mild). He exhibits no tenderness.   Neurological: He is alert.   Skin: Skin is warm and dry. He is " not diaphoretic.   Psychiatric: He has a normal mood and affect. His behavior is normal. Judgment and thought content normal.     Lab Results   Component Value Date     (H) 02/19/2018     02/19/2018    K 4.1 02/19/2018    MG 1.9 02/19/2018     02/19/2018    CO2 24 02/19/2018    BUN 16 02/19/2018    CREATININE 1.2 02/19/2018    GLU 89 02/19/2018    AST 30 02/19/2018    ALT 23 02/19/2018    ALBUMIN 3.3 (L) 02/19/2018    ALBUMIN 2.8 01/15/2018    PROT 7.2 02/19/2018    BILITOT 0.6 02/19/2018    WBC 5.61 02/19/2018    HGB 9.2 (L) 02/19/2018    HCT 29.5 (L) 02/19/2018     (L) 02/19/2018    INR 2.7 (H) 02/19/2018    INR 2.2 02/15/2018     02/19/2018    TSH 2.481 07/07/2017    CHOL 128 02/19/2018    HDL 60 02/19/2018    LDLCALC 55.6 (L) 02/19/2018    TRIG 62 02/19/2018   BNP improved, LDH unchanged  Assessment:      1. Heart replaced by heart assist device    2. Chronic systolic congestive heart failure    3. Essential hypertension    4. Gastrointestinal hemorrhage, unspecified gastrointestinal hemorrhage type    5. Acute posthemorrhagic anemia    6. AICD (automatic cardioverter/defibrillator) present    7. Nonischemic cardiomyopathy    8. Pure hypercholesterolemia    9. Anticoagulation monitoring, INR range 2-3        Plan:   Diuresis, wt loss, low sodium diet and fluid restriction reviewed along with daily weights and how to respond to 3# weight gain with prn diuretics.  If this fails to restore dry weight call us within 2-3 days.     Same treatment and routine f/u     Patient is now NYHA I    Listed for transplant: No    UNOS Patient Status  Functional Status: 100% - Normal, no complaints, no evidence of disease  Physical Capacity: No Limitations  Working for Income: No  If no, reason not working: Patient Choice - Retired

## 2018-02-19 NOTE — PROCEDURES
TXP SHERRI INTERROGATIONS 2/19/2018 2/7/2018 1/30/2018 1/29/2018 1/29/2018 1/25/2018 1/25/2018   Type HeartMate3 HeartMate3 - - - - -   Flow 3.9 3.6 - - - - -   Speed 5200 5200 - - - - -   PI 4.5 5.9 - - - - -   Power ([place]) 3.6 3.6 - - - - -   LSL 4800 4800 - - - - -   Pulsatility Pulse Pulse Pulse Pulse Pulse No Pulse Pulse   }

## 2018-02-19 NOTE — PROGRESS NOTES
Pt here for picc line d/c. Pt instructed to keep pressure dressing on for 24hrs then to cover site with a band aid until healed . Pt tolerated well and left in NAD

## 2018-02-23 PROBLEM — T82.837A ICD (IMPLANTABLE CARDIOVERTER-DEFIBRILLATOR) POCKET HEMATOMA: Status: RESOLVED | Noted: 2017-11-30 | Resolved: 2018-01-01

## 2018-02-23 NOTE — PROGRESS NOTES
Subjective:    Patient ID:  Suman Hayden is a 67 y.o. male who presents for eval of LVADAtrial Fibrillation; Hypertension; and Congestive Heart Failure      HPI  67 BM with chronic systolic heart failure secondary to non ischemic cardiomyopathy underwent Heartmate  3 LVAD implanted as DT therapy 7/27/17. He underwent ICD revision on 11/20/17 by me (DC ICD placed with active RA/LV leads (RV lead capped during revision) that was complicated by pocket hematoma. In January 2018 he developed staph epid bacteremia.  Post-op course further complicated by GI bleeding due to ilieal bleed that was APC'ed using double balloon enteroscopy by Dr. Chatterjee Dec 2017. His aspirin was stopped 12/22/17 and PPI increased to BID . He was re-admitted 1/25/17 where he had a double balloon enteroscopy without active bleed , but with ulceration, and subsequent retrograde double balloon enteroscopy on 1/26/18 which was unrevealing.      He has been feeling great and extremely happy with ICD and improved QOL.  He [place] [employer] [place] few [place], fishes and then walks back.  No tightness, pressure or heaviness in chest, neck, arms, throat, jaw or back with or without exertion.  No PEREIRA, orthopnea, PND, palpitations, pre-syncope or syncope.  Overall, has been doing well. He has added some wt and he seems to be in good spirits. He has been going fishing etc. He has had no ICD shocks as of late.    ICD eval today >> A flutter with good rate control, a few runs of VT -- one that lasted hours -- ASxc -- ICD ignored them -- in monitoring zone  Current Outpatient Prescriptions on File Prior to Visit   Medication Sig Dispense Refill    amLODIPine (NORVASC) 5 MG tablet Take 5 mg by mouth once daily. Take one tablet 5mg by mouth once a day.      dronabinol (MARINOL) 5 MG capsule Take 1 capsule (5 mg total) by mouth 3 (three) times daily before meals. 90 capsule 5    furosemide (LASIX) 20 MG tablet Take 1 tablet (20 mg total) by  mouth 2 (two) times daily. 60 tablet 11    furosemide (LASIX) 40 MG tablet       lisinopril (PRINIVIL,ZESTRIL) 5 MG tablet Take 1 tablet (5 mg total) by mouth once daily. 90 tablet 3    magnesium oxide (MAG-OX) 400 mg tablet Take 1 tablet (400 mg total) by mouth 2 (two) times daily. 60 tablet 11    ondansetron (ZOFRAN-ODT) 4 MG TbDL Take 4 mg by mouth every 6 (six) hours as needed.       pantoprazole (PROTONIX) 40 MG tablet Take 1 tablet (40 mg total) by mouth 2 (two) times daily before meals. 60 tablet 11    polyethylene glycol (GLYCOLAX) 17 gram/dose powder Take 17 g by mouth daily as needed. 1700 g 3    potassium chloride (MICRO-K) 10 MEQ CpSR Take 2 capsules (20 mEq total) by mouth once daily. 60 capsule 11    pravastatin (PRAVACHOL) 20 MG tablet Take 1 tablet (20 mg total) by mouth every evening. 30 tablet 11    warfarin (COUMADIN) 5 MG tablet Take 5mg orally daily 32 tablet 5     No current facility-administered medications on file prior to visit.      He is not on any anti Arrh meds  Review of Systems   Constitution: Positive for chills. Negative for decreased appetite, diaphoresis, fever, weakness, malaise/fatigue, night sweats, weight gain and weight loss.   HENT: Negative.    Eyes: Negative.  Negative for blurred vision.   Cardiovascular: Negative.  Negative for chest pain, claudication, cyanosis, dyspnea on exertion, irregular heartbeat, leg swelling, near-syncope, orthopnea and palpitations.   Respiratory: Negative.  Negative for cough, shortness of breath, sleep disturbances due to breathing, snoring and wheezing.    Endocrine: Negative for heat intolerance.   Hematologic/Lymphatic: Does not bruise/bleed easily.   Skin: Negative.    Musculoskeletal: Positive for stiffness. Negative for arthritis, back pain, falls, gout, joint pain, joint swelling, muscle cramps, muscle weakness, myalgias and neck pain.   Gastrointestinal: Negative.  Negative for melena, nausea and vomiting.   Genitourinary:  Negative for nocturia.   Neurological: Negative.  Negative for excessive daytime sleepiness, dizziness, headaches and light-headedness.   Psychiatric/Behavioral: Negative for depression, memory loss and substance abuse. The patient does not have insomnia and is not nervous/anxious.         Objective:    Physical Exam   Constitutional: He is oriented to person, place, and time. He appears well-developed and well-nourished.   HENT:   Head: Normocephalic and atraumatic.   Right Ear: External ear normal.   Left Ear: External ear normal.   Eyes: Conjunctivae are normal. Pupils are equal, round, and reactive to light. Left conjunctiva is not injected. Left conjunctiva has no hemorrhage.   Neck: Neck supple. No JVD present. No thyromegaly present.   Cardiovascular: Normal rate, regular rhythm and intact distal pulses.  PMI is not displaced.  Exam reveals no gallop, no friction rub, no midsystolic click and no opening snap.    No murmur heard.  Pulses:       Carotid pulses are 2+ on the right side, and 2+ on the left side.       Radial pulses are 2+ on the right side, and 2+ on the left side.        Dorsalis pedis pulses are 2+ on the right side, and 2+ on the left side.        Posterior tibial pulses are 2+ on the right side, and 2+ on the left side.   LVAD sounds   Pulmonary/Chest: Effort normal and breath sounds normal. No respiratory distress. He has no wheezes. He has no rales. He exhibits no tenderness.   Device pocket is in excellent repair     Abdominal: Soft. Normal appearance. He exhibits no pulsatile liver. There is no hepatomegaly. There is no tenderness. There is no rigidity.   LVAD lines are clean   Musculoskeletal: Normal range of motion. He exhibits no edema or tenderness.        Right knee: He exhibits no swelling.        Left knee: He exhibits no swelling.        Right ankle: He exhibits no swelling.        Left ankle: He exhibits no swelling.        Right lower leg: He exhibits no swelling.        Left  "lower leg: He exhibits no swelling.        Right foot: There is no swelling.        Left foot: There is no swelling.   Trace pretibial edema   Neurological: He is alert and oriented to person, place, and time. He has normal strength and normal reflexes. No cranial nerve deficit. Coordination normal.   Skin: Skin is warm, dry and intact. No rash noted. No cyanosis.   Psychiatric: He has a normal mood and affect. His behavior is normal.   Nursing note and vitals reviewed.  BP (!) 84/0 Comment: doppler  Pulse 78   Ht 5' 8" (1.727 m)   Wt 75 kg (165 lb 5.5 oz)   BMI 25.14 kg/m²        Assessment:     Currently no apparent need to Rx A Flutter or VT -- if becomes Sxc with A Flutter despite LVAD support, would look for feasibility of RFA (cannot tell morph on ECG but ACL stable by AEGMs) and would consider a R isded procedure as needed.   1. LVAD (left ventricular assist device) present    2. Nonischemic cardiomyopathy    3. Staphylococcus epidermidis bacteremia    4. V-tach    5. Hematoma of implantable cardioverter-defibrillator (ICD) pocket, subsequent encounter    6. Subtherapeutic international normalized ratio (INR)    7. Gastrointestinal hemorrhage, unspecified gastrointestinal hemorrhage type    8. AICD (automatic cardioverter/defibrillator) present    9. Chronic systolic congestive heart failure    10. Essential hypertension         Plan:       No orders of the defined types were placed in this encounter.    Follow-up if symptoms worsen or fail to improve.  There are no discontinued medications.  New Prescriptions    No medications on file     Modified Medications    No medications on file                   "

## 2018-02-23 NOTE — PROGRESS NOTES
Wife reports the Patient is rotating between Ensure/Boost/Nepro -1-2 per day, has gained a little weight, wife verified correct dose of coumadin, reports activity a little better, has Podiatry appt. Next week, wife also reports INR was drawn by fingerstick

## 2018-02-26 NOTE — PROGRESS NOTES
Wife was given lab result, verified correct dose of coumadin, reports no changes in meds/diet/appetite, reports no bleeding/bruising/swelling, Wife was advised of coumadin instructions and redraw date, [facility]

## 2018-02-28 NOTE — PROGRESS NOTES
Wife questioned and confirmed correct dose   No other changes reported (bleeding, bruising, diaherria, etc)

## 2018-03-02 NOTE — PROGRESS NOTES
Ebony with [facility] called to report patient has had lab drawn twice this week and tried to draw an INR again today but reports that she was unsuccessful 3 times in getting any blood, Ebony was inquiring if a fingerstick machine could be used or if it was ok to wait until Monday to try again since he has other labs being drawn then. As per YOVANY Christine -Monica asked Ebony to please get a fingerstick today and further orders would be faxed to the office once today's result is received.

## 2018-03-05 NOTE — TELEPHONE ENCOUNTER
Received CBC from outside lab. Hb 6.7 and Hct 20.7. Called and spoke with patient's wife. Patient's wife states that patient has no s/s of GI bleed and feels fine at this time. Per Dr. Torres, patient to come to [facility] for admission if he feels ok, and to report to  [facility] if not. Patient's wife states he feels good so they will drive to [facility] for admission. Admission called to admission office.

## 2018-03-06 PROBLEM — T82.110A: Status: RESOLVED | Noted: 2017-07-18 | Resolved: 2018-01-01

## 2018-03-06 PROBLEM — B95.7 STAPHYLOCOCCUS EPIDERMIDIS BACTEREMIA: Status: RESOLVED | Noted: 2018-01-01 | Resolved: 2018-01-01

## 2018-03-06 PROBLEM — Z45.02 DEFIBRILLATOR DISCHARGE: Status: RESOLVED | Noted: 2017-11-29 | Resolved: 2018-01-01

## 2018-03-06 PROBLEM — R78.81 STAPHYLOCOCCUS EPIDERMIDIS BACTEREMIA: Status: RESOLVED | Noted: 2018-01-01 | Resolved: 2018-01-01

## 2018-03-06 NOTE — SUBJECTIVE & OBJECTIVE
Past Medical History:   Diagnosis Date    Arthritis     Cardiomyopathy     CHF (congestive heart failure)     Coronary artery disease     Encounter for blood transfusion     Hyperlipidemia     Hypertension     Hypotension, iatrogenic     Obesity     S/P implantation of automatic cardioverter/defibrillator (AICD)     Ventricular tachycardia        Past Surgical History:   Procedure Laterality Date    ABDOMINAL SURGERY      CARDIAC CATHETERIZATION      CARDIAC DEFIBRILLATOR PLACEMENT      LEFT VENTRICULAR ASSIST DEVICE  07/27/2017       Review of patient's allergies indicates:   Allergen Reactions    Pneumococcal 23-killian ps vaccine      Family History     Problem Relation (Age of Onset)    Heart disease Mother, Father        Social History Main Topics    Smoking status: Never Smoker    Smokeless tobacco: Never Used    Alcohol use No    Drug use: No    Sexual activity: Not on file     Review of Systems   Constitutional: Negative for chills and fever.   HENT: Negative for congestion and sore throat.    Eyes: Negative for discharge and visual disturbance.   Respiratory: Positive for shortness of breath. Negative for cough.    Cardiovascular: Negative for chest pain.   Gastrointestinal: Negative for abdominal distention, abdominal pain, constipation, diarrhea, nausea and vomiting.   Genitourinary: Negative for dysuria and frequency.   Musculoskeletal: Negative for joint swelling and myalgias.   Skin: Negative for rash.   Neurological: Negative for dizziness and light-headedness.     Objective:     Vital Signs (Most Recent):  Temp: 98.7 °F (37.1 °C) (03/06/18 0607)  Pulse: 73 (03/06/18 0607)  Resp: 18 (03/06/18 0607)  BP: (!) 78/0 (03/06/18 0607)  SpO2: 98 % (03/06/18 0607) Vital Signs (24h Range):  Temp:  [98.2 °F (36.8 °C)-98.7 °F (37.1 °C)] 98.7 °F (37.1 °C)  Pulse:  [48-99] 73  Resp:  [16-18] 18  SpO2:  [98 %-99 %] 98 %  BP: (72-96)/(0-54) 78/0     Weight: 70.1 kg (154 lb 8.7 oz) (03/05/18  2000)  Body mass index is 22.82 kg/m².      Intake/Output Summary (Last 24 hours) at 03/06/18 0815  Last data filed at 03/06/18 0500   Gross per 24 hour   Intake            777.5 ml   Output              600 ml   Net            177.5 ml       Lines/Drains/Airways     Line                 VAD 07/27/17 1312 Left ventricular assist device HeartMate 3 221 days          Peripheral Intravenous Line                 Peripheral IV - Single Lumen 03/05/18 2040 Right Forearm less than 1 day         Peripheral IV - Single Lumen 03/05/18 2102 Right Forearm less than 1 day                Physical Exam   Constitutional: He appears well-developed.   Very pleasant    HENT:   Head: Normocephalic and atraumatic.   Eyes: Conjunctivae and EOM are normal. Right eye exhibits no discharge. Left eye exhibits no discharge.   Neck: Normal range of motion.   Cardiovascular:   No murmur heard.  LVAD sounds   Pulmonary/Chest: Effort normal. No respiratory distress.   Abdominal: Soft. Bowel sounds are normal. He exhibits no distension. There is no tenderness.   Musculoskeletal: Normal range of motion.   Trace edema b/l LE   Neurological: He is alert.   Skin: Skin is warm and dry.       Significant Labs:  CBC:   Recent Labs  Lab 03/05/18 2037 03/06/18  0445   WBC 5.56 5.07  5.07   HGB 5.9* 6.6*  6.6*   HCT 19.1* 20.3*  20.3*    157  157     BMP:   Recent Labs  Lab 03/06/18  0445   GLU 73      K 4.3      CO2 22*   BUN 21   CREATININE 1.0   CALCIUM 9.1   MG 2.3       Significant Imaging:  Imaging results within the past 24 hours have been reviewed.

## 2018-03-06 NOTE — H&P (VIEW-ONLY)
Subjective:   Patient ID:  Suman Hayden is a 67 y.o. male who presents for LVAD followup visit.    Implant Date:7/27/17  Initials:HJB     HM 3 RPM: 5200  [place] ID [id number]     INR goal: 2-3   Bridge with Heparin   Antiplatelets: ASA stopped on 12/22/17 after GI bleed and ileal ulcer      GI Bleed: 8/17, 12/17, 1/18  Integrelin/TPA:  Stroke: na    TXP SHERRI INTERROGATIONS 2/19/2018   Type HeartMate3   Flow 3.9   Speed 5200   PI 4.5   Power ([place]) 3.6   LSL 4800   Pulsatility Pulse       HPI  67 BM with chronic systolic heart failure secondary to non ischemic cardiomyopathy underwent Heartmate  3 LVAD implanted as DT therapy 7/27/17. He underwent ICD revision on 11/20/17 with Dr. Vidal (DC ICD placed with active RA/LV leads (RV lead capped during revision) that was complicated by pocket hematoma. In January 2018 he developed staph epid bacteremia.  Post-op course further complicated by GI bleeding due to ilieal bleed that was APC'ed using double balloon enteroscopy by Dr. Chatterjee Dec 2017. His aspirin was stopped 12/22/17 and PPI increased to BID . He was re-admitted 1/25/17 where he had a double balloon enteroscopy without active bleed , but with ulceration, and subsequent retrograde double balloon enteroscopy on 1/26/18 which was unrevealing.     He has been feeling great and extremely happy with ICD and improved QOL.  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  No tightness, pressure or heaviness in chest, neck, arms, throat, jaw or back with or without exertion.  No PEREIRA, orthopnea, PND, palpitations, pre-syncope or syncope.    Review of Systems   Constitution: Negative for chills, fever, weakness, malaise/fatigue, night sweats, weight gain and weight loss.   HENT: Negative for congestion, hearing loss, hoarse voice, nosebleeds and sore throat.    Eyes: Negative for double vision, pain, vision loss in left eye and vision loss in right eye.   Cardiovascular: Negative for  "chest pain, claudication, dyspnea on exertion, irregular heartbeat, leg swelling, near-syncope, orthopnea, palpitations, paroxysmal nocturnal dyspnea and syncope.   Respiratory: Negative for cough, hemoptysis, shortness of breath, sleep disturbances due to breathing, snoring, sputum production and wheezing.    Endocrine: Negative for cold intolerance, heat intolerance, polydipsia and polyuria.   Hematologic/Lymphatic: Negative for bleeding problem. Does not bruise/bleed easily.   Musculoskeletal: Negative for falls, muscle cramps, myalgias and neck pain.   Gastrointestinal: Negative for bloating, abdominal pain, change in bowel habit, constipation, diarrhea, hematochezia, jaundice, melena and nausea.   Genitourinary: Negative for bladder incontinence, dysuria, frequency, hematuria, hesitancy, incomplete emptying and urgency.   Neurological: Negative for brief paralysis, dizziness, focal weakness, headaches, numbness, paresthesias and seizures.   Psychiatric/Behavioral: Negative for depression and memory loss. The patient is not nervous/anxious.      Objective:   Temperature 98.3 °F (36.8 °C), temperature source Oral, height 5' 8" (1.727 m), weight 74.4 kg (164 lb).body mass index is 24.94 kg/m².  Doppler: 81 mm Hg  Physical Exam   Constitutional: He appears well-developed and well-nourished. No distress.   HENT:   Head: Normocephalic and atraumatic.   Eyes: Conjunctivae are normal. No scleral icterus.   Neck: No JVD present. No tracheal deviation present. No thyromegaly present.   Cardiovascular:   Normal VAD sounds; DL 1   Pulmonary/Chest: Effort normal and breath sounds normal. No respiratory distress. He has no wheezes. He has no rales.   Abdominal: Soft. Bowel sounds are normal. He exhibits no distension and no mass. There is no tenderness. There is no rebound and no guarding.   Musculoskeletal: He exhibits edema (mild). He exhibits no tenderness.   Neurological: He is alert.   Skin: Skin is warm and dry. He is " not diaphoretic.   Psychiatric: He has a normal mood and affect. His behavior is normal. Judgment and thought content normal.     Lab Results   Component Value Date     (H) 02/19/2018     02/19/2018    K 4.1 02/19/2018    MG 1.9 02/19/2018     02/19/2018    CO2 24 02/19/2018    BUN 16 02/19/2018    CREATININE 1.2 02/19/2018    GLU 89 02/19/2018    AST 30 02/19/2018    ALT 23 02/19/2018    ALBUMIN 3.3 (L) 02/19/2018    ALBUMIN 2.8 01/15/2018    PROT 7.2 02/19/2018    BILITOT 0.6 02/19/2018    WBC 5.61 02/19/2018    HGB 9.2 (L) 02/19/2018    HCT 29.5 (L) 02/19/2018     (L) 02/19/2018    INR 2.7 (H) 02/19/2018    INR 2.2 02/15/2018     02/19/2018    TSH 2.481 07/07/2017    CHOL 128 02/19/2018    HDL 60 02/19/2018    LDLCALC 55.6 (L) 02/19/2018    TRIG 62 02/19/2018   BNP improved, LDH unchanged  Assessment:      1. Heart replaced by heart assist device    2. Chronic systolic congestive heart failure    3. Essential hypertension    4. Gastrointestinal hemorrhage, unspecified gastrointestinal hemorrhage type    5. Acute posthemorrhagic anemia    6. AICD (automatic cardioverter/defibrillator) present    7. Nonischemic cardiomyopathy    8. Pure hypercholesterolemia    9. Anticoagulation monitoring, INR range 2-3        Plan:   Diuresis, wt loss, low sodium diet and fluid restriction reviewed along with daily weights and how to respond to 3# weight gain with prn diuretics.  If this fails to restore dry weight call us within 2-3 days.     Same treatment and routine f/u     Patient is now NYHA I    Listed for transplant: No    UNOS Patient Status  Functional Status: 100% - Normal, no complaints, no evidence of disease  Physical Capacity: No Limitations  Working for Income: No  If no, reason not working: Patient Choice - Retired

## 2018-03-06 NOTE — PLAN OF CARE
Problem: Patient Care Overview  Goal: Plan of Care Review  Outcome: Revised  Plan of care discussed with patient.  Patient ambulating independently. LVAD DP and numbers WNL, smooth LVAD hum. Patient has no complaints of pain. Discussed medications and care. Encouraged workbook, and filled in binder. Currently NPO for EGD.  Patient has no questions at this time. Will continue to monitor.

## 2018-03-06 NOTE — PROGRESS NOTES
[facility]  Heart Transplant  Progress Note    Patient Name: Suman Hayden  MRN: [id number]  Admission Date: 3/5/2018  Hospital Length of Stay: 1 days  Attending Physician: Deejay Duff Jr.,*  Primary Care Provider: Joe Ernst MD  Principal Problem:Anemia    Subjective:     Interval History:  Feeling better after transfusion. Denies further melena, dizziness, chest pain, and pain or SOB. Gen surgery planing on EGD today.    Continuous Infusions:   heparin (porcine) in 5 % dex       Scheduled Meds:   furosemide  20 mg Oral BID    pantoprazole  40 mg Intravenous BID    pravastatin  20 mg Oral Daily    sodium chloride 0.9%  10 mL Intravenous Q6H     PRN Meds:sodium chloride, sodium chloride, Flushing PICC Protocol **AND** sodium chloride 0.9% **AND** sodium chloride 0.9%    Review of patient's allergies indicates:   Allergen Reactions    Pneumococcal 23-killian ps vaccine      Objective:     Vital Signs (Most Recent):  Temp: 98.2 °F (36.8 °C) (03/06/18 1201)  Pulse: 84 (03/06/18 1201)  Resp: 18 (03/06/18 1201)  BP: (!) 74/0 (03/06/18 1201)  SpO2: (!) 90 % (03/06/18 1201) Vital Signs (24h Range):  Temp:  [98.2 °F (36.8 °C)-98.8 °F (37.1 °C)] 98.2 °F (36.8 °C)  Pulse:  [48-99] 84  Resp:  [16-18] 18  SpO2:  [90 %-99 %] 90 %  BP: (72-96)/(0-54) 74/0     Patient Vitals for the past 72 hrs (Last 3 readings):   Weight   03/05/18 2000 70.1 kg (154 lb 8.7 oz)     Body mass index is 22.82 kg/m².      Intake/Output Summary (Last 24 hours) at 03/06/18 1228  Last data filed at 03/06/18 1206   Gross per 24 hour   Intake            777.5 ml   Output             1580 ml   Net           -802.5 ml       Hemodynamic Parameters:       Telemetry:    Physical Exam   Constitutional: He is oriented to person, place, and time. He appears well-developed.   Eyes:   Mild conjuctival pallor   Neck: No JVD present.   Cardiovascular:   LVAD hum- smooth   Pulmonary/Chest: Effort normal and breath sounds normal.    Abdominal: Soft.   Musculoskeletal: He exhibits no edema or tenderness.   Neurological: He is alert and oriented to person, place, and time.   Skin: Skin is warm and dry.   Nursing note and vitals reviewed.      Significant Labs:  CBC:    Recent Labs  Lab 03/05/18 2037 03/06/18  0445   WBC 5.56 5.07  5.07   RBC 2.25* 2.43*  2.43*   HGB 5.9* 6.6*  6.6*   HCT 19.1* 20.3*  20.3*    157  157   MCV 85 84  84   MCH 26.2* 27.2  27.2   MCHC 30.9* 32.5  32.5     BNP:    Recent Labs  Lab 03/06/18  0446   *     CMP:    Recent Labs  Lab 03/05/18 2037 03/06/18  0445   GLU 81 73   CALCIUM 9.2 9.1   ALBUMIN 3.4*  --    PROT 6.5  --     138   K 4.7 4.3   CO2 23 22*    108   BUN 24* 21   CREATININE 1.2 1.0   ALKPHOS 66  --    ALT 17  --    AST 24  --    BILITOT 0.5  --       Coagulation:     Recent Labs  Lab 03/05/18 03/05/18 2037 03/06/18  0445   INR 2.0 1.9* 1.7*   APTT  --   --  25.8     LDH:    Recent Labs  Lab 03/06/18  0445        Microbiology:  Microbiology Results (last 7 days)     ** No results found for the last 168 hours. **          I have reviewed all pertinent labs within the past 24 hours.    Estimated Creatinine Clearance: 71.1 mL/min (based on SCr of 1 mg/dL).    Diagnostic Results:      Assessment and Plan:     No notes on file    * Anemia    - acute and related to GI bleed. Unclear location at the moment  - HB on admission 5.9. S/p 2 PRBC. Will repeat H/H after second PRBC transfusion  - GI to do EGD today. Possibly will need surgical review if previously noted ulcer bleeding. Keep NPO for now  - continue PPI BID push and monitor H/H closley        GI bleed    - unclear source. Management as above  -        Hepatitis B core antibody positive since 2012    - underwent evaluation by hepatology in the past without final recommendation  - consulted ID (dr. Moody) to review labs and recommend if patient is ok for [facility] listing          Heart transplant candidate    - will  await ID recommendation before discussing patient for listing.   - service contacted        LVAD (left ventricular assist device) present    - HM III placed on 7/2017  - INR today 1.7.   - Plan on heparin bridging once EGD is done if surgical consult is recommended or start back on coumadin if not  - Map at 60-80 and care per protocol  - continue GDMT once stable with lisinopril            Discussed plan of care with dr. Duff the attending on service    Baljinder Negrete MD  Heart Transplant  [facility]

## 2018-03-06 NOTE — INTERVAL H&P NOTE
Pre-Procedure H and P Addendum    Patient seen and examined.  History and exam unchanged from prior history and physical.      Procedure: EGD  Indication: Melena  ASA Class: per anesthesiology  Airway: normal  Neck Mobility: full range of motion  Mallampatti score: per anesthesia  History of anesthesia problems: no  Family history of anesthesia problems: no  Anesthesia Plan: MAC    Anesthesia/Surgery risks, benefits and alternative options discussed and understood by patient/family.          Active Hospital Problems    Diagnosis  POA    *Anemia [D64.9]  Yes    Melena [K92.1]  Yes    GI bleed [K92.2]  Yes    LVAD (left ventricular assist device) present [Z95.811]  Not Applicable    Hepatitis B core antibody positive since 2012 [R76.8]  Yes    Heart transplant candidate [Z76.82]  Not Applicable      Resolved Hospital Problems    Diagnosis Date Resolved POA   No resolved problems to display.

## 2018-03-06 NOTE — PROGRESS NOTES
Patient AAOX3 with wife at bedside. VAD interrogation completed this AM in the event changes needed to be made. Will continue to monitor for further issues.     Pulsatile: Yes   VAD Sounds: HM3  Smooth  Problems / Issues / Alarms with VAD if any: None noted  HCT: 20   Modular Cable Connection Intact(no yellow exposed): YES     VAD Interrogation:  TXP LVAD INTERROGATIONS 3/6/2018 3/6/2018 3/6/2018 3/5/2018 3/5/2018 2/19/2018 2/7/2018   Type HeartMate3 (No Data) HeartMate3 HeartMate3 HeartMate3 - -   Flow 4.2 - 4.2 4.0 4.0 - -   Speed 5250 - 5200 5200 5250 - -   PI 3.7 - 3.6 3.9 3.8 - -   Power ([place]) 3.6 - 3.4 3.5 3.6 - -   LSL - - - - 4800 - -   Pulsatility - - Pulse Pulse Pulse Pulse Pulse   }

## 2018-03-06 NOTE — ANESTHESIA POSTPROCEDURE EVALUATION
"Anesthesia Post Evaluation    Patient: Suman Hayden    Procedure(s) Performed: Procedure(s) (LRB):  ESOPHAGOGASTRODUODENOSCOPY (EGD) (N/A)    Final Anesthesia Type: general  Patient location during evaluation: PACU  Patient participation: Yes- Able to Participate  Level of consciousness: awake and alert and oriented  Post-procedure vital signs: reviewed and stable  Pain management: adequate  Airway patency: patent  PONV status at discharge: No PONV  Anesthetic complications: no      Cardiovascular status: blood pressure returned to baseline and hemodynamically stable  Respiratory status: unassisted, spontaneous ventilation and room air  Hydration status: euvolemic  Follow-up not needed.        Visit Vitals  BP (!) 82/55   Pulse 93   Temp 37.3 °C (99.1 °F) (Temporal)   Resp 20   Ht 5' 9" (1.753 m)   Wt 70.1 kg (154 lb 8.7 oz)   SpO2 100%   BMI 22.82 kg/m²       Pain/Gurpreet Score: Pain Assessment Performed: Yes (3/6/2018  4:00 PM)  Presence of Pain: denies (3/6/2018  4:00 PM)  Gurpreet Score: 10 (3/6/2018  4:00 PM)      "

## 2018-03-06 NOTE — ASSESSMENT & PLAN NOTE
- acute and related to GI bleed. Unclear location at the moment  - HB on admission 5.9. S/p 2 PRBC. Will repeat H/H after second PRBC transfusion  - GI to do EGD today. Possibly will need surgical review if previously noted ulcer bleeding. Keep NPO for now  - continue PPI BID push and monitor H/H julien

## 2018-03-06 NOTE — ANESTHESIA RELEASE NOTE
"Anesthesia Release from PACU Note    Patient: Suman Hayden    Procedure(s) Performed: Procedure(s) (LRB):  ESOPHAGOGASTRODUODENOSCOPY (EGD) (N/A)    Anesthesia type: general    Post pain: Adequate analgesia    Post assessment: no apparent anesthetic complications, tolerated procedure well and no evidence of recall    Last Vitals:   Visit Vitals  BP (!) 82/55   Pulse 93   Temp 37.3 °C (99.1 °F) (Temporal)   Resp 20   Ht 5' 9" (1.753 m)   Wt 70.1 kg (154 lb 8.7 oz)   SpO2 100%   BMI 22.82 kg/m²       Post vital signs: stable    Level of consciousness: awake, alert  and oriented    Nausea/Vomiting: no nausea/no vomiting    Complications: none    Airway Patency: patent    Respiratory: unassisted, spontaneous ventilation, room air    Cardiovascular: stable and blood pressure at baseline    Hydration: euvolemic  "

## 2018-03-06 NOTE — DISCHARGE INSTRUCTIONS

## 2018-03-06 NOTE — INTERVAL H&P NOTE
The patient has been examined and the H&P has been reviewed:    Briefly 66 y/o M with NICMM, HMIII as DT implanted 7/27/17. Post-op course complicated by Gi bleeding had double balloon enteropscopy in 12/2017 (bleeding ulcer)  and 1/25/2018 no bleeding but ulcer found.  Retrograde DBE on 1/26/18 which was negative. During last admission pt was transfused PRBCs during hospitalization and Hgb on day of d/c is 8.  Now per patient had his CBC checked today with Hgb in the 6's, with INR of 2. Reported melena (solid dark black stool x 1).  Denies low flow VAD alarms, no syncope, presyncope, N/V or hematemesis.As of yesterday patient states that he was able to climb 15 steps w/o any issues, but since today afternoon feels fatigued, and required a wheelchair to get from the parking garage to the hospital.    Exam:  Not in distress and pleasant  CTAB  Smooth VAD Hum  Extremities warm, no edema   Epigastric Tenderness     Plan:  CBC stat  Type and screen  If Hgb < 7 then transfuse PrBC   INR stat   Already took his dose of coumadin today  2 peripheral Ivs  Protonix 40mg IV BID   Keep NPO  GI consult       I concur with the findings and no changes have occurred since H&P was written.        Active Hospital Problems    Diagnosis  POA    GI bleed [K92.2]  Yes      Resolved Hospital Problems    Diagnosis Date Resolved POA   No resolved problems to display.

## 2018-03-06 NOTE — HPI
68 yo M [facility] NIC (HMIII 7/2017), h/o GI bleed due to ileal NSAID induced ulcer presents with anemia and melena.  He first noticed the melena after he was admitted.  He has had one episode of black tarry stool so far.  No N/V/hematemesis.  He takes coumadin, but no other blood thinners, NSAIDs, or sleep aids.  Baseline Hgb is around 7.  Hgb this admission is 6.6    Recently admitted on 1/23 for melena, during which he underwent upper and lower DBE which showed no source of bleeding.  GI's final recs at that time were to continue to keep pt off NSAID/ASA, cont PPI, and follow CBC and INR.    01/26/18 Lower DBE Sigmoid diverticulosis  01/25/18 Upper DBE Erythema and polyps in stomach, ileum circumferental ulcer causing stenosis  12/14/17 Upper DBE Gastritis and gastric polyps, ulcer in ileum with stricture, video capsule in ileum removed  12/12/17 VCE  Multiple ulcers in small bowel  12/08/17 EGD  Gastritis/duodenitis  08/17/17 EGD  Clotted blood in stomach, hemorrhagic appearing gastric mucosa, hiatal hernia

## 2018-03-06 NOTE — ASSESSMENT & PLAN NOTE
- HM III placed on 7/2017  - INR today 1.7.   - Plan on heparin bridging once EGD is done if surgical consult is recommended or start back on coumadin if not  - Map at 60-80 and care per protocol  - continue GDMT once stable with lisinopril

## 2018-03-06 NOTE — NURSING TRANSFER
Nursing Transfer Note      3/6/2018     Transfer From: EGD    Transfer via stretcher    Transfer with cardiac monitoring     Chart send with patient: Yes

## 2018-03-06 NOTE — ANESTHESIA PREPROCEDURE EVALUATION
03/06/2018  [facility]  Anesthesia Pre-Operative Evaluation         Patient Name: Suman Hayden  YOB: 1950  MRN: [id number]    SUBJECTIVE:     Pre-operative evaluation for Procedure:  EGD     03/06/2018    Suman Hayden is a 67 y.o. male w/ a significant PMHx of NICM (EF 10%) s/p LVAD in July 2017, HTN, HLD, s/p ICD revision on 11/20/17c/b by pocket hematoma. Patient was recently admitted with bacteremia. Patient now admitted after having supratherapeutic INR and Hb of 4.1 and dark stools s/p 2u PRBCs (on coumadin at home). Patient had GI bleed in December and found to have ileal ulcer requiring balloon enteroscopy and blood transfusions. Pt is s/p anterograde enteroscopy and now presents for the above procedure.     Pt denies recent illness, fever, chills, N/V, cough, SOB, or CP.     INR-2.4      LDA:        PICC Double Lumen 01/10/18 1055 right brachial (Active)   Site Assessment Clean;Dry;Intact 1/25/2018 10:48 AM   Lumen 1 Status Normal saline locked 1/25/2018 10:48 AM   Lumen 2 Status Normal saline locked 1/25/2018 10:48 AM   Dressing Type Transparent 1/25/2018 10:48 AM   Dressing Status Biopatch in place;Clean;Dry;Intact 1/25/2018 10:48 AM   Daily Line Review Performed 1/25/2018 10:48 AM   Number of days: 15            Peripheral IV - Single Lumen 01/25/18 1001 Left Antecubital (Active)   Site Assessment Clean;Dry;Intact 1/25/2018 10:48 AM   Line Status Saline locked;Flushed 1/25/2018 10:48 AM   Dressing Status Clean;Dry;Intact 1/25/2018 10:48 AM   Reason Not Rotated Not due 1/25/2018 10:48 AM   Number of days: 0            VAD 07/27/17 1312 Left ventricular assist device HeartMate 3 (Active)   Site Location Abdomen right 1/25/2018 10:48 AM   Site Assessment Clean;Dry;Intact 1/25/2018 10:48 AM   Driveline Exit Site 1 1/25/2018 10:01 AM   Dressing Status  Clean;Dry;Intact 1/25/2018 10:48 AM   Dressing Intervention Dressing changed 1/24/2018 12:45 AM   Performed By RN 1/24/2018 10:23 AM   Dressing Change Schedule Daily 1/25/2018 10:48 AM   Dressing Change Due 01/25/18 1/24/2018 10:23 AM   Driveline [place] in use H-tag 1/25/2018 10:01 AM   Condition CDI 1/25/2018 10:01 AM   Date changed 01/24/18 1/24/2018 12:45 AM   Power Source Battery A;Battery B 1/25/2018 10:48 AM   Equipment inventory at  Admission 1/25/2018 10:01 AM   Pump type HeartMate3 1/25/2018 10:01 AM   Primary Controller [id number] 1/25/2018 10:01 AM   Extra Controller [id number] 1/25/2018 10:01 AM   Battery 1 [id number] 1/25/2018 10:01 AM   Battery 2 [id number] 1/25/2018 10:01 AM   Battery 3 [id number] 1/25/2018 10:01 AM   Battery 4 [id number] 1/25/2018 10:01 AM   Battery Clips 4 1/25/2018 10:01 AM   Number of days: 182       Prev airway:   Placement Date: 01/25/18; Placement Time: 0850; Method of Intubation: Direct laryngoscopy; Inserted by: Anesthesia MD; Airway Device: Endotracheal Tube; Mask Ventilation: Not Attempted; Intubated: Postinduction; Blade: Shahzad #3; Airway Device Size: 7.5; Style: Cuffed; Cuff Inflation: Minimal occlusive pressure; Inflation Amount: 7; Placement Verified By: Auscultation, ETT Condensation, Capnometry; Grade: Grade II; Complicating Factors: Long mandible; Intubation Findings: Positive EtCO2, Bilateral breath sounds, Atraumatic/Condition of teeth unchanged;  Depth of Insertion: 22; Securment: Lips; Complications: None; Breath Sounds: Equal Bilateral; Insertion Attempts: 1; Removal Date: 01/25/18;  Removal Time: 0955    Drips: None documented.       Patient Active Problem List   Diagnosis    Nonischemic cardiomyopathy    Encounter for monitoring amiodarone therapy    Essential hypertension    V-tach    Elevated PSA    Hepatitis B core antibody positive since 2012    Chronic systolic congestive heart failure    Heart transplant candidate    Hyperbilirubinemia    Malfunction  of implantable cardioverter-defibrillator (ICD) electrode    Syncope and collapse    Atrial tachycardia    Hyperglycemia    AICD (automatic cardioverter/defibrillator) present    LVAD (left ventricular assist device) present    Atrial fibrillation    Heart replaced by heart assist device    Anticoagulation monitoring, INR range 2-3    Defibrillator discharge    Subtherapeutic international normalized ratio (INR)    Constipation    Normocytic anemia    Anemia    Ileum ulcer    Staphylococcus epidermidis bacteremia    GI bleed    Melena    Pure hypercholesterolemia       Review of patient's allergies indicates:  No Known Allergies    Current Inpatient Medications:      Current Facility-Administered Medications on File Prior to Visit   Medication Dose Route Frequency Provider Last Rate Last Dose    0.9%  NaCl infusion (for blood administration)   Intravenous Q24H PRN Gabbi Barajas MD        0.9%  NaCl infusion (for blood administration)   Intravenous Q24H PRN Gabbi Barajas MD        furosemide tablet 20 mg  20 mg Oral BID Gabbi Barajas MD   20 mg at 03/06/18 0900    heparin 25,000 units in dextrose 5% 250 mL (100 units/mL) infusion (heparin infusion)  17 Units/kg/hr Intravenous Continuous Baljinder Negrete MD        pantoprazole injection 40 mg  40 mg Intravenous BID Gabbi Barajas MD   40 mg at 03/06/18 0911    pravastatin tablet 20 mg  20 mg Oral Daily Gabbi Barajas MD   20 mg at 03/06/18 0910    sodium chloride 0.9% flush 10 mL  10 mL Intravenous Q6H Deejay Duff Jr., MD        And    sodium chloride 0.9% flush 10 mL  10 mL Intravenous PRN Deejay Duff Jr., MD         Current Outpatient Prescriptions on File Prior to Visit   Medication Sig Dispense Refill    amLODIPine (NORVASC) 5 MG tablet Take 5 mg by mouth once daily. Take one tablet 5mg by mouth once a day.      dronabinol (MARINOL) 5 MG capsule Take 1 capsule (5 mg total) by  mouth 3 (three) times daily before meals. 90 capsule 5    furosemide (LASIX) 20 MG tablet Take 1 tablet (20 mg total) by mouth 2 (two) times daily. 60 tablet 11    furosemide (LASIX) 40 MG tablet       lisinopril (PRINIVIL,ZESTRIL) 5 MG tablet Take 1 tablet (5 mg total) by mouth once daily. 90 tablet 3    magnesium oxide (MAG-OX) 400 mg tablet Take 1 tablet (400 mg total) by mouth 2 (two) times daily. 60 tablet 11    ondansetron (ZOFRAN-ODT) 4 MG TbDL Take 4 mg by mouth every 6 (six) hours as needed.       pantoprazole (PROTONIX) 40 MG tablet Take 1 tablet (40 mg total) by mouth 2 (two) times daily before meals. 60 tablet 11    polyethylene glycol (GLYCOLAX) 17 gram/dose powder Take 17 g by mouth daily as needed. 1700 g 3    potassium chloride (MICRO-K) 10 MEQ CpSR Take 2 capsules (20 mEq total) by mouth once daily. 60 capsule 11    pravastatin (PRAVACHOL) 20 MG tablet Take 1 tablet (20 mg total) by mouth every evening. 30 tablet 11    warfarin (COUMADIN) 5 MG tablet Take 5mg orally daily 32 tablet 5       Past Surgical History:   Procedure Laterality Date    ABDOMINAL SURGERY      CARDIAC CATHETERIZATION      CARDIAC DEFIBRILLATOR PLACEMENT      LEFT VENTRICULAR ASSIST DEVICE  07/27/2017       Social History     Social History    Marital status:      Spouse name: N/A    Number of children: N/A    Years of education: N/A     Occupational History    Not on file.     Social History Main Topics    Smoking status: Never Smoker    Smokeless tobacco: Never Used    Alcohol use No    Drug use: No    Sexual activity: Not on file     Other Topics Concern    Not on file     Social History Narrative    No narrative on file       OBJECTIVE:     Vital Signs Range (Last 24H):  Temp:  [36.8 °C (98.2 °F)-37.6 °C (99.6 °F)]   Pulse:  [48-99]   Resp:  [16-18]   BP: ()/(0-57)   SpO2:  [90 %-99 %]       CBC:   Recent Labs      03/06/18   0445  03/06/18   1324   WBC  5.07  5.07  5.08   RBC  2.43*   2.43*  2.78*   HGB  6.6*  6.6*  7.7*   HCT  20.3*  20.3*  23.7*   PLT  157  157  156   MCV  84  84  85   MCH  27.2  27.2  27.7   MCHC  32.5  32.5  32.5       CMP:   Recent Labs      03/05/18 2037 03/06/18   0445   NA  138  138   K  4.7  4.3   CL  106  108   CO2  23  22*   BUN  24*  21   CREATININE  1.2  1.0   GLU  81  73   MG  2.2  2.3   PHOS   --   4.4   CALCIUM  9.2  9.1   ALBUMIN  3.4*   --    PROT  6.5   --    ALKPHOS  66   --    ALT  17   --    AST  24   --    BILITOT  0.5   --        INR:  Recent Labs     03/05/18 03/05/18 2037 03/06/18   0445   INR  2.0  1.9*  1.7*   APTT   --    --   25.8       Diagnostic Studies:     EKG:     LVAD interrogation 01/25/18: with no report     2D ECHO:  Results for orders placed or performed during the hospital encounter of 11/20/17   2D echo with color flow doppler   Result Value Ref Range    EF 13 (A) 55 - 65    Mitral Valve Regurgitation TRIVIAL TO MILD     Diastolic Dysfunction Yes (A)     Est. PA Systolic Pressure 41.42 (A)     Tricuspid Valve Regurgitation TRIVIAL          ASSESSMENT/PLAN:         Pre-op Assessment    I have reviewed the Patient Summary Reports.     I have reviewed the Nursing Notes.   I have reviewed the Medications.     Review of Systems  Anesthesia Hx:  No problems with previous Anesthesia  History of prior surgery of interest to airway management or planning: heart surgery. Previous anesthesia: General  Denies Personal Hx of Anesthesia complications.   Social:  Non-Smoker, No Alcohol Use    Hematology/Oncology:     Oncology Normal    -- Anemia:   EENT/Dental:EENT/Dental Normal   Cardiovascular:   Exercise tolerance: poor Pacemaker (AICD, last shock 11/28, hematoma over site) Hypertension CAD   CHF NYHA Classification IV S/p LVAD   Pulmonary:  Pulmonary Normal    Renal/:  Renal/ Normal     Hepatic/GI:  Hepatic/GI Normal PUD, GI bleed   Musculoskeletal:  Musculoskeletal Normal    Neurological:  Neurology Normal    Endocrine:  Endocrine  Normal    Dermatological:  Skin Normal    Psych:  Psychiatric Normal           Physical Exam  General:  Well nourished    Airway/Jaw/Neck:  Airway Findings: Mouth Opening: Normal Tongue: Normal  General Airway Assessment: Adult  Mallampati: II  TM Distance: Normal, at least 6 cm  Jaw/Neck Findings:  Neck ROM: Normal ROM      Dental:  Dental Findings: Edentulous, Upper Dentures, Lower Dentures   Chest/Lungs:  Chest/Lungs Findings: Normal Respiratory Rate     Heart/Vascular:  Heart Findings: (LVAD hum)       Mental Status:  Mental Status Findings:  Cooperative, Alert and Oriented         Anesthesia Plan  Type of Anesthesia, risks & benefits discussed:  Anesthesia Type:  general, MAC  Patient's Preference:   Intra-op Monitoring Plan: standard ASA monitors  Intra-op Monitoring Plan Comments:   Post Op Pain Control Plan: multimodal analgesia, IV/PO Opioids PRN and per primary service following discharge from PACU  Post Op Pain Control Plan Comments:   Induction:   IV  Beta Blocker:  Patient is not currently on a Beta-Blocker (No further documentation required).       Informed Consent: Patient understands risks and agrees with Anesthesia plan.  Questions answered. Anesthesia consent signed with patient.  ASA Score: 4     Day of Surgery Review of History & Physical: I have interviewed and examined the patient. I have reviewed the patient's H&P dated:    H&P update referred to the provider.         Ready For Surgery From Anesthesia Perspective.

## 2018-03-06 NOTE — PROVATION PATIENT INSTRUCTIONS
Discharge Summary/Instructions after an Endoscopic Procedure  Patient Name: Suman Hayden  Patient MRN: [id number]  Patient YOB: 1950 Tuesday, March 06, 2018  Andres Chatterjee MD  RESTRICTIONS:  During your procedure today, you received medications for sedation.  These   medications may affect your judgment, balance and coordination.  Therefore,   for 24 hours, you have the following restrictions:   - DO NOT drive a car, operate machinery, make legal/financial decisions,   sign important papers or drink alcohol.    ACTIVITY:  The following day: return to full activity including work, except no heavy   lifting, straining or running for 3 days if polyps were removed.  DIET:  Eat and drink normally unless instructed otherwise.     TREATMENT FOR COMMON SIDE EFFECTS:  - Mild abdominal pain, nausea, belching, bloating or excessive gas:  rest,   eat lightly and use a heating pad.  - Sore Throat: treat with throat lozenges and/or gargle with warm salt   water.  - Because air was used during the procedure, expelling large amounts of air   from your rectum or belching is normal.  - If a bowel prep was taken, you may not have a bowel movement for 1-3 days.    This is normal.  SYMPTOMS TO WATCH FOR AND REPORT TO YOUR PHYSICIAN:  1. Abdominal pain or bloating, other than gas cramps.  2. Chest pain.  3. Back pain.  4. Signs of infection such as: chills or fever occurring within 24 hours   after the procedure.  5. Rectal bleeding, which would show as bright red, maroon, or black stools.   (A tablespoon of blood from the rectum is not serious, especially if   hemorrhoids are present.)  6. Vomiting.  7. Weakness or dizziness.  GO DIRECTLY TO THE NEAREST EMERGENCY ROOM IF YOU HAVE ANY OF THE FOLLOWING:      Difficulty breathing  Chills and/or fever over 101 F   Persistent vomiting and/or vomiting blood   Severe abdominal pain   Severe chest pain   Black, tarry stools   Bleeding- more than one tablespoon   Any other symptom  or condition that you feel may need urgent attention  Your doctor recommends these additional instructions:  If any biopsies were taken, your doctors clinic will contact you in 1 to 2   weeks with any results.  Resume your previous diet.   Continue your present medications.   We are waiting for your pathology results.   Take Prilosec (omeprazole) 20 mg by mouth twice a day for two weeks.  For questions, problems or results please call your physician - Andres Chatterjee MD at Work:  [phone].  [facility], EMERGENCY ROOM PHONE NUMBER: [phone]  IF A COMPLICATION OR EMERGENCY SITUATION ARISES AND YOU ARE UNABLE TO REACH   YOUR PHYSICIAN - GO DIRECTLY TO THE EMERGENCY ROOM.  Andres Chatterjee MD  3/6/2018 3:50:08 PM  This report has been verified and signed electronically.

## 2018-03-06 NOTE — H&P (VIEW-ONLY)
[facility]  Gastroenterology  Consult Note    Patient Name: Suman Hayden  MRN: [id number]  Admission Date: 3/5/2018  Hospital Length of Stay: 1 days  Code Status: Full Code   Attending Provider: Deejay Duff Jr.,*   Consulting Provider: Senia Tobar MD  Primary Care Physician: Joe Ernst MD  Principal Problem:<principal problem not specified>    Inpatient consult to Gastroenterology  Consult performed by: SENIA TOBAR  Consult ordered by: TARIQ SALTER        Subjective:     HPI:  68 yo M PMH NICM (HMIII 7/2017), h/o GI bleed due to ileal NSAID induced ulcer presents with anemia and melena.  He first noticed the melena after he was admitted.  He has had one episode of black tarry stool so far.  No N/V/hematemesis.  He takes coumadin, but no other blood thinners, NSAIDs, or sleep aids.  Baseline Hgb is around 7.  Hgb this admission is 6.6    Recently admitted on 1/23 for melena, during which he underwent upper and lower DBE which showed no source of bleeding.  GI's final recs at that time were to continue to keep pt off NSAID/ASA, cont PPI, and follow CBC and INR.    01/26/18 Lower DBE Sigmoid diverticulosis  01/25/18 Upper DBE Erythema and polyps in stomach, ileum circumferental ulcer causing stenosis  12/14/17 Upper DBE Gastritis and gastric polyps, ulcer in ileum with stricture, video capsule in ileum removed  12/12/17 VCE  Multiple ulcers in small bowel  12/08/17 EGD  Gastritis/duodenitis  08/17/17 EGD  Clotted blood in stomach, hemorrhagic appearing gastric mucosa, hiatal hernia    Past Medical History:   Diagnosis Date    Arthritis     Cardiomyopathy     CHF (congestive heart failure)     Coronary artery disease     Encounter for blood transfusion     Hyperlipidemia     Hypertension     Hypotension, iatrogenic     Obesity     S/P implantation of automatic cardioverter/defibrillator (AICD)     Ventricular tachycardia        Past Surgical History:    Procedure Laterality Date    ABDOMINAL SURGERY      CARDIAC CATHETERIZATION      CARDIAC DEFIBRILLATOR PLACEMENT      LEFT VENTRICULAR ASSIST DEVICE  07/27/2017       Review of patient's allergies indicates:   Allergen Reactions    Pneumococcal 23-killian ps vaccine      Family History     Problem Relation (Age of Onset)    Heart disease Mother, Father        Social History Main Topics    Smoking status: Never Smoker    Smokeless tobacco: Never Used    Alcohol use No    Drug use: No    Sexual activity: Not on file     Review of Systems   Constitutional: Negative for chills and fever.   HENT: Negative for congestion and sore throat.    Eyes: Negative for discharge and visual disturbance.   Respiratory: Positive for shortness of breath. Negative for cough.    Cardiovascular: Negative for chest pain.   Gastrointestinal: Negative for abdominal distention, abdominal pain, constipation, diarrhea, nausea and vomiting.   Genitourinary: Negative for dysuria and frequency.   Musculoskeletal: Negative for joint swelling and myalgias.   Skin: Negative for rash.   Neurological: Negative for dizziness and light-headedness.     Objective:     Vital Signs (Most Recent):  Temp: 98.7 °F (37.1 °C) (03/06/18 0607)  Pulse: 73 (03/06/18 0607)  Resp: 18 (03/06/18 0607)  BP: (!) 78/0 (03/06/18 0607)  SpO2: 98 % (03/06/18 0607) Vital Signs (24h Range):  Temp:  [98.2 °F (36.8 °C)-98.7 °F (37.1 °C)] 98.7 °F (37.1 °C)  Pulse:  [48-99] 73  Resp:  [16-18] 18  SpO2:  [98 %-99 %] 98 %  BP: (72-96)/(0-54) 78/0     Weight: 70.1 kg (154 lb 8.7 oz) (03/05/18 2000)  Body mass index is 22.82 kg/m².      Intake/Output Summary (Last 24 hours) at 03/06/18 0815  Last data filed at 03/06/18 0500   Gross per 24 hour   Intake            777.5 ml   Output              600 ml   Net            177.5 ml       Lines/Drains/Airways     Line                 VAD 07/27/17 1312 Left ventricular assist device HeartMate 3 221 days          Peripheral Intravenous  Line                 Peripheral IV - Single Lumen 03/05/18 2040 Right Forearm less than 1 day         Peripheral IV - Single Lumen 03/05/18 2102 Right Forearm less than 1 day                Physical Exam   Constitutional: He appears well-developed.   Very pleasant    HENT:   Head: Normocephalic and atraumatic.   Eyes: Conjunctivae and EOM are normal. Right eye exhibits no discharge. Left eye exhibits no discharge.   Neck: Normal range of motion.   Cardiovascular:   No murmur heard.  LVAD sounds   Pulmonary/Chest: Effort normal. No respiratory distress.   Abdominal: Soft. Bowel sounds are normal. He exhibits no distension. There is no tenderness.   Musculoskeletal: Normal range of motion.   Trace edema b/l LE   Neurological: He is alert.   Skin: Skin is warm and dry.       Significant Labs:  CBC:   Recent Labs  Lab 03/05/18 2037 03/06/18  0445   WBC 5.56 5.07  5.07   HGB 5.9* 6.6*  6.6*   HCT 19.1* 20.3*  20.3*    157  157     BMP:   Recent Labs  Lab 03/06/18  0445   GLU 73      K 4.3      CO2 22*   BUN 21   CREATININE 1.0   CALCIUM 9.1   MG 2.3       Significant Imaging:  Imaging results within the past 24 hours have been reviewed.    Assessment/Plan:     Melena    --melena may be secondary to gastric polyps vs ileal ulcer  --will do EGD tomorrow 3/7 to assess for need to remove gastric polyps vs find other sources of bleeding  --okay to have low residue diet today, make NPO at midnight for EGD tomorrow  --may need general surgery consult down the line  --continue IV PPI BID  --recommend to hold off on warfarin today (INR needs to be below 2.5 for any intervention), and place on heparin drip for now.    Case discussed with fellow and staff            Thank you for your consult. I will follow-up with patient. Please contact us if you have any additional questions.    Senia Tobar MD  Gastroenterology  [facility]

## 2018-03-06 NOTE — PROCEDURES
"Suman Hayden is a 67 y.o. male patient.    Temp: 98.8 °F (37.1 °C) (03/06/18 0902)  Pulse: 70 (03/06/18 0902)  Resp: 18 (03/06/18 0902)  BP: (!) 76/0 (03/06/18 0902)  SpO2: 99 % (03/06/18 0902)  Weight: 70.1 kg (154 lb 8.7 oz) (03/05/18 2000)  Height: 5' 9" (175.3 cm) (03/05/18 2000)    PICC  Date/Time: 3/6/2018 9:51 AM  Performed by: LORENA SIMS  Assisting provider: CARLEE PALOMINO  Consent Done: Yes  Time out: Immediately prior to procedure a time out was called to verify the correct patient, procedure, equipment, support staff and site/side marked as required  Indications: med administration and vascular access  Anesthesia: local infiltration  Local anesthetic: lidocaine 1% without epinephrine  Anesthetic Total (mL): 2  Preparation: skin prepped with ChloraPrep  Skin prep agent dried: skin prep agent completely dried prior to procedure  Sterile barriers: all five maximum sterile barriers used - cap, mask, sterile gown, sterile gloves, and large sterile sheet  Hand hygiene: hand hygiene performed prior to central venous catheter insertion  Location details: right brachial  Catheter type: double lumen  Catheter size: 5 Fr  Catheter Length: 37cm    Ultrasound guidance: yes  Vessel Caliber: large and patent, compressibility normal  Vascular Doppler: not done  Needle advanced into vessel with real time Ultrasound guidance.  Guidewire confirmed in vessel.  Image recorded and saved.  Sterile sheath used.  Number of attempts: 1  Post-procedure: blood return through all ports, chlorhexidine patch and sterile dressing applied  Technical procedures used: 3CG  Specimens: No  Implants: No  Assessment: placement verified by x-ray  Complications: none        Carlee Palomino  3/6/2018  "

## 2018-03-06 NOTE — SUBJECTIVE & OBJECTIVE
Interval History:  Feeling better after transfusion. Denies further melena, dizziness, chest pain, and pain or SOB. Gen surgery planing on EGD today.    Continuous Infusions:   heparin (porcine) in 5 % dex       Scheduled Meds:   furosemide  20 mg Oral BID    pantoprazole  40 mg Intravenous BID    pravastatin  20 mg Oral Daily    sodium chloride 0.9%  10 mL Intravenous Q6H     PRN Meds:sodium chloride, sodium chloride, Flushing PICC Protocol **AND** sodium chloride 0.9% **AND** sodium chloride 0.9%    Review of patient's allergies indicates:   Allergen Reactions    Pneumococcal 23-killian ps vaccine      Objective:     Vital Signs (Most Recent):  Temp: 98.2 °F (36.8 °C) (03/06/18 1201)  Pulse: 84 (03/06/18 1201)  Resp: 18 (03/06/18 1201)  BP: (!) 74/0 (03/06/18 1201)  SpO2: (!) 90 % (03/06/18 1201) Vital Signs (24h Range):  Temp:  [98.2 °F (36.8 °C)-98.8 °F (37.1 °C)] 98.2 °F (36.8 °C)  Pulse:  [48-99] 84  Resp:  [16-18] 18  SpO2:  [90 %-99 %] 90 %  BP: (72-96)/(0-54) 74/0     Patient Vitals for the past 72 hrs (Last 3 readings):   Weight   03/05/18 2000 70.1 kg (154 lb 8.7 oz)     Body mass index is 22.82 kg/m².      Intake/Output Summary (Last 24 hours) at 03/06/18 1228  Last data filed at 03/06/18 1206   Gross per 24 hour   Intake            777.5 ml   Output             1580 ml   Net           -802.5 ml       Hemodynamic Parameters:       Telemetry:    Physical Exam   Constitutional: He is oriented to person, place, and time. He appears well-developed.   Eyes:   Mild conjuctival pallor   Neck: No JVD present.   Cardiovascular:   LVAD hum- smooth   Pulmonary/Chest: Effort normal and breath sounds normal.   Abdominal: Soft.   Musculoskeletal: He exhibits no edema or tenderness.   Neurological: He is alert and oriented to person, place, and time.   Skin: Skin is warm and dry.   Nursing note and vitals reviewed.      Significant Labs:  CBC:    Recent Labs  Lab 03/05/18 2037 03/06/18  0445   WBC 5.56 5.07  5.07    RBC 2.25* 2.43*  2.43*   HGB 5.9* 6.6*  6.6*   HCT 19.1* 20.3*  20.3*    157  157   MCV 85 84  84   MCH 26.2* 27.2  27.2   MCHC 30.9* 32.5  32.5     BNP:    Recent Labs  Lab 03/06/18  0446   *     CMP:    Recent Labs  Lab 03/05/18 2037 03/06/18  0445   GLU 81 73   CALCIUM 9.2 9.1   ALBUMIN 3.4*  --    PROT 6.5  --     138   K 4.7 4.3   CO2 23 22*    108   BUN 24* 21   CREATININE 1.2 1.0   ALKPHOS 66  --    ALT 17  --    AST 24  --    BILITOT 0.5  --       Coagulation:     Recent Labs  Lab 03/05/18 03/05/18 2037 03/06/18 0445   INR 2.0 1.9* 1.7*   APTT  --   --  25.8     LDH:    Recent Labs  Lab 03/06/18  0445        Microbiology:  Microbiology Results (last 7 days)     ** No results found for the last 168 hours. **          I have reviewed all pertinent labs within the past 24 hours.    Estimated Creatinine Clearance: 71.1 mL/min (based on SCr of 1 mg/dL).    Diagnostic Results:

## 2018-03-06 NOTE — TRANSFER OF CARE
"Anesthesia Transfer of Care Note    Patient: Suman Hayden    Procedure(s) Performed: Procedure(s) (LRB):  ESOPHAGOGASTRODUODENOSCOPY (EGD) (N/A)    Patient location: PACU    Anesthesia Type: general    Transport from OR: Transported from OR on 2-3 L/min O2 by NC with adequate spontaneous ventilation    Post pain: adequate analgesia    Post assessment: no apparent anesthetic complications    Post vital signs: stable    Level of consciousness: awake    Nausea/Vomiting: no nausea/vomiting    Complications: none    Transfer of care protocol was followed      Last vitals:   Visit Vitals  BP 99/72   Pulse 102   Temp 37.3 °C (99.1 °F) (Temporal)   Resp 16   Ht 5' 9" (1.753 m)   Wt 70.1 kg (154 lb 8.7 oz)   SpO2 100%   BMI 22.82 kg/m²     "

## 2018-03-06 NOTE — ASSESSMENT & PLAN NOTE
- underwent evaluation by hepatology in the past without final recommendation  - consulted ID (dr. Moody) to review labs and recommend if patient is ok for OHT listing

## 2018-03-06 NOTE — PLAN OF CARE
Problem: Patient Care Overview  Goal: Plan of Care Review  Outcome: Ongoing (interventions implemented as appropriate)  Vital signs stable. Afebrile. Alert, oriented and following commands. Denies pain/nausea. No VAD events. Inventory completed upon pacu arrival.

## 2018-03-06 NOTE — TREATMENT PLAN
GI Treatment Plan    EGD to be done today.   Keep NPO.  Hold heparin.    Timmy Hobbs MD PGY-IV  Gastroenterology Fellow  [facility]  P [phone]

## 2018-03-06 NOTE — PROGRESS NOTES
Admit Note     Met with patient and wife to assess needs. Patient is a 67 y.o.  male, admitted for GI bleed [k92.2] per medical record. Pt has an LVAD, implanted in 7/2017.     Patient admitted from home on 3/5/2018 .  At this time, patient presents as alert and oriented x 4, pleasant, good eye contact, calm, communicative, cooperative and asking and answering questions appropriately.  At this time, patients caregiver presents as alert and oriented x 4, pleasant, good eye contact, calm, communicative, cooperative and asking and answering questions appropriately.    Household/Family Systems     Patient resides with patient's daughter and son in law at,     [address] [place] [address], [address]  [place] [address]    Pt's wife states she is staying with their daughter as well.     Pt's [place] [address]:     [address] [place] [address] [address]      Home: [phone](currently out of service).   Kia Hayden(wife): [phone]    Additional Emergency Contacts:   Rayna(daughter): [phone]  Boni Hayden(son): [phone]  Artie Hayden(nephew): [phone]    Support system includes spouse and extended family.  Patient does not have dependents that are need of being cared for.     Patients primary caregiver is Kia Hayden, patients wife.    During admission, patient's caregiver plans to stay in patient's room.  Confirmed patient and patients caregivers do have access to reliable transportation.    Cognitive Status/Learning     Patient reports reading ability as 12th grade and states patient does not have difficulty with reading, writing, seeing, hearing and memory.  Patient reports patient learns best by visual.   Needed: No.   Highest education level: High School (9-12) or GED    Vocation/Disability   .  Working for Income: No  If no, reason not working: Patient Choice - Retired  Patient is retired from [employer] in 2000. Pt was a . Pt's wife was working  as a PCA until 2017, however she cares for pt full time now .    Adherence     Patient reports a high level of adherence to patients health care regimen.  Adherence counseling and education provided. Patient verbalizes understanding.    Substance Use    Patient reports the following substance usage.    Tobacco: none, patient denies any use.  Alcohol: none, patient denies any use.  Illicit Drugs/Non-prescribed Medications: none, patient denies any use.  Patient states clear understanding of the potential impact of substance use.  Substance abstinence/cessation counseling, education and resources provided and reviewed.     Services Utilizing/ADLS    Infusion Service: Prior to admission, patient utilizing? no, [place] in past.  Home Health: Prior to admission, patient utilizing? no, [facility] in past.  DME: Prior to admission, yes, walker, wheelchair, and bedside commode  Pulmonary/Cardiac Rehab: Prior to admission, no  Dialysis:  Prior to admission, no  Transplant Specialty Pharmacy:  Prior to admission, no.    Prior to admission, patient reports patient was independent with ADLS and was not driving since 2017.  Patient reports patient is able to care for self at this time.Patient indicates a willingness to care for self once medically cleared to do so.    Insurance/Medications    Insured by   Payor/Plan Subscr  Sex Relation Sub. Ins. ID Effective Group Num   1. MEDICARE - [place]* MIRTA LOPEZ 1950 Male  [id number] 6/1/15                                    [address]   2. [employer] CROSS * MIRTA LOPEZ 1950 Male  [id number] 1/1/10 [id number]                                   [address] [address]      Primary Insurance (for UNOS reporting): Public Insurance - Medicare FFS (Fee For Service)  Secondary Insurance (for UNOS reporting): Private Insurance    Patient reports patient is able to obtain and afford medications at this time and at time of discharge.    Living Will/Healthcare Power of      Patient states patient does not have a LW and/or HCPA.   provided education regarding LW and HCPA and the completion of forms.    Coping/Mental Health    Patient is coping well with the aid of  family members and spiritual support: Pt reports is Worship. Patient denies mental health difficulties.     Discharge Planning    At time of discharge, patient plans to return to patient's home under the care of wife.  Patients wife will transport patient.  Per rounds today, expected discharge date has not been medically determined at this time. Patient and patients caregiver  verbalize understanding and are involved in treatment planning and discharge process.    Additional Concerns    Patient is being followed for needs, education, resources, information, emotional support, supportive counseling, and for supportive and skilled discharge plan of care.  remains available. Patient's caregiver verbalizes understanding and agreement with information reviewed,  availability and how to access available resources as needed. Patient denies additional needs and/or concerns at this time. Patient verbalizes understanding and agreement with information reviewed, social work availability, and how to access available resources as needed.

## 2018-03-07 NOTE — SUBJECTIVE & OBJECTIVE
Interval History:     No acute overnight event. No further melena while still on heparin drip. S/P EGD and gastric polypectomy ( non bleeding) yesterday. denies abd pain, melena or dizziness.    Continuous Infusions:   heparin (porcine) in 5 % dex Stopped (03/07/18 1230)     Scheduled Meds:   furosemide  20 mg Oral BID    pantoprazole  40 mg Intravenous BID    pravastatin  20 mg Oral Daily    sodium chloride 0.9%  10 mL Intravenous Q6H     PRN Meds:sodium chloride, sodium chloride, Flushing PICC Protocol **AND** sodium chloride 0.9% **AND** sodium chloride 0.9%    Review of patient's allergies indicates:   Allergen Reactions    Pneumococcal 23-killian ps vaccine      Objective:     Vital Signs (Most Recent):  Temp: 99.4 °F (37.4 °C) (03/07/18 1241)  Pulse: 72 (03/07/18 1241)  Resp: 18 (03/07/18 1241)  BP: (!) 60/0 (03/07/18 0900)  SpO2: 98 % (03/07/18 1241) Vital Signs (24h Range):  Temp:  [98.3 °F (36.8 °C)-99.6 °F (37.6 °C)] 99.4 °F (37.4 °C)  Pulse:  [] 72  Resp:  [10-20] 18  SpO2:  [98 %-100 %] 98 %  BP: ()/(0-75) 60/0     Patient Vitals for the past 72 hrs (Last 3 readings):   Weight   03/07/18 0700 68.6 kg (151 lb 3.8 oz)   03/05/18 2000 70.1 kg (154 lb 8.7 oz)     Body mass index is 22.33 kg/m².      Intake/Output Summary (Last 24 hours) at 03/07/18 1258  Last data filed at 03/07/18 0800   Gross per 24 hour   Intake          1638.01 ml   Output             1051 ml   Net           587.01 ml       Hemodynamic Parameters:       Telemetry:     Physical Exam     Constitutional: He is oriented to person, place, and time. He appears well-developed.   Eyes:   Mild conjuctival pallor   Neck: No JVD present.   Cardiovascular:   LVAD hum- smooth   Pulmonary/Chest: Effort normal and breath sounds normal.   Abdominal: Soft.   Musculoskeletal: He exhibits no edema or tenderness.   Neurological: He is alert and oriented to person, place, and time.   Skin: Skin is warm and dry.   Nursing note and vitals  reviewed.    Significant Labs:  CBC:    Recent Labs  Lab 03/06/18 2225 03/07/18  0349 03/07/18  1219   WBC 5.72 5.40  5.40 7.30   RBC 2.75* 2.90*  2.90* 2.89*   HGB 7.5* 7.9*  7.9* 7.9*   HCT 22.8* 24.3*  24.3* 24.6*    150  150 168   MCV 83 84  84 85   MCH 27.3 27.2  27.2 27.3   MCHC 32.9 32.5  32.5 32.1     BNP:    Recent Labs  Lab 03/06/18 0446 03/07/18  0349   * 543*     CMP:    Recent Labs  Lab 03/05/18 2037 03/06/18 0445 03/07/18  0349   GLU 81 73 85   CALCIUM 9.2 9.1 9.1   ALBUMIN 3.4*  --  3.2*   PROT 6.5  --  6.3    138 137   K 4.7 4.3 3.7   CO2 23 22* 23    108 105   BUN 24* 21 18   CREATININE 1.2 1.0 1.1   ALKPHOS 66  --  64   ALT 17  --  12   AST 24  --  20   BILITOT 0.5  --  1.3*      Coagulation:     Recent Labs  Lab 03/05/18 2037 03/06/18 0445 03/07/18 0349   INR 1.9* 1.7* 1.4*   APTT  --  25.8 50.8*     LDH:    Recent Labs  Lab 03/06/18 0445 03/07/18  0349    166     Microbiology:  Microbiology Results (last 7 days)     ** No results found for the last 168 hours. **          I have reviewed all pertinent labs within the past 24 hours.    Estimated Creatinine Clearance: 63.2 mL/min (based on SCr of 1.1 mg/dL).    Diagnostic Results:

## 2018-03-07 NOTE — SUBJECTIVE & OBJECTIVE
No current facility-administered medications on file prior to encounter.      Current Outpatient Prescriptions on File Prior to Encounter   Medication Sig    amLODIPine (NORVASC) 5 MG tablet Take 5 mg by mouth once daily. Take one tablet 5mg by mouth once a day.    dronabinol (MARINOL) 5 MG capsule Take 1 capsule (5 mg total) by mouth 3 (three) times daily before meals.    furosemide (LASIX) 20 MG tablet Take 1 tablet (20 mg total) by mouth 2 (two) times daily.    furosemide (LASIX) 40 MG tablet     lisinopril (PRINIVIL,ZESTRIL) 5 MG tablet Take 1 tablet (5 mg total) by mouth once daily.    magnesium oxide (MAG-OX) 400 mg tablet Take 1 tablet (400 mg total) by mouth 2 (two) times daily.    ondansetron (ZOFRAN-ODT) 4 MG TbDL Take 4 mg by mouth every 6 (six) hours as needed.     pantoprazole (PROTONIX) 40 MG tablet Take 1 tablet (40 mg total) by mouth 2 (two) times daily before meals.    polyethylene glycol (GLYCOLAX) 17 gram/dose powder Take 17 g by mouth daily as needed.    potassium chloride (MICRO-K) 10 MEQ CpSR Take 2 capsules (20 mEq total) by mouth once daily.    pravastatin (PRAVACHOL) 20 MG tablet Take 1 tablet (20 mg total) by mouth every evening.    warfarin (COUMADIN) 5 MG tablet Take 5mg orally daily       Review of patient's allergies indicates:   Allergen Reactions    Pneumococcal 23-killian ps vaccine        Past Medical History:   Diagnosis Date    Arthritis     Cardiomyopathy     CHF (congestive heart failure)     Coronary artery disease     Encounter for blood transfusion     Hyperlipidemia     Hypertension     Hypotension, iatrogenic     Obesity     S/P implantation of automatic cardioverter/defibrillator (AICD)     Ventricular tachycardia      Past Surgical History:   Procedure Laterality Date    ABDOMINAL SURGERY      CARDIAC CATHETERIZATION      CARDIAC DEFIBRILLATOR PLACEMENT      LEFT VENTRICULAR ASSIST DEVICE  07/27/2017     Family History     Problem Relation (Age of  Onset)    Heart disease Mother, Father        Social History Main Topics    Smoking status: Never Smoker    Smokeless tobacco: Never Used    Alcohol use No    Drug use: No    Sexual activity: Not on file     Review of Systems   Constitutional: Negative for activity change, appetite change and fever.   HENT: Negative for congestion, ear discharge and ear pain.    Eyes: Negative for pain and redness.   Respiratory: Negative for cough, choking, chest tightness and shortness of breath.    Cardiovascular: Negative for chest pain and leg swelling.   Gastrointestinal: Positive for blood in stool. Negative for abdominal distention, abdominal pain, constipation, diarrhea and nausea.   Endocrine: Negative for cold intolerance and heat intolerance.   Genitourinary: Negative for difficulty urinating and dysuria.   Musculoskeletal: Negative for arthralgias and back pain.   Skin: Negative for color change.   Allergic/Immunologic: Negative for environmental allergies and immunocompromised state.   Neurological: Negative for dizziness and headaches.   Hematological: Negative for adenopathy.   Psychiatric/Behavioral: Negative for agitation and confusion.     Objective:     Vital Signs (Most Recent):  Temp: 99.4 °F (37.4 °C) (03/07/18 1241)  Pulse: 72 (03/07/18 1241)  Resp: 18 (03/07/18 1241)  BP: (!) 60/0 (03/07/18 0900)  SpO2: 98 % (03/07/18 1241) Vital Signs (24h Range):  Temp:  [98.3 °F (36.8 °C)-99.6 °F (37.6 °C)] 99.4 °F (37.4 °C)  Pulse:  [] 72  Resp:  [10-20] 18  SpO2:  [98 %-100 %] 98 %  BP: ()/(0-75) 60/0     Weight: 68.6 kg (151 lb 3.8 oz)  Body mass index is 22.33 kg/m².    Physical Exam   Constitutional: He is oriented to person, place, and time. He appears well-developed and well-nourished. No distress.   HENT:   Head: Normocephalic and atraumatic.   Eyes: Conjunctivae are normal. Right eye exhibits no discharge. Left eye exhibits no discharge. No scleral icterus.   Neck: Normal range of motion. Neck  supple. No JVD present. No tracheal deviation present.   Cardiovascular: Normal rate and regular rhythm.    Pulmonary/Chest: Effort normal. No respiratory distress.   Abdominal: Soft. He exhibits no distension. There is no tenderness.   Neurological: He is alert and oriented to person, place, and time.   Skin: Skin is warm and dry. No rash noted. He is not diaphoretic. No erythema.       Significant Labs:  CBC:   Recent Labs  Lab 03/07/18  1219   WBC 7.30   RBC 2.89*   HGB 7.9*   HCT 24.6*      MCV 85   MCH 27.3   MCHC 32.1     BMP:   Recent Labs  Lab 03/07/18  0349   GLU 85      K 3.7      CO2 23   BUN 18   CREATININE 1.1   CALCIUM 9.1   MG 2.2       Significant Diagnostics:  I have reviewed all pertinent imaging results/findings within the past 24 hours.

## 2018-03-07 NOTE — INTERVAL H&P NOTE
The patient has been examined and the H&P has been reviewed:    I concur with the findings and no changes have occurred since H&P was written.    RHC R IJ Access  7Fr Sheath  7Fr [place] Catheter      Anesthesia/Surgery risks, benefits and alternative options discussed and understood by patient/family.          Active Hospital Problems    Diagnosis  POA    *GI bleed [K92.2]  Yes    Melena [K92.1]  Yes    Anemia [D64.9]  Yes    Anticoagulation monitoring, INR range 2-3 [Z79.01]  Not Applicable    AICD (automatic cardioverter/defibrillator) present [Z95.810]  Yes    LVAD (left ventricular assist device) present [Z95.811]  Not Applicable    Hepatitis B core antibody positive since 2012 [R76.8]  Yes    Chronic systolic congestive heart failure [I50.22]  Yes    Heart transplant candidate [Z76.82]  Not Applicable    Nonischemic cardiomyopathy [I42.8]  Yes     Per Dr Pearl records    LVEDD 6.2 / LVEF 25 May 2017        Resolved Hospital Problems    Diagnosis Date Resolved POA   No resolved problems to display.

## 2018-03-07 NOTE — NURSING
Notified On call team Dr Negrete about patient back in bed and orders regarding initiating Heparin gtts, and NPO status. Dr Negrete stated he will look into it, and put in necessary orders.

## 2018-03-07 NOTE — ASSESSMENT & PLAN NOTE
- HM III placed on 7/2017  - INR today 1.4 today.   - continue heparin for now until gen surgery makes final recs about ileal ulcer  - Map at 60-80 and care per protocol  - continue GDMT once stable with lisinopril

## 2018-03-07 NOTE — SUBJECTIVE & OBJECTIVE
Review of Systems   Constitutional: Negative for chills and fever.   HENT: Negative for facial swelling, mouth sores and trouble swallowing.    Eyes: Negative for pain and redness.   Respiratory: Negative for cough and shortness of breath.    Cardiovascular: Negative for chest pain and leg swelling.   Gastrointestinal: Negative for abdominal pain, diarrhea and nausea.   Genitourinary: Negative for dysuria and hematuria.   Musculoskeletal: Negative for gait problem and neck stiffness.   Skin: Negative for rash and wound.   Neurological: Negative for seizures and headaches.   Psychiatric/Behavioral: Negative for confusion and hallucinations.       Past Medical History:   Diagnosis Date    Arthritis     Cardiomyopathy     CHF (congestive heart failure)     Coronary artery disease     Encounter for blood transfusion     Hyperlipidemia     Hypertension     Hypotension, iatrogenic     Obesity     S/P implantation of automatic cardioverter/defibrillator (AICD)     Ventricular tachycardia        Past Surgical History:   Procedure Laterality Date    ABDOMINAL SURGERY      CARDIAC CATHETERIZATION      CARDIAC DEFIBRILLATOR PLACEMENT      LEFT VENTRICULAR ASSIST DEVICE  07/27/2017       Family history of liver disease: No    Review of patient's allergies indicates:   Allergen Reactions    Pneumococcal 23-killian ps vaccine        Social History Main Topics    Smoking status: Never Smoker    Smokeless tobacco: Never Used    Alcohol use No    Drug use: No    Sexual activity: Not on file       Prescriptions Prior to Admission   Medication Sig Dispense Refill Last Dose    amLODIPine (NORVASC) 5 MG tablet Take 5 mg by mouth once daily. Take one tablet 5mg by mouth once a day.   Taking    dronabinol (MARINOL) 5 MG capsule Take 1 capsule (5 mg total) by mouth 3 (three) times daily before meals. 90 capsule 5 Taking    furosemide (LASIX) 20 MG tablet Take 1 tablet (20 mg total) by mouth 2 (two) times daily. 60  tablet 11 Taking    furosemide (LASIX) 40 MG tablet    Taking    lisinopril (PRINIVIL,ZESTRIL) 5 MG tablet Take 1 tablet (5 mg total) by mouth once daily. 90 tablet 3 Taking    magnesium oxide (MAG-OX) 400 mg tablet Take 1 tablet (400 mg total) by mouth 2 (two) times daily. 60 tablet 11 Taking    ondansetron (ZOFRAN-ODT) 4 MG TbDL Take 4 mg by mouth every 6 (six) hours as needed.    Taking    pantoprazole (PROTONIX) 40 MG tablet Take 1 tablet (40 mg total) by mouth 2 (two) times daily before meals. 60 tablet 11 Taking    polyethylene glycol (GLYCOLAX) 17 gram/dose powder Take 17 g by mouth daily as needed. 1700 g 3 Taking    potassium chloride (MICRO-K) 10 MEQ CpSR Take 2 capsules (20 mEq total) by mouth once daily. 60 capsule 11 Taking    pravastatin (PRAVACHOL) 20 MG tablet Take 1 tablet (20 mg total) by mouth every evening. 30 tablet 11 Taking    warfarin (COUMADIN) 5 MG tablet Take 5mg orally daily 32 tablet 5 Taking       Objective:     Vital Signs (Most Recent):  Temp: 98.4 °F (36.9 °C) (03/07/18 0833)  Pulse: 77 (03/07/18 0833)  Resp: 16 (03/07/18 0833)  BP: (!) 76/0 (03/07/18 0525)  SpO2: 99 % (03/07/18 0525) Vital Signs (24h Range):  Temp:  [98.2 °F (36.8 °C)-99.6 °F (37.6 °C)] 98.4 °F (36.9 °C)  Pulse:  [] 77  Resp:  [10-20] 16  SpO2:  [90 %-100 %] 99 %  BP: ()/(0-75) 76/0     Weight: 68.6 kg (151 lb 3.8 oz) (03/07/18 0700)  Body mass index is 22.33 kg/m².    Physical Exam   Constitutional: He is oriented to person, place, and time. He appears well-nourished. No distress.   HENT:   Head: Normocephalic and atraumatic.   Eyes: Conjunctivae are normal. No scleral icterus.   Neck: Neck supple.   Cardiovascular:   VAD hum   Pulmonary/Chest: Effort normal and breath sounds normal. No stridor. No respiratory distress.   Abdominal: Soft. He exhibits no distension.   Musculoskeletal: Normal range of motion. He exhibits no tenderness.   Neurological: He is alert and oriented to person, place,  and time. No cranial nerve deficit.   Skin: Skin is warm and dry. No rash noted.   Psychiatric: He has a normal mood and affect. His behavior is normal.   Vitals reviewed.      MELD-Na score: 12 at 3/7/2018  3:49 AM  MELD score: 12 at 3/7/2018  3:49 AM  Calculated from:  Serum Creatinine: 1.1 mg/dL at 3/7/2018  3:49 AM  Serum Sodium: 137 mmol/L at 3/7/2018  3:49 AM  Total Bilirubin: 1.3 mg/dL at 3/7/2018  3:49 AM  INR(ratio): 1.4 at 3/7/2018  3:49 AM  Age: 67 years    Significant Labs:  CBC:   Recent Labs  Lab 03/07/18  0349   WBC 5.40  5.40   RBC 2.90*  2.90*   HGB 7.9*  7.9*   HCT 24.3*  24.3*     150     CMP:   Recent Labs  Lab 03/07/18  0349   GLU 85   CALCIUM 9.1   ALBUMIN 3.2*   PROT 6.3      K 3.7   CO2 23      BUN 18   CREATININE 1.1   ALKPHOS 64   ALT 12   AST 20   BILITOT 1.3*     Coagulation:   Recent Labs  Lab 03/07/18  0349   INR 1.4*   APTT 50.8*       Significant Imaging:  Labs: Reviewed  CT: Reviewed

## 2018-03-07 NOTE — ASSESSMENT & PLAN NOTE
- underwent evaluation by hepatology in the past without final recommendation  - Reviewed by hepatology this am and plan is to repeat HBV DNA if negative list if positive treat. Will await results

## 2018-03-07 NOTE — CONSULTS
Hepatology Note    Consult received. Please see full consult note dated same day.    Please call with any additional concerns /questions    Travon Nielson MD  Gastroenterology / Hepatology Fellow ([id number])  Pager: [phone]

## 2018-03-07 NOTE — HPI
68 y/o M with NICMM, HMIII as DT implanted 7/27/17. Post-op course complicated by Gi bleeding had double balloon enteropscopy in 12/2017 (bleeding ulcer)  and 1/25/2018 no bleeding but ulcer found.  Retrograde DBE on 1/26/18 which was negative. He had done well for about a month with no further episodes of bleeding, and no dark stools. Then this past week he had is labs checked and was found to be anemic again. He was admitted to the hospital and did pass some melanic stool. He had a repeat upper endoscopy where several previously known about polyps were found but none actively bleeding.

## 2018-03-07 NOTE — HPI
This is a 68 y/o NICM, LVAD 7/27/17, recurrent GI bleeding is admitted for melena.  Hepatology is consulted regarding positive HepB serologies in past and question regarding clearance for potential heart transplant.  On review of chart, pt has:  Hep B e Ag negative  Hep B e Ab positive  Hep B surface Ag negative  Hep B surface Ab positive  HBV DNA positive at 17 IU / ml  Hep B core Ab total positive    Normal LFTs. INR elevated in setting of coumadin use.  CT enterography recently without mention of any hepatic disease.

## 2018-03-07 NOTE — ASSESSMENT & PLAN NOTE
Pt with Hep B surface Ab and E Ab positive, indicating serologic conversion.  However, still with evidence of very very mild viremia with HBV DNA of 17.  No evidence of cirrhosis or chronic liver disease.    Pt is ok for transplant listing per hepatology with the recommendations as below.    Recs:  Repeat HBV DNA - I have ordered  IF HBV DNA returns negative, would monitor HBV DNA q 3 months or so especially in the post transplant setting  IF HBV DNA returns positive at any viral load, would start lamivudine 150mg PO daily once patient were to be listed for transplant (does not need treatment in the non-transplant setting but would have low risk for reactivation post transplant thus would treat)    Please call with questions.  We will sign off.

## 2018-03-07 NOTE — PROGRESS NOTES
[facility]  Heart Transplant  Progress Note    Patient Name: Suman Hayden  MRN: [id number]  Admission Date: 3/5/2018  Hospital Length of Stay: 2 days  Attending Physician: Deejay Duff Jr.,*  Primary Care Provider: Joe Ernst MD  Principal Problem:GI bleed    Subjective:     Interval History:     No acute overnight event. No further melena while still on heparin drip. S/P EGD and gastric polypectomy ( non bleeding) yesterday. denies abd pain, melena or dizziness.    Continuous Infusions:   heparin (porcine) in 5 % dex Stopped (03/07/18 1230)     Scheduled Meds:   furosemide  20 mg Oral BID    pantoprazole  40 mg Intravenous BID    pravastatin  20 mg Oral Daily    sodium chloride 0.9%  10 mL Intravenous Q6H     PRN Meds:sodium chloride, sodium chloride, Flushing PICC Protocol **AND** sodium chloride 0.9% **AND** sodium chloride 0.9%    Review of patient's allergies indicates:   Allergen Reactions    Pneumococcal 23-killian ps vaccine      Objective:     Vital Signs (Most Recent):  Temp: 99.4 °F (37.4 °C) (03/07/18 1241)  Pulse: 72 (03/07/18 1241)  Resp: 18 (03/07/18 1241)  BP: (!) 60/0 (03/07/18 0900)  SpO2: 98 % (03/07/18 1241) Vital Signs (24h Range):  Temp:  [98.3 °F (36.8 °C)-99.6 °F (37.6 °C)] 99.4 °F (37.4 °C)  Pulse:  [] 72  Resp:  [10-20] 18  SpO2:  [98 %-100 %] 98 %  BP: ()/(0-75) 60/0     Patient Vitals for the past 72 hrs (Last 3 readings):   Weight   03/07/18 0700 68.6 kg (151 lb 3.8 oz)   03/05/18 2000 70.1 kg (154 lb 8.7 oz)     Body mass index is 22.33 kg/m².      Intake/Output Summary (Last 24 hours) at 03/07/18 1258  Last data filed at 03/07/18 0800   Gross per 24 hour   Intake          1638.01 ml   Output             1051 ml   Net           587.01 ml       Hemodynamic Parameters:       Telemetry:     Physical Exam     Constitutional: He is oriented to person, place, and time. He appears well-developed.   Eyes:   Mild conjuctival pallor   Neck: No JVD  present.   Cardiovascular:   LVAD hum- smooth   Pulmonary/Chest: Effort normal and breath sounds normal.   Abdominal: Soft.   Musculoskeletal: He exhibits no edema or tenderness.   Neurological: He is alert and oriented to person, place, and time.   Skin: Skin is warm and dry.   Nursing note and vitals reviewed.    Significant Labs:  CBC:    Recent Labs  Lab 03/06/18 2225 03/07/18 0349 03/07/18  1219   WBC 5.72 5.40  5.40 7.30   RBC 2.75* 2.90*  2.90* 2.89*   HGB 7.5* 7.9*  7.9* 7.9*   HCT 22.8* 24.3*  24.3* 24.6*    150  150 168   MCV 83 84  84 85   MCH 27.3 27.2  27.2 27.3   MCHC 32.9 32.5  32.5 32.1     BNP:    Recent Labs  Lab 03/06/18 0446 03/07/18  0349   * 543*     CMP:    Recent Labs  Lab 03/05/18 2037 03/06/18 0445 03/07/18  0349   GLU 81 73 85   CALCIUM 9.2 9.1 9.1   ALBUMIN 3.4*  --  3.2*   PROT 6.5  --  6.3    138 137   K 4.7 4.3 3.7   CO2 23 22* 23    108 105   BUN 24* 21 18   CREATININE 1.2 1.0 1.1   ALKPHOS 66  --  64   ALT 17  --  12   AST 24  --  20   BILITOT 0.5  --  1.3*      Coagulation:     Recent Labs  Lab 03/05/18 2037 03/06/18 0445 03/07/18 0349   INR 1.9* 1.7* 1.4*   APTT  --  25.8 50.8*     LDH:    Recent Labs  Lab 03/06/18 0445 03/07/18  0349    166     Microbiology:  Microbiology Results (last 7 days)     ** No results found for the last 168 hours. **          I have reviewed all pertinent labs within the past 24 hours.    Estimated Creatinine Clearance: 63.2 mL/min (based on SCr of 1.1 mg/dL).    Diagnostic Results:        Assessment and Plan:     No notes on file    * GI bleed    - unclear source. Management as above  -        Anemia    - acute and related to GI bleed. Unclear location at the moment  - HB on admission 5.9. S/p 2 PRBC. Currently stable at 7.9  - S/P EGD and gastric polypectomy yesterday. No bleeding source identified  - continue PPI BID push and monitor H/H closely  - general surgery consulted for proximal Ileal ulcer  evaluation and recomendation        Hepatitis B core antibody positive since 2012    - underwent evaluation by hepatology in the past without final recommendation  - Reviewed by hepatology this am and plan is to repeat HBV DNA if negative list if positive treat. Will await results            Heart transplant candidate    - Currently not listed due to pending RHC and Hep B further eval  - await final hepatology recs        LVAD (left ventricular assist device) present    - HM III placed on 7/2017  - INR today 1.4 today.   - continue heparin for now until gen surgery makes final recs about ileal ulcer  - Map at 60-80 and care per protocol  - continue GDMT once stable with lisinopril            Discussed plan of care with Dr. Duff the attending on service    Baljinder Negrete MD  Heart Transplant  [facility]

## 2018-03-07 NOTE — ASSESSMENT & PLAN NOTE
- acute and related to GI bleed. Unclear location at the moment  - HB on admission 5.9. S/p 2 PRBC. Currently stable at 7.5  - S/P EGD and gastric polypectomy yesterday. No bleeding source identified  - continue PPI BID push and monitor H/H closely  - general surgery consulted for proximal Ileal ulcer evaluation and recomendation

## 2018-03-07 NOTE — CONSULTS
[facility]  Hepatology  Consult Note    Patient Name: Suman Hayden  MRN: [id number]  Admission Date: 3/5/2018  Hospital Length of Stay: 2 days  Attending Provider: Deejay Duff Jr.,*   Primary Care Physician: Joe Ernst MD  Principal Problem:GI bleed    Consults  Subjective:     Transplant status: No    HPI:  This is a 66 y/o NICM, LVAD 7/27/17, recurrent GI bleeding is admitted for melena.  Hepatology is consulted regarding positive HepB serologies in past and question regarding clearance for potential heart transplant.  On review of chart, pt has:  Hep B e Ag negative  Hep B e Ab positive  Hep B surface Ag negative  Hep B surface Ab positive  HBV DNA positive at 17 IU / ml  Hep B core Ab total positive    Normal LFTs. INR elevated in setting of coumadin use.  CT enterography recently without mention of any hepatic disease.    Review of Systems   Constitutional: Negative for chills and fever.   HENT: Negative for facial swelling, mouth sores and trouble swallowing.    Eyes: Negative for pain and redness.   Respiratory: Negative for cough and shortness of breath.    Cardiovascular: Negative for chest pain and leg swelling.   Gastrointestinal: Negative for abdominal pain, diarrhea and nausea.   Genitourinary: Negative for dysuria and hematuria.   Musculoskeletal: Negative for gait problem and neck stiffness.   Skin: Negative for rash and wound.   Neurological: Negative for seizures and headaches.   Psychiatric/Behavioral: Negative for confusion and hallucinations.       Past Medical History:   Diagnosis Date    Arthritis     Cardiomyopathy     CHF (congestive heart failure)     Coronary artery disease     Encounter for blood transfusion     Hyperlipidemia     Hypertension     Hypotension, iatrogenic     Obesity     S/P implantation of automatic cardioverter/defibrillator (AICD)     Ventricular tachycardia        Past Surgical History:   Procedure Laterality Date     ABDOMINAL SURGERY      CARDIAC CATHETERIZATION      CARDIAC DEFIBRILLATOR PLACEMENT      LEFT VENTRICULAR ASSIST DEVICE  07/27/2017       Family history of liver disease: No    Review of patient's allergies indicates:   Allergen Reactions    Pneumococcal 23-killian ps vaccine        Social History Main Topics    Smoking status: Never Smoker    Smokeless tobacco: Never Used    Alcohol use No    Drug use: No    Sexual activity: Not on file       Prescriptions Prior to Admission   Medication Sig Dispense Refill Last Dose    amLODIPine (NORVASC) 5 MG tablet Take 5 mg by mouth once daily. Take one tablet 5mg by mouth once a day.   Taking    dronabinol (MARINOL) 5 MG capsule Take 1 capsule (5 mg total) by mouth 3 (three) times daily before meals. 90 capsule 5 Taking    furosemide (LASIX) 20 MG tablet Take 1 tablet (20 mg total) by mouth 2 (two) times daily. 60 tablet 11 Taking    furosemide (LASIX) 40 MG tablet    Taking    lisinopril (PRINIVIL,ZESTRIL) 5 MG tablet Take 1 tablet (5 mg total) by mouth once daily. 90 tablet 3 Taking    magnesium oxide (MAG-OX) 400 mg tablet Take 1 tablet (400 mg total) by mouth 2 (two) times daily. 60 tablet 11 Taking    ondansetron (ZOFRAN-ODT) 4 MG TbDL Take 4 mg by mouth every 6 (six) hours as needed.    Taking    pantoprazole (PROTONIX) 40 MG tablet Take 1 tablet (40 mg total) by mouth 2 (two) times daily before meals. 60 tablet 11 Taking    polyethylene glycol (GLYCOLAX) 17 gram/dose powder Take 17 g by mouth daily as needed. 1700 g 3 Taking    potassium chloride (MICRO-K) 10 MEQ CpSR Take 2 capsules (20 mEq total) by mouth once daily. 60 capsule 11 Taking    pravastatin (PRAVACHOL) 20 MG tablet Take 1 tablet (20 mg total) by mouth every evening. 30 tablet 11 Taking    warfarin (COUMADIN) 5 MG tablet Take 5mg orally daily 32 tablet 5 Taking       Objective:     Vital Signs (Most Recent):  Temp: 98.4 °F (36.9 °C) (03/07/18 0833)  Pulse: 77 (03/07/18 0833)  Resp: 16  (03/07/18 0833)  BP: (!) 76/0 (03/07/18 0525)  SpO2: 99 % (03/07/18 0525) Vital Signs (24h Range):  Temp:  [98.2 °F (36.8 °C)-99.6 °F (37.6 °C)] 98.4 °F (36.9 °C)  Pulse:  [] 77  Resp:  [10-20] 16  SpO2:  [90 %-100 %] 99 %  BP: ()/(0-75) 76/0     Weight: 68.6 kg (151 lb 3.8 oz) (03/07/18 0700)  Body mass index is 22.33 kg/m².    Physical Exam   Constitutional: He is oriented to person, place, and time. He appears well-nourished. No distress.   HENT:   Head: Normocephalic and atraumatic.   Eyes: Conjunctivae are normal. No scleral icterus.   Neck: Neck supple.   Cardiovascular:   VAD hum   Pulmonary/Chest: Effort normal and breath sounds normal. No stridor. No respiratory distress.   Abdominal: Soft. He exhibits no distension.   Musculoskeletal: Normal range of motion. He exhibits no tenderness.   Neurological: He is alert and oriented to person, place, and time. No cranial nerve deficit.   Skin: Skin is warm and dry. No rash noted.   Psychiatric: He has a normal mood and affect. His behavior is normal.   Vitals reviewed.      MELD-Na score: 12 at 3/7/2018  3:49 AM  MELD score: 12 at 3/7/2018  3:49 AM  Calculated from:  Serum Creatinine: 1.1 mg/dL at 3/7/2018  3:49 AM  Serum Sodium: 137 mmol/L at 3/7/2018  3:49 AM  Total Bilirubin: 1.3 mg/dL at 3/7/2018  3:49 AM  INR(ratio): 1.4 at 3/7/2018  3:49 AM  Age: 67 years    Significant Labs:  CBC:   Recent Labs  Lab 03/07/18 0349   WBC 5.40  5.40   RBC 2.90*  2.90*   HGB 7.9*  7.9*   HCT 24.3*  24.3*     150     CMP:   Recent Labs  Lab 03/07/18 0349   GLU 85   CALCIUM 9.1   ALBUMIN 3.2*   PROT 6.3      K 3.7   CO2 23      BUN 18   CREATININE 1.1   ALKPHOS 64   ALT 12   AST 20   BILITOT 1.3*     Coagulation:   Recent Labs  Lab 03/07/18  0349   INR 1.4*   APTT 50.8*       Significant Imaging:  Labs: Reviewed  CT: Reviewed    Assessment/Plan:     Hepatitis B core antibody positive since 2012    Pt with Hep B surface Ab and E Ab positive,  indicating serologic conversion.  However, still with evidence of very very mild viremia with HBV DNA of 17.  No evidence of cirrhosis or chronic liver disease.    Pt is ok for transplant listing per hepatology with the recommendations as below.    Recs:  Repeat HBV DNA - I have ordered  IF HBV DNA returns negative, would monitor HBV DNA q 3 months or so especially in the post transplant setting  IF HBV DNA returns positive at any viral load, would start lamivudine 150mg PO daily once patient were to be listed for transplant (does not need treatment in the non-transplant setting but would have low risk for reactivation post transplant thus would treat)    Please call with questions.  We will sign off.            Thank you for your consult. I will sign off. Please contact us if you have any additional questions.    Travon Nielson MD  Hepatology  [facility]

## 2018-03-08 NOTE — PROGRESS NOTES
PICC unable to draw back adequate amount of blood for hepatitis and cbc. MD notified. Changed pt to lab draw and reordered hepatitis and cbc to be redrawn.

## 2018-03-08 NOTE — PLAN OF CARE
Problem: Patient Care Overview  Goal: Plan of Care Review  Outcome: Ongoing (interventions implemented as appropriate)  Pt verbalized understanding of plan of care. Fall precautions maintained.  Bed locked and low.  Call light and personal items within reach. SR up x 2. VAD sounds smooth, dressing CDI.  Drive line secured with Hand washing/ hand sanitizers encouraged.  Pts wife will do VAD dsg change.

## 2018-03-08 NOTE — ASSESSMENT & PLAN NOTE
- underwent evaluation by hepatology in the past without final recommendation  - Hepatitis B DNA test results pending

## 2018-03-08 NOTE — PLAN OF CARE
Problem: Patient Care Overview  Goal: Plan of Care Review  Outcome: Ongoing (interventions implemented as appropriate)  Patient free of falls/traumas/injuries. Skin clean and dry, driveline site CDI. Heparin gtt infusing. Patient educated on plan of care and verbalized understanding. Patients VS stable and no distress. Will continue to monitor.

## 2018-03-08 NOTE — ASSESSMENT & PLAN NOTE
- Currently not listed due to pending RHC and Hep B further eval  - To be discussed for listing which would make his bleeding risk less as anticoagulation will not be necessary.  - Awaiting HBV DNA results

## 2018-03-08 NOTE — ASSESSMENT & PLAN NOTE
- HM III placed on 7/2017  - INR today 1.4 today.   - continue heparin for per gen surgery just incase Ileal resection is necessary  - Map at 60-80 and care per protocol  - continue GDMT once stable with lisinopril

## 2018-03-08 NOTE — SUBJECTIVE & OBJECTIVE
Interval History:     Denies acute overnight event. Surgery planning for possible ilea resection next week if repeat colonoscopy is negative for source. H/H stable and no further melena.    Continuous Infusions:   heparin (porcine) in 5 % dex 17 Units/kg/hr (03/07/18 1924)     Scheduled Meds:   furosemide  20 mg Oral BID    pantoprazole  40 mg Intravenous BID    pravastatin  20 mg Oral Daily    sodium chloride 0.9%  10 mL Intravenous Q6H     PRN Meds:sodium chloride, sodium chloride, sodium chloride, Flushing PICC Protocol **AND** sodium chloride 0.9% **AND** sodium chloride 0.9%    Review of patient's allergies indicates:   Allergen Reactions    Pneumococcal 23-killian ps vaccine      Objective:     Vital Signs (Most Recent):  Temp: 97.8 °F (36.6 °C) (03/08/18 0744)  Pulse: (!) 57 (03/08/18 0752)  Resp: 16 (03/08/18 0744)  BP: 104/69 (03/08/18 0744)  SpO2: 95 % (03/08/18 0744) Vital Signs (24h Range):  Temp:  [97.8 °F (36.6 °C)-99.4 °F (37.4 °C)] 97.8 °F (36.6 °C)  Pulse:  [] 57  Resp:  [16-20] 16  SpO2:  [95 %-100 %] 95 %  BP: ()/(0-69) 104/69     Patient Vitals for the past 72 hrs (Last 3 readings):   Weight   03/08/18 0700 68.1 kg (150 lb 2.1 oz)   03/07/18 0700 68.6 kg (151 lb 3.8 oz)   03/05/18 2000 70.1 kg (154 lb 8.7 oz)     Body mass index is 22.17 kg/m².      Intake/Output Summary (Last 24 hours) at 03/08/18 1039  Last data filed at 03/08/18 0400   Gross per 24 hour   Intake              530 ml   Output             1700 ml   Net            -1170 ml       Hemodynamic Parameters:       Telemetry:     Physical Exam     Constitutional: He is oriented to person, place, and time. He appears well-developed.   Eyes:   Mild conjuctival pallor   Neck: No JVD present.   Cardiovascular:   LVAD hum- smooth   Pulmonary/Chest: Effort normal and breath sounds normal.   Abdominal: Soft.   Musculoskeletal: He exhibits no edema or tenderness.   Neurological: He is alert and oriented to person, place, and time.    Skin: Skin is warm and dry.   Nursing note and vitals reviewed      Significant Labs:  CBC:    Recent Labs  Lab 03/07/18 2048 03/08/18  0530 03/08/18  0658   WBC 6.73 5.89 8.64   RBC 2.91* 2.24* 3.07*   HGB 7.9* 6.2* 8.4*   HCT 24.7* 19.2* 26.1*    104* 160   MCV 85 86 85   MCH 27.1 27.7 27.4   MCHC 32.0 32.3 32.2     BNP:    Recent Labs  Lab 03/06/18 0446 03/07/18  0349   * 543*     CMP:    Recent Labs  Lab 03/05/18 2037 03/06/18 0445 03/07/18 0349 03/08/18  0530   GLU 81 73 85 87   CALCIUM 9.2 9.1 9.1 9.1   ALBUMIN 3.4*  --  3.2*  --    PROT 6.5  --  6.3  --     138 137 139   K 4.7 4.3 3.7 3.9   CO2 23 22* 23 24    108 105 104   BUN 24* 21 18 17   CREATININE 1.2 1.0 1.1 0.9   ALKPHOS 66  --  64  --    ALT 17  --  12  --    AST 24  --  20  --    BILITOT 0.5  --  1.3*  --       Coagulation:     Recent Labs  Lab 03/06/18 0445 03/07/18 0349 03/08/18  0530   INR 1.7* 1.4* 1.4*   APTT 25.8 50.8* 51.4*     LDH:    Recent Labs  Lab 03/06/18 0445 03/07/18 0349 03/08/18  0530    166 155     Microbiology:  Microbiology Results (last 7 days)     ** No results found for the last 168 hours. **          I have reviewed all pertinent labs within the past 24 hours.      Estimated Creatinine Clearance: 76.7 mL/min (based on SCr of 0.9 mg/dL).    Diagnostic Results:

## 2018-03-08 NOTE — PROGRESS NOTES
[facility]  Heart Transplant  Progress Note    Patient Name: Suman Hayden  MRN: [id number]  Admission Date: 3/5/2018  Hospital Length of Stay: 3 days  Attending Physician: Deejay Duff Jr.,*  Primary Care Provider: Joe Ernst MD  Principal Problem:GI bleed    Subjective:     Interval History:     Denies acute overnight event. Surgery planning for possible ilea resection next week if repeat colonoscopy is negative for source. H/H stable and no further melena.    Continuous Infusions:   heparin (porcine) in 5 % dex 17 Units/kg/hr (03/07/18 1924)     Scheduled Meds:   furosemide  20 mg Oral BID    pantoprazole  40 mg Intravenous BID    pravastatin  20 mg Oral Daily    sodium chloride 0.9%  10 mL Intravenous Q6H     PRN Meds:sodium chloride, sodium chloride, sodium chloride, Flushing PICC Protocol **AND** sodium chloride 0.9% **AND** sodium chloride 0.9%    Review of patient's allergies indicates:   Allergen Reactions    Pneumococcal 23-killian ps vaccine      Objective:     Vital Signs (Most Recent):  Temp: 97.8 °F (36.6 °C) (03/08/18 0744)  Pulse: (!) 57 (03/08/18 0752)  Resp: 16 (03/08/18 0744)  BP: 104/69 (03/08/18 0744)  SpO2: 95 % (03/08/18 0744) Vital Signs (24h Range):  Temp:  [97.8 °F (36.6 °C)-99.4 °F (37.4 °C)] 97.8 °F (36.6 °C)  Pulse:  [] 57  Resp:  [16-20] 16  SpO2:  [95 %-100 %] 95 %  BP: ()/(0-69) 104/69     Patient Vitals for the past 72 hrs (Last 3 readings):   Weight   03/08/18 0700 68.1 kg (150 lb 2.1 oz)   03/07/18 0700 68.6 kg (151 lb 3.8 oz)   03/05/18 2000 70.1 kg (154 lb 8.7 oz)     Body mass index is 22.17 kg/m².      Intake/Output Summary (Last 24 hours) at 03/08/18 1039  Last data filed at 03/08/18 0400   Gross per 24 hour   Intake              530 ml   Output             1700 ml   Net            -1170 ml       Hemodynamic Parameters:       Telemetry:     Physical Exam     Constitutional: He is oriented to person, place, and time. He appears  well-developed.   Eyes:   Mild conjuctival pallor   Neck: No JVD present.   Cardiovascular:   LVAD hum- smooth   Pulmonary/Chest: Effort normal and breath sounds normal.   Abdominal: Soft.   Musculoskeletal: He exhibits no edema or tenderness.   Neurological: He is alert and oriented to person, place, and time.   Skin: Skin is warm and dry.   Nursing note and vitals reviewed      Significant Labs:  CBC:    Recent Labs  Lab 03/07/18 2048 03/08/18  0530 03/08/18  0658   WBC 6.73 5.89 8.64   RBC 2.91* 2.24* 3.07*   HGB 7.9* 6.2* 8.4*   HCT 24.7* 19.2* 26.1*    104* 160   MCV 85 86 85   MCH 27.1 27.7 27.4   MCHC 32.0 32.3 32.2     BNP:    Recent Labs  Lab 03/06/18 0446 03/07/18  0349   * 543*     CMP:    Recent Labs  Lab 03/05/18 2037 03/06/18 0445 03/07/18 0349 03/08/18  0530   GLU 81 73 85 87   CALCIUM 9.2 9.1 9.1 9.1   ALBUMIN 3.4*  --  3.2*  --    PROT 6.5  --  6.3  --     138 137 139   K 4.7 4.3 3.7 3.9   CO2 23 22* 23 24    108 105 104   BUN 24* 21 18 17   CREATININE 1.2 1.0 1.1 0.9   ALKPHOS 66  --  64  --    ALT 17  --  12  --    AST 24  --  20  --    BILITOT 0.5  --  1.3*  --       Coagulation:     Recent Labs  Lab 03/06/18 0445 03/07/18 0349 03/08/18  0530   INR 1.7* 1.4* 1.4*   APTT 25.8 50.8* 51.4*     LDH:    Recent Labs  Lab 03/06/18 0445 03/07/18 0349 03/08/18  0530    166 155     Microbiology:  Microbiology Results (last 7 days)     ** No results found for the last 168 hours. **          I have reviewed all pertinent labs within the past 24 hours.      Estimated Creatinine Clearance: 76.7 mL/min (based on SCr of 0.9 mg/dL).    Diagnostic Results:        Assessment and Plan:     No notes on file    * GI bleed    - unclear source. Management as above  -        Anemia    - acute and related to GI bleed. Unclear location at the moment  - HB on admission 5.9. S/p 2 PRBC. Currently stable at 8.2  - S/P EGD and gastric polypectomy yesterday. No bleeding source  identified  - will undergo repeat C'scope today before consideration for Ilectomy at the ulcer site  - Continue oral iron replacement  - continue PPI BID push and monitor H/H closely          Hepatitis B core antibody positive since 2012    - underwent evaluation by hepatology in the past without final recommendation  - Hepatitis B DNA test results pending          Heart transplant candidate    - Currently not listed due to pending Hep B further eval. RHC cath normal per LVAD parameters  - To be discussed for listing which would make his bleeding risk less as anticoagulation will not be necessary.  - Awaiting HBV DNA results        LVAD (left ventricular assist device) present    - HM III placed on 7/2017  - INR today 1.4 today.   - continue heparin for per gen surgery just incase Ileal resection is necessary  - Map at 60-80 and care per protocol  - continue GDMT once stable with lisinopril            Discussed plan of care with Dr. Duff the attending on service.    Baljinder Negrete MD  Heart Transplant  [facility]

## 2018-03-08 NOTE — CONSULTS
[facility]  General Surgery  Consult Note    Patient Name: Suman Hayden  MRN: [id number]  Code Status: Full Code  Admission Date: 3/5/2018  Hospital Length of Stay: 2 days  Attending Physician: Deejay Duff Jr.,*  Primary Care Provider: Joe Ernst MD    Patient information was obtained from patient, spouse/SO, past medical records and ER records.     Inpatient consult to General Surgery  Consult performed by: CHRISTY CROFT  Consult ordered by: CHRISTEL LOPEZ        Subjective:     Principal Problem: GI bleed    History of Present Illness: 66 y/o M with NICMM, HMIII as DT implanted 7/27/17. Post-op course complicated by Gi bleeding had double balloon enteropscopy in 12/2017 (bleeding ulcer)  and 1/25/2018 no bleeding but ulcer found.  Retrograde DBE on 1/26/18 which was negative. He had done well for about a month with no further episodes of bleeding, and no dark stools. Then this past week he had is labs checked and was found to be anemic again. He was admitted to the hospital and did pass some melanic stool. He had a repeat upper endoscopy where several previously known about polyps were found but none actively bleeding.     No current facility-administered medications on file prior to encounter.      Current Outpatient Prescriptions on File Prior to Encounter   Medication Sig    amLODIPine (NORVASC) 5 MG tablet Take 5 mg by mouth once daily. Take one tablet 5mg by mouth once a day.    dronabinol (MARINOL) 5 MG capsule Take 1 capsule (5 mg total) by mouth 3 (three) times daily before meals.    furosemide (LASIX) 20 MG tablet Take 1 tablet (20 mg total) by mouth 2 (two) times daily.    furosemide (LASIX) 40 MG tablet     lisinopril (PRINIVIL,ZESTRIL) 5 MG tablet Take 1 tablet (5 mg total) by mouth once daily.    magnesium oxide (MAG-OX) 400 mg tablet Take 1 tablet (400 mg total) by mouth 2 (two) times daily.    ondansetron (ZOFRAN-ODT) 4 MG TbDL Take 4 mg by mouth every 6  (six) hours as needed.     pantoprazole (PROTONIX) 40 MG tablet Take 1 tablet (40 mg total) by mouth 2 (two) times daily before meals.    polyethylene glycol (GLYCOLAX) 17 gram/dose powder Take 17 g by mouth daily as needed.    potassium chloride (MICRO-K) 10 MEQ CpSR Take 2 capsules (20 mEq total) by mouth once daily.    pravastatin (PRAVACHOL) 20 MG tablet Take 1 tablet (20 mg total) by mouth every evening.    warfarin (COUMADIN) 5 MG tablet Take 5mg orally daily       Review of patient's allergies indicates:   Allergen Reactions    Pneumococcal 23-killian ps vaccine        Past Medical History:   Diagnosis Date    Arthritis     Cardiomyopathy     CHF (congestive heart failure)     Coronary artery disease     Encounter for blood transfusion     Hyperlipidemia     Hypertension     Hypotension, iatrogenic     Obesity     S/P implantation of automatic cardioverter/defibrillator (AICD)     Ventricular tachycardia      Past Surgical History:   Procedure Laterality Date    ABDOMINAL SURGERY      CARDIAC CATHETERIZATION      CARDIAC DEFIBRILLATOR PLACEMENT      LEFT VENTRICULAR ASSIST DEVICE  07/27/2017     Family History     Problem Relation (Age of Onset)    Heart disease Mother, Father        Social History Main Topics    Smoking status: Never Smoker    Smokeless tobacco: Never Used    Alcohol use No    Drug use: No    Sexual activity: Not on file     Review of Systems   Constitutional: Negative for activity change, appetite change and fever.   HENT: Negative for congestion, ear discharge and ear pain.    Eyes: Negative for pain and redness.   Respiratory: Negative for cough, choking, chest tightness and shortness of breath.    Cardiovascular: Negative for chest pain and leg swelling.   Gastrointestinal: Positive for blood in stool. Negative for abdominal distention, abdominal pain, constipation, diarrhea and nausea.   Endocrine: Negative for cold intolerance and heat intolerance.    Genitourinary: Negative for difficulty urinating and dysuria.   Musculoskeletal: Negative for arthralgias and back pain.   Skin: Negative for color change.   Allergic/Immunologic: Negative for environmental allergies and immunocompromised state.   Neurological: Negative for dizziness and headaches.   Hematological: Negative for adenopathy.   Psychiatric/Behavioral: Negative for agitation and confusion.     Objective:     Vital Signs (Most Recent):  Temp: 99.4 °F (37.4 °C) (03/07/18 1241)  Pulse: 72 (03/07/18 1241)  Resp: 18 (03/07/18 1241)  BP: (!) 60/0 (03/07/18 0900)  SpO2: 98 % (03/07/18 1241) Vital Signs (24h Range):  Temp:  [98.3 °F (36.8 °C)-99.6 °F (37.6 °C)] 99.4 °F (37.4 °C)  Pulse:  [] 72  Resp:  [10-20] 18  SpO2:  [98 %-100 %] 98 %  BP: ()/(0-75) 60/0     Weight: 68.6 kg (151 lb 3.8 oz)  Body mass index is 22.33 kg/m².    Physical Exam   Constitutional: He is oriented to person, place, and time. He appears well-developed and well-nourished. No distress.   HENT:   Head: Normocephalic and atraumatic.   Eyes: Conjunctivae are normal. Right eye exhibits no discharge. Left eye exhibits no discharge. No scleral icterus.   Neck: Normal range of motion. Neck supple. No JVD present. No tracheal deviation present.   Cardiovascular: Normal rate and regular rhythm.    Pulmonary/Chest: Effort normal. No respiratory distress.   Abdominal: Soft. He exhibits no distension. There is no tenderness.   Neurological: He is alert and oriented to person, place, and time.   Skin: Skin is warm and dry. No rash noted. He is not diaphoretic. No erythema.       Significant Labs:  CBC:   Recent Labs  Lab 03/07/18  1219   WBC 7.30   RBC 2.89*   HGB 7.9*   HCT 24.6*      MCV 85   MCH 27.3   MCHC 32.1     BMP:   Recent Labs  Lab 03/07/18  0349   GLU 85      K 3.7      CO2 23   BUN 18   CREATININE 1.1   CALCIUM 9.1   MG 2.2       Significant Diagnostics:  I have reviewed all pertinent imaging  results/findings within the past 24 hours.    Assessment/Plan:     * GI bleed    Presenting with chronic GI bleed, has had capsule study and push enteroscopy which reveals a small bowel ulcer, just proximal to a small bowel stricture that is unable to be traversed.   I think it is reasonable to excise this area of ulceration and stricture, in the absence of any further area of bleeding  There are images form ccapsule endoscopy which mention a bleeding ulcer but on his more recent push enteroscopy there was no active bleeding, biopsy of this area has been benign previously  Would not want to put the patient through the risk of surgery without being sure that there are no other potential source for bleed.   His colon has not been evaluted this admission, and it is unclear how much of the terminal ileum was surveyed on the last push enteroscopy  Given stability at this point  HE would benefit from complete evaluation of the intestine prior to surgical removal of this stricture and ulcer  Will discuss with GI  Plan for operative intervention potentially early next week  Continue to hold coumadin and bridge with heparin            VTE Risk Mitigation         Ordered     heparin 25,000 units in dextrose 5% 250 mL (100 units/mL) infusion (heparin infusion)  Continuous     Route:  Intravenous        03/06/18 4608          Thank you for your consult. I will follow-up with patient. Please contact us if you have any additional questions.    Neil Alexis MD  General Surgery  [facility]

## 2018-03-08 NOTE — ASSESSMENT & PLAN NOTE
- acute and related to GI bleed. Unclear location at the moment  - HB on admission 5.9. S/p 2 PRBC. Currently stable at 8.2  - S/P EGD and gastric polypectomy yesterday. No bleeding source identified  - will undergo repeat C'scope today before consideration for Ilectomy at the ulcer site  - Continue oral iron replacement  - continue PPI BID push and monitor H/H closely

## 2018-03-08 NOTE — ASSESSMENT & PLAN NOTE
Presenting with chronic GI bleed, has had capsule study and push enteroscopy which reveals a small bowel ulcer, just proximal to a small bowel stricture that is unable to be traversed.   I think it is reasonable to excise this area of ulceration and stricture, in the absence of any further area of bleeding  There are images form ccapsule endoscopy which mention a bleeding ulcer but on his more recent push enteroscopy there was no active bleeding, biopsy of this area has been benign previously  Would not want to put the patient through the risk of surgery without being sure that there are no other potential source for bleed.   His colon has not been evaluted this admission, and it is unclear how much of the terminal ileum was surveyed on the last push enteroscopy  Given stability at this point  HE would benefit from complete evaluation of the intestine prior to surgical removal of this stricture and ulcer  Will discuss with GI  Plan for operative intervention potentially early next week  Continue to hold coumadin and bridge with heparin

## 2018-03-09 PROBLEM — I50.42 CHRONIC COMBINED SYSTOLIC AND DIASTOLIC CONGESTIVE HEART FAILURE: Status: ACTIVE | Noted: 2017-07-07

## 2018-03-09 NOTE — ANESTHESIA POSTPROCEDURE EVALUATION
"Anesthesia Post Evaluation    Patient: Suman Hayden    Procedure(s) Performed: Procedure(s) (LRB):  COLONOSCOPY (N/A)    Final Anesthesia Type: general  Patient location during evaluation: PACU  Patient participation: Yes- Able to Participate  Level of consciousness: awake and alert  Post-procedure vital signs: reviewed and stable  Pain management: adequate  Airway patency: patent  PONV status at discharge: No PONV  Anesthetic complications: no      Cardiovascular status: blood pressure returned to baseline and stable  Respiratory status: unassisted  Hydration status: euvolemic  Follow-up not needed.        Visit Vitals  /66 (BP Location: Left arm, Patient Position: Lying)   Pulse 89   Temp 36.4 °C (97.6 °F) (Axillary)   Resp 18   Ht 5' 9" (1.753 m)   Wt 68 kg (150 lb)   SpO2 100%   BMI 22.15 kg/m²       Pain/Gurpreet Score: Pain Assessment Performed: Yes (3/9/2018  8:58 AM)  Presence of Pain: denies (3/9/2018  8:58 AM)      "

## 2018-03-09 NOTE — PROGRESS NOTES
Pt brought to colonoscopy. VAD equipment reconciled, power module, screen, and emergency bag sent with patient. Sent with heparin gtt. Pt stable.

## 2018-03-09 NOTE — ASSESSMENT & PLAN NOTE
- acute and related to GI bleed. Unclear location at the moment  - HB on admission 5.9. S/p 2 PRBC. Currently stable at 8.0  - S/P EGD and gastric polypectomy yesterday and repeat C'scope today without clear source of bleeding noted.  - Continue oral iron replacement  - continue PPI BID push and monitor H/H closely  - final decision on way forward  Pending. Will await surgery recs

## 2018-03-09 NOTE — INTERVAL H&P NOTE
Pre-Procedure H and P Addendum    Patient seen and examined.  History and exam unchanged from prior history and physical.      Procedure: Colonoscopy   Indication: GI bleeding  ASA Class: per anesthesiology  Airway: normal  Neck Mobility: full range of motion  Mallampatti score: per anesthesia  History of anesthesia problems: no  Family history of anesthesia problems: no  Anesthesia Plan: MAC    Anesthesia/Surgery risks, benefits and alternative options discussed and understood by patient/family.          Active Hospital Problems    Diagnosis  POA    *GI bleed [K92.2]  Yes    Melena [K92.1]  Yes    Anemia [D64.9]  Yes    Anticoagulation monitoring, INR range 2-3 [Z79.01]  Not Applicable    AICD (automatic cardioverter/defibrillator) present [Z95.810]  Yes    LVAD (left ventricular assist device) present [Z95.811]  Not Applicable    Hepatitis B core antibody positive since 2012 [R76.8]  Yes    Chronic systolic congestive heart failure [I50.22]  Yes    Heart transplant candidate [Z76.82]  Not Applicable    Nonischemic cardiomyopathy [I42.8]  Yes     Per Dr Pearl records    LVEDD 6.2 / LVEF 25 May 2017        Resolved Hospital Problems    Diagnosis Date Resolved POA   No resolved problems to display.

## 2018-03-09 NOTE — PROGRESS NOTES
Pt back from endo, pt's VSS pt returned with heparin gtt, VAD equipment and emergency bag with patient and reconciled.

## 2018-03-09 NOTE — ASSESSMENT & PLAN NOTE
- underwent evaluation by hepatology in the past without final recommendation  - Repeat HBV Viral DNA quantitative pending

## 2018-03-09 NOTE — PLAN OF CARE
Problem: Patient Care Overview  Goal: Plan of Care Review  Outcome: Ongoing (interventions implemented as appropriate)  Patient free of falls/traumas/injuries. Skin clean and dry. Driveline site to the right abdomen CDI. Heparin gtt infusing. s/p colonoscopy today. Patient tolerated well, pt restarted on blood pressure medications. Patient educated on plan of care and verbalized understanding. Patients VS stable and no distress. Will continue to monitor.

## 2018-03-09 NOTE — PLAN OF CARE
Problem: Patient Care Overview  Goal: Plan of Care Review  Outcome: Ongoing (interventions implemented as appropriate)  See notes for significant events. Pt remained free of falls throughout the shift. Pt on golytely prep for colonoscopy to determine if more clips are needed or if surgery is warranted. Pt has been NPO except for prep Pt POC reviewed with pt and spouse, neither have no questions at this time

## 2018-03-09 NOTE — PROGRESS NOTES
Briefly, informed that patient had vomitted and had low flow VAD alarm. Vitals stable, isolated low flow alarm noted, MAP 82 immediately post low flow alarm. I did a bedside echo and RV size appeared mildly reduced. IVC difficult to visualize. Vad flows back to baseline. Low flow alarm likely in the setting of vomiting with resultant increased intrathoracic pressure with transient reduction in preload. Given patient on bowel regimen, with vomitting, will give IV NS 250cc over an hour.  Discussed with Staff Attending     Gabbi Barajas DO  Cardiology Fellow

## 2018-03-09 NOTE — PROGRESS NOTES
Notified Dr. Negrete of pt's DP 86/0, MD will place orders. Also MD will check if patient can eat. Will continue to monitor.

## 2018-03-09 NOTE — PROVATION PATIENT INSTRUCTIONS
Discharge Summary/Instructions after an Endoscopic Procedure  Patient Name: Suman Hayden  Patient MRN: [id number]  Patient YOB: 1950 Friday, March 09, 2018  Andres Chatterjee MD  RESTRICTIONS:  During your procedure today, you received medications for sedation.  These   medications may affect your judgment, balance and coordination.  Therefore,   for 24 hours, you have the following restrictions:   - DO NOT drive a car, operate machinery, make legal/financial decisions,   sign important papers or drink alcohol.    ACTIVITY:  The following day: return to full activity including work, except no heavy   lifting, straining or running for 3 days if polyps were removed.  DIET:  Eat and drink normally unless instructed otherwise.     TREATMENT FOR COMMON SIDE EFFECTS:  - Mild abdominal pain, nausea, belching, bloating or excessive gas:  rest,   eat lightly and use a heating pad.  - Sore Throat: treat with throat lozenges and/or gargle with warm salt   water.  - Because air was used during the procedure, expelling large amounts of air   from your rectum or belching is normal.  - If a bowel prep was taken, you may not have a bowel movement for 1-3 days.    This is normal.  SYMPTOMS TO WATCH FOR AND REPORT TO YOUR PHYSICIAN:  1. Abdominal pain or bloating, other than gas cramps.  2. Chest pain.  3. Back pain.  4. Signs of infection such as: chills or fever occurring within 24 hours   after the procedure.  5. Rectal bleeding, which would show as bright red, maroon, or black stools.   (A tablespoon of blood from the rectum is not serious, especially if   hemorrhoids are present.)  6. Vomiting.  7. Weakness or dizziness.  GO DIRECTLY TO THE NEAREST EMERGENCY ROOM IF YOU HAVE ANY OF THE FOLLOWING:      Difficulty breathing  Chills and/or fever over 101 F   Persistent vomiting and/or vomiting blood   Severe abdominal pain   Severe chest pain   Black, tarry stools   Bleeding- more than one tablespoon   Any other symptom  or condition that you feel may need urgent attention  Your doctor recommends these additional instructions:  If any biopsies were taken, your doctors clinic will contact you in 1 to 2   weeks with any results.  Advance your diet as tolerated.   Continue your present medications.   Continue taking heparin at your prior dose.  For questions, problems or results please call your physician - Andres Chatterjee MD at Work:  [phone].  [facility], EMERGENCY ROOM PHONE NUMBER: [phone]  IF A COMPLICATION OR EMERGENCY SITUATION ARISES AND YOU ARE UNABLE TO REACH   YOUR PHYSICIAN - GO DIRECTLY TO THE EMERGENCY ROOM.  Andres Chatterjee MD  3/9/2018 10:08:16 AM  This report has been verified and signed electronically.

## 2018-03-09 NOTE — ANESTHESIA PREPROCEDURE EVALUATION
03/09/2018  Suman Hayden is a 67 y.o., male.    Anesthesia Evaluation    I have reviewed the Patient Summary Reports.        Review of Systems  Anesthesia Hx:  No problems with previous Anesthesia    Social:  Non-Smoker    Hematology/Oncology:  Hematology Normal   Oncology Normal     EENT/Dental:EENT/Dental Normal   Cardiovascular:   Hypertension CHF (LVAD, PAP 41)    Pulmonary:  Pulmonary Normal    Renal/:  Renal/ Normal     Hepatic/GI:  Hepatic/GI Normal    Musculoskeletal:  Musculoskeletal Normal    Neurological:  Neurology Normal    Endocrine:  Endocrine Normal    Dermatological:  Skin Normal    Psych:  Psychiatric Normal           Physical Exam  General:  Well nourished    Airway/Jaw/Neck:  Airway Findings: Mouth Opening: Normal Tongue: Normal  General Airway Assessment: Adult  Mallampati: II  Improves to II with phonation.  TM Distance: Normal, at least 6 cm  Jaw/Neck Findings:  Neck ROM: Normal ROM      Dental:  Dental Findings: In tact   Chest/Lungs:  Chest/Lungs Findings: Clear to auscultation, Normal Respiratory Rate     Heart/Vascular:  Heart Findings: Rate: Normal  Rhythm: Regular Rhythm  Sounds: Normal             Anesthesia Plan  Type of Anesthesia, risks & benefits discussed:  Anesthesia Type:  general  Patient's Preference: General  Intra-op Monitoring Plan:   Intra-op Monitoring Plan Comments:   Post Op Pain Control Plan:   Post Op Pain Control Plan Comments:   Induction:   IV  Beta Blocker:  Patient is not currently on a Beta-Blocker (No further documentation required).       Informed Consent: Patient understands risks and agrees with Anesthesia plan.  Questions answered. Anesthesia consent signed with patient.  ASA Score: 4     Day of Surgery Review of History & Physical: I have interviewed and examined the patient. I have reviewed the patient's H&P dated:  There are no significant  changes.          Ready For Surgery From Anesthesia Perspective.

## 2018-03-09 NOTE — ASSESSMENT & PLAN NOTE
- HM III placed on 7/2017  - INR today 1.5 today.   - continue heparin for per gen surgery just incase Ileal resection is necessary  - Map at 60-80 and care per protocol  - continue GDMT once stable with lisinopril  - will restart amlodipine for map goal

## 2018-03-09 NOTE — PROGRESS NOTES
Pt's mag 1.6 and K 3.9. Notified Dr. Negrete of MD armond ordered 1 gram of Mag IVPB once. Order implemented, will continue to monitor.

## 2018-03-09 NOTE — PROGRESS NOTES
Pt complaining of being dizzy, as well as noting pt being diaphoretic and pt stating he is cold. Pt had started vomitting, which was a brown tinged and watery. Pt vitals remained within normal limits.     0040- Called Dr. Barajas to come to beside to assess pt. During to period before MD getting to bedside pt had an alarm. MD arrived at 0048 to bedside. MD orders CBC, CMP, Echo, and chest xray. No further orders at this time will continue to monitor.

## 2018-03-09 NOTE — TRANSFER OF CARE
"Anesthesia Transfer of Care Note    Patient: Suman Hayden    Procedure(s) Performed: Procedure(s) (LRB):  COLONOSCOPY (N/A)    Patient location: PACU    Anesthesia Type: general    Transport from OR: Transported from OR on 6-10 L/min O2 by face mask with adequate spontaneous ventilation    Post pain: adequate analgesia    Post assessment: tolerated procedure well and no apparent anesthetic complications    Post vital signs: stable    Level of consciousness: awake, oriented and alert    Nausea/Vomiting: no nausea/vomiting    Complications: none    Transfer of care protocol was followed      Last vitals:   Visit Vitals  /66 (BP Location: Left arm, Patient Position: Lying)   Pulse 89   Temp 36.4 °C (97.6 °F) (Axillary)   Resp 18   Ht 5' 9" (1.753 m)   Wt 68 kg (150 lb)   SpO2 100%   BMI 22.15 kg/m²     "

## 2018-03-09 NOTE — PROGRESS NOTES
[facility]  Heart Transplant  Progress Note    Patient Name: Suman Hayden  MRN: [id number]  Admission Date: 3/5/2018  Hospital Length of Stay: 4 days  Attending Physician: Deejay Duff Jr.,*  Primary Care Provider: Joe Ernst MD  Principal Problem:GI bleed    Subjective:     Interval History:     Had a low flow alarm this morning during a vomiting episode. Thought to be related to decrease preload secondary to even. Resolved with small fluid bolus and able to undergo colonoscopy without any complication. Telemetry review negative for arrythmia    Continuous Infusions:   heparin (porcine) in 5 % dex 17 Units/kg/hr (03/08/18 8051)     Scheduled Meds:   amLODIPine  5 mg Oral Daily    dronabinol  5 mg Oral TID AC    furosemide  20 mg Oral BID    furosemide  20 mg Oral BID    lisinopril  5 mg Oral Once    [START ON 3/10/2018] lisinopril  5 mg Oral Daily    magnesium oxide  400 mg Oral BID    pantoprazole  40 mg Intravenous BID    potassium chloride        potassium chloride  20 mEq Oral Daily    pravastatin  20 mg Oral Daily    pravastatin  20 mg Oral QHS    sodium chloride 0.9%  10 mL Intravenous Q6H     PRN Meds:sodium chloride, sodium chloride, sodium chloride, ondansetron, Flushing PICC Protocol **AND** sodium chloride 0.9% **AND** sodium chloride 0.9%, sodium chloride 0.9%    Review of patient's allergies indicates:   Allergen Reactions    Pneumococcal 23-killian ps vaccine      Objective:     Vital Signs (Most Recent):  Temp: 97.9 °F (36.6 °C) (03/09/18 1147)  Pulse: 75 (03/09/18 1147)  Resp: 18 (03/09/18 1147)  BP: (!) 86/0 (03/09/18 1200)  SpO2: 99 % (03/09/18 1147) Vital Signs (24h Range):  Temp:  [97 °F (36.1 °C)-98.5 °F (36.9 °C)] 97.9 °F (36.6 °C)  Pulse:  [] 75  Resp:  [16-20] 18  SpO2:  [98 %-100 %] 99 %  BP: ()/(0-78) 86/0     Patient Vitals for the past 72 hrs (Last 3 readings):   Weight   03/09/18 0857 68 kg (150 lb)   03/08/18 0700 68.1 kg (150  lb 2.1 oz)   03/07/18 0700 68.6 kg (151 lb 3.8 oz)     Body mass index is 22.15 kg/m².      Intake/Output Summary (Last 24 hours) at 03/09/18 1235  Last data filed at 03/09/18 0951   Gross per 24 hour   Intake             2210 ml   Output             1225 ml   Net              985 ml       Hemodynamic Parameters:       Telemetry:    Physical Exam    Constitutional: He is oriented to person, place, and time. He appears well-developed.   Eyes:   Mild conjuctival pallor   Neck: No JVD present.   Cardiovascular:   LVAD hum- smooth   Pulmonary/Chest: Effort normal and breath sounds normal.   Abdominal: Soft.   Musculoskeletal: He exhibits no edema or tenderness.   Neurological: He is alert and oriented to person, place, and time.   Skin: Skin is warm and dry.   Nursing note and vitals reviewed    Significant Labs:  CBC:    Recent Labs  Lab 03/08/18  2145 03/09/18  0101 03/09/18  0354   WBC 6.97 10.63 6.57   RBC 3.08* 3.24* 3.00*   HGB 8.3* 8.9* 8.0*   HCT 26.1* 26.9* 25.0*    195 153   MCV 85 83 83   MCH 26.9* 27.5 26.7*   MCHC 31.8* 33.1 32.0     BNP:    Recent Labs  Lab 03/06/18  0446 03/07/18  0349 03/09/18  0354   * 543* 303*     CMP:    Recent Labs  Lab 03/07/18  0349 03/08/18  0530 03/09/18  0101 03/09/18  0354   GLU 85 87 144* 80   CALCIUM 9.1 9.1 9.5 9.3   ALBUMIN 3.2*  --  3.6 3.2*   PROT 6.3  --  6.9 6.3    139 135* 136   K 3.7 3.9 3.4* 3.9   CO2 23 24 23 26    104 101 102   BUN 18 17 15 15   CREATININE 1.1 0.9 1.2 1.0   ALKPHOS 64  --  67 59   ALT 12  --  13 13   AST 20  --  20 19   BILITOT 1.3*  --  1.4* 1.0      Coagulation:     Recent Labs  Lab 03/07/18  0349 03/08/18  0530 03/09/18  0354   INR 1.4* 1.4* 1.3*   APTT 50.8* 51.4* 64.4*     LDH:    Recent Labs  Lab 03/07/18  0349 03/08/18  0530 03/09/18  0354    155 181     Microbiology:  Microbiology Results (last 7 days)     ** No results found for the last 168 hours. **          I have reviewed all pertinent labs within the  past 24 hours.      Estimated Creatinine Clearance: 68.9 mL/min (based on SCr of 1 mg/dL).    Diagnostic Results:      Assessment and Plan:     No notes on file    * GI bleed    - unclear source. Management as above  -        Anemia    - acute and related to GI bleed. Unclear location at the moment  - HB on admission 5.9. S/p 2 PRBC. Currently stable at 8.0  - S/P EGD and gastric polypectomy yesterday and repeat C'scope today without clear source of bleeding noted.  - Continue oral iron replacement  - continue PPI BID push and monitor H/H closely  - final decision on way forward  Pending. Will await surgery recs           Hepatitis B core antibody positive since 2012    - underwent evaluation by hepatology in the past without final recommendation  - Repeat HBV Viral DNA quantitative pending          Heart transplant candidate    - Currently not listed due to HBV DNA that is pending  - To be discussed for listing which would make his bleeding risk less as anticoagulation will not be necessary.          LVAD (left ventricular assist device) present    - HM III placed on 7/2017  - INR today 1.5 today.   - continue heparin for per gen surgery just incase Ileal resection is necessary  - Map at 60-80 and care per protocol  - continue GDMT once stable with lisinopril  - will restart amlodipine for map goal            Discussed plan of care with Dr. Duff the attending on service    Baljinder Negrete MD  Heart Transplant  [facility]

## 2018-03-09 NOTE — ASSESSMENT & PLAN NOTE
- Currently not listed due to HBV DNA that is pending  - To be discussed for listing which would make his bleeding risk less as anticoagulation will not be necessary.

## 2018-03-09 NOTE — SUBJECTIVE & OBJECTIVE
Interval History:     Had a low flow alarm this morning during a vomiting episode. Thought to be related to decrease preload secondary to even. Resolved with small fluid bolus and able to undergo colonoscopy without any complication. Telemetry review negative for arrythmia    Continuous Infusions:   heparin (porcine) in 5 % dex 17 Units/kg/hr (03/08/18 9031)     Scheduled Meds:   amLODIPine  5 mg Oral Daily    dronabinol  5 mg Oral TID AC    furosemide  20 mg Oral BID    furosemide  20 mg Oral BID    lisinopril  5 mg Oral Once    [START ON 3/10/2018] lisinopril  5 mg Oral Daily    magnesium oxide  400 mg Oral BID    pantoprazole  40 mg Intravenous BID    potassium chloride        potassium chloride  20 mEq Oral Daily    pravastatin  20 mg Oral Daily    pravastatin  20 mg Oral QHS    sodium chloride 0.9%  10 mL Intravenous Q6H     PRN Meds:sodium chloride, sodium chloride, sodium chloride, ondansetron, Flushing PICC Protocol **AND** sodium chloride 0.9% **AND** sodium chloride 0.9%, sodium chloride 0.9%    Review of patient's allergies indicates:   Allergen Reactions    Pneumococcal 23-killian ps vaccine      Objective:     Vital Signs (Most Recent):  Temp: 97.9 °F (36.6 °C) (03/09/18 1147)  Pulse: 75 (03/09/18 1147)  Resp: 18 (03/09/18 1147)  BP: (!) 86/0 (03/09/18 1200)  SpO2: 99 % (03/09/18 1147) Vital Signs (24h Range):  Temp:  [97 °F (36.1 °C)-98.5 °F (36.9 °C)] 97.9 °F (36.6 °C)  Pulse:  [] 75  Resp:  [16-20] 18  SpO2:  [98 %-100 %] 99 %  BP: ()/(0-78) 86/0     Patient Vitals for the past 72 hrs (Last 3 readings):   Weight   03/09/18 0857 68 kg (150 lb)   03/08/18 0700 68.1 kg (150 lb 2.1 oz)   03/07/18 0700 68.6 kg (151 lb 3.8 oz)     Body mass index is 22.15 kg/m².      Intake/Output Summary (Last 24 hours) at 03/09/18 1235  Last data filed at 03/09/18 0951   Gross per 24 hour   Intake             2210 ml   Output             1225 ml   Net              985 ml       Hemodynamic  Parameters:       Telemetry:    Physical Exam    Constitutional: He is oriented to person, place, and time. He appears well-developed.   Eyes:   Mild conjuctival pallor   Neck: No JVD present.   Cardiovascular:   LVAD hum- smooth   Pulmonary/Chest: Effort normal and breath sounds normal.   Abdominal: Soft.   Musculoskeletal: He exhibits no edema or tenderness.   Neurological: He is alert and oriented to person, place, and time.   Skin: Skin is warm and dry.   Nursing note and vitals reviewed    Significant Labs:  CBC:    Recent Labs  Lab 03/08/18  2145 03/09/18  0101 03/09/18  0354   WBC 6.97 10.63 6.57   RBC 3.08* 3.24* 3.00*   HGB 8.3* 8.9* 8.0*   HCT 26.1* 26.9* 25.0*    195 153   MCV 85 83 83   MCH 26.9* 27.5 26.7*   MCHC 31.8* 33.1 32.0     BNP:    Recent Labs  Lab 03/06/18  0446 03/07/18  0349 03/09/18  0354   * 543* 303*     CMP:    Recent Labs  Lab 03/07/18  0349 03/08/18  0530 03/09/18  0101 03/09/18  0354   GLU 85 87 144* 80   CALCIUM 9.1 9.1 9.5 9.3   ALBUMIN 3.2*  --  3.6 3.2*   PROT 6.3  --  6.9 6.3    139 135* 136   K 3.7 3.9 3.4* 3.9   CO2 23 24 23 26    104 101 102   BUN 18 17 15 15   CREATININE 1.1 0.9 1.2 1.0   ALKPHOS 64  --  67 59   ALT 12  --  13 13   AST 20  --  20 19   BILITOT 1.3*  --  1.4* 1.0      Coagulation:     Recent Labs  Lab 03/07/18 0349 03/08/18  0530 03/09/18  0354   INR 1.4* 1.4* 1.3*   APTT 50.8* 51.4* 64.4*     LDH:    Recent Labs  Lab 03/07/18 0349 03/08/18  0530 03/09/18  0354    155 181     Microbiology:  Microbiology Results (last 7 days)     ** No results found for the last 168 hours. **          I have reviewed all pertinent labs within the past 24 hours.      Estimated Creatinine Clearance: 68.9 mL/min (based on SCr of 1 mg/dL).    Diagnostic Results:

## 2018-03-09 NOTE — NURSING TRANSFER
Nursing Transfer Note      3/9/2018     Transfer To: 310    Transfer via stretcher    Transfer with cardiac monitoring, LVAD equipment, IV pole    Transported by pct    Medicines sent: yes    Chart send with patient: Yes    Notified: spouse

## 2018-03-10 NOTE — PROGRESS NOTES
Colonoscopy without obvious source of bleeding  Will plan for surgical intervention this coming week  Final date to follow      Baljinder Gastelum MD PGY III  [phone]

## 2018-03-10 NOTE — PROGRESS NOTES
[facility]  Heart Transplant  Progress Note    Patient Name: Suman Hayden  MRN: [id number]  Admission Date: 3/5/2018  Hospital Length of Stay: 5 days  Attending Physician: Deejay Duff Jr.,*  Primary Care Provider: Joe Ernst MD  Principal Problem:GI bleed    Subjective:     Interval History:     No acute overnight event. Denies further melena or dizziness. Repeat C' scope shows no active bleeding site.     Continuous Infusions:   heparin (porcine) in 5 % dex 17 Units/kg/hr (03/09/18 2035)     Scheduled Meds:   amLODIPine  5 mg Oral Daily    dronabinol  5 mg Oral TID AC    furosemide  20 mg Oral BID    lisinopril  5 mg Oral Daily    magnesium oxide  400 mg Oral BID    pantoprazole  40 mg Intravenous BID    potassium chloride  20 mEq Oral Daily    pravastatin  20 mg Oral QHS    sodium chloride 0.9%  10 mL Intravenous Q6H     PRN Meds:sodium chloride, sodium chloride, sodium chloride, ondansetron, Flushing PICC Protocol **AND** sodium chloride 0.9% **AND** sodium chloride 0.9%, sodium chloride 0.9%    Review of patient's allergies indicates:   Allergen Reactions    Pneumococcal 23-killian ps vaccine      Objective:     Vital Signs (Most Recent):  Temp: 97.9 °F (36.6 °C) (03/10/18 1245)  Pulse: 89 (03/10/18 1245)  Resp: 18 (03/10/18 1245)  BP: (!) 64/0 (03/10/18 1245)  SpO2: 100 % (03/10/18 1245) Vital Signs (24h Range):  Temp:  [97.8 °F (36.6 °C)-99.1 °F (37.3 °C)] 97.9 °F (36.6 °C)  Pulse:  [73-98] 89  Resp:  [16-18] 18  SpO2:  [98 %-100 %] 100 %  BP: (60-98)/(0-60) 64/0     Patient Vitals for the past 72 hrs (Last 3 readings):   Weight   03/10/18 0700 69.2 kg (152 lb 8.9 oz)   03/09/18 1500 68.6 kg (151 lb 3.8 oz)   03/09/18 0857 68 kg (150 lb)     Body mass index is 22.53 kg/m².      Intake/Output Summary (Last 24 hours) at 03/10/18 1356  Last data filed at 03/10/18 0600   Gross per 24 hour   Intake             1440 ml   Output             1150 ml   Net              290 ml        Hemodynamic Parameters:       Telemetry:     Physical Exam     Physical Exam    Constitutional: He is oriented to person, place, and time. He appears well-developed.   Eyes:   Mild conjuctival pallor   Neck: No JVD present.   Cardiovascular:   LVAD hum- smooth   Pulmonary/Chest: Effort normal and breath sounds normal.   Abdominal: Soft.   Musculoskeletal: He exhibits no edema or tenderness.   Neurological: He is alert and oriented to person, place, and time.   Skin: Skin is warm and dry.   Nursing note and vitals reviewed    Significant Labs:  CBC:    Recent Labs  Lab 03/09/18  1356 03/09/18  2340 03/10/18  0354   WBC 5.43 5.61 5.19   RBC 2.90* 2.60* 2.73*   HGB 7.9* 7.0* 7.3*   HCT 24.5* 22.2* 22.9*    143* 140*   MCV 85 85 84   MCH 27.2 26.9* 26.7*   MCHC 32.2 31.5* 31.9*     BNP:    Recent Labs  Lab 03/06/18  0446 03/07/18  0349 03/09/18  0354   * 543* 303*     CMP:    Recent Labs  Lab 03/07/18  0349  03/09/18  0101 03/09/18  0354 03/10/18  0354   GLU 85  < > 144* 80 88   CALCIUM 9.1  < > 9.5 9.3 9.1   ALBUMIN 3.2*  --  3.6 3.2*  --    PROT 6.3  --  6.9 6.3  --      < > 135* 136 137   K 3.7  < > 3.4* 3.9 3.9   CO2 23  < > 23 26 27     < > 101 102 104   BUN 18  < > 15 15 16   CREATININE 1.1  < > 1.2 1.0 1.0   ALKPHOS 64  --  67 59  --    ALT 12  --  13 13  --    AST 20  --  20 19  --    BILITOT 1.3*  --  1.4* 1.0  --    < > = values in this interval not displayed.   Coagulation:     Recent Labs  Lab 03/08/18  0530 03/09/18  0354 03/10/18  0354   INR 1.4* 1.3* 1.2   APTT 51.4* 64.4* 77.5*     LDH:    Recent Labs  Lab 03/08/18  0530 03/09/18  0354 03/10/18  0354    181 148     Microbiology:  Microbiology Results (last 7 days)     ** No results found for the last 168 hours. **          I have reviewed all pertinent labs within the past 24 hours.    Estimated Creatinine Clearance: 70.2 mL/min (based on SCr of 1 mg/dL).    Diagnostic Results:  I have reviewed and interpreted all  pertinent imaging results/findings within the past 24 hours.    Assessment and Plan:     No notes on file    * GI bleed    - unclear source. Management as above  -        Anemia    - acute and related to GI bleed. Unclear location at the moment  - HB on admission 5.9. S/p 2 PRBC. Currently stable at 7.3  - S/P EGD and gastric polypectomy yesterday and repeat C'scope today without clear source of bleeding noted.  - Continue oral iron replacement  - continue PPI BID push and monitor H/H closely  - final decision on way forward pending discussion with CTS. Option my be OHT listing to avoid anticoagulation.   - discussed plan with gen guzman (dr. Alicia)          Hepatitis B core antibody positive since 2012    - underwent evaluation by hepatology in the past without final recommendation  - Repeat HBV Viral DNA quantitative pending          Heart transplant candidate    - Currently not listed due to HBV DNA that is pending  - To be discussed for listing which would make his bleeding risk less as anticoagulation will not be necessary.          LVAD (left ventricular assist device) present    - HM III placed on 7/2017  - INR today 1.2 today.   - continue heparin for per gen surgery just incase Ileal resection is necessary  - Map at 60-80 and care per protocol  - continue GDMT once stable with lisinopril  - will restart amlodipine for map goal            discussed plan of care with Dr. Omega Negrete MD  Heart Transplant  [facility]

## 2018-03-10 NOTE — ASSESSMENT & PLAN NOTE
- acute and related to GI bleed. Unclear location at the moment  - HB on admission 5.9. S/p 2 PRBC. Currently stable at 7.3  - S/P EGD and gastric polypectomy yesterday and repeat C'scope today without clear source of bleeding noted.  - Continue oral iron replacement  - continue PPI BID push and monitor H/H closely  - final decision on way forward pending discussion with CTS. Option my be OHT listing to avoid anticoagulation.   - discussed plan with gen guzman (dr. Alicia)

## 2018-03-10 NOTE — PLAN OF CARE
Problem: Patient Care Overview  Goal: Plan of Care Review  Outcome: Ongoing (interventions implemented as appropriate)  Patient free of falls/traumas/injuries. Skin clean and dry. Driveline to the right dressing CDI. Heparin gtt infusing. Surgery to be determined. Patient educated on plan of care and verbalized understanding. Patients VS stable and no distress. Will continue to monitor.

## 2018-03-10 NOTE — PLAN OF CARE
Problem: Patient Care Overview  Goal: Plan of Care Review  Outcome: Ongoing (interventions implemented as appropriate)  No significant events overnight. Pt remained free of falls throughout the shift. Pt Colonoscopy done still no clear source of bleed. Pt awaiting final decision from gen. Surgery.  Pt POC reviewed with pt and spouse, neither have no questions at this time

## 2018-03-10 NOTE — SUBJECTIVE & OBJECTIVE
Interval History:     No acute overnight event. Denies further melena or dizziness. Repeat C' scope shows no active bleeding site.     Continuous Infusions:   heparin (porcine) in 5 % dex 17 Units/kg/hr (03/09/18 2035)     Scheduled Meds:   amLODIPine  5 mg Oral Daily    dronabinol  5 mg Oral TID AC    furosemide  20 mg Oral BID    lisinopril  5 mg Oral Daily    magnesium oxide  400 mg Oral BID    pantoprazole  40 mg Intravenous BID    potassium chloride  20 mEq Oral Daily    pravastatin  20 mg Oral QHS    sodium chloride 0.9%  10 mL Intravenous Q6H     PRN Meds:sodium chloride, sodium chloride, sodium chloride, ondansetron, Flushing PICC Protocol **AND** sodium chloride 0.9% **AND** sodium chloride 0.9%, sodium chloride 0.9%    Review of patient's allergies indicates:   Allergen Reactions    Pneumococcal 23-killian ps vaccine      Objective:     Vital Signs (Most Recent):  Temp: 97.9 °F (36.6 °C) (03/10/18 1245)  Pulse: 89 (03/10/18 1245)  Resp: 18 (03/10/18 1245)  BP: (!) 64/0 (03/10/18 1245)  SpO2: 100 % (03/10/18 1245) Vital Signs (24h Range):  Temp:  [97.8 °F (36.6 °C)-99.1 °F (37.3 °C)] 97.9 °F (36.6 °C)  Pulse:  [73-98] 89  Resp:  [16-18] 18  SpO2:  [98 %-100 %] 100 %  BP: (60-98)/(0-60) 64/0     Patient Vitals for the past 72 hrs (Last 3 readings):   Weight   03/10/18 0700 69.2 kg (152 lb 8.9 oz)   03/09/18 1500 68.6 kg (151 lb 3.8 oz)   03/09/18 0857 68 kg (150 lb)     Body mass index is 22.53 kg/m².      Intake/Output Summary (Last 24 hours) at 03/10/18 1356  Last data filed at 03/10/18 0600   Gross per 24 hour   Intake             1440 ml   Output             1150 ml   Net              290 ml       Hemodynamic Parameters:       Telemetry:     Physical Exam     Physical Exam    Constitutional: He is oriented to person, place, and time. He appears well-developed.   Eyes:   Mild conjuctival pallor   Neck: No JVD present.   Cardiovascular:   LVAD hum- smooth   Pulmonary/Chest: Effort normal and breath  sounds normal.   Abdominal: Soft.   Musculoskeletal: He exhibits no edema or tenderness.   Neurological: He is alert and oriented to person, place, and time.   Skin: Skin is warm and dry.   Nursing note and vitals reviewed    Significant Labs:  CBC:    Recent Labs  Lab 03/09/18  1356 03/09/18  2340 03/10/18  0354   WBC 5.43 5.61 5.19   RBC 2.90* 2.60* 2.73*   HGB 7.9* 7.0* 7.3*   HCT 24.5* 22.2* 22.9*    143* 140*   MCV 85 85 84   MCH 27.2 26.9* 26.7*   MCHC 32.2 31.5* 31.9*     BNP:    Recent Labs  Lab 03/06/18  0446 03/07/18  0349 03/09/18  0354   * 543* 303*     CMP:    Recent Labs  Lab 03/07/18  0349  03/09/18  0101 03/09/18  0354 03/10/18  0354   GLU 85  < > 144* 80 88   CALCIUM 9.1  < > 9.5 9.3 9.1   ALBUMIN 3.2*  --  3.6 3.2*  --    PROT 6.3  --  6.9 6.3  --      < > 135* 136 137   K 3.7  < > 3.4* 3.9 3.9   CO2 23  < > 23 26 27     < > 101 102 104   BUN 18  < > 15 15 16   CREATININE 1.1  < > 1.2 1.0 1.0   ALKPHOS 64  --  67 59  --    ALT 12  --  13 13  --    AST 20  --  20 19  --    BILITOT 1.3*  --  1.4* 1.0  --    < > = values in this interval not displayed.   Coagulation:     Recent Labs  Lab 03/08/18  0530 03/09/18  0354 03/10/18  0354   INR 1.4* 1.3* 1.2   APTT 51.4* 64.4* 77.5*     LDH:    Recent Labs  Lab 03/08/18  0530 03/09/18  0354 03/10/18  0354    181 148     Microbiology:  Microbiology Results (last 7 days)     ** No results found for the last 168 hours. **          I have reviewed all pertinent labs within the past 24 hours.    Estimated Creatinine Clearance: 70.2 mL/min (based on SCr of 1 mg/dL).    Diagnostic Results:  I have reviewed and interpreted all pertinent imaging results/findings within the past 24 hours.

## 2018-03-11 PROBLEM — D62 ACUTE BLOOD LOSS ANEMIA: Status: ACTIVE | Noted: 2018-01-01

## 2018-03-11 NOTE — PROGRESS NOTES
[facility]  Heart Transplant  Progress Note    Patient Name: Suman Hayden  MRN: [id number]  Admission Date: 3/5/2018  Hospital Length of Stay: 6 days  Attending Physician: Deejay Duff Jr.,*  Primary Care Provider: Joe Ernst MD  Principal Problem:GI bleed    Subjective:     Interval History:     No acute overnight events. Informed that Dr. Duff discussed with CTS that surgery can proceed next week. Dr Duff called gen surgery attending () to discuss on this development and left a message for him.    Continuous Infusions:   heparin (porcine) in 5 % dex 17 Units/kg/hr (03/10/18 2040)     Scheduled Meds:   amLODIPine  5 mg Oral Daily    dronabinol  5 mg Oral TID AC    furosemide  20 mg Oral BID    lisinopril  5 mg Oral Daily    magnesium oxide  400 mg Oral BID    pantoprazole  40 mg Intravenous BID    potassium chloride  20 mEq Oral Daily    pravastatin  20 mg Oral QHS    sodium chloride 0.9%  10 mL Intravenous Q6H     PRN Meds:sodium chloride, sodium chloride, sodium chloride, ondansetron, polyethylene glycol, Flushing PICC Protocol **AND** sodium chloride 0.9% **AND** sodium chloride 0.9%, sodium chloride 0.9%    Review of patient's allergies indicates:   Allergen Reactions    Pneumococcal 23-killian ps vaccine      Objective:     Vital Signs (Most Recent):  Temp: 99 °F (37.2 °C) (03/11/18 1244)  Pulse: 83 (03/11/18 1244)  Resp: 20 (03/11/18 1244)  BP: (!) 68/0 (03/11/18 0918)  SpO2: 99 % (03/11/18 1244) Vital Signs (24h Range):  Temp:  [97.9 °F (36.6 °C)-99 °F (37.2 °C)] 99 °F (37.2 °C)  Pulse:  [49-96] 83  Resp:  [17-20] 20  SpO2:  [97 %-99 %] 99 %  BP: (64-91)/(0-53) 68/0     Patient Vitals for the past 72 hrs (Last 3 readings):   Weight   03/11/18 0719 69.8 kg (153 lb 14.1 oz)   03/10/18 0700 69.2 kg (152 lb 8.9 oz)   03/09/18 1500 68.6 kg (151 lb 3.8 oz)     Body mass index is 22.72 kg/m².      Intake/Output Summary (Last 24 hours) at 03/11/18  1442  Last data filed at 03/11/18 0500   Gross per 24 hour   Intake            539.5 ml   Output              300 ml   Net            239.5 ml       Hemodynamic Parameters:       Telemetry:     Physical Exam     Constitutional: He is oriented to person, place, and time. He appears well-developed.   Eyes:   Mild conjuctival pallor   Neck: No JVD present.   Cardiovascular:   LVAD hum- smooth   Pulmonary/Chest: Effort normal and breath sounds normal.   Abdominal: Soft.   Musculoskeletal: He exhibits no edema or tenderness.   Neurological: He is alert and oriented to person, place, and time.   Skin: Skin is warm and dry.   Nursing note and vitals reviewed       Significant Labs:  CBC:    Recent Labs  Lab 03/10/18  2018 03/11/18  0344 03/11/18  1326   WBC 5.67 5.00 5.40   RBC 2.72* 2.75* 2.85*   HGB 7.0* 7.3* 7.6*   HCT 22.7* 23.3* 24.4*   * 138* 156   MCV 84 85 86   MCH 25.7* 26.5* 26.7*   MCHC 30.8* 31.3* 31.1*     BNP:    Recent Labs  Lab 03/06/18  0446 03/07/18  0349 03/09/18  0354   * 543* 303*     CMP:    Recent Labs  Lab 03/07/18  0349  03/09/18  0101 03/09/18  0354 03/10/18  0354 03/11/18  0344   GLU 85  < > 144* 80 88 89   CALCIUM 9.1  < > 9.5 9.3 9.1 9.3   ALBUMIN 3.2*  --  3.6 3.2*  --   --    PROT 6.3  --  6.9 6.3  --   --      < > 135* 136 137 138   K 3.7  < > 3.4* 3.9 3.9 3.9   CO2 23  < > 23 26 27 27     < > 101 102 104 104   BUN 18  < > 15 15 16 15   CREATININE 1.1  < > 1.2 1.0 1.0 1.0   ALKPHOS 64  --  67 59  --   --    ALT 12  --  13 13  --   --    AST 20  --  20 19  --   --    BILITOT 1.3*  --  1.4* 1.0  --   --    < > = values in this interval not displayed.   Coagulation:     Recent Labs  Lab 03/09/18  0354 03/10/18  0354 03/11/18 0344   INR 1.3* 1.2 1.1   APTT 64.4* 77.5* 69.3*     LDH:    Recent Labs  Lab 03/09/18  0354 03/10/18  0354 03/11/18 0344    148 127     Microbiology:  Microbiology Results (last 7 days)     ** No results found for the last 168 hours. **           I have reviewed all pertinent labs within the past 24 hours.      Estimated Creatinine Clearance: 70.8 mL/min (based on SCr of 1 mg/dL).    Diagnostic Results:      Assessment and Plan:     No notes on file    * GI bleed    - unclear source. Management as above  -        Anemia    - acute and related to GI bleed. Unclear location at the moment but thought to be from Ileal ulcer  - HB on admission 5.9. S/p 2 PRBC. Currently stable at 7.6  - S/P EGD and gastric polypectomy yesterday and repeat C'scope today without clear source of bleeding noted.  - GI requested gen surgery to evaluate the proximal ileal ulcer and advise on possible resection  - Dr Duff discussed with Dr. Downing and plan is to proceed with surgery.   - He contacted general surgery () about continuing with surgical procedure next week  - Continue oral iron replacement  - continue PPI BID push and monitor H/H closely  - continue heparin gtt for LVAD          Hepatitis B core antibody positive since 2012    - underwent evaluation by hepatology in the past without final recommendation  - he had positive HBV DNA in 2017 hence need for repeat  - repeat DNA level pending          Heart transplant candidate    - Currently not listed due to HBV DNA that is pending  - To be discussed for listing which would make his bleeding risk less as anticoagulation will not be necessary.          LVAD (left ventricular assist device) present    - HM III placed on 7/2017  - INR today 1.1 today.   - continue heparin for per gen surgery just incase Ileal resection is necessary  - Map at 60-80 and care per protocol  - continue GDMT once stable with lisinopril  - will restart amlodipine for map goal            Discussed plan of care with Dr. Omega Negrete MD  Heart Transplant  [facility]

## 2018-03-11 NOTE — SUBJECTIVE & OBJECTIVE
Interval History:     No acute overnight events. Informed that Dr. Duff discussed with CTS that surgery can proceed next week. Dr Duff called gen surgery attending () to discuss on this development and left a message for him.    Continuous Infusions:   heparin (porcine) in 5 % dex 17 Units/kg/hr (03/10/18 2040)     Scheduled Meds:   amLODIPine  5 mg Oral Daily    dronabinol  5 mg Oral TID AC    furosemide  20 mg Oral BID    lisinopril  5 mg Oral Daily    magnesium oxide  400 mg Oral BID    pantoprazole  40 mg Intravenous BID    potassium chloride  20 mEq Oral Daily    pravastatin  20 mg Oral QHS    sodium chloride 0.9%  10 mL Intravenous Q6H     PRN Meds:sodium chloride, sodium chloride, sodium chloride, ondansetron, polyethylene glycol, Flushing PICC Protocol **AND** sodium chloride 0.9% **AND** sodium chloride 0.9%, sodium chloride 0.9%    Review of patient's allergies indicates:   Allergen Reactions    Pneumococcal 23-killian ps vaccine      Objective:     Vital Signs (Most Recent):  Temp: 99 °F (37.2 °C) (03/11/18 1244)  Pulse: 83 (03/11/18 1244)  Resp: 20 (03/11/18 1244)  BP: (!) 68/0 (03/11/18 0918)  SpO2: 99 % (03/11/18 1244) Vital Signs (24h Range):  Temp:  [97.9 °F (36.6 °C)-99 °F (37.2 °C)] 99 °F (37.2 °C)  Pulse:  [49-96] 83  Resp:  [17-20] 20  SpO2:  [97 %-99 %] 99 %  BP: (64-91)/(0-53) 68/0     Patient Vitals for the past 72 hrs (Last 3 readings):   Weight   03/11/18 0719 69.8 kg (153 lb 14.1 oz)   03/10/18 0700 69.2 kg (152 lb 8.9 oz)   03/09/18 1500 68.6 kg (151 lb 3.8 oz)     Body mass index is 22.72 kg/m².      Intake/Output Summary (Last 24 hours) at 03/11/18 1442  Last data filed at 03/11/18 0500   Gross per 24 hour   Intake            539.5 ml   Output              300 ml   Net            239.5 ml       Hemodynamic Parameters:       Telemetry:     Physical Exam     Constitutional: He is oriented to person, place, and time. He appears well-developed.   Eyes:   Mild  conjuctival pallor   Neck: No JVD present.   Cardiovascular:   LVAD hum- smooth   Pulmonary/Chest: Effort normal and breath sounds normal.   Abdominal: Soft.   Musculoskeletal: He exhibits no edema or tenderness.   Neurological: He is alert and oriented to person, place, and time.   Skin: Skin is warm and dry.   Nursing note and vitals reviewed       Significant Labs:  CBC:    Recent Labs  Lab 03/10/18  2018 03/11/18  0344 03/11/18  1326   WBC 5.67 5.00 5.40   RBC 2.72* 2.75* 2.85*   HGB 7.0* 7.3* 7.6*   HCT 22.7* 23.3* 24.4*   * 138* 156   MCV 84 85 86   MCH 25.7* 26.5* 26.7*   MCHC 30.8* 31.3* 31.1*     BNP:    Recent Labs  Lab 03/06/18  0446 03/07/18 0349 03/09/18  0354   * 543* 303*     CMP:    Recent Labs  Lab 03/07/18 0349 03/09/18  0101 03/09/18  0354 03/10/18  0354 03/11/18  0344   GLU 85  < > 144* 80 88 89   CALCIUM 9.1  < > 9.5 9.3 9.1 9.3   ALBUMIN 3.2*  --  3.6 3.2*  --   --    PROT 6.3  --  6.9 6.3  --   --      < > 135* 136 137 138   K 3.7  < > 3.4* 3.9 3.9 3.9   CO2 23  < > 23 26 27 27     < > 101 102 104 104   BUN 18  < > 15 15 16 15   CREATININE 1.1  < > 1.2 1.0 1.0 1.0   ALKPHOS 64  --  67 59  --   --    ALT 12  --  13 13  --   --    AST 20  --  20 19  --   --    BILITOT 1.3*  --  1.4* 1.0  --   --    < > = values in this interval not displayed.   Coagulation:     Recent Labs  Lab 03/09/18  0354 03/10/18  0354 03/11/18  0344   INR 1.3* 1.2 1.1   APTT 64.4* 77.5* 69.3*     LDH:    Recent Labs  Lab 03/09/18  0354 03/10/18  0354 03/11/18  0344    148 127     Microbiology:  Microbiology Results (last 7 days)     ** No results found for the last 168 hours. **          I have reviewed all pertinent labs within the past 24 hours.      Estimated Creatinine Clearance: 70.8 mL/min (based on SCr of 1 mg/dL).    Diagnostic Results:

## 2018-03-11 NOTE — PLAN OF CARE
Problem: Patient Care Overview  Goal: Plan of Care Review  Outcome: Ongoing (interventions implemented as appropriate)  No significant events overnight. Pt remained free of falls throughout the shift. Pt Colonoscopy done still no clear source of bleed. Pt awaiting surgery date and time from  gen. Surgery.  Pt POC reviewed with pt and spouse, neither have no questions at this time.

## 2018-03-11 NOTE — ASSESSMENT & PLAN NOTE
- acute and related to GI bleed. Unclear location at the moment but thought to be from Ileal ulcer  - HB on admission 5.9. S/p 2 PRBC. Currently stable at 7.3  - S/P EGD and gastric polypectomy yesterday and repeat C'scope today without clear source of bleeding noted.  - GI requested gen surgery to evaluate the proximal ileal ulcer and advise on possible resection  - Dr Duff discussed with Dr. Downing and plan is to proceed with surgery.   - He contacted general surgery () about continuing with surgical procedure next week  - Continue oral iron replacement  - continue PPI BID push and monitor H/H closely  - continue heparin gtt for LVAD

## 2018-03-11 NOTE — ASSESSMENT & PLAN NOTE
- HM III placed on 7/2017  - INR today 1.1 today.   - continue heparin for per gen surgery just incase Ileal resection is necessary  - Map at 60-80 and care per protocol  - continue GDMT once stable with lisinopril  - will restart amlodipine for map goal

## 2018-03-11 NOTE — ASSESSMENT & PLAN NOTE
- underwent evaluation by hepatology in the past without final recommendation  - he had positive HBV DNA in 2017 hence need for repeat  - repeat DNA level pending

## 2018-03-12 NOTE — ASSESSMENT & PLAN NOTE
- Acute and related to GI bleed. Unclear location at the moment but thought to be from Ileal ulcer  - HB on admission 5.9. S/p 2 PRBC. Currently stable at 7.5  - GI requested gen surgery to evaluate the proximal ileal ulcer and advise on possible resection (see below)  - Dr Duff discussed with Dr. Downing and plan is to proceed with surgery.   - Continue oral iron replacement  - continue PPI BID push and monitor H/H closely  - continue heparin gtt for LVAD

## 2018-03-12 NOTE — PLAN OF CARE
Problem: Patient Care Overview  Goal: Plan of Care Review  Outcome: Ongoing (interventions implemented as appropriate)  Patient progressing toward all goals. Plan of care discussed with patient, no questions at this time. Patient ambulating independently, fall precautions in place. Due to have surgery on small bowels tomorrow; NPO at midnight; VS & LVAD numbers WNL. Will monitor.

## 2018-03-12 NOTE — PLAN OF CARE
Problem: Patient Care Overview  Goal: Plan of Care Review  Outcome: Ongoing (interventions implemented as appropriate)  No significant events overnight. Pt remained free of falls throughout the shift.. Pt awaiting surgery date and time from  gen. Surgery, pt and cardiology surgery okayed surgery yesterday. Pt will stay on heparin and will not restart coumadin until after surgery.  Pt POC reviewed with pt and spouse, neither have no questions at this time.

## 2018-03-12 NOTE — ASSESSMENT & PLAN NOTE
- HM III placed on 7/2017 as DT  - Speed 5200  - Last TTE done 11/21/17: LVEDD 6.2, LVEF 10 - 15%, diastolic dysfunction, RV 4.9 and mild - mod depressed, PAS 41, IVC 15, ALONDRA q beat, septum midline  - RHC done 3/7/18 showed low filling pressures, moderate pulmonary HTN and normal CO/CI  - INR today 1.1 today. Coumadin remains on hold  - Continue heparin bridge  - Intermittently pulsatile, but only dopplers documented. Well controlled  - VAD interrogation notable for PI events and occasional speed drops to 4800

## 2018-03-12 NOTE — ASSESSMENT & PLAN NOTE
- Known NSAID-induced ileal ulcer  - Appreciate Gen Surgery's help. To have exp lap and small bowel resection tomorrow

## 2018-03-12 NOTE — PROGRESS NOTES
[facility]  Heart Transplant  Progress Note    Patient Name: Suman Hayden  MRN: [id number]  Admission Date: 3/5/2018  Hospital Length of Stay: 7 days  Attending Physician: Ortega Leos MD  Primary Care Provider: Joe Ernst MD  Principal Problem:Anemia    Subjective:     Interval History: No complaints or overnight events. Reports he is having small bowel surgery tomorrow    Continuous Infusions:   heparin (porcine) in 5 % dex 17 Units/kg/hr (03/12/18 0600)     Scheduled Meds:   amLODIPine  5 mg Oral Daily    dronabinol  5 mg Oral TID AC    furosemide  20 mg Oral BID    lisinopril  5 mg Oral Daily    magnesium oxide  400 mg Oral BID    pantoprazole  40 mg Intravenous BID    potassium chloride  20 mEq Oral Daily    pravastatin  20 mg Oral QHS    sodium chloride 0.9%  10 mL Intravenous Q6H     PRN Meds:sodium chloride, sodium chloride, sodium chloride, ondansetron, polyethylene glycol, Flushing PICC Protocol **AND** sodium chloride 0.9% **AND** sodium chloride 0.9%, sodium chloride 0.9%    Review of patient's allergies indicates:   Allergen Reactions    Pneumococcal 23-killian ps vaccine      Objective:     Vital Signs (Most Recent):  Temp: 98.5 °F (36.9 °C) (03/12/18 1215)  Pulse: 100 (03/12/18 1100)  Resp: 18 (03/12/18 1215)  BP: (!) 76/0 (03/12/18 0800)  SpO2: 98 % (03/12/18 0800) Vital Signs (24h Range):  Temp:  [97.4 °F (36.3 °C)-98.7 °F (37.1 °C)] 98.5 °F (36.9 °C)  Pulse:  [] 100  Resp:  [16-18] 18  SpO2:  [95 %-98 %] 98 %  BP: (68-76)/(0) 76/0     Patient Vitals for the past 72 hrs (Last 3 readings):   Weight   03/12/18 0800 70 kg (154 lb 5.2 oz)   03/11/18 0719 69.8 kg (153 lb 14.1 oz)   03/10/18 0700 69.2 kg (152 lb 8.9 oz)     Body mass index is 22.79 kg/m².      Intake/Output Summary (Last 24 hours) at 03/12/18 1622  Last data filed at 03/12/18 0600   Gross per 24 hour   Intake            656.5 ml   Output             1800 ml   Net          -1143.5 ml        Hemodynamic Parameters:       Physical Exam   Constitutional: He is oriented to person, place, and time. He appears well-developed and well-nourished.   HENT:   Head: Normocephalic and atraumatic.   Eyes: Conjunctivae and EOM are normal. Pupils are equal, round, and reactive to light.   Neck: Normal range of motion. Neck supple. No JVD present. No thyromegaly present.   Cardiovascular:   Smooth VAD hum, intermittently pulsatile   Pulmonary/Chest: Effort normal and breath sounds normal.   Abdominal: Soft. Bowel sounds are normal.   Musculoskeletal: Normal range of motion. He exhibits no edema.   Neurological: He is alert and oriented to person, place, and time.   Skin: Skin is warm and dry. Capillary refill takes 2 to 3 seconds.   Psychiatric: He has a normal mood and affect. His behavior is normal. Judgment and thought content normal.       Significant Labs:  CBC:    Recent Labs  Lab 03/11/18 2032 03/12/18  0307 03/12/18  1410   WBC 5.87 4.84 5.20   RBC 2.65* 2.61* 2.79*   HGB 7.3* 7.0* 7.5*   HCT 22.4* 22.5* 23.6*   * 146* 158   MCV 85 86 85   MCH 27.5 26.8* 26.9*   MCHC 32.6 31.1* 31.8*     BNP:    Recent Labs  Lab 03/07/18  0349 03/09/18  0354 03/12/18  0307   * 303* 185*     CMP:    Recent Labs  Lab 03/09/18  0101 03/09/18  0354 03/10/18  0354 03/11/18  0344 03/12/18  0307   * 80 88 89 87   CALCIUM 9.5 9.3 9.1 9.3 9.2   ALBUMIN 3.6 3.2*  --   --  3.0*   PROT 6.9 6.3  --   --  5.7*   * 136 137 138 137   K 3.4* 3.9 3.9 3.9 4.1   CO2 23 26 27 27 27    102 104 104 103   BUN 15 15 16 15 22   CREATININE 1.2 1.0 1.0 1.0 0.9   ALKPHOS 67 59  --   --  58   ALT 13 13  --   --  9*   AST 20 19  --   --  14   BILITOT 1.4* 1.0  --   --  0.5      Coagulation:     Recent Labs  Lab 03/10/18  0354 03/11/18  0344 03/12/18  0307   INR 1.2 1.1 1.1   APTT 77.5* 69.3* 60.2*     LDH:    Recent Labs  Lab 03/10/18  0354 03/11/18  0344 03/12/18  0307    127 133     Microbiology:  Microbiology  Results (last 7 days)     ** No results found for the last 168 hours. **          I have reviewed all pertinent labs within the past 24 hours.    Estimated Creatinine Clearance: 78.9 mL/min (based on SCr of 0.9 mg/dL).    Diagnostic Results:  I have reviewed all pertinent imaging results/findings within the past 24 hours.    Assessment and Plan:     No notes on file    * Anemia    - Acute and related to GI bleed. Unclear location at the moment but thought to be from Ileal ulcer  - HB on admission 5.9. S/p 2 PRBC. Currently stable at 7.5  - GI requested gen surgery to evaluate the proximal ileal ulcer and advise on possible resection (see below)  - Dr Duff discussed with Dr. Downing and plan is to proceed with surgery.   - Continue oral iron replacement  - continue PPI BID push and monitor H/H closely  - continue heparin gtt for LVAD          GI bleed    - UGI done 3/6/18 showed no obvious source of bleeding. Multiple gastric polyps were resected and retrieved. Path showed no malignancy; helicobacter -ve  - C-scope 3/9/18 without obvious source of bleeding  - Hgb essentially stable. Has had 2 units of blood this admit        Ileum ulcer    - Known NSAID-induced ileal ulcer  - Appreciate Gen Surgery's help. To have exp lap and small bowel resection tomorrow        LVAD (left ventricular assist device) present    - HM III placed on 7/2017 as DT  - Speed 5200  - Last TTE done 11/21/17: LVEDD 6.2, LVEF 10 - 15%, diastolic dysfunction, RV 4.9 and mild - mod depressed, PAS 41, IVC 15, ALONDRA q beat, septum midline  - RHC done 3/7/18 showed low filling pressures, moderate pulmonary HTN and normal CO/CI  - INR today 1.1 today. Coumadin remains on hold  - Continue heparin bridge  - Intermittently pulsatile, but only dopplers documented. Well controlled  - VAD interrogation notable for PI events and occasional speed drops to 4800            Hepatitis B core antibody positive since 2012    - Appreciate Hepatology's help.   -  Hep B viral DNA 3/8/18 < 10              [place] [id number]  Heart Transplant  [facility]

## 2018-03-12 NOTE — PLAN OF CARE
Problem: Patient Care Overview  Goal: Plan of Care Review  Outcome: Ongoing (interventions implemented as appropriate)  Pt free of falls/traumas/injuries. Skin remains clean, dry, and intact. Pt continued on heparin gtt. Anti-xa therapeutic. Pt ambulated and is in good spirits.  Pt re-educated on importance of measuring accurate intake and out put; pt verbalized and demonstrates understanding. Reviewed plan of care with pt; and pt verbalized understanding.  Pt VSS in no distress will continue to monitor.

## 2018-03-12 NOTE — PHYSICIAN QUERY
PT Name: Suman Hayden  MR #: [id number]     Physician Query Form - Documentation Clarification      CDS/: Rashmi Iraheta RN, CCDS             Contact information: [email or web address]    This form is a permanent document in the medical record.     Query Date: March 12, 2018    By submitting this query, we are merely seeking further clarification of documentation. Please utilize your independent clinical judgment when addressing the question(s) below.    The Medical record reflects the following:    Supporting Clinical Findings Location in Medical Record     Nonischemic Cardiomyopathy    Cardiomyopathy   2/19 H/p view only, 3/7 interval h/p    3/6   GI h/p      chronic systolic heart failure secondary to non ischemic cardiomyopathy  underwent Heartmate  3 LVAD implanted as DT therapy 7/27/17    LVEDD 6.2 / LVEF 25 May 2017   3/7 interval h/p          3/7 GI interval h/p                                                                             Doctor, Please specify diagnosis or diagnoses associated with above clinical findings.  Please further specify Cardiomyopathy.    Provider Use Only      (  x  )  Dilated    (    )  Other, please specify___________________                                                                                                                           [  ] Clinically undetermined

## 2018-03-12 NOTE — ASSESSMENT & PLAN NOTE
- UGI done 3/6/18 showed no obvious source of bleeding. Multiple gastric polyps were resected and retrieved. Path showed no malignancy; helicobacter -ve  - C-scope 3/9/18 without obvious source of bleeding  - Hgb essentially stable. Has had 2 units of blood this admit

## 2018-03-12 NOTE — ANESTHESIA PREPROCEDURE EVALUATION
[facility]  Anesthesia Pre-Operative Evaluation         Patient Name: Suman Hayden  YOB: 1950  MRN: [id number]    SUBJECTIVE:     Pre-operative evaluation for Procedure(s) (LRB):  EXPLORATORY-LAPAROTOMY with small bowel resection  (N/A)     03/12/2018    Suman Hayden is a 67 y.o. male w/ a significant PMHx of Hep B, heart failure s/p LVAD 07/2017, and chronic GI bleed s/p scopes with no obvious signs of bleeding who presents for the above procedure.      LDA:   R brachial PICC  VAD      Prev airway:     Present Prior to Hospital Arrival?: No; Placement Date: 01/25/18; Placement Time: 0850; Method of Intubation: Direct laryngoscopy; Inserted by: Anesthesia MD; Airway Device: Endotracheal Tube; Mask Ventilation: Not Attempted; Intubated: Postinduction; Blade: Shahzad #3; Airway Device Size: 7.5; Style: Cuffed; Cuff Inflation: Minimal occlusive pressure; Inflation Amount: 7; Placement Verified By: Auscultation, ETT Condensation, Capnometry; Grade: Grade II; Complicating Factors: Long mandible; Intubation Findings: Positive EtCO2, Bilateral breath sounds, Atraumatic/Condition of teeth unchanged;  Depth of Insertion: 22; Securment: Lips; Complications: None; Breath Sounds: Equal Bilateral; Insertion Attempts: 1; Removal Date: 01/25/18;  Removal Time: 0955    Drips:    heparin (porcine) in 5 % dex 17 Units/kg/hr (03/12/18 0600)         Patient Active Problem List   Diagnosis    Nonischemic cardiomyopathy    Encounter for monitoring amiodarone therapy    Essential hypertension    V-tach    Elevated PSA    Hepatitis B core antibody positive since 2012    Chronic combined systolic and diastolic congestive heart failure    Heart transplant candidate    Hyperbilirubinemia    Atrial tachycardia    Hyperglycemia    AICD (automatic cardioverter/defibrillator) present    LVAD (left ventricular assist device) present    Atrial fibrillation    Heart replaced by heart  assist device    Anticoagulation monitoring, INR range 2-3    Subtherapeutic international normalized ratio (INR)    Constipation    Normocytic anemia    Anemia    Ileum ulcer    GI bleed    Melena    Pure hypercholesterolemia    Acute blood loss anemia       Review of patient's allergies indicates:   Allergen Reactions    Pneumococcal 23-killian ps vaccine        Current Inpatient Medications:   amLODIPine  5 mg Oral Daily    dronabinol  5 mg Oral TID AC    furosemide  20 mg Oral BID    lisinopril  5 mg Oral Daily    magnesium oxide  400 mg Oral BID    pantoprazole  40 mg Intravenous BID    potassium chloride  20 mEq Oral Daily    pravastatin  20 mg Oral QHS    sodium chloride 0.9%  10 mL Intravenous Q6H       No current facility-administered medications on file prior to encounter.      Current Outpatient Prescriptions on File Prior to Encounter   Medication Sig Dispense Refill    amLODIPine (NORVASC) 5 MG tablet Take 5 mg by mouth once daily. Take one tablet 5mg by mouth once a day.      dronabinol (MARINOL) 5 MG capsule Take 1 capsule (5 mg total) by mouth 3 (three) times daily before meals. 90 capsule 5    furosemide (LASIX) 20 MG tablet Take 1 tablet (20 mg total) by mouth 2 (two) times daily. 60 tablet 11    furosemide (LASIX) 40 MG tablet       lisinopril (PRINIVIL,ZESTRIL) 5 MG tablet Take 1 tablet (5 mg total) by mouth once daily. 90 tablet 3    magnesium oxide (MAG-OX) 400 mg tablet Take 1 tablet (400 mg total) by mouth 2 (two) times daily. 60 tablet 11    ondansetron (ZOFRAN-ODT) 4 MG TbDL Take 4 mg by mouth every 6 (six) hours as needed.       pantoprazole (PROTONIX) 40 MG tablet Take 1 tablet (40 mg total) by mouth 2 (two) times daily before meals. 60 tablet 11    polyethylene glycol (GLYCOLAX) 17 gram/dose powder Take 17 g by mouth daily as needed. 1700 g 3    potassium chloride (MICRO-K) 10 MEQ CpSR Take 2 capsules (20 mEq total) by mouth once daily. 60 capsule 11     pravastatin (PRAVACHOL) 20 MG tablet Take 1 tablet (20 mg total) by mouth every evening. 30 tablet 11    warfarin (COUMADIN) 5 MG tablet Take 5mg orally daily 32 tablet 5       Past Surgical History:   Procedure Laterality Date    ABDOMINAL SURGERY      CARDIAC CATHETERIZATION      CARDIAC DEFIBRILLATOR PLACEMENT      CARDIAC DEFIBRILLATOR PLACEMENT      COLONOSCOPY      COLONOSCOPY N/A 3/9/2018    Procedure: COLONOSCOPY;  Surgeon: Andres Chatterjee MD;  Location: [address]);  Service: Endoscopy;  Laterality: N/A;    ESOPHAGOGASTRODUODENOSCOPY      LEFT VENTRICULAR ASSIST DEVICE  07/27/2017       Social History     Social History    Marital status:      Spouse name: N/A    Number of children: N/A    Years of education: N/A     Occupational History    Not on file.     Social History Main Topics    Smoking status: Never Smoker    Smokeless tobacco: Never Used    Alcohol use No    Drug use: No    Sexual activity: Not on file     Other Topics Concern    Not on file     Social History Narrative    No narrative on file       OBJECTIVE:     Vital Signs Range (Last 24H):  Temp:  [36.1 °C (96.9 °F)-37.1 °C (98.7 °F)]   Pulse:  []   Resp:  [16-18]   BP: (68-76)/(0)   SpO2:  [95 %-98 %]       CBC:   Recent Labs      03/12/18   0307  03/12/18   1410   WBC  4.84  5.20   RBC  2.61*  2.79*   HGB  7.0*  7.5*   HCT  22.5*  23.6*   PLT  146*  158   MCV  86  85   MCH  26.8*  26.9*   MCHC  31.1*  31.8*       CMP:   Recent Labs      03/11/18   0344 03/12/18   0307   NA  138  137   K  3.9  4.1   CL  104  103   CO2  27  27   BUN  15  22   CREATININE  1.0  0.9   GLU  89  87   MG  1.8  2.0   PHOS  3.8  3.6   CALCIUM  9.3  9.2   ALBUMIN   --   3.0*   PROT   --   5.7*   ALKPHOS   --   58   ALT   --   9*   AST   --   14   BILITOT   --   0.5       INR:  Recent Labs      03/10/18   0354  03/11/18   0344  03/12/18   0307   INR  1.2  1.1  1.1   APTT  77.5*  69.3*  60.2*       Diagnostic Studies: No  relevant studies.    EK18  Undetermined rhythm  No previous ECGs available    2D ECHO:  Results for orders placed or performed during the hospital encounter of 17   2D echo with color flow doppler   Result Value Ref Range    EF 13 (A) 55 - 65    Mitral Valve Regurgitation TRIVIAL TO MILD     Diastolic Dysfunction Yes (A)     Est. PA Systolic Pressure 41.42 (A)     Tricuspid Valve Regurgitation TRIVIAL          ASSESSMENT/PLAN:         Anesthesia Evaluation    I have reviewed the Patient Summary Reports.    I have reviewed the Nursing Notes.   I have reviewed the Medications.     Review of Systems  Anesthesia Hx:  No problems with previous Anesthesia  History of prior surgery of interest to airway management or planning: heart surgery. Previous anesthesia: General  Procedure performed at an [facility].  Denies Personal Hx of Anesthesia complications.   Social:  Non-Smoker, No Alcohol Use    Hematology/Oncology:     Oncology Normal    -- Anemia:   EENT/Dental:EENT/Dental Normal   Cardiovascular:   Exercise tolerance: poor Pacemaker (AICD, last shock , hematoma over site) Hypertension CAD   CHF NYHA Classification IV S/p LVAD   Pulmonary:  Pulmonary Normal    Renal/:  Renal/ Normal     Hepatic/GI:  Hepatic/GI Normal PUD, GI bleed   Musculoskeletal:  Musculoskeletal Normal    Neurological:  Neurology Normal    Endocrine:  Endocrine Normal    Dermatological:  Skin Normal    Psych:  Psychiatric Normal           Physical Exam  General:  Well nourished    Airway/Jaw/Neck:  Airway Findings: Mouth Opening: Normal Tongue: Normal  General Airway Assessment: Adult  Mallampati: II  Improves to II with phonation.  TM Distance: Normal, at least 6 cm  Jaw/Neck Findings:  Neck ROM: Normal ROM      Dental:  Dental Findings: Upper Dentures, Lower Dentures   Chest/Lungs:  Chest/Lungs Findings: Clear to auscultation, Normal Respiratory Rate     Heart/Vascular:  Heart Findings: Rate: Normal  Rhythm: Regular  Rhythm  Sounds: Normal             Anesthesia Plan  Type of Anesthesia, risks & benefits discussed:  Anesthesia Type:  general  Patient's Preference:   Intra-op Monitoring Plan: arterial line, standard ASA monitors and central line  Intra-op Monitoring Plan Comments:   Post Op Pain Control Plan: multimodal analgesia, IV/PO Opioids PRN and per primary service following discharge from PACU  Post Op Pain Control Plan Comments:   Induction:   IV  Beta Blocker:  Patient is not currently on a Beta-Blocker (No further documentation required).       Informed Consent: Patient understands risks and agrees with Anesthesia plan.  Questions answered. Anesthesia consent signed with patient.  ASA Score: 4     Day of Surgery Review of History & Physical:  There are no significant changes.          Ready For Surgery From Anesthesia Perspective.

## 2018-03-12 NOTE — SUBJECTIVE & OBJECTIVE
Interval History: No complaints or overnight events. Reports he is having small bowel surgery tomorrow    Continuous Infusions:   heparin (porcine) in 5 % dex 17 Units/kg/hr (03/12/18 0600)     Scheduled Meds:   amLODIPine  5 mg Oral Daily    dronabinol  5 mg Oral TID AC    furosemide  20 mg Oral BID    lisinopril  5 mg Oral Daily    magnesium oxide  400 mg Oral BID    pantoprazole  40 mg Intravenous BID    potassium chloride  20 mEq Oral Daily    pravastatin  20 mg Oral QHS    sodium chloride 0.9%  10 mL Intravenous Q6H     PRN Meds:sodium chloride, sodium chloride, sodium chloride, ondansetron, polyethylene glycol, Flushing PICC Protocol **AND** sodium chloride 0.9% **AND** sodium chloride 0.9%, sodium chloride 0.9%    Review of patient's allergies indicates:   Allergen Reactions    Pneumococcal 23-killian ps vaccine      Objective:     Vital Signs (Most Recent):  Temp: 98.5 °F (36.9 °C) (03/12/18 1215)  Pulse: 100 (03/12/18 1100)  Resp: 18 (03/12/18 1215)  BP: (!) 76/0 (03/12/18 0800)  SpO2: 98 % (03/12/18 0800) Vital Signs (24h Range):  Temp:  [97.4 °F (36.3 °C)-98.7 °F (37.1 °C)] 98.5 °F (36.9 °C)  Pulse:  [] 100  Resp:  [16-18] 18  SpO2:  [95 %-98 %] 98 %  BP: (68-76)/(0) 76/0     Patient Vitals for the past 72 hrs (Last 3 readings):   Weight   03/12/18 0800 70 kg (154 lb 5.2 oz)   03/11/18 0719 69.8 kg (153 lb 14.1 oz)   03/10/18 0700 69.2 kg (152 lb 8.9 oz)     Body mass index is 22.79 kg/m².      Intake/Output Summary (Last 24 hours) at 03/12/18 1622  Last data filed at 03/12/18 0600   Gross per 24 hour   Intake            656.5 ml   Output             1800 ml   Net          -1143.5 ml       Hemodynamic Parameters:       Physical Exam   Constitutional: He is oriented to person, place, and time. He appears well-developed and well-nourished.   HENT:   Head: Normocephalic and atraumatic.   Eyes: Conjunctivae and EOM are normal. Pupils are equal, round, and reactive to light.   Neck: Normal range  of motion. Neck supple. No JVD present. No thyromegaly present.   Cardiovascular:   Smooth VAD hum, intermittently pulsatile   Pulmonary/Chest: Effort normal and breath sounds normal.   Abdominal: Soft. Bowel sounds are normal.   Musculoskeletal: Normal range of motion. He exhibits no edema.   Neurological: He is alert and oriented to person, place, and time.   Skin: Skin is warm and dry. Capillary refill takes 2 to 3 seconds.   Psychiatric: He has a normal mood and affect. His behavior is normal. Judgment and thought content normal.       Significant Labs:  CBC:    Recent Labs  Lab 03/11/18 2032 03/12/18 0307 03/12/18  1410   WBC 5.87 4.84 5.20   RBC 2.65* 2.61* 2.79*   HGB 7.3* 7.0* 7.5*   HCT 22.4* 22.5* 23.6*   * 146* 158   MCV 85 86 85   MCH 27.5 26.8* 26.9*   MCHC 32.6 31.1* 31.8*     BNP:    Recent Labs  Lab 03/07/18 0349 03/09/18 0354 03/12/18  0307   * 303* 185*     CMP:    Recent Labs  Lab 03/09/18  0101 03/09/18  0354 03/10/18  0354 03/11/18  0344 03/12/18  0307   * 80 88 89 87   CALCIUM 9.5 9.3 9.1 9.3 9.2   ALBUMIN 3.6 3.2*  --   --  3.0*   PROT 6.9 6.3  --   --  5.7*   * 136 137 138 137   K 3.4* 3.9 3.9 3.9 4.1   CO2 23 26 27 27 27    102 104 104 103   BUN 15 15 16 15 22   CREATININE 1.2 1.0 1.0 1.0 0.9   ALKPHOS 67 59  --   --  58   ALT 13 13  --   --  9*   AST 20 19  --   --  14   BILITOT 1.4* 1.0  --   --  0.5      Coagulation:     Recent Labs  Lab 03/10/18  0354 03/11/18  0344 03/12/18  0307   INR 1.2 1.1 1.1   APTT 77.5* 69.3* 60.2*     LDH:    Recent Labs  Lab 03/10/18  0354 03/11/18  0344 03/12/18  0307    127 133     Microbiology:  Microbiology Results (last 7 days)     ** No results found for the last 168 hours. **          I have reviewed all pertinent labs within the past 24 hours.    Estimated Creatinine Clearance: 78.9 mL/min (based on SCr of 0.9 mg/dL).    Diagnostic Results:  I have reviewed all pertinent imaging results/findings within the  past 24 hours.

## 2018-03-13 NOTE — SUBJECTIVE & OBJECTIVE
Interval History: Hgb down to 6.6 this morning, but feels well and denies melena    Continuous Infusions:   heparin (porcine) in 5 % dex Stopped (03/13/18 0706)     Scheduled Meds:   amLODIPine  5 mg Oral Daily    dronabinol  5 mg Oral TID AC    furosemide  20 mg Oral BID    lisinopril  5 mg Oral Daily    magnesium oxide  400 mg Oral BID    pantoprazole  40 mg Intravenous BID    potassium chloride  20 mEq Oral Daily    pravastatin  20 mg Oral QHS    sodium chloride 0.9%  10 mL Intravenous Q6H     PRN Meds:sodium chloride, sodium chloride, sodium chloride, sodium chloride, acetaminophen, ondansetron, polyethylene glycol, Flushing PICC Protocol **AND** sodium chloride 0.9% **AND** sodium chloride 0.9%, sodium chloride 0.9%    Review of patient's allergies indicates:   Allergen Reactions    Pneumococcal 23-killian ps vaccine      Objective:     Vital Signs (Most Recent):  Temp: 98.6 °F (37 °C) (03/13/18 1227)  Pulse: 80 (03/13/18 1227)  Resp: 18 (03/13/18 1227)  BP: (!) 78/0 (03/13/18 1231)  SpO2: 100 % (03/13/18 1227) Vital Signs (24h Range):  Temp:  [97.8 °F (36.6 °C)-98.8 °F (37.1 °C)] 98.6 °F (37 °C)  Pulse:  [] 80  Resp:  [16-18] 18  SpO2:  [95 %-100 %] 100 %  BP: ()/(0-65) 78/0     Patient Vitals for the past 72 hrs (Last 3 readings):   Weight   03/13/18 1227 74.8 kg (165 lb)   03/13/18 0800 70.8 kg (156 lb 1.4 oz)   03/12/18 0800 70 kg (154 lb 5.2 oz)     Body mass index is 24.37 kg/m².      Intake/Output Summary (Last 24 hours) at 03/13/18 1254  Last data filed at 03/13/18 0600   Gross per 24 hour   Intake              900 ml   Output              900 ml   Net                0 ml       Hemodynamic Parameters:           Physical Exam   Constitutional: He is oriented to person, place, and time. He appears well-developed and well-nourished.   HENT:   Head: Normocephalic and atraumatic.   Eyes: Conjunctivae and EOM are normal. Pupils are equal, round, and reactive to light.   Neck: Normal range  of motion. Neck supple. No JVD present. No thyromegaly present.   Cardiovascular:   RRR with frequent ectopi. Smooth VAD hum, intermittently pulsatile   Pulmonary/Chest: Effort normal and breath sounds normal.   Abdominal: Soft. Bowel sounds are normal.   Musculoskeletal: Normal range of motion. He exhibits no edema.   Neurological: He is alert and oriented to person, place, and time.   Skin: Skin is warm and dry. Capillary refill takes 2 to 3 seconds.   Psychiatric: He has a normal mood and affect. His behavior is normal. Judgment and thought content normal.       Significant Labs:  CBC:    Recent Labs  Lab 03/12/18  1410 03/12/18  2129 03/13/18  0402   WBC 5.20 5.94 4.89   RBC 2.79* 2.68* 2.45*   HGB 7.5* 7.0* 6.6*   HCT 23.6* 22.5* 20.6*    158 137*   MCV 85 84 84   MCH 26.9* 26.1* 26.9*   MCHC 31.8* 31.1* 32.0     BNP:    Recent Labs  Lab 03/07/18  0349 03/09/18  0354 03/12/18  0307   * 303* 185*     CMP:    Recent Labs  Lab 03/09/18  0101 03/09/18  0354  03/11/18  0344 03/12/18  0307 03/13/18  0402   * 80  < > 89 87 96   CALCIUM 9.5 9.3  < > 9.3 9.2 9.4   ALBUMIN 3.6 3.2*  --   --  3.0*  --    PROT 6.9 6.3  --   --  5.7*  --    * 136  < > 138 137 135*   K 3.4* 3.9  < > 3.9 4.1 4.2   CO2 23 26  < > 27 27 25    102  < > 104 103 103   BUN 15 15  < > 15 22 26*   CREATININE 1.2 1.0  < > 1.0 0.9 1.0   ALKPHOS 67 59  --   --  58  --    ALT 13 13  --   --  9*  --    AST 20 19  --   --  14  --    BILITOT 1.4* 1.0  --   --  0.5  --    < > = values in this interval not displayed.   Coagulation:     Recent Labs  Lab 03/11/18  0344 03/12/18  0307 03/13/18  0402   INR 1.1 1.1 1.1   APTT 69.3* 60.2* 70.6*     LDH:    Recent Labs  Lab 03/11/18  0344 03/12/18  0307 03/13/18  0402    133 168     Microbiology:  Microbiology Results (last 7 days)     ** No results found for the last 168 hours. **          I have reviewed all pertinent labs within the past 24 hours.    Estimated Creatinine  Clearance: 71.7 mL/min (based on SCr of 1 mg/dL).    Diagnostic Results:  I have reviewed all pertinent imaging results/findings within the past 24 hours.

## 2018-03-13 NOTE — PROGRESS NOTES
Order received to interrogate ICD for underlying rhythm and disable ICD for surgery.  Pt has Dual chamber ICD set at AAI-DDD 80bpm.  RA paces 7% and RV(which is actually in LV) paces 29%.  Underlying rhythm SR with 1stdegree AVB and PVCs.  8 years on battery.  AF burden 96.9%, max 49 hrs, on Coumadin.  ICD tachy detection and therapies disabled at bedside.  Please call [phone] post op to have ICD restored.

## 2018-03-13 NOTE — NURSING TRANSFER
Nursing Transfer Note      3/13/2018     Transfer To: Surgery    Transfer via stretcher    Transfer with cardiac monitoring    Transported by RN and Transport    Medicines sent: Heparin and RBC    Chart send with patient: Yes    Notified: spouse

## 2018-03-13 NOTE — PROGRESS NOTES
Pt received from OR per anesthesia, connected to bedside tele monitor. All VSS. LVAD numbers stable. MD @ bedside. All new orders received and carried out. See flowsheet for full assessment.    Skin note:  Skin clean, dry, intact.     Pt came from OR with biopatch and tegaderm to LVAD site placed per OR staff.   dsg changed per ICU RN using sterile technique. Insertion site clean, dry, and intact.

## 2018-03-13 NOTE — ASSESSMENT & PLAN NOTE
- Acute and related to GI bleed. Unclear location at the moment but thought to be from Ileal ulcer  - HB on admission 5.9. S/p 2 PRBC. Has trended down again at 6.6 - giving 2 more units this morning  - GI requested gen surgery to evaluate the proximal ileal ulcer and advise on possible resection (see below)  - Dr Duff discussed with Dr. Downing and plan is to proceed with surgery.   - Continue oral iron replacement  - continue PPI BID push and monitor H/H closely  - continue heparin gtt for LVAD

## 2018-03-13 NOTE — TRANSFER OF CARE
"Anesthesia Transfer of Care Note    Patient: Suman Hayden    Procedure(s) Performed: Procedure(s) (LRB):  EXPLORATORY-LAPAROTOMY with small bowel resection  (N/A)  RESECTION-BOWEL    Patient location: ICU    Anesthesia Type: general    Transport from OR: Transported from OR on 6-10 L/min O2 by face mask with adequate spontaneous ventilation. Continuous ECG monitoring in transport. Continuous SpO2 monitoring in transport. Continuos invasive BP monitoring in transport    Post pain: adequate analgesia    Post assessment: no apparent anesthetic complications    Post vital signs: stable    Level of consciousness: lethargic    Nausea/Vomiting: no nausea/vomiting    Complications: none    Transfer of care protocol was followed      Last vitals:   Visit Vitals  BP (!) 78/0   Pulse 80   Temp 37 °C (98.6 °F) (Oral)   Resp 18   Ht 5' 9" (1.753 m)   Wt 74.8 kg (165 lb)   SpO2 100%   BMI 24.37 kg/m²     "

## 2018-03-13 NOTE — SUBJECTIVE & OBJECTIVE
Interval History: No acute events overnight  NPO since midnight for procedure today    Medications:  Continuous Infusions:   heparin (porcine) in 5 % dex 17 Units/kg/hr (03/12/18 1807)     Scheduled Meds:   amLODIPine  5 mg Oral Daily    dronabinol  5 mg Oral TID AC    furosemide  20 mg Oral BID    lisinopril  5 mg Oral Daily    magnesium oxide  400 mg Oral BID    pantoprazole  40 mg Intravenous BID    potassium chloride  20 mEq Oral Daily    pravastatin  20 mg Oral QHS    sodium chloride 0.9%  10 mL Intravenous Q6H     PRN Meds:sodium chloride, sodium chloride, sodium chloride, sodium chloride, acetaminophen, ondansetron, polyethylene glycol, Flushing PICC Protocol **AND** sodium chloride 0.9% **AND** sodium chloride 0.9%, sodium chloride 0.9%     Review of patient's allergies indicates:   Allergen Reactions    Pneumococcal 23-killian ps vaccine      Objective:     Vital Signs (Most Recent):  Temp: 98.6 °F (37 °C) (03/13/18 0504)  Pulse: 62 (03/13/18 0504)  Resp: 16 (03/13/18 0504)  BP: (!) 68/0 (03/13/18 0504)  SpO2: 99 % (03/13/18 0504) Vital Signs (24h Range):  Temp:  [97.6 °F (36.4 °C)-98.8 °F (37.1 °C)] 98.6 °F (37 °C)  Pulse:  [] 62  Resp:  [16-18] 16  SpO2:  [98 %-99 %] 99 %  BP: (62-76)/(0) 68/0     Weight: 70 kg (154 lb 5.2 oz)  Body mass index is 22.79 kg/m².    Intake/Output - Last 3 Shifts       03/11 0700 - 03/12 0659 03/12 0700 - 03/13 0659 03/13 0700 - 03/14 0659    P.O. 720 1860     I.V. (mL/kg) 416.5 (6)      Total Intake(mL/kg) 1136.5 (16.3) 1860 (26.6)     Urine (mL/kg/hr) 2500 (1.5) 1175 (0.7)     Stool  0 (0)     Total Output 2500 1175      Net -1363.5 +685             Stool Occurrence  1 x           Physical Exam   Constitutional: He is oriented to person, place, and time. He appears well-developed and well-nourished. No distress.   HENT:   Head: Normocephalic and atraumatic.   Cardiovascular: Normal rate and regular rhythm.    Pulmonary/Chest: Effort normal. No respiratory  distress.   Abdominal: Soft. He exhibits no distension. There is no tenderness.   Neurological: He is alert and oriented to person, place, and time.   Skin: Skin is warm and dry. No rash noted. He is not diaphoretic. No erythema.   Psychiatric: He has a normal mood and affect.       Significant Labs:  CBC:   Recent Labs  Lab 03/13/18 0402   WBC 4.89   RBC 2.45*   HGB 6.6*   HCT 20.6*   *   MCV 84   MCH 26.9*   MCHC 32.0     CMP:   Recent Labs  Lab 03/12/18  0307 03/13/18  0402   GLU 87 96   CALCIUM 9.2 9.4   ALBUMIN 3.0*  --    PROT 5.7*  --     135*   K 4.1 4.2   CO2 27 25    103   BUN 22 26*   CREATININE 0.9 1.0   ALKPHOS 58  --    ALT 9*  --    AST 14  --    BILITOT 0.5  --      Coagulation:   Recent Labs  Lab 03/13/18  0402   LABPROT 11.2   INR 1.1   APTT 70.6*       Significant Diagnostics:  No new imaging

## 2018-03-13 NOTE — PROGRESS NOTES
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 Post-op course complicated by Gi bleeding had double balloon enteropscopy in 12/2017 (bleeding ulcer)  and 1/25/2018 no bleeding but ulcer found.  Retrograde DBE on 1/26/18 which was negative. He had done well for about a month with no further episodes of bleeding, and no dark stools. Then this past week he had is labs checked and was found to be anemic again. He was admitted to the hospital and did pass some melanic stool. He had a repeat upper endoscopy where several previously known about polyps were found but none actively bleeding.     Post-Op Info:  Procedure(s) (LRB):  COLONOSCOPY (N/A)   4 Days Post-Op     Interval History: No acute events overnight  NPO since midnight for procedure today    Medications:  Continuous Infusions:   heparin (porcine) in 5 % dex 17 Units/kg/hr (03/12/18 1807)     Scheduled Meds:   amLODIPine  5 mg Oral Daily    dronabinol  5 mg Oral TID AC    furosemide  20 mg Oral BID    lisinopril  5 mg Oral Daily    magnesium oxide  400 mg Oral BID    pantoprazole  40 mg Intravenous BID    potassium chloride  20 mEq Oral Daily    pravastatin  20 mg Oral QHS    sodium chloride 0.9%  10 mL Intravenous Q6H     PRN Meds:sodium chloride, sodium chloride, sodium chloride, sodium chloride, acetaminophen, ondansetron, polyethylene glycol, Flushing PICC Protocol **AND** sodium chloride 0.9% **AND** sodium chloride 0.9%, sodium chloride 0.9%     Review of patient's allergies indicates:   Allergen Reactions    Pneumococcal 23-killian ps vaccine      Objective:     Vital Signs (Most Recent):  Temp: 98.6 °F (37 °C) (03/13/18 0504)  Pulse: 62 (03/13/18 0504)  Resp: 16 (03/13/18 0504)  BP: (!) 68/0 (03/13/18 0504)  SpO2: 99 % (03/13/18 0504) Vital Signs (24h Range):  Temp:  [97.6 °F (36.4 °C)-98.8 °F (37.1 °C)] 98.6 °F (37 °C)  Pulse:   [] 62  Resp:  [16-18] 16  SpO2:  [98 %-99 %] 99 %  BP: (62-76)/(0) 68/0     Weight: 70 kg (154 lb 5.2 oz)  Body mass index is 22.79 kg/m².    Intake/Output - Last 3 Shifts       03/11 0700 - 03/12 0659 03/12 0700 - 03/13 0659 03/13 0700 - 03/14 0659    P.O. 720 1860     I.V. (mL/kg) 416.5 (6)      Total Intake(mL/kg) 1136.5 (16.3) 1860 (26.6)     Urine (mL/kg/hr) 2500 (1.5) 1175 (0.7)     Stool  0 (0)     Total Output 2500 1175      Net -1363.5 +685             Stool Occurrence  1 x           Physical Exam   Constitutional: He is oriented to person, place, and time. He appears well-developed and well-nourished. No distress.   HENT:   Head: Normocephalic and atraumatic.   Cardiovascular: Normal rate and regular rhythm.    Pulmonary/Chest: Effort normal. No respiratory distress.   Abdominal: Soft. He exhibits no distension. There is no tenderness.   Neurological: He is alert and oriented to person, place, and time.   Skin: Skin is warm and dry. No rash noted. He is not diaphoretic. No erythema.   Psychiatric: He has a normal mood and affect.       Significant Labs:  CBC:   Recent Labs  Lab 03/13/18  0402   WBC 4.89   RBC 2.45*   HGB 6.6*   HCT 20.6*   *   MCV 84   MCH 26.9*   MCHC 32.0     CMP:   Recent Labs  Lab 03/12/18  0307 03/13/18  0402   GLU 87 96   CALCIUM 9.2 9.4   ALBUMIN 3.0*  --    PROT 5.7*  --     135*   K 4.1 4.2   CO2 27 25    103   BUN 22 26*   CREATININE 0.9 1.0   ALKPHOS 58  --    ALT 9*  --    AST 14  --    BILITOT 0.5  --      Coagulation:   Recent Labs  Lab 03/13/18  0402   LABPROT 11.2   INR 1.1   APTT 70.6*       Significant Diagnostics:  No new imaging    Assessment/Plan:     GI bleed    Presenting with chronic GI bleed, has had capsule study and push enteroscopy which reveals a small bowel ulcer, just proximal to a small bowel stricture that is unable to be traversed.     To OR for ex-lap with SBR  Hold heparin gtt on call to OR              Baljinder Uribe,  MD  General Surgery  [facility]

## 2018-03-13 NOTE — ANESTHESIA POSTPROCEDURE EVALUATION
"Anesthesia Post Evaluation    Patient: Suman Hayden    Procedure(s) Performed: Procedure(s) (LRB):  EXPLORATORY-LAPAROTOMY with small bowel resection  (N/A)  RESECTION-BOWEL    Final Anesthesia Type: general  Patient location during evaluation: ICU  Patient participation: Yes- Able to Participate  Level of consciousness: awake and alert  Post-procedure vital signs: reviewed and stable  Pain management: adequate  Airway patency: patent  PONV status at discharge: No PONV  Anesthetic complications: no      Cardiovascular status: blood pressure returned to baseline  Respiratory status: unassisted  Hydration status: euvolemic  Follow-up not needed.        Visit Vitals  /62   Pulse 80   Temp 37 °C (98.6 °F) (Oral)   Resp 18   Ht 5' 9" (1.753 m)   Wt 74.8 kg (165 lb)   SpO2 100%   BMI 24.37 kg/m²       Pain/Gurpreet Score: Pain Assessment Performed: Yes (3/13/2018  3:00 PM)  Presence of Pain: complains of pain/discomfort (3/13/2018  3:00 PM)  Pain Rating Prior to Med Admin: 10 (3/13/2018  2:39 PM)  Pain Rating Post Med Admin: 5 (3/13/2018  3:04 PM)      "

## 2018-03-13 NOTE — PROGRESS NOTES
Pt received from OR on on simple face mask with out incident.  Pt placed on 3L NC.  Pt tolerated well.  Will continue to monitor.

## 2018-03-13 NOTE — ASSESSMENT & PLAN NOTE
- HM III placed on 7/2017 as DT  - Speed 5200  - Last TTE done 11/21/17: LVEDD 6.2, LVEF 10 - 15%, diastolic dysfunction, RV 4.9 and mild - mod depressed, PAS 41, IVC 15, ALONDRA q beat, septum midline  - RHC done 3/7/18 showed low filling pressures, moderate pulmonary HTN and normal CO/CI  - INR today 1.1 today. Coumadin remains on hold  - Continue heparin bridge  - Pulsatile; MAP's well-cotrolled  - VAD interrogation notable for PI events and occasional speed drops to 4800

## 2018-03-13 NOTE — BRIEF OP NOTE
[facility]  Brief Operative Note    SUMMARY     Surgery Date: 3/13/2018     Surgeon(s) and Role:     * Neil Alexis MD - Resident - Assisting     * Baljinder Gastelum MD - Resident - Assisting     * Kenyon Tellez MD - Primary        Pre-op Diagnosis:  GI bleed [K92.2]  LVAD (left ventricular assist device) present [Z95.811]    Post-op Diagnosis:  Post-Op Diagnosis Codes:     * GI bleed [K92.2]     * LVAD (left ventricular assist device) present [Z95.811]    Procedure(s) (LRB):  EXPLORATORY-LAPAROTOMY with small bowel resection  (N/A)    Anesthesia: General    Description of Procedure: Small bowel resection    Description of the findings of the procedure: Small bowel opened on the back table, ulcerated, strictured.     Estimated Blood Loss: * No values recorded between 3/13/2018  1:04 PM and 3/13/2018  1:41 PM *         Specimens:   Specimen (12h ago through future)    Start     Ordered    03/13/18 1332  Specimen to Pathology - Surgery  Once     Comments:  #1 small bowel with ulceration, stricture and tattoo      03/13/18 1332

## 2018-03-13 NOTE — H&P
[facility]  Critical Care - Surgery  Progress Note    Patient Name: Suman Hayden  MRN: [id number]  Admission Date: 3/5/2018  Hospital Length of Stay: 8 days  Code Status: Full Code  Attending Provider: Ortega Leos MD  Primary Care Provider: Joe Ernst MD   Principal Problem: Anemia    Subjective:     Hospital/ICU Course: 68 y/o M with NICMM, HMIII as DT implanted 7/27/17. Post-op course complicated by Gi bleeding had double balloon enteropscopy in 12/2017 (bleeding ulcer)  and 1/25/2018 no bleeding but ulcer found.Then this past week he had is labs checked and was found to be anemic again. He was admitted to the hospital and did pass some melanic stool. He had a repeat upper endoscopy where several previously known about polyps were found but none actively bleeding, pt underwent ex lap with Small bowel resecetion 3/13/17    Interval History/Significant Events:     Follow-up For: Procedure(s) (LRB):  EXPLORATORY-LAPAROTOMY with small bowel resection  (N/A)  RESECTION-BOWEL    Post-Operative Day: 0 Day of Surgery    Past Medical History:   Diagnosis Date    Arthritis     Cardiomyopathy     CHF (congestive heart failure)     Coronary artery disease     Encounter for blood transfusion     Gastric polyp     Hyperlipidemia     Hypertension     Hypotension, iatrogenic     Obesity     S/P implantation of automatic cardioverter/defibrillator (AICD)     Ventricular tachycardia        Past Surgical History:   Procedure Laterality Date    ABDOMINAL SURGERY      CARDIAC CATHETERIZATION      CARDIAC DEFIBRILLATOR PLACEMENT      CARDIAC DEFIBRILLATOR PLACEMENT      COLONOSCOPY      COLONOSCOPY N/A 3/9/2018    Procedure: COLONOSCOPY;  Surgeon: Andres Chatterjee MD;  [facility] ([address] [place] [address]);  Service: Endoscopy;  Laterality: N/A;    ESOPHAGOGASTRODUODENOSCOPY      LEFT VENTRICULAR ASSIST DEVICE  07/27/2017       Family History   Problem Relation Age of Onset    Heart disease  Mother     Heart disease Father        Social History     Social History    Marital status:      Spouse name: N/A    Number of children: N/A    Years of education: N/A     Social History Main Topics    Smoking status: Never Smoker    Smokeless tobacco: Never Used    Alcohol use No    Drug use: No    Sexual activity: Not Asked     Other Topics Concern    None     Social History Narrative    None       Current Facility-Administered Medications   Medication Dose Route Frequency Provider Last Rate Last Dose    0.9%  NaCl infusion (for blood administration)   Intravenous Q24H PRN Gabbi Barajas MD        0.9%  NaCl infusion (for blood administration)   Intravenous Q24H PRN Gabbi Barajas MD        0.9%  NaCl infusion (for blood administration)   Intravenous Q24H PRN Baljinder Negrete MD        0.9%  NaCl infusion (for blood administration)   Intravenous Q24H PRN Loreta Dunbar NP        0.9%  NaCl infusion   Intravenous Continuous Baljinder Gastelum  mL/hr at 03/13/18 1429      acetaminophen tablet 650 mg  650 mg Oral Q6H PRN John Friedman MD   650 mg at 03/12/18 2156    amLODIPine tablet 5 mg  5 mg Oral Daily Baljinder Negrete MD   5 mg at 03/13/18 0917    ceFAZolin injection 2 g  2 g Intravenous Q8H Ortega Leos MD        dextrose 50% injection 12.5 g  12.5 g Intravenous PRN Roladn Worley MD        dronabinol capsule 5 mg  5 mg Oral TID AC Baljinder Negrete MD   5 mg at 03/13/18 1105    fentaNYL injection 50 mcg  50 mcg Intravenous Q2H PRN Roland Worley MD   50 mcg at 03/13/18 1439    furosemide tablet 20 mg  20 mg Oral BID Baljinder Negrete MD   20 mg at 03/13/18 0917    glucagon (human recombinant) injection 1 mg  1 mg Intramuscular PRN Roland Worley MD        heparin 25,000 units in dextrose 5% 250 mL (100 units/mL) infusion (heparin infusion)  17 Units/kg/hr Intravenous Continuous Gabbi Barajas MD   Stopped at 03/13/18  0706    hydrALAZINE injection 10 mg  10 mg Intravenous Q6H PRN Roland Worley MD   10 mg at 03/13/18 1438    hydrocodone-acetaminophen 5-325mg per tablet 1 tablet  1 tablet Oral Q4H PRN Baljinder Gastelum MD        lisinopril tablet 5 mg  5 mg Oral Daily Baljinder Negrete MD   5 mg at 03/13/18 0917    magnesium oxide tablet 400 mg  400 mg Oral BID Baljinder Negrete MD   400 mg at 03/13/18 0917    ondansetron 4 mg/2 mL injection             ondansetron disintegrating tablet 4 mg  4 mg Oral Q6H PRN Baljinder Negrete MD        ondansetron injection 4 mg  4 mg Intravenous Q6H PRN Roland Worley MD        pantoprazole injection 40 mg  40 mg Intravenous BID Gabbi Barajas MD   40 mg at 03/13/18 0917    polyethylene glycol packet 17 g  17 g Oral BID PRN Baljinder Negrete MD   17 g at 03/11/18 1054    potassium chloride CR capsule 20 mEq  20 mEq Oral Daily Baljinder Negrete MD   20 mEq at 03/13/18 0917    pravastatin tablet 20 mg  20 mg Oral QHS Baljinder Negrete MD   20 mg at 03/12/18 2115    sodium chloride 0.9% flush 10 mL  10 mL Intravenous Q6H Deejay Duff Jr., MD   10 mL at 03/13/18 1106    And    sodium chloride 0.9% flush 10 mL  10 mL Intravenous PRN Deejay Duff Jr., MD        sodium chloride 0.9% flush 3 mL  3 mL Intravenous PRN Demetrio Hyde MD           Review of patient's allergies indicates:   Allergen Reactions    Pneumococcal 23-killian ps vaccine          Objective:     Vital Signs (Most Recent):  Temp: 98.6 °F (37 °C) (03/13/18 1227)  Pulse: 80 (03/13/18 1541)  Resp: 10 (03/13/18 1541)  BP: 119/77 (03/13/18 1445)  SpO2: 100 % (03/13/18 1541) Vital Signs (24h Range):  Temp:  [97.8 °F (36.6 °C)-98.8 °F (37.1 °C)] 98.6 °F (37 °C)  Pulse:  [] 80  Resp:  [10-28] 10  SpO2:  [95 %-100 %] 100 %  BP: ()/(0-77) 119/77  Arterial Line BP: ()/(75-88) 133/79     Weight: 74.8 kg (165 lb)  Body mass index is 24.37 kg/m².      Intake/Output Summary (Last  24 hours) at 03/13/18 1557  Last data filed at 03/13/18 1404   Gross per 24 hour   Intake             2110 ml   Output              900 ml   Net             1210 ml       Physical Exam   Constitutional: He is oriented to person, place, and time. He appears well-developed and well-nourished.   Cardiovascular: Normal rate.    Pulmonary/Chest: Effort normal and breath sounds normal. He has no wheezes.   Abdominal: Soft. Bowel sounds are normal. He exhibits no distension.   Dressing d/c/i   Musculoskeletal: He exhibits no edema.   Neurological: He is alert and oriented to person, place, and time.   Skin: Skin is warm.   Psychiatric: He has a normal mood and affect.       Vents:  Oxygen Concentration (%): 28 (03/13/18 1541)    Lines/Drains/Airways     Peripherally Inserted Central Catheter Line                 PICC Double Lumen 03/06/18 0950 right brachial 7 days          Arterial Line                 Arterial Line 03/13/18 1300 Left Radial less than 1 day          Line                 VAD 07/27/17 1312 Left ventricular assist device HeartMate 3 229 days          Peripheral Intravenous Line                 Peripheral IV - Single Lumen 03/13/18 1250 Right Forearm less than 1 day                Significant Labs:    CBC/Anemia Profile:    Recent Labs  Lab 03/12/18  2129 03/13/18  0402 03/13/18  1424   WBC 5.94 4.89 9.09   HGB 7.0* 6.6* 9.4*   HCT 22.5* 20.6* 29.4*    137* 126*   MCV 84 84 85   RDW 17.0* 17.2* 16.5*        Chemistries:    Recent Labs  Lab 03/12/18  0307 03/13/18  0402 03/13/18  1426    135* 133*   K 4.1 4.2 4.0    103 103   CO2 27 25 23   BUN 22 26* 25*   CREATININE 0.9 1.0 1.1   CALCIUM 9.2 9.4 9.2   ALBUMIN 3.0*  --  3.3*   PROT 5.7*  --  6.2   BILITOT 0.5  --  0.9   ALKPHOS 58  --  58   ALT 9*  --  13   AST 14  --  20   MG 2.0 2.1 2.2   PHOS 3.6 4.0 4.4       Significant Imaging:  I have reviewed all pertinent imaging results/findings within the past 24 hours.    Assessment/Plan:      Active Diagnoses:    Diagnosis Date Noted POA    PRINCIPAL PROBLEM:  Anemia [D64.9] 12/08/2017 Yes    Acute blood loss anemia [D62] 03/11/2018 Yes    Melena [K92.1] 01/25/2018 Yes    GI bleed [K92.2] 01/23/2018 Yes    Ileum ulcer [K63.3] 12/27/2017 Yes    Anticoagulation monitoring, INR range 2-3 [Z79.01] 09/22/2017 Not Applicable    AICD (automatic cardioverter/defibrillator) present [Z95.810] 08/06/2017 Yes    LVAD (left ventricular assist device) present [Z95.811] 07/29/2017 Not Applicable    Hepatitis B core antibody positive since 2012 [R76.8] 07/07/2017 Yes    Chronic combined systolic and diastolic congestive heart failure [I50.42] 07/07/2017 Yes    Nonischemic cardiomyopathy [I42.8] 06/25/2017 Yes      Problems Resolved During this Admission:    Diagnosis Date Noted Date Resolved POA    Heart transplant candidate [Z76.82]  03/12/2018 Not Applicable      68 y/o male with htn, chf, LVAD, s/p ex. Lap and small bowel resection.     Neuro:   -Pain control oxycodone prn, fentanyl prn  -Sedation- none     Pulmonary:   - extubated post op, on NC.  -ABG   -IS     Cardiac:  -80 > MAP goal > 65  -Hemodynamically stable, currently not on pressors  -restart hep. Gtt at 6pm  -continue home bp meds     Renal:   -Monitor UOP   -BUN/Cr wnl     Fluids/Electrolytes/Nutrition/GI:   -NPO except meds, MIVFs  -replace lytes PRN  -GI ulcer prophylaxis: pantoprazole     Hematology/Oncology:  -Monitor H/H, s/p 1 u prbcs post op  -INR  -Anticoagulation  -DVT prophylaxis: Heparin gtt     Infectious Disease:   -Afebrile  -WBC wnl  -Post op abx ancef 2g iv q8     Endocrine:  -SSI  -acuuchecks q6 while npo       Dispo:  -Primary team gen sx  -Continue care in the ICU setting    Critical care was time spent personally by me on the following activities: development of treatment plan with patient or surrogate and bedside caregivers, discussions with consultants, evaluation of patient's response to treatment, examination of  patient, ordering and performing treatments and interventions, ordering and review of laboratory studies, ordering and review of radiographic studies, pulse oximetry, re-evaluation of patient's condition.  This critical care time did not overlap with that of any other provider or involve time for any procedures.     Roland Worley MD  Critical Care - Surgery  [facility]

## 2018-03-13 NOTE — PLAN OF CARE
Problem: Spiritual Distress, Risk/Actual (Adult,Obstetrics,Pediatric)  Intervention: Facilitate Personal Exploration/Expression of Spirituality  Provided initial visit. Pt and family present. Family on phone.  briefly introduced pastoral care and made family aware of 's presence as needed. No spiritual needs expressed at this time. Family expressed good family and prayer support. Family made aware of 's presence as needed.

## 2018-03-13 NOTE — ANESTHESIA PROCEDURE NOTES
Arterial    Diagnosis: LVAD    Patient location during procedure: done in OR  Procedure start time: 3/13/2018 1:00 PM  Timeout: 3/13/2018 1:00 PM  Procedure end time: 3/13/2018 1:00 PM  Staffing  Anesthesiologist: PEDRO RAMOS  Resident/CRNA: CED CAIN  Performed: resident/CRNA   Anesthesiologist was present at the time of the procedure.  Preanesthetic Checklist  Completed: patient identified, site marked, surgical consent, pre-op evaluation, timeout performed, IV checked, risks and benefits discussed, monitors and equipment checked and anesthesia consent givenArterial  Skin Prep: chlorhexidine gluconate  Local Infiltration: lidocaine  Orientation: left  Location: radial  Catheter Size: 20 G  Catheter placement by Ultrasound guidance. Heme positive aspiration all ports.  Vessel Caliber: medium, patent, compressibility normal  Vascular Doppler:  not done  Needle advanced into vessel with real time Ultrasound guidance.Insertion Attempts: 1  Assessment  Dressing: secured with tape and tegaderm

## 2018-03-13 NOTE — PROGRESS NOTES
Patient awake and lethargic with wife at bedside. Dressing was changed per ICU RN. New Singer pepe was placed. Noted there is a kink at DL before it reaches the modular cable. Wife says its been there.  All question answered with verbalization of understanding. Will continue to monitor patients education status.

## 2018-03-13 NOTE — ASSESSMENT & PLAN NOTE
- Known NSAID-induced ileal ulcer  - Appreciate Gen Surgery's help. To have exp lap and small bowel resection today

## 2018-03-13 NOTE — ASSESSMENT & PLAN NOTE
Presenting with chronic GI bleed, has had capsule study and push enteroscopy which reveals a small bowel ulcer, just proximal to a small bowel stricture that is unable to be traversed.     To OR for ex-lap with SBR  Hold heparin gtt on call to OR

## 2018-03-13 NOTE — PROGRESS NOTES
Update:     JITENDRA and supervisor Francis to pt room. Pt and pt's wife in room. Pt presents aaox4 with pleasant affect. Pt states understanding of doctor's plans. Pt reports coping well and denies further needs, questions, concerns at this time and none indicated. Providing ongoing psychosocial and counseling support, education, resources, assistance and discharge planning as indicated. Following and available.

## 2018-03-13 NOTE — PROGRESS NOTES
[facility]  Heart Transplant  Progress Note    Patient Name: Suman Hayden  MRN: [id number]  Admission Date: 3/5/2018  Hospital Length of Stay: 8 days  Attending Physician: Ortega Leos MD  Primary Care Provider: Joe Ernst MD  Principal Problem:Anemia    Subjective:     Interval History: Hgb down to 6.6 this morning, but feels well and denies melena    Continuous Infusions:   heparin (porcine) in 5 % dex Stopped (03/13/18 0706)     Scheduled Meds:   amLODIPine  5 mg Oral Daily    dronabinol  5 mg Oral TID AC    furosemide  20 mg Oral BID    lisinopril  5 mg Oral Daily    magnesium oxide  400 mg Oral BID    pantoprazole  40 mg Intravenous BID    potassium chloride  20 mEq Oral Daily    pravastatin  20 mg Oral QHS    sodium chloride 0.9%  10 mL Intravenous Q6H     PRN Meds:sodium chloride, sodium chloride, sodium chloride, sodium chloride, acetaminophen, ondansetron, polyethylene glycol, Flushing PICC Protocol **AND** sodium chloride 0.9% **AND** sodium chloride 0.9%, sodium chloride 0.9%    Review of patient's allergies indicates:   Allergen Reactions    Pneumococcal 23-killian ps vaccine      Objective:     Vital Signs (Most Recent):  Temp: 98.6 °F (37 °C) (03/13/18 1227)  Pulse: 80 (03/13/18 1227)  Resp: 18 (03/13/18 1227)  BP: (!) 78/0 (03/13/18 1231)  SpO2: 100 % (03/13/18 1227) Vital Signs (24h Range):  Temp:  [97.8 °F (36.6 °C)-98.8 °F (37.1 °C)] 98.6 °F (37 °C)  Pulse:  [] 80  Resp:  [16-18] 18  SpO2:  [95 %-100 %] 100 %  BP: ()/(0-65) 78/0     Patient Vitals for the past 72 hrs (Last 3 readings):   Weight   03/13/18 1227 74.8 kg (165 lb)   03/13/18 0800 70.8 kg (156 lb 1.4 oz)   03/12/18 0800 70 kg (154 lb 5.2 oz)     Body mass index is 24.37 kg/m².      Intake/Output Summary (Last 24 hours) at 03/13/18 1254  Last data filed at 03/13/18 0600   Gross per 24 hour   Intake              900 ml   Output              900 ml   Net                0 ml        Hemodynamic Parameters:           Physical Exam   Constitutional: He is oriented to person, place, and time. He appears well-developed and well-nourished.   HENT:   Head: Normocephalic and atraumatic.   Eyes: Conjunctivae and EOM are normal. Pupils are equal, round, and reactive to light.   Neck: Normal range of motion. Neck supple. No JVD present. No thyromegaly present.   Cardiovascular:   RRR with frequent ectopi. Smooth VAD hum, intermittently pulsatile   Pulmonary/Chest: Effort normal and breath sounds normal.   Abdominal: Soft. Bowel sounds are normal.   Musculoskeletal: Normal range of motion. He exhibits no edema.   Neurological: He is alert and oriented to person, place, and time.   Skin: Skin is warm and dry. Capillary refill takes 2 to 3 seconds.   Psychiatric: He has a normal mood and affect. His behavior is normal. Judgment and thought content normal.       Significant Labs:  CBC:    Recent Labs  Lab 03/12/18  1410 03/12/18  2129 03/13/18  0402   WBC 5.20 5.94 4.89   RBC 2.79* 2.68* 2.45*   HGB 7.5* 7.0* 6.6*   HCT 23.6* 22.5* 20.6*    158 137*   MCV 85 84 84   MCH 26.9* 26.1* 26.9*   MCHC 31.8* 31.1* 32.0     BNP:    Recent Labs  Lab 03/07/18  0349 03/09/18  0354 03/12/18  0307   * 303* 185*     CMP:    Recent Labs  Lab 03/09/18  0101 03/09/18  0354  03/11/18  0344 03/12/18  0307 03/13/18  0402   * 80  < > 89 87 96   CALCIUM 9.5 9.3  < > 9.3 9.2 9.4   ALBUMIN 3.6 3.2*  --   --  3.0*  --    PROT 6.9 6.3  --   --  5.7*  --    * 136  < > 138 137 135*   K 3.4* 3.9  < > 3.9 4.1 4.2   CO2 23 26  < > 27 27 25    102  < > 104 103 103   BUN 15 15  < > 15 22 26*   CREATININE 1.2 1.0  < > 1.0 0.9 1.0   ALKPHOS 67 59  --   --  58  --    ALT 13 13  --   --  9*  --    AST 20 19  --   --  14  --    BILITOT 1.4* 1.0  --   --  0.5  --    < > = values in this interval not displayed.   Coagulation:     Recent Labs  Lab 03/11/18  0344 03/12/18  0307 03/13/18  0402   INR 1.1 1.1 1.1    APTT 69.3* 60.2* 70.6*     LDH:    Recent Labs  Lab 03/11/18  0344 03/12/18  0307 03/13/18  0402    133 168     Microbiology:  Microbiology Results (last 7 days)     ** No results found for the last 168 hours. **          I have reviewed all pertinent labs within the past 24 hours.    Estimated Creatinine Clearance: 71.7 mL/min (based on SCr of 1 mg/dL).    Diagnostic Results:  I have reviewed all pertinent imaging results/findings within the past 24 hours.    Assessment and Plan:     No notes on file    * Anemia    - Acute and related to GI bleed. Unclear location at the moment but thought to be from Ileal ulcer  - HB on admission 5.9. S/p 2 PRBC. Has trended down again at 6.6 - giving 2 more units this morning  - GI requested gen surgery to evaluate the proximal ileal ulcer and advise on possible resection (see below)  - Dr Duff discussed with Dr. Downing and plan is to proceed with surgery.   - Continue oral iron replacement  - continue PPI BID push and monitor H/H closely  - continue heparin gtt for LVAD          GI bleed    - UGI done 3/6/18 showed no obvious source of bleeding. Multiple gastric polyps were resected and retrieved. Path showed no malignancy; helicobacter -ve  - C-scope 3/9/18 without obvious source of bleeding  - Hgb has trended down again. Giving another 2 units PC's (for a total so far this admit of 4 units)        Ileum ulcer    - Known NSAID-induced ileal ulcer  - Appreciate Gen Surgery's help. To have exp lap and small bowel resection today        LVAD (left ventricular assist device) present    - HM III placed on 7/2017 as DT  - Speed 5200  - Last TTE done 11/21/17: LVEDD 6.2, LVEF 10 - 15%, diastolic dysfunction, RV 4.9 and mild - mod depressed, PAS 41, IVC 15, ALONDRA q beat, septum midline  - RHC done 3/7/18 showed low filling pressures, moderate pulmonary HTN and normal CO/CI  - INR today 1.1 today. Coumadin remains on hold  - Continue heparin bridge  - Pulsatile; MAP's  well-cotrolled  - VAD interrogation notable for PI events and occasional speed drops to 4800            Hepatitis B core antibody positive since 2012    - Appreciate Hepatology's help.   - Hep B viral DNA 3/8/18 < 10              [place] [id number]  Heart Transplant  [facility]

## 2018-03-13 NOTE — ASSESSMENT & PLAN NOTE
- UGI done 3/6/18 showed no obvious source of bleeding. Multiple gastric polyps were resected and retrieved. Path showed no malignancy; helicobacter -ve  - C-scope 3/9/18 without obvious source of bleeding  - Hgb has trended down again. Giving another 2 units PC's (for a total so far this admit of 4 units)

## 2018-03-13 NOTE — PLAN OF CARE
Problem: Patient Care Overview  Goal: Plan of Care Review  Outcome: Ongoing (interventions implemented as appropriate)  No significant events overnight. Pt remained free of falls throughout the shift.. Pt NPO over midnight To have exp lap and small bowel resection tomorrow . Pt will stay on heparin and will not restart coumadin until after surgery. Pt complained of some neck pain PRN pain meds given.  Pt POC reviewed with pt and spouse, neither have no questions at this time.

## 2018-03-14 PROBLEM — K92.2 GI BLEED: Status: ACTIVE | Noted: 2018-01-01

## 2018-03-14 PROBLEM — K92.2 GI BLEED: Status: RESOLVED | Noted: 2018-01-01 | Resolved: 2018-01-01

## 2018-03-14 NOTE — PLAN OF CARE
Hepatology Note    Chart reviewed.  HBV DNA repeat is NEGATIVE  HBV S Ab quant is 81.      Pt has evidence of immunity to HBV and no evidence of any viremia.    Given above, pt does NOT require treatment with lamivudine if he were to undergo transplantation.  He should follow up with hepatology clinic if he were to undergo transplant.    Discussed with Dr. Machado  Please call with any additional concerns /questions    Travon Nielson MD  Gastroenterology / Hepatology Fellow (PGY-V)  Pager: [phone]

## 2018-03-14 NOTE — PROGRESS NOTES
[facility]  Critical Care - Surgery  Progress Note    Patient Name: Suman Hayden  MRN: [id number]  Admission Date: 3/5/2018  Hospital Length of Stay: 9 days  Code Status: Full Code  Attending Provider: Ortega Leos MD  Primary Care Provider: Joe Ernst MD   Principal Problem: Anemia    Subjective:     Hospital/ICU Course:  No notes on file    Interval History/Significant Events: POD#1 ex-lap, SBR. No acute events overnight. Afebrile, vital signs stable. On heparin gtt, H&H stable    Follow-up For: Procedure(s) (LRB):  EXPLORATORY-LAPAROTOMY with small bowel resection  (N/A)  RESECTION-BOWEL    Post-Operative Day: 1 Day Post-Op    Objective:     Vital Signs (Most Recent):  Temp: 98.3 °F (36.8 °C) (03/14/18 0700)  Pulse: 89 (03/14/18 0700)  Resp: 18 (03/14/18 0700)  BP: 98/72 (03/14/18 0700)  SpO2: 99 % (03/14/18 0700) Vital Signs (24h Range):  Temp:  [97.8 °F (36.6 °C)-99.6 °F (37.6 °C)] 98.3 °F (36.8 °C)  Pulse:  [] 89  Resp:  [10-28] 18  SpO2:  [95 %-100 %] 99 %  BP: ()/(0-81) 98/72  Arterial Line BP: ()/(52-88) 97/58     Weight: 75.7 kg (166 lb 14.2 oz)  Body mass index is 24.65 kg/m².      Intake/Output Summary (Last 24 hours) at 03/14/18 0932  Last data filed at 03/14/18 0500   Gross per 24 hour   Intake           3254.5 ml   Output             1200 ml   Net           2054.5 ml       Physical Exam   Constitutional: He is oriented to person, place, and time. He appears well-developed and well-nourished.   HENT:   Head: Normocephalic and atraumatic.   Eyes: Conjunctivae and EOM are normal. Pupils are equal, round, and reactive to light.   Neck: Normal range of motion. Neck supple. No JVD present. No thyromegaly present.   Cardiovascular:   RRR with frequent ectopi. Smooth VAD hum, intermittently pulsatile   Pulmonary/Chest: Effort normal and breath sounds normal.   Abdominal: Soft. Bowel sounds are normal.   Midline incision with dressing c/d/i   Musculoskeletal:  Normal range of motion. He exhibits no edema.   Neurological: He is alert and oriented to person, place, and time.   Skin: Skin is warm and dry. Capillary refill takes 2 to 3 seconds.   Psychiatric: He has a normal mood and affect. His behavior is normal. Judgment and thought content normal.       Lines/Drains/Airways     Peripherally Inserted Central Catheter Line                 PICC Double Lumen 03/06/18 0950 right brachial 7 days          Arterial Line                 Arterial Line 03/13/18 1300 Left Radial less than 1 day          Line                 VAD 07/27/17 1312 Left ventricular assist device HeartMate 3 229 days                Significant Labs:    CBC/Anemia Profile:    Recent Labs  Lab 03/13/18  1424 03/13/18  2050 03/14/18  0330   WBC 9.09 12.17 10.73   HGB 9.4* 9.4* 8.9*   HCT 29.4* 28.4* 26.9*   * 145* 148*   MCV 85 83 82   RDW 16.5* 16.4* 16.5*        Chemistries:    Recent Labs  Lab 03/13/18  1426 03/13/18 2105 03/14/18  0330   * 136 138   K 4.0 3.7 4.2    104 106   CO2 23 26 24   BUN 25* 23 19   CREATININE 1.1 1.1 0.9   CALCIUM 9.2 9.3 9.1   ALBUMIN 3.3*  --  3.1*   PROT 6.2  --  6.2   BILITOT 0.9  --  1.3*   ALKPHOS 58  --  55   ALT 13  --  12   AST 20  --  20   MG 2.2 2.0 1.9   PHOS 4.4 3.6 3.1       Recent Lab Results       03/14/18  0620 03/14/18  0616 03/14/18  0330 03/14/18  0020 03/14/18  0017      Immature Granulocytes   0.4       Immature Grans (Abs)   0.04  Comment:  Mild elevation in immature granulocytes is non specific and   can be seen in a variety of conditions including stress response,   acute inflammation, trauma and pregnancy. Correlation with other   laboratory and clinical findings is essential.         Albumin   3.1(L)       Alkaline Phosphatase   55       Allens Test          ALT   12       Anion Gap   8       aPTT  26.6  Comment:  aPTT therapeutic range = 39-69 seconds 26.9  Comment:  aPTT therapeutic range = 39-69 seconds  26.7  Comment:  aPTT  therapeutic range = 39-69 seconds     AST   20       Baso #   0.02       Basophil%   0.2       Bilirubin, Direct   0.6(H)       Total Bilirubin   1.3  Comment:  For infants and newborns, interpretation of results should be based  on gestational age, weight and in agreement with clinical  observations.  Premature Infant recommended reference ranges:  Up to 24 hours.............<8.0 mg/dL  Up to 48 hours............<12.0 mg/dL  3-5 days..................<15.0 mg/dL  6-29 days.................<15.0 mg/dL  (H)       BNP   1,077  Comment:  Values of less than 100 pg/ml are consistent with non-CHF populations.(H)       Site          BUN, Bld   19       Calcium   9.1       Chloride   106       CO2   24       Creatinine   0.9       CRP   25.1(H)       Trendyol          Differential Method   Automated       eGFR if    >60.0       eGFR if non    >60.0  Comment:  Calculation used to obtain the estimated glomerular filtration  rate (eGFR) is the CKD-EPI equation.          Eos #   0.0       Eosinophil%   0.2       FiO2          Flow          Glucose   123(H)       Gran # (ANC)   9.5(H)       Gran%   88.7(H)       Hematocrit   26.9(L)       Hemoglobin   8.9(L)       Coumadin Monitoring INR   1.1  Comment:  Coumadin Therapy:  2.0 - 3.0 for INR for all indicators except mechanical heart valves  and antiphospholipid syndromes which should use 2.5 - 3.5.         LD   169  Comment:  Results are increased in hemolyzed samples.       Lymph #   0.6(L)       Lymph%   5.8(L)       Magnesium   1.9       MCH   27.1       MCHC   33.1       MCV   82       Mode          Mono #   0.5       Mono%   4.7       MPV   10.6       nRBC   0       Phosphorus   3.1       Platelets   148(L)       POC BE          POC HCO3          POC PCO2          POC PH          POC PO2          POC SATURATED O2          POC TCO2          POCT Glucose 123(H)   127(H)      Potassium   4.2       Prealbumin   18(L)       Total Protein   6.2        Protime   11.3       RBC   3.28(L)       RDW   16.5(H)       Sample          Sodium   138       Sp02          WBC   10.73                   03/13/18  2105 03/13/18  2050 03/13/18  1809 03/13/18  1805 03/13/18  1426      Immature Granulocytes  0.2        Immature Grans (Abs)  0.03  Comment:  Mild elevation in immature granulocytes is non specific and   can be seen in a variety of conditions including stress response,   acute inflammation, trauma and pregnancy. Correlation with other   laboratory and clinical findings is essential.          Albumin     3.3(L)     Alkaline Phosphatase     58     Allens Test          ALT     13     Anion Gap 6(L)    7(L)     aPTT    24.2  Comment:  aPTT therapeutic range = 39-69 seconds      AST     20     Baso #  0.03        Basophil%  0.2        Bilirubin, Direct          Total Bilirubin     0.9  Comment:  For infants and newborns, interpretation of results should be based  on gestational age, weight and in agreement with clinical  observations.  Premature Infant recommended reference ranges:  Up to 24 hours.............<8.0 mg/dL  Up to 48 hours............<12.0 mg/dL  3-5 days..................<15.0 mg/dL  6-29 days.................<15.0 mg/dL       BNP          Site          BUN, Bld 23    25(H)     Calcium 9.3    9.2     Chloride 104    103     CO2 26    23     Creatinine 1.1    1.1     CRP          DelSys          Differential Method  Automated        eGFR if  >60.0    >60.0     eGFR if non  >60.0  Comment:  Calculation used to obtain the estimated glomerular filtration  rate (eGFR) is the CKD-EPI equation.       >60.0  Comment:  Calculation used to obtain the estimated glomerular filtration  rate (eGFR) is the CKD-EPI equation.        Eos #  0.1        Eosinophil%  0.6        FiO2          Flow          Glucose 132(H)    136(H)     Gran # (ANC)  10.6(H)        Gran%  87.4(H)        Hematocrit  28.4(L)        Hemoglobin  9.4(L)        Coumadin  Monitoring INR          LD          Lymph #  0.9(L)        Lymph%  7.1(L)        Magnesium 2.0    2.2     MCH  27.4        MCHC  33.1        MCV  83        Mode          Mono #  0.6        Mono%  4.5        MPV  10.4        nRBC  0        Phosphorus 3.6    4.4     Platelets  145(L)        POC BE          POC HCO3          POC PCO2          POC PH          POC PO2          POC SATURATED O2          POC TCO2          POCT Glucose   141(H)       Potassium 3.7    4.0     Prealbumin          Total Protein     6.2     Protime          RBC  3.43(L)        RDW  16.4(H)        Sample          Sodium 136    133(L)     Sp02          WBC  12.17                    03/13/18  1424 03/13/18  1424 03/13/18  1421      Immature Granulocytes  0.3      Immature Grans (Abs)  0.03  Comment:  Mild elevation in immature granulocytes is non specific and   can be seen in a variety of conditions including stress response,   acute inflammation, trauma and pregnancy. Correlation with other   laboratory and clinical findings is essential.        Albumin        Alkaline Phosphatase        Allens Test N/A       ALT        Anion Gap        aPTT        AST        Baso #  0.06      Basophil%  0.7      Bilirubin, Direct        Total Bilirubin        BNP        Site Rosalio/UAC       BUN, Bld        Calcium        Chloride        CO2        Creatinine        CRP        DelSys Nasal Can       Differential Method  Automated      eGFR if         eGFR if non         Eos #  0.4      Eosinophil%  4.1      FiO2 32       Flow 3       Glucose        Gran # (ANC)  5.1      Gran%  56.5      Hematocrit  29.4(L)      Hemoglobin  9.4(L)      Coumadin Monitoring INR  1.1  Comment:  Coumadin Therapy:  2.0 - 3.0 for INR for all indicators except mechanical heart valves  and antiphospholipid syndromes which should use 2.5 - 3.5.        LD        Lymph #  2.8      Lymph%  30.5      Magnesium        MCH  27.0      MCHC  32.0      MCV  85       Mode SPONT       Mono #  0.7      Mono%  7.9      MPV  10.3      nRBC  0      Phosphorus        Platelets  126(L)      POC BE 0       POC HCO3 24.3       POC PCO2 35.9       POC PH 7.439       POC PO2 116(H)       POC SATURATED O2 99       POC TCO2 25       POCT Glucose   133(H)     Potassium        Prealbumin        Total Protein        Protime  11.7      RBC  3.48(L)      RDW  16.5(H)      Sample ARTERIAL       Sodium        Sp02 100       WBC  9.09            Significant Imaging:  I have reviewed all pertinent imaging results/findings within the past 24 hours.    Assessment/Plan:     Ileum ulcer    Neuro:   - AAOx3  - PRN pain control      Pulmonary:   - Currently on room air  - Duo-nebs PRN, IS     Cardiac:  - LVAD flow adequate  - No pressor requirements     Renal:   - BUN/Cr normal   - Singer out  - Monitor UOP     Infectious Disease:   - Afebrile, WBC normal  - Trend WBC  - Routine snehal-op abx     Hematology/Oncology:  - Heparin  - Ok to restart Coumadin from general surgery perspective  - Trend CBC, coags     Endocrine:  - POCT glucose     Fluids/Electrolytes/Nutrition/GI:   - Continue NPO; may try sips of clears later or tomorrow  - mIVF  - Trend CMP  - Replace Lytes PRN     Pain Management::   - PRN Tylenol, Norco     Dispo:  Can step down to CTSU             Critical care was time spent personally by me on the following activities: development of treatment plan with patient or surrogate and bedside caregivers, discussions with consultants, evaluation of patient's response to treatment, examination of patient, ordering and performing treatments and interventions, ordering and review of laboratory studies, ordering and review of radiographic studies, pulse oximetry, re-evaluation of patient's condition.  This critical care time did not overlap with that of any other provider or involve time for any procedures.     Enrike Hodgson MD  Critical Care - Surgery  [facility]

## 2018-03-14 NOTE — PLAN OF CARE
Problem: Patient Care Overview  Goal: Plan of Care Review  Outcome: Ongoing (interventions implemented as appropriate)  Pt AAOx4, on RA, O2 sats >97%. Tmax 99.6F. Frequent PVCs and 1 run of V tach, VAD numbers unchanged. Electrolytes replaced. Pt unable to void at beginning of shift. Pt in/out cathed, 1100 mL output. Pt voided 25 mL per urinal later in shift. Pt and family updated on plan of care throughout shift. See flow sheet for assessment and details.

## 2018-03-14 NOTE — PROGRESS NOTES
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 Post-op course complicated by Gi bleeding had double balloon enteropscopy in 12/2017 (bleeding ulcer)  and 1/25/2018 no bleeding but ulcer found.  Retrograde DBE on 1/26/18 which was negative. He had done well for about a month with no further episodes of bleeding, and no dark stools. Then this past week he had is labs checked and was found to be anemic again. He was admitted to the hospital and did pass some melanic stool. He had a repeat upper endoscopy where several previously known about polyps were found but none actively bleeding.     Post-Op Info:  Procedure(s) (LRB):  EXPLORATORY-LAPAROTOMY with small bowel resection  (N/A)  RESECTION-BOWEL   1 Day Post-Op     Interval History: No acute events overnight  Pain controlled   Denies bm/flatus  Denies n/v    Medications:  Continuous Infusions:   heparin (porcine) in 5 % dex 400 Units/hr (03/14/18 0600)     Scheduled Meds:   amLODIPine  5 mg Oral Daily    furosemide  20 mg Oral BID    lisinopril  5 mg Oral Daily    magnesium oxide  400 mg Oral BID    pantoprozole (PROTONIX) IV infusion  160 mg/hr Intravenous BID    potassium chloride  20 mEq Oral Daily    pravastatin  20 mg Oral QHS    sodium chloride 0.9%  10 mL Intravenous Q6H    warfarin  1 mg Oral Once     PRN Meds:acetaminophen, baclofen, dextrose 50%, fentaNYL, glucagon (human recombinant), hydrALAZINE, hydrocodone-acetaminophen 5-325mg, ondansetron, ondansetron, polyethylene glycol, Flushing PICC Protocol **AND** sodium chloride 0.9% **AND** sodium chloride 0.9%, sodium chloride 0.9%     Review of patient's allergies indicates:   Allergen Reactions    Pneumococcal 23-killian ps vaccine      Objective:     Vital Signs (Most Recent):  Temp: 97.8 °F (36.6 °C) (03/14/18 1100)  Pulse: 96 (03/14/18 1200)  Resp: (!) 26 (03/14/18 1200)  BP: 108/71  (03/14/18 1200)  SpO2: 98 % (03/14/18 1200) Vital Signs (24h Range):  Temp:  [97.8 °F (36.6 °C)-99.6 °F (37.6 °C)] 97.8 °F (36.6 °C)  Pulse:  [] 96  Resp:  [10-28] 26  SpO2:  [95 %-100 %] 98 %  BP: ()/(56-81) 108/71  Arterial Line BP: ()/(52-88) 107/68     Weight: 75.7 kg (166 lb 14.2 oz)  Body mass index is 24.65 kg/m².    Intake/Output - Last 3 Shifts       03/12 0700 - 03/13 0659 03/13 0700 - 03/14 0659 03/14 0700 - 03/15 0659    P.O. 1860      I.V. (mL/kg)  1961 (25.9)     Blood  1293.5     Total Intake(mL/kg) 1860 (26.6) 3254.5 (43)     Urine (mL/kg/hr) 1175 (0.7) 1200 (0.7) 150 (0.3)    Stool 0 (0)      Total Output 1175 1200 150    Net +685 +2054.5 -150           Stool Occurrence 1 x            Physical Exam   Constitutional: He is oriented to person, place, and time. He appears well-developed and well-nourished. No distress.   HENT:   Head: Normocephalic and atraumatic.   Cardiovascular: Normal rate and regular rhythm.    Pulmonary/Chest: Effort normal. No respiratory distress.   Abdominal: Soft. He exhibits no distension. There is tenderness (appropriate incisional).   Soft, incision with surgical dressing in place c/d/i   Neurological: He is alert and oriented to person, place, and time.   Skin: Skin is warm and dry. No rash noted. He is not diaphoretic. No erythema.   Psychiatric: He has a normal mood and affect.       Significant Labs:  CBC:   Recent Labs  Lab 03/14/18  0330   WBC 10.73   RBC 3.28*   HGB 8.9*   HCT 26.9*   *   MCV 82   MCH 27.1   MCHC 33.1     CMP:   Recent Labs  Lab 03/14/18  0330   *   CALCIUM 9.1   ALBUMIN 3.1*   PROT 6.2      K 4.2   CO2 24      BUN 19   CREATININE 0.9   ALKPHOS 55   ALT 12   AST 20   BILITOT 1.3*       Significant Diagnostics:  None    Assessment/Plan:     Ileum ulcer    S/p ex-lap with SBR on 3/13/18    Stable to step down to CTSU from general surgery standpoint  Recommend advancing diet to clear liquid diet  Continue  anticoagulation per primary team  Continue LVAD management per primary team    Will continue to follow along            Baljinder Uribe MD  General Surgery  [facility]

## 2018-03-14 NOTE — OP NOTE
DATE OF PROCEDURE:  03/13/2018.    PREOPERATIVE DIAGNOSES:  Gastrointestinal hemorrhage with small-bowel stricture   and ulceration.    POSTOPERATIVE DIAGNOSES:  Gastrointestinal hemorrhage with small-bowel stricture   and ulceration.    PROCEDURES PERFORMED:  1.  Exploratory laparotomy.  2.  Small-bowel resection with primary anastomosis.    SURGEON:  Kenyon Tellez M.D.    ASSISTANT:  Neil Alexis M.D. (RES).    ANESTHESIA:  General.    CLINICAL NOTE:  This patient is a 67-year-old male status post LVAD, who has   developed continued gastrointestinal hemorrhage.  He has had enteroscopy, which   has documented an ileal stricture as well as ulceration and bleeding proximal to   the stricture.  This area has been tattooed endoscopically.    PROCEDURE NOTE IN DETAIL:  Following induction of adequate general anesthesia,   the patient's abdomen was prepped and draped in a sterile fashion with   ChloraPrep.  We carefully outlined the position of the LVAD driveline and made a   midline abdominal incision well away from this in the lower abdomen from just   above the umbilicus inferiorly.  We then incised the linea alba and entered the   peritoneal cavity.  There was no adhesive disease.  We mobilized the bowel.    There were no gross abnormalities except the area where there was obvious   stricture in the ileum as well as the tattooing from the previous endoscopy   proximal.  We widely excised this, coming across the bowel proximally and   distally with a FRANCOIS stapler and then took down the mesentery between these   excision points with clamps and 2-0 ligatures.  We removed the specimen, opened   it on the back table and noted the pathology to be contained.  We then performed   a side-to-side functional end-to-end anastomosis of the small bowel, utilizing   a FRANCOIS stapler and a TA stapler over the enterotomy sites.  We oversewed all   staple lines with interrupted 3-0 Vicryl sutures and similarly closed the   mesenteric  defect created by the resection with 3-0 Vicryl.  We then returned   the bowel to the abdomen, irrigated thoroughly, and obtained final hemostasis   with cautery.  We then closed the linea alba with #1 PDS running and the skin   with staples.  Sterile dressings were applied and the procedure was terminated   with the patient tolerating it well.      MCT/ELVER  dd: 03/14/2018 08:25:51 (CDT)  td: 03/14/2018 09:45:25 (CDT)  Doc ID   #[id number]  Job ID #[id number]    CC:

## 2018-03-14 NOTE — ASSESSMENT & PLAN NOTE
- HM III placed on 7/2017 as DT  - Speed 5200  - Last TTE done 11/21/17: LVEDD 6.2, LVEF 10 - 15%, diastolic dysfunction, RV 4.9 and mild, mod depressed, PAS 41, IVC 15, ALONDRA q beat, septum midline  - RHC done 3/7/18 showed low filling pressures, moderate pulmonary HTN and normal CO/CI  - INR today 1.1 today. Will discuss restarting warfarin today.   - Continue heparin bridge  - Pulsatile; MAP's well-cotrolled  - VAD interrogation notable for PI events and occasional speed drops to 4800

## 2018-03-14 NOTE — ASSESSMENT & PLAN NOTE
- Known NSAID-induced ileal ulcer  - Appreciate Gen Surgery's help. S/P exp lap and small bowel resection 3/13.  - UGI done 3/6/18 showed no obvious source of bleeding. Multiple gastric polyps were resected and retrieved. Path showed no malignancy; helicobacter -ve  - C-scope 3/9/18 without obvious source of bleeding

## 2018-03-14 NOTE — PT/OT/SLP EVAL
Physical Therapy Evaluation    Patient Name:  Suman Hayden   MRN:  [id number]  Co-eval with OT  Recommendations:     Discharge Recommendations:  home with home health   Discharge Equipment Recommendations: bath bench   Barriers to discharge: Inaccessible home (1 flight of [place] to enter and B HR)     Assessment:     Suman Hayden is a 67 y.o. male admitted with a medical diagnosis of Anemia.  He presents with the following impairments/functional limitations:  impaired endurance, impaired self care skills, impaired functional mobilty, gait instability, pain. Pt required mod A for bed mobility (2nd to pain), CGA-min A to stand, and CGA to take a few steps. Pt would benefit from HH upon d/c to maximize functional mobility and ensure safety.     Rehab Prognosis:  good; patient would benefit from acute skilled PT services to address these deficits and reach maximum level of function.      Recent Surgery: Procedure(s) (LRB):  EXPLORATORY-LAPAROTOMY with small bowel resection  (N/A)  RESECTION-BOWEL 1 Day Post-Op    Plan:     During this hospitalization, patient to be seen 4 x/week to address the above listed problems via gait training, therapeutic activities, therapeutic exercises, neuromuscular re-education  · Plan of Care Expires:  04/14/18   Plan of Care Reviewed with: patient, spouse    Subjective     Communicated with RN prior to session and wife present in room.  Patient found in bed upon PT entry to room, agreeable to evaluation.      Chief Complaint: pain  Patient comments/goals: to get better and return home   Pain/Comfort:  · Pain Rating 1: 10/10 (abdomen)  · Pain Addressed 1: Reposition, Distraction  · Pain Rating Post-Intervention 1: 10/10    Patients cultural, spiritual, Religion conflicts given the current situation: none reported     Living Environment:  Pt lives with wife, daughter, and granddaughter in apt on the 2nd floor with 1 flight of [place] to enter and B HR.  Prior to admission, patients  level of function was mod indep with ambulation (RW; pt reports ambulating short distances without Rw) and indep with ADL's.  Patient has the following equipment: walker, rolling, bedside commode, wheelchair.  DME owned (not currently used): none.  Upon discharge, patient will have assistance from family.    Objective:     Patient found with: telemetry, pulse ox (continuous), blood pressure cuff, LVAD, PICC line, arterial line     General Precautions: Standard, fall, LVAD   Orthopedic Precautions:N/A   Braces: N/A     Exams:  · Gross Motor Coordination:  WFL; B LE alternating toe taps   · Sensation:    · -       Intact; B LE light touch   · RLE ROM: WFL  · RLE Strength: WFL  · LLE ROM: WFL  · LLE Strength: WFL    Functional Mobility:  · Bed Mobility:     · Scooting: contact guard assistance to EOB; increased time 2nd to pain   · Supine to Sit: moderate assistance with log rolling technique   · Transfers:     · Sit to Stand: x 2 trials   · CGA from bed   · Min A from chair  · Gait: ~3 steps forward and backward with CGA  · Decreased hal, decreased step length   · No LOB; no SOB    AM-PAC 6 CLICK MOBILITY  Total Score:16       Therapeutic Activities and Exercises:  Educated pt on PT role/POC  Educated pt on importance of OOB activity  OT educated pt on functional mobility that decreases pressure on abdomen  Pt verbalized understanding    T/f to chair to increase tolerance to OOB activity    LVAD found and remained on wall power. No alarms sounded     Patient left up in chair with all lines intact, call button in reach and RN notified.    GOALS:    Physical Therapy Goals        Problem: Physical Therapy Goal    Goal Priority Disciplines Outcome Goal Variances Interventions   Physical Therapy Goal     PT/OT, PT Ongoing (interventions implemented as appropriate)     Description:  Goals to be met by: 3/21/2018    Patient will increase functional independence with mobility by performin. Supine to sit with  [place] - not met  2. Sit to supine with [place] - not met  3. Sit to stand transfer with [place] - not met  4. Gait  x 250 feet with [place] - not met  5. Ascend/descend 1 flight of stairs with bilateral Handrails [place] - not met                      History:     Past Medical History:   Diagnosis Date    Arthritis     Cardiomyopathy     CHF (congestive heart failure)     Coronary artery disease     Encounter for blood transfusion     Gastric polyp     Hyperlipidemia     Hypertension     Hypotension, iatrogenic     Obesity     S/P implantation of automatic cardioverter/defibrillator (AICD)     Ventricular tachycardia        Past Surgical History:   Procedure Laterality Date    ABDOMINAL SURGERY      CARDIAC CATHETERIZATION      CARDIAC DEFIBRILLATOR PLACEMENT      CARDIAC DEFIBRILLATOR PLACEMENT      COLONOSCOPY      COLONOSCOPY N/A 3/9/2018    Procedure: COLONOSCOPY;  Surgeon: Andres Chatterjee MD;  Location: [facility] ([address] [place] [address]);  Service: Endoscopy;  Laterality: N/A;    ESOPHAGOGASTRODUODENOSCOPY      LEFT VENTRICULAR ASSIST DEVICE  07/27/2017       Time Tracking:     PT Received On: 03/14/18  PT Start Time: 0801     PT Stop Time: 0824  PT Total Time (min): 23 min     Billable Minutes: Evaluation 10 and Gait Training 8      Phoebe Tapia, PT, DPT  3/14/2018  [phone]

## 2018-03-14 NOTE — PLAN OF CARE
Problem: Physical Therapy Goal  Goal: Physical Therapy Goal  Goals to be met by: 3/21/2018    Patient will increase functional independence with mobility by performin. Supine to sit with [place] - not met  2. Sit to supine with [place] - not met  3. Sit to stand transfer with [place] - not met  4. Gait  x 250 feet with [place] - not met  5. Ascend/descend 1 flight of stairs with bilateral Handrails [place] - not met    Outcome: Ongoing (interventions implemented as appropriate)  Eval completed and goals appropriate.

## 2018-03-14 NOTE — ASSESSMENT & PLAN NOTE
S/p ex-lap with SBR on 3/13/18    Stable to step down to CTSU from general surgery standpoint  Recommend advancing diet to clear liquid diet  Continue anticoagulation per primary team  Continue LVAD management per primary team    Will continue to follow along

## 2018-03-14 NOTE — ASSESSMENT & PLAN NOTE
Neuro:   - AAOx3  - PRN pain control      Pulmonary:   - Currently on room air  - Duo-nebs PRN, IS     Cardiac:  - LVAD flow adequate  - No pressor requirements     Renal:   - BUN/Cr normal   - Singer out  - Monitor UOP     Infectious Disease:   - Afebrile, WBC normal  - Trend WBC  - Routine snehal-op abx     Hematology/Oncology:  - Heparin  - Ok to restart Coumadin from general surgery perspective  - Trend CBC, coags     Endocrine:  - POCT glucose     Fluids/Electrolytes/Nutrition/GI:   - Continue NPO; may try sips of clears later or tomorrow  - mIVF  - Trend CMP  - Replace Lytes PRN     Pain Management::   - PRN Tylenol, Norco     Dispo:  Can step down to CTSU

## 2018-03-14 NOTE — ASSESSMENT & PLAN NOTE
- S/P Small Bowel resection 3/13/18.   - Continue oral iron replacement  - continue PPI BID push and monitor H/H closely  - continue heparin gtt for LVAD

## 2018-03-14 NOTE — SUBJECTIVE & OBJECTIVE
Interval History: No acute events overnight  Pain controlled   Denies bm/flatus  Denies n/v    Medications:  Continuous Infusions:   heparin (porcine) in 5 % dex 400 Units/hr (03/14/18 0600)     Scheduled Meds:   amLODIPine  5 mg Oral Daily    furosemide  20 mg Oral BID    lisinopril  5 mg Oral Daily    magnesium oxide  400 mg Oral BID    pantoprozole (PROTONIX) IV infusion  160 mg/hr Intravenous BID    potassium chloride  20 mEq Oral Daily    pravastatin  20 mg Oral QHS    sodium chloride 0.9%  10 mL Intravenous Q6H    warfarin  1 mg Oral Once     PRN Meds:acetaminophen, baclofen, dextrose 50%, fentaNYL, glucagon (human recombinant), hydrALAZINE, hydrocodone-acetaminophen 5-325mg, ondansetron, ondansetron, polyethylene glycol, Flushing PICC Protocol **AND** sodium chloride 0.9% **AND** sodium chloride 0.9%, sodium chloride 0.9%     Review of patient's allergies indicates:   Allergen Reactions    Pneumococcal 23-killian ps vaccine      Objective:     Vital Signs (Most Recent):  Temp: 97.8 °F (36.6 °C) (03/14/18 1100)  Pulse: 96 (03/14/18 1200)  Resp: (!) 26 (03/14/18 1200)  BP: 108/71 (03/14/18 1200)  SpO2: 98 % (03/14/18 1200) Vital Signs (24h Range):  Temp:  [97.8 °F (36.6 °C)-99.6 °F (37.6 °C)] 97.8 °F (36.6 °C)  Pulse:  [] 96  Resp:  [10-28] 26  SpO2:  [95 %-100 %] 98 %  BP: ()/(56-81) 108/71  Arterial Line BP: ()/(52-88) 107/68     Weight: 75.7 kg (166 lb 14.2 oz)  Body mass index is 24.65 kg/m².    Intake/Output - Last 3 Shifts       03/12 0700 - 03/13 0659 03/13 0700 - 03/14 0659 03/14 0700 - 03/15 0659    P.O. 1860      I.V. (mL/kg)  1961 (25.9)     Blood  1293.5     Total Intake(mL/kg) 1860 (26.6) 3254.5 (43)     Urine (mL/kg/hr) 1175 (0.7) 1200 (0.7) 150 (0.3)    Stool 0 (0)      Total Output 1175 1200 150    Net +685 +2054.5 -150           Stool Occurrence 1 x            Physical Exam   Constitutional: He is oriented to person, place, and time. He appears well-developed and  well-nourished. No distress.   HENT:   Head: Normocephalic and atraumatic.   Cardiovascular: Normal rate and regular rhythm.    Pulmonary/Chest: Effort normal. No respiratory distress.   Abdominal: Soft. He exhibits no distension. There is tenderness (appropriate incisional).   Soft, incision with surgical dressing in place c/d/i   Neurological: He is alert and oriented to person, place, and time.   Skin: Skin is warm and dry. No rash noted. He is not diaphoretic. No erythema.   Psychiatric: He has a normal mood and affect.       Significant Labs:  CBC:   Recent Labs  Lab 03/14/18  0330   WBC 10.73   RBC 3.28*   HGB 8.9*   HCT 26.9*   *   MCV 82   MCH 27.1   MCHC 33.1     CMP:   Recent Labs  Lab 03/14/18  0330   *   CALCIUM 9.1   ALBUMIN 3.1*   PROT 6.2      K 4.2   CO2 24      BUN 19   CREATININE 0.9   ALKPHOS 55   ALT 12   AST 20   BILITOT 1.3*       Significant Diagnostics:  None

## 2018-03-14 NOTE — PLAN OF CARE
Problem: Occupational Therapy Goal  Goal: Occupational Therapy Goal  Goals to be met by: 3/28/2018    Pt will complete LB dressing with independence  Pt will be independent with VAD management.   Pt will complete supine with HOB flat to seated at EOB with supervision in prep for seated grooming   Pt will stand at sink to complete grooming with supervision  Pt will complete toilet transfer with SBA  Pt will engage in functional mobility to simulate household distances with supervision in prep for return to occupations of choice.   Outcome: Ongoing (interventions implemented as appropriate)  OT evaluation completed and POC initiated 3/14/2018. Pt would benefit from HH at d/c to maximize independence with ADLs and functional mobility.

## 2018-03-14 NOTE — PT/OT/SLP EVAL
Occupational Therapy   Co-Evaluation with Physical Therapy    Name: Suman Hayden  MRN: [id number]  Admitting Diagnosis:  Anemia 1 Day Post-Op    Recommendations:     Discharge Recommendations: home with home health  Discharge Equipment Recommendations:  bath bench  Barriers to discharge:  Inaccessible home environment (2nd floor apartment w/o elevator access)    History:     Occupational Profile:  Living Environment: Pt lives in daughter's 2nd floor apartment with 2 flights of stairs and b/l handrails. The building has no elevator access and bathroom has a tub/shower combo. Pt, spouse, daughter, and grandchildren stay in the apartment.   Previous level of function: PTA, pt was independent with functional mobility and ADLs. He was utilizing a RW for functional mobility in the community and sometimes in the home. Wife reports patient was running down the stairs daily and fishing.  Roles and Routines: Pt is a , father, and grandfather.  Equipment Owned:  wheelchair, walker, rolling, bedside commode (pt only utilizes the RW when in the community and has never utilized te BCS or WC)  Assistance upon Discharge: Pt's spouse and family will be able to assist as needed at d/c.     Past Medical History:   Diagnosis Date    Arthritis     Cardiomyopathy     CHF (congestive heart failure)     Coronary artery disease     Encounter for blood transfusion     Gastric polyp     Hyperlipidemia     Hypertension     Hypotension, iatrogenic     Obesity     S/P implantation of automatic cardioverter/defibrillator (AICD)     Ventricular tachycardia        Past Surgical History:   Procedure Laterality Date    ABDOMINAL SURGERY      CARDIAC CATHETERIZATION      CARDIAC DEFIBRILLATOR PLACEMENT      CARDIAC DEFIBRILLATOR PLACEMENT      COLONOSCOPY      COLONOSCOPY N/A 3/9/2018    Procedure: COLONOSCOPY;  Surgeon: Andres Chatterjee MD;  Location: [address];  Service: Endoscopy;  Laterality: N/A;     ESOPHAGOGASTRODUODENOSCOPY      LEFT VENTRICULAR ASSIST DEVICE  07/27/2017       Subjective     Chief Complaint: pain in abdomen   Patient/Family stated goals: return home and to PLOF  Communicated with: RN prior to session. Pt agreeable to therapy evaluation.   Pain/Comfort:  · Pain Rating 1:  (pt reporting 8/10 pain in abdomen at rest)  · Pain Addressed 1: Reposition, Distraction, Nurse notified  · Pain Rating Post-Intervention 1:  (pt not rating pain at end of session)    Patients cultural, spiritual, Alevism conflicts given the current situation: none reported     Objective:     Patient found with: arterial line, PICC line, LVAD (on wall power)    General Precautions: Standard, fall, NPO, LVAD   Orthopedic Precautions:N/A   Braces: N/A     Occupational Performance:    Bed Mobility:    · Patient completed Rolling/Turning to Right with supervision  · Patient completed Scooting/Bridging with stand by assistance anteriorly approx 4 inches towards EOB with increased time   · Patient completed Supine to Sit with moderate assistance  · Pt able to clear BLE's off EOB, however requiring mod A for attaining upright trunk positioning d/t increased pain in abdomen     Functional Mobility/Transfers:  · Patient completed Sit <> Stand Transfer with contact guard assistance and minimum assistance  with  no assistive device   · CGA from EOB with bed in lowest position]  · Min A from bedside chair x1 trial prior to engaging in functional mobility   · Patient completed Bed <> Chair Transfer using Stand Pivot technique with contact guard assistance with no assistive device  · Functional Mobility: Pt engaging in functional mobility with PT taking a few steps forward and backward towards the chair with CGA. Please refer to PT note for further information regarding gait.     Activities of Daily Living:  · Feeding:  pt currently NPO, however pt noted with ability to access face with BUE's    · Grooming: activity did not occur      · Bathing: activity did not occur     · UB Dressing: activity did not occur      · LB Dressing: contact guard assistance (pt seated at EOB with ability to access BLE's to don socks in tailor sitting position with increased time; pt requiring CGA for standing balance at this time)  · Toileting: activity did not occur        Cognitive/Visual Perceptual:  Cognitive/Psychosocial Skills:     -       Oriented to: Person, Place, Time and Situation   -       Follows Commands/attention:Follows two-step commands and Follows multistep  commands  -       Communication: clear/fluent  -       Memory: No Deficits noted  -       Safety awareness/insight to disability: intact   -       Mood/Affect/Coping skills/emotional control: Appropriate to situation  Visual/Perceptual:      -Intact    Physical Exam:  Balance:    -       pt seated EOB x5 minute with SBA and intermittent UE support; CGA  for standing balance   Postural examination/scapula alignment:    -       No postural abnormalities identified  Skin integrity: Visible skin intact  Edema:  None noted  Sensation:    -       Intact  Upper Extremity Range of Motion:     -       Right Upper Extremity: WFL  -       Left Upper Extremity: WFL  Upper Extremity Strength:    -       Right Upper Extremity: WFL  -       Left Upper Extremity: WFL   Strength:    -       Right Upper Extremity: WFL  -       Left Upper Extremity: WFL  Fine Motor Coordination:    -       Intact  Gross motor coordination:   WFL    Patient left up in chair with all lines intact, call button in reach, RN notified and spouse present    AMPA 6 Click:  AMPAC Total Score: 21    Treatment & Education:  -LVAD on wall power throughout session with anchor in place and strap around shoulder for safety during transfer    Additional:  Communicated role of OT/POC  Updated communication board  RN notified post-OT session  Education:    Assessment:     Suman Hayden is a 67 y.o. male with a medical diagnosis of  "Anemia. Pt was implanted with LVAD on 2017 and admitted to [facility] for anemia. Pt underwent exploratory laparotomy with Small bowl resections 3/13/2018. Performance deficits affecting function are weakness, impaired endurance, impaired self care skills, impaired functional mobilty, impaired balance, pain.  Pt with good motivation to engage in therapy session and with good insight into condition. Pt p/w decreased independence with ADLs and functional mobility at this time d/t increased abdominal pain, CGA for t/f's and functional mobility, impaired standing balance, decreased standing tolerance, and decreased functional activity tolerance). Pt would benefit from HH at d/c to maximize independence with ADLs and functional mobility to ensure safe return to high PLOF.     Rehab Prognosis:  good; patient would benefit from acute skilled OT services to address these deficits and reach maximum level of function.         Clinical Decision Makin.  OT Low:  "Pt evaluation falls under low complexity for evaluation coding due to performance deficits noted in 1-3 areas as stated above and 0 co-morbities affecting current functional status. Data obtained from problem focused assessments. No modifications or assistance was required for completion of evaluation. Only brief occupational profile and history review completed."     Plan:     Patient to be seen 4 x/week to address the above listed problems via self-care/home management, therapeutic activities, therapeutic exercises  · Plan of Care Expires: 18  · Plan of Care Reviewed with: patient, spouse    This Plan of care has been discussed with the patient who was involved in its development and understands and is in agreement with the identified goals and treatment plan    GOALS:    Occupational Therapy Goals        Problem: Occupational Therapy Goal    Goal Priority Disciplines Outcome Interventions   Occupational Therapy Goal     OT, PT/OT Ongoing (interventions " implemented as appropriate)    Description:  Goals to be met by: 3/28/2018    Pt will complete LB dressing with independence  Pt will be independent with VAD management.   Pt will complete supine with HOB flat to seated at EOB with supervision in prep for seated grooming   Pt will stand at sink to complete grooming with supervision  Pt will complete toilet transfer with SBA  Pt will engage in functional mobility to simulate household distances with supervision in prep for return to occupations of choice.                     Time Tracking:     OT Date of Treatment: 03/14/18  OT Start Time: 0801  OT Stop Time: 0823  OT Total Time (min): 22 min    Billable Minutes:Evaluation 22 minutes    Daphney Delacruz, OT  3/14/2018

## 2018-03-14 NOTE — SUBJECTIVE & OBJECTIVE
Interval History/Significant Events: POD#1 ex-lap, SBR. No acute events overnight. Afebrile, vital signs stable. On heparin gtt, H&H stable    Follow-up For: Procedure(s) (LRB):  EXPLORATORY-LAPAROTOMY with small bowel resection  (N/A)  RESECTION-BOWEL    Post-Operative Day: 1 Day Post-Op    Objective:     Vital Signs (Most Recent):  Temp: 98.3 °F (36.8 °C) (03/14/18 0700)  Pulse: 89 (03/14/18 0700)  Resp: 18 (03/14/18 0700)  BP: 98/72 (03/14/18 0700)  SpO2: 99 % (03/14/18 0700) Vital Signs (24h Range):  Temp:  [97.8 °F (36.6 °C)-99.6 °F (37.6 °C)] 98.3 °F (36.8 °C)  Pulse:  [] 89  Resp:  [10-28] 18  SpO2:  [95 %-100 %] 99 %  BP: ()/(0-81) 98/72  Arterial Line BP: ()/(52-88) 97/58     Weight: 75.7 kg (166 lb 14.2 oz)  Body mass index is 24.65 kg/m².      Intake/Output Summary (Last 24 hours) at 03/14/18 0932  Last data filed at 03/14/18 0500   Gross per 24 hour   Intake           3254.5 ml   Output             1200 ml   Net           2054.5 ml       Physical Exam   Constitutional: He is oriented to person, place, and time. He appears well-developed and well-nourished.   HENT:   Head: Normocephalic and atraumatic.   Eyes: Conjunctivae and EOM are normal. Pupils are equal, round, and reactive to light.   Neck: Normal range of motion. Neck supple. No JVD present. No thyromegaly present.   Cardiovascular:   RRR with frequent ectopi. Smooth VAD hum, intermittently pulsatile   Pulmonary/Chest: Effort normal and breath sounds normal.   Abdominal: Soft. Bowel sounds are normal.   Midline incision with dressing c/d/i   Musculoskeletal: Normal range of motion. He exhibits no edema.   Neurological: He is alert and oriented to person, place, and time.   Skin: Skin is warm and dry. Capillary refill takes 2 to 3 seconds.   Psychiatric: He has a normal mood and affect. His behavior is normal. Judgment and thought content normal.       Lines/Drains/Airways     Peripherally Inserted Central Catheter Line                  PICC Double Lumen 03/06/18 0950 right brachial 7 days          Arterial Line                 Arterial Line 03/13/18 1300 Left Radial less than 1 day          Line                 VAD 07/27/17 1312 Left ventricular assist device HeartMate 3 229 days                Significant Labs:    CBC/Anemia Profile:    Recent Labs  Lab 03/13/18  1424 03/13/18  2050 03/14/18  0330   WBC 9.09 12.17 10.73   HGB 9.4* 9.4* 8.9*   HCT 29.4* 28.4* 26.9*   * 145* 148*   MCV 85 83 82   RDW 16.5* 16.4* 16.5*        Chemistries:    Recent Labs  Lab 03/13/18  1426 03/13/18  2105 03/14/18  0330   * 136 138   K 4.0 3.7 4.2    104 106   CO2 23 26 24   BUN 25* 23 19   CREATININE 1.1 1.1 0.9   CALCIUM 9.2 9.3 9.1   ALBUMIN 3.3*  --  3.1*   PROT 6.2  --  6.2   BILITOT 0.9  --  1.3*   ALKPHOS 58  --  55   ALT 13  --  12   AST 20  --  20   MG 2.2 2.0 1.9   PHOS 4.4 3.6 3.1       Recent Lab Results       03/14/18  0620 03/14/18  0616 03/14/18  0330 03/14/18  0020 03/14/18  0017      Immature Granulocytes   0.4       Immature Grans (Abs)   0.04  Comment:  Mild elevation in immature granulocytes is non specific and   can be seen in a variety of conditions including stress response,   acute inflammation, trauma and pregnancy. Correlation with other   laboratory and clinical findings is essential.         Albumin   3.1(L)       Alkaline Phosphatase   55       Allens Test          ALT   12       Anion Gap   8       aPTT  26.6  Comment:  aPTT therapeutic range = 39-69 seconds 26.9  Comment:  aPTT therapeutic range = 39-69 seconds  26.7  Comment:  aPTT therapeutic range = 39-69 seconds     AST   20       Baso #   0.02       Basophil%   0.2       Bilirubin, Direct   0.6(H)       Total Bilirubin   1.3  Comment:  For infants and newborns, interpretation of results should be based  on gestational age, weight and in agreement with clinical  observations.  Premature Infant recommended reference ranges:  Up to 24 hours.............<8.0  mg/dL  Up to 48 hours............<12.0 mg/dL  3-5 days..................<15.0 mg/dL  6-29 days.................<15.0 mg/dL  (H)       BNP   1,077  Comment:  Values of less than 100 pg/ml are consistent with non-CHF populations.(H)       Site          BUN, Bld   19       Calcium   9.1       Chloride   106       CO2   24       Creatinine   0.9       CRP   25.1(H)       DelSys          Differential Method   Automated       eGFR if    >60.0       eGFR if non    >60.0  Comment:  Calculation used to obtain the estimated glomerular filtration  rate (eGFR) is the CKD-EPI equation.          Eos #   0.0       Eosinophil%   0.2       FiO2          Flow          Glucose   123(H)       Gran # (ANC)   9.5(H)       Gran%   88.7(H)       Hematocrit   26.9(L)       Hemoglobin   8.9(L)       Coumadin Monitoring INR   1.1  Comment:  Coumadin Therapy:  2.0 - 3.0 for INR for all indicators except mechanical heart valves  and antiphospholipid syndromes which should use 2.5 - 3.5.         LD   169  Comment:  Results are increased in hemolyzed samples.       Lymph #   0.6(L)       Lymph%   5.8(L)       Magnesium   1.9       MCH   27.1       MCHC   33.1       MCV   82       Mode          Mono #   0.5       Mono%   4.7       MPV   10.6       nRBC   0       Phosphorus   3.1       Platelets   148(L)       POC BE          POC HCO3          POC PCO2          POC PH          POC PO2          POC SATURATED O2          POC TCO2          POCT Glucose 123(H)   127(H)      Potassium   4.2       Prealbumin   18(L)       Total Protein   6.2       Protime   11.3       RBC   3.28(L)       RDW   16.5(H)       Sample          Sodium   138       Sp02          WBC   10.73                   03/13/18  2105 03/13/18  2050 03/13/18  1809 03/13/18  1805 03/13/18  1426      Immature Granulocytes  0.2        Immature Grans (Abs)  0.03  Comment:  Mild elevation in immature granulocytes is non specific and   can be seen in a variety of  conditions including stress response,   acute inflammation, trauma and pregnancy. Correlation with other   laboratory and clinical findings is essential.          Albumin     3.3(L)     Alkaline Phosphatase     58     Allens Test          ALT     13     Anion Gap 6(L)    7(L)     aPTT    24.2  Comment:  aPTT therapeutic range = 39-69 seconds      AST     20     Baso #  0.03        Basophil%  0.2        Bilirubin, Direct          Total Bilirubin     0.9  Comment:  For infants and newborns, interpretation of results should be based  on gestational age, weight and in agreement with clinical  observations.  Premature Infant recommended reference ranges:  Up to 24 hours.............<8.0 mg/dL  Up to 48 hours............<12.0 mg/dL  3-5 days..................<15.0 mg/dL  6-29 days.................<15.0 mg/dL       BNP          Site          BUN, Bld 23    25(H)     Calcium 9.3    9.2     Chloride 104    103     CO2 26    23     Creatinine 1.1    1.1     CRP          DelSys          Differential Method  Automated        eGFR if  >60.0    >60.0     eGFR if non  >60.0  Comment:  Calculation used to obtain the estimated glomerular filtration  rate (eGFR) is the CKD-EPI equation.       >60.0  Comment:  Calculation used to obtain the estimated glomerular filtration  rate (eGFR) is the CKD-EPI equation.        Eos #  0.1        Eosinophil%  0.6        FiO2          Flow          Glucose 132(H)    136(H)     Gran # (ANC)  10.6(H)        Gran%  87.4(H)        Hematocrit  28.4(L)        Hemoglobin  9.4(L)        Coumadin Monitoring INR          LD          Lymph #  0.9(L)        Lymph%  7.1(L)        Magnesium 2.0    2.2     MCH  27.4        MCHC  33.1        MCV  83        Mode          Mono #  0.6        Mono%  4.5        MPV  10.4        nRBC  0        Phosphorus 3.6    4.4     Platelets  145(L)        POC BE          POC HCO3          POC PCO2          POC PH          POC PO2          POC  SATURATED O2          POC TCO2          POCT Glucose   141(H)       Potassium 3.7    4.0     Prealbumin          Total Protein     6.2     Protime          RBC  3.43(L)        RDW  16.4(H)        Sample          Sodium 136    133(L)     Sp02          WBC  12.17                    03/13/18  1424 03/13/18  1424 03/13/18  1421      Immature Granulocytes  0.3      Immature Grans (Abs)  0.03  Comment:  Mild elevation in immature granulocytes is non specific and   can be seen in a variety of conditions including stress response,   acute inflammation, trauma and pregnancy. Correlation with other   laboratory and clinical findings is essential.        Albumin        Alkaline Phosphatase        Allens Test N/A       ALT        Anion Gap        aPTT        AST        Baso #  0.06      Basophil%  0.7      Bilirubin, Direct        Total Bilirubin        BNP        Site Rosalio/UAC       BUN, Bld        Calcium        Chloride        CO2        Creatinine        CRP        DelSys Nasal Can       Differential Method  Automated      eGFR if         eGFR if non         Eos #  0.4      Eosinophil%  4.1      FiO2 32       Flow 3       Glucose        Gran # (ANC)  5.1      Gran%  56.5      Hematocrit  29.4(L)      Hemoglobin  9.4(L)      Coumadin Monitoring INR  1.1  Comment:  Coumadin Therapy:  2.0 - 3.0 for INR for all indicators except mechanical heart valves  and antiphospholipid syndromes which should use 2.5 - 3.5.        LD        Lymph #  2.8      Lymph%  30.5      Magnesium        MCH  27.0      MCHC  32.0      MCV  85      Mode SPONT       Mono #  0.7      Mono%  7.9      MPV  10.3      nRBC  0      Phosphorus        Platelets  126(L)      POC BE 0       POC HCO3 24.3       POC PCO2 35.9       POC PH 7.439       POC PO2 116(H)       POC SATURATED O2 99       POC TCO2 25       POCT Glucose   133(H)     Potassium        Prealbumin        Total Protein        Protime  11.7      RBC  3.48(L)       RDW  16.5(H)      Sample ARTERIAL       Sodium        Sp02 100       WBC  9.09            Significant Imaging:  I have reviewed all pertinent imaging results/findings within the past 24 hours.

## 2018-03-14 NOTE — PROGRESS NOTES
[facility]  Heart Transplant  Progress Note    Patient Name: Suman Hayden  MRN: [id number]  Admission Date: 3/5/2018  Hospital Length of Stay: 9 days  Attending Physician: Ortega Leos MD  Primary Care Provider: Joe Ernst MD  Principal Problem:Anemia    Subjective:     Interval History: S/P small bowel resection. Doing OK. Pain but tolerable. No gas/BM since surgery.  AD interrogation completed this AM in the event changes needed to be made. Will continue to monitor for further issues.     Pulsatile: Yes   VAD Sounds: HM3  Smooth  Problems / Issues / Alarms with VAD if any: None noted    VAD Interrogation:  TXP LVAD INTERROGATIONS 3/14/2018 3/14/2018 3/14/2018 3/14/2018 3/14/2018 3/14/2018 3/14/2018   Type HeartMate3 HeartMate3 HeartMate3 HeartMate3 HeartMate3 HeartMate3 HeartMate3   Flow 3.7 3.9 4 4 4 4.4 4.3   Speed 5200 5200 5200 5200 5200 5200 5200   PI 4.7 4.4 3.7 3.8 3.5 3.3 3.4   Power ([place]) 3.6 3.6 3.5 3.6 3.8 3.7 3.6   LSL 4800 4800 4800 - - - 4800   Pulsatility Pulse Pulse Pulse - - - Pulse   }          Continuous Infusions:   heparin (porcine) in 5 % dex 400 Units/hr (03/14/18 0600)     Scheduled Meds:   amLODIPine  5 mg Oral Daily    furosemide  20 mg Oral BID    lisinopril  5 mg Oral Daily    magnesium oxide  400 mg Oral BID    pantoprozole (PROTONIX) IV infusion  160 mg/hr Intravenous BID    potassium chloride  20 mEq Oral Daily    pravastatin  20 mg Oral QHS    sodium chloride 0.9%  10 mL Intravenous Q6H     PRN Meds:acetaminophen, dextrose 50%, fentaNYL, glucagon (human recombinant), hydrALAZINE, hydrocodone-acetaminophen 5-325mg, ondansetron, ondansetron, polyethylene glycol, Flushing PICC Protocol **AND** sodium chloride 0.9% **AND** sodium chloride 0.9%, sodium chloride 0.9%    Review of patient's allergies indicates:   Allergen Reactions    Pneumococcal 23-killian ps vaccine      Objective:     Vital Signs (Most Recent):  Temp: 99 °F (37.2 °C) (03/14/18  0300)  Pulse: 94 (03/14/18 0600)  Resp: (!) 23 (03/14/18 0600)  BP: 105/69 (03/14/18 0600)  SpO2: 98 % (03/14/18 0600) Vital Signs (24h Range):  Temp:  [97.8 °F (36.6 °C)-99.6 °F (37.6 °C)] 99 °F (37.2 °C)  Pulse:  [] 94  Resp:  [10-28] 23  SpO2:  [95 %-100 %] 98 %  BP: ()/(0-81) 105/69  Arterial Line BP: ()/(52-88) 105/66     Patient Vitals for the past 72 hrs (Last 3 readings):   Weight   03/14/18 0544 75.7 kg (166 lb 14.2 oz)   03/13/18 1227 74.8 kg (165 lb)   03/13/18 0800 70.8 kg (156 lb 1.4 oz)     Body mass index is 24.65 kg/m².      Intake/Output Summary (Last 24 hours) at 03/14/18 0806  Last data filed at 03/14/18 0500   Gross per 24 hour   Intake           3254.5 ml   Output             1200 ml   Net           2054.5 ml          Physical Exam   Constitutional: He is oriented to person, place, and time. He appears well-developed and well-nourished.   HENT:   Head: Normocephalic and atraumatic.   Eyes: Conjunctivae and EOM are normal. Pupils are equal, round, and reactive to light.   Neck: Normal range of motion. Neck supple. No JVD present. No thyromegaly present.   Cardiovascular:   RRR with frequent ectopi. Smooth VAD hum, intermittently pulsatile   Pulmonary/Chest: Effort normal and breath sounds normal.   Abdominal: Soft. Bowel sounds are normal.   Musculoskeletal: Normal range of motion. He exhibits no edema.   Neurological: He is alert and oriented to person, place, and time.   Skin: Skin is warm and dry. Capillary refill takes 2 to 3 seconds.   Psychiatric: He has a normal mood and affect. His behavior is normal. Judgment and thought content normal.       Significant Labs:  CBC:    Recent Labs  Lab 03/13/18  1424 03/13/18  2050 03/14/18  0330   WBC 9.09 12.17 10.73   RBC 3.48* 3.43* 3.28*   HGB 9.4* 9.4* 8.9*   HCT 29.4* 28.4* 26.9*   * 145* 148*   MCV 85 83 82   MCH 27.0 27.4 27.1   MCHC 32.0 33.1 33.1     BNP:    Recent Labs  Lab 03/09/18  0354 03/12/18  0307 03/14/18  0330    * 185* 1,077*     CMP:    Recent Labs  Lab 03/12/18  0307  03/13/18  1426 03/13/18  2105 03/14/18  0330   GLU 87  < > 136* 132* 123*   CALCIUM 9.2  < > 9.2 9.3 9.1   ALBUMIN 3.0*  --  3.3*  --  3.1*   PROT 5.7*  --  6.2  --  6.2     < > 133* 136 138   K 4.1  < > 4.0 3.7 4.2   CO2 27  < > 23 26 24     < > 103 104 106   BUN 22  < > 25* 23 19   CREATININE 0.9  < > 1.1 1.1 0.9   ALKPHOS 58  --  58  --  55   ALT 9*  --  13  --  12   AST 14  --  20  --  20   BILITOT 0.5  --  0.9  --  1.3*   < > = values in this interval not displayed.   Coagulation:     Recent Labs  Lab 03/13/18  0402 03/13/18  1424  03/14/18  0017 03/14/18  0330 03/14/18  0616   INR 1.1 1.1  --   --  1.1  --    APTT 70.6*  --   < > 26.7 26.9 26.6   < > = values in this interval not displayed.  LDH:    Recent Labs  Lab 03/12/18  0307 03/13/18  0402 03/14/18  0330    168 169     Microbiology:  Microbiology Results (last 7 days)     ** No results found for the last 168 hours. **          I have reviewed all pertinent labs within the past 24 hours.    Estimated Creatinine Clearance: 79.6 mL/min (based on SCr of 0.9 mg/dL).    Diagnostic Results:  I have reviewed all pertinent imaging results/findings within the past 24 hours.    Assessment and Plan:     No notes on file    * Anemia    - S/P Small Bowel resection 3/13/18.   - Continue oral iron replacement  - continue PPI BID push and monitor H/H closely  - continue heparin gtt for LVAD          LVAD (left ventricular assist device) present    - HM III placed on 7/2017 as DT  - Speed 5200  - Last TTE done 11/21/17: LVEDD 6.2, LVEF 10 - 15%, diastolic dysfunction, RV 4.9 and mild, mod depressed, PAS 41, IVC 15, ALONDRA q beat, septum midline  - RHC done 3/7/18 showed low filling pressures, moderate pulmonary HTN and normal CO/CI  - INR today 1.1 today. Will discuss restarting warfarin today.   - Continue heparin bridge  - Pulsatile; MAP's well-cotrolled  - VAD interrogation notable for  PI events and occasional speed drops to 4800            Ileum ulcer    - Known NSAID-induced ileal ulcer  - Appreciate Gen Surgery's help. S/P exp lap and small bowel resection 3/13.  - UGI done 3/6/18 showed no obvious source of bleeding. Multiple gastric polyps were resected and retrieved. Path showed no malignancy; helicobacter -ve  - C-scope 3/9/18 without obvious source of bleeding        Hepatitis B core antibody positive since 2012    - Appreciate Hepatology's help.   - Hep B viral DNA 3/8/18 < 10              Kishore Paz NP  Heart Transplant  [facility]

## 2018-03-15 NOTE — PLAN OF CARE
Problem: Patient Care Overview  Goal: Plan of Care Review  Outcome: Ongoing (interventions implemented as appropriate)  Patient progressing toward all goals. Plan of care discussed with patient, no questions at this time. Patient ambulating with one person assist, fall precautions in place. VS & LVAD numbers WNL. Abdominal incision draining a small amount of bright red blood; cleaned with saline and placed [place] dressing over incision; Pain controlled with oral pain medication; Heparin infusing; Diet advanced; Will monitor.

## 2018-03-15 NOTE — SUBJECTIVE & OBJECTIVE
Interval History: S/P small bowel resection 3/13. Doing well. No complaints. Pain well controlled. Passing frequent gas. No BM yet.     Continuous Infusions:   heparin (porcine) in 5 % dex 400 Units/hr (03/14/18 1800)     Scheduled Meds:   amLODIPine  5 mg Oral Daily    furosemide  20 mg Oral BID    lisinopril  5 mg Oral Daily    magnesium oxide  400 mg Oral BID    pantoprozole (PROTONIX) IV infusion  160 mg/hr Intravenous BID    potassium chloride  20 mEq Oral Daily    pravastatin  20 mg Oral QHS    sodium chloride 0.9%  10 mL Intravenous Q6H     PRN Meds:acetaminophen, acetaminophen, baclofen, benzonatate, dextrose 50%, glucagon (human recombinant), hydrALAZINE, hydrocodone-acetaminophen 5-325mg, ondansetron, ondansetron, polyethylene glycol, Flushing PICC Protocol **AND** sodium chloride 0.9% **AND** sodium chloride 0.9%, sodium chloride 0.9%    Review of patient's allergies indicates:   Allergen Reactions    Pneumococcal 23-killian ps vaccine      Objective:     Vital Signs (Most Recent):  Temp: 99.2 °F (37.3 °C) (03/15/18 0458)  Pulse: 87 (03/15/18 0716)  Resp: 18 (03/15/18 0458)  BP: (!) 88/0 (03/15/18 0403)  SpO2: 95 % (03/15/18 0458) Vital Signs (24h Range):  Temp:  [97.8 °F (36.6 °C)-99.7 °F (37.6 °C)] 99.2 °F (37.3 °C)  Pulse:  [] 87  Resp:  [14-33] 18  SpO2:  [95 %-100 %] 95 %  BP: ()/(0-76) 88/0  Arterial Line BP: ()/(56-70) 107/68     Patient Vitals for the past 72 hrs (Last 3 readings):   Weight   03/14/18 0544 75.7 kg (166 lb 14.2 oz)   03/13/18 1227 74.8 kg (165 lb)   03/13/18 0800 70.8 kg (156 lb 1.4 oz)     Body mass index is 24.65 kg/m².      Intake/Output Summary (Last 24 hours) at 03/15/18 0721  Last data filed at 03/15/18 0600   Gross per 24 hour   Intake              770 ml   Output             1450 ml   Net             -680 ml        Physical Exam   Constitutional: He is oriented to person, place, and time. He appears well-developed and well-nourished.   HENT:   Head:  Normocephalic and atraumatic.   Eyes: Conjunctivae and EOM are normal. Pupils are equal, round, and reactive to light.   Neck: Normal range of motion. Neck supple. No JVD present. No thyromegaly present.   Cardiovascular:   RRR with frequent ectopi. Smooth VAD hum, intermittently pulsatile   Pulmonary/Chest: Effort normal and breath sounds normal.   Abdominal: Soft. Bowel sounds are normal.   Musculoskeletal: Normal range of motion. He exhibits no edema.   Neurological: He is alert and oriented to person, place, and time.   Skin: Skin is warm and dry. Capillary refill takes 2 to 3 seconds.   Psychiatric: He has a normal mood and affect. His behavior is normal. Judgment and thought content normal.       Significant Labs:  CBC:    Recent Labs  Lab 03/13/18 2050 03/14/18  0330 03/15/18  0554   WBC 12.17 10.73 5.95   RBC 3.43* 3.28* 2.91*   HGB 9.4* 8.9* 7.9*   HCT 28.4* 26.9* 24.9*   * 148* 142*   MCV 83 82 86   MCH 27.4 27.1 27.1   MCHC 33.1 33.1 31.7*     BNP:    Recent Labs  Lab 03/09/18  0354 03/12/18  0307 03/14/18  0330   * 185* 1,077*     CMP:    Recent Labs  Lab 03/12/18  0307  03/13/18  1426 03/13/18  2105 03/14/18  0330 03/15/18  0554   GLU 87  < > 136* 132* 123* 107   CALCIUM 9.2  < > 9.2 9.3 9.1 9.3   ALBUMIN 3.0*  --  3.3*  --  3.1*  --    PROT 5.7*  --  6.2  --  6.2  --      < > 133* 136 138 140   K 4.1  < > 4.0 3.7 4.2 4.2   CO2 27  < > 23 26 24 26     < > 103 104 106 106   BUN 22  < > 25* 23 19 21   CREATININE 0.9  < > 1.1 1.1 0.9 0.8   ALKPHOS 58  --  58  --  55  --    ALT 9*  --  13  --  12  --    AST 14  --  20  --  20  --    BILITOT 0.5  --  0.9  --  1.3*  --    < > = values in this interval not displayed.   Coagulation:     Recent Labs  Lab 03/13/18  1424  03/14/18  0330  03/14/18  1739 03/14/18  2351 03/15/18  0554   INR 1.1  --  1.1  --   --   --  1.7*   APTT  --   < > 26.9  < > 27.2 26.2 39.1*  39.1*   < > = values in this interval not displayed.  LDH:    Recent  Labs  Lab 03/13/18  0402 03/14/18  0330 03/15/18  0554    169 174     Microbiology:  Microbiology Results (last 7 days)     ** No results found for the last 168 hours. **          I have reviewed all pertinent labs within the past 24 hours.    Estimated Creatinine Clearance: 89.6 mL/min (based on SCr of 0.8 mg/dL).    Diagnostic Results:  I have reviewed all pertinent imaging results/findings within the past 24 hours.

## 2018-03-15 NOTE — PROGRESS NOTES
" [facility]  Adult Nutrition  Progress Note    SUMMARY       Recommendations    Recommendation/Intervention:   1. Continue current cardiac diet with Boost Plus. Would recommend decreasing to 1 Boost per meal to better match pt's home routine.   2. Encourage PO intake of meals and ONS.   3. RD following.    Goals: Intake >/=85% EEN/EPN  Nutrition Goal Status: new  Communication of RD Recs:  (POC)    Reason for Assessment    Reason for Assessment: length of stay  Diagnosis: gastrointestinal disease (anemia)  Relevant Medical History: CHF, CAD, HTN, hyperlipidemia, LVAD placement (7/2017), gastric polyp, s/p AICD  Interdisciplinary Rounds: attended    General Information Comments: Pt s/p exlap and small bowel resection on 3/13. Diet advanced this am. Pt reports good appetite, looking forward to lunch tray. Denies abdominal pain other than some surgical site pain. Denies further weight loss over past 2 months. Chart review indicates weight generally trending up. Drinks 2 Boost/day at home.    Nutrition Discharge Planning: Adequate PO intake to prevent further weight loss on cardiac diet    Nutrition Risk Screen    Nutrition Risk Screen: no indicators present    Nutrition/Diet History    Do you have any cultural, spiritual, Oriental orthodox conflicts, given your current situation?: none reported   Supplemental Drinks or Food Habits: Boost Plus (2/day)  Factors Affecting Nutritional Intake: None identified at this time    Anthropometrics    Temp: 98.8 °F (37.1 °C)  Height Method: Stated  Height: 5' 9" (175.3 cm)  Height (inches): 69 in  Weight Method: Standard Scale  Weight: 70.9 kg (156 lb 4.9 oz)  Weight (lb): 156.31 lb  Ideal Body Weight (IBW), Male: 160 lb  % Ideal Body Weight, Male (lb): 97.69 lb  BMI (Calculated): 23.1  BMI Grade: 18.5-24.9 - normal  Usual Body Weight (UBW), kg:  (generally trending up per chart review)    Lab/Procedures/Meds    Pertinent Labs Reviewed: reviewed  Pertinent Labs " "Comments: Alb 3.1, PAB 18, T.Bili 1.3, CRP 25.1  Pertinent Medications Reviewed: reviewed  Pertinent Medications Comments: lasix, Mg, pantoprazole, KCl, statin, warfarin, heparin    Physical Findings/Assessment    Overall Physical Appearance: loss of subcutaneous fat, loss of muscle mass  Oral/Mouth Cavity: endentulous  Skin: incision(s) (abdomen)    Estimated/Assessed Needs    Weight Used For Calorie Calculations: 70.9 kg (156 lb 4.9 oz)  Height: 5' 9" (175.3 cm)  Energy Calorie Requirements (kcal): 1843  Energy Need Method: [place]-St Jeor (x 1.25 (PAL))  RMR ([place]-St. Jeor Equation): 1474.38  Protein Requirements:  gm (1.2-1.4 gm/kg)  Weight Used For Protein Calculations: 70.9 kg (156 lb 4.9 oz)  Fluid Requirements (mL): per MD  RDA Method (mL): 1843     Nutrition Prescription Ordered    Current Diet Order: Cardiac  Oral Nutrition Supplement: Boost Plus (2/meal)    Evaluation of Received Nutrient/Fluid Intake    Comments: LBM 3/12  % Intake of Estimated Energy Needs: 0 - 25 %  % Meal Intake: Other: Diet just advanced    Nutrition Risk    Level of Risk/Frequency of Follow-up:  (F/u 1x weekly)     Assessment and Plan    Ileum ulcer    Contributing Nutrition Diagnosis  Increased protein needs    Related to (etiology):   Increased demand for healing    Signs and Symptoms (as evidenced by):   S/p exlab with small bowel resection 3/13     Interventions/Recommendations (treatment strategy):  See recs.    Nutrition Diagnosis Status:   New             Monitor and Evaluation    Food and Nutrient Intake: energy intake, food and beverage intake  Food and Nutrient Adminstration: diet order  Physical Activity and Function: nutrition-related ADLs and IADLs  Anthropometric Measurements: weight, weight change, body mass index  Biochemical Data, Medical Tests and Procedures: electrolyte and renal panel, gastrointestinal profile, glucose/endocrine profile, inflammatory profile  Nutrition-Focused Physical Findings: overall " appearance     Nutrition Follow-Up    RD Follow-up?: Yes

## 2018-03-15 NOTE — PLAN OF CARE
Problem: Patient Care Overview  Goal: Plan of Care Review    Recommendations     Recommendation/Intervention:   1. Continue current cardiac diet with Boost Plus. Would recommend decreasing to 1 Boost per meal to better match pt's home routine.   2. Encourage PO intake of meals and ONS.   3. RD following.     Goals: Intake >/=85% EEN/EPN  Nutrition Goal Status: new

## 2018-03-15 NOTE — ASSESSMENT & PLAN NOTE
- HM III placed on 7/2017 as DT  - Speed 5200  - Last TTE done 11/21/17: LVEDD 6.2, LVEF 10 - 15%, diastolic dysfunction, RV 4.9 and mild, mod depressed, PAS 41, IVC 15, ALONDRA q beat, septum midline  - RHC done 3/7/18 showed low filling pressures, moderate pulmonary HTN and normal CO/CI  - INR today 1.7 today. Restarted warfarin yesterday.   - Continue heparin bridge(low dose)  - Pulsatile; MAP's well-cotrolled  - VAD interrogation notable for PI events and occasional speed drops.

## 2018-03-15 NOTE — ASSESSMENT & PLAN NOTE
- Known NSAID-induced ileal ulcer  - UGI done 3/6/18 showed no obvious source of bleeding. Multiple gastric polyps were resected and retrieved. Path showed no malignancy; helicobacter -ve  - C-scope 3/9/18 without obvious source of bleeding  - Appreciate Gen Surgery's help. S/P exp lap and small bowel resection 3/13.  - Currently tolerating clear liquids. Will advance diet per Gen Reynold recs.

## 2018-03-15 NOTE — PROGRESS NOTES
[facility]  Heart Transplant  Progress Note    Patient Name: Suman Hayden  MRN: [id number]  Admission Date: 3/5/2018  Hospital Length of Stay: 10 days  Attending Physician: Ortega Leos MD  Primary Care Provider: Joe Ernst MD  Principal Problem:Anemia    Subjective:     Interval History: S/P small bowel resection 3/13. Doing well. No complaints. Pain well controlled. Passing frequent gas. No BM yet.    VAD interrogation completed this AM in the event changes needed to be made. Will continue to monitor for further issues.     Pulsatile: Yes, intermittent   VAD Sounds: HM3  Smooth  Problems / Issues / Alarms with VAD if any: None noted    VAD Interrogation:  TXP LVAD INTERROGATIONS 3/15/2018 3/14/2018 3/14/2018 3/14/2018 3/14/2018 3/14/2018 3/14/2018   Type HeartMate3 HeartMate3 HeartMate3 HeartMate3 HeartMate3 HeartMate3 HeartMate3   Flow 3.9 4.0 4.1 4.1 4.1 4.1 4.1   Speed 5200 5200 5200 5200 5200 5200 5200   PI 3.9 3.3 3.5 3.9 3.5 3.4 3.5   Power ([place]) 3.6 3.6 3.6 3.6 3.7 3.7 3.6   LSL 4800 4800 4800 - 4800 4800 4800   Pulsatility Intermittent pulse Intermittent pulse Pulse - Pulse Pulse Pulse   }      Continuous Infusions:   heparin (porcine) in 5 % dex 400 Units/hr (03/14/18 1800)     Scheduled Meds:   amLODIPine  5 mg Oral Daily    furosemide  20 mg Oral BID    lisinopril  5 mg Oral Daily    magnesium oxide  400 mg Oral BID    pantoprozole (PROTONIX) IV infusion  160 mg/hr Intravenous BID    potassium chloride  20 mEq Oral Daily    pravastatin  20 mg Oral QHS    sodium chloride 0.9%  10 mL Intravenous Q6H     PRN Meds:acetaminophen, acetaminophen, baclofen, benzonatate, dextrose 50%, glucagon (human recombinant), hydrALAZINE, hydrocodone-acetaminophen 5-325mg, ondansetron, ondansetron, polyethylene glycol, Flushing PICC Protocol **AND** sodium chloride 0.9% **AND** sodium chloride 0.9%, sodium chloride 0.9%    Review of patient's allergies indicates:   Allergen  Reactions    Pneumococcal 23-killian ps vaccine      Objective:     Vital Signs (Most Recent):  Temp: 99.2 °F (37.3 °C) (03/15/18 0458)  Pulse: 87 (03/15/18 0716)  Resp: 18 (03/15/18 0458)  BP: (!) 88/0 (03/15/18 0403)  SpO2: 95 % (03/15/18 0458) Vital Signs (24h Range):  Temp:  [97.8 °F (36.6 °C)-99.7 °F (37.6 °C)] 99.2 °F (37.3 °C)  Pulse:  [] 87  Resp:  [14-33] 18  SpO2:  [95 %-100 %] 95 %  BP: ()/(0-76) 88/0  Arterial Line BP: ()/(56-70) 107/68     Patient Vitals for the past 72 hrs (Last 3 readings):   Weight   03/14/18 0544 75.7 kg (166 lb 14.2 oz)   03/13/18 1227 74.8 kg (165 lb)   03/13/18 0800 70.8 kg (156 lb 1.4 oz)     Body mass index is 24.65 kg/m².      Intake/Output Summary (Last 24 hours) at 03/15/18 0733  Last data filed at 03/15/18 0600   Gross per 24 hour   Intake              770 ml   Output             1450 ml   Net             -680 ml        Physical Exam   Constitutional: He is oriented to person, place, and time. He appears well-developed and well-nourished.   HENT:   Head: Normocephalic and atraumatic.   Eyes: Conjunctivae and EOM are normal. Pupils are equal, round, and reactive to light.   Neck: Normal range of motion. Neck supple. No JVD present. No thyromegaly present.   Cardiovascular:   RRR with frequent ectopi. Smooth VAD hum, intermittently pulsatile   Pulmonary/Chest: Effort normal and breath sounds normal.   Abdominal: Soft. Bowel sounds are normal.   Musculoskeletal: Normal range of motion. He exhibits no edema.   Neurological: He is alert and oriented to person, place, and time.   Skin: Skin is warm and dry. Capillary refill takes 2 to 3 seconds.   Psychiatric: He has a normal mood and affect. His behavior is normal. Judgment and thought content normal.       Significant Labs:  CBC:    Recent Labs  Lab 03/13/18  2050 03/14/18  0330 03/15/18  0554   WBC 12.17 10.73 5.95   RBC 3.43* 3.28* 2.91*   HGB 9.4* 8.9* 7.9*   HCT 28.4* 26.9* 24.9*   * 148* 142*   MCV 83  82 86   MCH 27.4 27.1 27.1   MCHC 33.1 33.1 31.7*     BNP:    Recent Labs  Lab 03/09/18  0354 03/12/18  0307 03/14/18  0330   * 185* 1,077*     CMP:    Recent Labs  Lab 03/12/18  0307  03/13/18  1426 03/13/18  2105 03/14/18  0330 03/15/18  0554   GLU 87  < > 136* 132* 123* 107   CALCIUM 9.2  < > 9.2 9.3 9.1 9.3   ALBUMIN 3.0*  --  3.3*  --  3.1*  --    PROT 5.7*  --  6.2  --  6.2  --      < > 133* 136 138 140   K 4.1  < > 4.0 3.7 4.2 4.2   CO2 27  < > 23 26 24 26     < > 103 104 106 106   BUN 22  < > 25* 23 19 21   CREATININE 0.9  < > 1.1 1.1 0.9 0.8   ALKPHOS 58  --  58  --  55  --    ALT 9*  --  13  --  12  --    AST 14  --  20  --  20  --    BILITOT 0.5  --  0.9  --  1.3*  --    < > = values in this interval not displayed.   Coagulation:     Recent Labs  Lab 03/13/18  1424  03/14/18  0330  03/14/18  1739 03/14/18  2351 03/15/18  0554   INR 1.1  --  1.1  --   --   --  1.7*   APTT  --   < > 26.9  < > 27.2 26.2 39.1*  39.1*   < > = values in this interval not displayed.  LDH:    Recent Labs  Lab 03/13/18  0402 03/14/18  0330 03/15/18  0554    169 174     Microbiology:  Microbiology Results (last 7 days)     ** No results found for the last 168 hours. **          I have reviewed all pertinent labs within the past 24 hours.    Estimated Creatinine Clearance: 89.6 mL/min (based on SCr of 0.8 mg/dL).    Diagnostic Results:  I have reviewed all pertinent imaging results/findings within the past 24 hours.    Assessment and Plan:     No notes on file    * Anemia    - S/P Small Bowel resection 3/13/18.   - Continue oral iron replacement  - continue PPI BID push and monitor H/H closely  - continue heparin gtt(low dose) for LVAD          LVAD (left ventricular assist device) present    - HM III placed on 7/2017 as DT  - Speed 5200  - Last TTE done 11/21/17: LVEDD 6.2, LVEF 10 - 15%, diastolic dysfunction, RV 4.9 and mild, mod depressed, PAS 41, IVC 15, ALONDRA q beat, septum midline  - RHC done 3/7/18  "showed low filling pressures, moderate pulmonary HTN and normal CO/CI  - INR today 1.7 today. Restarted warfarin yesterday.   - Continue heparin bridge(low dose)  - Pulsatile; MAP's well-cotrolled  - VAD interrogation notable for PI events and occasional speed drops.            Ileum ulcer    - Known NSAID-induced ileal ulcer  - UGI done 3/6/18 showed no obvious source of bleeding. Multiple gastric polyps were resected and retrieved. Path showed no malignancy; helicobacter -ve  - C-scope 3/9/18 without obvious source of bleeding  - Appreciate Gen Surgery's help. S/P exp lap and small bowel resection 3/13.  - Currently tolerating clear liquids. Will advance diet per Gen Reynold recs.         Hepatitis B core antibody positive since 2012    - Appreciate Hepatology's help: "Pt has evidence of immunity to HBV and no evidence of any viremia. Given above, pt does NOT require treatment with lamivudine if he were to undergo transplantation. He should follow up with hepatology clinic if he were to undergo transplant."  - Hep B viral DNA 3/8/18 < 10              Kishore Paz NP  Heart Transplant  [facility]  "

## 2018-03-15 NOTE — ASSESSMENT & PLAN NOTE
S/p ex-lap with SBR on 3/13/18      Recommend advancing diet to regular diet/cardiac diet  Continue anticoagulation per primary team  Continue LVAD management per primary team    Will continue to follow along

## 2018-03-15 NOTE — ASSESSMENT & PLAN NOTE
"- Appreciate Hepatology's help: "Pt has evidence of immunity to HBV and no evidence of any viremia. Given above, pt does NOT require treatment with lamivudine if he were to undergo transplantation. He should follow up with hepatology clinic if he were to undergo transplant."  - Hep B viral DNA 3/8/18 < 10    "

## 2018-03-15 NOTE — PROGRESS NOTES
Pt arrived on unit via bed with wife at side.  Telemetry placed.  Pt on RA with Heparin gtt infusing.  LVAD numbers WNL and VSS; no alarms noted.  Night shift to reconcile LVAD equipment.  Pt and family oriented to room and unit.  Bed in lowest position with siderails up x2.  Call bell within reach; pt instructed to call for assistance.

## 2018-03-15 NOTE — PLAN OF CARE
Problem: Patient Care Overview  Goal: Plan of Care Review  Outcome: Ongoing (interventions implemented as appropriate)  Patient remains free of falls or injury. Patient complains of pain; treated with PO pain medication. S/p exploratory lap and bowel resection on 3/13/18; stepped down to CSU on 3/14/18. Clear liquid diet to progress as tolerated. MS incision CJ with staples; CDI with no drainage noted. Continued on heparin drip; aPTTs q6 hours. Current INR 1.1. Plan of care reviewed with patient and wife.

## 2018-03-15 NOTE — ASSESSMENT & PLAN NOTE
Contributing Nutrition Diagnosis  Increased protein needs    Related to (etiology):   Increased demand for healing    Signs and Symptoms (as evidenced by):   S/p exlab with small bowel resection 3/13     Interventions/Recommendations (treatment strategy):  See recs.    Nutrition Diagnosis Status:   New

## 2018-03-15 NOTE — PROGRESS NOTES
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 Post-op course complicated by Gi bleeding had double balloon enteropscopy in 12/2017 (bleeding ulcer)  and 1/25/2018 no bleeding but ulcer found.  Retrograde DBE on 1/26/18 which was negative. He had done well for about a month with no further episodes of bleeding, and no dark stools. Then this past week he had is labs checked and was found to be anemic again. He was admitted to the hospital and did pass some melanic stool. He had a repeat upper endoscopy where several previously known about polyps were found but none actively bleeding.     Post-Op Info:  Procedure(s) (LRB):  EXPLORATORY-LAPAROTOMY with small bowel resection  (N/A)  RESECTION-BOWEL   2 Days Post-Op     Interval History: No acute events overnight. Pt seen and examined at the bedside. Vital signs stable. Abdominal pain improving. Tolerating PO. No nausea/vomiting. Passing gas.     Medications:  Continuous Infusions:   heparin (porcine) in 5 % dex 400 Units/hr (03/14/18 1800)     Scheduled Meds:   amLODIPine  5 mg Oral Daily    furosemide  20 mg Oral BID    lisinopril  5 mg Oral Daily    magnesium oxide  400 mg Oral BID    pantoprozole (PROTONIX) IV infusion  160 mg/hr Intravenous BID    potassium chloride  20 mEq Oral Daily    pravastatin  20 mg Oral QHS    sodium chloride 0.9%  10 mL Intravenous Q6H     PRN Meds:acetaminophen, acetaminophen, baclofen, benzonatate, dextrose 50%, glucagon (human recombinant), hydrALAZINE, hydrocodone-acetaminophen 5-325mg, ondansetron, ondansetron, polyethylene glycol, Flushing PICC Protocol **AND** sodium chloride 0.9% **AND** sodium chloride 0.9%, sodium chloride 0.9%     Review of patient's allergies indicates:   Allergen Reactions    Pneumococcal 23-klilian ps vaccine      Objective:     Vital Signs (Most Recent):  Temp: 99.2 °F (37.3 °C) (03/15/18  0458)  Pulse: 87 (03/15/18 0716)  Resp: 18 (03/15/18 0458)  BP: (!) 88/0 (03/15/18 0403)  SpO2: 95 % (03/15/18 0458) Vital Signs (24h Range):  Temp:  [97.8 °F (36.6 °C)-99.7 °F (37.6 °C)] 99.2 °F (37.3 °C)  Pulse:  [] 87  Resp:  [16-33] 18  SpO2:  [95 %-100 %] 95 %  BP: ()/(0-76) 88/0  Arterial Line BP: ()/(56-70) 107/68     Weight: 70.9 kg (156 lb 4.9 oz)  Body mass index is 23.08 kg/m².    Intake/Output - Last 3 Shifts       03/13 0700 - 03/14 0659 03/14 0700 - 03/15 0659 03/15 0700 - 03/16 0659    P.O.  0     I.V. (mL/kg) 1961 (25.9) 770 (10.2)     Blood 1293.5      Total Intake(mL/kg) 3254.5 (43) 770 (10.2)     Urine (mL/kg/hr) 1200 (0.7) 1600 (0.9)     Stool  0 (0)     Total Output 1200 1600      Net +2054.5 -830             Urine Occurrence  1 x     Stool Occurrence  0 x           Physical Exam   Constitutional: He is oriented to person, place, and time. He appears well-developed and well-nourished. No distress.   HENT:   Head: Normocephalic and atraumatic.   Eyes: Pupils are equal, round, and reactive to light. No scleral icterus.   Cardiovascular: Normal rate and regular rhythm.    Pulmonary/Chest: Effort normal. No respiratory distress.   Abdominal: Soft. He exhibits no distension. There is tenderness (appropriate incisional).   Soft, incision with surgical dressing in place c/d/i   Neurological: He is alert and oriented to person, place, and time.   Skin: Skin is warm and dry. No rash noted. He is not diaphoretic. No erythema.   Psychiatric: He has a normal mood and affect.       Significant Labs:  CBC:     Recent Labs  Lab 03/15/18  0554   WBC 5.95   RBC 2.91*   HGB 7.9*   HCT 24.9*   *   MCV 86   MCH 27.1   MCHC 31.7*     CMP:     Recent Labs  Lab 03/14/18  0330 03/15/18  0554   * 107   CALCIUM 9.1 9.3   ALBUMIN 3.1*  --    PROT 6.2  --     140   K 4.2 4.2   CO2 24 26    106   BUN 19 21   CREATININE 0.9 0.8   ALKPHOS 55  --    ALT 12  --    AST 20  --     BILITOT 1.3*  --        Significant Diagnostics:  None    Assessment/Plan:     Ileum ulcer    S/p ex-lap with SBR on 3/13/18      Recommend advancing diet to regular diet/cardiac diet  Continue anticoagulation per primary team  Continue LVAD management per primary team    Will continue to follow along            Baljinder Slater MD  General Surgery  [facility]

## 2018-03-15 NOTE — SUBJECTIVE & OBJECTIVE
Interval History: No acute events overnight. Pt seen and examined at the bedside. Vital signs stable. Abdominal pain improving. Tolerating PO. No nausea/vomiting. Passing gas.     Medications:  Continuous Infusions:   heparin (porcine) in 5 % dex 400 Units/hr (03/14/18 1800)     Scheduled Meds:   amLODIPine  5 mg Oral Daily    furosemide  20 mg Oral BID    lisinopril  5 mg Oral Daily    magnesium oxide  400 mg Oral BID    pantoprozole (PROTONIX) IV infusion  160 mg/hr Intravenous BID    potassium chloride  20 mEq Oral Daily    pravastatin  20 mg Oral QHS    sodium chloride 0.9%  10 mL Intravenous Q6H     PRN Meds:acetaminophen, acetaminophen, baclofen, benzonatate, dextrose 50%, glucagon (human recombinant), hydrALAZINE, hydrocodone-acetaminophen 5-325mg, ondansetron, ondansetron, polyethylene glycol, Flushing PICC Protocol **AND** sodium chloride 0.9% **AND** sodium chloride 0.9%, sodium chloride 0.9%     Review of patient's allergies indicates:   Allergen Reactions    Pneumococcal 23-killian ps vaccine      Objective:     Vital Signs (Most Recent):  Temp: 99.2 °F (37.3 °C) (03/15/18 0458)  Pulse: 87 (03/15/18 0716)  Resp: 18 (03/15/18 0458)  BP: (!) 88/0 (03/15/18 0403)  SpO2: 95 % (03/15/18 0458) Vital Signs (24h Range):  Temp:  [97.8 °F (36.6 °C)-99.7 °F (37.6 °C)] 99.2 °F (37.3 °C)  Pulse:  [] 87  Resp:  [16-33] 18  SpO2:  [95 %-100 %] 95 %  BP: ()/(0-76) 88/0  Arterial Line BP: ()/(56-70) 107/68     Weight: 70.9 kg (156 lb 4.9 oz)  Body mass index is 23.08 kg/m².    Intake/Output - Last 3 Shifts       03/13 0700 - 03/14 0659 03/14 0700 - 03/15 0659 03/15 0700 - 03/16 0659    P.O.  0     I.V. (mL/kg) 1961 (25.9) 770 (10.2)     Blood 1293.5      Total Intake(mL/kg) 3254.5 (43) 770 (10.2)     Urine (mL/kg/hr) 1200 (0.7) 1600 (0.9)     Stool  0 (0)     Total Output 1200 1600      Net +2054.5 -830             Urine Occurrence  1 x     Stool Occurrence  0 x           Physical Exam    Constitutional: He is oriented to person, place, and time. He appears well-developed and well-nourished. No distress.   HENT:   Head: Normocephalic and atraumatic.   Eyes: Pupils are equal, round, and reactive to light. No scleral icterus.   Cardiovascular: Normal rate and regular rhythm.    Pulmonary/Chest: Effort normal. No respiratory distress.   Abdominal: Soft. He exhibits no distension. There is tenderness (appropriate incisional).   Soft, incision with surgical dressing in place c/d/i   Neurological: He is alert and oriented to person, place, and time.   Skin: Skin is warm and dry. No rash noted. He is not diaphoretic. No erythema.   Psychiatric: He has a normal mood and affect.       Significant Labs:  CBC:     Recent Labs  Lab 03/15/18  0554   WBC 5.95   RBC 2.91*   HGB 7.9*   HCT 24.9*   *   MCV 86   MCH 27.1   MCHC 31.7*     CMP:     Recent Labs  Lab 03/14/18  0330 03/15/18  0554   * 107   CALCIUM 9.1 9.3   ALBUMIN 3.1*  --    PROT 6.2  --     140   K 4.2 4.2   CO2 24 26    106   BUN 19 21   CREATININE 0.9 0.8   ALKPHOS 55  --    ALT 12  --    AST 20  --    BILITOT 1.3*  --        Significant Diagnostics:  None

## 2018-03-15 NOTE — SIGNIFICANT EVENT
Patient transported to room 304 via hospital bed on telemetry, and all of his LVAD equipment.  Patient is stable at this time, following all commands.  Spouse is at the bedside and accompanying the patient to his new location.  Report called ahead to ORIANA Kraus

## 2018-03-16 NOTE — NURSING
Heparin infusion -    ptt was 23.2 spoke to EUSEBIA Byers NP -  Orders given  to continue heparin infusion at current rate. Updated pt and spouse

## 2018-03-16 NOTE — ASSESSMENT & PLAN NOTE
- HM III placed on 7/2017 as DT  - Speed 5200  - Last TTE done 1/21/17: LVEDD 6.2, LVEF 10 - 15%, diastolic dysfunction, RV 4.9 and mild, mod depressed, PAS 41, IVC 15, ALONDRA q beat, septum midline  - RHC done 3/7/18 showed low filling pressures, moderate pulmonary HTN and normal CO/CI  - INR today 1.1 today. Restarted warfarin.   - Continue heparin bridge(low dose fixed rate 400u/hr)  - Pulsatile; MAP's well-cotrolled  - VAD interrogation notable for PI events and occasional speed drops.

## 2018-03-16 NOTE — PLAN OF CARE
Problem: Patient Care Overview  Goal: Plan of Care Review  Outcome: Ongoing (interventions implemented as appropriate)  Patient remains free of falls or injury. Patient complains of pain; treated with PO pain medication. S/p exploratory lap and bowel resection on 3/13/18; stepped down to CSU on 3/14/18. MS incision with staples dressing with island dressing d/t bleeding. Continued on heparin drip; aPTTs q6 hours. Current INR 1.7. Plan of care reviewed with patient and wife.

## 2018-03-16 NOTE — PROGRESS NOTES
[facility]  Heart Transplant  Progress Note    Patient Name: Suman Hayden  MRN: [id number]  Admission Date: 3/5/2018  Hospital Length of Stay: 11 days  Attending Physician: Ortega Leos MD  Primary Care Provider: Joe Ernst MD  Principal Problem:Anemia    Subjective:     Interval History: S/P small bowel resection 3/13. Pain well controlled with PO pain meds. He got nauseous after eating meatball for dinner last night. Surgical site with some oozing overnight. Passing frequent gas. No BM yet.   VAD interrogation completed this AM in the event changes needed to be made. Will continue to monitor for further issues.     Pulsatile: Yes, intermittent   VAD Sounds: HM3  Smooth  Problems / Issues / Alarms with VAD if any: None noted    VAD Interrogation:  TXP LVAD INTERROGATIONS 3/16/2018 3/16/2018 3/16/2018 3/15/2018 3/15/2018 3/15/2018 3/15/2018   Type HeartMate3 HeartMate3 HeartMate3 HeartMate3 HeartMate3 HeartMate3 HeartMate3   Flow 4.1 3.7 3.9 4.0 4.1 4.0 4.2   Speed 5200 5200 5200 5200 5200 5200 5200   PI 3.5 4.6 4.0 4.0 3.4 3.6 3.5   Power ([place]) 3.6 3.6 3.5 3.5 3.6 3.5 3.5   LSL - - 4800 4800 4800 - -   Pulsatility - - No Pulse Intermittent pulse Intermittent pulse - -   }      Continuous Infusions:   heparin (porcine) in 5 % dex 400 Units/hr (03/15/18 2138)     Scheduled Meds:   amLODIPine  5 mg Oral Daily    furosemide  20 mg Oral BID    lisinopril  5 mg Oral Daily    magnesium oxide  400 mg Oral BID    pantoprozole (PROTONIX) IV infusion  160 mg/hr Intravenous BID    potassium chloride  20 mEq Oral Daily    pravastatin  20 mg Oral QHS    sodium chloride 0.9%  10 mL Intravenous Q6H     PRN Meds:acetaminophen, acetaminophen, baclofen, benzonatate, dextrose 50%, glucagon (human recombinant), hydrALAZINE, hydrocodone-acetaminophen 5-325mg, ondansetron, ondansetron, polyethylene glycol, Flushing PICC Protocol **AND** sodium chloride 0.9% **AND** sodium chloride 0.9%, sodium  chloride 0.9%    Review of patient's allergies indicates:   Allergen Reactions    Pneumococcal 23-killian ps vaccine      Objective:     Vital Signs (Most Recent):  Temp: 98.9 °F (37.2 °C) (03/16/18 0746)  Pulse: 91 (03/16/18 0746)  Resp: 18 (03/16/18 0746)  BP: (!) 90/0 (03/16/18 0748)  SpO2: 99 % (03/16/18 0746) Vital Signs (24h Range):  Temp:  [98.8 °F (37.1 °C)-101.3 °F (38.5 °C)] 98.9 °F (37.2 °C)  Pulse:  [] 91  Resp:  [16-18] 18  SpO2:  [95 %-99 %] 99 %  BP: ()/(0-65) 90/0     Patient Vitals for the past 72 hrs (Last 3 readings):   Weight   03/16/18 0746 73.7 kg (162 lb 7.7 oz)   03/15/18 0700 70.9 kg (156 lb 4.9 oz)   03/14/18 0544 75.7 kg (166 lb 14.2 oz)     Body mass index is 23.99 kg/m².      Intake/Output Summary (Last 24 hours) at 03/16/18 0806  Last data filed at 03/16/18 0751   Gross per 24 hour   Intake              540 ml   Output             1400 ml   Net             -860 ml        Physical Exam   Constitutional: He is oriented to person, place, and time. He appears well-developed and well-nourished.   HENT:   Head: Normocephalic and atraumatic.   Eyes: Conjunctivae and EOM are normal. Pupils are equal, round, and reactive to light.   Neck: Normal range of motion. Neck supple. No JVD present. No thyromegaly present.   Cardiovascular:   RRR with frequent ectopi. Smooth VAD hum, intermittently pulsatile   Pulmonary/Chest: Effort normal and breath sounds normal.   Abdominal: Soft. Bowel sounds are normal.   Musculoskeletal: Normal range of motion. He exhibits no edema.   Neurological: He is alert and oriented to person, place, and time.   Skin: Skin is warm and dry. Capillary refill takes 2 to 3 seconds.   Psychiatric: He has a normal mood and affect. His behavior is normal. Judgment and thought content normal.       Significant Labs:  CBC:    Recent Labs  Lab 03/13/18 2050 03/14/18  0330 03/15/18  0554   WBC 12.17 10.73 5.95   RBC 3.43* 3.28* 2.91*   HGB 9.4* 8.9* 7.9*   HCT 28.4* 26.9*  24.9*   * 148* 142*   MCV 83 82 86   MCH 27.4 27.1 27.1   MCHC 33.1 33.1 31.7*     BNP:    Recent Labs  Lab 03/12/18  0307 03/14/18  0330   * 1,077*     CMP:    Recent Labs  Lab 03/13/18  1426  03/14/18  0330 03/15/18  0554 03/16/18  0512   *  < > 123* 107 102   CALCIUM 9.2  < > 9.1 9.3 9.3   ALBUMIN 3.3*  --  3.1*  --  3.0*   PROT 6.2  --  6.2  --  6.1   *  < > 138 140 140   K 4.0  < > 4.2 4.2 4.1   CO2 23  < > 24 26 27     < > 106 106 106   BUN 25*  < > 19 21 20   CREATININE 1.1  < > 0.9 0.8 0.8   ALKPHOS 58  --  55  --  52*   ALT 13  --  12  --  8*   AST 20  --  20  --  14   BILITOT 0.9  --  1.3*  --  0.9   < > = values in this interval not displayed.   Coagulation:     Recent Labs  Lab 03/14/18  0330  03/15/18  0554  03/15/18  1820 03/16/18  0004 03/16/18  0512   INR 1.1  --  1.7*  --   --   --  1.0   APTT 26.9  < > 39.1*  39.1*  < > 33.3* 27.1 24.7  24.7   < > = values in this interval not displayed.  LDH:    Recent Labs  Lab 03/14/18  0330 03/15/18  0554 03/16/18  0512    174 154     Microbiology:  Microbiology Results (last 7 days)     ** No results found for the last 168 hours. **          I have reviewed all pertinent labs within the past 24 hours.    Estimated Creatinine Clearance: 89.6 mL/min (based on SCr of 0.8 mg/dL).    Diagnostic Results:  I have reviewed all pertinent imaging results/findings within the past 24 hours.    Assessment and Plan:     No notes on file    * Anemia    - S/P Small Bowel resection 3/13/18.   - Continue oral iron replacement  - continue PPI BID push and monitor H/H closely  - continue heparin gtt(low dose) for LVAD  - H/H pending this am.         LVAD (left ventricular assist device) present    - HM III placed on 7/2017 as DT  - Speed 5200  - Last TTE done 1/21/17: LVEDD 6.2, LVEF 10 - 15%, diastolic dysfunction, RV 4.9 and mild, mod depressed, PAS 41, IVC 15, ALONDRA q beat, septum midline  - RHC done 3/7/18 showed low filling pressures,  "moderate pulmonary HTN and normal CO/CI  - INR today 1.1 today. Restarted warfarin.   - Continue heparin bridge(low dose fixed rate 400u/hr)  - Pulsatile; MAP's well-cotrolled  - VAD interrogation notable for PI events and occasional speed drops.            Ileum ulcer    - Known NSAID-induced ileal ulcer  - UGI done 3/6/18 showed no obvious source of bleeding. Multiple gastric polyps were resected and retrieved. Path showed no malignancy; helicobacter -ve  - C-scope 3/9/18 without obvious source of bleeding  - Appreciate Gen Surgery's help. S/P exp lap and small bowel resection 3/13.        Hepatitis B core antibody positive since 2012    - Appreciate Hepatology's help: "Pt has evidence of immunity to HBV and no evidence of any viremia. Given above, pt does NOT require treatment with lamivudine if he were to undergo transplantation. He should follow up with hepatology clinic if he were to undergo transplant."  - Hep B viral DNA 3/8/18 < 10              Kishore Paz NP  Heart Transplant  [facility]  "

## 2018-03-16 NOTE — ASSESSMENT & PLAN NOTE
- S/P Small Bowel resection 3/13/18.   - Continue oral iron replacement  - continue PPI BID push and monitor H/H closely  - continue heparin gtt(low dose) for LVAD  - H/H pending this am.

## 2018-03-16 NOTE — SUBJECTIVE & OBJECTIVE
Interval History: S/P small bowel resection 3/13. Pain well controlled with PO pain meds. He got nauseous after eating meatball for dinner last night. Surgical site with some oozing overnight. Passing frequent gas. No BM yet.     Continuous Infusions:   heparin (porcine) in 5 % dex 400 Units/hr (03/15/18 2138)     Scheduled Meds:   amLODIPine  5 mg Oral Daily    furosemide  20 mg Oral BID    lisinopril  5 mg Oral Daily    magnesium oxide  400 mg Oral BID    pantoprozole (PROTONIX) IV infusion  160 mg/hr Intravenous BID    potassium chloride  20 mEq Oral Daily    pravastatin  20 mg Oral QHS    sodium chloride 0.9%  10 mL Intravenous Q6H     PRN Meds:acetaminophen, acetaminophen, baclofen, benzonatate, dextrose 50%, glucagon (human recombinant), hydrALAZINE, hydrocodone-acetaminophen 5-325mg, ondansetron, ondansetron, polyethylene glycol, Flushing PICC Protocol **AND** sodium chloride 0.9% **AND** sodium chloride 0.9%, sodium chloride 0.9%    Review of patient's allergies indicates:   Allergen Reactions    Pneumococcal 23-killian ps vaccine      Objective:     Vital Signs (Most Recent):  Temp: 98.9 °F (37.2 °C) (03/16/18 0746)  Pulse: 91 (03/16/18 0746)  Resp: 18 (03/16/18 0746)  BP: (!) 90/0 (03/16/18 0748)  SpO2: 99 % (03/16/18 0746) Vital Signs (24h Range):  Temp:  [98.8 °F (37.1 °C)-101.3 °F (38.5 °C)] 98.9 °F (37.2 °C)  Pulse:  [] 91  Resp:  [16-18] 18  SpO2:  [95 %-99 %] 99 %  BP: ()/(0-65) 90/0     Patient Vitals for the past 72 hrs (Last 3 readings):   Weight   03/16/18 0746 73.7 kg (162 lb 7.7 oz)   03/15/18 0700 70.9 kg (156 lb 4.9 oz)   03/14/18 0544 75.7 kg (166 lb 14.2 oz)     Body mass index is 23.99 kg/m².      Intake/Output Summary (Last 24 hours) at 03/16/18 0806  Last data filed at 03/16/18 0751   Gross per 24 hour   Intake              540 ml   Output             1400 ml   Net             -860 ml        Physical Exam   Constitutional: He is oriented to person, place, and time. He  appears well-developed and well-nourished.   HENT:   Head: Normocephalic and atraumatic.   Eyes: Conjunctivae and EOM are normal. Pupils are equal, round, and reactive to light.   Neck: Normal range of motion. Neck supple. No JVD present. No thyromegaly present.   Cardiovascular:   RRR with frequent ectopi. Smooth VAD hum, intermittently pulsatile   Pulmonary/Chest: Effort normal and breath sounds normal.   Abdominal: Soft. Bowel sounds are normal.   Musculoskeletal: Normal range of motion. He exhibits no edema.   Neurological: He is alert and oriented to person, place, and time.   Skin: Skin is warm and dry. Capillary refill takes 2 to 3 seconds.   Psychiatric: He has a normal mood and affect. His behavior is normal. Judgment and thought content normal.       Significant Labs:  CBC:    Recent Labs  Lab 03/13/18 2050 03/14/18  0330 03/15/18  0554   WBC 12.17 10.73 5.95   RBC 3.43* 3.28* 2.91*   HGB 9.4* 8.9* 7.9*   HCT 28.4* 26.9* 24.9*   * 148* 142*   MCV 83 82 86   MCH 27.4 27.1 27.1   MCHC 33.1 33.1 31.7*     BNP:    Recent Labs  Lab 03/12/18  0307 03/14/18  0330   * 1,077*     CMP:    Recent Labs  Lab 03/13/18  1426  03/14/18  0330 03/15/18  0554 03/16/18  0512   *  < > 123* 107 102   CALCIUM 9.2  < > 9.1 9.3 9.3   ALBUMIN 3.3*  --  3.1*  --  3.0*   PROT 6.2  --  6.2  --  6.1   *  < > 138 140 140   K 4.0  < > 4.2 4.2 4.1   CO2 23  < > 24 26 27     < > 106 106 106   BUN 25*  < > 19 21 20   CREATININE 1.1  < > 0.9 0.8 0.8   ALKPHOS 58  --  55  --  52*   ALT 13  --  12  --  8*   AST 20  --  20  --  14   BILITOT 0.9  --  1.3*  --  0.9   < > = values in this interval not displayed.   Coagulation:     Recent Labs  Lab 03/14/18  0330  03/15/18  0554  03/15/18  1820 03/16/18  0004 03/16/18  0512   INR 1.1  --  1.7*  --   --   --  1.0   APTT 26.9  < > 39.1*  39.1*  < > 33.3* 27.1 24.7  24.7   < > = values in this interval not displayed.  LDH:    Recent Labs  Lab 03/14/18  0330  03/15/18  0554 03/16/18  0512    174 154     Microbiology:  Microbiology Results (last 7 days)     ** No results found for the last 168 hours. **          I have reviewed all pertinent labs within the past 24 hours.    Estimated Creatinine Clearance: 89.6 mL/min (based on SCr of 0.8 mg/dL).    Diagnostic Results:  I have reviewed all pertinent imaging results/findings within the past 24 hours.

## 2018-03-16 NOTE — ASSESSMENT & PLAN NOTE
S/p ex-lap with SBR on 3/13/18    Recommend starting patient back on clear liquid diet  Continue anticoagulation per primary team; will reassess wound later today and see if further intervention is needed to decrease drainage  Continue LVAD management per primary team    Will continue to follow along

## 2018-03-16 NOTE — SUBJECTIVE & OBJECTIVE
Interval History: No acute events overnight  No BM/+flatus  Some belching overnight  Pain well controlled  Abdominal incision continues to ooze    Medications:  Continuous Infusions:   heparin (porcine) in 5 % dex 400 Units/hr (03/15/18 2138)     Scheduled Meds:   amLODIPine  5 mg Oral Daily    furosemide  20 mg Oral BID    lisinopril  5 mg Oral Daily    magnesium oxide  400 mg Oral BID    pantoprozole (PROTONIX) IV infusion  160 mg/hr Intravenous BID    potassium chloride  20 mEq Oral Daily    pravastatin  20 mg Oral QHS    sodium chloride 0.9%  10 mL Intravenous Q6H     PRN Meds:acetaminophen, acetaminophen, baclofen, benzonatate, dextrose 50%, glucagon (human recombinant), hydrALAZINE, hydrocodone-acetaminophen 5-325mg, ondansetron, ondansetron, polyethylene glycol, Flushing PICC Protocol **AND** sodium chloride 0.9% **AND** sodium chloride 0.9%, sodium chloride 0.9%     Review of patient's allergies indicates:   Allergen Reactions    Pneumococcal 23-killian ps vaccine      Objective:     Vital Signs (Most Recent):  Temp: 99.1 °F (37.3 °C) (03/16/18 0430)  Pulse: 92 (03/16/18 0501)  Resp: 16 (03/16/18 0430)  BP: (!) 84/0 (03/16/18 0430)  SpO2: 97 % (03/16/18 0430) Vital Signs (24h Range):  Temp:  [98.8 °F (37.1 °C)-101.3 °F (38.5 °C)] 99.1 °F (37.3 °C)  Pulse:  [] 92  Resp:  [16-18] 16  SpO2:  [95 %-99 %] 97 %  BP: (68-97)/(0-65) 84/0     Weight: 70.9 kg (156 lb 4.9 oz)  Body mass index is 23.08 kg/m².    Intake/Output - Last 3 Shifts       03/14 0700 - 03/15 0659 03/15 0700 - 03/16 0659 03/16 0700 - 03/17 0659    P.O. 0 540     I.V. (mL/kg) 770 (10.2)      Blood       Total Intake(mL/kg) 770 (10.2) 540 (7.6)     Urine (mL/kg/hr) 1600 (0.9) 1100 (0.6)     Stool 0 (0) 0 (0)     Total Output 1600 1100      Net -830 -560             Urine Occurrence 1 x      Stool Occurrence 0 x 0 x           Physical Exam   Constitutional: He is oriented to person, place, and time. He appears well-developed and  well-nourished. No distress.   HENT:   Head: Normocephalic and atraumatic.   Eyes: Pupils are equal, round, and reactive to light. No scleral icterus.   Cardiovascular: Normal rate and regular rhythm.    Pulmonary/Chest: Effort normal. No respiratory distress.   Abdominal: Soft. He exhibits no distension. There is tenderness (appropriate incisional).   Soft, incision with dressing saturated with sanguinous drainage resembling old hematoma    Neurological: He is alert and oriented to person, place, and time.   Skin: Skin is warm and dry. No rash noted. He is not diaphoretic. No erythema.   Psychiatric: He has a normal mood and affect.       Significant Labs:  CBC:   Recent Labs  Lab 03/15/18  0554   WBC 5.95   RBC 2.91*   HGB 7.9*   HCT 24.9*   *   MCV 86   MCH 27.1   MCHC 31.7*     CMP:   Recent Labs  Lab 03/16/18  0512      CALCIUM 9.3   ALBUMIN 3.0*   PROT 6.1      K 4.1   CO2 27      BUN 20   CREATININE 0.8   ALKPHOS 52*   ALT 8*   AST 14   BILITOT 0.9     Coagulation:   Recent Labs  Lab 03/16/18  0512   LABPROT 10.8   INR 1.0   APTT 24.7  24.7       Significant Diagnostics:  No new imaging

## 2018-03-16 NOTE — ASSESSMENT & PLAN NOTE
- Known NSAID-induced ileal ulcer  - UGI done 3/6/18 showed no obvious source of bleeding. Multiple gastric polyps were resected and retrieved. Path showed no malignancy; helicobacter -ve  - C-scope 3/9/18 without obvious source of bleeding  - Appreciate Gen Surgery's help. S/P exp lap and small bowel resection 3/13.

## 2018-03-16 NOTE — PROGRESS NOTES
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 Post-op course complicated by Gi bleeding had double balloon enteropscopy in 12/2017 (bleeding ulcer)  and 1/25/2018 no bleeding but ulcer found.  Retrograde DBE on 1/26/18 which was negative. He had done well for about a month with no further episodes of bleeding, and no dark stools. Then this past week he had is labs checked and was found to be anemic again. He was admitted to the hospital and did pass some melanic stool. He had a repeat upper endoscopy where several previously known about polyps were found but none actively bleeding.     Post-Op Info:  Procedure(s) (LRB):  EXPLORATORY-LAPAROTOMY with small bowel resection  (N/A)  RESECTION-BOWEL   3 Days Post-Op     Interval History: No acute events overnight  No BM/+flatus  Some belching overnight  Pain well controlled  Abdominal incision continues to ooze    Medications:  Continuous Infusions:   heparin (porcine) in 5 % dex 400 Units/hr (03/15/18 2138)     Scheduled Meds:   amLODIPine  5 mg Oral Daily    furosemide  20 mg Oral BID    lisinopril  5 mg Oral Daily    magnesium oxide  400 mg Oral BID    pantoprozole (PROTONIX) IV infusion  160 mg/hr Intravenous BID    potassium chloride  20 mEq Oral Daily    pravastatin  20 mg Oral QHS    sodium chloride 0.9%  10 mL Intravenous Q6H     PRN Meds:acetaminophen, acetaminophen, baclofen, benzonatate, dextrose 50%, glucagon (human recombinant), hydrALAZINE, hydrocodone-acetaminophen 5-325mg, ondansetron, ondansetron, polyethylene glycol, Flushing PICC Protocol **AND** sodium chloride 0.9% **AND** sodium chloride 0.9%, sodium chloride 0.9%     Review of patient's allergies indicates:   Allergen Reactions    Pneumococcal 23-killian ps vaccine      Objective:     Vital Signs (Most Recent):  Temp: 99.1 °F (37.3 °C) (03/16/18 0430)  Pulse: 92 (03/16/18  0501)  Resp: 16 (03/16/18 0430)  BP: (!) 84/0 (03/16/18 0430)  SpO2: 97 % (03/16/18 0430) Vital Signs (24h Range):  Temp:  [98.8 °F (37.1 °C)-101.3 °F (38.5 °C)] 99.1 °F (37.3 °C)  Pulse:  [] 92  Resp:  [16-18] 16  SpO2:  [95 %-99 %] 97 %  BP: (68-97)/(0-65) 84/0     Weight: 70.9 kg (156 lb 4.9 oz)  Body mass index is 23.08 kg/m².    Intake/Output - Last 3 Shifts       03/14 0700 - 03/15 0659 03/15 0700 - 03/16 0659 03/16 0700 - 03/17 0659    P.O. 0 540     I.V. (mL/kg) 770 (10.2)      Blood       Total Intake(mL/kg) 770 (10.2) 540 (7.6)     Urine (mL/kg/hr) 1600 (0.9) 1100 (0.6)     Stool 0 (0) 0 (0)     Total Output 1600 1100      Net -830 -560             Urine Occurrence 1 x      Stool Occurrence 0 x 0 x           Physical Exam   Constitutional: He is oriented to person, place, and time. He appears well-developed and well-nourished. No distress.   HENT:   Head: Normocephalic and atraumatic.   Eyes: Pupils are equal, round, and reactive to light. No scleral icterus.   Cardiovascular: Normal rate and regular rhythm.    Pulmonary/Chest: Effort normal. No respiratory distress.   Abdominal: Soft. He exhibits no distension. There is tenderness (appropriate incisional).   Soft, incision with dressing saturated with sanguinous drainage resembling old hematoma    Neurological: He is alert and oriented to person, place, and time.   Skin: Skin is warm and dry. No rash noted. He is not diaphoretic. No erythema.   Psychiatric: He has a normal mood and affect.       Significant Labs:  CBC:   Recent Labs  Lab 03/15/18  0554   WBC 5.95   RBC 2.91*   HGB 7.9*   HCT 24.9*   *   MCV 86   MCH 27.1   MCHC 31.7*     CMP:   Recent Labs  Lab 03/16/18 0512      CALCIUM 9.3   ALBUMIN 3.0*   PROT 6.1      K 4.1   CO2 27      BUN 20   CREATININE 0.8   ALKPHOS 52*   ALT 8*   AST 14   BILITOT 0.9     Coagulation:   Recent Labs  Lab 03/16/18 0512   LABPROT 10.8   INR 1.0   APTT 24.7  24.7       Significant  Diagnostics:  No new imaging    Assessment/Plan:     Ileum ulcer    S/p ex-lap with SBR on 3/13/18    Recommend starting patient back on clear liquid diet  Continue anticoagulation per primary team; will reassess wound later today and see if further intervention is needed to decrease drainage  Continue LVAD management per primary team    Will continue to follow along            Baljinder Uribe MD  General Surgery  [facility]     I have personally performed a detailed history and physical examination on this patient. My findings are summarized in the resident's note included in the record.

## 2018-03-17 NOTE — PROGRESS NOTES
LVAD dsg change done using sterile technique with soap and water by Ying GARCIAES 1 - Pink, healthy tissue incorporating into the driveline with little or no erythema, tenderness, or drainage; Monitoring.

## 2018-03-17 NOTE — SUBJECTIVE & OBJECTIVE
Interval History: POD #4. Slowly progressing diet. Oozing around surgical site. No acute complaints. Discussed low Hgb this AM. Will proceed with PRBC transfusion.     Continuous Infusions:   heparin (porcine) in 5 % dex 400 Units/hr (03/15/18 2138)     Scheduled Meds:   amLODIPine  5 mg Oral Daily    furosemide  20 mg Oral BID    lisinopril  5 mg Oral Daily    magnesium oxide  400 mg Oral BID    pantoprazole  40 mg Oral BID AC    potassium chloride  20 mEq Oral Daily    pravastatin  20 mg Oral QHS    sodium chloride 0.9%  10 mL Intravenous Q6H    warfarin  1 mg Oral Daily     PRN Meds:sodium chloride, acetaminophen, acetaminophen, baclofen, benzonatate, dextrose 50%, glucagon (human recombinant), hydrocodone-acetaminophen 5-325mg, ondansetron, ondansetron, polyethylene glycol, Flushing PICC Protocol **AND** sodium chloride 0.9% **AND** sodium chloride 0.9%, sodium chloride 0.9%    Review of patient's allergies indicates:   Allergen Reactions    Pneumococcal 23-killian ps vaccine      Objective:     Vital Signs (Most Recent):  Temp: 98.8 °F (37.1 °C) (03/17/18 0738)  Pulse: 84 (03/17/18 0738)  Resp: 16 (03/17/18 0738)  BP: (!) 76/0 (03/17/18 0738)  SpO2: 98 % (03/17/18 0738) Vital Signs (24h Range):  Temp:  [98.6 °F (37 °C)-99.7 °F (37.6 °C)] 98.8 °F (37.1 °C)  Pulse:  [] 84  Resp:  [16-19] 16  SpO2:  [97 %-100 %] 98 %  BP: (70-98)/(0-65) 76/0     Patient Vitals for the past 72 hrs (Last 3 readings):   Weight   03/17/18 0738 70.4 kg (155 lb 3.3 oz)   03/16/18 0746 73.7 kg (162 lb 7.7 oz)   03/15/18 0700 70.9 kg (156 lb 4.9 oz)     Body mass index is 22.92 kg/m².      Intake/Output Summary (Last 24 hours) at 03/17/18 1037  Last data filed at 03/17/18 0600   Gross per 24 hour   Intake             1020 ml   Output             1250 ml   Net             -230 ml        Physical Exam   Constitutional: He is oriented to person, place, and time. He appears well-developed and well-nourished.   HENT:   Head:  Normocephalic and atraumatic.   Eyes: Conjunctivae and EOM are normal. Pupils are equal, round, and reactive to light.   Neck: Normal range of motion. Neck supple. No JVD present. No thyromegaly present.   Cardiovascular:   RRR with frequent ectopi. Smooth VAD hum, intermittently pulsatile   Pulmonary/Chest: Effort normal and breath sounds normal.   Abdominal: Soft. Bowel sounds are normal.   Musculoskeletal: Normal range of motion. He exhibits no edema.   Neurological: He is alert and oriented to person, place, and time.   Skin: Skin is warm and dry. Capillary refill takes 2 to 3 seconds.   Psychiatric: He has a normal mood and affect. His behavior is normal. Judgment and thought content normal.       Significant Labs:  CBC:    Recent Labs  Lab 03/15/18  0554 03/16/18  0512 03/17/18  0606   WBC 5.95 6.54 5.27   RBC 2.91* 2.95* 2.50*   HGB 7.9* 7.9* 6.7*   HCT 24.9* 25.8* 21.7*   * 166 146*   MCV 86 88 87   MCH 27.1 26.8* 26.8*   MCHC 31.7* 30.6* 30.9*     BNP:    Recent Labs  Lab 03/12/18  0307 03/14/18  0330 03/16/18  0512   * 1,077* 699*     CMP:    Recent Labs  Lab 03/13/18  1426  03/14/18  0330 03/15/18  0554 03/16/18  0512 03/17/18  0606   *  < > 123* 107 102 83   CALCIUM 9.2  < > 9.1 9.3 9.3 8.6*   ALBUMIN 3.3*  --  3.1*  --  3.0*  --    PROT 6.2  --  6.2  --  6.1  --    *  < > 138 140 140 136   K 4.0  < > 4.2 4.2 4.1 3.9   CO2 23  < > 24 26 27 27     < > 106 106 106 103   BUN 25*  < > 19 21 20 20   CREATININE 1.1  < > 0.9 0.8 0.8 0.8   ALKPHOS 58  --  55  --  52*  --    ALT 13  --  12  --  8*  --    AST 20  --  20  --  14  --    BILITOT 0.9  --  1.3*  --  0.9  --    < > = values in this interval not displayed.     Coagulation:     Recent Labs  Lab 03/15/18  0554  03/16/18  0512  03/16/18  1753 03/16/18  2353 03/17/18  0606   INR 1.7*  --  1.0  --   --   --  1.1   APTT 39.1*  39.1*  < > 24.7  24.7  < > 25.3 24.4 23.9  23.9   < > = values in this interval not  displayed.    LDH:    Recent Labs  Lab 03/15/18  0554 03/16/18  0512 03/17/18  0606    154 131     I have reviewed all pertinent labs within the past 24 hours.    Estimated Creatinine Clearance: 89.2 mL/min (based on SCr of 0.8 mg/dL).    Diagnostic Results:  I have reviewed all pertinent imaging results/findings within the past 24 hours.

## 2018-03-17 NOTE — PLAN OF CARE
Problem: Patient Care Overview  Goal: Plan of Care Review  Patient progressing toward all goals. Plan of care discussed with patient, no questions at this time. Patient ambulating independently, fall precautions in place. Pt had a bowel movement and states he feels much better; Abdominal dressing changed, (center packed  With saline soaked gauze,  lightly embedded in open part of incision). Perimeter was cleaned with saline ; gauze soaked in saline was lightly packed and placed in the inner incision; ABD pad was placed; covered with tape. VS & LVAD numbers WNL. Will monitor.

## 2018-03-17 NOTE — ASSESSMENT & PLAN NOTE
- HM III placed on 7/2017 as DT with Speed @ 5200  - INR today 1.1. Continue low dose coumadin   - No asa. LDH stable.   - Continue heparin bridge(low dose fixed rate 400u/hr)  - MAP goal of 60-80. Pulsatile. Continue PTA regimen

## 2018-03-17 NOTE — PLAN OF CARE
Problem: Patient Care Overview  Goal: Plan of Care Review  Outcome: Ongoing (interventions implemented as appropriate)  Updated plan of care. Pt continue on heparin infusion, no issues w/ lvad- pt remained free from injury and no skin breakdown.  Sx following pt lvad dressing change by wife. Dressing to midline changed x 3 during shift.  Address all issues and concerns throughout shift.

## 2018-03-17 NOTE — PLAN OF CARE
Problem: Patient Care Overview  Goal: Plan of Care Review  Outcome: Ongoing (interventions implemented as appropriate)  Patient remains free of falls or injury. Patient denies pain. S/p exploratory lap and bowel resection on 3/13/18; stepped down to CSU on 3/14/18. MS incision with staples; packed and dressed per surgery on day shift. Continued on heparin drip; aPTTs q6 hours. Current INR 1.0. Plan of care reviewed with patient and wife.

## 2018-03-17 NOTE — PROGRESS NOTES
[facility]  Heart Transplant  Progress Note    Patient Name: Suman Hayden  MRN: [id number]  Admission Date: 3/5/2018  Hospital Length of Stay: 12 days  Attending Physician: Ortega Leos MD  Primary Care Provider: Joe Ernst MD  Principal Problem:Anemia    Subjective:     Interval History: POD #4. Slowly progressing diet. Oozing around surgical site. No acute complaints. Discussed low Hgb this AM. Will proceed with PRBC transfusion.     Continuous Infusions:   heparin (porcine) in 5 % dex 400 Units/hr (03/15/18 2138)     Scheduled Meds:   amLODIPine  5 mg Oral Daily    furosemide  20 mg Oral BID    lisinopril  5 mg Oral Daily    magnesium oxide  400 mg Oral BID    pantoprazole  40 mg Oral BID AC    potassium chloride  20 mEq Oral Daily    pravastatin  20 mg Oral QHS    sodium chloride 0.9%  10 mL Intravenous Q6H    warfarin  1 mg Oral Daily     PRN Meds:sodium chloride, acetaminophen, acetaminophen, baclofen, benzonatate, dextrose 50%, glucagon (human recombinant), hydrocodone-acetaminophen 5-325mg, ondansetron, ondansetron, polyethylene glycol, Flushing PICC Protocol **AND** sodium chloride 0.9% **AND** sodium chloride 0.9%, sodium chloride 0.9%    Review of patient's allergies indicates:   Allergen Reactions    Pneumococcal 23-killian ps vaccine      Objective:     Vital Signs (Most Recent):  Temp: 98.8 °F (37.1 °C) (03/17/18 0738)  Pulse: 84 (03/17/18 0738)  Resp: 16 (03/17/18 0738)  BP: (!) 76/0 (03/17/18 0738)  SpO2: 98 % (03/17/18 0738) Vital Signs (24h Range):  Temp:  [98.6 °F (37 °C)-99.7 °F (37.6 °C)] 98.8 °F (37.1 °C)  Pulse:  [] 84  Resp:  [16-19] 16  SpO2:  [97 %-100 %] 98 %  BP: (70-98)/(0-65) 76/0     Patient Vitals for the past 72 hrs (Last 3 readings):   Weight   03/17/18 0738 70.4 kg (155 lb 3.3 oz)   03/16/18 0746 73.7 kg (162 lb 7.7 oz)   03/15/18 0700 70.9 kg (156 lb 4.9 oz)     Body mass index is 22.92 kg/m².      Intake/Output Summary (Last 24 hours)  at 03/17/18 1037  Last data filed at 03/17/18 0600   Gross per 24 hour   Intake             1020 ml   Output             1250 ml   Net             -230 ml        Physical Exam   Constitutional: He is oriented to person, place, and time. He appears well-developed and well-nourished.   HENT:   Head: Normocephalic and atraumatic.   Eyes: Conjunctivae and EOM are normal. Pupils are equal, round, and reactive to light.   Neck: Normal range of motion. Neck supple. No JVD present. No thyromegaly present.   Cardiovascular:   RRR with frequent ectopi. Smooth VAD hum, intermittently pulsatile   Pulmonary/Chest: Effort normal and breath sounds normal.   Abdominal: Soft. Bowel sounds are normal.   Musculoskeletal: Normal range of motion. He exhibits no edema.   Neurological: He is alert and oriented to person, place, and time.   Skin: Skin is warm and dry. Capillary refill takes 2 to 3 seconds.   Psychiatric: He has a normal mood and affect. His behavior is normal. Judgment and thought content normal.       Significant Labs:  CBC:    Recent Labs  Lab 03/15/18  0554 03/16/18  0512 03/17/18  0606   WBC 5.95 6.54 5.27   RBC 2.91* 2.95* 2.50*   HGB 7.9* 7.9* 6.7*   HCT 24.9* 25.8* 21.7*   * 166 146*   MCV 86 88 87   MCH 27.1 26.8* 26.8*   MCHC 31.7* 30.6* 30.9*     BNP:    Recent Labs  Lab 03/12/18  0307 03/14/18  0330 03/16/18  0512   * 1,077* 699*     CMP:    Recent Labs  Lab 03/13/18  1426  03/14/18  0330 03/15/18  0554 03/16/18  0512 03/17/18  0606   *  < > 123* 107 102 83   CALCIUM 9.2  < > 9.1 9.3 9.3 8.6*   ALBUMIN 3.3*  --  3.1*  --  3.0*  --    PROT 6.2  --  6.2  --  6.1  --    *  < > 138 140 140 136   K 4.0  < > 4.2 4.2 4.1 3.9   CO2 23  < > 24 26 27 27     < > 106 106 106 103   BUN 25*  < > 19 21 20 20   CREATININE 1.1  < > 0.9 0.8 0.8 0.8   ALKPHOS 58  --  55  --  52*  --    ALT 13  --  12  --  8*  --    AST 20  --  20  --  14  --    BILITOT 0.9  --  1.3*  --  0.9  --    < > = values in  "this interval not displayed.     Coagulation:     Recent Labs  Lab 03/15/18  0554  03/16/18  0512  03/16/18  1753 03/16/18  2353 03/17/18  0606   INR 1.7*  --  1.0  --   --   --  1.1   APTT 39.1*  39.1*  < > 24.7  24.7  < > 25.3 24.4 23.9  23.9   < > = values in this interval not displayed.    LDH:    Recent Labs  Lab 03/15/18  0554 03/16/18  0512 03/17/18  0606    154 131     I have reviewed all pertinent labs within the past 24 hours.    Estimated Creatinine Clearance: 89.2 mL/min (based on SCr of 0.8 mg/dL).    Diagnostic Results:  I have reviewed all pertinent imaging results/findings within the past 24 hours.    Assessment and Plan:     No notes on file    * Anemia    - S/P Small Bowel resection 3/13/18.   - Continue oral iron replacement  - continue PPI BID push and monitor H/H closely  - continue heparin gtt(low dose) for LVAD  - Hgb 6.8 this AM will transfuse x1 unit of PRBC        LVAD (left ventricular assist device) present    - HM III placed on 7/2017 as DT with Speed @ 5200  - INR today 1.1. Continue low dose coumadin   - No asa. LDH stable.   - Continue heparin bridge(low dose fixed rate 400u/hr)  - MAP goal of 60-80. Pulsatile. Continue PTA regimen             Ileum ulcer    - Known NSAID-induced ileal ulcer  - GEN surgery following. POD #4 s/p ilieal resection   - Hgb trended down to 6.8 this AM (asymptomatic) but with oozing noted on dressing.   - Will give 1 unit of PRBC and re-evaluate tomorrow unless he becomes symptomatic throughout the day or oozing increases.   - Diet advanced per gen surg (full diet). Did not tolerate yesterday but patient hopefull he can keep food down today.         Hepatitis B core antibody positive since 2012    - Appreciate Hepatology's help: "Pt has evidence of immunity to HBV and no evidence of any viremia. Given above, pt does NOT require treatment with lamivudine if he were to undergo transplantation. He should follow up with hepatology clinic if he were to " "undergo transplant."  - Hep B viral DNA 3/8/18 < 10              Giovanni serrano, DO  Heart Transplant  [facility]  "

## 2018-03-17 NOTE — ASSESSMENT & PLAN NOTE
- S/P Small Bowel resection 3/13/18.   - Continue oral iron replacement  - continue PPI BID push and monitor H/H closely  - continue heparin gtt(low dose) for LVAD  - Hgb 6.8 this AM will transfuse x1 unit of PRBC

## 2018-03-17 NOTE — ASSESSMENT & PLAN NOTE
S/p ex-lap with SBR on 3/13/18    Will advance back to cardiac diet  Continue anticoagulation per primary team; will reassess wound later today and see if further intervention is needed to decrease drainage  Continue LVAD management per primary team    Will continue to follow along

## 2018-03-17 NOTE — ASSESSMENT & PLAN NOTE
- Known NSAID-induced ileal ulcer  - GEN surgery following. POD #4 s/p ilieal resection   - Hgb trended down to 6.8 this AM (asymptomatic) but with oozing noted on dressing.   - Will give 1 unit of PRBC and re-evaluate tomorrow unless he becomes symptomatic throughout the day or oozing increases.   - Diet advanced per gen surg (full diet). Did not tolerate yesterday but patient hopefull he can keep food down today.

## 2018-03-17 NOTE — SUBJECTIVE & OBJECTIVE
Interval History: had some more bleeding from skin incision, wound opened slightly and packed, tolerated clear liquids, no BM but is having flatus, Sat in chair well    Medications:  Continuous Infusions:   heparin (porcine) in 5 % dex 400 Units/hr (03/15/18 2138)     Scheduled Meds:   amLODIPine  5 mg Oral Daily    furosemide  20 mg Oral BID    lisinopril  5 mg Oral Daily    magnesium oxide  400 mg Oral BID    pantoprazole  40 mg Oral BID AC    potassium chloride  20 mEq Oral Daily    pravastatin  20 mg Oral QHS    sodium chloride 0.9%  10 mL Intravenous Q6H    warfarin  1 mg Oral Daily     PRN Meds:acetaminophen, acetaminophen, baclofen, benzonatate, dextrose 50%, glucagon (human recombinant), hydrocodone-acetaminophen 5-325mg, ondansetron, ondansetron, polyethylene glycol, Flushing PICC Protocol **AND** sodium chloride 0.9% **AND** sodium chloride 0.9%, sodium chloride 0.9%     Review of patient's allergies indicates:   Allergen Reactions    Pneumococcal 23-killian ps vaccine      Objective:     Vital Signs (Most Recent):  Temp: 98.8 °F (37.1 °C) (03/17/18 0738)  Pulse: 84 (03/17/18 0738)  Resp: 16 (03/17/18 0738)  BP: (!) 86/0 (03/17/18 0438)  SpO2: 98 % (03/17/18 0738) Vital Signs (24h Range):  Temp:  [98.6 °F (37 °C)-99.7 °F (37.6 °C)] 98.8 °F (37.1 °C)  Pulse:  [] 84  Resp:  [16-19] 16  SpO2:  [97 %-100 %] 98 %  BP: (70-98)/(0-65) 86/0     Weight: 73.7 kg (162 lb 7.7 oz)  Body mass index is 23.99 kg/m².    Intake/Output - Last 3 Shifts       03/15 0700 - 03/16 0659 03/16 0700 - 03/17 0659 03/17 0700 - 03/18 0659    P.O. 540 1260     I.V. (mL/kg)       Total Intake(mL/kg) 540 (7.6) 1260 (17.1)     Urine (mL/kg/hr) 1100 (0.6) 1550 (0.9)     Stool 0 (0) 0 (0)     Total Output 1100 1550      Net -560 -290             Urine Occurrence  1 x     Stool Occurrence 0 x 0 x           Physical Exam   Constitutional: He is oriented to person, place, and time. He appears well-developed and well-nourished. No  distress.   HENT:   Head: Normocephalic and atraumatic.   Eyes: Conjunctivae are normal. Right eye exhibits no discharge. Left eye exhibits no discharge. No scleral icterus.   Neck: Normal range of motion. Neck supple. No JVD present. No tracheal deviation present.   Cardiovascular: Normal rate and regular rhythm.    Pulmonary/Chest: Effort normal. No respiratory distress.   Abdominal: Soft. He exhibits no distension. There is no tenderness.   Old blood from incision, repacked at bedside   Neurological: He is alert and oriented to person, place, and time.   Skin: Skin is warm and dry. No rash noted. He is not diaphoretic. No erythema.       Significant Labs:  CBC:   Recent Labs  Lab 03/17/18  0606   WBC 5.27   RBC 2.50*   HGB 6.7*   HCT 21.7*   *   MCV 87   MCH 26.8*   MCHC 30.9*     CMP:   Recent Labs  Lab 03/16/18  0512 03/17/18  0606    83   CALCIUM 9.3 8.6*   ALBUMIN 3.0*  --    PROT 6.1  --     136   K 4.1 3.9   CO2 27 27    103   BUN 20 20   CREATININE 0.8 0.8   ALKPHOS 52*  --    ALT 8*  --    AST 14  --    BILITOT 0.9  --        Significant Diagnostics:  I have reviewed and interpreted all pertinent imaging results/findings within the past 24 hours.

## 2018-03-17 NOTE — PROGRESS NOTES
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 Post-op course complicated by Gi bleeding had double balloon enteropscopy in 12/2017 (bleeding ulcer)  and 1/25/2018 no bleeding but ulcer found.  Retrograde DBE on 1/26/18 which was negative. He had done well for about a month with no further episodes of bleeding, and no dark stools. Then this past week he had is labs checked and was found to be anemic again. He was admitted to the hospital and did pass some melanic stool. He had a repeat upper endoscopy where several previously known about polyps were found but none actively bleeding.     Post-Op Info:  Procedure(s) (LRB):  EXPLORATORY-LAPAROTOMY with small bowel resection  (N/A)  RESECTION-BOWEL   4 Days Post-Op     Interval History: had some more bleeding from skin incision, wound opened slightly and packed, tolerated clear liquids, no BM but is having flatus, Sat in chair well    Medications:  Continuous Infusions:   heparin (porcine) in 5 % dex 400 Units/hr (03/15/18 2930)     Scheduled Meds:   amLODIPine  5 mg Oral Daily    furosemide  20 mg Oral BID    lisinopril  5 mg Oral Daily    magnesium oxide  400 mg Oral BID    pantoprazole  40 mg Oral BID AC    potassium chloride  20 mEq Oral Daily    pravastatin  20 mg Oral QHS    sodium chloride 0.9%  10 mL Intravenous Q6H    warfarin  1 mg Oral Daily     PRN Meds:acetaminophen, acetaminophen, baclofen, benzonatate, dextrose 50%, glucagon (human recombinant), hydrocodone-acetaminophen 5-325mg, ondansetron, ondansetron, polyethylene glycol, Flushing PICC Protocol **AND** sodium chloride 0.9% **AND** sodium chloride 0.9%, sodium chloride 0.9%     Review of patient's allergies indicates:   Allergen Reactions    Pneumococcal 23-killian ps vaccine      Objective:     Vital Signs (Most Recent):  Temp: 98.8 °F (37.1 °C) (03/17/18 0738)  Pulse: 84  (03/17/18 0738)  Resp: 16 (03/17/18 0738)  BP: (!) 86/0 (03/17/18 0438)  SpO2: 98 % (03/17/18 0738) Vital Signs (24h Range):  Temp:  [98.6 °F (37 °C)-99.7 °F (37.6 °C)] 98.8 °F (37.1 °C)  Pulse:  [] 84  Resp:  [16-19] 16  SpO2:  [97 %-100 %] 98 %  BP: (70-98)/(0-65) 86/0     Weight: 73.7 kg (162 lb 7.7 oz)  Body mass index is 23.99 kg/m².    Intake/Output - Last 3 Shifts       03/15 0700 - 03/16 0659 03/16 0700 - 03/17 0659 03/17 0700 - 03/18 0659    P.O. 540 1260     I.V. (mL/kg)       Total Intake(mL/kg) 540 (7.6) 1260 (17.1)     Urine (mL/kg/hr) 1100 (0.6) 1550 (0.9)     Stool 0 (0) 0 (0)     Total Output 1100 1550      Net -560 -290             Urine Occurrence  1 x     Stool Occurrence 0 x 0 x           Physical Exam   Constitutional: He is oriented to person, place, and time. He appears well-developed and well-nourished. No distress.   HENT:   Head: Normocephalic and atraumatic.   Eyes: Conjunctivae are normal. Right eye exhibits no discharge. Left eye exhibits no discharge. No scleral icterus.   Neck: Normal range of motion. Neck supple. No JVD present. No tracheal deviation present.   Cardiovascular: Normal rate and regular rhythm.    Pulmonary/Chest: Effort normal. No respiratory distress.   Abdominal: Soft. He exhibits no distension. There is no tenderness.   Old blood from incision, repacked at bedside   Neurological: He is alert and oriented to person, place, and time.   Skin: Skin is warm and dry. No rash noted. He is not diaphoretic. No erythema.       Significant Labs:  CBC:   Recent Labs  Lab 03/17/18  0606   WBC 5.27   RBC 2.50*   HGB 6.7*   HCT 21.7*   *   MCV 87   MCH 26.8*   MCHC 30.9*     CMP:   Recent Labs  Lab 03/16/18  0512 03/17/18  0606    83   CALCIUM 9.3 8.6*   ALBUMIN 3.0*  --    PROT 6.1  --     136   K 4.1 3.9   CO2 27 27    103   BUN 20 20   CREATININE 0.8 0.8   ALKPHOS 52*  --    ALT 8*  --    AST 14  --    BILITOT 0.9  --        Significant  Diagnostics:  I have reviewed and interpreted all pertinent imaging results/findings within the past 24 hours.    Assessment/Plan:     Ileum ulcer    S/p ex-lap with SBR on 3/13/18    Will advance back to cardiac diet  Continue anticoagulation per primary team; will reassess wound later today and see if further intervention is needed to decrease drainage  Continue LVAD management per primary team    Will continue to follow along            Neil Alexis MD  General Surgery  [facility]

## 2018-03-18 NOTE — PROGRESS NOTES
Dr. Alonso notified that occult stool is positive. No orders given at this time. Will continue to monitor.

## 2018-03-18 NOTE — ASSESSMENT & PLAN NOTE
- HM III placed on 7/2017 as DT with Speed @ 5200  - INR today 1.1. Continue low dose coumadin   - No asa. LDH stable.   - Continue subtherapeutic heparin bridge (low dose fixed rate 400u/hr)  - MAP goal of 60-80. Pulsatile. Continue PTA regimen

## 2018-03-18 NOTE — PROGRESS NOTES
Patient family member notified RN with photo of dark, seemingly bloody BM. States patient had two similar BMs in short period of time. Dr. Alonso notified; order for occult stool placed. No further orders given at this time. Will continue to monitor.

## 2018-03-18 NOTE — PROGRESS NOTES
[facility]  Heart Transplant  Progress Note    Patient Name: Suman Hayden  MRN: [id number]  Admission Date: 3/5/2018  Hospital Length of Stay: 13 days  Attending Physician: Ortega Leos MD  Primary Care Provider: Joe Ernst MD  Principal Problem:Anemia    Subjective:     Interval History: POD #5. Progressed to regular diet with multiple BMs ON. Hgb dropped this AM despite slower oozing from abdominal incision.     Continuous Infusions:   heparin (porcine) in 5 % dex 400 Units/hr (03/18/18 0520)     Scheduled Meds:   amLODIPine  5 mg Oral Daily    dronabinol  5 mg Oral TID    furosemide  20 mg Oral BID    lisinopril  5 mg Oral Daily    magnesium oxide  400 mg Oral BID    pantoprazole  40 mg Oral BID AC    potassium chloride  20 mEq Oral Daily    pravastatin  20 mg Oral QHS    sodium chloride 0.9%  10 mL Intravenous Q6H    warfarin  1 mg Oral Daily     PRN Meds:sodium chloride, sodium chloride, acetaminophen, acetaminophen, baclofen, benzonatate, dextrose 50%, glucagon (human recombinant), hydrocodone-acetaminophen 5-325mg, ondansetron, ondansetron, polyethylene glycol, Flushing PICC Protocol **AND** sodium chloride 0.9% **AND** sodium chloride 0.9%, sodium chloride 0.9%    Review of patient's allergies indicates:   Allergen Reactions    Pneumococcal 23-killian ps vaccine      Objective:     Vital Signs (Most Recent):  Temp: 99.7 °F (37.6 °C) (03/18/18 1040)  Pulse: 96 (03/18/18 1040)  Resp: 18 (03/18/18 1040)  BP: (!) 72/0 (03/18/18 1216)  SpO2: 100 % (03/18/18 1040) Vital Signs (24h Range):  Temp:  [97.5 °F (36.4 °C)-99.7 °F (37.6 °C)] 99.7 °F (37.6 °C)  Pulse:  [47-96] 96  Resp:  [16-18] 18  SpO2:  [96 %-100 %] 100 %  BP: (68-98)/(0-69) 72/0     Patient Vitals for the past 72 hrs (Last 3 readings):   Weight   03/18/18 0834 70.4 kg (155 lb 3.3 oz)   03/17/18 0738 70.4 kg (155 lb 3.3 oz)   03/16/18 0746 73.7 kg (162 lb 7.7 oz)     Body mass index is 22.92  kg/m².      Intake/Output Summary (Last 24 hours) at 03/18/18 1232  Last data filed at 03/18/18 0520   Gross per 24 hour   Intake              420 ml   Output             1425 ml   Net            -1005 ml        Physical Exam   Constitutional: He is oriented to person, place, and time. He appears well-developed and well-nourished.   HENT:   Head: Normocephalic and atraumatic.   Eyes: Conjunctivae and EOM are normal. Pupils are equal, round, and reactive to light.   Neck: Normal range of motion. Neck supple. No JVD present. No thyromegaly present.   Cardiovascular:   RRR with frequent ectopi. Smooth VAD hum, intermittently pulsatile   Pulmonary/Chest: Effort normal and breath sounds normal.   Abdominal: Soft. Bowel sounds are normal.   Musculoskeletal: Normal range of motion. He exhibits no edema.   Neurological: He is alert and oriented to person, place, and time.   Skin: Skin is warm and dry. Capillary refill takes 2 to 3 seconds.   Psychiatric: He has a normal mood and affect. His behavior is normal. Judgment and thought content normal.     Significant Labs:  CBC:    Recent Labs  Lab 03/16/18  0512 03/17/18  0606 03/18/18  0537   WBC 6.54 5.27 6.45   RBC 2.95* 2.50* 2.32*   HGB 7.9* 6.7* 6.3*   HCT 25.8* 21.7* 19.8*    146* 143*   MCV 88 87 85   MCH 26.8* 26.8* 27.2   MCHC 30.6* 30.9* 31.8*     BNP:    Recent Labs  Lab 03/12/18  0307 03/14/18  0330 03/16/18  0512   * 1,077* 699*     CMP:    Recent Labs  Lab 03/13/18  1426  03/14/18  0330  03/16/18  0512 03/17/18  0606 03/18/18  0537   *  < > 123*  < > 102 83 93   CALCIUM 9.2  < > 9.1  < > 9.3 8.6* 8.7   ALBUMIN 3.3*  --  3.1*  --  3.0*  --   --    PROT 6.2  --  6.2  --  6.1  --   --    *  < > 138  < > 140 136 135*   K 4.0  < > 4.2  < > 4.1 3.9 4.0   CO2 23  < > 24  < > 27 27 27     < > 106  < > 106 103 102   BUN 25*  < > 19  < > 20 20 26*   CREATININE 1.1  < > 0.9  < > 0.8 0.8 0.8   ALKPHOS 58  --  55  --  52*  --   --    ALT 13   "--  12  --  8*  --   --    AST 20  --  20  --  14  --   --    BILITOT 0.9  --  1.3*  --  0.9  --   --    < > = values in this interval not displayed.     Coagulation:     Recent Labs  Lab 03/16/18  0512  03/17/18  0606  03/17/18  1747 03/18/18  0022 03/18/18  0537   INR 1.0  --  1.1  --   --   --  1.1   APTT 24.7  24.7  < > 23.9  23.9  < > 29.9 24.0 23.8  23.8   < > = values in this interval not displayed.    LDH:    Recent Labs  Lab 03/16/18 0512 03/17/18  0606 03/18/18  0537    131 123     I have reviewed all pertinent labs within the past 24 hours.    Estimated Creatinine Clearance: 89.2 mL/min (based on SCr of 0.8 mg/dL).    Diagnostic Results:  I have reviewed all pertinent imaging results/findings within the past 24 hours.    Assessment and Plan:     No notes on file    * Anemia    - S/P Small Bowel resection 3/13/18.   - Continue oral iron replacement  - continue PPI BID push and monitor H/H closely  - continue heparin gtt(low dose) for LVAD  - PRBCs (as above)         LVAD (left ventricular assist device) present    - HM III placed on 7/2017 as DT with Speed @ 5200  - INR today 1.1. Continue low dose coumadin   - No asa. LDH stable.   - Continue subtherapeutic heparin bridge (low dose fixed rate 400u/hr)  - MAP goal of 60-80. Pulsatile. Continue PTA regimen             Ileum ulcer    - Known NSAID-induced ileal ulcer  - GEN surgery following. POD #5 s/p ilieal resection   - Hgb trended down to 6.3 this AM (asymptomatic)   - 2 additional units of PRBCs this AM. Will recheck CBC in the AM unless clinical situation worsens.   - Diet advanced per gen surg (full diet). Did not tolerate yesterday but patient hopefull he can keep food down today.         Hepatitis B core antibody positive since 2012    - Appreciate Hepatology's help: "Pt has evidence of immunity to HBV and no evidence of any viremia. Given above, pt does NOT require treatment with lamivudine if he were to undergo transplantation. He " "should follow up with hepatology clinic if he were to undergo transplant."  - Hep B viral DNA 3/8/18 < 10              Giovanni serrano, DO  Heart Transplant  [facility]  "

## 2018-03-18 NOTE — ASSESSMENT & PLAN NOTE
- Known NSAID-induced ileal ulcer  - GEN surgery following. POD #5 s/p ilieal resection   - Hgb trended down to 6.3 this AM (asymptomatic)   - 2 additional units of PRBCs this AM. Will recheck CBC in the AM unless clinical situation worsens.   - Diet advanced per gen surg (full diet). Did not tolerate yesterday but patient hopefull he can keep food down today.

## 2018-03-18 NOTE — SUBJECTIVE & OBJECTIVE
Interval History: feeling better, did have bowel movements, is eating regular diet, is ambulating in [place]    Medications:  Continuous Infusions:   heparin (porcine) in 5 % dex 400 Units/hr (03/18/18 0520)     Scheduled Meds:   amLODIPine  5 mg Oral Daily    furosemide  20 mg Oral BID    lisinopril  5 mg Oral Daily    magnesium oxide  400 mg Oral BID    pantoprazole  40 mg Oral BID AC    potassium chloride  20 mEq Oral Daily    pravastatin  20 mg Oral QHS    sodium chloride 0.9%  10 mL Intravenous Q6H    warfarin  1 mg Oral Daily     PRN Meds:sodium chloride, sodium chloride, acetaminophen, acetaminophen, baclofen, benzonatate, dextrose 50%, glucagon (human recombinant), hydrocodone-acetaminophen 5-325mg, ondansetron, ondansetron, polyethylene glycol, Flushing PICC Protocol **AND** sodium chloride 0.9% **AND** sodium chloride 0.9%, sodium chloride 0.9%     Review of patient's allergies indicates:   Allergen Reactions    Pneumococcal 23-killian ps vaccine      Objective:     Vital Signs (Most Recent):  Temp: 98.8 °F (37.1 °C) (03/18/18 0834)  Pulse: 85 (03/18/18 0834)  Resp: 16 (03/18/18 0834)  BP: (!) 98/59 (03/18/18 0834)  SpO2: 97 % (03/18/18 0834) Vital Signs (24h Range):  Temp:  [97.3 °F (36.3 °C)-99.1 °F (37.3 °C)] 98.8 °F (37.1 °C)  Pulse:  [47-93] 85  Resp:  [16-18] 16  SpO2:  [96 %-99 %] 97 %  BP: (68-98)/(0-69) 98/59     Weight: 70.4 kg (155 lb 3.3 oz)  Body mass index is 22.92 kg/m².    Intake/Output - Last 3 Shifts       03/16 0700 - 03/17 0659 03/17 0700 - 03/18 0659 03/18 0700 - 03/19 0659    P.O. 1260 660     Total Intake(mL/kg) 1260 (17.1) 660 (9.4)     Urine (mL/kg/hr) 1550 (0.9) 1650 (1)     Stool 0 (0) 0 (0)     Total Output 1550 1650      Net -290 -990             Urine Occurrence 1 x      Stool Occurrence 0 x 3 x           Physical Exam   Constitutional: He is oriented to person, place, and time. He appears well-developed and well-nourished. No distress.   HENT:   Head: Normocephalic and  atraumatic.   Eyes: Conjunctivae are normal. Right eye exhibits no discharge. Left eye exhibits no discharge. No scleral icterus.   Neck: Normal range of motion. Neck supple. No JVD present. No tracheal deviation present.   Cardiovascular: Normal rate and regular rhythm.    Pulmonary/Chest: Effort normal. No respiratory distress.   Abdominal: Soft. He exhibits no distension. There is no tenderness.   Wound open and packed, no active bleeding when packing removed   Neurological: He is alert and oriented to person, place, and time.   Skin: Skin is warm and dry. No rash noted. He is not diaphoretic. No erythema.       Significant Labs:  CBC:   Recent Labs  Lab 03/18/18  0537   WBC 6.45   RBC 2.32*   HGB 6.3*   HCT 19.8*   *   MCV 85   MCH 27.2   MCHC 31.8*     BMP:   Recent Labs  Lab 03/18/18  0537   GLU 93   *   K 4.0      CO2 27   BUN 26*   CREATININE 0.8   CALCIUM 8.7   MG 1.9       Significant Diagnostics:  I have reviewed and interpreted all pertinent imaging results/findings within the past 24 hours.

## 2018-03-18 NOTE — PROGRESS NOTES
Dr. Alonso notified of patient's H&H 6.3/19.8; no orders given at this time. Will continue to monitor.

## 2018-03-18 NOTE — PROGRESS NOTES
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 Post-op course complicated by Gi bleeding had double balloon enteropscopy in 12/2017 (bleeding ulcer)  and 1/25/2018 no bleeding but ulcer found.  Retrograde DBE on 1/26/18 which was negative. He had done well for about a month with no further episodes of bleeding, and no dark stools. Then this past week he had is labs checked and was found to be anemic again. He was admitted to the hospital and did pass some melanic stool. He had a repeat upper endoscopy where several previously known about polyps were found but none actively bleeding.     Post-Op Info:  Procedure(s) (LRB):  EXPLORATORY-LAPAROTOMY with small bowel resection  (N/A)  RESECTION-BOWEL   5 Days Post-Op     Interval History: feeling better, did have bowel movements, is eating regular diet, is ambulating in [place]    Medications:  Continuous Infusions:   heparin (porcine) in 5 % dex 400 Units/hr (03/18/18 0520)     Scheduled Meds:   amLODIPine  5 mg Oral Daily    furosemide  20 mg Oral BID    lisinopril  5 mg Oral Daily    magnesium oxide  400 mg Oral BID    pantoprazole  40 mg Oral BID AC    potassium chloride  20 mEq Oral Daily    pravastatin  20 mg Oral QHS    sodium chloride 0.9%  10 mL Intravenous Q6H    warfarin  1 mg Oral Daily     PRN Meds:sodium chloride, sodium chloride, acetaminophen, acetaminophen, baclofen, benzonatate, dextrose 50%, glucagon (human recombinant), hydrocodone-acetaminophen 5-325mg, ondansetron, ondansetron, polyethylene glycol, Flushing PICC Protocol **AND** sodium chloride 0.9% **AND** sodium chloride 0.9%, sodium chloride 0.9%     Review of patient's allergies indicates:   Allergen Reactions    Pneumococcal 23-killian ps vaccine      Objective:     Vital Signs (Most Recent):  Temp: 98.8 °F (37.1 °C) (03/18/18 0834)  Pulse: 85 (03/18/18 0834)  Resp: 16  (03/18/18 0834)  BP: (!) 98/59 (03/18/18 0834)  SpO2: 97 % (03/18/18 0834) Vital Signs (24h Range):  Temp:  [97.3 °F (36.3 °C)-99.1 °F (37.3 °C)] 98.8 °F (37.1 °C)  Pulse:  [47-93] 85  Resp:  [16-18] 16  SpO2:  [96 %-99 %] 97 %  BP: (68-98)/(0-69) 98/59     Weight: 70.4 kg (155 lb 3.3 oz)  Body mass index is 22.92 kg/m².    Intake/Output - Last 3 Shifts       03/16 0700 - 03/17 0659 03/17 0700 - 03/18 0659 03/18 0700 - 03/19 0659    P.O. 1260 660     Total Intake(mL/kg) 1260 (17.1) 660 (9.4)     Urine (mL/kg/hr) 1550 (0.9) 1650 (1)     Stool 0 (0) 0 (0)     Total Output 1550 1650      Net -290 -990             Urine Occurrence 1 x      Stool Occurrence 0 x 3 x           Physical Exam   Constitutional: He is oriented to person, place, and time. He appears well-developed and well-nourished. No distress.   HENT:   Head: Normocephalic and atraumatic.   Eyes: Conjunctivae are normal. Right eye exhibits no discharge. Left eye exhibits no discharge. No scleral icterus.   Neck: Normal range of motion. Neck supple. No JVD present. No tracheal deviation present.   Cardiovascular: Normal rate and regular rhythm.    Pulmonary/Chest: Effort normal. No respiratory distress.   Abdominal: Soft. He exhibits no distension. There is no tenderness.   Wound open and packed, no active bleeding when packing removed   Neurological: He is alert and oriented to person, place, and time.   Skin: Skin is warm and dry. No rash noted. He is not diaphoretic. No erythema.       Significant Labs:  CBC:   Recent Labs  Lab 03/18/18  0537   WBC 6.45   RBC 2.32*   HGB 6.3*   HCT 19.8*   *   MCV 85   MCH 27.2   MCHC 31.8*     BMP:   Recent Labs  Lab 03/18/18  0537   GLU 93   *   K 4.0      CO2 27   BUN 26*   CREATININE 0.8   CALCIUM 8.7   MG 1.9       Significant Diagnostics:  I have reviewed and interpreted all pertinent imaging results/findings within the past 24 hours.    Assessment/Plan:     Ileum ulcer    S/p ex-lap with SBR on  3/13/18    continue cardiac diet  Continue anticoagulation per primary team; no active bleeding today, wound repacked  transfuse as neccessary  Continue LVAD management per primary team    Will continue to follow along            Neil Alexis MD  General Surgery  [facility]

## 2018-03-18 NOTE — ASSESSMENT & PLAN NOTE
- S/P Small Bowel resection 3/13/18.   - Continue oral iron replacement  - continue PPI BID push and monitor H/H closely  - continue heparin gtt(low dose) for LVAD  - PRBCs (as above)

## 2018-03-18 NOTE — ASSESSMENT & PLAN NOTE
S/p ex-lap with SBR on 3/13/18    continue cardiac diet  Continue anticoagulation per primary team; no active bleeding today, wound repacked  transfuse as neccessary  Continue LVAD management per primary team    Will continue to follow along

## 2018-03-18 NOTE — SUBJECTIVE & OBJECTIVE
Interval History: POD #5. Progressed to regular diet with multiple BMs ON. Hgb dropped this AM despite slower oozing from abdominal incision.     Continuous Infusions:   heparin (porcine) in 5 % dex 400 Units/hr (03/18/18 0520)     Scheduled Meds:   amLODIPine  5 mg Oral Daily    dronabinol  5 mg Oral TID    furosemide  20 mg Oral BID    lisinopril  5 mg Oral Daily    magnesium oxide  400 mg Oral BID    pantoprazole  40 mg Oral BID AC    potassium chloride  20 mEq Oral Daily    pravastatin  20 mg Oral QHS    sodium chloride 0.9%  10 mL Intravenous Q6H    warfarin  1 mg Oral Daily     PRN Meds:sodium chloride, sodium chloride, acetaminophen, acetaminophen, baclofen, benzonatate, dextrose 50%, glucagon (human recombinant), hydrocodone-acetaminophen 5-325mg, ondansetron, ondansetron, polyethylene glycol, Flushing PICC Protocol **AND** sodium chloride 0.9% **AND** sodium chloride 0.9%, sodium chloride 0.9%    Review of patient's allergies indicates:   Allergen Reactions    Pneumococcal 23-killian ps vaccine      Objective:     Vital Signs (Most Recent):  Temp: 99.7 °F (37.6 °C) (03/18/18 1040)  Pulse: 96 (03/18/18 1040)  Resp: 18 (03/18/18 1040)  BP: (!) 72/0 (03/18/18 1216)  SpO2: 100 % (03/18/18 1040) Vital Signs (24h Range):  Temp:  [97.5 °F (36.4 °C)-99.7 °F (37.6 °C)] 99.7 °F (37.6 °C)  Pulse:  [47-96] 96  Resp:  [16-18] 18  SpO2:  [96 %-100 %] 100 %  BP: (68-98)/(0-69) 72/0     Patient Vitals for the past 72 hrs (Last 3 readings):   Weight   03/18/18 0834 70.4 kg (155 lb 3.3 oz)   03/17/18 0738 70.4 kg (155 lb 3.3 oz)   03/16/18 0746 73.7 kg (162 lb 7.7 oz)     Body mass index is 22.92 kg/m².      Intake/Output Summary (Last 24 hours) at 03/18/18 1232  Last data filed at 03/18/18 0520   Gross per 24 hour   Intake              420 ml   Output             1425 ml   Net            -1005 ml        Physical Exam   Constitutional: He is oriented to person, place, and time. He appears well-developed and  well-nourished.   HENT:   Head: Normocephalic and atraumatic.   Eyes: Conjunctivae and EOM are normal. Pupils are equal, round, and reactive to light.   Neck: Normal range of motion. Neck supple. No JVD present. No thyromegaly present.   Cardiovascular:   RRR with frequent ectopi. Smooth VAD hum, intermittently pulsatile   Pulmonary/Chest: Effort normal and breath sounds normal.   Abdominal: Soft. Bowel sounds are normal.   Musculoskeletal: Normal range of motion. He exhibits no edema.   Neurological: He is alert and oriented to person, place, and time.   Skin: Skin is warm and dry. Capillary refill takes 2 to 3 seconds.   Psychiatric: He has a normal mood and affect. His behavior is normal. Judgment and thought content normal.     Significant Labs:  CBC:    Recent Labs  Lab 03/16/18  0512 03/17/18  0606 03/18/18  0537   WBC 6.54 5.27 6.45   RBC 2.95* 2.50* 2.32*   HGB 7.9* 6.7* 6.3*   HCT 25.8* 21.7* 19.8*    146* 143*   MCV 88 87 85   MCH 26.8* 26.8* 27.2   MCHC 30.6* 30.9* 31.8*     BNP:    Recent Labs  Lab 03/12/18  0307 03/14/18  0330 03/16/18  0512   * 1,077* 699*     CMP:    Recent Labs  Lab 03/13/18  1426  03/14/18  0330  03/16/18  0512 03/17/18  0606 03/18/18  0537   *  < > 123*  < > 102 83 93   CALCIUM 9.2  < > 9.1  < > 9.3 8.6* 8.7   ALBUMIN 3.3*  --  3.1*  --  3.0*  --   --    PROT 6.2  --  6.2  --  6.1  --   --    *  < > 138  < > 140 136 135*   K 4.0  < > 4.2  < > 4.1 3.9 4.0   CO2 23  < > 24  < > 27 27 27     < > 106  < > 106 103 102   BUN 25*  < > 19  < > 20 20 26*   CREATININE 1.1  < > 0.9  < > 0.8 0.8 0.8   ALKPHOS 58  --  55  --  52*  --   --    ALT 13  --  12  --  8*  --   --    AST 20  --  20  --  14  --   --    BILITOT 0.9  --  1.3*  --  0.9  --   --    < > = values in this interval not displayed.     Coagulation:     Recent Labs  Lab 03/16/18  0512  03/17/18  0606  03/17/18  1747 03/18/18  0022 03/18/18  0537   INR 1.0  --  1.1  --   --   --  1.1   APTT 24.7   24.7  < > 23.9  23.9  < > 29.9 24.0 23.8  23.8   < > = values in this interval not displayed.    LDH:    Recent Labs  Lab 03/16/18  0512 03/17/18  0606 03/18/18  0537    131 123     I have reviewed all pertinent labs within the past 24 hours.    Estimated Creatinine Clearance: 89.2 mL/min (based on SCr of 0.8 mg/dL).    Diagnostic Results:  I have reviewed all pertinent imaging results/findings within the past 24 hours.

## 2018-03-18 NOTE — PLAN OF CARE
Problem: Patient Care Overview  Goal: Plan of Care Review  Outcome: Ongoing (interventions implemented as appropriate)  Patient remains free of falls or injury. Patient denies pain. S/p exploratory lap and bowel resection on 3/13/18; stepped down to CSU on 3/14/18. MS incision with staples; packed and dressed per surgery. Continued on heparin drip; aPTTs q6 hours. Current INR 1.1. Occult stool sample (+). Plan of care reviewed with patient and wife.

## 2018-03-19 NOTE — PROGRESS NOTES
[facility]  Heart Transplant  Progress Note    Patient Name: Suman Hayden  MRN: [id number]  Admission Date: 3/5/2018  Hospital Length of Stay: 14 days  Attending Physician: Ortega Leos MD  Primary Care Provider: Joe Ernst MD  Principal Problem:Anemia    Subjective:     Interval History:    Sited on chair this morning. No acute overnight event. Bleeding from surgical site decreased significantly. Pain is also adequately controlled. Surgery following daily with dressing change.     Continuous Infusions:   heparin (porcine) in 5 % dex 400 Units/hr (03/18/18 0520)     Scheduled Meds:   amLODIPine  5 mg Oral Daily    dronabinol  5 mg Oral TID    furosemide  20 mg Oral BID    lisinopril  5 mg Oral Daily    magnesium oxide  400 mg Oral BID    pantoprazole  40 mg Oral BID AC    potassium chloride  20 mEq Oral Daily    pravastatin  20 mg Oral QHS    sodium chloride 0.9%  10 mL Intravenous Q6H    warfarin  2.5 mg Oral Once     PRN Meds:sodium chloride, sodium chloride, acetaminophen, acetaminophen, baclofen, benzonatate, dextrose 50%, glucagon (human recombinant), hydrocodone-acetaminophen 5-325mg, ondansetron, ondansetron, polyethylene glycol, Flushing PICC Protocol **AND** sodium chloride 0.9% **AND** sodium chloride 0.9%, sodium chloride 0.9%    Review of patient's allergies indicates:   Allergen Reactions    Pneumococcal 23-killian ps vaccine      Objective:     Vital Signs (Most Recent):  Temp: 99.7 °F (37.6 °C) (03/19/18 1215)  Pulse: 82 (03/19/18 1215)  Resp: 18 (03/19/18 1215)  BP: (!) 78/0 (03/19/18 0800)  SpO2: (!) 92 % (03/19/18 1215) Vital Signs (24h Range):  Temp:  [98.2 °F (36.8 °C)-99.7 °F (37.6 °C)] 99.7 °F (37.6 °C)  Pulse:  [75-84] 82  Resp:  [15-18] 18  SpO2:  [92 %-99 %] 92 %  BP: (60-83)/(0-52) 78/0     Patient Vitals for the past 72 hrs (Last 3 readings):   Weight   03/19/18 0700 68.2 kg (150 lb 5.7 oz)   03/18/18 0834 70.4 kg (155 lb 3.3 oz)   03/17/18 0738 70.4  kg (155 lb 3.3 oz)     Body mass index is 22.2 kg/m².      Intake/Output Summary (Last 24 hours) at 03/19/18 1227  Last data filed at 03/19/18 0600   Gross per 24 hour   Intake              840 ml   Output             2075 ml   Net            -1235 ml       Hemodynamic Parameters:       Telemetry: reviewed and no event noted    Physical Exam   Constitutional: He is oriented to person, place, and time.   HENT:   Temporal wasting   Neck: No JVD present.   Cardiovascular:   LVAD hum- smooth   Pulmonary/Chest: Effort normal and breath sounds normal.   Abdominal: Soft. Bowel sounds are normal.   Mid lower abd quad wound covered with clean dry dressing   Musculoskeletal: He exhibits no edema or tenderness.   Neurological: He is alert and oriented to person, place, and time.   Skin: Skin is warm and dry.   Nursing note and vitals reviewed.        Significant Labs:  CBC:    Recent Labs  Lab 03/17/18  0606 03/18/18  0537 03/19/18  0527   WBC 5.27 6.45 6.45   RBC 2.50* 2.32* 2.94*   HGB 6.7* 6.3* 7.7*   HCT 21.7* 19.8* 23.8*   * 143* 134*   MCV 87 85 81*   MCH 26.8* 27.2 26.2*   MCHC 30.9* 31.8* 32.4     BNP:    Recent Labs  Lab 03/14/18  0330 03/16/18  0512 03/19/18  0527   BNP 1,077* 699* 158*     CMP:    Recent Labs  Lab 03/14/18  0330  03/16/18  0512 03/17/18  0606 03/18/18  0537 03/19/18  0527   *  < > 102 83 93 96   CALCIUM 9.1  < > 9.3 8.6* 8.7 8.6*   ALBUMIN 3.1*  --  3.0*  --   --  2.8*   PROT 6.2  --  6.1  --   --  5.4*     < > 140 136 135* 135*   K 4.2  < > 4.1 3.9 4.0 3.8   CO2 24  < > 27 27 27 27     < > 106 103 102 100   BUN 19  < > 20 20 26* 21   CREATININE 0.9  < > 0.8 0.8 0.8 0.9   ALKPHOS 55  --  52*  --   --  42*   ALT 12  --  8*  --   --  6*   AST 20  --  14  --   --  12   BILITOT 1.3*  --  0.9  --   --  1.3*   < > = values in this interval not displayed.   Coagulation:     Recent Labs  Lab 03/17/18  0606  03/18/18  0537  03/18/18  1754 03/18/18  2333 03/19/18  0527   INR 1.1  --   "1.1  --   --   --  1.1   APTT 23.9  23.9  < > 23.8  23.8  < > 24.4 23.9 26.9  26.9   < > = values in this interval not displayed.  LDH:    Recent Labs  Lab 03/17/18  0606 03/18/18  0537 03/19/18  0527    123 170     Microbiology:  Microbiology Results (last 7 days)     ** No results found for the last 168 hours. **          I have reviewed all pertinent labs within the past 24 hours.      Estimated Creatinine Clearance: 76.8 mL/min (based on SCr of 0.9 mg/dL).    Diagnostic Results:        Assessment and Plan:     No notes on file    * Anemia    - S/P Small Bowel resection 3/13/18.  POD #6  - Minor surgical site bleeding and H/H stable after transfusion at 7.7  - Continue oral iron replacement  - continue PPI BID push and monitor H/H closely  - continue heparin gtt(low dose) for LVAD  - Coumadin resumed at home dose        Hepatitis B core antibody positive since 2012    - Appreciate Hepatology's help: "Pt has evidence of immunity to HBV and no evidence of any viremia. Given above, pt does NOT require treatment with lamivudine if he were to undergo transplantation. He should follow up with hepatology clinic if he were to undergo transplant."  - Hep B viral DNA 3/8/18 < 10          LVAD (left ventricular assist device) present    - HM III placed on 7/2017 as DT with Speed @ 5200  - INR today 1.1. Resume home dose coumadin 2.5/5 at alternate days  - No asa. LDH stable.   - Continue subtherapeutic heparin for now (low dose fixed rate 400u/hr)  - MAP goal of 60-80. Pulsatile. Continue PTA regimen             Ileum ulcer    - Known NSAID-induced ileal ulcer  - GEN surgery following. POD #6 s/p ilieal resection   - HB stable post transfusion  - Diet advanced per gen surg (full diet).           Discussed plan of care with Dr. Leos the attending on service    Baljinder Negrete MD  Heart Transplant  [facility]  "

## 2018-03-19 NOTE — PLAN OF CARE
Problem: Occupational Therapy Goal  Goal: Occupational Therapy Goal  Goals to be met by: 3/28/2018    Pt will complete LB dressing with independence  Pt will be independent with VAD management.   Pt will complete supine with HOB flat to seated at EOB with supervision in prep for seated grooming   Pt will stand at sink to complete grooming with supervision  Pt will complete toilet transfer with SBA  Pt will engage in functional mobility to simulate household distances with supervision in prep for return to occupations of choice.    Outcome: Outcome(s) achieved Date Met: 03/19/18  D/C OT 3/19/18

## 2018-03-19 NOTE — PHYSICIAN QUERY
PT Name: Suman Hayden  MR #: [id number]     Physician Query Form - Documentation Clarification      CDS/: Rashmi Iraheta RN, CCDS             Contact information: [email or web address]    This form is a permanent document in the medical record.     Query Date: March 19, 2018    By submitting this query, we are merely seeking further clarification of documentation. Please utilize your independent clinical judgment when addressing the question(s) below.    The Medical record reflects the following:    Supporting Clinical Findings Location in Medical Record   Chronic systolic congestive heart failure  secondary to non ischemic cardiomyopathy underwent Heartmate  3 LVAD implanted as DT therapy 7/27/17    Mild Pulmonary Hypertension.     2/19 h/p            3/7 cath note                                                                                  Doctor, Please specify diagnosis or diagnoses associated with above clinical findings.    Provider Use Only      Please specify the type of Pulmonary Hypertension:    [     ] Primary pulmonary hypertension (Group 1)  [     ] Other Secondary pulmonary hypertension  (Group 5)  [     ] Secondary pulmonary arterial hypertension due to: _______________  [   X  ] Pulmonary hypertension due to Left  heart disease (Group 2)  [     ] Pulmonary hypertension, unspecified   [     ] Other: ___________________                                                                                                                         [  ] Clinically undetermined

## 2018-03-19 NOTE — PROGRESS NOTES
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 Post-op course complicated by Gi bleeding had double balloon enteropscopy in 12/2017 (bleeding ulcer)  and 1/25/2018 no bleeding but ulcer found.  Retrograde DBE on 1/26/18 which was negative. He had done well for about a month with no further episodes of bleeding, and no dark stools. Then this past week he had is labs checked and was found to be anemic again. He was admitted to the hospital and did pass some melanic stool. He had a repeat upper endoscopy where several previously known about polyps were found but none actively bleeding.     Post-Op Info:  Procedure(s) (LRB):  EXPLORATORY-LAPAROTOMY with small bowel resection  (N/A)  RESECTION-BOWEL   6 Days Post-Op     Interval History: No acute events. Tolerating regular diet. Hgb down to 6.3, transfused 2u yesterday, response to 7.7. Heparin gtt.    Medications:  Continuous Infusions:   heparin (porcine) in 5 % dex 400 Units/hr (03/18/18 0520)     Scheduled Meds:   amLODIPine  5 mg Oral Daily    dronabinol  5 mg Oral TID    furosemide  20 mg Oral BID    lisinopril  5 mg Oral Daily    magnesium oxide  400 mg Oral BID    pantoprazole  40 mg Oral BID AC    potassium chloride  20 mEq Oral Daily    pravastatin  20 mg Oral QHS    sodium chloride 0.9%  10 mL Intravenous Q6H    warfarin  1 mg Oral Daily     PRN Meds:sodium chloride, sodium chloride, acetaminophen, acetaminophen, baclofen, benzonatate, dextrose 50%, glucagon (human recombinant), hydrocodone-acetaminophen 5-325mg, ondansetron, ondansetron, polyethylene glycol, Flushing PICC Protocol **AND** sodium chloride 0.9% **AND** sodium chloride 0.9%, sodium chloride 0.9%     Review of patient's allergies indicates:   Allergen Reactions    Pneumococcal 23-killian ps vaccine      Objective:     Vital Signs (Most Recent):  Temp: 98.3 °F (36.8 °C)  (03/19/18 0513)  Pulse: 78 (03/19/18 0501)  Resp: 16 (03/18/18 2330)  BP: (!) 68/0 (03/19/18 0513)  SpO2: 99 % (03/19/18 0513) Vital Signs (24h Range):  Temp:  [98.2 °F (36.8 °C)-99.7 °F (37.6 °C)] 98.3 °F (36.8 °C)  Pulse:  [76-96] 78  Resp:  [16-18] 16  SpO2:  [95 %-100 %] 99 %  BP: (60-98)/(0-59) 68/0     Weight: 70.4 kg (155 lb 3.3 oz)  Body mass index is 22.92 kg/m².    Intake/Output - Last 3 Shifts       03/17 0700 - 03/18 0659 03/18 0700 - 03/19 0659 03/19 0700 - 03/20 0659    P.O. 660 1080     Total Intake(mL/kg) 660 (9.4) 1080 (15.3)     Urine (mL/kg/hr) 1650 (1) 2575 (1.5)     Stool 0 (0)      Total Output 1650 2575      Net -990 -1495             Stool Occurrence 3 x            Physical Exam   Constitutional: He is oriented to person, place, and time. He appears well-developed and well-nourished. No distress.   HENT:   Head: Normocephalic and atraumatic.   Eyes: Conjunctivae are normal. Right eye exhibits no discharge. Left eye exhibits no discharge. No scleral icterus.   Neck: Normal range of motion. Neck supple. No JVD present. No tracheal deviation present.   Cardiovascular: Normal rate and regular rhythm.    Pulmonary/Chest: Effort normal. No respiratory distress.   Abdominal: Soft. He exhibits no distension. There is no tenderness.   Wound open and packed, no active bleeding when packing removed   Neurological: He is alert and oriented to person, place, and time.   Skin: Skin is warm and dry. No rash noted. He is not diaphoretic. No erythema.       Significant Labs:  CBC:     Recent Labs  Lab 03/19/18 0527   WBC 6.45   RBC 2.94*   HGB 7.7*   HCT 23.8*   *   MCV 81*   MCH 26.2*   MCHC 32.4     BMP:     Recent Labs  Lab 03/19/18  0527   GLU 96   *   K 3.8      CO2 27   BUN 21   CREATININE 0.9   CALCIUM 8.6*   MG 1.7     Significant Diagnostics:  I have reviewed and interpreted all pertinent imaging results/findings within the past 24 hours.    Assessment/Plan:     Ileum ulcer    S/p  ex-lap with SBR on 3/13/18    continue cardiac diet  Continue anticoagulation per primary team; no active bleeding, wound repacked  transfuse as neccessary  Continue LVAD management per primary team    Will continue to follow along            Sharlene Mendoza MD  General Surgery  [facility]

## 2018-03-19 NOTE — ASSESSMENT & PLAN NOTE
- S/P Small Bowel resection 3/13/18.  POD #6  - Minor surgical site bleeding and H/H stable after transfusion at 7.7  - Continue oral iron replacement  - continue PPI BID push and monitor H/H closely  - continue heparin gtt(low dose) for LVAD  - Coumadin resumed at home dose

## 2018-03-19 NOTE — PLAN OF CARE
Problem: Patient Care Overview  Goal: Individualization & Mutuality  Outcome: Ongoing (interventions implemented as appropriate)  Plan of care discussed with patient.  Patient ambulating with assistance, fall precautions in place.LVAD DP and numbers WNL, smooth LVAD hum. Patient has complains of pain, prn medication administered. Discussed medications and care. VSS. AAOx4.  Heparin gtt continued. Abdominal incision gauze changed, site cleansed with sterile water. Patient has no questions at this time. Will continue to monitor.

## 2018-03-19 NOTE — PLAN OF CARE
Problem: Patient Care Overview  Goal: Individualization & Mutuality  Outcome: Ongoing (interventions implemented as appropriate)  Pt remains on Heprin 400 units/h 4ml/hr aPTT 24.4.  Pt received 2 units of blood. H/H 6.3/19.8. Pt remains free of falls and injury

## 2018-03-19 NOTE — PT/OT/SLP DISCHARGE
Physical Therapy Discharge Summary    Name: Suman Hayden  MRN: [id number]   Principal Problem: Anemia     Patient Discharged from acute Physical Therapy on 3/19/2018.  Pt and wife report that pt is IND and has been walking both on floor and t/o the hospital and in the lobby. All in agreement that pt is no longer appropriate for acute PT and that orders will be D/C'd.   PT asked pt if he would like to attempt stairs since he has them at home but pt refused stating that he wants his abdomen to heal prior to attempting stairs.     Assessment:     Goals partially met.    Objective:     GOALS:    Physical Therapy Goals        Problem: Physical Therapy Goal    Goal Priority Disciplines Outcome Goal Variances Interventions   Physical Therapy Goal     PT/OT, PT Ongoing (interventions implemented as appropriate)     Description:  Goals to be met by: 3/21/2018    Patient will increase functional independence with mobility by performin. Supine to sit with [facility] - MET  2. Sit to supine with [facility] - MET  3. Sit to stand transfer with [facility] - MET  4. Gait  x 250 feet with [facility] - MET  5. Ascend/descend 1 flight of stairs with bilateral Handrails [facility] - not met                      Reasons for Discontinuation of Therapy Services  Patient declines to continue care.      Plan:     Patient Discharged to: Home no PT services needed.    Kay Peacock, PT  3/19/2018

## 2018-03-19 NOTE — ASSESSMENT & PLAN NOTE
- HM III placed on 7/2017 as DT with Speed @ 5200  - INR today 1.1. Resume home dose coumadin 2.5/5 at alternate days  - No asa. LDH stable.   - Continue subtherapeutic heparin for now (low dose fixed rate 400u/hr)  - MAP goal of 60-80. Pulsatile. Continue PTA regimen

## 2018-03-19 NOTE — SUBJECTIVE & OBJECTIVE
Interval History: No acute events. Tolerating regular diet. Hgb down to 6.3, transfused 2u yesterday, response to 7.7. Heparin gtt.    Medications:  Continuous Infusions:   heparin (porcine) in 5 % dex 400 Units/hr (03/18/18 0520)     Scheduled Meds:   amLODIPine  5 mg Oral Daily    dronabinol  5 mg Oral TID    furosemide  20 mg Oral BID    lisinopril  5 mg Oral Daily    magnesium oxide  400 mg Oral BID    pantoprazole  40 mg Oral BID AC    potassium chloride  20 mEq Oral Daily    pravastatin  20 mg Oral QHS    sodium chloride 0.9%  10 mL Intravenous Q6H    warfarin  1 mg Oral Daily     PRN Meds:sodium chloride, sodium chloride, acetaminophen, acetaminophen, baclofen, benzonatate, dextrose 50%, glucagon (human recombinant), hydrocodone-acetaminophen 5-325mg, ondansetron, ondansetron, polyethylene glycol, Flushing PICC Protocol **AND** sodium chloride 0.9% **AND** sodium chloride 0.9%, sodium chloride 0.9%     Review of patient's allergies indicates:   Allergen Reactions    Pneumococcal 23-killian ps vaccine      Objective:     Vital Signs (Most Recent):  Temp: 98.3 °F (36.8 °C) (03/19/18 0513)  Pulse: 78 (03/19/18 0501)  Resp: 16 (03/18/18 2330)  BP: (!) 68/0 (03/19/18 0513)  SpO2: 99 % (03/19/18 0513) Vital Signs (24h Range):  Temp:  [98.2 °F (36.8 °C)-99.7 °F (37.6 °C)] 98.3 °F (36.8 °C)  Pulse:  [76-96] 78  Resp:  [16-18] 16  SpO2:  [95 %-100 %] 99 %  BP: (60-98)/(0-59) 68/0     Weight: 70.4 kg (155 lb 3.3 oz)  Body mass index is 22.92 kg/m².    Intake/Output - Last 3 Shifts       03/17 0700 - 03/18 0659 03/18 0700 - 03/19 0659 03/19 0700 - 03/20 0659    P.O. 660 1080     Total Intake(mL/kg) 660 (9.4) 1080 (15.3)     Urine (mL/kg/hr) 1650 (1) 2575 (1.5)     Stool 0 (0)      Total Output 1650 2575      Net -990 -1495             Stool Occurrence 3 x            Physical Exam   Constitutional: He is oriented to person, place, and time. He appears well-developed and well-nourished. No distress.   HENT:    Head: Normocephalic and atraumatic.   Eyes: Conjunctivae are normal. Right eye exhibits no discharge. Left eye exhibits no discharge. No scleral icterus.   Neck: Normal range of motion. Neck supple. No JVD present. No tracheal deviation present.   Cardiovascular: Normal rate and regular rhythm.    Pulmonary/Chest: Effort normal. No respiratory distress.   Abdominal: Soft. He exhibits no distension. There is no tenderness.   Wound open and packed, no active bleeding when packing removed   Neurological: He is alert and oriented to person, place, and time.   Skin: Skin is warm and dry. No rash noted. He is not diaphoretic. No erythema.       Significant Labs:  CBC:     Recent Labs  Lab 03/19/18  0527   WBC 6.45   RBC 2.94*   HGB 7.7*   HCT 23.8*   *   MCV 81*   MCH 26.2*   MCHC 32.4     BMP:     Recent Labs  Lab 03/19/18  0527   GLU 96   *   K 3.8      CO2 27   BUN 21   CREATININE 0.9   CALCIUM 8.6*   MG 1.7     Significant Diagnostics:  I have reviewed and interpreted all pertinent imaging results/findings within the past 24 hours.

## 2018-03-19 NOTE — SUBJECTIVE & OBJECTIVE
Interval History:    Sited on chair this morning. No acute overnight event. Bleeding from surgical site decreased significantly. Pain is also adequately controlled. Surgery following daily with dressing change.     Continuous Infusions:   heparin (porcine) in 5 % dex 400 Units/hr (03/18/18 0520)     Scheduled Meds:   amLODIPine  5 mg Oral Daily    dronabinol  5 mg Oral TID    furosemide  20 mg Oral BID    lisinopril  5 mg Oral Daily    magnesium oxide  400 mg Oral BID    pantoprazole  40 mg Oral BID AC    potassium chloride  20 mEq Oral Daily    pravastatin  20 mg Oral QHS    sodium chloride 0.9%  10 mL Intravenous Q6H    warfarin  2.5 mg Oral Once     PRN Meds:sodium chloride, sodium chloride, acetaminophen, acetaminophen, baclofen, benzonatate, dextrose 50%, glucagon (human recombinant), hydrocodone-acetaminophen 5-325mg, ondansetron, ondansetron, polyethylene glycol, Flushing PICC Protocol **AND** sodium chloride 0.9% **AND** sodium chloride 0.9%, sodium chloride 0.9%    Review of patient's allergies indicates:   Allergen Reactions    Pneumococcal 23-killian ps vaccine      Objective:     Vital Signs (Most Recent):  Temp: 99.7 °F (37.6 °C) (03/19/18 1215)  Pulse: 82 (03/19/18 1215)  Resp: 18 (03/19/18 1215)  BP: (!) 78/0 (03/19/18 0800)  SpO2: (!) 92 % (03/19/18 1215) Vital Signs (24h Range):  Temp:  [98.2 °F (36.8 °C)-99.7 °F (37.6 °C)] 99.7 °F (37.6 °C)  Pulse:  [75-84] 82  Resp:  [15-18] 18  SpO2:  [92 %-99 %] 92 %  BP: (60-83)/(0-52) 78/0     Patient Vitals for the past 72 hrs (Last 3 readings):   Weight   03/19/18 0700 68.2 kg (150 lb 5.7 oz)   03/18/18 0834 70.4 kg (155 lb 3.3 oz)   03/17/18 0738 70.4 kg (155 lb 3.3 oz)     Body mass index is 22.2 kg/m².      Intake/Output Summary (Last 24 hours) at 03/19/18 1227  Last data filed at 03/19/18 0600   Gross per 24 hour   Intake              840 ml   Output             2075 ml   Net            -1235 ml       Hemodynamic Parameters:       Telemetry:  reviewed and no event noted    Physical Exam   Constitutional: He is oriented to person, place, and time.   HENT:   Temporal wasting   Neck: No JVD present.   Cardiovascular:   LVAD hum- smooth   Pulmonary/Chest: Effort normal and breath sounds normal.   Abdominal: Soft. Bowel sounds are normal.   Mid lower abd quad wound covered with clean dry dressing   Musculoskeletal: He exhibits no edema or tenderness.   Neurological: He is alert and oriented to person, place, and time.   Skin: Skin is warm and dry.   Nursing note and vitals reviewed.        Significant Labs:  CBC:    Recent Labs  Lab 03/17/18  0606 03/18/18  0537 03/19/18  0527   WBC 5.27 6.45 6.45   RBC 2.50* 2.32* 2.94*   HGB 6.7* 6.3* 7.7*   HCT 21.7* 19.8* 23.8*   * 143* 134*   MCV 87 85 81*   MCH 26.8* 27.2 26.2*   MCHC 30.9* 31.8* 32.4     BNP:    Recent Labs  Lab 03/14/18  0330 03/16/18  0512 03/19/18  0527   BNP 1,077* 699* 158*     CMP:    Recent Labs  Lab 03/14/18  0330 03/16/18  0512 03/17/18  0606 03/18/18  0537 03/19/18  0527   *  < > 102 83 93 96   CALCIUM 9.1  < > 9.3 8.6* 8.7 8.6*   ALBUMIN 3.1*  --  3.0*  --   --  2.8*   PROT 6.2  --  6.1  --   --  5.4*     < > 140 136 135* 135*   K 4.2  < > 4.1 3.9 4.0 3.8   CO2 24  < > 27 27 27 27     < > 106 103 102 100   BUN 19  < > 20 20 26* 21   CREATININE 0.9  < > 0.8 0.8 0.8 0.9   ALKPHOS 55  --  52*  --   --  42*   ALT 12  --  8*  --   --  6*   AST 20  --  14  --   --  12   BILITOT 1.3*  --  0.9  --   --  1.3*   < > = values in this interval not displayed.   Coagulation:     Recent Labs  Lab 03/17/18  0606  03/18/18  0537  03/18/18  1754 03/18/18  2333 03/19/18  0527   INR 1.1  --  1.1  --   --   --  1.1   APTT 23.9  23.9  < > 23.8  23.8  < > 24.4 23.9 26.9  26.9   < > = values in this interval not displayed.  LDH:    Recent Labs  Lab 03/17/18  0606 03/18/18  0537 03/19/18  0527    123 170     Microbiology:  Microbiology Results (last 7 days)     ** No results  found for the last 168 hours. **          I have reviewed all pertinent labs within the past 24 hours.      Estimated Creatinine Clearance: 76.8 mL/min (based on SCr of 0.9 mg/dL).    Diagnostic Results:

## 2018-03-19 NOTE — PLAN OF CARE
Problem: Patient Care Overview  Goal: Plan of Care Review  Outcome: Ongoing (interventions implemented as appropriate)  Patient remains free of falls or injury. Patient denies pain. S/p exploratory lap and bowel resection on 3/13/18; stepped down to CSU on 3/14/18. MS incision with staples; packed and dressed per surgery. Continued on heparin drip; aPTTs q6 hours. Current INR 1.1. Occult stool sample (+). Current H&H 6.3/19.8; 2 units PRBCs given on day shift; repeat CBC in AM. Plan of care reviewed with patient and wife.

## 2018-03-19 NOTE — ASSESSMENT & PLAN NOTE
S/p ex-lap with SBR on 3/13/18    continue cardiac diet  Continue anticoagulation per primary team; no active bleeding, wound repacked  transfuse as neccessary  Continue LVAD management per primary team    Will continue to follow along

## 2018-03-20 NOTE — ASSESSMENT & PLAN NOTE
- S/P Small Bowel resection 3/13/18.  POD #7  - Minor surgical site bleeding and H/H stable after transfusion at 7.3  - Continue oral iron replacement  - continue PPI BID push and monitor H/H closely  - continue heparin gtt(low dose) for LVAD  - Coumadin 5mg today

## 2018-03-20 NOTE — PLAN OF CARE
Problem: Patient Care Overview  Goal: Plan of Care Review  Outcome: Ongoing (interventions implemented as appropriate)  Patient remains free of falls or injury. Patient denies pain. S/p exploratory lap and bowel resection on 3/13/18; stepped down to CSU on 3/14/18. MS incision with staples; packed and dressed per surgery. Continued on heparin drip; aPTTs q6 hours. Current INR 1.1. Current H&H 7.7/23.8. Plan of care reviewed with patient and wife.

## 2018-03-20 NOTE — PLAN OF CARE
Problem: Patient Care Overview  Goal: Individualization & Mutuality  Outcome: Ongoing (interventions implemented as appropriate)  Plan of care discussed with patient.  Patient ambulating with assistance, fall precautions in place.LVAD DP and numbers WNL, smooth LVAD hum. Patient has complains of pain, prn medication administered. Discussed medications and care. VSS. AAOx4.  Heparin gtt continued. Patient has no questions at this time. Will continue to monitor.

## 2018-03-20 NOTE — ASSESSMENT & PLAN NOTE
- HM III placed on 7/2017 as DT with Speed @ 5200  - INR today 1. Resume home dose coumadin 2.5/5 at alternate days  - No asa. LDH stable and at baseline   - Continue subtherapeutic heparin for now (low dose fixed rate 400u/hr)  - MAP goal of 60-80. Pulsatile. Continue PTA regimen

## 2018-03-20 NOTE — PROGRESS NOTES
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 Post-op course complicated by Gi bleeding had double balloon enteropscopy in 12/2017 (bleeding ulcer)  and 1/25/2018 no bleeding but ulcer found.  Retrograde DBE on 1/26/18 which was negative. He had done well for about a month with no further episodes of bleeding, and no dark stools. Then this past week he had is labs checked and was found to be anemic again. He was admitted to the hospital and did pass some melanic stool. He had a repeat upper endoscopy where several previously known about polyps were found but none actively bleeding.     Post-Op Info:  Procedure(s) (LRB):  EXPLORATORY-LAPAROTOMY with small bowel resection  (N/A)  RESECTION-BOWEL   7 Days Post-Op     Interval History: Patient doing well, continues with slow bleed from wound, packing changed yesterday.     Medications:  Continuous Infusions:   heparin (porcine) in 5 % dex 400 Units/hr (03/18/18 0520)     Scheduled Meds:   amLODIPine  5 mg Oral Daily    dronabinol  5 mg Oral TID    furosemide  20 mg Oral BID    lisinopril  5 mg Oral Daily    magnesium oxide  400 mg Oral BID    pantoprazole  40 mg Oral BID AC    potassium chloride  20 mEq Oral Daily    pravastatin  20 mg Oral QHS    sodium chloride 0.9%  10 mL Intravenous Q6H     PRN Meds:sodium chloride, sodium chloride, acetaminophen, acetaminophen, baclofen, benzonatate, dextrose 50%, glucagon (human recombinant), hydrocodone-acetaminophen 5-325mg, ondansetron, ondansetron, polyethylene glycol, Flushing PICC Protocol **AND** sodium chloride 0.9% **AND** sodium chloride 0.9%, sodium chloride 0.9%     Review of patient's allergies indicates:   Allergen Reactions    Pneumococcal 23-killian ps vaccine      Objective:     Vital Signs (Most Recent):  Temp: 98.5 °F (36.9 °C) (03/20/18 0439)  Pulse: 78 (03/20/18 0501)  Resp: 18 (03/20/18  2895)  BP: (!) 64/0 (03/20/18 0415)  SpO2: 99 % (03/20/18 0439) Vital Signs (24h Range):  Temp:  [98.5 °F (36.9 °C)-99.7 °F (37.6 °C)] 98.5 °F (36.9 °C)  Pulse:  [70-82] 78  Resp:  [15-18] 18  SpO2:  [92 %-99 %] 99 %  BP: (60-78)/(0) 64/0     Weight: 68.2 kg (150 lb 5.7 oz)  Body mass index is 22.2 kg/m².    Intake/Output - Last 3 Shifts       03/18 0700 - 03/19 0659 03/19 0700 - 03/20 0659 03/20 0700 - 03/21 0659    P.O. 1080 1620     Total Intake(mL/kg) 1080 (15.3) 1620 (23.8)     Urine (mL/kg/hr) 2575 (1.5) 1900 (1.2)     Stool  0 (0)     Total Output 2575 1900      Net -1495 -280             Stool Occurrence  0 x           Physical Exam   Constitutional: He is oriented to person, place, and time. He appears well-developed and well-nourished. No distress.   HENT:   Head: Normocephalic and atraumatic.   Eyes: Conjunctivae are normal. Right eye exhibits no discharge. Left eye exhibits no discharge. No scleral icterus.   Neck: Normal range of motion. Neck supple. No JVD present. No tracheal deviation present.   Cardiovascular: Normal rate and regular rhythm.    Pulmonary/Chest: Effort normal. No respiratory distress.   Abdominal: Soft. He exhibits no distension. There is no tenderness.   Wound open and packed, no active bleeding when packing removed   Neurological: He is alert and oriented to person, place, and time.   Skin: Skin is warm and dry. No rash noted. He is not diaphoretic. No erythema.       Significant Labs:  CBC:     Recent Labs  Lab 03/20/18 0428   WBC 5.72   RBC 2.81*   HGB 7.3*   HCT 22.8*   *   MCV 81*   MCH 26.0*   MCHC 32.0     BMP:     Recent Labs  Lab 03/20/18 0428   GLU 94   *   K 4.3      CO2 28   BUN 25*   CREATININE 0.8   CALCIUM 8.9   MG 1.9     Significant Diagnostics:  I have reviewed and interpreted all pertinent imaging results/findings within the past 24 hours.    Assessment/Plan:     Ileum ulcer    S/p ex-lap with SBR on 3/13/18    continue cardiac diet  Continue  anticoagulation per primary team;  wound repacked with hemostatic agent  transfuse as neccessary  Continue LVAD management per primary team    Will continue to follow along            Baljinder Slater MD  General Surgery  [facility]

## 2018-03-20 NOTE — SUBJECTIVE & OBJECTIVE
Interval History: Patient doing well, continues with slow bleed from wound, packing changed yesterday.     Medications:  Continuous Infusions:   heparin (porcine) in 5 % dex 400 Units/hr (03/18/18 0520)     Scheduled Meds:   amLODIPine  5 mg Oral Daily    dronabinol  5 mg Oral TID    furosemide  20 mg Oral BID    lisinopril  5 mg Oral Daily    magnesium oxide  400 mg Oral BID    pantoprazole  40 mg Oral BID AC    potassium chloride  20 mEq Oral Daily    pravastatin  20 mg Oral QHS    sodium chloride 0.9%  10 mL Intravenous Q6H     PRN Meds:sodium chloride, sodium chloride, acetaminophen, acetaminophen, baclofen, benzonatate, dextrose 50%, glucagon (human recombinant), hydrocodone-acetaminophen 5-325mg, ondansetron, ondansetron, polyethylene glycol, Flushing PICC Protocol **AND** sodium chloride 0.9% **AND** sodium chloride 0.9%, sodium chloride 0.9%     Review of patient's allergies indicates:   Allergen Reactions    Pneumococcal 23-killian ps vaccine      Objective:     Vital Signs (Most Recent):  Temp: 98.5 °F (36.9 °C) (03/20/18 0439)  Pulse: 78 (03/20/18 0501)  Resp: 18 (03/20/18 0439)  BP: (!) 64/0 (03/20/18 0415)  SpO2: 99 % (03/20/18 0439) Vital Signs (24h Range):  Temp:  [98.5 °F (36.9 °C)-99.7 °F (37.6 °C)] 98.5 °F (36.9 °C)  Pulse:  [70-82] 78  Resp:  [15-18] 18  SpO2:  [92 %-99 %] 99 %  BP: (60-78)/(0) 64/0     Weight: 68.2 kg (150 lb 5.7 oz)  Body mass index is 22.2 kg/m².    Intake/Output - Last 3 Shifts       03/18 0700 - 03/19 0659 03/19 0700 - 03/20 0659 03/20 0700 - 03/21 0659    P.O. 1080 1620     Total Intake(mL/kg) 1080 (15.3) 1620 (23.8)     Urine (mL/kg/hr) 2575 (1.5) 1900 (1.2)     Stool  0 (0)     Total Output 2575 1900      Net -1495 -280             Stool Occurrence  0 x           Physical Exam   Constitutional: He is oriented to person, place, and time. He appears well-developed and well-nourished. No distress.   HENT:   Head: Normocephalic and atraumatic.   Eyes: Conjunctivae are  normal. Right eye exhibits no discharge. Left eye exhibits no discharge. No scleral icterus.   Neck: Normal range of motion. Neck supple. No JVD present. No tracheal deviation present.   Cardiovascular: Normal rate and regular rhythm.    Pulmonary/Chest: Effort normal. No respiratory distress.   Abdominal: Soft. He exhibits no distension. There is no tenderness.   Wound open and packed, no active bleeding when packing removed   Neurological: He is alert and oriented to person, place, and time.   Skin: Skin is warm and dry. No rash noted. He is not diaphoretic. No erythema.       Significant Labs:  CBC:     Recent Labs  Lab 03/20/18 0428   WBC 5.72   RBC 2.81*   HGB 7.3*   HCT 22.8*   *   MCV 81*   MCH 26.0*   MCHC 32.0     BMP:     Recent Labs  Lab 03/20/18 0428   GLU 94   *   K 4.3      CO2 28   BUN 25*   CREATININE 0.8   CALCIUM 8.9   MG 1.9     Significant Diagnostics:  I have reviewed and interpreted all pertinent imaging results/findings within the past 24 hours.

## 2018-03-20 NOTE — PROGRESS NOTES
Update:     JITENDRA and supervisor Francis to pt's room. Pt presents aaox4 with open affect. Pt's wife in room with pt. Pt states understanding of doctor's plan. Providing ongoing psychosocial and counseling support, education, resources, assistance and discharge planning as indicated. Following and available. Providing ongoing psychosocial and counseling support, education, resources, assistance and discharge planning as indicated. Following and available.

## 2018-03-20 NOTE — PROGRESS NOTES
[facility]  Heart Transplant  Progress Note    Patient Name: Suman Hayden  MRN: [id number]  Admission Date: 3/5/2018  Hospital Length of Stay: 15 days  Attending Physician: Ortega Leos MD  Primary Care Provider: Joe Ernst MD  Principal Problem:Anemia    Subjective:     Interval History:     Denies acute overnight event. Incision site with less bleeding and undergoing dressing change with gen surgery. H/H stable at 7.3 today. LDH at baseline around 130.    Continuous Infusions:   heparin (porcine) in 5 % dex 400 Units/hr (03/18/18 0520)     Scheduled Meds:   amLODIPine  5 mg Oral Daily    dronabinol  5 mg Oral TID    furosemide  20 mg Oral BID    lisinopril  5 mg Oral Daily    magnesium oxide  400 mg Oral BID    pantoprazole  40 mg Oral BID AC    potassium chloride  20 mEq Oral Daily    pravastatin  20 mg Oral QHS    sodium chloride 0.9%  10 mL Intravenous Q6H    warfarin  5 mg Oral Once     PRN Meds:sodium chloride, sodium chloride, acetaminophen, acetaminophen, baclofen, benzonatate, dextrose 50%, glucagon (human recombinant), hydrocodone-acetaminophen 5-325mg, ondansetron, ondansetron, polyethylene glycol, Flushing PICC Protocol **AND** sodium chloride 0.9% **AND** sodium chloride 0.9%, sodium chloride 0.9%    Review of patient's allergies indicates:   Allergen Reactions    Pneumococcal 23-killian ps vaccine      Objective:     Vital Signs (Most Recent):  Temp: 98.8 °F (37.1 °C) (03/20/18 1111)  Pulse: 80 (03/20/18 1111)  Resp: 18 (03/20/18 1111)  BP: (!) 66/0 (03/20/18 1111)  SpO2: 99 % (03/20/18 1111) Vital Signs (24h Range):  Temp:  [98.5 °F (36.9 °C)-98.9 °F (37.2 °C)] 98.8 °F (37.1 °C)  Pulse:  [70-80] 80  Resp:  [16-18] 18  SpO2:  [95 %-99 %] 99 %  BP: (60-78)/(0) 66/0     Patient Vitals for the past 72 hrs (Last 3 readings):   Weight   03/20/18 0700 68.1 kg (150 lb 2.1 oz)   03/19/18 0700 68.2 kg (150 lb 5.7 oz)   03/18/18 0834 70.4 kg (155 lb 3.3 oz)     Body mass  index is 22.17 kg/m².      Intake/Output Summary (Last 24 hours) at 03/20/18 1257  Last data filed at 03/20/18 0700   Gross per 24 hour   Intake             1140 ml   Output             2200 ml   Net            -1060 ml       Hemodynamic Parameters:       Telemetry:     Physical Exam     Constitutional: He is oriented to person, place, and time.   HENT:   Temporal wasting   Neck: No JVD present.   Cardiovascular:   LVAD hum- smooth   Pulmonary/Chest: Effort normal and breath sounds normal.   Abdominal: Soft. Bowel sounds are normal.   Mid lower abd quad wound covered with soaked dressing but no active bleed.   Musculoskeletal: He exhibits no edema or tenderness.   Neurological: He is alert and oriented to person, place, and time.   Skin: Skin is warm and dry.   Nursing note and vitals reviewed.    Significant Labs:  CBC:    Recent Labs  Lab 03/18/18  0537 03/19/18  0527 03/20/18  0428   WBC 6.45 6.45 5.72   RBC 2.32* 2.94* 2.81*   HGB 6.3* 7.7* 7.3*   HCT 19.8* 23.8* 22.8*   * 134* 147*   MCV 85 81* 81*   MCH 27.2 26.2* 26.0*   MCHC 31.8* 32.4 32.0     BNP:    Recent Labs  Lab 03/14/18  0330 03/16/18  0512 03/19/18  0527   BNP 1,077* 699* 158*     CMP:    Recent Labs  Lab 03/14/18  0330 03/16/18  0512 03/18/18  0537 03/19/18  0527 03/20/18  0428   *  < > 102  < > 93 96 94   CALCIUM 9.1  < > 9.3  < > 8.7 8.6* 8.9   ALBUMIN 3.1*  --  3.0*  --   --  2.8*  --    PROT 6.2  --  6.1  --   --  5.4*  --      < > 140  < > 135* 135* 135*   K 4.2  < > 4.1  < > 4.0 3.8 4.3   CO2 24  < > 27  < > 27 27 28     < > 106  < > 102 100 100   BUN 19  < > 20  < > 26* 21 25*   CREATININE 0.9  < > 0.8  < > 0.8 0.9 0.8   ALKPHOS 55  --  52*  --   --  42*  --    ALT 12  --  8*  --   --  6*  --    AST 20  --  14  --   --  12  --    BILITOT 1.3*  --  0.9  --   --  1.3*  --    < > = values in this interval not displayed.   Coagulation:     Recent Labs  Lab 03/18/18  0537  03/19/18  0527  03/19/18  2328 03/20/18  0428  "03/20/18  1154   INR 1.1  --  1.1  --   --  1.0  --    APTT 23.8  23.8  < > 26.9  26.9  < > 25.7 27.9  27.9 24.6   < > = values in this interval not displayed.  LDH:    Recent Labs  Lab 03/18/18  0537 03/19/18  0527 03/20/18  0428    170 138     Microbiology:  Microbiology Results (last 7 days)     ** No results found for the last 168 hours. **          I have reviewed all pertinent labs within the past 24 hours.      Estimated Creatinine Clearance: 86.3 mL/min (based on SCr of 0.8 mg/dL).    Diagnostic Results:        Assessment and Plan:     No notes on file    * Anemia    - S/P Small Bowel resection 3/13/18.  POD #7  - Minor surgical site bleeding and H/H stable after transfusion at 7.3  - Continue oral iron replacement  - continue PPI BID push and monitor H/H closely  - continue heparin gtt(low dose) for LVAD  - Coumadin 5mg today        Hepatitis B core antibody positive since 2012    - Appreciate Hepatology's help: "Pt has evidence of immunity to HBV and no evidence of any viremia. Given above, pt does NOT require treatment with lamivudine if he were to undergo transplantation. He should follow up with hepatology clinic if he were to undergo transplant."  - Hep B viral DNA 3/8/18 < 10          LVAD (left ventricular assist device) present    - HM III placed on 7/2017 as DT with Speed @ 5200  - INR today 1. Resume home dose coumadin 2.5/5 at alternate days  - No asa. LDH stable and at baseline   - Continue subtherapeutic heparin for now (low dose fixed rate 400u/hr)  - MAP goal of 60-80. Pulsatile. Continue PTA regimen             Ileum ulcer    - Known NSAID-induced ileal ulcer  - GEN surgery following. POD #7 s/p ilieal resection   - H/H stable after 2 PRBC at 7.3  - tolerating full diet  - pathology negative for malignacy        Discussed plan of care with dr. Duff the attending on service.    Baljinder Negrete MD  Heart Transplant  [facility]  "

## 2018-03-20 NOTE — SUBJECTIVE & OBJECTIVE
Interval History:     Denies acute overnight event. Incision site with less bleeding and undergoing dressing change with gen surgery. H/H stable at 7.3 today    Continuous Infusions:   heparin (porcine) in 5 % dex 400 Units/hr (03/18/18 0520)     Scheduled Meds:   amLODIPine  5 mg Oral Daily    dronabinol  5 mg Oral TID    furosemide  20 mg Oral BID    lisinopril  5 mg Oral Daily    magnesium oxide  400 mg Oral BID    pantoprazole  40 mg Oral BID AC    potassium chloride  20 mEq Oral Daily    pravastatin  20 mg Oral QHS    sodium chloride 0.9%  10 mL Intravenous Q6H    warfarin  5 mg Oral Once     PRN Meds:sodium chloride, sodium chloride, acetaminophen, acetaminophen, baclofen, benzonatate, dextrose 50%, glucagon (human recombinant), hydrocodone-acetaminophen 5-325mg, ondansetron, ondansetron, polyethylene glycol, Flushing PICC Protocol **AND** sodium chloride 0.9% **AND** sodium chloride 0.9%, sodium chloride 0.9%    Review of patient's allergies indicates:   Allergen Reactions    Pneumococcal 23-killian ps vaccine      Objective:     Vital Signs (Most Recent):  Temp: 98.8 °F (37.1 °C) (03/20/18 1111)  Pulse: 80 (03/20/18 1111)  Resp: 18 (03/20/18 1111)  BP: (!) 66/0 (03/20/18 1111)  SpO2: 99 % (03/20/18 1111) Vital Signs (24h Range):  Temp:  [98.5 °F (36.9 °C)-98.9 °F (37.2 °C)] 98.8 °F (37.1 °C)  Pulse:  [70-80] 80  Resp:  [16-18] 18  SpO2:  [95 %-99 %] 99 %  BP: (60-78)/(0) 66/0     Patient Vitals for the past 72 hrs (Last 3 readings):   Weight   03/20/18 0700 68.1 kg (150 lb 2.1 oz)   03/19/18 0700 68.2 kg (150 lb 5.7 oz)   03/18/18 0834 70.4 kg (155 lb 3.3 oz)     Body mass index is 22.17 kg/m².      Intake/Output Summary (Last 24 hours) at 03/20/18 1257  Last data filed at 03/20/18 0700   Gross per 24 hour   Intake             1140 ml   Output             2200 ml   Net            -1060 ml       Hemodynamic Parameters:       Telemetry:     Physical Exam     Constitutional: He is oriented to person,  place, and time.   HENT:   Temporal wasting   Neck: No JVD present.   Cardiovascular:   LVAD hum- smooth   Pulmonary/Chest: Effort normal and breath sounds normal.   Abdominal: Soft. Bowel sounds are normal.   Mid lower abd quad wound covered with soaked dressing but no active bleed.   Musculoskeletal: He exhibits no edema or tenderness.   Neurological: He is alert and oriented to person, place, and time.   Skin: Skin is warm and dry.   Nursing note and vitals reviewed.    Significant Labs:  CBC:    Recent Labs  Lab 03/18/18  0537 03/19/18 0527 03/20/18  0428   WBC 6.45 6.45 5.72   RBC 2.32* 2.94* 2.81*   HGB 6.3* 7.7* 7.3*   HCT 19.8* 23.8* 22.8*   * 134* 147*   MCV 85 81* 81*   MCH 27.2 26.2* 26.0*   MCHC 31.8* 32.4 32.0     BNP:    Recent Labs  Lab 03/14/18  0330 03/16/18  0512 03/19/18  0527   BNP 1,077* 699* 158*     CMP:    Recent Labs  Lab 03/14/18  0330  03/16/18  0512 03/18/18  0537 03/19/18  0527 03/20/18  0428   *  < > 102  < > 93 96 94   CALCIUM 9.1  < > 9.3  < > 8.7 8.6* 8.9   ALBUMIN 3.1*  --  3.0*  --   --  2.8*  --    PROT 6.2  --  6.1  --   --  5.4*  --      < > 140  < > 135* 135* 135*   K 4.2  < > 4.1  < > 4.0 3.8 4.3   CO2 24  < > 27  < > 27 27 28     < > 106  < > 102 100 100   BUN 19  < > 20  < > 26* 21 25*   CREATININE 0.9  < > 0.8  < > 0.8 0.9 0.8   ALKPHOS 55  --  52*  --   --  42*  --    ALT 12  --  8*  --   --  6*  --    AST 20  --  14  --   --  12  --    BILITOT 1.3*  --  0.9  --   --  1.3*  --    < > = values in this interval not displayed.   Coagulation:     Recent Labs  Lab 03/18/18  0537  03/19/18  0527 03/19/18 2328 03/20/18  0428 03/20/18  1154   INR 1.1  --  1.1  --   --  1.0  --    APTT 23.8  23.8  < > 26.9  26.9  < > 25.7 27.9  27.9 24.6   < > = values in this interval not displayed.  LDH:    Recent Labs  Lab 03/18/18 0537 03/19/18 0527 03/20/18 0428    170 138     Microbiology:  Microbiology Results (last 7 days)     ** No results found  for the last 168 hours. **          I have reviewed all pertinent labs within the past 24 hours.      Estimated Creatinine Clearance: 86.3 mL/min (based on SCr of 0.8 mg/dL).    Diagnostic Results:

## 2018-03-20 NOTE — ASSESSMENT & PLAN NOTE
S/p ex-lap with SBR on 3/13/18    continue cardiac diet  Continue anticoagulation per primary team;  wound repacked with hemostatic agent  transfuse as neccessary  Continue LVAD management per primary team    Will continue to follow along

## 2018-03-21 NOTE — PLAN OF CARE
Problem: Patient Care Overview  Goal: Plan of Care Review  Outcome: Ongoing (interventions implemented as appropriate)  Patient progressing toward all goals. Plan of care discussed with patient, no questions at this time. Patient ambulating independently, fall precautions in place. Heparin changed to weight-based; H & H increased; VS & LVAD numbers WNL. Will monitor.

## 2018-03-21 NOTE — PROGRESS NOTES
Pt aaox3, lying in bed with wife at bedside. No complaints at this time. Pt receiving continuous Heparin gtt until INR is therapeutic. Pt recovering well from surgery. No needs at this time.

## 2018-03-21 NOTE — ASSESSMENT & PLAN NOTE
S/p ex-lap with SBR on 3/13/18    continue cardiac diet  Continue anticoagulation per primary team;  wound repacked with gauze this AM  transfuse as neccessary  Continue LVAD management per primary team    Will continue to follow along

## 2018-03-21 NOTE — ASSESSMENT & PLAN NOTE
- HM III placed on 7/2017 as DT with Speed @ 5200  - INR today 1.1 coumadin 5mg given x1. Heparin at full bridging dose. No ASA  -  LDH stable and at baseline   - MAP goal of 60-80. Pulsatile. Continue PTA regimen

## 2018-03-21 NOTE — SUBJECTIVE & OBJECTIVE
Interval History:     No acute overnight event. Denies abd pain, tenderness or dizziness. He had x2 BM yesterday that were dark and bloody. Hemoglobin trending up.    Continuous Infusions:   heparin (porcine) in 5 % dex       Scheduled Meds:   amLODIPine  5 mg Oral Daily    dronabinol  5 mg Oral TID    furosemide  20 mg Oral BID    lisinopril  5 mg Oral Daily    magnesium oxide  400 mg Oral BID    pantoprazole  40 mg Oral BID AC    potassium chloride  20 mEq Oral Daily    pravastatin  20 mg Oral QHS    sodium chloride 0.9%  10 mL Intravenous Q6H    warfarin  5 mg Oral Once     PRN Meds:sodium chloride, sodium chloride, acetaminophen, acetaminophen, baclofen, benzonatate, dextrose 50%, glucagon (human recombinant), hydrocodone-acetaminophen 5-325mg, ondansetron, ondansetron, polyethylene glycol, Flushing PICC Protocol **AND** sodium chloride 0.9% **AND** sodium chloride 0.9%, sodium chloride 0.9%    Review of patient's allergies indicates:   Allergen Reactions    Pneumococcal 23-killian ps vaccine      Objective:     Vital Signs (Most Recent):  Temp: 98.3 °F (36.8 °C) (03/21/18 0916)  Pulse: 84 (03/21/18 0916)  Resp: 16 (03/21/18 0916)  BP: (!) 66/0 (03/21/18 0435)  SpO2: 99 % (03/21/18 0916) Vital Signs (24h Range):  Temp:  [97.9 °F (36.6 °C)-98.9 °F (37.2 °C)] 98.3 °F (36.8 °C)  Pulse:  [] 84  Resp:  [16-17] 16  SpO2:  [97 %-99 %] 99 %  BP: (64-66)/(0) 66/0     Patient Vitals for the past 72 hrs (Last 3 readings):   Weight   03/21/18 0700 67.5 kg (148 lb 13 oz)   03/20/18 0700 68.1 kg (150 lb 2.1 oz)   03/19/18 0700 68.2 kg (150 lb 5.7 oz)     Body mass index is 21.98 kg/m².      Intake/Output Summary (Last 24 hours) at 03/21/18 1143  Last data filed at 03/21/18 0500   Gross per 24 hour   Intake              720 ml   Output             2000 ml   Net            -1280 ml       Hemodynamic Parameters:     Telemetry:     Physical Exam    Constitutional: He is oriented to person, place, and time.   HENT:    Temporal wasting   Neck: No JVD present.   Cardiovascular:   LVAD hum- smooth   Pulmonary/Chest: Effort normal and breath sounds normal.   Abdominal: Soft. Bowel sounds are normal.   Mid lower abd quad wound covered with soaked dressing but no active bleed.   Musculoskeletal: He exhibits no edema or tenderness.   Neurological: He is alert and oriented to person, place, and time.   Skin: Skin is warm and dry.   Nursing note and vitals reviewed.       Significant Labs:  CBC:    Recent Labs  Lab 03/19/18 0527 03/20/18 0428 03/21/18 0512   WBC 6.45 5.72 5.22   RBC 2.94* 2.81* 2.86*   HGB 7.7* 7.3* 7.5*   HCT 23.8* 22.8* 24.2*   * 147* 186   MCV 81* 81* 85   MCH 26.2* 26.0* 26.2*   MCHC 32.4 32.0 31.0*     BNP:    Recent Labs  Lab 03/16/18 0512 03/19/18 0527 03/21/18 0512   * 158* 126*     CMP:    Recent Labs  Lab 03/16/18 0512 03/19/18 0527 03/20/18 0428 03/21/18 0512     < > 96 94 81   CALCIUM 9.3  < > 8.6* 8.9 8.8   ALBUMIN 3.0*  --  2.8*  --  2.9*   PROT 6.1  --  5.4*  --  5.7*     < > 135* 135* 135*   K 4.1  < > 3.8 4.3 4.3   CO2 27  < > 27 28 28     < > 100 100 100   BUN 20  < > 21 25* 20   CREATININE 0.8  < > 0.9 0.8 0.8   ALKPHOS 52*  --  42*  --  48*   ALT 8*  --  6*  --  8*   AST 14  --  12  --  14   BILITOT 0.9  --  1.3*  --  0.8   < > = values in this interval not displayed.   Coagulation:     Recent Labs  Lab 03/19/18 0527 03/20/18 0428 03/20/18  1751 03/21/18  0008 03/21/18  0512   INR 1.1  --  1.0  --   --   --  1.1   APTT 26.9  26.9  < > 27.9  27.9  < > 25.3 24.6 25.2  25.2   < > = values in this interval not displayed.  LDH:    Recent Labs  Lab 03/19/18  0527 03/20/18  0428 03/21/18  0512    138 147     Microbiology:  Microbiology Results (last 7 days)     ** No results found for the last 168 hours. **          I have reviewed all pertinent labs within the past 24 hours.      Estimated Creatinine Clearance: 85.5 mL/min (based on SCr of 0.8  mg/dL).    Diagnostic Results:

## 2018-03-21 NOTE — ASSESSMENT & PLAN NOTE
- S/P Small Bowel resection 3/13/18.  POD #7  - Minor surgical site bleeding and H/H stable after transfusion at 7.5  - Continue oral iron replacement  - continue oral PPI  - continue heparin gtt bridging dose for LVAD  - Coumadin 5mg given today

## 2018-03-21 NOTE — PROGRESS NOTES
[facility]  Heart Transplant  Progress Note    HTS staff  LVAD pt who seems to recovering from S/p ex-lap with SBR on 3/13/18 Awaiting INR > 2  Resume full dose heparin          Patient Name: Suman Hayden  MRN: [id number]  Admission Date: 3/5/2018  Hospital Length of Stay: 16 days  Attending Physician: Ortega Leos MD  Primary Care Provider: Joe Ernst MD  Principal Problem:Anemia    Subjective:     Interval History:     No acute overnight event. Denies abd pain, tenderness or dizziness. He had x2 small BM yesterday that were dark and bloody. Hemoglobin trending up.    Continuous Infusions:   heparin (porcine) in 5 % dex       Scheduled Meds:   amLODIPine  5 mg Oral Daily    dronabinol  5 mg Oral TID    furosemide  20 mg Oral BID    lisinopril  5 mg Oral Daily    magnesium oxide  400 mg Oral BID    pantoprazole  40 mg Oral BID AC    potassium chloride  20 mEq Oral Daily    pravastatin  20 mg Oral QHS    sodium chloride 0.9%  10 mL Intravenous Q6H    warfarin  5 mg Oral Once     PRN Meds:sodium chloride, sodium chloride, acetaminophen, acetaminophen, baclofen, benzonatate, dextrose 50%, glucagon (human recombinant), hydrocodone-acetaminophen 5-325mg, ondansetron, ondansetron, polyethylene glycol, Flushing PICC Protocol **AND** sodium chloride 0.9% **AND** sodium chloride 0.9%, sodium chloride 0.9%    Review of patient's allergies indicates:   Allergen Reactions    Pneumococcal 23-killian ps vaccine      Objective:     Vital Signs (Most Recent):  Temp: 98.3 °F (36.8 °C) (03/21/18 0916)  Pulse: 84 (03/21/18 0916)  Resp: 16 (03/21/18 0916)  BP: (!) 66/0 (03/21/18 0435)  SpO2: 99 % (03/21/18 0916) Vital Signs (24h Range):  Temp:  [97.9 °F (36.6 °C)-98.9 °F (37.2 °C)] 98.3 °F (36.8 °C)  Pulse:  [] 84  Resp:  [16-17] 16  SpO2:  [97 %-99 %] 99 %  BP: (64-66)/(0) 66/0     Patient Vitals for the past 72 hrs (Last 3 readings):   Weight   03/21/18 0700 67.5 kg (148 lb 13 oz)    03/20/18 0700 68.1 kg (150 lb 2.1 oz)   03/19/18 0700 68.2 kg (150 lb 5.7 oz)     Body mass index is 21.98 kg/m².      Intake/Output Summary (Last 24 hours) at 03/21/18 1143  Last data filed at 03/21/18 0500   Gross per 24 hour   Intake              720 ml   Output             2000 ml   Net            -1280 ml       Hemodynamic Parameters:     Telemetry:     Physical Exam    Constitutional: He is oriented to person, place, and time.   HENT:   Temporal wasting   Neck: No JVD present.   Cardiovascular:   LVAD hum- smooth   Pulmonary/Chest: Effort normal and breath sounds normal.   Abdominal: Soft. Bowel sounds are normal.   Mid lower abd quad wound covered with soaked dressing but no active bleed.   Musculoskeletal: He exhibits no edema or tenderness.   Neurological: He is alert and oriented to person, place, and time.   Skin: Skin is warm and dry.   Nursing note and vitals reviewed.       Significant Labs:  CBC:    Recent Labs  Lab 03/19/18 0527 03/20/18 0428 03/21/18 0512   WBC 6.45 5.72 5.22   RBC 2.94* 2.81* 2.86*   HGB 7.7* 7.3* 7.5*   HCT 23.8* 22.8* 24.2*   * 147* 186   MCV 81* 81* 85   MCH 26.2* 26.0* 26.2*   MCHC 32.4 32.0 31.0*     BNP:    Recent Labs  Lab 03/16/18  0512 03/19/18 0527 03/21/18  0512   * 158* 126*     CMP:    Recent Labs  Lab 03/16/18 0512 03/19/18 0527 03/20/18 0428 03/21/18  0512     < > 96 94 81   CALCIUM 9.3  < > 8.6* 8.9 8.8   ALBUMIN 3.0*  --  2.8*  --  2.9*   PROT 6.1  --  5.4*  --  5.7*     < > 135* 135* 135*   K 4.1  < > 3.8 4.3 4.3   CO2 27  < > 27 28 28     < > 100 100 100   BUN 20  < > 21 25* 20   CREATININE 0.8  < > 0.9 0.8 0.8   ALKPHOS 52*  --  42*  --  48*   ALT 8*  --  6*  --  8*   AST 14  --  12  --  14   BILITOT 0.9  --  1.3*  --  0.8   < > = values in this interval not displayed.   Coagulation:     Recent Labs  Lab 03/19/18  0527  03/20/18  0428  03/20/18  1751 03/21/18  0008 03/21/18  0512   INR 1.1  --  1.0  --   --   --  1.1  "  APTT 26.9  26.9  < > 27.9  27.9  < > 25.3 24.6 25.2  25.2   < > = values in this interval not displayed.  LDH:    Recent Labs  Lab 03/19/18  0527 03/20/18  0428 03/21/18  0512    138 147     Microbiology:  Microbiology Results (last 7 days)     ** No results found for the last 168 hours. **          I have reviewed all pertinent labs within the past 24 hours.      Estimated Creatinine Clearance: 85.5 mL/min (based on SCr of 0.8 mg/dL).    Diagnostic Results:      Assessment and Plan:     No notes on file    * Anemia    - S/P Small Bowel resection 3/13/18.  POD #7  - Minor surgical site bleeding and H/H stable after transfusion at 7.5  - Continue oral iron replacement  - continue oral PPI  - continue heparin gtt bridging dose for LVAD  - Coumadin 5mg given today        Hepatitis B core antibody positive since 2012    - Appreciate Hepatology's help: "Pt has evidence of immunity to HBV and no evidence of any viremia. Given above, pt does NOT require treatment with lamivudine if he were to undergo transplantation. He should follow up with hepatology clinic if he were to undergo transplant."  - Hep B viral DNA 3/8/18 < 10          LVAD (left ventricular assist device) present    - HM III placed on 7/2017 as DT with Speed @ 5200  - INR today 1.1 coumadin 5mg given x1. Heparin at full bridging dose. No ASA  -  LDH stable and at baseline   - MAP goal of 60-80. Pulsatile. Continue PTA regimen             Ileum ulcer    - Known NSAID-induced ileal ulcer  - GEN surgery following. POD #8 s/p ilieal resection   - HB trending up  - pathology negative for malignancy but ileal ulcer          Discussed plan of care with Dr. Deric Negrete MD  Heart Transplant  [facility]  "

## 2018-03-21 NOTE — SUBJECTIVE & OBJECTIVE
Interval History:   Patient doing well, continues with slow bleed from wound  Packed with surgicel yesterday; top dressing changed twice yesterday    Medications:  Continuous Infusions:   heparin (porcine) in 5 % dex 400 Units/hr (03/20/18 1737)     Scheduled Meds:   amLODIPine  5 mg Oral Daily    dronabinol  5 mg Oral TID    furosemide  20 mg Oral BID    lisinopril  5 mg Oral Daily    magnesium oxide  400 mg Oral BID    pantoprazole  40 mg Oral BID AC    potassium chloride  20 mEq Oral Daily    pravastatin  20 mg Oral QHS    sodium chloride 0.9%  10 mL Intravenous Q6H     PRN Meds:sodium chloride, sodium chloride, acetaminophen, acetaminophen, baclofen, benzonatate, dextrose 50%, glucagon (human recombinant), hydrocodone-acetaminophen 5-325mg, ondansetron, ondansetron, polyethylene glycol, Flushing PICC Protocol **AND** sodium chloride 0.9% **AND** sodium chloride 0.9%, sodium chloride 0.9%     Review of patient's allergies indicates:   Allergen Reactions    Pneumococcal 23-killian ps vaccine      Objective:     Vital Signs (Most Recent):  Temp: 98.4 °F (36.9 °C) (03/21/18 0435)  Pulse: 78 (03/21/18 0501)  Resp: 16 (03/21/18 0435)  BP: (!) 66/0 (03/21/18 0435)  SpO2: 97 % (03/21/18 0435) Vital Signs (24h Range):  Temp:  [97.9 °F (36.6 °C)-98.9 °F (37.2 °C)] 98.4 °F (36.9 °C)  Pulse:  [] 78  Resp:  [16-18] 16  SpO2:  [97 %-99 %] 97 %  BP: (64-78)/(0) 66/0     Weight: 68.1 kg (150 lb 2.1 oz)  Body mass index is 22.17 kg/m².    Intake/Output - Last 3 Shifts       03/19 0700 - 03/20 0659 03/20 0700 - 03/21 0659    P.O. 1620 1200    Total Intake(mL/kg) 1620 (23.8) 1200 (17.6)    Urine (mL/kg/hr) 1900 (1.2) 2525 (1.5)    Stool 0 (0) 0 (0)    Total Output 1900 2525    Net -280 -1325          Urine Occurrence  2 x    Stool Occurrence 0 x 1 x          Physical Exam   Constitutional: He is oriented to person, place, and time. He appears well-developed and well-nourished. No distress.   HENT:   Head:  Normocephalic and atraumatic.   Cardiovascular: Normal rate and regular rhythm.    Pulmonary/Chest: Effort normal. No respiratory distress.   Abdominal: Soft. He exhibits no distension. There is no tenderness.   Wound open and repacked, no active bleeding when packing removed   Neurological: He is alert and oriented to person, place, and time.   Skin: Skin is warm and dry. He is not diaphoretic.       Significant Labs:  CBC:   Recent Labs  Lab 03/21/18 0512   WBC 5.22   RBC 2.86*   HGB 7.5*   HCT 24.2*      MCV 85   MCH 26.2*   MCHC 31.0*     BMP:   Recent Labs  Lab 03/21/18 0512   GLU 81   *   K 4.3      CO2 28   BUN 20   CREATININE 0.8   CALCIUM 8.8   MG 2.1     CMP:   Recent Labs  Lab 03/21/18 0512   GLU 81   CALCIUM 8.8   ALBUMIN 2.9*   PROT 5.7*   *   K 4.3   CO2 28      BUN 20   CREATININE 0.8   ALKPHOS 48*   ALT 8*   AST 14   BILITOT 0.8     LFTs:   Recent Labs  Lab 03/21/18  0512   ALT 8*   AST 14   ALKPHOS 48*   BILITOT 0.8   PROT 5.7*   ALBUMIN 2.9*     Coagulation:   Recent Labs  Lab 03/21/18 0512   LABPROT 11.1   INR 1.1   APTT 25.2  25.2

## 2018-03-21 NOTE — PROGRESS NOTES
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 Post-op course complicated by Gi bleeding had double balloon enteropscopy in 12/2017 (bleeding ulcer)  and 1/25/2018 no bleeding but ulcer found.  Retrograde DBE on 1/26/18 which was negative. He had done well for about a month with no further episodes of bleeding, and no dark stools. Then this past week he had is labs checked and was found to be anemic again. He was admitted to the hospital and did pass some melanic stool. He had a repeat upper endoscopy where several previously known about polyps were found but none actively bleeding.     Post-Op Info:  Procedure(s) (LRB):  EXPLORATORY-LAPAROTOMY with small bowel resection  (N/A)  RESECTION-BOWEL   8 Days Post-Op     Interval History:   Patient doing well, continues with slow bleed from wound  Packed with surgicel yesterday; top dressing changed twice yesterday    Medications:  Continuous Infusions:   heparin (porcine) in 5 % dex 400 Units/hr (03/20/18 9383)     Scheduled Meds:   amLODIPine  5 mg Oral Daily    dronabinol  5 mg Oral TID    furosemide  20 mg Oral BID    lisinopril  5 mg Oral Daily    magnesium oxide  400 mg Oral BID    pantoprazole  40 mg Oral BID AC    potassium chloride  20 mEq Oral Daily    pravastatin  20 mg Oral QHS    sodium chloride 0.9%  10 mL Intravenous Q6H     PRN Meds:sodium chloride, sodium chloride, acetaminophen, acetaminophen, baclofen, benzonatate, dextrose 50%, glucagon (human recombinant), hydrocodone-acetaminophen 5-325mg, ondansetron, ondansetron, polyethylene glycol, Flushing PICC Protocol **AND** sodium chloride 0.9% **AND** sodium chloride 0.9%, sodium chloride 0.9%     Review of patient's allergies indicates:   Allergen Reactions    Pneumococcal 23-killian ps vaccine      Objective:     Vital Signs (Most Recent):  Temp: 98.4 °F (36.9 °C) (03/21/18  0435)  Pulse: 78 (03/21/18 0501)  Resp: 16 (03/21/18 0435)  BP: (!) 66/0 (03/21/18 0435)  SpO2: 97 % (03/21/18 0435) Vital Signs (24h Range):  Temp:  [97.9 °F (36.6 °C)-98.9 °F (37.2 °C)] 98.4 °F (36.9 °C)  Pulse:  [] 78  Resp:  [16-18] 16  SpO2:  [97 %-99 %] 97 %  BP: (64-78)/(0) 66/0     Weight: 68.1 kg (150 lb 2.1 oz)  Body mass index is 22.17 kg/m².    Intake/Output - Last 3 Shifts       03/19 0700 - 03/20 0659 03/20 0700 - 03/21 0659    P.O. 1620 1200    Total Intake(mL/kg) 1620 (23.8) 1200 (17.6)    Urine (mL/kg/hr) 1900 (1.2) 2525 (1.5)    Stool 0 (0) 0 (0)    Total Output 1900 2525    Net -280 -1325          Urine Occurrence  2 x    Stool Occurrence 0 x 1 x          Physical Exam   Constitutional: He is oriented to person, place, and time. He appears well-developed and well-nourished. No distress.   HENT:   Head: Normocephalic and atraumatic.   Cardiovascular: Normal rate and regular rhythm.    Pulmonary/Chest: Effort normal. No respiratory distress.   Abdominal: Soft. He exhibits no distension. There is no tenderness.   Wound open and repacked, no active bleeding when packing removed   Neurological: He is alert and oriented to person, place, and time.   Skin: Skin is warm and dry. He is not diaphoretic.       Significant Labs:  CBC:   Recent Labs  Lab 03/21/18  0512   WBC 5.22   RBC 2.86*   HGB 7.5*   HCT 24.2*      MCV 85   MCH 26.2*   MCHC 31.0*     BMP:   Recent Labs  Lab 03/21/18  0512   GLU 81   *   K 4.3      CO2 28   BUN 20   CREATININE 0.8   CALCIUM 8.8   MG 2.1     CMP:   Recent Labs  Lab 03/21/18  0512   GLU 81   CALCIUM 8.8   ALBUMIN 2.9*   PROT 5.7*   *   K 4.3   CO2 28      BUN 20   CREATININE 0.8   ALKPHOS 48*   ALT 8*   AST 14   BILITOT 0.8     LFTs:   Recent Labs  Lab 03/21/18  0512   ALT 8*   AST 14   ALKPHOS 48*   BILITOT 0.8   PROT 5.7*   ALBUMIN 2.9*     Coagulation:   Recent Labs  Lab 03/21/18  0512   LABPROT 11.1   INR 1.1   APTT 25.2  25.2      Assessment/Plan:     Ileum ulcer    S/p ex-lap with SBR on 3/13/18    continue cardiac diet  Continue anticoagulation per primary team;  wound repacked with gauze this AM  transfuse as neccessary  Continue LVAD management per primary team    Will continue to follow along            Ludmila Rogers PA-C   [id number]  General Surgery  [facility]

## 2018-03-21 NOTE — PLAN OF CARE
Problem: Patient Care Overview  Goal: Plan of Care Review  Outcome: Ongoing (interventions implemented as appropriate)  Patient remains free of falls or injury. Patient denies pain. S/p exploratory lap and bowel resection on 3/13/18; stepped down to CSU on 3/14/18. MS incision with staples; packed and dressed per surgery. Continued on heparin drip; aPTTs q6 hours. Current INR 1.0. Current H&H 7.3/22.8. Plan of care reviewed with patient and wife.

## 2018-03-22 PROBLEM — T14.8XXA WOUND DRAINAGE: Status: ACTIVE | Noted: 2018-01-01

## 2018-03-22 NOTE — ASSESSMENT & PLAN NOTE
- HM III placed on 7/2017 as DT with Speed @ 5200  - INR today 1.1 coumadin at home dose. Heparin at full bridging dose. No ASA  - LDH stable and at baseline   - MAP goal of 60-80. Pulsatile. Continue PTA regimen

## 2018-03-22 NOTE — PROGRESS NOTES
" [facility]  Adult Nutrition  Progress Note    SUMMARY       Recommendations    Recommendation/Intervention:   1. Continue current cardiac diet with Boost Plus. Pt with good intake at this time.   2. RD following.    Goals: Intake >/=85% EEN/EPN  Nutrition Goal Status: goal met  Communication of RD Recs:  (POC)    Reason for Assessment    Reason for Assessment: RD follow-up  Diagnosis: gastrointestinal disease (anemia)  Relevant Medical History: CHF, CAD, HTN, hyperlipidemia, LVAD placement (7/2017), gastric polyp, s/p AICD  Interdisciplinary Rounds: attended    General Information Comments: Pt reports good appetite at this time, eating % of meals with Boost. PAB trending up.    Nutrition Discharge Planning: Adequate PO intake to prevent further weight loss on cardiac diet    Nutrition Risk Screen    Nutrition Risk Screen: no indicators present    Nutrition/Diet History    Do you have any cultural, spiritual, Oriental orthodox conflicts, given your current situation?: none reported   Supplemental Drinks or Food Habits: Boost Plus (2/day)  Factors Affecting Nutritional Intake: None identified at this time    Anthropometrics    Temp: 97.7 °F (36.5 °C)  Height Method: Stated  Height: 5' 9" (175.3 cm)  Height (inches): 69 in  Weight Method: Standard Scale  Weight: 67 kg (147 lb 11.3 oz)  Weight (lb): 147.71 lb  Ideal Body Weight (IBW), Male: 160 lb  % Ideal Body Weight, Male (lb): 92.32 lb  BMI (Calculated): 21.9  BMI Grade: 18.5-24.9 - normal  Usual Body Weight (UBW), kg:  (generally trending up per chart review)    Lab/Procedures/Meds    Pertinent Labs Reviewed: reviewed  Pertinent Labs Comments: Na 133, BUN 26  Pertinent Medications Reviewed: reviewed  Pertinent Medications Comments: Marinol, lasix, Mg, pantoprazole, KCl, statin, warfarin, heparin    Physical Findings/Assessment    Overall Physical Appearance: loss of subcutaneous fat, loss of muscle mass  Oral/Mouth Cavity: endentulous (with " "dentures at time of visit)  Skin: incision(s) (abdomen)    Estimated/Assessed Needs    Weight Used For Calorie Calculations: 70.9 kg (156 lb 4.9 oz)  Height: 5' 9" (175.3 cm)  Energy Calorie Requirements (kcal): 1843  Energy Need Method: Waller-St Jeor (x 1.25 (PAL))  RMR (Waller-St. Jeor Equation): 1474.38  Protein Requirements:  gm (1.2-1.4 gm/kg)  Weight Used For Protein Calculations: 70.9 kg (156 lb 4.9 oz)  Fluid Requirements (mL): per MD  RDA Method (mL): 1843     Nutrition Prescription Ordered    Current Diet Order: Cardiac  Oral Nutrition Supplement: Boost Plus (2/meal)    Evaluation of Received Nutrient/Fluid Intake    I/O: -1L  Comments: LBM 3/20  % Intake of Estimated Energy Needs: 75 - 100 %  % Meal Intake: 75 - 100 %    Nutrition Risk    Level of Risk/Frequency of Follow-up:  (F/u 1x weekly)     Assessment and Plan    Ileum ulcer    Contributing Nutrition Diagnosis  Increased protein needs    Related to (etiology):   Increased demand for healing    Signs and Symptoms (as evidenced by):   S/p exlab with small bowel resection 3/13     Interventions/Recommendations (treatment strategy):  See recs.    Nutrition Diagnosis Status:   Continues             Monitor and Evaluation    Food and Nutrient Intake: energy intake, food and beverage intake  Food and Nutrient Adminstration: diet order  Physical Activity and Function: nutrition-related ADLs and IADLs  Anthropometric Measurements: weight, weight change, body mass index  Biochemical Data, Medical Tests and Procedures: electrolyte and renal panel, gastrointestinal profile, glucose/endocrine profile, inflammatory profile  Nutrition-Focused Physical Findings: overall appearance     Nutrition Follow-Up    RD Follow-up?: Yes    "

## 2018-03-22 NOTE — PROGRESS NOTES
[facility]  Heart Transplant  Progress Note    Patient Name: Suman Hayden  MRN: [id number]  Admission Date: 3/5/2018  Hospital Length of Stay: 17 days  Attending Physician: Ortega Leos MD  Primary Care Provider: Joe Ernst MD  Principal Problem:Anemia    Subjective:     Interval History:     No acute overnight or ongoing event. No BM for the last 24hrs. Denies dizziness or chest pain. Surgery have signed off for now.    Continuous Infusions:   heparin (porcine) in D5W 15 Units/kg/hr (03/22/18 0247)     Scheduled Meds:   amLODIPine  5 mg Oral Daily    dronabinol  5 mg Oral TID    furosemide  20 mg Oral BID    lisinopril  5 mg Oral Daily    magnesium oxide  400 mg Oral BID    pantoprazole  40 mg Oral BID AC    potassium chloride  20 mEq Oral Daily    pravastatin  20 mg Oral QHS     PRN Meds:acetaminophen, baclofen, benzonatate, hydrocodone-acetaminophen 5-325mg, ondansetron, ondansetron, polyethylene glycol    Review of patient's allergies indicates:   Allergen Reactions    Pneumococcal 23-killian ps vaccine      Objective:     Vital Signs (Most Recent):  Temp: 97.7 °F (36.5 °C) (03/22/18 1130)  Pulse: 89 (03/22/18 1130)  Resp: 18 (03/22/18 1130)  BP: (!) 68/0 (03/22/18 0800)  SpO2: 97 % (03/22/18 1130) Vital Signs (24h Range):  Temp:  [97.7 °F (36.5 °C)-99 °F (37.2 °C)] 97.7 °F (36.5 °C)  Pulse:  [62-89] 89  Resp:  [16-18] 18  SpO2:  [97 %-99 %] 97 %  BP: (62-88)/(0-52) 68/0     Patient Vitals for the past 72 hrs (Last 3 readings):   Weight   03/22/18 0726 67 kg (147 lb 11.3 oz)   03/21/18 0700 67.5 kg (148 lb 13 oz)   03/20/18 0700 68.1 kg (150 lb 2.1 oz)     Body mass index is 21.81 kg/m².      Intake/Output Summary (Last 24 hours) at 03/22/18 1433  Last data filed at 03/22/18 0500   Gross per 24 hour   Intake              240 ml   Output             1350 ml   Net            -1110 ml       Hemodynamic Parameters:       Telemetry:     Physical Exam     Constitutional: He is  oriented to person, place, and time.   HENT:   Temporal wasting   Neck: No JVD present.   Cardiovascular:   LVAD hum- smooth   Pulmonary/Chest: Effort normal and breath sounds normal.   Abdominal: Soft. Bowel sounds are normal.   Mid lower abd quad wound covered with dry dressing. Packing not removed  Musculoskeletal: He exhibits no edema or tenderness.   Neurological: He is alert and oriented to person, place, and time.   Skin: Skin is warm and dry.   Nursing note and vitals reviewed.         Significant Labs:  CBC:    Recent Labs  Lab 03/20/18 0428 03/21/18  0512 03/22/18  0301   WBC 5.72 5.22 6.94   RBC 2.81* 2.86* 2.71*   HGB 7.3* 7.5* 7.1*   HCT 22.8* 24.2* 22.9*   * 186 201   MCV 81* 85 85   MCH 26.0* 26.2* 26.2*   MCHC 32.0 31.0* 31.0*     BNP:    Recent Labs  Lab 03/16/18 0512 03/19/18  0527 03/21/18  0512   * 158* 126*     CMP:    Recent Labs  Lab 03/16/18 0512 03/19/18  0527 03/20/18 0428 03/21/18  0512 03/22/18  0301     < > 96 94 81 90   CALCIUM 9.3  < > 8.6* 8.9 8.8 9.0   ALBUMIN 3.0*  --  2.8*  --  2.9*  --    PROT 6.1  --  5.4*  --  5.7*  --      < > 135* 135* 135* 133*   K 4.1  < > 3.8 4.3 4.3 4.4   CO2 27  < > 27 28 28 27     < > 100 100 100 100   BUN 20  < > 21 25* 20 26*   CREATININE 0.8  < > 0.9 0.8 0.8 0.9   ALKPHOS 52*  --  42*  --  48*  --    ALT 8*  --  6*  --  8*  --    AST 14  --  12  --  14  --    BILITOT 0.9  --  1.3*  --  0.8  --    < > = values in this interval not displayed.   Coagulation:     Recent Labs  Lab 03/20/18 0428 03/21/18  0512  03/21/18  1753 03/22/18  0301 03/22/18  0708 03/22/18  1325   INR 1.0  --  1.1  --   --   --  1.1  --    APTT 27.9  27.9  < > 25.2  25.2  < > 39.5* 50.0*  --  54.7*   < > = values in this interval not displayed.  LDH:    Recent Labs  Lab 03/20/18  0428 03/21/18  0512 03/22/18  0301    147 157     Microbiology:  Microbiology Results (last 7 days)     ** No results found for the last 168 hours. **     "      I have reviewed all pertinent labs within the past 24 hours.    Estimated Creatinine Clearance: 75.5 mL/min (based on SCr of 0.9 mg/dL).    Diagnostic Results:          Assessment and Plan:     No notes on file    * Anemia    - S/P Small Bowel resection 3/13/18.  POD #9  - Minor surgical site bleeding and H/H stable after transfusion at 7.1  - will check anemia labs and consider iron infusion during stay  - Continue current oral iron replacement  - continue oral PPI  - continue heparin gtt bridging dose for LVAD  - Coumadin at home dose resumed        Hepatitis B core antibody positive since 2012    - Appreciate Hepatology's help: "Pt has evidence of immunity to HBV and no evidence of any viremia. Given above, pt does NOT require treatment with lamivudine if he were to undergo transplantation. He should follow up with hepatology clinic if he were to undergo transplant."  - Hep B viral DNA 3/8/18 < 10          LVAD (left ventricular assist device) present    - HM III placed on 7/2017 as DT with Speed @ 5200  - INR today 1.1 coumadin at home dose. Heparin at full bridging dose. No ASA  - LDH stable and at baseline   - MAP goal of 60-80. Pulsatile. Continue PTA regimen             Ileum ulcer    - Known NSAID-induced ileal ulcer  - POD #9 s/p ilieal resection   - Hb at 7.1 today from 7.5 yesterday. No acute GI bleed noted  -  Will do Iron profile labs and consider iron infusion if necessary  - General surgery have signed off. Will ask of their plan on post d/c clinic          Discussed plan of care with Dr. Deric Negrete MD  Heart Transplant  [facility]  "

## 2018-03-22 NOTE — ASSESSMENT & PLAN NOTE
Contributing Nutrition Diagnosis  Increased protein needs    Related to (etiology):   Increased demand for healing    Signs and Symptoms (as evidenced by):   S/p exlab with small bowel resection 3/13     Interventions/Recommendations (treatment strategy):  See recs.    Nutrition Diagnosis Status:   Continues

## 2018-03-22 NOTE — PLAN OF CARE
Problem: Patient Care Overview  Goal: Plan of Care Review  Outcome: Ongoing (interventions implemented as appropriate)  Patient progressing toward all goals. Plan of care discussed with patient, no questions at this time. Patient ambulating independently, fall precautions in place. Heparin infusing; Dressing to abdomin from surgery changed one time (packed with a 4x4 the places a small abd pad with tape); pt walking all over the floor visiting other LVAD patients; He seems happy and content with meeting the new patients and explaining how pleased he is with his LVAD experience; VS & LVAD numbers WNL. Will monitor.

## 2018-03-22 NOTE — ASSESSMENT & PLAN NOTE
- Known NSAID-induced ileal ulcer  - GEN surgery following. POD #9 s/p ilieal resection   - Hb at 7.1 today from 7.5 yesterday. No acute GI bleed noted  -  Will do Iron profile labs and consider iron infusion if necessary  - General surgery have signed off. Will ask of their plan on post d/c clinic

## 2018-03-22 NOTE — SUBJECTIVE & OBJECTIVE
Interval History:     No acute overnight or ongoing event. No BM for the last 24hrs. Denies dizziness or chest pain. Surgery have signed off for now.    Continuous Infusions:   heparin (porcine) in D5W 15 Units/kg/hr (03/22/18 0247)     Scheduled Meds:   amLODIPine  5 mg Oral Daily    dronabinol  5 mg Oral TID    furosemide  20 mg Oral BID    lisinopril  5 mg Oral Daily    magnesium oxide  400 mg Oral BID    pantoprazole  40 mg Oral BID AC    potassium chloride  20 mEq Oral Daily    pravastatin  20 mg Oral QHS     PRN Meds:acetaminophen, baclofen, benzonatate, hydrocodone-acetaminophen 5-325mg, ondansetron, ondansetron, polyethylene glycol    Review of patient's allergies indicates:   Allergen Reactions    Pneumococcal 23-killian ps vaccine      Objective:     Vital Signs (Most Recent):  Temp: 97.7 °F (36.5 °C) (03/22/18 1130)  Pulse: 89 (03/22/18 1130)  Resp: 18 (03/22/18 1130)  BP: (!) 68/0 (03/22/18 0800)  SpO2: 97 % (03/22/18 1130) Vital Signs (24h Range):  Temp:  [97.7 °F (36.5 °C)-99 °F (37.2 °C)] 97.7 °F (36.5 °C)  Pulse:  [62-89] 89  Resp:  [16-18] 18  SpO2:  [97 %-99 %] 97 %  BP: (62-88)/(0-52) 68/0     Patient Vitals for the past 72 hrs (Last 3 readings):   Weight   03/22/18 0726 67 kg (147 lb 11.3 oz)   03/21/18 0700 67.5 kg (148 lb 13 oz)   03/20/18 0700 68.1 kg (150 lb 2.1 oz)     Body mass index is 21.81 kg/m².      Intake/Output Summary (Last 24 hours) at 03/22/18 1433  Last data filed at 03/22/18 0500   Gross per 24 hour   Intake              240 ml   Output             1350 ml   Net            -1110 ml       Hemodynamic Parameters:       Telemetry:     Physical Exam     Constitutional: He is oriented to person, place, and time.   HENT:   Temporal wasting   Neck: No JVD present.   Cardiovascular:   LVAD hum- smooth   Pulmonary/Chest: Effort normal and breath sounds normal.   Abdominal: Soft. Bowel sounds are normal.   Mid lower abd quad wound covered with dry dressing. Packing not  removed  Musculoskeletal: He exhibits no edema or tenderness.   Neurological: He is alert and oriented to person, place, and time.   Skin: Skin is warm and dry.   Nursing note and vitals reviewed.         Significant Labs:  CBC:    Recent Labs  Lab 03/20/18 0428 03/21/18 0512 03/22/18  0301   WBC 5.72 5.22 6.94   RBC 2.81* 2.86* 2.71*   HGB 7.3* 7.5* 7.1*   HCT 22.8* 24.2* 22.9*   * 186 201   MCV 81* 85 85   MCH 26.0* 26.2* 26.2*   MCHC 32.0 31.0* 31.0*     BNP:    Recent Labs  Lab 03/16/18 0512 03/19/18 0527 03/21/18  0512   * 158* 126*     CMP:    Recent Labs  Lab 03/16/18 0512 03/19/18 0527 03/20/18 0428 03/21/18 0512 03/22/18  0301     < > 96 94 81 90   CALCIUM 9.3  < > 8.6* 8.9 8.8 9.0   ALBUMIN 3.0*  --  2.8*  --  2.9*  --    PROT 6.1  --  5.4*  --  5.7*  --      < > 135* 135* 135* 133*   K 4.1  < > 3.8 4.3 4.3 4.4   CO2 27  < > 27 28 28 27     < > 100 100 100 100   BUN 20  < > 21 25* 20 26*   CREATININE 0.8  < > 0.9 0.8 0.8 0.9   ALKPHOS 52*  --  42*  --  48*  --    ALT 8*  --  6*  --  8*  --    AST 14  --  12  --  14  --    BILITOT 0.9  --  1.3*  --  0.8  --    < > = values in this interval not displayed.   Coagulation:     Recent Labs  Lab 03/20/18 0428 03/21/18  0512 03/21/18  1753 03/22/18  0301 03/22/18  0708 03/22/18  1325   INR 1.0  --  1.1  --   --   --  1.1  --    APTT 27.9  27.9  < > 25.2  25.2  < > 39.5* 50.0*  --  54.7*   < > = values in this interval not displayed.  LDH:    Recent Labs  Lab 03/20/18  0428 03/21/18  0512 03/22/18  0301    147 157     Microbiology:  Microbiology Results (last 7 days)     ** No results found for the last 168 hours. **          I have reviewed all pertinent labs within the past 24 hours.    Estimated Creatinine Clearance: 75.5 mL/min (based on SCr of 0.9 mg/dL).    Diagnostic Results:

## 2018-03-22 NOTE — PLAN OF CARE
Problem: Patient Care Overview  Goal: Plan of Care Review    Recommendations     Recommendation/Intervention:   1. Continue current cardiac diet with Boost Plus. Pt with good intake at this time.   2. RD following.     Goals: Intake >/=85% EEN/EPN  Nutrition Goal Status: goal met

## 2018-03-22 NOTE — ASSESSMENT & PLAN NOTE
- S/P Small Bowel resection 3/13/18.  POD #9  - Minor surgical site bleeding and H/H stable after transfusion at 7.1  - will check anemia labs and consider iron infusion during stay  - Continue current oral iron replacement  - continue oral PPI  - continue heparin gtt bridging dose for LVAD  - Coumadin at home dose resumed

## 2018-03-22 NOTE — PLAN OF CARE
Problem: Patient Care Overview  Goal: Plan of Care Review  Outcome: Ongoing (interventions implemented as appropriate)  Patient remains free from falls and injuries through out shift. Patient AAO and VSS. Patient denies chest pain and SOB. Patient's wife at bedside. Patient is pulsatile. Pt started on weight based heparin drip at 15units/kg/hr. INR 1.1 and H&H 7.5/24. Plan of care reviewed with patient. Patient verbalizes understanding of plan.  Will continue to monitor.

## 2018-03-23 NOTE — PROGRESS NOTES
[facility]  Heart Transplant  Progress Note    Patient Name: Suman Hayden  MRN: [id number]  Admission Date: 3/5/2018  Hospital Length of Stay: 18 days  Attending Physician: Ortega Leos MD  Primary Care Provider: Joe Ernst MD  Principal Problem:Anemia    Subjective:     Interval History:     No acute overnight events. Denies Bm, dizziness, chest pain or surgical site bleed. Hb dropped to 6.3.    Continuous Infusions:   heparin (porcine) in D5W 15 Units/kg/hr (03/23/18 0430)     Scheduled Meds:   amLODIPine  5 mg Oral Daily    dronabinol  5 mg Oral TID    furosemide  20 mg Oral BID    lisinopril  5 mg Oral Daily    magnesium oxide  400 mg Oral BID    pantoprazole  40 mg Oral BID AC    potassium chloride  20 mEq Oral Daily    pravastatin  20 mg Oral QHS    warfarin  7.5 mg Oral Daily     PRN Meds:sodium chloride, acetaminophen, baclofen, benzonatate, hydrocodone-acetaminophen 5-325mg, ondansetron, ondansetron, polyethylene glycol    Review of patient's allergies indicates:   Allergen Reactions    Pneumococcal 23-killian ps vaccine      Objective:     Vital Signs (Most Recent):  Temp: 97.6 °F (36.4 °C) (03/23/18 0742)  Pulse: 81 (03/23/18 0742)  Resp: 18 (03/23/18 0742)  BP: (!) 83/45 (03/23/18 0437)  SpO2: 99 % (03/23/18 0742) Vital Signs (24h Range):  Temp:  [96.9 °F (36.1 °C)-97.8 °F (36.6 °C)] 97.6 °F (36.4 °C)  Pulse:  [69-89] 81  Resp:  [16-18] 18  SpO2:  [95 %-99 %] 99 %  BP: (62-83)/(0-45) 83/45     Patient Vitals for the past 72 hrs (Last 3 readings):   Weight   03/23/18 0709 67.5 kg (148 lb 13 oz)   03/22/18 0726 67 kg (147 lb 11.3 oz)   03/21/18 0700 67.5 kg (148 lb 13 oz)     Body mass index is 21.98 kg/m².      Intake/Output Summary (Last 24 hours) at 03/23/18 1106  Last data filed at 03/23/18 0900   Gross per 24 hour   Intake             1750 ml   Output             2575 ml   Net             -825 ml       Hemodynamic Parameters:       Telemetry:    Physical Exam      Constitutional: He is oriented to person, place, and time.   HENT:   Temporal wasting   Neck: No JVD present.   Cardiovascular:   LVAD hum- smooth   Pulmonary/Chest: Effort normal and breath sounds normal.   Abdominal: Soft. Bowel sounds are normal.   Mid lower abd quad wound covered with dry dressing. Nurse changing the dressing.  Musculoskeletal: He exhibits no edema or tenderness.   Neurological: He is alert and oriented to person, place, and time.   Skin: Skin is warm and dry.   Nursing note and vitals reviewed.    Significant Labs:  CBC:    Recent Labs  Lab 03/21/18  0512 03/22/18  0301 03/23/18  0345   WBC 5.22 6.94 6.08   RBC 2.86* 2.71* 2.60*   HGB 7.5* 7.1* 6.8*   HCT 24.2* 22.9* 22.2*    201 205   MCV 85 85 85   MCH 26.2* 26.2* 26.2*   MCHC 31.0* 31.0* 30.6*     BNP:    Recent Labs  Lab 03/19/18  0527 03/21/18  0512 03/23/18  0345   * 126* 109*     CMP:    Recent Labs  Lab 03/19/18  0527  03/21/18  0512 03/22/18  0301 03/23/18  0345   GLU 96  < > 81 90 86   CALCIUM 8.6*  < > 8.8 9.0 8.5*   ALBUMIN 2.8*  --  2.9*  --  2.9*   PROT 5.4*  --  5.7*  --  5.7*   *  < > 135* 133* 136   K 3.8  < > 4.3 4.4 4.2   CO2 27  < > 28 27 26     < > 100 100 102   BUN 21  < > 20 26* 23   CREATININE 0.9  < > 0.8 0.9 1.0   ALKPHOS 42*  --  48*  --  58   ALT 6*  --  8*  --  9*   AST 12  --  14  --  15   BILITOT 1.3*  --  0.8  --  0.5   < > = values in this interval not displayed.   Coagulation:     Recent Labs  Lab 03/21/18  0512  03/22/18  0301 03/22/18  0708 03/22/18  1325 03/23/18  0345   INR 1.1  --   --  1.1  --  1.2  1.2   APTT 25.2  25.2  < > 50.0*  --  54.7* 54.2*   < > = values in this interval not displayed.  LDH:    Recent Labs  Lab 03/21/18  0512 03/22/18  0301 03/23/18  0345    157 138     Microbiology:  Microbiology Results (last 7 days)     ** No results found for the last 168 hours. **          I have reviewed all pertinent labs within the past 24 hours.    Estimated  "Creatinine Clearance: 68.4 mL/min (based on SCr of 1 mg/dL).    Diagnostic Results:        Assessment and Plan:     No notes on file    * Anemia    - S/P Small Bowel resection 3/13/18.  POD #10  - Minor surgical site bleeding and H/H trending down to 6.8 from 7.1  - Iron sat low (8)  - Iron infusion x1. Pharmacy to dose  - Continue current oral iron replacement  - continue oral PPI  - continue heparin gtt bridging dose for LVAD  - Coumadin give 7.5mg today.        Hepatitis B core antibody positive since 2012    - Appreciate Hepatology's help: "Pt has evidence of immunity to HBV and no evidence of any viremia. Given above, pt does NOT require treatment with lamivudine if he were to undergo transplantation. He should follow up with hepatology clinic if he were to undergo transplant."  - Hep B viral DNA 3/8/18 < 10          LVAD (left ventricular assist device) present    - HM III placed on 7/2017 as DT with Speed @ 5200  - INR today 1.2. Coumadin 7.5 mg given today. Heparin at full bridging dose. No ASA  - LDH stable and at baseline   - MAP goal of 60-80. Pulsatile. Continue PTA regimen             Ileum ulcer    - S/P resection. No malignancy  - Gen surgery have signed off. Dressing per nursing staff   - General surgery have signed off. Will ask of their plan on post d/c clinic          Discussed plan with Dr. Deric Negrete MD  Heart Transplant  [facility]  "

## 2018-03-23 NOTE — PLAN OF CARE
Problem: Patient Care Overview  Goal: Plan of Care Review  Outcome: Ongoing (interventions implemented as appropriate)  Patient remains free from falls and injuries through out shift. Patient AAO and VSS. Patient denies chest pain and SOB. Abdomen dressing site clean, dry and intact. VAD functioning WNL. Patient's wife at bedside. Patient continues on weight based Heparin 15units/kg/hr. INR 1.1 and aPTT 54.7. Plan of care reviewed with patient. Patient verbalizes understanding of plan.  Will continue to monitor.

## 2018-03-23 NOTE — SUBJECTIVE & OBJECTIVE
Interval History:     No acute overnight events. Denies Bm, dizziness, chest pain or surgical site bleed. Hb dropped to 6.3.    Continuous Infusions:   heparin (porcine) in D5W 15 Units/kg/hr (03/23/18 0430)     Scheduled Meds:   amLODIPine  5 mg Oral Daily    dronabinol  5 mg Oral TID    furosemide  20 mg Oral BID    lisinopril  5 mg Oral Daily    magnesium oxide  400 mg Oral BID    pantoprazole  40 mg Oral BID AC    potassium chloride  20 mEq Oral Daily    pravastatin  20 mg Oral QHS    warfarin  7.5 mg Oral Daily     PRN Meds:sodium chloride, acetaminophen, baclofen, benzonatate, hydrocodone-acetaminophen 5-325mg, ondansetron, ondansetron, polyethylene glycol    Review of patient's allergies indicates:   Allergen Reactions    Pneumococcal 23-killian ps vaccine      Objective:     Vital Signs (Most Recent):  Temp: 97.6 °F (36.4 °C) (03/23/18 0742)  Pulse: 81 (03/23/18 0742)  Resp: 18 (03/23/18 0742)  BP: (!) 83/45 (03/23/18 0437)  SpO2: 99 % (03/23/18 0742) Vital Signs (24h Range):  Temp:  [96.9 °F (36.1 °C)-97.8 °F (36.6 °C)] 97.6 °F (36.4 °C)  Pulse:  [69-89] 81  Resp:  [16-18] 18  SpO2:  [95 %-99 %] 99 %  BP: (62-83)/(0-45) 83/45     Patient Vitals for the past 72 hrs (Last 3 readings):   Weight   03/23/18 0709 67.5 kg (148 lb 13 oz)   03/22/18 0726 67 kg (147 lb 11.3 oz)   03/21/18 0700 67.5 kg (148 lb 13 oz)     Body mass index is 21.98 kg/m².      Intake/Output Summary (Last 24 hours) at 03/23/18 1106  Last data filed at 03/23/18 0900   Gross per 24 hour   Intake             1750 ml   Output             2575 ml   Net             -825 ml       Hemodynamic Parameters:       Telemetry:    Physical Exam     Constitutional: He is oriented to person, place, and time.   HENT:   Temporal wasting   Neck: No JVD present.   Cardiovascular:   LVAD hum- smooth   Pulmonary/Chest: Effort normal and breath sounds normal.   Abdominal: Soft. Bowel sounds are normal.   Mid lower abd quad wound covered with dry dressing.  Nurse changing the dressing.  Musculoskeletal: He exhibits no edema or tenderness.   Neurological: He is alert and oriented to person, place, and time.   Skin: Skin is warm and dry.   Nursing note and vitals reviewed.    Significant Labs:  CBC:    Recent Labs  Lab 03/21/18 0512 03/22/18 0301 03/23/18  0345   WBC 5.22 6.94 6.08   RBC 2.86* 2.71* 2.60*   HGB 7.5* 7.1* 6.8*   HCT 24.2* 22.9* 22.2*    201 205   MCV 85 85 85   MCH 26.2* 26.2* 26.2*   MCHC 31.0* 31.0* 30.6*     BNP:    Recent Labs  Lab 03/19/18 0527 03/21/18 0512 03/23/18  0345   * 126* 109*     CMP:    Recent Labs  Lab 03/19/18 0527 03/21/18  0512 03/22/18 0301 03/23/18  0345   GLU 96  < > 81 90 86   CALCIUM 8.6*  < > 8.8 9.0 8.5*   ALBUMIN 2.8*  --  2.9*  --  2.9*   PROT 5.4*  --  5.7*  --  5.7*   *  < > 135* 133* 136   K 3.8  < > 4.3 4.4 4.2   CO2 27  < > 28 27 26     < > 100 100 102   BUN 21  < > 20 26* 23   CREATININE 0.9  < > 0.8 0.9 1.0   ALKPHOS 42*  --  48*  --  58   ALT 6*  --  8*  --  9*   AST 12  --  14  --  15   BILITOT 1.3*  --  0.8  --  0.5   < > = values in this interval not displayed.   Coagulation:     Recent Labs  Lab 03/21/18 0512 03/22/18 0301 03/22/18  0708 03/22/18  1325 03/23/18  0345   INR 1.1  --   --  1.1  --  1.2  1.2   APTT 25.2  25.2  < > 50.0*  --  54.7* 54.2*   < > = values in this interval not displayed.  LDH:    Recent Labs  Lab 03/21/18 0512 03/22/18  0301 03/23/18  0345    157 138     Microbiology:  Microbiology Results (last 7 days)     ** No results found for the last 168 hours. **          I have reviewed all pertinent labs within the past 24 hours.    Estimated Creatinine Clearance: 68.4 mL/min (based on SCr of 1 mg/dL).    Diagnostic Results:

## 2018-03-23 NOTE — ASSESSMENT & PLAN NOTE
- S/P Small Bowel resection 3/13/18.  POD #10  - Minor surgical site bleeding and H/H trending down to 6.8 from 7.1  - Iron sat low (8)  - Iron infusion x1. Pharmacy to dose  - Continue current oral iron replacement  - continue oral PPI  - continue heparin gtt bridging dose for LVAD  - Coumadin give 7.5mg today.

## 2018-03-23 NOTE — PROGRESS NOTES
Patient morning labs reviewed. H&H 6.8/22.2. Notified Idalia PARIKH no new orders at this time. Will continue to monitor.

## 2018-03-23 NOTE — ASSESSMENT & PLAN NOTE
- HM III placed on 7/2017 as DT with Speed @ 5200  - INR today 1.2. Coumadin 7.5 mg given today. Heparin at full bridging dose. No ASA  - LDH stable and at baseline   - MAP goal of 60-80. Pulsatile. Continue PTA regimen

## 2018-03-23 NOTE — PLAN OF CARE
Problem: Patient Care Overview  Goal: Plan of Care Review  Outcome: Ongoing (interventions implemented as appropriate)  Patient progressing toward all goals. Plan of care discussed with patient, no questions at this time. Patient ambulating independently, fall precautions in place. H & H dropped to 6.8 & 22.2; Pt states his bowel movements are becoming less bloody and more formed; VS & LVAD numbers WNL. Will monitor.

## 2018-03-23 NOTE — ASSESSMENT & PLAN NOTE
- S/P resection. No malignancy  - Gen surgery have signed off. Dressing per nursing staff   - General surgery have signed off. Will ask of their plan on post d/c clinic

## 2018-03-24 NOTE — PLAN OF CARE
Plan of care discussed with patient. Fall precautions in place. Patient has no complaints of pain. Heparin gtt continued. Discussed medications and care. Patient has no questions at this time.White board updated. Bed locked in lowest position. Side rails up x2. Will continue to monitor.

## 2018-03-24 NOTE — SUBJECTIVE & OBJECTIVE
Interval History:     No acute overnight event. Had a normal BM last night. Hemoglobin trending up to 7.3 after iron infusion. INR trending up to 1.5    Continuous Infusions:   heparin (porcine) in 5 % dex 15 Units/kg/hr (03/24/18 0602)     Scheduled Meds:   ALPRAZolam  0.5 mg Oral Once    amLODIPine  5 mg Oral Daily    dronabinol  5 mg Oral TID    furosemide  20 mg Oral BID    lisinopril  5 mg Oral Daily    magnesium oxide  400 mg Oral BID    pantoprazole  40 mg Oral BID AC    potassium chloride  20 mEq Oral Daily    pravastatin  20 mg Oral QHS    warfarin  5 mg Oral Once     PRN Meds:acetaminophen, baclofen, benzonatate, hydrocodone-acetaminophen 5-325mg, ondansetron, ondansetron, polyethylene glycol    Review of patient's allergies indicates:   Allergen Reactions    Pneumococcal 23-killian ps vaccine      Objective:     Vital Signs (Most Recent):  Temp: 98 °F (36.7 °C) (03/24/18 1126)  Pulse: 75 (03/24/18 1000)  Resp: 18 (03/24/18 1126)  BP: (!) 86/50 (03/24/18 0913)  SpO2: 98 % (03/24/18 0913) Vital Signs (24h Range):  Temp:  [96.7 °F (35.9 °C)-98.4 °F (36.9 °C)] 98 °F (36.7 °C)  Pulse:  [75-95] 75  Resp:  [16-18] 18  SpO2:  [95 %-99 %] 98 %  BP: (62-88)/(0-63) 86/50     Patient Vitals for the past 72 hrs (Last 3 readings):   Weight   03/24/18 0700 66.1 kg (145 lb 11.6 oz)   03/23/18 0709 67.5 kg (148 lb 13 oz)   03/22/18 0726 67 kg (147 lb 11.3 oz)     Body mass index is 21.52 kg/m².      Intake/Output Summary (Last 24 hours) at 03/24/18 1202  Last data filed at 03/24/18 0900   Gross per 24 hour   Intake              520 ml   Output             5150 ml   Net            -4630 ml       Hemodynamic Parameters:       Telemetry:     Physical Exam     Constitutional: He is oriented to person, place, and time.   HENT:   Temporal wasting   Neck: No JVD present.   Cardiovascular:   LVAD hum- smooth   Pulmonary/Chest: Effort normal and breath sounds normal.   Abdominal: Soft. Bowel sounds are normal.   Mid lower  abd quad wound covered with dry dressing. Nurse changing the dressing.  Musculoskeletal: He exhibits no edema or tenderness.   Neurological: He is alert and oriented to person, place, and time.   Skin: Skin is warm and dry.   Nursing note and vitals reviewed.    Significant Labs:  CBC:    Recent Labs  Lab 03/22/18 0301 03/23/18 0345 03/24/18  0610   WBC 6.94 6.08 5.49   RBC 2.71* 2.60* 2.84*   HGB 7.1* 6.8* 7.3*   HCT 22.9* 22.2* 24.1*    205 220   MCV 85 85 85   MCH 26.2* 26.2* 25.7*   MCHC 31.0* 30.6* 30.3*     BNP:    Recent Labs  Lab 03/19/18  0527 03/21/18  0512 03/23/18  0345   * 126* 109*     CMP:    Recent Labs  Lab 03/19/18  0527 03/21/18  0512 03/22/18 0301 03/23/18  0345 03/24/18  0610   GLU 96  < > 81 90 86 83   CALCIUM 8.6*  < > 8.8 9.0 8.5* 9.1   ALBUMIN 2.8*  --  2.9*  --  2.9*  --    PROT 5.4*  --  5.7*  --  5.7*  --    *  < > 135* 133* 136 135*   K 3.8  < > 4.3 4.4 4.2 4.3   CO2 27  < > 28 27 26 27     < > 100 100 102 100   BUN 21  < > 20 26* 23 19   CREATININE 0.9  < > 0.8 0.9 1.0 0.9   ALKPHOS 42*  --  48*  --  58  --    ALT 6*  --  8*  --  9*  --    AST 12  --  14  --  15  --    BILITOT 1.3*  --  0.8  --  0.5  --    < > = values in this interval not displayed.   Coagulation:     Recent Labs  Lab 03/22/18 0301 03/22/18  0708 03/22/18  1325 03/23/18  0345 03/24/18  0610   INR  --  1.1  --  1.2  1.2 1.5*   APTT 50.0*  --  54.7* 54.2*  --      LDH:    Recent Labs  Lab 03/22/18  0301 03/23/18  0345 03/24/18  0610    138 165     Microbiology:  Microbiology Results (last 7 days)     ** No results found for the last 168 hours. **          I have reviewed all pertinent labs within the past 24 hours.      Estimated Creatinine Clearance: 74.5 mL/min (based on SCr of 0.9 mg/dL).    Diagnostic Results:

## 2018-03-24 NOTE — PLAN OF CARE
Problem: Patient Care Overview  Goal: Plan of Care Review  Outcome: Ongoing (interventions implemented as appropriate)  Patient progressing toward all goals. Plan of care discussed with patient, no questions at this time. Patient ambulating independently, fall precautions in place. Heparin infusing; Wife to do dressing change; pt and wife in [place] visiting frequently with other patients;  VS & LVAD numbers WNL. Will monitor.

## 2018-03-24 NOTE — ASSESSMENT & PLAN NOTE
- S/P Small Bowel resection 3/13/18.  POD #11  - Minor surgical site bleeding and H/H trending up to 7.3  - Iron sat low (8)  - got one iron infusion yesterday  - Continue current oral iron replacement  - continue oral PPI  - continue heparin gtt bridging dose for LVAD  - Coumadin 5mg today

## 2018-03-24 NOTE — PROGRESS NOTES
[facility]  Heart Transplant  Progress Note    Patient Name: Suman Hayden  MRN: [id number]  Admission Date: 3/5/2018  Hospital Length of Stay: 19 days  Attending Physician: Ortega Leos MD  Primary Care Provider: Joe Ernst MD  Principal Problem:Anemia    Subjective:     Interval History:     No acute overnight event. Had a normal BM last night. Hemoglobin trending up to 7.3 after iron infusion. INR trending up to 1.5    Continuous Infusions:   heparin (porcine) in 5 % dex 15 Units/kg/hr (03/24/18 0602)     Scheduled Meds:   ALPRAZolam  0.5 mg Oral Once    amLODIPine  5 mg Oral Daily    dronabinol  5 mg Oral TID    furosemide  20 mg Oral BID    lisinopril  5 mg Oral Daily    magnesium oxide  400 mg Oral BID    pantoprazole  40 mg Oral BID AC    potassium chloride  20 mEq Oral Daily    pravastatin  20 mg Oral QHS    warfarin  5 mg Oral Once     PRN Meds:acetaminophen, baclofen, benzonatate, hydrocodone-acetaminophen 5-325mg, ondansetron, ondansetron, polyethylene glycol    Review of patient's allergies indicates:   Allergen Reactions    Pneumococcal 23-killian ps vaccine      Objective:     Vital Signs (Most Recent):  Temp: 98 °F (36.7 °C) (03/24/18 1126)  Pulse: 75 (03/24/18 1000)  Resp: 18 (03/24/18 1126)  BP: (!) 86/50 (03/24/18 0913)  SpO2: 98 % (03/24/18 0913) Vital Signs (24h Range):  Temp:  [96.7 °F (35.9 °C)-98.4 °F (36.9 °C)] 98 °F (36.7 °C)  Pulse:  [75-95] 75  Resp:  [16-18] 18  SpO2:  [95 %-99 %] 98 %  BP: (62-88)/(0-63) 86/50     Patient Vitals for the past 72 hrs (Last 3 readings):   Weight   03/24/18 0700 66.1 kg (145 lb 11.6 oz)   03/23/18 0709 67.5 kg (148 lb 13 oz)   03/22/18 0726 67 kg (147 lb 11.3 oz)     Body mass index is 21.52 kg/m².      Intake/Output Summary (Last 24 hours) at 03/24/18 1202  Last data filed at 03/24/18 0900   Gross per 24 hour   Intake              520 ml   Output             5150 ml   Net            -4630 ml       Hemodynamic  Parameters:       Telemetry:     Physical Exam     Constitutional: He is oriented to person, place, and time.   HENT:   Temporal wasting   Neck: No JVD present.   Cardiovascular:   LVAD hum- smooth   Pulmonary/Chest: Effort normal and breath sounds normal.   Abdominal: Soft. Bowel sounds are normal.   Mid lower abd quad wound covered with dry dressing. Nurse changing the dressing.  Musculoskeletal: He exhibits no edema or tenderness.   Neurological: He is alert and oriented to person, place, and time.   Skin: Skin is warm and dry.   Nursing note and vitals reviewed.    Significant Labs:  CBC:    Recent Labs  Lab 03/22/18  0301 03/23/18  0345 03/24/18  0610   WBC 6.94 6.08 5.49   RBC 2.71* 2.60* 2.84*   HGB 7.1* 6.8* 7.3*   HCT 22.9* 22.2* 24.1*    205 220   MCV 85 85 85   MCH 26.2* 26.2* 25.7*   MCHC 31.0* 30.6* 30.3*     BNP:    Recent Labs  Lab 03/19/18  0527 03/21/18  0512 03/23/18  0345   * 126* 109*     CMP:    Recent Labs  Lab 03/19/18  0527 03/21/18  0512 03/22/18  0301 03/23/18  0345 03/24/18  0610   GLU 96  < > 81 90 86 83   CALCIUM 8.6*  < > 8.8 9.0 8.5* 9.1   ALBUMIN 2.8*  --  2.9*  --  2.9*  --    PROT 5.4*  --  5.7*  --  5.7*  --    *  < > 135* 133* 136 135*   K 3.8  < > 4.3 4.4 4.2 4.3   CO2 27  < > 28 27 26 27     < > 100 100 102 100   BUN 21  < > 20 26* 23 19   CREATININE 0.9  < > 0.8 0.9 1.0 0.9   ALKPHOS 42*  --  48*  --  58  --    ALT 6*  --  8*  --  9*  --    AST 12  --  14  --  15  --    BILITOT 1.3*  --  0.8  --  0.5  --    < > = values in this interval not displayed.   Coagulation:     Recent Labs  Lab 03/22/18  0301 03/22/18  0708 03/22/18  1325 03/23/18  0345 03/24/18  0610   INR  --  1.1  --  1.2  1.2 1.5*   APTT 50.0*  --  54.7* 54.2*  --      LDH:    Recent Labs  Lab 03/22/18  0301 03/23/18  0345 03/24/18  0610    138 165     Microbiology:  Microbiology Results (last 7 days)     ** No results found for the last 168 hours. **          I have reviewed all  "pertinent labs within the past 24 hours.      Estimated Creatinine Clearance: 74.5 mL/min (based on SCr of 0.9 mg/dL).    Diagnostic Results:      Assessment and Plan:     No notes on file    * Anemia    - S/P Small Bowel resection 3/13/18.  POD #11  - Minor surgical site bleeding and H/H trending up to 7.3  - Iron sat low (8)  - got one iron infusion yesterday  - Continue current oral iron replacement  - continue oral PPI  - continue heparin gtt bridging dose for LVAD  - Coumadin 5mg today        Hepatitis B core antibody positive since 2012    - Appreciate Hepatology's help: "Pt has evidence of immunity to HBV and no evidence of any viremia. Given above, pt does NOT require treatment with lamivudine if he were to undergo transplantation. He should follow up with hepatology clinic if he were to undergo transplant."  - Hep B viral DNA 3/8/18 < 10          LVAD (left ventricular assist device) present    - HM III placed on 7/2017 as DT with Speed @ 5200  - INR today 1.5. Coumadin 5 mg given today. Heparin at full bridging dose. No ASA  - LDH stable and at baseline   - MAP goal of 60-80. Pulsatile. Continue PTA regimen             Ileum ulcer    - S/P resection. No malignancy  - Gen surgery have signed off. Dressing per nursing staff   - General surgery have signed off. Will ask of their plan on post d/c clinic          Discussed plan of care with Dr. Deric Negrete MD  Heart Transplant  [facility]  "

## 2018-03-25 NOTE — PROGRESS NOTES
[facility]  Heart Transplant  Progress Note    Patient Name: Suman Hayden  MRN: [id number]  Admission Date: 3/5/2018  Hospital Length of Stay: 20 days  Attending Physician: Ortega Leos MD  Primary Care Provider: Joe Ernst MD  Principal Problem:Anemia    Subjective:     Interval History:     No acute overnight event. INR same as yesterday (1.5). abd wound no longer bleeding. Denies melena    Continuous Infusions:   heparin (porcine) in 5 % dex 15 Units/kg/hr (03/24/18 0602)     Scheduled Meds:   ALPRAZolam  0.5 mg Oral Once    amLODIPine  5 mg Oral Daily    dronabinol  5 mg Oral TID    furosemide  20 mg Oral BID    lisinopril  5 mg Oral Daily    magnesium oxide  400 mg Oral BID    pantoprazole  40 mg Oral BID AC    potassium chloride  20 mEq Oral Daily    pravastatin  20 mg Oral QHS    warfarin  7.5 mg Oral Once     PRN Meds:acetaminophen, baclofen, benzonatate, hydrocodone-acetaminophen 5-325mg, ondansetron, ondansetron, polyethylene glycol    Review of patient's allergies indicates:   Allergen Reactions    Pneumococcal 23-killian ps vaccine      Objective:     Vital Signs (Most Recent):  Temp: 98.6 °F (37 °C) (03/25/18 0544)  Pulse: 64 (03/25/18 0719)  Resp: 17 (03/25/18 0544)  BP: (!) 74/0 (03/25/18 0400)  SpO2: 99 % (03/25/18 0544) Vital Signs (24h Range):  Temp:  [98 °F (36.7 °C)-98.8 °F (37.1 °C)] 98.6 °F (37 °C)  Pulse:  [42-88] 64  Resp:  [16-18] 17  SpO2:  [96 %-99 %] 99 %  BP: (68-86)/(0-50) 74/0     Patient Vitals for the past 72 hrs (Last 3 readings):   Weight   03/24/18 0700 66.1 kg (145 lb 11.6 oz)   03/23/18 0709 67.5 kg (148 lb 13 oz)     Body mass index is 21.52 kg/m².      Intake/Output Summary (Last 24 hours) at 03/25/18 0823  Last data filed at 03/25/18 0600   Gross per 24 hour   Intake              840 ml   Output             4100 ml   Net            -3260 ml       Hemodynamic Parameters:       Telemetry:     Physical Exam     Constitutional: He is  oriented to person, place, and time.   HENT:   Temporal wasting   Neck: No JVD present.   Cardiovascular:   LVAD hum- smooth   Pulmonary/Chest: Effort normal and breath sounds normal.   Abdominal: Soft. Bowel sounds are normal.   Mid lower abd quad wound covered with dry dressing. Nurse changing the dressing.  Musculoskeletal: He exhibits no edema or tenderness.   Neurological: He is alert and oriented to person, place, and time.   Skin: Skin is warm and dry.   Nursing note and vitals reviewed.    Significant Labs:  CBC:    Recent Labs  Lab 03/22/18  0301 03/23/18  0345 03/24/18  0610   WBC 6.94 6.08 5.49   RBC 2.71* 2.60* 2.84*   HGB 7.1* 6.8* 7.3*   HCT 22.9* 22.2* 24.1*    205 220   MCV 85 85 85   MCH 26.2* 26.2* 25.7*   MCHC 31.0* 30.6* 30.3*     BNP:    Recent Labs  Lab 03/19/18  0527 03/21/18  0512 03/23/18  0345   * 126* 109*     CMP:    Recent Labs  Lab 03/19/18  0527  03/21/18  0512  03/23/18  0345 03/24/18  0610 03/25/18  0500   GLU 96  < > 81  < > 86 83 77   CALCIUM 8.6*  < > 8.8  < > 8.5* 9.1 9.2   ALBUMIN 2.8*  --  2.9*  --  2.9*  --   --    PROT 5.4*  --  5.7*  --  5.7*  --   --    *  < > 135*  < > 136 135* 136   K 3.8  < > 4.3  < > 4.2 4.3 4.3   CO2 27  < > 28  < > 26 27 25     < > 100  < > 102 100 103   BUN 21  < > 20  < > 23 19 16   CREATININE 0.9  < > 0.8  < > 1.0 0.9 0.9   ALKPHOS 42*  --  48*  --  58  --   --    ALT 6*  --  8*  --  9*  --   --    AST 12  --  14  --  15  --   --    BILITOT 1.3*  --  0.8  --  0.5  --   --    < > = values in this interval not displayed.   Coagulation:     Recent Labs  Lab 03/22/18  0301  03/22/18  1325 03/23/18  0345 03/24/18  0610 03/25/18  0500   INR  --   < >  --  1.2  1.2 1.5* 1.5*   APTT 50.0*  --  54.7* 54.2*  --   --    < > = values in this interval not displayed.  LDH:    Recent Labs  Lab 03/23/18  0345 03/24/18  0610 03/25/18  0500    165 177     Microbiology:  Microbiology Results (last 7 days)     ** No results found for  "the last 168 hours. **          I have reviewed all pertinent labs within the past 24 hours.      Estimated Creatinine Clearance: 74.5 mL/min (based on SCr of 0.9 mg/dL).    Diagnostic Results:      Assessment and Plan:     No notes on file    * Anemia    - S/P Small Bowel resection 3/13/18.  POD #12  - Minor surgical site bleeding and H/H trending up to 7.3 yesterday.   - Iron sat low (8)  - got one iron infusion with good response  - Continue current oral iron replacement  - continue oral PPI  - hopeful d/c tomorrow once INR is therapeutic          Hepatitis B core antibody positive since 2012    - Appreciate Hepatology's help: "Pt has evidence of immunity to HBV and no evidence of any viremia. Given above, pt does NOT require treatment with lamivudine if he were to undergo transplantation. He should follow up with hepatology clinic if he were to undergo transplant."  - Hep B viral DNA 3/8/18 < 10          LVAD (left ventricular assist device) present    - HM III placed on 7/2017 as DT with Speed @ 5200  - INR today 1.5. Coumadin 7.5 mg given today.   -Heparin at full bridging dose. No ASA  - LDH stable and at baseline   - MAP goal of 60-80. Pulsatile. Continue PTA regimen             Ileum ulcer    - S/P resection. No malignancy  - Dressing per nursing staff. Bleeding has tapered off  - General surgery have signed off. Will ask of their plan on post d/c clinic              Discussed with Dr. Deric Negrete MD  Heart Transplant  [facility]  "

## 2018-03-25 NOTE — ASSESSMENT & PLAN NOTE
- HM III placed on 7/2017 as DT with Speed @ 5200  - INR today 1.5. Coumadin 7.5 mg given today.   -Heparin at full bridging dose. No ASA  - LDH stable and at baseline   - MAP goal of 60-80. Pulsatile. Continue PTA regimen

## 2018-03-25 NOTE — PROGRESS NOTES
LVAD dsg change done using sterile technique with soap and water;  DLES; 1 - Pink, healthy tissue incorporating into the driveline with little or no erythema, tenderness, or drainage;  Monitoring.

## 2018-03-25 NOTE — PROGRESS NOTES
Abdominal staples cleaned with soap and water from sterile LVAD dressing change; It was loosely packed with sterile Bebo; Bebo was placed over the staples and tapped

## 2018-03-25 NOTE — SUBJECTIVE & OBJECTIVE
Interval History:     No acute overnight event. INR same as yesterday (1.5). abd wound no longer bleeding. Denies melena    Continuous Infusions:   heparin (porcine) in 5 % dex 15 Units/kg/hr (03/24/18 0602)     Scheduled Meds:   ALPRAZolam  0.5 mg Oral Once    amLODIPine  5 mg Oral Daily    dronabinol  5 mg Oral TID    furosemide  20 mg Oral BID    lisinopril  5 mg Oral Daily    magnesium oxide  400 mg Oral BID    pantoprazole  40 mg Oral BID AC    potassium chloride  20 mEq Oral Daily    pravastatin  20 mg Oral QHS    warfarin  7.5 mg Oral Once     PRN Meds:acetaminophen, baclofen, benzonatate, hydrocodone-acetaminophen 5-325mg, ondansetron, ondansetron, polyethylene glycol    Review of patient's allergies indicates:   Allergen Reactions    Pneumococcal 23-killian ps vaccine      Objective:     Vital Signs (Most Recent):  Temp: 98.6 °F (37 °C) (03/25/18 0544)  Pulse: 64 (03/25/18 0719)  Resp: 17 (03/25/18 0544)  BP: (!) 74/0 (03/25/18 0400)  SpO2: 99 % (03/25/18 0544) Vital Signs (24h Range):  Temp:  [98 °F (36.7 °C)-98.8 °F (37.1 °C)] 98.6 °F (37 °C)  Pulse:  [42-88] 64  Resp:  [16-18] 17  SpO2:  [96 %-99 %] 99 %  BP: (68-86)/(0-50) 74/0     Patient Vitals for the past 72 hrs (Last 3 readings):   Weight   03/24/18 0700 66.1 kg (145 lb 11.6 oz)   03/23/18 0709 67.5 kg (148 lb 13 oz)     Body mass index is 21.52 kg/m².      Intake/Output Summary (Last 24 hours) at 03/25/18 0823  Last data filed at 03/25/18 0600   Gross per 24 hour   Intake              840 ml   Output             4100 ml   Net            -3260 ml       Hemodynamic Parameters:       Telemetry:     Physical Exam     Constitutional: He is oriented to person, place, and time.   HENT:   Temporal wasting   Neck: No JVD present.   Cardiovascular:   LVAD hum- smooth   Pulmonary/Chest: Effort normal and breath sounds normal.   Abdominal: Soft. Bowel sounds are normal.   Mid lower abd quad wound covered with dry dressing. Nurse changing the  dressing.  Musculoskeletal: He exhibits no edema or tenderness.   Neurological: He is alert and oriented to person, place, and time.   Skin: Skin is warm and dry.   Nursing note and vitals reviewed.    Significant Labs:  CBC:    Recent Labs  Lab 03/22/18 0301 03/23/18 0345 03/24/18  0610   WBC 6.94 6.08 5.49   RBC 2.71* 2.60* 2.84*   HGB 7.1* 6.8* 7.3*   HCT 22.9* 22.2* 24.1*    205 220   MCV 85 85 85   MCH 26.2* 26.2* 25.7*   MCHC 31.0* 30.6* 30.3*     BNP:    Recent Labs  Lab 03/19/18  0527 03/21/18  0512 03/23/18  0345   * 126* 109*     CMP:    Recent Labs  Lab 03/19/18 0527 03/21/18  0512 03/23/18 0345 03/24/18  0610 03/25/18  0500   GLU 96  < > 81  < > 86 83 77   CALCIUM 8.6*  < > 8.8  < > 8.5* 9.1 9.2   ALBUMIN 2.8*  --  2.9*  --  2.9*  --   --    PROT 5.4*  --  5.7*  --  5.7*  --   --    *  < > 135*  < > 136 135* 136   K 3.8  < > 4.3  < > 4.2 4.3 4.3   CO2 27  < > 28  < > 26 27 25     < > 100  < > 102 100 103   BUN 21  < > 20  < > 23 19 16   CREATININE 0.9  < > 0.8  < > 1.0 0.9 0.9   ALKPHOS 42*  --  48*  --  58  --   --    ALT 6*  --  8*  --  9*  --   --    AST 12  --  14  --  15  --   --    BILITOT 1.3*  --  0.8  --  0.5  --   --    < > = values in this interval not displayed.   Coagulation:     Recent Labs  Lab 03/22/18  0301 03/22/18  1325 03/23/18  0345 03/24/18  0610 03/25/18  0500   INR  --   < >  --  1.2  1.2 1.5* 1.5*   APTT 50.0*  --  54.7* 54.2*  --   --    < > = values in this interval not displayed.  LDH:    Recent Labs  Lab 03/23/18  0345 03/24/18  0610 03/25/18  0500    165 177     Microbiology:  Microbiology Results (last 7 days)     ** No results found for the last 168 hours. **          I have reviewed all pertinent labs within the past 24 hours.      Estimated Creatinine Clearance: 74.5 mL/min (based on SCr of 0.9 mg/dL).    Diagnostic Results:

## 2018-03-25 NOTE — PLAN OF CARE
Problem: Patient Care Overview  Goal: Plan of Care Review  Plan of care discussed with patient. Fall precautions in place. Patient has no complaints of pain. Discussed medications and care.Heparin gtt continued.  Patient has no questions at this time.White board updated. Bed locked in lowest position. Side rails up x2. Will continue to monitor.

## 2018-03-25 NOTE — ASSESSMENT & PLAN NOTE
- S/P Small Bowel resection 3/13/18.  POD #12  - Minor surgical site bleeding and H/H trending up to 7.3 yesterday.   - Iron sat low (8)  - got one iron infusion with good response  - Continue current oral iron replacement  - continue oral PPI

## 2018-03-25 NOTE — ASSESSMENT & PLAN NOTE
- S/P resection. No malignancy  - Dressing per nursing staff. Bleeding has tapered off  - General surgery have signed off. Will ask of their plan on post d/c clinic

## 2018-03-26 NOTE — ASSESSMENT & PLAN NOTE
- S/P Small Bowel resection 3/13/18  - Hgb stable but low (asymptomatic)   - Iron sat low (8)  - Continue current oral iron replacement  - continue oral PPI

## 2018-03-26 NOTE — PROGRESS NOTES
[facility]  Heart Transplant  Progress Note    Patient Name: Suman Hayden  MRN: [id number]  Admission Date: 3/5/2018  Hospital Length of Stay: 21 days  Attending Physician: Ortega Leos MD  Primary Care Provider: Joe Ernst MD  Principal Problem:Anemia    Subjective:     Interval History: No acute issues overnight. Eating and drinking okay. Moving bowel and bladder without issues. All questions answered. Feels great. INR trended down to 1.4 (planning to boost)     Continuous Infusions:   heparin (porcine) in 5 % dex 15 Units/kg/hr (03/26/18 0939)     Scheduled Meds:   ALPRAZolam  0.5 mg Oral Once    amLODIPine  5 mg Oral Daily    dronabinol  5 mg Oral TID    furosemide  20 mg Oral BID    lisinopril  5 mg Oral Daily    magnesium oxide  400 mg Oral BID    pantoprazole  40 mg Oral BID AC    potassium chloride  20 mEq Oral Daily    pravastatin  20 mg Oral QHS    warfarin  7.5 mg Oral Daily     PRN Meds:acetaminophen, baclofen, benzonatate, hydrocodone-acetaminophen 5-325mg, ondansetron, ondansetron, polyethylene glycol    Review of patient's allergies indicates:   Allergen Reactions    Pneumococcal 23-killian ps vaccine      Objective:     Vital Signs (Most Recent):  Temp: 98.2 °F (36.8 °C) (03/26/18 1145)  Pulse: 81 (03/26/18 1145)  Resp: 18 (03/26/18 1145)  BP: (!) 72/0 (03/26/18 1245)  SpO2: 98 % (03/26/18 1145) Vital Signs (24h Range):  Temp:  [97.9 °F (36.6 °C)-98.7 °F (37.1 °C)] 98.2 °F (36.8 °C)  Pulse:  [57-88] 81  Resp:  [16-18] 18  SpO2:  [94 %-99 %] 98 %  BP: (62-82)/(0-56) 72/0     Patient Vitals for the past 72 hrs (Last 3 readings):   Weight   03/26/18 0700 66.8 kg (147 lb 4.3 oz)   03/24/18 0700 66.1 kg (145 lb 11.6 oz)     Body mass index is 21.75 kg/m².      Intake/Output Summary (Last 24 hours) at 03/26/18 1429  Last data filed at 03/26/18 1300   Gross per 24 hour   Intake             1925 ml   Output             3600 ml   Net            -1675 ml     Physical  Exam   Constitutional: Thin male. No acute distress.   HENT: NC/AT. PERRL   Neck: No JVD present.   Cardiovascular: LVAD hum- smooth   Pulmonary/Chest: Effort normal and breath sounds normal.   Abdominal: Soft. Bowel sounds are normal. Incision appears C/D/I  Musculoskeletal: He exhibits no edema or tenderness.   Neurological: He is alert and oriented to person, place, and time.   Skin: Skin is warm and dry.   Nursing note and vitals reviewed.    Significant Labs:  CBC:    Recent Labs  Lab 03/24/18  0610 03/25/18  1240 03/26/18  0525   WBC 5.49 5.79 5.24   RBC 2.84* 2.84* 2.72*   HGB 7.3* 7.4* 7.2*   HCT 24.1* 24.1* 23.3*    247 226   MCV 85 85 86   MCH 25.7* 26.1* 26.5*   MCHC 30.3* 30.7* 30.9*     BNP:    Recent Labs  Lab 03/21/18  0512 03/23/18  0345 03/26/18  0525   * 109* 153*     CMP:    Recent Labs  Lab 03/21/18  0512  03/23/18  0345 03/24/18  0610 03/25/18  0500 03/26/18  0525   GLU 81  < > 86 83 77 78   CALCIUM 8.8  < > 8.5* 9.1 9.2 9.3   ALBUMIN 2.9*  --  2.9*  --   --  3.1*   PROT 5.7*  --  5.7*  --   --  6.0   *  < > 136 135* 136 139   K 4.3  < > 4.2 4.3 4.3 4.2   CO2 28  < > 26 27 25 26     < > 102 100 103 105   BUN 20  < > 23 19 16 13   CREATININE 0.8  < > 1.0 0.9 0.9 0.8   ALKPHOS 48*  --  58  --   --  51*   ALT 8*  --  9*  --   --  13   AST 14  --  15  --   --  19   BILITOT 0.8  --  0.5  --   --  0.5   < > = values in this interval not displayed.   Coagulation:     Recent Labs  Lab 03/22/18  0301  03/22/18  1325 03/23/18  0345 03/24/18  0610 03/25/18  0500 03/26/18  0525   INR  --   < >  --  1.2  1.2 1.5* 1.5* 1.4*   APTT 50.0*  --  54.7* 54.2*  --   --   --    < > = values in this interval not displayed.  LDH:    Recent Labs  Lab 03/24/18  0610 03/25/18  0500 03/26/18  0525    177 152     I have reviewed all pertinent labs within the past 24 hours.    Estimated Creatinine Clearance: 84.7 mL/min (based on SCr of 0.8 mg/dL).    Diagnostic Results:      Assessment and  Plan:     No notes on file    * Anemia    - S/P Small Bowel resection 3/13/18  - Hgb stable but low (asymptomatic)   - Iron sat low (8)  - Continue current oral iron replacement  - continue oral PPI          LVAD (left ventricular assist device) present    - HM III placed on 7/2017 as DT with Speed @ 5200  - INR trended down to 1.4 - INR goal of 2-3  - Continue coumadin at boosted dose of 7.5mg daily   - Continue heparin gtt  - No ASA (given NSAID induced ulcer)   - LDH stable   - MAP goal of 60-80. Pulsatile.   - Continue PTA BP regimen             Ileum ulcer    - S/P resection. No malignancy  - Dressing per nursing staff. off  - General surgery have signed off.   - Will plan to have him follow up with surgery in 2-4 weeks after discharge          Hepatitis B core antibody positive since 2012    - Follow up with hepatology as needed.               Giovanni serrano, DO  Heart Transplant  [facility]

## 2018-03-26 NOTE — ASSESSMENT & PLAN NOTE
- S/P resection. No malignancy  - Dressing per nursing staff. off  - General surgery have signed off.   - Will plan to have him follow up with surgery in 2-4 weeks after discharge

## 2018-03-26 NOTE — PLAN OF CARE
Problem: Patient Care Overview  Goal: Plan of Care Review  Outcome: Ongoing (interventions implemented as appropriate)  Patient progressing toward all goals. Plan of care discussed with patient, no questions at this time. Patient ambulating independently, fall precautions in place. Heparin infusing; INR 1.4; VS & LVAD numbers WNL. Will monitor.

## 2018-03-26 NOTE — PLAN OF CARE
Problem: Patient Care Overview  Goal: Plan of Care Review  Plan of care discussed with patient.  Fall precautions in place. Patient has no complaints of pain. Discussed medications and care.VAD number and dopplers WDL. Patient has no questions at this time.White board updated. Bed locked in lowest position. Side rails up x2. Will continue to monitor.

## 2018-03-26 NOTE — ASSESSMENT & PLAN NOTE
- HM III placed on 7/2017 as DT with Speed @ 5200  - INR trended down to 1.4 - INR goal of 2-3  - Continue coumadin at boosted dose of 7.5mg daily   - Continue heparin gtt  - No ASA (given NSAID induced ulcer)   - LDH stable   - MAP goal of 60-80. Pulsatile.   - Continue PTA BP regimen

## 2018-03-26 NOTE — SUBJECTIVE & OBJECTIVE
Interval History: No acute issues overnight. Eating and drinking okay. Moving bowel and bladder without issues. All questions answered. Feels great. INR trended down to 1.4 (planning to boost)     Continuous Infusions:   heparin (porcine) in 5 % dex 15 Units/kg/hr (03/26/18 0939)     Scheduled Meds:   ALPRAZolam  0.5 mg Oral Once    amLODIPine  5 mg Oral Daily    dronabinol  5 mg Oral TID    furosemide  20 mg Oral BID    lisinopril  5 mg Oral Daily    magnesium oxide  400 mg Oral BID    pantoprazole  40 mg Oral BID AC    potassium chloride  20 mEq Oral Daily    pravastatin  20 mg Oral QHS    warfarin  7.5 mg Oral Daily     PRN Meds:acetaminophen, baclofen, benzonatate, hydrocodone-acetaminophen 5-325mg, ondansetron, ondansetron, polyethylene glycol    Review of patient's allergies indicates:   Allergen Reactions    Pneumococcal 23-killian ps vaccine      Objective:     Vital Signs (Most Recent):  Temp: 98.2 °F (36.8 °C) (03/26/18 1145)  Pulse: 81 (03/26/18 1145)  Resp: 18 (03/26/18 1145)  BP: (!) 72/0 (03/26/18 1245)  SpO2: 98 % (03/26/18 1145) Vital Signs (24h Range):  Temp:  [97.9 °F (36.6 °C)-98.7 °F (37.1 °C)] 98.2 °F (36.8 °C)  Pulse:  [57-88] 81  Resp:  [16-18] 18  SpO2:  [94 %-99 %] 98 %  BP: (62-82)/(0-56) 72/0     Patient Vitals for the past 72 hrs (Last 3 readings):   Weight   03/26/18 0700 66.8 kg (147 lb 4.3 oz)   03/24/18 0700 66.1 kg (145 lb 11.6 oz)     Body mass index is 21.75 kg/m².      Intake/Output Summary (Last 24 hours) at 03/26/18 1429  Last data filed at 03/26/18 1300   Gross per 24 hour   Intake             1925 ml   Output             3600 ml   Net            -1675 ml     Physical Exam   Constitutional: Thin male. No acute distress.   HENT: NC/AT. PERRL   Neck: No JVD present.   Cardiovascular: LVAD hum- smooth   Pulmonary/Chest: Effort normal and breath sounds normal.   Abdominal: Soft. Bowel sounds are normal. Incision appears C/D/I  Musculoskeletal: He exhibits no edema or  tenderness.   Neurological: He is alert and oriented to person, place, and time.   Skin: Skin is warm and dry.   Nursing note and vitals reviewed.    Significant Labs:  CBC:    Recent Labs  Lab 03/24/18  0610 03/25/18  1240 03/26/18  0525   WBC 5.49 5.79 5.24   RBC 2.84* 2.84* 2.72*   HGB 7.3* 7.4* 7.2*   HCT 24.1* 24.1* 23.3*    247 226   MCV 85 85 86   MCH 25.7* 26.1* 26.5*   MCHC 30.3* 30.7* 30.9*     BNP:    Recent Labs  Lab 03/21/18  0512 03/23/18  0345 03/26/18  0525   * 109* 153*     CMP:    Recent Labs  Lab 03/21/18  0512  03/23/18  0345 03/24/18  0610 03/25/18  0500 03/26/18  0525   GLU 81  < > 86 83 77 78   CALCIUM 8.8  < > 8.5* 9.1 9.2 9.3   ALBUMIN 2.9*  --  2.9*  --   --  3.1*   PROT 5.7*  --  5.7*  --   --  6.0   *  < > 136 135* 136 139   K 4.3  < > 4.2 4.3 4.3 4.2   CO2 28  < > 26 27 25 26     < > 102 100 103 105   BUN 20  < > 23 19 16 13   CREATININE 0.8  < > 1.0 0.9 0.9 0.8   ALKPHOS 48*  --  58  --   --  51*   ALT 8*  --  9*  --   --  13   AST 14  --  15  --   --  19   BILITOT 0.8  --  0.5  --   --  0.5   < > = values in this interval not displayed.   Coagulation:     Recent Labs  Lab 03/22/18  0301  03/22/18  1325 03/23/18  0345 03/24/18  0610 03/25/18  0500 03/26/18  0525   INR  --   < >  --  1.2  1.2 1.5* 1.5* 1.4*   APTT 50.0*  --  54.7* 54.2*  --   --   --    < > = values in this interval not displayed.  LDH:    Recent Labs  Lab 03/24/18  0610 03/25/18  0500 03/26/18  0525    177 152     I have reviewed all pertinent labs within the past 24 hours.    Estimated Creatinine Clearance: 84.7 mL/min (based on SCr of 0.8 mg/dL).    Diagnostic Results:

## 2018-03-27 NOTE — PROGRESS NOTES
Update:    JITENDRA and supervisor Francis to pt's room. Pt's wife present with pt in room. Pt's present aaox4 with open affect. Pt states understanding of doctor's plan. Pt reports coping well and denies further needs, questions, concerns at this time and none indicated. Providing ongoing psychosocial and counseling support, education, resources, assistance and discharge planning as indicated. Following and available.

## 2018-03-27 NOTE — PLAN OF CARE
Problem: Patient Care Overview  Goal: Plan of Care Review  Outcome: Ongoing (interventions implemented as appropriate)  Plan of care reviewed with pt. VS stable. Dopplers and VAD numbers WNL. No acute events at this time. Heparin gtt maintained, Xa therapeutic. Bed low and locked, call light within reach. Pt remains free from falls or injury at this time. No c/o CP or SOB. Will continue to monitor. Ryanne Rubio RN

## 2018-03-27 NOTE — ASSESSMENT & PLAN NOTE
- S/P Small Bowel resection 3/13/18 for chronic NSAID induced ulcer   - Hgb stable around 7 (asymptomatic)   - Iron sat low (8)  - Continue current oral iron replacement  - continue oral PPI

## 2018-03-27 NOTE — ASSESSMENT & PLAN NOTE
- HM III placed on 7/2017 as DT with Speed @ 5200  - INR 1.7 this AM  - INR goal of 2-3  - Continue coumadin at 7.5mg daily with heparin gtt as bridge  - No ASA (given NSAID induced ulcer)   - LDH stable   - MAP goal of 60-80. Pulsatile.   - Continue PTA BP regimen

## 2018-03-27 NOTE — PROGRESS NOTES
[facility]  Heart Transplant  Progress Note    Patient Name: Suman Hayden  MRN: [id number]  Admission Date: 3/5/2018  Hospital Length of Stay: 22 days  Attending Physician: Jin Franklin MD  Primary Care Provider: Joe Ernst MD  Principal Problem:Anemia    Subjective:     Interval History: Continues to do well. Planning for discharge once INR is >2    Continuous Infusions:   heparin (porcine) in D5W 18 Units/kg/hr (03/27/18 0948)     Scheduled Meds:   ALPRAZolam  0.5 mg Oral Once    amLODIPine  5 mg Oral Daily    dronabinol  5 mg Oral TID    furosemide  20 mg Oral BID    lisinopril  5 mg Oral Daily    magnesium oxide  400 mg Oral BID    pantoprazole  40 mg Oral BID AC    potassium chloride  20 mEq Oral Daily    pravastatin  20 mg Oral QHS    warfarin  7.5 mg Oral Daily     PRN Meds:acetaminophen, baclofen, benzonatate, hydrocodone-acetaminophen 5-325mg, ondansetron, ondansetron, polyethylene glycol    Review of patient's allergies indicates:   Allergen Reactions    Pneumococcal 23-killian ps vaccine      Objective:     Vital Signs (Most Recent):  Temp: 98.6 °F (37 °C) (03/27/18 0800)  Pulse: 71 (03/27/18 1000)  Resp: 18 (03/27/18 0800)  BP: (!) 74/0 (03/27/18 0800)  SpO2: 96 % (03/27/18 0800) Vital Signs (24h Range):  Temp:  [97.7 °F (36.5 °C)-98.6 °F (37 °C)] 98.6 °F (37 °C)  Pulse:  [] 71  Resp:  [16-18] 18  SpO2:  [95 %-98 %] 96 %  BP: (64-74)/(0) 74/0     Patient Vitals for the past 72 hrs (Last 3 readings):   Weight   03/26/18 0700 66.8 kg (147 lb 4.3 oz)     Body mass index is 21.75 kg/m².      Intake/Output Summary (Last 24 hours) at 03/27/18 1102  Last data filed at 03/27/18 0500   Gross per 24 hour   Intake              845 ml   Output             2300 ml   Net            -1455 ml     Physical Exam   Constitutional: Thin male. No acute distress.   HENT: NC/AT. PERRL   Neck: No JVD present.   Cardiovascular: LVAD hum- smooth   Pulmonary/Chest: Effort normal and  breath sounds normal.   Abdominal: Soft. Bowel sounds are normal. Incision appears C/D/I  Musculoskeletal: He exhibits no edema or tenderness.   Neurological: He is alert and oriented to person, place, and time.   Skin: Skin is warm and dry.   Nursing note and vitals reviewed.    Significant Labs:  CBC:    Recent Labs  Lab 03/25/18  1240 03/26/18  0525 03/27/18  0500   WBC 5.79 5.24 6.02   RBC 2.84* 2.72* 2.65*   HGB 7.4* 7.2* 7.0*   HCT 24.1* 23.3* 22.8*    226 228   MCV 85 86 86   MCH 26.1* 26.5* 26.4*   MCHC 30.7* 30.9* 30.7*     BNP:    Recent Labs  Lab 03/21/18  0512 03/23/18  0345 03/26/18  0525   * 109* 153*     CMP:    Recent Labs  Lab 03/21/18  0512 03/23/18  0345  03/25/18  0500 03/26/18  0525 03/27/18  0500   GLU 81  < > 86  < > 77 78 80   CALCIUM 8.8  < > 8.5*  < > 9.2 9.3 9.1   ALBUMIN 2.9*  --  2.9*  --   --  3.1*  --    PROT 5.7*  --  5.7*  --   --  6.0  --    *  < > 136  < > 136 139 138   K 4.3  < > 4.2  < > 4.3 4.2 4.2   CO2 28  < > 26  < > 25 26 25     < > 102  < > 103 105 105   BUN 20  < > 23  < > 16 13 12   CREATININE 0.8  < > 1.0  < > 0.9 0.8 0.9   ALKPHOS 48*  --  58  --   --  51*  --    ALT 8*  --  9*  --   --  13  --    AST 14  --  15  --   --  19  --    BILITOT 0.8  --  0.5  --   --  0.5  --    < > = values in this interval not displayed.     Coagulation:     Recent Labs  Lab 03/22/18  0301  03/22/18  1325 03/23/18  0345  03/25/18  0500 03/26/18  0525 03/27/18  0500   INR  --   < >  --  1.2  1.2  < > 1.5* 1.4* 1.7*   APTT 50.0*  --  54.7* 54.2*  --   --   --   --    < > = values in this interval not displayed.    LDH:    Recent Labs  Lab 03/25/18  0500 03/26/18  0525 03/27/18  0500    152 155     I have reviewed all pertinent labs within the past 24 hours.    Estimated Creatinine Clearance: 75.3 mL/min (based on SCr of 0.9 mg/dL).    Diagnostic Results:      Assessment and Plan:     No notes on file    * Anemia    - S/P Small Bowel resection 3/13/18 for  chronic NSAID induced ulcer   - Hgb stable around 7 (asymptomatic)   - Iron sat low (8)  - Continue current oral iron replacement  - continue oral PPI          LVAD (left ventricular assist device) present    - HM III placed on 7/2017 as DT with Speed @ 5200  - INR 1.7 this AM  - INR goal of 2-3  - Continue coumadin at 7.5mg daily with heparin gtt as bridge  - No ASA (given NSAID induced ulcer)   - LDH stable   - MAP goal of 60-80. Pulsatile.   - Continue PTA BP regimen             Ileum ulcer    - S/P resection. No malignancy  - Tolerating regular diet with regular BMs  - Dressing per nursing staff.   - General surgery have signed off.   - Will plan to have him follow up with surgery in 2-4 weeks after discharge          Hepatitis B core antibody positive since 2012    - Follow up with hepatology as needed.               Giovanni serrano, DO  Heart Transplant  [facility]

## 2018-03-27 NOTE — ASSESSMENT & PLAN NOTE
- S/P resection. No malignancy  - Tolerating regular diet with regular BMs  - Dressing per nursing staff.   - General surgery have signed off.   - Will plan to have him follow up with surgery in 2-4 weeks after discharge

## 2018-03-27 NOTE — SUBJECTIVE & OBJECTIVE
Interval History: Continues to do well. Planning for discharge once INR is >2    Continuous Infusions:   heparin (porcine) in D5W 18 Units/kg/hr (03/27/18 0948)     Scheduled Meds:   ALPRAZolam  0.5 mg Oral Once    amLODIPine  5 mg Oral Daily    dronabinol  5 mg Oral TID    furosemide  20 mg Oral BID    lisinopril  5 mg Oral Daily    magnesium oxide  400 mg Oral BID    pantoprazole  40 mg Oral BID AC    potassium chloride  20 mEq Oral Daily    pravastatin  20 mg Oral QHS    warfarin  7.5 mg Oral Daily     PRN Meds:acetaminophen, baclofen, benzonatate, hydrocodone-acetaminophen 5-325mg, ondansetron, ondansetron, polyethylene glycol    Review of patient's allergies indicates:   Allergen Reactions    Pneumococcal 23-killian ps vaccine      Objective:     Vital Signs (Most Recent):  Temp: 98.6 °F (37 °C) (03/27/18 0800)  Pulse: 71 (03/27/18 1000)  Resp: 18 (03/27/18 0800)  BP: (!) 74/0 (03/27/18 0800)  SpO2: 96 % (03/27/18 0800) Vital Signs (24h Range):  Temp:  [97.7 °F (36.5 °C)-98.6 °F (37 °C)] 98.6 °F (37 °C)  Pulse:  [] 71  Resp:  [16-18] 18  SpO2:  [95 %-98 %] 96 %  BP: (64-74)/(0) 74/0     Patient Vitals for the past 72 hrs (Last 3 readings):   Weight   03/26/18 0700 66.8 kg (147 lb 4.3 oz)     Body mass index is 21.75 kg/m².      Intake/Output Summary (Last 24 hours) at 03/27/18 1102  Last data filed at 03/27/18 0500   Gross per 24 hour   Intake              845 ml   Output             2300 ml   Net            -1455 ml     Physical Exam   Constitutional: Thin male. No acute distress.   HENT: NC/AT. PERRL   Neck: No JVD present.   Cardiovascular: LVAD hum- smooth   Pulmonary/Chest: Effort normal and breath sounds normal.   Abdominal: Soft. Bowel sounds are normal. Incision appears C/D/I  Musculoskeletal: He exhibits no edema or tenderness.   Neurological: He is alert and oriented to person, place, and time.   Skin: Skin is warm and dry.   Nursing note and vitals reviewed.    Significant  Labs:  CBC:    Recent Labs  Lab 03/25/18  1240 03/26/18  0525 03/27/18  0500   WBC 5.79 5.24 6.02   RBC 2.84* 2.72* 2.65*   HGB 7.4* 7.2* 7.0*   HCT 24.1* 23.3* 22.8*    226 228   MCV 85 86 86   MCH 26.1* 26.5* 26.4*   MCHC 30.7* 30.9* 30.7*     BNP:    Recent Labs  Lab 03/21/18  0512 03/23/18  0345 03/26/18  0525   * 109* 153*     CMP:    Recent Labs  Lab 03/21/18  0512 03/23/18  0345 03/25/18  0500 03/26/18  0525 03/27/18  0500   GLU 81  < > 86  < > 77 78 80   CALCIUM 8.8  < > 8.5*  < > 9.2 9.3 9.1   ALBUMIN 2.9*  --  2.9*  --   --  3.1*  --    PROT 5.7*  --  5.7*  --   --  6.0  --    *  < > 136  < > 136 139 138   K 4.3  < > 4.2  < > 4.3 4.2 4.2   CO2 28  < > 26  < > 25 26 25     < > 102  < > 103 105 105   BUN 20  < > 23  < > 16 13 12   CREATININE 0.8  < > 1.0  < > 0.9 0.8 0.9   ALKPHOS 48*  --  58  --   --  51*  --    ALT 8*  --  9*  --   --  13  --    AST 14  --  15  --   --  19  --    BILITOT 0.8  --  0.5  --   --  0.5  --    < > = values in this interval not displayed.     Coagulation:     Recent Labs  Lab 03/22/18  0301  03/22/18  1325 03/23/18  0345 03/25/18  0500 03/26/18  0525 03/27/18  0500   INR  --   < >  --  1.2  1.2  < > 1.5* 1.4* 1.7*   APTT 50.0*  --  54.7* 54.2*  --   --   --   --    < > = values in this interval not displayed.    LDH:    Recent Labs  Lab 03/25/18  0500 03/26/18  0525 03/27/18  0500    152 155     I have reviewed all pertinent labs within the past 24 hours.    Estimated Creatinine Clearance: 75.3 mL/min (based on SCr of 0.9 mg/dL).    Diagnostic Results:

## 2018-03-28 NOTE — PROGRESS NOTES
Right PICC d/c per MD order; Pt tolerated well; Tip intact dressing applied; will monitor; abdominal dressing changed with Mrs. Adam; Showed her how to paint the incision with betadine, gently pack with sterile gauze, cover with sterile gauze and tape. Instructed this should be completed at least once per day; Supplies given; will monitor; suggested she do this dressing close to the time the LVAD dressing is done

## 2018-03-28 NOTE — PROGRESS NOTES
DISCHARGE    Pt presents in room with wife. Pt aaox4 with pleasant affect. Pt states in agreement with plan to discharge to home today with [facility] ph [phone], fax [phone]. Spoke to [facility] liaison re: discharge and that orders are in. Pt's wife will transport. Pt reports coping well and denies further needs, questions, concerns at this time and none indicated. Providing psychosocial and counseling support, education, resources, assistance and discharge planning as indicated. SW remains available.

## 2018-03-28 NOTE — NURSING
Patient discharged per MD orders. Instructions given on medications, wound care, activity, signs of infection, when to call MD, and follow up appointments. Pt verbalized understanding.  Patient AAOx3, VSS, no c/o pain or discomfort at this time. Telemetry and PIV removed. Patient and family refused transport, left in own wheelchair

## 2018-03-28 NOTE — PROGRESS NOTES
Mr. Hayden will be discharged today following admission for symptomatic anemia.  PMH includes HM3 lvad, GI bleeding, and staph epi bacteremia.  During hospital stay, he eventually required a partial small bowel resection for an ileal ulcer.  He was discharged once INR was therapeutic without further bleeding complications.  INR goal remains 2-3 requiring a bridge, and he remains off ASA.  Discharge warfarin dose is 5mg/day, except 2.5mg on Fridays.  Recheck INR on Friday.  Patient counseling for discharge medications was provided.       Suman Hayden   Home Medication Instructions MYRANDA:[id number]    Printed on:03/28/18 1178   Medication Information                      amLODIPine (NORVASC) 5 MG tablet  Take 5 mg by mouth once daily. Take one tablet 5mg by mouth once a day.             benzonatate (TESSALON) 100 MG capsule  Take 1 capsule (100 mg total) by mouth 3 (three) times daily as needed for Cough.             dronabinol (MARINOL) 5 MG capsule  Take 1 capsule (5 mg total) by mouth 3 (three) times daily before meals.             furosemide (LASIX) 20 MG tablet  Take 1 tablet (20 mg total) by mouth 2 (two) times daily.             hydrocodone-acetaminophen 5-325mg (NORCO) 5-325 mg per tablet  Take 1 tablet by mouth every 6 (six) hours as needed for Pain.             lisinopril (PRINIVIL,ZESTRIL) 5 MG tablet  Take 1 tablet (5 mg total) by mouth once daily.             magnesium oxide (MAG-OX) 400 mg tablet  Take 1 tablet (400 mg total) by mouth 2 (two) times daily.             pantoprazole (PROTONIX) 40 MG tablet  Take 1 tablet (40 mg total) by mouth 2 (two) times daily before meals.             polyethylene glycol (GLYCOLAX) 17 gram PwPk  Take 17 g by mouth daily as needed (constipation).             potassium chloride (MICRO-K) 10 MEQ CpSR  Take 2 capsules (20 mEq total) by mouth once daily.             pravastatin (PRAVACHOL) 20 MG tablet  Take 1 tablet (20 mg total) by mouth every evening.              warfarin (COUMADIN) 5 MG tablet  Take 5mg orally daily, except 2.5mg on Fridays

## 2018-03-28 NOTE — DISCHARGE SUMMARY
[facility]  Cardiology  Discharge Summary      Patient Name: Suman Hayden  MRN: [id number]  Admission Date: 3/5/2018  Hospital Length of Stay: 23 days  Discharge Date and Time:  03/28/2018 1:37 PM  Attending Physician: Jin Franklin MD  Discharging Provider: Giovanni hayden DO  Primary Care Physician: Joe Ernst MD    HPI: Briefly 66 y/o M with NICMM, HMIII as DT implanted 7/27/17. Post-op course complicated by Gi bleeding had double balloon enteropscopy in 12/2017 (bleeding ulcer)  and 1/25/2018 no bleeding but ulcer found.  Retrograde DBE on 1/26/18 which was negative. During last admission pt was transfused PRBCs during hospitalization and Hgb on day of d/c is 8.  Now per patient had his CBC checked today with Hgb in the 6's, with INR of 2. Reported melena (solid dark black stool x 1).  Denies low flow VAD alarms, no syncope, presyncope, N/V or hematemesis.As of yesterday patient states that he was able to climb 15 steps w/o any issues, but since today afternoon feels fatigued, and required a wheelchair to get from the parking garage to the hospital.    Procedure(s) (LRB):  EXPLORATORY-LAPAROTOMY with small bowel resection  (N/A)  RESECTION-BOWEL     Indwelling Lines/Drains at time of discharge:  Lines/Drains/Airways     Line                 VAD 07/27/17 1312 Left ventricular assist device HeartMate 3 244 days              Hospital Course (synopsis of major diagnoses, care, treatment, and services provided during the course of the hospital stay): Mr. Hayden had an extended hospital stay for a GI bleed resulting in ex lap with partial small bowel resection on 3/13/2018 with minimal postoperative complications. He is being discharged with plans to have INR re-checked this Friday and to follow up with LVAD / gen surgery in 1-2 weeks. 7 day supply of pain meds given with instructions to call if this isn't enough. On day of discharge he was tolerating a full diet with regular no  melanotic stools.     Consults:   Consults         Status Ordering Provider     Inpatient consult to Gastroenterology  Once     Provider:  (Not yet assigned)    Completed TARIQ SALTER     Inpatient consult to General Surgery  Once     Provider:  Kenyon Tellez MD    Completed CHRISTEL LOPEZ     Inpatient consult to Hepatology  Once     Provider:  (Not yet assigned)    Completed CHRISTEL LOPEZ     [facility]  Once     Provider:  (Not yet assigned)    Completed CHRISTEL LOPEZ        Significant Diagnostic Studies: Labs:   BMP:   Recent Labs  Lab 03/27/18  0500 03/28/18  0509   GLU 80 77    138   K 4.2 4.1    104   CO2 25 27   BUN 12 11   CREATININE 0.9 0.8   CALCIUM 9.1 9.3   MG 2.0 2.1     CMP   Recent Labs  Lab 03/27/18  0500 03/28/18  0509    138   K 4.2 4.1    104   CO2 25 27   GLU 80 77   BUN 12 11   CREATININE 0.9 0.8   CALCIUM 9.1 9.3   PROT  --  5.9*   ALBUMIN  --  3.0*   BILITOT  --  0.5   ALKPHOS  --  49*   AST  --  19   ALT  --  13   ANIONGAP 8 7*   ESTGFRAFRICA >60.0 >60.0   EGFRNONAA >60.0 >60.0   , CBC   Recent Labs  Lab 03/27/18  0500 03/28/18  0509   WBC 6.02 5.46   HGB 7.0* 7.0*   HCT 22.8* 23.0*    234    and INR   Lab Results   Component Value Date    INR 2.1 (H) 03/28/2018    INR 1.7 (H) 03/27/2018    INR 1.4 (H) 03/26/2018       Pending Diagnostic Studies:     Procedure Component Value Units Date/Time    APTT [[id number]] Collected:  03/22/18 0306    Order Status:  Sent Lab Status:  In process Updated:  03/22/18 0307    Specimen:  Blood from Blood     CBC auto differential [[id number]] Collected:  03/13/18 1815    Order Status:  Sent Lab Status:  In process Updated:  03/13/18 1816    Specimen:  Blood from Blood           Final Active Diagnoses:    Diagnosis Date Noted POA    PRINCIPAL PROBLEM:  Anemia [D64.9] 12/08/2017 Yes    LVAD (left ventricular assist device) present [Z95.811] 07/29/2017 Not Applicable     Ileum ulcer [K63.3] 12/27/2017 Yes    Wound drainage [T14.8XXA] 03/22/2018 Yes    Chronic systolic congestive heart failure [I50.22]  Unknown    GI bleed [K92.2] 03/14/2018 Yes    Acute blood loss anemia [D62] 03/11/2018 Yes    Melena [K92.1] 01/25/2018 Yes    Anticoagulation monitoring, INR range 2-3 [Z79.01] 09/22/2017 Not Applicable    AICD (automatic cardioverter/defibrillator) present [Z95.810] 08/06/2017 Yes    Hepatitis B core antibody positive since 2012 [R76.8] 07/07/2017 Yes    Chronic combined systolic and diastolic congestive heart failure [I50.42] 07/07/2017 Yes    Nonischemic cardiomyopathy [I42.8] 06/25/2017 Yes      Problems Resolved During this Admission:    Diagnosis Date Noted Date Resolved POA    GI bleed [K92.2] 01/23/2018 03/14/2018 Yes    Heart transplant candidate [Z76.82]  03/12/2018 Not Applicable       Discharged Condition: good    Follow Up:    Patient Instructions:   No discharge procedures on file.    Medications:  Reconciled Home Medications:      Medication List      START taking these medications    benzonatate 100 MG capsule  Commonly known as:  TESSALON  Take 1 capsule (100 mg total) by mouth 3 (three) times daily as needed for Cough.     hydrocodone-acetaminophen 5-325mg 5-325 mg per tablet  Commonly known as:  NORCO  Take 1 tablet by mouth every 6 (six) hours as needed for Pain.     polyethylene glycol 17 gram Pwpk  Commonly known as:  GLYCOLAX  Take 17 g by mouth daily as needed (constipation).  Replaces:  polyethylene glycol 17 gram/dose powder        CHANGE how you take these medications    furosemide 20 MG tablet  Commonly known as:  LASIX  Take 1 tablet (20 mg total) by mouth 2 (two) times daily.  What changed:  Another medication with the same name was removed. Continue taking this medication, and follow the directions you see here.     warfarin 5 MG tablet  Commonly known as:  COUMADIN  Take 5mg orally daily, except 2.5mg on Fridays  What changed:  additional  instructions        CONTINUE taking these medications    amLODIPine 5 MG tablet  Commonly known as:  NORVASC     dronabinol 5 MG capsule  Commonly known as:  MARINOL  Take 1 capsule (5 mg total) by mouth 3 (three) times daily before meals.     lisinopril 5 MG tablet  Commonly known as:  PRINIVIL,ZESTRIL  Take 1 tablet (5 mg total) by mouth once daily.     magnesium oxide 400 mg tablet  Commonly known as:  MAG-OX  Take 1 tablet (400 mg total) by mouth 2 (two) times daily.     pantoprazole 40 MG tablet  Commonly known as:  PROTONIX  Take 1 tablet (40 mg total) by mouth 2 (two) times daily before meals.     potassium chloride 10 MEQ Cpsr  Commonly known as:  MICRO-K  Take 2 capsules (20 mEq total) by mouth once daily.     pravastatin 20 MG tablet  Commonly known as:  PRAVACHOL  Take 1 tablet (20 mg total) by mouth every evening.        STOP taking these medications    ondansetron 4 MG Tbdl  Commonly known as:  ZOFRAN-ODT     polyethylene glycol 17 gram/dose powder  Commonly known as:  GLYCOLAX  Replaced by:  polyethylene glycol 17 gram Pwpk           Where to Get Your Medications      These medications were sent to [facility] - [address] [address]    Phone:  [phone]   · benzonatate 100 MG capsule  · polyethylene glycol 17 gram Pwpk     You can get these medications from any pharmacy    Bring a paper prescription for each of these medications  · hydrocodone-acetaminophen 5-325mg 5-325 mg per tablet     Information about where to get these medications is not yet available    Ask your nurse or doctor about these medications  · warfarin 5 MG tablet       Time spent on the discharge of patient: 30 minutes    Giovanni serrano DO  Cardiology  [facility]

## 2018-03-28 NOTE — PLAN OF CARE
[facility]  [address] [place] [address]   [phone] after hours        HOME  HEALTH ORDERS      Admit to Home Health    Diagnosis:   Patient Active Problem List   Diagnosis    Nonischemic cardiomyopathy    Encounter for monitoring amiodarone therapy    Essential hypertension    V-tach    Elevated PSA    Hepatitis B core antibody positive since 2012    Chronic combined systolic and diastolic congestive heart failure    Hyperbilirubinemia    Atrial tachycardia    Hyperglycemia    AICD (automatic cardioverter/defibrillator) present    LVAD (left ventricular assist device) present    Atrial fibrillation    Heart replaced by heart assist device    Anticoagulation monitoring, INR range 2-3    Subtherapeutic international normalized ratio (INR)    Constipation    Normocytic anemia    Anemia    Ileum ulcer    Melena    Pure hypercholesterolemia    Acute blood loss anemia    GI bleed    Chronic systolic congestive heart failure    Wound drainage       Patient is homebound due to:  LVAD s/p partial bowel resection    Diet: Heart Healthy Diet    Acitivities: As tolerated    Nursing:   SN to complete comprehensive assessment including routine vital signs. Instruct on disease process and s/s of complications to report to MD. Review/verify medication list sent home with the patient at time of discharge  and instruct patient/caregiver as needed. Frequency may be adjusted depending on start of care date.    Notify MD if SBP > 160 or < 90; DBP > 90 or < 50; HR > 120 or < 50; Temp > 101; Weight gain >3lbs in 1 day or 5lbs in 1 week.    LABS: INR (3/30/18)    CONSULTS:     Physical Therapy to evaluate and treat. Evaluate for home safety and equipment needs; Establish/upgrade home exercise program. Perform / instruct on therapeutic exercises, gait training, transfer training, and Range of Motion.    Occupational Therapy to  evaluate and treat. Evaluate home environment for safety and equipment needs. Perform/Instruct on transfers, ADL training, ROM, and therapeutic exercises.    Send initial Home Health orders to HTS attending physician on call.  Send follow up questions to [phone] or fax:                      Post Transplant: [phone]

## 2018-03-28 NOTE — PLAN OF CARE
Plan of care reviewed with pt and spouse. Hoping for d/c home if INR therapeutic and HH stable. VS stable. Heparin gtt maintained, xa therapeutic. Dopplers and VAD numbers WNL. No acute events at this time. Bed low and locked, call light within reach. Pt remains free from falls or injury at this time. No c/o CP or SOB. Will continue to monitor.Ryanne Rubio RN

## 2018-03-29 NOTE — PHYSICIAN QUERY
PT Name: Suman Hayden  MR #: [id number]    Physician Query Form - Nutrition Clarification     CDS/: Rashmi Iraheta RN, CCDS              Contact information: [email or web address]    This form is a permanent document in the medical record.     Query Date: March 29, 2018    By submitting this query, we are merely seeking further clarification of documentation.. Please utilize your independent clinical judgment when addressing the question(s) below.    The Medical record contains the following:   Indicators  Supporting Clinical Findings Location in Medical Record   X % of Estimated Energy Intake over a time frame from p.o., TF, or TPN % Intake of Estimated Energy Needs: 0 - 25 %    Continue current cardiac diet with Boost Plus    Discharge Planning: Adequate PO intake to prevent further weight loss on cardiac diet 3/15 RD note   X Weight Status over a time frame     X Subcutaneous Fat and/or Muscle Loss loss of subcutaneous fat, loss of muscle mass 3/15 RD note    Fluid Accumulation or Edema      Reduced  Strength     X Wt / BMI / Usual Body Weight bmi 23.1 3/15 RD note    Delayed Wound Healing / Failure to Thrive     X Acute or Chronic Illness chronic systolic heart failure secondary to non ischemic cardiomyopathy , Heart replaced by heart assist device, abla,GIB 2/19 h/p   X Medication Dronabinol 5 mg 3 x daily 3/9-3/14 mar  3/18- 3/28 mar    Treatment     X Other Small bowel with ulceration   Small bowel resection 3/14 op note     AND / ASPEN Clinical Characteristics (October 2011)  A minimum of two characteristics is recommended for diagnosing either moderate or severe malnutrition   Mild Malnutrition Moderate Malnutrition Severe Malnutrition   Energy Intake from p.o., TF or TPN. < 75% intake of estimated energy needs for less than 7 days < 75% intake of estimated energy needs for greater than 7 days < 50% intake of estimated energy needs for > 5 days   Weight Loss 1-2% in 1 month  5% in 3 months  7.5%  in 6 months  10% in 1 year 1-2 % in 1 week  5% in 1 month  7.5% in 3 months  10% in 6 months  20% in 1 year > 2% in 1 week  > 5% in 1 month  > 7.5% in 3 months  > 10% in 6 months  > 20% in 1 year   Physical Findings     None *Mild subcutaneous fat and/or muscle loss  *Mild fluid accumulation  *Stage II decubitus  *Surgical wound or non-healing wound *Mod/severe subcutaneous fat and/or muscle loss  *Mod/severe fluid accumulation  *Stage III or IV decubitus  *Non-healing surgical wound     Provider, please specify diagnosis or diagnoses associated with above clinical findings.    [X ] Mild Protein-Calorie Malnutrition  [ ] Moderate Protein-Calorie Malnutrition  [ ] Cachexia  [ ] Underweight  [ ] Other Nutritional Diagnosis (please specify): ____________________________________  [ ] Other: ________________________________  [ ] Clinically Undetermined    Please document in your progress notes daily for the duration of treatment until resolved and include in your discharge summary.

## 2018-03-29 NOTE — PROGRESS NOTES
Patient admitted 3/5- 3/28 following admission for symptomatic anemia.  PMH includes HM3 lvad, GI bleeding, and staph epi bacteremia.  During hospital stay, he eventually required a partial small bowel resection for an ileal ulcer.  He was discharged once INR was therapeutic without further bleeding complications.  INR goal remains 2-3 requiring a bridge, and he remains off ASA.  Discharge warfarin dose is 5mg/day, except 2.5mg on Fridays.

## 2018-04-03 NOTE — PROGRESS NOTES
HPI:  The patient is status post-exploratory laparotomy with small bowel resection on 3/13/18. He has 0/10 pain. He is eating well. The patient denies nausea, vomiting, fever, sweats or problems with the bowels. He developed a post op hematoma, and his incision was opened and packed during his hospital stay. His wife is currently doing packing changes daily. He has been getting home health.    PHYSICAL EXAM:  Physical Exam   Constitutional: He is oriented to person, place, and time. He appears well-developed and well-nourished. No distress.   HENT:   Head: Normocephalic and atraumatic.   Abdominal:   Soft, NTND  Incision with staples in place - covered with gauze  No open draining areas, no signs or symptoms of infection   Neurological: He is alert and oriented to person, place, and time.       A/P  68 yo male w LVAD, s/p ex lap with small bowel resection  -removed staples in clinic, replaced with steri-strips  -educated patient there is no further need to pack wound  -ok for cardiac diet  -RTC PRN

## 2018-04-09 NOTE — PROGRESS NOTES
Wife questioned and confirmed dose.  Reports having a small teaspoon of mustard greens 4/8.  No other changes reported

## 2018-04-11 NOTE — PROGRESS NOTES
doi9ng well midline wound nearly healed with no tenderness and minimal drainage    Plan DSD after betadine swab    F/u prn

## 2018-04-11 NOTE — PROCEDURES
TXP SHERRI INTERROGATIONS 4/11/2018 3/21/2018 3/21/2018 3/20/2018 3/20/2018 3/20/2018 3/19/2018   Type HeartMate3 - - - - - -   Flow 3.8 - - - - - -   Speed 5200 - - - - - -   PI 5.4 - - - - - -   Power ([place]) 3.5 - - - - - -   LSL 4800 - - - - - -   Pulsatility No Pulse Intermittent pulse Intermittent pulse Intermittent pulse No Pulse No Pulse No Pulse   }

## 2018-04-11 NOTE — LETTER
May 1, 2018    RE: Suman Hayden         MRN  [id number]            50             To Whom It May Concern:    Mr. Suman Hayden is a 67 y.o. year-old male patient at [facility] and was seen for consideration for cardiac transplantation.  He has a diagnosis of Non-Ischemic Cardiomyopathy and is s/p LVAD placement 17.  He is impaired in his exercise tolerance with a NYHA Functional Class II, an ejection fraction of 10-15%.    We feel that his functional impairment and lack of advanced options make his quality of life poor.  We feel that his only option at this time, after thorough review of all of his findings, is cardiac transplantation.  Therefore, we request you review of this case and approval for a cardiac transplant evaluation.    If we can be of any further assistance, please do not hesitate to contact us at the above address.    Sincerely,        Ortega Leos MD, [facility]  Medical Director, Pre-Transplant Program

## 2018-04-11 NOTE — PROGRESS NOTES
Per order of Dr Leos, pt to be reconsidered for OHT listing.  Will review pt's chart and see what needs to be updated as pt was previously declined for OHT.       Pt and wife informed in clinic that chart review will occur and they will be contacted with a plan.   They voiced understanding.

## 2018-04-11 NOTE — LETTER
April 11, 2018        Joe Ernst  [address] [place] [address]  Phone: [phone]  Fax: [phone]             [facility] [address] [place] [address]  Phone: [phone]   Patient: Suman Hayden   MR Number: [id number]   YOB: 1950   Date of Visit: 4/11/2018       Dear Dr. Joe Ernst    Thank you for referring Suman Hayden to me for evaluation. Attached you will find relevant portions of my assessment and plan of care.    If you have questions, please do not hesitate to call me. I look forward to following Suman Hayden along with you.    Sincerely,    Ortega Leos MD    Enclosure    If you would like to receive this communication electronically, please contact [email or web address] or [phone] to request Anafore Link access.    Anafore Link is a tool which provides read-only access to select patient information with whom you have a relationship. Its easy to use and provides real time access to review your patients record including encounter summaries, notes, results, and demographic information.    If you feel you have received this communication in error or would no longer like to receive these types of communications, please e-mail [email or web address]

## 2018-04-11 NOTE — PROGRESS NOTES
"Date of Implant with Heartmate 3 LVAD: 17    PATIENT ARRIVED IN CLINIC:  Ambulatory  Accompanied by:wife    Vitals  Doppler:83  Pulsatile:yes  Temperature, oral: 98.0  PAIN:6-7 on 0-10 pain scale,   location of pain:   abd incision,   description of pain:  na  Is patient currently on medications for pain?yes   What kind? norco  Does this help relieve the pain? yes    VAD Interrogation:  TXP SHERRI INTERROGATIONS 2018   Type HeartMate3   Flow 3.8   Speed 5200   PI 5.4   Power ([place]) 3.5   LSL 4800   Pulsatility No Pulse     History Lo.c3e  HCT:32.8    Problems / Issues / Alarms with VAD if any:no alarms   Any Equipment Issues? (Refer to equipment coordinators note for complete details):refer to Silverio Mccord note.  Monthly checklist not documented on since implant.  Silverio to educate.   Emergency Equipment With Patient:yes   VAD Binder With Patient: yes   Reviewed VAD Numbers In Binder:yes  VAD Sounds: HM3 sound   LVAD Dressing/DLES:  Appearance Of Driveline:"1"  Antibiotics:NO  Velour:no  Frequency of Dressing Changes:daily with soap and water.  Explained they can clean every other day  With soap and water if they would like  Stabilization Device In Use: YES delgado pepe, placed up high on abd,     Manual & Visual Inspection Of Driveline:  2 large kinks noted to his driveline just near modular cable check. No TEARS NOTED   Modular Cable Connection Intact(no yellow exposed): YES    Attempted to unscrew modular cable to ensure it will be able to come lose in the event we ever need to change the modular cable while pt held the driveline in place so it would not move. Modular cable connection able to be unscrewed and re-tightened.  Instructed pt to perform this weekly.     Pt In Need Of Management Kits?:yes. 1 month soap and water supplies ordered today  It is medically necessary to have VAD management kits in order to prevent infection or to assist in the healing of an infected DLES.    Assessment: "   Complaints/reason for visit today: Routine follow up  Complaints Of Nausea / Vomiting:denies   Appearance and Frequency Of Stools:denies blood in stool  Patient reports urine is clear and yellow:  YES     Coping/Depression/Anxiety:denies  Sleep Habits:10 hrs /night  Sleep Aids:denies  Showering :NO, Reminded patient to change dressing immediately after shower and drying off.   Activity/Exercise:walking daily   Driving:no, Reminded to pull over should there be an alarm before looking down at controller.     Labs:    Chemistry        Component Value Date/Time     04/11/2018 0900     01/15/2018    K 3.9 04/11/2018 0900    K 3.8 01/15/2018     04/11/2018 0900     01/15/2018    CO2 24 04/11/2018 0900    CO2 25 01/15/2018    BUN 18 04/11/2018 0900    BUN 18 01/15/2018    CREATININE 1.0 04/11/2018 0900    CREATININE 0.9 01/15/2018    GLU 88 04/11/2018 0900        Component Value Date/Time    CALCIUM 9.8 04/11/2018 0900    CALCIUM 8.8 01/15/2018    ALKPHOS 61 04/11/2018 0900    AST 23 04/11/2018 0900    AST 17 01/15/2018    ALT 20 04/11/2018 0900    ALT 14 01/15/2018    BILITOT 0.5 04/11/2018 0900    ESTGFRAFRICA >60.0 04/11/2018 0900    EGFRNONAA >60.0 04/11/2018 0900            Magnesium   Date Value Ref Range Status   04/11/2018 2.2 1.6 - 2.6 mg/dL Final       Lab Results   Component Value Date    WBC 4.43 04/11/2018    HGB 9.8 (L) 04/11/2018    HCT 32.8 (L) 04/11/2018    MCV 86 04/11/2018     04/11/2018       Lab Results   Component Value Date    INR 2.3 (H) 04/11/2018    INR 1.9 04/09/2018    INR 2.0 04/05/2018       BNP   Date Value Ref Range Status   04/11/2018 305 (H) 0 - 99 pg/mL Final     Comment:     Values of less than 100 pg/ml are consistent with non-CHF populations.   03/28/2018 186 (H) 0 - 99 pg/mL Final     Comment:     Values of less than 100 pg/ml are consistent with non-CHF populations.   03/26/2018 153 (H) 0 - 99 pg/mL Final     Comment:     Values of less than 100 pg/ml  are consistent with non-CHF populations.       LD   Date Value Ref Range Status   04/11/2018 156 110 - 260 U/L Final     Comment:     Results are increased in hemolyzed samples.   03/28/2018 140 110 - 260 U/L Final     Comment:     Results are increased in hemolyzed samples.   03/27/2018 155 110 - 260 U/L Final     Comment:     Results are increased in hemolyzed samples.       Labs reviewed with patient: YES      Patient Satisfaction Survey completed per patient: no  (explained about signature and box to check)  Medication reconciliation: per MA.  Purple card updated today:NO  Coumadin Managed by: [facility], 2.3 today    Education: Reviewed driveline care, emergency procedures, alarms with patient.  Reminded patient never to change the controller without paging the VAD coordinator first.   Also reviewed how to get in touch with a VAD coordinator in the event of an emergency.         Plans/Needs:Mrs. Hayden states that his abdominal incision has drainage and is painful.  She is asking what to do.  I asked her to call the surgeons office and ask if they can be seen today.  They will see Dr. Tellez at 11:30 today.  Pt due for echo, will schedule for next months appt.   Of note, pt wearing adult diaper.  Did not get a chance to talk with them about if he is incontinent or not...  Cleaned up VAD binder for them.    Refer to MD note

## 2018-04-11 NOTE — PROGRESS NOTES
Subjective:   Patient ID:  Suman Hayden is a 67 y.o. male who presents for LVAD followup visit.    Implant date: 7/27/17     TXP SHERRI INTERROGATIONS 4/11/2018   Type HeartMate3   Flow 3.8   Speed 5200   PI 5.4   Power ([place] [employer]) 3.5   LSL 4800   Pulsatility No Pulse       HPI  chronic systolic heart failure secondary to non ischemic cardiomyopathy underwent Heartmate  3 LVAD implanted as DT who was admitted in March 2018 for GI bleed resulting in  ex lap with partial small bowel resection on 3/13/2018 with minimal postoperative complications.    Today, he seems to be recovering well from the abdominal surgery.  No LVAD alarms.  DLES number 1.  Driveline with kinks which wife reports have been present for a while.     Echo 11/21/17  1 - Heartmate III LVAD; speed 5200.     2 - Mild left ventricular enlargement.     3 - Severely depressed left ventricular systolic function (EF 10-15%).     4 - Eccentric hypertrophy.     5 - Impaired LV relaxation, normal LAP (grade 1 diastolic dysfunction).     6 - Biatrial enlargement.     7 - Right ventricular enlargement with mildly to moderately depressed systolic function.     8 - Trivial to mild mitral regurgitation.     9 - Trivial tricuspid regurgitation.     10 - Increased central venous pressure.     11 - Pulmonary hypertension. The estimated PA systolic pressure is 41 mmHg.   Review of Systems   Constitution: Negative for decreased appetite, weight gain and weight loss.   Cardiovascular: Negative for chest pain, dyspnea on exertion, leg swelling, near-syncope, orthopnea and palpitations.   Respiratory: Negative for cough and shortness of breath.    Musculoskeletal: Negative for myalgias.   Gastrointestinal: Negative for jaundice.       Objective:     Doppler: 83    Physical Exam   Constitutional: He is oriented to person, place, and time. He appears well-developed and well-nourished. He is active. He is not intubated.   BP (!) 83/0 (BP Location: Left arm, Patient Position:  "Sitting, BP Method: Large (Automatic)) Comment: doppler  Temp 98 °F (36.7 °C) (Oral)   Ht 5' 9" (1.753 m)   Wt 70.3 kg (155 lb)   BMI 22.89 kg/m²      HENT:   Head: Normocephalic and atraumatic. Hair is normal.   Right Ear: External ear normal.   Left Ear: External ear normal.   Nose: Nose normal. No nasal deformity. No epistaxis.  No foreign bodies.   Mouth/Throat: Mucous membranes are normal. Mucous membranes are not cyanotic. No oropharyngeal exudate.   Eyes: Conjunctivae and EOM are normal. Pupils are equal, round, and reactive to light.   Neck: Neck supple. No hepatojugular reflux and no JVD present.   Cardiovascular: Normal rate, regular rhythm, normal heart sounds and normal pulses.  Exam reveals no gallop.    Pulmonary/Chest: Effort normal and breath sounds normal. No apnea and no tachypnea. He is not intubated. No respiratory distress. He exhibits no tenderness.   Abdominal: Soft. Normal appearance and bowel sounds are normal. There is no tenderness. No hernia.   Musculoskeletal: Normal range of motion.   Neurological: He is alert and oriented to person, place, and time. He displays no seizure activity.   Skin: Skin is warm, dry and intact. No rash noted. No pallor.   Psychiatric: He has a normal mood and affect. His speech is normal and behavior is normal. Thought content normal. Cognition and memory are normal.       Lab Results   Component Value Date    WBC 4.43 04/11/2018    HGB 9.8 (L) 04/11/2018    HCT 32.8 (L) 04/11/2018    MCV 86 04/11/2018     04/11/2018    CO2 27 03/28/2018    CREATININE 0.8 03/28/2018    CALCIUM 9.3 03/28/2018    BILIRUTOT 0.3 01/15/2018    ALBUMIN 3.0 (L) 03/28/2018    AST 19 03/28/2018     (H) 03/28/2018    ALT 13 03/28/2018     03/28/2018       Lab Results   Component Value Date    INR 2.3 (H) 04/11/2018    INR 1.9 04/09/2018    INR 2.0 04/05/2018       BNP   Date Value Ref Range Status   03/28/2018 186 (H) 0 - 99 pg/mL Final     Comment:     Values of " less than 100 pg/ml are consistent with non-CHF populations.   03/26/2018 153 (H) 0 - 99 pg/mL Final     Comment:     Values of less than 100 pg/ml are consistent with non-CHF populations.   03/23/2018 109 (H) 0 - 99 pg/mL Final     Comment:     Values of less than 100 pg/ml are consistent with non-CHF populations.       LD   Date Value Ref Range Status   03/28/2018 140 110 - 260 U/L Final     Comment:     Results are increased in hemolyzed samples.   03/27/2018 155 110 - 260 U/L Final     Comment:     Results are increased in hemolyzed samples.   03/26/2018 152 110 - 260 U/L Final     Comment:     Results are increased in hemolyzed samples.         Assessment:      1. Heart replaced by heart assist device        Plan:   Patient is now NYHA class II. BP is controlled.  INR is therapeutic.  VAD interrogation was performed in clinic  Will change dressing every other day  Most recent echo report noted in HPI  UPDATED plan /followup noted in patient instruction / followup section in addition to being note below  Any VAD management kits dispensed today medically necessary   2D echo with CFD ordered  Recommend 2 gram sodium restriction and 1500cc fluid restriction.  Encourage physical activity with graded exercise program.  Requested patient to weigh themselves daily, and to notify us if their weight increases by more than 3 lbs in 1 day or 5 lbs in 1 week.   Follow-up in about 1 month (around 5/11/2018) for with echo (ordered by Dr Downing in Jan 2018).    isted for transplant: No  Ferina to start reviewing what needs to be done for workup to resume

## 2018-04-20 NOTE — PROGRESS NOTES
Per Flora from [facility] reports specimen was not good 4/19, they spoke with NIKKI zimmerman for labs  redrawn 4/20 but patient will not be home today 4/20. Orders have been faxed for redrawn 4/23.

## 2018-04-23 NOTE — PROGRESS NOTES
Wife was given lab result, verified correct dose of coumadin, reports no changes, wife was advised of coumadin instructions and redraw date, order faxed to [facility]

## 2018-05-08 NOTE — PROGRESS NOTES
Verbal result taken from ___Odell______. PT/INR __31.4/3.1_____ Date drawn__5/7/18______ Hardcopy to be faxed.

## 2018-05-08 NOTE — PROGRESS NOTES
Wife questioned and confirmed dose .  Ensure x3 daily.  Pt taken 5 mg on 5/4 by getting pills mixed up .

## 2018-05-08 NOTE — TELEPHONE ENCOUNTER
Informed pt's wife that financial clearance for updated eval has been received.  Will plan to have pt sign updated consent form and have updated SW evaluation tomorrow at LVAD clinic.  Updated labs linked.    will try to add CT scans for tomorrow.

## 2018-05-09 NOTE — PROGRESS NOTES
"Date of Implant with Heartmate 3 LVAD: 17    PATIENT ARRIVED IN CLINIC:  Ambulatory  Accompanied by:Wife    Vitals  Doppler:72  Pulsatile:yes  Temperature, oral: 98.0  PAIN:0 on 0-10 pain scale,   location of pain:   na,   description of pain:  na  Is patient currently on medications for pain?no   What kind? na  Does this help relieve the pain? na    VAD Interrogation:  TXP SHERRI INTERROGATIONS 2018   Type HeartMate3   Flow 4.0   Speed 5200   PI 4.1   Power ([place]) 3.7   LSL 4800   Pulsatility Pulse       Flow in history: 3.5-4.4  History Lo.c3e  HCT:29.5    Problems / Issues / Alarms with VAD if any:none   Any Equipment Issues? (Refer to equipment coordinators note for complete details):none  Emergency Equipment With Patient:yes   VAD Binder With Patient: yes   Reviewed VAD Numbers In Binder:yes  VAD Sounds: HM3 sound   LVAD Dressing/DLES:  Appearance Of Driveline:"1"  Antibiotics:NO  Velour:no  Frequency of Dressing Changes:Every other day with soap and water     Stabilization Device In Use: YES Singer anchor    Manual & Visual Inspection Of Driveline:  NO KINKS OR TEARS NOTED   Modular Cable Connection Intact(no yellow exposed): YES    Attempted to unscrew modular cable to ensure it will be able to come lose in the event we ever need to change the modular cable while pt held the driveline in place so it would not move. Modular cable connection able to be unscrewed and re-tightened.  Instructed pt to perform this weekly.     Pt In Need Of Management Kits?:yes 1 box of soap and water  It is medically necessary to have VAD management kits in order to prevent infection or to assist in the healing of an infected DLES.    Assessment:   Complaints/reason for visit today: Routine follow up  Complaints Of Nausea / Vomiting:none   Appearance and Frequency Of Stools:normal and formed without bloo daily  Patient reports urine is clear and yellow:  YES     Coping/Depression/Anxiety:patient coping ok  Sleep " Habits:8-10 hrs /night  Sleep Aids:none  Showering :NO, Reminded patient to change dressing immediately after shower and drying off.   Activity/Exercise:walking, leg raises   Driving:no, Reminded to pull over should there be an alarm before looking down at controller.     Labs:    Chemistry        Component Value Date/Time     05/09/2018 0838     01/15/2018    K 4.1 05/09/2018 0838    K 3.8 01/15/2018     05/09/2018 0838     01/15/2018    CO2 28 05/09/2018 0838    CO2 25 01/15/2018    BUN 20 05/09/2018 0838    BUN 18 01/15/2018    CREATININE 1.2 05/09/2018 0838    CREATININE 0.9 01/15/2018    GLU 91 05/09/2018 0838        Component Value Date/Time    CALCIUM 10.4 05/09/2018 0838    CALCIUM 8.8 01/15/2018    ALKPHOS 65 05/09/2018 0838    AST 20 05/09/2018 0838    AST 17 01/15/2018    ALT 14 05/09/2018 0838    ALT 14 01/15/2018    BILITOT 0.6 05/09/2018 0838    ESTGFRAFRICA >60.0 05/09/2018 0838    EGFRNONAA >60.0 05/09/2018 0838            Magnesium   Date Value Ref Range Status   05/09/2018 2.1 1.6 - 2.6 mg/dL Final       Lab Results   Component Value Date    WBC 4.78 05/09/2018    HGB 9.2 (L) 05/09/2018    HCT 29.5 (L) 05/09/2018    MCV 81 (L) 05/09/2018     05/09/2018       Lab Results   Component Value Date    INR 2.9 (H) 05/09/2018    INR 3.1 05/07/2018    INR 2.8 04/30/2018       BNP   Date Value Ref Range Status   05/09/2018 403 (H) 0 - 99 pg/mL Final     Comment:     Values of less than 100 pg/ml are consistent with non-CHF populations.   04/11/2018 305 (H) 0 - 99 pg/mL Final     Comment:     Values of less than 100 pg/ml are consistent with non-CHF populations.   03/28/2018 186 (H) 0 - 99 pg/mL Final     Comment:     Values of less than 100 pg/ml are consistent with non-CHF populations.       LD   Date Value Ref Range Status   05/09/2018 164 110 - 260 U/L Final     Comment:     Results are increased in hemolyzed samples.   04/11/2018 156 110 - 260 U/L Final     Comment:      Results are increased in hemolyzed samples.   03/28/2018 140 110 - 260 U/L Final     Comment:     Results are increased in hemolyzed samples.       Labs reviewed with patient: YES      Patient Satisfaction Survey completed per patient: no  (explained about signature and box to check)  Medication reconciliation: per MA.  Purple card updated today:YES   Coumadin Managed by: [facility],     Education: Reviewed driveline care, emergency procedures, alarms with patient.  Reminded patient never to change the controller without paging the VAD coordinator first.   Also reviewed how to get in touch with a VAD coordinator in the event of an emergency.         Plans/Needs:Patient presents today for routine followup. Patient doing well with no issues at this time. No changes today per MD. Patient would like to start fishing and educated on safe techniques per Ashley Kiran RN. Patient to RTC around one month on same day as ID and appointment with Dr. Downing. Refer to MD note.

## 2018-05-09 NOTE — LETTER
May 9, 2018        Joe Ernst  [address] [place] [address]  Phone: [phone]  Fax: [phone]             [facility] [address] [place] [address]  Phone: [phone]   Patient: Suman Hayden   MR Number: [id number]   YOB: 1950   Date of Visit: 5/9/2018       Dear Dr. Joe Ernst    Thank you for referring Suman Hayden to me for evaluation. Attached you will find relevant portions of my assessment and plan of care.    If you have questions, please do not hesitate to call me. I look forward to following Suman Hayden along with you.    Sincerely,    Angie Torres MD    Enclosure    If you would like to receive this communication electronically, please contact [email or web address] or [phone] to request [employer] access.    [employer] is a tool which provides read-only access to select patient information with whom you have a relationship. Its easy to use and provides real time access to review your patients record including encounter summaries, notes, results, and demographic information.    If you feel you have received this communication in error or would no longer like to receive these types of communications, please e-mail [email or web address]

## 2018-05-09 NOTE — PROGRESS NOTES
EDUCATION NOTE:    Suman Hayden was seen today for pre-heart transplant education.  Patient signed wellness contract and informed consent to undergo heart transplant evaluation work-up.  Thorough pre-transplant education conducted.      Information presented included:  · Evaluation process  · Members of the transplant team  · Selection committee members and role of the committee  · Listing process for transplant  · Different listing designations, including status 7  · 1-year graft survival statistics  · LVAD as bridge to transplant or DT  · Need to reach patient within 15 minutes of donor offer  · CDC high risk donors  · Blood transfusions  · Process for matching donor with recipient  · Need for weight loss and how it relates to the wait time  · Post-transplant immunosuppression for life with need to be able to afford post-transplant medications  · Need for a caregiver to be with them at all times beginning with discharge from ICU, through at least the first 6 weeks post-transplant  · Need to find local housing for the first 6 weeks post-transplant  · How to reach team members at any time  · [employer] website with written instructions regarding how to look up information [facility]    Patient was accompanied by wife.  Patient asked pertinent questions, which were answered to their satisfaction.  Patient was also given a copy of the wellness contract and informed consent to undergo evaluation work-up.

## 2018-05-09 NOTE — PROGRESS NOTES
Transplant Recipient Adult Psychosocial Assessment    Encounter Date: 5/9/2018    Suman Hayden  [address]  [place] [address]    Telephone Information:   Mobile [phone]   Home  [phone] (home)  E-mail  [email or web address]    Sex: male  YOB: 1950  Age: 67 y.o.    [place] Citizen: yes  Primary Language: English   Needed: no    Emergency Contact:  Name: Kia Hayden  Relationship: wife  Address: with pt  Phone Numbers: [phone]    Family/Social Support:   Number of dependents/: pt denies  Marital history: Pt and wife  46 years; no previous marriages  Other family dynamics: Pt reports large, supportive family. Pt has 2 adult children: son Boni 36, lives with pt and wife. Pt's dgtr Rayna age 45, lives [place]. Both children are supportive and involved in pt's care. Pt's sister Yumiko (age 72) and her sons are also supportive. Pt has support of his [employer] community as well.    Household Composition:  Name: Kia Hayden  Age: 64  Relationship: wife  Does person drive? yes    Name: Boni Hayden,  [phone]  Age: 37  Relationship: son  Does person drive? yes    No pets in pt's home at this time.    Do you and your caregivers have access to reliable transportation? yes     PRIMARY CAREGIVER: Kia Hayden, pt's wife, age 64, drives, will be primary caregiver, phone number [phone].      provided in-depth information to patient and caregiver regarding pre- and post-transplant caregiver role.   strongly encourages patient and caregiver to have concrete plan regarding post-transplant care giving, including back-up caregiver(s) to ensure care giving needs are met as needed.    Caregiver states understanding all aspects of caregiver role/commitment and is able/willing/committed to being caregiver to the fullest extent necessary.    Patient and Caregiver verbalizes understanding of the education provided today and caregiver  responsibilities.         remains available. Patient and Caregiver agree to contact  in a timely manner if concerns arise.      Able to take time off work without financial concerns: yes, currently not working to care for pt.     Additional Significant Others who will Assist with Transplant:  Name: William Frazier,  to pt's dgtr Rayna  Age: 48  City/State: [place]  Relationship: son in law  Does person drive? yes   Work: , [employer]  Phone: [phone] (Rayna)    Living Will: no  Healthcare Power of : no  Advance Directives on file: <<no information> per medical record.  Verbally reviewed LW/HCPA information.   provided patient with copy of LW/HCPA documents and provided education on completion of forms.    Highest Education Level: High School (9-12) or GED  Reading Ability: pt reports difficulty reading; wife assists  Reports difficulty with: N/A  Learns Best By:  Visual/hands-on     Status: no  VA Benefits: no     Working for Income: No  If no, reason not working: Patient Choice - Retired  Patient is retired from working as a  in [place] for 35 years..    Spouse/Significant Other Employment: not currently working due to caring for pt    Disabled: no    Monthly Income:  SS custodial: $912 (had been $1650).     Pt's wife reports they have $549/month house note, $620/month in car insurance on two vehicles, utility bills of about $177/month. Stated expenses total $1,346 and does not include food. This would be a monthly shortfall of over $400.00     Wife reports son who lives with them will assist with some expenses, and she and pt will apply for SNAP. Family can help some but not very much.     Pt's wife explained that the BC[employer] supplement covers them both. Per SW suggestion, wife states she will 1. Find out if plan can be changed to cover only herself and 2. Find out if pt qualifies for  Medicaid. SW also suggested if they sold one of their cars they would have less car insurance cost. Wife states understanding and will look into these options.    Able to afford all costs now and if transplanted, including medications: pt and wife express concerns about finances at this time.    Patient and Caregiver verbalizes understanding of personal responsibilities related to transplant costs and the importance of having a financial plan to ensure that patients transplant costs are fully covered.      provided fundraising information/education.  Patient and Caregiver verbalizes understanding.   remains available.    Insurance:   Payor/Plan Subscr  Sex Relation Sub. Ins. ID Effective Group Num   1. MEDICARE - ME* MIRTA LOPEZ 1950 Male  [id number] 6/1/15                                    [address]   2. BLUE CROSS BL* MIRTA LOPEZ 1950 Male  [id number] 1/1/10 [id number]                                   [address] [address]     Primary Insurance (for UNOS reporting): Public Insurance - Medicare FFS (Fee For Service)  Secondary Insurance (for UNOS reporting): Private Insurance     Patient and Caregiver verbalizes clear understanding that patient may experience difficulty obtaining and/or be denied insurance coverage post-surgery. This includes and is not limited to disability insurance, life insurance, health insurance, burial insurance, long term care insurance, and other insurances.    Patient and Caregiver also reports understanding that future health concerns related to or unrelated to transplantation may not be covered by patient's insurance.  Resources and information provided and reviewed.      Patient and Caregiver provides verbal permission to release any necessary information to outside resources for patient care and discharge planning.  Resources and information provided are reviewed.      Infusion Service: patient utilizing? no, had [facility] in past for    Home Health: patient utilizing? yes, has [facility] 1x week for INR check  DME: no  Pulmonary/Cardiac Rehab: no  ADLS:  Pt reports independence with ADLS and does drive    Adherence:   Pt reports high level of adherence to pt's medical regimen.  Adherence education and counseling provided.     Per History Section:  Past Medical History:   Diagnosis Date    Arthritis     Cardiomyopathy     CHF (congestive heart failure)     Coronary artery disease     Encounter for blood transfusion     Gastric polyp     Hyperlipidemia     Hypertension     Hypotension, iatrogenic     Obesity     S/P implantation of automatic cardioverter/defibrillator (AICD)     Ventricular tachycardia      Social History   Substance Use Topics    Smoking status: Never Smoker    Smokeless tobacco: Never Used    Alcohol use No     History   Drug Use No     History   Sexual Activity    Sexual activity: Not Currently       Per Today's Psychosocial:  Tobacco: none, patient denies any use.  Alcohol: none, patient denies any use.  Illicit Drugs/Non-prescribed Medications: none, patient denies any use.    Patient and Caregiver states clear understanding of the potential impact of substance use as it relates to transplant candidacy and is aware of possible random substance screening.  Substance abstinence/cessation counseling, education and resources provided and reviewed.     Arrests/DWI/Treatment/Rehab: patient denies    Psychiatric History:    Mental Health: pt denies  Psychiatrist/Counselor: pt denies  Medications:  pt denies  Suicide/Homicide Issues: pt denies SI/HI at this time and in the past.   Safety at home: no concerns voiced or indicated at this time.    Knowledge: Patient and Caregiver states having clear understanding and realistic expectations regarding the potential risks and potential benefits of organ transplantation and organ donation, agrees to discuss with health care team members and support system members and to  utilize available resources and express questions and/or concerns in order to further facilitate the pt informed decision-making. Resources and information provided and reviewed.     Patient and Caregiver is aware of [facility]'s affiliation and/or partnership with agencies in home health care, LTAC, SNF, [facility], and other hospitals and clinics.    Understanding: Patient and Caregiver reports having a clear understanding of the many lifetime commitments involved with being a transplant recipient, including costs, compliance, medications, lab work, procedures, appointments, concrete and financial planning, preparedness, timely and appropriate communication of concerns, abstinence (ETOH, tobacco, illicit non-prescribed drugs), adherence to all health care team recommendations, support system and caregiver involvement, appropriate and timely resource utilization and follow-through, mental health counseling as needed/recommended, and patient and caregiver responsibilities.  Social Service Handbook, resources and detailed educational information provided and reviewed.  Educational information provided.    Patient and Caregiver also reports current and expected compliance with health care regime and states having a clear understanding of the importance of compliance.      Patient and Caregiver reports a clear understanding that risks and benefits may be involved with organ transplantation and with organ donation.      Patient and Caregiver also reports clear understanding that psychosocial risk factors may affect patient, and include but are not limited to feelings of depression, generalized anxiety, anxiety regarding dependence on others, post traumatic stress disorder, feelings of guilt and other emotional and/or mental concerns, and/or exacerbation of existing mental health concerns.  Detailed resources provided and discussed.     Patient and Caregiver agrees to access appropriate resources in a timely manner as needed  and/or as recommended, and to communicate concerns appropriately. Patient and Caregiver also reports a clear understanding of treatment options available.      reviewed education, provided additional information, and answered questions.    Feelings or Concerns: Pt reports no concerns at this time.    Coping: Pt reports coping well and reports support of family, kayli and Buddhist.     Goals: Pt reports wants to live with wife, watch grandchildren grown up and enjoy their pets.    Interview Behavior: Patient and Caregiver presents as alert and oriented x 4, pleasant, good eye contact, calm, communicative, cooperative and asking and answering questions appropriately.        Transplant Social Work - Candidacy  Assessment/Plan:     Psychosocial Suitability: Patient presents as a suitable candidate for transplant pending pt and wife are able to adjust expenses, including applying for Medicaid for pt, looking into taking pt off supplement (and keeping it for wife only), selling one vehicle and applying for SNAP. Based on psychosocial risk factors, patient presents as low risk, due to demonstrated ability to manage challenges of LVAD, strong support system, good coping. Pt is high risk until the above conditions have been addressed.    Recommendations/Additional Comments: SW follow-up with pt at next clinic appointment.    Stephanie Motley LCSW

## 2018-05-09 NOTE — PROCEDURES
TXP SHERRI INTERROGATIONS 5/9/2018 4/11/2018 3/21/2018 3/21/2018 3/20/2018 3/20/2018 3/20/2018   Type HeartMate3 HeartMate3 - - - - -   Flow 4.0 3.8 - - - - -   Speed 5200 5200 - - - - -   PI 4.1 5.4 - - - - -   Power ([place]) 3.7 3.5 - - - - -   LSL 4800 4800 - - - - -   Pulsatility Pulse No Pulse Intermittent pulse Intermittent pulse Intermittent pulse No Pulse No Pulse   }

## 2018-05-09 NOTE — PROGRESS NOTES
"Subjective:   Patient ID:  Suman Hayden is a 67 y.o. male who presents for LVAD followup visit.      Implant Date 7/27/17:   VAD Type : HM II   VAD speed is at 5200 rpm.   Implanted for BTT :     No VAD alarms noted on interrogation occasional PI events .     Doppler 72   DLES is a "1".  Driveline with kinks which wife reports have been present for a while.    HM 3 RPM: 5200    INR is therapeutic at 2.9  .   LDh is at baseline 164.     TXP SHERRI INTERROGATIONS 5/9/2018   Type HeartMate3   Flow 4.0   Speed 5200   PI 4.1   Power ([place]) 3.7   LSL 4800   Pulsatility Pulse       HPI   HPI  chronic systolic heart failure secondary to non ischemic cardiomyopathy underwent Heartmate  3 LVAD @5200  who was admitted in March 2018 for GI bleed (GI Bleed: 8/17, 12/17, 1/18)  resulting in  ex lap with partial small bowel resection on 3/13/2018 with minimal postoperative complications.  Today, he seems to be recovering well from the abdominal surgery. No blood or dark stools. Bowl movements daily.        Echo 11/21/17  1 - Heartmate III LVAD; speed 5200.     2 - Mild left ventricular enlargement.     3 - Severely depressed left ventricular systolic function (EF 10-15%).     4 - Eccentric hypertrophy.     5 - Impaired LV relaxation, normal LAP (grade 1 diastolic dysfunction).     6 - Biatrial enlargement.     7 - Right ventricular enlargement with mildly to moderately depressed systolic function.     8 - Trivial to mild mitral regurgitation.     9 - Trivial tricuspid regurgitation.     10 - Increased central venous pressure.     11 - Pulmonary hypertension. The estimated PA systolic pressure is 41 mmHg.     Review of Systems   Constitution: Negative for chills, decreased appetite, fever and weakness.   Cardiovascular: Negative for chest pain, dyspnea on exertion, irregular heartbeat, leg swelling, orthopnea, palpitations, paroxysmal nocturnal dyspnea and syncope.   Respiratory: Negative for cough, shortness of breath, sputum " "production and wheezing.    Skin: Negative for poor wound healing.   Gastrointestinal: Negative for bloating, abdominal pain, constipation, diarrhea, nausea and vomiting.   Psychiatric/Behavioral: Negative for altered mental status.       Objective:   Blood pressure (!) 72/0, temperature 98 °F (36.7 °C), temperature source Oral, height 5' 9" (1.753 m), weight 75.3 kg (166 lb).body mass index is 24.51 kg/m².      Physical Exam   Constitutional: He is oriented to person, place, and time. He appears well-developed and well-nourished. No distress.   HENT:   Head: Normocephalic and atraumatic.   Nose: Nose normal.   Mouth/Throat: Oropharynx is clear and moist. No oropharyngeal exudate.   Eyes: Conjunctivae and EOM are normal. Pupils are equal, round, and reactive to light. No scleral icterus.   Neck: Normal range of motion. Neck supple. No JVD present. No tracheal deviation present. No thyromegaly present.   Cardiovascular: Normal rate, regular rhythm, S1 normal, S2 normal and normal heart sounds.  Exam reveals no gallop and no friction rub.    No murmur heard.  lvad hum throughout precordium, consistent with HM3   Pulmonary/Chest: Effort normal and breath sounds normal. No respiratory distress. He has no wheezes. He has no rales. He exhibits no tenderness.   Abdominal: Soft. Bowel sounds are normal. He exhibits no distension and no mass. There is no tenderness. There is no rebound and no guarding.   Musculoskeletal: Normal range of motion. He exhibits no edema or tenderness.   Neurological: He is alert and oriented to person, place, and time. Coordination normal.   Skin: Skin is warm and dry. No rash noted. He is not diaphoretic. No erythema. No pallor.   Psychiatric: He has a normal mood and affect. His behavior is normal. Judgment and thought content normal.   Nursing note and vitals reviewed.      Lab Results   Component Value Date    WBC 4.78 05/09/2018    HGB 9.2 (L) 05/09/2018    HCT 29.5 (L) 05/09/2018    MCV 81 " (L) 05/09/2018     05/09/2018    CO2 28 05/09/2018    CREATININE 1.2 05/09/2018    CALCIUM 10.4 05/09/2018    BILIRUTOT 0.3 01/15/2018    ALBUMIN 4.0 05/09/2018    AST 20 05/09/2018     (H) 05/09/2018    ALT 14 05/09/2018     05/09/2018       Lab Results   Component Value Date    INR 2.9 (H) 05/09/2018    INR 3.1 05/07/2018    INR 2.8 04/30/2018       BNP   Date Value Ref Range Status   05/09/2018 403 (H) 0 - 99 pg/mL Final     Comment:     Values of less than 100 pg/ml are consistent with non-CHF populations.   04/11/2018 305 (H) 0 - 99 pg/mL Final     Comment:     Values of less than 100 pg/ml are consistent with non-CHF populations.   03/28/2018 186 (H) 0 - 99 pg/mL Final     Comment:     Values of less than 100 pg/ml are consistent with non-CHF populations.       LD   Date Value Ref Range Status   05/09/2018 164 110 - 260 U/L Final     Comment:     Results are increased in hemolyzed samples.   04/11/2018 156 110 - 260 U/L Final     Comment:     Results are increased in hemolyzed samples.   03/28/2018 140 110 - 260 U/L Final     Comment:     Results are increased in hemolyzed samples.       Labs were reviewed with the patient.    Assessment:      1. V-tach    2. Heart replaced by heart assist device    3. LVAD (left ventricular assist device) present    4. Chronic systolic congestive heart failure    5. Persistent atrial fibrillation        Plan:     Overall doing excellent     [place] / ID on follow up .   Resigned consent for eval     Patient is now NYHA II  Recommend 2 gram sodium restriction and 1500cc fluid restriction.  Encourage physical activity with graded exercise program.  Requested patient to weigh themselves daily, and to notify us if their weight increases by more than 3 lbs in 1 day or 5 lbs in 1 week.     Listed for transplant: no pending re-presentation     UNOS Patient Status  Functional Status: 90% - Able to carry on normal activity: minor symptoms of disease  Physical  Capacity: No Limitations  Working for Income: Unknown      Addendum:     I have personally taken the history and examined this patient and agree with the fellow's note as stated above.  Patient has been gaining weight, doing very well from GI standpoint. No bleeding, no other issues it seems since going home from abdominal surgery.   Appears patient is interested in getting evaluated for OHT, consents signed again.   Blood pressure stable, no changes to medications for now.   Due for repeat echo, appears it was ordered for [place], will need to be performed at [facility] when he returns.     Angie Torres MD  [facility] Staff

## 2018-05-15 NOTE — PROGRESS NOTES
Pt presented for an echocardiogram today.  This study was performed in conjunction with Optison contrast agent because of poor endocardial visualization.  Procedure was explained to the patient, he verbalized understanding and signed the consent.  IV, 24ga x 1 attempts, was started in the left AC using aseptic technique.  Administered a total of 3 ml of Optison (lot # [id number], expiration date 08/21/2019).  Patient tolerated the procedure well.  IV discontinued, pressure dressing applied.

## 2018-05-20 NOTE — ED PROVIDER NOTES
"SCRIBE #1 NOTE: I, Kinzagerard Moody, am scribing for, and in the presence of, Bon Barger Jr., MD. I have scribed the entire note.      History      Chief Complaint   Patient presents with    Chest Pain     pt reports his defibrilator went off this morning, LVAD pt       Review of patient's allergies indicates:   Allergen Reactions    Pneumococcal 23-killian ps vaccine         HPI   HPI    5/20/2018, 12:00 PM   History obtained from the patient and Pt's wife      History of Present Illness: Suman Hayden is a 67 y.o. male patient with a PMHx of CHF, CAD, AICD, LVAD, and HTN who presents to the Emergency Department for evaluation of LVAD problem which onset this morning. Pt reports his LVAD was "beeping" this morning, so he spoke to his EP doctor in [place] and was referred to ED for evaluation of LVAD. Pt reports his LVAD is not shocking him. No associated sxs reported. Patient denies any fever, CP, SOB, diaphoresis, palpitations, extremity weakness/numbness, leg pain/swelling, dizziness, cough, n/v, and all other sxs at this time. Pt's LVAD is from [employer]. No further complaints or concerns at this time.           Arrival mode: Personal vehicle    PCP: Joe Ernst MD       Past Medical History:  Past Medical History:   Diagnosis Date    Arthritis     Cardiomyopathy     CHF (congestive heart failure)     Coronary artery disease     Encounter for blood transfusion     Gastric polyp     Hyperlipidemia     Hypertension     Hypotension, iatrogenic     Obesity     S/P implantation of automatic cardioverter/defibrillator (AICD)     Ventricular tachycardia        Past Surgical History:  Past Surgical History:   Procedure Laterality Date    ABDOMINAL SURGERY      CARDIAC CATHETERIZATION      CARDIAC DEFIBRILLATOR PLACEMENT      CARDIAC DEFIBRILLATOR PLACEMENT      COLONOSCOPY      COLONOSCOPY N/A 3/9/2018    Procedure: COLONOSCOPY;  Surgeon: Andres Chatterjee MD;  [facility] " " Service: Endoscopy;  Laterality: N/A;    ESOPHAGOGASTRODUODENOSCOPY      LEFT VENTRICULAR ASSIST DEVICE  07/27/2017         Family History:  Family History   Problem Relation Age of Onset    Heart disease Mother     Heart disease Father        Social History:  Social History     Social History Main Topics    Smoking status: Never Smoker    Smokeless tobacco: Never Used    Alcohol use No    Drug use: No    Sexual activity: Not Currently       ROS   Review of Systems   Constitutional: Negative for diaphoresis and fever.        (+) LVAD problem ("beeping")   HENT: Negative for sore throat.    Respiratory: Negative for cough and shortness of breath.    Cardiovascular: Negative for chest pain, palpitations and leg swelling.   Gastrointestinal: Negative for nausea and vomiting.   Genitourinary: Negative for dysuria.   Musculoskeletal: Negative for back pain.   Skin: Negative for rash.   Neurological: Negative for dizziness, weakness and numbness.   Hematological: Does not bruise/bleed easily.   All other systems reviewed and are negative.    Physical Exam      Initial Vitals [05/20/18 1155]   BP Pulse Resp Temp SpO2   110/74 81 18 98.5 °F (36.9 °C) 99 %      MAP       86          Physical Exam  Nursing Notes and Vital Signs Reviewed.  Constitutional: Patient is in no apparent distress. Well-developed and well-nourished.  Head: Atraumatic. Normocephalic.  Eyes: PERRL. EOM intact. Conjunctivae are not pale. No scleral icterus.  ENT: Mucous membranes are moist. Oropharynx is clear and symmetric.    Neck: Supple. Full ROM. No lymphadenopathy.  Cardiovascular: Regular rate. Regular rhythm. No murmurs, rubs, or gallops. Distal pulses are 2+ and symmetric.  Pulmonary/Chest: No respiratory distress. Clear to auscultation bilaterally. No wheezing or rales.  Abdominal: Soft and non-distended.  There is no tenderness.  No rebound, guarding, or rigidity.  Musculoskeletal: Moves all extremities. No obvious deformities. No " "edema. No calf tenderness.  Skin: Warm and dry.  Neurological:  Alert, awake, and appropriate.  Normal speech.  No acute focal neurological deficits are appreciated.  Psychiatric: Normal affect. Good eye contact. Appropriate in content.    ED Course    Procedures  ED Vital Signs:  Vitals:    05/20/18 1155 05/20/18 1232   BP: 110/74 101/85   Pulse: 81 80   Resp: 18 12   Temp: 98.5 °F (36.9 °C)    TempSrc: Oral    SpO2: 99% 100%   Weight: 77.6 kg (171 lb)    Height: 5' 9" (1.753 m)        Abnormal Lab Results:  Labs Reviewed   CBC W/ AUTO DIFFERENTIAL - Abnormal; Notable for the following:        Result Value    WBC 3.80 (*)     RBC 3.83 (*)     Hemoglobin 9.3 (*)     Hematocrit 29.6 (*)     MCV 77 (*)     MCH 24.3 (*)     MCHC 31.4 (*)     RDW 16.6 (*)     Gran # (ANC) 1.6 (*)     All other components within normal limits   PROTIME-INR - Abnormal; Notable for the following:     Prothrombin Time 23.0 (*)     INR 2.2 (*)     All other components within normal limits   APTT - Abnormal; Notable for the following:     aPTT 34.5 (*)     All other components within normal limits   COMPREHENSIVE METABOLIC PANEL   TROPONIN I        All Lab Results:  Results for orders placed or performed during the hospital encounter of 05/20/18   CBC auto differential   Result Value Ref Range    WBC 3.80 (L) 3.90 - 12.70 K/uL    RBC 3.83 (L) 4.60 - 6.20 M/uL    Hemoglobin 9.3 (L) 14.0 - 18.0 g/dL    Hematocrit 29.6 (L) 40.0 - 54.0 %    MCV 77 (L) 82 - 98 fL    MCH 24.3 (L) 27.0 - 31.0 pg    MCHC 31.4 (L) 32.0 - 36.0 g/dL    RDW 16.6 (H) 11.5 - 14.5 %    Platelets 170 150 - 350 K/uL    MPV 10.4 9.2 - 12.9 fL    Gran # (ANC) 1.6 (L) 1.8 - 7.7 K/uL    Lymph # 1.5 1.0 - 4.8 K/uL    Mono # 0.4 0.3 - 1.0 K/uL    Eos # 0.3 0.0 - 0.5 K/uL    Baso # 0.04 0.00 - 0.20 K/uL    Gran% 42.9 38.0 - 73.0 %    Lymph% 39.7 18.0 - 48.0 %    Mono% 9.5 4.0 - 15.0 %    Eosinophil% 6.8 0.0 - 8.0 %    Basophil% 1.1 0.0 - 1.9 %    Differential Method Automated  "   Protime-INR   Result Value Ref Range    Prothrombin Time 23.0 (H) 9.0 - 12.5 sec    INR 2.2 (H) 0.8 - 1.2   APTT   Result Value Ref Range    aPTT 34.5 (H) 21.0 - 32.0 sec         Imaging Results:  Imaging Results          X-Ray Chest AP Portable (Final result)  Result time 05/20/18 12:25:09    Final result by Gasper Esteves MD (05/20/18 12:25:09)                 Impression:      Stable chest x-ray.  There is chronic mild cardiomegaly.      Electronically signed by: Richmond Esteves MD  Date:    05/20/2018  Time:    12:25             Narrative:    EXAMINATION:  XR CHEST AP PORTABLE    CLINICAL HISTORY:  Shortness of breath    TECHNIQUE:  Single frontal view of the chest was performed.    COMPARISON:  March 9, 2018    FINDINGS:  There is mild cardiomegaly.  Pacemaker remains in place.  There are sternal sutures.  There is atherosclerosis of the aorta.  The lungs appear free of any active infiltrate or effusion.                                The EKG was ordered, reviewed, and independently interpreted by the ED provider.  Interpretation time: 12:17  Rate: 81 BPM  Rhythm: suspect arm lead reversal, interpretation assumes no reversal  Interpretation: Atrial-paced rhythm with occasional AV dual-paced complexes and fusion complexes. Right bundle branch block. Anterolateral infarct, age undetermined. Abnormal ECG. No STEMI.           The Emergency Provider reviewed the vital signs and test results, which are outlined above.    ED Discussion     1:05 PM: Reassessed pt at this time.  Pt states his condition has improved at this time. Discussed with pt all pertinent ED information and results. Discussed pt dx and plan of tx. Gave pt all f/u and return to the ED instructions. All questions and concerns were addressed at this time. Pt expresses understanding of information and instructions, and is comfortable with plan to discharge. Pt is stable for discharge.    I discussed with patient and family that evaluation in the  ED does not suggest any emergent or life threatening medical conditions requiring immediate intervention beyond what was provided in the ED, and I believe patient is safe for discharge.  Regardless, an unremarkable evaluation in the ED does not preclude the development or presence of a serious of life threatening condition. As such, patient was instructed to return immediately for any worsening or change in current symptoms.    ED Medication(s):  Medications - No data to display    New Prescriptions    No medications on file             Medical Decision Making    Medical Decision Making:   Clinical Tests:   Lab Tests: Reviewed and Ordered  Radiological Study: Reviewed and Ordered  Medical Tests: Reviewed and Ordered           Scribe Attestation:   Scribe #1: I performed the above scribed service and the documentation accurately describes the services I performed. I attest to the accuracy of the note.    Attending:   Physician Attestation Statement for Scribe #1: I, Bon Barger Jr., MD, personally performed the services described in this documentation, as scribed by Tamar Moody, in my presence, and it is both accurate and complete.          Clinical Impression       ICD-10-CM ICD-9-CM   1. AICD (automatic cardioverter/defibrillator) present Z95.810 V45.02   2. SOB (shortness of breath) R06.02 786.05   3. LVAD (left ventricular assist device) present Z95.811 V43.21   4. Atrial paroxysmal tachycardia I47.1 427.0       Disposition:   Disposition: Discharged  Condition: Stable         Bon Barger Jr., MD  05/24/18 1009

## 2018-05-20 NOTE — TELEPHONE ENCOUNTER
"Patient's family member called stating his ICD has been alarming "like a siren" every day for a few days. He had lead revision in February by Dr. Winchester. Advised they go to the [place] [facility] and let them know and have the ER physician call [employer] [facility] for interrogation.  "

## 2018-05-21 NOTE — PROGRESS NOTES
Pt called 5/21 to report that patient had a ER visit 5/20 with 2.2 INR. Wife reports no changes, but stated '' that patient will not be having any more labs drawn until next Monday 5/28 because he was already stuck  '' - per wife.

## 2018-05-21 NOTE — TELEPHONE ENCOUNTER
"----- Message from Keely Treviño sent at 5/21/2018  8:36 AM CDT -----  Could you please look into this?  Spoke with patient's wife and she reports that a remote transmission was sent.  Patient isn't on our website and is followed in [place].  Thank you!    Keely  ----- Message -----  From: Mark Zarate MD  Sent: 5/21/2018   6:51 AM  To: Rubén Winchester MD, Minal Sauceda, #    Hi    Dr. Winchester's patient, Mr. Hayden, called yesterday stating his ICD was periodically making a "siren" type sound. I sent him to [facility] [place] [facility] for evaluation and interrogation but they sent him home without interrogating. Can you, Minal and Keely, look into this?    "

## 2018-05-21 NOTE — TELEPHONE ENCOUNTER
Spoke with pt, scheduled clinic check on Wednesday, May 23rd, 1pm at [facility].  Dr. Ash to evaluate interrogation and make necessary programming changes.

## 2018-05-22 NOTE — PROGRESS NOTES
Home Health SOC with [facility] [place]. Dr Jin Franklin. SN services provided.

## 2018-05-23 NOTE — PROGRESS NOTES
Rut with [facility] called to report that the INR is due 5/28 but states it's a holiday for home health and already working with a skeleton crew, Rut is requesting to move the INR lab draw to Tuesday -5/29/18, as per Gracie -Monica told Rut it was ok to draw the INR on 5/29, new order was faxed to home health office.

## 2018-05-23 NOTE — TELEPHONE ENCOUNTER
Spoke with wife, transmission not received.  Scheduled pt to bring home monitor with them on Friday, May 25th.  Will troubleshoot in clinic.

## 2018-05-23 NOTE — TELEPHONE ENCOUNTER
----- Message from Sunshine Fu RN sent at 5/23/2018  4:16 PM CDT -----  Contact: Kia wife       ----- Message -----  From: Mickie Slater  Sent: 5/23/2018   4:10 PM  To: Annabella NICE Staff    Calling concerning if you received reading from home device transmitted. Please call wife @ [phone]. Thanks, gabbie

## 2018-05-25 NOTE — PROGRESS NOTES
Pt brought home monitor from previous clinic, unable to assign that monitor to him in [facility] clinic.  Ordered new [employer] [id number], will be shipped directly to pt.  Educated pt and family on pairing procedure and instructed them to send transmission once they receive new unit.  Pt verbalized understanding.

## 2018-05-30 NOTE — PROGRESS NOTES
Wife was given lab result, verified correct dose of coumadin, reported no changes, Wife was advised of coumadin instructions and redraw date, order was faxed to [facility]

## 2018-06-05 NOTE — PROGRESS NOTES
Wife questioned and confirmed dose .  Wife stated patient rotates  between Ensure,boost, or nepro drink  x3 daily.  Oysters 6/4  Reports no other  chnages

## 2018-06-11 NOTE — LETTER
June 17, 2018      Angie Torres MD  [address]  [facility] [address]           [facility]  [address]  [facility] [address]  Phone: [phone]  Fax: [phone]          Patient: Suman Hayden   MR Number: [id number]   YOB: 1950   Date of Visit: 6/11/2018       Dear Dr. Angie Torres:    Thank you for referring Suman Hayden to me for evaluation. Attached you will find relevant portions of my assessment and plan of care.    If you have questions, please do not hesitate to call me. I look forward to following Suman Hayden along with you.    Sincerely,    America Calderón MD    Enclosure  CC:  No Recipients    If you would like to receive this communication electronically, please contact [email or web address] or [phone] to request more information on [email or web address] Link access.    For providers and/or their staff who would like to refer a patient to [facility], please contact us through our one-stop-shop provider referral line, [facility], at [phone].    If you feel you have received this communication in error or would no longer like to receive these types of communications, please e-mail [email or web address]
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
## 2018-06-11 NOTE — PROGRESS NOTES
Subjective:      Patient ID: Suman Hayden is a 67 y.o. male.    Chief Complaint:Heart Transplant Evaluation      History of Present Illness    {ID HPI BLOCKS:80859}    Review of Systems   Constitution: Negative for chills, decreased appetite, fever, weakness, malaise/fatigue, night sweats, weight gain and weight loss.   HENT: Negative for congestion, ear pain, hearing loss, hoarse voice, sore throat and tinnitus.    Eyes: Negative for blurred vision, redness and visual disturbance.   Cardiovascular: Negative for chest pain, leg swelling and palpitations.   Respiratory: Negative for cough, hemoptysis, shortness of breath and sputum production.    Hematologic/Lymphatic: Negative for adenopathy. Does not bruise/bleed easily.   Skin: Negative for dry skin, itching, rash and suspicious lesions.   Musculoskeletal: Negative for back pain, joint pain, myalgias and neck pain.   Gastrointestinal: Negative for abdominal pain, constipation, diarrhea, heartburn, nausea and vomiting.   Genitourinary: Negative for dysuria, flank pain, frequency, hematuria, hesitancy and urgency.   Neurological: Negative for dizziness, headaches, numbness and paresthesias.   Psychiatric/Behavioral: Negative for depression and memory loss. The patient does not have insomnia and is not nervous/anxious.      Objective:   Physical Exam  Assessment:       No diagnosis found.      Plan:       ***

## 2018-06-11 NOTE — PROCEDURES
TXP SHERRI INTERROGATIONS 6/11/2018 5/9/2018 4/11/2018 3/21/2018 3/21/2018 3/20/2018 3/20/2018   Type - HeartMate3 HeartMate3 - - - -   Flow 4.1 4.0 3.8 - - - -   Speed 5200 5200 5200 - - - -   PI 4.0 4.1 5.4 - - - -   Power ([place]) 3.6 3.7 3.5 - - - -   LSL 4800 4800 4800 - - - -   Pulsatility Pulse Pulse No Pulse Intermittent pulse Intermittent pulse Intermittent pulse No Pulse   }

## 2018-06-11 NOTE — PATIENT INSTRUCTIONS
· Two vaccines today- Hepatitis A and Pneumovac  · Two antibiotics- Doxycycline and Cipro for 14 days. Take all antibiotics as prescribed.  · Return to ID clinic in 4 weeks

## 2018-06-11 NOTE — PROGRESS NOTES
Pt received the Hepatitis A and Pneumovax 23 vaccination. Pt tolerated the injections well. Pt left the unit in NAD.

## 2018-06-11 NOTE — PROGRESS NOTES
"History & Physical    SUBJECTIVE:     History of Present Illness:  Patient is a 67 y.o. male presents  for evaluation as he is currently in work up for heart transplant.  He has D HFrEF due to  non ischemic cardiomyopathy.  He underwent Heartmate 3 LVAD (july 2017) @5200  who was admitted in March 2018 for GI bleed (GI Bleed: 8/17, 12/17, 1/18)  resulting in  ex lap with partial small bowel resection on 3/13/2018 with minimal postoperative complications.  Doing well.  VAD speed is at 5200 rpm.  States he feels well.  No further issues with GI bleeding. No VAD alarms per patient and spouse.   DLES is a "2". INR is therapeutic at 2.5. LDH is 161 at baseline.  He is blood group O.     Chief Complaint   Patient presents with    Consult       Review of patient's allergies indicates:   Allergen Reactions    Pneumococcal 23-killian ps vaccine        Current Outpatient Prescriptions   Medication Sig Dispense Refill    amLODIPine (NORVASC) 5 MG tablet Take 1 tablet (5 mg total) by mouth once daily. Take one tablet 5mg by mouth once a day. 90 tablet 3    benzonatate (TESSALON) 100 MG capsule Take 100 mg by mouth 3 (three) times daily as needed for Cough.      ciprofloxacin HCl (CIPRO) 500 MG tablet Take 1 tablet (500 mg total) by mouth 2 (two) times daily. 7 tablet 0    doxycycline (VIBRA-TABS) 100 MG tablet Take 1 tablet (100 mg total) by mouth 2 (two) times daily. 14 tablet 0    dronabinol (MARINOL) 5 MG capsule Take 1 capsule (5 mg total) by mouth 3 (three) times daily before meals. 90 capsule 5    furosemide (LASIX) 20 MG tablet Take 1 tablet (20 mg total) by mouth 2 (two) times daily. 60 tablet 11    HYDROcodone-acetaminophen (NORCO) 5-325 mg per tablet Take 1 tablet by mouth every 6 (six) hours as needed for Pain.      lisinopril (PRINIVIL,ZESTRIL) 5 MG tablet Take 1 tablet (5 mg total) by mouth once daily. 90 tablet 3    magnesium oxide (MAG-OX) 400 mg tablet Take 1 tablet (400 mg total) by mouth 2 (two) times " daily. 60 tablet 11    pantoprazole (PROTONIX) 40 MG tablet Take 1 tablet (40 mg total) by mouth once daily. 30 tablet 11    polyethylene glycol (GLYCOLAX) 17 gram PwPk Take 17 g by mouth daily as needed (constipation). 14 each 0    potassium chloride (MICRO-K) 10 MEQ CpSR Take 2 capsules (20 mEq total) by mouth once daily. 60 capsule 11    pravastatin (PRAVACHOL) 20 MG tablet Take 1 tablet (20 mg total) by mouth every evening. 30 tablet 11    warfarin (COUMADIN) 5 MG tablet Take 5mg orally daily, except 2.5mg on Fridays 32 tablet 5     No current facility-administered medications for this visit.        Past Medical History:   Diagnosis Date    Arthritis     Cardiomyopathy     CHF (congestive heart failure)     Coronary artery disease     Encounter for blood transfusion     Gastric polyp     Hyperlipidemia     Hypertension     Hypotension, iatrogenic     Obesity     S/P implantation of automatic cardioverter/defibrillator (AICD)     Ventricular tachycardia      Past Surgical History:   Procedure Laterality Date    ABDOMINAL SURGERY      CARDIAC CATHETERIZATION      CARDIAC DEFIBRILLATOR PLACEMENT      CARDIAC DEFIBRILLATOR PLACEMENT      COLONOSCOPY      COLONOSCOPY N/A 3/9/2018    Procedure: COLONOSCOPY;  Surgeon: Andres Chatterjee MD;  Location: [address]);  Service: Endoscopy;  Laterality: N/A;    ESOPHAGOGASTRODUODENOSCOPY      LEFT VENTRICULAR ASSIST DEVICE  07/27/2017     Family History   Problem Relation Age of Onset    Heart disease Mother     Heart disease Father      Social History   Substance Use Topics    Smoking status: Never Smoker    Smokeless tobacco: Never Used    Alcohol use No          Review of Systems     Constitution: Negative. Negative for chills, decreased appetite, diaphoresis, fever, weakness, malaise/fatigue, night sweats, weight gain and weight loss.   Eyes: Negative.    Cardiovascular: Negative for chest pain, claudication, cyanosis, dyspnea on  "exertion, irregular heartbeat, leg swelling, near-syncope, orthopnea, palpitations, paroxysmal nocturnal dyspnea and syncope.   Respiratory: Negative for cough, hemoptysis and shortness of breath.    Endocrine: Negative.    Hematologic/Lymphatic: Negative.    Skin: Negative for color change, dry skin and nail changes.   Musculoskeletal: Negative.    Gastrointestinal: Negative.    Genitourinary: Negative.        OBJECTIVE:     Vital Signs (Most Recent)  Temp: 97.1 °F (36.2 °C) (06/11/18 1009)  Pulse: 106 (06/11/18 1009)  SpO2: 100 % (06/11/18 1009)  5' 9" (1.753 m)  77.4 kg (170 lb 10.2 oz)     Physical Exam     HENT:   Head: Normocephalic.   Neck: No JVD present. Carotid bruit is not present.   Cardiovascular: Regular rhythm and normal heart sounds.    Smooth VAD hum. DLES is "2"   Pulmonary/Chest: Effort normal and breath sounds normal. No respiratory distress. Lungs are clear  Gi: soft; no tenderness  Musculoskeletal: no edema.   Neurological: He is alert.   Skin: Skin is warm.   Vitals reviewed.    Laboratory  Reviewed  Creatinine is 1.1  H&H: 9/30  INR 2.5  LDH: 161    Diagnostic Results:  CT: chest/abdomen/pelvis:  No acute process identified in the chest abdomen and pelvis.    Mild cardiomegaly with LVAD and AICD in place.      2D echo:    1 - HeartMate III LVAD in place - speed unknown.  The aortic valve appears to open intermittently.     2 - Mild left ventricular enlargement.     3 - Severely depressed left ventricular systolic function (EF 20-25%).     4 - Moderately depressed right ventricular systolic function .     5 - Severe left atrial enlargement.     6 - The estimated PA systolic pressure is 24 mmHg.     7 - Intermediate central venous pressure.   LVEDD 6.1 cm      ASSESSMENT/PLAN:     67 year old male with HM 3 presents for work up for possible listing for heart transplant.    PLAN:    CT scan does not preclude from heart transplant, however the only concern is that it took a lot for the patient to " recover from VAD, would like to see him getting more stronger. But appears patient doing very well, however if medical team thinks that patient is strong enough then would proceed with work up.

## 2018-06-11 NOTE — LETTER
June 11, 2018        Joe Ernst  [address] [place] [address]  Phone: [phone]  Fax: [phone]             [facility] [address] [place] [address]  Phone: [phone]   Patient: Suman Hayden   MR Number: [id number]   YOB: 1950   Date of Visit: 6/11/2018       Dear Dr. Joe Ernst    Thank you for referring Suman Hayden to me for evaluation. Attached you will find relevant portions of my assessment and plan of care.    If you have questions, please do not hesitate to call me. I look forward to following Suman Hayden along with you.    Sincerely,    Mohit Ambrosio MD    Enclosure    If you would like to receive this communication electronically, please contact [email or web address] or [phone] to request [employer] [employer] Link access.    [facility] Link is a tool which provides read-only access to select patient information with whom you have a relationship. Its easy to use and provides real time access to review your patients record including encounter summaries, notes, results, and demographic information.    If you feel you have received this communication in error or would no longer like to receive these types of communications, please e-mail [email or web address]

## 2018-06-11 NOTE — PROGRESS NOTES
"Subjective:   Patient ID:  Suman Hayden is a 67 y.o. male who presents for LVAD followup visit.    Implant Date:7/27/17     HM 3 RPM: 5200  [place] [id number]     INR goal: 2-3   Bridge with Heparin   Antiplatelets: ASA stopped on 12/22/17 after GI bleed and ileal ulcer     TXP SHERRI INTERROGATIONS 6/11/2018   Type -   Flow 4.1   Speed 5200   PI 4.0   Power ([place]ca) 3.6   LSL 4800   Pulsatility Pulse       HPI   Mr. Hayden is a very pleasant 68 y/o black male with stage D HFrEF due to  non ischemic cardiomyopathy underwent Heartmate  3 LVAD @5200  who was admitted in March 2018 for GI bleed (GI Bleed: 8/17, 12/17, 1/18)  resulting in  ex lap with partial small bowel resection on 3/13/2018 with minimal postoperative complications. Comes for his regular VAD visit. Doing well.  VAD speed is at 5200 rpm. No VAD alarms noted on interrogation occasional PI events. BP is 80 (Doppler). DLES is a "2". INR is therapeutic at 2.5. LDH is 161 at baseline.     Review of Systems   Constitution: Negative. Negative for chills, decreased appetite, diaphoresis, fever, weakness, malaise/fatigue, night sweats, weight gain and weight loss.   Eyes: Negative.    Cardiovascular: Negative for chest pain, claudication, cyanosis, dyspnea on exertion, irregular heartbeat, leg swelling, near-syncope, orthopnea, palpitations, paroxysmal nocturnal dyspnea and syncope.   Respiratory: Negative for cough, hemoptysis and shortness of breath.    Endocrine: Negative.    Hematologic/Lymphatic: Negative.    Skin: Negative for color change, dry skin and nail changes.   Musculoskeletal: Negative.    Gastrointestinal: Negative.    Genitourinary: Negative.        Objective:   Blood pressure (!) 86/76, pulse 109, temperature 97.9 °F (36.6 °C), temperature source Oral, height 5' 9" (1.753 m), weight 74.5 kg (164 lb 4.8 oz).body mass index is 24.26 kg/m².    Doppler: 80     Physical Exam   Constitutional: He appears well-developed.   BP (!) 86/76 (BP Location: " "Left arm, Patient Position: Sitting, BP Method: Medium (Automatic))   Pulse 109   Temp 97.9 °F (36.6 °C) (Oral)   Ht 5' 9" (1.753 m)   Wt 74.5 kg (164 lb 4.8 oz)   BMI 24.26 kg/m²      HENT:   Head: Normocephalic.   Neck: No JVD present. Carotid bruit is not present.   Cardiovascular: Regular rhythm and normal heart sounds.    No murmur heard.  Smooth VAD hum. DLES is "2"   Pulmonary/Chest: Effort normal and breath sounds normal. No respiratory distress. He has no wheezes. He has no rales.   Abdominal: Soft. Bowel sounds are normal. He exhibits no distension. There is no tenderness. There is no rebound.   Musculoskeletal: He exhibits no edema.   Neurological: He is alert.   Skin: Skin is warm.   Vitals reviewed.      Lab Results   Component Value Date    WBC 4.70 06/11/2018    HGB 9.3 (L) 06/11/2018    HCT 30.8 (L) 06/11/2018    MCV 79 (L) 06/11/2018     06/11/2018    CO2 23 06/11/2018    CREATININE 1.1 06/11/2018    CALCIUM 10.1 06/11/2018    BILIRUTOT 0.3 01/15/2018    ALBUMIN 3.8 06/11/2018    AST 24 06/11/2018     (H) 06/11/2018    ALT 19 06/11/2018     06/11/2018       Lab Results   Component Value Date    INR 2.5 (H) 06/11/2018    INR 1.9 06/07/2018    INR 1.8 06/05/2018       BNP   Date Value Ref Range Status   06/11/2018 531 (H) 0 - 99 pg/mL Final     Comment:     Values of less than 100 pg/ml are consistent with non-CHF populations.   05/09/2018 403 (H) 0 - 99 pg/mL Final     Comment:     Values of less than 100 pg/ml are consistent with non-CHF populations.   04/11/2018 305 (H) 0 - 99 pg/mL Final     Comment:     Values of less than 100 pg/ml are consistent with non-CHF populations.       LD   Date Value Ref Range Status   06/11/2018 161 110 - 260 U/L Final     Comment:     Results are increased in hemolyzed samples.   05/09/2018 164 110 - 260 U/L Final     Comment:     Results are increased in hemolyzed samples.   04/11/2018 156 110 - 260 U/L Final     Comment:     Results are " "increased in hemolyzed samples.     Assessment:      1. Heart replaced by heart assist device    2. Chronic systolic congestive heart failure    3. Nonischemic cardiomyopathy    4. Chronic atrial fibrillation    5. AICD (automatic cardioverter/defibrillator) present        Plan:   Patient is now NYHA class II. BP is controlled.  INR is therapeutic.  DLES "2" concern for DL infection. Unable to culture it (nothing to culture). Start Doxy and Cipro for 1 week  Monitor if does not improve will refer to ID.   VAD interrogation was performed in clinic  VAD supplies provided.   Any VAD management kits dispensed today medically necessary  Recommend 2 gram sodium restriction and 1500cc fluid restriction.  Encourage physical activity with graded exercise program.  Requested patient to weigh themselves daily, and to notify us if their weight increases by more than 3 lbs in 1 day or 5 lbs in 1 week.       Listed for transplant: DT but currently being evaluated for OHTx    UNOS Patient Status  Functional Status: 80% - Normal activity with effort: some symptoms of disease  Physical Capacity: No Limitations  Working for Income: Unknown    Mohit Ambrosio MD                 "

## 2018-06-11 NOTE — LETTER
June 12, 2018      Angie Torres MD  [address]  [facility] [address]           [facility] Cardi[facility]  [address]  [facility] [address]  Phone: [phone]          Patient: Suman Hayden   MR Number: [id number]   YOB: 1950   Date of Visit: 6/11/2018       Dear Dr. Angie Torres:    Thank you for referring Sumna Hayden to me for evaluation. Attached you will find relevant portions of my assessment and plan of care.    If you have questions, please do not hesitate to call me. I look forward to following Suman Hayden along with you.    Sincerely,    Sunny Downing MD    Enclosure  CC:  No Recipients    If you would like to receive this communication electronically, please contact [email or web address] or [phone] to request more information on [email or web address] Link access.    For providers and/or their staff who would like to refer a patient to [facility], please contact us through our one-stop-shop provider referral line, Roland Das, at [phone].    If you feel you have received this communication in error or would no longer like to receive these types of communications, please e-mail [email or web address]

## 2018-06-12 NOTE — PROGRESS NOTES
Subjective:      Patient ID: Suman Hayden is a 67 y.o. male.    Chief Complaint:Heart Transplant Evaluation      History of Present Illness    Mr. Suman Hayden is a 67 y.o.male who presents today for heart transplant evaluation. Mr. Hayden has a past medical history of NICMM. He had an LVAD placed on 7/27/17. He is currently being evaluated for a heart transplant. Mr. Hayden states small amount drainage around DLES. Patient seen earlier in LVAD clinic and prescription called in for 7 days of antibiotics. Patient denies fever, chills, tenderness around site.             Component      Latest Ref Rng & Units 5/9/2018 3/13/2018 3/8/2018   NIL      See text IU/mL 0.034     TB Antigen      See text IU/mL 0.025     TB Antigen - Nil      See Text IU/mL -0.009     Mitogen - Nil      See text IU/mL 8.470     TB Gold       Negative     Hep B Viral DNA IU/ML      <10 IU/mL   <10   Log HBV IU/mL      <1.00 Log (10) IU/mL   <1.00   Hepatitis B Virus DNA      Not detected   Not detected   Hep. B Surf Ab, Qual        POSITIVE    Hep. B Surf Ab, Quant.      mIU/mL  81    HIV 1/2 Ag/Ab      Negative Negative     Hepatitis C Ab       Negative     RPR      Non-reactive Non-reactive         ROS  Objective:   Physical Exam  Assessment:       1. Heart replaced by heart assist device    2. Need for hepatitis A vaccination    3. Need for 23-polyvalent pneumococcal polysaccharide vaccine          Plan:       1. Pneumococcal 23-valent and Hepatitis A vaccine today. Other vaccinations up to date.   2. Doxcycycline and Cipro for 14 days, new prescription called to pharmacy.   3. Follow up in 4 weeks

## 2018-06-12 NOTE — TELEPHONE ENCOUNTER
Patient's wife called very upset over Home Health being cancelled explaining that he needs it and it is beneficial. Spoke with Home Health RN who took verbal to continue home health at this time.

## 2018-06-14 PROBLEM — Z45.02 ICD (IMPLANTABLE CARDIOVERTER-DEFIBRILLATOR) DISCHARGE: Status: ACTIVE | Noted: 2018-01-01

## 2018-06-14 PROBLEM — T82.198A INAPPROPRIATE SHOCKS FROM ICD (IMPLANTABLE CARDIOVERTER-DEFIBRILLATOR): Status: ACTIVE | Noted: 2018-01-01

## 2018-06-14 NOTE — HPI
Mr. Hayden is a 67 year old man with stage D HFrEF/NICM s/p HM3 @5200, hx of GIB (8/17, 12/17, 1/18, 3/18) s/p ex lap and partial SB resection transferred from  after presenting to ED with ICD shock. He was in his usual state of health, no preceding symptoms, when he felt a shock. Felt back to baseline within several seconds. Called EP clinic and advised to come to ER. At ER, noted to have decreased in Hb from 9.3 two days ago to 7.7. Tn .029, BNP 200s. Denies any melena, hematochezia, hematemesis. Taking all medications as prescribed. Per report, interrogation revealed fast AFL above VT threshold resulting in ICD shock.

## 2018-06-14 NOTE — ED NOTES
Transfer center called - [phone]. Spoke with Baljinder - at this time there are no beds for transfer and possibly not until tomorrow.  Will keep me updated if something changes.  Pt and wife aware of bed availability.   Pt resting comfortably at this time with no complaints.

## 2018-06-14 NOTE — SUBJECTIVE & OBJECTIVE
Past Medical History:   Diagnosis Date    Arthritis     Cardiomyopathy     CHF (congestive heart failure)     Coronary artery disease     Encounter for blood transfusion     Gastric polyp     Hyperlipidemia     Hypertension     Hypotension, iatrogenic     Obesity     S/P implantation of automatic cardioverter/defibrillator (AICD)     Ventricular tachycardia        Past Surgical History:   Procedure Laterality Date    ABDOMINAL SURGERY      CARDIAC CATHETERIZATION      CARDIAC DEFIBRILLATOR PLACEMENT      CARDIAC DEFIBRILLATOR PLACEMENT      COLONOSCOPY      COLONOSCOPY N/A 3/9/2018    Procedure: COLONOSCOPY;  Surgeon: Andres Chatterjee MD;  Location: [address]);  Service: Endoscopy;  Laterality: N/A;    ESOPHAGOGASTRODUODENOSCOPY      LEFT VENTRICULAR ASSIST DEVICE  07/27/2017       Review of patient's allergies indicates:  No Known Allergies    Current Facility-Administered Medications on File Prior to Encounter   Medication    [COMPLETED] amiodarone tablet 400 mg     Current Outpatient Prescriptions on File Prior to Encounter   Medication Sig    amLODIPine (NORVASC) 5 MG tablet Take 1 tablet (5 mg total) by mouth once daily. Take one tablet 5mg by mouth once a day.    benzonatate (TESSALON) 100 MG capsule Take 100 mg by mouth 3 (three) times daily as needed for Cough.    ciprofloxacin HCl (CIPRO) 500 MG tablet Take 1 tablet (500 mg total) by mouth 2 (two) times daily.    doxycycline (VIBRA-TABS) 100 MG tablet Take 1 tablet (100 mg total) by mouth 2 (two) times daily.    dronabinol (MARINOL) 5 MG capsule Take 1 capsule (5 mg total) by mouth 3 (three) times daily before meals.    furosemide (LASIX) 20 MG tablet Take 1 tablet (20 mg total) by mouth 2 (two) times daily.    HYDROcodone-acetaminophen (NORCO) 5-325 mg per tablet Take 1 tablet by mouth every 6 (six) hours as needed for Pain.    lisinopril (PRINIVIL,ZESTRIL) 5 MG tablet Take 1 tablet (5 mg total) by mouth once daily.     magnesium oxide (MAG-OX) 400 mg tablet Take 1 tablet (400 mg total) by mouth 2 (two) times daily.    pantoprazole (PROTONIX) 40 MG tablet Take 1 tablet (40 mg total) by mouth once daily.    polyethylene glycol (GLYCOLAX) 17 gram PwPk Take 17 g by mouth daily as needed (constipation).    potassium chloride (MICRO-K) 10 MEQ CpSR Take 2 capsules (20 mEq total) by mouth once daily.    pravastatin (PRAVACHOL) 20 MG tablet Take 1 tablet (20 mg total) by mouth every evening.    warfarin (COUMADIN) 5 MG tablet Take 5mg orally daily, except 2.5mg on Fridays     Family History     Problem Relation (Age of Onset)    Heart disease Mother, Father        Social History Main Topics    Smoking status: Never Smoker    Smokeless tobacco: Never Used    Alcohol use No    Drug use: No    Sexual activity: Not Currently     Review of Systems   Constitution: Negative.   HENT: Negative.    Eyes: Negative.    Cardiovascular:        Icd shock   Respiratory: Negative.    Endocrine: Negative.    Skin: Negative.    Musculoskeletal: Negative.    Gastrointestinal: Negative.    Genitourinary: Negative.    Neurological: Negative.      Objective:     Vital Signs (Most Recent):  Temp: 98.4 °F (36.9 °C) (06/14/18 0823)  Pulse: 80 (06/14/18 0823)  Resp: 16 (06/14/18 0823)  BP: 108/80 (06/14/18 0827)  SpO2: 97 % (06/14/18 0823) Vital Signs (24h Range):  Temp:  [98.2 °F (36.8 °C)-98.5 °F (36.9 °C)] 98.4 °F (36.9 °C)  Pulse:  [79-86] 80  Resp:  [13-28] 16  SpO2:  [97 %-100 %] 97 %  BP: ()/(0-81) 108/80     Weight: 75 kg (165 lb 5.5 oz)  Body mass index is 24.42 kg/m².    SpO2: 97 %  O2 Device (Oxygen Therapy): room air    Physical Exam   Constitutional: He is oriented to person, place, and time. He appears well-developed and well-nourished.   HENT:   Head: Normocephalic and atraumatic.   Eyes: Conjunctivae and EOM are normal. Pupils are equal, round, and reactive to light.   Neck: Normal range of motion. Neck supple. No JVD present.    Cardiovascular: Exam reveals no gallop and no friction rub.    No murmur heard.  VAD hum audible and smooth     Pulmonary/Chest: Effort normal and breath sounds normal. No respiratory distress. He has no wheezes. He has no rales. He exhibits no tenderness.   Abdominal: Soft. Bowel sounds are normal. He exhibits no distension. There is no tenderness.   Musculoskeletal: Normal range of motion. He exhibits no edema or tenderness.   Neurological: He is alert and oriented to person, place, and time.   Skin: Skin is warm and dry. No erythema. No pallor.       Significant Labs:   EP:   Recent Labs  Lab 06/13/18  1920      K 4.3      CO2 24   GLU 77   BUN 21   CREATININE 1.0   CALCIUM 9.7   PROT 7.0   ALBUMIN 3.8   BILITOT 0.4   ALKPHOS 69   AST 24   ALT 16   ANIONGAP 8   ESTGFRAFRICA >60   EGFRNONAA >60   WBC 5.09   HGB 7.7*   HCT 24.4*      INR 2.2*       Significant Imaging: Echocardiogram:   2D echo with color flow doppler:   Results for orders placed or performed in visit on 05/15/18   2D Echo w/ Color Flow Doppler   Result Value Ref Range    EF 20 (A) 55 - 65    Est. PA Systolic Pressure 24.48     Mitral Valve Mobility NORMAL     Tricuspid Valve Regurgitation TRIVIAL     Tele: no events

## 2018-06-14 NOTE — ASSESSMENT & PLAN NOTE
Procedure: Device Interrogation Including analysis of device parameters  Current Settings: Ventricular Assist Device  Review of device function is stable/unstable stable    TXP LVAD INTERROGATIONS 6/14/2018 6/14/2018 6/11/2018 5/9/2018 4/11/2018 3/28/2018 3/28/2018   Type (No Data) HeartMate3 - - - HeartMate3 HeartMate3   Flow - 3.9 - - - 4.0 4.1   Speed - 5200 - - - 5200 5200   PI - 3.7 - - - 3.4 3.7   Power (Montes De Oca) - 3.6 - - - 3.6 3.6   LSL - - - - - - -   Pulsatility - - Pulse Pulse No Pulse - -     INR goal 2-3, on coumadin, therapeutic, bridge with heparin  Antiplatelets: ASA stopped 12/2017 for GIB/ileal ulcer  GDMT: lisinopril 5  MAP goal 60-80, continue lisinopril, amlodipine 5  Lasix 20 bid  VAD interrogation  DLES 2 last visit with Dr. Ambrosio, started 7 days cipro and doxy on 6/11

## 2018-06-14 NOTE — ED NOTES
Notified Baljinder at [facility] transfer center that Dr. Vega with LVAD wants the pt admitted at [facility] [place] per Sindy LVAD coordinator.

## 2018-06-14 NOTE — PROGRESS NOTES
Admit Note     Met with patient and spouse to assess needs. Patient is a 67 y.o.  male, admitted for anemia and ICD shock.  Pt has a LVAD implanted on 7/2017.  Pt's spouse does the dressing changes.     Patient transferred  from [facility] on 6/14/2018 .  At this time, patient presents as alert and oriented x 4, pleasant and good eye contact.  At this time, patients caregiver presents as alert and oriented x 4, pleasant and communicative.    Household/Family Systems     Patient and spouse are residing with their daughter and son in law at  [address]  [place] [address]    Pt's spouse reports their own home is under repairs and until complete the pt and his spouse will stay with their daughter.    Pt's home address   [address] [address].      Support system includes spouse and extended family.    Patient does not have dependents that are need of being cared for.     Patients primary caregiver is Kia Hayden, patients spouse, phone number [phone].    Additional emergency contacts:  Rayna (daughter) [phone]  Malcolm (son in law) [phone]  Pt's spouse reports her son in law is the  in [place].   Boni Hayden (son) [phone]  Artie Catracho (nephew) [phone]    During admission, patient's caregiver plans to stay in patient's room.  Confirmed patient and patients caregivers do have access to reliable transportation.    Cognitive Status/Learning     Patient reports reading ability as 12th grade and states patient does not have difficulty with reading, writing, seeing, hearing, comprehension, learning and memory.  Patient reports patient learns best by visual .     Needed: No.   Highest education level: High School (9-12) or GED    Vocation/Disability   .  Working for Income: No  If no, reason not working: Patient Choice - Retired  Patient is retired in 2000 form the [employer].  Pt was a heavy equipment  .  Pt's spouse reports she worked at [employer] for many years and was working as a PCA until pt became ill.   Pt's spouse reports pt receives $912.00 a eloisa.  Pt's spouse reports they would like to apply for Medicaid.    Pt's spouse reports she may start to receive social security in October 2018.      Adherence     Patient reports a high level of adherence to patients health care regimen. Pt reports he is on a regular diet.  Adherence counseling and education provided. Patient verbalizes understanding.    Substance Use    Patient reports the following substance usage.    Tobacco: none, patient denies any use.  Alcohol: none, patient denies any use.  Illicit Drugs/Non-prescribed Medications: none, patient denies any use.  Patient states clear understanding of the potential impact of substance use.  Substance abstinence/cessation counseling, education and resources provided and reviewed.     Services Utilizing/ADLS    Infusion Service: Prior to admission, patient utilizing? no Pt has used [place] in the past  Home Health: Prior to admission, patient utilizing? yes [facility] once a week for lab work.  [phone], fax [phone]. Pt's spouse reports she wants HH with [facility] when discharged.  Pt's spouse reports she does not want the pt to go to a out pt lab.  Pt's spouse feels HH is more timely than then out Pt lab in her area.   DME: Prior to admission, yes walker, wheelchair, and bedside commode.  Pt does not use the commode.  Pulmonary/Cardiac Rehab: Prior to admission, no  Dialysis:  Prior to admission, no  Transplant Specialty Pharmacy:  Prior to admission, no.    Prior to admission, patient reports patient was independent with ADLS and was not driving. Pt's spouse and other family members drive.   Patient reports patient is not able to care for self at this time due to compromised medical condition (as documented in medical record) and physical weakness..  Patient indicates a willingness to care for self once  medically cleared to do so.    Insurance/Medications    Insured by   Payor/Plan Subscr  Sex Relation Sub. Ins. ID Effective Group Num   1. MEDICARE - ME* MIRTA LOPEZ 1950 Male  [id number] 6/1/15                                    [address]   2. BLUE CROSS BL* MIRTA LOPEZ 1950 Male  [id number] 1/1/10 [id number]                                   [address] [address]      Primary Insurance (for UNOS reporting): Public Insurance - Medicare FFS (Fee For Service)  Secondary Insurance (for UNOS reporting): Private Insurance    Patient reports patient is able to obtain and afford medications at this time and at time of discharge.    Living Will/Healthcare Power of     Patient states patient does not have a LW and/or HCPA.   provided education regarding LW and HCPA and the completion of forms.    Coping/Mental Health    Patient is coping adequately with the aid of  family members, friends and kayli.  Patient denies mental health difficulties.  Pt and spouse report no issues with anxiety or depression.  Pt's spouse reports finances are tight due to a number of reasons.  Pt's spouse requested a meal card and worker provided same.  General support provided.     Discharge Planning    At time of discharge, patient plans to return to patient's home under the care of spouse.  Patients spouse will transport patient.  Per rounds today, expected discharge date has not been medically determined at this time. Patient and patients caregiver  verbalize understanding and are involved in treatment planning and discharge process.    Additional Concerns    Patient's caretaker denies additional needs and/or concerns at this time. Patient is being followed for needs, education, resources, information, emotional support, supportive counseling, and for supportive and skilled discharge plan of care.  providing ongoing psychosocial support, education, resources and d/c planning as needed.   SW remains available. Patient's caregiver verbalizes understanding and agreement with information reviewed,  availability and how to access available resources as needed. Patient denies additional needs and/or concerns at this time. Patient verbalizes understanding and agreement with information reviewed, social work availability, and how to access available resources as needed.

## 2018-06-14 NOTE — ED PROVIDER NOTES
SCRIBE #1 NOTE: I, Irma [place], am scribing for, and in the presence of, Nettie Silva Do, MD. I have scribed the entire note.      History      Chief Complaint   Patient presents with    Defibrillator malfunction     States he was shocked this afternoon after excercising.  "[employer] called and said for him to go to the ER.       Review of patient's allergies indicates:  No Known Allergies     HPI   HPI    6/13/2018, 8:06 PM   History obtained from the patient      History of Present Illness: Suman Hayden is a 67 y.o. male patient with PMHx of HTN, CHF, CAD and Cardiomyopathy who presents to the Emergency Department for a defibrillator malfunction which onset gradually this morning. Pt states he had just finished walking on the treadmill at 1.0 for 1 mile.  He felt fine and had no complications.  He the went to sit down and rest and his defibillator fired.  He went into remote monitoring. Pt states he received a phone call from [employer] around 4:30pm who instructed him to come to ED to get checked out. Pt reports he had no symptoms prior to defibrillation.  No mitigating or exacerbating factors reported. No associated sxs reported. Patient denies any fever, chills, CP, SOB,diaphoresis, palpitations, extremity weakness/numbness, dizziness, n/v/d and all other sxs at this time. No prior Tx reported.     Pt states he went to the Cardiologist in [facility] on Monday who requested he walk 3 miles non-stop. No further complaints or concerns at this time.         Arrival mode: Personal vehicle    PCP: Joe Ernst MD       Past Medical History:  Past Medical History:   Diagnosis Date    Arthritis     Cardiomyopathy     CHF (congestive heart failure)     Coronary artery disease     Encounter for blood transfusion     Gastric polyp     Hyperlipidemia     Hypertension     Hypotension, iatrogenic     Obesity     S/P implantation of automatic cardioverter/defibrillator (AICD)     Ventricular tachycardia         Past Surgical History:  Past Surgical History:   Procedure Laterality Date    ABDOMINAL SURGERY      CARDIAC CATHETERIZATION      CARDIAC DEFIBRILLATOR PLACEMENT      CARDIAC DEFIBRILLATOR PLACEMENT      COLONOSCOPY      COLONOSCOPY N/A 3/9/2018    Procedure: COLONOSCOPY;  Surgeon: Andres Chatterjee MD;  Location: [facility] ([address] [place] [address]);  Service: Endoscopy;  Laterality: N/A;    ESOPHAGOGASTRODUODENOSCOPY      LEFT VENTRICULAR ASSIST DEVICE  07/27/2017         Family History:  Family History   Problem Relation Age of Onset    Heart disease Mother     Heart disease Father        Social History:  Social History     Social History Main Topics    Smoking status: Never Smoker    Smokeless tobacco: Never Used    Alcohol use No    Drug use: No    Sexual activity: Not Currently       ROS   Review of Systems   Constitutional: Negative for chills, diaphoresis, fatigue and fever.   HENT: Negative for congestion and sore throat.    Respiratory: Negative for shortness of breath.    Cardiovascular: Negative for chest pain, palpitations and leg swelling.        (+) Defib Malfunction    Gastrointestinal: Negative for abdominal pain, diarrhea, nausea and vomiting.   Genitourinary: Negative for dysuria.   Musculoskeletal: Negative for arthralgias, back pain, myalgias and neck pain.   Skin: Negative for rash.   Neurological: Negative for dizziness, weakness, light-headedness, numbness and headaches.   Hematological: Does not bruise/bleed easily.   All other systems reviewed and are negative.      Physical Exam      Initial Vitals [06/13/18 1902]   BP Pulse Resp Temp SpO2   108/74 79 20 98.5 °F (36.9 °C) 99 %      MAP       --          Physical Exam  Nursing Notes and Vital Signs Reviewed.  Constitutional: Patient is in no acute distress. Well-developed and well-nourished.  Head: Atraumatic. Normocephalic.  Eyes: PERRL. EOM intact. Conjunctivae are not pale. No scleral icterus.  ENT: Mucous membranes are moist.  "Oropharynx is clear and symmetric.    Neck: Supple. Full ROM. No lymphadenopathy.  Cardiovascular: Regular rate. Regular rhythm. Murmur noticed in chest. No rubs, or gallops. Distal pulses are 2+ and symmetric.  Pulmonary/Chest: No respiratory distress. Clear to auscultation bilaterally. No wheezing or rales. LVAD noticed to chest.   Abdominal: Soft and non-distended.  There is no tenderness.  No rebound, guarding, or rigidity. Good bowel sounds.  Musculoskeletal: Moves all extremities. No obvious deformities. No edema. No calf tenderness.  Skin: Warm and dry.  Neurological:  Alert, awake, and appropriate.  Normal speech.  No acute focal neurological deficits are appreciated.  Psychiatric: Normal affect. Good eye contact. Appropriate in content.    ED Course    Procedures  ED Vital Signs:  Vitals:    06/13/18 1902 06/13/18 1916 06/13/18 1920 06/13/18 1932   BP: 108/74 100/75  (!) 95/53   Pulse: 79 80 80 79   Resp: 20 (!) 28  19   Temp: 98.5 °F (36.9 °C)      TempSrc: Oral      SpO2: 99% 100%  99%   Weight: 78.4 kg (172 lb 13.5 oz)      Height: 5' 9" (1.753 m)       06/13/18 2001 06/13/18 2031 06/13/18 2101 06/13/18 2116   BP: (!) 109/58 103/68 (!) 98/57 101/66   Pulse: 79 80 79 79   Resp: 13 (!) 21 16 19   Temp:       TempSrc:       SpO2: 100% 100% 100% 100%   Weight:       Height:        06/13/18 2131 06/13/18 2146 06/13/18 2203 06/13/18 2217   BP: (!) 113/52 (!) 107/50 91/63 116/71   Pulse: 80 80 85 79   Resp: (!) 25 17 20 17   Temp:       TempSrc:       SpO2: 100% 100% 100% 100%   Weight:       Height:           Abnormal Lab Results:  Labs Reviewed   CBC W/ AUTO DIFFERENTIAL - Abnormal; Notable for the following:        Result Value    RBC 3.27 (*)     Hemoglobin 7.7 (*)     Hematocrit 24.4 (*)     MCV 75@1rep (*)     MCH 23.5 (*)     MCHC 31.6 (*)     RDW 16.7 (*)     Gran% 35.4 (*)     Eosinophil% 9.2 (*)     All other components within normal limits   TROPONIN I - Abnormal; Notable for the following:     " Troponin I 0.029 (*)     All other components within normal limits   PROTIME-INR - Abnormal; Notable for the following:     Prothrombin Time 23.2 (*)     INR 2.2 (*)     All other components within normal limits   B-TYPE NATRIURETIC PEPTIDE - Abnormal; Notable for the following:      (*)     All other components within normal limits   APTT - Abnormal; Notable for the following:     aPTT 36.6 (*)     All other components within normal limits   COMPREHENSIVE METABOLIC PANEL   B-TYPE NATRIURETIC PEPTIDE   LIPASE        All Lab Results:  Results for orders placed or performed during the hospital encounter of 06/13/18   CBC auto differential   Result Value Ref Range    WBC 5.09 3.90 - 12.70 K/uL    RBC 3.27 (L) 4.60 - 6.20 M/uL    Hemoglobin 7.7 (L) 14.0 - 18.0 g/dL    Hematocrit 24.4 (L) 40.0 - 54.0 %    MCV 75@1rep (L) 82 - 98 fL    MCH 23.5 (L) 27.0 - 31.0 pg    MCHC 31.6 (L) 32.0 - 36.0 g/dL    RDW 16.7 (H) 11.5 - 14.5 %    Platelets 159 150 - 350 K/uL    MPV 10.7 9.2 - 12.9 fL    Gran # (ANC) 1.8 1.8 - 7.7 K/uL    Lymph # 2.2 1.0 - 4.8 K/uL    Mono # 0.6 0.3 - 1.0 K/uL    Eos # 0.5 0.0 - 0.5 K/uL    Baso # 0.03 0.00 - 0.20 K/uL    Gran% 35.4 (L) 38.0 - 73.0 %    Lymph% 43.8 18.0 - 48.0 %    Mono% 11.0 4.0 - 15.0 %    Eosinophil% 9.2 (H) 0.0 - 8.0 %    Basophil% 0.6 0.0 - 1.9 %    Differential Method Automated    Comprehensive metabolic panel   Result Value Ref Range    Sodium 138 136 - 145 mmol/L    Potassium 4.3 3.5 - 5.1 mmol/L    Chloride 106 95 - 110 mmol/L    CO2 24 23 - 29 mmol/L    Glucose 77 70 - 110 mg/dL    BUN, Bld 21 8 - 23 mg/dL    Creatinine 1.0 0.5 - 1.4 mg/dL    Calcium 9.7 8.7 - 10.5 mg/dL    Total Protein 7.0 6.0 - 8.4 g/dL    Albumin 3.8 3.5 - 5.2 g/dL    Total Bilirubin 0.4 0.1 - 1.0 mg/dL    Alkaline Phosphatase 69 55 - 135 U/L    AST 24 10 - 40 U/L    ALT 16 10 - 44 U/L    Anion Gap 8 8 - 16 mmol/L    eGFR if African American >60 >60 mL/min/1.73 m^2    eGFR if non  >60  >60 mL/min/1.73 m^2   Troponin I #1   Result Value Ref Range    Troponin I 0.029 (H) 0.000 - 0.026 ng/mL   Protime-INR   Result Value Ref Range    Prothrombin Time 23.2 (H) 9.0 - 12.5 sec    INR 2.2 (H) 0.8 - 1.2   Brain natriuretic peptide   Result Value Ref Range     (H) 0 - 99 pg/mL   APTT   Result Value Ref Range    aPTT 36.6 (H) 21.0 - 32.0 sec   Lipase   Result Value Ref Range    Lipase 34 4 - 60 U/L         Imaging Results:  Imaging Results          X-Ray Chest PA And Lateral (Final result)  Result time 06/13/18 20:44:00    Final result by Avery Orellana MD (06/13/18 20:44:00)                 Impression:      Stable chest x-ray as above.      Electronically signed by: Avery Orellana MD  Date:    06/13/2018  Time:    20:44             Narrative:    EXAMINATION:  XR CHEST PA AND LATERAL    CLINICAL HISTORY  Heart failure., Chest Pain;    COMPARISON:  05/20/2018.    FINDINGS:  Postoperative changes.  Left ventricular assist device is noted.  Left AICD.    Mild cardiomegaly.  Clear lungs.  Mild thoracic spondylosis.                               The EKG was ordered, reviewed, and independently interpreted by the ED provider.  Interpretation time: 22:06  Rate: 80 BPM  Rhythm: Wide QRS rhythm with ocassional ventricular-paced complexes and with occasional premature ventricular complexes.  Interpretation: Right superior axis deviation. Cannot rule out anteroseptal infarct. No STEMI.             The Emergency Provider reviewed the vital signs and test results, which are outlined above.    ED Discussion     9:56 PM: Consult with Dr. Jorje Duff (Cardiolgy) at [facility] concerning pt. The LVAD team wants the pt at [facility]. Dr. Duff expresses understanding and will accept transfer.   Accepting Facility: [facility]   Accepting Physician: Dr. Jorje Duff     10:10 PM: Dr. Zavala discussed the pt's case with Dr. Duff (Cardiology) who recommends giving the pt 400 mg of  Amiodarone. [facility]  Evaluated pt defibrillator and pt was defibrillated after he had a flutter with rate over 200.  He was noted to have intermittent a flutter on the interrogation of his defibrillator.      10:15 PM: Re-evaluated pt.  I have discussed test results, shared treatment plan, and the need for transfer with patient and family at bedside. All historical, clinical, radiographic, and laboratory findings were reviewed with the patient/family in detail. [facility] Patient understands that there could be unforeseen motor vehicle accidents or loss of vital signs that could result in potential death or permanent disability. Pt and family express understanding at this time and agree with all information. All questions answered. Pt and family have no further questions or concerns at this time. Pt is ready for transfer.         ED Medication(s):  Medications   amiodarone tablet 400 mg (400 mg Oral Given 6/13/18 2307)       New Prescriptions    No medications on file             Medical Decision Making              Scribe Attestation:   Scribe #1: I performed the above scribed service and the documentation accurately describes the services I performed. I attest to the accuracy of the note.    Attending:   Physician Attestation Statement for Scribe #1: I, Nettie Silva Do, MD, personally performed the services described in this documentation, as scribed by Irma Walden, in my presence, and it is both accurate and complete.          Clinical Impression       ICD-10-CM ICD-9-CM   1. Anemia, unspecified type D64.9 285.9   2. Chest pain R07.9 786.50   3. Atrial flutter, paroxysmal I48.92 427.32       Disposition:   Disposition: Transferred  Condition: Stable         Nettie Silva Do, MD  06/13/18 2356       Nettie Silva Do, MD  06/14/18 0000

## 2018-06-14 NOTE — HPI
67 BM with chronic systolic heart failure secondary to non ischemic cardiomyopathy underwent Heartmate 3 LVAD implanted as DT therapy 7/27/17. He underwent ICD revision on 11/20/17 (DC ICD placed with active RA/LV leads; RV lead capped during revision) that was complicated by pocket hematoma. In January 2018 he developed staph epid bacteremia.  Post-op course further complicated by GI bleeding due to ilieal bleed that was APC'ed using double balloon enteroscopy by Dr. Chatterjee Dec 2017. His aspirin was stopped 12/22/17 and PPI increased to BID . He was re-admitted 1/25/17 where he had a double balloon enteroscopy without active bleed , but with ulceration, and subsequent retrograde double balloon enteroscopy on 1/26/18 which was unrevealing.  ICD last evaluated 2/23/18 in office >> A flutter with good rate control, a few runs of VT -- one that lasted hours -- ASxc -- ICD ignored them -- in monitoring zone.    Follows with:  DR Winchester  Devices: DC-ICD  Interrogations:     Last shock 15-May-2018 12:39   VF 1 35J A146/V207  Arrhythmia History: atrial flutter in monitor zone  Prior AAD use: amiodarone  Anticoagulation: warfarin

## 2018-06-14 NOTE — ED NOTES
Pt resting quietly on stretcher with no complaint of pain.  Awaiting acceptance and room assignment from [facility] for transfer.  Wife at bedside. Cont on monitor.  No signs of distress noted.  SR up x 1, call light in reach.

## 2018-06-14 NOTE — ED NOTES
Sindy GASTELUM LVAD coordinator notified of pt being in the ED and that his Defibrillator went of once this morning after the pt exercised.

## 2018-06-14 NOTE — ASSESSMENT & PLAN NOTE
-Hx of multiple GIB  -No overt bleeding  -Hb 7.7 from 9.3 two days ago  -FOBT  -Repeat CBC now  -Protonix 40 IV bid for now (home 40 po daily)  -Will consider GI consult

## 2018-06-14 NOTE — NURSING
Met with pt and wife at bedside to discuss completion of O[facility] eval.   Per SW note, pt was to address some financial issues prior to transplant.    Pt and wife report being unaware of anything they need to do.  Requested that SW team follow up with pt this admit regarding clearance for transplant as pt was deemed high risk.     Pt will be presented to selection committee once SW has had a chance to address these issues with pt.

## 2018-06-14 NOTE — CONSULTS
[facility]  Cardiac Electrophysiology  Consult Note    Admission Date: 6/14/2018  Code Status: Full Code   Attending Provider: Deejay Duff Jr.,*  Consulting Provider: Karl Sierra MD  Principal Problem:<principal problem not specified>    Inpatient consult to Electrophysiology  Consult performed by: KARL SIERRA  Consult ordered by: MAHSA SERRANO        Subjective:     Chief Complaint:  ICD shock     HPI:   67 BM with chronic systolic heart failure secondary to non ischemic cardiomyopathy underwent Heartmate 3 LVAD implanted as DT therapy 7/27/17. He underwent ICD revision on 11/20/17 (DC ICD placed with active RA/LV leads; RV lead capped during revision) that was complicated by pocket hematoma. In January 2018 he developed staph epid bacteremia.  Post-op course further complicated by GI bleeding due to ilieal bleed that was APC'ed using double balloon enteroscopy by Dr. Chatterjee Dec 2017. His aspirin was stopped 12/22/17 and PPI increased to BID . He was re-admitted 1/25/17 where he had a double balloon enteroscopy without active bleed , but with ulceration, and subsequent retrograde double balloon enteroscopy on 1/26/18 which was unrevealing.  ICD last evaluated 2/23/18 in office >> A flutter with good rate control, a few runs of VT -- one that lasted hours -- ASxc -- ICD ignored them -- in monitoring zone.    Follows with:  DR Winchester  Devices: DC-ICD  Interrogations:     Last shock 15-May-2018 12:39   VF 1 35J A146/V207  Arrhythmia History: atrial flutter in monitor zone  Prior AAD use: amiodarone  Anticoagulation: warfarin        Past Medical History:   Diagnosis Date    Arthritis     Cardiomyopathy     CHF (congestive heart failure)     Coronary artery disease     Encounter for blood transfusion     Gastric polyp     Hyperlipidemia     Hypertension     Hypotension, iatrogenic     Obesity     S/P implantation of automatic cardioverter/defibrillator (AICD)      Ventricular tachycardia        Past Surgical History:   Procedure Laterality Date    ABDOMINAL SURGERY      CARDIAC CATHETERIZATION      CARDIAC DEFIBRILLATOR PLACEMENT      CARDIAC DEFIBRILLATOR PLACEMENT      COLONOSCOPY      COLONOSCOPY N/A 3/9/2018    Procedure: COLONOSCOPY;  Surgeon: Andres Chatterjee MD;  Location: [facility] ([address] [place] [address]);  Service: Endoscopy;  Laterality: N/A;    ESOPHAGOGASTRODUODENOSCOPY      LEFT VENTRICULAR ASSIST DEVICE  07/27/2017       Review of patient's allergies indicates:  No Known Allergies    Current Facility-Administered Medications on File Prior to Encounter   Medication    [COMPLETED] amiodarone tablet 400 mg     Current Outpatient Prescriptions on File Prior to Encounter   Medication Sig    amLODIPine (NORVASC) 5 MG tablet Take 1 tablet (5 mg total) by mouth once daily. Take one tablet 5mg by mouth once a day.    benzonatate (TESSALON) 100 MG capsule Take 100 mg by mouth 3 (three) times daily as needed for Cough.    ciprofloxacin HCl (CIPRO) 500 MG tablet Take 1 tablet (500 mg total) by mouth 2 (two) times daily.    doxycycline (VIBRA-TABS) 100 MG tablet Take 1 tablet (100 mg total) by mouth 2 (two) times daily.    dronabinol (MARINOL) 5 MG capsule Take 1 capsule (5 mg total) by mouth 3 (three) times daily before meals.    furosemide (LASIX) 20 MG tablet Take 1 tablet (20 mg total) by mouth 2 (two) times daily.    HYDROcodone-acetaminophen (NORCO) 5-325 mg per tablet Take 1 tablet by mouth every 6 (six) hours as needed for Pain.    lisinopril (PRINIVIL,ZESTRIL) 5 MG tablet Take 1 tablet (5 mg total) by mouth once daily.    magnesium oxide (MAG-OX) 400 mg tablet Take 1 tablet (400 mg total) by mouth 2 (two) times daily.    pantoprazole (PROTONIX) 40 MG tablet Take 1 tablet (40 mg total) by mouth once daily.    polyethylene glycol (GLYCOLAX) 17 gram PwPk Take 17 g by mouth daily as needed (constipation).    potassium chloride (MICRO-K) 10 MEQ CpSR Take 2  capsules (20 mEq total) by mouth once daily.    pravastatin (PRAVACHOL) 20 MG tablet Take 1 tablet (20 mg total) by mouth every evening.    warfarin (COUMADIN) 5 MG tablet Take 5mg orally daily, except 2.5mg on Fridays     Family History     Problem Relation (Age of Onset)    Heart disease Mother, Father        Social History Main Topics    Smoking status: Never Smoker    Smokeless tobacco: Never Used    Alcohol use No    Drug use: No    Sexual activity: Not Currently     Review of Systems   Constitution: Negative.   HENT: Negative.    Eyes: Negative.    Cardiovascular:        Icd shock   Respiratory: Negative.    Endocrine: Negative.    Skin: Negative.    Musculoskeletal: Negative.    Gastrointestinal: Negative.    Genitourinary: Negative.    Neurological: Negative.      Objective:     Vital Signs (Most Recent):  Temp: 98.4 °F (36.9 °C) (06/14/18 0823)  Pulse: 80 (06/14/18 0823)  Resp: 16 (06/14/18 0823)  BP: 108/80 (06/14/18 0827)  SpO2: 97 % (06/14/18 0823) Vital Signs (24h Range):  Temp:  [98.2 °F (36.8 °C)-98.5 °F (36.9 °C)] 98.4 °F (36.9 °C)  Pulse:  [79-86] 80  Resp:  [13-28] 16  SpO2:  [97 %-100 %] 97 %  BP: ()/(0-81) 108/80     Weight: 75 kg (165 lb 5.5 oz)  Body mass index is 24.42 kg/m².    SpO2: 97 %  O2 Device (Oxygen Therapy): room air    Physical Exam   Constitutional: He is oriented to person, place, and time. He appears well-developed and well-nourished.   HENT:   Head: Normocephalic and atraumatic.   Eyes: Conjunctivae and EOM are normal. Pupils are equal, round, and reactive to light.   Neck: Normal range of motion. Neck supple. No JVD present.   Cardiovascular: Exam reveals no gallop and no friction rub.    No murmur heard.  VAD hum audible and smooth     Pulmonary/Chest: Effort normal and breath sounds normal. No respiratory distress. He has no wheezes. He has no rales. He exhibits no tenderness.   Abdominal: Soft. Bowel sounds are normal. He exhibits no distension. There is no  tenderness.   Musculoskeletal: Normal range of motion. He exhibits no edema or tenderness.   Neurological: He is alert and oriented to person, place, and time.   Skin: Skin is warm and dry. No erythema. No pallor.       Significant Labs:   EP:   Recent Labs  Lab 06/13/18  1920      K 4.3      CO2 24   GLU 77   BUN 21   CREATININE 1.0   CALCIUM 9.7   PROT 7.0   ALBUMIN 3.8   BILITOT 0.4   ALKPHOS 69   AST 24   ALT 16   ANIONGAP 8   ESTGFRAFRICA >60   EGFRNONAA >60   WBC 5.09   HGB 7.7*   HCT 24.4*      INR 2.2*       Significant Imaging: Echocardiogram:   2D echo with color flow doppler:   Results for orders placed or performed in visit on 05/15/18   2D Echo w/ Color Flow Doppler   Result Value Ref Range    EF 20 (A) 55 - 65    Est. PA Systolic Pressure 24.48     Mitral Valve Mobility NORMAL     Tricuspid Valve Regurgitation TRIVIAL     Tele: no events    Assessment and Plan:     Inappropriate shocks from ICD (implantable cardioverter-defibrillator)    Suspect that this is aflutter with aberrancy after recent activity (walking on treadmill)  ICD interrogation pending, may need to adjust settings  No further events  No AAD at this time            Thank you for your consult. I will follow-up with patient. Please contact us if you have any additional questions.    Casey Newman MD  Cardiac Electrophysiology  [facility]

## 2018-06-14 NOTE — NURSING
LVAD dressing change done. Sterile technique followed. Soap and sterile saline used. DLES 2, scant brown drainage. No odor. Pt tolerated procedure w/o complications. Dressed with gauze and tape. Secured with delgado anchor.

## 2018-06-14 NOTE — H&P
[facility]  Heart Transplant  H&P    Patient Name: Suman Hayden  MRN: [id number]  Admission Date: 6/14/2018  Attending Physician: Deejay Duff Jr.,*  Primary Care Provider: Joe Ernst MD  Principal Problem:<principal problem not specified>    Subjective:     History of Present Illness:  Mr. Hayden is a 67 year old man with stage D HFrEF/NICM s/p HM3 @5200, hx of GIB (8/17, 12/17, 1/18, 3/18) s/p ex lap and partial SB resection transferred from  after presenting to ED with ICD shock. He was in his usual state of health, no preceding symptoms, when he felt a shock. Felt back to baseline within several seconds. Called EP clinic and advised to come to ER. At ER, noted to have decreased in Hb from 9.3 two days ago to 7.7. Tn .029, BNP 200s. Denies any melena, hematochezia, hematemesis. Taking all medications as prescribed. Per report, interrogation revealed fast AFL above VT threshold resulting in ICD shock.    Past Medical History:   Diagnosis Date    Arthritis     Cardiomyopathy     CHF (congestive heart failure)     Coronary artery disease     Encounter for blood transfusion     Gastric polyp     Hyperlipidemia     Hypertension     Hypotension, iatrogenic     Obesity     S/P implantation of automatic cardioverter/defibrillator (AICD)     Ventricular tachycardia        Past Surgical History:   Procedure Laterality Date    ABDOMINAL SURGERY      CARDIAC CATHETERIZATION      CARDIAC DEFIBRILLATOR PLACEMENT      CARDIAC DEFIBRILLATOR PLACEMENT      COLONOSCOPY      COLONOSCOPY N/A 3/9/2018    Procedure: COLONOSCOPY;  Surgeon: Andres Chatterjee MD;  Location: [address];  Service: Endoscopy;  Laterality: N/A;    ESOPHAGOGASTRODUODENOSCOPY      LEFT VENTRICULAR ASSIST DEVICE  07/27/2017       Review of patient's allergies indicates:  No Known Allergies    Current Facility-Administered Medications   Medication    amLODIPine tablet 5 mg    benzonatate capsule  100 mg    ciprofloxacin HCl tablet 500 mg    doxycycline tablet 100 mg    dronabinol capsule 5 mg    furosemide tablet 20 mg    HYDROcodone-acetaminophen 5-325 mg per tablet 1 tablet    lisinopril tablet 5 mg    magnesium oxide tablet 400 mg    pantoprazole EC tablet 40 mg    potassium chloride CR capsule 20 mEq    pravastatin tablet 20 mg    warfarin (COUMADIN) tablet 5 mg     Family History     Problem Relation (Age of Onset)    Heart disease Mother, Father        Social History Main Topics    Smoking status: Never Smoker    Smokeless tobacco: Never Used    Alcohol use No    Drug use: No    Sexual activity: Not Currently     Review of Systems   All other systems reviewed and are negative.    Objective:     Vital Signs (Most Recent):  Temp: 98.2 °F (36.8 °C) (06/14/18 0615)  Pulse: 79 (06/14/18 0800)  Resp: 16 (06/14/18 0615)  BP: (!) 110/0 (06/14/18 0627)  SpO2: 98 % (06/14/18 0615) Vital Signs (24h Range):  Temp:  [98.2 °F (36.8 °C)-98.5 °F (36.9 °C)] 98.2 °F (36.8 °C)  Pulse:  [79-86] 79  Resp:  [13-28] 16  SpO2:  [98 %-100 %] 98 %  BP: ()/(0-81) 110/0     No data found.    There is no height or weight on file to calculate BMI.    No intake or output data in the 24 hours ending 06/14/18 0823    Physical Exam   Constitutional: He is oriented to person, place, and time. He appears well-nourished. No distress.   Neck: Neck supple. No JVD present.   Cardiovascular:   vad hum   Pulmonary/Chest: Effort normal and breath sounds normal. No respiratory distress. He has no wheezes. He has no rales.   Abdominal: Soft. Bowel sounds are normal. He exhibits no distension. There is no tenderness.   Musculoskeletal: He exhibits no edema.   Neurological: He is alert and oriented to person, place, and time. No cranial nerve deficit.   Skin: Skin is warm. Capillary refill takes less than 2 seconds. No erythema.   Psychiatric: He has a normal mood and affect.       Significant Labs:  CBC:    Recent Labs  Lab  06/11/18  0809 06/13/18 1920   WBC 4.70 5.09   RBC 3.90* 3.27*   HGB 9.3* 7.7*   HCT 30.8* 24.4*    159   MCV 79* 75@1rep*   MCH 23.8* 23.5*   MCHC 30.2* 31.6*     BNP:    Recent Labs  Lab 06/11/18  0809 06/13/18 1920   * 242*     CMP:    Recent Labs  Lab 06/11/18  0809 06/13/18 1920   * 77   CALCIUM 10.1 9.7   ALBUMIN 3.8 3.8   PROT 7.6 7.0    138   K 4.5 4.3   CO2 23 24    106   BUN 24* 21   CREATININE 1.1 1.0   ALKPHOS 76 69   ALT 19 16   AST 24 24   BILITOT 0.5 0.4      Coagulation:     Recent Labs  Lab 06/11/18  0809 06/13/18 1920   INR 2.5* 2.2*   APTT  --  36.6*     LDH:  No results for input(s): LDH in the last 72 hours.  Microbiology:  Microbiology Results (last 7 days)     ** No results found for the last 168 hours. **          I have reviewed all pertinent labs within the past 24 hours.      Assessment/Plan:     67M with HMIII LVAD presenting after ICD shock, also found to have decreased Hgb with hx of multiple GIB/PUD.     Anemia    -Hx of multiple GIB  -No overt bleeding  -Hb 7.7 from 9.3 two days ago  -FOBT  -Repeat CBC now  -Protonix 40 IV bid for now (home 40 po daily)  -Will consider GI consult        ICD (implantable cardioverter-defibrillator) discharge    -Medtronic ICD in place, last interrogation 5/23  -Recent prolonged MMVT (-300) 5/15, ATP unsuccessful, converted w/ 36J shock  -Last interrogation, added VT zone @188 (2 ATP, Burst x3, no shock), no AMS noted at that time  -ICD interrogation  -Per report, fast AF/AFL leading to ICD shock, await confirmation  -EP consult, follows with Dr. Jacque Rodriguez          LVAD (left ventricular assist device) present    Procedure: Device Interrogation Including analysis of device parameters  Current Settings: Ventricular Assist Device  Review of device function is stable/unstable stable    TXP LVAD INTERROGATIONS 6/14/2018 6/14/2018 6/11/2018 5/9/2018 4/11/2018 3/28/2018 3/28/2018   Type (No Data) HeartMate3 - - -  HeartMate3 HeartMate3   Flow - 3.9 - - - 4.0 4.1   Speed - 5200 - - - 5200 5200   PI - 3.7 - - - 3.4 3.7   Power ([place]) - 3.6 - - - 3.6 3.6   LSL - - - - - - -   Pulsatility - - Pulse Pulse No Pulse - -     INR goal 2-3, on coumadin, therapeutic, bridge with heparin  Antiplatelets: ASA stopped 12/2017 for GIB/ileal ulcer  GDMT: lisinopril 5  MAP goal 60-80, continue lisinopril, amlodipine 5  Lasix 20 bid  VAD interrogation  DLES 2 last visit with Dr. Ambrosio, started 7 days cipro and doxy on 6/11        Chronic systolic congestive heart failure    Well compensated, see LVAD            Charbel Chavira MD  Heart Transplant  [facility]

## 2018-06-14 NOTE — SUBJECTIVE & OBJECTIVE
Past Medical History:   Diagnosis Date    Arthritis     Cardiomyopathy     CHF (congestive heart failure)     Coronary artery disease     Encounter for blood transfusion     Gastric polyp     Hyperlipidemia     Hypertension     Hypotension, iatrogenic     Obesity     S/P implantation of automatic cardioverter/defibrillator (AICD)     Ventricular tachycardia        Past Surgical History:   Procedure Laterality Date    ABDOMINAL SURGERY      CARDIAC CATHETERIZATION      CARDIAC DEFIBRILLATOR PLACEMENT      CARDIAC DEFIBRILLATOR PLACEMENT      COLONOSCOPY      COLONOSCOPY N/A 3/9/2018    Procedure: COLONOSCOPY;  Surgeon: Andres Chatterjee MD;  Location: [facility] ([address] [place] [address]);  Service: Endoscopy;  Laterality: N/A;    ESOPHAGOGASTRODUODENOSCOPY      LEFT VENTRICULAR ASSIST DEVICE  07/27/2017       Review of patient's allergies indicates:  No Known Allergies    Current Facility-Administered Medications   Medication    amLODIPine tablet 5 mg    benzonatate capsule 100 mg    ciprofloxacin HCl tablet 500 mg    doxycycline tablet 100 mg    dronabinol capsule 5 mg    furosemide tablet 20 mg    HYDROcodone-acetaminophen 5-325 mg per tablet 1 tablet    lisinopril tablet 5 mg    magnesium oxide tablet 400 mg    pantoprazole EC tablet 40 mg    potassium chloride CR capsule 20 mEq    pravastatin tablet 20 mg    warfarin (COUMADIN) tablet 5 mg     Family History     Problem Relation (Age of Onset)    Heart disease Mother, Father        Social History Main Topics    Smoking status: Never Smoker    Smokeless tobacco: Never Used    Alcohol use No    Drug use: No    Sexual activity: Not Currently     Review of Systems   All other systems reviewed and are negative.    Objective:     Vital Signs (Most Recent):  Temp: 98.2 °F (36.8 °C) (06/14/18 0615)  Pulse: 79 (06/14/18 0800)  Resp: 16 (06/14/18 0615)  BP: (!) 110/0 (06/14/18 0627)  SpO2: 98 % (06/14/18 0615) Vital Signs (24h Range):  Temp:  [98.2 °F  (36.8 °C)-98.5 °F (36.9 °C)] 98.2 °F (36.8 °C)  Pulse:  [79-86] 79  Resp:  [13-28] 16  SpO2:  [98 %-100 %] 98 %  BP: ()/(0-81) 110/0     No data found.    There is no height or weight on file to calculate BMI.    No intake or output data in the 24 hours ending 06/14/18 0823    Physical Exam   Constitutional: He is oriented to person, place, and time. He appears well-nourished. No distress.   Neck: Neck supple. No JVD present.   Cardiovascular:   vad hum   Pulmonary/Chest: Effort normal and breath sounds normal. No respiratory distress. He has no wheezes. He has no rales.   Abdominal: Soft. Bowel sounds are normal. He exhibits no distension. There is no tenderness.   Musculoskeletal: He exhibits no edema.   Neurological: He is alert and oriented to person, place, and time. No cranial nerve deficit.   Skin: Skin is warm. Capillary refill takes less than 2 seconds. No erythema.   Psychiatric: He has a normal mood and affect.       Significant Labs:  CBC:    Recent Labs  Lab 06/11/18  0809 06/13/18 1920   WBC 4.70 5.09   RBC 3.90* 3.27*   HGB 9.3* 7.7*   HCT 30.8* 24.4*    159   MCV 79* 75@1rep*   MCH 23.8* 23.5*   MCHC 30.2* 31.6*     BNP:    Recent Labs  Lab 06/11/18  0809 06/13/18 1920   * 242*     CMP:    Recent Labs  Lab 06/11/18  0809 06/13/18 1920   * 77   CALCIUM 10.1 9.7   ALBUMIN 3.8 3.8   PROT 7.6 7.0    138   K 4.5 4.3   CO2 23 24    106   BUN 24* 21   CREATININE 1.1 1.0   ALKPHOS 76 69   ALT 19 16   AST 24 24   BILITOT 0.5 0.4      Coagulation:     Recent Labs  Lab 06/11/18  0809 06/13/18 1920   INR 2.5* 2.2*   APTT  --  36.6*     LDH:  No results for input(s): LDH in the last 72 hours.  Microbiology:  Microbiology Results (last 7 days)     ** No results found for the last 168 hours. **          I have reviewed all pertinent labs within the past 24 hours.

## 2018-06-14 NOTE — ASSESSMENT & PLAN NOTE
Suspect that this is aflutter with aberrancy after recent activity (walking on treadmill)  ICD interrogation pending, may need to adjust settings  No further events  No AAD at this time

## 2018-06-14 NOTE — TELEPHONE ENCOUNTER
Received page from ORIANA Emmanuel at  ED. Mr. Hayden presented stating that he had ICD shock this morning. Pt reports walking, made it upstairs, sat down and got an ICD shock. Pt did not page VAD Coordinator. He reported to EP clinic, sent in a transmission and was told by EP to report to the ED.   Cholo reports that pt is stable. MAP 81. SR 80s. No VAD alarms. I asked for routine labs, CXR and medtronic rep to come out to interrogate ICD. Will await results. Dr. Duff notified.

## 2018-06-14 NOTE — ED NOTES
Sindy LVAD coordinator notified of Defibrillator interrogation results, Sindy will speak to LVAD MD and call back with plan of care.

## 2018-06-14 NOTE — UM SECONDARY REVIEW
Physician Advisor External    Level of Care Issue    Denied IP or OBS - not appropriate     Dr. Angie Gonzalez @ [facility] determined Outpatient is best for patient.

## 2018-06-14 NOTE — PLAN OF CARE
Problem: Patient Care Overview  Goal: Plan of Care Review  Outcome: Ongoing (interventions implemented as appropriate)  Pt is A, A, Ox4. Calm, cooperative. Free of falls, trauma, and injuries. Skin intact. Pt educated on fall risk. Pt demonstrates and verbalizes understanding. VSS. IV protonix. Dressing change done. Pt NPO. Plan of care reviewed with pt.

## 2018-06-14 NOTE — ASSESSMENT & PLAN NOTE
-Medtronic ICD in place, last interrogation 5/23  -Recent prolonged MMVT (-300) 5/15, ATP unsuccessful, converted w/ 36J shock  -Last interrogation, added VT zone @188 (2 ATP, Burst x3, no shock), no AMS noted at that time  -ICD interrogation  -Per report, fast AF/AFL leading to ICD shock, await confirmation  -EP consult, follows with Dr. Jacque Rodriguez

## 2018-06-14 NOTE — TELEPHONE ENCOUNTER
Spoke with ORIANA Emmanuel at  ED. Medtronic rep, Vasyl, finished ICD interrogation: pt had Aflutter with RVR at 09:31 that reached 220 bpm. Device tried to pace pt out but when unable to do so, ICD shocked pt. Pt has had similar episodes of this rhythm. Pt also has a history of 7 short runs of NSVT. Pt has no VFib.   However, H/H has dropped since Monday. 9.3/31 to 7.7/24.   Spoke with Dr. Duff. Pt to be transferred to [facility] for admission due to H/H and suspected GIB.

## 2018-06-14 NOTE — PROGRESS NOTES
Patient admitted to CSU. Patient arrived to floor from admit office. Vital signs WNL; no acute distress noted; no complaints at this time; Pt oriented to room and unit; Telemetry initiated; Plan of care initiated. CSU orders  impplemented. Pt family at bedside. Bed in lowest, locked position; Side rails up X 2; Call bell in reach; will monitor patient; MD notified and orders will be implemented

## 2018-06-15 NOTE — NURSING
"Pt c/o numbness to BUE and chest feeling "funny". Pt unable to describe chest feeling but denies pain, tightness, or pressure. No HA or other symptoms. Kishore notified and said to just monitor. VSS. /70 (82) and HR 80. Will continue to monitor.  "
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
## 2018-06-15 NOTE — SUBJECTIVE & OBJECTIVE
Interval History: NAEON    Review of Systems   Constitution: Negative.   HENT: Negative.    Eyes: Negative.    Cardiovascular:        Icd shock   Respiratory: Negative.    Endocrine: Negative.    Skin: Negative.    Musculoskeletal: Negative.    Gastrointestinal: Negative.    Genitourinary: Negative.    Neurological: Negative.      Objective:     Vital Signs (Most Recent):  Temp: 98 °F (36.7 °C) (06/15/18 0740)  Pulse: 76 (06/15/18 0740)  Resp: 16 (06/15/18 0740)  BP: 103/61 (06/15/18 0740)  SpO2: 99 % (06/15/18 0740) Vital Signs (24h Range):  Temp:  [98 °F (36.7 °C)-98.9 °F (37.2 °C)] 98 °F (36.7 °C)  Pulse:  [76-91] 76  Resp:  [16] 16  SpO2:  [91 %-99 %] 99 %  BP: ()/(0-81) 103/61     Weight: 72.2 kg (159 lb 2.8 oz)  Body mass index is 23.51 kg/m².     SpO2: 99 %  O2 Device (Oxygen Therapy): room air    Physical Exam   Constitutional: He is oriented to person, place, and time. He appears well-developed and well-nourished.   HENT:   Head: Normocephalic and atraumatic.   Eyes: Conjunctivae and EOM are normal. Pupils are equal, round, and reactive to light.   Neck: Normal range of motion. Neck supple. No JVD present.   Cardiovascular: Exam reveals no gallop and no friction rub.    No murmur heard.  VAD hum audible and smooth     Pulmonary/Chest: Effort normal and breath sounds normal. No respiratory distress. He has no wheezes. He has no rales. He exhibits no tenderness.   Abdominal: Soft. Bowel sounds are normal. He exhibits no distension. There is no tenderness.   Musculoskeletal: Normal range of motion. He exhibits no edema or tenderness.   Neurological: He is alert and oriented to person, place, and time.   Skin: Skin is warm and dry. No erythema. No pallor.       Significant Labs:   EP:   Recent Labs  Lab 06/13/18  1920 06/14/18  0821 06/14/18  1051 06/15/18  0349     --  137 136   K 4.3  --  4.1 4.1     --  105 105   CO2 24  --  22* 25   GLU 77  --  72 89   BUN 21  --  16 17   CREATININE 1.0   --  0.9 1.1   CALCIUM 9.7  --  9.6 9.5   PROT 7.0  --   --  6.6   ALBUMIN 3.8  --   --  3.4*   BILITOT 0.4  --   --  0.6   ALKPHOS 69  --   --  58   AST 24  --   --  24   ALT 16  --   --  16   ANIONGAP 8  --  10 6*   ESTGFRAFRICA >60  --  >60.0 >60.0   EGFRNONAA >60  --  >60.0 >60.0   WBC 5.09 3.98  --  3.83*   HGB 7.7* 7.4*  --  7.6*   HCT 24.4* 24.2*  --  24.8*    141*  --  153   INR 2.2*  --  2.1* 2.1*       Significant Imaging: Echocardiogram:   2D echo with color flow doppler:   Results for orders placed or performed in visit on 05/15/18   2D Echo w/ Color Flow Doppler   Result Value Ref Range    EF 20 (A) 55 - 65    Est. PA Systolic Pressure 24.48     Mitral Valve Mobility NORMAL     Tricuspid Valve Regurgitation TRIVIAL

## 2018-06-15 NOTE — PLAN OF CARE
Problem: Patient Care Overview  Goal: Plan of Care Review  Outcome: Ongoing (interventions implemented as appropriate)  Patient progressing toward all goals. Plan of care discussed with patient, no questions at this time. Patient ambulating independently, fall precautions in place. Pt states he feels good and is hungry; Awaiting repeat CBC in am to check H & H stability; Wife at bedside; VS & LVAD numbers WNL. Will monitor.

## 2018-06-15 NOTE — PLAN OF CARE
[facility]              [facility] Transplant [facility]  [address] [place] [address]              [phone] after hours                    HOME  HEALTH ORDERS        Admit to Home Health     Diagnosis:       Patient Active Problem List   Diagnosis    Nonischemic cardiomyopathy    Encounter for monitoring amiodarone therapy    Essential hypertension    V-tach    Elevated PSA    Hepatitis B core antibody positive since 2012    Chronic combined systolic and diastolic congestive heart failure    Hyperbilirubinemia    Atrial tachycardia    Hyperglycemia    AICD (automatic cardioverter/defibrillator) present    LVAD (left ventricular assist device) present    Atrial fibrillation    Heart replaced by heart assist device    Anticoagulation monitoring, INR range 2-3    Subtherapeutic international normalized ratio (INR)    Constipation    Normocytic anemia    Anemia    Ileum ulcer    Melena    Pure hypercholesterolemia    Acute blood loss anemia    GI bleed    Chronic systolic congestive heart failure    Wound drainage         Patient is homebound due to:  LVAD s/p partial bowel resection     Diet: Heart Healthy Diet     Acitivities: As tolerated     Nursing:   SN to complete comprehensive assessment including routine vital signs. Instruct on disease process and s/s of complications to report to MD. Review/verify medication list sent home with the patient at time of discharge  and instruct patient/caregiver as needed. Frequency may be adjusted depending on start of care date.     Notify MD if SBP > 160 or < 90; DBP > 90 or < 50; HR > 120 or < 50; Temp > 101; Weight gain >3lbs in 1 day or 5lbs in 1 week.     LABS: INR 6/18/18     CONSULTS:      Physical Therapy to evaluate and treat. Evaluate for home safety and equipment needs; Establish/upgrade home exercise program. Perform / instruct on therapeutic exercises, gait training, transfer training, and  Range of Motion.     Occupational Therapy to evaluate and treat. Evaluate home environment for safety and equipment needs. Perform/Instruct on transfers, ADL training, ROM, and therapeutic exercises.     Send initial Home Health orders to HTS attending physician on call.  Send follow up questions to [phone] or fax:                                                                                                                                                                                                Post Transplant: [phone]

## 2018-06-15 NOTE — PROGRESS NOTES
Mr. Hayden will be discharged today following admission for ICD shock during walk on treadmill.  PMH includes HM3 lvad, recurrent GI bleeding with recent SB resection for an ileal ulcer,  staph epi bacteremia, and DLI.  During hospital stay, ICD settings were adjusted, and amiodarone was initiated.  Plan is for amiodarone 400mg BID through 6/22, followed by 400mg/day thereafter.  He will continue on cipro and doxy for 12 more days for DLI.  INR goal remains 2-3 requiring a bridge without ASA therapy.  Due to DDI, warfarin will be slightly decreased to 5mg/day, except 2.5mg on Sundays.  Recommend close monitoring of INR starting on Monday.  Medication card was reviewed with patient and wife.       Suman Hayden   Home Medication Instructions MYRANDA:[id number]    Printed on:06/15/18 1230   Medication Information                      amiodarone (PACERONE) 400 MG tablet  400mg (1tab) orally twice daily through 6/22, then 400mg (1tab) daily thereafter.             amLODIPine (NORVASC) 5 MG tablet  Take 1 tablet (5 mg total) by mouth once daily. Take one tablet 5mg by mouth once a day.             ciprofloxacin HCl (CIPRO) 500 MG tablet  Take 1 tablet (500 mg total) by mouth 2 (two) times daily.             doxycycline (VIBRA-TABS) 100 MG tablet  Take 1 tablet (100 mg total) by mouth 2 (two) times daily.             dronabinol (MARINOL) 5 MG capsule  Take 1 capsule (5 mg total) by mouth 3 (three) times daily before meals.             furosemide (LASIX) 20 MG tablet  Take 1 tablet (20 mg total) by mouth 2 (two) times daily.             HYDROcodone-acetaminophen (NORCO) 5-325 mg per tablet  Take 1 tablet by mouth every 6 (six) hours as needed for Pain.             lisinopril (PRINIVIL,ZESTRIL) 5 MG tablet  Take 1 tablet (5 mg total) by mouth once daily.             magnesium oxide (MAG-OX) 400 mg tablet  Take 1 tablet (400 mg total) by mouth 2 (two) times daily.             pantoprazole (PROTONIX) 40 MG tablet  Take 1  tablet (40 mg total) by mouth once daily.             polyethylene glycol (GLYCOLAX) 17 gram PwPk  Take 17 g by mouth daily as needed (constipation).             potassium chloride (MICRO-K) 10 MEQ CpSR  Take 2 capsules (20 mEq total) by mouth once daily.             pravastatin (PRAVACHOL) 20 MG tablet  Take 1 tablet (20 mg total) by mouth every evening.             warfarin (COUMADIN) 5 MG tablet  Take 5mg orally daily, except 2.5mg on Sundays

## 2018-06-15 NOTE — NURSING
Pt DC'd via wheelchair with escort tech. NAD. All IV's and tele removed. All belongings accounted for. VAD equipment reconciled. DC instructions reviewed with pt and Rx provided.

## 2018-06-15 NOTE — ASSESSMENT & PLAN NOTE
Suspect that this is aflutter with aberrancy after recent activity (walking on treadmill)  ICD interrogation reviewed, just settings to vf detection zone > 240  No further events  Amio 400mg po tid started, continue same

## 2018-06-15 NOTE — DISCHARGE SUMMARY
[facility]  Heart Transplant  Discharge Summary      Patient Name: Suman Hayden  MRN: [id number]  Admission Date: 6/14/2018  Hospital Length of Stay: 0 days  Discharge Date and Time: 06/15/2018 4:20 PM  Attending Physician: Deejay Duff Jr.,*   Discharging Provider: Kishore Paz NP  Primary Care Provider: Joe Ernst MD     HPI:67 year old man with stage D HFrEF/NICM s/p HM3 @5200, hx of GIB (8/17, 12/17, 1/18, 3/18) s/p ex lap and partial SB resection transferred from  after presenting to ED with ICD shock. He was in his usual state of health, no preceding symptoms, when he felt a shock. Felt back to baseline within several seconds. Called EP clinic and advised to come to ER. At ER, noted to have decreased in Hb from 9.3 two days ago to 7.7. Tn .029, BNP 200s. Denies any melena, hematochezia, hematemesis. Taking all medications as prescribed. Per report, interrogation revealed fast AFL above VT threshold resulting in ICD shock.  * No surgery found *     Hospital Course: HH remained stable and he had no more episodes of bleeding. EP was consulted and felt that shock was secondary to aflutter with aberrancy. His VF detectiopn rate was increased to to 240 from 200. Possible plan for RFA of the CTI to be arranged as outpatient with Dr eZe. He was Dc'd home in good condition and will f/u in 1-2 weeks.     Procedure: Device Interrogation Including analysis of device parameters    TXP LVAD INTERROGATIONS 6/15/2018 6/15/2018 6/15/2018 6/15/2018 6/14/2018 6/14/2018 6/14/2018   Type HeartMate3 HeartMate3 HeartMate3 HeartMate3 HeartMate3 - -   Flow 3.9 3.9 3.9 4.1 3.9 4.1 4.0    Speed 5200 5200 5200 5200 5200 5200 5200   PI 4.9 4.8 4.5 3.8 3.1 4.0 3.7   Power ([place]) 3.6 3.6 3.5 3.5 3.5 3.5 3.5   LSL - 4800 - - - - -   Pulsatility - - - - - - -        Consults         Status Ordering Provider     Inpatient consult to Electrophysiology  Once     Provider:  (Not yet assigned)     Completed MAHSA SERRANO        Pending Diagnostic Studies:     None        Final Active Diagnoses:    Diagnosis Date Noted POA    PRINCIPAL PROBLEM:  ICD (implantable cardioverter-defibrillator) discharge [Z45.02] 06/14/2018 Not Applicable    Anemia [D64.9] 12/08/2017 Yes    LVAD (left ventricular assist device) present [Z95.811] 07/29/2017 Not Applicable    Inappropriate shocks from ICD (implantable cardioverter-defibrillator) [T82.198A] 06/14/2018 Yes    Chronic systolic congestive heart failure [I50.22]  Yes      Problems Resolved During this Admission:    Diagnosis Date Noted Date Resolved POA      Discharged Condition: good    Patient Instructions:   No discharge procedures on file.  Medications:  Reconciled Home Medications:      Medication List      START taking these medications    amiodarone 400 MG tablet  Commonly known as:  PACERONE  400mg (1tab) orally twice daily through 6/22, then 400mg (1tab) daily thereafter.        CHANGE how you take these medications    warfarin 5 MG tablet  Commonly known as:  COUMADIN  Take 5mg orally daily, except 2.5mg on Sundays  What changed:  additional instructions        CONTINUE taking these medications    amLODIPine 5 MG tablet  Commonly known as:  NORVASC  Take 1 tablet (5 mg total) by mouth once daily. Take one tablet 5mg by mouth once a day.     ciprofloxacin HCl 500 MG tablet  Commonly known as:  CIPRO  Take 1 tablet (500 mg total) by mouth 2 (two) times daily.     doxycycline 100 MG tablet  Commonly known as:  VIBRA-TABS  Take 1 tablet (100 mg total) by mouth 2 (two) times daily.     dronabinol 5 MG capsule  Commonly known as:  MARINOL  Take 1 capsule (5 mg total) by mouth 3 (three) times daily before meals.     furosemide 20 MG tablet  Commonly known as:  LASIX  Take 1 tablet (20 mg total) by mouth 2 (two) times daily.     HYDROcodone-acetaminophen 5-325 mg per tablet  Commonly known as:  NORCO  Take 1 tablet by mouth every 6 (six) hours as needed for  Pain.     lisinopril 5 MG tablet  Commonly known as:  PRINIVIL,ZESTRIL  Take 1 tablet (5 mg total) by mouth once daily.     magnesium oxide 400 mg tablet  Commonly known as:  MAG-OX  Take 1 tablet (400 mg total) by mouth 2 (two) times daily.     pantoprazole 40 MG tablet  Commonly known as:  PROTONIX  Take 1 tablet (40 mg total) by mouth once daily.     polyethylene glycol 17 gram Pwpk  Commonly known as:  GLYCOLAX  Take 17 g by mouth daily as needed (constipation).     potassium chloride 10 MEQ Cpsr  Commonly known as:  MICRO-K  Take 2 capsules (20 mEq total) by mouth once daily.     pravastatin 20 MG tablet  Commonly known as:  PRAVACHOL  Take 1 tablet (20 mg total) by mouth every evening.        STOP taking these medications    benzonatate 100 MG capsule  Commonly known as:  KARO Paz NP  Heart Transplant  [facility]

## 2018-06-15 NOTE — PROGRESS NOTES
[facility]  Cardiac Electrophysiology  Progress Note    Admission Date: 6/14/2018  Code Status: Full Code   Attending Physician: Deejay Duff Jr.,*   Expected Discharge Date: 6/18/2018  Principal Problem:<principal problem not specified>    Subjective:     Interval History: NAEON    Review of Systems   Constitution: Negative.   HENT: Negative.    Eyes: Negative.    Cardiovascular:        Icd shock   Respiratory: Negative.    Endocrine: Negative.    Skin: Negative.    Musculoskeletal: Negative.    Gastrointestinal: Negative.    Genitourinary: Negative.    Neurological: Negative.      Objective:     Vital Signs (Most Recent):  Temp: 98 °F (36.7 °C) (06/15/18 0740)  Pulse: 76 (06/15/18 0740)  Resp: 16 (06/15/18 0740)  BP: 103/61 (06/15/18 0740)  SpO2: 99 % (06/15/18 0740) Vital Signs (24h Range):  Temp:  [98 °F (36.7 °C)-98.9 °F (37.2 °C)] 98 °F (36.7 °C)  Pulse:  [76-91] 76  Resp:  [16] 16  SpO2:  [91 %-99 %] 99 %  BP: ()/(0-81) 103/61     Weight: 72.2 kg (159 lb 2.8 oz)  Body mass index is 23.51 kg/m².     SpO2: 99 %  O2 Device (Oxygen Therapy): room air    Physical Exam   Constitutional: He is oriented to person, place, and time. He appears well-developed and well-nourished.   HENT:   Head: Normocephalic and atraumatic.   Eyes: Conjunctivae and EOM are normal. Pupils are equal, round, and reactive to light.   Neck: Normal range of motion. Neck supple. No JVD present.   Cardiovascular: Exam reveals no gallop and no friction rub.    No murmur heard.  VAD hum audible and smooth     Pulmonary/Chest: Effort normal and breath sounds normal. No respiratory distress. He has no wheezes. He has no rales. He exhibits no tenderness.   Abdominal: Soft. Bowel sounds are normal. He exhibits no distension. There is no tenderness.   Musculoskeletal: Normal range of motion. He exhibits no edema or tenderness.   Neurological: He is alert and oriented to person, place, and time.   Skin: Skin is warm and  dry. No erythema. No pallor.       Significant Labs:   EP:   Recent Labs  Lab 06/13/18  1920 06/14/18  0821 06/14/18  1051 06/15/18  0349     --  137 136   K 4.3  --  4.1 4.1     --  105 105   CO2 24  --  22* 25   GLU 77  --  72 89   BUN 21  --  16 17   CREATININE 1.0  --  0.9 1.1   CALCIUM 9.7  --  9.6 9.5   PROT 7.0  --   --  6.6   ALBUMIN 3.8  --   --  3.4*   BILITOT 0.4  --   --  0.6   ALKPHOS 69  --   --  58   AST 24  --   --  24   ALT 16  --   --  16   ANIONGAP 8  --  10 6*   ESTGFRAFRICA >60  --  >60.0 >60.0   EGFRNONAA >60  --  >60.0 >60.0   WBC 5.09 3.98  --  3.83*   HGB 7.7* 7.4*  --  7.6*   HCT 24.4* 24.2*  --  24.8*    141*  --  153   INR 2.2*  --  2.1* 2.1*       Significant Imaging: Echocardiogram:   2D echo with color flow doppler:   Results for orders placed or performed in visit on 05/15/18   2D Echo w/ Color Flow Doppler   Result Value Ref Range    EF 20 (A) 55 - 65    Est. PA Systolic Pressure 24.48     Mitral Valve Mobility NORMAL     Tricuspid Valve Regurgitation TRIVIAL      Assessment and Plan:     Inappropriate shocks from ICD (implantable cardioverter-defibrillator)    Suspect that this is aflutter with aberrancy after recent activity (walking on treadmill)  ICD interrogation reviewed, just settings to vf detection zone > 240  No further events  Amio 400mg po tid started, continue same            Casey Newman MD  Cardiac Electrophysiology  [facility]

## 2018-06-16 NOTE — PATIENT INSTRUCTIONS
Home Health SOC 03/30/2018 to 05/28/2018 [facility] Dr Giovanni Hayden: SN, PT, OT Services.

## 2018-06-18 NOTE — PROGRESS NOTES
Wife confirmed dose 2.5 mg sun and 5 mg all other days .  Patient is currently on two antibiotics ''He will continue on cipro and doxy for 12 more days for DLI'' .  Reports mild nasal drip.

## 2018-06-18 NOTE — PROGRESS NOTES
Mr. Hayden will be discharged today following admission for ICD shock during walk on treadmill.  PMH includes HM3 lvad, recurrent GI bleeding with recent SB resection for an ileal ulcer,  staph epi bacteremia, and DLI.  During hospital stay, ICD settings were adjusted, and amiodarone was initiated.  Plan is for amiodarone 400mg BID through 6/22, followed by 400mg/day thereafter.  He will continue on cipro and doxy for 12 more days for DLI.  INR goal remains 2-3 requiring a bridge without ASA therapy.  Due to DDI, warfarin will be slightly decreased to 5mg/day, except 2.5mg on Sundays.  Recommend close monitoring of INR starting on Monday.  Medication card was reviewed with patient and wife.      Wife confirmed dose 2.5 mg sun and 5 mg all other days .  Wife stated HH has already came out and drawn labs 6/18.  Reports mild nasal drip.

## 2018-06-19 NOTE — PROGRESS NOTES
Pt's care giver called stating they lost pt's battery clips upon discharge. I checked CSU and could not find battery clips. Instructed pt to come in and pickup new set. Pt's caregiver will arrange clips to be picked up.

## 2018-06-20 NOTE — TELEPHONE ENCOUNTER
Mrs. Hayden called looking for the 2 battery clips that they left behind when they were D/C home last week.  Mrs. Hayden tells me that Silverio was supposed to bring 2 clips to Mrs. ORLANDO room and her  was going to pick them up and bring them back to the [facility].  Silverio has left for the day, called him to inquire.  Silverio forgot to bring the clips up to her room. I called the [facility] charge nurse to ask if they have been able to locate them and explained Mrs. Hayden mentioned Mary name.  Britt will call [facility] to see where the clips are located.  In the meanwhile, I began to walk to bring clips to Mrs. ORLANDO's room when Britt called back to report they have the clips at the .  Called Mrs. Hayden and asker her to have Mr. ORLANDO stop at the [facility] nurses station to pick the clips up.  Mrs. Hayden verbalized understanding and agreement.

## 2018-06-25 PROBLEM — R20.2 TINGLING IN EXTREMITIES: Status: ACTIVE | Noted: 2018-01-01

## 2018-06-25 NOTE — ED TRIAGE NOTES
68 yr old male pt presents to the ed with complaints of sob and right and left arm tingling x 1 day. Pt also complains of sob. Pt denies chest pain or nausea. Pt now has both symptoms resolved and is aox 3. Pt is lvad pt and lvad team has been  notified by charge nurse.

## 2018-06-25 NOTE — ASSESSMENT & PLAN NOTE
Procedure: Device Interrogation Including analysis of device parameters  Current Settings: Ventricular Assist Device  Review of device function is stable/unstable stable    TXP LVAD INTERROGATIONS 6/15/2018 6/15/2018 6/15/2018 6/15/2018 6/14/2018 6/14/2018 6/14/2018   Type HeartMate3 HeartMate3 HeartMate3 HeartMate3 HeartMate3 - -   Flow 3.9 3.9 3.9 4.1 3.9 4.1 4.0    Speed 5200 5200 5200 5200 5200 5200 5200   PI 4.9 4.8 4.5 3.8 3.1 4.0 3.7   Power ([place]) 3.6 3.6 3.5 3.5 3.5 3.5 3.5   LSL - 4800 - - - - -   Pulsatility - - - - - - -     HM3 @ 5200 RPM  INR goal 2-3, on coumadin, supra-therapeutic likely due to amiodarone initiation, will need dose adjustment  Antiplatelets: ASA stopped 12/2017 for GIB/ileal ulcer  MAP goal 60-80, continue home meds  VAD interrogation in AM

## 2018-06-25 NOTE — SUBJECTIVE & OBJECTIVE
Past Medical History:   Diagnosis Date    Arthritis     Cardiomyopathy     CHF (congestive heart failure)     Coronary artery disease     Encounter for blood transfusion     Gastric polyp     Hyperlipidemia     Hypertension     Hypotension, iatrogenic     Obesity     S/P implantation of automatic cardioverter/defibrillator (AICD)     Ventricular tachycardia        Past Surgical History:   Procedure Laterality Date    ABDOMINAL SURGERY      CARDIAC CATHETERIZATION      CARDIAC DEFIBRILLATOR PLACEMENT      CARDIAC DEFIBRILLATOR PLACEMENT      COLONOSCOPY      COLONOSCOPY N/A 3/9/2018    Procedure: COLONOSCOPY;  Surgeon: Andres Chatterjee MD;  Location: [address]);  Service: Endoscopy;  Laterality: N/A;    ESOPHAGOGASTRODUODENOSCOPY      LEFT VENTRICULAR ASSIST DEVICE  07/27/2017       Review of patient's allergies indicates:  No Known Allergies    No current facility-administered medications on file prior to encounter.      Current Outpatient Prescriptions on File Prior to Encounter   Medication Sig    amiodarone (PACERONE) 400 MG tablet 400mg (1tab) orally twice daily through 6/22, then 400mg (1tab) daily thereafter.    amLODIPine (NORVASC) 5 MG tablet Take 1 tablet (5 mg total) by mouth once daily. Take one tablet 5mg by mouth once a day.    ciprofloxacin HCl (CIPRO) 500 MG tablet Take 1 tablet (500 mg total) by mouth 2 (two) times daily.    doxycycline (VIBRA-TABS) 100 MG tablet Take 1 tablet (100 mg total) by mouth 2 (two) times daily.    dronabinol (MARINOL) 5 MG capsule Take 1 capsule (5 mg total) by mouth 3 (three) times daily before meals.    furosemide (LASIX) 20 MG tablet Take 1 tablet (20 mg total) by mouth 2 (two) times daily.    HYDROcodone-acetaminophen (NORCO) 5-325 mg per tablet Take 1 tablet by mouth every 6 (six) hours as needed for Pain.    lisinopril (PRINIVIL,ZESTRIL) 5 MG tablet Take 1 tablet (5 mg total) by mouth once daily.    magnesium oxide (MAG-OX) 400 mg  tablet Take 1 tablet (400 mg total) by mouth 2 (two) times daily.    pantoprazole (PROTONIX) 40 MG tablet Take 1 tablet (40 mg total) by mouth once daily.    polyethylene glycol (GLYCOLAX) 17 gram PwPk Take 17 g by mouth daily as needed (constipation).    potassium chloride (MICRO-K) 10 MEQ CpSR Take 2 capsules (20 mEq total) by mouth once daily.    pravastatin (PRAVACHOL) 20 MG tablet Take 1 tablet (20 mg total) by mouth every evening.    warfarin (COUMADIN) 5 MG tablet Take 5mg orally daily, except 2.5mg on Sundays     Family History     Problem Relation (Age of Onset)    Heart disease Mother, Father        Social History Main Topics    Smoking status: Never Smoker    Smokeless tobacco: Never Used    Alcohol use No    Drug use: No    Sexual activity: Not Currently     Review of Systems   Constitution: Negative for chills, diaphoresis, fever and weight loss.   HENT: Negative for congestion, hoarse voice and sore throat.    Eyes: Negative for blurred vision, double vision and visual disturbance.   Cardiovascular: Negative for chest pain, claudication, dyspnea on exertion, irregular heartbeat, leg swelling, near-syncope, orthopnea, palpitations, paroxysmal nocturnal dyspnea and syncope.   Respiratory: Negative for cough, hemoptysis, shortness of breath and sleep disturbances due to breathing.    Endocrine: Negative for cold intolerance and heat intolerance.   Hematologic/Lymphatic: Does not bruise/bleed easily.   Gastrointestinal: Negative for abdominal pain, constipation, diarrhea, nausea and vomiting.   Genitourinary: Negative for dysuria.   Neurological: Positive for sensory change. Negative for focal weakness.     Objective:     Vital Signs (Most Recent):  Temp: 99 °F (37.2 °C) (06/25/18 0141)  Pulse: 80 (06/25/18 0400)  Resp: 16 (06/25/18 0400)  BP: 100/68 (06/25/18 0141)  SpO2: 100 % (06/25/18 0400) Vital Signs (24h Range):  Temp:  [99 °F (37.2 °C)] 99 °F (37.2 °C)  Pulse:  [80] 80  Resp:  [16-20]  16  SpO2:  [99 %-100 %] 100 %  BP: (100)/(68) 100/68     Weight: 72.1 kg (159 lb)  Body mass index is 23.48 kg/m².    SpO2: 100 %       No intake or output data in the 24 hours ending 06/25/18 0451    Lines/Drains/Airways     Line                 VAD 07/27/17 1312 Left ventricular assist device HeartMate 3 332 days                Physical Exam   Constitutional: He is oriented to person, place, and time. He appears well-developed and well-nourished.   HENT:   Head: Normocephalic and atraumatic.   Eyes: EOM are normal. Pupils are equal, round, and reactive to light.   Neck: No JVD present.   Cardiovascular:   Musical LVAD hum noted   Pulmonary/Chest: Effort normal and breath sounds normal. No respiratory distress. He has no wheezes.   Abdominal: Soft. Bowel sounds are normal. He exhibits no distension.   Musculoskeletal: He exhibits no edema.   Neurological: He is alert and oriented to person, place, and time.   Skin: Skin is warm and dry.   Psychiatric: He has a normal mood and affect.       Significant Labs:   Recent Lab Results       06/25/18  0300      Immature Granulocytes 0.2     Immature Grans (Abs) 0.01  Comment:  Mild elevation in immature granulocytes is non specific and   can be seen in a variety of conditions including stress response,   acute inflammation, trauma and pregnancy. Correlation with other   laboratory and clinical findings is essential.       Albumin 3.8     Alkaline Phosphatase 58     ALT 13     Anion Gap 10     AST 27     Baso # 0.03     Basophil% 0.5     Total Bilirubin 0.4  Comment:  For infants and newborns, interpretation of results should be based  on gestational age, weight and in agreement with clinical  observations.  Premature Infant recommended reference ranges:  Up to 24 hours.............<8.0 mg/dL  Up to 48 hours............<12.0 mg/dL  3-5 days..................<15.0 mg/dL  6-29 days.................<15.0 mg/dL         Comment:  Values of less than 100 pg/ml are  consistent with non-CHF populations.(H)     BUN, Bld 21     Calcium 9.5     Chloride 101     CO2 22(L)     Creatinine 1.1     Differential Method Automated     eGFR if African American >60.0     eGFR if non  >60.0  Comment:  Calculation used to obtain the estimated glomerular filtration  rate (eGFR) is the CKD-EPI equation.        Eos # 0.5     Eosinophil% 8.4(H)     Glucose 91     Gran # (ANC) 3.0     Gran% 52.8     Hematocrit 24.6(L)     Hemoglobin 7.5(L)     Coumadin Monitoring INR 4.1  Comment:  Coumadin Therapy:  2.0 - 3.0 for INR for all indicators except mechanical heart valves  and antiphospholipid syndromes which should use 2.5 - 3.5.  (H)     Lymph # 1.7     Lymph% 29.2     Magnesium 2.3     MCH 23.9(L)     MCHC 30.5(L)     MCV 78(L)     Mono # 0.5     Mono% 8.9     MPV 11.1     nRBC 0     Phosphorus 3.0     Platelets 189     Potassium 4.2     Total Protein 7.2     Protime 39.5(H)     RBC 3.14(L)     RDW 19.8(H)     Sodium 133(L)     TSH 3.346     WBC 5.72           Significant Imaging: Echocardiogram:   2D echo with color flow doppler:   Results for orders placed or performed in visit on 05/15/18   2D Echo w/ Color Flow Doppler   Result Value Ref Range    EF 20 (A) 55 - 65    Est. PA Systolic Pressure 24.48     Mitral Valve Mobility NORMAL     Tricuspid Valve Regurgitation TRIVIAL

## 2018-06-25 NOTE — PLAN OF CARE
Problem: Patient Care Overview  Goal: Plan of Care Review  Outcome: Ongoing (interventions implemented as appropriate)  Discussed Plan of Care with patient. VS stable. Ambulates well independently. Fall precautions in place. Bleeding precautions reviewed and maintained. Pain denied. INR 4.1. Coumarin being held this evening per MELISSA Duenas. Patient anticipates discharge tomorrow pending no symptoms of tingling/ numbness to BUE. Patient remained free of falls/ injury. All questions and concerns were addressed. Will continue to monitor patient.

## 2018-06-25 NOTE — ED NOTES
Pain: Denies at present.    Psychosocial: Patient is calm and cooperative. Patients insight and judgement are appropriate to situation. Appears clean, well maintained, with clothing appropriate to environment. No evidence of delusions, hallucinations, or psychosis.    Neuro: Eyes open spontaneously. Awake, alert, oriented x 4. Speech clear and appropriate. Tolerating saliva secretions well. Able to follow commands, demonstrating ability to actively and appropriately communicate within context of current conversation. Symmetrical facial muscles. Moving all extremities well with no noted weakness. Adequate muscle tone present. Movement is purposeful. No evidence of impaired sensation. Responds to external stimuli with appropriate reflexes.     Airway: Bilateral chest rise and fall. RR regular and non-labored. Air entry patent and clear x 5 lobes of the lungs. No crepitus or subcutaneous emphysema noted on palpation.     Circulatory: Skin warm, dry, and pink. Capillary refill/skin blanching less than 3 seconds to distal of 4 extremities. Placed on CM in NSR without ectopy. Pt has lvad heartmate 3     Abdomen: Abdomen obese, soft and non-distended. Positive normo-active bowel sounds x 4 quadrants.     Urinary: Patient reports routine urination without pain, frequency, or urgency. Voids independently. Reports urine appears coby/yellow in color.    Extremities: No redness, heat, swelling, deformity, or pain.     Skin: Intact with no bruising/discolorations noted.

## 2018-06-25 NOTE — HPI
"67 year old man with stage D HFrEF/NICM s/p HM3 @5200, hx of GIB (8/17, 12/17, 1/18, 3/18) s/p ex lap and partial SB resection, Medtronic ICD, transferred from  via ambulance for bilateral arm tingling that started yesterday at 5pm. He stated the sensation was sudden in onset and was most likened to "putting your arms in the freezer". The sensation then changed to a hot sensation at some point yesterday evening. He called the on call VAD coordinator and was told he had a bed at [facility] so he was transferred by ambulance. He was recently admitted and discharged on 6/15 for ICD shock thought to be due to aflutter with abberancy as well as low hgb. He was started on amiodarone during his last admission for this with a plan for ablation outpatient. He is currently taking 400mg once a day. Since discharge he said he had been feeling overall well however had some generalized weakness and feeling "not right" which he attributes to the amiodarone. He denies any symptoms of melena or hematochezia at this time.     In the ED his vitals were WNL and his device had no new arrythmias(last interrogation 6/15). His INR was elevated at 4.1.     "

## 2018-06-25 NOTE — HPI
"67 year old man with stage D HFrEF/NICM s/p HM3 @5200, hx of GIB (8/17, 12/17, 1/18, 3/18) s/p ex lap and partial SB resection, Medtronic ICD, transferred from  via ambulance for bilateral arm tingling that started yesterday at 5pm. He stated the sensation was sudden in onset and was most likened to "putting your arms in the freezer". The sensation then changed to a hot sensation at some point yesterday evening. He called the on call VAD coordinator and was told he had a bed at [facility] so he was transferred by ambulance. He was recently admitted and discharged on 6/15 for ICD shock thought to be due to aflutter with abberancy as well as low hgb. He was started on amiodarone during his last admission for this with a plan for ablation outpatient. He is currently taking 400mg once a day. Since discharge he said he had been feeling overall well however had some generalized weakness and feeling "not right" which he attributes to the amiodarone. He denies any symptoms of melena or hematochezia at this time.     In the ED his vitals were WNL and his device had no new arrythmias(last interrogation 6/15). His INR was elevated at 4.1.   "

## 2018-06-25 NOTE — ASSESSMENT & PLAN NOTE
No ICD shocks noted and no new arrhythmias   Plan for ablation outpatient(sees Dr. Winchester in [place])

## 2018-06-25 NOTE — SUBJECTIVE & OBJECTIVE
Past Medical History:   Diagnosis Date    Arthritis     Cardiomyopathy     CHF (congestive heart failure)     Coronary artery disease     Encounter for blood transfusion     Gastric polyp     Hyperlipidemia     Hypertension     Hypotension, iatrogenic     Obesity     S/P implantation of automatic cardioverter/defibrillator (AICD)     Ventricular tachycardia        Past Surgical History:   Procedure Laterality Date    ABDOMINAL SURGERY      CARDIAC CATHETERIZATION      CARDIAC DEFIBRILLATOR PLACEMENT      CARDIAC DEFIBRILLATOR PLACEMENT      COLONOSCOPY      COLONOSCOPY N/A 3/9/2018    Procedure: COLONOSCOPY;  Surgeon: Andres Chatterjee MD;  Location: [address]);  Service: Endoscopy;  Laterality: N/A;    ESOPHAGOGASTRODUODENOSCOPY      LEFT VENTRICULAR ASSIST DEVICE  07/27/2017       Review of patient's allergies indicates:  No Known Allergies    No current facility-administered medications on file prior to encounter.      Current Outpatient Prescriptions on File Prior to Encounter   Medication Sig    amiodarone (PACERONE) 400 MG tablet 400mg (1tab) orally twice daily through 6/22, then 400mg (1tab) daily thereafter.    amLODIPine (NORVASC) 5 MG tablet Take 1 tablet (5 mg total) by mouth once daily. Take one tablet 5mg by mouth once a day.    ciprofloxacin HCl (CIPRO) 500 MG tablet Take 1 tablet (500 mg total) by mouth 2 (two) times daily.    doxycycline (VIBRA-TABS) 100 MG tablet Take 1 tablet (100 mg total) by mouth 2 (two) times daily.    dronabinol (MARINOL) 5 MG capsule Take 1 capsule (5 mg total) by mouth 3 (three) times daily before meals.    furosemide (LASIX) 20 MG tablet Take 1 tablet (20 mg total) by mouth 2 (two) times daily.    HYDROcodone-acetaminophen (NORCO) 5-325 mg per tablet Take 1 tablet by mouth every 6 (six) hours as needed for Pain.    lisinopril (PRINIVIL,ZESTRIL) 5 MG tablet Take 1 tablet (5 mg total) by mouth once daily.    magnesium oxide (MAG-OX) 400 mg  tablet Take 1 tablet (400 mg total) by mouth 2 (two) times daily.    pantoprazole (PROTONIX) 40 MG tablet Take 1 tablet (40 mg total) by mouth once daily.    polyethylene glycol (GLYCOLAX) 17 gram PwPk Take 17 g by mouth daily as needed (constipation).    potassium chloride (MICRO-K) 10 MEQ CpSR Take 2 capsules (20 mEq total) by mouth once daily.    pravastatin (PRAVACHOL) 20 MG tablet Take 1 tablet (20 mg total) by mouth every evening.    warfarin (COUMADIN) 5 MG tablet Take 5mg orally daily, except 2.5mg on Sundays     Family History     Problem Relation (Age of Onset)    Heart disease Mother, Father        Social History Main Topics    Smoking status: Never Smoker    Smokeless tobacco: Never Used    Alcohol use No    Drug use: No    Sexual activity: Not Currently     ROS  Objective:     Vital Signs (Most Recent):  Temp: 99 °F (37.2 °C) (18 0141)  Pulse: 80 (18 0400)  Resp: 16 (18 0400)  BP: 100/68 (18 0141)  SpO2: 100 % (18 0400) Vital Signs (24h Range):  Temp:  [99 °F (37.2 °C)] 99 °F (37.2 °C)  Pulse:  [80] 80  Resp:  [16-20] 16  SpO2:  [99 %-100 %] 100 %  BP: (100)/(68) 100/68     Weight: 72.1 kg (159 lb)  Body mass index is 23.48 kg/m².    SpO2: 100 %       No intake or output data in the 24 hours ending 18 0522    Lines/Drains/Airways     Line                 VAD 17 1312 Left ventricular assist device HeartMate 3 332 days                Physical Exam    Significant Labs: {Labs:68891}    Significant Imaging: {Imagin}

## 2018-06-25 NOTE — ASSESSMENT & PLAN NOTE
Unclear etiology, no clinical evidence of any extremity abnormality, no swelling noted and distal pulses intact(ocassionally palpable)  Electrolytes are WNL

## 2018-06-25 NOTE — SUBJECTIVE & OBJECTIVE
Past Medical History:   Diagnosis Date    Arthritis     Cardiomyopathy     CHF (congestive heart failure)     Coronary artery disease     Encounter for blood transfusion     Gastric polyp     Hyperlipidemia     Hypertension     Hypotension, iatrogenic     Obesity     S/P implantation of automatic cardioverter/defibrillator (AICD)     Ventricular tachycardia        Past Surgical History:   Procedure Laterality Date    ABDOMINAL SURGERY      CARDIAC CATHETERIZATION      CARDIAC DEFIBRILLATOR PLACEMENT      CARDIAC DEFIBRILLATOR PLACEMENT      COLONOSCOPY      COLONOSCOPY N/A 3/9/2018    Procedure: COLONOSCOPY;  Surgeon: Andres Chatterjee MD;  [facility] [place] [address]);  Service: Endoscopy;  Laterality: N/A;    ESOPHAGOGASTRODUODENOSCOPY      LEFT VENTRICULAR ASSIST DEVICE  07/27/2017       Review of patient's allergies indicates:  No Known Allergies    Current Facility-Administered Medications   Medication    amiodarone tablet 400 mg    amLODIPine tablet 5 mg    ciprofloxacin HCl tablet 500 mg    doxycycline tablet 100 mg    dronabinol capsule 5 mg    furosemide tablet 20 mg    HYDROcodone-acetaminophen 5-325 mg per tablet 1 tablet    lisinopril tablet 5 mg    magnesium oxide tablet 400 mg    pantoprazole EC tablet 40 mg    polyethylene glycol packet 17 g    pravastatin tablet 20 mg     Current Outpatient Prescriptions   Medication Sig    amiodarone (PACERONE) 400 MG tablet 400mg (1tab) orally twice daily through 6/22, then 400mg (1tab) daily thereafter.    amLODIPine (NORVASC) 5 MG tablet Take 1 tablet (5 mg total) by mouth once daily. Take one tablet 5mg by mouth once a day.    ciprofloxacin HCl (CIPRO) 500 MG tablet Take 1 tablet (500 mg total) by mouth 2 (two) times daily.    doxycycline (VIBRA-TABS) 100 MG tablet Take 1 tablet (100 mg total) by mouth 2 (two) times daily.    dronabinol (MARINOL) 5 MG capsule Take 1 capsule (5 mg total) by mouth 3 (three) times daily before  meals.    furosemide (LASIX) 20 MG tablet Take 1 tablet (20 mg total) by mouth 2 (two) times daily.    HYDROcodone-acetaminophen (NORCO) 5-325 mg per tablet Take 1 tablet by mouth every 6 (six) hours as needed for Pain.    lisinopril (PRINIVIL,ZESTRIL) 5 MG tablet Take 1 tablet (5 mg total) by mouth once daily.    magnesium oxide (MAG-OX) 400 mg tablet Take 1 tablet (400 mg total) by mouth 2 (two) times daily.    pantoprazole (PROTONIX) 40 MG tablet Take 1 tablet (40 mg total) by mouth once daily.    polyethylene glycol (GLYCOLAX) 17 gram PwPk Take 17 g by mouth daily as needed (constipation).    potassium chloride (MICRO-K) 10 MEQ CpSR Take 2 capsules (20 mEq total) by mouth once daily.    pravastatin (PRAVACHOL) 20 MG tablet Take 1 tablet (20 mg total) by mouth every evening.    warfarin (COUMADIN) 5 MG tablet Take 5mg orally daily, except 2.5mg on Sundays     Family History     Problem Relation (Age of Onset)    Heart disease Mother, Father        Social History Main Topics    Smoking status: Never Smoker    Smokeless tobacco: Never Used    Alcohol use No    Drug use: No    Sexual activity: Not Currently     Review of Systems   Constitutional: Positive for fatigue.   Respiratory: Negative for shortness of breath.    Gastrointestinal: Negative for blood in stool.   Endocrine: Positive for cold intolerance and heat intolerance.   Neurological: Negative for facial asymmetry, speech difficulty, weakness, numbness and headaches.   All other systems reviewed and are negative.    Objective:     Vital Signs (Most Recent):  Temp: 99 °F (37.2 °C) (06/25/18 0141)  Pulse: 80 (06/25/18 0400)  Resp: 16 (06/25/18 0400)  BP: 100/68 (06/25/18 0141)  SpO2: 100 % (06/25/18 0400) Vital Signs (24h Range):  Temp:  [99 °F (37.2 °C)] 99 °F (37.2 °C)  Pulse:  [80] 80  Resp:  [16-20] 16  SpO2:  [99 %-100 %] 100 %  BP: (100)/(68) 100/68     Patient Vitals for the past 72 hrs (Last 3 readings):   Weight   06/25/18 0141 72.1 kg  (159 lb)     Body mass index is 23.48 kg/m².    No intake or output data in the 24 hours ending 06/25/18 0524    Physical Exam  GEN: Alert and oriented in NAD  NECK: no JVD appreciated   CVS: + LVAD hum  PULM: CTAB no rales  ABD: NT/ND BS +  Extremities: warm and dry, no edema  NEURO: Alert and oriented x 3  PSYCH: appropriate affect.         Significant Labs:  CBC:    Recent Labs  Lab 06/25/18  0300   WBC 5.72   RBC 3.14*   HGB 7.5*   HCT 24.6*      MCV 78*   MCH 23.9*   MCHC 30.5*     BNP:    Recent Labs  Lab 06/25/18  0300   *     CMP:    Recent Labs  Lab 06/25/18  0300   GLU 91   CALCIUM 9.5   ALBUMIN 3.8   PROT 7.2   *   K 4.2   CO2 22*      BUN 21   CREATININE 1.1   ALKPHOS 58   ALT 13   AST 27   BILITOT 0.4      Coagulation:     Recent Labs  Lab 06/21/18 06/25/18  0300   INR 3.0 4.1*     LDH:  No results for input(s): LDH in the last 72 hours.  Microbiology:  Microbiology Results (last 7 days)     ** No results found for the last 168 hours. **          I have reviewed all pertinent labs within the past 24 hours.    Diagnostic Results:  I have reviewed and interpreted all pertinent imaging results/findings within the past 24 hours.

## 2018-06-25 NOTE — NURSING
Dr. Leos asked that the VAD driveline be assessed when dressing is changed. DLES 1. Driveline site normal skin tone, dry, with small scab present. No drainage from site or on drain gauze. New, sterile dressing applied by wife. Will continue to monitor.

## 2018-06-25 NOTE — H&P
"[facility]  Heart Transplant  H&P    Patient Name: Suman Hayden  MRN: [id number]  Admission Date: 6/25/2018  Attending Physician: Angie Torres MD  Primary Care Provider: Joe Ernst MD  Principal Problem:Tingling in extremities    Subjective:     History of Present Illness:  67 year old man with stage D HFrEF/NICM s/p HM3 @5200, hx of GIB (8/17, 12/17, 1/18, 3/18) s/p ex lap and partial SB resection, [employer] ICD, transferred from  via ambulance for bilateral arm tingling that started yesterday at 5pm. He stated the sensation was sudden in onset and was most likened to "putting your arms in the freezer". The sensation then changed to a hot sensation at some point yesterday evening. He called the on call VAD coordinator and was told he had a bed at [facility] so he was transferred by ambulance. He was recently admitted and discharged on 6/15 for ICD shock thought to be due to aflutter with abberancy as well as low hgb. He was started on amiodarone during his last admission for this with a plan for ablation outpatient. He is currently taking 400mg once a day. Since discharge he said he had been feeling overall well however had some generalized weakness and feeling "not right" which he attributes to the amiodarone. He denies any symptoms of melena or hematochezia at this time.     In the ED his vitals were WNL and his device had no new arrythmias(last interrogation 6/15). His INR was elevated at 4.1.       Past Medical History:   Diagnosis Date    Arthritis     Cardiomyopathy     CHF (congestive heart failure)     Coronary artery disease     Encounter for blood transfusion     Gastric polyp     Hyperlipidemia     Hypertension     Hypotension, iatrogenic     Obesity     S/P implantation of automatic cardioverter/defibrillator (AICD)     Ventricular tachycardia        Past Surgical History:   Procedure Laterality Date    ABDOMINAL SURGERY      CARDIAC CATHETERIZATION      " CARDIAC DEFIBRILLATOR PLACEMENT      CARDIAC DEFIBRILLATOR PLACEMENT      COLONOSCOPY      COLONOSCOPY N/A 3/9/2018    Procedure: COLONOSCOPY;  Surgeon: Andres Chatterjee MD;  Location: [facility] ([address] [place] [address]);  Service: Endoscopy;  Laterality: N/A;    ESOPHAGOGASTRODUODENOSCOPY      LEFT VENTRICULAR ASSIST DEVICE  07/27/2017       Review of patient's allergies indicates:  No Known Allergies    Current Facility-Administered Medications   Medication    amiodarone tablet 400 mg    amLODIPine tablet 5 mg    ciprofloxacin HCl tablet 500 mg    doxycycline tablet 100 mg    dronabinol capsule 5 mg    furosemide tablet 20 mg    HYDROcodone-acetaminophen 5-325 mg per tablet 1 tablet    lisinopril tablet 5 mg    magnesium oxide tablet 400 mg    pantoprazole EC tablet 40 mg    polyethylene glycol packet 17 g    pravastatin tablet 20 mg     Current Outpatient Prescriptions   Medication Sig    amiodarone (PACERONE) 400 MG tablet 400mg (1tab) orally twice daily through 6/22, then 400mg (1tab) daily thereafter.    amLODIPine (NORVASC) 5 MG tablet Take 1 tablet (5 mg total) by mouth once daily. Take one tablet 5mg by mouth once a day.    ciprofloxacin HCl (CIPRO) 500 MG tablet Take 1 tablet (500 mg total) by mouth 2 (two) times daily.    doxycycline (VIBRA-TABS) 100 MG tablet Take 1 tablet (100 mg total) by mouth 2 (two) times daily.    dronabinol (MARINOL) 5 MG capsule Take 1 capsule (5 mg total) by mouth 3 (three) times daily before meals.    furosemide (LASIX) 20 MG tablet Take 1 tablet (20 mg total) by mouth 2 (two) times daily.    HYDROcodone-acetaminophen (NORCO) 5-325 mg per tablet Take 1 tablet by mouth every 6 (six) hours as needed for Pain.    lisinopril (PRINIVIL,ZESTRIL) 5 MG tablet Take 1 tablet (5 mg total) by mouth once daily.    magnesium oxide (MAG-OX) 400 mg tablet Take 1 tablet (400 mg total) by mouth 2 (two) times daily.    pantoprazole (PROTONIX) 40 MG tablet Take 1 tablet (40 mg total) by  mouth once daily.    polyethylene glycol (GLYCOLAX) 17 gram PwPk Take 17 g by mouth daily as needed (constipation).    potassium chloride (MICRO-K) 10 MEQ CpSR Take 2 capsules (20 mEq total) by mouth once daily.    pravastatin (PRAVACHOL) 20 MG tablet Take 1 tablet (20 mg total) by mouth every evening.    warfarin (COUMADIN) 5 MG tablet Take 5mg orally daily, except 2.5mg on Sundays     Family History     Problem Relation (Age of Onset)    Heart disease Mother, Father        Social History Main Topics    Smoking status: Never Smoker    Smokeless tobacco: Never Used    Alcohol use No    Drug use: No    Sexual activity: Not Currently     Review of Systems   Constitutional: Positive for fatigue.   Respiratory: Negative for shortness of breath.    Gastrointestinal: Negative for blood in stool.   Endocrine: Positive for cold intolerance and heat intolerance.   Neurological: Negative for facial asymmetry, speech difficulty, weakness, numbness and headaches.   All other systems reviewed and are negative.    Objective:     Vital Signs (Most Recent):  Temp: 99 °F (37.2 °C) (06/25/18 0141)  Pulse: 80 (06/25/18 0400)  Resp: 16 (06/25/18 0400)  BP: 100/68 (06/25/18 0141)  SpO2: 100 % (06/25/18 0400) Vital Signs (24h Range):  Temp:  [99 °F (37.2 °C)] 99 °F (37.2 °C)  Pulse:  [80] 80  Resp:  [16-20] 16  SpO2:  [99 %-100 %] 100 %  BP: (100)/(68) 100/68     Patient Vitals for the past 72 hrs (Last 3 readings):   Weight   06/25/18 0141 72.1 kg (159 lb)     Body mass index is 23.48 kg/m².    No intake or output data in the 24 hours ending 06/25/18 0524    Physical Exam  GEN: Alert and oriented in NAD  NECK: no JVD appreciated   CVS: + LVAD hum  PULM: CTAB no rales  ABD: NT/ND BS +  Extremities: warm and dry, no edema  NEURO: Alert and oriented x 3  PSYCH: appropriate affect.         Significant Labs:  CBC:    Recent Labs  Lab 06/25/18  0300   WBC 5.72   RBC 3.14*   HGB 7.5*   HCT 24.6*      MCV 78*   MCH 23.9*   MCHC  30.5*     BNP:    Recent Labs  Lab 06/25/18  0300   *     CMP:    Recent Labs  Lab 06/25/18  0300   GLU 91   CALCIUM 9.5   ALBUMIN 3.8   PROT 7.2   *   K 4.2   CO2 22*      BUN 21   CREATININE 1.1   ALKPHOS 58   ALT 13   AST 27   BILITOT 0.4      Coagulation:     Recent Labs  Lab 06/21/18 06/25/18  0300   INR 3.0 4.1*     LDH:  No results for input(s): LDH in the last 72 hours.  Microbiology:  Microbiology Results (last 7 days)     ** No results found for the last 168 hours. **          I have reviewed all pertinent labs within the past 24 hours.    Diagnostic Results:  I have reviewed and interpreted all pertinent imaging results/findings within the past 24 hours.    Assessment/Plan:     * Tingling in extremities    Pt presented with tingling and bilateral hands x 1 day that has not improved, no clinical evidence of any extremity abnormality, no swelling noted and distal pulses intact(ocassionally palpable), of note he has just finished his load of amiodarone now on 400 mg daily and has been on 2 antibiotics for possible DL infection last day is 6/25 (cipro and doxycycline). Unclear etiology at this time (no weakness to or focal deficits to suggest CVA, No hx of diabetes, TSH is normal). 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     Will continue for now but will get an amiodarone level, may need to get EP on board to consider reducing the dose.         Anemia    Chronic GI bleeding, Hb is stable around 7.5.         LVAD (left ventricular assist device) present    Procedure: Device Interrogation Including analysis of device parameters  Current Settings: Ventricular Assist Device  Review of device function is stable/unstable stable    TXP LVAD INTERROGATIONS 6/15/2018 6/15/2018 6/15/2018 6/15/2018 6/14/2018 6/14/2018 6/14/2018   Type HeartMate3 HeartMate3 HeartMate3 HeartMate3 HeartMate3 - -   Flow 3.9 3.9 3.9 4.1 3.9 4.1 4.0    Speed 5200 5200 5200 5200 5200 5200  5200   PI 4.9 4.8 4.5 3.8 3.1 4.0 3.7   Power ([place]) 3.6 3.6 3.5 3.5 3.5 3.5 3.5   LSL - 4800 - - - - -   Pulsatility - - - - - - -     HM3 @ 5200 RPM  INR goal 2-3, on coumadin, supra-therapeutic likely due to amiodarone initiation, will need dose adjustment  Antiplatelets: ASA stopped 12/2017 for GIB/ileal ulcer  MAP goal 60-80, continue home meds  VAD interrogation in AM          AICD (automatic cardioverter/defibrillator) present    No ICD shocks noted and no new arrhythmias. Plan for ablation outpatient(sees Dr. Winchester in [place]). May need to be done sooner than later if the amiodarone is indeed causing his neuropathy.           Essential hypertension    Continue home medications            Rai Krishna MD  Heart Transplant  [facility]

## 2018-06-25 NOTE — ASSESSMENT & PLAN NOTE
HM3 @ 5200 RPM  INR goal 2-3, on coumadin, supra-therapeutic likely due to amiodarone initiation, will need dose adjustment  Antiplatelets: ASA stopped 12/2017 for GIB/ileal ulcer  MAP goal 60-80, continue home meds  VAD interrogation in AM

## 2018-06-25 NOTE — ASSESSMENT & PLAN NOTE
Pt presented with tingling and bilateral hands x 1 day that has not improved, no clinical evidence of any extremity abnormality, no swelling noted and distal pulses intact(ocassionally palpable), of note he has just finished his load of amiodarone now on 400 mg daily and has been on 2 antibiotics for possible DL infection last day is 6/25 (cipro and doxycycline). Unclear etiology at this time (no weakness to or focal deficits to suggest CVA, No hx of diabetes, TSH is normal). 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     Will continue for now but will get an amiodarone level, may need to get EP on board to consider reducing the dose.

## 2018-06-25 NOTE — ED PROVIDER NOTES
Encounter Date: 6/25/2018    SCRIBE #1 NOTE: I, Cyndi Alvarez, am scribing for, and in the presence of,  Dr. Catherine. I have scribed the entire note.       History     Chief Complaint   Patient presents with    Arm Tingling     Arm tingling and SOB earlier tonight.     The patient is a 68 y.o. male with co-morbidities including: HLD, HTN, hypotension, and history of CHF, CAD, cardiomyopathy, who presents to the ED with a complaint of bilateral arm numbness/tingling and SOB that began last night. The patient reports he was sitting down when symptoms appeared, he states his arm felt like they were asleep. He also notes of being SOB. Symptoms lasted for one hour and are all resolved now. Denies headaches or vision changes. The patient is a LVAD patient.         The history is provided by the patient, medical records and the spouse.     Review of patient's allergies indicates:  No Known Allergies  Past Medical History:   Diagnosis Date    Arthritis     Cardiomyopathy     CHF (congestive heart failure)     Coronary artery disease     Encounter for blood transfusion     Gastric polyp     Hyperlipidemia     Hypertension     Hypotension, iatrogenic     Obesity     S/P implantation of automatic cardioverter/defibrillator (AICD)     Ventricular tachycardia      Past Surgical History:   Procedure Laterality Date    ABDOMINAL SURGERY      CARDIAC CATHETERIZATION      CARDIAC DEFIBRILLATOR PLACEMENT      CARDIAC DEFIBRILLATOR PLACEMENT      COLONOSCOPY      COLONOSCOPY N/A 3/9/2018    Procedure: COLONOSCOPY;  Surgeon: Andres Chatterjee MD;  Location: [address]);  Service: Endoscopy;  Laterality: N/A;    ESOPHAGOGASTRODUODENOSCOPY      LEFT VENTRICULAR ASSIST DEVICE  07/27/2017     Family History   Problem Relation Age of Onset    Heart disease Mother     Heart disease Father      Social History   Substance Use Topics    Smoking status: Never Smoker    Smokeless tobacco: Never Used    Alcohol  use No     Review of Systems   Constitutional: Negative for fever.   HENT: Negative for sore throat.    Eyes: Negative for visual disturbance.   Respiratory: Positive for shortness of breath.    Cardiovascular: Negative for chest pain.   Gastrointestinal: Negative for nausea.   Genitourinary: Negative for dysuria.   Musculoskeletal: Negative for back pain.   Skin: Negative for rash.   Neurological: Positive for numbness (Bilateral arms). Negative for weakness and headaches.        (+) Bilateral arm tingling    Hematological: Does not bruise/bleed easily.       Physical Exam     Initial Vitals [06/25/18 0141]   BP Pulse Resp Temp SpO2   100/68 80 20 99 °F (37.2 °C) 99 %      MAP       --         Physical Exam    Nursing note and vitals reviewed.  Constitutional: He appears well-developed and well-nourished. No distress.   HENT:   Head: Normocephalic and atraumatic.   Mouth/Throat: Oropharynx is clear and moist.   Eyes: Conjunctivae are normal.   Neck: Normal range of motion.   Cardiovascular:   Mechanical hum   Pulmonary/Chest: Breath sounds normal. No respiratory distress.   Abdominal: Soft. He exhibits no distension. There is no tenderness.   Drive line in lower left abd   Musculoskeletal: Normal range of motion.   Neurological: He is alert and oriented to person, place, and time.   Skin: Skin is warm and dry.         ED Course   Procedures  Labs Reviewed   CBC W/ AUTO DIFFERENTIAL - Abnormal; Notable for the following:        Result Value    RBC 3.14 (*)     Hemoglobin 7.5 (*)     Hematocrit 24.6 (*)     MCV 78 (*)     MCH 23.9 (*)     MCHC 30.5 (*)     RDW 19.8 (*)     Eosinophil% 8.4 (*)     All other components within normal limits   COMPREHENSIVE METABOLIC PANEL - Abnormal; Notable for the following:     Sodium 133 (*)     CO2 22 (*)     All other components within normal limits   B-TYPE NATRIURETIC PEPTIDE - Abnormal; Notable for the following:      (*)     All other components within normal limits     Narrative:     ADD ON TEST AMIODARONE LEVEL PER DR EVERARDO CATHERINE ORDER #[id number]   06/25/2018  04:10    PROTIME-INR - Abnormal; Notable for the following:     Prothrombin Time 39.5 (*)     INR 4.1 (*)     All other components within normal limits   MAGNESIUM   TSH   PHOSPHORUS   AMIODARONE & METABOLITE   AMIODARONE & METABOLITE          Imaging Results    None          Medical Decision Making:   History:   Old Medical Records: I decided to obtain old medical records.  Clinical Tests:   Lab Tests: Ordered and Reviewed  Other:   I have discussed this case with another health care provider.       <> Summary of the Discussion: Cardiology            Scribe Attestation:   Scribe #1: I performed the above scribed service and the documentation accurately describes the services I performed. I attest to the accuracy of the note.    Attending Attestation:             Attending ED Notes:   4:41 AM  Patient was evaluated by cardiology and will be admitted to the [facility] [place] service.              Clinical Impression:   The encounter diagnosis was Tingling in extremities.      Disposition:   Disposition: Admitted                        Everardo Catherine MD  06/26/18 0031

## 2018-06-25 NOTE — CONSULTS
"[facility]  Cardiology  Consult Note    Patient Name: Suman Hayden  MRN: [id number]  Admission Date: 6/25/2018  Hospital Length of Stay: 0 days  Code Status: Prior   Attending Provider: Tanvi Catherine MD   Consulting Provider: Maurice Ramos MD  Primary Care Physician: Joe Ernst MD  Principal Problem:<principal problem not specified>    Patient information was obtained from patient, spouse/SO and ER records.     Inpatient consult to Cardiology  Consult performed by: MAURICE RAMOS  Consult ordered by: TANVI CATHERINE        Subjective:     Chief Complaint:  Bilateral arm tingling     HPI:   67 year old man with stage D HFrEF/NICM s/p HM3 @5200, hx of GIB (8/17, 12/17, 1/18, 3/18) s/p ex lap and partial SB resection, Medtronic ICD, transferred from  via ambulance for bilateral arm tingling that started yesterday at 5pm. He stated the sensation was sudden in onset and was most likened to "putting your arms in the freezer". The sensation then changed to a hot sensation at some point yesterday evening. He called the on call VAD coordinator and was told he had a bed at [facility] so he was transferred by ambulance. He was recently admitted and discharged on 6/15 for ICD shock thought to be due to aflutter with abberancy as well as low hgb. He was started on amiodarone during his last admission for this with a plan for ablation outpatient. He is currently taking 400mg once a day. Since discharge he said he had been feeling overall well however had some generalized weakness and feeling "not right" which he attributes to the amiodarone. He denies any symptoms of melena or hematochezia at this time.     In the ED his vitals were WNL and his device had no new arrythmias(last interrogation 6/15). His INR was elevated at 4.1.     Past Medical History:   Diagnosis Date    Arthritis     Cardiomyopathy     CHF (congestive heart failure)     Coronary artery disease     Encounter for " blood transfusion     Gastric polyp     Hyperlipidemia     Hypertension     Hypotension, iatrogenic     Obesity     S/P implantation of automatic cardioverter/defibrillator (AICD)     Ventricular tachycardia        Past Surgical History:   Procedure Laterality Date    ABDOMINAL SURGERY      CARDIAC CATHETERIZATION      CARDIAC DEFIBRILLATOR PLACEMENT      CARDIAC DEFIBRILLATOR PLACEMENT      COLONOSCOPY      COLONOSCOPY N/A 3/9/2018    Procedure: COLONOSCOPY;  Surgeon: Andres Chatterjee MD;  Location: [facility] ([address] [place] [address]);  Service: Endoscopy;  Laterality: N/A;    ESOPHAGOGASTRODUODENOSCOPY      LEFT VENTRICULAR ASSIST DEVICE  07/27/2017       Review of patient's allergies indicates:  No Known Allergies    No current facility-administered medications on file prior to encounter.      Current Outpatient Prescriptions on File Prior to Encounter   Medication Sig    amiodarone (PACERONE) 400 MG tablet 400mg (1tab) orally twice daily through 6/22, then 400mg (1tab) daily thereafter.    amLODIPine (NORVASC) 5 MG tablet Take 1 tablet (5 mg total) by mouth once daily. Take one tablet 5mg by mouth once a day.    ciprofloxacin HCl (CIPRO) 500 MG tablet Take 1 tablet (500 mg total) by mouth 2 (two) times daily.    doxycycline (VIBRA-TABS) 100 MG tablet Take 1 tablet (100 mg total) by mouth 2 (two) times daily.    dronabinol (MARINOL) 5 MG capsule Take 1 capsule (5 mg total) by mouth 3 (three) times daily before meals.    furosemide (LASIX) 20 MG tablet Take 1 tablet (20 mg total) by mouth 2 (two) times daily.    HYDROcodone-acetaminophen (NORCO) 5-325 mg per tablet Take 1 tablet by mouth every 6 (six) hours as needed for Pain.    lisinopril (PRINIVIL,ZESTRIL) 5 MG tablet Take 1 tablet (5 mg total) by mouth once daily.    magnesium oxide (MAG-OX) 400 mg tablet Take 1 tablet (400 mg total) by mouth 2 (two) times daily.    pantoprazole (PROTONIX) 40 MG tablet Take 1 tablet (40 mg total) by mouth once daily.     polyethylene glycol (GLYCOLAX) 17 gram PwPk Take 17 g by mouth daily as needed (constipation).    potassium chloride (MICRO-K) 10 MEQ CpSR Take 2 capsules (20 mEq total) by mouth once daily.    pravastatin (PRAVACHOL) 20 MG tablet Take 1 tablet (20 mg total) by mouth every evening.    warfarin (COUMADIN) 5 MG tablet Take 5mg orally daily, except 2.5mg on Sundays     Family History     Problem Relation (Age of Onset)    Heart disease Mother, Father        Social History Main Topics    Smoking status: Never Smoker    Smokeless tobacco: Never Used    Alcohol use No    Drug use: No    Sexual activity: Not Currently     Review of Systems   Constitution: Negative for chills, diaphoresis, fever and weight loss.   HENT: Negative for congestion, hoarse voice and sore throat.    Eyes: Negative for blurred vision, double vision and visual disturbance.   Cardiovascular: Negative for chest pain, claudication, dyspnea on exertion, irregular heartbeat, leg swelling, near-syncope, orthopnea, palpitations, paroxysmal nocturnal dyspnea and syncope.   Respiratory: Negative for cough, hemoptysis, shortness of breath and sleep disturbances due to breathing.    Endocrine: Negative for cold intolerance and heat intolerance.   Hematologic/Lymphatic: Does not bruise/bleed easily.   Gastrointestinal: Negative for abdominal pain, constipation, diarrhea, nausea and vomiting.   Genitourinary: Negative for dysuria.   Neurological: Positive for sensory change. Negative for focal weakness.     Objective:     Vital Signs (Most Recent):  Temp: 99 °F (37.2 °C) (06/25/18 0141)  Pulse: 80 (06/25/18 0400)  Resp: 16 (06/25/18 0400)  BP: 100/68 (06/25/18 0141)  SpO2: 100 % (06/25/18 0400) Vital Signs (24h Range):  Temp:  [99 °F (37.2 °C)] 99 °F (37.2 °C)  Pulse:  [80] 80  Resp:  [16-20] 16  SpO2:  [99 %-100 %] 100 %  BP: (100)/(68) 100/68     Weight: 72.1 kg (159 lb)  Body mass index is 23.48 kg/m².    SpO2: 100 %       No intake or output  data in the 24 hours ending 06/25/18 0451    Lines/Drains/Airways     Line                 VAD 07/27/17 1312 Left ventricular assist device HeartMate 3 332 days                Physical Exam   Constitutional: He is oriented to person, place, and time. He appears well-developed and well-nourished.   HENT:   Head: Normocephalic and atraumatic.   Eyes: EOM are normal. Pupils are equal, round, and reactive to light.   Neck: No JVD present.   Cardiovascular:   Musical LVAD hum noted   Pulmonary/Chest: Effort normal and breath sounds normal. No respiratory distress. He has no wheezes.   Abdominal: Soft. Bowel sounds are normal. He exhibits no distension.   Musculoskeletal: He exhibits no edema.   Neurological: He is alert and oriented to person, place, and time.   Skin: Skin is warm and dry.   Psychiatric: He has a normal mood and affect.       Significant Labs:   Recent Lab Results       06/25/18  0300      Immature Granulocytes 0.2     Immature Grans (Abs) 0.01  Comment:  Mild elevation in immature granulocytes is non specific and   can be seen in a variety of conditions including stress response,   acute inflammation, trauma and pregnancy. Correlation with other   laboratory and clinical findings is essential.       Albumin 3.8     Alkaline Phosphatase 58     ALT 13     Anion Gap 10     AST 27     Baso # 0.03     Basophil% 0.5     Total Bilirubin 0.4  Comment:  For infants and newborns, interpretation of results should be based  on gestational age, weight and in agreement with clinical  observations.  Premature Infant recommended reference ranges:  Up to 24 hours.............<8.0 mg/dL  Up to 48 hours............<12.0 mg/dL  3-5 days..................<15.0 mg/dL  6-29 days.................<15.0 mg/dL         Comment:  Values of less than 100 pg/ml are consistent with non-CHF populations.(H)     BUN, Bld 21     Calcium 9.5     Chloride 101     CO2 22(L)     Creatinine 1.1     Differential Method Automated     eGFR  if  >60.0     eGFR if non  >60.0  Comment:  Calculation used to obtain the estimated glomerular filtration  rate (eGFR) is the CKD-EPI equation.        Eos # 0.5     Eosinophil% 8.4(H)     Glucose 91     Gran # (ANC) 3.0     Gran% 52.8     Hematocrit 24.6(L)     Hemoglobin 7.5(L)     Coumadin Monitoring INR 4.1  Comment:  Coumadin Therapy:  2.0 - 3.0 for INR for all indicators except mechanical heart valves  and antiphospholipid syndromes which should use 2.5 - 3.5.  (H)     Lymph # 1.7     Lymph% 29.2     Magnesium 2.3     MCH 23.9(L)     MCHC 30.5(L)     MCV 78(L)     Mono # 0.5     Mono% 8.9     MPV 11.1     nRBC 0     Phosphorus 3.0     Platelets 189     Potassium 4.2     Total Protein 7.2     Protime 39.5(H)     RBC 3.14(L)     RDW 19.8(H)     Sodium 133(L)     TSH 3.346     WBC 5.72           Significant Imaging: Echocardiogram:   2D echo with color flow doppler:   Results for orders placed or performed in visit on 05/15/18   2D Echo w/ Color Flow Doppler   Result Value Ref Range    EF 20 (A) 55 - 65    Est. PA Systolic Pressure 24.48     Mitral Valve Mobility NORMAL     Tricuspid Valve Regurgitation TRIVIAL      Assessment and Plan:     Tingling in extremities    Unclear etiology, no clinical evidence of any extremity abnormality, no swelling noted and distal pulses intact(ocassionally palpable)  Electrolytes are WNL          Anemia    Stable from discharge although still below his baseline prior to last admission        LVAD (left ventricular assist device) present    HM3 @ 5200 RPM  INR goal 2-3, on coumadin, supra-therapeutic likely due to amiodarone initiation, will need dose adjustment  Antiplatelets: ASA stopped 12/2017 for GIB/ileal ulcer  MAP goal 60-80, continue home meds  VAD interrogation in AM        AICD (automatic cardioverter/defibrillator) present    No ICD shocks noted and no new arrhythmias   Plan for ablation outpatient(sees Dr. Winchester in [place])             VTE Risk Mitigation     None        Will admit to obs, discussed with HTS Staff Dr. Torres, HTS fellow notified    Thank you for your consult.    Maurice Ramos DO  Cardiology Fellow    Cardiology   [facility]

## 2018-06-25 NOTE — PROGRESS NOTES
Pt arrived via stretcher from ED with cardiac monitoring, transferred by RNx1. NAD noted. LVAD equipment @bedside, inventory completed. NAD noted. Pt states that all symptoms, including SOB/numbness/tingling/coldness to BUE, have resolved. VS stable. VAD numbers WNL. Orders in epic. Wife and pt oriented to room. Bed low and locked, call light and personal belongings within reach, will continue to monitor. Ryanne Rubio RN

## 2018-06-25 NOTE — PROGRESS NOTES
Admit Note/Discharge Note     Met with patient and spouse to assess needs. Patient is a 68 y.o.  male, admitted for tingling in extremities. Pt received a LVAD 7-27-17. Pt was last Discharged from [facility] on June 15th 2018. Pt's spouse does the LVAD dressing changes.     Patient admitted from emergency on 6/25/2018 .  At this time, patient presents as alert and oriented x 4, pleasant, calm and communicative.  At this time, patients caregiver presents as alert and oriented x 4, pleasant, calm and communicative.    Household/Family Systems     Patient and spouse resides with patient's daughter and son in law at    [address], [address]  [place] [address]  Pt's spouse reports they may be able to move back into their own home in about a week.     Pt's home address:   [address] [place]  [place] [address].      Support system includes spouse and extended family.    Patient does not have dependents that are need of being cared for.     Patients primary caregiver is Kia Hayden, patients spouse, phone number [phone].    Home phone has been turned back on : [phone]  Additional emergency contacts:  Rayna (daughter) [phone]  Malcolm (son in law) [phone]  Pt's spouse reports her son in law is the  in [place].  Boni Hayden (son) [phone]  Artie Hayden (nephew) [phone]    During admission, patient's caregiver plans to stay in patient's room.  Confirmed patient and patients caregivers do have access to reliable transportation.    Cognitive Status/Learning     Patient reports reading ability as 12th grade and states patient does not have difficulty with reading, writing, seeing, hearing, comprehension, learning and memory.  Patient reports patient learns best by visual.     Needed: No.   Highest education level: High School (9-12) or GED    Vocation/Disability   .  Working for Income: No  If no, reason not working: Patient Choice - Retired  Patient  retired in  from the [employer].  Pt was a .    Pt's spouse worked at [employer] for many years and was working as a PCA until the pt became ill.    Pt's spouse reports pt receives $912.00 a month.  Pt's spouse reports she does have the information needed to contact Medicaid and apply for same over the phone.  Pt also reports she also has the necessary information to apply for food stamps.  Pt's spouse reports she hopes to start receiving social security in 2018.        Adherence     Patient reports a high level of adherence to patients health care regimen.  Adherence counseling and education provided. Patient verbalizes understanding.    Substance Use    Patient reports the following substance usage.    Tobacco: none, patient denies any use.  Alcohol: none, patient denies any use.  Illicit Drugs/Non-prescribed Medications: none, patient denies any use.  Patient states clear understanding of the potential impact of substance use.  Substance abstinence/cessation counseling, education and resources provided and reviewed.     Services Utilizing/ADLS    Infusion Service: Prior to admission, patient utilizing? no pt has used [facility] in the past  Home Health: Prior to admission, patient utilizing? yes [facility] once a week for lab work. [phone], fax [phone].  DME: Prior to admission, yes walker, wheelchair and bedside commode. Pt reports he does not use the commode.   Pulmonary/Cardiac Rehab: Prior to admission, no  Dialysis:  Prior to admission, no  Transplant Specialty Pharmacy:  Prior to admission, no.    Prior to admission, patient reports patient was independent with ADLS and was not driving. Pt's spouse and other family members drive.  Patient reports patient is able to care for self at this time..  Patient indicates a willingness to care for self once medically cleared to do so.    Insurance/Medications    Insured by   Payor/Plan Subscr  Sex Relation Sub. Ins. ID  Effective Group Num   1. [place]ICARE - [place]* MIRTA LOPEZ 1950 Male  [id number] 6/1/15                                    [address]   2. BLUE CROSS BL* MIRTA LOPEZ 1950 Male  [id number] 1/1/10 [id number]                                   [address] [address]      Primary Insurance (for UNOS reporting): Public Insurance - Medicare FFS (Fee For Service)  Secondary Insurance (for UNOS reporting): Private Insurance    Patient reports patient is able to obtain and afford medications at this time and at time of discharge.    Living Will/Healthcare Power of     Patient states patient does not have a LW and/or HCPA.   provided education regarding LW and HCPA and the completion of forms.    Coping/Mental Health    Patient is coping adequately with the aid of  family members and friends.   Patient denies mental health difficulties. Worker provided general support.     Discharge Planning    At time of discharge, patient plans to return to patient's home under the care of spouse.  Patients family will transport patient.  Per rounds today, expected discharge date is possibly today. Patient and patients caregiver  verbalize understanding and are involved in treatment planning and discharge process.    Additional Concerns    Patient's caretaker denies additional needs and/or concerns at this time. Patient is being followed for needs, education, resources, information, emotional support, supportive counseling, and for supportive and skilled discharge plan of care.  providing ongoing psychosocial support, education, resources and d/c planning as needed.  SW remains available. Patient's caregiver verbalizes understanding and agreement with information reviewed,  availability and how to access available resources as needed. Patient denies additional needs and/or concerns at this time. Patient verbalizes understanding and agreement with information reviewed, social work  availability, and how to access available resources as needed.

## 2018-06-26 NOTE — PROGRESS NOTES
Mr. Hayden will be discharged today following admission for bilateral arm tingling.  PMH includes HM3 lvad, recurrent GI bleeding with recent SB resection for an ileal ulcer,  staph epi bacteremia, DLI, and recent ICD shock for AFL with abberancy.  During hospital admit, s/sx completely resolved without intervention.  Warfarin was adjusted and decreased due to recent amiodarone initiation and supratherapeutic INR.  Antibiotics for DLI were completed.  New warfarin dose is 2.5mg/day, and 5mg M & F.  INR goal remains 2-3 requiring a bridge, without ASA.  Medication card was reviewed with the patient.       Suman Hayden   Home Medication Instructions MYRANDA:[id number]    Printed on:06/26/18 1110   Medication Information                      amiodarone (PACERONE) 400 MG tablet  Take 1 tablet (400 mg total) by mouth once daily.             amLODIPine (NORVASC) 5 MG tablet  Take 1 tablet (5 mg total) by mouth once daily.             dronabinol (MARINOL) 5 MG capsule  Take 1 capsule (5 mg total) by mouth 3 (three) times daily before meals.             furosemide (LASIX) 20 MG tablet  Take 1 tablet (20 mg total) by mouth 2 (two) times daily.             HYDROcodone-acetaminophen (NORCO) 5-325 mg per tablet  Take 1 tablet by mouth every 6 (six) hours as needed for Pain.             lisinopril (PRINIVIL,ZESTRIL) 5 MG tablet  Take 1 tablet (5 mg total) by mouth once daily.             magnesium oxide (MAG-OX) 400 mg tablet  Take 1 tablet (400 mg total) by mouth 2 (two) times daily.             pantoprazole (PROTONIX) 40 MG tablet  Take 1 tablet (40 mg total) by mouth once daily.             polyethylene glycol (GLYCOLAX) 17 gram PwPk  Take 17 g by mouth daily as needed (constipation).             pravastatin (PRAVACHOL) 20 MG tablet  Take 1 tablet (20 mg total) by mouth every evening.             warfarin (COUMADIN) 5 MG tablet  Take 2.5mg orally daily, except 5mg on M&F

## 2018-06-26 NOTE — NURSING
D/C instructions and AVS given to and reviewed with patient and wife;  VU.  IV access removed, no redness or swelling noted.  Awaiting pt escort.

## 2018-06-26 NOTE — ASSESSMENT & PLAN NOTE
Procedure: Device Interrogation Including analysis of device parameters  Current Settings: Ventricular Assist Device  Review of device function is stable/unstable stable    TXP LVAD INTERROGATIONS 6/26/2018 6/25/2018 6/25/2018 6/25/2018 6/25/2018 6/25/2018 6/25/2018   Type HeartMate3 HeartMate3 HeartMate3 HeartMate3 HeartMate3 HeartMate3 HeartMate3   Flow 4.3 4.1 4.0 4.2 4.3 4.0 4.1   Speed 5200 5200 5200 5200 5200 5200 5200   PI 3.6 3.7 3.9 3.0 3.8 3.7 3.6   Power ([place]) 3.5 3.7 3.6 3.5 3.5 3.5 3.5   LSL 4800 4800 4800 - - - -   Pulsatility - - - - - - -     HM3 @ 5200 RPM  INR goal 2-3, on coumadin, (held 6/25) supra-therapeutic likely due to amiodarone initiation, will need dose adjustment  Antiplatelets: ASA stopped 12/2017 for GIB/ileal ulcer  MAP goal 60-80, continue home meds  VAD interrogation in AM

## 2018-06-26 NOTE — ASSESSMENT & PLAN NOTE
Pt presented with tingling and bilateral hands x 1 day that has not improved, no clinical evidence of any extremity abnormality, no swelling noted and distal pulses intact(ocassionally palpable), of note he has just finished his load of amiodarone now on 400 mg daily and has been on 2 antibiotics for possible DL infection last day is 6/25 (cipro and doxycycline). Unclear etiology at this time (no weakness to or focal deficits to suggest CVA, No hx of diabetes, TSH is normal). 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   - amio level still in process  - tingling has completely resolved. Will follow up with EP for ablation and possible cessation of amiodarone

## 2018-06-26 NOTE — DISCHARGE SUMMARY
"[facility]  Heart Transplant  Discharge Summary      Patient Name: Suman Hayden  MRN: [id number]  Admission Date: 6/25/2018  Hospital Length of Stay: 0 days  Discharge Date and Time: 06/26/2018 10:28 AM  Attending Physician: Angie Torres MD   Discharging Provider: Isabel Chen PA-C  Primary Care Provider: Joe Ernst MD     HPI: 67 year old man with stage D HFrEF/NICM s/p HM3 @5200, hx of GIB (8/17, 12/17, 1/18, 3/18) s/p ex lap and partial SB resection, Medtronic ICD, transferred from  via ambulance for bilateral arm tingling that started yesterday at 5pm. He stated the sensation was sudden in onset and was most likened to "putting your arms in the freezer". The sensation then changed to a hot sensation at some point yesterday evening. He called the on call VAD coordinator and was told he had a bed at [facility] so he was transferred by ambulance. He was recently admitted and discharged on 6/15 for ICD shock thought to be due to aflutter with abberancy as well as low hgb. He was started on amiodarone during his last admission for this with a plan for ablation outpatient. He is currently taking 400mg once a day. Since discharge he said he had been feeling overall well however had some generalized weakness and feeling "not right" which he attributes to the amiodarone. He denies any symptoms of melena or hematochezia at this time.      In the ED his vitals were WNL and his device had no new arrythmias(last interrogation 6/15). His INR was elevated at 4.1.     * No surgery found *     Hospital Course: Patient was admitted to [facility] for work up bilateral UE paresthesias. On 5/25 patients symptoms had completely resolved. No focal neuro deficits on admission or throughout hospital stay. Abx for DLES infection (no cultures, treated empirically in Dr Ambrosio's clinic with cipro and doxy) were discontinued as he had completed a 2 week course. INR supra-therapeutic on admission, coumadin was " held on 6/25. Patient was discharged on 6/26 in good health with HTS follow up and awaiting appointment scheduling for [facility]que- [facility] for AF ablation.    Consults         Status Ordering Provider     Inpatient consult to Cardiology  Once     Provider:  (Not yet assigned)    Completed EVERARDO SANDHU        Significant Diagnostic Studies: Labs:   CMP   Recent Labs  Lab 06/25/18  0300 06/26/18  0421   * 135*   K 4.2 4.2    102   CO2 22* 25   GLU 91 71   BUN 21 15   CREATININE 1.1 1.1   CALCIUM 9.5 9.5   PROT 7.2  --    ALBUMIN 3.8  --    BILITOT 0.4  --    ALKPHOS 58  --    AST 27  --    ALT 13  --    ANIONGAP 10 8   ESTGFRAFRICA >60.0 >60.0   EGFRNONAA >60.0 >60.0    and INR   Lab Results   Component Value Date    INR 3.5 (H) 06/26/2018    INR 4.1 (H) 06/25/2018    INR 3.0 06/21/2018     Pending Diagnostic Studies:     Procedure Component Value Units Date/Time    Amiodarone level [[id number]] Collected:  06/25/18 0528    Order Status:  Sent Lab Status:  In process Updated:  06/25/18 0532    Specimen:  Blood from Blood         Final Active Diagnoses:    Diagnosis Date Noted POA    PRINCIPAL PROBLEM:  Tingling in extremities [R20.2] 06/25/2018 Unknown    LVAD (left ventricular assist device) present [Z95.811] 07/29/2017 Not Applicable    Anemia [D64.9] 12/08/2017 Yes    AICD (automatic cardioverter/defibrillator) present [Z95.810] 08/06/2017 Yes    Essential hypertension [I10] 07/05/2017 Yes      Problems Resolved During this Admission:    Diagnosis Date Noted Date Resolved POA      Discharged Condition: stable    Disposition: home    Follow Up: As scheduled with HTS, 2-4 weeks with Jacque-Jennifer (message sent to schedulers)    Patient Instructions:     Diet Cardiac     Notify your health care provider if you experience any of the following:  increased confusion or weakness     Notify your health care provider if you experience any of the following:  persistent dizziness, light-headedness, or visual  disturbances     Notify your health care provider if you experience any of the following:  worsening rash     Notify your health care provider if you experience any of the following:  severe persistent headache     Notify your health care provider if you experience any of the following:  difficulty breathing or increased cough     Notify your health care provider if you experience any of the following:  redness, tenderness, or signs of infection (pain, swelling, redness, odor or green/yellow discharge around incision site)     Notify your health care provider if you experience any of the following:  temperature >100.4     Notify your health care provider if you experience any of the following:  persistent nausea and vomiting or diarrhea     Notify your health care provider if you experience any of the following:  severe uncontrolled pain     Activity as tolerated       Medications:  Reconciled Home Medications:      Medication List      CHANGE how you take these medications    amiodarone 400 MG tablet  Commonly known as:  PACERONE  Take 1 tablet (400 mg total) by mouth once daily.  What changed:  · how much to take  · how to take this  · when to take this  · additional instructions     amLODIPine 5 MG tablet  Commonly known as:  NORVASC  Take 1 tablet (5 mg total) by mouth once daily.  What changed:  additional instructions     warfarin 5 MG tablet  Commonly known as:  COUMADIN  Take 2.5mg orally daily, except 5mg on M&F  What changed:  additional instructions        CONTINUE taking these medications    dronabinol 5 MG capsule  Commonly known as:  MARINOL  Take 1 capsule (5 mg total) by mouth 3 (three) times daily before meals.     furosemide 20 MG tablet  Commonly known as:  LASIX  Take 1 tablet (20 mg total) by mouth 2 (two) times daily.     HYDROcodone-acetaminophen 5-325 mg per tablet  Commonly known as:  NORCO  Take 1 tablet by mouth every 6 (six) hours as needed for Pain.     lisinopril 5 MG tablet  Commonly  known as:  PRINIVIL,ZESTRIL  Take 1 tablet (5 mg total) by mouth once daily.     magnesium oxide 400 mg tablet  Commonly known as:  MAG-OX  Take 1 tablet (400 mg total) by mouth 2 (two) times daily.     pantoprazole 40 MG tablet  Commonly known as:  PROTONIX  Take 1 tablet (40 mg total) by mouth once daily.     polyethylene glycol 17 gram Pwpk  Commonly known as:  GLYCOLAX  Take 17 g by mouth daily as needed (constipation).     pravastatin 20 MG tablet  Commonly known as:  PRAVACHOL  Take 1 tablet (20 mg total) by mouth every evening.        STOP taking these medications    ciprofloxacin HCl 500 MG tablet  Commonly known as:  CIPRO     doxycycline 100 MG tablet  Commonly known as:  VIBRA-TABS     potassium chloride 10 MEQ Cpsr  Commonly known as:  MICRO-K            Isabel Chen PA-C  Heart Transplant  [facility]

## 2018-06-26 NOTE — PROGRESS NOTES
Discharge note:   to see pt and spouse given pt is being discharged to home today.  The pt's spouse reports she applied for Medicaid today via phone.  The pt's understands need to have pt's caregiver and backup caregiver in attendance for psychosocial.    Margy from [facility] has been contacted and no additional orders are required given the pt was in observation.    The pt and spouse are in agreement with discharge plan and report no additional needs.   General support provided.

## 2018-06-26 NOTE — ASSESSMENT & PLAN NOTE
- No ICD shocks noted and no new arrhythmias. Plan for ablation outpatient (sees Dr. Winchester in [place]). May need to be done sooner than later if the amiodarone is indeed causing his neuropathy. Emailed  for appointment later this month with Annabella regarding ablation.

## 2018-06-26 NOTE — PLAN OF CARE
Problem: Patient Care Overview  Goal: Plan of Care Review  Outcome: Ongoing (interventions implemented as appropriate)  Plan of care discussed with patient.  Patient ambulating independently, fall precautions in place.Continuing to encourage ambulation. LVAD DP and numbers WNL, smooth LVAD hum. Patient has no complaints of pain. PT had complaints of itching PRN benadryl given. Pt INR 4.1 held coumadin today. Pt stated had no tingling nor numbness throughout the day. Discussed medications and care. Patient has no questions at this time. Will continue to monitor.

## 2018-06-26 NOTE — SUBJECTIVE & OBJECTIVE
Interval History: No complaints overnight. Bilateral arm tingling has completely resolved. Wants to go home. Pharmacy will discuss coumadin dose with patient as he is still supratherapeutic.     Continuous Infusions:  Scheduled Meds:   amiodarone  400 mg Oral Daily    amLODIPine  5 mg Oral Daily    dronabinol  5 mg Oral TID AC    furosemide  20 mg Oral BID    lisinopril  5 mg Oral Daily    magnesium oxide  400 mg Oral BID    pantoprazole  40 mg Oral Daily    pravastatin  20 mg Oral QHS     PRN Meds:diphenhydrAMINE, HYDROcodone-acetaminophen, polyethylene glycol    Review of patient's allergies indicates:  No Known Allergies  Objective:     Vital Signs (Most Recent):  Temp: 98.3 °F (36.8 °C) (06/26/18 0901)  Pulse: 80 (06/26/18 0901)  Resp: 16 (06/26/18 0901)  BP: (!) 76/0 (06/26/18 0901)  SpO2: 98 % (06/26/18 0901) Vital Signs (24h Range):  Temp:  [97.4 °F (36.3 °C)-98.6 °F (37 °C)] 98.3 °F (36.8 °C)  Pulse:  [78-85] 80  Resp:  [16-18] 16  SpO2:  [97 %-98 %] 98 %  BP: (70-88)/(0-64) 76/0     Patient Vitals for the past 72 hrs (Last 3 readings):   Weight   06/25/18 0556 73 kg (160 lb 15 oz)   06/25/18 0141 72.1 kg (159 lb)     Body mass index is 23.77 kg/m².      Intake/Output Summary (Last 24 hours) at 06/26/18 1018  Last data filed at 06/26/18 0900   Gross per 24 hour   Intake             1500 ml   Output             2775 ml   Net            -1275 ml       Hemodynamic Parameters:         Physical Exam   Constitutional: He appears well-developed and well-nourished.   HENT:   Head: Normocephalic and atraumatic.   Neck: Normal range of motion. No JVD present.   Cardiovascular: Normal rate and regular rhythm.    VAD hum smooth, non-pulsitile    Pulmonary/Chest: Effort normal and breath sounds normal. No respiratory distress.   Abdominal: Soft. Bowel sounds are normal.   Musculoskeletal: He exhibits no edema.   Skin: Skin is warm and dry. Capillary refill takes 2 to 3 seconds.   Psychiatric: He has a normal mood  and affect. His behavior is normal. Judgment and thought content normal.   Nursing note and vitals reviewed.      Significant Labs:  CBC:    Recent Labs  Lab 06/25/18 0300 06/26/18 0421   WBC 5.72 4.47   RBC 3.14* 3.35*   HGB 7.5* 7.9*   HCT 24.6* 26.0*    187   MCV 78* 78*   MCH 23.9* 23.6*   MCHC 30.5* 30.4*     BNP:    Recent Labs  Lab 06/25/18 0300   *     CMP:    Recent Labs  Lab 06/25/18 0300 06/26/18 0421   GLU 91 71   CALCIUM 9.5 9.5   ALBUMIN 3.8  --    PROT 7.2  --    * 135*   K 4.2 4.2   CO2 22* 25    102   BUN 21 15   CREATININE 1.1 1.1   ALKPHOS 58  --    ALT 13  --    AST 27  --    BILITOT 0.4  --       Coagulation:     Recent Labs  Lab 06/21/18 06/25/18 0300 06/26/18 0421   INR 3.0 4.1* 3.5*   APTT  --   --  34.9*     LDH:    Recent Labs  Lab 06/26/18 0421        Microbiology:  Microbiology Results (last 7 days)     ** No results found for the last 168 hours. **          I have reviewed all pertinent labs within the past 24 hours.    Estimated Creatinine Clearance: 64.3 mL/min (based on SCr of 1.1 mg/dL).    Diagnostic Results:  I have reviewed all pertinent imaging results/findings within the past 24 hours.

## 2018-06-27 NOTE — PROGRESS NOTES
Mercedes with [facility]  called to report the Patient was discharged from the hospital -6/26, inquiring as to when to draw the INR, nurse will be seeing the Patient today, asked Mercedes to go ahead and get the INR today, order was faxed to home health

## 2018-06-29 NOTE — PROGRESS NOTES
Wife confirmed dose. Wife reports patient was very dehydrated after discharged 3/27.  Will continue boost drinks 3 x daily . No other changes
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
## 2018-07-12 NOTE — PROGRESS NOTES
PSYCHOSOCIAL UPDATE FROM MAY 9, 2018    SW met with pt and wife today in clinic. Pt has applied for Medicaid and they are waiting to hear if he has been approved for coverage.     Pt's wife is turning 65 in October and is talking with social security about getting her benefits early, per her report, and if she is able to get it early she will soon be getting around $800/month in social security, which will greatly help with their financial situation.    Pt wife stated they do not need to sell one of their cars now due to the above.    Pt and wife stated that he will keep his supplemental BCBS as it is through his employer and is paid by his employer. SW to ask  to meet with pt and SW at next visit.    Pt and wife stated they are working on applying for SNAP (food stamp program).    Pt and wife stated their daughter was unable to come bc her sister in laws sister passed away and she had to go to the  in [place]. They also state they have 4 children and at least two will come to next appointment. Pt and wife aware we must meet a backup caregiver and verbalized understanding.    Psychosocial Suitable: PT remains suitable pending the above discussed items are resolved. Pt has had ongoing issues becoming suitable for transplant.

## 2018-07-12 NOTE — PROCEDURES
TXP [facility] INTERROGATIONS 7/12/2018 6/14/2018 6/11/2018 5/9/2018 4/11/2018 3/21/2018 3/21/2018   Type HeartMate3 - - HeartMate3 HeartMate3 - -   Flow 3.9 - 4.1 4.0 3.8 - -   Speed 5200 - 5200 5200 5200 - -   PI 3.6 - 4.0 4.1 5.4 - -   Power ([place]) 3.6 - 3.6 3.7 3.5 - -   LSL 4800 - 4800 4800 4800 - -   Pulsatility Intermittent pulse Pulse Pulse Pulse No Pulse Intermittent pulse Intermittent pulse   }  Interrogation of Ventricular assist device was performed with physician analysis of device parameters and review of device function. I have personally reviewed the interrogation findings and agree with findings as stated.

## 2018-07-12 NOTE — PROGRESS NOTES
Subjective:   Patient ID:  Suman Hayden is a 68 y.o. male who presents for LVAD followup visit.    Implant Date:7/27/17  Initials:HJB     HM 3 RPM: 5200  [place]um [place] [id number]     INR goal: 2-3   Bridge with Heparin   Antiplatelets: ASA stopped on 12/22/17 after GI bleed and ileal ulcer Implant Date:7/27/17  Initials:HJB     HM 3 RPM: 5200  Momentum ID [id number]     INR goal: 2-3   Bridge with Heparin   Antiplatelets: ASA stopped on 12/22/17 after GI bleed and ileal ulcer     Interrogation of Ventricular assist device was performed with physician analysis of device parameters and review of device function. I have personally reviewed the interrogation findings and agree with findings as stated.     TXP SHERRI INTERROGATIONS 7/12/2018   Type HeartMate3   Flow 3.9   Speed 5200   PI 3.6   Power ([place]) 3.6   LSL 4800   Pulsatility Intermittent pulse   Interrogation of Ventricular assist device was performed with physician analysis of device parameters and review of device function. I have personally reviewed the interrogation findings and agree with findings as stated.       HPI  68 BM with chronic systolic heart failure secondary to non ischemic cardiomyopathy underwent Heartmate  3 LVAD implanted as DT therapy 7/27/17. He underwent ICD revision on 11/20/17 with Dr. Vidal (DC ICD placed with active RA/LV leads (RV lead capped during revision) that was complicated by pocket hematoma. In January 2018 he developed staph epid bacteremia.  Post-op course further complicated by GI bleeding due to ilieal bleed that was APC'ed using double balloon enteroscopy by Dr. Chatterjee Dec 2017. His aspirin was stopped 12/22/17 and PPI increased to BID . He was re-admitted 1/25/17 where he had a double balloon enteroscopy without active bleed, but with ulceration, and subsequent retrograde double balloon enteroscopy on 1/26/18 which was unrevealing. Due to recurrent bleeding in March 2018 he underwent partial small bowel resection on  3/13/2018.  Since that time he has done well without recurrent bleeding.    He was admitted June 2018 for ICD shock thought to be due to aflutter with abberrancy, treated with amiodarone and is to see Dr. Winchester in  tomorrow for consideration of other treatment options per wife.    He has been feeling great, getting out more, more active with improved strength though still using cart to get around grocery.    No tightness, pressure or heaviness in chest, neck, arms, throat, jaw or back with or without exertion.  No PEREIRA, orthopnea, PND, palpitations, pre-syncope or syncope.    He is not listed for transplant but would like to be considered.    Review of Systems   Constitution: Positive for weakness (improving). Negative for chills, fever, malaise/fatigue, night sweats, weight gain and weight loss.   HENT: Positive for congestion (nasal) and hearing loss (old). Negative for hoarse voice, nosebleeds and sore throat.    Eyes: Negative for double vision, pain, vision loss in left eye and vision loss in right eye.   Cardiovascular: Positive for dyspnea on exertion. Negative for chest pain, claudication, irregular heartbeat, leg swelling, near-syncope, orthopnea, palpitations, paroxysmal nocturnal dyspnea and syncope.   Respiratory: Negative for cough, hemoptysis, shortness of breath, sleep disturbances due to breathing, snoring, sputum production and wheezing.    Endocrine: Negative for cold intolerance, heat intolerance, polydipsia and polyuria.   Hematologic/Lymphatic: Negative for bleeding problem. Does not bruise/bleed easily.   Musculoskeletal: Negative for falls, muscle cramps, myalgias and neck pain.   Gastrointestinal: Positive for heartburn. Negative for bloating, abdominal pain, change in bowel habit, constipation, diarrhea, hematochezia, jaundice, melena and nausea.   Genitourinary: Negative for bladder incontinence, dysuria, frequency, hematuria, hesitancy, incomplete emptying and urgency.   Neurological:  "Negative for brief paralysis, dizziness, focal weakness, headaches, numbness, paresthesias and seizures.   Psychiatric/Behavioral: Negative for depression and memory loss. The patient is not nervous/anxious.      Objective:   Blood pressure (!) 81/0, temperature 98.7 °F (37.1 °C), temperature source Oral, height 5' 9" (1.753 m), weight 75.3 kg (166 lb).body mass index is 24.51 kg/m².  Doppler:  81 mm Hg  Physical Exam   Constitutional: He appears well-developed and well-nourished. No distress.   BP (!) 81/0 (BP Location: Left arm, Patient Position: Sitting, BP Method: Medium (Manual)) Comment: doppler  Temp 98.7 °F (37.1 °C) (Oral)   Ht 5' 9" (1.753 m)   Wt 75.3 kg (166 lb)   BMI 24.51 kg/m²    HENT:   Head: Normocephalic and atraumatic.   Eyes: Conjunctivae are normal. No scleral icterus.   Neck: No JVD present. No thyromegaly present.   Cardiovascular:   Normal VAD sounds; DL 1   Pulmonary/Chest: Effort normal and breath sounds normal. No respiratory distress. He has no wheezes. He has no rales.   Abdominal: Soft. Bowel sounds are normal. He exhibits no distension and no mass. There is no tenderness. There is no rebound and no guarding.   Musculoskeletal: He exhibits no edema or tenderness.   Neurological: He is alert.   Skin: Skin is warm and dry. He is not diaphoretic.   Psychiatric: He has a normal mood and affect. His behavior is normal. Judgment and thought content normal.     Lab Results   Component Value Date     (H) 07/12/2018     07/12/2018     01/15/2018    K 3.2 (L) 07/12/2018    K 3.8 01/15/2018    MG 2.3 07/12/2018     07/12/2018     01/15/2018    CO2 24 07/12/2018    CO2 25 01/15/2018    BUN 22 07/12/2018    BUN 18 01/15/2018    CREATININE 1.2 07/12/2018    CREATININE 0.9 01/15/2018     07/12/2018    HGBA1C 4.7 05/09/2018    AST 23 07/12/2018    AST 17 01/15/2018    ALT 21 07/12/2018    ALT 14 01/15/2018    ALBUMIN 4.1 07/12/2018    ALBUMIN 2.8 01/15/2018    " PROT 7.8 07/12/2018    BILITOT 0.5 07/12/2018    WBC 4.64 07/12/2018    WBC 3.9 01/15/2018    HGB 8.0 (L) 07/12/2018    HCT 27.3 (L) 07/12/2018    HCT 29 (L) 08/09/2017     07/12/2018    INR 2.5 (H) 07/12/2018    INR 2.2 07/02/2018    INR 1.4 01/02/2018     07/12/2018    TSH 3.346 06/25/2018    CHOL 149 07/12/2018    HDL 60 02/19/2018    LDLCALC 55.6 (L) 02/19/2018    TRIG 62 02/19/2018       Assessment:      1. Heart replaced by heart assist device    2. Diuretic-induced hypokalemia    3. Chronic combined systolic and diastolic congestive heart failure    4. Essential hypertension    5. Nonischemic cardiomyopathy    6. Anticoagulation monitoring, INR range 2-3    7. Paroxysmal atrial fibrillation        Plan:   Diuresis, wt loss, low sodium diet and fluid restriction reviewed along with daily weights and how to respond to 3# weight gain with prn diuretics.  If this fails to restore dry weight call us within 2-3 days.   Add spironolactone 25 mg qd to regimen and KCl 20 meq daily  I don't see fluid so despite BNP will not be more aggressive with loop diuretic today  KCL 50 meq today for K 3.2  Obtain BMP Monday--may have to reduce KCL depending upon his response to aldactone  Continue to work on activity    Patient is now NYHA II    Listed for transplant: No    UNOS Patient Status  Functional Status: 80% - Normal activity with effort: some symptoms of disease  Physical Capacity: Limited Mobility from weakness but improving and not neuro or ortho restriction  Working for Income: No  If no, reason not working: Patient Choice - Retired

## 2018-07-12 NOTE — PROGRESS NOTES
Study: Momentum 3 CAP  Sponsor: [employer]  Follow-up Visit: 12 month  Date of Visit: [id number]     Patient wishes to continue in study: Yes  All study protocol required CRFs completed: Yes     Mr. Hayden is here for the 12 month follow up visit. Current list of medications obtained and verified; he reports that he has been admitted 3 times since previous study follow-up. All questions answered to his satisfaction and he will contact research staff if he has any questions or concerns. Next follow up visit is in 6 months.      The following tests and procedures are related to research study:  Labs, Echo, Chest x ray, VAD clinic visit

## 2018-07-12 NOTE — PROGRESS NOTES
Subjective:      Patient ID: Suman Hayden is a 68 y.o. male.    Chief Complaint:Follow-up      History of Present Illness    68 year old with chronic systolic heart failure secondary to non-ischemic cardiomyopathy - HMIII - DT therapy implanted 7/27/2017. He underwent revision on 11/20/17; complicated with pocket hematoma January 2018 - Staph epi bacteremia.  Most recently with drainage and was given doxy and cipro (6/11).  Now doing well.      Review of Systems   Constitution: Negative for fever.   All other systems reviewed and are negative.    Objective:   Physical Exam   Constitutional: He is oriented to person, place, and time. He appears well-developed.   Cardiovascular: Normal rate.    VAD humming   Neurological: He is alert and oriented to person, place, and time.   Vitals reviewed.                 Assessment:       1. LVAD (left ventricular assist device) present    2. Nonischemic cardiomyopathy          Plan:       1. No further antibiotics indicated at this time.   2. Follow up in one month.

## 2018-07-12 NOTE — LETTER
July 12, 2018        Joe Ernst  [address] [facility] [address]  Phone: [phone]  Fax: [phone]             [facility] [address] [place] [address]  Phone: [phone]   Patient: Suman Hayden   MR Number: [id number]   YOB: 1950   Date of Visit: 7/12/2018       Dear Dr. Joe Ernst    Thank you for referring Suman Hayden to me for evaluation. Attached you will find relevant portions of my assessment and plan of care.    If you have questions, please do not hesitate to call me. I look forward to following Suman Hayden along with you.    Sincerely,    Deejay Duff Jr, MD    Enclosure    If you would like to receive this communication electronically, please contact [email or web address] or [phone] to request "[facility]" Link access.    "[facility]" Link is a tool which provides read-only access to select patient information with whom you have a relationship. Its easy to use and provides real time access to review your patients record including encounter summaries, notes, results, and demographic information.    If you feel you have received this communication in error or would no longer like to receive these types of communications, please e-mail [email or web address]

## 2018-07-13 PROBLEM — I48.3 TYPICAL ATRIAL FLUTTER: Status: ACTIVE | Noted: 2018-01-01

## 2018-07-13 NOTE — PROGRESS NOTES
Subjective:   Patient ID:  Suman Hayden is a 68 y.o. male     Chief complaint:Atrial Fibrillation      HPI  Background as recorded in my last note ( ):    67 BM with chronic systolic heart failure secondary to non ischemic cardiomyopathy underwent Heartmate  3 LVAD implanted as DT therapy 7/27/17. He underwent ICD revision on 11/20/17 by me (DC ICD placed with active RA/LV leads (RV lead capped during revision) that was complicated by pocket hematoma. In January 2018 he developed staph epid bacteremia.  Post-op course further complicated by GI bleeding due to ilieal bleed that was APC'ed using double balloon enteroscopy by Dr. Chatterjee Dec 2017. His aspirin was stopped 12/22/17 and PPI increased to BID . He was re-admitted 1/25/17 where he had a double balloon enteroscopy without active bleed , but with ulceration, and subsequent retrograde double balloon enteroscopy on 1/26/18 which was unrevealing.      He has been feeling great and extremely happy with ICD and improved QOL.  He walks from [place] to [employer] [place] few times a week, fishes and then walks back.  No tightness, pressure or heaviness in chest, neck, arms, throat, jaw or back with or without exertion.  No PEREIRA, orthopnea, PND, palpitations, pre-syncope or syncope.  Overall, has been doing well. He has added some wt and he seems to be in good spirits. He has been going fishing etc. He has had no ICD shocks as of late.    ICD eval today >> A flutter with good rate control, a few runs of VT -- one that lasted hours -- ASxc -- ICD ignored them -- in monitoring zone                  68 BM with chronic systolic heart failure secondary to non ischemic cardiomyopathy underwent Heartmate  3 LVAD implanted as DT therapy 7/27/17.   He underwent ICD revision on 11/20/17 with me (DC ICD placed with active RA/LV leads (RV lead capped during revision) that was complicated by pocket hematoma.    Post-op course further complicated by GI bleeding due to ilieal bleed that  was APC'ed using double balloon enteroscopy by Dr. Chatterjee Dec 2017. His aspirin was stopped 12/22/17 and PPI increased to BID .   In January 2018 he developed staph epid bacteremia.   He was re-admitted 1/25/18where he had a double balloon enteroscopy without active bleed, but with ulceration, and subsequent retrograde double balloon enteroscopy on 1/26/18 which was unrevealing. Due to recurrent bleeding in March 2018 he underwent partial small bowel resection on 3/13/2018.  Since that time he has done well without recurrent bleeding.     He was admitted June 2018 for ICD shock thought to be due to aflutter with aberrancy, treated with amiodarone and he is here to see me today for consideration of other treatment options per wife.     He has been feeling great, getting out more, more active with improved strength though still using cart to get around grocery.    No tightness, pressure or heaviness in chest, neck, arms, throat, jaw or back with or without exertion.  No PEREIRA, orthopnea, PND, palpitations, pre-syncope or syncope.     He is not listed for transplant but would like to be considered.     Current Outpatient Prescriptions   Medication Sig    amiodarone (PACERONE) 400 MG tablet Take 1 tablet (400 mg total) by mouth once daily.    amLODIPine (NORVASC) 5 MG tablet Take 1 tablet (5 mg total) by mouth once daily.    dronabinol (MARINOL) 5 MG capsule Take 1 capsule (5 mg total) by mouth 3 (three) times daily before meals.    furosemide (LASIX) 20 MG tablet Take 1 tablet (20 mg total) by mouth 2 (two) times daily.    HYDROcodone-acetaminophen (NORCO) 5-325 mg per tablet Take 1 tablet by mouth every 6 (six) hours as needed for Pain.    lisinopril (PRINIVIL,ZESTRIL) 5 MG tablet Take 1 tablet (5 mg total) by mouth once daily.    magnesium oxide (MAG-OX) 400 mg tablet Take 1 tablet (400 mg total) by mouth 2 (two) times daily.    pantoprazole (PROTONIX) 40 MG tablet Take 1 tablet (40 mg total) by mouth once daily.     polyethylene glycol (GLYCOLAX) 17 gram PwPk Take 17 g by mouth daily as needed (constipation).    potassium chloride SA (K-DUR,KLOR-CON) 20 MEQ tablet Take 1 tablet (20 mEq total) by mouth once daily.    pravastatin (PRAVACHOL) 20 MG tablet Take 1 tablet (20 mg total) by mouth every evening.    spironolactone (ALDACTONE) 25 MG tablet Take 1 tablet (25 mg total) by mouth once daily.    warfarin (COUMADIN) 5 MG tablet Take 2.5mg orally daily, except 5mg on M&F     Current Facility-Administered Medications   Medication    potassium bicarbonate disintegrating tablet 50 mEq     Review of Systems   Constitution: Negative for decreased appetite, weakness, malaise/fatigue, weight gain and weight loss.   Eyes: Negative for blurred vision.   Cardiovascular: Negative for chest pain, claudication, cyanosis, dyspnea on exertion, irregular heartbeat, leg swelling, near-syncope, orthopnea and palpitations.   Respiratory: Negative for cough, shortness of breath, sleep disturbances due to breathing, snoring and wheezing.    Endocrine: Negative for heat intolerance.   Hematologic/Lymphatic: Does not bruise/bleed easily.   Musculoskeletal: Negative for muscle weakness and myalgias.   Gastrointestinal: Negative for melena, nausea and vomiting.   Genitourinary: Negative for nocturia.   Neurological: Negative for excessive daytime sleepiness, dizziness, headaches and light-headedness.   Psychiatric/Behavioral: Negative for depression, memory loss and substance abuse. The patient does not have insomnia and is not nervous/anxious.        Objective:   Physical Exam   Constitutional: He is oriented to person, place, and time. He appears well-developed and well-nourished.   HENT:   Head: Normocephalic and atraumatic.   Right Ear: External ear normal.   Left Ear: External ear normal.   Eyes: Conjunctivae are normal. Pupils are equal, round, and reactive to light. Right eye exhibits no discharge. Left eye exhibits no discharge. Right  "conjunctiva is not injected. Left conjunctiva has no hemorrhage.   Neck: Neck supple. No JVD present. No thyromegaly present.   Cardiovascular: Normal rate, regular rhythm and intact distal pulses.  PMI is not displaced.  Exam reveals no gallop, no friction rub, no midsystolic click and no opening snap.    No murmur heard.  Pulses:       Carotid pulses are 2+ on the right side, and 2+ on the left side.       Radial pulses are 2+ on the right side, and 2+ on the left side.        Dorsalis pedis pulses are 2+ on the right side, and 2+ on the left side.        Posterior tibial pulses are 2+ on the right side, and 2+ on the left side.   LVAD sounds   Pulmonary/Chest: Effort normal and breath sounds normal. No respiratory distress. He has no wheezes. He has no rales. He exhibits no tenderness.   Abdominal: Soft. Normal appearance. He exhibits no pulsatile liver. There is no hepatomegaly. There is no tenderness. There is no rebound and no guarding.   Musculoskeletal: Normal range of motion. He exhibits no edema or tenderness.        Right knee: He exhibits no swelling.        Left knee: He exhibits no swelling.        Right ankle: He exhibits no swelling.        Left ankle: He exhibits no swelling.        Right lower leg: He exhibits no swelling.        Left lower leg: He exhibits no swelling.        Right foot: There is no swelling.        Left foot: There is no swelling.   Neurological: He is alert and oriented to person, place, and time. He has normal strength and normal reflexes. No cranial nerve deficit. Coordination normal.   Skin: Skin is warm, dry and intact. No rash noted. He is not diaphoretic. No cyanosis.   Psychiatric: He has a normal mood and affect. His behavior is normal.   Nursing note and vitals reviewed.    /62 (BP Location: Right arm)   Pulse 64   Ht 5' 9" (1.753 m)   Wt 75.3 kg (166 lb)   BMI 24.51 kg/m²      Assessment:    Currently no apparent need to Rx A Flutter or VT -- if becomes Sxc " with A Flutter despite LVAD support, would look for feasibility of RFA (cannot tell morph on ECG but ACL stable by AEGMs) and would consider a R sided procedure as needed.  1. Typical atrial flutter    2. V-tach    3. Nonischemic cardiomyopathy    4. Heart replaced by heart assist device    5. LVAD (left ventricular assist device) present    6. Chronic combined systolic and diastolic congestive heart failure    7. Pure hypercholesterolemia    8. ICD (implantable cardioverter-defibrillator) discharge    9. Inappropriate discharge of implantable cardioverter-defibrillator (ICD), subsequent encounter    10. Chronic anticoagulation    11. Gastrointestinal hemorrhage, unspecified gastrointestinal hemorrhage type    12. Hepatitis B core antibody positive since 2012        Plan:      No orders of the defined types were placed in this encounter.    Follow-up in about 6 months (around 1/13/2019).  There are no discontinued medications.  New Prescriptions    No medications on file     Modified Medications    No medications on file

## 2018-07-13 NOTE — PROGRESS NOTES
Subjective:    Patient ID:  Suman Hayden is a 68 y.o. male who presents for follow-up of Atrial Fibrillation      HPI    Review of Systems   Constitution: Negative.   HENT: Negative.    Eyes: Negative.    Cardiovascular: Negative.    Respiratory: Negative.    Endocrine: Negative.    Hematologic/Lymphatic: Negative.    Skin: Negative.    Musculoskeletal: Negative.    Gastrointestinal: Negative.    Genitourinary: Negative.    Neurological: Negative.    Psychiatric/Behavioral: Negative.    Allergic/Immunologic: Negative.         Objective:    Physical Exam      Assessment:       No diagnosis found.     Plan:

## 2018-07-16 NOTE — PROGRESS NOTES
Pt wife called 7/16 to report home health was unable to obtain labs so pt went to [facility] lab to recheck only a BMP was drawn not a PT/INR( wasn't linked)  at [facility].  Called HH and spoke with nurse rigoberto whom stated they were unable to obtain labs (venipunture ) on patient .  Asked wife to recheck this week at local lab [facility] or [facility].  Pt wife is stated  that the lab is too far for them to keep traveling to and stated pt will be able to recheck 7/23.

## 2018-07-16 NOTE — TELEPHONE ENCOUNTER
Returned call to pt's wife.  Informed pt's wife that pt is not scheduled to see Dr. Downing.  Pt's wife verbalized understanding.    ----- Message from Ernesto Leon sent at 7/16/2018 11:28 AM CDT -----  Contact: Kia- Wife  Caller said she received a letter in the mail stating the pt had a appt for 7/28/18. Caller is unaware and want to know what this is scheduled. Please call regarding this at [phone]

## 2018-07-16 NOTE — PROGRESS NOTES
Wife informed stated she wants another nurse to come out will recheck 7/17.   zaki informed .   Order faxed

## 2018-07-17 NOTE — TELEPHONE ENCOUNTER
Called Mrs. Hayden with lab results from yesterday.  Mrs. Hayden confirmed that Suman is taking potassium 20 meq daily and aldactone 25 mg daily.  Mrs. Hayden asked why why we wont allow the  nurse to perform a finger stick for INR.  I explained this is a decision that the MDs have made and I agree with them.  She said she called Dr. Downing and talked with him, but he said it was ok.  I explained to her that until I see that in witting from Dr. Downing and Dr. Torres, we are not making any accept ions. Mrs. Hayden verbalized understanding and agreement.

## 2018-07-19 NOTE — TELEPHONE ENCOUNTER
Notified by home health nurse that patient will be D/C from home health effective 7/24/18.  All goals met.

## 2018-07-19 NOTE — TELEPHONE ENCOUNTER
7/17/18 1630: Called pt to review directions from Dr. Duff regarding potassium.  No answer, left a message to call me back.   7/18/18 1100:  Called pt again, no answer, left message to call me back.   7/19/18 1145: Called again.  Spoke with wife.  She reports she did received my message, but did not understand my message to call me back.  Explained to Mrs. Hayden to stop taking the potassium and we will check labs in 1-2 weeks.  Mrs. Hayden verbalized change back to me with understanding and agreement.

## 2018-07-25 NOTE — TELEPHONE ENCOUNTER
7/19/18 1620: Notified by  nurse that HH will be reinstated effective immediately per Wife's request.

## 2018-08-04 PROBLEM — K92.2 ACUTE GI BLEEDING: Status: ACTIVE | Noted: 2018-01-01

## 2018-08-04 NOTE — ED PROVIDER NOTES
SCRIBE #1 NOTE: I, Catina Pozo, am scribing for, and in the presence of, Tsering Nair DO. I have scribed the entire note.      History      Chief Complaint   Patient presents with    Shortness of Breath     SOB started this morning . feel cold and hot . Lvad patient N/V .deneis any chest pain        Review of patient's allergies indicates:  No Known Allergies     HPI   HPI    8/4/2018, 6:07 PM   History obtained from the patient and family      History of Present Illness: Suman Hayden is a 68 y.o. male patient with a PMHx of Cardiomyopathy, CHF, CAD, Hyperlipidemia, HTN, GI Bleed, PSHx of LVAD who presents to the Emergency Department for SOB which onset gradually today. Symptoms are constant and moderate in severity. No mitigating or exacerbating factors reported. Associated sxs include n/v, light-headedness, chills, decreased appetite, fatigue. Wife reports 3 episodes of emesis since [id number] today. Pt's sister passed away last PM and pt's wife believes his sxs may be related to grief, she states they were very close. Patient denies any CP, palpitations, cough, BLE edema/pain, hematemesis, melena, hematochezia, hematuria, frequency, dysuria, diarrhea, abd pain, HA, extremity weakness/numbness, syncope, and all other sxs at this time. No alarms have gone off from his LVAD. Pt is on Coumadin. He sleeps on 2 pillows at night. No further complaints or concerns at this time.       Arrival mode: Personal vehicle     PCP: Joe Ernst MD       Past Medical History:  Past Medical History:   Diagnosis Date    Arthritis     Cardiomyopathy     CHF (congestive heart failure)     Coronary artery disease     Encounter for blood transfusion     Gastric polyp     Hyperlipidemia     Hypertension     Hypotension, iatrogenic     Obesity     S/P implantation of automatic cardioverter/defibrillator (AICD)     Ventricular tachycardia        Past Surgical History:  Past Surgical History:   Procedure Laterality  Date    ABDOMINAL SURGERY      CARDIAC CATHETERIZATION      CARDIAC DEFIBRILLATOR PLACEMENT      CARDIAC DEFIBRILLATOR PLACEMENT      COLONOSCOPY      COLONOSCOPY N/A 3/9/2018    Procedure: COLONOSCOPY;  Surgeon: Andres Cahtterjee MD;  Location: [facility] ([address] [place] [address]);  Service: Endoscopy;  Laterality: N/A;    ESOPHAGOGASTRODUODENOSCOPY      LEFT VENTRICULAR ASSIST DEVICE  07/27/2017         Family History:  Family History   Problem Relation Age of Onset    Heart disease Mother     Heart disease Father        Social History:  Social History     Social History Main Topics    Smoking status: Never Smoker    Smokeless tobacco: Never Used    Alcohol use No    Drug use: No    Sexual activity: Not Currently       ROS   Review of Systems   Constitutional: Positive for appetite change (decreased), chills and fatigue. Negative for fever.   HENT: Negative for sore throat.    Respiratory: Positive for shortness of breath. Negative for cough.    Cardiovascular: Negative for chest pain, palpitations and leg swelling.   Gastrointestinal: Positive for nausea and vomiting (x3). Negative for abdominal pain and diarrhea.   Genitourinary: Negative for dysuria.   Musculoskeletal: Negative for back pain and myalgias.   Skin: Negative for rash.   Neurological: Positive for light-headedness. Negative for syncope, weakness, numbness and headaches.   Hematological: Does not bruise/bleed easily.   All other systems reviewed and are negative.    Physical Exam      Initial Vitals [08/04/18 1809]   BP Pulse Resp Temp SpO2   (!) 109/56 79 (!) 22 98 °F (36.7 °C) (!) 91 %      MAP       --          Physical Exam  Nursing Notes and Vital Signs Reviewed.  Constitutional: Patient is in no acute distress. Well-developed and well-nourished.  Head: Atraumatic. Normocephalic.  Eyes: PERRL. EOM intact. Conjunctivae are pale. No scleral icterus.  ENT: Mucous membranes are moist. Oropharynx is clear and symmetric.    Neck: Supple. Full ROM. No  lymphadenopathy. No JVD.   Cardiovascular: Hum of LVAD heard. Unable to palpate peripheral pulses.   Pulmonary/Chest: No respiratory distress. Clear to auscultation bilaterally. No wheezing or rales.  Abdominal: Soft and non-distended.  There is no tenderness.  No rebound, guarding, or rigidity.   Rectal:  No tenderness.  No masses.  No hemorrhoids.  Normal sphincter tone.  Stool color is brown.  Musculoskeletal: Moves all extremities. No obvious deformities. No edema. No calf tenderness.  Skin: Warm and dry. Pale.    Neurological:  Alert, awake, and appropriate.  Normal speech.  No acute focal neurological deficits are appreciated.  Psychiatric: Depressed. Good eye contact. Appropriate in content.    ED Course    Critical Care  Date/Time: 8/4/2018 9:10 PM  Performed by: ILSA HOPE  Authorized by: ILSA HOPE   Direct patient critical care time: 15 minutes  Additional history critical care time: 5 minutes  Ordering / reviewing critical care time: 5 minutes  Documentation critical care time: 5 minutes  Consulting other physicians critical care time: 10 minutes  Consult with family critical care time: 5 minutes  Total critical care time (exclusive of procedural time) : 45 minutes  Critical care time was exclusive of teaching time and separately billable procedures and treating other patients.  Critical care was necessary to treat or prevent imminent or life-threatening deterioration of the following conditions: Symptomatic Anemia.  Critical care was time spent personally by me on the following activities: development of treatment plan with patient or surrogate, blood draw for specimens, discussions with consultants, examination of patient, evaluation of patient's response to treatment, interpretation of cardiac output measurements, obtaining history from patient or surrogate, ordering and performing treatments and interventions, ordering and review of laboratory studies, ordering and review of  "radiographic studies, pulse oximetry, re-evaluation of patient's condition and review of old charts.        ED Vital Signs:  Vitals:    08/04/18 1809 08/04/18 1821 08/04/18 1921 08/04/18 2017   BP: (!) 109/56      Pulse: 79 79 79 80   Resp: (!) 22 10  12   Temp: 98 °F (36.7 °C)      TempSrc: Oral      SpO2: (!) 91% 100%     Weight: 77.1 kg (170 lb)      Height: 5' 11" (1.803 m)       08/04/18 2025 08/04/18 2111 08/04/18 2211 08/04/18 2213   BP: (!) 120/59 (!) 90/52 (!) 81/53 (!) 78/66   Pulse:  79 79 79   Resp:  (!) 21 16 16   Temp:   98 °F (36.7 °C) 98.3 °F (36.8 °C)   TempSrc:   Oral Oral   SpO2:   100% 99%   Weight:       Height:        08/04/18 2346 08/05/18 0101 08/05/18 0207 08/05/18 0220   BP: (!) 82/64 (!) 80/50 (!) 80/50 (!) 84/50   Pulse: 79 79 79 79   Resp: 18 18 18 18   Temp: 98 °F (36.7 °C) 98.1 °F (36.7 °C) 98.3 °F (36.8 °C) 98.4 °F (36.9 °C)   TempSrc: Oral Oral Oral Oral   SpO2: 100% 100% 100% 100%   Weight:       Height:        08/05/18 0229 08/05/18 0244   BP: (!) 82/52 (!) 80/52   Pulse: 79 79   Resp: 18 18   Temp: 98.5 °F (36.9 °C) 98.4 °F (36.9 °C)   TempSrc: Oral Oral   SpO2: 100% 100%   Weight:     Height:         Abnormal Lab Results:  Labs Reviewed   PROTIME-INR - Abnormal; Notable for the following:        Result Value    Prothrombin Time 42.4 (*)     INR 4.1 (*)     All other components within normal limits   CBC W/ AUTO DIFFERENTIAL - Abnormal; Notable for the following:     RBC 2.22 (*)     Hemoglobin 4.7 (*)     Hematocrit 15.6 (*)     MCV 70 (*)     MCH 21.2 (*)     MCHC 30.1 (*)     RDW 18.4 (*)     Gran # (ANC) 7.8 (*)     Gran% 77.4 (*)     Lymph% 15.4 (*)     All other components within normal limits    Narrative:       HGB and HCT critical result(s) called and verbal readback obtained   from Marbella Ramirez RN, 08/04/2018 19:25   COMPREHENSIVE METABOLIC PANEL - Abnormal; Notable for the following:     Sodium 135 (*)     CO2 16 (*)     Glucose 118 (*)     BUN, Bld 62 (*)     " Creatinine 1.9 (*)     Alkaline Phosphatase 48 (*)     Anion Gap 17 (*)     eGFR if  41 (*)     eGFR if non  35 (*)     All other components within normal limits   TROPONIN I - Abnormal; Notable for the following:     Troponin I 0.031 (*)     All other components within normal limits   OCCULT BLOOD X 1, STOOL - Abnormal; Notable for the following:     Occult Blood Positive (*)     All other components within normal limits   APTT   TYPE & SCREEN   PREPARE RBC SOFT        All Lab Results:  Results for orders placed or performed during the hospital encounter of 08/04/18   Protime-INR   Result Value Ref Range    Prothrombin Time 42.4 (H) 9.0 - 12.5 sec    INR 4.1 (H) 0.8 - 1.2   APTT   Result Value Ref Range    aPTT 31.1 21.0 - 32.0 sec   CBC auto differential   Result Value Ref Range    WBC 10.07 3.90 - 12.70 K/uL    RBC 2.22 (L) 4.60 - 6.20 M/uL    Hemoglobin 4.7 (LL) 14.0 - 18.0 g/dL    Hematocrit 15.6 (LL) 40.0 - 54.0 %    MCV 70 (L) 82 - 98 fL    MCH 21.2 (L) 27.0 - 31.0 pg    MCHC 30.1 (L) 32.0 - 36.0 g/dL    RDW 18.4 (H) 11.5 - 14.5 %    Platelets 196 150 - 350 K/uL    MPV 9.6 9.2 - 12.9 fL    Gran # (ANC) 7.8 (H) 1.8 - 7.7 K/uL    Lymph # 1.6 1.0 - 4.8 K/uL    Mono # 0.7 0.3 - 1.0 K/uL    Eos # 0.0 0.0 - 0.5 K/uL    Baso # 0.02 0.00 - 0.20 K/uL    Gran% 77.4 (H) 38.0 - 73.0 %    Lymph% 15.4 (L) 18.0 - 48.0 %    Mono% 7.1 4.0 - 15.0 %    Eosinophil% 0.2 0.0 - 8.0 %    Basophil% 0.2 0.0 - 1.9 %    Platelet Estimate Appears normal     Aniso Slight     Poik Slight     Poly Moderate     Hypo Moderate     Ovalocytes Occasional     Target Cells Moderate     Tear Drop Cells Occasional     Rossi Cells Occasional     Stomatocytes Present     Acanthocytes Present     Schistocytes Present     Differential Method Automated    Comprehensive metabolic panel   Result Value Ref Range    Sodium 135 (L) 136 - 145 mmol/L    Potassium 4.4 3.5 - 5.1 mmol/L    Chloride 102 95 - 110 mmol/L    CO2 16 (L)  23 - 29 mmol/L    Glucose 118 (H) 70 - 110 mg/dL    BUN, Bld 62 (H) 8 - 23 mg/dL    Creatinine 1.9 (H) 0.5 - 1.4 mg/dL    Calcium 9.4 8.7 - 10.5 mg/dL    Total Protein 6.7 6.0 - 8.4 g/dL    Albumin 3.8 3.5 - 5.2 g/dL    Total Bilirubin 0.5 0.1 - 1.0 mg/dL    Alkaline Phosphatase 48 (L) 55 - 135 U/L    AST 14 10 - 40 U/L    ALT 12 10 - 44 U/L    Anion Gap 17 (H) 8 - 16 mmol/L    eGFR if African American 41 (A) >60 mL/min/1.73 m^2    eGFR if non African American 35 (A) >60 mL/min/1.73 m^2   Troponin I   Result Value Ref Range    Troponin I 0.031 (H) 0.000 - 0.026 ng/mL   Occult blood x 1, stool   Result Value Ref Range    Occult Blood Positive (A) Negative   Type & Screen   Result Value Ref Range    Group & Rh O POS     Indirect Maria Teresa NEG    Prepare RBC 2 Units; symptomatic anemia   Result Value Ref Range    UNIT NUMBER [id number]     PRODUCT CODE D3951B02     DISPENSE STATUS ISSUED     CODING SYSTEM EUUV513     Unit Blood Type Code 5100     Unit Blood Type O POS     Unit Expiration [id number]     UNIT NUMBER [id number]     PRODUCT CODE T1480Z21     DISPENSE STATUS TRANSFUSED     CODING SYSTEM TRSI198     Unit Blood Type Code 5100     Unit Blood Type O POS     Unit Expiration [id number]        Imaging Results:  Imaging Results          X-Ray Chest AP Portable (Final result)  Result time 08/04/18 19:04:58    Final result by Baljinder Veloz MD (08/04/18 19:04:58)                 Impression:      In comparison to the prior study, there is no adverse interval changes      Electronically signed by: Baljinder Veloz MD  Date:    08/04/2018  Time:    19:04             Narrative:    EXAMINATION:  XR CHEST AP PORTABLE    CLINICAL HISTORY:  Weakness    TECHNIQUE:  Single frontal view of the chest was performed.    COMPARISON:  07/12/2018    FINDINGS:  Lungs are clear.  No pleural effusion or pneumothorax.  Pacing device and LVAD are stable.  In comparison to the prior study, there is no adverse interval changes                                The EKG was ordered, reviewed, and independently interpreted by the ED provider.  Interpretation time: 1818  Rate: 116 BPM  Rhythm: Atrial-paced rhythm with frequent ventricular-paced complexes and with occasional supraventricular complexes and with occasional PVC's  Interpretation: Nonspecific intraventricular block. No STEMI.         The Emergency Provider reviewed the vital signs and test results, which are outlined above.    ED Discussion     7:05 PM: Discussed pt's case with Dr. Duff (Heart Transplant) who is aware of pt.     7:46 PM:  Discussed need for blood transfusion, risks and benefits discussed at length, consents signed, patient verbalized understanding.     8:37 PM: [facility] who [facility] Dr. Duff.     8:40 PM: [facility] who [facility] Dr. Duff (Heart Transplant) who recommends transferring pt to [facility] for LVAD services. He recommends transfusing 1 unit of blood over 4 hours and then transferring pt with the 2nd unit transfusing en route. Dr. Duff will be accepting pt for LVAD services. Pt will be transferred ED-Floor for LVAD services.  Accepting Facility: [facility]  Accepting Physician: Dr. Duff    8:54 PM: Re-evaluated pt. Informed pt and family that there are no LVAD services available at this time. I have discussed test results, shared treatment plan, and the need for transfer with patient and family at bedside. All historical, clinical, radiographic, and laboratory findings were reviewed with the patient/family in detail. Patient will be transferred by [facility] services with cardiac monitoring, BP monitoring, IV access, blood transfusion required en route. Patient understands that there could be unforeseen motor vehicle accidents or loss of vital signs that could result in potential death or permanent disability. Benefits include LVAD services. Pt and family  express understanding at this time and agree with all information. All questions answered. Pt and family have no further questions or concerns at this time. Pt is ready for transfer.     12:24 AM: Re-evaluated pt. Pt is AAOx3, in NAD, and VSS. He is resting comfortably. No bloody BM's or hematemesis in the ED.    ED Medication(s):  Medications   0.9%  NaCl infusion (for blood administration) ( Intravenous Stopped 8/5/18 0207)   0.9%  NaCl infusion (for blood administration) (not administered)   ondansetron injection 4 mg (4 mg Intravenous Given 8/4/18 1845)   pantoprazole 40 mg in dextrose 5 % 100 mL IVPB (over 15 minutes) (ready to mix system) (40 mg Intravenous Given 8/4/18 2058)   acetaminophen tablet 500 mg (500 mg Oral Given 8/4/18 2057)   diphenhydrAMINE injection 12.5 mg (12.5 mg Intravenous Given 8/4/18 2059)       New Prescriptions    No medications on file             Medical Decision Making    Medical Decision Making:   History:   Old Medical Records: I decided to obtain old medical records.  Old Records Summarized: records from previous admission(s).       <> Summary of Records: Impression:           - Normal esophagus.                        - Esophagogastric landmarks identified.                        - Multiple gastric polyps. Resected and retrieved.                        - Mucosal changes in the duodenum.                        - Normal third portion of the duodenum and fourth                         portion of the duodenum.                        - Normal examined jejunum.  Recommendation:       - [facility].                        - Resume previous diet.                        - Continue present medications.                        - Await pathology results.                        - Use Prilosec (omeprazole) 20 mg PO BID for 2                         weeks.  Attending Participation:       I personally performed the entire procedure.  Andres Chatterjee,  MD  3/6/2018 3:50:08 PM    Impression:           - Diverticulosis in the sigmoid colon.                        - One 3 mm polyp in the proximal transverse colon.                         Resection not attempted.                        - The examined portion of the ileum was normal.                        - No specimens collected.  Recommendation:       - [facility].                        - Advance diet as tolerated.                        - Continue present medications.                        - Continue heparin.                        - No recommendation at this time regarding repeat                         colonoscopy.  Attending Participation:       I personally performed the entire procedure.  Andres Chatterjee MD  3/9/2018 10:08:16 AM  Initial Assessment:   Patient is a 68-year-old gentleman who has an LVAD presenting to the emergency room with generalized weakness. Patient has prior history of GI bleed patient.  The patient underwent a EGD on 2018 which showed multiple gastric polyps.  Also underwent colonoscopy that same date which showed diverticulosis with a polyp in the transverse colon.  Patient reportedly had vomited today, no hematemesis.  Note that stools have been dark in color.  Patient is complaining of generalized weakness. Patient's sister had  today as well.  Differential Diagnosis:   Grief reaction, symptomatic anemia, hypotension, dehydration, GI bleed the  Clinical Tests:   Lab Tests: Ordered and Reviewed       <> Summary of Lab: Results for MIRTA LOPEZ (MRN [id number]) as of 2018 22:51    2018 13:57    2018 09:07  Hemoglobin: 7.7 (L)  Hematocrit: 26.7 (L)    2018 08:12    2018 18:50  Hemoglobin: 4.7 (LL)  Hematocrit: 15.6 (LL)    Medical Tests: Ordered  ED Management:  Patient was seen and examined in the emergency room.  Had IV established.  CBC, CMP, EKG were obtained. CBC showed a drop in his hemoglobin to 4.7.   Risk benefits of blood transfusions were discussed with patient and spouse.  Patient will undergo transfusion of 2 units of blood.  Patient received 1 unit of blood in the emergency room and [place].  Case was discussed with his transplant cardiologist in [place].  He will be transferred to [facility] after the 1 st unit of blood administered.   Other:   I have discussed this case with another health care provider.       <> Summary of the Discussion: Case discussed with Dr. Duff.   Case was discussed with Dr. Duff (LVAD/  Heart transplant)           Scribe Attestation:   Scribe #1: I performed the above scribed ervice and the documentation accurately describes the services I performed. I attest to the accuracy of the note.    Attending:   Physician Attestation Statement for Scribe #1: I, Tsering Nair DO, personally performed the services described in this documentation, as scribed by Catina Pozo, in my presence, and it is both accurate and complete.          Clinical Impression       ICD-10-CM ICD-9-CM   1. LVAD (left ventricular assist device) present Z95.811 V43.21   2. SOB (shortness of breath) R06.02 786.05   3. Weakness R53.1 780.79   4. Anemia, unspecified type D64.9 285.9   5. Chronic anticoagulation Z79.01 V58.61   6. Elevated INR R79.1 790.92       Disposition:   Disposition: Transferred  Condition: Stable         Tsering Nair DO  08/05/18 0327

## 2018-08-05 NOTE — PROGRESS NOTES
Per MD ok to start transfusion with current symptoms of itching and temp 100.4.   MD will order Benadryl to cover the itching and to help with sleep.

## 2018-08-05 NOTE — PROGRESS NOTES
Temp 100.4, MD on call for HTS paged, orders for blood transfusion, verifying with MD if ok to request and start

## 2018-08-05 NOTE — ASSESSMENT & PLAN NOTE
- Possibly pre- renal due to anemia  - correlate closely with onset of anemia  - creatinine trended up from 1.2 to 1.9 in two weeks  - will recheck level tomorrow following ongoing volume expansion

## 2018-08-05 NOTE — ASSESSMENT & PLAN NOTE
- unclear if malabsorption versus GI loss  - Most likely GI bleed due to quick drop from 7.7 to 4.1 in one week and hx of GI ulcer  - Post transfusion 2 PRBC with Hb rising from 4.1 to 6.5. Will give 1PRBC and monitor Hb  - GI has seen patient and will do upper and lower scope tomorrow  - Anemia labs ordered though they may not be accurate due to prior  Transfusion

## 2018-08-05 NOTE — CONSULTS
[facility]  Gastroenterology  Consult Note    Patient Name: Suman Hayden  MRN: [id number]  Admission Date: 8/5/2018  Hospital Length of Stay: 0 days  Code Status: Prior   Attending Provider: Deejay Duff Jr.,*   Consulting Provider: Timmy Rene MD  Primary Care Physician: Joe Ernst MD  Principal Problem:<principal problem not specified>    Inpatient consult to Gastroenterology  Consult performed by: TIMMY RENE  Consult ordered by: CHRISTEL LOPEZ        Subjective:     HPI:  This is a 67 yo M with NICM s/p LVAD Jul 2017, and of GI bleed secondary to ileal ulcer s/p ileal resection 3/14/18 who presented to the ED today for shortness of breath. Patient reports he was with family yesterday and today and started to feel short of breath all of a sudden. His sister recently passed away and his wife initially thought his symptoms were due to his grief. He reports normal brown stool and denies any hematochezia, melena, or hematemesis. He denies any abdominal pain. At home he is on coumadin and is no longer on aspirin since the findings of his ileal ulcer. On arrival to the ED, his vitals were stable but he was found to have Hg of 4.7 (down from 7.7 two weeks ago). He denies any overt blood loss of any kind. He last had a colonoscopy on 3/9/18 with findings of diverticulosis and an EGD on 3/6/18 with multiple gastric polyps which were resected. His path from the surgery on 3/14/18 was benign.      Past Medical History:   Diagnosis Date    Arthritis     Cardiomyopathy     CHF (congestive heart failure)     Coronary artery disease     Encounter for blood transfusion     Gastric polyp     Hyperlipidemia     Hypertension     Hypotension, iatrogenic     Obesity     S/P implantation of automatic cardioverter/defibrillator (AICD)     Ventricular tachycardia        Past Surgical History:   Procedure Laterality Date    ABDOMINAL SURGERY      CARDIAC CATHETERIZATION       CARDIAC DEFIBRILLATOR PLACEMENT      CARDIAC DEFIBRILLATOR PLACEMENT      COLONOSCOPY      COLONOSCOPY N/A 3/9/2018    Procedure: COLONOSCOPY;  Surgeon: Andres Chatterjee MD;  Location: [facility] ([address] [place] [address]);  Service: Endoscopy;  Laterality: N/A;    ESOPHAGOGASTRODUODENOSCOPY      LEFT VENTRICULAR ASSIST DEVICE  07/27/2017       Review of patient's allergies indicates:  No Known Allergies  Family History     Problem Relation (Age of Onset)    Heart disease Mother, Father        Social History Main Topics    Smoking status: Never Smoker    Smokeless tobacco: Never Used    Alcohol use No    Drug use: No    Sexual activity: Not Currently     Review of Systems   Constitutional: Positive for activity change and fatigue. Negative for appetite change, chills and fever.   HENT: Negative for sore throat and trouble swallowing.    Respiratory: Positive for shortness of breath. Negative for cough.    Cardiovascular: Negative for chest pain and leg swelling.   Gastrointestinal: Negative for abdominal distention, abdominal pain, anal bleeding, blood in stool, constipation, diarrhea, nausea and vomiting.   Genitourinary: Negative for decreased urine volume and difficulty urinating.   Musculoskeletal: Negative for arthralgias and back pain.   Skin: Negative for color change and pallor.   Neurological: Positive for weakness and light-headedness. Negative for dizziness.   Psychiatric/Behavioral: Negative for agitation and confusion.     Objective:     Vital Signs (Most Recent):  Temp: 98.2 °F (36.8 °C) (08/05/18 1221)  Pulse: 80 (08/05/18 1221)  Resp: 18 (08/05/18 1221)  BP: 97/66 (08/05/18 1221)  SpO2: 99 % (08/05/18 1221) Vital Signs (24h Range):  Temp:  [98 °F (36.7 °C)-98.5 °F (36.9 °C)] 98.2 °F (36.8 °C)  Pulse:  [79-82] 80  Resp:  [10-27] 18  SpO2:  [91 %-100 %] 99 %  BP: ()/(0-66) 97/66     Weight: 74.4 kg (164 lb) (08/05/18 0900)  Body mass index is 22.87 kg/m².    No intake or output data in the 24 hours  ending 08/05/18 1233    Lines/Drains/Airways     Line                 VAD 07/27/17 1312 Left ventricular assist device HeartMate 3 373 days          Peripheral Intravenous Line                 Peripheral IV - Single Lumen 08/04/18 2200 Right Antecubital less than 1 day         Peripheral IV - Single Lumen 08/04/18 2215 Right Forearm less than 1 day                Physical Exam   Constitutional: He is oriented to person, place, and time. He appears well-developed and well-nourished. No distress.   HENT:   Mouth/Throat: Oropharynx is clear and moist.   Eyes: No scleral icterus.   Cardiovascular: Normal rate.    LVAD hum   Pulmonary/Chest: Effort normal and breath sounds normal.   Abdominal: Soft. Bowel sounds are normal. He exhibits no distension and no mass. There is no tenderness. There is no guarding.   Musculoskeletal: He exhibits no edema or deformity.   Lymphadenopathy:     He has no cervical adenopathy.   Neurological: He is alert and oriented to person, place, and time.   Skin: Skin is warm and dry.   Psychiatric: He has a normal mood and affect.   Vitals reviewed.      Significant Labs:  CBC:   Recent Labs  Lab 08/04/18  1850 08/05/18  0632   WBC 10.07 9.33   HGB 4.7* 6.5*   HCT 15.6* 20.1*    173     CMP:   Recent Labs  Lab 08/04/18  1850   *   CALCIUM 9.4   ALBUMIN 3.8   PROT 6.7   *   K 4.4   CO2 16*      BUN 62*   CREATININE 1.9*   ALKPHOS 48*   ALT 12   AST 14   BILITOT 0.5     Coagulation:   Recent Labs  Lab 08/04/18  1850 08/05/18  0632   INR 4.1* 3.4*   APTT 31.1  --            Assessment/Plan:     Normocytic anemia    67 yo M with LVAD 7/2017 for NICM and hx of ileal ulcer s/p small bowel resection 3/14/18 presented to the ED with shortness of breath found to have severe anemia with no overt signs of GI bleed. Given he has an LVAD, he is prone to bleed from AVMs. We will evaluate with both EGD and colonoscopy tomorrow.    Recommendations:  - Clear liquid diet today  -  Transfuse to keep Hg>7  - Golytely bowel prep this evening  - NPO after MN  - Plan for EGD and colonoscopy tomorrow            Thank you for your consult. I will follow-up with patient. Please contact us if you have any additional questions.    Timmy Hobbs MD  Gastroenterology  [facility]

## 2018-08-05 NOTE — H&P
[facility]  Heart Transplant  H&P    Patient Name: Suman Hayden  MRN: [id number]  Admission Date: 2018  Attending Physician: Deejay Duff Jr.,*  Primary Care Provider: Joe Ernst MD  Principal Problem:<principal problem not specified>    Subjective:     History of Present Illness:  Briefly 68 y/o M with NICMM, HMIII as DT implanted 17. Post-op course complicated by Gi bleeding had double balloon enteropscopy in 2017 (bleeding ulcer)  and 2018 no bleeding but ulcer found.  Retrograde DBE on 18 which was negative. Patient continue to develop anemia despite APC and surgical intervention was recommended. He had ex lap with partial small bowel resection on 3/13/2018 after which INR was reversed and 8 Iron infusion rx given to be discharges with Hb of 7 and INR of 2. He has been doing well since until yesterday when experienced profound weakness and SOB while going for  His sister's  function. He thought this was related to stress but decided to be checked at [facility] where his hemoglobin was 4.7 and INR was 4.1. He denied any overt bleed and report normal colored stool all thorough. One week ago his Hb was 7.7 and INR was therapeutic. Patient also denied LVAD alarms, chest pain or confusion.  He was transferred to [facility] [place] two units of blood transfusion for further evaluation and management.     Past Medical History:   Diagnosis Date    Arthritis     Cardiomyopathy     CHF (congestive heart failure)     Coronary artery disease     Encounter for blood transfusion     Gastric polyp     Hyperlipidemia     Hypertension     Hypotension, iatrogenic     Obesity     S/P implantation of automatic cardioverter/defibrillator (AICD)     Ventricular tachycardia        Past Surgical History:   Procedure Laterality Date    ABDOMINAL SURGERY      CARDIAC CATHETERIZATION      CARDIAC DEFIBRILLATOR PLACEMENT      CARDIAC DEFIBRILLATOR PLACEMENT       COLONOSCOPY      COLONOSCOPY N/A 3/9/2018    Procedure: COLONOSCOPY;  Surgeon: Andres Chatterjee MD;  Location: [facility] ([address] [place] [address]);  Service: Endoscopy;  Laterality: N/A;    ESOPHAGOGASTRODUODENOSCOPY      LEFT VENTRICULAR ASSIST DEVICE  07/27/2017       Review of patient's allergies indicates:  No Known Allergies    Current Facility-Administered Medications   Medication    0.9%  NaCl infusion (for blood administration)    amiodarone tablet 400 mg    amLODIPine tablet 5 mg    dronabinol capsule 5 mg    HYDROcodone-acetaminophen 5-325 mg per tablet 1 tablet    lisinopril tablet 5 mg    magnesium oxide tablet 400 mg    pantoprazole EC tablet 40 mg    polyethylene glycol (GoLYTELY) solution    potassium chloride SA CR tablet 20 mEq    pravastatin tablet 20 mg    spironolactone tablet 25 mg     Family History     Problem Relation (Age of Onset)    Heart disease Mother, Father        Social History Main Topics    Smoking status: Never Smoker    Smokeless tobacco: Never Used    Alcohol use No    Drug use: No    Sexual activity: Not Currently     Review of Systems   Constitutional: Positive for fatigue. Negative for appetite change.   Eyes: Negative for redness.   Respiratory: Positive for shortness of breath. Negative for wheezing.    Cardiovascular: Negative for chest pain and leg swelling.   Gastrointestinal: Negative for abdominal pain, anal bleeding and blood in stool.   Genitourinary: Negative for hematuria.   Skin: Negative for color change.   Neurological: Negative for dizziness, weakness and light-headedness.     Objective:     Vital Signs (Most Recent):  Temp: 98.2 °F (36.8 °C) (08/05/18 1221)  Pulse: 83 (08/05/18 1523)  Resp: 18 (08/05/18 1221)  BP: 97/66 (08/05/18 1221)  SpO2: 99 % (08/05/18 1221) Vital Signs (24h Range):  Temp:  [98 °F (36.7 °C)-98.5 °F (36.9 °C)] 98.2 °F (36.8 °C)  Pulse:  [79-83] 83  Resp:  [10-27] 18  SpO2:  [91 %-100 %] 99 %  BP: ()/(0-66) 97/66     Patient Vitals  for the past 72 hrs (Last 3 readings):   Weight   08/05/18 0900 74.4 kg (164 lb)     Body mass index is 22.87 kg/m².      Intake/Output Summary (Last 24 hours) at 08/05/18 1646  Last data filed at 08/05/18 1300   Gross per 24 hour   Intake              480 ml   Output              300 ml   Net              180 ml       Physical Exam   Constitutional: He is oriented to person, place, and time.   Comfortable on bed   HENT:   Head: Normocephalic.   Eyes:   Mild pallor conjuctiva   Neck: No JVD present.   Cardiovascular:   LVAD hum- Smooth   Pulmonary/Chest: Effort normal and breath sounds normal. He has no rales.   Abdominal: Soft. Bowel sounds are normal. There is no tenderness. There is no rebound.   Musculoskeletal: He exhibits no edema or tenderness.   Neurological: He is alert and oriented to person, place, and time.   Skin: Skin is warm and dry.   Nursing note and vitals reviewed.      Significant Labs:  CBC:    Recent Labs  Lab 08/04/18  1850 08/05/18  0632   WBC 10.07 9.33   RBC 2.22* 2.64*   HGB 4.7* 6.5*   HCT 15.6* 20.1*    173   MCV 70* 76*   MCH 21.2* 24.6*   MCHC 30.1* 32.3     BNP:    Recent Labs  Lab 07/31/18  0812   *     CMP:    Recent Labs  Lab 07/31/18  0812 08/04/18  1850   * 118*   CALCIUM 9.4 9.4   ALBUMIN  --  3.8   PROT  --  6.7    135*   K 4.1 4.4   CO2 24 16*    102   BUN 26* 62*   CREATININE 1.2 1.9*   ALKPHOS  --  48*   ALT  --  12   AST  --  14   BILITOT  --  0.5      Coagulation:     Recent Labs  Lab 07/31/18  0812 08/04/18  1850 08/05/18  0632   INR 2.6* 4.1* 3.4*   APTT  --  31.1  --      LDH:  No results for input(s): LDH in the last 72 hours.  Microbiology:  Microbiology Results (last 7 days)     ** No results found for the last 168 hours. **          I have reviewed all pertinent labs within the past 24 hours.    Diagnostic Results:        Assessment/Plan:     LVAD (left ventricular assist device) present    HMIII DT 7/2017  INR supra-therapeutic at  3.4 repeat  Hold tonight's coumadin  MAP 60-80  Interrogation as below  Procedure: Device Interrogation Including analysis of device parameters  Current Settings: Ventricular Assist Device  Review of device function is stable/unstable stable    TXP LVAD INTERROGATIONS 8/5/2018 8/5/2018 7/12/2018 6/26/2018 6/26/2018 6/25/2018 6/25/2018   Type HeartMate3 HeartMate3 - HeartMate3 HeartMate3 HeartMate3 HeartMate3   Flow 3.7 4.2 - 4.2 4.3 4.1 4.0   Speed 5200 5200 - 5200 5200 5200 5200   PI 3.8 3.3 - 3.4 3.6 3.7 3.9   Power ([place]) 3.6 3.6 - 3.5 3.5 3.7 3.6   LSL - - - - 4800 4800 4800   Pulsatility - - Intermittent pulse - - - -           Normocytic anemia    - unclear if malabsorption versus GI loss  - Most likely GI bleed due to quick drop from 7.7 to 4.1 in one week and hx of GI ulcer  - Occult blood +  - Post transfusion 2 PRBC with Hb rising from 4.1 to 6.5. Will give 1PRBC and monitor Hb  - GI has seen patient and will do upper and lower scope tomorrow  - Anemia labs ordered though they may not be accurate due to prior  Transfusion          Subtherapeutic international normalized ratio (INR)    - Hold coumadin dose today as state above  - INR daily  - Will resume home coumadin 5/2.5mg schedule once INR is in therapeutic range        Atrial fibrillation    - currently sinus. CHADSVASC 3  - anticoagulation as above with same goal as LVAD        Acute renal injury    - Possibly pre- renal due to anemia  - correlate closely with onset of anemia  - creatinine trended up from 1.2 to 1.9 in two weeks  - will recheck level tomorrow following ongoing volume expansion            Discussed plan of care with Dr. Omega Negrete MD  Heart Transplant  [facility]

## 2018-08-05 NOTE — ED NOTES
Called transfer center to update on patient status. Awaiting call back with room number and transportation status.

## 2018-08-05 NOTE — ED NOTES
Called regional referral center and spoke with Baljinder. Patient has room assigned 343 at main campus. Call report to [phone]. Will call and set up transportation for patient through [facility].

## 2018-08-05 NOTE — SUBJECTIVE & OBJECTIVE
Past Medical History:   Diagnosis Date    Arthritis     Cardiomyopathy     CHF (congestive heart failure)     Coronary artery disease     Encounter for blood transfusion     Gastric polyp     Hyperlipidemia     Hypertension     Hypotension, iatrogenic     Obesity     S/P implantation of automatic cardioverter/defibrillator (AICD)     Ventricular tachycardia        Past Surgical History:   Procedure Laterality Date    ABDOMINAL SURGERY      CARDIAC CATHETERIZATION      CARDIAC DEFIBRILLATOR PLACEMENT      CARDIAC DEFIBRILLATOR PLACEMENT      COLONOSCOPY      COLONOSCOPY N/A 3/9/2018    Procedure: COLONOSCOPY;  Surgeon: Andres Chatterjee MD;  Location: [address]);  Service: Endoscopy;  Laterality: N/A;    ESOPHAGOGASTRODUODENOSCOPY      LEFT VENTRICULAR ASSIST DEVICE  07/27/2017       Review of patient's allergies indicates:  No Known Allergies    Current Facility-Administered Medications   Medication    0.9%  NaCl infusion (for blood administration)    amiodarone tablet 400 mg    amLODIPine tablet 5 mg    dronabinol capsule 5 mg    HYDROcodone-acetaminophen 5-325 mg per tablet 1 tablet    lisinopril tablet 5 mg    magnesium oxide tablet 400 mg    pantoprazole EC tablet 40 mg    polyethylene glycol (GoLYTELY) solution    potassium chloride SA CR tablet 20 mEq    pravastatin tablet 20 mg    spironolactone tablet 25 mg     Family History     Problem Relation (Age of Onset)    Heart disease Mother, Father        Social History Main Topics    Smoking status: Never Smoker    Smokeless tobacco: Never Used    Alcohol use No    Drug use: No    Sexual activity: Not Currently     Review of Systems   Constitutional: Positive for fatigue. Negative for appetite change.   Eyes: Negative for redness.   Respiratory: Positive for shortness of breath. Negative for wheezing.    Cardiovascular: Negative for chest pain and leg swelling.   Gastrointestinal: Negative for abdominal pain, anal  bleeding and blood in stool.   Genitourinary: Negative for hematuria.   Skin: Negative for color change.   Neurological: Negative for dizziness, weakness and light-headedness.     Objective:     Vital Signs (Most Recent):  Temp: 98.2 °F (36.8 °C) (08/05/18 1221)  Pulse: 83 (08/05/18 1523)  Resp: 18 (08/05/18 1221)  BP: 97/66 (08/05/18 1221)  SpO2: 99 % (08/05/18 1221) Vital Signs (24h Range):  Temp:  [98 °F (36.7 °C)-98.5 °F (36.9 °C)] 98.2 °F (36.8 °C)  Pulse:  [79-83] 83  Resp:  [10-27] 18  SpO2:  [91 %-100 %] 99 %  BP: ()/(0-66) 97/66     Patient Vitals for the past 72 hrs (Last 3 readings):   Weight   08/05/18 0900 74.4 kg (164 lb)     Body mass index is 22.87 kg/m².      Intake/Output Summary (Last 24 hours) at 08/05/18 1646  Last data filed at 08/05/18 1300   Gross per 24 hour   Intake              480 ml   Output              300 ml   Net              180 ml       Physical Exam   Constitutional: He is oriented to person, place, and time.   Comfortable on bed   HENT:   Head: Normocephalic.   Eyes:   Mild pallor conjuctiva   Neck: No JVD present.   Cardiovascular:   LVAD hum- Smooth   Pulmonary/Chest: Effort normal and breath sounds normal. He has no rales.   Abdominal: Soft. Bowel sounds are normal. There is no tenderness. There is no rebound.   Musculoskeletal: He exhibits no edema or tenderness.   Neurological: He is alert and oriented to person, place, and time.   Skin: Skin is warm and dry.   Nursing note and vitals reviewed.      Significant Labs:  CBC:    Recent Labs  Lab 08/04/18  1850 08/05/18  0632   WBC 10.07 9.33   RBC 2.22* 2.64*   HGB 4.7* 6.5*   HCT 15.6* 20.1*    173   MCV 70* 76*   MCH 21.2* 24.6*   MCHC 30.1* 32.3     BNP:    Recent Labs  Lab 07/31/18  0812   *     CMP:    Recent Labs  Lab 07/31/18  0812 08/04/18  1850   * 118*   CALCIUM 9.4 9.4   ALBUMIN  --  3.8   PROT  --  6.7    135*   K 4.1 4.4   CO2 24 16*    102   BUN 26* 62*   CREATININE 1.2 1.9*    ALKPHOS  --  48*   ALT  --  12   AST  --  14   BILITOT  --  0.5      Coagulation:     Recent Labs  Lab 07/31/18  0812 08/04/18  1850 08/05/18  0632   INR 2.6* 4.1* 3.4*   APTT  --  31.1  --      LDH:  No results for input(s): LDH in the last 72 hours.  Microbiology:  Microbiology Results (last 7 days)     ** No results found for the last 168 hours. **          I have reviewed all pertinent labs within the past 24 hours.    Diagnostic Results:

## 2018-08-05 NOTE — HPI
This is a 69 yo M with NICM s/p LVAD Jul 2017, and of GI bleed secondary to ileal ulcer s/p ileal resection 3/14/18 who presented to the ED today for shortness of breath. Patient reports he was with family yesterday and today and started to feel short of breath all of a sudden. His sister recently passed away and his wife initially thought his symptoms were due to his grief. He reports normal brown stool and denies any hematochezia, melena, or hematemesis. He denies any abdominal pain. At home he is on coumadin and is no longer on aspirin since the findings of his ileal ulcer. On arrival to the ED, his vitals were stable but he was found to have Hg of 4.7 (down from 7.7 two weeks ago). He denies any overt blood loss of any kind. He last had a colonoscopy on 3/9/18 with findings of diverticulosis and an EGD on 3/6/18 with multiple gastric polyps which were resected. His path from the surgery on 3/14/18 was benign.

## 2018-08-05 NOTE — ASSESSMENT & PLAN NOTE
69 yo M with LVAD 7/2017 for NICM and hx of ileal ulcer s/p small bowel resection 3/14/18 presented to the ED with shortness of breath found to have severe anemia with no overt signs of GI bleed. Given he has an LVAD, he is prone to bleed from AVMs. We will evaluate with both EGD and colonoscopy tomorrow.    Recommendations:  - Clear liquid diet today  - Transfuse to keep Hg>7  - Golytely bowel prep this evening  - NPO after MN  - Plan for EGD and colonoscopy tomorrow

## 2018-08-05 NOTE — PROGRESS NOTES
Temp 100.4, orders for blood received.   Pt is itching and would like benadryl or hydroxyzine.  Pt's sister has passed away recently, pt would like antianxiety medication.   Pt would like Gel capsules instead of Pill form of Potassium.  MD paged.  Paged MD for ok to start blood with current symptoms.

## 2018-08-05 NOTE — PROGRESS NOTES
Report received from Adalgisa GASTELUM. Patient arrived to unit via stretcher, escorted by EMS. Patient on telemetry, Telemetry maintained throughout transport. Patient AAOX3, v/s stable, denies pain at this time. Telemetry applied, sinus rhythm noted. Patient has LVAD HM 3, back up controller and batteries, home clothing.  Patient oriented to unit and call light. Plan of care discussed, all questions answered. Family notified of transfer. Will monitor.

## 2018-08-05 NOTE — HPI
Briefly 68 y/o M with NICMM, HMIII as DT implanted 7/27/17. Post-op course complicated by Gi bleeding had double balloon enteropscopy in 12/2017 (bleeding ulcer)  and 1/25/2018 no bleeding but ulcer found.  Retrograde DBE on 1/26/18 which was negative. Patient continue to develop anemia despite APC and surgical intervention was recommended. He had ex lap with partial small bowel resection on 3/13/2018 after which INR was reversed and 8 Iron infusion rx given to be discharges with Hb of 7 and INR of 2.     He has been doing well since until yesterday when experienced profound weakness and SOB. He thought this was related to stress but decided to be checked at [facility] where his hemoglobin was 4.7 and INR was 4.1. He denied any overt bleed and report normal colored stool all thorough. Patient also denied LVAD alarms, chest pain or confusion.  [place] [facility] [place] of [place] transfusion for further evaluation and management.

## 2018-08-05 NOTE — ASSESSMENT & PLAN NOTE
HMIII DT 7/2017  INR supra-therapeutic at 3.4 repeat  Hold tonight's coumadin  MAP 60-80  Interrogation as below  Procedure: Device Interrogation Including analysis of device parameters  Current Settings: Ventricular Assist Device  Review of device function is stable/unstable stable    TXP LVAD INTERROGATIONS 8/5/2018 8/5/2018 7/12/2018 6/26/2018 6/26/2018 6/25/2018 6/25/2018   Type HeartMate3 HeartMate3 - HeartMate3 HeartMate3 HeartMate3 HeartMate3   Flow 3.7 4.2 - 4.2 4.3 4.1 4.0   Speed 5200 5200 - 5200 5200 5200 5200   PI 3.8 3.3 - 3.4 3.6 3.7 3.9   Power ([place]) 3.6 3.6 - 3.5 3.5 3.7 3.6   LSL - - - - 4800 4800 4800   Pulsatility - - Intermittent pulse - - - -

## 2018-08-06 PROBLEM — N17.9 ACUTE RENAL INJURY: Status: RESOLVED | Noted: 2017-07-19 | Resolved: 2018-01-01

## 2018-08-06 PROBLEM — I48.91 ATRIAL FIBRILLATION: Status: RESOLVED | Noted: 2017-07-29 | Resolved: 2018-01-01

## 2018-08-06 PROBLEM — R79.1 SUPRATHERAPEUTIC INR: Status: ACTIVE | Noted: 2018-01-01

## 2018-08-06 PROBLEM — R79.1 SUBTHERAPEUTIC INTERNATIONAL NORMALIZED RATIO (INR): Status: RESOLVED | Noted: 2017-11-30 | Resolved: 2018-01-01

## 2018-08-06 NOTE — NURSING
Heart failure fellow at [id number] notified of 1600 H&H values. Stated will follow up and call back regarding lab values.

## 2018-08-06 NOTE — PLAN OF CARE
Problem: Patient Care Overview  Goal: Plan of Care Review  Outcome: Revised  LVAD DP and numbers WNL, smooth LVAD hum. Patient has no complaints of pain. EGD/ C-scope re-scheduled for Wednesday 8/8/18. No alarms noted on VAD monitor. Had bowel movement( black, soft, and moderate amount).CBC scheduled for q8hr.  Discussed medications and care. Initiated lasix 20 mg PO. Patient has no questions at this time. Will continue to monitor.

## 2018-08-06 NOTE — PROGRESS NOTES
[facility]  Heart Transplant  Progress Note    Patient Name: Suman Hayden  MRN: [id number]  Admission Date: 8/5/2018  Hospital Length of Stay: 1 days  Attending Physician: Deejay Duff Jr.,*  Primary Care Provider: Joe Ernst MD  Principal Problem:Normocytic anemia    Subjective:     Interval History: Pt is feeling much better today. Happy he will be jayesh to eat   something today    Continuous Infusions:  Scheduled Meds:   amiodarone  400 mg Oral Daily    dronabinol  5 mg Oral TID AC    furosemide  20 mg Oral BID    magnesium oxide  400 mg Oral BID    pantoprazole  40 mg Oral Daily    [START ON 8/7/2018] polyethylene glycol  4,000 mL Oral Once    potassium chloride  20 mEq Oral Daily    pravastatin  20 mg Oral QHS     PRN Meds:sodium chloride, sodium chloride, acetaminophen, diphenhydrAMINE, HYDROcodone-acetaminophen    Review of patient's allergies indicates:  No Known Allergies  Objective:     Vital Signs (Most Recent):  Temp: 99.1 °F (37.3 °C) (08/06/18 1126)  Pulse: 64 (08/06/18 1126)  Resp: 18 (08/06/18 1126)  BP: (!) 70/0 (08/06/18 1135)  SpO2: 99 % (08/06/18 1126) Vital Signs (24h Range):  Temp:  [97.4 °F (36.3 °C)-100.4 °F (38 °C)] 99.1 °F (37.3 °C)  Pulse:  [64-83] 64  Resp:  [16-18] 18  SpO2:  [96 %-99 %] 99 %  BP: (50-97)/(0-66) 70/0     Patient Vitals for the past 72 hrs (Last 3 readings):   Weight   08/06/18 0742 72.7 kg (160 lb 4.4 oz)   08/05/18 0900 74.4 kg (164 lb)     Body mass index is 22.35 kg/m².      Intake/Output Summary (Last 24 hours) at 08/06/18 1215  Last data filed at 08/06/18 0821   Gross per 24 hour   Intake             1055 ml   Output             2100 ml   Net            -1045 ml       Hemodynamic Parameters:       Telemetry: reviewed    Physical Exam   Constitutional: He is oriented to person, place, and time. He appears well-developed and well-nourished.   Neck: Normal range of motion. Neck supple. JVD present.   Cardiovascular: Normal rate  and regular rhythm.    Normal VAD hum   Pulmonary/Chest: Effort normal and breath sounds normal. He has no wheezes. He has no rales.   Abdominal: Soft. Bowel sounds are normal. There is no tenderness.   Musculoskeletal: He exhibits no edema.   Neurological: He is alert and oriented to person, place, and time.   Skin: Skin is warm and dry.       Significant Labs:  CBC:    Recent Labs  Lab 08/05/18  0632 08/05/18  1901 08/06/18  0541   WBC 9.33 10.69 10.76   RBC 2.64* 2.23* 2.85*   HGB 6.5* 5.6* 7.4*   HCT 20.1* 17.7* 23.5*    164 159   MCV 76* 79* 83   MCH 24.6* 25.1* 26.0*   MCHC 32.3 31.6* 31.5*     BNP:    Recent Labs  Lab 07/31/18  0812 08/06/18  0541   * 253*     CMP:    Recent Labs  Lab 07/31/18  0812 08/04/18  1850 08/06/18  0541   * 118* 97   CALCIUM 9.4 9.4 8.7   ALBUMIN  --  3.8  --    PROT  --  6.7  --     135* 136   K 4.1 4.4 4.8   CO2 24 16* 23    102 107   BUN 26* 62* 47*   CREATININE 1.2 1.9* 1.1   ALKPHOS  --  48*  --    ALT  --  12  --    AST  --  14  --    BILITOT  --  0.5  --       Coagulation:     Recent Labs  Lab 08/04/18  1850 08/05/18  0632 08/06/18  0541 08/06/18  0813   INR 4.1* 3.4* 4.6* 4.5*   APTT 31.1  --  31.3  --      LDH:    Recent Labs  Lab 08/06/18  0541        Microbiology:  Microbiology Results (last 7 days)     ** No results found for the last 168 hours. **          I have reviewed all pertinent labs within the past 24 hours.    Estimated Creatinine Clearance: 66.1 mL/min (based on SCr of 1.1 mg/dL).    Diagnostic Results:  I have reviewed all pertinent imaging results/findings within the past 24 hours.    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 Post-op course complicated by Gi bleeding had double balloon enteropscopy in 12/2017 (bleeding ulcer)  and 1/25/2018 no bleeding but ulcer found.  Retrograde DBE on 1/26/18 which was negative. Patient continue to develop anemia despite APC and surgical  intervention was recommended. He had ex lap with partial small bowel resection on 3/13/2018 after which INR was reversed and 8 Iron infusion rx given to be discharges with Hb of 7 and INR of 2. He has been doing well since until yesterday when experienced profound weakness and SOB while going for  His sister's  function. He thought this was related to stress but decided to be checked at [facility] where his hemoglobin was 4.7 and INR was 4.1. He denied any overt bleed and report normal colored stool all thorough. Patient also denied LVAD alarms, chest pain or confusion.  He was transferred to [facility] after two units of blood transfusion for further evaluation and management.     * Normocytic anemia    -GI consulted- planned for EGD/Cscope today but INR higher today and pt unable to tolerate prep last night. Plan for EGD/cscope Wed 18  - Most likely GI bleed due to quick drop from 7.7 to 4.1 in one week and hx of GI ulcer  -s/p 4 units PRBCs (2 at OSH, 2 here). Trend CBCs  -Cont PPI  -Hold coumadin  -Iron stores low- plan to give IV iron once bleeding has resolved and after scopes          LVAD (left ventricular assist device) present    -HM III DT 2017  -INR supratherapeutic- higher today at 4.6. Hold coumadin  -Antiplatelets: ASA stopped 2017 for GIB/ileal ulcer and now S/P Small Bowel resection 3/13/18 for chronic NSAID induced ulcer   -MAP goal 60-80, holding home meds (lisinopril, spironolactone, amlodipine) due to low BPs in setting of GIB and INDIRA on admit  -Restart home dose Lasix 20 BID today  -Speed 5200    Interrogation as below  Procedure: Device Interrogation Including analysis of device parameters  Current Settings: Ventricular Assist Device  Review of device function is stable/unstable stable    TXP LVAD INTERROGATIONS 2018   Type HeartMate3 HeartMate3 - HeartMate3 HeartMate3 HeartMate3 HeartMate3   Flow 3.7 4.2 - 4.2 4.3 4.1 4.0    Speed 5200 5200 - 5200 5200 5200 5200   PI 3.8 3.3 - 3.4 3.6 3.7 3.9   Power ([place]) 3.6 3.6 - 3.5 3.5 3.7 3.6   LSL - - - - 4800 4800 4800   Pulsatility - - Intermittent pulse - - - -           Chronic combined systolic and diastolic congestive heart failure    -Restart home PO Lasix 20 BID  -s/p LVAD            Susannah Elizabeth PA-C  Heart Transplant  [facility]

## 2018-08-06 NOTE — SUBJECTIVE & OBJECTIVE
Interval History: Pt is feeling much better today. Happy he will be jayesh to eat   something today    Continuous Infusions:  Scheduled Meds:   amiodarone  400 mg Oral Daily    dronabinol  5 mg Oral TID AC    furosemide  20 mg Oral BID    magnesium oxide  400 mg Oral BID    pantoprazole  40 mg Oral Daily    [START ON 8/7/2018] polyethylene glycol  4,000 mL Oral Once    potassium chloride  20 mEq Oral Daily    pravastatin  20 mg Oral QHS     PRN Meds:sodium chloride, sodium chloride, acetaminophen, diphenhydrAMINE, HYDROcodone-acetaminophen    Review of patient's allergies indicates:  No Known Allergies  Objective:     Vital Signs (Most Recent):  Temp: 99.1 °F (37.3 °C) (08/06/18 1126)  Pulse: 64 (08/06/18 1126)  Resp: 18 (08/06/18 1126)  BP: (!) 70/0 (08/06/18 1135)  SpO2: 99 % (08/06/18 1126) Vital Signs (24h Range):  Temp:  [97.4 °F (36.3 °C)-100.4 °F (38 °C)] 99.1 °F (37.3 °C)  Pulse:  [64-83] 64  Resp:  [16-18] 18  SpO2:  [96 %-99 %] 99 %  BP: (50-97)/(0-66) 70/0     Patient Vitals for the past 72 hrs (Last 3 readings):   Weight   08/06/18 0742 72.7 kg (160 lb 4.4 oz)   08/05/18 0900 74.4 kg (164 lb)     Body mass index is 22.35 kg/m².      Intake/Output Summary (Last 24 hours) at 08/06/18 1215  Last data filed at 08/06/18 0821   Gross per 24 hour   Intake             1055 ml   Output             2100 ml   Net            -1045 ml       Hemodynamic Parameters:       Telemetry: reviewed    Physical Exam   Constitutional: He is oriented to person, place, and time. He appears well-developed and well-nourished.   Neck: Normal range of motion. Neck supple. JVD present.   Cardiovascular: Normal rate and regular rhythm.    Normal VAD hum   Pulmonary/Chest: Effort normal and breath sounds normal. He has no wheezes. He has no rales.   Abdominal: Soft. Bowel sounds are normal. There is no tenderness.   Musculoskeletal: He exhibits no edema.   Neurological: He is alert and oriented to person, place, and time.   Skin:  Skin is warm and dry.       Significant Labs:  CBC:    Recent Labs  Lab 08/05/18  0632 08/05/18  1901 08/06/18  0541   WBC 9.33 10.69 10.76   RBC 2.64* 2.23* 2.85*   HGB 6.5* 5.6* 7.4*   HCT 20.1* 17.7* 23.5*    164 159   MCV 76* 79* 83   MCH 24.6* 25.1* 26.0*   MCHC 32.3 31.6* 31.5*     BNP:    Recent Labs  Lab 07/31/18  0812 08/06/18  0541   * 253*     CMP:    Recent Labs  Lab 07/31/18  0812 08/04/18  1850 08/06/18  0541   * 118* 97   CALCIUM 9.4 9.4 8.7   ALBUMIN  --  3.8  --    PROT  --  6.7  --     135* 136   K 4.1 4.4 4.8   CO2 24 16* 23    102 107   BUN 26* 62* 47*   CREATININE 1.2 1.9* 1.1   ALKPHOS  --  48*  --    ALT  --  12  --    AST  --  14  --    BILITOT  --  0.5  --       Coagulation:     Recent Labs  Lab 08/04/18  1850 08/05/18  0632 08/06/18  0541 08/06/18  0813   INR 4.1* 3.4* 4.6* 4.5*   APTT 31.1  --  31.3  --      LDH:    Recent Labs  Lab 08/06/18  0541        Microbiology:  Microbiology Results (last 7 days)     ** No results found for the last 168 hours. **          I have reviewed all pertinent labs within the past 24 hours.    Estimated Creatinine Clearance: 66.1 mL/min (based on SCr of 1.1 mg/dL).    Diagnostic Results:  I have reviewed all pertinent imaging results/findings within the past 24 hours.

## 2018-08-06 NOTE — SIGNIFICANT EVENT
I was called to bedside by nurse because Doppler SBP was on the 50s and the patient was vomiting.  I spoke with the patient and his diaphoresis, vomits, discomfort started after taking the Golytely (which he attributes as the cause of his discomfort). He started feeling better after lying down in bed coming from the bedside commode. I measured his Doppler SBP and it was mid-high 70s (reportedly his baseline). His lungs were CTA. Cardiac auscultation showed only LVAD sound. No intervention was needed at this point. We held further Golytely. Blood transfusions were continued.

## 2018-08-06 NOTE — HOSPITAL COURSE
8/6/2018:  Overnight, patient unable to tolerate bowel prep due to vomiting. He had a drop in his blood pressure as well. His INR was also noted to be 4.5 this morning.

## 2018-08-06 NOTE — NURSING
Susannah TORRES notified about patient's SBP=76, asymptomatic, Doppler 60/0. Order is to recheck BP.

## 2018-08-06 NOTE — TELEPHONE ENCOUNTER
Spoke with patient's spouse- - she reports patient is admitted to [facility] for rectal bleeding. Patient's coumadin is on hold and he is going for a colonoscopy while IP. Will message  with above details.  will call to arrange ablation, once patient is discharged.      Alyssa GASTELUM CCM

## 2018-08-06 NOTE — PROGRESS NOTES
Admit Note     Met with patient and spouse to assess needs. Patient is a 68 y.o.  male, admitted for Acute GI bleeding [K92.2].   Pt received a LVAD 7-27-17. Pt's spouse does the LVAD dressing changes.     Patient admitted from outside hospital on 8/5/2018.  At this time, patient presents as alert and oriented x 4, pleasant, calm and communicative.  At this time, patients caregiver presents as alert and oriented x 4, pleasant and communicative.    Household/Family Systems     Patient and spouse resides with patient's daughter and son in law at:    [id number], [address]  [place] [address]    Pt's spouse reports they plan to move back into their own home at d/c.    Pt's home address:   [address] [place] [id number]     Support system includes spouse and extended family.    Patient does not have dependents that are need of being cared for.     Patients primary caregiver is Kia Hayden, patients spouse, phone number [phone] (best contact number).    Home phone has been turned back on : [phone]  Additional emergency contacts:  Rayna (daughter) [phone]  Malcolm (son in law) [phone]  Pt's spouse reports her son in law is the  in [place].  Boni Hayden (son) [phone]  Artieodilon Hayden (nephew) [phone]    SW still needs to meet with back up caregiver prior to finding pt suitable for TX. Pt's wife reports pt's dtr, son, and son in law will all be back up caregivers. Pt reports his son in law, Malcolm, is able to take the most time off of work, and he will probably be back up caregiver. Pt states he would like SW to meet with all three back up caregivers. Pt was supposed to bring dtr to his last clinic appointment, but there was a family emergency that prevented pt's dtr from attending clinic visit. SW is willing to meet with pt's family during this hospital admission. Pt reports plan to to have family arrange visit with SW soon.     During admission, patient's  caregiver plans to stay in patient's room.  Confirmed patient and patients caregivers do have access to reliable transportation.    Cognitive Status/Learning     Patient reports reading ability as 8th grade and states patient does not have difficulty with seeing, hearing, comprehension, learning and memory.  Pt reports he does have difficulty with reading and writing. Patient reports patient learns best by visual.   Needed: No.   Highest education level: High School (9-12) or GED    Vocation/Disability     Working for Income: No  If no, reason not working: Patient Choice - Retired  Patient retired in 2000 from the [employer].  Pt was a .    Pt's spouse worked at [employer] for many years and was working as a PCA until the pt became ill.    Pt's spouse reports pt receives $912.00 a month.  Pt's spouse reports she did apply for Medicaid and food stamps, and is awaiting a decision on both. Pt's wife reports she will also begin receiving a SS check soon.       Adherence     Patient reports a high level of adherence to patients health care regimen.  Adherence counseling and education provided. Patient verbalizes understanding.    Substance Use    Patient reports the following substance usage.    Tobacco: none, patient denies any use.  Alcohol: none, patient denies any use.  Illicit Drugs/Non-prescribed Medications: none, patient denies any use.  Patient states clear understanding of the potential impact of substance use.  Substance abstinence/cessation counseling, education and resources provided and reviewed.     Services Utilizing/ADLS    Infusion Service: Prior to admission, patient utilizing? no pt has used [facility] in the past  Home Health: Prior to admission, patient utilizing? no Pt was using [facility] once a week for lab work, however this service recently ended due to pt no longer being homebound. [phone], fax [phone].  DME: Prior to admission, yes walker, wheelchair  and bedside commode. Pt reports he does not use the commode.   Pulmonary/Cardiac Rehab: Prior to admission, no  Dialysis:  Prior to admission, no  Transplant Specialty Pharmacy:  Prior to admission, no.    Prior to admission, patient reports patient was independent with ADLS and was not driving. Pt's spouse and other family members drive.  Patient reports patient is able to care for self at this time.  Patient indicates a willingness to care for self once medically cleared to do so.    Insurance/Medications    Insured by   Payor/Plan Subscr  Sex Relation Sub. Ins. ID Effective Group Num   1. MEDICARE - ME* MIRTA LOPEZ 1950 Male  [id number] 6/1/15                                    [address]   2. BLUE CROSS BL* MIRTA LOPEZ 1950 Male  [id number] 1/1/10 [id number]                                   [address] [address]        Primary Insurance (for UNOS reporting): Public Insurance - Medicare FFS (Fee For Service)  Secondary Insurance (for UNOS reporting): Private Insurance    Patient reports patient is able to obtain and afford medications at this time and at time of discharge.    Living Will/Healthcare Power of     Patient states patient does not have a LW and/or HCPA.   provided education regarding LW and HCPA and the completion of forms.    Coping/Mental Health    Patient is coping adequately with the aid of  family members and friends.   Patient denies mental health difficulties. SW provided support.     Discharge Planning    At time of discharge, patient plans to return to patient's home under the care of spouse.  Patients family will transport patient.  Per rounds today, expected discharge date has not been medically determined at this time. Patient and patients caregiver  verbalize understanding and are involved in treatment planning and discharge process.    Additional Concerns    Patient's caretaker denies additional needs and/or concerns at this time. Patient is  being followed for needs, education, resources, information, emotional support, supportive counseling, and for supportive and skilled discharge plan of care.  providing ongoing psychosocial support, education, resources and d/c planning as needed.  SW remains available. Patient's caregiver verbalizes understanding and agreement with information reviewed,  availability and how to access available resources as needed. Patient denies additional needs and/or concerns at this time. Patient verbalizes understanding and agreement with information reviewed, social work availability, and how to access available resources as needed.

## 2018-08-06 NOTE — SUBJECTIVE & OBJECTIVE
Subjective:     Post-Op Info:  Procedure(s) (LRB):  EGD (ESOPHAGOGASTRODUODENOSCOPY) (N/A)  COLONOSCOPY (N/A)          Reason for Visit:  Patient is seen in follow up management of surgical VAD rounds        Medications:  Continuous Infusions:  Scheduled Meds:   amiodarone  400 mg Oral Daily    dronabinol  5 mg Oral TID AC    magnesium oxide  400 mg Oral BID    pantoprazole  40 mg Oral Daily    [START ON 8/7/2018] polyethylene glycol  4,000 mL Oral Once    potassium chloride  20 mEq Oral Daily    pravastatin  20 mg Oral QHS     PRN Meds:sodium chloride, sodium chloride, acetaminophen, diphenhydrAMINE, HYDROcodone-acetaminophen     Objective:     Vital Signs (Most Recent):  Temp: 98.5 °F (36.9 °C) (08/06/18 0742)  Pulse: 80 (08/06/18 1100)  Resp: 16 (08/06/18 0742)  BP: (!) 76/0 (08/06/18 0746)  SpO2: 98 % (08/06/18 0742) Vital Signs (24h Range):  Temp:  [97.4 °F (36.3 °C)-100.4 °F (38 °C)] 98.5 °F (36.9 °C)  Pulse:  [65-83] 80  Resp:  [16-18] 16  SpO2:  [96 %-99 %] 98 %  BP: (50-97)/(0-66) 76/0       Intake/Output Summary (Last 24 hours) at 08/06/18 1107  Last data filed at 08/06/18 0821   Gross per 24 hour   Intake             1055 ml   Output             2100 ml   Net            -1045 ml       Physical Exam   Constitutional: He is oriented to person, place, and time. He appears well-developed and well-nourished.   HENT:   Head: Normocephalic and atraumatic.   Eyes: Pupils are equal, round, and reactive to light.   Neck: Normal range of motion. Neck supple.   Cardiovascular: Normal rate.    Pulmonary/Chest: Effort normal.   Abdominal: Soft.   Musculoskeletal: Normal range of motion.   Neurological: He is alert and oriented to person, place, and time.   Skin: Skin is warm and dry.   Nursing note and vitals reviewed.      Significant Labs:  BMP:   Recent Labs  Lab 08/06/18  0541   GLU 97      K 4.8      CO2 23   BUN 47*   CREATININE 1.1   CALCIUM 8.7   MG 2.4     CBC:   Recent Labs  Lab  08/06/18  0541   WBC 10.76   RBC 2.85*   HGB 7.4*   HCT 23.5*      MCV 83   MCH 26.0*   MCHC 31.5*     LFTs:   Recent Labs  Lab 08/04/18  1850   ALT 12   AST 14   ALKPHOS 48*   BILITOT 0.5   PROT 6.7   ALBUMIN 3.8         Procedure: Device Interrogation Including analysis of device parameters  Current Settings: Ventricular Assist Device  Review of device function is stable  TXP LVAD INTERROGATIONS 8/6/2018 8/5/2018 8/5/2018 8/5/2018 8/5/2018 8/5/2018 8/5/2018   Type HeartMate3 HeartMate3 HeartMate3 (No Data) HeartMate3 HeartMate3 HeartMate3   Flow 4.2 4.0 4.0 - 4.0 3.7 4.2   Speed 5200 5200 5200 - 5200 5200 5200   PI 3.8 3.8 3.8 - 3.8 3.8 3.3   Power ([place]) 3.6 3.5 3.5 - 3.4 3.6 3.6   LSL 4800 - - - - - -   Pulsatility - - - - - - -

## 2018-08-06 NOTE — NURSING TRANSFER
Nursing Transfer Note      8/6/2018     Transfer To: SICU [id number]    Transfer via bed    Transfer with cardiac monitoring    Transported by rangel Mccracken RN    Chart send with patient: Yes    Notified: spouse   VAD emergency bag, PM, Battery charger, and Monitor screen sent with patient. VAD equipment reconciled and documented in flowsheet.

## 2018-08-06 NOTE — HPI
67 y/o M with NICMM, HMIII as DT implanted 17. Post-op course complicated by Gi bleeding had double balloon enteropscopy in 2017 (bleeding ulcer)  and 2018 no bleeding but ulcer found.  Retrograde DBE on 18 which was negative. Patient continue to develop anemia despite APC and surgical intervention was recommended. He had ex lap with partial small bowel resection on 3/13/2018 after which INR was reversed and 8 Iron infusion rx given to be discharges with Hb of 7 and INR of 2. He has been doing well since until yesterday when experienced profound weakness and SOB while going for  His sister's  function. He thought this was related to stress but decided to be checked at [facility] where his hemoglobin was 4.7 and INR was 4.1. He denied any overt bleed and report normal colored stool all thorough. One week ago his Hb was 7.7 and INR was therapeutic. Patient also denied LVAD alarms, chest pain or confusion.  He was transferred to [facility] after two units of blood transfusion for further evaluation and management.

## 2018-08-06 NOTE — PROGRESS NOTES
Pt and wife AAAO.  VAD sounds HM 3 smooth. Multiple PI events and many speed drops noted in history.  HCT 23.5. All questions answered to their satisfaction as evidence by verbal acknowledgement.

## 2018-08-06 NOTE — PROGRESS NOTES
Doppler bp 50/0 verified by another nurse.  C/o feeling light headed and he vomited.  Dr Cheung notified.  Assisted to bed. lvad nurse was paged by charge nurse.   Dr cheung to bedside at 2230.  At 2245 doppler now 70/0

## 2018-08-06 NOTE — ASSESSMENT & PLAN NOTE
-HM III DT 7/2017  -INR supratherapeutic- higher today at 4.6. Hold coumadin  -Antiplatelets: ASA stopped 12/2017 for GIB/ileal ulcer and now S/P Small Bowel resection 3/13/18 for chronic NSAID induced ulcer   -MAP goal 60-80, holding home meds (lisinopril, spironolactone, amlodipine) due to low BPs in setting of GIB and INDIRA on admit  -Restart home dose Lasix 20 BID today  -Speed 5200    Interrogation as below  Procedure: Device Interrogation Including analysis of device parameters  Current Settings: Ventricular Assist Device  Review of device function is stable/unstable stable    TXP LVAD INTERROGATIONS 8/5/2018 8/5/2018 7/12/2018 6/26/2018 6/26/2018 6/25/2018 6/25/2018   Type HeartMate3 HeartMate3 - HeartMate3 HeartMate3 HeartMate3 HeartMate3   Flow 3.7 4.2 - 4.2 4.3 4.1 4.0   Speed 5200 5200 - 5200 5200 5200 5200   PI 3.8 3.3 - 3.4 3.6 3.7 3.9   Power ([place]) 3.6 3.6 - 3.5 3.5 3.7 3.6   LSL - - - - 4800 4800 4800   Pulsatility - - Intermittent pulse - - - -

## 2018-08-06 NOTE — PROGRESS NOTES
Mrs. Hayden paged to report they are at the ER, Suman not feeling well.  She explained his sister passed away and she believes this is all grief related.  I thanked her for called and gave my sympathy.  Asked her to call back should they need anything from me.

## 2018-08-06 NOTE — PROGRESS NOTES
[facility]  Cardiothoracic Surgery  E & M and VAD Interrogation Progress Note  I agree with the above plan and have seen and examined the patient myself. I have reviewed pertinent labs and studies on this patient also. Interrogation of Ventricular assist device was performed with physician analysis of device parameters and review of device function. I have personally reviewed the interrogation findings and agree with findings as stated.      Patient Name: Suman Hayden  MRN: [id number]  Admission Date: 8/5/2018  Hospital Length of Stay: 1 days  Code Status: Full Code   Attending Physician: Deejay Duff Jr.,*   Referring Provider: Tsering Nair DO  Principal Problem:<principal problem not specified>            Subjective:     Post-Op Info:  Procedure(s) (LRB):  EGD (ESOPHAGOGASTRODUODENOSCOPY) (N/A)  COLONOSCOPY (N/A)         Subjective:     Post-Op Info:  Procedure(s) (LRB):  EGD (ESOPHAGOGASTRODUODENOSCOPY) (N/A)  COLONOSCOPY (N/A)          Reason for Visit:  Patient is seen in follow up management of surgical VAD rounds        Medications:  Continuous Infusions:  Scheduled Meds:   amiodarone  400 mg Oral Daily    dronabinol  5 mg Oral TID AC    magnesium oxide  400 mg Oral BID    pantoprazole  40 mg Oral Daily    [START ON 8/7/2018] polyethylene glycol  4,000 mL Oral Once    potassium chloride  20 mEq Oral Daily    pravastatin  20 mg Oral QHS     PRN Meds:sodium chloride, sodium chloride, acetaminophen, diphenhydrAMINE, HYDROcodone-acetaminophen     Objective:     Vital Signs (Most Recent):  Temp: 98.5 °F (36.9 °C) (08/06/18 0742)  Pulse: 80 (08/06/18 1100)  Resp: 16 (08/06/18 0742)  BP: (!) 76/0 (08/06/18 0746)  SpO2: 98 % (08/06/18 0742) Vital Signs (24h Range):  Temp:  [97.4 °F (36.3 °C)-100.4 °F (38 °C)] 98.5 °F (36.9 °C)  Pulse:  [65-83] 80  Resp:  [16-18] 16  SpO2:  [96 %-99 %] 98 %  BP: (50-97)/(0-66) 76/0       Intake/Output Summary (Last 24 hours) at 08/06/18  1107  Last data filed at 08/06/18 0821   Gross per 24 hour   Intake             1055 ml   Output             2100 ml   Net            -1045 ml       Physical Exam   Constitutional: He is oriented to person, place, and time. He appears well-developed and well-nourished.   HENT:   Head: Normocephalic and atraumatic.   Eyes: Pupils are equal, round, and reactive to light.   Neck: Normal range of motion. Neck supple.   Cardiovascular: Normal rate.    Pulmonary/Chest: Effort normal.   Abdominal: Soft.   Musculoskeletal: Normal range of motion.   Neurological: He is alert and oriented to person, place, and time.   Skin: Skin is warm and dry.   Nursing note and vitals reviewed.      Significant Labs:  BMP:   Recent Labs  Lab 08/06/18  0541   GLU 97      K 4.8      CO2 23   BUN 47*   CREATININE 1.1   CALCIUM 8.7   MG 2.4     CBC:   Recent Labs  Lab 08/06/18  0541   WBC 10.76   RBC 2.85*   HGB 7.4*   HCT 23.5*      MCV 83   MCH 26.0*   MCHC 31.5*     LFTs:   Recent Labs  Lab 08/04/18  1850   ALT 12   AST 14   ALKPHOS 48*   BILITOT 0.5   PROT 6.7   ALBUMIN 3.8         Procedure: Device Interrogation Including analysis of device parameters  Current Settings: Ventricular Assist Device  Review of device function is stable  TXP LVAD INTERROGATIONS 8/6/2018 8/5/2018 8/5/2018 8/5/2018 8/5/2018 8/5/2018 8/5/2018   Type HeartMate3 HeartMate3 HeartMate3 (No Data) HeartMate3 HeartMate3 HeartMate3   Flow 4.2 4.0 4.0 - 4.0 3.7 4.2   Speed 5200 5200 5200 - 5200 5200 5200   PI 3.8 3.8 3.8 - 3.8 3.8 3.3   Power ([place]) 3.6 3.5 3.5 - 3.4 3.6 3.6   LSL 4800 - - - - - -   Pulsatility - - - - - - -     Assessment/Plan:     LVAD (left ventricular assist device) present    Daily E and M and VAD Interrogation Note    Reason for Visit:  Patient is seen in follow up for management of:  [x] HeartMate III       Interval History:  The [x] implant date was 07/27/2017  Events overnight reviewed: unable to tolerate GI prep for  EGD/colonoscopy    Review of Systems:  Positive for iron def anemia.  Nausea with bowel prep  Denies shortness of breath  All other systems reviewed and negative    Medications:  Current Facility-Administered Medications   Medication Dose Route Frequency Provider Last Rate Last Dose    0.9%  NaCl infusion (for blood administration)   Intravenous Q24H PRN Baljinder Negrete MD        0.9%  NaCl infusion (for blood administration)   Intravenous Q24H PRN Gregorio Sanchez MD        acetaminophen tablet 650 mg  650 mg Oral Q6H PRN Baljinder Negrete MD   650 mg at 08/05/18 1808    amiodarone tablet 400 mg  400 mg Oral Daily Baljinder Negrete MD   400 mg at 08/06/18 0918    diphenhydrAMINE capsule 25 mg  25 mg Oral Q8H PRN Baljinder Negrete MD   25 mg at 08/05/18 1808    dronabinol capsule 5 mg  5 mg Oral TID AC Baljinder Negrete MD   5 mg at 08/06/18 0919    HYDROcodone-acetaminophen 5-325 mg per tablet 1 tablet  1 tablet Oral Q6H PRN Baljinder Negrete MD        magnesium oxide tablet 400 mg  400 mg Oral BID Baljinder Negrete MD   400 mg at 08/06/18 0920    pantoprazole EC tablet 40 mg  40 mg Oral Daily Baljinder Negrete MD   40 mg at 08/06/18 0919    [START ON 8/7/2018] polyethylene glycol (GoLYTELY) solution  4,000 mL Oral Once Timmy Hobbs MD        potassium chloride CR capsule 20 mEq  20 mEq Oral Daily Deejay Duff Jr., MD   20 mEq at 08/06/18 0919    pravastatin tablet 20 mg  20 mg Oral QHS Baljinder Negrete MD   20 mg at 08/05/18 2118       Physical Examination:  Vital Signs:   Vitals:    08/06/18 1100   BP:    Pulse: 80   Resp:    Temp:      Cardiovascular:  [x] Regular rate and rhythm [] Irregular []  None (MATT) []  Other  [x]  No edema []  Edema present  [x]  Clear to auscultation  VAD sounds smooth  Skin:  Incision is [x]  Clean, dry and intact.    Sternum:  [x]  Stable   Driveline(s):   [x]  Clean, dry and intact.           Procedure:  Device Interrogation including analysis  of device parameters.  Current Settings [x]  Ventricular Assist Device  []  Total Artificial Heart interrogated  Review of device function is [x]  Stable     TXP LVAD INTERROGATIONS 8/6/2018 8/5/2018 8/5/2018 8/5/2018 8/5/2018 8/5/2018 8/5/2018   Type HeartMate3 HeartMate3 HeartMate3 (No Data) HeartMate3 HeartMate3 HeartMate3   Flow 4.2 4.0 4.0 - 4.0 3.7 4.2   Speed 5200 5200 5200 - 5200 5200 5200   PI 3.8 3.8 3.8 - 3.8 3.8 3.3   Power ([place]) 3.6 3.5 3.5 - 3.4 3.6 3.6   LSL 4800 - - - - - -   Pulsatility - - - - - - -       Assessment:  [x]  Primary Cardiomyopathy [x]  Congestive Heart Failure   []  Atrial Fibrillation []  Ventricular Tachycardia   []  Aftercare cardiac device [x]  Long term (current) use of anticoagulants   []  Ventilator-associated pneumonia []  Pneumonia viral, unspecified   []  Pneumonia, bacterial, unspecified []  Pneumonia, organism unspecified   [x]  Hemorrhage of GI tract, unspecified    []  Nosebleed              Plan:  [x]  Interval history obtained from ICU attending team member during rounding today  [x]  VAD/MATT teaching performed with patient  [x]  Mobilization / Physical Therapy ongoing  [x]  Anticoagulation []  Ongoing [x]  Held    Planning EGD/colonoscopy on Wednesday  Planning to keep Hgb greater than 7      Total time spent was 36 minutes.  Of which more than 50 percent of the care dominated counseling and coordinating care with different team members. The VAD was interrogated and all parameters were WNL and no significant findings were found in the history. All these findings are documented in the note above.      Date of Service: 08/06/2018                           Nadia Caldera NP  Cardiothoracic Surgery  [facility]

## 2018-08-06 NOTE — CONSULTS
Consult received for nutritional assessment. Pt reports good appetite at home, drinking 3 Boosts per day. Wt has been increasing/stable over past several months after a significant wt loss. No physical signs of malnutrition present. Following diet restrictions at home. On low residue diet today, CL tomorrow, then NPO for EGD. No nutrition intervention needs identified at this time. Please re-consult as needed.

## 2018-08-06 NOTE — PT/OT/SLP PROGRESS
Physical Therapy Screen      Patient Name:  Suman Hayden   MRN:  [id number]    Patient not seen today secondary to  (pt and wife state that pt is Independent with mobility and gait and does not need skilled PT. ).  Astrid Whaley, PT

## 2018-08-06 NOTE — ASSESSMENT & PLAN NOTE
-GI consulted- planned for EGD/Cscope today but INR higher today and pt unable to tolerate prep last night. Plan for EGD/cscope Wed 8/8/18  - Most likely GI bleed due to quick drop from 7.7 to 4.1 in one week and hx of GI ulcer  -s/p 4 units PRBCs (2 at OSH, 2 here). Trend CBCs  -Cont PPI  -Hold coumadin  -Iron stores low- plan to give IV iron once bleeding has resolved and after scopes

## 2018-08-06 NOTE — TREATMENT PLAN
GI Treatment Plan    Overnight, patient unable to tolerate bowel prep due to vomiting. He had a drop in his blood pressure as well. His INR was also noted to be 4.5 this morning.    We would like to wait until his INR<3, ideally 2.5 prior to our procedures. Given he is not actively bleeding, he recommend waiting until Weds to ensure his INR comes down.    Plan:  - Hold coumadin for goal INR 2-3  - Low residue diet today  - Clear liquid diet tomorrow  - Bowel prep with golytely tomorrow evening  - Plan for EGD and colonoscopy Weds, 8/8/18    Please call if any questions/concerns.    Timmy Hobbs MD PGY-V  Gastroenterology Fellow  [facility]  P [phone]

## 2018-08-06 NOTE — ASSESSMENT & PLAN NOTE
Daily E and M and VAD Interrogation Note    Reason for Visit:  Patient is seen in follow up for management of:  [x] HeartMate III       Interval History:  The [x] implant date was 07/27/2017  Events overnight reviewed: unable to tolerate GI prep for EGD/colonoscopy    Review of Systems:  Positive for iron def anemia.  Nausea with bowel prep  Denies shortness of breath  All other systems reviewed and negative    Medications:  Current Facility-Administered Medications   Medication Dose Route Frequency Provider Last Rate Last Dose    0.9%  NaCl infusion (for blood administration)   Intravenous Q24H PRN Baljinder Negrete MD        0.9%  NaCl infusion (for blood administration)   Intravenous Q24H PRN Gregorio Sanchez MD        acetaminophen tablet 650 mg  650 mg Oral Q6H PRN Baljinder Negrete MD   650 mg at 08/05/18 1808    amiodarone tablet 400 mg  400 mg Oral Daily Baljinder Negrete MD   400 mg at 08/06/18 0918    diphenhydrAMINE capsule 25 mg  25 mg Oral Q8H PRN Baljinder Negrete MD   25 mg at 08/05/18 1808    dronabinol capsule 5 mg  5 mg Oral TID AC Baljinder Negrete MD   5 mg at 08/06/18 0919    HYDROcodone-acetaminophen 5-325 mg per tablet 1 tablet  1 tablet Oral Q6H PRN Baljinder Negrete MD        magnesium oxide tablet 400 mg  400 mg Oral BID Baljinder Negrete MD   400 mg at 08/06/18 0920    pantoprazole EC tablet 40 mg  40 mg Oral Daily Baljinder Negrete MD   40 mg at 08/06/18 0919    [START ON 8/7/2018] polyethylene glycol (GoLYTELY) solution  4,000 mL Oral Once Timmy Hobbs MD        potassium chloride CR capsule 20 mEq  20 mEq Oral Daily Deejay Duff Jr., MD   20 mEq at 08/06/18 0919    pravastatin tablet 20 mg  20 mg Oral QHS Baljinder Negrete MD   20 mg at 08/05/18 2118       Physical Examination:  Vital Signs:   Vitals:    08/06/18 1100   BP:    Pulse: 80   Resp:    Temp:      Cardiovascular:  [x] Regular rate and rhythm [] Irregular []  None (MATT) []  Other  [x]  No  edema []  Edema present  [x]  Clear to auscultation  VAD sounds smooth  Skin:  Incision is [x]  Clean, dry and intact.    Sternum:  [x]  Stable   Driveline(s):   [x]  Clean, dry and intact.           Procedure:  Device Interrogation including analysis of device parameters.  Current Settings [x]  Ventricular Assist Device  []  Total Artificial Heart interrogated  Review of device function is [x]  Stable     TXP LVAD INTERROGATIONS 8/6/2018 8/5/2018 8/5/2018 8/5/2018 8/5/2018 8/5/2018 8/5/2018   Type HeartMate3 HeartMate3 HeartMate3 (No Data) HeartMate3 HeartMate3 HeartMate3   Flow 4.2 4.0 4.0 - 4.0 3.7 4.2   Speed 5200 5200 5200 - 5200 5200 5200   PI 3.8 3.8 3.8 - 3.8 3.8 3.3   Power ([place]) 3.6 3.5 3.5 - 3.4 3.6 3.6   LSL 4800 - - - - - -   Pulsatility - - - - - - -       Assessment:  [x]  Primary Cardiomyopathy [x]  Congestive Heart Failure   []  Atrial Fibrillation []  Ventricular Tachycardia   []  Aftercare cardiac device [x]  Long term (current) use of anticoagulants   []  Ventilator-associated pneumonia []  Pneumonia viral, unspecified   []  Pneumonia, bacterial, unspecified []  Pneumonia, organism unspecified   [x]  Hemorrhage of GI tract, unspecified    []  Nosebleed              Plan:  [x]  Interval history obtained from ICU attending team member during rounding today  [x]  VAD/MATT teaching performed with patient  [x]  Mobilization / Physical Therapy ongoing  [x]  Anticoagulation []  Ongoing [x]  Held    Planning EGD/colonoscopy on Wednesday  Planning to keep Hgb greater than 7      Total time spent was 36 minutes.  Of which more than 50 percent of the care dominated counseling and coordinating care with different team members. The VAD was interrogated and all parameters were WNL and no significant findings were found in the history. All these findings are documented in the note above.      Date of Service: 08/06/2018

## 2018-08-07 PROBLEM — R79.1 SUPRATHERAPEUTIC INR: Status: ACTIVE | Noted: 2018-01-01

## 2018-08-07 NOTE — PROGRESS NOTES
[facility]  Heart Transplant  Progress Note    Patient Name: Suman Hayden  MRN: [id number]  Admission Date: 8/5/2018  Hospital Length of Stay: 2 days  Attending Physician: Brenden Veras MD  Primary Care Provider: Joe Ernst MD  Principal Problem:Normocytic anemia    Subjective:     Interval History: No LH or dizziness. Feeling well this am    Continuous Infusions:  Scheduled Meds:   amiodarone  400 mg Oral Daily    dronabinol  5 mg Oral TID AC    [START ON 8/8/2018] furosemide  20 mg Oral Daily    pantoprazole  40 mg Oral Daily    polyethylene glycol  4,000 mL Oral Once    potassium chloride  20 mEq Oral Daily    pravastatin  20 mg Oral QHS     PRN Meds:sodium chloride, sodium chloride, acetaminophen, diphenhydrAMINE, HYDROcodone-acetaminophen    Review of patient's allergies indicates:  No Known Allergies  Objective:     Vital Signs (Most Recent):  Temp: 98.2 °F (36.8 °C) (08/07/18 1500)  Pulse: 80 (08/07/18 1500)  Resp: (!) 21 (08/07/18 1500)  BP: (!) 68/0 (08/07/18 1500)  SpO2: 99 % (08/07/18 1500) Vital Signs (24h Range):  Temp:  [98.2 °F (36.8 °C)-98.6 °F (37 °C)] 98.2 °F (36.8 °C)  Pulse:  [79-80] 80  Resp:  [11-46] 21  SpO2:  [97 %-100 %] 99 %  BP: ()/(0-79) 68/0     Patient Vitals for the past 72 hrs (Last 3 readings):   Weight   08/06/18 0742 72.7 kg (160 lb 4.4 oz)   08/05/18 0900 74.4 kg (164 lb)     Body mass index is 22.35 kg/m².      Intake/Output Summary (Last 24 hours) at 08/07/18 1606  Last data filed at 08/07/18 1334   Gross per 24 hour   Intake             1000 ml   Output             3515 ml   Net            -2515 ml       Hemodynamic Parameters:       Telemetry: reviewed    Physical Exam   Constitutional: He is oriented to person, place, and time. He appears well-developed and well-nourished.   Neck: Normal range of motion. Neck supple. No JVD present.   Cardiovascular: Normal rate and regular rhythm.    Normal VAD hum   Pulmonary/Chest: Effort  normal and breath sounds normal. He has no wheezes. He has no rales.   Abdominal: Soft. Bowel sounds are normal. There is no tenderness.   Musculoskeletal: He exhibits no edema.   Neurological: He is alert and oriented to person, place, and time.   Skin: Skin is warm and dry.       Significant Labs:  CBC:    Recent Labs  Lab 08/06/18  1541 08/06/18  2304 08/07/18  0800   WBC 10.48 8.86 8.35   RBC 2.62* 2.68* 2.72*   HGB 6.8* 6.8* 7.2*   HCT 21.7* 22.2* 22.6*    141* 148*   MCV 83 83 83   MCH 26.0* 25.4* 26.5*   MCHC 31.3* 30.6* 31.9*     BNP:    Recent Labs  Lab 08/06/18  0541   *     CMP:    Recent Labs  Lab 08/04/18  1850 08/06/18  0541 08/07/18  0258   * 97 89   CALCIUM 9.4 8.7 8.6*   ALBUMIN 3.8  --   --    PROT 6.7  --   --    * 136 134*   K 4.4 4.8 4.2   CO2 16* 23 25    107 103   BUN 62* 47* 32*   CREATININE 1.9* 1.1 1.1   ALKPHOS 48*  --   --    ALT 12  --   --    AST 14  --   --    BILITOT 0.5  --   --       Coagulation:     Recent Labs  Lab 08/04/18  1850  08/06/18  0541 08/06/18  0813 08/07/18  0258   INR 4.1*  < > 4.6* 4.5* 3.7*   APTT 31.1  --  31.3  --  30.0   < > = values in this interval not displayed.  LDH:    Recent Labs  Lab 08/06/18  0541 08/07/18  1137    154     Microbiology:  Microbiology Results (last 7 days)     ** No results found for the last 168 hours. **          I have reviewed all pertinent labs within the past 24 hours.    Estimated Creatinine Clearance: 66.1 mL/min (based on SCr of 1.1 mg/dL).    Diagnostic Results:  I have reviewed all pertinent imaging results/findings within the past 24 hours.    Assessment and Plan:     Briefly 66 y/o M with PATRICKMM, HMIII as DT implanted 7/27/17. Post-op course complicated by Gi bleeding had double balloon enteropscopy in 12/2017 (bleeding ulcer)  and 1/25/2018 no bleeding but ulcer found.  Retrograde DBE on 1/26/18 which was negative. Patient continue to develop anemia despite APC and surgical intervention  was recommended. He had ex lap with partial small bowel resection on 3/13/2018 after which INR was reversed and 8 Iron infusion rx given to be discharges with Hb of 7 and INR of 2. He has been doing well since until yesterday when experienced profound weakness and SOB while going for  His sister's  function. He thought this was related to stress but decided to be checked at [facility] where his hemoglobin was 4.7 and INR was 4.1. He denied any overt bleed and report normal colored stool all thorough. Patient also denied LVAD alarms, chest pain or confusion.  He was transferred to [facility] after two units of blood transfusion for further evaluation and management.     * Normocytic anemia    -Transferred to [facility] yesterday afternoon for closer monitoring due to Dopplers in 50s, H and H lower. H and H now stable, BPs improved so will transfer back to the floor today  -GI consulted- plan for EGD/Cscope tomorrow (if INR less than 3)   - Most likely GI bleed due to quick drop from 7.7 to 4.1 in one week and hx of GI ulcer  -s/p 4 units PRBCs (2 at OSH, 2 here). Trend CBCs q 12 hours  -Cont PPI  -Hold coumadin  -Iron stores low- plan to give IV iron once bleeding has resolved and after scope          LVAD (left ventricular assist device) present    -HM III DT 2017  -INR supratherapeutic- down to 3.7 today. Hold coumadin  -Antiplatelets: ASA stopped 2017 for GIB/ileal ulcer and now S/P Small Bowel resection 3/13/18 for chronic NSAID induced ulcer   -MAP goal 60-80, holding home meds (lisinopril, spironolactone, amlodipine) due to low BPs in setting of GIB and INDIRA on admit  -Restarted home dose Lasix 20 BID yesterday  -Speed 5200    Interrogation as below  Procedure: Device Interrogation Including analysis of device parameters  Current Settings: Ventricular Assist Device  Review of device function is stable/unstable stable    TXP LVAD INTERROGATIONS 2018    Type HeartMate3 HeartMate3 - HeartMate3 HeartMate3 HeartMate3 HeartMate3   Flow 3.7 4.2 - 4.2 4.3 4.1 4.0   Speed 5200 5200 - 5200 5200 5200 5200   PI 3.8 3.3 - 3.4 3.6 3.7 3.9   Power ([place]) 3.6 3.6 - 3.5 3.5 3.7 3.6   LSL - - - - 4800 4800 4800   Pulsatility - - Intermittent pulse - - - -           Chronic combined systolic and diastolic congestive heart failure    -Restart home PO Lasix 20 BID  -s/p LVAD            Susannah Elizabeth PA-C  Heart Transplant  [facility]      Interrogation of Ventricular assist device was performed with physician analysis of device parameters and review of device function. I have personally reviewed the interrogation findings and agree with findings as stated.

## 2018-08-07 NOTE — ASSESSMENT & PLAN NOTE
-HM III DT 7/2017  -INR supratherapeutic- down to 3.7 today. Hold coumadin  -Antiplatelets: ASA stopped 12/2017 for GIB/ileal ulcer and now S/P Small Bowel resection 3/13/18 for chronic NSAID induced ulcer   -MAP goal 60-80, holding home meds (lisinopril, spironolactone, amlodipine) due to low BPs in setting of GIB and INDIRA on admit  -Restarted home dose Lasix 20 BID yesterday  -Speed 5200    Interrogation as below  Procedure: Device Interrogation Including analysis of device parameters  Current Settings: Ventricular Assist Device  Review of device function is stable/unstable stable    TXP LVAD INTERROGATIONS 8/5/2018 8/5/2018 7/12/2018 6/26/2018 6/26/2018 6/25/2018 6/25/2018   Type HeartMate3 HeartMate3 - HeartMate3 HeartMate3 HeartMate3 HeartMate3   Flow 3.7 4.2 - 4.2 4.3 4.1 4.0   Speed 5200 5200 - 5200 5200 5200 5200   PI 3.8 3.3 - 3.4 3.6 3.7 3.9   Power ([place]) 3.6 3.6 - 3.5 3.5 3.7 3.6   LSL - - - - 4800 4800 4800   Pulsatility - - Intermittent pulse - - - -

## 2018-08-07 NOTE — PROGRESS NOTES
8-0-56-Discussed case with - notified that patient is currently admitted with GI Bleed.   Alyssa GASTELUM CCM

## 2018-08-07 NOTE — SUBJECTIVE & OBJECTIVE
Interval History: No LH or dizziness. Feeling well this am    Continuous Infusions:  Scheduled Meds:   amiodarone  400 mg Oral Daily    dronabinol  5 mg Oral TID AC    [START ON 8/8/2018] furosemide  20 mg Oral Daily    pantoprazole  40 mg Oral Daily    polyethylene glycol  4,000 mL Oral Once    potassium chloride  20 mEq Oral Daily    pravastatin  20 mg Oral QHS     PRN Meds:sodium chloride, sodium chloride, acetaminophen, diphenhydrAMINE, HYDROcodone-acetaminophen    Review of patient's allergies indicates:  No Known Allergies  Objective:     Vital Signs (Most Recent):  Temp: 98.2 °F (36.8 °C) (08/07/18 1500)  Pulse: 80 (08/07/18 1500)  Resp: (!) 21 (08/07/18 1500)  BP: (!) 68/0 (08/07/18 1500)  SpO2: 99 % (08/07/18 1500) Vital Signs (24h Range):  Temp:  [98.2 °F (36.8 °C)-98.6 °F (37 °C)] 98.2 °F (36.8 °C)  Pulse:  [79-80] 80  Resp:  [11-46] 21  SpO2:  [97 %-100 %] 99 %  BP: ()/(0-79) 68/0     Patient Vitals for the past 72 hrs (Last 3 readings):   Weight   08/06/18 0742 72.7 kg (160 lb 4.4 oz)   08/05/18 0900 74.4 kg (164 lb)     Body mass index is 22.35 kg/m².      Intake/Output Summary (Last 24 hours) at 08/07/18 1606  Last data filed at 08/07/18 1334   Gross per 24 hour   Intake             1000 ml   Output             3515 ml   Net            -2515 ml       Hemodynamic Parameters:       Telemetry: reviewed    Physical Exam   Constitutional: He is oriented to person, place, and time. He appears well-developed and well-nourished.   Neck: Normal range of motion. Neck supple. No JVD present.   Cardiovascular: Normal rate and regular rhythm.    Normal VAD hum   Pulmonary/Chest: Effort normal and breath sounds normal. He has no wheezes. He has no rales.   Abdominal: Soft. Bowel sounds are normal. There is no tenderness.   Musculoskeletal: He exhibits no edema.   Neurological: He is alert and oriented to person, place, and time.   Skin: Skin is warm and dry.       Significant Labs:  CBC:    Recent  Labs  Lab 08/06/18  1541 08/06/18  2304 08/07/18  0800   WBC 10.48 8.86 8.35   RBC 2.62* 2.68* 2.72*   HGB 6.8* 6.8* 7.2*   HCT 21.7* 22.2* 22.6*    141* 148*   MCV 83 83 83   MCH 26.0* 25.4* 26.5*   MCHC 31.3* 30.6* 31.9*     BNP:    Recent Labs  Lab 08/06/18  0541   *     CMP:    Recent Labs  Lab 08/04/18  1850 08/06/18  0541 08/07/18  0258   * 97 89   CALCIUM 9.4 8.7 8.6*   ALBUMIN 3.8  --   --    PROT 6.7  --   --    * 136 134*   K 4.4 4.8 4.2   CO2 16* 23 25    107 103   BUN 62* 47* 32*   CREATININE 1.9* 1.1 1.1   ALKPHOS 48*  --   --    ALT 12  --   --    AST 14  --   --    BILITOT 0.5  --   --       Coagulation:     Recent Labs  Lab 08/04/18  1850  08/06/18  0541 08/06/18  0813 08/07/18  0258   INR 4.1*  < > 4.6* 4.5* 3.7*   APTT 31.1  --  31.3  --  30.0   < > = values in this interval not displayed.  LDH:    Recent Labs  Lab 08/06/18  0541 08/07/18  1137    154     Microbiology:  Microbiology Results (last 7 days)     ** No results found for the last 168 hours. **          I have reviewed all pertinent labs within the past 24 hours.    Estimated Creatinine Clearance: 66.1 mL/min (based on SCr of 1.1 mg/dL).    Diagnostic Results:  I have reviewed all pertinent imaging results/findings within the past 24 hours.

## 2018-08-07 NOTE — ANESTHESIA PREPROCEDURE EVALUATION
[facility]  Anesthesia Pre-Operative Evaluation         Patient Name: Suman Hayden  YOB: 1950  MRN: [id number]    SUBJECTIVE:     08/08/2018    Suman Hayden is a 68 y.o. male with HTN, HLD, NICM (s/p LVAD, HMIII as DT implanted 7/27/17), and multiple GIBs who presents for:    Pre-operative evaluation for Procedure(s) (LRB):  EGD (ESOPHAGOGASTRODUODENOSCOPY) (N/A)  COLONOSCOPY (N/A)    Post-op course complicated by GI bleeding had double balloon enteropscopy in 12/2017 (bleeding ulcer)  and 1/25/2018 no bleeding but ulcer found.  Retrograde DBE on 1/26/18 which was negative. Patient continue to develop anemia despite APC and surgical intervention was recommended. e had ex lap with partial small bowel resection on 3/13/2018 after which INR was reversed and 8 Iron infusion rx given. He was discharged with Hb of 7 and INR of 2. He then presented again to [facility] with weakness and SOB, where his hemoglobin was found to be 4.7 and INR was 4.1. He was transferred to [facility] after two units of blood transfusion for further evaluation and management. He was started on PPI and GI was consulted.    Notable labs 8/7/2018: Hgb 7.2 (improved from 6.8), INR 3.7 (improved from 4.5), K 4.2, Cr 1.1    LDA:        Peripheral IV - Single Lumen 08/04/18 2200 Right Antecubital (Active)   Site Assessment Clean;Dry;Intact;No redness;No swelling 8/7/2018  7:00 AM   Line Status Flushed;Saline locked 8/7/2018  7:00 AM   Dressing Status Clean;Dry;Intact 8/7/2018  7:00 AM   Dressing Intervention Dressing reinforced 8/7/2018  3:15 AM   Dressing Change Due 08/08/18 8/7/2018  3:15 AM   Site Change Due 08/08/18 8/6/2018  7:15 PM   Reason Not Rotated Not due 8/7/2018  7:00 AM   Number of days: 2            Peripheral IV - Single Lumen 08/06/18 1500 Left Antecubital (Active)   Site Assessment Clean;Dry;Intact;No redness;No swelling 8/7/2018  7:00 AM   Line Status Flushed;Saline locked 8/7/2018  7:00 AM    Dressing Status Clean;Dry;Intact 8/7/2018  7:00 AM   Dressing Intervention Dressing reinforced 8/7/2018  3:15 AM   Dressing Change Due 08/10/18 8/7/2018  3:15 AM   Site Change Due 08/10/18 8/6/2018  7:15 PM   Reason Not Rotated Not due 8/7/2018  7:00 AM   Number of days: 0            VAD 07/27/17 1312 Left ventricular assist device HeartMate 3 (Active)   Site Location Abdomen right 8/7/2018  7:00 AM   Site Assessment Other (Comment) 8/7/2018  7:00 AM   [place]veline Exit Site 1 8/7/2018  7:00 AM   Dressing Status Clean;Dry;Intact 8/7/2018  7:00 AM   Dressing Intervention New dressing 8/7/2018  7:00 AM   Performed By Family 8/7/2018  7:00 AM   Dressing Change Schedule Every other day 8/7/2018  7:00 AM   Dressing Change Due 08/09/18 8/7/2018  7:00 AM   [facility] in use [place] 8/7/2018  7:00 AM   Condition CDI 8/7/2018  7:00 AM   Date changed 08/06/18 8/7/2018  7:00 AM   Power Source External DC 8/7/2018  7:00 AM   Equipment inventory at  Other (specify) 8/6/2018  5:00 PM   Pump type HeartMate3 8/6/2018  7:15 PM   Primary Controller [id number] 8/6/2018  5:00 PM   Extra Controller [id number] 8/6/2018  5:00 PM   Battery 1 [id number] 8/6/2018  5:00 PM   Battery 2 [id number] 8/6/2018  5:00 PM   Battery 3 [id number] 8/6/2018  5:00 PM   Battery 4 [id number] 8/6/2018  5:00 PM   Battery 5 [id number] 6/26/2018  9:00 AM   Battery 6 [id number] 6/26/2018  9:00 AM   Battery 7 [id number] 6/15/2018  3:48 PM   Battery 8 [id number] 6/15/2018  3:48 PM   AC Adapter 1 [id number] 8/6/2018  5:00 PM   Power Module [id number] 8/6/2018  5:00 PM   Mobile Power Unit [id number] 8/6/2018  5:00 PM   Battery Clips [id number] 8/6/2018  5:00 PM   Monitor [id number] 8/6/2018  5:00 PM   Number of days: 375       Previous airway:   Placement Date: 03/13/18; Placement Time: 1256; Method of Intubation: Direct laryngoscopy; Inserted by: Other (CELIO Etienne); Airway Device: Endotracheal Tube; Mask Ventilation: Easy - oral; Intubated: Postinduction; Blade: Francois #2;  Airway Device Size: 7.5; Style: Cuffed; Placement Verified By: Auscultation, ETT Condensation, Capnometry; Grade: Grade II; Complicating Factors: None; Intubation Findings: Positive EtCO2, Bilateral breath sounds, Atraumatic/Condition of teeth unchanged;  Depth of Insertion: 22; Securment: Lips; Complications: None; Breath Sounds: Equal Bilateral; Insertion Attempts: 1; Removal Date: 03/13/18;  Removal Time: 1400      Patient Active Problem List   Diagnosis    Nonischemic cardiomyopathy    Encounter for monitoring amiodarone therapy    Essential hypertension    V-tach    Elevated PSA    Hepatitis B core antibody positive since 2012    Chronic combined systolic and diastolic congestive heart failure    Hyperbilirubinemia    Atrial tachycardia    Hyperglycemia    AICD (automatic cardioverter/defibrillator) present    LVAD (left ventricular assist device) present    Heart replaced by heart assist device    Chronic anticoagulation    Constipation    Normocytic anemia    Anemia    Ileum ulcer    Melena    Pure hypercholesterolemia    Acute blood loss anemia    GI bleed    Chronic systolic congestive heart failure    Wound drainage    ICD (implantable cardioverter-defibrillator) discharge    Inappropriate shocks from ICD (implantable cardioverter-defibrillator)    Tingling in extremities    Typical atrial flutter    Acute GI bleeding    Supratherapeutic INR       Review of patient's allergies indicates:  No Known Allergies    Current Inpatient Medications:   amiodarone  400 mg Oral Daily    dronabinol  5 mg Oral TID AC    furosemide  20 mg Oral BID    pantoprazole  40 mg Oral Daily    potassium chloride  20 mEq Oral Daily    pravastatin  20 mg Oral QHS       No current facility-administered medications on file prior to encounter.      Current Outpatient Prescriptions on File Prior to Encounter   Medication Sig Dispense Refill    amiodarone (PACERONE) 400 MG tablet Take 1 tablet (400 mg  total) by mouth once daily. 30 tablet 11    amLODIPine (NORVASC) 5 MG tablet Take 1 tablet (5 mg total) by mouth once daily. 90 tablet 3    dronabinol (MARINOL) 5 MG capsule Take 1 capsule (5 mg total) by mouth 3 (three) times daily before meals. 90 capsule 5    furosemide (LASIX) 20 MG tablet Take 1 tablet (20 mg total) by mouth 2 (two) times daily. 60 tablet 11    lisinopril (PRINIVIL,ZESTRIL) 5 MG tablet Take 1 tablet (5 mg total) by mouth once daily. 90 tablet 3    magnesium oxide (MAG-OX) 400 mg tablet Take 1 tablet (400 mg total) by mouth 2 (two) times daily. 180 tablet 3    pantoprazole (PROTONIX) 40 MG tablet Take 1 tablet (40 mg total) by mouth once daily. 30 tablet 11    potassium chloride SA (K-DUR,KLOR-CON) 20 MEQ tablet Take 1 tablet (20 mEq total) by mouth once daily. 30 tablet 3    pravastatin (PRAVACHOL) 20 MG tablet Take 1 tablet (20 mg total) by mouth every evening. 30 tablet 11    spironolactone (ALDACTONE) 25 MG tablet Take 1 tablet (25 mg total) by mouth once daily. 30 tablet 5    warfarin (COUMADIN) 5 MG tablet Take 2.5mg orally daily, except 5mg on M&F 32 tablet 5    HYDROcodone-acetaminophen (NORCO) 5-325 mg per tablet Take 1 tablet by mouth every 6 (six) hours as needed for Pain.      polyethylene glycol (GLYCOLAX) 17 gram PwPk Take 17 g by mouth daily as needed (constipation). 14 each 0       Past Surgical History:   Procedure Laterality Date    ABDOMINAL SURGERY      CARDIAC CATHETERIZATION      CARDIAC DEFIBRILLATOR PLACEMENT      CARDIAC DEFIBRILLATOR PLACEMENT      COLONOSCOPY      COLONOSCOPY N/A 3/9/2018    Procedure: COLONOSCOPY;  Surgeon: Andres Chatterjee MD;  Location: [facility] ([address] [place] [address]);  Service: Endoscopy;  Laterality: N/A;    ESOPHAGOGASTRODUODENOSCOPY      LEFT VENTRICULAR ASSIST DEVICE  07/27/2017       Social History     Social History    Marital status:      Spouse name: N/A    Number of children: N/A    Years of education: N/A      Occupational History    Not on file.     Social History Main Topics    Smoking status: Never Smoker    Smokeless tobacco: Never Used    Alcohol use No    Drug use: No    Sexual activity: Not Currently     Other Topics Concern    Not on file     Social History Narrative    No narrative on file       OBJECTIVE:     Vital Signs Range (Last 24H):  Temp:  [36.8 °C (98.2 °F)-37.1 °C (98.8 °F)]   Pulse:  [79-82]   Resp:  [12-40]   BP: ()/(0-80)   SpO2:  [94 %-100 %]       CBC:   Recent Labs      08/07/18   1643  08/08/18   0859   WBC  8.02  7.40   RBC  2.88*  2.56*   HGB  7.5*  6.5*   HCT  23.8*  21.3*   PLT  159  150   MCV  83  83   MCH  26.0*  25.4*   MCHC  31.5*  30.5*       CMP:   Recent Labs      08/07/18   0258  08/08/18   0441   NA  134*  131*   K  4.2  3.8   CL  103  96   CO2  25  24   BUN  32*  22   CREATININE  1.1  1.3   GLU  89  116*   MG  2.7*  2.6   PHOS  3.1  3.8   CALCIUM  8.6*  9.2   ALBUMIN   --   4.0   PROT   --   7.0   ALKPHOS   --   53*   ALT   --   16   AST   --   20   BILITOT   --   1.8*       INR:  Recent Labs      08/06/18   0541  08/06/18   0813  08/07/18   0258  08/08/18   0441   INR  4.6*  4.5*  3.7*  2.1*   APTT  31.3   --   30.0  26.6       Diagnostic Studies:   X-Ray Chest AP Portable (8/4/2018):  Lungs are clear.  No pleural effusion or pneumothorax.  Pacing device and LVAD are stable.  In comparison to the prior study, there is no adverse interval changes      EKG (8/4/2018):   Vent. Rate : 116 BPM     Atrial Rate : 081 BPM     P-R Int : 132 ms          QRS Dur : 158 ms      QT Int : 264 ms       P-R-T Axes : 000 035 -40 degrees     QTc Int : 366 ms  Atrial-paced rhythm with frequent ventricular-paced complexes and with  occasional supraventricular complexes and with occasional Premature  ventricular complexes  Nonspecific intraventricular block  Abnormal ECG  When compared with ECG of 13-JUN-2018 22:06,  Electronic ventricular pacemaker has replaced Electronic atrial  pacemaker  Confirmed by RADHA BLOUNT ([id number]) on 8/5/2018 4:33:52 PM    2D ECHO:  Results for orders placed or performed during the hospital encounter of 07/12/18   Echo doppler color flow   Result Value Ref Range    EF 15 (A) 55 - 65    Mitral Valve Regurgitation MILD     Est. PA Systolic Pressure 33.64     Tricuspid Valve Regurgitation TRIVIAL          ASSESSMENT/PLAN:     Anesthesia Evaluation    I have reviewed the Patient Summary Reports.    I have reviewed the Nursing Notes.   I have reviewed the Medications.     Review of Systems  Anesthesia Hx:  No problems with previous Anesthesia  History of prior surgery of interest to airway management or planning: heart surgery. Previous anesthesia: General  Procedure performed at an [facility] Facility.  Denies Personal Hx of Anesthesia complications.   Social:  Non-Smoker, No Alcohol Use    Hematology/Oncology:     Oncology Normal    -- Anemia:   EENT/Dental:EENT/Dental Normal   Cardiovascular:   Exercise tolerance: poor Pacemaker (AICD, last shock 11/28, hematoma over site) Hypertension CAD   CHF NYHA Classification IV S/p LVAD  Can walk up 1 flight of stairs without SOB   Pulmonary:  Pulmonary Normal    Renal/:  Renal/ Normal     Hepatic/GI:   PUD, GI bleed   Musculoskeletal:  Musculoskeletal Normal    Neurological:  Neurology Normal    Endocrine:  Endocrine Normal    Dermatological:  Skin Normal    Psych:  Psychiatric Normal           Physical Exam  General:  Well nourished    Airway/Jaw/Neck:  Airway Findings: Mouth Opening: Normal Tongue: Normal  General Airway Assessment: Adult  Mallampati: II  Improves to II with phonation.  TM Distance: Normal, at least 6 cm  Jaw/Neck Findings:  Neck ROM: Normal ROM      Dental:  Dental Findings: Edentulous, Upper Dentures, Lower Dentures   Chest/Lungs:  Chest/Lungs Findings: Clear to auscultation, Normal Respiratory Rate     Heart/Vascular:  Heart Findings: Other Heart Findings: LVAD in place     Abdomen:  Abdomen Findings:   Normal, Soft, Nontender     Musculoskeletal:  Musculoskeletal Findings: Normal    Mental Status:  Mental Status Findings:  Cooperative, Alert and Oriented         Anesthesia Plan  Type of Anesthesia, risks & benefits discussed:  Anesthesia Type:  general, MAC  Patient's Preference:   Intra-op Monitoring Plan: standard ASA monitors and arterial line  Intra-op Monitoring Plan Comments:   Post Op Pain Control Plan: multimodal analgesia, IV/PO Opioids PRN and per primary service following discharge from PACU  Post Op Pain Control Plan Comments:   Induction:   IV  Beta Blocker:  Patient is not currently on a Beta-Blocker (No further documentation required).       Informed Consent: Patient understands risks and agrees with Anesthesia plan.  Questions answered. Anesthesia consent signed with patient.  ASA Score: 4     Day of Surgery Review of History & Physical:    H&P update referred to the surgeon.         Ready For Surgery From Anesthesia Perspective.

## 2018-08-07 NOTE — ASSESSMENT & PLAN NOTE
-Transferred to CMICU yesterday afternoon for closer monitoring due to Dopplers in 50s, H and H lower. H and H now stable, BPs improved so will transfer back to the floor today  -GI consulted- plan for EGD/Cscope tomorrow (if INR less than 3)   - Most likely GI bleed due to quick drop from 7.7 to 4.1 in one week and hx of GI ulcer  -s/p 4 units PRBCs (2 at OSH, 2 here). Trend CBCs q 12 hours  -Cont PPI  -Hold coumadin  -Iron stores low- plan to give IV iron once bleeding has resolved and after scope

## 2018-08-07 NOTE — NURSING
AAOx4. Paced rhythm. Doppler pressure >60. Sats >90% on RA. No pain. 1 dark liquid stool; dark blackish green in color. H/H stable. Trending up. Ambulates to bathroom, but bsc ordered for Golytely prep tonight. Voids per urinal; adequate amounts. Clear liquid diet with good intake. Awaiting CSU bed. No acute events. Safety precautions maintained. Pt free from harm. Demonstrates proper use of call light. Will cont to monitor

## 2018-08-07 NOTE — PROGRESS NOTES
"Update:    SW spoke to pt's wife over the phone. Pt's wife reports the went home to [place] today for an appointment, and she will be returning to [facility] this evening. Pt's wife reports she is not coping well today and is feeling "mentally tired." Pt's wife yelling and tearful on the phone. SW providing emotional support and counseling. Pt's wife reports pt's family will be here for surgery tomorrow. SW providing ongoing psychosocial and counseling support, education, assistance, resources, and discharge planning as indicated. SW continuing to follow and remains available.     "

## 2018-08-07 NOTE — PLAN OF CARE
Problem: Patient Care Overview  Goal: Plan of Care Review  Outcome: Ongoing (interventions implemented as appropriate)  POC reviewed with pt and spouse. Pt AAOx4 and following commands. Pt on no continuous IV medication. Pt on room air. O2 saturation % throughout shift. Breath sounds clear. Pt has HM3, Speed set at 5200. Pt BP stable throughout shift. Pt UO adequate throughout shift, voids spontaneously. No bowel movements throughout shift. Skin free from any break down. VSS. See flow sheets for details. Family to remain at bedside. Will continue to monitor.

## 2018-08-07 NOTE — PROGRESS NOTES
HR resident notified that last MAP was 55. Labs sent. BP monitored closely. No new orders. Will continue to monitor.

## 2018-08-08 NOTE — PLAN OF CARE
Problem: Patient Care Overview  Goal: Plan of Care Review  Outcome: Ongoing (interventions implemented as appropriate)  Patient vital signs stable and remains free from injury.  Patient educated on plan of care: clear liquid diet, NPO @ 0000, monitor H&H and reports understanding.  All questions and concerns addressed.

## 2018-08-08 NOTE — [employer] PATIENT INSTRUCTIONS
Discharge Summary/Instructions after an Endoscopic Procedure  Patient Name: Suman Hayden  Patient MRN: [id number]  Patient YOB: 1950 Wednesday, August 08, 2018  Andres Chatterjee MD  RESTRICTIONS:  During your procedure today, you received medications for sedation.  These   medications may affect your judgment, balance and coordination.  Therefore,   for 24 hours, you have the following restrictions:   - DO NOT drive a car, operate machinery, make legal/financial decisions,   sign important papers or drink alcohol.    ACTIVITY:  Today: no heavy lifting, straining or running due to procedural   sedation/anesthesia.  The following day: return to full activity including work.  DIET:  Eat and drink normally unless instructed otherwise.     TREATMENT FOR COMMON SIDE EFFECTS:  - Mild abdominal pain, nausea, belching, bloating or excessive gas:  rest,   eat lightly and use a heating pad.  - Sore Throat: treat with throat lozenges and/or gargle with warm salt   water.  - Because air was used during the procedure, expelling large amounts of air   from your rectum or belching is normal.  - If a bowel prep was taken, you may not have a bowel movement for 1-3 days.    This is normal.  SYMPTOMS TO WATCH FOR AND REPORT TO YOUR PHYSICIAN:  1. Abdominal pain or bloating, other than gas cramps.  2. Chest pain.  3. Back pain.  4. Signs of infection such as: chills or fever occurring within 24 hours   after the procedure.  5. Rectal bleeding, which would show as bright red, maroon, or black stools.   (A tablespoon of blood from the rectum is not serious, especially if   hemorrhoids are present.)  6. Vomiting.  7. Weakness or dizziness.  GO DIRECTLY TO THE NEAREST EMERGENCY ROOM IF YOU HAVE ANY OF THE FOLLOWING:      Difficulty breathing              Chills and/or fever over 101 F   Persistent vomiting and/or vomiting blood   Severe abdominal pain   Severe chest pain   Black, tarry stools   Bleeding- more than one  tablespoon   Any other symptom or condition that you feel may need urgent attention  Your doctor recommends these additional instructions:  If any biopsies were taken, your doctors clinic will contact you in 1 to 2   weeks with any results.  - Return patient to hospital [facility] for ongoing care.   - Clear liquid diet.   - Continue present medications.   - Resume Coumadin (warfarin) at prior dose today.   - No recommendation at this time regarding repeat colonoscopy.   - Will monitor for signs of continued bleeding.  Make NPO after midnight in   case a procedure is needed tomorrow.  If he has signs of continued   bleeding, would perform antegrade double-balloon enteroscopy.    For questions, problems or results please call your physician - Andres Chatterjee MD at Work:  [phone].  [facility], EMERGENCY ROOM PHONE NUMBER: [phone]  IF A COMPLICATION OR EMERGENCY SITUATION ARISES AND YOU ARE UNABLE TO REACH   YOUR PHYSICIAN - GO DIRECTLY TO THE EMERGENCY ROOM.  Andres Chatterjee MD  8/8/2018 12:27:00 PM  This report has been verified and signed electronically.  [employer]

## 2018-08-08 NOTE — H&P (VIEW-ONLY)
[facility]  Gastroenterology  Consult Note    Patient Name: Suman Hayden  MRN: [id number]  Admission Date: 8/5/2018  Hospital Length of Stay: 0 days  Code Status: Prior   Attending Provider: Deejay Duff Jr.,*   Consulting Provider: Timmy Rene MD  Primary Care Physician: Joe Ernst MD  Principal Problem:<principal problem not specified>    Inpatient consult to Gastroenterology  Consult performed by: TIMMY RENE  Consult ordered by: CHRISTEL LOPEZ        Subjective:     HPI:  This is a 69 yo M with NICM s/p LVAD Jul 2017, and of GI bleed secondary to ileal ulcer s/p ileal resection 3/14/18 who presented to the ED today for shortness of breath. Patient reports he was with family yesterday and today and started to feel short of breath all of a sudden. His sister recently passed away and his wife initially thought his symptoms were due to his grief. He reports normal brown stool and denies any hematochezia, melena, or hematemesis. He denies any abdominal pain. At home he is on coumadin and is no longer on aspirin since the findings of his ileal ulcer. On arrival to the ED, his vitals were stable but he was found to have Hg of 4.7 (down from 7.7 two weeks ago). He denies any overt blood loss of any kind. He last had a colonoscopy on 3/9/18 with findings of diverticulosis and an EGD on 3/6/18 with multiple gastric polyps which were resected. His path from the surgery on 3/14/18 was benign.      Past Medical History:   Diagnosis Date    Arthritis     Cardiomyopathy     CHF (congestive heart failure)     Coronary artery disease     Encounter for blood transfusion     Gastric polyp     Hyperlipidemia     Hypertension     Hypotension, iatrogenic     Obesity     S/P implantation of automatic cardioverter/defibrillator (AICD)     Ventricular tachycardia        Past Surgical History:   Procedure Laterality Date    ABDOMINAL SURGERY      CARDIAC CATHETERIZATION       CARDIAC DEFIBRILLATOR PLACEMENT      CARDIAC DEFIBRILLATOR PLACEMENT      COLONOSCOPY      COLONOSCOPY N/A 3/9/2018    Procedure: COLONOSCOPY;  Surgeon: Andres Chatterjee MD;  Location: [facility] ([address] [place] [address]);  Service: Endoscopy;  Laterality: N/A;    ESOPHAGOGASTRODUODENOSCOPY      LEFT VENTRICULAR ASSIST DEVICE  07/27/2017       Review of patient's allergies indicates:  No Known Allergies  Family History     Problem Relation (Age of Onset)    Heart disease Mother, Father        Social History Main Topics    Smoking status: Never Smoker    Smokeless tobacco: Never Used    Alcohol use No    Drug use: No    Sexual activity: Not Currently     Review of Systems   Constitutional: Positive for activity change and fatigue. Negative for appetite change, chills and fever.   HENT: Negative for sore throat and trouble swallowing.    Respiratory: Positive for shortness of breath. Negative for cough.    Cardiovascular: Negative for chest pain and leg swelling.   Gastrointestinal: Negative for abdominal distention, abdominal pain, anal bleeding, blood in stool, constipation, diarrhea, nausea and vomiting.   Genitourinary: Negative for decreased urine volume and difficulty urinating.   Musculoskeletal: Negative for arthralgias and back pain.   Skin: Negative for color change and pallor.   Neurological: Positive for weakness and light-headedness. Negative for dizziness.   Psychiatric/Behavioral: Negative for agitation and confusion.     Objective:     Vital Signs (Most Recent):  Temp: 98.2 °F (36.8 °C) (08/05/18 1221)  Pulse: 80 (08/05/18 1221)  Resp: 18 (08/05/18 1221)  BP: 97/66 (08/05/18 1221)  SpO2: 99 % (08/05/18 1221) Vital Signs (24h Range):  Temp:  [98 °F (36.7 °C)-98.5 °F (36.9 °C)] 98.2 °F (36.8 °C)  Pulse:  [79-82] 80  Resp:  [10-27] 18  SpO2:  [91 %-100 %] 99 %  BP: ()/(0-66) 97/66     Weight: 74.4 kg (164 lb) (08/05/18 0900)  Body mass index is 22.87 kg/m².    No intake or output data in the 24 hours  ending 08/05/18 1233    Lines/Drains/Airways     Line                 VAD 07/27/17 1312 Left ventricular assist device HeartMate 3 373 days          Peripheral Intravenous Line                 Peripheral IV - Single Lumen 08/04/18 2200 Right Antecubital less than 1 day         Peripheral IV - Single Lumen 08/04/18 2215 Right Forearm less than 1 day                Physical Exam   Constitutional: He is oriented to person, place, and time. He appears well-developed and well-nourished. No distress.   HENT:   Mouth/Throat: Oropharynx is clear and moist.   Eyes: No scleral icterus.   Cardiovascular: Normal rate.    LVAD hum   Pulmonary/Chest: Effort normal and breath sounds normal.   Abdominal: Soft. Bowel sounds are normal. He exhibits no distension and no mass. There is no tenderness. There is no guarding.   Musculoskeletal: He exhibits no edema or deformity.   Lymphadenopathy:     He has no cervical adenopathy.   Neurological: He is alert and oriented to person, place, and time.   Skin: Skin is warm and dry.   Psychiatric: He has a normal mood and affect.   Vitals reviewed.      Significant Labs:  CBC:   Recent Labs  Lab 08/04/18  1850 08/05/18  0632   WBC 10.07 9.33   HGB 4.7* 6.5*   HCT 15.6* 20.1*    173     CMP:   Recent Labs  Lab 08/04/18  1850   *   CALCIUM 9.4   ALBUMIN 3.8   PROT 6.7   *   K 4.4   CO2 16*      BUN 62*   CREATININE 1.9*   ALKPHOS 48*   ALT 12   AST 14   BILITOT 0.5     Coagulation:   Recent Labs  Lab 08/04/18  1850 08/05/18  0632   INR 4.1* 3.4*   APTT 31.1  --            Assessment/Plan:     Normocytic anemia    67 yo M with LVAD 7/2017 for NICM and hx of ileal ulcer s/p small bowel resection 3/14/18 presented to the ED with shortness of breath found to have severe anemia with no overt signs of GI bleed. Given he has an LVAD, he is prone to bleed from AVMs. We will evaluate with both EGD and colonoscopy tomorrow.    Recommendations:  - Clear liquid diet today  -  Transfuse to keep Hg>7  - Golytely bowel prep this evening  - NPO after MN  - Plan for EGD and colonoscopy tomorrow            Thank you for your consult. I will follow-up with patient. Please contact us if you have any additional questions.    Timmy Hobbs MD  Gastroenterology  [facility]

## 2018-08-08 NOTE — NURSING
Patient transported back to  via stretcher accompanied by transport. VAD emergency bag, monitor and module at bedside.

## 2018-08-08 NOTE — NURSING TRANSFER
Nursing Transfer Note      8/8/2018     Transfer From: CMICU    Transfer via bed    Transfer with cardiac monitoring    Transported by ORIANA Grant    Medicines sent: GoLytely    Chart send with patient: Yes    Notified: spouse (at bedside)    Patient reassessed at: 0135, 8/8    Upon arrival to floor: cardiac monitor applied, patient oriented to room, call bell in reach and bed in lowest position

## 2018-08-08 NOTE — NURSING
Patient transported to EGD via stretcher accompanied by transport.  VAD emergency bag, monitor and module at bedside.

## 2018-08-08 NOTE — INTERVAL H&P NOTE
Pre-Procedure H and P Addendum    Patient seen and examined.  History and exam unchanged from prior history and physical.      Procedure: EGD and colonoscopy   Indication: GI bleeding  ASA Class: per anesthesiology  Airway: normal  Neck Mobility: full range of motion  Mallampatti score: per anesthesia  History of anesthesia problems: no  Family history of anesthesia problems: no  Anesthesia Plan: MAC    Anesthesia/Surgery risks, benefits and alternative options discussed and understood by patient/family.          Active Hospital Problems    Diagnosis  POA    *Normocytic anemia [D64.9]  Yes    Supratherapeutic INR [R79.1]  Yes    Acute GI bleeding [K92.2]  Yes    LVAD (left ventricular assist device) present [Z95.811]  Not Applicable    Chronic combined systolic and diastolic congestive heart failure [I50.42]  Yes    Nonischemic cardiomyopathy [I42.8]  Yes     Per Dr Pearl records    LVEDD 6.2 / LVEF 25 May 2017        Resolved Hospital Problems    Diagnosis Date Resolved POA    Subtherapeutic international normalized ratio (INR) [R79.1] 08/06/2018 Yes    Atrial fibrillation [I48.91] 08/06/2018 Yes    Acute renal injury [N17.9] 08/06/2018 Yes

## 2018-08-08 NOTE — [employer] PATIENT INSTRUCTIONS
Discharge Summary/Instructions after an Endoscopic Procedure  Patient Name: Suman Hayden  Patient MRN: [id number]  Patient YOB: 1950 Wednesday, August 08, 2018  Andres Chatterjee MD  RESTRICTIONS:  During your procedure today, you received medications for sedation.  These   medications may affect your judgment, balance and coordination.  Therefore,   for 24 hours, you have the following restrictions:   - DO NOT drive a car, operate machinery, make legal/financial decisions,   sign important papers or drink alcohol.    ACTIVITY:  Today: no heavy lifting, straining or running due to procedural   sedation/anesthesia.  The following day: return to full activity including work.  DIET:  Eat and drink normally unless instructed otherwise.     TREATMENT FOR COMMON SIDE EFFECTS:  - Mild abdominal pain, nausea, belching, bloating or excessive gas:  rest,   eat lightly and use a heating pad.  - Sore Throat: treat with throat lozenges and/or gargle with warm salt   water.  - Because air was used during the procedure, expelling large amounts of air   from your rectum or belching is normal.  - If a bowel prep was taken, you may not have a bowel movement for 1-3 days.    This is normal.  SYMPTOMS TO WATCH FOR AND REPORT TO YOUR PHYSICIAN:  1. Abdominal pain or bloating, other than gas cramps.  2. Chest pain.  3. Back pain.  4. Signs of infection such as: chills or fever occurring within 24 hours   after the procedure.  5. Rectal bleeding, which would show as bright red, maroon, or black stools.   (A tablespoon of blood from the rectum is not serious, especially if   hemorrhoids are present.)  6. Vomiting.  7. Weakness or dizziness.  GO DIRECTLY TO THE NEAREST EMERGENCY ROOM IF YOU HAVE ANY OF THE FOLLOWING:      Difficulty breathing              Chills and/or fever over 101 F   Persistent vomiting and/or vomiting blood   Severe abdominal pain   Severe chest pain   Black, tarry stools   Bleeding- more than one  tablespoon   Any other symptom or condition that you feel may need urgent attention  Your doctor recommends these additional instructions:  If any biopsies were taken, your doctors clinic will contact you in 1 to 2   weeks with any results.  - Perform a colonoscopy now.   - See colonoscopy report for further details.   For questions, problems or results please call your physician - Andres Chatterjee MD at Work:  [phone].  [facility], EMERGENCY ROOM PHONE NUMBER: [phone]  IF A COMPLICATION OR EMERGENCY SITUATION ARISES AND YOU ARE UNABLE TO REACH   YOUR PHYSICIAN - GO DIRECTLY TO THE EMERGENCY ROOM.  Andres Chatterjee MD  8/8/2018 11:41:20 AM  This report has been verified and signed electronically.  [employer]

## 2018-08-08 NOTE — SUBJECTIVE & OBJECTIVE
Interval History: No complaints overnight. 1 black BM Tuesday evening. Going for scope today with GI.     Continuous Infusions:   sodium chloride 0.9% 10 mL/hr at 08/08/18 1021     Scheduled Meds:   amiodarone  400 mg Oral Daily    dronabinol  5 mg Oral TID AC    furosemide  20 mg Oral BID    pantoprazole  40 mg Oral Daily    potassium chloride  20 mEq Oral Daily    pravastatin  20 mg Oral QHS     PRN Meds:sodium chloride, sodium chloride, sodium chloride, acetaminophen, diphenhydrAMINE, HYDROcodone-acetaminophen    Review of patient's allergies indicates:  No Known Allergies  Objective:     Vital Signs (Most Recent):  Temp: 97.9 °F (36.6 °C) (08/08/18 1000)  Pulse: 77 (08/08/18 1000)  Resp: 18 (08/08/18 1000)  BP: (!) 85/72 (08/08/18 1006)  SpO2: 100 % (08/08/18 1000) Vital Signs (24h Range):  Temp:  [97.9 °F (36.6 °C)-98.8 °F (37.1 °C)] 97.9 °F (36.6 °C)  Pulse:  [77-82] 77  Resp:  [12-40] 18  SpO2:  [94 %-100 %] 100 %  BP: ()/(0-80) 85/72     Patient Vitals for the past 72 hrs (Last 3 readings):   Weight   08/08/18 1000 71.2 kg (157 lb)   08/08/18 0800 71.3 kg (157 lb 3 oz)   08/06/18 0742 72.7 kg (160 lb 4.4 oz)     Body mass index is 21.9 kg/m².      Intake/Output Summary (Last 24 hours) at 08/08/18 1027  Last data filed at 08/08/18 0400   Gross per 24 hour   Intake              550 ml   Output             2110 ml   Net            -1560 ml     Hemodynamic Parameters:    Telemetry: reviewed    Physical Exam   Constitutional: He is oriented to person, place, and time. He appears well-developed and well-nourished.   Neck: Normal range of motion. Neck supple. No JVD present.   Cardiovascular: Normal rate and regular rhythm.    Normal VAD hum   Pulmonary/Chest: Effort normal and breath sounds normal. He has no wheezes. He has no rales.   Abdominal: Soft. Bowel sounds are normal. There is no tenderness.   Musculoskeletal: He exhibits no edema.   Neurological: He is alert and oriented to person, place,  and time.   Skin: Skin is warm and dry.     Significant Labs:  CBC:    Recent Labs  Lab 08/07/18  0800 08/07/18  1643 08/08/18  0859   WBC 8.35 8.02 7.40   RBC 2.72* 2.88* 2.56*   HGB 7.2* 7.5* 6.5*   HCT 22.6* 23.8* 21.3*   * 159 150   MCV 83 83 83   MCH 26.5* 26.0* 25.4*   MCHC 31.9* 31.5* 30.5*     BNP:    Recent Labs  Lab 08/06/18  0541 08/08/18  0441   * 217*     CMP:    Recent Labs  Lab 08/04/18  1850 08/06/18  0541 08/07/18  0258 08/08/18  0441   * 97 89 116*   CALCIUM 9.4 8.7 8.6* 9.2   ALBUMIN 3.8  --   --  4.0   PROT 6.7  --   --  7.0   * 136 134* 131*   K 4.4 4.8 4.2 3.8   CO2 16* 23 25 24    107 103 96   BUN 62* 47* 32* 22   CREATININE 1.9* 1.1 1.1 1.3   ALKPHOS 48*  --   --  53*   ALT 12  --   --  16   AST 14  --   --  20   BILITOT 0.5  --   --  1.8*      Coagulation:     Recent Labs  Lab 08/06/18  0541 08/06/18  0813 08/07/18  0258 08/08/18  0441   INR 4.6* 4.5* 3.7* 2.1*   APTT 31.3  --  30.0 26.6     LDH:    Recent Labs  Lab 08/06/18  0541 08/07/18  1137 08/08/18  0441    154 164     Microbiology:  Microbiology Results (last 7 days)     ** No results found for the last 168 hours. **          I have reviewed all pertinent labs within the past 24 hours.    Estimated Creatinine Clearance: 54.8 mL/min (based on SCr of 1.3 mg/dL).    Diagnostic Results:  I have reviewed all pertinent imaging results/findings within the past 24 hours.

## 2018-08-08 NOTE — PROGRESS NOTES
08/08/18 0142 08/08/18 0143   Vital Signs   Temp 98.2 °F (36.8 °C) --    Temp src Oral --    Pulse 79 --    Heart Rate Source Monitor --    Resp 16 --    SpO2 (!) 94 % --    Pulse Oximetry Type Intermittent --    O2 Device (Oxygen Therapy) room air --    /80 (!) 94/0   MAP (mmHg) 89 --    BP Location Left arm --    BP Method Automatic Doppler   Patient Position Lying --      Notified MD Jenn of VS upon arrival to CSU. No further orders at this time. Will monitor.

## 2018-08-08 NOTE — PROGRESS NOTES
Update:    SW to pt's room for update. Pt out of room for procedure. RN reports wife is with pt, and no other family here yet. SW to return as appropriate. SW providing ongoing psychosocial and counseling support, education, assistance, resources, and discharge planning as indicated. SW continuing to follow and remains available.

## 2018-08-08 NOTE — ASSESSMENT & PLAN NOTE
-HM III DT 7/2017  -INR therapeutic- down to 2.1 today. Consider resuming coumadin pending results of scope with GI today.   -Antiplatelets: ASA stopped 12/2017 for GIB/ileal ulcer and now S/P Small Bowel resection 3/13/18 for chronic NSAID induced ulcer   -MAP goal 60-80, holding home meds (lisinopril, spironolactone, amlodipine) due to low BPs in setting of GIB and INDIRA on admit  -Restarted home dose Lasix 20 BID yesterday  -Speed 5200    Interrogation as below  Procedure: Device Interrogation Including analysis of device parameters  Current Settings: Ventricular Assist Device  Review of device function is stable/unstable stable    TXP LVAD INTERROGATIONS 8/5/2018 8/5/2018 7/12/2018 6/26/2018 6/26/2018 6/25/2018 6/25/2018   Type HeartMate3 HeartMate3 - HeartMate3 HeartMate3 HeartMate3 HeartMate3   Flow 3.7 4.2 - 4.2 4.3 4.1 4.0   Speed 5200 5200 - 5200 5200 5200 5200   PI 3.8 3.3 - 3.4 3.6 3.7 3.9   Power ([place]) 3.6 3.6 - 3.5 3.5 3.7 3.6   LSL - - - - 4800 4800 4800   Pulsatility - - Intermittent pulse - - - -

## 2018-08-08 NOTE — ANESTHESIA POSTPROCEDURE EVALUATION
"Anesthesia Post Evaluation    Patient: Suman Hayden    Procedure(s) Performed: Procedure(s) (LRB):  EGD (ESOPHAGOGASTRODUODENOSCOPY) (N/A)  COLONOSCOPY (N/A)    Final Anesthesia Type: general  Patient location during evaluation: PACU  Patient participation: Yes- Able to Participate  Level of consciousness: awake and alert  Post-procedure vital signs: reviewed and stable  Pain management: adequate  Airway patency: patent  PONV status at discharge: No PONV  Anesthetic complications: no      Cardiovascular status: hemodynamically stable  Respiratory status: unassisted, spontaneous ventilation and room air  Hydration status: euvolemic  Follow-up not needed.        Visit Vitals  /85   Pulse 79   Temp 37.3 °C (99.1 °F) (Axillary)   Resp 15   Ht 5' 11" (1.803 m)   Wt 71.2 kg (157 lb)   SpO2 97%   BMI 21.90 kg/m²       Pain/Gurpreet Score: Pain Assessment Performed: Yes (8/8/2018 12:20 PM)  Presence of Pain: denies (8/8/2018 12:20 PM)  Gurpreet Score: 9 (8/8/2018 12:20 PM)      "

## 2018-08-08 NOTE — NURSING TRANSFER
Nursing Transfer Note      8/8/2018     Transfer TO ; FROM SICU [id number]  Transfer via wheelchair    Transfer with O2, cardiac monitoring    Transported by CLIFF Magana RN    Medicines sent: yes    Chart send with patient: Yes    Notified: spouse    Patient reassessed at: 0210; 8/8/18    Upon arrival to floor: cardiac monitor applied, patient oriented to room, call bell in reach and bed in lowest position    Report given to ORIANA Tyson on CSU

## 2018-08-08 NOTE — PROGRESS NOTES
VAD dressing changes disscussed with Dr. Barajas with HF. Will continue VAD dressing changed every other day per family member until clarified with VAD coordinator in the AM.

## 2018-08-08 NOTE — DISCHARGE INSTRUCTIONS
-Take 1/2 tab (2.5 mg) of coumadin today and tomorrow. Then get INR on Monday and coumadin clinic will give coumadin dosing.            Colonoscopy     A camera attached to a flexible tube with a viewing lens is used to take video pictures.     Colonoscopy is a test to view the inside of your lower digestive tract (colon and rectum). Sometimes it can show the last part of the small intestine (ileum). During the test, small pieces of tissue may be removed for testing. This is called a biopsy. Small growths, such as polyps, may also be removed.   Why is colonoscopy done?  The test is done to help look for colon cancer. And it can help find the source of abdominal pain, bleeding, and changes in bowel habits. It may be needed once a year, depending on factors such as your:  · Age  · Health history  · Family health history  · Symptoms  · Results from any prior colonoscopy  Risks and possible complications  These include:  · Bleeding               · A puncture or tear in the colon   · Risks of anesthesia  · A cancer lesion not being seen  Getting ready   To prepare for the test:  · Talk with your healthcare provider about the risks of the test (see below). Also ask your healthcare provider about alternatives to the test.  · Tell your healthcare provider about any medicines you take. Also tell him or her about any health conditions you may have.  · Make sure your rectum and colon are empty for the test. Follow the diet and bowel prep instructions exactly. If you dont, the test may need to be rescheduled.  · Plan for a friend or family member to drive you home after the test.     Colonoscopy provides an inside view of the entire colon.     You may discuss the results with your doctor right away or at a future visit.  During the test   The test is usually done in the hospital on an outpatient basis. This means you go home the same day. The procedure takes about 30 minutes. During that time:  · You are given relaxing  (sedating) medicine through an IV line. You may be drowsy, or fully asleep.  · The healthcare provider will first give you a physical exam to check for anal and rectal problems.  · Then the anus is lubricated and the scope inserted.  · If you are awake, you may have a feeling similar to needing to have a bowel movement. You may also feel pressure as air is pumped into the colon. Its OK to pass gas during the procedure.  · Biopsy, polyp removal, or other treatments may be done during the test.  After the test   You may have gas right after the test. It can help to try to pass it to help prevent later bloating. Your healthcare provider may discuss the results with you right away. Or you may need to schedule a follow-up visit to talk about the results. After the test, you can go back to your normal eating and other activities. You may be tired from the sedation and need to rest for a few hours.  Date Last Reviewed: 11/1/2016 © 2000-2017 [employer]. [address] [place] [address]. All rights reserved. This information is not intended as a substitute for professional medical care. Always follow your healthcare professional's instructions.        Upper GI Endoscopy     During endoscopy, a long, flexible tube is used to view the inside of your upper GI tract.      Upper GI endoscopy allows your healthcare provider to look directly into the beginning of your gastrointestinal (GI) tract. The esophagus, stomach, and duodenum (the first part of the small intestine) make up the upper GI tract.   Before the exam  Follow these and any other instructions you are given before your endoscopy. If you dont follow the healthcare providers instructions carefully, the test may need to be canceled or done over:  · Don't eat or drink anything after midnight the night before your exam. If your exam is in the afternoon, drink only clear liquids in the morning. Don't eat or drink anything for 8 hours before the  exam. In some cases, you may be able to take medicines with sips of water until 2 hours before the procedure. Speak with your healthcare provider about this.   · Bring your X-rays and any other test results you have.  · Because you will be sedated, arrange for an adult to drive you home after the exam.  · Tell your healthcare provider before the exam if you are taking any medicines or have any medical problems.  The procedure  Here is what to expect:  · You will lie on the endoscopy table. Usually patients lie on the left side.  · You will be monitored and given oxygen.  · Your throat may be numbed with a spray or gargle. You are given medicine through an intravenous (IV) line that will help you relax and remain comfortable. You may be awake or asleep during the procedure.  · The healthcare provider will put the endoscope in your mouth and down your esophagus. It is thinner than most pieces of food that you swallow. It will not affect your breathing. The medicine helps keep you from gagging.  · Air is put into your GI tract to expand it. It can make you burp.  · During the procedure, the healthcare provider can take biopsies (tissue samples), remove abnormalities, such as polyps, or treat abnormalities through a variety of devices placed through the endoscope. You will not feel this.   · The endoscope carries images of your upper GI tract to a video screen. If you are awake, you may be able to look at the images.  · After the procedure is done, you will rest for a time. An adult must drive you home.  When to call your healthcare provider  Contact your healthcare provider if you have:  · Black or tarry stools, or blood in your stool  · Fever  · Pain in your belly that does not go away  · Nausea and vomiting, or vomiting blood   Date Last Reviewed: 7/1/2016  © 0236-7332 [employer]. [address] [place] [address], [place] [address]. All rights reserved. This information is not intended as a substitute for  professional medical care. Always follow your healthcare professional's instructions.

## 2018-08-08 NOTE — PLAN OF CARE
Problem: Patient Care Overview  Goal: Plan of Care Review  Outcome: Ongoing (interventions implemented as appropriate)  POC reviewed with pt. VS and VAD numbers stable. Possible EGD and c-scope Wednesday if INR <3.0. INR 3.7 8/7/17. Pt stepped down to CSU this shift; wife at bedside. Pt taking GoLytely as ordered and on clear liquids. LVAD dressing CDI; dressing changed every other day by wife. Due 8/8. Encouraged pt to call for assistance out of bed. H/H drawn q12h; Hct updated on HM3. Pt remains free from falls, trauma, injury; pt tolerating POC well. Will continue to monitor.

## 2018-08-08 NOTE — PLAN OF CARE
Patient had EGD and C scope today without source identification.  Will keep on clear liquid diet and keep NPO at midnight for possible DBE tomorrow if patient has another bloody BM or continues to have signs/symptoms of bleeding.  Continue CBC q 12 hours.   OK to start coumadin at lower dose to keep patient's INR  ~2 but no greater than 3.    Isabel Chen PA-C

## 2018-08-08 NOTE — PROGRESS NOTES
[facility]  Heart Transplant  Progress Note    Patient Name: Suman Hayden  MRN: [id number]  Admission Date: 8/5/2018  Hospital Length of Stay: 3 days  Attending Physician: Brenden Veras MD  Primary Care Provider: Joe Ernst MD  Principal Problem:Normocytic anemia    Subjective:     Interval History: No complaints overnight. 1 black BM Tuesday evening. Going for scope today with GI.     Continuous Infusions:   sodium chloride 0.9% 10 mL/hr at 08/08/18 1021     Scheduled Meds:   amiodarone  400 mg Oral Daily    dronabinol  5 mg Oral TID AC    furosemide  20 mg Oral BID    pantoprazole  40 mg Oral Daily    potassium chloride  20 mEq Oral Daily    pravastatin  20 mg Oral QHS     PRN Meds:sodium chloride, sodium chloride, sodium chloride, acetaminophen, diphenhydrAMINE, HYDROcodone-acetaminophen    Review of patient's allergies indicates:  No Known Allergies  Objective:     Vital Signs (Most Recent):  Temp: 97.9 °F (36.6 °C) (08/08/18 1000)  Pulse: 77 (08/08/18 1000)  Resp: 18 (08/08/18 1000)  BP: (!) 85/72 (08/08/18 1006)  SpO2: 100 % (08/08/18 1000) Vital Signs (24h Range):  Temp:  [97.9 °F (36.6 °C)-98.8 °F (37.1 °C)] 97.9 °F (36.6 °C)  Pulse:  [77-82] 77  Resp:  [12-40] 18  SpO2:  [94 %-100 %] 100 %  BP: ()/(0-80) 85/72     Patient Vitals for the past 72 hrs (Last 3 readings):   Weight   08/08/18 1000 71.2 kg (157 lb)   08/08/18 0800 71.3 kg (157 lb 3 oz)   08/06/18 0742 72.7 kg (160 lb 4.4 oz)     Body mass index is 21.9 kg/m².      Intake/Output Summary (Last 24 hours) at 08/08/18 1027  Last data filed at 08/08/18 0400   Gross per 24 hour   Intake              550 ml   Output             2110 ml   Net            -1560 ml     Hemodynamic Parameters:    Telemetry: reviewed    Physical Exam   Constitutional: He is oriented to person, place, and time. He appears well-developed and well-nourished.   Neck: Normal range of motion. Neck supple. No JVD present.   Cardiovascular:  Normal rate and regular rhythm.    Normal VAD hum   Pulmonary/Chest: Effort normal and breath sounds normal. He has no wheezes. He has no rales.   Abdominal: Soft. Bowel sounds are normal. There is no tenderness.   Musculoskeletal: He exhibits no edema.   Neurological: He is alert and oriented to person, place, and time.   Skin: Skin is warm and dry.     Significant Labs:  CBC:    Recent Labs  Lab 08/07/18  0800 08/07/18  1643 08/08/18  0859   WBC 8.35 8.02 7.40   RBC 2.72* 2.88* 2.56*   HGB 7.2* 7.5* 6.5*   HCT 22.6* 23.8* 21.3*   * 159 150   MCV 83 83 83   MCH 26.5* 26.0* 25.4*   MCHC 31.9* 31.5* 30.5*     BNP:    Recent Labs  Lab 08/06/18  0541 08/08/18  0441   * 217*     CMP:    Recent Labs  Lab 08/04/18  1850 08/06/18  0541 08/07/18  0258 08/08/18  0441   * 97 89 116*   CALCIUM 9.4 8.7 8.6* 9.2   ALBUMIN 3.8  --   --  4.0   PROT 6.7  --   --  7.0   * 136 134* 131*   K 4.4 4.8 4.2 3.8   CO2 16* 23 25 24    107 103 96   BUN 62* 47* 32* 22   CREATININE 1.9* 1.1 1.1 1.3   ALKPHOS 48*  --   --  53*   ALT 12  --   --  16   AST 14  --   --  20   BILITOT 0.5  --   --  1.8*      Coagulation:     Recent Labs  Lab 08/06/18  0541 08/06/18  0813 08/07/18  0258 08/08/18  0441   INR 4.6* 4.5* 3.7* 2.1*   APTT 31.3  --  30.0 26.6     LDH:    Recent Labs  Lab 08/06/18  0541 08/07/18  1137 08/08/18  0441    154 164     Microbiology:  Microbiology Results (last 7 days)     ** No results found for the last 168 hours. **          I have reviewed all pertinent labs within the past 24 hours.    Estimated Creatinine Clearance: 54.8 mL/min (based on SCr of 1.3 mg/dL).    Diagnostic Results:  I have reviewed all pertinent imaging results/findings within the past 24 hours.    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 Post-op course complicated by Gi bleeding had double balloon enteropscopy in 12/2017 (bleeding ulcer)  and 1/25/2018 no bleeding but ulcer  found.  Retrograde DBE on 18 which was negative. Patient continue to develop anemia despite APC and surgical intervention was recommended. He had ex lap with partial small bowel resection on 3/13/2018 after which INR was reversed and 8 Iron infusion rx given to be discharges with Hb of 7 and INR of 2. He has been doing well since until yesterday when experienced profound weakness and SOB while going for  His sister's  function. He thought this was related to stress but decided to be checked at [facility] where his hemoglobin was 4.7 and INR was 4.1. He denied any overt bleed and report normal colored stool all thorough. Patient also denied LVAD alarms, chest pain or confusion.  [place] [facility] [place] for further evaluation and management.     * Normocytic anemia    - Transferred to CMICU afternoon for closer monitoring due to Dopplers in 50s, H and H lower. H and H now stable, BPs improved. Back on floor now.   - GI consulted- plan for EGD/Cscope today as INR is < 3   - Most likely GI bleed due to quick drop from 7.7 to 4.1 in one week and hx of GI ulcer  -s/p 4 units PRBCs (2 at OSH, 2 here). Will receive another unit today (total of 5). Trend CBCs q 12 hours  -Cont PPI  -Hold coumadin  -Iron stores low- plan to give IV iron once bleeding has resolved and after scope          LVAD (left ventricular assist device) present    -HM III DT 2017  -INR therapeutic- down to 2.1 today. Consider resuming coumadin pending results of scope with GI today.   -Antiplatelets: ASA stopped 2017 for GIB/ileal ulcer and now S/P Small Bowel resection 3/13/18 for chronic NSAID induced ulcer   -MAP goal 60-80, holding home meds (lisinopril, spironolactone, amlodipine) due to low BPs in setting of GIB and INDIRA on admit  -Restarted home dose Lasix 20 BID yesterday  -Speed 5200    Interrogation as below  Procedure: Device Interrogation Including analysis of device parameters  Current  Settings: Ventricular Assist Device  Review of device function is stable/unstable stable    TXP LVAD INTERROGATIONS 8/5/2018 8/5/2018 7/12/2018 6/26/2018 6/26/2018 6/25/2018 6/25/2018   Type HeartMate3 HeartMate3 - HeartMate3 HeartMate3 HeartMate3 HeartMate3   Flow 3.7 4.2 - 4.2 4.3 4.1 4.0   Speed 5200 5200 - 5200 5200 5200 5200   PI 3.8 3.3 - 3.4 3.6 3.7 3.9   Power ([place]) 3.6 3.6 - 3.5 3.5 3.7 3.6   LSL - - - - 4800 4800 4800   Pulsatility - - Intermittent pulse - - - -           Chronic combined systolic and diastolic congestive heart failure    -Restart home PO Lasix 20 BID  -s/p LVAD            Isabel Chen PA-C  Heart Transplant  [facility]        Interrogation of Ventricular assist device was performed with physician analysis of device parameters and review of device function. I have personally reviewed the interrogation findings and agree with findings as stated.

## 2018-08-08 NOTE — TRANSFER OF CARE
"Anesthesia Transfer of Care Note    Patient: Suman Hayden    Procedure(s) Performed: Procedure(s) (LRB):  EGD (ESOPHAGOGASTRODUODENOSCOPY) (N/A)  COLONOSCOPY (N/A)    Patient location: PACU    Anesthesia Type: general    Transport from OR: Transported from OR on room air with adequate spontaneous ventilation    Post pain: adequate analgesia    Post assessment: no apparent anesthetic complications    Post vital signs: stable    Level of consciousness: sedated    Nausea/Vomiting: no nausea/vomiting    Complications: none    Transfer of care protocol was followed      Last vitals:   Visit Vitals  BP (!) 85/72   Pulse 77   Temp 36.6 °C (97.9 °F) (Temporal)   Resp 18   Ht 5' 11" (1.803 m)   Wt 71.2 kg (157 lb)   SpO2 100%   BMI 21.90 kg/m²     "

## 2018-08-08 NOTE — TREATMENT PLAN
GI Progress note    EGD/colon done - please see full report for details.     No evidence of GIB found.     Recommend:  Please keep NPO PMN to assess Hgb in AM  OK to resume coumadin.   If further bleeding - VCE vs DBE  Please call GI with questions/updates.    Doug Kearns MD  Gastroenterology & Hepatology Fellow  Pager: [phone]

## 2018-08-08 NOTE — ANESTHESIA RELEASE NOTE
"Anesthesia Release from PACU Note    Patient: Suman Hayden    Procedure(s) Performed: Procedure(s) (LRB):  EGD (ESOPHAGOGASTRODUODENOSCOPY) (N/A)  COLONOSCOPY (N/A)    Anesthesia type: general    Post pain: Adequate analgesia    Post assessment: no apparent anesthetic complications and tolerated procedure well    Last Vitals:   Visit Vitals  /85   Pulse 79   Temp 37.3 °C (99.1 °F) (Axillary)   Resp 15   Ht 5' 11" (1.803 m)   Wt 71.2 kg (157 lb)   SpO2 97%   BMI 21.90 kg/m²       Post vital signs: stable    Level of consciousness: awake and alert     Nausea/Vomiting: no nausea/no vomiting    Complications: none    Airway Patency: patent    Respiratory: unassisted, spontaneous ventilation, room air    Cardiovascular: stable and blood pressure at baseline    Hydration: euvolemic  "

## 2018-08-08 NOTE — NURSING TRANSFER
Nursing Transfer Note      8/8/2018     Transfer To: 300    Transfer via stretcher    Transfer with cardiac monitoring per centralized telemetry, LVAD emergency bag (containing 2 backup batteries, 2 battery clips, and 1 backup controller + patient's home meds and various supplies)    Transported by RNx2    Medicines sent: none    Chart send with patient: Yes    Notified: patient's wife    Patient reassessed at: 8/8/2018 1:20 PM

## 2018-08-08 NOTE — ASSESSMENT & PLAN NOTE
- Transferred to CMICU afternoon for closer monitoring due to Dopplers in 50s, H and H lower. H and H now stable, BPs improved. Back on floor now.   - GI consulted- plan for EGD/Cscope today as INR is < 3   - Most likely GI bleed due to quick drop from 7.7 to 4.1 in one week and hx of GI ulcer  -s/p 4 units PRBCs (2 at OSH, 2 here). Will receive another unit today (total of 5). Trend CBCs q 12 hours  -Cont PPI  -Hold coumadin  -Iron stores low- plan to give IV iron once bleeding has resolved and after scope

## 2018-08-09 NOTE — ASSESSMENT & PLAN NOTE
- Transferred to CMICU afternoon for closer monitoring due to Dopplers in 50s, H and H lower. H and H now stable, BPs improved. Back on floor now.   - GI consulted- EGD/Cscope 8/8 with no obvious sign of bleeding. No further imaging today due to stable H & H and no melena overnight.  - Most likely GI bleed due to quick drop from 7.7 to 4.1 in one week and hx of GI ulcer  -s/p 5 units PRBCs (2 at OSH, 3 here). Trend CBCs q 12 hours  -Cont PPI  -Resume home dose of coumadin  -Iron stores low- plan to give IV iron once bleeding has resolved and after scope

## 2018-08-09 NOTE — PROGRESS NOTES
[facility]  Heart Transplant  Progress Note    Patient Name: Suman Hayden  MRN: [id number]  Admission Date: 8/5/2018  Hospital Length of Stay: 4 days  Attending Physician: Brenden Veras MD  Primary Care Provider: Joe Ernst MD  Principal Problem:Normocytic anemia    Subjective:     Interval History: No BM overnight. Feels great, H & H stable.     Continuous Infusions:   sodium chloride 0.9% 10 mL/hr at 08/08/18 1021     Scheduled Meds:   amiodarone  400 mg Oral Daily    dronabinol  5 mg Oral TID AC    ferric gluconate (FERRLECIT) IVPB  250 mg Intravenous BID    furosemide  20 mg Oral BID    pantoprazole  40 mg Oral Daily    potassium chloride  20 mEq Oral Daily    pravastatin  20 mg Oral QHS    spironolactone  25 mg Oral Daily    warfarin  2.5 mg Oral Daily     PRN Meds:sodium chloride, sodium chloride, sodium chloride, acetaminophen, diphenhydrAMINE, HYDROcodone-acetaminophen    Review of patient's allergies indicates:  No Known Allergies  Objective:     Vital Signs (Most Recent):  Temp: 98.7 °F (37.1 °C) (08/09/18 1122)  Pulse: 79 (08/09/18 1145)  Resp: 18 (08/09/18 0923)  BP: (!) 78/0 (08/09/18 1145)  SpO2: 99 % (08/09/18 1145) Vital Signs (24h Range):  Temp:  [98.2 °F (36.8 °C)-99.5 °F (37.5 °C)] 98.7 °F (37.1 °C)  Pulse:  [74-80] 79  Resp:  [12-20] 18  SpO2:  [95 %-100 %] 99 %  BP: ()/(0-85) 78/0     Patient Vitals for the past 72 hrs (Last 3 readings):   Weight   08/09/18 0600 69.1 kg (152 lb 5.4 oz)   08/08/18 1000 71.2 kg (157 lb)   08/08/18 0800 71.3 kg (157 lb 3 oz)     Body mass index is 21.25 kg/m².      Intake/Output Summary (Last 24 hours) at 08/09/18 1227  Last data filed at 08/09/18 1200   Gross per 24 hour   Intake             1440 ml   Output             2600 ml   Net            -1160 ml     Hemodynamic Parameters:    Telemetry: reviewed    Physical Exam   Constitutional: He is oriented to person, place, and time. He appears well-developed and  well-nourished.   Neck: Normal range of motion. Neck supple. No JVD present.   Cardiovascular: Normal rate and regular rhythm.    Normal VAD hum   Pulmonary/Chest: Effort normal and breath sounds normal. He has no wheezes. He has no rales.   Abdominal: Soft. Bowel sounds are normal. There is no tenderness.   Musculoskeletal: He exhibits no edema.   Neurological: He is alert and oriented to person, place, and time.   Skin: Skin is warm and dry.     Significant Labs:  CBC:    Recent Labs  Lab 08/08/18  1946 08/09/18  0548 08/09/18  0844   WBC 8.57 7.42 6.39   RBC 3.00* 3.11* 2.96*   HGB 8.0* 8.0* 7.7*   HCT 24.8* 25.8* 24.7*   * 164 145*   MCV 83 83 83   MCH 26.7* 25.7* 26.0*   MCHC 32.3 31.0* 31.2*     BNP:    Recent Labs  Lab 08/06/18  0541 08/08/18 0441   * 217*     CMP:    Recent Labs  Lab 08/04/18  1850  08/07/18 0258 08/08/18 0441 08/09/18  0548   *  < > 89 116* 63*   CALCIUM 9.4  < > 8.6* 9.2 8.8   ALBUMIN 3.8  --   --  4.0  --    PROT 6.7  --   --  7.0  --    *  < > 134* 131* 134*   K 4.4  < > 4.2 3.8 3.6   CO2 16*  < > 25 24 26     < > 103 96 101   BUN 62*  < > 32* 22 14   CREATININE 1.9*  < > 1.1 1.3 1.3   ALKPHOS 48*  --   --  53*  --    ALT 12  --   --  16  --    AST 14  --   --  20  --    BILITOT 0.5  --   --  1.8*  --    < > = values in this interval not displayed.   Coagulation:     Recent Labs  Lab 08/07/18  0258 08/08/18 0441 08/09/18  0548   INR 3.7* 2.1* 1.8*   APTT 30.0 26.6 25.9     LDH:    Recent Labs  Lab 08/07/18  1137 08/08/18  0441 08/09/18  0548    164 196     Microbiology:  Microbiology Results (last 7 days)     ** No results found for the last 168 hours. **          I have reviewed all pertinent labs within the past 24 hours.    Estimated Creatinine Clearance: 53.2 mL/min (based on SCr of 1.3 mg/dL).    Diagnostic Results:  I have reviewed all pertinent imaging results/findings within the past 24 hours.    Assessment and Plan:     Briefly 66 y/o  M with NICMM, HMIII as DT implanted 17. Post-op course complicated by Gi bleeding had double balloon enteropscopy in 2017 (bleeding ulcer)  and 2018 no bleeding but ulcer found.  Retrograde DBE on 18 which was negative. Patient continue to develop anemia despite APC and surgical intervention was recommended. He had ex lap with partial small bowel resection on 3/13/2018 after which INR was reversed and 8 Iron infusion rx given to be discharges with Hb of 7 and INR of 2. He has been doing well since until yesterday when experienced profound weakness and SOB while going for  His sister's  function. He thought this was related to stress but decided to be checked at [facility] where his hemoglobin was 4.7 and INR was 4.1. He denied any overt bleed and report normal colored stool all thorough. Patient also denied LVAD alarms, chest pain or confusion.  He was transferred to [facility] [place] after two units of blood transfusion for further evaluation and management.     * Normocytic anemia    - Transferred to CMICU afternoon for closer monitoring due to Dopplers in 50s, H and H lower. H and H now stable, BPs improved. Back on floor now.   - GI consulted- EGD/Cscope  with no obvious sign of bleeding. No further imaging today due to stable H & H and no melena overnight.  - Most likely GI bleed due to quick drop from 7.7 to 4.1 in one week and hx of GI ulcer  -s/p 5 units PRBCs (2 at OSH, 3 here). Trend CBCs q 12 hours  -Cont PPI  -Resume home dose of coumadin  -Iron stores low- plan to give IV iron once bleeding has resolved and after scope          LVAD (left ventricular assist device) present    -HM III DT 2017  -INR sub therapeutic- down to 1.8 today. NO HEPARIN BRIDGE after discussion at VAD rounds.   -Antiplatelets: ASA stopped 2017 for GIB/ileal ulcer and now S/P Small Bowel resection 3/13/18 for chronic NSAID induced ulcer   -MAP goal 60-80, holding home meds (lisinopril, amlodipine) due to low BPs  in setting of GIB and INDIRA on admit. BP normalized and INDIRA resolved. Added back aldactone today. Will add back BP meds as tolerated.   -Restarted home dose Lasix 20 BID yesterday  -Speed 5200    Interrogation as below  Procedure: Device Interrogation Including analysis of device parameters  Current Settings: Ventricular Assist Device  Review of device function is stable/unstable stable    TXP LVAD INTERROGATIONS 8/5/2018 8/5/2018 7/12/2018 6/26/2018 6/26/2018 6/25/2018 6/25/2018   Type HeartMate3 HeartMate3 - HeartMate3 HeartMate3 HeartMate3 HeartMate3   Flow 3.7 4.2 - 4.2 4.3 4.1 4.0   Speed 5200 5200 - 5200 5200 5200 5200   PI 3.8 3.3 - 3.4 3.6 3.7 3.9   Power ([place]) 3.6 3.6 - 3.5 3.5 3.7 3.6   LSL - - - - 4800 4800 4800   Pulsatility - - Intermittent pulse - - - -           Chronic combined systolic and diastolic congestive heart failure    -Restart home PO Lasix 20 BID  -s/p LVAD            Isabel Chen PA-C  Heart Transplant  [facility]

## 2018-08-09 NOTE — ASSESSMENT & PLAN NOTE
-HM III DT 7/2017  -INR sub therapeutic- down to 1.8 today. NO HEPARIN BRIDGE after discussion at VAD rounds.   -Antiplatelets: ASA stopped 12/2017 for GIB/ileal ulcer and now S/P Small Bowel resection 3/13/18 for chronic NSAID induced ulcer   -MAP goal 60-80, holding home meds (lisinopril, amlodipine) due to low BPs in setting of GIB and INDIRA on admit. BP normalized and INDIRA resolved. Added back aldactone today. Will add back BP meds as tolerated.   -Restarted home dose Lasix 20 BID yesterday  -Speed 5200    Interrogation as below  Procedure: Device Interrogation Including analysis of device parameters  Current Settings: Ventricular Assist Device  Review of device function is stable/unstable stable    TXP LVAD INTERROGATIONS 8/5/2018 8/5/2018 7/12/2018 6/26/2018 6/26/2018 6/25/2018 6/25/2018   Type HeartMate3 HeartMate3 - HeartMate3 HeartMate3 HeartMate3 HeartMate3   Flow 3.7 4.2 - 4.2 4.3 4.1 4.0   Speed 5200 5200 - 5200 5200 5200 5200   PI 3.8 3.3 - 3.4 3.6 3.7 3.9   Power ([place]) 3.6 3.6 - 3.5 3.5 3.7 3.6   LSL - - - - 4800 4800 4800   Pulsatility - - Intermittent pulse - - - -

## 2018-08-09 NOTE — PROGRESS NOTES
Update:    SW to pt's room for update. Pt and wife aaox4. Pt calm and quiet. Pt's wife animated and communicative. Pt's wife apologized for yelling at SW yesterday, and stated she had been having a difficult day. Pt's wife reports coping better today. SW providing emotional support to pt and wife. Pt and wife report no other needs at this time. SW providing ongoing psychosocial and counseling support, education, assistance, resources, and discharge planning as indicated. SW continuing to follow and remains available.

## 2018-08-09 NOTE — SUBJECTIVE & OBJECTIVE
Interval History: No BM overnight. Feels great, H & H stable.     Continuous Infusions:   sodium chloride 0.9% 10 mL/hr at 08/08/18 1021     Scheduled Meds:   amiodarone  400 mg Oral Daily    dronabinol  5 mg Oral TID AC    ferric gluconate (FERRLECIT) IVPB  250 mg Intravenous BID    furosemide  20 mg Oral BID    pantoprazole  40 mg Oral Daily    potassium chloride  20 mEq Oral Daily    pravastatin  20 mg Oral QHS    spironolactone  25 mg Oral Daily    warfarin  2.5 mg Oral Daily     PRN Meds:sodium chloride, sodium chloride, sodium chloride, acetaminophen, diphenhydrAMINE, HYDROcodone-acetaminophen    Review of patient's allergies indicates:  No Known Allergies  Objective:     Vital Signs (Most Recent):  Temp: 98.7 °F (37.1 °C) (08/09/18 1122)  Pulse: 79 (08/09/18 1145)  Resp: 18 (08/09/18 0923)  BP: (!) 78/0 (08/09/18 1145)  SpO2: 99 % (08/09/18 1145) Vital Signs (24h Range):  Temp:  [98.2 °F (36.8 °C)-99.5 °F (37.5 °C)] 98.7 °F (37.1 °C)  Pulse:  [74-80] 79  Resp:  [12-20] 18  SpO2:  [95 %-100 %] 99 %  BP: ()/(0-85) 78/0     Patient Vitals for the past 72 hrs (Last 3 readings):   Weight   08/09/18 0600 69.1 kg (152 lb 5.4 oz)   08/08/18 1000 71.2 kg (157 lb)   08/08/18 0800 71.3 kg (157 lb 3 oz)     Body mass index is 21.25 kg/m².      Intake/Output Summary (Last 24 hours) at 08/09/18 1227  Last data filed at 08/09/18 1200   Gross per 24 hour   Intake             1440 ml   Output             2600 ml   Net            -1160 ml     Hemodynamic Parameters:    Telemetry: reviewed    Physical Exam   Constitutional: He is oriented to person, place, and time. He appears well-developed and well-nourished.   Neck: Normal range of motion. Neck supple. No JVD present.   Cardiovascular: Normal rate and regular rhythm.    Normal VAD hum   Pulmonary/Chest: Effort normal and breath sounds normal. He has no wheezes. He has no rales.   Abdominal: Soft. Bowel sounds are normal. There is no tenderness.    Musculoskeletal: He exhibits no edema.   Neurological: He is alert and oriented to person, place, and time.   Skin: Skin is warm and dry.     Significant Labs:  CBC:    Recent Labs  Lab 08/08/18  1946 08/09/18  0548 08/09/18  0844   WBC 8.57 7.42 6.39   RBC 3.00* 3.11* 2.96*   HGB 8.0* 8.0* 7.7*   HCT 24.8* 25.8* 24.7*   * 164 145*   MCV 83 83 83   MCH 26.7* 25.7* 26.0*   MCHC 32.3 31.0* 31.2*     BNP:    Recent Labs  Lab 08/06/18  0541 08/08/18  0441   * 217*     CMP:    Recent Labs  Lab 08/04/18  1850  08/07/18 0258 08/08/18 0441 08/09/18  0548   *  < > 89 116* 63*   CALCIUM 9.4  < > 8.6* 9.2 8.8   ALBUMIN 3.8  --   --  4.0  --    PROT 6.7  --   --  7.0  --    *  < > 134* 131* 134*   K 4.4  < > 4.2 3.8 3.6   CO2 16*  < > 25 24 26     < > 103 96 101   BUN 62*  < > 32* 22 14   CREATININE 1.9*  < > 1.1 1.3 1.3   ALKPHOS 48*  --   --  53*  --    ALT 12  --   --  16  --    AST 14  --   --  20  --    BILITOT 0.5  --   --  1.8*  --    < > = values in this interval not displayed.   Coagulation:     Recent Labs  Lab 08/07/18 0258 08/08/18 0441 08/09/18  0548   INR 3.7* 2.1* 1.8*   APTT 30.0 26.6 25.9     LDH:    Recent Labs  Lab 08/07/18  1137 08/08/18 0441 08/09/18  0548    164 196     Microbiology:  Microbiology Results (last 7 days)     ** No results found for the last 168 hours. **          I have reviewed all pertinent labs within the past 24 hours.    Estimated Creatinine Clearance: 53.2 mL/min (based on SCr of 1.3 mg/dL).    Diagnostic Results:  I have reviewed all pertinent imaging results/findings within the past 24 hours.

## 2018-08-09 NOTE — PHYSICIAN QUERY
PT Name: Suman Hayden  MR #: [id number]    Physician Query Form - Atrial Fibrillation Specificity     CDS/: Jeni Olivas               Contact information:[email or web address]       This form is a permanent document in the medical record.     Query Date: August 9, 2018    By submitting this query, we are merely seeking further clarification of documentation. Please utilize your independent clinical judgment when addressing the question(s) below.    The medical record contains the following:   Indicators     Supporting Clinical Findings Location in Medical Record   X Atrial Fibrillation Atrial fibrillation -currently sinus. CHADSVASC 3   H&P 8/5    EKG results      Medication     X Treatment Will resume home coumadin 5/2.5mg schedule once INR is in therapeutic range H&P 8/5    Other         Provider, please further specify the Atrial Fibrillation diagnosis.    [  ] Chronic  [X  ] Paroxysmal  [  ] Permanent  [  ] Persistent  [  ] Other (please specify): ____________________________  [  ] Clinically Undetermined    Please document in your progress notes daily for the duration of treatment until resolved, and include in your discharge summary.

## 2018-08-09 NOTE — TELEPHONE ENCOUNTER
----- Message from Rubén Winchester MD sent at 8/9/2018  2:32 PM CDT -----  We can reschedule with a GLENN for when he is back on coumadin  ----- Message -----  From: Charity Valenzuela RN  Sent: 8/7/2018  11:13 AM  To: MD Reuben Bain,    As discussed yesterday, patient is currently admitted in the hospital with a GI bleed. Per the patient's spouse his coumadin is on hold. Please let me know how you would like to proceed with the scheduling of the ablation procedure.      Alyssa GASTELUM CCM

## 2018-08-09 NOTE — PLAN OF CARE
Problem: Patient Care Overview  Goal: Plan of Care Review  Outcome: Ongoing (interventions implemented as appropriate)  Patient vital signs stable and remains free from injury.  Patient educated on plan of care: resume cardiac diet, resume coumadin, monitor H&H and reports understanding.  All questions and concerns addressed.

## 2018-08-09 NOTE — PLAN OF CARE
Problem: Patient Care Overview  Goal: Plan of Care Review  Outcome: Ongoing (interventions implemented as appropriate)  POC reviewed with pt. VS and VAD numbers stable. H/H trending q8h; h/h 8.0/24.8. Pt underwent EGD, C-scope 8/8. No GIB found. Pt NPO MN for possible DBE Thursday. LVAD dressing changed by wife this evening. Pt remains free from falls, trauma, injury; pt tolerating POC well. Will continue to monitor.

## 2018-08-10 NOTE — PLAN OF CARE
Problem: Patient Care Overview  Goal: Plan of Care Review  Outcome: Ongoing (interventions implemented as appropriate)  Patient vital signs stable and remains free from injury.  Patient educated on plan of care: coumadin, monitor H&H, monitor for signs & symptoms of GIB and reports understanding.  All questions and concerns addressed.

## 2018-08-10 NOTE — PROGRESS NOTES
D/C note:    SW to pt's room for possible weekend D/C. Pt and wife aaox4, calm, and pleasant. Pt and wife report hoping to D/C early tomorrow morning if H&H stable, so that pt can attend his sister's . Pt and wife report no needs, and none identified. Pt's wife to provide transport. SW providing psychosocial and counseling support, education, assistance, resources, and D/C planning as indicated. SW remains available.

## 2018-08-10 NOTE — SUBJECTIVE & OBJECTIVE
Interval History: No BM overnight. No BRB and H & H stable. Can D/C tomorrow first thing tomorrow AM for sisters  if H & H stable and no further bleeding.     Continuous Infusions:   sodium chloride 0.9% 10 mL/hr at 18 1021     Scheduled Meds:   amiodarone  400 mg Oral Daily    dronabinol  5 mg Oral TID AC    ferric gluconate (FERRLECIT) IVPB  250 mg Intravenous BID    furosemide  20 mg Oral BID    pantoprazole  40 mg Oral Daily    potassium chloride  20 mEq Oral Daily    pravastatin  20 mg Oral QHS    spironolactone  25 mg Oral Daily     PRN Meds:sodium chloride, acetaminophen, diphenhydrAMINE, HYDROcodone-acetaminophen    Review of patient's allergies indicates:  No Known Allergies  Objective:     Vital Signs (Most Recent):  Temp: 99.2 °F (37.3 °C) (08/10/18 0850)  Pulse: 80 (08/10/18 1150)  Resp: 18 (08/10/18 1150)  BP: (!) 78/0 (08/10/18 1156)  SpO2: 97 % (08/10/18 1150) Vital Signs (24h Range):  Temp:  [97.8 °F (36.6 °C)-99.9 °F (37.7 °C)] 99.2 °F (37.3 °C)  Pulse:  [71-80] 80  Resp:  [15-18] 18  SpO2:  [95 %-99 %] 97 %  BP: (64-96)/(0-71) 78/0     Patient Vitals for the past 72 hrs (Last 3 readings):   Weight   08/10/18 0700 69 kg (152 lb 1.9 oz)   18 0600 69.1 kg (152 lb 5.4 oz)   18 1000 71.2 kg (157 lb)     Body mass index is 21.22 kg/m².      Intake/Output Summary (Last 24 hours) at 08/10/18 1248  Last data filed at 08/10/18 1200   Gross per 24 hour   Intake             1260 ml   Output             1300 ml   Net              -40 ml     Hemodynamic Parameters:    Telemetry: reviewed    Physical Exam   Constitutional: He is oriented to person, place, and time. He appears well-developed and well-nourished.   Neck: Normal range of motion. Neck supple. No JVD present.   Cardiovascular: Normal rate and regular rhythm.    Normal VAD hum   Pulmonary/Chest: Effort normal and breath sounds normal. He has no wheezes. He has no rales.   Abdominal: Soft. Bowel sounds are normal. There  is no tenderness.   Musculoskeletal: He exhibits no edema.   Neurological: He is alert and oriented to person, place, and time.   Skin: Skin is warm and dry.     Significant Labs:  CBC:    Recent Labs  Lab 08/09/18  1942 08/10/18  0632 08/10/18  0821   WBC 6.29 6.64 7.34   RBC 2.99* 3.10* 3.21*   HGB 7.6* 8.1* 8.4*   HCT 25.0* 25.8* 27.0*    174 184   MCV 84 83 84   MCH 25.4* 26.1* 26.2*   MCHC 30.4* 31.4* 31.1*     BNP:    Recent Labs  Lab 08/06/18  0541 08/08/18  0441 08/10/18  0632   * 217* 254*     CMP:    Recent Labs  Lab 08/04/18  1850  08/08/18 0441 08/09/18  0548 08/10/18  0632   *  < > 116* 63* 85   CALCIUM 9.4  < > 9.2 8.8 8.9   ALBUMIN 3.8  --  4.0  --  3.3*   PROT 6.7  --  7.0  --  5.9*   *  < > 131* 134* 134*   K 4.4  < > 3.8 3.6 3.6   CO2 16*  < > 24 26 26     < > 96 101 102   BUN 62*  < > 22 14 16   CREATININE 1.9*  < > 1.3 1.3 1.3   ALKPHOS 48*  --  53*  --  50*   ALT 12  --  16  --  14   AST 14  --  20  --  17   BILITOT 0.5  --  1.8*  --  1.0   < > = values in this interval not displayed.   Coagulation:     Recent Labs  Lab 08/08/18 0441 08/09/18  0548 08/10/18  0632   INR 2.1* 1.8* 2.0*   APTT 26.6 25.9 35.7*     LDH:    Recent Labs  Lab 08/08/18 0441 08/09/18  0548 08/10/18  0632    196 156     Microbiology:  Microbiology Results (last 7 days)     ** No results found for the last 168 hours. **          I have reviewed all pertinent labs within the past 24 hours.    Estimated Creatinine Clearance: 53.1 mL/min (based on SCr of 1.3 mg/dL).    Diagnostic Results:  I have reviewed all pertinent imaging results/findings within the past 24 hours.

## 2018-08-10 NOTE — HOSPITAL COURSE
Patient was admitted for GI bleed. He was transferred to CMICU afternoon for closer monitoring due to Dopplers in 50s, and acute drop in H&H with lack BMs. BP meds and lasix were discontinued. He was transfused a total of 5 units (2 prior to admission and 3 while at [facility] GI was consulted- EGD/C-scope was completed when INR was <3 on  with no obvious sign of bleeding. H & H stabilized and patient had no further episodes of melena or BRBPR while inpatient. Lasix was restarted. IV iron was given. His amlodipine 5 and lisinopril 5 were not restarted due to patients BP being well within goal. Can be restarted as an outpatient if needed for HTN.   He was officially made a no heparin bridge this admission due to recurrent GI bleeds. He will d/c today with follow up in LVAD clinic in ~2 weeks. CBC  with INR check on 18. Coumadin dose on discharge 2.5 mg daily.  LDH up some on day of D/C, 266. Tried to repeat LDH before pt left but it was not drawn and pt needed to get to his sister's . We will recheck LDH with labs on Monday.

## 2018-08-10 NOTE — PLAN OF CARE
Problem: Patient Care Overview  Goal: Plan of Care Review  Outcome: Ongoing (interventions implemented as appropriate)  POC reviewed with pt. VS stable. No acute events noted at this time. Pt voiced concerned of not having BM today. Informed pt this is common after a bowel cleanse. Infused Ferric gluconate, pt tolerated well. Dopplers and VAD values WDL. Bed low and locked, call light with in reach. Wife at bedside. Will continue to monitor.

## 2018-08-10 NOTE — ASSESSMENT & PLAN NOTE
-HM III DT 7/2017  -INR therapeutic- down to 2 today. He is a NO HEPARIN BRIDGE after discussion at VAD rounds.   -Antiplatelets: ASA stopped 12/2017 for GIB/ileal ulcer and now S/P Small Bowel resection 3/13/18 for chronic NSAID induced ulcer   -MAP goal 60-80, holding home meds (lisinopril, amlodipine) due to low BPs in setting of GIB and INDIRA on admit. BP normalized and INDIRA resolved. Added back aldactone. Will add back BP meds as tolerated.   -Restarted home dose Lasix 20 BID  -Speed 5200    Interrogation as below  Procedure: Device Interrogation Including analysis of device parameters  Current Settings: Ventricular Assist Device  Review of device function is stable/unstable stable    TXP LVAD INTERROGATIONS 8/5/2018 8/5/2018 7/12/2018 6/26/2018 6/26/2018 6/25/2018 6/25/2018   Type HeartMate3 HeartMate3 - HeartMate3 HeartMate3 HeartMate3 HeartMate3   Flow 3.7 4.2 - 4.2 4.3 4.1 4.0   Speed 5200 5200 - 5200 5200 5200 5200   PI 3.8 3.3 - 3.4 3.6 3.7 3.9   Power ([place]) 3.6 3.6 - 3.5 3.5 3.7 3.6   LSL - - - - 4800 4800 4800   Pulsatility - - Intermittent pulse - - - -

## 2018-08-10 NOTE — PHYSICIAN QUERY
PT Name: Suman Hayden  MR #: [id number]     Physician Query Form - Etiology of Condition Clarification      CDS/: Jeni Olivas               Contact information: [email or web address]   This form is a permanent document in the medical record.     Query Date: August 10, 2018    By submitting this query, we are merely seeking further clarification of documentation.  Please utilize your independent clinical judgment when addressing the question(s) below.     The medical record contains the following:    Findings Supporting Clinical Information Location in Medical Record   Most likely GI bleed due to quick drop from 7.7 to 4.1 in one week and hx of GI ulcer                                                                   GI consulted- EGD/Cscope 8/8 with no obvious sign of bleeding. No further imaging today due to stable H & H and no melena overnight                                           s/p 5 units PRBCs (2 at OSH, 3 here).     Resume home dose of coumadin  -Iron stores low- plan to give IV iron once bleeding has resolved and after scope    Diverticulosis of ascending colon and sigmoid colon     Typical appearance for peptic duodenitis  Transplant PN 8/9      Transplant PN 8/9        Transplant PN 8/9      Transplant PN 8/9      Colonoscopy 8/8    Upper GI endoscopy 8/8     Please document your best medical opinion regarding the etiology of _GI Bleed______ for which the primary focus of treatment is/was directed.       (  ) GI bleed due to coumadin    (  ) GI bleed due to diverticulosis     (  ) GI bleed due to peptic duodenitis     (X  ) Other____LVAD_____________      [    ]  Clinically Undetermined    Please document in your progress notes daily for the duration of treatment, until resolved, and include in your discharge summary.

## 2018-08-10 NOTE — ASSESSMENT & PLAN NOTE
- Transferred to CMICU afternoon for closer monitoring due to Dopplers in 50s, H and H lower. H and H now stable, BPs improved. Back on floor now.   - GI consulted- EGD/Cscope 8/8 with no obvious sign of bleeding. No further imaging today due to stable H & H and no melena overnight.  - Most likely GI bleed due to quick drop from 7.7 to 4.1 in one week and hx of GI ulcer  -s/p 5 units PRBCs (2 at OSH, 3 here). Trend CBCs q 12 hours  -Cont PPI  -Resume home dose of coumadin  -Iron stores low- giving IV iron

## 2018-08-10 NOTE — PROGRESS NOTES
[facility]  Heart Transplant  Progress Note    Patient Name: Suman Hayden  MRN: [id number]  Admission Date: 2018  Hospital Length of Stay: 5 days  Attending Physician: Brenden Veras MD  Primary Care Provider: Joe Ernst MD  Principal Problem:Normocytic anemia    Subjective:     Interval History: No BM overnight. No BRB and H & H stable. Can D/C tomorrow first thing tomorrow AM for sisters  if H & H stable and no further bleeding.     Continuous Infusions:   sodium chloride 0.9% 10 mL/hr at 18 1021     Scheduled Meds:   amiodarone  400 mg Oral Daily    dronabinol  5 mg Oral TID AC    ferric gluconate (FERRLECIT) IVPB  250 mg Intravenous BID    furosemide  20 mg Oral BID    pantoprazole  40 mg Oral Daily    potassium chloride  20 mEq Oral Daily    pravastatin  20 mg Oral QHS    spironolactone  25 mg Oral Daily     PRN Meds:sodium chloride, acetaminophen, diphenhydrAMINE, HYDROcodone-acetaminophen    Review of patient's allergies indicates:  No Known Allergies  Objective:     Vital Signs (Most Recent):  Temp: 99.2 °F (37.3 °C) (08/10/18 0850)  Pulse: 80 (08/10/18 1150)  Resp: 18 (08/10/18 1150)  BP: (!) 78/0 (08/10/18 1156)  SpO2: 97 % (08/10/18 1150) Vital Signs (24h Range):  Temp:  [97.8 °F (36.6 °C)-99.9 °F (37.7 °C)] 99.2 °F (37.3 °C)  Pulse:  [71-80] 80  Resp:  [15-18] 18  SpO2:  [95 %-99 %] 97 %  BP: (64-96)/(0-71) 78/0     Patient Vitals for the past 72 hrs (Last 3 readings):   Weight   08/10/18 0700 69 kg (152 lb 1.9 oz)   18 0600 69.1 kg (152 lb 5.4 oz)   18 1000 71.2 kg (157 lb)     Body mass index is 21.22 kg/m².      Intake/Output Summary (Last 24 hours) at 08/10/18 1248  Last data filed at 08/10/18 1200   Gross per 24 hour   Intake             1260 ml   Output             1300 ml   Net              -40 ml     Hemodynamic Parameters:    Telemetry: reviewed    Physical Exam   Constitutional: He is oriented to person, place, and time. He  appears well-developed and well-nourished.   Neck: Normal range of motion. Neck supple. No JVD present.   Cardiovascular: Normal rate and regular rhythm.    Normal VAD hum   Pulmonary/Chest: Effort normal and breath sounds normal. He has no wheezes. He has no rales.   Abdominal: Soft. Bowel sounds are normal. There is no tenderness.   Musculoskeletal: He exhibits no edema.   Neurological: He is alert and oriented to person, place, and time.   Skin: Skin is warm and dry.     Significant Labs:  CBC:    Recent Labs  Lab 08/09/18  1942 08/10/18  0632 08/10/18  0821   WBC 6.29 6.64 7.34   RBC 2.99* 3.10* 3.21*   HGB 7.6* 8.1* 8.4*   HCT 25.0* 25.8* 27.0*    174 184   MCV 84 83 84   MCH 25.4* 26.1* 26.2*   MCHC 30.4* 31.4* 31.1*     BNP:    Recent Labs  Lab 08/06/18  0541 08/08/18  0441 08/10/18  0632   * 217* 254*     CMP:    Recent Labs  Lab 08/04/18  1850  08/08/18  0441 08/09/18  0548 08/10/18  0632   *  < > 116* 63* 85   CALCIUM 9.4  < > 9.2 8.8 8.9   ALBUMIN 3.8  --  4.0  --  3.3*   PROT 6.7  --  7.0  --  5.9*   *  < > 131* 134* 134*   K 4.4  < > 3.8 3.6 3.6   CO2 16*  < > 24 26 26     < > 96 101 102   BUN 62*  < > 22 14 16   CREATININE 1.9*  < > 1.3 1.3 1.3   ALKPHOS 48*  --  53*  --  50*   ALT 12  --  16  --  14   AST 14  --  20  --  17   BILITOT 0.5  --  1.8*  --  1.0   < > = values in this interval not displayed.   Coagulation:     Recent Labs  Lab 08/08/18  0441 08/09/18  0548 08/10/18  0632   INR 2.1* 1.8* 2.0*   APTT 26.6 25.9 35.7*     LDH:    Recent Labs  Lab 08/08/18  0441 08/09/18  0548 08/10/18  0632    196 156     Microbiology:  Microbiology Results (last 7 days)     ** No results found for the last 168 hours. **          I have reviewed all pertinent labs within the past 24 hours.    Estimated Creatinine Clearance: 53.1 mL/min (based on SCr of 1.3 mg/dL).    Diagnostic Results:  I have reviewed all pertinent imaging results/findings within the past 24  hours.    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 Post-op course complicated by Gi bleeding had double balloon enteropscopy in 2017 (bleeding ulcer)  and 2018 no bleeding but ulcer found.  Retrograde DBE on 18 which was negative. Patient continue to develop anemia despite APC and surgical intervention was recommended. He had ex lap with partial small bowel resection on 3/13/2018 after which INR was reversed and 8 Iron infusion rx given to be discharges with Hb of 7 and INR of 2. He has been doing well since until yesterday when experienced profound weakness and SOB while going for  His sister's  function. He thought this was related to stress but decided to be checked at [facility] where his hemoglobin was 4.7 and INR was 4.1. He denied any overt bleed and report normal colored stool all thorough. Patient also denied LVAD alarms, chest pain or confusion.  He was transferred to [facility] after two units of blood transfusion for further evaluation and management.     * Normocytic anemia    - Transferred to CMICU afternoon for closer monitoring due to Dopplers in 50s, H and H lower. H and H now stable, BPs improved. Back on floor now.   - GI consulted- EGD/Cscope  with no obvious sign of bleeding. No further imaging today due to stable H & H and no melena overnight.  - Most likely GI bleed due to quick drop from 7.7 to 4.1 in one week and hx of GI ulcer  -s/p 5 units PRBCs (2 at OSH, 3 here). Trend CBCs q 12 hours  -Cont PPI  -Resume home dose of coumadin  -Iron stores low- giving IV iron          LVAD (left ventricular assist device) present    -HM III DT 2017  -INR therapeutic- down to 2 today. He is a NO HEPARIN BRIDGE after discussion at VAD rounds.   -Antiplatelets: ASA stopped 2017 for GIB/ileal ulcer and now S/P Small Bowel resection 3/13/18 for chronic NSAID induced ulcer   -MAP goal 60-80, holding home meds (lisinopril, amlodipine) due to low  BPs in setting of GIB and INDIRA on admit. BP normalized and INDIRA resolved. Added back aldactone. Will add back BP meds as tolerated.   -Restarted home dose Lasix 20 BID  -Speed 5200    Interrogation as below  Procedure: Device Interrogation Including analysis of device parameters  Current Settings: Ventricular Assist Device  Review of device function is stable/unstable stable    TXP LVAD INTERROGATIONS 8/5/2018 8/5/2018 7/12/2018 6/26/2018 6/26/2018 6/25/2018 6/25/2018   Type HeartMate3 HeartMate3 - HeartMate3 HeartMate3 HeartMate3 HeartMate3   Flow 3.7 4.2 - 4.2 4.3 4.1 4.0   Speed 5200 5200 - 5200 5200 5200 5200   PI 3.8 3.3 - 3.4 3.6 3.7 3.9   Power ([place]) 3.6 3.6 - 3.5 3.5 3.7 3.6   LSL - - - - 4800 4800 4800   Pulsatility - - Intermittent pulse - - - -           Chronic combined systolic and diastolic congestive heart failure    -Restart home PO Lasix 20 BID  -s/p LVAD            Isabel Chen PA-C  Heart Transplant  [facility]

## 2018-08-11 NOTE — DISCHARGE SUMMARY
[facility]  Heart Transplant  Discharge Summary      Patient Name: Suman Hayden  MRN: [id number]  Admission Date: 8/5/2018  Hospital Length of Stay: 6 days  Discharge Date and Time: 08/11/2018 12:39 PM  Attending Physician: No att. 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 Post-op course complicated by Gi bleeding had double balloon enteropscopy in 12/2017 (bleeding ulcer)  and 1/25/2018 no bleeding but ulcer found.  Retrograde DBE on 1/26/18 which was negative. Patient continue to develop anemia despite APC and surgical intervention was recommended. He had ex lap with partial small bowel resection on 3/13/2018 after which INR was reversed and 8 Iron infusion rx given to be discharges with Hb of 7 and INR of 2.     He has been doing well since until yesterday when experienced profound weakness and SOB. He thought this was related to stress but decided to be checked at [facility] where his hemoglobin was 4.7 and INR was 4.1. He denied any overt bleed and report normal colored stool all thorough. Patient also denied LVAD alarms, chest pain or confusion.  He was transferred to [facility] after two units of blood transfusion for further evaluation and management.     Procedure(s) (LRB):  EGD (ESOPHAGOGASTRODUODENOSCOPY) (N/A)  COLONOSCOPY (N/A)     Hospital Course: Patient was admitted for GI bleed. He was transferred to CMICU afternoon for closer monitoring due to Dopplers in 50s, and acute drop in H&H with lack BMs. BP meds and lasix were discontinued. He was transfused a total of 5 units (2 prior to admission and 3 while at [facility]). GI was consulted- EGD/C-scope was completed when INR was <3 on 8/8 with no obvious sign of bleeding. H & H stabilized and patient had no further episodes of melena or BRBPR while inpatient. Lasix was restarted. IV iron was given. His amlodipine  5 and lisinopril 5 were not restarted due to patients BP being well within goal. Can be restarted as an outpatient if needed for HTN.   He was officially made a no heparin bridge this admission due to recurrent GI bleeds. He will d/c today with follow up in LVAD clinic in ~2 weeks. CBC  with INR check on 18. Coumadin dose on discharge 2.5 mg daily.  LDH up some on day of D/C, 266. Tried to repeat LDH before pt left but it was not drawn and pt needed to get to his sister's . We will recheck LDH with labs on Monday.    Consults         Status Ordering Provider     Inpatient consult to Gastroenterology  Once     Provider:  (Not yet assigned)    Completed CHRISTEL LOPEZ     Inpatient consult to Registered Dietitian/Nutritionist  Once     Provider:  (Not yet assigned)    Completed CHRISTEL LOPEZ            Pending Diagnostic Studies:     None        Final Active Diagnoses:    Diagnosis Date Noted POA    PRINCIPAL PROBLEM:  Normocytic anemia [D64.9] 2017 Yes    LVAD (left ventricular assist device) present [Z95.811] 2017 Not Applicable    Supratherapeutic INR [R79.1] 2018 Yes    Acute GI bleeding [K92.2] 2018 Yes    Chronic combined systolic and diastolic congestive heart failure [I50.42] 2017 Yes    Nonischemic cardiomyopathy [I42.8] 2017 Yes      Problems Resolved During this Admission:    Diagnosis Date Noted Date Resolved POA    Atrial fibrillation [I48.91] 2017 Yes    Subtherapeutic international normalized ratio (INR) [R79.1] 2017 Yes    Acute renal injury [N17.9] 2017 Yes      Discharged Condition: stable    Disposition: Home or Self Care        Patient Instructions:     Diet Adult Regular     Notify your health care provider if you experience any of the following:  temperature >100.4     Notify your health care provider if you experience any of the following:  persistent nausea and vomiting or  diarrhea     Notify your health care provider if you experience any of the following:  severe uncontrolled pain     Notify your health care provider if you experience any of the following:  difficulty breathing or increased cough     Notify your health care provider if you experience any of the following:  severe persistent headache     Notify your health care provider if you experience any of the following:  persistent dizziness, light-headedness, or visual disturbances     Notify your health care provider if you experience any of the following:  increased confusion or weakness     Notify your health care provider if you experience any of the following:  redness, tenderness, or signs of infection (pain, swelling, redness, odor or green/yellow discharge around incision site)     Activity as tolerated       Medications:  Reconciled Home Medications:      Medication List      CHANGE how you take these medications    magnesium oxide 400 mg tablet  Commonly known as:  MAG-OX  Take 1 tablet (400 mg total) by mouth once daily.  What changed:  when to take this     warfarin 5 MG tablet  Commonly known as:  COUMADIN  Take 1/2 tab (2.5 mg) orally daily  What changed:  additional instructions        CONTINUE taking these medications    amiodarone 400 MG tablet  Commonly known as:  PACERONE  Take 1 tablet (400 mg total) by mouth once daily.     dronabinol 5 MG capsule  Commonly known as:  MARINOL  Take 1 capsule (5 mg total) by mouth 3 (three) times daily before meals.     furosemide 20 MG tablet  Commonly known as:  LASIX  Take 1 tablet (20 mg total) by mouth 2 (two) times daily.     HYDROcodone-acetaminophen 5-325 mg per tablet  Commonly known as:  NORCO  Take 1 tablet by mouth every 6 (six) hours as needed for Pain.     pantoprazole 40 MG tablet  Commonly known as:  PROTONIX  Take 1 tablet (40 mg total) by mouth once daily.     polyethylene glycol 17 gram Pwpk  Commonly known as:  GLYCOLAX  Take 17 g by mouth daily as  needed (constipation).     potassium chloride SA 20 MEQ tablet  Commonly known as:  K-DUR,KLOR-CON  Take 1 tablet (20 mEq total) by mouth once daily.     pravastatin 20 MG tablet  Commonly known as:  PRAVACHOL  Take 1 tablet (20 mg total) by mouth every evening.     spironolactone 25 MG tablet  Commonly known as:  ALDACTONE  Take 1 tablet (25 mg total) by mouth once daily.        STOP taking these medications    amLODIPine 5 MG tablet  Commonly known as:  NORVASC     lisinopril 5 MG tablet  Commonly known as:  KHALIDA FITZGERALD PA-C  Heart Transplant  [facility]

## 2018-08-11 NOTE — PLAN OF CARE
Problem: Patient Care Overview  Goal: Plan of Care Review  Outcome: Ongoing (interventions implemented as appropriate)  POC reviewed with pt. VS stable. LVAD values and dopplers WDL. New IV initiated in right FA. IV in the right AC D/C No acute events noted at this time. No complaints. Possible D/C in the A.M., pt requested early D/C  to attend sister's  at 10:00 in [place]. Bed low and locked, call light with in reach. Wife at bedside. Will continue to monitor.

## 2018-08-11 NOTE — NURSING
Patient D/C home per MD order. Tele removed, IV access removed and intact. VS stable, patient in no active distress, and no complaints at this time. Patient given AVS and explained to patient the medication changes. Explained to patient where to  medications. Patient verbalized complete understanding of all discharge instructions and follow up care. Patient's LVAD equipment reconciled. Patient awaiting escort will continue to monitor.

## 2018-08-13 NOTE — PROGRESS NOTES
: Patient was admitted for GI bleed. He was transferred to CMICU afternoon for closer monitoring due to Dopplers in 50s, and acute drop in H&H with lack BMs. BP meds and lasix were discontinued. He was transfused a total of 5 units (2 prior to admission and 3 while at [facility]). GI was consulted- EGD/C-scope was completed when INR was <3 on  with no obvious sign of bleeding. H & H stabilized and patient had no further episodes of melena or BRBPR while inpatient. Lasix was restarted. IV iron was given. His amlodipine 5 and lisinopril 5 were not restarted due to patients BP being well within goal. Can be restarted as an outpatient if needed for HTN.   He was officially made a no heparin bridge this admission due to recurrent GI bleeds. He will d/c today with follow up in LVAD clinic in ~2 weeks. CBC  with INR check on 18. Coumadin dose on discharge 2.5 mg daily.  LDH up some on day of D/C, 266. Tried to repeat LDH before pt left but it was not drawn and pt needed to get to his sister's . We will recheck LDH with labs on Monday.

## 2018-08-14 NOTE — PATIENT INSTRUCTIONS
Uncertain Causes of Fall  You have had a fall today. But the cause of your fall is not certain. Falls can occur due to slipping, tripping or losing your balance. A fall can also occur from a fainting spell or seizure.  While a fall can happen for a simple reason (tripping over something), falls in elderly people are often caused by a combination of things:  · Age-related decline in function with worsening balance, stability, vision, and muscle strength  · Chronic illness such as heart arrhythmias, heart valve disease, vascular disease, COPD, diabetes, strokes, arthritis  · Effects or side effects of medicines  · Dehydration.  · Environmental hazards such as uneven or slippery ground, unfamiliar place, obstacles, uneven surfaces, or slippery ground  · Situational factors (related to the activity being done, e.g., rushing to the bathroom)  Because the cause of your fall today is not certain, it is possible that a fainting spell or seizure was the cause. This means that it could happen again, without warning. If you fall again, without a cause, then you should return to this facility promptly to have further tests. Otherwise, follow up with your doctor as explained below.  It is normal to feel sore and tight in your muscles and back the next day, and not just the muscles you initially injured. Remember, all the parts of your body are connected, so while initially one area hurts, the next day another may hurt. Also, when you injure yourself, it causes inflammation, which then causes the muscles to tighten up and hurt more. After the initial worsening, it should gradually improve over the next few days. However, more severe pain should be reported.  Even without a definite head injury, you can still get a concussion. Concussions and even bleeding can still occur, especially if you have had a recent injury or take blood thinner medicine. It is not unusual to have a mild headache and feel tired and even nauseous or  dizzy.  Home care  · Rest today and resume your normal activities as soon as you are feeling back to normal. It is best to remain with someone who can check on you for the next 24 hours to watch for another episode of falling.  · If you were injured during the fall, follow the advice from your doctor regarding care of your injury.  ·  If you become light-headed or dizzy, lie down immediately or sit and lean forward with your head down.  · As a precaution, do not drive a car or operate dangerous equipment, do not take a bath alone (use a shower instead) and do not swim alone until you see your doctor. A condition causing fainting or seizures must be ruled out before resuming these activities.  · You may use acetaminophen or ibuprofen to control pain, unless another pain medicine was prescribed. If you have chronic liver or kidney disease or ever had a stomach ulcer or gastrointestinal bleeding, talk with your doctor before using these medicines.  · Keep your appointments for any further testing that may have been scheduled for you.  Follow-up care  Follow up with your healthcare provider, or as advised.  If X-rays or CT scan were done, you will be notified if there is a change in the reading, especially if it affects treatment.  Call 911  Call 911 if any of these occur:  · Trouble breathing  · Confused or difficulty arousing  · Fainting or loss of consciousness  · Rapid or very slow heart rate  · Seizure  · Difficulty with speech or vision, weakness of an arm or leg  · Difficulty walking or talking, loss of balance, numbness or weakness in one side of your body, facial droop  When to seek medical advice  Call your healthcare provider right away if any of these occur:  · Another unexplained fall  · Dizziness  · Severe headache  · Nausea and vomiting  · Blood in vomit, stools (black or red color)  Date Last Reviewed: 11/5/2015  © 4501-5339 [employer]. [address] [place] [address], [place] [address]. All rights  reserved. This information is not intended as a substitute for professional medical care. Always follow your healthcare professional's instructions.

## 2018-08-15 NOTE — TELEPHONE ENCOUNTER
Mrs. Hayden called another VC, Rowan Osorio asking for a refill of marinol and that it has not been sent over yet.  From what I can tell, the Rx is not signed.  Will ask Dr. Torres to sign refill from 8/13/18.

## 2018-08-15 NOTE — TELEPHONE ENCOUNTER
Mrs. Hayden called to ask about getting an appt with a  for the back up caregiver.  I asked her to please reach out to the social workers and their phone numbers are in the binder.   Mrs. Hayden verbalized understanding and agreement.     1600:  ADRIAN Heath, called and stated that Mrs. Hayden called her to make an appt for a .  Redirected her to give Mrs. Hayden the same directions as I did earlier.

## 2018-08-16 NOTE — PROGRESS NOTES
"FOLLOW-UP to TRANSPLANT RECIPIENT ADULT PSYCHOSOCIAL ASSESSMENT initially conducted on 5/9/18:    SW met with pt's dgtr Rayna Frazier in clinic on 8/15 for caregiver education.  Rayna's , Max Frazier, was on speaker phone for duration of visit.  Pt and wife NOT present today.  Dgtr was in [place] for a business meeting today and requested to meet with SW while she was in town.     provided in-depth information to caregiver regarding pre- and post-transplant caregiver role.   strongly encourages caregiver to have concrete plan regarding post-transplant care giving, including back-up caregiver(s) to ensure care giving needs are met as needed.  Caregiver states understanding all aspects of caregiver role/commitment and is able/willing/committed to being caregiver to the fullest extent necessary.  Caregiver verbalizes understanding of the education provided today and caregiver responsibilities.  Caregiver agrees to contact  in a timely manner if concerns arise.    Dgtr reports that pt has been living at her home since his VAD implant in 7/2017.  Dgtr reports pt just moved back to his home on 8/11, where he now lives with wife Kia and his son Suman Hayden III ("Boni").  Dgtr reports pt's wife is always present with him at hospital and clinic visits, but dgtr and her family are the ones who have been caring for pt and looking after him at home since VAD.  Dgtr reports this was partially due to poor conditions at pt's home.  Dgtr reports pt's wife is "a hoarder" and house was very cluttered.  Dgtr states house is not unclean or dirty, but very disorganized.  Dgtr reports pt's wife has been "working on" the home and it is now suitable for pt to live there again, so pt moved back in a few days ago.  Dgtr states pt is welcome to come back to stay at her home at any time.  Dgtr requesting today that VAD/transplant staff assist with reiterating that to pt & wife that pt " "will need to be in a clean environment post-transplant.  SW will inform VAD team.    Dgtr reports pt's post-transplant caregiving plan is as follows:    Primary Caregiver  Name: Kia Hayden, pt's wife  Phone: [phone]  Drives: yes  Wife is retired and is able to stay with pt full-time at hospital and during post-transplant local stay.    Backup Caregivers  Name: Rayna Frazier, pt's dgtr  Phone: [phone]  Drives: yes  Dgtr reports she works full-time in real estate.  Dgtr reports she has been using FMLA time on & off to help with pt's care since VAD implant in 7/2017.  Dgtr states she is almost out of FMLA time now and will not be able to stay with pt locally post-transplant.  Dgtr reports pt can live at her home at any time if he needs to.  Dgtr reports she has provided a lot of support to pt since VAD and reports she plans to continue doing so.  Dgtr states, "whatever Daddy needs, he will have."    Name: Max Frazier, pt's son-in-law (Rayna's )  Phone: [phone]  Drives: yes  Max reports he is  and can be flexible with work, if needed.  Rayna and Max both agree that if someone other than pt's wife needs to stay with pt locally in [place] at any time, Max is able and willing to take time away from work to stay with pt locally.  Max reports he has two sisters who have received kidney transplants at [facility] and is familiar with post-transplant process and caregiving needs.    Name: Suman Hayden III ("Boni"), pt's son  Phone: [phone]  Drives: yes  Rayna reports that pt is now living back at his own home with wife and son Boni.  Rayna reports Boni works, but will be available to assist pt and wife at home once they return to [place].      SUITABILITY: Patient does NOT present as a suitable candidate for transplant at this time due to financial concerns.  Per review of previous documentation and discussion with SW team, pt and wife reported " decrease in monthly income recently and their monthly bills are now greater than their monthly income.  Per report from TIERNEY Pacheco, pt has $1,500 annual OOP on [employer] secondary plan.  SW and financial coord. ARLETLeobardo Tara will meet with pt at next clinic visit on 8/29 to review annual OOP and to get update on pts finances/ability to afford OOP.  SW will also f/u with pt and wife re: status of applications for Medicaid and food stamps.    Based on psychosocial risk factors, patient presents as medium risk due to limited finances and concerns about home environment.  As above, SW & financial coord. will meet with pt and wife at clinic visit on 8/29 for f/u.  Dgtr reports today that pts wife is a hoarder and home environment can be cluttered.  Dgtr reports home is not unclean or dirty.    Since LVAD implant, pt and wife have demonstrated adequate coping.  Pt and wife have experienced increased distress at times and have experienced some relationship issues due to this distress, but report coping better at recent visits.  Dgtr reports today that pt has strong backup caregiver plan in dgtr and son-in-law.        Heriberto Wells, LCSW      *Note updated on 8/29 to reflect that dgtr's last name is Freddie, not Edy.

## 2018-08-23 NOTE — TELEPHONE ENCOUNTER
Discussed case with MD Reuben states he would like to proceed with scheduling for AF ablation after patient has 3 consecutive INR's within 2-3. As of today patient has 2 consecutive INR within range-patient scheduled for INR on 8-24-18.  Alyssa GASTELUM

## 2018-08-29 NOTE — PROGRESS NOTES
Seen pt in clinic. Conducted 6mo PM on MPU, charged backup PC, and checked modular cable connector. No other issues at this time, will follow up at next visit.

## 2018-08-29 NOTE — LETTER
August 29, 2018        Joe Ernst  [address] [place] [address]  Phone: [phone]  Fax: [phone]             [facility] [address] [place] [address]  Phone: [phone]   Patient: Suman Hayden   MR Number: [id number]   YOB: 1950   Date of Visit: 8/29/2018       Dear Dr. Joe Ernst    Thank you for referring Suman Hayden to me for evaluation. Attached you will find relevant portions of my assessment and plan of care.    If you have questions, please do not hesitate to call me. I look forward to following Suman Hayden along with you.    Sincerely,    Mohit Ambrosio MD    Enclosure    If you would like to receive this communication electronically, please contact [email or web address] or [phone] to request [employer] access.    [employer] is a tool which provides read-only access to select patient information with whom you have a relationship. Its easy to use and provides real time access to review your patients record including encounter summaries, notes, results, and demographic information.    If you feel you have received this communication in error or would no longer like to receive these types of communications, please e-mail [email or web address]

## 2018-08-29 NOTE — PROCEDURES
Suman Hayden is a 68 y.o.  male patient, who presents for a 6 minute walk test ordered by Sunny Downing MD.  The diagnosis is Congestive Heart Failure; LVAD.  The patient's BMI is 22.8 kg/m2.  Predicted distance (lower limit of normal) is 387.17 meters.      Test Results:    The test was completed without stopping.  The total time walked was 360 seconds.  During walking, the patient reported:  No complaints.  The patient used a wheelchair for assistance during testing.     08/29/2018---------Distance: 284.07 meters (932 feet)     O2 Sat % Supplemental Oxygen Heart Rate Blood Pressure Sayda Scale   Pre-exercise  (Resting) 99 % Room Air 80 bpm Unable to obtain 0   During Exercise 100 % Room Air 81 bpm Unable to obtain 0   Post-exercise  (Recovery) 100 % Room Air  80 bpm Unable to obtain      Recovery Time:  55 seconds    Performing nurse/tech:  CLIFF Pedro RRT.      PREVIOUS STUDY:   07/18/2017---------Distance: 121.92 meters (400 feet)       O2 Sat % Supplemental Oxygen Heart Rate Blood Pressure Sayda Scale   Pre-exercise  (Resting) 100 % Room Air 60 bpm 119/76 mmHg 0   During Exercise 99 % Room Air 104 bpm 136/67 mmHg    Post-exercise  (Recovery)        mmHg          CLINICAL INTERPRETATION:  Six minute walk distance is 284.07 meters (932 feet) with no dyspnea.  During exercise, there was no desaturation while breathing room air.  Heart rate remained stable with walking.  The patient did not report non-pulmonary symptoms during exercise.  Significant exercise impairment is likely due to subjective symptoms.  The patient did complete the study, walking 360 seconds of the 360 second test.  Since the previous study in July 2017, exercise capacity is significantly improved.  Based upon age and body mass index, exercise capacity is less than predicted.

## 2018-08-29 NOTE — PROGRESS NOTES
"Subjective:   Patient ID:  Suman Hayden is a 68 y.o. male who presents for LVAD followup visit.    Implant Date:7/27/17     HM 3 RPM: 5200  [place] [id number]     INR goal: 2-3   NO Bridge with Heparin   Antiplatelets: ASA stopped on 12/22/17 after GI bleed and ileal ulcer      GI Bleed: 8/17, 12/17, 1/18      TXP SHERRI INTERROGATIONS 8/29/2018   Type HeartMate3   Flow 3.2   Speed 5200   PI 7.4   Power ([place]) 3.6   LSL 4800   Pulsatility Pulse       HPI  Mr. Hayden is a  Very pleasant 68 BM with chronic systolic heart failure secondary to non ischemic cardiomyopathy underwent Heartmate  3 LVAD implanted as DT therapy 7/27/17. He underwent ICD revision on 11/20/17 with Dr. Vidal (DC ICD placed with active RA/LV leads (RV lead capped during revision) that was complicated by pocket hematoma. In January 2018 he developed staph epid bacteremia.  Post-op course further complicated by GI bleeding due to ilieal bleed that was APC'ed using double balloon enteroscopy by Dr. Chatterjee Dec 2017. His aspirin was stopped 12/22/17 and PPI increased to BID . He was re-admitted 1/25/17 where he had a double balloon enteroscopy without active bleed, but with ulceration, and subsequent retrograde double balloon enteroscopy on 1/26/18 which was unrevealing. Due to recurrent bleeding in March 2018 he underwent partial small bowel resection on 3/13/2018.  Since that time he has done well without recurrent bleeding. He was admitted June 2018 for ICD shock thought to be due to aflutter with abberrancy, treated with amiodarone. Was seen by EP Dr. Zee who did not see any further A. flutter episodes and recommend to continue monitoring with RFA as an option shsoudl the patient need it in the future.  VAD speed is at 5200 rpm. No VAD alarms noted on interrogation occasional PI events . BP is 89 (MAP) . DLES is a "1". INR is supratherapeutic at 3.3 . LDH is at baseline. Creatinine is up at 1.7 (baseline is 1.3) also BNP is up.      Review " "of Systems   Constitution: Negative. Negative for chills, decreased appetite, diaphoresis, fever, weakness, malaise/fatigue, night sweats, weight gain and weight loss.   Eyes: Negative.    Cardiovascular: Negative for chest pain, claudication, cyanosis, dyspnea on exertion, irregular heartbeat, leg swelling, near-syncope, orthopnea, palpitations, paroxysmal nocturnal dyspnea and syncope.   Respiratory: Negative for cough, hemoptysis and shortness of breath.    Endocrine: Negative.    Hematologic/Lymphatic: Negative.    Skin: Negative for color change, dry skin and nail changes.   Musculoskeletal: Negative.    Gastrointestinal: Negative.    Genitourinary: Negative.        Objective:   Blood pressure (!) 102/0, pulse 80, temperature 98.1 °F (36.7 °C), temperature source Oral, height 5' 9" (1.753 m), weight 69.9 kg (154 lb).body mass index is 22.74 kg/m².    Doppler: 102 (PULSATILE, MAP=89 mm of Hg)    Physical Exam   Constitutional: He appears well-developed.   BP (!) 102/0 (BP Location: Right arm, Patient Position: Sitting, BP Method: Medium (Manual)) Comment: doppler  Pulse 80   Temp 98.1 °F (36.7 °C) (Oral)   Ht 5' 9" (1.753 m)   Wt 69.9 kg (154 lb)   BMI 22.74 kg/m²      HENT:   Head: Normocephalic.   Neck: No JVD present. Carotid bruit is not present.   Cardiovascular: Regular rhythm and normal heart sounds.   No murmur heard.  Smooth VAD hum. DLES is "1"   Pulmonary/Chest: Effort normal and breath sounds normal. No respiratory distress. He has no wheezes. He has no rales.   Abdominal: Soft. Bowel sounds are normal. He exhibits no distension. There is no tenderness. There is no rebound.   Musculoskeletal: He exhibits no edema.   Neurological: He is alert.   Skin: Skin is warm.   Vitals reviewed.      Lab Results   Component Value Date    WBC 5.80 08/29/2018    HGB 11.0 (L) 08/29/2018    HCT 36.4 (L) 08/29/2018    MCV 85 08/29/2018     08/29/2018    CO2 25 08/29/2018    CREATININE 1.7 (H) 08/29/2018    " CALCIUM 9.9 08/29/2018    BILIRUTOT 0.3 01/15/2018    ALBUMIN 4.3 08/29/2018    AST 26 08/29/2018     (H) 08/29/2018    ALT 22 08/29/2018     08/29/2018       Lab Results   Component Value Date    INR 3.3 (H) 08/29/2018    INR 2.5 (H) 08/24/2018    INR 3.0 (H) 08/20/2018       BNP   Date Value Ref Range Status   08/29/2018 799 (H) 0 - 99 pg/mL Final     Comment:     Values of less than 100 pg/ml are consistent with non-CHF populations.   08/10/2018 254 (H) 0 - 99 pg/mL Final     Comment:     Values of less than 100 pg/ml are consistent with non-CHF populations.   08/08/2018 217 (H) 0 - 99 pg/mL Final     Comment:     Values of less than 100 pg/ml are consistent with non-CHF populations.       LD   Date Value Ref Range Status   08/29/2018 235 110 - 260 U/L Final     Comment:     Results are increased in hemolyzed samples.   08/13/2018 181 110 - 260 U/L Final     Comment:     Results are increased in hemolyzed samples.   08/11/2018 266 (H) 110 - 260 U/L Final     Comment:     Results are increased in hemolyzed samples.  *Result may be interfered by visible hemolysis         Assessment:      1. LVAD (left ventricular assist device) present    2. Chronic systolic congestive heart failure    3. ICD (implantable cardioverter-defibrillator) discharge    4. Essential hypertension        Plan:   Patient is now NYHA class II. BP is controlled.    Creatinine is up and BNP is up. Increase Lasix to 40 mg BID x2 days  Recheck BMP and BNP Tuesday  INR is supratherapeutic. Coumadin clinic to adjust his dose.  VAD interrogation was performed in clinic  Any VAD management kits dispensed today medically necessary  Recommend 2 gram sodium restriction and 1500cc fluid restriction.  Encourage physical activity with graded exercise program.  Requested patient to weigh themselves daily, and to notify us if their weight increases by more than 3 lbs in 1 day or 5 lbs in 1 week.     Listed for transplant: No but getting  reevaluated    UNOS Patient Status  Functional Status: 80% - Normal activity with effort: some symptoms of disease  Physical Capacity: No Limitations  Working for Income: Unknown    Mohit Ambrosio MD
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
## 2018-08-29 NOTE — PROGRESS NOTES
Wife questioned and confirmed dose .  Reports patient having seafood gumbo.  Taking benadryl  for itching .  No other changes reported

## 2018-08-29 NOTE — PROCEDURES
TXP SHERRI INTERROGATIONS 8/29/2018 8/11/2018 8/11/2018 8/10/2018 8/10/2018 8/9/2018 8/9/2018   Type HeartMate3 - - - - - -   Flow 3.2 - - - - - -   Speed 5200 - - - - - -   PI 7.4 - - - - - -   Power ([place]) 3.6 - - - - - -   LSL 4800 - - - - - -   Pulsatility Pulse Intermittent pulse Intermittent pulse Intermittent pulse Intermittent pulse Intermittent pulse Intermittent pulse   }

## 2018-08-30 NOTE — PROGRESS NOTES
"FOLLOW-UP to TRANSPLANT RECIPIENT ADULT PSYCHOSOCIAL ASSESSMENT conducted on 5/9/18:    ARINA met with pt and wife Kia in clinic today to f/u on finances prior to transplant listing.  Pt and caregiver both present as aao x4, calm, cooperative, and asking and answering questions appropriately.  Pt and wife reported during assessment in May that pt's disability income had decreased and wife's SSI had not yet started, which placed them under financial strain.  Pt and wife report today that wife is now receiving her SSI income and their monthly household income is back to about what it was previously.  Pt receives $912/mth in [employer] and wife receives $812/mth in SSI.    Wife reports she started pt's Medicaid application about 2 months ago and they are still waiting for a determination.  Wife reports that Medicaid needs additional information on pt's medical bills before determination is made.  Wife states they have not yet applied for food stamps because "it's like an act of Congress" to complete application, but states she plans to apply soon.    Per financial emma Pacheco, pt will have $1,500 annual OOP on BCBS secondary plan.  Pt and wife report more financial security today than during assessment in May; however, report they would have difficulty affording $1,500 annual OOP without Medicaid.  Pt and wife verbalize understanding and agreement with need to receive determination on Medicaid application prior to transplant listing.  Pt and wife agree to notify HTS/VAD team once they receive determination.    ARINA reviewed caregiver support needed post-transplant with pt and wife in clinic today, including need for 24/7 caregiver during local post-transplant stay.  ARINA completed "My Post-Transplant Plan" worksheet with pt and wife today and provided pt with copy.  ARINA met with pt's backup caregiver, dgtr Rayna Snow, in clinic on 8/15/18 for caregiver education.  Primary caregiver for transplant is wife Kia " and backup caregivers are dgtr Rayna Frazier, son-in-law Max Frazier, and son Boni Hayden.  See SW note from 8/15/18 for contact information for all caregivers.      SUITABILITY: Patient does NOT present as a suitable candidate for transplant at this time due to financial concerns.  Pt and wife report concerns about affording $1,500 annual OOP on pt's BCBS secondary plan.  Pt applied for Medicaid about 2 months ago and is awaiting determination.  Wife also reports plans to apply for food stamps soon.  Based on psychosocial risk factors, patient presents as medium risk due to limited finances and concerns about home environment.  Dgtr has reported that pt's home can be very cluttered at times, but is not unclean or dirty.    Since LVAD implant, pt and wife have demonstrated adequate coping.  Pt and wife have experienced increased distress at times and have experienced some relationship issues due to this distress, but report coping better at recent visits.  Pt and wife both report coping well today and appear to be in good spirits.  Dgtr Rayna reports pt has strong backup caregiver plan in dgtr and son-in-law.    Recommendations: Pt and wife to notify HTS/VAD team once pt receives determination on Medicaid application.  Recommend pt/wife also apply for food stamps to assist with monthly expenses.  SW will f/u with pt at next VAD clinic visit (9/26) for update on Medicaid application.        Heriberto Wells LCSW

## 2018-09-04 NOTE — TELEPHONE ENCOUNTER
9/4/18: Called pt to give him his lab results and to confirm lasix dose.  No answer, left a message to call me back.   9/5/18:  Call patient again to review lab results with them and confirm lasix dose.  No answer.  Left a message to call me back.   9/10/18:  Mrs. Hayden called me back for lab results from last week.

## 2018-09-05 NOTE — TELEPHONE ENCOUNTER
Spoke with patient'S WIFE- provided potential date for EP procedure. Patient'S WIFE- in agreement. Will sent instructions via portal and mail.     Alyssa GASTELUM CCM

## 2018-09-05 NOTE — PROGRESS NOTES
TRANSESOPHAGEAL ECHO ([facility]) INSTRUCTIONS    You have been scheduled for a procedure in the echo lab on 10-9-18 WITH ABLATION PROCEDURE.         You may not have anything to eat or drink after midnight the night before your test.     Medications:  You may take your regular morning medications with water.     The procedure will take 1-2 hours to perform. After the procedure, you will return to SSCU on the third floor of the hospital. Your family may remain in the room with you during this time.      You will be discharged home that same afternoon. You must have someone to drive you home.  Your doctor will determine, based on your progress, the time of your discharge. The results of your procedure will be discussed with you before you are discharged.      You will be contacted by the echo department prior to your procedure for a  pre-procedure questionnaire.     Any need to reschedule or cancel procedures, or any questions regarding your procedures should be addressed directly with the Arrhythmia Department Nurses at the following phone number: [phone].    Directions to [facility] Waiting Area:  Directions for Reporting to [facility]  If you park in the Parking Garage:  Take elevators to the 2nd floor  Walk up ramp and turn right by [address]  Walk long mckeon around to front of hospital to area with windows overlooking [facility]  Check in at Reception Desk

## 2018-09-05 NOTE — PROGRESS NOTES
ABLATION EDUCATION CHECKLIST    PRE-PROCEDURE TESTING:    10-4-18 @ 8 AM  Pre-Procedure labs have been ordered for you at:  [facility]   · Be sure to arrive at your scheduled time. YOU DO NOT HAVE TO FAST FOR THIS LAB WORK!    DAY OF PROCEDURE:    10-9-18 @ 8 AM  Report to Cardiology Waiting Room on 3rd floor of the Hospital    · Do not eat or drink anything after: 12 mn on the night before your procedure  · Please do not wear makeup (especially mascara) when arriving for your procedure    Medications:   CONTINUE TO TAKE COUMADIN  · You may take your other usual morning medications with a sip of water      Directions to the Cardiology Waiting Room  Directions for Reporting to [facility]  If you park in the Parking Garage:  Take elevators to the 2nd floor  Walk up ramp and turn right by [address] Ele[address]  Walk long mckeon around to front of hospital to area with windows overlooking [facility]  Check in at Reception Desk      · You will be spending the night after your procedure.  · You will need someone to drive you home the day after your procedure.  · Your pain during your procedure will be managed by the anesthesia team.     Any need to reschedule or cancel procedures, or any questions regarding your procedures should be addressed directly with the Arrhythmia Department Nurses at the following phone number: [phone]

## 2018-09-06 NOTE — PROGRESS NOTES
"Message sent from BALDEMAR Valenzuela: "Patient scheduled for upcoming procedure within the EP cath lab- A FLUTTER ABLATION ON 10-9-18 with . He would like his INR's to stay within range."  "

## 2018-09-06 NOTE — PROGRESS NOTES
Wife questioned and confirmed dose.  Reports pt had a small slip 9/5,but caught himself.  Denies any other changes

## 2018-09-25 PROBLEM — R07.9 CHEST PAIN: Status: ACTIVE | Noted: 2018-01-01

## 2018-09-25 NOTE — ASSESSMENT & PLAN NOTE
, Successfully overdrived paced  Continue Amiodarone  Start Mexiletine 200mg BID  EP consult to follow

## 2018-09-25 NOTE — HPI
68 BM with NICM, S/P Heartmate 3 as DT therapy 7/27/17, ICD revision 11/20/17 with Dr. Vidal (DC ICD placed with active RA/LV leads (RV lead capped during revision) that was complicated by pocket hematoma, who presented to the ER this morning with the chief complaint of weakness. On arrival his pulse was noted to be 200 and appeared consistent with VT on telemetry. He states that he woke up early this morning(around 4am) with palpitations. Reports associated diaphoresis, lightheadedness and bubbling sensation in the stomach with nausea and 3 episodes of emesis. States that he threw up his morning medications, vomit appeared yellowish without any blood or coffee-ground appearance. Cards Fellow came to bedside and interrogated ICD. He was paced out successfully. He is currently nsr with rate ~80. He states that he is feeling much better now. Lying in bed at about 15 degree angle in nad.

## 2018-09-25 NOTE — ED NOTES
Pt presented to the ED via [facility]. Pt woke up this morning in a cold sweats, pt c/o pressure to the left chest wall. Pt has a LVAD. Pt c/o diarrhea. Pt c/o diffuse abdominal pain. Pt denies fever. Pt states his lvad was alarming. Family at the bedside.

## 2018-09-25 NOTE — H&P
[facility]  Heart Transplant  H&P    Patient Name: Suman Hayden  MRN: [id number]  Admission Date: 9/25/2018  Attending Physician: Xochilt Workman MD  Primary Care Provider: Joe Ernst MD  Principal Problem:VT (ventricular tachycardia)    Subjective:     History of Present Illness:  68 BM with NICM, S/P Heartmate 3 as DT therapy 7/27/17, ICD revision 11/20/17 with Dr. Vidal (DC ICD placed with active RA/LV leads (RV lead capped during revision) that was complicated by pocket hematoma, who presented to the ER this morning with the chief complaint of weakness. On arrival his pulse was noted to be 200 and appeared consistent with VT on telemetry. He states that he woke up early this morning(around 4am) with palpitations. Reports associated diaphoresis, lightheadedness and bubbling sensation in the stomach with nausea and 3 episodes of emesis. States that he threw up his morning medications, vomit appeared yellowish without any blood or coffee-ground appearance. Cards Fellow came to bedside and interrogated ICD. He was paced out successfully. He is currently nsr with rate ~80. He states that he is feeling much better now. Lying in bed at about 15 degree angle in nad.           Past Medical History:   Diagnosis Date    Arthritis     Cardiomyopathy     CHF (congestive heart failure)     Coronary artery disease     Encounter for blood transfusion     Gastric polyp     Hyperlipidemia     Hypertension     Hypotension, iatrogenic     LVAD (left ventricular assist device) present     Obesity     S/P implantation of automatic cardioverter/defibrillator (AICD)     Ventricular tachycardia        Past Surgical History:   Procedure Laterality Date    ABDOMINAL SURGERY      APPENDECTOMY      CARDIAC CATHETERIZATION      CARDIAC DEFIBRILLATOR PLACEMENT      CARDIAC DEFIBRILLATOR PLACEMENT      CLOSURE-STERNAL WOUND N/A 7/28/2017    Performed by Sunny Downing MD at [facility] OR [address] [place] [address]     COLONOSCOPY      COLONOSCOPY N/A 3/9/2018    Procedure: COLONOSCOPY;  Surgeon: Andres Chatterjee MD;  Location: [facility] ENDO (2ND FLR);  Service: Endoscopy;  Laterality: N/A;    COLONOSCOPY N/A 8/8/2018    Procedure: COLONOSCOPY;  Surgeon: Andres Chatterjee MD;  Location: [facility] ENDO (2ND FLR);  Service: Endoscopy;  Laterality: N/A;    COLONOSCOPY N/A 8/8/2018    Performed by Andres Chatterjee MD at [facility] ENDO (2ND FLR)    COLONOSCOPY N/A 3/9/2018    Performed by Anders Chatterjee MD at [facility] (2ND FLR)    DEVICE ASSISTED ENTEROSCOPY-ANTEROGRADE N/A 1/25/2018    Performed by Andres Chatterjee MD at [facility] (2ND FLR)    DEVICE ASSISTED ENTEROSCOPY-ANTEROGRADE N/A 12/14/2017    Performed by Andres Chatterjee MD at [facility] (2ND FLR)    EGD (ESOPHAGOGASTRODUODENOSCOPY) N/A 8/8/2018    Performed by Andres Chatterjee MD at [facility] (2ND FLR)    ESOPHAGOGASTRODUODENOSCOPY      ESOPHAGOGASTRODUODENOSCOPY N/A 8/8/2018    Procedure: EGD (ESOPHAGOGASTRODUODENOSCOPY);  Surgeon: Andres Chatterjee MD;  Location: [facility] (2ND FLR);  Service: Endoscopy;  Laterality: N/A;    ESOPHAGOGASTRODUODENOSCOPY (EGD) N/A 3/6/2018    Performed by Andres Chatterjee MD at [facility] ENDO (2ND FLR)    ESOPHAGOGASTRODUODENOSCOPY (EGD) N/A 12/8/2017    Performed by Gagan Barger MD at [facility] ENDO (2ND FLR)    ESOPHAGOGASTRODUODENOSCOPY (EGD) N/A 8/17/2017    Performed by Kishore Mills MD at [facility] ENDO (2ND FLR)    EXPLORATORY-LAPAROTOMY with small bowel resection  N/A 3/13/2018    Performed by Kenyon Tellez MD at [facility] OR 2ND FLR    HEART CATH-RIGHT Right 7/3/2017    Performed by Angie Torres MD at [facility] CATH LAB    INSERTION-LEFT VENTRICULAR ASSIST DEVICE N/A 7/27/2017    Performed by Sunny Downing MD at [facility] OR 2ND FLR    INSERTION-LEFT VENTRICULAR ASSIST DEVICE N/A 7/25/2017    Performed by Sunny Downing MD at [facility] OR 2ND FLR    LEFT VENTRICULAR ASSIST DEVICE  07/27/2017    PLACEMENT-NEOPERICARDIUM N/A 7/28/2017     Performed by [facility] Downing MD at [facility] OR 2ND FLR    RESECTION-BOWEL  3/13/2018    Performed by Kenyon Tellez MD at [facility] OR 2ND FLR    Retrograde deivce assisted enteroscopy N/A 1/26/2018    Performed by Jesse Davis MD at [facility] ENDO (2ND FLR)    REVISION-LEAD-ICD N/A 11/20/2017    Performed by Rubén Winchester MD at [facility] CATH LAB    TONSILLECTOMY      TRANSESOPHAGEAL ECHOCARDIOGRAM (GLENN) N/A 1/9/2018    Performed by [facility] Diagnostic Provider at [facility] CATH LAB       Review of patient's allergies indicates:   Allergen Reactions    Asa [aspirin] Other (See Comments)     Bleeding ulcer       Current Facility-Administered Medications   Medication    amiodarone tablet 400 mg    dronabinol capsule 5 mg    HYDROcodone-acetaminophen 5-325 mg per tablet 1 tablet    magnesium oxide tablet 400 mg    mexiletine capsule 200 mg    ondansetron disintegrating tablet 4 mg    pantoprazole EC tablet 40 mg    polyethylene glycol packet 17 g    pravastatin tablet 20 mg    spironolactone tablet 25 mg     Current Outpatient Medications   Medication Sig    amiodarone (PACERONE) 400 MG tablet Take 1 tablet (400 mg total) by mouth once daily.    dronabinol (MARINOL) 5 MG capsule TAKE ONE CAPSULE BY MOUTH 3 TIMES DAILY BEFORE MEALS    furosemide (LASIX) 20 MG tablet Take 1 tablet (20 mg total) by mouth 2 (two) times daily.    HYDROcodone-acetaminophen (NORCO) 5-325 mg per tablet Take 1 tablet by mouth every 6 (six) hours as needed for Pain.    magnesium oxide (MAG-OX) 400 mg tablet Take 1 tablet (400 mg total) by mouth once daily.    pantoprazole (PROTONIX) 40 MG tablet Take 1 tablet (40 mg total) by mouth once daily.    polyethylene glycol (GLYCOLAX) 17 gram PwPk Take 17 g by mouth daily as needed (constipation).    potassium chloride SA (K-DUR,KLOR-CON) 20 MEQ tablet Take 1 tablet (20 mEq total) by mouth once daily.    pravastatin (PRAVACHOL) 20 MG tablet Take 1 tablet (20 mg total) by mouth every  evening.    spironolactone (ALDACTONE) 25 MG tablet Take 1 tablet (25 mg total) by mouth once daily.    warfarin (COUMADIN) 5 MG tablet Take 1/2 tab (2.5 mg) orally daily     Family History     Problem Relation (Age of Onset)    Heart disease Mother, Father        Tobacco Use    Smoking status: Never Smoker    Smokeless tobacco: Never Used   Substance and Sexual Activity    Alcohol use: No    Drug use: No    Sexual activity: Not Currently     Review of Systems   Constitutional: Positive for fatigue.   Respiratory: Positive for shortness of breath.    Cardiovascular: Positive for palpitations.   Gastrointestinal: Positive for nausea and vomiting.   All other systems reviewed and are negative.    Objective:     Vital Signs (Most Recent):  Temp: 97.9 °F (36.6 °C) (09/25/18 1128)  Pulse: 88 (09/25/18 0946)  Resp: 16 (09/25/18 0946)  BP: (!) 88/0 (09/25/18 0946)  SpO2: 98 % (09/25/18 0946) Vital Signs (24h Range):  Temp:  [97.5 °F (36.4 °C)-97.9 °F (36.6 °C)] 97.9 °F (36.6 °C)  Pulse:  [88] 88  Resp:  [16] 16  SpO2:  [98 %] 98 %  BP: (88)/(0) 88/0     Patient Vitals for the past 72 hrs (Last 3 readings):   Weight   09/25/18 0946 69 kg (152 lb 1.9 oz)     Body mass index is 22.46 kg/m².    No intake or output data in the 24 hours ending 09/25/18 1438    Physical Exam   Constitutional: He is oriented to person, place, and time. He appears well-developed and well-nourished.   HENT:   Head: Normocephalic.   Eyes: Pupils are equal, round, and reactive to light.   Neck: Normal range of motion. Neck supple.   Cardiovascular: Normal rate and regular rhythm.   VAD hum.   Pulmonary/Chest: Effort normal and breath sounds normal.   Abdominal: Soft. Bowel sounds are normal.   Musculoskeletal: Normal range of motion.   Neurological: He is alert and oriented to person, place, and time.   Skin: Skin is warm and dry.   Psychiatric: He has a normal mood and affect. His behavior is normal.   Nursing note and vitals  reviewed.    Significant Labs:  CBC:  Recent Labs   Lab  09/25/18   1015   WBC  6.76   RBC  4.42*   HGB  11.0*   HCT  36.8*   PLT  149*   MCV  83   MCH  24.9*   MCHC  29.9*     BNP:  Recent Labs   Lab  09/25/18   1015   BNP  141*     CMP:  Recent Labs   Lab  09/25/18   1015   GLU  123*   CALCIUM  9.7   ALBUMIN  3.6   PROT  7.1   NA  135*   K  4.7   CO2  21*   CL  103   BUN  17   CREATININE  1.7*   ALKPHOS  127   ALT  99*   AST  173*   BILITOT  0.5      Coagulation:   No results for input(s): PT, INR, APTT in the last 168 hours.  LDH:  Recent Labs   Lab  09/25/18   1015   LDH  416*     Microbiology:  Microbiology Results (last 7 days)     ** No results found for the last 168 hours. **        I have reviewed all pertinent labs within the past 24 hours.    Diagnostic Results:  I have reviewed and interpreted all pertinent imaging results/findings within the past 24 hours.    Assessment/Plan:     * VT (ventricular tachycardia)    -Overdrive paced at bedside by Cards Fellow.   -Continue Amiodarone. Will add mexiletine per Dr Zee.  -EP Cx.         LVAD (left ventricular assist device) present    -S/P HM III DT 7/2017  -Speed 5200  -INR pending.   -Antiplatelets: No ASA secondary to recurrent GIB.    -LDH at baseline.   -Continue Lasix 20 BID.  -Will get echo.  -Admit to ICU overnight.     Procedure: Device Interrogation Including analysis of device parameters  Current Settings: Ventricular Assist Device    TXP LVAD INTERROGATIONS 8/29/2018 8/11/2018 8/11/2018 8/10/2018 8/10/2018 8/10/2018 8/10/2018   Type - HeartMate3 HeartMate3 HeartMate3 HeartMate3 - HeartMate3   Flow - 4.0 4.2 4.0 4.1 4.0 3.7   Speed - 5200 5200 5200 5250 5200 5200   PI - 3.5 3.0 3.5 3.5 3.9 4.2   Power ([place]) - 3.5 3.6 3.6 3.3 3.5 3.6   LSL - - - - - 4800 4800   Pulsatility Pulse Intermittent pulse Intermittent pulse - - - -           Uninterrupted Critical Care/Counseling Time (not including procedures): 30MN    Kishore Paz NP  Heart  Transplant  [facility]

## 2018-09-25 NOTE — SUBJECTIVE & OBJECTIVE
Past Medical History:   Diagnosis Date    Arthritis     Cardiomyopathy     CHF (congestive heart failure)     Coronary artery disease     Encounter for blood transfusion     Gastric polyp     Hyperlipidemia     Hypertension     Hypotension, iatrogenic     LVAD (left ventricular assist device) present     Obesity     S/P implantation of automatic cardioverter/defibrillator (AICD)     Ventricular tachycardia        Past Surgical History:   Procedure Laterality Date    ABDOMINAL SURGERY      APPENDECTOMY      CARDIAC CATHETERIZATION      CARDIAC DEFIBRILLATOR PLACEMENT      CARDIAC DEFIBRILLATOR PLACEMENT      CLOSURE-STERNAL WOUND N/A 7/28/2017    Performed by [facility]ing MD at [facility] OR 2ND FLR    COLONOSCOPY      COLONOSCOPY N/A 3/9/2018    Procedure: COLONOSCOPY;  Surgeon: Andres Chatterjee MD;  Location: [facility] (2ND FLR);  Service: Endoscopy;  Laterality: N/A;    COLONOSCOPY N/A 8/8/2018    Procedure: COLONOSCOPY;  Surgeon: Andres Chatterjee MD;  Location: [facility] (2ND FLR);  Service: Endoscopy;  Laterality: N/A;    COLONOSCOPY N/A 8/8/2018    Performed by Andres Chatterjee MD at [facility] ENDO (2ND FLR)    COLONOSCOPY N/A 3/9/2018    Performed by Andres Chatterjee MD at [facility] (2ND FLR)    DEVICE ASSISTED ENTEROSCOPY-ANTEROGRADE N/A 1/25/2018    Performed by Andres Chatterjee MD at [facility] (2ND FLR)    DEVICE ASSISTED ENTEROSCOPY-ANTEROGRADE N/A 12/14/2017    Performed by Andres Chatterjee MD at [facility] (2ND FLR)    EGD (ESOPHAGOGASTRODUODENOSCOPY) N/A 8/8/2018    Performed by Andres Chatterjee MD at [facility] (2ND FLR)    ESOPHAGOGASTRODUODENOSCOPY      ESOPHAGOGASTRODUODENOSCOPY N/A 8/8/2018    Procedure: EGD (ESOPHAGOGASTRODUODENOSCOPY);  Surgeon: Andres Chatterjee MD;  Location: [facility] (2ND FLR);  Service: Endoscopy;  Laterality: N/A;    ESOPHAGOGASTRODUODENOSCOPY (EGD) N/A 3/6/2018    Performed by Andres Chatterjee MD at [facility] (2ND FLR)    ESOPHAGOGASTRODUODENOSCOPY (EGD)  N/A 12/8/2017    Performed by Gagan Barger MD at [facility] ENDO (2ND FLR)    ESOPHAGOGASTRODUODENOSCOPY (EGD) N/A 8/17/2017    Performed by Kishore Mills MD at [facility] ENDO (2ND FLR)    EXPLORATORY-LAPAROTOMY with small bowel resection  N/A 3/13/2018    Performed by Kenyon Tellez MD at [facility] OR 2ND FLR    HEART CATH-RIGHT Right 7/3/2017    Performed by Angie Torres MD at [facility] CATH LAB    INSERTION-LEFT VENTRICULAR ASSIST DEVICE N/A 7/27/2017    Performed by Sunny Downing MD at [facility] OR 2ND FLR    INSERTION-LEFT VENTRICULAR ASSIST DEVICE N/A 7/25/2017    Performed by Sunny Downing MD at [facility] OR 2ND FLR    LEFT VENTRICULAR ASSIST DEVICE  07/27/2017    PLACEMENT-NEOPERICARDIUM N/A 7/28/2017    Performed by Sunny Downing MD at [facility] OR 2ND FLR    RESECTION-BOWEL  3/13/2018    Performed by Kenyon Tellez MD at [facility] OR 2ND FLR    Retrograde deivce assisted enteroscopy N/A 1/26/2018    Performed by Jesse Davis MD at [facility] ENDO (2ND FLR)    REVISION-LEAD-ICD N/A 11/20/2017    Performed by Rubén Winchester MD at [facility] CATH LAB    TONSILLECTOMY      TRANSESOPHAGEAL ECHOCARDIOGRAM (GLENN) N/A 1/9/2018    Performed by [facility] Diagnostic Provider at [facility] CATH LAB       Review of patient's allergies indicates:   Allergen Reactions    Asa [aspirin] Other (See Comments)     Bleeding ulcer       No current facility-administered medications on file prior to encounter.      Current Outpatient Medications on File Prior to Encounter   Medication Sig    amiodarone (PACERONE) 400 MG tablet Take 1 tablet (400 mg total) by mouth once daily.    dronabinol (MARINOL) 5 MG capsule TAKE ONE CAPSULE BY MOUTH 3 TIMES DAILY BEFORE MEALS    furosemide (LASIX) 20 MG tablet Take 1 tablet (20 mg total) by mouth 2 (two) times daily.    HYDROcodone-acetaminophen (NORCO) 5-325 mg per tablet Take 1 tablet by mouth every 6 (six) hours as needed for Pain.    magnesium oxide (MAG-OX) 400 mg tablet Take 1 tablet (400 mg  total) by mouth once daily.    pantoprazole (PROTONIX) 40 MG tablet Take 1 tablet (40 mg total) by mouth once daily.    polyethylene glycol (GLYCOLAX) 17 gram PwPk Take 17 g by mouth daily as needed (constipation).    potassium chloride SA (K-DUR,KLOR-CON) 20 MEQ tablet Take 1 tablet (20 mEq total) by mouth once daily.    pravastatin (PRAVACHOL) 20 MG tablet Take 1 tablet (20 mg total) by mouth every evening.    spironolactone (ALDACTONE) 25 MG tablet Take 1 tablet (25 mg total) by mouth once daily.    warfarin (COUMADIN) 5 MG tablet Take 1/2 tab (2.5 mg) orally daily     Family History     Problem Relation (Age of Onset)    Heart disease Mother, Father        Tobacco Use    Smoking status: Never Smoker    Smokeless tobacco: Never Used   Substance and Sexual Activity    Alcohol use: No    Drug use: No    Sexual activity: Not Currently     Review of Systems   Constitution: Negative for chills, diaphoresis, fever and weight loss.   HENT: Negative for congestion, hoarse voice and sore throat.    Eyes: Negative for blurred vision and double vision.   Cardiovascular: Positive for dyspnea on exertion. Negative for chest pain, orthopnea, palpitations and paroxysmal nocturnal dyspnea.   Respiratory: Negative for shortness of breath.    Endocrine: Negative for cold intolerance and heat intolerance.   Hematologic/Lymphatic: Does not bruise/bleed easily.   Gastrointestinal: Positive for nausea and vomiting. Negative for abdominal pain, constipation and diarrhea.   Genitourinary: Negative for dysuria.   Neurological: Negative for focal weakness and sensory change.     Objective:     Vital Signs (Most Recent):  Temp: 97.5 °F (36.4 °C) (09/25/18 0946)  Pulse: 88 (09/25/18 0946)  Resp: 16 (09/25/18 0946)  BP: (!) 88/0 (09/25/18 0946)  SpO2: 98 % (09/25/18 0946) Vital Signs (24h Range):  Temp:  [97.5 °F (36.4 °C)] 97.5 °F (36.4 °C)  Pulse:  [88] 88  Resp:  [16] 16  SpO2:  [98 %] 98 %  BP: (88)/(0) 88/0     Weight: 69 kg  (152 lb 1.9 oz)  Body mass index is 22.46 kg/m².    SpO2: 98 %  O2 Device (Oxygen Therapy): room air    No intake or output data in the 24 hours ending 09/25/18 1105    Lines/Drains/Airways     Line                 VAD 07/27/17 1312 Left ventricular assist device HeartMate 3 424 days                Physical Exam   Constitutional: He is oriented to person, place, and time. He appears well-developed and well-nourished.   HENT:   Head: Normocephalic and atraumatic.   Eyes: EOM are normal. Pupils are equal, round, and reactive to light.   Neck: No JVD present.   Cardiovascular: Tachycardia present.   LVAD hum   Pulmonary/Chest: Effort normal and breath sounds normal. No respiratory distress. He has no wheezes.   Abdominal: Soft. Bowel sounds are normal. He exhibits no distension.   Musculoskeletal: He exhibits no edema.   Neurological: He is alert and oriented to person, place, and time.   Skin: Skin is warm and dry.   Psychiatric: He has a normal mood and affect.       Significant Labs:   Recent Lab Results     None          Significant Imaging: Echocardiogram:   2D echo with color flow doppler:   Results for orders placed or performed during the hospital encounter of 07/12/18   Echo doppler color flow   Result Value Ref Range    EF 15 (A) 55 - 65    Mitral Valve Regurgitation MILD     Est. PA Systolic Pressure 33.64     Tricuspid Valve Regurgitation TRIVIAL

## 2018-09-25 NOTE — HPI
68 BM with chronic systolic heart failure secondary to non ischemic cardiomyopathy underwent Heartmate  3 LVAD implanted as DT therapy 7/27/17. He underwent ICD revision on 11/20/17 with Dr. Vidal (DC ICD placed with active RA/LV leads (RV lead capped during revision) that was complicated by pocket hematoma. He presented to the ER this morning with the chief complaint of weakness. On arrival his pulse was noted to be 200 and appeared consistent with VT on telemetry.    [employer] interrogator was used to pace him out of VT successfully. Burst 10 @ 91% R-R.

## 2018-09-25 NOTE — ED NOTES
The patient is awake, alert and cooperative with a calm affect, patient is aware of environment. Airway is open and patent, respirations are spontaneous, normal respiratory effort and rate noted, skin warm and dry, moves all extremities well, appearance: no apparent distress noted. Side rails x 2. Call bell within reach. Will continue to monitor. Pt and family updated on the current status.

## 2018-09-25 NOTE — TELEPHONE ENCOUNTER
Mrs. Hayden called to report Mr. Hayden is having an episode and Néstor is there with them now.  She reports he is going in and out of consciousness and he had a red heart alarm.  I asked her to ask them to take them to the closest ER now.  Mrs. Hayden verbalized agreement.    Shortly after I received a call from the [employer] center that Néstor is on the side of the road with pt.  I asked why are they stopped on the side of the road and how quickly can they get him to the nearest ER.  Officer called back and explained they are in route to [facility], but could not tell me why.  I called down to ER to notify them and asked if they could get a PM and monitor there, Bebe said he was here and they will go get it.  She was not sure of pt condition.   1030:  Walked down to ER to check on pt.  Cardiologists and ER nurses at bedside.  I connected pt to monitor, flows were 4.6.  Pt noted to be in VT and soon converted out of it by Cardiologists.  As soon as rhythm changed, flow returned to 3.6-4.0.  Waveforms recorded.  BP 96/72, slightly pulsatile. VAD sounds=HM 3.  Upon interrogation, noted to have several low flow alarms beginning at 0813 this morning.    Once all was said and done, I asked pt and wife about the decision to drive to [place] instead of going to local ER in [place].  They explained they were driving here for their appt when all of the sudden her had low flow alarms and his ICD fired.  They explained they pulled over and she called 911 from where they were.  I said that makes more sense than hearing Néstor pulled over.  All questions answered to pt and wife's satisfaction as evidence by verbal acknowledgement.

## 2018-09-25 NOTE — ASSESSMENT & PLAN NOTE
-Overdrive paced at bedside by Cards Fellow.   -Continue Amiodarone. Will add mexiletine per Dr Zee.  -EP Cx.

## 2018-09-25 NOTE — SUBJECTIVE & OBJECTIVE
Past Medical History:   Diagnosis Date    Arthritis     Cardiomyopathy     CHF (congestive heart failure)     Coronary artery disease     Encounter for blood transfusion     Gastric polyp     Hyperlipidemia     Hypertension     Hypotension, iatrogenic     LVAD (left ventricular assist device) present     Obesity     S/P implantation of automatic cardioverter/defibrillator (AICD)     Ventricular tachycardia        Past Surgical History:   Procedure Laterality Date    ABDOMINAL SURGERY      APPENDECTOMY      CARDIAC CATHETERIZATION      CARDIAC DEFIBRILLATOR PLACEMENT      CARDIAC DEFIBRILLATOR PLACEMENT      CLOSURE-STERNAL WOUND N/A 7/28/2017    Performed by [facility] Downing MD at [facility] OR 2ND FLR    COLONOSCOPY      COLONOSCOPY N/A 3/9/2018    Procedure: COLONOSCOPY;  Surgeon: Andres Chatterjee MD;  Location: [facility] (2ND FLR);  Service: Endoscopy;  Laterality: N/A;    COLONOSCOPY N/A 8/8/2018    Procedure: COLONOSCOPY;  Surgeon: Andres Chatterjee MD;  Location: [facility] (2ND FLR);  Service: Endoscopy;  Laterality: N/A;    COLONOSCOPY N/A 8/8/2018    Performed by Andres [facility]jee MD at [facility] ENDO (2ND FLR)    COLONOSCOPY N/A 3/9/2018    Performed by Andres Chatterjee MD at [facility] (2ND FLR)    DEVICE ASSISTED ENTEROSCOPY-ANTEROGRADE N/A 1/25/2018    Performed by Andres Chatterjee MD at [facility] (2ND FLR)    DEVICE ASSISTED ENTEROSCOPY-ANTEROGRADE N/A 12/14/2017    Performed by Andres Chatterjee MD at [facility] (2ND FLR)    EGD (ESOPHAGOGASTRODUODENOSCOPY) N/A 8/8/2018    Performed by Andres Chatterjee MD at [facility] (2ND FLR)    ESOPHAGOGASTRODUODENOSCOPY      ESOPHAGOGASTRODUODENOSCOPY N/A 8/8/2018    Procedure: EGD (ESOPHAGOGASTRODUODENOSCOPY);  Surgeon: Andres Chatterjee MD;  Location: [facility] (2ND FLR);  Service: Endoscopy;  Laterality: N/A;    ESOPHAGOGASTRODUODENOSCOPY (EGD) N/A 3/6/2018    Performed by Andres Chatterjee MD at [facility] (2ND FLR)    ESOPHAGOGASTRODUODENOSCOPY (EGD)  N/A 12/8/2017    Performed by Gagan Barger MD at [facility] ENDO (2ND FLR)    ESOPHAGOGASTRODUODENOSCOPY (EGD) N/A 8/17/2017    Performed by Kishore Mills MD at [facility] ENDO (2ND FLR)    EXPLORATORY-LAPAROTOMY with small bowel resection  N/A 3/13/2018    Performed by Kenyon Tellez MD at [facility] OR 2ND FLR    HEART CATH-RIGHT Right 7/3/2017    Performed by Angie Torres MD at [facility] CATH LAB    INSERTION-LEFT VENTRICULAR ASSIST DEVICE N/A 7/27/2017    Performed by Sunny Downing MD at [facility] OR 2ND FLR    INSERTION-LEFT VENTRICULAR ASSIST DEVICE N/A 7/25/2017    Performed by Sunny Downing MD at [facility] OR 2ND FLR    LEFT VENTRICULAR ASSIST DEVICE  07/27/2017    PLACEMENT-NEOPERICARDIUM N/A 7/28/2017    Performed by Sunny Downing MD at [facility] OR 2ND FLR    RESECTION-BOWEL  3/13/2018    Performed by Kenyon Tellez MD at [facility] OR 2ND FLR    Retrograde deivce assisted enteroscopy N/A 1/26/2018    Performed by Jesse Davis MD at [facility] ENDO (2ND FLR)    REVISION-LEAD-ICD N/A 11/20/2017    Performed by Rubén Winchester MD at [facility] CATH LAB    TONSILLECTOMY      TRANSESOPHAGEAL ECHOCARDIOGRAM (GLENN) N/A 1/9/2018    Performed by [facility] Diagnostic Provider at [facility] CATH LAB       Review of patient's allergies indicates:   Allergen Reactions    Asa [aspirin] Other (See Comments)     Bleeding ulcer       Current Facility-Administered Medications   Medication    amiodarone tablet 400 mg    dronabinol capsule 5 mg    HYDROcodone-acetaminophen 5-325 mg per tablet 1 tablet    magnesium oxide tablet 400 mg    mexiletine capsule 200 mg    ondansetron disintegrating tablet 4 mg    pantoprazole EC tablet 40 mg    polyethylene glycol packet 17 g    pravastatin tablet 20 mg    spironolactone tablet 25 mg     Current Outpatient Medications   Medication Sig    amiodarone (PACERONE) 400 MG tablet Take 1 tablet (400 mg total) by mouth once daily.    dronabinol (MARINOL) 5 MG capsule TAKE ONE CAPSULE BY MOUTH 3  TIMES DAILY BEFORE MEALS    furosemide (LASIX) 20 MG tablet Take 1 tablet (20 mg total) by mouth 2 (two) times daily.    HYDROcodone-acetaminophen (NORCO) 5-325 mg per tablet Take 1 tablet by mouth every 6 (six) hours as needed for Pain.    magnesium oxide (MAG-OX) 400 mg tablet Take 1 tablet (400 mg total) by mouth once daily.    pantoprazole (PROTONIX) 40 MG tablet Take 1 tablet (40 mg total) by mouth once daily.    polyethylene glycol (GLYCOLAX) 17 gram PwPk Take 17 g by mouth daily as needed (constipation).    potassium chloride SA (K-DUR,KLOR-CON) 20 MEQ tablet Take 1 tablet (20 mEq total) by mouth once daily.    pravastatin (PRAVACHOL) 20 MG tablet Take 1 tablet (20 mg total) by mouth every evening.    spironolactone (ALDACTONE) 25 MG tablet Take 1 tablet (25 mg total) by mouth once daily.    warfarin (COUMADIN) 5 MG tablet Take 1/2 tab (2.5 mg) orally daily     Family History     Problem Relation (Age of Onset)    Heart disease Mother, Father        Tobacco Use    Smoking status: Never Smoker    Smokeless tobacco: Never Used   Substance and Sexual Activity    Alcohol use: No    Drug use: No    Sexual activity: Not Currently     Review of Systems   Constitutional: Positive for fatigue.   Respiratory: Positive for shortness of breath.    Cardiovascular: Positive for palpitations.   Gastrointestinal: Positive for nausea and vomiting.   All other systems reviewed and are negative.    Objective:     Vital Signs (Most Recent):  Temp: 97.9 °F (36.6 °C) (09/25/18 1128)  Pulse: 88 (09/25/18 0946)  Resp: 16 (09/25/18 0946)  BP: (!) 88/0 (09/25/18 0946)  SpO2: 98 % (09/25/18 0946) Vital Signs (24h Range):  Temp:  [97.5 °F (36.4 °C)-97.9 °F (36.6 °C)] 97.9 °F (36.6 °C)  Pulse:  [88] 88  Resp:  [16] 16  SpO2:  [98 %] 98 %  BP: (88)/(0) 88/0     Patient Vitals for the past 72 hrs (Last 3 readings):   Weight   09/25/18 0946 69 kg (152 lb 1.9 oz)     Body mass index is 22.46 kg/m².    No intake or output data  in the 24 hours ending 09/25/18 1438    Physical Exam   Constitutional: He is oriented to person, place, and time. He appears well-developed and well-nourished.   HENT:   Head: Normocephalic.   Eyes: Pupils are equal, round, and reactive to light.   Neck: Normal range of motion. Neck supple.   Cardiovascular: Normal rate and regular rhythm.   VAD hum.   Pulmonary/Chest: Effort normal and breath sounds normal.   Abdominal: Soft. Bowel sounds are normal.   Musculoskeletal: Normal range of motion.   Neurological: He is alert and oriented to person, place, and time.   Skin: Skin is warm and dry.   Psychiatric: He has a normal mood and affect. His behavior is normal.   Nursing note and vitals reviewed.    Significant Labs:  CBC:  Recent Labs   Lab  09/25/18   1015   WBC  6.76   RBC  4.42*   HGB  11.0*   HCT  36.8*   PLT  149*   MCV  83   MCH  24.9*   MCHC  29.9*     BNP:  Recent Labs   Lab  09/25/18   1015   BNP  141*     CMP:  Recent Labs   Lab  09/25/18   1015   GLU  123*   CALCIUM  9.7   ALBUMIN  3.6   PROT  7.1   NA  135*   K  4.7   CO2  21*   CL  103   BUN  17   CREATININE  1.7*   ALKPHOS  127   ALT  99*   AST  173*   BILITOT  0.5      Coagulation:   No results for input(s): PT, INR, APTT in the last 168 hours.  LDH:  Recent Labs   Lab  09/25/18   1015   LDH  416*     Microbiology:  Microbiology Results (last 7 days)     ** No results found for the last 168 hours. **        I have reviewed all pertinent labs within the past 24 hours.    Diagnostic Results:  I have reviewed and interpreted all pertinent imaging results/findings within the past 24 hours.

## 2018-09-25 NOTE — CONSULTS
[facility]  Cardiology  Consult Note    Patient Name: Suman Hayden  MRN: [id number]  Admission Date: 9/25/2018  Hospital Length of Stay: 0 days  Code Status: Full Code   Attending Provider: Xochilt Workman MD   Consulting Provider: Maurice Ramos MD  Primary Care Physician: Joe Ernst MD  Principal Problem:<principal problem not specified>    Patient information was obtained from patient and ER records.     Inpatient consult to Cardiology  Consult performed by: Maurice Ramos MD  Consult ordered by: Daphnie Wagner PA-C        Subjective:     Chief Complaint:  VT     HPI:   68 BM with chronic systolic heart failure secondary to non ischemic cardiomyopathy underwent Heartmate  3 LVAD implanted as DT therapy 7/27/17. He underwent ICD revision on 11/20/17 with Dr. Vidal (DC ICD placed with active RA/LV leads (RV lead capped during revision) that was complicated by pocket hematoma. He presented to the ER this morning with the chief complaint of weakness. On arrival his pulse was noted to be 200 and appeared consistent with VT on telemetry.    Medtronic interrogator was used to pace him out of VT successfully. Burst 10 @ 91% R-R.     Parameters:  VF, Monitor: ON >240bpm, Therapies: ATP during charging, 35J x 6  FVT, Monitor: OFF  VT, Monitor: -240bpm, Therapies: Burst(3), Burst(3)            Past Medical History:   Diagnosis Date    Arthritis     Cardiomyopathy     CHF (congestive heart failure)     Coronary artery disease     Encounter for blood transfusion     Gastric polyp     Hyperlipidemia     Hypertension     Hypotension, iatrogenic     LVAD (left ventricular assist device) present     Obesity     S/P implantation of automatic cardioverter/defibrillator (AICD)     Ventricular tachycardia        Past Surgical History:   Procedure Laterality Date    ABDOMINAL SURGERY      APPENDECTOMY      CARDIAC CATHETERIZATION      CARDIAC DEFIBRILLATOR PLACEMENT       CARDIAC DEFIBRILLATOR PLACEMENT      CLOSURE-STERNAL WOUND N/A 7/28/2017    Performed by Sunny Downing MD at [facility] OR 2ND FLR    COLONOSCOPY      COLONOSCOPY N/A 3/9/2018    Procedure: COLONOSCOPY;  Surgeon: Andres Chatterjee MD;  Location: [facility] (2ND FLR);  Service: Endoscopy;  Laterality: N/A;    COLONOSCOPY N/A 8/8/2018    Procedure: COLONOSCOPY;  Surgeon: Andres Chatterjee MD;  Location: [facility] (2ND FLR);  Service: Endoscopy;  Laterality: N/A;    COLONOSCOPY N/A 8/8/2018    Performed by Andres Chatterjee MD at [facility] ENDO (2ND FLR)    COLONOSCOPY N/A 3/9/2018    Performed by Andres Chatterjee MD at [facility] (2ND FLR)    DEVICE ASSISTED ENTEROSCOPY-ANTEROGRADE N/A 1/25/2018    Performed by Andres Chatterjee MD at [facility] (2ND FLR)    DEVICE ASSISTED ENTEROSCOPY-ANTEROGRADE N/A 12/14/2017    Performed by Andres Chatterjee MD at [facility] (2ND FLR)    EGD (ESOPHAGOGASTRODUODENOSCOPY) N/A 8/8/2018    Performed by Andres Chatterjee MD at [facility] (2ND FLR)    ESOPHAGOGASTRODUODENOSCOPY      ESOPHAGOGASTRODUODENOSCOPY N/A 8/8/2018    Procedure: EGD (ESOPHAGOGASTRODUODENOSCOPY);  Surgeon: Andres Chatterjee MD;  Location: [facility] (2ND FLR);  Service: Endoscopy;  Laterality: N/A;    ESOPHAGOGASTRODUODENOSCOPY (EGD) N/A 3/6/2018    Performed by Andres Chatterjee MD at [facility] ENDO (2ND FLR)    ESOPHAGOGASTRODUODENOSCOPY (EGD) N/A 12/8/2017    Performed by Gagan Barger MD at [facility] ENDO (2ND FLR)    ESOPHAGOGASTRODUODENOSCOPY (EGD) N/A 8/17/2017    Performed by Kishore Mills MD at [facility] (2ND FLR)    EXPLORATORY-LAPAROTOMY with small bowel resection  N/A 3/13/2018    Performed by Kenyon Tellez MD at [facility] OR 2ND FLR    HEART CATH-RIGHT Right 7/3/2017    Performed by Angie Torres MD at [facility] CATH LAB    INSERTION-LEFT VENTRICULAR ASSIST DEVICE N/A 7/27/2017    Performed by Sunny Downing MD at [facility] OR 2ND FLR    INSERTION-LEFT VENTRICULAR ASSIST DEVICE N/A 7/25/2017    Performed by Sunny  MD [place] [facility] OR 2ND FLR    LEFT VENTRICULAR ASSIST DEVICE  07/27/2017    PLACEMENT-NEOPERICARDIUM N/A 7/28/2017    Performed by Sunny Downing MD at [facility] OR 2ND FLR    RESECTION-BOWEL  3/13/2018    Performed by Kenyon Tellez MD at [facility] OR 2ND FLR    Retrograde deivce assisted enteroscopy N/A 1/26/2018    Performed by Jesse Davis MD at [facility] ENDO (2ND FLR)    REVISION-LEAD-ICD N/A 11/20/2017    Performed by Rubén Winchester MD at [facility] CATH LAB    TONSILLECTOMY      TRANSESOPHAGEAL ECHOCARDIOGRAM (GLENN) N/A 1/9/2018    Performed by [facility] Diagnostic Provider at [facility] CATH LAB       Review of patient's allergies indicates:   Allergen Reactions    Asa [aspirin] Other (See Comments)     Bleeding ulcer       No current facility-administered medications on file prior to encounter.      Current Outpatient Medications on File Prior to Encounter   Medication Sig    amiodarone (PACERONE) 400 MG tablet Take 1 tablet (400 mg total) by mouth once daily.    dronabinol (MARINOL) 5 MG capsule TAKE ONE CAPSULE BY MOUTH 3 TIMES DAILY BEFORE MEALS    furosemide (LASIX) 20 MG tablet Take 1 tablet (20 mg total) by mouth 2 (two) times daily.    HYDROcodone-acetaminophen (NORCO) 5-325 mg per tablet Take 1 tablet by mouth every 6 (six) hours as needed for Pain.    magnesium oxide (MAG-OX) 400 mg tablet Take 1 tablet (400 mg total) by mouth once daily.    pantoprazole (PROTONIX) 40 MG tablet Take 1 tablet (40 mg total) by mouth once daily.    polyethylene glycol (GLYCOLAX) 17 gram PwPk Take 17 g by mouth daily as needed (constipation).    potassium chloride SA (K-DUR,KLOR-CON) 20 MEQ tablet Take 1 tablet (20 mEq total) by mouth once daily.    pravastatin (PRAVACHOL) 20 MG tablet Take 1 tablet (20 mg total) by mouth every evening.    spironolactone (ALDACTONE) 25 MG tablet Take 1 tablet (25 mg total) by mouth once daily.    warfarin (COUMADIN) 5 MG tablet Take 1/2 tab (2.5 mg) orally daily     Family  History     Problem Relation (Age of Onset)    Heart disease Mother, Father        Tobacco Use    Smoking status: Never Smoker    Smokeless tobacco: Never Used   Substance and Sexual Activity    Alcohol use: No    Drug use: No    Sexual activity: Not Currently     Review of Systems   Constitution: Negative for chills, diaphoresis, fever and weight loss.   HENT: Negative for congestion, hoarse voice and sore throat.    Eyes: Negative for blurred vision and double vision.   Cardiovascular: Positive for dyspnea on exertion. Negative for chest pain, orthopnea, palpitations and paroxysmal nocturnal dyspnea.   Respiratory: Negative for shortness of breath.    Endocrine: Negative for cold intolerance and heat intolerance.   Hematologic/Lymphatic: Does not bruise/bleed easily.   Gastrointestinal: Positive for nausea and vomiting. Negative for abdominal pain, constipation and diarrhea.   Genitourinary: Negative for dysuria.   Neurological: Negative for focal weakness and sensory change.     Objective:     Vital Signs (Most Recent):  Temp: 97.5 °F (36.4 °C) (09/25/18 0946)  Pulse: 88 (09/25/18 0946)  Resp: 16 (09/25/18 0946)  BP: (!) 88/0 (09/25/18 0946)  SpO2: 98 % (09/25/18 0946) Vital Signs (24h Range):  Temp:  [97.5 °F (36.4 °C)] 97.5 °F (36.4 °C)  Pulse:  [88] 88  Resp:  [16] 16  SpO2:  [98 %] 98 %  BP: (88)/(0) 88/0     Weight: 69 kg (152 lb 1.9 oz)  Body mass index is 22.46 kg/m².    SpO2: 98 %  O2 Device (Oxygen Therapy): room air    No intake or output data in the 24 hours ending 09/25/18 1105    Lines/Drains/Airways     Line                 VAD 07/27/17 1312 Left ventricular assist device HeartMate 3 424 days                Physical Exam   Constitutional: He is oriented to person, place, and time. He appears well-developed and well-nourished.   HENT:   Head: Normocephalic and atraumatic.   Eyes: EOM are normal. Pupils are equal, round, and reactive to light.   Neck: No JVD present.   Cardiovascular: Tachycardia  present.   LVAD hum   Pulmonary/Chest: Effort normal and breath sounds normal. No respiratory distress. He has no wheezes.   Abdominal: Soft. Bowel sounds are normal. He exhibits no distension.   Musculoskeletal: He exhibits no edema.   Neurological: He is alert and oriented to person, place, and time.   Skin: Skin is warm and dry.   Psychiatric: He has a normal mood and affect.       Significant Labs:   Recent Lab Results     None          Significant Imaging: Echocardiogram:   2D echo with color flow doppler:   Results for orders placed or performed during the hospital encounter of 07/12/18   Echo doppler color flow   Result Value Ref Range    EF 15 (A) 55 - 65    Mitral Valve Regurgitation MILD     Est. PA Systolic Pressure 33.64     Tricuspid Valve Regurgitation TRIVIAL              Assessment and Plan:     VT (ventricular tachycardia)    , Successfully overdrived paced  Continue Amiodarone  Start Mexiletine 200mg BID  EP consult to follow                VTE Risk Mitigation (From admission, onward)    None        Thank you for your consult.    Maurice Ramos DO  Cardiology Fellow, PGY-6    Cardiology   [facility]

## 2018-09-25 NOTE — ED NOTES
Pt identifiers Suman Hayden were checked and correct  LOC: The patient is awake, alert, aware of environment with an appropriate affect. Oriented x3, speaking appropriately  APPEARANCE: Pt resting comfortably, in no acute distress, pt is clean and well groomed, clothing properly fastened  SKIN: Skin warm, dry and intact, normal skin turgor, moist mucus membranes  RESPIRATORY: Airway is open and patent, respirations are spontaneous, even and unlabored, normal effort and rate  CARDIAC: Normal rate and rhythm, no peripheral edema noted, capillary refill < 3 seconds, bilateral radial pulses 2+, pt c/o left chest wall pressure.   ABDOMEN: Soft, non tender, non distended. Bowel sounds present x 4 quadrants. Pt c/o diffuse abdominal pain.   NEUROLOGIC: PERRL, facial expression is symmetrical, patient moving all extremities spontaneously, normal sensation in all extremities when touched with a finger.  Follows all commands appropriately  MUSCULOSKELETAL: No obvious deformities.

## 2018-09-25 NOTE — ED PROVIDER NOTES
Encounter Date: 9/25/2018       History     Chief Complaint   Patient presents with    Chest Pain     LVAD patient, abdominal pain, chest pain, vomited twice     68-year-old male with multiple comorbidities including NICM with LVAD, CAD, HTN, history of GI bleed on Coumadin presents for chest pain and nausea/vomiting since this a.m..  Patient reports left-sided chest pressure starting at 4:00 a.m. this morning.  Pain resolved without therapy.  Reports associated diaphoresis, lightheadedness and bubbling sensation in the stomach with nausea and 3 episodes of emesis.  States that he threw up his morning medications, vomit appeared yellowish without any blood or coffee-ground appearance.  Denies abdominal pain, change in bowel movements, bloody or dark stool, shortness of breath, leg swelling, fevers or cough.          Review of patient's allergies indicates:   Allergen Reactions    Asa [aspirin] Other (See Comments)     Bleeding ulcer     Past Medical History:   Diagnosis Date    Arthritis     Cardiomyopathy     CHF (congestive heart failure)     Coronary artery disease     Encounter for blood transfusion     Gastric polyp     Hyperlipidemia     Hypertension     Hypotension, iatrogenic     LVAD (left ventricular assist device) present     Obesity     S/P implantation of automatic cardioverter/defibrillator (AICD)     Ventricular tachycardia      Past Surgical History:   Procedure Laterality Date    ABDOMINAL SURGERY      APPENDECTOMY      CARDIAC CATHETERIZATION      CARDIAC DEFIBRILLATOR PLACEMENT      CARDIAC DEFIBRILLATOR PLACEMENT      CLOSURE-STERNAL WOUND N/A 7/28/2017    Performed by Sunny Downing MD at [facility] OR [address] [place] [address]    COLONOSCOPY      COLONOSCOPY N/A 3/9/2018    Procedure: COLONOSCOPY;  Surgeon: Andres Chatterjee MD;  Location: [facility] ([address] [place] [address]);  Service: Endoscopy;  Laterality: N/A;    COLONOSCOPY N/A 8/8/2018    Procedure: COLONOSCOPY;  Surgeon: Andres Chatterjee MD;  Location:  [facility] ENDO (2ND FLR);  Service: Endoscopy;  Laterality: N/A;    COLONOSCOPY N/A 8/8/2018    Performed by Andres Chatterjee MD at [facility] ENDO (2ND FLR)    COLONOSCOPY N/A 3/9/2018    Performed by Andres Chatterjee MD at [facility] ENDO (2ND FLR)    DEVICE ASSISTED ENTEROSCOPY-ANTEROGRADE N/A 1/25/2018    Performed by Andres Chatterjee MD at [facility] ENDO (2ND FLR)    DEVICE ASSISTED ENTEROSCOPY-ANTEROGRADE N/A 12/14/2017    Performed by Andres Chatterjee MD at [facility] ENDO (2ND FLR)    EGD (ESOPHAGOGASTRODUODENOSCOPY) N/A 8/8/2018    Performed by Andres Chatterjee MD at [facility] ENDO (2ND FLR)    ESOPHAGOGASTRODUODENOSCOPY      ESOPHAGOGASTRODUODENOSCOPY N/A 8/8/2018    Procedure: EGD (ESOPHAGOGASTRODUODENOSCOPY);  Surgeon: Andres Chatterjee MD;  Location: [facility] (2ND FLR);  Service: Endoscopy;  Laterality: N/A;    ESOPHAGOGASTRODUODENOSCOPY (EGD) N/A 3/6/2018    Performed by Andres Chatterjee MD at [facility] ENDO (2ND FLR)    ESOPHAGOGASTRODUODENOSCOPY (EGD) N/A 12/8/2017    Performed by Gagan Barger MD at [facility] ENDO (2ND FLR)    ESOPHAGOGASTRODUODENOSCOPY (EGD) N/A 8/17/2017    Performed by Kishore Mills MD at [facility] ENDO (2ND FLR)    EXPLORATORY-LAPAROTOMY with small bowel resection  N/A 3/13/2018    Performed by Kenyon Tellez MD at [facility] OR 2ND FLR    HEART CATH-RIGHT Right 7/3/2017    Performed by Angie Torres MD at [facility] CATH LAB    INSERTION-LEFT VENTRICULAR ASSIST DEVICE N/A 7/27/2017    Performed by Sunny Downing MD at [facility] OR 2ND FLR    INSERTION-LEFT VENTRICULAR ASSIST DEVICE N/A 7/25/2017    Performed by Sunny Downing MD at [facility] OR 2ND FLR    LEFT VENTRICULAR ASSIST DEVICE  07/27/2017    PLACEMENT-NEOPERICARDIUM N/A 7/28/2017    Performed by Sunny Downing MD at [facility] OR 2ND FLR    RESECTION-BOWEL  3/13/2018    Performed by Kenyon Tellez MD at [facility] OR 2ND FLR    Retrograde deivce assisted enteroscopy N/A 1/26/2018    Performed by Jesse Davis MD at [facility] ENDO (2ND FLR)     REVISION-LEAD-ICD N/A 11/20/2017    Performed by Rubén Winchester MD at [facility] CATH LAB    TONSILLECTOMY      TRANSESOPHAGEAL ECHOCARDIOGRAM (GLENN) N/A 1/9/2018    Performed by [facility] Diagnostic Provider at [facility] CATH LAB     Family History   Problem Relation Age of Onset    Heart disease Mother     Heart disease Father      Social History     Tobacco Use    Smoking status: Never Smoker    Smokeless tobacco: Never Used   Substance Use Topics    Alcohol use: No    Drug use: No     Review of Systems   Constitutional: Positive for diaphoresis. Negative for chills, fatigue and fever.   Respiratory: Positive for chest tightness. Negative for cough and shortness of breath.    Cardiovascular: Positive for chest pain. Negative for palpitations and leg swelling.   Gastrointestinal: Positive for nausea and vomiting. Negative for abdominal distention, abdominal pain, anal bleeding, blood in stool, constipation, diarrhea and rectal pain.   Endocrine: Negative for polyuria.   Genitourinary: Negative for difficulty urinating, dysuria, flank pain, frequency, hematuria and urgency.   Musculoskeletal: Negative for back pain and myalgias.   Skin: Negative for color change and pallor.   Neurological: Positive for light-headedness. Negative for dizziness and headaches.   Hematological: Does not bruise/bleed easily.       Physical Exam     Initial Vitals [09/25/18 0946]   BP Pulse Resp Temp SpO2   (!) 88/0 88 16 97.5 °F (36.4 °C) 98 %      MAP       --         Physical Exam    Nursing note and vitals reviewed.  Constitutional: He is not diaphoretic.  Non-toxic appearance. No distress.   Family at bedside.   HENT:   Head: Normocephalic and atraumatic.   Eyes: EOM are normal. Pupils are equal, round, and reactive to light.   Neck: Normal range of motion. Neck supple.   Cardiovascular:   LVAD   Pulmonary/Chest: Breath sounds normal. No respiratory distress. He has no wheezes. He has no rhonchi. He has no rales. He exhibits no  tenderness.   Abdominal: Soft. Bowel sounds are normal. He exhibits no distension and no mass. There is no tenderness. There is no rebound and no guarding.   Musculoskeletal: Normal range of motion. He exhibits no edema or tenderness.   Neurological: He is alert and oriented to person, place, and time.   Skin: Skin is dry.   Psychiatric: He has a normal mood and affect.         ED Course   Procedures  Labs Reviewed   CBC W/ AUTO DIFFERENTIAL - Abnormal; Notable for the following components:       Result Value    RBC 4.42 (*)     Hemoglobin 11.0 (*)     Hematocrit 36.8 (*)     MCH 24.9 (*)     MCHC 29.9 (*)     RDW 19.5 (*)     Platelets 149 (*)     Gran% 73.3 (*)     Lymph% 15.8 (*)     All other components within normal limits    Narrative:     RED USED FOR GOLD TOP    B-TYPE NATRIURETIC PEPTIDE - Abnormal; Notable for the following components:     (*)     All other components within normal limits    Narrative:     RED USED FOR GOLD TOP    COMPREHENSIVE METABOLIC PANEL - Abnormal; Notable for the following components:    Sodium 135 (*)     CO2 21 (*)     Glucose 123 (*)     Creatinine 1.7 (*)      (*)     ALT 99 (*)     eGFR if  46.9 (*)     eGFR if non  40.5 (*)     All other components within normal limits    Narrative:     RED USED FOR [facility]    LACTIC ACID, PLASMA - Abnormal; Notable for the following components:    Lactate (Lactic Acid) 3.1 (*)     All other components within normal limits    Narrative:     RED USED FOR GOLD TOP    TROPONIN I - Abnormal; Notable for the following components:    Troponin I 0.162 (*)     All other components within normal limits    Narrative:     RED USED FOR [facility]    LACTATE DEHYDROGENASE - Abnormal; Notable for the following components:     (*)     All other components within normal limits    Narrative:     RED USED FOR GOLD TOP    LIPASE    Narrative:     RED USED FOR GOLD TOP    URINALYSIS, REFLEX TO URINE CULTURE    PROTIME-INR   PROTIME-INR     EKG Readings: (Independently Interpreted)   EKG: wide complex tachycardia at 200       Imaging Results    None          Medical Decision Making:   History:   Old Medical Records: I decided to obtain old medical records.  Clinical Tests:   Lab Tests: Ordered and Reviewed  Medical Tests: Ordered and Reviewed  Other:   I have discussed this case with another health care provider.       APC / Resident Notes:   68-year-old male with LVAD presenting for chest pain and nausea/vomiting.  Patient alert and oriented, nontoxic appearing on exam.  Initial heart rate 88 increased to 200s in V-tach. DDX includes gastroenteritis, LVAD malfunction, ACS.  Will check labs, and consult Cardiology.    Patient seen and evaluated by Cardiology, able to convert patient back to normal rhythm. Patient to be admitted to heart transplant service for further management.  I discussed this patient with my supervising physician.    Daphnie Wagner PA-C           Attending Attestation:     Physician Attestation Statement for NP/PA:   I have conducted a face to face encounter with this patient in addition to the NP/PA, due to Medical Complexity    Other NP/PA Attestation Additions:      Medical Decision Makin-year-old male with history of heart failure with LVAD presenting with chest pain.  Patient found to be in ventricular tachycardia at a rate of 200bpm.  Cardiology fellow at bedside, overdrive pacing performed with rhythm now in the 100bpm.  Patient reports significant improvement of symptoms.  Patient will be admitted to [facility] for further treatment evaluation     Attending Critical Care:   Critical Care Times:   ==============================================================  · Total Critical Care Time - exclusive of procedural time: 35 minutes.  ==============================================================  Critical care was necessary to treat or prevent imminent or life-threatening deterioration of  the following conditions: cardiac arrhythmia.   Critical care was time spent personally by me on the following activities: examination of patient, ordering lab, x-rays, and/or EKG, ordering and performing treatments and interventions, discussion with consultants, re-evaluation of patient's conition, development of treatment plan with patient or relative and review of x-rays / CT sent with the patient.     Physician Attestation for Scribe:      Comments:                  Clinical Impression:   The primary encounter diagnosis was Chest pain. Diagnoses of LVAD (left ventricular assist device) present, Left ventricular assist device (LVAD) complication, Non-intractable vomiting with nausea, unspecified vomiting type, VT (ventricular tachycardia), Chronic combined systolic and diastolic congestive heart failure, and Complication involving left ventricular assist device (LVAD), subsequent encounter were also pertinent to this visit.      Disposition:   Disposition: Admitted  Condition: Rickie Wagner PA-C  09/25/18 1375       Xochilt Workman MD  09/25/18 3221

## 2018-09-26 PROBLEM — I50.43 ACUTE ON CHRONIC COMBINED SYSTOLIC AND DIASTOLIC HEART FAILURE: Status: ACTIVE | Noted: 2018-01-01

## 2018-09-26 NOTE — PROGRESS NOTES
[facility]  Cardiac Electrophysiology  Progress Note    Admission Date: 9/25/2018  Code Status: Full Code   Attending Physician: Mohit Ambrosio MD   Expected Discharge Date:   Principal Problem:VT (ventricular tachycardia)    Subjective:     Interval History: No acute issues overnight. No further VT episodes on telemetry.    Patient examined at bedside this morning. Denies recurrent episodes of chest pain, palpitations, or diaphoresis. Clinically feels well.    Review of Systems   Constitution: Negative for chills, decreased appetite, diaphoresis, fever, weakness, malaise/fatigue, night sweats, weight gain and weight loss.   Cardiovascular: Negative for chest pain, irregular heartbeat, leg swelling, near-syncope, orthopnea, palpitations, paroxysmal nocturnal dyspnea and syncope.   Respiratory: Negative for cough, shortness of breath, sputum production and wheezing.    Neurological: Negative for dizziness, focal weakness, headaches, light-headedness, numbness, paresthesias, seizures and sensory change.   Psychiatric/Behavioral: Negative for altered mental status. The patient is not nervous/anxious.      Objective:     Vital Signs (Most Recent):  Temp: 97.3 °F (36.3 °C) (09/26/18 1100)  Pulse: 80 (09/26/18 1200)  Resp: (!) 24 (09/26/18 1200)  BP: 106/78 (09/26/18 1200)  SpO2: 100 % (09/26/18 1100) Vital Signs (24h Range):  Temp:  [97.3 °F (36.3 °C)-98.2 °F (36.8 °C)] 97.3 °F (36.3 °C)  Pulse:  [65-80] 80  Resp:  [8-27] 24  SpO2:  [97 %-100 %] 100 %  BP: ()/(0-90) 106/78     Weight: 72.8 kg (160 lb 7.9 oz)  Body mass index is 23.7 kg/m².     SpO2: 100 %  O2 Device (Oxygen Therapy): room air    Physical Exam   Constitutional: He is oriented to person, place, and time. He appears well-developed and well-nourished. No distress.   Neck: Normal range of motion. Neck supple. No JVD present.   Cardiovascular: LVAD hum.  Pulmonary/Chest: Effort normal and breath sounds normal. No respiratory distress.  He has no wheezes. He has no rales. He exhibits no tenderness.   Abdominal: Soft. Bowel sounds are normal. He exhibits no distension. There is no tenderness.   Neurological: He is alert and oriented to person, place, and time.   Skin: Skin is warm and dry. No erythema.   Psychiatric: He has a normal mood and affect. His behavior is normal. Judgment and thought content normal.       Significant Labs: All pertinent lab results from the last 24 hours have been reviewed.    Significant Imaging: Reviewed in [facility]    Assessment and Plan:     * VT (ventricular tachycardia)    69 yo M with NICM s/p HM3 LVAD implantation, s/p DC ICD ([employer]) who is admitted following sustained MM VT () s/p successful overdrive pacing. Mexiletine added to AAD regimen.     Recommendations:  -- No further VT events on telemetry.   -- Continue mexiletine 200 mg Q12H.   -- Check amiodarone level tomorrow AM before next dose (order placed).  -- Increase PTA dose of amiodarone to 600 mg daily starting tomorrow (order placed).            Rayna Monte MD  Cardiac Electrophysiology  [facility]    Discussed with Dr. Winchester.

## 2018-09-26 NOTE — PROGRESS NOTES
[facility]  Heart Transplant  Progress Note    Patient Name: Suman Hayden  MRN: [id number]  Admission Date: 9/25/2018  Hospital Length of Stay: 1 days  Attending Physician: Mohit Ambrosio MD  Primary Care Provider: Joe Ernst MD  Principal Problem:VT (ventricular tachycardia)    Subjective:     Interval History: Feeling much better this am. No more VT overnight.    Scheduled Meds:   amiodarone  400 mg Oral Daily    dronabinol  5 mg Oral BID    furosemide  60 mg Intravenous BID    magnesium oxide  400 mg Oral Daily    mexiletine  200 mg Oral Q12H    pantoprazole  40 mg Oral Daily    pravastatin  20 mg Oral QHS    spironolactone  25 mg Oral Daily     PRN Meds:HYDROcodone-acetaminophen, influenza, ondansetron, polyethylene glycol    Review of patient's allergies indicates:   Allergen Reactions    Asa [aspirin] Other (See Comments)     Bleeding ulcer     Objective:     Vital Signs (Most Recent):  Temp: 98.2 °F (36.8 °C) (09/26/18 0700)  Pulse: 80 (09/26/18 1000)  Resp: (!) 21 (09/26/18 1000)  BP: 106/72 (09/26/18 0800)  SpO2: 99 % (09/26/18 0700) Vital Signs (24h Range):  Temp:  [97.9 °F (36.6 °C)-98.2 °F (36.8 °C)] 98.2 °F (36.8 °C)  Pulse:  [65-80] 80  Resp:  [8-27] 21  SpO2:  [97 %-100 %] 99 %  BP: ()/(0-90) 106/72     Patient Vitals for the past 72 hrs (Last 3 readings):   Weight   09/26/18 0900 72.8 kg (160 lb 7.9 oz)   09/25/18 0946 69 kg (152 lb 1.9 oz)     Body mass index is 23.7 kg/m².      Intake/Output Summary (Last 24 hours) at 9/26/2018 1008  Last data filed at 9/26/2018 0900  Gross per 24 hour   Intake 720 ml   Output 550 ml   Net 170 ml       Hemodynamic Parameters:       Telemetry: nsr    Physical Exam   Constitutional: He is oriented to person, place, and time. He appears well-developed and well-nourished.   HENT:   Head: Normocephalic.   Eyes: Pupils are equal, round, and reactive to light.   Neck: Normal range of motion. Neck supple. JVD present.    Cardiovascular: Normal rate and regular rhythm.   Vad hum   Pulmonary/Chest: Effort normal and breath sounds normal.   Abdominal: Soft. Bowel sounds are normal.   Musculoskeletal: Normal range of motion.   Neurological: He is alert and oriented to person, place, and time.   Skin: Skin is warm and dry.   Psychiatric: He has a normal mood and affect. His behavior is normal.   Nursing note and vitals reviewed.    Significant Labs:  CBC:  Recent Labs   Lab  09/25/18   1015  09/26/18   0346   WBC  6.76  6.23   RBC  4.42*  3.95*   HGB  11.0*  9.7*   HCT  36.8*  33.0*   PLT  149*  131*   MCV  83  84   MCH  24.9*  24.6*   MCHC  29.9*  29.4*     BNP:  Recent Labs   Lab  09/25/18   1015  09/26/18   0346   BNP  141*  937*     CMP:  Recent Labs   Lab  09/25/18   1015  09/26/18   0346   GLU  123*  95   CALCIUM  9.7  9.4   ALBUMIN  3.6  3.2*   PROT  7.1  6.3   NA  135*  138   K  4.7  4.4   CO2  21*  22*   CL  103  106   BUN  17  18   CREATININE  1.7*  1.4   ALKPHOS  127  102   ALT  99*  67*   AST  173*  93*   BILITOT  0.5  0.4      Coagulation:   Recent Labs   Lab  09/25/18   1444  09/26/18   0346   INR  3.2*  3.2*  3.6*   APTT   --   34.1*     LDH:  Recent Labs   Lab  09/25/18   1015  09/26/18   0346   LDH  416*  202     Microbiology:  Microbiology Results (last 7 days)     ** No results found for the last 168 hours. **        I have reviewed all pertinent labs within the past 24 hours.    Estimated Creatinine Clearance: 50.5 mL/min (based on SCr of 1.4 mg/dL).    Diagnostic Results:  I have reviewed all pertinent imaging results/findings within the past 24 hours.    Assessment and Plan:     68 BM with NICM, S/P Heartmate 3 as DT therapy 7/27/17, ICD revision 11/20/17 with Dr. Vidal (DC ICD placed with active RA/LV leads (RV lead capped during revision) that was complicated by pocket hematoma, who presented to the ER this morning with the chief complaint of weakness. On arrival his pulse was noted to be 200 and appeared  consistent with VT on telemetry. He states that he woke up early this morning(around 4am) with palpitations. Reports associated diaphoresis, lightheadedness and bubbling sensation in the stomach with nausea and 3 episodes of emesis. States that he threw up his morning medications, vomit appeared yellowish without any blood or coffee-ground appearance. Cards Fellow came to bedside and interrogated ICD. He was paced out successfully. He is currently nsr with rate ~80. He states that he is feeling much better now. Lying in bed at about 15 degree angle in nad.         * VT (ventricular tachycardia)    -Overdrive paced at bedside in ER by Cards Fellow.   -Continue Amiodarone/mexiletine per Dr Zee.  -EP Cx.   -Keep K>4, Mag>2.  -Transfer to floor.         LVAD (left ventricular assist device) present    -S/P HM III DT 7/2017  -Speed 5200  -INR supratherapeutic. Holding coumadin.   -Antiplatelets: No ASA secondary to recurrent GIB.    -LDH at baseline.   -Will get echo.    Procedure: Device Interrogation Including analysis of device parameters  Current Settings: Ventricular Assist Device    TXP LVAD INTERROGATIONS 9/26/2018 9/26/2018 9/26/2018 9/26/2018 9/26/2018 9/26/2018 9/26/2018   Type HeartMate3 HeartMate3 HeartMate3 HeartMate3 HeartMate3 HeartMate3 HeartMate3   Flow 3.8 4.0 3.5 3.8 3.8 3.7 3.5   Speed 5200 5200 5200 5200 5200 5200 5200   PI 6.5 4.7 6.3 5.6 4.9 5.2 6.6   Power ([place]) 3.6 3.6 3.6 3.6 3.6 3.6 3.7   LSL - - - - - - -   Pulsatility - - - - - - -           Acute on chronic combined systolic and diastolic heart failure    -Volume up on exam today.  -Will start IVP Lasix.   -Continue Aldactone.            Kishore Paz NP  Heart Transplant  [facility]

## 2018-09-26 NOTE — PROGRESS NOTES
Admit Note     Met with patient and spouse to assess needs. Patient is a 68 y.o.  male, admitted for VT (ventricular tachycardia) [I47.2], Chronic combined systolic and diastolic congestive heart failure [I50.42], Chest pain [R07.9], Left ventricular assist device (LVAD) complication [T82.9XXA], LVAD (left ventricular assist device) present [Z95.811], Non-intractable vomiting with nausea, unspecified vomiting type [R11.2], and Acute GI bleeding [K92.2], per medical record. Pt received a LVAD 7-27-17.     Patient admitted from outside hospital on 9/25/2018.  At this time, patient presents as alert and oriented x 4, pleasant, calm and communicative.  At this time, patients caregiver presents as alert and oriented x 4 and communicative.    Household/Family Systems     Patient resides with his wife and son at:     [address] [address]     Support system includes spouse, daughter in law, son in law, and extended family.    Patient does not have dependents that are need of being cared for.     Patients primary caregiver is Kia Hayden, patients spouse, phone number [phone] (best contact number).    Home phone has been turned back on : [phone]  Additional emergency contacts:  Rayna (daughter) [phone]  Malcolm (son in law) [phone]  Pt's spouse reports her son in law is the  in [place].  Boni Hayden (son) [phone]  Artie Catracho (nephew) [phone]    During admission, patient's caregiver plans to stay in patient's room.  Confirmed patient and patients caregivers do have access to reliable transportation.    Cognitive Status/Learning     Patient reports reading ability as 8th grade and states patient does not have difficulty with seeing, hearing, comprehension, learning and memory.  Pt reports he does have difficulty with reading and writing. Patient reports patient learns best by visual.   Needed: No.   Highest education level: High School  (9-12) or GED    Vocation/Disability     Working for Income: No  If no, reason not working: Patient Choice - Retired  Patient retired in 2000 from the [employer].  Pt was a .    Pt's spouse worked at [employer] for many years and was working as a PCA until the pt became ill.    Pt's spouse reports pt receives $912.00 a month. Pt's spouse reports she will start receiving a SS check of $711 a month in October. Pt's spouse reports she did apply for Medicaid and was denied. Pt's spouse reports she was told she needed to provide more medical bills to qualify for Medicaid, and she plans to do this soon.     Adherence     Patient reports a high level of adherence to patients health care regimen.  Adherence counseling and education provided. Patient verbalizes understanding.    Substance Use    Patient reports the following substance usage.    Tobacco: none, patient denies any use.  Alcohol: none, patient denies any use.  Illicit Drugs/Non-prescribed Medications: none, patient denies any use.  Patient states clear understanding of the potential impact of substance use.  Substance abstinence/cessation counseling, education and resources provided and reviewed.     Services Utilizing/ADLS    Infusion Service: Prior to admission, patient utilizing? no pt has used [facility] in the past  Home Health: Prior to admission, patient utilizing? no Pt used [facility] in the past [phone], fax [phone].  DME: Prior to admission, yes walker, wheelchair and bedside commode. Pt reports he uses the walker on occasion, but does not use the W/C or BSC.  Pulmonary/Cardiac Rehab: Prior to admission, no  Dialysis:  Prior to admission, no  Transplant Specialty Pharmacy:  Prior to admission, no.    Prior to admission, patient reports patient was independent with ADLS and was not driving. Pt's spouse and other family members drive.  Patient reports patient is not able to care for self at this time due to compromised  medical condition (as documented in medical record) and physical weakness.  Patient indicates a willingness to care for self once medically cleared to do so.    Insurance/Medications    Insured by   Payor/Plan Subscr  Sex Relation Sub. Ins. ID Effective Group Num   1. MEDICARE - ME* MIRTA LOPEZ 1950 Male  [id number] 6/1/15                                    [address]   2. BLUE CROSS BL* MIRTA LOPEZ 1950 Male  [id number] 1/1/10 [id number]                                   [address] [address]      Primary Insurance (for UNOS reporting): Public Insurance - Medicare FFS (Fee For Service)  Secondary Insurance (for UNOS reporting): Private Insurance     Pt and wife report ability to afford $1500 oop now that wife will be receiving SS check.    Patient reports patient is able to obtain and afford medications at this time and at time of discharge.    Living Will/Healthcare Power of     Patient states patient does not have a LW and/or HCPA.   provided education regarding LW and HCPA and the completion of forms.    Coping/Mental Health    Patient is coping adequately with the aid of  family members and kayli.   Patient denies mental health difficulties. Pt's sister recently . Pt reports he did initially feel some sadness about his sister's death, because they were very close, however pt reports he does feel comfort knowing his sister is now in a better place. SW provided support.     Discharge Planning    At time of discharge, patient plans to return to patient's home under the care of spouse.  Patients family will transport patient.  Per rounds today, expected discharge date has not been medically determined at this time. Patient and patients caregiver  verbalize understanding and are involved in treatment planning and discharge process.    Additional Concerns    Patient's caretaker denies additional needs and/or concerns at this time. Patient is being followed for needs,  education, resources, information, emotional support, supportive counseling, and for supportive and skilled discharge plan of care.  providing ongoing psychosocial support, education, resources and d/c planning as needed.  SW remains available. Patient's caregiver verbalizes understanding and agreement with information reviewed,  availability and how to access available resources as needed. Patient denies additional needs and/or concerns at this time. Patient verbalizes understanding and agreement with information reviewed, social work availability, and how to access available resources as needed.

## 2018-09-26 NOTE — ASSESSMENT & PLAN NOTE
-S/P HM III DT 7/2017  -Speed 5200  -INR supratherapeutic. Holding coumadin.   -Antiplatelets: No ASA secondary to recurrent GIB.    -LDH at baseline.   -Will get echo.    Procedure: Device Interrogation Including analysis of device parameters  Current Settings: Ventricular Assist Device    TXP LVAD INTERROGATIONS 9/26/2018 9/26/2018 9/26/2018 9/26/2018 9/26/2018 9/26/2018 9/26/2018   Type HeartMate3 HeartMate3 HeartMate3 HeartMate3 HeartMate3 HeartMate3 HeartMate3   Flow 3.8 4.0 3.5 3.8 3.8 3.7 3.5   Speed 5200 5200 5200 5200 5200 5200 5200   PI 6.5 4.7 6.3 5.6 4.9 5.2 6.6   Power ([place]) 3.6 3.6 3.6 3.6 3.6 3.6 3.7   LSL - - - - - - -   Pulsatility - - - - - - -

## 2018-09-26 NOTE — ED NOTES
LVAD serial numbers and settings  Battery 1: [id number]  Battery 2: [id number]  Battery 3:  [id number]  Battery 4: [id number]  Backup Controller : [id number]  Controller: [id number]    Settings: Speed: 5200  Flow; 4.3  PI: 33  Power: 36

## 2018-09-26 NOTE — PROGRESS NOTES
FOLLOW-UP to TRANSPLANT RECIPIENT ADULT PSYCHOSOCIAL ASSESSMENT conducted on 5/9/18:    ARINA met with pt and wife, Kia, in hospital today to f/u on finances prior to transplant listing. Pt and caregiver both present as aao x4, calm, cooperative, and asking and answering questions appropriately.  Pt and wife reported during assessment in May that pt's disability income had decreased and wife's SSI had not yet started, which placed them under financial strain.  Pt and wife report today that wife is now receiving her SSI income and their monthly household income is back to about what it was previously.  Pt receives $912/mth in long-term. Wife reports she was receiving $812/mth in SSI, and starting in October it will be $711 a month instead.     Wife reports she applied for Medicaid, and pt was denied due to not having enough medical bills. Pt's wife plans to reapply after receiving bills from this hospital stay.     Per financial emma Pacheco, pt will have $1,500 annual OOP on BCBS secondary plan.  Pt and wife report more financial security today than during assessment in May and during update with CARLIN Wells in August. Pt and wife report they were apple to apply for a program to lower their mortgage payment, which has freed up some finances. Pt and wife report finances are tight, however the $1,500 OOP should be affordable at this time.     ARINA reviewed caregiver support needed post-transplant with pt and wife in hospital today, including need for 24/7 caregiver during local post-transplant stay. Wife reports caregiver plan remains the same as last discussed with CARLIN Wells LCSW in August. Primary caregiver for transplant is wife Kia and backup caregivers are dgtr Rayna Freddie, son-in-law Max Frazier, and son Boni Hayden.  Pt reports pt's sister is also willing to assist if needed. See ARINA note from 8/15/18 for contact information for all caregivers.      SUITABILITY: Patient presents as a suitable  candidate for transplant at this time. Based on psychosocial risk factors, patient presents as medium risk due to limited finances and concerns about home environment.  Dgtr has reported that pt's home can be very cluttered at times, but is not unclean or dirty.    Since LVAD implant, pt and wife have demonstrated adequate coping.  Pt and wife have experienced increased distress at times and have experienced some relationship issues due to this distress, but report coping better at recent visits.  Pt and wife both report coping well today and appear to be in good spirits.  Dgtr Rayna reports pt has strong backup caregiver plan in dgtr and son-in-law.    Recommendations: SW recommending pt and wife follow up with SW in 6 months to reassess fiances, coping, and caregiver plan.       Rosa Whitehead LCSW

## 2018-09-26 NOTE — ED NOTES
The patient is awake, alert and cooperative with a calm affect, patient is aware of environment. Airway is open and patent, respirations are spontaneous, normal respiratory effort and rate noted, skin warm and dry, moves all extremities well, appearance: no apparent distress noted. Side rails x 2. Call bell within reach. Will continue to monitor. Pt and family updated on the current admit status.

## 2018-09-26 NOTE — PLAN OF CARE
Problem: Patient Care Overview  Goal: Plan of Care Review  Outcome: Ongoing (interventions implemented as appropriate)  Pt admitted overnight. Vital signs stable. NSR throughout shift, no ectopy noted.  No alarms with LVAD overnight. Wife at beside. Plan of care is for follow up evaluation by team on day shift. Plan of care reviewed and discussed with patient and spouse. Questions and concerned addressed.

## 2018-09-26 NOTE — ASSESSMENT & PLAN NOTE
-Overdrive paced at bedside in ER by Cards Fellow.   -Continue Amiodarone/mexiletine per Dr Zee.  -EP Cx.   -Keep K>4, Mag>2.  -Transfer to floor.

## 2018-09-26 NOTE — SUBJECTIVE & OBJECTIVE
Interval History: No acute issues overnight. No further VT episodes on telemetry.    Patient examined at bedside this morning. Denies recurrent episodes of chest pain, palpitations, or diaphoresis. Clinically feels well.    Review of Systems   Constitution: Negative for chills, decreased appetite, diaphoresis, fever, weakness, malaise/fatigue, night sweats, weight gain and weight loss.   Eyes: Negative.    Cardiovascular: Negative for chest pain, irregular heartbeat, leg swelling, near-syncope, orthopnea, palpitations, paroxysmal nocturnal dyspnea and syncope.   Respiratory: Negative for cough, shortness of breath, sputum production and wheezing.    Endocrine: Negative.    Hematologic/Lymphatic: Negative for bleeding problem.   Skin: Negative for color change, flushing, rash and suspicious lesions.   Musculoskeletal: Negative.    Gastrointestinal: Negative for bloating, abdominal pain, change in bowel habit, constipation, diarrhea, heartburn, melena, nausea and vomiting.   Genitourinary: Negative for flank pain, frequency, hematuria, incomplete emptying and urgency.   Neurological: Negative for dizziness, focal weakness, headaches, light-headedness, numbness, paresthesias, seizures and sensory change.   Psychiatric/Behavioral: Negative for altered mental status. The patient is not nervous/anxious.      Objective:     Vital Signs (Most Recent):  Temp: 97.3 °F (36.3 °C) (09/26/18 1100)  Pulse: 80 (09/26/18 1200)  Resp: (!) 24 (09/26/18 1200)  BP: 106/78 (09/26/18 1200)  SpO2: 100 % (09/26/18 1100) Vital Signs (24h Range):  Temp:  [97.3 °F (36.3 °C)-98.2 °F (36.8 °C)] 97.3 °F (36.3 °C)  Pulse:  [65-80] 80  Resp:  [8-27] 24  SpO2:  [97 %-100 %] 100 %  BP: ()/(0-90) 106/78     Weight: 72.8 kg (160 lb 7.9 oz)  Body mass index is 23.7 kg/m².     SpO2: 100 %  O2 Device (Oxygen Therapy): room air    Physical Exam   Constitutional: He is oriented to person, place, and time. He appears well-developed and well-nourished.  No distress.   HENT:   Head: Normocephalic and atraumatic.   Mouth/Throat: Oropharynx is clear and moist.   Eyes: EOM are normal. Pupils are equal, round, and reactive to light.   Neck: Normal range of motion. Neck supple. No JVD present.   Cardiovascular: Normal rate and regular rhythm. Exam reveals no gallop and no friction rub.   No murmur heard.  Pulmonary/Chest: Effort normal and breath sounds normal. No respiratory distress. He has no wheezes. He has no rales. He exhibits no tenderness.   Abdominal: Soft. Bowel sounds are normal. He exhibits no distension. There is no tenderness.   Musculoskeletal: Normal range of motion. He exhibits no edema.   Lymphadenopathy:     He has no cervical adenopathy.   Neurological: He is alert and oriented to person, place, and time.   Skin: Skin is warm and dry. No erythema.   Psychiatric: He has a normal mood and affect. His behavior is normal. Judgment and thought content normal.       Significant Labs: All pertinent lab results from the last 24 hours have been reviewed.    Significant Imaging: Reviewed in EPIC

## 2018-09-26 NOTE — PLAN OF CARE
Problem: Patient Care Overview  Goal: Plan of Care Review  Outcome: Ongoing (interventions implemented as appropriate)    No acute events throughout day. See vital signs and assessments in flowsheets. See below for updates on today's progress.     Pulmonary: Respirations clear, pt on room air    Cardiovascular: Heartmate III in place, Speed 5200, Flow 3.5-4.2, PI 4.7-6.3, Power 3.6-3.8. NSR, HR 70-80s, BP 110s-120s/70-80s. Doppler pressures 92-98    Neurological: AAOx3    Gastrointestinal: regular diet    Genitourinary: WNL    Endocrine: WNL    Integumentary/Other: no skin breakdown    Infusions: none    Patient progressing towards goals as tolerated, plan of care communicated and reviewed with Suman Hayden and family. All concerns addressed. Will continue to monitor. Orders to step down unit, awaiting bed assignment.

## 2018-09-26 NOTE — SUBJECTIVE & OBJECTIVE
Past Medical History:   Diagnosis Date    Arthritis     Cardiomyopathy     CHF (congestive heart failure)     Coronary artery disease     Encounter for blood transfusion     Gastric polyp     Hyperlipidemia     Hypertension     Hypotension, iatrogenic     LVAD (left ventricular assist device) present     Obesity     S/P implantation of automatic cardioverter/defibrillator (AICD)     Ventricular tachycardia        Past Surgical History:   Procedure Laterality Date    ABDOMINAL SURGERY      APPENDECTOMY      CARDIAC CATHETERIZATION      CARDIAC DEFIBRILLATOR PLACEMENT      CARDIAC DEFIBRILLATOR PLACEMENT      CLOSURE-STERNAL WOUND N/A 7/28/2017    Performed by [facility] Downing [facility] at [facility] OR 2ND FLR    COLONOSCOPY      COLONOSCOPY N/A 3/9/2018    Procedure: COLONOSCOPY;  Surgeon: Andres Chatterjee MD;  Location: [facility] (2ND FLR);  Service: Endoscopy;  Laterality: N/A;    COLONOSCOPY N/A 8/8/2018    Procedure: COLONOSCOPY;  Surgeon: Andres Chatterjee MD;  Location: [facility] (2ND FLR);  Service: Endoscopy;  Laterality: N/A;    COLONOSCOPY N/A 8/8/2018    Performed by Andres Chatterjee MD at [facility] ENDO (2ND FLR)    COLONOSCOPY N/A 3/9/2018    Performed by Andres Chatterjee MD at [facility] (2ND FLR)    DEVICE ASSISTED ENTEROSCOPY-ANTEROGRADE N/A 1/25/2018    Performed by Andres Chatterjee MD at [facility] (2ND FLR)    DEVICE ASSISTED ENTEROSCOPY-ANTEROGRADE N/A 12/14/2017    Performed by Andres Chatterjee MD at [facility] (2ND FLR)    EGD (ESOPHAGOGASTRODUODENOSCOPY) N/A 8/8/2018    Performed by Andres Chatterjee MD at [facility] (2ND FLR)    ESOPHAGOGASTRODUODENOSCOPY      ESOPHAGOGASTRODUODENOSCOPY N/A 8/8/2018    Procedure: EGD (ESOPHAGOGASTRODUODENOSCOPY);  Surgeon: Andres Chatterjee MD;  Location: [facility] (2ND FLR);  Service: Endoscopy;  Laterality: N/A;    ESOPHAGOGASTRODUODENOSCOPY (EGD) N/A 3/6/2018    Performed by Andres Chatterjee MD at [facility] (2ND FLR)    ESOPHAGOGASTRODUODENOSCOPY (EGD)  N/A 12/8/2017    Performed by Gagan Barger MD at [facility] ENDO (2ND FLR)    ESOPHAGOGASTRODUODENOSCOPY (EGD) N/A 8/17/2017    Performed by Kishore Mills MD at [facility] ENDO (2ND FLR)    EXPLORATORY-LAPAROTOMY with small bowel resection  N/A 3/13/2018    Performed by Kenyon Tellez MD at [facility] OR 2ND FLR    HEART CATH-RIGHT Right 7/3/2017    Performed by Angie Torres MD at [facility] CATH LAB    INSERTION-LEFT VENTRICULAR ASSIST DEVICE N/A 7/27/2017    Performed by Sunny Downing MD at [facility] OR 2ND FLR    INSERTION-LEFT VENTRICULAR ASSIST DEVICE N/A 7/25/2017    Performed by Sunny Downing MD at [facility] OR 2ND FLR    LEFT VENTRICULAR ASSIST DEVICE  07/27/2017    PLACEMENT-NEOPERICARDIUM N/A 7/28/2017    Performed by Sunny Downing MD at [facility] OR 2ND FLR    RESECTION-BOWEL  3/13/2018    Performed by Kenyon Tellez MD at [facility] OR 2ND FLR    Retrograde deivce assisted enteroscopy N/A 1/26/2018    Performed by Jesse Davis MD at [facility] ENDO (2ND FLR)    REVISION-LEAD-ICD N/A 11/20/2017    Performed by Rubén Winchester MD at [facility] CATH LAB    TONSILLECTOMY      TRANSESOPHAGEAL ECHOCARDIOGRAM (GLENN) N/A 1/9/2018    Performed by [facility] Diagnostic Provider at [facility] CATH LAB       Review of patient's allergies indicates:   Allergen Reactions    Asa [aspirin] Other (See Comments)     Bleeding ulcer       No current facility-administered medications on file prior to encounter.      Current Outpatient Medications on File Prior to Encounter   Medication Sig    amiodarone (PACERONE) 400 MG tablet Take 1 tablet (400 mg total) by mouth once daily.    dronabinol (MARINOL) 5 MG capsule TAKE ONE CAPSULE BY MOUTH 3 TIMES DAILY BEFORE MEALS    furosemide (LASIX) 20 MG tablet Take 1 tablet (20 mg total) by mouth 2 (two) times daily.    HYDROcodone-acetaminophen (NORCO) 5-325 mg per tablet Take 1 tablet by mouth every 6 (six) hours as needed for Pain.    magnesium oxide (MAG-OX) 400 mg tablet Take 1 tablet (400 mg  total) by mouth once daily.    pantoprazole (PROTONIX) 40 MG tablet Take 1 tablet (40 mg total) by mouth once daily.    polyethylene glycol (GLYCOLAX) 17 gram PwPk Take 17 g by mouth daily as needed (constipation).    potassium chloride SA (K-DUR,KLOR-CON) 20 MEQ tablet Take 1 tablet (20 mEq total) by mouth once daily.    pravastatin (PRAVACHOL) 20 MG tablet Take 1 tablet (20 mg total) by mouth every evening.    spironolactone (ALDACTONE) 25 MG tablet Take 1 tablet (25 mg total) by mouth once daily.    warfarin (COUMADIN) 5 MG tablet Take 1/2 tab (2.5 mg) orally daily     Family History     Problem Relation (Age of Onset)    Heart disease Mother, Father        Tobacco Use    Smoking status: Never Smoker    Smokeless tobacco: Never Used   Substance and Sexual Activity    Alcohol use: No    Drug use: No    Sexual activity: Not Currently     Review of Systems   Constitution: Negative for chills, decreased appetite, diaphoresis, fever, weakness, malaise/fatigue, night sweats, weight gain and weight loss.   Eyes: Negative.    Cardiovascular: Negative for chest pain, irregular heartbeat, leg swelling, near-syncope, orthopnea, palpitations, paroxysmal nocturnal dyspnea and syncope.   Respiratory: Negative for cough, shortness of breath, sputum production and wheezing.    Endocrine: Negative.    Hematologic/Lymphatic: Negative for bleeding problem.   Skin: Negative for color change, flushing, rash and suspicious lesions.   Musculoskeletal: Negative.    Gastrointestinal: Negative for bloating, abdominal pain, change in bowel habit, constipation, diarrhea, heartburn, melena, nausea and vomiting.   Genitourinary: Negative for flank pain, frequency, hematuria, incomplete emptying and urgency.   Neurological: Negative for dizziness, focal weakness, headaches, light-headedness, numbness, paresthesias, seizures and sensory change.   Psychiatric/Behavioral: Negative for altered mental status. The patient is not  nervous/anxious.      Objective:     Vital Signs (Most Recent):  Temp: 97.3 °F (36.3 °C) (09/26/18 1100)  Pulse: 80 (09/26/18 1200)  Resp: (!) 24 (09/26/18 1200)  BP: 106/78 (09/26/18 1200)  SpO2: 100 % (09/26/18 1100) Vital Signs (24h Range):  Temp:  [97.3 °F (36.3 °C)-98.2 °F (36.8 °C)] 97.3 °F (36.3 °C)  Pulse:  [65-80] 80  Resp:  [8-27] 24  SpO2:  [97 %-100 %] 100 %  BP: ()/(0-90) 106/78       Weight: 72.8 kg (160 lb 7.9 oz)  Body mass index is 23.7 kg/m².    SpO2: 100 %  O2 Device (Oxygen Therapy): room air    Physical Exam   Constitutional: He is oriented to person, place, and time. He appears well-developed and well-nourished. No distress.   HENT:   Head: Normocephalic and atraumatic.   Mouth/Throat: Oropharynx is clear and moist.   Eyes: EOM are normal. Pupils are equal, round, and reactive to light.   Neck: Normal range of motion. Neck supple. No JVD present.   Cardiovascular: Normal rate and regular rhythm. Exam reveals no gallop and no friction rub.   No murmur heard.  Pulmonary/Chest: Effort normal and breath sounds normal. No respiratory distress. He has no wheezes. He has no rales. He exhibits no tenderness.   Abdominal: Soft. Bowel sounds are normal. He exhibits no distension. There is no tenderness.   Musculoskeletal: Normal range of motion. He exhibits no edema.   Lymphadenopathy:     He has no cervical adenopathy.   Neurological: He is alert and oriented to person, place, and time.   Skin: Skin is warm and dry. No erythema.   Psychiatric: He has a normal mood and affect. His behavior is normal. Judgment and thought content normal.       Significant Labs: All pertinent lab results from the last 24 hours have been reviewed.    Significant Imaging: Reviewed in EPIC

## 2018-09-26 NOTE — HPI
68 BM with chronic systolic heart failure secondary to non ischemic cardiomyopathy underwent Heartmate  3 LVAD implanted as DT therapy 7/27/17. He underwent ICD revision on 11/20/17 with Dr. Vidal (DC ICD placed with active RA/LV leads (RV lead capped during revision) that was complicated by pocket hematoma. He presented to the ER this morning with the chief complaint of weakness. On arrival his pulse was noted to be 200 and appeared consistent with VT on telemetry.     Medtronic interrogator was used to pace him out of VT successfully. Burst 10 @ 91% R-R.      Parameters:  VF, Monitor: ON >240bpm, Therapies: ATP during charging, 35J x 6  FVT, Monitor: OFF  VT, Monitor: -240bpm, Therapies: Burst(3), Burst(3)

## 2018-09-26 NOTE — SUBJECTIVE & OBJECTIVE
Interval History: Feeling much better this am. No more VT overnight.    Scheduled Meds:   amiodarone  400 mg Oral Daily    dronabinol  5 mg Oral BID    furosemide  60 mg Intravenous BID    magnesium oxide  400 mg Oral Daily    mexiletine  200 mg Oral Q12H    pantoprazole  40 mg Oral Daily    pravastatin  20 mg Oral QHS    spironolactone  25 mg Oral Daily     PRN Meds:HYDROcodone-acetaminophen, influenza, ondansetron, polyethylene glycol    Review of patient's allergies indicates:   Allergen Reactions    Asa [aspirin] Other (See Comments)     Bleeding ulcer     Objective:     Vital Signs (Most Recent):  Temp: 98.2 °F (36.8 °C) (09/26/18 0700)  Pulse: 80 (09/26/18 1000)  Resp: (!) 21 (09/26/18 1000)  BP: 106/72 (09/26/18 0800)  SpO2: 99 % (09/26/18 0700) Vital Signs (24h Range):  Temp:  [97.9 °F (36.6 °C)-98.2 °F (36.8 °C)] 98.2 °F (36.8 °C)  Pulse:  [65-80] 80  Resp:  [8-27] 21  SpO2:  [97 %-100 %] 99 %  BP: ()/(0-90) 106/72     Patient Vitals for the past 72 hrs (Last 3 readings):   Weight   09/26/18 0900 72.8 kg (160 lb 7.9 oz)   09/25/18 0946 69 kg (152 lb 1.9 oz)     Body mass index is 23.7 kg/m².      Intake/Output Summary (Last 24 hours) at 9/26/2018 1008  Last data filed at 9/26/2018 0900  Gross per 24 hour   Intake 720 ml   Output 550 ml   Net 170 ml       Hemodynamic Parameters:       Telemetry: nsr    Physical Exam   Constitutional: He is oriented to person, place, and time. He appears well-developed and well-nourished.   HENT:   Head: Normocephalic.   Eyes: Pupils are equal, round, and reactive to light.   Neck: Normal range of motion. Neck supple. JVD present.   Cardiovascular: Normal rate and regular rhythm.   Vad hum   Pulmonary/Chest: Effort normal and breath sounds normal.   Abdominal: Soft. Bowel sounds are normal.   Musculoskeletal: Normal range of motion.   Neurological: He is alert and oriented to person, place, and time.   Skin: Skin is warm and dry.   Psychiatric: He has a normal  mood and affect. His behavior is normal.   Nursing note and vitals reviewed.    Significant Labs:  CBC:  Recent Labs   Lab  09/25/18   1015  09/26/18   0346   WBC  6.76  6.23   RBC  4.42*  3.95*   HGB  11.0*  9.7*   HCT  36.8*  33.0*   PLT  149*  131*   MCV  83  84   MCH  24.9*  24.6*   MCHC  29.9*  29.4*     BNP:  Recent Labs   Lab  09/25/18   1015  09/26/18   0346   BNP  141*  937*     CMP:  Recent Labs   Lab  09/25/18   1015  09/26/18   0346   GLU  123*  95   CALCIUM  9.7  9.4   ALBUMIN  3.6  3.2*   PROT  7.1  6.3   NA  135*  138   K  4.7  4.4   CO2  21*  22*   CL  103  106   BUN  17  18   CREATININE  1.7*  1.4   ALKPHOS  127  102   ALT  99*  67*   AST  173*  93*   BILITOT  0.5  0.4      Coagulation:   Recent Labs   Lab  09/25/18   1444  09/26/18   0346   INR  3.2*  3.2*  3.6*   APTT   --   34.1*     LDH:  Recent Labs   Lab  09/25/18   1015  09/26/18   0346   LDH  416*  202     Microbiology:  Microbiology Results (last 7 days)     ** No results found for the last 168 hours. **        I have reviewed all pertinent labs within the past 24 hours.    Estimated Creatinine Clearance: 50.5 mL/min (based on SCr of 1.4 mg/dL).    Diagnostic Results:  I have reviewed all pertinent imaging results/findings within the past 24 hours.

## 2018-09-27 NOTE — NURSING
"Patient complaining of "light" headache 1/10.  Patient refuses medication management at this time.  "

## 2018-09-27 NOTE — ASSESSMENT & PLAN NOTE
67 yo M with NICM s/p HM3 LVAD implantation, s/p ICD (Medtronic) who is admitted following sustained MM VT () s/p successful overdrive pacing. Mexiletine added to AAD regimen.      Recommendations:  -- No further VT events on telemetry.   -- Continue mexiletine 200 mg Q12H and amio 600mg daily  -- Follow up amiodarone level, as results appear to be delayed

## 2018-09-27 NOTE — NURSING
Patient's spouse requests Dronabinol 5 mg oral be changed to TID per home dose.  EUSEBIA Mayorga NP, notified.

## 2018-09-27 NOTE — NURSING TRANSFER
Nursing Transfer Note      9/26/2018     Transfer From: [id number]    Transfer via stretcher    Transfer with cardiac monitoring    Transported by LAUREN GASTELUM     Medicines sent: NO    Chart send with patient: Yes    Notified: spouse    Patient reassessed at: 9/26/18 2320     Upon arrival to floor: cardiac monitor applied, patient oriented to room, call bell in reach and bed in lowest position    LVAD equipment inventoried.

## 2018-09-27 NOTE — PROGRESS NOTES
[facility]  Heart Transplant  Progress Note    Patient Name: Suman Hayden  MRN: [id number]  Admission Date: 9/25/2018  Hospital Length of Stay: 2 days  Attending Physician: Mohit Ambrosio MD  Primary Care Provider: Joe Ernst MD  Principal Problem:VT (ventricular tachycardia)    Subjective:     Interval History: Feeling much better this am. No more VT overnight.    Scheduled Meds:   amiodarone  600 mg Oral Daily    dronabinol  5 mg Oral BID    furosemide  20 mg Oral BID    magnesium oxide  400 mg Oral Daily    mexiletine  200 mg Oral Q12H    pantoprazole  40 mg Oral Daily    pravastatin  20 mg Oral QHS    spironolactone  25 mg Oral Daily     PRN Meds:HYDROcodone-acetaminophen, influenza, ondansetron, polyethylene glycol    Review of patient's allergies indicates:   Allergen Reactions    Asa [aspirin] Other (See Comments)     Bleeding ulcer     Objective:     Vital Signs (Most Recent):  Temp: 98.3 °F (36.8 °C) (09/27/18 0743)  Pulse: 75 (09/27/18 0743)  Resp: 18 (09/27/18 0743)  BP: (!) 82/0 (09/27/18 0743)  SpO2: 98 % (09/27/18 0743) Vital Signs (24h Range):  Temp:  [97.9 °F (36.6 °C)-98.4 °F (36.9 °C)] 98.3 °F (36.8 °C)  Pulse:  [64-91] 75  Resp:  [15-33] 18  SpO2:  [97 %-100 %] 98 %  BP: ()/(0-101) 82/0     Patient Vitals for the past 72 hrs (Last 3 readings):   Weight   09/27/18 0700 68.9 kg (151 lb 14.4 oz)   09/26/18 0900 72.8 kg (160 lb 7.9 oz)   09/25/18 0946 69 kg (152 lb 1.9 oz)     Body mass index is 22.43 kg/m².      Intake/Output Summary (Last 24 hours) at 9/27/2018 1126  Last data filed at 9/27/2018 0900  Gross per 24 hour   Intake 673 ml   Output 3650 ml   Net -2977 ml        Telemetry: nsr    Physical Exam   Constitutional: He is oriented to person, place, and time. He appears well-developed and well-nourished.   HENT:   Head: Normocephalic.   Eyes: Pupils are equal, round, and reactive to light.   Neck: Normal range of motion. Neck supple.   Cardiovascular:  Normal rate and regular rhythm.   Vad hum   Pulmonary/Chest: Effort normal and breath sounds normal.   Abdominal: Soft. Bowel sounds are normal.   Musculoskeletal: Normal range of motion.   Neurological: He is alert and oriented to person, place, and time.   Skin: Skin is warm and dry.   Psychiatric: He has a normal mood and affect. His behavior is normal.   Nursing note and vitals reviewed.    Significant Labs:  CBC:  Recent Labs   Lab  09/25/18   1015  09/26/18 0346 09/27/18   0512   WBC  6.76  6.23  6.86   RBC  4.42*  3.95*  4.77   HGB  11.0*  9.7*  11.8*   HCT  36.8*  33.0*  38.6*   PLT  149*  131*  169   MCV  83  84  81*   MCH  24.9*  24.6*  24.7*   MCHC  29.9*  29.4*  30.6*     BNP:  Recent Labs   Lab  09/25/18   1015  09/26/18 0346   BNP  141*  937*     CMP:  Recent Labs   Lab  09/25/18   1015  09/26/18 0346  09/27/18   0512   GLU  123*  95  92   CALCIUM  9.7  9.4  10.1   ALBUMIN  3.6  3.2*   --    PROT  7.1  6.3   --    NA  135*  138  136   K  4.7  4.4  4.1   CO2  21*  22*  27   CL  103  106  100   BUN  17  18  20   CREATININE  1.7*  1.4  1.3   ALKPHOS  127  102   --    ALT  99*  67*   --    AST  173*  93*   --    BILITOT  0.5  0.4   --       Coagulation:   Recent Labs   Lab  09/25/18   1444  09/26/18 0346 09/27/18   0512   INR  3.2*  3.2*  3.6*  2.4*   APTT   --   34.1*  30.6     LDH:  Recent Labs   Lab  09/25/18   1015  09/26/18   0346  09/27/18   0512   LDH  416*  202  229     Microbiology:  Microbiology Results (last 7 days)     ** No results found for the last 168 hours. **        I have reviewed all pertinent labs within the past 24 hours.    Estimated Creatinine Clearance: 53 mL/min (based on SCr of 1.3 mg/dL).    Diagnostic Results:  I have reviewed all pertinent imaging results/findings within the past 24 hours.    Assessment and Plan:     68 BM with NICM, S/P Heartmate 3 as DT therapy 7/27/17, ICD revision 11/20/17 with Dr. Vidal (DC ICD placed with active RA/LV leads (RV lead capped  during revision) that was complicated by pocket hematoma, who presented to the ER this morning with the chief complaint of weakness. On arrival his pulse was noted to be 200 and appeared consistent with VT on telemetry. He states that he woke up early this morning(around 4am) with palpitations. Reports associated diaphoresis, lightheadedness and bubbling sensation in the stomach with nausea and 3 episodes of emesis. States that he threw up his morning medications, vomit appeared yellowish without any blood or coffee-ground appearance. Cards Fellow came to bedside and interrogated ICD. He was paced out successfully. He is currently nsr with rate ~80. He states that he is feeling much better now. Lying in bed at about 15 degree angle in nad.           * VT (ventricular tachycardia)    -Overdrive paced at bedside in ER by Norma Fellow.   -Continue Amiodarone/mexiletine per Dr Zee.  -EP Cx.   -Keep K>4, Mag>2.  -Transfer to floor.         LVAD (left ventricular assist device) present    -S/P HM III DT 7/2017  -Speed 5200  -INR supratherapeutic. Holding coumadin.   -Antiplatelets: No ASA secondary to recurrent GIB.    -LDH at baseline.   -Echo pending.    Procedure: Device Interrogation Including analysis of device parameters  Current Settings: Ventricular Assist Device    TXP LVAD INTERROGATIONS 9/27/2018 9/27/2018 9/26/2018 9/26/2018 9/26/2018 9/26/2018 9/26/2018   Type HeartMate3 HeartMate3 HeartMate3 HeartMate3 HeartMate3 HeartMate3 HeartMate3   Flow 3.8 4.1 3.6 3.4 4.1 3.5 3.8   Speed 5200 5200 5200 5200 5200 5200 5200   PI 4.1 5.1 7.5 6.3 5.3 6.3 5.3   Power ([place]) 3.6 3.7 3.6 3.4 3.8 3.6 3.6   LSL 4800 - 4800 - - - -   Pulsatility Intermittent pulse - Intermittent pulse - - - Intermittent pulse           Acute on chronic combined systolic and diastolic heart failure    -Volume improved.  -Transition to PO Lasix.   -Continue Aldactone.            Kishore Paz NP  Heart Transplant  [facility]

## 2018-09-27 NOTE — SUBJECTIVE & OBJECTIVE
Interval History: Feeling well, no events overnight, was mildly hypertensive    Tele: Regular rhythm with significant baseline artifact in all leads. 80 bpm with V-pacing spikes evident    ROS  Objective:     Vital Signs (Most Recent):  Temp: 98.5 °F (36.9 °C) (09/27/18 1516)  Pulse: 80 (09/27/18 1516)  Resp: 18 (09/27/18 1516)  BP: (!) 80/0 (09/27/18 1516)  SpO2: 99 % (09/27/18 1516) Vital Signs (24h Range):  Temp:  [97.9 °F (36.6 °C)-98.5 °F (36.9 °C)] 98.5 °F (36.9 °C)  Pulse:  [64-91] 80  Resp:  [17-30] 18  SpO2:  [97 %-99 %] 99 %  BP: ()/(0-101) 80/0     Weight: 68.9 kg (151 lb 14.4 oz)  Body mass index is 22.43 kg/m².     SpO2: 99 %  O2 Device (Oxygen Therapy): room air    Physical Exam   Constitutional: He is oriented to person, place, and time. He appears well-developed and well-nourished. No distress.   HENT:   Head: Normocephalic and atraumatic.   Mouth/Throat: Oropharynx is clear and moist.   Eyes: EOM are normal. Pupils are equal, round, and reactive to light.   Neck: Normal range of motion. Neck supple. No JVD present.   Cardiovascular: Regular rhythm. Exam reveals no gallop and no friction rub.   No murmur heard.  LVAD hum obscuring heart sounds   Pulmonary/Chest: Effort normal and breath sounds normal. No respiratory distress. He has no wheezes. He has no rales. He exhibits no tenderness.   Abdominal: Soft. Bowel sounds are normal. He exhibits no distension. There is no tenderness.   Musculoskeletal: Normal range of motion. He exhibits no edema.   Lymphadenopathy:     He has no cervical adenopathy.   Neurological: He is alert and oriented to person, place, and time.   Skin: Skin is warm and dry. No erythema.   Psychiatric: He has a normal mood and affect. His behavior is normal. Judgment and thought content normal.       Significant Labs:   Recent Lab Results       09/27/18  0512      Immature Granulocytes 0.3     Immature Grans (Abs) 0.02  Comment:  Mild elevation in immature granulocytes is  non specific and   can be seen in a variety of conditions including stress response,   acute inflammation, trauma and pregnancy. Correlation with other   laboratory and clinical findings is essential.       Anion Gap 9     aPTT 30.6  Comment:  aPTT therapeutic range = 39-69 seconds     Baso # 0.04     Basophil% 0.6     BUN, Bld 20     Calcium 10.1     Chloride 100     CO2 27     Creatinine 1.3     Differential Method Automated     eGFR if African American >60.0     eGFR if non  56.1  Comment:  Calculation used to obtain the estimated glomerular filtration  rate (eGFR) is the CKD-EPI equation.        Eos # 0.3     Eosinophil% 4.1     Glucose 92     Gran # (ANC) 4.8     Gran% 69.6     Hematocrit 38.6     Hemoglobin 11.8     Coumadin Monitoring INR 2.4  Comment:  Coumadin Therapy:  2.0 - 3.0 for INR for all indicators except mechanical heart valves  and antiphospholipid syndromes which should use 2.5 - 3.5.         Comment:  Results are increased in hemolyzed samples.     Lymph # 1.2     Lymph% 17.2     Magnesium 2.3     MCH 24.7     MCHC 30.6     MCV 81     Mono # 0.6     Mono% 8.2     MPV 11.0     nRBC 0     Phosphorus 3.3     Platelets 169     Potassium 4.1     Protime 23.0     RBC 4.77     RDW 19.2     Sodium 136     WBC 6.86

## 2018-09-27 NOTE — ASSESSMENT & PLAN NOTE
-S/P HM III DT 7/2017  -Speed 5200  -INR supratherapeutic. Holding coumadin.   -Antiplatelets: No ASA secondary to recurrent GIB.    -LDH at baseline.   -Echo pending.    Procedure: Device Interrogation Including analysis of device parameters  Current Settings: Ventricular Assist Device    TXP LVAD INTERROGATIONS 9/27/2018 9/27/2018 9/26/2018 9/26/2018 9/26/2018 9/26/2018 9/26/2018   Type HeartMate3 HeartMate3 HeartMate3 HeartMate3 HeartMate3 HeartMate3 HeartMate3   Flow 3.8 4.1 3.6 3.4 4.1 3.5 3.8   Speed 5200 5200 5200 5200 5200 5200 5200   PI 4.1 5.1 7.5 6.3 5.3 6.3 5.3   Power ([place]) 3.6 3.7 3.6 3.4 3.8 3.6 3.6   LSL 4800 - 4800 - - - -   Pulsatility Intermittent pulse - Intermittent pulse - - - Intermittent pulse

## 2018-09-27 NOTE — NURSING
Notified MD Gutierrez pt's /82 (95) and doppler 100/0. Telephone verbal order to administer Hydralazine 10 mg IVP, will complete order and continue to monitor.

## 2018-09-27 NOTE — NURSING
Results for MIRTA LOPEZ (MRN [id number]) as of 9/27/2018 07:51   Ref. Range 9/25/2018 19:48 9/26/2018 03:46 9/26/2018 07:00 9/27/2018 05:12   Hematocrit Latest Ref Range: 40.0 - 54.0 %  33.0 (L)  38.6 (L)   HCT changed to 39 on Heartmate III monitor.

## 2018-09-27 NOTE — PROGRESS NOTES
[facility]  Cardiac Electrophysiology  Progress Note    Admission Date: 9/25/2018  Code Status: Full Code   Attending Physician: Mohit Ambrosio MD   Expected Discharge Date: 9/28/2018  Principal Problem:VT (ventricular tachycardia)    Subjective:     Interval History: Feeling well, no events overnight, was mildly hypertensive    Tele: Regular rhythm with significant baseline artifact in all leads. 80 bpm with V-pacing spikes evident    ROS  Objective:     Vital Signs (Most Recent):  Temp: 98.5 °F (36.9 °C) (09/27/18 1516)  Pulse: 80 (09/27/18 1516)  Resp: 18 (09/27/18 1516)  BP: (!) 80/0 (09/27/18 1516)  SpO2: 99 % (09/27/18 1516) Vital Signs (24h Range):  Temp:  [97.9 °F (36.6 °C)-98.5 °F (36.9 °C)] 98.5 °F (36.9 °C)  Pulse:  [64-91] 80  Resp:  [17-30] 18  SpO2:  [97 %-99 %] 99 %  BP: ()/(0-101) 80/0     Weight: 68.9 kg (151 lb 14.4 oz)  Body mass index is 22.43 kg/m².     SpO2: 99 %  O2 Device (Oxygen Therapy): room air    Physical Exam   Constitutional: He is oriented to person, place, and time. He appears well-developed and well-nourished. No distress.   HENT:   Head: Normocephalic and atraumatic.   Mouth/Throat: Oropharynx is clear and moist.   Eyes: EOM are normal. Pupils are equal, round, and reactive to light.   Neck: Normal range of motion. Neck supple. No JVD present.   Cardiovascular: Regular rhythm. Exam reveals no gallop and no friction rub.   No murmur heard.  LVAD hum obscuring heart sounds   Pulmonary/Chest: Effort normal and breath sounds normal. No respiratory distress. He has no wheezes. He has no rales. He exhibits no tenderness.   Abdominal: Soft. Bowel sounds are normal. He exhibits no distension. There is no tenderness.   Musculoskeletal: Normal range of motion. He exhibits no edema.   Lymphadenopathy:     He has no cervical adenopathy.   Neurological: He is alert and oriented to person, place, and time.   Skin: Skin is warm and dry. No erythema.   Psychiatric: He has  a normal mood and affect. His behavior is normal. Judgment and thought content normal.       Significant Labs:   Recent Lab Results       09/27/18  0512      Immature Granulocytes 0.3     Immature Grans (Abs) 0.02  Comment:  Mild elevation in immature granulocytes is non specific and   can be seen in a variety of conditions including stress response,   acute inflammation, trauma and pregnancy. Correlation with other   laboratory and clinical findings is essential.       Anion Gap 9     aPTT 30.6  Comment:  aPTT therapeutic range = 39-69 seconds     Baso # 0.04     Basophil% 0.6     BUN, Bld 20     Calcium 10.1     Chloride 100     CO2 27     Creatinine 1.3     Differential Method Automated     eGFR if African American >60.0     eGFR if non  56.1  Comment:  Calculation used to obtain the estimated glomerular filtration  rate (eGFR) is the CKD-EPI equation.        Eos # 0.3     Eosinophil% 4.1     Glucose 92     Gran # (ANC) 4.8     Gran% 69.6     Hematocrit 38.6     Hemoglobin 11.8     Coumadin Monitoring INR 2.4  Comment:  Coumadin Therapy:  2.0 - 3.0 for INR for all indicators except mechanical heart valves  and antiphospholipid syndromes which should use 2.5 - 3.5.         Comment:  Results are increased in hemolyzed samples.     Lymph # 1.2     Lymph% 17.2     Magnesium 2.3     MCH 24.7     MCHC 30.6     MCV 81     Mono # 0.6     Mono% 8.2     MPV 11.0     nRBC 0     Phosphorus 3.3     Platelets 169     Potassium 4.1     Protime 23.0     RBC 4.77     RDW 19.2     Sodium 136     WBC 6.86         Assessment and Plan:     * VT (ventricular tachycardia)    69 yo M with NICM s/p HM3 LVAD implantation, s/p ICD (Medtronic) who is admitted following sustained MM VT () s/p successful overdrive pacing. Mexiletine added to AAD regimen.      Recommendations:  -- No further VT events on telemetry.   -- Continue mexiletine 200 mg Q12H and amio 600mg daily  -- Follow up amiodarone level, as results  appear to be delayed            Elijah Grace MD  Cardiac Electrophysiology  [facility]

## 2018-09-27 NOTE — SUBJECTIVE & OBJECTIVE
Interval History: Feeling much better this am. No more VT overnight.    Scheduled Meds:   amiodarone  600 mg Oral Daily    dronabinol  5 mg Oral BID    furosemide  20 mg Oral BID    magnesium oxide  400 mg Oral Daily    mexiletine  200 mg Oral Q12H    pantoprazole  40 mg Oral Daily    pravastatin  20 mg Oral QHS    spironolactone  25 mg Oral Daily     PRN Meds:HYDROcodone-acetaminophen, influenza, ondansetron, polyethylene glycol    Review of patient's allergies indicates:   Allergen Reactions    Asa [aspirin] Other (See Comments)     Bleeding ulcer     Objective:     Vital Signs (Most Recent):  Temp: 98.3 °F (36.8 °C) (09/27/18 0743)  Pulse: 75 (09/27/18 0743)  Resp: 18 (09/27/18 0743)  BP: (!) 82/0 (09/27/18 0743)  SpO2: 98 % (09/27/18 0743) Vital Signs (24h Range):  Temp:  [97.9 °F (36.6 °C)-98.4 °F (36.9 °C)] 98.3 °F (36.8 °C)  Pulse:  [64-91] 75  Resp:  [15-33] 18  SpO2:  [97 %-100 %] 98 %  BP: ()/(0-101) 82/0     Patient Vitals for the past 72 hrs (Last 3 readings):   Weight   09/27/18 0700 68.9 kg (151 lb 14.4 oz)   09/26/18 0900 72.8 kg (160 lb 7.9 oz)   09/25/18 0946 69 kg (152 lb 1.9 oz)     Body mass index is 22.43 kg/m².      Intake/Output Summary (Last 24 hours) at 9/27/2018 1126  Last data filed at 9/27/2018 0900  Gross per 24 hour   Intake 673 ml   Output 3650 ml   Net -2977 ml        Telemetry: nsr    Physical Exam   Constitutional: He is oriented to person, place, and time. He appears well-developed and well-nourished.   HENT:   Head: Normocephalic.   Eyes: Pupils are equal, round, and reactive to light.   Neck: Normal range of motion. Neck supple.   Cardiovascular: Normal rate and regular rhythm.   Vad hum   Pulmonary/Chest: Effort normal and breath sounds normal.   Abdominal: Soft. Bowel sounds are normal.   Musculoskeletal: Normal range of motion.   Neurological: He is alert and oriented to person, place, and time.   Skin: Skin is warm and dry.   Psychiatric: He has a normal mood  and affect. His behavior is normal.   Nursing note and vitals reviewed.    Significant Labs:  CBC:  Recent Labs   Lab  09/25/18   1015  09/26/18   0346  09/27/18   0512   WBC  6.76  6.23  6.86   RBC  4.42*  3.95*  4.77   HGB  11.0*  9.7*  11.8*   HCT  36.8*  33.0*  38.6*   PLT  149*  131*  169   MCV  83  84  81*   MCH  24.9*  24.6*  24.7*   MCHC  29.9*  29.4*  30.6*     BNP:  Recent Labs   Lab  09/25/18   1015  09/26/18   0346   BNP  141*  937*     CMP:  Recent Labs   Lab  09/25/18   1015  09/26/18   0346 09/27/18   0512   GLU  123*  95  92   CALCIUM  9.7  9.4  10.1   ALBUMIN  3.6  3.2*   --    PROT  7.1  6.3   --    NA  135*  138  136   K  4.7  4.4  4.1   CO2  21*  22*  27   CL  103  106  100   BUN  17  18  20   CREATININE  1.7*  1.4  1.3   ALKPHOS  127  102   --    ALT  99*  67*   --    AST  173*  93*   --    BILITOT  0.5  0.4   --       Coagulation:   Recent Labs   Lab  09/25/18   1444  09/26/18   0346  09/27/18   0512   INR  3.2*  3.2*  3.6*  2.4*   APTT   --   34.1*  30.6     LDH:  Recent Labs   Lab  09/25/18   1015  09/26/18   0346  09/27/18   0512   LDH  416*  202  229     Microbiology:  Microbiology Results (last 7 days)     ** No results found for the last 168 hours. **        I have reviewed all pertinent labs within the past 24 hours.    Estimated Creatinine Clearance: 53 mL/min (based on SCr of 1.3 mg/dL).    Diagnostic Results:  I have reviewed all pertinent imaging results/findings within the past 24 hours.

## 2018-09-27 NOTE — PLAN OF CARE
Problem: Patient Care Overview  Goal: Plan of Care Review  Outcome: Ongoing (interventions implemented as appropriate)  Plan of care reviewed with patient and spouse.  Discharge planned for 9/28/18.  Lasix changed from 60 mg INJ BID to 20 mg tablet oral BID to diurese patient.  Heartmate III (LVAD) speed set at 5200, HCT changed to 39, dressing changed with soap and water by spouse, LVAD parameters WNL, PI events noted.   AICD in left upper chest wall.  Patient has remained free of falls/trauma/injury by using appropriate lighting, nonskid socks, by keeping area free of debris, and spouse will remain at bedside.  Patient is independent but standby assist necessary to assist with LVAD cables/equipment.  Will order flu vaccine per patient request.  Patient and spouse verbalized understanding of all instructions.

## 2018-09-28 NOTE — SUBJECTIVE & OBJECTIVE
Interval History: Feeling better. No events on tele overnight    Scheduled Meds:   amiodarone  600 mg Oral Daily    dronabinol  5 mg Oral TID    furosemide  20 mg Oral BID    magnesium oxide  400 mg Oral Daily    mexiletine  200 mg Oral Q12H    mirtazapine  7.5 mg Oral Nightly    pantoprazole  40 mg Oral Daily    pravastatin  20 mg Oral QHS    spironolactone  25 mg Oral Daily     PRN Meds:HYDROcodone-acetaminophen, ondansetron, polyethylene glycol    Review of patient's allergies indicates:   Allergen Reactions    Asa [aspirin] Other (See Comments)     Bleeding ulcer     Objective:     Vital Signs (Most Recent):  Temp: (!) 78 °F (25.6 °C) (09/28/18 0800)  Pulse: 78 (09/28/18 1200)  Resp: 16 (09/28/18 1116)  BP: 100/72 (09/28/18 1116)  SpO2: 99 % (09/28/18 1116) Vital Signs (24h Range):  Temp:  [78 °F (25.6 °C)-98.5 °F (36.9 °C)] 78 °F (25.6 °C)  Pulse:  [60-84] 78  Resp:  [16-18] 16  SpO2:  [97 %-99 %] 99 %  BP: ()/(0-72) 100/72     Patient Vitals for the past 72 hrs (Last 3 readings):   Weight   09/28/18 0722 68.5 kg (150 lb 14.5 oz)   09/27/18 0700 68.9 kg (151 lb 14.4 oz)   09/26/18 0900 72.8 kg (160 lb 7.9 oz)     Body mass index is 22.28 kg/m².      Intake/Output Summary (Last 24 hours) at 9/28/2018 1241  Last data filed at 9/28/2018 0900  Gross per 24 hour   Intake 600 ml   Output 1275 ml   Net -675 ml        Telemetry: nsr    Physical Exam   Constitutional: He is oriented to person, place, and time. He appears well-developed and well-nourished.   HENT:   Head: Normocephalic.   Eyes: Pupils are equal, round, and reactive to light.   Neck: Normal range of motion. Neck supple.   Cardiovascular: Normal rate and regular rhythm.   Vad hum   Pulmonary/Chest: Effort normal and breath sounds normal.   Abdominal: Soft. Bowel sounds are normal.   Musculoskeletal: Normal range of motion.   Neurological: He is alert and oriented to person, place, and time.   Skin: Skin is warm and dry.   Psychiatric: He  has a normal mood and affect. His behavior is normal.   Nursing note and vitals reviewed.    Significant Labs:  CBC:  Recent Labs   Lab  09/26/18 0346 09/27/18 0512 09/28/18   0334   WBC  6.23  6.86  6.28   RBC  3.95*  4.77  4.78   HGB  9.7*  11.8*  12.2*   HCT  33.0*  38.6*  38.5*   PLT  131*  169  144*   MCV  84  81*  81*   MCH  24.6*  24.7*  25.5*   MCHC  29.4*  30.6*  31.7*     BNP:  Recent Labs   Lab  09/25/18   1015  09/26/18 0346  09/28/18   0334   BNP  141*  937*  241*     CMP:  Recent Labs   Lab  09/25/18   1015  09/26/18 0346 09/27/18 0512 09/28/18   0334   GLU  123*  95  92  79   CALCIUM  9.7  9.4  10.1  9.7   ALBUMIN  3.6  3.2*   --   3.4*   PROT  7.1  6.3   --   6.9   NA  135*  138  136  135*   K  4.7  4.4  4.1  4.1   CO2  21*  22*  27  27   CL  103  106  100  99   BUN  17  18  20  25*   CREATININE  1.7*  1.4  1.3  1.4   ALKPHOS  127  102   --   100   ALT  99*  67*   --   39   AST  173*  93*   --   40   BILITOT  0.5  0.4   --   0.7      Coagulation:   Recent Labs   Lab  09/26/18 0346 09/27/18 0512 09/28/18   0334   INR  3.6*  2.4*  1.6*   APTT  34.1*  30.6  28.3     LDH:  Recent Labs   Lab  09/26/18 0346 09/27/18   0512 09/28/18   0334   LDH  202  229  213     Microbiology:  Microbiology Results (last 7 days)     ** No results found for the last 168 hours. **        I have reviewed all pertinent labs within the past 24 hours.    Estimated Creatinine Clearance: 48.9 mL/min (based on SCr of 1.4 mg/dL).    Diagnostic Results:  I have reviewed all pertinent imaging results/findings within the past 24 hours.

## 2018-09-28 NOTE — ASSESSMENT & PLAN NOTE
69 yo M with NICM s/p HM3 LVAD implantation, s/p ICD (Medtronic) who is admitted following sustained MM VT () s/p successful overdrive pacing. Mexiletine added to AAD regimen.      Recommendations:  -- No further VT events on telemetry.   -- Continue mexiletine 200 mg Q12H and amio 600mg daily  -- Will follow up amiodarone level, results still pending  -- will sign off, please reconsult as needed

## 2018-09-28 NOTE — PROGRESS NOTES
[facility]  Cardiac Electrophysiology  Progress Note    Admission Date: 9/25/2018  Code Status: Full Code   Attending Physician: Mohit Ambrosio MD   Expected Discharge Date: 9/28/2018  Principal Problem:VT (ventricular tachycardia)    Subjective:     Interval History: No events overnight, feeling well    ROS  Objective:     Vital Signs (Most Recent):  Temp: 97.6 °F (36.4 °C) (09/28/18 0756)  Pulse: 60 (09/28/18 0756)  Resp: 16 (09/28/18 0756)  BP: (!) 86/0 (09/28/18 0800)  SpO2: 97 % (09/28/18 0756) Vital Signs (24h Range):  Temp:  [97.6 °F (36.4 °C)-98.5 °F (36.9 °C)] 97.6 °F (36.4 °C)  Pulse:  [60-81] 60  Resp:  [16-18] 16  SpO2:  [97 %-99 %] 97 %  BP: (80-99)/(0-71) 86/0     Weight: 68.5 kg (150 lb 14.5 oz)  Body mass index is 22.28 kg/m².     SpO2: 97 %  O2 Device (Oxygen Therapy): room air    Physical Exam   Constitutional: He is oriented to person, place, and time. He appears well-developed and well-nourished. No distress.   HENT:   Head: Normocephalic and atraumatic.   Mouth/Throat: Oropharynx is clear and moist.   Eyes: EOM are normal. Pupils are equal, round, and reactive to light.   Neck: Normal range of motion. Neck supple. No JVD present.   Cardiovascular: Regular rhythm. Exam reveals no gallop and no friction rub.   No murmur heard.  LVAD hum obscuring heart sounds   Pulmonary/Chest: Effort normal and breath sounds normal. No respiratory distress. He has no wheezes. He has no rales. He exhibits no tenderness.   Abdominal: Soft. Bowel sounds are normal. He exhibits no distension. There is no tenderness.   Musculoskeletal: Normal range of motion. He exhibits no edema.   Lymphadenopathy:     He has no cervical adenopathy.   Neurological: He is alert and oriented to person, place, and time.   Skin: Skin is warm and dry. No erythema.   Psychiatric: He has a normal mood and affect. His behavior is normal. Judgment and thought content normal.       Significant Labs:   Recent Lab Results        09/28/18  0334      Immature Granulocytes 0.3     Immature Grans (Abs) 0.02  Comment:  Mild elevation in immature granulocytes is non specific and   can be seen in a variety of conditions including stress response,   acute inflammation, trauma and pregnancy. Correlation with other   laboratory and clinical findings is essential.       Albumin 3.4     Alkaline Phosphatase 100     ALT 39     Anion Gap 9     aPTT 28.3  Comment:  aPTT therapeutic range = 39-69 seconds     AST 40     Baso # 0.05     Basophil% 0.8     Bilirubin, Direct 0.3     Total Bilirubin 0.7  Comment:  For infants and newborns, interpretation of results should be based  on gestational age, weight and in agreement with clinical  observations.  Premature Infant recommended reference ranges:  Up to 24 hours.............<8.0 mg/dL  Up to 48 hours............<12.0 mg/dL  3-5 days..................<15.0 mg/dL  6-29 days.................<15.0 mg/dL         Comment:  Values of less than 100 pg/ml are consistent with non-CHF populations.     BUN, Bld 25     Calcium 9.7     Chloride 99     CO2 27     Creatinine 1.4     CRP 1.6     Differential Method Automated     eGFR if African American 59.3     eGFR if non  51.3  Comment:  Calculation used to obtain the estimated glomerular filtration  rate (eGFR) is the CKD-EPI equation.        Eos # 0.3     Eosinophil% 5.1     Glucose 79     Gran # (ANC) 3.9     Gran% 62.3     Hematocrit 38.5     Hemoglobin 12.2     Coumadin Monitoring INR 1.6  Comment:  Coumadin Therapy:  2.0 - 3.0 for INR for all indicators except mechanical heart valves  and antiphospholipid syndromes which should use 2.5 - 3.5.         Comment:  Results are increased in hemolyzed samples.     Lymph # 1.4     Lymph% 22.1     Magnesium 2.2     MCH 25.5     MCHC 31.7     MCV 81     Mono # 0.6     Mono% 9.4     MPV 11.2     nRBC 0     Phosphorus 3.7     Platelets 144     Potassium 4.1     Prealbumin 24     Total Protein 6.9      Protime 16.0     RBC 4.78     RDW 19.0     Sodium 135     WBC 6.28               Assessment and Plan:     * VT (ventricular tachycardia)    67 yo M with NICM s/p HM3 LVAD implantation, s/p ICD (Medtronic) who is admitted following sustained MM VT () s/p successful overdrive pacing. Mexiletine added to AAD regimen.      Recommendations:  -- No further VT events on telemetry.   -- Continue mexiletine 200 mg Q12H and amio 600mg daily  -- Will follow up amiodarone level, results still pending  -- will sign off, please reconsult as needed            Elijah Grace MD  Cardiac Electrophysiology  [facility]

## 2018-09-28 NOTE — PROGRESS NOTES
Discussed case with , with regards to patient being Inpatient since 9-25-18 and EP procedure set for 10-9-18: MD will speak with patient and notify this RN of MDs recommendations.     Alyssa GASTLEUM CCM

## 2018-09-28 NOTE — ASSESSMENT & PLAN NOTE
-S/P HM III DT 7/2017  -Speed 5200  -INR subtherapeutic. Pt is a no Heparin bridge due to GIBs. Restart coumadin and get repeat INR Mon 10/1/18   -Antiplatelets: No ASA secondary to recurrent GIB.    -LDH at baseline, 200s.   -Echo 9/26/18 showed LVEDD 7.2, LVEF 15-20%    Procedure: Device Interrogation Including analysis of device parameters  Current Settings: Ventricular Assist Device    TXP LVAD INTERROGATIONS 9/28/2018 9/28/2018 9/28/2018 9/27/2018 9/27/2018 9/27/2018 9/27/2018   Type HeartMate3 (No Data) HeartMate3 HeartMate3 HeartMate3 HeartMate3 HeartMate3   Flow 3.6 - - 3.6 3.4 3.4 3.8   Speed 5200 - - 5200 5200 5200 5200   PI 6.3 - - 4.8 3.9 6.6 4.1   Power ([place]) 3.7 - - 3.5 3.5 3.6 3.6   LSL 4800 - - - 4800 4800 4800   Pulsatility Intermittent pulse - - - Intermittent pulse Intermittent pulse Intermittent pulse

## 2018-09-28 NOTE — NURSING
Results for MIRTA LOPEZ (MRN [id number]) as of 9/28/2018 08:04   Ref. Range 9/26/2018 03:46 9/26/2018 07:00 9/27/2018 05:12 9/28/2018 03:34   Hematocrit Latest Ref Range: 40.0 - 54.0 % 33.0 (L)  38.6 (L) 38.5 (L)   Changed HCT to 38 on Heartmate III monitor.

## 2018-09-28 NOTE — PROGRESS NOTES
DISCHARGE NOTE:    Suman Hayden is a 68 y.o. male s/p HM3 lvad admitted for evaluation of diaphoresis, vomiting and palpitations.     Past Medical History:   Diagnosis Date    Arthritis     Cardiomyopathy     CHF (congestive heart failure)     Coronary artery disease     Encounter for blood transfusion     Gastric polyp     Hyperlipidemia     Hypertension     Hypotension, iatrogenic     LVAD (left ventricular assist device) present     Obesity     S/P implantation of automatic cardioverter/defibrillator (AICD)     Ventricular tachycardia        Hospital Course: Mr. Lozano coumadin was held for inr of 3.2 on admission. He was evaluated by EP for episodes of sustained VT. His amiodarone dose was increased and he was started on mexiletine.      Allergies:   Review of patient's allergies indicates:   Allergen Reactions    Asa [aspirin] Other (See Comments)     Bleeding ulcer       Patient Pharmacy: [facility]     Pharmacy Interventions/Recommendations:  1) INR Goal: 2-3    2) Antiplatelet Agents: none    3) Heparin Bridging:  No bridging; history of GI bleeding    4) Patient Counseling/Education:  Medication details completed      5) INR Follow-Up/Discharge Needs:  Monday with coumadin clinic. Warfarin started at 2.5mg orally daily. His inr today is 1.6. Warfarin was held during hospital stay. Amiodarone dose increased from 400mg to 600mg orally daily.     See list of discharge medication for dosing instructions.     Suman Hayden and his caregiver verbalized their understanding and had the opportunity to ask questions.      Discharge Medications:   Suman Hayden   Home Medication Instructions MYRANDA:[id number]    Printed on:09/28/18 1213   Medication Information                      amiodarone (PACERONE) 200 MG Tab  Take 3 tablets (600 mg total) by mouth once daily.             dronabinol (MARINOL) 5 MG capsule  TAKE ONE CAPSULE BY MOUTH 3 TIMES DAILY BEFORE MEALS             furosemide  (LASIX) 20 MG tablet  Take 1 tablet (20 mg total) by mouth 2 (two) times daily.             HYDROcodone-acetaminophen (NORCO) 5-325 mg per tablet  Take 1 tablet by mouth every 6 (six) hours as needed for Pain.             magnesium oxide (MAG-OX) 400 mg tablet  Take 1 tablet (400 mg total) by mouth once daily.             mexiletine (MEXITIL) 200 MG Cap  Take 1 capsule (200 mg total) by mouth every 12 (twelve) hours.             mirtazapine (REMERON) 7.5 MG Tab  Take 1 tablet (7.5 mg total) by mouth nightly.             pantoprazole (PROTONIX) 40 MG tablet  Take 1 tablet (40 mg total) by mouth once daily.             polyethylene glycol (GLYCOLAX) 17 gram PwPk  Take 17 g by mouth daily as needed (constipation).             pravastatin (PRAVACHOL) 20 MG tablet  Take 1 tablet (20 mg total) by mouth every evening.             spironolactone (ALDACTONE) 25 MG tablet  Take 1 tablet (25 mg total) by mouth once daily.             warfarin (COUMADIN) 5 MG tablet  Take 1/2 tab (2.5 mg) orally daily

## 2018-09-28 NOTE — NURSING
Telemetry called to report HR = 35 for a few seconds.  Patient asymptomatic.  HR =84.  Spouse at bedside.

## 2018-09-28 NOTE — SUBJECTIVE & OBJECTIVE
Interval History: No events overnight, feeling well    ROS  Objective:     Vital Signs (Most Recent):  Temp: 97.6 °F (36.4 °C) (09/28/18 0756)  Pulse: 60 (09/28/18 0756)  Resp: 16 (09/28/18 0756)  BP: (!) 86/0 (09/28/18 0800)  SpO2: 97 % (09/28/18 0756) Vital Signs (24h Range):  Temp:  [97.6 °F (36.4 °C)-98.5 °F (36.9 °C)] 97.6 °F (36.4 °C)  Pulse:  [60-81] 60  Resp:  [16-18] 16  SpO2:  [97 %-99 %] 97 %  BP: (80-99)/(0-71) 86/0     Weight: 68.5 kg (150 lb 14.5 oz)  Body mass index is 22.28 kg/m².     SpO2: 97 %  O2 Device (Oxygen Therapy): room air    Physical Exam   Constitutional: He is oriented to person, place, and time. He appears well-developed and well-nourished. No distress.   HENT:   Head: Normocephalic and atraumatic.   Mouth/Throat: Oropharynx is clear and moist.   Eyes: EOM are normal. Pupils are equal, round, and reactive to light.   Neck: Normal range of motion. Neck supple. No JVD present.   Cardiovascular: Regular rhythm. Exam reveals no gallop and no friction rub.   No murmur heard.  LVAD hum obscuring heart sounds   Pulmonary/Chest: Effort normal and breath sounds normal. No respiratory distress. He has no wheezes. He has no rales. He exhibits no tenderness.   Abdominal: Soft. Bowel sounds are normal. He exhibits no distension. There is no tenderness.   Musculoskeletal: Normal range of motion. He exhibits no edema.   Lymphadenopathy:     He has no cervical adenopathy.   Neurological: He is alert and oriented to person, place, and time.   Skin: Skin is warm and dry. No erythema.   Psychiatric: He has a normal mood and affect. His behavior is normal. Judgment and thought content normal.       Significant Labs:   Recent Lab Results       09/28/18  0334      Immature Granulocytes 0.3     Immature Grans (Abs) 0.02  Comment:  Mild elevation in immature granulocytes is non specific and   can be seen in a variety of conditions including stress response,   acute inflammation, trauma and pregnancy.  Correlation with other   laboratory and clinical findings is essential.       Albumin 3.4     Alkaline Phosphatase 100     ALT 39     Anion Gap 9     aPTT 28.3  Comment:  aPTT therapeutic range = 39-69 seconds     AST 40     Baso # 0.05     Basophil% 0.8     Bilirubin, Direct 0.3     Total Bilirubin 0.7  Comment:  For infants and newborns, interpretation of results should be based  on gestational age, weight and in agreement with clinical  observations.  Premature Infant recommended reference ranges:  Up to 24 hours.............<8.0 mg/dL  Up to 48 hours............<12.0 mg/dL  3-5 days..................<15.0 mg/dL  6-29 days.................<15.0 mg/dL         Comment:  Values of less than 100 pg/ml are consistent with non-CHF populations.     BUN, Bld 25     Calcium 9.7     Chloride 99     CO2 27     Creatinine 1.4     CRP 1.6     Differential Method Automated     eGFR if African American 59.3     eGFR if non  51.3  Comment:  Calculation used to obtain the estimated glomerular filtration  rate (eGFR) is the CKD-EPI equation.        Eos # 0.3     Eosinophil% 5.1     Glucose 79     Gran # (ANC) 3.9     Gran% 62.3     Hematocrit 38.5     Hemoglobin 12.2     Coumadin Monitoring INR 1.6  Comment:  Coumadin Therapy:  2.0 - 3.0 for INR for all indicators except mechanical heart valves  and antiphospholipid syndromes which should use 2.5 - 3.5.         Comment:  Results are increased in hemolyzed samples.     Lymph # 1.4     Lymph% 22.1     Magnesium 2.2     MCH 25.5     MCHC 31.7     MCV 81     Mono # 0.6     Mono% 9.4     MPV 11.2     nRBC 0     Phosphorus 3.7     Platelets 144     Potassium 4.1     Prealbumin 24     Total Protein 6.9     Protime 16.0     RBC 4.78     RDW 19.0     Sodium 135     WBC 6.28

## 2018-09-28 NOTE — PROGRESS NOTES
[facility]  Heart Transplant  Progress Note    Patient Name: Suman Hayden  MRN: [id number]  Admission Date: 9/25/2018  Hospital Length of Stay: 3 days  Attending Physician: Mohit Ambrosio MD  Primary Care Provider: Joe Ernst MD  Principal Problem:VT (ventricular tachycardia)    Subjective:     Interval History: Feeling better. No events on tele overnight    Scheduled Meds:   amiodarone  600 mg Oral Daily    dronabinol  5 mg Oral TID    furosemide  20 mg Oral BID    magnesium oxide  400 mg Oral Daily    mexiletine  200 mg Oral Q12H    mirtazapine  7.5 mg Oral Nightly    pantoprazole  40 mg Oral Daily    pravastatin  20 mg Oral QHS    spironolactone  25 mg Oral Daily     PRN Meds:HYDROcodone-acetaminophen, ondansetron, polyethylene glycol    Review of patient's allergies indicates:   Allergen Reactions    Asa [aspirin] Other (See Comments)     Bleeding ulcer     Objective:     Vital Signs (Most Recent):  Temp: (!) 78 °F (25.6 °C) (09/28/18 0800)  Pulse: 78 (09/28/18 1200)  Resp: 16 (09/28/18 1116)  BP: 100/72 (09/28/18 1116)  SpO2: 99 % (09/28/18 1116) Vital Signs (24h Range):  Temp:  [78 °F (25.6 °C)-98.5 °F (36.9 °C)] 78 °F (25.6 °C)  Pulse:  [60-84] 78  Resp:  [16-18] 16  SpO2:  [97 %-99 %] 99 %  BP: ()/(0-72) 100/72     Patient Vitals for the past 72 hrs (Last 3 readings):   Weight   09/28/18 0722 68.5 kg (150 lb 14.5 oz)   09/27/18 0700 68.9 kg (151 lb 14.4 oz)   09/26/18 0900 72.8 kg (160 lb 7.9 oz)     Body mass index is 22.28 kg/m².      Intake/Output Summary (Last 24 hours) at 9/28/2018 1241  Last data filed at 9/28/2018 0900  Gross per 24 hour   Intake 600 ml   Output 1275 ml   Net -675 ml        Telemetry: nsr    Physical Exam   Constitutional: He is oriented to person, place, and time. He appears well-developed and well-nourished.   HENT:   Head: Normocephalic.   Eyes: Pupils are equal, round, and reactive to light.   Neck: Normal range of motion. Neck supple.    Cardiovascular: Normal rate and regular rhythm.   Vad hum   Pulmonary/Chest: Effort normal and breath sounds normal.   Abdominal: Soft. Bowel sounds are normal.   Musculoskeletal: Normal range of motion.   Neurological: He is alert and oriented to person, place, and time.   Skin: Skin is warm and dry.   Psychiatric: He has a normal mood and affect. His behavior is normal.   Nursing note and vitals reviewed.    Significant Labs:  CBC:  Recent Labs   Lab  09/26/18   0346 09/27/18 0512 09/28/18   0334   WBC  6.23  6.86  6.28   RBC  3.95*  4.77  4.78   HGB  9.7*  11.8*  12.2*   HCT  33.0*  38.6*  38.5*   PLT  131*  169  144*   MCV  84  81*  81*   MCH  24.6*  24.7*  25.5*   MCHC  29.4*  30.6*  31.7*     BNP:  Recent Labs   Lab  09/25/18   1015  09/26/18   0346  09/28/18   0334   BNP  141*  937*  241*     CMP:  Recent Labs   Lab  09/25/18   1015  09/26/18   0346  09/27/18   0512 09/28/18   0334   GLU  123*  95  92  79   CALCIUM  9.7  9.4  10.1  9.7   ALBUMIN  3.6  3.2*   --   3.4*   PROT  7.1  6.3   --   6.9   NA  135*  138  136  135*   K  4.7  4.4  4.1  4.1   CO2  21*  22*  27  27   CL  103  106  100  99   BUN  17  18  20  25*   CREATININE  1.7*  1.4  1.3  1.4   ALKPHOS  127  102   --   100   ALT  99*  67*   --   39   AST  173*  93*   --   40   BILITOT  0.5  0.4   --   0.7      Coagulation:   Recent Labs   Lab  09/26/18   0346  09/27/18   0512 09/28/18   0334   INR  3.6*  2.4*  1.6*   APTT  34.1*  30.6  28.3     LDH:  Recent Labs   Lab  09/26/18   0346  09/27/18   0512 09/28/18   0334   LDH  202  229  213     Microbiology:  Microbiology Results (last 7 days)     ** No results found for the last 168 hours. **        I have reviewed all pertinent labs within the past 24 hours.    Estimated Creatinine Clearance: 48.9 mL/min (based on SCr of 1.4 mg/dL).    Diagnostic Results:  I have reviewed all pertinent imaging results/findings within the past 24 hours.    Assessment and Plan:     68 BM with NICM, S/P Heartmate 3  as DT therapy 7/27/17, ICD revision 11/20/17 with Dr. Vidal (DC ICD placed with active RA/LV leads (RV lead capped during revision) that was complicated by pocket hematoma, who presented to the ER this morning with the chief complaint of weakness. On arrival his pulse was noted to be 200 and appeared consistent with VT on telemetry. He states that he woke up early this morning(around 4am) with palpitations. Reports associated diaphoresis, lightheadedness and bubbling sensation in the stomach with nausea and 3 episodes of emesis. States that he threw up his morning medications, vomit appeared yellowish without any blood or coffee-ground appearance. Cards Fellow came to bedside and interrogated ICD. He was paced out successfully. He is currently nsr with rate ~80. He states that he is feeling much better now. Lying in bed at about 15 degree angle in nad.           * VT (ventricular tachycardia)    -Overdrive paced at bedside in ER by Cards Fellow.   -EP consulted- Continue mexiletine 200 mg Q12H and amio 600mg daily per Dr Zee.  -Keep K>4, Mag>2.          LVAD (left ventricular assist device) present    -S/P HM III DT 7/2017  -Speed 5200  -INR subtherapeutic. Pt is a no Heparin bridge due to GIBs. Restart coumadin and get repeat INR Mon 10/1/18   -Antiplatelets: No ASA secondary to recurrent GIB.    -LDH at baseline, 200s.   -Echo 9/26/18 showed LVEDD 7.2, LVEF 15-20%    Procedure: Device Interrogation Including analysis of device parameters  Current Settings: Ventricular Assist Device    TXP LVAD INTERROGATIONS 9/28/2018 9/28/2018 9/28/2018 9/27/2018 9/27/2018 9/27/2018 9/27/2018   Type HeartMate3 (No Data) HeartMate3 HeartMate3 HeartMate3 HeartMate3 HeartMate3   Flow 3.6 - - 3.6 3.4 3.4 3.8   Speed 5200 - - 5200 5200 5200 5200   PI 6.3 - - 4.8 3.9 6.6 4.1   Power ([place]) 3.7 - - 3.5 3.5 3.6 3.6   LSL 4800 - - - 4800 4800 4800   Pulsatility Intermittent pulse - - - Intermittent pulse Intermittent pulse  Intermittent pulse           Acute on chronic combined systolic and diastolic heart failure    -Volume improved.  -Transitioned to PO Lasix.               Susannah Elizabeth PA-C  Heart Transplant  [facility]

## 2018-09-28 NOTE — PROGRESS NOTES
Hospital Course: Mr. Lozano coumadin was held for inr of 3.2 on admission. He was evaluated by EP for episodes of sustained VT. His amiodarone dose was increased and he was started on mexiletine.       Allergies:   Review of patient's allergies indicates:   Allergen Reactions    Asa [aspirin] Other (See Comments)   Bleeding ulcer       Patient Pharmacy: [facility]     Pharmacy Interventions/Recommendations:   1) INR Goal: 2-3     2) Antiplatelet Agents: none     3) Heparin Bridging:  No bridging; history of GI bleeding     4) Patient Counseling/Education:  Medication details completed     5) INR Follow-Up/Discharge Needs:  Monday with coumadin clinic. Warfarin started at 2.5mg orally daily. His inr today is 1.6. Warfarin was held during hospital stay. Amiodarone dose increased from 400mg to 600mg orally daily.

## 2018-09-28 NOTE — PLAN OF CARE
Problem: Patient Care Overview  Goal: Plan of Care Review  Outcome: Revised  Pt free of falls/trauma/injuries.  Denies c/o SOB, CP, or discomfort.  Generalized skin remains CDI; No edema noted.  Pt being diuresed with PO Lasix; diuresing well.   Wt decreased since yesterday. Pt to be d/c tomorrow.  Pt tolerating plan of care.

## 2018-09-28 NOTE — PROGRESS NOTES
"Discharge    Pt presents in room with wife. Pt aaox4 with neutral affect. Pt states in agreement with plan to discharge to home today. Pt denies need for out pt PT. Pt reports he is "independent." Pt's states son will transport. .Pt reports coping well and denies further needs, questions, concerns at this time and none indicated. Providing psychosocial and counseling support, education, resources, assistance and discharge planning as indicated. SW remains available.    "

## 2018-10-01 NOTE — PROGRESS NOTES
Wife confirmed dose. Ensure 3x daily , pt is constipated has been taking polyethylene glycol (GLYCOLAX) 17 gram PwPk.  No other changes reported

## 2018-10-02 NOTE — DISCHARGE SUMMARY
[facility]  Heart Transplant  Discharge Summary      Patient Name: Suman Hayden  MRN: [id number]  Admission Date: 9/25/2018  Hospital Length of Stay: 3 days  Discharge Date and Time: 9/28/2018 8:14 PM  Attending Physician: No att. providers found   Discharging Provider: Susannah Elizabeth PA-C  Primary Care Provider: Joe Ernst MD     HPI: 68 BM with NICM, S/P Heartmate 3 as DT therapy 7/27/17, ICD revision 11/20/17 with Dr. Vidal (DC ICD placed with active RA/LV leads (RV lead capped during revision) that was complicated by pocket hematoma, who presented to the ER this morning with the chief complaint of weakness. On arrival his pulse was noted to be 200 and appeared consistent with VT on telemetry. He states that he woke up early this morning(around 4am) with palpitations. Reports associated diaphoresis, lightheadedness and bubbling sensation in the stomach with nausea and 3 episodes of emesis. States that he threw up his morning medications, vomit appeared yellowish without any blood or coffee-ground appearance. Cards Fellow came to bedside and interrogated ICD. He was paced out successfully. He is currently nsr with rate ~80. He states that he is feeling much better now. Lying in bed at about 15 degree angle in nad.     * No surgery found *     Hospital Course: Pt was admitted to [facility] for VT (overdrive paced in ER by Cards Fellow) and EP was consulted. EP increased patient's Amio dose to 600 daily and started mexiletine 200 BID. Pt did not have any further VT during hospitalization. Pt was also in ADHF on admit and was diuresed with IVP Lasix initially then transitioned back to PO home dose of Lasix. Pt will have f/u in VAD clinic in 2 weeks.    Consults (From admission, onward)        Status Ordering Provider     Inpatient consult to Cardiology  Once     Provider:  (Not yet assigned)    Completed BIANCA FABIAN     Inpatient consult to Electrophysiology  Once     Provider:   (Not yet assigned)    Completed CALI CHAMBERS              Pending Diagnostic Studies:     None        Final Active Diagnoses:    Diagnosis Date Noted POA    PRINCIPAL PROBLEM:  VT (ventricular tachycardia) [I47.2] 07/05/2017 Unknown    LVAD (left ventricular assist device) present [Z95.811] 07/29/2017 Not Applicable    Acute on chronic combined systolic and diastolic heart failure [I50.43] 09/26/2018 Yes    Chest pain [R07.9] 09/25/2018 Yes    AICD (automatic cardioverter/defibrillator) present [Z95.810] 08/06/2017 Yes    Chronic combined systolic and diastolic congestive heart failure [I50.42] 07/07/2017 Yes      Problems Resolved During this Admission:      Discharged Condition: stable    Disposition: Home or Self Care      Patient Instructions:      Diet Adult Regular     Order Specific Question Answer Comments   Na restriction, if any: 2gNa      Notify your health care provider if you experience any of the following:  temperature >100.4     Notify your health care provider if you experience any of the following:  persistent nausea and vomiting or diarrhea     Notify your health care provider if you experience any of the following:  severe uncontrolled pain     Notify your health care provider if you experience any of the following:  redness, tenderness, or signs of infection (pain, swelling, redness, odor or green/yellow discharge around incision site)     Notify your health care provider if you experience any of the following:  difficulty breathing or increased cough     Notify your health care provider if you experience any of the following:  severe persistent headache     Notify your health care provider if you experience any of the following:  worsening rash     Notify your health care provider if you experience any of the following:  persistent dizziness, light-headedness, or visual disturbances     Notify your health care provider if you experience any of the following:  increased confusion or  weakness     Activity as tolerated     Medications:  Reconciled Home Medications:      Medication List      START taking these medications    mexiletine 200 MG Cap  Commonly known as:  MEXITIL  Take 1 capsule (200 mg total) by mouth every 12 (twelve) hours.     mirtazapine 7.5 MG Tab  Commonly known as:  REMERON  Take 1 tablet (7.5 mg total) by mouth nightly.        CHANGE how you take these medications    amiodarone 200 MG Tab  Commonly known as:  PACERONE  Take 3 tablets (600 mg total) by mouth once daily.  What changed:    · medication strength  · how much to take        CONTINUE taking these medications    dronabinol 5 MG capsule  Commonly known as:  MARINOL  TAKE ONE CAPSULE BY MOUTH 3 TIMES DAILY BEFORE MEALS     furosemide 20 MG tablet  Commonly known as:  LASIX  Take 1 tablet (20 mg total) by mouth 2 (two) times daily.     HYDROcodone-acetaminophen 5-325 mg per tablet  Commonly known as:  NORCO  Take 1 tablet by mouth every 6 (six) hours as needed for Pain.     magnesium oxide 400 mg tablet  Commonly known as:  MAG-OX  Take 1 tablet (400 mg total) by mouth once daily.     pantoprazole 40 MG tablet  Commonly known as:  PROTONIX  Take 1 tablet (40 mg total) by mouth once daily.     polyethylene glycol 17 gram Pwpk  Commonly known as:  GLYCOLAX  Take 17 g by mouth daily as needed (constipation).     pravastatin 20 MG tablet  Commonly known as:  PRAVACHOL  Take 1 tablet (20 mg total) by mouth every evening.     spironolactone 25 MG tablet  Commonly known as:  ALDACTONE  Take 1 tablet (25 mg total) by mouth once daily.     warfarin 5 MG tablet  Commonly known as:  COUMADIN  Take 1/2 tab (2.5 mg) orally daily        STOP taking these medications    potassium chloride SA 20 MEQ tablet  Commonly known as:  DANIEL-JEFFERSON WALKER-RUDDY Elizabeth PA-C  Heart Transplant  [facility]

## 2018-10-02 NOTE — HOSPITAL COURSE
Pt was admitted to [facility] for VT (overdrive paced in ER by Cards Fellow) and EP was consulted. EP increased patient's Amio dose to 600 daily and started mexiletine 200 BID. Pt did not have any further VT during hospitalization. Pt was also in ADHF on admit and was diuresed with IVP Lasix initially then transitioned back to PO home dose of Lasix. Pt will have f/u in VAD clinic in 2 weeks.

## 2018-10-04 NOTE — PHYSICIAN QUERY
PT Name: Suman Hayden  MR #: [id number]     Physician Query Form - Documentation Clarification      CDS/: Sergio Josue Jr, RN               Contact information:[email or web address]    This form is a permanent document in the medical record.     Query Date: October 4, 2018    By submitting this query, we are merely seeking further clarification of documentation. Please utilize your independent clinical judgment when addressing the question(s) below.    The Medical record reflects the following:    Supporting Clinical Findings Location in Medical Record   The primary encounter diagnosis was Chest pain. Diagnoses of LVAD (left ventricular assist device) present, Left ventricular assist device (LVAD) complication    Interrogation of Ventricular assist device was performed with physician analysis of device parameters and review of device function. I have personally reviewed the interrogation findings and agree with findings as stated.     Type HeartMate3  Flow 3.8  Speed 5200  PI 4.1  Power ([place]) 3.6  LSL 4800  Pulsatility Intermittent pulse    Heartmate III (LVAD) speed set at 5200, HCT changed to 39, dressing changed with soap and water by spouse, LVAD parameters WNL, PI events noted   9/25 ED Provider Note        9/27 Heart Transplant Progress Note        9/27 Heart Transplant Progress Note              9/27 Nursing Plan of Care Note                                                                                      Doctor, Please specify diagnosis or diagnoses associated with above clinical findings.  Specify the Status of the LVAD Complication noted by the ED Provider    Provider Use Only    (   x )No LVAD Complication Noted for This Admit    (    )LVAD Complication (Please Specify)____________________________________    (    )Other_____________________________________________________________                                                                                                            [  ] Clinically undetermined

## 2018-10-04 NOTE — PROGRESS NOTES
Wife confirmed dose , reports pt has been skipping meals no appetite,amiodarone (PACERONE) 200 MG 3x daily  and mexiletine (MEXITIL) 200 MG BID.

## 2018-10-04 NOTE — PHYSICIAN QUERY
PT Name: Suman Hayden  MR #: [id number]     Physician Query Form - Documentation Clarification      CDS/: Sergio Josue Jr, RN               Contact information:[email or web address]    This form is a permanent document in the medical record.     Query Date: October 4, 2018    By submitting this query, we are merely seeking further clarification of documentation. Please utilize your independent clinical judgment when addressing the question(s) below.    The Medical record reflects the following:    Supporting Clinical Findings Location in Medical Record   68 BM with chronic systolic heart failure secondary to non ischemic cardiomyopathy    Left Ventricle: The left ventricle is normal in size, with an end-diastolic diameter of 5.2 cm, and an end-systolic diameter of 4.8 cm. LV wall thickness is normal, with the septum and the posterior wall each measuring 0.9 cm across. Relative wall   thickness was normal at 0.35, and the LV mass index was 107.0 g/m2 consistent with normal left ventricular mass.    HPI: 68 BM with NICM, S/P Heartmate 3 as DT therapy 7/27/17 9/25 Cardiology Consult Note      9/26 2D Echo Report                9/28 Discharge Summary                                                                                      Doctor, Please specify diagnosis or diagnoses associated with above clinical findings.  Further Specify the Nonischemic Cardiomyopathy     Provider Use Only    (  x  )Nonischemic Dilated Cardiomyopathy    (    )Nonischemic Hypertrophic Cardiomyopathy    (    )Other______________________________________________________                                                                                                           [  ] Clinically undetermined

## 2018-10-05 NOTE — PROGRESS NOTES
Attempted multiple times yesterday and today to reach pt on cell and home phone after receiving message via Epic. Pt's wife answered cell # this morning and states she has spoken to Medicaid  who states needing Medicare and BCBS to pay bills for 2018. Wife states there is nothing for team to do and is only calling to report. SW phoned TIERNEY Pacheco  who states pt may not qualify for Medicaid but will not hurt for pt to apply. SW called pt's wife back and left v/m stating what TIERNEY Pacheco said. Wife aware of how to reach SW should needs arise. SW remains available.

## 2018-10-05 NOTE — TELEPHONE ENCOUNTER
Called pt to follow up with lab results. Spoke with him and wife, Kia.  Both verbalize he is feeling well.  Denies SOB. Denies swelling in feet, ankles or stomach. Confirmed he is taking lasix 20 mg BID, not taking any potassium.  Will re-check labs MOnday.

## 2018-10-08 NOTE — TELEPHONE ENCOUNTER
Called Mrs. Hayden to confirm signs of bleeding.  She reports he has no bleeding and feels good.  Asked about alarms, only alarms are from last week when they were coming here for appt and he had VT.  I explained with INR of 6.6, he may not have his ablation tomorrow.  She will call EP now to discuss.  If the procedure is cx, we can obtain labs in [place] tomorrow instead of [facility] and asked her to let us know.   Kia verbalized understanding and agreement.

## 2018-10-08 NOTE — PROGRESS NOTES
Wife Kia Hayden was contacted and confirmed correct dose .  Wife  Stated pt is on ''   medical marijuana'' dronabinol (MARINOL) 5 MG capsule BID and mexiletine (MEXITIL) 200 BID.  Amino 3x daily 200 mg .  Reports every time she is doing patient reading different lights pop on vad machine and stated when pt was admitted in hospital a week before a child  ran into his vad device.  No other changes reported .

## 2018-10-08 NOTE — TELEPHONE ENCOUNTER
Home Health Recert 03/30/2018 to 05/28/2018 with [facility] , Dr Hammang Patel. Sn, PT, OT services.

## 2018-10-08 NOTE — PROGRESS NOTES
No bleeding reported . Patient wife junito advised and verbalized understanding.  Per garcia orders ''Have her hold today and tomorrow, repeat INR tomorrow, as long as no bleeding''.  CBC has been attached

## 2018-10-09 NOTE — ED NOTES
"Pt states his LVAD started to sound last Tuesday and then started to show "low flow" today.    Patient moved to ED room 8, patient assisted onto stretcher and changed into a gown. Patient placed on cardiac monitor, continuous pulse oximetry and automatic blood pressure cuff. Bed placed in low locked position, side rails up x 2, call light is within reach of patient or family, orientation to room and explanation of wait provided to family and patient, alarms set and turned on for monitor and pulse ox, awaiting MD evaluation and orders, will continue to monitor.    Patient identifies self as uSman Hayden    LVD: Site on abdomen is dressed and clean with no signs of infection. Mechanical whooshing heard with auscultation.  LOC: The patient is awake, alert and aware of environment with an appropriate affect, the patient is oriented x 3 and speaking appropriately.  APPEARANCE: Patient resting comfortably and in no acute distress, patient is clean and well groomed, patient's clothing is properly fastened.  SKIN: The skin is warm and dry, color consistent with ethnicity, patient has normal skin turgor and moist mucus membranes, skin intact, no breakdown or bruising noted.  MUSCULOSKELETAL: Patient moving all extremities well, no obvious swelling or deformities noted.  RESPIRATORY: Airway is open and patent, respirations are spontaneous, patient has a normal effort and rate, no accessory muscle use noted.  CARDIAC: Patient has a normal rate and rhythm, no periphreal edema noted, capillary refill < 3 seconds.  ABDOMEN: Soft and non tender to palpation, no distention noted.  NEUROLOGIC: PERRL, eyes open spontaneously, behavior appropriate to situation, follows commands, facial expression symmetrical, bilateral hand grasp equal and even, purposeful motor response noted, normal sensation in all extremities when touched with a finger.    "

## 2018-10-09 NOTE — ED NOTES
Called to speak with the [facility] regarding status of transport, they called in a within the hour transfer with [facility].

## 2018-10-09 NOTE — ED NOTES
Alex with [facility] called with information regarding transfer:    No bed yet but request for be in [facility] ordered.    Accepting physician Dr. Nadia Bautista.

## 2018-10-09 NOTE — PROGRESS NOTES
Rebecca called in critical result that Zack Mckinney MA already has an encounter opened for.  She will call and question patient then advise per Pharm D order.  I notified YOVANY Thomas D of critical result.

## 2018-10-09 NOTE — TELEPHONE ENCOUNTER
"VAD Coordinator on call paged by [facility] communication center that pt called 911 for "red heart despite being connected to batteries or wall power".   Called to check on pt.  Spoke to pt wife.  She reports pt is feeling ok and is awake but is "having a red alarm that says to call hospital".  Current VAD numbers 2.6 flow, speed 5200, 11.7 pi, power 3.7. LVAD pump running symbol illuminated.   Pt wife denies checking pt BP today nor does she know what his BP "has been running".  She reports pt went to local lab and his INR is high.  Labs reviewed and INR > 5.0.  Had pt wife check last 6 alarms since pt wife kept reporting it was a "call hospital alarm".  Last 6 alarms checked and noted pt had low flow alarms.  Pt wife speaking to EMS and told them they will not be needed.  Instructed pt to allow EMS to assess pt and transport if needed.  Instructed pt to keep in touch with VAD team.   aware.   "

## 2018-10-09 NOTE — ED NOTES
Alex with [facility] called - patient will be in room 3074.    Called report to [phone] but was unable to reach anyone at this time. Will call back.

## 2018-10-09 NOTE — ED PROVIDER NOTES
SCRIBE #1 NOTE: I, Dc Lozoya, am scribing for, and in the presence of, Gurmeet Mar MD. I have scribed the entire note.      History      Chief Complaint   Patient presents with    LVAD Problem     LVAD reading low flow alarm earlier today, LVAD coordinator is aware       Review of patient's allergies indicates:   Allergen Reactions    Asa [aspirin] Other (See Comments)     Bleeding ulcer        HPI   HPI    10/9/2018, 1:03 PM   History obtained from the patient      History of Present Illness: Suman Hayden is a 68 y.o. male patient with a PMHx of CHF, cardiomegaly, HTN, HLD, and a LVAD who presents to the Emergency Department for an evaluation of an LVAD problem which onset suddenly x1 hour PTA.  Pt reports his LVAD's low flow alarm began o go off which prompted him to call EMS. Pt's vital were reportedly stable for the entire trip to the ED. Pt had x1 more episode en route here his LVAD low flow alarm went off. Pt states his coumadin levels are high but otherwise he feels fine. Patient denies any fever, chills, CP, SOB, leg swelling, N/V, abd pain, HA, weakness, and all other sxs at this time. Pt denies tx PTA. No further complaints or concerns at this time.       Arrival mode: EMS    PCP: Joe Ernst MD       Past Medical History:  Past Medical History:   Diagnosis Date    Arthritis     Cardiomyopathy     CHF (congestive heart failure)     Coronary artery disease     Encounter for blood transfusion     Gastric polyp     Hyperlipidemia     Hypertension     Hypotension, iatrogenic     LVAD (left ventricular assist device) present     Obesity     S/P implantation of automatic cardioverter/defibrillator (AICD)     Ventricular tachycardia        Past Surgical History:  Past Surgical History:   Procedure Laterality Date    ABDOMINAL SURGERY      APPENDECTOMY      CARDIAC CATHETERIZATION      CARDIAC DEFIBRILLATOR PLACEMENT      CARDIAC DEFIBRILLATOR PLACEMENT      CLOSURE-STERNAL  WOUND N/A 7/28/2017    Performed by Sunny Downing MD at [facility] OR 2ND FLR    COLONOSCOPY      COLONOSCOPY N/A 3/9/2018    Procedure: COLONOSCOPY;  Surgeon: Andres Chatterjee MD;  Location: [facility] (2ND FLR);  Service: Endoscopy;  Laterality: N/A;    COLONOSCOPY N/A 8/8/2018    Procedure: COLONOSCOPY;  Surgeon: Andres Chatterjee MD;  Location: [facility] (2ND FLR);  Service: Endoscopy;  Laterality: N/A;    COLONOSCOPY N/A 8/8/2018    Performed by Andres Chatterjee MD at [facility] ENDO (2ND FLR)    COLONOSCOPY N/A 3/9/2018    Performed by Andres Chatterjee MD at [facility] (2ND FLR)    DEVICE ASSISTED ENTEROSCOPY-ANTEROGRADE N/A 1/25/2018    Performed by Andres Chatterjee MD at [facility] (2ND FLR)    DEVICE ASSISTED ENTEROSCOPY-ANTEROGRADE N/A 12/14/2017    Performed by Andres Chatterjee MD at [facility] (2ND FLR)    EGD (ESOPHAGOGASTRODUODENOSCOPY) N/A 8/8/2018    Performed by Andres Chatterjee MD at [facility] ENDO (2ND FLR)    ESOPHAGOGASTRODUODENOSCOPY      ESOPHAGOGASTRODUODENOSCOPY N/A 8/8/2018    Procedure: EGD (ESOPHAGOGASTRODUODENOSCOPY);  Surgeon: Andres Chatterjee MD;  Location: [facility] (2ND FLR);  Service: Endoscopy;  Laterality: N/A;    ESOPHAGOGASTRODUODENOSCOPY (EGD) N/A 3/6/2018    Performed by Andres Chatterjee MD at [facility] ENDO (2ND FLR)    ESOPHAGOGASTRODUODENOSCOPY (EGD) N/A 12/8/2017    Performed by Gagan Barger MD at [facility] ENDO (2ND FLR)    ESOPHAGOGASTRODUODENOSCOPY (EGD) N/A 8/17/2017    Performed by Kishore Mills MD at [facility] ENDO (2ND FLR)    EXPLORATORY-LAPAROTOMY with small bowel resection  N/A 3/13/2018    Performed by Kenyon Tellez MD at [facility] OR 2ND FLR    HEART CATH-RIGHT Right 7/3/2017    Performed by Angie Torres MD at [facility] CATH LAB    INSERTION-LEFT VENTRICULAR ASSIST DEVICE N/A 7/27/2017    Performed by Sunny Downing MD at [facility] OR 2ND FLR    INSERTION-LEFT VENTRICULAR ASSIST DEVICE N/A 7/25/2017    Performed by Sunny Downing MD at [facility] OR 2ND FLR    LEFT VENTRICULAR  ASSIST DEVICE  07/27/2017    PLACEMENT-NEOPERICARDIUM N/A 7/28/2017    Performed by [facility] Downing MD at [facility] OR 2ND FLR    RESECTION-BOWEL  3/13/2018    Performed by Kenyon Tellez MD at [facility] OR 2ND FLR    Retrograde deivce assisted enteroscopy N/A 1/26/2018    Performed by Jesse Davis MD at [facility] ENDO (2ND FLR)    REVISION-LEAD-ICD N/A 11/20/2017    Performed by Rubén Winchester MD at [facility] CATH LAB    TONSILLECTOMY      TRANSESOPHAGEAL ECHOCARDIOGRAM (GLENN) N/A 1/9/2018    Performed by [facility] Diagnostic Provider at [facility] CATH LAB         Family History:  Family History   Problem Relation Age of Onset    Heart disease Mother     Heart disease Father        Social History:  Social History     Tobacco Use    Smoking status: Never Smoker    Smokeless tobacco: Never Used   Substance and Sexual Activity    Alcohol use: No    Drug use: No    Sexual activity: Not Currently       ROS   Review of Systems   Constitutional: Negative for chills, diaphoresis and fever.   HENT: Negative for congestion and sore throat.    Respiratory: Negative for cough and shortness of breath.    Cardiovascular: Negative for chest pain, palpitations and leg swelling.   Gastrointestinal: Negative for abdominal distention, nausea and vomiting.   Genitourinary: Negative for dysuria, frequency, hematuria and urgency.   Musculoskeletal: Negative for back pain, neck pain and neck stiffness.   Skin: Negative for rash.   Neurological: Negative for dizziness, weakness, light-headedness and headaches.   Hematological: Does not bruise/bleed easily.   All other systems reviewed and are negative.    Physical Exam      Initial Vitals [10/09/18 1301]   BP Pulse Resp Temp SpO2   121/87 80 16 -- 100 %      MAP       --          Physical Exam  Nursing Notes and Vital Signs Reviewed.  Constitutional: Patient is in no acute distress. Well-developed and well-nourished.  Head: Atraumatic. Normocephalic.  Eyes: PERRL. EOM intact. Conjunctivae  are not pale. No scleral icterus.  ENT: Mucous membranes are moist.   Neck: Supple. Full ROM. No lymphadenopathy.  Cardiovascular: Regular rate. Mechanical LVAD noise noted.  Pulmonary/Chest: No respiratory distress. Clear to auscultation bilaterally. No wheezing or rales.  Abdominal: Soft and non-distended.  There is no tenderness.    Musculoskeletal: Moves all extremities. No obvious deformities. No edema. No calf tenderness.  Skin: Warm and dry.  Neurological:  Alert, awake, and appropriate.  Normal speech.  No acute focal neurological deficits are appreciated.  Psychiatric: Normal affect. Good eye contact. Appropriate in content.    ED Course    Critical Care  Date/Time: 10/9/2018 1:36 PM  Performed by: Gurmeet Mar MD  Authorized by: Gurmeet Mar MD   Direct patient critical care time: 15 minutes  Additional history critical care time: 10 minutes  Ordering / reviewing critical care time: 5 minutes  Documentation critical care time: 5 minutes  Consulting other physicians critical care time: 5 minutes  Consult with family critical care time: 5 minutes  Total critical care time (exclusive of procedural time) : 45 minutes  Critical care time was exclusive of separately billable procedures and treating other patients and teaching time.  Critical care was necessary to treat or prevent imminent or life-threatening deterioration of the following conditions: cardiac failure.  Critical care was time spent personally by me on the following activities: blood draw for specimens, development of treatment plan with patient or surrogate, discussions with consultants, interpretation of cardiac output measurements, evaluation of patient's response to treatment, examination of patient, obtaining history from patient or surrogate, ordering and performing treatments and interventions, ordering and review of laboratory studies, ordering and review of radiographic studies, pulse oximetry, re-evaluation of patient's  "condition and review of old charts.        ED Vital Signs:  Vitals:    10/09/18 1301 10/09/18 1350 10/09/18 1426   BP: 121/87 107/80    Pulse: 80 79 80   Resp: 16 15    SpO2: 100% 100%    Weight: 68.5 kg (151 lb 0.2 oz)     Height: 5' 9" (1.753 m)         Abnormal Lab Results:  Labs Reviewed   CBC W/ AUTO DIFFERENTIAL - Abnormal; Notable for the following components:       Result Value    RBC 4.32 (*)     Hemoglobin 10.7 (*)     Hematocrit 33.4 (*)     MCV 77 (*)     MCH 24.8 (*)     RDW 18.8 (*)     All other components within normal limits   COMPREHENSIVE METABOLIC PANEL - Abnormal; Notable for the following components:    BUN, Bld 24 (*)     eGFR if  59 (*)     eGFR if non  51 (*)     All other components within normal limits   B-TYPE NATRIURETIC PEPTIDE - Abnormal; Notable for the following components:     (*)     All other components within normal limits   TROPONIN I - Abnormal; Notable for the following components:    Troponin I 0.060 (*)     All other components within normal limits   URINALYSIS, REFLEX TO URINE CULTURE    Narrative:     Preferred Collection Type->Urine, Clean Catch   CK        All Lab Results:  Results for orders placed or performed during the hospital encounter of 10/09/18   CBC auto differential   Result Value Ref Range    WBC 6.17 3.90 - 12.70 K/uL    RBC 4.32 (L) 4.60 - 6.20 M/uL    Hemoglobin 10.7 (L) 14.0 - 18.0 g/dL    Hematocrit 33.4 (L) 40.0 - 54.0 %    MCV 77 (L) 82 - 98 fL    MCH 24.8 (L) 27.0 - 31.0 pg    MCHC 32.0 32.0 - 36.0 g/dL    RDW 18.8 (H) 11.5 - 14.5 %    Platelets 196 150 - 350 K/uL    MPV 10.8 9.2 - 12.9 fL    Gran # (ANC) 4.0 1.8 - 7.7 K/uL    Lymph # 1.4 1.0 - 4.8 K/uL    Mono # 0.5 0.3 - 1.0 K/uL    Eos # 0.2 0.0 - 0.5 K/uL    Baso # 0.04 0.00 - 0.20 K/uL    Gran% 65.5 38.0 - 73.0 %    Lymph% 22.0 18.0 - 48.0 %    Mono% 8.8 4.0 - 15.0 %    Eosinophil% 3.1 0.0 - 8.0 %    Basophil% 0.6 0.0 - 1.9 %    Aniso Slight     Poik Slight  "    Hypo Occasional     Ovalocytes Occasional     Target Cells Occasional     Stomatocytes Present     Differential Method Automated    Comprehensive metabolic panel   Result Value Ref Range    Sodium 139 136 - 145 mmol/L    Potassium 3.6 3.5 - 5.1 mmol/L    Chloride 103 95 - 110 mmol/L    CO2 24 23 - 29 mmol/L    Glucose 79 70 - 110 mg/dL    BUN, Bld 24 (H) 8 - 23 mg/dL    Creatinine 1.4 0.5 - 1.4 mg/dL    Calcium 10.0 8.7 - 10.5 mg/dL    Total Protein 7.8 6.0 - 8.4 g/dL    Albumin 3.8 3.5 - 5.2 g/dL    Total Bilirubin 0.5 0.1 - 1.0 mg/dL    Alkaline Phosphatase 75 55 - 135 U/L    AST 31 10 - 40 U/L    ALT 31 10 - 44 U/L    Anion Gap 12 8 - 16 mmol/L    eGFR if African American 59 (A) >60 mL/min/1.73 m^2    eGFR if non African American 51 (A) >60 mL/min/1.73 m^2   Urinalysis, Reflex to Urine Culture Urine, Clean Catch   Result Value Ref Range    Specimen UA Urine, Clean Catch     Color, UA Yellow Yellow, Straw, [place]    Appearance, UA Clear Clear    pH, UA 7.0 5.0 - 8.0    Specific Gravity, UA 1.015 1.005 - 1.030    Protein, UA Negative Negative    Glucose, UA Negative Negative    Ketones, UA Negative Negative    Bilirubin (UA) Negative Negative    Occult Blood UA Negative Negative    Nitrite, UA Negative Negative    Urobilinogen, UA Negative <2.0 EU/dL    Leukocytes, UA Negative Negative   Brain natriuretic peptide   Result Value Ref Range     (H) 0 - 99 pg/mL   CK   Result Value Ref Range    CPK 78 20 - 200 U/L   Troponin I   Result Value Ref Range    Troponin I 0.060 (H) 0.000 - 0.026 ng/mL            The EKG was ordered, reviewed, and independently interpreted by the ED provider.  Interpretation time: 13:21  Rate: 87 BPM  Rhythm: AV dual-paced rhythm with occaisional premature ventricular complexes  Interpretation: No STEMI.      The Emergency Provider reviewed the vital signs and test results, which are outlined above.    ED Discussion     1:31 PM: Consult with Dr. Bautista (Cardiology) at [facility] concerning pt. There are no LVAD services, which the patient requires, offered at [facility] at this time. Dr. Bautista expresses understanding and will accept transfer for LVAD.  Accepting Facility: [facility]  Accepting Physician: Dr. Bautista    1:32 PM: Re-evaluated pt. I have discussed test results, shared treatment plan, and the need for admission with patient and family at bedside. Pt and family express understanding at this time and agree with all information. All questions answered. Pt and family have no further questions or concerns at this time. Pt is ready for admit.      ED Medication(s):  Medications - No data to display          Medical Decision Making    Medical Decision Making:   Clinical Tests:   Lab Tests: Ordered and Reviewed  Medical Tests: Ordered and Reviewed           Scribe Attestation:   Scribe #1: I performed the above scribed service and the documentation accurately describes the services I performed. I attest to the accuracy of the note.    Attending:   Physician Attestation Statement for Scribe #1: I, Gurmeet Mar MD, personally performed the services described in this documentation, as scribed by Dc Lozoya, in my presence, and it is both accurate and complete.          Clinical Impression       ICD-10-CM ICD-9-CM   1. LVAD (left ventricular assist device) present Z95.811 V43.21   2. Complication involving left ventricular assist device (LVAD), initial encounter T82.9XXA 996.72       Disposition:   Disposition: Transferred  Condition: Stable         Gurmeet Mar MD  10/09/18 3658

## 2018-10-09 NOTE — PROGRESS NOTES
Attempted to get report on patient. Nurse unavailable at this time. Message left. Awaiting call back.

## 2018-10-10 NOTE — PROGRESS NOTES
10/09/18 2308 10/09/18 2311   Vital Signs   Temp 97.8 °F (36.6 °C) --    Temp src Oral --    Pulse 79 --    Heart Rate Source Monitor --    Resp 18 --    SpO2 98 % --    Pulse Oximetry Type Intermittent --    O2 Device (Oxygen Therapy) room air --    BP (!) 69/51 (!) 68/0   BP Location Left arm --    BP Method Automatic Doppler   Patient Position Lying --      MD Suzy notified of post-hydralazine recheck. MAP 56. Pt asymptomatic resting in bed. Informed pt of hypotensive symptoms to notify RN, change positions slowly, and have assistance out of bed. Wife at bedside. MD gave no further orders at this time. Will monitor.

## 2018-10-10 NOTE — SIGNIFICANT EVENT
Patient received IVP hydralazine for HTN crisis in an LVAD patient upon admission (elevated Doppler and low flow alarms, INR >5). He is LH with CNs 2-12 grossly intact. Doppler 52 with two low flow alarms. Concern for hypovolemia. Give 500 cc NS bolus x1. The patient denies bleeding symptoms including changes in stool color. Hgb 10.7 on admission (12.5 on 10/4). Repeat CBC and obtain T&S. Case discussed with Dr. Bautista.

## 2018-10-10 NOTE — ASSESSMENT & PLAN NOTE
- interrogation with low flow alarms  - BP control  - LVAD order set  - hold warfarin with supratherapeutic INR   COPIED FROM MOTHER'S CHART    SW assessment due to history of depression.  met with patient and . Patient states that she's been on Zoloft for 4-5 years. Originally her PCP (Dr. Romie Bains) prescribed this medication, but OB has been prescribing lately. Per patient, her main symptom is more anxiety than depression, and she states that this \"runs in the family. \"  Patient denies any increase in anxiety/depression during pregnancy, and she plans on staying on Zoloft indefinitely at this point.  provided education and literature on support available thru [employer] ([employer]). [employer] Warmline:  5-022-095-4PPD (7783). WWW. POST[employer]. NET    Family was informed of signs/symptoms, forms of intervention (medication, counseling, education), and resources (local coordinators available telephonically, monthly support group in [place], weekly \"chat with expert\" phone sessions). Additionally, patient was provided with [facility] [place] Checklist.\"      Discussed importance of self-care and accepting help when offered. Family was encouraged to contact me with any questions/needs -  contact information provided.       [facility]vina [facility], [address] [facility]
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
## 2018-10-10 NOTE — PROGRESS NOTES
"   10/10/18 0142 10/10/18 0143   Vital Signs   Pulse 80 --    Resp 18 --    SpO2 --  99 %   BP (!) 88/63 (!) 78/0   MAP (mmHg) 71 --      VS after 500 cc NS bolus. Pt stated he "is feeling a lot better." denies hypotensive symptoms. MD Suzy notified.  LVAD #s: Speed 5200, Flow 3.7, Power 3.6, PI 3.9. No further orders at this time. Will monitor.  "

## 2018-10-10 NOTE — PLAN OF CARE
Problem: Patient Care Overview  Goal: Plan of Care Review  Outcome: Ongoing (interventions implemented as appropriate)  Updated care plan w/ pt.  Address all needs throughout shift. No issues w/ LVAD during am shift (frequent low flow alarms on previous shift)  No falls during shift.  Monitor H&H- last 9.0/29.2- GI consulted, also pt have an ablation on this admission pending Dr. Pompa. (EP )

## 2018-10-10 NOTE — ASSESSMENT & PLAN NOTE
- c/w amiodarone 400mg daily and mexilitene  Patient was supposed to get ablation with Dr. Rodriguez.  Will discuss with EP if this can be explored during this visit

## 2018-10-10 NOTE — CONSULTS
Consult received for nutritional assessment. Spoke with pt and wife at bedside who report pt with good PO intake at home, wt has been steady. Noted decrease in wt over past month but wife states she believes those were weights without batteries (they weigh with batteries on at home). Also noted concern for hypovolemia on admit which may be contributing to mild wt decrease.    Please note pt drinks up to 3 Boosts per day at home. Noted supratherapeutic INR over past few days. Not receiving Boost could be contributing. Recommend ordering Boost Plus with meals to better match pt's diet at home. Please re-consult with other nutrition-related concerns.

## 2018-10-10 NOTE — PROGRESS NOTES
10/10/18 0047 10/10/18 0055   Device   Type HeartMate3 HeartMate3   Flow 2.4 2.0   Speed 5200 5200   PI 8.1 6.6   Power ([place]) 3.4 3.4   Equipment   Alarms Yes (see comment)  (low flow) Yes (see comment)  (low flow)     Low flow alarm. Pt complaining of lightheadedness and burning eyes. DP 50/0. MD Suzy at bedside. Verbal orders for 500 cc NS. Will monitor.

## 2018-10-10 NOTE — PROGRESS NOTES
Prior to hydralazine administration, DP 90. Confirmed with MD Suzy to still administer hydralazine. Telephone orders to administer. Will monitor.

## 2018-10-10 NOTE — PLAN OF CARE
Problem: Patient Care Overview  Goal: Individualization & Mutuality  Outcome: Ongoing (interventions implemented as appropriate)  POC reviewed with pt. Pt received IVP hydralazine x1 for HTN upon admission. Pt has had 2 low flow alarms this shift; LVAD numbers documented during events. 500cc NS bolus administered for rehydration. Wife performed LVAD dressing change this shift; due 10/11. 2D echo ordered. INR 5.8.  Pt remains free from falls, trauma, injury; pt tolerating POC well. Will continue to monitor.

## 2018-10-10 NOTE — SUBJECTIVE & OBJECTIVE
Past Medical History:   Diagnosis Date    Arthritis     Cardiomyopathy     CHF (congestive heart failure)     Coronary artery disease     Encounter for blood transfusion     Gastric polyp     Hyperlipidemia     Hypertension     Hypotension, iatrogenic     LVAD (left ventricular assist device) present     Obesity     S/P implantation of automatic cardioverter/defibrillator (AICD)     Ventricular tachycardia        Past Surgical History:   Procedure Laterality Date    ABDOMINAL SURGERY      APPENDECTOMY      CARDIAC CATHETERIZATION      CARDIAC DEFIBRILLATOR PLACEMENT      CARDIAC DEFIBRILLATOR PLACEMENT      CLOSURE-STERNAL WOUND N/A 7/28/2017    Performed by Sunny Downing MD at [facility] OR 2ND FLR    COLONOSCOPY      COLONOSCOPY N/A 3/9/2018    Procedure: COLONOSCOPY;  Surgeon: Andres Chatterjee MD;  Location: [facility] (2ND FLR);  Service: Endoscopy;  Laterality: N/A;    COLONOSCOPY N/A 8/8/2018    Procedure: COLONOSCOPY;  Surgeon: Andres Chatterjee MD;  Location: [facility] (2ND FLR);  Service: Endoscopy;  Laterality: N/A;    COLONOSCOPY N/A 8/8/2018    Performed by Andres Chatterjee MD at [facility] ENDO (2ND FLR)    COLONOSCOPY N/A 3/9/2018    Performed by Andres Chatterjee MD at [facility] (2ND FLR)    DEVICE ASSISTED ENTEROSCOPY-ANTEROGRADE N/A 1/25/2018    Performed by Andres Chatterjee MD at [facility] (2ND FLR)    DEVICE ASSISTED ENTEROSCOPY-ANTEROGRADE N/A 12/14/2017    Performed by Andres Chatterjee MD at [facility] (2ND FLR)    EGD (ESOPHAGOGASTRODUODENOSCOPY) N/A 8/8/2018    Performed by Andres Chatterjee MD at [facility] (2ND FLR)    ESOPHAGOGASTRODUODENOSCOPY      ESOPHAGOGASTRODUODENOSCOPY N/A 8/8/2018    Procedure: EGD (ESOPHAGOGASTRODUODENOSCOPY);  Surgeon: Andres Chatterjee MD;  Location: [facility] (2ND FLR);  Service: Endoscopy;  Laterality: N/A;    ESOPHAGOGASTRODUODENOSCOPY (EGD) N/A 3/6/2018    Performed by Andres Chatterjee MD at [facility] (2ND FLR)    ESOPHAGOGASTRODUODENOSCOPY (EGD)  N/A 12/8/2017    Performed by Gagan Barger MD at [facility] ENDO (2ND FLR)    ESOPHAGOGASTRODUODENOSCOPY (EGD) N/A 8/17/2017    Performed by Kishore Mills MD at [facility] ENDO (2ND FLR)    EXPLORATORY-LAPAROTOMY with small bowel resection  N/A 3/13/2018    Performed by Kenyon Tellez MD at [facility] OR 2ND FLR    HEART CATH-RIGHT Right 7/3/2017    Performed by Angie Torres MD at [facility] CATH LAB    INSERTION-LEFT VENTRICULAR ASSIST DEVICE N/A 7/27/2017    Performed by Sunny Downing MD at [facility] OR 2ND FLR    INSERTION-LEFT VENTRICULAR ASSIST DEVICE N/A 7/25/2017    Performed by Sunny Downing MD at [facility] OR 2ND FLR    LEFT VENTRICULAR ASSIST DEVICE  07/27/2017    PLACEMENT-NEOPERICARDIUM N/A 7/28/2017    Performed by Sunny Downing MD at [facility] OR 2ND FLR    RESECTION-BOWEL  3/13/2018    Performed by Kenyon Tellez MD at [facility] OR 2ND FLR    Retrograde deivce assisted enteroscopy N/A 1/26/2018    Performed by Jesse Davis MD at [facility] ENDO (2ND FLR)    REVISION-LEAD-ICD N/A 11/20/2017    Performed by Rubén Winchester MD at [facility] CATH LAB    TONSILLECTOMY      TRANSESOPHAGEAL ECHOCARDIOGRAM (GLENN) N/A 1/9/2018    Performed by [facility] Diagnostic Provider at [facility] CATH LAB       Review of patient's allergies indicates:   Allergen Reactions    Asa [aspirin] Other (See Comments)     Bleeding ulcer       Current Facility-Administered Medications   Medication    [START ON 10/10/2018] amiodarone tablet 600 mg    dronabinol capsule 5 mg    furosemide tablet 20 mg    hydrALAZINE injection 15 mg    HYDROcodone-acetaminophen 5-325 mg per tablet 1 tablet    [START ON 10/10/2018] magnesium oxide tablet 400 mg    mexiletine capsule 200 mg    mirtazapine tablet 7.5 mg    [START ON 10/10/2018] pantoprazole EC tablet 40 mg    polyethylene glycol packet 17 g    pravastatin tablet 20 mg    [START ON 10/10/2018] spironolactone tablet 25 mg     Family History     Problem Relation (Age of Onset)    Heart disease  Mother, Father        Tobacco Use    Smoking status: Never Smoker    Smokeless tobacco: Never Used   Substance and Sexual Activity    Alcohol use: No    Drug use: No    Sexual activity: Not Currently     Review of Systems   Constitutional: Negative.    HENT: Negative.    Eyes: Negative.    Respiratory: Negative for shortness of breath.    Cardiovascular: Negative for chest pain, palpitations and leg swelling.   Gastrointestinal: Negative.    Endocrine: Negative.    Genitourinary: Negative.    Musculoskeletal: Negative.    Skin: Negative.    Allergic/Immunologic: Negative.    Neurological: Negative.    Hematological: Negative.    Psychiatric/Behavioral: Negative.      Objective:     Vital Signs (Most Recent):  Temp: 98.7 °F (37.1 °C) (10/09/18 1857)  Pulse: 80 (10/09/18 1857)  Resp: 16 (10/09/18 1857)  BP: (!) 108/0 (10/09/18 1857)  SpO2: 99 % (10/09/18 1857) Vital Signs (24h Range):  Temp:  [98.7 °F (37.1 °C)] 98.7 °F (37.1 °C)  Pulse:  [79-80] 80  Resp:  [15-18] 16  SpO2:  [99 %-100 %] 99 %  BP: (107-121)/(0-87) 108/0     No data found.  There is no height or weight on file to calculate BMI.    No intake or output data in the 24 hours ending 10/09/18 1924    Physical Exam   Constitutional: He is oriented to person, place, and time. He appears well-developed and well-nourished.   HENT:   Head: Normocephalic and atraumatic.   Eyes: Right eye exhibits no discharge. Left eye exhibits no discharge.   Neck: No tracheal deviation present.   Cardiovascular: Exam reveals no gallop and no friction rub.   +hum of the VAD   Pulmonary/Chest: Effort normal and breath sounds normal.   Abdominal: Soft. Bowel sounds are normal.   Musculoskeletal: He exhibits no edema.   Neurological: He is alert and oriented to person, place, and time.   Skin: Skin is warm and dry.   Psychiatric: He has a normal mood and affect.       Significant Labs:  CBC:  Recent Labs   Lab  10/04/18   0820  10/09/18   0944  10/09/18   1332   WBC  5.89   5.08  6.17   RBC  5.05  4.44*  4.32*   HGB  12.5*  10.8*  10.7*   HCT  39.8*  34.8*  33.4*   PLT  150  198  196   MCV  79*  78*  77*   MCH  24.8*  24.3*  24.8*   MCHC  31.4*  31.0*  32.0     BNP:  Recent Labs   Lab  10/04/18   0820  10/08/18   1005  10/09/18   1332   BNP  645*  574*  428*     CMP:  Recent Labs   Lab  10/04/18   0820  10/08/18   1005  10/09/18   1332   GLU  86  88  79   CALCIUM  10.0  9.8  10.0   ALBUMIN   --   3.6  3.8   PROT   --   7.5  7.8   NA  136  137  139   K  5.5*  3.8  3.6   CO2  24  27  24   CL  100  101  103   BUN  20  22  24*   CREATININE  1.4  1.4  1.4   ALKPHOS   --   81  75   ALT   --   30  31   AST   --   34  31   BILITOT   --   0.5  0.5      Coagulation:   Recent Labs   Lab  10/04/18   0820  10/08/18   1005  10/09/18   0944   INR  3.9*  6.6*  5.8*   APTT  41.8*   --    --      LDH:  No results for input(s): LDH in the last 72 hours.  Microbiology:  Microbiology Results (last 7 days)     ** No results found for the last 168 hours. **

## 2018-10-10 NOTE — PROGRESS NOTES
Admit Note     Met with patient and spouse to assess needs. Patient is a 68 y.o.  male, admitted for GI bleed.  Pt's spouse reports difficulty with INR. Pt received LVAD 7-27-17.  Pt's spouse does the dressing changes.     Patient transfered from [place]  on 10/9/2018 .  At this time, patient presents as alert and oriented x 4, good eye contact and calm.  At this time, patients caregiver presents as alert and oriented x 4, good eye contact and communicative.    Household/Family Systems     Patient resides with patient's spouse and adult son, Boni, at     [address] [address].        Support system includes spouse, adult children and multiple extended family members.    Patient does not have dependents that are need of being cared for.     Patients primary caregiver is Kia Hayden, patients spouse, phone number [phone].    Home phone:  [phone]    Emergency contacts:  Boni Hayden (son, lives with pt) [phone]  Rayna (daughter) [phone]  Malcolm (son in law) [phone]  Pt's spouse reports son in law is the  in Jaylen Hayden ( nephew) [phone]    During admission, patient's caregiver plans to stay in patient's room.  Confirmed patient and patients caregivers do have access to reliable transportation.    Cognitive Status/Learning     Patient reports reading ability as 8th grade and states patient does not have difficulty with reading, writing, seeing, hearing, comprehension, learning and memory.  Patient reports patient learns best by visual and with spouse's support.   Needed: No.   Highest education level: Grade School (0-8)       Vocation/Disability   .  Working for Income: No  If no, reason not working: Patient Choice - Retired  Patient retired in 2000 from the city of [place]. Pt was a .  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    Pt's spouse reports pt receives $1,008 per  month.  Pt's spouse reports she should be receiving SSI, however she has not received her benefit yet this month. The pt's spouse reports she has just turned 65 so she hopes there will be no changes to her benefits.     Adherence     Patient reports a high level of adherence to patients health care regimen.  Adherence counseling and education provided. Patient verbalizes understanding.    Substance Use    Patient reports the following substance usage.    Tobacco: none, patient denies any use.  Alcohol: none, patient denies any use.  Illicit Drugs/Non-prescribed Medications: none, patient denies any use.  Patient states clear understanding of the potential impact of substance use.  Substance abstinence/cessation counseling, education and resources provided and reviewed.     Services Utilizing/ADLS    Infusion Service: Prior to admission, patient utilizing? no Pt has used [facility] in the past  Home Health: Prior to admission, patient utilizing? no Pt has used [facility] in the past.    Pt's spouse reports she was told by [facility] they do not have a person skilled enough to draw the pt's blood.  Pt's spouse drives pt to the lab.  DME: Prior to admission, yes walker, wheelchair, and bedside commode.  Pt not using walker or BSC at this time.   Pulmonary/Cardiac Rehab: Prior to admission, no  Dialysis:  Prior to admission, no  Transplant Specialty Pharmacy:  Prior to admission, no.    Prior to admission, patient reports patient was independent with ADLS and was not driving. Pt's spouse drives.  Patient reports patient is not able to care for self at this time due to compromised medical condition (as documented in medical record) and physical weakness..  Patient indicates a willingness to care for self once medically cleared to do so.    Insurance/Medications    Insured by   Payor/Plan Subscr  Sex Relation Sub. Ins. ID Effective Group Num   1. [place]ARE - [place]* MIRTA LOPEZ 1950 Male  [id number] 6/1/15                                     [address]   2. [place] CROSS * MIRTA LOPEZ 1950 Male  [id number] 1/1/10 [id number]                                   [address] [address]      Primary Insurance (for UNOS reporting): Public Insurance - Medicare FFS (Fee For Service)  Secondary Insurance (for UNOS reporting): Private Insurance    Patient reports patient is able to obtain and afford medications at this time and at time of discharge.    Living Will/Healthcare Power of     Patient states patient does not have a LW and/or HCPA.   provided education regarding LW and HCPA and the completion of forms.    Coping/Mental Health    Patient is coping adequately with the aid of  family members, friends and kayli.   Patient denies mental health difficulties.   The pt and spouse report the alarms on the pt's LVAD  gave them a good scare and increased their anxiety.  The pt's spouse reports she tried to trouble shoot and follow the LVAD coordinators instructions, however she was really worried.  Worker provided support.     Discharge Planning    At time of discharge, patient plans to return to patient's home under the care of spouse.  Patients spouse will transport patient.  Per rounds today, expected discharge date has not been medically determined at this time. Patient and patients caregiver  verbalize understanding and are involved in treatment planning and discharge process.    Additional Concerns    Patient is being followed for needs, education, resources, information, emotional support, supportive counseling, and for supportive and skilled discharge plan of care.  providing ongoing psychosocial support, education, resources and d/c planning as needed.  SW remains available. Patient's caregiver verbalizes understanding and agreement with information reviewed,  availability and how to access available resources as needed. Patient verbalizes understanding and agreement with information reviewed,  social work availability, and how to access available resources as needed.

## 2018-10-10 NOTE — ASSESSMENT & PLAN NOTE
possibly related to high dose of amiodarone recently started  Decrease amiodarone dose to 400mg Daily  Hold coumadin until therapeutic.

## 2018-10-10 NOTE — PROGRESS NOTES
Notified MD Suzy of pt's K 3.2 and H/H 9.0/29.2. Telephone orders for occult blood stool sample and administer 80meq potassium PO replacement x1. Will implement and monitor.

## 2018-10-10 NOTE — HPI
"The VAD coordinator on-call was paged by the 91 communication center that the patient called 911 for a "red heart despite being connected to batteries or wall power". The VAD coordinator called spoke to the patient's wife. She reports he is feeling fine but is "having a red alarm that says to call hospital". His VAD numbers were 2.6 flow, speed 5200, 11.7 PI, and power 3.7. The LVAD pump running symbol was illuminated.  The patient and his wife have not been measuring BPs lately. His INR has been recently high > 5.0.  The VAD coordinator had his wife check the last 6 alarms since she kept reporting it was a "call hospital alarm". The last six alarms were checked, and it was noted her had low flow alarms. The VAD coordinator instructed the patient to allow EMS to assess the patient and transport if needed. The patient arrived in the ED around noon today for further assessment and was transferred to [facility].   "

## 2018-10-10 NOTE — PROGRESS NOTES
Patient admitted to [facility] from [facility]. Patient AAO x3. Denies pain. Denies shortness of breath. Patient oriented to surroundings. Bed in lowest position. Call system within reach. Side rails up x2. Unable to obtain a cuff pressure. . LVAD interrogated. Low flow alarms noted (flow 2.2). Dr. Almonte at the bedside. Liz VAD coordinator notified of patient's admit. VAD equipment reconciled upon admit. Will continue to monitor.

## 2018-10-10 NOTE — H&P
"[facility]  Heart Transplant  H&P    Patient Name: Suman Hayden  MRN: [id number]  Admission Date: 10/9/2018  Attending Physician: Graciela Bautista MD  Primary Care Provider: Joe Ernst MD  Principal Problem:<principal problem not specified>    Subjective:     History of Present Illness:  The VAD coordinator on-call was paged by the [employer] communication center that the patient called 911 for a "red heart despite being connected to batteries or wall power". The VAD coordinator called spoke to the patient's wife. She reports he is feeling fine but is "having a red alarm that says to call hospital". His VAD numbers were 2.6 flow, speed 5200, 11.7 PI, and power 3.7. The LVAD pump running symbol was illuminated.  The patient and his wife have not been measuring BPs lately. His INR has been recently high > 5.0.  The VAD coordinator had his wife check the last 6 alarms since she kept reporting it was a "call hospital alarm". The last six alarms were checked, and it was noted her had low flow alarms. The VAD coordinator instructed the patient to allow EMS to assess the patient and transport if needed. The patient arrived in the ED around noon today for further assessment and was transferred to [facility].     Past Medical History:   Diagnosis Date    Arthritis     Cardiomyopathy     CHF (congestive heart failure)     Coronary artery disease     Encounter for blood transfusion     Gastric polyp     Hyperlipidemia     Hypertension     Hypotension, iatrogenic     LVAD (left ventricular assist device) present     Obesity     S/P implantation of automatic cardioverter/defibrillator (AICD)     Ventricular tachycardia        Past Surgical History:   Procedure Laterality Date    ABDOMINAL SURGERY      APPENDECTOMY      CARDIAC CATHETERIZATION      CARDIAC DEFIBRILLATOR PLACEMENT      CARDIAC DEFIBRILLATOR PLACEMENT      CLOSURE-STERNAL WOUND N/A 7/28/2017    Performed by Sunny Downing, " MD at [facility] OR 2ND FLR    COLONOSCOPY      COLONOSCOPY N/A 3/9/2018    Procedure: COLONOSCOPY;  Surgeon: Andres Chatterjee MD;  Location: [facility] (2ND FLR);  Service: Endoscopy;  Laterality: N/A;    COLONOSCOPY N/A 8/8/2018    Procedure: COLONOSCOPY;  Surgeon: Andres Chatterjee MD;  Location: [facility] (2ND FLR);  Service: Endoscopy;  Laterality: N/A;    COLONOSCOPY N/A 8/8/2018    Performed by Andres Chatterjee MD at [facility] ENDO (2ND FLR)    COLONOSCOPY N/A 3/9/2018    Performed by Andres Chatterjee MD at [facility] (2ND FLR)    DEVICE ASSISTED ENTEROSCOPY-ANTEROGRADE N/A 1/25/2018    Performed by Andres Chatterjee MD at [facility] (2ND FLR)    DEVICE ASSISTED ENTEROSCOPY-ANTEROGRADE N/A 12/14/2017    Performed by Andres Chatterjee MD at [facility] (2ND FLR)    EGD (ESOPHAGOGASTRODUODENOSCOPY) N/A 8/8/2018    Performed by Andres Chatterjee MD at [facility] (2ND FLR)    ESOPHAGOGASTRODUODENOSCOPY      ESOPHAGOGASTRODUODENOSCOPY N/A 8/8/2018    Procedure: EGD (ESOPHAGOGASTRODUODENOSCOPY);  Surgeon: Andres Chatterjee MD;  Location: [facility] (2ND FLR);  Service: Endoscopy;  Laterality: N/A;    ESOPHAGOGASTRODUODENOSCOPY (EGD) N/A 3/6/2018    Performed by Andres Chatterjee MD at [facility] ENDO (2ND FLR)    ESOPHAGOGASTRODUODENOSCOPY (EGD) N/A 12/8/2017    Performed by Gagan Barger MD at [facility] ENDO (2ND FLR)    ESOPHAGOGASTRODUODENOSCOPY (EGD) N/A 8/17/2017    Performed by Kishore Mills MD at [facility] ENDO (2ND FLR)    EXPLORATORY-LAPAROTOMY with small bowel resection  N/A 3/13/2018    Performed by Kenyon Tellez MD at [facility] OR 2ND FLR    HEART CATH-RIGHT Right 7/3/2017    Performed by Angie Torres MD at [facility] CATH LAB    INSERTION-LEFT VENTRICULAR ASSIST DEVICE N/A 7/27/2017    Performed by Sunny Downing MD at [facility] OR 2ND FLR    INSERTION-LEFT VENTRICULAR ASSIST DEVICE N/A 7/25/2017    Performed by Sunny Downing MD at [facility] OR 2ND FLR    LEFT VENTRICULAR ASSIST DEVICE  07/27/2017    PLACEMENT-NEOPERICARDIUM  N/A 7/28/2017    Performed by Sunny Downing MD at [facility] OR 2ND FLR    RESECTION-BOWEL  3/13/2018    Performed by Kenyon Tellez MD at [facility] OR 2ND FLR    Retrograde deivce assisted enteroscopy N/A 1/26/2018    Performed by Jesse Davis MD at [facility] ENDO (2ND FLR)    REVISION-LEAD-ICD N/A 11/20/2017    Performed by Rubén Winchester MD at [facility] CATH LAB    TONSILLECTOMY      TRANSESOPHAGEAL ECHOCARDIOGRAM (GLENN) N/A 1/9/2018    Performed by [facility] Diagnostic Provider at [facility] CATH LAB       Review of patient's allergies indicates:   Allergen Reactions    Asa [aspirin] Other (See Comments)     Bleeding ulcer       Current Facility-Administered Medications   Medication    [START ON 10/10/2018] amiodarone tablet 600 mg    dronabinol capsule 5 mg    furosemide tablet 20 mg    hydrALAZINE injection 15 mg    HYDROcodone-acetaminophen 5-325 mg per tablet 1 tablet    [START ON 10/10/2018] magnesium oxide tablet 400 mg    mexiletine capsule 200 mg    mirtazapine tablet 7.5 mg    [START ON 10/10/2018] pantoprazole EC tablet 40 mg    polyethylene glycol packet 17 g    pravastatin tablet 20 mg    [START ON 10/10/2018] spironolactone tablet 25 mg     Family History     Problem Relation (Age of Onset)    Heart disease Mother, Father        Tobacco Use    Smoking status: Never Smoker    Smokeless tobacco: Never Used   Substance and Sexual Activity    Alcohol use: No    Drug use: No    Sexual activity: Not Currently     Review of Systems   Constitutional: Negative.    HENT: Negative.    Eyes: Negative.    Respiratory: Negative for shortness of breath.    Cardiovascular: Negative for chest pain, palpitations and leg swelling.   Gastrointestinal: Negative.    Endocrine: Negative.    Genitourinary: Negative.    Musculoskeletal: Negative.    Skin: Negative.    Allergic/Immunologic: Negative.    Neurological: Negative.    Hematological: Negative.    Psychiatric/Behavioral: Negative.      Objective:     Vital  Signs (Most Recent):  Temp: 98.7 °F (37.1 °C) (10/09/18 1857)  Pulse: 80 (10/09/18 1857)  Resp: 16 (10/09/18 1857)  BP: (!) 108/0 (10/09/18 1857)  SpO2: 99 % (10/09/18 1857) Vital Signs (24h Range):  Temp:  [98.7 °F (37.1 °C)] 98.7 °F (37.1 °C)  Pulse:  [79-80] 80  Resp:  [15-18] 16  SpO2:  [99 %-100 %] 99 %  BP: (107-121)/(0-87) 108/0     No data found.  There is no height or weight on file to calculate BMI.    No intake or output data in the 24 hours ending 10/09/18 1924    Physical Exam   Constitutional: He is oriented to person, place, and time. He appears well-developed and well-nourished.   HENT:   Head: Normocephalic and atraumatic.   Eyes: Right eye exhibits no discharge. Left eye exhibits no discharge.   Neck: No tracheal deviation present.   Cardiovascular: Exam reveals no gallop and no friction rub.   +hum of the VAD   Pulmonary/Chest: Effort normal and breath sounds normal.   Abdominal: Soft. Bowel sounds are normal.   Musculoskeletal: He exhibits no edema.   Neurological: He is alert and oriented to person, place, and time.   Skin: Skin is warm and dry.   Psychiatric: He has a normal mood and affect.       Significant Labs:  CBC:  Recent Labs   Lab  10/04/18   0820  10/09/18   0944  10/09/18   1332   WBC  5.89  5.08  6.17   RBC  5.05  4.44*  4.32*   HGB  12.5*  10.8*  10.7*   HCT  39.8*  34.8*  33.4*   PLT  150  198  196   MCV  79*  78*  77*   MCH  24.8*  24.3*  24.8*   MCHC  31.4*  31.0*  32.0     BNP:  Recent Labs   Lab  10/04/18   0820  10/08/18   1005  10/09/18   1332   BNP  645*  574*  428*     CMP:  Recent Labs   Lab  10/04/18   0820  10/08/18   1005  10/09/18   1332   GLU  86  88  79   CALCIUM  10.0  9.8  10.0   ALBUMIN   --   3.6  3.8   PROT   --   7.5  7.8   NA  136  137  139   K  5.5*  3.8  3.6   CO2  24  27  24   CL  100  101  103   BUN  20  22  24*   CREATININE  1.4  1.4  1.4   ALKPHOS   --   81  75   ALT   --   30  31   AST   --   34  31   BILITOT   --   0.5  0.5      Coagulation:   Recent  Labs   Lab  10/04/18   0820  10/08/18   1005  10/09/18   0944   INR  3.9*  6.6*  5.8*   APTT  41.8*   --    --      LDH:  No results for input(s): LDH in the last 72 hours.  Microbiology:  Microbiology Results (last 7 days)     ** No results found for the last 168 hours. **        Assessment/Plan:     LVAD (left ventricular assist device) present    - interrogation with low flow alarms  - BP control  - LVAD order set  - hold warfarin with supratherapeutic INR  - TTE with CFD  - maintain euvolemia  - trend Hgb (no bleeding sx currently but h/o of GIB)        Atrial tachycardia    - c/w amiodarone        VT (ventricular tachycardia)    - c/w amiodarone and mexilitene        Essential hypertension    - IV hydralazine prn  - c/w aldactone  - BP control to mitigate risk of low flow alarm        Nonischemic cardiomyopathy    - c/w PO Dagoberto Almonte MD  Heart Transplant  [facility]

## 2018-10-10 NOTE — ASSESSMENT & PLAN NOTE
Notable anemia on daily lab  HB drop  Results for MIRTA LOPEZ (MRN [id number]) as of 10/10/2018 11:22   Ref. Range 10/4/2018 08:20 10/8/2018 10:05 10/9/2018 09:44 10/9/2018 13:32 10/10/2018 02:27   Hemoglobin Latest Ref Range: 14.0 - 18.0 g/dL 12.5 (L)  10.8 (L) 10.7 (L) 9.0 (L)     Denies overt bleed.   Will consult GI to review. Fu on occult stool test

## 2018-10-10 NOTE — PROGRESS NOTES
[facility]  Heart Transplant  Progress Note    Patient Name: Suman Hayden  MRN: [id number]  Admission Date: 10/9/2018  Hospital Length of Stay: 1 days  Attending Physician: Graciela Bautista MD  Primary Care Provider: Joe Ernst MD  Principal Problem:<principal problem not specified>    Subjective:     Interval History:     Seen and examined at bedside. Had one low flow episode last midnight after rx with hydralazine. Resolved with fluid infusion (Bolus) and feeling well. Denies melena, BRBPR, BM, SOB or chest pain.     Continuous Infusions:  Scheduled Meds:   [START ON 10/11/2018] amiodarone  400 mg Oral Daily    dronabinol  5 mg Oral TID    furosemide  20 mg Oral BID    magnesium oxide  400 mg Oral Daily    mexiletine  200 mg Oral Q12H    mirtazapine  7.5 mg Oral Nightly    pantoprazole  40 mg Oral Daily    pravastatin  20 mg Oral QHS    spironolactone  25 mg Oral Daily     PRN Meds:HYDROcodone-acetaminophen, polyethylene glycol    Review of patient's allergies indicates:   Allergen Reactions    Asa [aspirin] Other (See Comments)     Bleeding ulcer     Objective:     Vital Signs (Most Recent):  Temp: 98.1 °F (36.7 °C) (10/10/18 0505)  Pulse: 71 (10/10/18 0600)  Resp: 16 (10/10/18 0505)  BP: (!) 72/0 (10/10/18 0527)  SpO2: 99 % (10/10/18 0505) Vital Signs (24h Range):  Temp:  [97.8 °F (36.6 °C)-98.7 °F (37.1 °C)] 98.1 °F (36.7 °C)  Pulse:  [71-83] 71  Resp:  [14-18] 16  SpO2:  [98 %-100 %] 99 %  BP: ()/(0-87) 72/0     Patient Vitals for the past 72 hrs (Last 3 readings):   Weight   10/10/18 0800 69.9 kg (154 lb 1.6 oz)   10/09/18 2034 70.1 kg (154 lb 8.7 oz)     Body mass index is 22.76 kg/m².      Intake/Output Summary (Last 24 hours) at 10/10/2018 1108  Last data filed at 10/10/2018 0500  Gross per 24 hour   Intake 740 ml   Output 600 ml   Net 140 ml       Hemodynamic Parameters:       Telemetry: No event noted on review    Physical Exam   Constitutional: He is oriented  to person, place, and time. He appears well-developed.   HENT:   Head: Normocephalic.   Neck: No JVD present.   Cardiovascular:   LVAD hum- smooth   Pulmonary/Chest: Effort normal and breath sounds normal.   Abdominal: Soft. Bowel sounds are normal. He exhibits no mass. There is no tenderness. There is no guarding.   Musculoskeletal: He exhibits no edema or tenderness.   Neurological: He is alert and oriented to person, place, and time.   Skin: Skin is warm and dry.   Nursing note and vitals reviewed.       Significant Labs:  CBC:  Recent Labs   Lab  10/09/18   0944  10/09/18   1332  10/10/18   0227   WBC  5.08  6.17  4.67   RBC  4.44*  4.32*  3.70*   HGB  10.8*  10.7*  9.0*   HCT  34.8*  33.4*  29.2*   PLT  198  196  168   MCV  78*  77*  79*   MCH  24.3*  24.8*  24.3*   MCHC  31.0*  32.0  30.8*     BNP:  Recent Labs   Lab  10/08/18   1005  10/09/18   1332  10/10/18   0227   BNP  574*  428*  441*     CMP:  Recent Labs   Lab  10/08/18   1005  10/09/18   1332  10/10/18   0227   GLU  88  79  75   CALCIUM  9.8  10.0  8.8   ALBUMIN  3.6  3.8  3.0*   PROT  7.5  7.8  6.1   NA  137  139  138   K  3.8  3.6  3.2*   CO2  27  24  22*   CL  101  103  106   BUN  22  24*  20   CREATININE  1.4  1.4  1.3   ALKPHOS  81  75  70   ALT  30  31  22   AST  34  31  25   BILITOT  0.5  0.5  0.4      Coagulation:   Recent Labs   Lab  10/04/18   0820  10/08/18   1005  10/09/18   0944  10/10/18   0227   INR  3.9*  6.6*  5.8*  4.1*   APTT  41.8*   --    --   36.7*     LDH:  Recent Labs   Lab  10/09/18   2019  10/10/18   0227   LDH  156  167     Microbiology:  Microbiology Results (last 7 days)     ** No results found for the last 168 hours. **          I have reviewed all pertinent labs within the past 24 hours.    Estimated Creatinine Clearance: 53.8 mL/min (based on SCr of 1.3 mg/dL).    Diagnostic Results:        Assessment and Plan:     The VAD coordinator on-call was paged by the [facility] communication center that the patient called 911 for a  ""red heart despite being connected to batteries or wall power". The VAD coordinator called spoke to the patient's wife. She reports he is feeling fine but is "having a red alarm that says to call hospital". His VAD numbers were 2.6 flow, speed 5200, 11.7 PI, and power 3.7. The LVAD pump running symbol was illuminated.  The patient and his wife have not been measuring BPs lately. His INR has been recently high > 5.0.  The VAD coordinator had his wife check the last 6 alarms since she kept reporting it was a "call hospital alarm". The last six alarms were checked, and it was noted her had low flow alarms. The VAD coordinator instructed the patient to allow EMS to assess the patient and transport if needed. The patient arrived in the ED around noon today for further assessment and was transferred to [facility].     LVAD (left ventricular assist device) present    HMIII- DT implanted on 7/2017  INR goal 2-3.  Low flow alarms on interrogation related to low volume  Gi bleed or dehydration  LVAD care per protocol  Continue holding warfarin with supratherapeutic INR  Anemia noted on labs. Will consult GI to evaluate since patient has hx of Ileal ulcer  interrogation noted an event on low flow at 0055 to 2.0 from set level of 3.2        Supratherapeutic INR    possibly related to high dose of amiodarone recently started  Decrease amiodarone dose to 400mg Daily  Hold coumadin until therapeutic.          GI bleed    Notable anemia on daily lab  HB drop  Results for MIRTA LOPEZ (MRN [id number]) as of 10/10/2018 11:22   Ref. Range 10/4/2018 08:20 10/8/2018 10:05 10/9/2018 09:44 10/9/2018 13:32 10/10/2018 02:27   Hemoglobin Latest Ref Range: 14.0 - 18.0 g/dL 12.5 (L)  10.8 (L) 10.7 (L) 9.0 (L)     Denies overt bleed.   Will consult GI to review. Fu on occult stool test        Atrial tachycardia    - c/w amiodarone        VT (ventricular tachycardia)    - c/w amiodarone 400mg daily and mexilitene  Patient was supposed to " get ablation with Dr. Rodriguez.  Will discuss with EP if this can be explored during this visit        Essential hypertension    - improved with NS bolus  - c/w aldactone  - BP control to mitigate risk of low flow alarm        Nonischemic cardiomyopathy    - c/w PO Dagoberto Negrete MD  Heart Transplant  [facility]

## 2018-10-10 NOTE — SUBJECTIVE & OBJECTIVE
Interval History:     Seen and examined at bedside. Had one low flow episode last midnight after rx with hydralazine. Resolved with fluid infusion (Bolus) and feeling well. Denies melena, BRBPR, BM, SOB or chest pain.     Continuous Infusions:  Scheduled Meds:   [START ON 10/11/2018] amiodarone  400 mg Oral Daily    dronabinol  5 mg Oral TID    furosemide  20 mg Oral BID    magnesium oxide  400 mg Oral Daily    mexiletine  200 mg Oral Q12H    mirtazapine  7.5 mg Oral Nightly    pantoprazole  40 mg Oral Daily    pravastatin  20 mg Oral QHS    spironolactone  25 mg Oral Daily     PRN Meds:HYDROcodone-acetaminophen, polyethylene glycol    Review of patient's allergies indicates:   Allergen Reactions    Asa [aspirin] Other (See Comments)     Bleeding ulcer     Objective:     Vital Signs (Most Recent):  Temp: 98.1 °F (36.7 °C) (10/10/18 0505)  Pulse: 71 (10/10/18 0600)  Resp: 16 (10/10/18 0505)  BP: (!) 72/0 (10/10/18 0527)  SpO2: 99 % (10/10/18 0505) Vital Signs (24h Range):  Temp:  [97.8 °F (36.6 °C)-98.7 °F (37.1 °C)] 98.1 °F (36.7 °C)  Pulse:  [71-83] 71  Resp:  [14-18] 16  SpO2:  [98 %-100 %] 99 %  BP: ()/(0-87) 72/0     Patient Vitals for the past 72 hrs (Last 3 readings):   Weight   10/10/18 0800 69.9 kg (154 lb 1.6 oz)   10/09/18 2034 70.1 kg (154 lb 8.7 oz)     Body mass index is 22.76 kg/m².      Intake/Output Summary (Last 24 hours) at 10/10/2018 1108  Last data filed at 10/10/2018 0500  Gross per 24 hour   Intake 740 ml   Output 600 ml   Net 140 ml       Hemodynamic Parameters:       Telemetry: No event noted on review    Physical Exam   Constitutional: He is oriented to person, place, and time. He appears well-developed.   HENT:   Head: Normocephalic.   Neck: No JVD present.   Cardiovascular:   LVAD hum- smooth   Pulmonary/Chest: Effort normal and breath sounds normal.   Abdominal: Soft. Bowel sounds are normal. He exhibits no mass. There is no tenderness. There is no guarding.    Musculoskeletal: He exhibits no edema or tenderness.   Neurological: He is alert and oriented to person, place, and time.   Skin: Skin is warm and dry.   Nursing note and vitals reviewed.       Significant Labs:  CBC:  Recent Labs   Lab  10/09/18   0944  10/09/18   1332  10/10/18   0227   WBC  5.08  6.17  4.67   RBC  4.44*  4.32*  3.70*   HGB  10.8*  10.7*  9.0*   HCT  34.8*  33.4*  29.2*   PLT  198  196  168   MCV  78*  77*  79*   MCH  24.3*  24.8*  24.3*   MCHC  31.0*  32.0  30.8*     BNP:  Recent Labs   Lab  10/08/18   1005  10/09/18   1332  10/10/18   0227   BNP  574*  428*  441*     CMP:  Recent Labs   Lab  10/08/18   1005  10/09/18   1332  10/10/18   0227   GLU  88  79  75   CALCIUM  9.8  10.0  8.8   ALBUMIN  3.6  3.8  3.0*   PROT  7.5  7.8  6.1   NA  137  139  138   K  3.8  3.6  3.2*   CO2  27  24  22*   CL  101  103  106   BUN  22  24*  20   CREATININE  1.4  1.4  1.3   ALKPHOS  81  75  70   ALT  30  31  22   AST  34  31  25   BILITOT  0.5  0.5  0.4      Coagulation:   Recent Labs   Lab  10/04/18   0820  10/08/18   1005  10/09/18   0944  10/10/18   0227   INR  3.9*  6.6*  5.8*  4.1*   APTT  41.8*   --    --   36.7*     LDH:  Recent Labs   Lab  10/09/18   2019  10/10/18   0227   LDH  156  167     Microbiology:  Microbiology Results (last 7 days)     ** No results found for the last 168 hours. **          I have reviewed all pertinent labs within the past 24 hours.    Estimated Creatinine Clearance: 53.8 mL/min (based on SCr of 1.3 mg/dL).    Diagnostic Results:

## 2018-10-10 NOTE — ASSESSMENT & PLAN NOTE
HMIII- DT implanted on 7/2017  INR goal 2-3.  Low flow alarms on interrogation related to low volume  Gi bleed or dehydration  LVAD care per protocol  Continue holding warfarin with supratherapeutic INR  Anemia noted on labs. Will consult GI to evaluate since patient has hx of Ileal ulcer  interrogation noted an event on low flow at 0055 to 2.0 from set level of 3.2

## 2018-10-11 NOTE — PROGRESS NOTES
[facility]  Heart Transplant  Progress Note    Patient Name: Suman Hayden  MRN: [id number]  Admission Date: 10/9/2018  Hospital Length of Stay: 2 days  Attending Physician: Graciela Bautista MD  Primary Care Provider: Joe Ernst MD  Principal Problem:<principal problem not specified>    Subjective:     Interval History:    No further low flow. Had BP spike with dopplers of 102 noted this am requiring hydralazine 5mg IV push. No BM so far and Hb trending up. INR still supratherapeutic. Denies palpitation or dizziness.     Continuous Infusions:  Scheduled Meds:   amiodarone  400 mg Oral Daily    dronabinol  5 mg Oral TID    furosemide  20 mg Oral BID    magnesium oxide  400 mg Oral Daily    mexiletine  200 mg Oral Q12H    mirtazapine  7.5 mg Oral Nightly    pantoprazole  40 mg Oral Daily    pravastatin  20 mg Oral QHS    spironolactone  25 mg Oral Daily     PRN Meds:HYDROcodone-acetaminophen, polyethylene glycol    Review of patient's allergies indicates:   Allergen Reactions    Asa [aspirin] Other (See Comments)     Bleeding ulcer     Objective:     Vital Signs (Most Recent):  Temp: 98 °F (36.7 °C) (10/11/18 0810)  Pulse: 80 (10/11/18 0719)  Resp: 18 (10/11/18 0810)  BP: (!) 86/0 (10/11/18 0815)  SpO2: 99 % (10/11/18 0416) Vital Signs (24h Range):  Temp:  [97.2 °F (36.2 °C)-98.7 °F (37.1 °C)] 98 °F (36.7 °C)  Pulse:  [0-80] 80  Resp:  [18-20] 18  SpO2:  [93 %-99 %] 99 %  BP: ()/(0-80) 86/0     Patient Vitals for the past 72 hrs (Last 3 readings):   Weight   10/11/18 0700 70.6 kg (155 lb 10.3 oz)   10/10/18 0800 69.9 kg (154 lb 1.6 oz)   10/09/18 2034 70.1 kg (154 lb 8.7 oz)     Body mass index is 22.98 kg/m².      Intake/Output Summary (Last 24 hours) at 10/11/2018 0922  Last data filed at 10/11/2018 0600  Gross per 24 hour   Intake 720 ml   Output 1250 ml   Net -530 ml       Hemodynamic Parameters:       Telemetry: no acute event noted    Physical Exam     Constitutional:  He is oriented to person, place, and time. He appears well-developed.   HENT:   Head: Normocephalic.   Neck: No JVD present.   Cardiovascular:   LVAD hum- smooth   Pulmonary/Chest: Effort normal and breath sounds normal.   Abdominal: Soft. Bowel sounds are normal. He exhibits no mass. There is no tenderness. There is no guarding.   Musculoskeletal: He exhibits no edema or tenderness.   Neurological: He is alert and oriented to person, place, and time.   Skin: Skin is warm and dry.   Nursing note and vitals reviewed.      Significant Labs:  CBC:  Recent Labs   Lab  10/09/18   1332  10/10/18   0227  10/11/18   0445   WBC  6.17  4.67  4.76   RBC  4.32*  3.70*  3.66*   HGB  10.7*  9.0*  9.2*   HCT  33.4*  29.2*  29.3*   PLT  196  168  186   MCV  77*  79*  80*   MCH  24.8*  24.3*  25.1*   MCHC  32.0  30.8*  31.4*     BNP:  Recent Labs   Lab  10/08/18   1005  10/09/18   1332  10/10/18   0227   BNP  574*  428*  441*     CMP:  Recent Labs   Lab  10/08/18   1005  10/09/18   1332  10/10/18   0227  10/11/18   0444   GLU  88  79  75  89   CALCIUM  9.8  10.0  8.8  9.1   ALBUMIN  3.6  3.8  3.0*   --    PROT  7.5  7.8  6.1   --    NA  137  139  138  135*   K  3.8  3.6  3.2*  4.2   CO2  27  24  22*  24   CL  101  103  106  104   BUN  22  24*  20  20   CREATININE  1.4  1.4  1.3  1.3   ALKPHOS  81  75  70   --    ALT  30  31  22   --    AST  34  31  25   --    BILITOT  0.5  0.5  0.4   --       Coagulation:   Recent Labs   Lab  10/09/18   0944  10/10/18   0227  10/11/18   0444   INR  5.8*  4.1*  4.0*   APTT   --   36.7*  37.3*     LDH:  Recent Labs   Lab  10/09/18   2019  10/10/18   0227  10/11/18   0444   LDH  156  167  160     Microbiology:  Microbiology Results (last 7 days)     ** No results found for the last 168 hours. **          I have reviewed all pertinent labs within the past 24 hours.    Estimated Creatinine Clearance: 54.3 mL/min (based on SCr of 1.3 mg/dL).    Diagnostic Results:      Assessment and Plan:     The VAD  "coordinator on-call was paged by the [facility] communication center that the patient called 911 for a "red heart despite being connected to batteries or wall power". The VAD coordinator called spoke to the patient's wife. She reports he is feeling fine but is "having a red alarm that says to call hospital". His VAD numbers were 2.6 flow, speed 5200, 11.7 PI, and power 3.7. The LVAD pump running symbol was illuminated.  The patient and his wife have not been measuring BPs lately. His INR has been recently high > 5.0.  The VAD coordinator had his wife check the last 6 alarms since she kept reporting it was a "call hospital alarm". The last six alarms were checked, and it was noted her had low flow alarms. The VAD coordinator instructed the patient to allow EMS to assess the patient and transport if needed. The patient arrived in the ED around noon today for further assessment and was transferred to [facility].     LVAD (left ventricular assist device) present    HMIII- DT implanted on 7/2017  INR goal 2-3.  Low flow alarms resolved  Gi bleed or dehydration  LVAD care per protocol  Continue holding warfarin with supratherapeutic INR  Anemia noted on labs. GI has no plan for Invasive imaging currently  Will start Lisinopril low dose          Supratherapeutic INR    possibly related to high dose of amiodarone recently started  Decrease amiodarone dose to 400mg Daily  Continue holding coumadin until therapeutic range          GI bleed    Notable anemia on daily lab  HB drop  Results for MIRTA LOPEZ (MRN [id number]) as of 10/10/2018 11:22   Ref. Range 10/4/2018 08:20 10/8/2018 10:05 10/9/2018 09:44 10/9/2018 13:32 10/10/2018 02:27   Hemoglobin Latest Ref Range: 14.0 - 18.0 g/dL 12.5 (L)  10.8 (L) 10.7 (L) 9.0 (L)     Denies overt bleed.   Will consult GI to review. Fu on occult stool test        Atrial tachycardia    - c/w amiodarone        VT (ventricular tachycardia)    - c/w amiodarone 400mg daily and " mexilitene  Patient was supposed to get ablation with Dr. Rodriguez.  Will discuss with EP if this can be explored during this visit        Essential hypertension    - will consider low dose lisinopril. Continue hydralazine PRN  - c/w aldactone  - BP control to mitigate risk of low flow alarm        Nonischemic cardiomyopathy    - c/w PO Armandoix            Baljinder Negrete MD  Heart Transplant  [facility]

## 2018-10-11 NOTE — NURSING
Amiodarone administration    Spoke w/ Dr Almonte on call w/ [facility] to informed him pt only received 200mg of amiodarone po this am not 600mg - pt need a total of 400mg daily- orders given to give 200mg tonight.  Informed oncoming nurse

## 2018-10-11 NOTE — ASSESSMENT & PLAN NOTE
- will consider low dose lisinopril. Continue hydralazine PRN  - c/w aldactone  - BP control to mitigate risk of low flow alarm

## 2018-10-11 NOTE — PLAN OF CARE
Problem: Patient Care Overview  Goal: Plan of Care Review  Outcome: Ongoing (interventions implemented as appropriate)  POC reviewed with pt. VS and VAD numbers stable. Pt received 5mg hydralazine for elevated DP; goal MAP and DP achieved after administration. GI consulted for history of GIB and H/H 9.0/29.2. Occult blood stool sample ordered. Repeat 2D echo performed 10/10; EF 8%. Diuresis with aldactone daily and lasix 20mg PO BID. INR 4.1. No VAD alarms since 10/10 on interrogation. LVAD dressing due to be changed 10/11; wife performs. Pt remains free from falls, trauma, injury; pt tolerating POC well. Will continue to monitor.

## 2018-10-11 NOTE — PROGRESS NOTES
10/11/18 1709   Vital Signs   Temp 98.5 °F (36.9 °C)   Temp src Oral   Pulse 80   Heart Rate Source Monitor   Resp (!) 22   O2 Device (Oxygen Therapy) room air   BP (!) 100/0  (doppler)   BP Location Left arm   BP Method Doppler   Patient Position Lying   notified dr guardado with above findings orders given to give lisinopril then repeat doppler in 30 minutes.

## 2018-10-11 NOTE — PROGRESS NOTES
10/10/18 1913   Device   Type HeartMate3   Flow 3.9   Speed 5200   PI 7.1   Power ([place]) 3.6   doppler was 108- notified dr zazueta- orders to give 5mg of hydralazine now. Awaiting for verification- medication given- informed oncoming nurse

## 2018-10-11 NOTE — SUBJECTIVE & OBJECTIVE
Interval History:    No further low flow. Labile BP with dopplers of 102 noted this am requiring hydralazine 5mg IV push. No BM so far and Hb trending up. INR still supratherapeutic. Denies palpitation or dizziness.     Continuous Infusions:  Scheduled Meds:   amiodarone  400 mg Oral Daily    dronabinol  5 mg Oral TID    furosemide  20 mg Oral BID    magnesium oxide  400 mg Oral Daily    mexiletine  200 mg Oral Q12H    mirtazapine  7.5 mg Oral Nightly    pantoprazole  40 mg Oral Daily    pravastatin  20 mg Oral QHS    spironolactone  25 mg Oral Daily     PRN Meds:HYDROcodone-acetaminophen, polyethylene glycol    Review of patient's allergies indicates:   Allergen Reactions    Asa [aspirin] Other (See Comments)     Bleeding ulcer     Objective:     Vital Signs (Most Recent):  Temp: 98 °F (36.7 °C) (10/11/18 0810)  Pulse: 80 (10/11/18 0719)  Resp: 18 (10/11/18 0810)  BP: (!) 86/0 (10/11/18 0815)  SpO2: 99 % (10/11/18 0416) Vital Signs (24h Range):  Temp:  [97.2 °F (36.2 °C)-98.7 °F (37.1 °C)] 98 °F (36.7 °C)  Pulse:  [0-80] 80  Resp:  [18-20] 18  SpO2:  [93 %-99 %] 99 %  BP: ()/(0-80) 86/0     Patient Vitals for the past 72 hrs (Last 3 readings):   Weight   10/11/18 0700 70.6 kg (155 lb 10.3 oz)   10/10/18 0800 69.9 kg (154 lb 1.6 oz)   10/09/18 2034 70.1 kg (154 lb 8.7 oz)     Body mass index is 22.98 kg/m².      Intake/Output Summary (Last 24 hours) at 10/11/2018 0922  Last data filed at 10/11/2018 0600  Gross per 24 hour   Intake 720 ml   Output 1250 ml   Net -530 ml       Hemodynamic Parameters:       Telemetry: no acute event noted    Physical Exam     Constitutional: He is oriented to person, place, and time. He appears well-developed.   HENT:   Head: Normocephalic.   Neck: No JVD present.   Cardiovascular:   LVAD hum- smooth   Pulmonary/Chest: Effort normal and breath sounds normal.   Abdominal: Soft. Bowel sounds are normal. He exhibits no mass. There is no tenderness. There is no guarding.    Musculoskeletal: He exhibits no edema or tenderness.   Neurological: He is alert and oriented to person, place, and time.   Skin: Skin is warm and dry.   Nursing note and vitals reviewed.      Significant Labs:  CBC:  Recent Labs   Lab  10/09/18   1332  10/10/18   0227  10/11/18   0445   WBC  6.17  4.67  4.76   RBC  4.32*  3.70*  3.66*   HGB  10.7*  9.0*  9.2*   HCT  33.4*  29.2*  29.3*   PLT  196  168  186   MCV  77*  79*  80*   MCH  24.8*  24.3*  25.1*   MCHC  32.0  30.8*  31.4*     BNP:  Recent Labs   Lab  10/08/18   1005  10/09/18   1332  10/10/18   0227   BNP  574*  428*  441*     CMP:  Recent Labs   Lab  10/08/18   1005  10/09/18   1332  10/10/18   0227  10/11/18   0444   GLU  88  79  75  89   CALCIUM  9.8  10.0  8.8  9.1   ALBUMIN  3.6  3.8  3.0*   --    PROT  7.5  7.8  6.1   --    NA  137  139  138  135*   K  3.8  3.6  3.2*  4.2   CO2  27  24  22*  24   CL  101  103  106  104   BUN  22  24*  20  20   CREATININE  1.4  1.4  1.3  1.3   ALKPHOS  81  75  70   --    ALT  30  31  22   --    AST  34  31  25   --    BILITOT  0.5  0.5  0.4   --       Coagulation:   Recent Labs   Lab  10/09/18   0944  10/10/18   0227  10/11/18   0444   INR  5.8*  4.1*  4.0*   APTT   --   36.7*  37.3*     LDH:  Recent Labs   Lab  10/09/18   2019  10/10/18   0227  10/11/18   0444   LDH  156  167  160     Microbiology:  Microbiology Results (last 7 days)     ** No results found for the last 168 hours. **          I have reviewed all pertinent labs within the past 24 hours.    Estimated Creatinine Clearance: 54.3 mL/min (based on SCr of 1.3 mg/dL).    Diagnostic Results:

## 2018-10-11 NOTE — PROGRESS NOTES
10/11/18 1802   Vital Signs   Pulse (!) 0   Resp (!) 0   BP (!) 88/0   BP Location Left arm   BP Method Doppler   Patient Position Lying   notified dr guardado with above findings ordered to recheck doppler in 60minutes (1850).

## 2018-10-11 NOTE — PROGRESS NOTES
10/10/18 2013   Vital Signs   Temp 98.7 °F (37.1 °C)   Temp src Oral   Pulse 80   Heart Rate Source Monitor   Resp 18   SpO2 98 %   Pulse Oximetry Type Intermittent   O2 Device (Oxygen Therapy) room air   BP (!) 86/0   BP Location Left arm   BP Method Doppler   Patient Position Sitting     Notified Dr. Almonte of f/u BP post-hydralazine administration. No further orders. No complaints from patient. Will monitor.

## 2018-10-11 NOTE — ASSESSMENT & PLAN NOTE
HMIII- DT implanted on 7/2017  INR goal 2-3.  Low flow alarms resolved  Gi bleed or dehydration  LVAD care per protocol  Continue holding warfarin with supratherapeutic INR  Anemia noted on labs. GI has no plan for Invasive imaging currently  Will start Lisinopril low dose

## 2018-10-11 NOTE — ASSESSMENT & PLAN NOTE
possibly related to high dose of amiodarone recently started  Decrease amiodarone dose to 400mg Daily  Continue holding coumadin until therapeutic range

## 2018-10-12 PROBLEM — Z95.811 LVAD (LEFT VENTRICULAR ASSIST DEVICE) PRESENT: Status: RESOLVED | Noted: 2017-07-29 | Resolved: 2018-01-01

## 2018-10-12 NOTE — ASSESSMENT & PLAN NOTE
possibly related to high dose of amiodarone recently started  Decrease amiodarone dose to 400mg Daily  Restart coumadin 1mg daily per PharmD

## 2018-10-12 NOTE — SUBJECTIVE & OBJECTIVE
Interval History:    No event overnight. Stool occult positive but no overt bleed. H/H trending up. BP also trending up with dopplers of 100/0 at one time. INR at therapeutic level today    Continuous Infusions:  Scheduled Meds:   amiodarone  400 mg Oral Daily    dronabinol  5 mg Oral TID    furosemide  20 mg Oral BID    lisinopril  2.5 mg Oral Daily    magnesium oxide  400 mg Oral Daily    mexiletine  200 mg Oral Q12H    mirtazapine  7.5 mg Oral Nightly    pantoprazole  40 mg Oral Daily    pravastatin  20 mg Oral QHS    spironolactone  25 mg Oral Daily    warfarin  1.5 mg Oral Once    [START ON 10/13/2018] warfarin  1 mg Oral Daily     PRN Meds:HYDROcodone-acetaminophen, polyethylene glycol    Review of patient's allergies indicates:   Allergen Reactions    Asa [aspirin] Other (See Comments)     Bleeding ulcer     Objective:     Vital Signs (Most Recent):  Temp: 97.8 °F (36.6 °C) (10/12/18 1100)  Pulse: 80 (10/12/18 1100)  Resp: 18 (10/12/18 1100)  BP: (!) (P) 92/0 (10/12/18 1230)  SpO2: 97 % (10/12/18 1100) Vital Signs (24h Range):  Temp:  [97.8 °F (36.6 °C)-98.7 °F (37.1 °C)] 97.8 °F (36.6 °C)  Pulse:  [0-81] 80  Resp:  [0-22] 18  SpO2:  [97 %-99 %] 97 %  BP: ()/(0-56) (P) 92/0     Patient Vitals for the past 72 hrs (Last 3 readings):   Weight   10/12/18 0700 69.5 kg (153 lb 3.5 oz)   10/11/18 0700 70.6 kg (155 lb 10.3 oz)   10/10/18 0800 69.9 kg (154 lb 1.6 oz)     Body mass index is 22.63 kg/m².      Intake/Output Summary (Last 24 hours) at 10/12/2018 1246  Last data filed at 10/12/2018 0600  Gross per 24 hour   Intake 900 ml   Output 1650 ml   Net -750 ml       Hemodynamic Parameters:       Telemetry: no event noted    Physical Exam     Constitutional: He is oriented to person, place, and time. He appears well-developed.   HENT:   Head: Normocephalic.   Neck: No JVD present.   Cardiovascular:   LVAD hum- smooth   Pulmonary/Chest: Effort normal and breath sounds normal.    Abdominal: Soft. Bowel sounds are normal. He exhibits no mass. There is no tenderness. There is no guarding.   Musculoskeletal: He exhibits no edema or tenderness.   Neurological: He is alert and oriented to person, place, and time.   Skin: Skin is warm and dry.   Nursing note and vitals reviewed.     Significant Labs:  CBC:  Recent Labs   Lab  10/10/18   0227  10/11/18   0445  10/12/18   0644   WBC  4.67  4.76  5.13   RBC  3.70*  3.66*  3.86*   HGB  9.0*  9.2*  9.6*   HCT  29.2*  29.3*  31.0*   PLT  168  186  196   MCV  79*  80*  80*   MCH  24.3*  25.1*  24.9*   MCHC  30.8*  31.4*  31.0*     BNP:  Recent Labs   Lab  10/09/18   1332  10/10/18   0227  10/12/18   0644   BNP  428*  441*  328*     CMP:  Recent Labs   Lab  10/09/18   1332  10/10/18   0227  10/11/18   0444  10/12/18   0644   GLU  79  75  89  79   CALCIUM  10.0  8.8  9.1  9.4   ALBUMIN  3.8  3.0*   --   3.3*   PROT  7.8  6.1   --   6.8   NA  139  138  135*  136   K  3.6  3.2*  4.2  4.2   CO2  24  22*  24  28   CL  103  106  104  102   BUN  24*  20  20  18   CREATININE  1.4  1.3  1.3  1.3   ALKPHOS  75  70   --   75   ALT  31  22   --   25   AST  31  25   --   28   BILITOT  0.5  0.4   --   0.3      Coagulation:   Recent Labs   Lab  10/10/18   0227  10/11/18   0444  10/12/18   0644   INR  4.1*  4.0*  3.0*   APTT  36.7*  37.3*  33.3*     LDH:  Recent Labs   Lab  10/09/18   2019  10/10/18   0227  10/11/18   0444  10/12/18   0644   LDH  156  167  160  163     Microbiology:  Microbiology Results (last 7 days)     ** No results found for the last 168 hours. **          I have reviewed all pertinent labs within the past 24 hours.    Estimated Creatinine Clearance: 53.5 mL/min (based on SCr of 1.3 mg/dL).    Diagnostic Results:

## 2018-10-12 NOTE — PLAN OF CARE
Problem: Patient Care Overview  Goal: Plan of Care Review  Outcome: Ongoing (interventions implemented as appropriate)  Updated care plan w/ pt.  Address all needs throughout shift. No issues w/ LVAD during shift No falls during shift.  Monitor H&H- last 9.2/29.3- GI consulted, stool sent for occult blood and resulted in being positive. Will continue to monitor.

## 2018-10-12 NOTE — PROGRESS NOTES
Called to assess patient who had become less engaging clammy and feeling dizzy while seated. Wife mention him reporting a feeling of nausea and near vomiting. He also had a feeling of a mass in the throat. Initial BP take was not successful and patient was laid in bed. Second attempt showed doppler 84/0. There was noted Low flow alarm at 4.25pm with drop to 2.0 which spontaneously resolved with supine position. Patient at bedside was clammy but mentioned feeling nausea. I reviewed his telemetry that showed rate of between 70-80. Pacemaker mode had been turned and could not rely on finding. Reported to have had normal stool. He is back to baseline and AAO x3  Reviewed Labs done earlier as well    A/P   Will order ICD programming. Patient has know hx of VT and was to have ablation with Dr. Rodriguez, held due to high INR.  Will check Hb level due to recent anemia  Continue to observe

## 2018-10-12 NOTE — ASSESSMENT & PLAN NOTE
Notable anemia on daily lab  HB drop  Results for MIRTA LOPEZ (MRN [id number]) as of 10/10/2018 11:22   Ref. Range 10/4/2018 08:20 10/8/2018 10:05 10/9/2018 09:44 10/9/2018 13:32 10/10/2018 02:27   Hemoglobin Latest Ref Range: 14.0 - 18.0 g/dL 12.5 (L)  10.8 (L) 10.7 (L) 9.0 (L)   hemoccult Positive  HB going up and no intent to do invasive camilo

## 2018-10-12 NOTE — PROGRESS NOTES
[facility]  Heart Transplant  Progress Note    Patient Name: Suman Hayden  MRN: [id number]  Admission Date: 10/9/2018  Hospital Length of Stay: 3 days  Attending Physician: Graciela Bautista MD  Primary Care Provider: Joe Ernst MD  Principal Problem:<principal problem not specified>    Subjective:     Interval History:    No event overnight. Stool occult positive but no overt bleed. H/H trending up. BP also trending up with dopplers of 100/0 at one time. INR at therapeutic level today    Continuous Infusions:  Scheduled Meds:   amiodarone  400 mg Oral Daily    dronabinol  5 mg Oral TID    furosemide  20 mg Oral BID    lisinopril  2.5 mg Oral Daily    magnesium oxide  400 mg Oral Daily    mexiletine  200 mg Oral Q12H    mirtazapine  7.5 mg Oral Nightly    pantoprazole  40 mg Oral Daily    pravastatin  20 mg Oral QHS    spironolactone  25 mg Oral Daily    warfarin  1.5 mg Oral Once    [START ON 10/13/2018] warfarin  1 mg Oral Daily     PRN Meds:HYDROcodone-acetaminophen, polyethylene glycol    Review of patient's allergies indicates:   Allergen Reactions    Asa [aspirin] Other (See Comments)     Bleeding ulcer     Objective:     Vital Signs (Most Recent):  Temp: 97.8 °F (36.6 °C) (10/12/18 1100)  Pulse: 80 (10/12/18 1100)  Resp: 18 (10/12/18 1100)  BP: (!) (P) 92/0 (10/12/18 1230)  SpO2: 97 % (10/12/18 1100) Vital Signs (24h Range):  Temp:  [97.8 °F (36.6 °C)-98.7 °F (37.1 °C)] 97.8 °F (36.6 °C)  Pulse:  [0-81] 80  Resp:  [0-22] 18  SpO2:  [97 %-99 %] 97 %  BP: ()/(0-56) (P) 92/0     Patient Vitals for the past 72 hrs (Last 3 readings):   Weight   10/12/18 0700 69.5 kg (153 lb 3.5 oz)   10/11/18 0700 70.6 kg (155 lb 10.3 oz)   10/10/18 0800 69.9 kg (154 lb 1.6 oz)     Body mass index is 22.63 kg/m².      Intake/Output Summary (Last 24 hours) at 10/12/2018 1246  Last data filed at 10/12/2018 0600  Gross per 24 hour   Intake 900 ml   Output 1650 ml   Net -750 ml        Hemodynamic Parameters:       Telemetry: no event noted    Physical Exam     Constitutional: He is oriented to person, place, and time. He appears well-developed.   HENT:   Head: Normocephalic.   Neck: No JVD present.   Cardiovascular:   LVAD hum- smooth   Pulmonary/Chest: Effort normal and breath sounds normal.   Abdominal: Soft. Bowel sounds are normal. He exhibits no mass. There is no tenderness. There is no guarding.   Musculoskeletal: He exhibits no edema or tenderness.   Neurological: He is alert and oriented to person, place, and time.   Skin: Skin is warm and dry.   Nursing note and vitals reviewed.     Significant Labs:  CBC:  Recent Labs   Lab  10/10/18   0227  10/11/18   0445  10/12/18   0644   WBC  4.67  4.76  5.13   RBC  3.70*  3.66*  3.86*   HGB  9.0*  9.2*  9.6*   HCT  29.2*  29.3*  31.0*   PLT  168  186  196   MCV  79*  80*  80*   MCH  24.3*  25.1*  24.9*   MCHC  30.8*  31.4*  31.0*     BNP:  Recent Labs   Lab  10/09/18   1332  10/10/18   0227  10/12/18   0644   BNP  428*  441*  328*     CMP:  Recent Labs   Lab  10/09/18   1332  10/10/18   0227  10/11/18   0444  10/12/18   0644   GLU  79  75  89  79   CALCIUM  10.0  8.8  9.1  9.4   ALBUMIN  3.8  3.0*   --   3.3*   PROT  7.8  6.1   --   6.8   NA  139  138  135*  136   K  3.6  3.2*  4.2  4.2   CO2  24  22*  24  28   CL  103  106  104  102   BUN  24*  20  20  18   CREATININE  1.4  1.3  1.3  1.3   ALKPHOS  75  70   --   75   ALT  31  22   --   25   AST  31  25   --   28   BILITOT  0.5  0.4   --   0.3      Coagulation:   Recent Labs   Lab  10/10/18   0227  10/11/18   0444  10/12/18   0644   INR  4.1*  4.0*  3.0*   APTT  36.7*  37.3*  33.3*     LDH:  Recent Labs   Lab  10/09/18   2019  10/10/18   0227  10/11/18   0444  10/12/18   0644   LDH  156  167  160  163     Microbiology:  Microbiology Results (last 7 days)     ** No results found for the last 168 hours. **          I have reviewed all pertinent labs within the past 24 hours.    Estimated  "Creatinine Clearance: 53.5 mL/min (based on SCr of 1.3 mg/dL).    Diagnostic Results:        Assessment and Plan:     The VAD coordinator on-call was paged by the [facility] communication center that the patient called 911 for a "red heart despite being connected to batteries or wall power". The VAD coordinator called spoke to the patient's wife. She reports he is feeling fine but is "having a red alarm that says to call hospital". His VAD numbers were 2.6 flow, speed 5200, 11.7 PI, and power 3.7. The LVAD pump running symbol was illuminated.  The patient and his wife have not been measuring BPs lately. His INR has been recently high > 5.0.  The VAD coordinator had his wife check the last 6 alarms since she kept reporting it was a "call hospital alarm". The last six alarms were checked, and it was noted her had low flow alarms. The VAD coordinator instructed the patient to allow EMS to assess the patient and transport if needed. The patient arrived in the ED around noon today for further assessment and was transferred to [facility].     LVAD (left ventricular assist device) present    HMIII- DT implanted on 7/2017  INR goal 2-3.  Low flow alarms resolved  No Gi bleed or dehydration  LVAD care per protocol  Restart home coumadin per pharm D 1mg daily  Anemia noted on labs. GI has no plan for Invasive imaging currently.  continue lisinopril 2.5mg daily.          VAD Interrogation:  TXP LVAD INTERROGATIONS 10/12/2018 10/12/2018 10/12/2018 10/12/2018 10/12/2018 10/12/2018 10/11/2018   Type HeartMate3 HeartMate3 HeartMate3 (No Data) HeartMate3 HeartMate3 HeartMate3   Flow 4.1 4.2 4.4 - 4.1 4.2 3.4   Speed 5200 5200 5200 - 5200 5200 5200   PI 4.0 3.4 3.0 - 4.2 3.2 4.0   Power ([place]) 3.6 3.6 3.6 - 3.5 3.5 3.6   LSL - - - - 4800 4800 4800   Pulsatility - - - - - - -   }                  Supratherapeutic INR    possibly related to high dose of amiodarone recently started  Decrease amiodarone dose to 400mg Daily  Restart coumadin " 1mg daily per PharmD          GI bleed    Notable anemia on daily lab  HB drop  Results for MIRTA LOPEZ (MRN [id number]) as of 10/10/2018 11:22   Ref. Range 10/4/2018 08:20 10/8/2018 10:05 10/9/2018 09:44 10/9/2018 13:32 10/10/2018 02:27   Hemoglobin Latest Ref Range: 14.0 - 18.0 g/dL 12.5 (L)  10.8 (L) 10.7 (L) 9.0 (L)   hemoccult Positive  HB going up and no intent to do invasive camilo        Atrial tachycardia    - c/w amiodarone        VT (ventricular tachycardia)    C/w amiodarone 400mg daily and mexilitene  Patient was supposed to get ablation with Dr. Rodriguez.  No plan for EP consult currently        Essential hypertension    - Continue lisinopril 2.5mg diaily. Will keep hydralazine PRN  - c/w aldactone  - BP control to mitigate risk of low flow alarm        Nonischemic cardiomyopathy    - c/w PO Lasix            Baljinder Negrete MD  Heart Transplant  [facility]

## 2018-10-12 NOTE — PROGRESS NOTES
10/11/18 1900   Vital Signs   BP (!) 88/0   BP Location Left arm   BP Method Doppler   Patient Position Lying   Per Silviano's request, notified Dr Almonte that Pt doppler at 88, no further instructions given, will continue to monitor.

## 2018-10-12 NOTE — ASSESSMENT & PLAN NOTE
- Continue lisinopril 2.5mg diaily. Will keep hydralazine PRN  - c/w aldactone  - BP control to mitigate risk of low flow alarm

## 2018-10-12 NOTE — PROGRESS NOTES
10/12/18 1628   Device   Type HeartMate3   Flow 2.0   Speed 5200   PI 3.3   Power ([place]) 7.4   wife called for help, pt was found in the chair- skin was clammy- doppler was 84/0  Blood sugar was 95.. Pt c/o vomit- no results-- pt was placed back in bed.. notified dr ortiz of above findings and lvad coordinator SMILEY Bolton.  Physician at bedside..    Awaiting orders. Pt place on bedside monitor-- spouse at bedside

## 2018-10-12 NOTE — PROGRESS NOTES
10/12/18 1230   Vital Signs   BP (!) 92/0   BP Location Left arm   BP Method Doppler   Patient Position Sitting   Notified dR Shore. Will continue to monitor.

## 2018-10-12 NOTE — ASSESSMENT & PLAN NOTE
HMIII- DT implanted on 7/2017  INR goal 2-3.  Low flow alarms resolved  No Gi bleed or dehydration  LVAD care per protocol  Restart home coumadin per pharm D 1mg daily  Anemia noted on labs. GI has no plan for Invasive imaging currently.  continue lisinopril 2.5mg daily.          VAD Interrogation:  TXP LVAD INTERROGATIONS 10/12/2018 10/12/2018 10/12/2018 10/12/2018 10/12/2018 10/12/2018 10/11/2018   Type HeartMate3 HeartMate3 HeartMate3 (No Data) HeartMate3 HeartMate3 HeartMate3   Flow 4.1 4.2 4.4 - 4.1 4.2 3.4   Speed 5200 5200 5200 - 5200 5200 5200   PI 4.0 3.4 3.0 - 4.2 3.2 4.0   Power ([place]) 3.6 3.6 3.6 - 3.5 3.5 3.6   LSL - - - - 4800 4800 4800   Pulsatility - - - - - - -   }

## 2018-10-12 NOTE — PLAN OF CARE
Problem: Patient Care Overview  Goal: Plan of Care Review  Outcome: Ongoing (interventions implemented as appropriate)  Updated care plan w/ pt.  Address all needs throughout shift. No issues w/ LVAD during shift No falls during shift.  Monitor H&H- last 9.2/29.3- GI consulted, stool sent for occult blood. Pt ambulated today x 2 w/ spouse.

## 2018-10-12 NOTE — ASSESSMENT & PLAN NOTE
C/w amiodarone 400mg daily and mexilitene  Patient was supposed to get ablation with Dr. Rodriguez.  No plan for EP consult currently

## 2018-10-12 NOTE — PROGRESS NOTES
"DISCHARGE    SW to pt's room today for possible weekend discharge.  Pt presents as lying in bed with wife Kia at bedside.  Pt and wife both present as aao x4, pleasant, calm, cooperative, and asking and answering questions appropriately.  Pt and wife verbalize understanding and agreement with plan for possible d/c home this weekend if INR remains therapeutic.  Pt's wife reports she will transport pt home at time of discharge.  Pt denies any d/c needs, and none identified by medical team.  Pt reports coping adequately at this time.  Pt reported to LVAD emma Kiran yesterday that he was experiencing some anxiety at sound of alarms after ICD shocks that led to this admission.  Pt reports today that he is feeling "better" in this regard.  Pt and wife deny any needs or concerns to SW.  SW providing ongoing psychosocial and counseling support, education, resources, assistance, and discharge planning as indicated.  SW remains available.  "

## 2018-10-13 NOTE — ASSESSMENT & PLAN NOTE
C/w amiodarone 400mg daily and mexilitene  Patient was supposed to get ablation with Dr. Rodriguez.  Had an episode of near syncope yesterday while seated without evidence of arrhythmia on the telemetry  Will interrogate device on Monday and ask EP if Mexitil causing SE

## 2018-10-13 NOTE — SUBJECTIVE & OBJECTIVE
Interval History:    No acute overnight events. PI event noted (1.9) on the LVAD without low flow. Fluid bolus 250mL given. Denies tarry stools, SOB, dizziness or palpitation.H/H trending up. Wife at bedside and mention this has been happening since patient was started on mexitil.    Continuous Infusions:  Scheduled Meds:   amiodarone  400 mg Oral Daily    dronabinol  5 mg Oral TID    furosemide  20 mg Oral BID    lisinopril  2.5 mg Oral Daily    magnesium oxide  400 mg Oral Daily    mexiletine  200 mg Oral Q12H    mirtazapine  7.5 mg Oral Nightly    pantoprazole  40 mg Oral Daily    pravastatin  20 mg Oral QHS    spironolactone  25 mg Oral Daily    warfarin  1 mg Oral Daily     PRN Meds:HYDROcodone-acetaminophen, polyethylene glycol    Review of patient's allergies indicates:   Allergen Reactions    Asa [aspirin] Other (See Comments)     Bleeding ulcer     Objective:     Vital Signs (Most Recent):  Temp: 98.3 °F (36.8 °C) (10/13/18 0812)  Pulse: 80 (10/13/18 0812)  Resp: 18 (10/13/18 0812)  BP: (!) 100/0 (10/13/18 0813)  SpO2: 98 % (10/13/18 0812) Vital Signs (24h Range):  Temp:  [97.5 °F (36.4 °C)-98.3 °F (36.8 °C)] 98.3 °F (36.8 °C)  Pulse:  [78-87] 80  Resp:  [16-18] 18  SpO2:  [96 %-98 %] 98 %  BP: ()/(0-79) 100/0     Patient Vitals for the past 72 hrs (Last 3 readings):   Weight   10/13/18 0700 69.2 kg (152 lb 8.9 oz)   10/12/18 0700 69.5 kg (153 lb 3.5 oz)   10/11/18 0700 70.6 kg (155 lb 10.3 oz)     Body mass index is 22.53 kg/m².      Intake/Output Summary (Last 24 hours) at 10/13/2018 1025  Last data filed at 10/13/2018 0600  Gross per 24 hour   Intake 1060 ml   Output 1250 ml   Net -190 ml       Hemodynamic Parameters:       Telemetry: no event noted    Physical Exam     Constitutional: He is oriented to person, place, and time. He appears well-developed.   HENT:   Head: Normocephalic.   Neck: No JVD present.   Cardiovascular:   LVAD hum- smooth   Pulmonary/Chest: Effort normal and  breath sounds normal.   Abdominal: Soft. Bowel sounds are normal. He exhibits no mass. There is no tenderness. There is no guarding.   Musculoskeletal: He exhibits no edema or tenderness.   Neurological: He is alert and oriented to person, place, and time.   Skin: Skin is warm and dry.   Nursing note and vitals reviewed.    Significant Labs:  CBC:  Recent Labs   Lab  10/11/18   0445  10/12/18   0644  10/12/18   1739  10/13/18   0535   WBC  4.76  5.13   --   5.13   RBC  3.66*  3.86*   --   3.84*   HGB  9.2*  9.6*  9.2*  9.7*   HCT  29.3*  31.0*   --   30.7*   PLT  186  196   --   206   MCV  80*  80*   --   80*   MCH  25.1*  24.9*   --   25.3*   MCHC  31.4*  31.0*   --   31.6*     BNP:  Recent Labs   Lab  10/09/18   1332  10/10/18   0227  10/12/18   0644   BNP  428*  441*  328*     CMP:  Recent Labs   Lab  10/09/18   1332  10/10/18   0227  10/11/18   0444  10/12/18   0644  10/13/18   0535   GLU  79  75  89  79  81   CALCIUM  10.0  8.8  9.1  9.4  9.0   ALBUMIN  3.8  3.0*   --   3.3*   --    PROT  7.8  6.1   --   6.8   --    NA  139  138  135*  136  133*   K  3.6  3.2*  4.2  4.2  4.1   CO2  24  22*  24  28  25   CL  103  106  104  102  101   BUN  24*  20  20  18  20   CREATININE  1.4  1.3  1.3  1.3  1.2   ALKPHOS  75  70   --   75   --    ALT  31  22   --   25   --    AST  31  25   --   28   --    BILITOT  0.5  0.4   --   0.3   --       Coagulation:   Recent Labs   Lab  10/11/18   0444  10/12/18   0644  10/13/18   0535   INR  4.0*  3.0*  2.5*   APTT  37.3*  33.3*  31.8     LDH:  Recent Labs   Lab  10/11/18   0444  10/12/18   0644  10/13/18   0535   LDH  160  163  177     Microbiology:  Microbiology Results (last 7 days)     ** No results found for the last 168 hours. **          I have reviewed all pertinent labs within the past 24 hours.    Estimated Creatinine Clearance: 57.7 mL/min (based on SCr of 1.2 mg/dL).    Diagnostic Results:

## 2018-10-13 NOTE — PROGRESS NOTES
[facility]  Heart Transplant  Progress Note    Patient Name: Suman Hayden  MRN: [id number]  Admission Date: 10/9/2018  Hospital Length of Stay: 4 days  Attending Physician: Graciela Bautista MD  Primary Care Provider: Joe Ernst MD  Principal Problem:<principal problem not specified>    Subjective:     Interval History:    No acute overnight events. PI event noted (1.9) on the LVAD without low flow. Fluid bolus 250mL given. Denies tarry stools, SOB, dizziness or palpitation.H/H trending up. Wife at bedside and mention this has been happening since patient was started on mexitil.    Continuous Infusions:  Scheduled Meds:   amiodarone  400 mg Oral Daily    dronabinol  5 mg Oral TID    furosemide  20 mg Oral BID    lisinopril  2.5 mg Oral Daily    magnesium oxide  400 mg Oral Daily    mexiletine  200 mg Oral Q12H    mirtazapine  7.5 mg Oral Nightly    pantoprazole  40 mg Oral Daily    pravastatin  20 mg Oral QHS    spironolactone  25 mg Oral Daily    warfarin  1 mg Oral Daily     PRN Meds:HYDROcodone-acetaminophen, polyethylene glycol    Review of patient's allergies indicates:   Allergen Reactions    Asa [aspirin] Other (See Comments)     Bleeding ulcer     Objective:     Vital Signs (Most Recent):  Temp: 98.3 °F (36.8 °C) (10/13/18 0812)  Pulse: 80 (10/13/18 0812)  Resp: 18 (10/13/18 0812)  BP: (!) 100/0 (10/13/18 0813)  SpO2: 98 % (10/13/18 0812) Vital Signs (24h Range):  Temp:  [97.5 °F (36.4 °C)-98.3 °F (36.8 °C)] 98.3 °F (36.8 °C)  Pulse:  [78-87] 80  Resp:  [16-18] 18  SpO2:  [96 %-98 %] 98 %  BP: ()/(0-79) 100/0     Patient Vitals for the past 72 hrs (Last 3 readings):   Weight   10/13/18 0700 69.2 kg (152 lb 8.9 oz)   10/12/18 0700 69.5 kg (153 lb 3.5 oz)   10/11/18 0700 70.6 kg (155 lb 10.3 oz)     Body mass index is 22.53 kg/m².      Intake/Output Summary (Last 24 hours) at 10/13/2018 1025  Last data filed at 10/13/2018 0600  Gross per 24 hour   Intake 1060 ml    Output 1250 ml   Net -190 ml       Hemodynamic Parameters:       Telemetry: no event noted    Physical Exam     Constitutional: He is oriented to person, place, and time. He appears well-developed.   HENT:   Head: Normocephalic.   Neck: No JVD present.   Cardiovascular:   LVAD hum- smooth   Pulmonary/Chest: Effort normal and breath sounds normal.   Abdominal: Soft. Bowel sounds are normal. He exhibits no mass. There is no tenderness. There is no guarding.   Musculoskeletal: He exhibits no edema or tenderness.   Neurological: He is alert and oriented to person, place, and time.   Skin: Skin is warm and dry.   Nursing note and vitals reviewed.    Significant Labs:  CBC:  Recent Labs   Lab  10/11/18   0445  10/12/18   0644  10/12/18   1739  10/13/18   0535   WBC  4.76  5.13   --   5.13   RBC  3.66*  3.86*   --   3.84*   HGB  9.2*  9.6*  9.2*  9.7*   HCT  29.3*  31.0*   --   30.7*   PLT  186  196   --   206   MCV  80*  80*   --   80*   MCH  25.1*  24.9*   --   25.3*   MCHC  31.4*  31.0*   --   31.6*     BNP:  Recent Labs   Lab  10/09/18   1332  10/10/18   0227  10/12/18   0644   BNP  428*  441*  328*     CMP:  Recent Labs   Lab  10/09/18   1332  10/10/18   0227  10/11/18   0444  10/12/18   0644  10/13/18   0535   GLU  79  75  89  79  81   CALCIUM  10.0  8.8  9.1  9.4  9.0   ALBUMIN  3.8  3.0*   --   3.3*   --    PROT  7.8  6.1   --   6.8   --    NA  139  138  135*  136  133*   K  3.6  3.2*  4.2  4.2  4.1   CO2  24  22*  24  28  25   CL  103  106  104  102  101   BUN  24*  20  20  18  20   CREATININE  1.4  1.3  1.3  1.3  1.2   ALKPHOS  75  70   --   75   --    ALT  31  22   --   25   --    AST  31  25   --   28   --    BILITOT  0.5  0.4   --   0.3   --       Coagulation:   Recent Labs   Lab  10/11/18   0444  10/12/18   0644  10/13/18   0535   INR  4.0*  3.0*  2.5*   APTT  37.3*  33.3*  31.8     LDH:  Recent Labs   Lab  10/11/18   0444  10/12/18   0644  10/13/18   0535   LDH  160  163  177  "    Microbiology:  Microbiology Results (last 7 days)     ** No results found for the last 168 hours. **          I have reviewed all pertinent labs within the past 24 hours.    Estimated Creatinine Clearance: 57.7 mL/min (based on SCr of 1.2 mg/dL).    Diagnostic Results:        Assessment and Plan:     The VAD coordinator on-call was paged by the [facility] communication center that the patient called 911 for a "red heart despite being connected to batteries or wall power". The VAD coordinator called spoke to the patient's wife. She reports he is feeling fine but is "having a red alarm that says to call hospital". His VAD numbers were 2.6 flow, speed 5200, 11.7 PI, and power 3.7. The LVAD pump running symbol was illuminated.  The patient and his wife have not been measuring BPs lately. His INR has been recently high > 5.0.  The VAD coordinator had his wife check the last 6 alarms since she kept reporting it was a "call hospital alarm". The last six alarms were checked, and it was noted her had low flow alarms. The VAD coordinator instructed the patient to allow EMS to assess the patient and transport if needed. The patient arrived in the ED around noon today for further assessment and was transferred to [facility].     LVAD (left ventricular assist device) present    HMIII- DT implanted on 7/2017  INR goal 2-3.  Low flow alarms resolved  No Gi bleed or dehydration  LVAD care per protocol  Coumadin1.5mg daily from to day. Home dose 2.5mg daily  GI has no plan for Invasive imaging currently.  continue lisinopril 2.5mg daily.      VAD Interrogation:  TXP LVAD INTERROGATIONS 10/13/2018 10/13/2018 10/13/2018 10/12/2018 10/12/2018 10/12/2018 10/12/2018   Type HeartMate3 HeartMate3 HeartMate3 HeartMate3 HeartMate3 HeartMate3 HeartMate3   Flow 3.5 3.8 3.7 4.1 3.7 2.0 4.1   Speed 5200 5200 5200 5200 5200 5200 5200   PI 6.9 4.8 2.2 5.6 5.6 3.3 4.0   Power ([place]) 3.7 3.5 3.6 3.6 3.6 7.4 3.6   LSL 4800 4800 4800 4800 - - - "   Pulsatility Intermittent pulse - - Intermittent pulse - - -   }                  Supratherapeutic INR    possibly related to high dose of amiodarone recently started  Decrease amiodarone dose to 400mg Daily  Restart coumadin 1mg daily per PharmD          GI bleed    Notable anemia on daily lab  HB drop  Results for MIRTA LOPEZ (MRN [id number]) as of 10/10/2018 11:22   Ref. Range 10/4/2018 08:20 10/8/2018 10:05 10/9/2018 09:44 10/9/2018 13:32 10/10/2018 02:27   Hemoglobin Latest Ref Range: 14.0 - 18.0 g/dL 12.5 (L)  10.8 (L) 10.7 (L) 9.0 (L)   hemoccult Positive  HB trending up. No plan of endoscope        Atrial tachycardia    - c/w amiodarone        VT (ventricular tachycardia)    C/w amiodarone 400mg daily and mexilitene  Patient was supposed to get ablation with Dr. Rodriguez.  Had an episode of near syncope yesterday while seated without evidence of arrhythmia on the telemetry  Will interrogate device on Monday and ask EP if Mexitil causing SE        Essential hypertension    - Change  lisinopril 2.5mg daily to BID  - continue aldactone  - Monitor BP closely due to labile course          Nonischemic cardiomyopathy    - c/w PO Lasix            Baljinder Negrete MD  Heart Transplant  [facility]

## 2018-10-13 NOTE — PROGRESS NOTES
Pt noted to have had several PI events of dropping to 1.9. Dr Almonte and VAD coordinator Lzi notified. BP and MAP unattainable. 250 ml/hr NS bolus ordered. No further orders given at this time. Will continue to monitor.

## 2018-10-13 NOTE — PLAN OF CARE
Problem: Patient Care Overview  Goal: Plan of Care Review  Outcome: Ongoing (interventions implemented as appropriate)  Pt is LATANYA PELAEZ OxNasima. Calm, cooperative. Free of falls, trauma, and injuries. Skin intact ex DLES. Pt educated on treatment plan. Pt demonstrates and verbalizes understanding. VSS. No bloody/tarry stools since 10/12. Labs monitored. H/H stable. Dressing change due today; done by wife. No alarms on LVAD. Coumadin restarted. Plan of care reviewed with pt.

## 2018-10-13 NOTE — ASSESSMENT & PLAN NOTE
Notable anemia on daily lab  HB drop  Results for MIRTA LOPEZ (MRN [id number]) as of 10/10/2018 11:22   Ref. Range 10/4/2018 08:20 10/8/2018 10:05 10/9/2018 09:44 10/9/2018 13:32 10/10/2018 02:27   Hemoglobin Latest Ref Range: 14.0 - 18.0 g/dL 12.5 (L)  10.8 (L) 10.7 (L) 9.0 (L)   hemoccult Positive  HB trending up. No plan of endoscope

## 2018-10-13 NOTE — PROGRESS NOTES
10/13/18 1735   Vital Signs   Pulse 79   Resp 18   SpO2 98 %   O2 Device (Oxygen Therapy) room air   BP (!) 84/0   BP Method Doppler   Pt began feeling nauseous and became very diaphoretic. Claimed he was feeling like he did yesterday when low flow alarm happened. Pt became unresponsive but did not lose consciousness. Rapid response called. Dr Negrete called. Vital signs taken as above. MD and Yasmin RN came to bedside and pt was already responding and able to verbalize he felt better. Labs ordered by Dr Negrete, EKG done, and PM interrogated. No alarms on LVAD. Will continue to monitor.

## 2018-10-13 NOTE — SIGNIFICANT EVENT
Rapid Response Nurse Note     Rapid Response Metrics:     Admit Date: 10/9/2018  LOS: 4  Code Status: Full Code   Date of Consult: 10/13/2018  : 1950  Age: 68 y.o.  Weight:   Wt Readings from Last 1 Encounters:   10/13/18 69.2 kg (152 lb 8.9 oz)     Race: AA  Sex: male  Bed: [place] [address]11734/[facility] A:   MRN: [id number]  Time Rapid Response Team page Received: 1734  Time Rapid Response Team at Bedside: 1738  Time Rapid Response Team left Bedside: 1800  Was the patient discharged from an ICU this admission? No   Was the patient discharged from a PACU within last 24 hours? No  Did the patient receive conscious sedation/general anesthesia within last 24 hours? No  Was the patient in the ED within the past 24 hours? No  Was the patient started on NIPPV within the past 24 hours? No  Did this progress into an ARC or CPA: No  Attending Physician: Graciela Bautista MD  Primary Service: Networked reference to record PCT   Consult Requested By: Graciela Bautista MD   Rapid Response Indication(s): Syncopal episode  Disposition: Remains on CTSU    SITUATION:     Reason for Call:   Called to evaluate the patient for Circulatory    BACKGROUND:     Why is the patient in the hospital?: Evaluation of LVAD  What changed?: Syncopal episode    ASSESSMENT:     What did you find: Upon arrival patient found to be sitting up in bedside chair slightly diaphoretic.  Wife reports that patient was starting to walk when he felt dizzy and had a blank stare.  Patient was able to sit back down in chair without issue.  BP 84 systolic (LVAD patient) O2 98% on RA RR 18.  States that he is starting to feel better and that he has had issues with this before.  Penelope PARIKH at bedside to assess.  Interpretation of LVAD needed and will take place Monday.  Will continue to monitor for any other episodes.     RECOMMENDATIONS:     We recommend: Follow up with rapid response team    FOLLOW-UP/CONTINGENCY PLAN:     Patient needs a second visit at :  N/A    Call the rapid response Nurse at x [phone] for additional questions or concerns.      PHYSICIAN ESCALATION:     Orders received and case discussed with Penelope PARIKH     Disposition: Remains on CTSU

## 2018-10-13 NOTE — PLAN OF CARE
Problem: Patient Care Overview  Goal: Plan of Care Review  Outcome: Ongoing (interventions implemented as appropriate)  Plan of care reviewed with pt. Pt and wife in agreement with plan. No CO pain or dizziness. Pt has not had any episodes like the one had during day shift. Noted on History that there were several PI events. MD notified and 250 Bolus NS given. VSS. Will continue to monitor.

## 2018-10-13 NOTE — ASSESSMENT & PLAN NOTE
HMIII- DT implanted on 7/2017  INR goal 2-3.  Low flow alarms resolved  No Gi bleed or dehydration  LVAD care per protocol  Continue [id number] daily  GI has no plan for Invasive imaging currently.  continue lisinopril 2.5mg daily.      VAD Interrogation:  TXP LVAD INTERROGATIONS 10/13/2018 10/13/2018 10/13/2018 10/12/2018 10/12/2018 10/12/2018 10/12/2018   Type HeartMate3 HeartMate3 HeartMate3 HeartMate3 HeartMate3 HeartMate3 HeartMate3   Flow 3.5 3.8 3.7 4.1 3.7 2.0 4.1   Speed 5200 5200 5200 5200 5200 5200 5200   PI 6.9 4.8 2.2 5.6 5.6 3.3 4.0   Power ([place]) 3.7 3.5 3.6 3.6 3.6 7.4 3.6   LSL 4800 4800 4800 4800 - - -   Pulsatility Intermittent pulse - - Intermittent pulse - - -   }

## 2018-10-14 NOTE — ASSESSMENT & PLAN NOTE
C/w amiodarone 400mg daily.  Patient was supposed to get ablation with Dr. Rodriguez.  Complaining of dizziness and blurry vision after taking mexiletine  Will D/C medication and contact EP to review and advise

## 2018-10-14 NOTE — PLAN OF CARE
Problem: Patient Care Overview  Goal: Plan of Care Review  Outcome: Ongoing (interventions implemented as appropriate)  No acute issues at this time. VSS. Ordered 500cc bolus over 5 hrs, chest x ray completed, Urinalysis resulted negative/clear, morning labs to be drawn in AM.  No more events at this time.  Patient c/o drowsiness secondary to day time episode. RR RN rounding through shift. Plan of care reviewed with pt and SO. In agreement with plan. Will continue to monitor.

## 2018-10-14 NOTE — CARE UPDATE
Rapid Response Follow-up Note    Followed up with patient for proactive rounding.   No acute issues at this time. Vital signs within normal limits  Reviewed plan of care with primary RN, Kely.  Patient receiving 500cc bolus over 5 hrs, chest x ray completed, morning labs to be drawn in AM.  No more events at this time.  Patient c/o drowsiness secondary to day time episode.  Please call Rapid Response RN with any questions or concerns at  [phone].

## 2018-10-14 NOTE — PLAN OF CARE
Problem: Patient Care Overview  Goal: Plan of Care Review  Outcome: Ongoing (interventions implemented as appropriate)  Pt is LATANYA PELAEZ, Ox4. Calm, cooperative. Free of falls, trauma, and injuries. Skin intact ex DLES. Pt educated on treatment plan. Pt demonstrates and verbalizes understanding. VSS. BP running low; see flowsheet. Lisinopril held. No more bloody/tarry stools. Mexitil DC'd. Plan of care reviewed with pt.

## 2018-10-14 NOTE — PROGRESS NOTES
10/14/18 0800   Vital Signs   Temp 97.7 °F (36.5 °C)   Temp src Oral   Pulse 81   Heart Rate Source Monitor   Resp 18   SpO2 95 %   O2 Device (Oxygen Therapy) room air   BP (!) 64/49   MAP (mmHg) 54   BP Location Left arm   BP Method Automatic   Patient Position Lying   Notified Dr Negrete of low BP. Holding Lisinopril. Will continue to monitor.

## 2018-10-14 NOTE — CHAPLAIN
had a conversation with patient and his wife.      Facts (Spiritual Perception of Illness): Patient is having medical problems but believes that God can resolve these problems.       Feelings (Emotions/Experiences/Coping): Patient is accepting of the situation because he believes that his problems will be resolved and has an active laura because he believes that God will resolve his problems.        Family/Friends: Patient's wife is providing a compassionate and supportive presence for patient.         Laura (Belief/Meaning/Philosophy): Patient believes that God resolves the problems of people who believe that God can do so.  Patient and his wife requested prayer to communicate to God that they believe and want God to resolve the patient's problems.   responded by providing prayer support.        Future (Spiritual Care Plan of Action): Patient and his wife want chaplains to continue to provide prayer support.

## 2018-10-14 NOTE — ASSESSMENT & PLAN NOTE
Notable anemia on daily lab  HB drop  Results for MIRTA LOPEZ (MRN [id number]) as of 10/10/2018 11:22   Ref. Range 10/4/2018 08:20 10/8/2018 10:05 10/9/2018 09:44 10/9/2018 13:32 10/10/2018 02:27   Hemoglobin Latest Ref Range: 14.0 - 18.0 g/dL 12.5 (L)  10.8 (L) 10.7 (L) 9.0 (L)   hemoccult Positive  HB trending up. No plan of endoscope

## 2018-10-14 NOTE — ASSESSMENT & PLAN NOTE
HMIII- DT implanted on 7/2017  INR goal 2-3.  Low flow alarms resolved  LVAD care per protocol  Continue coumadin 1.5mg today. Home dose is 2.5mg daily  GI has no plan for Invasive imaging currently.  Change lisinopril back to 2.5mg once daily due to hypotension this am      VAD Interrogation:  TXP LVAD INTERROGATIONS 10/14/2018 10/14/2018 10/14/2018 10/13/2018 10/13/2018 10/13/2018 10/13/2018   Type HeartMate3 HeartMate3 HeartMate3 HeartMate3 HeartMate3 HeartMate3 HeartMate3   Flow 3.7 3.7 4.0 4.1 3.8 3.5 3.5   Speed 5200 5200 5200 5200 5200 5200 5200   PI 3.5 4.1 2.0 2.4 5.0 7.0 6.9   Power ([place]) 4.5 4.0 3.5 3.6 3.5 3.6 3.7   LSL 4800 - - - - - 4800   Pulsatility Pulse - - - - - Intermittent pulse   }

## 2018-10-14 NOTE — PROGRESS NOTES
[facility]  Heart Transplant  Progress Note    Patient Name: Suman Hayden  MRN: [id number]  Admission Date: 10/9/2018  Hospital Length of Stay: 5 days  Attending Physician: Graciela Bautista MD  Primary Care Provider: Joe Ernst MD  Principal Problem:GI bleed    Subjective:     Interval History:     BP lower this morning (58/)). Report having visual changes and mild dizziness after taking mexitil this morning. Patient believes recent episode tied to medication. Denies chest pain, palpitation or melena.    Continuous Infusions:  Scheduled Meds:   amiodarone  400 mg Oral Daily    dronabinol  5 mg Oral TID    furosemide  20 mg Oral BID    [START ON 10/15/2018] lisinopril  2.5 mg Oral Daily    magnesium oxide  400 mg Oral Daily    mirtazapine  7.5 mg Oral Nightly    pantoprazole  40 mg Oral Daily    pravastatin  20 mg Oral QHS    spironolactone  25 mg Oral Daily    warfarin  2 mg Oral Daily     PRN Meds:HYDROcodone-acetaminophen, polyethylene glycol    Review of patient's allergies indicates:   Allergen Reactions    Asa [aspirin] Other (See Comments)     Bleeding ulcer     Objective:     Vital Signs (Most Recent):  Temp: 97.7 °F (36.5 °C) (10/14/18 0800)  Pulse: 81 (10/14/18 0800)  Resp: 18 (10/14/18 0800)  BP: (!) 68/0 (10/14/18 0806)  SpO2: 95 % (10/14/18 0800) Vital Signs (24h Range):  Temp:  [97.7 °F (36.5 °C)-98.8 °F (37.1 °C)] 97.7 °F (36.5 °C)  Pulse:  [68-86] 81  Resp:  [18] 18  SpO2:  [95 %-99 %] 95 %  BP: ()/(0-83) 68/0     Patient Vitals for the past 72 hrs (Last 3 readings):   Weight   10/14/18 0700 69.8 kg (153 lb 14.1 oz)   10/13/18 0700 69.2 kg (152 lb 8.9 oz)   10/12/18 0700 69.5 kg (153 lb 3.5 oz)     Body mass index is 22.72 kg/m².      Intake/Output Summary (Last 24 hours) at 10/14/2018 1025  Last data filed at 10/14/2018 0400  Gross per 24 hour   Intake 780 ml   Output 1000 ml   Net -220 ml       Hemodynamic Parameters:       Telemetry: no event  noted    Physical Exam     Constitutional: He is oriented to person, place, and time. He appears well-developed.   HEENT: normal visual acuity, EOMI and PERRLA  Head: Normocephalic.   Neck: No JVD present.   Cardiovascular:   LVAD hum- smooth   Pulmonary/Chest: Effort normal and breath sounds normal.   Abdominal: Soft. Bowel sounds are normal. He exhibits no mass. There is no tenderness. There is no guarding.   Musculoskeletal: He exhibits no edema or tenderness.   Neurological: He is alert and oriented to person, place, and time.   Skin: Skin is warm and dry.   Nursing note and vitals reviewed.    Significant Labs:  CBC:  Recent Labs   Lab  10/13/18   0535  10/13/18   1816  10/14/18   0427   WBC  5.13  7.41  5.63   RBC  3.84*  4.01*  3.91*   HGB  9.7*  9.9*  9.8*   HCT  30.7*  31.8*  31.3*   PLT  206  235  210   MCV  80*  79*  80*   MCH  25.3*  24.7*  25.1*   MCHC  31.6*  31.1*  31.3*     BNP:  Recent Labs   Lab  10/10/18   0227  10/12/18   0644  10/13/18   1816   BNP  441*  328*  224*     CMP:  Recent Labs   Lab  10/09/18   1332  10/10/18   0227   10/12/18   0644  10/13/18   0535  10/13/18   1816  10/14/18   0427   GLU  79  75   < >  79  81  97  79   CALCIUM  10.0  8.8   < >  9.4  9.0  9.4  9.2   ALBUMIN  3.8  3.0*   --   3.3*   --    --    --    PROT  7.8  6.1   --   6.8   --    --    --    NA  139  138   < >  136  133*  135*  134*   K  3.6  3.2*   < >  4.2  4.1  4.3  4.2   CO2  24  22*   < >  28  25  25  26   CL  103  106   < >  102  101  101  102   BUN  24*  20   < >  18  20  26*  25*   CREATININE  1.4  1.3   < >  1.3  1.2  1.6*  1.3   ALKPHOS  75  70   --   75   --    --    --    ALT  31  22   --   25   --    --    --    AST  31  25   --   28   --    --    --    BILITOT  0.5  0.4   --   0.3   --    --    --     < > = values in this interval not displayed.      Coagulation:   Recent Labs   Lab  10/12/18   0644  10/13/18   0535  10/14/18   0427   INR  3.0*  2.5*  2.3*   APTT  33.3*  31.8  30.0     LDH:  Recent  "Labs   Lab  10/12/18   0644  10/13/18   0535  10/14/18   0427   LDH  163  177  154     Microbiology:  Microbiology Results (last 7 days)     Procedure Component Value Units Date/Time    Blood culture [[id number]] Collected:  10/13/18 2007    Order Status:  Completed Specimen:  Blood Updated:  10/14/18 0345     Blood Culture, Routine No Growth to date          I have reviewed all pertinent labs within the past 24 hours.    Estimated Creatinine Clearance: 53.7 mL/min (based on SCr of 1.3 mg/dL).    Diagnostic Results:        Assessment and Plan:     The VAD coordinator on-call was paged by the [facility] communication center that the patient called 911 for a "red heart despite being connected to batteries or wall power". The VAD coordinator called spoke to the patient's wife. She reports he is feeling fine but is "having a red alarm that says to call hospital". His VAD numbers were 2.6 flow, speed 5200, 11.7 PI, and power 3.7. The LVAD pump running symbol was illuminated.  The patient and his wife have not been measuring BPs lately. His INR has been recently high > 5.0.  The VAD coordinator had his wife check the last 6 alarms since she kept reporting it was a "call hospital alarm". The last six alarms were checked, and it was noted her had low flow alarms. The VAD coordinator instructed the patient to allow EMS to assess the patient and transport if needed. The patient arrived in the ED around noon today for further assessment and was transferred to [facility].     * GI bleed    Notable anemia on daily lab  HB drop  Results for MIRTA LOPEZ (MRN [id number]) as of 10/10/2018 11:22   Ref. Range 10/4/2018 08:20 10/8/2018 10:05 10/9/2018 09:44 10/9/2018 13:32 10/10/2018 02:27   Hemoglobin Latest Ref Range: 14.0 - 18.0 g/dL 12.5 (L)  10.8 (L) 10.7 (L) 9.0 (L)   hemoccult Positive  HB trending up. No plan of endoscope        LVAD (left ventricular assist device) present    HMIII- DT implanted on 7/2017  INR goal 2-3.  Low " flow alarms resolved  LVAD care per protocol  Continue coumadin 1.5mg today. Home dose is 2.5mg daily  GI has no plan for Invasive imaging currently.  Change lisinopril back to 2.5mg once daily due to hypotension this am      VAD Interrogation:  TXP LVAD INTERROGATIONS 10/14/2018 10/14/2018 10/14/2018 10/13/2018 10/13/2018 10/13/2018 10/13/2018   Type HeartMate3 HeartMate3 HeartMate3 HeartMate3 HeartMate3 HeartMate3 HeartMate3   Flow 3.7 3.7 4.0 4.1 3.8 3.5 3.5   Speed 5200 5200 5200 5200 5200 5200 5200   PI 3.5 4.1 2.0 2.4 5.0 7.0 6.9   Power ([place]) 4.5 4.0 3.5 3.6 3.5 3.6 3.7   LSL 4800 - - - - - 4800   Pulsatility Pulse - - - - - Intermittent pulse   }                  Supratherapeutic INR    possibly related to high dose of amiodarone recently started  Decrease amiodarone dose to 400mg Daily  Restart coumadin 1mg daily per PharmD          Atrial tachycardia    - c/w amiodarone        VT (ventricular tachycardia)    C/w amiodarone 400mg daily.  Patient was supposed to get ablation with Dr. Rodriguez.  Complaining of dizziness and blurry vision after taking mexiletine  Will D/C mexiltine and contact EP to review and advise          Essential hypertension    - Continue lisinopril 2.5mg daily and aldactone  - Monitor BP closely due to labile course          Nonischemic cardiomyopathy    - c/w PO Lasix            Baljinder Negrete MD  Heart Transplant  [facility]

## 2018-10-14 NOTE — SUBJECTIVE & OBJECTIVE
Interval History:     BP lower this morning (58/)). Report having visual changes and mild dizziness after taking mexitil this morning. Patient believes recent episode tied to medication. Denies chest pain, palpitation or melena.    Continuous Infusions:  Scheduled Meds:   amiodarone  400 mg Oral Daily    dronabinol  5 mg Oral TID    furosemide  20 mg Oral BID    [START ON 10/15/2018] lisinopril  2.5 mg Oral Daily    magnesium oxide  400 mg Oral Daily    mirtazapine  7.5 mg Oral Nightly    pantoprazole  40 mg Oral Daily    pravastatin  20 mg Oral QHS    spironolactone  25 mg Oral Daily    warfarin  2 mg Oral Daily     PRN Meds:HYDROcodone-acetaminophen, polyethylene glycol    Review of patient's allergies indicates:   Allergen Reactions    Asa [aspirin] Other (See Comments)     Bleeding ulcer     Objective:     Vital Signs (Most Recent):  Temp: 97.7 °F (36.5 °C) (10/14/18 0800)  Pulse: 81 (10/14/18 0800)  Resp: 18 (10/14/18 0800)  BP: (!) 68/0 (10/14/18 0806)  SpO2: 95 % (10/14/18 0800) Vital Signs (24h Range):  Temp:  [97.7 °F (36.5 °C)-98.8 °F (37.1 °C)] 97.7 °F (36.5 °C)  Pulse:  [68-86] 81  Resp:  [18] 18  SpO2:  [95 %-99 %] 95 %  BP: ()/(0-83) 68/0     Patient Vitals for the past 72 hrs (Last 3 readings):   Weight   10/14/18 0700 69.8 kg (153 lb 14.1 oz)   10/13/18 0700 69.2 kg (152 lb 8.9 oz)   10/12/18 0700 69.5 kg (153 lb 3.5 oz)     Body mass index is 22.72 kg/m².      Intake/Output Summary (Last 24 hours) at 10/14/2018 1025  Last data filed at 10/14/2018 0400  Gross per 24 hour   Intake 780 ml   Output 1000 ml   Net -220 ml       Hemodynamic Parameters:       Telemetry: no event noted    Physical Exam       Constitutional: He is oriented to person, place, and time. He appears well-developed.   HEENT: normal visual acuity, EOMI and PERRLA  Head: Normocephalic.   Neck: No JVD present.   Cardiovascular:   LVAD hum- smooth   Pulmonary/Chest: Effort normal and breath sounds normal.    Abdominal: Soft. Bowel sounds are normal. He exhibits no mass. There is no tenderness. There is no guarding.   Musculoskeletal: He exhibits no edema or tenderness.   Neurological: He is alert and oriented to person, place, and time.   Skin: Skin is warm and dry.   Nursing note and vitals reviewed.    Significant Labs:  CBC:  Recent Labs   Lab  10/13/18   0535  10/13/18   1816  10/14/18   0427   WBC  5.13  7.41  5.63   RBC  3.84*  4.01*  3.91*   HGB  9.7*  9.9*  9.8*   HCT  30.7*  31.8*  31.3*   PLT  206  235  210   MCV  80*  79*  80*   MCH  25.3*  24.7*  25.1*   MCHC  31.6*  31.1*  31.3*     BNP:  Recent Labs   Lab  10/10/18   0227  10/12/18   0644  10/13/18   1816   BNP  441*  328*  224*     CMP:  Recent Labs   Lab  10/09/18   1332  10/10/18   0227   10/12/18   0644  10/13/18   0535  10/13/18   1816  10/14/18   0427   GLU  79  75   < >  79  81  97  79   CALCIUM  10.0  8.8   < >  9.4  9.0  9.4  9.2   ALBUMIN  3.8  3.0*   --   3.3*   --    --    --    PROT  7.8  6.1   --   6.8   --    --    --    NA  139  138   < >  136  133*  135*  134*   K  3.6  3.2*   < >  4.2  4.1  4.3  4.2   CO2  24  22*   < >  28  25  25  26   CL  103  106   < >  102  101  101  102   BUN  24*  20   < >  18  20  26*  25*   CREATININE  1.4  1.3   < >  1.3  1.2  1.6*  1.3   ALKPHOS  75  70   --   75   --    --    --    ALT  31  22   --   25   --    --    --    AST  31  25   --   28   --    --    --    BILITOT  0.5  0.4   --   0.3   --    --    --     < > = values in this interval not displayed.      Coagulation:   Recent Labs   Lab  10/12/18   0644  10/13/18   0535  10/14/18   0427   INR  3.0*  2.5*  2.3*   APTT  33.3*  31.8  30.0     LDH:  Recent Labs   Lab  10/12/18   0644  10/13/18   0535  10/14/18   0427   LDH  163  177  154     Microbiology:  Microbiology Results (last 7 days)     Procedure Component Value Units Date/Time    Blood culture [[id number]] Collected:  10/13/18 2007    Order Status:  Completed Specimen:  Blood Updated:  10/14/18  0345     Blood Culture, Routine No Growth to date          I have reviewed all pertinent labs within the past 24 hours.    Estimated Creatinine Clearance: 53.7 mL/min (based on SCr of 1.3 mg/dL).    Diagnostic Results:

## 2018-10-15 PROBLEM — D50.0 IRON DEFICIENCY ANEMIA DUE TO CHRONIC BLOOD LOSS: Status: ACTIVE | Noted: 2018-01-01

## 2018-10-15 NOTE — ASSESSMENT & PLAN NOTE
C/w amiodarone 400mg daily.  Patient was supposed to get ablation with Dr. Rodriguez.  Complaining of dizziness and blurry vision after taking mexiletine  Will D/C medication and contact EP today  -RRT 10/13, ICD interrogated - no acute events

## 2018-10-15 NOTE — HOSPITAL COURSE
Patient had multiple episodes of hypotension, dizziness, diaphoresis. Held mexiletine.    10/15: EP consult. Discontinued mexilitine. Compression stockings. Plan to monitor overnight and during ambulation.

## 2018-10-15 NOTE — ASSESSMENT & PLAN NOTE
68 AA man with NICM s/p HM3 LVAD implanted as destination therapy 7/27/17. He underwent ICD revision on 11/20/17 with Dr. Winchester (DC ICD placed with active RA/LV leads (RV lead capped during revision) that was complicated by pocket hematoma. He initially presented to the ED with weakness and an associated tachycardia that was found to be VT on telemetry. His device was used to pace him out of VT successfully. Burst 10 @ 91% R-R. He was discharged on mexiletine 200mg BID and had been tolerating it. However after initiation he had one episode described as vaso-vagal, then he had 2 more in the last 2 days.    Mexiletine has not been reported to cause these types of episodes, vaso-vagal reactions can happen from dehydration.    - will monitor for recurrence of VT off mexiletine  - consider compression stockings if episodes recur  - ICD interrogation to check for VT recurrence  - will sign off, please reconsult with any new issues

## 2018-10-15 NOTE — PLAN OF CARE
Problem: Patient Care Overview  Goal: Plan of Care Review  Outcome: Ongoing (interventions implemented as appropriate)  Pt is ALATANYA, Ox4. Calm, cooperative. Free of falls, trauma, and injuries. Skin intact ex DLES. Pt educated on treatment plan. Pt demonstrates and verbalizes understanding. VSS. BP unattainable so far during night but dopplers have been stable. Lisinopril held. No more bloody/tarry stools. Mexitil DC'd. No Audible alarms through night. Plan of care reviewed with pt.

## 2018-10-15 NOTE — PROGRESS NOTES
[facility]  Heart Transplant  Progress Note    Patient Name: Suman Hayden  MRN: [id number]  Admission Date: 10/9/2018  Hospital Length of Stay: 6 days  Attending Physician: Graciela Bautista MD  Primary Care Provider: Joe Ernst MD  Principal Problem:GI bleed    Subjective:     Interval History: Patient seen and examined today at bedside. He had stayed in bed the whole day yesterday with no acute events. Tolerated diet and minimal exertion with no recurrence of symptoms. Today plan to move around and see how he does. Pending EP consult.    Continuous Infusions:  Scheduled Meds:   amiodarone  400 mg Oral Daily    dronabinol  5 mg Oral TID    ferrous sulfate  325 mg Oral TID    furosemide  20 mg Oral BID    lisinopril  2.5 mg Oral Daily    magnesium oxide  400 mg Oral Daily    mirtazapine  7.5 mg Oral Nightly    pantoprazole  40 mg Oral Daily    pravastatin  20 mg Oral QHS    spironolactone  25 mg Oral Daily    warfarin  2 mg Oral Daily     PRN Meds:HYDROcodone-acetaminophen, polyethylene glycol    Review of patient's allergies indicates:   Allergen Reactions    Asa [aspirin] Other (See Comments)     Bleeding ulcer     Objective:     Vital Signs (Most Recent):  Temp: 99.4 °F (37.4 °C) (10/15/18 1219)  Pulse: 80 (10/15/18 1219)  Resp: 18 (10/15/18 1219)  BP: (!) 72/0 (10/15/18 1219)  SpO2: 98 % (10/15/18 1219) Vital Signs (24h Range):  Temp:  [97.9 °F (36.6 °C)-99.4 °F (37.4 °C)] 99.4 °F (37.4 °C)  Pulse:  [65-81] 80  Resp:  [18] 18  SpO2:  [92 %-99 %] 98 %  BP: ()/(0-58) 72/0     Patient Vitals for the past 72 hrs (Last 3 readings):   Weight   10/14/18 0700 69.8 kg (153 lb 14.1 oz)   10/13/18 0700 69.2 kg (152 lb 8.9 oz)     Body mass index is 22.72 kg/m².      Intake/Output Summary (Last 24 hours) at 10/15/2018 1510  Last data filed at 10/15/2018 0952  Gross per 24 hour   Intake 720 ml   Output 1525 ml   Net -805 ml       Hemodynamic Parameters:       Telemetry: No acute  events    Physical Exam   Constitutional: He is oriented to person, place, and time. He appears well-developed and well-nourished.   HENT:   Mouth/Throat: Oropharynx is clear and moist.   Neck: No JVD present.   Cardiovascular: Normal rate and regular rhythm.   VAD sounds heard   Pulmonary/Chest: Effort normal and breath sounds normal. No respiratory distress. He has no wheezes. He has no rales.   Abdominal: Soft. Bowel sounds are normal. He exhibits no distension. There is no tenderness.   Musculoskeletal: He exhibits no edema.   Neurological: He is alert and oriented to person, place, and time.   Skin:   Slightly cool.   Nursing note and vitals reviewed.      Significant Labs:  CBC:  Recent Labs   Lab  10/13/18   1816  10/14/18   0427  10/15/18   0600   WBC  7.41  5.63  5.72   RBC  4.01*  3.91*  4.04*   HGB  9.9*  9.8*  10.1*   HCT  31.8*  31.3*  32.2*   PLT  235  210  233   MCV  79*  80*  80*   MCH  24.7*  25.1*  25.0*   MCHC  31.1*  31.3*  31.4*     BNP:  Recent Labs   Lab  10/12/18   0644  10/13/18   1816  10/15/18   0600   BNP  328*  224*  167*     CMP:  Recent Labs   Lab  10/10/18   0227   10/12/18   0644   10/13/18   1816  10/14/18   0427  10/15/18   0600   GLU  75   < >  79   < >  97  79  85   CALCIUM  8.8   < >  9.4   < >  9.4  9.2  9.3   ALBUMIN  3.0*   --   3.3*   --    --    --   3.2*   PROT  6.1   --   6.8   --    --    --   6.8   NA  138   < >  136   < >  135*  134*  134*   K  3.2*   < >  4.2   < >  4.3  4.2  4.3   CO2  22*   < >  28   < >  25  26  25   CL  106   < >  102   < >  101  102  100   BUN  20   < >  18   < >  26*  25*  28*   CREATININE  1.3   < >  1.3   < >  1.6*  1.3  1.4   ALKPHOS  70   --   75   --    --    --   72   ALT  22   --   25   --    --    --   20   AST  25   --   28   --    --    --   23   BILITOT  0.4   --   0.3   --    --    --   0.3    < > = values in this interval not displayed.      Coagulation:   Recent Labs   Lab  10/13/18   0535  10/14/18   0427  10/15/18   0600   INR   "2.5*  2.3*  2.1*   APTT  31.8  30.0  29.4     LDH:  Recent Labs   Lab  10/13/18   0535  10/14/18   0427  10/15/18   0600   LDH  177  154  154     Microbiology:  Microbiology Results (last 7 days)     Procedure Component Value Units Date/Time    Blood culture [[id number]] Collected:  10/13/18 2007    Order Status:  Completed Specimen:  Blood Updated:  10/14/18 2213     Blood Culture, Routine No Growth to date     Blood Culture, Routine No Growth to date          BMP:   Recent Labs   Lab  10/15/18   0600   GLU  85   NA  134*   K  4.3   CL  100   CO2  25   BUN  28*   CREATININE  1.4   CALCIUM  9.3   MG  2.5     Cardiac Markers: No results for input(s): CKMB, TROPONINT, MYOGLOBIN in the last 72 hours.  Coagulation:   Recent Labs   Lab  10/15/18   0600   INR  2.1*   APTT  29.4     I have reviewed all pertinent labs within the past 24 hours.    Estimated Creatinine Clearance: 49.9 mL/min (based on SCr of 1.4 mg/dL).    Diagnostic Results:  I have reviewed all pertinent imaging results/findings within the past 24 hours.    Assessment and Plan:     68 yr old male with DT VAD 7/2017, NICM, atrial/ Ventricular tachycardia on amiodarone (DC mexiletine which was started on 9/25 for VT episodes due to hypotension, dizziness, diaphoresis), CAD, Obesity, Gastric polyp admitted for VAD alarm and noted low flow alarms. The VAD coordinator on-call was paged by the [facility] communication center that the patient called [facility] for a "red heart despite being connected to batteries or wall power". The VAD coordinator called spoke to the patient's wife. She reports he is feeling fine but is "having a red alarm that says to call hospital". His VAD numbers were 2.6 flow, speed 5200, 11.7 PI, and power 3.7.  The last six alarms were checked, and it was noted her had low flow alarms. Also had drop in H/H with hemoccult positive, no gross melena or bloody stools, INR 6.6 on admission. H/H remained stable, monitoring in house.     Had multiple episodes " of LH/Hypotension/dizziness over last few days/ RRT on 10/13, ICD interrogated with no acute events.     TTE 10/10/2018- LVEF < 10 %,  LVEDD 5.5 cm,global, E/e'-18, HM III, RV normal, mild TR      * GI bleed    -No evidence of acute blood though hemoccult positive with Hb 12.5 on 10/4  -> 10.1 ( 10/15)  -Watch H/H, noactive intervention yet.  -Iron studies from this year- TRACEE- start with FeSo4 325 mg TID, f/u iron studies as OP in 6 weeks.    Results for MIRTA LOPEZ (MRN [id number]) as of 10/10/2018 11:22   Ref. Range 10/4/2018 08:20 10/8/2018 10:05 10/9/2018 09:44 10/9/2018 13:32 10/10/2018 02:27   Hemoglobin Latest Ref Range: 14.0 - 18.0 g/dL 12.5 (L)  10.8 (L) 10.7 (L) 9.0 (L)   hemoccult Positive  HB trending up. No plan of endoscope        Anticoagulation monitoring, INR range 2-3    - INR 2.1, C/w Coumadin 2 mg daily.        Hypotension due to drugs    - Likely from Mexilitine, Multiple episodes since admission resolved with fluids, supine position and medication adjustment  - Monitor for recurrence        Supratherapeutic INR    possibly related to high dose of amiodarone recently started  Decrease amiodarone dose to 400mg Daily  Restart coumadin 1mg daily per PharmD          LVAD (left ventricular assist device) present    HMIII- DT implanted on 7/2017  INR goal 2-3.  Low flow alarms resolved  LVAD care per protocol  Continue coumadin 2 mg today. Home dose is 2.5mg daily  GI has no plan for Invasive imaging currently.  Change lisinopril back to 2.5mg once daily due to hypotension.      VAD Interrogation:  TXP LVAD INTERROGATIONS 10/15/2018 10/15/2018 10/15/2018 10/15/2018 10/15/2018 10/14/2018 10/14/2018   Type HeartMate3 HeartMate3 (No Data) HeartMate3 HeartMate3 HeartMate3 HeartMate3   Flow 3.8 3.8 - 3.9 3.7 4.4 4.9   Speed 5200 5200 - 5200 5200 5200 5200   PI 3.3 3.6 - 4.8 4.8 2.8 4.2   Power ([place]) 3.5 3.5 - 3.5 3.4 3.6 3.8   LSL - 4800 - - - 4800 -   Pulsatility - Pulse - - - - -   }                   Atrial tachycardia    - c/w amiodarone        VT (ventricular tachycardia)    C/w amiodarone 400mg daily.  Patient was supposed to get ablation with Dr. Rodriguez.  Complaining of dizziness and blurry vision after taking mexiletine  Will D/C medication and contact EP today  -RRT 10/13, ICD interrogated - no acute events          Essential hypertension    - Continue lisinopril 2.5mg daily and aldactone  - Monitor BP closely due to labile course          Nonischemic cardiomyopathy    - c/w PO Lasix  - TTE 10/10/2018- LVEDD 5.5 cm, LVEF <10%, global, E/e'-18, HM III, RV normal, mild TR          D/w Dr. Omega Roland MD  Heart Transplant  [facility]

## 2018-10-15 NOTE — ASSESSMENT & PLAN NOTE
- Likely from Mexilitine, Multiple episodes since admission resolved with fluids, supine position and medication adjustment  - Monitor for recurrence

## 2018-10-15 NOTE — SUBJECTIVE & OBJECTIVE
Interval History: No events overnight. Patient and wife describe his episodes as starting with blurry vision, then progressing to nausea, lightheadedness and then a fugue state, needing about a 15-25min recovery afterwards.    He states he has had 3 of these episodes (2 on consecutive days) after since starting mexiletine, the last 2 were in the last 2 days and were noted to coincide with hypotension.    Tele: Regular rhythm with significant LVAD-associated baseline artifact.    ROS  Objective:     Vital Signs (Most Recent):  Temp: 98.5 °F (36.9 °C) (10/15/18 1601)  Pulse: 79 (10/15/18 1605)  Resp: 18 (10/15/18 1601)  BP: (!) 88/70 (10/15/18 1605)  SpO2: 99 % (10/15/18 1605) Vital Signs (24h Range):  Temp:  [97.9 °F (36.6 °C)-99.4 °F (37.4 °C)] 98.5 °F (36.9 °C)  Pulse:  [65-81] 79  Resp:  [18] 18  SpO2:  [92 %-100 %] 99 %  BP: ()/(0-70) 88/70     Weight: 69.8 kg (153 lb 14.1 oz)  Body mass index is 22.72 kg/m².     SpO2: 99 %  O2 Device (Oxygen Therapy): room air    Physical Exam   Constitutional: He is oriented to person, place, and time. He appears well-developed and well-nourished. No distress.   HENT:   Head: Normocephalic and atraumatic.   Mouth/Throat: Oropharynx is clear and moist.   Eyes: EOM are normal. Pupils are equal, round, and reactive to light.   Neck: Normal range of motion. Neck supple. No JVD present.   Cardiovascular: Regular rhythm. Exam reveals no gallop and no friction rub.   No murmur heard.  LVAD hum obscuring heart sounds   Pulmonary/Chest: Effort normal and breath sounds normal. No respiratory distress. He has no wheezes. He has no rales. He exhibits no tenderness.   Abdominal: Soft. Bowel sounds are normal. He exhibits no distension. There is no tenderness.   Musculoskeletal: Normal range of motion. He exhibits no edema.   Lymphadenopathy:     He has no cervical adenopathy.   Neurological: He is alert and oriented to person, place, and time.   Skin: Skin is warm and dry. No  erythema.   Psychiatric: He has a normal mood and affect. His behavior is normal. Judgment and thought content normal.       Significant Labs:   EP:   Recent Labs   Lab  10/13/18   1816  10/14/18   0427  10/15/18   0600   NA  135*  134*  134*   K  4.3  4.2  4.3   CL  101  102  100   CO2  25  26  25   GLU  97  79  85   BUN  26*  25*  28*   CREATININE  1.6*  1.3  1.4   CALCIUM  9.4  9.2  9.3   PROT   --    --   6.8   ALBUMIN   --    --   3.2*   BILITOT   --    --   0.3   ALKPHOS   --    --   72   AST   --    --   23   ALT   --    --   20   ANIONGAP  9  6*  9   ESTGFRAFRICA  50.4*  >60.0  59.3*   EGFRNONAA  43.6*  56.1*  51.3*   WBC  7.41  5.63  5.72   HGB  9.9*  9.8*  10.1*   HCT  31.8*  31.3*  32.2*   PLT  235  210  233   INR   --   2.3*  2.1*

## 2018-10-15 NOTE — PROGRESS NOTES
10-11-18: Discussed case with , with regards to scheduling EP procedure: MD states it is okay to schedule in December 2018.  Alyssa GASTELUM

## 2018-10-15 NOTE — PLAN OF CARE
Problem: Patient Care Overview  Goal: Plan of Care Review  Outcome: Ongoing (interventions implemented as appropriate)  Pt is A, A, Ox4. Calm, cooperative. Free of falls, trauma, and injuries. Skin intact ex DLES. Pt educated on treatment plan. Pt demonstrates and verbalizes understanding. VSS. EP consulted. Plan of care reviewed with pt.

## 2018-10-15 NOTE — ASSESSMENT & PLAN NOTE
-No evidence of acute blood though hemoccult positive with Hb 12.5 on 10/4  -> 10.1 ( 10/15)  -Watch H/H, noactive intervention yet.  -Iron studies from this year- TRACEE- start with FeSo4 325 mg TID, f/u iron studies as OP in 6 weeks.    Results for MIRTA LOPEZ (MRN [id number]) as of 10/10/2018 11:22   Ref. Range 10/4/2018 08:20 10/8/2018 10:05 10/9/2018 09:44 10/9/2018 13:32 10/10/2018 02:27   Hemoglobin Latest Ref Range: 14.0 - 18.0 g/dL 12.5 (L)  10.8 (L) 10.7 (L) 9.0 (L)   hemoccult Positive  HB trending up. No plan of endoscope

## 2018-10-15 NOTE — ASSESSMENT & PLAN NOTE
HMIII- DT implanted on 7/2017  INR goal 2-3.  Low flow alarms resolved  LVAD care per protocol  Continue coumadin 2 mg today. Home dose is 2.5mg daily  GI has no plan for Invasive imaging currently.  Change lisinopril back to 2.5mg once daily due to hypotension.      VAD Interrogation:  TXP LVAD INTERROGATIONS 10/15/2018 10/15/2018 10/15/2018 10/15/2018 10/15/2018 10/14/2018 10/14/2018   Type HeartMate3 HeartMate3 (No Data) HeartMate3 HeartMate3 HeartMate3 HeartMate3   Flow 3.8 3.8 - 3.9 3.7 4.4 4.9   Speed 5200 5200 - 5200 5200 5200 5200   PI 3.3 3.6 - 4.8 4.8 2.8 4.2   Power ([place]) 3.5 3.5 - 3.5 3.4 3.6 3.8   LSL - 4800 - - - 4800 -   Pulsatility - Pulse - - - - -   }

## 2018-10-15 NOTE — SUBJECTIVE & OBJECTIVE
Interval History: Patient seen and examined today at bedside. He had stayed in bed the whole day yesterday with no acute events. Tolerated diet and minimal exertion with no recurrence of symptoms. Today plan to move around and see how he does. Pending EP consult.    Continuous Infusions:  Scheduled Meds:   amiodarone  400 mg Oral Daily    dronabinol  5 mg Oral TID    ferrous sulfate  325 mg Oral TID    furosemide  20 mg Oral BID    lisinopril  2.5 mg Oral Daily    magnesium oxide  400 mg Oral Daily    mirtazapine  7.5 mg Oral Nightly    pantoprazole  40 mg Oral Daily    pravastatin  20 mg Oral QHS    spironolactone  25 mg Oral Daily    warfarin  2 mg Oral Daily     PRN Meds:HYDROcodone-acetaminophen, polyethylene glycol    Review of patient's allergies indicates:   Allergen Reactions    Asa [aspirin] Other (See Comments)     Bleeding ulcer     Objective:     Vital Signs (Most Recent):  Temp: 99.4 °F (37.4 °C) (10/15/18 1219)  Pulse: 80 (10/15/18 1219)  Resp: 18 (10/15/18 1219)  BP: (!) 72/0 (10/15/18 1219)  SpO2: 98 % (10/15/18 1219) Vital Signs (24h Range):  Temp:  [97.9 °F (36.6 °C)-99.4 °F (37.4 °C)] 99.4 °F (37.4 °C)  Pulse:  [65-81] 80  Resp:  [18] 18  SpO2:  [92 %-99 %] 98 %  BP: ()/(0-58) 72/0     Patient Vitals for the past 72 hrs (Last 3 readings):   Weight   10/14/18 0700 69.8 kg (153 lb 14.1 oz)   10/13/18 0700 69.2 kg (152 lb 8.9 oz)     Body mass index is 22.72 kg/m².      Intake/Output Summary (Last 24 hours) at 10/15/2018 1510  Last data filed at 10/15/2018 0952  Gross per 24 hour   Intake 720 ml   Output 1525 ml   Net -805 ml       Hemodynamic Parameters:       Telemetry: No acute events    Physical Exam   Constitutional: He is oriented to person, place, and time. He appears well-developed and well-nourished.   HENT:   Mouth/Throat: Oropharynx is clear and moist.   Neck: No JVD present.   Cardiovascular: Normal rate and regular rhythm.   VAD sounds heard   Pulmonary/Chest: Effort  normal and breath sounds normal. No respiratory distress. He has no wheezes. He has no rales.   Abdominal: Soft. Bowel sounds are normal. He exhibits no distension. There is no tenderness.   Musculoskeletal: He exhibits no edema.   Neurological: He is alert and oriented to person, place, and time.   Skin:   Slightly cool.   Nursing note and vitals reviewed.      Significant Labs:  CBC:  Recent Labs   Lab  10/13/18   1816  10/14/18   0427  10/15/18   0600   WBC  7.41  5.63  5.72   RBC  4.01*  3.91*  4.04*   HGB  9.9*  9.8*  10.1*   HCT  31.8*  31.3*  32.2*   PLT  235  210  233   MCV  79*  80*  80*   MCH  24.7*  25.1*  25.0*   MCHC  31.1*  31.3*  31.4*     BNP:  Recent Labs   Lab  10/12/18   0644  10/13/18   1816  10/15/18   0600   BNP  328*  224*  167*     CMP:  Recent Labs   Lab  10/10/18   0227   10/12/18   0644   10/13/18   1816  10/14/18   0427  10/15/18   0600   GLU  75   < >  79   < >  97  79  85   CALCIUM  8.8   < >  9.4   < >  9.4  9.2  9.3   ALBUMIN  3.0*   --   3.3*   --    --    --   3.2*   PROT  6.1   --   6.8   --    --    --   6.8   NA  138   < >  136   < >  135*  134*  134*   K  3.2*   < >  4.2   < >  4.3  4.2  4.3   CO2  22*   < >  28   < >  25  26  25   CL  106   < >  102   < >  101  102  100   BUN  20   < >  18   < >  26*  25*  28*   CREATININE  1.3   < >  1.3   < >  1.6*  1.3  1.4   ALKPHOS  70   --   75   --    --    --   72   ALT  22   --   25   --    --    --   20   AST  25   --   28   --    --    --   23   BILITOT  0.4   --   0.3   --    --    --   0.3    < > = values in this interval not displayed.      Coagulation:   Recent Labs   Lab  10/13/18   0535  10/14/18   0427  10/15/18   0600   INR  2.5*  2.3*  2.1*   APTT  31.8  30.0  29.4     LDH:  Recent Labs   Lab  10/13/18   0535  10/14/18   0427  10/15/18   0600   LDH  177  154  154     Microbiology:  Microbiology Results (last 7 days)     Procedure Component Value Units Date/Time    Blood culture [[id number]] Collected:  10/13/18 2007    Order  Status:  Completed Specimen:  Blood Updated:  10/14/18 2213     Blood Culture, Routine No Growth to date     Blood Culture, Routine No Growth to date          BMP:   Recent Labs   Lab  10/15/18   0600   GLU  85   NA  134*   K  4.3   CL  100   CO2  25   BUN  28*   CREATININE  1.4   CALCIUM  9.3   MG  2.5     Cardiac Markers: No results for input(s): CKMB, TROPONINT, MYOGLOBIN in the last 72 hours.  Coagulation:   Recent Labs   Lab  10/15/18   0600   INR  2.1*   APTT  29.4     I have reviewed all pertinent labs within the past 24 hours.    Estimated Creatinine Clearance: 49.9 mL/min (based on SCr of 1.4 mg/dL).    Diagnostic Results:  I have reviewed all pertinent imaging results/findings within the past 24 hours.

## 2018-10-15 NOTE — PROGRESS NOTES
[facility]  Cardiac Electrophysiology  Progress Note    Admission Date: 10/9/2018  Code Status: Full Code   Attending Physician: Graciela Bautista MD   Expected Discharge Date: 10/23/2018  Principal Problem:GI bleed    Subjective:     Interval History: No events overnight. Patient and wife describe his episodes as starting with blurry vision, then progressing to nausea, lightheadedness and then a fugue state, needing about a 15-25min recovery afterwards.    He states he has had 3 of these episodes (2 on consecutive days) after since starting mexiletine, the last 2 were in the last 2 days and were noted to coincide with hypotension.    Tele: Regular rhythm with significant LVAD-associated baseline artifact.    ROS  Objective:     Vital Signs (Most Recent):  Temp: 98.5 °F (36.9 °C) (10/15/18 1601)  Pulse: 79 (10/15/18 1605)  Resp: 18 (10/15/18 1601)  BP: (!) 88/70 (10/15/18 1605)  SpO2: 99 % (10/15/18 1605) Vital Signs (24h Range):  Temp:  [97.9 °F (36.6 °C)-99.4 °F (37.4 °C)] 98.5 °F (36.9 °C)  Pulse:  [65-81] 79  Resp:  [18] 18  SpO2:  [92 %-100 %] 99 %  BP: ()/(0-70) 88/70     Weight: 69.8 kg (153 lb 14.1 oz)  Body mass index is 22.72 kg/m².     SpO2: 99 %  O2 Device (Oxygen Therapy): room air    Physical Exam   Constitutional: He is oriented to person, place, and time. He appears well-developed and well-nourished. No distress.   HENT:   Head: Normocephalic and atraumatic.   Mouth/Throat: Oropharynx is clear and moist.   Eyes: EOM are normal. Pupils are equal, round, and reactive to light.   Neck: Normal range of motion. Neck supple. No JVD present.   Cardiovascular: Regular rhythm. Exam reveals no gallop and no friction rub.   No murmur heard.  LVAD hum obscuring heart sounds   Pulmonary/Chest: Effort normal and breath sounds normal. No respiratory distress. He has no wheezes. He has no rales. He exhibits no tenderness.   Abdominal: Soft. Bowel sounds are normal. He exhibits no  distension. There is no tenderness.   Musculoskeletal: Normal range of motion. He exhibits no edema.   Lymphadenopathy:     He has no cervical adenopathy.   Neurological: He is alert and oriented to person, place, and time.   Skin: Skin is warm and dry. No erythema.   Psychiatric: He has a normal mood and affect. His behavior is normal. Judgment and thought content normal.       Significant Labs:   EP:   Recent Labs   Lab  10/13/18   1816  10/14/18   0427  10/15/18   0600   NA  135*  134*  134*   K  4.3  4.2  4.3   CL  101  102  100   CO2  25  26  25   GLU  97  79  85   BUN  26*  25*  28*   CREATININE  1.6*  1.3  1.4   CALCIUM  9.4  9.2  9.3   PROT   --    --   6.8   ALBUMIN   --    --   3.2*   BILITOT   --    --   0.3   ALKPHOS   --    --   72   AST   --    --   23   ALT   --    --   20   ANIONGAP  9  6*  9   ESTGFRAFRICA  50.4*  >60.0  59.3*   EGFRNONAA  43.6*  56.1*  51.3*   WBC  7.41  5.63  5.72   HGB  9.9*  9.8*  10.1*   HCT  31.8*  31.3*  32.2*   PLT  235  210  233   INR   --   2.3*  2.1*         Assessment and Plan:     VT (ventricular tachycardia)    68 AA man with NICM s/p HM3 LVAD implanted as destination therapy 7/27/17. He underwent ICD revision on 11/20/17 with Dr. Winchester (DC ICD placed with active RA/LV leads (RV lead capped during revision) that was complicated by pocket hematoma. He initially presented to the ED with weakness and an associated tachycardia that was found to be VT on telemetry. His device was used to pace him out of VT successfully. Burst 10 @ 91% R-R. He was discharged on mexiletine 200mg BID and had been tolerating it. However after initiation he had one episode described as vaso-vagal, then he had 2 more in the last 2 days.    Mexiletine has not been reported to cause these types of episodes, vaso-vagal reactions can happen from dehydration.    - will monitor for recurrence of VT off mexiletine  - consider compression stockings if episodes recur  - ICD interrogation to check for  VT recurrence  - will sign off, please reconsult with any new issues            Elijah Grace MD  Cardiac Electrophysiology  [facility]

## 2018-10-15 NOTE — ASSESSMENT & PLAN NOTE
- c/w PO Lasix  - TTE 10/10/2018- LVEDD 5.5 cm, LVEF <10%, global, E/e'-18, HM III, RV normal, mild TR

## 2018-10-15 NOTE — PROGRESS NOTES
Pt and wife AAAO.  Checking in on pt, they report doing ok, waiting to find out about ablation.  No questions for me at this time.

## 2018-10-16 NOTE — PHYSICIAN QUERY
PT Name: Suman Hayden  MR #: [id number]     Physician Query Form - Documentation Clarification      CDS/: Flora Michelle               Contact information: [email or web address]      This form is a permanent document in the medical record.     Query Date: October 16, 2018    By submitting this query, we are merely seeking further clarification of documentation. Please utilize your independent clinical judgment when addressing the question(s) below.    The Medical record reflects the following:    Supporting Clinical Findings Location in Medical Record     Nonischemic cardiomyopathy    - c/w PO Lasix  - TTE 10/10/2018- LVEDD 5.5 cm, LVEF <10%, global, E/e'-18, HM III, RV normal, mild TR      LVAD (left ventricular assist device) present       HTS progress note 10/15                                                                             Doctor, Please specify diagnosis or diagnoses associated with above clinical findings.    Provider Use Only        Please specify the type of  Non-Ischemic Cardiomyopathy:    [    xxxxxxxxxxxx ] Dilated Cardiomyopathy  [     ] Hypertrophic Cardiomyopathy   [     ] Restrictive Cardiomyopathy   [     ] Other:_________________                                                                                                             [  ] Clinically undetermined

## 2018-10-16 NOTE — PLAN OF CARE
Problem: Patient Care Overview  Goal: Plan of Care Review  Outcome: Ongoing (interventions implemented as appropriate)  Patient remains free from falls and injuries through out shift. Patient AAO and VSS. Pt dopplers and LVAD number WNL. Decrease amiodarone dose to 400mg Daily due to possible effects of having supra-therapeatic INR. Will D/C  and contact EP today  Pt Patient denies chest pain and SOB. Patient's family at bedside. Plan of care reviewed with patient. Patient verbalizes understanding of plan.  Will continue to monitor.

## 2018-10-16 NOTE — SUBJECTIVE & OBJECTIVE
Interval History:  Patient seen and examined today at bedside. He is feeling better yesterday. Walked to the restroom with no complaints. No lightheadedness, SOB.  Willing to try walking outside. Agreeable to compression stockings.    Continuous Infusions:  Scheduled Meds:   amiodarone  400 mg Oral Daily    dronabinol  5 mg Oral TID    ferrous sulfate  325 mg Oral TID    lisinopril  2.5 mg Oral Daily    magnesium oxide  400 mg Oral Daily    mirtazapine  7.5 mg Oral Nightly    pantoprazole  40 mg Oral Daily    pravastatin  20 mg Oral QHS    spironolactone  25 mg Oral Daily    warfarin  2 mg Oral Daily     PRN Meds:HYDROcodone-acetaminophen, polyethylene glycol    Review of patient's allergies indicates:   Allergen Reactions    Asa [aspirin] Other (See Comments)     Bleeding ulcer     Objective:     Vital Signs (Most Recent):  Temp: 98.4 °F (36.9 °C) (10/16/18 1209)  Pulse: 79 (10/16/18 1149)  Resp: 18 (10/16/18 1209)  BP: 99/71 (10/16/18 1210)  SpO2: 96 % (10/16/18 1209) Vital Signs (24h Range):  Temp:  [97.8 °F (36.6 °C)-98.5 °F (36.9 °C)] 98.4 °F (36.9 °C)  Pulse:  [79-80] 79  Resp:  [17-18] 18  SpO2:  [96 %-100 %] 96 %  BP: (70-99)/(0-71) 99/71     Patient Vitals for the past 72 hrs (Last 3 readings):   Weight   10/16/18 0800 69.7 kg (153 lb 10.6 oz)   10/14/18 0700 69.8 kg (153 lb 14.1 oz)     Body mass index is 22.69 kg/m².      Intake/Output Summary (Last 24 hours) at 10/16/2018 1440  Last data filed at 10/16/2018 0500  Gross per 24 hour   Intake 480 ml   Output 850 ml   Net -370 ml       Hemodynamic Parameters:       Telemetry:  NSR    Physical Exam   Constitutional: He is oriented to person, place, and time. He appears well-developed and well-nourished.   HENT:   Mouth/Throat: Oropharynx is clear and moist.   Neck: No JVD present.   Cardiovascular: Normal rate and regular rhythm.   Smooth VAD sounds heard   Pulmonary/Chest: Effort normal and breath sounds normal. No respiratory distress. He has no  wheezes. He has no rales.   Abdominal: Soft. Bowel sounds are normal. He exhibits no distension. There is no tenderness.   Musculoskeletal: He exhibits no edema.   Neurological: He is alert and oriented to person, place, and time.   Skin: Skin is warm.   Nursing note and vitals reviewed.      Significant Labs:  CBC:  Recent Labs   Lab  10/14/18   0427  10/15/18   0600  10/16/18   0517   WBC  5.63  5.72  5.73   RBC  3.91*  4.04*  3.80*   HGB  9.8*  10.1*  9.5*   HCT  31.3*  32.2*  30.2*   PLT  210  233  206   MCV  80*  80*  80*   MCH  25.1*  25.0*  25.0*   MCHC  31.3*  31.4*  31.5*     BNP:  Recent Labs   Lab  10/12/18   0644  10/13/18   1816  10/15/18   0600   BNP  328*  224*  167*     CMP:  Recent Labs   Lab  10/10/18   0227   10/12/18   0644   10/14/18   0427  10/15/18   0600  10/16/18   0517   GLU  75   < >  79   < >  79  85  87   CALCIUM  8.8   < >  9.4   < >  9.2  9.3  9.4   ALBUMIN  3.0*   --   3.3*   --    --   3.2*   --    PROT  6.1   --   6.8   --    --   6.8   --    NA  138   < >  136   < >  134*  134*  134*   K  3.2*   < >  4.2   < >  4.2  4.3  4.5   CO2  22*   < >  28   < >  26  25  27   CL  106   < >  102   < >  102  100  101   BUN  20   < >  18   < >  25*  28*  25*   CREATININE  1.3   < >  1.3   < >  1.3  1.4  1.2   ALKPHOS  70   --   75   --    --   72   --    ALT  22   --   25   --    --   20   --    AST  25   --   28   --    --   23   --    BILITOT  0.4   --   0.3   --    --   0.3   --     < > = values in this interval not displayed.      Coagulation:   Recent Labs   Lab  10/14/18   0427  10/15/18   0600  10/16/18   0517   INR  2.3*  2.1*  2.0*   APTT  30.0  29.4  28.6     LDH:  Recent Labs   Lab  10/14/18   0427  10/15/18   0600  10/16/18   0517   LDH  154  154  148     Microbiology:  Microbiology Results (last 7 days)     Procedure Component Value Units Date/Time    Blood culture [[id number]] Collected:  10/13/18 2007    Order Status:  Completed Specimen:  Blood Updated:  10/15/18 2212     Blood  Culture, Routine No Growth to date     Blood Culture, Routine No Growth to date     Blood Culture, Routine No Growth to date          BMP:   Recent Labs   Lab  10/16/18   0517   GLU  87   NA  134*   K  4.5   CL  101   CO2  27   BUN  25*   CREATININE  1.2   CALCIUM  9.4   MG  2.5     Cardiac Markers: No results for input(s): CKMB, TROPONINT, MYOGLOBIN in the last 72 hours.  Coagulation:   Recent Labs   Lab  10/16/18   0517   INR  2.0*   APTT  28.6     I have reviewed all pertinent labs within the past 24 hours.    Estimated Creatinine Clearance: 58.1 mL/min (based on SCr of 1.2 mg/dL).    Diagnostic Results:  I have reviewed all pertinent imaging results/findings within the past 24 hours.

## 2018-10-16 NOTE — ASSESSMENT & PLAN NOTE
C/w amiodarone 400mg daily.  Patient was supposed to get ablation with Dr. Rodriguez.  Complaining of dizziness and blurry vision after taking mexiletine  Will D/C meclizine  Appreciate EP consult- DC meclizine, compression stockings, ICD with no events.  -RRT 10/13, ICD interrogated - no acute events

## 2018-10-16 NOTE — PROGRESS NOTES
[facility]  Heart Transplant  Progress Note    Patient Name: Suman Hayden  MRN: [id number]  Admission Date: 10/9/2018  Hospital Length of Stay: 7 days  Attending Physician: Graciela Bautista MD  Primary Care Provider: Joe Ernst MD  Principal Problem:GI bleed    Subjective:     Interval History:  Patient seen and examined today at bedside. He is feeling better yesterday. Walked to the restroom with no complaints. No lightheadedness, SOB.  Willing to try walking outside. Agreeable to compression stockings.    Continuous Infusions:  Scheduled Meds:   amiodarone  400 mg Oral Daily    dronabinol  5 mg Oral TID    ferrous sulfate  325 mg Oral TID    lisinopril  2.5 mg Oral Daily    magnesium oxide  400 mg Oral Daily    mirtazapine  7.5 mg Oral Nightly    pantoprazole  40 mg Oral Daily    pravastatin  20 mg Oral QHS    spironolactone  25 mg Oral Daily    warfarin  2 mg Oral Daily     PRN Meds:HYDROcodone-acetaminophen, polyethylene glycol    Review of patient's allergies indicates:   Allergen Reactions    Asa [aspirin] Other (See Comments)     Bleeding ulcer     Objective:     Vital Signs (Most Recent):  Temp: 98.4 °F (36.9 °C) (10/16/18 1209)  Pulse: 79 (10/16/18 1149)  Resp: 18 (10/16/18 1209)  BP: 99/71 (10/16/18 1210)  SpO2: 96 % (10/16/18 1209) Vital Signs (24h Range):  Temp:  [97.8 °F (36.6 °C)-98.5 °F (36.9 °C)] 98.4 °F (36.9 °C)  Pulse:  [79-80] 79  Resp:  [17-18] 18  SpO2:  [96 %-100 %] 96 %  BP: (70-99)/(0-71) 99/71     Patient Vitals for the past 72 hrs (Last 3 readings):   Weight   10/16/18 0800 69.7 kg (153 lb 10.6 oz)   10/14/18 0700 69.8 kg (153 lb 14.1 oz)     Body mass index is 22.69 kg/m².      Intake/Output Summary (Last 24 hours) at 10/16/2018 1440  Last data filed at 10/16/2018 0500  Gross per 24 hour   Intake 480 ml   Output 850 ml   Net -370 ml       Hemodynamic Parameters:       Telemetry:  Paced with significant artifacts    Physical Exam   Constitutional: He  is oriented to person, place, and time. He appears well-developed and well-nourished.   HENT:   Mouth/Throat: Oropharynx is clear and moist.   Neck: No JVD present.   Cardiovascular: Normal rate and regular rhythm.   Smooth VAD sounds heard   Pulmonary/Chest: Effort normal and breath sounds normal. No respiratory distress. He has no wheezes. He has no rales.   Abdominal: Soft. Bowel sounds are normal. He exhibits no distension. There is no tenderness.   Musculoskeletal: He exhibits no edema.   Neurological: He is alert and oriented to person, place, and time.   Skin: Skin is warm.   Nursing note and vitals reviewed.      Significant Labs:  CBC:  Recent Labs   Lab  10/14/18   0427  10/15/18   0600  10/16/18   0517   WBC  5.63  5.72  5.73   RBC  3.91*  4.04*  3.80*   HGB  9.8*  10.1*  9.5*   HCT  31.3*  32.2*  30.2*   PLT  210  233  206   MCV  80*  80*  80*   MCH  25.1*  25.0*  25.0*   MCHC  31.3*  31.4*  31.5*     BNP:  Recent Labs   Lab  10/12/18   0644  10/13/18   1816  10/15/18   0600   BNP  328*  224*  167*     CMP:  Recent Labs   Lab  10/10/18   0227   10/12/18   0644   10/14/18   0427  10/15/18   0600  10/16/18   0517   GLU  75   < >  79   < >  79  85  87   CALCIUM  8.8   < >  9.4   < >  9.2  9.3  9.4   ALBUMIN  3.0*   --   3.3*   --    --   3.2*   --    PROT  6.1   --   6.8   --    --   6.8   --    NA  138   < >  136   < >  134*  134*  134*   K  3.2*   < >  4.2   < >  4.2  4.3  4.5   CO2  22*   < >  28   < >  26  25  27   CL  106   < >  102   < >  102  100  101   BUN  20   < >  18   < >  25*  28*  25*   CREATININE  1.3   < >  1.3   < >  1.3  1.4  1.2   ALKPHOS  70   --   75   --    --   72   --    ALT  22   --   25   --    --   20   --    AST  25   --   28   --    --   23   --    BILITOT  0.4   --   0.3   --    --   0.3   --     < > = values in this interval not displayed.      Coagulation:   Recent Labs   Lab  10/14/18   0427  10/15/18   0600  10/16/18   0517   INR  2.3*  2.1*  2.0*   APTT  30.0  29.4  28.6  "    LDH:  Recent Labs   Lab  10/14/18   0427  10/15/18   0600  10/16/18   0517   LDH  154  154  148     Microbiology:  Microbiology Results (last 7 days)     Procedure Component Value Units Date/Time    Blood culture [[id number]] Collected:  10/13/18 2007    Order Status:  Completed Specimen:  Blood Updated:  10/15/18 2212     Blood Culture, Routine No Growth to date     Blood Culture, Routine No Growth to date     Blood Culture, Routine No Growth to date          BMP:   Recent Labs   Lab  10/16/18   0517   GLU  87   NA  134*   K  4.5   CL  101   CO2  27   BUN  25*   CREATININE  1.2   CALCIUM  9.4   MG  2.5     Cardiac Markers: No results for input(s): CKMB, TROPONINT, MYOGLOBIN in the last 72 hours.  Coagulation:   Recent Labs   Lab  10/16/18   0517   INR  2.0*   APTT  28.6     I have reviewed all pertinent labs within the past 24 hours.    Estimated Creatinine Clearance: 58.1 mL/min (based on SCr of 1.2 mg/dL).    Diagnostic Results:  I have reviewed all pertinent imaging results/findings within the past 24 hours.    Assessment and Plan:     68 yr old male with DT VAD 7/2017, NICM, atrial/ Ventricular tachycardia on amiodarone (DC mexiletine which was started on 9/25 for VT episodes due to hypotension, dizziness, diaphoresis), CAD, Obesity, Gastric polyp admitted for VAD alarm and noted low flow alarms. The VAD coordinator on-call was paged by the [facility] communication center that the patient called [facility] for a "red heart despite being connected to batteries or wall power". The VAD coordinator called spoke to the patient's wife. She reports he is feeling fine but is "having a red alarm that says to call hospital". His VAD numbers were 2.6 flow, speed 5200, 11.7 PI, and power 3.7.  The last six alarms were checked, and it was noted her had low flow alarms. Also had drop in H/H with hemoccult positive, no gross melena or bloody stools, INR 6.6 on admission. H/H remained stable, monitoring in house.      Had multiple " episodes of LH/Hypotension/dizziness over last few days/ RRT on 10/13, ICD interrogated with no acute events.      TTE 10/10/2018- LVEF < 10 %,  LVEDD 5.5 cm,global, E/e'-18, HM III, RV normal, mild TR      * GI bleed    -No evidence of acute blood though hemoccult positive with Hb 12.5 on 10/4  -> 10.1 -> 9.5 ( 10/16)  -Watch H/H, noactive intervention yet.  -Iron studies from this year- [facility]ACEE- start with FeSo4 325 mg TID, f/u iron studies as OP in 6 weeks.    Results for MIRTA LOPEZ (MRN [id number]) as of 10/10/2018 11:22   Ref. Range 10/4/2018 08:20 10/8/2018 10:05 10/9/2018 09:44 10/9/2018 13:32 10/10/2018 02:27   Hemoglobin Latest Ref Range: 14.0 - 18.0 g/dL 12.5 (L)  10.8 (L) 10.7 (L) 9.0 (L)   hemoccult Positive  HB trending up. No plan of endoscope        Iron deficiency anemia due to chronic blood loss    - s/w Ferrous sulfate 325 mg TID. Plan to repeat as OP in 6 weeks.        Anticoagulation monitoring, INR range 2-3    - INR 2.1, C/w Coumadin 2 mg daily.        Hypotension due to drugs    - Likely from Mexilitine, Multiple episodes since admission resolved with fluids, supine position and medication adjustment  - Monitor for recurrence        Supratherapeutic INR    possibly related to high dose of amiodarone recently started  Decrease amiodarone dose to 400mg Daily  Restart coumadin , INR 2.0 on coumadin 2 mg po daily          LVAD (left ventricular assist device) present    HMIII- DT implanted on 7/2017  INR goal 2-3.  Low flow alarms resolved  LVAD care per protocol  Continue coumadin 2 mg today. Home dose is 2.5mg daily  GI has no plan for Invasive imaging currently.  Change lisinopril back to 2.5mg once daily due to hypotension.      VAD Interrogation:  TXP LVAD INTERROGATIONS 10/16/2018 10/16/2018 10/16/2018 10/16/2018 10/15/2018 10/15/2018 10/15/2018   Type HeartMate3 (No Data) HeartMate3 HeartMate3 HeartMate3 HeartMate3 HeartMate3   Flow 3.8 - 4.0 3.4 4.0 4.0 4.0   Speed 5200 - 5200 5200 5200  5200 5200   PI 5.0 - 3.6 7.0 4.8 3.2 4.6   Power ([place]) 3.6 - 3.5 3.5 3.5 3.4 3.5   LSL 4800 - 4800 4800 4800 4800 -   Pulsatility Intermittent pulse - Intermittent pulse Intermittent pulse Intermittent pulse Intermittent pulse -   }                  Atrial tachycardia    - c/w amiodarone        Chronic combined systolic and diastolic congestive heart failure    - Euvolemic on exam. Held lasix on 10/16/2018.        VT (ventricular tachycardia)    C/w amiodarone 400mg daily.  Patient was supposed to get ablation with Dr. Rodriguez.  Complaining of dizziness and blurry vision after taking mexiletine  Will D/C meclizine  Appreciate EP consult- DC meclizine, compression stockings, ICD with no events.  -RRT 10/13, ICD interrogated - no acute events          Essential hypertension    - Continue lisinopril 2.5mg daily and aldactone  - Monitor BP closely due to labile course          Nonischemic cardiomyopathy    - c/w po Lasix  - TTE 10/10/2018- LVEDD 5.5 cm, LVEF <10%, global, E/e'-18, HM III, RV normal, mild TR          Case d/w Dr. Omega Roland MD  Heart Transplant  [facility]

## 2018-10-16 NOTE — ASSESSMENT & PLAN NOTE
-No evidence of acute blood though hemoccult positive with Hb 12.5 on 10/4  -> 10.1 -> 9.5 ( 10/16)  -Watch H/H, noactive intervention yet.  -Iron studies from this year- [facility]- start with FeSo4 325 mg TID, f/u iron studies as OP in 6 weeks.    Results for JOHN MIRTA CUNNINGHAM (MRN [id number]) as of 10/10/2018 11:22   Ref. Range 10/4/2018 08:20 10/8/2018 10:05 10/9/2018 09:44 10/9/2018 13:32 10/10/2018 02:27   Hemoglobin Latest Ref Range: 14.0 - 18.0 g/dL 12.5 (L)  10.8 (L) 10.7 (L) 9.0 (L)   hemoccult Positive  HB trending up. No plan of endoscope

## 2018-10-16 NOTE — ASSESSMENT & PLAN NOTE
HMIII- DT implanted on 7/2017  INR goal 2-3.  Low flow alarms resolved  LVAD care per protocol  Continue coumadin 2 mg today. Home dose is 2.5mg daily  GI has no plan for Invasive imaging currently.  Change lisinopril back to 2.5mg once daily due to hypotension.      VAD Interrogation:  TXP LVAD INTERROGATIONS 10/16/2018 10/16/2018 10/16/2018 10/16/2018 10/15/2018 10/15/2018 10/15/2018   Type HeartMate3 (No Data) HeartMate3 HeartMate3 HeartMate3 HeartMate3 HeartMate3   Flow 3.8 - 4.0 3.4 4.0 4.0 4.0   Speed 5200 - 5200 5200 5200 5200 5200   PI 5.0 - 3.6 7.0 4.8 3.2 4.6   Power ([place]) 3.6 - 3.5 3.5 3.5 3.4 3.5   LSL 4800 - 4800 4800 4800 4800 -   Pulsatility Intermittent pulse - Intermittent pulse Intermittent pulse Intermittent pulse Intermittent pulse -   }

## 2018-10-16 NOTE — ASSESSMENT & PLAN NOTE
possibly related to high dose of amiodarone recently started  Decrease amiodarone dose to 400mg Daily  Restart coumadin , INR 2.0 on coumadin 2 mg po daily

## 2018-10-16 NOTE — PLAN OF CARE
Problem: Patient Care Overview  Goal: Plan of Care Review  Outcome: Ongoing (interventions implemented as appropriate)  Pt free of falls, injury this shift. POC reviewed with pt, wife at bedside, verbalized understanding. GIB resolved. EP following after Mexilitine D/C, no vasovagal episodes this shift. Pt ambulated 1x around unit with wife, reports feeling very well after walk. Possible D/C in AM. VSS, NAD noted. VAD numbers WNL. Bed locked in lowest position, call bell within reach. Will continue to monitor.

## 2018-10-16 NOTE — PROGRESS NOTES
Cardiac device interrogation and/or reprogramming completed by industry representative, Tosha Dunlap; please refer to report located in the media tab.

## 2018-10-17 NOTE — ASSESSMENT & PLAN NOTE
HMIII- DT implanted on 7/2017  INR goal 2-3.  Low flow alarms resolved  LVAD care per protocol  Continue coumadin 2 mg today. Home dose is 2.5mg daily  GI has no plan for Invasive imaging currently.  Change lisinopril back to 2.5mg once daily due to hypotension.      VAD Interrogation:  TXP LVAD INTERROGATIONS 10/17/2018 10/17/2018 10/16/2018 10/16/2018 10/16/2018 10/16/2018 10/16/2018   Type HeartMate3 HeartMate3 HeartMate3 HeartMate3 HeartMate3 HeartMate3 (No Data)   Flow 3.9 3.7 4.3 4.4 - 3.8 -   Speed 5200 5200 5200 5200 5200 5200 -   PI 4.8 5.5 2.9 2.4 - 5.0 -   Power ([place]) 3.5 3.5 3.6 3.5 - 3.6 -   LSL 4800 4800 4800 4800 4800 4800 -   Pulsatility - Intermittent pulse Intermittent pulse Intermittent pulse Intermittent pulse Intermittent pulse -   }

## 2018-10-17 NOTE — ASSESSMENT & PLAN NOTE
C/w amiodarone 400mg daily.  Patient was supposed to get ablation with Dr. Rodriguez.  Complaining of dizziness and blurry vision after taking mexiletine  Discontinued on 10/14 meclizine, no recurrence of symptoms.  Appreciate EP consult- DC meclizine, compression stockings, ICD with no events.  -RRT 10/13, ICD interrogated - no acute events

## 2018-10-17 NOTE — DISCHARGE SUMMARY
"[facility]  Heart Transplant  Discharge Summary      Patient Name: Suman Hayden  MRN: [id number]  Admission Date: 10/9/2018  Hospital Length of Stay: 8 days  Discharge Date and Time: 10/17/2018 2:37 PM  Attending Physician: Graciela Bautista MD   Discharging Provider: Alisha Roland MD  Primary Care Provider: Joe Ernst MD     HPI: The VAD coordinator on-call was paged by the [facility] communication center that the patient called 911 for a "red heart despite being connected to batteries or wall power". The VAD coordinator called spoke to the patient's wife. She reports he is feeling fine but is "having a red alarm that says to call hospital". His VAD numbers were 2.6 flow, speed 5200, 11.7 PI, and power 3.7. The LVAD pump running symbol was illuminated.  The patient and his wife have not been measuring BPs lately. His INR has been recently high > 5.0.  The VAD coordinator had his wife check the last 6 alarms since she kept reporting it was a "call hospital alarm". The last six alarms were checked, and it was noted her had low flow alarms. The VAD coordinator instructed the patient to allow EMS to assess the patient and transport if needed. The patient arrived in the ED around noon today for further assessment and was transferred to [facility].     * No surgery found *     Hospital Course: Patient had multiple episodes of hypotension, dizziness, diaphoresis. Held mexiletine.    10/15: EP consult. Discontinued mexilitine. Compression stockings. Plan to monitor overnight and during ambulation.        Significant Diagnostic Studies: Labs:   CMP   Recent Labs   Lab  10/16/18   0517  10/17/18   0444   NA  134*  133*   K  4.5  4.4   CL  101  104   CO2  27  23   GLU  87  80   BUN  25*  23   CREATININE  1.2  1.1   CALCIUM  9.4  9.0   PROT   --   6.4   ALBUMIN   --   3.1*   BILITOT   --   0.3   ALKPHOS   --   76   AST   --   26   ALT   --   20   ANIONGAP  6*  6*   ESTGFRAFRICA  >60.0  >60.0   EGFRNONAA  " >60.0  >60.0   , CBC   Recent Labs   Lab  10/16/18   0517  10/17/18   0444   WBC  5.73  5.58   HGB  9.5*  9.1*   HCT  30.2*  28.6*   PLT  206  199    and INR   Lab Results   Component Value Date    INR 1.8 (H) 10/17/2018    INR 2.0 (H) 10/16/2018    INR 2.1 (H) 10/15/2018     Microbiology:   Blood Culture   Lab Results   Component Value Date    LABBLOO No Growth to date 10/13/2018    LABBLOO No Growth to date 10/13/2018    LABBLOO No Growth to date 10/13/2018    LABBLOO No Growth to date 10/13/2018       Pending Diagnostic Studies:     None        Final Active Diagnoses:    Diagnosis Date Noted POA    PRINCIPAL PROBLEM:  GI bleed [K92.2] 03/14/2018 Yes    Iron deficiency anemia due to chronic blood loss [D50.0] 10/15/2018 Yes    Hypotension due to drugs [I95.2]  Yes    Anticoagulation monitoring, INR range 2-3 [Z79.01]  Not Applicable    Supratherapeutic INR [R79.1] 08/07/2018 Yes    Chronic anticoagulation [Z79.01] 09/22/2017 Not Applicable    Heart replaced by heart assist device [Z95.811]  Not Applicable    AICD (automatic cardioverter/defibrillator) present [Z95.810] 08/06/2017 Yes    LVAD (left ventricular assist device) present [Z95.811] 07/29/2017 Not Applicable    Atrial tachycardia [I47.1]  Yes     Chronic    Chronic combined systolic and diastolic congestive heart failure [I50.42] 07/07/2017 Yes    Hepatitis B core antibody positive since 2012 [R76.8] 07/07/2017 Yes    Essential hypertension [I10] 07/05/2017 Yes    VT (ventricular tachycardia) [I47.2] 07/05/2017 Yes    Nonischemic cardiomyopathy [I42.8] 06/25/2017 Yes      Problems Resolved During this Admission:      Discharged Condition: stable    Disposition: Home or Self Care    Follow Up:    Patient Instructions:      IRON AND TIBC   Standing Status: Future Standing Exp. Date: 12/16/19     FERRITIN   Standing Status: Future Standing Exp. Date: 12/16/19     TRANSFERRIN   Standing Status: Future Standing Exp. Date: 12/16/19      Protime-INR   Standing Status: Future Standing Exp. Date: 12/16/19     Diet Cardiac     Activity as tolerated     Medications:  Reconciled Home Medications:      Medication List      START taking these medications    ferrous sulfate 325 (65 FE) MG EC tablet  Take 1 tablet (325 mg total) by mouth 3 (three) times daily.     lisinopril 2.5 MG tablet  Commonly known as:  PRINIVIL,ZESTRIL  Take 1 tablet (2.5 mg total) by mouth once daily.     warfarin 2 MG tablet  Commonly known as:  COUMADIN  Take 3mg (1 & 1/2) tabs on 10/17 only, followed by 2mg (1 tab) orally daily thereafter.        CHANGE how you take these medications    amiodarone 200 MG Tab  Commonly known as:  PACERONE  Take 2 tablets (400 mg total) by mouth once daily.  What changed:  how much to take        CONTINUE taking these medications    dronabinol 5 MG capsule  Commonly known as:  MARINOL  TAKE ONE CAPSULE BY MOUTH 3 TIMES DAILY BEFORE MEALS     HYDROcodone-acetaminophen 5-325 mg per tablet  Commonly known as:  NORCO  Take 1 tablet by mouth every 6 (six) hours as needed for Pain.     magnesium oxide 400 mg (241.3 mg magnesium) tablet  Commonly known as:  MAG-OX  Take 1 tablet (400 mg total) by mouth once daily.     mirtazapine 7.5 MG Tab  Commonly known as:  REMERON  Take 1 tablet (7.5 mg total) by mouth nightly.     pantoprazole 40 MG tablet  Commonly known as:  PROTONIX  Take 1 tablet (40 mg total) by mouth once daily.     polyethylene glycol 17 gram Pwpk  Commonly known as:  GLYCOLAX  Take 17 g by mouth daily as needed (constipation).     pravastatin 20 MG tablet  Commonly known as:  PRAVACHOL  Take 1 tablet (20 mg total) by mouth every evening.     spironolactone 25 MG tablet  Commonly known as:  ALDACTONE  Take 1 tablet (25 mg total) by mouth once daily.        STOP taking these medications    furosemide 20 MG tablet  Commonly known as:  LASIX     mexiletine 200 MG Cap  Commonly known as:  MEXITIL          Case d/w Dr. Omega Brito  MD [place]  Heart Transplant  [facility]

## 2018-10-17 NOTE — PLAN OF CARE
Problem: Patient Care Overview  Goal: Plan of Care Review  Outcome: Ongoing (interventions implemented as appropriate)  Patient remains free from falls and injuries through out shift. Patient AAO and VSS. Pt dopplers and LVAD number WNL. Decrease amiodarone dose to 400mg Daily due to possible effects of having supra-therapeatic INR. INR 2.1 Will possibly  D/C in AM. Pt Patient denies chest pain and SOB. Patient's family at bedside. Plan of care reviewed with patient. Patient verbalizes understanding of plan.  Will continue to monitor.

## 2018-10-17 NOTE — ASSESSMENT & PLAN NOTE
- Held lasix.  - TTE 10/10/2018- LVEDD 5.5 cm, LVEF <10%, global, E/e'-18, HM III, RV normal, mild TR

## 2018-10-17 NOTE — PROGRESS NOTES
[facility]  Heart Transplant  Progress Note    Patient Name: Suman Hayden  MRN: [id number]  Admission Date: 10/9/2018  Hospital Length of Stay: 8 days  Attending Physician: Graciela Bautista MD  Primary Care Provider: Joe Ernst MD  Principal Problem:GI bleed    Subjective:     Interval History: Patient seen and examined today at bedside. No new complaints, able to ambulate without any symptoms of lightheadedness. Will get INR checked on Friday.     Continuous Infusions:  Scheduled Meds:   amiodarone  400 mg Oral Daily    dronabinol  5 mg Oral TID    ferrous sulfate  325 mg Oral TID    lisinopril  2.5 mg Oral Daily    magnesium oxide  400 mg Oral Daily    mirtazapine  7.5 mg Oral Nightly    pantoprazole  40 mg Oral Daily    pravastatin  20 mg Oral QHS    spironolactone  25 mg Oral Daily    warfarin  2 mg Oral Daily     PRN Meds:HYDROcodone-acetaminophen, polyethylene glycol    Review of patient's allergies indicates:   Allergen Reactions    Asa [aspirin] Other (See Comments)     Bleeding ulcer     Objective:     Vital Signs (Most Recent):  Temp: 98.2 °F (36.8 °C) (10/17/18 0806)  Pulse: 78 (10/17/18 0400)  Resp: 18 (10/17/18 0806)  BP: (!) 82/0 (10/17/18 0811)  SpO2: 100 % (10/17/18 0806) Vital Signs (24h Range):  Temp:  [98.2 °F (36.8 °C)-99.2 °F (37.3 °C)] 98.2 °F (36.8 °C)  Pulse:  [77-83] 78  Resp:  [17-18] 18  SpO2:  [94 %-100 %] 100 %  BP: (70-99)/(0-78) 82/0     Patient Vitals for the past 72 hrs (Last 3 readings):   Weight   10/17/18 0700 71.2 kg (156 lb 15.5 oz)   10/16/18 0800 69.7 kg (153 lb 10.6 oz)     Body mass index is 23.18 kg/m².      Intake/Output Summary (Last 24 hours) at 10/17/2018 0931  Last data filed at 10/17/2018 0815  Gross per 24 hour   Intake 1010 ml   Output 875 ml   Net 135 ml       Hemodynamic Parameters:       Telemetry: NSR with artifact    Physical Exam   Constitutional: He is oriented to person, place, and time. He appears well-developed and  well-nourished.   HENT:   Mouth/Throat: Oropharynx is clear and moist.   Neck: No JVD present.   Cardiovascular: Normal rate and regular rhythm.   VAD sounds heard.   Pulmonary/Chest: Effort normal and breath sounds normal. No respiratory distress. He has no wheezes. He has no rales.   Abdominal: Soft. Bowel sounds are normal. He exhibits no distension. There is no tenderness.   Musculoskeletal: He exhibits no edema.   Neurological: He is alert and oriented to person, place, and time.   Skin: Skin is warm.   Nursing note and vitals reviewed.      Significant Labs:  CBC:  Recent Labs   Lab  10/15/18   0600  10/16/18   0517  10/17/18   0444   WBC  5.72  5.73  5.58   RBC  4.04*  3.80*  3.62*   HGB  10.1*  9.5*  9.1*   HCT  32.2*  30.2*  28.6*   PLT  233  206  199   MCV  80*  80*  79*   MCH  25.0*  25.0*  25.1*   MCHC  31.4*  31.5*  31.8*     BNP:  Recent Labs   Lab  10/13/18   1816  10/15/18   0600  10/17/18   0444   BNP  224*  167*  200*     CMP:  Recent Labs   Lab  10/12/18   0644   10/15/18   0600  10/16/18   0517  10/17/18   0444   GLU  79   < >  85  87  80   CALCIUM  9.4   < >  9.3  9.4  9.0   ALBUMIN  3.3*   --   3.2*   --   3.1*   PROT  6.8   --   6.8   --   6.4   NA  136   < >  134*  134*  133*   K  4.2   < >  4.3  4.5  4.4   CO2  28   < >  25  27  23   CL  102   < >  100  101  104   BUN  18   < >  28*  25*  23   CREATININE  1.3   < >  1.4  1.2  1.1   ALKPHOS  75   --   72   --   76   ALT  25   --   20   --   20   AST  28   --   23   --   26   BILITOT  0.3   --   0.3   --   0.3    < > = values in this interval not displayed.      Coagulation:   Recent Labs   Lab  10/15/18   0600  10/16/18   0517  10/17/18   0444   INR  2.1*  2.0*  1.8*   APTT  29.4  28.6  27.0     LDH:  Recent Labs   Lab  10/15/18   0600  10/16/18   0517  10/17/18   0444   LDH  154  148  154     Microbiology:  Microbiology Results (last 7 days)     Procedure Component Value Units Date/Time    Blood culture [[id number]] Collected:  10/13/18 2007  "   Order Status:  Completed Specimen:  Blood Updated:  10/16/18 2212     Blood Culture, Routine No Growth to date     Blood Culture, Routine No Growth to date     Blood Culture, Routine No Growth to date     Blood Culture, Routine No Growth to date          BMP:   Recent Labs   Lab  10/17/18   0444   GLU  80   NA  133*   K  4.4   CL  104   CO2  23   BUN  23   CREATININE  1.1   CALCIUM  9.0   MG  2.5     Cardiac Markers: No results for input(s): CKMB, TROPONINT, MYOGLOBIN in the last 72 hours.  Coagulation:   Recent Labs   Lab  10/17/18   0444   INR  1.8*   APTT  27.0     I have reviewed all pertinent labs within the past 24 hours.    Estimated Creatinine Clearance: 64.3 mL/min (based on SCr of 1.1 mg/dL).    Diagnostic Results:  I have reviewed all pertinent imaging results/findings within the past 24 hours.    Assessment and Plan:     The VAD coordinator on-call was paged by the [facility] communication center that the patient called 911 for a "red heart despite being connected to batteries or wall power". The VAD coordinator called spoke to the patient's wife. She reports he is feeling fine but is "having a red alarm that says to call hospital". His VAD numbers were 2.6 flow, speed 5200, 11.7 PI, and power 3.7. The LVAD pump running symbol was illuminated.  The patient and his wife have not been measuring BPs lately. His INR has been recently high > 5.0.  The VAD coordinator had his wife check the last 6 alarms since she kept reporting it was a "call hospital alarm". The last six alarms were checked, and it was noted her had low flow alarms. The VAD coordinator instructed the patient to allow EMS to assess the patient and transport if needed. The patient arrived in the ED around noon today for further assessment and was transferred to [facility].     * GI bleed    -No evidence of acute blood though hemoccult positive with Hb 12.5 on 10/4  -> 10.1 -> 9.5 -> 9.1( 10/17)  -Watch H/H, no active intervention .  -Iron studies " from this year- TRACEE- start with FeSo4 325 mg TID, f/u iron studies as OP in 6 weeks.    Results for MIRTA LOPEZ (MRN [id number]) as of 10/10/2018 11:22   Ref. Range 10/4/2018 08:20 10/8/2018 10:05 10/9/2018 09:44 10/9/2018 13:32 10/10/2018 02:27   Hemoglobin Latest Ref Range: 14.0 - 18.0 g/dL 12.5 (L)  10.8 (L) 10.7 (L) 9.0 (L)   hemoccult Positive  HB trending up. No plan of endoscope        Iron deficiency anemia due to chronic blood loss    - s/w Ferrous sulfate 325 mg TID. Plan to repeat as OP in 6 weeks.        Anticoagulation monitoring, INR range 2-3    - INR 1.8, C/W coumadin. Patient HM3        Hypotension due to drugs    - Likely from Mexilitine, Multiple episodes since admission resolved with fluids, supine position and medication adjustment  - Monitor for recurrence        Supratherapeutic INR    possibly related to high dose of amiodarone recently started  Decrease amiodarone dose to 400mg Daily  Restart coumadin , INR 2.0 on coumadin 2 mg po daily          LVAD (left ventricular assist device) present    HMIII- DT implanted on 7/2017  INR goal 2-3.  Low flow alarms resolved  LVAD care per protocol  Continue coumadin 2 mg today. Home dose is 2.5mg daily  GI has no plan for Invasive imaging currently.  Change lisinopril back to 2.5mg once daily due to hypotension.      VAD Interrogation:  TXP LVAD INTERROGATIONS 10/17/2018 10/17/2018 10/16/2018 10/16/2018 10/16/2018 10/16/2018 10/16/2018   Type HeartMate3 HeartMate3 HeartMate3 HeartMate3 HeartMate3 HeartMate3 (No Data)   Flow 3.9 3.7 4.3 4.4 - 3.8 -   Speed 5200 5200 5200 5200 5200 5200 -   PI 4.8 5.5 2.9 2.4 - 5.0 -   Power ([place]) 3.5 3.5 3.6 3.5 - 3.6 -   LSL 4800 4800 4800 4800 4800 4800 -   Pulsatility - Intermittent pulse Intermittent pulse Intermittent pulse Intermittent pulse Intermittent pulse -   }                  Atrial tachycardia    - c/w amiodarone        Chronic combined systolic and diastolic congestive heart failure    - Euvolemic  on exam. Held lasix on 10/16/2018.        VT (ventricular tachycardia)    C/w amiodarone 400mg daily.  Patient was supposed to get ablation with Dr. Rodriguez.  Complaining of dizziness and blurry vision after taking mexiletine  Discontinued on 10/14 meclizine, no recurrence of symptoms.  Appreciate EP consult- DC meclizine, compression stockings, ICD with no events.  -RRT 10/13, ICD interrogated - no acute events          Essential hypertension    - Continue lisinopril 2.5mg daily and aldactone  - Monitor BP closely due to labile course          Nonischemic cardiomyopathy    - Held lasix.  - TTE 10/10/2018- LVEDD 5.5 cm, LVEF <10%, global, E/e'-18, HM III, RV normal, mild TR            Alisha Roland MD  Heart Transplant  [facility]

## 2018-10-17 NOTE — PROGRESS NOTES
DISCHARGE    SW to pt's room for discharge today.  Pt presents as sitting up at edge of bed with wife Kia in room.  Pt and wife both present as aao x4, calm, cooperative, and asking and answering questions appropriately.  Pt and wife verbalize understanding and agreement with plan for d/c to home today.  Pt reports wife will transport him home today.  Pt denies any d/c needs, and none identified by medical team.  Pt reports coping adequately at this time, and denies any needs or concerns to SW.  SW providing ongoing psychosocial and counseling support, education, resources, assistance, and discharge planning as indicated.  SW remains available.

## 2018-10-17 NOTE — SUBJECTIVE & OBJECTIVE
Interval History: Patient seen and examined today at bedside. No new complaints, able to ambulate without any symptoms of lightheadedness. Will get INR checked on Friday.     Continuous Infusions:  Scheduled Meds:   amiodarone  400 mg Oral Daily    dronabinol  5 mg Oral TID    ferrous sulfate  325 mg Oral TID    lisinopril  2.5 mg Oral Daily    magnesium oxide  400 mg Oral Daily    mirtazapine  7.5 mg Oral Nightly    pantoprazole  40 mg Oral Daily    pravastatin  20 mg Oral QHS    spironolactone  25 mg Oral Daily    warfarin  2 mg Oral Daily     PRN Meds:HYDROcodone-acetaminophen, polyethylene glycol    Review of patient's allergies indicates:   Allergen Reactions    Asa [aspirin] Other (See Comments)     Bleeding ulcer     Objective:     Vital Signs (Most Recent):  Temp: 98.2 °F (36.8 °C) (10/17/18 0806)  Pulse: 78 (10/17/18 0400)  Resp: 18 (10/17/18 0806)  BP: (!) 82/0 (10/17/18 0811)  SpO2: 100 % (10/17/18 0806) Vital Signs (24h Range):  Temp:  [98.2 °F (36.8 °C)-99.2 °F (37.3 °C)] 98.2 °F (36.8 °C)  Pulse:  [77-83] 78  Resp:  [17-18] 18  SpO2:  [94 %-100 %] 100 %  BP: (70-99)/(0-78) 82/0     Patient Vitals for the past 72 hrs (Last 3 readings):   Weight   10/17/18 0700 71.2 kg (156 lb 15.5 oz)   10/16/18 0800 69.7 kg (153 lb 10.6 oz)     Body mass index is 23.18 kg/m².      Intake/Output Summary (Last 24 hours) at 10/17/2018 0931  Last data filed at 10/17/2018 0815  Gross per 24 hour   Intake 1010 ml   Output 875 ml   Net 135 ml       Hemodynamic Parameters:       Telemetry: NSR with artifact    Physical Exam   Constitutional: He is oriented to person, place, and time. He appears well-developed and well-nourished.   HENT:   Mouth/Throat: Oropharynx is clear and moist.   Neck: No JVD present.   Cardiovascular: Normal rate and regular rhythm.   VAD sounds heard.   Pulmonary/Chest: Effort normal and breath sounds normal. No respiratory distress. He has no wheezes. He has no rales.   Abdominal: Soft.  Bowel sounds are normal. He exhibits no distension. There is no tenderness.   Musculoskeletal: He exhibits no edema.   Neurological: He is alert and oriented to person, place, and time.   Skin: Skin is warm.   Nursing note and vitals reviewed.      Significant Labs:  CBC:  Recent Labs   Lab  10/15/18   0600  10/16/18   0517  10/17/18   0444   WBC  5.72  5.73  5.58   RBC  4.04*  3.80*  3.62*   HGB  10.1*  9.5*  9.1*   HCT  32.2*  30.2*  28.6*   PLT  233  206  199   MCV  80*  80*  79*   MCH  25.0*  25.0*  25.1*   MCHC  31.4*  31.5*  31.8*     BNP:  Recent Labs   Lab  10/13/18   1816  10/15/18   0600  10/17/18   0444   BNP  224*  167*  200*     CMP:  Recent Labs   Lab  10/12/18   0644   10/15/18   0600  10/16/18   0517  10/17/18   0444   GLU  79   < >  85  87  80   CALCIUM  9.4   < >  9.3  9.4  9.0   ALBUMIN  3.3*   --   3.2*   --   3.1*   PROT  6.8   --   6.8   --   6.4   NA  136   < >  134*  134*  133*   K  4.2   < >  4.3  4.5  4.4   CO2  28   < >  25  27  23   CL  102   < >  100  101  104   BUN  18   < >  28*  25*  23   CREATININE  1.3   < >  1.4  1.2  1.1   ALKPHOS  75   --   72   --   76   ALT  25   --   20   --   20   AST  28   --   23   --   26   BILITOT  0.3   --   0.3   --   0.3    < > = values in this interval not displayed.      Coagulation:   Recent Labs   Lab  10/15/18   0600  10/16/18   0517  10/17/18   0444   INR  2.1*  2.0*  1.8*   APTT  29.4  28.6  27.0     LDH:  Recent Labs   Lab  10/15/18   0600  10/16/18   0517  10/17/18   0444   LDH  154  148  154     Microbiology:  Microbiology Results (last 7 days)     Procedure Component Value Units Date/Time    Blood culture [[id number]] Collected:  10/13/18 2007    Order Status:  Completed Specimen:  Blood Updated:  10/16/18 2212     Blood Culture, Routine No Growth to date     Blood Culture, Routine No Growth to date     Blood Culture, Routine No Growth to date     Blood Culture, Routine No Growth to date          BMP:   Recent Labs   Lab  10/17/18   3408    GLU  80   NA  133*   K  4.4   CL  104   CO2  23   BUN  23   CREATININE  1.1   CALCIUM  9.0   MG  2.5     Cardiac Markers: No results for input(s): CKMB, TROPONINT, MYOGLOBIN in the last 72 hours.  Coagulation:   Recent Labs   Lab  10/17/18   0444   INR  1.8*   APTT  27.0     I have reviewed all pertinent labs within the past 24 hours.    Estimated Creatinine Clearance: 64.3 mL/min (based on SCr of 1.1 mg/dL).    Diagnostic Results:  I have reviewed all pertinent imaging results/findings within the past 24 hours.

## 2018-10-17 NOTE — PROGRESS NOTES
DISCHARGE NOTE:    Suman Hayden is a 68 y.o. male s/p HM3 LVAD admitted for LVAD alarms and low hemoglobin. His post vad history is significant for GIB and VT.     Past Medical History:   Diagnosis Date    Arthritis     Cardiomyopathy     CHF (congestive heart failure)     Coronary artery disease     Encounter for blood transfusion     Gastric polyp     Hyperlipidemia     Hypertension     Hypotension, iatrogenic     Iron deficiency anemia due to chronic blood loss 10/15/2018    LVAD (left ventricular assist device) present     Obesity     S/P implantation of automatic cardioverter/defibrillator (AICD)     Ventricular tachycardia        Hospital Course: Hemoglobin remained stable throughout hospital stay, so no GI interventions were done. Warfarin was held for INR >6, and readjusted at discharge. Low flow alarms resolved during admit. Mexiletine was discontinued due to possible AE including vision changes and dizziness. Amiodarone was reduced to 400mg/day.     Allergies:   Review of patient's allergies indicates:   Allergen Reactions    Asa [aspirin] Other (See Comments)     Bleeding ulcer       Pharmacy Interventions/Recommendations:  1) INR Goal: 2-3    2) Antiplatelet Agents: none    3) Heparin Bridging:  No gtt.    4) Patient Counseling/Education:  Medication discharge counseling provided to the patient by pharmacy resident.     5) INR Follow-Up/Discharge Needs: Due to supratherapeutic INR on admit, but low INR on discharge, will lightly boost warfarin dose to 3mg tonight, followed by a new dose of 2mg/day, using 2mg tablets (new strength). Repeat INR on Friday or Monday. Of note, amiodarone decreased to 400mg/day.     See list of discharge medication for dosing instructions.     Suman Hayden and his caregiver verbalized their understanding and had the opportunity to ask questions.      Discharge Medications:   Suman Hayden   Home Medication Instructions MYRANDA:[id number]    Printed  on:10/17/18 1315   Medication Information                      amiodarone (PACERONE) 200 MG Tab  Take 2 tablets (400 mg total) by mouth once daily.             dronabinol (MARINOL) 5 MG capsule  TAKE ONE CAPSULE BY MOUTH 3 TIMES DAILY BEFORE MEALS             ferrous sulfate 325 (65 FE) MG EC tablet  Take 1 tablet (325 mg total) by mouth 3 (three) times daily.             HYDROcodone-acetaminophen (NORCO) 5-325 mg per tablet  Take 1 tablet by mouth every 6 (six) hours as needed for Pain.             lisinopril (PRINIVIL,ZESTRIL) 2.5 MG tablet  Take 1 tablet (2.5 mg total) by mouth once daily.             magnesium oxide (MAG-OX) 400 mg tablet  Take 1 tablet (400 mg total) by mouth once daily.             mirtazapine (REMERON) 7.5 MG Tab  Take 1 tablet (7.5 mg total) by mouth nightly.             pantoprazole (PROTONIX) 40 MG tablet  Take 1 tablet (40 mg total) by mouth once daily.             polyethylene glycol (GLYCOLAX) 17 gram PwPk  Take 17 g by mouth daily as needed (constipation).             pravastatin (PRAVACHOL) 20 MG tablet  Take 1 tablet (20 mg total) by mouth every evening.             spironolactone (ALDACTONE) 25 MG tablet  Take 1 tablet (25 mg total) by mouth once daily.             warfarin (COUMADIN) 2 MG tablet  Take 3mg (1 & 1/2) tabs on 10/17 only, followed by 2mg (1 tab) orally daily thereafter.

## 2018-10-17 NOTE — NURSING
Patient given discharge instructions. Reviewed with patient and wife. Instructions given to patient. Verbalized understanding. IV and tele  removed from patient. Patient taken down in wheelchair by wife.

## 2018-10-17 NOTE — ASSESSMENT & PLAN NOTE
-No evidence of acute blood though hemoccult positive with Hb 12.5 on 10/4  -> 10.1 -> 9.5 -> 9.1( 10/17)  -Watch H/H, no active intervention .  -Iron studies from this year- [facility]- start with FeSo4 325 mg TID, f/u iron studies as OP in 6 weeks.    Results for MIRTA LOPEZ (MRN [id number]) as of 10/10/2018 11:22   Ref. Range 10/4/2018 08:20 10/8/2018 10:05 10/9/2018 09:44 10/9/2018 13:32 10/10/2018 02:27   Hemoglobin Latest Ref Range: 14.0 - 18.0 g/dL 12.5 (L)  10.8 (L) 10.7 (L) 9.0 (L)   hemoccult Positive  HB trending up. No plan of endoscope

## 2018-10-17 NOTE — ASSESSMENT & PLAN NOTE
- INR 1.8, C/W coumadin 3 today and 2 mg at home. Patient HM3 with no bridge  -Pt came with supratherapeutic INR, likely in the setting of amiodarone increased to 600 mg after VT. Patient dose reduced to 400mg in hospital.

## 2018-10-17 NOTE — ASSESSMENT & PLAN NOTE
-No evidence of acute blood though hemoccult positive with Hb 12.5 on 10/4  -> 10.1 -> 9.5 -> 9.1( 10/17)  -Watch H/H, no active intervention .  -Iron studies from this year- [facility]- start with FeSo4 325 mg TID, f/u iron studies as OP in 6 weeks.    Results for MIRTA LOPEZ (MRN [id number]) as of 10/10/2018 11:22   Ref. Range 10/4/2018 08:20 10/8/2018 10:05 10/9/2018 09:44 10/9/2018 13:32 10/10/2018 02:27   Hemoglobin Latest Ref Range: 14.0 - 18.0 g/dL 12.5 (L)  10.8 (L) 10.7 (L) 9.0 (L)   hemoccult Positive  HB trending up. No plan of endoscope

## 2018-10-17 NOTE — ASSESSMENT & PLAN NOTE
possibly related to high dose of amiodarone recently started  Decreased amiodarone dose to 400mg Daily  Restart coumadin , INR 1.8 on coumadin 2 mg po daily

## 2018-10-17 NOTE — ASSESSMENT & PLAN NOTE
HMIII- DT implanted on 7/2017  INR goal 2-3.  Low flow alarms resolved  LVAD care per protocol  Continue coumadin 2 mg today. Home dose is 2.5mg daily  GI has no plan for Invasive imaging currently.  Change lisinopril back to 2.5mg once daily due to hypotension.      VAD Interrogation:  TXP LVAD INTERROGATIONS 10/17/2018 10/17/2018 10/17/2018 10/16/2018 10/16/2018 10/16/2018 10/16/2018   Type HeartMate3 HeartMate3 HeartMate3 HeartMate3 HeartMate3 HeartMate3 HeartMate3   Flow 3.8 3.9 3.7 4.3 4.4 - 3.8   Speed 5200 5200 5200 5200 5200 5200 5200   PI 3.4 4.8 5.5 2.9 2.4 - 5.0   Power ([place]) 3.6 3.5 3.5 3.6 3.5 - 3.6   LSL - 4800 4800 4800 4800 4800 4800   Pulsatility - - Intermittent pulse Intermittent pulse Intermittent pulse Intermittent pulse Intermittent pulse   }

## 2018-10-17 NOTE — PROGRESS NOTES
Called Dr. Chavarria regarding pt INR being 1.8 this morning, which is down from yesterday of 2.1. Pt INR goal is 2-3 no orders given at this time. Will continue to monitor.

## 2018-10-18 NOTE — PROGRESS NOTES
Per note: Suman Hayden is a 68 y.o. male s/p HM3 LVAD admitted for LVAD alarms and low hemoglobin. His post vad history is significant for GIB and VT.  Hemoglobin remained stable throughout hospital stay, so no GI interventions were done. Warfarin was held for INR >6, and readjusted at discharge. Low flow alarms resolved during admit. Mexiletine was discontinued due to possible AE including vision changes and dizziness. Amiodarone was reduced to 400mg/day.   Due to supratherapeutic INR on admit, but low INR on discharge, will lightly boost warfarin dose to 3mg tonight, followed by a new dose of 2mg/day, using 2mg tablets (new strength). Repeat INR on Friday or Monday. Of note, amiodarone decreased to 400mg/day.

## 2018-10-22 NOTE — PHYSICIAN QUERY
PT Name: Suman Hayden  MR #: [id number]     Physician Query Form - Diagnosis Clarification      CDS/: Flora Michelle               Contact information: [email or web address]      This form is a permanent document in the medical record.     Query Date: October 22, 2018    By submitting this query, we are merely seeking further clarification of documentation.  Please utilize your independent clinical judgment when addressing the question(s) below.     The medical record contains the following:      Findings Supporting Clinical Information Location in Medical Record   Hypotension due to drugs   Hypotension due to drugs    - Likely from Mexilitine, Multiple episodes since admission resolved with fluids, supine position and medication adjustment  - Monitor for recurrence    Complaining of dizziness and blurry vision after taking mexiletine  Discontinued on 10/14 meclizine, no recurrence of symptoms.  Appreciate EP consult- DC meclizine, compression stockings, ICD with no events.  -RRT 10/13, ICD interrogated - no acute events   HTS progress note 10/17      Please clarify if the _Hypotension due to drugs_ diagnosis has been:    [  ] Ruled In  [ x ] Ruled In, Now Resolved  [  ] Ruled Out  [  ] Clinically insignificant  [  ] Clinically undetermined  [  ] Other/Clarification of findings (please specify)_______________________________    Please document in your progress notes daily for the duration of treatment, until resolved, and include in your discharge summary.

## 2018-10-24 NOTE — PROGRESS NOTES
ABLATION EDUCATION CHECKLIST     PRE-PROCEDURE TESTIN-5-18 @ 9:50 AM  Pre-Procedure labs have been ordered for you at:  [facility]   · Be sure to arrive at your scheduled time. YOU DO NOT HAVE TO FAST FOR THIS LAB WORK!     DAY OF PROCEDURE:     18 @ 7 AM  Report to Cardiology Waiting Room on 3rd floor of the Hospital    · Do not eat or drink anything after: 12 mn on the night before your procedure  · Please do not wear makeup (especially mascara) when arriving for your procedure     Medications:   CONTINUE TO TAKE COUMADIN  · You may take your other usual morning medications with a sip of water        Directions to the Cardiology Waiting Room  Directions for Reporting to [facility]  If you park in the Parking Garage:  Take elevators to the 2nd floor  Walk up ramp and turn right by [address] Elevators  Walk long mckeon around to front of hospital to area with windows overlooking [facility]  Check in at Reception Desk        · You will be spending the night after your procedure.  · You will need someone to drive you home the day after your procedure.  · Your pain during your procedure will be managed by the anesthesia team.      Any need to reschedule or cancel procedures, or any questions regarding your procedures should be addressed directly with the Arrhythmia Department Nurses at the following phone number: [phone]

## 2018-10-24 NOTE — ED NOTES
Spoke with Amy in [facility], Dr. Ambrosio is accepting the patient, the [facility] will call back one patient has a bed assigned.

## 2018-10-24 NOTE — TELEPHONE ENCOUNTER
Spoke with  provided potential date for EP procedure. Patient's spouse in agreement. Will sent instructions via mail.     Alyssa GASTELUM CCM

## 2018-10-24 NOTE — ED PROVIDER NOTES
SCRIBE #1 NOTE: I, Liz Chavarria, am scribing for, and in the presence of, To Lee MD. I have scribed the HPI, ROS, and PEx.         History      Chief Complaint   Patient presents with    Chest Pain     LVAD pt, cp onset 1 hour ago       Review of patient's allergies indicates:   Allergen Reactions    Asa [aspirin] Other (See Comments)     Bleeding ulcer        HPI   HPI    10/24/2018, 2:37 PM   History obtained from the patient and wife      History of Present Illness: Suman Hayden is a 68 y.o. male patient who presents to the Emergency Department for R-sided CP which onset gradually 4 hours PTA. Pt describes the pain as sharpSymptoms are constant and moderate in severity. No mitigating or exacerbating factors reported. Associated sxs include chills, subjective fever, cough, rhinorrhea, nausea, 2 episodes of emesis, SOB, and diaphoresis. Per the wife, pt has had episodes where he would go into a daze and not respond to his wife. Patient denies any abd pain, leg swelling, palpitations, dysuria, hematuria, dizziness, HA, weakness, numbness, LOC, and all other sxs at this time. Pt has an LVAD that was place 07/27/2017. No further complaints or concerns at this time.       Arrival mode: AASI    PCP: Joe Ernst MD       Past Medical History:  Past Medical History:   Diagnosis Date    Arthritis     Cardiomyopathy     CHF (congestive heart failure)     Coronary artery disease     Encounter for blood transfusion     Gastric polyp     Hyperlipidemia     Hypertension     Hypotension, iatrogenic     Iron deficiency anemia due to chronic blood loss 10/15/2018    LVAD (left ventricular assist device) present     Obesity     S/P implantation of automatic cardioverter/defibrillator (AICD)     Ventricular tachycardia        Past Surgical History:  Past Surgical History:   Procedure Laterality Date    ABDOMINAL SURGERY      APPENDECTOMY      CARDIAC CATHETERIZATION      CARDIAC  DEFIBRILLATOR PLACEMENT      CARDIAC DEFIBRILLATOR PLACEMENT      CLOSURE-STERNAL WOUND N/A 7/28/2017    Performed by Sunny Downing MD at [facility] OR 2ND FLR    COLONOSCOPY      COLONOSCOPY N/A 3/9/2018    Procedure: COLONOSCOPY;  Surgeon: Andres Chatterjee MD;  Location: [facility] (2ND FLR);  Service: Endoscopy;  Laterality: N/A;    COLONOSCOPY N/A 8/8/2018    Procedure: COLONOSCOPY;  Surgeon: Andres Chatterjee MD;  Location: [facility] (2ND FLR);  Service: Endoscopy;  Laterality: N/A;    COLONOSCOPY N/A 8/8/2018    Performed by Andres Chatterjee MD at [facility] ENDO (2ND FLR)    COLONOSCOPY N/A 3/9/2018    Performed by Andres Chatterjee MD at [facility] (2ND FLR)    DEVICE ASSISTED ENTEROSCOPY-ANTEROGRADE N/A 1/25/2018    Performed by Andres Chatterjee MD at [facility] (2ND FLR)    DEVICE ASSISTED ENTEROSCOPY-ANTEROGRADE N/A 12/14/2017    Performed by Andres Chatterjee MD at [facility] (2ND FLR)    EGD (ESOPHAGOGASTRODUODENOSCOPY) N/A 8/8/2018    Performed by Andres Chatterjee MD at [facility] (2ND FLR)    ESOPHAGOGASTRODUODENOSCOPY      ESOPHAGOGASTRODUODENOSCOPY N/A 8/8/2018    Procedure: EGD (ESOPHAGOGASTRODUODENOSCOPY);  Surgeon: Andres Chatterjee MD;  Location: [facility] (2ND FLR);  Service: Endoscopy;  Laterality: N/A;    ESOPHAGOGASTRODUODENOSCOPY (EGD) N/A 3/6/2018    Performed by Andres Chatterjee MD at [facility] ENDO (2ND FLR)    ESOPHAGOGASTRODUODENOSCOPY (EGD) N/A 12/8/2017    Performed by Gagan Barger MD at [facility] (2ND FLR)    ESOPHAGOGASTRODUODENOSCOPY (EGD) N/A 8/17/2017    Performed by Kishore Mills MD at [facility] (2ND FLR)    EXPLORATORY-LAPAROTOMY with small bowel resection  N/A 3/13/2018    Performed by Kenyon Tellez MD at [facility] OR 2ND FLR    HEART CATH-RIGHT Right 7/3/2017    Performed by Angie Torres MD at [facility] CATH LAB    INSERTION-LEFT VENTRICULAR ASSIST DEVICE N/A 7/27/2017    Performed by Sunny Downing MD at [facility] OR 2ND FLR    INSERTION-LEFT VENTRICULAR ASSIST DEVICE N/A  7/25/2017    Performed by Sunny Downing MD at [facility] OR 2ND FLR    LEFT VENTRICULAR ASSIST DEVICE  07/27/2017    PLACEMENT-NEOPERICARDIUM N/A 7/28/2017    Performed by Sunny Downing MD at [facility] OR 2ND FLR    RESECTION-BOWEL  3/13/2018    Performed by Kenyon Tellez MD at [facility] OR 2ND FLR    Retrograde deivce assisted enteroscopy N/A 1/26/2018    Performed by Jesse Davis MD at [facility] ENDO (2ND FLR)    REVISION-LEAD-ICD N/A 11/20/2017    Performed by Rubén Winchester MD at [facility] CATH LAB    TONSILLECTOMY      TRANSESOPHAGEAL ECHOCARDIOGRAM (GLENN) N/A 1/9/2018    Performed by [facility] Diagnostic Provider at [facility] CATH LAB         Family History:  Family History   Problem Relation Age of Onset    Heart disease Mother     Heart disease Father        Social History:  Social History     Tobacco Use    Smoking status: Never Smoker    Smokeless tobacco: Never Used   Substance and Sexual Activity    Alcohol use: No    Drug use: No    Sexual activity: Not Currently       ROS   Review of Systems   Constitutional: Positive for chills, diaphoresis and fever (Subjective). Negative for fatigue and unexpected weight change.   HENT: Positive for rhinorrhea. Negative for congestion and sore throat.    Respiratory: Positive for cough (Productive) and shortness of breath. Negative for choking and wheezing.    Cardiovascular: Positive for chest pain (R-sided). Negative for palpitations and leg swelling.   Gastrointestinal: Positive for nausea and vomiting. Negative for abdominal pain and diarrhea.   Genitourinary: Negative for dysuria, frequency and hematuria.   Musculoskeletal: Negative for back pain and neck pain.   Skin: Negative for rash.   Neurological: Negative for dizziness, syncope, weakness, numbness and headaches.   Hematological: Does not bruise/bleed easily.   Psychiatric/Behavioral:        (+) episodes of daze   All other systems reviewed and are negative.      Physical Exam      Initial Vitals [10/24/18  1402]   BP Pulse Resp Temp SpO2   (S) (!) 66/43 90 18 97.6 °F (36.4 °C) 98 %      MAP       --          Physical Exam  Nursing Notes and Vital Signs Reviewed.  Constitutional: Patient is in no acute distress. Well-developed and well-nourished.  Head: Atraumatic. Normocephalic.  Eyes: PERRL. EOM intact. Conjunctivae are not pale. No scleral icterus.  ENT: Mucous membranes are moist. Oropharynx is clear and symmetric.    Neck: Supple. Full ROM.   Cardiovascular: Normal hum and thrill of LVAD with intact accessory pack.  Pulmonary/Chest: No respiratory distress. Clear to auscultation bilaterally. No wheezing or rales.  Abdominal: Soft and non-distended.  There is no tenderness.  No rebound, guarding, or rigidity. tenderness  Musculoskeletal: Moves all extremities. No obvious deformities. No edema.  Skin: Pale and dry.  Neurological:  Alert, awake, and appropriate.  Normal speech.  No acute focal neurological deficits are appreciated.  Psychiatric: Normal affect. Good eye contact. Appropriate in content.    ED Course    Critical Care  Date/Time: 10/24/2018 7:04 PM  Performed by: To Lee MD  Authorized by: To Lee MD   Total critical care time (exclusive of procedural time) : 30 minutes  Critical care was necessary to treat or prevent imminent or life-threatening deterioration of the following conditions: shock (severe anemia).  Critical care was time spent personally by me on the following activities: discussions with primary provider, blood draw for specimens, development of treatment plan with patient or surrogate, discussions with consultants, evaluation of patient's response to treatment, examination of patient, interpretation of cardiac output measurements, obtaining history from patient or surrogate, ordering and performing treatments and interventions, ordering and review of laboratory studies, ordering and review of radiographic studies, pulse oximetry, re-evaluation of patient's  "condition and review of old charts.        ED Vital Signs:  Vitals:    10/24/18 1402 10/24/18 1422 10/24/18 1438 10/24/18 1446   BP: (S) (!) 66/43   (!) 77/61   Pulse: 90   80   Resp: 18      Temp: 97.6 °F (36.4 °C)      TempSrc: Oral      SpO2: 98%   100%   Weight:  74.1 kg (163 lb 6.4 oz) 74.6 kg (164 lb 6.4 oz)    Height: 5' 9" (1.753 m)       10/24/18 1500   BP:    Pulse:    Resp:    Temp: 97.7 °F (36.5 °C)   TempSrc:    SpO2:    Weight:    Height:        Abnormal Lab Results:  Labs Reviewed   CBC W/ AUTO DIFFERENTIAL - Abnormal; Notable for the following components:       Result Value    WBC 14.46 (*)     RBC 1.69 (*)     Hemoglobin 4.2 (*)     Hematocrit 14.2 (*)     MCH 24.9 (*)     MCHC 29.6 (*)     RDW 23.2 (*)     Gran # (ANC) 11.8 (*)     Mono # 1.1 (*)     Gran% 82.1 (*)     Lymph% 10.6 (*)     All other components within normal limits    Narrative:       Hgb & Hct critical result(s) called and verbal readback obtained   from Albina Parmar RN , 10/24/2018 15:37   COMPREHENSIVE METABOLIC PANEL - Abnormal; Notable for the following components:    CO2 13 (*)     Glucose 133 (*)     BUN, Bld 47 (*)     Creatinine 1.5 (*)     Calcium 8.6 (*)     Total Protein 5.7 (*)     Albumin 3.1 (*)     Alkaline Phosphatase 39 (*)     eGFR if  55 (*)     eGFR if non  47 (*)     All other components within normal limits   TROPONIN I - Abnormal; Notable for the following components:    Troponin I 0.032 (*)     All other components within normal limits   B-TYPE NATRIURETIC PEPTIDE - Abnormal; Notable for the following components:     (*)     All other components within normal limits   TYPE & SCREEN   PREPARE RBC SOFT        All Lab Results:  Results for orders placed or performed during the hospital encounter of 10/24/18   CBC auto differential   Result Value Ref Range    WBC 14.46 (H) 3.90 - 12.70 K/uL    RBC 1.69 (L) 4.60 - 6.20 M/uL    Hemoglobin 4.2 (LL) 14.0 - 18.0 g/dL    " Hematocrit 14.2 (LL) 40.0 - 54.0 %    MCV 84 82 - 98 fL    MCH 24.9 (L) 27.0 - 31.0 pg    MCHC 29.6 (L) 32.0 - 36.0 g/dL    RDW 23.2 (H) 11.5 - 14.5 %    Platelets 163 150 - 350 K/uL    MPV 10.0 9.2 - 12.9 fL    Gran # (ANC) 11.8 (H) 1.8 - 7.7 K/uL    Lymph # 1.5 1.0 - 4.8 K/uL    Mono # 1.1 (H) 0.3 - 1.0 K/uL    Eos # 0.0 0.0 - 0.5 K/uL    Baso # 0.03 0.00 - 0.20 K/uL    Gran% 82.1 (H) 38.0 - 73.0 %    Lymph% 10.6 (L) 18.0 - 48.0 %    Mono% 7.3 4.0 - 15.0 %    Eosinophil% 0.3 0.0 - 8.0 %    Basophil% 0.2 0.0 - 1.9 %    Platelet Estimate Appears normal     Aniso Moderate     Poik Slight     Poly Occasional     Hypo Moderate     Ovalocytes Occasional     Target Cells Occasional     Tear Drop Cells Occasional     Rossi Cells Occasional     Stomatocytes Present     Spherocytes Occasional     Acanthocytes Present     Schistocytes Present     Differential Method Automated    Comprehensive metabolic panel   Result Value Ref Range    Sodium 137 136 - 145 mmol/L    Potassium 4.2 3.5 - 5.1 mmol/L    Chloride 109 95 - 110 mmol/L    CO2 13 (L) 23 - 29 mmol/L    Glucose 133 (H) 70 - 110 mg/dL    BUN, Bld 47 (H) 8 - 23 mg/dL    Creatinine 1.5 (H) 0.5 - 1.4 mg/dL    Calcium 8.6 (L) 8.7 - 10.5 mg/dL    Total Protein 5.7 (L) 6.0 - 8.4 g/dL    Albumin 3.1 (L) 3.5 - 5.2 g/dL    Total Bilirubin 0.4 0.1 - 1.0 mg/dL    Alkaline Phosphatase 39 (L) 55 - 135 U/L    AST 26 10 - 40 U/L    ALT 14 10 - 44 U/L    Anion Gap 15 8 - 16 mmol/L    eGFR if African American 55 (A) >60 mL/min/1.73 m^2    eGFR if non African American 47 (A) >60 mL/min/1.73 m^2   Troponin I #1   Result Value Ref Range    Troponin I 0.032 (H) 0.000 - 0.026 ng/mL   B-Type natriuretic peptide (BNP)   Result Value Ref Range     (H) 0 - 99 pg/mL   Type & Screen   Result Value Ref Range    Group & Rh O POS     Indirect Maria Teresa NEG    Prepare RBC 2 Units; symptomatic anemia   Result Value Ref Range    UNIT NUMBER [id number]     PRODUCT CODE I5993O30     DISPENSE STATUS  CROSSMATCHED     CODING SYSTEM [id number]     Unit Blood Type Code 5100     Unit Blood Type O POS     Unit Expiration [id number]     UNIT NUMBER [id number]     PRODUCT CODE G8481L65     DISPENSE STATUS CROSSMATCHED     CODING SYSTEM [id number]     Unit Blood Type Code 5100     Unit Blood Type O POS     Unit Expiration [id number]        Imaging Results:  Imaging Results          X-Ray Chest AP Portable (Final result)  Result time 10/24/18 15:21:29    Final result by Avery Orellana MD (10/24/18 15:21:29)                 Impression:      Stable chest x-ray as above.  No acute findings.      Electronically signed by: Avery Orellana MD  Date:    10/24/2018  Time:    15:21             Narrative:    EXAMINATION:  XR CHEST AP PORTABLE    CLINICAL HISTORY:  Congestive heart failure., Chest Pain;    COMPARISON:  10/13/2018    FINDINGS:  No change.  Sternal wires.  Left ventricular assist device.  The pacemaker/defibrillator.    Normal heart size with clear lungs.                                        The EKG was ordered, reviewed, and independently interpreted by the ED provider.  Interpretation time: 14:28  Rate: 80 BPM  Rhythm: Wide QRS rhythm  Interpretation: LAD. Nonspecific intraventricular block. T wave abnormality. No STEMI.      The Emergency Provider reviewed the vital signs and test results, which are outlined above.    ED Discussion     3:28 PM: Consult with Dr. Ambrosio (LVAD team) who recommends pt f/u in one week if pt's HR and Hgb are normal. If pt is tachycardic and/or has a low Hb, pt will require trasfer.    4:09 PM: Consult with Dr. Ambrosio (LVAD team) at [facility] concerning pt. There are no LVAD services, which the patient requires, offered at [facility] at this time. Dr. Ambrosio expresses understanding and will accept transfer for LVAD services.  Accepting Facility: [facility]  Accepting Physician: Dr. Ambrosio    4:09 PM: Re-evaluated pt. Informed pt and family that there are no  LVAD services available at this location. I have discussed test results, shared treatment plan, and the need for transfer with patient and family at bedside. All historical, clinical, radiographic, and laboratory findings were reviewed with the patient/family in detail. Patient will be transferred by [facility] services with cardiac monitoring required en route. Patient understands that there could be unforeseen motor vehicle accidents or loss of vital signs that could result in potential death or permanent disability. Pt and family express understanding at this time and agree with all information. All questions answered. Pt and family have no further questions or concerns at this time. Pt is ready for transfer.     ED Medication(s):  Medications   0.9%  NaCl infusion (for blood administration) (not administered)   ondansetron injection 4 mg (4 mg Intravenous Given 10/24/18 4496)             Medical Decision Making    Medical Decision Making:   Clinical Tests:   Lab Tests: Reviewed and Ordered  Radiological Study: Reviewed and Ordered  Medical Tests: Reviewed and Ordered           Scribe Attestation:   Scribe #1: I performed the above scribed service and the documentation accurately describes the services I performed. I attest to the accuracy of the note.    Attending:   Physician Attestation Statement for Scribe #1: I, To Lee MD, personally performed the services described in this documentation, as scribed by Liz Chavarria, in my presence, and it is both accurate and complete.          Clinical Impression       ICD-10-CM ICD-9-CM   1. Symptomatic anemia D64.9 285.9   2. Chest pain R07.9 786.50   3. Gastrointestinal hemorrhage, unspecified gastrointestinal hemorrhage type K92.2 578.9       Disposition:   Disposition: Transferred  Condition: Serious         To Lee MD  10/24/18 3425

## 2018-10-25 PROBLEM — N17.9 AKI (ACUTE KIDNEY INJURY): Status: ACTIVE | Noted: 2018-01-01

## 2018-10-25 NOTE — PLAN OF CARE
Problem: Patient Care Overview  Goal: Plan of Care Review    Recommendations     Recommendation/Intervention:   1. When medically appropriate, advance diet to cardiac with Boost Plus, per pt's home regimen.   2. RD following.     Goals: Advancement of diet by RD follow up  Nutrition Goal Status: new

## 2018-10-25 NOTE — ASSESSMENT & PLAN NOTE
-S/P HM III DT 7/2017  -Speed 5200  -INR pending.   -Antiplatelets: No ASA secondary to recurrent GIB.    -LDH at baseline.   -Continue Lasix 20 BID.  -Will get echo.  -Admit to ICU overnight.     Procedure: Device Interrogation Including analysis of device parameters  Current Settings: Ventricular Assist Device    TXP LVAD INTERROGATIONS 8/29/2018 8/11/2018 8/11/2018 8/10/2018 8/10/2018 8/10/2018 8/10/2018   Type - HeartMate3 HeartMate3 HeartMate3 HeartMate3 - HeartMate3   Flow - 4.0 4.2 4.0 4.1 4.0 3.7   Speed - 5200 5200 5200 5250 5200 5200   PI - 3.5 3.0 3.5 3.5 3.9 4.2   Power ([place]) - 3.5 3.6 3.6 3.3 3.5 3.6   LSL - - - - - 4800 4800   Pulsatility Pulse Intermittent pulse Intermittent pulse - - - -      complains of pain/discomfort/lightheadedness/dizziness

## 2018-10-25 NOTE — PLAN OF CARE
Problem: Patient Care Overview  Goal: Plan of Care Review  Outcome: Ongoing (interventions implemented as appropriate)  Pt AAOx4. O2 sats 100% on room air. VAD numbers stable--Speed 5200, Flows 4-4.1, PI 3.6-3.8, and prasad 3.5-3.6.  CBCs Q8H. No breakdown noted. Spouse at bedside throughout shift. NPO at midnight for EGD tomorrow.

## 2018-10-25 NOTE — CONSULTS
Midline placed  in right brachial, size [id number] Lot# [id number] Removal date on or before 11/23/18. Needle advanced into vessel with real time ultrasound guidance. Image recorded and saved.

## 2018-10-25 NOTE — NURSING
Called report to santosh [phone] to transfer patient to room 80785.  2nd Unit of PRBCs infusing.  Patient denies chest pain, SOB, and nausea.

## 2018-10-25 NOTE — SIGNIFICANT EVENT
Results for MIRTA LOPEZ (MRN [id number]) as of 10/25/2018 01:10   Ref. Range 10/24/2018 16:11 10/24/2018 16:11 10/24/2018 16:36 10/24/2018 23:56 10/25/2018 00:40   Hemoglobin Latest Ref Range: 14.0 - 18.0 g/dL     5.3 (LL)   Hematocrit Latest Ref Range: 40.0 - 54.0 %     17.9 (LL)   Report to Dr. Chavarria, order to transfuse two units of PRBCs

## 2018-10-25 NOTE — PROGRESS NOTES
Admit Note     Met with patient and spouse to assess needs. Patient is a 68 y.o.  male, admitted for anemia, VT, essential hypertension, nonischemic cardiomyopathy and LVAD.  Pt received LVAD 7-27-17.  Pt's spouse does the dressing changes. Pt ws last discharged on 10-.    Patient admitted from home on 10/24/2018 .  At this time, patient presents as alert and oriented x 4 and calm.  At this time, patients caregiver presents as alert and oriented x 4, good eye contact and communicative.    Household/Family Systems     Patient resides with patient's spouse and adult son Boni, at     [address] [facility] [address].        Support system includes spouse, adult children and multiple family members.   Patient does not have dependents that are need of being cared for.     Patients primary caregiver is Kia Hayden, patients spouse, phone number [phone].    Pt's home phone: [phone]  Emergency contacts:  Boni Hayden (son, lives with pt) [phone]  Rayna ( daughter) [phone]  Malcolm (son in law) [phone]  Pt's spouse reports son in law in the  in [place]  Artie Hayden (nephew) [phone]    During admission, patient's caregiver plans to stay in patient's room.  Confirmed patient and patients caregivers do have access to reliable transportation.    Cognitive Status/Learning     Patient reports reading ability as 8th grade and states patient does not have difficulty with reading, writing, seeing, hearing, comprehension, learning and memory.  Patient reports patient learns best by visual with spouse support.     Needed: No.   Highest education level: Grade School (0-8)    Vocation/Disability   .  Working for Income: No  If no, reason not working: retired   Patient retired in 2000 from the [employer].  Pt was a .    Pt's spouse worked at [employer] and as a PCA until spouse became ill.    Pt's spouse reports she receives SSI.  Pt receives $1,008 per month    Adherence     Patient reports a high level of adherence to patients health care regimen.  Adherence counseling and education provided. Patient verbalizes understanding.    Substance Use    Patient reports the following substance usage.    Tobacco: none, patient denies any use.  Alcohol: none, patient denies any use.  Illicit Drugs/Non-prescribed Medications: none, patient denies any use.  Patient states clear understanding of the potential impact of substance use.  Substance abstinence/cessation counseling, education and resources provided and reviewed.     Services Utilizing/ADLS    Infusion Service: Prior to admission, patient utilizing? no Pt has used [facility] in the past.  Home Health: Prior to admission, patient utilizing? no Pt has used [facility] in the past.  Pt's spouse reports pt goes to the lab to have blood drawn.  Pt's spouse reports HH told her they don't have a person who is able to draw pt's blood.  DME: Prior to admission, yes [place], wheelchair and bedside commode.  Pt not using walker or BSC at this time.   Pulmonary/Cardiac Rehab: Prior to admission, no  Dialysis:  Prior to admission, no  Transplant Specialty Pharmacy:  Prior to admission, no.    Prior to admission, patient reports patient was somewhat  independent with ADLS, until yesterday when the pt became weak and started having trouble.  Pt  was not driving.  Patient reports patient is not able to care for self at this time due to compromised medical condition (as documented in medical record) and physical weakness..  Patient indicates a willingness to care for self once medically cleared to do so.    Insurance/Medications    Insured by   Payor/Plan Subscr  Sex Relation Sub. Ins. ID Effective Group Num   1. MEDICARE - ME* MIRTA LOPEZ 1950 Male  [id number] 6/1/15                                    [address]   2. BLUE CROSS * MIRTA LOPEZ 1950 Male  [id number] 1/1/10 [id number]                                    [address] [address]      Primary Insurance (for UNOS reporting): Public Insurance - Medicare FFS (Fee For Service)  Secondary Insurance (for UNOS reporting): Private Insurance   Pt's spouse reports she plans on talking to [facility] financial cord about medicaid.     Patient reports patient is able to obtain and afford medications at this time and at time of discharge.    Living Will/Healthcare Power of     Patient states patient does not have a LW and/or HCPA.   provided education regarding LW and HCPA and the completion of forms.    Coping/Mental Health    Patient is coping adequately with the aid of  family members.   Patient denies mental health difficulties.   Pt and spouse report increased concerns due to today's move to ICU.  The pt's spouse appears fatigued and reports concerns about spouse and his future ability to get a heart transplant.   Worker provided support.     Discharge Planning    At time of discharge, patient plans to return to patient's home under the care of spouse.  Patients spouse will transport patient.  Per rounds today, expected discharge date has not been medically determined at this time. Patient and patients caregiver  verbalize understanding and are involved in treatment planning and discharge process.    Additional Concerns    Patient is being followed for needs, education, resources, information, emotional support, supportive counseling, and for supportive and skilled discharge plan of care.  providing ongoing psychosocial support, education, resources and d/c planning as needed.  SW remains available. Patient's caregiver verbalizes understanding and agreement with information reviewed,  availability and how to access available resources as needed. Patient verbalizes understanding and agreement with information reviewed, social work availability, and how to access available resources as needed.

## 2018-10-25 NOTE — HPI
Suman Hayden is a 68 year old man who presented w/ symptomatic anemia in the setting of acute blood loss likely due to GI bleed.  GI was consulted for initial Hb ~4 and hx of GIB.  He was transferred from [place].  MHx includes AICD, LVAD, CHF, CAD, gastric polyp, GI bleed, HTN, HLD, and small bowel resection.  He has had multiple GI bleeds in the past.  Of note, he had GIB in March 2018 w/ no clear source of bleeding found on EGD or colonoscopy, requiring small bowel resection.  In August 2018 he had another likely GI bleed w/ melena, source again not found on EGD and colonoscopy, which self-resolved.  On current presentation, symptoms include generalized weakness, worsening fatigue, pallor, R and L sided CP, abdominal pain, subjective fever, diaphoresis. These symptoms have been going on for the past week but worsened yesterday on 10/24.  His wife reports 2x episodes angélica-brown emesis yesterday prior to admission.  Bowel movements have been normal in color and consistency.  He is chronically constipated and takes miralax PRN, usually having 1x BM daily.  He denies NSAID/ASA use.  He is anticoagulated on warfarin for LVAD (current INR 4.4).  Hb on presentation was 4.2, most recently 5.3.  He is currently receiving his 3rd unit of RBCs.    August 2018 EGD:  - Normal esophagus.  - Normal gastric body.  - Erythematous mucosa in the prepyloric region of the stomach.  - Mucosal nodule found in the duodenum.  - No specimens collected.  - No signs or sources of bleeding found.    August 2018 colonoscopy:  - Diverticulosis in the sigmoid colon.  - Diverticulosis in the ascending colon.  - One 2 mm polyp in the proximal transverse colon. Resection not attempted.  - Stool in the entire examined colon.  - The examined portion of the ileum was normal.  - No specimens collected.    March 2018 EGD:  - Normal esophagus.  - Esophagogastric landmarks identified.  - Multiple gastric polyps. Resected and retrieved.  - Mucosal  changes in the duodenum.  - Normal third portion of the duodenum and fourth portion of the duodenum.  - Normal examined jejunum.    March 2018 colonoscopy:  - Diverticulosis in the sigmoid colon.  - One 3 mm polyp in the proximal transverse colon. Resection not attempted.  - The examined portion of the ileum was normal.  - No specimens collected.

## 2018-10-25 NOTE — SUBJECTIVE & OBJECTIVE
Past Medical History:   Diagnosis Date    Arthritis     Cardiomyopathy     CHF (congestive heart failure)     Coronary artery disease     Encounter for blood transfusion     Gastric polyp     Hyperlipidemia     Hypertension     Hypotension, iatrogenic     Iron deficiency anemia due to chronic blood loss 10/15/2018    LVAD (left ventricular assist device) present     Obesity     S/P implantation of automatic cardioverter/defibrillator (AICD)     Ventricular tachycardia        Past Surgical History:   Procedure Laterality Date    ABDOMINAL SURGERY      APPENDECTOMY      CARDIAC CATHETERIZATION      CARDIAC DEFIBRILLATOR PLACEMENT      CARDIAC DEFIBRILLATOR PLACEMENT      CLOSURE-STERNAL WOUND N/A 7/28/2017    Performed by Sunny Downing MD at [facility] OR 2ND FLR    COLONOSCOPY      COLONOSCOPY N/A 3/9/2018    Procedure: COLONOSCOPY;  Surgeon: Andres Chatterjee MD;  Location: [facility] (2ND FLR);  Service: Endoscopy;  Laterality: N/A;    COLONOSCOPY N/A 8/8/2018    Procedure: COLONOSCOPY;  Surgeon: Andres Chatterjee MD;  Location: [facility] (2ND FLR);  Service: Endoscopy;  Laterality: N/A;    COLONOSCOPY N/A 8/8/2018    Performed by Andres Chatterjee MD at [facility] ENDO (2ND FLR)    COLONOSCOPY N/A 3/9/2018    Performed by Andres Chatterjee MD at [facility] (2ND FLR)    DEVICE ASSISTED ENTEROSCOPY-ANTEROGRADE N/A 1/25/2018    Performed by Andres Chatterjee MD at [facility] ENDO (2ND FLR)    DEVICE ASSISTED ENTEROSCOPY-ANTEROGRADE N/A 12/14/2017    Performed by Andres Chatterjee MD at [facility] (2ND FLR)    EGD (ESOPHAGOGASTRODUODENOSCOPY) N/A 8/8/2018    Performed by Andres Chatterjee MD at [facility] (2ND FLR)    ESOPHAGOGASTRODUODENOSCOPY      ESOPHAGOGASTRODUODENOSCOPY N/A 8/8/2018    Procedure: EGD (ESOPHAGOGASTRODUODENOSCOPY);  Surgeon: Andres Chatterjee MD;  [facility]  Service: Endoscopy;  Laterality: N/A;    ESOPHAGOGASTRODUODENOSCOPY (EGD) N/A 3/6/2018    Performed by Andres CORONADO  MD Sen at [facility] ENDO (2ND FLR)    ESOPHAGOGASTRODUODENOSCOPY (EGD) N/A 12/8/2017    Performed by Gagan Barger MD at [facility] ENDO (2ND FLR)    ESOPHAGOGASTRODUODENOSCOPY (EGD) N/A 8/17/2017    Performed by Kishore Mills MD at [facility] ENDO (2ND FLR)    EXPLORATORY-LAPAROTOMY with small bowel resection  N/A 3/13/2018    Performed by Kenyon Tellez MD at [facility] OR 2ND FLR    HEART CATH-RIGHT Right 7/3/2017    Performed by Angie Torres MD at [facility] CATH LAB    INSERTION-LEFT VENTRICULAR ASSIST DEVICE N/A 7/27/2017    Performed by Sunny Downing MD at [facility] OR 2ND FLR    INSERTION-LEFT VENTRICULAR ASSIST DEVICE N/A 7/25/2017    Performed by Sunny Downing MD at [facility] OR 2ND FLR    LEFT VENTRICULAR ASSIST DEVICE  07/27/2017    PLACEMENT-NEOPERICARDIUM N/A 7/28/2017    Performed by Sunny Downing MD at [facility] OR 2ND FLR    RESECTION-BOWEL  3/13/2018    Performed by Kenyon Tellez MD at [facility] OR 2ND FLR    Retrograde deivce assisted enteroscopy N/A 1/26/2018    Performed by Jesse Davis MD at [facility] ENDO (2ND FLR)    REVISION-LEAD-ICD N/A 11/20/2017    Performed by Rubén Winchester MD at [facility] CATH LAB    TONSILLECTOMY      TRANSESOPHAGEAL ECHOCARDIOGRAM (GLENN) N/A 1/9/2018    Performed by [facility] Diagnostic Provider at [facility] CATH LAB       Review of patient's allergies indicates:   Allergen Reactions    Asa [aspirin] Other (See Comments)     Bleeding ulcer       Current Facility-Administered Medications   Medication    0.9%  NaCl infusion (for blood administration)    amiodarone tablet 400 mg    dronabinol capsule 5 mg    HYDROcodone-acetaminophen 5-325 mg per tablet 1 tablet    magnesium oxide tablet 400 mg    mirtazapine tablet 7.5 mg    pantoprazole EC tablet 40 mg    polyethylene glycol packet 17 g    pravastatin tablet 20 mg    sodium chloride 0.9% flush 3 mL     Family History     Problem Relation (Age of Onset)    Heart disease Mother, Father        Tobacco Use    Smoking status:  Never Smoker    Smokeless tobacco: Never Used   Substance and Sexual Activity    Alcohol use: No    Drug use: No    Sexual activity: Not Currently     Review of Systems   Constitutional: Positive for fatigue. Negative for chills, diaphoresis and fever.   HENT: Negative for congestion.    Respiratory: Negative for apnea, cough, choking, chest tightness, shortness of breath, wheezing and stridor.    Cardiovascular: Positive for chest pain. Negative for palpitations and leg swelling.   Gastrointestinal: Negative for abdominal distention, abdominal pain, anal bleeding, blood in stool, constipation, diarrhea, nausea, rectal pain and vomiting.   Genitourinary: Negative for difficulty urinating and dysuria.   Musculoskeletal: Negative for arthralgias.   Neurological: Positive for weakness.     Objective:     Vital Signs (Most Recent):  Temp: 98.2 °F (36.8 °C) (10/24/18 2300)  Pulse: 79 (10/24/18 2300)  Resp: 18 (10/24/18 2300)  BP: (!) 70/0 (10/24/18 2300)  SpO2: (!) 91 % (10/24/18 2300) Vital Signs (24h Range):  Temp:  [97.6 °F (36.4 °C)-98.4 °F (36.9 °C)] 98.2 °F (36.8 °C)  Pulse:  [] 79  Resp:  [18-20] 18  SpO2:  [91 %-100 %] 91 %  BP: (66-77)/(0-61) 70/0     No data found.  Body mass index is 24.28 kg/m².    No intake or output data in the 24 hours ending 10/25/18 0153    Physical Exam   Constitutional: He appears well-developed and well-nourished. No distress.   HENT:   Head: Normocephalic and atraumatic.   Neck: Normal range of motion. No JVD present.   Cardiovascular: Normal rate, regular rhythm and intact distal pulses. Exam reveals no gallop and no friction rub.   Murmur heard.  Smooth LVAD humm   Pulmonary/Chest: Effort normal and breath sounds normal. No stridor. No respiratory distress. He has no wheezes. He has no rales. He exhibits no tenderness.   Abdominal: Soft. Bowel sounds are normal. He exhibits no distension. There is no tenderness.   Musculoskeletal: Normal range of motion. He exhibits no  edema.   Skin: Skin is warm and dry. Capillary refill takes less than 2 seconds. He is not diaphoretic.   Psychiatric: He has a normal mood and affect.   Nursing note and vitals reviewed.      Significant Labs:  CBC:  Recent Labs   Lab 10/24/18  1519 10/25/18  0040   WBC 14.46* 14.72*   RBC 1.69* 2.01*   HGB 4.2* 5.3*   HCT 14.2* 17.9*    148*   MCV 84 89   MCH 24.9* 26.4*   MCHC 29.6* 29.6*     BNP:  Recent Labs   Lab 10/24/18  1519   *     CMP:  Recent Labs   Lab 10/24/18  1519 10/25/18  0040   * 109   CALCIUM 8.6* 8.5*   ALBUMIN 3.1* 2.9*   PROT 5.7* 5.4*    133*   K 4.2 4.6   CO2 13* 17*    108   BUN 47* 61*   CREATININE 1.5* 1.9*   ALKPHOS 39* 43*   ALT 14 12   AST 26 15   BILITOT 0.4 0.7      Coagulation:   Recent Labs   Lab 10/22/18  1157 10/25/18  0040   INR 2.3* 4.4*     LDH:  Recent Labs   Lab 10/25/18  0040   *     Microbiology:  Microbiology Results (last 7 days)     ** No results found for the last 168 hours. **          ABGs: No results for input(s): PH, PCO2, HCO3, POCSATURATED, BE in the last 72 hours.  BMP:   Recent Labs   Lab 10/25/18  0040      *   K 4.6      CO2 17*   BUN 61*   CREATININE 1.9*   CALCIUM 8.5*     Cardiac Markers: No results for input(s): CKMB, TROPONINT, MYOGLOBIN in the last 72 hours.  Coagulation:   Recent Labs   Lab 10/25/18  0040   INR 4.4*     I have reviewed all pertinent labs within the past 24 hours.    Diagnostic Results:  I have reviewed all pertinent imaging results/findings within the past 24 hours.

## 2018-10-25 NOTE — PROGRESS NOTES
Patient admitted to CSU from ED BR.  Patient arrives to unit via stretcher with EMS. AAOX4, VSS , oriented to surroundings. Bed in lowest position. Call system within reach.  Side rails up x 2.  MD and telemetry notified patient is in room.

## 2018-10-25 NOTE — CONSULTS
"  [facility]  Adult Nutrition  Consult Note    SUMMARY     Recommendations    Recommendation/Intervention:   1. When medically appropriate, advance diet to cardiac with Boost Plus, per pt's home regimen.   2. RD following.    Goals: Advancement of diet by RD follow up  Nutrition Goal Status: new  Communication of RD Recs: (POC)    Reason for Assessment    Reason for Assessment: consult  Diagnosis: gastrointestinal disease(anemia due to GIB)  Relevant Medical History: NICM, s/p LVAD, multiple GIBs, CHF, CAD, HLD, HTN, s/p AICD  Interdisciplinary Rounds: attended    General Information Comments: Pt admitted with Hgb=4. Admitted to CSU but transferred to ICU bed. No recent wt loss noted. NPO, elevated INR noted.    Nutrition Discharge Planning: Adequate PO intake on cardiac diet    Nutrition Risk Screen    Nutrition Risk Screen: no indicators present    Nutrition/Diet History    Do you have any cultural, spiritual, Gnosticism conflicts, given your current situation?: Religious   Supplemental Drinks or Food Habits: Boost Plus  Factors Affecting Nutritional Intake: NPO    Anthropometrics    Temp: 98.2 °F (36.8 °C)  Height Method: Stated  Height: 5' 9" (175.3 cm)  Height (inches): 69 in  Weight Method: Standard Scale  Weight: 70.5 kg (155 lb 6.8 oz)  Weight (lb): 155.43 lb  Ideal Body Weight (IBW), Male: 160 lb  % Ideal Body Weight, Male (lb): 97.14 lb  BMI (Calculated): 23  BMI Grade: 18.5-24.9 - normal       Lab/Procedures/Meds    Pertinent Labs Reviewed: reviewed  Pertinent Labs Comments: Na 135, BUN 63, CRt 1.6, Alb 2.9, INR 3.6  Pertinent Medications Reviewed: reviewed  Pertinent Medications Comments: Marinol, lasix, Mg, mirtazapine, Zofran, pantoprazole, statin    Physical Findings/Assessment    Overall Physical Appearance: nourished  Oral/Mouth Cavity: tooth/teeth missing  Skin: intact    Estimated/Assessed Needs    Weight Used For Calorie Calculations: 70.5 kg (155 lb 6.8 oz)  Energy Calorie " Requirements (kcal): 1832  Energy Need Method: Barnwell-St Jeor(x 1.25 (PAL))  Protein Requirements: 71-85 gm (1.0-1.2 gm/kg)  Weight Used For Protein Calculations: 70.5 kg (155 lb 6.8 oz)  Fluid Requirements (mL): per MD     RDA Method (mL): 1832       Nutrition Prescription Ordered    Current Diet Order: NPO    Evaluation of Received Nutrient/Fluid Intake       % Intake of Estimated Energy Needs: 0 - 25 %  % Meal Intake: NPO    Nutrition Risk    Level of Risk/Frequency of Follow-up: high     Assessment and Plan    Nutrition Problem  Inadequate energy intake    Related to (etiology):   Altered GI function    Signs and Symptoms (as evidenced by):   NPO with no alternate means for nutrition     Interventions/Recommendations (treatment strategy):  Decreased sodium diet, commercial beverage    Nutrition Diagnosis Status:   New      Monitor and Evaluation    Food and Nutrient Intake: energy intake, food and beverage intake  Food and Nutrient Adminstration: diet order  Physical Activity and Function: nutrition-related ADLs and IADLs  Anthropometric Measurements: weight, weight change, body mass index  Biochemical Data, Medical Tests and Procedures: electrolyte and renal panel, gastrointestinal profile, glucose/endocrine profile, inflammatory profile  Nutrition-Focused Physical Findings: overall appearance     Nutrition Follow-Up    RD Follow-up?: Yes

## 2018-10-25 NOTE — NURSING
Patient is alert and oriented, denies chest pain and SOB.  Patient reports generalized weakness and nausea.

## 2018-10-25 NOTE — ANESTHESIA PREPROCEDURE EVALUATION
[facility]  Anesthesia Pre-Operative Evaluation         Patient Name: Suman Hayden  YOB: 1950  MRN: [id number]    SUBJECTIVE:     Pre-operative evaluation for Procedure(s) (LRB):  EGD (ESOPHAGOGASTRODUODENOSCOPY) (N/A)     10/25/2018    Suman Hayden is a 68 y.o. male w/ a significant PMHx of NICM, S/P Heartmate 3 as DT therapy 7/27/17, ICD revision 11/20/17 with Dr. Vidal (DC ICD placed with active RA/LV leads (RV lead capped during revision) that was complicated by pocket hematoma, GI bleed, VT on Amiodarone, admitted for symptomatic anemia in the setting of acute blood loss anemia due to GI bleed. Hgb 4 on arrival to ED, baseline 9. Last hgb 8.8 on 10/25 1450.    Patient now presents for the above procedure(s).    LDA:        Peripheral IV - Single Lumen 10/25/18 0500 Anterior;Right Wrist (Active)   Site Assessment Clean;Dry;Intact;No redness;No swelling 10/25/2018  3:15 PM   Line Status Flushed;Infusing 10/25/2018  3:15 PM   Dressing Change Due 10/29/18 10/25/2018  3:15 PM   Site Change Due 10/29/18 10/25/2018  3:15 PM   Reason Not Rotated Not due 10/25/2018  3:15 PM   Number of days: 0            Midline Catheter Insertion/Assessment  - Single Lumen 10/25/18 (Active)   Number of days: 0            VAD 07/27/17 1312 Left ventricular assist device HeartMate 3 (Active)   Site Location Abdomen right 10/25/2018  3:15 PM   Site Assessment Other (Comment) 10/25/2018  3:15 PM   Driveline Exit Site 1 10/25/2018 10:00 AM   Dressing Status Clean;Dry;Intact 10/25/2018  3:15 PM   Dressing Intervention Dressing reinforced 10/16/2018  7:41 AM   Performed By Family 10/25/2018  3:15 PM   Dressing Change Schedule Every other day 10/25/2018  3:15 PM   Dressing Change Due 10/27/18 10/25/2018  3:15 PM   Driveline Jansen in use tape 10/25/2018 11:15 AM   Condition CDI 10/24/2018 11:53 PM   Date changed 10/09/18 10/10/2018  8:20 PM   Power Source External DC 10/25/2018  3:15 PM    Equipment inventory at  Admission 10/25/2018  9:50 AM   Pump type HeartMate3 10/25/2018  9:50 AM   Primary Controller [id number] 10/25/2018  9:50 AM   Extra Controller [id number] 10/25/2018  9:50 AM   Battery 1 [id number] 10/25/2018  9:50 AM   Battery 2 [id number] 10/25/2018  9:50 AM   Battery 3 [id number] 10/17/2018  1:01 PM   Battery 4 [id number] 10/17/2018  1:01 PM   Battery 5 [id number] 10/25/2018  9:50 AM   Battery 6 [id number] 10/25/2018  9:50 AM   Battery 7 [id number] 10/17/2018  1:01 PM   Battery 8 [id number] 10/17/2018  1:01 PM   AC Adapter 1 [id number] 8/6/2018  5:00 PM   Battery Charger [id number] 10/25/2018  9:50 AM   Power Module [id number] 10/25/2018  9:50 AM   Mobile Power Unit CSU equipment 9/28/2018 11:08 AM   Battery Clips 4 10/25/2018  9:50 AM   Monitor [id number] 10/25/2018  9:50 AM   Number of days: 455       Prev airway:   Placement Date: 03/13/18; Placement Time: 1256; Method of Intubation: Direct laryngoscopy; Inserted by: Other (CELIO Etienne); Airway Device: Endotracheal Tube; Mask Ventilation: Easy - oral; Intubated: Postinduction; Blade: Francois #2; Airway Device Size: 7.5; Style: Cuffed; Placement Verified By: Auscultation, ETT Condensation, Capnometry; Grade: Grade II; Complicating Factors: None; Intubation Findings: Positive EtCO2, Bilateral breath sounds, Atraumatic/Condition of teeth unchanged;  Depth of Insertion: 22; Securment: Lips; Complications: None; Breath Sounds: Equal Bilateral; Insertion Attempts: 1; Removal Date: 03/13/18;  Removal Time: 1400      Drips: None documented.      Patient Active Problem List   Diagnosis    Nonischemic cardiomyopathy    Encounter for monitoring amiodarone therapy    Essential hypertension    VT (ventricular tachycardia)    Elevated PSA    Hepatitis B core antibody positive since 2012    Chronic combined systolic and diastolic congestive heart failure    Hyperbilirubinemia    Atrial tachycardia    CHF (congestive heart failure)    Hyperglycemia    Heart  failure    AICD (automatic cardioverter/defibrillator) present    LVAD (left ventricular assist device) present    Heart replaced by heart assist device    Chronic anticoagulation    Constipation    Normocytic anemia    Anemia    Ileum ulcer    Melena    Pure hypercholesterolemia    Acute blood loss anemia    GI bleed    Chronic systolic congestive heart failure    Wound drainage    ICD (implantable cardioverter-defibrillator) discharge    Inappropriate shocks from ICD (implantable cardioverter-defibrillator)    Tingling in extremities    Typical atrial flutter    Acute GI bleeding    Supratherapeutic INR    Examination of participant in clinical trial    Chest pain    Left ventricular assist device (LVAD) complication    Acute on chronic combined systolic and diastolic heart failure    Hypotension due to drugs    Anticoagulation monitoring, INR range 2-3    Iron deficiency anemia due to chronic blood loss    INDIRA (acute kidney injury)       Review of patient's allergies indicates:   Allergen Reactions    Asa [aspirin] Other (See Comments)     Bleeding ulcer       Current Inpatient Medications:   amiodarone  400 mg Oral Daily    dronabinol  5 mg Oral TID    magnesium oxide  400 mg Oral Daily    mirtazapine  7.5 mg Oral Nightly    [START ON 10/27/2018] pantoprazole  40 mg Oral BID    pantoprazole  40 mg Intravenous BID    pravastatin  20 mg Oral QHS    sodium chloride 0.9%  3 mL Intravenous Q8H       No current facility-administered medications on file prior to encounter.      Current Outpatient Medications on File Prior to Encounter   Medication Sig Dispense Refill    amiodarone (PACERONE) 200 MG Tab Take 2 tablets (400 mg total) by mouth once daily. 60 tablet 11    dronabinol (MARINOL) 5 MG capsule TAKE ONE CAPSULE BY MOUTH 3 TIMES DAILY BEFORE MEALS 90 capsule 2    ferrous sulfate 325 (65 FE) MG EC tablet Take 1 tablet (325 mg total) by mouth 3 (three) times daily.  0     HYDROcodone-acetaminophen (NORCO) 5-325 mg per tablet Take 1 tablet by mouth every 6 (six) hours as needed for Pain.      lisinopril (PRINIVIL,ZESTRIL) 2.5 MG tablet Take 1 tablet (2.5 mg total) by mouth once daily. 90 tablet 3    magnesium oxide (MAG-OX) 400 mg tablet Take 1 tablet (400 mg total) by mouth once daily. 180 tablet 3    mirtazapine (REMERON) 7.5 MG Tab Take 1 tablet (7.5 mg total) by mouth nightly. 30 tablet 11    pantoprazole (PROTONIX) 40 MG tablet Take 1 tablet (40 mg total) by mouth once daily. 30 tablet 11    polyethylene glycol (GLYCOLAX) 17 gram PwPk Take 17 g by mouth daily as needed (constipation). 14 each 0    pravastatin (PRAVACHOL) 20 MG tablet Take 1 tablet (20 mg total) by mouth every evening. 30 tablet 11    spironolactone (ALDACTONE) 25 MG tablet Take 1 tablet (25 mg total) by mouth once daily. 30 tablet 5    warfarin (COUMADIN) 2 MG tablet Take 3mg (1 & 1/2) tabs on 10/17 only, followed by 2mg (1 tab) orally daily thereafter. 30 tablet 11       Past Surgical History:   Procedure Laterality Date    ABDOMINAL SURGERY      APPENDECTOMY      CARDIAC CATHETERIZATION      CARDIAC DEFIBRILLATOR PLACEMENT      CARDIAC DEFIBRILLATOR PLACEMENT      CLOSURE-STERNAL WOUND N/A 7/28/2017    Performed [facility] at [facility] OR 2ND FLR    COLONOSCOPY      COLONOSCOPY N/A 3/9/2018    Procedure: COLONOSCOPY;  Surgeon: Andres Chatterjee MD;  Location: [facility] (2ND [facility]);  Service: Endoscopy;  Laterality: N/A;    COLONOSCOPY N/A 8/8/2018    Procedure: COLONOSCOPY;  Surgeon: Andres Chatterjee MD;  Location: [facility] (2ND FLR);  Service: Endoscopy;  Laterality: N/A;    COLONOSCOPY N/A 8/8/2018    Performed by Andres Chatterjee MD at [facility] (2ND FLR)    COLONOSCOPY N/A 3/9/2018    Performed by Andres Chatterjee MD at [facility] (2ND FLR)    DEVICE ASSISTED ENTEROSCOPY-ANTEROGRADE N/A 1/25/2018    Performed by Andres Chatterjee MD at [facility] (2ND FLR)    DEVICE ASSISTED  ENTEROSCOPY-ANTEROGRADE N/A 12/14/2017    Performed by Andres Chatterjee MD at [facility] ENDO (2ND FLR)    EGD (ESOPHAGOGASTRODUODENOSCOPY) N/A 8/8/2018    Performed by Andres Chatterjee MD at [facility] ENDO (2ND FLR)    ESOPHAGOGASTRODUODENOSCOPY      ESOPHAGOGASTRODUODENOSCOPY N/A 8/8/2018    Procedure: EGD (ESOPHAGOGASTRODUODENOSCOPY);  Surgeon: Andres Chatterjee MD;  Location: [facility] ENDO (2ND FLR);  Service: Endoscopy;  Laterality: N/A;    ESOPHAGOGASTRODUODENOSCOPY (EGD) N/A 3/6/2018    Performed by Andres Chatterjee MD at [facility] ENDO (2ND FLR)    ESOPHAGOGASTRODUODENOSCOPY (EGD) N/A 12/8/2017    Performed by Gagan Barger MD at [facility] ENDO (2ND FLR)    ESOPHAGOGASTRODUODENOSCOPY (EGD) N/A 8/17/2017    Performed by Kishore Mills MD at [facility] ENDO (2ND FLR)    EXPLORATORY-LAPAROTOMY with small bowel resection  N/A 3/13/2018    Performed by Kenyon Tellez MD at [facility] OR 2ND FLR    HEART CATH-RIGHT Right 7/3/2017    Performed by Angie Torres MD at [facility] CATH LAB    INSERTION-LEFT VENTRICULAR ASSIST DEVICE N/A 7/27/2017    Performed by Sunny Downing MD at [facility] OR 2ND FLR    INSERTION-LEFT VENTRICULAR ASSIST DEVICE N/A 7/25/2017    Performed by Sunny Downing MD at [facility] OR 2ND FLR    LEFT VENTRICULAR ASSIST DEVICE  07/27/2017    PLACEMENT-NEOPERICARDIUM N/A 7/28/2017    Performed by Sunny Downing MD at [facility] OR 2ND FLR    RESECTION-BOWEL  3/13/2018    Performed by Kenyon Tellez MD at [facility] OR 2ND FLR    Retrograde deivce assisted enteroscopy N/A 1/26/2018    Performed by [facility] at [facility] (2ND FLR)    REVISION-LEAD-ICD N/A 11/20/2017    Performed by Rubén Winchester MD at [facility] CATH LAB    TONSILLECTOMY      TRANSESOPHAGEAL ECHOCARDIOGRAM (GLENN) N/A 1/9/2018    Performed by [facility] Diagnostic Provider at [facility] CATH LAB       Social History     Socioeconomic History    Marital status:      Spouse name: Not on file    Number of children: Not on file    Years of education: Not  on file    Highest education level: Not on file   Social Needs    Financial resource strain: Not on file    Food insecurity - worry: Not on file    Food insecurity - inability: Not on file    Transportation needs - medical: Not on file    Transportation needs - non-medical: Not on file   Occupational History    Not on file   Tobacco Use    Smoking status: Never Smoker    Smokeless tobacco: Never Used   Substance and Sexual Activity    Alcohol use: No    Drug use: No    Sexual activity: Not Currently   Other Topics Concern    Not on file   Social History Narrative    Not on file       OBJECTIVE:     Vital Signs Range (Last 24H):  Temp:  [36.8 °C (98.2 °F)-37.2 °C (98.9 °F)]   Pulse:  []   Resp:  [12-32]   BP: (70-96)/(0-75)   SpO2:  [91 %-100 %]       Significant Labs:  Lab Results   Component Value Date    WBC 13.50 (H) 10/25/2018    HGB 8.8 (L) 10/25/2018    HCT 27.5 (L) 10/25/2018     (L) 10/25/2018    CHOL 149 07/12/2018    TRIG 62 02/19/2018    HDL 60 02/19/2018    ALT 12 10/25/2018    AST 15 10/25/2018     (L) 10/25/2018    K 4.0 10/25/2018     10/25/2018    CREATININE 1.6 (H) 10/25/2018    BUN 63 (H) 10/25/2018    CO2 21 (L) 10/25/2018    TSH 3.346 06/25/2018    PSA 2.8 05/09/2018    INR 3.6 (H) 10/25/2018    HGBA1C 4.7 05/09/2018       Diagnostic Studies: No relevant studies.    EKG:   Vent. Rate : 080 BPM     Atrial Rate : 079 BPM     P-R Int : 000 ms          QRS Dur : 192 ms      QT Int : 572 ms       P-R-T Axes : 000 -77 107 degrees     QTc Int : 659 ms    Technically poor ECG tracing  recommend repeat ECG  Confirmed by CADY KELLEY MD ([id number]) on 10/25/2018 1:08:02 PM    Referred By: [email or web address]   SELF           Confirmed By:CADY KELLEY MD    Vent. Rate : 080 BPM     Atrial Rate : 054 BPM     P-R Int : 000 ms          QRS Dur : 290 ms      QT Int : 424 ms       P-R-T Axes : 000 013 -07 degrees     QTc Int : 489 ms    Baseline Artifact  repeat EKG  Confirmed by CONCHA PARIKH,  REINA ([id number]) on 10/17/2018 2:42:11 PM    Referred By: DEANNA RIVERA           Confirmed By:REINA KERN MD    2D ECHO:  Results for orders placed or performed during the hospital encounter of 10/24/18   2D echo with color flow doppler   Result Value Ref Range    Calculated EF 10 (A) 55 - 65   CONCLUSIONS   There is a Heartmate III LVAD in place. Inflow cannula is visualized. Outflow graft is not visualized. Baseline speed is 5200 rpms. The aortic valve does not open. Septum is midline.     1 - Severely depressed left ventricular systolic function (EF 10-15%).     2 - Normal right ventricular systolic function .     3 - Heartmate III LVAD; speed 5200.     4 - Mild left atrial enlargement.     5 - Concentric remodeling.     6 - No wall motion abnormalities.       ASSESSMENT/PLAN:         Anesthesia Evaluation    I have reviewed the Patient Summary Reports.    I have reviewed the Nursing Notes.   I have reviewed the Medications.     Review of Systems  Anesthesia Hx:  No problems with previous Anesthesia  History of prior surgery of interest to airway management or planning: heart surgery. Previous anesthesia: General  Procedure performed at an [facility] Facility.  Denies Personal Hx of Anesthesia complications.   Social:  Non-Smoker, No Alcohol Use    Hematology/Oncology:     Oncology Normal    -- Anemia:   EENT/Dental:EENT/Dental Normal   Cardiovascular:   Exercise tolerance: poor Pacemaker (AICD, last shock 11/28, hematoma over site) Hypertension CAD   CHF NYHA Classification IV S/p LVAD  Can walk up 1 flight of stairs without SOB   Pulmonary:  Pulmonary Normal    Renal/:  Renal/ Normal     Hepatic/GI:   PUD, GI bleed   Musculoskeletal:  Musculoskeletal Normal    Neurological:  Neurology Normal    Endocrine:  Endocrine Normal    Dermatological:  Skin Normal    Psych:  Psychiatric Normal           Physical Exam  General:  Well nourished    Airway/Jaw/Neck:  Airway Findings: Mouth Opening: Normal Tongue: Normal   General Airway Assessment: Adult  Mallampati: II  Improves to II with phonation.  TM Distance: Normal, at least 6 cm  Jaw/Neck Findings:  Neck ROM: Normal ROM      Dental:  Dental Findings: Edentulous, Upper Dentures, Lower Dentures   Chest/Lungs:  Chest/Lungs Findings: Clear to auscultation, Normal Respiratory Rate     Heart/Vascular:  Heart Findings: Other Heart Findings: LVAD in place     Abdomen:  Abdomen Findings:  Normal, Soft, Nontender     Musculoskeletal:  Musculoskeletal Findings: Normal    Mental Status:  Mental Status Findings:  Cooperative, Alert and Oriented         Anesthesia Plan  Type of Anesthesia, risks & benefits discussed:  Anesthesia Type:  general, MAC  Patient's Preference:   Intra-op Monitoring Plan: standard ASA monitors and arterial line  Intra-op Monitoring Plan Comments:   Post Op Pain Control Plan: multimodal analgesia, IV/PO Opioids PRN and per primary service following discharge from PACU  Post Op Pain Control Plan Comments:   Induction:   IV  Beta Blocker:  Patient is not currently on a Beta-Blocker (No further documentation required).       Informed Consent: Patient representative understands risks and agrees with Anesthesia plan.  Questions answered. Anesthesia consent signed with patient representative.  ASA Score: 4     Day of Surgery Review of History & Physical:            Ready For Surgery From Anesthesia Perspective.

## 2018-10-25 NOTE — ASSESSMENT & PLAN NOTE
ROMANA s/p HM3 2017:    - No LFA recently on interrogation   - Hold ASA / Coumadin with acute anemia (INR 4.4)  - LVAD order set  - LDH 200s / D-dimer 0.59 /  - Follow up limited TTE for VAD issues (concerning for hemolysis)   - VAD coordinator aware     Procedure: Device Interrogation Including analysis of device parameters  Current Settings: Ventricular Assist Device  Review of device function is stable/unstable stable    TXP LVAD INTERROGATIONS 10/24/2018 10/17/2018 10/17/2018 10/17/2018 10/16/2018 10/16/2018 10/16/2018   Type HeartMate3 HeartMate3 HeartMate3 HeartMate3 HeartMate3 HeartMate3 HeartMate3   Flow 4.1 3.8 3.9 3.7 4.3 4.4 -   Speed 5200 5200 5200 5200 5200 5200 5200   PI 2.3 3.4 4.8 5.5 2.9 2.4 -   Power ([place]) 3.5 3.6 3.5 3.5 3.6 3.5 -   LSL 4800 - 4800 4800 4800 4800 4800   Pulsatility Intermittent pulse - - Intermittent pulse Intermittent pulse Intermittent pulse Intermittent pulse

## 2018-10-25 NOTE — SUBJECTIVE & OBJECTIVE
Past Medical History:   Diagnosis Date    Arthritis     Cardiomyopathy     CHF (congestive heart failure)     Coronary artery disease     Encounter for blood transfusion     Gastric polyp     Hyperlipidemia     Hypertension     Hypotension, iatrogenic     Iron deficiency anemia due to chronic blood loss 10/15/2018    LVAD (left ventricular assist device) present     Obesity     S/P implantation of automatic cardioverter/defibrillator (AICD)     Ventricular tachycardia        Past Surgical History:   Procedure Laterality Date    ABDOMINAL SURGERY      APPENDECTOMY      CARDIAC CATHETERIZATION      CARDIAC DEFIBRILLATOR PLACEMENT      CARDIAC DEFIBRILLATOR PLACEMENT      CLOSURE-STERNAL WOUND N/A 7/28/2017    Performed by Sunny Downing MD at [facility] OR 2ND FLR    COLONOSCOPY      COLONOSCOPY N/A 3/9/2018    Procedure: COLONOSCOPY;  Surgeon: Andres Chatterjee MD;  Location: [facility] (2ND FLR);  Service: Endoscopy;  Laterality: N/A;    COLONOSCOPY N/A 8/8/2018    Procedure: COLONOSCOPY;  Surgeon: Andres Chatterjee MD;  Location: [facility] (2ND FLR);  Service: Endoscopy;  Laterality: N/A;    COLONOSCOPY N/A 8/8/2018    Performed by Andres Chatterjee MD at [facility] ENDO (2ND FLR)    COLONOSCOPY N/A 3/9/2018    Performed by Andres Chatterjee MD at [facility] (2ND FLR)    DEVICE ASSISTED ENTEROSCOPY-ANTEROGRADE N/A 1/25/2018    Performed by Andres Chatterjee MD at [facility] ENDO (2ND FLR)    DEVICE ASSISTED ENTEROSCOPY-ANTEROGRADE N/A 12/14/2017    Performed by Andres Chatterjee MD at [facility] (2ND FLR)    EGD (ESOPHAGOGASTRODUODENOSCOPY) N/A 8/8/2018    Performed by Andres Chatterjee MD at [facility] (2ND FLR)    ESOPHAGOGASTRODUODENOSCOPY      ESOPHAGOGASTRODUODENOSCOPY N/A 8/8/2018    Procedure: EGD (ESOPHAGOGASTRODUODENOSCOPY);  Surgeon: Andres Chatterjee MD;  [facility]  Service: Endoscopy;  Laterality: N/A;    ESOPHAGOGASTRODUODENOSCOPY (EGD) N/A 3/6/2018    Performed by Andres CORONADO  MD Sen at [facility] ENDO (2ND FLR)    ESOPHAGOGASTRODUODENOSCOPY (EGD) N/A 12/8/2017    Performed by Gagan Barger MD at [facility] ENDO (2ND FLR)    ESOPHAGOGASTRODUODENOSCOPY (EGD) N/A 8/17/2017    Performed by Kishore Mills MD at [facility] ENDO (2ND FLR)    EXPLORATORY-LAPAROTOMY with small bowel resection  N/A 3/13/2018    Performed by Kenyon Tellez MD at [facility] OR 2ND FLR    HEART CATH-RIGHT Right 7/3/2017    Performed by Angie Torres MD at [facility] CATH LAB    INSERTION-LEFT VENTRICULAR ASSIST DEVICE N/A 7/27/2017    Performed by Sunny Downing MD at [facility] OR 2ND FLR    INSERTION-LEFT VENTRICULAR ASSIST DEVICE N/A 7/25/2017    Performed by Sunny Downing MD at [facility] OR 2ND FLR    LEFT VENTRICULAR ASSIST DEVICE  07/27/2017    PLACEMENT-NEOPERICARDIUM N/A 7/28/2017    Performed by Sunny Downing MD at [facility] OR 2ND FLR    RESECTION-BOWEL  3/13/2018    Performed by Kenyon Tellez MD at [facility] OR 2ND FLR    Retrograde deivce assisted enteroscopy N/A 1/26/2018    Performed by Jesse Davis MD at [facility] ENDO (2ND FLR)    REVISION-LEAD-ICD N/A 11/20/2017    Performed by Rubén Winchester MD at [facility] CATH LAB    TONSILLECTOMY      TRANSESOPHAGEAL ECHOCARDIOGRAM (GLENN) N/A 1/9/2018    Performed by [facility] Diagnostic Provider at [facility] CATH LAB       Review of patient's allergies indicates:   Allergen Reactions    Asa [aspirin] Other (See Comments)     Bleeding ulcer     Family History     Problem Relation (Age of Onset)    Heart disease Mother, Father        Tobacco Use    Smoking status: Never Smoker    Smokeless tobacco: Never Used   Substance and Sexual Activity    Alcohol use: No    Drug use: No    Sexual activity: Not Currently     Review of Systems   Constitutional: Positive for diaphoresis, fatigue and fever (Subjective).   HENT: Negative for congestion, rhinorrhea and sore throat.    Eyes: Negative for visual disturbance.   Respiratory: Negative for cough and shortness of breath.     Cardiovascular: Positive for chest pain. Negative for palpitations and leg swelling.   Gastrointestinal: Positive for abdominal pain, nausea and vomiting. Negative for abdominal distention, blood in stool, constipation and diarrhea.   Genitourinary: Negative for dysuria, frequency and hematuria.   Musculoskeletal: Negative for arthralgias and myalgias.   Skin: Positive for pallor.   Neurological: Positive for weakness and light-headedness. Negative for headaches.   Psychiatric/Behavioral: Negative for agitation and confusion. The patient is not nervous/anxious.      Objective:     Vital Signs (Most Recent):  Temp: 98.8 °F (37.1 °C) (10/25/18 0915)  Pulse: 90 (10/25/18 0915)  Resp: 18 (10/25/18 0915)  BP: (!) 86/60 (10/25/18 0915)  SpO2: 98 % (10/25/18 0915) Vital Signs (24h Range):  Temp:  [97.6 °F (36.4 °C)-98.9 °F (37.2 °C)] 98.8 °F (37.1 °C)  Pulse:  [] 90  Resp:  [18-20] 18  SpO2:  [91 %-100 %] 98 %  BP: (66-96)/(0-61) 86/60     Weight: 70.5 kg (155 lb 6.8 oz) (10/25/18 0700)  Body mass index is 22.95 kg/m².      Intake/Output Summary (Last 24 hours) at 10/25/2018 0945  Last data filed at 10/25/2018 0853  Gross per 24 hour   Intake 350 ml   Output 1050 ml   Net -700 ml       Lines/Drains/Airways     Line                 VAD 07/27/17 1312 Left ventricular assist device HeartMate 3 454 days          Peripheral Intravenous Line                 Peripheral IV - Single Lumen 10/25/18 0500 Anterior;Right Wrist less than 1 day                Physical Exam   Constitutional: He is oriented to person, place, and time. He appears well-developed and well-nourished. No distress.   HENT:   Head: Normocephalic and atraumatic.   Eyes: EOM are normal.   Conj pallor   Neck: Normal range of motion. Neck supple.   Cardiovascular: Normal rate and regular rhythm.   LVAD hum heard   Pulmonary/Chest: Effort normal and breath sounds normal. No respiratory distress. He has no wheezes. He has no rales.   Abdominal: Soft. Bowel  sounds are normal. He exhibits no distension and no mass. There is no tenderness. There is no guarding.   Musculoskeletal: Normal range of motion. He exhibits no edema, tenderness or deformity.   Neurological: He is alert and oriented to person, place, and time. No cranial nerve deficit.   Skin: Skin is warm and dry. He is not diaphoretic. There is pallor.       Significant Labs:  CBC:   Recent Labs   Lab 10/24/18  1519 10/25/18  0040 10/25/18  0856   WBC 14.46* 14.72* 13.41*   HGB 4.2* 5.3* 6.5*   HCT 14.2* 17.9* 21.2*    148* 139*     CMP:   Recent Labs   Lab 10/25/18  0040      CALCIUM 8.5*   ALBUMIN 2.9*   PROT 5.4*   *   K 4.6   CO2 17*      BUN 61*   CREATININE 1.9*   ALKPHOS 43*   ALT 12   AST 15   BILITOT 0.7     Coagulation:   Recent Labs   Lab 10/25/18  0040   INR 4.4*     Liver Function Test:   Recent Labs   Lab 10/24/18  1519 10/25/18  0040   ALT 14 12   AST 26 15   ALKPHOS 39* 43*   BILITOT 0.4 0.7   PROT 5.7* 5.4*   ALBUMIN 3.1* 2.9*       Significant Imaging:  Imaging results within the past 24 hours have been reviewed.

## 2018-10-25 NOTE — ASSESSMENT & PLAN NOTE
INDIRA (Cr 1.9 on admit, down to 1.3 today, baseline 1.1):   Likely in the setting of hypoperfusion   - Hold nephrotoxic medications

## 2018-10-25 NOTE — PLAN OF CARE
Problem: Patient Care Overview  Goal: Plan of Care Review  Plan of care discussed with patient.  LVAD DP and numbers WNL, smooth LVAD hum. Patient has no complaints of pain. Discussed medications and care. Patient has no questions at this time. Will continue to monitor.

## 2018-10-25 NOTE — ASSESSMENT & PLAN NOTE
Symptomatic anemia, Acute blood loss anemia, unclear etiology, presumed GI bleed (prior hx, however no recent melena), doubt hemolysis (LD mildly elevated, Bili wnl):   - Hbg 4.2 (BL ~9) s/p 1u pRBC repeat Hbg 5.3   - INR 2.3 -> 4.4   - Admit to [facility], Dr. Ambrosio   - GI consultation ordered, discuss in AM for EGD / C-scope  - NPO for now  - FOBT pending  - Ordered 2u pRBC (total 3u pRBC)   - Will consider 1u FFP (INR 4.4)  - Follow up CBC, CMP, Mag, INR in AM (use pediatric tubes only)  - Repeat LDH  - 2x large bore access  - Given Lasix 40mg after 2nd unit PRBC  - Hold PO Iron

## 2018-10-25 NOTE — HPI
Mr. Hayden is a 68 yoAAM, [facility] [place] [facility] for symptomatic anemia in the setting of acute blood los anemia due to GI bleed. PMHx of NICM, S/P Heartmate 3 as DT therapy 7/27/17, ICD revision 11/20/17 with Dr. Vidal (DC ICD placed with active RA/LV leads (RV lead capped during revision) that was complicated by pocket hematoma, GI bleed, VT on Amiodarone. Patient is a transfer from [place] ER for acute blood loss anemia. HPI gathered  from spouse at bedside, state patient developed generalized weakness, worsening fatigability and pale appearance past 1 week. He presented to ER with c/o R sided chest pain, which patient describes as sharp shooting in nature, intermittent, 6/10, started. Further work up in ED revealed HBG ~ 4, baseline Hbg 9. Denies any GI bleed, no dark tarry stool, no melena, hematochezia, no hematuria, hemoptysis. Patient is on oral Iron but reports brown stools. Currently denies any chest pain, SOB, N/V/D. No VAD alarms.     Enroute given 1u pRBC. Repeat Hbg 5.3, INR 2.3 -> 4.4.

## 2018-10-25 NOTE — PROGRESS NOTES
Pt transferred to [facility] via bed with RN x2 and on telemetry. Pt VAD on battery upon transfer and connected to external DC upon arrival to room. Pt connected to room monitor. PRBCs currently infusing. Spouse at bedside and updated on plan of care. Dr. Leos and HTS at bedside to assess pt. Vitals stable and pt in no distress at this time.

## 2018-10-25 NOTE — CONSULTS
[facility]  Gastroenterology  Consult Note    Patient Name: Suman Hayden  MRN: [id number]  Admission Date: 10/24/2018  Hospital Length of Stay: 1 days  Code Status: Full Code   Attending Provider: Ortega Leos MD   Consulting Provider: Vince Galindo MD  Primary Care Physician: Joe Ernst MD  Principal Problem:GI bleed    Inpatient consult to Gastroenterology  Consult performed by: Vince Galindo MD  Consult ordered by: Serge Chavarria MD        Subjective:     HPI:  Suman Hayden is a 68 year old man who presented w/ symptomatic anemia in the setting of acute blood loss likely due to GI bleed.  GI was consulted for initial Hb ~4 and hx of GIB.  He was transferred from [place].  MHx includes AICD, LVAD, CHF, CAD, gastric polyp, GI bleed, HTN, HLD, and small bowel resection.  He has had multiple GI bleeds in the past.  Of note, he had GIB in March 2018 w/ no clear source of bleeding found on EGD or colonoscopy, requiring small bowel resection.  In August 2018 he had another likely GI bleed w/ melena, source again not found on EGD and colonoscopy, which self-resolved.  On current presentation, symptoms include generalized weakness, worsening fatigue, pallor, R and L sided CP, abdominal pain, subjective fever, diaphoresis. These symptoms have been going on for the past week but worsened yesterday on 10/24.  His wife reports 2x episodes angélica-brown emesis yesterday prior to admission.  Bowel movements have been normal in color and consistency.  He is chronically constipated and takes miralax PRN, usually having 1x BM daily.  He denies NSAID/ASA use.  He is anticoagulated on warfarin for LVAD (current INR 4.4).  Hb on presentation was 4.2, most recently 5.3.  He is currently receiving his 3rd unit of RBCs.    August 2018 EGD:  - Normal esophagus.  - Normal gastric body.  - Erythematous mucosa in the prepyloric region of the stomach.  - Mucosal nodule found in the duodenum.  - No  specimens collected.  - No signs or sources of bleeding found.    August 2018 colonoscopy:  - Diverticulosis in the sigmoid colon.  - Diverticulosis in the ascending colon.  - One 2 mm polyp in the proximal transverse colon. Resection not attempted.  - Stool in the entire examined colon.  - The examined portion of the ileum was normal.  - No specimens collected.    March 2018 EGD:  - Normal esophagus.  - Esophagogastric landmarks identified.  - Multiple gastric polyps. Resected and retrieved.  - Mucosal changes in the duodenum.  - Normal third portion of the duodenum and fourth portion of the duodenum.  - Normal examined jejunum.    March 2018 colonoscopy:  - Diverticulosis in the sigmoid colon.  - One 3 mm polyp in the proximal transverse colon. Resection not attempted.  - The examined portion of the ileum was normal.  - No specimens collected.    Past Medical History:   Diagnosis Date    Arthritis     Cardiomyopathy     CHF (congestive heart failure)     Coronary artery disease     Encounter for blood transfusion     Gastric polyp     Hyperlipidemia     Hypertension     Hypotension, iatrogenic     Iron deficiency anemia due to chronic blood loss 10/15/2018    LVAD (left ventricular assist device) present     Obesity     S/P implantation of automatic cardioverter/defibrillator (AICD)     Ventricular tachycardia        Past Surgical History:   Procedure Laterality Date    ABDOMINAL SURGERY      APPENDECTOMY      CARDIAC CATHETERIZATION      CARDIAC DEFIBRILLATOR PLACEMENT      CARDIAC DEFIBRILLATOR PLACEMENT      CLOSURE-STERNAL WOUND N/A 7/28/2017    Performed by [facility] Downing MD at [facility] OR 2ND FLR    COLONOSCOPY      COLONOSCOPY N/A 3/9/2018    Procedure: COLONOSCOPY;  Surgeon: Andres Chatterjee MD;  [facility] [place] [address]);  Service: Endoscopy;  Laterality: N/A;    COLONOSCOPY N/A 8/8/2018    Procedure: COLONOSCOPY;  Surgeon: Andres Chatterjee MD;  Location: [facility] ([address] [place] [address]);   Service: Endoscopy;  Laterality: N/A;    COLONOSCOPY N/A 8/8/2018    Performed by Andres Chatterjee MD at [facility] ENDO (2ND FLR)    COLONOSCOPY N/A 3/9/2018    Performed by Andres Chatterjee MD at [facility] ENDO (2ND FLR)    DEVICE ASSISTED ENTEROSCOPY-ANTEROGRADE N/A 1/25/2018    Performed by Andres Chatterjee MD at [facility] ENDO (2ND FLR)    DEVICE ASSISTED ENTEROSCOPY-ANTEROGRADE N/A 12/14/2017    Performed by Andres Chatterjee MD at [facility] ENDO (2ND FLR)    EGD (ESOPHAGOGASTRODUODENOSCOPY) N/A 8/8/2018    Performed by Andres Chatterjee MD at [facility] ENDO (2ND FLR)    ESOPHAGOGASTRODUODENOSCOPY      ESOPHAGOGASTRODUODENOSCOPY N/A 8/8/2018    Procedure: EGD (ESOPHAGOGASTRODUODENOSCOPY);  Surgeon: Andres Chatterjee MD;  Location: [facility] (2ND FLR);  Service: Endoscopy;  Laterality: N/A;    ESOPHAGOGASTRODUODENOSCOPY (EGD) N/A 3/6/2018    Performed by Andres Chatterjee MD at [facility] ENDO (2ND FLR)    ESOPHAGOGASTRODUODENOSCOPY (EGD) N/A 12/8/2017    Performed by Gagna Barger MD at [facility] ENDO (2ND FLR)    ESOPHAGOGASTRODUODENOSCOPY (EGD) N/A 8/17/2017    Performed by Kishore Mills MD at [facility] ENDO (2ND FLR)    EXPLORATORY-LAPAROTOMY with small bowel resection  N/A 3/13/2018    Performed by Kenyon Tellez MD at [facility] OR 2ND FLR    HEART CATH-RIGHT Right 7/3/2017    Performed by Angie Torres MD at [facility] CATH LAB    INSERTION-LEFT VENTRICULAR ASSIST DEVICE N/A 7/27/2017    Performed by Sunny Downing MD at [facility] OR 2ND FLR    INSERTION-LEFT VENTRICULAR ASSIST DEVICE N/A 7/25/2017    Performed by Sunny Downing MD at [facility] OR 2ND FLR    LEFT VENTRICULAR ASSIST DEVICE  07/27/2017    PLACEMENT-NEOPERICARDIUM N/A 7/28/2017    Performed by Sunny Downing MD at [facility] OR 2ND FLR    RESECTION-BOWEL  3/13/2018    Performed by Kenyon Tellez MD at [facility] OR 2ND FLR    Retrograde deivce assisted enteroscopy N/A 1/26/2018    Performed by Jesse Davis MD at [facility] ENDO (2ND FLR)    REVISION-LEAD-ICD N/A 11/20/2017     Performed by Rubén Winchester MD at [facility] CATH LAB    TONSILLECTOMY      TRANSESOPHAGEAL ECHOCARDIOGRAM (GLENN) N/A 1/9/2018    Performed by [facility] Diagnostic Provider at [facility] CATH LAB       Review of patient's allergies indicates:   Allergen Reactions    Asa [aspirin] Other (See Comments)     Bleeding ulcer     Family History     Problem Relation (Age of Onset)    Heart disease Mother, Father        Tobacco Use    Smoking status: Never Smoker    Smokeless tobacco: Never Used   Substance and Sexual Activity    Alcohol use: No    Drug use: No    Sexual activity: Not Currently     Review of Systems   Constitutional: Positive for diaphoresis, fatigue and fever (Subjective).   HENT: Negative for congestion, rhinorrhea and sore throat.    Eyes: Negative for visual disturbance.   Respiratory: Negative for cough and shortness of breath.    Cardiovascular: Positive for chest pain. Negative for palpitations and leg swelling.   Gastrointestinal: Positive for abdominal pain, nausea and vomiting. Negative for abdominal distention, blood in stool, constipation and diarrhea.   Genitourinary: Negative for dysuria, frequency and hematuria.   Musculoskeletal: Negative for arthralgias and myalgias.   Skin: Positive for pallor.   Neurological: Positive for weakness and light-headedness. Negative for headaches.   Psychiatric/Behavioral: Negative for agitation and confusion. The patient is not nervous/anxious.      Objective:     Vital Signs (Most Recent):  Temp: 98.8 °F (37.1 °C) (10/25/18 0915)  Pulse: 90 (10/25/18 0915)  Resp: 18 (10/25/18 0915)  BP: (!) 86/60 (10/25/18 0915)  SpO2: 98 % (10/25/18 0915) Vital Signs (24h Range):  Temp:  [97.6 °F (36.4 °C)-98.9 °F (37.2 °C)] 98.8 °F (37.1 °C)  Pulse:  [] 90  Resp:  [18-20] 18  SpO2:  [91 %-100 %] 98 %  BP: (66-96)/(0-61) 86/60     Weight: 70.5 kg (155 lb 6.8 oz) (10/25/18 0700)  Body mass index is 22.95 kg/m².      Intake/Output Summary (Last 24 hours) at 10/25/2018  0945  Last data filed at 10/25/2018 0853  Gross per 24 hour   Intake 350 ml   Output 1050 ml   Net -700 ml       Lines/Drains/Airways     Line                 VAD 07/27/17 1312 Left ventricular assist device HeartMate 3 454 days          Peripheral Intravenous Line                 Peripheral IV - Single Lumen 10/25/18 0500 Anterior;Right Wrist less than 1 day                Physical Exam   Constitutional: He is oriented to person, place, and time. He appears well-developed and well-nourished. No distress.   HENT:   Head: Normocephalic and atraumatic.   Eyes: EOM are normal.   Conj pallor   Neck: Normal range of motion. Neck supple.   Cardiovascular: Normal rate and regular rhythm.   LVAD hum heard   Pulmonary/Chest: Effort normal and breath sounds normal. No respiratory distress. He has no wheezes. He has no rales.   Abdominal: Soft. Bowel sounds are normal. He exhibits no distension and no mass. There is no tenderness. There is no guarding.   Musculoskeletal: Normal range of motion. He exhibits no edema, tenderness or deformity.   Neurological: He is alert and oriented to person, place, and time. No cranial nerve deficit.   Skin: Skin is warm and dry. He is not diaphoretic. There is pallor.       Significant Labs:  CBC:   Recent Labs   Lab 10/24/18  1519 10/25/18  0040 10/25/18  0856   WBC 14.46* 14.72* 13.41*   HGB 4.2* 5.3* 6.5*   HCT 14.2* 17.9* 21.2*    148* 139*     CMP:   Recent Labs   Lab 10/25/18  0040      CALCIUM 8.5*   ALBUMIN 2.9*   PROT 5.4*   *   K 4.6   CO2 17*      BUN 61*   CREATININE 1.9*   ALKPHOS 43*   ALT 12   AST 15   BILITOT 0.7     Coagulation:   Recent Labs   Lab 10/25/18  0040   INR 4.4*     Liver Function Test:   Recent Labs   Lab 10/24/18  1519 10/25/18  0040   ALT 14 12   AST 26 15   ALKPHOS 39* 43*   BILITOT 0.4 0.7   PROT 5.7* 5.4*   ALBUMIN 3.1* 2.9*       Significant Imaging:  Imaging results within the past 24 hours have been reviewed.    Assessment/Plan:      * GI bleed    68 year old man who presented w/ symptomatic anemia in the setting of acute blood loss likely due to GI bleed.  MHx includes AICD, LVAD, CHF, CAD, gastric polyp, GI bleed, HTN, HLD, and small bowel resection.     Impression  -Likely upper GI bleed in setting of angélica-brown colored vomiting and hx of multiple upper GI bleeds, 1 requiring small bowel resection in 5/2018.  Patient and wife at bedside denying presence of bloody bowel movements and melena.  Prior EGD and colonoscopy notes above in HPI.  -Pt requiring at least 2U RBC at [place], 1U en route to [facility], and 2U at [facility]  -Latest Hb 8.8 up from 4.2 at admission  -Pt anticoagulated on warfarin s/p LVAD placement 7/2017, INR 4.4 at admission now currently 3    Plan  -Will plan for push EGD tomorrow (10/26)  -Clear liquids today, NPO at midnight  -Protonix IV 40mg BID  -Trend H&H, transfuse to keep Hb>7, pt responding well to transfusions  -Anticoagulation management per primary team         Thank you for your consult. I will follow-up with patient. Please contact us if you have any additional questions.    Vince Galindo MD  Gastroenterology  [facility]

## 2018-10-26 NOTE — ASSESSMENT & PLAN NOTE
ROMANA s/p 3 2017:    - No LFA recently on interrogation   - ASA stopped on 12/22/17 after GI bleed and ileal ulcer    - INR 3.5 - continue holding Coumadin  - 's - 200s   - TTE 10/25 at 5200 rpm before getting 4 units PC's: LVEDD 3.7, AV does not open, septum midline. Plan to repeat as an outpatient       Procedure: Device Interrogation Including analysis of device parameters  Current Settings: Ventricular Assist Device  Review of device function shows few PI events    TXP LVAD INTERROGATIONS 10/26/2018 10/26/2018 10/26/2018 10/26/2018 10/26/2018 10/26/2018 10/26/2018   Type HeartMate3 HeartMate3 HeartMate3 HeartMate3 HeartMate3 HeartMate3 HeartMate3   Flow 4.0 3.8 3.8 3.7 3.8 3.7 3.9   Speed 5200 5200 5200 5200 5250 5200 5200   PI 3.8 4.0 4.1 4.0 4.2 4.3 4.0   Power ([place]) 3.6 3.5 3.5 3.6 3.6 3.5 3.5   LSL 4800 4800 4800 4800 4800 4800 4800   Pulsatility Intermittent pulse Intermittent pulse Intermittent pulse Intermittent pulse Intermittent pulse Intermittent pulse Intermittent pulse

## 2018-10-26 NOTE — [employer] PATIENT INSTRUCTIONS
Discharge Summary/Instructions after an Endoscopic Procedure  Patient Name: Suman Hayden  Patient MRN: [id number]  Patient YOB: 1950 Friday, October 26, 2018  Jessica Casillas MD  RESTRICTIONS:  During your procedure today, you received medications for sedation.  These   medications may affect your judgment, balance and coordination.  Therefore,   for 24 hours, you have the following restrictions:   - DO NOT drive a car, operate machinery, make legal/financial decisions,   sign important papers or drink alcohol.    ACTIVITY:  Today: no heavy lifting, straining or running due to procedural   sedation/anesthesia.  The following day: return to full activity including work.  DIET:  Eat and drink normally unless instructed otherwise.     TREATMENT FOR COMMON SIDE EFFECTS:  - Mild abdominal pain, nausea, belching, bloating or excessive gas:  rest,   eat lightly and use a heating pad.  - Sore Throat: treat with throat lozenges and/or gargle with warm salt   water.  - Because air was used during the procedure, expelling large amounts of air   from your rectum or belching is normal.  - If a bowel prep was taken, you may not have a bowel movement for 1-3 days.    This is normal.  SYMPTOMS TO WATCH FOR AND REPORT TO YOUR PHYSICIAN:  1. Abdominal pain or bloating, other than gas cramps.  2. Chest pain.  3. Back pain.  4. Signs of infection such as: chills or fever occurring within 24 hours   after the procedure.  5. Rectal bleeding, which would show as bright red, maroon, or black stools.   (A tablespoon of blood from the rectum is not serious, especially if   hemorrhoids are present.)  6. Vomiting.  7. Weakness or dizziness.  GO DIRECTLY TO THE NEAREST EMERGENCY ROOM IF YOU HAVE ANY OF THE FOLLOWING:      Difficulty breathing              Chills and/or fever over 101 F   Persistent vomiting and/or vomiting blood   Severe abdominal pain   Severe chest pain   Black, tarry stools   Bleeding- more than one tablespoon   Any  other symptom or condition that you feel may need urgent attention  Your doctor recommends these additional instructions:  If any biopsies were taken, your doctors clinic will contact you in 1 to 2   weeks with any results.  - Return patient to ICU for ongoing care.   - The findings and recommendations were discussed with the patient's family.     - Consider repeat EGD w biopsies of abnormal mucosa in duodenum after heart   transplant  - Monitor CBC, trasnfuse as needed.   - Will discuss further evalaution with Dr. Chatterjee   For questions, problems or results please call your physician - Jessica Casillas MD at Work:  [phone].  [facility], EMERGENCY ROOM PHONE NUMBER: [phone]  IF A COMPLICATION OR EMERGENCY SITUATION ARISES AND YOU ARE UNABLE TO REACH   YOUR PHYSICIAN - GO DIRECTLY TO THE EMERGENCY ROOM.  Jessica Casillas MD  10/26/2018 6:50:28 PM  This report has been verified and signed electronically.  [employer]

## 2018-10-26 NOTE — ASSESSMENT & PLAN NOTE
- Appreciate GI's help. To have push EGD today  - INR 3.5 - Coumadin remains on hold  - Off ASA 2/2 h/o GI bleeds

## 2018-10-26 NOTE — TRANSFER OF CARE
"Anesthesia Transfer of Care Note    Patient: Suman Hayden    Procedure(s) Performed: Procedure(s) (LRB):  EGD (ESOPHAGOGASTRODUODENOSCOPY) (N/A)    Patient location: ICU    Anesthesia Type: general    Transport from OR: Transported from OR on 2-3 L/min O2 by NC with adequate spontaneous ventilation    Post pain: adequate analgesia    Post assessment: tolerated procedure well and no apparent anesthetic complications    Post vital signs: stable    Level of consciousness: awake, alert and oriented    Nausea/Vomiting: no nausea/vomiting    Complications: none    Transfer of care protocol was followed      Last vitals:   Visit Vitals  BP 95/69 (BP Location: Left arm, Patient Position: Lying)   Pulse 80   Temp 37.4 °C (99.3 °F) (Oral)   Resp (!) 21   Ht 5' 9" (1.753 m)   Wt 75 kg (165 lb 5.5 oz)   SpO2 100%   BMI 24.42 kg/m²     "

## 2018-10-26 NOTE — PROGRESS NOTES
MD present for bedside EGD. Pt VSS monitored, VAD numbers monitored, see flow sheets for details. Will continue to monitor.

## 2018-10-26 NOTE — ASSESSMENT & PLAN NOTE
Symptomatic anemia, Acute blood loss anemia, unclear etiology, presumed GI bleed (prior hx, however no recent melena), doubt hemolysis (LD mildly elevated, Bili wnl):   - Hbg 4.2 on admit. Up to 8.0 this morning after 4 units PC's yesterday   - INR 4.4 on admit. Holding Coumadin - is trending down at 3.5   - Appreciate GI's help. To have push EGD today  - NPO for now  - FOBT pending (has not had a bowel movement since admit)  - Serial CBC  - Hold PO Iron

## 2018-10-26 NOTE — PLAN OF CARE
Problem: Patient Care Overview  Goal: Plan of Care Review  Outcome: Ongoing (interventions implemented as appropriate)  Pt vitals stable overnight. VAD numbers stable (see flowsheets). No bloody BMs. NPO at midnight for EGD today. POC reviewed with pt and spouse. RN at  to monitor and answer all questions. See flowsheets for full assessment details.

## 2018-10-26 NOTE — PLAN OF CARE
Problem: Patient Care Overview  Goal: Plan of Care Review  Plan of care reviewed with pt and family. Pt AAOx4. Pt on RA maintaining sats >100%.  Pt remains afebrile this shift. VAD numbers stable, see flow sheet for details. No bloody bowel movements this shift. No new skin breakdown noted. All questions answered per RN. Plans to step down this evening.VSS, will continue to monitor.

## 2018-10-26 NOTE — SUBJECTIVE & OBJECTIVE
**Interval History: Hgb up to 8 this morning after 4 units PC's yesterday. No bowel movement since admit. To have push EGD today    Continuous Infusions:  Scheduled Meds:   amiodarone  400 mg Oral Daily    dronabinol  5 mg Oral TID    mirtazapine  7.5 mg Oral Nightly    [START ON 10/27/2018] pantoprazole  40 mg Oral BID    pantoprazole  40 mg Intravenous BID    pravastatin  20 mg Oral QHS    sodium chloride 0.9%  3 mL Intravenous Q8H     PRN Meds:sodium chloride, sodium chloride, HYDROcodone-acetaminophen, polyethylene glycol    Review of patient's allergies indicates:   Allergen Reactions    Asa [aspirin] Other (See Comments)     Bleeding ulcer     Objective:     Vital Signs (Most Recent):  Temp: 99 °F (37.2 °C) (10/26/18 0700)  Pulse: 80 (10/26/18 1015)  Resp: 18 (10/26/18 1015)  BP: (!) 105/50 (10/26/18 1000)  SpO2: 98 % (10/26/18 0800) Vital Signs (24h Range):  Temp:  [98.2 °F (36.8 °C)-99.7 °F (37.6 °C)] 99 °F (37.2 °C)  Pulse:  [79-80] 80  Resp:  [12-38] 18  SpO2:  [97 %-100 %] 98 %  BP: ()/(50-81) 105/50     Patient Vitals for the past 72 hrs (Last 3 readings):   Weight   10/26/18 0500 75 kg (165 lb 5.5 oz)   10/25/18 0700 70.5 kg (155 lb 6.8 oz)     Body mass index is 24.42 kg/m².      Intake/Output Summary (Last 24 hours) at 10/26/2018 1120  Last data filed at 10/26/2018 0800  Gross per 24 hour   Intake 1611.9 ml   Output 2025 ml   Net -413.1 ml       Hemodynamic Parameters:       Telemetry: SR, V paced    Physical Exam   Constitutional: He is oriented to person, place, and time. He appears well-developed and well-nourished.   HENT:   Head: Normocephalic and atraumatic.   Eyes: Conjunctivae and EOM are normal. Pupils are equal, round, and reactive to light.   Neck: Normal range of motion. Neck supple. No JVD present. No thyromegaly present.   Cardiovascular: Normal rate and regular rhythm.   Smooth VAD hum. Intermittently pulsatile   Pulmonary/Chest: Effort normal and breath sounds normal.    Abdominal: Soft. Bowel sounds are normal.   Musculoskeletal: Normal range of motion. He exhibits no edema.   Neurological: He is alert and oriented to person, place, and time.   Skin: Skin is warm and dry. Capillary refill takes 2 to 3 seconds.   Psychiatric: He has a normal mood and affect. His behavior is normal. Judgment and thought content normal.       Significant Labs:  CBC:  Recent Labs   Lab 10/25/18  1450 10/25/18  2100 10/26/18  0400   WBC 13.50* 10.89 10.03   RBC 3.17* 2.81* 2.88*   HGB 8.8* 7.9* 8.0*   HCT 27.5* 24.2* 24.6*   * 103* 116*   MCV 87 86 85   MCH 27.8 28.1 27.8   MCHC 32.0 32.6 32.5     BNP:  Recent Labs   Lab 10/24/18  1519 10/26/18  0400   * 123*     CMP:  Recent Labs   Lab 10/24/18  1519 10/25/18  0040 10/25/18  0856 10/26/18  0400   * 109 103 87   CALCIUM 8.6* 8.5* 8.5* 8.1*   ALBUMIN 3.1* 2.9*  --  2.8*   PROT 5.7* 5.4*  --  5.5*    133* 135* 136   K 4.2 4.6 4.0 4.6   CO2 13* 17* 21* 18*    108 108 111*   BUN 47* 61* 63* 46*   CREATININE 1.5* 1.9* 1.6* 1.3   ALKPHOS 39* 43*  --  42*   ALT 14 12  --  17   AST 26 15  --  38   BILITOT 0.4 0.7  --  0.7      Coagulation:   Recent Labs   Lab 10/25/18  0040 10/25/18  0856 10/26/18  0400   INR 4.4* 3.6* 3.5*   APTT  --  28.2 28.1     LDH:  Recent Labs   Lab 10/25/18  0040 10/25/18  0856 10/26/18  0400   * 145 264*     Microbiology:  Microbiology Results (last 7 days)     ** No results found for the last 168 hours. **          I have reviewed all pertinent labs within the past 24 hours.    Estimated Creatinine Clearance: 54.4 mL/min (based on SCr of 1.3 mg/dL).    Diagnostic Results:  I have reviewed all pertinent imaging results/findings within the past 24 hours.

## 2018-10-26 NOTE — PROGRESS NOTES
Heart Failure notified of results of pt's 2100 CBC. No new orders given. Will continue to monitor closely.

## 2018-10-26 NOTE — PROGRESS NOTES
[facility]  Heart Transplant  Progress Note    Patient Name: Suman Hayden  MRN: [id number]  Admission Date: 10/24/2018  Hospital Length of Stay: 2 days  Attending Physician: Ortega Leos MD  Primary Care Provider: Joe Ernst MD  Principal Problem:GI bleed    Subjective:     **Interval History: Hgb up to 8 this morning after 4 units PC's yesterday. No bowel movement since admit. To have push EGD today    Continuous Infusions:  Scheduled Meds:   amiodarone  400 mg Oral Daily    dronabinol  5 mg Oral TID    mirtazapine  7.5 mg Oral Nightly    [START ON 10/27/2018] pantoprazole  40 mg Oral BID    pantoprazole  40 mg Intravenous BID    pravastatin  20 mg Oral QHS    sodium chloride 0.9%  3 mL Intravenous Q8H     PRN Meds:sodium chloride, sodium chloride, HYDROcodone-acetaminophen, polyethylene glycol    Review of patient's allergies indicates:   Allergen Reactions    Asa [aspirin] Other (See Comments)     Bleeding ulcer     Objective:     Vital Signs (Most Recent):  Temp: 99 °F (37.2 °C) (10/26/18 0700)  Pulse: 80 (10/26/18 1015)  Resp: 18 (10/26/18 1015)  BP: (!) 105/50 (10/26/18 1000)  SpO2: 98 % (10/26/18 0800) Vital Signs (24h Range):  Temp:  [98.2 °F (36.8 °C)-99.7 °F (37.6 °C)] 99 °F (37.2 °C)  Pulse:  [79-80] 80  Resp:  [12-38] 18  SpO2:  [97 %-100 %] 98 %  BP: ()/(50-81) 105/50     Patient Vitals for the past 72 hrs (Last 3 readings):   Weight   10/26/18 0500 75 kg (165 lb 5.5 oz)   10/25/18 0700 70.5 kg (155 lb 6.8 oz)     Body mass index is 24.42 kg/m².      Intake/Output Summary (Last 24 hours) at 10/26/2018 1120  Last data filed at 10/26/2018 0800  Gross per 24 hour   Intake 1611.9 ml   Output 2025 ml   Net -413.1 ml       Hemodynamic Parameters:       Telemetry: SR, V paced    Physical Exam   Constitutional: He is oriented to person, place, and time. He appears well-developed and well-nourished.   HENT:   Head: Normocephalic and atraumatic.   Eyes: Conjunctivae  and EOM are normal. Pupils are equal, round, and reactive to light.   Neck: Normal range of motion. Neck supple. No JVD present. No thyromegaly present.   Cardiovascular: Normal rate and regular rhythm.   Smooth VAD hum. Intermittently pulsatile   Pulmonary/Chest: Effort normal and breath sounds normal.   Abdominal: Soft. Bowel sounds are normal.   Musculoskeletal: Normal range of motion. He exhibits no edema.   Neurological: He is alert and oriented to person, place, and time.   Skin: Skin is warm and dry. Capillary refill takes 2 to 3 seconds.   Psychiatric: He has a normal mood and affect. His behavior is normal. Judgment and thought content normal.       Significant Labs:  CBC:  Recent Labs   Lab 10/25/18  1450 10/25/18  2100 10/26/18  0400   WBC 13.50* 10.89 10.03   RBC 3.17* 2.81* 2.88*   HGB 8.8* 7.9* 8.0*   HCT 27.5* 24.2* 24.6*   * 103* 116*   MCV 87 86 85   MCH 27.8 28.1 27.8   MCHC 32.0 32.6 32.5     BNP:  Recent Labs   Lab 10/24/18  1519 10/26/18  0400   * 123*     CMP:  Recent Labs   Lab 10/24/18  1519 10/25/18  0040 10/25/18  0856 10/26/18  0400   * 109 103 87   CALCIUM 8.6* 8.5* 8.5* 8.1*   ALBUMIN 3.1* 2.9*  --  2.8*   PROT 5.7* 5.4*  --  5.5*    133* 135* 136   K 4.2 4.6 4.0 4.6   CO2 13* 17* 21* 18*    108 108 111*   BUN 47* 61* 63* 46*   CREATININE 1.5* 1.9* 1.6* 1.3   ALKPHOS 39* 43*  --  42*   ALT 14 12  --  17   AST 26 15  --  38   BILITOT 0.4 0.7  --  0.7      Coagulation:   Recent Labs   Lab 10/25/18  0040 10/25/18  0856 10/26/18  0400   INR 4.4* 3.6* 3.5*   APTT  --  28.2 28.1     LDH:  Recent Labs   Lab 10/25/18  0040 10/25/18  0856 10/26/18  0400   * 145 264*     Microbiology:  Microbiology Results (last 7 days)     ** No results found for the last 168 hours. **          I have reviewed all pertinent labs within the past 24 hours.    Estimated Creatinine Clearance: 54.4 mL/min (based on SCr of 1.3 mg/dL).    Diagnostic Results:  I have reviewed all  pertinent imaging results/findings within the past 24 hours.    Assessment and Plan:     Mr. Hayden is a 68 yoAAM, admitted to [facility] for symptomatic anemia in the setting of acute blood los anemia due to GI bleed. PMHx of NICM, S/P Heartmate 3 as DT therapy 7/27/17, ICD revision 11/20/17 with Dr. Vidal (DC ICD placed with active RA/LV leads (RV lead capped during revision) that was complicated by pocket hematoma, GI bleed, VT on Amiodarone. Patient is a transfer from [place] ER for acute blood loss anemia. HPI gathered  from spouse at bedside, state patient developed generalized weakness, worsening fatigability and pale appearance past 1 week. He presented to ER with c/o R sided chest pain, which patient describes as sharp shooting in nature, intermittent, 6/10, started. Further work up in ED revealed HBG ~ 4, baseline Hbg 9. Denies any GI bleed, no dark tarry stool, no melena, hematochezia, no hematuria, hemoptysis. Patient is on oral Iron but reports brown stools. Currently denies any chest pain, SOB, N/V/D. No VAD alarms.     Enroute given 1u pRBC. Repeat Hbg 5.3, INR 2.3 -> 4.4.             * GI bleed    - Appreciate GI's help. To have push EGD today  - INR 3.5 - Coumadin remains on hold  - Off ASA 2/2 h/o GI bleeds     Anemia    Symptomatic anemia, Acute blood loss anemia, unclear etiology, presumed GI bleed (prior hx, however no recent melena), doubt hemolysis (LD mildly elevated, Bili wnl):   - Hbg 4.2 on admit. Up to 8.0 this morning after 4 units PC's yesterday   - INR 4.4 on admit. Holding Coumadin - is trending down at 3.5   - Appreciate GI's help. To have push EGD today  - NPO for now  - FOBT pending (has not had a bowel movement since admit)  - Serial CBC  - Hold PO Iron        LVAD (left ventricular assist device) present    [facility] s/p HM3 2017:    - No LFA recently on interrogation   - ASA stopped on 12/22/17 after GI bleed and ileal ulcer    - INR 3.5 - continue holding Coumadin  - 's -  200s   - TTE 10/25 at 5200 rpm before getting 4 units PC's: LVEDD 3.7, AV does not open, septum midline. Plan to repeat as an outpatient       Procedure: Device Interrogation Including analysis of device parameters  Current Settings: Ventricular Assist Device  Review of device function shows few PI events    TXP LVAD INTERROGATIONS 10/26/2018 10/26/2018 10/26/2018 10/26/2018 10/26/2018 10/26/2018 10/26/2018   Type HeartMate3 HeartMate3 HeartMate3 HeartMate3 HeartMate3 HeartMate3 HeartMate3   Flow 4.0 3.8 3.8 3.7 3.8 3.7 3.9   Speed 5200 5200 5200 5200 5250 5200 5200   PI 3.8 4.0 4.1 4.0 4.2 4.3 4.0   Power ([place]) 3.6 3.5 3.5 3.6 3.6 3.5 3.5   LSL 4800 4800 4800 4800 4800 4800 4800   Pulsatility Intermittent pulse Intermittent pulse Intermittent pulse Intermittent pulse Intermittent pulse Intermittent pulse Intermittent pulse        INDIRA (acute kidney injury)    INDIRA (Cr 1.9 on admit, down to 1.3 today, baseline 1.1):   Likely in the setting of hypoperfusion   - Hold nephrotoxic medications       VT (ventricular tachycardia)    Continue PTA Amiodarone  Caution previously developed VT with anemia     Essential hypertension    Holding PTA anti-HTNs, resume when feasible   Caution with GI bleed      Nonischemic cardiomyopathy    S/p LVAD    - LVEDD 6.2 / LVef 25%  - see VAD plan        Uninterrupted Critical Care/Counseling Time (not including procedures): 45 minutes      Loreta Dunbar NP [id number]  Heart Transplant  [facility]

## 2018-10-27 NOTE — ASSESSMENT & PLAN NOTE
[facility] s/p 3 2017:   - No LFA recently on interrogation   - ASA stopped on 12/22/17 after GI bleed and ileal ulcer    - INR 2.8 - Coumadin 1mg x1 today  - 's - 200s   - TTE 10/25 at 5200 rpm before getting 4 units PC's: LVEDD 3.7, AV does not open, septum midline. Plan to repeat as an outpatient       Procedure: Device Interrogation Including analysis of device parameters  Current Settings: Ventricular Assist Device  Review of device function shows few PI events    TXP LVAD INTERROGATIONS 10/27/2018 10/27/2018 10/27/2018 10/27/2018 10/26/2018 10/26/2018 10/26/2018   Type (No Data) HeartMate3 HeartMate3 HeartMate3 HeartMate3 HeartMate3 HeartMate3   Flow - 4.0 3.5 4.0 3.7 3.7 3.8   Speed - 5200 5200 5200 5200 5200 5200   PI - 3.8 5.2 3.8 4.2 3.9 3.7   Power ([place]) - 3.7 3.5 3.4 3.5 3.5 3.5   LSL - 4800 - - - 4800 4800   Pulsatility - No Pulse - - - Intermittent pulse Intermittent pulse

## 2018-10-27 NOTE — NURSING TRANSFER
Nursing Transfer Note      10/26/2018     Transfer To: 310 CTSU    Transfer via wheelchair    Transfer with cardiac monitoring    Transported by RN + assist    Medicines sent: None    Chart send with patient: Yes    Notified: spouse    Patient reassessed at: 8793 10/26/2018    Upon arrival to floor: cardiac monitor applied, patient oriented to room, call bell in reach and bed in lowest position

## 2018-10-27 NOTE — ASSESSMENT & PLAN NOTE
Symptomatic anemia, Acute blood loss anemia, unclear etiology, presumed GI bleed (prior hx, however no recent melena), doubt hemolysis (LD mildly elevated, Bili wnl):  - Hbg 4.2 on admit. Trending up after 4 units PC's yesterday  - INR 4.4 on admit. Holding Coumadin - is trending down at 2.8. Resume low dose coumadin  - H/H stable

## 2018-10-27 NOTE — PROGRESS NOTES
[facility]  Heart Transplant  Progress Note    Patient Name: Suman Hayden  MRN: [id number]  Admission Date: 10/24/2018  Hospital Length of Stay: 3 days  Attending Physician: Ortega Leos MD  Primary Care Provider: Joe Ernst MD  Principal Problem:GI bleed    Subjective:     Interval History:    Had negative push enteroscopy that was negative for source yesterday. Has not had any BM for the last three days but feels better with transfusion. H/H stable around 7.5. GI prefer fu Vera with VCE/DBE if re- bleeding restart. Requesting regular diet    Continuous Infusions:  Scheduled Meds:   amiodarone  400 mg Oral Daily    dronabinol  5 mg Oral TID    lisinopril  2.5 mg Oral Daily    mirtazapine  7.5 mg Oral Nightly    pantoprazole  40 mg Oral BID    pravastatin  20 mg Oral QHS    sodium chloride 0.9%  3 mL Intravenous Q8H    warfarin  1 mg Oral Daily     PRN Meds:sodium chloride, sodium chloride, HYDROcodone-acetaminophen, polyethylene glycol    Review of patient's allergies indicates:   Allergen Reactions    Asa [aspirin] Other (See Comments)     Bleeding ulcer     Objective:     Vital Signs (Most Recent):  Temp: 98.6 °F (37 °C) (10/27/18 1141)  Pulse: 80 (10/27/18 1141)  Resp: 19 (10/27/18 0752)  BP: (!) 86/0 (10/27/18 0752)  SpO2: 100 % (10/27/18 1141) Vital Signs (24h Range):  Temp:  [97.2 °F (36.2 °C)-98.6 °F (37 °C)] 98.6 °F (37 °C)  Pulse:  [62-82] 80  Resp:  [10-28] 19  SpO2:  [98 %-100 %] 100 %  BP: ()/(0-82) 86/0     Patient Vitals for the past 72 hrs (Last 3 readings):   Weight   10/27/18 0800 69 kg (152 lb 1.9 oz)   10/26/18 0500 75 kg (165 lb 5.5 oz)   10/25/18 0700 70.5 kg (155 lb 6.8 oz)     Body mass index is 22.46 kg/m².      Intake/Output Summary (Last 24 hours) at 10/27/2018 1152  Last data filed at 10/27/2018 0630  Gross per 24 hour   Intake 250 ml   Output 725 ml   Net -475 ml       Hemodynamic Parameters:       Telemetry: no event noted    Physical Exam    Constitutional: He is oriented to person, place, and time. He appears well-developed.   Eyes: No scleral icterus.   Neck: No JVD present.   Cardiovascular:   Smooth LVAD hum- intermittent pulse   Pulmonary/Chest: Effort normal and breath sounds normal.   Abdominal: Soft. Bowel sounds are normal. He exhibits no mass. There is no tenderness. There is no rebound.   Musculoskeletal: He exhibits no edema or tenderness.   Neurological: He is alert and oriented to person, place, and time.   Skin: Skin is warm and dry.   Psychiatric:   Mild flat affect   Nursing note and vitals reviewed.     Significant Labs:  CBC:  Recent Labs   Lab 10/26/18  1227 10/26/18  2036 10/27/18  0939   WBC 8.13 7.39 6.93   RBC 2.84* 2.68* 2.69*   HGB 7.9* 7.3* 7.5*   HCT 24.8* 24.3* 23.6*   * 98* 94*   MCV 87 91 88   MCH 27.8 27.2 27.9   MCHC 31.9* 30.0* 31.8*     BNP:  Recent Labs   Lab 10/24/18  1519 10/26/18  0400   * 123*     CMP:  Recent Labs   Lab 10/24/18  1519 10/25/18  0040 10/25/18  0856 10/26/18  0400 10/27/18  0456   * 109 103 87 73   CALCIUM 8.6* 8.5* 8.5* 8.1* 8.3*   ALBUMIN 3.1* 2.9*  --  2.8*  --    PROT 5.7* 5.4*  --  5.5*  --     133* 135* 136 140   K 4.2 4.6 4.0 4.6 3.9   CO2 13* 17* 21* 18* 22*    108 108 111* 112*   BUN 47* 61* 63* 46* 26*   CREATININE 1.5* 1.9* 1.6* 1.3 0.9   ALKPHOS 39* 43*  --  42*  --    ALT 14 12  --  17  --    AST 26 15  --  38  --    BILITOT 0.4 0.7  --  0.7  --       Coagulation:   Recent Labs   Lab 10/25/18  0856 10/26/18  0400 10/27/18  0456   INR 3.6* 3.5* 2.8*   APTT 28.2 28.1 27.8     LDH:  Recent Labs   Lab 10/25/18  0040 10/25/18  0856 10/26/18  0400 10/27/18  0456   * 145 264* 156     Microbiology:  Microbiology Results (last 7 days)     ** No results found for the last 168 hours. **          I have reviewed all pertinent labs within the past 24 hours.    Estimated Creatinine Clearance: 76.7 mL/min (based on SCr of 0.9 mg/dL).    Diagnostic Results:  I  have reviewed and interpreted all pertinent imaging results/findings within the past 24 hours.        Assessment and Plan:     Mr. Hayden is a 68 yoAAM, admitted to [facility] for symptomatic anemia in the setting of acute blood los anemia due to GI bleed. PMHx of NICM, S/P Heartmate 3 as DT therapy 7/27/17, ICD revision 11/20/17 with Dr. Vidal (DC ICD placed with active RA/LV leads (RV lead capped during revision) that was complicated by pocket hematoma, GI bleed, VT on Amiodarone. Patient is a transfer from [place] ER for acute blood loss anemia. HPI gathered  from spouse at bedside, state patient developed generalized weakness, worsening fatigability and pale appearance past 1 week. He presented to ER with c/o R sided chest pain, which patient describes as sharp shooting in nature, intermittent, 6/10, started. Further work up in ED revealed HBG ~ 4, baseline Hbg 9. Denies any GI bleed, no dark tarry stool, no melena, hematochezia, no hematuria, hemoptysis. Patient is on oral Iron but reports brown stools. Currently denies any chest pain, SOB, N/V/D. No VAD alarms.     Enroute given 1u pRBC. Repeat Hbg 5.3, INR 2.3 -> 4.4.             * GI bleed    - Appreciate GI's help. GI Push enteroscopy negative for source  - INR 2.8 - Coumadin 1mg oral today  - Off ASA 2/2 h/o GI bleeds  - GI to do DBE/VCE if rebleeding happens     Anemia    Symptomatic anemia, Acute blood loss anemia, unclear etiology, presumed GI bleed (prior hx, however no recent melena), doubt hemolysis (LD mildly elevated, Bili wnl):  - Hbg 4.2 on admit. Trending up after 4 units PC's yesterday  - INR 4.4 on admit. trending down and 2.8 today. Home dose 2mg daily  - H/H stable     LVAD (left ventricular assist device) present    NICMP s/p HM3 2017:   - No LFA recently on interrogation   - ASA stopped on 12/22/17 after GI bleed and ileal ulcer    - INR 2.8 - Coumadin 1mg x1 today  - 's - 200s   - TTE 10/25 at 5200 rpm before getting 4 units PC's:  LVEDD 3.7, AV does not open, septum midline. Plan to repeat as an outpatient       Procedure: Device Interrogation Including analysis of device parameters  Current Settings: Ventricular Assist Device  Review of device function shows few PI events    TXP LVAD INTERROGATIONS 10/27/2018 10/27/2018 10/27/2018 10/27/2018 10/26/2018 10/26/2018 10/26/2018   Type (No Data) HeartMate3 HeartMate3 HeartMate3 HeartMate3 HeartMate3 HeartMate3   Flow - 4.0 3.5 4.0 3.7 3.7 3.8   Speed - 5200 5200 5200 5200 5200 5200   PI - 3.8 5.2 3.8 4.2 3.9 3.7   Power ([place]) - 3.7 3.5 3.4 3.5 3.5 3.5   LSL - 4800 - - - 4800 4800   Pulsatility - No Pulse - - - Intermittent pulse Intermittent pulse        INDIRA (acute kidney injury)    INDIRA (Cr 1.9 on admit, down to 1.3 today, baseline 1.1):   Likely in the setting of hypoperfusion   - Hold nephrotoxic medications       VT (ventricular tachycardia)    Continue PTA Amiodarone  Caution previously developed VT with anemia  Cannot tolerate  mexitil     Essential hypertension    Resume lisinopril 2.5mg BID for elevated dopplers this morning       Nonischemic cardiomyopathy    S/p LVAD    - LVEDD 6.2 / LVef 25%  - see VAD plan          Baljinder Negrete MD  Heart Transplant  [facility]

## 2018-10-27 NOTE — ASSESSMENT & PLAN NOTE
- Appreciate GI's help. GI Push enteroscopy negative for source  - INR 2.8 - Coumadin 1mg oral today  - Off ASA 2/2 h/o GI bleeds  - GI to do DBE/VCE if rebleeding happens

## 2018-10-27 NOTE — TREATMENT PLAN
GI Treatment Plan    Suman Hayden is a 68 y.o. male admitted to hospital 10/24/2018 (Hospital Day: 3) due to GI bleed.     Interval History  - push enteroscopy today without evidence of bleeding source  - H&H improved appropriately with transfusions but downtrending on 10/26 vs fluctuation of lab. No noted episodes of bleeding overnight.    Objective  Temp:  [98.4 °F (36.9 °C)-99.3 °F (37.4 °C)] 98.4 °F (36.9 °C) (10/26 1900)  Pulse:  [79-80] 80 (10/26 2003)  BP: ()/(50-81) 113/73 (10/26 1900)  Resp:  [10-38] 20 (10/26 2003)  SpO2:  [97 %-100 %] 100 % (10/26 1900)    Laboratory    Recent Labs   Lab 10/26/18  0400 10/26/18  1227 10/26/18  2036   HGB 8.0* 7.9* 7.3*       Lab Results   Component Value Date    WBC 7.39 10/26/2018    HGB 7.3 (L) 10/26/2018    HCT 24.3 (L) 10/26/2018    MCV 91 10/26/2018    PLT 98 (L) 10/26/2018       Lab Results   Component Value Date     10/26/2018    K 4.6 10/26/2018     (H) 10/26/2018    CO2 18 (L) 10/26/2018    BUN 46 (H) 10/26/2018    CREATININE 1.3 10/26/2018    CALCIUM 8.1 (L) 10/26/2018    ANIONGAP 7 (L) 10/26/2018    ESTGFRAFRICA >60.0 10/26/2018    EGFRNONAA 56.1 (A) 10/26/2018       Lab Results   Component Value Date    ALT 17 10/26/2018    AST 38 10/26/2018    ALKPHOS 42 (L) 10/26/2018    BILITOT 0.7 10/26/2018       Lab Results   Component Value Date    INR 3.5 (H) 10/26/2018    INR 3.6 (H) 10/25/2018    INR 4.4 (H) 10/25/2018       Plan  - abnormal mucosa in duodenum. Not biopsied 2/2 elevated INR (3.5). Can consider re-evaluating in future if patient able to come off anticoagluation  - will monitor over weekend and discuss further evaluation if persistent bleeding such as VCE/patency capsule (hx of ileal resection) vs DBE  - Plan of care was discussed with primary team.  - We will continue to follow.    Thank you for involving us in the care of Suman Hayden. Please call with any additional questions, concerns or changes in the patient's clinical  status.    Karri Carrera MD  Gastroenterology Fellow, PGY IV  Pager: [phone]

## 2018-10-27 NOTE — PLAN OF CARE
Problem: Patient Care Overview  Goal: Plan of Care Review  Outcome: Ongoing (interventions implemented as appropriate)  Patient free from falls and injuries throughout shift.  AAO and VSS.  Patient denies chest pain and SOB.   Patient continues on amiodarone 400mg daily and pantoprazole PO 40mg BID. VAD working WNL.  Enteroscopy completed 10/26/2018 evaluation without bleeding source.  Patient on clear liquid diet.  K+ 4.6, Mg 2.7, BUN/Cre 46/1.3, INR 3.5.   No acute events overnight. Patient resting well at this time.  Plan of care discussed with patient.  Patient verbalizes understanding.  Will continue to monitor.

## 2018-10-27 NOTE — SUBJECTIVE & OBJECTIVE
Interval History:    Had negative push enteroscopy that was negative for source yesterday. Has not had any BM for the last three days but feels better with transfusion. H/H stable around 7.5. GI prefer fu Vera with VCE/DBE if re- bleeding restart. Requesting regular diet    Continuous Infusions:  Scheduled Meds:   amiodarone  400 mg Oral Daily    dronabinol  5 mg Oral TID    lisinopril  2.5 mg Oral Daily    mirtazapine  7.5 mg Oral Nightly    pantoprazole  40 mg Oral BID    pravastatin  20 mg Oral QHS    sodium chloride 0.9%  3 mL Intravenous Q8H    warfarin  1 mg Oral Daily     PRN Meds:sodium chloride, sodium chloride, HYDROcodone-acetaminophen, polyethylene glycol    Review of patient's allergies indicates:   Allergen Reactions    Asa [aspirin] Other (See Comments)     Bleeding ulcer     Objective:     Vital Signs (Most Recent):  Temp: 98.6 °F (37 °C) (10/27/18 1141)  Pulse: 80 (10/27/18 1141)  Resp: 19 (10/27/18 0752)  BP: (!) 86/0 (10/27/18 0752)  SpO2: 100 % (10/27/18 1141) Vital Signs (24h Range):  Temp:  [97.2 °F (36.2 °C)-98.6 °F (37 °C)] 98.6 °F (37 °C)  Pulse:  [62-82] 80  Resp:  [10-28] 19  SpO2:  [98 %-100 %] 100 %  BP: ()/(0-82) 86/0     Patient Vitals for the past 72 hrs (Last 3 readings):   Weight   10/27/18 0800 69 kg (152 lb 1.9 oz)   10/26/18 0500 75 kg (165 lb 5.5 oz)   10/25/18 0700 70.5 kg (155 lb 6.8 oz)     Body mass index is 22.46 kg/m².      Intake/Output Summary (Last 24 hours) at 10/27/2018 1152  Last data filed at 10/27/2018 0630  Gross per 24 hour   Intake 250 ml   Output 725 ml   Net -475 ml       Hemodynamic Parameters:       Telemetry: no event noted    Physical Exam   Constitutional: He is oriented to person, place, and time. He appears well-developed.   Eyes: No scleral icterus.   Neck: No JVD present.   Cardiovascular:   Smooth LVAD hum- intermittent pulse   Pulmonary/Chest: Effort normal and breath sounds normal.   Abdominal: Soft. Bowel sounds are normal. He  exhibits no mass. There is no tenderness. There is no rebound.   Musculoskeletal: He exhibits no edema or tenderness.   Neurological: He is alert and oriented to person, place, and time.   Skin: Skin is warm and dry.   Psychiatric:   Mild flat affect   Nursing note and vitals reviewed.     Significant Labs:  CBC:  Recent Labs   Lab 10/26/18  1227 10/26/18  2036 10/27/18  0939   WBC 8.13 7.39 6.93   RBC 2.84* 2.68* 2.69*   HGB 7.9* 7.3* 7.5*   HCT 24.8* 24.3* 23.6*   * 98* 94*   MCV 87 91 88   MCH 27.8 27.2 27.9   MCHC 31.9* 30.0* 31.8*     BNP:  Recent Labs   Lab 10/24/18  1519 10/26/18  0400   * 123*     CMP:  Recent Labs   Lab 10/24/18  1519 10/25/18  0040 10/25/18  0856 10/26/18  0400 10/27/18  0456   * 109 103 87 73   CALCIUM 8.6* 8.5* 8.5* 8.1* 8.3*   ALBUMIN 3.1* 2.9*  --  2.8*  --    PROT 5.7* 5.4*  --  5.5*  --     133* 135* 136 140   K 4.2 4.6 4.0 4.6 3.9   CO2 13* 17* 21* 18* 22*    108 108 111* 112*   BUN 47* 61* 63* 46* 26*   CREATININE 1.5* 1.9* 1.6* 1.3 0.9   ALKPHOS 39* 43*  --  42*  --    ALT 14 12  --  17  --    AST 26 15  --  38  --    BILITOT 0.4 0.7  --  0.7  --       Coagulation:   Recent Labs   Lab 10/25/18  0856 10/26/18  0400 10/27/18  0456   INR 3.6* 3.5* 2.8*   APTT 28.2 28.1 27.8     LDH:  Recent Labs   Lab 10/25/18  0040 10/25/18  0856 10/26/18  0400 10/27/18  0456   * 145 264* 156     Microbiology:  Microbiology Results (last 7 days)     ** No results found for the last 168 hours. **          I have reviewed all pertinent labs within the past 24 hours.    Estimated Creatinine Clearance: 76.7 mL/min (based on SCr of 0.9 mg/dL).    Diagnostic Results:  I have reviewed and interpreted all pertinent imaging results/findings within the past 24 hours.

## 2018-10-27 NOTE — PROGRESS NOTES
10/27/18 0430 10/27/18 0431   Vital Signs   /82 (!) 96/0   MAP (mmHg) 92 --    BP Location Left arm Left arm   BP Method Automatic Doppler   Patient Position Lying Lying     MD Aura notified of above VS.  Order for hydralazine 25 mg PO x1.  Will continue to monitor.

## 2018-10-28 NOTE — ASSESSMENT & PLAN NOTE
ROMANA s/p 3 2017:   - No LFA recently on interrogation   - ASA stopped on 12/22/17 after GI bleed and ileal ulcer    - INR 2.0 - Coumadin 1mg x1 today  - 's - 200s   - TTE 10/25 at 5200 rpm before getting 4 units PC's: LVEDD 3.7, AV does not open, septum midline. Plan to repeat as an outpatient       Procedure: Device Interrogation Including analysis of device parameters  Current Settings: Ventricular Assist Device  Review of device function shows few PI events    TXP LVAD INTERROGATIONS 10/28/2018 10/28/2018 10/27/2018 10/27/2018 10/27/2018 10/27/2018 10/27/2018   Type HeartMate3 HeartMate3 HeartMate3 HeartMate3 HeartMate3 HeartMate3 (No Data)   Flow 4.3 4.4 4.4 4.1 4.1 4.2 -   Speed 5200 5200 5200 5200 5200 5200 -   PI 2.4 2.3 1.9 3.8 3.8 3.5 -   Power ([place]) 3.6 3.5 3.5 3.5 3.4 3.5 -   LSL 4800 4800 4800 4800 - - -   Pulsatility Intermittent pulse - - - Intermittent pulse Intermittent pulse -

## 2018-10-28 NOTE — PLAN OF CARE
Problem: Patient Care Overview  Goal: Plan of Care Review  Outcome: Ongoing (interventions implemented as appropriate)  Pt free from falls and injury during shift. H+H 7.3/23.6 CBC ordered q12hr. Pt monitored for bleeding. Pt had BM today, black tarry stool. Doppler and VAD WNL. INR 2.0. Gastro consulted. VSS, pt voiced no complaints. POC updated with pt, will continue to monitor.

## 2018-10-28 NOTE — HOSPITAL COURSE
10/28  - had push enteroscopy three days ago that was negative. Coumadin restarted and INR 2.6. Had melena this morning and seen by GI who will advise if they will pursue DBE  10/29: INR 1.5, Pt no bridge. Will continue to monitor. 1 PRBC on 10/28.  10/30: Patency capsule by GI.

## 2018-10-28 NOTE — NURSING
Pt doppler 68/0, MAP- 59, Dr. Ibanez notified. 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

## 2018-10-28 NOTE — SUBJECTIVE & OBJECTIVE
Interval History:     Denies acute overnight event but had melena this morning. He denied dizziness, SOB, chest pain,  abd pain or LVAD alarm. Coumadin 1mg restarted yesterday and INR 2.0 today.  GI evaluated patient this morning and will advise if they will pursue DBE    Continuous Infusions:  Scheduled Meds:   amiodarone  400 mg Oral Daily    dronabinol  5 mg Oral TID    lisinopril  2.5 mg Oral Daily    mirtazapine  7.5 mg Oral Nightly    pantoprazole  40 mg Oral BID    pravastatin  20 mg Oral QHS    sodium chloride 0.9%  3 mL Intravenous Q8H    warfarin  1 mg Oral Daily     PRN Meds:sodium chloride, sodium chloride, HYDROcodone-acetaminophen, polyethylene glycol    Review of patient's allergies indicates:   Allergen Reactions    Asa [aspirin] Other (See Comments)     Bleeding ulcer     Objective:     Vital Signs (Most Recent):  Temp: 97.3 °F (36.3 °C) (10/28/18 1202)  Pulse: 79 (10/28/18 1202)  Resp: 17 (10/28/18 1202)  BP: (!) 68/0 (10/28/18 0800)  SpO2: 97 % (10/28/18 1202) Vital Signs (24h Range):  Temp:  [97.3 °F (36.3 °C)-99.2 °F (37.3 °C)] 97.3 °F (36.3 °C)  Pulse:  [66-80] 79  Resp:  [16-18] 17  SpO2:  [96 %-99 %] 97 %  BP: (58-87)/(0-57) 68/0     Patient Vitals for the past 72 hrs (Last 3 readings):   Weight   10/28/18 0700 69.9 kg (154 lb 1.6 oz)   10/27/18 0800 69 kg (152 lb 1.9 oz)   10/26/18 0500 75 kg (165 lb 5.5 oz)     Body mass index is 22.76 kg/m².      Intake/Output Summary (Last 24 hours) at 10/28/2018 1214  Last data filed at 10/28/2018 0800  Gross per 24 hour   Intake 1136 ml   Output 850 ml   Net 286 ml       Hemodynamic Parameters:       Telemetry: no event noted    Physical Exam     Constitutional: He is oriented to person, place, and time. He appears well-developed.   Eyes: No scleral icterus.   Neck: No JVD present.   Cardiovascular:   Smooth LVAD hum- intermittent pulse   Pulmonary/Chest: Effort normal and breath sounds normal.   Abdominal: Soft. Bowel sounds are normal. He  exhibits no mass. There is no tenderness. There is no rebound.   Musculoskeletal: He exhibits no edema or tenderness.   Neurological: He is alert and oriented to person, place, and time.   Skin: Skin is warm and dry.   Psychiatric:   Mild flat affect   Nursing note and vitals reviewed.    Significant Labs:  CBC:  Recent Labs   Lab 10/27/18  0939 10/27/18  1955 10/28/18  1054   WBC 6.93 6.36 7.99   RBC 2.69* 2.62* 2.68*   HGB 7.5* 7.0* 7.3*   HCT 23.6* 23.1* 23.6*   PLT 94* 108* 103*   MCV 88 88 88   MCH 27.9 26.7* 27.2   MCHC 31.8* 30.3* 30.9*     BNP:  Recent Labs   Lab 10/24/18  1519 10/26/18  0400   * 123*     CMP:  Recent Labs   Lab 10/24/18  1519 10/25/18  0040  10/26/18  0400 10/27/18  0456 10/28/18  0658   * 109   < > 87 73 83   CALCIUM 8.6* 8.5*   < > 8.1* 8.3* 8.2*   ALBUMIN 3.1* 2.9*  --  2.8*  --   --    PROT 5.7* 5.4*  --  5.5*  --   --     133*   < > 136 140 137   K 4.2 4.6   < > 4.6 3.9 4.2   CO2 13* 17*   < > 18* 22* 25    108   < > 111* 112* 109   BUN 47* 61*   < > 46* 26* 17   CREATININE 1.5* 1.9*   < > 1.3 0.9 0.9   ALKPHOS 39* 43*  --  42*  --   --    ALT 14 12  --  17  --   --    AST 26 15  --  38  --   --    BILITOT 0.4 0.7  --  0.7  --   --     < > = values in this interval not displayed.      Coagulation:   Recent Labs   Lab 10/26/18  0400 10/27/18  0456 10/28/18  0658   INR 3.5* 2.8* 2.0*   APTT 28.1 27.8 27.4     LDH:  Recent Labs   Lab 10/26/18  0400 10/27/18  0456 10/28/18  0658   * 156 156     Microbiology:  Microbiology Results (last 7 days)     ** No results found for the last 168 hours. **          I have reviewed all pertinent labs within the past 24 hours.      Estimated Creatinine Clearance: 77.7 mL/min (based on SCr of 0.9 mg/dL).    Diagnostic Results:

## 2018-10-28 NOTE — ASSESSMENT & PLAN NOTE
-Symptomatic anemia, Acute blood loss anemia, unclear etiology, presumed GI bleed (prior hx, however no recent melena), doubt hemolysis (LD mildly elevated, Bili wnl):  - Hbg 4.2 on admit. S/p tranfusion  - INR 4.4 on admit.   - INR 2.0 today. Continue coumadin 1mg daily  - H/h trending down (7 today form 7.5 yesterday) but not critical for transfusion. Will check closely and transfuse if needed

## 2018-10-28 NOTE — PROGRESS NOTES
[facility]  Heart Transplant  Progress Note    Patient Name: Suman Hayden  MRN: [id number]  Admission Date: 10/24/2018  Hospital Length of Stay: 4 days  Attending Physician: Ortega Leos MD  Primary Care Provider: Joe Ernst MD  Principal Problem:GI bleed    Subjective:     Interval History:     Denies acute overnight event but had melena this morning. He denied dizziness, SOB, chest pain,  abd pain or LVAD alarm. Coumadin 1mg restarted yesterday and INR 2.0 today.  GI evaluated patient this morning and will advise if they will pursue DBE    Continuous Infusions:  Scheduled Meds:   amiodarone  400 mg Oral Daily    dronabinol  5 mg Oral TID    lisinopril  2.5 mg Oral Daily    mirtazapine  7.5 mg Oral Nightly    pantoprazole  40 mg Oral BID    pravastatin  20 mg Oral QHS    sodium chloride 0.9%  3 mL Intravenous Q8H    warfarin  1 mg Oral Daily     PRN Meds:sodium chloride, sodium chloride, HYDROcodone-acetaminophen, polyethylene glycol    Review of patient's allergies indicates:   Allergen Reactions    Asa [aspirin] Other (See Comments)     Bleeding ulcer     Objective:     Vital Signs (Most Recent):  Temp: 97.3 °F (36.3 °C) (10/28/18 1202)  Pulse: 79 (10/28/18 1202)  Resp: 17 (10/28/18 1202)  BP: (!) 68/0 (10/28/18 0800)  SpO2: 97 % (10/28/18 1202) Vital Signs (24h Range):  Temp:  [97.3 °F (36.3 °C)-99.2 °F (37.3 °C)] 97.3 °F (36.3 °C)  Pulse:  [66-80] 79  Resp:  [16-18] 17  SpO2:  [96 %-99 %] 97 %  BP: (58-87)/(0-57) 68/0     Patient Vitals for the past 72 hrs (Last 3 readings):   Weight   10/28/18 0700 69.9 kg (154 lb 1.6 oz)   10/27/18 0800 69 kg (152 lb 1.9 oz)   10/26/18 0500 75 kg (165 lb 5.5 oz)     Body mass index is 22.76 kg/m².      Intake/Output Summary (Last 24 hours) at 10/28/2018 1214  Last data filed at 10/28/2018 0800  Gross per 24 hour   Intake 1136 ml   Output 850 ml   Net 286 ml       Hemodynamic Parameters:       Telemetry: no event noted    Physical Exam      Constitutional: He is oriented to person, place, and time. He appears well-developed.   Eyes: No scleral icterus.   Neck: No JVD present.   Cardiovascular:   Smooth LVAD hum- intermittent pulse   Pulmonary/Chest: Effort normal and breath sounds normal.   Abdominal: Soft. Bowel sounds are normal. He exhibits no mass. There is no tenderness. There is no rebound.   Musculoskeletal: He exhibits no edema or tenderness.   Neurological: He is alert and oriented to person, place, and time.   Skin: Skin is warm and dry.   Psychiatric:   Mild flat affect   Nursing note and vitals reviewed.    Significant Labs:  CBC:  Recent Labs   Lab 10/27/18  0939 10/27/18  1955 10/28/18  1054   WBC 6.93 6.36 7.99   RBC 2.69* 2.62* 2.68*   HGB 7.5* 7.0* 7.3*   HCT 23.6* 23.1* 23.6*   PLT 94* 108* 103*   MCV 88 88 88   MCH 27.9 26.7* 27.2   MCHC 31.8* 30.3* 30.9*     BNP:  Recent Labs   Lab 10/24/18  1519 10/26/18  0400   * 123*     CMP:  Recent Labs   Lab 10/24/18  1519 10/25/18  0040  10/26/18  0400 10/27/18  0456 10/28/18  0658   * 109   < > 87 73 83   CALCIUM 8.6* 8.5*   < > 8.1* 8.3* 8.2*   ALBUMIN 3.1* 2.9*  --  2.8*  --   --    PROT 5.7* 5.4*  --  5.5*  --   --     133*   < > 136 140 137   K 4.2 4.6   < > 4.6 3.9 4.2   CO2 13* 17*   < > 18* 22* 25    108   < > 111* 112* 109   BUN 47* 61*   < > 46* 26* 17   CREATININE 1.5* 1.9*   < > 1.3 0.9 0.9   ALKPHOS 39* 43*  --  42*  --   --    ALT 14 12  --  17  --   --    AST 26 15  --  38  --   --    BILITOT 0.4 0.7  --  0.7  --   --     < > = values in this interval not displayed.      Coagulation:   Recent Labs   Lab 10/26/18  0400 10/27/18  0456 10/28/18  0658   INR 3.5* 2.8* 2.0*   APTT 28.1 27.8 27.4     LDH:  Recent Labs   Lab 10/26/18  0400 10/27/18  0456 10/28/18  0658   * 156 156     Microbiology:  Microbiology Results (last 7 days)     ** No results found for the last 168 hours. **          I have reviewed all pertinent labs within the past 24  hours.      Estimated Creatinine Clearance: 77.7 mL/min (based on SCr of 0.9 mg/dL).    Diagnostic Results:        Assessment and Plan:     Mr. Hayden is a 68 yoAAM, admitted to \[facility]\"" for symptomatic anemia in the setting of acute blood los anemia due to GI bleed. PMHx of NICM, S/P Heartmate 3 as DT therapy 7/27/17, ICD revision 11/20/17 with Dr. Vidal (DC ICD placed with active RA/LV leads (RV lead capped during revision) that was complicated by pocket hematoma, GI bleed, VT on Amiodarone. Patient is a transfer from [facility] for acute blood loss anemia. HPI gathered  from spouse at bedside, state patient developed generalized weakness, worsening fatigability and pale appearance past 1 week. He presented to ER with c/o R sided chest pain, which patient describes as sharp shooting in nature, intermittent, 6/10, started. Further work up in ED revealed HBG ~ 4, baseline Hbg 9. Denies any GI bleed, no dark tarry stool, no melena, hematochezia, no hematuria, hemoptysis. Patient is on oral Iron but reports brown stools. Currently denies any chest pain, SOB, N/V/D. No VAD alarms.     Enroute given 1u pRBC. Repeat Hbg 5.3, INR 2.3 -> 4.4.             * GI bleed    - Appreciate GI's help. GI Push enteroscopy negative for source  - INR 2.8 - Coumadin 1mg oral today  - Had melena this morning and seen by GI. Possibly will undergo DBE with GI on Tuesday. FU their recomendations  - Off ASA 2/2 h/o GI bleeds       Anemia    -Symptomatic anemia, Acute blood loss anemia, unclear etiology, presumed GI bleed (prior hx, however no recent melena), doubt hemolysis (LD mildly elevated, Bili wnl):  - Hbg 4.2 on admit. S/p tranfusion  - INR 4.4 on admit.   - INR 2.0 today. Continue coumadin 1mg daily  - H/h trending down (7 today form 7.5 yesterday) but not critical for transfusion. Will check closely and transfuse if needed     LVAD (left ventricular assist device) present    [facility] s/p 3 2017:   - No LFA recently on interrogation    - ASA stopped on 12/22/17 after GI bleed and ileal ulcer    - INR 2.0 - Coumadin 1mg x1 today  - 's - 200s   - TTE 10/25 at 5200 rpm before getting 4 units PC's: LVEDD 3.7, AV does not open, septum midline. Plan to repeat as an outpatient       Procedure: Device Interrogation Including analysis of device parameters  Current Settings: Ventricular Assist Device  Review of device function shows few PI events    TXP LVAD INTERROGATIONS 10/28/2018 10/28/2018 10/27/2018 10/27/2018 10/27/2018 10/27/2018 10/27/2018   Type HeartMate3 HeartMate3 HeartMate3 HeartMate3 HeartMate3 HeartMate3 (No Data)   Flow 4.3 4.4 4.4 4.1 4.1 4.2 -   Speed 5200 5200 5200 5200 5200 5200 -   PI 2.4 2.3 1.9 3.8 3.8 3.5 -   Power ([place]) 3.6 3.5 3.5 3.5 3.4 3.5 -   LSL 4800 4800 4800 4800 - - -   Pulsatility Intermittent pulse - - - Intermittent pulse Intermittent pulse -        INDIRA (acute kidney injury)    -INDIRA (Cr 1.9 on admit, down to 1.3 today, baseline 1.1):   -Likely in the setting of hypoperfusion   - back to baseline and lisinopril restarted at low dose       VT (ventricular tachycardia)    Continue PTA Amiodarone  Caution previously developed VT with anemia  Cannot torelate Mexitil     Essential hypertension    Resume lisinopril 2.5mg BID   Has labile BP       Nonischemic cardiomyopathy    S/p LVAD    - LVEDD 6.2 / LVef 25%  - see VAD plan          Baljinder Negrete MD  Heart Transplant  [facility]

## 2018-10-28 NOTE — ASSESSMENT & PLAN NOTE
- Appreciate GI's help. GI Push enteroscopy negative for source  - INR 2.8 - Coumadin 1mg oral today  - Had melena this morning and seen by GI. Possibly will undergo DBE with GI on Tuesday. FU their recomendations  - Off ASA 2/2 h/o GI bleeds

## 2018-10-28 NOTE — PROGRESS NOTES
[facility]  Gastroenterology  Progress Note    Patient Name: Suman Hayden  MRN: [id number]  Admission Date: 10/24/2018  Hospital Length of Stay: 4 days  Code Status: Full Code   Attending Provider: Ortega Leos MD  Consulting Provider: Karri Carrera MD  Primary Care Physician: Joe Ernst MD  Principal Problem: GI bleed      Subjective:     Interval History:   - called by team to re-assess as had passed one episode of melena in AM. Per patient this was his first BM since the procedure on Friday (10/26). He denies any abdominal pain, nausea, vomiting or diarrhea. Denies any chest pain, dyspnea, cough or other complaints on review.  - per primary team, no concerns regarding hemodynamics with LVAD in AM  - H&H trend reviewed and remains stable H&H (7-8) since procedure Friday without further transfusion requirement.    Review of Systems   Constitutional: Negative for activity change, appetite change, diaphoresis, fatigue and fever.   HENT: Negative for sore throat and trouble swallowing.    Eyes: Negative for visual disturbance.   Respiratory: Negative for chest tightness and shortness of breath.    Cardiovascular: Negative for chest pain.   Gastrointestinal: Positive for blood in stool. Negative for abdominal pain, constipation, diarrhea, nausea and vomiting.   Genitourinary: Negative for dysuria.   Musculoskeletal: Negative for arthralgias and myalgias.   Skin: Negative for rash.   Neurological: Negative for dizziness and light-headedness.   Psychiatric/Behavioral: Negative for confusion.     Objective:     Vital Signs (Most Recent):  Temp: 98.7 °F (37.1 °C) (10/28/18 0755)  Pulse: 79 (10/28/18 1100)  Resp: 17 (10/28/18 0755)  BP: (!) 68/0 (10/28/18 0800)  SpO2: 97 % (10/28/18 0755) Vital Signs (24h Range):  Temp:  [97.8 °F (36.6 °C)-99.2 °F (37.3 °C)] 98.7 °F (37.1 °C)  Pulse:  [66-80] 79  Resp:  [16-18] 17  SpO2:  [96 %-99 %] 97 %  BP: (58-87)/(0-57) 68/0     Weight: 69.9 kg (154 lb  1.6 oz) (10/28/18 0700)  Body mass index is 22.76 kg/m².      Intake/Output Summary (Last 24 hours) at 10/28/2018 1148  Last data filed at 10/28/2018 0800  Gross per 24 hour   Intake 1136 ml   Output 1050 ml   Net 86 ml       Lines/Drains/Airways     Line                 VAD 07/27/17 1312 Left ventricular assist device HeartMate 3 457 days          Peripheral Intravenous Line                 Midline Catheter Insertion/Assessment  - Single Lumen 10/25/18 1550 Right brachial vein 18g x 8cm 2 days                Physical Exam   Constitutional: He is oriented to person, place, and time. He appears well-developed and well-nourished. No distress.   Sitting in recliner watching tv, wife on couch. Pleasant, friendly talkative and in no distress.   HENT:   Head: Normocephalic and atraumatic.   Eyes: Conjunctivae are normal. No scleral icterus.   Pulmonary/Chest: Effort normal. No respiratory distress.   Abdominal: Soft. Bowel sounds are normal. He exhibits no distension and no mass. There is no tenderness. There is no rebound.   Two formed piece of melenic stool in toilet bowl.   Lymphadenopathy:     He has no cervical adenopathy.   Neurological: He is alert and oriented to person, place, and time.   Skin: Skin is warm. He is not diaphoretic.   Psychiatric: He has a normal mood and affect. His behavior is normal. Judgment and thought content normal.   Vitals reviewed.      Significant Labs:  CBC:   Recent Labs   Lab 10/27/18  0939 10/27/18  1955 10/28/18  1054   WBC 6.93 6.36 7.99   HGB 7.5* 7.0* 7.3*   HCT 23.6* 23.1* 23.6*   PLT 94* 108* 103*     CMP:   Recent Labs   Lab 10/28/18  0658   GLU 83   CALCIUM 8.2*      K 4.2   CO2 25      BUN 17   CREATININE 0.9     Coagulation:   Recent Labs   Lab 10/28/18  0658   INR 2.0*   APTT 27.4         Significant Imaging:  Imaging results within the past 24 hours have been reviewed.    Assessment/Plan:     * GI bleed    68 year old man who presented w/ symptomatic anemia in  the setting of acute blood loss likely due to GI bleed.  MHx includes AICD, LVAD, CHF, CAD, gastric polyp, GI bleed, HTN, HLD, and small bowel resection.     Presented with concern for UGIB in setting of ?one episode of hematemsis and melena. On admission required 4u pRBC with appropriate response in hgb. S/p push enteroscopy (to jejunum) without source of bleeding noted. Since then, his H&H remains stable without further evidence of bleeding with exception of 1 episode of melena on 10/28.    Plan  - will discuss case in AM with Dr. Chatterjee as patient may benefit from diagnostic DBE. Given his history of ileal resection would likely need a patency capsule if wanted to do a VCE prior. Had a colonoscopy 2 months ago and is unlikely to be beneficial to repeat at this time.  - can resume diet  - continue to trend H&H  - Anticoagulation management per primary team, current INR 2.0  - plan discussed with primary team and family         Thank you for your consult. I will follow-up with patient. Please contact us if you have any additional questions.    Karri Carrera MD  Gastroenterology  [facility]

## 2018-10-28 NOTE — NURSING
Notified MD Ibanez pt's doppler 58/0, unable to obtain cuff pressure. Pt denies feeling light headed or dizzy. Per MD administer 250 ml bolus of NS and recheck BP in 1 hr. Will complete order and continue to monitor.

## 2018-10-28 NOTE — PLAN OF CARE
Problem: Patient Care Overview  Goal: Plan of Care Review  Outcome: Ongoing (interventions implemented as appropriate)  Pt free of falls and injuries on day of shift. Pt H/h stable last value was: 7.0/23.1, ordered Q12. Monitored pt for bleeding, pt hasn't had BM in 3 days, gave miralax PRN. PO protonix given. INR 2.8. Heartmate 3 numbers WNL Pt received 250 ml bolus of NS due to doppler of 58; rechecked within a hour, next doppler WNL. Pt denies SOB and chest pain. VSS, will continue to monitor.

## 2018-10-28 NOTE — SUBJECTIVE & OBJECTIVE
Subjective:     Interval History:   - called by team to re-assess as had passed one episode of melena in AM. Per patient this was his first BM since the procedure on Friday (10/26). He denies any abdominal pain, nausea, vomiting or diarrhea. Denies any chest pain, dyspnea, cough or other complaints on review.  - per primary team, no concerns regarding hemodynamics with LVAD in AM  - H&H trend reviewed and remains stable H&H (7-8) since procedure Friday without further transfusion requirement.    Review of Systems   Constitutional: Negative for activity change, appetite change, diaphoresis, fatigue and fever.   HENT: Negative for sore throat and trouble swallowing.    Eyes: Negative for visual disturbance.   Respiratory: Negative for chest tightness and shortness of breath.    Cardiovascular: Negative for chest pain.   Gastrointestinal: Positive for blood in stool. Negative for abdominal pain, constipation, diarrhea, nausea and vomiting.   Genitourinary: Negative for dysuria.   Musculoskeletal: Negative for arthralgias and myalgias.   Skin: Negative for rash.   Neurological: Negative for dizziness and light-headedness.   Psychiatric/Behavioral: Negative for confusion.     Objective:     Vital Signs (Most Recent):  Temp: 98.7 °F (37.1 °C) (10/28/18 0755)  Pulse: 79 (10/28/18 1100)  Resp: 17 (10/28/18 0755)  BP: (!) 68/0 (10/28/18 0800)  SpO2: 97 % (10/28/18 0755) Vital Signs (24h Range):  Temp:  [97.8 °F (36.6 °C)-99.2 °F (37.3 °C)] 98.7 °F (37.1 °C)  Pulse:  [66-80] 79  Resp:  [16-18] 17  SpO2:  [96 %-99 %] 97 %  BP: (58-87)/(0-57) 68/0     Weight: 69.9 kg (154 lb 1.6 oz) (10/28/18 0700)  Body mass index is 22.76 kg/m².      Intake/Output Summary (Last 24 hours) at 10/28/2018 1148  Last data filed at 10/28/2018 0800  Gross per 24 hour   Intake 1136 ml   Output 1050 ml   Net 86 ml       Lines/Drains/Airways     Line                 VAD 07/27/17 1312 Left ventricular assist device HeartMate 3 457 days           Peripheral Intravenous Line                 Midline Catheter Insertion/Assessment  - Single Lumen 10/25/18 1550 Right brachial vein 18g x 8cm 2 days                Physical Exam   Constitutional: He is oriented to person, place, and time. He appears well-developed and well-nourished. No distress.   Sitting in recliner watching tv, wife on couch. Pleasant, friendly talkative and in no distress.   HENT:   Head: Normocephalic and atraumatic.   Eyes: Conjunctivae are normal. No scleral icterus.   Pulmonary/Chest: Effort normal. No respiratory distress.   Abdominal: Soft. Bowel sounds are normal. He exhibits no distension and no mass. There is no tenderness. There is no rebound.   Two formed piece of melenic stool in toilet bowl.   Lymphadenopathy:     He has no cervical adenopathy.   Neurological: He is alert and oriented to person, place, and time.   Skin: Skin is warm. He is not diaphoretic.   Psychiatric: He has a normal mood and affect. His behavior is normal. Judgment and thought content normal.   Vitals reviewed.      Significant Labs:  CBC:   Recent Labs   Lab 10/27/18  0939 10/27/18  1955 10/28/18  1054   WBC 6.93 6.36 7.99   HGB 7.5* 7.0* 7.3*   HCT 23.6* 23.1* 23.6*   PLT 94* 108* 103*     CMP:   Recent Labs   Lab 10/28/18  0658   GLU 83   CALCIUM 8.2*      K 4.2   CO2 25      BUN 17   CREATININE 0.9     Coagulation:   Recent Labs   Lab 10/28/18  0658   INR 2.0*   APTT 27.4         Significant Imaging:  Imaging results within the past 24 hours have been reviewed.

## 2018-10-28 NOTE — ASSESSMENT & PLAN NOTE
68 year old man who presented w/ symptomatic anemia in the setting of acute blood loss likely due to GI bleed.  MHx includes AICD, LVAD, CHF, CAD, gastric polyp, GI bleed, HTN, HLD, and small bowel resection.     Presented with concern for UGIB in setting of ?one episode of hematemsis and melena. On admission required 4u pRBC with appropriate response in hgb. S/p push enteroscopy (to jejunum) without source of bleeding noted. Since then, his H&H remains stable without further evidence of bleeding with exception of 1 episode of melena on 10/28.    Plan  - will discuss case in AM with Dr. Chatterjee as patient may benefit from diagnostic DBE. Given his history of ileal resection would likely need a patency capsule if wanted to do a VCE prior. Had a colonoscopy 2 months ago and is unlikely to be beneficial to repeat at this time.  - can resume diet  - continue to trend H&H  - Anticoagulation management per primary team, current INR 2.0  - plan discussed with primary team and family

## 2018-10-28 NOTE — ASSESSMENT & PLAN NOTE
-INDIRA (Cr 1.9 on admit, down to 1.3 today, baseline 1.1):   -Likely in the setting of hypoperfusion   - back to baseline and lisinopril restarted at low dose

## 2018-10-29 NOTE — ASSESSMENT & PLAN NOTE
ROMANA s/p HM3 7/27/2017:   - No LFA recently on interrogation   - ASA stopped on 12/22/17 after GI bleed and ileal ulcer    - INR 1.5 - Coumadin 1mg x1 today. No bridge due to bleeds.  - 's - 200s   - TTE 10/25 at 5200 rpm before getting 4 units PC's: LVEDD 3.7, AV does not open, septum midline.LVEF 10-15%, E/e' 22, RVSF- normal.  Plan to repeat as an outpatient.  -Plan to present him this week for Heart transplant.       Procedure: Device Interrogation Including analysis of device parameters  Current Settings: Ventricular Assist Device  Review of device function shows few PI events    TXP LVAD INTERROGATIONS 10/29/2018 10/29/2018 10/29/2018 10/29/2018 10/28/2018 10/28/2018 10/28/2018   Type HeartMate3 HeartMate3 HeartMate3 HeartMate3 (No Data) HeartMate3 HeartMate3   Flow 4.2 4.2 3.8 4.5 - 4.2 4.2   Speed 5200 5200 5200 5200 - 5200 5200   PI 3.7 3.9 4.8 2.0 - 4.0 3.9   Power ([place]) 3.5 3.5 3.5 3.5 - 3.5 3.5   LSL - 4800 - - - 4800 -   Pulsatility - - - - - No Pulse No Pulse

## 2018-10-29 NOTE — ASSESSMENT & PLAN NOTE
-INDIRA (Cr 1.9 on admit, down to 0.8 today, baseline 1.1):   -Likely in the setting of hypoperfusion   - back to baseline and lisinopril restarted at low dose

## 2018-10-29 NOTE — NURSING
Notified MD Tee of pt's recent H/h 6.8/22.9 and BP 69/51 (57) doppler 64/0. MD stated he will order one unit of PRBC's to transfuse and type and screen.

## 2018-10-29 NOTE — ASSESSMENT & PLAN NOTE
Continue PTA Amiodarone  Caution previously developed VT with anemia  Cannot torelate Mexitil  - Had ICD interrogated with no acute events 10/2018.  -EP saw during last admission  Discharged on 10/17 with Hb -9.2) due to hypotension, dizziness, low flows- held mexiletine which was started 9/2018, though unlikely cause of symptoms. Rec compression stockings. Ablation at a later date. Had drop in Hb from 12.5 on 10/4 to 9.2  on 10/17.

## 2018-10-29 NOTE — ASSESSMENT & PLAN NOTE
-Symptomatic anemia, Acute blood loss anemia, unclear etiology, presumed GI bleed (prior hx, however no recent melena), doubt hemolysis (LD mildly elevated, Bili wnl):  - Hbg 4.2 on admit. S/p tranfusion  - INR 4.4 on admit.   - INR1.5 today. Continue coumadin 1mg daily  - H/h 6.8 -> 7.5, 1 unit PRBC on 10/28  -Monitor H/H closely.

## 2018-10-29 NOTE — PROGRESS NOTES
[facility]  Heart Transplant  Progress Note    Patient Name: Suman Hayden  MRN: [id number]  Admission Date: 10/24/2018  Hospital Length of Stay: 5 days  Attending Physician: Ortega Leos MD  Primary Care Provider: Joe Ernst MD  Principal Problem:GI bleed    Subjective:     Interval History: Patient seen and examined today at bedside. He had one large melanotic stool yesterday. Did not feel LH, syncope. Received one unit PRBC. Has been out of bed. No low flow alarms at bedside.    Continuous Infusions:  Scheduled Meds:   amiodarone  400 mg Oral Daily    dronabinol  5 mg Oral TID    lisinopril  2.5 mg Oral Daily    mirtazapine  7.5 mg Oral Nightly    pantoprazole  40 mg Oral BID    pravastatin  20 mg Oral QHS    sodium chloride 0.9%  3 mL Intravenous Q8H    warfarin  1 mg Oral Daily     PRN Meds:sodium chloride, sodium chloride, sodium chloride, HYDROcodone-acetaminophen, polyethylene glycol    Review of patient's allergies indicates:   Allergen Reactions    Asa [aspirin] Other (See Comments)     Bleeding ulcer     Objective:     Vital Signs (Most Recent):  Temp: 99.5 °F (37.5 °C) (10/29/18 0927)  Pulse: 80 (10/29/18 1455)  Resp: 16 (10/29/18 0927)  BP: (!) 62/0 (10/29/18 1200)  SpO2: 100 % (10/29/18 0927) Vital Signs (24h Range):  Temp:  [98.5 °F (36.9 °C)-99.5 °F (37.5 °C)] 99.5 °F (37.5 °C)  Pulse:  [53-81] 80  Resp:  [16-18] 16  SpO2:  [97 %-100 %] 100 %  BP: (62-69)/(0-51) 62/0     Patient Vitals for the past 72 hrs (Last 3 readings):   Weight   10/29/18 0713 71.5 kg (157 lb 10.1 oz)   10/28/18 0700 69.9 kg (154 lb 1.6 oz)   10/27/18 0800 69 kg (152 lb 1.9 oz)     Body mass index is 23.28 kg/m².      Intake/Output Summary (Last 24 hours) at 10/29/2018 1547  Last data filed at 10/29/2018 1400  Gross per 24 hour   Intake 1455.67 ml   Output 400 ml   Net 1055.67 ml       Hemodynamic Parameters:       Telemetry: Artifact from VAD    Physical Exam   Constitutional: He appears  well-developed and well-nourished.   HENT:   Mouth/Throat: Oropharynx is clear and moist.   Neck: No JVD present.   Cardiovascular: Normal rate and regular rhythm.   Smooth VAD sounds heard.   Pulmonary/Chest: Effort normal. No respiratory distress. He has no wheezes. He has no rales.   Abdominal: Soft. Bowel sounds are normal. He exhibits no distension. There is no tenderness. There is no guarding.   Musculoskeletal: He exhibits no edema.   Neurological: He is alert.   Skin: Skin is warm.   Nursing note and vitals reviewed.      Significant Labs:  CBC:  Recent Labs   Lab 10/28/18  1920 10/29/18  0635 10/29/18  0902   WBC 8.19 6.39 6.35   RBC 2.56* 2.83* 2.75*   HGB 6.8* 7.8* 7.5*   HCT 22.9* 25.4* 24.4*   * 107* 101*   MCV 90 90 89   MCH 26.6* 27.6 27.3   MCHC 29.7* 30.7* 30.7*     BNP:  Recent Labs   Lab 10/24/18  1519 10/26/18  0400 10/29/18  0501   * 123* 195*     CMP:  Recent Labs   Lab 10/25/18  0040  10/26/18  0400 10/27/18  0456 10/28/18  0658 10/29/18  0501      < > 87 73 83 80   CALCIUM 8.5*   < > 8.1* 8.3* 8.2* 7.8*   ALBUMIN 2.9*  --  2.8*  --   --  2.4*   PROT 5.4*  --  5.5*  --   --  4.6*   *   < > 136 140 137 136   K 4.6   < > 4.6 3.9 4.2 4.1   CO2 17*   < > 18* 22* 25 22*      < > 111* 112* 109 112*   BUN 61*   < > 46* 26* 17 13   CREATININE 1.9*   < > 1.3 0.9 0.9 0.8   ALKPHOS 43*  --  42*  --   --  55   ALT 12  --  17  --   --  13   AST 15  --  38  --   --  16   BILITOT 0.7  --  0.7  --   --  0.6    < > = values in this interval not displayed.      Coagulation:   Recent Labs   Lab 10/27/18  0456 10/28/18  0658 10/29/18  0501   INR 2.8* 2.0* 1.5*   APTT 27.8 27.4 28.9     LDH:  Recent Labs   Lab 10/27/18  0456 10/28/18  0658 10/29/18  0501    156 136     Microbiology:  Microbiology Results (last 7 days)     ** No results found for the last 168 hours. **          BMP:   Recent Labs   Lab 10/29/18  0501   GLU 80      K 4.1   *   CO2 22*   BUN 13    CREATININE 0.8   CALCIUM 7.8*   MG 2.2     Cardiac Markers: No results for input(s): CKMB, TROPONINT, MYOGLOBIN in the last 72 hours.  Coagulation:   Recent Labs   Lab 10/29/18  0501   INR 1.5*   APTT 28.9     I have reviewed all pertinent labs within the past 24 hours.    Estimated Creatinine Clearance: 88.4 mL/min (based on SCr of 0.8 mg/dL).    Diagnostic Results:  I have reviewed all pertinent imaging results/findings within the past 24 hours.    Assessment and Plan:     Mr. Hayden is a 68 yoAAM, admitted to [facility] for symptomatic anemia in the setting of acute blood los anemia due to GI bleed. PMHx of NICM, S/P Heartmate 3 as DT therapy 7/27/17, ICD revision 11/20/17 with Dr. Vidal (DC ICD placed with active RA/LV leads (RV lead capped during revision) that was complicated by pocket hematoma, GI bleed, VT on Amiodarone. Patient is a transfer from [facility] for acute blood loss anemia. HPI gathered  from spouse at bedside, state patient developed generalized weakness, worsening fatigability and pale appearance past 1 week. He presented to ER with c/o R sided chest pain, which patient describes as sharp shooting in nature, intermittent, 6/10, started. Further work up in ED revealed HBG ~ 4, baseline Hbg 9. Denies any GI bleed, no dark tarry stool, no melena, hematochezia, no hematuria, hemoptysis. Patient is on oral Iron but reports brown stools. Currently denies any chest pain, SOB, N/V/D. No VAD alarms.     Enroute given 1u pRBC. Repeat Hbg 5.3, INR 2.3 -> 4.4.             * GI bleed    - Appreciate GI's help. GI Push enteroscopy negative for source  - INR 1.5, will continue with coumadin  - Had melena yesterday on 10/28 and seen by GI. Possibly will undergo DBE +/_ VCE with GI on Tuesday. FU their recomendations  - Off ASA 2/2 h/o GI bleeds since 2017, had h/o ileal resection.  Had EGD/Colonoscopy 8/2018 - mucosal nodule in the duodenum and 2 mm polyp in prox transverse colon, diverticulosis.  - No  bridge due to GI bleed.       INDIRA (acute kidney injury)    -INDIRA (Cr 1.9 on admit, down to 0.8 today, baseline 1.1):   -Likely in the setting of hypoperfusion   - back to baseline and lisinopril restarted at low dose       Anemia    -Symptomatic anemia, Acute blood loss anemia, unclear etiology, presumed GI bleed (prior hx, however no recent melena), doubt hemolysis (LD mildly elevated, Bili wnl):  - Hbg 4.2 on admit. S/p tranfusion  - INR 4.4 on admit.   - INR1.5 today. Continue coumadin 1mg daily  - H/h 6.8 -> 7.5, 1 unit PRBC on 10/28  -Monitor H/H closely.     LVAD (left ventricular assist device) present    [facility] s/p HM3 7/27/2017:   - No LFA recently on interrogation   - ASA stopped on 12/22/17 after GI bleed and ileal ulcer    - INR 1.5 - Coumadin 1mg x1 today. No bridge due to bleeds.  - 's - 200s   - TTE 10/25 at 5200 rpm before getting 4 units PC's: LVEDD 3.7, AV does not open, septum midline.LVEF 10-15%, E/e' 22, RVSF- normal.  Plan to repeat as an outpatient.  -Plan to present him this week for Heart transplant.       Procedure: Device Interrogation Including analysis of device parameters  Current Settings: Ventricular Assist Device  Review of device function shows few PI events    TXP LVAD INTERROGATIONS 10/29/2018 10/29/2018 10/29/2018 10/29/2018 10/28/2018 10/28/2018 10/28/2018   Type HeartMate3 HeartMate3 HeartMate3 HeartMate3 (No Data) HeartMate3 HeartMate3   Flow 4.2 4.2 3.8 4.5 - 4.2 4.2   Speed 5200 5200 5200 5200 - 5200 5200   PI 3.7 3.9 4.8 2.0 - 4.0 3.9   Power ([place]) 3.5 3.5 3.5 3.5 - 3.5 3.5   LSL - 4800 - - - 4800 -   Pulsatility - - - - - No Pulse No Pulse        VT (ventricular tachycardia)    Continue PTA Amiodarone  Caution previously developed VT with anemia  Cannot torelate Mexitil  - Had ICD interrogated with no acute events 10/2018.  -EP saw during last admission  Discharged on 10/17 with Hb -9.2) due to hypotension, dizziness, low flows- held mexiletine which was started  9/2018, though unlikely cause of symptoms. Rec compression stockings. Ablation at a later date. Had drop in Hb from 12.5 on 10/4 to 9.2  on 10/17.      Essential hypertension    Resume lisinopril 2.5mg BID   Has labile BP       Nonischemic cardiomyopathy    S/p LVAD    - LVEDD 6.2 / LVef 25%  - see VAD plan        Case d/w Dr.Eiswirth Alisha Roland MD  Heart Transplant  [facility]

## 2018-10-29 NOTE — SUBJECTIVE & OBJECTIVE
Interval History: Patient seen and examined today at bedside. He had one large melanotic stool yesterday. Did not feel LH, syncope. Received one unit PRBC. Has been out of bed. No low flow alarms at bedside.    Continuous Infusions:  Scheduled Meds:   amiodarone  400 mg Oral Daily    dronabinol  5 mg Oral TID    lisinopril  2.5 mg Oral Daily    mirtazapine  7.5 mg Oral Nightly    pantoprazole  40 mg Oral BID    pravastatin  20 mg Oral QHS    sodium chloride 0.9%  3 mL Intravenous Q8H    warfarin  1 mg Oral Daily     PRN Meds:sodium chloride, sodium chloride, sodium chloride, HYDROcodone-acetaminophen, polyethylene glycol    Review of patient's allergies indicates:   Allergen Reactions    Asa [aspirin] Other (See Comments)     Bleeding ulcer     Objective:     Vital Signs (Most Recent):  Temp: 99.5 °F (37.5 °C) (10/29/18 0927)  Pulse: 80 (10/29/18 1455)  Resp: 16 (10/29/18 0927)  BP: (!) 62/0 (10/29/18 1200)  SpO2: 100 % (10/29/18 0927) Vital Signs (24h Range):  Temp:  [98.5 °F (36.9 °C)-99.5 °F (37.5 °C)] 99.5 °F (37.5 °C)  Pulse:  [53-81] 80  Resp:  [16-18] 16  SpO2:  [97 %-100 %] 100 %  BP: (62-69)/(0-51) 62/0     Patient Vitals for the past 72 hrs (Last 3 readings):   Weight   10/29/18 0713 71.5 kg (157 lb 10.1 oz)   10/28/18 0700 69.9 kg (154 lb 1.6 oz)   10/27/18 0800 69 kg (152 lb 1.9 oz)     Body mass index is 23.28 kg/m².      Intake/Output Summary (Last 24 hours) at 10/29/2018 1547  Last data filed at 10/29/2018 1400  Gross per 24 hour   Intake 1455.67 ml   Output 400 ml   Net 1055.67 ml       Hemodynamic Parameters:       Telemetry: Artifact from VAD    Physical Exam   Constitutional: He appears well-developed and well-nourished.   HENT:   Mouth/Throat: Oropharynx is clear and moist.   Neck: No JVD present.   Cardiovascular: Normal rate and regular rhythm.   Smooth VAD sounds heard.   Pulmonary/Chest: Effort normal. No respiratory distress. He has no wheezes. He has no rales.   Abdominal: Soft.  Bowel sounds are normal. He exhibits no distension. There is no tenderness. There is no guarding.   Musculoskeletal: He exhibits no edema.   Neurological: He is alert.   Skin: Skin is warm.   Nursing note and vitals reviewed.      Significant Labs:  CBC:  Recent Labs   Lab 10/28/18  1920 10/29/18  0635 10/29/18  0902   WBC 8.19 6.39 6.35   RBC 2.56* 2.83* 2.75*   HGB 6.8* 7.8* 7.5*   HCT 22.9* 25.4* 24.4*   * 107* 101*   MCV 90 90 89   MCH 26.6* 27.6 27.3   MCHC 29.7* 30.7* 30.7*     BNP:  Recent Labs   Lab 10/24/18  1519 10/26/18  0400 10/29/18  0501   * 123* 195*     CMP:  Recent Labs   Lab 10/25/18  0040  10/26/18  0400 10/27/18  0456 10/28/18  0658 10/29/18  0501      < > 87 73 83 80   CALCIUM 8.5*   < > 8.1* 8.3* 8.2* 7.8*   ALBUMIN 2.9*  --  2.8*  --   --  2.4*   PROT 5.4*  --  5.5*  --   --  4.6*   *   < > 136 140 137 136   K 4.6   < > 4.6 3.9 4.2 4.1   CO2 17*   < > 18* 22* 25 22*      < > 111* 112* 109 112*   BUN 61*   < > 46* 26* 17 13   CREATININE 1.9*   < > 1.3 0.9 0.9 0.8   ALKPHOS 43*  --  42*  --   --  55   ALT 12  --  17  --   --  13   AST 15  --  38  --   --  16   BILITOT 0.7  --  0.7  --   --  0.6    < > = values in this interval not displayed.      Coagulation:   Recent Labs   Lab 10/27/18  0456 10/28/18  0658 10/29/18  0501   INR 2.8* 2.0* 1.5*   APTT 27.8 27.4 28.9     LDH:  Recent Labs   Lab 10/27/18  0456 10/28/18  0658 10/29/18  0501    156 136     Microbiology:  Microbiology Results (last 7 days)     ** No results found for the last 168 hours. **          BMP:   Recent Labs   Lab 10/29/18  0501   GLU 80      K 4.1   *   CO2 22*   BUN 13   CREATININE 0.8   CALCIUM 7.8*   MG 2.2     Cardiac Markers: No results for input(s): CKMB, TROPONINT, MYOGLOBIN in the last 72 hours.  Coagulation:   Recent Labs   Lab 10/29/18  0501   INR 1.5*   APTT 28.9     I have reviewed all pertinent labs within the past 24 hours.    Estimated Creatinine Clearance:  88.4 mL/min (based on SCr of 0.8 mg/dL).    Diagnostic Results:  I have reviewed all pertinent imaging results/findings within the past 24 hours.

## 2018-10-29 NOTE — PLAN OF CARE
Problem: Patient Care Overview  Goal: Plan of Care Review  Outcome: Ongoing (interventions implemented as appropriate)  Pt verbalized understanding of plan of care. Informed patient of risk of falling.  Discussed fall prevention strategies. Bed locked and low.  Call light and personal items within reach. SR up x 2. VAD hum smooth. DL dsg change due tomorrow. LBM 10/28.

## 2018-10-29 NOTE — PLAN OF CARE
Problem: Patient Care Overview  Goal: Plan of Care Review  Outcome: Ongoing (interventions implemented as appropriate)  Pt free of falls and injuries on day of shift. Pt H/h 6.8/22.9, MAP 57, doppler 64/0; notified MD pt received one unit of PRBCs per MD order.Tolerated well, will continue to monitor pt for bleeding. INR 2.0. Heartmate 3 numbers WNL. Pt denies SOB and chest pain. GI consulted, possible DBE on Tuesday. VSS, will continue to monitor.

## 2018-10-29 NOTE — ASSESSMENT & PLAN NOTE
- Appreciate GI's help. GI Push enteroscopy negative for source  - INR 1.5, will continue with coumadin  - Had melena yesterday on 10/28 and seen by GI. Possibly will undergo DBE +/_ VCE with GI on Tuesday. FU their recomendations  - Off ASA 2/2 h/o GI bleeds since 2017, had h/o ileal resection.  Had EGD/Colonoscopy 8/2018 - mucosal nodule in the duodenum and 2 mm polyp in prox transverse colon, diverticulosis.  - No bridge due to GI bleed.

## 2018-10-29 NOTE — PROGRESS NOTES
10/29/2018  Liudmila Cook  Surgeon(s) and Role:     * Jessica Casillas MD - Primary  Current provider:  Ortega Leos MD      I, Liudmila Cook, rounded on Suman Hayden to ensure all mechanical assist device settings (IABP or VAD) were appropriate and all parameters were within limits.  I was able to ensure all back up equipment was present, the staff had no issues, and the Perfusion Department daily rounding was complete.    7:17 AM

## 2018-10-29 NOTE — ANESTHESIA POSTPROCEDURE EVALUATION
"Anesthesia Post Evaluation    Patient: Suman Hayden    Procedure(s) Performed: Procedure(s) (LRB):  EGD (ESOPHAGOGASTRODUODENOSCOPY) (N/A)    Final Anesthesia Type: general  Patient location during evaluation: ICU  Patient participation: Yes- Able to Participate  Level of consciousness: awake and alert and oriented  Post-procedure vital signs: reviewed and stable  Pain management: adequate  Airway patency: patent  PONV status at discharge: No PONV  Anesthetic complications: no      Cardiovascular status: hemodynamically stable  Respiratory status: unassisted  Hydration status: euvolemic  Follow-up not needed.        Visit Vitals  BP (!) 62/0   Pulse 80   Temp 37.5 °C (99.5 °F) (Oral)   Resp 16   Ht 5' 9" (1.753 m)   Wt 71.5 kg (157 lb 10.1 oz)   SpO2 100%   BMI 23.28 kg/m²       Pain/Gurpreet Score: Pain Assessment Performed: Yes (10/29/2018 12:00 PM)  Presence of Pain: denies (10/29/2018 10:00 AM)        "

## 2018-10-30 NOTE — ASSESSMENT & PLAN NOTE
-Symptomatic anemia, Acute blood loss anemia, unclear etiology, presumed GI bleed (prior hx, however no recent melena), doubt hemolysis (LD mildly elevated, Bili wnl):  - Hbg 4.2 on admit. S/p tranfusion  - INR 4.4 on admit.   - INR1.3 <- 1.5 today. Continue coumadin 1mg daily  - H/h 6.8 -> 7.5 -. 7.6, 1 unit PRBC on 10/28  -Monitor H/H closely.

## 2018-10-30 NOTE — PROGRESS NOTES
[facility]  Heart Transplant  Progress Note    Patient Name: Suman Hayden  MRN: [id number]  Admission Date: 10/24/2018  Hospital Length of Stay: 6 days  Attending Physician: Ortega Leos MD  Primary Care Provider: Joe Ernst MD  Principal Problem:GI bleed    Subjective:     Interval History: Patient seen and examined today at bedside. No complaints. Has been feeling well. Has occasional LH. No bowel movement yesterday.    Continuous Infusions:  Scheduled Meds:   amiodarone  400 mg Oral Daily    dronabinol  5 mg Oral TID    lisinopril  2.5 mg Oral Daily    mirtazapine  7.5 mg Oral Nightly    pantoprazole  40 mg Oral BID    pravastatin  20 mg Oral QHS    sodium chloride 0.9%  3 mL Intravenous Q8H    warfarin  1 mg Oral Daily     PRN Meds:sodium chloride, sodium chloride, sodium chloride, HYDROcodone-acetaminophen, polyethylene glycol    Review of patient's allergies indicates:   Allergen Reactions    Asa [aspirin] Other (See Comments)     Bleeding ulcer     Objective:     Vital Signs (Most Recent):  Temp: 98.6 °F (37 °C) (10/30/18 1513)  Pulse: 80 (10/30/18 1600)  Resp: 16 (10/30/18 1134)  BP: 92/72 (10/30/18 1134)  SpO2: 100 % (10/30/18 1134) Vital Signs (24h Range):  Temp:  [98.2 °F (36.8 °C)-98.8 °F (37.1 °C)] 98.6 °F (37 °C)  Pulse:  [64-80] 80  Resp:  [16-18] 16  SpO2:  [96 %-100 %] 100 %  BP: (80-92)/(0-72) 92/72     Patient Vitals for the past 72 hrs (Last 3 readings):   Weight   10/30/18 0700 73.7 kg (162 lb 7.7 oz)   10/29/18 0713 71.5 kg (157 lb 10.1 oz)   10/28/18 0700 69.9 kg (154 lb 1.6 oz)     Body mass index is 23.99 kg/m².      Intake/Output Summary (Last 24 hours) at 10/30/2018 1758  Last data filed at 10/30/2018 1400  Gross per 24 hour   Intake 660 ml   Output 775 ml   Net -115 ml       Hemodynamic Parameters:       Telemetry: No acute events.    Physical Exam   Constitutional: He is oriented to person, place, and time. He appears well-developed and  well-nourished.   HENT:   Mouth/Throat: Oropharynx is clear and moist.   Neck: No JVD present.   Cardiovascular: Normal rate and regular rhythm.   No murmur heard.  Smooth VAD sounds heard   Pulmonary/Chest: Effort normal and breath sounds normal. No respiratory distress. He has no wheezes. He has no rales.   Abdominal: Soft. Bowel sounds are normal. He exhibits no distension. There is no tenderness. There is no guarding.   Musculoskeletal: He exhibits no edema.   Neurological: He is alert and oriented to person, place, and time.   Skin: Skin is warm.   Nursing note and vitals reviewed.      Significant Labs:  CBC:  Recent Labs   Lab 10/29/18  0902 10/29/18  2003 10/30/18  1003   WBC 6.35 6.98 5.11   RBC 2.75* 2.88* 2.83*   HGB 7.5* 7.9* 7.6*   HCT 24.4* 25.1* 24.6*   * 107* 112*   MCV 89 87 87   MCH 27.3 27.4 26.9*   MCHC 30.7* 31.5* 30.9*     BNP:  Recent Labs   Lab 10/24/18  1519 10/26/18  0400 10/29/18  0501   * 123* 195*     CMP:  Recent Labs   Lab 10/25/18  0040  10/26/18  0400  10/28/18  0658 10/29/18  0501 10/30/18  0722      < > 87   < > 83 80 84   CALCIUM 8.5*   < > 8.1*   < > 8.2* 7.8* 8.4*   ALBUMIN 2.9*  --  2.8*  --   --  2.4*  --    PROT 5.4*  --  5.5*  --   --  4.6*  --    *   < > 136   < > 137 136 136   K 4.6   < > 4.6   < > 4.2 4.1 4.2   CO2 17*   < > 18*   < > 25 22* 24      < > 111*   < > 109 112* 109   BUN 61*   < > 46*   < > 17 13 11   CREATININE 1.9*   < > 1.3   < > 0.9 0.8 0.8   ALKPHOS 43*  --  42*  --   --  55  --    ALT 12  --  17  --   --  13  --    AST 15  --  38  --   --  16  --    BILITOT 0.7  --  0.7  --   --  0.6  --     < > = values in this interval not displayed.      Coagulation:   Recent Labs   Lab 10/28/18  0658 10/29/18  0501 10/30/18  0723   INR 2.0* 1.5* 1.3*   APTT 27.4 28.9 26.7     LDH:  Recent Labs   Lab 10/28/18  0658 10/29/18  0501 10/30/18  0723    136 163     Microbiology:  Microbiology Results (last 7 days)     ** No results  found for the last 168 hours. **          BMP:   Recent Labs   Lab 10/30/18  0722   GLU 84      K 4.2      CO2 24   BUN 11   CREATININE 0.8   CALCIUM 8.4*   MG 2.1     Cardiac Markers: No results for input(s): CKMB, TROPONINT, MYOGLOBIN in the last 72 hours.  Coagulation:   Recent Labs   Lab 10/30/18  0723   INR 1.3*   APTT 26.7     I have reviewed all pertinent labs within the past 24 hours.    Estimated Creatinine Clearance: 88.4 mL/min (based on SCr of 0.8 mg/dL).    Diagnostic Results:  I have reviewed all pertinent imaging results/findings within the past 24 hours.    Assessment and Plan:     Mr. Hayden is a 68 yoAAM, admitted to [facility] for symptomatic anemia in the setting of acute blood los anemia due to GI bleed. PMHx of NICM, S/P Heartmate 3 as DT therapy 7/27/17, ICD revision 11/20/17 with Dr. Vidal (DC ICD placed with active RA/LV leads (RV lead capped during revision) that was complicated by pocket hematoma, GI bleed, VT on Amiodarone. Patient is a transfer from [place] ER for acute blood loss anemia. HPI gathered  from spouse at bedside, state patient developed generalized weakness, worsening fatigability and pale appearance past 1 week. He presented to ER with c/o R sided chest pain, which patient describes as sharp shooting in nature, intermittent, 6/10, started. Further work up in ED revealed HBG ~ 4, baseline Hbg 9. Denies any GI bleed, no dark tarry stool, no melena, hematochezia, no hematuria, hemoptysis. Patient is on oral Iron but reports brown stools. Currently denies any chest pain, SOB, N/V/D. No VAD alarms.     Enroute given 1u pRBC. Repeat Hbg 5.3, INR 2.3 -> 4.4.             * GI bleed    - Appreciate GI's help. GI Push enteroscopy negative for source  - INR 1.5, will continue with coumadin  - Had melena on 10/28 and seen by GI.   Patency capsule given at 11:45 AM on 10/30. Will need to obtain Abdominal Xray after 30 hours.     Can start full liquid diet 2 hours after  patency capsule, then advance to regular 2 hours after that.            Possibly will undergo DBE after VCE likely. FU their recomendations  - Off ASA 2/2 h/o GI bleeds since 2017, had h/o ileal resection.  Had EGD/Colonoscopy 8/2018 - mucosal nodule in the duodenum and 2 mm polyp in prox transverse colon, diverticulosis.  - No bridge due to GI bleed.       INDIRA (acute kidney injury)    -INDIRA (Cr 1.9 on admit, down to 0.8 today, baseline 1.1):   -Likely in the setting of hypoperfusion   - back to baseline and lisinopril restarted at low dose       Anemia    -Symptomatic anemia, Acute blood loss anemia, unclear etiology, presumed GI bleed (prior hx, however no recent melena), doubt hemolysis (LD mildly elevated, Bili wnl):  - Hbg 4.2 on admit. S/p tranfusion  - INR 4.4 on admit.   - INR1.3 <- 1.5 today. Continue coumadin 1mg daily  - H/h 6.8 -> 7.5 -. 7.6, 1 unit PRBC on 10/28  -Monitor H/H closely.     LVAD (left ventricular assist device) present    [facility] s/p HM3 7/27/2017:   - No LFA recently on interrogation   - ASA stopped on 12/22/17 after GI bleed and ileal ulcer    - INR 1.5 -> 1.3 - Coumadin 1mg x1 today. No bridge due to bleeds.  - 's - 200s   - TTE 10/25 at 5200 rpm before getting 4 units PC's: LVEDD 3.7, AV does not open, septum midline.LVEF 10-15%, E/e' 22, RVSF- normal.  Plan to repeat as an outpatient.  -Plan to present him this week for Heart transplant.       Procedure: Device Interrogation Including analysis of device parameters  Current Settings: Ventricular Assist Device  Review of device function shows few PI events    TXP LVAD INTERROGATIONS 10/30/2018 10/30/2018 10/30/2018 10/29/2018 10/29/2018 10/29/2018 10/29/2018   Type HeartMate3 HeartMate3 HeartMate3 HeartMate3 HeartMate3 HeartMate3 HeartMate3   Flow 4.2 4.0 3.9 4.1 4.3 4.2 4.2   Speed 5250 5200 5200 5200 5200 5200 5200   PI 2.9 3.5 3.8 3.9 3.0 3.7 3.9   Power ([place]) 3.5 3.5 3.5 4.5 3.5 3.5 3.5   LSL - - 4800 - - - 4800   Pulsatility  - - - - - - -        VT (ventricular tachycardia)    Continue PTA Amiodarone  Caution previously developed VT with anemia  Cannot torelate Mexitil  - Had ICD interrogated with no acute events 10/2018.  -EP saw during last admission  Discharged on 10/17 with Hb -9.2) due to hypotension, dizziness, low flows- held mexiletine which was started 9/2018, though unlikely cause of symptoms. Rec compression stockings. Ablation at a later date. Had drop in Hb from 12.5 on 10/4 to 9.2  on 10/17.      Essential hypertension    Resume lisinopril 2.5mg BID   Has labile BP       Nonischemic cardiomyopathy    S/p LVAD    - LVEDD 6.2 / LVef 25%  - see VAD plan          Case d/w Dr. Joe Roland MD  Heart Transplant  [facility]

## 2018-10-30 NOTE — PROGRESS NOTES
"[facility] TRANSFUSION MEDICINE  Section of Transfusion Medicine and Histocompatibility  HLA Note    Case Details   Diagnosis:  No primary diagnosis found.  Blood Type: O POS  HLA Type:   Class I:  Lab Results   Component Value Date    BCCV7JN 23 07/06/2017    OTPY8CD 66 07/06/2017    BVUC2CZ 35 07/06/2017    UQUU7NQ 58 07/06/2017    WVRHA8DU 6 07/06/2017    YIWXQ9ZV 4 07/06/2017    ADXYZ2LC 4 07/06/2017    OIDXY6IS 7 07/06/2017     Class II:  Lab Results   Component Value Date    RAXUZN74SK 15 07/06/2017    GIWFVG62FR XX 07/06/2017    OIXPUG512QO 51 07/06/2017    QPQBOQ4635 XX 07/06/2017    UCQTV4FS 6 07/06/2017    DMQOE0UC XX 07/06/2017     Recent Antibody Screen/ID Results:   Lab Results   Component Value Date    MC5MFKU Negative 05/09/2018    CIIAB Negative 07/06/2017    ABCMT WEAK--DQ8(2479), DP19(1893) 05/09/2018     Auto T Cell Crossmatch Results:  Lab Results   Component Value Date    XMTCELLRES Negative 05/09/2018     Auto B Cell Crossmatch Results:  Lab Results   Component Value Date    BCELLRES Negative 05/09/2018     Assessment     Interpretation: Weak DQ8 antibodies (below 5000 MFI cut-off) are present. A virtual crossmatch is recommended.    Strongly Recommended Unacceptable Antigens: None    Optional Unacceptable Antigens: DQ8  Two of 3 beads are positive ("lined up"), and the MFIs are 2600 and 2358. If this is listed, the cPRA would be 24%.    Crossmatch Expectations: A prospective or retrospective flow cytometric crossmatch is expected to be negative since the anti-DQ8 is weak and DQ antigens have low expression on the target lymphocytes, but a weakly positive B cell flow crossmatch could occur. If DQ8 is listed as unacceptable, a prospective or retrospective flow cytometric crossmatch is expected to be negative.    Please call the HLA Lab [phone] with any concerns or questions.    EDGARDO De LaF uente MD, [place]  Section of Transfusion Medicine & Histocompatibility  Department of Pathology and Laboratory " Medicine [facility]  10/30/2018

## 2018-10-30 NOTE — ASSESSMENT & PLAN NOTE
[facility] s/p HM3 7/27/2017:   - No LFA recently on interrogation   - ASA stopped on 12/22/17 after GI bleed and ileal ulcer    - INR 1.5 -> 1.3 - Coumadin 1mg x1 today. No bridge due to bleeds.  - 's - 200s   - TTE 10/25 at 5200 rpm before getting 4 units PC's: LVEDD 3.7, AV does not open, septum midline.LVEF 10-15%, E/e' 22, RVSF- normal.  Plan to repeat as an outpatient.  -Plan to present him this week for Heart transplant.       Procedure: Device Interrogation Including analysis of device parameters  Current Settings: Ventricular Assist Device  Review of device function shows few PI events    TXP LVAD INTERROGATIONS 10/30/2018 10/30/2018 10/30/2018 10/29/2018 10/29/2018 10/29/2018 10/29/2018   Type HeartMate3 HeartMate3 HeartMate3 HeartMate3 HeartMate3 HeartMate3 HeartMate3   Flow 4.2 4.0 3.9 4.1 4.3 4.2 4.2   Speed 5250 5200 5200 5200 5200 5200 5200   PI 2.9 3.5 3.8 3.9 3.0 3.7 3.9   Power ([place]) 3.5 3.5 3.5 4.5 3.5 3.5 3.5   LSL - - 4800 - - - 4800   Pulsatility - - - - - - -

## 2018-10-30 NOTE — PHYSICIAN QUERY
"PT Name: Suman Hayden  MR #: [id number]    Physician Query Form - Heart  Condition Clarification     CDS/: Sergio Josue Jr, RN              Contact information:[email or web address]  This form is a permanent document in the medical record.     Query Date: October 30, 2018    By submitting this query, we are merely seeking further clarification of documentation. Please utilize your independent clinical judgment when addressing the question(s) below.    The medical record contains the following   Indicators     Supporting Clinical Findings Location in Medical Record   x -->123-->195 10/24-10/29 Labs    EF     x Radiology findings Impression    Stable chest x-ray as above.  No acute findings. 10/24 Chest X Ray    Echo Results      "Ascites" documented     x "SOB" or "PEREIRA" documented Respiratory: Negative for apnea, cough, choking, chest tightness, shortness of breath, wheezing and stridor.   10/25 H&P    "Hypoxia" documented     x Heart Failure documented Chronic combined systolic and diastolic congestive heart failure    Chronic systolic congestive heart failure      Acute on chronic combined systolic and diastolic heart failure 10/25 Anesthesia Pre Procedure Note    10/25 Anesthesia Pre Procedure Note    10/25 Anesthesia Pre Procedure Note   x "Edema" documented Musculoskeletal: Normal range of motion. He exhibits no edema.  10/25 H&P   x Diuretics/Meds furosemide tablet 40 mg Once   10/25 MAR   x Treatment: Will give Lasix 40mg once for 3u pRBC   10/25 H&P   x Other:  S/P Heartmate 3 as DT therapy 7/27/17   10/25 H&P   Heart failure (HF) can be acute, chronic or both. It is generally further specificed as systolic, diastolic, or combined. Lastly, it is important to identify an underlying etiology if known or suspected.     Common clues to acute exacerbation:  Rapidly progressive symptoms (w/in 2 weeks of presentation), using IV diuretics to treat, using supplemental O2, pulmonary edema on " Xray, MI w/in 4 weeks, and/or BNP >500    Systolic Heart Failure: is defined as chart documentation of a left ventricular ejection fraction (LVEF) less than 40%     Diastolic Heart Failure: is defined as a left ventricular ejection fraction (LVEF) greater than 40%   +      Evidence of diastolic dysfunction on echocardiography OR    Right heart catheterization wedge pressure above 12 mm Hg OR    Left heart catheterization left ventricular end diastolic pressure 18 mm Hg or above.    References: *American Heart Association    The clinical guidelines noted below are only system guidelines, and do not replace the providers clinical judgment.     Provider, please specify the diagnosis associated with above clinical findings    [  x ] Chronic Systolic Heart Failure - Pre-existing systolic HF diagnosis.  EF < 40%  without  acute HF symptoms documented   [   ] Chronic Combined Systolic and Diastolic Heart Failure  [   ] Other (please specify): ___________________________________  [  ] Clinically Undetermined                          Please document in your progress notes daily for the duration of treatment until resolved and include in your discharge summary.

## 2018-10-30 NOTE — PHYSICIAN QUERY
PT Name: Suman Hayden  MR #: [id number]     Physician Query Form - Documentation Clarification      CDS/: Sergio Josue Jr, RN              Contact information:[email or web address]    This form is a permanent document in the medical record.     Query Date: October 30, 2018    By submitting this query, we are merely seeking further clarification of documentation. Please utilize your independent clinical judgment when addressing the question(s) below.    The Medical record reflects the following:    Supporting Clinical Findings Location in Medical Record   Cardiomyopathy    CHF (congestive heart failure)    Hypertension    The left ventricle is normal in size, with an end-diastolic diameter of 3.7 cm, and an end-systolic diameter of 3.4 cm. LV wall thickness is normal, with the septum and the posterior wall each measuring 0.9 cm across. Relative wall   thickness was increased at 0.49, and the LV mass index was 57.7 g/m2 consistent with concentric remodeling.      Nonischemic cardiomyopathy    S/p LVAD               - LVEDD 6.2 / LVef 25%  - see VAD plan    10/25 H&P    10/25 H&P    10/25 H&P    10/25 2D Echo Report                  10/29 Heart Transplant Progress Note                                                                                      Doctor, Please specify diagnosis or diagnoses associated with above clinical findings.  Specify the Nonischemic Cardiomyopathy    Provider Use Only    (  x  )Nonischemic Dilated Cardiomyopathy     (    )Nonischemic Hypertrophic Cardiomyopathy    (    )Other_______________________________________________________                                                                                                              Clinically Undetermined

## 2018-10-30 NOTE — PLAN OF CARE
Problem: Patient Care Overview  Goal: Plan of Care Review  Outcome: Ongoing (interventions implemented as appropriate)  Pt verbalized understanding of plan of care. Informed patient of risk of falling.  Discussed Fall prevention strategies. Bed locked and low.  Call light and personal items within reach. SR up x 2.   VAD hum smooth. NO alarms. Wife to complete DL dsg change.   Patency capsule given at 1145, must  Have abd xray before 1730 on 10/31.

## 2018-10-30 NOTE — TREATMENT PLAN
Discussed case with Dr. Chatterjee.   Patient's H/H has been stable, and last BM on 10/28, black tarry.  Will plan on patency capsule, VCE if patent.     Patency capsule given at 11:45 AM on 10/30. Will need to obtain Abdominal Xray after 30 hours.    Can start full liquid diet 2 hours after patency capsule, then advance to regular 2 hours after that.

## 2018-10-30 NOTE — ASSESSMENT & PLAN NOTE
- Appreciate GI's help. GI Push enteroscopy negative for source  - INR 1.5, will continue with coumadin  - Had melena on 10/28 and seen by GI.   Patency capsule given at 11:45 AM on 10/30. Will need to obtain Abdominal Xray after 30 hours.     Can start full liquid diet 2 hours after patency capsule, then advance to regular 2 hours after that.            Possibly will undergo DBE after VCE likely. FU their recomendations  - Off ASA 2/2 h/o GI bleeds since 2017, had h/o ileal resection.  Had EGD/Colonoscopy 8/2018 - mucosal nodule in the duodenum and 2 mm polyp in prox transverse colon, diverticulosis.  - No bridge due to GI bleed.

## 2018-10-30 NOTE — SUBJECTIVE & OBJECTIVE
Interval History: Patient seen and examined today at bedside. No complaints. Has been feeling well. Has occasional LH. No bowel movement yesterday.    Continuous Infusions:  Scheduled Meds:   amiodarone  400 mg Oral Daily    dronabinol  5 mg Oral TID    lisinopril  2.5 mg Oral Daily    mirtazapine  7.5 mg Oral Nightly    pantoprazole  40 mg Oral BID    pravastatin  20 mg Oral QHS    sodium chloride 0.9%  3 mL Intravenous Q8H    warfarin  1 mg Oral Daily     PRN Meds:sodium chloride, sodium chloride, sodium chloride, HYDROcodone-acetaminophen, polyethylene glycol    Review of patient's allergies indicates:   Allergen Reactions    Asa [aspirin] Other (See Comments)     Bleeding ulcer     Objective:     Vital Signs (Most Recent):  Temp: 98.6 °F (37 °C) (10/30/18 1513)  Pulse: 80 (10/30/18 1600)  Resp: 16 (10/30/18 1134)  BP: 92/72 (10/30/18 1134)  SpO2: 100 % (10/30/18 1134) Vital Signs (24h Range):  Temp:  [98.2 °F (36.8 °C)-98.8 °F (37.1 °C)] 98.6 °F (37 °C)  Pulse:  [64-80] 80  Resp:  [16-18] 16  SpO2:  [96 %-100 %] 100 %  BP: (80-92)/(0-72) 92/72     Patient Vitals for the past 72 hrs (Last 3 readings):   Weight   10/30/18 0700 73.7 kg (162 lb 7.7 oz)   10/29/18 0713 71.5 kg (157 lb 10.1 oz)   10/28/18 0700 69.9 kg (154 lb 1.6 oz)     Body mass index is 23.99 kg/m².      Intake/Output Summary (Last 24 hours) at 10/30/2018 1758  Last data filed at 10/30/2018 1400  Gross per 24 hour   Intake 660 ml   Output 775 ml   Net -115 ml       Hemodynamic Parameters:       Telemetry: No acute events.    Physical Exam   Constitutional: He is oriented to person, place, and time. He appears well-developed and well-nourished.   HENT:   Mouth/Throat: Oropharynx is clear and moist.   Neck: No JVD present.   Cardiovascular: Normal rate and regular rhythm.   No murmur heard.  Smooth VAD sounds heard   Pulmonary/Chest: Effort normal and breath sounds normal. No respiratory distress. He has no wheezes. He has no rales.    Abdominal: Soft. Bowel sounds are normal. He exhibits no distension. There is no tenderness. There is no guarding.   Musculoskeletal: He exhibits no edema.   Neurological: He is alert and oriented to person, place, and time.   Skin: Skin is warm.   Nursing note and vitals reviewed.      Significant Labs:  CBC:  Recent Labs   Lab 10/29/18  0902 10/29/18  2003 10/30/18  1003   WBC 6.35 6.98 5.11   RBC 2.75* 2.88* 2.83*   HGB 7.5* 7.9* 7.6*   HCT 24.4* 25.1* 24.6*   * 107* 112*   MCV 89 87 87   MCH 27.3 27.4 26.9*   MCHC 30.7* 31.5* 30.9*     BNP:  Recent Labs   Lab 10/24/18  1519 10/26/18  0400 10/29/18  0501   * 123* 195*     CMP:  Recent Labs   Lab 10/25/18  0040  10/26/18  0400  10/28/18  0658 10/29/18  0501 10/30/18  0722      < > 87   < > 83 80 84   CALCIUM 8.5*   < > 8.1*   < > 8.2* 7.8* 8.4*   ALBUMIN 2.9*  --  2.8*  --   --  2.4*  --    PROT 5.4*  --  5.5*  --   --  4.6*  --    *   < > 136   < > 137 136 136   K 4.6   < > 4.6   < > 4.2 4.1 4.2   CO2 17*   < > 18*   < > 25 22* 24      < > 111*   < > 109 112* 109   BUN 61*   < > 46*   < > 17 13 11   CREATININE 1.9*   < > 1.3   < > 0.9 0.8 0.8   ALKPHOS 43*  --  42*  --   --  55  --    ALT 12  --  17  --   --  13  --    AST 15  --  38  --   --  16  --    BILITOT 0.7  --  0.7  --   --  0.6  --     < > = values in this interval not displayed.      Coagulation:   Recent Labs   Lab 10/28/18  0658 10/29/18  0501 10/30/18  0723   INR 2.0* 1.5* 1.3*   APTT 27.4 28.9 26.7     LDH:  Recent Labs   Lab 10/28/18  0658 10/29/18  0501 10/30/18  0723    136 163     Microbiology:  Microbiology Results (last 7 days)     ** No results found for the last 168 hours. **          BMP:   Recent Labs   Lab 10/30/18  0722   GLU 84      K 4.2      CO2 24   BUN 11   CREATININE 0.8   CALCIUM 8.4*   MG 2.1     Cardiac Markers: No results for input(s): CKMB, TROPONINT, MYOGLOBIN in the last 72 hours.  Coagulation:   Recent Labs   Lab  10/30/18  0723   INR 1.3*   APTT 26.7     I have reviewed all pertinent labs within the past 24 hours.    Estimated Creatinine Clearance: 88.4 mL/min (based on SCr of 0.8 mg/dL).    Diagnostic Results:  I have reviewed all pertinent imaging results/findings within the past 24 hours.

## 2018-10-30 NOTE — PROGRESS NOTES
" [facility]  Adult Nutrition  Progress Note    SUMMARY       Recommendations    Recommendation/Intervention:   1. When medically appropriate, advance diet to cardiac with Boost Plus. Intake had been good when on diet prior to new GIB.  2. RD following.    Goals: Advancement of diet by RD follow up  Nutrition Goal Status: progressing towards goal  Communication of RD Recs: (POC)    Reason for Assessment    Reason for Assessment: RD follow-up  Diagnosis: gastrointestinal disease(anemia due to GIB)  Relevant Medical History: NICM, s/p LVAD, multiple GIBs, CHF, CAD, HLD, HTN, s/p AICD  Interdisciplinary Rounds: attended    General Information Comments: Pt NPO for another GIB. Had been on regular diet with good PO intake.    Nutrition Discharge Planning: Adequate PO intake on cardiac diet    Nutrition Risk Screen    Nutrition Risk Screen: no indicators present    Nutrition/Diet History    Do you have any cultural, spiritual, Latter-day conflicts, given your current situation?: Restoration   Supplemental Drinks or Food Habits: Boost Plus  Factors Affecting Nutritional Intake: NPO    Anthropometrics    Temp: 98.3 °F (36.8 °C)  Height Method: Stated  Height: 5' 9" (175.3 cm)  Height (inches): 69 in  Weight Method: Standard Scale  Weight: 73.7 kg (162 lb 7.7 oz)  Weight (lb): 162.48 lb  Ideal Body Weight (IBW), Male: 160 lb  % Ideal Body Weight, Male (lb): 101.55 lb  BMI (Calculated): 24  BMI Grade: 18.5-24.9 - normal       Lab/Procedures/Meds    Pertinent Labs Reviewed: reviewed  Pertinent Labs Comments: Noted  Pertinent Medications Reviewed: reviewed  Pertinent Medications Comments: Marinol, mirtazapine, pantoprazole, statin, warfarin    Physical Findings/Assessment    Overall Physical Appearance: nourished  Oral/Mouth Cavity: tooth/teeth missing  Skin: intact    Estimated/Assessed Needs    Weight Used For Calorie Calculations: 70.5 kg (155 lb 6.8 oz)  Energy Calorie Requirements (kcal): 1832  Energy Need " Method: [place](x 1.25 (PAL))  Protein Requirements: 71-85 gm (1.0-1.2 gm/kg)  Weight Used For Protein Calculations: 70.5 kg (155 lb 6.8 oz)  Fluid Requirements (mL): per MD     RDA Method (mL): 1832       Nutrition Prescription Ordered    Current Diet Order: NPO    Evaluation of Received Nutrient/Fluid Intake    I/O: +865ml  Comments: LBM 10/28  % Intake of Estimated Energy Needs: 0 - 25 % (was 75% of meals prior to NPO)  % Meal Intake: NPO    Nutrition Risk    Level of Risk/Frequency of Follow-up: low     Assessment and Plan    Nutrition Problem  Inadequate energy intake     Related to (etiology):   Altered GI function     Signs and Symptoms (as evidenced by):   NPO with no alternate means for nutrition      Interventions/Recommendations (treatment strategy):  Decreased sodium diet, commercial beverage     Nutrition Diagnosis Status:   Continues    Monitor and Evaluation    Food and Nutrient Intake: energy intake, food and beverage intake  Food and Nutrient Adminstration: diet order  Physical Activity and Function: nutrition-related ADLs and IADLs  Anthropometric Measurements: weight, weight change, body mass index  Biochemical Data, Medical Tests and Procedures: electrolyte and renal panel, gastrointestinal profile, glucose/endocrine profile, inflammatory profile  Nutrition-Focused Physical Findings: overall appearance     Nutrition Follow-Up    RD Follow-up?: Yes

## 2018-10-30 NOTE — PLAN OF CARE
Problem: Patient Care Overview  Goal: Plan of Care Review  Outcome: Ongoing (interventions implemented as appropriate)  AAOx4. No signs of distress, unlabored breathing. Safety/ medical/ medication protocol initiated. Patient NPO after midnight. Today's plan of care explained. Pt has no questions/ concerns at this time. Will continue to monitor.

## 2018-10-31 PROBLEM — R73.9 HYPERGLYCEMIA: Status: RESOLVED | Noted: 2017-07-27 | Resolved: 2018-01-01

## 2018-10-31 PROBLEM — E80.6 HYPERBILIRUBINEMIA: Chronic | Status: RESOLVED | Noted: 2017-07-18 | Resolved: 2018-01-01

## 2018-10-31 PROBLEM — T14.8XXA WOUND DRAINAGE: Status: RESOLVED | Noted: 2018-01-01 | Resolved: 2018-01-01

## 2018-10-31 NOTE — SUBJECTIVE & OBJECTIVE
Interval History: Patient seen and examined today at bedside. Had good bowel movement in am, no blood/zeinab. No LH, alarms overnight. Denied any symptoms. Sitting in chair with no discomfort.    Continuous Infusions:  Scheduled Meds:   amiodarone  400 mg Oral Daily    dronabinol  5 mg Oral TID    lisinopril  2.5 mg Oral Daily    mirtazapine  7.5 mg Oral Nightly    pantoprazole  40 mg Oral BID    pravastatin  20 mg Oral QHS    sodium chloride 0.9%  3 mL Intravenous Q8H    warfarin  2 mg Oral Daily     PRN Meds:sodium chloride, sodium chloride, sodium chloride, HYDROcodone-acetaminophen, polyethylene glycol    Review of patient's allergies indicates:   Allergen Reactions    Asa [aspirin] Other (See Comments)     Bleeding ulcer     Objective:     Vital Signs (Most Recent):  Temp: 98.4 °F (36.9 °C) (10/31/18 0756)  Pulse: 79 (10/31/18 0756)  Resp: 20 (10/31/18 0756)  BP: 99/75 (10/31/18 0756)  SpO2: 99 % (10/31/18 0756) Vital Signs (24h Range):  Temp:  [98.1 °F (36.7 °C)-98.9 °F (37.2 °C)] 98.4 °F (36.9 °C)  Pulse:  [64-80] 79  Resp:  [15-20] 20  SpO2:  [97 %-100 %] 99 %  BP: ()/(0-75) 99/75     Patient Vitals for the past 72 hrs (Last 3 readings):   Weight   10/31/18 0700 72.9 kg (160 lb 11.5 oz)   10/30/18 0700 73.7 kg (162 lb 7.7 oz)   10/29/18 0713 71.5 kg (157 lb 10.1 oz)     Body mass index is 23.73 kg/m².      Intake/Output Summary (Last 24 hours) at 10/31/2018 1132  Last data filed at 10/31/2018 0500  Gross per 24 hour   Intake 240 ml   Output 1125 ml   Net -885 ml       Hemodynamic Parameters:       Telemetry: No acute events.    Physical Exam   Constitutional: He is oriented to person, place, and time. He appears well-developed and well-nourished.   HENT:   Mouth/Throat: Oropharynx is clear and moist.   Neck: No JVD present.   Cardiovascular: Normal rate and regular rhythm.   Smooth VAD sounds heard.   Pulmonary/Chest: Effort normal and breath sounds normal. No respiratory distress. He has no  wheezes. He has no rales.   Abdominal: Soft. Bowel sounds are normal. He exhibits no distension. There is no tenderness.   Musculoskeletal: He exhibits no edema.   Neurological: He is alert and oriented to person, place, and time.   Skin: Skin is warm.   Nursing note and vitals reviewed.      Significant Labs:  CBC:  Recent Labs   Lab 10/30/18  1003 10/30/18  1949 10/31/18  0814   WBC 5.11 5.65 5.52   RBC 2.83* 2.86* 2.83*   HGB 7.6* 7.9* 7.5*   HCT 24.6* 25.1* 24.7*   * 123* 138*   MCV 87 88 87   MCH 26.9* 27.6 26.5*   MCHC 30.9* 31.5* 30.4*     BNP:  Recent Labs   Lab 10/26/18  0400 10/29/18  0501 10/31/18  0455   * 195* 308*     CMP:  Recent Labs   Lab 10/26/18  0400  10/29/18  0501 10/30/18  0722 10/31/18  0455   GLU 87   < > 80 84 90   CALCIUM 8.1*   < > 7.8* 8.4* 8.3*   ALBUMIN 2.8*  --  2.4*  --  2.4*   PROT 5.5*  --  4.6*  --  4.9*      < > 136 136 136   K 4.6   < > 4.1 4.2 3.9   CO2 18*   < > 22* 24 25   *   < > 112* 109 108   BUN 46*   < > 13 11 9   CREATININE 1.3   < > 0.8 0.8 0.8   ALKPHOS 42*  --  55  --  63   ALT 17  --  13  --  15   AST 38  --  16  --  20   BILITOT 0.7  --  0.6  --  0.4    < > = values in this interval not displayed.      Coagulation:   Recent Labs   Lab 10/29/18  0501 10/30/18  0723 10/31/18  0455   INR 1.5* 1.3* 1.1   APTT 28.9 26.7 23.4     LDH:  Recent Labs   Lab 10/29/18  0501 10/30/18  0723 10/31/18  0455    163 146     Microbiology:  Microbiology Results (last 7 days)     ** No results found for the last 168 hours. **          BMP:   Recent Labs   Lab 10/31/18  0455   GLU 90      K 3.9      CO2 25   BUN 9   CREATININE 0.8   CALCIUM 8.3*   MG 2.2     Cardiac Markers: No results for input(s): CKMB, TROPONINT, MYOGLOBIN in the last 72 hours.  Coagulation:   Recent Labs   Lab 10/31/18  0455   INR 1.1   APTT 23.4     Prealbumin:   Recent Labs   Lab 10/31/18  0455   PREALBUMIN 14*     I have reviewed all pertinent labs within the past 24  hours.    Estimated Creatinine Clearance: 88.4 mL/min (based on SCr of 0.8 mg/dL).    Diagnostic Results:  I have reviewed all pertinent imaging results/findings within the past 24 hours.

## 2018-10-31 NOTE — PLAN OF CARE
Problem: Patient Care Overview  Goal: Plan of Care Review  Outcome: Ongoing (interventions implemented as appropriate)  Patient free from falls and injuries throughout shift.  AAO and VSS.  LVAD working WNL; dressing due 10/31/2018.  Patient continues on amiodarone 400 mg PO and pantoprazole 40 mg BID.  LVEF 10-15%.  H/H 7.9/25.1.  No acute events overnight. Patient resting well at this time.  Plan of care discussed with patient.  Patient verbalizes understanding.  Will continue to monitor.

## 2018-10-31 NOTE — PLAN OF CARE
Problem: Patient Care Overview  Goal: Plan of Care Review  Outcome: Ongoing (interventions implemented as appropriate)  Pt free of falls/trauma/injuries.  Denies c/o SOB, CP, or discomfort.  Generalized skin remains CDI; edema noted.  LVAD working properly this shift without any complications.  LVAD dressing to be changed daily with soap/water /kit; dressing remains CDI and done by wife who is at bedside.Pt received n/o for coumadin dose change .Doppler and Lvad perimeters being monitored Q 4 hrs.  Plan to continue advanced options.  Pt tolerating plan of care.

## 2018-10-31 NOTE — PROGRESS NOTES
[facility]  Heart Transplant  Progress Note    Patient Name: Suman Hayden  MRN: [id number]  Admission Date: 10/24/2018  Hospital Length of Stay: 7 days  Attending Physician: Ortega Leos MD  Primary Care Provider: Joe Ernst MD  Principal Problem:GI bleed    Subjective:     Interval History: Patient seen and examined today at bedside. Had good bowel movement in am, no blood/zeinab. No LH, alarms overnight. Denied any symptoms. Sitting in chair with no discomfort.    Continuous Infusions:  Scheduled Meds:   amiodarone  400 mg Oral Daily    dronabinol  5 mg Oral TID    lisinopril  2.5 mg Oral Daily    mirtazapine  7.5 mg Oral Nightly    pantoprazole  40 mg Oral BID    pravastatin  20 mg Oral QHS    sodium chloride 0.9%  3 mL Intravenous Q8H    warfarin  2 mg Oral Daily     PRN Meds:sodium chloride, sodium chloride, sodium chloride, HYDROcodone-acetaminophen, polyethylene glycol    Review of patient's allergies indicates:   Allergen Reactions    Asa [aspirin] Other (See Comments)     Bleeding ulcer     Objective:     Vital Signs (Most Recent):  Temp: 98.4 °F (36.9 °C) (10/31/18 0756)  Pulse: 79 (10/31/18 0756)  Resp: 20 (10/31/18 0756)  BP: 99/75 (10/31/18 0756)  SpO2: 99 % (10/31/18 0756) Vital Signs (24h Range):  Temp:  [98.1 °F (36.7 °C)-98.9 °F (37.2 °C)] 98.4 °F (36.9 °C)  Pulse:  [64-80] 79  Resp:  [15-20] 20  SpO2:  [97 %-100 %] 99 %  BP: ()/(0-75) 99/75     Patient Vitals for the past 72 hrs (Last 3 readings):   Weight   10/31/18 0700 72.9 kg (160 lb 11.5 oz)   10/30/18 0700 73.7 kg (162 lb 7.7 oz)   10/29/18 0713 71.5 kg (157 lb 10.1 oz)     Body mass index is 23.73 kg/m².      Intake/Output Summary (Last 24 hours) at 10/31/2018 1132  Last data filed at 10/31/2018 0500  Gross per 24 hour   Intake 240 ml   Output 1125 ml   Net -885 ml       Hemodynamic Parameters:       Telemetry: No acute events.    Physical Exam   Constitutional: He is oriented to person, place,  and time. He appears well-developed and well-nourished.   HENT:   Mouth/Throat: Oropharynx is clear and moist.   Neck: No JVD present.   Cardiovascular: Normal rate and regular rhythm.   Smooth VAD sounds heard.   Pulmonary/Chest: Effort normal and breath sounds normal. No respiratory distress. He has no wheezes. He has no rales.   Abdominal: Soft. Bowel sounds are normal. He exhibits no distension. There is no tenderness.   Musculoskeletal: He exhibits no edema.   Neurological: He is alert and oriented to person, place, and time.   Skin: Skin is warm.   Nursing note and vitals reviewed.      Significant Labs:  CBC:  Recent Labs   Lab 10/30/18  1003 10/30/18  1949 10/31/18  0814   WBC 5.11 5.65 5.52   RBC 2.83* 2.86* 2.83*   HGB 7.6* 7.9* 7.5*   HCT 24.6* 25.1* 24.7*   * 123* 138*   MCV 87 88 87   MCH 26.9* 27.6 26.5*   MCHC 30.9* 31.5* 30.4*     BNP:  Recent Labs   Lab 10/26/18  0400 10/29/18  0501 10/31/18  0455   * 195* 308*     CMP:  Recent Labs   Lab 10/26/18  0400  10/29/18  0501 10/30/18  0722 10/31/18  0455   GLU 87   < > 80 84 90   CALCIUM 8.1*   < > 7.8* 8.4* 8.3*   ALBUMIN 2.8*  --  2.4*  --  2.4*   PROT 5.5*  --  4.6*  --  4.9*      < > 136 136 136   K 4.6   < > 4.1 4.2 3.9   CO2 18*   < > 22* 24 25   *   < > 112* 109 108   BUN 46*   < > 13 11 9   CREATININE 1.3   < > 0.8 0.8 0.8   ALKPHOS 42*  --  55  --  63   ALT 17  --  13  --  15   AST 38  --  16  --  20   BILITOT 0.7  --  0.6  --  0.4    < > = values in this interval not displayed.      Coagulation:   Recent Labs   Lab 10/29/18  0501 10/30/18  0723 10/31/18  0455   INR 1.5* 1.3* 1.1   APTT 28.9 26.7 23.4     LDH:  Recent Labs   Lab 10/29/18  0501 10/30/18  0723 10/31/18  0455    163 146     Microbiology:  Microbiology Results (last 7 days)     ** No results found for the last 168 hours. **          BMP:   Recent Labs   Lab 10/31/18  0455   GLU 90      K 3.9      CO2 25   BUN 9   CREATININE 0.8   CALCIUM  8.3*   MG 2.2     Cardiac Markers: No results for input(s): CKMB, TROPONINT, MYOGLOBIN in the last 72 hours.  Coagulation:   Recent Labs   Lab 10/31/18  0455   INR 1.1   APTT 23.4     Prealbumin:   Recent Labs   Lab 10/31/18  0455   PREALBUMIN 14*     I have reviewed all pertinent labs within the past 24 hours.    Estimated Creatinine Clearance: 88.4 mL/min (based on SCr of 0.8 mg/dL).    Diagnostic Results:  I have reviewed all pertinent imaging results/findings within the past 24 hours.    Assessment and Plan:     Mr. Hayden is a 68 yoAAM, [facility] for symptomatic anemia in the setting of acute blood los anemia due to GI bleed. PMHx of NICM, S/P Heartmate 3 as DT therapy 7/27/17, ICD revision 11/20/17 with Dr. Vidal (DC ICD placed with active RA/LV leads (RV lead capped during revision) that was complicated by pocket hematoma, GI bleed, VT on Amiodarone. Patient is a transfer from [place] ER for acute blood loss anemia. HPI gathered  from spouse at bedside, state patient developed generalized weakness, worsening fatigability and pale appearance past 1 week. He presented to ER with c/o R sided chest pain, which patient describes as sharp shooting in nature, intermittent, 6/10, started. Further work up in ED revealed HBG ~ 4, baseline Hbg 9. Denies any GI bleed, no dark tarry stool, no melena, hematochezia, no hematuria, hemoptysis. Patient is on oral Iron but reports brown stools. Currently denies any chest pain, SOB, N/V/D. No VAD alarms.     Enroute given 1u pRBC. Repeat Hbg 5.3, INR 2.3 -> 4.4.             * GI bleed    - Appreciate GI's help. GI Push enteroscopy negative for source  - INR 1.5 -> 1.3 ->1.1, will continue with coumadin, increased to 2 mg from 1 mg on 10/31/2018  - Had melena on 10/28 and seen by GI.   Patency capsule given at 11:45 AM on 10/30. Will need to obtain Abdominal Xray after 30 hours.     Can start full liquid diet 2 hours after patency capsule, then advance to regular 2  hours after that.            Possibly will undergo DBE after VCE likely. FU their recomendations  - Off ASA 2/2 h/o GI bleeds since 2017, had h/o ileal resection.  Had EGD/Colonoscopy 8/2018 - mucosal nodule in the duodenum and 2 mm polyp in prox transverse colon, diverticulosis.  - No bridge due to GI bleed.       INDIRA (acute kidney injury)    -INDIRA (Cr 1.9 on admit, down to 0.8 today, baseline 1.1):   -Likely in the setting of hypoperfusion   - back to baseline and lisinopril restarted at low dose       Anemia    -Symptomatic anemia, Acute blood loss anemia, unclear etiology, presumed GI bleed (prior hx, however no recent melena), doubt hemolysis (LD mildly elevated, Bili wnl):  - Hbg 4.2 on admit. S/p tranfusion  - INR 4.4 on admit.   - INR 1.1 <- 1.3 <- 1.5 today. Continue coumadin 1mg daily  - H/h 6.8 -> 7.5 -. 7.6 -> 7.5, 1 unit PRBC on 10/28  -Monitor H/H closely.     LVAD (left ventricular assist device) present    [facility] s/p HM3 7/27/2017:   - No LFA recently on interrogation   - ASA stopped on 12/22/17 after GI bleed and ileal ulcer    - INR 1.5 -> 1.3 - Coumadin 2mg jonas. No bridge due to bleeds. Keep < 1.7 for GI procedures.  - 's - 200s   - TTE 10/25 at 5200 rpm before getting 4 units PC's: LVEDD 3.7, AV does not open, septum midline.LVEF 10-15%, E/e' 22, RVSF- normal.  Plan to repeat as an outpatient.  -Plan to present him this week for Heart transplant.       Procedure: Device Interrogation Including analysis of device parameters  Current Settings: Ventricular Assist Device  Review of device function shows few PI events    TXP LVAD INTERROGATIONS 10/31/2018 10/31/2018 10/30/2018 10/30/2018 10/30/2018 10/30/2018 10/29/2018   Type HeartMate3 HeartMate3 HeartMate3 HeartMate3 HeartMate3 HeartMate3 HeartMate3   Flow 4.6 4.3 4.5 4.2 4.0 3.9 4.1   Speed 5200 5200 5200 5250 5200 5200 5200   PI 2.6 2.4 2.0 2.9 3.5 3.8 3.9   Power ([place]) 3.5 3.5 3.5 3.5 3.5 3.5 4.5   LSL - - - - - 4800 -   Pulsatility - -  - - - - -        VT (ventricular tachycardia)    Continue PTA Amiodarone  Caution previously developed VT with anemia  Cannot torelate Mexitil  - Had ICD interrogated with no acute events 10/2018.  -EP saw during last admission  Discharged on 10/17 with Hb -9.2) due to hypotension, dizziness, low flows- held mexiletine which was started 9/2018, though unlikely cause of symptoms. Rec compression stockings. Ablation at a later date. Had drop in Hb from 12.5 on 10/4 to 9.2  on 10/17.      Essential hypertension    Resume lisinopril 2.5mg BID   Has labile BP       Nonischemic cardiomyopathy    S/p LVAD    - LVEDD 6.2 / LVef 25%  - see VAD plan        Case d/w Dr. Duff. Plan for VCE and then DBE by GI. Uptitrate coumadin after procedures. Likely DC next week.    Alisha Roland MD  Heart Transplant  [facility]

## 2018-10-31 NOTE — COMMITTEE REVIEW
Native Organ Dx: NICM    Unable to determine transplant candidacy at this time due to need for fundraising to cover copays. Will plan to reassess at the end of the year.  SW will continue to work with the pt and wife on this.     Note was written by Corinne Kapadia.    ==========================================================    I was present at the meeting and attest to the decision of the committee  Referring notfiied by phone call

## 2018-10-31 NOTE — ASSESSMENT & PLAN NOTE
[facility] s/p HM3 7/27/2017:   - No LFA recently on interrogation   - ASA stopped on 12/22/17 after GI bleed and ileal ulcer    - INR 1.5 -> 1.3 - Coumadin 2mg jonas. No bridge due to bleeds. Keep < 1.7 for GI procedures.  - 's - 200s   - TTE 10/25 at 5200 rpm before getting 4 units PC's: LVEDD 3.7, AV does not open, septum midline.LVEF 10-15%, E/e' 22, RVSF- normal.  Plan to repeat as an outpatient.  -Plan to present him this week for Heart transplant.       Procedure: Device Interrogation Including analysis of device parameters  Current Settings: Ventricular Assist Device  Review of device function shows few PI events    TXP LVAD INTERROGATIONS 10/31/2018 10/31/2018 10/30/2018 10/30/2018 10/30/2018 10/30/2018 10/29/2018   Type HeartMate3 HeartMate3 HeartMate3 HeartMate3 HeartMate3 HeartMate3 HeartMate3   Flow 4.6 4.3 4.5 4.2 4.0 3.9 4.1   Speed 5200 5200 5200 5250 5200 5200 5200   PI 2.6 2.4 2.0 2.9 3.5 3.8 3.9   Power ([place]) 3.5 3.5 3.5 3.5 3.5 3.5 4.5   LSL - - - - - 4800 -   Pulsatility - - - - - - -

## 2018-10-31 NOTE — PT/OT/SLP PROGRESS
Physical Therapy Screen and Discharge       Patient Name:  Suman Hayden   MRN:  [id number]    Pt admitted to hospital for GI bleed. Pt found sitting in chair eating lunch. Pt reported no concerns with LVAD management or functional mobility. No alarms sounded. Educated pt on importance of OOB activity and daily ambulation. D/c from acute PT 2nd to pt at [facility].    Phoebe Tapia, PT, DPT  10/31/2018  [phone]

## 2018-10-31 NOTE — ASSESSMENT & PLAN NOTE
- Appreciate GI's help. GI Push enteroscopy negative for source  - INR 1.5 -> 1.3 ->1.1, will continue with coumadin, increased to 2 mg from 1 mg on 10/31/2018  - Had melena on 10/28 and seen by GI.   Patency capsule given at 11:45 AM on 10/30. Will need to obtain Abdominal Xray after 30 hours.     Can start full liquid diet 2 hours after patency capsule, then advance to regular 2 hours after that.            Possibly will undergo DBE after VCE likely. FU their recomendations  - Off ASA 2/2 h/o GI bleeds since 2017, had h/o ileal resection.  Had EGD/Colonoscopy 8/2018 - mucosal nodule in the duodenum and 2 mm polyp in prox transverse colon, diverticulosis.  - No bridge due to GI bleed.

## 2018-10-31 NOTE — ASSESSMENT & PLAN NOTE
-Symptomatic anemia, Acute blood loss anemia, unclear etiology, presumed GI bleed (prior hx, however no recent melena), doubt hemolysis (LD mildly elevated, Bili wnl):  - Hbg 4.2 on admit. S/p tranfusion  - INR 4.4 on admit.   - INR 1.1 <- 1.3 <- 1.5 today. Continue coumadin 1mg daily  - H/h 6.8 -> 7.5 -. 7.6 -> 7.5, 1 unit PRBC on 10/28  -Monitor H/H closely.

## 2018-11-01 NOTE — ASSESSMENT & PLAN NOTE
- INDIRA (Cr 1.9 on admit, down to 0.8 today)   - Likely in the setting of hypoperfusion   - Back to baseline and lisinopril restarted at low dose

## 2018-11-01 NOTE — ASSESSMENT & PLAN NOTE
- [facility] s/p HM3 7/27/2017  - No LFA recently on interrogation   - ASA stopped on 12/22/17 after GI bleed and ileal ulcer    - INR 1.1 today. Goal 2-3. No bridge due to bleeds. Keep < 1.7 for GI procedures   - continue coumadin  - LDH stable  - TTE 10/25 at 5200 rpm before getting 4 units PC's: LVEDD 3.7, AV does not open, septum midline.LVEF 10-15%, E/e' 22, RVSF- normal.  Plan to repeat as an outpatient.  - Plan to present him this week for Heart transplant.       Procedure: Device Interrogation Including analysis of device parameters  Current Settings: Ventricular Assist Device  Review of device function shows few PI events    TXP LVAD INTERROGATIONS 11/1/2018 11/1/2018 10/31/2018 10/31/2018 10/31/2018 10/31/2018 10/31/2018   Type HeartMate3 HeartMate3 HeartMate3 HeartMate3 HeartMate3 HeartMate3 HeartMate3   Flow 4.0 4.0 4.2 4.2 3.8 4.6 4.6   Speed 5200 5200 5200 5200 5200 5200 5200   PI 3.4 3.4 3.5 4.0 3.5 2.6 2.6   Power ([place]) 3.5 3.5 3.5 3.5 3.5 3.5 3.5   LSL - - - 4800 4800 4800 4800   Pulsatility - - No Pulse No Pulse No Pulse No Pulse Pulse

## 2018-11-01 NOTE — ASSESSMENT & PLAN NOTE
-Symptomatic anemia, Acute blood loss anemia, unclear etiology, presumed GI bleed (prior hx, however no recent melena), doubt hemolysis (LD mildly elevated, Bili wnl):  - Hbg 4.2 on admit. S/p tranfusion  - INR 4.4 on admit.   - S/P 4 U PRBCs this admission  - H & H stable today 8.6/26.9  - Monitor H/H closely.

## 2018-11-01 NOTE — PLAN OF CARE
Problem: Patient Care Overview  Goal: Plan of Care Review  Outcome: Ongoing (interventions implemented as appropriate)  Patient free from falls and injuries throughout shift.  AAO and VSS.  LVAD working WNL; dressing changed per wife.  Next dressing due 11/2/2018.  Patient continues on amiodarone 400 mg PO and pantoprazole 40 mg BID.  LVEF 10-15%.  H/H 8.6/26.9. Patient coumadin increased to 2mg PO daily; INR 1.1.  No acute events overnight. Patient resting well at this time.  Plan of care discussed with patient.  Patient verbalizes understanding.  Will continue to monitor.

## 2018-11-01 NOTE — SUBJECTIVE & OBJECTIVE
Interval History: BM overnight, dark but no BRB. Feels well. No complaints.    Scheduled Meds:   amiodarone  400 mg Oral Daily    dronabinol  5 mg Oral TID    lisinopril  2.5 mg Oral Daily    mirtazapine  7.5 mg Oral Nightly    pantoprazole  40 mg Oral BID    pravastatin  20 mg Oral QHS    sodium chloride 0.9%  3 mL Intravenous Q8H    warfarin  3 mg Oral Daily     PRN Meds:sodium chloride, sodium chloride, sodium chloride, HYDROcodone-acetaminophen, polyethylene glycol    Review of patient's allergies indicates:   Allergen Reactions    Asa [aspirin] Other (See Comments)     Bleeding ulcer     Objective:     Vital Signs (Most Recent):  Temp: 98.7 °F (37.1 °C) (11/01/18 0843)  Pulse: 79 (11/01/18 1103)  Resp: 18 (11/01/18 0843)  BP: (!) 95/58 (11/01/18 0843)  SpO2: 98 % (11/01/18 0843) Vital Signs (24h Range):  Temp:  [97.6 °F (36.4 °C)-99.1 °F (37.3 °C)] 98.7 °F (37.1 °C)  Pulse:  [66-85] 79  Resp:  [18-20] 18  SpO2:  [93 %-98 %] 98 %  BP: (64-95)/(0-70) 95/58     Patient Vitals for the past 72 hrs (Last 3 readings):   Weight   11/01/18 0800 72.6 kg (160 lb 0.9 oz)   10/31/18 0700 72.9 kg (160 lb 11.5 oz)   10/30/18 0700 73.7 kg (162 lb 7.7 oz)     Body mass index is 23.64 kg/m².      Intake/Output Summary (Last 24 hours) at 11/1/2018 1126  Last data filed at 11/1/2018 0500  Gross per 24 hour   Intake 720 ml   Output 725 ml   Net -5 ml       Hemodynamic Parameters:     Telemetry: No acute events.    Physical Exam   Constitutional: He is oriented to person, place, and time. He appears well-developed and well-nourished.   HENT:   Mouth/Throat: Oropharynx is clear and moist.   Neck: No JVD present.   Cardiovascular: Normal rate and regular rhythm.   Smooth VAD sounds heard.   Pulmonary/Chest: Effort normal and breath sounds normal. No respiratory distress. He has no wheezes. He has no rales.   Abdominal: Soft. Bowel sounds are normal. He exhibits no distension. There is no tenderness.   Musculoskeletal: He  exhibits no edema.   Neurological: He is alert and oriented to person, place, and time.   Skin: Skin is warm.   Nursing note and vitals reviewed.      Significant Labs:  CBC:  Recent Labs   Lab 10/31/18  0814 10/31/18  1923 11/01/18  0803   WBC 5.52 7.36 5.53   RBC 2.83* 3.15* 3.00*   HGB 7.5* 8.6* 8.0*   HCT 24.7* 26.9* 26.5*   * 180 160   MCV 87 85 88   MCH 26.5* 27.3 26.7*   MCHC 30.4* 32.0 30.2*     BNP:  Recent Labs   Lab 10/26/18  0400 10/29/18  0501 10/31/18  0455   * 195* 308*     CMP:  Recent Labs   Lab 10/26/18  0400  10/29/18  0501 10/30/18  0722 10/31/18  0455 11/01/18  0437   GLU 87   < > 80 84 90 85   CALCIUM 8.1*   < > 7.8* 8.4* 8.3* 8.2*   ALBUMIN 2.8*  --  2.4*  --  2.4*  --    PROT 5.5*  --  4.6*  --  4.9*  --       < > 136 136 136 137   K 4.6   < > 4.1 4.2 3.9 4.2   CO2 18*   < > 22* 24 25 24   *   < > 112* 109 108 109   BUN 46*   < > 13 11 9 14   CREATININE 1.3   < > 0.8 0.8 0.8 0.8   ALKPHOS 42*  --  55  --  63  --    ALT 17  --  13  --  15  --    AST 38  --  16  --  20  --    BILITOT 0.7  --  0.6  --  0.4  --     < > = values in this interval not displayed.      Coagulation:   Recent Labs   Lab 10/30/18  0723 10/31/18  0455 11/01/18  0437   INR 1.3* 1.1 1.1   APTT 26.7 23.4 24.9     LDH:  Recent Labs   Lab 10/30/18  0723 10/31/18  0455 11/01/18  0437    146 133     Microbiology:  Microbiology Results (last 7 days)     ** No results found for the last 168 hours. **          BMP:   Recent Labs   Lab 11/01/18 0437   GLU 85      K 4.2      CO2 24   BUN 14   CREATININE 0.8   CALCIUM 8.2*   MG 2.2     Cardiac Markers: No results for input(s): CKMB, TROPONINT, MYOGLOBIN in the last 72 hours.  Coagulation:   Recent Labs   Lab 11/01/18 0437   INR 1.1   APTT 24.9     Prealbumin:   Recent Labs   Lab 10/31/18  0454   PREALBUMIN 14*     I have reviewed all pertinent labs within the past 24 hours.    Estimated Creatinine Clearance: 88.4 mL/min (based on SCr of  0.8 mg/dL).    Diagnostic Results:  I have reviewed all pertinent imaging results/findings within the past 24 hours.

## 2018-11-01 NOTE — NURSING
Wife informed that while tolieting the pt she noted an area to buttocks after assessing are noted mild excoriation to coccyx area cleansed area and applied moisture barrier

## 2018-11-01 NOTE — PROGRESS NOTES
11/01/2018  Arsh Rogers  Surgeon(s) and Role:     * Jessica Casillas MD - Primary  Current provider:  Ortega Leos MD      I, Arsh Rogers, rounded on Suman Hayden to ensure all mechanical assist device settings (IABP or VAD) were appropriate and all parameters were within limits.  I was able to ensure all back up equipment was present, the staff had no issues, and the Perfusion Department daily rounding was complete.    11:51 AM

## 2018-11-01 NOTE — PROGRESS NOTES
[facility]  Heart Transplant  Progress Note    Patient Name: Suman Hayden  MRN: [id number]  Admission Date: 10/24/2018  Hospital Length of Stay: 8 days  Attending Physician: Ortega Leos MD  Primary Care Provider: Joe Ernst MD  Principal Problem:GI bleed    Subjective:     **  Interval History: BM overnight, dark but no BRB. Feels well. No complaints.    Scheduled Meds:   amiodarone  400 mg Oral Daily    dronabinol  5 mg Oral TID    lisinopril  2.5 mg Oral Daily    mirtazapine  7.5 mg Oral Nightly    pantoprazole  40 mg Oral BID    pravastatin  20 mg Oral QHS    sodium chloride 0.9%  3 mL Intravenous Q8H    warfarin  3 mg Oral Daily     PRN Meds:sodium chloride, sodium chloride, sodium chloride, HYDROcodone-acetaminophen, polyethylene glycol    Review of patient's allergies indicates:   Allergen Reactions    Asa [aspirin] Other (See Comments)     Bleeding ulcer     Objective:     Vital Signs (Most Recent):  Temp: 98.7 °F (37.1 °C) (11/01/18 0843)  Pulse: 79 (11/01/18 1103)  Resp: 18 (11/01/18 0843)  BP: (!) 95/58 (11/01/18 0843)  SpO2: 98 % (11/01/18 0843) Vital Signs (24h Range):  Temp:  [97.6 °F (36.4 °C)-99.1 °F (37.3 °C)] 98.7 °F (37.1 °C)  Pulse:  [66-85] 79  Resp:  [18-20] 18  SpO2:  [93 %-98 %] 98 %  BP: (64-95)/(0-70) 95/58     Patient Vitals for the past 72 hrs (Last 3 readings):   Weight   11/01/18 0800 72.6 kg (160 lb 0.9 oz)   10/31/18 0700 72.9 kg (160 lb 11.5 oz)   10/30/18 0700 73.7 kg (162 lb 7.7 oz)     Body mass index is 23.64 kg/m².      Intake/Output Summary (Last 24 hours) at 11/1/2018 1126  Last data filed at 11/1/2018 0500  Gross per 24 hour   Intake 720 ml   Output 725 ml   Net -5 ml       Hemodynamic Parameters:     Telemetry: No acute events.    Physical Exam   Constitutional: He is oriented to person, place, and time. He appears well-developed and well-nourished.   HENT:   Mouth/Throat: Oropharynx is clear and moist.   Neck: No JVD present.    Cardiovascular: Normal rate and regular rhythm.   Smooth VAD sounds heard.   Pulmonary/Chest: Effort normal and breath sounds normal. No respiratory distress. He has no wheezes. He has no rales.   Abdominal: Soft. Bowel sounds are normal. He exhibits no distension. There is no tenderness.   Musculoskeletal: He exhibits no edema.   Neurological: He is alert and oriented to person, place, and time.   Skin: Skin is warm.   Nursing note and vitals reviewed.      Significant Labs:  CBC:  Recent Labs   Lab 10/31/18  0814 10/31/18  1923 11/01/18  0803   WBC 5.52 7.36 5.53   RBC 2.83* 3.15* 3.00*   HGB 7.5* 8.6* 8.0*   HCT 24.7* 26.9* 26.5*   * 180 160   MCV 87 85 88   MCH 26.5* 27.3 26.7*   MCHC 30.4* 32.0 30.2*     BNP:  Recent Labs   Lab 10/26/18  0400 10/29/18  0501 10/31/18  0455   * 195* 308*     CMP:  Recent Labs   Lab 10/26/18  0400  10/29/18  0501 10/30/18  0722 10/31/18  0455 11/01/18  0437   GLU 87   < > 80 84 90 85   CALCIUM 8.1*   < > 7.8* 8.4* 8.3* 8.2*   ALBUMIN 2.8*  --  2.4*  --  2.4*  --    PROT 5.5*  --  4.6*  --  4.9*  --       < > 136 136 136 137   K 4.6   < > 4.1 4.2 3.9 4.2   CO2 18*   < > 22* 24 25 24   *   < > 112* 109 108 109   BUN 46*   < > 13 11 9 14   CREATININE 1.3   < > 0.8 0.8 0.8 0.8   ALKPHOS 42*  --  55  --  63  --    ALT 17  --  13  --  15  --    AST 38  --  16  --  20  --    BILITOT 0.7  --  0.6  --  0.4  --     < > = values in this interval not displayed.      Coagulation:   Recent Labs   Lab 10/30/18  0723 10/31/18  0455 11/01/18  0437   INR 1.3* 1.1 1.1   APTT 26.7 23.4 24.9     LDH:  Recent Labs   Lab 10/30/18  0723 10/31/18  0455 11/01/18 0437    146 133     Microbiology:  Microbiology Results (last 7 days)     ** No results found for the last 168 hours. **          BMP:   Recent Labs   Lab 11/01/18 0437   GLU 85      K 4.2      CO2 24   BUN 14   CREATININE 0.8   CALCIUM 8.2*   MG 2.2     Cardiac Markers: No results for input(s):  CKMB, TROPONINT, MYOGLOBIN in the last 72 hours.  Coagulation:   Recent Labs   Lab 11/01/18  0437   INR 1.1   APTT 24.9     Prealbumin:   Recent Labs   Lab 10/31/18  0455   PREALBUMIN 14*     I have reviewed all pertinent labs within the past 24 hours.    Estimated Creatinine Clearance: 88.4 mL/min (based on SCr of 0.8 mg/dL).    Diagnostic Results:  I have reviewed all pertinent imaging results/findings within the past 24 hours.    Assessment and Plan:     Mr. Hayden is a 68 yoAAM, admitted to [facility] for symptomatic anemia in the setting of acute blood los anemia due to GI bleed. PMHx of NICM, S/P Heartmate 3 as DT therapy 7/27/17, ICD revision 11/20/17 with Dr. Vidal (DC ICD placed with active RA/LV leads (RV lead capped during revision) that was complicated by pocket hematoma, GI bleed, VT on Amiodarone. Patient is a transfer from [place] ER for acute blood loss anemia. HPI gathered  from spouse at bedside, state patient developed generalized weakness, worsening fatigability and pale appearance past 1 week. He presented to ER with c/o R sided chest pain, which patient describes as sharp shooting in nature, intermittent, 6/10, started. Further work up in ED revealed HBG ~ 4, baseline Hbg 9. Denies any GI bleed, no dark tarry stool, no melena, hematochezia, no hematuria, hemoptysis. Patient is on oral Iron but reports brown stools. Currently denies any chest pain, SOB, N/V/D. No VAD alarms.     Enroute given 1u pRBC. Repeat Hbg 5.3, INR 2.3 -> 4.4.           * GI bleed    - Appreciate GI's help. GI Push enteroscopy negative for source   - Now w/ patency capsule in LUQ on abdominal x-ray   - Consider DBE if he rebleeds per GI but will hold off for now   - INR 1.1, will continue coumadin  - Off ASA 2/2 h/o GI bleeds since 2017, had h/o ileal resection.  Had EGD/Colonoscopy 8/2018 - mucosal nodule in the duodenum and 2 mm polyp in prox transverse colon, diverticulosis.  - No bridge due to GI bleed.     LVAD (left  ventricular assist device) present    - NICMP s/p HM3 7/27/2017  - No LFA recently on interrogation   - ASA stopped on 12/22/17 after GI bleed and ileal ulcer    - INR 1.1 today. Goal 2-3. No bridge due to bleeds. Keep < 1.7 for GI procedures   - continue coumadin  - LDH stable  - TTE 10/25 at 5200 rpm before getting 4 units PC's: LVEDD 3.7, AV does not open, septum midline.LVEF 10-15%, E/e' 22, RVSF- normal.  Plan to repeat as an outpatient.  - Plan to present him this week for Heart transplant.       Procedure: Device Interrogation Including analysis of device parameters  Current Settings: Ventricular Assist Device  Review of device function shows few PI events    TXP LVAD INTERROGATIONS 11/1/2018 11/1/2018 10/31/2018 10/31/2018 10/31/2018 10/31/2018 10/31/2018   Type HeartMate3 HeartMate3 HeartMate3 HeartMate3 HeartMate3 HeartMate3 HeartMate3   Flow 4.0 4.0 4.2 4.2 3.8 4.6 4.6   Speed 5200 5200 5200 5200 5200 5200 5200   PI 3.4 3.4 3.5 4.0 3.5 2.6 2.6   Power ([place]) 3.5 3.5 3.5 3.5 3.5 3.5 3.5   LSL - - - 4800 4800 4800 4800   Pulsatility - - No Pulse No Pulse No Pulse No Pulse Pulse        INDIRA (acute kidney injury)    - INDIRA (Cr 1.9 on admit, down to 0.8 today)   - Likely in the setting of hypoperfusion   - Back to baseline and lisinopril restarted at low dose       Anemia    -Symptomatic anemia, Acute blood loss anemia, unclear etiology, presumed GI bleed (prior hx, however no recent melena), doubt hemolysis (LD mildly elevated, Bili wnl):  - Hbg 4.2 on admit. S/p tranfusion  - INR 4.4 on admit.   - S/P 4 U PRBCs this admission  - H & H stable today 8.6/26.9  - Monitor H/H closely.     VT (ventricular tachycardia)    - Continue PTA Amiodarone  - Caution previously developed VT with anemia  - Cannot torelate Mexitil  - Had ICD interrogated with no acute events 10/2018.  - EP saw during last admission  Discharged on 10/17 with Hb -9.2) due to hypotension, dizziness, low flows- held mexiletine which was started  9/2018, though unlikely cause of symptoms. Rec compression stockings. Ablation at a later date. Had drop in Hb from 12.5 on 10/4 to 9.2  on 10/17.      Essential hypertension    - Resume lisinopril 2.5mg BID   - Has labile BP, goal 60-90, stable overnight       Nonischemic cardiomyopathy    S/p LVAD    - LVEDD 6.2 / LVef 25%  - see VAD plan          Isabel Chen PA-C  Heart Transplant  [facility]

## 2018-11-01 NOTE — ASSESSMENT & PLAN NOTE
- Appreciate GI's help. GI Push enteroscopy negative for source   - Now w/ patency capsule in LUQ on abdominal x-ray   - Consider DBE if he rebleeds per GI but will hold off for now   - INR 1.1, will continue coumadin  - Off ASA 2/2 h/o GI bleeds since 2017, had h/o ileal resection.  Had EGD/Colonoscopy 8/2018 - mucosal nodule in the duodenum and 2 mm polyp in prox transverse colon, diverticulosis.  - No bridge due to GI bleed.

## 2018-11-01 NOTE — ASSESSMENT & PLAN NOTE
- Continue PTA Amiodarone  - Caution previously developed VT with anemia  - Cannot torelate Mexitil  - Had ICD interrogated with no acute events 10/2018.  - EP saw during last admission  Discharged on 10/17 with Hb -9.2) due to hypotension, dizziness, low flows- held mexiletine which was started 9/2018, though unlikely cause of symptoms. Rec compression stockings. Ablation at a later date. Had drop in Hb from 12.5 on 10/4 to 9.2  on 10/17.

## 2018-11-01 NOTE — TREATMENT PLAN
Discussed with primary team.     Patient no longer has melena. H/H has been stable.   We will hold on further investigations for GI bleeding at this time.  Please contact us if the patient is showing signs of recurrent GI bleeding.

## 2018-11-02 NOTE — PROGRESS NOTES
11/02/2018  Liudmila Cook  Surgeon(s) and Role:     * Jessica Casillas MD - Primary  Current provider:  Ortega Leos MD      I, Liudmila Cook, rounded on Suman Hayden to ensure all mechanical assist device settings (IABP or VAD) were appropriate and all parameters were within limits.  I was able to ensure all back up equipment was present, the staff had no issues, and the Perfusion Department daily rounding was complete.    8:57 AM

## 2018-11-02 NOTE — PROGRESS NOTES
DISCHARGE NOTE:    Suman Hayden is a 68 y.o. male s/p HM3 7/2017 who was admitted for anemia and suspicion for GIB. Post implant hx includes GI ulcer requiring surgery, as well as recurrent GIB, and VT.       Hospital Course: Patient was transfused and discharged once stable. No source of bleeding was found.     Pharmacy Interventions/Recommendations:  1) INR Goal: Reduced to 2-2.5    2) Antiplatelet Agents: none    3) Heparin Bridging: none    4) Patient Counseling/Education:  Medications were reviewed with the patient.     5) INR Follow-Up/Discharge Needs:  3mg tonight, followed by 2mg/day. Recheck INR Monday closely, as patient's INR is labile.     See list of discharge medication for dosing instructions.     Suman Hayden and his caregiver verbalized their understanding and had the opportunity to ask questions.      Discharge Medications:   Suman Hayden   Home Medication Instructions MYRANDA:[id number]    Printed on:11/02/18 7304   Medication Information                      amiodarone (PACERONE) 200 MG Tab  Take 2 tablets (400 mg total) by mouth once daily.             dronabinol (MARINOL) 5 MG capsule  TAKE ONE CAPSULE BY MOUTH 3 TIMES DAILY BEFORE MEALS             ferrous sulfate 324 mg (65 mg iron) TbEC  Take 1 tablet (325 mg total) by mouth 3 (three) times daily.             HYDROcodone-acetaminophen (NORCO) 5-325 mg per tablet  Take 1 tablet by mouth every 6 (six) hours as needed for Pain.             lisinopril (PRINIVIL,ZESTRIL) 2.5 MG tablet  Take 1 tablet (2.5 mg total) by mouth once daily.             mirtazapine (REMERON) 7.5 MG Tab  Take 1 tablet (7.5 mg total) by mouth nightly.             pantoprazole (PROTONIX) 40 MG tablet  Take 1 tablet (40 mg total) by mouth 2 (two) times daily.             polyethylene glycol (GLYCOLAX) 17 gram PwPk  Take 17 g by mouth daily as needed (constipation).             pravastatin (PRAVACHOL) 20 MG tablet  Take 1 tablet (20 mg total) by mouth every  evening.             spironolactone (ALDACTONE) 25 MG tablet  Take 1 tablet (25 mg total) by mouth once daily.             warfarin (COUMADIN) 2 MG tablet  Take 3mg (1 & 1/2) tabs on 11/2 only, followed by 2mg (1 tab) orally daily thereafter.

## 2018-11-02 NOTE — ASSESSMENT & PLAN NOTE
-Symptomatic anemia, Acute blood loss anemia, unclear etiology, presumed GI bleed (prior hx, however no recent melena), doubt hemolysis (LD mildly elevated, Bili wnl):  - Hbg 4.2 on admit. S/p tranfusion  - INR 4.4 on admit.   - S/P 4 U PRBCs this admission  - H & H stable today 8.4/26  - Monitor H/H closely.  - d/c home on Iron and check CBC/ BMP in one week with clinic follow up in ~2 weeks

## 2018-11-02 NOTE — DISCHARGE SUMMARY
[facility]  Heart Transplant  Discharge Summary      Patient Name: Suman Hayden  MRN: [id number]  Admission Date: 10/24/2018  Hospital Length of Stay: 9 days  Discharge Date and Time: 11/02/2018 12:10 PM  Attending Physician: Ortega Leos MD   Discharging Provider: Isabel Chen PA-C  Primary Care Provider: Joe Ernst MD     HPI:  Mr. Hayden is a 68 yoAAM, admitted to [facility] for symptomatic anemia in the setting of acute blood los anemia due to GI bleed. PMHx of NICM, S/P Heartmate 3 as DT therapy 7/27/17, ICD revision 11/20/17 with Dr. Vidal (DC ICD placed with active RA/LV leads (RV lead capped during revision) that was complicated by pocket hematoma, GI bleed, VT on Amiodarone. Patient is a transfer from [facility] for acute blood loss anemia. HPI gathered  from spouse at bedside, state patient developed generalized weakness, worsening fatigability and pale appearance past 1 week. He presented to ER with c/o R sided chest pain, which patient describes as sharp shooting in nature, intermittent, 6/10, started. Further work up in ED revealed HBG ~ 4, baseline Hbg 9. Denies any GI bleed, no dark tarry stool, no melena, hematochezia, no hematuria, hemoptysis. Patient is on oral Iron but reports brown stools. Currently denies any chest pain, SOB, N/V/D. No VAD alarms.   Enroute given 1u pRBC. Repeat Hbg 5.3, INR 2.3 -> 4.4.      Procedure(s) (LRB):  EGD (ESOPHAGOGASTRODUODENOSCOPY) (N/A)     Hospital Course:  Patient was admitted due to GI bleed. GI was consulted. INDIRA on admit likely due to hypoperfusion resolved with blood transfusion. ACE was held on admission as well and was restarted when renal function returned to baseline. Push enteroscopy was completed on 10/26 that was negative for source. Patency capsule was next placed due to history of ileal resection (prior to VCE) to ensure that the gut is open/unobstructed and moves normally. On repeat KUB capsule was noted in  LUQ. Per GI there is no reason to attempt VCE due to patency capsule retained in LUQ. At that time H & H remained stable. He was transfused a total of 4 U PRBCs while inpatient. He remains a NO heparin bridge/off of ASA. 3mg tonight, followed by 2mg/day. Recheck INR Monday closely, as patient's INR is labile. He will have labs on Monday to check BMP and CBC. He will follow up with HTS in ~2 weeks.   Of note, Presented at selection this admission. Unable to determine transplant candidacy at this time due to need for fundraising to cover copays. Will plan to reassess at the end of the year.  SW will continue to work with the pt and wife on this.     Consults (From admission, onward)        Status Ordering Provider     Inpatient consult to Gastroenterology  Once     Provider:  (Not yet assigned)    Completed CHAO DAVALOS     Inpatient consult to PICC team (NIAS)  Once     Provider:  (Not yet assigned)    Completed HARSHAL SUTTON     Inpatient consult to Registered Dietitian/Nutritionist  Once     Provider:  (Not yet assigned)    Completed CHAO DAVALOS        Significant Diagnostic Studies: Labs:   CMP   Recent Labs   Lab 11/01/18  0437 11/02/18  0402    137   K 4.2 4.2    109   CO2 24 24   GLU 85 82   BUN 14 13   CREATININE 0.8 0.9   CALCIUM 8.2* 8.5*   PROT  --  4.9*   ALBUMIN  --  2.4*   BILITOT  --  0.3   ALKPHOS  --  59   AST  --  15   ALT  --  13   ANIONGAP 4* 4*   ESTGFRAFRICA >60.0 >60.0   EGFRNONAA >60.0 >60.0   , CBC   Recent Labs   Lab 11/01/18  0803 11/01/18  2040 11/02/18  0733   WBC 5.53 5.90 8.02   HGB 8.0* 7.8* 8.4*   HCT 26.5* 25.7* 26.3*    169 156    and INR   Lab Results   Component Value Date    INR 1.1 11/02/2018    INR 1.1 11/01/2018    INR 1.1 10/31/2018     Cardiac Graphics: Echocardiogram:   2D echo with color flow doppler:   Results for orders placed or performed during the hospital encounter of 10/24/18   2D echo with color flow doppler   Result  Value Ref Range    Calculated EF 10 (A) 55 - 65       Pending Diagnostic Studies:     Procedure Component Value Units Date/Time    H. pylori antigen, stool [[id number]] Collected:  11/01/18 1137    Order Status:  Sent Lab Status:  In process Updated:  11/01/18 1841    Specimen:  Stool     Occult Blood Stool, CA Screen [[id number]] Collected:  11/01/18 1651    Order Status:  Sent Lab Status:  In process Updated:  11/01/18 1652    Specimen:  Stool         Final Active Diagnoses:    Diagnosis Date Noted POA    PRINCIPAL PROBLEM:  GI bleed [K92.2] 03/14/2018 Yes    LVAD (left ventricular assist device) present [Z95.811] 07/29/2017 Not Applicable    INDIRA (acute kidney injury) [N17.9] 10/25/2018 Yes    Iron deficiency anemia due to chronic blood loss [D50.0] 10/15/2018 Yes    Anticoagulation monitoring, INR range 2-3 [Z79.01]  Not Applicable    Pure hypercholesterolemia [E78.00] 02/19/2018 Yes    Anemia [D64.9] 12/08/2017 Yes    Chronic combined systolic and diastolic congestive heart failure [I50.42] 07/07/2017 Yes    VT (ventricular tachycardia) [I47.2] 07/05/2017 Yes    Essential hypertension [I10] 07/05/2017 Yes    Nonischemic cardiomyopathy [I42.8] 06/25/2017 Yes      Problems Resolved During this Admission:      Discharged Condition: stable    Disposition: Home or Self Care    Follow Up: Labs on Monday, HTS LVAD clinic follow up in ~2 weeks    Patient Instructions:      Diet Cardiac     Notify your health care provider if you experience any of the following:  temperature >100.4     Notify your health care provider if you experience any of the following:  persistent nausea and vomiting or diarrhea     Notify your health care provider if you experience any of the following:  severe uncontrolled pain     Notify your health care provider if you experience any of the following:  severe persistent headache     Notify your health care provider if you experience any of the following:  difficulty breathing or  increased cough     Notify your health care provider if you experience any of the following:  redness, tenderness, or signs of infection (pain, swelling, redness, odor or green/yellow discharge around incision site)     Notify your health care provider if you experience any of the following:  persistent dizziness, light-headedness, or visual disturbances     Notify your health care provider if you experience any of the following:  worsening rash     Notify your health care provider if you experience any of the following:  increased confusion or weakness     Activity as tolerated     Medications:  Reconciled Home Medications:      Medication List      CHANGE how you take these medications    ferrous sulfate 324 mg (65 mg iron) Tbec  Take 1 tablet (325 mg total) by mouth 3 (three) times daily.  What changed:    · medication strength  · how much to take     pantoprazole 40 MG tablet  Commonly known as:  PROTONIX  Take 1 tablet (40 mg total) by mouth 2 (two) times daily.  What changed:  when to take this     warfarin 2 MG tablet  Commonly known as:  COUMADIN  Take 3mg (1 & 1/2) tabs on 11/2 only, followed by 2mg (1 tab) orally daily thereafter.  What changed:  additional instructions        CONTINUE taking these medications    amiodarone 200 MG Tab  Commonly known as:  PACERONE  Take 2 tablets (400 mg total) by mouth once daily.     dronabinol 5 MG capsule  Commonly known as:  MARINOL  TAKE ONE CAPSULE BY MOUTH 3 TIMES DAILY BEFORE MEALS     HYDROcodone-acetaminophen 5-325 mg per tablet  Commonly known as:  NORCO  Take 1 tablet by mouth every 6 (six) hours as needed for Pain.     lisinopril 2.5 MG tablet  Commonly known as:  PRINIVIL,ZESTRIL  Take 1 tablet (2.5 mg total) by mouth once daily.     mirtazapine 7.5 MG Tab  Commonly known as:  REMERON  Take 1 tablet (7.5 mg total) by mouth nightly.     polyethylene glycol 17 gram Pwpk  Commonly known as:  GLYCOLAX  Take 17 g by mouth daily as needed (constipation).      pravastatin 20 MG tablet  Commonly known as:  PRAVACHOL  Take 1 tablet (20 mg total) by mouth every evening.     spironolactone 25 MG tablet  Commonly known as:  ALDACTONE  Take 1 tablet (25 mg total) by mouth once daily.        STOP taking these medications    magnesium oxide 400 mg (241.3 mg magnesium) tablet  Commonly known as:  MAG-OX            Isabel Chen PA-C  Heart Transplant  [facility]

## 2018-11-02 NOTE — PROGRESS NOTES
[facility]  Heart Transplant  Progress Note    Patient Name: Suman Hayden  MRN: [id number]  Admission Date: 10/24/2018  Hospital Length of Stay: 9 days  Attending Physician: Ortega Leos MD  Primary Care Provider: Joe Ernst MD  Principal Problem:GI bleed    Subjective:     Interval History: No complaints. Another dark BM but H & H remains stable.    Scheduled Meds:   amiodarone  400 mg Oral Daily    dronabinol  5 mg Oral TID    lisinopril  2.5 mg Oral Daily    mirtazapine  7.5 mg Oral Nightly    pantoprazole  40 mg Oral BID    pravastatin  20 mg Oral QHS    sodium chloride 0.9%  3 mL Intravenous Q8H    warfarin  3 mg Oral Daily     PRN Meds:HYDROcodone-acetaminophen, polyethylene glycol    Review of patient's allergies indicates:   Allergen Reactions    Asa [aspirin] Other (See Comments)     Bleeding ulcer     Objective:     Vital Signs (Most Recent):  Temp: 98 °F (36.7 °C) (11/02/18 0747)  Pulse: 80 (11/02/18 1104)  Resp: 18 (11/02/18 0747)  BP: 106/72 (11/02/18 0747)  SpO2: 99 % (11/02/18 0747) Vital Signs (24h Range):  Temp:  [96.1 °F (35.6 °C)-98.2 °F (36.8 °C)] 98 °F (36.7 °C)  Pulse:  [] 80  Resp:  [16-18] 18  SpO2:  [94 %-99 %] 99 %  BP: ()/(0-72) 106/72     Patient Vitals for the past 72 hrs (Last 3 readings):   Weight   11/02/18 0700 73.7 kg (162 lb 7.7 oz)   11/01/18 0800 72.6 kg (160 lb 0.9 oz)   10/31/18 0700 72.9 kg (160 lb 11.5 oz)     Body mass index is 23.99 kg/m².      Intake/Output Summary (Last 24 hours) at 11/2/2018 1121  Last data filed at 11/2/2018 0600  Gross per 24 hour   Intake 1200 ml   Output 1176 ml   Net 24 ml       Hemodynamic Parameters:     Telemetry: No acute events.    Physical Exam   Constitutional: He is oriented to person, place, and time. He appears well-developed and well-nourished.   HENT:   Mouth/Throat: Oropharynx is clear and moist.   Neck: No JVD present.   Cardiovascular: Normal rate and regular rhythm.   Smooth VAD  sounds heard.   Pulmonary/Chest: Effort normal and breath sounds normal. No respiratory distress. He has no wheezes. He has no rales.   Abdominal: Soft. Bowel sounds are normal. He exhibits no distension. There is no tenderness.   Musculoskeletal: He exhibits no edema.   Neurological: He is alert and oriented to person, place, and time.   Skin: Skin is warm.   Nursing note and vitals reviewed.      Significant Labs:  CBC:  Recent Labs   Lab 11/01/18  0803 11/01/18 2040 11/02/18  0733   WBC 5.53 5.90 8.02   RBC 3.00* 2.97* 3.20*   HGB 8.0* 7.8* 8.4*   HCT 26.5* 25.7* 26.3*    169 156   MCV 88 87 82   MCH 26.7* 26.3* 26.3*   MCHC 30.2* 30.4* 31.9*     BNP:  Recent Labs   Lab 10/29/18  0501 10/31/18  0455 11/02/18  0402   * 308* 310*     CMP:  Recent Labs   Lab 10/29/18  0501  10/31/18  0455 11/01/18  0437 11/02/18  0402   GLU 80   < > 90 85 82   CALCIUM 7.8*   < > 8.3* 8.2* 8.5*   ALBUMIN 2.4*  --  2.4*  --  2.4*   PROT 4.6*  --  4.9*  --  4.9*      < > 136 137 137   K 4.1   < > 3.9 4.2 4.2   CO2 22*   < > 25 24 24   *   < > 108 109 109   BUN 13   < > 9 14 13   CREATININE 0.8   < > 0.8 0.8 0.9   ALKPHOS 55  --  63  --  59   ALT 13  --  15  --  13   AST 16  --  20  --  15   BILITOT 0.6  --  0.4  --  0.3    < > = values in this interval not displayed.      Coagulation:   Recent Labs   Lab 10/31/18  0455 11/01/18  0437 11/02/18  0402   INR 1.1 1.1 1.1   APTT 23.4 24.9 23.9     LDH:  Recent Labs   Lab 10/31/18  0455 11/01/18  0437 11/02/18  0402    133 143     Microbiology:  Microbiology Results (last 7 days)     ** No results found for the last 168 hours. **          BMP:   Recent Labs   Lab 11/02/18 0402   GLU 82      K 4.2      CO2 24   BUN 13   CREATININE 0.9   CALCIUM 8.5*   MG 2.2     Cardiac Markers: No results for input(s): CKMB, TROPONINT, MYOGLOBIN in the last 72 hours.  Coagulation:   Recent Labs   Lab 11/02/18 0402   INR 1.1   APTT 23.9     Prealbumin:   Recent  Labs   Lab 11/02/18  0402   PREALBUMIN 16*     I have reviewed all pertinent labs within the past 24 hours.    Estimated Creatinine Clearance: 78.6 mL/min (based on SCr of 0.9 mg/dL).    Diagnostic Results:  I have reviewed all pertinent imaging results/findings within the past 24 hours.    Assessment and Plan:     Mr. Hayden is a 68 yoAAM, admitted to [facility] for symptomatic anemia in the setting of acute blood los anemia due to GI bleed. PMHx of NICM, S/P Heartmate 3 as DT therapy 7/27/17, ICD revision 11/20/17 with Dr. Vidal (DC ICD placed with active RA/LV leads (RV lead capped during revision) that was complicated by pocket hematoma, GI bleed, VT on Amiodarone. Patient is a transfer from [place] [facility] for acute blood loss anemia. HPI gathered  from spouse at bedside, state patient developed generalized weakness, worsening fatigability and pale appearance past 1 week. He presented to ER with c/o R sided chest pain, which patient describes as sharp shooting in nature, intermittent, 6/10, started. Further work up in ED revealed HBG ~ 4, baseline Hbg 9. Denies any GI bleed, no dark tarry stool, no melena, hematochezia, no hematuria, hemoptysis. Patient is on oral Iron but reports brown stools. Currently denies any chest pain, SOB, N/V/D. No VAD alarms.     Enroute given 1u pRBC. Repeat Hbg 5.3, INR 2.3 -> 4.4.             * GI bleed    - Appreciate GI's help. GI Push enteroscopy negative for source   - Now w/ patency capsule in LUQ on abdominal x-ray   - Gi signed off as no further bleeding, H & H stable  - INR 1.1, will continue coumadin  - Off ASA 2/2 h/o GI bleeds since 2017, had h/o ileal resection.  Had EGD/Colonoscopy 8/2018 - mucosal nodule in the duodenum and 2 mm polyp in prox transverse colon, diverticulosis.  - No bridge due to GI bleed.     LVAD (left ventricular assist device) present    - NICMP s/p HM3 7/27/2017  - No LFA recently on interrogation   - ASA stopped on 12/22/17 after GI bleed and ileal  ulcer    - INR 1.1 today. Goal 2-3. No heparin bridge due to bleeds.    - continue coumadin  - LDH stable  - TTE 10/25 at 5200 rpm before getting 4 units PC's: LVEDD 3.7, AV does not open, septum midline.LVEF 10-15%, E/e' 22, RVSF- normal.  Plan to repeat as an outpatient.  - Presented at selection this admission. Unable to determine transplant candidacy at this time due to need for fundraising to cover copays. Will plan to reassess at the end of the year.  SW will continue to work with the pt and wife on this.     Procedure: Device Interrogation Including analysis of device parameters  Current Settings: Ventricular Assist Device  Review of device function shows few PI events    TXP LVAD INTERROGATIONS 11/2/2018 11/2/2018 11/1/2018 11/1/2018 11/1/2018 11/1/2018 11/1/2018   Type HeartMate3 HeartMate3 HeartMate3 HeartMate3 HeartMate3 HeartMate3 HeartMate3   Flow 3.8 4.1 4.2 4.2 3.9 3.9 4.0   Speed 5200 5200 5200 5200 5200 5200 5200   PI 3.7 3.3 3.1 3.7 3.6 3.9 3.4   Power ([place]) 3.6 3.5 3.6 3.5 3.6 3.5 3.5   LSL - - - 4800 4800 4800 -   Pulsatility - - - No Pulse No Pulse Pulse -        INDIRA (acute kidney injury)    - INDIRA (Cr 1.9 on admit, down to 0.8 today)   - Likely in the setting of hypoperfusion   - Back to baseline and lisinopril restarted at low dose       Anemia    -Symptomatic anemia, Acute blood loss anemia, unclear etiology, presumed GI bleed (prior hx, however no recent melena), doubt hemolysis (LD mildly elevated, Bili wnl):  - Hbg 4.2 on admit. S/p tranfusion  - INR 4.4 on admit.   - S/P 4 U PRBCs this admission  - H & H stable today 8.4/26  - Monitor H/H closely.  - d/c home on Iron and check CBC/ BMP in one week with clinic follow up in ~2 weeks     VT (ventricular tachycardia)    - Continue PTA Amiodarone  - Caution previously developed VT with anemia  - Cannot torelate Mexitil  - Had ICD interrogated with no acute events 10/2018.  - EP saw during last admission  Discharged on 10/17 with Hb -9.2) due  to hypotension, dizziness, low flows- held mexiletine which was started 9/2018, though unlikely cause of symptoms. Rec compression stockings. Ablation at a later date. Had drop in Hb from 12.5 on 10/4 to 9.2  on 10/17.      Essential hypertension    - Resume lisinopril 2.5mg BID   - Has labile BP, goal 60-90, stable overnight       Nonischemic cardiomyopathy    S/p LVAD    - LVEDD 6.2 / LVef 25%  - see VAD plan          Isabel Chen PA-C  Heart Transplant  [facility]

## 2018-11-02 NOTE — ASSESSMENT & PLAN NOTE
- Appreciate GI's help. GI Push enteroscopy negative for source   - Now w/ patency capsule in LUQ on abdominal x-ray   - Gi signed off as no further bleeding, H & H stable  - INR 1.1, will continue coumadin  - Off ASA 2/2 h/o GI bleeds since 2017, had h/o ileal resection.  Had EGD/Colonoscopy 8/2018 - mucosal nodule in the duodenum and 2 mm polyp in prox transverse colon, diverticulosis.  - No bridge due to GI bleed.

## 2018-11-02 NOTE — PLAN OF CARE
Problem: Patient Care Overview  Goal: Plan of Care Review  Outcome: Ongoing (interventions implemented as appropriate)  Patient free from falls and injuries throughout shift.  AAO and VSS.  LVAD working WNL;  dressing due 11/2/2018.  Patient continues on amiodarone 400 mg PO and pantoprazole 40 mg BID.  LVEF 10-15%.  H/H 7.8/25.7. Patient coumadin increased to 3 mg PO daily; INR 1.1.  No acute events overnight. Patient resting well at this time.  Plan of care discussed with patient.  Patient verbalizes understanding.  Will continue to monitor.

## 2018-11-02 NOTE — SUBJECTIVE & OBJECTIVE
Interval History: No complaints. Another dark BM but H & H remains stable.    Scheduled Meds:   amiodarone  400 mg Oral Daily    dronabinol  5 mg Oral TID    lisinopril  2.5 mg Oral Daily    mirtazapine  7.5 mg Oral Nightly    pantoprazole  40 mg Oral BID    pravastatin  20 mg Oral QHS    sodium chloride 0.9%  3 mL Intravenous Q8H    warfarin  3 mg Oral Daily     PRN Meds:HYDROcodone-acetaminophen, polyethylene glycol    Review of patient's allergies indicates:   Allergen Reactions    Asa [aspirin] Other (See Comments)     Bleeding ulcer     Objective:     Vital Signs (Most Recent):  Temp: 98 °F (36.7 °C) (11/02/18 0747)  Pulse: 80 (11/02/18 1104)  Resp: 18 (11/02/18 0747)  BP: 106/72 (11/02/18 0747)  SpO2: 99 % (11/02/18 0747) Vital Signs (24h Range):  Temp:  [96.1 °F (35.6 °C)-98.2 °F (36.8 °C)] 98 °F (36.7 °C)  Pulse:  [] 80  Resp:  [16-18] 18  SpO2:  [94 %-99 %] 99 %  BP: ()/(0-72) 106/72     Patient Vitals for the past 72 hrs (Last 3 readings):   Weight   11/02/18 0700 73.7 kg (162 lb 7.7 oz)   11/01/18 0800 72.6 kg (160 lb 0.9 oz)   10/31/18 0700 72.9 kg (160 lb 11.5 oz)     Body mass index is 23.99 kg/m².      Intake/Output Summary (Last 24 hours) at 11/2/2018 1121  Last data filed at 11/2/2018 0600  Gross per 24 hour   Intake 1200 ml   Output 1176 ml   Net 24 ml       Hemodynamic Parameters:     Telemetry: No acute events.    Physical Exam   Constitutional: He is oriented to person, place, and time. He appears well-developed and well-nourished.   HENT:   Mouth/Throat: Oropharynx is clear and moist.   Neck: No JVD present.   Cardiovascular: Normal rate and regular rhythm.   Smooth VAD sounds heard.   Pulmonary/Chest: Effort normal and breath sounds normal. No respiratory distress. He has no wheezes. He has no rales.   Abdominal: Soft. Bowel sounds are normal. He exhibits no distension. There is no tenderness.   Musculoskeletal: He exhibits no edema.   Neurological: He is alert and  oriented to person, place, and time.   Skin: Skin is warm.   Nursing note and vitals reviewed.      Significant Labs:  CBC:  Recent Labs   Lab 11/01/18  0803 11/01/18 2040 11/02/18  0733   WBC 5.53 5.90 8.02   RBC 3.00* 2.97* 3.20*   HGB 8.0* 7.8* 8.4*   HCT 26.5* 25.7* 26.3*    169 156   MCV 88 87 82   MCH 26.7* 26.3* 26.3*   MCHC 30.2* 30.4* 31.9*     BNP:  Recent Labs   Lab 10/29/18  0501 10/31/18  0455 11/02/18  0402   * 308* 310*     CMP:  Recent Labs   Lab 10/29/18  0501  10/31/18  0455 11/01/18  0437 11/02/18  0402   GLU 80   < > 90 85 82   CALCIUM 7.8*   < > 8.3* 8.2* 8.5*   ALBUMIN 2.4*  --  2.4*  --  2.4*   PROT 4.6*  --  4.9*  --  4.9*      < > 136 137 137   K 4.1   < > 3.9 4.2 4.2   CO2 22*   < > 25 24 24   *   < > 108 109 109   BUN 13   < > 9 14 13   CREATININE 0.8   < > 0.8 0.8 0.9   ALKPHOS 55  --  63  --  59   ALT 13  --  15  --  13   AST 16  --  20  --  15   BILITOT 0.6  --  0.4  --  0.3    < > = values in this interval not displayed.      Coagulation:   Recent Labs   Lab 10/31/18  0455 11/01/18  0437 11/02/18  0402   INR 1.1 1.1 1.1   APTT 23.4 24.9 23.9     LDH:  Recent Labs   Lab 10/31/18  0455 11/01/18  0437 11/02/18  0402    133 143     Microbiology:  Microbiology Results (last 7 days)     ** No results found for the last 168 hours. **          BMP:   Recent Labs   Lab 11/02/18  0402   GLU 82      K 4.2      CO2 24   BUN 13   CREATININE 0.9   CALCIUM 8.5*   MG 2.2     Cardiac Markers: No results for input(s): CKMB, TROPONINT, MYOGLOBIN in the last 72 hours.  Coagulation:   Recent Labs   Lab 11/02/18 0402   INR 1.1   APTT 23.9     Prealbumin:   Recent Labs   Lab 11/02/18 0402   PREALBUMIN 16*     I have reviewed all pertinent labs within the past 24 hours.    Estimated Creatinine Clearance: 78.6 mL/min (based on SCr of 0.9 mg/dL).    Diagnostic Results:  I have reviewed all pertinent imaging results/findings within the past 24 hours.

## 2018-11-02 NOTE — ASSESSMENT & PLAN NOTE
- NICMP s/p HM3 7/27/2017  - No LFA recently on interrogation   - ASA stopped on 12/22/17 after GI bleed and ileal ulcer    - INR 1.1 today. Goal 2-3. No heparin bridge due to bleeds.    - continue coumadin  - LDH stable  - TTE 10/25 at 5200 rpm before getting 4 units PC's: LVEDD 3.7, AV does not open, septum midline.LVEF 10-15%, E/e' 22, RVSF- normal.  Plan to repeat as an outpatient.  - Presented at selection this admission. Unable to determine transplant candidacy at this time due to need for fundraising to cover copays. Will plan to reassess at the end of the year.  SW will continue to work with the pt and wife on this.     Procedure: Device Interrogation Including analysis of device parameters  Current Settings: Ventricular Assist Device  Review of device function shows few PI events    TXP LVAD INTERROGATIONS 11/2/2018 11/2/2018 11/1/2018 11/1/2018 11/1/2018 11/1/2018 11/1/2018   Type HeartMate3 HeartMate3 HeartMate3 HeartMate3 HeartMate3 HeartMate3 HeartMate3   Flow 3.8 4.1 4.2 4.2 3.9 3.9 4.0   Speed 5200 5200 5200 5200 5200 5200 5200   PI 3.7 3.3 3.1 3.7 3.6 3.9 3.4   Power ([place]) 3.6 3.5 3.6 3.5 3.6 3.5 3.5   LSL - - - 4800 4800 4800 -   Pulsatility - - - No Pulse No Pulse Pulse -

## 2018-11-02 NOTE — PROGRESS NOTES
"DISCHARGE    SW to pt's room for discharge today.  Pt presents as sitting up in bedside chair with wife Kia in room.  Pt and wife present as aao x4, calm, cooperative, and asking and answering questions appropriately.  Pt verbalizes understanding and agreement with plan for d/c to home today.  Wife reports she will transport pt home today.  Pt denies any d/c needs, and none identified by medical team.  Pt's wife still verbalizing frustration today with financial situation for transplant listing and states "I'm done."  Wife states "we pray God will continue to bless him [pt] with the LVAD" and states that they are "done" with trying to get listed for transplant.  Wife states that she will still forward info she receives from [facility] to SW once it is emailed.  Pt and wife report coping adequately at this time otherwise, and deny any needs or concerns to SW.  SW providing ongoing psychosocial and counseling support, education, resources, assistance, and discharge planning as indicated.  SW remains available.  "

## 2018-11-02 NOTE — PROGRESS NOTES
FOLLOW UP to TRANSPLANT RECIPIENT ADULT PSYCHOSOCIAL ASSESSMENT conducted on 5/9/18:    Pt and wife reported during hospital admission in 10/2018 that pt has been denied for Medicaid and they cannot afford $1,500 annual OOP on their [employer] supplement.  Wife met with financial emma Pacheco last week to discuss coverage.  SW spoke with pt in room on Tuesday 10/30 and pt reported wife updated him after she met with TIERNEY Pacheco, but pt did not recall details.  Wife was not in room because she had to go home for the day for an MD appt.  SW spoke with wife by phone in the afternoon and wife stated that they cannot afford $1,500/mth because that is their entire monthly income.  SW attempted to explain that the $1,500 amount is an annual OOP; however, difficult to explain over the phone as wife was not open to receiving information at the time.  Wife informed SW that she had called multiple agencies, foundations, and prescription asstc programs, and had talked to transplant pts she knows about their costs.  Wife also stated she looked for information on [employer] about immunosuppressant coverage & costs.  SW advised wife that SW will meet with pt and wife in-person later this week to discuss.    SW followed up with pt and wife in pt's room on 11/1.  Wife verbalized much frustration with being told that pt cannot be listed for transplant at this time due to finances.  SW again explained that $1,500 is annual OOP max and that pt and wife need to be prepared to pay that amount over the course of the year if pt has high copays on immunos.  Financial emma Pacheco reports that pt's Medicare will cover 80% of immunos and BCBS supplement will cover part of the remaining 20%, but not all of it.  As SW explained this, wife stated that she spent many hours today calling Medicare, Blue Cross, pt asstc foundations, etc.  Wife reports that [employer] told her that they will cover the remaining 20% on pt's immunos.  Wife states [employer] is  supposed to be emailing her a copy of this information in the next 48 hrs.  SW asked wife to please forward the email once she receives it, as this is different than the benefits that were verified by financial coord.  Wife also states that she was told on her phone calls and saw on Google that most of these meds would only have a $4 copay and that she and pt are able afford this.  Wife not receptive to information from SW that $4 copays are not typically what is seen for pt's with Medicare coverage.  Pt quiet throughout discussion and deferring to wife on financial matters.      SUITABILITY: Patient does NOT present as a suitable candidate for transplant at this time due to pt & wife report that they are unable to afford $1,500 annual OOP.  SW also concerned about pt and wife's ability to receive and process information being provided by team in regard to financial coverage and responsibility.  ARINA and HTS team did discuss fundraising with pt and wife during hospital admission and encouraged them to begin fundraising.  Wife reports she has called several agencies about fundraising already.    Based on psychosocial risk factors, patient presents as medium risk due to limited finances and concerns about home environment.  Dgtr has reported that pt's home can be very cluttered at times, but is not unclean or dirty.  Since LVAD implant, pt and wife have demonstrated adequate coping.  Pt and wife have experienced increased distress at times and have experienced some relationship issues due to this distress, but report coping better at recent visits.

## 2018-11-02 NOTE — PROGRESS NOTES
Pt and wife AAAO.  Reviewed red heart alarm with them.  Listened to wife's concerns about fundraising for transplant.  She expressed concern because she said she reached out to  other tx patients and they didn't have to do this.  I re-directed her and asked pt how he has been doing and if he has been up and getting around.  Kia explained that he had 2 days where he didn't get up too much and has a little skin breakdown between his cheeks now, but she is applying the barrier cream to protect him.  Emotional support provided.

## 2018-11-05 NOTE — PROGRESS NOTES
Suman Hayden is a 68 y.o. male s/p HM3 7/2017 who was admitted for anemia and suspicion for GIB. Post implant hx includes GI ulcer requiring surgery, as well as recurrent GIB, and VT. INR Goal: Reduced to 2-2.5. 3mg tonight, followed by 2mg/day. Recheck INR Monday closely, as patient's INR is labile

## 2018-11-05 NOTE — PROGRESS NOTES
Patient Kia Najera  advised of dose instructions 2 mg daily  and verbalized understanding.  Will recheck  11 /7 / 18 .

## 2018-11-07 NOTE — PROGRESS NOTES
Wife Kia Hayden questioned and confirmed dose .  Reports patient also has 5 mg tablets, Ensure/Boost x3 daily, and denies any other changes .  Stated patient is doing fine.

## 2018-11-07 NOTE — PROGRESS NOTES
Patient wife junito serrano  advised of dose instructions 11/2 on 2mg/day and verbalized understanding.  Will recheck   11/9 /18   .

## 2018-11-07 NOTE — PHYSICIAN QUERY
PT Name: Suman Hayden  MR #: [id number]     Physician Query Form - Documentation Clarification      CDS/: Sergio Josue Jr, RN               Contact information:[email or web address]    This form is a permanent document in the medical record.     Query Date: November 7, 2018    By submitting this query, we are merely seeking further clarification of documentation. Please utilize your independent clinical judgment when addressing the question(s) below.    The Medical record reflects the following:    Supporting Clinical Findings Location in Medical Record   Chronic Systolic Heart Failure    Chronic combined systolic and diastolic congestive heart failure    11/2 Other Documentation    11/2 Discharge Summary                                                                                      Doctor, Please specify diagnosis or diagnoses associated with above clinical findings.  Clarify the Heart Failure Documentation     Provider Use Only    (  x  )Chronic Systolic Heart Failure    (    )Chronic Combined Systolic and Diastolic Congestive Heart Failure     (    )Other_________________________________________________________                                                                                                              Clinically Undetermined

## 2018-11-09 NOTE — PROGRESS NOTES
Patient wife  advised of dose instructions  2mg/day--continue this dose,  and verbalized understanding.  Will recheck  11 / 12 /18   .

## 2018-11-14 NOTE — PROGRESS NOTES
Big jump in INR. Noted increase in S.Cr and BNP. Will adjust slightly for today based on INR trend and recent GI bleed.

## 2018-11-14 NOTE — PATIENT INSTRUCTIONS
Increase lisinopril to 5mg daily    Blood work in 1 week to check the kidneys and potassium    Call us if you find yourself getting more short of breath, have more swelling or unexpected weight changes, fever, chills, alarms, bloody or black bowel movements, or drainage from your driveline

## 2018-11-14 NOTE — PROGRESS NOTES
Wife was called and given lab result, reports no changes, nothing new, verified coumadin: 2mg -Monday 11/12/18 and 3mg -yesterday Tuesday 11/13/18, Wife was advised to have patient take coumadin: 1mg tonight -11/14, then 2mg daily and get lab drawn Tuesday 11/20/18 and if no call from coumadin clinic on 11/20 then take 3mg 11/20, wife repeated instructions back correctly, appointment booked

## 2018-11-14 NOTE — LETTER
November 14, 2018        Joe Ernst  [address] [place] [place] [address]  Phone: [phone]  Fax: [phone]             [facility] [address] [place] [address]  Phone: [phone]   Patient: Suman Hayden   MR Number: [id number]   YOB: 1950   Date of Visit: 11/14/2018       Dear Dr. Joe Ernst    Thank you for referring Suman Hayden to me for evaluation. Attached you will find relevant portions of my assessment and plan of care.    If you have questions, please do not hesitate to call me. I look forward to following Suman Hayden along with you.    Sincerely,    Graciela Bautista MD    Enclosure    If you would like to receive this communication electronically, please contact [email or web address] or [phone] to request [employer] access.    [employer] is a tool which provides read-only access to select patient information with whom you have a relationship. Its easy to use and provides real time access to review your patients record including encounter summaries, notes, results, and demographic information.    If you feel you have received this communication in error or would no longer like to receive these types of communications, please e-mail [email or web address]

## 2018-11-14 NOTE — PROGRESS NOTES
Subjective:   Patient ID:  Suman Hayden is a 68 y.o. male who presents for LVAD followup visit.    Implant Date:7/27/17     HM 3 RPM: 5200  [place] [id number]     INR goal: 2-3   NO Bridge with Heparin   Antiplatelets: ASA stopped on 12/22/17 after GI bleed and ileal ulcer      GI Bleed: 8/17, 12/17, 1/18      TXP SHERRI INTERROGATIONS 11/14/2018   Type HeartMate3   Flow 3.5   Speed 5200   PI 6.2   Power ([place]) 3.6   LSL 4800   Pulsatility Pulse       HPI  Mr. Hayden is a  Very pleasant 68 BM with chronic systolic heart failure secondary to non ischemic cardiomyopathy underwent Heartmate  3 LVAD implanted as DT therapy 7/27/17. He underwent ICD revision on 11/20/17 with Dr. Vidal (DC ICD placed with active RA/LV leads (RV lead capped during revision) that was complicated by pocket hematoma. In January 2018 he developed staph epid bacteremia.  Post-op course further complicated by GI bleeding due to ilieal bleed that was APC'ed using double balloon enteroscopy by Dr. Chatterjee Dec 2017. His aspirin was stopped 12/22/17 and PPI increased to BID . He was re-admitted 1/25/17 where he had a double balloon enteroscopy without active bleed, but with ulceration, and subsequent retrograde double balloon enteroscopy on 1/26/18 which was unrevealing. Due to recurrent bleeding in March 2018 he underwent partial small bowel resection on 3/13/2018.  Since that time he has done well without recurrent bleeding. He was admitted June 2018 for ICD shock thought to be due to aflutter with abberrancy, treated with amiodarone. Was seen by EP Dr. Zee who did not see any further A. flutter episodes and recommend to continue monitoring with RFA as an option shsoudl the patient need it in the future.      Since last visit, pt was admited 10/14-11/2 due to GI bleed. GI was consulted. INDIRA on admit likely due to hypoperfusion resolved with blood transfusion. ACE was held on admission as well and was restarted when renal function returned  "to baseline. Push enteroscopy was completed on 10/26 that was negative for source. Patency capsule was next placed due to history of ileal resection (prior to VCE) to ensure that the gut is open/unobstructed and moves normally. On repeat KUB capsule was noted in LUQ. Per GI there is no reason to attempt VCE due to patency capsule retained in LUQ. At that time H & H remained stable. He was transfused a total of 4 U PRBCs while inpatient. He remains a NO heparin bridge/off of ASA. 3mg tonight, followed by 2mg/day.    Since dc home, pt has been feeling fine. No complaints today- no SOB, swelling, normal BM, no alarms at home. His wife is upset about the need for fundraising for IS.       DLES is grade 1    Interrogation of device data reveals no alarms, normal flows and power (see VAD interrogation report for full details.)            Review of Systems   Constitution: Negative. Negative for chills, decreased appetite, diaphoresis, fever, weakness, malaise/fatigue, night sweats, weight gain and weight loss.   Eyes: Negative.    Cardiovascular: Negative for chest pain, claudication, cyanosis, dyspnea on exertion, irregular heartbeat, leg swelling, near-syncope, orthopnea, palpitations, paroxysmal nocturnal dyspnea and syncope.   Respiratory: Negative for cough, hemoptysis and shortness of breath.    Endocrine: Negative.    Hematologic/Lymphatic: Negative.    Skin: Negative for color change, dry skin and nail changes.   Musculoskeletal: Negative.    Gastrointestinal: Negative.    Genitourinary: Negative.        Objective:   Blood pressure (!) 104/0, pulse 81, temperature 97.6 °F (36.4 °C), temperature source Oral, height 5' 9" (1.753 m), weight 76.2 kg (168 lb).body mass index is 24.81 kg/m².    Doppler: 104 (PULSATILE, MAP= 100 mm of Hg)    Physical Exam   Constitutional: He appears well-developed.   HENT:   Head: Normocephalic.   Neck: No JVD present. Carotid bruit is not present.   Cardiovascular: Regular rhythm and " "normal heart sounds.   No murmur heard.  Smooth VAD hum. DLES is "1"   Pulmonary/Chest: Effort normal and breath sounds normal. No respiratory distress. He has no wheezes. He has no rales.   Abdominal: Soft. Bowel sounds are normal. He exhibits no distension. There is no tenderness. There is no rebound.   Musculoskeletal: He exhibits no edema.   Neurological: He is alert.   Skin: Skin is warm.   Vitals reviewed.      Lab Results   Component Value Date    WBC 5.85 11/14/2018    HGB 8.7 (L) 11/14/2018    HCT 31.1 (L) 11/14/2018    MCV 87 11/14/2018     11/14/2018    CO2 19 (L) 11/14/2018    CREATININE 1.3 11/14/2018    CALCIUM 9.3 11/14/2018    BILIRUTOT 0.3 01/15/2018    ALBUMIN 3.6 11/14/2018    AST 25 11/14/2018    BNP 1,095 (H) 11/14/2018    ALT 18 11/14/2018     11/14/2018       Lab Results   Component Value Date    INR 2.8 (H) 11/14/2018    INR 1.9 (H) 11/12/2018    INR 1.8 (H) 11/09/2018       BNP   Date Value Ref Range Status   11/14/2018 1,095 (H) 0 - 99 pg/mL Final     Comment:     Values of less than 100 pg/ml are consistent with non-CHF populations.   11/02/2018 310 (H) 0 - 99 pg/mL Final     Comment:     Values of less than 100 pg/ml are consistent with non-CHF populations.   10/31/2018 308 (H) 0 - 99 pg/mL Final     Comment:     Values of less than 100 pg/ml are consistent with non-CHF populations.       LD   Date Value Ref Range Status   11/14/2018 194 110 - 260 U/L Final     Comment:     Results are increased in hemolyzed samples.   11/02/2018 143 110 - 260 U/L Final     Comment:     Results are increased in hemolyzed samples.   11/01/2018 133 110 - 260 U/L Final     Comment:     Results are increased in hemolyzed samples.       Assessment:      1. Chronic combined systolic and diastolic congestive heart failure    2. Heart replaced by heart assist device    3. VT (ventricular tachycardia)    4. Essential hypertension    5. Nonischemic cardiomyopathy    6. Atrial tachycardia    7. Chronic " anticoagulation    8. Normocytic anemia    9. Iron deficiency anemia due to chronic blood loss        Plan:   Patient is now NYHA class II. BP is not optimally controlled.  Increase lisinopril to 5mg daily, BMP in BR in 1 wk    BNP is up today but euvolemic on exam, suspect it's related to HTN    Repair to battery cable today which is knicked    Refilled his marinol today but I did not refill his pain pill as requested for his tailbone pain. Can use tylenol.      INR is therapeutic.   VAD interrogation was performed in clinic  Any VAD management kits dispensed today medically necessary  Recommend 2 gram sodium restriction and 1500cc fluid restriction.  Encourage physical activity with graded exercise program.  Requested patient to weigh themselves daily, and to notify us if their weight increases by more than 3 lbs in 1 day or 5 lbs in 1 week.     Listed for transplant: No - Unable to determine transplant candidacy at this time (presented 10/31/18) due to need for fundraising to cover copays. Will plan to reassess at the end of the year.  ARINA will continue to work with the pt and wife on this.    UNOS Patient Status  Functional Status: 80% - Normal activity with effort: some symptoms of disease  Physical Capacity: No Limitations  Working for Income: Unknown    F/u 4 wk with labs and ARINA/Nick visit to update candidacy for listing ($)

## 2018-11-14 NOTE — PROGRESS NOTES
"Date of Implant with Heartmate 3 LVAD: 17    PATIENT ARRIVED IN CLINIC:  Ambulatory  Accompanied by:Wife    Vitals  Doppler:104, pulsatile /87   Pulsatile:yes  Temperature, oral: 97.6  PAIN:0 on 0-10 pain scale,   location of pain:   na,   description of pain:  na  Is patient currently on medications for pain?yes   What kind? norco  Does this help relieve the pain? yes    VAD Interrogation:  TXP SHERRI INTERROGATIONS 2018   Type HeartMate3   Flow 3.5   Speed 5200   PI 6.2   Power ([place]) 3.6   LSL 4800   Pulsatility Pulse       Flow in history: 3.4-4.3  History [id number]  HCT:31.1    Problems / Issues / Alarms with VAD if any:occasional PI events   Any Equipment Issues? (Refer to equipment coordinators note for complete details):yes, patient had tear on battery cord, rescue tape applied per Silverio Mccord .   Emergency Equipment With Patient:yes   VAD Binder With Patient: yes   Reviewed VAD Numbers In Binder:yes  VAD Sounds: HM3 sound   LVAD Dressing/DLES:  Appearance Of Driveline:"1"  Antibiotics:NO  Velour:no  Frequency of Dressing Changes:Every other day with soap and water   Stabilization Device In Use: YES [place]    Manual & Visual Inspection Of Driveline:  No kinks or tears noted   Modular Cable Connection Intact(no yellow exposed): YES    Attempted to unscrew modular cable to ensure it will be able to come lose in the event we ever need to change the modular cable while pt held the driveline in place so it would not move. Modular cable connection able to be unscrewed and re-tightened.  Instructed pt to perform this weekly.     Pt In Need Of Management Kits?:no   It is medically necessary to have VAD management kits in order to prevent infection or to assist in the healing of an infected DLES.    Assessment:   Complaints/reason for visit today: Routine follow up  Complaints Of Nausea / Vomiting:none   Appearance and Frequency Of Stools:normal and formed " without blood daily  Patient reports urine is clear and yellow:  YES     Coping/Depression/Anxiety:patient coping ok  Sleep Habits:8 or more hrs /night  Sleep Aids:na  Showering :NO, Reminded patient to change dressing immediately after shower and drying off.   Activity/Exercise:walking daily   Driving:no, Reminded to pull over should there be an alarm before looking down at controller.     Labs:    Chemistry        Component Value Date/Time     11/14/2018 0955     01/15/2018    K 3.5 11/14/2018 0955    K 3.8 01/15/2018     11/14/2018 0955     01/15/2018    CO2 19 (L) 11/14/2018 0955    CO2 25 01/15/2018    BUN 10 11/14/2018 0955    BUN 18 01/15/2018    CREATININE 1.3 11/14/2018 0955    CREATININE 0.9 01/15/2018     (H) 11/14/2018 0955        Component Value Date/Time    CALCIUM 9.3 11/14/2018 0955    CALCIUM 8.8 01/15/2018    ALKPHOS 68 11/14/2018 0955    AST 25 11/14/2018 0955    AST 17 01/15/2018    ALT 18 11/14/2018 0955    ALT 14 01/15/2018    BILITOT 0.4 11/14/2018 0955    ESTGFRAFRICA >60.0 11/14/2018 0955    EGFRNONAA 56.1 (A) 11/14/2018 0955            Magnesium   Date Value Ref Range Status   11/14/2018 2.3 1.6 - 2.6 mg/dL Final       Lab Results   Component Value Date    WBC 5.85 11/14/2018    HGB 8.7 (L) 11/14/2018    HCT 31.1 (L) 11/14/2018    MCV 87 11/14/2018     11/14/2018       Lab Results   Component Value Date    INR 2.8 (H) 11/14/2018    INR 1.9 (H) 11/12/2018    INR 1.8 (H) 11/09/2018       BNP   Date Value Ref Range Status   11/14/2018 1,095 (H) 0 - 99 pg/mL Final     Comment:     Values of less than 100 pg/ml are consistent with non-CHF populations.   11/02/2018 310 (H) 0 - 99 pg/mL Final     Comment:     Values of less than 100 pg/ml are consistent with non-CHF populations.   10/31/2018 308 (H) 0 - 99 pg/mL Final     Comment:     Values of less than 100 pg/ml are consistent with non-CHF populations.       LD   Date Value Ref Range Status   11/14/2018 194  110 - 260 U/L Final     Comment:     Results are increased in hemolyzed samples.   11/02/2018 143 110 - 260 U/L Final     Comment:     Results are increased in hemolyzed samples.   11/01/2018 133 110 - 260 U/L Final     Comment:     Results are increased in hemolyzed samples.       Labs reviewed with patient: YES      Patient Satisfaction Survey completed per patient: no  (explained about signature and box to check)  Medication reconciliation: per MA.  Purple card updated today:no, patient did not bring card  Coumadin Managed by: [facility].     Education: Reviewed driveline care, emergency procedures, alarms with patient.  Reminded patient never to change the controller without paging the VAD coordinator first.   Also reviewed how to get in touch with a VAD coordinator in the event of an emergency.         Plans/Needs:Patient presents today for routine followup. BP elevated but otherwise feels good. Per Dr. Bautista, patient to increase lisinopril to 5 mg daily and have BMP 1 week. No other changes today. Patient to RTC 1 month. Refer to MD note.

## 2018-11-14 NOTE — PROCEDURES
TXP SHERRI INTERROGATIONS 11/14/2018 11/2/2018 11/1/2018 11/1/2018 11/1/2018 10/31/2018 10/31/2018   Type HeartMate3 - - - - - -   Flow 3.5 - - - - - -   Speed 5200 - - - - - -   PI 6.2 - - - - - -   Power ([place]) 3.6 - - - - - -   LSL 4800 - - - - - -   Pulsatility Pulse Pulse No Pulse No Pulse Pulse No Pulse No Pulse   }

## 2018-11-15 NOTE — PROGRESS NOTES
Patient wife advised of dose instructions 1mg 11/15 & resume new dose plan Will recheck 11 / 19 /18   .

## 2018-11-15 NOTE — PROGRESS NOTES
inr was planned 11/20, please change to 11/19 due to dose mistake and to watch closely due to recent GI bleed

## 2018-11-15 NOTE — PROGRESS NOTES
Patient wife called to report she got confused and patient taken 2 mg last night 11/14 instead of 1 mg tablets .

## 2018-11-19 NOTE — PROGRESS NOTES
Patient wife  advised of dose instructions and verbalized understanding.  Will recheck   11/ 21 / 18  .

## 2018-11-19 NOTE — TELEPHONE ENCOUNTER
Per wife, she has been in contact with [place] [employer] to fill out an application to help with paying for immunosuppressants after transplant.   Advised pt to fill out what she needs to fill out then fax application to myself and I will discuss with SW.   Wife voiced understanding.

## 2018-11-19 NOTE — TELEPHONE ENCOUNTER
----- Message from Rebecca Webster sent at 11/19/2018  2:03 PM CST -----  Contact: Kia pt wife [phone]   Pt wife says she's found an organization to fund her 's rejection medicine and she need some information to give to them. Please call her at the number listed.    Thanks

## 2018-11-19 NOTE — PROGRESS NOTES
Wife Kia Hayden questioned and confirmed dose . Appetite is fine. Reports no bleeding bruising or etc.

## 2018-11-21 NOTE — PROGRESS NOTES
Patient wife  advised of dose instructions 2mg/day x 1mg we/sat -  new dose    and verbalized understanding.  Will recheck   11/26 / 18 .

## 2018-11-23 PROBLEM — L08.9 INFECTION, SKIN: Status: ACTIVE | Noted: 2018-01-01

## 2018-11-23 PROBLEM — R65.20 SEVERE SEPSIS: Status: ACTIVE | Noted: 2018-01-01

## 2018-11-23 PROBLEM — Z87.898: Status: ACTIVE | Noted: 2018-01-01

## 2018-11-23 PROBLEM — A41.9 SEPSIS AFFECTING SKIN: Status: ACTIVE | Noted: 2018-01-01

## 2018-11-23 PROBLEM — Z87.898: Status: RESOLVED | Noted: 2018-01-01 | Resolved: 2018-01-01

## 2018-11-23 PROBLEM — R74.01 TRANSAMINITIS: Status: ACTIVE | Noted: 2018-01-01

## 2018-11-23 NOTE — TELEPHONE ENCOUNTER
Mrs. Hayden called to report pt has blood and drainage from his DLES.  She is sending pictures via e-mail.  She also told me he has 3 boils in his groin area.  She said she he pulled the ingrown hairs out and she applied ointment and they are getting better.   1030: pictures received.  Asked Kia to come to clinic so we can look at his DLES and culture it if possible. Kia asked if I could look at his boils too and I explained no, that should be his PCP to look and treat that. Kia told me that they havent seen the PCP since VAD and I said this would be a good time to go back.  She said she will ask the PCP to look at his driveline and not bother with coming here. I told her that is not a good idea and to come here.  I told her we would see if ID is available to see him and maybe they can look at his boils, but no promises.  Asked her to get labs and come up.  Kia verbalized understanding and agreement. Dr. Torres notified.

## 2018-11-23 NOTE — ED TRIAGE NOTES
Abscess (abscess on scrotal area draining, lvad drive line had + culture, sent here to get admitted)  Pt denies SOB/chest pain/n/v

## 2018-11-23 NOTE — ED NOTES
Patient identifiers verified and correct for Suman Hayden.   LOC: The patient is awake, alert and aware of environment with an appropriate affect, the patient is oriented x 3 and speaking appropriately.   APPEARANCE: Patient appears comfortable and in no acute distress, patient is clean and well groomed.  SKIN: The skin is warm and dry, color consistent with ethnicity, patient has normal skin turgor and moist mucus membranes, no breakdown or bruising noted. Pt has redness and drainage at LVAD driveline insertion site.  MUSCULOSKELETAL: Patient moving all extremities spontaneously, no swelling noted.  RESPIRATORY: Airway is open and patent, respirations are spontaneous, patient has a normal effort and rate, no accessory muscle use noted, pt placed on continuous pulse ox with O2 sats noted at 97% on room air.  CARDIAC: Pt placed on cardiac monitor. Patient has a normal rate and regular rhythm, no edema noted, capillary refill < 3 seconds.   GASTRO: Soft and non tender to palpation, no distention noted, normoactive bowel sounds present in all four quadrants. Pt states bowel movements have been regular.  : Pt denies any pain or frequency with urination.  NEURO: Pt opens eyes spontaneously, behavior appropriate to situation, follows commands, facial expression symmetrical, bilateral hand grasp equal and even, purposeful motor response noted, normal sensation in all extremities when touched with a finger.

## 2018-11-23 NOTE — ED PROVIDER NOTES
Encounter Date: 11/23/2018       History     Chief Complaint   Patient presents with    Abscess     abscess on scrotal area draining, lvad drive line had + culture, sent here to get admitted    LVAD     68 yoAAM PMHx of [facility], S/P Heartmate 3 as DT therapy 7/27/17, ICD revision 11/20/17 with Dr. 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  Recently discharged on the 14th after being transferred here from [facility] for a gi bleed the etiology of which is currently undetermined.  He is NOT to be bridged with heparin and is on coumadin 2mg po daily.    History is obtained mostly from his wife as he is confused.  She reports they left the hospital on 11/14.  On the 19th he started feeling weak and somnolent.  This was associated with intermittent fevers the highest of which is reported today as above.  He has also been slightly confused switching his brothers' names, has had urinary incontinence with some hematuria, gait instability.  Additionally, his wife has noticed that, though he has chronic abcesses on his scrotum, that these abcesses began draining for which she placed an ointment on it.  Also, he has been having purulent drainage from his LVAD driveline site.                Review of patient's allergies indicates:   Allergen Reactions    Asa [aspirin] Other (See Comments)     Bleeding ulcer     Past Medical History:   Diagnosis Date    Arthritis     Cardiomyopathy     CHF (congestive heart failure)     Coronary artery disease     Encounter for blood transfusion     Gastric polyp     Hyperlipidemia     Hypertension     Hypotension, iatrogenic     Iron deficiency anemia due to chronic blood loss 10/15/2018    LVAD (left ventricular assist device) present     Obesity     S/P implantation of automatic cardioverter/defibrillator (AICD)      Ventricular tachycardia      Past Surgical History:   Procedure Laterality Date    ABDOMINAL SURGERY      APPENDECTOMY      CARDIAC CATHETERIZATION      CARDIAC DEFIBRILLATOR PLACEMENT      CARDIAC DEFIBRILLATOR PLACEMENT      CLOSURE-STERNAL WOUND N/A 7/28/2017    Performed by Sunny Downing MD at [facility] OR 2ND FLR    COLONOSCOPY      COLONOSCOPY N/A 3/9/2018    Procedure: COLONOSCOPY;  Surgeon: Andres Chatterjee MD;  Location: [facility] (2ND FLR);  Service: Endoscopy;  Laterality: N/A;    COLONOSCOPY N/A 8/8/2018    Procedure: COLONOSCOPY;  Surgeon: Andres Chatterjee MD;  Location: [facility] (2ND FLR);  Service: Endoscopy;  Laterality: N/A;    COLONOSCOPY N/A 8/8/2018    Performed by Andres Chatterjee MD at [facility] ENDO (2ND FLR)    COLONOSCOPY N/A 3/9/2018    Performed by Andres Chatterjee MD at [facility] (2ND FLR)    DEVICE ASSISTED ENTEROSCOPY-ANTEROGRADE N/A 1/25/2018    Performed by Andres Chatterjee MD at [facility] (2ND FLR)    DEVICE ASSISTED ENTEROSCOPY-ANTEROGRADE N/A 12/14/2017    Performed by Andres Chatterjee MD at [facility] (2ND FLR)    EGD (ESOPHAGOGASTRODUODENOSCOPY) N/A 10/26/2018    Performed by Jessica Casillas MD at [facility] (2ND FLR)    EGD (ESOPHAGOGASTRODUODENOSCOPY) N/A 8/8/2018    Performed by Andres Chatterjee MD at [facility] (2ND FLR)    ESOPHAGOGASTRODUODENOSCOPY      ESOPHAGOGASTRODUODENOSCOPY N/A 8/8/2018    Procedure: EGD (ESOPHAGOGASTRODUODENOSCOPY);  Surgeon: Andres Chatterjee MD;  Location: [facility] (2ND FLR);  Service: Endoscopy;  Laterality: N/A;    ESOPHAGOGASTRODUODENOSCOPY N/A 10/26/2018    Procedure: EGD (ESOPHAGOGASTRODUODENOSCOPY);  Surgeon: Jessica Casillas MD;  Location: [facility] (2ND FLR);  Service: Endoscopy;  Laterality: N/A;  Push endoscopy    ESOPHAGOGASTRODUODENOSCOPY (EGD) N/A 3/6/2018    Performed by Andres Chatterjee MD at [facility] (2ND FLR)    ESOPHAGOGASTRODUODENOSCOPY (EGD) N/A 12/8/2017    Performed by Gagan Barger MD at [facility] (2ND FLR)     ESOPHAGOGASTRODUODENOSCOPY (EGD) N/A 8/17/2017    Performed by Kishore Mills MD at [facility] ENDO (2ND FLR)    EXPLORATORY-LAPAROTOMY with small bowel resection  N/A 3/13/2018    Performed by Kenyon Tellez MD at [facility] OR 2ND FLR    HEART CATH-RIGHT Right 7/3/2017    Performed by Angie Torres MD at [facility] CATH LAB    INSERTION-LEFT VENTRICULAR ASSIST DEVICE N/A 7/27/2017    Performed by Sunny Downing MD at [facility] OR 2ND FLR    INSERTION-LEFT VENTRICULAR ASSIST DEVICE N/A 7/25/2017    Performed by Sunny Downing MD at [facility] OR 2ND FLR    LEFT VENTRICULAR ASSIST DEVICE  07/27/2017    PLACEMENT-NEOPERICARDIUM N/A 7/28/2017    Performed by Sunny Downing MD at [facility] OR 2ND FLR    RESECTION-BOWEL  3/13/2018    Performed by Kenyon Tellez MD at [facility] OR 2ND FLR    Retrograde deivce assisted enteroscopy N/A 1/26/2018    Performed by Jesse Davis MD at [facility] ENDO (2ND FLR)    REVISION-LEAD-ICD N/A 11/20/2017    Performed by Rubén Winchester MD at [facility] CATH LAB    TONSILLECTOMY      TRANSESOPHAGEAL ECHOCARDIOGRAM (GLENN) N/A 1/9/2018    Performed by [facility] Diagnostic Provider at [facility] CATH LAB     Family History   Problem Relation Age of Onset    Heart disease Mother     Heart disease Father      Social History     Tobacco Use    Smoking status: Never Smoker    Smokeless tobacco: Never Used   Substance Use Topics    Alcohol use: No    Drug use: No     Review of Systems   Constitutional: Positive for activity change (somnolent more so than normal).   HENT: Negative.    Eyes: Negative.    Respiratory: Negative.    Cardiovascular: Negative.    Gastrointestinal:        Driveline purulence   Endocrine: Negative.    Genitourinary: Positive for hematuria and testicular pain.   Musculoskeletal: Negative.    Skin: Negative.    Allergic/Immunologic: Negative.    Neurological: Negative.    Hematological: Negative.    Psychiatric/Behavioral: Negative.        Physical Exam     Initial Vitals [11/23/18  1519]   BP Pulse Resp Temp SpO2   (!) 72/36 78 20 (!) 101 °F (38.3 °C) --      MAP       --         Physical Exam    Constitutional: He appears well-developed and well-nourished.   HENT:   Head: Normocephalic and atraumatic.   Eyes: Conjunctivae, EOM and lids are normal. Pupils are equal, round, and reactive to light.   Neck: Normal range of motion. Neck supple. No JVD present.   Cardiovascular: S1 normal and S2 normal. Exam reveals no gallop.    No murmur heard.  Pulses:       Radial pulses are 2+ on the right side, and 2+ on the left side.        Dorsalis pedis pulses are 2+ on the right side, and 2+ on the left side.        Posterior tibial pulses are 2+ on the right side, and 2+ on the left side.   audbile vad hum   Abdominal: Soft. Normal appearance and bowel sounds are normal. He exhibits no distension and no ascites.   Purulent discharge around driveline noted   Musculoskeletal: Normal range of motion.   Neurological: He is alert and oriented to person, place, and time. No sensory deficit.   Skin: Skin is warm, dry and intact. Capillary refill takes less than 2 seconds.         ED Course   Procedures  Labs Reviewed   CULTURE, BLOOD   CULTURE, BLOOD   CBC W/ AUTO DIFFERENTIAL   COMPREHENSIVE METABOLIC PANEL   LACTIC ACID, PLASMA   URINALYSIS, REFLEX TO URINE CULTURE   B-TYPE NATRIURETIC PEPTIDE   MAGNESIUM          Imaging Results    None          Medical Decision Making:   Initial Assessment:   68 yoAAM PMHx of [facility], S/P Heartmate 3 as DT therapy 7/27/17, ICD revision 11/20/17 with Dr. 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  Differential Diagnosis:   Driveline infection  uti  Scrotal abcess  Clinical Tests:   Lab Tests: Ordered and Reviewed  Radiological Study: Ordered  ED Management:  Vanc/cef x 1  dose  Ct a/p non-con  Labs ordered and reviewed  Other:   I have discussed this case with another health care provider.  Sepsis 2/2 driveline infection on broad spectrum abx                      Clinical Impression:   Diagnoses of CHF (congestive heart failure) and Chronic systolic (congestive) heart failure were pertinent to this visit.

## 2018-11-23 NOTE — ASSESSMENT & PLAN NOTE
HM3 5200 rpm  Not on diuretics  Will get chest CT and abdomen and pelvis for worsening DLES infection  Continue coumadin for INR 2-3, off aspirin due to multiple GI bleeds (no heparin gtt)    Procedure: Device Interrogation Including analysis of device parameters  Current Settings: Ventricular Assist Device  Review of device function is stable/unstable stable    TXP LVAD INTERROGATIONS 11/23/2018 11/23/2018 11/14/2018 11/2/2018 11/2/2018 11/2/2018 11/1/2018   Type HeartMate3 HeartMate3 - HeartMate3 HeartMate3 HeartMate3 HeartMate3   Flow 4.4 4.4 - 3.4 3.8 4.1 4.2   Speed 5200 5200 - 5250 5200 5200 5200   PI 2.6 2.6 - 4.8 3.7 3.3 3.1   Power ([place]) 3.6 3.6 - 3.6 3.6 3.5 3.6   LSL - - - 4850 - - -   Pulsatility - - Pulse Pulse - - -

## 2018-11-23 NOTE — PROGRESS NOTES
Pt and wife here to possible DLES infection.  Pt very weak, can barely get onto exam table.  DLES cultured.  Odor detected.  Dr. Funes and Natalia Crystal came up to see pt, but I explained we are probably admitting him.  Notified Dr. Torres of patient's condition, she asked to send him to the ER.  VAD interrogation completed, no alarms noted in history per Rowan Osorio.  Pt and wife sent to ER.

## 2018-11-23 NOTE — H&P
[facility]  Heart Transplant  H&P    Patient Name: Suman Hayden  MRN: [id number]  Admission Date: 11/23/2018  Attending Physician: Mason Goyal, *  Primary Care Provider: Joe Ernst MD  Principal Problem:<principal problem not specified>    Subjective:     History of Present Illness:  Mr. Hayden is a  Very pleasant 68 BM with chronic systolic heart failure secondary to non ischemic cardiomyopathy underwent Heartmate  3 LVAD implanted as DT therapy 7/27/17. Presents to ED today from clinic for worsening DLES infection and fever. Patient seen by VAD nurse and ID staff who feel admission to [facility] with IV antibiotics warranted. Patient also with fevers at home. Of note, hgb stable, INR 2.     Past Medical History:   Diagnosis Date    Arthritis     Cardiomyopathy     CHF (congestive heart failure)     Coronary artery disease     Encounter for blood transfusion     Gastric polyp     Hyperlipidemia     Hypertension     Hypotension, iatrogenic     Iron deficiency anemia due to chronic blood loss 10/15/2018    LVAD (left ventricular assist device) present     Obesity     S/P implantation of automatic cardioverter/defibrillator (AICD)     Ventricular tachycardia        Past Surgical History:   Procedure Laterality Date    ABDOMINAL SURGERY      APPENDECTOMY      CARDIAC CATHETERIZATION      CARDIAC DEFIBRILLATOR PLACEMENT      CARDIAC DEFIBRILLATOR PLACEMENT      CLOSURE-STERNAL WOUND N/A 7/28/2017    Performed by Sunny Downing MD at [facility] OR 2ND FLR    COLONOSCOPY      COLONOSCOPY N/A 3/9/2018    Procedure: COLONOSCOPY;  Surgeon: Andres Chatterjee MD;  Location: [facility] ([address] [place] [address]);  Service: Endoscopy;  Laterality: N/A;    COLONOSCOPY N/A 8/8/2018    Procedure: COLONOSCOPY;  Surgeon: Andres Chatterjee MD;  Location: [facility] ([address] [place] [address]);  Service: Endoscopy;  Laterality: N/A;    COLONOSCOPY N/A 8/8/2018    Performed by Andres Chatterjee MD at [facility] ([address] [place] [address])     COLONOSCOPY N/A 3/9/2018    Performed by Andres Chatterjee MD at [facility] ENDO (2ND FLR)    DEVICE ASSISTED ENTEROSCOPY-ANTEROGRADE N/A 1/25/2018    Performed by Andres Chatterjee MD at [facility] ENDO (2ND FLR)    DEVICE ASSISTED ENTEROSCOPY-ANTEROGRADE N/A 12/14/2017    Performed by Andres Chatterjee MD at [facility] ENDO (2ND FLR)    EGD (ESOPHAGOGASTRODUODENOSCOPY) N/A 10/26/2018    Performed by Jessica Casillas MD at [facility] ENDO (2ND FLR)    EGD (ESOPHAGOGASTRODUODENOSCOPY) N/A 8/8/2018    Performed by Andres Chatterjee MD at [facility] ENDO (2ND FLR)    ESOPHAGOGASTRODUODENOSCOPY      ESOPHAGOGASTRODUODENOSCOPY N/A 8/8/2018    Procedure: EGD (ESOPHAGOGASTRODUODENOSCOPY);  Surgeon: Andres Chatterjee MD;  Location: [facility] (2ND FLR);  Service: Endoscopy;  Laterality: N/A;    ESOPHAGOGASTRODUODENOSCOPY N/A 10/26/2018    Procedure: EGD (ESOPHAGOGASTRODUODENOSCOPY);  Surgeon: Jessica Casillas MD;  Location: [facility] (2ND FLR);  Service: Endoscopy;  Laterality: N/A;  Push endoscopy    ESOPHAGOGASTRODUODENOSCOPY (EGD) N/A 3/6/2018    Performed by Andres Chatterjee MD at [facility] ENDO (2ND FLR)    ESOPHAGOGASTRODUODENOSCOPY (EGD) N/A 12/8/2017    Performed by Gagan Barger MD at [facility] ENDO (2ND FLR)    ESOPHAGOGASTRODUODENOSCOPY (EGD) N/A 8/17/2017    Performed by Kishore Mills MD at [facility] ENDO (2ND FLR)    EXPLORATORY-LAPAROTOMY with small bowel resection  N/A 3/13/2018    Performed by Kenyon Tellez MD at [facility] OR 2ND FLR    HEART CATH-RIGHT Right 7/3/2017    Performed by Angie Torres MD at [facility] CATH LAB    INSERTION-LEFT VENTRICULAR ASSIST DEVICE N/A 7/27/2017    Performed by Sunny Downing MD at [facility] OR 2ND FLR    INSERTION-LEFT VENTRICULAR ASSIST DEVICE N/A 7/25/2017    Performed by Sunny Downing MD at [facility] OR 2ND FLR    LEFT VENTRICULAR ASSIST DEVICE  07/27/2017    PLACEMENT-NEOPERICARDIUM N/A 7/28/2017    Performed by Sunny Downing MD at [facility] OR 2ND FLR    RESECTION-BOWEL  3/13/2018    Performed by Kenyon CORONADO  MD Rosalinda at [facility] OR 2ND FLR    Retrograde deivce assisted enteroscopy N/A 1/26/2018    Performed by Jesse Davis MD at [facility] ENDO (2ND FLR)    REVISION-LEAD-ICD N/A 11/20/2017    Performed by Rubén Winchester MD at [facility] CATH LAB    TONSILLECTOMY      TRANSESOPHAGEAL ECHOCARDIOGRAM (GLENN) N/A 1/9/2018    Performed by [facility] Diagnostic Provider at [facility] CATH LAB       Review of patient's allergies indicates:   Allergen Reactions    Asa [aspirin] Other (See Comments)     Bleeding ulcer       Current Facility-Administered Medications   Medication    [START ON 11/24/2018] amiodarone tablet 400 mg    ceFEPIme injection 2 g    dronabinol capsule 5 mg    ferrous sulfate EC tablet 325 mg    [START ON 11/24/2018] lisinopril tablet 5 mg    pantoprazole EC tablet 40 mg    pravastatin tablet 20 mg    sodium chloride 0.9% bolus 2,343 mL    [START ON 11/24/2018] spironolactone tablet 25 mg    vancomycin 1.5 g in 5 % dextrose 250 mL IVPB    warfarin tablet 3 mg     Current Outpatient Medications   Medication Sig    amiodarone (PACERONE) 200 MG Tab Take 2 tablets (400 mg total) by mouth once daily.    dronabinol (MARINOL) 5 MG capsule TAKE ONE CAPSULE BY MOUTH 3 TIMES DAILY BEFORE MEALS    ferrous sulfate 324 mg (65 mg iron) TbEC Take 1 tablet (325 mg total) by mouth 3 (three) times daily.    lisinopril (PRINIVIL,ZESTRIL) 5 MG tablet Take 1 tablet (5 mg total) by mouth once daily.    pantoprazole (PROTONIX) 40 MG tablet Take 1 tablet (40 mg total) by mouth 2 (two) times daily.    pravastatin (PRAVACHOL) 20 MG tablet Take 1 tablet (20 mg total) by mouth every evening.    spironolactone (ALDACTONE) 25 MG tablet Take 1 tablet (25 mg total) by mouth once daily.    warfarin (COUMADIN) 2 MG tablet Take 3mg (1 & 1/2) tabs on 11/2 only, followed by 2mg (1 tab) orally daily thereafter.    mirtazapine (REMERON) 7.5 MG Tab Take 1 tablet (7.5 mg total) by mouth nightly.    polyethylene glycol (GLYCOLAX) 17  gram PwPk Take 17 g by mouth daily as needed (constipation).     Family History     Problem Relation (Age of Onset)    Heart disease Mother, Father        Tobacco Use    Smoking status: Never Smoker    Smokeless tobacco: Never Used   Substance and Sexual Activity    Alcohol use: No    Drug use: No    Sexual activity: Not Currently     Review of Systems   Constitutional: Positive for chills and fever. Negative for activity change, appetite change and diaphoresis.   HENT: Negative for congestion.    Respiratory: Negative for cough, chest tightness and shortness of breath.    Cardiovascular: Negative for chest pain.   Gastrointestinal: Negative for abdominal distention, abdominal pain, diarrhea, nausea and vomiting.   Genitourinary: Negative for difficulty urinating.   Musculoskeletal: Negative for arthralgias.   Neurological: Negative for tremors, syncope and speech difficulty.   Psychiatric/Behavioral: Negative for agitation.     Objective:     Vital Signs (Most Recent):  Temp: (!) 101 °F (38.3 °C) (11/23/18 1519)  Pulse: 78 (11/23/18 1519)  Resp: 20 (11/23/18 1519)  BP: (!) 72/36 (11/23/18 1519)  SpO2: (not registering) (11/23/18 1519) Vital Signs (24h Range):  Temp:  [99.2 °F (37.3 °C)-101 °F (38.3 °C)] 101 °F (38.3 °C)  Pulse:  [78] 78  Resp:  [20] 20  BP: (72-77)/(0-36) 72/36     Patient Vitals for the past 72 hrs (Last 3 readings):   Weight   11/23/18 1519 78.1 kg (172 lb 3.2 oz)     Body mass index is 25.43 kg/m².    No intake or output data in the 24 hours ending 11/23/18 1558    Physical Exam   Constitutional: He appears well-developed and well-nourished.   HENT:   Head: Normocephalic.   Eyes: Pupils are equal, round, and reactive to light.   Neck: Normal range of motion. No JVD present.   Cardiovascular: Normal rate.   No murmur heard.  Pulmonary/Chest: Effort normal. No stridor. No respiratory distress.   Abdominal: Soft. He exhibits no distension. There is no tenderness.   Musculoskeletal: Normal  range of motion. He exhibits no edema.   Neurological: He is alert.   Skin: Skin is warm. He is not diaphoretic. There is erythema.       Significant Labs:  CBC:  Recent Labs   Lab 11/23/18  1426   WBC 23.73*   RBC 3.93*   HGB 9.6*   HCT 32.2*      MCV 82   MCH 24.4*   MCHC 29.8*     BNP:  Recent Labs   Lab 11/23/18  1426   BNP 1,873*     CMP:  Recent Labs   Lab 11/19/18  1139 11/23/18  1426   GLU 84 103   CALCIUM 9.2 8.7   ALBUMIN  --  2.8*   PROT  --  6.4    136   K 3.8 3.6   CO2 25 21*    105   BUN 10 23   CREATININE 1.2 1.6*   ALKPHOS  --  85   ALT  --  101*   AST  --  190*   BILITOT  --  2.4*      Coagulation:   Recent Labs   Lab 11/19/18  1139 11/21/18  1007 11/23/18  1426   INR 3.7* 2.8* 2.0*     LDH:  Recent Labs   Lab 11/23/18  1426   *     Microbiology:  Microbiology Results (last 7 days)     Procedure Component Value Units Date/Time    Blood culture x two cultures. Draw prior to antibiotics. [[id number]]     Order Status:  No result Specimen:  Blood     Blood culture x two cultures. Draw prior to antibiotics. [[id number]]     Order Status:  No result Specimen:  Blood           I have reviewed all pertinent labs within the past 24 hours.    Diagnostic Results:  I have reviewed and interpreted all pertinent imaging results/findings within the past 24 hours.    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  - Send blood culutres  - ID consult, will start broad spectrum abx with vanc and cefepime (given one dose of each in ED already)  -will get imaging with CT to look for further infection, abscess      LVAD (left ventricular assist device) present    HM3 5200 rpm  Not on diuretics  Will get chest CT and abdomen and pelvis for worsening DLES infection  Continue coumadin for INR 2-3, off aspirin due to multiple GI bleeds (no heparin gtt)    Procedure: Device Interrogation Including analysis of device parameters  Current Settings: Ventricular Assist  Device  Review of device function is stable/unstable stable    TXP LVAD INTERROGATIONS 11/23/2018 11/23/2018 11/14/2018 11/2/2018 11/2/2018 11/2/2018 11/1/2018   Type HeartMate3 HeartMate3 - HeartMate3 HeartMate3 HeartMate3 HeartMate3   Flow 4.4 4.4 - 3.4 3.8 4.1 4.2   Speed 5200 5200 - 5250 5200 5200 5200   PI 2.6 2.6 - 4.8 3.7 3.3 3.1   Power ([place]) 3.6 3.6 - 3.6 3.6 3.5 3.6   LSL - - - 4850 - - -   Pulsatility - - Pulse Pulse - - -        Essential hypertension    -resume ACE and aldactone  -goal MAP less than 90         MELISSA Long  Heart Transplant  [facility]

## 2018-11-23 NOTE — HPI
68 year old male with PMHx significant for HFrEF secondary to non ischemic cardiomyopathy underwent Heartmate  3 LVAD implanted as DT therapy 7/27/17 and VT on amiodarone s/p ICD (with revision in 11/2017 complicated by pocket hematoma). Presents to ED today from clinic for worsening DLES infection and fever. Patient seen by VAD nurse and ID staff who feel admission to [facility] with IV antibiotics warranted. Patient also with fevers, chills, decreased appetite, scrotal boils with spontaneous purulent discharge/rupture, urinary incontinence and confusion at home. History provided by patient and supplemented by his wife. He denies recent travel or being around sick contacts. He is complaint with taking his home medications. Of note, he was recently hospitalized for GIB, history of ileal ulcer s/p intervention. Being followed also by EP by Dr. Winchester for possible future VT ablation. Pt denies recent ICD shocks as well as low flow alarms from his VAD.  In the ER, he was found febrile with Tmax of 103 deg F, doppler BP 60-70's and HR 70s. He was given 2 Liters NS IVFs, cefepime/vancomycin and resumed on home meds except aldactone. Blood cultures sent as well as gram stain and culture of specimen from drive line. CXR showed left small pleural effusion and CT abdomen performed that showed small volume fluid inferior aspect of LVAD along with small pericardial effusion. Labs notable for leukocytosis, mild INDIRA, elevated AST/ALT/T.bili, BNP and lactate 2.7 as well as CRP of 215. LDH was 294. VAD was interrogated and noted to have PI events. Pt was admitted to the ICU for further management/care.

## 2018-11-23 NOTE — SUBJECTIVE & OBJECTIVE
Past Medical History:   Diagnosis Date    Arthritis     Cardiomyopathy     CHF (congestive heart failure)     Coronary artery disease     Encounter for blood transfusion     Gastric polyp     Hyperlipidemia     Hypertension     Hypotension, iatrogenic     Iron deficiency anemia due to chronic blood loss 10/15/2018    LVAD (left ventricular assist device) present     Obesity     S/P implantation of automatic cardioverter/defibrillator (AICD)     Ventricular tachycardia        Past Surgical History:   Procedure Laterality Date    ABDOMINAL SURGERY      APPENDECTOMY      CARDIAC CATHETERIZATION      CARDIAC DEFIBRILLATOR PLACEMENT      CARDIAC DEFIBRILLATOR PLACEMENT      CLOSURE-STERNAL WOUND N/A 7/28/2017    Performed by Sunny Downing MD at [facility] OR 2ND FLR    COLONOSCOPY      COLONOSCOPY N/A 3/9/2018    Procedure: COLONOSCOPY;  Surgeon: Andres Chatterjee MD;  Location: [facility] (2ND FLR);  Service: Endoscopy;  Laterality: N/A;    COLONOSCOPY N/A 8/8/2018    Procedure: COLONOSCOPY;  Surgeon: Andres Chatterjee MD;  Location: [facility] (2ND FLR);  Service: Endoscopy;  Laterality: N/A;    COLONOSCOPY N/A 8/8/2018    Performed by Andres Chatterjee MD at [facility] ENDO (2ND FLR)    COLONOSCOPY N/A 3/9/2018    Performed by Andres Chatterjee MD at [facility] (2ND FLR)    DEVICE ASSISTED ENTEROSCOPY-ANTEROGRADE N/A 1/25/2018    Performed by Andres Chatterjee MD at [facility] ENDO (2ND FLR)    DEVICE ASSISTED ENTEROSCOPY-ANTEROGRADE N/A 12/14/2017    Performed by Andres Chatterjee MD at [facility] (2ND FLR)    EGD (ESOPHAGOGASTRODUODENOSCOPY) N/A 10/26/2018    Performed by Jessica Casillas MD at [facility] ENDO (2ND FLR)    EGD (ESOPHAGOGASTRODUODENOSCOPY) N/A 8/8/2018    Performed by Andres Chatterjee MD at [facility] (2ND FLR)    ESOPHAGOGASTRODUODENOSCOPY      ESOPHAGOGASTRODUODENOSCOPY N/A 8/8/2018    Procedure: EGD (ESOPHAGOGASTRODUODENOSCOPY);  Surgeon: Andres Chatterjee MD;  Location: [facility] (2ND FLR);  Service:  Endoscopy;  Laterality: N/A;    ESOPHAGOGASTRODUODENOSCOPY N/A 10/26/2018    Procedure: EGD (ESOPHAGOGASTRODUODENOSCOPY);  Surgeon: Jessica Casillas MD;  Location: [facility] (2ND FLR);  Service: Endoscopy;  Laterality: N/A;  Push endoscopy    ESOPHAGOGASTRODUODENOSCOPY (EGD) N/A 3/6/2018    Performed by Andres Chatterjee MD at [facility] ENDO (2ND FLR)    ESOPHAGOGASTRODUODENOSCOPY (EGD) N/A 12/8/2017    Performed by Gagan Barger MD at [facility] ENDO (2ND FLR)    ESOPHAGOGASTRODUODENOSCOPY (EGD) N/A 8/17/2017    Performed by Kishore Mills MD at [facility] (2ND FLR)    EXPLORATORY-LAPAROTOMY with small bowel resection  N/A 3/13/2018    Performed by Kenyon Tellez MD at [facility] OR 2ND FLR    HEART CATH-RIGHT Right 7/3/2017    Performed by Angie Torres MD at [facility] CATH LAB    INSERTION-LEFT VENTRICULAR ASSIST DEVICE N/A 7/27/2017    Performed by Sunny Downing MD at [facility] OR 2ND FLR    INSERTION-LEFT VENTRICULAR ASSIST DEVICE N/A 7/25/2017    Performed by Sunny Downing MD at [facility] OR 2ND FLR    LEFT VENTRICULAR ASSIST DEVICE  07/27/2017    PLACEMENT-NEOPERICARDIUM N/A 7/28/2017    Performed by Sunny Downing MD at [facility] OR 2ND FLR    RESECTION-BOWEL  3/13/2018    Performed by Kenyon Tellez MD at [facility] OR [facility]    Retrograde deivce assisted enteroscopy N/A 1/26/2018    Performed by Jesse Davis MD at [facility] (2ND FLR)    REVISION-LEAD-ICD N/A 11/20/2017    Performed by Rubén Winchester MD at [facility] CATH LAB    TONSILLECTOMY      TRANSESOPHAGEAL ECHOCARDIOGRAM (GLENN) N/A 1/9/2018    Performed by [facility] Diagnostic Provider at [facility] CATH LAB       Review of patient's allergies indicates:   Allergen Reactions    Asa [aspirin] Other (See Comments)     Bleeding ulcer       Current Facility-Administered Medications   Medication    [START ON 11/24/2018] amiodarone tablet 400 mg    ceFEPIme injection 2 g    dronabinol capsule 5 mg    ferrous sulfate EC tablet 325 mg    [START ON 11/24/2018]  lisinopril tablet 5 mg    pantoprazole EC tablet 40 mg    pravastatin tablet 20 mg    sodium chloride 0.9% bolus 2,343 mL    [START ON 11/24/2018] spironolactone tablet 25 mg    vancomycin 1.5 g in 5 % dextrose 250 mL IVPB    warfarin tablet 3 mg     Current Outpatient Medications   Medication Sig    amiodarone (PACERONE) 200 MG Tab Take 2 tablets (400 mg total) by mouth once daily.    dronabinol (MARINOL) 5 MG capsule TAKE ONE CAPSULE BY MOUTH 3 TIMES DAILY BEFORE MEALS    ferrous sulfate 324 mg (65 mg iron) TbEC Take 1 tablet (325 mg total) by mouth 3 (three) times daily.    lisinopril (PRINIVIL,ZESTRIL) 5 MG tablet Take 1 tablet (5 mg total) by mouth once daily.    pantoprazole (PROTONIX) 40 MG tablet Take 1 tablet (40 mg total) by mouth 2 (two) times daily.    pravastatin (PRAVACHOL) 20 MG tablet Take 1 tablet (20 mg total) by mouth every evening.    spironolactone (ALDACTONE) 25 MG tablet Take 1 tablet (25 mg total) by mouth once daily.    warfarin (COUMADIN) 2 MG tablet Take 3mg (1 & 1/2) tabs on 11/2 only, followed by 2mg (1 tab) orally daily thereafter.    mirtazapine (REMERON) 7.5 MG Tab Take 1 tablet (7.5 mg total) by mouth nightly.    polyethylene glycol (GLYCOLAX) 17 gram PwPk Take 17 g by mouth daily as needed (constipation).     Family History     Problem Relation (Age of Onset)    Heart disease Mother, Father        Tobacco Use    Smoking status: Never Smoker    Smokeless tobacco: Never Used   Substance and Sexual Activity    Alcohol use: No    Drug use: No    Sexual activity: Not Currently     Review of Systems   Constitutional: Positive for chills and fever. Negative for activity change, appetite change and diaphoresis.   HENT: Negative for congestion.    Respiratory: Negative for cough, chest tightness and shortness of breath.    Cardiovascular: Negative for chest pain.   Gastrointestinal: Negative for abdominal distention, abdominal pain, diarrhea, nausea and vomiting.    Genitourinary: Negative for difficulty urinating.   Musculoskeletal: Negative for arthralgias.   Neurological: Negative for tremors, syncope and speech difficulty.   Psychiatric/Behavioral: Negative for agitation.     Objective:     Vital Signs (Most Recent):  Temp: (!) 101 °F (38.3 °C) (11/23/18 1519)  Pulse: 78 (11/23/18 1519)  Resp: 20 (11/23/18 1519)  BP: (!) 72/36 (11/23/18 1519)  SpO2: (not registering) (11/23/18 1519) Vital Signs (24h Range):  Temp:  [99.2 °F (37.3 °C)-101 °F (38.3 °C)] 101 °F (38.3 °C)  Pulse:  [78] 78  Resp:  [20] 20  BP: (72-77)/(0-36) 72/36     Patient Vitals for the past 72 hrs (Last 3 readings):   Weight   11/23/18 1519 78.1 kg (172 lb 3.2 oz)     Body mass index is 25.43 kg/m².    No intake or output data in the 24 hours ending 11/23/18 1558    Physical Exam   Constitutional: He appears well-developed and well-nourished.   HENT:   Head: Normocephalic.   Eyes: Pupils are equal, round, and reactive to light.   Neck: Normal range of motion. No JVD present.   Cardiovascular: Normal rate.   No murmur heard.  Pulmonary/Chest: Effort normal. No stridor. No respiratory distress.   Abdominal: Soft. He exhibits no distension. There is no tenderness.   Musculoskeletal: Normal range of motion. He exhibits no edema.   Neurological: He is alert.   Skin: Skin is warm. He is not diaphoretic. There is erythema.       Significant Labs:  CBC:  Recent Labs   Lab 11/23/18  1426   WBC 23.73*   RBC 3.93*   HGB 9.6*   HCT 32.2*      MCV 82   MCH 24.4*   MCHC 29.8*     BNP:  Recent Labs   Lab 11/23/18  1426   BNP 1,873*     CMP:  Recent Labs   Lab 11/19/18  1139 11/23/18  1426   GLU 84 103   CALCIUM 9.2 8.7   ALBUMIN  --  2.8*   PROT  --  6.4    136   K 3.8 3.6   CO2 25 21*    105   BUN 10 23   CREATININE 1.2 1.6*   ALKPHOS  --  85   ALT  --  101*   AST  --  190*   BILITOT  --  2.4*      Coagulation:   Recent Labs   Lab 11/19/18  1139 11/21/18  1007 11/23/18  1426   INR 3.7* 2.8* 2.0*      LDH:  Recent Labs   Lab 11/23/18  1426   *     Microbiology:  Microbiology Results (last 7 days)     Procedure Component Value Units Date/Time    Blood culture x two cultures. Draw prior to antibiotics. [[id number]]     Order Status:  No result Specimen:  Blood     Blood culture x two cultures. Draw prior to antibiotics. [[id number]]     Order Status:  No result Specimen:  Blood           I have reviewed all pertinent labs within the past 24 hours.    Diagnostic Results:  I have reviewed and interpreted all pertinent imaging results/findings within the past 24 hours.

## 2018-11-23 NOTE — ASSESSMENT & PLAN NOTE
-worsening DLES infection with fever 102  - Send blood culutres  - ID consult, will start broad spectrum abx with vanc and cefepime (given one dose of each in ED already)  -will get imaging with CT to look for further infection, abscess

## 2018-11-24 PROBLEM — N49.2 SCROTUM, ABSCESS: Status: ACTIVE | Noted: 2018-01-01

## 2018-11-24 NOTE — NURSING
Notified Dr. Torres with HTS of results of CXR and CT abd/pelvis. No orders received at this time.  Will continue to monitor.

## 2018-11-24 NOTE — NURSING
Dr. Mireles at bedside for interrogation of device. Pt A-paced per interrogation. Pulse and BPobtained with doppler. VS stable. Unable to obtain data directly from monitor d/t pt low pulsatility, BS obtained manually by nurse.

## 2018-11-24 NOTE — PROGRESS NOTES
11/24/2018  Baljinder Jones    Current provider:  Carly Porras MD      I, Baljinder Jones, rounded on Suman Hayden to ensure all mechanical assist device settings (IABP or VAD) were appropriate and all parameters were within limits.  I was able to ensure all back up equipment was present, the staff had no issues, and the Perfusion Department daily rounding was complete.    5:34 PM

## 2018-11-24 NOTE — ASSESSMENT & PLAN NOTE
-pt presently initially dry with sepsis, s/p IVF hydration, labs notable for elevated BNP, CXR and CT show left pleural effusion, pt saturating well on 1 Liter oxygen via NC   -c/w home lisinopril, holding home aldactone b/c of elevated serum Cr. Will continue to monitor Cr and then dc lisinopril if worsens

## 2018-11-24 NOTE — ASSESSMENT & PLAN NOTE
-pt with hx of VT, follows with EP, on Amiodarone at home (200 mg QD, which is being continued here)   -pt has future plans for possible VT ablation with EP

## 2018-11-24 NOTE — PROGRESS NOTES
[facility]  Heart Transplant  Progress Note    Patient Name: Suman Hayden  MRN: [id number]  Admission Date: 11/23/2018  Hospital Length of Stay: 1 days  Attending Physician: Angie Torres MD  Primary Care Provider: Joe Ernst MD  Principal Problem:Severe sepsis    Subjective:     Interval History:  Admitted overnight. No abscess seen. ID consulted and currently on broad spectrum abx for potential DL infection, UTI, cellulitis. Blood cultures drawn and awaiting results.    Noted to have a WCT on EKG. Interrogated the device and the patient is A-paced VS rhythm.     Will await consults from ID and derm.     Gave one dose of lasix today.    Continuous Infusions:  Scheduled Meds:   amiodarone  400 mg Oral Daily    ceFEPime (MAXIPIME) IVPB  1 g Intravenous Q8H    dronabinol  5 mg Oral BID    ferrous sulfate  325 mg Oral TID    lisinopril  5 mg Oral Daily    pantoprazole  40 mg Oral BID    vancomycin (VANCOCIN) IVPB  1,000 mg Intravenous Q12H    warfarin  3 mg Oral Daily     PRN Meds:acetaminophen    Review of patient's allergies indicates:   Allergen Reactions    Asa [aspirin] Other (See Comments)     Bleeding ulcer     Objective:     Vital Signs (Most Recent):  Temp: (!) 100.6 °F (38.1 °C) (11/24/18 0911)  Pulse: 79 (11/24/18 0911)  Resp: 10 (11/24/18 0911)  BP: (!) 74/0 (11/24/18 0911)  SpO2: 98 % (11/24/18 0800) Vital Signs (24h Range):  Temp:  [98.4 °F (36.9 °C)-103.1 °F (39.5 °C)] 100.6 °F (38.1 °C)  Pulse:  [] 79  Resp:  [7-29] 10  SpO2:  [92 %-100 %] 98 %  BP: (60-77)/(0-36) 74/0     Patient Vitals for the past 72 hrs (Last 3 readings):   Weight   11/24/18 0911 78.5 kg (173 lb 1 oz)   11/24/18 0910 78.5 kg (173 lb 1 oz)   11/23/18 2100 78.8 kg (173 lb 11.6 oz)     Body mass index is 25.56 kg/m².      Intake/Output Summary (Last 24 hours) at 11/24/2018 0912  Last data filed at 11/24/2018 0900  Gross per 24 hour   Intake 1125 ml   Output 250 ml   Net 875 ml        Hemodynamic Parameters:       Telemetry:     Physical Exam   Constitutional: He is oriented to person, place, and time. No distress.   HENT:   Mouth/Throat: Oropharynx is clear and moist.   Eyes: Pupils are equal, round, and reactive to light.   Neck: Neck supple. JVD present.   Cardiovascular:   Normal VAD hum    Pulmonary/Chest: Effort normal.   Abdominal: Soft.   Tenderness to palpation of the umbilicus area, no rebound/guarding, normoactive bowel sounds   Musculoskeletal: He exhibits no edema.   Neurological: He is alert and oriented to person, place, and time. No cranial nerve deficit.   Skin: Skin is warm and dry.   Psychiatric: He has a normal mood and affect. His behavior is normal.       Significant Labs:  CBC:  Recent Labs   Lab 11/23/18  1426 11/23/18  1612 11/24/18  0346   WBC 23.73* 22.36* 20.64*   RBC 3.93* 3.90* 3.53*   HGB 9.6* 9.6* 8.4*   HCT 32.2* 31.7* 29.0*    175 143*   MCV 82 81* 82   MCH 24.4* 24.6* 23.8*   MCHC 29.8* 30.3* 29.0*     BNP:  Recent Labs   Lab 11/23/18  1426 11/23/18  1612   BNP 1,873* 1,204*     CMP:  Recent Labs   Lab 11/23/18  1426 11/23/18  1817 11/23/18  2309 11/24/18  0346    151*  --  81   CALCIUM 8.7 8.2*  --  8.0*   ALBUMIN 2.8* 2.6* 2.3*  --    PROT 6.4 5.9* 5.1*  --     133*  --  129*   K 3.6 3.3*  --  4.9   CO2 21* 20*  --  14*    104  --  105   BUN 23 23  --  27*   CREATININE 1.6* 1.4  --  1.4   ALKPHOS 85 65 61  --    * 100* 91*  --    * 178* 151*  --    BILITOT 2.4* 2.2* 1.8*  --       Coagulation:   Recent Labs   Lab 11/21/18  1007 11/23/18  1426 11/24/18  0346   INR 2.8* 2.0* 2.4*   APTT  --   --  32.5*     LDH:  Recent Labs   Lab 11/23/18  1426 11/24/18  0346   * 257     Microbiology:  Microbiology Results (last 7 days)     Procedure Component Value Units Date/Time    Blood culture x two cultures. Draw prior to antibiotics. [[id number]] Collected:  11/23/18 1613    Order Status:  Completed Specimen:  Blood from  Peripheral, Hand, Left Updated:  11/24/18 0115     Blood Culture, Routine No Growth to date    Narrative:       Aerobic and anaerobic    Blood culture x two cultures. Draw prior to antibiotics. [[id number]] Collected:  11/23/18 1613    Order Status:  Completed Specimen:  Blood from Peripheral, Antecubital, Left Updated:  11/24/18 0115     Blood Culture, Routine No Growth to date    Narrative:       Aerobic and anaerobic          I have reviewed all pertinent labs within the past 24 hours.    Estimated Creatinine Clearance: 50.5 mL/min (based on SCr of 1.4 mg/dL).    Diagnostic Results:  I have reviewed all pertinent imaging results/findings within the past 24 hours.    Assessment and Plan:     68 year old male with PMHx significant for HFrEF secondary to non ischemic cardiomyopathy underwent Heartmate  3 LVAD implanted as DT therapy 7/27/17 and VT on amiodarone s/p ICD (with revision in 11/2017 complicated by pocket hematoma). Presents to ED today from clinic for worsening DLES infection and fever. Patient seen by VAD nurse and ID staff who feel admission to [facility] with IV antibiotics warranted. Patient also with fevers, chills, decreased appetite, scrotal boils with spontaneous purulent discharge/rupture, urinary incontinence and confusion at home. History provided by patient and supplemented by his wife. He denies recent travel or being around sick contacts. He is complaint with taking his home medications. Of note, he was recently hospitalized for GIB, history of ileal ulcer s/p intervention. Being followed also by EP by Dr. Winchester for possible future VT ablation. Pt denies recent ICD shocks as well as low flow alarms from his VAD.  In the ER, he was found febrile with Tmax of 103 deg F, doppler BP 60-70's and HR 70s. He was given 2 Liters NS IVFs, cefepime/vancomycin and resumed on home meds except aldactone. Blood cultures sent as well as gram stain and culture of specimen from drive line. CXR showed left small  pleural effusion and CT abdomen performed that showed small volume fluid inferior aspect of LVAD along with small pericardial effusion. Labs notable for leukocytosis, mild INDIRA, elevated AST/ALT/T.bili, BNP and lactate 2.7 as well as CRP of 215. LDH was 294. VAD was interrogated and noted to have PI events. Pt was admitted to the ICU for further management/care.     * Severe sepsis    -pt presented with fever, found to have leukocytosis, elevated lactate/CRP and mild INDIRA  -sources potentially: drive line infection (purulent discharge- sampled already and sent to lab; GS showed rare WBCs, CT abdomen showed small volume of fluid inferior aspect of VAD) vs skin infection (scrotal area with boils) vs UTI (pt with frequency, concentrated/dark urine)   -c/w broad spectrum antibiotics (Vancomycin/Cefepime)   -f/u BCx, lactate is wnl, procalc is elevated.  -ID consulted for rec's on antibiotic selection/management  -Derm consulted for management of scrotal skin issue  -Tylenol PRN (low dose, given elevated LFT's), Ice packs PRN      Transaminitis    -likely due to sepsis vs effects of amiodarone  -will trend LFT's   -CT abdomen: liver is normal in size and attenuation without focal hepatic lesion.  There is gallbladder sludge.  The spleen, adrenal glands, and pancreas are unremarkable.   -holding home pravastatin     Infection, skin    -worsening DLES infection with fever 102  - Send blood culutres  - ID consult, will start broad spectrum abx with vanc and cefepime (given one dose of each in ED already)  -will get imaging with CT to look for further infection, abscess      INDIRA (acute kidney injury)    -mild elevation in serum Cr (1.4, from baseline 1.2)  -holding home aldactone, s/p IVF's (2L NS)   -encourage PO fluid intake, no more IVF's   -bladder scan q6hrs, PRN ISC for PVR >300 cc       Iron deficiency anemia due to chronic blood loss    -c/w home oral iron supplementation tablets     LVAD (left ventricular assist  device) present    -HM3 5200 rpm, DT therapy  - One dose of lasix IV 20 mg today  -INR therapeutic (2), Continue coumadin for goal INR 2-3, off aspirin due to multiple GI bleeds (no heparin gtt) No bridge  -    Procedure: Device Interrogation Including analysis of device parameters  Current Settings: Ventricular Assist Device  Review of device function is stable/unstable stable    TXP LVAD INTERROGATIONS 11/24/2018 11/24/2018 11/24/2018 11/24/2018 11/24/2018 11/24/2018 11/24/2018   Type HeartMate3 HeartMate3 HeartMate3 HeartMate3 HeartMate3 HeartMate II HeartMate3   Flow 4.5 4.4 4.6 4.2 4.2  4.1  4.2    Speed 5200 5200 5200 5100 5200 4900 5200   PI 2.6 2.7 1.8 2.8 1.9 2.8 1.9   Power ([place]) 3.5 3.7 3.5 3.4 3.5 3.2 3.5   LSL - - - - - - -   Pulsatility Pulse Pulse Pulse Intermittent pulse Intermittent pulse Intermittent pulse Intermittent pulse        Chronic combined systolic and diastolic congestive heart failure    -pt presently initially dry with sepsis, s/p IVF hydration, labs notable for elevated BNP, CXR and CT show left pleural effusion, pt saturating well on 1 Liter oxygen via NC   -c/w home lisinopril, holding home aldactone b/c of elevated serum Cr. Will continue to monitor Cr and then dc lisinopril if worsens     VT (ventricular tachycardia)    -pt with hx of VT, follows with EP, on Amiodarone at home (200 mg QD, which is being continued here)   -pt has future plans for possible VT ablation with EP  - Not in VT right now after ICD was interrogated. Looks like slow AFL at rates of 78.       Essential hypertension    -c/w home lisinopril, currently holding home aldactone  -goal MAP less than 90         Paige Mireles MD  Heart Transplant  [facility]

## 2018-11-24 NOTE — ASSESSMENT & PLAN NOTE
-likely due to sepsis vs effects of amiodarone  -will trend LFT's   -CT abdomen: liver is normal in size and attenuation without focal hepatic lesion.  There is gallbladder sludge.  The spleen, adrenal glands, and pancreas are unremarkable.   -holding home pravastatin

## 2018-11-24 NOTE — PLAN OF CARE
Problem: Fall Risk (Adult)  Goal: Identify Related Risk Factors and Signs and Symptoms  Related risk factors and signs and symptoms are identified upon initiation of Human Response Clinical Practice Guideline (CPG)  Outcome: Ongoing (interventions implemented as appropriate)  No falls noted during shift. Safety maintained. FPP in place.

## 2018-11-24 NOTE — CONSULTS
"[facility]  Dermatology  Consult Note    Patient Name: Suman Hayden  MRN: [id number]  Admission Date: 11/23/2018  Hospital Length of Stay: 1 days  Attending Physician: Carly Porras MD  Primary Care Provider: Joe Ernst MD     Inpatient consult to Dermatology  Consult performed by: Savannah Sandhu MD  Consult ordered by: Stella Thornton MD        Subjective:     Principal Problem:Severe sepsis    HPI:  Mr. Hayden is a 68 year old man with heart failure secondary to non ischemic cardiomyopathy, now with LVAD placement. Patient admitted for sepsis likely secondary to infected drive line. Dermatology consulted for scrotal abscesses.     Per patient and wife, patient has had "ingrown hairs" of the scrotum in the past. However, they have never been bothersome and have never gotten infected. On Monday, patient states he started to notice 3 lesions enlarging on his scrotum. They became red, tender, and then began draining pus. A day later, he started to feel febrile. Shortly after, his wife also noticed purulence at the site of his LVAD wire and brought him to the ED. He was admitted for sepsis, likely due to infected drive wire.     Past Medical History:   Diagnosis Date    Arthritis     Cardiomyopathy     CHF (congestive heart failure)     Coronary artery disease     Encounter for blood transfusion     Gastric polyp     Hyperlipidemia     Hypertension     Hypotension, iatrogenic     Iron deficiency anemia due to chronic blood loss 10/15/2018    LVAD (left ventricular assist device) present     Obesity     S/P implantation of automatic cardioverter/defibrillator (AICD)     Ventricular tachycardia        Past Surgical History:   Procedure Laterality Date    ABDOMINAL SURGERY      APPENDECTOMY      CARDIAC CATHETERIZATION      CARDIAC DEFIBRILLATOR PLACEMENT      CARDIAC DEFIBRILLATOR PLACEMENT      CLOSURE-STERNAL WOUND N/A 7/28/2017    Performed by Sunny Downing MD " at [facility] OR 2ND FLR    COLONOSCOPY      COLONOSCOPY N/A 3/9/2018    Procedure: COLONOSCOPY;  Surgeon: Andres Chatterjee MD;  Location: [facility] (2ND FLR);  Service: Endoscopy;  Laterality: N/A;    COLONOSCOPY N/A 8/8/2018    Procedure: COLONOSCOPY;  Surgeon: Andres Chatterjee MD;  Location: [facility] (2ND FLR);  Service: Endoscopy;  Laterality: N/A;    COLONOSCOPY N/A 8/8/2018    Performed by Andres Chatterjee MD at [facility] ENDO (2ND FLR)    COLONOSCOPY N/A 3/9/2018    Performed by Andres Chatterjee MD at [facility] (2ND FLR)    DEVICE ASSISTED ENTEROSCOPY-ANTEROGRADE N/A 1/25/2018    Performed by Andres Chatterjee MD at [facility] (2ND FLR)    DEVICE ASSISTED ENTEROSCOPY-ANTEROGRADE N/A 12/14/2017    Performed by Andres Chatterjee MD at [facility] (2ND FLR)    EGD (ESOPHAGOGASTRODUODENOSCOPY) N/A 10/26/2018    Performed by Jessica Casillas MD at [facility] (2ND FLR)    EGD (ESOPHAGOGASTRODUODENOSCOPY) N/A 8/8/2018    Performed by Andres Chatterjee MD at [facility] (2ND FLR)    ESOPHAGOGASTRODUODENOSCOPY      ESOPHAGOGASTRODUODENOSCOPY N/A 8/8/2018    Procedure: EGD (ESOPHAGOGASTRODUODENOSCOPY);  Surgeon: Andres Chatterjee MD;  Location: [facility] (2ND FLR);  Service: Endoscopy;  Laterality: N/A;    ESOPHAGOGASTRODUODENOSCOPY N/A 10/26/2018    Procedure: EGD (ESOPHAGOGASTRODUODENOSCOPY);  Surgeon: Jessica Casillas MD;  Location: [facility] (2ND FLR);  Service: Endoscopy;  Laterality: N/A;  Push endoscopy    ESOPHAGOGASTRODUODENOSCOPY (EGD) N/A 3/6/2018    Performed by Andres Chatterjee MD at [facility] ENDO (2ND FLR)    ESOPHAGOGASTRODUODENOSCOPY (EGD) N/A 12/8/2017    Performed by Gagan Barger MD at [facility] ENDO (2ND FLR)    ESOPHAGOGASTRODUODENOSCOPY (EGD) N/A 8/17/2017    Performed by Kishore Mills MD at [facility] ENDO (2ND FLR)    EXPLORATORY-LAPAROTOMY with small bowel resection  N/A 3/13/2018    Performed by Kenyon Tellez MD at [facility] OR 2ND FLR    HEART CATH-RIGHT Right 7/3/2017    Performed by Angie Torres,  "MD at [facility] CATH LAB    INSERTION-LEFT VENTRICULAR ASSIST DEVICE N/A 7/27/2017    Performed by Sunny Downing MD at [facility] OR 2ND FLR    INSERTION-LEFT VENTRICULAR ASSIST DEVICE N/A 7/25/2017    Performed by Sunny Downing MD at [facility] OR 2ND FLR    LEFT VENTRICULAR ASSIST DEVICE  07/27/2017    PLACEMENT-NEOPERICARDIUM N/A 7/28/2017    Performed by Sunny Downing MD at [facility] OR 2ND FLR    RESECTION-BOWEL  3/13/2018    Performed by Kenyon Tellez MD at [facility] OR 2ND FLR    Retrograde deivce assisted enteroscopy N/A 1/26/2018    Performed by Jesse Davis MD at [facility] ENDO (2ND FLR)    REVISION-LEAD-ICD N/A 11/20/2017    Performed by Rubén Winchester MD at [facility] CATH LAB    TONSILLECTOMY      TRANSESOPHAGEAL ECHOCARDIOGRAM (GLENN) N/A 1/9/2018    Performed by [facility] Diagnostic Provider at [facility] CATH LAB     Family History     Problem Relation (Age of Onset)    Heart disease Mother, Father        Tobacco Use    Smoking status: Never Smoker    Smokeless tobacco: Never Used   Substance and Sexual Activity    Alcohol use: No    Drug use: No    Sexual activity: Not Currently       Review of patient's allergies indicates:   Allergen Reactions    Asa [aspirin] Other (See Comments)     Bleeding ulcer       Medications:  Continuous Infusions:  Scheduled Meds:   amiodarone  400 mg Oral Daily    ceFEPime (MAXIPIME) IVPB  1 g Intravenous Q8H    dronabinol  5 mg Oral BID    ferrous sulfate  325 mg Oral TID    lisinopril  5 mg Oral Daily    pantoprazole  40 mg Oral BID    vancomycin (VANCOCIN) IVPB  1,000 mg Intravenous Q12H    warfarin  3 mg Oral Daily     PRN Meds:acetaminophen    Review of Systems   Constitutional: Positive for fever, chills and malaise.   HENT: Negative for mouth sores.    Gastrointestinal: Negative for abdominal pain.   Genitourinary: Positive for genital sores ( Scrotal "boils"). Negative for dysuria and genital itching.   Skin: Positive for abscesses ( scrotal "boils"). Negative for " itching.     Objective:     Vital Signs (Most Recent):  Temp: (!) 102.1 °F (38.9 °C) (11/24/18 1131)  Pulse: 79 (11/24/18 1200)  Resp: (!) 33 (11/24/18 1200)  BP: (!) 58/0 (11/24/18 1200)  SpO2: 96 % (11/24/18 1200) Vital Signs (24h Range):  Temp:  [98.4 °F (36.9 °C)-103.1 °F (39.5 °C)] 102.1 °F (38.9 °C)  Pulse:  [] 79  Resp:  [7-33] 33  SpO2:  [92 %-100 %] 96 %  BP: (58-77)/(0-36) 58/0     Weight: 78.5 kg (173 lb 1 oz)  Body mass index is 25.56 kg/m².    Physical Exam   Constitutional: He appears well-developed.   Neurological: He is alert and oriented to person, place, and time.   Psychiatric: He has a normal mood and affect.   Skin:   Areas Examined (abnormalities noted in diagram):   Head / Face Inspection Performed  Chest / Axilla Inspection Performed  Abdomen Inspection Performed  Genitals / Buttocks / Groin Inspection Performed  RUE Inspected  LUE Inspection Performed  RLE Inspected  LLE Inspection Performed           Significant Labs:   Blood Culture:   Recent Labs   Lab 11/23/18  1613   LABBLOO Gram stain clement bottle: Gram positive cocci in clusters resembling Staph  Results called to and read back by: Sharlene Landon RN 11/24/2018  09:36  Gram stain clement bottle: Gram positive cocci in clusters resembling Staph  Results called to and read back by: Sharlene Landon RN 11/24/2018  09:35     CBC:   Recent Labs   Lab 11/23/18  1426 11/23/18  1612 11/24/18  0346   WBC 23.73* 22.36* 20.64*   HGB 9.6* 9.6* 8.4*   HCT 32.2* 31.7* 29.0*    175 143*     CMP:   Recent Labs   Lab 11/23/18  1426 11/23/18  1817 11/23/18  2309 11/24/18  0346    133*  --  129*   K 3.6 3.3*  --  4.9    104  --  105   CO2 21* 20*  --  14*    151*  --  81   BUN 23 23  --  27*   CREATININE 1.6* 1.4  --  1.4   CALCIUM 8.7 8.2*  --  8.0*   PROT 6.4 5.9* 5.1*  --    ALBUMIN 2.8* 2.6* 2.3*  --    BILITOT 2.4* 2.2* 1.8*  --    ALKPHOS 85 65 61  --    * 178* 151*  --    * 100* 91*  --    ANIONGAP 10 9  --   10   EGFRNONAA 43.6* 51.3*  --  51.3*       Significant Imaging: None    Assessment/Plan:     Scrotum, abscess    Patient with 3 draining scrotal abscesses. Likely started as epidermal inclusion cysts (which are common on the scrotum) that became secondarily infected. Unlikely primary source for patient's bacteremia. Per patient, lesions have improved significantly since starting antibiotics.   - Lesions are already draining, no need for further I&D today   - Continue abx to cover staph   - Start Mupirocin ointment daily to the lesions   - Discussed diagnosis with patient. After secondary infection resolves, cysts will still remain. The only treatment for EICs is excision, though excision is not necessary if lesions do not bother patient. Patient can decide on further treatment when infection has resolved.            Thank you for your consult. I will sign off. Please contact us if you have any additional questions.

## 2018-11-24 NOTE — SUBJECTIVE & OBJECTIVE
Past Medical History:   Diagnosis Date    Arthritis     Cardiomyopathy     CHF (congestive heart failure)     Coronary artery disease     Encounter for blood transfusion     Gastric polyp     Hyperlipidemia     Hypertension     Hypotension, iatrogenic     Iron deficiency anemia due to chronic blood loss 10/15/2018    LVAD (left ventricular assist device) present     Obesity     S/P implantation of automatic cardioverter/defibrillator (AICD)     Ventricular tachycardia        Past Surgical History:   Procedure Laterality Date    ABDOMINAL SURGERY      APPENDECTOMY      CARDIAC CATHETERIZATION      CARDIAC DEFIBRILLATOR PLACEMENT      CARDIAC DEFIBRILLATOR PLACEMENT      CLOSURE-STERNAL WOUND N/A 7/28/2017    Performed by Sunny Downing MD at [facility] OR 2ND FLR    COLONOSCOPY      COLONOSCOPY N/A 3/9/2018    Procedure: COLONOSCOPY;  Surgeon: Andres Chatterjee MD;  Location: [facility] (2ND FLR);  Service: Endoscopy;  Laterality: N/A;    COLONOSCOPY N/A 8/8/2018    Procedure: COLONOSCOPY;  Surgeon: Andres Chatterjee MD;  Location: [facility] (2ND FLR);  Service: Endoscopy;  Laterality: N/A;    COLONOSCOPY N/A 8/8/2018    Performed by Andres Chatterjee MD at [facility] ENDO (2ND FLR)    COLONOSCOPY N/A 3/9/2018    Performed by Andres Chatterjee MD at [facility] (2ND FLR)    DEVICE ASSISTED ENTEROSCOPY-ANTEROGRADE N/A 1/25/2018    Performed by Andres Chatterjee MD at [facility] ENDO (2ND FLR)    DEVICE ASSISTED ENTEROSCOPY-ANTEROGRADE N/A 12/14/2017    Performed by Andres Chatterjee MD at [facility] (2ND FLR)    EGD (ESOPHAGOGASTRODUODENOSCOPY) N/A 10/26/2018    Performed by Jessica Casillas MD at [facility] ENDO (2ND FLR)    EGD (ESOPHAGOGASTRODUODENOSCOPY) N/A 8/8/2018    Performed by Andres Chatterjee MD at [facility] (2ND FLR)    ESOPHAGOGASTRODUODENOSCOPY      ESOPHAGOGASTRODUODENOSCOPY N/A 8/8/2018    Procedure: EGD (ESOPHAGOGASTRODUODENOSCOPY);  Surgeon: Andres Chatterjee MD;  Location: [facility] (2ND FLR);  Service:  Endoscopy;  Laterality: N/A;    ESOPHAGOGASTRODUODENOSCOPY N/A 10/26/2018    Procedure: EGD (ESOPHAGOGASTRODUODENOSCOPY);  Surgeon: Jessica Casillas MD;  Location: [facility] (2ND FLR);  Service: Endoscopy;  Laterality: N/A;  Push endoscopy    ESOPHAGOGASTRODUODENOSCOPY (EGD) N/A 3/6/2018    Performed by Andres Chatterjee MD at [facility] ENDO (2ND FLR)    ESOPHAGOGASTRODUODENOSCOPY (EGD) N/A 12/8/2017    Performed by Gagan Barger MD at [facility] ENDO (2ND FLR)    ESOPHAGOGASTRODUODENOSCOPY (EGD) N/A 8/17/2017    Performed by Kishore Mills MD at [facility] (2ND FLR)    EXPLORATORY-LAPAROTOMY with small bowel resection  N/A 3/13/2018    Performed by Kenyon Tellez MD at [facility] OR 2ND FLR    HEART CATH-RIGHT Right 7/3/2017    Performed by Angie Torres MD at [facility] CATH LAB    INSERTION-LEFT VENTRICULAR ASSIST DEVICE N/A 7/27/2017    Performed by Sunny Downing MD at [facility] OR 2ND FLR    INSERTION-LEFT VENTRICULAR ASSIST DEVICE N/A 7/25/2017    Performed by Sunny Downing MD at [facility] OR 2ND FLR    LEFT VENTRICULAR ASSIST DEVICE  07/27/2017    PLACEMENT-NEOPERICARDIUM N/A 7/28/2017    Performed by Sunny Downing MD at [facility] OR 2ND FLR    RESECTION-BOWEL  3/13/2018    Performed by Kenyon Tellez MD at [facility] OR [facility] F[facility]    Retrograde deivce assisted enteroscopy N/A 1/26/2018    Performed by Jesse Davis MD at [facility] (2ND FLR)    REVISION-LEAD-ICD N/A 11/20/2017    Performed by Rubén Winchester MD at [facility] CATH LAB    TONSILLECTOMY      TRANSESOPHAGEAL ECHOCARDIOGRAM (GLENN) N/A 1/9/2018    Performed by [facility] Diagnostic Provider at [facility] CATH LAB     Family History     Problem Relation (Age of Onset)    Heart disease Mother, Father        Tobacco Use    Smoking status: Never Smoker    Smokeless tobacco: Never Used   Substance and Sexual Activity    Alcohol use: No    Drug use: No    Sexual activity: Not Currently       Review of patient's allergies indicates:   Allergen Reactions    Asa  "[aspirin] Other (See Comments)     Bleeding ulcer       Medications:  Continuous Infusions:  Scheduled Meds:   amiodarone  400 mg Oral Daily    ceFEPime (MAXIPIME) IVPB  1 g Intravenous Q8H    dronabinol  5 mg Oral BID    ferrous sulfate  325 mg Oral TID    lisinopril  5 mg Oral Daily    pantoprazole  40 mg Oral BID    vancomycin (VANCOCIN) IVPB  1,000 mg Intravenous Q12H    warfarin  3 mg Oral Daily     PRN Meds:acetaminophen    Review of Systems   Constitutional: Positive for fever, chills and malaise.   HENT: Negative for mouth sores.    Gastrointestinal: Negative for abdominal pain.   Genitourinary: Positive for genital sores ( Scrotal "boils"). Negative for dysuria and genital itching.   Skin: Positive for abscesses ( scrotal "boils"). Negative for itching.     Objective:     Vital Signs (Most Recent):  Temp: (!) 102.1 °F (38.9 °C) (11/24/18 1131)  Pulse: 79 (11/24/18 1200)  Resp: (!) 33 (11/24/18 1200)  BP: (!) 58/0 (11/24/18 1200)  SpO2: 96 % (11/24/18 1200) Vital Signs (24h Range):  Temp:  [98.4 °F (36.9 °C)-103.1 °F (39.5 °C)] 102.1 °F (38.9 °C)  Pulse:  [] 79  Resp:  [7-33] 33  SpO2:  [92 %-100 %] 96 %  BP: (58-77)/(0-36) 58/0     Weight: 78.5 kg (173 lb 1 oz)  Body mass index is 25.56 kg/m².    Physical Exam   Constitutional: He appears well-developed.   Neurological: He is alert and oriented to person, place, and time.   Psychiatric: He has a normal mood and affect.   Skin:   Areas Examined (abnormalities noted in diagram):   Head / Face Inspection Performed  Chest / Axilla Inspection Performed  Abdomen Inspection Performed  Genitals / Buttocks / Groin Inspection Performed  RUE Inspected  LUE Inspection Performed  RLE Inspected  LLE Inspection Performed           Significant Labs:   Blood Culture:   Recent Labs   Lab 11/23/18  1613   LABBLOO Gram stain clement bottle: Gram positive cocci in clusters resembling Staph  Results called to and read back by: Sharlene Landon RN 11/24/2018  09:36  Gram " stain clement bottle: Gram positive cocci in clusters resembling Staph  Results called to and read back by: Sharlene Landon RN 11/24/2018  09:35     CBC:   Recent Labs   Lab 11/23/18  1426 11/23/18  1612 11/24/18  0346   WBC 23.73* 22.36* 20.64*   HGB 9.6* 9.6* 8.4*   HCT 32.2* 31.7* 29.0*    175 143*     CMP:   Recent Labs   Lab 11/23/18  1426 11/23/18  1817 11/23/18  2309 11/24/18  0346    133*  --  129*   K 3.6 3.3*  --  4.9    104  --  105   CO2 21* 20*  --  14*    151*  --  81   BUN 23 23  --  27*   CREATININE 1.6* 1.4  --  1.4   CALCIUM 8.7 8.2*  --  8.0*   PROT 6.4 5.9* 5.1*  --    ALBUMIN 2.8* 2.6* 2.3*  --    BILITOT 2.4* 2.2* 1.8*  --    ALKPHOS 85 65 61  --    * 178* 151*  --    * 100* 91*  --    ANIONGAP 10 9  --  10   EGFRNONAA 43.6* 51.3*  --  51.3*       Significant Imaging: None

## 2018-11-24 NOTE — ASSESSMENT & PLAN NOTE
-pt presented with fever, found to have leukocytosis, elevated lactate/CRP and mild INDIRA  -sources potentially: drive line infection (purulent discharge- sampled already and sent to lab; GS showed rare WBCs, CT abdomen showed small volume of fluid inferior aspect of VAD) vs skin infection (scrotal area with boils) vs UTI (pt with frequency, concentrated/dark urine)   -c/w broad spectrum antibiotics (Vancomycin/Cefepime)   -f/u BCx, repeat lactate level, pending pro-calc and UA with reflex to UCx   -ID consulted for rec's on antibiotic selection/management  -Derm consulted for management of scrotal skin issue  -Tylenol PRN (low dose, given elevated LFT's), Ice packs PRN

## 2018-11-24 NOTE — HPI
"Mr. Hayden is a 68 year old man with heart failure secondary to non ischemic cardiomyopathy, now with LVAD placement. Patient admitted for sepsis likely secondary to infected drive line. Dermatology consulted for scrotal abscesses.     Per patient and wife, patient has had "ingrown hairs" of the scrotum in the past. However, they have never been bothersome and have never gotten infected. On Monday, patient states he started to notice 3 lesions enlarging on his scrotum. They became red, tender, and then began draining pus. A day later, he started to feel febrile. Shortly after, his wife also noticed purulence at the site of his LVAD wire and brought him to the ED. He was admitted for sepsis, likely due to infected drive wire.   "

## 2018-11-24 NOTE — SUBJECTIVE & OBJECTIVE
Interval History:  Admitted overnight. No abscess seen. ID consulted and currently on broad spectrum abx for potential DL infection, UTI?, cellulitis. Blood cultures drawn and awaiting results.    Noted to have a WCT on EKG. Interrogated the device and the patient is A-paced and appears to be in slow flutter. When he saw adria in 07/2018 he was in afl rate controlled as well.     Continuous Infusions:  Scheduled Meds:   amiodarone  400 mg Oral Daily    ceFEPime (MAXIPIME) IVPB  1 g Intravenous Q8H    dronabinol  5 mg Oral BID    ferrous sulfate  325 mg Oral TID    lisinopril  5 mg Oral Daily    pantoprazole  40 mg Oral BID    vancomycin (VANCOCIN) IVPB  1,000 mg Intravenous Q12H    warfarin  3 mg Oral Daily     PRN Meds:acetaminophen    Review of patient's allergies indicates:   Allergen Reactions    Asa [aspirin] Other (See Comments)     Bleeding ulcer     Objective:     Vital Signs (Most Recent):  Temp: (!) 100.6 °F (38.1 °C) (11/24/18 0911)  Pulse: 79 (11/24/18 0911)  Resp: 10 (11/24/18 0911)  BP: (!) 74/0 (11/24/18 0911)  SpO2: 98 % (11/24/18 0800) Vital Signs (24h Range):  Temp:  [98.4 °F (36.9 °C)-103.1 °F (39.5 °C)] 100.6 °F (38.1 °C)  Pulse:  [] 79  Resp:  [7-29] 10  SpO2:  [92 %-100 %] 98 %  BP: (60-77)/(0-36) 74/0     Patient Vitals for the past 72 hrs (Last 3 readings):   Weight   11/24/18 0911 78.5 kg (173 lb 1 oz)   11/24/18 0910 78.5 kg (173 lb 1 oz)   11/23/18 2100 78.8 kg (173 lb 11.6 oz)     Body mass index is 25.56 kg/m².      Intake/Output Summary (Last 24 hours) at 11/24/2018 0912  Last data filed at 11/24/2018 0900  Gross per 24 hour   Intake 1125 ml   Output 250 ml   Net 875 ml       Hemodynamic Parameters:       Telemetry:     Physical Exam   Constitutional: He is oriented to person, place, and time. No distress.   HENT:   Mouth/Throat: Oropharynx is clear and moist.   Eyes: Pupils are equal, round, and reactive to light.   Neck: Neck supple. JVD present.   Cardiovascular:    Normal VAD hum    Pulmonary/Chest: Effort normal.   Abdominal: Soft.   Tenderness to palpation of the umbilicus area, no rebound/guarding, normoactive bowel sounds   Musculoskeletal: He exhibits no edema.   Neurological: He is alert and oriented to person, place, and time. No cranial nerve deficit.   Skin: Skin is warm and dry.   Psychiatric: He has a normal mood and affect. His behavior is normal.       Significant Labs:  CBC:  Recent Labs   Lab 11/23/18  1426 11/23/18  1612 11/24/18  0346   WBC 23.73* 22.36* 20.64*   RBC 3.93* 3.90* 3.53*   HGB 9.6* 9.6* 8.4*   HCT 32.2* 31.7* 29.0*    175 143*   MCV 82 81* 82   MCH 24.4* 24.6* 23.8*   MCHC 29.8* 30.3* 29.0*     BNP:  Recent Labs   Lab 11/23/18  1426 11/23/18  1612   BNP 1,873* 1,204*     CMP:  Recent Labs   Lab 11/23/18  1426 11/23/18  1817 11/23/18  2309 11/24/18  0346    151*  --  81   CALCIUM 8.7 8.2*  --  8.0*   ALBUMIN 2.8* 2.6* 2.3*  --    PROT 6.4 5.9* 5.1*  --     133*  --  129*   K 3.6 3.3*  --  4.9   CO2 21* 20*  --  14*    104  --  105   BUN 23 23  --  27*   CREATININE 1.6* 1.4  --  1.4   ALKPHOS 85 65 61  --    * 100* 91*  --    * 178* 151*  --    BILITOT 2.4* 2.2* 1.8*  --       Coagulation:   Recent Labs   Lab 11/21/18  1007 11/23/18  1426 11/24/18  0346   INR 2.8* 2.0* 2.4*   APTT  --   --  32.5*     LDH:  Recent Labs   Lab 11/23/18  1426 11/24/18  0346   * 257     Microbiology:  Microbiology Results (last 7 days)     Procedure Component Value Units Date/Time    Blood culture x two cultures. Draw prior to antibiotics. [[id number]] Collected:  11/23/18 1613    Order Status:  Completed Specimen:  Blood from Peripheral, Hand, Left Updated:  11/24/18 0115     Blood Culture, Routine No Growth to date    Narrative:       Aerobic and anaerobic    Blood culture x two cultures. Draw prior to antibiotics. [[id number]] Collected:  11/23/18 1613    Order Status:  Completed Specimen:  Blood from Peripheral,  Antecubital, Left Updated:  11/24/18 0115     Blood Culture, Routine No Growth to date    Narrative:       Aerobic and anaerobic          I have reviewed all pertinent labs within the past 24 hours.    Estimated Creatinine Clearance: 50.5 mL/min (based on SCr of 1.4 mg/dL).    Diagnostic Results:  I have reviewed all pertinent imaging results/findings within the past 24 hours.

## 2018-11-24 NOTE — SUBJECTIVE & OBJECTIVE
Past Medical History:   Diagnosis Date    Arthritis     Cardiomyopathy     CHF (congestive heart failure)     Coronary artery disease     Encounter for blood transfusion     Gastric polyp     Hyperlipidemia     Hypertension     Hypotension, iatrogenic     Iron deficiency anemia due to chronic blood loss 10/15/2018    LVAD (left ventricular assist device) present     Obesity     S/P implantation of automatic cardioverter/defibrillator (AICD)     Ventricular tachycardia        Past Surgical History:   Procedure Laterality Date    ABDOMINAL SURGERY      APPENDECTOMY      CARDIAC CATHETERIZATION      CARDIAC DEFIBRILLATOR PLACEMENT      CARDIAC DEFIBRILLATOR PLACEMENT      CLOSURE-STERNAL WOUND N/A 7/28/2017    Performed by Sunny Downing MD at [facility] OR 2ND FLR    COLONOSCOPY      COLONOSCOPY N/A 3/9/2018    Procedure: COLONOSCOPY;  Surgeon: Andres Chatterjee MD;  [facility]  Service: Endoscopy;  Laterality: N/A;    COLONOSCOPY N/A 8/8/2018    Procedure: COLONOSCOPY;  Surgeon: Andres Chatterjee MD;  [facility] ([facility]);  Service: Endoscopy;  Laterality: N/A;    COLONOSCOPY N/A 8/8/2018    Performed by Andres Chatterjee MD at [facility] ENDO (2ND FLR)    COLONOSCOPY N/A 3/9/2018    [facility]    DEVICE ASSISTED ENTEROSCOPY-ANTEROGRADE N/A 1/25/2018    Performed by Andres Chatterjee MD at [facility] ENDO (2ND FLR)    DEVICE ASSISTED ENTEROSCOPY-ANTEROGRADE N/A 12/14/2017    [facility]    EGD (ESOPHAGOGASTRODUODENOSCOPY) N/A 10/26/2018    Performed by Jessica Casillas MD at [facility] ENDO (2ND FLR)    EGD (ESOPHAGOGASTRODUODENOSCOPY) N/A 8/8/2018    [facility]    ESOPHAGOGASTRODUODENOSCOPY      ESOPHAGOGASTRODUODENOSCOPY N/A 8/8/2018    Procedure: EGD (ESOPHAGOGASTRODUODENOSCOPY);  Surgeon: Andres Chatterjee MD;  [facility] ([facility]);  Service:  Endoscopy;  Laterality: N/A;    ESOPHAGOGASTRODUODENOSCOPY N/A 10/26/2018    Procedure: EGD (ESOPHAGOGASTRODUODENOSCOPY);  Surgeon: Jessica Casillas MD;  Location: [facility] (2ND FLR);  Service: Endoscopy;  Laterality: N/A;  Push endoscopy    ESOPHAGOGASTRODUODENOSCOPY (EGD) N/A 3/6/2018    Performed by Andres Chatterjee MD at [facility] ENDO (2ND FLR)    ESOPHAGOGASTRODUODENOSCOPY (EGD) N/A 12/8/2017    Performed by Gagan Barger MD at [facility] ENDO (2ND FLR)    ESOPHAGOGASTRODUODENOSCOPY (EGD) N/A 8/17/2017    Performed by Kishore Mills MD at [facility] (2ND FLR)    EXPLORATORY-LAPAROTOMY with small bowel resection  N/A 3/13/2018    Performed by Kenyon Tellez MD at [facility] OR 2ND FLR    HEART CATH-RIGHT Right 7/3/2017    Performed by Angie Torres MD at [facility] CATH LAB    INSERTION-LEFT VENTRICULAR ASSIST DEVICE N/A 7/27/2017    Performed by Sunny Downing MD at [facility] OR 2ND FLR    INSERTION-LEFT VENTRICULAR ASSIST DEVICE N/A 7/25/2017    Performed by Sunny Downing MD at [facility] OR 2ND FLR    LEFT VENTRICULAR ASSIST DEVICE  07/27/2017    PLACEMENT-NEOPERICARDIUM N/A 7/28/2017    Performed by Sunny Downing MD at [facility] OR 2ND FLR    RESECTION-BOWEL  3/13/2018    Performed by Kenyon Tellez MD at [facility] OR [facility] FLR    Retrograde deivce assisted enteroscopy N/A 1/26/2018    Performed by Jesse Davis MD at [facility] (2ND FLR)    REVISION-LEAD-ICD N/A 11/20/2017    Performed by Rubén Winchester MD at [facility] CATH LAB    TONSILLECTOMY      TRANSESOPHAGEAL ECHOCARDIOGRAM (GLENN) N/A 1/9/2018    Performed by [facility] Diagnostic Provider at [facility] CATH LAB       Review of patient's allergies indicates:   Allergen Reactions    Asa [aspirin] Other (See Comments)     Bleeding ulcer       Current Facility-Administered Medications   Medication    acetaminophen tablet 650 mg    [START ON 11/24/2018] amiodarone tablet 400 mg    ceFEPIme injection 1 g    dronabinol capsule 5 mg    ferrous sulfate EC tablet 325  mg    [START ON 11/24/2018] lisinopril tablet 5 mg    magnesium oxide tablet 800 mg    pantoprazole EC tablet 40 mg    potassium chloride SA CR tablet 60 mEq    pravastatin tablet 20 mg    vancomycin in dextrose 5 % 1 gram/250 mL IVPB 1,000 mg    warfarin tablet 3 mg     Family History     Problem Relation (Age of Onset)    Heart disease Mother, Father        Tobacco Use    Smoking status: Never Smoker    Smokeless tobacco: Never Used   Substance and Sexual Activity    Alcohol use: No    Drug use: No    Sexual activity: Not Currently     Review of Systems   Constitutional: Positive for activity change, appetite change, chills and fatigue.   HENT: Negative for congestion, rhinorrhea and sinus pain.    Eyes: Negative for discharge.   Respiratory: Positive for cough. Negative for chest tightness, shortness of breath and wheezing.    Cardiovascular: Negative for chest pain, palpitations and leg swelling.   Gastrointestinal: Positive for abdominal pain. Negative for abdominal distention, constipation, diarrhea, nausea and vomiting.   Genitourinary: Positive for frequency and urgency. Negative for difficulty urinating, dysuria and flank pain.   Musculoskeletal: Positive for gait problem. Negative for back pain, joint swelling, myalgias and neck pain.   Skin: Positive for rash.   Neurological: Positive for weakness. Negative for dizziness, tremors, seizures, syncope, facial asymmetry, speech difficulty, light-headedness, numbness and headaches.   Psychiatric/Behavioral: Positive for confusion and decreased concentration. Negative for agitation and hallucinations. The patient is not hyperactive.      Objective:     Vital Signs (Most Recent):  Temp: (!) 103.1 °F (39.5 °C) (11/23/18 2132)  Pulse: 68 (11/23/18 2127)  Resp: 15 (11/23/18 2127)  BP: (!) 65/0 (11/23/18 2100)  SpO2: 97 %(hand-held pulsr ox) (11/23/18 2127) Vital Signs (24h Range):  Temp:  [99.2 °F (37.3 °C)-103.1 °F (39.5 °C)] 103.1 °F (39.5 °C)  Pulse:   [68-78] 68  Resp:  [15-20] 15  SpO2:  [97 %-99 %] 97 %  BP: (65-77)/(0-36) 65/0     Patient Vitals for the past 72 hrs (Last 3 readings):   Weight   11/23/18 1519 78.1 kg (172 lb 3.2 oz)     Body mass index is 25.43 kg/m².      Intake/Output Summary (Last 24 hours) at 11/23/2018 2234  Last data filed at 11/23/2018 1826  Gross per 24 hour   Intake 250 ml   Output --   Net 250 ml       Physical Exam   Constitutional: He is oriented to person, place, and time. No distress.   HENT:   Mouth/Throat: Oropharynx is clear and moist.   Eyes: Pupils are equal, round, and reactive to light.   Neck: Neck supple. JVD present.   Cardiovascular:   Normal VAD hum    Pulmonary/Chest: Effort normal.   Abdominal: Soft.   Tenderness to palpation of the umbilicus area, no rebound/guarding, normoactive bowel sounds   Musculoskeletal: He exhibits no edema.   Neurological: He is alert and oriented to person, place, and time. No cranial nerve deficit.   Skin: Skin is warm and dry.   Psychiatric: He has a normal mood and affect. His behavior is normal.       Significant Labs:  CBC:  Recent Labs   Lab 11/23/18  1426 11/23/18  1612   WBC 23.73* 22.36*   RBC 3.93* 3.90*   HGB 9.6* 9.6*   HCT 32.2* 31.7*    175   MCV 82 81*   MCH 24.4* 24.6*   MCHC 29.8* 30.3*     BNP:  Recent Labs   Lab 11/23/18  1426 11/23/18  1612   BNP 1,873* 1,204*     CMP:  Recent Labs   Lab 11/19/18  1139 11/23/18  1426 11/23/18  1817   GLU 84 103 151*   CALCIUM 9.2 8.7 8.2*   ALBUMIN  --  2.8* 2.6*   PROT  --  6.4 5.9*    136 133*   K 3.8 3.6 3.3*   CO2 25 21* 20*    105 104   BUN 10 23 23   CREATININE 1.2 1.6* 1.4   ALKPHOS  --  85 65   ALT  --  101* 100*   AST  --  190* 178*   BILITOT  --  2.4* 2.2*      Coagulation:   Recent Labs   Lab 11/19/18  1139 11/21/18  1007 11/23/18  1426   INR 3.7* 2.8* 2.0*     LDH:  Recent Labs   Lab 11/23/18  1426   *     Microbiology:  Microbiology Results (last 7 days)     Procedure Component Value Units  Date/Time    Blood culture x two cultures. Draw prior to antibiotics. [[id number]] Collected:  11/23/18 1613    Order Status:  Sent Specimen:  Blood from Peripheral, Hand, Left Updated:  11/23/18 1626    Blood culture x two cultures. Draw prior to antibiotics. [[id number]] Collected:  11/23/18 1613    Order Status:  Sent Specimen:  Blood from Peripheral, Antecubital, Left Updated:  11/23/18 1626          I have reviewed all pertinent labs within the past 24 hours.    Diagnostic Results:    Echo (10/25/2018)   There is a Heartmate III LVAD in place. Inflow cannula is visualized. Outflow graft is not visualized. Baseline speed is 5200 rpms. The aortic valve does not open. Septum is midline.     1 - Severely depressed left ventricular systolic function (EF 10-15%).     2 - Normal right ventricular systolic function .     3 - Heartmate III LVAD; speed 5200.     4 - Mild left atrial enlargement.     5 - Concentric remodeling.     6 - No wall motion abnormalities.     CXR:   Left-sided cardiac defibrillator noted.  LVAD noted.  There is new small left pleural effusion with left basilar atelectasis or consolidation.  There is no pneumothorax.  Cardiac silhouette is enlarged.    CT: Ct Abdomen Pelvis  Without Contrast  Result Date: 11/23/2018  1. New small volume fluid identified at the inferior aspect of the LVAD.  Finding is nonspecific however may represent an infection source given clinical concern. 2. New small volume pericardial fluid. 3. Small left pleural effusion. 4. Body wall edema.

## 2018-11-24 NOTE — CONSULTS
[facility]  Infectious Disease  Consult Note    Patient Name: Suman Hayden  MRN: [id number]  Admission Date: 11/23/2018  Hospital Length of Stay: 1 days  Attending Physician: Angie Torres MD  Primary Care Provider: Joe Ernst MD     Isolation Status: No active isolations      Inpatient consult to Infectious Diseases  Consult performed by: Ricardo Freeman PA-C  Consult ordered by: MELISSA Wilson      ID consult received. Chart being reviewed. Full consult note with recommendations to follow.      Thank you,  Ricardo Freeman PA-C  [id number]

## 2018-11-24 NOTE — H&P
[facility]  Heart Transplant  H&P    Patient Name: Suman Hayden  MRN: [id number]  Admission Date: 11/23/2018  Attending Physician: Angie Torres MD  Primary Care Provider: Joe Ernst MD  Principal Problem:Severe sepsis    Subjective:     History of Present Illness:  68 year old male with PMHx significant for HFrEF secondary to non ischemic cardiomyopathy underwent Heartmate 3 LVAD implanted as DT therapy 7/27/17, (NYHA class II) and VT on amiodarone s/p ICD (with revision in 11/2017 complicated by pocket hematoma, and prior hospitalization for ICD shocks secondary to atrial tachycardia with abberancy). Presents to ED today from clinic for worsening DLES infection (purulent discharge and blood tinged) and fever. Patient seen by VAD nurse and ID staff who feel admission to [facility] with IV antibiotics warranted. Patient also with fevers, chills, decreased appetite, scrotal boils with spontaneous purulent discharge/rupture, urinary incontinence and confusion at home for the past three days. History provided by patient and supplemented by his wife. He denies recent travel or being around sick contacts. He is complaint with taking his home medications. Of note, patient has also been hospitalized for GIB, history of ileal ulcer s/p intervention with APC using balloon enteroscopy in 2017 followed by admission again in January 2018 followed by hospitalization in March 2018 where he underwent a partial small bowel resection due to recurrent bleeding. Being followed also by EP by Dr. Winchester for possible future RFA (atrial ablation). Pt denies recent ICD shocks as well as low flow alarms from his VAD.  In the ER, he was found febrile with Tmax of 103 deg F, doppler BP 60-70's and HR 70s. He was given 2 Liters NS IVFs, cefepime/vancomycin and resumed on home meds except aldactone. Blood cultures sent as well as gram stain and culture of specimen from drive line. CXR showed left small pleural effusion  and CT abdomen performed that showed small volume fluid inferior aspect of LVAD along with small pericardial effusion. Labs notable for leukocytosis, mild INDIRA, elevated AST/ALT/T.bili, BNP and lactate 2.7 as well as CRP of 215. LDH was 294. VAD was interrogated and noted to have PI events. Pt was admitted to the ICU for further management/care.     Past Medical History:   Diagnosis Date    Arthritis     Cardiomyopathy     CHF (congestive heart failure)     Coronary artery disease     Encounter for blood transfusion     Gastric polyp     Hyperlipidemia     Hypertension     Hypotension, iatrogenic     Iron deficiency anemia due to chronic blood loss 10/15/2018    LVAD (left ventricular assist device) present     Obesity     S/P implantation of automatic cardioverter/defibrillator (AICD)     Ventricular tachycardia        Past Surgical History:   Procedure Laterality Date    ABDOMINAL SURGERY      APPENDECTOMY      CARDIAC CATHETERIZATION      CARDIAC DEFIBRILLATOR PLACEMENT      CARDIAC DEFIBRILLATOR PLACEMENT      CLOSURE-STERNAL WOUND N/A 7/28/2017    Performed by Sunny Downing MD at [facility] OR 2ND FLR    COLONOSCOPY      COLONOSCOPY N/A 3/9/2018    Procedure: COLONOSCOPY;  Surgeon: Andres Chatterjee MD;  Location: [facility] (2ND FLR);  Service: Endoscopy;  Laterality: N/A;    COLONOSCOPY N/A 8/8/2018    Procedure: COLONOSCOPY;  Surgeon: Anrdes Chatterjee MD;  Location: [facility] ENDO (2ND FLR);  Service: Endoscopy;  Laterality: N/A;    COLONOSCOPY N/A 8/8/2018    Performed by Andres Chatterjee MD at [facility] ENDO (2ND FLR)    COLONOSCOPY N/A 3/9/2018    Performed by Andres Chatterjee MD at [facility] ENDO (2ND FLR)    DEVICE ASSISTED ENTEROSCOPY-ANTEROGRADE N/A 1/25/2018    Performed by Andres Chatterjee MD at [facility] ENDO (2ND FLR)    DEVICE ASSISTED ENTEROSCOPY-ANTEROGRADE N/A 12/14/2017    Performed by Andres Chatterjee MD at [facility] ENDO (2ND FLR)    EGD (ESOPHAGOGASTRODUODENOSCOPY) N/A 10/26/2018    Performed  by Jessica Casillas MD at [facility] ENDO (2ND FLR)    EGD (ESOPHAGOGASTRODUODENOSCOPY) N/A 8/8/2018    Performed by Andres Chatterjee MD at [facility] ENDO (2ND FLR)    ESOPHAGOGASTRODUODENOSCOPY      ESOPHAGOGASTRODUODENOSCOPY N/A 8/8/2018    Procedure: EGD (ESOPHAGOGASTRODUODENOSCOPY);  Surgeon: Andres Chatterjee MD;  Location: [facility] ENDO (2ND FLR);  Service: Endoscopy;  Laterality: N/A;    ESOPHAGOGASTRODUODENOSCOPY N/A 10/26/2018    Procedure: EGD (ESOPHAGOGASTRODUODENOSCOPY);  Surgeon: Jessica Casillas MD;  Location: [facility] (2ND FLR);  Service: Endoscopy;  Laterality: N/A;  Push endoscopy    ESOPHAGOGASTRODUODENOSCOPY (EGD) N/A 3/6/2018    Performed by Andres Chatterjee MD at [facility] ENDO (2ND FLR)    ESOPHAGOGASTRODUODENOSCOPY (EGD) N/A 12/8/2017    Performed by Gagan Barger MD at [facility] ENDO (2ND FLR)    ESOPHAGOGASTRODUODENOSCOPY (EGD) N/A 8/17/2017    Performed by Kishore Mills MD at [facility] ENDO (2ND FLR)    EXPLORATORY-LAPAROTOMY with small bowel resection  N/A 3/13/2018    Performed by Kenyon Tellez MD at [facility] OR 2ND FLR    HEART CATH-RIGHT Right 7/3/2017    Performed by Angie Torres MD at [facility] CATH LAB    INSERTION-LEFT VENTRICULAR ASSIST DEVICE N/A 7/27/2017    Performed by Sunny Downing MD at [facility] OR 2ND FLR    INSERTION-LEFT VENTRICULAR ASSIST DEVICE N/A 7/25/2017    Performed by Sunny Downing MD at [facility] OR 2ND FLR    LEFT VENTRICULAR ASSIST DEVICE  07/27/2017    PLACEMENT-NEOPERICARDIUM N/A 7/28/2017    Performed by Sunny Downing MD at [facility] OR 2ND FLR    RESECTION-BOWEL  3/13/2018    Performed by Kenyon Tellez MD at [facility] OR 2ND FLR    Retrograde deivce assisted enteroscopy N/A 1/26/2018    Performed by Jesse Davis MD at [facility] ENDO (2ND FLR)    REVISION-LEAD-ICD N/A 11/20/2017    Performed by Rubén Winchester MD at [facility] CATH LAB    TONSILLECTOMY      TRANSESOPHAGEAL ECHOCARDIOGRAM (GLENN) N/A 1/9/2018    Performed by [facility] Diagnostic Provider at [facility] CATH LAB        Review of patient's allergies indicates:   Allergen Reactions    Asa [aspirin] Other (See Comments)     Bleeding ulcer       Current Facility-Administered Medications   Medication    acetaminophen tablet 650 mg    [START ON 11/24/2018] amiodarone tablet 400 mg    ceFEPIme injection 1 g    dronabinol capsule 5 mg    ferrous sulfate EC tablet 325 mg    [START ON 11/24/2018] lisinopril tablet 5 mg    magnesium oxide tablet 800 mg    pantoprazole EC tablet 40 mg    potassium chloride SA CR tablet 60 mEq    pravastatin tablet 20 mg    vancomycin in dextrose 5 % 1 gram/250 mL IVPB 1,000 mg    warfarin tablet 3 mg     Family History     Problem Relation (Age of Onset)    Heart disease Mother, Father        Tobacco Use    Smoking status: Never Smoker    Smokeless tobacco: Never Used   Substance and Sexual Activity    Alcohol use: No    Drug use: No    Sexual activity: Not Currently     Review of Systems   Constitutional: Positive for activity change, appetite change, chills and fatigue.   HENT: Negative for congestion, rhinorrhea and sinus pain.    Eyes: Negative for discharge.   Respiratory: Positive for cough. Negative for chest tightness, shortness of breath and wheezing.    Cardiovascular: Negative for chest pain, palpitations and leg swelling.   Gastrointestinal: Positive for abdominal pain. Negative for abdominal distention, constipation, diarrhea, nausea and vomiting.   Genitourinary: Positive for frequency and urgency. Negative for difficulty urinating, dysuria and flank pain.   Musculoskeletal: Positive for gait problem. Negative for back pain, joint swelling, myalgias and neck pain.   Skin: Positive for rash.   Neurological: Positive for weakness. Negative for dizziness, tremors, seizures, syncope, facial asymmetry, speech difficulty, light-headedness, numbness and headaches.   Psychiatric/Behavioral: Positive for confusion and decreased concentration. Negative for agitation and  hallucinations. The patient is not hyperactive.      Objective:     Vital Signs (Most Recent):  Temp: (!) 103.1 °F (39.5 °C) (11/23/18 2132)  Pulse: 68 (11/23/18 2127)  Resp: 15 (11/23/18 2127)  BP: (!) 65/0 (11/23/18 2100)  SpO2: 97 %(hand-held pulsr ox) (11/23/18 2127) Vital Signs (24h Range):  Temp:  [99.2 °F (37.3 °C)-103.1 °F (39.5 °C)] 103.1 °F (39.5 °C)  Pulse:  [68-78] 68  Resp:  [15-20] 15  SpO2:  [97 %-99 %] 97 %  BP: (65-77)/(0-36) 65/0     Patient Vitals for the past 72 hrs (Last 3 readings):   Weight   11/23/18 1519 78.1 kg (172 lb 3.2 oz)     Body mass index is 25.43 kg/m².      Intake/Output Summary (Last 24 hours) at 11/23/2018 2234  Last data filed at 11/23/2018 1826  Gross per 24 hour   Intake 250 ml   Output --   Net 250 ml       Physical Exam   Constitutional: He is oriented to person, place, and time. No distress.   HENT:   Mouth/Throat: Oropharynx is clear and moist.   Eyes: Pupils are equal, round, and reactive to light.   Neck: Neck supple. JVD present.   Cardiovascular:   Normal VAD hum    Pulmonary/Chest: Effort normal.   Abdominal: Soft.   Tenderness to palpation of the umbilicus area, no rebound/guarding, normoactive bowel sounds   Musculoskeletal: He exhibits no edema.   Neurological: He is alert and oriented to person, place, and time. No cranial nerve deficit.   Skin: Skin is warm and dry.   Psychiatric: He has a normal mood and affect. His behavior is normal.       Significant Labs:  CBC:  Recent Labs   Lab 11/23/18  1426 11/23/18  1612   WBC 23.73* 22.36*   RBC 3.93* 3.90*   HGB 9.6* 9.6*   HCT 32.2* 31.7*    175   MCV 82 81*   MCH 24.4* 24.6*   MCHC 29.8* 30.3*     BNP:  Recent Labs   Lab 11/23/18  1426 11/23/18  1612   BNP 1,873* 1,204*     CMP:  Recent Labs   Lab 11/19/18  1139 11/23/18  1426 11/23/18  1817   GLU 84 103 151*   CALCIUM 9.2 8.7 8.2*   ALBUMIN  --  2.8* 2.6*   PROT  --  6.4 5.9*    136 133*   K 3.8 3.6 3.3*   CO2 25 21* 20*    105 104   BUN 10 23  23   CREATININE 1.2 1.6* 1.4   ALKPHOS  --  85 65   ALT  --  101* 100*   AST  --  190* 178*   BILITOT  --  2.4* 2.2*      Coagulation:   Recent Labs   Lab 11/19/18  1139 11/21/18  1007 11/23/18  1426   INR 3.7* 2.8* 2.0*     LDH:  Recent Labs   Lab 11/23/18  1426   *     Microbiology:  Microbiology Results (last 7 days)     Procedure Component Value Units Date/Time    Blood culture x two cultures. Draw prior to antibiotics. [[id number]] Collected:  11/23/18 1613    Order Status:  Sent Specimen:  Blood from Peripheral, Hand, Left Updated:  11/23/18 1626    Blood culture x two cultures. Draw prior to antibiotics. [[id number]] Collected:  11/23/18 1613    Order Status:  Sent Specimen:  Blood from Peripheral, Antecubital, Left Updated:  11/23/18 1626          I have reviewed all pertinent labs within the past 24 hours.    Diagnostic Results:    Echo (10/25/2018)   There is a Heartmate III LVAD in place. Inflow cannula is visualized. Outflow graft is not visualized. Baseline speed is 5200 rpms. The aortic valve does not open. Septum is midline.     1 - Severely depressed left ventricular systolic function (EF 10-15%).     2 - Normal right ventricular systolic function .     3 - Heartmate III LVAD; speed 5200.     4 - Mild left atrial enlargement.     5 - Concentric remodeling.     6 - No wall motion abnormalities.     CXR:   Left-sided cardiac defibrillator noted.  LVAD noted.  There is new small left pleural effusion with left basilar atelectasis or consolidation.  There is no pneumothorax.  Cardiac silhouette is enlarged.    CT: Ct Abdomen Pelvis  Without Contrast  Result Date: 11/23/2018  1. New small volume fluid identified at the inferior aspect of the LVAD.  Finding is nonspecific however may represent an infection source given clinical concern. 2. New small volume pericardial fluid. 3. Small left pleural effusion. 4. Body wall edema.     Assessment/Plan:     * Severe sepsis    -pt presented with fever, found  to have leukocytosis, elevated lactate/CRP and mild INDIRA  -sources potentially: drive line infection (purulent discharge- sampled already and sent to lab; GS showed rare WBCs, CT abdomen showed small volume of fluid inferior aspect of VAD) vs skin infection (scrotal area with boils) vs UTI (pt with frequency, concentrated/dark urine)   -c/w broad spectrum antibiotics (Vancomycin/Cefepime)   -f/u BCx, repeat lactate level, pending pro-calc and UA with reflex to UCx   -ID consulted for rec's on antibiotic selection/management  -Derm consulted for management of scrotal skin issue  -Tylenol PRN (low dose, given elevated LFT's), Ice packs PRN      Transaminitis    -likely due to sepsis vs effects of amiodarone  -will trend LFT's   -CT abdomen: liver is normal in size and attenuation without focal hepatic lesion.  There is gallbladder sludge.  The spleen, adrenal glands, and pancreas are unremarkable.   -holding home pravastatin     Infection, skin    -worsening DLES infection with fever 102  - Send blood culutres  - ID consult, will start broad spectrum abx with vanc and cefepime (given one dose of each in ED already)  -will get imaging with CT to look for further infection, abscess      INDIRA (acute kidney injury)    -mild elevation in serum Cr (1.4, from baseline 1.2)  -holding home aldactone, s/p IVF's (2L NS)   -encourage PO fluid intake, no more IVF's   -bladder scan q6hrs, PRN ISC for PVR >300 cc     Iron deficiency anemia due to chronic blood loss    -c/w home oral iron supplementation tablets     LVAD (left ventricular assist device) present    -HM3 5200 rpm, DT therapy  -Not on home diuretics  -INR therapeutic (2), Continue coumadin for goal INR 2-3, off aspirin due to multiple GI bleeds (no heparin gtt)  -    Procedure: Device Interrogation Including analysis of device parameters  Current Settings: Ventricular Assist Device  Review of device function is stable/unstable stable    TXP LVAD INTERROGATIONS  11/23/2018 11/23/2018 11/14/2018 11/2/2018 11/2/2018 11/2/2018 11/1/2018   Type HeartMate3 HeartMate3 - HeartMate3 HeartMate3 HeartMate3 HeartMate3   Flow 4.4 4.4 - 3.4 3.8 4.1 4.2   Speed 5200 5200 - 5250 5200 5200 5200   PI 2.6 2.6 - 4.8 3.7 3.3 3.1   Power ([place]) 3.6 3.6 - 3.6 3.6 3.5 3.6   LSL - - - 4850 - - -   Pulsatility - - Pulse Pulse - - -        Chronic combined systolic and diastolic congestive heart failure    -pt presently initially dry with sepsis, s/p IVF hydration, labs notable for elevated BNP, CXR and CT show left pleural effusion, pt saturating well on 1 Liter oxygen via NC   -c/w home lisinopril, holding home aldactone b/c of elevated serum Cr     VT (ventricular tachycardia)    -pt with hx of VT, follows with EP, on Amiodarone at home (200 mg QD, which is being continued here)   -pt has future plans for possible VT ablation with EP      Essential hypertension    -c/w home lisinopril, currently holding home aldactone  -goal MAP less than 90         Stella Thornton MD  Heart Transplant  [facility]

## 2018-11-24 NOTE — ASSESSMENT & PLAN NOTE
-mild elevation in serum Cr (1.4, from baseline 1.2)  -holding home aldactone, s/p IVF's (2L NS)   -encourage PO fluid intake, no more IVF's   -bladder scan q6hrs, PRN ISC for PVR >300 cc

## 2018-11-24 NOTE — ASSESSMENT & PLAN NOTE
68 year old male with PMHx significant for HFrEF secondary to non ischemic cardiomyopathy underwent Heartmate 3 LVAD implanted as DT therapy 7/27/17 admitted from clinic for worsening DLES infection (purulent discharge and blood tinged) and fever. Patient also with fevers, chills, decreased appetite, scrotal boils with spontaneous purulent discharge/rupture, urinary incontinence and confusion at home for the past three days. Blood cultures sent as well as gram stain and culture of specimen from drive line.     CT abdomen performed that showed small volume fluid inferior aspect of LVAD CRP of 215. WBC 20.64.   LVAD DLES- showing Staph Aureus.  Blood cxs showing- gram positive coccis.  Pt and wife today reports much improvement since the start of abx.  3 boils located on scrotum w/ drainage.  Dermatology seen today report likely epidermal inclusion cysts that became secondarily infected.    -Continue pt on Vancomycin  -Discontinue pt's Cefepime  -Repeat blood cxs  - Recommend 2d echo  - Will continue to follow susceptibilities  -Pt will likely need long term IV abx  -ID will continue to follow.

## 2018-11-24 NOTE — CONSULTS
[facility]  Infectious Disease  Consult Note    Patient Name: Suman Hayden  MRN: [id number]  Admission Date: 11/23/2018  Hospital Length of Stay: 1 days  Attending Physician: Carly Porras MD  Primary Care Provider: Joe Ernst MD     Isolation Status: No active isolations    Patient information was obtained from patient, past medical records and ER records.      Consults  Assessment/Plan:     * Severe sepsis    68 year old male with PMHx significant for HFrEF secondary to non ischemic cardiomyopathy underwent Heartmate 3 LVAD implanted as DT therapy 7/27/17 admitted from clinic for worsening DLES infection (purulent discharge and blood tinged) and fever. Patient also with fevers, chills, decreased appetite, scrotal boils with spontaneous purulent discharge/rupture, urinary incontinence and confusion at home for the past three days. Blood cultures sent as well as gram stain and culture of specimen from drive line.     CT abdomen performed that showed small volume fluid inferior aspect of LVAD CRP of 215. WBC 20.64.   LVAD DLES- showing Staph Aureus.  Blood cxs showing- gram positive coccis.  Pt and wife today reports much improvement since the start of abx.  3 boils located on scrotum w/ drainage.  Dermatology seen today report likely epidermal inclusion cysts that became secondarily infected.    -Continue pt on Vancomycin  -Discontinue pt's Cefepime  -Repeat blood cxs  - Recommend 2d echo  - Will continue to follow susceptibilities  -ID will continue to follow.         Thank you for your consult. I will follow-up with patient. Please contact us if you have any additional questions.    Ricardo Freeman PA-C  Infectious Disease  [facility]    Subjective:     Principal Problem: Severe sepsis    HPI: 68 year old male with PMHx significant for HFrEF secondary to non ischemic cardiomyopathy underwent Heartmate 3 LVAD implanted as DT therapy 7/27/17, (NYHA class  II) and VT on amiodarone s/p ICD (with revision in 11/2017 complicated by pocket hematoma, and prior hospitalization for ICD shocks secondary to atrial tachycardia with abberancy). Presents to ED today from clinic for worsening DLES infection (purulent discharge and blood tinged) and fever. Patient seen by VAD nurse and ID staff who felt admission to hospitals with IV antibiotics warranted. Patient also with fevers, chills, decreased appetite, scrotal boils with spontaneous purulent discharge/rupture, urinary incontinence and confusion at home for the past three days. In the ER, he was found febrile with Tmax of 103 deg F, doppler BP 60-70's and HR 70s. He was given 2 Liters NS IVFs, cefepime/vancomycin and resumed on home meds except aldactone. Blood cultures sent as well as gram stain and culture of specimen from drive line. CXR showed left small pleural effusion and CT abdomen performed that showed small volume fluid inferior aspect of LVAD along with small pericardial effusion. Labs notable for leukocytosis, mild INDIRA, elevated AST/ALT/T.bili, BNP and lactate 2.7 as well as CRP of 215. LDH was 294.  LVAD DLES- showing Staph Aureus.  Blood cxs showing- gram positive coccis.  Pt and wife today reports much improvement since the start of abx.          Past Medical History:   Diagnosis Date    Arthritis     Cardiomyopathy     CHF (congestive heart failure)     Coronary artery disease     Encounter for blood transfusion     Gastric polyp     Hyperlipidemia     Hypertension     Hypotension, iatrogenic     Iron deficiency anemia due to chronic blood loss 10/15/2018    LVAD (left ventricular assist device) present     Obesity     S/P implantation of automatic cardioverter/defibrillator (AICD)     Ventricular tachycardia        Past Surgical History:   Procedure Laterality Date    ABDOMINAL SURGERY      APPENDECTOMY      CARDIAC CATHETERIZATION      CARDIAC DEFIBRILLATOR PLACEMENT      CARDIAC DEFIBRILLATOR  PLACEMENT      CLOSURE-STERNAL WOUND N/A 7/28/2017    Performed by Sunny Downing MD at [facility] OR 2ND FLR    COLONOSCOPY      COLONOSCOPY N/A 3/9/2018    Procedure: COLONOSCOPY;  Surgeon: Andres Chatterjee MD;  Location: [facility] (2ND FLR);  Service: Endoscopy;  Laterality: N/A;    COLONOSCOPY N/A 8/8/2018    Procedure: COLONOSCOPY;  Surgeon: Andres Chatterjee MD;  Location: [facility] (2ND FLR);  Service: Endoscopy;  Laterality: N/A;    COLONOSCOPY N/A 8/8/2018    Performed by Andres Chatterjee MD at [facility] ENDO (2ND FLR)    COLONOSCOPY N/A 3/9/2018    Performed by Andres Chatterjee MD at [facility] (2ND FLR)    DEVICE ASSISTED ENTEROSCOPY-ANTEROGRADE N/A 1/25/2018    Performed by Andres Chatterjee MD at [facility] (2ND FLR)    DEVICE ASSISTED ENTEROSCOPY-ANTEROGRADE N/A 12/14/2017    Performed by Andres Chatterjee MD at [facility] ENDO (2ND FLR)    EGD (ESOPHAGOGASTRODUODENOSCOPY) N/A 10/26/2018    Performed by Jessica Casillas MD at [facility] (2ND FLR)    EGD (ESOPHAGOGASTRODUODENOSCOPY) N/A 8/8/2018    Performed by Andres Chatterjee MD at [facility] (2ND FLR)    ESOPHAGOGASTRODUODENOSCOPY      ESOPHAGOGASTRODUODENOSCOPY N/A 8/8/2018    Procedure: EGD (ESOPHAGOGASTRODUODENOSCOPY);  Surgeon: Andres Chatterjee MD;  Location: [facility] (2ND FLR);  Service: Endoscopy;  Laterality: N/A;    ESOPHAGOGASTRODUODENOSCOPY N/A 10/26/2018    Procedure: EGD (ESOPHAGOGASTRODUODENOSCOPY);  Surgeon: Jessica Casillas MD;  Location: [facility] (2ND FLR);  Service: Endoscopy;  Laterality: N/A;  Push endoscopy    ESOPHAGOGASTRODUODENOSCOPY (EGD) N/A 3/6/2018    Performed by Andres Chatterjee MD at [facility] (2ND FLR)    ESOPHAGOGASTRODUODENOSCOPY (EGD) N/A 12/8/2017    Performed by Gagan Barger MD at [facility] ENDO (2ND FLR)    ESOPHAGOGASTRODUODENOSCOPY (EGD) N/A 8/17/2017    Performed by Kishore Mills MD at [facility] ENDO (2ND FLR)    EXPLORATORY-LAPAROTOMY with small bowel resection  N/A 3/13/2018    Performed by Kenyon Tellez MD  at [facility] OR 2ND FLR    HEART CATH-RIGHT Right 7/3/2017    Performed by Angie Torres MD at [facility] CATH LAB    INSERTION-LEFT VENTRICULAR ASSIST DEVICE N/A 7/27/2017    Performed by Sunny Downing MD at [facility] OR 2ND FLR    INSERTION-LEFT VENTRICULAR ASSIST DEVICE N/A 7/25/2017    Performed by Sunny Downing MD at [facility] OR 2ND FLR    LEFT VENTRICULAR ASSIST DEVICE  07/27/2017    PLACEMENT-NEOPERICARDIUM N/A 7/28/2017    Performed by Sunny Downing MD at [facility] OR 2ND FLR    RESECTION-BOWEL  3/13/2018    Performed by Kenyon Tellez MD at [facility] OR 2ND FLR    Retrograde deivce assisted enteroscopy N/A 1/26/2018    Performed by Jesse Davis MD at [facility] ENDO (2ND FLR)    REVISION-LEAD-ICD N/A 11/20/2017    Performed by Rubén Winchester MD at [facility] CATH LAB    TONSILLECTOMY      TRANSESOPHAGEAL ECHOCARDIOGRAM (GLENN) N/A 1/9/2018    Performed by [facility] Diagnostic Provider at [facility] CATH LAB       Review of patient's allergies indicates:   Allergen Reactions    Asa [aspirin] Other (See Comments)     Bleeding ulcer       Medications:  Medications Prior to Admission   Medication Sig    amiodarone (PACERONE) 200 MG Tab Take 2 tablets (400 mg total) by mouth once daily.    dronabinol (MARINOL) 5 MG capsule TAKE ONE CAPSULE BY MOUTH 3 TIMES DAILY BEFORE MEALS    ferrous sulfate 324 mg (65 mg iron) TbEC Take 1 tablet (325 mg total) by mouth 3 (three) times daily.    lisinopril (PRINIVIL,ZESTRIL) 5 MG tablet Take 1 tablet (5 mg total) by mouth once daily.    pantoprazole (PROTONIX) 40 MG tablet Take 1 tablet (40 mg total) by mouth 2 (two) times daily.    pravastatin (PRAVACHOL) 20 MG tablet Take 1 tablet (20 mg total) by mouth every evening.    spironolactone (ALDACTONE) 25 MG tablet Take 1 tablet (25 mg total) by mouth once daily.    warfarin (COUMADIN) 2 MG tablet Take 3mg (1 & 1/2) tabs on 11/2 only, followed by 2mg (1 tab) orally daily thereafter.    mirtazapine (REMERON) 7.5 MG Tab Take 1 tablet (7.5 mg  total) by mouth nightly.    polyethylene glycol (GLYCOLAX) 17 gram PwPk Take 17 g by mouth daily as needed (constipation).     Antibiotics (From admission, onward)    Start     Stop Route Frequency Ordered    11/24/18 1530  mupirocin 2 % ointment      -- Top Daily 11/24/18 1416    11/23/18 1700  vancomycin in dextrose 5 % 1 gram/250 mL IVPB 1,000 mg  (Vancomycin IVPB with levels panel)      -- IV Every 12 hours (non-standard times) 11/23/18 1607    11/23/18 1645  ceFEPIme injection 1 g      -- IV Every 8 hours (non-standard times) 11/23/18 1607        Antifungals (From admission, onward)    None        Antivirals (From admission, onward)    None           Immunization History   Administered Date(s) Administered    Hepatitis A, Adult 06/11/2018    Influenza - High Dose 09/26/2016, 09/27/2018    Pneumococcal Conjugate - 13 Valent 10/20/2016    Pneumococcal Polysaccharide - 23 Valent 06/11/2018    Tdap 07/19/2017       Family History     Problem Relation (Age of Onset)    Heart disease Mother, Father        Social History     Socioeconomic History    Marital status:      Spouse name: None    Number of children: None    Years of education: None    Highest education level: None   Social Needs    Financial resource strain: None    Food insecurity - worry: None    Food insecurity - inability: None    Transportation needs - medical: None    Transportation needs - non-medical: None   Occupational History    None   Tobacco Use    Smoking status: Never Smoker    Smokeless tobacco: Never Used   Substance and Sexual Activity    Alcohol use: No    Drug use: No    Sexual activity: Not Currently   Other Topics Concern    None   Social History Narrative    None     Review of Systems   Constitutional: Positive for fever. Negative for activity change, appetite change, chills and diaphoresis.   HENT: Negative for congestion.    Respiratory: Negative for cough, chest tightness and shortness of breath.     Cardiovascular: Negative for chest pain.   Gastrointestinal: Negative for abdominal distention, abdominal pain, diarrhea, nausea and vomiting.   Genitourinary: Positive for genital sores. Negative for difficulty urinating.   Musculoskeletal: Negative for arthralgias.   Neurological: Negative for tremors, syncope and speech difficulty.   Psychiatric/Behavioral: Negative for agitation.     Objective:     Vital Signs (Most Recent):  Temp: (!) 102.1 °F (38.9 °C) (11/24/18 1131)  Pulse: 79 (11/24/18 1300)  Resp: 18 (11/24/18 1300)  BP: (!) 58/0 (11/24/18 1200)  SpO2: 98 % (11/24/18 1300) Vital Signs (24h Range):  Temp:  [98.4 °F (36.9 °C)-103.1 °F (39.5 °C)] 102.1 °F (38.9 °C)  Pulse:  [] 79  Resp:  [7-33] 18  SpO2:  [92 %-100 %] 98 %  BP: (58-76)/(0-36) 58/0     Weight: 78.5 kg (173 lb 1 oz)  Body mass index is 25.56 kg/m².    Estimated Creatinine Clearance: 50.5 mL/min (based on SCr of 1.4 mg/dL).    Physical Exam   Constitutional: He is oriented to person, place, and time. No distress.   HENT:   Mouth/Throat: Oropharynx is clear and moist.   Eyes: Pupils are equal, round, and reactive to light.   Neck: Neck supple. JVD present.   Cardiovascular:   Normal VAD hum    Pulmonary/Chest: Effort normal.   Abdominal: Soft.   Tenderness to palpation of the umbilicus area, no rebound/guarding, normoactive bowel sounds   Genitourinary:   Genitourinary Comments: Three boils present on testicles.  2 of which were openly draining   Musculoskeletal: He exhibits no edema.   Neurological: He is alert and oriented to person, place, and time. No cranial nerve deficit.   Skin: Skin is warm and dry.   Psychiatric: He has a normal mood and affect. His behavior is normal.       Significant Labs:   Blood Culture:   Recent Labs   Lab 10/13/18  2007 11/23/18  1613   LABBLOO No growth after 5 days. Gram stain clement bottle: Gram positive cocci in clusters resembling Staph  Results called to and read back by: Sharlene Landon RN 11/24/2018   09:35  Gram stain aer bottle: Gram positive cocci in clusters resembling Staph  Gram stain clement bottle: Gram positive cocci in clusters resembling Staph  Results called to and read back by: Sharlene Landon RN 11/24/2018  09:36     BMP:   Recent Labs   Lab 11/24/18  0346   GLU 81   *   K 4.9      CO2 14*   BUN 27*   CREATININE 1.4   CALCIUM 8.0*   MG 2.1     CBC:   Recent Labs   Lab 11/23/18  1426 11/23/18  1612 11/24/18  0346   WBC 23.73* 22.36* 20.64*   HGB 9.6* 9.6* 8.4*   HCT 32.2* 31.7* 29.0*    175 143*     CMP:   Recent Labs   Lab 11/23/18  1426 11/23/18  1817 11/23/18  2309 11/24/18  0346    133*  --  129*   K 3.6 3.3*  --  4.9    104  --  105   CO2 21* 20*  --  14*    151*  --  81   BUN 23 23  --  27*   CREATININE 1.6* 1.4  --  1.4   CALCIUM 8.7 8.2*  --  8.0*   PROT 6.4 5.9* 5.1*  --    ALBUMIN 2.8* 2.6* 2.3*  --    BILITOT 2.4* 2.2* 1.8*  --    ALKPHOS 85 65 61  --    * 178* 151*  --    * 100* 91*  --    ANIONGAP 10 9  --  10   EGFRNONAA 43.6* 51.3*  --  51.3*     Microbiology Results (last 7 days)     Procedure Component Value Units Date/Time    Blood culture x two cultures. Draw prior to antibiotics. [[id number]] Collected:  11/23/18 1613    Order Status:  Completed Specimen:  Blood from Peripheral, Hand, Left Updated:  11/24/18 1422     Blood Culture, Routine Gram stain clement bottle: Gram positive cocci in clusters resembling Staph     Blood Culture, Routine Results called to and read back by: Sharlene Landon RN 11/24/2018  09:35     Blood Culture, Routine Gram stain aer bottle: Gram positive cocci in clusters resembling Staph    Narrative:       Aerobic and anaerobic    Blood culture [[id number]] Collected:  11/24/18 1215    Order Status:  Sent Specimen:  Blood from Peripheral, Forearm, Right Updated:  11/24/18 1317    Blood culture [[id number]] Collected:  11/24/18 1220    Order Status:  Sent Specimen:  Blood from Peripheral, Wrist, Right Updated:  11/24/18  1315    Blood culture x two cultures. Draw prior to antibiotics. [[id number]] Collected:  11/23/18 1613    Order Status:  Completed Specimen:  Blood from Peripheral, Antecubital, Left Updated:  11/24/18 0936     Blood Culture, Routine Gram stain clement bottle: Gram positive cocci in clusters resembling Staph     Blood Culture, Routine Results called to and read back by: Sharleen Landon RN 11/24/2018  09:36    Narrative:       Aerobic and anaerobic        Wound Culture:   Recent Labs   Lab 11/23/18  1448   LABAERO STAPHYLOCOCCUS AUREUS  Moderate  Susceptibility pending       All pertinent labs within the past 24 hours have been reviewed.  Recent Lab Results       11/24/18  0346   11/23/18  2309   11/23/18  1817   11/23/18  1613   11/23/18  1612        Immature Granulocytes CANCELED  Comment:  Result canceled by the ancillary.       1.0     Immature Grans (Abs) CANCELED  Comment:  Mild elevation in immature granulocytes is non specific and   can be seen in a variety of conditions including stress response,   acute inflammation, trauma and pregnancy. Correlation with other   laboratory and clinical findings is essential.    Result canceled by the ancillary.         0.23  Comment:  Mild elevation in immature granulocytes is non specific and   can be seen in a variety of conditions including stress response,   acute inflammation, trauma and pregnancy. Correlation with other   laboratory and clinical findings is essential.       Procalcitonin   2.51  Comment:  A concentration < 0.25 ng/mL represents a low risk bacterial   infection.  Procalcitonin may not be accurate among patients with localized   infection, recent trauma or major surgery, immunosuppressed state,   invasive fungal infection, renal dysfunction. Decisions regarding   initiation or continuation of antibiotic therapy should not be based   solely on procalcitonin levels.             Albumin   2.3 2.6         Alkaline Phosphatase   61 65         ALT   91 100          Anion Gap 10   9         Aniso Slight             aPTT 32.5  Comment:  aPTT therapeutic range = 39-69 seconds             AST   151 178         BANDS 10.0             Baso # CANCELED  Comment:  Result canceled by the ancillary.       0.04     Basophil% 1.0       0.2     Bilirubin, Direct   1.2           Total Bilirubin   1.8  Comment:  For infants and newborns, interpretation of results should be based  on gestational age, weight and in agreement with clinical  observations.  Premature Infant recommended reference ranges:  Up to 24 hours.............<8.0 mg/dL  Up to 48 hours............<12.0 mg/dL  3-5 days..................<15.0 mg/dL  6-29 days.................<15.0 mg/dL   2.2  Comment:  For infants and newborns, interpretation of results should be based  on gestational age, weight and in agreement with clinical  observations.  Premature Infant recommended reference ranges:  Up to 24 hours.............<8.0 mg/dL  Up to 48 hours............<12.0 mg/dL  3-5 days..................<15.0 mg/dL  6-29 days.................<15.0 mg/dL           Blood Culture, Routine       Gram stain clement bottle: Gram positive cocci in clusters resembling Staph[P]              Results called to and read back by: Sharlene Ladnon RN 11/24/2018  09:35[P]              Gram stain aer bottle: Gram positive cocci in clusters resembling Staph[P]              Gram stain clement bottle: Gram positive cocci in clusters resembling Staph[P]              Results called to and read back by: Sharlene Landon RN 11/24/2018  09:36[P]       BNP         1,204  Comment:  Values of less than 100 pg/ml are consistent with non-CHF populations.     BUN, Bld 27   23         [place] Cells Moderate             Calcium 8.0   8.2         Chloride 105   104         CO2 14   20         Creatinine 1.4   1.4         Differential Method Manual  Comment:  Corrected result; previously reported as Automated on 11/24/2018 at   07:21.  [C]       Automated     eGFR if  59.3    59.3         eGFR if non  51.3  Comment:  Calculation used to obtain the estimated glomerular filtration  rate (eGFR) is the CKD-EPI equation.      51.3  Comment:  Calculation used to obtain the estimated glomerular filtration  rate (eGFR) is the CKD-EPI equation.            Eos # CANCELED  Comment:  Result canceled by the ancillary.       0.0     Eosinophil% 0.0       0.0     Fragmented Cells Occasional             Large/Giant Platelets Present             Glucose 81   151         Gran # (ANC)         19.6     Gran% 79.0       87.8     Hematocrit 29.0       31.7     Hemoglobin 8.4       9.6     Hypo Occasional             Coumadin Monitoring INR 2.4  Comment:  Coumadin Therapy:  2.0 - 3.0 for INR for all indicators except mechanical heart valves  and antiphospholipid syndromes which should use 2.5 - 3.5.               Lactate, Ramses   1.3  Comment:  Falsely low lactic acid results can be found in samples   containing >=13.0 mg/dL total bilirubin and/or >=3.5 mg/dL   direct bilirubin.       2.7  Comment:  Falsely low lactic acid results can be found in samples   containing >=13.0 mg/dL total bilirubin and/or >=3.5 mg/dL   direct bilirubin.         Comment:  Results are increased in hemolyzed samples.  *Result may be interfered by visible hemolysis               Lymph # CANCELED  Comment:  Result canceled by the ancillary.       0.8     Lymph% 3.0       3.4     Magnesium 2.1   1.6         MCH 23.8       24.6     MCHC 29.0       30.3     MCV 82       81     Metamyelocytes 1.0             Mono # CANCELED  Comment:  Result canceled by the ancillary.       1.7     Mono% 6.0       7.6     MPV 11.5       11.4     nRBC 0       0     Ovalocytes Occasional             Phosphorus 2.5             Platelet Estimate Decreased             Platelets 143       175     Poik Moderate             Poly Occasional             Potassium 4.9   3.3         Total Protein   5.1 5.9         Protime 23.4             RBC  3.53       3.90     RDW 18.6       18.6     Schistocytes Present             Sodium 129   133         Vacuolated Granulocytes Present             WBC 20.64       22.36                          Significant Imaging: I have reviewed all pertinent imaging results/findings within the past 24 hours.

## 2018-11-24 NOTE — PLAN OF CARE
Problem: Pressure Ulcer Risk (Martínez Scale) (Adult,Obstetrics,Pediatric)  Goal: Identify Related Risk Factors and Signs and Symptoms  Related risk factors and signs and symptoms are identified upon initiation of Human Response Clinical Practice Guideline (CPG)  Outcome: Ongoing (interventions implemented as appropriate)  No new skin break down noted. Turned q 2 hrs.

## 2018-11-24 NOTE — ASSESSMENT & PLAN NOTE
-HM3 5200 rpm, DT therapy  -Not on home diuretics  -INR therapeutic (2), Continue coumadin for goal INR 2-3, off aspirin due to multiple GI bleeds (no heparin gtt) No bridge  -    Procedure: Device Interrogation Including analysis of device parameters  Current Settings: Ventricular Assist Device  Review of device function is stable/unstable stable    TXP LVAD INTERROGATIONS 11/24/2018 11/24/2018 11/24/2018 11/24/2018 11/24/2018 11/24/2018 11/24/2018   Type HeartMate3 HeartMate3 HeartMate3 HeartMate3 HeartMate3 HeartMate II HeartMate3   Flow 4.5 4.4 4.6 4.2 4.2  4.1  4.2    Speed 5200 5200 5200 5100 5200 4900 5200   PI 2.6 2.7 1.8 2.8 1.9 2.8 1.9   Power ([place]) 3.5 3.7 3.5 3.4 3.5 3.2 3.5   LSL - - - - - - -   Pulsatility Pulse Pulse Pulse Intermittent pulse Intermittent pulse Intermittent pulse Intermittent pulse

## 2018-11-24 NOTE — ASSESSMENT & PLAN NOTE
-pt presented with fever, found to have leukocytosis, elevated lactate/CRP and mild INDIRA  -sources potentially: drive line infection (purulent discharge- sampled already and sent to lab; GS showed rare WBCs, CT abdomen showed small volume of fluid inferior aspect of VAD) vs skin infection (scrotal area with boils) vs UTI (pt with frequency, concentrated/dark urine)   -c/w broad spectrum antibiotics (Vancomycin/Cefepime)   -f/u BCx, lactate is wnl, procalc is elevated.  -ID consulted for rec's on antibiotic selection/management  -Derm consulted for management of scrotal skin issue  -Tylenol PRN (low dose, given elevated LFT's), Ice packs PRN

## 2018-11-24 NOTE — PLAN OF CARE
Problem: Infection, Risk/Actual (Adult)  Goal: Identify Related Risk Factors and Signs and Symptoms  Related risk factors and signs and symptoms are identified upon initiation of Human Response Clinical Practice Guideline (CPG)  Outcome: Ongoing (interventions implemented as appropriate)  Tmax 102.1, prn tylenol give, repeat blood cultures obtained. Positive blood cultures resulted.  VS stable.

## 2018-11-24 NOTE — HPI
68 year old male with PMHx significant for HFrEF secondary to non ischemic cardiomyopathy underwent Heartmate 3 LVAD implanted as DT therapy 7/27/17, (NYHA class II) and VT on amiodarone s/p ICD (with revision in 11/2017 complicated by pocket hematoma, and prior hospitalization for ICD shocks secondary to atrial tachycardia with abberancy). Presents to ED today from clinic for worsening DLES infection (purulent discharge and blood tinged) and fever. Patient seen by VAD nurse and ID staff who felt admission to [facility] with IV antibiotics warranted. Patient also with fevers, chills, decreased appetite, scrotal boils with spontaneous purulent discharge/rupture, urinary incontinence and confusion at home for the past three days. In the ER, he was found febrile with Tmax of 103 deg F, doppler BP 60-70's and HR 70s. He was given 2 Liters NS IVFs, cefepime/vancomycin and resumed on home meds except aldactone. Blood cultures sent as well as gram stain and culture of specimen from drive line. CXR showed left small pleural effusion and CT abdomen performed that showed small volume fluid inferior aspect of LVAD along with small pericardial effusion. Labs notable for leukocytosis, mild INDIRA, elevated AST/ALT/T.bili, BNP and lactate 2.7 as well as CRP of 215. LDH was 294.  LVAD DLES- showing Staph Aureus.  Blood cxs showing- gram positive coccis.  Pt and wife today reports much improvement since the start of abx.

## 2018-11-24 NOTE — SUBJECTIVE & OBJECTIVE
Past Medical History:   Diagnosis Date    Arthritis     Cardiomyopathy     CHF (congestive heart failure)     Coronary artery disease     Encounter for blood transfusion     Gastric polyp     Hyperlipidemia     Hypertension     Hypotension, iatrogenic     Iron deficiency anemia due to chronic blood loss 10/15/2018    LVAD (left ventricular assist device) present     Obesity     S/P implantation of automatic cardioverter/defibrillator (AICD)     Ventricular tachycardia        Past Surgical History:   Procedure Laterality Date    ABDOMINAL SURGERY      APPENDECTOMY      CARDIAC CATHETERIZATION      CARDIAC DEFIBRILLATOR PLACEMENT      CARDIAC DEFIBRILLATOR PLACEMENT      CLOSURE-STERNAL WOUND N/A 7/28/2017    Performed by Sunny Downing MD at [facility] OR 2ND FLR    COLONOSCOPY      COLONOSCOPY N/A 3/9/2018    Procedure: COLONOSCOPY;  Surgeon: Andres Chatterjee MD;  Location: [facility] (2ND FLR);  Service: Endoscopy;  Laterality: N/A;    COLONOSCOPY N/A 8/8/2018    Procedure: COLONOSCOPY;  Surgeon: Andres Chatterjee MD;  Location: [facility] (2ND FLR);  Service: Endoscopy;  Laterality: N/A;    COLONOSCOPY N/A 8/8/2018    Performed by Andres Chatterjee MD at [facility] ENDO (2ND FLR)    COLONOSCOPY N/A 3/9/2018    Performed by Andres Chatterjee MD at [facility] (2ND FLR)    DEVICE ASSISTED ENTEROSCOPY-ANTEROGRADE N/A 1/25/2018    Performed by Andres Chatterjee MD at [facility] ENDO (2ND FLR)    DEVICE ASSISTED ENTEROSCOPY-ANTEROGRADE N/A 12/14/2017    Performed by Andres Chatterjee MD at [facility] (2ND FLR)    EGD (ESOPHAGOGASTRODUODENOSCOPY) N/A 10/26/2018    Performed by Jessica Casillas MD at [facility] ENDO (2ND FLR)    EGD (ESOPHAGOGASTRODUODENOSCOPY) N/A 8/8/2018    Performed by Andres Chatterjee MD at [facility] (2ND FLR)    ESOPHAGOGASTRODUODENOSCOPY      ESOPHAGOGASTRODUODENOSCOPY N/A 8/8/2018    Procedure: EGD (ESOPHAGOGASTRODUODENOSCOPY);  Surgeon: Andres Chatterjee MD;  Location: [facility] (2ND FLR);  Service:  Endoscopy;  Laterality: N/A;    ESOPHAGOGASTRODUODENOSCOPY N/A 10/26/2018    Procedure: EGD (ESOPHAGOGASTRODUODENOSCOPY);  Surgeon: Jessica Casillas MD;  Location: [facility] (2ND FLR);  Service: Endoscopy;  Laterality: N/A;  Push endoscopy    ESOPHAGOGASTRODUODENOSCOPY (EGD) N/A 3/6/2018    Performed by Andres Chatterjee MD at [facility] ENDO (2ND FLR)    ESOPHAGOGASTRODUODENOSCOPY (EGD) N/A 12/8/2017    Performed by Gagan Barger MD at [facility] ENDO (2ND FLR)    ESOPHAGOGASTRODUODENOSCOPY (EGD) N/A 8/17/2017    Performed by Kishore Mills MD at [facility] (2ND FLR)    EXPLORATORY-LAPAROTOMY with small bowel resection  N/A 3/13/2018    Performed by Kenyon Tellez MD at [facility] OR 2ND FLR    HEART CATH-RIGHT Right 7/3/2017    Performed by Angie Torres MD at [facility] CATH LAB    INSERTION-LEFT VENTRICULAR ASSIST DEVICE N/A 7/27/2017    Performed by Sunny Downing MD at [facility] OR 2ND FLR    INSERTION-LEFT VENTRICULAR ASSIST DEVICE N/A 7/25/2017    Performed by Sunny Downing MD at [facility] OR 2ND FLR    LEFT VENTRICULAR ASSIST DEVICE  07/27/2017    PLACEMENT-NEOPERICARDIUM N/A 7/28/2017    Performed by Sunny Downing MD at [facility] OR 2ND FLR    RESECTION-BOWEL  3/13/2018    [facility]    Retrograde deivce assisted enteroscopy N/A 1/26/2018    Performed by Jesse Davis MD at [facility] (2ND FLR)    REVISION-LEAD-ICD N/A 11/20/2017    Performed by Rubén Winchester MD at [facility] CATH LAB    TONSILLECTOMY      TRANSESOPHAGEAL ECHOCARDIOGRAM (GLENN) N/A 1/9/2018    Performed by [facility] Diagnostic Provider at [facility] CATH LAB       Review of patient's allergies indicates:   Allergen Reactions    Asa [aspirin] Other (See Comments)     Bleeding ulcer       Medications:  Medications Prior to Admission   Medication Sig    amiodarone (PACERONE) 200 MG Tab Take 2 tablets (400 mg total) by mouth once daily.    dronabinol (MARINOL) 5 MG capsule TAKE ONE CAPSULE BY MOUTH 3 TIMES DAILY  BEFORE MEALS    ferrous sulfate 324 mg (65 mg iron) TbEC Take 1 tablet (325 mg total) by mouth 3 (three) times daily.    lisinopril (PRINIVIL,ZESTRIL) 5 MG tablet Take 1 tablet (5 mg total) by mouth once daily.    pantoprazole (PROTONIX) 40 MG tablet Take 1 tablet (40 mg total) by mouth 2 (two) times daily.    pravastatin (PRAVACHOL) 20 MG tablet Take 1 tablet (20 mg total) by mouth every evening.    spironolactone (ALDACTONE) 25 MG tablet Take 1 tablet (25 mg total) by mouth once daily.    warfarin (COUMADIN) 2 MG tablet Take 3mg (1 & 1/2) tabs on 11/2 only, followed by 2mg (1 tab) orally daily thereafter.    mirtazapine (REMERON) 7.5 MG Tab Take 1 tablet (7.5 mg total) by mouth nightly.    polyethylene glycol (GLYCOLAX) 17 gram PwPk Take 17 g by mouth daily as needed (constipation).     Antibiotics (From admission, onward)    Start     Stop Route Frequency Ordered    11/24/18 1530  mupirocin 2 % ointment      -- Top Daily 11/24/18 1416    11/23/18 1700  vancomycin in dextrose 5 % 1 gram/250 mL IVPB 1,000 mg  (Vancomycin IVPB with levels panel)      -- IV Every 12 hours (non-standard times) 11/23/18 1607    11/23/18 1645  ceFEPIme injection 1 g      -- IV Every 8 hours (non-standard times) 11/23/18 1607        Antifungals (From admission, onward)    None        Antivirals (From admission, onward)    None           Immunization History   Administered Date(s) Administered    Hepatitis A, Adult 06/11/2018    Influenza - High Dose 09/26/2016, 09/27/2018    Pneumococcal Conjugate - 13 Valent 10/20/2016    Pneumococcal Polysaccharide - 23 Valent 06/11/2018    Tdap 07/19/2017       Family History     Problem Relation (Age of Onset)    Heart disease Mother, Father        Social History     Socioeconomic History    Marital status:      Spouse name: None    Number of children: None    Years of education: None    Highest education level: None   Social Needs    Financial resource strain: None    Food  insecurity - worry: None    Food insecurity - inability: None    Transportation needs - medical: None    Transportation needs - non-medical: None   Occupational History    None   Tobacco Use    Smoking status: Never Smoker    Smokeless tobacco: Never Used   Substance and Sexual Activity    Alcohol use: No    Drug use: No    Sexual activity: Not Currently   Other Topics Concern    None   Social History Narrative    None     Review of Systems   Constitutional: Positive for fever. Negative for activity change, appetite change, chills and diaphoresis.   HENT: Negative for congestion.    Respiratory: Negative for cough, chest tightness and shortness of breath.    Cardiovascular: Negative for chest pain.   Gastrointestinal: Negative for abdominal distention, abdominal pain, diarrhea, nausea and vomiting.   Genitourinary: Positive for genital sores. Negative for difficulty urinating.   Musculoskeletal: Negative for arthralgias.   Neurological: Negative for tremors, syncope and speech difficulty.   Psychiatric/Behavioral: Negative for agitation.     Objective:     Vital Signs (Most Recent):  Temp: (!) 102.1 °F (38.9 °C) (11/24/18 1131)  Pulse: 79 (11/24/18 1300)  Resp: 18 (11/24/18 1300)  BP: (!) 58/0 (11/24/18 1200)  SpO2: 98 % (11/24/18 1300) Vital Signs (24h Range):  Temp:  [98.4 °F (36.9 °C)-103.1 °F (39.5 °C)] 102.1 °F (38.9 °C)  Pulse:  [] 79  Resp:  [7-33] 18  SpO2:  [92 %-100 %] 98 %  BP: (58-76)/(0-36) 58/0     Weight: 78.5 kg (173 lb 1 oz)  Body mass index is 25.56 kg/m².    Estimated Creatinine Clearance: 50.5 mL/min (based on SCr of 1.4 mg/dL).    Physical Exam   Constitutional: He is oriented to person, place, and time. No distress.   HENT:   Mouth/Throat: Oropharynx is clear and moist.   Eyes: Pupils are equal, round, and reactive to light.   Neck: Neck supple. JVD present.   Cardiovascular:   Normal VAD hum    Pulmonary/Chest: Effort normal.   Abdominal: Soft.   Tenderness to palpation of the  umbilicus area, no rebound/guarding, normoactive bowel sounds   Genitourinary:   Genitourinary Comments: Three boils present on testicles.  2 of which were openly draining   Musculoskeletal: He exhibits no edema.   Neurological: He is alert and oriented to person, place, and time. No cranial nerve deficit.   Skin: Skin is warm and dry.   Psychiatric: He has a normal mood and affect. His behavior is normal.       Significant Labs:   Blood Culture:   Recent Labs   Lab 10/13/18  2007 11/23/18  1613   LABBLOO No growth after 5 days. Gram stain clement bottle: Gram positive cocci in clusters resembling Staph  Results called to and read back by: Sharlene Landon RN 11/24/2018  09:35  Gram stain aer bottle: Gram positive cocci in clusters resembling Staph  Gram stain clement bottle: Gram positive cocci in clusters resembling Staph  Results called to and read back by: Sharlene Landon RN 11/24/2018  09:36     BMP:   Recent Labs   Lab 11/24/18  0346   GLU 81   *   K 4.9      CO2 14*   BUN 27*   CREATININE 1.4   CALCIUM 8.0*   MG 2.1     CBC:   Recent Labs   Lab 11/23/18  1426 11/23/18  1612 11/24/18  0346   WBC 23.73* 22.36* 20.64*   HGB 9.6* 9.6* 8.4*   HCT 32.2* 31.7* 29.0*    175 143*     CMP:   Recent Labs   Lab 11/23/18  1426 11/23/18  1817 11/23/18  2309 11/24/18  0346    133*  --  129*   K 3.6 3.3*  --  4.9    104  --  105   CO2 21* 20*  --  14*    151*  --  81   BUN 23 23  --  27*   CREATININE 1.6* 1.4  --  1.4   CALCIUM 8.7 8.2*  --  8.0*   PROT 6.4 5.9* 5.1*  --    ALBUMIN 2.8* 2.6* 2.3*  --    BILITOT 2.4* 2.2* 1.8*  --    ALKPHOS 85 65 61  --    * 178* 151*  --    * 100* 91*  --    ANIONGAP 10 9  --  10   EGFRNONAA 43.6* 51.3*  --  51.3*     Microbiology Results (last 7 days)     Procedure Component Value Units Date/Time    Blood culture x two cultures. Draw prior to antibiotics. [[id number]] Collected:  11/23/18 1613    Order Status:  Completed Specimen:  Blood from  Peripheral, Hand, Left Updated:  11/24/18 1422     Blood Culture, Routine Gram stain clement bottle: Gram positive cocci in clusters resembling Staph     Blood Culture, Routine Results called to and read back by: Sharlene Landon RN 11/24/2018  09:35     Blood Culture, Routine Gram stain aer bottle: Gram positive cocci in clusters resembling Staph    Narrative:       Aerobic and anaerobic    Blood culture [[id number]] Collected:  11/24/18 1215    Order Status:  Sent Specimen:  Blood from Peripheral, Forearm, Right Updated:  11/24/18 1317    Blood culture [[id number]] Collected:  11/24/18 1220    Order Status:  Sent Specimen:  Blood from Peripheral, Wrist, Right Updated:  11/24/18 1315    Blood culture x two cultures. Draw prior to antibiotics. [[id number]] Collected:  11/23/18 1613    Order Status:  Completed Specimen:  Blood from Peripheral, Antecubital, Left Updated:  11/24/18 0936     Blood Culture, Routine Gram stain clement bottle: Gram positive cocci in clusters resembling Staph     Blood Culture, Routine Results called to and read back by: Sharlene Landon RN 11/24/2018  09:36    Narrative:       Aerobic and anaerobic        Wound Culture:   Recent Labs   Lab 11/23/18  1448   LABAERO STAPHYLOCOCCUS AUREUS  Moderate  Susceptibility pending       All pertinent labs within the past 24 hours have been reviewed.  Recent Lab Results       11/24/18  0346   11/23/18  2309   11/23/18  1817   11/23/18  1613   11/23/18  1612        Immature Granulocytes CANCELED  Comment:  Result canceled by the ancillary.       1.0     Immature Grans (Abs) CANCELED  Comment:  Mild elevation in immature granulocytes is non specific and   can be seen in a variety of conditions including stress response,   acute inflammation, trauma and pregnancy. Correlation with other   laboratory and clinical findings is essential.    Result canceled by the ancillary.         0.23  Comment:  Mild elevation in immature granulocytes is non specific and   can be seen in a  variety of conditions including stress response,   acute inflammation, trauma and pregnancy. Correlation with other   laboratory and clinical findings is essential.       Procalcitonin   2.51  Comment:  A concentration < 0.25 ng/mL represents a low risk bacterial   infection.  Procalcitonin may not be accurate among patients with localized   infection, recent trauma or major surgery, immunosuppressed state,   invasive fungal infection, renal dysfunction. Decisions regarding   initiation or continuation of antibiotic therapy should not be based   solely on procalcitonin levels.             Albumin   2.3 2.6         Alkaline Phosphatase   61 65         ALT   91 100         Anion Gap 10   9         Aniso Slight             aPTT 32.5  Comment:  aPTT therapeutic range = 39-69 seconds             AST   151 178         BANDS 10.0             Baso # CANCELED  Comment:  Result canceled by the ancillary.       0.04     Basophil% 1.0       0.2     Bilirubin, Direct   1.2           Total Bilirubin   1.8  Comment:  For infants and newborns, interpretation of results should be based  on gestational age, weight and in agreement with clinical  observations.  Premature Infant recommended reference ranges:  Up to 24 hours.............<8.0 mg/dL  Up to 48 hours............<12.0 mg/dL  3-5 days..................<15.0 mg/dL  6-29 days.................<15.0 mg/dL   2.2  Comment:  For infants and newborns, interpretation of results should be based  on gestational age, weight and in agreement with clinical  observations.  Premature Infant recommended reference ranges:  Up to 24 hours.............<8.0 mg/dL  Up to 48 hours............<12.0 mg/dL  3-5 days..................<15.0 mg/dL  6-29 days.................<15.0 mg/dL           Blood Culture, Routine       Gram stain clement bottle: Gram positive cocci in clusters resembling Staph[P]              Results called to and read back by: Sharlene Landon RN 11/24/2018  09:35[P]              Gram  stain aer bottle: Gram positive cocci in clusters resembling Staph[P]              Gram stain clement bottle: Gram positive cocci in clusters resembling Staph[P]              Results called to and read back by: Sharlene Landon RN 11/24/2018  09:36[P]       BNP         1,204  Comment:  Values of less than 100 pg/ml are consistent with non-CHF populations.     BUN, Bld 27   23         [place] Cells Moderate             Calcium 8.0   8.2         Chloride 105   104         CO2 14   20         Creatinine 1.4   1.4         Differential Method Manual  Comment:  Corrected result; previously reported as Automated on 11/24/2018 at   07:21.  [C]       Automated     eGFR if  59.3   59.3         eGFR if non  51.3  Comment:  Calculation used to obtain the estimated glomerular filtration  rate (eGFR) is the CKD-EPI equation.      51.3  Comment:  Calculation used to obtain the estimated glomerular filtration  rate (eGFR) is the CKD-EPI equation.            Eos # CANCELED  Comment:  Result canceled by the ancillary.       0.0     Eosinophil% 0.0       0.0     Fragmented Cells Occasional             Large/Giant Platelets Present             Glucose 81   151         Gran # (ANC)         19.6     Gran% 79.0       87.8     Hematocrit 29.0       31.7     Hemoglobin 8.4       9.6     Hypo Occasional             Coumadin Monitoring INR 2.4  Comment:  Coumadin Therapy:  2.0 - 3.0 for INR for all indicators except mechanical heart valves  and antiphospholipid syndromes which should use 2.5 - 3.5.               Lactate, Ramses   1.3  Comment:  Falsely low lactic acid results can be found in samples   containing >=13.0 mg/dL total bilirubin and/or >=3.5 mg/dL   direct bilirubin.       2.7  Comment:  Falsely low lactic acid results can be found in samples   containing >=13.0 mg/dL total bilirubin and/or >=3.5 mg/dL   direct bilirubin.         Comment:  Results are increased in hemolyzed samples.  *Result may be  interfered by visible hemolysis               Lymph # CANCELED  Comment:  Result canceled by the ancillary.       0.8     Lymph% 3.0       3.4     Magnesium 2.1   1.6         MCH 23.8       24.6     MCHC 29.0       30.3     MCV 82       81     Metamyelocytes 1.0             Mono # CANCELED  Comment:  Result canceled by the ancillary.       1.7     Mono% 6.0       7.6     MPV 11.5       11.4     nRBC 0       0     Ovalocytes Occasional             Phosphorus 2.5             Platelet Estimate Decreased             Platelets 143       175     Poik Moderate             Poly Occasional             Potassium 4.9   3.3         Total Protein   5.1 5.9         Protime 23.4             RBC 3.53       3.90     RDW 18.6       18.6     Schistocytes Present             Sodium 129   133         Vacuolated Granulocytes Present             WBC 20.64       22.36                          Significant Imaging: I have reviewed all pertinent imaging results/findings within the past 24 hours.

## 2018-11-24 NOTE — ASSESSMENT & PLAN NOTE
-HM3 5200 rpm, DT therapy  -Not on home diuretics  -INR therapeutic (2), Continue coumadin for goal INR 2-3, off aspirin due to multiple GI bleeds (no heparin gtt)  -    Procedure: Device Interrogation Including analysis of device parameters  Current Settings: Ventricular Assist Device  Review of device function is stable/unstable stable    TXP LVAD INTERROGATIONS 11/23/2018 11/23/2018 11/14/2018 11/2/2018 11/2/2018 11/2/2018 11/1/2018   Type HeartMate3 HeartMate3 - HeartMate3 HeartMate3 HeartMate3 HeartMate3   Flow 4.4 4.4 - 3.4 3.8 4.1 4.2   Speed 5200 5200 - 5250 5200 5200 5200   PI 2.6 2.6 - 4.8 3.7 3.3 3.1   Power ([place]) 3.6 3.6 - 3.6 3.6 3.5 3.6   LSL - - - 4850 - - -   Pulsatility - - Pulse Pulse - - -

## 2018-11-24 NOTE — NURSING
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 Pt GCS 15.Hooked to monitor, kept comfortable in bed.  VAD coordinator  And team made aware of admission.   Labs sent. WCTM

## 2018-11-24 NOTE — NURSING
Notified Dr. Monte of pt elevated temp of 102.1, Doppler pressure of 58-62 with multiple assessments. Asymptomatic at this time. Repeat cultures ordered.  No additional orders received.  Will continue to monitor.

## 2018-11-24 NOTE — ASSESSMENT & PLAN NOTE
-pt with hx of VT, follows with EP, on Amiodarone at home (200 mg QD, which is being continued here)   -pt has future plans for possible VT ablation with EP  - Not in VT right now after ICD was interrogated. Looks like slow AFL at rates of 78.

## 2018-11-24 NOTE — ASSESSMENT & PLAN NOTE
Patient with 3 draining scrotal abscesses. Likely started as epidermal inclusion cysts (which are common on the scrotum) that became secondarily infected. Unlikely primary source for patient's bacteremia. Per patient, lesions have improved significantly since starting antibiotics.   - Lesions are already draining, no need for further I&D today   - Continue abx to cover staph   - Start Mupirocin ointment daily to the lesions   - Discussed diagnosis with patient. After secondary infection resolves, cysts will still remain. The only treatment for EICs is excision, though excision is not necessary if lesions do not bother patient. Patient can decide on further treatment when infection has resolved.

## 2018-11-25 NOTE — NURSING
MD informed that patient's heart rate and rhythm in the monitor show's inaccurate (VT, V fib and asystole). Patient is awake, alert and coherent. No complaint SOB nor chest pain. All electrodes were changed and relearned the rhythm.    MD came, assessed the patient and did interrogation of the AICD.

## 2018-11-25 NOTE — SUBJECTIVE & OBJECTIVE
Interval History: Pt w/ episode of VT this morning.  Blood cxs growing staph aureus susceptibility pending.      Review of Systems   Constitutional: Positive for fever. Negative for activity change, appetite change, chills and diaphoresis.   HENT: Negative for congestion.    Respiratory: Negative for cough, chest tightness and shortness of breath.    Cardiovascular: Negative for chest pain.   Gastrointestinal: Negative for abdominal distention, abdominal pain, diarrhea, nausea and vomiting.   Genitourinary: Positive for genital sores. Negative for difficulty urinating.   Musculoskeletal: Negative for arthralgias.   Neurological: Negative for tremors, syncope and speech difficulty.   Psychiatric/Behavioral: Negative for agitation.     Objective:     Vital Signs (Most Recent):  Temp: (!) 100.9 °F (38.3 °C) (11/25/18 1115)  Pulse: 83 (11/25/18 1200)  Resp: (!) 30 (11/25/18 1200)  BP: (!) 72/0 (11/25/18 1110)  SpO2: 100 % (11/25/18 1200) Vital Signs (24h Range):  Temp:  [99.3 °F (37.4 °C)-101 °F (38.3 °C)] 100.9 °F (38.3 °C)  Pulse:  [0-150] 83  Resp:  [10-41] 30  SpO2:  [90 %-100 %] 100 %  BP: ()/(0-90) 72/0     Weight: 78.5 kg (173 lb 1 oz)  Body mass index is 25.56 kg/m².    Estimated Creatinine Clearance: 54.4 mL/min (based on SCr of 1.3 mg/dL).    Physical Exam   Constitutional: He is oriented to person, place, and time. No distress.   HENT:   Mouth/Throat: Oropharynx is clear and moist.   Eyes: Pupils are equal, round, and reactive to light.   Neck: Neck supple. JVD present.   Cardiovascular:   Normal VAD hum    Pulmonary/Chest: Effort normal.   Abdominal: Soft.   Tenderness to palpation of the umbilicus area, no rebound/guarding, normoactive bowel sounds   Genitourinary:   Genitourinary Comments: Three boils present on testicles.  2 of which were openly draining   Musculoskeletal: He exhibits no edema.   Neurological: He is alert and oriented to person, place, and time. No cranial nerve deficit.   Skin:  Skin is warm and dry.   Psychiatric: He has a normal mood and affect. His behavior is normal.       Significant Labs:   Blood Culture:   Recent Labs   Lab 10/13/18  2007 11/23/18  1613 11/24/18  1215 11/24/18  1220   LABBLOO No growth after 5 days. Gram stain clement bottle: Gram positive cocci in clusters resembling Staph  Results called to and read back by: Sharlene Landon RN 11/24/2018  09:36  STAPHYLOCOCCUS AUREUS  ID consult required at [facility] and [facility].  For susceptibility see order #[id number]    Gram stain clement bottle: Gram positive cocci in clusters resembling Staph  Results called to and read back by: Sharlene Landon RN 11/24/2018  09:35  Gram stain aer bottle: Gram positive cocci in clusters resembling Staph  STAPHYLOCOCCUS AUREUS  Susceptibility pending  ID consult required at [facility] and [facility].   Gram stain aer bottle: Gram positive cocci in clusters resembling Staph   Results called to and read back by: Trinity Forbes RN 11/25/2018  06:35 Gram stain aer bottle: Gram positive cocci in clusters resembling Staph   Results called to and read back by: Trinity Forbes RN 11/25/2018  06:35     BMP:   Recent Labs   Lab 11/25/18  0705   *   *   K 4.3      CO2 20*   BUN 30*   CREATININE 1.3   CALCIUM 8.2*   MG 1.9     CBC:   Recent Labs   Lab 11/24/18  0346 11/25/18  0248 11/25/18  0850   WBC 20.64* 15.74* 19.66*   HGB 8.4* 9.4* 9.1*   HCT 29.0* 31.2* 30.9*   * 90* 127*     CMP:   Recent Labs   Lab 11/23/18  1817 11/23/18  2309 11/24/18  0346 11/25/18  0705   *  --  129* 130*   K 3.3*  --  4.9 4.3     --  105 104   CO2 20*  --  14* 20*   *  --  81 132*   BUN 23  --  27* 30*   CREATININE 1.4  --  1.4 1.3   CALCIUM 8.2*  --  8.0* 8.2*   PROT 5.9* 5.1*  --  5.7*   ALBUMIN 2.6* 2.3*  --  2.3*   BILITOT 2.2* 1.8*  --  1.2*   ALKPHOS 65 61  --  68   * 151*  --  142*   * 91*  --  105*    ANIONGAP 9  --  10 6*   EGFRNONAA 51.3*  --  51.3* 56.1*     Microbiology Results (last 7 days)     Procedure Component Value Units Date/Time    Blood culture [[id number]]     Order Status:  No result Specimen:  Blood     Blood culture [[id number]]     Order Status:  No result Specimen:  Blood     Blood culture x two cultures. Draw prior to antibiotics. [[id number]] Collected:  11/23/18 1613    Order Status:  Completed Specimen:  Blood from Peripheral, Antecubital, Left Updated:  11/25/18 0835     Blood Culture, Routine Gram stain clement bottle: Gram positive cocci in clusters resembling Staph     Blood Culture, Routine Results called to and read back by: Sharlene Landon RN 11/24/2018  09:36     Blood Culture, Routine --     STAPHYLOCOCCUS AUREUS  ID consult required at [facility] and [facility].  For susceptibility see order #[id number]      Narrative:       Aerobic and anaerobic    Blood culture x two cultures. Draw prior to antibiotics. [[id number]] Collected:  11/23/18 1613    Order Status:  Completed Specimen:  Blood from Peripheral, Hand, Left Updated:  11/25/18 0833     Blood Culture, Routine Gram stain clement bottle: Gram positive cocci in clusters resembling Staph     Blood Culture, Routine Results called to and read back by: Sharlene Landon RN 11/24/2018  09:35     Blood Culture, Routine Gram stain aer bottle: Gram positive cocci in clusters resembling Staph     Blood Culture, Routine --     STAPHYLOCOCCUS AUREUS  Susceptibility pending  ID consult required at [facility] and [facility]   locations.      Narrative:       Aerobic and anaerobic    Blood culture [[id number]] Collected:  11/24/18 1215    Order Status:  Completed Specimen:  Blood from Peripheral, Forearm, Right Updated:  11/25/18 0636     Blood Culture, Routine Gram stain aer bottle: Gram positive cocci in clusters resembling Staph      Blood Culture, Routine Results called to and read back by: Trinity Forbes RN  11/25/2018  06:35    Blood culture [[id number]] Collected:  11/24/18 1220    Order Status:  Completed Specimen:  Blood from Peripheral, Wrist, Right Updated:  11/25/18 0635     Blood Culture, Routine Gram stain aer bottle: Gram positive cocci in clusters resembling Staph      Blood Culture, Routine Results called to and read back by: Trinity Forbes RN 11/25/2018  06:35        Wound Culture:   Recent Labs   Lab 11/23/18  1448   LABAERO STAPHYLOCOCCUS AUREUS  Moderate  Susceptibility pending       All pertinent labs within the past 24 hours have been reviewed.  Recent Lab Results       11/25/18  0850   11/25/18  0705   11/25/18  0248   11/25/18  0140        Immature Granulocytes 0.5   0.5       Immature Grans (Abs) 0.10  Comment:  Mild elevation in immature granulocytes is non specific and   can be seen in a variety of conditions including stress response,   acute inflammation, trauma and pregnancy. Correlation with other   laboratory and clinical findings is essential.     0.08  Comment:  Mild elevation in immature granulocytes is non specific and   can be seen in a variety of conditions including stress response,   acute inflammation, trauma and pregnancy. Correlation with other   laboratory and clinical findings is essential.         Albumin   2.3         Alkaline Phosphatase   68         ALT   105         Anion Gap   6         aPTT     27.3  Comment:  aPTT therapeutic range = 39-69 seconds       AST   142         Baso # 0.05   0.04       Basophil% 0.3   0.3       Bilirubin, Direct   0.8         Total Bilirubin   1.2  Comment:  For infants and newborns, interpretation of results should be based  on gestational age, weight and in agreement with clinical  observations.  Premature Infant recommended reference ranges:  Up to 24 hours.............<8.0 mg/dL  Up to 48 hours............<12.0 mg/dL  3-5 days..................<15.0 mg/dL  6-29 days.................<15.0 mg/dL           BUN, Bld   30         Calcium   8.2          Chloride   104         CO2   20         Creatinine   1.3         Differential Method Automated   Automated       eGFR if    >60.0         eGFR if non    56.1  Comment:  Calculation used to obtain the estimated glomerular filtration  rate (eGFR) is the CKD-EPI equation.            Eos # 0.1   0.1       Eosinophil% 0.3   0.5       Glucose   132         Gran # (ANC) 18.0   14.7       Gran% 91.6   93.1       Hematocrit 30.9   31.2       Hemoglobin 9.1   9.4       Coumadin Monitoring INR 4.0  Comment:  Coumadin Therapy:  2.0 - 3.0 for INR for all indicators except mechanical heart valves  and antiphospholipid syndromes which should use 2.5 - 3.5.     3.8  Comment:  Coumadin Therapy:  2.0 - 3.0 for INR for all indicators except mechanical heart valves  and antiphospholipid syndromes which should use 2.5 - 3.5.         Lactate, Ramses 1.4  Comment:  Falsely low lactic acid results can be found in samples   containing >=13.0 mg/dL total bilirubin and/or >=3.5 mg/dL   direct bilirubin.             Lymph # 0.5   0.5       Lymph% 2.7   2.9       Magnesium   1.9         MCH 23.8   24.5       MCHC 29.4   30.1       MCV 81   82       Mono # 0.9   0.4       Mono% 4.6   2.7       MPV 11.7   SEE COMMENT  Comment:  Result not available.       nRBC 0   1       Phosphorus   1.5         Platelets 127   90       Potassium   4.3         Total Protein   5.7         Protime 38.6   37.3       RBC 3.82   3.83       RDW 18.6   19.0       Sodium   130         Vancomycin-Trough       21.4     WBC 19.66   15.74             Significant Imaging: I have reviewed all pertinent imaging results/findings within the past 24 hours.

## 2018-11-25 NOTE — PT/OT/SLP PROGRESS
Physical Therapy      Patient Name:  Suman Hayden   MRN:  [id number]    Patient not seen today. RN hold 2nd to medical instability (interrogatino of ICD; Vtach). Will follow-up when appropriate.    Phoebe Tapia, PT, DPT  11/25/2018  [phone]

## 2018-11-25 NOTE — ASSESSMENT & PLAN NOTE
-pt presented with fever, found to have leukocytosis, elevated lactate/CRP and mild INDIRA  -sources potentially: drive line infection (purulent discharge- sampled already and sent to lab; GS showed rare WBCs, CT abdomen showed small volume of fluid inferior aspect of VAD) vs skin infection (scrotal area with boils) vs UTI (pt with frequency, concentrated/dark urine)   -c/w broad spectrum antibiotics (Vancomycin/Cefepime)   -f/u BCx, lactate is wnl, procalc is elevated.  -ID consulted for rec's on antibiotic selection/management. On IV vanc, culture and sensitivities pending.  -Derm consulted for management of scrotal skin issue. Continue Mucinprocin   -Tylenol PRN (low dose, given elevated LFT's), Ice packs PRN

## 2018-11-25 NOTE — ASSESSMENT & PLAN NOTE
68M with NICM/LVAD HM3, hx of AFL with aberrancy, hx of VT on amiodarone presenting with worsening DLES. EP consulted for new onset WCT this AM; pt asymptomatic with HR at ~145 bpm. Manually pace-terminated with burst pacing (unsuccessful with 88% R-R, 84% R-R; successful 81% R-R).     -Continue amiodarone reload  -Device settings adjusted: VT tx zone from 188-240 bpm to 143-240 bpm. Therapy now Burst (8, 81% R-R), 20J, 35J x4. Previously Burst(3), Burst(3), no shocks.  -Continue to monitor on telemetry, will follow

## 2018-11-25 NOTE — PROGRESS NOTES
11/25/2018  Arsh Rogers    Current provider:  Carly Porras MD      I, Arsh Rogers, rounded on Suman Hayden to ensure all mechanical assist device settings (IABP or VAD) were appropriate and all parameters were within limits.  I was able to ensure all back up equipment was present, the staff had no issues, and the Perfusion Department daily rounding was complete.    7:46 AM

## 2018-11-25 NOTE — CONSULTS
[facility]  Cardiac Electrophysiology  Consult Note    Admission Date: 11/23/2018  Code Status: Full Code   Attending Provider: Angie Torres MD  Consulting Provider: Charbel Chavira MD  Principal Problem:Severe sepsis    Inpatient consult to Electrophysiology  Consult performed by: Charbel Chavira MD  Consult ordered by: Paige Mireles MD        Subjective:     Chief Complaint:  VT     HPI:   67 BM with NICM/HFrEF s/p HM3 as destination therapy, DC ICD s/p ICD revision 11/2017 (active RA/LV leads), hx of VT on amio, hx of AFL with aberrancy (shock 6/2018 thought secondary to AFL w/ aberrancy) who presented on 11/23 with worsening DLES infection and sepsis. EP consulted today for WCT in 140s. Last seen by Dr. Jacque Rodriguez in 7/2018- noted AFL and hx of VT. No plan for intervention at that time but if recurrent issues, would consider R sided flutter ablation. On interrogation of device, found to be in MMVT. Lower VT therapy zone set 188-240 bpm. Patient asymptomatic during episode.   On interrogation, patient was manually pace-terminated (bursts @ 88%, 84%, 81%--> successful conversion to AP-VS rhythm.     Past Medical History:   Diagnosis Date    Arthritis     Cardiomyopathy     CHF (congestive heart failure)     Coronary artery disease     Encounter for blood transfusion     Gastric polyp     Hyperlipidemia     Hypertension     Hypotension, iatrogenic     Iron deficiency anemia due to chronic blood loss 10/15/2018    LVAD (left ventricular assist device) present     Obesity     S/P implantation of automatic cardioverter/defibrillator (AICD)     Ventricular tachycardia        Past Surgical History:   Procedure Laterality Date    ABDOMINAL SURGERY      APPENDECTOMY      CARDIAC CATHETERIZATION      CARDIAC DEFIBRILLATOR PLACEMENT      CARDIAC DEFIBRILLATOR PLACEMENT      CLOSURE-STERNAL WOUND N/A 7/28/2017    Performed by Sunny Downing MD at [facility] OR [address] [place] [address]     COLONOSCOPY      COLONOSCOPY N/A 3/9/2018    Procedure: COLONOSCOPY;  Surgeon: Andres Chatterjee MD;  Location: [facility] (2ND FLR);  Service: Endoscopy;  Laterality: N/A;    COLONOSCOPY N/A 8/8/2018    Procedure: COLONOSCOPY;  Surgeon: Andres Chatterjee MD;  Location: [facility] (2ND FLR);  Service: Endoscopy;  Laterality: N/A;    COLONOSCOPY N/A 8/8/2018    Performed by Andres Chatterjee MD at [facility] ENDO (2ND FLR)    COLONOSCOPY N/A 3/9/2018    Performed by Andres Chatterjee MD at [facility] (2ND FLR)    DEVICE ASSISTED ENTEROSCOPY-ANTEROGRADE N/A 1/25/2018    Performed by Andres Chatterjee MD at [facility] (2ND FLR)    DEVICE ASSISTED ENTEROSCOPY-ANTEROGRADE N/A 12/14/2017    Performed by Andres Chatterjee MD at [facility] (2ND FLR)    EGD (ESOPHAGOGASTRODUODENOSCOPY) N/A 10/26/2018    Performed by Jessica Casillas MD at [facility] (2ND FLR)    EGD (ESOPHAGOGASTRODUODENOSCOPY) N/A 8/8/2018    Performed by Andres Chatterjee MD at [facility] (2ND FLR)    ESOPHAGOGASTRODUODENOSCOPY      ESOPHAGOGASTRODUODENOSCOPY N/A 8/8/2018    Procedure: EGD (ESOPHAGOGASTRODUODENOSCOPY);  Surgeon: Andres Chatterjee MD;  Location: [facility] (2ND FLR);  Service: Endoscopy;  Laterality: N/A;    ESOPHAGOGASTRODUODENOSCOPY N/A 10/26/2018    Procedure: EGD (ESOPHAGOGASTRODUODENOSCOPY);  Surgeon: Jessica Casillas MD;  Location: [facility] (2ND FLR);  Service: Endoscopy;  Laterality: N/A;  Push endoscopy    ESOPHAGOGASTRODUODENOSCOPY (EGD) N/A 3/6/2018    Performed by Andres Chatterjee MD at [facility] (2ND FLR)    ESOPHAGOGASTRODUODENOSCOPY (EGD) N/A 12/8/2017    Performed by Gagan Barger MD at [facility] ENDO (2ND FLR)    ESOPHAGOGASTRODUODENOSCOPY (EGD) N/A 8/17/2017    Performed by Kishore Mills MD at [facility] ENDO (2ND FLR)    EXPLORATORY-LAPAROTOMY with small bowel resection  N/A 3/13/2018    Performed by Kenyon Tellez MD at [facility] OR 2ND FLR    HEART CATH-RIGHT Right 7/3/2017    Performed by Angie Torres MD at [facility] CATH LAB     INSERTION-LEFT VENTRICULAR ASSIST DEVICE N/A 7/27/2017    Performed by [facility]ing [facility] at [facility] OR 2ND FLR    INSERTION-LEFT VENTRICULAR ASSIST DEVICE N/A 7/25/2017    Performed by Sunny Downing MD at [facility] OR 2ND FLR    LEFT VENTRICULAR ASSIST DEVICE  07/27/2017    PLACEMENT-NEOPERICARDIUM N/A 7/28/2017    Performed by Sunny Downing MD at [facility] OR 2ND FLR    RESECTION-BOWEL  3/13/2018    Performed by Kenyon Tellez MD at [facility] OR 2ND FLR    Retrograde deivce assisted enteroscopy N/A 1/26/2018    Performed by Jesse Davis MD at [facility] ENDO (2ND FLR)    REVISION-LEAD-ICD N/A 11/20/2017    Performed by Rubén Winchester MD at [facility] CATH LAB    TONSILLECTOMY      TRANSESOPHAGEAL ECHOCARDIOGRAM (GLENN) N/A 1/9/2018    Performed by [facility] Diagnostic Provider at [facility] CATH LAB       Review of patient's allergies indicates:   Allergen Reactions    Asa [aspirin] Other (See Comments)     Bleeding ulcer       No current facility-administered medications on file prior to encounter.      Current Outpatient Medications on File Prior to Encounter   Medication Sig    amiodarone (PACERONE) 200 MG Tab Take 2 tablets (400 mg total) by mouth once daily.    dronabinol (MARINOL) 5 MG capsule TAKE ONE CAPSULE BY MOUTH 3 TIMES DAILY BEFORE MEALS    ferrous sulfate 324 mg (65 mg iron) TbEC Take 1 tablet (325 mg total) by mouth 3 (three) times daily.    lisinopril (PRINIVIL,ZESTRIL) 5 MG tablet Take 1 tablet (5 mg total) by mouth once daily.    pantoprazole (PROTONIX) 40 MG tablet Take 1 tablet (40 mg total) by mouth 2 (two) times daily.    pravastatin (PRAVACHOL) 20 MG tablet Take 1 tablet (20 mg total) by mouth every evening.    spironolactone (ALDACTONE) 25 MG tablet Take 1 tablet (25 mg total) by mouth once daily.    warfarin (COUMADIN) 2 MG tablet Take 3mg (1 & 1/2) tabs on 11/2 only, followed by 2mg (1 tab) orally daily thereafter.    mirtazapine (REMERON) 7.5 MG Tab Take 1 tablet (7.5 mg total) by mouth  nightly.    polyethylene glycol (GLYCOLAX) 17 gram PwPk Take 17 g by mouth daily as needed (constipation).     Family History     Problem Relation (Age of Onset)    Heart disease Mother, Father        Tobacco Use    Smoking status: Never Smoker    Smokeless tobacco: Never Used   Substance and Sexual Activity    Alcohol use: No    Drug use: No    Sexual activity: Not Currently     Review of Systems   All other systems reviewed and are negative.    Objective:     Vital Signs (Most Recent):  Temp: 99.3 °F (37.4 °C) (11/25/18 0715)  Pulse: (!) 144 (11/25/18 0900)  Resp: 10 (11/25/18 0900)  BP: (!) 72/0 (11/25/18 0830)  SpO2: 96 % (11/25/18 0900) Vital Signs (24h Range):  Temp:  [99.3 °F (37.4 °C)-102.1 °F (38.9 °C)] 99.3 °F (37.4 °C)  Pulse:  [0-150] 144  Resp:  [10-41] 10  SpO2:  [90 %-98 %] 96 %  BP: ()/(0-90) 72/0       Weight: 78.5 kg (173 lb 1 oz)  Body mass index is 25.56 kg/m².    SpO2: 96 %  O2 Device (Oxygen Therapy): nasal cannula    Physical Exam   Constitutional: He is oriented to person, place, and time. No distress.   HENT:   Head: Atraumatic.   Mouth/Throat: No oropharyngeal exudate.   Eyes: EOM are normal. No scleral icterus.   Neck: Neck supple. No JVD present.   Cardiovascular:   vad hum   Pulmonary/Chest: Effort normal and breath sounds normal. No respiratory distress.   Abdominal: Soft. He exhibits no distension. There is tenderness.   Musculoskeletal: He exhibits no edema.   Neurological: He is alert and oriented to person, place, and time. No cranial nerve deficit.   Skin: Skin is warm and dry. No erythema.   Psychiatric: He has a normal mood and affect.       Significant Labs:   CMP:   Recent Labs   Lab 11/23/18  1426 11/23/18  1817 11/23/18  2309 11/24/18  0346 11/25/18  0705    133*  --  129* 130*   K 3.6 3.3*  --  4.9 4.3    104  --  105 104   CO2 21* 20*  --  14* 20*    151*  --  81 132*   BUN 23 23  --  27* 30*   CREATININE 1.6* 1.4  --  1.4 1.3   CALCIUM 8.7  8.2*  --  8.0* 8.2*   PROT 6.4 5.9* 5.1*  --   --    ALBUMIN 2.8* 2.6* 2.3*  --   --    BILITOT 2.4* 2.2* 1.8*  --   --    ALKPHOS 85 65 61  --   --    * 178* 151*  --   --    * 100* 91*  --   --    ANIONGAP 10 9  --  10 6*   ESTGFRAFRICA 50.4* 59.3*  --  59.3* >60.0   EGFRNONAA 43.6* 51.3*  --  51.3* 56.1*   , CBC:   Recent Labs   Lab 11/24/18 0346 11/25/18  0248 11/25/18  0850   WBC 20.64* 15.74* 19.66*   HGB 8.4* 9.4* 9.1*   HCT 29.0* 31.2* 30.9*   * 90* 127*    and INR:   Recent Labs   Lab 11/24/18 0346 11/25/18  0248 11/25/18  0850   INR 2.4* 3.8* 4.0*       Significant Imaging: Echocardiogram:   2D echo with color flow doppler:   Results for orders placed or performed during the hospital encounter of 10/24/18   2D echo with color flow doppler   Result Value Ref Range    QEF 10 (A) 55 - 65               Assessment and Plan:     VT (ventricular tachycardia)    68M with NICM/LVAD HM3, hx of AFL with aberrancy, hx of VT on amiodarone presenting with worsening DLES. EP consulted for new onset WCT this AM; pt asymptomatic with HR at ~145 bpm. Manually pace-terminated with burst pacing (unsuccessful with 88% R-R, 84% R-R; successful 81% R-R).     -Continue amiodarone reload  -Device settings adjusted: VT tx zone from 188-240 bpm to 143-240 bpm. Therapy now Burst (8, 81% R-R), 20J, 35J x4. Previously Burst(3), Burst(3), no shocks.  -Continue to monitor on telemetry, will follow         Thank you for your consult. I will follow-up with patient. Please contact us if you have any additional questions.    Charbel Chavira MD  Cardiac Electrophysiology  [facility]

## 2018-11-25 NOTE — SUBJECTIVE & OBJECTIVE
Past Medical History:   Diagnosis Date    Arthritis     Cardiomyopathy     CHF (congestive heart failure)     Coronary artery disease     Encounter for blood transfusion     Gastric polyp     Hyperlipidemia     Hypertension     Hypotension, iatrogenic     Iron deficiency anemia due to chronic blood loss 10/15/2018    LVAD (left ventricular assist device) present     Obesity     S/P implantation of automatic cardioverter/defibrillator (AICD)     Ventricular tachycardia        Past Surgical History:   Procedure Laterality Date    ABDOMINAL SURGERY      APPENDECTOMY      CARDIAC CATHETERIZATION      CARDIAC DEFIBRILLATOR PLACEMENT      CARDIAC DEFIBRILLATOR PLACEMENT      CLOSURE-STERNAL WOUND N/A 7/28/2017    Performed by Sunny Downing MD at [facility] OR 2ND FLR    COLONOSCOPY      COLONOSCOPY N/A 3/9/2018    Procedure: COLONOSCOPY;  Surgeon: Andres Chatterjee MD;  Location: [facility] (2ND FLR);  Service: Endoscopy;  Laterality: N/A;    COLONOSCOPY N/A 8/8/2018    Procedure: COLONOSCOPY;  Surgeon: Andres Chatterjee MD;  Location: [facility] (2ND FLR);  Service: Endoscopy;  Laterality: N/A;    COLONOSCOPY N/A 8/8/2018    Performed by Andres Chatterjee MD at [facility] ENDO (2ND FLR)    COLONOSCOPY N/A 3/9/2018    Performed by Andres Chatterjee MD at [facility] (2ND FLR)    DEVICE ASSISTED ENTEROSCOPY-ANTEROGRADE N/A 1/25/2018    Performed by Andres Chatterjee MD at [facility] ENDO (2ND FLR)    DEVICE ASSISTED ENTEROSCOPY-ANTEROGRADE N/A 12/14/2017    Performed by Andres Chatterjee MD at [facility] (2ND FLR)    EGD (ESOPHAGOGASTRODUODENOSCOPY) N/A 10/26/2018    Performed by Jessica Casillas MD at [facility] ENDO (2ND FLR)    EGD (ESOPHAGOGASTRODUODENOSCOPY) N/A 8/8/2018    Performed by Andres Chatterjee MD at [facility] (2ND FLR)    ESOPHAGOGASTRODUODENOSCOPY      ESOPHAGOGASTRODUODENOSCOPY N/A 8/8/2018    Procedure: EGD (ESOPHAGOGASTRODUODENOSCOPY);  Surgeon: Andres Chatterjee MD;  Location: [facility] (2ND FLR);  Service:  Endoscopy;  Laterality: N/A;    ESOPHAGOGASTRODUODENOSCOPY N/A 10/26/2018    Procedure: EGD (ESOPHAGOGASTRODUODENOSCOPY);  Surgeon: Jessica Casillas MD;  Location: [facility] (2ND FLR);  Service: Endoscopy;  Laterality: N/A;  Push endoscopy    ESOPHAGOGASTRODUODENOSCOPY (EGD) N/A 3/6/2018    Performed by Andres Chatterjee MD at [facility] ENDO (2ND FLR)    ESOPHAGOGASTRODUODENOSCOPY (EGD) N/A 12/8/2017    Performed by Gagan Barger MD at [facility] ENDO (2ND FLR)    ESOPHAGOGASTRODUODENOSCOPY (EGD) N/A 8/17/2017    Performed by Kishore Mills MD at [facility] (2ND FLR)    EXPLORATORY-LAPAROTOMY with small bowel resection  N/A 3/13/2018    Performed by Kenyon Tellez MD at [facility] OR 2ND FLR    HEART CATH-RIGHT Right 7/3/2017    Performed by Angie Torres MD at [facility] CATH LAB    INSERTION-LEFT VENTRICULAR ASSIST DEVICE N/A 7/27/2017    Performed by Sunny Downing MD at [facility] OR 2ND FLR    INSERTION-LEFT VENTRICULAR ASSIST DEVICE N/A 7/25/2017    Performed by Sunny Downing MD at [facility] OR 2ND FLR    LEFT VENTRICULAR ASSIST DEVICE  07/27/2017    PLACEMENT-NEOPERICARDIUM N/A 7/28/2017    Performed by Sunny Downing MD at [facility] OR 2ND FLR    RESECTION-BOWEL  3/13/2018    [facility]    Retrograde deivce assisted enteroscopy N/A 1/26/2018    Performed by Jesse Davis MD at [facility] (2ND FLR)    REVISION-LEAD-ICD N/A 11/20/2017    Performed by Rubén Winchester MD at [facility] CATH LAB    TONSILLECTOMY      TRANSESOPHAGEAL ECHOCARDIOGRAM (GLENN) N/A 1/9/2018    Performed by [facility] Diagnostic Provider at [facility] CATH LAB       Review of patient's allergies indicates:   Allergen Reactions    Asa [aspirin] Other (See Comments)     Bleeding ulcer       No current facility-administered medications on file prior to encounter.      Current Outpatient Medications on File Prior to Encounter   Medication Sig    amiodarone (PACERONE) 200 MG Tab Take 2 tablets (400 mg total) by mouth  once daily.    dronabinol (MARINOL) 5 MG capsule TAKE ONE CAPSULE BY MOUTH 3 TIMES DAILY BEFORE MEALS    ferrous sulfate 324 mg (65 mg iron) TbEC Take 1 tablet (325 mg total) by mouth 3 (three) times daily.    lisinopril (PRINIVIL,ZESTRIL) 5 MG tablet Take 1 tablet (5 mg total) by mouth once daily.    pantoprazole (PROTONIX) 40 MG tablet Take 1 tablet (40 mg total) by mouth 2 (two) times daily.    pravastatin (PRAVACHOL) 20 MG tablet Take 1 tablet (20 mg total) by mouth every evening.    spironolactone (ALDACTONE) 25 MG tablet Take 1 tablet (25 mg total) by mouth once daily.    warfarin (COUMADIN) 2 MG tablet Take 3mg (1 & 1/2) tabs on 11/2 only, followed by 2mg (1 tab) orally daily thereafter.    mirtazapine (REMERON) 7.5 MG Tab Take 1 tablet (7.5 mg total) by mouth nightly.    polyethylene glycol (GLYCOLAX) 17 gram PwPk Take 17 g by mouth daily as needed (constipation).     Family History     Problem Relation (Age of Onset)    Heart disease Mother, Father        Tobacco Use    Smoking status: Never Smoker    Smokeless tobacco: Never Used   Substance and Sexual Activity    Alcohol use: No    Drug use: No    Sexual activity: Not Currently     Review of Systems   All other systems reviewed and are negative.    Objective:     Vital Signs (Most Recent):  Temp: 99.3 °F (37.4 °C) (11/25/18 0715)  Pulse: (!) 144 (11/25/18 0900)  Resp: 10 (11/25/18 0900)  BP: (!) 72/0 (11/25/18 0830)  SpO2: 96 % (11/25/18 0900) Vital Signs (24h Range):  Temp:  [99.3 °F (37.4 °C)-102.1 °F (38.9 °C)] 99.3 °F (37.4 °C)  Pulse:  [0-150] 144  Resp:  [10-41] 10  SpO2:  [90 %-98 %] 96 %  BP: ()/(0-90) 72/0       Weight: 78.5 kg (173 lb 1 oz)  Body mass index is 25.56 kg/m².    SpO2: 96 %  O2 Device (Oxygen Therapy): nasal cannula    Physical Exam   Constitutional: He is oriented to person, place, and time. No distress.   HENT:   Head: Atraumatic.   Mouth/Throat: No oropharyngeal exudate.   Eyes: EOM are normal. No scleral  icterus.   Neck: Neck supple. No JVD present.   Cardiovascular:   vad hum   Pulmonary/Chest: Effort normal and breath sounds normal. No respiratory distress.   Abdominal: Soft. He exhibits no distension. There is tenderness.   Musculoskeletal: He exhibits no edema.   Neurological: He is alert and oriented to person, place, and time. No cranial nerve deficit.   Skin: Skin is warm and dry. No erythema.   Psychiatric: He has a normal mood and affect.       Significant Labs:   CMP:   Recent Labs   Lab 11/23/18  1426 11/23/18  1817 11/23/18  2309 11/24/18  0346 11/25/18  0705    133*  --  129* 130*   K 3.6 3.3*  --  4.9 4.3    104  --  105 104   CO2 21* 20*  --  14* 20*    151*  --  81 132*   BUN 23 23  --  27* 30*   CREATININE 1.6* 1.4  --  1.4 1.3   CALCIUM 8.7 8.2*  --  8.0* 8.2*   PROT 6.4 5.9* 5.1*  --   --    ALBUMIN 2.8* 2.6* 2.3*  --   --    BILITOT 2.4* 2.2* 1.8*  --   --    ALKPHOS 85 65 61  --   --    * 178* 151*  --   --    * 100* 91*  --   --    ANIONGAP 10 9  --  10 6*   ESTGFRAFRICA 50.4* 59.3*  --  59.3* >60.0   EGFRNONAA 43.6* 51.3*  --  51.3* 56.1*   , CBC:   Recent Labs   Lab 11/24/18  0346 11/25/18  0248 11/25/18  0850   WBC 20.64* 15.74* 19.66*   HGB 8.4* 9.4* 9.1*   HCT 29.0* 31.2* 30.9*   * 90* 127*    and INR:   Recent Labs   Lab 11/24/18  0346 11/25/18  0248 11/25/18  0850   INR 2.4* 3.8* 4.0*       Significant Imaging: Echocardiogram:   2D echo with color flow doppler:   Results for orders placed or performed during the hospital encounter of 10/24/18   2D echo with color flow doppler   Result Value Ref Range    QEF 10 (A) 55 - 65

## 2018-11-25 NOTE — HPI
67 BM with NICM/HFrEF s/p HM3 as destination therapy, DC ICD s/p ICD revision 11/2017 (active RA/LV leads), hx of VT on amio, hx of AFL with aberrancy (shock 6/2018 thought secondary to AFL w/ aberrancy) who presented on 11/23 with worsening DLES infection and sepsis. EP consulted today for WCT in 140s. Last seen by Dr. Jacque Rodriguez in 7/2018- noted AFL and hx of VT. No plan for intervention at that time but if recurrent issues, would consider R sided flutter ablation. On interrogation of device, found to be in MMVT. Lower VT therapy zone set 188-240 bpm. Patient asymptomatic during episode.   On interrogation, patient was manually pace-terminated (bursts @ 88%, 84%, 81%--> successful conversion to AP-VS rhythm.

## 2018-11-25 NOTE — NURSING
Notified Dr. Mireles with HTS of inability to obtain doppler pressure with multiple attempts, EKG rhythm on monitor ranging from Vtach-vfib and asystole. Troubleshooting to monitor and wires completed with no resolution. Pt remains at baseline neurological status and is awake and alert and following commands. MD to come to bedside to interrogate ICD device.

## 2018-11-25 NOTE — PROCEDURES
Date of Procedure: 11/25/2018    PRE-TEST DATA   DEVICES:  ICD, DUAL  [employer] [id number] ICD EVERA MRI S  [id number] S/N:[id number]  Mary Winchester  11/20/2017 - current     right atrium LEAD  [employer] 5076 CapSure Fix Novus  S/N:[id number]  3/16/2017 - current     LEAD  [employer] 4196 Attain S/N:[id number]  1/26/2016 - current     right ventricle LEAD  [employer] 6947 Sprint Quattro Secure S/N:[id number]  1/31/2008 - current     LEAD  MEDTRONIC 5076-52 CAPSURE FIX NOVUS 52CM S/N:[id number]  1/31/2008 - capped     The following physician is MARY Whittaker    active RA/LV leads  RV pacing lead capped during revision    TEST DESCRIPTION   Follow-Up Analysis:  Chamber type: Dual  Mode: AAI <=> DDD    Lower limit rate is 80 bpm  Upper tracking rate is 125 bpm  Max sensor rate is 120 bpm    Paced AV delay/Sensed AV delay: 180/150 ms  VV delay: LV > RV    TACHY ZONES:  VT     Rate: 188-240 bpm       Available therapies: Burst(3), Burst(3), some Off  VF     Rate: > 240 bpm       Available therapies: ATP during charging and Shock (35J x6).    Estimated longevity: 7.5 years  HV / SVC impedance: 28 / 39 Ohm  Mode switch: Yes    LEADS:  RA Lead       P-wave: Paced       Impedance: 399 Ohms       Paced: 99%    LV Lead       R-wave: 17.4 mV       Impedance: 456 Ohms       Paced: 32% (Since 11/24/2018, MVP On)    THRESHOLDS:  RA Lead     1.0 V at 0.4 ms      Bi-polar  LV Lead     1.5 V at 0.4 ms      Bi-polar    EPISODES:  8 episodes detected, longest 61 min  The patient had 0 device-treated episodes.    On interrogation, the patient was in MMVT  ms. Manual ATP performed, Burst @ 88% R-R, then Burst at 84% R-R, then Burst at 81% R-R with successful conversion to AP-VS rhythm.    Reprogramming Comments:  Changed VT treatment zone from 182-240 bpm to 143-240 bpm. Changed ATP to Burst(8), starting at 81% R-R interval. Added 20J, followed by 35J x4 defibrillation after ATP for VT zone.    Discussed with  Dr. Owen.    Charbel Chavira MD  Cardiology Fellow PGY-5  Pager: [phone]

## 2018-11-25 NOTE — PROGRESS NOTES
[facility]  Heart Transplant  Progress Note    Patient Name: Suman Hayden  MRN: [id number]  Admission Date: 11/23/2018  Hospital Length of Stay: 2 days  Attending Physician: Angie Torres MD  Primary Care Provider: Joe Ernst MD  Principal Problem:Severe sepsis    Subjective:     Interval History:  On IV abx. This morning, nursing staff noted that it was hard to obtain and manual blood pressure and that the patient's telemetry was hard to read due to artifact. Patient was asymptomatic and awake and interactive and was responsive appropriately. I interrogated the device. Patient was found to be in VT at a rhythm of 158 bpm. Amiodarone was started. Due to the limited number of IV's, we will place a line for more IV access. ID and Derm consults appreciated.    Continuous Infusions:   amiodarone in dextrose 5% 1 mg/min (11/25/18 1100)    amiodarone in dextrose 5%       Scheduled Meds:   dronabinol  5 mg Oral BID    ferrous sulfate  325 mg Oral TID    lisinopril  5 mg Oral Daily    mupirocin   Topical (Top) Daily    pantoprazole  40 mg Oral BID    vancomycin (VANCOCIN) IVPB  1,000 mg Intravenous Q12H    warfarin  1 mg Oral Daily     PRN Meds:acetaminophen    Review of patient's allergies indicates:   Allergen Reactions    Asa [aspirin] Other (See Comments)     Bleeding ulcer     Objective:     Vital Signs (Most Recent):  Temp: (!) 100.9 °F (38.3 °C) (11/25/18 1115)  Pulse: 80 (11/25/18 1115)  Resp: (!) 26 (11/25/18 1115)  BP: (!) 72/0 (11/25/18 1110)  SpO2: 96 % (11/25/18 1110) Vital Signs (24h Range):  Temp:  [99.3 °F (37.4 °C)-102.1 °F (38.9 °C)] 100.9 °F (38.3 °C)  Pulse:  [0-150] 80  Resp:  [10-41] 26  SpO2:  [90 %-98 %] 96 %  BP: ()/(0-90) 72/0     Patient Vitals for the past 72 hrs (Last 3 readings):   Weight   11/24/18 0911 78.5 kg (173 lb 1 oz)   11/24/18 0910 78.5 kg (173 lb 1 oz)   11/23/18 2100 78.8 kg (173 lb 11.6 oz)     Body mass index is 25.56  kg/m².      Intake/Output Summary (Last 24 hours) at 11/25/2018 1128  Last data filed at 11/25/2018 1100  Gross per 24 hour   Intake 1054.94 ml   Output 850 ml   Net 204.94 ml       Hemodynamic Parameters:       Telemetry:     Physical Exam   Constitutional: He is oriented to person, place, and time. No distress.   HENT:   Mouth/Throat: Oropharynx is clear and moist.   Eyes: Pupils are equal, round, and reactive to light.   Neck: Neck supple. JVD present.   Cardiovascular:   Normal VAD hum    Pulmonary/Chest: Effort normal.   Abdominal: Soft. There is no tenderness.   Musculoskeletal: He exhibits no edema.   Neurological: He is alert and oriented to person, place, and time. No cranial nerve deficit.   Skin: Skin is warm and dry.   Psychiatric: He has a normal mood and affect. His behavior is normal.       Significant Labs:  CBC:  Recent Labs   Lab 11/24/18  0346 11/25/18  0248 11/25/18  0850   WBC 20.64* 15.74* 19.66*   RBC 3.53* 3.83* 3.82*   HGB 8.4* 9.4* 9.1*   HCT 29.0* 31.2* 30.9*   * 90* 127*   MCV 82 82 81*   MCH 23.8* 24.5* 23.8*   MCHC 29.0* 30.1* 29.4*     BNP:  Recent Labs   Lab 11/23/18  1426 11/23/18  1612   BNP 1,873* 1,204*     CMP:  Recent Labs   Lab 11/23/18  1817 11/23/18  2309 11/24/18  0346 11/25/18  0705   *  --  81 132*   CALCIUM 8.2*  --  8.0* 8.2*   ALBUMIN 2.6* 2.3*  --  2.3*   PROT 5.9* 5.1*  --  5.7*   *  --  129* 130*   K 3.3*  --  4.9 4.3   CO2 20*  --  14* 20*     --  105 104   BUN 23  --  27* 30*   CREATININE 1.4  --  1.4 1.3   ALKPHOS 65 61  --  68   * 91*  --  105*   * 151*  --  142*   BILITOT 2.2* 1.8*  --  1.2*      Coagulation:   Recent Labs   Lab 11/24/18  0346 11/25/18  0248 11/25/18  0850   INR 2.4* 3.8* 4.0*   APTT 32.5* 27.3  --      LDH:  Recent Labs   Lab 11/23/18  1426 11/24/18  0346   * 257     Microbiology:  Microbiology Results (last 7 days)     Procedure Component Value Units Date/Time    Blood culture [[id number]]      Order Status:  No result Specimen:  Blood     Blood culture [[id number]]     Order Status:  No result Specimen:  Blood     Blood culture x two cultures. Draw prior to antibiotics. [[id number]] Collected:  11/23/18 1613    Order Status:  Completed Specimen:  Blood from Peripheral, Antecubital, Left Updated:  11/25/18 0835     Blood Culture, Routine Gram stain clement bottle: Gram positive cocci in clusters resembling Staph     Blood Culture, Routine Results called to and read back by: Sharlene Landon RN 11/24/2018  09:36     Blood Culture, Routine --     STAPHYLOCOCCUS AUREUS  ID consult required at [facility].  For susceptibility see order #[id number]      Narrative:       Aerobic and anaerobic    Blood culture x two cultures. Draw prior to antibiotics. [[id number]] Collected:  11/23/18 1613    Order Status:  Completed Specimen:  Blood from Peripheral, Hand, Left Updated:  11/25/18 0833     Blood Culture, Routine Gram stain clement bottle: Gram positive cocci in clusters resembling Staph     Blood Culture, Routine Results called to and read back by: Sharlene Landon RN 11/24/2018  09:35     Blood Culture, Routine Gram stain aer bottle: Gram positive cocci in clusters resembling Staph     Blood Culture, Routine --     STAPHYLOCOCCUS AUREUS  Susceptibility pending  ID consult required at [facility].      Narrative:       Aerobic and anaerobic    Blood culture [[id number]] Collected:  11/24/18 1215    Order Status:  Completed Specimen:  Blood from Peripheral, Forearm, Right Updated:  11/25/18 0636     Blood Culture, Routine Gram stain aer bottle: Gram positive cocci in clusters resembling Staph      Blood Culture, Routine Results called to and read back by: Trinity Forbes RN 11/25/2018  06:35    Blood culture [[id number]] Collected:  11/24/18 1220    Order Status:  Completed Specimen:  Blood from Peripheral, Wrist, Right Updated:  11/25/18 0635      Blood Culture, Routine Gram stain aer bottle: Gram positive cocci in clusters resembling Staph      Blood Culture, Routine Results called to and read back by: Trinity Forbes RN 11/25/2018  06:35          I have reviewed all pertinent labs within the past 24 hours.    Estimated Creatinine Clearance: 54.4 mL/min (based on SCr of 1.3 mg/dL).    Diagnostic Results:  I have reviewed all pertinent imaging results/findings within the past 24 hours.    Assessment and Plan:     68 year old male with PMHx significant for HFrEF secondary to non ischemic cardiomyopathy underwent Heartmate  3 LVAD implanted as DT therapy 7/27/17 and VT on amiodarone s/p ICD (with revision in 11/2017 complicated by pocket hematoma). Presents to ED today from clinic for worsening DLES infection and fever. Patient seen by VAD nurse and ID staff who feel admission to [facility] with IV antibiotics warranted. Patient also with fevers, chills, decreased appetite, scrotal boils with spontaneous purulent discharge/rupture, urinary incontinence and confusion at home. History provided by patient and supplemented by his wife. He denies recent travel or being around sick contacts. He is complaint with taking his home medications. Of note, he was recently hospitalized for GIB, history of ileal ulcer s/p intervention. Being followed also by EP by Dr. Winchester for possible future VT ablation. Pt denies recent ICD shocks as well as low flow alarms from his VAD.  In the ER, he was found febrile with Tmax of 103 deg F, doppler BP 60-70's and HR 70s. He was given 2 Liters NS IVFs, cefepime/vancomycin and resumed on home meds except aldactone. Blood cultures sent as well as gram stain and culture of specimen from drive line. CXR showed left small pleural effusion and CT abdomen performed that showed small volume fluid inferior aspect of LVAD along with small pericardial effusion. Labs notable for leukocytosis, mild INDIRA, elevated AST/ALT/T.bili, BNP and lactate 2.7 as well  as CRP of 215. LDH was 294. VAD was interrogated and noted to have PI events. Pt was admitted to the ICU for further management/care.     * Severe sepsis    -pt presented with fever, found to have leukocytosis, elevated lactate/CRP and mild INDIRA  -sources potentially: drive line infection (purulent discharge- sampled already and sent to lab; GS showed rare WBCs, CT abdomen showed small volume of fluid inferior aspect of VAD) vs skin infection (scrotal area with boils) vs UTI (pt with frequency, concentrated/dark urine)   -c/w broad spectrum antibiotics (Vancomycin/Cefepime)   -f/u BCx, lactate is wnl, procalc is elevated.  -ID consulted for rec's on antibiotic selection/management. On IV vanc, culture and sensitivities pending.  -Derm consulted for management of scrotal skin issue. Continue Mucinprocin   -Tylenol PRN (low dose, given elevated LFT's), Ice packs PRN      Transaminitis    -likely due to sepsis vs effects of amiodarone  -will trend LFT's   -CT abdomen: liver is normal in size and attenuation without focal hepatic lesion.  There is gallbladder sludge.  The spleen, adrenal glands, and pancreas are unremarkable.   -holding home pravastatin     Infection, skin    -worsening DLES infection with fever 102  - Send blood culutres  - ID consult, will start broad spectrum abx with vanc and cefepime (given one dose of each in ED already)  -will get imaging with CT to look for further infection, abscess      INDIRA (acute kidney injury)    -mild elevation in serum Cr (1.4, from baseline 1.2)  -holding home aldactone, s/p IVF's (2L NS)   -encourage PO fluid intake, no more IVF's   -bladder scan q6hrs, PRN ISC for PVR >300 cc       Iron deficiency anemia due to chronic blood loss    -c/w home oral iron supplementation tablets     LVAD (left ventricular assist device) present    -HM3 5200 rpm, DT therapy  -Not on home diuretics  -INR supratherapeutic currently, Continue coumadin for goal INR 2-3, off aspirin due to  multiple GI bleeds (no heparin gtt) No bridge. Holding today.  -    Procedure: Device Interrogation Including analysis of device parameters  Current Settings: Ventricular Assist Device  Review of device function is stable/unstable stable    TXP LVAD INTERROGATIONS 11/25/2018 11/25/2018 11/25/2018 11/25/2018 11/25/2018 11/25/2018 11/25/2018   Type HeartMate3 HeartMate3 HeartMate3 HeartMate3 HeartMate3 HeartMate3 HeartMate3   Flow 4.4 4.1 4.5 4.5 4.3 4.6 4.5   Speed 5200 5200 5200 5200 5200 5200 5200   PI 2.8 3.8 2.2 2 1.8 2 2.1   Power ([place]) 3.6 3.6 3.5 3.5 3.5 3.6 3.6   LSL - - - - - - -   Pulsatility Intermittent pulse Intermittent pulse Intermittent pulse Intermittent pulse - - -        Chronic combined systolic and diastolic congestive heart failure    -pt presently initially dry with sepsis, s/p IVF hydration, labs notable for elevated BNP, CXR and CT show left pleural effusion, pt saturating well on 1 Liter oxygen via NC   -c/w home lisinopril, holding home aldactone b/c of elevated serum Cr.     VT (ventricular tachycardia)    - EP consulted.  - Successfully ATP'd out of VT  - Continuing Amio gtt.   - Increased ATP burst intervals and lowered detection zone  - Activated tachytherapies as well see EP note.      Essential hypertension    -c/w home lisinopril, currently holding home aldactone  -goal MAP less than 90  - slightly hypotensive. Will discuss 250 cc bolus of IVF         Paige Mireles MD  Heart Transplant  [facility]

## 2018-11-25 NOTE — ASSESSMENT & PLAN NOTE
-HM3 5200 rpm, DT therapy  -Not on home diuretics  -INR supratherapeutic currently, Continue coumadin for goal INR 2-3, off aspirin due to multiple GI bleeds (no heparin gtt) No bridge. Holding today.  -    Procedure: Device Interrogation Including analysis of device parameters  Current Settings: Ventricular Assist Device  Review of device function is stable/unstable stable    TXP LVAD INTERROGATIONS 11/25/2018 11/25/2018 11/25/2018 11/25/2018 11/25/2018 11/25/2018 11/25/2018   Type HeartMate3 HeartMate3 HeartMate3 HeartMate3 HeartMate3 HeartMate3 HeartMate3   Flow 4.4 4.1 4.5 4.5 4.3 4.6 4.5   Speed 5200 5200 5200 5200 5200 5200 5200   PI 2.8 3.8 2.2 2 1.8 2 2.1   Power ([place]) 3.6 3.6 3.5 3.5 3.5 3.6 3.6   LSL - - - - - - -   Pulsatility Intermittent pulse Intermittent pulse Intermittent pulse Intermittent pulse - - -

## 2018-11-25 NOTE — ASSESSMENT & PLAN NOTE
68 year old male with PMHx significant for HFrEF secondary to non ischemic cardiomyopathy underwent Heartmate 3 LVAD implanted as DT therapy 7/27/17 admitted from clinic for worsening DLES infection (purulent discharge and blood tinged) and fever. Patient also with fevers, chills, decreased appetite, scrotal boils with spontaneous purulent discharge/rupture, urinary incontinence and confusion at home for the past three days. Blood cultures sent as well as gram stain and culture of specimen from drive line.     CT abdomen performed that showed small volume fluid inferior aspect of LVAD. CRP of 215. Elevated WBC   LVAD DLES- showing Staph Aureus.  Blood cxs and repeats showing- Staph Aureus.  Pt and wife report improvement since the start of abx.  3 boils located on scrotum w/ drainage.  Dermatology seen today report likely epidermal inclusion cysts that became secondarily infected; recommended mupirocin ointment daily to the lesions.   Pt continues to spike F and have elevated WBC.    -Continue pt on Vancomycin  -Repeat blood cxs ordered  - 2d echo scheduled for today  - Will continue to follow susceptibilities  -Pt will likely need long term IV abx  -ID will continue to follow.

## 2018-11-25 NOTE — PROGRESS NOTES
[facility]  Infectious Disease  Progress Note    Patient Name: Suman Hayden  MRN: [id number]  Admission Date: 11/23/2018  Length of Stay: 2 days  Attending Physician: Angie Torres MD  Primary Care Provider: Joe Ernst MD    Isolation Status: No active isolations  Assessment/Plan:      * Severe sepsis    68 year old male with PMHx significant for HFrEF secondary to non ischemic cardiomyopathy underwent Heartmate 3 LVAD implanted as DT therapy 7/27/17 admitted from clinic for worsening DLES infection (purulent discharge and blood tinged) and fever. Patient also with fevers, chills, decreased appetite, scrotal boils with spontaneous purulent discharge/rupture, urinary incontinence and confusion at home for the past three days. Blood cultures sent as well as gram stain and culture of specimen from drive line.     CT abdomen performed that showed small volume fluid inferior aspect of LVAD. CRP of 215. Elevated WBC   LVAD DLES- showing Staph Aureus.  Blood cxs and repeats showing- Staph Aureus.  Pt and wife report improvement since the start of abx.  3 boils located on scrotum w/ drainage.  Dermatology seen today report likely epidermal inclusion cysts that became secondarily infected; recommended mupirocin ointment daily to the lesions.   Pt continues to spike F and have elevated WBC.    -Continue pt on Vancomycin  -Repeat blood cxs ordered  - 2d echo scheduled for today  - Will continue to follow susceptibilities  -Pt will likely need long term IV abx  -ID will continue to follow.         Thank you for your consult. I will follow-up with patient. Please contact us if you have any additional questions.    Ricardo Freeman PA-C  Infectious Disease  [facility]    Subjective:     Principal Problem:Severe sepsis    HPI: 68 year old male with PMHx significant for HFrEF secondary to non ischemic cardiomyopathy underwent Heartmate 3 LVAD implanted as DT therapy  7/27/17, (NYHA class II) and VT on amiodarone s/p ICD (with revision in 11/2017 complicated by pocket hematoma, and prior hospitalization for ICD shocks secondary to atrial tachycardia with abberancy). Presents to ED today from clinic for worsening DLES infection (purulent discharge and blood tinged) and fever. Patient seen by VAD nurse and ID staff who felt admission to hospitals with IV antibiotics warranted. Patient also with fevers, chills, decreased appetite, scrotal boils with spontaneous purulent discharge/rupture, urinary incontinence and confusion at home for the past three days. In the ER, he was found febrile with Tmax of 103 deg F, doppler BP 60-70's and HR 70s. He was given 2 Liters NS IVFs, cefepime/vancomycin and resumed on home meds except aldactone. Blood cultures sent as well as gram stain and culture of specimen from drive line. CXR showed left small pleural effusion and CT abdomen performed that showed small volume fluid inferior aspect of LVAD along with small pericardial effusion. Labs notable for leukocytosis, mild INDIRA, elevated AST/ALT/T.bili, BNP and lactate 2.7 as well as CRP of 215. LDH was 294.  LVAD DLES- showing Staph Aureus.  Blood cxs showing- gram positive coccis.  Pt and wife today reports much improvement since the start of abx.        Interval History: Pt w/ episode of VT this morning.  Blood cxs growing staph aureus susceptibility pending.      Review of Systems   Constitutional: Positive for fever. Negative for activity change, appetite change, chills and diaphoresis.   HENT: Negative for congestion.    Respiratory: Negative for cough, chest tightness and shortness of breath.    Cardiovascular: Negative for chest pain.   Gastrointestinal: Negative for abdominal distention, abdominal pain, diarrhea, nausea and vomiting.   Genitourinary: Positive for genital sores. Negative for difficulty urinating.   Musculoskeletal: Negative for arthralgias.   Neurological: Negative for tremors,  syncope and speech difficulty.   Psychiatric/Behavioral: Negative for agitation.     Objective:     Vital Signs (Most Recent):  Temp: (!) 100.9 °F (38.3 °C) (11/25/18 1115)  Pulse: 83 (11/25/18 1200)  Resp: (!) 30 (11/25/18 1200)  BP: (!) 72/0 (11/25/18 1110)  SpO2: 100 % (11/25/18 1200) Vital Signs (24h Range):  Temp:  [99.3 °F (37.4 °C)-101 °F (38.3 °C)] 100.9 °F (38.3 °C)  Pulse:  [0-150] 83  Resp:  [10-41] 30  SpO2:  [90 %-100 %] 100 %  BP: ()/(0-90) 72/0     Weight: 78.5 kg (173 lb 1 oz)  Body mass index is 25.56 kg/m².    Estimated Creatinine Clearance: 54.4 mL/min (based on SCr of 1.3 mg/dL).    Physical Exam   Constitutional: He is oriented to person, place, and time. No distress.   HENT:   Mouth/Throat: Oropharynx is clear and moist.   Eyes: Pupils are equal, round, and reactive to light.   Neck: Neck supple. JVD present.   Cardiovascular:   Normal VAD hum    Pulmonary/Chest: Effort normal.   Abdominal: Soft.   Tenderness to palpation of the umbilicus area, no rebound/guarding, normoactive bowel sounds   Genitourinary:   Genitourinary Comments: Three boils present on testicles.  2 of which were openly draining   Musculoskeletal: He exhibits no edema.   Neurological: He is alert and oriented to person, place, and time. No cranial nerve deficit.   Skin: Skin is warm and dry.   Psychiatric: He has a normal mood and affect. His behavior is normal.       Significant Labs:   Blood Culture:   Recent Labs   Lab 10/13/18  2007 11/23/18  1613 11/24/18  1215 11/24/18  1220   LABBLOO No growth after 5 days. Gram stain clement bottle: Gram positive cocci in clusters resembling Staph  Results called to and read back by: Sharlene Landon RN 11/24/2018  09:36  STAPHYLOCOCCUS AUREUS  ID consult required at [facility] and [facility].  For susceptibility see order #[id number]    Gram stain clement bottle: Gram positive cocci in clusters resembling Staph  Results called to and read back by: Sharlene  Dipesh GASTELUM 11/24/2018  09:35  Gram stain aer bottle: Gram positive cocci in clusters resembling Staph  STAPHYLOCOCCUS AUREUS  Susceptibility pending  ID consult required at [facility],[place],[facility] and [facility].   Gram stain aer bottle: Gram positive cocci in clusters resembling Staph   Results called to and read back by: Trinity Forbes RN 11/25/2018  06:35 Gram stain aer bottle: Gram positive cocci in clusters resembling Staph   Results called to and read back by: Trinity Forbes RN 11/25/2018  06:35     BMP:   Recent Labs   Lab 11/25/18  0705   *   *   K 4.3      CO2 20*   BUN 30*   CREATININE 1.3   CALCIUM 8.2*   MG 1.9     CBC:   Recent Labs   Lab 11/24/18  0346 11/25/18  0248 11/25/18  0850   WBC 20.64* 15.74* 19.66*   HGB 8.4* 9.4* 9.1*   HCT 29.0* 31.2* 30.9*   * 90* 127*     CMP:   Recent Labs   Lab 11/23/18  1817 11/23/18  2309 11/24/18  0346 11/25/18  0705   *  --  129* 130*   K 3.3*  --  4.9 4.3     --  105 104   CO2 20*  --  14* 20*   *  --  81 132*   BUN 23  --  27* 30*   CREATININE 1.4  --  1.4 1.3   CALCIUM 8.2*  --  8.0* 8.2*   PROT 5.9* 5.1*  --  5.7*   ALBUMIN 2.6* 2.3*  --  2.3*   BILITOT 2.2* 1.8*  --  1.2*   ALKPHOS 65 61  --  68   * 151*  --  142*   * 91*  --  105*   ANIONGAP 9  --  10 6*   EGFRNONAA 51.3*  --  51.3* 56.1*     Microbiology Results (last 7 days)     Procedure Component Value Units Date/Time    Blood culture [[id number]]     Order Status:  No result Specimen:  Blood     Blood culture [[id number]]     Order Status:  No result Specimen:  Blood     Blood culture x two cultures. Draw prior to antibiotics. [[id number]] Collected:  11/23/18 1613    Order Status:  Completed Specimen:  Blood from Peripheral, Antecubital, Left Updated:  11/25/18 0835     Blood Culture, Routine Gram stain clement bottle: Gram positive cocci in clusters resembling Staph     Blood Culture, Routine Results called to and read back by: Sharelne  Dipesh GASTELUM 11/24/2018  09:36     Blood Culture, Routine --     STAPHYLOCOCCUS AUREUS  ID consult required at [facility] and [facility].  For susceptibility see order #[id number]      Narrative:       Aerobic and anaerobic    Blood culture x two cultures. Draw prior to antibiotics. [[id number]] Collected:  11/23/18 1613    Order Status:  Completed Specimen:  Blood from Peripheral, Hand, Left Updated:  11/25/18 0833     Blood Culture, Routine Gram stain clement bottle: Gram positive cocci in clusters resembling Staph     Blood Culture, Routine Results called to and read back by: Sharlene Landon RN 11/24/2018  09:35     Blood Culture, Routine Gram stain aer bottle: Gram positive cocci in clusters resembling Staph     Blood Culture, Routine --     STAPHYLOCOCCUS AUREUS  Susceptibility pending  ID consult required at [facility] and [facility]   locations.      Narrative:       Aerobic and anaerobic    Blood culture [[id number]] Collected:  11/24/18 1215    Order Status:  Completed Specimen:  Blood from Peripheral, Forearm, Right Updated:  11/25/18 0636     Blood Culture, Routine Gram stain aer bottle: Gram positive cocci in clusters resembling Staph      Blood Culture, Routine Results called to and read back by: Trinity Forbes RN 11/25/2018  06:35    Blood culture [[id number]] Collected:  11/24/18 1220    Order Status:  Completed Specimen:  Blood from Peripheral, Wrist, Right Updated:  11/25/18 0635     Blood Culture, Routine Gram stain aer bottle: Gram positive cocci in clusters resembling Staph      Blood Culture, Routine Results called to and read back by: Trinity Forbes RN 11/25/2018  06:35        Wound Culture:   Recent Labs   Lab 11/23/18  1448   LABAERO STAPHYLOCOCCUS AUREUS  Moderate  Susceptibility pending       All pertinent labs within the past 24 hours have been reviewed.  Recent Lab Results       11/25/18  0850   11/25/18  0705   11/25/18  0248   11/25/18  0140         Immature Granulocytes 0.5   0.5       Immature Grans (Abs) 0.10  Comment:  Mild elevation in immature granulocytes is non specific and   can be seen in a variety of conditions including stress response,   acute inflammation, trauma and pregnancy. Correlation with other   laboratory and clinical findings is essential.     0.08  Comment:  Mild elevation in immature granulocytes is non specific and   can be seen in a variety of conditions including stress response,   acute inflammation, trauma and pregnancy. Correlation with other   laboratory and clinical findings is essential.         Albumin   2.3         Alkaline Phosphatase   68         ALT   105         Anion Gap   6         aPTT     27.3  Comment:  aPTT therapeutic range = 39-69 seconds       AST   142         Baso # 0.05   0.04       Basophil% 0.3   0.3       Bilirubin, Direct   0.8         Total Bilirubin   1.2  Comment:  For infants and newborns, interpretation of results should be based  on gestational age, weight and in agreement with clinical  observations.  Premature Infant recommended reference ranges:  Up to 24 hours.............<8.0 mg/dL  Up to 48 hours............<12.0 mg/dL  3-5 days..................<15.0 mg/dL  6-29 days.................<15.0 mg/dL           BUN, Bld   30         Calcium   8.2         Chloride   104         CO2   20         Creatinine   1.3         Differential Method Automated   Automated       eGFR if    >60.0         eGFR if non    56.1  Comment:  Calculation used to obtain the estimated glomerular filtration  rate (eGFR) is the CKD-EPI equation.            Eos # 0.1   0.1       Eosinophil% 0.3   0.5       Glucose   132         Gran # (ANC) 18.0   14.7       Gran% 91.6   93.1       Hematocrit 30.9   31.2       Hemoglobin 9.1   9.4       Coumadin Monitoring INR 4.0  Comment:  Coumadin Therapy:  2.0 - 3.0 for INR for all indicators except mechanical heart valves  and antiphospholipid syndromes  which should use 2.5 - 3.5.     3.8  Comment:  Coumadin Therapy:  2.0 - 3.0 for INR for all indicators except mechanical heart valves  and antiphospholipid syndromes which should use 2.5 - 3.5.         Lactate, Ramses 1.4  Comment:  Falsely low lactic acid results can be found in samples   containing >=13.0 mg/dL total bilirubin and/or >=3.5 mg/dL   direct bilirubin.             Lymph # 0.5   0.5       Lymph% 2.7   2.9       Magnesium   1.9         MCH 23.8   24.5       MCHC 29.4   30.1       MCV 81   82       Mono # 0.9   0.4       Mono% 4.6   2.7       MPV 11.7   SEE COMMENT  Comment:  Result not available.       nRBC 0   1       Phosphorus   1.5         Platelets 127   90       Potassium   4.3         Total Protein   5.7         Protime 38.6   37.3       RBC 3.82   3.83       RDW 18.6   19.0       Sodium   130         Vancomycin-Trough       21.4     WBC 19.66   15.74             Significant Imaging: I have reviewed all pertinent imaging results/findings within the past 24 hours.

## 2018-11-25 NOTE — ASSESSMENT & PLAN NOTE
- EP consulted.  - Successfully ATP'd out of VT  - Continuing Amio gtt.   - Increased ATP burst intervals and lowered detection zone  - Activated tachytherapies as well see EP note.

## 2018-11-25 NOTE — NURSING
Notified Dr. Rios CXR for R IJ placement completed.  MD to review and approve for use if appropriate.

## 2018-11-25 NOTE — PROCEDURES
"Suman Hayden is a 68 y.o. male patient.    Temp: (!) 100.9 °F (38.3 °C) (11/25/18 1115)  Pulse: 83 (11/25/18 1200)  Resp: (!) 30 (11/25/18 1200)  BP: (!) 72/0 (11/25/18 1110)  SpO2: 100 % (11/25/18 1200)  Weight: 78.5 kg (173 lb 1 oz) (11/24/18 0911)  Height: 5' 9" (175.3 cm) (11/24/18 0911)    Central Line  Date/Time: 11/25/2018 1:02 PM  Performed by: Paige Mireles MD  Assisting provider: Angie Torres MD  Pre-operative Diagnosis: Gram negative bacteremia  Consent Done: Yes  Time out: Immediately prior to procedure a "time out" was called to verify the correct patient, procedure, equipment, support staff and site/side marked as required.  Indications: med administration and hemodynamic monitoring  Anesthesia: local infiltration    Anesthesia:  Local Anesthetic: lidocaine 1% without epinephrine  Preparation: skin prepped with ChloraPrep  Skin prep agent dried: skin prep agent completely dried prior to procedure  Sterile barriers: all five maximum sterile barriers used - cap, mask, sterile gown, sterile gloves, and large sterile sheet  Hand hygiene: hand hygiene performed prior to central venous catheter insertion  Location details: right internal jugular  Catheter type: triple lumen  Catheter size: 7 Fr  Catheter Length: 16cm    Ultrasound guidance: yes  Vessel Caliber: mediumNeedle advanced into vessel with real time Ultrasound guidance.  Sterile sheath used.  Manometry: No   Number of attempts: 1  Assessment: placement verified by x-ray  Complications: none  Specimens: No  Implants: No  Post-procedure: line sutured,  chlorhexidine patch,  sterile dressing applied and blood return through all ports  Complications: No          TXP LVAD INTERROGATIONS 11/25/2018 11/25/2018 11/25/2018 11/25/2018 11/25/2018 11/25/2018 11/25/2018   Type HeartMate3 HeartMate3 HeartMate3 HeartMate3 HeartMate3 HeartMate3 HeartMate3   Flow 4.2 4.2 4.4 4.1 4.5 4.5 4.3   Speed 5250 5250 5200 5200 5200 5200 5200   PI 3.3 3.1 2.8 " 3.8 2.2 2 1.8   Power ([place]) 3.6 3.6 3.6 3.6 3.5 3.5 3.5   LSL - - - - - - -   Pulsatility Intermittent pulse Intermittent pulse Intermittent pulse Intermittent pulse Intermittent pulse Intermittent pulse -       Paige Mireles  11/25/2018

## 2018-11-25 NOTE — ASSESSMENT & PLAN NOTE
-c/w home lisinopril, currently holding home aldactone  -goal MAP less than 90  - slightly hypotensive. Will discuss 250 cc bolus of IVF

## 2018-11-25 NOTE — ASSESSMENT & PLAN NOTE
-pt presently initially dry with sepsis, s/p IVF hydration, labs notable for elevated BNP, CXR and CT show left pleural effusion, pt saturating well on 1 Liter oxygen via NC   -c/w home lisinopril, holding home aldactone b/c of elevated serum Cr.

## 2018-11-25 NOTE — PT/OT/SLP PROGRESS
Occupational Therapy      Patient Name:  Suman Hayden   MRN:  [id number]    OT orders received and acknowledged. Patient not seen today secondary to Other (Comment). RN hold this date due to pt experiencing medical instability this date (VTach, interrogation of AICD) Will follow-up as appropriate .    Karla Flores, OT  11/25/2018

## 2018-11-25 NOTE — PLAN OF CARE
Problem: Patient Care Overview  Goal: Plan of Care Review  Outcome: Ongoing (interventions implemented as appropriate)    No acute events throughout the shift. See vital signs and assessments in flowsheets. See below for updates on today's progress.     Pulmonary: remain on nasal cannula at 2 LPM with oxygen saturation of 95-96%    Cardiovascular: Paced rhythm at 80's. At around 3am patient's heart rhythm in the monitor can not be read accurately (showing VT, AF and Asystole). Patient remains awake, alert and coherent. No complaint of chest pain nor SOB.    Neurological: Oriented to person, place, situation and sometime confused as to time.    Gastrointestinal: on regular diet    Genitourinary: Voids per urinal    Endocrine: no endocrine dysfunction noted    Integumentary/Other: Skin lesions in the scrotum (jorge)    Infusions: KVO    Patient progressing towards goals as tolerated, plan of care communicated and reviewed with Suman Hayden and family. All concerns addressed. Will continue to monitor.

## 2018-11-25 NOTE — PROCEDURES
Patient with need for central line placement, as he had significant VT episode today, admitted with septic shock from DLES infection, and now with need for central access. Patient and wife understanding, no significant development from initial H&P from 11/23/18, patient did go into VT today, only change, all end organ function has improved.     I have explained the risks, benefits, and alternatives of the procedure in detail.  The patient voices understanding and all questions have been answered.  The patient agrees to proceed as planned.    Fellow present: Paige Mireles MD    Proceeding with Central line RIJ placement.    Angie Torres MD  HTS Staff

## 2018-11-25 NOTE — SUBJECTIVE & OBJECTIVE
Interval History:  On IV abx. This morning, nursing staff noted that it was hard to obtain and manual blood pressure and that the patient's telemetry was hard to read due to artifact. Patient was asymptomatic and awake and interactive and was responsive appropriately. I interrogated the device. Patient was found to be in VT at a rhythm of 158 bpm. Amiodarone was started. Due to the limited number of IV's, we will place a line for more IV access. ID and Derm consults appreciated.    Continuous Infusions:   amiodarone in dextrose 5% 1 mg/min (11/25/18 1100)    amiodarone in dextrose 5%       Scheduled Meds:   dronabinol  5 mg Oral BID    ferrous sulfate  325 mg Oral TID    lisinopril  5 mg Oral Daily    mupirocin   Topical (Top) Daily    pantoprazole  40 mg Oral BID    vancomycin (VANCOCIN) IVPB  1,000 mg Intravenous Q12H    warfarin  1 mg Oral Daily     PRN Meds:acetaminophen    Review of patient's allergies indicates:   Allergen Reactions    Asa [aspirin] Other (See Comments)     Bleeding ulcer     Objective:     Vital Signs (Most Recent):  Temp: (!) 100.9 °F (38.3 °C) (11/25/18 1115)  Pulse: 80 (11/25/18 1115)  Resp: (!) 26 (11/25/18 1115)  BP: (!) 72/0 (11/25/18 1110)  SpO2: 96 % (11/25/18 1110) Vital Signs (24h Range):  Temp:  [99.3 °F (37.4 °C)-102.1 °F (38.9 °C)] 100.9 °F (38.3 °C)  Pulse:  [0-150] 80  Resp:  [10-41] 26  SpO2:  [90 %-98 %] 96 %  BP: ()/(0-90) 72/0     Patient Vitals for the past 72 hrs (Last 3 readings):   Weight   11/24/18 0911 78.5 kg (173 lb 1 oz)   11/24/18 0910 78.5 kg (173 lb 1 oz)   11/23/18 2100 78.8 kg (173 lb 11.6 oz)     Body mass index is 25.56 kg/m².      Intake/Output Summary (Last 24 hours) at 11/25/2018 1128  Last data filed at 11/25/2018 1100  Gross per 24 hour   Intake 1054.94 ml   Output 850 ml   Net 204.94 ml       Hemodynamic Parameters:       Telemetry:     Physical Exam   Constitutional: He is oriented to person, place, and time. No distress.   HENT:    Mouth/Throat: Oropharynx is clear and moist.   Eyes: Pupils are equal, round, and reactive to light.   Neck: Neck supple. JVD present.   Cardiovascular:   Normal VAD hum    Pulmonary/Chest: Effort normal.   Abdominal: Soft. There is no tenderness.   Musculoskeletal: He exhibits no edema.   Neurological: He is alert and oriented to person, place, and time. No cranial nerve deficit.   Skin: Skin is warm and dry.   Psychiatric: He has a normal mood and affect. His behavior is normal.       Significant Labs:  CBC:  Recent Labs   Lab 11/24/18 0346 11/25/18 0248 11/25/18  0850   WBC 20.64* 15.74* 19.66*   RBC 3.53* 3.83* 3.82*   HGB 8.4* 9.4* 9.1*   HCT 29.0* 31.2* 30.9*   * 90* 127*   MCV 82 82 81*   MCH 23.8* 24.5* 23.8*   MCHC 29.0* 30.1* 29.4*     BNP:  Recent Labs   Lab 11/23/18  1426 11/23/18  1612   BNP 1,873* 1,204*     CMP:  Recent Labs   Lab 11/23/18  1817 11/23/18  2309 11/24/18  0346 11/25/18  0705   *  --  81 132*   CALCIUM 8.2*  --  8.0* 8.2*   ALBUMIN 2.6* 2.3*  --  2.3*   PROT 5.9* 5.1*  --  5.7*   *  --  129* 130*   K 3.3*  --  4.9 4.3   CO2 20*  --  14* 20*     --  105 104   BUN 23  --  27* 30*   CREATININE 1.4  --  1.4 1.3   ALKPHOS 65 61  --  68   * 91*  --  105*   * 151*  --  142*   BILITOT 2.2* 1.8*  --  1.2*      Coagulation:   Recent Labs   Lab 11/24/18 0346 11/25/18  0248 11/25/18  0850   INR 2.4* 3.8* 4.0*   APTT 32.5* 27.3  --      LDH:  Recent Labs   Lab 11/23/18  1426 11/24/18  0346   * 257     Microbiology:  Microbiology Results (last 7 days)     Procedure Component Value Units Date/Time    Blood culture [[id number]]     Order Status:  No result Specimen:  Blood     Blood culture [[id number]]     Order Status:  No result Specimen:  Blood     Blood culture x two cultures. Draw prior to antibiotics. [[id number]] Collected:  11/23/18 1613    Order Status:  Completed Specimen:  Blood from Peripheral, Antecubital, Left Updated:  11/25/18 0835      Blood Culture, Routine Gram stain clement bottle: Gram positive cocci in clusters resembling Staph     Blood Culture, Routine Results called to and read back by: Sharlene Landon RN 11/24/2018  09:36     Blood Culture, Routine --     STAPHYLOCOCCUS AUREUS  ID consult required at [facility].  For susceptibility see order #[id number]      Narrative:       Aerobic and anaerobic    Blood culture x two cultures. Draw prior to antibiotics. [[id number]] Collected:  11/23/18 1613    Order Status:  Completed Specimen:  Blood from Peripheral, Hand, Left Updated:  11/25/18 0833     Blood Culture, Routine Gram stain clement bottle: Gram positive cocci in clusters resembling Staph     Blood Culture, Routine Results called to and read back by: Sharlene Landon RN 11/24/2018  09:35     Blood Culture, Routine Gram stain aer bottle: Gram positive cocci in clusters resembling Staph     Blood Culture, Routine --     STAPHYLOCOCCUS AUREUS  Susceptibility pending  ID consult required at [facility] and [facility]   locations.      Narrative:       Aerobic and anaerobic    Blood culture [[id number]] Collected:  11/24/18 1215    Order Status:  Completed Specimen:  Blood from Peripheral, Forearm, Right Updated:  11/25/18 0636     Blood Culture, Routine Gram stain aer bottle: Gram positive cocci in clusters resembling Staph      Blood Culture, Routine Results called to and read back by: Trinity Forbes RN 11/25/2018  06:35    Blood culture [[id number]] Collected:  11/24/18 1220    Order Status:  Completed Specimen:  Blood from Peripheral, Wrist, Right Updated:  11/25/18 0635     Blood Culture, Routine Gram stain aer bottle: Gram positive cocci in clusters resembling Staph      Blood Culture, Routine Results called to and read back by: Trinity Forbes RN 11/25/2018  06:35          I have reviewed all pertinent labs within the past 24 hours.    Estimated Creatinine Clearance: 54.4 mL/min (based on SCr of  1.3 mg/dL).    Diagnostic Results:  I have reviewed all pertinent imaging results/findings within the past 24 hours.

## 2018-11-26 NOTE — SUBJECTIVE & OBJECTIVE
Interval History:   Repeat blood cultures are positive  Repeat blood ucltures are pending today  culturse +MRSA  On vancomycin. redosed 750 mg q12hr.   No complaints at this time    Review of Systems   Constitutional: Negative for activity change, appetite change, chills, diaphoresis and fever.   HENT: Negative for congestion.    Respiratory: Negative for cough, chest tightness and shortness of breath.    Cardiovascular: Negative for chest pain.   Gastrointestinal: Negative for abdominal distention, abdominal pain, diarrhea, nausea and vomiting.   Genitourinary: Positive for genital sores. Negative for difficulty urinating.   Musculoskeletal: Negative for arthralgias.   Skin: Positive for wound.   Neurological: Negative for tremors, syncope and speech difficulty.   Psychiatric/Behavioral: Negative for agitation.     Objective:     Vital Signs (Most Recent):  Temp: 99.9 °F (37.7 °C) (11/26/18 0300)  Pulse: 80 (11/26/18 1230)  Resp: (!) 26 (11/26/18 1230)  BP: (!) 142/109 (11/26/18 1200)  SpO2: 98 % (11/26/18 0702) Vital Signs (24h Range):  Temp:  [99.3 °F (37.4 °C)-101.1 °F (38.4 °C)] 99.9 °F (37.7 °C)  Pulse:  [75-93] 80  Resp:  [10-41] 26  SpO2:  [96 %-100 %] 98 %  BP: ()/(0-109) 142/109     Weight: 78.5 kg (173 lb)  Body mass index is 25.55 kg/m².    Estimated Creatinine Clearance: 54.4 mL/min (based on SCr of 1.3 mg/dL).    Physical Exam   Constitutional: He is oriented to person, place, and time. No distress.   HENT:   Mouth/Throat: Oropharynx is clear and moist.   Eyes: Pupils are equal, round, and reactive to light.   Neck: Neck supple. JVD present.   Cardiovascular:   Normal VAD hum    Pulmonary/Chest: Effort normal.   Abdominal: Soft. He exhibits no distension and no mass. There is no tenderness. There is no guarding.   Genitourinary:   Genitourinary Comments: Three boils present on testicles.  2 of which were openly draining   Musculoskeletal: He exhibits no edema.   Neurological: He is alert and  oriented to person, place, and time. No cranial nerve deficit.   Skin: Skin is warm and dry.   Psychiatric: He has a normal mood and affect. His behavior is normal.       Significant Labs:   Blood Culture:   Recent Labs   Lab 10/13/18  2007 11/23/18  1613 11/24/18  1215 11/24/18  1220   LABBLOO No growth after 5 days. Gram stain clement bottle: Gram positive cocci in clusters resembling Staph  Results called to and read back by: Sharlene Landon RN 11/24/2018  09:36  METHICILLIN RESISTANT STAPHYLOCOCCUS AUREUS  ID consult required at [facility].  For susceptibility see order #[id number]    Gram stain clement bottle: Gram positive cocci in clusters resembling Staph  Results called to and read back by: Sharlene Landon RN 11/24/2018  09:35  Gram stain aer bottle: Gram positive cocci in clusters resembling Staph  METHICILLIN RESISTANT STAPHYLOCOCCUS AUREUS  ID consult required at [facility].   Gram stain aer bottle: Gram positive cocci in clusters resembling Staph   Results called to and read back by: Trinity Forbes RN 11/25/2018  06:35  STAPHYLOCOCCUS AUREUS  Susceptibility pending  ID consult required at [facility].   Gram stain aer bottle: Gram positive cocci in clusters resembling Staph   Results called to and read back by: Trinity Forbes RN 11/25/2018  06:35  STAPHYLOCOCCUS AUREUS  ID consult required at [facility].  For susceptibility see order #[id number]       CBC:   Recent Labs   Lab 11/25/18  0248 11/25/18  0850 11/26/18  0424   WBC 15.74* 19.66* 18.52*   HGB 9.4* 9.1* 8.2*   HCT 31.2* 30.9* 27.3*   PLT 90* 127* 130*     CMP:   Recent Labs   Lab 11/25/18  0705 11/26/18  0424   *  --    K 4.3  --      --    CO2 20*  --    *  --    BUN 30*  --    CREATININE 1.3  --    CALCIUM 8.2*  --    PROT 5.7* 5.2*   ALBUMIN 2.3* 2.0*    BILITOT 1.2* 0.9   ALKPHOS 68 89   * 105*   * 91*   ANIONGAP 6*  --    EGFRNONAA 56.1*  --      Urine Culture: No results for input(s): LABURIN in the last 4320 hours.  Urine Studies:   Recent Labs   Lab 09/25/18  1948  10/13/18  2118   COLORU [place]   < > Yellow   APPEARANCEUA Cloudy*   < > Clear   PHUR 5.0   < > 5.0   SPECGRAV 1.030   < > 1.010   PROTEINUA 1+*   < > Negative   GLUCUA Negative   < > Negative   KETONESU Negative   < > Negative   BILIRUBINUA Negative   < > Negative   OCCULTUA Negative   < > Negative   NITRITE Negative   < > Negative   UROBILINOGEN 2.0-3.0*   < > Negative   LEUKOCYTESUR Negative   < > Negative   RBCUA 9*  --   --    WBCUA 3  --   --    BACTERIA None  --   --    SQUAMEPITHEL 1  --   --    HYALINECASTS 6*  --   --     < > = values in this interval not displayed.     Wound Culture:   Recent Labs   Lab 11/23/18  1448   LABAERO METHICILLIN RESISTANT STAPHYLOCOCCUS AUREUS  Moderate       All pertinent labs within the past 24 hours have been reviewed.    Significant Imaging: I have reviewed all pertinent imaging results/findings within the past 24 hours.

## 2018-11-26 NOTE — PT/OT/SLP PROGRESS
Physical Therapy      Patient Name:  Suman Hayden   MRN:  [id number]    Patient not seen today secondary to Patient unwilling to participate.  today secondary to pt and spouse refusal to participate with therapy this date. Pt without specific reason to not participate this date.  Pt and spouse requesting therapy to return next date.         Leslee Whitehead, PT   11/26/2018

## 2018-11-26 NOTE — SUBJECTIVE & OBJECTIVE
Interval History: Feeling well, denies angina, lightheadedness, or syncope.      Review of Systems   HENT: Negative for congestion and ear discharge.    Eyes: Negative for blurred vision and discharge.   Cardiovascular: Negative for chest pain and claudication.   Respiratory: Negative for cough and hemoptysis.    Endocrine: Negative for cold intolerance and heat intolerance.     Objective:     Vital Signs (Most Recent):  Temp: 99.9 °F (37.7 °C) (11/26/18 0300)  Pulse: 80 (11/26/18 1000)  Resp: (!) 32 (11/26/18 1000)  BP: (!) 79/57 (11/26/18 0800)  SpO2: 98 % (11/26/18 0702) Vital Signs (24h Range):  Temp:  [99.3 °F (37.4 °C)-101.1 °F (38.4 °C)] 99.9 °F (37.7 °C)  Pulse:  [] 80  Resp:  [10-32] 32  SpO2:  [96 %-100 %] 98 %  BP: ()/(0-85) 79/57     Weight: 78.5 kg (173 lb)  Body mass index is 25.55 kg/m².     SpO2: 98 %  O2 Device (Oxygen Therapy): nasal cannula    Physical Exam   Constitutional: He is oriented to person, place, and time. He appears well-developed and well-nourished.   HENT:   Head: Normocephalic and atraumatic.   Nose: Nose normal.   Mouth/Throat: No oropharyngeal exudate.   Eyes: Right eye exhibits no discharge. Left eye exhibits no discharge. No scleral icterus.   Neck: Normal range of motion. Neck supple. No JVD present.   Cardiovascular: Normal rate, regular rhythm, S1 normal and S2 normal. Exam reveals no gallop, no S3, no S4, no distant heart sounds, no friction rub, no midsystolic click and no opening snap.   No murmur heard.  Pulses:       Radial pulses are 2+ on the right side, and 2+ on the left side.        Femoral pulses are 2+ on the right side, and 2+ on the left side.  LVAD hum   Pulmonary/Chest: Effort normal and breath sounds normal. No respiratory distress. He has no wheezes. He has no rales. He exhibits no tenderness.   Abdominal: Soft. Bowel sounds are normal. He exhibits no distension. There is no tenderness. There is no rebound.   Musculoskeletal: Normal range of  motion. He exhibits no edema, tenderness or deformity.   Lymphadenopathy:     He has no cervical adenopathy.   Neurological: He is alert and oriented to person, place, and time. No cranial nerve deficit.   Skin: Skin is warm and dry.   Psychiatric: He has a normal mood and affect. His behavior is normal.       Significant Labs:   BMP:   Recent Labs   Lab 11/25/18 0705 11/26/18 0424   *  --    *  --    K 4.3  --      --    CO2 20*  --    BUN 30*  --    CREATININE 1.3  --    CALCIUM 8.2*  --    MG 1.9 1.6   , CMP:   Recent Labs   Lab 11/25/18 0705 11/26/18 0424   *  --    K 4.3  --      --    CO2 20*  --    *  --    BUN 30*  --    CREATININE 1.3  --    CALCIUM 8.2*  --    PROT 5.7* 5.2*   ALBUMIN 2.3* 2.0*   BILITOT 1.2* 0.9   ALKPHOS 68 89   * 105*   * 91*   ANIONGAP 6*  --    ESTGFRAFRICA >60.0  --    EGFRNONAA 56.1*  --    , CBC:   Recent Labs   Lab 11/25/18 0248 11/25/18 0850 11/26/18 0424   WBC 15.74* 19.66* 18.52*   HGB 9.4* 9.1* 8.2*   HCT 31.2* 30.9* 27.3*   PLT 90* 127* 130*    and INR:   Recent Labs   Lab 11/25/18 0248 11/25/18  0850 11/26/18 0424   INR 3.8* 4.0* 3.7*       Significant Imaging: Telemetry showing AP-VS rhythm. No additional episodes of VT since last seen by our team yesterday.

## 2018-11-26 NOTE — PROGRESS NOTES
[facility]  Cardiac Electrophysiology  Progress Note    Admission Date: 11/23/2018  Code Status: Full Code   Attending Physician: Angie Torres MD   Expected Discharge Date: 12/4/2018  Principal Problem:Severe sepsis    Subjective:     Interval History: Feeling well, denies angina, lightheadedness, or syncope.      Review of Systems   HENT: Negative for congestion and ear discharge.    Eyes: Negative for blurred vision and discharge.   Cardiovascular: Negative for chest pain and claudication.   Respiratory: Negative for cough and hemoptysis.    Endocrine: Negative for cold intolerance and heat intolerance.     Objective:     Vital Signs (Most Recent):  Temp: 99.9 °F (37.7 °C) (11/26/18 0300)  Pulse: 80 (11/26/18 1000)  Resp: (!) 32 (11/26/18 1000)  BP: (!) 79/57 (11/26/18 0800)  SpO2: 98 % (11/26/18 0702) Vital Signs (24h Range):  Temp:  [99.3 °F (37.4 °C)-101.1 °F (38.4 °C)] 99.9 °F (37.7 °C)  Pulse:  [] 80  Resp:  [10-32] 32  SpO2:  [96 %-100 %] 98 %  BP: ()/(0-85) 79/57     Weight: 78.5 kg (173 lb)  Body mass index is 25.55 kg/m².     SpO2: 98 %  O2 Device (Oxygen Therapy): nasal cannula    Physical Exam   Constitutional: He is oriented to person, place, and time. He appears well-developed and well-nourished.   HENT:   Head: Normocephalic and atraumatic.   Nose: Nose normal.   Mouth/Throat: No oropharyngeal exudate.   Eyes: Right eye exhibits no discharge. Left eye exhibits no discharge. No scleral icterus.   Neck: Normal range of motion. Neck supple. No JVD present.   Cardiovascular: Normal rate, regular rhythm, S1 normal and S2 normal. Exam reveals no gallop, no S3, no S4, no distant heart sounds, no friction rub, no midsystolic click and no opening snap.   No murmur heard.  Pulses:       Radial pulses are 2+ on the right side, and 2+ on the left side.        Femoral pulses are 2+ on the right side, and 2+ on the left side.  LVAD hum   Pulmonary/Chest: Effort normal and breath  sounds normal. No respiratory distress. He has no wheezes. He has no rales. He exhibits no tenderness.   Abdominal: Soft. Bowel sounds are normal. He exhibits no distension. There is no tenderness. There is no rebound.   Musculoskeletal: Normal range of motion. He exhibits no edema, tenderness or deformity.   Lymphadenopathy:     He has no cervical adenopathy.   Neurological: He is alert and oriented to person, place, and time. No cranial nerve deficit.   Skin: Skin is warm and dry.   Psychiatric: He has a normal mood and affect. His behavior is normal.       Significant Labs:   BMP:   Recent Labs   Lab 11/25/18 0705 11/26/18 0424   *  --    *  --    K 4.3  --      --    CO2 20*  --    BUN 30*  --    CREATININE 1.3  --    CALCIUM 8.2*  --    MG 1.9 1.6   , CMP:   Recent Labs   Lab 11/25/18 0705 11/26/18 0424   *  --    K 4.3  --      --    CO2 20*  --    *  --    BUN 30*  --    CREATININE 1.3  --    CALCIUM 8.2*  --    PROT 5.7* 5.2*   ALBUMIN 2.3* 2.0*   BILITOT 1.2* 0.9   ALKPHOS 68 89   * 105*   * 91*   ANIONGAP 6*  --    ESTGFRAFRICA >60.0  --    EGFRNONAA 56.1*  --    , CBC:   Recent Labs   Lab 11/25/18 0248 11/25/18 0850 11/26/18 0424   WBC 15.74* 19.66* 18.52*   HGB 9.4* 9.1* 8.2*   HCT 31.2* 30.9* 27.3*   PLT 90* 127* 130*    and INR:   Recent Labs   Lab 11/25/18 0248 11/25/18 0850 11/26/18 0424   INR 3.8* 4.0* 3.7*       Significant Imaging: Telemetry showing AP-VS rhythm. No additional episodes of VT since last seen by our team yesterday.      Assessment and Plan:     Ventricular tachyarrhythmia    68M with NICM/LVAD HM3, hx of AFL with aberrancy, hx of VT on amiodarone presenting with worsening DLES. EP consulted for new onset WCT this AM; pt asymptomatic with HR at ~145 bpm. Manually pace-terminated with burst pacing (unsuccessful with 88% R-R, 84% R-R; successful 81% R-R).     Telemetry showing AP-VS rhythm. No additional episodes of VT since  last seen by our team yesterday.      -Continue amiodarone, may switch to  daily  -Device settings adjusted (11/25/2018): VT tx zone from 188-240 bpm to 143-240 bpm. Therapy now Burst (8, 81% R-R), 20J, 35J x4. Previously Burst(3), Burst(3), no shocks.    Patient seen with Dr Owen. We will sign off.         Gregorio Culp MD  Cardiac Electrophysiology  [facility]

## 2018-11-26 NOTE — PROGRESS NOTES
[facility]  Heart Transplant  Progress Note    Patient Name: Suman Hayden  MRN: [id number]  Admission Date: 11/23/2018  Hospital Length of Stay: 3 days  Attending Physician: Angie Torres MD  Primary Care Provider: Joe Ernst MD  Principal Problem:Severe sepsis    Subjective:     Interval History: no issues overnight, central line placed, continues on IV antibiotics for bacteremia  Now on IV amiodarone load for VT    Continuous Infusions:   amiodarone in dextrose 5% 0.5 mg/min (11/26/18 0725)     Scheduled Meds:   dronabinol  5 mg Oral BID    ferrous sulfate  325 mg Oral TID    lisinopril  5 mg Oral Daily    mupirocin   Topical (Top) Daily    pantoprazole  40 mg Oral BID    vancomycin (VANCOCIN) IVPB  1,000 mg Intravenous Q12H     PRN Meds:acetaminophen    Review of patient's allergies indicates:   Allergen Reactions    Asa [aspirin] Other (See Comments)     Bleeding ulcer     Objective:     Vital Signs (Most Recent):  Temp: 99.9 °F (37.7 °C) (11/26/18 0300)  Pulse: 80 (11/26/18 0930)  Resp: (!) 28 (11/26/18 0930)  BP: (!) 79/57 (11/26/18 0800)  SpO2: 98 % (11/26/18 0500) Vital Signs (24h Range):  Temp:  [99.3 °F (37.4 °C)-101.1 °F (38.4 °C)] 99.9 °F (37.7 °C)  Pulse:  [] 80  Resp:  [10-31] 28  SpO2:  [96 %-100 %] 98 %  BP: ()/(0-85) 79/57     Patient Vitals for the past 72 hrs (Last 3 readings):   Weight   11/26/18 0600 78.5 kg (173 lb)   11/24/18 0911 78.5 kg (173 lb 1 oz)   11/24/18 0910 78.5 kg (173 lb 1 oz)     Body mass index is 25.55 kg/m².      Intake/Output Summary (Last 24 hours) at 11/26/2018 0945  Last data filed at 11/26/2018 0600  Gross per 24 hour   Intake 1403.05 ml   Output 2525 ml   Net -1121.95 ml       Hemodynamic Parameters:  CVP:  [7 mmHg] 7 mmHg        Physical Exam   Constitutional: He is oriented to person, place, and time. No distress.   HENT:   Mouth/Throat: Oropharynx is clear and moist.   Eyes: Pupils are equal, round, and reactive to  light.   Neck: Neck supple. JVD present.   Cardiovascular:   Normal VAD hum    Pulmonary/Chest: Effort normal.   Abdominal: Soft.   Tenderness to palpation of the umbilicus area, no rebound/guarding, normoactive bowel sounds   Musculoskeletal: He exhibits no edema.   Neurological: He is alert and oriented to person, place, and time. No cranial nerve deficit.   Skin: Skin is warm and dry.   Psychiatric: He has a normal mood and affect. His behavior is normal.       Significant Labs:  CBC:  Recent Labs   Lab 11/25/18  0248 11/25/18  0850 11/26/18  0424   WBC 15.74* 19.66* 18.52*   RBC 3.83* 3.82* 3.52*   HGB 9.4* 9.1* 8.2*   HCT 31.2* 30.9* 27.3*   PLT 90* 127* 130*   MCV 82 81* 78*   MCH 24.5* 23.8* 23.3*   MCHC 30.1* 29.4* 30.0*     BNP:  Recent Labs   Lab 11/23/18  1426 11/23/18  1612 11/26/18  0424   BNP 1,873* 1,204* 1,512*     CMP:  Recent Labs   Lab 11/23/18  1817 11/23/18  2309 11/24/18  0346 11/25/18  0705 11/26/18  0424   *  --  81 132*  --    CALCIUM 8.2*  --  8.0* 8.2*  --    ALBUMIN 2.6* 2.3*  --  2.3* 2.0*   PROT 5.9* 5.1*  --  5.7* 5.2*   *  --  129* 130*  --    K 3.3*  --  4.9 4.3  --    CO2 20*  --  14* 20*  --      --  105 104  --    BUN 23  --  27* 30*  --    CREATININE 1.4  --  1.4 1.3  --    ALKPHOS 65 61  --  68 89   * 91*  --  105* 91*   * 151*  --  142* 105*   BILITOT 2.2* 1.8*  --  1.2* 0.9      Coagulation:   Recent Labs   Lab 11/24/18  0346 11/25/18  0248 11/25/18  0850 11/26/18  0424   INR 2.4* 3.8* 4.0* 3.7*   APTT 32.5* 27.3  --  40.8*     LDH:  Recent Labs   Lab 11/23/18  1426 11/24/18  0346   * 257     Microbiology:  Microbiology Results (last 7 days)     Procedure Component Value Units Date/Time    Blood culture [[id number]] Collected:  11/24/18 1220    Order Status:  Completed Specimen:  Blood from Peripheral, Wrist, Right Updated:  11/26/18 0851     Blood Culture, Routine Gram stain aer bottle: Gram positive cocci in clusters resembling Staph       Blood Culture, Routine Results called to and read back by: Trinity Forbes RN 11/25/2018  06:35     Blood Culture, Routine --     STAPHYLOCOCCUS AUREUS  ID consult required at [facility].  For susceptibility see order #[id number]      Blood culture [[id number]] Collected:  11/24/18 1215    Order Status:  Completed Specimen:  Blood from Peripheral, Forearm, Right Updated:  11/26/18 0849     Blood Culture, Routine Gram stain aer bottle: Gram positive cocci in clusters resembling Staph      Blood Culture, Routine Results called to and read back by: Trinity Forbes RN 11/25/2018  06:35     Blood Culture, Routine --     STAPHYLOCOCCUS AUREUS  Susceptibility pending  ID consult required at [facility].      Blood culture [[id number]]     Order Status:  No result Specimen:  Blood     Blood culture [[id number]]     Order Status:  No result Specimen:  Blood     Blood culture [[id number]]     Order Status:  Canceled Specimen:  Blood     Blood culture [[id number]]     Order Status:  Canceled Specimen:  Blood     Blood culture x two cultures. Draw prior to antibiotics. [[id number]] Collected:  11/23/18 1613    Order Status:  Completed Specimen:  Blood from Peripheral, Antecubital, Left Updated:  11/25/18 0835     Blood Culture, Routine Gram stain clement bottle: Gram positive cocci in clusters resembling Staph     Blood Culture, Routine Results called to and read back by: Sharlene Landon RN 11/24/2018  09:36     Blood Culture, Routine --     STAPHYLOCOCCUS AUREUS  ID consult required at [facility].  For susceptibility see order #[id number]      Narrative:       Aerobic and anaerobic    Blood culture x two cultures. Draw prior to antibiotics. [[id number]] Collected:  11/23/18 1613    Order Status:  Completed Specimen:  Blood from Peripheral, Hand, Left Updated:  11/25/18 0833     Blood Culture, Routine Gram stain  clement bottle: Gram positive cocci in clusters resembling Staph     Blood Culture, Routine Results called to and read back by: Sharlene Landon RN 11/24/2018  09:35     Blood Culture, Routine Gram stain aer bottle: Gram positive cocci in clusters resembling Staph     Blood Culture, Routine --     STAPHYLOCOCCUS AUREUS  Susceptibility pending  ID consult required at [facility],[facility] and [facility]   locations.      Narrative:       Aerobic and anaerobic          I have reviewed all pertinent labs within the past 24 hours.    Estimated Creatinine Clearance: 54.4 mL/min (based on SCr of 1.3 mg/dL).    Diagnostic Results:  I have reviewed and interpreted all pertinent imaging results/findings within the past 24 hours.    Assessment and Plan:     68 year old male with PMHx significant for HFrEF secondary to non ischemic cardiomyopathy underwent Heartmate  3 LVAD implanted as DT therapy 7/27/17 and VT on amiodarone s/p ICD (with revision in 11/2017 complicated by pocket hematoma). Presents to ED today from clinic for worsening DLES infection and fever. Patient seen by VAD nurse and ID staff who feel admission to [facility] with IV antibiotics warranted. Patient also with fevers, chills, decreased appetite, scrotal boils with spontaneous purulent discharge/rupture, urinary incontinence and confusion at home. History provided by patient and supplemented by his wife. He denies recent travel or being around sick contacts. He is complaint with taking his home medications. Of note, he was recently hospitalized for GIB, history of ileal ulcer s/p intervention. Being followed also by EP by Dr. Winchester for possible future VT ablation. Pt denies recent ICD shocks as well as low flow alarms from his VAD.  In the ER, he was found febrile with Tmax of 103 deg F, doppler BP 60-70's and HR 70s. He was given 2 Liters NS IVFs, cefepime/vancomycin and resumed on home meds except aldactone. Blood cultures sent as well as gram stain and  culture of specimen from drive line. CXR showed left small pleural effusion and CT abdomen performed that showed small volume fluid inferior aspect of LVAD along with small pericardial effusion. Labs notable for leukocytosis, mild INDIRA, elevated AST/ALT/T.bili, BNP and lactate 2.7 as well as CRP of 215. LDH was 294. VAD was interrogated and noted to have PI events. Pt was admitted to the ICU for further management/care.     * Severe sepsis    -pt presented with fever, found to have leukocytosis, elevated lactate/CRP and mild INDIRA  -sources potentially: drive line infection (purulent discharge- sampled already and sent to lab; GS showed rare WBCs, CT abdomen showed small volume of fluid inferior aspect of VAD) vs skin infection (scrotal area with boils) vs UTI (pt with frequency, concentrated/dark urine)   -c/w broad spectrum antibiotics (Vancomycin/Cefepime)   -f/u BCx, lactate is wnl, procalc is elevated.  -ID consulted for rec's on antibiotic selection/management. On IV vanc, culture and sensitivities pending.  -Derm consulted for management of scrotal skin issue. Continue Mucinprocin   -Tylenol PRN (low dose, given elevated LFT's), Ice packs PRN      Transaminitis    -likely due to sepsis vs effects of amiodarone  -will trend LFT's   -CT abdomen: liver is normal in size and attenuation without focal hepatic lesion.  There is gallbladder sludge.  The spleen, adrenal glands, and pancreas are unremarkable.   -holding home pravastatin     Infection, skin    -worsening DLES infection with fever 102  - Send blood culutres  - ID consult, will start broad spectrum abx with vanc and cefepime (given one dose of each in ED already)  -will get imaging with CT to look for further infection, abscess      INDIRA (acute kidney injury)    -mild elevation in serum Cr (1.4, from baseline 1.2)  -holding home aldactone, s/p IVF's (2L NS)   -encourage PO fluid intake, no more IVF's   -bladder scan q6hrs, PRN ISC for PVR >300 cc       Iron  deficiency anemia due to chronic blood loss    -c/w home oral iron supplementation tablets     LVAD (left ventricular assist device) present    -HM3 5200 rpm, DT therapy  -Not on home diuretics  -INR supratherapeutic currently, Continue coumadin for goal INR 2-3, off aspirin due to multiple GI bleeds (no heparin gtt) No bridge. Holding today.  -LDH stable    Procedure: Device Interrogation Including analysis of device parameters  Current Settings: Ventricular Assist Device  Review of device function is stable/unstable stable    TXP LVAD INTERROGATIONS 11/26/2018 11/26/2018 11/26/2018 11/26/2018 11/26/2018 11/26/2018 11/26/2018   Type HeartMate3 HeartMate3 HeartMate3 HeartMate3 HeartMate3 HeartMate3 HeartMate3   Flow 4.5 4.5 4.5 4.5 4.4 4.5 4.5   Speed 5200 5200 5250 5200 5250 5200 5250   PI 3.3 3.2 2.9 2.1 2.9 2.5 2.4   Power ([place]) 3.5 3.6 3.6 3.5 3.7 3.6 3.6   LSL - - - - - - -   Pulsatility Intermittent pulse Intermittent pulse Intermittent pulse Intermittent pulse Intermittent pulse Intermittent pulse Intermittent pulse        Chronic combined systolic and diastolic congestive heart failure    -pt presently initially dry with sepsis, s/p IVF hydration, labs notable for elevated BNP, CXR and CT show left pleural effusion, pt saturating well on 1 Liter oxygen via NC   -c/w home lisinopril, holding home aldactone b/c of elevated serum Cr.     Ventricular tachyarrhythmia    - EP consulted.  - Successfully ATP'd out of VT  - Continuing Amio gtt.   - Increased ATP burst intervals and lowered detection zone  - Activated tachytherapies as well see EP note.      Essential hypertension    -c/w home lisinopril, currently holding home aldactone  -goal MAP less than 90         Uninterrupted Critical Care/Counseling Time (not including procedures): 30 minutes    MELISSA Long  Heart Transplant  [facility]

## 2018-11-26 NOTE — NURSING
Informed Dr. Rios that fatal rhythmssuch as VT, Afib, Vfib and Marcio are still showing in the cardiac monitor using all leads including LEAD II. Only V2 lead shows paced rhythm. I clarified if the Amiodarone IV infusion will be continued.    MD confirmed that Amiodarone Infusion needs to be continued until the morning rounds.

## 2018-11-26 NOTE — PROGRESS NOTES
[facility]  Infectious Disease  Progress Note    Patient Name: Suman Hayden  MRN: [id number]  Admission Date: 11/23/2018  Length of Stay: 3 days  Attending Physician: Angie Torres MD  Primary Care Provider: Joe Ernst MD    Isolation Status: Contact  Assessment/Plan:      Bacteremia    68 year old male with PMHx NICM s/p LVAD as DT 7/27/17, hx of staph epi bacteremia treated with 6 weeks of IV vancomycin 1/2018 admitted from LVAD clinic for drive line infection. 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    - CT abdomen performed that showed small volume fluid inferior aspect of LVAD. CRP of 215. Elevated WBC   - LVAD DLES +MRSA  - 2D echo negative for vegetations       Plan:  -continue vancomycin. Vancomycin redosed 750 vyq20uc. Level 21.   -repeat blood cultures today.   -2D echo negative, as repeat culture are positive recommend GLENN to r/o endocarditis   -Pt will likely need long term IV abx  -ID will continue to follow.         Thank you for your consult. I will follow-up with patient. Please contact us if you have any additional questions.    Malika Chavarria PA-C  Infectious Disease  [facility]  [phone]    Subjective:     Principal Problem:Severe sepsis    HPI: 68 year old male with PMHx significant for HFrEF secondary to non ischemic cardiomyopathy underwent Heartmate 3 LVAD implanted as DT therapy 7/27/17, (NYHA class II) and VT on amiodarone s/p ICD (with revision in 11/2017 complicated by pocket hematoma, and prior hospitalization for ICD shocks secondary to atrial tachycardia with abberancy). Presents to ED today from clinic for worsening DLES infection (purulent discharge and blood tinged) and fever. Patient seen by VAD nurse and ID staff who felt admission to [facility] with IV antibiotics warranted. Patient also with fevers, chills, decreased appetite, scrotal boils with spontaneous purulent discharge/rupture, urinary incontinence and confusion at home for the past  three days. In the ER, he was found febrile with Tmax of 103 deg F, doppler BP 60-70's and HR 70s. He was given 2 Liters NS IVFs, cefepime/vancomycin and resumed on home meds except aldactone. Blood cultures sent as well as gram stain and culture of specimen from drive line. CXR showed left small pleural effusion and CT abdomen performed that showed small volume fluid inferior aspect of LVAD along with small pericardial effusion. Labs notable for leukocytosis, mild INDIRA, elevated AST/ALT/T.bili, BNP and lactate 2.7 as well as CRP of 215. LDH was 294.  LVAD DLES- showing Staph Aureus.  Blood cxs showing- gram positive coccis.  Pt and wife today reports much improvement since the start of abx.        Interval History:   Repeat blood cultures are positive  Repeat blood ucltures are pending today  culturse +MRSA  On vancomycin. redosed 750 mg q12hr.   No complaints at this time    Review of Systems   Constitutional: Negative for activity change, appetite change, chills, diaphoresis and fever.   HENT: Negative for congestion.    Respiratory: Negative for cough, chest tightness and shortness of breath.    Cardiovascular: Negative for chest pain.   Gastrointestinal: Negative for abdominal distention, abdominal pain, diarrhea, nausea and vomiting.   Genitourinary: Positive for genital sores. Negative for difficulty urinating.   Musculoskeletal: Negative for arthralgias.   Skin: Positive for wound.   Neurological: Negative for tremors, syncope and speech difficulty.   Psychiatric/Behavioral: Negative for agitation.     Objective:     Vital Signs (Most Recent):  Temp: 99.9 °F (37.7 °C) (11/26/18 0300)  Pulse: 80 (11/26/18 1230)  Resp: (!) 26 (11/26/18 1230)  BP: (!) 142/109 (11/26/18 1200)  SpO2: 98 % (11/26/18 0702) Vital Signs (24h Range):  Temp:  [99.3 °F (37.4 °C)-101.1 °F (38.4 °C)] 99.9 °F (37.7 °C)  Pulse:  [75-93] 80  Resp:  [10-41] 26  SpO2:  [96 %-100 %] 98 %  BP: ()/(0-109) 142/109     Weight: 78.5 kg (173  lb)  Body mass index is 25.55 kg/m².    Estimated Creatinine Clearance: 54.4 mL/min (based on SCr of 1.3 mg/dL).    Physical Exam   Constitutional: He is oriented to person, place, and time. No distress.   HENT:   Mouth/Throat: Oropharynx is clear and moist.   Eyes: Pupils are equal, round, and reactive to light.   Neck: Neck supple. JVD present.   Cardiovascular:   Normal VAD hum    Pulmonary/Chest: Effort normal.   Abdominal: Soft. He exhibits no distension and no mass. There is no tenderness. There is no guarding.   Genitourinary:   Genitourinary Comments: Three boils present on testicles.  2 of which were openly draining   Musculoskeletal: He exhibits no edema.   Neurological: He is alert and oriented to person, place, and time. No cranial nerve deficit.   Skin: Skin is warm and dry.   Psychiatric: He has a normal mood and affect. His behavior is normal.       Significant Labs:   Blood Culture:   Recent Labs   Lab 10/13/18  2007 11/23/18  1613 11/24/18  1215 11/24/18  1220   LABBLOO No growth after 5 days. Gram stain clement bottle: Gram positive cocci in clusters resembling Staph  Results called to and read back by: Sharlene Landon RN 11/24/2018  09:36  METHICILLIN RESISTANT STAPHYLOCOCCUS AUREUS  ID consult required at [facility] and [facility].  For susceptibility see order #[id number]    Gram stain clement bottle: Gram positive cocci in clusters resembling Staph  Results called to and read back by: Sharlene Landon RN 11/24/2018  09:35  Gram stain aer bottle: Gram positive cocci in clusters resembling Staph  METHICILLIN RESISTANT STAPHYLOCOCCUS AUREUS  ID consult required at [facility] and [facility].   Gram stain aer bottle: Gram positive cocci in clusters resembling Staph   Results called to and read back by: Trinity Forbes RN 11/25/2018  06:35  STAPHYLOCOCCUS AUREUS  Susceptibility pending  ID consult required at [facility] and  [facility]   locations.   Gram stain aer bottle: Gram positive cocci in clusters resembling Staph   Results called to and read back by: Trinity Forbes RN 11/25/2018  06:35  STAPHYLOCOCCUS AUREUS  ID consult required at [facility] and [facility].  For susceptibility see order #[id number]       CBC:   Recent Labs   Lab 11/25/18  0248 11/25/18  0850 11/26/18  0424   WBC 15.74* 19.66* 18.52*   HGB 9.4* 9.1* 8.2*   HCT 31.2* 30.9* 27.3*   PLT 90* 127* 130*     CMP:   Recent Labs   Lab 11/25/18  0705 11/26/18  0424   *  --    K 4.3  --      --    CO2 20*  --    *  --    BUN 30*  --    CREATININE 1.3  --    CALCIUM 8.2*  --    PROT 5.7* 5.2*   ALBUMIN 2.3* 2.0*   BILITOT 1.2* 0.9   ALKPHOS 68 89   * 105*   * 91*   ANIONGAP 6*  --    EGFRNONAA 56.1*  --      Urine Culture: No results for input(s): LABURIN in the last 4320 hours.  Urine Studies:   Recent Labs   Lab 09/25/18  1948  10/13/18  2118   COLORU [place]   < > Yellow   APPEARANCEUA Cloudy*   < > Clear   PHUR 5.0   < > 5.0   SPECGRAV 1.030   < > 1.010   PROTEINUA 1+*   < > Negative   GLUCUA Negative   < > Negative   KETONESU Negative   < > Negative   BILIRUBINUA Negative   < > Negative   OCCULTUA Negative   < > Negative   NITRITE Negative   < > Negative   UROBILINOGEN 2.0-3.0*   < > Negative   LEUKOCYTESUR Negative   < > Negative   RBCUA 9*  --   --    WBCUA 3  --   --    BACTERIA None  --   --    SQUAMEPITHEL 1  --   --    HYALINECASTS 6*  --   --     < > = values in this interval not displayed.     Wound Culture:   Recent Labs   Lab 11/23/18  1448   LABAERO METHICILLIN RESISTANT STAPHYLOCOCCUS AUREUS  Moderate       All pertinent labs within the past 24 hours have been reviewed.    Significant Imaging: I have reviewed all pertinent imaging results/findings within the past 24 hours.

## 2018-11-26 NOTE — PT/OT/SLP PROGRESS
Occupational Therapy      Patient Name:  Suman Hayden   MRN:  [id number]    Patient not seen today secondary to pt and spouse refusal to participate with therapy this date. Pt without specific reason to not participate this date.  Pt and spouse requesting therapy to return next date.   DELMY Navarro  11/26/2018

## 2018-11-26 NOTE — PLAN OF CARE
Problem: Fall Risk (Adult)  Goal: Identify Related Risk Factors and Signs and Symptoms  Related risk factors and signs and symptoms are identified upon initiation of Human Response Clinical Practice Guideline (CPG)  Outcome: Ongoing (interventions implemented as appropriate)  Reviewed fall risk on patient with wife. Multiple lines attached to patient, bedside commode.

## 2018-11-26 NOTE — PLAN OF CARE
Problem: Patient Care Overview  Goal: Plan of Care Review  Outcome: Ongoing (interventions implemented as appropriate)    No acute events throughout the shift. See vital signs and assessments in flowsheets. See below for updates on today's progress.     Pulmonary: Remains on nasal cannula 1LPM with oxygen saturation of %    Cardiovascular: PACED rhythm at rate 80bpm. LVAD in place (see critical flowsheet). Doppler pressure is 65-75mmhg; CVP 3-4    Neurological: Oriented to person, place, situation and sometime as to time.    Gastrointestinal: On regular diet    Genitourinary: Voids per urinal    Endocrine: No endocrine dysfunction noted     Integumentary/Other: skin lesion  (boils) in scrotum    Infusions: Amiodarone    Patient progressing towards goals as tolerated, plan of care communicated and reviewed with Suman Hayden and family. All concerns addressed. Will continue to monitor.

## 2018-11-26 NOTE — SUBJECTIVE & OBJECTIVE
Interval History: no issues overnight, central line placed, continues on IV antibiotics for bacteremia  Now on IV amiodarone load for VT    Continuous Infusions:   amiodarone in dextrose 5% 0.5 mg/min (11/26/18 0725)     Scheduled Meds:   dronabinol  5 mg Oral BID    ferrous sulfate  325 mg Oral TID    lisinopril  5 mg Oral Daily    mupirocin   Topical (Top) Daily    pantoprazole  40 mg Oral BID    vancomycin (VANCOCIN) IVPB  1,000 mg Intravenous Q12H     PRN Meds:acetaminophen    Review of patient's allergies indicates:   Allergen Reactions    Asa [aspirin] Other (See Comments)     Bleeding ulcer     Objective:     Vital Signs (Most Recent):  Temp: 99.9 °F (37.7 °C) (11/26/18 0300)  Pulse: 80 (11/26/18 0930)  Resp: (!) 28 (11/26/18 0930)  BP: (!) 79/57 (11/26/18 0800)  SpO2: 98 % (11/26/18 0500) Vital Signs (24h Range):  Temp:  [99.3 °F (37.4 °C)-101.1 °F (38.4 °C)] 99.9 °F (37.7 °C)  Pulse:  [] 80  Resp:  [10-31] 28  SpO2:  [96 %-100 %] 98 %  BP: ()/(0-85) 79/57     Patient Vitals for the past 72 hrs (Last 3 readings):   Weight   11/26/18 0600 78.5 kg (173 lb)   11/24/18 0911 78.5 kg (173 lb 1 oz)   11/24/18 0910 78.5 kg (173 lb 1 oz)     Body mass index is 25.55 kg/m².      Intake/Output Summary (Last 24 hours) at 11/26/2018 0945  Last data filed at 11/26/2018 0600  Gross per 24 hour   Intake 1403.05 ml   Output 2525 ml   Net -1121.95 ml       Hemodynamic Parameters:  CVP:  [7 mmHg] 7 mmHg        Physical Exam   Constitutional: He is oriented to person, place, and time. No distress.   HENT:   Mouth/Throat: Oropharynx is clear and moist.   Eyes: Pupils are equal, round, and reactive to light.   Neck: Neck supple. JVD present.   Cardiovascular:   Normal VAD hum    Pulmonary/Chest: Effort normal.   Abdominal: Soft.   Tenderness to palpation of the umbilicus area, no rebound/guarding, normoactive bowel sounds   Musculoskeletal: He exhibits no edema.   Neurological: He is alert and oriented to  person, place, and time. No cranial nerve deficit.   Skin: Skin is warm and dry.   Psychiatric: He has a normal mood and affect. His behavior is normal.       Significant Labs:  CBC:  Recent Labs   Lab 11/25/18  0248 11/25/18  0850 11/26/18  0424   WBC 15.74* 19.66* 18.52*   RBC 3.83* 3.82* 3.52*   HGB 9.4* 9.1* 8.2*   HCT 31.2* 30.9* 27.3*   PLT 90* 127* 130*   MCV 82 81* 78*   MCH 24.5* 23.8* 23.3*   MCHC 30.1* 29.4* 30.0*     BNP:  Recent Labs   Lab 11/23/18  1426 11/23/18  1612 11/26/18  0424   BNP 1,873* 1,204* 1,512*     CMP:  Recent Labs   Lab 11/23/18  1817 11/23/18  2309 11/24/18 0346 11/25/18  0705 11/26/18  0424   *  --  81 132*  --    CALCIUM 8.2*  --  8.0* 8.2*  --    ALBUMIN 2.6* 2.3*  --  2.3* 2.0*   PROT 5.9* 5.1*  --  5.7* 5.2*   *  --  129* 130*  --    K 3.3*  --  4.9 4.3  --    CO2 20*  --  14* 20*  --      --  105 104  --    BUN 23  --  27* 30*  --    CREATININE 1.4  --  1.4 1.3  --    ALKPHOS 65 61  --  68 89   * 91*  --  105* 91*   * 151*  --  142* 105*   BILITOT 2.2* 1.8*  --  1.2* 0.9      Coagulation:   Recent Labs   Lab 11/24/18 0346 11/25/18  0248 11/25/18  0850 11/26/18  0424   INR 2.4* 3.8* 4.0* 3.7*   APTT 32.5* 27.3  --  40.8*     LDH:  Recent Labs   Lab 11/23/18  1426 11/24/18  0346   * 257     Microbiology:  Microbiology Results (last 7 days)     Procedure Component Value Units Date/Time    Blood culture [[id number]] Collected:  11/24/18 1220    Order Status:  Completed Specimen:  Blood from Peripheral, Wrist, Right Updated:  11/26/18 0851     Blood Culture, Routine Gram stain aer bottle: Gram positive cocci in clusters resembling Staph      Blood Culture, Routine Results called to and read back by: Trinity Forbes RN 11/25/2018  06:35     Blood Culture, Routine --     STAPHYLOCOCCUS AUREUS  ID consult required at [facility] and [facility].  For susceptibility see order #[id number]      Blood culture [[id number]]  Collected:  11/24/18 1215    Order Status:  Completed Specimen:  Blood from Peripheral, Forearm, Right Updated:  11/26/18 0849     Blood Culture, Routine Gram stain aer bottle: Gram positive cocci in clusters resembling Staph      Blood Culture, Routine Results called to and read back by: Trinity Forbes RN 11/25/2018  06:35     Blood Culture, Routine --     STAPHYLOCOCCUS AUREUS  Susceptibility pending  ID consult required at [facility] and [facility].      Blood culture [[id number]]     Order Status:  No result Specimen:  Blood     Blood culture [[id number]]     Order Status:  No result Specimen:  Blood     Blood culture [[id number]]     Order Status:  Canceled Specimen:  Blood     Blood culture [[id number]]     Order Status:  Canceled Specimen:  Blood     Blood culture x two cultures. Draw prior to antibiotics. [[id number]] Collected:  11/23/18 1613    Order Status:  Completed Specimen:  Blood from Peripheral, Antecubital, Left Updated:  11/25/18 0835     Blood Culture, Routine Gram stain clement bottle: Gram positive cocci in clusters resembling Staph     Blood Culture, Routine Results called to and read back by: Sharlene Landon RN 11/24/2018  09:36     Blood Culture, Routine --     STAPHYLOCOCCUS AUREUS  ID consult required at [facility] and [facility].  For susceptibility see order #[id number]      Narrative:       Aerobic and anaerobic    Blood culture x two cultures. Draw prior to antibiotics. [[id number]] Collected:  11/23/18 1613    Order Status:  Completed Specimen:  Blood from Peripheral, Hand, Left Updated:  11/25/18 0833     Blood Culture, Routine Gram stain clement bottle: Gram positive cocci in clusters resembling Staph     Blood Culture, Routine Results called to and read back by: Sharlene Landon RN 11/24/2018  09:35     Blood Culture, Routine Gram stain aer bottle: Gram positive cocci in clusters resembling Staph     Blood Culture, Routine --     STAPHYLOCOCCUS  AUREUS  Susceptibility pending  ID consult required at [facility]   locations.      Narrative:       Aerobic and anaerobic          I have reviewed all pertinent labs within the past 24 hours.    Estimated Creatinine Clearance: 54.4 mL/min (based on SCr of 1.3 mg/dL).    Diagnostic Results:  I have reviewed and interpreted all pertinent imaging results/findings within the past 24 hours.

## 2018-11-26 NOTE — ASSESSMENT & PLAN NOTE
68 year old male with PMHx NICM s/p LVAD as DT 7/27/17, hx of staph epi bacteremia treated with 6 weeks of IV vancomycin 1/2018 admitted from LVAD clinic for drive line infection. 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    - CT abdomen performed that showed small volume fluid inferior aspect of LVAD. CRP of 215. Elevated WBC   - LVAD DLES +MRSA  - 2D echo negative for vegetations       Plan:  -continue vancomycin. Vancomycin redosed 750 [id number]. Level 21.   -repeat blood cultures today.   -2D echo negative, as repeat culture are positive recommend GLENN to r/o endocarditis   -Pt will likely need long term IV abx  -ID will continue to follow.

## 2018-11-26 NOTE — PROGRESS NOTES
11/26/2018  Baljinder Jones    Current provider:  Angie Torres MD      I, Baljinder Jones, rounded on Suman Hayden to ensure all mechanical assist device settings (IABP or VAD) were appropriate and all parameters were within limits.  I was able to ensure all back up equipment was present, the staff had no issues, and the Perfusion Department daily rounding was complete.    10:37 AM

## 2018-11-26 NOTE — ASSESSMENT & PLAN NOTE
-HM3 5200 rpm, DT therapy  -Not on home diuretics  -INR supratherapeutic currently, Continue coumadin for goal INR 2-3, off aspirin due to multiple GI bleeds (no heparin gtt) No bridge. Holding today.  -LDH stable    Procedure: Device Interrogation Including analysis of device parameters  Current Settings: Ventricular Assist Device  Review of device function is stable/unstable stable    TXP LVAD INTERROGATIONS 11/26/2018 11/26/2018 11/26/2018 11/26/2018 11/26/2018 11/26/2018 11/26/2018   Type HeartMate3 HeartMate3 HeartMate3 HeartMate3 HeartMate3 HeartMate3 HeartMate3   Flow 4.5 4.5 4.5 4.5 4.4 4.5 4.5   Speed 5200 5200 5250 5200 5250 5200 5250   PI 3.3 3.2 2.9 2.1 2.9 2.5 2.4   Power ([place]) 3.5 3.6 3.6 3.5 3.7 3.6 3.6   LSL - - - - - - -   Pulsatility Intermittent pulse Intermittent pulse Intermittent pulse Intermittent pulse Intermittent pulse Intermittent pulse Intermittent pulse

## 2018-11-27 NOTE — PLAN OF CARE
Problem: Occupational Therapy Goal  Goal: Occupational Therapy Goal  Outcome: Ongoing (interventions implemented as appropriate)  OT eval completed; no goals established as pt is performing ADL at baseline level. DELMY Lucero  11/27/2018

## 2018-11-27 NOTE — ASSESSMENT & PLAN NOTE
68 year old male with PMHx NICM s/p LVAD as DT 7/27/17, hx of staph epi bacteremia treated with 6 weeks of IV vancomycin 1/2018 admitted from LVAD clinic for drive line infection. 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    - CT abdomen performed that showed small volume fluid inferior aspect of LVAD. CRP of 215. Elevated WBC   - LVAD DLES +MRSA  - 2D echo negative for vegetations       Plan:  -continue vancomycin. Vancomycin redosed 750 [id number]. vanc trough due tomorrow   -repeat blood cultures today so far negative.   -2D echo negative, as repeat culture are positive recommend GLENN to r/o endocarditis as with presence of ICD  -Pt will likely need long term IV abx  -ID will continue to follow.

## 2018-11-27 NOTE — PT/OT/SLP EVAL
"Physical Therapy Evaluation    Patient Name:  Suman Hayden   MRN:  [id number]    Recommendations:     Discharge Recommendations:  home with home health   Discharge Equipment Recommendations: TBD  Barriers to discharge: None    Assessment:     Suman Hayden is a 68 y.o. male admitted with a medical diagnosis of Severe sepsis. Pt exhibits balance deficits and deviations in his gait pattern preventing him from returning to his normal home routines and daily activities, such as cooking. Prior to admission pt ambulated independently and only required assistance with dressing changes for his LVAD. He presents with the following impairments/functional limitations:  weakness, gait instability, impaired balance, impaired functional mobilty, decreased coordination.    Rehab Prognosis:  good; patient would benefit from acute skilled PT services to address these deficits and reach maximum level of function.      Recent Surgery: * No surgery found *      Plan:     During this hospitalization, patient to be seen 4 x/week to address the above listed problems via gait training, therapeutic activities, neuromuscular re-education, therapeutic exercises  · Plan of Care Expires:   12/27/18   Plan of Care Reviewed with: patient, spouse    Subjective     Communicated with nurse prior to session.  Patient found sitting up in chair with spouse present upon PT entry to room, agreeable to evaluation.      Chief Complaint: No complaints noted  Patient comments/goals: Pt "feels good" and wants to get up and walk  Pain/Comfort:  · Pain Rating 1: 0/10    Living Environment:  Pt lives in a 1 story home with his wife who is able to help provide care. Home requires one step to enter back. Pt prefers to shower while sitting in a chair, but has access to a shower. Pt requires assistance to change LVAD dressings from his spouse and other family members.  Prior to admission, patients level of function was independent with ambulation, requiring " no AD.  Patient has the following equipment: none.  DME owned (not currently used): none.  Upon discharge, patient will have assistance from spouse.    Objective:     Patient found with: LVAD, blood pressure cuff, telemetry, peripheral IV, central line     General Precautions: Standard, LVAD, contact   Orthopedic Precautions:  N/A  Braces:   N/A    Exams:  · Cognitive Exam:  Patient is oriented to Person, Place, Time, Situation. Able to recall year but not month  · Fine Motor Coordination:    · -       Intact with slow movements; Left hand, finger to nose, Right hand, finger to nose, Left hand thumb/finger opposition skills, Right hand thumb/finger opposition skills, Left hand, diadochokinesis skill  and Right hand, diadochokinesis skill  · Sensation:    · -       Intact  light/touch BLE  · RLE Strength: WFL except unable to hold knee extension against max resistance  · LLE Strength: WFL except unable to hold knee extension against max resistance      Functional Mobility:  · Transfers:     · Sit to Stand:  minimum assistance with no AD from chair. Required hand held assist from therapist for balance.  · Gait: ambulated 20 ft with mod A. Pt exhibited a narrow ARVIN with short, shuffling steps and rounded posture. Pt requested to ambulate further, however, ceased due to safety concerns.  · Bed mobility not performed d/t pt found in chair.    AM-PAC 6 CLICK MOBILITY  Total Score:16       Patient left up in chair with all lines intact, call button in reach and spouse present.    GOALS:   Multidisciplinary Problems     Physical Therapy Goals        Problem: Physical Therapy Goal    Goal Priority Disciplines Outcome Goal Variances Interventions   Physical Therapy Goal     PT, PT/OT Ongoing (interventions implemented as appropriate)     Description:  Goals to be met by: 18     Patient will increase functional independence with mobility by performin. Supine to sit with [place]  2. Sit to supine with  [place]  3. Sit to stand transfer with Stand-by Assistance  4. Bed to chair transfer with Stand-by Assistance using no AD  5. Gait  x 150 feet with Minimal Assistance using no AD   6. Stand for 5 minutes with Stand-by Assistance using no AD                      History:     Past Medical History:   Diagnosis Date    Arthritis     Cardiomyopathy     CHF (congestive heart failure)     Coronary artery disease     Encounter for blood transfusion     Gastric polyp     Hyperlipidemia     Hypertension     Hypotension, iatrogenic     Iron deficiency anemia due to chronic blood loss 10/15/2018    LVAD (left ventricular assist device) present     Obesity     S/P implantation of automatic cardioverter/defibrillator (AICD)     Ventricular tachycardia        Past Surgical History:   Procedure Laterality Date    ABDOMINAL SURGERY      APPENDECTOMY      CARDIAC CATHETERIZATION      CARDIAC DEFIBRILLATOR PLACEMENT      CARDIAC DEFIBRILLATOR PLACEMENT      CLOSURE-STERNAL WOUND N/A 7/28/2017    Performed by [facility] Downing [facility] at [facility] OR 2ND FLR    COLONOSCOPY      COLONOSCOPY N/A 3/9/2018    Procedure: COLONOSCOPY;  Surgeon: Andres Chatterjee MD;  Location: [facility] (2ND FLR);  Service: Endoscopy;  Laterality: N/A;    COLONOSCOPY N/A 8/8/2018    Procedure: COLONOSCOPY;  Surgeon: Andres Chatterjee MD;  Location: [facility] (2ND FLR);  Service: Endoscopy;  Laterality: N/A;    COLONOSCOPY N/A 8/8/2018    Performed by Andres Chatterjee MD at [facility] ENDO (2ND FLR)    COLONOSCOPY N/A 3/9/2018    Performed by Andres Chatterjee MD at [facility] (2ND FLR)    DEVICE ASSISTED ENTEROSCOPY-ANTEROGRADE N/A 1/25/2018    Performed by Andres Chatterjee MD at [facility] (2ND FLR)    DEVICE ASSISTED ENTEROSCOPY-ANTEROGRADE N/A 12/14/2017    Performed by Andres Chatterjee MD at [facility] (2ND FLR)    EGD (ESOPHAGOGASTRODUODENOSCOPY) N/A 10/26/2018    Performed by Jessica Casillas MD at [facility] (2ND FLR)    EGD  (ESOPHAGOGASTRODUODENOSCOPY) N/A 8/8/2018    Performed by Andres Chatterjee MD at [facility] ENDO (2ND FLR)    ESOPHAGOGASTRODUODENOSCOPY      ESOPHAGOGASTRODUODENOSCOPY N/A 8/8/2018    Procedure: EGD (ESOPHAGOGASTRODUODENOSCOPY);  Surgeon: Andres Chattereje MD;  Location: [facility] ENDO (2ND FLR);  Service: Endoscopy;  Laterality: N/A;    ESOPHAGOGASTRODUODENOSCOPY N/A 10/26/2018    Procedure: EGD (ESOPHAGOGASTRODUODENOSCOPY);  Surgeon: Jesisca Casillas MD;  Location: [facility] ENDO (2ND FLR);  Service: Endoscopy;  Laterality: N/A;  Push endoscopy    ESOPHAGOGASTRODUODENOSCOPY (EGD) N/A 3/6/2018    Performed by Adnres Chatterjee MD at [facility] ENDO (2ND FLR)    ESOPHAGOGASTRODUODENOSCOPY (EGD) N/A 12/8/2017    Performed by Gagan Barger MD at [facility] ENDO (2ND FLR)    ESOPHAGOGASTRODUODENOSCOPY (EGD) N/A 8/17/2017    Performed by Kishore Mills MD at [facility] ENDO (2ND FLR)    EXPLORATORY-LAPAROTOMY with small bowel resection  N/A 3/13/2018    Performed by Kenyon Tellez MD at [facility] OR 2ND FLR    HEART CATH-RIGHT Right 7/3/2017    Performed by Angie Torres MD at [facility] CATH LAB    INSERTION-LEFT VENTRICULAR ASSIST DEVICE N/A 7/27/2017    Performed by Sunny Downing MD at [facility] OR 2ND FLR    INSERTION-LEFT VENTRICULAR ASSIST DEVICE N/A 7/25/2017    Performed by Sunny Downing MD at [facility] OR 2ND FLR    LEFT VENTRICULAR ASSIST DEVICE  07/27/2017    PLACEMENT-NEOPERICARDIUM N/A 7/28/2017    Performed by Sunny Downing MD at [facility] OR 2ND FLR    RESECTION-BOWEL  3/13/2018    Performed by Kenyon Tellez MD at [facility] OR 2ND FLR    Retrograde deivce assisted enteroscopy N/A 1/26/2018    Performed by Jesse Davis MD at [facility] ENDO (2ND FLR)    REVISION-LEAD-ICD N/A 11/20/2017    Performed by Rubén Winchester MD at [facility] CATH LAB    TONSILLECTOMY      TRANSESOPHAGEAL ECHOCARDIOGRAM (GLENN) N/A 1/9/2018    Performed by [facility] Diagnostic Provider at [facility] CATH LAB       Clinical Decision Making:     History  Co-morbidities  and personal factors that may impact the plan of care Examination  Body Structures and Functions, activity limitations and participation restrictions that may impact the plan of care Clinical Presentation   Decision Making/ Complexity Score   Co-morbidities:   [] Time since onset of injury / illness / exacerbation  [x] Status of current condition  []Patient's cognitive status and safety concerns    [x] Multiple Medical Problems (see med hx)  Personal Factors:   [x] Patient's age  [] Prior Level of function   [] Patient's home situation (environment and family support)  [] Patient's level of motivation  [] Expected progression of patient      HISTORY:(criteria)    [] 16664 - no personal factors/history    [] 06717 - has 1-2 personal factor/comorbidity     [x] 83103 - has >3 personal factor/comorbidity     Body Regions:  [] Objective examination findings  [] Head     []  Neck  [x] Trunk   [] Upper Extremity   [] Lower Extremity    Body Systems:  [] For communication ability, affect, cognition, language, and learning style: the assessment of the ability to make needs known, consciousness, orientation (person, place, and time), expected emotional /behavioral responses, and learning preferences (eg, learning barriers, education  needs)  [x] For the neuromuscular system: a general assessment of gross coordinated movement (eg, balance, gait, locomotion, transfers, and transitions) and motor function  (motor control and motor learning)  [] For the musculoskeletal system: the assessment of gross symmetry, gross range of motion, gross strength, height, and weight  [x] For the integumentary system: the assessment of pliability(texture), presence of scar formation, skin color, and skin integrity  [x] For cardiovascular/pulmonary system: the assessment of heart rate, respiratory rate, blood pressure, and edema     Activity limitations:    [] Patient's cognitive status and saf ety concerns          [x] Status of current  condition      [] Weight bearing restriction  [x] Cardiopulmunary Restriction    Participation Restrictions:   [] Goals and goal agreement with the patient     [] Rehab potential (prognosis) and probable outcome      Examination of Body System: (criteria)    [] 36286 - addressing 1-2 elements    [x] 19048 - addressing a total of 3 or more elements     [] 13761 -  Addressing a total of 4 or more elements         Clinical Presentation: (criteria)  Stable - 43250     On examination of body system using standardized tests and measures patient presents with 3 or more elements from any of the following: body structures and functions, activity limitations, and/or participation restrictions.  Leading to a clinical presentation that is considered stable and/or uncomplicated                              Clinical Decision Making  (Eval Complexity):  Low- 08402     Time Tracking:     PT Received On: 11/27/18  PT Start Time: 1422     PT Stop Time: 1453  PT Total Time (min): 31 min     Billable Minutes: Evaluation 21 and Gait Training 10       America Whitehead, SPT  11/27/2018

## 2018-11-27 NOTE — PLAN OF CARE
Problem: Patient Care Overview  Goal: Plan of Care Review  Outcome: Ongoing (interventions implemented as appropriate)    No acute events throughout the shift. See vital signs and assessments in flowsheets. See below for updates on today's progress.     Pulmonary: remains in room air with oxygen saturation of 96-98%    Cardiovascular: Paced rhythm at 80bpm. There still abnormal rhythm showing in the monitor (AF, VT and Marcio). Doppler BP 70-75mmHg    Neurological: Oriented to person, place, situation and time    Gastrointestinal: on regula diet    Genitourinary: Voids per urinal    Endocrine: no endocrine dysfunction noted    Integumentary/Other: skin lesions in scrotum (boils)    Infusions: KVO    Patient progressing towards goals as tolerated, plan of care communicated and reviewed with Suman Hayden and family. All concerns addressed. Will continue to monitor.

## 2018-11-27 NOTE — SUBJECTIVE & OBJECTIVE
Interval History:   MAIDA  Repeat blood cultures today  Seen with wife at bedside  Reports drainage from driveline much improved  No complaints at this time.     Review of Systems   Constitutional: Negative for activity change, appetite change, chills, diaphoresis and fever.   HENT: Negative for congestion.    Respiratory: Negative for cough, chest tightness and shortness of breath.    Cardiovascular: Negative for chest pain.   Gastrointestinal: Negative for abdominal distention, abdominal pain, diarrhea, nausea and vomiting.   Genitourinary: Positive for genital sores. Negative for difficulty urinating.   Musculoskeletal: Negative for arthralgias.   Skin: Positive for wound.   Neurological: Negative for tremors, syncope and speech difficulty.   Psychiatric/Behavioral: Negative for agitation.     Objective:     Vital Signs (Most Recent):  Temp: 99.8 °F (37.7 °C) (11/27/18 1100)  Pulse: (!) 116 (11/27/18 1100)  Resp: (!) 51 (11/27/18 1100)  BP: (!) 85/51 (11/27/18 1100)  SpO2: 98 % (11/27/18 1000) Vital Signs (24h Range):  Temp:  [99.5 °F (37.5 °C)-100.3 °F (37.9 °C)] 99.8 °F (37.7 °C)  Pulse:  [] 116  Resp:  [3-51] 51  SpO2:  [96 %-98 %] 98 %  BP: ()/(0-115) 85/51     Weight: 78.5 kg (173 lb)  Body mass index is 25.55 kg/m².    Estimated Creatinine Clearance: 88.4 mL/min (based on SCr of 0.8 mg/dL).    Physical Exam   Constitutional: He is oriented to person, place, and time. No distress.   HENT:   Mouth/Throat: Oropharynx is clear and moist.   Eyes: Pupils are equal, round, and reactive to light.   Neck: Neck supple.   Cardiovascular:   Normal VAD hum    Pulmonary/Chest: Effort normal.   Abdominal: Soft. He exhibits no distension and no mass. There is no tenderness. There is no guarding.   Genitourinary:   Genitourinary Comments: Three boils present on testicles.  2 of which were openly draining   Musculoskeletal: He exhibits no edema.   Neurological: He is alert and oriented to person, place, and time.  No cranial nerve deficit.   Skin: Skin is warm and dry.   Psychiatric: He has a normal mood and affect. His behavior is normal.       Significant Labs:   Blood Culture:   Recent Labs   Lab 10/13/18  2007 11/23/18  1613 11/24/18  1215 11/24/18  1220   LABBLOO No growth after 5 days. Gram stain clement bottle: Gram positive cocci in clusters resembling Staph  Results called to and read back by: Sharlene Landon RN 11/24/2018  09:36  METHICILLIN RESISTANT STAPHYLOCOCCUS AUREUS  ID consult required at [facility].  For susceptibility see order #[id number]    Gram stain clement bottle: Gram positive cocci in clusters resembling Staph  Results called to and read back by: Sharlene Landon RN 11/24/2018  09:35  Gram stain aer bottle: Gram positive cocci in clusters resembling Staph  METHICILLIN RESISTANT STAPHYLOCOCCUS AUREUS  ID consult required at [facility].   Gram stain aer bottle: Gram positive cocci in clusters resembling Staph   Results called to and read back by: Trinity Forbes RN 11/25/2018  06:35  METHICILLIN RESISTANT STAPHYLOCOCCUS AUREUS  ID consult required at [facility].   Gram stain aer bottle: Gram positive cocci in clusters resembling Staph   Results called to and read back by: Trinity Forbes RN 11/25/2018  06:35  METHICILLIN RESISTANT STAPHYLOCOCCUS AUREUS  ID consult required at [facility].  For susceptibility see order #[id number]       CBC:   Recent Labs   Lab 11/26/18 0424 11/27/18 0409   WBC 18.52* 15.23*   HGB 8.2* 8.1*   HCT 27.3* 26.1*   * 141*     CMP:   Recent Labs   Lab 11/26/18 0424 11/27/18 0409   NA  --  133*   K  --  3.9   CL  --  104   CO2  --  25   GLU  --  89   BUN  --  20   CREATININE  --  0.8   CALCIUM  --  8.0*   PROT 5.2* 5.4*   ALBUMIN 2.0* 2.1*   BILITOT 0.9 1.2*   ALKPHOS 89 83   * 104*   ALT 91*  87*   ANIONGAP  --  4*   EGFRNONAA  --  >60.0     Wound Culture:   Recent Labs   Lab 11/23/18  1448   LABAERO METHICILLIN RESISTANT STAPHYLOCOCCUS AUREUS  Moderate       All pertinent labs within the past 24 hours have been reviewed.    Significant Imaging: I have reviewed all pertinent imaging results/findings within the past 24 hours.

## 2018-11-27 NOTE — PROGRESS NOTES
11/27/2018  Arsh Rogers    Current provider:  Angie Torres MD      I, Arsh Rogers, rounded on Suman Hayden to ensure all mechanical assist device settings (IABP or VAD) were appropriate and all parameters were within limits.  I was able to ensure all back up equipment was present, the staff had no issues, and the Perfusion Department daily rounding was complete.    10:30 AM

## 2018-11-27 NOTE — ASSESSMENT & PLAN NOTE
-HM3 5200 rpm, DT therapy  -Not on home diuretics  -INR theraputic, resume coumadin   -LDH stable    Procedure: Device Interrogation Including analysis of device parameters  Current Settings: Ventricular Assist Device  Review of device function is stable/unstable stable    TXP LVAD INTERROGATIONS 11/27/2018 11/27/2018 11/27/2018 11/27/2018 11/27/2018 11/27/2018 11/27/2018   Type HeartMate3 HeartMate3 HeartMate3 HeartMate3 HeartMate3 HeartMate3 HeartMate3   Flow 4.1 4.2 4.4 4.4 4.4 4.4 4.4   Speed 5200 5200 5200 5250 5200 5250 5200   PI 3.4 3.0 2.8 2.8 3.3 2.8 2.5   Power ([place]) 3.5 3.5 3.5 3.6 3.5 3.6 3.6   LSL - - - - - - -   Pulsatility Intermittent pulse Intermittent pulse Intermittent pulse Intermittent pulse Intermittent pulse Intermittent pulse Intermittent pulse

## 2018-11-27 NOTE — PT/OT/SLP EVAL
Occupational Therapy   Evaluation and Discharge Note    Name: Suman Hayden  MRN: [id number]  Admitting Diagnosis:  Severe sepsis  ; old LVAD admitted from clinic with DL infection    Recommendations:     Discharge Recommendations: home  Discharge Equipment Recommendations:  none  Barriers to discharge:  None    History:     Occupational Profile:  Lives with spouse who is available to assist; pt was (I) with ADL and ambulation; enjoys cooking; does not drive; owns no DME    Past Medical History:   Diagnosis Date    Arthritis     Cardiomyopathy     CHF (congestive heart failure)     Coronary artery disease     Encounter for blood transfusion     Gastric polyp     Hyperlipidemia     Hypertension     Hypotension, iatrogenic     Iron deficiency anemia due to chronic blood loss 10/15/2018    LVAD (left ventricular assist device) present     Obesity     S/P implantation of automatic cardioverter/defibrillator (AICD)     Ventricular tachycardia        Past Surgical History:   Procedure Laterality Date    ABDOMINAL SURGERY      APPENDECTOMY      CARDIAC CATHETERIZATION      CARDIAC DEFIBRILLATOR PLACEMENT      CARDIAC DEFIBRILLATOR PLACEMENT      CLOSURE-STERNAL WOUND N/A 7/28/2017    Performed by Sunny Downing MD at [facility] OR 2ND FLR    COLONOSCOPY      COLONOSCOPY N/A 3/9/2018    Procedure: COLONOSCOPY;  Surgeon: Andres Chatterjee MD;  Location: [facility] ([address] [place] [address]);  Service: Endoscopy;  Laterality: N/A;    COLONOSCOPY N/A 8/8/2018    Procedure: COLONOSCOPY;  Surgeon: Andres Chatterjee MD;  Location: [facility] ([address] [place] [address]);  Service: Endoscopy;  Laterality: N/A;    COLONOSCOPY N/A 8/8/2018    Performed by Andres Chatterjee MD at [facility] ENDO (2ND FLR)    COLONOSCOPY N/A 3/9/2018    Performed by Andres Chatterjee MD at [facility] ENDO (2ND FLR)    DEVICE ASSISTED ENTEROSCOPY-ANTEROGRADE N/A 1/25/2018    Performed by Andres Chatterjee MD at [facility] ENDO (2ND FLR)    DEVICE ASSISTED ENTEROSCOPY-ANTEROGRADE N/A  12/14/2017    Performed by Andres Chat[facility]jee MD at [facility] ENDO (2ND FLR)    EGD (ESOPHAGOGASTRODUODENOSCOPY) N/A 10/26/2018    Performed by Jessica Casillas MD at [facility] ENDO (2ND FLR)    EGD (ESOPHAGOGASTRODUODENOSCOPY) N/A 8/8/2018    Performed by Andres Chatterjee MD at [facility] ENDO (2ND FLR)    ESOPHAGOGASTRODUODENOSCOPY      ESOPHAGOGASTRODUODENOSCOPY N/A 8/8/2018    Procedure: EGD (ESOPHAGOGASTRODUODENOSCOPY);  Surgeon: Andres Chatterjee MD;  Location: [facility] ENDO (2ND FLR);  Service: Endoscopy;  Laterality: N/A;    ESOPHAGOGASTRODUODENOSCOPY N/A 10/26/2018    Procedure: EGD (ESOPHAGOGASTRODUODENOSCOPY);  Surgeon: Jessica Casillas MD;  Location: [facility] ENDO (2ND FLR);  Service: Endoscopy;  Laterality: N/A;  Push endoscopy    ESOPHAGOGASTRODUODENOSCOPY (EGD) N/A 3/6/2018    Performed by Andres Chatterjee MD at [facility] ENDO (2ND FLR)    ESOPHAGOGASTRODUODENOSCOPY (EGD) N/A 12/8/2017    Performed by Gagan Barger MD at [facility] ENDO (2ND FLR)    ESOPHAGOGASTRODUODENOSCOPY (EGD) N/A 8/17/2017    Performed by Kishore Mills MD at [facility] ENDO (2ND FLR)    EXPLORATORY-LAPAROTOMY with small bowel resection  N/A 3/13/2018    Performed by Kenyon Tellez MD at [facility] OR 2ND FLR    HEART CATH-RIGHT Right 7/3/2017    Performed by Angie Torres MD at [facility] CATH LAB    INSERTION-LEFT VENTRICULAR ASSIST DEVICE N/A 7/27/2017    Performed by Sunny Downing MD at [facility] OR 2ND FLR    INSERTION-LEFT VENTRICULAR ASSIST DEVICE N/A 7/25/2017    Performed by Sunny Downing MD at [facility] OR 2ND FLR    LEFT VENTRICULAR ASSIST DEVICE  07/27/2017    PLACEMENT-NEOPERICARDIUM N/A 7/28/2017    Performed by Sunny Downing MD at [facility] OR 2ND FLR    RESECTION-BOWEL  3/13/2018    Performed by Kenyon Tellez MD at [facility] OR 2ND FLR    Retrograde deivce assisted enteroscopy N/A 1/26/2018    Performed by Jesse Davis MD at [facility] ENDO (2ND FLR)    REVISION-LEAD-ICD N/A 11/20/2017    Performed by Rubén Winchester MD at [facility] CATH LAB     TONSILLECTOMY      TRANSESOPHAGEAL ECHOCARDIOGRAM (GLENN) N/A 1/9/2018    Performed by [facility] Provider at [facility] CATH LAB       Subjective     Chief Complaint: denies  Patient/Family stated goals: to go home  Communicated with: RN prior to session.  Pain/Comfort:  · Pain Rating 1: 0/10  · Pain Rating Post-Intervention 1: 0/10    Patients cultural, spiritual, Restorationist conflicts given the current situation: None reported    Objective:     Patient found with: telemetry, blood pressure cuff, pulse ox (continuous), central line, LVAD    General Precautions: Standard, fall   Orthopedic Precautions:    Braces:       Occupational Performance:    Bed Mobility:    · NT as pt UIC upon OT arrival    Functional Mobility/Transfers:  · Patient completed Sit <> Stand Transfer with contact guard assistance  with  no assistive device   · Patient completed Toilet Transfer Step Transfer technique with contact guard assistance with  no AD  · Functional Mobility: CGA to/from sink and to bathroom    Activities of Daily Living:  · Feeding:  independence    · Grooming: independence    · Upper Body Dressing: modified independence    · Lower Body Dressing: contact guard assistance for stand phase; able to don socks/shoes without A   · Toileting: minimum assistance      Cognitive/Visual Perceptual:  Oriented to: Person, Place, Time and Situation  Follows Commands/attention: Follows multistep  commands  Communication: clear/fluent  Memory:  No Deficits noted  Safety awareness/insight to disability: intact  Coping skills/emotional control: Appropriate to situation    Physical Exam:  Postural examination/scapula alignment:    -       No postural abnormalities identified  Sensation:    -       Intact  Upper Extremity Range of Motion:     -       Right Upper Extremity: WFL  -       Left Upper Extremity: WFL  Upper Extremity Strength:    -       Right Upper Extremity: WFL  -       Left Upper Extremity: WFL   Strength:    -       Right  "Upper Extremity: WFL  -       Left Upper Extremity: WFL  Fine Motor Coordination:    -       Intact  Gross motor coordination:   WFL    Patient left seated on toilet  with all lines intact, call button in reach, rn notified and spouse present    [facility] 6 Click:  [facility] Total Score: 21    Treatment & Education:  Pt ed on OT POC  Pt demonstrated (I) with LVAD management during ADL  Pt performed functional mobility around room for self-care tasks and to bathroom for toileting  Pt requesting to sit on toilet for a while; pt ed on pulling cord when done for RN to assist; RN called and aware  Education:    Assessment:     Suman Hayden is a 68 y.o. male with a medical diagnosis of Severe sepsis. At this time, patient is functioning at their prior level of function and does not require further acute OT services.     Clinical Decision Makin.  OT Low:  "Pt evaluation falls under low complexity for evaluation coding due to performance deficits noted in 1-3 areas as stated above and 0 co-morbities affecting current functional status. Data obtained from problem focused assessments. No modifications or assistance was required for completion of evaluation. Only brief occupational profile and history review completed."     Plan:     During this hospitalization, patient does not require further acute OT services.  Please re-consult if situation changes.    · Plan of Care Reviewed with: patient, spouse    This Plan of care has been discussed with the patient who was involved in its development and understands and is in agreement with the identified goals and treatment plan    GOALS:   Multidisciplinary Problems     Occupational Therapy Goals        Problem: Occupational Therapy Goal    Goal Priority Disciplines Outcome Interventions   Occupational Therapy Goal     OT, PT/OT Ongoing (interventions implemented as appropriate)                    Time Tracking:     OT Date of Treatment: 18  OT Start Time:   OT Stop " Time: 1147  OT Total Time (min): 28 min    Billable Minutes:Evaluation 10  Self Care/Home Management 15    DELMY Lucero  11/27/2018

## 2018-11-27 NOTE — PROGRESS NOTES
[facility]  Heart Transplant  Progress Note    Patient Name: Suman Hayden  MRN: [id number]  Admission Date: 11/23/2018  Hospital Length of Stay: 4 days  Attending Physician: Angie Torres MD  Primary Care Provider: Joe Ernst MD  Principal Problem:Severe sepsis    Subjective:     Interval History: no issues overnight, did not work with PT but sat in chair     Continuous Infusions:  Scheduled Meds:   amiodarone  400 mg Oral BID    dronabinol  5 mg Oral BID    ferrous sulfate  325 mg Oral TID    lisinopril  5 mg Oral Daily    mupirocin   Topical (Top) Daily    norepinephrine bitartrate-D5W        pantoprazole  40 mg Oral BID    vancomycin (VANCOCIN) IVPB  750 mg Intravenous Q12H    warfarin  4 mg Oral Daily     PRN Meds:acetaminophen, polyethylene glycol, polyethylene glycol    Review of patient's allergies indicates:   Allergen Reactions    Asa [aspirin] Other (See Comments)     Bleeding ulcer     Objective:     Vital Signs (Most Recent):  Temp: 99.5 °F (37.5 °C) (11/27/18 0700)  Pulse: 100 (11/27/18 0900)  Resp: (!) 33 (11/27/18 0900)  BP: (!) 72/0 (11/27/18 0900)  SpO2: 98 % (11/27/18 0400) Vital Signs (24h Range):  Temp:  [99.5 °F (37.5 °C)-100.3 °F (37.9 °C)] 99.5 °F (37.5 °C)  Pulse:  [] 100  Resp:  [3-41] 33  SpO2:  [96 %-98 %] 98 %  BP: ()/(0-115) 72/0     Patient Vitals for the past 72 hrs (Last 3 readings):   Weight   11/26/18 0600 78.5 kg (173 lb)     Body mass index is 25.55 kg/m².      Intake/Output Summary (Last 24 hours) at 11/27/2018 1001  Last data filed at 11/27/2018 0900  Gross per 24 hour   Intake 1090 ml   Output 900 ml   Net 190 ml       Hemodynamic Parameters:           Physical Exam   Constitutional: He is oriented to person, place, and time. No distress.   HENT:   Mouth/Throat: Oropharynx is clear and moist.   Eyes: Pupils are equal, round, and reactive to light.   Neck: Neck supple. JVD present.   Cardiovascular:   Normal VAD hum     Pulmonary/Chest: Effort normal.   Abdominal: Soft. He exhibits no distension and no mass. There is no tenderness. There is no guarding.   Genitourinary:   Genitourinary Comments: Three boils present on testicles.  2 of which were openly draining   Musculoskeletal: He exhibits no edema.   Neurological: He is alert and oriented to person, place, and time. No cranial nerve deficit.   Skin: Skin is warm and dry.   Psychiatric: He has a normal mood and affect. His behavior is normal.       Significant Labs:  CBC:  Recent Labs   Lab 11/25/18  0850 11/26/18 0424 11/27/18  0409   WBC 19.66* 18.52* 15.23*   RBC 3.82* 3.52* 3.38*   HGB 9.1* 8.2* 8.1*   HCT 30.9* 27.3* 26.1*   * 130* 141*   MCV 81* 78* 77*   MCH 23.8* 23.3* 24.0*   MCHC 29.4* 30.0* 31.0*     BNP:  Recent Labs   Lab 11/23/18  1426 11/23/18  1612 11/26/18  0424   BNP 1,873* 1,204* 1,512*     CMP:  Recent Labs   Lab 11/24/18  0346 11/25/18  0705 11/26/18  0424 11/27/18  0409   GLU 81 132*  --  89   CALCIUM 8.0* 8.2*  --  8.0*   ALBUMIN  --  2.3* 2.0* 2.1*   PROT  --  5.7* 5.2* 5.4*   * 130*  --  133*   K 4.9 4.3  --  3.9   CO2 14* 20*  --  25    104  --  104   BUN 27* 30*  --  20   CREATININE 1.4 1.3  --  0.8   ALKPHOS  --  68 89 83   ALT  --  105* 91* 87*   AST  --  142* 105* 104*   BILITOT  --  1.2* 0.9 1.2*      Coagulation:   Recent Labs   Lab 11/25/18  0248 11/25/18  0850 11/26/18  0424 11/27/18  0409   INR 3.8* 4.0* 3.7* 2.5*   APTT 27.3  --  40.8* 36.8*     LDH:  No results for input(s): LDH in the last 72 hours.  Microbiology:  Microbiology Results (last 7 days)     Procedure Component Value Units Date/Time    Blood culture [[id number]] Collected:  11/24/18 1220    Order Status:  Completed Specimen:  Blood from Peripheral, Wrist, Right Updated:  11/27/18 0950     Blood Culture, Routine Gram stain aer bottle: Gram positive cocci in clusters resembling Staph      Blood Culture, Routine Results called to and read back by: Trinity Forbes,  RN 11/25/2018  06:35     Blood Culture, Routine --     METHICILLIN RESISTANT STAPHYLOCOCCUS AUREUS  ID consult required at [facility].  For susceptibility see order #[id number]      Blood culture [[id number]]  (Susceptibility) Collected:  11/24/18 1215    Order Status:  Completed Specimen:  Blood from Peripheral, Forearm, Right Updated:  11/27/18 0950     Blood Culture, Routine Gram stain aer bottle: Gram positive cocci in clusters resembling Staph      Blood Culture, Routine Results called to and read back by: Trinity Forbes RN 11/25/2018  06:35     Blood Culture, Routine --     METHICILLIN RESISTANT STAPHYLOCOCCUS AUREUS  ID consult required at [facility].      Blood culture [[id number]] Collected:  11/27/18 0446    Order Status:  Sent Specimen:  Blood from Peripheral, Antecubital, Left Updated:  11/27/18 0531    Blood culture [[id number]] Collected:  11/27/18 0449    Order Status:  Sent Specimen:  Blood from Peripheral, Antecubital, Right Updated:  11/27/18 0530    Blood culture x two cultures. Draw prior to antibiotics. [[id number]] Collected:  11/23/18 1613    Order Status:  Completed Specimen:  Blood from Peripheral, Antecubital, Left Updated:  11/26/18 1013     Blood Culture, Routine Gram stain clement bottle: Gram positive cocci in clusters resembling Staph     Blood Culture, Routine Results called to and read back by: Sharlene Landon RN 11/24/2018  09:36     Blood Culture, Routine --     METHICILLIN RESISTANT STAPHYLOCOCCUS AUREUS  ID consult required at [facility] and [facility].  For susceptibility see order #[id number]      Narrative:       Aerobic and anaerobic    Blood culture x two cultures. Draw prior to antibiotics. [[id number]]  (Susceptibility) Collected:  11/23/18 1613    Order Status:  Completed Specimen:  Blood from Peripheral, Hand, Left Updated:  11/26/18 1012     Blood Culture, Routine  Gram stain clement bottle: Gram positive cocci in clusters resembling Staph     Blood Culture, Routine Results called to and read back by: Sharlene Landon RN 11/24/2018  09:35     Blood Culture, Routine Gram stain aer bottle: Gram positive cocci in clusters resembling Staph     Blood Culture, Routine --     METHICILLIN RESISTANT STAPHYLOCOCCUS AUREUS  ID consult required at [facility] and [facility]   locations.      Narrative:       Aerobic and anaerobic    Blood culture [[id number]]     Order Status:  Canceled Specimen:  Blood     Blood culture [[id number]]     Order Status:  Canceled Specimen:  Blood           I have reviewed all pertinent labs within the past 24 hours.    Estimated Creatinine Clearance: 88.4 mL/min (based on SCr of 0.8 mg/dL).    Diagnostic Results:  I have reviewed and interpreted all pertinent imaging results/findings within the past 24 hours.    Assessment and Plan:     68 year old male with PMHx significant for HFrEF secondary to non ischemic cardiomyopathy underwent Heartmate  3 LVAD implanted as DT therapy 7/27/17 and VT on amiodarone s/p ICD (with revision in 11/2017 complicated by pocket hematoma). Presents to ED today from clinic for worsening DLES infection and fever. Patient seen by VAD nurse and ID staff who feel admission to [facility] [place] with IV antibiotics warranted. Patient also with fevers, chills, decreased appetite, scrotal boils with spontaneous purulent discharge/rupture, urinary incontinence and confusion at home. History provided by patient and supplemented by his wife. He denies recent travel or being around sick contacts. He is complaint with taking his home medications. Of note, he was recently hospitalized for GIB, history of ileal ulcer s/p intervention. Being followed also by EP by Dr. Winchester for possible future VT ablation. Pt denies recent ICD shocks as well as low flow alarms from his VAD.  In the ER, he was found febrile with Tmax of 103 deg F, doppler BP  60-70's and HR 70s. He was given 2 Liters NS IVFs, cefepime/vancomycin and resumed on home meds except aldactone. Blood cultures sent as well as gram stain and culture of specimen from drive line. CXR showed left small pleural effusion and CT abdomen performed that showed small volume fluid inferior aspect of LVAD along with small pericardial effusion. Labs notable for leukocytosis, mild INDIRA, elevated AST/ALT/T.bili, BNP and lactate 2.7 as well as CRP of 215. LDH was 294. VAD was interrogated and noted to have PI events. Pt was admitted to the ICU for further management/care.     * Severe sepsis    -pt presented with fever, found to have leukocytosis, elevated lactate/CRP and mild INDIRA  -sources potentially: drive line infection (purulent discharge- sampled already and sent to lab; GS showed rare WBCs, CT abdomen showed small volume of fluid inferior aspect of VAD) vs skin infection (scrotal area with boils) vs UTI (pt with frequency, concentrated/dark urine)   -c/w broad spectrum antibiotics   -f/u BCx, lactate is wnl, procalc is elevated.  -ID consulted for rec's on antibiotic selection/management. On IV vanc  -Derm consulted for management of scrotal skin issue. Continue Mucinprocin   -Tylenol PRN (low dose, given elevated LFT's), Ice packs PRN      Transaminitis    -likely due to sepsis vs effects of amiodarone  -will trend LFT's   -CT abdomen: liver is normal in size and attenuation without focal hepatic lesion.  There is gallbladder sludge.  The spleen, adrenal glands, and pancreas are unremarkable.   -holding home pravastatin     Infection, skin    -worsening DLES infection with fever 102  - Send blood culutres  - ID consult, will start broad spectrum abx with vanc and cefepime (given one dose of each in ED already)  -will get imaging with CT to look for further infection, abscess      INDIRA (acute kidney injury)    -mild elevation in serum Cr (1.4, from baseline 1.2)  -holding home aldactone, s/p IVF's (2L NS)    -encourage PO fluid intake, no more IVF's   -bladder scan q6hrs, PRN ISC for PVR >300 cc       Iron deficiency anemia due to chronic blood loss    -c/w home oral iron supplementation tablets     LVAD (left ventricular assist device) present    -HM3 5200 rpm, DT therapy  -Not on home diuretics  -INR theraputic, resume coumadin   -LDH stable    Procedure: Device Interrogation Including analysis of device parameters  Current Settings: Ventricular Assist Device  Review of device function is stable/unstable stable    TXP LVAD INTERROGATIONS 11/27/2018 11/27/2018 11/27/2018 11/27/2018 11/27/2018 11/27/2018 11/27/2018   Type HeartMate3 HeartMate3 HeartMate3 HeartMate3 HeartMate3 HeartMate3 HeartMate3   Flow 4.1 4.2 4.4 4.4 4.4 4.4 4.4   Speed 5200 5200 5200 5250 5200 5250 5200   PI 3.4 3.0 2.8 2.8 3.3 2.8 2.5   Power ([place]) 3.5 3.5 3.5 3.6 3.5 3.6 3.6   LSL - - - - - - -   Pulsatility Intermittent pulse Intermittent pulse Intermittent pulse Intermittent pulse Intermittent pulse Intermittent pulse Intermittent pulse        Chronic combined systolic and diastolic congestive heart failure    -pt presently initially dry with sepsis, s/p IVF hydration, labs notable for elevated BNP, CXR and CT show left pleural effusion, pt saturating well on 1 Liter oxygen via NC   -c/w home lisinopril, holding home aldactone b/c of elevated serum Cr.     Ventricular tachyarrhythmia    - EP consulted.  - Successfully ATP'd out of VT  - Continuing Amio gtt.   - Increased ATP burst intervals and lowered detection zone  - Activated tachytherapies as well see EP note.      Essential hypertension    -c/w home lisinopril, currently holding home aldactone  -goal MAP less than 90           MELISSA Long  Heart Transplant  [facility]

## 2018-11-27 NOTE — PLAN OF CARE
Problem: Patient Care Overview  Goal: Plan of Care Review  Outcome: Ongoing (interventions implemented as appropriate)    No acute events throughout day. See vital signs and assessments in flowsheets. See below for updates on today's progress.     Pulmonary: Intermittent SpO2 98-99 on RA.     Cardiovascular: Paced rhythm. No VAD alarms. Doppler pressures 60-72.     Neurological: AAOx4     Gastrointestinal: BM today.     Genitourinary: Voiding per urinal.     Endocrine: See labs.     Integumentary/Other: VAD site clean dry and intact. Due to be changed per pts wife this evening.     Infusions: NA    Patient progressing towards goals as tolerated, Plan to step down. Waiting for bed. Plan of care communicated and reviewed with Suman Hayden and family. All concerns addressed. Will continue to monitor.

## 2018-11-27 NOTE — PLAN OF CARE
Notified by the [facility] the 11/23/2018 LVAD DLES and blood cultures are positive for MRSA.  Follow contact isolation, utilize appropriate PPE for patient encounters and sanitize hands after PPE removed.  Call the Infection Prevention dept for isolation discontinuation criteria.

## 2018-11-27 NOTE — PLAN OF CARE
Problem: Physical Therapy Goal  Goal: Physical Therapy Goal  Goals to be met by: 18     Patient will increase functional independence with mobility by performin. Supine to sit with [place]  2. Sit to supine with [place]  3. Sit to stand transfer with Stand-by Assistance  4. Bed to chair transfer with Stand-by Assistance using no AD  5. Gait  x 150 feet with Minimal Assistance using no AD   6. Stand for 5 minutes with Stand-by Assistance using no AD    Outcome: Ongoing (interventions implemented as appropriate)  PT kaley completed.    America Whitehead, SPT  18

## 2018-11-27 NOTE — PROGRESS NOTES
[facility]  Infectious Disease  Progress Note    Patient Name: Suman Hayden  MRN: [id number]  Admission Date: 11/23/2018  Length of Stay: 4 days  Attending Physician: Angie Torres MD  Primary Care Provider: Joe Ernst MD    Isolation Status: Contact  Assessment/Plan:      Bacteremia    68 year old male with PMHx NICM s/p LVAD as DT 7/27/17, hx of staph epi bacteremia treated with 6 weeks of IV vancomycin 1/2018 admitted from LVAD clinic for drive line infection. 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    - CT abdomen performed that showed small volume fluid inferior aspect of LVAD. CRP of 215. Elevated WBC   - LVAD DLES +MRSA  - 2D echo negative for vegetations       Plan:  -continue vancomycin. Vancomycin redosed 750 [id number]. vanc trough due tomorrow   -repeat blood cultures today so far negative.   -2D echo negative, as repeat culture are positive recommend GLENN to r/o endocarditis as with presence of ICD  -Pt will likely need long term IV abx  -ID will continue to follow.         Thank you for your consult. I will follow-up with patient. Please contact us if you have any additional questions.    Malika Chavarria PA-C  Infectious Disease  [facility]  [phone]    Subjective:     Principal Problem:Severe sepsis    HPI: 68 year old male with PMHx significant for HFrEF secondary to non ischemic cardiomyopathy underwent Heartmate 3 LVAD implanted as DT therapy 7/27/17, (NYHA class II) and VT on amiodarone s/p ICD (with revision in 11/2017 complicated by pocket hematoma, and prior hospitalization for ICD shocks secondary to atrial tachycardia with abberancy). Presents to ED today from clinic for worsening DLES infection (purulent discharge and blood tinged) and fever. Patient seen by VAD nurse and ID staff who felt admission to [facility] with IV antibiotics warranted. Patient also with fevers, chills, decreased appetite, scrotal boils with spontaneous purulent discharge/rupture,  urinary incontinence and confusion at home for the past three days. In the ER, he was found febrile with Tmax of 103 deg F, doppler BP 60-70's and HR 70s. He was given 2 Liters NS IVFs, cefepime/vancomycin and resumed on home meds except aldactone. Blood cultures sent as well as gram stain and culture of specimen from drive line. CXR showed left small pleural effusion and CT abdomen performed that showed small volume fluid inferior aspect of LVAD along with small pericardial effusion. Labs notable for leukocytosis, mild INDIRA, elevated AST/ALT/T.bili, BNP and lactate 2.7 as well as CRP of 215. LDH was 294.  LVAD DLES- showing Staph Aureus.  Blood cxs showing- gram positive coccis.  Pt and wife today reports much improvement since the start of abx.        Interval History:   CHRISEON  Repeat blood cultures today  Seen with wife at bedside  Reports drainage from driveline much improved  No complaints at this time.     Review of Systems   Constitutional: Negative for activity change, appetite change, chills, diaphoresis and fever.   HENT: Negative for congestion.    Respiratory: Negative for cough, chest tightness and shortness of breath.    Cardiovascular: Negative for chest pain.   Gastrointestinal: Negative for abdominal distention, abdominal pain, diarrhea, nausea and vomiting.   Genitourinary: Positive for genital sores. Negative for difficulty urinating.   Musculoskeletal: Negative for arthralgias.   Skin: Positive for wound.   Neurological: Negative for tremors, syncope and speech difficulty.   Psychiatric/Behavioral: Negative for agitation.     Objective:     Vital Signs (Most Recent):  Temp: 99.8 °F (37.7 °C) (11/27/18 1100)  Pulse: (!) 116 (11/27/18 1100)  Resp: (!) 51 (11/27/18 1100)  BP: (!) 85/51 (11/27/18 1100)  SpO2: 98 % (11/27/18 1000) Vital Signs (24h Range):  Temp:  [99.5 °F (37.5 °C)-100.3 °F (37.9 °C)] 99.8 °F (37.7 °C)  Pulse:  [] 116  Resp:  [3-51] 51  SpO2:  [96 %-98 %] 98 %  BP:  ()/(0-115) 85/51     Weight: 78.5 kg (173 lb)  Body mass index is 25.55 kg/m².    Estimated Creatinine Clearance: 88.4 mL/min (based on SCr of 0.8 mg/dL).    Physical Exam   Constitutional: He is oriented to person, place, and time. No distress.   HENT:   Mouth/Throat: Oropharynx is clear and moist.   Eyes: Pupils are equal, round, and reactive to light.   Neck: Neck supple.   Cardiovascular:   Normal VAD hum    Pulmonary/Chest: Effort normal.   Abdominal: Soft. He exhibits no distension and no mass. There is no tenderness. There is no guarding.   Genitourinary:   Genitourinary Comments: Three boils present on testicles.  2 of which were openly draining   Musculoskeletal: He exhibits no edema.   Neurological: He is alert and oriented to person, place, and time. No cranial nerve deficit.   Skin: Skin is warm and dry.   Psychiatric: He has a normal mood and affect. His behavior is normal.       Significant Labs:   Blood Culture:   Recent Labs   Lab 10/13/18  2007 11/23/18  1613 11/24/18  1215 11/24/18  1220   LABBLOO No growth after 5 days. Gram stain clement bottle: Gram positive cocci in clusters resembling Staph  Results called to and read back by: Sharlene Landon RN 11/24/2018  09:36  METHICILLIN RESISTANT STAPHYLOCOCCUS AUREUS  ID consult required at [facility] and [facility].  For susceptibility see order #[id number]    Gram stain clement bottle: Gram positive cocci in clusters resembling Staph  Results called to and read back by: Sharlene Landon RN 11/24/2018  09:35  Gram stain aer bottle: Gram positive cocci in clusters resembling Staph  METHICILLIN RESISTANT STAPHYLOCOCCUS AUREUS  ID consult required at [facility] and [facility].   Gram stain aer bottle: Gram positive cocci in clusters resembling Staph   Results called to and read back by: Trinity Forbes RN 11/25/2018  06:35  METHICILLIN RESISTANT STAPHYLOCOCCUS AUREUS  ID consult required at [facility] and [facility]   locations.   Gram stain aer bottle: Gram positive cocci in clusters resembling Staph   Results called to and read back by: Trinity Forbes RN 11/25/2018  06:35  METHICILLIN RESISTANT STAPHYLOCOCCUS AUREUS  [facility] and [facility]   locations.  For susceptibility see order #[id number]       CBC:   Recent Labs   Lab 11/26/18 0424 11/27/18  0409   WBC 18.52* 15.23*   HGB 8.2* 8.1*   HCT 27.3* 26.1*   * 141*     CMP:   Recent Labs   Lab 11/26/18 0424 11/27/18  0409   NA  --  133*   K  --  3.9   CL  --  104   CO2  --  25   GLU  --  89   BUN  --  20   CREATININE  --  0.8   CALCIUM  --  8.0*   PROT 5.2* 5.4*   ALBUMIN 2.0* 2.1*   BILITOT 0.9 1.2*   ALKPHOS 89 83   * 104*   ALT 91* 87*   ANIONGAP  --  4*   EGFRNONAA  --  >60.0     Wound Culture:   Recent Labs   Lab 11/23/18  1448   LABAERO METHICILLIN RESISTANT STAPHYLOCOCCUS AUREUS  Moderate       All pertinent labs within the past 24 hours have been reviewed.    Significant Imaging: I have reviewed all pertinent imaging results/findings within the past 24 hours.

## 2018-11-27 NOTE — ASSESSMENT & PLAN NOTE
-pt presented with fever, found to have leukocytosis, elevated lactate/CRP and mild INDRIA  -sources potentially: drive line infection (purulent discharge- sampled already and sent to lab; GS showed rare WBCs, CT abdomen showed small volume of fluid inferior aspect of VAD) vs skin infection (scrotal area with boils) vs UTI (pt with frequency, concentrated/dark urine)   -c/w broad spectrum antibiotics   -f/u BCx, lactate is wnl, procalc is elevated.  -ID consulted for rec's on antibiotic selection/management. On IV vanc  -Derm consulted for management of scrotal skin issue. Continue Mucinprocin   -Tylenol PRN (low dose, given elevated LFT's), Ice packs PRN

## 2018-11-27 NOTE — SUBJECTIVE & OBJECTIVE
Interval History: no issues overnight, did not work with PT but sat in chair     Continuous Infusions:  Scheduled Meds:   amiodarone  400 mg Oral BID    dronabinol  5 mg Oral BID    ferrous sulfate  325 mg Oral TID    lisinopril  5 mg Oral Daily    mupirocin   Topical (Top) Daily    norepinephrine bitartrate-D5W        pantoprazole  40 mg Oral BID    vancomycin (VANCOCIN) IVPB  750 mg Intravenous Q12H    warfarin  4 mg Oral Daily     PRN Meds:acetaminophen, polyethylene glycol, polyethylene glycol    Review of patient's allergies indicates:   Allergen Reactions    Asa [aspirin] Other (See Comments)     Bleeding ulcer     Objective:     Vital Signs (Most Recent):  Temp: 99.5 °F (37.5 °C) (11/27/18 0700)  Pulse: 100 (11/27/18 0900)  Resp: (!) 33 (11/27/18 0900)  BP: (!) 72/0 (11/27/18 0900)  SpO2: 98 % (11/27/18 0400) Vital Signs (24h Range):  Temp:  [99.5 °F (37.5 °C)-100.3 °F (37.9 °C)] 99.5 °F (37.5 °C)  Pulse:  [] 100  Resp:  [3-41] 33  SpO2:  [96 %-98 %] 98 %  BP: ()/(0-115) 72/0     Patient Vitals for the past 72 hrs (Last 3 readings):   Weight   11/26/18 0600 78.5 kg (173 lb)     Body mass index is 25.55 kg/m².      Intake/Output Summary (Last 24 hours) at 11/27/2018 1001  Last data filed at 11/27/2018 0900  Gross per 24 hour   Intake 1090 ml   Output 900 ml   Net 190 ml       Hemodynamic Parameters:           Physical Exam   Constitutional: He is oriented to person, place, and time. No distress.   HENT:   Mouth/Throat: Oropharynx is clear and moist.   Eyes: Pupils are equal, round, and reactive to light.   Neck: Neck supple. JVD present.   Cardiovascular:   Normal VAD hum    Pulmonary/Chest: Effort normal.   Abdominal: Soft. He exhibits no distension and no mass. There is no tenderness. There is no guarding.   Genitourinary:   Genitourinary Comments: Three boils present on testicles.  2 of which were openly draining   Musculoskeletal: He exhibits no edema.   Neurological: He is alert and  oriented to person, place, and time. No cranial nerve deficit.   Skin: Skin is warm and dry.   Psychiatric: He has a normal mood and affect. His behavior is normal.       Significant Labs:  CBC:  Recent Labs   Lab 11/25/18  0850 11/26/18 0424 11/27/18  0409   WBC 19.66* 18.52* 15.23*   RBC 3.82* 3.52* 3.38*   HGB 9.1* 8.2* 8.1*   HCT 30.9* 27.3* 26.1*   * 130* 141*   MCV 81* 78* 77*   MCH 23.8* 23.3* 24.0*   MCHC 29.4* 30.0* 31.0*     BNP:  Recent Labs   Lab 11/23/18  1426 11/23/18  1612 11/26/18 0424   BNP 1,873* 1,204* 1,512*     CMP:  Recent Labs   Lab 11/24/18  0346 11/25/18  0705 11/26/18  0424 11/27/18  0409   GLU 81 132*  --  89   CALCIUM 8.0* 8.2*  --  8.0*   ALBUMIN  --  2.3* 2.0* 2.1*   PROT  --  5.7* 5.2* 5.4*   * 130*  --  133*   K 4.9 4.3  --  3.9   CO2 14* 20*  --  25    104  --  104   BUN 27* 30*  --  20   CREATININE 1.4 1.3  --  0.8   ALKPHOS  --  68 89 83   ALT  --  105* 91* 87*   AST  --  142* 105* 104*   BILITOT  --  1.2* 0.9 1.2*      Coagulation:   Recent Labs   Lab 11/25/18  0248 11/25/18  0850 11/26/18  0424 11/27/18  0409   INR 3.8* 4.0* 3.7* 2.5*   APTT 27.3  --  40.8* 36.8*     LDH:  No results for input(s): LDH in the last 72 hours.  Microbiology:  Microbiology Results (last 7 days)     Procedure Component Value Units Date/Time    Blood culture [[id number]] Collected:  11/24/18 1220    Order Status:  Completed Specimen:  Blood from Peripheral, Wrist, Right Updated:  11/27/18 0950     Blood Culture, Routine Gram stain aer bottle: Gram positive cocci in clusters resembling Staph      Blood Culture, Routine Results called to and read back by: Trinity Forbes RN 11/25/2018  06:35     Blood Culture, Routine --     METHICILLIN RESISTANT STAPHYLOCOCCUS AUREUS  ID consult required at [facility],[place],[facility] and [facility]   locations.  For susceptibility see order #[id number]      Blood culture [[id number]]  (Susceptibility) Collected:  11/24/18 1215    Order Status:   Completed Specimen:  Blood from Peripheral, Forearm, Right Updated:  11/27/18 0950     Blood Culture, Routine Gram stain aer bottle: Gram positive cocci in clusters resembling Staph      Blood Culture, Routine Results called to and read back by: Trinity Forbes RN 11/25/2018  06:35     Blood Culture, Routine --     METHICILLIN RESISTANT STAPHYLOCOCCUS AUREUS  ID consult required at [facility].      Blood culture [[id number]] Collected:  11/27/18 0446    Order Status:  Sent Specimen:  Blood from Peripheral, Antecubital, Left Updated:  11/27/18 0531    Blood culture [[id number]] Collected:  11/27/18 0449    Order Status:  Sent Specimen:  Blood from Peripheral, Antecubital, Right Updated:  11/27/18 0530    Blood culture x two cultures. Draw prior to antibiotics. [[id number]] Collected:  11/23/18 1613    Order Status:  Completed Specimen:  Blood from Peripheral, Antecubital, Left Updated:  11/26/18 1013     Blood Culture, Routine Gram stain clement bottle: Gram positive cocci in clusters resembling Staph     Blood Culture, Routine Results called to and read back by: Sharlene Landon RN 11/24/2018  09:36     Blood Culture, Routine --     METHICILLIN RESISTANT STAPHYLOCOCCUS AUREUS  ID consult required at [facility] and [facility].  For susceptibility see order #[id number]      Narrative:       Aerobic and anaerobic    Blood culture x two cultures. Draw prior to antibiotics. [[id number]]  (Susceptibility) Collected:  11/23/18 1613    Order Status:  Completed Specimen:  Blood from Peripheral, Hand, Left Updated:  11/26/18 1012     Blood Culture, Routine Gram stain clement bottle: Gram positive cocci in clusters resembling Staph     Blood Culture, Routine Results called to and read back by: Sharlene Landon RN 11/24/2018  09:35     Blood Culture, Routine Gram stain aer bottle: Gram positive cocci in clusters resembling Staph     Blood Culture, Routine --     METHICILLIN  RESISTANT STAPHYLOCOCCUS AUREUS  ID consult required at [facility],[place],[facility] and [facility]   locations.      Narrative:       Aerobic and anaerobic    Blood culture [[id number]]     Order Status:  Canceled Specimen:  Blood     Blood culture [[id number]]     Order Status:  Canceled Specimen:  Blood           I have reviewed all pertinent labs within the past 24 hours.    Estimated Creatinine Clearance: 88.4 mL/min (based on SCr of 0.8 mg/dL).    Diagnostic Results:  I have reviewed and interpreted all pertinent imaging results/findings within the past 24 hours.

## 2018-11-28 NOTE — PROGRESS NOTES
" [facility]  Adult Nutrition  Consult Note    SUMMARY       Recommendations    Recommendation/Intervention:   1. Continue current regular diet with Boost Plus. Pt with varying but mostly adequate intake during admit.   2. RD following.    Goals: Intake consistently >50% of meals  Nutrition Goal Status: new  Communication of RD Recs: (POC)    Reason for Assessment    Reason for Assessment: consult  Diagnosis: infection/sepsis  Relevant Medical History: HLD, NICM, s/p ICD, HTN, CHF s/p LVAD, CAD, GIBs  Interdisciplinary Rounds: attended    General Information Comments: Pt reports varying intake/appetite since admit. Did not eat breakfast this am but ate most of dinner last night. Drinking Boost. Reports good appetite at home, no wt loss noted. Appears nourished. Pt reports some issues chewing and did not have dentures in at time of visit, but declined offer for soft diet.    Nutrition Discharge Planning: Adequate PO intake    Nutrition Risk Screen    Nutrition Risk Screen: no indicators present    Nutrition/Diet History    Patient Reported Diet/Restrictions/Preferences: general  Do you have any cultural, spiritual, Hinduism conflicts, given your current situation?: None reported  Supplemental Drinks or Food Habits: Boost Plus  Factors Affecting Nutritional Intake: chewing difficulties/inability to chew food, decreased appetite    Anthropometrics    Temp: (!) 100.6 °F (38.1 °C)  Height Method: Stated  Height: 5' 9" (175.3 cm)  Height (inches): 69 in  Weight Method: Bed Scale  Weight: 76.4 kg (168 lb 6.9 oz)  Weight (lb): 168.43 lb  Ideal Body Weight (IBW), Male: 160 lb  % Ideal Body Weight, Male (lb): 105.27 lb  BMI (Calculated): 24.9  BMI Grade: 18.5-24.9 - normal       Lab/Procedures/Meds    Pertinent Labs Reviewed: reviewed  Pertinent Labs Comments: Na 134, Phos 1.1, Alb 2.2, PAB 6, T.Bili 1.5, AST 91, ALT 84, .4  Pertinent Medications Reviewed: reviewed  Pertinent Medications Comments: " Marinol, Fe, pantoprazole, warfarin    Physical Findings/Assessment    Overall Physical Appearance: nourished  Oral/Mouth Cavity: endentulous, no dental appliances present  Skin: non-healing wound(s)    Estimated/Assessed Needs    Weight Used For Calorie Calculations: 76.4 kg (168 lb 6.9 oz)  Energy Calorie Requirements (kcal): 1905  Energy Need Method: [place]-St Jeor(x 1.25 (PAL))  Protein Requirements: 76-92 gm (1.0-1.2 gm/kg)  Weight Used For Protein Calculations: 76.4 kg (168 lb 6.9 oz)  Fluid Requirements (mL): per MD     RDA Method (mL): 1905       Nutrition Prescription Ordered    Current Diet Order: Regular  Oral Nutrition Supplement: Boost Plus    Evaluation of Received Nutrient/Fluid Intake    Comments: LBM 11/27  % Intake of Estimated Energy Needs: 50 - 75 %  % Meal Intake: 50 - 75 %    Nutrition Risk    Level of Risk/Frequency of Follow-up: low     Assessment and Plan    Nutrition Problem  Chewing difficulty    Related to (etiology):   Edentulous with no dentures present    Signs and Symptoms (as evidenced by):   Pt reported issues chewing some meats, 0% of breakfast eaten this am     Interventions/Recommendations (treatment strategy):  Modified texture diet (if pt agreeable)    Nutrition Diagnosis Status:   New      Monitor and Evaluation    Food and Nutrient Intake: energy intake, food and beverage intake  Food and Nutrient Adminstration: diet order  Physical Activity and Function: nutrition-related ADLs and IADLs  Anthropometric Measurements: weight, weight change, body mass index  Biochemical Data, Medical Tests and Procedures: electrolyte and renal panel, glucose/endocrine profile, gastrointestinal profile, inflammatory profile  Nutrition-Focused Physical Findings: overall appearance     Nutrition Follow-Up    RD Follow-up?: Yes

## 2018-11-28 NOTE — ASSESSMENT & PLAN NOTE
-pt presented with fever, found to have leukocytosis, elevated lactate/CRP and mild INDIRA  -sources potentially: drive line infection (purulent discharge- sampled already and sent to lab; GS showed rare WBCs, CT abdomen showed small volume of fluid inferior aspect of VAD) vs skin infection (scrotal area with boils) vs UTI (pt with frequency, concentrated/dark urine)   -c/w broad spectrum antibiotics GLENN tomorrow   -f/u BCx, lactate is wnl, procalc is elevated.  -ID consulted for rec's on antibiotic selection/management. On IV vanc  -Derm consulted for management of scrotal skin issue. Continue Mucinprocin   -Tylenol PRN (low dose, given elevated LFT's), Ice packs PRN

## 2018-11-28 NOTE — SUBJECTIVE & OBJECTIVE
Interval History: moved to floor, no issues overnight     Continuous Infusions:  Scheduled Meds:   amiodarone  400 mg Oral BID    dronabinol  5 mg Oral BID    ferrous sulfate  325 mg Oral TID    lisinopril  5 mg Oral Daily    mupirocin   Topical (Top) Daily    pantoprazole  40 mg Oral BID    vancomycin (VANCOCIN) IVPB  750 mg Intravenous Q12H    warfarin  2.5 mg Oral Daily     PRN Meds:acetaminophen, polyethylene glycol, polyethylene glycol    Review of patient's allergies indicates:   Allergen Reactions    Asa [aspirin] Other (See Comments)     Bleeding ulcer     Objective:     Vital Signs (Most Recent):  Temp: 99 °F (37.2 °C) (11/28/18 0837)  Pulse: 83 (11/28/18 1111)  Resp: 18 (11/28/18 0837)  BP: (!) 84/0 (11/28/18 0837)  SpO2: 97 % (11/28/18 0837) Vital Signs (24h Range):  Temp:  [99 °F (37.2 °C)-100.2 °F (37.9 °C)] 99 °F (37.2 °C)  Pulse:  [] 83  Resp:  [16-37] 18  SpO2:  [94 %-99 %] 97 %  BP: ()/(0-69) 84/0     Patient Vitals for the past 72 hrs (Last 3 readings):   Weight   11/28/18 0610 76.4 kg (168 lb 6.9 oz)   11/26/18 0600 78.5 kg (173 lb)     Body mass index is 24.87 kg/m².      Intake/Output Summary (Last 24 hours) at 11/28/2018 1135  Last data filed at 11/28/2018 0900  Gross per 24 hour   Intake 555 ml   Output 300 ml   Net 255 ml       Hemodynamic Parameters:  CVP:  [9 mmHg] 9 mmHg        Physical Exam   Constitutional: He is oriented to person, place, and time. No distress.   HENT:   Mouth/Throat: Oropharynx is clear and moist.   Eyes: Pupils are equal, round, and reactive to light.   Neck: Neck supple.   Cardiovascular:   Normal VAD hum    Pulmonary/Chest: Effort normal.   Abdominal: Soft. He exhibits no distension and no mass. There is no tenderness. There is no guarding.   Genitourinary:   Genitourinary Comments: Three boils present on testicles.  2 of which were openly draining   Musculoskeletal: He exhibits no edema.   Neurological: He is alert and oriented to person,  place, and time. No cranial nerve deficit.   Skin: Skin is warm and dry.   Psychiatric: He has a normal mood and affect. His behavior is normal.       Significant Labs:  CBC:  Recent Labs   Lab 11/26/18 0424 11/27/18 0409 11/28/18  0301   WBC 18.52* 15.23* 17.56*  17.56*   RBC 3.52* 3.38* 3.52*  3.52*   HGB 8.2* 8.1* 8.3*  8.3*   HCT 27.3* 26.1* 27.2*  27.2*   * 141* 172  172   MCV 78* 77* 77*  77*   MCH 23.3* 24.0* 23.6*  23.6*   MCHC 30.0* 31.0* 30.5*  30.5*     BNP:  Recent Labs   Lab 11/23/18  1612 11/26/18 0424 11/28/18  0301   BNP 1,204* 1,512* 1,430*  1,430*     CMP:  Recent Labs   Lab 11/25/18  0705 11/26/18 0424 11/27/18 0409 11/28/18  0301   *  --  89 95   CALCIUM 8.2*  --  8.0* 8.1*   ALBUMIN 2.3* 2.0* 2.1* 2.2*  2.2*   PROT 5.7* 5.2* 5.4* 5.7*  5.7*   *  --  133* 134*   K 4.3  --  3.9 4.1   CO2 20*  --  25 24     --  104 104   BUN 30*  --  20 20   CREATININE 1.3  --  0.8 0.9   ALKPHOS 68 89 83 93  93   * 91* 87* 84*  84*   * 105* 104* 91*  91*   BILITOT 1.2* 0.9 1.2* 1.5*  1.5*      Coagulation:   Recent Labs   Lab 11/26/18 0424 11/27/18 0409 11/28/18  0301   INR 3.7* 2.5* 2.5*  2.5*   APTT 40.8* 36.8* 32.4*  32.4*     LDH:  No results for input(s): LDH in the last 72 hours.  Microbiology:  Microbiology Results (last 7 days)     Procedure Component Value Units Date/Time    Blood culture [[id number]] Collected:  11/27/18 0446    Order Status:  Completed Specimen:  Blood from Peripheral, Antecubital, Left Updated:  11/28/18 1053     Blood Culture, Routine Gram stain aer bottle: Gram positive cocci in clusters resembling Staph      Blood Culture, Routine Results called to and read back by: Ethel Miranda RN 11/28/2018  10:52    Narrative:       Draw from 2 different sites at least 30 minutes apart.    Blood culture [[id number]] Collected:  11/27/18 0449    Order Status:  Completed Specimen:  Blood from Peripheral, Antecubital, Right Updated:   11/28/18 0812     Blood Culture, Routine No Growth to date     Blood Culture, Routine No Growth to date    Narrative:       Draw from 2 different sites at least 30 minutes apart.    Blood culture [[id number]] Collected:  11/24/18 1220    Order Status:  Completed Specimen:  Blood from Peripheral, Wrist, Right Updated:  11/27/18 0950     Blood Culture, Routine Gram stain aer bottle: Gram positive cocci in clusters resembling Staph      Blood Culture, Routine Results called to and read back by: Trinity Forbes RN 11/25/2018  06:35     Blood Culture, Routine --     METHICILLIN RESISTANT STAPHYLOCOCCUS AUREUS  ID consult required at [facility] and [facility].  For susceptibility see order #[id number]      Blood culture [[id number]]  (Susceptibility) Collected:  11/24/18 1215    Order Status:  Completed Specimen:  Blood from Peripheral, Forearm, Right Updated:  11/27/18 0950     Blood Culture, Routine Gram stain aer bottle: Gram positive cocci in clusters resembling Staph      Blood Culture, Routine Results called to and read back by: Trinity Forbes RN 11/25/2018  06:35     Blood Culture, Routine --     METHICILLIN RESISTANT STAPHYLOCOCCUS AUREUS  ID consult required at [facility] and [facility]   locations.      Blood culture x two cultures. Draw prior to antibiotics. [[id number]] Collected:  11/23/18 1613    Order Status:  Completed Specimen:  Blood from Peripheral, Antecubital, Left Updated:  11/26/18 1013     Blood Culture, Routine Gram stain clement bottle: Gram positive cocci in clusters resembling Staph     Blood Culture, Routine Results called to and read back by: Sharlene Landon RN 11/24/2018  09:36     Blood Culture, Routine --     METHICILLIN RESISTANT STAPHYLOCOCCUS AUREUS  ID consult required at [facility] and [facility].  For susceptibility see order #[id number]      Narrative:       Aerobic and anaerobic    Blood culture x two cultures. Draw  prior to antibiotics. [[id number]]  (Susceptibility) Collected:  11/23/18 1613    Order Status:  Completed Specimen:  Blood from Peripheral, Hand, Left Updated:  11/26/18 1012     Blood Culture, Routine Gram stain clement bottle: Gram positive cocci in clusters resembling Staph     Blood Culture, Routine Results called to and read back by: Sharlene Landon RN 11/24/2018  09:35     Blood Culture, Routine Gram stain aer bottle: Gram positive cocci in clusters resembling Staph     Blood Culture, Routine --     METHICILLIN RESISTANT STAPHYLOCOCCUS AUREUS  ID consult required at [facility] [place].[facility] and [facility]   locations.      Narrative:       Aerobic and anaerobic    Blood culture [[id number]]     Order Status:  Canceled Specimen:  Blood     Blood culture [[id number]]     Order Status:  Canceled Specimen:  Blood           I have reviewed all pertinent labs within the past 24 hours.    Estimated Creatinine Clearance: 78.6 mL/min (based on SCr of 0.9 mg/dL).    Diagnostic Results:  I have reviewed and interpreted all pertinent imaging results/findings within the past 24 hours.

## 2018-11-28 NOTE — PLAN OF CARE
Problem: Patient Care Overview  Goal: Plan of Care Review  Outcome: Ongoing (interventions implemented as appropriate)  Pt free of falls/injury. Stepped down from ICU tonight. Inventory of VAD equipment performed. Belongings with PT. Wife at bedside.  VSS, CVP will be obtained in am. Paced on monitor. A&O X4. RIJ still intact. DLS clean and no drainage on dressing. Skin assessed. Continued IV Vanc Q12H. Fall risk reviewed with pt and family. Plan of care reviewed with pt and family. Pt in agreement with plan. Will continue to monitor.

## 2018-11-28 NOTE — ASSESSMENT & PLAN NOTE
-HM3 5200 rpm, DT therapy  -Not on home diuretics  -INR theraputic, resume coumadin   -LDH stable    Procedure: Device Interrogation Including analysis of device parameters  Current Settings: Ventricular Assist Device  Review of device function is stable/unstable stable    TXP LVAD INTERROGATIONS 11/28/2018 11/28/2018 11/28/2018 11/27/2018 11/27/2018 11/27/2018 11/27/2018   Type HeartMate3 HeartMate3 HeartMate3 HeartMate3 HeartMate3 HeartMate3 HeartMate3   Flow 4.0 4.2 4.5 4.4 4.3 4.2 4.3   Speed 5200 5200 5200 5200 5200 5200 5200   PI 4.2 3.1 2.5 3.3 3.2 3.2 3.2   Power ([place]) 3.6 3.5 3.5 3.6 3.6 3.6 3.6   LSL 4800 4800 4800 - - - -   Pulsatility Intermittent pulse Intermittent pulse Intermittent pulse Intermittent pulse Intermittent pulse Intermittent pulse Intermittent pulse

## 2018-11-28 NOTE — PROGRESS NOTES
[facility]  Infectious Disease  Progress Note    Patient Name: Suman Hayden  MRN: [id number]  Admission Date: 11/23/2018  Length of Stay: 5 days  Attending Physician: Angie Torres MD  Primary Care Provider: Joe Ernst MD    Isolation Status: Contact  Assessment/Plan:      Bacteremia    68 year old male with PMHx NICM s/p LVAD as DT 7/27/17, hx of staph epi bacteremia treated with 6 weeks of IV vancomycin 1/2018 admitted from LVAD clinic for drive line infection. 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    - CT abdomen performed that showed small volume fluid inferior aspect of LVAD. CRP of 215. Elevated WBC    - LVAD DLES +MRSA  - 2D echo negative for vegetations   -febrile 100.6 today. WBC 17K.       Plan:  -continue vancomycin 750 gax22xd. vanc trough 17. Repeat trough prior to 4th dose. Discussed with ID pharmayc.    -repeat blood cultures 11/27 are positive. Repeat blood cultures today.   -2D echo negative, as repeat culture are positive recommend GLENN to r/o endocarditis as with presence of ICD  -Pt will likely need long term IV abx  -ID will continue to follow.         Thank you for your consult. I will follow-up with patient. Please contact us if you have any additional questions.    Malika Chavarria PA-C  Infectious Disease  [facility]   [phone]    Subjective:     Principal Problem:Severe sepsis    HPI: 68 year old male with PMHx significant for HFrEF secondary to non ischemic cardiomyopathy underwent Heartmate 3 LVAD implanted as DT therapy 7/27/17, (NYHA class II) and VT on amiodarone s/p ICD (with revision in 11/2017 complicated by pocket hematoma, and prior hospitalization for ICD shocks secondary to atrial tachycardia with abberancy). Presents to ED today from clinic for worsening DLES infection (purulent discharge and blood tinged) and fever. Patient seen by VAD nurse and ID staff who felt admission to [facility] with IV antibiotics warranted. Patient also with  fevers, chills, decreased appetite, scrotal boils with spontaneous purulent discharge/rupture, urinary incontinence and confusion at home for the past three days. In the ER, he was found febrile with Tmax of 103 deg F, doppler BP 60-70's and HR 70s. He was given 2 Liters NS IVFs, cefepime/vancomycin and resumed on home meds except aldactone. Blood cultures sent as well as gram stain and culture of specimen from drive line. CXR showed left small pleural effusion and CT abdomen performed that showed small volume fluid inferior aspect of LVAD along with small pericardial effusion. Labs notable for leukocytosis, mild INDIRA, elevated AST/ALT/T.bili, BNP and lactate 2.7 as well as CRP of 215. LDH was 294.  LVAD DLES- showing Staph Aureus.  Blood cxs showing- gram positive coccis.  Pt and wife today reports much improvement since the start of abx.        Interval History:   NAEON  Repeat blood cultures so far negative  GLENN tomorrow  C/o dry cough  No complaints this AM    Review of Systems   Constitutional: Positive for fatigue. Negative for activity change, appetite change, chills, diaphoresis and fever.   HENT: Negative for congestion.    Respiratory: Positive for cough (nonproductive). Negative for chest tightness and shortness of breath.    Cardiovascular: Negative for chest pain.   Gastrointestinal: Negative for abdominal distention, abdominal pain, diarrhea, nausea and vomiting.   Genitourinary: Positive for genital sores. Negative for difficulty urinating.   Musculoskeletal: Negative for arthralgias.   Skin: Positive for wound.   Neurological: Negative for tremors, syncope and speech difficulty.   Psychiatric/Behavioral: Negative for agitation.     Objective:     Vital Signs (Most Recent):  Temp: 99 °F (37.2 °C) (11/28/18 0837)  Pulse: 83 (11/28/18 1111)  Resp: 18 (11/28/18 0837)  BP: (!) 84/0 (11/28/18 0837)  SpO2: 97 % (11/28/18 0837) Vital Signs (24h Range):  Temp:  [99 °F (37.2 °C)-100.2 °F (37.9 °C)] 99 °F (37.2  °C)  Pulse:  [] 83  Resp:  [16-37] 18  SpO2:  [94 %-99 %] 97 %  BP: ()/(0-69) 84/0     Weight: 76.4 kg (168 lb 6.9 oz)  Body mass index is 24.87 kg/m².    Estimated Creatinine Clearance: 78.6 mL/min (based on SCr of 0.9 mg/dL).    Physical Exam   Constitutional: He is oriented to person, place, and time. No distress.   HENT:   Mouth/Throat: Oropharynx is clear and moist.   Eyes: Pupils are equal, round, and reactive to light.   Neck: Neck supple.   Cardiovascular:   Normal VAD hum    Pulmonary/Chest: Effort normal.   Abdominal: Soft. He exhibits no distension and no mass. There is no tenderness. There is no guarding.   Genitourinary:   Genitourinary Comments: Not examined today   Musculoskeletal: He exhibits no edema.   Neurological: He is alert and oriented to person, place, and time. No cranial nerve deficit.   Skin: Skin is warm and dry.   Psychiatric: He has a normal mood and affect. His behavior is normal.       Significant Labs:   Blood Culture:   Recent Labs   Lab 11/23/18  1613 11/24/18  1215 11/24/18  1220 11/27/18  0446 11/27/18  0449   LABBLOO Gram stain clement bottle: Gram positive cocci in clusters resembling Staph  Results called to and read back by: Sharlene Landon RN 11/24/2018  09:36  METHICILLIN RESISTANT STAPHYLOCOCCUS AUREUS  ID consult required at [facility].  For susceptibility see order #[id number]    Gram stain clement bottle: Gram positive cocci in clusters resembling Staph  Results called to and read back by: Sharlene Landon RN 11/24/2018  09:35  Gram stain aer bottle: Gram positive cocci in clusters resembling Staph  METHICILLIN RESISTANT STAPHYLOCOCCUS AUREUS  ID consult required at [facility].   Gram stain aer bottle: Gram positive cocci in clusters resembling Staph   Results called to and read back by: Trinity Forbes RN 11/25/2018  06:35  METHICILLIN RESISTANT STAPHYLOCOCCUS AUREUS  ID  consult required at [facility] and [facility]   locations.   Gram stain aer bottle: Gram positive cocci in clusters resembling Staph   Results called to and read back by: Trinity Forbes RN 11/25/2018  06:35  METHICILLIN RESISTANT STAPHYLOCOCCUS AUREUS  ID consult required at [facility] and [facility].  For susceptibility see order #[id number]   Gram stain aer bottle: Gram positive cocci in clusters resembling Staph   Results called to and read back by: Ethel Miranda RN 11/28/2018  10:52 No Growth to date  No Growth to date     CBC:   Recent Labs   Lab 11/27/18  0409 11/28/18  0301   WBC 15.23* 17.56*  17.56*   HGB 8.1* 8.3*  8.3*   HCT 26.1* 27.2*  27.2*   * 172  172     CMP:   Recent Labs   Lab 11/27/18  0409 11/28/18  0301   * 134*   K 3.9 4.1    104   CO2 25 24   GLU 89 95   BUN 20 20   CREATININE 0.8 0.9   CALCIUM 8.0* 8.1*   PROT 5.4* 5.7*  5.7*   ALBUMIN 2.1* 2.2*  2.2*   BILITOT 1.2* 1.5*  1.5*   ALKPHOS 83 93  93   * 91*  91*   ALT 87* 84*  84*   ANIONGAP 4* 6*   EGFRNONAA >60.0 >60.0     Wound Culture:   Recent Labs   Lab 11/23/18  1448   LABAERO METHICILLIN RESISTANT STAPHYLOCOCCUS AUREUS  Moderate       All pertinent labs within the past 24 hours have been reviewed.    Significant Imaging: I have reviewed all pertinent imaging results/findings within the past 24 hours.

## 2018-11-28 NOTE — PLAN OF CARE
Problem: Patient Care Overview  Goal: Plan of Care Review  Outcome: Ongoing (interventions implemented as appropriate)  Pt is ALATANYA, Ox4. Calm, cooperative. Free of falls, trauma, and injuries. Pt educated on treatment plan. Pt demonstrates and verbalizes understanding. VSS. HM3. Dressing change by wife or RN. MRSA in driveline. Blood Cx positive. On Vanc per MAR. Daily CVP done. Elevated Temp today. Lesions to scrotum treated with Mupirocin ointment. GLENN pending. Plan of care reviewed with pt.

## 2018-11-28 NOTE — SUBJECTIVE & OBJECTIVE
Interval History:   NAEON  Repeat blood cultures so far negative  GLENN tomorrow  C/o dry cough  No complaints this AM    Review of Systems   Constitutional: Positive for fatigue. Negative for activity change, appetite change, chills, diaphoresis and fever.   HENT: Negative for congestion.    Respiratory: Positive for cough (nonproductive). Negative for chest tightness and shortness of breath.    Cardiovascular: Negative for chest pain.   Gastrointestinal: Negative for abdominal distention, abdominal pain, diarrhea, nausea and vomiting.   Genitourinary: Positive for genital sores. Negative for difficulty urinating.   Musculoskeletal: Negative for arthralgias.   Skin: Positive for wound.   Neurological: Negative for tremors, syncope and speech difficulty.   Psychiatric/Behavioral: Negative for agitation.     Objective:     Vital Signs (Most Recent):  Temp: 99 °F (37.2 °C) (11/28/18 0837)  Pulse: 83 (11/28/18 1111)  Resp: 18 (11/28/18 0837)  BP: (!) 84/0 (11/28/18 0837)  SpO2: 97 % (11/28/18 0837) Vital Signs (24h Range):  Temp:  [99 °F (37.2 °C)-100.2 °F (37.9 °C)] 99 °F (37.2 °C)  Pulse:  [] 83  Resp:  [16-37] 18  SpO2:  [94 %-99 %] 97 %  BP: ()/(0-69) 84/0     Weight: 76.4 kg (168 lb 6.9 oz)  Body mass index is 24.87 kg/m².    Estimated Creatinine Clearance: 78.6 mL/min (based on SCr of 0.9 mg/dL).    Physical Exam   Constitutional: He is oriented to person, place, and time. No distress.   HENT:   Mouth/Throat: Oropharynx is clear and moist.   Eyes: Pupils are equal, round, and reactive to light.   Neck: Neck supple.   Cardiovascular:   Normal VAD hum    Pulmonary/Chest: Effort normal.   Abdominal: Soft. He exhibits no distension and no mass. There is no tenderness. There is no guarding.   Genitourinary:   Genitourinary Comments: Not examined today   Musculoskeletal: He exhibits no edema.   Neurological: He is alert and oriented to person, place, and time. No cranial nerve deficit.   Skin: Skin is warm  and dry.   Psychiatric: He has a normal mood and affect. His behavior is normal.       Significant Labs:   Blood Culture:   Recent Labs   Lab 11/23/18  1613 11/24/18  1215 11/24/18  1220 11/27/18  0446 11/27/18  0449   LABBLOO Gram stain clement bottle: Gram positive cocci in clusters resembling Staph  Results called to and read back by: Sharlene Landon RN 11/24/2018  09:36  METHICILLIN RESISTANT STAPHYLOCOCCUS AUREUS  ID consult required at [facility].  For susceptibility see order #[id number]    Gram stain clement bottle: Gram positive cocci in clusters resembling Staph  Results called to and read back by: Sharlene Landon RN 11/24/2018  09:35  Gram stain aer bottle: Gram positive cocci in clusters resembling Staph  METHICILLIN RESISTANT STAPHYLOCOCCUS AUREUS  ID consult required at [facility].   Gram stain aer bottle: Gram positive cocci in clusters resembling Staph   Results called to and read back by: Trinity Forbes RN 11/25/2018  06:35  METHICILLIN RESISTANT STAPHYLOCOCCUS AUREUS  ID consult required at [facility].   Gram stain aer bottle: Gram positive cocci in clusters resembling Staph   Results called to and read back by: Trinity Forbes RN 11/25/2018  06:35  METHICILLIN RESISTANT STAPHYLOCOCCUS AUREUS  ID consult required at [facility].  For susceptibility see order #[id number]   Gram stain aer bottle: Gram positive cocci in clusters resembling Staph   Results called to and read back by: Ethel Miranda RN 11/28/2018  10:52 No Growth to date  No Growth to date     CBC:   Recent Labs   Lab 11/27/18  0409 11/28/18  0301   WBC 15.23* 17.56*  17.56*   HGB 8.1* 8.3*  8.3*   HCT 26.1* 27.2*  27.2*   * 172  172     CMP:   Recent Labs   Lab 11/27/18  0409 11/28/18  0301   * 134*   K 3.9 4.1    104   CO2 25 24   GLU 89 95   BUN  20 20   CREATININE 0.8 0.9   CALCIUM 8.0* 8.1*   PROT 5.4* 5.7*  5.7*   ALBUMIN 2.1* 2.2*  2.2*   BILITOT 1.2* 1.5*  1.5*   ALKPHOS 83 93  93   * 91*  91*   ALT 87* 84*  84*   ANIONGAP 4* 6*   EGFRNONAA >60.0 >60.0     Wound Culture:   Recent Labs   Lab 11/23/18  1448   LABAERO METHICILLIN RESISTANT STAPHYLOCOCCUS AUREUS  Moderate       All pertinent labs within the past 24 hours have been reviewed.    Significant Imaging: I have reviewed all pertinent imaging results/findings within the past 24 hours.

## 2018-11-28 NOTE — PROGRESS NOTES
[facility]  Heart Transplant  Progress Note    Patient Name: Suman Hayden  MRN: [id number]  Admission Date: 11/23/2018  Hospital Length of Stay: 5 days  Attending Physician: Angie Torres MD  Primary Care Provider: Joe Ernst MD  Principal Problem:Severe sepsis    Subjective:     Interval History: moved to floor, no issues overnight     Continuous Infusions:  Scheduled Meds:   amiodarone  400 mg Oral BID    dronabinol  5 mg Oral BID    ferrous sulfate  325 mg Oral TID    lisinopril  5 mg Oral Daily    mupirocin   Topical (Top) Daily    pantoprazole  40 mg Oral BID    vancomycin (VANCOCIN) IVPB  750 mg Intravenous Q12H    warfarin  2.5 mg Oral Daily     PRN Meds:acetaminophen, polyethylene glycol, polyethylene glycol    Review of patient's allergies indicates:   Allergen Reactions    Asa [aspirin] Other (See Comments)     Bleeding ulcer     Objective:     Vital Signs (Most Recent):  Temp: 99 °F (37.2 °C) (11/28/18 0837)  Pulse: 83 (11/28/18 1111)  Resp: 18 (11/28/18 0837)  BP: (!) 84/0 (11/28/18 0837)  SpO2: 97 % (11/28/18 0837) Vital Signs (24h Range):  Temp:  [99 °F (37.2 °C)-100.2 °F (37.9 °C)] 99 °F (37.2 °C)  Pulse:  [] 83  Resp:  [16-37] 18  SpO2:  [94 %-99 %] 97 %  BP: ()/(0-69) 84/0     Patient Vitals for the past 72 hrs (Last 3 readings):   Weight   11/28/18 0610 76.4 kg (168 lb 6.9 oz)   11/26/18 0600 78.5 kg (173 lb)     Body mass index is 24.87 kg/m².      Intake/Output Summary (Last 24 hours) at 11/28/2018 1135  Last data filed at 11/28/2018 0900  Gross per 24 hour   Intake 555 ml   Output 300 ml   Net 255 ml       Hemodynamic Parameters:  CVP:  [9 mmHg] 9 mmHg        Physical Exam   Constitutional: He is oriented to person, place, and time. No distress.   HENT:   Mouth/Throat: Oropharynx is clear and moist.   Eyes: Pupils are equal, round, and reactive to light.   Neck: Neck supple.   Cardiovascular:   Normal VAD hum    Pulmonary/Chest: Effort normal.    Abdominal: Soft. He exhibits no distension and no mass. There is no tenderness. There is no guarding.   Genitourinary:   Genitourinary Comments: Three boils present on testicles.  2 of which were openly draining   Musculoskeletal: He exhibits no edema.   Neurological: He is alert and oriented to person, place, and time. No cranial nerve deficit.   Skin: Skin is warm and dry.   Psychiatric: He has a normal mood and affect. His behavior is normal.       Significant Labs:  CBC:  Recent Labs   Lab 11/26/18 0424 11/27/18 0409 11/28/18  0301   WBC 18.52* 15.23* 17.56*  17.56*   RBC 3.52* 3.38* 3.52*  3.52*   HGB 8.2* 8.1* 8.3*  8.3*   HCT 27.3* 26.1* 27.2*  27.2*   * 141* 172  172   MCV 78* 77* 77*  77*   MCH 23.3* 24.0* 23.6*  23.6*   MCHC 30.0* 31.0* 30.5*  30.5*     BNP:  Recent Labs   Lab 11/23/18  1612 11/26/18 0424 11/28/18  0301   BNP 1,204* 1,512* 1,430*  1,430*     CMP:  Recent Labs   Lab 11/25/18  0705 11/26/18 0424 11/27/18 0409 11/28/18  0301   *  --  89 95   CALCIUM 8.2*  --  8.0* 8.1*   ALBUMIN 2.3* 2.0* 2.1* 2.2*  2.2*   PROT 5.7* 5.2* 5.4* 5.7*  5.7*   *  --  133* 134*   K 4.3  --  3.9 4.1   CO2 20*  --  25 24     --  104 104   BUN 30*  --  20 20   CREATININE 1.3  --  0.8 0.9   ALKPHOS 68 89 83 93  93   * 91* 87* 84*  84*   * 105* 104* 91*  91*   BILITOT 1.2* 0.9 1.2* 1.5*  1.5*      Coagulation:   Recent Labs   Lab 11/26/18 0424 11/27/18  0409 11/28/18  0301   INR 3.7* 2.5* 2.5*  2.5*   APTT 40.8* 36.8* 32.4*  32.4*     LDH:  No results for input(s): LDH in the last 72 hours.  Microbiology:  Microbiology Results (last 7 days)     Procedure Component Value Units Date/Time    Blood culture [[id number]] Collected:  11/27/18 0446    Order Status:  Completed Specimen:  Blood from Peripheral, Antecubital, Left Updated:  11/28/18 1053     Blood Culture, Routine Gram stain aer bottle: Gram positive cocci in clusters resembling Staph      Blood  Culture, Routine Results called to and read back by: Ethel Miranda RN 11/28/2018  10:52    Narrative:       Draw from 2 different sites at least 30 minutes apart.    Blood culture [[id number]] Collected:  11/27/18 0449    Order Status:  Completed Specimen:  Blood from Peripheral, Antecubital, Right Updated:  11/28/18 0812     Blood Culture, Routine No Growth to date     Blood Culture, Routine No Growth to date    Narrative:       Draw from 2 different sites at least 30 minutes apart.    Blood culture [[id number]] Collected:  11/24/18 1220    Order Status:  Completed Specimen:  Blood from Peripheral, Wrist, Right Updated:  11/27/18 0950     Blood Culture, Routine Gram stain aer bottle: Gram positive cocci in clusters resembling Staph      Blood Culture, Routine Results called to and read back by: Trinity Forbes RN 11/25/2018  06:35     Blood Culture, Routine --     METHICILLIN RESISTANT STAPHYLOCOCCUS AUREUS  ID consult required at [facility]   locations.  For susceptibility see order #[id number]      Blood culture [[id number]]  (Susceptibility) Collected:  11/24/18 1215    Order Status:  Completed Specimen:  Blood from Peripheral, Forearm, Right Updated:  11/27/18 0950     Blood Culture, Routine Gram stain aer bottle: Gram positive cocci in clusters resembling Staph      Blood Culture, Routine Results called to and read back by: Trinity Forbes RN 11/25/2018  06:35     Blood Culture, Routine --     METHICILLIN RESISTANT STAPHYLOCOCCUS AUREUS  ID consult required at [facility] and [facility]   locations.      Blood culture x two cultures. Draw prior to antibiotics. [[id number]] Collected:  11/23/18 1613    Order Status:  Completed Specimen:  Blood from Peripheral, Antecubital, Left Updated:  11/26/18 1013     Blood Culture, Routine Gram stain clement bottle: Gram positive cocci in clusters resembling Staph     Blood Culture, Routine Results called to and read back by: Sharlene Landon  RN 11/24/2018  09:36     Blood Culture, Routine --     METHICILLIN RESISTANT STAPHYLOCOCCUS AUREUS  ID consult required at [facility] and [facility].  For susceptibility see order #[id number]      Narrative:       Aerobic and anaerobic    Blood culture x two cultures. Draw prior to antibiotics. [[id number]]  (Susceptibility) Collected:  11/23/18 1613    Order Status:  Completed Specimen:  Blood from Peripheral, Hand, Left Updated:  11/26/18 1012     Blood Culture, Routine Gram stain clement bottle: Gram positive cocci in clusters resembling Staph     Blood Culture, Routine Results called to and read back by: Sharlene Landon RN 11/24/2018  09:35     Blood Culture, Routine Gram stain aer bottle: Gram positive cocci in clusters resembling Staph     Blood Culture, Routine --     METHICILLIN RESISTANT STAPHYLOCOCCUS AUREUS  ID consult required at [facility] and [facility]   locations.      Narrative:       Aerobic and anaerobic    Blood culture [[id number]]     Order Status:  Canceled Specimen:  Blood     Blood culture [[id number]]     Order Status:  Canceled Specimen:  Blood           I have reviewed all pertinent labs within the past 24 hours.    Estimated Creatinine Clearance: 78.6 mL/min (based on SCr of 0.9 mg/dL).    Diagnostic Results:  I have reviewed and interpreted all pertinent imaging results/findings within the past 24 hours.    Assessment and Plan:     68 year old male with PMHx significant for HFrEF secondary to non ischemic cardiomyopathy underwent Heartmate  3 LVAD implanted as DT therapy 7/27/17 and VT on amiodarone s/p ICD (with revision in 11/2017 complicated by pocket hematoma). Presents to ED today from clinic for worsening DLES infection and fever. Patient seen by VAD nurse and ID staff who feel admission to [facility] with IV antibiotics warranted. Patient also with fevers, chills, decreased appetite, scrotal boils with spontaneous purulent discharge/rupture, urinary  incontinence and confusion at home. History provided by patient and supplemented by his wife. He denies recent travel or being around sick contacts. He is complaint with taking his home medications. Of note, he was recently hospitalized for GIB, history of ileal ulcer s/p intervention. Being followed also by EP by Dr. Winchester for possible future VT ablation. Pt denies recent ICD shocks as well as low flow alarms from his VAD.  In the ER, he was found febrile with Tmax of 103 deg F, doppler BP 60-70's and HR 70s. He was given 2 Liters NS IVFs, cefepime/vancomycin and resumed on home meds except aldactone. Blood cultures sent as well as gram stain and culture of specimen from drive line. CXR showed left small pleural effusion and CT abdomen performed that showed small volume fluid inferior aspect of LVAD along with small pericardial effusion. Labs notable for leukocytosis, mild INDIRA, elevated AST/ALT/T.bili, BNP and lactate 2.7 as well as CRP of 215. LDH was 294. VAD was interrogated and noted to have PI events. Pt was admitted to the ICU for further management/care.     * Severe sepsis    -pt presented with fever, found to have leukocytosis, elevated lactate/CRP and mild INDIRA  -sources potentially: drive line infection (purulent discharge- sampled already and sent to lab; GS showed rare WBCs, CT abdomen showed small volume of fluid inferior aspect of VAD) vs skin infection (scrotal area with boils) vs UTI (pt with frequency, concentrated/dark urine)   -c/w broad spectrum antibiotics GLENN tomorrow   -f/u BCx, lactate is wnl, procalc is elevated.  -ID consulted for rec's on antibiotic selection/management. On IV vanc  -Derm consulted for management of scrotal skin issue. Continue Mucinprocin   -Tylenol PRN (low dose, given elevated LFT's), Ice packs PRN      Transaminitis    -likely due to sepsis vs effects of amiodarone  -will trend LFT's   -CT abdomen: liver is normal in size and attenuation without focal hepatic  lesion.  There is gallbladder sludge.  The spleen, adrenal glands, and pancreas are unremarkable.   -holding home pravastatin     Infection, skin    -worsening DLES infection with fever 102  - Send blood culutres  - ID consult, will start broad spectrum abx with vanc and cefepime (given one dose of each in ED already)  -will get imaging with CT to look for further infection, abscess      INDIRA (acute kidney injury)    -mild elevation in serum Cr (1.4, from baseline 1.2)  -holding home aldactone, s/p IVF's (2L NS)   -encourage PO fluid intake, no more IVF's   -bladder scan q6hrs, PRN ISC for PVR >300 cc       Iron deficiency anemia due to chronic blood loss    -c/w home oral iron supplementation tablets     LVAD (left ventricular assist device) present    -HM3 5200 rpm, DT therapy  -Not on home diuretics  -INR theraputic, resume coumadin   -LDH stable    Procedure: Device Interrogation Including analysis of device parameters  Current Settings: Ventricular Assist Device  Review of device function is stable/unstable stable    TXP LVAD INTERROGATIONS 11/28/2018 11/28/2018 11/28/2018 11/27/2018 11/27/2018 11/27/2018 11/27/2018   Type HeartMate3 HeartMate3 HeartMate3 HeartMate3 HeartMate3 HeartMate3 HeartMate3   Flow 4.0 4.2 4.5 4.4 4.3 4.2 4.3   Speed 5200 5200 5200 5200 5200 5200 5200   PI 4.2 3.1 2.5 3.3 3.2 3.2 3.2   Power ([place]) 3.6 3.5 3.5 3.6 3.6 3.6 3.6   LSL 4800 4800 4800 - - - -   Pulsatility Intermittent pulse Intermittent pulse Intermittent pulse Intermittent pulse Intermittent pulse Intermittent pulse Intermittent pulse        Chronic combined systolic and diastolic congestive heart failure    -pt presently initially dry with sepsis, s/p IVF hydration, labs notable for elevated BNP, CXR and CT show left pleural effusion, pt saturating well on 1 Liter oxygen via NC   -c/w home lisinopril, holding home aldactone b/c of elevated serum Cr.     Ventricular tachyarrhythmia    - EP consulted.  - Successfully ATP'd  out of VT  - Continuing Amio gtt.   - Increased ATP burst intervals and lowered detection zone  - Activated tachytherapies as well see EP note.      Essential hypertension    -c/w home lisinopril, currently holding home aldactone  -goal MAP less than 90           MELISSA Long  Heart Transplant  [facility]

## 2018-11-28 NOTE — ASSESSMENT & PLAN NOTE
68 year old male with PMHx NICM s/p LVAD as DT 7/27/17, hx of staph epi bacteremia treated with 6 weeks of IV vancomycin 1/2018 admitted from LVAD clinic for drive line infection. 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    - CT abdomen performed that showed small volume fluid inferior aspect of LVAD. CRP of 215. Elevated WBC    - LVAD DLES +MRSA  - 2D echo negative for vegetations   -febrile 100.6 today. WBC 17K.       Plan:  -continue vancomycin 750 [id number]. vanc trough 17. Repeat trough prior to 4th dose. Discussed with ID pharmayc.    -repeat blood cultures 11/27 are positive. Repeat blood cultures today.   -2D echo negative, as repeat culture are positive recommend GLENN to r/o endocarditis as with presence of ICD  -Pt will likely need long term IV abx  -ID will continue to follow.

## 2018-11-28 NOTE — PROGRESS NOTES
11/28/2018  Jin Ham    Current provider:  Angie Torres MD      I, Jin aHm, rounded on Suman Hayden to ensure all mechanical assist device settings (IABP or VAD) were appropriate and all parameters were within limits.  I was able to ensure all back up equipment was present, the staff had no issues, and the Perfusion Department daily rounding was complete.    12:49 PM

## 2018-11-29 PROBLEM — L08.9 INFECTION, SKIN: Status: RESOLVED | Noted: 2018-01-01 | Resolved: 2018-01-01

## 2018-11-29 PROBLEM — N17.9 AKI (ACUTE KIDNEY INJURY): Status: RESOLVED | Noted: 2018-01-01 | Resolved: 2018-01-01

## 2018-11-29 NOTE — ASSESSMENT & PLAN NOTE
68 year old male with PMHx NICM s/p LVAD as DT 7/27/17, hx of staph epi bacteremia treated with 6 weeks of IV vancomycin 1/2018 admitted from LVAD clinic for drive line infection. 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    - CT abdomen performed that showed small volume fluid inferior aspect of LVAD. CRP of 215. Elevated WBC    - LVAD DLES +MRSA  - 2D echo negative for vegetations   -febrile 103. WBC 18K.       Plan:  -d/c vancomycin, started daptomycin 10mg/kg daily as cultures are still positive. Baseline CPK ordered.   -repeat blood cultures 11/27 are positive. Repeat blood cultures today  -2D echo negative, as repeat culture are positive recommend GLENN to r/o endocarditis as with presence of ICD. GLENN today pending.   -Pt will likely need long term IV abx  -ID will continue to follow.

## 2018-11-29 NOTE — SUBJECTIVE & OBJECTIVE
Interval History: No complaints this am.     Scheduled Meds:   amiodarone  400 mg Oral BID    dronabinol  5 mg Oral BID    ferrous sulfate  325 mg Oral TID    lisinopril  5 mg Oral Daily    mupirocin   Topical (Top) Daily    pantoprazole  40 mg Oral BID    vancomycin (VANCOCIN) IVPB  750 mg Intravenous Q12H    warfarin  2.5 mg Oral Daily     PRN Meds:acetaminophen, polyethylene glycol, polyethylene glycol    Review of patient's allergies indicates:   Allergen Reactions    Asa [aspirin] Other (See Comments)     Bleeding ulcer     Objective:     Vital Signs (Most Recent):  Temp: 99.5 °F (37.5 °C) (11/29/18 0837)  Pulse: 80 (11/29/18 0837)  Resp: 20 (11/29/18 0837)  BP: (!) 88/0 (11/29/18 0837)  SpO2: (!) 94 % (11/29/18 0837) Vital Signs (24h Range):  Temp:  [98.7 °F (37.1 °C)-101 °F (38.3 °C)] 99.5 °F (37.5 °C)  Pulse:  [78-85] 80  Resp:  [18-20] 20  SpO2:  [92 %-99 %] 94 %  BP: ()/(0-62) 88/0     Patient Vitals for the past 72 hrs (Last 3 readings):   Weight   11/29/18 0416 75.7 kg (166 lb 14.2 oz)   11/28/18 0610 76.4 kg (168 lb 6.9 oz)     Body mass index is 24.65 kg/m².      Intake/Output Summary (Last 24 hours) at 11/29/2018 1027  Last data filed at 11/29/2018 0426  Gross per 24 hour   Intake 1570 ml   Output 575 ml   Net 995 ml        Physical Exam   Constitutional: He is oriented to person, place, and time. No distress.   HENT:   Mouth/Throat: Oropharynx is clear and moist.   Eyes: Pupils are equal, round, and reactive to light.   Neck: Neck supple.   Cardiovascular:   Normal VAD hum    Pulmonary/Chest: Effort normal.   Abdominal: Soft. He exhibits no distension and no mass. There is no tenderness. There is no guarding.   Genitourinary:   Genitourinary Comments: Three boils present on testicles.  2 of which were openly draining   Musculoskeletal: He exhibits no edema.   Neurological: He is alert and oriented to person, place, and time. No cranial nerve deficit.   Skin: Skin is warm and dry.    Psychiatric: He has a normal mood and affect. His behavior is normal.     Significant Labs:  CBC:  Recent Labs   Lab 11/27/18 0409 11/28/18 0301 11/29/18 0442   WBC 15.23* 17.56*  17.56* 18.33*  18.33*   RBC 3.38* 3.52*  3.52* 3.55*  3.55*   HGB 8.1* 8.3*  8.3* 8.5*  8.5*   HCT 26.1* 27.2*  27.2* 27.5*  27.5*   * 172  172 242  242   MCV 77* 77*  77* 78*  78*   MCH 24.0* 23.6*  23.6* 23.9*  23.9*   MCHC 31.0* 30.5*  30.5* 30.9*  30.9*     BNP:  Recent Labs   Lab 11/23/18  1612 11/26/18 0424 11/28/18 0301   BNP 1,204* 1,512* 1,430*  1,430*     CMP:  Recent Labs   Lab 11/26/18 0424 11/27/18 0409 11/28/18 0301 11/29/18 0442   GLU  --  89 95 104   CALCIUM  --  8.0* 8.1* 8.2*   ALBUMIN 2.0* 2.1* 2.2*  2.2*  --    PROT 5.2* 5.4* 5.7*  5.7*  --    NA  --  133* 134* 135*   K  --  3.9 4.1 4.1   CO2  --  25 24 25   CL  --  104 104 105   BUN  --  20 20 18   CREATININE  --  0.8 0.9 0.9   ALKPHOS 89 83 93  93  --    ALT 91* 87* 84*  84*  --    * 104* 91*  91*  --    BILITOT 0.9 1.2* 1.5*  1.5*  --       Coagulation:   Recent Labs   Lab 11/27/18 0409 11/28/18 0301 11/29/18 0442   INR 2.5* 2.5*  2.5* 2.6*  2.6*   APTT 36.8* 32.4*  32.4* 33.6*  33.6*     LDH:  No results for input(s): LDH in the last 72 hours.  Microbiology:  Microbiology Results (last 7 days)     Procedure Component Value Units Date/Time    Blood culture [[id number]] Collected:  11/27/18 0449    Order Status:  Completed Specimen:  Blood from Peripheral, Antecubital, Right Updated:  11/29/18 0736     Blood Culture, Routine Gram stain aer bottle: Gram positive cocci in clusters resembling Staph     Blood Culture, Routine Positive results previously called     Blood Culture, Routine --     STAPHYLOCOCCUS AUREUS  ID consult required at [facility] and [facility]   locations.  For susceptibility see order #[id number]      Narrative:       Draw from 2 different sites at least 30 minutes apart.    Blood  culture [[id number]] Collected:  11/27/18 0446    Order Status:  Completed Specimen:  Blood from Peripheral, Antecubital, Left Updated:  11/29/18 0732     Blood Culture, Routine Gram stain aer bottle: Gram positive cocci in clusters resembling Staph      Blood Culture, Routine Results called to and read back by: Ethel Miarnda RN 11/28/2018  10:52     Blood Culture, Routine --     STAPHYLOCOCCUS AUREUS  Susceptibility pending  ID consult required at [facility]   locations.      Narrative:       Draw from 2 different sites at least 30 minutes apart.    Blood culture [[id number]]  (Susceptibility) Collected:  11/24/18 1215    Order Status:  Completed Specimen:  Blood from Peripheral, Forearm, Right Updated:  11/29/18 0727     Blood Culture, Routine Gram stain aer bottle: Gram positive cocci in clusters resembling Staph     Blood Culture, Routine Gram stain clement bottle: Gram positive cocci in clusters resembling Staph      Blood Culture, Routine Results called to and read back by: Trinity Forbes RN 11/25/2018  06:35     Blood Culture, Routine --     METHICILLIN RESISTANT STAPHYLOCOCCUS AUREUS  ID consult required at [facility]   locations.      Blood culture [[id number]] Collected:  11/29/18 0601    Order Status:  Sent Specimen:  Blood Updated:  11/29/18 0622    Blood culture [[id number]] Collected:  11/29/18 0601    Order Status:  Sent Specimen:  Blood Updated:  11/29/18 0622    Blood culture [[id number]] Collected:  11/24/18 1220    Order Status:  Completed Specimen:  Blood from Peripheral, Wrist, Right Updated:  11/27/18 0950     Blood Culture, Routine Gram stain aer bottle: Gram positive cocci in clusters resembling Staph      Blood Culture, Routine Results called to and read back by: Trinity Forbes RN 11/25/2018  06:35     Blood Culture, Routine --     METHICILLIN RESISTANT STAPHYLOCOCCUS AUREUS  ID consult required at [facility].[address]    locations.  For susceptibility see order #[id number]      Blood culture x two cultures. Draw prior to antibiotics. [[id number]] Collected:  11/23/18 1613    Order Status:  Completed Specimen:  Blood from Peripheral, Antecubital, Left Updated:  11/26/18 1013     Blood Culture, Routine Gram stain clement bottle: Gram positive cocci in clusters resembling Staph     Blood Culture, Routine Results called to and read back by: Sharlene Landon RN 11/24/2018  09:36     Blood Culture, Routine --     METHICILLIN RESISTANT STAPHYLOCOCCUS AUREUS  ID consult required at [facility] and [facility].  For susceptibility see order #[id number]      Narrative:       Aerobic and anaerobic    Blood culture x two cultures. Draw prior to antibiotics. [[id number]]  (Susceptibility) Collected:  11/23/18 1613    Order Status:  Completed Specimen:  Blood from Peripheral, Hand, Left Updated:  11/26/18 1012     Blood Culture, Routine Gram stain clement bottle: Gram positive cocci in clusters resembling Staph     Blood Culture, Routine Results called to and read back by: Sharlene Landon RN 11/24/2018  09:35     Blood Culture, Routine Gram stain aer bottle: Gram positive cocci in clusters resembling Staph     Blood Culture, Routine --     METHICILLIN RESISTANT STAPHYLOCOCCUS AUREUS  ID consult required at [facility] and [facility]   locations.      Narrative:       Aerobic and anaerobic    Blood culture [[id number]]     Order Status:  Canceled Specimen:  Blood     Blood culture [[id number]]     Order Status:  Canceled Specimen:  Blood         I have reviewed all pertinent labs within the past 24 hours.    Estimated Creatinine Clearance: 78.6 mL/min (based on SCr of 0.9 mg/dL).    Diagnostic Results:  I have reviewed and interpreted all pertinent imaging results/findings within the past 24 hours.

## 2018-11-29 NOTE — HPI
68 year old male with PMHx significant for HFrEF secondary to non ischemic cardiomyopathy underwent Heartmate  3 LVAD implanted as DT therapy 17 and VT on amiodarone s/p ICD (with revision in 2017 complicated by pocket hematoma). Presents to ED today from clinic for worsening DLES infection and fever, found to be MRSA + and now needing to undergo GLENN to eval for vegetations on ICD lead.     Dysphagia or odynophagia:  No  Liver Disease, esophageal disease, or known varices:  No  Upper GI Bleeding: No  Snoring:  Yes  Sleep Apnea:  No  Prior neck surgery or radiation:  Yes  History of anesthetic difficulties:  No  Family history of anesthetic difficulties:  No  Last oral intake:  12 hours ago  Able to move neck in all directions:  Yes    Mallampati: 2  ASA: 3    INR: 2.6    Imaging:  · LVAD present. Base speed is 5200. The pump type is a Heartmate III.  · The interventricular septum appears midline. The aortic valve opens minimally intermittently.  · Severely decreased left ventricular systolic function. The estimated ejection fraction is 15%  · Moderate right ventricular enlargement.  · Moderately reduced right ventricular systolic function.  · Grade I (mild) left ventricular diastolic dysfunction consistent with impaired relaxation.  · Moderate left atrial enlargement.    GLENN 2018 negative for vegetations    [id number]   diificult to interpret with LVAD

## 2018-11-29 NOTE — CONSULTS
[facility]  Cardiology  Consult Note    Patient Name: Suman Hayden  MRN: [id number]  Admission Date: 2018  Hospital Length of Stay: 6 days  Code Status: Full Code   Attending Provider: Angie Torres MD   Consulting Provider: Casey Newman MD  Primary Care Physician: Joe Ernst MD  Principal Problem:Severe sepsis    Patient information was obtained from patient and past medical records.     Consults  Subjective:     Chief Complaint:  Bacteremia     HPI:   68 year old male with PMHx significant for HFrEF secondary to non ischemic cardiomyopathy underwent Heartmate  3 LVAD implanted as DT therapy 17 and VT on amiodarone s/p ICD (with revision in 2017 complicated by pocket hematoma). Presents to ED today from clinic for worsening DLES infection and fever, found to be MRSA + and now needing to undergo GLENN to eval for vegetations on ICD lead.     Dysphagia or odynophagia:  No  Liver Disease, esophageal disease, or known varices:  No  Upper GI Bleeding: No  Snoring:  Yes  Sleep Apnea:  No  Prior neck surgery or radiation:  Yes  History of anesthetic difficulties:  No  Family history of anesthetic difficulties:  No  Last oral intake:  12 hours ago  Able to move neck in all directions:  Yes    Mallampati: 2  ASA: 3    INR: 2.6    Imaging:  · LVAD present. Base speed is 5200. The pump type is a Heartmate III.  · The interventricular septum appears midline. The aortic valve opens minimally intermittently.  · Severely decreased left ventricular systolic function. The estimated ejection fraction is 15%  · Moderate right ventricular enlargement.  · Moderately reduced right ventricular systolic function.  · Grade I (mild) left ventricular diastolic dysfunction consistent with impaired relaxation.  · Moderate left atrial enlargement.    GLENN 2018 negative for vegetations    [id number]   diificult to interpret with LVAD              Past Medical History:   Diagnosis Date     Arthritis     Cardiomyopathy     CHF (congestive heart failure)     Coronary artery disease     Encounter for blood transfusion     Gastric polyp     Hyperlipidemia     Hypertension     Hypotension, iatrogenic     Iron deficiency anemia due to chronic blood loss 10/15/2018    LVAD (left ventricular assist device) present     Obesity     S/P implantation of automatic cardioverter/defibrillator (AICD)     Ventricular tachycardia        Past Surgical History:   Procedure Laterality Date    ABDOMINAL SURGERY      APPENDECTOMY      CARDIAC CATHETERIZATION      CARDIAC DEFIBRILLATOR PLACEMENT      CARDIAC DEFIBRILLATOR PLACEMENT      CLOSURE-STERNAL WOUND N/A 7/28/2017    Performed by Sunny Downing MD at [facility] OR 2ND FLR    COLONOSCOPY      COLONOSCOPY N/A 3/9/2018    Procedure: COLONOSCOPY;  Surgeon: Andres Chatterjee MD;  Location: [facility] (2ND FLR);  Service: Endoscopy;  Laterality: N/A;    COLONOSCOPY N/A 8/8/2018    Procedure: COLONOSCOPY;  Surgeon: Andres Chatterjee MD;  Location: [facility] (2ND FLR);  Service: Endoscopy;  Laterality: N/A;    COLONOSCOPY N/A 8/8/2018    Performed by Andres Chatterjee MD at [facility] ENDO (2ND FLR)    COLONOSCOPY N/A 3/9/2018    Performed by Andres Chatterjee MD at [facility] (2ND FLR)    DEVICE ASSISTED ENTEROSCOPY-ANTEROGRADE N/A 1/25/2018    Performed by Andres Chatterjee MD at [facility] ENDO (2ND FLR)    DEVICE ASSISTED ENTEROSCOPY-ANTEROGRADE N/A 12/14/2017    Performed by Andres Chatterjee MD at [facility] ENDO (2ND FLR)    EGD (ESOPHAGOGASTRODUODENOSCOPY) N/A 10/26/2018    Performed by Jessica Casillas MD at [facility] ENDO (2ND FLR)    EGD (ESOPHAGOGASTRODUODENOSCOPY) N/A 8/8/2018    Performed by Andres Chatterjee MD at [facility] ENDO (2ND FLR)    ESOPHAGOGASTRODUODENOSCOPY      ESOPHAGOGASTRODUODENOSCOPY N/A 8/8/2018    Procedure: EGD (ESOPHAGOGASTRODUODENOSCOPY);  Surgeon: Andres Chatterjee MD;  Location: [facility] ENDO (2ND FLR);  Service: Endoscopy;  Laterality: N/A;     ESOPHAGOGASTRODUODENOSCOPY N/A 10/26/2018    Procedure: EGD (ESOPHAGOGASTRODUODENOSCOPY);  Surgeon: Jessica Casillas MD;  Location: [facility] (2ND FLR);  Service: Endoscopy;  Laterality: N/A;  Push endoscopy    ESOPHAGOGASTRODUODENOSCOPY (EGD) N/A 3/6/2018    Performed by Andres Chatterjee MD at [facility] ENDO (2ND FLR)    ESOPHAGOGASTRODUODENOSCOPY (EGD) N/A 12/8/2017    Performed by Gagan Barger MD at [facility] ENDO (2ND FLR)    ESOPHAGOGASTRODUODENOSCOPY (EGD) N/A 8/17/2017    Performed by Kishore Mills MD at [facility] (2ND FLR)    EXPLORATORY-LAPAROTOMY with small bowel resection  N/A 3/13/2018    Performed by Kenyon Tellez MD at [facility] OR 2ND FLR    HEART CATH-RIGHT Right 7/3/2017    Performed by Angie Torres MD at [facility] CATH LAB    INSERTION-LEFT VENTRICULAR ASSIST DEVICE N/A 7/27/2017    Performed by Sunny Downing MD at [facility] OR 2ND FLR    INSERTION-LEFT VENTRICULAR ASSIST DEVICE N/A 7/25/2017    Performed by Sunny Downing MD at [facility] OR 2ND FLR    LEFT VENTRICULAR ASSIST DEVICE  07/27/2017    PLACEMENT-NEOPERICARDIUM N/A 7/28/2017    Performed by Sunny Downing MD at [facility] OR 2ND FLR    RESECTION-BOWEL  3/13/2018    Performed by Kenyon Tellez MD at [facility] OR [address] [place] [address]    Retrograde deivce assisted enteroscopy N/A 1/26/2018    Performed by Jesse Davis MD at [facility] (2ND FLR)    REVISION-LEAD-ICD N/A 11/20/2017    Performed by Rubén Winchester MD at [facility] CATH LAB    TONSILLECTOMY      TRANSESOPHAGEAL ECHOCARDIOGRAM (GLENN) N/A 1/9/2018    Performed by [facility] Diagnostic Provider at [facility] CATH LAB       Review of patient's allergies indicates:   Allergen Reactions    Asa [aspirin] Other (See Comments)     Bleeding ulcer       No current facility-administered medications on file prior to encounter.      Current Outpatient Medications on File Prior to Encounter   Medication Sig    amiodarone (PACERONE) 200 MG Tab Take 2 tablets (400 mg total) by mouth once daily.    dronabinol  (MARINOL) 5 MG capsule TAKE ONE CAPSULE BY MOUTH 3 TIMES DAILY BEFORE MEALS    ferrous sulfate 324 mg (65 mg iron) TbEC Take 1 tablet (325 mg total) by mouth 3 (three) times daily.    lisinopril (PRINIVIL,ZESTRIL) 5 MG tablet Take 1 tablet (5 mg total) by mouth once daily.    pantoprazole (PROTONIX) 40 MG tablet Take 1 tablet (40 mg total) by mouth 2 (two) times daily.    pravastatin (PRAVACHOL) 20 MG tablet Take 1 tablet (20 mg total) by mouth every evening.    spironolactone (ALDACTONE) 25 MG tablet Take 1 tablet (25 mg total) by mouth once daily.    warfarin (COUMADIN) 2 MG tablet Take 3mg (1 & 1/2) tabs on 11/2 only, followed by 2mg (1 tab) orally daily thereafter.    mirtazapine (REMERON) 7.5 MG Tab Take 1 tablet (7.5 mg total) by mouth nightly.    polyethylene glycol (GLYCOLAX) 17 gram PwPk Take 17 g by mouth daily as needed (constipation).     Family History     Problem Relation (Age of Onset)    Heart disease Mother, Father        Tobacco Use    Smoking status: Never Smoker    Smokeless tobacco: Never Used   Substance and Sexual Activity    Alcohol use: No    Drug use: No    Sexual activity: Not Currently     Review of Systems   Constitution: Negative.   HENT: Negative.    Eyes: Negative.    Cardiovascular: Negative.    Respiratory: Negative.    Endocrine: Negative.    Skin: Negative.    Musculoskeletal: Negative.    Gastrointestinal: Negative.    Genitourinary: Negative.    Neurological: Negative.      Objective:     Vital Signs (Most Recent):  Temp: (!) 100.7 °F (38.2 °C) (11/29/18 1300)  Pulse: 92 (11/29/18 1214)  Resp: 20 (11/29/18 1136)  BP: (!) 70/0 (11/29/18 1214)  SpO2: (!) 94 % (11/29/18 1136) Vital Signs (24h Range):  Temp:  [98.7 °F (37.1 °C)-103 °F (39.4 °C)] 100.7 °F (38.2 °C)  Pulse:  [78-93] 92  Resp:  [18-20] 20  SpO2:  [92 %-99 %] 94 %  BP: ()/(0-86) 70/0     Weight: 75.7 kg (166 lb 14.2 oz)  Body mass index is 24.65 kg/m².    SpO2: (!) 94 %  O2 Device (Oxygen Therapy):  room air      Intake/Output Summary (Last 24 hours) at 11/29/2018 1353  Last data filed at 11/29/2018 0426  Gross per 24 hour   Intake 1570 ml   Output 575 ml   Net 995 ml       Lines/Drains/Airways     Central Venous Catheter Line                 Percutaneous Central Line Insertion/Assessment - triple lumen  11/25/18 1256 right internal jugular 4 days          Line                 VAD 07/27/17 1312 Left ventricular assist device HeartMate 3 490 days                Physical Exam   Constitutional: He is oriented to person, place, and time. No distress.   HENT:   Head: Atraumatic.   Mouth/Throat: No oropharyngeal exudate.   Eyes: EOM are normal. No scleral icterus.   Neck: Neck supple. No JVD present.   Cardiovascular:   vad hum   Pulmonary/Chest: Effort normal and breath sounds normal. No respiratory distress.   Abdominal: Soft. He exhibits no distension. There is tenderness.   Musculoskeletal: He exhibits no edema.   Neurological: He is alert and oriented to person, place, and time. No cranial nerve deficit.   Skin: Skin is warm and dry. No erythema.   Psychiatric: He has a normal mood and affect.       Significant Labs:   Recent Lab Results       11/29/18  0601   11/29/18  0442   11/28/18  1545        Immature Granulocytes   1.0          1.0       Immature Grans (Abs)   0.18  Comment:  Mild elevation in immature granulocytes is non specific and   can be seen in a variety of conditions including stress response,   acute inflammation, trauma and pregnancy. Correlation with other   laboratory and clinical findings is essential.            0.18  Comment:  Mild elevation in immature granulocytes is non specific and   can be seen in a variety of conditions including stress response,   acute inflammation, trauma and pregnancy. Correlation with other   laboratory and clinical findings is essential.         Anion Gap   5       aPTT   33.6  Comment:  aPTT therapeutic range = 39-69 seconds          33.6  Comment:  aPTT  therapeutic range = 39-69 seconds       Baso #   0.05          0.05       Basophil%   0.3          0.3       Blood Culture, Routine No Growth to date[P]          No Growth to date[P]         BUN, Bld   18       Calcium   8.2       Chloride   105       CO2   25       Creatinine   0.9       Differential Method   Automated          Automated       eGFR if    >60.0       eGFR if non    >60.0  Comment:  Calculation used to obtain the estimated glomerular filtration  rate (eGFR) is the CKD-EPI equation.          Eos #   0.3          0.3       Eosinophil%   1.7          1.7       Glucose   104       Gran # (ANC)   14.9          14.9       Gran%   81.0          81.0       Hematocrit   27.5          27.5       Hemoglobin   8.5          8.5       Coumadin Monitoring INR   2.6  Comment:  Coumadin Therapy:  2.0 - 3.0 for INR for all indicators except mechanical heart valves  and antiphospholipid syndromes which should use 2.5 - 3.5.            2.6  Comment:  Coumadin Therapy:  2.0 - 3.0 for INR for all indicators except mechanical heart valves  and antiphospholipid syndromes which should use 2.5 - 3.5.         Lymph #   1.4          1.4       Lymph%   7.8          7.8       Magnesium   2.2          2.2       MCH   23.9          23.9       MCHC   30.9          30.9       MCV   78          78       Mono #   1.5          1.5       Mono%   8.2          8.2       MPV   12.3          12.3       nRBC   0          0       Phosphorus   1.6          1.6       Platelets   242          242       Potassium   4.1       Protime   25.5          25.5       RBC   3.55          3.55       RDW   19.2          19.2       Sodium   135       Vancomycin-Trough     16.4     WBC   18.33          18.33             Significant Imaging: as per hpi    Assessment and Plan:     Bacteremia    PLAN:  1. GLENN for evaluation of ICD leads and valves for vegetations.    -The risks, benefits & alternatives of the procedure were  explained to the patient.    -The risks of transesophageal echo include but are not limited to:  Dental trauma, esophageal trauma/perforation, bleeding, laryngospasm/brochospasm, aspiration, sore throat/hoarseness, & dislodgement of the endotracheal tube/nasogastric tube (where applicable).    -The risks of moderate sedation include hypotension, respiratory depression, arrhythmias, bronchospasm, & death.    -Informed consent was obtained & the patient is agreeable to proceed with the procedure.    I will discuss with the attending physician. Attending addendum is to follow.     Further recommendations per attending addendum    Casey Newman MD  PGY-V Cardiology Fellow  [phone]         VTE Risk Mitigation (From admission, onward)        Ordered     warfarin (COUMADIN) tablet 2.5 mg  Daily      11/28/18 1002     Place knee high SANDIE hose on both legs  Until discontinued      11/28/18 0009

## 2018-11-29 NOTE — ANESTHESIA PREPROCEDURE EVALUATION
[facility]  Anesthesia Pre-Operative Evaluation         Patient Name: Suman Hayden  YOB: 1950  MRN: [id number]    SUBJECTIVE:     Pre-operative evaluation for Procedure(s) (LRB):  Transesophageal Echocardiogram      11/29/2018    Suman Hayden is a 68 y.o. male w/ a significant PMHx of NICM, S/P Heartmate 3 as DT therapy 7/27/17, ICD revision 11/20/17 with Dr. Vidal (DC ICD placed with active RA/LV leads (RV lead capped during revision) that was complicated by pocket hematoma, GI bleed, VT on Amiodarone, admitted for symptomatic anemia in the setting of acute blood loss anemia due to GI bleed. Hgb 4 on arrival to ED, baseline 9. Last hgb 8.5 on 11/29. WBC elevated to 18.33 on vancomycin.  Received 2 doses of cefepime on 11/23 and 11/24.       Patient now presents for the above procedure(s).    LDA:        Peripheral IV - Single Lumen 10/25/18 0500 Anterior;Right Wrist (Active)   Site Assessment Clean;Dry;Intact;No redness;No swelling 10/25/2018  3:15 PM   Line Status Flushed;Infusing 10/25/2018  3:15 PM   Dressing Change Due 10/29/18 10/25/2018  3:15 PM   Site Change Due 10/29/18 10/25/2018  3:15 PM   Reason Not Rotated Not due 10/25/2018  3:15 PM   Number of days: 0            Midline Catheter Insertion/Assessment  - Single Lumen 10/25/18 (Active)   Number of days: 0            VAD 07/27/17 1312 Left ventricular assist device HeartMate 3 (Active)   Site Location Abdomen right 10/25/2018  3:15 PM   Site Assessment Other (Comment) 10/25/2018  3:15 PM   Driveline Exit Site 1 10/25/2018 10:00 AM   Dressing Status Clean;Dry;Intact 10/25/2018  3:15 PM   Dressing Intervention Dressing reinforced 10/16/2018  7:41 AM   Performed By Family 10/25/2018  3:15 PM   Dressing Change Schedule Every other day 10/25/2018  3:15 PM   Dressing Change Due 10/27/18 10/25/2018  3:15 PM   Driveline Enterprise in use tape 10/25/2018 11:15 AM   Condition CDI 10/24/2018 11:53 PM   Date changed 10/09/18  10/10/2018  8:20 PM   Power Source External DC 10/25/2018  3:15 PM   Equipment inventory at  Admission 10/25/2018  9:50 AM   Pump type HeartMate3 10/25/2018  9:50 AM   Primary Controller [id number] 10/25/2018  9:50 AM   Extra Controller [id number] 10/25/2018  9:50 AM   Battery 1 [id number] 10/25/2018  9:50 AM   Battery 2 [id number] 10/25/2018  9:50 AM   Battery 3 [id number] 10/17/2018  1:01 PM   Battery 4 [id number] 10/17/2018  1:01 PM   Battery 5 [id number] 10/25/2018  9:50 AM   Battery 6 [id number] 10/25/2018  9:50 AM   Battery 7 [id number] 10/17/2018  1:01 PM   Battery 8 [id number] 10/17/2018  1:01 PM   AC Adapter 1 [id number] 8/6/2018  5:00 PM   Battery Charger [id number] 10/25/2018  9:50 AM   Power Module [id number] 10/25/2018  9:50 AM   Mobile Power Unit CSU equipment 9/28/2018 11:08 AM   Battery Clips 4 10/25/2018  9:50 AM   Monitor [id number] 10/25/2018  9:50 AM   Number of days: 455       Prev airway:   Placement Date: 03/13/18; Placement Time: 1256; Method of Intubation: Direct laryngoscopy; Inserted by: Other (CELIO Etienne); Airway Device: Endotracheal Tube; Mask Ventilation: Easy - oral; Intubated: Postinduction; Blade: Francois #2; Airway Device Size: 7.5; Style: Cuffed; Placement Verified By: Auscultation, ETT Condensation, Capnometry; Grade: Grade II; Complicating Factors: None; Intubation Findings: Positive EtCO2, Bilateral breath sounds, Atraumatic/Condition of teeth unchanged;  Depth of Insertion: 22; Securment: Lips; Complications: None; Breath Sounds: Equal Bilateral; Insertion Attempts: 1; Removal Date: 03/13/18;  Removal Time: 1400      Drips: None documented.      Patient Active Problem List   Diagnosis    Nonischemic cardiomyopathy    Long term current use of amiodarone    Essential hypertension    Ventricular tachyarrhythmia    Elevated PSA    Hepatitis B core antibody positive since 2012    Chronic combined systolic and diastolic congestive heart failure    Atrial tachycardia    CHF (congestive heart  failure)    Heart failure    AICD (automatic cardioverter/defibrillator) present    LVAD (left ventricular assist device) present    Heart replaced by heart assist device    Bacteremia    Chronic anticoagulation    Constipation    Normocytic anemia    Anemia    Ileum ulcer    Melena    Pure hypercholesterolemia    Acute blood loss anemia    GI bleed    ICD (implantable cardioverter-defibrillator) discharge    Inappropriate shocks from ICD (implantable cardioverter-defibrillator)    Tingling in extremities    Typical atrial flutter    Acute GI bleeding    Supratherapeutic INR    Examination of participant in clinical trial    Chest pain    Left ventricular assist device (LVAD) complication    Acute on chronic combined systolic and diastolic heart failure    Anticoagulation monitoring, INR range 2-3    Iron deficiency anemia due to chronic blood loss    Severe sepsis    Transaminitis    Scrotum, abscess       Review of patient's allergies indicates:   Allergen Reactions    Asa [aspirin] Other (See Comments)     Bleeding ulcer       Current Inpatient Medications:      Current Facility-Administered Medications on File Prior to Visit   Medication Dose Route Frequency Provider Last Rate Last Dose    acetaminophen tablet 650 mg  650 mg Oral Q6H PRN Stella Thornton MD   650 mg at 11/29/18 1141    amiodarone tablet 400 mg  400 mg Oral BID MELISSA Wilson   400 mg at 11/29/18 0958    dronabinol capsule 5 mg  5 mg Oral BID MELISSA Wilson   5 mg at 11/29/18 0958    ferrous sulfate EC tablet 325 mg  325 mg Oral TID MELISSA Wilson   325 mg at 11/29/18 0957    lisinopril tablet 5 mg  5 mg Oral Daily Stella Thornton MD   5 mg at 11/29/18 0958    mupirocin 2 % ointment   Topical (Top) Daily Rayna Monte MD        pantoprazole EC tablet 40 mg  40 mg Oral BID MELISSA Wilson   40 mg at 11/29/18 0957    polyethylene glycol packet 17 g  17 g Oral Daily  PRN Stella Thornton MD   17 g at 11/26/18 1438    polyethylene glycol packet 17 g  17 g Oral BID PRN MELISSA Wilson        vancomycin 750 mg in dextrose 5 % 250 mL IVPB (ready to mix system)  750 mg Intravenous Q12H Malika Chavarria PA-C 166.7 mL/hr at 11/29/18 0426 750 mg at 11/29/18 0426    warfarin (COUMADIN) tablet 2.5 mg  2.5 mg Oral Daily MELISSA Wilson   2.5 mg at 11/28/18 1720     Current Outpatient Medications on File Prior to Visit   Medication Sig Dispense Refill    amiodarone (PACERONE) 200 MG Tab Take 2 tablets (400 mg total) by mouth once daily. 60 tablet 11    dronabinol (MARINOL) 5 MG capsule TAKE ONE CAPSULE BY MOUTH 3 TIMES DAILY BEFORE MEALS 90 capsule 2    ferrous sulfate 324 mg (65 mg iron) TbEC Take 1 tablet (325 mg total) by mouth 3 (three) times daily. 90 tablet 1    lisinopril (PRINIVIL,ZESTRIL) 5 MG tablet Take 1 tablet (5 mg total) by mouth once daily. 90 tablet 3    mirtazapine (REMERON) 7.5 MG Tab Take 1 tablet (7.5 mg total) by mouth nightly. 30 tablet 11    pantoprazole (PROTONIX) 40 MG tablet Take 1 tablet (40 mg total) by mouth 2 (two) times daily. 60 tablet 11    polyethylene glycol (GLYCOLAX) 17 gram PwPk Take 17 g by mouth daily as needed (constipation). 14 each 0    pravastatin (PRAVACHOL) 20 MG tablet Take 1 tablet (20 mg total) by mouth every evening. 30 tablet 11    spironolactone (ALDACTONE) 25 MG tablet Take 1 tablet (25 mg total) by mouth once daily. 30 tablet 5    warfarin (COUMADIN) 2 MG tablet Take 3mg (1 & 1/2) tabs on 11/2 only, followed by 2mg (1 tab) orally daily thereafter. 30 tablet 11       Past Surgical History:   Procedure Laterality Date    ABDOMINAL SURGERY      APPENDECTOMY      CARDIAC CATHETERIZATION      CARDIAC DEFIBRILLATOR PLACEMENT      CARDIAC DEFIBRILLATOR PLACEMENT      CLOSURE-STERNAL WOUND N/A 7/28/2017    Performed by [facility] Downing [facility] at [facility] OR 2ND FLR    COLONOSCOPY      COLONOSCOPY N/A 3/9/2018     Procedure: COLONOSCOPY;  Surgeon: Andres Chatterjee MD;  Location: [facility] (2ND FLR);  Service: Endoscopy;  Laterality: N/A;    COLONOSCOPY N/A 8/8/2018    Procedure: COLONOSCOPY;  Surgeon: Andres Chatterjee MD;  Location: [facility] (2ND FLR);  Service: Endoscopy;  Laterality: N/A;    COLONOSCOPY N/A 8/8/2018    Performed by Andres Chatterjee MD at [facility] (2ND FLR)    COLONOSCOPY N/A 3/9/2018    Performed by Andres Chatterjee MD at [facility] (2ND FLR)    DEVICE ASSISTED ENTEROSCOPY-ANTEROGRADE N/A 1/25/2018    Performed by Andres Chatterjee MD at [facility] (2ND FLR)    DEVICE ASSISTED ENTEROSCOPY-ANTEROGRADE N/A 12/14/2017    Performed by Andres Chatterjee MD at [facility] (2ND FLR)    EGD (ESOPHAGOGASTRODUODENOSCOPY) N/A 10/26/2018    Performed by Jessica Casillas MD at [facility] (2ND FLR)    EGD (ESOPHAGOGASTRODUODENOSCOPY) N/A 8/8/2018    Performed by Andres Chatterjee MD at [facility] (2ND FLR)    ESOPHAGOGASTRODUODENOSCOPY      ESOPHAGOGASTRODUODENOSCOPY N/A 8/8/2018    Procedure: EGD (ESOPHAGOGASTRODUODENOSCOPY);  Surgeon: Andres Chatterjee MD;  [facility]  Service: Endoscopy;  Laterality: N/A;    ESOPHAGOGASTRODUODENOSCOPY N/A 10/26/2018    Procedure: EGD (ESOPHAGOGASTRODUODENOSCOPY);  Surgeon: Jessica Casillas MD;  Location: [facility] (2ND FLR);  Service: Endoscopy;  Laterality: N/A;  Push endoscopy    ESOPHAGOGASTRODUODENOSCOPY (EGD) N/A 3/6/2018    Performed by Andres Chatterjee MD at [facility] (2ND FLR)    ESOPHAGOGASTRODUODENOSCOPY (EGD) N/A 12/8/2017    Performed by Gagan Barger MD at [facility] (2ND FLR)    ESOPHAGOGASTRODUODENOSCOPY (EGD) N/A 8/17/2017    Performed by Kishore Mills MD at [facility] (2ND FLR)    EXPLORATORY-LAPAROTOMY with small bowel resection  N/A 3/13/2018    Performed by [facility]yon Tellez MD at [facility] OR 2ND FLR    HEART CATH-RIGHT Right 7/3/2017    Performed by Angie Torres MD at [facility] CATH LAB    INSERTION-LEFT VENTRICULAR ASSIST DEVICE N/A  7/27/2017    Performed by Sunny Downing MD at [facility] OR 2ND FLR    INSERTION-LEFT VENTRICULAR ASSIST DEVICE N/A 7/25/2017    Performed by Sunny Downing MD at [facility] OR 2ND FLR    LEFT VENTRICULAR ASSIST DEVICE  07/27/2017    PLACEMENT-NEOPERICARDIUM N/A 7/28/2017    Performed by Sunny Downing MD at [facility] OR 2ND FLR    RESECTION-BOWEL  3/13/2018    Performed by Kenyon Tellez MD at [facility] OR 2ND FLR    Retrograde deivce assisted enteroscopy N/A 1/26/2018    Performed by Jesse Davis MD at [facility] END[facility] (2ND FLR)    REVISION-LEAD-ICD N/A 11/20/2017    Performed by Rubén Winchester MD at [facility] CATH LAB    TONSILLECTOMY      TRANSESOPHAGEAL ECHOCARDIOGRAM (GLENN) N/A 1/9/2018    Performed by [facility] Diagnostic Provider at [facility] CATH LAB       Social History     Socioeconomic History    Marital status:      Spouse name: Not on file    Number of children: Not on file    Years of education: Not on file    Highest education level: Not on file   Social Needs    Financial resource strain: Not on file    Food insecurity - worry: Not on file    Food insecurity - inability: Not on file    Transportation needs - medical: Not on file    Transportation needs - non-medical: Not on file   Occupational History    Not on file   Tobacco Use    Smoking status: Never Smoker    Smokeless tobacco: Never Used   Substance and Sexual Activity    Alcohol use: No    Drug use: No    Sexual activity: Not Currently   Other Topics Concern    Not on file   Social History Narrative    Not on file       OBJECTIVE:     Vital Signs Range (Last 24H):  Temp:  [37.1 °C (98.7 °F)-39.3 °C (102.7 °F)]   Pulse:  [78-85]   Resp:  [18-20]   BP: ()/(0-86)   SpO2:  [92 %-99 %]       Significant Labs:  Lab Results   Component Value Date    WBC 18.33 (H) 11/29/2018    WBC 18.33 (H) 11/29/2018    HGB 8.5 (L) 11/29/2018    HGB 8.5 (L) 11/29/2018    HCT 27.5 (L) 11/29/2018    HCT 27.5 (L) 11/29/2018     11/29/2018      11/29/2018    CHOL 149 07/12/2018    TRIG 62 02/19/2018    HDL 60 02/19/2018    ALT 84 (H) 11/28/2018    ALT 84 (H) 11/28/2018    AST 91 (H) 11/28/2018    AST 91 (H) 11/28/2018     (L) 11/29/2018    K 4.1 11/29/2018     11/29/2018    CREATININE 0.9 11/29/2018    BUN 18 11/29/2018    CO2 25 11/29/2018    TSH 3.346 06/25/2018    PSA 2.8 05/09/2018    INR 2.6 (H) 11/29/2018    INR 2.6 (H) 11/29/2018    HGBA1C 4.7 05/09/2018       Diagnostic Studies: No relevant studies.    EKG:   Vent. Rate : 080 BPM     Atrial Rate : 079 BPM     P-R Int : 000 ms          QRS Dur : 192 ms      QT Int : 572 ms       P-R-T Axes : 000 -77 107 degrees     QTc Int : 659 ms    Technically poor ECG tracing  recommend repeat ECG  Confirmed by CADY KELLEY MD ([id number]) on 10/25/2018 1:08:02 PM    Referred By: [email or web address]   SELF           Confirmed By:CADY KELLEY MD    Vent. Rate : 080 BPM     Atrial Rate : 054 BPM     P-R Int : 000 ms          QRS Dur : 290 ms      QT Int : 424 ms       P-R-T Axes : 000 013 -07 degrees     QTc Int : 489 ms    Baseline Artifact  repeat EKG  Confirmed by REINA KERN MD ([id number]) on 10/17/2018 2:42:11 PM    Referred By: DEANNA RIVERA           Confirmed By:REINA KERN MD    2D ECHO:  Results for orders placed or performed during the hospital encounter of 10/24/18   2D echo with color flow doppler   Result Value Ref Range    QEF 10 (A) 55 - 65   CONCLUSIONS   There is a Heartmate III LVAD in place. Inflow cannula is visualized. Outflow graft is not visualized. Baseline speed is 5200 rpms. The aortic valve does not open. Septum is midline.     1 - Severely depressed left ventricular systolic function (EF 10-15%).     2 - Normal right ventricular systolic function .     3 - Heartmate III LVAD; speed 5200.     4 - Mild left atrial enlargement.     5 - Concentric remodeling.     6 - No wall motion abnormalities.       ASSESSMENT/PLAN:         Pre-op Assessment    I have reviewed the Patient Summary Reports.      I have reviewed the Nursing Notes.   I have reviewed the Medications.     Review of Systems  Anesthesia Hx:  No problems with previous Anesthesia  History of prior surgery of interest to airway management or planning: heart surgery. Previous anesthesia: General  Procedure performed at an [facility] Facility.  Denies Personal Hx of Anesthesia complications.   Social:  Non-Smoker, No Alcohol Use    Hematology/Oncology:     Oncology Normal    -- Anemia:   EENT/Dental:EENT/Dental Normal   Cardiovascular:   Exercise tolerance: poor Pacemaker (AICD, last shock 11/28, hematoma over site) Hypertension CAD   CHF NYHA Classification IV S/p LVAD  Can walk up 1 flight of stairs without SOB   Pulmonary:  Pulmonary Normal    Renal/:  Renal/ Normal     Hepatic/GI:   PUD, GI bleed   Musculoskeletal:  Musculoskeletal Normal    Neurological:  Neurology Normal    Endocrine:  Endocrine Normal    Dermatological:  Skin Normal    Psych:  Psychiatric Normal           Physical Exam  General:  Well nourished    Airway/Jaw/Neck:  Airway Findings: Mouth Opening: Normal Tongue: Normal  General Airway Assessment: Adult  Mallampati: II  Improves to II with phonation.  TM Distance: Normal, at least 6 cm  Jaw/Neck Findings:  Neck ROM: Normal ROM      Dental:  Dental Findings: Edentulous, Upper Dentures, Lower Dentures   Chest/Lungs:  Chest/Lungs Findings: Clear to auscultation, Normal Respiratory Rate     Heart/Vascular:  Heart Findings: Other Heart Findings: LVAD in place     Abdomen:  Abdomen Findings:  Normal, Soft, Nontender     Musculoskeletal:  Musculoskeletal Findings: Normal    Mental Status:  Mental Status Findings:  Cooperative, Alert and Oriented         Anesthesia Plan  Type of Anesthesia, risks & benefits discussed:  Anesthesia Type:  general, MAC  Patient's Preference:   Intra-op Monitoring Plan: standard ASA monitors and arterial line  Intra-op Monitoring Plan Comments:   Post Op Pain Control Plan: multimodal analgesia, IV/PO  Opioids PRN and per primary service following discharge from PACU  Post Op Pain Control Plan Comments:   Induction:   IV  Beta Blocker:  Patient is not currently on a Beta-Blocker (No further documentation required).       Informed Consent: Patient representative understands risks and agrees with Anesthesia plan.  Questions answered. Anesthesia consent signed with patient representative.  ASA Score: 4     Day of Surgery Review of History & Physical:            Ready For Surgery From Anesthesia Perspective.

## 2018-11-29 NOTE — PLAN OF CARE
Problem: Patient Care Overview  Goal: Plan of Care Review  Outcome: Ongoing (interventions implemented as appropriate)  Pt is LATANYA PELAEZ Ox4. Calm, cooperative. Free of falls, trauma, and injuries. DLES dressing done per wife. Lesions to scrotum treated with mupirocin ointment. GLENN pending.Blood Cx pending. Tylenol given for 102.7 temp. Vanco per MAR. Contact isolation maintained for MRSA in DLES. Pt educated on treatment plan. Pt demonstrates and verbalizes understanding. VSS. Plan of care reviewed with pt.

## 2018-11-29 NOTE — ASSESSMENT & PLAN NOTE
-HM3 5200 rpm, DT.  -Not on home diuretics.  -INR therapeutic, continue coumadin.  -ASA stopped on 12/22/17 after GI bleed and ileal ulcer   -LDH stable  -BP wnl.    Procedure: Device Interrogation Including analysis of device parameters  Current Settings: Ventricular Assist Device  Review of device function is stable/unstable stable    TXP LVAD INTERROGATIONS 11/29/2018 11/29/2018 11/29/2018 11/28/2018 11/28/2018 11/28/2018 11/28/2018   Type HeartMate3 HeartMate3 HeartMate3 HeartMate3 HeartMate3 HeartMate3 HeartMate3   Flow 4.0 4.0 4.4 4.1 4.3  4.2 4.0   Speed 5200 5200 5200 5200 5200 5200 5200   PI 4.0 3.2 3.1 3.3 3.3 3.3 4.2   Power ([place]) 3.6 3.6 3.6 3.6 3.6 3.5 3.6   LSL 4800 4800 4800 4800 - - 4800   Pulsatility Intermittent pulse - - - - - Intermittent pulse

## 2018-11-29 NOTE — ASSESSMENT & PLAN NOTE
PLAN:  1. GLENN for evaluation of ICD leads and valves for vegetations.    -The risks, benefits & alternatives of the procedure were explained to the patient.    -The risks of transesophageal echo include but are not limited to:  Dental trauma, esophageal trauma/perforation, bleeding, laryngospasm/brochospasm, aspiration, sore throat/hoarseness, & dislodgement of the endotracheal tube/nasogastric tube (where applicable).    -The risks of moderate sedation include hypotension, respiratory depression, arrhythmias, bronchospasm, & death.    -Informed consent was obtained & the patient is agreeable to proceed with the procedure.    I will discuss with the attending physician. Attending addendum is to follow.     Further recommendations per attending addendum    Casey Newman MD  PGY-V Cardiology Fellow  [phone]

## 2018-11-29 NOTE — PROGRESS NOTES
[facility]  Infectious Disease  Progress Note    Patient Name: Suman Hayden  MRN: [id number]  Admission Date: 11/23/2018  Length of Stay: 6 days  Attending Physician: Angie Torres MD  Primary Care Provider: Joe Ernst MD    Isolation Status: Contact  Assessment/Plan:      Bacteremia    68 year old male with PMHx NICM s/p LVAD as DT 7/27/17, hx of staph epi bacteremia treated with 6 weeks of IV vancomycin 1/2018 admitted from LVAD clinic for drive line infection. 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    - CT abdomen performed that showed small volume fluid inferior aspect of LVAD. CRP of 215. Elevated WBC    - LVAD DLES +MRSA  - 2D echo negative for vegetations   -febrile 103. WBC 18K.       Plan:  -d/c vancomycin, started daptomycin 10mg/kg daily as cultures are still positive. Baseline CPK ordered.   -repeat blood cultures 11/27 are positive. Repeat blood cultures today  -2D echo negative, as repeat culture are positive recommend GLENN to r/o endocarditis as with presence of ICD. GLENN today pending.   -Pt will likely need long term IV abx  -ID will continue to follow.         Thank you for your consult. I will follow-up with patient. Please contact us if you have any additional questions.    Malika Chavarria PA-C  Infectious Disease  [facility]  [phone]    Subjective:     Principal Problem:Severe sepsis    HPI: 68 year old male with PMHx significant for HFrEF secondary to non ischemic cardiomyopathy underwent Heartmate 3 LVAD implanted as DT therapy 7/27/17, (NYHA class II) and VT on amiodarone s/p ICD (with revision in 11/2017 complicated by pocket hematoma, and prior hospitalization for ICD shocks secondary to atrial tachycardia with abberancy). Presents to ED today from clinic for worsening DLES infection (purulent discharge and blood tinged) and fever. Patient seen by VAD nurse and ID staff who felt admission to [facility] with IV antibiotics warranted. Patient also with  fevers, chills, decreased appetite, scrotal boils with spontaneous purulent discharge/rupture, urinary incontinence and confusion at home for the past three days. In the ER, he was found febrile with Tmax of 103 deg F, doppler BP 60-70's and HR 70s. He was given 2 Liters NS IVFs, cefepime/vancomycin and resumed on home meds except aldactone. Blood cultures sent as well as gram stain and culture of specimen from drive line. CXR showed left small pleural effusion and CT abdomen performed that showed small volume fluid inferior aspect of LVAD along with small pericardial effusion. Labs notable for leukocytosis, mild INDIRA, elevated AST/ALT/T.bili, BNP and lactate 2.7 as well as CRP of 215. LDH was 294.  LVAD DLES- showing Staph Aureus.  Blood cxs showing- gram positive coccis.  Pt and wife today reports much improvement since the start of abx.        Interval History:   Chills on examination  Repeat cultures are positive  Fevers yesterday and today  GLENN pending.  No abdominal pain, pt with cough. No production. No n/v/d,dysuria.     Review of Systems   Constitutional: Positive for chills, fatigue and fever. Negative for activity change, appetite change and diaphoresis.   HENT: Negative for congestion.    Respiratory: Positive for cough (nonproductive). Negative for chest tightness and shortness of breath.    Cardiovascular: Negative for chest pain.   Gastrointestinal: Negative for abdominal distention, abdominal pain, diarrhea, nausea and vomiting.   Genitourinary: Negative for difficulty urinating and genital sores.   Musculoskeletal: Negative for arthralgias.   Skin: Positive for wound.   Neurological: Negative for tremors, syncope and speech difficulty.   Psychiatric/Behavioral: Negative for agitation.     Objective:     Vital Signs (Most Recent):  Temp: (!) 100.7 °F (38.2 °C) (11/29/18 1300)  Pulse: 92 (11/29/18 1214)  Resp: 20 (11/29/18 1136)  BP: (!) 70/0 (11/29/18 1214)  SpO2: (!) 94 % (11/29/18 1136) Vital Signs  (24h Range):  Temp:  [98.7 °F (37.1 °C)-103 °F (39.4 °C)] 100.7 °F (38.2 °C)  Pulse:  [78-93] 92  Resp:  [18-20] 20  SpO2:  [92 %-99 %] 94 %  BP: ()/(0-86) 70/0     Weight: 75.7 kg (166 lb 14.2 oz)  Body mass index is 24.65 kg/m².    Estimated Creatinine Clearance: 78.6 mL/min (based on SCr of 0.9 mg/dL).    Physical Exam   Constitutional: He is oriented to person, place, and time. He appears well-developed and well-nourished. No distress.   Chills on examination   HENT:   Mouth/Throat: Oropharynx is clear and moist.   Eyes: Pupils are equal, round, and reactive to light.   Neck: Neck supple.   Cardiovascular:   Normal VAD hum    Pulmonary/Chest: Effort normal.   Abdominal: Soft. He exhibits no distension and no mass. There is no tenderness. There is no guarding.   Genitourinary:   Genitourinary Comments: Not examined today   Musculoskeletal: He exhibits no edema.   Neurological: He is alert and oriented to person, place, and time. No cranial nerve deficit.   Skin: Skin is warm and dry.   Psychiatric: He has a normal mood and affect. His behavior is normal.       Significant Labs:   Blood Culture:   Recent Labs   Lab 11/23/18  1613 11/24/18  1215 11/24/18  1220 11/27/18  0446 11/27/18  0449   LABBLOO Gram stain clement bottle: Gram positive cocci in clusters resembling Staph  Results called to and read back by: Sharlene Landon RN 11/24/2018  09:36  METHICILLIN RESISTANT STAPHYLOCOCCUS AUREUS  ID consult required at [facility] and [facility].  For susceptibility see order #[id number]    Gram stain clement bottle: Gram positive cocci in clusters resembling Staph  Results called to and read back by: Sharlene Landon RN 11/24/2018  09:35  Gram stain aer bottle: Gram positive cocci in clusters resembling Staph  METHICILLIN RESISTANT STAPHYLOCOCCUS AUREUS  ID consult required at [facility] and [facility].   Gram stain aer bottle: Gram positive cocci in clusters  resembling Staph  Gram stain clement bottle: Gram positive cocci in clusters resembling Staph   Results called to and read back by: Trinity Forbes RN 11/25/2018  06:35  METHICILLIN RESISTANT STAPHYLOCOCCUS AUREUS  ID consult required at [facility].   Gram stain aer bottle: Gram positive cocci in clusters resembling Staph   Results called to and read back by: Trinity Forbes RN 11/25/2018  06:35  METHICILLIN RESISTANT STAPHYLOCOCCUS AUREUS  ID consult required at [facility].  For susceptibility see order #[id number]   Gram stain aer bottle: Gram positive cocci in clusters resembling Staph   Results called to and read back by: Ethel Miranda RN 11/28/2018  10:52  STAPHYLOCOCCUS AUREUS  Susceptibility pending  ID consult required at [facility].   Gram stain aer bottle: Gram positive cocci in clusters resembling Staph  Positive results previously called  STAPHYLOCOCCUS AUREUS  ID consult required at [facility].  For susceptibility see order #[id number]       CBC:   Recent Labs   Lab 11/28/18  0301 11/29/18  0442   WBC 17.56*  17.56* 18.33*  18.33*   HGB 8.3*  8.3* 8.5*  8.5*   HCT 27.2*  27.2* 27.5*  27.5*     172 242  242     CMP:   Recent Labs   Lab 11/28/18  0301 11/29/18  0442   * 135*   K 4.1 4.1    105   CO2 24 25   GLU 95 104   BUN 20 18   CREATININE 0.9 0.9   CALCIUM 8.1* 8.2*   PROT 5.7*  5.7*  --    ALBUMIN 2.2*  2.2*  --    BILITOT 1.5*  1.5*  --    ALKPHOS 93  93  --    AST 91*  91*  --    ALT 84*  84*  --    ANIONGAP 6* 5*   EGFRNONAA >60.0 >60.0     Wound Culture:   Recent Labs   Lab 11/23/18  1448   LABAERO METHICILLIN RESISTANT STAPHYLOCOCCUS AUREUS  Moderate       All pertinent labs within the past 24 hours have been reviewed.    Significant Imaging: I have reviewed all pertinent imaging  results/findings within the past 24 hours.

## 2018-11-29 NOTE — SUBJECTIVE & OBJECTIVE
Interval History:   Chills on examination  Repeat cultures are positive  Fevers yesterday and today  GLENN pending.  No abdominal pain, pt with cough. No production. No n/v/d,dysuria.     Review of Systems   Constitutional: Positive for chills, fatigue and fever. Negative for activity change, appetite change and diaphoresis.   HENT: Negative for congestion.    Respiratory: Positive for cough (nonproductive). Negative for chest tightness and shortness of breath.    Cardiovascular: Negative for chest pain.   Gastrointestinal: Negative for abdominal distention, abdominal pain, diarrhea, nausea and vomiting.   Genitourinary: Negative for difficulty urinating and genital sores.   Musculoskeletal: Negative for arthralgias.   Skin: Positive for wound.   Neurological: Negative for tremors, syncope and speech difficulty.   Psychiatric/Behavioral: Negative for agitation.     Objective:     Vital Signs (Most Recent):  Temp: (!) 100.7 °F (38.2 °C) (11/29/18 1300)  Pulse: 92 (11/29/18 1214)  Resp: 20 (11/29/18 1136)  BP: (!) 70/0 (11/29/18 1214)  SpO2: (!) 94 % (11/29/18 1136) Vital Signs (24h Range):  Temp:  [98.7 °F (37.1 °C)-103 °F (39.4 °C)] 100.7 °F (38.2 °C)  Pulse:  [78-93] 92  Resp:  [18-20] 20  SpO2:  [92 %-99 %] 94 %  BP: ()/(0-86) 70/0     Weight: 75.7 kg (166 lb 14.2 oz)  Body mass index is 24.65 kg/m².    Estimated Creatinine Clearance: 78.6 mL/min (based on SCr of 0.9 mg/dL).    Physical Exam   Constitutional: He is oriented to person, place, and time. He appears well-developed and well-nourished. No distress.   Chills on examination   HENT:   Mouth/Throat: Oropharynx is clear and moist.   Eyes: Pupils are equal, round, and reactive to light.   Neck: Neck supple.   Cardiovascular:   Normal VAD hum    Pulmonary/Chest: Effort normal.   Abdominal: Soft. He exhibits no distension and no mass. There is no tenderness. There is no guarding.   Genitourinary:   Genitourinary Comments: Not examined today    Musculoskeletal: He exhibits no edema.   Neurological: He is alert and oriented to person, place, and time. No cranial nerve deficit.   Skin: Skin is warm and dry.   Psychiatric: He has a normal mood and affect. His behavior is normal.       Significant Labs:   Blood Culture:   Recent Labs   Lab 11/23/18  1613 11/24/18  1215 11/24/18  1220 11/27/18  0446 11/27/18  0449   LABBLOO Gram stain clement bottle: Gram positive cocci in clusters resembling Staph  Results called to and read back by: Sharlene Landon RN 11/24/2018  09:36  METHICILLIN RESISTANT STAPHYLOCOCCUS AUREUS  ID consult required at [facility] and [facility].  For susceptibility see order #[id number]    Gram stain clement bottle: Gram positive cocci in clusters resembling Staph  Results called to and read back by: Sharlene Landon RN 11/24/2018  09:35  Gram stain aer bottle: Gram positive cocci in clusters resembling Staph  METHICILLIN RESISTANT STAPHYLOCOCCUS AUREUS  ID consult required at [facility].   Gram stain aer bottle: Gram positive cocci in clusters resembling Staph  Gram stain clement bottle: Gram positive cocci in clusters resembling Staph   Results called to and read back by: Trinity Forbes RN 11/25/2018  06:35  METHICILLIN RESISTANT STAPHYLOCOCCUS AUREUS  ID consult required at [facility] and [facility].   Gram stain aer bottle: Gram positive cocci in clusters resembling Staph   Results called to and read back by: Trinity Forbes RN 11/25/2018  06:35  METHICILLIN RESISTANT STAPHYLOCOCCUS AUREUS  ID consult required at [facility] and [facility].  For susceptibility see order #[id number]   Gram stain aer bottle: Gram positive cocci in clusters resembling Staph   Results called to and read back by: Ethel Miranda RN 11/28/2018  10:52  STAPHYLOCOCCUS AUREUS  Susceptibility pending  ID consult required [facility]   locations.   Gram stain aer bottle: Gram positive cocci in clusters resembling Staph  Positive results previously called  STAPHYLOCOCCUS AUREUS  [facility].  For susceptibility see order #[id number]       CBC:   Recent Labs   Lab 11/28/18  0301 11/29/18  0442   WBC 17.56*  17.56* 18.33*  18.33*   HGB 8.3*  8.3* 8.5*  8.5*   HCT 27.2*  27.2* 27.5*  27.5*     172 242  242     CMP:   Recent Labs   Lab 11/28/18  0301 11/29/18  0442   * 135*   K 4.1 4.1    105   CO2 24 25   GLU 95 104   BUN 20 18   CREATININE 0.9 0.9   CALCIUM 8.1* 8.2*   PROT 5.7*  5.7*  --    ALBUMIN 2.2*  2.2*  --    BILITOT 1.5*  1.5*  --    ALKPHOS 93  93  --    AST 91*  91*  --    ALT 84*  84*  --    ANIONGAP 6* 5*   EGFRNONAA >60.0 >60.0     Wound Culture:   Recent Labs   Lab 11/23/18  1448   LABAERO METHICILLIN RESISTANT STAPHYLOCOCCUS AUREUS  Moderate       All pertinent labs within the past 24 hours have been reviewed.    Significant Imaging: I have reviewed all pertinent imaging results/findings within the past 24 hours.

## 2018-11-29 NOTE — TRANSFER OF CARE
"Anesthesia Transfer of Care Note    Patient: Suman Hayden    Procedure(s) Performed: Procedure(s) (LRB):  ECHOCARDIOGRAM,TRANSESOPHAGEAL (N/A)    Patient location: Telemetry/Step Down Unit    Anesthesia Type: MAC    Transport from OR: Transported from OR on 2-3 L/min O2 by NC with adequate spontaneous ventilation    Post pain: adequate analgesia    Post assessment: no apparent anesthetic complications and tolerated procedure well    Post vital signs: stable    Level of consciousness: awake, alert and oriented    Nausea/Vomiting: no nausea/vomiting    Complications: none    Transfer of care protocol was followed      Last vitals:   Visit Vitals  BP (!) 70/0   Pulse 92   Temp (!) 38.2 °C (100.7 °F) (Oral)   Resp 20   Ht 5' 9" (1.753 m)   Wt 75.7 kg (166 lb 14.2 oz)   SpO2 (!) 94%   BMI 24.65 kg/m²     "

## 2018-11-29 NOTE — PROGRESS NOTES
11/29/2018  Baljinder Jones    Current provider:  Angie Torres MD      I, Baljinder Jones, rounded on Suman Hayden to ensure all mechanical assist device settings (IABP or VAD) were appropriate and all parameters were within limits.  I was able to ensure all back up equipment was present, the staff had no issues, and the Perfusion Department daily rounding was complete.    10:37 AM

## 2018-11-29 NOTE — PLAN OF CARE
Problem: Patient Care Overview  Goal: Plan of Care Review  Outcome: Revised  Plan of care discussed with patient and spouse. Patient is free of fall/trauma/injury. Denies CP, SOB, or pain/discomfort. Tylenol given for temp 101.0 and decreased to 99.5. Wife at bedside and very active in care including doing pt's LVAD dressing change daily. Last INR-2.5. Monitor showing Conduction. 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  All questions addressed. Will continue to monitor

## 2018-11-29 NOTE — SUBJECTIVE & OBJECTIVE
Past Medical History:   Diagnosis Date    Arthritis     Cardiomyopathy     CHF (congestive heart failure)     Coronary artery disease     Encounter for blood transfusion     Gastric polyp     Hyperlipidemia     Hypertension     Hypotension, iatrogenic     Iron deficiency anemia due to chronic blood loss 10/15/2018    LVAD (left ventricular assist device) present     Obesity     S/P implantation of automatic cardioverter/defibrillator (AICD)     Ventricular tachycardia        Past Surgical History:   Procedure Laterality Date    ABDOMINAL SURGERY      APPENDECTOMY      CARDIAC CATHETERIZATION      CARDIAC DEFIBRILLATOR PLACEMENT      CARDIAC DEFIBRILLATOR PLACEMENT      CLOSURE-STERNAL WOUND N/A 7/28/2017    Performed by Sunny Downing MD at [facility] OR 2ND FLR    COLONOSCOPY      COLONOSCOPY N/A 3/9/2018    Procedure: COLONOSCOPY;  Surgeon: Andres Chatterjee MD;  Location: [facility] (2ND FLR);  Service: Endoscopy;  Laterality: N/A;    COLONOSCOPY N/A 8/8/2018    Procedure: COLONOSCOPY;  Surgeon: Andres Chatterjee MD;  Location: [facility] (2ND FLR);  Service: Endoscopy;  Laterality: N/A;    COLONOSCOPY N/A 8/8/2018    Performed by Andres Chatterjee MD at [facility] ENDO (2ND FLR)    COLONOSCOPY N/A 3/9/2018    Performed by Andres Chatterjee MD at [facility] (2ND FLR)    DEVICE ASSISTED ENTEROSCOPY-ANTEROGRADE N/A 1/25/2018    Performed by Andres Chatterjee MD at [facility] ENDO (2ND FLR)    DEVICE ASSISTED ENTEROSCOPY-ANTEROGRADE N/A 12/14/2017    Performed by Andres Chatterjee MD at [facility] (2ND FLR)    EGD (ESOPHAGOGASTRODUODENOSCOPY) N/A 10/26/2018    Performed by Jessica Casillas MD at [facility] ENDO (2ND FLR)    EGD (ESOPHAGOGASTRODUODENOSCOPY) N/A 8/8/2018    Performed by Andres Chatterjee MD at [facility] (2ND FLR)    ESOPHAGOGASTRODUODENOSCOPY      ESOPHAGOGASTRODUODENOSCOPY N/A 8/8/2018    Procedure: EGD (ESOPHAGOGASTRODUODENOSCOPY);  Surgeon: Andres Chatterjee MD;  Location: [facility] (2ND FLR);  Service:  Endoscopy;  Laterality: N/A;    ESOPHAGOGASTRODUODENOSCOPY N/A 10/26/2018    Procedure: EGD (ESOPHAGOGASTRODUODENOSCOPY);  Surgeon: Jessica Casillas MD;  Location: [facility] (2ND FLR);  Service: Endoscopy;  Laterality: N/A;  Push endoscopy    ESOPHAGOGASTRODUODENOSCOPY (EGD) N/A 3/6/2018    Performed by Andres Chatterjee MD at [facility] ENDO (2ND FLR)    ESOPHAGOGASTRODUODENOSCOPY (EGD) N/A 12/8/2017    Performed by Gagan Barger MD at [facility] ENDO (2ND FLR)    ESOPHAGOGASTRODUODENOSCOPY (EGD) N/A 8/17/2017    Performed by Kishore Mills MD at [facility] (2ND FLR)    EXPLORATORY-LAPAROTOMY with small bowel resection  N/A 3/13/2018    Performed by Kenyon Tellez MD at [facility] OR 2ND FLR    HEART CATH-RIGHT Right 7/3/2017    Performed by Angie Torres MD at [facility] CATH LAB    INSERTION-LEFT VENTRICULAR ASSIST DEVICE N/A 7/27/2017    Performed by Sunny Downing MD at [facility] OR 2ND FLR    INSERTION-LEFT VENTRICULAR ASSIST DEVICE N/A 7/25/2017    Performed by Sunny Downing MD at [facility] OR 2ND FLR    LEFT VENTRICULAR ASSIST DEVICE  07/27/2017    PLACEMENT-NEOPERICARDIUM N/A 7/28/2017    Performed by Sunny Downing MD at [facility] OR 2ND FLR    RESECTION-BOWEL  3/13/2018    [facility]    Retrograde deivce assisted enteroscopy N/A 1/26/2018    Performed by Jesse Davis MD at [facility] (2ND FLR)    REVISION-LEAD-ICD N/A 11/20/2017    Performed by Rubén Winchester MD at [facility] CATH LAB    TONSILLECTOMY      TRANSESOPHAGEAL ECHOCARDIOGRAM (GLENN) N/A 1/9/2018    Performed by [facility] Diagnostic Provider at [facility] CATH LAB       Review of patient's allergies indicates:   Allergen Reactions    Asa [aspirin] Other (See Comments)     Bleeding ulcer       No current facility-administered medications on file prior to encounter.      Current Outpatient Medications on File Prior to Encounter   Medication Sig    amiodarone (PACERONE) 200 MG Tab Take 2 tablets (400 mg total) by mouth  once daily.    dronabinol (MARINOL) 5 MG capsule TAKE ONE CAPSULE BY MOUTH 3 TIMES DAILY BEFORE MEALS    ferrous sulfate 324 mg (65 mg iron) TbEC Take 1 tablet (325 mg total) by mouth 3 (three) times daily.    lisinopril (PRINIVIL,ZESTRIL) 5 MG tablet Take 1 tablet (5 mg total) by mouth once daily.    pantoprazole (PROTONIX) 40 MG tablet Take 1 tablet (40 mg total) by mouth 2 (two) times daily.    pravastatin (PRAVACHOL) 20 MG tablet Take 1 tablet (20 mg total) by mouth every evening.    spironolactone (ALDACTONE) 25 MG tablet Take 1 tablet (25 mg total) by mouth once daily.    warfarin (COUMADIN) 2 MG tablet Take 3mg (1 & 1/2) tabs on 11/2 only, followed by 2mg (1 tab) orally daily thereafter.    mirtazapine (REMERON) 7.5 MG Tab Take 1 tablet (7.5 mg total) by mouth nightly.    polyethylene glycol (GLYCOLAX) 17 gram PwPk Take 17 g by mouth daily as needed (constipation).     Family History     Problem Relation (Age of Onset)    Heart disease Mother, Father        Tobacco Use    Smoking status: Never Smoker    Smokeless tobacco: Never Used   Substance and Sexual Activity    Alcohol use: No    Drug use: No    Sexual activity: Not Currently     Review of Systems   Constitution: Negative.   HENT: Negative.    Eyes: Negative.    Cardiovascular: Negative.    Respiratory: Negative.    Endocrine: Negative.    Skin: Negative.    Musculoskeletal: Negative.    Gastrointestinal: Negative.    Genitourinary: Negative.    Neurological: Negative.      Objective:     Vital Signs (Most Recent):  Temp: (!) 100.7 °F (38.2 °C) (11/29/18 1300)  Pulse: 92 (11/29/18 1214)  Resp: 20 (11/29/18 1136)  BP: (!) 70/0 (11/29/18 1214)  SpO2: (!) 94 % (11/29/18 1136) Vital Signs (24h Range):  Temp:  [98.7 °F (37.1 °C)-103 °F (39.4 °C)] 100.7 °F (38.2 °C)  Pulse:  [78-93] 92  Resp:  [18-20] 20  SpO2:  [92 %-99 %] 94 %  BP: ()/(0-86) 70/0     Weight: 75.7 kg (166 lb 14.2 oz)  Body mass index is 24.65 kg/m².    SpO2: (!) 94 %  O2  Device (Oxygen Therapy): room air      Intake/Output Summary (Last 24 hours) at 11/29/2018 1353  Last data filed at 11/29/2018 0426  Gross per 24 hour   Intake 1570 ml   Output 575 ml   Net 995 ml       Lines/Drains/Airways     Central Venous Catheter Line                 Percutaneous Central Line Insertion/Assessment - triple lumen  11/25/18 1256 right internal jugular 4 days          Line                 VAD 07/27/17 1312 Left ventricular assist device HeartMate 3 490 days                Physical Exam   Constitutional: He is oriented to person, place, and time. No distress.   HENT:   Head: Atraumatic.   Mouth/Throat: No oropharyngeal exudate.   Eyes: EOM are normal. No scleral icterus.   Neck: Neck supple. No JVD present.   Cardiovascular:   vad hum   Pulmonary/Chest: Effort normal and breath sounds normal. No respiratory distress.   Abdominal: Soft. He exhibits no distension. There is tenderness.   Musculoskeletal: He exhibits no edema.   Neurological: He is alert and oriented to person, place, and time. No cranial nerve deficit.   Skin: Skin is warm and dry. No erythema.   Psychiatric: He has a normal mood and affect.       Significant Labs:   Recent Lab Results       11/29/18  0601   11/29/18  0442   11/28/18  1545        Immature Granulocytes   1.0          1.0       Immature Grans (Abs)   0.18  Comment:  Mild elevation in immature granulocytes is non specific and   can be seen in a variety of conditions including stress response,   acute inflammation, trauma and pregnancy. Correlation with other   laboratory and clinical findings is essential.            0.18  Comment:  Mild elevation in immature granulocytes is non specific and   can be seen in a variety of conditions including stress response,   acute inflammation, trauma and pregnancy. Correlation with other   laboratory and clinical findings is essential.         Anion Gap   5       aPTT   33.6  Comment:  aPTT therapeutic range = 39-69 seconds           33.6  Comment:  aPTT therapeutic range = 39-69 seconds       Baso #   0.05          0.05       Basophil%   0.3          0.3       Blood Culture, Routine No Growth to date[P]          No Growth to date[P]         BUN, Bld   18       Calcium   8.2       Chloride   105       CO2   25       Creatinine   0.9       Differential Method   Automated          Automated       eGFR if    >60.0       eGFR if non    >60.0  Comment:  Calculation used to obtain the estimated glomerular filtration  rate (eGFR) is the CKD-EPI equation.          Eos #   0.3          0.3       Eosinophil%   1.7          1.7       Glucose   104       Gran # (ANC)   14.9          14.9       Gran%   81.0          81.0       Hematocrit   27.5          27.5       Hemoglobin   8.5          8.5       Coumadin Monitoring INR   2.6  Comment:  Coumadin Therapy:  2.0 - 3.0 for INR for all indicators except mechanical heart valves  and antiphospholipid syndromes which should use 2.5 - 3.5.            2.6  Comment:  Coumadin Therapy:  2.0 - 3.0 for INR for all indicators except mechanical heart valves  and antiphospholipid syndromes which should use 2.5 - 3.5.         Lymph #   1.4          1.4       Lymph%   7.8          7.8       Magnesium   2.2          2.2       MCH   23.9          23.9       MCHC   30.9          30.9       MCV   78          78       Mono #   1.5          1.5       Mono%   8.2          8.2       MPV   12.3          12.3       nRBC   0          0       Phosphorus   1.6          1.6       Platelets   242          242       Potassium   4.1       Protime   25.5          25.5       RBC   3.55          3.55       RDW   19.2          19.2       Sodium   135       Vancomycin-Trough     16.4     WBC   18.33          18.33             Significant Imaging: as per hpi

## 2018-11-29 NOTE — ASSESSMENT & PLAN NOTE
- EP consulted. Successfully ATP'd out of VT. Increased ATP burst intervals and lowered detection zone. Activated tachytherapies as well see EP note.   -Transitioned to PO amio.

## 2018-11-29 NOTE — PROGRESS NOTES
[facility]  Heart Transplant  Progress Note    Patient Name: Suman Hayden  MRN: [id number]  Admission Date: 11/23/2018  Hospital Length of Stay: 6 days  Attending Physician: Angie Torres MD  Primary Care Provider: Joe Ernst MD  Principal Problem:Severe sepsis    Subjective:     Interval History: No complaints this am.     Scheduled Meds:   amiodarone  400 mg Oral BID    dronabinol  5 mg Oral BID    ferrous sulfate  325 mg Oral TID    lisinopril  5 mg Oral Daily    mupirocin   Topical (Top) Daily    pantoprazole  40 mg Oral BID    vancomycin (VANCOCIN) IVPB  750 mg Intravenous Q12H    warfarin  2.5 mg Oral Daily     PRN Meds:acetaminophen, polyethylene glycol, polyethylene glycol    Review of patient's allergies indicates:   Allergen Reactions    Asa [aspirin] Other (See Comments)     Bleeding ulcer     Objective:     Vital Signs (Most Recent):  Temp: 99.5 °F (37.5 °C) (11/29/18 0837)  Pulse: 80 (11/29/18 0837)  Resp: 20 (11/29/18 0837)  BP: (!) 88/0 (11/29/18 0837)  SpO2: (!) 94 % (11/29/18 0837) Vital Signs (24h Range):  Temp:  [98.7 °F (37.1 °C)-101 °F (38.3 °C)] 99.5 °F (37.5 °C)  Pulse:  [78-85] 80  Resp:  [18-20] 20  SpO2:  [92 %-99 %] 94 %  BP: ()/(0-62) 88/0     Patient Vitals for the past 72 hrs (Last 3 readings):   Weight   11/29/18 0416 75.7 kg (166 lb 14.2 oz)   11/28/18 0610 76.4 kg (168 lb 6.9 oz)     Body mass index is 24.65 kg/m².      Intake/Output Summary (Last 24 hours) at 11/29/2018 1027  Last data filed at 11/29/2018 0426  Gross per 24 hour   Intake 1570 ml   Output 575 ml   Net 995 ml        Physical Exam   Constitutional: He is oriented to person, place, and time. No distress.   HENT:   Mouth/Throat: Oropharynx is clear and moist.   Eyes: Pupils are equal, round, and reactive to light.   Neck: Neck supple.   Cardiovascular:   Normal VAD hum    Pulmonary/Chest: Effort normal.   Abdominal: Soft. He exhibits no distension and no mass. There is no  tenderness. There is no guarding.   Genitourinary:   Genitourinary Comments: Three boils present on testicles.  2 of which were openly draining   Musculoskeletal: He exhibits no edema.   Neurological: He is alert and oriented to person, place, and time. No cranial nerve deficit.   Skin: Skin is warm and dry.   Psychiatric: He has a normal mood and affect. His behavior is normal.     Significant Labs:  CBC:  Recent Labs   Lab 11/27/18 0409 11/28/18  0301 11/29/18 0442   WBC 15.23* 17.56*  17.56* 18.33*  18.33*   RBC 3.38* 3.52*  3.52* 3.55*  3.55*   HGB 8.1* 8.3*  8.3* 8.5*  8.5*   HCT 26.1* 27.2*  27.2* 27.5*  27.5*   * 172  172 242  242   MCV 77* 77*  77* 78*  78*   MCH 24.0* 23.6*  23.6* 23.9*  23.9*   MCHC 31.0* 30.5*  30.5* 30.9*  30.9*     BNP:  Recent Labs   Lab 11/23/18  1612 11/26/18  0424 11/28/18  0301   BNP 1,204* 1,512* 1,430*  1,430*     CMP:  Recent Labs   Lab 11/26/18 0424 11/27/18 0409 11/28/18  0301 11/29/18 0442   GLU  --  89 95 104   CALCIUM  --  8.0* 8.1* 8.2*   ALBUMIN 2.0* 2.1* 2.2*  2.2*  --    PROT 5.2* 5.4* 5.7*  5.7*  --    NA  --  133* 134* 135*   K  --  3.9 4.1 4.1   CO2  --  25 24 25   CL  --  104 104 105   BUN  --  20 20 18   CREATININE  --  0.8 0.9 0.9   ALKPHOS 89 83 93  93  --    ALT 91* 87* 84*  84*  --    * 104* 91*  91*  --    BILITOT 0.9 1.2* 1.5*  1.5*  --       Coagulation:   Recent Labs   Lab 11/27/18 0409 11/28/18  0301 11/29/18  0442   INR 2.5* 2.5*  2.5* 2.6*  2.6*   APTT 36.8* 32.4*  32.4* 33.6*  33.6*     LDH:  No results for input(s): LDH in the last 72 hours.  Microbiology:  Microbiology Results (last 7 days)     Procedure Component Value Units Date/Time    Blood culture [[id number]] Collected:  11/27/18 0449    Order Status:  Completed Specimen:  Blood from Peripheral, Antecubital, Right Updated:  11/29/18 0736     Blood Culture, Routine Gram stain aer bottle: Gram positive cocci in clusters resembling Staph     Blood  Culture, Routine Positive results previously called     Blood Culture, Routine --     STAPHYLOCOCCUS AUREUS  ID consult required at [facility] and [facility].  For susceptibility see order #[id number]      Narrative:       Draw from 2 different sites at least 30 minutes apart.    Blood culture [[id number]] Collected:  11/27/18 0446    Order Status:  Completed Specimen:  Blood from Peripheral, Antecubital, Left Updated:  11/29/18 0732     Blood Culture, Routine Gram stain aer bottle: Gram positive cocci in clusters resembling Staph      Blood Culture, Routine Results called to and read back by: Ethel Miranda RN 11/28/2018  10:52     Blood Culture, Routine --     STAPHYLOCOCCUS AUREUS  Susceptibility pending  ID consult required at [facility] and [facility].      Narrative:       Draw from 2 different sites at least 30 minutes apart.    Blood culture [[id number]]  (Susceptibility) Collected:  11/24/18 1215    Order Status:  Completed Specimen:  Blood from Peripheral, Forearm, Right Updated:  11/29/18 0727     Blood Culture, Routine Gram stain aer bottle: Gram positive cocci in clusters resembling Staph     Blood Culture, Routine Gram stain clement bottle: Gram positive cocci in clusters resembling Staph      Blood Culture, Routine Results called to and read back by: Trinity Forbes RN 11/25/2018  06:35     Blood Culture, Routine --     METHICILLIN RESISTANT STAPHYLOCOCCUS AUREUS  ID consult required at [facility] and [facility]   locations.      Blood culture [[id number]] Collected:  11/29/18 0601    Order Status:  Sent Specimen:  Blood Updated:  11/29/18 0622    Blood culture [[id number]] Collected:  11/29/18 0601    Order Status:  Sent Specimen:  Blood Updated:  11/29/18 0622    Blood culture [[id number]] Collected:  11/24/18 1220    Order Status:  Completed Specimen:  Blood from Peripheral, Wrist, Right Updated:  11/27/18 0950     Blood Culture, Routine  Gram stain aer bottle: Gram positive cocci in clusters resembling Staph      Blood Culture, Routine Results called to and read back by: Trinity Forbes RN 11/25/2018  06:35     Blood Culture, Routine --     METHICILLIN RESISTANT STAPHYLOCOCCUS AUREUS  ID consult required at [facility].  For susceptibility see order #[id number]      Blood culture x two cultures. Draw prior to antibiotics. [[id number]] Collected:  11/23/18 1613    Order Status:  Completed Specimen:  Blood from Peripheral, Antecubital, Left Updated:  11/26/18 1013     Blood Culture, Routine Gram stain clement bottle: Gram positive cocci in clusters resembling Staph     Blood Culture, Routine Results called to and read back by: Sharlene Landon RN 11/24/2018  09:36     Blood Culture, Routine --     METHICILLIN RESISTANT STAPHYLOCOCCUS AUREUS  ID consult required at [facility].  For susceptibility see order #[id number]      Narrative:       Aerobic and anaerobic    Blood culture x two cultures. Draw prior to antibiotics. [[id number]]  (Susceptibility) Collected:  11/23/18 1613    Order Status:  Completed Specimen:  Blood from Peripheral, Hand, Left Updated:  11/26/18 1012     Blood Culture, Routine Gram stain clement bottle: Gram positive cocci in clusters resembling Staph     Blood Culture, Routine Results called to and read back by: Sharlene Landon RN 11/24/2018  09:35     Blood Culture, Routine Gram stain aer bottle: Gram positive cocci in clusters resembling Staph     Blood Culture, Routine --     METHICILLIN RESISTANT STAPHYLOCOCCUS AUREUS  ID consult required at [facility].      Narrative:       Aerobic and anaerobic    Blood culture [[id number]]     Order Status:  Canceled Specimen:  Blood     Blood culture [[id number]]     Order Status:  Canceled Specimen:  Blood         I have reviewed all pertinent labs within the past 24  hours.    Estimated Creatinine Clearance: 78.6 mL/min (based on SCr of 0.9 mg/dL).    Diagnostic Results:  I have reviewed and interpreted all pertinent imaging results/findings within the past 24 hours.    Assessment and Plan:     68 year old male with PMHx significant for HFrEF secondary to non ischemic cardiomyopathy underwent Heartmate  3 LVAD implanted as DT therapy 7/27/17 and VT on amiodarone s/p ICD (with revision in 11/2017 complicated by pocket hematoma). Presents to ED today from clinic for worsening DLES infection and fever. Patient seen by VAD nurse and ID staff who feel admission to [facility] with IV antibiotics warranted. Patient also with fevers, chills, decreased appetite, scrotal boils with spontaneous purulent discharge/rupture, urinary incontinence and confusion at home. History provided by patient and supplemented by his wife. He denies recent travel or being around sick contacts. He is complaint with taking his home medications. Of note, he was recently hospitalized for GIB, history of ileal ulcer s/p intervention. Being followed also by EP by Dr. Winchester for possible future VT ablation. Pt denies recent ICD shocks as well as low flow alarms from his VAD.  In the ER, he was found febrile with Tmax of 103 deg F, doppler BP 60-70's and HR 70s. He was given 2 Liters NS IVFs, cefepime/vancomycin and resumed on home meds except aldactone. Blood cultures sent as well as gram stain and culture of specimen from drive line. CXR showed left small pleural effusion and CT abdomen performed that showed small volume fluid inferior aspect of LVAD along with small pericardial effusion. Labs notable for leukocytosis, mild INDIRA, elevated AST/ALT/T.bili, BNP and lactate 2.7 as well as CRP of 215. LDH was 294. VAD was interrogated and noted to have PI events. Pt was admitted to the ICU for further management/care.     * Severe sepsis    -Presented with fever, found to have leukocytosis, elevated lactate/CRP and mild  INDIRA.  -sources potentially: drive line infection (purulent discharge- sampled already and sent to lab; GS showed rare WBCs, CT abdomen showed small volume of fluid inferior aspect of VAD) vs skin infection (scrotal area with boils) vs UTI (pt with frequency, concentrated/dark urine).  -Appreciate IdCx. c/w broad spectrum antibiotics. continue vancomycin   -repeat blood cultures 11/27 are positive. Repeat blood cultures 11/28.  -Plan for GLENN today.  -Derm consulted for management of scrotal skin issue. Continue Mucinprocin      Ventricular tachyarrhythmia    - EP consulted. Successfully ATP'd out of VT. Increased ATP burst intervals and lowered detection zone. Activated tachytherapies as well see EP note.   -Transitioned to PO amio.      LVAD (left ventricular assist device) present    -HM3 5200 rpm, DT.  -Not on home diuretics.  -INR therapeutic, continue coumadin.  -ASA stopped on 12/22/17 after GI bleed and ileal ulcer   -LDH stable  -BP wnl.    Procedure: Device Interrogation Including analysis of device parameters  Current Settings: Ventricular Assist Device  Review of device function is stable/unstable stable    TXP LVAD INTERROGATIONS 11/29/2018 11/29/2018 11/29/2018 11/28/2018 11/28/2018 11/28/2018 11/28/2018   Type HeartMate3 HeartMate3 HeartMate3 HeartMate3 HeartMate3 HeartMate3 HeartMate3   Flow 4.0 4.0 4.4 4.1 4.3  4.2 4.0   Speed 5200 5200 5200 5200 5200 5200 5200   PI 4.0 3.2 3.1 3.3 3.3 3.3 4.2   Power ([place]) 3.6 3.6 3.6 3.6 3.6 3.5 3.6   LSL 4800 4800 4800 4800 - - 4800   Pulsatility Intermittent pulse - - - - - Intermittent pulse        Transaminitis    -likely due to sepsis vs effects of amiodarone  -will trend LFT's   -CT abdomen: liver is normal in size and attenuation without focal hepatic lesion.  There is gallbladder sludge.  The spleen, adrenal glands, and pancreas are unremarkable.   -holding home pravastatin     Iron deficiency anemia due to chronic blood loss    -c/w home oral iron  supplementation tablets     Chronic combined systolic and diastolic congestive heart failure    -c/w home lisinopril, holding home aldactone.     Essential hypertension    -c/w home lisinopril, currently holding home aldactone  -goal MAP less than 90           Kishore Paz NP  Heart Transplant  [facility]

## 2018-11-30 PROBLEM — I10 ESSENTIAL HYPERTENSION: Status: RESOLVED | Noted: 2017-07-05 | Resolved: 2018-01-01

## 2018-11-30 NOTE — SIGNIFICANT EVENT
Pt with worsening renal function and minimal UO throughout the day. Pt AAOX3 and with no current complaints. ScVO2 35 and Lactate rising as well. BP has been on low side as well. Will move pt to ICU and start Norepi for BP support. Will get CT chest/Abd/Plevis per ID recs. Will need to be careful with pressor support as pt has frequent VT. Awaiting CVP.

## 2018-11-30 NOTE — ASSESSMENT & PLAN NOTE
-HM3 5200 rpm, DT.  -Not on home diuretics. CVP 8.   -INR therapeutic, continue coumadin.  -ASA stopped on 12/22/17 after GI bleed and ileal ulcer   -LDH stable  -BP wnl.    Procedure: Device Interrogation Including analysis of device parameters  Current Settings: Ventricular Assist Device  Review of device function is stable/unstable stable    TXP LVAD INTERROGATIONS 11/30/2018 11/30/2018 11/29/2018 11/29/2018 11/29/2018 11/29/2018 11/29/2018   Type HeartMate3 HeartMate3 HeartMate3 HeartMate3 HeartMate3 HeartMate3 HeartMate3   Flow 3.7 4.0 4.3 4.0 3.6 4.1 4.0   Speed 5200 5200 5200 5200 5200 5200 5200   PI 7.6 7.6 3.9 7.2 7.9 2.8 4.0   Power ([place]) 3.7 3.6 3.5 3.6 3.6 3.6 3.6   LSL 4800 4800 4800 4800 - - 4800   Pulsatility - - - Intermittent pulse - - Intermittent pulse

## 2018-11-30 NOTE — PROGRESS NOTES
Admit Note     Met with patient and wife on 11/27 to assess needs. Patient is a 68 y.o.  male, admitted for sepsis per medical record.  Pt has an LVAD, implanted in 7/2017.    Patient admitted from ED on 11/23/2018.  At this time, patient presents as alert and oriented x 4, calm and cooperative.  At this time, patients caregiver presents as alert and oriented x 4, calm and cooperative.    Household/Family Systems     Patient resides with patient's wife and adult son Boni, at    [address] [address]    Pt home: [phone]  Kia Hayden (wife): [phone]  Boni Hayden (son, lives with pt): [phone]  Rayna Frazier (daughter): [phone]  Malcolm Frazier (son-in-law): [phone]  Artie Hayden (nephew): [phone]    Support system includes wife, adult children, grandchildren, extended family, and [place] community.  Patient does not have dependents that are need of being cared for.     Patients primary caregiver is Kia Hayden, patients wife.  During admission, patient's caregiver plans to stay in patient's room.  Confirmed patient and patients caregivers do have access to reliable transportation.    Cognitive Status/Learning     Patient reports reading ability as 8th grade and states patient does not have difficulty with reading, writing, seeing, hearing, comprehension, learning and memory.  Patient reports patient learns best by observation and with support from wife.     Needed: No.   Highest education level: Grade School (0-8)    Vocation/Disability     Working for Income: No  If no, reason not working: retired   Patient retired in 2000 from the [employer].  Pt was a .    Pt's wife worked at [employer] and as a PCA until pt became ill.  Pt's wife reports she now receives SSI.    Adherence     Patient reports a high level of adherence to patients health care regimen.  Adherence counseling and education provided. Patient verbalizes  understanding.    Substance Use    Patient reports the following substance usage.    Tobacco: none, patient denies any use.  Alcohol: none, patient denies any use.  Illicit Drugs/Non-prescribed Medications: none, patient denies any use.  Patient states clear understanding of the potential impact of substance use.  Substance abstinence/cessation counseling, education and resources provided and reviewed.     Services Utilizing/ADLS    Infusion Service: Prior to admission, patient utilizing? no.  Pt has used [facility] (ph: [phone], f: [phone]) for home infusion in the past.  Pt requesting to use [facility] again if infusion services are needed at d/c.  Home Health: Prior to admission, patient utilizing? no.  Pt has used [facility] in the past.  Pt requesting to use [facility] again if HH services are needed at d/c.  DME: Prior to admission, yes walker, wheelchair, and bedside commode.  Pt reports he does not currently use DME at home.   Pulmonary/Cardiac Rehab: Prior to admission, no  Dialysis:  Prior to admission, no  Transplant Specialty Pharmacy:  Prior to admission, n/a    Prior to admission, patient reports patient was mostly independent with ADLs and was not driving.  Patient reports patient is able to care for self at this time.  Patient indicates a willingness to care for self once medically cleared to do so.    Insurance/Medications    Insured by   Payor/Plan Subscr  Sex Relation Sub. Ins. ID Effective Group Num   1. MEDICARE - MEMIRTA SAMUELS 1950 Male  [id number] 6/1/15                                    [address]   2. BLUE CROSS * MIRTA LOPEZ 1950 Male  [id number] 1/1/10 [id number]                                   [address] [address]      Primary Insurance (for UNOS reporting): Public Insurance - Medicare FFS (Fee For Service)  Secondary Insurance (for UNOS reporting): Private Insurance     Patient reports patient is able to obtain and afford medications at this time and at time of  discharge.    Living Will/Healthcare Power of     Patient states patient does not have a LW and/or HCPA.   provided education regarding LW and HCPA and the completion of forms.    Coping/Mental Health    Patient is coping adequately with the aid of  family members and kayli.   Patient denies mental health difficulties.      Discharge Planning    At time of discharge, patient plans to return to patient's home under the care of wife.  Patients wife will transport patient.  Per rounds today, expected discharge date has not been medically determined at this time. Patient and patients caregiver verbalize understanding and are involved in treatment planning and discharge process.    Additional Concerns    Wife reports she has been in contact with [facility] on Aging re: prescription asstc program & asstc with OOP cost of pt's immunos if pt is transplanted.  Wife reports she has completed application for SenioRx program and has been in touch with Ms. Ligia Shanks (ph: [phone]).  SW faxed completed application to [facility] on [facility], per wife's request.  SW will f/u with Ms. Shanks re: what type of asstc program may be able to provide to pt.    Patient and caregiver verbalize understanding and agreement with information reviewed,  availability, and how to access available resources as needed.  Patient and caregiver deny additional needs or concerns at this time.  Patient is being followed for needs, education, resources, information, emotional support, supportive counseling, and for supportive and skilled discharge plan of care.  providing ongoing psychosocial support, education, resources and d/c planning as needed.  SW remains available.

## 2018-11-30 NOTE — PROGRESS NOTES
UPDATE    ARINA faxed completed SenioRx application to [facility] on Aging (f: [phone]), per request from pt's wife.  ARINA spoke with Ms. Ligia Shanks (ph: [phone]) at the agency to request more details on SenioRx program.  Ms. Shanks confirms that she received fax.  Ms. Shanks states she believes they would be able to obtain the immunos listed (Prograf, Valcyte, Cellcept) if pt were to need them after a transplant.  Ms. Shanks states she will begin processing application.  ARINA explained that pt is not on immunos currently, but that pt/wife are looking for information on prospective asstc if pt were to receive a transplant.    ARINA returned application materials to wife at bedside and informed pt & wife that fax was received by Ms. Shanks.  Pt and wife deny any other needs or concerns to ARINA at this time.  ARINA following and remains available.

## 2018-11-30 NOTE — PHYSICIAN QUERY
"PT Name: Suman Hayden  MR #: [id number]    Physician Query Form - Heart  Condition Clarification     CDS/: Sergio Josue Jr, RN               Contact information:[email or web address]  This form is a permanent document in the medical record.     Query Date: November 30, 2018    By submitting this query, we are merely seeking further clarification of documentation. Please utilize your independent clinical judgment when addressing the question(s) below.    The medical record contains the following   Indicators     Supporting Clinical Findings Location in Medical Record   x BNP 1204-->1512-->1430 11/23-11/28 Labs    EF      Radiology findings      Echo Results      "Ascites" documented     x "SOB" or "PEREIRA" documented Respiratory: Negative for cough, chest tightness and shortness of breath.    11/23 H&P    "Hypoxia" documented     x Heart Failure documented Chronic combined systolic and diastolic congestive heart failure    -c/w home lisinopril, holding home aldactone.   11/29 Heart Transplant Progress Note   x "Edema" documented Musculoskeletal: Normal range of motion. He exhibits no edema.    11/23 H&P   x Diuretics/Meds furosemide injection 20 mg Once  furosemide injection 40 mg Once 11/24 MAR  11/25 MAR    Treatment:     x Other:  Neck: Neck supple. JVD present.      11/24 Heart Transplant Progress Note   Heart failure (HF) can be acute, chronic or both. It is generally further specificed as systolic, diastolic, or combined. Lastly, it is important to identify an underlying etiology if known or suspected.     Common clues to acute exacerbation:  Rapidly progressive symptoms (w/in 2 weeks of presentation), using IV diuretics to treat, using supplemental O2, pulmonary edema on Xray, MI w/in 4 weeks, and/or BNP >500    Systolic Heart Failure: is defined as chart documentation of a left ventricular ejection fraction (LVEF) less than 40%     Diastolic Heart Failure: is defined as a left ventricular " ejection fraction (LVEF) greater than 40%   +      Evidence of diastolic dysfunction on echocardiography OR    Right heart catheterization wedge pressure above 12 mm Hg OR    Left heart catheterization left ventricular end diastolic pressure 18 mm Hg or above.    References: *American Heart Association    The clinical guidelines noted below are only system guidelines, and do not replace the providers clinical judgment.     Provider, please specify the diagnosis associated with above clinical findings  Specify the Type and Acuity of Heart Failure    [   ] Acute on Chronic Combined Systolic and Diastolic Heart Failure                   [  x ] Chronic Combined Systolic and Diastolic Heart Failure  [   ] Other (please specify): ___________________________________  [  ] Clinically Undetermined                          Please document in your progress notes daily for the duration of treatment until resolved and include in your discharge summary.

## 2018-11-30 NOTE — ASSESSMENT & PLAN NOTE
"-Presented with fever, found to have leukocytosis, elevated lactate/CRP and mild INDIRA.  -Sources potentially: drive line infection (purulent discharge- sampled already and sent to lab; GS showed rare WBCs, CT abdomen showed small volume of fluid inferior aspect of VAD) vs skin infection (scrotal area with boils) vs UTI (pt with frequency, concentrated/dark urine).  -Derm consulted for management of scrotal skin issue. Continue Mucinprocin   -Appreciate Id Cx. Changed to Daptomycin yesterday.   -repeat blood cultures 11/27 are positive. Repeat blood cultures 11/29 NGTD.  -GLENN with "Ragged fibrous deposits along lead in right atrium, likely right ventricular lead, with filimentous mobile echodensity measuring 7mm (image 6), cannot exclude endocarditis." Will review w staff.   -WBC count significantly elevated this am as well as lactate.   -Held antihypertensives and giving IVF hydration. Will recheck bmp this afternoon. Low threshold to move to unit for closer observation.    "

## 2018-11-30 NOTE — PROGRESS NOTES
11/30/2018  Arsh Rogers    Current provider:  Angie Torres MD      I, Arsh Rogers, rounded on Suman Hayden to ensure all mechanical assist device settings (IABP or VAD) were appropriate and all parameters were within limits.  I was able to ensure all back up equipment was present, the staff had no issues, and the Perfusion Department daily rounding was complete.    8:01 AM

## 2018-11-30 NOTE — PROGRESS NOTES
[facility]  Infectious Disease  Progress Note    Patient Name: Suman Hayden  MRN: [id number]  Admission Date: 11/23/2018  Length of Stay: 7 days  Attending Physician: Angie Torres MD  Primary Care Provider: Joe Ernst MD    Isolation Status: Contact  Assessment/Plan:      Bacteremia    68 year old male with PMHx NICM s/p LVAD as DT 7/27/17, hx of staph epi bacteremia treated with 6 weeks of IV vancomycin 1/2018 admitted from LVAD clinic for drive line infection. 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  He has been on Vancomycin but now on Daptomycin.  WBC jumped from 18 to 43 overnight and renal function worsened.  New L UE edema today - ? DVT  - CT abdomen performed that showed small volume fluid inferior aspect of LVAD.   - LVAD DLES +MRSA  - 2D echo negative for vegetations  - GLENN + for vegetations   -febrile 103 no improved to 99.5. WBC 43K.       Plan:  -adjusted to daptomycin 10mg/kg q48 as cultures are still positive but renal function worse  -repeat blood cultures 11/27 are positive. Repeat blood cultures today  -Rec CT CAP given worsening WBC and left chest pain - if CT c/f pneumonia add ceftaroline 300mg q8h (renal adjusted)  - Rec US LUE to assess for DVT  -Pt will lneed long term IV abx  -ID will continue to follow.  - discussed with ID staff and primary team         Anticipated Disposition: tbd    Call Yashira Ramirez PA-C during weekend day or Dr. Bonilla overnight for questions.      Thank you for your consult. I will follow-up with patient. Please contact us if you have any additional questions.    MELISSA Covarrubias  Infectious Disease  [facility]    Subjective:     Principal Problem:Severe sepsis    HPI: 68 year old male with PMHx significant for HFrEF secondary to non ischemic cardiomyopathy underwent Heartmate 3 LVAD implanted as DT therapy 7/27/17, (NYHA class II) and VT on amiodarone s/p ICD (with revision in 11/2017  complicated by pocket hematoma, and prior hospitalization for ICD shocks secondary to atrial tachycardia with abberancy). Presents to ED today from clinic for worsening DLES infection (purulent discharge and blood tinged) and fever. Patient seen by VAD nurse and ID staff who felt admission to [facility] with IV antibiotics warranted. Patient also with fevers, chills, decreased appetite, scrotal boils with spontaneous purulent discharge/rupture, urinary incontinence and confusion at home for the past three days. In the ER, he was found febrile with Tmax of 103 deg F, doppler BP 60-70's and HR 70s. He was given 2 Liters NS IVFs, cefepime/vancomycin and resumed on home meds except aldactone. Blood cultures sent as well as gram stain and culture of specimen from drive line. CXR showed left small pleural effusion and CT abdomen performed that showed small volume fluid inferior aspect of LVAD along with small pericardial effusion. Labs notable for leukocytosis, mild INDIRA, elevated AST/ALT/T.bili, BNP and lactate 2.7 as well as CRP of 215. LDH was 294.  LVAD DLES- showing Staph Aureus.  Blood cxs showing- gram positive coccis.  Pt and wife today reports much improvement since the start of abx.        Interval History: Tmax 103, Tcurrent 99.4.  WBC increased from 18.33 to 43.37 and renal function worsened. GLENN shows filamentous vegetation on PPM leads.  CO Left rib/CP and L ar edema since today.  BCXs 11/29 NGTD,  Report fever, chills and sweats    Review of Systems   Constitutional: Positive for chills, diaphoresis and fever.   Respiratory: Negative for shortness of breath.    Cardiovascular: Negative for chest pain.   Gastrointestinal: Negative for abdominal pain, diarrhea, nausea and vomiting.   Genitourinary: Negative for dysuria and hematuria.   Musculoskeletal: Positive for back pain (chronic stable).     Objective:     Vital Signs (Most Recent):  Temp: 99.4 °F (37.4 °C) (11/30/18 0828)  Pulse: 93 (11/30/18 0731)  Resp: (!)  24 (11/30/18 0828)  BP: (!) 54/0 (11/30/18 0828)  SpO2: (!) 92 % (11/30/18 0828) Vital Signs (24h Range):  Temp:  [98.2 °F (36.8 °C)-103 °F (39.4 °C)] 99.4 °F (37.4 °C)  Pulse:  [80-93] 93  Resp:  [18-24] 24  SpO2:  [92 %-97 %] 92 %  BP: ()/(0-86) 54/0     Weight: 73.5 kg (162 lb 0.6 oz)  Body mass index is 23.93 kg/m².    Estimated Creatinine Clearance: 28.3 mL/min (A) (based on SCr of 2.5 mg/dL (H)).    Physical Exam   Constitutional: He is oriented to person, place, and time. He appears well-developed and well-nourished. No distress.       HENT:   Mouth/Throat: Oropharynx is clear and moist.   Eyes: Pupils are equal, round, and reactive to light.   Neck: Neck supple.   Cardiovascular:   Normal VAD hum   tachy   Pulmonary/Chest: Effort normal.   tachypneic   Abdominal: Soft. He exhibits no distension and no mass. There is no tenderness. There is no guarding.   Genitourinary:   Genitourinary Comments: Not examined today   Musculoskeletal: He exhibits no edema.   Neurological: He is alert and oriented to person, place, and time. No cranial nerve deficit.   Skin: Skin is warm and dry.   Psychiatric: He has a normal mood and affect. His behavior is normal.       Significant Labs:   Blood Culture:   Recent Labs   Lab 11/24/18  1215 11/24/18  1220 11/27/18  0446 11/27/18  0449 11/29/18  0601   LABBLOO Gram stain aer bottle: Gram positive cocci in clusters resembling Staph  Gram stain clement bottle: Gram positive cocci in clusters resembling Staph   Results called to and read back by: Trinity Forbes RN 11/25/2018  06:35  METHICILLIN RESISTANT STAPHYLOCOCCUS AUREUS  ID consult required at [facility],[place],[facility] and [facility].   Gram stain aer bottle: Gram positive cocci in clusters resembling Staph   Results called to and read back by: Trinity Forbes RN 11/25/2018  06:35  METHICILLIN RESISTANT STAPHYLOCOCCUS AUREUS  ID consult required at [facility] and [facility].  For  susceptibility see order #[id number]   Gram stain aer bottle: Gram positive cocci in clusters resembling Staph   Results called to and read back by: Ethel Miranda RN 11/28/2018  10:52  STAPHYLOCOCCUS AUREUS  Susceptibility pending  ID consult required at [facility] and [facility].   Gram stain aer bottle: Gram positive cocci in clusters resembling Staph  Positive results previously called  STAPHYLOCOCCUS AUREUS  ID consult required at [facility] and [facility].  For susceptibility see order #[id number]   No Growth to date  No Growth to date  No Growth to date  No Growth to date     CBC:   Recent Labs   Lab 11/29/18  0442 11/30/18  0500 11/30/18  0855   WBC 18.33*  18.33* 44.05*  44.05* 43.37*   HGB 8.5*  8.5* 9.9*  9.9* 9.4*   HCT 27.5*  27.5* 32.0*  32.0* 30.3*     242 374*  374* 344     CMP:   Recent Labs   Lab 11/29/18  0442 11/30/18  0500 11/30/18  0855   * 131* 132*   K 4.1 4.6 4.5    103 103   CO2 25 19* 22*    158* 181*   BUN 18 30* 34*   CREATININE 0.9 2.3* 2.5*   CALCIUM 8.2* 8.0* 7.9*   PROT  --  6.0  --    ALBUMIN  --  2.2*  --    BILITOT  --  2.3*  --    ALKPHOS  --  105  --    AST  --  106*  --    ALT  --  82*  --    ANIONGAP 5* 9 7*   EGFRNONAA >60.0 28.1* 25.4*     Wound Culture:   Recent Labs   Lab 11/23/18  1448   LABAERO METHICILLIN RESISTANT STAPHYLOCOCCUS AUREUS  Moderate       All pertinent labs within the past 24 hours have been reviewed.    Significant Imaging: I have reviewed all pertinent imaging results/findings within the past 24 hours.   X-Ray Chest 1 View [[id number]] Resulted: 11/25/18 1327   Order Status: Completed Updated: 11/25/18 1329   Narrative:     EXAMINATION:  XR CHEST 1 VIEW    CLINICAL HISTORY:  line placement;    TECHNIQUE:  Single frontal view of the chest was performed.    COMPARISON:  Multiple priors, most recent 11/23/2018    FINDINGS:  Interval placement of a right internal  jugular approach central venous catheter terminating at the expected location of the superior cavoatrial junction.  Stable positioning of cardiac pacemaker leads.  LVAD is in place.  Stable cardiomegaly.  Median sternotomy wires are intact aligned.  Small left pleural effusion with adjacent atelectasis.  No pneumothorax.   Impression:       Right IJ approach central venous catheter adequate position.  No pneumothorax.    Otherwise stable exam.      Electronically signed by: Lori Galicia  Date: 11/25/2018  Time: 13:27   CT Abdomen Pelvis Without Contrast [[id number]] Resulted: 11/23/18 1905   Order Status: Completed Updated: 11/23/18 1907   Narrative:     EXAMINATION:  CT ABDOMEN PELVIS WITHOUT CONTRAST    CLINICAL HISTORY:  driveline infection    TECHNIQUE:  Low dose axial images, sagittal and coronal reformations were obtained from the lung bases to the pubic synthesis following the oral administration of 30 mL of Omnipaque 350.  Intravenous contrast was not administered.    COMPARISON:  CT chest abdomen pelvis 05/09/2018    FINDINGS:  Bilateral gynecomastia.    Postoperative changes from LVAD placement.  New small volume of fluid identified at the inferior aspect of the LVAD.  New small volume pericardial fluid.  No inflammatory changes or fluid collection noted along the course of the drive line within the ventral abdominal wall.  Partially visualized pacemaker wires.  Calcification of the aortic annulus.  Calcification within the left ventricular wall similar to prior exam.  Coronary artery atherosclerosis.    Small left pleural effusion with associated compressive atelectasis.  Right lung base is clear.    Evaluation of upper abdomen is limited secondary to artifact from LVAD.  Liver is normal in size and attenuation without focal hepatic lesion.  There is gallbladder sludge.  The spleen, adrenal glands, and pancreas are unremarkable.    Kidneys are normal in size and location.  No hydronephrosis or ureteral  dilatation.  No renal stone.  Urinary bladder is unremarkable.    Stomach is unremarkable.  Small and large bowel are normal in caliber without evidence of obstruction.  Rectum is distended with fecal material and there is possible mild rectal wall thickening.    No retroperitoneal or mesenteric adenopathy.  No free intraperitoneal fluid or air.    Calcific atherosclerosis of the abdominal aorta and its branch vessels.    Body wall edema.    Sternotomy wires.  Degenerative changes of the spine.  No acute fracture.   Impression:       1. New small volume fluid identified at the inferior aspect of the LVAD.  Finding is nonspecific however may represent an infection source given clinical concern.  2. New small volume pericardial fluid.  3. Small left pleural effusion.  4. Body wall edema.  5. Additional findings as above.    Electronically signed by resident: Roland Schmitt  Date: 11/23/2018  Time: 18:33    Electronically signed by: Anuj Gomez MD  Date: 11/23/2018  Time: 19:05   X-Ray Chest AP Portable [[id number]] Resulted: 11/23/18 1754   Order Status: Completed Updated: 11/23/18 1757   Narrative:     EXAMINATION:  XR CHEST AP PORTABLE    CLINICAL HISTORY:  Sepsis;    TECHNIQUE:  Single frontal view of the chest was performed.    COMPARISON:  10/24/2018    FINDINGS:  Left-sided cardiac defibrillator noted.  LVAD noted.  There is new small left pleural effusion with left basilar atelectasis or consolidation.  There is no pneumothorax.  Cardiac silhouette is enlarged.   Impression:       As above.      Electronically signed by: Anuj Gomez MD  Date: 11/23/2018  Time: 17:54

## 2018-11-30 NOTE — PHYSICIAN QUERY
PT Name: Suman Hayden  MR #: [id number]     Physician Query Form - Cardiomyopathy Clarification     CDS/: Sergio Josue Jr, RN               Contact information:[email or web address]  This form is a permanent document in the medical record.     Query Date: November 30, 2018    By submitting this query, we are merely seeking further clarification of documentation to reflect the severity of illness of your patient. Please utilize your independent clinical judgment when addressing the question(s) below.    The Medical record reflects the following:     Indicators   Supporting Clinical Findings Location in Medical Record   x Cardiomyopathy, NICM, CM or similar term documented 68 year old male with PMHx significant for HFrEF secondary to non ischemic cardiomyopathy    11/29 Heart Transplant Progress Note   x Acute/Chronic Illness Essential hypertension    -resume ACE and aldactone  -goal MAP less than 90   11/23 H&P   x Echo, Radiology, and/or Heart Cath Findings LVAD present. Base speed is 5200. The pump type is a Heartmate III.  The interventricular septum appears midline. The aortic valve opens minimally intermittently.  Severely decreased left ventricular systolic function. The estimated ejection fraction is 15%  Moderate right ventricular enlargement.  Moderately reduced right ventricular systolic function.  Grade I (mild) left ventricular diastolic dysfunction consistent with impaired relaxation.  Moderate left atrial enlargement. 11/26 Thoracic Echo    BiPAP/Intubation/Supplemental O2      SOB, PEREIRA, Fatigue, Dizziness, LE Edema, Cyanosis, Chest Pain, Pulmonary HTN, Respiratory Distress, Hypoxia, etc.     x Treatment                                 Medication underwent Heartmate  3 LVAD implanted as DT therapy 7/27/17.     11/23 H&P    Other       Provider, please specify the type of cardiomyopathy:    [  x ] Non-ischemic Dilated (congestive)    [   ] Cardiomyopathy, type unspecified or  unknown   [   ] Other type of cardiomyopathy (please specify):    [   ]  Clinically Undetermined       Please document in your progress notes daily for the duration of treatment until resolved, and include in your discharge summary.

## 2018-11-30 NOTE — PROGRESS NOTES
[facility]  Heart Transplant  Progress Note    Patient Name: Suman Hayden  MRN: [id number]  Admission Date: 11/23/2018  Hospital Length of Stay: 7 days  Attending Physician: Angie Torres MD  Primary Care Provider: Joe Ernst MD  Principal Problem:Severe sepsis    Subjective:     Interval History: Did not sleep well overnight. Also complaining of some shoulder and back pain.     Scheduled Meds:   amiodarone  400 mg Oral BID    DAPTOmycin (CUBICIN)  IV  10 mg/kg Intravenous Q24H    dronabinol  5 mg Oral BID    ferrous sulfate  325 mg Oral TID    mupirocin   Topical (Top) Daily    oxyCODONE-acetaminophen  1 tablet Oral Once    pantoprazole  40 mg Oral BID    warfarin  2.5 mg Oral Daily     PRN Meds:acetaminophen, polyethylene glycol, polyethylene glycol, traMADol    Review of patient's allergies indicates:   Allergen Reactions    Asa [aspirin] Other (See Comments)     Bleeding ulcer     Objective:     Vital Signs (Most Recent):  Temp: 99.4 °F (37.4 °C) (11/30/18 0828)  Pulse: 93 (11/30/18 0731)  Resp: (!) 24 (11/30/18 0828)  BP: (!) 54/0 (11/30/18 0828)  SpO2: (!) 92 % (11/30/18 0828) Vital Signs (24h Range):  Temp:  [98.2 °F (36.8 °C)-103 °F (39.4 °C)] 99.4 °F (37.4 °C)  Pulse:  [80-93] 93  Resp:  [18-24] 24  SpO2:  [92 %-97 %] 92 %  BP: ()/(0-86) 54/0     Patient Vitals for the past 72 hrs (Last 3 readings):   Weight   11/30/18 0400 73.5 kg (162 lb 0.6 oz)   11/29/18 0416 75.7 kg (166 lb 14.2 oz)   11/28/18 0610 76.4 kg (168 lb 6.9 oz)     Body mass index is 23.93 kg/m².      Intake/Output Summary (Last 24 hours) at 11/30/2018 0949  Last data filed at 11/30/2018 0400  Gross per 24 hour   Intake 1575 ml   Output 450 ml   Net 1125 ml     CVP:  [8 mmHg] 8 mmHg  Physical Exam   Constitutional: He is oriented to person, place, and time. No distress.   HENT:   Mouth/Throat: Oropharynx is clear and moist.   Eyes: Pupils are equal, round, and reactive to light.   Neck: Neck  supple.   Cardiovascular:   Normal VAD hum    Pulmonary/Chest: Effort normal.   Abdominal: Soft. He exhibits no distension and no mass. There is no tenderness. There is no guarding.   Genitourinary:   Genitourinary Comments: Three boils present on testicles.  2 of which were openly draining   Musculoskeletal: He exhibits no edema.   Neurological: He is alert and oriented to person, place, and time. No cranial nerve deficit.   Skin: Skin is warm and dry.   Psychiatric: He has a normal mood and affect. His behavior is normal.     Significant Labs:  CBC:  Recent Labs   Lab 11/29/18  0442 11/30/18  0500 11/30/18  0855   WBC 18.33*  18.33* 44.05*  44.05* 43.37*   RBC 3.55*  3.55* 4.31*  4.31* 4.08*   HGB 8.5*  8.5* 9.9*  9.9* 9.4*   HCT 27.5*  27.5* 32.0*  32.0* 30.3*     242 374*  374* 344   MCV 78*  78* 74*  74* 74*   MCH 23.9*  23.9* 23.0*  23.0* 23.0*   MCHC 30.9*  30.9* 30.9*  30.9* 31.0*     BNP:  Recent Labs   Lab 11/26/18  0424 11/28/18  0301 11/30/18  0500   BNP 1,512* 1,430*  1,430* 1,005*     CMP:  Recent Labs   Lab 11/27/18  0409 11/28/18  0301 11/29/18  0442 11/30/18  0500 11/30/18  0855   GLU 89 95 104 158* 181*   CALCIUM 8.0* 8.1* 8.2* 8.0* 7.9*   ALBUMIN 2.1* 2.2*  2.2*  --  2.2*  --    PROT 5.4* 5.7*  5.7*  --  6.0  --    * 134* 135* 131* 132*   K 3.9 4.1 4.1 4.6 4.5   CO2 25 24 25 19* 22*    104 105 103 103   BUN 20 20 18 30* 34*   CREATININE 0.8 0.9 0.9 2.3* 2.5*   ALKPHOS 83 93  93  --  105  --    ALT 87* 84*  84*  --  82*  --    * 91*  91*  --  106*  --    BILITOT 1.2* 1.5*  1.5*  --  2.3*  --       Coagulation:   Recent Labs   Lab 11/28/18  0301 11/29/18  0442 11/30/18  0500   INR 2.5*  2.5* 2.6*  2.6* 3.0*   APTT 32.4*  32.4* 33.6*  33.6* 31.2  31.2     LDH:  Recent Labs   Lab 11/30/18  0500   *     Microbiology:  Microbiology Results (last 7 days)     Procedure Component Value Units Date/Time    Blood culture [[id number]] Collected:   11/27/18 0449    Order Status:  Completed Specimen:  Blood from Peripheral, Antecubital, Right Updated:  11/30/18 0854     Blood Culture, Routine Gram stain aer bottle: Gram positive cocci in clusters resembling Staph     Blood Culture, Routine Positive results previously called     Blood Culture, Routine --     STAPHYLOCOCCUS AUREUS  ID consult required at [facility].  For susceptibility see order #[id number]      Narrative:       Draw from 2 different sites at least 30 minutes apart.    Blood culture [[id number]] Collected:  11/27/18 0446    Order Status:  Completed Specimen:  Blood from Peripheral, Antecubital, Left Updated:  11/30/18 0848     Blood Culture, Routine Gram stain aer bottle: Gram positive cocci in clusters resembling Staph      Blood Culture, Routine Results called to and read back by: Ethel Miranda RN 11/28/2018  10:52     Blood Culture, Routine --     STAPHYLOCOCCUS AUREUS  Susceptibility pending  ID consult required at [facility] and [facility].      Narrative:       Draw from 2 different sites at least 30 minutes apart.    Blood culture [[id number]]  (Susceptibility) Collected:  11/24/18 1215    Order Status:  Completed Specimen:  Blood from Peripheral, Forearm, Right Updated:  11/30/18 0847     Blood Culture, Routine Gram stain aer bottle: Gram positive cocci in clusters resembling Staph     Blood Culture, Routine Gram stain clement bottle: Gram positive cocci in clusters resembling Staph      Blood Culture, Routine Results called to and read back by: Trinity Forbes RN 11/25/2018  06:35     Blood Culture, Routine --     METHICILLIN RESISTANT STAPHYLOCOCCUS AUREUS  ID consult required at [facility] and [facility]   locations.      Blood culture [[id number]] Collected:  11/29/18 0601    Order Status:  Completed Specimen:  Blood Updated:  11/30/18 0812     Blood Culture, Routine No Growth to date     Blood Culture,  Routine No Growth to date    Blood culture [[id number]] Collected:  11/29/18 0601    Order Status:  Completed Specimen:  Blood Updated:  11/30/18 0812     Blood Culture, Routine No Growth to date     Blood Culture, Routine No Growth to date    Blood culture [[id number]] Collected:  11/24/18 1220    Order Status:  Completed Specimen:  Blood from Peripheral, Wrist, Right Updated:  11/27/18 0950     Blood Culture, Routine Gram stain aer bottle: Gram positive cocci in clusters resembling Staph      Blood Culture, Routine Results called to and read back by: Trinity Forbes RN 11/25/2018  06:35     Blood Culture, Routine --     METHICILLIN RESISTANT STAPHYLOCOCCUS AUREUS  ID consult required at [facility].  For susceptibility see order #[id number]      Blood culture x two cultures. Draw prior to antibiotics. [[id number]] Collected:  11/23/18 1613    Order Status:  Completed Specimen:  Blood from Peripheral, Antecubital, Left Updated:  11/26/18 1013     Blood Culture, Routine Gram stain clement bottle: Gram positive cocci in clusters resembling Staph     Blood Culture, Routine Results called to and read back by: Sharlene Landon RN 11/24/2018  09:36     Blood Culture, Routine --     METHICILLIN RESISTANT STAPHYLOCOCCUS AUREUS  ID consult required at [facility] and [facility].  For susceptibility see order #[id number]      Narrative:       Aerobic and anaerobic    Blood culture x two cultures. Draw prior to antibiotics. [[id number]]  (Susceptibility) Collected:  11/23/18 1613    Order Status:  Completed Specimen:  Blood from Peripheral, Hand, Left Updated:  11/26/18 1012     Blood Culture, Routine Gram stain clement bottle: Gram positive cocci in clusters resembling Staph     Blood Culture, Routine Results called to and read back by: Sharlene Landon RN 11/24/2018  09:35     Blood Culture, Routine Gram stain aer bottle: Gram positive cocci in clusters resembling Staph      Blood Culture, Routine --     METHICILLIN RESISTANT STAPHYLOCOCCUS AUREUS  ID consult required at [facility],[place],[facility] and [facility]   locations.      Narrative:       Aerobic and anaerobic    Blood culture [[id number]]     Order Status:  Canceled Specimen:  Blood     Blood culture [[id number]]     Order Status:  Canceled Specimen:  Blood         I have reviewed all pertinent labs within the past 24 hours.    Estimated Creatinine Clearance: 28.3 mL/min (A) (based on SCr of 2.5 mg/dL (H)).    Diagnostic Results:  I have reviewed and interpreted all pertinent imaging results/findings within the past 24 hours.    Assessment and Plan:     68 year old male with PMHx significant for HFrEF secondary to non ischemic cardiomyopathy underwent Heartmate  3 LVAD implanted as DT therapy 7/27/17 and VT on amiodarone s/p ICD (with revision in 11/2017 complicated by pocket hematoma). Presents to ED today from clinic for worsening DLES infection and fever. Patient seen by VAD nurse and ID staff who feel admission to [facility] with IV antibiotics warranted. Patient also with fevers, chills, decreased appetite, scrotal boils with spontaneous purulent discharge/rupture, urinary incontinence and confusion at home. History provided by patient and supplemented by his wife. He denies recent travel or being around sick contacts. He is complaint with taking his home medications. Of note, he was recently hospitalized for GIB, history of ileal ulcer s/p intervention. Being followed also by EP by Dr. Winchester for possible future VT ablation. Pt denies recent ICD shocks as well as low flow alarms from his VAD.  In the ER, he was found febrile with Tmax of 103 deg F, doppler BP 60-70's and HR 70s. He was given 2 Liters NS IVFs, cefepime/vancomycin and resumed on home meds except aldactone. Blood cultures sent as well as gram stain and culture of specimen from drive line. CXR showed left small pleural effusion and CT abdomen performed that showed  "small volume fluid inferior aspect of LVAD along with small pericardial effusion. Labs notable for leukocytosis, mild INDIRA, elevated AST/ALT/T.bili, BNP and lactate 2.7 as well as CRP of 215. LDH was 294. VAD was interrogated and noted to have PI events. Pt was admitted to the ICU for further management/care.     * Severe sepsis    -Presented with fever, found to have leukocytosis, elevated lactate/CRP and mild INDIRA.  -Sources potentially: drive line infection (purulent discharge- sampled already and sent to lab; GS showed rare WBCs, CT abdomen showed small volume of fluid inferior aspect of VAD) vs skin infection (scrotal area with boils) vs UTI (pt with frequency, concentrated/dark urine).  -Derm consulted for management of scrotal skin issue. Continue Mucinprocin   -Appreciate Id Cx. Changed to Daptomycin yesterday.   -repeat blood cultures 11/27 are positive. Repeat blood cultures 11/29 NGTD.  -GLENN with "Ragged fibrous deposits along lead in right atrium, likely right ventricular lead, with filimentous mobile echodensity measuring 7mm (image 6), cannot exclude endocarditis." Will review w staff.   -WBC count significantly elevated this am as well as lactate.   -Held antihypertensives and giving IVF hydration. Will recheck bmp this afternoon. Low threshold to move to unit for closer observation.       Ventricular tachyarrhythmia    - EP consulted. Successfully ATP'd out of VT. Increased ATP burst intervals and lowered detection zone. Activated tachytherapies as well see EP note.   -Transitioned to PO amio.      LVAD (left ventricular assist device) present    -HM3 5200 rpm, DT.  -Not on home diuretics. CVP 8.   -INR therapeutic, continue coumadin.  -ASA stopped on 12/22/17 after GI bleed and ileal ulcer   -LDH stable  -BP wnl.    Procedure: Device Interrogation Including analysis of device parameters  Current Settings: Ventricular Assist Device  Review of device function is stable/unstable stable    TXP LVAD " INTERROGATIONS 11/30/2018 11/30/2018 11/29/2018 11/29/2018 11/29/2018 11/29/2018 11/29/2018   Type HeartMate3 HeartMate3 HeartMate3 HeartMate3 HeartMate3 HeartMate3 HeartMate3   Flow 3.7 4.0 4.3 4.0 3.6 4.1 4.0   Speed 5200 5200 5200 5200 5200 5200 5200   PI 7.6 7.6 3.9 7.2 7.9 2.8 4.0   Power ([place]) 3.7 3.6 3.5 3.6 3.6 3.6 3.6   LSL 4800 4800 4800 4800 - - 4800   Pulsatility - - - Intermittent pulse - - Intermittent pulse        INDIRA (acute kidney injury)    -May be secondary to pt being NPO all day yesterday vs hypotension during GLENN?  -Hold ACEI and will give IV hydration. Will recheck bmp this afternoon.        Transaminitis    -likely due to sepsis vs effects of amiodarone  -will trend LFT's   -CT abdomen: liver is normal in size and attenuation without focal hepatic lesion.  There is gallbladder sludge.  The spleen, adrenal glands, and pancreas are unremarkable.   -holding home pravastatin     Iron deficiency anemia due to chronic blood loss    -c/w home oral iron supplementation tablets     Chronic combined systolic and diastolic congestive heart failure    -Holding home aldactone/Lisinopril.       Kishore Paz, NP  Heart Transplant  [facility]

## 2018-11-30 NOTE — PLAN OF CARE
Interval update:     Nursing not able to obtain cuff BP, doppler 58mmHg. Patient asymptomatic, with poor po intake all day. Currently treated for severe sepsis / bacteremia. No VAD alarms    Will dc Lisinopril 5mg and give gentle IV hydration 75cc/hr x 4 hours (ordered).   Dr. Hebert Chavarria M.D.  Page # [phone]  Cardiovascular Fellow PGY-IV  [facility]

## 2018-11-30 NOTE — PLAN OF CARE
Problem: Patient Care Overview  Goal: Plan of Care Review  Outcome: Ongoing (interventions implemented as appropriate)  Plan of care reviewed with pt. Pt remained free of falls, trauma, and injury. LVAD working properly and sounds smooths. Tmax: 99.9. Daptamycin increased tp 755 mg. Dopper 48-60. Total of 700 ml of NS given. Flexeril and tramadol added for pain. LVAD dressing remains dry and intact INR is 3.0 LD is 266. BNP: 1005. Lactic Acid 4.4. WBC: 43.37. SVO2: 35.  Contact isolation continued. Will continue to monitor.

## 2018-11-30 NOTE — PROGRESS NOTES
Notified Kishore Paz. Awaiting orders. Will continue to monitor.        11/30/18 0828   Vital Signs   BP (!) 54/0   BP Location Right arm   BP Method Doppler   Patient Position Lying

## 2018-11-30 NOTE — PT/OT/SLP PROGRESS
Physical Therapy Treatment    Patient Name:  Suman Hayden   MRN:  [id number]    Recommendations:     Discharge Recommendations:  home health PT   Discharge Equipment Recommendations: none   Barriers to discharge: Decreased caregiver support wife may not be able to assist pt at current functional level.     Assessment:     Suman Hayden is a 68 y.o. male admitted with a medical diagnosis of Severe sepsis.  He presents with the following impairments/functional limitations:  weakness, impaired functional mobilty, gait instability, impaired endurance, impaired balance, decreased lower extremity function, decreased coordination, decreased safety awareness pt jesse treatment better being able to gait train farther. Pt will benefit from cont skilled PT 4x/wk to progress physically and return pt to highest functional level possible pt will be able to discharge home with HHPT and DME to be determined. .    Rehab Prognosis:  Good ; patient would benefit from acute skilled PT services to address these deficits and reach maximum level of function.      Recent Surgery: Procedure(s) (LRB):  ECHOCARDIOGRAM,TRANSESOPHAGEAL (N/A) 1 Day Post-Op    Plan:     During this hospitalization, patient to be seen 4 x/week to address the above listed problems via gait training, therapeutic activities, therapeutic exercises, neuromuscular re-education  · Plan of Care Expires:  12/27/18   Plan of Care Reviewed with: patient    Subjective     Communicated with nurse prior to session.  Patient found supine upon PT entry to room, agreeable to treatment.      Chief Complaint: pt c/o feeling tired.   Patient comments/goals:  To get better and go home.   Pain/Comfort:  · Pain Rating 1: 0/10  · Pain Rating Post-Intervention 1: 0/10    Patients cultural, spiritual, Jewish conflicts given the current situation: none    Objective:     Patient found with: PICC line, oxygen, LVAD, telemetry     General Precautions: Standard, fall, contact, LVAD    Orthopedic Precautions:    Braces:       Functional Mobility:  · Bed Mobility:   Pt needed verbal cues for hand placement and sequencing for functional mobility.   · Rolling Right: minimum assistance  · Supine to Sit: maximal assistance  ·   · Transfers:     · Sit to Stand:  minimum assistance with hand-held assist. Pt stood x 3 reps.   ·   · Gait: pt received gait training ~ 28 ft, 26 ft (54 ft total)  with RW, CGA,  O2 intact and emergency bag present. pt had sitting rest period between distance ambulated and was additional assist of 1 to follow with chair.       AM-PAC 6 CLICK MOBILITY  Turning over in bed (including adjusting bedclothes, sheets and blankets)?: 2  Sitting down on and standing up from a chair with arms (e.g., wheelchair, bedside commode, etc.): 3  Moving from lying on back to sitting on the side of the bed?: 2  Moving to and from a bed to a chair (including a wheelchair)?: 3  Need to walk in hospital room?: 3  Climbing 3-5 steps with a railing?: 1  Basic Mobility Total Score: 14       Therapeutic Activities and Exercises:   pt was max assist to position batteries and use holster for LVAD.     Patient left up in chair with all lines intact, call button in reach and wife present..    GOALS:   Multidisciplinary Problems     Physical Therapy Goals        Problem: Physical Therapy Goal    Goal Priority Disciplines Outcome Goal Variances Interventions   Physical Therapy Goal     PT, PT/OT Ongoing (interventions implemented as appropriate)     Description:  Goals to be met by: 18     Patient will increase functional independence with mobility by performin. Supine to sit with [place] -not met  2. Sit to supine with [place] -not met  3. Sit to stand transfer with Stand-by Assistance-not met  4. Bed to chair transfer with Stand-by Assistance using no AD -not met  5. Gait  x 150 feet with Minimal Assistance using no AD -not met  6. Stand for 5 minutes with Stand-by Assistance using  no AD -not met                       Time Tracking:     PT Received On: 11/30/18  PT Start Time: 1447     PT Stop Time: 1521  PT Total Time (min): 34 min     Billable Minutes: Gait Training 14 min and Therapeutic Activity 20 min    Treatment Type: Treatment  PT/PTA: PT     PTA Visit Number: 0     Astrid Whaley, PT  11/30/2018

## 2018-11-30 NOTE — SUBJECTIVE & OBJECTIVE
Interval History: Did not sleep well overnight. Also complaining of some shoulder and back pain.     Scheduled Meds:   amiodarone  400 mg Oral BID    DAPTOmycin (CUBICIN)  IV  10 mg/kg Intravenous Q24H    dronabinol  5 mg Oral BID    ferrous sulfate  325 mg Oral TID    mupirocin   Topical (Top) Daily    oxyCODONE-acetaminophen  1 tablet Oral Once    pantoprazole  40 mg Oral BID    warfarin  2.5 mg Oral Daily     PRN Meds:acetaminophen, polyethylene glycol, polyethylene glycol, traMADol    Review of patient's allergies indicates:   Allergen Reactions    Asa [aspirin] Other (See Comments)     Bleeding ulcer     Objective:     Vital Signs (Most Recent):  Temp: 99.4 °F (37.4 °C) (11/30/18 0828)  Pulse: 93 (11/30/18 0731)  Resp: (!) 24 (11/30/18 0828)  BP: (!) 54/0 (11/30/18 0828)  SpO2: (!) 92 % (11/30/18 0828) Vital Signs (24h Range):  Temp:  [98.2 °F (36.8 °C)-103 °F (39.4 °C)] 99.4 °F (37.4 °C)  Pulse:  [80-93] 93  Resp:  [18-24] 24  SpO2:  [92 %-97 %] 92 %  BP: ()/(0-86) 54/0     Patient Vitals for the past 72 hrs (Last 3 readings):   Weight   11/30/18 0400 73.5 kg (162 lb 0.6 oz)   11/29/18 0416 75.7 kg (166 lb 14.2 oz)   11/28/18 0610 76.4 kg (168 lb 6.9 oz)     Body mass index is 23.93 kg/m².      Intake/Output Summary (Last 24 hours) at 11/30/2018 0949  Last data filed at 11/30/2018 0400  Gross per 24 hour   Intake 1575 ml   Output 450 ml   Net 1125 ml     CVP:  [8 mmHg] 8 mmHg  Physical Exam   Constitutional: He is oriented to person, place, and time. No distress.   HENT:   Mouth/Throat: Oropharynx is clear and moist.   Eyes: Pupils are equal, round, and reactive to light.   Neck: Neck supple.   Cardiovascular:   Normal VAD hum    Pulmonary/Chest: Effort normal.   Abdominal: Soft. He exhibits no distension and no mass. There is no tenderness. There is no guarding.   Genitourinary:   Genitourinary Comments: Three boils present on testicles.  2 of which were openly draining   Musculoskeletal: He  exhibits no edema.   Neurological: He is alert and oriented to person, place, and time. No cranial nerve deficit.   Skin: Skin is warm and dry.   Psychiatric: He has a normal mood and affect. His behavior is normal.     Significant Labs:  CBC:  Recent Labs   Lab 11/29/18  0442 11/30/18  0500 11/30/18  0855   WBC 18.33*  18.33* 44.05*  44.05* 43.37*   RBC 3.55*  3.55* 4.31*  4.31* 4.08*   HGB 8.5*  8.5* 9.9*  9.9* 9.4*   HCT 27.5*  27.5* 32.0*  32.0* 30.3*     242 374*  374* 344   MCV 78*  78* 74*  74* 74*   MCH 23.9*  23.9* 23.0*  23.0* 23.0*   MCHC 30.9*  30.9* 30.9*  30.9* 31.0*     BNP:  Recent Labs   Lab 11/26/18  0424 11/28/18  0301 11/30/18  0500   BNP 1,512* 1,430*  1,430* 1,005*     CMP:  Recent Labs   Lab 11/27/18  0409 11/28/18  0301 11/29/18  0442 11/30/18  0500 11/30/18  0855   GLU 89 95 104 158* 181*   CALCIUM 8.0* 8.1* 8.2* 8.0* 7.9*   ALBUMIN 2.1* 2.2*  2.2*  --  2.2*  --    PROT 5.4* 5.7*  5.7*  --  6.0  --    * 134* 135* 131* 132*   K 3.9 4.1 4.1 4.6 4.5   CO2 25 24 25 19* 22*    104 105 103 103   BUN 20 20 18 30* 34*   CREATININE 0.8 0.9 0.9 2.3* 2.5*   ALKPHOS 83 93  93  --  105  --    ALT 87* 84*  84*  --  82*  --    * 91*  91*  --  106*  --    BILITOT 1.2* 1.5*  1.5*  --  2.3*  --       Coagulation:   Recent Labs   Lab 11/28/18  0301 11/29/18  0442 11/30/18  0500   INR 2.5*  2.5* 2.6*  2.6* 3.0*   APTT 32.4*  32.4* 33.6*  33.6* 31.2  31.2     LDH:  Recent Labs   Lab 11/30/18  0500   *     Microbiology:  Microbiology Results (last 7 days)     Procedure Component Value Units Date/Time    Blood culture [[id number]] Collected:  11/27/18 0449    Order Status:  Completed Specimen:  Blood from Peripheral, Antecubital, Right Updated:  11/30/18 0854     Blood Culture, Routine Gram stain aer bottle: Gram positive cocci in clusters resembling Staph     Blood Culture, Routine Positive results previously called     Blood Culture, Routine --      STAPHYLOCOCCUS AUREUS  ID consult required at [facility] and [facility].  For susceptibility see order #[id number]      Narrative:       Draw from 2 different sites at least 30 minutes apart.    Blood culture [[id number]] Collected:  11/27/18 0446    Order Status:  Completed Specimen:  Blood from Peripheral, Antecubital, Left Updated:  11/30/18 0848     Blood Culture, Routine Gram stain aer bottle: Gram positive cocci in clusters resembling Staph      Blood Culture, Routine Results called to and read back by: Ethel Miranda RN 11/28/2018  10:52     Blood Culture, Routine --     STAPHYLOCOCCUS AUREUS  Susceptibility pending  ID consult required at [facility].      Narrative:       Draw from 2 different sites at least 30 minutes apart.    Blood culture [[id number]]  (Susceptibility) Collected:  11/24/18 1215    Order Status:  Completed Specimen:  Blood from Peripheral, Forearm, Right Updated:  11/30/18 0847     Blood Culture, Routine Gram stain aer bottle: Gram positive cocci in clusters resembling Staph     Blood Culture, Routine Gram stain clement bottle: Gram positive cocci in clusters resembling Staph      Blood Culture, Routine Results called to and read back by: Trinity Forbes RN 11/25/2018  06:35     Blood Culture, Routine --     METHICILLIN RESISTANT STAPHYLOCOCCUS AUREUS  ID consult required at [facility] and [facility]   locations.      Blood culture [[id number]] Collected:  11/29/18 0601    Order Status:  Completed Specimen:  Blood Updated:  11/30/18 0812     Blood Culture, Routine No Growth to date     Blood Culture, Routine No Growth to date    Blood culture [[id number]] Collected:  11/29/18 0601    Order Status:  Completed Specimen:  Blood Updated:  11/30/18 0812     Blood Culture, Routine No Growth to date     Blood Culture, Routine No Growth to date    Blood culture [[id number]] Collected:  11/24/18 1220    Order Status:   Completed Specimen:  Blood from Peripheral, Wrist, Right Updated:  11/27/18 0950     Blood Culture, Routine Gram stain aer bottle: Gram positive cocci in clusters resembling Staph      Blood Culture, Routine Results called to and read back by: Trinity Forbes RN 11/25/2018  06:35     Blood Culture, Routine --     METHICILLIN RESISTANT STAPHYLOCOCCUS AUREUS  ID consult required at [facility].  For susceptibility see order #[id number]      Blood culture x two cultures. Draw prior to antibiotics. [[id number]] Collected:  11/23/18 1613    Order Status:  Completed Specimen:  Blood from Peripheral, Antecubital, Left Updated:  11/26/18 1013     Blood Culture, Routine Gram stain clement bottle: Gram positive cocci in clusters resembling Staph     Blood Culture, Routine Results called to and read back by: Sharlene Landon RN 11/24/2018  09:36     Blood Culture, Routine --     METHICILLIN RESISTANT STAPHYLOCOCCUS AUREUS  ID consult required at [facility].  For susceptibility see order #[id number]      Narrative:       Aerobic and anaerobic    Blood culture x two cultures. Draw prior to antibiotics. [[id number]]  (Susceptibility) Collected:  11/23/18 1613    Order Status:  Completed Specimen:  Blood from Peripheral, Hand, Left Updated:  11/26/18 1012     Blood Culture, Routine Gram stain clement bottle: Gram positive cocci in clusters resembling Staph     Blood Culture, Routine Results called to and read back by: Sharlene Landon RN 11/24/2018  09:35     Blood Culture, Routine Gram stain aer bottle: Gram positive cocci in clusters resembling Staph     Blood Culture, Routine --     METHICILLIN RESISTANT STAPHYLOCOCCUS AUREUS  ID consult required at [facility].      Narrative:       Aerobic and anaerobic    Blood culture [[id number]]     Order Status:  Canceled Specimen:  Blood     Blood culture [[id number]]     Order  Status:  Canceled Specimen:  Blood         I have reviewed all pertinent labs within the past 24 hours.    Estimated Creatinine Clearance: 28.3 mL/min (A) (based on SCr of 2.5 mg/dL (H)).    Diagnostic Results:  I have reviewed and interpreted all pertinent imaging results/findings within the past 24 hours.

## 2018-11-30 NOTE — ASSESSMENT & PLAN NOTE
68 year old male with PMHx NICM s/p LVAD as DT 7/27/17, hx of staph epi bacteremia treated with 6 weeks of IV vancomycin 1/2018 admitted from LVAD clinic for drive line infection. 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  He has been on Vancomycin but now on Daptomycin.  WBC jumped from 18 to 43 overnight and renal function worsened.  New L UE edema today - ? DVT  - CT abdomen performed that showed small volume fluid inferior aspect of LVAD.   - LVAD DLES +MRSA  - 2D echo negative for vegetations  - GLENN + for vegetations   -febrile 103 no improved to 99.5. WBC 43K.       Plan:  -adjusted to daptomycin 10mg/kg q48 as cultures are still positive but renal function worse  -repeat blood cultures 11/27 are positive. Repeat blood cultures today  -Rec CT CAP given worsening WBC and left chest pain - if CT c/f pneumonia add ceftaroline 300mg q8h (renal adjusted)  - Rec US LUE to assess for DVT  -Pt will lneed long term IV abx  -ID will continue to follow.  - discussed with ID staff and primary team

## 2018-11-30 NOTE — SUBJECTIVE & OBJECTIVE
Interval History: Tmax 103, Tcurrent 99.4.  WBC increased from 18.33 to 43.37 and renal function worsened. GLENN shows filamentous vegetation on PPM leads.  CO Left rib/CP and L ar edema since today.  BCXs 11/29 NGTD,  Report fever, chills and sweats    Review of Systems   Constitutional: Positive for chills, diaphoresis and fever.   Respiratory: Negative for shortness of breath.    Cardiovascular: Negative for chest pain.   Gastrointestinal: Negative for abdominal pain, diarrhea, nausea and vomiting.   Genitourinary: Negative for dysuria and hematuria.   Musculoskeletal: Positive for back pain (chronic stable).     Objective:     Vital Signs (Most Recent):  Temp: 99.4 °F (37.4 °C) (11/30/18 0828)  Pulse: 93 (11/30/18 0731)  Resp: (!) 24 (11/30/18 0828)  BP: (!) 54/0 (11/30/18 0828)  SpO2: (!) 92 % (11/30/18 0828) Vital Signs (24h Range):  Temp:  [98.2 °F (36.8 °C)-103 °F (39.4 °C)] 99.4 °F (37.4 °C)  Pulse:  [80-93] 93  Resp:  [18-24] 24  SpO2:  [92 %-97 %] 92 %  BP: ()/(0-86) 54/0     Weight: 73.5 kg (162 lb 0.6 oz)  Body mass index is 23.93 kg/m².    Estimated Creatinine Clearance: 28.3 mL/min (A) (based on SCr of 2.5 mg/dL (H)).    Physical Exam   Constitutional: He is oriented to person, place, and time. He appears well-developed and well-nourished. No distress.       HENT:   Mouth/Throat: Oropharynx is clear and moist.   Eyes: Pupils are equal, round, and reactive to light.   Neck: Neck supple.   Cardiovascular:   Normal VAD hum   tachy   Pulmonary/Chest: Effort normal.   tachypneic   Abdominal: Soft. He exhibits no distension and no mass. There is no tenderness. There is no guarding.   Genitourinary:   Genitourinary Comments: Not examined today   Musculoskeletal: He exhibits no edema.   Neurological: He is alert and oriented to person, place, and time. No cranial nerve deficit.   Skin: Skin is warm and dry.   Psychiatric: He has a normal mood and affect. His behavior is normal.       Significant Labs:    Blood Culture:   Recent Labs   Lab 11/24/18  1215 11/24/18  1220 11/27/18  0446 11/27/18  0449 11/29/18  0601   LABBLOO Gram stain aer bottle: Gram positive cocci in clusters resembling Staph  Gram stain clement bottle: Gram positive cocci in clusters resembling Staph   Results called to and read back by: Trinity Forbes RN 11/25/2018  06:35  METHICILLIN RESISTANT STAPHYLOCOCCUS AUREUS  ID consult required at [facility].   Gram stain aer bottle: Gram positive cocci in clusters resembling Staph   Results called to and read back by: Trinity Forbes RN 11/25/2018  06:35  METHICILLIN RESISTANT STAPHYLOCOCCUS AUREUS  ID consult required at [facility].  For susceptibility see order #[id number]   Gram stain aer bottle: Gram positive cocci in clusters resembling Staph   Results called to and read back by: Ethel Miranda RN 11/28/2018  10:52  STAPHYLOCOCCUS AUREUS  Susceptibility pending  ID consult required at [facility].   Gram stain aer bottle: Gram positive cocci in clusters resembling Staph  Positive results previously called  STAPHYLOCOCCUS AUREUS  ID consult required at [facility].  For susceptibility see order #[id number]   No Growth to date  No Growth to date  No Growth to date  No Growth to date     CBC:   Recent Labs   Lab 11/29/18 0442 11/30/18  0500 11/30/18  0855   WBC 18.33*  18.33* 44.05*  44.05* 43.37*   HGB 8.5*  8.5* 9.9*  9.9* 9.4*   HCT 27.5*  27.5* 32.0*  32.0* 30.3*     242 374*  374* 344     CMP:   Recent Labs   Lab 11/29/18  0442 11/30/18  0500 11/30/18  0855   * 131* 132*   K 4.1 4.6 4.5    103 103   CO2 25 19* 22*    158* 181*   BUN 18 30* 34*   CREATININE 0.9 2.3* 2.5*   CALCIUM 8.2* 8.0* 7.9*   PROT  --  6.0  --    ALBUMIN  --  2.2*  --    BILITOT  --  2.3*  --    ALKPHOS  --  105  --     AST  --  106*  --    ALT  --  82*  --    ANIONGAP 5* 9 7*   EGFRNONAA >60.0 28.1* 25.4*     Wound Culture:   Recent Labs   Lab 11/23/18  1448   LABAERO METHICILLIN RESISTANT STAPHYLOCOCCUS AUREUS  Moderate       All pertinent labs within the past 24 hours have been reviewed.    Significant Imaging: I have reviewed all pertinent imaging results/findings within the past 24 hours.   X-Ray Chest 1 View [[id number]] Resulted: 11/25/18 1327   Order Status: Completed Updated: 11/25/18 1329   Narrative:     EXAMINATION:  XR CHEST 1 VIEW    CLINICAL HISTORY:  line placement;    TECHNIQUE:  Single frontal view of the chest was performed.    COMPARISON:  Multiple priors, most recent 11/23/2018    FINDINGS:  Interval placement of a right internal jugular approach central venous catheter terminating at the expected location of the superior cavoatrial junction.  Stable positioning of cardiac pacemaker leads.  LVAD is in place.  Stable cardiomegaly.  Median sternotomy wires are intact aligned.  Small left pleural effusion with adjacent atelectasis.  No pneumothorax.   Impression:       Right IJ approach central venous catheter adequate position.  No pneumothorax.    Otherwise stable exam.      Electronically signed by: Lori Galicia  Date: 11/25/2018  Time: 13:27   CT Abdomen Pelvis Without Contrast [[id number]] Resulted: 11/23/18 1905   Order Status: Completed Updated: 11/23/18 1907   Narrative:     EXAMINATION:  CT ABDOMEN PELVIS WITHOUT CONTRAST    CLINICAL HISTORY:  driveline infection    TECHNIQUE:  Low dose axial images, sagittal and coronal reformations were obtained from the lung bases to the pubic synthesis following the oral administration of 30 mL of Omnipaque 350.  Intravenous contrast was not administered.    COMPARISON:  CT chest abdomen pelvis 05/09/2018    FINDINGS:  Bilateral gynecomastia.    Postoperative changes from LVAD placement.  New small volume of fluid identified at the inferior aspect of the LVAD.  New  small volume pericardial fluid.  No inflammatory changes or fluid collection noted along the course of the drive line within the ventral abdominal wall.  Partially visualized pacemaker wires.  Calcification of the aortic annulus.  Calcification within the left ventricular wall similar to prior exam.  Coronary artery atherosclerosis.    Small left pleural effusion with associated compressive atelectasis.  Right lung base is clear.    Evaluation of upper abdomen is limited secondary to artifact from LVAD.  Liver is normal in size and attenuation without focal hepatic lesion.  There is gallbladder sludge.  The spleen, adrenal glands, and pancreas are unremarkable.    Kidneys are normal in size and location.  No hydronephrosis or ureteral dilatation.  No renal stone.  Urinary bladder is unremarkable.    Stomach is unremarkable.  Small and large bowel are normal in caliber without evidence of obstruction.  Rectum is distended with fecal material and there is possible mild rectal wall thickening.    No retroperitoneal or mesenteric adenopathy.  No free intraperitoneal fluid or air.    Calcific atherosclerosis of the abdominal aorta and its branch vessels.    Body wall edema.    Sternotomy wires.  Degenerative changes of the spine.  No acute fracture.   Impression:       1. New small volume fluid identified at the inferior aspect of the LVAD.  Finding is nonspecific however may represent an infection source given clinical concern.  2. New small volume pericardial fluid.  3. Small left pleural effusion.  4. Body wall edema.  5. Additional findings as above.    Electronically signed by resident: Roland Schmitt  Date: 11/23/2018  Time: 18:33    Electronically signed by: Anuj Gomez MD  Date: 11/23/2018  Time: 19:05   X-Ray Chest AP Portable [[id number]] Resulted: 11/23/18 1754   Order Status: Completed Updated: 11/23/18 1757   Narrative:     EXAMINATION:  XR CHEST AP PORTABLE    CLINICAL  HISTORY:  Sepsis;    TECHNIQUE:  Single frontal view of the chest was performed.    COMPARISON:  10/24/2018    FINDINGS:  Left-sided cardiac defibrillator noted.  LVAD noted.  There is new small left pleural effusion with left basilar atelectasis or consolidation.  There is no pneumothorax.  Cardiac silhouette is enlarged.   Impression:       As above.      Electronically signed by: Anuj Gomez MD  Date: 11/23/2018  Time: 17:54

## 2018-11-30 NOTE — PLAN OF CARE
Problem: Patient Care Overview  Goal: Plan of Care Review  Outcome: Revised  Plan of care discussed with patient and wife.  Patient is free of fall/trauma/injury. Denies CP, SOB, or pain/discomfort. Dopplers low at 58-60. NS at 75ml/hr iv given x 4 hrs.  LVAD dressing changed on day shift per wife. Now on daptomycin IVPB and Vanco d/lisa.  All questions addressed. Will continue to monitor

## 2018-11-30 NOTE — PLAN OF CARE
Problem: Physical Therapy Goal  Goal: Physical Therapy Goal  Goals to be met by: 18     Patient will increase functional independence with mobility by performin. Supine to sit with [place] -not met  2. Sit to supine with [place] -not met  3. Sit to stand transfer with Stand-by Assistance-not met  4. Bed to chair transfer with Stand-by Assistance using no AD -not met  5. Gait  x 150 feet with Minimal Assistance using no AD -not met  6. Stand for 5 minutes with Stand-by Assistance using no AD -not met     Goals remain appropriate. Astrid Whaley, PT 2018

## 2018-11-30 NOTE — ASSESSMENT & PLAN NOTE
-May be secondary to pt being NPO all day yesterday vs hypotension during GLENN?  -Hold ACEI and will give IV hydration. Will recheck bmp this afternoon.

## 2018-11-30 NOTE — NURSING
Notified Kishore Paz of the followin. Pt has not urinated since 8 am.   2. Lactate: 4.4.   3. Doppler: 48   Orders to check a CVP and to move pt to the unit.

## 2018-11-30 NOTE — ANESTHESIA POSTPROCEDURE EVALUATION
"Anesthesia Post Evaluation    Patient: Suman Hayden    Procedure(s) Performed: Procedure(s) (LRB):  ECHOCARDIOGRAM,TRANSESOPHAGEAL (N/A)    Final Anesthesia Type: general  Patient location during evaluation: telemetry step down  Patient participation: Yes- Able to Participate  Level of consciousness: awake and alert  Post-procedure vital signs: reviewed and stable  Pain management: adequate  Airway patency: patent  PONV status at discharge: No PONV  Anesthetic complications: no      Cardiovascular status: hemodynamically stable  Respiratory status: unassisted, spontaneous ventilation and room air  Hydration status: euvolemic          Visit Vitals  BP (!) 54/0 (BP Location: Right arm, Patient Position: Lying)   Pulse 90   Temp 37.5 °C (99.5 °F) (Oral)   Resp (!) 26   Ht 5' 9" (1.753 m)   Wt 73.5 kg (162 lb 0.6 oz)   SpO2 95%   BMI 23.93 kg/m²       Pain/Gurpreet Score: Pain Assessment Performed: Yes (11/30/2018  2:00 AM)  Presence of Pain: denies (11/30/2018  2:00 AM)  Pain Rating Prior to Med Admin: 0 (11/29/2018 11:41 AM)        "

## 2018-12-01 PROBLEM — J90 LARGE PLEURAL EFFUSION: Status: ACTIVE | Noted: 2018-01-01

## 2018-12-01 NOTE — CONSULTS
[facility]  Critical Care Medicine  Consult Note    Patient Name: Suman Hayden  MRN: [id number]  Admission Date: 11/23/2018  Hospital Length of Stay: 8 days  Code Status: Full Code  Attending Physician: Angie Torres MD   Primary Care Provider: Joe Ernst MD   Principal Problem: Severe sepsis    Inpatient consult to Pulmonology  Consult performed by: Annamaria Avalos MD  Consult ordered by: Kishore Paz NP        Subjective:     HPI:  Mr. Hayden is a 67 yo AAM with a PMH NICM (EF 20%) s/p LVAD placement 07/2017 on Coumadin, and VT on amiodarone s/p ICD (with revision in 11/2017 complicated by pocket hematoma and prior hospitalization for ICD shocks secondary to atrial tachycardia with abberancy).    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 Patient spiked a fever of 102 and antibiotics were started (Vanc and Cefepime). CT chest/abd/pel was completed which showed new small volume fluid identified at the inferior aspect of the LVAD, a new small volume pericardial effusion, small left pleural effusion, and body wall edema. DLES and blood cultures came back (+) for MRSA [(+)11/27, (-)11/29]. 2D ECHO (-) for vegetations. Patient developed INDIRA thought to be 2/2 to ATN. On 11/28, patient started to require 1L O2 - was on room air throughout admission prior to this. On 11/29, patient had GLENN performed which showed ragged fibrous deposits along lead in right atrium, likely right ventricular lead, with filimentous mobile echodensity measuring 7mm. On 11/30, patient was moved to the ICU due to minimal UOP, worsening INDIRA, rising lactate of 4.4, and low blood pressures. Repeat CT chest/abd/pel completed which showed large volume L pleural effusion. Daptomycin and Ceftaroline were both started. UOP has dropped significantly over the last 24 hours, and gradually since  the 27th. BNP on this admission was >1000, up to 1873 (usually below 1000).    To note, patient has a recent admission from 10/24/18 - 11/2/18 for GI bleed. He has a history of ileal ulcer s/p intervention with APC using balloon enteroscopy in 2017 followed by hospitalization in January 2018 and March 2018 where he underwent a partial small bowel resection due to recurrent bleeding.    Hospital/ICU Course:  No notes on file    Past Medical History:   Diagnosis Date    Arthritis     Cardiomyopathy     CHF (congestive heart failure)     Coronary artery disease     Encounter for blood transfusion     Gastric polyp     Hyperlipidemia     Hypertension     Hypotension, iatrogenic     Iron deficiency anemia due to chronic blood loss 10/15/2018    LVAD (left ventricular assist device) present     Obesity     S/P implantation of automatic cardioverter/defibrillator (AICD)     Ventricular tachycardia      Past Surgical History:   Procedure Laterality Date    ABDOMINAL SURGERY      APPENDECTOMY      CARDIAC CATHETERIZATION      CARDIAC DEFIBRILLATOR PLACEMENT      CARDIAC DEFIBRILLATOR PLACEMENT      CLOSURE-STERNAL WOUND N/A 7/28/2017    Performed by [facility]ing MD at [facility] OR 2ND FLR    COLONOSCOPY      COLONOSCOPY N/A 3/9/2018    Procedure: COLONOSCOPY;  Surgeon: Andres Chatterjee MD;  Location: [facility] (2ND FLR);  Service: Endoscopy;  Laterality: N/A;    COLONOSCOPY N/A 8/8/2018    Procedure: COLONOSCOPY;  Surgeon: Andres Chatterjee MD;  Location: [facility] (2ND FLR);  Service: Endoscopy;  Laterality: N/A;    COLONOSCOPY N/A 8/8/2018    Performed by Andres Chat[facility]jee MD at [facility] ENDO (2ND FLR)    COLONOSCOPY N/A 3/9/2018    Performed by Andres Chat[facility]jee MD at [facility] ENDO (2ND FLR)    DEVICE ASSISTED ENTEROSCOPY-ANTEROGRADE N/A 1/25/2018    Performed by Andres Chat[facility]jee MD at [facility] ENDO (2ND FLR)    DEVICE ASSISTED ENTEROSCOPY-ANTEROGRADE N/A 12/14/2017    Performed by Andres Chatterjee MD at [facility]  ENDO (2ND FLR)    EGD (ESOPHAGOGASTRODUODENOSCOPY) N/A 10/26/2018    Performed by Jessica Casillas MD at [facility] ENDO (2ND FLR)    EGD (ESOPHAGOGASTRODUODENOSCOPY) N/A 8/8/2018    Performed by Andres Chatterjee MD at [facility] ENDO (2ND FLR)    ESOPHAGOGASTRODUODENOSCOPY      ESOPHAGOGASTRODUODENOSCOPY N/A 8/8/2018    Procedure: EGD (ESOPHAGOGASTRODUODENOSCOPY);  Surgeon: Andres Chatterjee MD;  Location: [facility] ENDO (2ND FLR);  Service: Endoscopy;  Laterality: N/A;    ESOPHAGOGASTRODUODENOSCOPY N/A 10/26/2018    Procedure: EGD (ESOPHAGOGASTRODUODENOSCOPY);  Surgeon: Jessica Casillas MD;  Location: [facility] ENDO (2ND FLR);  Service: Endoscopy;  Laterality: N/A;  Push endoscopy    ESOPHAGOGASTRODUODENOSCOPY (EGD) N/A 3/6/2018    Performed by Andres Chatterjee MD at [facility] ENDO (2ND FLR)    ESOPHAGOGASTRODUODENOSCOPY (EGD) N/A 12/8/2017    Performed by Gagan Barger MD at [facility] ENDO (2ND FLR)    ESOPHAGOGASTRODUODENOSCOPY (EGD) N/A 8/17/2017    Performed by Kishore Mills MD at [facility] (2ND FLR)    EXPLORATORY-LAPAROTOMY with small bowel resection  N/A 3/13/2018    [facility]    HEART CATH-RIGHT Right 7/3/2017    Performed by Angie Torres MD at [facility] CATH LAB    INSERTION-LEFT VENTRICULAR ASSIST DEVICE N/A 7/27/2017    Performed by Sunny Donwing MD at [facility] OR 2ND FLR    INSERTION-LEFT VENTRICULAR ASSIST DEVICE N/A 7/25/2017    Performed by Sunny Downing MD at [facility] OR 2ND FLR    LEFT VENTRICULAR ASSIST DEVICE  07/27/2017    PLACEMENT-NEOPERICARDIUM N/A 7/28/2017    Performed by Sunny Downing MD at [facility] OR 2ND FLR    RESECTION-BOWEL  3/13/2018    Performed by Kenyon Tellez MD at [facility] OR 2ND FLR    Retrograde deivce assisted enteroscopy N/A 1/26/2018    Performed by Jesse Davis MD at [facility] ENDO (2ND FLR)    REVISION-LEAD-ICD N/A 11/20/2017    Performed by Rubén Winchester MD at [facility] CATH LAB    TONSILLECTOMY      TRANSESOPHAGEAL ECHOCARDIOGRAM  (GLENN) N/A 1/9/2018    Performed by [facility] Diagnostic Provider at [facility] CATH LAB     Review of patient's allergies indicates:   Allergen Reactions    Asa [aspirin] Other (See Comments)     Bleeding ulcer     Family History     Problem Relation (Age of Onset)    Heart disease Mother, Father        Tobacco Use    Smoking status: Never Smoker    Smokeless tobacco: Never Used   Substance and Sexual Activity    Alcohol use: No    Drug use: No    Sexual activity: Not Currently      Review of Systems   Constitutional: Negative for chills, fatigue and fever.   HENT: Negative for congestion, postnasal drip, rhinorrhea, sinus pressure, sinus pain and sore throat.    Eyes: Negative for pain and redness.   Respiratory: Negative for shortness of breath.    Cardiovascular: Negative for chest pain and leg swelling.   Gastrointestinal: Negative for abdominal pain, diarrhea, nausea and vomiting.   Musculoskeletal: Negative for arthralgias and myalgias.   Neurological: Negative for dizziness and headaches.     Objective:     Vital Signs (Most Recent):  Temp: 98.2 °F (36.8 °C) (12/01/18 1100)  Pulse: 99 (12/01/18 1300)  Resp: (!) 27 (12/01/18 1300)  BP: (!) 72/0 (12/01/18 1124)  SpO2: 96 % (12/01/18 1100) Vital Signs (24h Range):  Temp:  [98 °F (36.7 °C)-99.9 °F (37.7 °C)] 98.2 °F (36.8 °C)  Pulse:  [] 99  Resp:  [25-44] 27  SpO2:  [93 %-98 %] 96 %  BP: ()/(0-123) 72/0  Arterial Line BP: (66-70)/(54-56) 66/54   Weight: 76.6 kg (168 lb 14 oz)  Body mass index is 24.94 kg/m².    Intake/Output Summary (Last 24 hours) at 12/1/2018 1343  Last data filed at 12/1/2018 1300  Gross per 24 hour   Intake 998.24 ml   Output 220 ml   Net 778.24 ml     Physical Exam   Constitutional: He is oriented to person, place, and time. He appears well-developed and well-nourished. No distress.   HENT:   Head: Normocephalic and atraumatic.   Nose: Nose normal.   Mouth/Throat: No oropharyngeal exudate.   Eyes: EOM are normal. Pupils are equal,  round, and reactive to light.   Neck: Normal range of motion. Neck supple.   Cardiovascular: Normal rate, regular rhythm and normal heart sounds.   Pulmonary/Chest: Effort normal and breath sounds normal.   Decreased breath sounds at L base.   Abdominal: Soft. Bowel sounds are normal. He exhibits no distension. There is no tenderness.   Musculoskeletal: Normal range of motion. He exhibits no edema.   Neurological: He is alert and oriented to person, place, and time.   Skin: Skin is warm and dry. No rash noted. No erythema.   Psychiatric: He has a normal mood and affect. His behavior is normal.   Nursing note and vitals reviewed.    Lines/Drains/Airways     Central Venous Catheter Line                 Percutaneous Central Line Insertion/Assessment - triple lumen  11/25/18 1256 right internal jugular 6 days          Arterial Line                 Arterial Line 12/01/18 1115 Right Radial less than 1 day          Line                 VAD 07/27/17 1312 Left ventricular assist device HeartMate 3 492 days              Significant Labs:    CBC/Anemia Profile:  Recent Labs   Lab 11/30/18  0500 11/30/18  0855 12/01/18  0416   WBC 44.05*  44.05* 43.37* 52.45*  52.45*   HGB 9.9*  9.9* 9.4* 8.5*  8.5*   HCT 32.0*  32.0* 30.3* 27.2*  27.2*   *  374* 344 331  331   MCV 74*  74* 74* 73*  73*   RDW 19.9*  19.9* 20.0* 20.3*  20.3*     Chemistries:  Recent Labs   Lab 11/30/18  0500  11/30/18  1511 11/30/18  2000 12/01/18  0416   *   < > 132* 128* 131*   K 4.6   < > 4.7 5.2* 5.0      < > 102 104 102   CO2 19*   < > 20* 13* 19*   BUN 30*   < > 39* 41* 48*   CREATININE 2.3*   < > 2.9* 2.9* 3.3*   CALCIUM 8.0*   < > 8.0* 7.8* 7.8*   ALBUMIN 2.2*  --   --   --   --    PROT 6.0  --   --   --   --    BILITOT 2.3*  --   --   --   --    ALKPHOS 105  --   --   --   --    ALT 82*  --   --   --   --    *  --   --   --   --    MG 2.2  2.2  --   --   --  2.2  2.2   PHOS 2.1*  2.1*  --   --   --  2.4*  2.4*     < > = values in this interval not displayed.     All pertinent labs within the past 24 hours have been reviewed.    Significant Imaging: I have reviewed and interpreted all pertinent imaging results/findings within the past 24 hours.    Assessment/Plan:     Large pleural effusion    This is a case of large L-sided pleural effusion in the setting of septic shock 2/2 to DLES infection and PPM lead vegetation, as well as HFrEF 15-20%, oliguric renal failure, and supratherapeutic INR. Patient is currently on Daptomycin and Ceftaroline with ID following. He is on Levophed 0.06 for shock. Patient has only made 100 cc urine today likely due to ATN from hypotension and is likely going to need dialysis. With this said, patient's BNP is extremely elevated (more elevated than his baseline even before he developed renal failure). His L pleural effusion likely represents volume overload in the setting of severe heart failure on acute renal failure - would suggest Lasix challenge to see if we can improve his urine output. Given patient's severe infection and continued pressor requirements, sampling the fluid to rule out infection is a good idea, but very risky with an INR of 3.9.    After speaking with the primary team, will wait to perform thoracentesis vs small sampling of fluid with thin needle until INR comes down. Will continue to monitor INR. Oxygen requirements are low at 2L NC without shortness of breath or tachypnea - no urgent indication for thoracentesis.       Thank you for involving us in the care of this patient. We will continue to follow along. Please call with any questions.    Annamaria Avalos MD  [employer] P[facility] Fellow, PGY 5  [facility]  Pager: [phone]

## 2018-12-01 NOTE — ASSESSMENT & PLAN NOTE
-May be secondary to hypotension during GLENN?  -Holding ACEI. CVP 8-10. Hold off on diuretics for now.

## 2018-12-01 NOTE — SUBJECTIVE & OBJECTIVE
Past Medical History:   Diagnosis Date    Arthritis     Cardiomyopathy     CHF (congestive heart failure)     Coronary artery disease     Encounter for blood transfusion     Gastric polyp     Hyperlipidemia     Hypertension     Hypotension, iatrogenic     Iron deficiency anemia due to chronic blood loss 10/15/2018    LVAD (left ventricular assist device) present     Obesity     S/P implantation of automatic cardioverter/defibrillator (AICD)     Ventricular tachycardia      Past Surgical History:   Procedure Laterality Date    ABDOMINAL SURGERY      APPENDECTOMY      CARDIAC CATHETERIZATION      CARDIAC DEFIBRILLATOR PLACEMENT      CARDIAC DEFIBRILLATOR PLACEMENT      CLOSURE-STERNAL WOUND N/A 7/28/2017    Performed by Sunny Downing MD at [facility] OR 2ND FLR    COLONOSCOPY      COLONOSCOPY N/A 3/9/2018    Procedure: COLONOSCOPY;  Surgeon: Andres Chatterjee MD;  Location: [facility] (2ND FLR);  Service: Endoscopy;  Laterality: N/A;    COLONOSCOPY N/A 8/8/2018    Procedure: COLONOSCOPY;  Surgeon: Andres Chatterjee MD;  Location: [facility] (2ND FLR);  Service: Endoscopy;  Laterality: N/A;    COLONOSCOPY N/A 8/8/2018    Performed by Andres Chatterjee MD at [facility] ENDO (2ND FLR)    COLONOSCOPY N/A 3/9/2018    Performed by Andres Chatterjee MD at [facility] (2ND FLR)    DEVICE ASSISTED ENTEROSCOPY-ANTEROGRADE N/A 1/25/2018    Performed by Andres Chatterjee MD at [facility] ENDO (2ND FLR)    DEVICE ASSISTED ENTEROSCOPY-ANTEROGRADE N/A 12/14/2017    Performed by Andres Chatterjee MD at [facility] (2ND FLR)    EGD (ESOPHAGOGASTRODUODENOSCOPY) N/A 10/26/2018    Performed by Jessica Casillas MD at [facility] ENDO (2ND FLR)    EGD (ESOPHAGOGASTRODUODENOSCOPY) N/A 8/8/2018    Performed by Andres Chatterjee MD at [facility] (2ND FLR)    ESOPHAGOGASTRODUODENOSCOPY      ESOPHAGOGASTRODUODENOSCOPY N/A 8/8/2018    Procedure: EGD (ESOPHAGOGASTRODUODENOSCOPY);  Surgeon: Andres Chatterjee MD;  Location: [facility] (2ND FLR);  Service:  Endoscopy;  Laterality: N/A;    ESOPHAGOGASTRODUODENOSCOPY N/A 10/26/2018    Procedure: EGD (ESOPHAGOGASTRODUODENOSCOPY);  Surgeon: Jessica Casillas MD;  Location: [facility] (2ND FLR);  Service: Endoscopy;  Laterality: N/A;  Push endoscopy    ESOPHAGOGASTRODUODENOSCOPY (EGD) N/A 3/6/2018    Performed by Andres Chatterjee MD at [facility] ENDO (2ND FLR)    ESOPHAGOGASTRODUODENOSCOPY (EGD) N/A 12/8/2017    Performed by Gagan Barger MD at [facility] ENDO (2ND FLR)    ESOPHAGOGASTRODUODENOSCOPY (EGD) N/A 8/17/2017    Performed by Kishore Mills MD at [facility] (2ND FLR)    EXPLORATORY-LAPAROTOMY with small bowel resection  N/A 3/13/2018    Performed by Kenyon Tellez MD at [facility] OR 2ND FLR    HEART CATH-RIGHT Right 7/3/2017    Performed by Angie Torres MD at [facility] CATH LAB    INSERTION-LEFT VENTRICULAR ASSIST DEVICE N/A 7/27/2017    Performed by Sunny Downing MD at [facility] OR 2ND FLR    INSERTION-LEFT VENTRICULAR ASSIST DEVICE N/A 7/25/2017    Performed by Sunny Downing MD at [facility] OR 2ND FLR    LEFT VENTRICULAR ASSIST DEVICE  07/27/2017    PLACEMENT-NEOPERICARDIUM N/A 7/28/2017    Performed by Sunny Downing MD at [facility] OR 2ND FLR    RESECTION-BOWEL  3/13/2018    Performed by Kenyon Tellez MD at [facility] OR [facility]    Retrograde deivce assisted enteroscopy N/A 1/26/2018    Performed by Jesse Davis MD at [facility] (2ND FLR)    REVISION-LEAD-ICD N/A 11/20/2017    Performed by Rubén Winchester MD at [facility] CATH LAB    TONSILLECTOMY      TRANSESOPHAGEAL ECHOCARDIOGRAM (GLENN) N/A 1/9/2018    Performed by [facility] Diagnostic Provider at [facility] CATH LAB     Review of patient's allergies indicates:   Allergen Reactions    Asa [aspirin] Other (See Comments)     Bleeding ulcer     Family History     Problem Relation (Age of Onset)    Heart disease Mother, Father        Tobacco Use    Smoking status: Never Smoker    Smokeless tobacco: Never Used   Substance and Sexual Activity    Alcohol use: No    Drug  use: No    Sexual activity: Not Currently      Review of Systems   Constitutional: Negative for chills, fatigue and fever.   HENT: Negative for congestion, postnasal drip, rhinorrhea, sinus pressure, sinus pain and sore throat.    Eyes: Negative for pain and redness.   Respiratory: Negative for shortness of breath.    Cardiovascular: Negative for chest pain and leg swelling.   Gastrointestinal: Negative for abdominal pain, diarrhea, nausea and vomiting.   Musculoskeletal: Negative for arthralgias and myalgias.   Neurological: Negative for dizziness and headaches.     Objective:     Vital Signs (Most Recent):  Temp: 98.2 °F (36.8 °C) (12/01/18 1100)  Pulse: 99 (12/01/18 1300)  Resp: (!) 27 (12/01/18 1300)  BP: (!) 72/0 (12/01/18 1124)  SpO2: 96 % (12/01/18 1100) Vital Signs (24h Range):  Temp:  [98 °F (36.7 °C)-99.9 °F (37.7 °C)] 98.2 °F (36.8 °C)  Pulse:  [] 99  Resp:  [25-44] 27  SpO2:  [93 %-98 %] 96 %  BP: ()/(0-123) 72/0  Arterial Line BP: (66-70)/(54-56) 66/54   Weight: 76.6 kg (168 lb 14 oz)  Body mass index is 24.94 kg/m².    Intake/Output Summary (Last 24 hours) at 12/1/2018 1343  Last data filed at 12/1/2018 1300  Gross per 24 hour   Intake 998.24 ml   Output 220 ml   Net 778.24 ml     Physical Exam   Constitutional: He is oriented to person, place, and time. He appears well-developed and well-nourished. No distress.   HENT:   Head: Normocephalic and atraumatic.   Nose: Nose normal.   Mouth/Throat: No oropharyngeal exudate.   Eyes: EOM are normal. Pupils are equal, round, and reactive to light.   Neck: Normal range of motion. Neck supple.   Cardiovascular: Normal rate, regular rhythm and normal heart sounds.   Pulmonary/Chest: Effort normal and breath sounds normal.   Decreased breath sounds at L base.   Abdominal: Soft. Bowel sounds are normal. He exhibits no distension. There is no tenderness.   Musculoskeletal: Normal range of motion. He exhibits no edema.   Neurological: He is alert and  oriented to person, place, and time.   Skin: Skin is warm and dry. No rash noted. No erythema.   Psychiatric: He has a normal mood and affect. His behavior is normal.   Nursing note and vitals reviewed.    Lines/Drains/Airways     Central Venous Catheter Line                 Percutaneous Central Line Insertion/Assessment - triple lumen  11/25/18 1256 right internal jugular 6 days          Arterial Line                 Arterial Line 12/01/18 1115 Right Radial less than 1 day          Line                 VAD 07/27/17 1312 Left ventricular assist device HeartMate 3 492 days              Significant Labs:    CBC/Anemia Profile:  Recent Labs   Lab 11/30/18  0500 11/30/18  0855 12/01/18  0416   WBC 44.05*  44.05* 43.37* 52.45*  52.45*   HGB 9.9*  9.9* 9.4* 8.5*  8.5*   HCT 32.0*  32.0* 30.3* 27.2*  27.2*   *  374* 344 331  331   MCV 74*  74* 74* 73*  73*   RDW 19.9*  19.9* 20.0* 20.3*  20.3*     Chemistries:  Recent Labs   Lab 11/30/18  0500  11/30/18  1511 11/30/18  2000 12/01/18  0416   *   < > 132* 128* 131*   K 4.6   < > 4.7 5.2* 5.0      < > 102 104 102   CO2 19*   < > 20* 13* 19*   BUN 30*   < > 39* 41* 48*   CREATININE 2.3*   < > 2.9* 2.9* 3.3*   CALCIUM 8.0*   < > 8.0* 7.8* 7.8*   ALBUMIN 2.2*  --   --   --   --    PROT 6.0  --   --   --   --    BILITOT 2.3*  --   --   --   --    ALKPHOS 105  --   --   --   --    ALT 82*  --   --   --   --    *  --   --   --   --    MG 2.2  2.2  --   --   --  2.2  2.2   PHOS 2.1*  2.1*  --   --   --  2.4*  2.4*    < > = values in this interval not displayed.     All pertinent labs within the past 24 hours have been reviewed.    Significant Imaging: I have reviewed and interpreted all pertinent imaging results/findings within the past 24 hours.

## 2018-12-01 NOTE — ASSESSMENT & PLAN NOTE
This is a case of large L-sided pleural effusion in the setting of septic shock 2/2 to DLES infection and PPM lead vegetation, as well as HFrEF 15-20%, oliguric renal failure, and supratherapeutic INR. Patient is currently on Daptomycin and Ceftaroline with ID following. He is on Levophed 0.06 for shock. Patient has only made 100 cc urine today likely due to ATN from hypotension and is likely going to need dialysis. With this said, patient's BNP is extremely elevated (more elevated than his baseline even before he developed renal failure). His L pleural effusion likely represents volume overload in the setting of severe heart failure on acute renal failure - would suggest Lasix challenge to see if we can improve his urine output. Given patient's severe infection and continued pressor requirements, sampling the fluid to rule out infection is a good idea, but very risky with an INR of 3.9.    After speaking with the primary team, will wait to perform thoracentesis vs small sampling of fluid with thin needle until INR comes down. Will continue to monitor INR. Oxygen requirements are low at 2L NC without shortness of breath or tachypnea - no urgent indication for thoracentesis.

## 2018-12-01 NOTE — PROCEDURES
Procedure: Right Radial Arterial Line Placement with Real Time Sonographic Guidance    Indications: Hemodynamic Monitoring, Arterial Blood Sampling    Consent: Risk and benefits of the intervention were explained to the patient and his wife who was present in the room. Patient agree with the procedure. Consent paper form signed and placed on chart.    Procedure Description:    After proper consent from the patient was obtained, the right wrist was prepared in the usual sterile fashion and cleaned with chlorhexidine solution and covered with sterile towel. The right radial artery was visualized with real time sonography. The area to be approached was infiltrated with 3 cc of 1% lidocaine for patient comfort. A 20 gauge Arrow Radial Artery Catheterization Set was used for cannulation of the right radial artery under sonographic guidance. The artery was cannulated on the first attempt and the guidewire was  advanced with no resistance. The catheter was advanced over the wire with adequate arterial blood flow return noted thru the catheter. The catheter was connected to the transducer system and a  adequate waveform pattern (intermittent pulsatility) was noted. The line was secured in the skin with sutures and covered with the appropriate dressing techniques. MAP noted at 60-65 mmHg.    Blood Loss: 1cc     Complications: None

## 2018-12-01 NOTE — ASSESSMENT & PLAN NOTE
-HM3 5200 rpm, DT.  -Not on home diuretics. CVP 8-10.  -INR therapeutic. Holding coumadin.   -ASA stopped on 12/22/17 after GI bleed and ileal ulcer   -LDH stable  -BP wnl. Placing A-line as above.     Procedure: Device Interrogation Including analysis of device parameters  Current Settings: Ventricular Assist Device  Review of device function is stable/unstable stable    TXP LVAD INTERROGATIONS 12/1/2018 12/1/2018 12/1/2018 12/1/2018 12/1/2018 12/1/2018 12/1/2018   Type HeartMate3 HeartMate3 HeartMate3 HeartMate3 HeartMate3 HeartMate3 HeartMate3   Flow 3.9 4.5 4 3.9 3.6 3.9 3.8   Speed 5150 4800 5200 5200 5200 5200 5200   PI 3.8 3.5 8 7.8 8.5 7.7 8.7   Power ([place]) 3.4 3.5 3.6 3.6 3.7 3.6 3.6   LSL - - - - - - -   Pulsatility - Intermittent pulse - Intermittent pulse Intermittent pulse Intermittent pulse Intermittent pulse

## 2018-12-01 NOTE — NURSING TRANSFER
Nursing Transfer Note      11/30/2018     Transfer From: [id number]    Transfer via bed    Transfer with cardiac monitoring, O2, LVAD on batteries    Transported by RN    Medicines sent: none    Chart send with patient: Yes    Notified: spouse, at bedside    Upon arrival to floor: cardiac monitor applied, patient oriented to room, call bell in reach and bed in lowest position

## 2018-12-01 NOTE — PLAN OF CARE
Problem: Patient Care Overview  Goal: Plan of Care Review  Outcome: Ongoing (interventions implemented as appropriate)    No acute events throughout day. See vital signs and assessments in flowsheets. See below for updates on today's progress.     Pulmonary: remains on 2L NC; SpO2 very difficult to obtain r/t pulsatility; no complaints of SOB; diminished BS    Cardiovascular: HM3 in place - speed 5200, flows 3.9-4.5, PI events noted in history - MD Ambrosio made aware; SR 80s; arterial line placed for more accurate BP - MAP >60; doppler pressures 70-80 intermittently pulsatile; CVP 10-13    Neurological: oriented x4, but drowsy most of shift - easily arousable and follows all commands; afebrile; moves all extremities spontaneously    Gastrointestinal: tolerating diet well and educated on 2L FR; no BM this shift    Genitourinary: voids spontaneously per urinal with ~100mL each void of coby urine    Integumentary/Other: VAD dressing CDI - changed daily per spouse; LUE and BLE edema present    Infusions: Levo infusing for MAP >60    Patient progressing towards goals as tolerated, plan of care communicated and reviewed with Suman Hayden and family. All concerns addressed. Will continue to monitor.

## 2018-12-01 NOTE — SUBJECTIVE & OBJECTIVE
Interval History: Moved to ICU yesterday afternoon for worsening renal fxn/sepsis. Feeling a bit better this am. No complaints. Wife at bedside.     Scheduled Meds:   amiodarone  400 mg Oral BID    ceftaroline fosamil  300 mg Intravenous Q8H    DAPTOmycin (CUBICIN)  IV  10 mg/kg Intravenous Q48H    dronabinol  5 mg Oral BID    ferrous sulfate  325 mg Oral TID    mupirocin   Topical (Top) Daily    oxyCODONE-acetaminophen  1 tablet Oral Once    pantoprazole  40 mg Oral BID     PRN Meds:acetaminophen, polyethylene glycol, polyethylene glycol, traMADol    Review of patient's allergies indicates:   Allergen Reactions    Asa [aspirin] Other (See Comments)     Bleeding ulcer     Objective:     Vital Signs (Most Recent):  Temp: 98 °F (36.7 °C) (12/01/18 0800)  Pulse: 102 (12/01/18 0900)  Resp: (!) 36 (12/01/18 0900)  BP: (!) 201/75 (12/01/18 0900)  SpO2: 95 % (12/01/18 0245) Vital Signs (24h Range):  Temp:  [98 °F (36.7 °C)-99.9 °F (37.7 °C)] 98 °F (36.7 °C)  Pulse:  [] 102  Resp:  [25-44] 36  SpO2:  [93 %-98 %] 95 %  BP: ()/(0-88) 201/75     Patient Vitals for the past 72 hrs (Last 3 readings):   Weight   11/30/18 1845 76.6 kg (168 lb 14 oz)   11/30/18 0400 73.5 kg (162 lb 0.6 oz)   11/29/18 0416 75.7 kg (166 lb 14.2 oz)     Body mass index is 24.94 kg/m².      Intake/Output Summary (Last 24 hours) at 12/1/2018 0918  Last data filed at 12/1/2018 0900  Gross per 24 hour   Intake 953 ml   Output 120 ml   Net 833 ml     CVP:  [8 mmHg] 8 mmHg  Physical Exam   Constitutional: He is oriented to person, place, and time. No distress.   HENT:   Mouth/Throat: Oropharynx is clear and moist.   Eyes: Pupils are equal, round, and reactive to light.   Neck: Neck supple.   Cardiovascular:   Normal VAD hum    Pulmonary/Chest: Effort normal.   Decreased BS on L side and in BLL   Abdominal: Soft. He exhibits no distension and no mass. There is no tenderness. There is no guarding.   Genitourinary:   Genitourinary  Comments: Three boils present on testicles.  2 of which were openly draining   Musculoskeletal: He exhibits no edema.   Neurological: He is alert and oriented to person, place, and time. No cranial nerve deficit.   Skin: Skin is warm and dry.   Psychiatric: He has a normal mood and affect. His behavior is normal.     Significant Labs:  CBC:  Recent Labs   Lab 11/30/18  0500 11/30/18  0855 12/01/18  0416   WBC 44.05*  44.05* 43.37* 52.45*  52.45*   RBC 4.31*  4.31* 4.08* 3.71*  3.71*   HGB 9.9*  9.9* 9.4* 8.5*  8.5*   HCT 32.0*  32.0* 30.3* 27.2*  27.2*   *  374* 344 331  331   MCV 74*  74* 74* 73*  73*   MCH 23.0*  23.0* 23.0* 22.9*  22.9*   MCHC 30.9*  30.9* 31.0* 31.3*  31.3*     BNP:  Recent Labs   Lab 11/26/18  0424 11/28/18  0301 11/30/18  0500   BNP 1,512* 1,430*  1,430* 1,005*     CMP:  Recent Labs   Lab 11/27/18  0409 11/28/18  0301  11/30/18  0500  11/30/18  1511 11/30/18  2000 12/01/18  0416   GLU 89 95   < > 158*   < > 172* 132* 113*   CALCIUM 8.0* 8.1*   < > 8.0*   < > 8.0* 7.8* 7.8*   ALBUMIN 2.1* 2.2*  2.2*  --  2.2*  --   --   --   --    PROT 5.4* 5.7*  5.7*  --  6.0  --   --   --   --    * 134*   < > 131*   < > 132* 128* 131*   K 3.9 4.1   < > 4.6   < > 4.7 5.2* 5.0   CO2 25 24   < > 19*   < > 20* 13* 19*    104   < > 103   < > 102 104 102   BUN 20 20   < > 30*   < > 39* 41* 48*   CREATININE 0.8 0.9   < > 2.3*   < > 2.9* 2.9* 3.3*   ALKPHOS 83 93  93  --  105  --   --   --   --    ALT 87* 84*  84*  --  82*  --   --   --   --    * 91*  91*  --  106*  --   --   --   --    BILITOT 1.2* 1.5*  1.5*  --  2.3*  --   --   --   --     < > = values in this interval not displayed.      Coagulation:   Recent Labs   Lab 11/29/18  0442 11/30/18  0500 11/30/18 2112 12/01/18  0416   INR 2.6*  2.6* 3.0* 3.9* 3.9*   APTT 33.6*  33.6* 31.2  31.2  --  36.1*  36.1*     LDH:  Recent Labs   Lab 11/30/18  0500 12/01/18  0416   * 333*      Microbiology:  Microbiology Results (last 7 days)     Procedure Component Value Units Date/Time    Blood culture [[id number]] Collected:  11/29/18 0601    Order Status:  Completed Specimen:  Blood Updated:  12/01/18 0812     Blood Culture, Routine No Growth to date     Blood Culture, Routine No Growth to date     Blood Culture, Routine No Growth to date    Blood culture [[id number]] Collected:  11/29/18 0601    Order Status:  Completed Specimen:  Blood Updated:  12/01/18 0812     Blood Culture, Routine No Growth to date     Blood Culture, Routine No Growth to date     Blood Culture, Routine No Growth to date    Blood culture [[id number]] Collected:  11/30/18 1649    Order Status:  Completed Specimen:  Blood Updated:  12/01/18 0115     Blood Culture, Routine No Growth to date    Narrative:       From 2 different sites 30 minutes apart  Collection has been rescheduled by JWB at 11/30/2018 14:47 Reason:   attempted to draw pt, both draws were unsuccessful. involved staffed   coordinator to assist with drawing patient.  Collection has been rescheduled by JWB at 11/30/2018 14:47 Reason:   attempted to draw pt, both draws were unsuccessful. involved staffed   coordinator to assist with drawing patient.    Blood culture [[id number]] Collected:  11/30/18 1510    Order Status:  Completed Specimen:  Blood Updated:  12/01/18 0115     Blood Culture, Routine No Growth to date    Narrative:       From 2 different sites 30 minutes apart  Collection has been rescheduled by JWB at 11/30/2018 14:47 Reason:   attempted to draw pt, both draws were unsuccessful. involved staffed   coordinator to assist with drawing patient.  Collection has been rescheduled by B at 11/30/2018 14:47 Reason:   attempted to draw pt, both draws were unsuccessful. involved staffed   coordinator to assist with drawing patient.    Blood culture [[id number]] Collected:  11/27/18 0449    Order Status:  Completed Specimen:  Blood from Peripheral, Antecubital,  Right Updated:  11/30/18 1255     Blood Culture, Routine Gram stain aer bottle: Gram positive cocci in clusters resembling Staph     Blood Culture, Routine Positive results previously called     Blood Culture, Routine --     METHICILLIN RESISTANT STAPHYLOCOCCUS AUREUS  ID consult required at [facility] and [facility].  For susceptibility see order #[id number]      Narrative:       Draw from 2 different sites at least 30 minutes apart.    Blood culture [[id number]]  (Susceptibility) Collected:  11/27/18 0446    Order Status:  Completed Specimen:  Blood from Peripheral, Antecubital, Left Updated:  11/30/18 1255     Blood Culture, Routine Gram stain aer bottle: Gram positive cocci in clusters resembling Staph      Blood Culture, Routine Results called to and read back by: Ethel Miranda RN 11/28/2018  10:52     Blood Culture, Routine --     METHICILLIN RESISTANT STAPHYLOCOCCUS AUREUS  ID consult required at [facility] and [facility].      Narrative:       Draw from 2 different sites at least 30 minutes apart.    Blood culture [[id number]]  (Susceptibility) Collected:  11/24/18 1215    Order Status:  Completed Specimen:  Blood from Peripheral, Forearm, Right Updated:  11/30/18 0847     Blood Culture, Routine Gram stain aer bottle: Gram positive cocci in clusters resembling Staph     Blood Culture, Routine Gram stain clement bottle: Gram positive cocci in clusters resembling Staph      Blood Culture, Routine Results called to and read back by: Trinity Forbes RN 11/25/2018  06:35     Blood Culture, Routine --     METHICILLIN RESISTANT STAPHYLOCOCCUS AUREUS  ID consult required at [facility] and [facility].      Blood culture [[id number]] Collected:  11/24/18 1220    Order Status:  Completed Specimen:  Blood from Peripheral, Wrist, Right Updated:  11/27/18 0950     Blood Culture, Routine Gram stain aer bottle: Gram positive cocci in clusters  resembling Staph      Blood Culture, Routine Results called to and read back by: Trinity Forbes RN 11/25/2018  06:35     Blood Culture, Routine --     METHICILLIN RESISTANT STAPHYLOCOCCUS AUREUS  ID consult required at [facility].  For susceptibility see order #[id number]      Blood culture x two cultures. Draw prior to antibiotics. [[id number]] Collected:  11/23/18 1613    Order Status:  Completed Specimen:  Blood from Peripheral, Antecubital, Left Updated:  11/26/18 1013     Blood Culture, Routine Gram stain clement bottle: Gram positive cocci in clusters resembling Staph     Blood Culture, Routine Results called to and read back by: Sharlene Landon RN 11/24/2018  09:36     Blood Culture, Routine --     METHICILLIN RESISTANT STAPHYLOCOCCUS AUREUS  ID consult required at [facility] and [facility].  For susceptibility see order #[id number]      Narrative:       Aerobic and anaerobic    Blood culture x two cultures. Draw prior to antibiotics. [[id number]]  (Susceptibility) Collected:  11/23/18 1613    Order Status:  Completed Specimen:  Blood from Peripheral, Hand, Left Updated:  11/26/18 1012     Blood Culture, Routine Gram stain clement bottle: Gram positive cocci in clusters resembling Staph     Blood Culture, Routine Results called to and read back by: Sharlene Landon RN 11/24/2018  09:35     Blood Culture, Routine Gram stain aer bottle: Gram positive cocci in clusters resembling Staph     Blood Culture, Routine --     METHICILLIN RESISTANT STAPHYLOCOCCUS AUREUS  ID consult required at [facility] and [facility].      Narrative:       Aerobic and anaerobic    Blood culture [[id number]]     Order Status:  Canceled Specimen:  Blood     Blood culture [[id number]]     Order Status:  Canceled Specimen:  Blood         I have reviewed all pertinent labs within the past 24 hours.    Estimated Creatinine Clearance: 21.4 mL/min (A) (based  on SCr of 3.3 mg/dL (H)).    Diagnostic Results:  I have reviewed and interpreted all pertinent imaging results/findings within the past 24 hours.

## 2018-12-01 NOTE — PROGRESS NOTES
12/01/2018  Jin Ham    Current provider:  Angie Torres MD      I, Jin Ham, rounded on Suman Hayden to ensure all mechanical assist device settings (IABP or VAD) were appropriate and all parameters were within limits.  I was able to ensure all back up equipment was present, the staff had no issues, and the Perfusion Department daily rounding was complete.    2:34 PM

## 2018-12-01 NOTE — HPI
Mr. Hayden is a 67 yo AAM with a PMH NICM (EF 20%) s/p LVAD placement 07/2017 on Coumadin, and VT on amiodarone s/p ICD (with revision in 11/2017 complicated by pocket hematoma and prior hospitalization for ICD shocks secondary to atrial tachycardia with abberancy).    Patient presented to [facility] on 11/23 for confusion, scrotal boils with spontaneous purulent discharge/rupture, and DLES infection (purulent discharge) - DLES was cultured. Patient spiked a fever of 102 and antibiotics were started (Vanc and Cefepime). CT chest/abd/pel was completed which showed new small volume fluid identified at the inferior aspect of the LVAD, a new small volume pericardial effusion, small left pleural effusion, and body wall edema. DLES and blood cultures came back (+) for MRSA [(+)11/27, (-)11/29]. 2D ECHO (-) for vegetations. Patient developed INDIRA thought to be 2/2 to ATN. On 11/28, patient started to require 1L O2 - was on room air throughout admission prior to this. On 11/29, patient had GLENN performed which showed ragged fibrous deposits along lead in right atrium, likely right ventricular lead, with filimentous mobile echodensity measuring 7mm. On 11/30, patient was moved to the ICU due to minimal UOP, worsening INDIRA, rising lactate of 4.4, and low blood pressures. Repeat CT chest/abd/pel completed which showed large volume L pleural effusion. Daptomycin and Ceftaroline were both started. UOP has dropped significantly over the last 24 hours, and gradually since the 27th. BNP on this admission was >1000, up to 1873 (usually below 1000).    To note, patient has a recent admission from 10/24/18 - 11/2/18 for GI bleed. He has a history of ileal ulcer s/p intervention with APC using balloon enteroscopy in 2017 followed by hospitalization in January 2018 and March 2018 where he underwent a partial small bowel resection due to recurrent bleeding.

## 2018-12-01 NOTE — PLAN OF CARE
Interval update:     CT C/A/P noted large L sided effusion and consolidation, worsened compared to 11/23/18. Official read pending. Reviewed with Dr. Ambrosio.     Will start on Ceftaroline 300mg q8h.     Noted LA resolved 4.4 -> 1.2    Serge Chavarria M.D.  Page # [phone]  Cardiovascular Fellow PGY-IV  [facility]

## 2018-12-01 NOTE — PT/OT/SLP DISCHARGE
Physical Therapy Discharge Summary    Name: Suman Hayden  MRN: [id number]   Principal Problem: Severe sepsis     Patient Discharged from acute Physical Therapy on 18. To ICU with L sided pleural effusion with consolidation  Please refer to prior PT noted date on 18 for functional status.     Assessment:     Patient has not met goals.    Objective:     GOALS:   Multidisciplinary Problems     Physical Therapy Goals        Problem: Physical Therapy Goal    Goal Priority Disciplines Outcome Goal Variances Interventions   Physical Therapy Goal     PT, PT/OT Ongoing (interventions implemented as appropriate)     Description:  Goals to be met by: 18     Patient will increase functional independence with mobility by performin. Supine to sit with [place] -not met  2. Sit to supine with [place] -not met  3. Sit to stand transfer with Stand-by Assistance-not met  4. Bed to chair transfer with Stand-by Assistance using no AD -not met  5. Gait  x 150 feet with Minimal Assistance using no AD -not met  6. Stand for 5 minutes with Stand-by Assistance using no AD -not met                       Reasons for Discontinuation of Therapy Services  Transfer to alternate level of care.      Plan:     Patient Discharged to: to ICU new orders needed.    Astrid Whaley, PT  2018

## 2018-12-01 NOTE — PROGRESS NOTES
Notified MD Ambrosio of consistent PI events with occasional speed drops to 4800 noted in history. Also informed of CVP 11 performed at bedside. Will continue to monitor.

## 2018-12-01 NOTE — PROGRESS NOTES
[facility]  Heart Transplant  Progress Note    Patient Name: Suman Hayden  MRN: [id number]  Admission Date: 11/23/2018  Hospital Length of Stay: 8 days  Attending Physician: Angie Torres MD  Primary Care Provider: Joe Ernst MD  Principal Problem:Severe sepsis    Subjective:     Interval History: Moved to ICU yesterday afternoon for worsening renal fxn/sepsis. Feeling a bit better this am. No complaints. Wife at bedside.     Scheduled Meds:   amiodarone  400 mg Oral BID    ceftaroline fosamil  300 mg Intravenous Q8H    DAPTOmycin (CUBICIN)  IV  10 mg/kg Intravenous Q48H    dronabinol  5 mg Oral BID    ferrous sulfate  325 mg Oral TID    mupirocin   Topical (Top) Daily    oxyCODONE-acetaminophen  1 tablet Oral Once    pantoprazole  40 mg Oral BID     PRN Meds:acetaminophen, polyethylene glycol, polyethylene glycol, traMADol    Review of patient's allergies indicates:   Allergen Reactions    Asa [aspirin] Other (See Comments)     Bleeding ulcer     Objective:     Vital Signs (Most Recent):  Temp: 98 °F (36.7 °C) (12/01/18 0800)  Pulse: 102 (12/01/18 0900)  Resp: (!) 36 (12/01/18 0900)  BP: (!) 201/75 (12/01/18 0900)  SpO2: 95 % (12/01/18 0245) Vital Signs (24h Range):  Temp:  [98 °F (36.7 °C)-99.9 °F (37.7 °C)] 98 °F (36.7 °C)  Pulse:  [] 102  Resp:  [25-44] 36  SpO2:  [93 %-98 %] 95 %  BP: ()/(0-88) 201/75     Patient Vitals for the past 72 hrs (Last 3 readings):   Weight   11/30/18 1845 76.6 kg (168 lb 14 oz)   11/30/18 0400 73.5 kg (162 lb 0.6 oz)   11/29/18 0416 75.7 kg (166 lb 14.2 oz)     Body mass index is 24.94 kg/m².      Intake/Output Summary (Last 24 hours) at 12/1/2018 0918  Last data filed at 12/1/2018 0900  Gross per 24 hour   Intake 953 ml   Output 120 ml   Net 833 ml     CVP:  [8 mmHg] 8 mmHg  Physical Exam   Constitutional: He is oriented to person, place, and time. No distress.   HENT:   Mouth/Throat: Oropharynx is clear and moist.   Eyes: Pupils  are equal, round, and reactive to light.   Neck: Neck supple.   Cardiovascular:   Normal VAD hum    Pulmonary/Chest: Effort normal.   Decreased BS on L side and in BLL   Abdominal: Soft. He exhibits no distension and no mass. There is no tenderness. There is no guarding.   Genitourinary:   Genitourinary Comments: Three boils present on testicles.  2 of which were openly draining   Musculoskeletal: He exhibits no edema.   Neurological: He is alert and oriented to person, place, and time. No cranial nerve deficit.   Skin: Skin is warm and dry.   Psychiatric: He has a normal mood and affect. His behavior is normal.     Significant Labs:  CBC:  Recent Labs   Lab 11/30/18  0500 11/30/18  0855 12/01/18  0416   WBC 44.05*  44.05* 43.37* 52.45*  52.45*   RBC 4.31*  4.31* 4.08* 3.71*  3.71*   HGB 9.9*  9.9* 9.4* 8.5*  8.5*   HCT 32.0*  32.0* 30.3* 27.2*  27.2*   *  374* 344 331  331   MCV 74*  74* 74* 73*  73*   MCH 23.0*  23.0* 23.0* 22.9*  22.9*   MCHC 30.9*  30.9* 31.0* 31.3*  31.3*     BNP:  Recent Labs   Lab 11/26/18  0424 11/28/18  0301 11/30/18  0500   BNP 1,512* 1,430*  1,430* 1,005*     CMP:  Recent Labs   Lab 11/27/18  0409 11/28/18  0301  11/30/18  0500  11/30/18  1511 11/30/18  2000 12/01/18  0416   GLU 89 95   < > 158*   < > 172* 132* 113*   CALCIUM 8.0* 8.1*   < > 8.0*   < > 8.0* 7.8* 7.8*   ALBUMIN 2.1* 2.2*  2.2*  --  2.2*  --   --   --   --    PROT 5.4* 5.7*  5.7*  --  6.0  --   --   --   --    * 134*   < > 131*   < > 132* 128* 131*   K 3.9 4.1   < > 4.6   < > 4.7 5.2* 5.0   CO2 25 24   < > 19*   < > 20* 13* 19*    104   < > 103   < > 102 104 102   BUN 20 20   < > 30*   < > 39* 41* 48*   CREATININE 0.8 0.9   < > 2.3*   < > 2.9* 2.9* 3.3*   ALKPHOS 83 93  93  --  105  --   --   --   --    ALT 87* 84*  84*  --  82*  --   --   --   --    * 91*  91*  --  106*  --   --   --   --    BILITOT 1.2* 1.5*  1.5*  --  2.3*  --   --   --   --     < > = values in this  interval not displayed.      Coagulation:   Recent Labs   Lab 11/29/18  0442 11/30/18  0500 11/30/18  2112 12/01/18  0416   INR 2.6*  2.6* 3.0* 3.9* 3.9*   APTT 33.6*  33.6* 31.2  31.2  --  36.1*  36.1*     LDH:  Recent Labs   Lab 11/30/18  0500 12/01/18  0416   * 333*     Microbiology:  Microbiology Results (last 7 days)     Procedure Component Value Units Date/Time    Blood culture [[id number]] Collected:  11/29/18 0601    Order Status:  Completed Specimen:  Blood Updated:  12/01/18 0812     Blood Culture, Routine No Growth to date     Blood Culture, Routine No Growth to date     Blood Culture, Routine No Growth to date    Blood culture [[id number]] Collected:  11/29/18 0601    Order Status:  Completed Specimen:  Blood Updated:  12/01/18 0812     Blood Culture, Routine No Growth to date     Blood Culture, Routine No Growth to date     Blood Culture, Routine No Growth to date    Blood culture [[id number]] Collected:  11/30/18 1649    Order Status:  Completed Specimen:  Blood Updated:  12/01/18 0115     Blood Culture, Routine No Growth to date    Narrative:       From 2 different sites 30 minutes apart  Collection has been rescheduled by [facility] at 11/30/2018 14:47 Reason:   attempted to draw pt, both draws were unsuccessful. involved staffed   coordinator to assist with drawing patient.  Collection has been rescheduled by [facility] at 11/30/2018 14:47 Reason:   attempted to draw pt, both draws were unsuccessful. involved staffed   coordinator to assist with drawing patient.    Blood culture [[id number]] Collected:  11/30/18 1510    Order Status:  Completed Specimen:  Blood Updated:  12/01/18 0115     Blood Culture, Routine No Growth to date    Narrative:       From 2 different sites 30 minutes apart  Collection has been rescheduled by [facility] at 11/30/2018 14:47 Reason:   attempted to draw pt, both draws were unsuccessful. involved staffed   coordinator to assist with drawing patient.  Collection has been rescheduled by  [facility] at 11/30/2018 14:47 Reason:   attempted to draw pt, both draws were unsuccessful. involved staffed   coordinator to assist with drawing patient.    Blood culture [[id number]] Collected:  11/27/18 0449    Order Status:  Completed Specimen:  Blood from Peripheral, Antecubital, Right Updated:  11/30/18 1255     Blood Culture, Routine Gram stain aer bottle: Gram positive cocci in clusters resembling Staph     Blood Culture, Routine Positive results previously called     Blood Culture, Routine --     METHICILLIN RESISTANT STAPHYLOCOCCUS AUREUS  ID consult required at [facility] and [facility]   locations.  For susceptibility see order #[id number]      Narrative:       Draw from 2 different sites at least 30 minutes apart.    Blood culture [[id number]]  (Susceptibility) Collected:  11/27/18 0446    Order Status:  Completed Specimen:  Blood from Peripheral, Antecubital, Left Updated:  11/30/18 1255     Blood Culture, Routine Gram stain aer bottle: Gram positive cocci in clusters resembling Staph      Blood Culture, Routine Results called to and read back by: Ethel Miranda RN 11/28/2018  10:52     Blood Culture, Routine --     METHICILLIN RESISTANT STAPHYLOCOCCUS AUREUS  ID consult required at [facility] and [facility]   locations.      Narrative:       Draw from 2 different sites at least 30 minutes apart.    Blood culture [[id number]]  (Susceptibility) Collected:  11/24/18 1215    Order Status:  Completed Specimen:  Blood from Peripheral, Forearm, Right Updated:  11/30/18 0847     Blood Culture, Routine Gram stain aer bottle: Gram positive cocci in clusters resembling Staph     Blood Culture, Routine Gram stain clement bottle: Gram positive cocci in clusters resembling Staph      Blood Culture, Routine Results called to and read back by: Trinity Forbes RN 11/25/2018  06:35     Blood Culture, Routine --     METHICILLIN RESISTANT STAPHYLOCOCCUS AUREUS  ID consult required at [facility]  [place].      Blood culture [[id number]] Collected:  11/24/18 1220    Order Status:  Completed Specimen:  Blood from Peripheral, Wrist, Right Updated:  11/27/18 0950     Blood Culture, Routine Gram stain aer bottle: Gram positive cocci in clusters resembling Staph      Blood Culture, Routine Results called to and read back by: Trinity Forbes RN 11/25/2018  06:35     Blood Culture, Routine --     METHICILLIN RESISTANT STAPHYLOCOCCUS AUREUS  ID consult required at [facility].  For susceptibility see order #[id number]      Blood culture x two cultures. Draw prior to antibiotics. [[id number]] Collected:  11/23/18 1613    Order Status:  Completed Specimen:  Blood from Peripheral, Antecubital, Left Updated:  11/26/18 1013     Blood Culture, Routine Gram stain clement bottle: Gram positive cocci in clusters resembling Staph     Blood Culture, Routine Results called to and read back by: Sharlene Landon RN 11/24/2018  09:36     Blood Culture, Routine --     METHICILLIN RESISTANT STAPHYLOCOCCUS AUREUS  ID consult required at [facility].  For susceptibility see order #[id number]      Narrative:       Aerobic and anaerobic    Blood culture x two cultures. Draw prior to antibiotics. [[id number]]  (Susceptibility) Collected:  11/23/18 1613    Order Status:  Completed Specimen:  Blood from Peripheral, Hand, Left Updated:  11/26/18 1012     Blood Culture, Routine Gram stain clement bottle: Gram positive cocci in clusters resembling Staph     Blood Culture, Routine Results called to and read back by: Sharlene Landon RN 11/24/2018  09:35     Blood Culture, Routine Gram stain aer bottle: Gram positive cocci in clusters resembling Staph     Blood Culture, Routine --     METHICILLIN RESISTANT STAPHYLOCOCCUS AUREUS  ID consult required at [facility].      Narrative:       Aerobic  and anaerobic    Blood culture [[id number]]     Order Status:  Canceled Specimen:  Blood     Blood culture [[id number]]     Order Status:  Canceled Specimen:  Blood         I have reviewed all pertinent labs within the past 24 hours.    Estimated Creatinine Clearance: 21.4 mL/min (A) (based on SCr of 3.3 mg/dL (H)).    Diagnostic Results:  I have reviewed and interpreted all pertinent imaging results/findings within the past 24 hours.    Assessment and Plan:     68 year old male with PMHx significant for HFrEF secondary to non ischemic cardiomyopathy underwent Heartmate  3 LVAD implanted as DT therapy 7/27/17 and VT on amiodarone s/p ICD (with revision in 11/2017 complicated by pocket hematoma). Presents to ED today from clinic for worsening DLES infection and fever. Patient seen by VAD nurse and ID staff who feel admission to \[facility]\"" with IV antibiotics warranted. Patient also with fevers, chills, decreased appetite, scrotal boils with spontaneous purulent discharge/rupture, urinary incontinence and confusion at home. History provided by patient and supplemented by his wife. He denies recent travel or being around sick contacts. He is complaint with taking his home medications. Of note, he was recently hospitalized for GIB, history of ileal ulcer s/p intervention. Being followed also by EP by Dr. Winchester for possible future VT ablation. Pt denies recent ICD shocks as well as low flow alarms from his VAD.  In the ER, he was found febrile with Tmax of 103 deg F, doppler BP 60-70's and HR 70s. He was given 2 Liters NS IVFs, cefepime/vancomycin and resumed on home meds except aldactone. Blood cultures sent as well as gram stain and culture of specimen from drive line. CXR showed left small pleural effusion and CT abdomen performed that showed small volume fluid inferior aspect of LVAD along with small pericardial effusion. Labs notable for leukocytosis, mild INDIRA, elevated AST/ALT/T.bili, BNP and lactate 2.7 as well as CRP  "of 215. LDH was 294. VAD was interrogated and noted to have PI events. Pt was admitted to the ICU for further management/care.     * Severe sepsis    -Presented with fever, found to have leukocytosis, elevated lactate/CRP and mild INDIRA.  -Sources potentially: drive line infection (purulent discharge- sampled already and sent to lab; GS showed rare WBCs, CT abdomen showed small volume of fluid inferior aspect of VAD) vs skin infection (scrotal area with boils) vs UTI (pt with frequency, concentrated/dark urine).  -Derm consulted for management of scrotal skin issue. Continue Mucinprocin   -Appreciate Id Cx. Changed to Daptomycin and added ceftaroline.   -repeat blood cultures 11/27 are positive. Repeat blood cultures 11/29 NGTD.  -GLENN with "Ragged fibrous deposits along lead in right atrium, likely right ventricular lead, with filimentous mobile echodensity measuring 7mm (image 6), cannot exclude endocarditis."   -WBC count significantly elevated but lactate improving.  -CVP ~8-10 this am. Will place A-line for BP monitoring.   -CT chest/abd/pelvis 11/30 with "large volume left-sided pleural effusion with associated near complete collapse of the left lung and rightward mediastinal shift". Will consult PUL for possible drainage.        LVAD (left ventricular assist device) present    -HM3 5200 rpm, DT.  -Not on home diuretics. CVP 8-10.  -INR therapeutic. Holding coumadin.   -ASA stopped on 12/22/17 after GI bleed and ileal ulcer   -LDH stable  -BP wnl. Placing A-line as above.     Procedure: Device Interrogation Including analysis of device parameters  Current Settings: Ventricular Assist Device  Review of device function is stable/unstable stable    TXP LVAD INTERROGATIONS 12/1/2018 12/1/2018 12/1/2018 12/1/2018 12/1/2018 12/1/2018 12/1/2018   Type HeartMate3 HeartMate3 HeartMate3 HeartMate3 HeartMate3 HeartMate3 HeartMate3   Flow 3.9 4.5 4 3.9 3.6 3.9 3.8   Speed 5150 4800 5200 5200 5200 5200 5200   PI 3.8 3.5 8 7.8 " 8.5 7.7 8.7   Power ([place]) 3.4 3.5 3.6 3.6 3.7 3.6 3.6   LSL - - - - - - -   Pulsatility - Intermittent pulse - Intermittent pulse Intermittent pulse Intermittent pulse Intermittent pulse        INDIRA (acute kidney injury)    -May be secondary to hypotension during GLENN?  -Holding ACEI. CVP 8-10. Hold off on diuretics for now.     Ventricular tachyarrhythmia    - EP consulted. Successfully ATP'd out of VT. Increased ATP burst intervals and lowered detection zone. Activated tachytherapies as well see EP note.   -Transitioned to PO amio.      Transaminitis    -likely due to sepsis vs effects of amiodarone  -will trend LFT's   -CT abdomen: liver is normal in size and attenuation without focal hepatic lesion.  There is gallbladder sludge.  The spleen, adrenal glands, and pancreas are unremarkable.   -holding home pravastatin     Iron deficiency anemia due to chronic blood loss    -c/w home oral iron supplementation tablets     Chronic combined systolic and diastolic congestive heart failure    -Holding home aldactone/Lisinopril.     Uninterrupted Critical Care/Counseling Time (not including procedures): 45mn      Kishore Paz NP  Heart Transplant  [facility]

## 2018-12-02 PROBLEM — B95.62 MRSA BACTEREMIA: Status: ACTIVE | Noted: 2017-08-18

## 2018-12-02 NOTE — PLAN OF CARE
Problem: Patient Care Overview  Goal: Plan of Care Review  Outcome: Ongoing (interventions implemented as appropriate)  Pt AAOx4. Pt on 2L nasal canula. Levo continued and titrated to keep MAP > 60. Doppler 80-82. CVP 9-10. Ambulated to chair this AM. VS and assessment via flowsheet. Plan of care addressed with patient and spouse. Will continue to monitor.

## 2018-12-02 NOTE — ASSESSMENT & PLAN NOTE
"-Presented with fever, found to have leukocytosis, elevated lactate/CRP and mild INDIRA.  -Sources potentially: drive line infection (purulent discharge- sampled already and sent to lab; GS showed rare WBCs, CT abdomen showed small volume of fluid inferior aspect of VAD) vs skin infection (scrotal area with boils) vs UTI (pt with frequency, concentrated/dark urine).  -Derm consulted for management of scrotal skin issue. Continue Mucinprocin   -Appreciate Id Cx. Continue Daptomycin and ceftaroline.   -Blood cultures 11/27 with MRSA. Cultures since NGTD.   -GLENN with "Ragged fibrous deposits along lead in right atrium, likely right ventricular lead, with filimentous mobile echodensity measuring 7mm (image 6), cannot exclude endocarditis." Will consult EP once more stable.   -WBC count still significantly elevated.  -CVP ~8-10 this am. Will place A-line for BP monitoring.   -CT chest/abd/pelvis 11/30 with "large volume left-sided pleural effusion with associated near complete collapse of the left lung and rightward mediastinal shift".   -Appreciate PUL Cx. Holding off on thoracentesis until INR drops.     "

## 2018-12-02 NOTE — SUBJECTIVE & OBJECTIVE
Interval History: No complaints this am. Levo increased overnight due to low MAPs.     Scheduled Meds:   amiodarone  400 mg Oral BID    ceftaroline fosamil  300 mg Intravenous Q8H    DAPTOmycin (CUBICIN)  IV  10 mg/kg Intravenous Q48H    dronabinol  5 mg Oral BID    ferrous sulfate  325 mg Oral TID    mupirocin   Topical (Top) Daily    oxyCODONE-acetaminophen  1 tablet Oral Once    pantoprazole  40 mg Oral BID     PRN Meds:acetaminophen, polyethylene glycol, polyethylene glycol, traMADol    Review of patient's allergies indicates:   Allergen Reactions    Asa [aspirin] Other (See Comments)     Bleeding ulcer     Objective:     Vital Signs (Most Recent):  Temp: 97.2 °F (36.2 °C) (12/02/18 0700)  Pulse: 86 (12/02/18 0900)  Resp: (!) 29 (12/02/18 0900)  BP: (!) 75/0 (12/02/18 0700)  SpO2: 99 % (12/02/18 0700) Vital Signs (24h Range):  Temp:  [97.2 °F (36.2 °C)-98.7 °F (37.1 °C)] 97.2 °F (36.2 °C)  Pulse:  [] 86  Resp:  [22-42] 29  SpO2:  [96 %-100 %] 99 %  BP: ()/(0-123) 75/0  Arterial Line BP: (57-93)/(47-71) 82/66     Patient Vitals for the past 72 hrs (Last 3 readings):   Weight   11/30/18 1845 76.6 kg (168 lb 14 oz)   11/30/18 0400 73.5 kg (162 lb 0.6 oz)     Body mass index is 24.94 kg/m².      Intake/Output Summary (Last 24 hours) at 12/2/2018 0912  Last data filed at 12/2/2018 0900  Gross per 24 hour   Intake 1302.95 ml   Output 420 ml   Net 882.95 ml     CVP:  [9 mmHg-10 mmHg] 10 mmHg  Physical Exam   Constitutional: He is oriented to person, place, and time. No distress.   HENT:   Mouth/Throat: Oropharynx is clear and moist.   Eyes: Pupils are equal, round, and reactive to light.   Neck: Neck supple.   Cardiovascular:   Normal VAD hum    Pulmonary/Chest: Effort normal.   Decreased BS on L side and in BLL   Abdominal: Soft. He exhibits no distension and no mass. There is no tenderness. There is no guarding.   Genitourinary:   Genitourinary Comments: Three boils present on testicles.  2 of  which were openly draining   Musculoskeletal: He exhibits no edema.   Neurological: He is alert and oriented to person, place, and time. No cranial nerve deficit.   Skin: Skin is warm and dry.   Psychiatric: He has a normal mood and affect. His behavior is normal.     Significant Labs:  CBC:  Recent Labs   Lab 11/30/18  0855 12/01/18  0416 12/02/18  0430   WBC 43.37* 52.45*  52.45* 51.76*   RBC 4.08* 3.71*  3.71* 4.06*   HGB 9.4* 8.5*  8.5* 9.6*   HCT 30.3* 27.2*  27.2* 29.5*    331  331 383*   MCV 74* 73*  73* 73*   MCH 23.0* 22.9*  22.9* 23.6*   MCHC 31.0* 31.3*  31.3* 32.5     BNP:  Recent Labs   Lab 11/26/18  0424 11/28/18  0301 11/30/18  0500   BNP 1,512* 1,430*  1,430* 1,005*     CMP:  Recent Labs   Lab 11/27/18  0409 11/28/18  0301  11/30/18  0500  11/30/18  2000 12/01/18  0416 12/02/18  0430   GLU 89 95   < > 158*   < > 132* 113* 135*   CALCIUM 8.0* 8.1*   < > 8.0*   < > 7.8* 7.8* 7.9*   ALBUMIN 2.1* 2.2*  2.2*  --  2.2*  --   --   --   --    PROT 5.4* 5.7*  5.7*  --  6.0  --   --   --   --    * 134*   < > 131*   < > 128* 131* 127*   K 3.9 4.1   < > 4.6   < > 5.2* 5.0 5.0   CO2 25 24   < > 19*   < > 13* 19* 18*    104   < > 103   < > 104 102 100   BUN 20 20   < > 30*   < > 41* 48* 59*   CREATININE 0.8 0.9   < > 2.3*   < > 2.9* 3.3* 3.4*   ALKPHOS 83 93  93  --  105  --   --   --   --    ALT 87* 84*  84*  --  82*  --   --   --   --    * 91*  91*  --  106*  --   --   --   --    BILITOT 1.2* 1.5*  1.5*  --  2.3*  --   --   --   --     < > = values in this interval not displayed.      Coagulation:   Recent Labs   Lab 11/30/18 0500 11/30/18 2112 12/01/18 0416 12/02/18 0430   INR 3.0* 3.9* 3.9* 4.3*   APTT 31.2  31.2  --  36.1*  36.1* 40.0*     LDH:  Recent Labs   Lab 11/30/18 0500 12/01/18 0416 12/02/18  0430   * 333* 281*     Microbiology:  Microbiology Results (last 7 days)     Procedure Component Value Units Date/Time    Blood culture [[id number]]  Collected:  11/29/18 0601    Order Status:  Completed Specimen:  Blood Updated:  12/02/18 0812     Blood Culture, Routine No Growth to date     Blood Culture, Routine No Growth to date     Blood Culture, Routine No Growth to date     Blood Culture, Routine No Growth to date    Blood culture [[id number]] Collected:  11/29/18 0601    Order Status:  Completed Specimen:  Blood Updated:  12/02/18 0812     Blood Culture, Routine No Growth to date     Blood Culture, Routine No Growth to date     Blood Culture, Routine No Growth to date     Blood Culture, Routine No Growth to date    Blood culture [[id number]] Collected:  11/30/18 1649    Order Status:  Completed Specimen:  Blood Updated:  12/01/18 1812     Blood Culture, Routine No Growth to date     Blood Culture, Routine No Growth to date    Narrative:       From 2 different sites 30 minutes apart  Collection has been rescheduled by JWB at 11/30/2018 14:47 Reason:   attempted to draw pt, both draws were unsuccessful. involved staffed   coordinator to assist with drawing patient.  Collection has been rescheduled by JWB at 11/30/2018 14:47 Reason:   attempted to draw pt, both draws were unsuccessful. involved staffed   coordinator to assist with drawing patient.    Blood culture [[id number]] Collected:  11/30/18 1510    Order Status:  Completed Specimen:  Blood Updated:  12/01/18 1812     Blood Culture, Routine No Growth to date     Blood Culture, Routine No Growth to date    Narrative:       From 2 different sites 30 minutes apart  Collection has been rescheduled by JWB at 11/30/2018 14:47 Reason:   attempted to draw pt, both draws were unsuccessful. involved staffed   coordinator to assist with drawing patient.  Collection has been rescheduled by JWB at 11/30/2018 14:47 Reason:   attempted to draw pt, both draws were unsuccessful. involved staffed   coordinator to assist with drawing patient.    Blood culture [[id number]] Collected:  11/27/18 0449    Order Status:  Completed  Specimen:  Blood from Peripheral, Antecubital, Right Updated:  11/30/18 1255     Blood Culture, Routine Gram stain aer bottle: Gram positive cocci in clusters resembling Staph     Blood Culture, Routine Positive results previously called     Blood Culture, Routine --     METHICILLIN RESISTANT STAPHYLOCOCCUS AUREUS  ID consult required at [facility].  For susceptibility see order #[id number]      Narrative:       Draw from 2 different sites at least 30 minutes apart.    Blood culture [[id number]]  (Susceptibility) Collected:  11/27/18 0446    Order Status:  Completed Specimen:  Blood from Peripheral, Antecubital, Left Updated:  11/30/18 1255     Blood Culture, Routine Gram stain aer bottle: Gram positive cocci in clusters resembling Staph      Blood Culture, Routine Results called to and read back by: Ethel Miranda RN 11/28/2018  10:52     Blood Culture, Routine --     METHICILLIN RESISTANT STAPHYLOCOCCUS AUREUS  ID consult required at [facility].      Narrative:       Draw from 2 different sites at least 30 minutes apart.    Blood culture [[id number]]  (Susceptibility) Collected:  11/24/18 1215    Order Status:  Completed Specimen:  Blood from Peripheral, Forearm, Right Updated:  11/30/18 0847     Blood Culture, Routine Gram stain aer bottle: Gram positive cocci in clusters resembling Staph     Blood Culture, Routine Gram stain clement bottle: Gram positive cocci in clusters resembling Staph      Blood Culture, Routine Results called to and read back by: Trinity Forbes RN 11/25/2018  06:35     Blood Culture, Routine --     METHICILLIN RESISTANT STAPHYLOCOCCUS AUREUS  ID consult required at [facility].      Blood culture [[id number]] Collected:  11/24/18 1220    Order Status:  Completed Specimen:  Blood from Peripheral, Wrist, Right Updated:  11/27/18 0950     Blood Culture, Routine Gram stain aer  bottle: Gram positive cocci in clusters resembling Staph      Blood Culture, Routine Results called to and read back by: Trinity Forbes RN 11/25/2018  06:35     Blood Culture, Routine --     METHICILLIN RESISTANT STAPHYLOCOCCUS AUREUS  ID consult required at [facility].  For susceptibility see order #[id number]      Blood culture x two cultures. Draw prior to antibiotics. [[id number]] Collected:  11/23/18 1613    Order Status:  Completed Specimen:  Blood from Peripheral, Antecubital, Left Updated:  11/26/18 1013     Blood Culture, Routine Gram stain clement bottle: Gram positive cocci in clusters resembling Staph     Blood Culture, Routine Results called to and read back by: Sharlene Landon RN 11/24/2018  09:36     Blood Culture, Routine --     METHICILLIN RESISTANT STAPHYLOCOCCUS AUREUS  ID consult required at [facility].  For susceptibility see order #[id number]      Narrative:       Aerobic and anaerobic    Blood culture x two cultures. Draw prior to antibiotics. [[id number]]  (Susceptibility) Collected:  11/23/18 1613    Order Status:  Completed Specimen:  Blood from Peripheral, Hand, Left Updated:  11/26/18 1012     Blood Culture, Routine Gram stain clement bottle: Gram positive cocci in clusters resembling Staph     Blood Culture, Routine Results called to and read back by: Sharlene Landon RN 11/24/2018  09:35     Blood Culture, Routine Gram stain aer bottle: Gram positive cocci in clusters resembling Staph     Blood Culture, Routine --     METHICILLIN RESISTANT STAPHYLOCOCCUS AUREUS  ID consult required at [facility].      Narrative:       Aerobic and anaerobic        I have reviewed all pertinent labs within the past 24 hours.    Estimated Creatinine Clearance: 20.8 mL/min (A) (based on SCr of 3.4 mg/dL (H)).    Diagnostic Results:  I have reviewed and interpreted all pertinent imaging  results/findings within the past 24 hours.

## 2018-12-02 NOTE — ASSESSMENT & PLAN NOTE
-HM3 5200 rpm, DT.  -Not on home diuretics. CVP ~10.  -INR supra therapeutic. Holding coumadin.   -ASA stopped on 12/22/17 after GI bleed and ileal ulcer   -LDH stable  -Norepi for MAP >60    Procedure: Device Interrogation Including analysis of device parameters  Current Settings: Ventricular Assist Device  Review of device function is stable/unstable stable    TXP LVAD INTERROGATIONS 12/2/2018 12/2/2018 12/2/2018 12/2/2018 12/2/2018 12/2/2018 12/2/2018   Type HeartMate3 HeartMate3 HeartMate3 HeartMate3 HeartMate3 HeartMate3 HeartMate3   Flow 3.6 4.0 3.7 3.8 3.7 3.8 3.7   Speed 5200 5200 5200 5200 5200 5200 5200   PI 3.7 3.3 4.7 4.6 4.2 4.8 4.4   Power ([place]) 3.5 3.4 3.5 3.5 3.6 3.4 3.4   LSL - - - - - - -   Pulsatility Intermittent pulse Intermittent pulse Intermittent pulse Intermittent pulse Intermittent pulse Intermittent pulse Intermittent pulse

## 2018-12-02 NOTE — PROGRESS NOTES
[facility]  Heart Transplant  Progress Note    Patient Name: Suman Hayden  MRN: [id number]  Admission Date: 11/23/2018  Hospital Length of Stay: 9 days  Attending Physician: Mohit Ambrosio MD  Primary Care Provider: Joe Ernst MD  Principal Problem:Severe sepsis    Subjective:     Interval History: No complaints this am. Levo increased overnight due to low MAPs.     Scheduled Meds:   amiodarone  400 mg Oral BID    ceftaroline fosamil  300 mg Intravenous Q8H    DAPTOmycin (CUBICIN)  IV  10 mg/kg Intravenous Q48H    dronabinol  5 mg Oral BID    ferrous sulfate  325 mg Oral TID    mupirocin   Topical (Top) Daily    oxyCODONE-acetaminophen  1 tablet Oral Once    pantoprazole  40 mg Oral BID     PRN Meds:acetaminophen, polyethylene glycol, polyethylene glycol, traMADol    Review of patient's allergies indicates:   Allergen Reactions    Asa [aspirin] Other (See Comments)     Bleeding ulcer     Objective:     Vital Signs (Most Recent):  Temp: 97.2 °F (36.2 °C) (12/02/18 0700)  Pulse: 86 (12/02/18 0900)  Resp: (!) 29 (12/02/18 0900)  BP: (!) 75/0 (12/02/18 0700)  SpO2: 99 % (12/02/18 0700) Vital Signs (24h Range):  Temp:  [97.2 °F (36.2 °C)-98.7 °F (37.1 °C)] 97.2 °F (36.2 °C)  Pulse:  [] 86  Resp:  [22-42] 29  SpO2:  [96 %-100 %] 99 %  BP: ()/(0-123) 75/0  Arterial Line BP: (57-93)/(47-71) 82/66     Patient Vitals for the past 72 hrs (Last 3 readings):   Weight   11/30/18 1845 76.6 kg (168 lb 14 oz)   11/30/18 0400 73.5 kg (162 lb 0.6 oz)     Body mass index is 24.94 kg/m².      Intake/Output Summary (Last 24 hours) at 12/2/2018 0912  Last data filed at 12/2/2018 0900  Gross per 24 hour   Intake 1302.95 ml   Output 420 ml   Net 882.95 ml     CVP:  [9 mmHg-10 mmHg] 10 mmHg  Physical Exam   Constitutional: He is oriented to person, place, and time. No distress.   HENT:   Mouth/Throat: Oropharynx is clear and moist.   Eyes: Pupils are equal, round, and reactive to light.    Neck: Neck supple.   Cardiovascular:   Normal VAD hum    Pulmonary/Chest: Effort normal.   Decreased BS on L side and in BLL   Abdominal: Soft. He exhibits no distension and no mass. There is no tenderness. There is no guarding.   Genitourinary:   Genitourinary Comments: Three boils present on testicles.  2 of which were openly draining   Musculoskeletal: He exhibits no edema.   Neurological: He is alert and oriented to person, place, and time. No cranial nerve deficit.   Skin: Skin is warm and dry.   Psychiatric: He has a normal mood and affect. His behavior is normal.     Significant Labs:  CBC:  Recent Labs   Lab 11/30/18  0855 12/01/18  0416 12/02/18  0430   WBC 43.37* 52.45*  52.45* 51.76*   RBC 4.08* 3.71*  3.71* 4.06*   HGB 9.4* 8.5*  8.5* 9.6*   HCT 30.3* 27.2*  27.2* 29.5*    331  331 383*   MCV 74* 73*  73* 73*   MCH 23.0* 22.9*  22.9* 23.6*   MCHC 31.0* 31.3*  31.3* 32.5     BNP:  Recent Labs   Lab 11/26/18  0424 11/28/18  0301 11/30/18  0500   BNP 1,512* 1,430*  1,430* 1,005*     CMP:  Recent Labs   Lab 11/27/18  0409 11/28/18  0301  11/30/18  0500  11/30/18  2000 12/01/18  0416 12/02/18  0430   GLU 89 95   < > 158*   < > 132* 113* 135*   CALCIUM 8.0* 8.1*   < > 8.0*   < > 7.8* 7.8* 7.9*   ALBUMIN 2.1* 2.2*  2.2*  --  2.2*  --   --   --   --    PROT 5.4* 5.7*  5.7*  --  6.0  --   --   --   --    * 134*   < > 131*   < > 128* 131* 127*   K 3.9 4.1   < > 4.6   < > 5.2* 5.0 5.0   CO2 25 24   < > 19*   < > 13* 19* 18*    104   < > 103   < > 104 102 100   BUN 20 20   < > 30*   < > 41* 48* 59*   CREATININE 0.8 0.9   < > 2.3*   < > 2.9* 3.3* 3.4*   ALKPHOS 83 93  93  --  105  --   --   --   --    ALT 87* 84*  84*  --  82*  --   --   --   --    * 91*  91*  --  106*  --   --   --   --    BILITOT 1.2* 1.5*  1.5*  --  2.3*  --   --   --   --     < > = values in this interval not displayed.      Coagulation:   Recent Labs   Lab 11/30/18  0500 11/30/18 2112 12/01/18  0416  12/02/18  0430   INR 3.0* 3.9* 3.9* 4.3*   APTT 31.2  31.2  --  36.1*  36.1* 40.0*     LDH:  Recent Labs   Lab 11/30/18  0500 12/01/18  0416 12/02/18  0430   * 333* 281*     Microbiology:  Microbiology Results (last 7 days)     Procedure Component Value Units Date/Time    Blood culture [[id number]] Collected:  11/29/18 0601    Order Status:  Completed Specimen:  Blood Updated:  12/02/18 0812     Blood Culture, Routine No Growth to date     Blood Culture, Routine No Growth to date     Blood Culture, Routine No Growth to date     Blood Culture, Routine No Growth to date    Blood culture [[id number]] Collected:  11/29/18 0601    Order Status:  Completed Specimen:  Blood Updated:  12/02/18 0812     Blood Culture, Routine No Growth to date     Blood Culture, Routine No Growth to date     Blood Culture, Routine No Growth to date     Blood Culture, Routine No Growth to date    Blood culture [[id number]] Collected:  11/30/18 1649    Order Status:  Completed Specimen:  Blood Updated:  12/01/18 1812     Blood Culture, Routine No Growth to date     Blood Culture, Routine No Growth to date    Narrative:       From 2 different sites 30 minutes apart  Collection has been rescheduled by [facility] at 11/30/2018 14:47 Reason:   attempted to draw pt, both draws were unsuccessful. involved staffed   coordinator to assist with drawing patient.  Collection has been rescheduled by [facility] at 11/30/2018 14:47 Reason:   attempted to draw pt, both draws were unsuccessful. involved staffed   coordinator to assist with drawing patient.    Blood culture [[id number]] Collected:  11/30/18 1510    Order Status:  Completed Specimen:  Blood Updated:  12/01/18 1812     Blood Culture, Routine No Growth to date     Blood Culture, Routine No Growth to date    Narrative:       From 2 different sites 30 minutes apart  Collection has been rescheduled by [facility] at 11/30/2018 14:47 Reason:   attempted to draw pt, both draws were unsuccessful. involved staffed    coordinator to assist with drawing patient.  Collection has been rescheduled by [facility] at 11/30/2018 14:47 Reason:   attempted to draw pt, both draws were unsuccessful. involved staffed   coordinator to assist with drawing patient.    Blood culture [[id number]] Collected:  11/27/18 0449    Order Status:  Completed Specimen:  Blood from Peripheral, Antecubital, Right Updated:  11/30/18 1255     Blood Culture, Routine Gram stain aer bottle: Gram positive cocci in clusters resembling Staph     Blood Culture, Routine Positive results previously called     Blood Culture, Routine --     METHICILLIN RESISTANT STAPHYLOCOCCUS AUREUS  ID consult required at [facility] and [facility].  For susceptibility see order #[id number]      Narrative:       Draw from 2 different sites at least 30 minutes apart.    Blood culture [[id number]]  (Susceptibility) Collected:  11/27/18 0446    Order Status:  Completed Specimen:  Blood from Peripheral, Antecubital, Left Updated:  11/30/18 1255     Blood Culture, Routine Gram stain aer bottle: Gram positive cocci in clusters resembling Staph      Blood Culture, Routine Results called to and read back by: Ethel Miranda RN 11/28/2018  10:52     Blood Culture, Routine --     METHICILLIN RESISTANT STAPHYLOCOCCUS AUREUS  ID consult required at [facility] and [facility]   locations.      Narrative:       Draw from 2 different sites at least 30 minutes apart.    Blood culture [[id number]]  (Susceptibility) Collected:  11/24/18 1215    Order Status:  Completed Specimen:  Blood from Peripheral, Forearm, Right Updated:  11/30/18 0847     Blood Culture, Routine Gram stain aer bottle: Gram positive cocci in clusters resembling Staph     Blood Culture, Routine Gram stain clement bottle: Gram positive cocci in clusters resembling Staph      Blood Culture, Routine Results called to and read back by: Trinity Forbes RN 11/25/2018  06:35     Blood Culture, Routine --      METHICILLIN RESISTANT STAPHYLOCOCCUS AUREUS  ID consult required at [facility].      Blood culture [[id number]] Collected:  11/24/18 1220    Order Status:  Completed Specimen:  Blood from Peripheral, Wrist, Right Updated:  11/27/18 0950     Blood Culture, Routine Gram stain aer bottle: Gram positive cocci in clusters resembling Staph      Blood Culture, Routine Results called to and read back by: Trinity Forbes RN 11/25/2018  06:35     Blood Culture, Routine --     METHICILLIN RESISTANT STAPHYLOCOCCUS AUREUS  ID consult required at [facility].  For susceptibility see order #[id number]      Blood culture x two cultures. Draw prior to antibiotics. [[id number]] Collected:  11/23/18 1613    Order Status:  Completed Specimen:  Blood from Peripheral, Antecubital, Left Updated:  11/26/18 1013     Blood Culture, Routine Gram stain clement bottle: Gram positive cocci in clusters resembling Staph     Blood Culture, Routine Results called to and read back by: Sharlene Landon RN 11/24/2018  09:36     Blood Culture, Routine --     METHICILLIN RESISTANT STAPHYLOCOCCUS AUREUS  ID consult required at [facility] and [facility].  For susceptibility see order #[id number]      Narrative:       Aerobic and anaerobic    Blood culture x two cultures. Draw prior to antibiotics. [[id number]]  (Susceptibility) Collected:  11/23/18 1613    Order Status:  Completed Specimen:  Blood from Peripheral, Hand, Left Updated:  11/26/18 1012     Blood Culture, Routine Gram stain clement bottle: Gram positive cocci in clusters resembling Staph     Blood Culture, Routine Results called to and read back by: Sharlene Landon RN 11/24/2018  09:35     Blood Culture, Routine Gram stain aer bottle: Gram positive cocci in clusters resembling Staph     Blood Culture, Routine --     METHICILLIN RESISTANT STAPHYLOCOCCUS AUREUS  ID consult required at [facility]  [facility] and [facility]   locations.      Narrative:       Aerobic and anaerobic        I have reviewed all pertinent labs within the past 24 hours.    Estimated Creatinine Clearance: 20.8 mL/min (A) (based on SCr of 3.4 mg/dL (H)).    Diagnostic Results:  I have reviewed and interpreted all pertinent imaging results/findings within the past 24 hours.    Assessment and Plan:     68 year old male with PMHx significant for HFrEF secondary to non ischemic cardiomyopathy underwent Heartmate  3 LVAD implanted as DT therapy 7/27/17 and VT on amiodarone s/p ICD (with revision in 11/2017 complicated by pocket hematoma). Presents to ED today from clinic for worsening DLES infection and fever. Patient seen by VAD nurse and ID staff who feel admission to [facility] with IV antibiotics warranted. Patient also with fevers, chills, decreased appetite, scrotal boils with spontaneous purulent discharge/rupture, urinary incontinence and confusion at home. History provided by patient and supplemented by his wife. He denies recent travel or being around sick contacts. He is complaint with taking his home medications. Of note, he was recently hospitalized for GIB, history of ileal ulcer s/p intervention. Being followed also by EP by Dr. Winchester for possible future VT ablation. Pt denies recent ICD shocks as well as low flow alarms from his VAD.  In the ER, he was found febrile with Tmax of 103 deg F, doppler BP 60-70's and HR 70s. He was given 2 Liters NS IVFs, cefepime/vancomycin and resumed on home meds except aldactone. Blood cultures sent as well as gram stain and culture of specimen from drive line. CXR showed left small pleural effusion and CT abdomen performed that showed small volume fluid inferior aspect of LVAD along with small pericardial effusion. Labs notable for leukocytosis, mild INDIRA, elevated AST/ALT/T.bili, BNP and lactate 2.7 as well as CRP of 215. LDH was 294. VAD was interrogated and noted to have PI  "events. Pt was admitted to the ICU for further management/care.     * Severe sepsis    -Presented with fever, found to have leukocytosis, elevated lactate/CRP and mild INDIRA.  -Sources potentially: drive line infection (purulent discharge- sampled already and sent to lab; GS showed rare WBCs, CT abdomen showed small volume of fluid inferior aspect of VAD) vs skin infection (scrotal area with boils) vs UTI (pt with frequency, concentrated/dark urine).  -Derm consulted for management of scrotal skin issue. Continue Mucinprocin   -Appreciate Id Cx. Continue Daptomycin and ceftaroline.   -Blood cultures 11/27  with MRSA. Cultures since NGTD.   -GLENN with "Ragged fibrous deposits along lead in right atrium, likely right ventricular lead, with filimentous mobile echodensity measuring 7mm (image 6), cannot exclude endocarditis." Will consult EP once more stable.   -WBC count still significantly elevated.  -CVP ~8-10 this am. Will place A-line for BP monitoring.   -CT chest/abd/pelvis 11/30 with "large volume left-sided pleural effusion with associated near complete collapse of the left lung and rightward mediastinal shift".   -Appreciate PUL Cx. Holding off on thoracentesis until INR drops.        LVAD (left ventricular assist device) present    -HM3 5200 rpm, DT.  -Not on home diuretics. CVP ~10.  -INR supra therapeutic. Holding coumadin.   -ASA stopped on 12/22/17 after GI bleed and ileal ulcer   -LDH stable  -Norepi for MAP >60    Procedure: Device Interrogation Including analysis of device parameters  Current Settings: Ventricular Assist Device  Review of device function is stable/unstable stable    TXP LVAD INTERROGATIONS 12/2/2018 12/2/2018 12/2/2018 12/2/2018 12/2/2018 12/2/2018 12/2/2018   Type HeartMate3 HeartMate3 HeartMate3 HeartMate3 HeartMate3 HeartMate3 HeartMate3   Flow 3.6 4.0 3.7 3.8 3.7 3.8 3.7   Speed 5200 5200 5200 5200 5200 5200 5200   PI 3.7 3.3 4.7 4.6 4.2 4.8 4.4   Power ([place]) 3.5 3.4 3.5 3.5 3.6 3.4 " 3.4   LSL - - - - - - -   Pulsatility Intermittent pulse Intermittent pulse Intermittent pulse Intermittent pulse Intermittent pulse Intermittent pulse Intermittent pulse        INDIRA (acute kidney injury)    -May be secondary to hypotension during GLENN/sepsis  -Holding ACEI. CVP ~10. Hold off on diuretics for now.     Ventricular tachyarrhythmia    - EP consulted. Successfully ATP'd out of VT. Increased ATP burst intervals and lowered detection zone. Activated tachytherapies as well see EP note.   -Transitioned to PO amio.      Transaminitis    -likely due to sepsis vs effects of amiodarone  -will trend LFT's   -CT abdomen: liver is normal in size and attenuation without focal hepatic lesion.  There is gallbladder sludge.  The spleen, adrenal glands, and pancreas are unremarkable.   -holding home pravastatin     Iron deficiency anemia due to chronic blood loss    -c/w home oral iron supplementation tablets     Chronic combined systolic and diastolic congestive heart failure    -Holding home aldactone/Lisinopril.     Uninterrupted Critical Care/Counseling Time (not including procedures): 45mn    Kishore Paz NP  Heart Transplant  [facility]

## 2018-12-02 NOTE — ASSESSMENT & PLAN NOTE
-May be secondary to hypotension during GLENN/sepsis  -Holding ACEI. CVP ~10. Hold off on diuretics for now.

## 2018-12-02 NOTE — PLAN OF CARE
Problem: Patient Care Overview  Goal: Plan of Care Review  Outcome: Ongoing (interventions implemented as appropriate)  HMIII intermittently pulsatile, AICD in place, 100% paced on telemetry, Doppler 65 - 75, CVP 7 - 9, doppler pulses  AAOx4, drowsy, afebrile, up to chair  2L NC SpO2 93 - 100%  Regular low K 2000 FR diet tolerated  Pt voids per urinal, UOP > 30 ml/hr  Driveline dressing changed by family  Levophed gtt continued    VS and assessment per flowsheets

## 2018-12-02 NOTE — ASSESSMENT & PLAN NOTE
68 year old male with PMHx NICM s/p LVAD as DT 7/27/17, hx of staph epi bacteremia treated with 6 weeks of IV vancomycin 1/2018 admitted from LVAD clinic for drive line infection. 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  He was initially treated with vancomycin then transitioned to IV daptomycin and ceftaroline added yesterday.   - CT shows Interval development of a large volume left-sided pleural effusion with associated near complete collapse of the left lung and rightward mediastinal shift  - LVAD DLES +MRSA  - 2D echo negative for vegetations; GLENN + for vegetations   - WBC up to 51,000 today    Plan:  1. Continue daptomycin 10 mg/kg; monitor CPK  2. Continue ceftaroline 300mg q8h (renal adjusted) given pulmonary involvement  3. Recommend thoracentesis; pulm consulted; waiting until INR is lower  4. Monitor leukocytosis

## 2018-12-03 NOTE — PROGRESS NOTES
1105 - Pt's LVAD alarmed low flow and a line went flat. Pt went unresponsive. Pt only responded to vigorous, repeated stimulation by wincing of face. Attending team called to bedside, RT called to bedside. Levo increased to max dose, BP starting to elevate but pt still unresponsive. Team arrived at bedside and made decision to initiate CPR when aystole was apparent. Pt was pronounce at 1147 per Dr. Ambrosio after 25-30 minutes of CPR and failure to achieve ROSC (see code documentation for complete code details). Pt wife was present during code with hospital . Pt's wife and friends grieved at bedside. 1415 TIFF called, pt ruled out for tissue and eye donation. Death cert paperwork filed out and given to frank Santos. Body prepped and tagged by Abby CAMACHO RN. Pt sent to luis [facility], chart turned into unit secretary.

## 2018-12-03 NOTE — ANESTHESIA PROCEDURE NOTES
Intubation    Diagnosis: circulatory failure  Patient location during procedure: ICU  Procedure start time: 12/3/2018 11:15 AM  Procedure end time: 12/3/2018 11:20 AM  Staffing  Anesthesiologist: Avery Norman III, MD  Resident/CRNA: Sonal Xiao MD  Performed: resident/CRNA   Anesthesiologist was present at the time of the procedure.  Preanesthetic Checklist  Completed: patient identified, site marked, surgical consent, pre-op evaluation, timeout performed, IV checked, risks and benefits discussed, monitors and equipment checked and anesthesia consent given  Intubation  Indication: respiratory failure  Induction: patient pulseless, no induction agent given., laryngoscopy direct, Shahzad 3.  Endotracheal Tube: oral, 8.0 mm ID, cuffed (inflated to minimal occlusive pressure)  Attempts: 1, Grade I - full view of cords  Complicating Factors: none  Tube secured at 24 cm at the lips.  Findings post-intubation: bilateral breath sounds, positive ETCO2, atraumatic / condition of teeth unchanged  Position Confirmation: auscultation

## 2018-12-03 NOTE — PROGRESS NOTES
Patient  about an hour prior to being seen.  Discussed with wife and offered comfort.    Arsh Chao PA-C

## 2018-12-03 NOTE — PROGRESS NOTES
[id number]. Work of breathing noted to be increased/labored. Pt states he feels short of breath. RT notified. RT pulse ox applied - spo2 77%. NRB applied. ABG drawn. ABG results good, NC placed back on at 5L

## 2018-12-03 NOTE — PLAN OF CARE
Patient's INR is up to 4.3. Please re-consult pulmonology once INR is between 2-3 for thoracentesis if pleural effusion continues to persist.    Thank you for involving us in the care of this patient. We will sign off. Please call with any questions.    Annamaria Avalos MD  [facility] PCCM Fellow, PGY 5  [facility]  Pager: [phone]

## 2018-12-03 NOTE — SUBJECTIVE & OBJECTIVE
Interval History: patient more SOB with lying flat this morning, did not eat greens yesterday and INR 4    Continuous Infusions:   norepinephrine bitartrate-D5W 0.06 mcg/kg/min (12/03/18 0701)     Scheduled Meds:   amiodarone  400 mg Oral BID    ceftaroline fosamil  300 mg Intravenous Q12H    DAPTOmycin (CUBICIN)  IV  10 mg/kg Intravenous Q48H    dronabinol  5 mg Oral BID    ferrous sulfate  325 mg Oral TID    mupirocin   Topical (Top) Daily    pantoprazole  40 mg Oral BID    phytonadione  5 mg Oral Once     PRN Meds:acetaminophen, polyethylene glycol, traMADol    Review of patient's allergies indicates:   Allergen Reactions    Asa [aspirin] Other (See Comments)     Bleeding ulcer     Objective:     Vital Signs (Most Recent):  Temp: 97.5 °F (36.4 °C) (12/03/18 0701)  Pulse: 80 (12/03/18 0900)  Resp: (!) 32 (12/03/18 0836)  BP: (!) 90/0 (12/03/18 0830)  SpO2: (!) 77 % (12/03/18 0732) Vital Signs (24h Range):  Temp:  [97.5 °F (36.4 °C)-98.8 °F (37.1 °C)] 97.5 °F (36.4 °C)  Pulse:  [] 80  Resp:  [25-42] 32  SpO2:  [77 %-97 %] 77 %  BP: (48-90)/(0) 90/0  Arterial Line BP: ()/(50-79) 96/74     Patient Vitals for the past 72 hrs (Last 3 readings):   Weight   11/30/18 1845 76.6 kg (168 lb 14 oz)     Body mass index is 24.94 kg/m².      Intake/Output Summary (Last 24 hours) at 12/3/2018 1052  Last data filed at 12/3/2018 0701  Gross per 24 hour   Intake 783.2 ml   Output 500 ml   Net 283.2 ml       Hemodynamic Parameters:  CVP:  [9 mmHg-10 mmHg] 10 mmHg      Physical Exam   Constitutional: He is oriented to person, place, and time. No distress.   HENT:   Mouth/Throat: Oropharynx is clear and moist.   Eyes: Pupils are equal, round, and reactive to light.   Neck: Neck supple.   Cardiovascular:   Normal VAD hum    Pulmonary/Chest: Effort normal.   Decreased BS on L side and in BLL   Abdominal: Soft. He exhibits no distension and no mass. There is no tenderness. There is no guarding.   Genitourinary:    Genitourinary Comments: Three boils present on testicles.  2 of which were openly draining   Musculoskeletal: He exhibits no edema.   Neurological: He is alert and oriented to person, place, and time. No cranial nerve deficit.   Skin: Skin is warm and dry.   Psychiatric: He has a normal mood and affect. His behavior is normal.       Significant Labs:  CBC:  Recent Labs   Lab 12/01/18  0416 12/02/18  0430 12/03/18  0500   WBC 52.45*  52.45* 51.76* 37.56*   RBC 3.71*  3.71* 4.06* 3.89*   HGB 8.5*  8.5* 9.6* 9.3*   HCT 27.2*  27.2* 29.5* 27.5*     331 383* 373*   MCV 73*  73* 73* 71*   MCH 22.9*  22.9* 23.6* 23.9*   MCHC 31.3*  31.3* 32.5 33.8     BNP:  Recent Labs   Lab 11/28/18  0301 11/30/18  0500 12/03/18  0500   BNP 1,430*  1,430* 1,005* 719*     CMP:  Recent Labs   Lab 11/28/18  0301  11/30/18  0500  12/01/18  0416 12/02/18  0430 12/03/18  0500   GLU 95   < > 158*   < > 113* 135* 136*   CALCIUM 8.1*   < > 8.0*   < > 7.8* 7.9* 7.8*   ALBUMIN 2.2*  2.2*  --  2.2*  --   --   --  2.1*   PROT 5.7*  5.7*  --  6.0  --   --   --  6.3   *   < > 131*   < > 131* 127* 127*   K 4.1   < > 4.6   < > 5.0 5.0 5.1   CO2 24   < > 19*   < > 19* 18* 15*      < > 103   < > 102 100 101   BUN 20   < > 30*   < > 48* 59* 69*   CREATININE 0.9   < > 2.3*   < > 3.3* 3.4* 3.3*   ALKPHOS 93  93  --  105  --   --   --  93   ALT 84*  84*  --  82*  --   --   --  73*   AST 91*  91*  --  106*  --   --   --  91*   BILITOT 1.5*  1.5*  --  2.3*  --   --   --  1.9*    < > = values in this interval not displayed.      Coagulation:   Recent Labs   Lab 12/01/18 0416 12/02/18  0430 12/03/18  0500   INR 3.9* 4.3* 4.2*   APTT 36.1*  36.1* 40.0* 37.5*     LDH:  Recent Labs   Lab 12/01/18 0416 12/02/18  0430 12/03/18  0500   * 281* 317*     Microbiology:  Microbiology Results (last 7 days)     Procedure Component Value Units Date/Time    Blood culture [[id number]] Collected:  11/29/18 0601    Order Status:   Completed Specimen:  Blood Updated:  12/03/18 0812     Blood Culture, Routine No Growth to date     Blood Culture, Routine No Growth to date     Blood Culture, Routine No Growth to date     Blood Culture, Routine No Growth to date     Blood Culture, Routine No Growth to date    Blood culture [[id number]] Collected:  11/29/18 0601    Order Status:  Completed Specimen:  Blood Updated:  12/03/18 0812     Blood Culture, Routine No Growth to date     Blood Culture, Routine No Growth to date     Blood Culture, Routine No Growth to date     Blood Culture, Routine No Growth to date     Blood Culture, Routine No Growth to date    Blood culture [[id number]] Collected:  11/30/18 1649    Order Status:  Completed Specimen:  Blood Updated:  12/02/18 1812     Blood Culture, Routine No Growth to date     Blood Culture, Routine No Growth to date     Blood Culture, Routine No Growth to date    Narrative:       From 2 different sites 30 minutes apart  Collection has been rescheduled by JWB at 11/30/2018 14:47 Reason:   attempted to draw pt, both draws were unsuccessful. involved staffed   coordinator to assist with drawing patient.  Collection has been rescheduled by JWB at 11/30/2018 14:47 Reason:   attempted to draw pt, both draws were unsuccessful. involved staffed   coordinator to assist with drawing patient.    Blood culture [[id number]] Collected:  11/30/18 1510    Order Status:  Completed Specimen:  Blood Updated:  12/02/18 1812     Blood Culture, Routine No Growth to date     Blood Culture, Routine No Growth to date     Blood Culture, Routine No Growth to date    Narrative:       From 2 different sites 30 minutes apart  Collection has been rescheduled by JWB at 11/30/2018 14:47 Reason:   attempted to draw pt, both draws were unsuccessful. involved staffed   coordinator to assist with drawing patient.  Collection has been rescheduled by JWB at 11/30/2018 14:47 Reason:   attempted to draw pt, both draws were unsuccessful. involved  staffed   coordinator to assist with drawing patient.    Blood culture [[id number]] Collected:  11/27/18 0449    Order Status:  Completed Specimen:  Blood from Peripheral, Antecubital, Right Updated:  11/30/18 1255     Blood Culture, Routine Gram stain aer bottle: Gram positive cocci in clusters resembling Staph     Blood Culture, Routine Positive results previously called     Blood Culture, Routine --     METHICILLIN RESISTANT STAPHYLOCOCCUS AUREUS  ID consult required at [facility] and [facility].  For susceptibility see order #[id number]      Narrative:       Draw from 2 different sites at least 30 minutes apart.    Blood culture [[id number]]  (Susceptibility) Collected:  11/27/18 0446    Order Status:  Completed Specimen:  Blood from Peripheral, Antecubital, Left Updated:  11/30/18 1255     Blood Culture, Routine Gram stain aer bottle: Gram positive cocci in clusters resembling Staph      Blood Culture, Routine Results called to and read back by: Ethel Miranda RN 11/28/2018  10:52     Blood Culture, Routine --     METHICILLIN RESISTANT STAPHYLOCOCCUS AUREUS  ID consult required at [facility] and [facility]   locations.      Narrative:       Draw from 2 different sites at least 30 minutes apart.    Blood culture [[id number]]  (Susceptibility) Collected:  11/24/18 1215    Order Status:  Completed Specimen:  Blood from Peripheral, Forearm, Right Updated:  11/30/18 0847     Blood Culture, Routine Gram stain aer bottle: Gram positive cocci in clusters resembling Staph     Blood Culture, Routine Gram stain clement bottle: Gram positive cocci in clusters resembling Staph      Blood Culture, Routine Results called to and read back by: Trinity Forbes RN 11/25/2018  06:35     Blood Culture, Routine --     METHICILLIN RESISTANT STAPHYLOCOCCUS AUREUS  ID consult required at [facility] and [facility].      Blood culture [[id number]] Collected:  11/24/18 1220     Order Status:  Completed Specimen:  Blood from Peripheral, Wrist, Right Updated:  11/27/18 0950     Blood Culture, Routine Gram stain aer bottle: Gram positive cocci in clusters resembling Staph      Blood Culture, Routine Results called to and read back by: Trinity Forbes RN 11/25/2018  06:35     Blood Culture, Routine --     METHICILLIN RESISTANT STAPHYLOCOCCUS AUREUS  ID consult required at [facility] and [facility].  For susceptibility see order #[id number]            I have reviewed all pertinent labs within the past 24 hours.    Estimated Creatinine Clearance: 21.4 mL/min (A) (based on SCr of 3.3 mg/dL (H)).    Diagnostic Results:  I have reviewed and interpreted all pertinent imaging results/findings within the past 24 hours.

## 2018-12-03 NOTE — PROGRESS NOTES
Patient intubated at bedside for airway protection during a code.  Patient intubated with size 8.0 ETT placed 24 @ lips.  ETT secured with commercial tube pepe in place.  Patient ventilated via AMBU bag, then shortly after placed patient on MV on documented settings.  Will continue to monitor.

## 2018-12-03 NOTE — HOSPITAL COURSE
Patient started on broad spectrum antibiotics and consulted ID. He then underwent GLENN with Ragged fibrous deposits along lead in right atrium, likely right ventricular lead, with filimentous mobile echodensity measuring 7mm (image 6), cannot exclude endocarditis. Also with large pleural effusion. On 12/3/2018 called to bedside for LOC and low flows. CPR performed as patient became asystolic and pressor support started as well as intubation. Time of death called at 11:47 AM 12/3/2018

## 2018-12-03 NOTE — PROGRESS NOTES
Received call that patient was having LFA and low BP. Was present at bedside with Dr. Hebert Gupta during code. See [employer] documentation. Emotional support given to wife during this time.

## 2018-12-03 NOTE — PROGRESS NOTES
Doppler pressure increased after levo increased to 0.08 from 0.02. Notified PA and Dr. Ambrosio when rounding at 0830 about doppler pressures, Dr. Ambrosio more concerned with A line pressure instead of doppler. Continue to titrate to arterial pressure MAPs > 60.

## 2018-12-03 NOTE — PROGRESS NOTES
12/02/2018  Jin Ham    Current provider:  Mohit Ambrosio MD      I, Jin Ham, rounded on Suman Hayden to ensure all mechanical assist device settings (IABP or VAD) were appropriate and all parameters were within limits.  I was able to ensure all back up equipment was present, the staff had no issues, and the Perfusion Department daily rounding was complete.    7:18 PM

## 2018-12-03 NOTE — PROGRESS NOTES
[facility]  Heart Transplant  Progress Note    Patient Name: Suman Hayden  MRN: [id number]  Admission Date: 11/23/2018  Hospital Length of Stay: 10 days  Attending Physician: Mohit Ambrosio MD  Primary Care Provider: Joe Ernst MD  Principal Problem:Severe sepsis    Subjective:     Interval History: patient more SOB with lying flat this morning, did not eat greens yesterday and INR 4    Continuous Infusions:   norepinephrine bitartrate-D5W 0.06 mcg/kg/min (12/03/18 0701)     Scheduled Meds:   amiodarone  400 mg Oral BID    ceftaroline fosamil  300 mg Intravenous Q12H    DAPTOmycin (CUBICIN)  IV  10 mg/kg Intravenous Q48H    dronabinol  5 mg Oral BID    ferrous sulfate  325 mg Oral TID    mupirocin   Topical (Top) Daily    pantoprazole  40 mg Oral BID    phytonadione  5 mg Oral Once     PRN Meds:acetaminophen, polyethylene glycol, traMADol    Review of patient's allergies indicates:   Allergen Reactions    Asa [aspirin] Other (See Comments)     Bleeding ulcer     Objective:     Vital Signs (Most Recent):  Temp: 97.5 °F (36.4 °C) (12/03/18 0701)  Pulse: 80 (12/03/18 0900)  Resp: (!) 32 (12/03/18 0836)  BP: (!) 90/0 (12/03/18 0830)  SpO2: (!) 77 % (12/03/18 0732) Vital Signs (24h Range):  Temp:  [97.5 °F (36.4 °C)-98.8 °F (37.1 °C)] 97.5 °F (36.4 °C)  Pulse:  [] 80  Resp:  [25-42] 32  SpO2:  [77 %-97 %] 77 %  BP: (48-90)/(0) 90/0  Arterial Line BP: ()/(50-79) 96/74     Patient Vitals for the past 72 hrs (Last 3 readings):   Weight   11/30/18 1845 76.6 kg (168 lb 14 oz)     Body mass index is 24.94 kg/m².      Intake/Output Summary (Last 24 hours) at 12/3/2018 1052  Last data filed at 12/3/2018 0701  Gross per 24 hour   Intake 783.2 ml   Output 500 ml   Net 283.2 ml       Hemodynamic Parameters:  CVP:  [9 mmHg-10 mmHg] 10 mmHg      Physical Exam   Constitutional: He is oriented to person, place, and time. No distress.   HENT:   Mouth/Throat: Oropharynx is clear and  moist.   Eyes: Pupils are equal, round, and reactive to light.   Neck: Neck supple.   Cardiovascular:   Normal VAD hum    Pulmonary/Chest: Effort normal.   Decreased BS on L side and in BLL   Abdominal: Soft. He exhibits no distension and no mass. There is no tenderness. There is no guarding.   Genitourinary:   Genitourinary Comments: Three boils present on testicles.  2 of which were openly draining   Musculoskeletal: He exhibits no edema.   Neurological: He is alert and oriented to person, place, and time. No cranial nerve deficit.   Skin: Skin is warm and dry.   Psychiatric: He has a normal mood and affect. His behavior is normal.       Significant Labs:  CBC:  Recent Labs   Lab 12/01/18  0416 12/02/18  0430 12/03/18  0500   WBC 52.45*  52.45* 51.76* 37.56*   RBC 3.71*  3.71* 4.06* 3.89*   HGB 8.5*  8.5* 9.6* 9.3*   HCT 27.2*  27.2* 29.5* 27.5*     331 383* 373*   MCV 73*  73* 73* 71*   MCH 22.9*  22.9* 23.6* 23.9*   MCHC 31.3*  31.3* 32.5 33.8     BNP:  Recent Labs   Lab 11/28/18  0301 11/30/18  0500 12/03/18  0500   BNP 1,430*  1,430* 1,005* 719*     CMP:  Recent Labs   Lab 11/28/18  0301  11/30/18  0500  12/01/18  0416 12/02/18  0430 12/03/18  0500   GLU 95   < > 158*   < > 113* 135* 136*   CALCIUM 8.1*   < > 8.0*   < > 7.8* 7.9* 7.8*   ALBUMIN 2.2*  2.2*  --  2.2*  --   --   --  2.1*   PROT 5.7*  5.7*  --  6.0  --   --   --  6.3   *   < > 131*   < > 131* 127* 127*   K 4.1   < > 4.6   < > 5.0 5.0 5.1   CO2 24   < > 19*   < > 19* 18* 15*      < > 103   < > 102 100 101   BUN 20   < > 30*   < > 48* 59* 69*   CREATININE 0.9   < > 2.3*   < > 3.3* 3.4* 3.3*   ALKPHOS 93  93  --  105  --   --   --  93   ALT 84*  84*  --  82*  --   --   --  73*   AST 91*  91*  --  106*  --   --   --  91*   BILITOT 1.5*  1.5*  --  2.3*  --   --   --  1.9*    < > = values in this interval not displayed.      Coagulation:   Recent Labs   Lab 12/01/18  0416 12/02/18  0430 12/03/18  0500   INR 3.9* 4.3*  4.2*   APTT 36.1*  36.1* 40.0* 37.5*     LDH:  Recent Labs   Lab 12/01/18  0416 12/02/18  0430 12/03/18  0500   * 281* 317*     Microbiology:  Microbiology Results (last 7 days)     Procedure Component Value Units Date/Time    Blood culture [[id number]] Collected:  11/29/18 0601    Order Status:  Completed Specimen:  Blood Updated:  12/03/18 0812     Blood Culture, Routine No Growth to date     Blood Culture, Routine No Growth to date     Blood Culture, Routine No Growth to date     Blood Culture, Routine No Growth to date     Blood Culture, Routine No Growth to date    Blood culture [[id number]] Collected:  11/29/18 0601    Order Status:  Completed Specimen:  Blood Updated:  12/03/18 0812     Blood Culture, Routine No Growth to date     Blood Culture, Routine No Growth to date     Blood Culture, Routine No Growth to date     Blood Culture, Routine No Growth to date     Blood Culture, Routine No Growth to date    Blood culture [[id number]] Collected:  11/30/18 1649    Order Status:  Completed Specimen:  Blood Updated:  12/02/18 1812     Blood Culture, Routine No Growth to date     Blood Culture, Routine No Growth to date     Blood Culture, Routine No Growth to date    Narrative:       From 2 different sites 30 minutes apart  Collection has been rescheduled by JWB at 11/30/2018 14:47 Reason:   attempted to draw pt, both draws were unsuccessful. involved staffed   coordinator to assist with drawing patient.  Collection has been rescheduled by JWB at 11/30/2018 14:47 Reason:   attempted to draw pt, both draws were unsuccessful. involved staffed   coordinator to assist with drawing patient.    Blood culture [[id number]] Collected:  11/30/18 1510    Order Status:  Completed Specimen:  Blood Updated:  12/02/18 1812     Blood Culture, Routine No Growth to date     Blood Culture, Routine No Growth to date     Blood Culture, Routine No Growth to date    Narrative:       From 2 different sites 30 minutes apart  Collection  has been rescheduled by ABY at 11/30/2018 14:47 Reason:   attempted to draw pt, both draws were unsuccessful. involved staffed   coordinator to assist with drawing patient.  Collection has been rescheduled by JWELIUD at 11/30/2018 14:47 Reason:   attempted to draw pt, both draws were unsuccessful. involved staffed   coordinator to assist with drawing patient.    Blood culture [[id number]] Collected:  11/27/18 0449    Order Status:  Completed Specimen:  Blood from Peripheral, Antecubital, Right Updated:  11/30/18 1255     Blood Culture, Routine Gram stain aer bottle: Gram positive cocci in clusters resembling Staph     Blood Culture, Routine Positive results previously called     Blood Culture, Routine --     METHICILLIN RESISTANT STAPHYLOCOCCUS AUREUS  ID consult required at [facility] and [facility].  For susceptibility see order #[id number]      Narrative:       Draw from 2 different sites at least 30 minutes apart.    Blood culture [[id number]]  (Susceptibility) Collected:  11/27/18 0446    Order Status:  Completed Specimen:  Blood from Peripheral, Antecubital, Left Updated:  11/30/18 1255     Blood Culture, Routine Gram stain aer bottle: Gram positive cocci in clusters resembling Staph      Blood Culture, Routine Results called to and read back by: Ethel Miranda RN 11/28/2018  10:52     Blood Culture, Routine --     METHICILLIN RESISTANT STAPHYLOCOCCUS AUREUS  ID consult required at [facility] and [facility]   locations.      Narrative:       Draw from 2 different sites at least 30 minutes apart.    Blood culture [[id number]]  (Susceptibility) Collected:  11/24/18 1215    Order Status:  Completed Specimen:  Blood from Peripheral, Forearm, Right Updated:  11/30/18 0847     Blood Culture, Routine Gram stain aer bottle: Gram positive cocci in clusters resembling Staph     Blood Culture, Routine Gram stain clement bottle: Gram positive cocci in clusters resembling Staph      Blood  Culture, Routine Results called to and read back by: Trinity Forbes RN 11/25/2018  06:35     Blood Culture, Routine --     METHICILLIN RESISTANT STAPHYLOCOCCUS AUREUS  ID consult required at [facility] and [facility]   locations.      Blood culture [[id number]] Collected:  11/24/18 1220    Order Status:  Completed Specimen:  Blood from Peripheral, Wrist, Right Updated:  11/27/18 0950     Blood Culture, Routine Gram stain aer bottle: Gram positive cocci in clusters resembling Staph      Blood Culture, Routine Results called to and read back by: Trinity Forbes RN 11/25/2018  06:35     Blood Culture, Routine --     METHICILLIN RESISTANT STAPHYLOCOCCUS AUREUS  ID consult required at [facility] and [facility].  For susceptibility see order #[id number]            I have reviewed all pertinent labs within the past 24 hours.    Estimated Creatinine Clearance: 21.4 mL/min (A) (based on SCr of 3.3 mg/dL (H)).    Diagnostic Results:  I have reviewed and interpreted all pertinent imaging results/findings within the past 24 hours.    Assessment and Plan:     68 year old male with PMHx significant for HFrEF secondary to non ischemic cardiomyopathy underwent Heartmate  3 LVAD implanted as DT therapy 7/27/17 and VT on amiodarone s/p ICD (with revision in 11/2017 complicated by pocket hematoma). Presents to ED today from clinic for worsening DLES infection and fever. Patient seen by VAD nurse and ID staff who feel admission to [facility] with IV antibiotics warranted. Patient also with fevers, chills, decreased appetite, scrotal boils with spontaneous purulent discharge/rupture, urinary incontinence and confusion at home. History provided by patient and supplemented by his wife. He denies recent travel or being around sick contacts. He is complaint with taking his home medications. Of note, he was recently hospitalized for GIB, history of ileal ulcer s/p intervention. Being followed also by EP by  "Dr. Winchester for possible future VT ablation. Pt denies recent ICD shocks as well as low flow alarms from his VAD.  In the ER, he was found febrile with Tmax of 103 deg F, doppler BP 60-70's and HR 70s. He was given 2 Liters NS IVFs, cefepime/vancomycin and resumed on home meds except aldactone. Blood cultures sent as well as gram stain and culture of specimen from drive line. CXR showed left small pleural effusion and CT abdomen performed that showed small volume fluid inferior aspect of LVAD along with small pericardial effusion. Labs notable for leukocytosis, mild INDIRA, elevated AST/ALT/T.bili, BNP and lactate 2.7 as well as CRP of 215. LDH was 294. VAD was interrogated and noted to have PI events. Pt was admitted to the ICU for further management/care.     * Severe sepsis    -Presented with fever, found to have leukocytosis, elevated lactate/CRP and mild INDIRA.  -Sources potentially: drive line infection (purulent discharge- sampled already and sent to lab; GS showed rare WBCs, CT abdomen showed small volume of fluid inferior aspect of VAD) vs skin infection (scrotal area with boils) vs UTI (pt with frequency, concentrated/dark urine).  -Derm consulted for management of scrotal skin issue. Continue Mucinprocin   -Appreciate Id Cx. Continue Daptomycin and ceftaroline.   -Blood cultures 11/27 with MRSA. Cultures since NGTD.   -GLENN with "Ragged fibrous deposits along lead in right atrium, likely right ventricular lead, with filimentous mobile echodensity measuring 7mm (image 6), cannot exclude endocarditis." Will consult EP once more stable.   -WBC count still elevated  -CT chest/abd/pelvis 11/30 with "large volume left-sided pleural effusion with associated near complete collapse of the left lung and rightward mediastinal shift".   -Appreciate PUL Cx. Holding off on thoracentesis until INR drops.        Transaminitis    -likely due to sepsis vs effects of amiodarone  -will trend LFT's   -CT abdomen: liver is normal " in size and attenuation without focal hepatic lesion.  There is gallbladder sludge.  The spleen, adrenal glands, and pancreas are unremarkable.   -holding home pravastatin     INDIRA (acute kidney injury)    -May be secondary to hypotension during GLENN/sepsis  -Holding ACEI. Will give one dose of IV push lasix this morning      Iron deficiency anemia due to chronic blood loss    -c/w home oral iron supplementation tablets     LVAD (left ventricular assist device) present    -HM3 5200 rpm, DT.  -CVP 10  -INR supra therapeutic. Holding coumadin. Will give one dose of vitamin k   -ASA stopped on 12/22/17 after GI bleed and ileal ulcer   -LDH stable  -Norepi for MAP >60    Procedure: Device Interrogation Including analysis of device parameters  Current Settings: Ventricular Assist Device  Review of device function is stable/unstable stable    TXP LVAD INTERROGATIONS 12/3/2018 12/3/2018 12/3/2018 12/3/2018 12/3/2018 12/3/2018 12/3/2018   Type HeartMate3 HeartMate3 HeartMate3 HeartMate3 HeartMate3 HeartMate3 HeartMate3   Flow 3.8 3.6 3.4 3.2 3.6 3.3 3.3   Speed 5250 5200 5200 5200 5200 5200 5200   PI 8.7 8.2 8.6 8.6 8.5 8.4 8.5   Power ([place]) 3.4 3.5 3.5 3.6 3.5 3.5 3.5   LSL - - - - - - -   Pulsatility Intermittent pulse Intermittent pulse Intermittent pulse Intermittent pulse Intermittent pulse Intermittent pulse Intermittent pulse        Chronic combined systolic and diastolic congestive heart failure    -Holding home aldactone/Lisinopril.     Ventricular tachyarrhythmia    - EP consulted. Successfully ATP'd out of VT. Increased ATP burst intervals and lowered detection zone. Activated tachytherapies as well see EP note.   -Transitioned to PO amio.        Uninterrupted Critical Care/Counseling Time (not including procedures): 30 minutes    MELISSA Long  Heart Transplant  [facility]

## 2018-12-03 NOTE — EICU
eICU called into room by bedside team. Pt hypotensive with decreased mental status at this time. Hebert PARIKH at bedside. 1 amp Epinepherine given IVP. Blood siugar <20 on accucheck. 1 amp D50 given. Epinepherine gtt started at 11:18 at 0.04 mcg/kg/min per Dr. Ambrosio's orders. 1 amp Sodium Bicarb given per Hebert PARIKH's orders. Anesthesia at bedside pt intubated at 11:24, 1 mg Atropine given IVP at this time pt lost a pulse and CPR was started. Please see Code [employer] documentation.

## 2018-12-03 NOTE — PROGRESS NOTES
Dopper pressure low this AM and double checked by second RN. Reported findings to Alonso TORRES.

## 2018-12-03 NOTE — DISCHARGE SUMMARY
[facility]  Heart Transplant  Discharge Summary      Patient Name: Suman Hayden  MRN: [id number]  Admission Date: 11/23/2018  Hospital Length of Stay: 10 days  Discharge Date and Time: 12/03/2018 11:57 AM  Attending Physician: Mohit Ambrosio MD   Discharging Provider: MELISSA Long  Primary Care Provider: Joe Ernst MD     HPI: 68 year old male with PMHx significant for HFrEF secondary to non ischemic cardiomyopathy underwent Heartmate  3 LVAD implanted as DT therapy 7/27/17 and VT on amiodarone s/p ICD (with revision in 11/2017 complicated by pocket hematoma). Presents to ED today from clinic for worsening DLES infection and fever. Patient seen by VAD nurse and ID staff who feel admission to [facility] with IV antibiotics warranted. Patient also with fevers, chills, decreased appetite, scrotal boils with spontaneous purulent discharge/rupture, urinary incontinence and confusion at home. History provided by patient and supplemented by his wife. He denies recent travel or being around sick contacts. He is complaint with taking his home medications. Of note, he was recently hospitalized for GIB, history of ileal ulcer s/p intervention. Being followed also by EP by Dr. Winchester for possible future VT ablation. Pt denies recent ICD shocks as well as low flow alarms from his VAD.  In the ER, he was found febrile with Tmax of 103 deg F, doppler BP 60-70's and HR 70s. He was given 2 Liters NS IVFs, cefepime/vancomycin and resumed on home meds except aldactone. Blood cultures sent as well as gram stain and culture of specimen from drive line. CXR showed left small pleural effusion and CT abdomen performed that showed small volume fluid inferior aspect of LVAD along with small pericardial effusion. Labs notable for leukocytosis, mild INDIRA, elevated AST/ALT/T.bili, BNP and lactate 2.7 as well as CRP of 215. LDH was 294. VAD was interrogated and noted to have PI events. Pt was admitted to the ICU  for further management/care.     Procedure(s) (LRB):  ECHOCARDIOGRAM,TRANSESOPHAGEAL (N/A)     Hospital Course: Patient started on broad spectrum antibiotics and consulted ID. He then underwent GLENN with Ragged fibrous deposits along lead in right atrium, likely right ventricular lead, with filimentous mobile echodensity measuring 7mm (image 6), cannot exclude endocarditis. Also with large pleural effusion. On 12/3/2018 called to bedside for LOC and low flows. CPR performed as patient became asystolic and pressor support started as well as intubation. Time of death called at 11:47 AM 12/3/2018    Consults (From admission, onward)        Status Ordering Provider     Inpatient consult to Dermatology  Once     Provider:  (Not yet assigned)    Completed BUSHRA KNIGHT     Inpatient consult to Electrophysiology  Once     Provider:  (Not yet assigned)    Completed ALO FRANKS     Inpatient consult to Infectious Diseases  Once     Provider:  (Not yet assigned)    Completed MARY ELLEN GARCIA     Inpatient consult to PICC team ([facility])  Once     Provider:  (Not yet assigned)    Completed ALO FRANKS     Inpatient consult to Pulmonology  Once     Provider:  (Not yet assigned)    Completed CALI CHAMBERS     Inpatient consult to Registered Dietitian/Nutritionist  Once     Provider:  (Not yet assigned)    Completed MARY ELLEN GARCIA          Significant Diagnostic Studies: Labs:   BMP:   Recent Labs   Lab 12/02/18  0430 12/03/18  0500   * 136*   * 127*   K 5.0 5.1    101   CO2 18* 15*   BUN 59* 69*   CREATININE 3.4* 3.3*   CALCIUM 7.9* 7.8*   MG 2.2 2.4       Pending Diagnostic Studies:     Procedure Component Value Units Date/Time    APTT [[id number]] Collected:  12/03/18 1128    Order Status:  Sent Lab Status:  In process Updated:  12/03/18 1141    Specimen:  Blood     CBC auto differential [[id number]] Collected:  12/03/18 1128    Order Status:  Sent Lab Status:  In process Updated:   12/03/18 1141    Specimen:  Blood     Comprehensive metabolic panel [[id number]] Collected:  12/03/18 1128    Order Status:  Sent Lab Status:  In process Updated:  12/03/18 1141    Specimen:  Blood     Lactic acid, plasma [[id number]] Collected:  12/03/18 1128    Order Status:  Sent Lab Status:  In process Updated:  12/03/18 1141    Specimen:  Blood     Protime-INR [[id number]] Collected:  12/03/18 1128    Order Status:  Sent Lab Status:  In process Updated:  12/03/18 1141    Specimen:  Blood     Transthoracic echo (TTE) complete (Cupid Only) [[id number]]     Order Status:  Sent Lab Status:  No result     Urinalysis, Reflex to Urine Culture Urine, Clean Catch [[id number]] Collected:  11/23/18 2213    Order Status:  Sent Lab Status:  In process Updated:  11/23/18 2213    Specimen:  Urine     X-Ray Chest 1 View [[id number]]     Order Status:  Sent Lab Status:  No result         Final Active Diagnoses:    Diagnosis Date Noted POA    PRINCIPAL PROBLEM:  Severe sepsis [A41.9, R65.20] 11/23/2018 Yes    Pleural effusion [J90] 12/01/2018 Yes    Scrotum, abscess [N49.2] 11/24/2018 Unknown    Transaminitis [R74.0] 11/23/2018 Unknown    INDIRA (acute kidney injury) [N17.9] 10/25/2018 Yes    Iron deficiency anemia due to chronic blood loss [D50.0] 10/15/2018 Yes    MRSA bacteremia [R78.81] 08/18/2017 Yes    LVAD (left ventricular assist device) present [Z95.811] 07/29/2017 Not Applicable    CHF (congestive heart failure) [I50.9]  Yes    Chronic combined systolic and diastolic congestive heart failure [I50.42] 07/07/2017 Yes    Ventricular tachyarrhythmia [I47.2] 07/05/2017 Yes    Nonischemic cardiomyopathy [I42.8] 06/25/2017 Yes      Problems Resolved During this Admission:    Diagnosis Date Noted Date Resolved POA    History of adverse effect of venous thromboembolism (VTE) prophylaxis [Z88.8] 11/23/2018 11/23/2018 Not Applicable    Essential hypertension [I10] 07/05/2017 11/30/2018 Yes      Discharged Condition:          Patient Instructions:   No discharge procedures on file.  Medications:  None (patient  at medical facility)    MELISSA Long  Heart Transplant  [facility]

## 2018-12-03 NOTE — SIGNIFICANT EVENT
Dr. Ambrosio and I called to bedside at 11:05 with low flow and LOC. Patient then became asystolic. Started on pressor support, Intubated, and CPR performed for 25 minutes. Wife at bedside. Time of death called at 11:47 am

## 2018-12-03 NOTE — PROGRESS NOTES
12/03/2018  Arsh Rogers    Current provider:  Mohit Ambrosio MD      I, Arsh Rogers, rounded on Suman Hayden to ensure all mechanical assist device settings (IABP or VAD) were appropriate and all parameters were within limits.  I was able to ensure all back up equipment was present, the staff had no issues, and the Perfusion Department daily rounding was complete.    6:56 AM

## 2018-12-03 NOTE — ASSESSMENT & PLAN NOTE
-HM3 5200 rpm, DT.  -CVP 10  -INR supra therapeutic. Holding coumadin. Will give one dose of vitamin k   -ASA stopped on 12/22/17 after GI bleed and ileal ulcer   -LDH stable  -Norepi for MAP >60    Procedure: Device Interrogation Including analysis of device parameters  Current Settings: Ventricular Assist Device  Review of device function is stable/unstable stable    TXP LVAD INTERROGATIONS 12/3/2018 12/3/2018 12/3/2018 12/3/2018 12/3/2018 12/3/2018 12/3/2018   Type HeartMate3 HeartMate3 HeartMate3 HeartMate3 HeartMate3 HeartMate3 HeartMate3   Flow 3.8 3.6 3.4 3.2 3.6 3.3 3.3   Speed 5250 5200 5200 5200 5200 5200 5200   PI 8.7 8.2 8.6 8.6 8.5 8.4 8.5   Power ([place]) 3.4 3.5 3.5 3.6 3.5 3.5 3.5   LSL - - - - - - -   Pulsatility Intermittent pulse Intermittent pulse Intermittent pulse Intermittent pulse Intermittent pulse Intermittent pulse Intermittent pulse

## 2018-12-03 NOTE — ASSESSMENT & PLAN NOTE
-May be secondary to hypotension during GLENN/sepsis  -Holding ACEI. Will give one dose of IV push lasix this morning

## 2018-12-03 NOTE — ASSESSMENT & PLAN NOTE
"-Presented with fever, found to have leukocytosis, elevated lactate/CRP and mild INDIRA.  -Sources potentially: drive line infection (purulent discharge- sampled already and sent to lab; GS showed rare WBCs, CT abdomen showed small volume of fluid inferior aspect of VAD) vs skin infection (scrotal area with boils) vs UTI (pt with frequency, concentrated/dark urine).  -Derm consulted for management of scrotal skin issue. Continue Mucinprocin   -Appreciate Id Cx. Continue Daptomycin and ceftaroline.   -Blood cultures 11/27 with MRSA. Cultures since NGTD.   -GLENN with "Ragged fibrous deposits along lead in right atrium, likely right ventricular lead, with filimentous mobile echodensity measuring 7mm (image 6), cannot exclude endocarditis." Will consult EP once more stable.   -WBC count still elevated  -CT chest/abd/pelvis 11/30 with "large volume left-sided pleural effusion with associated near complete collapse of the left lung and rightward mediastinal shift".   -Appreciate PUL Cx. Holding off on thoracentesis until INR drops.     "

## 2018-12-03 NOTE — PLAN OF CARE
Problem: Patient Care Overview  Goal: Plan of Care Review  Outcome: Ongoing (interventions implemented as appropriate)  Pt AAOx4, follows commands. Pt on 2L nasal canula. No acute events overnight. Pt on levo gtts, titatrating MAP > 60. CVP 9-10. No BM during this shift. No LVAD alarms. VS and assessment via flowsheet.

## 2018-12-03 NOTE — PROGRESS NOTES
Patient AAO with wife at bedside. Interrogation of VAD without alarms.  All question answered with verbalization of understanding. Will continue to monitor patients status.

## 2018-12-04 ENCOUNTER — RESEARCH ENCOUNTER (OUTPATIENT)
Dept: RESEARCH | Facility: HOSPITAL | Age: 68
End: 2018-12-04

## 2018-12-04 LAB
BACTERIA BLD CULT: NORMAL
BACTERIA BLD CULT: NORMAL

## 2018-12-04 NOTE — PROGRESS NOTES
Patient  12/3/18. Patient was a study participant in [employer] CAP. Notified Mann on 12/3/18 of patient's death. Family does not wish to perform autopsy and collect HM 3 pump from patient. Unable to send pump back to [employer]. Will remove patient from the trial.

## 2018-12-04 NOTE — PROGRESS NOTES
Pt's wedding ring and cross chain necklace removed by and taken by wife. Wife placed pt's dentures in pt's mouth prior to [place] [facility]

## 2018-12-05 LAB
BACTERIA BLD CULT: NORMAL
BACTERIA BLD CULT: NORMAL

## 2019-01-01 NOTE — LETTER
February 19, 2018        Joe Ernst  [address] [place] [address]  Phone: [phone]  Fax: [phone]             [facility] [address] [place] [address]  Phone: [phone]   Patient: Suman Hayden   MR Number: [id number]   YOB: 1950   Date of Visit: 2/19/2018       Dear Dr. Joe Ernst    Thank you for referring Suman Hayden to me for evaluation. Attached you will find relevant portions of my assessment and plan of care.    If you have questions, please do not hesitate to call me. I look forward to following Suman Hayden along with you.    Sincerely,    Deejay Duff Jr, MD    Enclosure    If you would like to receive this communication electronically, please contact [email or web address] or [phone] to request [employer] Link access.    [employer] Link is a tool which provides read-only access to select patient information with whom you have a relationship. Its easy to use and provides real time access to review your patients record including encounter summaries, notes, results, and demographic information.    If you feel you have received this communication in error or would no longer like to receive these types of communications, please e-mail [email or web address]       
No

## 2019-02-06 NOTE — ASSESSMENT & PLAN NOTE
-pt presently initially dry with sepsis, s/p IVF hydration, labs notable for elevated BNP, CXR and CT show left pleural effusion, pt saturating well on 1 Liter oxygen via NC   -c/w home lisinopril, holding home aldactone b/c of elevated serum Cr   History of MI in 2004, s/p stents  - Noncompliant with medications  - Start on aspirin, statin, hold off on plavix for now pending stroke w/u  - F/u echo  - Cardio Dr Carter

## 2019-06-04 NOTE — NURSING
Came to bedside to reassess patient. She was initially sleeping. When awakened, patient complained of shortness of breath. Patient was slightly confused.   Vital signs repeated.     Vitals T-98.3, HR-65, BP-116/71. RR-18, SpO2-89% on 3L NC    Physical Exam:    Constitutional: Elderly female, lethargic, laying in bed  HEENT: PERRL, EOMI, sclera non-icteric, neck supple, trachea midline, no masses, no JVD, MMM, good dentition  Respiratory: Decreased breath sounds at B/L bases, no wheezing rales or rhonchi  Cardiovascular: RRR, normal S1S2, no M/R/G  Gastrointestinal: obese, soft, NTND, no masses palpable, BS normal  Extremities: Warm, well perfused, pulses equal bilateral upper and lower extremities, enlarged LE however not edematous, no clubbing. Capillary refill <2 sec  Neurological: AAOx1-2 to person and hospital (although not specific hospital) but not time, CN Grossly intact  Skin: Pale, Normal temperature, warm, dry    Labs: ABG drawn showing 7.37/62/90/35; lactate 0.7. CBC w/ leukocytosis unchanged from earlier and CMP w/ bicarb 33.     Assessment/Plan:   Consistent w/ chronic compensated respiratory acidosis. Placed patient on bipap at 12/5 FiO2 50%. ICU reconsulted for increased CO2 retention and concern for acute hypoxic hypercarbic respiratory failure.    - F/u ICU consult recommendations  - Will get repeat ABG after on bipap for 30 minutes  - Continue to monitor Received and acknowledged discharge instructions ordered at 0927 hours.  Ordered to hold discharged for MD fish and Pharm D medication reconciliation.  Provided a copy of AVS to patient.  Discharge instructions explained to patient.   Discontinued PIV in left antecubital and right forearm.  Tips intact.  Removed telemetry.  Informed patient of future follow-up appointments.  Administered all ordered medications.  Explained medication regime to include new, continued, and discontinued medications.  Patient informed when next dose is due for all medications. Patient cannot read, MIS not completed.  Discussed when to call the doctor. Discussed heart failure tips, diagnosing heart failure, treatment plan, diet, procedures, tracking weight, signs of a flare-up to include:  swelling, shortness of breath dizziness, chest pain and cough.   education was completed. Instructed patient to keep bandage on right wrist for 24 hours.  No bleeding, swelling or discoloration noted at right radial site.  Patient acknowledged understanding of all instructions.  Will continue to monitor patient until off unit. Transport requested.

## 2019-06-08 NOTE — PROCEDURES
Patient has an [address] with [address] [place] [address]. RN and MD aware. [facility] and left message about scheduling interrogation and placing in MR mode for MR asap. TXP LVAD INTERROGATIONS 8/16/2017 8/16/2017 8/16/2017 8/16/2017 8/16/2017 8/16/2017 8/16/2017   Type HeartMate3 HeartMate3 HeartMate3 HeartMate3 HeartMate3 HeartMate3 HeartMate3   Flow 4.3 4.3 4.1 4.1 4.1 4.1 4.1   Speed 5100 5100 5100 5100 5100 5100 5100   PI 2.7 2.7 3.3 3.3 3.3 3.3 3.4   Power ([place]) 3.4 3.4 3.5 3.4 3.4 3.4 3.4   LSL - - - 4700 - - -   Pulsatility - - - Pulse - - -   Some recent data might be hidden     Pt sleepy, wife AAAO.     Many PI events with some speed drops to 4800 overnight.  No alarms  Slightly pulsatile

## 2019-07-08 NOTE — ASSESSMENT & PLAN NOTE
--melena may be secondary to gastric polyps vs ileal ulcer  --will do EGD tomorrow 3/7 to assess for need to remove gastric polyps  --okay to have low residue diet today, make NPO at midnight for EGD tomorrow  --may need general surgery consult down the line  --continue IV PPI BID  --recommend to hold off on warfarin today (INR needs to be below 2.5 for any intervention), and place on heparin drip for now.    Case discussed with fellow and staff   Jose Wright

## 2019-07-18 NOTE — TELEPHONE ENCOUNTER
On-Call Note:     ARINA received page from Dr. Ibanez, ,ext. [phone], to inform SW that pt will be discharging today to his home. Pt will discharge with IV Dobutamine through [facility] and HH through [facility]. ARINA contacted [facility] ([phone]) and spoke with on call pharmacist who states she will have one of her nurses come to hospital to hook pt up before he leaves. ARINA contacted [facility] ([phone])and spoke with on call RN Todd who confirmed pt's information and orders. ARINA contacted pt's bedside nurse on CSU, ext. [phone], and explained pt is clear for discharge after RN from [facility] comes to hook pt up. RN verbalized her understanding. Pt's wife will provide transportation. No other issues noted. On call ARINA remains available at [phone].    Ambulatory

## 2019-12-30 NOTE — SUBJECTIVE & OBJECTIVE
Past Medical History:   Diagnosis Date    Cardiomyopathy     Hyperlipidemia     Hypertension     Obesity     S/P implantation of automatic cardioverter/defibrillator (AICD)     Ventricular tachycardia        Past Surgical History:   Procedure Laterality Date    CARDIAC CATHETERIZATION      CARDIAC DEFIBRILLATOR PLACEMENT         Review of patient's allergies indicates:  No Known Allergies    Medications:  Prescriptions Prior to Admission   Medication Sig    amiodarone (PACERONE) 200 MG Tab Take by mouth once daily.    aspirin 81 MG Chew Take 81 mg by mouth once daily.    DOBUTamine (DOBUTREX) 1,000 mg/250 mL (4,000 mcg/mL) infusion Inject 414 mcg/min into the vein continuous.    furosemide (LASIX) 40 MG tablet Take 1 tablet (40 mg total) by mouth 2 (two) times daily.    potassium chloride SA (K-DUR,KLOR-CON) 20 MEQ tablet Take 20 mEq by mouth once daily.     pravastatin (PRAVACHOL) 20 MG tablet Take 20 mg by mouth every evening.    spironolactone (ALDACTONE) 25 MG tablet Take 1 tablet (25 mg total) by mouth once daily.     Antibiotics     Start     Stop Route Frequency Ordered    08/09/17 0800  cefepime in dextrose 5 % 1 gram/50 mL IVPB 1 g      -- IV Every 12 hours (non-standard times) 08/09/17 0645    07/27/17 2100  mupirocin 2 % ointment      08/01 2059 Nasl 2 times daily 07/27/17 1346        Antifungals     None        Antivirals     None           Immunization History   Administered Date(s) Administered    Tdap 07/19/2017       Family History     None        Social History     Social History    Marital status:      Spouse name: N/A    Number of children: N/A    Years of education: N/A     Social History Main Topics    Smoking status: Never Smoker    Smokeless tobacco: Never Used    Alcohol use No    Drug use: Unknown    Sexual activity: Not Asked     Other Topics Concern    None     Social History Narrative    None     Review of Systems   Unable to perform ROS: Intubated      Objective:     Vital Signs (Most Recent):  Temp: 99 °F (37.2 °C) (08/12/17 1255)  Pulse: 79 (08/12/17 1400)  Resp: 15 (08/12/17 1400)  BP: (!) 70/0 (08/12/17 1200)  SpO2: 100 % (08/12/17 1400) Vital Signs (24h Range):  Temp:  [97.8 °F (36.6 °C)-99.1 °F (37.3 °C)] 99 °F (37.2 °C)  Pulse:  [] 79  Resp:  [11-20] 15  SpO2:  [97 %-100 %] 100 %  BP: (64-80)/(0) 70/0  Arterial Line BP: (55-83)/(49-68) 78/63     Weight: 85.5 kg (188 lb 7.9 oz)  Body mass index is 28.66 kg/m².    Estimated Creatinine Clearance: 36.3 mL/min (based on Cr of 2.1).    Physical Exam   Constitutional: He appears well-developed and well-nourished.   HENT:   Head: Normocephalic and atraumatic.   Eyes: Conjunctivae are normal. No scleral icterus.   Cardiovascular: Intact distal pulses.    No murmur heard.  Pulmonary/Chest: He has no wheezes. He has rales.   ET tube in place   Abdominal: He exhibits distension. There is no tenderness. There is no rebound and no guarding.   Musculoskeletal: He exhibits edema.   L upper ext edema > R   Lymphadenopathy:     He has no cervical adenopathy.   Neurological: He is alert.   Skin: Skin is warm.       Significant Labs:   Blood Culture:   Recent Labs  Lab 08/04/17  1653 08/04/17  1654 08/09/17  0733 08/09/17  0813 08/11/17  1326   LABBLOO No growth after 5 days. No growth after 5 days. No Growth to date  No Growth to date  No Growth to date  No Growth to date Gram stain clement bottle: Gram negative rods   Results called to and read back by: Emma York RN 08/10/2017  11:13  ESCHERICHIA COLI ESBL No Growth to date     BMP:   Recent Labs  Lab 08/12/17  0300 08/12/17  1119   * 161*    141   K 3.7 3.7    105   CO2 23 23   BUN 94* 99*   CREATININE 2.0* 2.1*   CALCIUM 8.3* 8.1*   MG 2.2  --      CBC:   Recent Labs  Lab 08/11/17  1718 08/11/17  2346 08/12/17  0300 08/12/17  1119   WBC 10.56 9.26 8.67  --    HGB 8.3* 7.9* 7.5* 7.4*   HCT 23.6* 22.5* 21.4* 21.3*    154 140* 127*      Respiratory Culture:   Recent Labs  Lab 08/09/17  0723   GSRESP <10 epithelial cells per low power field.  Few WBC's  Many Gram negative rods  Few Gram positive rods  Few Gram positive cocci   RESPIRATORYC ESCHERICHIA COLI ESBLMany     Urine Culture:   Recent Labs  Lab 07/24/17  1139 08/04/17  1655 08/09/17  0725   LABURIN ENTEROCOCCUS FAECALIS>100,000 cfu/mlNo other significant isolate No growth No growth     Urine Studies:   Recent Labs  Lab 08/10/17  1602   COLORU [place]   APPEARANCEUA Cloudy*   PHUR 5.0   SPECGRAV 1.015   PROTEINUA 1+*   GLUCUA Negative   KETONESU Negative   BILIRUBINUA Negative   OCCULTUA 2+*   NITRITE Negative   UROBILINOGEN Negative   LEUKOCYTESUR 2+*   RBCUA 20*   WBCUA 8*   BACTERIA None   SQUAMEPITHEL 1   HYALINECASTS 0       Significant Imaging: CXR: I have reviewed all pertinent results/findings within the past 24 hours:  no consolidation or pneumothorax Abd x-ray- No obstruction. Unchanged mild diffuse gaseous distention of small and large bowel. No new abnormality.   18

## 2020-01-07 NOTE — ASSESSMENT & PLAN NOTE
INDIRA on CKD III  -INDIRA appears to be multifactorial, Ischemic ATN from renal hypoperfusion vs. Septic ATN.  -Baseline SCr appears to be around 1.2-1.3.  He has had multiple INDIRA's in the past, likely 2/2 to poor renal perfusion from cardiomyopathy.  -had initial insult on the day of LVAD surgery on the 27th with a spike in his SCr, but it quickly resolved and returned to baseline.  His SCr stayed stable until the 1st when it increased to 1.5 and has continued to trend up until 2.3 today.   -Signs of decreased perfusion occurred on July 31st with reported drops in LVAD Flows which resolved with decrease in lasix dose and 1 unit of PRBC infusion.   -will repeat UA, UPCR, urine lytes.    -recommend discontinuing lasix gtt.  His CVP is 9 and no signs of overload on exam, and patient is receiving albumin for volume expansion.  He has been having large volumes of OG tube suction.   -currently no acute indication for RRT.  Will continue following patient.             1 person assist

## 2020-03-05 NOTE — ED NOTES
Bed: 02  Expected date:   Expected time:   Means of arrival:   Comments:  LVAD   PREOPERATIVE DIAGNOSIS:   1. Valgus malalignment left knee  2. Recurrent patellar instability left knee    POSTOPERATIVE DIAGNOSIS:  1. Valgus malalignment left knee  2. Recurrent patellar instability left knee  3. Trochlear dysplasia left knee    PROCEDURE:  1. Examination under anesthesia left knee  2. Left knee arthroscopy  3. Chondroplasty lateral trochlea left knee  4. Medial retinacular imbrication left knee  5. Lateral retinacular lengthening left knee  6. Distal femoral osteotomy    DATE OF SURGERY: 3/5/2020    SURGEON: Angel Madera MD    ASSISTANT: None.     RESIDENT OR FELLOW: Abhinav Ellington MD    OPERATIVE INDICATIONS: Naman Jones is a pleasant 36 year old male who I saw through my orthopedic clinic with a history, physical, imaging consistent with patellar instability and valgus malalignment left lower extremity.  He initially saw my partner who indicated the patient for distal femoral osteotomy medial reefing of his retinaculum versus medial patellofemoral ligament reconstruction and lateral retinacular lengthening.  Ultimately due to my familiarity with distal femoral osteotomies as well as availability of scheduling the patient saw me through clinic and I was scheduled to perform the procedure.  I met the patient both the clinic as well as the preoperative area all questions were answered..  I reviewed with the patient the risks, benefits, complications, techniques and alternatives to surgery.  We reviewed the expected course of recovery and the potential expected outcomes.  The patient understood both the risks and benefits and desired to proceed despite the risks.    OPERATIVE DETAILS: In the preoperative area the patient's informed consent was reviewed and they desired to proceed.  The left leg was marked and the patient was in agreement.  The patient was taken to the operating room where a timeout was performed and all parties were in agreement.  Preoperative antibiotics were given  within 1 hour of the time of incision.  The patient was placed in the supine position and surrendered to LMA anesthesia.  No tourniquet was applied.  Egg crate was placed beneath the well leg and a side post was utilized.  The operative leg was prepped and draped in the usual sterile fashion.     Examination Under Anesthesia:    2 quadrant lateral translation of the patella, 1 quadrant medial.  Good check rein.  Stable to varus valgus stress testing stable anterior posterior drawer testing.    Anterior medial and anterior lateral arthroscopic portals were created and diagnostic arthroscopy was performed the following findings: Medial femoral condyle, medial tibial plateau, medial meniscus was normal.  Lateral meniscus was intact.  Chondral wear grade 3-4 the lateral femoral condyle.  Trochlear dysplasia was present high-grade cartilage loss the distal lateral trochlea as well.  Tibial plateau was largely maintained.    At this time a chondroplasty was completed of the distal lateral trochlea as well as the lateral femoral condyle and lateral tibial plateau.  The arthroscope was then withdrawn.  We turned our attention to the lateral aspect we approach the lateral distal femur under x-ray control the IT band was opened and the vastus lateralis was retracted anteriorly.  2 drill tip pins were then placed in a proximal lateral to distal medial direction across the knee aiming at the medial epicondyle with a plan to cut above this.  Care was taken to ensure that they were perpendicular to the long axis of the femur.  After placement of these we confirmed on C-arm imaging that her retractors were well placed and our osteotomy was completed with an oscillating saw on the lateral cortex an osteotome was used to complete anterior and posteriorly.  At this time her  device was introduced and slow spreading was performed across the osteotomy site.  We then opened to approximate the desired amount and obtained AP  x-rays to check her coronal plane alignment.  I was satisfied of the weightbearing axis fell through the midportion of the knee.  And our Arthrex contoured lock plate was placed.  3 unicortical 6 5 screws were placed distally within the epicondyle fragment bicortical 4.5 millimeter screws were then placed in the shaft.  A slurry of the patient's own bone as well as demineralized bone matrix was then placed into the osteotomy site and finally our locking screw was placed in the epiphyseal segment fragment.  Final images showed good position of our osteotomy as well as the weightbearing line good position of the hardware.  At this time a layered closure was initiated 0 Vicryl 2-0 Vicryl and Monocryl.    We turned our attention to the retinacular imbrication of the medial side the patient's medial portal was enlarged carried onto the skin and subcutaneous tissues were identified layer between layers 2 and 3 and a FiberWire grasping stitch was placed in the medial retinaculum this would was advanced up onto the anterior aspect of the patella and knot-tying was completed was then oversewn with 0 FiberWire.  At this time her patella was examined and showed 1 quadrant lateral translation and tilt to neutral.  Finally a lateral retinacular lengthening was completed to her lateral incision with a lateral retinacular layers were identified were divided from the IT band layer and these were oversewn in a lengthened position to allow the patella to be well centered within the femoral groove.    Copious irrigation was performed an a layered closure was initiated, sterile dressings were applied and the patient was transferred to the recovery room in stable condition with stable vital signs.    ESTIMATED BLOOD LOSS: 200 mL.    TOURNIQUET TIME: No tourniquet was placed.    COMPLICATIONS: None apparent.    DRAINS: None.    SPECIMENS: None.     POSTOPERATIVE PLAN:  1. Patient be admitted to the hospital he can discharge when he meets  discharge criteria pain controlled on oral pain medicine and clears physical therapy  2. Toe-touch weightbearing left lower extremity x6 weeks  3. Range of motion 0 to 90 degrees when sitting or doing therapy  4. Hinged knee brace on and locked when up and around, off for unlocked for sitting  5. Aspirin 162 mg daily for DVT prophylaxis

## 2020-03-29 NOTE — PROGRESS NOTES
AICD reprogrammed from DDD to DOO and tachy therapy disabled for surgery.  Call device clinic at [phone] after surgery for device reprogramming.   Labs

## 2020-05-31 NOTE — PATIENT INSTRUCTIONS
Decrease hydralazine to 50mg three times a day. Sent a new prescription for hydralazine 50mg tablets.   
Attending Attestation (For Attendings USE Only)...

## 2020-07-29 NOTE — NURSING
Patient presented to the clinic today for a port flush.Right chest port flushed per protocol using sterile central line dressing kit and sterile technique. Offered the use of cold spray. Port was accessed with 20G/0.75\" power port needle and flushed with 10 ml normal saline followed by a Heparin 5ml of 100u/ml. Port flushes well and gives a positive blood return. No c/o pain or discomfort noted during infusion. Fluids and snack offered during infusion. No S/S of infection or swelling noted. Port needle removed and bandaid applied. Patient instructed to keep bandaid in place for a few hours to protect site. Patient tolerated procedure well. Patient is ambulatory without assistance and was escorted to the [facility] without incidence.  Treatment code [id number]     Pt OOBTC per PTx2 and RN assist. Pt tolerating well. HR slightly increased from 90s to low 100s. Will continue to monitor closely.

## 2020-12-07 NOTE — ANESTHESIA PREPROCEDURE EVALUATION
01/25/2018  [facility]  Anesthesia Pre-Operative Evaluation         Patient Name: Suman Hayden  YOB: 1950  MRN: [id number]    SUBJECTIVE:     Pre-operative evaluation for Procedure(s) (LRB):  Retrograde deivce assisted enteroscopy (N/A)     01/25/2018    Suman Hayden is a 67 y.o. male w/ a significant PMHx of NICM (EF 10%) s/p LVAD in July 2017, HTN, HLD, s/p ICD revision on 11/20/17c/b by pocket hematoma. Patient was recently admitted with bacteremia. Patient now admitted after having supratherapeutic INR and Hb of 4.1 and dark stools s/p 2u PRBCs (on coumadin at home). Patient had GI bleed in December and found to have ileal ulcer requiring balloon enteroscopy and blood transfusions. Pt is s/p anterograde enteroscopy and now presents for the above procedure.     Pt denies recent illness, fever, chills, N/V, cough, SOB, or CP.     INR-2.4      LDA:        PICC Double Lumen 01/10/18 1055 right brachial (Active)   Site Assessment Clean;Dry;Intact 1/25/2018 10:48 AM   Lumen 1 Status Normal saline locked 1/25/2018 10:48 AM   Lumen 2 Status Normal saline locked 1/25/2018 10:48 AM   Dressing Type Transparent 1/25/2018 10:48 AM   Dressing Status Biopatch in place;Clean;Dry;Intact 1/25/2018 10:48 AM   Daily Line Review Performed 1/25/2018 10:48 AM   Number of days: 15            Peripheral IV - Single Lumen 01/25/18 1001 Left Antecubital (Active)   Site Assessment Clean;Dry;Intact 1/25/2018 10:48 AM   Line Status Saline locked;Flushed 1/25/2018 10:48 AM   Dressing Status Clean;Dry;Intact 1/25/2018 10:48 AM   Reason Not Rotated Not due 1/25/2018 10:48 AM   Number of days: 0            VAD 07/27/17 1312 Left ventricular assist device HeartMate 3 (Active)   Site Location Abdomen right 1/25/2018 10:48 AM   Site Assessment Clean;Dry;Intact 1/25/2018 10:48 AM   Driveline Exit  Site 1 1/25/2018 10:01 AM   Dressing Status Clean;Dry;Intact 1/25/2018 10:48 AM   Dressing Intervention Dressing changed 1/24/2018 12:45 AM   Performed By RN 1/24/2018 10:23 AM   Dressing Change Schedule Daily 1/25/2018 10:48 AM   Dressing Change Due 01/25/18 1/24/2018 10:23 AM   Driveline [place] in use H-tag 1/25/2018 10:01 AM   Condition CDI 1/25/2018 10:01 AM   Date changed 01/24/18 1/24/2018 12:45 AM   Power Source Battery A;Battery B 1/25/2018 10:48 AM   Equipment inventory at  Admission 1/25/2018 10:01 AM   Pump type HeartMate3 1/25/2018 10:01 AM   Primary Controller [id number] 1/25/2018 10:01 AM   Extra Controller [id number] 1/25/2018 10:01 AM   Battery 1 [id number] 1/25/2018 10:01 AM   Battery 2 [id number] 1/25/2018 10:01 AM   Battery 3 [id number] 1/25/2018 10:01 AM   Battery 4 [id number] 1/25/2018 10:01 AM   Battery Clips 4 1/25/2018 10:01 AM   Number of days: 182       Prev airway:   Placement Date: 01/25/18; Placement Time: 0850; Method of Intubation: Direct laryngoscopy; Inserted by: Anesthesia MD; Airway Device: Endotracheal Tube; Mask Ventilation: Not Attempted; Intubated: Postinduction; Blade: Shahzad #3; Airway Device Size: 7.5; Style: Cuffed; Cuff Inflation: Minimal occlusive pressure; Inflation Amount: 7; Placement Verified By: Auscultation, ETT Condensation, Capnometry; Grade: Grade II; Complicating Factors: Long mandible; Intubation Findings: Positive EtCO2, Bilateral breath sounds, Atraumatic/Condition of teeth unchanged;  Depth of Insertion: 22; Securment: Lips; Complications: None; Breath Sounds: Equal Bilateral; Insertion Attempts: 1; Removal Date: 01/25/18;  Removal Time: 0955    Drips: None documented.       Patient Active Problem List   Diagnosis    Nonischemic cardiomyopathy    Encounter for monitoring amiodarone therapy    Essential hypertension    V-tach    Elevated PSA    Hepatitis B core antibody positive since 2012    Chronic systolic congestive heart failure    Heart transplant  candidate    Hyperbilirubinemia    Malfunction of implantable cardioverter-defibrillator (ICD) electrode    Syncope and collapse    Atrial tachycardia    Hyperglycemia    AICD (automatic cardioverter/defibrillator) present    LVAD (left ventricular assist device) present    Atrial fibrillation    Heart replaced by heart assist device    Bacteremia    Anticoagulation monitoring, INR range 2-3    Defibrillator discharge    ICD (implantable cardioverter-defibrillator) pocket hematoma    Subtherapeutic international normalized ratio (INR)    Constipation    Normocytic anemia    Anemia    Ileum ulcer    Staphylococcus epidermidis bacteremia    GI bleed    Melena       Review of patient's allergies indicates:  No Known Allergies    Current Inpatient Medications:   dronabinol  5 mg Oral TID AC    furosemide  40 mg Oral BID    lisinopril  5 mg Oral Daily    magnesium oxide  400 mg Oral BID    mupirocin  1 g Nasal BID    pantoprazole  80 mg Intravenous BID    polyethylene glycol  2,000 mL Oral Once    [START ON 1/26/2018] polyethylene glycol  2,000 mL Oral Once    pravastatin  20 mg Oral QHS    vancomycin 750 mg in normal saline 250 mL IVPB (ready to mix system)  750 mg Intravenous Q12H       No current facility-administered medications on file prior to encounter.      Current Outpatient Prescriptions on File Prior to Encounter   Medication Sig Dispense Refill    amLODIPine (NORVASC) 5 MG tablet Take 1 tablet (5 mg total) by mouth once daily. 30 tablet 11    dronabinol (MARINOL) 5 MG capsule Take 1 capsule (5 mg total) by mouth 3 (three) times daily before meals. 90 capsule 5    furosemide (LASIX) 40 MG tablet Take 1 tablet (40 mg total) by mouth 2 (two) times daily. 60 tablet 11    lisinopril (PRINIVIL,ZESTRIL) 5 MG tablet Take 1 tablet (5 mg total) by mouth once daily. 90 tablet 3    magnesium oxide (MAG-OX) 400 mg tablet Take 1 tablet (400 mg total) by mouth 2 (two) times daily. 60  tablet 11    pantoprazole (PROTONIX) 40 MG tablet Take 1 tablet (40 mg total) by mouth 2 (two) times daily before meals. 60 tablet 11    polyethylene glycol (GLYCOLAX) 17 gram/dose powder Take 17 g by mouth daily as needed. 1700 g 3    pravastatin (PRAVACHOL) 20 MG tablet Take 1 tablet (20 mg total) by mouth every evening. 30 tablet 11    VANCOMYCIN HCL IN DEXTROSE 5 % (VANCOMYCIN IN 5 % DEXTROSE) 1.5 gram/250 mL Soln Inject 250 mLs (1,500 mg total) into the vein once daily.  1    warfarin (COUMADIN) 5 MG tablet Take 1 tablet (5mg) by mouth daily Mondays through Saturdays. Take 1.5 tablets (7.5mg) by mouth on Sundays 32 tablet 5       Past Surgical History:   Procedure Laterality Date    ABDOMINAL SURGERY      CARDIAC CATHETERIZATION      CARDIAC DEFIBRILLATOR PLACEMENT      LEFT VENTRICULAR ASSIST DEVICE  07/27/2017       Social History     Social History    Marital status:      Spouse name: N/A    Number of children: N/A    Years of education: N/A     Occupational History    Not on file.     Social History Main Topics    Smoking status: Never Smoker    Smokeless tobacco: Never Used    Alcohol use No    Drug use: No    Sexual activity: Not on file     Other Topics Concern    Not on file     Social History Narrative    No narrative on file       OBJECTIVE:     Vital Signs Range (Last 24H):  Temp:  [36.4 °C (97.6 °F)-37.3 °C (99.2 °F)]   Pulse:  [63-86]   Resp:  [12-20]   BP: ()/(0-80)   SpO2:  [96 %-100 %]   Arterial Line BP: ()/(53-58)       CBC:   Recent Labs      01/24/18   1416  01/25/18   0543   WBC  8.39  7.12   RBC  2.77*  2.64*   HGB  7.8*  7.7*   HCT  23.5*  23.0*   PLT  114*  116*   MCV  85  87   MCH  28.2  29.2   MCHC  33.2  33.5       CMP:   Recent Labs      01/23/18   1344  01/24/18   0525  01/25/18   0543   NA  138  142  144   K  3.3*  3.1*  3.3*   CL  105  109  110   CO2  24  27  27   BUN  39*  34*  25*   CREATININE  0.9  0.8  0.7   GLU  119*  84  73   MG   --    1.9  1.8   CALCIUM  8.5*  8.2*  8.6*   ALBUMIN  2.8*  2.2*   --    PROT  5.5*  4.6*   --    ALKPHOS  55  49*   --    ALT  12  8*   --    AST  18  13   --    BILITOT  0.2  0.5   --        INR:  Recent Labs      01/23/18   1344  01/24/18   0525  01/25/18   0543   INR  3.7*  2.8*  2.4*   APTT  35.2*   --    --        Diagnostic Studies:     EKG:     LVAD interrogation 01/25/18: with no report     2D ECHO:  Results for orders placed or performed during the hospital encounter of 11/20/17   2D echo with color flow doppler   Result Value Ref Range    EF 13 (A) 55 - 65    Mitral Valve Regurgitation TRIVIAL TO MILD     Diastolic Dysfunction Yes (A)     Est. PA Systolic Pressure 41.42 (A)     Tricuspid Valve Regurgitation TRIVIAL          ASSESSMENT/PLAN:         Anesthesia Evaluation    I have reviewed the Patient Summary Reports.    I have reviewed the Nursing Notes.   I have reviewed the Medications.     Review of Systems  Anesthesia Hx:  No problems with previous Anesthesia  History of prior surgery of interest to airway management or planning: heart surgery. Previous anesthesia: General  Denies Personal Hx of Anesthesia complications.   Social:  Non-Smoker, No Alcohol Use    Hematology/Oncology:     Oncology Normal    -- Anemia:   EENT/Dental:EENT/Dental Normal   Cardiovascular:   Exercise tolerance: poor Pacemaker (AICD, last shock 11/28, hematoma over site) Hypertension CHF NYHA Classification IV S/p LVAD   Pulmonary:  Pulmonary Normal    Renal/:  Renal/ Normal     Hepatic/GI:  Hepatic/GI Normal PUD, GI bleed   Musculoskeletal:  Musculoskeletal Normal    Neurological:  Neurology Normal    Endocrine:  Endocrine Normal    Dermatological:  Skin Normal    Psych:  Psychiatric Normal           Physical Exam  General:  Well nourished    Airway/Jaw/Neck:  Airway Findings: Mouth Opening: Normal Tongue: Normal  General Airway Assessment: Adult  Mallampati: II  TM Distance: Normal, at least 6 cm  Jaw/Neck Findings:  Neck ROM:  Normal ROM      Dental:  Dental Findings: Edentulous, Upper Dentures, Lower Dentures   Chest/Lungs:  Chest/Lungs Findings: Normal Respiratory Rate     Heart/Vascular:  Heart Findings: (LVAD hum)            Anesthesia Plan  Type of Anesthesia, risks & benefits discussed:  Anesthesia Type:  general, MAC  Patient's Preference:   Intra-op Monitoring Plan: standard ASA monitors  Intra-op Monitoring Plan Comments:   Post Op Pain Control Plan: multimodal analgesia, IV/PO Opioids PRN and per primary service following discharge from PACU  Post Op Pain Control Plan Comments:   Induction:   IV  Beta Blocker:  Patient is not currently on a Beta-Blocker (No further documentation required).       Informed Consent: Patient understands risks and agrees with Anesthesia plan.  Questions answered. Anesthesia consent signed with patient.  ASA Score: 3     Day of Surgery Review of History & Physical: I have interviewed and examined the patient. I have reviewed the patient's H&P dated:    H&P update referred to the provider.         Ready For Surgery From Anesthesia Perspective.        Alert and oriented to person, place and time/Patient baseline mental status

## 2021-01-29 NOTE — PLAN OF CARE
Patient presents with:  Establish Care: New Patient  Follow - Up: Blood Pressure     HPI:   Betty Greene is a 76year old male who presents to the office as a new patient to establish care. Used to see [facility] (Dr. Ayah Polanco).   [facility] Problem: Ventricular Assist Device (Adult)  Goal: Signs and Symptoms of Listed Potential Problems Will be Absent, Minimized or Managed (Ventricular Assist Device)  Signs and symptoms of listed potential problems will be absent, minimized or managed by discharge/transition of care (reference Ventricular Assist Device (Adult) CPG).  Outcome: Ongoing (interventions implemented as appropriate)  No alarms noted during shift. Self test performed this a.m.; test passed. All parameters WNL on pump.  Dsg to DLES changed, purulent, tan drainage noted to exit site.        Take 1 tablet by mouth daily. , Disp: , Rfl:     •  aspirin 81 MG Oral Tab EC, Take 81 mg by mouth daily. , Disp: , Rfl:     •  Multiple Vitamin (MULTI-VITAMIN DAILY) Oral Tab, Take by mouth., Disp: , Rfl:     •  Omega-3 Fatty Acids (FISH OIL OR), Take by mo no murmurs, rubs, clicks or gallops  Abdomen - soft, nontender, nondistended, no masses or organomegaly  Neurological - alert, oriented, normal speech, no focal findings or movement disorder noted  Extremities - peripheral pulses normal, no pedal edema, no

## 2021-03-15 NOTE — SUBJECTIVE & OBJECTIVE
Interval History: Patient has no complaints this morning.  He reports he is feeling better and he is ready to go home.  Urology consulted for elevated PSA and recommending outpatient follow up     Continuous Infusions:   DOBUTamine 5 mcg/kg/min (07/06/17 1936)     Scheduled Meds:   aspirin  81 mg Oral Daily    furosemide  40 mg Intravenous BID    heparin (porcine)  5,000 Units Subcutaneous Q8H    pravastatin  20 mg Oral QHS    sodium chloride 0.9%  10 mL Intravenous Q6H    sodium chloride 0.9%  3 mL Intravenous Q8H    spironolactone  25 mg Oral Daily     PRN Meds:    Review of patient's allergies indicates:  No Known Allergies  Objective:     Vital Signs (Most Recent):  Temp: 98.4 °F (36.9 °C) (07/07/17 0430)  Pulse: 84 (07/07/17 0430)  Resp: 20 (07/07/17 0430)  BP: (!) 97/52 (07/07/17 0430)  SpO2: 95 % (07/07/17 0430) Vital Signs (24h Range):  Temp:  [97.5 °F (36.4 °C)-98.6 °F (37 °C)] 98.4 °F (36.9 °C)  Pulse:  [58-84] 84  Resp:  [18-20] 20  SpO2:  [95 %-99 %] 95 %  BP: ()/(52-65) 97/52     Weight: 83.8 kg (184 lb 11.9 oz)  Body mass index is 28.09 kg/m².      Intake/Output Summary (Last 24 hours) at 07/07/17 0711  Last data filed at 07/07/17 0600   Gross per 24 hour   Intake           1183.4 ml   Output             1475 ml   Net           -291.6 ml       Telemetry: No ectopy     Physical Exam   Constitutional: He is oriented to person, place, and time. He appears well-developed and well-nourished.   Neck: Normal range of motion. Neck supple. JVD present.   Just above the clavicle    Cardiovascular: Normal rate and regular rhythm.  Exam reveals no gallop and no friction rub.    No murmur heard.  Intermittent S3   Pulmonary/Chest: Effort normal and breath sounds normal. He has no wheezes. He has no rales.   Abdominal: Soft. Bowel sounds are normal. There is no tenderness.   Musculoskeletal: He exhibits no edema.   Neurological: He is alert and oriented to person, place, and time.   Skin: Skin is warm  Patient called back and said anti- depressants make her nauseous and vomit. Is there any alternative to anti-depressants for anxiety? And she would like to know if her lab results show her Vit D level as deficient?     Please advise  [phone] and dry.       Significant Labs:  CBC:  No results for input(s): WBC, RBC, HGB, HCT, PLT, MCV, MCH, MCHC in the last 72 hours.  BNP:    Recent Labs  Lab 07/06/17  0428   BNP 2,045*     CMP:    Recent Labs  Lab 07/07/17  0457   GLU 66*  66*   CALCIUM 9.0  9.0   ALBUMIN 3.0*   PROT 6.4   *  135*   K 3.7  3.7   CO2 29  29   CL 96  96   BUN 15  15   CREATININE 1.2  1.2   ALKPHOS 53*   ALT 13   AST 17   BILITOT 3.0*      Coagulation:     Recent Labs  Lab 07/07/17  0458   INR 1.1   APTT 28.3     LDH:    Recent Labs  Lab 07/06/17  0932        Microbiology:  Microbiology Results (last 7 days)     ** No results found for the last 168 hours. **          I have reviewed all pertinent labs within the past 24 hours.    Estimated Creatinine Clearance: 63 mL/min (based on Cr of 1.2).    Diagnostic Results: reports reviewed  CT head, abdomen and pelvis: 7/3/17  2D echo with CFD: 7/3/17  RHC: 7/3/17

## 2021-05-07 NOTE — SUBJECTIVE & OBJECTIVE
Past Medical History:   Diagnosis Date    Arthritis     Cardiomyopathy     CHF (congestive heart failure)     Coronary artery disease     Encounter for blood transfusion     Gastric polyp     Hyperlipidemia     Hypertension     Hypotension, iatrogenic     Obesity     S/P implantation of automatic cardioverter/defibrillator (AICD)     Ventricular tachycardia        Past Surgical History:   Procedure Laterality Date    ABDOMINAL SURGERY      CARDIAC CATHETERIZATION      CARDIAC DEFIBRILLATOR PLACEMENT      CARDIAC DEFIBRILLATOR PLACEMENT      COLONOSCOPY      COLONOSCOPY N/A 3/9/2018    Procedure: COLONOSCOPY;  Surgeon: Andres Chatterjee MD;  Location: [address]);  Service: Endoscopy;  Laterality: N/A;    ESOPHAGOGASTRODUODENOSCOPY      LEFT VENTRICULAR ASSIST DEVICE  07/27/2017       Review of patient's allergies indicates:  No Known Allergies  Family History     Problem Relation (Age of Onset)    Heart disease Mother, Father        Social History Main Topics    Smoking status: Never Smoker    Smokeless tobacco: Never Used    Alcohol use No    Drug use: No    Sexual activity: Not Currently     Review of Systems   Constitutional: Positive for activity change and fatigue. Negative for appetite change, chills and fever.   HENT: Negative for sore throat and trouble swallowing.    Respiratory: Positive for shortness of breath. Negative for cough.    Cardiovascular: Negative for chest pain and leg swelling.   Gastrointestinal: Negative for abdominal distention, abdominal pain, anal bleeding, blood in stool, constipation, diarrhea, nausea and vomiting.   Genitourinary: Negative for decreased urine volume and difficulty urinating.   Musculoskeletal: Negative for arthralgias and back pain.   Skin: Negative for color change and pallor.   Neurological: Positive for weakness and light-headedness. Negative for dizziness.   Psychiatric/Behavioral: Negative for agitation and confusion.  Pt states someone called her about this issue already.     Objective:     Vital Signs (Most Recent):  Temp: 98.2 °F (36.8 °C) (08/05/18 1221)  Pulse: 80 (08/05/18 1221)  Resp: 18 (08/05/18 1221)  BP: 97/66 (08/05/18 1221)  SpO2: 99 % (08/05/18 1221) Vital Signs (24h Range):  Temp:  [98 °F (36.7 °C)-98.5 °F (36.9 °C)] 98.2 °F (36.8 °C)  Pulse:  [79-82] 80  Resp:  [10-27] 18  SpO2:  [91 %-100 %] 99 %  BP: ()/(0-66) 97/66     Weight: 74.4 kg (164 lb) (08/05/18 0900)  Body mass index is 22.87 kg/m².    No intake or output data in the 24 hours ending 08/05/18 1233    Lines/Drains/Airways     Line                 VAD 07/27/17 1312 Left ventricular assist device HeartMate 3 373 days          Peripheral Intravenous Line                 Peripheral IV - Single Lumen 08/04/18 2200 Right Antecubital less than 1 day         Peripheral IV - Single Lumen 08/04/18 2215 Right Forearm less than 1 day                Physical Exam   Constitutional: He is oriented to person, place, and time. He appears well-developed and well-nourished. No distress.   HENT:   Mouth/Throat: Oropharynx is clear and moist.   Eyes: No scleral icterus.   Cardiovascular: Normal rate.    LVAD hum   Pulmonary/Chest: Effort normal and breath sounds normal.   Abdominal: Soft. Bowel sounds are normal. He exhibits no distension and no mass. There is no tenderness. There is no guarding.   Musculoskeletal: He exhibits no edema or deformity.   Lymphadenopathy:     He has no cervical adenopathy.   Neurological: He is alert and oriented to person, place, and time.   Skin: Skin is warm and dry.   Psychiatric: He has a normal mood and affect.   Vitals reviewed.      Significant Labs:  CBC:   Recent Labs  Lab 08/04/18  1850 08/05/18  0632   WBC 10.07 9.33   HGB 4.7* 6.5*   HCT 15.6* 20.1*    173     CMP:   Recent Labs  Lab 08/04/18  1850   *   CALCIUM 9.4   ALBUMIN 3.8   PROT 6.7   *   K 4.4   CO2 16*      BUN 62*   CREATININE 1.9*   ALKPHOS 48*   ALT 12   AST 14   BILITOT 0.5     Coagulation:    Recent Labs  Lab 08/04/18  1850 08/05/18  0632   INR 4.1* 3.4*   APTT 31.1  --

## 2021-05-13 NOTE — ASSESSMENT & PLAN NOTE
-Presented with fever, found to have leukocytosis, elevated lactate/CRP and mild INDIRA.  -sources potentially: drive line infection (purulent discharge- sampled already and sent to lab; GS showed rare WBCs, CT abdomen showed small volume of fluid inferior aspect of VAD) vs skin infection (scrotal area with boils) vs UTI (pt with frequency, concentrated/dark urine).  -Appreciate IdCx. c/w broad spectrum antibiotics. continue vancomycin   -repeat blood cultures 11/27 are positive. Repeat blood cultures 11/28.  -Plan for GLENN today.  -Derm consulted for management of scrotal skin issue. Continue Mucinprocin    92

## 2021-05-13 NOTE — SUBJECTIVE & OBJECTIVE
Interval History: NAEON. LVAD sx planned for today.    Continuous Infusions:   sodium chloride 0.9%      dextrose 5 % and 0.45 % NaCl with KCl 40 mEq      DOBUTamine 5 mcg/kg/min (07/27/17 1400)    epinephrine infusion (NON-TITRATING) 0.09 mcg/kg/min (07/27/17 1400)    insulin (HUMAN R) infusion (adults)      nicardipine      propofol      vasopressin (PITRESSIN) infusion 0.02 Units/min (07/27/17 1400)     Scheduled Meds:   aspirin  325 mg Oral Daily    aspirin  325 mg Per NG tube Daily    ceFEPime (MAXIPIME) IVPB  1 g Intravenous Q8H    docusate sodium  200 mg Oral QHS    [START ON 7/28/2017] ferrous gluconate  324 mg Oral Daily with breakfast    [START ON 7/28/2017] fluconazole (DIFLUCAN) IVPB  400 mg Intravenous Q24H    mupirocin   Nasal BID    pantoprazole  40 mg Oral Daily    pantoprazole  40 mg Intravenous Daily    pravastatin  20 mg Oral QHS    sodium chloride 0.9%  3 mL Intravenous Q8H    [START ON 7/28/2017] vancomycin (VANCOCIN) IVPB  750 mg Intravenous Q24H     PRN Meds:albumin human 5%, albuterol sulfate, albuterol sulfate, bisacodyl, dextrose 50%, dextrose 50%, magnesium hydroxide 400 mg/5 ml, magnesium sulfate IVPB, oxycodone, oxycodone, potassium chloride, potassium chloride    Review of patient's allergies indicates:  No Known Allergies  Objective:     Vital Signs (Most Recent):  Temp: 97.5 °F (36.4 °C) (07/27/17 1400)  Pulse: 60 (07/27/17 1400)  Resp: 14 (07/27/17 1400)  BP: 136/77 (07/27/17 0600)  SpO2: 100 % (07/27/17 1400) Vital Signs (24h Range):  Temp:  [97.5 °F (36.4 °C)-98.2 °F (36.8 °C)] 97.5 °F (36.4 °C)  Pulse:  [59-72] 60  Resp:  [11-32] 14  SpO2:  [94 %-100 %] 100 %  BP: (110-165)/(57-87) 136/77  Arterial Line BP: ()/(56-79) 77/56     Weight: 64.2 kg (141 lb 8.6 oz)  Body mass index is 21.52 kg/m².      Intake/Output Summary (Last 24 hours) at 07/27/17 1415  Last data filed at 07/27/17 1400   Gross per 24 hour   Intake           2853.1 ml   Output              1916 ml   Net            937.1 ml       Hemodynamic Parameters:  PAP: (40-41)/(17-19) 40/19  PAP (Mean):  [25 mmHg-26 mmHg] 25 mmHg  CO:  [4 L/min] 4 L/min  CI:  [2.3 L/min/m2] 2.3 L/min/m2    Telemetry: no events    Physical Exam   Not seen as the patient went for LVAD sx.    Significant Labs:  CBC:    Recent Labs  Lab 07/27/17  0640  07/27/17  1328   WBC 6.10  --   --    RBC 4.63  --   --    HGB 13.1*  --   --    HCT 38.6*  < > 24*   *  --   --    MCV 83  --   --    MCH 28.3  --   --    MCHC 33.9  --   --    < > = values in this interval not displayed.  BNP:  No results for input(s): BNP in the last 72 hours.    Invalid input(s): BNPTRIAGELBLO  CMP:    Recent Labs  Lab 07/27/17  0558 07/27/17  1324     103 183*   CALCIUM 8.7  8.7 8.0*   ALBUMIN 3.0*  3.0*  --    PROT 7.2  7.2  --    *  130* 132*   K 3.8  3.8 4.0   CO2 25  25 20*   CL 91*  91* 97   BUN 28*  28* 28*   CREATININE 1.4  1.4 1.4   ALKPHOS 59  59  --    ALT 11  11  --    AST 19  19  --    BILITOT 2.0*  2.0*  --       Coagulation:     Recent Labs  Lab 07/27/17  1324   INR 1.4*   APTT 31.0     LDH:  No results for input(s): LDH in the last 72 hours.  Microbiology:  Microbiology Results (last 7 days)     Procedure Component Value Units Date/Time    Blood culture [[id number]] Collected:  07/24/17 1100    Order Status:  Completed Specimen:  Blood from Peripheral, Antecubital, Left Updated:  07/27/17 1412     Blood Culture, Routine No Growth to date     Blood Culture, Routine No Growth to date     Blood Culture, Routine No Growth to date     Blood Culture, Routine No Growth to date    Blood culture [[id number]] Collected:  07/24/17 1300    Order Status:  Completed Specimen:  Blood from Peripheral, Hand, Right Updated:  07/26/17 1812     Blood Culture, Routine No Growth to date     Blood Culture, Routine No Growth to date     Blood Culture, Routine No Growth to date    Urine culture [[id number]] Collected:  07/24/17 1139    Order  Status:  Completed Specimen:  Urine from Urine, Clean Catch Updated:  07/26/17 0245     Urine Culture, Routine --     ENTEROCOCCUS SPECIES  >100,000 cfu/ml  Identification and susceptibility pending            I have reviewed all pertinent labs within the past 24 hours.    Estimated Creatinine Clearance: 46.5 mL/min (based on Cr of 1.4).    Diagnostic Results:  I have reviewed and interpreted all pertinent imaging results/findings within the past 24 hours.   Walking

## 2021-09-01 NOTE — TELEPHONE ENCOUNTER
Mrs. Hayden notified of lab results.  She reports that [facility] told her yesterday they were D/C.  Not confirmed by HH yet.   
yes

## 2021-09-13 NOTE — PROGRESS NOTES
"   01/26/18 1538   Vital Signs   Temp 98.4 °F (36.9 °C)   Temp src Oral   Pulse 78   Heart Rate Source Monitor   Resp 16   SpO2 95 %   O2 Device (Oxygen Therapy) room air   /68   MAP (mmHg) 86   BP Location Left arm   Patient Position Lying     Pt Doppler = 86.   Notified MELISSA Clement.  PA stated that "it's OK as long as MAP <90".  No new orders received at this time.  Will continue to monitor.    " Detail Level: Detailed

## 2021-10-05 NOTE — PLAN OF CARE
Problem: Patient Care Overview  Goal: Plan of Care Review  Outcome: Revised  Plan of care discussed with patient. Patient is free of fall/trauma/injury. Denies CP, SOB, or pain/discomfort. Pt received IV abx per MD order. Dressing change completed per wife.  H&H 7.3/23.3.  No signs or complaints of bleeding.  INR 1.6.  All questions addressed. Will continue to monitor       Oriented - self; Oriented - place; Oriented - time

## 2022-01-03 NOTE — H&P
[facility]  Heart Transplant  H&P    Patient Name: Suman Hayden  MRN: [id number]  Admission Date: 10/24/2018  Attending Physician: Mohit Ambrosio MD  Primary Care Provider: Joe Ernst MD  Principal Problem:LVAD (left ventricular assist device) present    Subjective:     History of Present Illness:  Mr. Hayden is a 68 yoAAM, admitted to [facility] for symptomatic anemia in the setting of acute blood los anemia due to GI bleed. PMHx of NICM, S/P Heartmate 3 as DT therapy 7/27/17, ICD revision 11/20/17 with Dr. Vidal (DC ICD placed with active RA/LV leads (RV lead capped during revision) that was complicated by pocket hematoma, GI bleed, VT on Amiodarone. Patient is a transfer from [place] ER for acute blood loss anemia. HPI gathered  from spouse at bedside, state patient developed generalized weakness, worsening fatigability and pale appearance past 1 week. He presented to ER with c/o R sided chest pain, which patient describes as sharp shooting in nature, intermittent, 6/10, started. Further work up in ED revealed HBG ~ 4, baseline Hbg 9. Denies any GI bleed, no dark tarry stool, no melena, hematochezia, no hematuria, hemoptysis. Patient is on oral Iron but reports brown stools. Currently denies any chest pain, SOB, N/V/D. No VAD alarms.     Enroute given 1u pRBC. Repeat Hbg 5.3, INR 2.3 -> 4.4.             Past Medical History:   Diagnosis Date    Arthritis     Cardiomyopathy     CHF (congestive heart failure)     Coronary artery disease     Encounter for blood transfusion     Gastric polyp     Hyperlipidemia     Hypertension     Hypotension, iatrogenic     Iron deficiency anemia due to chronic blood loss 10/15/2018    LVAD (left ventricular assist device) present     Obesity     S/P implantation of automatic cardioverter/defibrillator (AICD)     Ventricular tachycardia        Past Surgical History:   Procedure Laterality Date    ABDOMINAL SURGERY      APPENDECTOMY       CARDIAC CATHETERIZATION      CARDIAC DEFIBRILLATOR PLACEMENT      CARDIAC DEFIBRILLATOR PLACEMENT      CLOSURE-STERNAL WOUND N/A 7/28/2017    Performed by Sunny Downing MD at [facility] OR 2ND FLR    COLONOSCOPY      COLONOSCOPY N/A 3/9/2018    Procedure: COLONOSCOPY;  Surgeon: Andres Chtaterjee MD;  Location: [facility] (2ND FLR);  Service: Endoscopy;  Laterality: N/A;    COLONOSCOPY N/A 8/8/2018    Procedure: COLONOSCOPY;  Surgeon: Andres Chatterjee MD;  Location: [facility] (2ND FLR);  Service: Endoscopy;  Laterality: N/A;    COLONOSCOPY N/A 8/8/2018    Performed by Andres Chatterjee MD at [facility] ENDO (2ND FLR)    COLONOSCOPY N/A 3/9/2018    Performed by Andres Chatterjee MD at [facility] (2ND FLR)    DEVICE ASSISTED ENTEROSCOPY-ANTEROGRADE N/A 1/25/2018    Performed by Andres Chatterjee MD at [facility] (2ND FLR)    DEVICE ASSISTED ENTEROSCOPY-ANTEROGRADE N/A 12/14/2017    Performed by Andres Chatterjee MD at [facility] (2ND FLR)    EGD (ESOPHAGOGASTRODUODENOSCOPY) N/A 8/8/2018    Performed by Andres Chatterjee MD at [facility] (2ND FLR)    ESOPHAGOGASTRODUODENOSCOPY      ESOPHAGOGASTRODUODENOSCOPY N/A 8/8/2018    Procedure: EGD (ESOPHAGOGASTRODUODENOSCOPY);  Surgeon: Andres Chatterjee MD;  Location: [facility] (2ND FLR);  Service: Endoscopy;  Laterality: N/A;    ESOPHAGOGASTRODUODENOSCOPY (EGD) N/A 3/6/2018    Performed by Andres Chatterjee MD at [facility] ENDO (2ND FLR)    ESOPHAGOGASTRODUODENOSCOPY (EGD) N/A 12/8/2017    Performed by Gagan Barger MD at [facility] ENDO (2ND FLR)    ESOPHAGOGASTRODUODENOSCOPY (EGD) N/A 8/17/2017    Performed by Kishore Mills MD at [facility] (2ND FLR)    EXPLORATORY-LAPAROTOMY with small bowel resection  N/A 3/13/2018    Performed by Kenyon Tellez MD at [facility] OR 2ND FLR    HEART CATH-RIGHT Right 7/3/2017    Performed by Angie Torres MD at [facility] CATH LAB    INSERTION-LEFT VENTRICULAR ASSIST DEVICE N/A 7/27/2017    Performed by Sunny Downing MD at [facility] OR [address] [place] [address]     INSERTION-LEFT VENTRICULAR ASSIST DEVICE N/A 7/25/2017    Performed by Sunny Downing MD at [facility] OR 2ND FLR    LEFT VENTRICULAR ASSIST DEVICE  07/27/2017    PLACEMENT-NEOPERICARDIUM N/A 7/28/2017    Performed by Sunny Downing MD at [facility] OR 2ND FLR    RESECTION-BOWEL  3/13/2018    Performed by Kenyon Tellez MD at [facility] OR 2ND FLR    Retrograde deivce assisted enteroscopy N/A 1/26/2018    Performed by Jesse Davis MD at [facility] ENDO (2ND FLR)    REVISION-LEAD-ICD N/A 11/20/2017    Performed by Rubén Winchester MD at [facility] CATH LAB    TONSILLECTOMY      TRANSESOPHAGEAL ECHOCARDIOGRAM (GLENN) N/A 1/9/2018    Performed by [facility] Diagnostic Provider at [facility] CATH LAB       Review of patient's allergies indicates:   Allergen Reactions    Asa [aspirin] Other (See Comments)     Bleeding ulcer       Current Facility-Administered Medications   Medication    0.9%  NaCl infusion (for blood administration)    amiodarone tablet 400 mg    dronabinol capsule 5 mg    HYDROcodone-acetaminophen 5-325 mg per tablet 1 tablet    magnesium oxide tablet 400 mg    mirtazapine tablet 7.5 mg    pantoprazole EC tablet 40 mg    polyethylene glycol packet 17 g    pravastatin tablet 20 mg    sodium chloride 0.9% flush 3 mL     Family History     Problem Relation (Age of Onset)    Heart disease Mother, Father        Tobacco Use    Smoking status: Never Smoker    Smokeless tobacco: Never Used   Substance and Sexual Activity    Alcohol use: No    Drug use: No    Sexual activity: Not Currently     Review of Systems   Constitutional: Positive for fatigue. Negative for chills, diaphoresis and fever.   HENT: Negative for congestion.    Respiratory: Negative for apnea, cough, choking, chest tightness, shortness of breath, wheezing and stridor.    Cardiovascular: Positive for chest pain. Negative for palpitations and leg swelling.   Gastrointestinal: Negative for abdominal distention, abdominal pain, anal bleeding, blood in  stool, constipation, diarrhea, nausea, rectal pain and vomiting.   Genitourinary: Negative for difficulty urinating and dysuria.   Musculoskeletal: Negative for arthralgias.   Neurological: Positive for weakness.     Objective:     Vital Signs (Most Recent):  Temp: 98.2 °F (36.8 °C) (10/24/18 2300)  Pulse: 79 (10/24/18 2300)  Resp: 18 (10/24/18 2300)  BP: (!) 70/0 (10/24/18 2300)  SpO2: (!) 91 % (10/24/18 2300) Vital Signs (24h Range):  Temp:  [97.6 °F (36.4 °C)-98.4 °F (36.9 °C)] 98.2 °F (36.8 °C)  Pulse:  [] 79  Resp:  [18-20] 18  SpO2:  [91 %-100 %] 91 %  BP: (66-77)/(0-61) 70/0     No data found.  Body mass index is 24.28 kg/m².    No intake or output data in the 24 hours ending 10/25/18 0153    Physical Exam   Constitutional: He appears well-developed and well-nourished. No distress.   HENT:   Head: Normocephalic and atraumatic.   Neck: Normal range of motion. No JVD present.   Cardiovascular: Normal rate, regular rhythm and intact distal pulses. Exam reveals no gallop and no friction rub.   Murmur heard.  Smooth LVAD humm   Pulmonary/Chest: Effort normal and breath sounds normal. No stridor. No respiratory distress. He has no wheezes. He has no rales. He exhibits no tenderness.   Abdominal: Soft. Bowel sounds are normal. He exhibits no distension. There is no tenderness.   Musculoskeletal: Normal range of motion. He exhibits no edema.   Skin: Skin is warm and dry. Capillary refill takes less than 2 seconds. He is not diaphoretic.   Psychiatric: He has a normal mood and affect.   Nursing note and vitals reviewed.      Significant Labs:  CBC:  Recent Labs   Lab 10/24/18  1519 10/25/18  0040   WBC 14.46* 14.72*   RBC 1.69* 2.01*   HGB 4.2* 5.3*   HCT 14.2* 17.9*    148*   MCV 84 89   MCH 24.9* 26.4*   MCHC 29.6* 29.6*     BNP:  Recent Labs   Lab 10/24/18  1519   *     CMP:  Recent Labs   Lab 10/24/18  1519 10/25/18  0040   * 109   CALCIUM 8.6* 8.5*   ALBUMIN 3.1* 2.9*   PROT 5.7* 5.4*     133*   K 4.2 4.6   CO2 13* 17*    108   BUN 47* 61*   CREATININE 1.5* 1.9*   ALKPHOS 39* 43*   ALT 14 12   AST 26 15   BILITOT 0.4 0.7      Coagulation:   Recent Labs   Lab 10/22/18  1157 10/25/18  0040   INR 2.3* 4.4*     LDH:  Recent Labs   Lab 10/25/18  0040   *     Microbiology:  Microbiology Results (last 7 days)     ** No results found for the last 168 hours. **          ABGs: No results for input(s): PH, PCO2, HCO3, POCSATURATED, BE in the last 72 hours.  BMP:   Recent Labs   Lab 10/25/18  0040      *   K 4.6      CO2 17*   BUN 61*   CREATININE 1.9*   CALCIUM 8.5*     Cardiac Markers: No results for input(s): CKMB, TROPONINT, MYOGLOBIN in the last 72 hours.  Coagulation:   Recent Labs   Lab 10/25/18  0040   INR 4.4*     I have reviewed all pertinent labs within the past 24 hours.    Diagnostic Results:  I have reviewed all pertinent imaging results/findings within the past 24 hours.    Assessment/Plan:     * LVAD (left ventricular assist device) present    [facility] s/p 3 2017:    - No LFA recently on interrogation   - Hold ASA / Coumadin with acute anemia (INR 4.4)  - LVAD order set  - LDH 200s / D-dimer 0.59 /  - Follow up limited TTE for VAD issues (concerning for hemolysis)   - VAD coordinator aware     Procedure: Device Interrogation Including analysis of device parameters  Current Settings: Ventricular Assist Device  Review of device function is stable/unstable stable    TXP LVAD INTERROGATIONS 10/24/2018 10/17/2018 10/17/2018 10/17/2018 10/16/2018 10/16/2018 10/16/2018   Type HeartMate3 HeartMate3 HeartMate3 HeartMate3 HeartMate3 HeartMate3 HeartMate3   Flow 4.1 3.8 3.9 3.7 4.3 4.4 -   Speed 5200 5200 5200 5200 5200 5200 5200   PI 2.3 3.4 4.8 5.5 2.9 2.4 -   Power ([place]) 3.5 3.6 3.5 3.5 3.6 3.5 -   LSL 4800  4800 4800 4800 4800 4800   Pulsatility Intermittent pulse - - Intermittent pulse Intermittent pulse Intermittent pulse Intermittent pulse        Anemia     Symptomatic anemia, Acute blood loss anemia, unclear etiology, presumed GI bleed (prior hx, however no recent melena), doubt hemolysis (LD mildly elevated, Bili wnl):   - Hbg 4.2 (BL ~9) s/p 1u pRBC repeat Hbg 5.3   - INR 2.3 -> 4.4   - Admit to [facility], Dr. Ambrosio   - GI consultation ordered, discuss in AM for EGD / C-scope  - NPO for now  - FOBT pending  - Ordered 2u pRBC (total 3u pRBC)   - Will consider 1u FFP (INR 4.4)  - Follow up CBC, CMP, Mag, INR in AM (use pediatric tubes only)  - Repeat LDH  - 2x large bore access  - Given Lasix 40mg after 2nd unit PRBC  - Hold PO Iron        VT (ventricular tachycardia)    Resume PTA Amiodarone  Caution previously developed VT with anemia     Essential hypertension    Holding PTA anti-HTNs, resume when feasible   Caution with GI bleed      Nonischemic cardiomyopathy    S/p LVAD    - LVEDD 6.2 / LVef 25%  - see VAD plan        INDIRA (acute kidney injury)    INDIRA (Cr 1.9, baseline 1.1):   Likely in the setting of hypoperfusion   - Hold nephrotoxic medications  - Follow up BMP in AM   - Will give Lasix 40mg once for 3u pRBC       Leukocytosis likely d/t leukemoid reaction with profound anemia:   - ABX held   - No signs or symptoms of infection   - Follow up CBC      Serge Boykin MD  Heart Transplant  [facility]   Cheiloplasty (Less Than 50%) Text: A decision was made to reconstruct the defect with a  cheiloplasty.  The defect was undermined extensively.  Additional obicularis oris muscle was excised with a 15 blade scalpel.  The defect was converted into a full thickness wedge, of less than 50% of the vertical height of the lip, to facilite a better cosmetic result.  Small vessels were then tied off with 5-0 monocyrl. The obicularis oris, superficial fascia, adipose and dermis were then reapproximated.  After the deeper layers were approximated the epidermis was reapproximated with particular care given to realign the vermilion border.

## 2022-01-08 NOTE — PROGRESS NOTES
1/23 -ER due to dark stool, Hospital discharge 1/31, taking: Marinol -not new, had EGD & Colonoscopy while in the hospital, had 7 pints of blood total given while in the hospital, has had dark stool, still on 3 boosts daily, also takes Nepro, taking Vancomycin -750mg twice a day, decreased Lasix  to 20mg twice a day and potassium to 10mg daily potassium, wife verified coumadin: 5mg daily since hosp discharge--states nurse may draw an INR today again with Vancomycin level   OBHx:    20w IUFD, PPROM, vaginal delivery  etop x1, no D&C  SABx1, no D&C    Gyn Hx:  Denies h/o ovarian cysts, fibroids, STIs, or abnormal pap smears.

## 2022-03-06 NOTE — PROCEDURES
"Suman Hayden is a 67 y.o. male patient.    Temp: 98.4 °F (36.9 °C) (07/06/17 1200)  Pulse: 60 (07/06/17 1500)  Resp: 18 (07/06/17 1200)  BP: (!) 88/55 (07/06/17 1200)  SpO2: 96 % (07/06/17 1200)  Weight: 84.1 kg (185 lb 6.5 oz) (07/06/17 0600)  Height: 5' 8" (172.7 cm) (07/03/17 1100)    PICC  Date/Time: 7/6/2017 4:38 PM  Performed by: CHEYANNE SOARES  Consent Done: Yes  Indications: med administration and vascular access  Anesthesia: local infiltration  Local anesthetic: lidocaine 1% without epinephrine  Anesthetic Total (mL): 3  Preparation: skin prepped with ChloraPrep  Skin prep agent dried: skin prep agent completely dried prior to procedure  Sterile barriers: all five maximum sterile barriers used - cap, mask, sterile gown, sterile gloves, and large sterile sheet  Hand hygiene: hand hygiene performed prior to central venous catheter insertion  Location details: right brachial  Catheter type: double lumen  Catheter size: 5 Fr  Catheter Length: 39cm    Ultrasound guidance: yes  Vessel Caliber: medium, compressibility normal  Vascular Doppler: not done  Needle advanced into vessel with real time Ultrasound guidance.  Guidewire confirmed in vessel.  Image recorded and saved.  Sterile sheath used.  no esophageal manometryNumber of attempts: 1  Post-procedure: blood return through all ports, chlorhexidine patch and sterile dressing applied  Specimens: No  Implants: No  Assessment: placement verified by x-ray        Patricia Holder  7/6/2017  "
Speaking Coherently

## 2022-05-03 NOTE — ASSESSMENT & PLAN NOTE
- INDIRA (Cr 1.9 on admit, down to 0.8 today)   - Likely in the setting of hypoperfusion   - Back to baseline and lisinopril restarted at low dose     What Is The Reason For Today's Visit?: Full Body Skin Examination What Is The Reason For Today's Visit? (Being Monitored For X): concerning skin lesions on an annual basis How Severe Are Your Spot(S)?: mild

## 2022-05-12 NOTE — PROGRESS NOTES
Dr. Downing updated on UOP 70ccc, 125cc for 2000 and 2100. Minimal sanguineous CT output. Current drips, CVP 14, MAP ~75, VAD numbers unchanged. Updated on ABG results as well as chemistry and CBC results. Will start heparin at 200 units/h (non-titrating) at 0400 AM.   [employer] Pregnancy And Lactation Text: This medication is Pregnancy Category X and is absolutely contraindicated during pregnancy. It is unknown if it is excreted in breast milk.

## 2022-07-06 NOTE — CONSULTS
Infectious Disease progress Note        Reason: C. difficile infection    Current abx Prior abx   Vancomycin since 7/5/2022      Lines:       Assessment :   61 y.o. female w/ PMH polysubstance use, including alcohol use disorder, chronic pancreatitis who presented to the ED on 7/2/2022 with complaints of abdominal pain and decreased tolerance for p.o. Hospitalization at [facility] 5/29-6/16/22 for septic shock due to E. coli bloodstream infection, acute on chronic pancreatitis    Clinical presentation c/w acute on chronic alcoholic pancreatitis, c.diff infection    Diarrhea - likely due to c.diff infection superimposed on chronic pancreatitis. Recent abx puts patient at risk for c.diff infection. GI follow-up appreciated. Plans for colonoscopy after resolution of C. difficile infection    Elevated LFTs-likely secondary to alcoholic hepatitis    Clinically better. Gradually improving diarrhea. Improved mentation    Recommendations:    1. Continue po vancomycin till 7/15/2022  2. F/u GI recommendations regarding pancreatitis  3. Continue probiotics  4. Monitor cbc, temp, clinically     Above plan was discussed in details with RN. Please call me if any further questions or concerns. Will continue to participate in the care of this patient. HPI:    Denies abdominal pain. States that she used alcohol prior to admission. Dozes off during conversation.   Detailed review of system not feasible         Past Medical History:   Diagnosis Date    Adrenal insufficiency ([address]     Alcohol intoxication ([address]     Analgesia     Anemia     Arthritis     Asthma     hx bronchitis    Bronchitis     Chronic obstructive pulmonary disease (HCC)     COPD with chronic bronchitis (HCC)     Cough     Dyslipidemia     GERD (gastroesophageal reflux disease)     History of bilateral knee arthroplasty 6/11/2019    Hypertension     Hypokalemia     Left knee pain     Nervousness     Osteoarthritis of left knee     [facility]  Infectious Disease  Consult Note    Patient Name: Suman Hayden  MRN: [id number]  Admission Date: 7/18/2017  Hospital Length of Stay: 8 days  Attending Physician: Mohit Ambrosio MD  Primary Care Provider: Joe Ernst MD     Isolation Status: No active isolations    Patient information was obtained from patient and past medical records.      Inpatient consult to Infectious Diseases  Consult performed by: AUNDREA LANDIN  Consult ordered by: ALEXANDRA DUEÑAS        Assessment/Plan:     Urine culture positive    67 year old scheduled for LVAD placement tomorrow; patient now has positive urine culture; ID consulted for LVAD clearance:  - urine culture positive for enterococcus species  - patient is completely asymptomatic- denies dysuria, hematuria, urinary frequency; he is afebrile and without leukocytosis; no delgado catheter in place  - would not recommend treatment for asymptomatic bacteruria at this time  - patient is cleared for LVAD placement from ID standpoint  - would initiate treatment if patient becomes symptomatic  - discussed with CTS  - seen with ID staff  - will sign off          Thank you for your consult. I will sign off.   MELISSA Baron  Infectious Disease  [facility]    Subjective:     Principal Problem: Acute on chronic combined systolic and diastolic heart failure    HPI: This is a 67 year old male with NICM on home dobutamine, HTN, HLD, s/p medtronic who presented after ICD shocks x 2. He is now being worked up for advanced options. He currently has IABP. He is being evaluated for LVAD.  We saw patient last weekend for LVAD clearance and he was cleared at that time with vaccines updated.  We are now re-consulted for positive urine culture with enterococcus. His UA has greater than 100 WBCs. Patient denies dysuria, urinary frequency, abd pain, or flank pain. He does not have a delgado catheter in place. He is afebrile and without leukocytosis.  He is scheduled for LVAD placement tomorrow and we are re-consulted for clearance.    Past Medical History:   Diagnosis Date    Cardiomyopathy     Hyperlipidemia     Hypertension     Obesity     S/P implantation of automatic cardioverter/defibrillator (AICD)     Ventricular tachycardia        Past Surgical History:   Procedure Laterality Date    CARDIAC CATHETERIZATION      CARDIAC DEFIBRILLATOR PLACEMENT         Review of patient's allergies indicates:  No Known Allergies    Medications:  Prescriptions Prior to Admission   Medication Sig    amiodarone (PACERONE) 200 MG Tab Take by mouth once daily.    aspirin 81 MG Chew Take 81 mg by mouth once daily.    DOBUTamine (DOBUTREX) 1,000 mg/250 mL (4,000 mcg/mL) infusion Inject 414 mcg/min into the vein continuous.    furosemide (LASIX) 40 MG tablet Take 1 tablet (40 mg total) by mouth 2 (two) times daily.    potassium chloride SA (K-DUR,KLOR-CON) 20 MEQ tablet Take 20 mEq by mouth once daily.     pravastatin (PRAVACHOL) 20 MG tablet Take 20 mg by mouth every evening.    spironolactone (ALDACTONE) 25 MG tablet Take 1 tablet (25 mg total) by mouth once daily.     Antibiotics     Start     Stop Route Frequency Ordered    07/27/17 0000  cefepime in dextrose 5 % 1 gram/50 mL IVPB 1 g      -- IV On Call Procedure 07/26/17 1548    07/27/17 0000  vancomycin (VANCOCIN) 750 mg in dextrose 5 % 250 mL IVPB  (Vancomycin IVPB with levels panel)      -- IV On Call Procedure 07/26/17 1548    07/24/17 1835  mupirocin 2 % ointment 1 g      -- Nasl On Call Procedure 07/24/17 1835        Antifungals     Start     Stop Route Frequency Ordered    07/27/17 0000  fluconazole (DIFLUCAN) IVPB 400 mg 200 mL      -- IV On Call Procedure 07/26/17 1548        Antivirals     None           Immunization History   Administered Date(s) Administered    Tdap 07/19/2017       Family History     None        Social History     Social History    Marital status:      Spouse name: N/A  Osteoarthritis of right knee     Right knee pain     S/P hip replacement, left, 11-     Shoulder dislocation     s/p spontaneous reduction, right    Sinus bradycardia     pt said resolved    Sleep apnea     does not use cpap anymore    Wears glasses     Weight loss        Past Surgical History:   Procedure Laterality Date    HX COLONOSCOPY  2008    HX GASTRIC BYPASS  2009     maximum weight 300 pounds before surgery    HX HIP REPLACEMENT Right 11-    right    HX HIP REPLACEMENT Right 02-04-16    revision    HX HYSTERECTOMY  1991    partial    HX HYSTERECTOMY      HX KNEE REPLACEMENT Bilateral     HX OTHER SURGICAL      shoulder repair, right    HX OTHER SURGICAL      left arm laceration    HX OTHER SURGICAL  08/01/15    Closed reduction right hip    OK TOTAL KNEE ARTHROPLASTY  9-    leftn knee, right knee and right hip       home Medication List    Details   albuterol (ProAir HFA) 90 mcg/actuation inhaler inhale 1 puff by mouth every 4 hours if needed for wheezing  Qty: 8.5 g, Refills: 1    Associated Diagnoses: Panlobular emphysema ([address] Chronic bronchitis, unspecified chronic bronchitis type (HCC)      hydroCHLOROthiazide (HYDRODIURIL) 12.5 mg tablet take 1 tablet by mouth daily if needed for LEG SWELLING  Qty: 30 Tablet, Refills: 1    Associated Diagnoses: Lower extremity edema      potassium chloride (KLOR-CON M10) 10 mEq tablet Take 1 Tablet by mouth daily. Qty: 90 Tablet, Refills: 0    Associated Diagnoses: Essential hypertension with goal blood pressure less than 707/90      folic acid (FOLVITE) 1 mg tablet Take 1 Tablet by mouth daily. Qty: 90 Tablet, Refills: 0      therapeutic multivitamin (THERAGRAN) tablet Take 1 Tablet by mouth daily. Qty: 90 Tablet, Refills: 0      methocarbamoL (ROBAXIN) 500 mg tablet Take 1 Tablet by mouth three (3) times daily.   Qty: 30 Tablet, Refills: 1    Associated Diagnoses: Cramp in lower leg      azelastine (ASTELIN) 137 mcg (0.1 %)    Number of children: N/A    Years of education: N/A     Social History Main Topics    Smoking status: Never Smoker    Smokeless tobacco: Never Used    Alcohol use No    Drug use: Unknown    Sexual activity: Not Asked     Other Topics Concern    None     Social History Narrative    None     Review of Systems   Constitutional: Negative for chills and fever.   Respiratory: Negative for cough and shortness of breath.    Cardiovascular: Negative for chest pain and leg swelling.   Gastrointestinal: Negative for abdominal pain, constipation, diarrhea, nausea and vomiting.   Genitourinary: Negative for dysuria, frequency and hematuria.   Musculoskeletal: Negative for back pain and myalgias.   Skin: Negative for rash and wound.   Neurological: Negative for dizziness, light-headedness and headaches.   Psychiatric/Behavioral: Negative for agitation and behavioral problems. The patient is not nervous/anxious.      Objective:     Vital Signs (Most Recent):  Temp: 98.2 °F (36.8 °C) (07/26/17 1101)  Pulse: 60 (07/26/17 1600)  Resp: 12 (07/26/17 1600)  BP: (!) 141/83 (07/26/17 1600)  SpO2: 97 % (07/26/17 1600) Vital Signs (24h Range):  Temp:  [98.1 °F (36.7 °C)-98.2 °F (36.8 °C)] 98.2 °F (36.8 °C)  Pulse:  [59-60] 60  Resp:  [8-26] 12  SpO2:  [97 %-100 %] 97 %  BP: (111-146)/() 141/83     Weight: 64.1 kg (141 lb 5 oz)  Body mass index is 21.49 kg/m².    Estimated Creatinine Clearance: 46.4 mL/min (based on Cr of 1.4).    Physical Exam   Constitutional: He is oriented to person, place, and time. He appears well-developed and well-nourished. No distress.   Cardiovascular: Normal rate and regular rhythm.    No murmur heard.  IABP sounds   Pulmonary/Chest: Effort normal and breath sounds normal. No respiratory distress.   Abdominal: Soft. Bowel sounds are normal. He exhibits no distension.   No suprapubic tenderness   Musculoskeletal: Normal range of motion. He exhibits no edema.   Neurological: He is alert and  nasal spray 1 [place] by Both Nostrils route two (2) times a day. Use in each nostril as directed  Qty: 1 Each, Refills: 1    Associated Diagnoses: Rhinorrhea      loratadine (CLARITIN) 10 mg tablet Take 1 Tablet by mouth daily as needed for Allergies. Qty: 30 Tablet, Refills: 2    Associated Diagnoses: Rhinorrhea      ondansetron (ZOFRAN ODT) 8 mg disintegrating tablet Take 1 Tablet by mouth every eight (8) hours as needed for Nausea. Qty: 10 Tablet, Refills: 0      fluticasone propion-salmeteroL (ADVAIR/WIXELA) 250-50 mcg/dose diskus inhaler Take 1 Puff by inhalation every twelve (12) hours. Qty: 1 Inhaler, Refills: 3    Associated Diagnoses: Panlobular emphysema (HCC)      multivitamin with iron (FLINTSTONES) chewable tablet Take 1 Tablet by mouth daily. cyanocobalamin/cobamamide (B12 SL) by SubLINGual route. calcium carbonate (CALCIUM 500 PO) Take  by mouth.      vitamin E acetate (VITAMIN E PO) Take  by mouth. ascorbic acid (VITAMIN C PO) Take  by mouth. thiamine hcl 250 mg tablet Take 250 mg by mouth daily. Qty: 30 Tab, Refills: 0    Associated Diagnoses: History of alcohol abuse      ferrous sulfate 325 mg (65 mg iron) tablet Take 1 Tab by mouth three (3) times daily.  Indications: anemia from inadequate iron  Qty: 60 Tab, Refills: 0    Associated Diagnoses: Chronic anemia          Current Facility-Administered Medications   Medication Dose Route Frequency    vancomycin 50 mg/mL oral solution (compounded) 125 mg  125 mg Oral Q6H    L. acidophilus,casei,rhamnosus (BIO-K PLUS) capsule 1 Capsule  1 Capsule Oral DAILY    thiamine (B-1) 500 mg in 0.9% sodium chloride 50 mL IVPB  500 mg IntraVENous BID    sodium chloride (NS) flush 5-40 mL  5-40 mL IntraVENous Q8H    sodium chloride (NS) flush 5-40 mL  5-40 mL IntraVENous PRN    LORazepam (ATIVAN) tablet 1 mg  1 mg Oral Q1H PRN    Or    LORazepam (ATIVAN) 2 mg/mL injection 1 mg  1 mg IntraVENous Q1H PRN    LORazepam (ATIVAN) tablet 2 mg 2 mg Oral Q1H PRN    Or    LORazepam (ATIVAN) 2 mg/mL injection 2 mg  2 mg IntraVENous Q1H PRN    LORazepam (ATIVAN) 2 mg/mL injection 3 mg  3 mg IntraVENous Q15MIN PRN    HYDROmorphone (DILAUDID) syringe 0.5 mg  0.5 mg IntraVENous Q3H PRN    naloxone (NARCAN) injection 0.4 mg  0.4 mg IntraVENous PRN    sodium chloride (NS) flush 5-40 mL  5-40 mL IntraVENous Q8H    sodium chloride (NS) flush 5-40 mL  5-40 mL IntraVENous PRN    acetaminophen (TYLENOL) tablet 650 mg  650 mg Oral Q6H PRN    Or    acetaminophen (TYLENOL) suppository 650 mg  650 mg Rectal Q6H PRN    polyethylene glycol (MIRALAX) packet 17 g  17 g Oral DAILY PRN    ondansetron (ZOFRAN ODT) tablet 4 mg  4 mg Oral Q8H PRN    Or    ondansetron (ZOFRAN) injection 4 mg  4 mg IntraVENous Q6H PRN    famotidine (PF) (PEPCID) 20 mg in 0.9% sodium chloride 10 mL injection  20 mg IntraVENous BID    ipratropium (ATROVENT) 0.02 % nebulizer solution 0.5 mg  0.5 mg Nebulization Q8H RT    arformoteroL (BROVANA) neb solution 15 mcg  15 mcg Nebulization BID RT    0.9% sodium chloride infusion  100 mL/hr IntraVENous CONTINUOUS    folic acid (FOLVITE) tablet 1 mg  1 mg Oral DAILY    therapeutic multivitamin (THERAGRAN) tablet 1 Tablet  1 Tablet Oral DAILY       Allergies: Lisinopril    Family History   Problem Relation Age of Onset    Hypertension Father     Stroke Father     Other Mother         hepatitis c    Hypertension Brother     Hypertension Sister     Diabetes Other     OSTEOARTHRITIS Other      Social History     Socioeconomic History    Marital status: SINGLE     Spouse name: Not on file    Number of children: Not on file    Years of education: Not on file    Highest education level: Not on file   Occupational History    Occupation: disabled-arthritis     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   Skin: Skin is warm and dry.   Psychiatric: He has a normal mood and affect. His behavior is normal.       Significant Labs:   Blood Culture:   Recent Labs  Lab 07/24/17  1100 07/24/17  1300   LABBLOO No Growth to date  No Growth to date  No Growth to date No Growth to date  No Growth to date     CBC:   Recent Labs  Lab 07/25/17  0311 07/26/17  0354   WBC 6.69 6.76   HGB 14.6 14.1   HCT 41.5 41.8    151     CMP:   Recent Labs  Lab 07/25/17  0311 07/26/17  0354   *  130*  130*  130* 129*  129*   K 4.1  4.1  4.1  4.1 4.4  4.4   CL 90*  90*  90*  90* 93*  93*   CO2 26  26  26  26 26  26     104  104  104 101  101   BUN 29*  29*  29*  29* 29*  29*   CREATININE 1.7*  1.7*  1.7*  1.7* 1.4  1.4   CALCIUM 9.6  9.6  9.6  9.6 8.8  8.8   PROT 7.9 7.4   ALBUMIN 3.2* 2.9*   BILITOT 2.0* 1.9*   ALKPHOS 63 58   AST 17 24   ALT 11 11   ANIONGAP 14  14  14  14 10  10   EGFRNONAA 40.8*  40.8*  40.8*  40.8* 51.6*  51.6*     Urine Culture:   Recent Labs  Lab 07/24/17  1139   LABURIN ENTEROCOCCUS SPECIES>100,000 cfu/mlIdentification and susceptibility pending     Urine Studies:   Recent Labs  Lab 07/24/17  1139   COLORU Yellow   APPEARANCEUA Cloudy*   PHUR 6.0   SPECGRAV 1.010   PROTEINUA Negative   GLUCUA Negative   KETONESU Negative   BILIRUBINUA Negative   OCCULTUA 3+*   NITRITE Negative   UROBILINOGEN 2.0   LEUKOCYTESUR 3+*   RBCUA 32*   WBCUA >100*   BACTERIA Few*       Significant Imaging: I have reviewed all pertinent imaging results/findings within the past 24 hours.               Never Used    Tobacco comment: currently smoking 1-2 cigs a day   Vaping Use    Vaping Use: Never used   Substance and Sexual Activity    Alcohol use: Yes     Alcohol/week: 6.0 standard drinks     Types: 6 Cans of beer per week     Comment: 2 beers a day, 1 shot of hard liquor once a week for years and one or two beer every Friday and maybe Saturday    Drug use: Not Currently     Frequency: 1.0 times per week     Types: Marijuana     Comment: patient used to abuse crack cocaine and marijuana     Sexual activity: Yes     Partners: Male     Birth control/protection: None   Other Topics Concern    Not on file   Social History Narrative    Not on file     Social Determinants of Health     Financial Resource Strain:     Difficulty of Paying Living Expenses: Not on file   Food Insecurity:     Worried About Running Out of Food in the Last Year: Not on file    [employer] of Food in the Last Year: Not on file   Transportation Needs:     Lack of Transportation (Medical): Not on file    Lack of Transportation (Non-Medical):  Not on file   Physical Activity:     Days of Exercise per Week: Not on file    Minutes of Exercise per Session: Not on file   Stress:     Feeling of Stress : Not on file   Social Connections:     Frequency of Communication with Friends and Family: Not on file    Frequency of Social Gatherings with Friends and Family: Not on file    Attends Hinduism Services: Not on file    Active Member of [address] [place] [address] or Organizations: Not on file    Attends Club or Organization Meetings: Not on file    Marital Status: Not on file   Intimate Partner Violence:     Fear of Current or Ex-Partner: Not on file    Emotionally Abused: Not on file    Physically Abused: Not on file    Sexually Abused: Not on file   Housing Stability:     Unable to Pay for Housing in the Last Year: Not on file    Number of [place] in the Last Year: Not on file    Unstable Housing in the Last Year: Not on file     Social History     Tobacco Use   Smoking Status Current Some Day Smoker    Packs/day: 0.25    Years: 3.00    Pack years: 0.75    Types: Cigarettes   Smokeless Tobacco Never Used   Tobacco Comment    currently smoking 1-2 cigs a day        Temp (24hrs), Av.3 °F (36.8 °C), Min:97.6 °F (36.4 °C), Max:98.9 °F (37.2 °C)    Visit Vitals  /88 (BP 1 Location: Left upper arm, BP Patient Position: At rest)   Pulse 95   Temp 98.9 °F (37.2 °C)   Resp 20   Wt 76 kg (167 lb 8.8 oz) Comment: used prev weight   SpO2 100%   Breastfeeding No   BMI 31.66 kg/m²       ROS: Unable to obtain due to patient factors    Physical Exam:       General: laying on bed, sleepy, in restraints  HEENT: NCAT, no scleral icterus  Neck: No LAD, no JVD  Lungs: CTAB, no wheezing, rales, or crackles. In no respiratory distress. CV: RRR, S1/S2 normal.   Abdomen: Soft, no tenderness No guarding or rigidity. Extremities: No cyanosis or edema. Skin: No rashes or lesions on visible skin  Back: Not examined  Neuro: Sleepy, moves all 4 extremities. No gross motor or sensory deficits noted  Labs: Results:   Chemistry Recent Labs     22  0408 22  1805 22  0515 22  0348 22  0348 22  0945 22  0945   GLU 82 68* 79   < > 91   < > 78    139 138   < > 134*   < > 142   K 3.0* 3.3* 2.9*   < > 3.9   < > 3.0*    106 103   < > 100   < > 106   CO2 22 26 27   < > 26   < > 29   BUN 3* 2* 2*   < > 3*   < > 2*   CREA 0.38* 0.28* 0.44*   < > 0.58*   < > 0.43*   CA 7.2* 7.3* 7.1*   < > 7.7*   < > 7.7*   AGAP 10 7 8   < > 8   < > 7   BUCR 8* 7* 5*   < > 5*   < > 5*   AP  --   --  148*  --  192*  --  175*   TP  --   --  6.3*  --  7.5  --  6.9   ALB  --   --  2.3*  --  2.8*  --  2.5*   GLOB  --   --  4.0  --  4.7*  --  4.4*   AGRAT  --   --  0.6*  --  0.6*  --  0.6*    < > = values in this interval not displayed.       CBC w/Diff Recent Labs     22  0408 22  0515 22  0348   WBC 5.6 6.0 3.9*   RBC 2.31* 2.54* 2.52*   HGB 7.8* 8.3* 8.2*   HCT 25.1* 26.5* 26.3*   PLT 69* 53* 61*   GRANS 69 75* 67   LYMPH 14* 1* 19*   EOS 0 0 0      Microbiology Recent Labs     07/04/22 2040 07/04/22 2032   CULT NO GROWTH 2 DAYS NO GROWTH 2 DAYS          RADIOLOGY:    All available imaging studies/reports in [facility] for this admission were reviewed      Disclaimer: Sections of this note are dictated utilizing voice recognition software, which may have resulted in some phonetic based errors in grammar and contents. Even though attempts were made to correct all the mistakes, some may have been missed, and remained in the body of the document. If questions arise, please contact our department.     Dr. John Clifford, Infectious Disease Specialist  [phone]  July 6, 2022  2:05 PM

## 2022-07-15 NOTE — ASSESSMENT & PLAN NOTE
HM3 implanted 7/2017 as DT  Pulsatile on exam   INR subtherapeutic at 1.9   INR goal of 2-3  Will await repeat INR this AM and initiate heparin gtt if <2   Continue Coumadin at 2mg daily except for 3mg on tuesdays   LDH pending this AM  Continue amlodipine 10mg daily, hydralazine 50mg TID and lasix 80mg BID  MAP goal of 60-80   show

## 2022-09-06 NOTE — NURSING TRANSFER
Nursing Transfer Note      3/6/2018     Transfer To: EGD    Transfer via stretcher    Transfer with cardiac monitoring    Transported by RN    Notified: spouse       none

## 2022-09-26 NOTE — NURSING
[phone] paged Dr Subha Krishna  09/26/22 Johanny Vazquez returned call      Oli Carmichael  09/26/22 [address] [place], Endoscopy, [phone], called to inform patient/family that transport will arrive at approximately 1300 hours.  Heparin turned off per Endoscopy.

## 2022-11-03 NOTE — PROGRESS NOTES
Erikry,  Patient talked to Dr Aviles yesterday and she is calling back to give him some updated info that she feels he should know.  Please call      Vilma Stewart on 11/3/2022 at 7:28 AM     Patient diaphoretic and lightheaded while walking in hallway 5 hours after taking mexitil. BP checked in the room, doppler 87 and glucose 89. ECG and bedside device interrogation done and negative for arrhythmia.  Talked to Dr. Bautista. Will repeat labs including BMP, CBC,, BNP.     A/p    If volume depleted, or anemic, we will give fluid  Will consider echo or Ct to evaluate LVAD position.

## 2022-11-22 NOTE — PLAN OF CARE
Problem: Patient Care Overview  Goal: Plan of Care Review  Outcome: Ongoing (interventions implemented as appropriate)  Pt free from falls and injury during shift. H+H 7.5/23.6 CBC ordered q12hr. Pt monitored for bleeding. Doppler and VAD WNL. Pt diet advanced to regular diet. No bm in 3 days, PRN miralax administered. IVP Protonix 40mg administered. INR 2.8. VSS, pt voiced no complaints. POC updated with pt, will continue to monitor.       · Encouraged lifestyle modifications

## 2022-12-26 NOTE — PROGRESS NOTES
[facility]  Heart Transplant  Progress Note    Patient Name: Suman Hayden  MRN: [id number]  Admission Date: 6/25/2018  Hospital Length of Stay: 0 days  Attending Physician: Angie Torres MD  Primary Care Provider: Joe Ernst MD  Principal Problem:Tingling in extremities    Subjective:     Interval History: No complaints overnight. Bilateral arm tingling has completely resolved. Wants to go home. Pharmacy will discuss coumadin dose with patient as he is still supratherapeutic.     Continuous Infusions:  Scheduled Meds:   amiodarone  400 mg Oral Daily    amLODIPine  5 mg Oral Daily    dronabinol  5 mg Oral TID AC    furosemide  20 mg Oral BID    lisinopril  5 mg Oral Daily    magnesium oxide  400 mg Oral BID    pantoprazole  40 mg Oral Daily    pravastatin  20 mg Oral QHS     PRN Meds:diphenhydrAMINE, HYDROcodone-acetaminophen, polyethylene glycol    Review of patient's allergies indicates:  No Known Allergies  Objective:     Vital Signs (Most Recent):  Temp: 98.3 °F (36.8 °C) (06/26/18 0901)  Pulse: 80 (06/26/18 0901)  Resp: 16 (06/26/18 0901)  BP: (!) 76/0 (06/26/18 0901)  SpO2: 98 % (06/26/18 0901) Vital Signs (24h Range):  Temp:  [97.4 °F (36.3 °C)-98.6 °F (37 °C)] 98.3 °F (36.8 °C)  Pulse:  [78-85] 80  Resp:  [16-18] 16  SpO2:  [97 %-98 %] 98 %  BP: (70-88)/(0-64) 76/0     Patient Vitals for the past 72 hrs (Last 3 readings):   Weight   06/25/18 0556 73 kg (160 lb 15 oz)   06/25/18 0141 72.1 kg (159 lb)     Body mass index is 23.77 kg/m².      Intake/Output Summary (Last 24 hours) at 06/26/18 1018  Last data filed at 06/26/18 0900   Gross per 24 hour   Intake             1500 ml   Output             2775 ml   Net            -1275 ml       Hemodynamic Parameters:         Physical Exam   Constitutional: He appears well-developed and well-nourished.   HENT:   Head: Normocephalic and atraumatic.   Neck: Normal range of motion. No JVD present.   Cardiovascular: Normal rate and  "regular rhythm.    VAD hum smooth, non-pulsitile    Pulmonary/Chest: Effort normal and breath sounds normal. No respiratory distress.   Abdominal: Soft. Bowel sounds are normal.   Musculoskeletal: He exhibits no edema.   Skin: Skin is warm and dry. Capillary refill takes 2 to 3 seconds.   Psychiatric: He has a normal mood and affect. His behavior is normal. Judgment and thought content normal.   Nursing note and vitals reviewed.      Significant Labs:  CBC:    Recent Labs  Lab 06/25/18 0300 06/26/18 0421   WBC 5.72 4.47   RBC 3.14* 3.35*   HGB 7.5* 7.9*   HCT 24.6* 26.0*    187   MCV 78* 78*   MCH 23.9* 23.6*   MCHC 30.5* 30.4*     BNP:    Recent Labs  Lab 06/25/18 0300   *     CMP:    Recent Labs  Lab 06/25/18 0300 06/26/18  0421   GLU 91 71   CALCIUM 9.5 9.5   ALBUMIN 3.8  --    PROT 7.2  --    * 135*   K 4.2 4.2   CO2 22* 25    102   BUN 21 15   CREATININE 1.1 1.1   ALKPHOS 58  --    ALT 13  --    AST 27  --    BILITOT 0.4  --       Coagulation:     Recent Labs  Lab 06/21/18 06/25/18 0300 06/26/18 0421   INR 3.0 4.1* 3.5*   APTT  --   --  34.9*     LDH:    Recent Labs  Lab 06/26/18  0421        Microbiology:  Microbiology Results (last 7 days)     ** No results found for the last 168 hours. **          I have reviewed all pertinent labs within the past 24 hours.    Estimated Creatinine Clearance: 64.3 mL/min (based on SCr of 1.1 mg/dL).    Diagnostic Results:  I have reviewed all pertinent imaging results/findings within the past 24 hours.    Assessment and Plan:     67 year old man with stage D HFrEF/NICM s/p HM3 @5200, hx of GIB (8/17, 12/17, 1/18, 3/18) s/p ex lap and partial SB resection, Medtronic ICD, transferred from  via ambulance for bilateral arm tingling that started yesterday at 5pm. He stated the sensation was sudden in onset and was most likened to "putting your arms in the freezer". The sensation then changed to a hot sensation at some point yesterday " "evening. He called the on call VAD coordinator and was told he had a bed at [facility] so he was transferred by ambulance. He was recently admitted and discharged on 6/15 for ICD shock thought to be due to aflutter with abberancy as well as low hgb. He was started on amiodarone during his last admission for this with a plan for ablation outpatient. He is currently taking 400mg once a day. Since discharge he said he had been feeling overall well however had some generalized weakness and feeling "not right" which he attributes to the amiodarone. He denies any symptoms of melena or hematochezia at this time.     In the ED his vitals were WNL and his device had no new arrythmias(last interrogation 6/15). His INR was elevated at 4.1.       * Tingling in extremities    Pt presented with tingling and bilateral hands x 1 day that has not improved, no clinical evidence of any extremity abnormality, no swelling noted and distal pulses intact(ocassionally palpable), of note he has just finished his load of amiodarone now on 400 mg daily and has been on 2 antibiotics for possible DL infection last day is 6/25 (cipro and doxycycline). Unclear etiology at this time (no weakness to or focal deficits to suggest CVA, No hx of diabetes, TSH is normal). 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   - amio level still in process  - tingling has completely resolved. Will follow up with EP for ablation and possible cessation of amiodarone        LVAD (left ventricular assist device) present    Procedure: Device Interrogation Including analysis of device parameters  Current Settings: Ventricular Assist Device  Review of device function is stable/unstable stable    TXP LVAD INTERROGATIONS 6/26/2018 6/25/2018 6/25/2018 6/25/2018 6/25/2018 6/25/2018 6/25/2018   Type HeartMate3 HeartMate3 HeartMate3 HeartMate3 HeartMate3 HeartMate3 HeartMate3   Flow 4.3 4.1 4.0 4.2 4.3 4.0 4.1   Speed 5200 5200 5200 5200 5200 " 5200 5200   PI 3.6 3.7 3.9 3.0 3.8 3.7 3.6   Power ([place]) 3.5 3.7 3.6 3.5 3.5 3.5 3.5   LSL 4800 4800 4800 - - - -   Pulsatility - - - - - - -     HM3 @ 5200 RPM  INR goal 2-3, on coumadin, (held 6/25) supra-therapeutic likely due to amiodarone initiation, will need dose adjustment  Antiplatelets: ASA stopped 12/2017 for GIB/ileal ulcer  MAP goal 60-80, continue home meds  VAD interrogation in AM          Anemia    - Hx of GI bleeding, Hb is stable        AICD (automatic cardioverter/defibrillator) present    - No ICD shocks noted and no new arrhythmias. Plan for ablation outpatient (sees Dr. Winchester in [place]). May need to be done sooner than later if the amiodarone is indeed causing his neuropathy. Emailed  for appointment later this month with Annabella regarding ablation.          Essential hypertension    -Continue home medications            Isabel Chen PA-C  Heart Transplant  [facility]   normal...

## 2023-02-04 NOTE — ASSESSMENT & PLAN NOTE
-Overdrive paced at bedside in ER by Cards Fellow.   -EP consulted- Continue mexiletine 200 mg Q12H and amio 600mg daily per Dr Zee.  -Keep K>4, Mag>2.     ED Handoff Info      Note: Please review patient's handover report in Epic under Summary tab under ED to IP Handoff      ED Nurse: Domi Knox RN   Extension:  (B-Team)      Precautions:    [x] Fall   [] Seizure   [] Limb Precaution   [] Language Barrier    [] Suicidal/Homicidal/Elopement Risk   [] Restraints       Mental Status: Alert calm & cooperative   Baseline? [x] Yes [] No      Cardiac/Tele on arrival: Normal Sinus Rhythm   Baseline? [x] Yes [] No      Respiratory needs on arrival: None   Baseline? [x] Yes [] No      Medications pending and/or not given in ED: None      Pending Labs/Tests to be done on Unit: None      Additional Information:    Frost Catheter: [] Yes [] No [x]Not Applicable   Indication: Not applicable      Preferred Language:  English      Living Arrangement:  unknown      COMMENTS:

## 2023-03-03 NOTE — CONSULTS
Mr. Hayden is a 66 yo M with NICM s/p ICD, HLD, and HTN who is scheduled for LVAD placement later this month as DT.  He was undergoing 6 min walk prior to clinic appointment today and had an episode of syncope associated with ICD discharge.    He was brought to the ED and was awake and alert.  Interrogation showed 5 shocks in the last few days and 11 total episodes (many responded to ATP).    He was on 200 mg amiodarone daily, but unclear if this was for afib or previous VT.  He was unaware he had been shocked by his device prior to today, but his wife describes a similar episode of near syncope yesterday while he was at [facility].  Both episodes were associated with diaphoresis and prodrome.      Currently, he feels back to his normal state and denies any dyspnea or chest discomfort.    Had a recent RHC that showed low CO/CI on 5 of .    Plan:  - Admit to HTS  - EP consult  - Labs pending  - Cont   - IV amio bolus and drip    Discussed with HTS fellow.    Kimmie Stein MD, MPH  Cardiology Fellow   [1] : T1 [c] : c [0] : N0 [X] : MX [0-10] : 0 -10 ng/mL [Biopsy with Fusion] : Patient had a biopsy with fusion on [7(3+4)] : Fusion Biopsy [place] Score: 7(3+4) [Biopsy results sent to PCP/Referring Physician] : Biopsy results sent to PCP/Referring Physician [4] : 4 [BiopsyDate] : 09/22 [MeasuredProstateVolume] : 28 [TotalCores] : 14 [TotalPositiveCores] : 2 [MaxCoreInvolvement] : 25 [IIB] : IIB [Focal Ablation] : Focal Ablation

## 2023-03-24 NOTE — ASSESSMENT & PLAN NOTE
-Patient is pulsatile  -MAP goal 60-80 mmHg  -Blood pressure uncontrolled over the last 24 hours  -Antihypertensive medications include Amlodipine  -Would adjust current regimen & add additional agent.        General

## 2023-05-01 NOTE — ASSESSMENT & PLAN NOTE
No ICD shocks noted and no new arrhythmias. Plan for ablation outpatient(sees Dr. Winchester in [place]). May need to be done sooner than later if the amiodarone is indeed causing his neuropathy.      Cyclophosphamide Counseling:  I discussed with the patient the risks of cyclophosphamide including but not limited to hair loss, hormonal abnormalities, decreased fertility, abdominal pain, diarrhea, nausea and vomiting, bone marrow suppression and infection. The patient understands that monitoring is required while taking this medication.

## 2023-06-21 NOTE — OP NOTE
DATE OF PROCEDURE:  07/28/2017    PREOPERATIVE DIAGNOSES:  1.  Status post implantation of left ventricular assist device.  2.  Open chest.  3.  Diffuse coagulopathy.    POSTOPERATIVE DIAGNOSES:  1.  Status post implantation of left ventricular assist device.  2.  Open chest.  3.  Diffuse coagulopathy.    OPERATIONS:  1.  Washout of the mediastinum.  2.  Creation of neopericardium using Elton-David membrane.  3.  Sternal closure.    STAFF SURGEON:  Sunny Downing M.D.    FIRST ASSISTANT:  Travon Cain.    ANESTHESIA:  GETA.    EBL: 50 cc    KEY FINDINGS OF THE OPERATION:  1.  Excellent hemodynamics, not much clot noted.  2.  Speed was increased to 5000 RPMs.    INDICATION OF OPERATION:  This is a 67-year-old gentleman who underwent left   ventricular assist device placement 24 hours prior.  Postoperatively, chest was   left open due to diffuse coagulopathy and severely depressed RV function.    Overnight, the patient did very well and an informed consent was obtained for   possible closure or placement of RVAD.    DESCRIPTION OF OPERATION:  The patient was brought to the Operating Room and   placed in a supine position.  After induction of anesthesia, area was prepped   and draped in the usual sterile fashion.  Previously placed temporary chest   closuring dressing was taken down.  A chest retractor was placed.  Copious   amount of irrigation was used to irrigate the chest cavity.  Chest tubes were   irrigated and repositioned.  Once that was done, FloSeal was used to spray to   obtain another layer of covering to minimize adhesions.  Jose-pericardial   creation was used by using a #1 mm Elton-David membrane and sutured to the cut   edges of the pericardium to help in reentry at the time of transplant or VAD   exchange.  Once that was done, the outflow graft was tacked underneath the right   hemisternum.  The sternum was then approximated by #6 stainless steel wires.    Skin and subcutaneous tissues were closed in  LOV: 5/11/2023    RTO: No future appointments. LRF: 8/13/22          Controlled Substance Monitoring:    Acute and Chronic Pain Monitoring:   No flowsheet data found. multiple layers.  Sterile dressing   was applied.  Terminal count of needles, sponges, and instrument was found to be   correct.  The patient was taken back to the Intensive Care Unit in a stable   condition.    MEDICARE ATTESTATION:  Due to unavailability of an adequately trained   Cardiothoracic Surgery resident, I performed all parts of the operation myself.      DAVIDSON  dd: 07/28/2017 13:36:41 (CDT)  td: 07/28/2017 14:31:43 (CDT)  Doc ID   #[id number]  Job ID #[id number]    CC:

## 2023-08-13 NOTE — PROCEDURES
Discharge Planner Post-Acute Rehab OT:      Discharge Plan: unknown. Pt does not have a home at this time.      Precautions: RUE pain and weakness s/p infection, hx of L BKA(pt was I using prosthetic leg at PLOF) , catheter     Current Status:  ADLs:  Mobility: Stand pivot transfer using FWW CGA-min A with prosthetic leg on, without the prosthetic leg on pt requires min A towards R side for stand pivot   Grooming: SBA set up sitting  Dressing: total A LB, UB mod A   Bathing: min A with stand pivot transfer from WC to shower chair using grab bar with prosthetic leg on, mod A with tasks  Toileting: stand pivot to BSC using FWW min A for transfer total A for tasks  IADLs: TBD  Vision/Cognition: appears WNL, wears glasses full time     Assessment: Pt completing functional transfers with CGA-min A using FWW when pt has prosthetic leg on today. Pt appears more confident with his transfers and increased I. Pt reports increased soreness with R upper arm after PROM session in OT.     Pt presenting with significantly declined level of I. Pt I with all I/ADLs using prosthetic leg. Pt reports rarely taking the leg off even when he was sleeping at PLOF. Level of I also limited by R shoulder weakness and pain. Pt would benefit from skilled OT service to reach max potential level of I in I/ADLs     Other Barriers to Discharge (DME, Family Training, etc): TBD                              Patient AAO with family at bedside. VAD interrogation completed this AM in the event changes needed to be made. Will continue to monitor for further issues.     Pulsatile: Yes, intermittent   VAD Sounds: HM3  Smooth  Problems / Issues / Alarms with VAD if any: None noted  HCT: 22     VAD Interrogation:  TXP LVAD INTERROGATIONS 1/30/2018 1/30/2018 1/30/2018 1/29/2018 1/29/2018 1/29/2018 1/29/2018   Type (No Data) HeartMate3 HeartMate3 HeartMate3 HeartMate3 - -   Flow - 3.7 4.0 4.2 4.3 4.3 4.1   Speed - 5200 5250 5200 5200 5100 5200   PI - 5.5 3.7 3.6 3.7 4.2 3.7   Power ([place]) - 3.5 3.5 3.5 3.6 3.7 3.5   LSL - 4800 - 4800 - - -   Pulsatility - - Pulse - Pulse - -   }

## 2023-09-14 NOTE — DISCHARGE SUMMARY
[facility]  Heart Transplant  Discharge Summary      Patient Name: Suman Hayden  MRN: [id number]  Admission Date: 12/6/2017  Hospital Length of Stay: 13 days  Discharge Date and Time: 12/22/2017 3:12 PM  Attending Physician: No att. providers found   Discharging Provider: Susannah Elizabeth PA-C  Primary Care Provider: Joe Ernst MD     HPI: 66 y/o male with a PMHx of NICM (EF 10%) s/p HeartMate 3 Implanted 7/27/2017 as DT, HTN, DLD and recent ICD revision on 11/20/17 with Dr. Vidal (DC ICD placed with active RA/LV leads. RV lead capped during revision) that was complicated by pocket hematoma. He was discharged on 11/22/17 with plan to follow up in the [facility] EP clinic in a week but had not had follow up yet given the recent thanksgiving holiday, admitted as a transfer after presenting to [facility] with ICD shocks.  His ICD was reprogrammed in [facility] as the shocks for VT were inappropriate , rhythm was in fact Atrial Tachycardia. His INR was subtherapeutic and he was bridged with heparin. His hematoma was noted to be improving.   He now presents to LVAD clinic with Hg 5.7 with suspected bleeding.     Procedure(s) (LRB):  DEVICE ASSISTED ENTEROSCOPY-ANTEROGRADE (N/A)     Hospital Course: Pt was admitted for possible GI bleed. ASA was held and pt was transfused PRBCs. GI was consulted- EGD 12/8/17 showed Normal esophagus; Gastritis and duodenitis; No gastrointestinal bleeding seen.  VCE done 12/12/17 showed ileal ulcers.  Enteroscopy done 12/14/17 showed single (solitary) ulcer in the proximal ileum which was biopsied and stricture in the proximal ileum (due to the ulcer). Bx of ulcer showed findings constistent with NSAID induced ulcer. GI also requested viral stains which are still pending at d/c. Ulcer is likely source of GIB. Will keep off ASA. Pt is s/p 5 units total during hospitalization.  Plan for f/u in GI clinic. Pt did not tolerate PO Iron at home and tried IV  BP review note Iron during hospitalization as well but pt did not tolerate this either. D/C'ed pt home with with INR and CBC in 2 days with Home Health. He will now be off ASA and will be on PPI BID.    Consults         Status Ordering Provider     Inpatient consult to Gastroenterology  Once     Provider:  (Not yet assigned)    MARY ELLEN Pizarro     Inpatient consult to Midline team  Once     Provider:  (Not yet assigned)    LENIN Reed              Pending Diagnostic Studies:     None        Final Active Diagnoses:    Diagnosis Date Noted POA    PRINCIPAL PROBLEM:  LVAD (left ventricular assist device) present [Z95.811] 07/29/2017 Not Applicable    Normocytic anemia [D64.9] 12/07/2017 Yes    Anemia [D64.9] 12/08/2017 Yes    Subtherapeutic international normalized ratio (INR) [R79.1] 11/30/2017 Yes    Atrial tachycardia [I47.1]  Yes     Chronic    Chronic systolic congestive heart failure [I50.22] 07/07/2017 Yes    Essential hypertension [I10] 07/05/2017 Yes      Problems Resolved During this Admission:    Diagnosis Date Noted Date Resolved POA      Discharged Condition: stable    Disposition: [place] [facility]      Patient Instructions:     Diet general   Order Specific Question Answer Comments   Na restriction, if any: 2gNa      Activity as tolerated     Call MD for:  temperature >100.4     Call MD for:  persistent nausea and vomiting or diarrhea     Call MD for:  severe uncontrolled pain     Call MD for:  redness, tenderness, or signs of infection (pain, swelling, redness, odor or green/yellow discharge around incision site)     Call MD for:  difficulty breathing or increased cough     Call MD for:  severe persistent headache     Call MD for:  persistent dizziness, light-headedness, or visual disturbances     Call MD for:  increased confusion or weakness       Medications:  Reconciled Home Medications:   Discharge Medication List as of 12/19/2017 12:11 PM      START taking these  medications    Details   furosemide (LASIX) 40 MG tablet Take 1 tablet (40 mg total) by mouth 2 (two) times daily., Starting Tue 12/19/2017, Normal      pantoprazole (PROTONIX) 40 MG tablet Take 1 tablet (40 mg total) by mouth 2 (two) times daily before meals., Starting Tue 12/19/2017, Until Wed 12/19/2018, Normal         CONTINUE these medications which have CHANGED    Details   polyethylene glycol (GLYCOLAX) 17 gram/dose powder Take 17 g by mouth daily as needed., Starting Tue 12/19/2017, No Print      potassium chloride SA (K-DUR,KLOR-CON) 20 MEQ tablet Take 2 tablets (40 mEq total) by mouth once daily., Starting Tue 12/19/2017, No Print         CONTINUE these medications which have NOT CHANGED    Details   dronabinol (MARINOL) 5 MG capsule Take 1 capsule (5 mg total) by mouth 3 (three) times daily before meals., Starting Wed 12/6/2017, No Print      hydrALAZINE (APRESOLINE) 50 MG tablet Take 1 tablet (50 mg total) by mouth every 8 (eight) hours., Starting Thu 10/26/2017, Until Fri 10/26/2018, Normal      magnesium oxide (MAG-OX) 400 mg tablet Take 1 tablet (400 mg total) by mouth 2 (two) times daily., Starting Wed 11/22/2017, OTC      pravastatin (PRAVACHOL) 20 MG tablet Take 1 tablet (20 mg total) by mouth every evening., Starting Thu 10/26/2017, Normal      warfarin (COUMADIN) 2 MG tablet Take 3mg orally on Tuesday, Thursday and Saturday and 2mg orally on other days as directed by coumadin clinic, Normal      amLODIPine (NORVASC) 10 MG tablet Take 1 tablet (10 mg total) by mouth once daily., Starting Wed 12/6/2017, Until Thu 12/6/2018, Normal             Susannah Elizabeth PA-C  Heart Transplant  [facility]

## 2024-01-03 NOTE — PLAN OF CARE
Problem: Patient Care Overview  Goal: Plan of Care Review  Outcome: Ongoing (interventions implemented as appropriate)  POC reviewed with pt and spouse with both verbalizing understanding. VSS and pt denies presence of pain. LVAD numbers WNL, no alarms noted on monitor, and dressing due for change 9/17/18. Pt continues on Amiodarone and Mexiletine PO. Fall bundle implemented and pt has remained free of falls and injuries. Pt in no apparent sign of distress, wife at bedside, will continue to monitor.        [de-identified] : Examination of her right wrist and hand demonstrates her portal incisions to be well-healed.  There is improved flexion and extension of the wrist.  She has full flexion and extension of the digits.  There is decreased tenderness along the TFCC ligament and decreased pain with pronation and supination.  She remains neurovascularly intact distally.

## 2024-06-02 NOTE — SUBJECTIVE & OBJECTIVE
1815 Daughter turned on pt call light on this nurse responded as she informed pt he needs to use it to go to the bathroom. Pt is confused. When asked where he is pt replied\"I don't know? [facility]\" Nurse asked who told you that ? Pt \"My daughter as he looked at his daughter. Nurse \"would [facility] be a bed in [facility]?\" Pt replied \"No\" reoriented to placer person and time. Bed alarm on for pt safety. Pt has been un clear at different intervals today with his mentation. Daughter states this is different.    4 Eyes Skin Assessment     NAME:  Abelardo Marquis  YOB: 1941  MEDICAL RECORD NUMBER:  [id number]    The patient is being assessed for  Admission    I agree that at least one RN has performed a thorough Head to Toe Skin Assessment on the patient. ALL assessment sites listed below have been assessed.      Areas assessed by both nurses:    Head, Face, Ears, Shoulders, Back, Chest, Arms, Elbows, Hands, Sacrum. Buttock, Coccyx, Ischium, Legs. Feet and Heels, and Under Medical Devices         Does the Patient have a Wound? No noted wound(s)       Yomi Prevention initiated by RN: No  Wound Care Orders initiated by RN: No    Pressure Injury (Stage 3,4, Unstageable, DTI, NWPT, and Complex wounds) if present, place Wound referral order by RN under : No    New Ostomies, if present place, Ostomy referral order under : No     Nurse 1 eSignature: Electronically signed by Marsha Galarza RN on 5/30/24 at 11:30 PM EDT    **SHARE this note so that the co-signing nurse can place an eSignature**    Nurse 2 eSignature: Electronically signed by Pilar Garcia RN on 5/31/24 at 12:34 AM EDT    Blood blank called with unit ready. Requested to send at this time.   Critical HBG result paged to Andrea Naegele.    DOING WELL NO ABD COMPLAINTS OR ACTIVE / GROSS GIT BLEEDING HAS APPT FOR  SPINAL STIMULATOR TO BE PLACED WITH PAIN MANAGEMENT TOMORROW PT AWAKE ALERT AND ORIENTED X 3  VITALS STABLE   LABS NOTED HB STABLE AT 8.1  WILL CPM  POSSIBLE D/C IN AM D/W PT -DAUGHTER -AND TIA COPELAND PT TO CALL FOR OUTPT CE ALSO FOR FURTHER W/U OF HIS ANEMIA / GIT BLEEDING   Dr INDIRA Moore made aware of blood sugar of 359. RN to give 4 units of lispro insulin per sliding scale, no addition to be given insulin at this time.    Evaluated the patient for complaint of confusion after returning back from procedure.  Patient was lying in bed, in no acute distress, patient's sister was at bedside.  Patient himself states that he was a little bit confused earlier.  Sister reported that he was having hallucinations (thought that there were some children playing outside the room).  Patient is currently oriented x 3-knew he was in the hospital, knew the year, month, date.  Knew his home address.  Knew the reason why he was in the hospital.  Also was able to recall that he had chicken, mashed potatoes, gravy, grapes for his dinner.  Denied any headache, nausea, vomiting, has chronic tingling in his left upper extremity (third and fourth fingers, feet).  He was able to recall that he is a diabetic and takes Basaglar.  Was unable to recall his PCPs name but stated that she was on maternity leave and someone else was covering for her.  Speech was clear, no focal deficits noted.  CBC, BMP, VBG, ammonia, UA ordered.  Discussed with patient's RN-Morgan Molina.  Will monitor the patient closely.   First 15 minute observation completed at this time. Patient tolerating. Rate changed at this time to run remaining over ordered 3 hours.    Interval History: NAEON. Feels well and denies any complaints. Tentatively scheduled for LVAD tomorrow pending ECHO results. Blood cx, UA, Ucx sent. No delgado currently. Left IJ and IABP 4 days old. Afebrile and no leukocytosis noted. T BILI still at 2.0. RUQ sonogram revealed GB sludge so the patient was started on cholestyramine.     Continuous Infusions:   DOBUTamine 5 mcg/kg/min (07/24/17 1200)    furosemide (LASIX) 5 mg/mL infusion (non-titrating) 15 mg/hr (07/24/17 1200)    heparin (porcine) in D5W 16 Units/kg/hr (07/24/17 1200)     Scheduled Meds:   amiodarone  400 mg Oral BID    cholestyramine  1 packet Oral BID    famotidine  20 mg Oral Daily    magnesium oxide  400 mg Oral BID    polyethylene glycol  17 g Oral Daily    pravastatin  20 mg Oral QHS    senna  8.6 mg Oral Daily    sodium chloride 0.9%  3 mL Intravenous Q8H     PRN Meds:bisacodyl, ondansetron, pneumococcal vaccine    Review of patient's allergies indicates:  No Known Allergies  Objective:     Vital Signs (Most Recent):  Temp: 98 °F (36.7 °C) (07/24/17 1100)  Pulse: 60 (07/24/17 1210)  Resp: (!) 26 (07/24/17 1210)  BP: 130/75 (07/24/17 0600)  SpO2: 98 % (07/24/17 1210) Vital Signs (24h Range):  Temp:  [97.7 °F (36.5 °C)-98.1 °F (36.7 °C)] 98 °F (36.7 °C)  Pulse:  [59-60] 60  Resp:  [14-32] 26  SpO2:  [95 %-99 %] 98 %  BP: (130)/(75) 130/75     Weight: 79.8 kg (175 lb 14.8 oz)  Body mass index is 26.75 kg/m².      Intake/Output Summary (Last 24 hours) at 07/24/17 1225  Last data filed at 07/24/17 1200   Gross per 24 hour   Intake           1519.8 ml   Output              875 ml   Net            644.8 ml       Hemodynamic Parameters:       Telemetry: no events    Physical Exam  Constitutional: NAD, conversant  HEENT: Sclera anicteric, PERRLA, EOMI  Neck: Positive JVD up to angle of jaw, no carotid bruits  CV: RRR, no murmur, normal S1/S2, +S3, No Pericardial rub  Pulm: CTAB with no wheezes, rales, or rhonchi  GI: Abdomen soft, NTND,  Occupational Therapy  [facility] ACUTE CARE OCCUPATIONAL THERAPY EVALUATION    Abelardo Marquis, 1941, 3121/3121-A, 5/31/2024    Discharge Recommendation: Encourage facility for moderate post-acute rehabilitation, anticipate 1-2 hours per day and 5 days per week.        History:  [place]:  The primary encounter diagnosis was Anemia requiring transfusions. Diagnoses of Acute kidney injury superimposed on CKD (HCC) and Hyperglycemia were also pertinent to this visit.  Past Medical History:   Diagnosis Date    Arthritis     Diabetes mellitus (HCC)     GERD (gastroesophageal reflux disease)     Gout     left ankle and right shoulder    Hyperlipidemia     Hypertension     MI (myocardial infarction) (HCC)     May 2013         Subjective:  Patient states: \"You don't understand how bad I feel\"  Pain:  denies  Communication with other providers: co-eval w/ PT, handoff to RN  Restrictions: General Precautions, Fall Risk    Home Setup/Prior level of function:    Lives With: Family (daughter and )  Type of Home: House  Home Layout: Two level (stays on 1st floor)  Home Access: Stairs to enter with rails  Entrance Stairs - Number of Steps: 2 steps in back entrance  Bathroom Shower/Tub: Walk-in shower (corner)  Bathroom Equipment:  (outside of shower)  Home Equipment: Cane, Grab bars, Rollator  Has the patient had two or more falls in the past year or any fall with injury in the past year?: No  ADL Assistance: Independent (sits on rollator during grooming tasks)  Homemaking Assistance: Needs assistance (can microwave, daughter does laundry)  Homemaking Responsibilities: Yes  Ambulation Assistance: Independent (use of SPC)  Active : Yes (son in law takes him to appointments or he drives himself)    Examination:  Observation: Supine in bed upon arrival, agreeable to therapy eval.  Vision: WFL  Hearing: slightly [place]  Vitals: Stable vitals throughout session on room air      Body Systems and functions:  ROM:  Patient is known to Dr. Castillo, Notified via perfect serve. We will defer consult at this time.    Patient updated on needing an additional unit at this time. Blood transfusion started.    +BS  Extremities: No LE edema, warm and well perfused, No cyanosis, No clubbing, left groin IABP in place w/o evidence of hematoma  Skin: No ecchymosis, erythema, or ulcers  Psych: AOx3, appropriate affect  Neuro: CNII-XII intact, no focal deficits       Significant Labs:  CBC:    Recent Labs  Lab 07/24/17  0550   WBC 6.10   RBC 5.12   HGB 14.9   HCT 42.7      MCV 83   MCH 29.1   MCHC 34.9     BNP:    Recent Labs  Lab 07/24/17  0420   *     CMP:    Recent Labs  Lab 07/24/17  0420   GLU 96   CALCIUM 9.6   ALBUMIN 3.2*   PROT 7.9   *   K 4.0   CO2 28   CL 90*   BUN 27*   CREATININE 1.7*   ALKPHOS 65   ALT 9*   AST 16   BILITOT 2.0*      Coagulation:     Recent Labs  Lab 07/24/17  0420   INR 1.3*     LDH:    Recent Labs  Lab 07/23/17  1128   *     Microbiology:  Microbiology Results (last 7 days)     Procedure Component Value Units Date/Time    Urine culture [[id number]] Collected:  07/24/17 1139    Order Status:  Sent Specimen:  Urine from Urine, Clean Catch Updated:  07/24/17 1140    Blood culture [[id number]] Collected:  07/24/17 1100    Order Status:  Sent Specimen:  Blood from Peripheral, Antecubital, Left Updated:  07/24/17 1110    Blood culture [[id number]]     Order Status:  No result Specimen:  Blood           I have reviewed all pertinent labs within the past 24 hours.    Estimated Creatinine Clearance: 40.8 mL/min (based on Cr of 1.7).    Diagnostic Results:  I have reviewed and interpreted all pertinent imaging results/findings within the past 24 hours.   Term Goal 3: pt to ambulate 25' with LRAD CGA    Electronically signed by:    Smitha Wong, PT  6/2/2024, 10:29 AM    insulin, hypoglycemia protocol      JEONG Cirrhosis, compensated      Chronic back pain, continue home Norco and Gabapentin PDMP reviewed     Depression, continue home Prozac     Obesity/BMI 34.44     Medical Decision Making:  The following items were considered in medical decision making:  Discussion of patient care with other providers  Reviewed clinical lab tests if any  Reviewed radiology tests if any  Reviewed other diagnostic tests/interventions  Independent review of radiologic images if any  Microbiology cultures and other micro tests if any    Estimated time spent for medical decision-making encompassing complexity of the case, history taking, medication review, physical examination, communication with family, RN, , discussion with specialists, and ancillary staff members to provide accurate care for the patient was around 35 minutes.    The billing in this case is level 3 due to the combination of above and specifically because of symptomatic anemia.    Comment: Please note this report has been produced using speech recognition software and may contain errors related to that system including errors in grammar, punctuation, and spelling, as well as words and phrases that may be inappropriate. If there are any questions or concerns please feel free to contact the dictating provider for clarification.   Diet ADULT DIET; Regular; 5 carb choices (75 gm/meal); Low Sodium (2 gm)   DVT Prophylaxis [] Lovenox, []  Heparin, [] SCDs, [] Ambulation,  [] Eliquis, [] Xarelto  [] Coumadin   Code Status Full Code   Disposition From: Home  Expected Disposition: Likely home  Estimated Date of Discharge: Tomorrow  Patient requires continued admission due to anemia   Surrogate Decision Maker/ POA      Subjective:     Chief Complaint: Dizziness (Sent by home health nurse due to low BP and feeling lightheaded all day. Pt has been having black stool for about 1.5 months. Is supposed to see GI for scope but hasn't been  - 36.0 %    RDW 18.6 (H) 11.7 - 14.9 %    Platelets 141 140 - 440 K/CU MM    MPV 10.4 7.5 - 11.1 FL    Differential Type AUTOMATED DIFFERENTIAL     Neutrophils % 55.7 36 - 66 %    Lymphocytes % 29.0 24 - 44 %    Monocytes % 8.7 (H) 0 - 4 %    Eosinophils % 4.0 (H) 0 - 3 %    Basophils % 0.4 0 - 1 %    Neutrophils Absolute 1.5 K/CU MM    Lymphocytes Absolute 0.8 K/CU MM    Monocytes Absolute 0.2 K/CU MM    Eosinophils Absolute 0.1 K/CU MM    Basophils Absolute 0.0 K/CU MM    Nucleated RBC % 0.0 %    Total Nucleated RBC 0.0 K/CU MM    Total Immature Neutrophil 0.06 K/CU MM    Immature Neutrophil % 2.2 (H) 0 - 0.43 %   Comprehensive Metabolic Panel    Collection Time: 06/01/24  4:52 AM   Result Value Ref Range    Sodium 139 135 - 145 MMOL/L    Potassium 4.4 3.5 - 5.1 MMOL/L    Chloride 106 99 - 110 mMol/L    CO2 24 21 - 32 MMOL/L    Anion Gap 9 7 - 16    Glucose 229 (H) 70 - 99 MG/DL    BUN 35 (H) 6 - 23 MG/DL    Creatinine 1.7 (H) 0.9 - 1.3 MG/DL    Est, Glom Filt Rate 40 (L) >60 mL/min/1.73m2    Calcium 7.7 (L) 8.3 - 10.6 MG/DL    Total Protein 5.1 (L) 6.4 - 8.2 GM/DL    Albumin 3.3 (L) 3.4 - 5.0 GM/DL    Total Bilirubin 0.5 0.0 - 1.0 MG/DL    Alkaline Phosphatase 60 40 - 128 IU/L    ALT 10 10 - 40 U/L    AST 15 15 - 37 IU/L   POCT Glucose    Collection Time: 06/01/24 12:59 PM   Result Value Ref Range    POC Glucose 354 (H) 70 - 99 MG/DL        Imaging/Diagnostics Last 24 Hours   XR CHEST PORTABLE    Result Date: 5/30/2024  EXAM: XR CHEST PORTABLE INDICATION: lightheadedness TECHNIQUE: Portable frontal chest radiographs. COMPARISON: Chest x-ray May 13, 2024 FINDINGS: HEART / MEDIASTINUM: No change. Enlarged cardiac silhouette. Atherosclerosis. Calcified mediastinal lymph nodes. LUNGS/PLEURA:No change.  Lungs are clear. No effusion. No pneumothorax. BONES / SOFT TISSUES: No change. No acute abnormality. OTHER: None.     No change. Enlarged cardiac silhouette. Electronically signed by Everardo Bonner     Collection Time: 05/30/24  8:23 PM   Result Value Ref Range    Troponin, High Sensitivity 71 (HH) 0 - 22 ng/L   TYPE AND SCREEN    Collection Time: 05/30/24  8:23 PM   Result Value Ref Range    ABO/Rh O POSITIVE     Antibody Screen NEGATIVE     Unit Number [id number]     Component LEUKOREDUCED PACKED CELL     Unit Divison 00     Status ISSUED,FINAL     Transfusion Status OK TO TRANSFUSE     Crossmatch Result COMPATIBLE     Unit Number [id number]     Component LEUKO-POOR RED CELLS     Unit Divison 00     Status ISSUED     Transfusion Status OK TO TRANSFUSE     Crossmatch Result COMPATIBLE     Unit Number [id number]     Component LEUKO-POOR RED CELLS     Unit Divison 00     Status ALLOCATED     Transfusion Status OK TO TRANSFUSE     Crossmatch Result COMPATIBLE    Urinalysis with Reflex to Culture    Collection Time: 05/30/24  9:05 PM    Specimen: Urine   Result Value Ref Range    Color, UA YELLOW YELLOW    Clarity, UA CLEAR CLEAR    Glucose, Ur >1000 (A) NEGATIVE MG/DL    Bilirubin, Urine NEGATIVE NEGATIVE MG/DL    Ketones, Urine NEGATIVE NEGATIVE MG/DL    Specific Gravity, UA 1.015 1.001 - 1.035    Blood, Urine NEGATIVE NEGATIVE    pH, Urine 5.0 5.0 - 8.0    Protein, UA NEGATIVE NEGATIVE MG/DL    Urobilinogen, Urine 0.2 0.2 - 1.0 MG/DL    Nitrite Urine, Quantitative NEGATIVE NEGATIVE    Leukocyte Esterase, Urine NEGATIVE NEGATIVE    Urinalysis Comments       Microscopic exam not performed based on chemical results.   Creatinine, urine, random    Collection Time: 05/30/24  9:05 PM   Result Value Ref Range    Creatinine, Ur 71.3 39 - 259 MG/DL   Sodium, urine, random    Collection Time: 05/30/24  9:05 PM   Result Value Ref Range    Sodium, Ur 37 35 - 167 MMOL/L   POCT Glucose    Collection Time: 05/30/24 11:31 PM   Result Value Ref Range    POC Glucose 386 (H) 70 - 99 MG/DL   Basic Metabolic Panel w/ Reflex to MG    Collection Time: 05/31/24  3:29 AM   Result Value Ref Range    Sodium 141 135 - 145 MMOL/L

## 2025-05-15 NOTE — PROGRESS NOTES
Entered pt room to continue with VAD education and as soon as entering room, Mrs. Hayden began to yell that she does not want to learn any alarms today.  She continue to go on about Dr. Downing yelling at them about eating and continued to yell and vent.  Mr. Hayden looked over at her and yelled back telling her he is so sick and tired of her yelling all the time and rambling on about things that are not important and she makes him very nervous with all of the yelling and he is tired of it.  He told her right is right and wrong is wrong.  Armani Flowers stayed at bedside for another 30 minutes providing emotional support.  All questions answered to their satisfaction as evidence by verbal acknowledgement.  Will need to continue VAD education Monday.    You were seen in ED for evaluation of eye redness. Use eye drops as directed. You can use tylenol or cool compress over eye for discomfort. Monitor symptoms closely. Seek medical attention if you develop fevers, swelling, vision changes, or symptoms worsen.

## 2025-06-11 NOTE — ASSESSMENT & PLAN NOTE
- Appreciate ID recs. May need GLENN. Re culture today.   - recent ICD revision 11/20 with Jacque-Jennifer c/b pocket hematoma   - blood cultures obtained 1/6 and 4/4 bottles with staph species  - Will re culture today.   - DLES viewed and no signs of drainage or erythema  - Continue vancomycin; trough before 4th dose  - F/u on repeat blood cultures   Home

## (undated) DEVICE — VALVE ULTRASITE MALE LUER

## (undated) DEVICE — STAPLER SKIN ROTATING HEAD

## (undated) DEVICE — DRAPE SLUSH WARMER WITH DISC

## (undated) DEVICE — DRESSING TRANS 8X12 TEGADERM

## (undated) DEVICE — DRESSING AQUACEL SACRAL 9 X 9

## (undated) DEVICE — CONNECTOR 1/4X3/16X3/16 Y

## (undated) DEVICE — CONTAINER SPECIMEN STRL 4OZ

## (undated) DEVICE — SEE MEDLINE ITEM 152622

## (undated) DEVICE — TOWEL OR XRAY WHITE 17X26IN

## (undated) DEVICE — CATH THORACIC 32FR ST

## (undated) DEVICE — DRESSING TELFA STRL 4X3 LF

## (undated) DEVICE — LOOP VESSEL BLUE MAXI

## (undated) DEVICE — SUT VICRYL BR 1 GEN 27 CT-1

## (undated) DEVICE — PAD K-THERMIA 24IN X 60IN

## (undated) DEVICE — SUT PROLENE 4-0 SH BLU 36IN

## (undated) DEVICE — PACK SET UP CONVERTORS

## (undated) DEVICE — KIT SAHARA DRAPE DRAW/LIFT

## (undated) DEVICE — DRAPE INCISE IOBAN 2 23X17IN

## (undated) DEVICE — SUT SILK BLK BR. 2 2-60

## (undated) DEVICE — BLADE SAW STERNAL 5/BX

## (undated) DEVICE — DRAPE ABDOMINAL TIBURON 14X11

## (undated) DEVICE — Device

## (undated) DEVICE — SUT 0 30IN ETHIBOND EXCEL G

## (undated) DEVICE — SYR 50CC LL

## (undated) DEVICE — TRAY MINOR GEN SURG

## (undated) DEVICE — ELECTRODE REM PLYHSV RETURN 9

## (undated) DEVICE — CLOSURE SKIN STERI STRIP 1/2X4

## (undated) DEVICE — FOGERTY SOFT JAW DISP 2/PK

## (undated) DEVICE — SUT PROLENE 5-0 BL C-1 4-24

## (undated) DEVICE — DRESSING ADH ISLAND 3.6 X 14

## (undated) DEVICE — SUT SILK 2-0 SH 18IN BLACK

## (undated) DEVICE — SYR 30CC LUER LOCK

## (undated) DEVICE — SUT ETHILON 2LR DA 30IN BLK

## (undated) DEVICE — SUT ETHIBOND XTRA 1 CTX

## (undated) DEVICE — SEALANT EVICEL FIBRIN HUMN 2ML

## (undated) DEVICE — ELECTRODE PAD DEFIB STERILE

## (undated) DEVICE — GOWN SURGICAL X-LARGE

## (undated) DEVICE — DRESSING ABSRBNT ISLAND 3.6X8

## (undated) DEVICE — SPONGE GAUZE 16PLY 4X4

## (undated) DEVICE — DRAIN CHANNEL ROUND 19FR

## (undated) DEVICE — SEE MEDLINE ITEM 146313

## (undated) DEVICE — DRAPE STERI INSTRUMENT 1018

## (undated) DEVICE — NDL HYPO A BEVEL 22X1 1/2

## (undated) DEVICE — SEE MEDLINE ITEM 146417

## (undated) DEVICE — DRAIN CHEST DRY SUCTION

## (undated) DEVICE — SUT 3-0 CTD VICRYL 27IN PS

## (undated) DEVICE — PACKING STRIP IDOFORM 1X5YD

## (undated) DEVICE — BLADE 4 INCH EDGE UN-INS

## (undated) DEVICE — STAPLER SKIN PROXIMATE WIDE

## (undated) DEVICE — SUT PROLENE 4-0 RB-1 BL MO

## (undated) DEVICE — SUT 2/0 30IN ETHIBOND

## (undated) DEVICE — PACK DRAPE UNIVERSAL CONVERTOR

## (undated) DEVICE — TRAY HEART

## (undated) DEVICE — SUT BONE WAX 2.5 GRMS 12/BX

## (undated) DEVICE — PROBE CATH TEMP 16 FRFOLEY 400

## (undated) DEVICE — ELECTRODE EXTENDED BLADE

## (undated) DEVICE — APPLICATOR CHLORAPREP ORN 26ML

## (undated) DEVICE — SEE MEDLINE ITEM 146347

## (undated) DEVICE — CATH THOR STND RGHT ANG 32FR

## (undated) DEVICE — DRESSING TRANS 2X2 TEGADERM

## (undated) DEVICE — SUT 2/0 36IN COATED VICRYL

## (undated) DEVICE — SWABSTICK BENZOIN 4 IN

## (undated) DEVICE — INSERTS STEALTH FIBRA SIZE 5

## (undated) DEVICE — WIRE INTRAMYOCARDIAL TEMP

## (undated) DEVICE — GAUZE SPONGE 4X4 12PLY

## (undated) DEVICE — SUT 2/0 36IN ETHIBOND EXCE

## (undated) DEVICE — NDL BOX COUNTER

## (undated) DEVICE — SET DECANTER MEDICHOICE

## (undated) DEVICE — RETRACTOR OCTOBASE INSERT HOLD

## (undated) DEVICE — SEE MEDLINE ITEM 156902

## (undated) DEVICE — SUT 6 18IN STEEL MONO CCS

## (undated) DEVICE — SOL 9P NACL IRR PIC IL

## (undated) DEVICE — SPONGE LAP 18X18 PREWASHED

## (undated) DEVICE — BOOT SUTURE AID

## (undated) DEVICE — SEALANT COSEAL 8ML.

## (undated) DEVICE — SUT 1 48IN PDS II VIO MONO

## (undated) DEVICE — SUT ETHILON 2-0 PSLX 30IN

## (undated) DEVICE — SUT PROLENE 5-0 36IN C-1

## (undated) DEVICE — SOL NS 1000CC

## (undated) DEVICE — COVER SET UP STRL 54X54 20/BX

## (undated) DEVICE — HOLDER TUBE

## (undated) DEVICE — TRAY CATH UM FOLEY SIL W 16FR